# Patient Record
Sex: FEMALE | Race: BLACK OR AFRICAN AMERICAN | NOT HISPANIC OR LATINO | Employment: OTHER | URBAN - METROPOLITAN AREA
[De-identification: names, ages, dates, MRNs, and addresses within clinical notes are randomized per-mention and may not be internally consistent; named-entity substitution may affect disease eponyms.]

---

## 2017-01-09 ENCOUNTER — ALLSCRIPTS OFFICE VISIT (OUTPATIENT)
Dept: OTHER | Facility: OTHER | Age: 52
End: 2017-01-09

## 2017-01-10 ENCOUNTER — ALLSCRIPTS OFFICE VISIT (OUTPATIENT)
Dept: OTHER | Facility: OTHER | Age: 52
End: 2017-01-10

## 2017-01-10 ENCOUNTER — GENERIC CONVERSION - ENCOUNTER (OUTPATIENT)
Dept: OTHER | Facility: OTHER | Age: 52
End: 2017-01-10

## 2017-01-18 ENCOUNTER — ALLSCRIPTS OFFICE VISIT (OUTPATIENT)
Dept: RADIOLOGY | Facility: CLINIC | Age: 52
End: 2017-01-18
Payer: COMMERCIAL

## 2017-01-19 ENCOUNTER — GENERIC CONVERSION - ENCOUNTER (OUTPATIENT)
Dept: OTHER | Facility: OTHER | Age: 52
End: 2017-01-19

## 2017-02-02 ENCOUNTER — GENERIC CONVERSION - ENCOUNTER (OUTPATIENT)
Dept: OTHER | Facility: OTHER | Age: 52
End: 2017-02-02

## 2017-02-08 ENCOUNTER — GENERIC CONVERSION - ENCOUNTER (OUTPATIENT)
Dept: OTHER | Facility: OTHER | Age: 52
End: 2017-02-08

## 2017-02-23 ENCOUNTER — ALLSCRIPTS OFFICE VISIT (OUTPATIENT)
Dept: OTHER | Facility: OTHER | Age: 52
End: 2017-02-23

## 2017-02-28 ENCOUNTER — ALLSCRIPTS OFFICE VISIT (OUTPATIENT)
Dept: OTHER | Facility: OTHER | Age: 52
End: 2017-02-28

## 2017-02-28 DIAGNOSIS — M54.9 DORSALGIA: ICD-10-CM

## 2017-02-28 DIAGNOSIS — F11.20 UNCOMPLICATED OPIOID DEPENDENCE (HCC): ICD-10-CM

## 2017-02-28 DIAGNOSIS — Z79.899 OTHER LONG TERM (CURRENT) DRUG THERAPY: ICD-10-CM

## 2017-03-01 ENCOUNTER — ALLSCRIPTS OFFICE VISIT (OUTPATIENT)
Dept: RADIOLOGY | Facility: CLINIC | Age: 52
End: 2017-03-01
Payer: COMMERCIAL

## 2017-03-08 ENCOUNTER — ALLSCRIPTS OFFICE VISIT (OUTPATIENT)
Dept: OTHER | Facility: OTHER | Age: 52
End: 2017-03-08

## 2017-03-10 ENCOUNTER — GENERIC CONVERSION - ENCOUNTER (OUTPATIENT)
Dept: OTHER | Facility: OTHER | Age: 52
End: 2017-03-10

## 2017-03-13 ENCOUNTER — GENERIC CONVERSION - ENCOUNTER (OUTPATIENT)
Dept: OTHER | Facility: OTHER | Age: 52
End: 2017-03-13

## 2017-03-16 ENCOUNTER — GENERIC CONVERSION - ENCOUNTER (OUTPATIENT)
Dept: OTHER | Facility: OTHER | Age: 52
End: 2017-03-16

## 2017-03-22 ENCOUNTER — ALLSCRIPTS OFFICE VISIT (OUTPATIENT)
Dept: OTHER | Facility: OTHER | Age: 52
End: 2017-03-22

## 2017-03-29 ENCOUNTER — GENERIC CONVERSION - ENCOUNTER (OUTPATIENT)
Dept: OTHER | Facility: OTHER | Age: 52
End: 2017-03-29

## 2017-04-17 ENCOUNTER — GENERIC CONVERSION - ENCOUNTER (OUTPATIENT)
Dept: OTHER | Facility: OTHER | Age: 52
End: 2017-04-17

## 2017-05-10 ENCOUNTER — ALLSCRIPTS OFFICE VISIT (OUTPATIENT)
Dept: OTHER | Facility: OTHER | Age: 52
End: 2017-05-10

## 2017-05-10 DIAGNOSIS — E55.9 VITAMIN D DEFICIENCY: ICD-10-CM

## 2017-05-10 DIAGNOSIS — G89.4 CHRONIC PAIN SYNDROME: ICD-10-CM

## 2017-05-10 DIAGNOSIS — Z79.899 OTHER LONG TERM (CURRENT) DRUG THERAPY: ICD-10-CM

## 2017-05-10 DIAGNOSIS — F11.20 UNCOMPLICATED OPIOID DEPENDENCE (HCC): ICD-10-CM

## 2017-05-16 ENCOUNTER — ALLSCRIPTS OFFICE VISIT (OUTPATIENT)
Dept: OTHER | Facility: OTHER | Age: 52
End: 2017-05-16

## 2017-05-18 ENCOUNTER — GENERIC CONVERSION - ENCOUNTER (OUTPATIENT)
Dept: OTHER | Facility: OTHER | Age: 52
End: 2017-05-18

## 2017-05-18 ENCOUNTER — ALLSCRIPTS OFFICE VISIT (OUTPATIENT)
Dept: OTHER | Facility: OTHER | Age: 52
End: 2017-05-18

## 2017-05-19 ENCOUNTER — GENERIC CONVERSION - ENCOUNTER (OUTPATIENT)
Dept: OTHER | Facility: OTHER | Age: 52
End: 2017-05-19

## 2017-05-22 ENCOUNTER — GENERIC CONVERSION - ENCOUNTER (OUTPATIENT)
Dept: OTHER | Facility: OTHER | Age: 52
End: 2017-05-22

## 2017-05-25 ENCOUNTER — HOSPITAL ENCOUNTER (EMERGENCY)
Facility: HOSPITAL | Age: 52
Discharge: HOME | End: 2017-05-25
Attending: EMERGENCY MEDICINE | Admitting: EMERGENCY MEDICINE
Payer: COMMERCIAL

## 2017-05-25 VITALS
TEMPERATURE: 99.6 F | DIASTOLIC BLOOD PRESSURE: 84 MMHG | HEART RATE: 105 BPM | SYSTOLIC BLOOD PRESSURE: 148 MMHG | BODY MASS INDEX: 24.56 KG/M2 | OXYGEN SATURATION: 100 % | WEIGHT: 130 LBS | RESPIRATION RATE: 16 BRPM

## 2017-05-25 DIAGNOSIS — H72.92 PERFORATED LEFT TYMPANIC MEMBRANE ON EXAMINATION: Primary | ICD-10-CM

## 2017-05-25 LAB
BACTERIA UR QL AUTO: ABNORMAL /HPF
BILIRUB UR QL STRIP: NEGATIVE
CLARITY UR: CLEAR
COLOR UR: YELLOW
GLUCOSE UR STRIP-MCNC: NEGATIVE MG/DL
HGB UR QL STRIP.AUTO: ABNORMAL
KETONES UR STRIP-MCNC: NEGATIVE MG/DL
LEUKOCYTE ESTERASE UR QL STRIP: ABNORMAL
NITRITE UR QL STRIP: NEGATIVE
NON-SQ EPI CELLS URNS QL MICRO: ABNORMAL /HPF
PH UR STRIP.AUTO: 7.5 [PH] (ref 4.5–8)
PROT UR STRIP-MCNC: NEGATIVE MG/DL
RBC #/AREA URNS AUTO: ABNORMAL /HPF
SP GR UR STRIP.AUTO: 1.01 (ref 1–1.03)
UROBILINOGEN UR QL STRIP.AUTO: 0.2 E.U./DL
WBC #/AREA URNS AUTO: ABNORMAL /HPF

## 2017-05-25 PROCEDURE — 99283 EMERGENCY DEPT VISIT LOW MDM: CPT

## 2017-05-25 PROCEDURE — 81001 URINALYSIS AUTO W/SCOPE: CPT

## 2017-05-25 PROCEDURE — 96372 THER/PROPH/DIAG INJ SC/IM: CPT

## 2017-05-25 RX ORDER — KETOROLAC TROMETHAMINE 30 MG/ML
15 INJECTION, SOLUTION INTRAMUSCULAR; INTRAVENOUS ONCE
Status: COMPLETED | OUTPATIENT
Start: 2017-05-25 | End: 2017-05-25

## 2017-05-25 RX ORDER — AMOXICILLIN AND CLAVULANATE POTASSIUM 875; 125 MG/1; MG/1
1 TABLET, FILM COATED ORAL 2 TIMES DAILY
Qty: 14 TABLET | Refills: 0 | Status: SHIPPED | OUTPATIENT
Start: 2017-05-25 | End: 2017-06-04

## 2017-05-25 RX ORDER — OFLOXACIN 3 MG/ML
5 SOLUTION AURICULAR (OTIC) 2 TIMES DAILY
Qty: 5 ML | Refills: 0 | Status: SHIPPED | OUTPATIENT
Start: 2017-05-25 | End: 2017-06-01

## 2017-05-25 RX ADMIN — KETOROLAC TROMETHAMINE 15 MG: 30 INJECTION, SOLUTION INTRAMUSCULAR at 06:25

## 2017-06-07 ENCOUNTER — GENERIC CONVERSION - ENCOUNTER (OUTPATIENT)
Dept: OTHER | Facility: OTHER | Age: 52
End: 2017-06-07

## 2017-06-07 ENCOUNTER — ALLSCRIPTS OFFICE VISIT (OUTPATIENT)
Dept: OTHER | Facility: OTHER | Age: 52
End: 2017-06-07

## 2017-06-09 ENCOUNTER — GENERIC CONVERSION - ENCOUNTER (OUTPATIENT)
Dept: OTHER | Facility: OTHER | Age: 52
End: 2017-06-09

## 2017-06-12 ENCOUNTER — HOSPITAL ENCOUNTER (EMERGENCY)
Facility: HOSPITAL | Age: 52
Discharge: HOME | End: 2017-06-12
Attending: EMERGENCY MEDICINE
Payer: COMMERCIAL

## 2017-06-12 VITALS
SYSTOLIC BLOOD PRESSURE: 195 MMHG | BODY MASS INDEX: 26.26 KG/M2 | OXYGEN SATURATION: 99 % | WEIGHT: 139 LBS | DIASTOLIC BLOOD PRESSURE: 117 MMHG | RESPIRATION RATE: 20 BRPM | TEMPERATURE: 98.8 F | HEART RATE: 85 BPM

## 2017-06-12 DIAGNOSIS — M25.551 CHRONIC PAIN OF RIGHT HIP: Primary | ICD-10-CM

## 2017-06-12 DIAGNOSIS — F31.9 BIPOLAR DISORDER (HCC): ICD-10-CM

## 2017-06-12 DIAGNOSIS — G89.29 CHRONIC PAIN OF RIGHT HIP: Primary | ICD-10-CM

## 2017-06-12 PROCEDURE — 99283 EMERGENCY DEPT VISIT LOW MDM: CPT

## 2017-06-12 PROCEDURE — 96372 THER/PROPH/DIAG INJ SC/IM: CPT

## 2017-06-12 RX ORDER — KETOROLAC TROMETHAMINE 30 MG/ML
15 INJECTION, SOLUTION INTRAMUSCULAR; INTRAVENOUS ONCE
Status: COMPLETED | OUTPATIENT
Start: 2017-06-12 | End: 2017-06-12

## 2017-06-12 RX ADMIN — KETOROLAC TROMETHAMINE 15 MG: 30 INJECTION, SOLUTION INTRAMUSCULAR at 20:29

## 2017-06-14 ENCOUNTER — GENERIC CONVERSION - ENCOUNTER (OUTPATIENT)
Dept: OTHER | Facility: OTHER | Age: 52
End: 2017-06-14

## 2017-06-16 ENCOUNTER — HOSPITAL ENCOUNTER (OUTPATIENT)
Dept: RADIOLOGY | Facility: HOSPITAL | Age: 52
Discharge: HOME | End: 2017-06-16
Attending: ORTHOPAEDIC SURGERY
Payer: COMMERCIAL

## 2017-06-16 ENCOUNTER — ALLSCRIPTS OFFICE VISIT (OUTPATIENT)
Dept: OTHER | Facility: OTHER | Age: 52
End: 2017-06-16

## 2017-06-16 DIAGNOSIS — M17.10 PRIMARY OSTEOARTHRITIS OF ONE KNEE: ICD-10-CM

## 2017-06-16 PROCEDURE — 73562 X-RAY EXAM OF KNEE 3: CPT

## 2017-06-19 ENCOUNTER — ALLSCRIPTS OFFICE VISIT (OUTPATIENT)
Dept: OTHER | Facility: OTHER | Age: 52
End: 2017-06-19

## 2017-07-03 ENCOUNTER — TRANSCRIBE ORDERS (OUTPATIENT)
Dept: ADMINISTRATIVE | Facility: HOSPITAL | Age: 52
End: 2017-07-03

## 2017-07-03 ENCOUNTER — GENERIC CONVERSION - ENCOUNTER (OUTPATIENT)
Dept: OTHER | Facility: OTHER | Age: 52
End: 2017-07-03

## 2017-07-03 DIAGNOSIS — M16.0 PRIMARY OSTEOARTHRITIS OF BOTH HIPS: Primary | ICD-10-CM

## 2017-07-11 ENCOUNTER — HOSPITAL ENCOUNTER (OUTPATIENT)
Dept: MRI IMAGING | Facility: HOSPITAL | Age: 52
Discharge: HOME | End: 2017-07-11
Attending: ANESTHESIOLOGY
Payer: COMMERCIAL

## 2017-07-11 ENCOUNTER — GENERIC CONVERSION - ENCOUNTER (OUTPATIENT)
Dept: OTHER | Facility: OTHER | Age: 52
End: 2017-07-11

## 2017-07-11 DIAGNOSIS — M16.0 PRIMARY OSTEOARTHRITIS OF BOTH HIPS: ICD-10-CM

## 2017-07-11 PROCEDURE — 73721 MRI JNT OF LWR EXTRE W/O DYE: CPT

## 2017-07-12 ENCOUNTER — GENERIC CONVERSION - ENCOUNTER (OUTPATIENT)
Dept: OTHER | Facility: OTHER | Age: 52
End: 2017-07-12

## 2017-07-17 ENCOUNTER — GENERIC CONVERSION - ENCOUNTER (OUTPATIENT)
Dept: OTHER | Facility: OTHER | Age: 52
End: 2017-07-17

## 2017-07-18 ENCOUNTER — APPOINTMENT (OUTPATIENT)
Dept: LAB | Facility: HOSPITAL | Age: 52
End: 2017-07-18
Payer: COMMERCIAL

## 2017-07-18 DIAGNOSIS — E55.9 VITAMIN D DEFICIENCY: ICD-10-CM

## 2017-07-18 LAB
25(OH)D3 SERPL-MCNC: 30.5 NG/ML (ref 30–100)
ALBUMIN SERPL BCP-MCNC: 3.6 G/DL (ref 3.5–5)
ALP SERPL-CCNC: 86 U/L (ref 46–116)
ALT SERPL W P-5'-P-CCNC: 19 U/L (ref 12–78)
ANION GAP SERPL CALCULATED.3IONS-SCNC: 7 MMOL/L (ref 4–13)
AST SERPL W P-5'-P-CCNC: 13 U/L (ref 5–45)
BILIRUB SERPL-MCNC: 0.57 MG/DL (ref 0.2–1)
BUN SERPL-MCNC: 6 MG/DL (ref 5–25)
CALCIUM SERPL-MCNC: 8.5 MG/DL (ref 8.3–10.1)
CHLORIDE SERPL-SCNC: 101 MMOL/L (ref 100–108)
CO2 SERPL-SCNC: 31 MMOL/L (ref 21–32)
CREAT SERPL-MCNC: 0.88 MG/DL (ref 0.6–1.3)
GFR SERPL CREATININE-BSD FRML MDRD: >60 ML/MIN/1.73SQ M
GLUCOSE SERPL-MCNC: 94 MG/DL (ref 65–140)
POTASSIUM SERPL-SCNC: 3.2 MMOL/L (ref 3.5–5.3)
PROT SERPL-MCNC: 7 G/DL (ref 6.4–8.2)
SODIUM SERPL-SCNC: 139 MMOL/L (ref 136–145)

## 2017-07-18 PROCEDURE — 82306 VITAMIN D 25 HYDROXY: CPT

## 2017-07-18 PROCEDURE — 36415 COLL VENOUS BLD VENIPUNCTURE: CPT

## 2017-07-18 PROCEDURE — 80053 COMPREHEN METABOLIC PANEL: CPT

## 2017-08-03 ENCOUNTER — ALLSCRIPTS OFFICE VISIT (OUTPATIENT)
Dept: OTHER | Facility: OTHER | Age: 52
End: 2017-08-03

## 2017-08-09 ENCOUNTER — GENERIC CONVERSION - ENCOUNTER (OUTPATIENT)
Dept: OTHER | Facility: OTHER | Age: 52
End: 2017-08-09

## 2017-08-09 ENCOUNTER — ALLSCRIPTS OFFICE VISIT (OUTPATIENT)
Dept: OTHER | Facility: OTHER | Age: 52
End: 2017-08-09

## 2017-09-19 ENCOUNTER — GENERIC CONVERSION - ENCOUNTER (OUTPATIENT)
Dept: OTHER | Facility: OTHER | Age: 52
End: 2017-09-19

## 2017-10-03 ENCOUNTER — GENERIC CONVERSION - ENCOUNTER (OUTPATIENT)
Dept: OTHER | Facility: OTHER | Age: 52
End: 2017-10-03

## 2017-10-06 ENCOUNTER — GENERIC CONVERSION - ENCOUNTER (OUTPATIENT)
Dept: OTHER | Facility: OTHER | Age: 52
End: 2017-10-06

## 2017-10-13 ENCOUNTER — GENERIC CONVERSION - ENCOUNTER (OUTPATIENT)
Dept: OTHER | Facility: OTHER | Age: 52
End: 2017-10-13

## 2017-10-13 DIAGNOSIS — Z79.899 OTHER LONG TERM (CURRENT) DRUG THERAPY: ICD-10-CM

## 2017-10-13 DIAGNOSIS — G89.4 CHRONIC PAIN SYNDROME: ICD-10-CM

## 2017-10-13 DIAGNOSIS — F11.20 UNCOMPLICATED OPIOID DEPENDENCE (HCC): ICD-10-CM

## 2017-10-17 ENCOUNTER — GENERIC CONVERSION - ENCOUNTER (OUTPATIENT)
Dept: OTHER | Facility: OTHER | Age: 52
End: 2017-10-17

## 2017-11-07 ENCOUNTER — ALLSCRIPTS OFFICE VISIT (OUTPATIENT)
Dept: OTHER | Facility: OTHER | Age: 52
End: 2017-11-07

## 2017-12-08 ENCOUNTER — ALLSCRIPTS OFFICE VISIT (OUTPATIENT)
Dept: OTHER | Facility: OTHER | Age: 52
End: 2017-12-08

## 2017-12-08 DIAGNOSIS — M54.16 RADICULOPATHY OF LUMBAR REGION: ICD-10-CM

## 2017-12-09 NOTE — PROGRESS NOTES
Assessment    1  Lumbar radiculopathy (724 4) (M54 16)   2  Lumbar spondylosis (721 3) (M47 816)   3  Fibromyalgia (729 1) (M79 7)   4  Chronic pain syndrome (338 4) (G89 4)    Plan  Chronic bilateral low back pain with bilateral sciatica    · Gabapentin 300 MG Oral Capsule; Take one capsule at bedtime with one 600mg tablet to equal a total of 900mg at bedtime for leg pain   Rx By: Pérez Spain; Dispense: 30 Days ; #:30 X 90 Capsule Bottle; Refill: 2;Chronic bilateral low back pain with bilateral sciatica; MARGARITO = N; Verified Transmission to [facility] #[id number]; Last Updated By: System, SureScripts; 12/8/2017 2:28:34 PM  Chronic pain syndrome    · Morphine Sulfate ER 15 MG Oral Tablet Extended Release; take 1 po in am; DoNot Fill Before: [id number]   Rx By: Pérez Spain; Dispense: 30 Days ; #:30 Tablet Extended Release; Refill: 0;For: Chronic pain syndrome; MARGARITO = N; Print Rx  Chronic pain syndrome, Fibromyalgia    · TiZANidine HCl - 2 MG Oral Tablet; Take 1 po tid prn spasms   Rx By: Pérez Spain; Dispense: 30 Days ; #:90 Tablet; Refill: 2;Chronic pain syndrome, Fibromyalgia; MARGARITO = N; Verified Transmission to [facility] #[id number]; Last Updated By: System, SureScripts; 12/8/2017 2:28:40 PM  Lumbar radiculopathy    · * MRI LUMBAR SPINE WO CONTRAST; Status:Need Information - FinancialAuthorization; Requested for:[id number];    Perform:[facility]; [phone]; Ordered; For:Lumbar radiculopathy; Ordered By:Vikash Kendall;  Lumbar spondylosis    · Gabapentin 600 MG Oral Tablet; Take 1 po hs   Rx By: Pérez Spain; Dispense: 30 Days ; #:30 Tablet; Refill: 2;Lumbar spondylosis; MARGARITO = N; Verified Transmission to [facility] #[id number];  Last Updated By: System, SureScripts; 12/8/2017 2:28:35 PM  Osteoarthritis of both hips, unspecified osteoarthritis type    · Acetaminophen-Codeine #3 300-30 MG Oral Tablet; TAKE 1 TABLET TWICEDAILY AS NEEDED FOR PAIN; Do Not Fill Before: [id number]   Rx By: Pérez Spain; Dispense: 30 Days ; #:60 Tablet; Refill: 1;Osteoarthritis of both hips, unspecified osteoarthritis type; MARGARITO = N; Print Rx    49-year-old female with a history of fibromyalgia returning for follow-up of lumbosacral back pain with radiculitis in the L4-5 distributions of bilateral lower extremities  The patient does have positive straight leg raise bilaterally  Her low back pain seems to be multifactorial with contributing factors including myofascial pain, a facet mediated component, and possible neuropathic component  The patient manages her pain with morphine ER 15 mg daily, Tylenol No  3 b i d  p r n  for breakthrough pain, gabapentin 900 mg q h s , and tizanidine 2 mg q 8 hours p r n  Sherre Soulier She was taking meloxicam, however did not find this very effective  She had also previously tried numerous NSAIDs  The seem to be ineffective  Also of note, the patient does take lorazepam for anxiety and I did talk with her once again about speaking with her psychiatrist to find a non benzodiazepine anxiolytic  1  I will order an MRI of the patient's lumbar spine 2  we will continue morphine ER 15 mg daily  The patient states that this medication is effective at helping to manage her pain and complete her activities of daily living  She is not exhibiting any Aspirin behavior at this time  prescription drug monitoring program report was reviewed and appropriate for what is prescribed  The risks and side effects of chronic opioid treatment were discussed in detail with the patient  Side effects include, but are not limited to: nausea, vomiting, GI intolerance, sedation, constipation, mental clouding, opioid induced hyperalgesia, endocrine dysfunction, addiction, dependence, and tolerance  The patient was asked to take their medications only as prescribed and directed, never in excess, and never for any other reason other than for pain control   The patient was also asked to keep his medications out of the reach of others and away from children, preferably in a locked drawer  The patient verbalized understanding and wished to use these opioid medications  the patient was given prescriptions with a do not fill day before December 14, 2017 and 1 for January 13 2018    3  We will continue Tylenol No  3 b i d  p r n  for breakthrough pain 4  Will continue tizanidine 2 mg q 8 hours p r n  for myofascial pain 5  Will continue gabapentin 900 mg q h s  and we will hold off on increasing the secondary to daytime drowsiness with daytime dosing 6  I will follow up the patient in 2 months 7  Patient will continue with her home exercise program   Discussion/Summary  The patient has the current Goals: Reduced pain and improved function  The patent has the current Barriers: Fibromyalgia  Patient is able to Self-Care  Possible side effects of new medications were reviewed with the patient/guardian today  The treatment plan was reviewed with the patient/guardian  The patient/guardian understands and agrees with the treatment plan   The patient was counseled regarding diagnostic results,-- instructions for management,-- risk factor reductions,-- prognosis,-- patient and family education,-- impressions,-- risks and benefits of treatment options-- and-- importance of compliance with treatment  total time of encounter was 15 minutes  Self Referrals: No      Chief Complaint    1  Back Pain  Low back and leg pain      History of Present Illness  68-year-old female with a history of fibromyalgia returning for follow-up of lumbosacral back pain that radiates into bilateral lower extremities in the L4-5 distribution down to mid shin  She denies any numbness, paresthesias, or subjective weakness  She denies any bladder or bowel incontinence or saddle anesthesia  X-ray of her lumbar spine reveals degenerative disc disease, scoliosis, and spondylosis  She has had lumbar facet joint injections without much relief   She has also had SI joint injections and a right hip injection which did not provide any significant her sustainable relief  She is currently utilizing morphine ER 15 mg daily and Tylenol No  3 b i d  p r n  for breakthrough pain  She does take gabapentin 900 mg q h s  and tizanidine 2 mg q 8 hours p r n  with about 30% of pain relief  she denies any side effects from the medications other than some drowsiness with daytime doses of gabapentin, which is what she takes the medication at bedtime only  patient rates her pain a 9/10 on the pain is worse in the morning in the evening  The pain is constant and described as burning, throbbing, cramping, and shooting  The pain is worse with standing and walking and lifting  The pain is alleviated with relaxation and lying down  I have personally reviewed and/or updated the patient's past medical history, past surgical history, family history, social history, allergies, and vital signs today  than as stated above, the patient denies any interval changes in medications, medical condition, mental condition, symptoms, or allergies since the last office visit  Fausto Washington presents with complaints of constant episodes of bilateral lower back pain, described as burning and throbbing, radiating to the bilateral thigh  On a scale of 1 to 10, the patient rates the pain as 9  Symptoms are worsening  Review of Systems   Constitutional: no fever,-- no recent weight gain-- and-- no recent weight loss  Eyes: no double vision-- and-- no blurry vision  Cardiovascular: no chest pain,-- no palpitations-- and-- no lower extremity edema  Respiratory: no complaints of shortness of breath-- and-- no wheezing  Musculoskeletal: difficulty walking,-- muscle weakness-- and-- joint stiffness, but-- no joint swelling,-- no limb swelling,-- no pain in extremity-- and-- no decreased range of motion  Neurological: memory loss, but-- no dizziness,-- no difficulty swallowing,-- no loss of consciousness-- and-- no seizures    Gastrointestinal: constipation, but-- no nausea,-- no vomiting-- and-- no diarrhea  Genitourinary: no difficulty initiating urine stream,-- no genital pain-- and-- no frequent urination  Integumentary: no complaints of skin rash  Psychiatric: no depression  Endocrine: no excessive thirst,-- no adrenal disease,-- no hypothyroidism-- and-- no hyperthyroidism  Hematologic/Lymphatic: no tendency for easy bruising-- and-- no tendency for easy bleeding  ROS reviewed  Active Problems    1  Analgesic use (V58 69) (Z79 899)   2  Anxiety (300 00) (F41 9)   3  Arthralgia Of Shoulder Region (719 41)   4  Back pain (724 5) (M54 9)   5  Bereavement (V62 82) (Z63 4)   6  Bilateral hip pain (719 45) (M25 551,M25 552)   7  Bipolar II disorder (296 89) (F31 81)   8  Chronic bilateral low back pain with bilateral sciatica (724 2,724 3,338 29) (M54 42,M54 41,G89 29)   9  Chronic pain of both knees (471 44,221 98) (M25 561,M25 562,G89 29)   10  Chronic pain syndrome (338 4) (G89 4)   11  Cognitive disorder (294 9) (F09)   12  Esophageal reflux (530 81) (K21 9)   13  Fatigue (780 79) (R53 83)   14  Female pelvic pain (625 9) (R10 2)   15  Fibromyalgia (729 1) (M79 7)   16  Generalized anxiety disorder (300 02) (F41 1)   17  Hypertension (401 9) (I10)   18  Hypokalemia (276 8) (E87 6)   19  Insomnia (780 52) (G47 00)   20  Lumbar spondylosis (721 3) (M47 816)   21  Major depressive disorder, recurrent episode, moderate degree (296 32) (F33 1)   22  Migraine (346 90) (G43 909)   23  History of Need for prophylactic vaccination and inoculation against influenza (V04 81)  (Z23)   24  Opioid dependence (304 00) (F11 20)   25  Osteoarthritis of both hips, unspecified osteoarthritis type (715 95) (M16 0)   26  Osteoarthritis of knee (715 36) (M17 10)   27  Overactive bladder (596 51) (N32 81)   28  Pain, joint, hip (719 45) (M25 559)   29  Panic disorder without agoraphobia (300 01) (F41 0)   30  Post traumatic stress disorder (309 81) (F43 10)   31   Primary localized osteoarthritis of both knees (715 16) (M17 0)   32  Recent unexplained weight loss (783 21) (R63 4)   33  Right knee pain (719 46) (M25 561)   34  Sacroiliitis (720 2) (M46 1)   35  Tremor (781 0) (R25 1)   36  Urinary incontinence (788 30) (R32)   37  Vitamin D deficiency (268 9) (E55 9)    Past Medical History    1  History of Acute nonsuppurative otitis media, unspecified laterality (381 00) (H65 199)   2  History of Bipolar disorder (296 80) (F31 9)   3  History of Encounter for screening colonoscopy (V76 51) (Z12 11)   4  Fibromyalgia (729 1) (M79 7)   5  History of H/O colonoscopy (V45 89) (Z98 890)   6  History of nausea and vomiting (V12 79) (Z87 898)   7  History of stroke (V12 54) (Z86 73)   8  History of urinary tract infection (V13 02) (Z87 440)   9  History of Left knee pain (719 46) (M25 562)   10  History of Memory loss (780 93) (R41 3)   11  History of Mixed Anxiety Disorder (300 00)   12  History of Need for hepatitis C screening test (V73 89) (Z11 59)   13  History of Need for prophylactic vaccination and inoculation against influenza (V04 81)  (Z23)   14  History of Need for Tdap vaccination (V06 1) (Z23)   15  Personal history of arthritis (V13 4) (Z87 39)   16  History of Previous Spontaneous Vaginal Delivery   17  History of Screening for lipoid disorders (V77 91) (Z13 220)   18  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)   19  History of Visit for screening mammogram (V76 12) (Z12 31)    Surgical History  1  History of  Section   2  History of Ear Surgery   3  History of Hysterectomy   4  History of Tubal Ligation    Family History  Mother    1  Family history of Colon Cancer (V16 0)  Father    2  Family history of Alzheimer's disease (V17 2) (Z82 0)   3  Family history of cerebrovascular accident (V17 1) (Z82 3)  Son    4  Family history of seizures (V19 8) (Z84 89)  Brother    5  Family history of Colon cancer   6   Family history of bipolar disorder (V17 0) (Z81 8)  Maternal Aunt    7  Family history of Breast Cancer (V16 3)  Unknown    8  Family history of cardiac disorder (V17 49) (Z82 49)   9  Family history of diabetes mellitus (V18 0) (Z83 3)   10  Family history of hypertension (V17 49) (Z82 49)    Social History     · Being A Social Drinker   · Bereavement (V62 82) (Z63 4)   · Daily caffeine consumption, 6-8 servings a day   ·    · Education Level: Graduate school   · Lives in [facility]   · Never A Smoker   · No drug use    Current Meds   1  Acetaminophen-Codeine #3 300-30 MG Oral Tablet; TAKE 1 TABLET TWICE DAILY AS NEEDED FOR PAIN; Therapy: 74QOA7607 to (Evaluate:44Swq4789); Last Rx:13Oct2017 Ordered   2  AmLODIPine Besylate 10 MG Oral Tablet; TAKE 1 TABLET DAILY; Therapy: 93OFZ2866 to (Evaluate:13May2018)  Requested for: 58IKI8247; Last Rx:18May2017 Ordered   3  Aspirin 81 MG Oral Tablet Delayed Release; take 1 tablet by mouth daily; Therapy: 13DUK3423 to (Evaluate:73Qwq0832)  Requested for: 01TQM4255; Last Rx:24Feb2017 Ordered   4  Gabapentin 300 MG Oral Capsule; Take one capsule at bedtime with one 600 mg tablet to equal a total of 900mg at bedtime for leg pain; Therapy: 92AVV9609 to (GEOXWPZM:43JVR2259)  Requested for: 27DAK5516; Last Rx:13Oct2017 Ordered   5  Gabapentin 600 MG Oral Tablet; Take 1 po hs; Therapy: 57TCB6524 to (Evaluate:11Jan2018)  Requested for: 13Oct2017; Last Rx:13Oct2017 Ordered   6  LORazepam 2 MG Oral Tablet; TAKE 1 TABLET THREE TIMES A DAY AS NEEDED *DO NOT FILL UNTIL 8/21 PERMD FOR TRAVEL*; Therapy: 97JNO6653 to (Evaluate:87Iyi0423)  Requested for: 77VAJ5450; Last Rx:07Nov2017 Ordered   7  Meloxicam 7 5 MG Oral Tablet; Take 1 tablet twice daily as needed; Therapy: 62TEO3740 to (Last Rx:06Oct2017)  Requested for: 42QRU7725 Ordered   8  Morphine Sulfate ER 15 MG Oral Tablet Extended Release; take 1 po in am; Therapy: 62KKE7517 to (Evaluate:12Nov2017); Last Rx:13Oct2017 Ordered   9   Omeprazole 20 MG Oral Capsule Delayed Release; TAKE 1 CAPSULE DAILY EVERY MORNING BEFORE BREAKFAST; Therapy: 44SXP5834 to (Evaluate:2018)  Requested for: 44STU3349; Last Rx:2017 Ordered   10  Primidone 250 MG Oral Tablet; TAKE 1 TABLET AT BEDTIME; Therapy: 86PTT8802 to (Sherri Ocampo)  Requested for: 36Tbs4906; Last  Rx:27Xiu3068 Ordered   11  Propranolol HCl - 20 MG Oral Tablet; Take 1 tablet twice daily; Therapy: 54PTS7411 to (Evaluate:2018)  Requested for: 15NLC8439; Last  Rx:2017 Ordered   12  RisperiDONE 2 MG Oral Tablet; TAKE 1 TABLET Bedtime; Therapy: 22NAJ5608 to ([facility])  Requested for: 24IMO4162; Last  Rx:2017 Ordered   13  TiZANidine HCl - 2 MG Oral Tablet; Take 1 po tid prn spasms; Therapy: 98YTH3206 to ([id number])  Requested for: 53VLV3423; Last  Rx:2017 Ordered   14  TraZODone HCl - 150 MG Oral Tablet; TAKE 1 TABLET Once At Bedtime; Therapy: 15GJU7557 to ([facility])  Requested for: 27XXT1973; Last  Rx:2017 Ordered   15  Venlafaxine HCl - 25 MG Oral Tablet; TAKE 1 TABLET 3 TIMES DAILY WITH FOOD; Therapy: 41AJF3673 to ([facility])  Requested for: 00KZL8535; Last  Rx:2017 Ordered   16  VESIcare 10 MG Oral Tablet; Take 1 tablet daily; Therapy: 93CQG0377 to ([facility])  Requested for: 99MGP7811; Last  Rx:2017 Ordered   17  Vitamin D3 1000 UNIT Oral Capsule; TAKE 1 CAPSULE DAILY; Therapy: 19DXR2012 to (Evaluate:2018)  Requested for: 72MNS7709; Last  Rx:2017 Ordered    Allergies  1  No Known Drug Allergies    Vitals  Vital Signs    Recorded: 48QSH2954 01:56PM   Temperature 98 1 F   Heart Rate 61   Systolic 603   Diastolic 89   Height 5 ft 1 in   Weight 136 lb    BMI Calculated 25 7   BSA Calculated 1 6   Pain Scale 9       Physical Exam   Constitutional  General appearance: Well developed, well nourished, alert, in no distress, non-toxic and no overt pain behavior  Eyes  Sclera: anicteric  HEENT  Hearing grossly intact     Neck Neck: Supple, symmetric, trachea midline, no masses  Pulmonary  Respiratory effort: Even and unlabored  Abdomen  Abdomen: Soft, non-tender, non-distended  Skin  Skin and subcutaneous tissue: Normal without rashes or lesions, well hydrated  Psychiatric  Mood and affect: Mood and affect appropriate  Neurologic  the muscle tone was normal  Musculoskeletal  Gait and station: Normal    Lumbar/Sacral Spine examination demonstrates  Bilateral lumbar paraspinals mildly tender to palpation with muscle spasms noted bilaterally  Bilateral lower extremity strength 5/5 in all muscle groups  Positive seated straight leg raise bilaterally  Results/Data  Results Free Text Form Pain [facility]:   Results    I personally reviewed the films/images in the office today        Future Appointments    Date/Time Provider Specialty Site   12/22/2017 11:20 AM Sonia Means DO Orthopedic Surgery  [facility]   01/19/2018 04:15 PM Baljeet Diamond MS, APRN, PMHCNS_BS  [facility]   02/02/2018 02:15 PM Collin Calzada DO Pain Management [facility]       Signatures   Electronically signed by : Jena Michel DO; Dec  8 2017  5:11PM EST                       (Author)

## 2017-12-11 ENCOUNTER — TRANSCRIBE ORDERS (OUTPATIENT)
Dept: ADMINISTRATIVE | Facility: HOSPITAL | Age: 52
End: 2017-12-11

## 2017-12-11 DIAGNOSIS — M54.16 LUMBAR RADICULOPATHY: Primary | ICD-10-CM

## 2017-12-12 ENCOUNTER — GENERIC CONVERSION - ENCOUNTER (OUTPATIENT)
Dept: OTHER | Facility: OTHER | Age: 52
End: 2017-12-12

## 2017-12-13 ENCOUNTER — GENERIC CONVERSION - ENCOUNTER (OUTPATIENT)
Dept: OTHER | Facility: OTHER | Age: 52
End: 2017-12-13

## 2017-12-19 ENCOUNTER — HOSPITAL ENCOUNTER (OUTPATIENT)
Dept: MRI IMAGING | Facility: HOSPITAL | Age: 52
Discharge: HOME | End: 2017-12-19
Attending: ANESTHESIOLOGY
Payer: COMMERCIAL

## 2017-12-19 DIAGNOSIS — M54.16 RADICULOPATHY OF LUMBAR REGION: ICD-10-CM

## 2017-12-19 PROCEDURE — 72148 MRI LUMBAR SPINE W/O DYE: CPT

## 2017-12-20 ENCOUNTER — GENERIC CONVERSION - ENCOUNTER (OUTPATIENT)
Dept: OTHER | Facility: OTHER | Age: 52
End: 2017-12-20

## 2018-01-10 NOTE — MISCELLANEOUS
Message   Recorded as Task   Date: 04/17/2017 10:46 AM, Created By: [facility]   Task Name: Medical Complaint Callback   Assigned To: [facility]   Regarding Patient: Ranjeet Schmidt, Status: Active   CommentIain Bazzi - 17 Apr 2017 10:46 AM     TASK CREATED  Caller: Self; Medical Complaint; [phone] (Home)  took call from phone room, S/W pt  Pt states she is in [place] and is running out of Tizanidine 2 mg, take 1 tablet 3x/day  Pt stated she can't get it filled in [place] b/c her insurance won't pay for it  Pt stated she has [address] with here in [facility] and she is wondering if she can take the Bacolfen 10 mg for now in place of the Tizanidine to she gets home? Pt stated she "will be short 7 pills while she is in [place] of the Tizanidine "  Pt states she will be home in 1 week  Pt states she is "in alot of pain, 9/10 and 10/10 when trying to walk  I can barely walk, using a wheelchair to get from point A to point B "  Pain is in back, b/l hips, b/l groins- left groin is worse  Pt is wondering about the "patch that stays on for a week that was talked about  I need to be seen to get the patch and b/c of the pain in my legs, hips and back when I get back home "    **Pt stated okay to leave message on machine if she doens't answer "   Jenaro Fonseca - 17 Apr 2017 10:51 AM     TASK REPLIED TO: Previously Assigned To [facility] [place] [facility]  She can take Baclofen in place of the Tizanidine and as far as the Butrans patch, there is nothing we can do until she is back in [place] and is seen in the office as we discussed at her last OV  Thank you  Carolina Reno - 17 Apr 2017 11:33 AM     TASK EDITED   [facility] - 17 Apr 2017 12:20 PM     TASK EDITED  s/w pt  Advised pt of the same  Pt verbalized understanding  Pt stated "I'm in alot of pain  I had a death in the family that is why I went to [facility]    I want to come in before 5/17/17 to get the patch "  Looked at [facility] - DEWAYNE BUSTILLO  and MICAH's schedules and did not find one sooner  "Pt stated I don't know what to do  I'm having a hard time  I can't even go up the stairs, using a wheelchair "  Recommended pt to go to the ER if in excruciating pain or if pain is above 10/10  Pt will call us when she gets home to see if any appts opened up and Ashwini Dennis will put pt on cancellation list if an appt opens up sooner  Please advise  Jenaro Fonseca - 17 Apr 2017 12:27 PM     TASK REPLIED TO: Previously Assigned To Jenaro Fonseca  That is the best we can do  She should take the medications as prescribed and we are sorry for her loss  Thank you  Estelle Russell - 17 Apr 2017 1:29 PM     TASK EDITED  S/w pt  Advised pt of the same  Pt verbalized understanding  Pt stated "I don't want to go to the ER down here  They only cover me in [place]" -referring to insurance  I don't know what to do  I can't walk "  Recommended ice and/or heat  Pt stated she tried a hot shower and she stated she will try her best to get some ice  Jenaro Fonseca - 17 Apr 2017 2:44 PM     TASK REPLIED TO: Previously Assigned To Jenaro Fonseca  Provider aware  There is nothing we can do from here  She definately has some cognitive issues so, she should go to the ER or f/u when she gets home  There is just nothing else we can do  Kirill Starks - 17 Apr 2017 2:59 PM     TASK EDITED  s/w pt, Adcknoledged previous conversation  Pt states she is resting with ice which seems to be helping at this time  Pt states she will have to go to the ed if the pain doesn't get better / gets worse  Advised pt - there is not much this office can do at this point  Advised pt, go to the ed if necessary - call the customer service number on the back of ins card for info re: out of area ed visits before you go or as soon as you are discharged  Advised pt to fu with this office when she returns to the area  Pt verbalized understanding and appreciation  Active Problems    1  Analgesic use (V58 69) (Z79 899)   2   Anxiety (300 00) (F41 9)   3  Arthralgia Of Shoulder Region (719 41)   4  Back pain (724 5) (M54 9)   5  Bipolar II disorder (296 89) (F31 81)   6  Chronic bilateral low back pain with bilateral sciatica (724 2,724 3,338 29)   (M54 42,M54 41,G89 29)   7  Chronic pain of both knees (923 23,320 09) (M25 561,M25 562,G89 29)   8  Chronic pain syndrome (338 4) (G89 4)   9  Cognitive disorder (294 9) (F09)   10  Depression (311) (F32 9)   11  Esophageal reflux (530 81) (K21 9)   12  Fatigue (780 79) (R53 83)   13  Female pelvic pain (625 9) (R10 2)   14  Fibromyalgia (729 1) (M79 7)   15  Generalized anxiety disorder (300 02) (F41 1)   16  Hypertension (401 9) (I10)   17  Hypokalemia (276 8) (E87 6)   18  Insomnia (780 52) (G47 00)   19  Lumbar spondylosis (721 3) (M47 816)   20  Major depressive disorder, recurrent episode, moderate degree (296 32) (F33 1)   21  Migraine (346 90) (G43 909)   22  History of Need for prophylactic vaccination and inoculation against influenza (V04 81)    (Z23)   23  Opioid dependence (304 00) (F11 20)   24  Osteoarthritis of both hips, unspecified osteoarthritis type (715 95) (M16 0)   25  Osteoarthritis of knee (715 36) (M17 10)   26  Overactive bladder (596 51) (N32 81)   27  Pain, joint, hip (719 45) (M25 559)   28  Panic disorder without agoraphobia (300 01) (F41 0)   29  Post traumatic stress disorder (309 81) (F43 10)   30  Recent unexplained weight loss (783 21) (R63 4)   31  Right knee pain (719 46) (M25 561)   32  Sacroiliitis (720 2) (M46 1)   33  Tremor (781 0) (R25 1)   34  Urinary incontinence (788 30) (R32)   35  Vitamin D deficiency (268 9) (E55 9)    Current Meds   1  Acetaminophen-Codeine #3 300-30 MG Oral Tablet; TAKE 1 TABLET 3 TIMES DAILY AS   NEEDED FOR PAIN;   Therapy: 02KXJ8158 to (Evaluate:21May2017); Last Rx:22Mar2017 Ordered   2  AmLODIPine Besylate 10 MG Oral Tablet; TAKE 1 TABLET DAILY;    Therapy: 73VZU7915 to (Evaluate:01Jun2017)  Requested for: 85ABL4047; Last   CS:25UNN4147 Ordered   3  Aspirin 81 MG Oral Tablet Delayed Release; take 1 tablet by mouth daily; Therapy: 68RAT8205 to (Evaluate:19Feb2018)  Requested for: 62XUP1454; Last   Rx:24Feb2017 Ordered   4  Diclofenac Potassium 50 MG Oral Tablet; TAKE 1 TABLET TWICE DAILY AFTER MEALS; Therapy: 00ZVS0200 to (Murrel Proper)  Requested for: 72DOG2307; Last   Rx:09Jan2017 Ordered   5  Gabapentin 300 MG Oral Capsule; TAKE 1 CAPSULE Bedtime; Therapy: 82MTB7589 to (Evaluate:21May2017)  Requested for: 44RQO2599; Last   Rx:22Mar2017 Ordered   6  LORazepam 2 MG Oral Tablet; TAKE 1 TABLET 3 TIMES DAILY; Therapy: 50COA7313 to (Evaluate:11Jun2017); Last Rx:08Mar2017 Ordered   7  Omeprazole 20 MG Oral Capsule Delayed Release; TAKE 1 CAPSULE DAILY EVERY   MORNING BEFORE BREAKFAST; Therapy: 49SGM2656 to (Evaluate:84Gjm1191)  Requested for: 28Mar2017; Last   Rx:28Mar2017 Ordered   8  Primidone 250 MG Oral Tablet; TAKE 1 TABLET AT BEDTIME; Therapy: 80BID6854 to (Evaluate:21Dec2017)  Requested for: 13CEG9900; Last   Rx:24Feb2017 Ordered   9  Propranolol HCl - 20 MG Oral Tablet; Take 1 tablet twice daily; Therapy: 03IPV9320 to (Evaluate:01Jun2017)  Requested for: 98LQS3112; Last   ML:05VUW8050 Ordered   10  RisperiDONE 2 MG Oral Tablet (RisperDAL); TAKE 1 TABLET Bedtime; Therapy: 74EPE1398 to (Evaluate:11Jun2017)  Requested for: 10ICH7233; Last    Rx:08Mar2017 Ordered   11  TiZANidine HCl - 2 MG Oral Tablet; Take 1 po tid prn spasms; Therapy: 36AGO5639 to (Evaluate:21May2017)  Requested for: 72YAQ2035; Last    Rx:22Mar2017 Ordered   12  TraZODone HCl - 150 MG Oral Tablet; TAKE 1 TABLET Once At Bedtime; Therapy: 92SXL4835 to (Evaluate:16Jun2017)  Requested for: 12DKF1361; Last    Rx:08Mar2017 Ordered   13  Venlafaxine HCl - 25 MG Oral Tablet; TAKE 1 TABLET 3 TIMES DAILY WITH FOOD; Therapy: 41ISW0271 to (Evaluate:11Jun2017)  Requested for: 71RWA7005; Last    Rx:08Mar2017 Ordered   14  VESIcare 10 MG Oral Tablet;  Take 1 tablet daily; Therapy: 58VXR6602 to (Evaluate:22Jun2016); Last Rx:24Mar2016 Ordered   15  Vitamin D3 1000 UNIT Oral Capsule; TAKE 1 CAPSULE DAILY; Therapy: 59GQQ1243 to (Evaluate:26Ytp5637)  Requested for: 45CIY6520; Last    Rx:56Kkq5190 Ordered    Allergies    1   No Known Drug Allergies    Signatures   Electronically signed by : Brando Flores, ; Apr 17 2017  2:59PM EST                       (Author)

## 2018-01-10 NOTE — MISCELLANEOUS
Message   Recorded as Task   Date: 02/08/2017 10:45 AM, Created By: Devan Munson   Task Name: Med Renewal Request   Assigned To: [facility]   Regarding Patient: Maryse Collins, Status: Active   Comment:    Blank Scanlon - 08 Feb 2017 10:45 AM     TASK CREATED  Caller: Self; Renew Medication; [phone] (Home)  Pt lmom requesting a refill of gabapentin 300mg she has been out of it for a week  Spoke to pt, she was taking gabapentin 300mg one tab at  and ran out of medication 1 week ago  I advised pt that she should have called the office when she had 2-3 days left of medication so a refill could have been sent in, gabapentin is not a medication that she should just be stopping  Pt's [facility]  Pt has an [facility] on 2/28 w/Blank Bowie - 08 Feb 2017 10:45 AM     TASK EDITED   Mayr Ann Massa - 08 Feb 2017 12:52 PM     TASK REPLIED TO: Previously Assigned To Mary Ann De La Vega                      Refill sent to pharmacy   Blank Scanlon - 08 Feb 2017 1:03 PM     TASK EDITED  Pt aware  Active Problems    1  Anxiety (300 00) (F41 9)   2  Arthralgia Of Shoulder Region (719 41)   3  Back pain (724 5) (M54 9)   4  Bipolar II disorder (296 89) (F31 81)   5  Chronic pain of both knees (075 62,759 19) (M25 561,M25 562,G89 29)   6  Cognitive disorder (294 9) (F09)   7  Depression (311) (F32 9)   8  Esophageal reflux (530 81) (K21 9)   9  Fatigue (780 79) (R53 83)   10  Female pelvic pain (625 9) (R10 2)   11  Fibromyalgia (729 1) (M79 7)   12  Generalized anxiety disorder (300 02) (F41 1)   13  Hypertension (401 9) (I10)   14  Hypokalemia (276 8) (E87 6)   15  Insomnia (780 52) (G47 00)   16  Left knee pain (719 46) (M25 562)   17  Lumbar spondylosis (721 3) (M47 816)   18  Major depressive disorder, recurrent episode, moderate degree (296 32) (F33 1)   19  Migraine (346 90) (G43 909)   20   History of Need for prophylactic vaccination and inoculation against influenza (V04 81)    (Z23)   21  Osteoarthritis of both hips, unspecified osteoarthritis type (715 95) (M16 0)   22  Osteoarthritis of knee (715 36) (M17 9)   23  Overactive bladder (596 51) (N32 81)   24  Pain, joint, hip (719 45) (M25 559)   25  Panic disorder without agoraphobia (300 01) (F41 0)   26  Post traumatic stress disorder (309 81) (F43 10)   27  Recent unexplained weight loss (783 21) (R63 4)   28  Right knee pain (719 46) (M25 561)   29  Tremor (781 0) (R25 1)   30  Urinary incontinence (788 30) (R32)   31  Vitamin D deficiency (268 9) (E55 9)    Current Meds   1  Acetaminophen-Codeine #3 300-30 MG Oral Tablet; TAKE 1 TABLET 3 TIMES DAILY AS   NEEDED FOR PAIN;   Therapy: 27COQ1375 to (Evaluate:10Mar2017); Last GL:93SLJ5884 Ordered   2  AmLODIPine Besylate 10 MG Oral Tablet; TAKE 1 TABLET DAILY; Therapy: 49VKF3188 to (Evaluate:01Jun2017)  Requested for: 37GRH0464; Last   Rx:06Jun2016 Ordered   3  Aspirin 81 MG TABS; TAKE 1 TABLET DAILY; Therapy: 24LSA8060 to (Evaluate:18Feb2017)  Requested for: 46Tse3283; Last   Rx:79Bri9828 Ordered   4  Baclofen 10 MG Oral Tablet; TAKE 1 TABLET Bedtime; Therapy: 05Qdi5308 to (Evaluate:63Vxi3920)  Requested for: 74Wvc6321; Last   Rx:56Nyc8821 Ordered   5  Diclofenac Potassium 50 MG Oral Tablet; TAKE 1 TABLET TWICE DAILY AFTER MEALS; Therapy: 82RIV9464 to (Mehran Brigitte)  Requested for: 52IRY4022; Last   Rx:09Jan2017 Ordered   6  Gabapentin 300 MG Oral Capsule; TAKE 1 CAPSULE Bedtime; Therapy: 18NYJ0242 to ([place])  Requested for: 43IFL7163; Last   Rx:80Plt1888 Ordered   7  LORazepam 2 MG Oral Tablet; TAKE 1 TABLET 3 TIMES DAILY; Therapy: 37MZD7232 to (Evaluate:13Mar2017); Last Rx:06Bqi5971 Ordered   8  Omeprazole 20 MG Oral Capsule Delayed Release; TAKE 1 CAPSULE DAILY EVERY   MORNING BEFORE BREAKFAST; Therapy: 38NMQ5011 to (Evaluate:18Feb2017)  Requested for: 63Wye3573; Last   Rx:32Cjw6672 Ordered   9   Primidone 250 MG Oral Tablet; TAKE 1 TABLET AT BEDTIME; Therapy: 88NIY5078 to (Evaluate:21Feb2017)  Requested for: 24Oct2016; Last   Rx:11Ybb5964 Ordered   10  Propranolol HCl - 20 MG Oral Tablet; Take 1 tablet twice daily; Therapy: 88LDH4423 to (Evaluate:01Jun2017)  Requested for: 48ODX8787; Last    QW:35PGD8422 Ordered   11  RisperiDONE 2 MG Oral Tablet (RisperDAL); TAKE 1 TABLET Bedtime; Therapy: 67BJT8680 to (Evaluate:13Mar2017)  Requested for: 03Fgl9715; Last    Rx:13Qsx7429 Ordered   12  TraMADol HCl - 50 MG Oral Tablet; TAKE 1 TABLET 3 TIMES DAILY AS NEEDED; Therapy: 73BZQ9033 to (Evaluate:24Apr2016); Last Rx:46Swy7602 Ordered   13  TraZODone HCl - 150 MG Oral Tablet; TAKE 1 TABLET Once At Bedtime; Therapy: 38PXO2656 to (Evaluate:13Mar2017)  Requested for: 70Mov5483; Last    Rx:68Wtp8350 Ordered   14  Venlafaxine HCl - 25 MG Oral Tablet; TAKE 1 TABLET 3 TIMES DAILY WITH FOOD; Therapy: 37FDZ5720 to (Evaluate:13Mar2017)  Requested for: 97Nks0199; Last    Rx:27Don1844 Ordered   15  VESIcare 10 MG Oral Tablet; Take 1 tablet daily; Therapy: 47WJM3450 to (Evaluate:04Sep2017)  Requested for: 09BAO3428; Last    Rx:99Sjm6778 Ordered   16  VESIcare 10 MG Oral Tablet; Take 1 tablet daily; Therapy: 41XRM1990 to (Evaluate:22Jun2016); Last Rx:24Mar2016 Ordered   17  Vitamin D3 1000 UNIT Oral Capsule; TAKE 1 CAPSULE DAILY; Therapy: 66YYY8217 to (Evaluate:18Feb2017)  Requested for: 65Tni6945; Last    Rx:11Bji0793 Ordered    Allergies    1   No Known Drug Allergies    Signatures   Electronically signed by : Efren Merchant RN; Feb 8 2017  1:04PM EST                       (Author)

## 2018-01-10 NOTE — MISCELLANEOUS
Message   Recorded as Task   Date: 03/13/2017 12:43 PM, Created By: Marizol Gilbert   Task Name: Miscellaneous   Assigned To: [facility]   Regarding Patient: Demi Lora, Status: In Progress   Comment:    Cristine Mendez - 13 Mar 2017 12:43 PM     TASK CREATED  pt called in again still complaining about a lot of pain and wants to have a call back  [phone]   Estelle Russell - 13 Mar 2017 1:10 PM     TASK EDITED  S/W pt- pt has back pain 8-9/10 for the last week  All of a sudden getting muscle spams in back, consistent knots  Gets bad muscle spasms when laying and moving arms  She tries to lay on her sides  Hasn't had spasms for a year  She has been hurting all week  B/l hips feel better  She is using icy hot which helps alittle, Tylenol #3 1 tablet 3x per day, baclofen 1 tablet at hs and Dicofenac 1 tablet 2x/day  She is concerned about being in a car for 20 hrs to see her brother in [facility] dying from colon ca  Going to [facility] sometime in April  She wants to know what to take for spasms, she prefers it not to be a medication  But she will if she has to  She has an appt April 25- wants one before then b/c of pain and going to [facility]  Leoncio Eaton - 13 Mar 2017 4:26 PM     TASK REPLIED TO: Previously Assigned To Vikash Kendall                      I have increased her baclofen to 10 mg twice a day when necessary when the muscle spasms become severe  She can also try moist heat to the area and she uses a heating patches to apply for 20 minutes on and at least 20 minutes off to avoid burns to the skin  The patient may also consider over-the-counter lidocaine patches which are made in 2% and 4% she can apply topically to the area and she is to follow the instructions on the package   Blank Scanlon - 13 Mar 2017 4:37 PM     TASK IN PROGRESS   Blank Scanlon - 13 Mar 2017 4:42 PM     TASK EDITED  Pt aware verbalized understanding   sovs rescheduled to 3/22 w/Jenaro per pt request as she will be traveling to [place] soon  Active Problems    1  Analgesic use (V58 69) (Z79 899)   2  Anxiety (300 00) (F41 9)   3  Arthralgia Of Shoulder Region (719 41)   4  Back pain (724 5) (M54 9)   5  Bipolar II disorder (296 89) (F31 81)   6  Chronic pain of both knees (290 13,103 78) (M25 561,M25 562,G89 29)   7  Chronic pain syndrome (338 4) (G89 4)   8  Cognitive disorder (294 9) (F09)   9  Depression (311) (F32 9)   10  Esophageal reflux (530 81) (K21 9)   11  Fatigue (780 79) (R53 83)   12  Female pelvic pain (625 9) (R10 2)   13  Fibromyalgia (729 1) (M79 7)   14  Generalized anxiety disorder (300 02) (F41 1)   15  Hypertension (401 9) (I10)   16  Hypokalemia (276 8) (E87 6)   17  Insomnia (780 52) (G47 00)   18  Left knee pain (719 46) (M25 562)   19  Lumbar spondylosis (721 3) (M47 816)   20  Major depressive disorder, recurrent episode, moderate degree (296 32) (F33 1)   21  Migraine (346 90) (G43 909)   22  History of Need for prophylactic vaccination and inoculation against influenza (V04 81)    (Z23)   23  Opioid dependence (304 00) (F11 20)   24  Osteoarthritis of both hips, unspecified osteoarthritis type (715 95) (M16 0)   25  Osteoarthritis of knee (715 36) (M17 9)   26  Overactive bladder (596 51) (N32 81)   27  Pain, joint, hip (719 45) (M25 559)   28  Panic disorder without agoraphobia (300 01) (F41 0)   29  Post traumatic stress disorder (309 81) (F43 10)   30  Recent unexplained weight loss (783 21) (R63 4)   31  Right knee pain (719 46) (M25 561)   32  Sacroiliitis (720 2) (M46 1)   33  Tremor (781 0) (R25 1)   34  Urinary incontinence (788 30) (R32)   35  Vitamin D deficiency (268 9) (E55 9)    Current Meds   1  Acetaminophen-Codeine #3 300-30 MG Oral Tablet; TAKE 1 TABLET 3 TIMES DAILY AS   NEEDED FOR PAIN;   Therapy: 53REE4074 to (Evaluate:29Apr2017); Last Rx:78Itl2996 Ordered   2  AmLODIPine Besylate 10 MG Oral Tablet; TAKE 1 TABLET DAILY;    Therapy: 75DHM0226 to (Evaluate:01Jun2017) Requested for: 81TWV7809; Last   WK:52IWW0173 Ordered   3  Aspirin 81 MG Oral Tablet Delayed Release; take 1 tablet by mouth daily; Therapy: 04BKM1561 to (Evaluate:81Gdx2576)  Requested for: 52BPV6438; Last   Rx:24Feb2017 Ordered   4  Baclofen 10 MG Oral Tablet; Take 1 tablet twice a day when necessary muscle spasm; Therapy: 14Vnr3352 to (Evaluate:12May2017)  Requested for: 05CKE1280; Last   Rx:13Mar2017 Ordered   5  Diclofenac Potassium 50 MG Oral Tablet; TAKE 1 TABLET TWICE DAILY AFTER MEALS; Therapy: 67SKR2139 to (Suleiman Peres)  Requested for: 38TEK1498; Last   Rx:09Jan2017 Ordered   6  Gabapentin 300 MG Oral Capsule; TAKE 1 CAPSULE Bedtime; Therapy: 02ZCA8775 to (Evaluate:29Apr2017)  Requested for: 55OIL5936; Last   Rx:28Feb2017 Ordered   7  LORazepam 2 MG Oral Tablet; TAKE 1 TABLET 3 TIMES DAILY; Therapy: 07DAV8833 to (Evaluate:11Jun2017); Last Rx:08Mar2017 Ordered   8  Omeprazole 20 MG Oral Capsule Delayed Release; TAKE 1 CAPSULE DAILY EVERY   MORNING BEFORE BREAKFAST; Therapy: 61OLW8087 to (Evaluate:18Feb2017)  Requested for: 45Ujp7972; Last   Rx:82Klm5716 Ordered   9  Primidone 250 MG Oral Tablet; TAKE 1 TABLET AT BEDTIME; Therapy: 53YOZ8610 to (Evaluate:41Itq0720)  Requested for: 82QUT0639; Last   Rx:24Feb2017 Ordered   10  Propranolol HCl - 20 MG Oral Tablet; Take 1 tablet twice daily; Therapy: 93OLW9397 to (Evaluate:01Jun2017)  Requested for: 97MVW9996; Last    HI:20JDZ7678 Ordered   11  RisperiDONE 2 MG Oral Tablet (RisperDAL); TAKE 1 TABLET Bedtime; Therapy: 86SLK4333 to (Evaluate:11Jun2017)  Requested for: 71TRE4885; Last    Rx:08Mar2017 Ordered   12  TraZODone HCl - 150 MG Oral Tablet; TAKE 1 TABLET Once At Bedtime; Therapy: 93FTM2141 to (Evaluate:16Jun2017)  Requested for: 95XIA3548; Last    Rx:08Mar2017 Ordered   13  Venlafaxine HCl - 25 MG Oral Tablet; TAKE 1 TABLET 3 TIMES DAILY WITH FOOD;     Therapy: 67YTE5108 to (Evaluate:11Jun2017)  Requested for: 66DGC0568; Last Rx:08Mar2017 Ordered   14  VESIcare 10 MG Oral Tablet; Take 1 tablet daily; Therapy: 42HSJ0837 to (Evaluate:22Jun2016); Last Rx:24Mar2016 Ordered   15  Vitamin D3 1000 UNIT Oral Capsule; TAKE 1 CAPSULE DAILY; Therapy: 04EVW3392 to (Evaluate:95Lmh1346)  Requested for: 95NZK1530; Last    Rx:24Feb2017 Ordered    Allergies    1   No Known Drug Allergies    Signatures   Electronically signed by : Zen Juarez RN; Mar 13 2017  4:42PM EST                       (Author)

## 2018-01-10 NOTE — PSYCH
Psych Med Mgmt    Appearance: was calm and cooperative, adequate hygiene and grooming and good eye contact  Observed mood: anxious  Observed mood: affect was constricted  Speech: a normal rate and fluent  Thought processes: coherent/organized  Hallucinations: no hallucinations present  Thought Content: no delusions  Abnormal Thoughts: The patient has no suicidal thoughts and no homicidal thoughts  Orientation: The patient is oriented to person, place and time, oriented to person, oriented to place and oriented to time  Recent and Remote Memory: short term memory intact and long term memory intact  Attention Span And Concentration: concentration impaired  Insight: Limited insight  Judgment: Her judgment was limited  Muscle Strength And Tone  Muscle strength and tone were normal    The patient is experiencing moderate to severe pain  Goals addressed in session: Medication Management       Treatment Recommendations: Continue current medications  Risks, Benefits And Possible Side Effects Of Medications: Risks, benefits, and possible side effects of medications explained to patient and patient verbalizes understanding  Discussed with patient Black Box warning on concurrent use of benzodiazepines and opioid medications including sedation, respiratory depression, coma and death  Patient understands the risk of treatment with benzodiazepines in addition to opioids and wants to continue taking those medications  Discussed with patient the risks of sedation, respiratory depression, impairment of ability to drive and potential for abuse and addiction related to treatment with benzodiazepine medications  The patient understands risk of treatment with benzodiazepine medications, agrees to not drive if feels impaired and agrees to take medications as prescribed          The patient has been filling controlled prescriptions on time as prescribed to [place] [facility] Drug Monitoring program       She reports normal appetite, decreased energy, no weight change and normal number of sleep hours  Mood has been stable  She stated her main concern is her physical health and stated she has been dealing with a lot of physical pain  She is taking pain medications and the clinic is aware that [facility] has been taking Lorazepam 2 mg tid for a long time and we had multiple attempts to reduce that and she will become extremely anxious  Assessment    1  Bipolar II disorder (296 89) (F31 81)   2  Major depressive disorder, recurrent episode, moderate degree (296 32) (F33 1)   3  Generalized anxiety disorder (300 02) (F41 1)   4  Post traumatic stress disorder (309 81) (F43 10)   5  Panic disorder without agoraphobia (300 01) (F41 0)    Plan    1  LORazepam 2 MG Oral Tablet; TAKE 1 TABLET THREE TIMES A DAY AS   NEEDED *DO NOT FILL UNTIL 8/21 PERMD FOR TRAVEL*   2  Venlafaxine HCl - 25 MG Oral Tablet; TAKE 1 TABLET 3 TIMES DAILY WITH FOOD    3  TraZODone HCl - 150 MG Oral Tablet; TAKE 1 TABLET Once At Bedtime    4  RisperiDONE 2 MG Oral Tablet (RisperDAL); TAKE 1 TABLET Bedtime    Review of Systems    Constitutional: No fever, no chills, feels well, no tiredness, no recent weight gain or loss  Cardiovascular: no complaints of slow or fast heart rate, no chest pain, no palpitations  Respiratory: no complaints of shortness of breath, no wheezing, no dyspnea on exertion  Gastrointestinal: no complaints of abdominal pain, no constipation, no nausea, no diarrhea, no vomiting  Genitourinary: no complaints of dysuria, no incontinence, no pelvic pain, no urinary frequency  Musculoskeletal: no complaints of arthralgia, no myalgias, no limb pain, no joint stiffness  Integumentary: no complaints of skin rash, no itching, no dry skin  Neurological: no complaints of headache, no confusion, no numbness, no dizziness  Substance Abuse Hx    Substance Abuse History: Denies  Active Problems    1  Analgesic use (V58 69) (Z79 899)   2  Anxiety (300 00) (F41 9)   3  Arthralgia Of Shoulder Region (719 41)   4  Back pain (724 5) (M54 9)   5  Bereavement (V62 82) (Z63 4)   6  Bilateral hip pain (719 45) (M25 551,M25 552)   7  Bipolar II disorder (296 89) (F31 81)   8  Chronic bilateral low back pain with bilateral sciatica (724 2,724 3,338 29)   (M54 42,M54 41,G89 29)   9  Chronic pain of both knees (750 70,216 95) (M25 561,M25 562,G89 29)   10  Chronic pain syndrome (338 4) (G89 4)   11  Cognitive disorder (294 9) (F09)   12  Esophageal reflux (530 81) (K21 9)   13  Fatigue (780 79) (R53 83)   14  Female pelvic pain (625 9) (R10 2)   15  Fibromyalgia (729 1) (M79 7)   16  Generalized anxiety disorder (300 02) (F41 1)   17  Hypertension (401 9) (I10)   18  Hypokalemia (276 8) (E87 6)   19  Insomnia (780 52) (G47 00)   20  Lumbar spondylosis (721 3) (M47 816)   21  Major depressive disorder, recurrent episode, moderate degree (296 32) (F33 1)   22  Migraine (346 90) (G43 909)   23  History of Need for prophylactic vaccination and inoculation against influenza (V04 81)    (Z23)   24  Opioid dependence (304 00) (F11 20)   25  Osteoarthritis of both hips, unspecified osteoarthritis type (715 95) (M16 0)   26  Osteoarthritis of knee (715 36) (M17 10)   27  Overactive bladder (596 51) (N32 81)   28  Pain, joint, hip (719 45) (M25 559)   29  Panic disorder without agoraphobia (300 01) (F41 0)   30  Post traumatic stress disorder (309 81) (F43 10)   31  Primary localized osteoarthritis of both knees (715 16) (M17 0)   32  Recent unexplained weight loss (783 21) (R63 4)   33  Right knee pain (719 46) (M25 561)   34  Sacroiliitis (720 2) (M46 1)   35  Tremor (781 0) (R25 1)   36  Urinary incontinence (788 30) (R32)   37  Vitamin D deficiency (268 9) (E55 9)    Past Medical History    1  History of Acute nonsuppurative otitis media, unspecified laterality (381 00) (H65 199)   2   History of Bipolar disorder (296 80) (F31 9)   3  History of Encounter for screening colonoscopy (V76 51) (Z12 11)   4  Fibromyalgia (729 1) (M79 7)   5  History of H/O colonoscopy (V45 89) (Z98 890)   6  History of nausea and vomiting (V12 79) (Z87 898)   7  History of stroke (V12 54) (Z86 73)   8  History of urinary tract infection (V13 02) (Z87 440)   9  History of Left knee pain (719 46) (M25 562)   10  History of Memory loss (780 93) (R41 3)   11  History of Mixed Anxiety Disorder (300 00)   12  History of Need for hepatitis C screening test (V73 89) (Z11 59)   13  History of Need for prophylactic vaccination and inoculation against influenza (V04 81)    (Z23)   14  History of Need for Tdap vaccination (V06 1) (Z23)   15  Personal history of arthritis (V13 4) (Z87 39)   16  History of Previous Spontaneous Vaginal Delivery   17  History of Screening for lipoid disorders (V77 91) (Z13 220)   18  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)   19  History of Visit for screening mammogram (V76 12) (Z12 31)    The active problems and past medical history were reviewed and updated today  Allergies    1  No Known Drug Allergies    Current Meds   1  Acetaminophen-Codeine #3 300-30 MG Oral Tablet; TAKE 1 TABLET TWICE DAILY AS   NEEDED FOR PAIN;   Therapy: 16AYK2014 to (Evaluate:98Hyj7277); Last Rx:88Pqd0464 Ordered   2  AmLODIPine Besylate 10 MG Oral Tablet; TAKE 1 TABLET DAILY; Therapy: 59UAE5340 to (Evaluate:82Yog8415)  Requested for: 55IPC4259; Last   Rx:39Jld1613 Ordered   3  Aspirin 81 MG Oral Tablet Delayed Release; take 1 tablet by mouth daily; Therapy: 17OPP9365 to (Evaluate:53Xab5323)  Requested for: 11ORQ0383; Last   Rx:60Aux4822 Ordered   4  Gabapentin 300 MG Oral Capsule; Take one capsule at bedtime with one 600 mg tablet   to equal a total of 900mg at bedtime for leg pain; Therapy: 72PUQ9823 to (YVITYT:37AQK5410)  Requested for: 21ISE2878; Last   Rx:13Oct2017 Ordered   5   Gabapentin 600 MG Oral Tablet; Take 1 po hs; Therapy: 43RVJ6097 to (Evaluate:11Jan2018)  Requested for: 13Oct2017; Last   Rx:13Oct2017 Ordered   6  LORazepam 2 MG Oral Tablet; TAKE 1 TABLET THREE TIMES A DAY AS NEEDED *DO   NOT FILL UNTIL 8/21 PERMD FOR TRAVEL*;   Therapy: 04IAO0337 to (Evaluate:25Oct2017)  Requested for: 32LIM7696; Last   Rx:41Chr7444 Ordered   7  Meloxicam 7 5 MG Oral Tablet; Take 1 tablet twice daily as needed; Therapy: 46JIP6470 to (Last Rx:06Oct2017)  Requested for: 99CNN2686 Ordered   8  Morphine Sulfate ER 15 MG Oral Tablet Extended Release; take 1 po in am;   Therapy: 05CAX7656 to (Evaluate:12Nov2017); Last Rx:13Oct2017 Ordered   9  Omeprazole 20 MG Oral Capsule Delayed Release; TAKE 1 CAPSULE DAILY EVERY   MORNING BEFORE BREAKFAST; Therapy: 06PJE4279 to (Evaluate:13May2018)  Requested for: 58YGK0926; Last   Rx:18May2017 Ordered   10  Primidone 250 MG Oral Tablet; TAKE 1 TABLET AT BEDTIME; Therapy: 97HKA1134 to (Bettie Pearce)  Requested for: 28Sjb8652; Last    Rx:08Qay1583 Ordered   11  Propranolol HCl - 20 MG Oral Tablet; Take 1 tablet twice daily; Therapy: 05HCL3088 to (Evaluate:13May2018)  Requested for: 05WDI0235; Last    Rx:79Kza9279 Ordered   12  RisperiDONE 2 MG Oral Tablet; TAKE 1 TABLET Bedtime; Therapy: 75KZY4049 to (Marylou Devlin)  Requested for: 65Iec6294; Last    Rx:03Aug2017 Ordered   13  TiZANidine HCl - 2 MG Oral Tablet; Take 1 po tid prn spasms; Therapy: 99UFZ1351 to (XMRQZSKC:37FXA1005)  Requested for: 65QEV3786; Last    Rx:13Oct2017 Ordered   14  TraZODone HCl - 150 MG Oral Tablet; TAKE 1 TABLET Once At Bedtime; Therapy: 26JSC2324 to (Marylou Devlin)  Requested for: 81Wkb5225; Last    Rx:03Aug2017 Ordered   15  Venlafaxine HCl - 25 MG Oral Tablet; TAKE 1 TABLET 3 TIMES DAILY WITH FOOD; Therapy: 70YDY3478 to (Marylou Devlin)  Requested for: 55Bhx7443; Last    Rx:13Qiu1312 Ordered   16  VESIcare 10 MG Oral Tablet; Take 1 tablet daily;     Therapy: 33JHT6867 to (Venia January)  Requested for: 35WOM3051; Last    Rx:61Pnw2385 Ordered   17  Vitamin D3 1000 UNIT Oral Capsule; TAKE 1 CAPSULE DAILY; Therapy: 80TFF0509 to (Morenita Romero)  Requested for: 63WTJ9760; Last    Rx:18Ywn0365 Ordered    The medication list was reviewed and updated today  Family Psych History  Mother    1  Family history of Colon Cancer (V16 0)  Father    2  Family history of Alzheimer's disease (V17 2) (Z82 0)   3  Family history of cerebrovascular accident (V17 1) (Z82 3)  Son    4  Family history of seizures (V19 8) (Z84 89)  Brother    5  Family history of Colon cancer   6  Family history of bipolar disorder (V17 0) (Z81 8)  Maternal Aunt    7  Family history of Breast Cancer (V16 3)  Unknown    8  Family history of cardiac disorder (V17 49) (Z82 49)   9  Family history of diabetes mellitus (V18 0) (Z83 3)   10  Family history of hypertension (V17 49) (Z82 49)    The family history was reviewed and updated today  Social History    · Being A Social Drinker   · Bereavement (V62 82) (Z63 4)   · Daily caffeine consumption, 6-8 servings a day   ·    · Education Level: Graduate school   · Lives in [place]   · Never A Smoker   · No drug use  The social history was reviewed and updated today  The social history was reviewed and is unchanged  End of Encounter Meds    1  Aspirin 81 MG Oral Tablet Delayed Release; take 1 tablet by mouth daily; Therapy: 32UYB5528 to (Evaluate:22Sgz0521)  Requested for: 74RON0043; Last   Rx:19Tpa4414 Ordered    2  Gabapentin 300 MG Oral Capsule; Take one capsule at bedtime with one 600 mg tablet   to equal a total of 900mg at bedtime for leg pain; Therapy: 05IMA5194 to (EADVDJZQ:57KKM8673)  Requested for: 91HRL0110; Last   Rx:13Oct2017 Ordered    3  Morphine Sulfate ER 15 MG Oral Tablet Extended Release; take 1 po in am;   Therapy: 93OFC0334 to (Evaluate:12Nov2017); Last Rx:13Oct2017 Ordered    4   TiZANidine HCl - 2 MG Oral Tablet; Take 1 po tid prn spasms; Therapy: [facility]LWD2043 [facility]  Requested for: 61SHQ1816; Last   Rx:2017 Ordered    5  Omeprazole 20 MG Oral Capsule Delayed Release; TAKE 1 CAPSULE DAILY EVERY   MORNING BEFORE BREAKFAST; Therapy: 53CTY3373 to (Evaluate:2018)  Requested for: 70PBK9010; Last   Rx:2017 Ordered    6  LORazepam 2 MG Oral Tablet; TAKE 1 TABLET THREE TIMES A DAY AS NEEDED *DO   NOT FILL UNTIL  PERMD FOR TRAVEL*;   Therapy: 34CHC1297 to (Evaluate:21Irn7288)  Requested for: 39ORI4471; Last   Rx:2017 Ordered   7  Venlafaxine HCl - 25 MG Oral Tablet; TAKE 1 TABLET 3 TIMES DAILY WITH FOOD; Therapy: 12MII4731 to (487)  Requested for: 99HMX7010; Last   Rx:2017; Status: ACTIVE - Transmit to [facility] Verification Ordered    8  AmLODIPine Besylate 10 MG Oral Tablet; TAKE 1 TABLET DAILY; Therapy: 65KAQ4659 to (Evaluate:2018)  Requested for: 51QQZ9426; Last   Rx:2017 Ordered    9  TraZODone HCl - 150 MG Oral Tablet; TAKE 1 TABLET Once At Bedtime; Therapy: 06UYU3984 to (487)  Requested for: 93GYZ4787; Last   Rx:2017; Status: ACTIVE - Transmit to [facility] Verification Ordered    10  Gabapentin 600 MG Oral Tablet; Take 1 po hs; Therapy: 40ZJX0578 to (Evaluate:2018)  Requested for: 2017; Last    Rx:2017 Ordered    11  Propranolol HCl - 20 MG Oral Tablet; Take 1 tablet twice daily; Therapy: 93FAX6858 to (Evaluate:2018)  Requested for: 70EXG7145; Last    Rx:2017 Ordered    12  Acetaminophen-Codeine #3 300-30 MG Oral Tablet; TAKE 1 TABLET TWICE DAILY AS    NEEDED FOR PAIN;    Therapy: 18SLC4185 to (Evaluate:92Syq1650); Last Rx:2017 Ordered    13  Meloxicam 7 5 MG Oral Tablet; Take 1 tablet twice daily as needed; Therapy: 40YOU1514 to (Last Rx:2017)  Requested for: 55GIM3910 Ordered    14  VESIcare 10 MG Oral Tablet; Take 1 tablet daily;     Therapy: 14ZSL5076 to (Albin Condon)  Requested for: 74FTB8688; Last    Rx:15Nby2089 Ordered    15  RisperiDONE 2 MG Oral Tablet (RisperDAL); TAKE 1 TABLET Bedtime; Therapy: 87XPK6804 to (Loreto Plum)  Requested for: 95TIC4120; Last    Rx:07Nov2017; Status: ACTIVE - Transmit to [facility] Verification Ordered    16  Primidone 250 MG Oral Tablet; TAKE 1 TABLET AT BEDTIME; Therapy: 61NJV1904 to (Johnny Dave)  Requested for: 34NMD2648; Last    Rx:34Mhp9644 Ordered    17  Vitamin D3 1000 UNIT Oral Capsule; TAKE 1 CAPSULE DAILY;     Therapy: 13EHU0482 to (Evaluate:39Ouy1161)  Requested for: 68NSQ0351; Last    SQ:69WAS5137 Ordered    Future Appointments    Date/Time Provider Specialty Site   12/21/2017 11:15 AM Emery Maloney MS, APRN, PMHCNS_BS  [address] [facility]   12/08/2017 01:45 PM Huma Tom DO Pain Management [address]     Signatures   Electronically signed by : ASHLEY Diaz ; Nov 7 2017 10:36AM EST                       (Author)

## 2018-01-10 NOTE — PSYCH
Treatment Plan Tracking    #1 Treatment Plan not completed within required time limits due to: Client presented with emotional/behavioral issues that required clinical intervention            Signatures   Electronically signed by : Washington Vaughan MSAPRNPMHCNJUNI; Nikolay 10 2017  2:25PM EST                       (Author)

## 2018-01-10 NOTE — RESULT NOTES
Message   Recorded as Task   Date: 06/13/2017 09:06 AM, Created By: Rosaura Galindo   Task Name: Call Back   Assigned To: [facility],Team   Regarding Patient: Tony Moreno, Status: Active   Comment:    Rosaura Galindo - 13 Jun 2017 9:06 AM     TASK CREATED  [facility]- patient called to inform DG that she was in the ER because she cant walk and the pain is so bad   patient stated she is taking the meds that he prescribed  patient stated that the ER told her to f/u w/ DG   patient is scheduled 07/12/17  Patient stated she really didn't want to call as she feels DG was upset w/ her because she is constantly calling about the same issue  Patient stated she wasn't able to go on her trip  any questions please call patient [phone]  Desirae Renoy - 13 Jun 2017 9:54 AM     TASK EDITED  S/w the pt  and she states she went to [facility] last night  They medicated her with IV toredol and basically did nothing because she is being followed by pain management  The pain continues in her BLE's, mostly the anterior part of her Bilateral thighs, radiates to her bilateral hips and goes into the groin area  She is having difficulty ambulating and after taking a couple of steps, she find it hard to move  She is due to see Dr Janet Posada on friday to have bilateral knee injections  She continues on the prescribed pain medications  Emotional support provided  JW to advise  Mag Link - 13 Jun 2017 4:37 PM     TASK REPLIED TO: Previously Assigned To Aurora Link                      The patient was prescribed morphine ER 15 mg daily and Tylenol No  3 twice a day when necessary for breakthrough pain  The patient can also supplement with either the diclofenac 50 mg twice a day when necessary that was prescribed, or if this was not effective she can take ibuprofen up to 800 mg every 8 hours when necessary and she should not exceed this dose, nor should she take any other NSAIDs along this medication  Unfortunately, we are running out of options to control the patient's pain  Other than the above recommendations I do not have any further recommendations at this time and she needs to be reevaluated at her next office visit before we can make any more changes to her medication regimen  Estelle Russell - 14 Jun 2017 8:36 AM     TASK EDITED  S/W pt  Advised pt of the same  Pt stated she will try the Ibuprofen  Pt requesting refill for the end of the month of Gabapentin 400mg, takes 1 capsule at [facility] on file  Please advise  Collin Calzada - 14 Jun 2017 8:48 AM     TASK REPLIED TO: Previously Assigned To Vikash Kendall                      Refill of gabapentin sent to pharmacy   Carolina Reno - 14 Jun 2017 9:21 AM     TASK EDITED  S/w the pt  to notify her of the gabapentin refill and she states she can't get OOB to walk to the store to buy the ibuprofen  She was verbally frustrated because she feels like all she is doing is taking medications that are not working and now she can hardly get OOb to walk becuase she has such excruciating pain  She thinks she needs a new xray or test to find out what is going on  She states she is loosing weight because she can't even cook for herself  She stated she can't go on like this and the ER did nothing for her, not even an xray  Emotional support provided and she had to cancel her trip because she can't walk  JW to advise  Mag Calzada - 14 Jun 2017 1:22 PM     TASK REPLIED TO: Previously Assigned To Collin Calzada                      Once again the patient needs to come in for evaluation for further treatment strategy  I am out of recommendations at this time  The pain is at severe she needs to go to the emergency room  Carolina Reno - 14 Jun 2017 1:35 PM     TASK EDITED  S/w the pt  and scheduled for reevaluation          Signatures   Electronically signed by : Vero Serra, ; Jun 14 2017  1:35PM EST                       (Author)

## 2018-01-10 NOTE — MISCELLANEOUS
Message   Recorded as Task   Date: 05/19/2017 01:45 PM, Created By: Chloe Quezada   Task Name: Miscellaneous   Assigned To: [facility],Team   Regarding Patient: Francoise Acharya, Status: Active   CommentMonty Jordan - 19 May 2017 1:45 PM     TASK CREATED  Pt came in to  script she stated she can not refill the one medication until 6/16 but will be out of the area ([place] 6/9-7/6  She is questioning how she will get it refilled  She also will need a script bc she needed to cx her appt for 7/5-she will not be back yet  Please call patient at [phone]  She not sure if she should take one, one day and skip the next day? Please call and advise  [facility] - 19 May 2017 2:35 PM     TASK EDITED  S/w pt  Pt stated the morphine script she picked up today has a due not fill to 6/16/17 and she is leaving 6/9/17  Pt stated she needs to cx her appt on 7/5 and she won't be able to get a July morphine script  Offered to reschedule her appt but she said she was in the [facility] that she will C/B to reschedule the appt  Please advise  **Pt aware DG is out of the office today and pt stated it is okay to call her back on monday  Jenaro Fonseca - 20 May 2017 7:39 AM     TASK REPLIED TO: Previously Assigned To Jenaro Fonseca  She will have to see if a family member can fill her script and ship it to her in [place]  There is nothing esle I can do about it because of her insurance issues and where she can fill scripts  Blank Scanlon - 22 May 2017 8:36 AM     TASK IN PROGRESS   Blank Scanlon - 22 May 2017 8:36 AM     TASK EDITED  Mynor Riddle speaking to pt, phone call disconnected  Called pt back received voicemail  Lmom for pt to call the office  Blank Scanlon - 22 May 2017 9:45 AM     TASK EDITED  Spoke to pt, aware of recommendations    Pt would like to know since her family is unreliable and she won't be able to get the morphine if she can take the tyl#3 and diclofenac  Both have refills  Please advise  Jenaro Fonseca - 22 May 2017 10:17 AM     TASK REPLIED TO: Previously Assigned To Jenaro Fonseca  If she starts the Morphine she shouldn't just stop it  So maybe she should wait to start the morphine until she is home and just continue the T#3 and Diclofenac  Blank Scanlon - 22 May 2017 10:26 AM     TASK EDITED  Spoke to pt, she has already started the morphine are their weaning instructions so she can take the tylenol#3? Jenaro Fonseca - 22 May 2017 10:45 AM     TASK REPLIED TO: Previously Assigned To Jenaro Fonseca  Just stop it as it is a low dose and she just started it  Blank Scanlon - 22 May 2017 10:54 AM     TASK EDITED  Pt aware & verbalized understanding  Socvs scheduled for 7/12 when she returns from [place]  Jenaro Fonseca - 22 May 2017 11:04 AM     TASK REPLIED TO: Previously Assigned To Jenaro Fonseca  Provider aware  Thank you  Active Problems    1  Analgesic use (V58 69) (Z79 899)   2  Anxiety (300 00) (F41 9)   3  Arthralgia Of Shoulder Region (719 41)   4  Back pain (724 5) (M54 9)   5  Bipolar II disorder (296 89) (F31 81)   6  Chronic bilateral low back pain with bilateral sciatica (724 2,724 3,338 29)   (M54 42,M54 41,G89 29)   7  Chronic pain of both knees (026 10,043 42) (M25 561,M25 562,G89 29)   8  Chronic pain syndrome (338 4) (G89 4)   9  Cognitive disorder (294 9) (F09)   10  Esophageal reflux (530 81) (K21 9)   11  Fatigue (780 79) (R53 83)   12  Female pelvic pain (625 9) (R10 2)   13  Fibromyalgia (729 1) (M79 7)   14  Generalized anxiety disorder (300 02) (F41 1)   15  Hypertension (401 9) (I10)   16  Hypokalemia (276 8) (E87 6)   17  Insomnia (780 52) (G47 00)   18  Lumbar spondylosis (721 3) (M47 816)   19  Major depressive disorder, recurrent episode, moderate degree (296 32) (F33 1)   20  Migraine (346 90) (G43 909)   21   History of Need for prophylactic vaccination and inoculation against influenza (V04 81) (Z23)   22  Opioid dependence (304 00) (F11 20)   23  Osteoarthritis of both hips, unspecified osteoarthritis type (715 95) (M16 0)   24  Osteoarthritis of knee (715 36) (M17 10)   25  Overactive bladder (596 51) (N32 81)   26  Pain, joint, hip (719 45) (M25 559)   27  Panic disorder without agoraphobia (300 01) (F41 0)   28  Post traumatic stress disorder (309 81) (F43 10)   29  Recent unexplained weight loss (783 21) (R63 4)   30  Right knee pain (719 46) (M25 561)   31  Sacroiliitis (720 2) (M46 1)   32  Tremor (781 0) (R25 1)   33  Urinary incontinence (788 30) (R32)   34  Vitamin D deficiency (268 9) (E55 9)    Current Meds   1  Acetaminophen-Codeine #3 300-30 MG Oral Tablet; TAKE 1 TABLET TWICE DAILY AS   NEEDED FOR PAIN;   Therapy: 98SVR5917 to (Evaluate:15Pox5231); Last Rx:10May2017 Ordered   2  AmLODIPine Besylate 10 MG Oral Tablet; TAKE 1 TABLET DAILY; Therapy: 77GDG7109 to (Evaluate:67Mes8217)  Requested for: 65ZCX0125; Last   Rx:18May2017 Ordered   3  Aspirin 81 MG Oral Tablet Delayed Release; take 1 tablet by mouth daily; Therapy: 29TQA9369 to (Evaluate:84Fmy7008)  Requested for: 14WXD3848; Last   Rx:73Gbl3796 Ordered   4  Diclofenac Potassium 50 MG Oral Tablet; TAKE 1 TABLET TWICE DAILY AFTER MEALS; Therapy: 17OOC5113 to (Susanne Jones)  Requested for: 64KQI0787; Last   Rx:09Jan2017 Ordered   5  Gabapentin 400 MG Oral Capsule; TAKE 1 CAPSULE AT BEDTIME; Therapy: 37MAX5142 to (Evaluate:07Bao4285)  Requested for: 77YBU5833; Last   Rx:10May2017 Ordered   6  LORazepam 2 MG Oral Tablet; TAKE 1 TABLET 3 TIMES DAILY; Therapy: 68NAH7794 to (Evaluate:11Jun2017); Last Rx:08Mar2017 Ordered   7  Morphine Sulfate ER 15 MG Oral Tablet Extended Release; take 1 po in am;   Therapy: 25HSH0887 to (Evaluate:18Jun2017); Last Rx:19May2017 Ordered   8  Omeprazole 20 MG Oral Capsule Delayed Release; TAKE 1 CAPSULE DAILY EVERY   MORNING BEFORE BREAKFAST;    Therapy: 30ONS0196 to (Evaluate:13May2018)  Requested for: 71LZG5272; Last   Rx:18May2017 Ordered   9  Primidone 250 MG Oral Tablet; TAKE 1 TABLET AT BEDTIME; Therapy: 67EPH9482 to (Evaluate:14Zys6245)  Requested for: 44IRN6906; Last   Rx:01Lka0147 Ordered   10  Propranolol HCl - 20 MG Oral Tablet; Take 1 tablet twice daily; Therapy: 75XCS1412 to (Evaluate:13May2018)  Requested for: 41DXA1341; Last    Rx:18May2017 Ordered   11  RisperiDONE 2 MG Oral Tablet (RisperDAL); TAKE 1 TABLET Bedtime; Therapy: 26SRL0239 to (Evaluate:11Jun2017)  Requested for: 36UHF2600; Last    Rx:08Mar2017 Ordered   12  TiZANidine HCl - 2 MG Oral Tablet; Take 1 po tid prn spasms; Therapy: 46HUW5499 to (Evaluate:72Tqc3103)  Requested for: 81BOM5247; Last    Rx:10May2017 Ordered   13  TraZODone HCl - 150 MG Oral Tablet; TAKE 1 TABLET Once At Bedtime; Therapy: 18KMZ7299 to (Evaluate:16Jun2017)  Requested for: 82TJZ4968; Last    Rx:08Mar2017 Ordered   14  Venlafaxine HCl - 25 MG Oral Tablet; TAKE 1 TABLET 3 TIMES DAILY WITH FOOD; Therapy: 52FYP6537 to (Evaluate:11Jun2017)  Requested for: 57ESZ7014; Last    Rx:08Mar2017 Ordered   15  VESIcare 10 MG Oral Tablet; Take 1 tablet daily; Therapy: 44OJB6467 to (Evaluate:22Jun2016); Last Rx:24Mar2016 Ordered   16  Vitamin D3 1000 UNIT Oral Capsule; TAKE 1 CAPSULE DAILY; Therapy: 54PLT8636 to (Evaluate:13May2018)  Requested for: 30OZX7846; Last    Rx:18May2017 Ordered    Allergies    1   No Known Drug Allergies    Signatures   Electronically signed by : Phillip Devlin RN; May 22 2017 11:10AM EST                       (Author)

## 2018-01-10 NOTE — PSYCH
Psych Med Mgmt    Appearance: was calm and cooperative, adequate hygiene and grooming and good eye contact  Observed mood: anxious  Observed mood: affect was constricted  Speech: a normal rate and fluent  Thought processes: coherent/organized  Hallucinations: no hallucinations present  Thought Content: no delusions  Abnormal Thoughts: The patient has no suicidal thoughts and no homicidal thoughts  Orientation: The patient is oriented to person, place and time, oriented to person, oriented to place and oriented to time  Recent and Remote Memory: short term memory intact and long term memory intact  Attention Span And Concentration: concentration impaired  Insight: Limited insight  Judgment: Her judgment was limited  Muscle Strength And Tone  Muscle strength and tone were normal    The patient is experiencing moderate to severe pain  Goals addressed in session: Medication Management     Treatment Recommendations: Continue current treatment  Risks, Benefits And Possible Side Effects Of Medications: Risks, benefits, and possible side effects of medications explained to patient and patient verbalizes understanding  She reports normal appetite, normal energy level, no weight change and normal number of sleep hours  Mood is anxious and stated that she is worried about her brother that has cancer and his prognosis is poor  She stated he lives in [place] and she worries that if she has to travel to [place] and she will run out of Lorazepam she is not sure how she could get her Rx  I explained that in the case that she leaves to [place] at the time she is due for RF she can call the office and give her location and pharmacy info so I can call a Rx  Assessment    1  Bipolar II disorder (296 89) (F31 81)   2  Major depressive disorder, recurrent episode, moderate degree (296 32) (F33 1)   3  Insomnia (780 52) (G47 00)    Plan    1   LORazepam 2 MG Oral Tablet; TAKE 1 TABLET 3 TIMES DAILY; Do Not Fill   Before: 07JYZ2490   2  Venlafaxine HCl - 25 MG Oral Tablet; TAKE 1 TABLET 3 TIMES DAILY WITH FOOD    3  TraZODone HCl - 150 MG Oral Tablet; TAKE 1 TABLET Once At Bedtime    4  RisperiDONE 2 MG Oral Tablet (RisperDAL); TAKE 1 TABLET Bedtime    Review of Systems    Constitutional: as noted in HPI  Active Problems    1  Analgesic use (V58 69) (Z79 899)   2  Anxiety (300 00) (F41 9)   3  Arthralgia Of Shoulder Region (719 41)   4  Back pain (724 5) (M54 9)   5  Bipolar II disorder (296 89) (F31 81)   6  Chronic pain of both knees (591 85,956 14) (M25 561,M25 562,G89 29)   7  Chronic pain syndrome (338 4) (G89 4)   8  Cognitive disorder (294 9) (F09)   9  Depression (311) (F32 9)   10  Esophageal reflux (530 81) (K21 9)   11  Fatigue (780 79) (R53 83)   12  Female pelvic pain (625 9) (R10 2)   13  Fibromyalgia (729 1) (M79 7)   14  Generalized anxiety disorder (300 02) (F41 1)   15  Hypertension (401 9) (I10)   16  Hypokalemia (276 8) (E87 6)   17  Insomnia (780 52) (G47 00)   18  Left knee pain (719 46) (M25 562)   19  Lumbar spondylosis (721 3) (M47 816)   20  Major depressive disorder, recurrent episode, moderate degree (296 32) (F33 1)   21  Migraine (346 90) (G43 909)   22  History of Need for prophylactic vaccination and inoculation against influenza (V04 81)    (Z23)   23  Opioid dependence (304 00) (F11 20)   24  Osteoarthritis of both hips, unspecified osteoarthritis type (715 95) (M16 0)   25  Osteoarthritis of knee (715 36) (M17 9)   26  Overactive bladder (596 51) (N32 81)   27  Pain, joint, hip (719 45) (M25 559)   28  Panic disorder without agoraphobia (300 01) (F41 0)   29  Post traumatic stress disorder (309 81) (F43 10)   30  Recent unexplained weight loss (783 21) (R63 4)   31  Right knee pain (719 46) (M25 561)   32  Sacroiliitis (720 2) (M46 1)   33  Tremor (781 0) (R25 1)   34  Urinary incontinence (788 30) (R32)   35   Vitamin D deficiency (268 9) (E55 9)    Past Medical History    1  History of Acute nonsuppurative otitis media, unspecified laterality (381 00) (H65 199)   2  History of Bipolar disorder (296 80) (F31 9)   3  Depression (311) (F32 9)   4  History of Encounter for screening colonoscopy (V76 51) (Z12 11)   5  Fibromyalgia (729 1) (M79 7)   6  History of H/O colonoscopy (V45 89) (Z98 890)   7  History of nausea and vomiting (V12 79) (Z87 898)   8  History of stroke (V12 54) (Z86 73)   9  History of urinary tract infection (V13 02) (Z87 440)   10  History of Memory loss (780 93) (R41 3)   11  History of Mixed Anxiety Disorder (300 00)   12  History of Need for hepatitis C screening test (V73 89) (Z11 59)   13  History of Need for prophylactic vaccination and inoculation against influenza (V04 81)    (Z23)   14  History of Need for Tdap vaccination (V06 1) (Z23)   15  Personal history of arthritis (V13 4) (Z87 39)   16  History of Previous Spontaneous Vaginal Delivery   17  History of Screening for lipoid disorders (V77 91) (Z13 220)   18  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)   19  History of Visit for screening mammogram (V76 12) (Z12 31)    The active problems and past medical history were reviewed and updated today  Allergies    1  No Known Drug Allergies    Current Meds   1  Acetaminophen-Codeine #3 300-30 MG Oral Tablet; TAKE 1 TABLET 3 TIMES DAILY AS   NEEDED FOR PAIN;   Therapy: 68BMS8965 to (Evaluate:29Apr2017); Last Rx:65Iyy1314 Ordered   2  AmLODIPine Besylate 10 MG Oral Tablet; TAKE 1 TABLET DAILY; Therapy: 40OIP7864 to (Evaluate:08Noc3530)  Requested for: 93DFV8976; Last   Rx:91Fvx5275 Ordered   3  Aspirin 81 MG Oral Tablet Delayed Release; take 1 tablet by mouth daily; Therapy: 12XNS6533 to (Evaluate:17Yfr0488)  Requested for: 91QMQ5907; Last   Rx:01Off7538 Ordered   4  Baclofen 10 MG Oral Tablet; TAKE 1 TABLET Bedtime; Therapy: 93Kzk2115 to (Evaluate:21Hkj1374)  Requested for: 24Oct2016; Last   Rx:24Oct2016 Ordered   5  Diclofenac Potassium 50 MG Oral Tablet; TAKE 1 TABLET TWICE DAILY AFTER MEALS; Therapy: 00WFD3375 to (Maddie Campbell)  Requested for: 53JXV1895; Last   Rx:09Jan2017 Ordered   6  Gabapentin 300 MG Oral Capsule; TAKE 1 CAPSULE Bedtime; Therapy: 26VAC5863 to (Evaluate:29Apr2017)  Requested for: 86XIW8188; Last   Rx:20Msh4101 Ordered   7  LORazepam 2 MG Oral Tablet; TAKE 1 TABLET 3 TIMES DAILY; Therapy: 55KKW2179 to (Evaluate:13Mar2017); Last Rx:71Kea3152 Ordered   8  Omeprazole 20 MG Oral Capsule Delayed Release; TAKE 1 CAPSULE DAILY EVERY   MORNING BEFORE BREAKFAST; Therapy: 44IAY3193 to (Evaluate:18Feb2017)  Requested for: 14Zxi5976; Last   Rx:47Czy1957 Ordered   9  Primidone 250 MG Oral Tablet; TAKE 1 TABLET AT BEDTIME; Therapy: 08YXL4967 to (Evaluate:21Dec2017)  Requested for: 69JSK8686; Last   Rx:43Xpj5997 Ordered   10  Propranolol HCl - 20 MG Oral Tablet; Take 1 tablet twice daily; Therapy: 95IDD6962 to (Evaluate:01Jun2017)  Requested for: 83QNI7655; Last    RP:27YTY8346 Ordered   11  RisperiDONE 2 MG Oral Tablet; TAKE 1 TABLET Bedtime; Therapy: 37GFK5299 to (Evaluate:13Mar2017)  Requested for: 92Oyu7317; Last    Rx:29Xsl6368 Ordered   12  TraZODone HCl - 150 MG Oral Tablet; TAKE 1 TABLET Once At Bedtime; Therapy: 71WCS5585 to (Evaluate:13Mar2017)  Requested for: 01Tez2682; Last    Rx:44Lef1564 Ordered   13  Venlafaxine HCl - 25 MG Oral Tablet; TAKE 1 TABLET 3 TIMES DAILY WITH FOOD; Therapy: 42ZSR7259 to (Evaluate:13Mar2017)  Requested for: 36Iqz2388; Last    Rx:13Yco5604 Ordered   14  VESIcare 10 MG Oral Tablet; Take 1 tablet daily; Therapy: 83UXA2323 to (Evaluate:22Jun2016); Last Rx:24Mar2016 Ordered   15  Vitamin D3 1000 UNIT Oral Capsule; TAKE 1 CAPSULE DAILY; Therapy: 45QWF0137 to (Evaluate:59Eer2849)  Requested for: 22GZA3787; Last    Rx:44Tqe6599 Ordered    The medication list was reviewed and updated today  Family Psych History  Mother    1   Family history of Colon Cancer (V16 0)  Father    2  Family history of Alzheimer's disease (V17 2) (Z82 0)   3  Family history of cerebrovascular accident (V17 1) (Z82 3)  Son    4  Family history of seizures (V19 8) (Z84 89)  Brother    5  Family history of bipolar disorder (V17 0) (Z81 8)  Maternal Aunt    6  Family history of Breast Cancer (V16 3)  Unknown    7  Family history of cardiac disorder (V17 49) (Z82 49)   8  Family history of diabetes mellitus (V18 0) (Z83 3)   9  Family history of hypertension (V17 49) (Z82 49)    The family history was reviewed and updated today  Social History    · Being A Social Drinker   · Daily caffeine consumption, 6-8 servings a day   ·    · Education Level: Graduate school   · Lives in [place]   · Never A Smoker   · No drug use  The social history was reviewed and updated today  The social history was reviewed and is unchanged  End of Encounter Meds    1  Aspirin 81 MG Oral Tablet Delayed Release; take 1 tablet by mouth daily; Therapy: 16KVB7066 to (Evaluate:87Uvi2009)  Requested for: 31ETD3935; Last   Rx:59Oye6918 Ordered   2  Baclofen 10 MG Oral Tablet; TAKE 1 TABLET Bedtime; Therapy: 08Qnh9654 to (Evaluate:37Gyd1800)  Requested for: 24Oct2016; Last   Rx:24Oct2016 Ordered    3  Omeprazole 20 MG Oral Capsule Delayed Release; TAKE 1 CAPSULE DAILY EVERY   MORNING BEFORE BREAKFAST; Therapy: 61HEW4285 to (Evaluate:67Tbq4543)  Requested for: 37Ofz8327; Last   Rx:88Mrj9025 Ordered    4  LORazepam 2 MG Oral Tablet; TAKE 1 TABLET 3 TIMES DAILY; Therapy: 75HEJ3372 to (Evaluate:11Jun2017); Last Rx:08Mar2017 Ordered   5  Venlafaxine HCl - 25 MG Oral Tablet; TAKE 1 TABLET 3 TIMES DAILY WITH FOOD; Therapy: 34XFE8142 to (Evaluate:11Jun2017)  Requested for: 22PBH8822; Last   Rx:08Mar2017; Status: ACTIVE - Transmit to Pharmacy - Awaiting Verification Ordered    6  AmLODIPine Besylate 10 MG Oral Tablet; TAKE 1 TABLET DAILY;    Therapy: 84USX0473 to (Evaluate:01Jun2017)  Requested for: 20MGM5609; Last   IX:13HUC8522 Ordered    7  TraZODone HCl - 150 MG Oral Tablet; TAKE 1 TABLET Once At Bedtime; Therapy: 15JTE0835 to (Evaluate:16Jun2017)  Requested for: 30BAO7531; Last   Rx:08Mar2017; Status: ACTIVE - Transmit to [facility] Verification Ordered    8  Gabapentin 300 MG Oral Capsule; TAKE 1 CAPSULE Bedtime; Therapy: 73DJE2543 to (Evaluate:29Apr2017)  Requested for: 18RKI1007; Last   Rx:87Yel4344 Ordered    9  Propranolol HCl - 20 MG Oral Tablet; Take 1 tablet twice daily; Therapy: 93FDZ1237 to (Evaluate:01Jun2017)  Requested for: 28QNP0672; Last   TP:44MFK2107 Ordered    10  Acetaminophen-Codeine #3 300-30 MG Oral Tablet; TAKE 1 TABLET 3 TIMES DAILY AS    NEEDED FOR PAIN;    Therapy: 78XBM9220 to (Evaluate:29Apr2017); Last Rx:13Odu8112 Ordered    11  VESIcare 10 MG Oral Tablet; Take 1 tablet daily; Therapy: 89DTO0331 to (Evaluate:22Jun2016); Last Rx:24Mar2016 Ordered    12  Diclofenac Potassium 50 MG Oral Tablet; TAKE 1 TABLET TWICE DAILY AFTER MEALS; Therapy: 30LYO2237 to ([phone])  Requested for: 55DNF3767; Last    Rx:09Jan2017 Ordered    13  RisperiDONE 2 MG Oral Tablet (RisperDAL); TAKE 1 TABLET Bedtime; Therapy: 14NYL1745 to (Evaluate:11Jun2017)  Requested for: 33ULT8266; Last    Rx:08Mar2017; Status: ACTIVE - Transmit to [facility] Verification Ordered    14  Primidone 250 MG Oral Tablet; TAKE 1 TABLET AT BEDTIME; Therapy: 92YIN3876 to (Evaluate:64Qvj3071)  Requested for: 73JTM4207; Last    Rx:01Aai4358 Ordered    15  Vitamin D3 1000 UNIT Oral Capsule; TAKE 1 CAPSULE DAILY; Therapy: 47DMO6667 to (Evaluate:07Rdi6469)  Requested for: 70Suu7130; Last    Rx:49Ltb0366 Ordered    Future Appointments    Date/Time Provider Specialty Site   05/18/2017 01:50 PM ASHLEY Flores   Internal Medicine [address] PCP   04/25/2017 11:00 AM Milda Romberg, CRNP Pain Management [facility]   03/14/2017 11:15 AM Rose Britt MS, OMAIRA, PMHCNS_BS  [facility]   03/31/2017 02:15 PM Maria L Blair MS, OMAIRA, PMHCNS_BS  [facility]   04/13/2017 02:15 PM Maria L Blair, MS, OMAIRA, PMHCNS_BS  [facility]     Signatures   Electronically signed by : ASHLEY Dan ; Mar  8 2017  2:11PM EST                       (Author)

## 2018-01-10 NOTE — MISCELLANEOUS
Message   Recorded as Task   Date: 07/11/2017 08:49 PM, Created By: Geo Belcher   Task Name: Call Patient with results   Assigned To: Janet Romo   Regarding Patient: Traci Vale, Status: Active   Comment:    Geo Belcher - 11 Jul 2017 8:49 PM     Patient Phone: [phone]      please notfiy the pt that MRI of her hip did not show any internal derangement, muscle or tendon injuries, or significant degenerative changes   Chandni Alex - 12 Jul 2017 9:23 AM     TASK EDITED  made pt aware of her mri results        Active Problems    1  Analgesic use (V58 69) (Z79 899)   2  Anxiety (300 00) (F41 9)   3  Arthralgia Of Shoulder Region (719 41)   4  Back pain (724 5) (M54 9)   5  Bereavement (V62 82) (Z63 4)   6  Bilateral hip pain (719 45) (M25 551,M25 552)   7  Bipolar II disorder (296 89) (F31 81)   8  Chronic bilateral low back pain with bilateral sciatica (724 2,724 3,338 29)   (M54 42,M54 41,G89 29)   9  Chronic pain of both knees (655 18,589 46) (M25 561,M25 562,G89 29)   10  Chronic pain syndrome (338 4) (G89 4)   11  Cognitive disorder (294 9) (F09)   12  Esophageal reflux (530 81) (K21 9)   13  Fatigue (780 79) (R53 83)   14  Female pelvic pain (625 9) (R10 2)   15  Fibromyalgia (729 1) (M79 7)   16  Generalized anxiety disorder (300 02) (F41 1)   17  Hypertension (401 9) (I10)   18  Hypokalemia (276 8) (E87 6)   19  Insomnia (780 52) (G47 00)   20  Lumbar spondylosis (721 3) (M47 816)   21  Major depressive disorder, recurrent episode, moderate degree (296 32) (F33 1)   22  Migraine (346 90) (G43 909)   23  History of Need for prophylactic vaccination and inoculation against influenza (V04 81)    (Z23)   24  Opioid dependence (304 00) (F11 20)   25  Osteoarthritis of both hips, unspecified osteoarthritis type (715 95) (M16 0)   26  Osteoarthritis of knee (715 36) (M17 10)   27  Overactive bladder (596 51) (N32 81)   28  Pain, joint, hip (719 45) (M25 559)   29   Panic disorder without agoraphobia (300 01) (F41 0)   30  Post traumatic stress disorder (309 81) (F43 10)   31  Primary localized osteoarthritis of both knees (715 16) (M17 0)   32  Recent unexplained weight loss (783 21) (R63 4)   33  Right knee pain (719 46) (M25 561)   34  Sacroiliitis (720 2) (M46 1)   35  Tremor (781 0) (R25 1)   36  Urinary incontinence (788 30) (R32)   37  Vitamin D deficiency (268 9) (E55 9)    Current Meds   1  Acetaminophen-Codeine #3 300-30 MG Oral Tablet; TAKE 1 TABLET TWICE DAILY AS   NEEDED FOR PAIN;   Therapy: 12XOI6289 to (Evaluate:04Pdt9918); Last Rx:04Kxi7437 Ordered   2  AmLODIPine Besylate 10 MG Oral Tablet; TAKE 1 TABLET DAILY; Therapy: 40OPK1920 to (Evaluate:78Buo2689)  Requested for: 62GKO7917; Last   Rx:29Oge5745 Ordered   3  Aspirin 81 MG Oral Tablet Delayed Release; take 1 tablet by mouth daily; Therapy: 07QXA2358 to (Evaluate:67Sph1918)  Requested for: 24SHJ2311; Last   Rx:63Zhs0697 Ordered   4  Diclofenac Potassium 50 MG Oral Tablet; TAKE 1 TABLET TWICE DAILY AFTER MEALS; Therapy: 40KZR5097 to ([phone])  Requested for: 13PKQ4496; Last   Rx:09Jan2017 Ordered   5  Gabapentin 400 MG Oral Capsule; TAKE 1 CAPSULE AT BEDTIME; Therapy: 92OFI3933 to (Evaluate:55Lgj4567)  Requested for: 59OXF0608; Last   Rx:14Jun2017 Ordered   6  LORazepam 2 MG Oral Tablet; TAKE 1 TABLET 3 TIMES DAILY; Therapy: 22YPL5072 to (Evaluate:39Qjk1908); Last Rx:24May2017 Ordered   7  Meloxicam 7 5 MG Oral Tablet; Take 1 tablet twice daily as needed; Therapy: 09DBP2653 to (Evaluate:89Ewc3358)  Requested for: 21Jun2017; Last   CI:68HPU4033 Ordered   8  Morphine Sulfate ER 15 MG Oral Tablet Extended Release; take 1 po in am;   Therapy: 48YYI3493 to (Evaluate:81Zrh1929); Last Rx:19Jun2017 Ordered   9  Omeprazole 20 MG Oral Capsule Delayed Release; TAKE 1 CAPSULE DAILY EVERY   MORNING BEFORE BREAKFAST; Therapy: 73CRD1956 to (Evaluate:07Rmt8276)  Requested for: 87PBQ6941; Last   Rx:18May2017 Ordered   10  Primidone 250 MG Oral Tablet; TAKE 1 TABLET AT BEDTIME; Therapy: 75GXL3888 to (Evaluate:87Jsb4203)  Requested for: 82BPA5193; Last    Rx:33Gkm3702 Ordered   11  Propranolol HCl - 20 MG Oral Tablet; Take 1 tablet twice daily; Therapy: 87OIM4316 to (Evaluate:59Wus5993)  Requested for: 98CDN4750; Last    Rx:82Hlj3975 Ordered   12  RisperiDONE 2 MG Oral Tablet (RisperDAL); TAKE 1 TABLET Bedtime; Therapy: 33XBJ3243 to (Evaluate:73Dho0726)  Requested for: 61IDR5190; Last    Rx:24May2017 Ordered   13  TiZANidine HCl - 2 MG Oral Tablet; Take 1 po tid prn spasms; Therapy: 26KHR6115 to (Evaluate:60Wfw7833)  Requested for: 35DSH7533; Last    Rx:10May2017 Ordered   14  TraZODone HCl - 150 MG Oral Tablet; TAKE 1 TABLET Once At Bedtime; Therapy: 86FJI1754 to (Evaluate:18Lfw8850)  Requested for: 85REW0827; Last    Rx:99Clm2593 Ordered   15  Venlafaxine HCl - 25 MG Oral Tablet; TAKE 1 TABLET 3 TIMES DAILY WITH FOOD; Therapy: 27JEY6278 to (Evaluate:52Muu9969)  Requested for: 56DMR3221; Last    Rx:63Whm0627 Ordered   16  VESIcare 10 MG Oral Tablet; Take 1 tablet daily; Therapy: 56FNL5420 to (João Torres)  Requested for: 64OCK1313; Last    Rx:83Byq5119 Ordered   17  Vitamin D3 1000 UNIT Oral Capsule; TAKE 1 CAPSULE DAILY; Therapy: 46NPV3446 to (Evaluate:06Hur7711)  Requested for: 75PJB9907; Last    Rx:28Tpz9799 Ordered    Allergies    1   No Known Drug Allergies    Signatures   Electronically signed by : Marietta Agee, ; Jul 12 2017  9:24AM EST                       (Author)

## 2018-01-10 NOTE — MISCELLANEOUS
Message   Recorded as Task   Date: 03/16/2017 09:09 AM, Created By: Doug Saucedo   Task Name: Call Back   Assigned To: [facility]   Regarding Patient: Blaze Friday, Status: Active   Comment:    Doug Saucedo - 16 Mar 2017 9:09 AM     TASK CREATED  Please give the patient a call [phone]  Patient called today stating when getting out of bed her legs felt weak, collapsed, and fell to the floor  Wanted to inform med staff and would like to be advised on what to do  Sameera Lin - 16 Mar 2017 11:23 AM     TASK EDITED  S/w pt  regarding above  Per pt  she never had this happen to her before and she felt as if she should report it  Per pt  she got up real "fast" this AM when she realized she forgot to take her pills  Per pt  she went to "jump" out of bed and she felt very weak and "flopped" to the floor  Per pt  at this time she is feeling "fine" without any weakness noted and no injury from fall  Per pt  she is not using any new medication besides an over the counter lidocaine patch to her lower back/rib area  Advised pt  that she should slowly get oob to allow her blood pressure to match her movements  Advised to go from lying to sitting and when knows she isn't dizzy or weak go from sitting to standing and then walking  Pt  verbalized understanding of same and restated that she was just nervous because she never felt this way before  Advised pt  that I would send this information to PILI who she is scheduled to see on 3/22 at 8:30 AT, at the [place] office  Advised would c/b with any suggestions or recommendations if DG had any, otherwise would see her at her OV appt  Pt  verbalized understanding of same and was appreciative  ***please advise thank Joann Richardson - 16 Mar 2017 1:39 PM     TASK REASSIGNED: Previously Assigned To Jenaro Fonseca  Can you take a look at this as I have never seen this patient  Thank you     Ysabel Clinton - 16 Mar 2017 1:56 PM     TASK REPLIED TO: Previously Assigned To [facility]                      Agree with recommendations of changing position slowly as the patient is on multiple medications that can cause orthostatic hypotension including blood pressure medications and gabapentin  Please verify that the patient did not lose consciousness  If the patient did lose consciousness she should go to the emergency room to be evaluated  If the patient did not lose consciousness she is to follow the above recommendations and if the symptoms continue to happen she should go to the emergency Department to be evaluated  Sameera Lin - 16 Mar 2017 2:49 PM     TASK EDITED  ***FYI***    S/w pt  regarding above, pt  denied losing consciousness and is aware that if happens again or if symptoms continue even with changing positions slowly that she should go to the ER  Pt  verbalized understanding of all information  Carrillo Wise - 16 Mar 2017 3:58 PM     TASK REPLIED TO: Previously Assigned To Carrillo Wise                      Aware        Active Problems    1  Analgesic use (V58 69) (Z79 899)   2  Anxiety (300 00) (F41 9)   3  Arthralgia Of Shoulder Region (719 41)   4  Back pain (724 5) (M54 9)   5  Bipolar II disorder (296 89) (F31 81)   6  Chronic pain of both knees (416 72,957 35) (M25 561,M25 562,G89 29)   7  Chronic pain syndrome (338 4) (G89 4)   8  Cognitive disorder (294 9) (F09)   9  Depression (311) (F32 9)   10  Esophageal reflux (530 81) (K21 9)   11  Fatigue (780 79) (R53 83)   12  Female pelvic pain (625 9) (R10 2)   13  Fibromyalgia (729 1) (M79 7)   14  Generalized anxiety disorder (300 02) (F41 1)   15  Hypertension (401 9) (I10)   16  Hypokalemia (276 8) (E87 6)   17  Insomnia (780 52) (G47 00)   18  Left knee pain (719 46) (M25 562)   19  Lumbar spondylosis (721 3) (M47 816)   20  Major depressive disorder, recurrent episode, moderate degree (296 32) (F33 1)   21  Migraine (346 90) (G43 909)   22  History of Need for prophylactic vaccination and inoculation against influenza (V04 81)    (Z23)   23  Opioid dependence (304 00) (F11 20)   24  Osteoarthritis of both hips, unspecified osteoarthritis type (715 95) (M16 0)   25  Osteoarthritis of knee (715 36) (M17 9)   26  Overactive bladder (596 51) (N32 81)   27  Pain, joint, hip (719 45) (M25 559)   28  Panic disorder without agoraphobia (300 01) (F41 0)   29  Post traumatic stress disorder (309 81) (F43 10)   30  Recent unexplained weight loss (783 21) (R63 4)   31  Right knee pain (719 46) (M25 561)   32  Sacroiliitis (720 2) (M46 1)   33  Tremor (781 0) (R25 1)   34  Urinary incontinence (788 30) (R32)   35  Vitamin D deficiency (268 9) (E55 9)    Current Meds   1  Acetaminophen-Codeine #3 300-30 MG Oral Tablet; TAKE 1 TABLET 3 TIMES DAILY AS   NEEDED FOR PAIN;   Therapy: 91LYU9056 to (Evaluate:29Apr2017); Last Rx:16Uky9277 Ordered   2  AmLODIPine Besylate 10 MG Oral Tablet; TAKE 1 TABLET DAILY; Therapy: 51LYH6216 to (Evaluate:01Jun2017)  Requested for: 70HGX5127; Last   Rx:06Jun2016 Ordered   3  Aspirin 81 MG Oral Tablet Delayed Release; take 1 tablet by mouth daily; Therapy: 48XFS2547 to (Evaluate:09Mbn9632)  Requested for: 70CPQ6286; Last   Rx:17Pdm4284 Ordered   4  Baclofen 10 MG Oral Tablet; Take 1 tablet twice a day when necessary muscle spasm; Therapy: 43Ocz9271 to (Evaluate:57Seu2700)  Requested for: 04NVV5128; Last   Rx:13Mar2017 Ordered   5  Diclofenac Potassium 50 MG Oral Tablet; TAKE 1 TABLET TWICE DAILY AFTER MEALS; Therapy: 61MAE1427 to (77 873 135)  Requested for: 67JEX6256; Last   Rx:09Jan2017 Ordered   6  Gabapentin 300 MG Oral Capsule; TAKE 1 CAPSULE Bedtime; Therapy: 66TVB3638 to (Evaluate:29Apr2017)  Requested for: 83JCW6723; Last   Rx:28Feb2017 Ordered   7  LORazepam 2 MG Oral Tablet; TAKE 1 TABLET 3 TIMES DAILY; Therapy: 24YOE3266 to (Evaluate:11Jun2017); Last Rx:08Mar2017 Ordered   8   Omeprazole 20 MG Oral Capsule Delayed Release; TAKE 1 CAPSULE DAILY EVERY   MORNING BEFORE BREAKFAST; Therapy: 97XRB9061 to (Evaluate:18Feb2017)  Requested for: 06Jua1789; Last   Rx:37Uxy2837 Ordered   9  Primidone 250 MG Oral Tablet; TAKE 1 TABLET AT BEDTIME; Therapy: 40GXQ9008 to (Evaluate:70Bgb0177)  Requested for: 87EEJ5030; Last   Rx:96Fwg4834 Ordered   10  Propranolol HCl - 20 MG Oral Tablet; Take 1 tablet twice daily; Therapy: 10OQL9183 to (Evaluate:01Jun2017)  Requested for: 70DYN9094; Last    ZD:44MGO1193 Ordered   11  RisperiDONE 2 MG Oral Tablet (RisperDAL); TAKE 1 TABLET Bedtime; Therapy: 88CZR3118 to (Evaluate:11Jun2017)  Requested for: 83KIU0678; Last    Rx:08Mar2017 Ordered   12  TraZODone HCl - 150 MG Oral Tablet; TAKE 1 TABLET Once At Bedtime; Therapy: 34ENO1212 to (Evaluate:16Jun2017)  Requested for: 06UJL9039; Last    Rx:08Mar2017 Ordered   13  Venlafaxine HCl - 25 MG Oral Tablet; TAKE 1 TABLET 3 TIMES DAILY WITH FOOD; Therapy: 81BJB9814 to (Evaluate:11Jun2017)  Requested for: 62AJL6895; Last    Rx:08Mar2017 Ordered   14  VESIcare 10 MG Oral Tablet; Take 1 tablet daily; Therapy: 38WOS4170 to (Evaluate:22Jun2016); Last Rx:24Mar2016 Ordered   15  Vitamin D3 1000 UNIT Oral Capsule; TAKE 1 CAPSULE DAILY; Therapy: 31QLS1035 to (Evaluate:70Gca6542)  Requested for: 82WHQ7255; Last    Rx:18Zmw4802 Ordered    Allergies    1   No Known Drug Allergies    Signatures   Electronically signed by : Víctor Blank RN; Mar 16 2017  4:12PM EST                       (Author)

## 2018-01-10 NOTE — RESULT NOTES
Message   Recorded as Task   Date: 03/08/2017 10:11 AM, Created By: Virginia Milton   Task Name: Follow Up   Assigned To: [facility]   Regarding Patient: Walter Vitale, Status: Active   CommentRichie Contreras - 08 Mar 2017 10:11 AM     TASK CREATED  S/P B/L SIJ INJ 3/1/17 WITH DR Lisandra Sousa  POVS SCHEDULED WITH AO ON 4/25/17   Estephanie Lance - 08 Mar 2017 10:46 AM     TASK EDITED  MSG LEFT C# FOR PT TO CB   Joseline Mathis - 09 Mar 2017 10:01 AM     TASK EDITED  Pt returned call  Pls call pt at [phone]  Pt is still in a lot of pain  Lonmikey Dominick - 09 Mar 5917 8:78 PM     TASK EDITED  Pt reports that she is still having a lot of pain  Having trouble with ADL's her pain is in her back and front of legs and hips  Patient taking meds with minimal relief  Any recommendations prior to her OV? Yudy Payan - 09 Mar 2017 4:29 PM     TASK REPLIED TO: Previously Assigned To Vikash Kendall                      No recommendations at this time  We'll reevaluate at next follow-up   Lonmikey Dominick - 10 Mar 6049 1:16 AM     TASK EDITED  Patient aware          Signatures   Electronically signed by : Pedro Ibarra, ; Mar 10 2017  7:53AM EST                       (Author)

## 2018-01-10 NOTE — PSYCH
Psych Med Mgmt    Appearance: was calm and cooperative, adequate hygiene and grooming and good eye contact  Observed mood: depressed and anxious  Observed mood: affect was constricted  Speech: a normal rate and fluent  Thought processes: coherent/organized  Hallucinations: no hallucinations present  Thought Content: no delusions  Abnormal Thoughts: The patient has no suicidal thoughts and no homicidal thoughts  Orientation: The patient is oriented to person, place and time, oriented to person, oriented to place and oriented to time  Recent and Remote Memory: short term memory intact and long term memory intact  Attention Span And Concentration: concentration impaired  Insight: Limited insight  Judgment: Her judgment was limited  Muscle Strength And Tone  Muscle strength and tone were normal    The patient is experiencing moderate to severe pain  Arthritis pain  Goals addressed in session: Medication Management     Treatment Recommendations: Continue current treatment  Risks, Benefits And Possible Side Effects Of Medications: Risks, benefits, and possible side effects of medications explained to patient and patient verbalizes understanding  She reports normal appetite, normal energy level, no weight change and normal number of sleep hours  Stated she has been stressed out since she found out that her brother has Stage 4 colon CA  She stated she has been worried about her own health and she saw PCP and requested colonoscopy  PCP told her that she had one done in 2014  She stated she continues to be forgetful and she has difficulties processing information  She also c/o having hand tremors  She reported depressed mood "but is because I don't know what is going on with me and my memory"  Assessment    1  Generalized anxiety disorder (300 02) (F41 1)   2  Bipolar II disorder (296 89) (F31 81)    Plan    1  LORazepam 2 MG Oral Tablet; TAKE 1 TABLET 3 TIMES DAILY   2  Venlafaxine HCl - 25 MG Oral Tablet; TAKE 1 TABLET 3 TIMES DAILY WITH FOOD    3  TraZODone HCl - 150 MG Oral Tablet; TAKE 1 TABLET Once At Bedtime    4  RisperiDONE 2 MG Oral Tablet (RisperDAL); TAKE 1 TABLET Bedtime    Review of Systems    Constitutional: as noted in HPI  Substance Abuse Hx    Substance Abuse History: Denies  Active Problems    1  Anxiety (300 00) (F41 9)   2  Arthralgia Of Shoulder Region (719 41)   3  Back pain (724 5) (M54 9)   4  Bipolar II disorder (296 89) (F31 81)   5  Chronic pain of both knees (970 03,522 02) (M25 561,M25 562,G89 29)   6  Cognitive disorder (294 9) (F09)   7  Depression (311) (F32 9)   8  Esophageal reflux (530 81) (K21 9)   9  Fatigue (780 79) (R53 83)   10  Female pelvic pain (625 9) (R10 2)   11  Fibromyalgia (729 1) (M79 7)   12  Generalized anxiety disorder (300 02) (F41 1)   13  Hypertension (401 9) (I10)   14  Hypokalemia (276 8) (E87 6)   15  Insomnia (780 52) (G47 00)   16  Left knee pain (719 46) (M25 562)   17  Lumbar spondylosis (721 3) (M47 816)   18  Major depressive disorder, recurrent episode, moderate degree (296 32) (F33 1)   19  Migraine (346 90) (G43 909)   20  History of Need for prophylactic vaccination and inoculation against influenza (V04 81)    (Z23)   21  Osteoarthritis of both hips, unspecified osteoarthritis type (715 95) (M16 0)   22  Osteoarthritis of knee (715 36) (M17 9)   23  Overactive bladder (596 51) (N32 81)   24  Pain, joint, hip (719 45) (M25 559)   25  Panic disorder without agoraphobia (300 01) (F41 0)   26  Post traumatic stress disorder (309 81) (F43 10)   27  Recent unexplained weight loss (783 21) (R63 4)   28  Right knee pain (719 46) (M25 561)   29  Tremor (781 0) (R25 1)   30  Urinary incontinence (788 30) (R32)   31  Vitamin D deficiency (268 9) (E55 9)    Past Medical History    1  History of Acute nonsuppurative otitis media, unspecified laterality (381 00) (H65 199)   2   History of Bipolar disorder (296 80) (F31 9)   3  Depression (311) (F32 9)   4  History of Encounter for screening colonoscopy (V76 51) (Z12 11)   5  Fibromyalgia (729 1) (M79 7)   6  History of H/O colonoscopy (V45 89) (Z98 890)   7  History of nausea and vomiting (V12 79) (Z87 898)   8  History of stroke (V12 54) (Z86 73)   9  History of urinary tract infection (V13 02) (Z87 440)   10  History of Memory loss (780 93) (R41 3)   11  History of Mixed Anxiety Disorder (300 00)   12  History of Need for hepatitis C screening test (V73 89) (Z11 59)   13  History of Need for prophylactic vaccination and inoculation against influenza (V04 81)    (Z23)   14  History of Need for Tdap vaccination (V06 1) (Z23)   15  Personal history of arthritis (V13 4) (Z87 39)   16  History of Previous Spontaneous Vaginal Delivery   17  History of Screening for lipoid disorders (V77 91) (Z13 220)   18  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)   19  History of Visit for screening mammogram (V76 12) (Z12 31)    The active problems and past medical history were reviewed and updated today  Allergies    1  No Known Drug Allergies    Current Meds   1  AmLODIPine Besylate 10 MG Oral Tablet; TAKE 1 TABLET DAILY; Therapy: 00SPK1042 to (Evaluate:01Jun2017)  Requested for: 29BXQ8547; Last   Rx:06Jun2016 Ordered   2  Aspirin 81 MG TABS; TAKE 1 TABLET DAILY; Therapy: 00AGM7029 to (Evaluate:18Feb2017)  Requested for: 32Pfw0844; Last   Rx:82Yqh8550 Ordered   3  Baclofen 10 MG Oral Tablet; TAKE 1 TABLET Bedtime; Therapy: 11Tin2426 to (Evaluate:64Oih8728)  Requested for: 24Oct2016; Last   Rx:24Oct2016 Ordered   4  Diclofenac Potassium 50 MG Oral Tablet; TAKE 1 TABLET TWICE DAILY AFTER MEALS; Therapy: 46ZMR1919 to (Kayla Ortiz)  Requested for: 31Oct2016; Last   Rx:31Oct2016 Ordered   5  Gabapentin 300 MG Oral Capsule; TAKE 1 CAPSULE Bedtime; Therapy: 90MWM5470 to (Evaluate:05Jan2017)  Requested for: 87GBO3769; Last   Rx:60Xkg7849 Ordered   6   LORazepam 2 MG Oral Tablet; TAKE 1 TABLET 3 TIMES DAILY; Therapy: 73KCN2948 to (Evaluate:20Nov2016); Last Rx:21Ovs9270 Ordered   7  Omeprazole 20 MG Oral Capsule Delayed Release; TAKE 1 CAPSULE DAILY EVERY   MORNING BEFORE BREAKFAST; Therapy: 04AYH1912 to (Evaluate:06Dif5216)  Requested for: 20Kjy1165; Last   Rx:80Xlt7496 Ordered   8  Primidone 250 MG Oral Tablet; TAKE 1 TABLET AT BEDTIME; Therapy: 18AIJ7188 to (Evaluate:31Phw6695)  Requested for: 68Tys7862; Last   Rx:43Mhz4404 Ordered   9  Propranolol HCl - 20 MG Oral Tablet; Take 1 tablet twice daily; Therapy: 35PZR1911 to (Evaluate:01Jun2017)  Requested for: 06QMV1014; Last   YB:26DAN3032 Ordered   10  RisperiDONE 2 MG Oral Tablet; TAKE 1 TABLET Bedtime; Therapy: 85TBF1870 to (Evaluate:24Btg1741)  Requested for: 69Hky9833; Last    Rx:05Nao5309 Ordered   11  TraMADol HCl - 50 MG Oral Tablet; TAKE 1 TABLET 3 TIMES DAILY AS NEEDED; Therapy: 17PBJ0152 to (Evaluate:94Mjo4203); Last Rx:90Npk1856 Ordered   12  TraZODone HCl - 150 MG Oral Tablet; TAKE 1 TABLET Once At Bedtime; Therapy: 93ICK5793 to (Evaluate:75Amb7854)  Requested for: 13Gml0316; Last    Rx:12Xsc4253 Ordered   13  Venlafaxine HCl - 25 MG Oral Tablet; TAKE 1 TABLET 3 TIMES DAILY WITH FOOD; Therapy: 59CCS1080 to (Evaluate:31Mlx2777)  Requested for: 15Avx0447; Last    Rx:28Dgo5670 Ordered   14  VESIcare 10 MG Oral Tablet; Take 1 tablet daily; Therapy: 63AUH2406 to (Evaluate:01Uaj4618)  Requested for: 89WMR9916; Last    Rx:92Trh4283 Ordered   15  VESIcare 10 MG Oral Tablet; Take 1 tablet daily; Therapy: 65VXN9239 to (Evaluate:22Jun2016); Last Rx:12Fhs7738 Ordered   16  Vitamin D3 1000 UNIT Oral Capsule; TAKE 1 CAPSULE DAILY; Therapy: 88VSW8512 to (Evaluate:69Xqn5940)  Requested for: 29Gic8057; Last    Rx:31Jbx3349 Ordered    The medication list was reviewed and updated today  Family Psych History  Mother    1  Family history of Colon Cancer (V16 0)  Father    2   Family history of Alzheimer's disease (V17 2) (Z82 0)   3  Family history of cerebrovascular accident (V17 1) (Z82 3)  Son    4  Family history of seizures (V19 8) (Z84 89)  Brother    5  Family history of bipolar disorder (V17 0) (Z81 8)  Maternal Aunt    6  Family history of Breast Cancer (V16 3)  Unknown    7  Family history of cardiac disorder (V17 49) (Z82 49)   8  Family history of diabetes mellitus (V18 0) (Z83 3)   9  Family history of hypertension (V17 49) (Z82 49)    The family history was reviewed and updated today  Social History    · Being A Social Drinker   · Daily caffeine consumption, 6-8 servings a day   ·    · Education Level: Graduate school   · Lives in [place]   · Never A Smoker   · No drug use  The social history was reviewed and updated today  see HPI      End of Encounter Meds    1  Baclofen 10 MG Oral Tablet; TAKE 1 TABLET Bedtime; Therapy: 08Lqp8461 to (Evaluate:60Ohd5493)  Requested for: 47Wqo6196; Last   Rx:36Hae8931 Ordered   2  TraMADol HCl - 50 MG Oral Tablet; TAKE 1 TABLET 3 TIMES DAILY AS NEEDED; Therapy: 53GIQ3165 to (Evaluate:50Wuf4463); Last Rx:09Lfi9491 Ordered    3  Omeprazole 20 MG Oral Capsule Delayed Release; TAKE 1 CAPSULE DAILY EVERY   MORNING BEFORE BREAKFAST; Therapy: 58DIX0166 to (Evaluate:17Umx4197)  Requested for: 22Zgq8905; Last   Rx:64Ugy1603 Ordered    4  LORazepam 2 MG Oral Tablet; TAKE 1 TABLET 3 TIMES DAILY; Therapy: 83WJG2851 to (Evaluate:13Mar2017); Last Rx:07Ysd6660 Ordered   5  Venlafaxine HCl - 25 MG Oral Tablet; TAKE 1 TABLET 3 TIMES DAILY WITH FOOD; Therapy: 38ZDR7669 to (Evaluate:13Mar2017)  Requested for: 77Vvq5005; Last   Rx:21Kmv2040; Status: ACTIVE - Transmit to Pharmacy - Awaiting Verification Ordered    6  AmLODIPine Besylate 10 MG Oral Tablet; TAKE 1 TABLET DAILY; Therapy: 27IPJ8013 to (Evaluate:01Jun2017)  Requested for: 54JGS1100; Last   VJ:22VUJ4529 Ordered    7   TraZODone HCl - 150 MG Oral Tablet; TAKE 1 TABLET Once At Bedtime; Therapy: 51MZL7485 to (Evaluate:13Mar2017)  Requested for: 30Nbv1385; Last   Rx:92Lhn0459 Ordered    8  Gabapentin 300 MG Oral Capsule; TAKE 1 CAPSULE Bedtime; Therapy: 45OHL1673 to (Evaluate:05Jan2017)  Requested for: 13MRH6518; Last   Rx:80Xdp2607 Ordered    9  Propranolol HCl - 20 MG Oral Tablet; Take 1 tablet twice daily; Therapy: 99JTF0118 to (Evaluate:01Jun2017)  Requested for: 95YGJ7353; Last   RU:04EFP2752 Ordered    10  VESIcare 10 MG Oral Tablet; Take 1 tablet daily; Therapy: 89GIZ8184 to (Evaluate:04Sep2017)  Requested for: 03YLA8605; Last    Rx:85Pru4696 Ordered    11  VESIcare 10 MG Oral Tablet; Take 1 tablet daily; Therapy: 41KUN0399 to (Evaluate:22Jun2016); Last Rx:24Mar2016 Ordered    12  Diclofenac Potassium 50 MG Oral Tablet; TAKE 1 TABLET TWICE DAILY AFTER MEALS; Therapy: 51EHV8215 to (Rosa King)  Requested for: 31Oct2016; Last    Rx:88Sra1849 Ordered    13  RisperiDONE 2 MG Oral Tablet (RisperDAL); TAKE 1 TABLET Bedtime; Therapy: 44CDG3551 to (Evaluate:13Mar2017)  Requested for: 87Bfn9790; Last    Rx:72Wmj8965; Status: ACTIVE - Transmit to [facility] Ordered    14  Aspirin 81 MG TABS; TAKE 1 TABLET DAILY; Therapy: 67AYM3326 to (Evaluate:18Feb2017)  Requested for: 13Vwb1761; Last    Rx:99Cli7418 Ordered    15  Primidone 250 MG Oral Tablet; TAKE 1 TABLET AT BEDTIME; Therapy: 34CDB8866 to (Evaluate:56Ktr1432)  Requested for: 24Oct2016; Last    Rx:12Oqn6061 Ordered    16  Vitamin D3 1000 UNIT Oral Capsule; TAKE 1 CAPSULE DAILY; Therapy: 74QRQ4572 to (Evaluate:18Feb2017)  Requested for: 89Gci8837; Last    Rx:84Lfw0291 Ordered    Future Appointments    Date/Time Provider Specialty Site   05/18/2017 01:50 PM ASHLEY Singer   Internal Medicine [address] [place] [address] PCP   12[facility]19[facility]2016 12[facility]20 [facility]   12/14/2016 02:15 PM Srinivasa Blair MS, APRN, PMHCNS_BS  [facility]   12/30/2016 01:15 PM Madeline Blair, MS, APRN, PMHCNS_BS  [facility]   01/10/2017 01:15 PM Isai Dean, MS, APRN, PMHCNS_BS  [facility]   01/09/2017 11:30 AM Maurice Huggins, DO Pain Management [address]     Signatures   Electronically signed by : ASHLEY Lezama ; Dec 13 2016  2:00PM EST                       (Author)

## 2018-01-11 ENCOUNTER — GENERIC CONVERSION - ENCOUNTER (OUTPATIENT)
Dept: OTHER | Facility: OTHER | Age: 53
End: 2018-01-11

## 2018-01-11 NOTE — MISCELLANEOUS
Message   Recorded as Task   Date: 06/30/2017 04:28 PM, Created By: Ana Quick   Task Name: Call Back   Assigned To: [facility],Team   Regarding Patient: Mike William, Status: Active   Comment:    Ana Quick - 30 Jun 2017 4:28 PM     TASK CREATED  [address] 64 she has been waiting for someone to call her back about her MRI  Wants to know if she can go ahead and schedule it  Frieda Malave Stewart Stated her MRI was approved by her insurance   c/b   Julienne Paris - 30 Jun 2017 4:30 PM     TASK EDITED  [address] [place] [address]  [phone]   [facility] - 03 Jul 2017 7:59 AM     TASK EDITED  spoke with pt on the phone, stated to the pt that she could scheudle  her appt for her mri , the insurance approved the mri  Active Problems    1  Analgesic use (V58 69) (Z79 899)   2  Anxiety (300 00) (F41 9)   3  Arthralgia Of Shoulder Region (719 41)   4  Back pain (724 5) (M54 9)   5  Bereavement (V62 82) (Z63 4)   6  Bilateral hip pain (719 45) (M25 551,M25 552)   7  Bipolar II disorder (296 89) (F31 81)   8  Chronic bilateral low back pain with bilateral sciatica (724 2,724 3,338 29)   (M54 42,M54 41,G89 29)   9  Chronic pain of both knees (473 35,329 67) (M25 561,M25 562,G89 29)   10  Chronic pain syndrome (338 4) (G89 4)   11  Cognitive disorder (294 9) (F09)   12  Esophageal reflux (530 81) (K21 9)   13  Fatigue (780 79) (R53 83)   14  Female pelvic pain (625 9) (R10 2)   15  Fibromyalgia (729 1) (M79 7)   16  Generalized anxiety disorder (300 02) (F41 1)   17  Hypertension (401 9) (I10)   18  Hypokalemia (276 8) (E87 6)   19  Insomnia (780 52) (G47 00)   20  Lumbar spondylosis (721 3) (M47 816)   21  Major depressive disorder, recurrent episode, moderate degree (296 32) (F33 1)   22  Migraine (346 90) (G43 909)   23  History of Need for prophylactic vaccination and inoculation against influenza (V04 81)    (Z23)   24  Opioid dependence (304 00) (F11 20)   25   Osteoarthritis of both hips, unspecified osteoarthritis type (715 95) (M16 0)   26  Osteoarthritis of knee (715 36) (M17 10)   27  Overactive bladder (596 51) (N32 81)   28  Pain, joint, hip (719 45) (M25 559)   29  Panic disorder without agoraphobia (300 01) (F41 0)   30  Post traumatic stress disorder (309 81) (F43 10)   31  Primary localized osteoarthritis of both knees (715 16) (M17 0)   32  Recent unexplained weight loss (783 21) (R63 4)   33  Right knee pain (719 46) (M25 561)   34  Sacroiliitis (720 2) (M46 1)   35  Tremor (781 0) (R25 1)   36  Urinary incontinence (788 30) (R32)   37  Vitamin D deficiency (268 9) (E55 9)    Current Meds   1  Acetaminophen-Codeine #3 300-30 MG Oral Tablet; TAKE 1 TABLET TWICE DAILY AS   NEEDED FOR PAIN;   Therapy: 82EJM3694 to (Evaluate:78Mwm6595); Last Rx:10May2017 Ordered   2  AmLODIPine Besylate 10 MG Oral Tablet; TAKE 1 TABLET DAILY; Therapy: 96HHT8340 to (Evaluate:90Gsm6386)  Requested for: 99YYL6950; Last   Rx:18May2017 Ordered   3  Aspirin 81 MG Oral Tablet Delayed Release; take 1 tablet by mouth daily; Therapy: 32KUB0344 to (Evaluate:81Sak7454)  Requested for: 29FZD1966; Last   Rx:87Lmr3163 Ordered   4  Diclofenac Potassium 50 MG Oral Tablet; TAKE 1 TABLET TWICE DAILY AFTER MEALS; Therapy: 33IAB9868 to (03 17 74 30 53)  Requested for: 75QIO7557; Last   Rx:09Jan2017 Ordered   5  Gabapentin 400 MG Oral Capsule; TAKE 1 CAPSULE AT BEDTIME; Therapy: 46NVU4799 to (Evaluate:96Ajt8812)  Requested for: 91ZOT7823; Last   Rx:14Jun2017 Ordered   6  LORazepam 2 MG Oral Tablet; TAKE 1 TABLET 3 TIMES DAILY; Therapy: 63IBD3545 to (Evaluate:58Jsb9248); Last Rx:24May2017 Ordered   7  Meloxicam 7 5 MG Oral Tablet; Take 1 tablet twice daily as needed; Therapy: 58YBQ8781 to (Evaluate:93Gyx0029)  Requested for: 21Jun2017; Last   YX:31KLD2936 Ordered   8  Morphine Sulfate ER 15 MG Oral Tablet Extended Release; take 1 po in am;   Therapy: 08NIV9417 to (Evaluate:15Aug2017); Last Rx:19Jun2017 Ordered   9   Omeprazole 20 MG Oral Capsule Delayed Release; TAKE 1 CAPSULE DAILY EVERY   MORNING BEFORE BREAKFAST; Therapy: 92JIX3493 to (Evaluate:13May2018)  Requested for: 39TTU5293; Last   Rx:18May2017 Ordered   10  Primidone 250 MG Oral Tablet; TAKE 1 TABLET AT BEDTIME; Therapy: 17YLZ1018 to (Evaluate:74Skq4748)  Requested for: 93SBF2833; Last    Rx:96Otr5235 Ordered   11  Propranolol HCl - 20 MG Oral Tablet; Take 1 tablet twice daily; Therapy: 99HJE7516 to (Evaluate:13May2018)  Requested for: 89XKD8426; Last    Rx:18May2017 Ordered   12  RisperiDONE 2 MG Oral Tablet (RisperDAL); TAKE 1 TABLET Bedtime; Therapy: 96UIU6959 to (Evaluate:21Fyi9467)  Requested for: 27EYS1886; Last    Rx:24May2017 Ordered   13  TiZANidine HCl - 2 MG Oral Tablet; Take 1 po tid prn spasms; Therapy: 86IQC1463 to (Evaluate:67Emu4926)  Requested for: 65IHF0361; Last    Rx:10May2017 Ordered   14  TraZODone HCl - 150 MG Oral Tablet; TAKE 1 TABLET Once At Bedtime; Therapy: 62IXD5542 to (Evaluate:04Diw8190)  Requested for: 11XGN5773; Last    Rx:24May2017 Ordered   15  Venlafaxine HCl - 25 MG Oral Tablet; TAKE 1 TABLET 3 TIMES DAILY WITH FOOD; Therapy: 05XPK8986 to (Evaluate:17Rcf3491)  Requested for: 37JRP0003; Last    Rx:97Aed6770 Ordered   16  VESIcare 10 MG Oral Tablet; Take 1 tablet daily; Therapy: 93XKZ7124 to (Kenton Small)  Requested for: 90NVJ5559; Last    Rx:83Hrh3383 Ordered   17  Vitamin D3 1000 UNIT Oral Capsule; TAKE 1 CAPSULE DAILY; Therapy: 73RQT6690 to (Evaluate:13May2018)  Requested for: 77WEH6239; Last    Rx:83Aaa4581 Ordered    Allergies    1   No Known Drug Allergies    Signatures   Electronically signed by : Marietta Agee, ; Jul  3 2017  7:59AM EST                       (Author)

## 2018-01-11 NOTE — PSYCH
Progress Note  Psychotherapy Provided [facility]: Individual Psychotherapy 45 minutes provided today  Goals addressed in session:   Goal #1   D: " I feel like I'll never really be the same, since I had a stroke in 2009---[address] [place] [address] nobody seems to get the records from there,and my MRI's show I have had NO stroke , up here "   " My Memory is bad, I can't remember words, or dates, or names of people or things "   " I get discouraged, hopeless some days, that I'll never have a life again, never get ->-the guys I was talking to,and the one I used to visit in the [facility], out of my life "  Kimper Organ  "I have Pain in my right hip, a 9 today---I am going to Pain Management at [place] [address] on October 31 '   " My only positive is that I do help my daughter , who is very, very busy as an , I help her at events, like watching the equipment so nobody steals it, or helping with the clothing of the [address] [place], when my daughter does big Fashion Shows"    Pt has a depressed, anxious affect  Has many automatic -negative thoughts,and has bouts of hopelessness some days  Pt denies current SI/HI / Mariya Joseph / VH  Pt doesn't feel her life will ever be the same, since an alleged stroke she suffered in [place], in 2009  Pt has memory lapses,and word-finding delays  Pt processes her thoughts, feelings,and behaviors  listening, support,and cognitive-reframing to more realistic, and some positive, thoughts, are done in session today  Pt is given Psychoeducation,also  Pt to continue meds  as prescribed by psychiatrist and other phsyicians, and pt to do one goal each day,and give self-affirmation each day  Pt has a  named "Donald Nixon" who transported her to [employer] today, for which she is thankful  Pt was referred by this Therapist to [facility] , and to [employer], but pt states those people are not for her  Additional Social Support is still recommended---we will continue exploring this  Pt does not qualify for Bipolar Group, as she apparently is not truly Bipolar (but a past Provider had earlier told her she probably was   ) We do Calm, deep-breathing exercises, at end of session,and pt to do at [facility]  Pt to continue helping her daughter at special events,and pt states she is going to [place] December 8, for a short while, to help daughter with event---pt's children's father is going to drive pt there  Pt to be thankful for this,and try not to think of all-negatives, in future weeks  A: Major Depressive Disorder, recurrent, F33 1; Generalized Anxiety Disorder, F41 10; Cognitive Disorder F09 0  P: Continue Treatment Plan, Meds  , and supportive Psychotherapy  Pain Scale and Suicide Risk [facility]: Current Pain Assessment: moderate to severe   On a scale of 0 to 10, the patient rates current pain at 9   Current suicide risk is low   Behavioral Health Treatment Plan [facility]: Diagnosis and Treatment Plan explained to patient, patient relates understanding diagnosis and is agreeable to Treatment Plan  Assessment    1  Major depressive disorder, recurrent episode, moderate degree (296 32) (F33 1)   2   Generalized anxiety disorder (300 02) (F41 1)    Signatures   Electronically signed by : BON Lambert-BC; Oct 26 2016  8:51PM EST                       (Author)    Electronically signed by : VAZQUEZ Lambert; Oct 26 2016  8:51PM EST                       (Author)

## 2018-01-11 NOTE — RESULT NOTES
Verified Results  * MRI HIP RIGHT WO CONTRAST [id number] 01:14PM [facility]     Test Name Result Flag Reference   MRI HIP RIGHT WO CONTRAST (Report)     1  MRI RIGHT HIP   2  MRI LEFT HIP     INDICATION: M16 0: Bilateral primary osteoarthritis of hip  History taken directly from the electronic ordering system  COMPARISON: Hip radiographs dated 9/6/2016  TECHNIQUE:    1  MR sequences were obtained of the right hip and pelvis including: Localizer, axial T2 fat sat, coronal T1/STIR, cone-down axial oblique PD of the right hip, coronal PD of the right hip, sagittal T2 fat sat of the right hip  Images were acquired on a    1 5 [place] unit  Gadolinium was not used  2  Cone-down axial oblique PD of the left hip, coronal PD of the left hip, sagittal T2 fat sat of the left hip  Images were acquired on a 1 5 [place] unit  Gadolinium was not used  FINDINGS:     RIGHT HIP:     -JOINT EFFUSION: None  -BONE MARROW SIGNAL AND ALIGNMENT: Normal marrow signal demonstrated without hip fracture or AVN  -ACETABULAR LABRUM: Intact  -TROCHANTERIC BURSA: Normal      LEFT HIP:      -JOINT EFFUSION: None  -BONE MARROW SIGNAL AND ALIGNMENT: Normal marrow signal demonstrated without hip fracture or AVN  Faint subchondral edema in the anterior acetabular roof  However, there is no visible full-thickness cartilage fissuring associated with this finding  Furthermore, no osteophytes or subchondral cysts  -ACETABULAR LABRUM: No gross abnormalities although evaluation is very limited  -TROCHANTERIC BURSA: Normal      PELVIS:     -BONE MARROW SIGNAL: No fracture, osteonecrosis, or pathologic marrow infiltration  -SI JOINTS AND SYMPHYSIS PUBIS: Minimal degenerative spurring at the pubic symphysis  -VISUALIZED LUMBAR SPINE: Lumbar levoscoliosis with multilevel disc as indication and edematous endplate changes  Impact on foramina and spinal canal are not well assessed       -MUSCULATURE: Intact with intact hamstring origins bilaterally  -PELVIC SOFT TISSUES: Prior hysterectomy  Ovaries not well visualized  No adnexal masses  Trace free pelvic fluid  Urinary bladder and visualized bowel are grossly unremarkable  SUBCUTANEOUS TISSUES: Normal no inguinal adenopathy  IMPRESSION:   No acute findings, internal derangement, myotendinous injuries, or significant degenerative changes at either hip         Workstation performed: [id number]     Signed by:   Ambrosio Garcia MD   7/11/17

## 2018-01-12 ENCOUNTER — GENERIC CONVERSION - ENCOUNTER (OUTPATIENT)
Dept: OTHER | Facility: OTHER | Age: 53
End: 2018-01-12

## 2018-01-12 VITALS
WEIGHT: 139.06 LBS | TEMPERATURE: 98.6 F | HEIGHT: 61 IN | BODY MASS INDEX: 26.26 KG/M2 | SYSTOLIC BLOOD PRESSURE: 125 MMHG | HEART RATE: 85 BPM | DIASTOLIC BLOOD PRESSURE: 90 MMHG

## 2018-01-12 VITALS
DIASTOLIC BLOOD PRESSURE: 87 MMHG | WEIGHT: 144 LBS | TEMPERATURE: 98.5 F | HEIGHT: 61 IN | HEART RATE: 68 BPM | BODY MASS INDEX: 27.19 KG/M2 | SYSTOLIC BLOOD PRESSURE: 122 MMHG

## 2018-01-12 NOTE — MISCELLANEOUS
Message   Recorded as Task   Date: 07/11/2017 08:55 PM, Created By: Aure Prasad   Task Name: Call Patient with results   Assigned To: Tootie Lynn   Regarding Patient: Elyssa Clifton, Status: Active   Comment:    Aure Prasad - 11 Jul 2017 8:55 PM     Patient Phone: [phone]      please notify the pt the MRI of her right hip does not show any significant degenerative changes, tears of tendons or muscle, or internal derangement   Chandni Alex - 12 Jul 2017 8:47 AM     TASK EDITED  spoke with pt made her aware of results of the MRI of the right hip        Active Problems    1  Analgesic use (V58 69) (Z79 899)   2  Anxiety (300 00) (F41 9)   3  Arthralgia Of Shoulder Region (719 41)   4  Back pain (724 5) (M54 9)   5  Bereavement (V62 82) (Z63 4)   6  Bilateral hip pain (719 45) (M25 551,M25 552)   7  Bipolar II disorder (296 89) (F31 81)   8  Chronic bilateral low back pain with bilateral sciatica (724 2,724 3,338 29)   (M54 42,M54 41,G89 29)   9  Chronic pain of both knees (603 65,692 90) (M25 561,M25 562,G89 29)   10  Chronic pain syndrome (338 4) (G89 4)   11  Cognitive disorder (294 9) (F09)   12  Esophageal reflux (530 81) (K21 9)   13  Fatigue (780 79) (R53 83)   14  Female pelvic pain (625 9) (R10 2)   15  Fibromyalgia (729 1) (M79 7)   16  Generalized anxiety disorder (300 02) (F41 1)   17  Hypertension (401 9) (I10)   18  Hypokalemia (276 8) (E87 6)   19  Insomnia (780 52) (G47 00)   20  Lumbar spondylosis (721 3) (M47 816)   21  Major depressive disorder, recurrent episode, moderate degree (296 32) (F33 1)   22  Migraine (346 90) (G43 909)   23  History of Need for prophylactic vaccination and inoculation against influenza (V04 81)    (Z23)   24  Opioid dependence (304 00) (F11 20)   25  Osteoarthritis of both hips, unspecified osteoarthritis type (715 95) (M16 0)   26  Osteoarthritis of knee (715 36) (M17 10)   27  Overactive bladder (596 51) (N32 81)   28   Pain, joint, hip (719 45) (M25 559)   29  Panic disorder without agoraphobia (300 01) (F41 0)   30  Post traumatic stress disorder (309 81) (F43 10)   31  Primary localized osteoarthritis of both knees (715 16) (M17 0)   32  Recent unexplained weight loss (783 21) (R63 4)   33  Right knee pain (719 46) (M25 561)   34  Sacroiliitis (720 2) (M46 1)   35  Tremor (781 0) (R25 1)   36  Urinary incontinence (788 30) (R32)   37  Vitamin D deficiency (268 9) (E55 9)    Current Meds   1  Acetaminophen-Codeine #3 300-30 MG Oral Tablet; TAKE 1 TABLET TWICE DAILY AS   NEEDED FOR PAIN;   Therapy: 75HHT5076 to (Evaluate:69Hna8546); Last Rx:10May2017 Ordered   2  AmLODIPine Besylate 10 MG Oral Tablet; TAKE 1 TABLET DAILY; Therapy: 16ORH3264 to (Evaluate:13May2018)  Requested for: 39UDL2181; Last   Rx:18May2017 Ordered   3  Aspirin 81 MG Oral Tablet Delayed Release; take 1 tablet by mouth daily; Therapy: 93FMV8312 to (Evaluate:35Oza3149)  Requested for: 34ACQ4781; Last   Rx:75Fjk3917 Ordered   4  Diclofenac Potassium 50 MG Oral Tablet; TAKE 1 TABLET TWICE DAILY AFTER MEALS; Therapy: 24ODH6724 to (Susanne Jones)  Requested for: 20LRT5676; Last   Rx:09Jan2017 Ordered   5  Gabapentin 400 MG Oral Capsule; TAKE 1 CAPSULE AT BEDTIME; Therapy: 18PHV3738 to (Evaluate:86Dgz1342)  Requested for: 83XWH3492; Last   Rx:14Jun2017 Ordered   6  LORazepam 2 MG Oral Tablet; TAKE 1 TABLET 3 TIMES DAILY; Therapy: 81QDF2446 to (Evaluate:90Ppp8388); Last Rx:24May2017 Ordered   7  Meloxicam 7 5 MG Oral Tablet; Take 1 tablet twice daily as needed; Therapy: 90OEF5362 to (Evaluate:12Ivw5328)  Requested for: 21Jun2017; Last   EQ:36ZPK1936 Ordered   8  Morphine Sulfate ER 15 MG Oral Tablet Extended Release; take 1 po in am;   Therapy: 08JML3197 to (Evaluate:91Udc9100); Last Rx:19Jun2017 Ordered   9  Omeprazole 20 MG Oral Capsule Delayed Release; TAKE 1 CAPSULE DAILY EVERY   MORNING BEFORE BREAKFAST;    Therapy: 72ULC7138 to (Urban Sigala)  Requested for: 74PXS3628; Last   Rx:97Rhk8383 Ordered   10  Primidone 250 MG Oral Tablet; TAKE 1 TABLET AT BEDTIME; Therapy: 39SHS0158 to (Evaluate:83Cat1011)  Requested for: 58ZEE4088; Last    Rx:27Nsb3417 Ordered   11  Propranolol HCl - 20 MG Oral Tablet; Take 1 tablet twice daily; Therapy: 64XKD4364 to (Evaluate:53Woa1598)  Requested for: 15DXO9640; Last    Rx:12Qwx4150 Ordered   12  RisperiDONE 2 MG Oral Tablet (RisperDAL); TAKE 1 TABLET Bedtime; Therapy: 73MXH7015 to (Evaluate:13Wev0406)  Requested for: 93OFH6554; Last    Rx:24May2017 Ordered   13  TiZANidine HCl - 2 MG Oral Tablet; Take 1 po tid prn spasms; Therapy: 57YEW9386 to (Evaluate:81Xot6264)  Requested for: 35FYB4716; Last    Rx:10May2017 Ordered   14  TraZODone HCl - 150 MG Oral Tablet; TAKE 1 TABLET Once At Bedtime; Therapy: 85HRR4579 to (Evaluate:09Wff2240)  Requested for: 68IAZ5477; Last    Rx:28Mfm0302 Ordered   15  Venlafaxine HCl - 25 MG Oral Tablet; TAKE 1 TABLET 3 TIMES DAILY WITH FOOD; Therapy: 38ZKH9259 to (Evaluate:06Lzm7535)  Requested for: 14QYP6403; Last    Rx:64Ecp2783 Ordered   16  VESIcare 10 MG Oral Tablet; Take 1 tablet daily; Therapy: 99PGG8651 to (Courtney Adams)  Requested for: 06FYZ2469; Last    Rx:41Ezh2031 Ordered   17  Vitamin D3 1000 UNIT Oral Capsule; TAKE 1 CAPSULE DAILY; Therapy: 84QWM9791 to (Evaluate:26Xeo6903)  Requested for: 57UNH7197; Last    Rx:01Bqa0422 Ordered    Allergies    1   No Known Drug Allergies    Signatures   Electronically signed by : Edwin Pleitez, ; Jul 12 2017  8:47AM EST                       (Author)

## 2018-01-12 NOTE — RESULT NOTES
Message   Recorded as Task   Date: 01/25/2017 08:18 AM, Created By: Kathleen Regan   Task Name: Follow Up   Assigned To: [facility],Team   Regarding Patient: Vanessa RAMÍREZ, Status: Active   CommentMendoza Watson - 25 Jan 6270 9:04 AM     TASK CREATED  S/P B/L L4-5, L5-S1 FACET JOINT INJ ON 1/18/2017 W/ DR Alex Montes - F/U SCHEDULED ON 2/28/2017 W/ Anuradha Navarro - 25 Jan 6049 69:26 AM     TASK IN PROGRESS   Kathleen Regan - 25 Jan 6555 58:50 AM     TASK EDITED  1st Attempt     LM on VM to Cb   Kathleen Regan - 01 Feb 5931 4:70 AM     TASK EDITED  Pt reports she is doing okay  She is taking the diclofenac but not sure if it is really helping -     She is just watching what she is doing  Confirmed f/u appt     Mary Ann De La Vega - 01 Feb 2017 11:55 AM     TASK REPLIED TO: Previously Assigned To [address] [place] [address]   Electronically signed by : Sivan Thomas, ; Feb 2 2017 10:39AM EST                       (Author)

## 2018-01-12 NOTE — PSYCH
Date of Initial Treatment Plan: (Chart not available  )  Date of Current Treatment Plan: 6/ 7 /17  Treatment Plan 4  Strengths/Personal Resources for Self Care: I am a Survivor  I survived a stroke several years ago  I take care of my health appointments, (Physical, Mental Health,and for my Pain Management )  I raised 3 children,and I help with my 3 yr old Grandson, and I help a little with my daughter's business  Diagnosis:   Axis I: Bipolar II Disorder, F31 81; Generalized Anxiety Disorder, F41 10; Bereavement  Axis II: Deferred  Axis III: Arthritis; Hypertension; Fibromyalgia; Chronic Pain; Gastric reflux ; Hx of Stroke; Cognitive changes  Area of Needs: I want to feel normal; I want to comprehend well,and communicate well with people  Long Term Goals:   I will feel more normal; I will communicate my thoughts and feelings better, with others  Target Date: 10/ 7 /17  Short Term Objectives:   Goal 1:   I participate in Psychotherapy  I take Meds as prescribed by Dr Prosper Loaiza, and by my other doctors  In Therapy, I process my stressors, my losses,and my thoughts, feelings,and behaviors  I process my Grief regarding the death of my Brother this Spring--->I have Grief Therapy here  I will focus on one positive thought or goal , per day,and give a self-affirmation each day  I will take some time for myself, each day  I help with,and interact with my 3 yr old Veto Pimple 'Bobby'---he makes me laugh, too  I will communicate my thoughts and needs to my family ,and will try to call a friend monthly  Other Positive Coping I do includes: Prayer, Meditation, and I'm going on a two week trip to [place] with my daughter on June 9, to help with her baby, while she does Business/ Fashion Industry  I also have an ICM Celia Maciel) through [facility]  Target Date: 10/ 7 /17  GOAL 1: Modality: Individual 2 to 3 x per month Target Date: 10 /7/17     GOAL 1: Modality: Group 1 to2 x per month Completion Date: Bipolar group she completed in 2016  GOAL 1: Modality: Family Offered  x per month   GOAL 1: Modality: Medication Management 1 x per month Target Date: Ongoing  The person(s) responsible for carrying out the plan is Client: Samantha; Therapist: Natalee Mir; Psychiatrist: Dr Ting Estrada  The first scheduled review date is 10/ 7 /17  The expected length of service is 3 to 4 more months       Level of functioning at initial assessment: 48  The highest level of functioning in the past year was 50  The current level of functioning is 50  CLIENT COMMENTS / Please share your thoughts, feelings, need and/or experiences regarding your treatment plan: _____________________________________________________________________________________________________________________________________________________________________________________________________________________________________________________________________________________________________________________ Date/Time: ______________     Patient Signature: _________________________________ Date/Time: ______________        1  Analgesic use (V58 69) (Z79 899)   2  Anxiety (300 00) (F41 9)   3  Arthralgia Of Shoulder Region (719 41)   4  Back pain (724 5) (M54 9)   5  Bipolar II disorder (296 89) (F31 81)   6  Chronic bilateral low back pain with bilateral sciatica (724 2,724 3,338 29)   (M54 42,M54 41,G89 29)   7  Chronic pain of both knees (131 63,110 42) (M25 561,M25 562,G89 29)   8  Chronic pain syndrome (338 4) (G89 4)   9  Cognitive disorder (294 9) (F09)   10  Esophageal reflux (530 81) (K21 9)   11  Fatigue (780 79) (R53 83)   12  Female pelvic pain (625 9) (R10 2)   13  Fibromyalgia (729 1) (M79 7)   14  Generalized anxiety disorder (300 02) (F41 1)   15  Hypertension (401 9) (I10)   16  Hypokalemia (276 8) (E87 6)   17  Insomnia (780 52) (G47 00)   18   Lumbar spondylosis (721 3) (M47 816)   19  Major depressive disorder, recurrent episode, moderate degree (296 32) (F33 1)   20  Migraine (346 90) (G43 909)   21  History of Need for prophylactic vaccination and inoculation against influenza (V04 81)    (Z23)   22  Opioid dependence (304 00) (F11 20)   23  Osteoarthritis of both hips, unspecified osteoarthritis type (715 95) (M16 0)   24  Osteoarthritis of knee (715 36) (M17 10)   25  Overactive bladder (596 51) (N32 81)   26  Pain, joint, hip (719 45) (M25 559)   27  Panic disorder without agoraphobia (300 01) (F41 0)   28  Post traumatic stress disorder (309 81) (F43 10)   29  Recent unexplained weight loss (783 21) (R63 4)   30  Right knee pain (719 46) (M25 561)   31  Sacroiliitis (720 2) (M46 1)   32  Tremor (781 0) (R25 1)   33  Urinary incontinence (788 30) (R32)   34   Vitamin D deficiency (268 9) (E55 9)     Electronically signed by : [facility] [employer]; Jun 7 2017 11:34AM EST                       (Author)    Electronically signed by : [facility] [employer] [facility]; Jun 7 2017 11:35AM EST                       (Author)

## 2018-01-12 NOTE — PSYCH
Progress Note  Psychotherapy Provided [facility]: Individual Psychotherapy 50 minutes provided today  Goals addressed in session:   Goal #1 ( See New Treatment plan, completed today )  D: "I'm sorry I haven't been here for a while  Raman Talley I've been helping my daughter   she is VERY busy with fashion shows and her Fashion Week---I'm helping her with her 1 mos old baby, my Grandson! Here's his picture!"    Pt has a brighter mood and affect  No mood-swings  Her sleep and appetite are good  Pt lost some weight,as she is more active while babysitting her 1 mos old Grandson---And, pt feels good weighing 144 lbs at this time, fitting into her clothing comfortably  I give her affirmation for her healthful coping activities  Pt processes her thoughts, feelings,and behaviors  Pt still has occasional memory-lapses,and word-finding difficulties, but these don't prevent her from completing her daily ADL's,and participating in family activities  Pt says she is 'recovered as she is going to get', from a reported stroke of 6 years ago  We do pt's new Treatment Plan today  Pt is not going to participate in Bipolar Group anymore; (it is still in question whether she actually HAS Bipolar, but evidently Dr Christina Kulkarni gave her that Dx  previously, before Dr Rivas Crook  left this practice ) Pt has missed several Groups, over time   but she will continue Individual psychotherapy here,and continue her meds as prescribed by Giuseppe Seay  Pt takes the [employer] mini-bus to appointments,and feels comfortable now, re: same  A: Major Depressive Disorder, recurrent, moderate now, F33 1; Anxiety Disorder, F41 9; R/O Bipolar; R/O Cognitive Disorder, mild  P: Continue new Treatment Plan, Meds ,and Psychotherapy,and Positive Coping activities  Pain Scale and Suicide Risk [facility]: Current Pain Assessment: moderate to severe   On a scale of 0 to 10, the patient rates current pain at 1   Current suicide risk is low      [address]: Diagnosis and Treatment Plan explained to patient, patient relates understanding diagnosis and is agreeable to Treatment Plan  Assessment    1  Major depressive disorder, recurrent episode, moderate degree (296 32) (F33 1)   2   Anxiety (300 00) (F41 9)    Signatures   Electronically signed by : VAZQUEZ Huff; Sep  8 2016  9:08PM EST                       (Author)    Electronically signed by : VAZQUEZ Huff; Sep  8 2016  9:08PM EST                       (Author)

## 2018-01-12 NOTE — PSYCH
Psych Med Mgmt    Appearance: was calm and cooperative, adequate hygiene and grooming and good eye contact  Observed mood: depressed  Observed mood: affect was constricted  Speech: a normal rate and fluent  Thought processes: coherent/organized  Hallucinations: no hallucinations present  Thought Content: no delusions  Abnormal Thoughts: The patient has no suicidal thoughts and no homicidal thoughts  Orientation: The patient is oriented to person, place and time, oriented to person, oriented to place and oriented to time  Recent and Remote Memory: short term memory impaired and long term memory intact  Attention Span And Concentration: concentration impaired  Insight: Limited insight  Judgment: Her judgment was limited  Muscle Strength And Tone  Muscle strength and tone were normal    The patient is experiencing moderate to severe pain  Goals addressed in session: Medication Management       Treatment Recommendations: Continue current treatment  Risks, Benefits And Possible Side Effects Of Medications: Risks, benefits, and possible side effects of medications explained to patient and patient verbalizes understanding  She reports normal appetite, decreased energy, no weight change and normal number of sleep hours  Mood is dysphoric  She stated that she has been dealing with pain in her hip and knees and she is scheduled to see a pain management doctor to discuss treatment  She continues to c/o memory problems  Vitals  Signs   Recorded: 64Jqd4054 02:03PM   Height: 5 ft 1 in  Weight: 144 lb   BMI Calculated: 27 21  BSA Calculated: 1 64    Assessment    1  Anxiety (300 00) (F41 9)   2  Cognitive disorder (294 9) (F09)   3  Major depressive disorder, recurrent episode, moderate degree (296 32) (F33 1)    Plan    1  Venlafaxine HCl - 25 MG Oral Tablet; TAKE 1 TABLET 3 TIMES DAILY WITH FOOD    2  TraZODone HCl - 150 MG Oral Tablet; TAKE 1 TABLET Once At Bedtime    3  RisperiDONE 2 MG Oral Tablet (RisperDAL); TAKE 1 TABLET Bedtime    Review of Systems    Constitutional: as noted in HPI  Substance Abuse Hx    Substance Abuse History: denies  Active Problems    1  Anxiety (300 00) (F41 9)   2  Arthralgia Of Shoulder Region (719 41)   3  Back pain (724 5) (M54 9)   4  Bipolar II disorder (296 89) (F31 81)   5  Chronic pain of both knees (543 32,019 82) (M25 561,M25 562,G89 29)   6  Cognitive disorder (294 9) (F09)   7  Depression (311) (F32 9)   8  Esophageal reflux (530 81) (K21 9)   9  Fatigue (780 79) (R53 83)   10  Female pelvic pain (625 9) (R10 2)   11  Fibromyalgia (729 1) (M79 7)   12  Hypertension (401 9) (I10)   13  Hypokalemia (276 8) (E87 6)   14  Insomnia (780 52) (G47 00)   15  Left knee pain (719 46) (M25 562)   16  Major depressive disorder, recurrent episode, moderate degree (296 32) (F33 1)   17  Migraine (346 90) (G43 909)   18  History of Need for prophylactic vaccination and inoculation against influenza (V04 81)    (Z23)   19  Osteoarthritis of knee (715 36) (M17 9)   20  Overactive bladder (596 51) (N32 81)   21  Pain, joint, hip (719 45) (M25 559)   22  Panic disorder without agoraphobia (300 01) (F41 0)   23  Post traumatic stress disorder (309 81) (F43 10)   24  Recent unexplained weight loss (783 21) (R63 4)   25  Right knee pain (719 46) (M25 561)   26  Tremor (781 0) (R25 1)   27  Urinary incontinence (788 30) (R32)   28  Vitamin D deficiency (268 9) (E55 9)    Past Medical History    1  History of Acute nonsuppurative otitis media, unspecified laterality (381 00) (H65 199)   2  History of Bipolar disorder (296 80) (F31 9)   3  History of Encounter for screening colonoscopy (V76 51) (Z12 11)   4  History of H/O colonoscopy (V45 89) (Z98 89)   5  History of nausea and vomiting (V12 79) (Z87 898)   6  History of stroke (V12 54) (Z86 73)   7  History of urinary tract infection (V13 02) (Z87 440)   8   History of Memory loss (780 93) (R41 3) 9  History of Mixed Anxiety Disorder (300 00)   10  History of Need for hepatitis C screening test (V73 89) (Z11 59)   11  History of Need for prophylactic vaccination and inoculation against influenza (V04 81)    (Z23)   12  History of Need for Tdap vaccination (V06 1) (Z23)   13  Personal history of arthritis (V13 4) (Z87 39)   14  History of Previous Spontaneous Vaginal Delivery   15  History of Screening for lipoid disorders (V77 91) (Z13 220)   16  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)   17  History of Visit for screening mammogram (V76 12) (Z12 31)    The active problems and past medical history were reviewed and updated today  Allergies    1  No Known Drug Allergies    Current Meds   1  AmLODIPine Besylate 10 MG Oral Tablet; TAKE 1 TABLET DAILY; Therapy: 19VRP9008 to (Evaluate:01Jun2017)  Requested for: 59DHG7863; Last   Rx:06Jun2016 Ordered   2  Aspirin 81 MG TABS; TAKE 1 TABLET DAILY; Therapy: 67JVW4976 to (Evaluate:18Feb2017)  Requested for: 49Lus7910; Last   Rx:43Sgk7902 Ordered   3  Baclofen 10 MG Oral Tablet; TAKE 1 TABLET Bedtime; Therapy: 25Dzq1181 to (Evaluate:84Qgx0039)  Requested for: 52Itj8327; Last   Rx:60Bwy0328 Ordered   4  Ibuprofen 800 MG Oral Tablet; TAKE 1 TABLET 3 TIMES DAILY WITH FOOD AS NEEDED; Therapy: 58IUS9332 to (Evaluate:39Jfi9769)  Requested for: 11Vqb2614; Last   Rx:26Rae7099 Ordered   5  LORazepam 2 MG Oral Tablet; TAKE 1 TABLET 3 TIMES DAILY; Therapy: 36TPY9728 to (Evaluate:20Nov2016); Last Rx:49Ifj1087 Ordered   6  Omeprazole 20 MG Oral Capsule Delayed Release; TAKE 1 CAPSULE DAILY EVERY   MORNING BEFORE BREAKFAST; Therapy: 02NOJ3237 to (Evaluate:18Feb2017)  Requested for: 22Sfa9811; Last   Rx:44Vgg7259 Ordered   7  Primidone 250 MG Oral Tablet; TAKE 1 TABLET AT BEDTIME; Therapy: 12GQK5223 to (Evaluate:21Oct2016)  Requested for: 51CMD6712; Last   CB:21TXN8410 Ordered   8  Propranolol HCl - 20 MG Oral Tablet;  Take 1 tablet twice daily; Therapy: 09WXQ2757 to (Evaluate:01Jun2017)  Requested for: 87KPZ8206; Last   BX:77XVD9075 Ordered   9  RisperiDONE 2 MG Oral Tablet; TAKE 1 TABLET Bedtime; Therapy: 26VII2009 to (Rickey Billing)  Requested for: 96Jcx3530; Last   Rx:55Wir1797 Ordered   10  TraMADol HCl - 50 MG Oral Tablet; TAKE 1 TABLET 3 TIMES DAILY AS NEEDED; Therapy: 40BDM6178 to (Evaluate:24Apr2016); Last Rx:70Clj5210 Ordered   11  TraZODone HCl - 150 MG Oral Tablet; TAKE 1 TABLET Once At Bedtime; Therapy: 59HJR1965 to (Rickey Billing)  Requested for: 31Hsi2306; Last    Rx:71Lhj2435 Ordered   12  Venlafaxine HCl - 25 MG Oral Tablet; TAKE 1 TABLET 3 TIMES DAILY WITH FOOD; Therapy: 43NNG2788 to (Rickey Billing)  Requested for: 21Drx0351; Last    Rx:15Fne3498 Ordered   13  VESIcare 10 MG Oral Tablet; Take 1 tablet daily; Therapy: 45ZBX8253 to (Evaluate:32Pfs9020)  Requested for: 06HCP6079; Last    Rx:07Vpy4293 Ordered   14  VESIcare 10 MG Oral Tablet; Take 1 tablet daily; Therapy: 21CDT6986 to (Evaluate:22Jun2016); Last Rx:24Mar2016 Ordered   15  Vitamin D3 1000 UNIT Oral Capsule; TAKE 1 CAPSULE DAILY; Therapy: 20YDN6860 to (Evaluate:97Cbn2606)  Requested for: 01Qqx4675; Last    Rx:54Srl0476 Ordered    The medication list was reviewed and updated today  Family Psych History  Mother    1  Family history of Colon Cancer (V16 0)  Father    2  Family history of Alzheimer's disease (V17 2) (Z82 0)   3  Family history of cerebrovascular accident (V17 1) (Z82 3)  Son    4  Family history of seizures (V19 8) (Z84 89)  Brother    5  Family history of bipolar disorder (V17 0) (Z81 8)  Maternal Aunt    6  Family history of Breast Cancer (V16 3)    The family history was reviewed and updated today         Social History    · Being A Social Drinker   · Daily caffeine consumption, 6-8 servings a day   ·    · Education Level: Graduate school   · Lives in [address] [place] [address]   · Never A Smoker   · No drug use  The social history was reviewed and updated today  The social history was reviewed and is unchanged  Daughter is staying with her in the mean time to help with her baby      History Of Phys/Sex Abuse Or Perpetration    History Of Phys/Sex Abuse or Perpetration: denies  End of Encounter Meds    1  Baclofen 10 MG Oral Tablet; TAKE 1 TABLET Bedtime; Therapy: 52OLK2552 to (Evaluate:62Rvj6628)  Requested for: 90Azi5474; Last   Rx:97Nzu5247 Ordered   2  TraMADol HCl - 50 MG Oral Tablet; TAKE 1 TABLET 3 TIMES DAILY AS NEEDED; Therapy: 44HKN7362 to (Evaluate:61Hte1954); Last Rx:51Ppa7058 Ordered    3  Omeprazole 20 MG Oral Capsule Delayed Release; TAKE 1 CAPSULE DAILY EVERY   MORNING BEFORE BREAKFAST; Therapy: 41TSS6053 to (Evaluate:44Gal7094)  Requested for: 11Sbs8881; Last   Rx:62Wvu2281 Ordered    4  LORazepam 2 MG Oral Tablet; TAKE 1 TABLET 3 TIMES DAILY; Therapy: 84BXB9973 to (Evaluate:71Wkg4411); Last Rx:59Iti5423 Ordered   5  Venlafaxine HCl - 25 MG Oral Tablet; TAKE 1 TABLET 3 TIMES DAILY WITH FOOD; Therapy: 08JJW0098 to (Evaluate:02Onh8751)  Requested for: 10Zpm7929; Last   Rx:32Okq6784; Status: ACTIVE - Transmit to Pharmacy - Awaiting Verification Ordered    6  AmLODIPine Besylate 10 MG Oral Tablet; TAKE 1 TABLET DAILY; Therapy: 80YFR4152 to (Evaluate:01Jun2017)  Requested for: 91VOW5939; Last   YV:45KAV6889 Ordered   7  Ibuprofen 800 MG Oral Tablet; TAKE 1 TABLET 3 TIMES DAILY WITH FOOD AS NEEDED; Therapy: 47QYB0792 to (Evaluate:44Nkr7282)  Requested for: 50Ykt2091; Last   Rx:62Vxg1222 Ordered    8  TraZODone HCl - 150 MG Oral Tablet; TAKE 1 TABLET Once At Bedtime; Therapy: 65BIQ9519 to (Evaluate:52Jvc3793)  Requested for: 02Jnd4479; Last   Rx:02Fyr5865; Status: ACTIVE - Transmit to Pharmacy - Awaiting Verification Ordered    9  Propranolol HCl - 20 MG Oral Tablet; Take 1 tablet twice daily; Therapy: 41SLO5878 to (Evaluate:70Xsi2098)  Requested for: 77OYQ2338; Last   NT:84COR2171 Ordered    10  VESIcare 10 MG Oral Tablet; Take 1 tablet daily; Therapy: 11THB6200 to (Evaluate:60Omx1582)  Requested for: 39NLS0741; Last    Rx:33Elr3088 Ordered    11  VESIcare 10 MG Oral Tablet; Take 1 tablet daily; Therapy: 91KES9004 to (Evaluate:33Svy9488); Last Rx:94Yzo4678 Ordered    12  RisperiDONE 2 MG Oral Tablet (RisperDAL); TAKE 1 TABLET Bedtime; Therapy: 93KKX4917 to (Evaluate:96Ova4150)  Requested for: 40Lql0207; Last    Rx:02Eci8760; Status: ACTIVE - Transmit to [facility] Ordered    13  Aspirin 81 MG TABS; TAKE 1 TABLET DAILY; Therapy: 34KLR4638 to (Evaluate:64Aeu8844)  Requested for: 66Pcf2615; Last    Rx:82Kuz4619 Ordered    14  Primidone 250 MG Oral Tablet; TAKE 1 TABLET AT BEDTIME; Therapy: 12NZH6489 to (Evaluate:63Pwy5563)  Requested for: 32DQQ5147; Last    MG:45UZN1734 Ordered    15  Vitamin D3 1000 UNIT Oral Capsule; TAKE 1 CAPSULE DAILY; Therapy: 85UUH6688 to (Evaluate:87Iwr6918)  Requested for: 09Yya0659; Last    Rx:84Aya2519 Ordered    Future Appointments    Date/Time Provider Specialty Site   01/09/2017 09:15 AM Drucie Severance, M D   [address]   09/19/2016 02:40 PM Specialty Clinic, Ortho Room [address] [place] [address]   09/15/2016 02:15 PM Leopold Care, MS, APRN, [facility]BS  [facility]   09/22/2016 02:15 PM Leopold Care, MS, APRN, [facility]BS  [facility]   10/06/2016 02:15 PM [facility], MS, APRN, [facility]  [address]     Signatures   Electronically signed by : ASHLEY Alcantara ; Sep 13 2016  2:16PM EST                       (Author)

## 2018-01-12 NOTE — PSYCH
Date of Initial Treatment Plan: (Chart not Available)  Date of Current Treatment Plan: 9 /8 /16  Treatment Plan 3  Strengths/Personal Resources for Self Care: I raised 3 children  I worked Security, and [employer], and Housekeeping jobs, in the past  I stopped working since my stroke about 6 years ago  I help with my grandson, who is 1 months old  I help my daughter Ochoa Cabrera, who organizes [address], organizes models&fashions  Diagnosis:   Axis I: Major Depressive Disorder, F33 1; Anxiety Disorder, F41 9  R/O past Bipolar, as rx'd by Dr Parker Masters years ago, pt  states ; R/O Cognitive Disorder NOS  Axis II: Deferred  Axis III: Fibromyalgia; Arthritis; Chronic Pain; Gastric reflux; past hx of stroke  Area of Needs: I have anxiety, depression, and I don't talk to many people  Long Term Goals:   I will have brighter mood, less anxiety, and feel like I'm moving on with my life, more  Target Date: 1 /8 /17  Short Term Objectives:   Goal 1:   I participate in Psychotherapy  I take meds as prescribed by Dr Taj Kelly  I process my thoughts, feelings,and behaviors  I identify my stressors  I do some cognitive-reframing, that is, replacing some of my automatic negative thoughts with more realistic thoughts, and I have more positive thoughts than I used to  Positive Coping activities for me are: Helping to take care of my grandson; helping my daughter with her [employer] phone calls help her, and I also help the Models get their fashion styles on, at the shows; I pray and meditate, and ask God for strength and I give my burdens to Him  I will try to talk to people each day ,and not be isolated  I promote peace , instead of conflict  Target Date: 1 /8 /17  GOAL 1: Modality: Individual 2 x per month Target Date: 1/ 8/ 17  GOAL 1: Modality: Group Completed already   x per month     GOAL 1: Modality: Medication Management 1 x per month Target Date: Ongoing  The person(s) responsible for carrying out the plan is Client: Samantha; Therapist: Kathy Valdes; and Dr Jos Osuna, psychiatrist                      The first scheduled review date is 1 /8 / 16       The expected length of service is 3 to 4 more months       Level of functioning at initial assessment: 48  The highest level of functioning in the past year was 50  The current level of functioning is 50  CLIENT COMMENTS / Please share your thoughts, feelings, need and/or experiences regarding your treatment plan: _____________________________________________________________________________________________________________________________________________________________________________________________________________________________________________________________________________________________________________________ Date/Time: ______________     Patient Signature: _________________________________ Date/Time: ______________        1  Arthralgia Of Shoulder Region (719 41)   2  Back pain (724 5) (M54 9)   3  Bipolar II disorder (296 89) (F31 81)   4  Chronic pain of both knees (022 15,001 83) (M25 561,M25 562,G89 29)   5  Cognitive disorder (294 9) (F09)   6  Depression (311) (F32 9)   7  Esophageal reflux (530 81) (K21 9)   8  Fatigue (780 79) (R53 83)   9  Female pelvic pain (625 9) (R10 2)   10  Fibromyalgia (729 1) (M79 7)   11  Hypertension (401 9) (I10)   12  Hypokalemia (276 8) (E87 6)   13  Insomnia (780 52) (G47 00)   14  Left knee pain (719 46) (M25 562)   15  Migraine (346 90) (G43 909)   16  History of Need for prophylactic vaccination and inoculation against influenza (V04 81)    (Z23)   17  Osteoarthritis of knee (715 36) (M17 9)   18  Overactive bladder (596 51) (N32 81)   19  Pain, joint, hip (719 45) (M25 559)   20  Panic disorder without agoraphobia (300 01) (F41 0)   21  Post traumatic stress disorder (309 81) (F43 10)   22   Recent unexplained weight loss (783 21) (R63 4)   23  Right knee pain (719 46) (M25 561)   24  Tremor (781 0) (R25 1)   25  Urinary incontinence (788 30) (R32)   26   Vitamin D deficiency (268 9) (E55 9)     Electronically signed by : VAZQUEZ Louis; Sep  8 2016  3:20PM EST                       (Author)    Electronically signed by : VAZQUEZ Louis; Sep  8 2016  3:21PM EST                       (Author)

## 2018-01-12 NOTE — PSYCH
Psych Med Mgmt    Appearance: was calm and cooperative, adequate hygiene and grooming and good eye contact  Observed mood: anxious  Observed mood: affect was constricted  Speech: a normal rate and fluent  Thought processes: coherent/organized  Hallucinations: no hallucinations present  Thought Content: no delusions  Abnormal Thoughts: The patient has no suicidal thoughts and no homicidal thoughts  Orientation: The patient is oriented to person, place and time, oriented to person, oriented to place and oriented to time  Recent and Remote Memory: short term memory impaired and long term memory impaired  stated she has worsening confusion and memory problems Attention Span And Concentration: concentration impaired  Insight: Limited insight  Judgment: Her judgment was limited  Muscle Strength And Tone  Muscle strength and tone were normal    The patient is experiencing no localized pain  Goals addressed in session: Medication Management     Treatment Recommendations: Continue current medications  Risks, Benefits And Possible Side Effects Of Medications: Risks, benefits, and possible side effects of medications explained to patient and patient verbalizes understanding  She reports normal appetite, decreased energy, no weight change and normal number of sleep hours  She has long history of anxiety and I have problems getting her to shama off benzodiazepines  She has been complaining of poor memory and poor concentration and confusion a problems with expressive and receptive language  C/o dry mouth as well  She has seen a neurologist and she was told that her MRI didn't shoe evidence of stoke and she went through speech therapy  She has very little support from her family and she comes to her appointments with a         Vitals  Signs [Data Includes: Current Encounter]   Recorded: 14Sem3198 11:14AM   Height: 5 ft   Weight: 145 lb   BMI Calculated: 28 32  BSA Calculated: 1 63    Assessment    1  Bipolar II disorder (296 89) (F31 81)   2  Panic disorder without agoraphobia (300 01) (F41 0)   3  Post traumatic stress disorder (309 81) (F43 10)    Plan    1  LORazepam 2 MG Oral Tablet; TAKE 1 TABLET 3 TIMES DAILY   2  Venlafaxine HCl - 25 MG Oral Tablet; TAKE 1 TABLET 3 TIMES DAILY WITH FOOD    3  TraZODone HCl - 150 MG Oral Tablet; TAKE 1 TABLET Once At Bedtime    4  RisperiDONE 2 MG Oral Tablet (RisperDAL); TAKE 1 TABLET Bedtime    Review of Systems    Constitutional: No fever, no chills, feels well, no tiredness, no recent weight gain or loss  Cardiovascular: no complaints of slow or fast heart rate, no chest pain, no palpitations  Respiratory: no complaints of shortness of breath, no wheezing, no dyspnea on exertion  Gastrointestinal: no complaints of abdominal pain, no constipation, no nausea, no diarrhea, no vomiting  Genitourinary: no complaints of dysuria, no incontinence, no pelvic pain, no urinary frequency  Musculoskeletal: no complaints of arthralgia, no myalgias, no limb pain, no joint stiffness  Integumentary: no complaints of skin rash, no itching, no dry skin  Neurological: no complaints of headache, no confusion, no numbness, no dizziness  Active Problems    1  Arthralgia Of Shoulder Region (719 41)   2  Back pain (724 5) (M54 9)   3  Bipolar II disorder (296 89) (F31 81)   4  Cognitive disorder (294 9) (F09)   5  Depression (311) (F32 9)   6  Esophageal reflux (530 81) (K21 9)   7  Fatigue (780 79) (R53 83)   8  Female pelvic pain (625 9) (R10 2)   9  Fibromyalgia (729 1) (M79 7)   10  Hip pain (719 45) (M25 559)   11  Hypertension (401 9) (I10)   12  Hypokalemia (276 8) (E87 6)   13  Insomnia (780 52) (G47 00)   14  Left knee pain (719 46) (M25 562)   15  Migraine (346 90) (G43 909)   16  Nausea with vomiting (787 01) (R11 2)   17  History of Need for prophylactic vaccination and inoculation against influenza (V04 81)    (Z23)   18   Osteoarthritis of knee (715 36) (M17 9)   19  Overactive bladder (596 51) (N32 81)   20  Panic disorder without agoraphobia (300 01) (F41 0)   21  Post traumatic stress disorder (309 81) (F43 10)   22  Right knee pain (719 46) (M25 561)   23  Tremor (781 0) (R25 1)   24  Urinary incontinence (788 30) (R32)   25  Vitamin D deficiency (268 9) (E55 9)    Past Medical History    1  History of Acute nonsuppurative otitis media, unspecified laterality (381 00) (H65 199)   2  History of Bipolar disorder (296 80) (F31 9)   3  History of Encounter for screening colonoscopy (V76 51) (Z12 11)   4  History of H/O colonoscopy (V45 89) (Z98 89)   5  History of stroke (V12 54) (Z86 73)   6  History of urinary tract infection (V13 02) (Z87 440)   7  History of Memory loss (780 93) (R41 3)   8  History of Mixed Anxiety Disorder (300 00)   9  History of Need for hepatitis C screening test (V73 89) (Z11 59)   10  History of Need for prophylactic vaccination and inoculation against influenza (V04 81)    (Z23)   11  History of Need for Tdap vaccination (V06 1) (Z23)   12  Personal history of arthritis (V13 4) (Z87 39)   13  History of Previous Spontaneous Vaginal Delivery   14  History of Screening for lipoid disorders (V77 91) (Z13 220)   15  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)   16  History of Visit for screening mammogram (V76 12) (Z12 31)    The active problems and past medical history were reviewed and updated today  Allergies    1  No Known Drug Allergies    Current Meds   1  AmLODIPine Besylate 10 MG Oral Tablet; TAKE 1 TABLET DAILY; Therapy: 08NEI9589 to (Samantha Hernandez)  Requested for: 91BUQ0877; Last   Rx:26Oct2015 Ordered   2  Aspirin 81 MG Oral Tablet; TAKE 1 TABLET DAILY; Therapy: 16KJJ4710 to (Evaluate:77Hon0545)  Requested for: 64BJC3388; Last   Rx:05Jan2016 Ordered   3  Baclofen 10 MG Oral Tablet; TAKE 1 TABLET Bedtime; Therapy: 23Xbp7751 to (Samantha Hernandez)  Requested for: 58QMO7469;  Last Rx: 26Oct2015 Ordered   4  Klor-Con 20 MEQ Oral Packet; TAKE 1 PACKET DAILY; Therapy: 85OWH0072 to Recorded   5  LORazepam 2 MG Oral Tablet; TAKE 1 TABLET 3 TIMES DAILY; Therapy: 73KIM3763 to (Evaluate:25Apr2016); Last Rx:26Jan2016 Ordered   6  Meloxicam 15 MG Oral Tablet; TAKE 1 TABLET DAILY WITH FOOD; Therapy: 43ADR3073 to (Evaluate:12May2016)  Requested for: 49UUH9454; Last   Rx:13Jan2016 Ordered   7  Omeprazole 20 MG Oral Capsule Delayed Release; TAKE 1 CAPSULE DAILY EVERY   MORNING BEFORE BREAKFAST; Therapy: 56HXN3431 to (21 )  Requested for: 444 14 907; Last   Rx:26Oct2015 Ordered   8  Oxybutynin Chloride 5 MG Oral Tablet; TAKE 1 TABLET TWICE DAILY; Therapy: 62HYK3200 to (Shaheen Liriano)  Requested for: 15AAZ4970; Last   Rx:42Vxw3359 Ordered   9  Primidone 250 MG Oral Tablet; TAKE 1 TABLET AT BEDTIME; Therapy: 29MYZ0529 to (26 [id number])  Requested for: 56FUS7667; Last   Rx:29Jan2016 Ordered   10  Propranolol HCl - 20 MG Oral Tablet; Take 1 tablet twice daily; Therapy: 11BWL6389 to (Lev Salvage)  Requested for: 94IUN6210; Last    Rx:26Oct2015 Ordered   11  RisperiDONE 2 MG Oral Tablet; TAKE 1 TABLET Bedtime; Therapy: 13FES9731 to (Evaluate:30Mar2016)  Requested for: 07Kjg6630; Last    Rx:63Snu6256 Ordered   12  Toviaz 4 MG Oral Tablet Extended Release 24 Hour; take 1 tab daily; Therapy: 27KFL1827 to (Last Rx:36Fzi1697)  Requested for: 61Hti9263 Ordered   13  TraZODone HCl - 150 MG Oral Tablet; TAKE 1 TABLET Once At Bedtime; Therapy: 96CCC3434 to (Bessie Borer)  Requested for: 76GGX3650; Last    Rx:26Jan2016 Ordered   14  Venlafaxine HCl - 25 MG Oral Tablet; TAKE 1 TABLET 3 TIMES DAILY WITH FOOD; Therapy: 53UTG1879 to (Bessie Borer)  Requested for: 97HRZ6560; Last    Rx:26Jan2016 Ordered   15  Vitamin D3 1000 UNIT Oral Capsule; TAKE 1 CAPSULE DAILY;     Therapy: 65YIS4109 to (Evaluate:87Jho9440)  Requested for: 41ZIS7547; Last    IC:68OVY3896 Ordered    The medication list was reviewed and updated today  Family Psych History    1  Family history of Colon Cancer (V16 0)    2  Family history of Alzheimer's disease (V17 2) (Z82 0)   3  Family history of cerebrovascular accident (V17 1) (Z82 3)    4  Family history of seizures (V19 8) (Z84 89)    5  Family history of bipolar disorder (V17 0) (Z81 8)    6  Family history of Breast Cancer (V16 3)    The family history was reviewed and updated today  Social History    · Being A Social Drinker   · Daily caffeine consumption, 6-8 servings a day   ·    · Education Level: Graduate school   · Never A Smoker   · No drug use  The social history was reviewed and updated today  The social history was reviewed and is unchanged  End of Encounter Meds    1  Baclofen 10 MG Oral Tablet; TAKE 1 TABLET Bedtime; Therapy: 24Yez0015 to ([facility])  Requested for: 89EJU5688; Last   Rx:26Oct2015 Ordered    2  Omeprazole 20 MG Oral Capsule Delayed Release; TAKE 1 CAPSULE DAILY EVERY   MORNING BEFORE BREAKFAST; Therapy: 49SJG2430 to (Lazaro Greenwood)  Requested for: 444 14 907; Last   Rx:26Oct2015 Ordered    3  LORazepam 2 MG Oral Tablet; TAKE 1 TABLET 3 TIMES DAILY; Therapy: 43TQG6255 to (Evaluate:76Nsx1124); Last Rx:59Jzt4902 Ordered   4  Venlafaxine HCl - 25 MG Oral Tablet; TAKE 1 TABLET 3 TIMES DAILY WITH FOOD; Therapy: 78NUP3740 to ([facility])  Requested for: 12Ltt0741; Last   Rx:90Onc6417 Ordered    5  AmLODIPine Besylate 10 MG Oral Tablet; TAKE 1 TABLET DAILY; Therapy: 89YJC7684 to ([facility])  Requested for: 95XMI7870; Last   Rx:26Oct2015 Ordered    6  TraZODone HCl - 150 MG Oral Tablet; TAKE 1 TABLET Once At Bedtime; Therapy: 62SBH2186 to ([facility])  Requested for: 02Ywj5293; Last   Rx:89Xay4658 Ordered    7  Propranolol HCl - 20 MG Oral Tablet; Take 1 tablet twice daily; Therapy: 19KNU7082 to ([facility])  Requested for: 95LQF2226;  Last Rx: 26Oct2015 Ordered    8  Meloxicam 15 MG Oral Tablet; TAKE 1 TABLET DAILY WITH FOOD; Therapy: 45STL8592 to (Evaluate:41Rnn9258)  Requested for: 42FJK6330; Last   Rx:13Jan2016 Ordered    9  Toviaz 4 MG Oral Tablet Extended Release 24 Hour; take 1 tab daily; Therapy: 81RRF9923 to ([facility] [place])  Requested for: 57VDG6664 Ordered    10  RisperiDONE 2 MG Oral Tablet (RisperDAL); TAKE 1 TABLET Bedtime; Therapy: 10AHE9085 to (Chaparro Avila)  Requested for: 64Rfs7860; Last    Rx:98Bve7812 Ordered    11  Aspirin 81 MG Oral Tablet; TAKE 1 TABLET DAILY; Therapy: 12EHG7046 to (Evaluate:72Ask9192)  Requested for: 27NCL0400; Last    Rx:05Jan2016 Ordered    12  Klor-Con 20 MEQ Oral Packet; TAKE 1 PACKET DAILY; Therapy: 05ICG8168 to Recorded    13  Primidone 250 MG Oral Tablet; TAKE 1 TABLET AT BEDTIME; Therapy: 98GQO0871 to (Tyree Perkins)  Requested for: 99JZJ7136; Last    Rx:29Jan2016 Ordered    14  Oxybutynin Chloride 5 MG Oral Tablet; TAKE 1 TABLET TWICE DAILY; Therapy: 58FOQ6291 to (Flora Rolle)  Requested for: 01RUZ8599; Last    Rx:87Jxl6231 Ordered    15  Vitamin D3 1000 UNIT Oral Capsule; TAKE 1 CAPSULE DAILY;     Therapy: 60HVB0645 to (Evaluate:03Xxb7466)  Requested for: 41LTX5068; Last    WT:34GWD6689 Ordered    Future Appointments    Date/Time Provider Specialty Site   02/24/2016 09:10 AM [facility]   03/03/2016 02:15 PM Larissa Blair, MS, APRN, PMHCNS_BS  [facility]     Signatures   Electronically signed by : ASHLEY Barros ; Feb 22 2016 11:31AM EST                       (Author)

## 2018-01-12 NOTE — PSYCH
Progress Note  Psychotherapy Provided [facility]: Individual Psychotherapy 45 minutes provided today  Goals addressed in session:   Goal #1  D: " I don't function that well "   " My memory is not good, since my stroke, and my pain is bad enough that I cannot carry my grandson up and down the stairs---but I can watch him for a while, when my daughter is working on her Computer"   " I'd like to sing [employer], and read to him, but I don't have a [facility] I don't drive---My family, or my , gives me a ride to appointments or to the store  Sometimes I take the [address], but then I have to wait longer for it to pick me up   "   " I have Pain, in my Hips, my Anthony  in my Back    I had injections into my knees on December 19  Julian Murray I might have a back Injection coming up---I'm asking my doctor if it is too soon "   Pt has an anxious, moderately depressed affect  She is alert and oriented x3, but she says she worries about her frequent Memory Lapses since her alleged stroke years ago  Denies TRISH Lara [address]  Pt processes her thoughts, feelings,and behaviors  Pt is given Listening, Support,and Validation  Ptis assisted with some positive cognitive-reframing, re: her Survivor strengths,and re: her abilities to achieve her own ADL's  Pt does find manuel in being with, and helping to take care of, her almost-6month old grandson  Pt to ask her daughter to buy a book of Nursery Rhymes which pt can read and sing to the baby,as she would enjoy doing so,and it could possibly help both her own cognitive/ memory functions, and enhance her bonding with her active little grandson, as she says he likes music  A: Bipolar Disorder, F31 81; Gen  Anxiety Disorder, F41 10  P Continue treatment plan, Meds, and psychotherapy  Pain Scale and Suicide Risk  [facility]: Current Pain Assessment: moderate to severe   On a scale of 0 to 10, the patient rates current pain at 6   Current suicide risk is low   Behavioral Health Treatment Plan [facility]: Diagnosis and Treatment Plan explained to patient, patient relates understanding diagnosis and is agreeable to Treatment Plan  Assessment    1  Bipolar II disorder (296 89) (F31 81)   2   Generalized anxiety disorder (300 02) (F41 1)    Signatures   Electronically signed by : Washington Vaughan MSAPRNPMHCNJUNI; Nikolay 10 2017  3:55PM EST                       (Author)

## 2018-01-13 VITALS
SYSTOLIC BLOOD PRESSURE: 129 MMHG | DIASTOLIC BLOOD PRESSURE: 86 MMHG | WEIGHT: 142.06 LBS | HEART RATE: 61 BPM | TEMPERATURE: 98.6 F | HEIGHT: 61 IN | BODY MASS INDEX: 26.82 KG/M2

## 2018-01-13 VITALS
BODY MASS INDEX: 26.39 KG/M2 | SYSTOLIC BLOOD PRESSURE: 141 MMHG | DIASTOLIC BLOOD PRESSURE: 92 MMHG | WEIGHT: 139.75 LBS | TEMPERATURE: 99.1 F | HEIGHT: 61 IN | HEART RATE: 70 BPM

## 2018-01-13 VITALS
TEMPERATURE: 98.4 F | DIASTOLIC BLOOD PRESSURE: 86 MMHG | BODY MASS INDEX: 26.39 KG/M2 | HEART RATE: 76 BPM | HEIGHT: 61 IN | SYSTOLIC BLOOD PRESSURE: 128 MMHG | WEIGHT: 139.77 LBS

## 2018-01-13 VITALS
HEIGHT: 61 IN | BODY MASS INDEX: 26.62 KG/M2 | WEIGHT: 141 LBS | SYSTOLIC BLOOD PRESSURE: 107 MMHG | DIASTOLIC BLOOD PRESSURE: 73 MMHG

## 2018-01-13 NOTE — PSYCH
Progress Note  Psychotherapy Provided [facility]: Group Therapy provided today  Goals addressed in session:   Goals: #1   D: Pt attended bipolar wellness group  8 members were present  Education was provided about Diagnostic symptoms  Pt introduced herself and discussed her history of mental illness  Pt discussed spending more time with her pregnant daughter which is getting her out of the house and keeping her mind off things  Pt engaged in mindfulness group breathing activity  A: Pt is excited for arrival of grandson  Pt presented with normal affect and mood  P: Continue Treatment Plan, Medications, Individual Psychotherapy, and Group Therapy, and positive coping strategies  RTO: 04/21/16 @ 2:15pm      Pain Scale and Suicide Risk [facility]: Current Pain Assessment: no pain   On a scale of 0 to 10, the patient rates current pain at 0   Current suicide risk is low   Behavioral Health Treatment Plan [facility]: Diagnosis and Treatment Plan explained to patient, patient relates understanding diagnosis and is agreeable to Treatment Plan  Assessment    1  Bipolar II disorder (296 89) (F31 81)   2  Panic disorder without agoraphobia (300 01) (F41 0)   3  Post traumatic stress disorder (309 81) (F43 10)    Signatures   Electronically signed by : Cristela Menon MSAPRNPMHCNS-BC;  Apr 7 2016  9:03PM EST                       (Author)

## 2018-01-13 NOTE — MISCELLANEOUS
Message   Recorded as Task   Date: 08/07/2017 11:34 AM, Created By: Betsy Clifton   Task Name: Follow Up   Assigned To: Geetha Elkins   Regarding Patient: Elysia Denney, Status: Active   CommentHarl Yahaira - 07 Aug 2017 11:34 AM     TASK CREATED  This is a [facility] patient and is on my schedule for tomorrow  Please let the patient know that [facility] and make sure she is ok with that  If not she needs to be re-scheduled and can be overbooked on Wednesday in [place], if needed  Thank you  Geetha Elkins - 07 Aug 2017 1:26 PM     TASK REPLIED TO: Previously Assigned To Garima Najera  PT chose to be overbooked at 10:15 on Wednesday in [place] - 07 Aug 2017 1:42 PM     TASK REPLIED TO: Previously Assigned To Betsy Clifton  Provider aware  Thank you  Active Problems    1  Analgesic use (V58 69) (Z79 899)   2  Anxiety (300 00) (F41 9)   3  Arthralgia Of Shoulder Region (719 41)   4  Back pain (724 5) (M54 9)   5  Bereavement (V62 82) (Z63 4)   6  Bilateral hip pain (719 45) (M25 551,M25 552)   7  Bipolar II disorder (296 89) (F31 81)   8  Chronic bilateral low back pain with bilateral sciatica (724 2,724 3,338 29)   (M54 42,M54 41,G89 29)   9  Chronic pain of both knees (352 63,949 80) (M25 561,M25 562,G89 29)   10  Chronic pain syndrome (338 4) (G89 4)   11  Cognitive disorder (294 9) (F09)   12  Esophageal reflux (530 81) (K21 9)   13  Fatigue (780 79) (R53 83)   14  Female pelvic pain (625 9) (R10 2)   15  Fibromyalgia (729 1) (M79 7)   16  Generalized anxiety disorder (300 02) (F41 1)   17  Hypertension (401 9) (I10)   18  Hypokalemia (276 8) (E87 6)   19  Insomnia (780 52) (G47 00)   20  Lumbar spondylosis (721 3) (M47 816)   21  Major depressive disorder, recurrent episode, moderate degree (296 32) (F33 1)   22  Migraine (346 90) (G43 909)   23  History of Need for prophylactic vaccination and inoculation against influenza (V04 81)    (Z23)   24  Opioid dependence (304 00) (F11 20)   25  Osteoarthritis of both hips, unspecified osteoarthritis type (715 95) (M16 0)   26  Osteoarthritis of knee (715 36) (M17 10)   27  Overactive bladder (596 51) (N32 81)   28  Pain, joint, hip (719 45) (M25 559)   29  Panic disorder without agoraphobia (300 01) (F41 0)   30  Post traumatic stress disorder (309 81) (F43 10)   31  Primary localized osteoarthritis of both knees (715 16) (M17 0)   32  Recent unexplained weight loss (783 21) (R63 4)   33  Right knee pain (719 46) (M25 561)   34  Sacroiliitis (720 2) (M46 1)   35  Tremor (781 0) (R25 1)   36  Urinary incontinence (788 30) (R32)   37  Vitamin D deficiency (268 9) (E55 9)    Current Meds   1  Acetaminophen-Codeine #3 300-30 MG Oral Tablet; TAKE 1 TABLET TWICE DAILY AS   NEEDED FOR PAIN;   Therapy: 08QLF2203 to (Evaluate:08Oct2017); Last Rx:09Aug2017 Ordered   2  AmLODIPine Besylate 10 MG Oral Tablet; TAKE 1 TABLET DAILY; Therapy: 23CXL1434 to (Evaluate:13May2018)  Requested for: 56NZO3049; Last   Rx:34Dan9971 Ordered   3  Aspirin 81 MG Oral Tablet Delayed Release; take 1 tablet by mouth daily; Therapy: 45WFR8751 to (Evaluate:38Azq7845)  Requested for: 55AUV0310; Last   Rx:59Knm9136 Ordered   4  Gabapentin 600 MG Oral Tablet; Take 1 po hs; Therapy: 35RCJ4724 to (Evaluate:08Oct2017)  Requested for: 12Dql7040; Last   Rx:27Wly8574 Ordered   5  LORazepam 2 MG Oral Tablet; TAKE 1 TABLET 3 TIMES DAILY; Therapy: 52XIE3038 to (Evaluate:22Nov2017); Last Rx:33Hqx7188 Ordered   6  Meloxicam 7 5 MG Oral Tablet; Take 1 tablet twice daily as needed; Therapy: 53MTB4294 to [phone])  Requested for: 37Yxc8865; Last   Rx:09Aug2017 Ordered   7  Morphine Sulfate ER 15 MG Oral Tablet Extended Release; take 1 po in am;   Therapy: 99TSY4142 to (Evaluate:69Msx5009); Last Rx:13Esl9512 Ordered   8  Omeprazole 20 MG Oral Capsule Delayed Release; TAKE 1 CAPSULE DAILY EVERY   MORNING BEFORE BREAKFAST;    Therapy: 85GCV0370 to (Evaluate:13May2018)  Requested for: 40LFJ5269; Last   Rx:34Nwr6447 Ordered   9  Primidone 250 MG Oral Tablet; TAKE 1 TABLET AT BEDTIME; Therapy: 15GND9603 to (Evaluate:73Gvo0569)  Requested for: 84VNJ3151; Last   Rx:49Rbo6813 Ordered   10  Propranolol HCl - 20 MG Oral Tablet; Take 1 tablet twice daily; Therapy: 93AJK0851 to (Evaluate:13May2018)  Requested for: 13PBB5801; Last    Rx:44Min4672 Ordered   11  RisperiDONE 2 MG Oral Tablet (RisperDAL); TAKE 1 TABLET Bedtime; Therapy: 76NDH0197 to (Wu Semen)  Requested for: 48Yyu6746; Last    Rx:96Kfu2445 Ordered   12  TiZANidine HCl - 2 MG Oral Tablet; Take 1 po tid prn spasms; Therapy: 87XQK7685 to (Evaluate:08Oct2017)  Requested for: 54Vfc8356; Last    Rx:51Xpo0323 Ordered   13  TraZODone HCl - 150 MG Oral Tablet; TAKE 1 TABLET Once At Bedtime; Therapy: 50XSO7548 to (Wu Semen)  Requested for: 94Rpu4957; Last    Rx:39Klr2195 Ordered   14  Venlafaxine HCl - 25 MG Oral Tablet; TAKE 1 TABLET 3 TIMES DAILY WITH FOOD; Therapy: 35MTB3365 to (Wu Semen)  Requested for: 11Zws2002; Last    Rx:60Tkb3934 Ordered   15  VESIcare 10 MG Oral Tablet; Take 1 tablet daily; Therapy: 97XIK9350 to ([facility])  Requested for: 73QSZ9305; Last    Rx:55Lfp8987 Ordered   16  Vitamin D3 1000 UNIT Oral Capsule; TAKE 1 CAPSULE DAILY; Therapy: 68MGR1484 to (Evaluate:17Wco7353)  Requested for: 13WJJ0101; Last    Rx:92Gpw8111 Ordered    Allergies    1  No Known Drug Allergies    Signatures   Electronically signed by :  Radha Urrutia, ; Aug  9 2017 11:48AM EST                       (Author)

## 2018-01-14 VITALS
DIASTOLIC BLOOD PRESSURE: 84 MMHG | SYSTOLIC BLOOD PRESSURE: 129 MMHG | HEART RATE: 62 BPM | HEIGHT: 61 IN | TEMPERATURE: 98.7 F | BODY MASS INDEX: 26.82 KG/M2 | WEIGHT: 142.06 LBS

## 2018-01-14 NOTE — PSYCH
Psych Med Mgmt    Appearance: was calm and cooperative, adequate hygiene and grooming and good eye contact  Observed mood: mood appropriate  Observed mood: affect appropriate  Speech: a normal rate and fluent  Thought processes: coherent/organized  Hallucinations: no hallucinations present  Thought Content: no delusions  Abnormal Thoughts: The patient has no suicidal thoughts and no homicidal thoughts  Orientation: The patient is oriented to person, place and time, oriented to person, oriented to place and oriented to time  Recent and Remote Memory: short term memory intact and long term memory intact  Attention Span And Concentration: concentration intact  Insight: Limited insight  Judgment: Her judgment was limited  Muscle Strength And Tone  Muscle strength and tone were normal    The patient is experiencing moderate to severe pain  Goes to pain management  Goals addressed in session: Medication Management       Treatment Recommendations: Continue current medications  Risks, Benefits And Possible Side Effects Of Medications: Risks, benefits, and possible side effects of medications explained to patient and patient verbalizes understanding  Discussed with patient Black Box warning on concurrent use of benzodiazepines and opioid medications including sedation, respiratory depression, coma and death  Patient understands the risk of treatment with benzodiazepines in addition to opioids and wants to continue taking those medications  Discussed with patient the risks of sedation, respiratory depression, impairment of ability to drive and potential for abuse and addiction related to treatment with benzodiazepine medications  The patient understands risk of treatment with benzodiazepine medications, agrees to not drive if feels impaired and agrees to take medications as prescribed          The patient has been filling controlled prescriptions on time as prescribed to [place] [facility] Monitoring program       She reports normal appetite, normal energy level, no weight change and normal number of sleep hours  Mood has been stable  She stated that her concerns now are her chronic pain and pain management not being effective  Also she had perforation of ear drum and has not been able to wear her hearing aids  No concerns or medication side effects  She still has an ICM  Stated daughter has a 3 yo child and is pregnant again   She has trouble with pharmacy getting the right medications at the right time  Assessment    1  Bipolar II disorder (296 89) (F31 81)   2  Generalized anxiety disorder (300 02) (F41 1)    Plan    1  LORazepam 2 MG Oral Tablet; TAKE 1 TABLET 3 TIMES DAILY; Do Not Fill   Before: 11Pfs0099   2  Venlafaxine HCl - 25 MG Oral Tablet; TAKE 1 TABLET 3 TIMES DAILY WITH   FOOD; Do Not Fill Before: 41Xqj4756    3  TraZODone HCl - 150 MG Oral Tablet; TAKE 1 TABLET Once At Bedtime; Do Not   Fill Before: 18Exf6266    4  RisperiDONE 2 MG Oral Tablet (RisperDAL); TAKE 1 TABLET Bedtime; Do Not   Fill Before: 36Mep9154    Review of Systems    Constitutional: as noted in HPI  Substance Abuse Hx    Substance Abuse History: Denies  Active Problems    1  Analgesic use (V58 69) (Z79 899)   2  Anxiety (300 00) (F41 9)   3  Arthralgia Of Shoulder Region (719 41)   4  Back pain (724 5) (M54 9)   5  Bereavement (V62 82) (Z63 4)   6  Bilateral hip pain (719 45) (M25 551,M25 552)   7  Bipolar II disorder (296 89) (F31 81)   8  Chronic bilateral low back pain with bilateral sciatica (724 2,724 3,338 29)   (M54 42,M54 41,G89 29)   9  Chronic pain of both knees (968 43,704 58) (M25 561,M25 562,G89 29)   10  Chronic pain syndrome (338 4) (G89 4)   11  Cognitive disorder (294 9) (F09)   12  Esophageal reflux (530 81) (K21 9)   13  Fatigue (780 79) (R53 83)   14  Female pelvic pain (625 9) (R10 2)   15  Fibromyalgia (729 1) (M79 7)   16   Generalized anxiety disorder (300 02) (F41 1)   17  Hypertension (401 9) (I10)   18  Hypokalemia (276 8) (E87 6)   19  Insomnia (780 52) (G47 00)   20  Lumbar spondylosis (721 3) (M47 816)   21  Major depressive disorder, recurrent episode, moderate degree (296 32) (F33 1)   22  Migraine (346 90) (G43 909)   23  History of Need for prophylactic vaccination and inoculation against influenza (V04 81)    (Z23)   24  Opioid dependence (304 00) (F11 20)   25  Osteoarthritis of both hips, unspecified osteoarthritis type (715 95) (M16 0)   26  Osteoarthritis of knee (715 36) (M17 10)   27  Overactive bladder (596 51) (N32 81)   28  Pain, joint, hip (719 45) (M25 559)   29  Panic disorder without agoraphobia (300 01) (F41 0)   30  Post traumatic stress disorder (309 81) (F43 10)   31  Primary localized osteoarthritis of both knees (715 16) (M17 0)   32  Recent unexplained weight loss (783 21) (R63 4)   33  Right knee pain (719 46) (M25 561)   34  Sacroiliitis (720 2) (M46 1)   35  Tremor (781 0) (R25 1)   36  Urinary incontinence (788 30) (R32)   37  Vitamin D deficiency (268 9) (E55 9)    Past Medical History    1  History of Acute nonsuppurative otitis media, unspecified laterality (381 00) (H65 199)   2  History of Bipolar disorder (296 80) (F31 9)   3  History of Encounter for screening colonoscopy (V76 51) (Z12 11)   4  Fibromyalgia (729 1) (M79 7)   5  History of H/O colonoscopy (V45 89) (Z98 890)   6  History of nausea and vomiting (V12 79) (Z87 898)   7  History of stroke (V12 54) (Z86 73)   8  History of urinary tract infection (V13 02) (Z87 440)   9  History of Left knee pain (719 46) (M25 562)   10  History of Memory loss (780 93) (R41 3)   11  History of Mixed Anxiety Disorder (300 00)   12  History of Need for hepatitis C screening test (V73 89) (Z11 59)   13  History of Need for prophylactic vaccination and inoculation against influenza (V04 81)    (Z23)   14  History of Need for Tdap vaccination (V06 1) (Z23)   15   Personal history of arthritis (V13 4) (Z87 39)   16  History of Previous Spontaneous Vaginal Delivery   17  History of Screening for lipoid disorders (V77 91) (Z13 220)   18  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)   19  History of Visit for screening mammogram (V76 12) (Z12 31)    The active problems and past medical history were reviewed and updated today  Allergies    1  No Known Drug Allergies    Current Meds   1  Acetaminophen-Codeine #3 300-30 MG Oral Tablet; TAKE 1 TABLET TWICE DAILY AS   NEEDED FOR PAIN;   Therapy: 05BPW0627 to (Evaluate:02Sfc8104); Last Rx:22Wzi7571 Ordered   2  AmLODIPine Besylate 10 MG Oral Tablet; TAKE 1 TABLET DAILY; Therapy: 44HOS1921 to (Evaluate:57Ecn0873)  Requested for: 25CNI7810; Last   Rx:18May2017 Ordered   3  Aspirin 81 MG Oral Tablet Delayed Release; take 1 tablet by mouth daily; Therapy: 92HYY5099 to (Evaluate:07Abt2757)  Requested for: 11ODR6326; Last   Rx:51Wlp9233 Ordered   4  Diclofenac Potassium 50 MG Oral Tablet; TAKE 1 TABLET TWICE DAILY AFTER MEALS; Therapy: 09BSS9516 to (Mary Ann Going)  Requested for: 66ILG4660; Last   Rx:09Jan2017 Ordered   5  Gabapentin 400 MG Oral Capsule; TAKE 1 CAPSULE AT BEDTIME; Therapy: 01FXB0745 to (Evaluate:54Kkm7514)  Requested for: 30EUD7590; Last   Rx:14Jun2017 Ordered   6  LORazepam 2 MG Oral Tablet; TAKE 1 TABLET 3 TIMES DAILY; Therapy: 49KRN6073 to (Evaluate:98Lpa7426); Last Rx:24May2017 Ordered   7  Meloxicam 7 5 MG Oral Tablet; Take 1 tablet twice daily as needed; Therapy: 93MYX3115 to (Evaluate:17Pbw2320)  Requested for: 21Jun2017; Last   DE:73MLQ3172 Ordered   8  Morphine Sulfate ER 15 MG Oral Tablet Extended Release; take 1 po in am;   Therapy: 56BBZ8296 to (Evaluate:38Mcg8414); Last Rx:19Jun2017 Ordered   9  Omeprazole 20 MG Oral Capsule Delayed Release; TAKE 1 CAPSULE DAILY EVERY   MORNING BEFORE BREAKFAST;    Therapy: 18MEI8722 to (Evaluate:62Phk0179)  Requested for: 12ZDX0767; Last Rx:94Jel1350 Ordered   10  Potassium Chloride ER 10 MEQ Oral Tablet Extended Release; TAKE 1 TABLET DAILY; Therapy: 14TOR0296 to (Evaluate:22Tpe0975)  Requested for: 33KOO7071; Last    Rx:56Twm0204 Ordered   11  Primidone 250 MG Oral Tablet; TAKE 1 TABLET AT BEDTIME; Therapy: 14IKU0262 to (Evaluate:50Tja6668)  Requested for: 68NDK7764; Last    Rx:67Dfq0851 Ordered   12  Propranolol HCl - 20 MG Oral Tablet; Take 1 tablet twice daily; Therapy: 85CFD8177 to (Evaluate:15Qfz4482)  Requested for: 21HJH1792; Last    Rx:31Buw7018 Ordered   13  RisperiDONE 2 MG Oral Tablet; TAKE 1 TABLET Bedtime; Therapy: 68UZU0063 to (Evaluate:16Qtj3398)  Requested for: 95GQD4494; Last    Rx:05Oxc7866 Ordered   14  TiZANidine HCl - 2 MG Oral Tablet; Take 1 po tid prn spasms; Therapy: 03PQA9433 to (Evaluate:51Vna3484)  Requested for: 01LSF0770; Last    Rx:86Yqq4092 Ordered   15  TraZODone HCl - 150 MG Oral Tablet; TAKE 1 TABLET Once At Bedtime; Therapy: 14TFS6194 to (Evaluate:32Mwx4254)  Requested for: 69UMM3366; Last    Rx:84Dio0060 Ordered   16  Venlafaxine HCl - 25 MG Oral Tablet; TAKE 1 TABLET 3 TIMES DAILY WITH FOOD; Therapy: 10HFU3673 to (Evaluate:01Ult7506)  Requested for: 40RPC8731; Last    Rx:94Lsz6237 Ordered   17  VESIcare 10 MG Oral Tablet; Take 1 tablet daily; Therapy: 24LCN6668 to (Sally Hamilton)  Requested for: 36YPS1378; Last    Rx:99Wvo2099 Ordered   18  Vitamin D3 1000 UNIT Oral Capsule; TAKE 1 CAPSULE DAILY; Therapy: 14FUD2934 to ([id number])  Requested for: 96YCC7049; Last    Rx:00Mdy7619 Ordered    The medication list was reviewed and updated today  Family Psych History  Mother    1  Family history of Colon Cancer (V16 0)  Father    2  Family history of Alzheimer's disease (V17 2) (Z82 0)   3  Family history of cerebrovascular accident (V17 1) (Z82 3)  Son    4  Family history of seizures (V19 8) (Z84 89)  Brother    5  Family history of Colon cancer   6   Family history of bipolar disorder (V17 0) (Z81 8)  Maternal Aunt    7  Family history of Breast Cancer (V16 3)  Unknown    8  Family history of cardiac disorder (V17 49) (Z82 49)   9  Family history of diabetes mellitus (V18 0) (Z83 3)   10  Family history of hypertension (V17 49) (Z82 49)    The family history was reviewed and updated today  Social History    · Being A Social Drinker   · Bereavement (V62 82) (Z63 4)   · Daily caffeine consumption, 6-8 servings a day   ·    · Education Level: Graduate school   · Lives in [place]   · Never A Smoker   · No drug use  The social history was reviewed and updated today  The social history was reviewed and is unchanged  End of Encounter Meds    1  Aspirin 81 MG Oral Tablet Delayed Release; take 1 tablet by mouth daily; Therapy: 00YDK9642 to (Evaluate:32Ngh2021)  Requested for: 36EZB3916; Last   Rx:26Fkx6312 Ordered    2  Morphine Sulfate ER 15 MG Oral Tablet Extended Release; take 1 po in am;   Therapy: 02QHC7458 to (Evaluate:43Hkf3107); Last Rx:19Jun2017 Ordered    3  TiZANidine HCl - 2 MG Oral Tablet; Take 1 po tid prn spasms; Therapy: 43ZHQ9554 to (Evaluate:99Vem7831)  Requested for: 67PZI0545; Last   Rx:40Vwy6250 Ordered    4  Omeprazole 20 MG Oral Capsule Delayed Release; TAKE 1 CAPSULE DAILY EVERY   MORNING BEFORE BREAKFAST; Therapy: 15AEP0228 to (Evaluate:44Wtg9493)  Requested for: 79XNX8198; Last   Rx:70Tng9454 Ordered    5  LORazepam 2 MG Oral Tablet; TAKE 1 TABLET 3 TIMES DAILY; Therapy: 78TFR0075 to (Evaluate:22Nov2017); Last Rx:45Nui2350 Ordered   6  Venlafaxine HCl - 25 MG Oral Tablet; TAKE 1 TABLET 3 TIMES DAILY WITH FOOD; Therapy: 83JCG4601 to (Susan Little)  Requested for: 26Uhc5264; Last   Rx:14Msy3005 Ordered    7  AmLODIPine Besylate 10 MG Oral Tablet; TAKE 1 TABLET DAILY; Therapy: 36IZS0144 to (Evaluate:21Ama1674)  Requested for: 11ALC7318; Last   Rx:18May2017 Ordered    8   Potassium Chloride ER 10 MEQ Oral Tablet Extended Release; TAKE 1 TABLET DAILY; Therapy: 45MAH7331 to (Evaluate:14Eev8445)  Requested for: 16PQC8058; Last   Rx:93Jcs3068 Ordered    9  TraZODone HCl - 150 MG Oral Tablet; TAKE 1 TABLET Once At Bedtime; Therapy: 26GTF8730 to (Gricelda Castor)  Requested for: 82Ddp0399; Last   Rx:73Uwt5763 Ordered    10  Gabapentin 400 MG Oral Capsule; TAKE 1 CAPSULE AT BEDTIME; Therapy: 68YAJ8422 to (Evaluate:59Ywu1503)  Requested for: 08YDV7891; Last    Rx:14Jun2017 Ordered    11  Propranolol HCl - 20 MG Oral Tablet; Take 1 tablet twice daily; Therapy: 30RFM6037 to (Evaluate:48Ckk3055)  Requested for: 61TGP3016; Last    Rx:30Xdp8754 Ordered    12  Acetaminophen-Codeine #3 300-30 MG Oral Tablet; TAKE 1 TABLET TWICE DAILY AS    NEEDED FOR PAIN;    Therapy: 99WVV9326 to (Evaluate:03Fcz0113); Last Rx:82Qoq9084 Ordered    13  Meloxicam 7 5 MG Oral Tablet; Take 1 tablet twice daily as needed; Therapy: 87ARK5281 to (Evaluate:54Amj2714)  Requested for: 21Jun2017; Last    FB:63TYO1453 Ordered    14  VESIcare 10 MG Oral Tablet; Take 1 tablet daily; Therapy: 52TLK7890 to (Marija Fitting)  Requested for: 76MQE1284; Last    Rx:17Chh0698 Ordered    15  Diclofenac Potassium 50 MG Oral Tablet; TAKE 1 TABLET TWICE DAILY AFTER MEALS; Therapy: 05OHY7971 to ([phone])  Requested for: 34KJB9481; Last    Rx:09Jan2017 Ordered    16  RisperiDONE 2 MG Oral Tablet (RisperDAL); TAKE 1 TABLET Bedtime; Therapy: 93GQL3773 to (Gricelda Castor)  Requested for: 14Jmo1844; Last    Rx:36Mzd8713 Ordered    17  Primidone 250 MG Oral Tablet; TAKE 1 TABLET AT BEDTIME; Therapy: 75NPQ3760 to (Evaluate:13Vjp8714)  Requested for: 03RIB2726; Last    Rx:36Lpq4120 Ordered    18  Vitamin D3 1000 UNIT Oral Capsule; TAKE 1 CAPSULE DAILY;     Therapy: 09TJQ6876 to (Evaluate:87Tkv1514)  Requested for: 83PHJ9699; Last    YV:00HGN2221 Ordered    Future Appointments    Date/Time Provider Specialty Site   08/08/2017 09:00 AM [facility] SPINE   09/15/2017 09:40 AM Johnathon Mcarthur DO Orthopedic Surgery  [address] [facility]     Signatures   Electronically signed by : ASHLEY Almeida ; Aug  3 2017  4:03PM EST                       (Author)

## 2018-01-14 NOTE — RESULT NOTES
Message   Recorded as Task   Date: 10/03/2017 02:12 PM, Created By: Brett Rutledge   Task Name: Miscellaneous   Assigned To: [facility] [place] [facility],Team   Regarding Patient: Jatin Shah, Status: Active   CommentIgnacia Nest - 03 Oct 2017 2:12 PM     TASK CREATED  Patient's appt was bumped and she will run out of her medications before then, 10/13  Will need refills on Gabapentin and Tizanidine  Pt has about 5 or 6 of each left  Pt uses [facility] pharmacy [facility] file [facility]  Pt can be reached at [phone]  Carolina Reno - 38 Oct 2017 2:41 PM     TASK EDITED  [address] [place] [address] to advise  thanks   Zaire Chen - 03 Oct 2017 3:14 PM     TASK REPLIED TO: Previously Assigned To Zaire Chen                      Refills sent to pharmacy   Carolina Reno - 03 Oct 2017 3:30 PM     TASK EDITED  Pt  aware and appreciative          Signatures   Electronically signed by : Gerianne Goltz, ; Oct  3 2017  3:30PM EST                       (Author)

## 2018-01-14 NOTE — MISCELLANEOUS
Message   Recorded as Task   Date: 11/01/2016 09:00 AM, Created By: Jeni Nascimento   Task Name: Follow Up   Assigned To: [facility] [place] [facility],Team   Regarding Patient: Burgess Bolivar, Status: Active   Comment:    Kirill Starks - 01 Nov 2016 9:00 AM     TASK CREATED  Caller: Self; General Medical Question; [phone] (Home)  Message left w/ ans svc 10/31/2016 @ 1810 pm    "Pt is in alot of pain, rx was suppose to be sent to the pharmacy [facility] "    10/31/2016 @ 0937 - "connected on call Dr [facility] w/ Pt 10/31/2016"   Jenaro Fonseca - 01 Nov 2016 9:11 AM     TASK REPLIED TO: Previously Assigned To [facility] [place] [facility],Team  Can you please call her pharmacy ASAP to make sure they have her Diclofenac script as Dr Pedro Davidson Prescribed yesterday? THank you  Kirill Starks - 01 Nov 2016 9:27 AM     TASK EDITED  *** FYI ***  [facility] no [facility] for [facility]ac received  Called in diclofenac as written by Dr Pedro Davidson  S/w pt, advised of above  pt verbalized understanding  Will pu medication today  Jenaro Fonseca - 01 Nov 2016 10:40 AM     TASK REPLIED TO: Previously Assigned To Jenaro Fonseca  Provider aware  Thank you  Kirill Starks - 01 Nov 2016 10:49 AM     TASK REASSIGNED: Previously Assigned To [facility] [place] [facility]   *** Jacqui Talley - 01 Nov 2016 11:06 AM     TASK REPLIED TO: Previously Assigned To Demi Austin                      Physician aware thank you        Active Problems    1  Anxiety (300 00) (F41 9)   2  Arthralgia Of Shoulder Region (719 41)   3  Back pain (724 5) (M54 9)   4  Bipolar II disorder (296 89) (F31 81)   5  Chronic pain of both knees (758 77,602 58) (M25 561,M25 562,G89 29)   6  Cognitive disorder (294 9) (F09)   7  Depression (311) (F32 9)   8  Esophageal reflux (530 81) (K21 9)   9  Fatigue (780 79) (R53 83)   10  Female pelvic pain (625 9) (R10 2)   11  Fibromyalgia (729 1) (M79 7)   12  Generalized anxiety disorder (300 02) (F41 1)   13  Hypertension (401 9) (I10)   14  Hypokalemia (276 8) (E87 6)   15  Insomnia (780 52) (G47 00)   16  Left knee pain (719 46) (M25 562)   17  Lumbar spondylosis (721 3) (M47 816)   18  Major depressive disorder, recurrent episode, moderate degree (296 32) (F33 1)   19  Migraine (346 90) (G43 909)   20  History of Need for prophylactic vaccination and inoculation against influenza (V04 81)    (Z23)   21  Osteoarthritis of both hips, unspecified osteoarthritis type (715 95) (M16 0)   22  Osteoarthritis of knee (715 36) (M17 9)   23  Overactive bladder (596 51) (N32 81)   24  Pain, joint, hip (719 45) (M25 559)   25  Panic disorder without agoraphobia (300 01) (F41 0)   26  Post traumatic stress disorder (309 81) (F43 10)   27  Recent unexplained weight loss (783 21) (R63 4)   28  Right knee pain (719 46) (M25 561)   29  Tremor (781 0) (R25 1)   30  Urinary incontinence (788 30) (R32)   31  Vitamin D deficiency (268 9) (E55 9)    Current Meds   1  AmLODIPine Besylate 10 MG Oral Tablet; TAKE 1 TABLET DAILY; Therapy: 14VGR0556 to (Evaluate:01Jun2017)  Requested for: 11CZV0004; Last   Rx:06Jun2016 Ordered   2  Aspirin 81 MG TABS; TAKE 1 TABLET DAILY; Therapy: 48PEJ5758 to (Evaluate:70Wod7780)  Requested for: 11Zsn1538; Last   Rx:98Fux2087 Ordered   3  Baclofen 10 MG Oral Tablet; TAKE 1 TABLET Bedtime; Therapy: 98Sbc6528 to (Evaluate:93Sem0593)  Requested for: 24Oct2016; Last   Rx:39Mas1740 Ordered   4  Diclofenac Potassium 50 MG Oral Tablet; TAKE 1 TABLET TWICE DAILY AFTER MEALS; Therapy: 87ESQ5647 to (Lisa Fletcher)  Requested for: 31Oct2016; Last   Rx:73Aht2665 Ordered   5  Ibuprofen 800 MG Oral Tablet; TAKE 1 TABLET 3 TIMES DAILY WITH FOOD AS   NEEDED; Therapy: 91WUN7287 to (Evaluate:08Mku6608)  Requested for: 98Jds8952; Last   Rx:41Tep8024 Ordered   6  LORazepam 2 MG Oral Tablet; TAKE 1 TABLET 3 TIMES DAILY; Therapy: 89AYG5927 to (Evaluate:20Nov2016); Last Rx:84Mjj5610 Ordered   7   Omeprazole 20 MG Oral Capsule Delayed Release; TAKE 1 CAPSULE DAILY EVERY   MORNING BEFORE BREAKFAST; Therapy: 88MCA4158 to (Evaluate:87Kxu5757)  Requested for: 96Dmj0963; Last   Rx:52Ylw6224 Ordered   8  Primidone 250 MG Oral Tablet; TAKE 1 TABLET AT BEDTIME; Therapy: 74FQO9038 to (Evaluate:18Sft1429)  Requested for: 24Oct2016; Last   Rx:62Mmq3740 Ordered   9  Propranolol HCl - 20 MG Oral Tablet; Take 1 tablet twice daily; Therapy: 92KJM1420 to (Evaluate:01Jun2017)  Requested for: 13YGK2665; Last   XS:27TFU9185 Ordered   10  RisperiDONE 2 MG Oral Tablet (RisperDAL); TAKE 1 TABLET Bedtime; Therapy: 16IHO0048 to (Evaluate:99Xsb4056)  Requested for: 00Jqv9932; Last    Rx:57Qhi0084 Ordered   11  TraMADol HCl - 50 MG Oral Tablet; TAKE 1 TABLET 3 TIMES DAILY AS NEEDED; Therapy: 19KZL4478 to (Evaluate:01Zry9333); Last Rx:63Bxu4123 Ordered   12  TraZODone HCl - 150 MG Oral Tablet; TAKE 1 TABLET Once At Bedtime; Therapy: 30IHG4329 to (Evaluate:98Wgq5369)  Requested for: 82Wbg6018; Last    Rx:27Bkg6408 Ordered   13  Venlafaxine HCl - 25 MG Oral Tablet; TAKE 1 TABLET 3 TIMES DAILY WITH FOOD; Therapy: 37HMT2625 to (Evaluate:90Ppw8599)  Requested for: 90Dtf3841; Last    Rx:41Olh2751 Ordered   14  VESIcare 10 MG Oral Tablet; Take 1 tablet daily; Therapy: 01XXX4971 to (Evaluate:39Dma6907)  Requested for: 03XDZ7183; Last    Rx:83Xeg2187 Ordered   15  VESIcare 10 MG Oral Tablet; Take 1 tablet daily; Therapy: 69ZAR0569 to (Evaluate:22Jun2016); Last Rx:02Sgy9882 Ordered   16  Vitamin D3 1000 UNIT Oral Capsule; TAKE 1 CAPSULE DAILY; Therapy: 18DYJ6788 to (Evaluate:89Wkv8745)  Requested for: 96Qfo2718; Last    Rx:48Lal1666 Ordered    Allergies    1   No Known Drug Allergies    Signatures   Electronically signed by : Brooke Smith, ; Nov 1 2016 11:30AM EST                       (Author)

## 2018-01-15 VITALS
TEMPERATURE: 98.4 F | DIASTOLIC BLOOD PRESSURE: 78 MMHG | WEIGHT: 135.75 LBS | HEIGHT: 61 IN | HEART RATE: 61 BPM | SYSTOLIC BLOOD PRESSURE: 110 MMHG | BODY MASS INDEX: 25.63 KG/M2

## 2018-01-15 NOTE — PSYCH
Progress Note  Psychotherapy Provided [facility]: Individual Psychotherapy 50 minutes provided today  Goals addressed in session:   Goal #1  D: " I'm sad because I won't see my brother ever again---he  at the end of April  "  "And I went to [place] to be at the [facility] to help my sister to get my brother's girlfriend out of the house---it's really my father's [address], and now my father who is 80, is going to live there    He'll have [employer] to help him because he is getting Alzheimer's "   " One thing I look forward to, is I'm going on a trip to [place] with my daughter this Friday  Ana Paula Dee I will help my daughter by watching my [employer], who is 3years old already "   "My Sumav[employer] makes me laugh    But I wish I could communicate better with people  Ana Paula Dee I can't remember words well   "    Pt returns for psychotherapy, after trying to [place] [address] with her close brother Ever' death of Cancer  Pt was in [place] for a while, where he passed away  Pt has a sad affect and mood  Denies [address]  Pt can only sleep with the help of her sleeping medication, which she takes as prescribed  Her appetite is fair,and pt states she is on a healthy diet, eats no meat lately, but does have protein---and she states she lost several pounds, toward her healthy weight  Pt's PHQ9= 13 today, moderate depression  Pt denies any hypomanic tendencies, "in a long time , now " Pt processes her Grief, re: death of her brother Mary Arisa  Pt was relieved to be able to attend his Alicia Deleon in [place] family helped her to pay for the trip  Grief Therapy is given today  Pt also dealt with the fact that her sister had to file a lawsuit to get the  brother's girlfriend out of his house and out of using his Social Security money---they had to file a fraud case,and the Court agreed with sister and with pt/ family   Pt is relieved that the house,which is her father's house, will be repaired and will house her father, 80, again, after he is done with some medical care  Pt also processes her thoughts, feelings,and behaviors, re: a positive in her life---her 3 yr old grandson "Alma Nava", whom she helps babysit  Pt looks forward to a trip with her daughter/ and daughter's [employer] business, to [place] this Friday June 9, for 2 weeks  Pt to babysit her grandson there at the hotel  Pt thinks of her blessings, and tries not to focus on what she cannot do anymore,since a stroke she allegedly suffered years ago, (out of state ) We completed pt's new Treatment Plan today  Active Listening, support,and validation also given in session  Pt has started morphine pills, Rx'd by Pain Management) for her Chronic Pain (Pain =8 today, Left hip and leg,and some in her knees, and back ) Pt states she takes all meds as prescribed by her doctors,and her family helps her to organize her meds in a container weekly  A: Bipolar II Disorder, F31 81; Generalized Anxiety Disorder, F41 10; Bereavement, Z63 4; and Chronic Painand other medical conditions  P: Continue new Treatment Plan, Meds,and Psychotherapy  Pain Scale and Suicide Risk [facility]: Current Pain Assessment: moderate to severe   On a scale of 0 to 10, the patient rates current pain at 8   Current suicide risk is low   Behavioral Health Treatment Plan [address] [place] [address] [address]: Diagnosis and Treatment Plan explained to patient, patient relates understanding diagnosis and is agreeable to Treatment Plan  Results/Data  PHQ-9 Adult Depression Screening 07Jun2017 11:04AM Pat Casillas     Test Name Result Flag Reference   PHQ-9 Adult Depression Score 13     Over the last two weeks, how often have you been bothered by any of the following problems?   Little interest or pleasure in doing things: More than half the days - 2  Feeling down, depressed, or hopeless: Several days - 1  Trouble falling or staying asleep, or sleeping too much: Several days - 1  Feeling tired or having little energy: Several days - 1  Poor appetite or over eating: Several days - 1  Feeling bad about yourself - or that you are a failure or have let yourself or your family down: More than half the days - 2  Trouble concentrating on things, such as reading the newspaper or watching television: Nearly every day - 3  Moving or speaking so slowly that other people could have noticed  Or the opposite -  being so fidgety or restless that you have been moving around a lot more than usual: More than half the days - 2  Thoughts that you would be better off dead, or of hurting yourself in some way: Not at all - 0   PHQ-9 Adult Depression Screening Positive     PHQ-9 Difficulty Level Somewhat difficult     PHQ-9 Severity Moderate Depression         Assessment    1  Bipolar II disorder (296 89) (F31 81)   2  Generalized anxiety disorder (300 02) (F41 1)   3   Bereavement (N20 61) (Z63 4)    Signatures   Electronically signed by : Washington Vaughan MSAPRNPMHCNS-BC; Jun 7 2017 12:28PM EST                       (Author)

## 2018-01-15 NOTE — RESULT NOTES
Message   Recorded as Task   Date: 01/18/2017 01:32 PM, Created By: Luis Fernando Alexander   Task Name: Care Coordination   Assigned To: [facility]   Regarding Patient: Brinda Moore, Status: In Progress   Comment:    Blank Scanlon - 18 Jan 2017 1:32 PM     TASK CREATED  Pt at office for injection, brought a letter from her insurance [employer] stating that they supplied her with a 30day supply of diclofenac but it is nonformulary  They are requesting to prescribe a formulary option or send a prior authorization  Blank Scanlon - 18 Jan 2017 1:33 PM     TASK EDITED   Asad Boogie - 18 Jan 2017 4:32 PM     TASK REPLIED TO: Previously Assigned To Asad Boogie             We can try for a prior authorization for her  I would like her to trial the medication for a little bit longer to see if it is effective  If it is indeed effective we will do the prior authorization attempt   Desirae Renoy - 19 Jan 2017 8:15 AM     TASK EDITED  Attempted to call the pt  and left a detailed mom in regards to the previous task  Pt  to CB in a couple of weeks after she trials the diclofenac  Carolina Reno - 19 Jan 2017 8:15 AM     TASK IN PROGRESS   Carolina Reno - 19 Jan 2017 10:22 AM     TASK EDITED  S/w the pt  after receiving a vmlom from 6 today stating she made a mistake because she has enough meds for a month  She will give us a weeks notice to get the PA if she finds the diclofenac          Signatures   Electronically signed by : Kalina Friedman, ; Jan 19 2017 10:23AM EST                       (Author)

## 2018-01-15 NOTE — MISCELLANEOUS
Message   Recorded as Task   Date: 11/02/2016 02:00 PM, Created By: Jessica Braga   Task Name: Follow Up   Assigned To: [facility]   Regarding Patient: Didier Dobson, Status: Active   Comment:    Blank Scanlon - 02 Nov 2016 2:00 PM     TASK CREATED  Caller: Self; General Medical Question; [phone] (Home)  Pt lmom stating that she is having a hip injection on 11/9/16 and wanted the staff to know that she is taking aspirin  Also pt would like a call back as she is nervous and has some questions  Blank Scanlon - 16 Nov 2016 2:25 PM     TASK EDITED   Spoke to pt instructed her that she does not need to hold her asa for a right hip intra-articular injection  Pt inquired on the process on 11/9/16  Informed pt that the nurse will be checking her vs and asking some questions then the doctor will meet with her and she anabel go into the procedure room for the injection which will take approx 10-15 min and then she will return to the nurse for vs to be checked again and review discharged instructions  Pt appreciative of information states she is nervous  Pt instructed to call with any further questions/concerns  Maddy Grant - 15 Nov 2016 3:13 PM     TASK REPLIED TO: Previously Assigned To Maddy Grant                      Physician aware thank you        Active Problems    1  Anxiety (300 00) (F41 9)   2  Arthralgia Of Shoulder Region (719 41)   3  Back pain (724 5) (M54 9)   4  Bipolar II disorder (296 89) (F31 81)   5  Chronic pain of both knees (827 15,161 97) (M25 561,M25 562,G89 29)   6  Cognitive disorder (294 9) (F09)   7  Depression (311) (F32 9)   8  Esophageal reflux (530 81) (K21 9)   9  Fatigue (780 79) (R53 83)   10  Female pelvic pain (625 9) (R10 2)   11  Fibromyalgia (729 1) (M79 7)   12  Generalized anxiety disorder (300 02) (F41 1)   13  Hypertension (401 9) (I10)   14  Hypokalemia (276 8) (E87 6)   15  Insomnia (780 52) (G47 00)   16   Left knee pain (719 46) (M25 562)   17  Lumbar spondylosis (721 3) (M47 816)   18  Major depressive disorder, recurrent episode, moderate degree (296 32) (F33 1)   19  Migraine (346 90) (G43 909)   20  History of Need for prophylactic vaccination and inoculation against influenza (V04 81)    (Z23)   21  Osteoarthritis of both hips, unspecified osteoarthritis type (715 95) (M16 0)   22  Osteoarthritis of knee (715 36) (M17 9)   23  Overactive bladder (596 51) (N32 81)   24  Pain, joint, hip (719 45) (M25 559)   25  Panic disorder without agoraphobia (300 01) (F41 0)   26  Post traumatic stress disorder (309 81) (F43 10)   27  Recent unexplained weight loss (783 21) (R63 4)   28  Right knee pain (719 46) (M25 561)   29  Tremor (781 0) (R25 1)   30  Urinary incontinence (788 30) (R32)   31  Vitamin D deficiency (268 9) (E55 9)    Current Meds   1  AmLODIPine Besylate 10 MG Oral Tablet; TAKE 1 TABLET DAILY; Therapy: 74XVC2797 to (Evaluate:01Jun2017)  Requested for: 13FYA7973; Last   Rx:06Jun2016 Ordered   2  Aspirin 81 MG TABS; TAKE 1 TABLET DAILY; Therapy: 80MTW9532 to (Evaluate:22Djo2887)  Requested for: 93Vdq2747; Last   Rx:44Kxv1731 Ordered   3  Baclofen 10 MG Oral Tablet; TAKE 1 TABLET Bedtime; Therapy: 43Sqk5055 to (Evaluate:31Uzz7046)  Requested for: 24Oct2016; Last   Rx:24Oct2016 Ordered   4  Diclofenac Potassium 50 MG Oral Tablet; TAKE 1 TABLET TWICE DAILY AFTER MEALS; Therapy: 02DQY1011 to (Rafaela Champagne)  Requested for: 31Oct2016; Last   Rx:31Oct2016 Ordered   5  Ibuprofen 800 MG Oral Tablet; TAKE 1 TABLET 3 TIMES DAILY WITH FOOD AS   NEEDED; Therapy: 70GGH7861 to (Evaluate:94Cgc8122)  Requested for: 59Enx4068; Last   Rx:12Dap4348 Ordered   6  LORazepam 2 MG Oral Tablet; TAKE 1 TABLET 3 TIMES DAILY; Therapy: 11JMW7810 to (Evaluate:64Tvv9077); Last Rx:60Sdr0183 Ordered   7  Omeprazole 20 MG Oral Capsule Delayed Release; TAKE 1 CAPSULE DAILY EVERY   MORNING BEFORE BREAKFAST;    Therapy: 01AFU6628 to (Evaluate:62Qgp2103)  Requested for: 87Exg9471; Last   Rx:26Yja6277 Ordered   8  Primidone 250 MG Oral Tablet; TAKE 1 TABLET AT BEDTIME; Therapy: 46ZGQ5470 to (Evaluate:82Kaa8080)  Requested for: 97Lcb6595; Last   Rx:09Fsk7945 Ordered   9  Propranolol HCl - 20 MG Oral Tablet; Take 1 tablet twice daily; Therapy: 82PFS7403 to (Evaluate:01Jun2017)  Requested for: 84ABD5999; Last   EA:11YQO6882 Ordered   10  RisperiDONE 2 MG Oral Tablet (RisperDAL); TAKE 1 TABLET Bedtime; Therapy: 14GJA7104 to (Evaluate:08Wbd5485)  Requested for: 87Pdh1031; Last    Rx:99Hgv9685 Ordered   11  TraMADol HCl - 50 MG Oral Tablet; TAKE 1 TABLET 3 TIMES DAILY AS NEEDED; Therapy: 01OPR5507 to (Evaluate:18Npq7404); Last Rx:69Hap5252 Ordered   12  TraZODone HCl - 150 MG Oral Tablet; TAKE 1 TABLET Once At Bedtime; Therapy: 10UDD3221 to (Evaluate:36Moj1959)  Requested for: 98Rci9797; Last    Rx:11Eiv1659 Ordered   13  Venlafaxine HCl - 25 MG Oral Tablet; TAKE 1 TABLET 3 TIMES DAILY WITH FOOD; Therapy: 40TJX9439 to (Evaluate:94Zby9528)  Requested for: 05Ggz2964; Last    Rx:22Gcf3575 Ordered   14  VESIcare 10 MG Oral Tablet; Take 1 tablet daily; Therapy: 16ZXQ0888 to (Evaluate:36Ibg5160)  Requested for: 62TSY6807; Last    Rx:88Gqn6219 Ordered   15  VESIcare 10 MG Oral Tablet; Take 1 tablet daily; Therapy: 19JRK4703 to (Evaluate:22Jun2016); Last Rx:56Olv6051 Ordered   16  Vitamin D3 1000 UNIT Oral Capsule; TAKE 1 CAPSULE DAILY; Therapy: 95UNX4970 to (Evaluate:90Jcy5180)  Requested for: 83Cib4106; Last    Rx:84Ykr4534 Ordered    Allergies    1   No Known Drug Allergies    Signatures   Electronically signed by : Keya Moreno RN; Nov 2 2016  3:24PM EST                       (Author)

## 2018-01-15 NOTE — RESULT NOTES
Message   Recorded as Task   Date: 2017 09:38 AM, Created By: Hilaria Montemayor   Task Name: Miscellaneous   Assigned To: [facility]   Regarding Patient: Garry Joyce, Status: Active   Comment:    Joseline Mathis - 29 Mar 2017 9:38 AM     TASK CREATED  Pt called stating she was given muscle relaxers but is still in pain  She is asking if this is how it is going to be -- always be in pain  Pls call pt at [phone]  Carolina Reno - 29 Mar 2017 10:18 AM     TASK EDITED  S/w the pt  and reiterated the previous office visit  Reviewed the medications she continues to take  She stated she does not feel like the medications are really helping with the pain  Continues with pain in her back, radiating down her hips to her buttocks and her legs feel stiff  She wanted to know if this is something she has to continue to deal with  Reviewed that she has fibromyalgia pain and will have to do more adjustments to modify  her activities depending on her pain level  DG to advise if anything else and emotional support was ongoing throughout the discussion  Jenaro Fonseca - 29 Mar 2017 10:45 AM     TASK REPLIED TO: Previously Assigned To Jenaro Fonseca  Please advise her that we did discuss this thoroughly and yes she is going to have some kind of pain the rest of her life as we discussed at her OV  We had discussed the Butrans patch, but we decided to hold off on the patch because of possible cost and the fact that her brother is dying and she may need to go to [place] for the   We can discuss further at her next OV, but she needs to manage with the regimen she has and try rest, ice, heat, as instructed  Thank you  Carolina Reno - 29 Mar 2017 11:40 AM     TASK EDITED  S/w the pt  and she is aware          Signatures   Electronically signed by : Beth Tello, ; Mar 29 2017 11:40AM EST                       (Author)

## 2018-01-16 NOTE — MISCELLANEOUS
Message  Patient called regarding her eye exam  She was told to have her eye doctor fax all the information over to us after her eye exam so we can renew her vesicare once we know the results  Active Problems    1  Analgesic use (V58 69) (Z79 899)   2  Anxiety (300 00) (F41 9)   3  Arthralgia Of Shoulder Region (719 41)   4  Back pain (724 5) (M54 9)   5  Bipolar II disorder (296 89) (F31 81)   6  Chronic bilateral low back pain with bilateral sciatica (724 2,724 3,338 29)   (M54 42,M54 41,G89 29)   7  Chronic pain of both knees (053 39,106 35) (M25 561,M25 562,G89 29)   8  Chronic pain syndrome (338 4) (G89 4)   9  Cognitive disorder (294 9) (F09)   10  Esophageal reflux (530 81) (K21 9)   11  Fatigue (780 79) (R53 83)   12  Female pelvic pain (625 9) (R10 2)   13  Fibromyalgia (729 1) (M79 7)   14  Generalized anxiety disorder (300 02) (F41 1)   15  Hypertension (401 9) (I10)   16  Hypokalemia (276 8) (E87 6)   17  Insomnia (780 52) (G47 00)   18  Lumbar spondylosis (721 3) (M47 816)   19  Major depressive disorder, recurrent episode, moderate degree (296 32) (F33 1)   20  Migraine (346 90) (G43 909)   21  History of Need for prophylactic vaccination and inoculation against influenza (V04 81)    (Z23)   22  Opioid dependence (304 00) (F11 20)   23  Osteoarthritis of both hips, unspecified osteoarthritis type (715 95) (M16 0)   24  Osteoarthritis of knee (715 36) (M17 10)   25  Overactive bladder (596 51) (N32 81)   26  Pain, joint, hip (719 45) (M25 559)   27  Panic disorder without agoraphobia (300 01) (F41 0)   28  Post traumatic stress disorder (309 81) (F43 10)   29  Recent unexplained weight loss (783 21) (R63 4)   30  Right knee pain (719 46) (M25 561)   31  Sacroiliitis (720 2) (M46 1)   32  Tremor (781 0) (R25 1)   33  Urinary incontinence (788 30) (R32)   34  Vitamin D deficiency (268 9) (E55 9)    Current Meds   1   Acetaminophen-Codeine #3 300-30 MG Oral Tablet; TAKE 1 TABLET TWICE DAILY AS NEEDED FOR PAIN;   Therapy: 92WHL2384 to (Evaluate:08Aug2017); Last Rx:10May2017 Ordered   2  AmLODIPine Besylate 10 MG Oral Tablet; TAKE 1 TABLET DAILY; Therapy: 95WNV8653 to (Evaluate:13May2018)  Requested for: 21YIP1568; Last   Rx:18May2017 Ordered   3  Aspirin 81 MG Oral Tablet Delayed Release; take 1 tablet by mouth daily; Therapy: 22EGG0923 to (Evaluate:19Feb2018)  Requested for: 95MRB7713; Last   Rx:24Feb2017 Ordered   4  Diclofenac Potassium 50 MG Oral Tablet; TAKE 1 TABLET TWICE DAILY AFTER MEALS; Therapy: 55WGM8535 to (0431 35 06 90)  Requested for: 11EPJ3145; Last   Rx:09Jan2017 Ordered   5  Gabapentin 400 MG Oral Capsule; TAKE 1 CAPSULE AT BEDTIME; Therapy: 50BWO0169 to (Evaluate:09Jul2017)  Requested for: 78ZHI3000; Last   Rx:10May2017 Ordered   6  LORazepam 2 MG Oral Tablet; TAKE 1 TABLET 3 TIMES DAILY; Therapy: 59XEX7035 to (Evaluate:11Jun2017); Last Rx:08Mar2017 Ordered   7  Morphine Sulfate ER 15 MG Oral Tablet Extended Release; take 1 po in am;   Therapy: 85HUK3589 to (Evaluate:18Jun2017); Last Rx:19May2017 Ordered   8  Omeprazole 20 MG Oral Capsule Delayed Release; TAKE 1 CAPSULE DAILY EVERY   MORNING BEFORE BREAKFAST; Therapy: 53UPO8545 to (Evaluate:13May2018)  Requested for: 31ZMB6143; Last   Rx:18May2017 Ordered   9  Primidone 250 MG Oral Tablet; TAKE 1 TABLET AT BEDTIME; Therapy: 13DXG0644 to (Evaluate:21Dec2017)  Requested for: 48KEM2136; Last   Rx:24Feb2017 Ordered   10  Propranolol HCl - 20 MG Oral Tablet; Take 1 tablet twice daily; Therapy: 47PEF2248 to (Evaluate:13May2018)  Requested for: 98NQZ7916; Last    Rx:18May2017 Ordered   11  RisperiDONE 2 MG Oral Tablet (RisperDAL); TAKE 1 TABLET Bedtime; Therapy: 19IDN6645 to (Evaluate:11Jun2017)  Requested for: 91BWM1125; Last    Rx:08Mar2017 Ordered   12  TiZANidine HCl - 2 MG Oral Tablet; Take 1 po tid prn spasms;     Therapy: 12IHJ5500 to (Evaluate:50Bat0307)  Requested for: 55COZ3961; Last    Rx:39Kvm8059 Ordered 13  TraZODone HCl - 150 MG Oral Tablet; TAKE 1 TABLET Once At Bedtime; Therapy: 95FAL5483 to (Evaluate:16Jun2017)  Requested for: 81SCR6342; Last    Rx:08Mar2017 Ordered   14  Venlafaxine HCl - 25 MG Oral Tablet; TAKE 1 TABLET 3 TIMES DAILY WITH FOOD; Therapy: 18YVS7152 to (Evaluate:11Jun2017)  Requested for: 93FDP5340; Last    Rx:08Mar2017 Ordered   15  VESIcare 10 MG Oral Tablet; Take 1 tablet daily; Therapy: 56SOE1445 to (Evaluate:22Jun2016); Last Rx:24Mar2016 Ordered   16  Vitamin D3 1000 UNIT Oral Capsule; TAKE 1 CAPSULE DAILY; Therapy: 15HRH0409 to (Evaluate:13May2018)  Requested for: 26UXN2963; Last    Rx:14Qxu7316 Ordered    Allergies    1   No Known Drug Allergies    Signatures   Electronically signed by : Heike Rogers RN; May 22 2017 11:15AM EST                       (Author)

## 2018-01-16 NOTE — PSYCH
Progress Note  Psychotherapy Provided [facility]: Individual Psychotherapy 45 minutes provided today  Goals addressed in session:   Goal #1  D: " My mind is racing, all the time! "  Dara Soulier " I feel ups and downs, but I'm depressed most of the time because ever since I had a stroke, I think it was a stroke, a while back----my memory is NOT good anymore, I forget to do things, right in the middle of them, or I can't do a lick of what my daughter can accomplish---she has me dizzy, just THINKING of all the things she can do in one day, that I can't do in a month!"    Pt has a hyperverbal affect,and her thoughts are not always organized  Denies current SI [facility]  She has internal racing thoughts,and it is also difficult for her to fall asleep and to stay asleep at night  Pt has memory lapses  She is alert and oriented x2---she couldn't remember the day of the week or date  Pt states she had past SI years ago,and she required psych  hospitalization at that time ---pt did not attempt suicide, ever  Pt has hopeless thoughts, many days  Pt is assisted with Calm, Deep-breathing, at beginning of session  She verbalizes her thoughts and feelings  Listening, support,and validation as a person, are given in session  Pt compares herself to her 29 yr old Multi-tasking, event-planning, creative, website-developing, wig-modelling, fashion-modelling, and Marketing-"whiz" Daughter--->and pt feels like a failure herself  We do some gentle cognitive-reframing---pt contributed her own genes , to her daughter's being and her success  Pt worked as , plus [employer], for years, to support her daughter, herself,and family members  Pt thinks her daughter may even have a touch of bipolar lazarus, and she gets into depressive "funks" also----> undiagnosed, untreated,and pt will never tell daughter that, because her daughter would "blow up",and wouldn't ever get treatment, anyway   Pt states, "She believes in [employer], only "  Pt is given some Psychoeducation,and we end the session with Mindfulness/ Deep-breathing/ positive mantras---pt to do this at home, also  Pt to continue meds as ordered by Dr Marcial Johnson pt hopes the Risperdal helps to calm pt's racing thoughts  Pt to do one goal per day---she helps watch daughter's 10mos old baby, often,and the baby responds to pt , and quiets with pt's holding of him  Pt to give self-affirmation each day  A: Bipolar II Disorder, F31 81; Generalized Anxiety Disorder, F41 10; and Cognitive Disiorder by hx, F09  P: Continue Treatment Plan, Meds, and Psychotherapy,and  from [facility]  Pt may re-start coming to [facility] here in the New Year, for increased Social Support  Pain Scale and Suicide Risk [facility]: Current Pain Assessment: moderate to severe   On a scale of 0 to 10, the patient rates current pain at 2   Current suicide risk is low   Behavioral Health Treatment Plan [address] [place] [address] [address]: Diagnosis and Treatment Plan explained to patient, patient relates understanding diagnosis and is agreeable to Treatment Plan  Assessment    1  Bipolar II disorder (296 89) (F31 81)   2  Generalized anxiety disorder (300 02) (F41 1)   3   Cognitive disorder (294 9) (F09)    Signatures   Electronically signed by : BON Dockery-BC; Dec 14 2016  7:30PM EST                       (Author)    Electronically signed by : VAZQUEZ Dockery; Dec 14 2016  7:30PM EST                       (Author)

## 2018-01-16 NOTE — PSYCH
Psych Med Mgmt    Appearance: was calm and cooperative, adequate hygiene and grooming and good eye contact  Observed mood: depressed and anxious  Observed mood: affect was constricted  Speech: a normal rate and fluent  Thought processes: coherent/organized  Hallucinations: no hallucinations present  Thought Content: no delusions  Abnormal Thoughts: The patient has no suicidal thoughts and no homicidal thoughts  Orientation: The patient is oriented to person, place and time, oriented to person, oriented to place and oriented to time  Recent and Remote Memory: short term memory intact and long term memory intact  Attention Span And Concentration: concentration impaired  Insight: Limited insight  Judgment: Her judgment was limited  Muscle Strength And Tone  Muscle strength and tone were normal    The patient is experiencing no localized pain  Goals addressed in session: Medication management       Treatment Recommendations: Continue current medications  Risks, Benefits And Possible Side Effects Of Medications: Risks, benefits, and possible side effects of medications explained to patient and patient verbalizes understanding  She reports normal appetite, normal energy level, no weight change and normal number of sleep hours  Mood is anxious  She stated she had surgery and botox for overactive bladder  she still has urges to use the bathroom   She stated she has been staying in [facility] because is about to give birth  Vitals  Signs [Data Includes: Current Encounter]   Recorded: 08Apr2016 11:50AM   Height: 5 ft 2 in  Weight: 145 lb   BMI Calculated: 26 52  BSA Calculated: 1 67    Assessment    1  Insomnia (780 52) (G47 00)   2  Panic disorder without agoraphobia (300 01) (F41 0)   3  Bipolar II disorder (296 89) (F31 81)    Plan    1  LORazepam 2 MG Oral Tablet; TAKE 1 TABLET 3 TIMES DAILY   2  Venlafaxine HCl - 25 MG Oral Tablet; TAKE 1 TABLET 3 TIMES DAILY WITH FOOD    3  TraZODone HCl - 150 MG Oral Tablet; TAKE 1 TABLET Once At Bedtime    4  RisperiDONE 2 MG Oral Tablet (RisperDAL); TAKE 1 TABLET Bedtime    Review of Systems    Constitutional: No fever, no chills, feels well, no tiredness, no recent weight gain or loss and as noted in HPI  Cardiovascular: no complaints of slow or fast heart rate, no chest pain, no palpitations  Respiratory: no complaints of shortness of breath, no wheezing, no dyspnea on exertion  Gastrointestinal: no complaints of abdominal pain, no constipation, no nausea, no diarrhea, no vomiting  Genitourinary: no complaints of dysuria, no incontinence, no pelvic pain, no urinary frequency  Musculoskeletal: no complaints of arthralgia, no myalgias, no limb pain, no joint stiffness  Integumentary: no complaints of skin rash, no itching, no dry skin  Neurological: no complaints of headache, no confusion, no numbness, no dizziness  Substance Abuse Hx    Substance Abuse History: Denies  Active Problems    1  Arthralgia Of Shoulder Region (719 41)   2  Back pain (724 5) (M54 9)   3  Bipolar II disorder (296 89) (F31 81)   4  Cognitive disorder (294 9) (F09)   5  Depression (311) (F32 9)   6  Esophageal reflux (530 81) (K21 9)   7  Fatigue (780 79) (R53 83)   8  Female pelvic pain (625 9) (R10 2)   9  Fibromyalgia (729 1) (M79 7)   10  Hip pain (719 45) (M25 559)   11  Hypertension (401 9) (I10)   12  Hypokalemia (276 8) (E87 6)   13  Insomnia (780 52) (G47 00)   14  Left knee pain (719 46) (M25 562)   15  Migraine (346 90) (G43 909)   16  Nausea with vomiting (787 01) (R11 2)   17  History of Need for prophylactic vaccination and inoculation against influenza (V04 81)    (Z23)   18  Osteoarthritis of knee (715 36) (M17 9)   19  Overactive bladder (596 51) (N32 81)   20  Panic disorder without agoraphobia (300 01) (F41 0)   21  Post traumatic stress disorder (309 81) (F43 10)   22  Right knee pain (719 46) (M25 561)   23   Tremor (781 0) (R25 1)   24  Urinary incontinence (788 30) (R32)   25  Vitamin D deficiency (268 9) (E55 9)    Past Medical History    1  History of Acute nonsuppurative otitis media, unspecified laterality (381 00) (H65 199)   2  History of Bipolar disorder (296 80) (F31 9)   3  History of Encounter for screening colonoscopy (V76 51) (Z12 11)   4  History of H/O colonoscopy (V45 89) (Z98 89)   5  History of stroke (V12 54) (Z86 73)   6  History of urinary tract infection (V13 02) (Z87 440)   7  History of Memory loss (780 93) (R41 3)   8  History of Mixed Anxiety Disorder (300 00)   9  History of Need for hepatitis C screening test (V73 89) (Z11 59)   10  History of Need for prophylactic vaccination and inoculation against influenza (V04 81)    (Z23)   11  History of Need for Tdap vaccination (V06 1) (Z23)   12  Personal history of arthritis (V13 4) (Z87 39)   13  History of Previous Spontaneous Vaginal Delivery   14  History of Screening for lipoid disorders (V77 91) (Z13 220)   15  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)   16  History of Visit for screening mammogram (V76 12) (Z12 31)    The active problems and past medical history were reviewed and updated today  Allergies    1  No Known Drug Allergies    Current Meds   1  AmLODIPine Besylate 10 MG Oral Tablet; TAKE 1 TABLET DAILY; Therapy: 70DDN3262 to (Cici Maloney)  Requested for: 88UVD9514; Last   Rx:26Oct2015 Ordered   2  Aspirin 81 MG Oral Tablet; TAKE 1 TABLET DAILY; Therapy: 69DUM1006 to (Evaluate:16Lhd2716)  Requested for: 42Wke6440; Last   Rx:29Pyg1929 Ordered   3  Baclofen 10 MG Oral Tablet; TAKE 1 TABLET Bedtime; Therapy: 48Ulr7899 to (Evaluate:23Jun2016)  Requested for: 63Gif7211; Last   Rx:42Jix7639 Ordered   4  Klor-Con 20 MEQ Oral Packet; TAKE 1 PACKET DAILY; Therapy: 68FGC0092 to Recorded   5  LORazepam 2 MG Oral Tablet; TAKE 1 TABLET 3 TIMES DAILY; Therapy: 76NAX9655 to (Evaluate:47Raf3166);  Last Rx:71Jsk2706 Ordered 6  Meloxicam 15 MG Oral Tablet; TAKE 1 TABLET DAILY WITH FOOD; Therapy: 08IJD8777 to (Evaluate:07Hwe0524)  Requested for: 14MLM6801; Last   Rx:86Mxd5860 Ordered   7  Omeprazole 20 MG Oral Capsule Delayed Release; TAKE 1 CAPSULE DAILY EVERY   MORNING BEFORE BREAKFAST; Therapy: 16CNZ2284 to (Evaluate:95Wyx2344)  Requested for: 14Kks3219; Last   Rx:19Sej9317 Ordered   8  Oxybutynin Chloride 5 MG Oral Tablet; TAKE 1 TABLET TWICE DAILY; Therapy: 99MFM6694 to (Evaluate:87Mmp5225)  Requested for: 59Cun1761; Last   Rx:72Iux3758 Ordered   9  Primidone 250 MG Oral Tablet; TAKE 1 TABLET AT BEDTIME; Therapy: 79FBF8424 to (Evaluate:23Jun2016)  Requested for: 73Lxn7359; Last   Rx:95Twf3265 Ordered   10  Propranolol HCl - 20 MG Oral Tablet; Take 1 tablet twice daily; Therapy: 06ELH1832 to ([facility])  Requested for: 27SBF3353; Last    Rx:35Rvm6330 Ordered   11  RisperiDONE 2 MG Oral Tablet (RisperDAL); TAKE 1 TABLET Bedtime; Therapy: 60OCC9591 to ([facility])  Requested for: 26Fwg4059; Last    Rx:10Dqn8539 Ordered   12  Toviaz 4 MG Oral Tablet Extended Release 24 Hour; take 1 tab daily; Therapy: 13VYT0054 to (Last Jane Moons)  Requested for: 66XFZ2813 Ordered   13  TraMADol HCl - 50 MG Oral Tablet; TAKE 1 TABLET 3 TIMES DAILY AS NEEDED; Therapy: 28IOT3926 to (Evaluate:60Lqi9510); Last Rx:40Zvc3597 Ordered   14  TraZODone HCl - 150 MG Oral Tablet; TAKE 1 TABLET Once At Bedtime; Therapy: 45EBM9284 to ([facility])  Requested for: 98Wqf4691; Last    Rx:58Byl6805 Ordered   15  Venlafaxine HCl - 25 MG Oral Tablet; TAKE 1 TABLET 3 TIMES DAILY WITH FOOD; Therapy: 54BEO0744 to ([facility])  Requested for: 03Cjq7174; Last    Rx:58Yzp4188 Ordered   16  VESIcare 10 MG Oral Tablet; Take 1 tablet daily; Therapy: 11JHY7606 to (Evaluate:08Jun2016)  Requested for: 33ZOS0140; Last    Rx:10Mar2016 Ordered   17  VESIcare 10 MG Oral Tablet; Take 1 tablet daily;     Therapy: 06JRU5320 to (Evaluate:22Jun2016); Last Rx:24Mar2016 Ordered   18  Vitamin D3 1000 UNIT Oral Capsule; TAKE 1 CAPSULE DAILY; Therapy: 85WFS2086 to (Evaluate:18Feb2017)  Requested for: 14Tvt6061; Last    Rx:29Mif9328 Ordered    The medication list was reviewed and updated today  Family Psych History    1  Family history of Colon Cancer (V16 0)    2  Family history of Alzheimer's disease (V17 2) (Z82 0)   3  Family history of cerebrovascular accident (V17 1) (Z82 3)    4  Family history of seizures (V19 8) (Z84 89)    5  Family history of bipolar disorder (V17 0) (Z81 8)    6  Family history of Breast Cancer (V16 3)    The family history was reviewed and updated today  Social History    · Being A Social Drinker   · Daily caffeine consumption, 6-8 servings a day   ·    · Education Level: Graduate school   · Never A Smoker   · No drug use  The social history was reviewed and updated today  The social history was reviewed and is unchanged  End of Encounter Meds    1  Baclofen 10 MG Oral Tablet; TAKE 1 TABLET Bedtime; Therapy: 79Rwo3617 to (Evaluate:23Jun2016)  Requested for: 64Yjg5942; Last   Rx:24Feb2016 Ordered   2  TraMADol HCl - 50 MG Oral Tablet; TAKE 1 TABLET 3 TIMES DAILY AS NEEDED; Therapy: 99ABP8032 to (Evaluate:24Apr2016); Last Rx:28Axv3403 Ordered    3  Omeprazole 20 MG Oral Capsule Delayed Release; TAKE 1 CAPSULE DAILY EVERY   MORNING BEFORE BREAKFAST; Therapy: 81VZY4477 to (Evaluate:18Feb2017)  Requested for: 71Wou9686; Last   Rx:23Csy2914 Ordered    4  LORazepam 2 MG Oral Tablet; TAKE 1 TABLET 3 TIMES DAILY; Therapy: 71BXN5904 to (Evaluate:51Vic2072); Last Rx:08Apr2016 Ordered   5  Venlafaxine HCl - 25 MG Oral Tablet; TAKE 1 TABLET 3 TIMES DAILY WITH FOOD; Therapy: 85IHC1260 to (Evaluate:22Dec2524)  Requested for: 73Nav9604; Last   Rx:08Apr2016 Ordered    6  AmLODIPine Besylate 10 MG Oral Tablet; TAKE 1 TABLET DAILY;    Therapy: 58SZG8296 to (Leeann Cheung)  Requested for: 86AED1405; Last   Rx:26Oct2015 Ordered    7  TraZODone HCl - 150 MG Oral Tablet; TAKE 1 TABLET Once At Bedtime; Therapy: 97FVF7073 to (Evaluate:11Gnh9074)  Requested for: 95Acv2382; Last   Rx:08Apr2016 Ordered    8  Propranolol HCl - 20 MG Oral Tablet; Take 1 tablet twice daily; Therapy: 77FBW9376 to (Xochilt Perch)  Requested for: 76RXU6443; Last   Rx:26Oct2015 Ordered    9  Meloxicam 15 MG Oral Tablet; TAKE 1 TABLET DAILY WITH FOOD; Therapy: 05AYX6263 to (Evaluate:80Lph4253)  Requested for: 44XMS1273; Last   Rx:13Jan2016 Ordered    10  Toviaz 4 MG Oral Tablet Extended Release 24 Hour; take 1 tab daily; Therapy: 78MIS2123 to (Last Denysvie Rodriguez)  Requested for: 47FCO2391 Ordered   11  VESIcare 10 MG Oral Tablet; Take 1 tablet daily; Therapy: 56GXU3296 to (Evaluate:08Jun2016)  Requested for: 98ISX0213; Last    Rx:10Mar2016 Ordered    12  VESIcare 10 MG Oral Tablet; Take 1 tablet daily; Therapy: 99QIF4272 to (Evaluate:22Jun2016); Last Rx:24Mar2016 Ordered    13  RisperiDONE 2 MG Oral Tablet (RisperDAL); TAKE 1 TABLET Bedtime; Therapy: 90VRE3044 to (Evaluate:26Ovk1090)  Requested for: 94Kup3880; Last    Rx:74Ivo1246 Ordered    14  Aspirin 81 MG Oral Tablet; TAKE 1 TABLET DAILY; Therapy: 81BUE4999 to (Evaluate:42Fgd2339)  Requested for: 75Ngz2871; Last    Rx:70Rcv6793 Ordered    15  Klor-Con 20 MEQ Oral Packet; TAKE 1 PACKET DAILY; Therapy: 59HJE4524 to Recorded    16  Primidone 250 MG Oral Tablet; TAKE 1 TABLET AT BEDTIME; Therapy: 74PED3079 to (Evaluate:23Jun2016)  Requested for: 07Yvk6855; Last    Rx:21Hjl3679 Ordered    17  Oxybutynin Chloride 5 MG Oral Tablet; TAKE 1 TABLET TWICE DAILY; Therapy: 53PKC4631 to (Evaluate:69Hhk4627)  Requested for: 93Bmo9795; Last    Rx:43Qga1846 Ordered    18  Vitamin D3 1000 UNIT Oral Capsule; TAKE 1 CAPSULE DAILY;     Therapy: 33WJU0010 to (Evaluate:18Feb2017)  Requested for: 12Bsv3867; Last    Rx:56Eib6526 Ordered    Future Appointments    Date/Time Provider Specialty Site   04/21/2016 02:15 PM Falguni Walton, MS, APRN, PMHCNS_BS  [facility]   05/05/2016 02:15 PM Falguni Walton, MS, APRN, PMHCNS_BS  [facility]   05/19/2016 02:15 PM Falguni Walton, MS, APRN, [facility]HC[facility]_BS  [facility]   06/02/2016 02:15 PM Falguni Walton, MS, APRN, PMHCNS_BS  [facility]   06/16/2016 02:15 PM Falguni Walton, MS, APRN, PMHCNS_BS  [facility]     Signatures   Electronically signed by : ASHLEY Sanon ; Apr 8 2016 11:56AM EST                       (Author)

## 2018-01-16 NOTE — PSYCH
Progress Note  Psychotherapy Provided [facility]: Individual Psychotherapy 45 minutes provided today  Goals addressed in session:   Goal #1  D: " I feel sad, and nervous inside myself"   " I'm, right now, waiting for someone close to me to pass away---My brother has only 3 months to live, maybe shorter, his doctors have told him and us "   " He is in [place], he has Stage 4 Colon Cancer which spread to many parts of his body,and he's in a [facility], now   " " I talk to him on the phone, and he's always called me on my birthday, he's always called my kids on their birthdays  Ana Paulayg Dee Ana Paulayg Dee But I have no money to go down there now---When he does pass away, somehow my family will be putting some money together for me to fly down there, and to represent them, at his [facility] [place]"    "It will be very hard for me, but I will do it, for them, and in honor of him "   Pt has a depressed, anxious, briefly-tearful affect  Depressed mood  Denies TR[place] [address]  Pt is alert and oriented x3, but she does have memory-lapses at times  Her sleep is not good, some nights,as she has Anticipatory Grieving re: her brother Augustina Cano, who is dying of Stage 4 Colon Cancer,and he is very weak and cachetic at this time, from all accounts by pt    Pt processes her thoughts, feelings,and behaviors  Pt processes and reminisces re; her good times and her trying times with her family, with her broken relationships with men, and with her health, over the years---but this brother was always there for her,and she will not forget that  Pt processes her Anticipatory Grief, and Grief Therapy is given  Active Listening, Support,and Validation given  Pt also has severe financial stress,as she only has income of $700 a month, Social Security Disability,and she pays $400 to her daughter for rent expenses, plus pt helps with her little 7 month old grandson most days, for a few hours   Pt to problem-solve, to try to gather enough money from family members, to go to [place] for her Brother's final days and/ or his Shante Bustard  Pt is assisted in session with Deep-Breathing,and some Cognitive-reframing re: her Survivor strengths  Pt says she is thankful to have a roof over her head, food, clothing, and loving family members---and her active baby grandson is a handful, but he does make her laugh with his antics  Pt says Prayer, each day,and she feels that her Leann in God does sustain her,and she is saying special prayers for her brother lately, for peace and a pain-free death  Pt takes Meds as prescribed by Psychiatrist and by her other physician, no side effects except some sedation, intermittently  Chronic Pain =8, L  Hip, today  A: Bipolar II Disorder, F31 81; Generalized Anxiety Disorder, F41 10; Anticipatory Grieving re: her Brother; Medical issues including reported hx of stroke, and Chronic Pain; and severe Financial Stress  P: Continue Treatment Plan, Meds,and Psychotherapy, including Grief Therapy  Pt states she cannot go to Grief Groups or other Groups very often, due to Transportation or Family issues or Financial issues, but she will keep the Bipolar Group in mind  Pain Scale and Suicide Risk [facility]: Current Pain Assessment: moderate to severe   On a scale of 0 to 10, the patient rates current pain at 8   Current suicide risk is low   Behavioral Health Treatment Plan [facility]: Diagnosis and Treatment Plan explained to patient, patient relates understanding diagnosis and is agreeable to Treatment Plan  Assessment    1  Bipolar II disorder (296 89) (F31 81)   2  Generalized anxiety disorder (300 02) (F41 1)   3   Pain, joint, hip (719 45) (M20 969)    Signatures   Electronically signed by : Dai Crain MSAPRNPMHCNS-BC; Feb 23 2017  8:50PM EST                       (Author)

## 2018-01-17 NOTE — MISCELLANEOUS
Message   Recorded as Task   Date: 06/06/2017 12:02 PM, Created By: Rosaura Galindo   Task Name: Call Back   Assigned To: [facility] [facility],Team   Regarding Patient: Tony Moreno, Status: Active   CommentNamon Cords - 06 Jun 2017 12:02 PM     TASK CREATED  [facility]- Patient called requesting to s/w someone about her meds  Stated she is taking 1 pill and its not working   would like to take 2 and wants to know if that is advisibable (Tylenol w/ Codine)   please call patient [phone]   [place] - 06 Jun 2017 1:02 PM     TASK EDITED  S/w the pt  and she states she continues with pain in her bilateral hips and it feels like her legs are pulling when she walks  She says she is not taking the morphine and is wondering if she can increase the tylenol c/ codeine  She continues on the prescribed meds as ordered  DG to advise please  Thanks  ******[facility] pt *****   Jenaro Fonseca - 06 Jun 2017 5:06 PM     TASK REPLIED TO: Previously Assigned To Jenaro Fonseca, she either needs to try the Morphine as we discussed or just use the T#3 as prescribed  We have discussed this at length several times  This is what the options are and she needs to make a decision and stick with a treatment plan  If she is not happy with the options we continue to meticulosly provide and throughly discuss, she is welcome to seek out another pain management opinion else where and offer a list  This is ridiculous how much she calls  Estelle Russell - 07 Jun 2017 8:28 AM     TASK EDITED  S/W pt  Advised pt of the same  Offered pt a pain management list and she denied a list   Pt stated "I'm satisified with the management I have "  Pt stated her morphine is due to be filled June 16,2017 and she won't be home from her trip  Pt stated she may come back from her trip early b/c she is having problems with the hotel  Pt stated she is going to take the morphine in the am and the Tylenol #3 at bedtime     Jenaro Fonseca - 07 Jun 2017 9:32 AM     TASK REPLIED TO: Previously Assigned To Jenaro Fonseca  Provider aware  Thank you  Active Problems    1  Analgesic use (V58 69) (Z79 899)   2  Anxiety (300 00) (F41 9)   3  Arthralgia Of Shoulder Region (719 41)   4  Back pain (724 5) (M54 9)   5  Bipolar II disorder (296 89) (F31 81)   6  Chronic bilateral low back pain with bilateral sciatica (724 2,724 3,338 29)   (M54 42,M54 41,G89 29)   7  Chronic pain of both knees (734 27,326 76) (M25 561,M25 562,G89 29)   8  Chronic pain syndrome (338 4) (G89 4)   9  Cognitive disorder (294 9) (F09)   10  Esophageal reflux (530 81) (K21 9)   11  Fatigue (780 79) (R53 83)   12  Female pelvic pain (625 9) (R10 2)   13  Fibromyalgia (729 1) (M79 7)   14  Generalized anxiety disorder (300 02) (F41 1)   15  Hypertension (401 9) (I10)   16  Hypokalemia (276 8) (E87 6)   17  Insomnia (780 52) (G47 00)   18  Lumbar spondylosis (721 3) (M47 816)   19  Major depressive disorder, recurrent episode, moderate degree (296 32) (F33 1)   20  Migraine (346 90) (G43 909)   21  History of Need for prophylactic vaccination and inoculation against influenza (V04 81)    (Z23)   22  Opioid dependence (304 00) (F11 20)   23  Osteoarthritis of both hips, unspecified osteoarthritis type (715 95) (M16 0)   24  Osteoarthritis of knee (715 36) (M17 10)   25  Overactive bladder (596 51) (N32 81)   26  Pain, joint, hip (719 45) (M25 559)   27  Panic disorder without agoraphobia (300 01) (F41 0)   28  Post traumatic stress disorder (309 81) (F43 10)   29  Recent unexplained weight loss (783 21) (R63 4)   30  Right knee pain (719 46) (M25 561)   31  Sacroiliitis (720 2) (M46 1)   32  Tremor (781 0) (R25 1)   33  Urinary incontinence (788 30) (R32)   34  Vitamin D deficiency (268 9) (E55 9)    Current Meds   1  Acetaminophen-Codeine #3 300-30 MG Oral Tablet; TAKE 1 TABLET TWICE DAILY AS   NEEDED FOR PAIN;   Therapy: 76DPP4435 to (Evaluate:80Ytz4583); Last Rx:89Dpi1274 Ordered   2   AmLODIPine Besylate 10 MG Oral Tablet; TAKE 1 TABLET DAILY; Therapy: 59BQM0044 to (Evaluate:13May2018)  Requested for: 33SIC5464; Last   Rx:18May2017 Ordered   3  Aspirin 81 MG Oral Tablet Delayed Release; take 1 tablet by mouth daily; Therapy: 40PCG0634 to (Evaluate:19Feb2018)  Requested for: 64YKZ6021; Last   Rx:24Feb2017 Ordered   4  Diclofenac Potassium 50 MG Oral Tablet; TAKE 1 TABLET TWICE DAILY AFTER MEALS; Therapy: 90WEK6849 to (77 873 135)  Requested for: 89LMO7982; Last   Rx:09Jan2017 Ordered   5  Gabapentin 400 MG Oral Capsule; TAKE 1 CAPSULE AT BEDTIME; Therapy: 04TPY0280 to (Evaluate:07Afh9826)  Requested for: 07FEQ5650; Last   Rx:10May2017 Ordered   6  LORazepam 2 MG Oral Tablet; TAKE 1 TABLET 3 TIMES DAILY; Therapy: 82UQB5280 to (Evaluate:22Aug2017); Last Rx:24May2017 Ordered   7  Morphine Sulfate ER 15 MG Oral Tablet Extended Release; take 1 po in am;   Therapy: 64GEV8387 to (Evaluate:18Jun2017); Last Rx:19May2017 Ordered   8  Omeprazole 20 MG Oral Capsule Delayed Release; TAKE 1 CAPSULE DAILY EVERY   MORNING BEFORE BREAKFAST; Therapy: 54VLK2553 to (Evaluate:13May2018)  Requested for: 27GQC1478; Last   Rx:18May2017 Ordered   9  Primidone 250 MG Oral Tablet; TAKE 1 TABLET AT BEDTIME; Therapy: 50FUU5065 to (Evaluate:00Lal6859)  Requested for: 85YZN3119; Last   Rx:24Feb2017 Ordered   10  Propranolol HCl - 20 MG Oral Tablet; Take 1 tablet twice daily; Therapy: 95IFQ6081 to (Evaluate:13May2018)  Requested for: 07KES0231; Last    Rx:18May2017 Ordered   11  RisperiDONE 2 MG Oral Tablet (RisperDAL); TAKE 1 TABLET Bedtime; Therapy: 37YTD8698 to (Evaluate:22Aug2017)  Requested for: 15ZLE3628; Last    Rx:24May2017 Ordered   12  TiZANidine HCl - 2 MG Oral Tablet; Take 1 po tid prn spasms; Therapy: 58AJT1501 to (Evaluate:61Gmv1900)  Requested for: 68ACY0602; Last    Rx:53Qsn7817 Ordered   13  TraZODone HCl - 150 MG Oral Tablet; TAKE 1 TABLET Once At Bedtime;     Therapy: 77KZX9282 to (Evaluate:12Iio7267)  Requested for: 30BIU6029; Last    Rx:67Qae6810 Ordered   14  Venlafaxine HCl - 25 MG Oral Tablet; TAKE 1 TABLET 3 TIMES DAILY WITH FOOD; Therapy: 12KFZ8722 to (Evaluate:48Uqc2270)  Requested for: 76AUC6312; Last    Rx:97Lnn8086 Ordered   15  VESIcare 10 MG Oral Tablet; Take 1 tablet daily; Therapy: 15VKJ3481 to (Mike Coronel)  Requested for: 01CIQ3169; Last    Rx:77Yot6294 Ordered   16  Vitamin D3 1000 UNIT Oral Capsule; TAKE 1 CAPSULE DAILY; Therapy: 47SUB4153 to (Evaluate:46Rqt6451)  Requested for: 06BBP0102; Last    Rx:73Pnx9210 Ordered    Allergies    1   No Known Drug Allergies    Signatures   Electronically signed by : Bill Dorman, ; Jun 7 2017  9:36AM EST                       (Author)

## 2018-01-17 NOTE — RESULT NOTES
Message   Recorded as Task   Date: 05/11/2017 11:29 AM, Created By: Emelyn Reeves   Task Name: Miscellaneous   Assigned To: [facility] Authorization,Team   Regarding Patient: Elysia Denney, Status: Active   CommentElizebeth Heal - 11 May 2017 11:29 AM     TASK CREATED  Pt called and stated she needs a prior auth for Butrans patch  Please call [phone]  Carie Reyna - 12 May 2017 9:19 AM     TASK REASSIGNED: Previously Assigned To [facility] Parisa Hahn - 12 May 2017 9:22 AM     TASK REASSIGNED: Previously Assigned To Jenaro Fonseca  Can you please work on this? Thank you  Denia Branch - 15 May 7220 5:37 AM     TASK IN PROGRESS   Denia Branch - 16 May 3228 3:47 AM     TASK EDITED  Submitted online request for Butrans through cover my meds  Will await the response  Denia Branch - 16 May 5955 5:81 AM     TASK EDITED   Denia Branch - 17 May 4282 1:84 AM     TASK EDITED  Skinny cat -     Paperwork given to you  Jenaro Fonseca - 17 May 2017 7:49 PM     TASK REPLIED TO: Previously Assigned To Jenaro Fonseca  We are going to try [facility]  This is a seperate task to the nurses and they are taking care of it  Thank you          Signatures   Electronically signed by : Luis Rayo, ; May 18 2017 10:07AM EST                       (Author)

## 2018-01-17 NOTE — RESULT NOTES
Message   Recorded as Task   Date: 10/16/2017 02:51 PM, Created By: Jackeline Estrella   Task Name: Miscellaneous   Assigned To: [facility]   Regarding Patient: Krystle Pearson, Status: Active   Comment:    DelJoseline - 16 Oct 2017 2:51 PM     TASK CREATED  Pt called stating she was told that Lorazepam (ordered by her psychiatrist) could cause a drug interaction with Morphine and Tylenol 3  Pls call pt at [phone]  Carolina Reno - 16 Oct 2017 3:14 PM     TASK EDITED  Just making sure before I call her that you do not want her taking Benzo's if she is taking opiod meds  Correct? Rodrigue Lyman - 17 Oct 2017 8:37 AM     TASK REPLIED TO: Previously Assigned To Rodrigue Lyman  Yes I discussed with her that she would need to be off of the Lorazepam at her next office visit if we are to continue her opioid medications  Carolina Reno - 17 Oct 2017 9:59 AM     TASK EDITED  S/w the pt  and she stated she saw Dr Jos Osuna and she cannot be weaned off of the Ativan because she has panic attacks and a high level stress  Her next sovs is on 11/7  LB to advise  Thanks   Rodrigue Lyman - 17 Oct 2017 10:12 AM     TASK REPLIED TO: Previously Assigned To [employer] we will discuss at next office visit  Carolina Reno - 17 Oct 2017 10:17 AM     TASK EDITED  Pt  aware          Signatures   Electronically signed by : Shannon Velasquez, ; Oct 17 2017 10:17AM EST                       (Author)

## 2018-01-17 NOTE — RESULT NOTES
Verified Results  * MRI HIP LEFT WO CONTRAST [id number] 01:15PM Kenya Posada     Test Name Result Flag Reference   MRI HIP LEFT WO CONTRAST (Report)     1  MRI RIGHT HIP   2  MRI LEFT HIP     INDICATION: M16 0: Bilateral primary osteoarthritis of hip  History taken directly from the electronic ordering system  COMPARISON: Hip radiographs dated 9/6/2016  TECHNIQUE:    1  MR sequences were obtained of the right hip and pelvis including: Localizer, axial T2 fat sat, coronal T1/STIR, cone-down axial oblique PD of the right hip, coronal PD of the right hip, sagittal T2 fat sat of the right hip  Images were acquired on a    1 5 [employer] unit  Gadolinium was not used  2  Cone-down axial oblique PD of the left hip, coronal PD of the left hip, sagittal T2 fat sat of the left hip  Images were acquired on a 1 5 Shantell unit  Gadolinium was not used  FINDINGS:     RIGHT HIP:     -JOINT EFFUSION: None  -BONE MARROW SIGNAL AND ALIGNMENT: Normal marrow signal demonstrated without hip fracture or AVN  -ACETABULAR LABRUM: Intact  -TROCHANTERIC BURSA: Normal      LEFT HIP:      -JOINT EFFUSION: None  -BONE MARROW SIGNAL AND ALIGNMENT: Normal marrow signal demonstrated without hip fracture or AVN  Faint subchondral edema in the anterior acetabular roof  However, there is no visible full-thickness cartilage fissuring associated with this finding  Furthermore, no osteophytes or subchondral cysts  -ACETABULAR LABRUM: No gross abnormalities although evaluation is very limited  -TROCHANTERIC BURSA: Normal      PELVIS:     -BONE MARROW SIGNAL: No fracture, osteonecrosis, or pathologic marrow infiltration  -SI JOINTS AND SYMPHYSIS PUBIS: Minimal degenerative spurring at the pubic symphysis  -VISUALIZED LUMBAR SPINE: Lumbar levoscoliosis with multilevel disc as indication and edematous endplate changes  Impact on foramina and spinal canal are not well assessed       -MUSCULATURE: Intact with intact hamstring origins bilaterally  -PELVIC SOFT TISSUES: Prior hysterectomy  Ovaries not well visualized  No adnexal masses  Trace free pelvic fluid  Urinary bladder and visualized bowel are grossly unremarkable  SUBCUTANEOUS TISSUES: Normal no inguinal adenopathy  IMPRESSION:   No acute findings, internal derangement, myotendinous injuries, or significant degenerative changes at either hip         Workstation performed: [id number]     Signed by:   Chau Chandler MD   7/11/17

## 2018-01-17 NOTE — RESULT NOTES
Message   Recorded as Task   Date: 11/22/2016 04:36 PM, Created By: Bryanna Guaman   Task Name: Follow Up   Assigned To: [facility]   Regarding Patient: Geovanny Banks, Status: In Progress   Juan Mgeorge Ivone - 22 Nov 2016 4:36 PM     TASK CREATED  Caller: Self; General Medical Question; [phone] (Home); [phone] (Work)  Received VM from pt  on [facility] triage line from 336 pm  Pt  states that she sees Dr Alber Hodges, and just had a hip inj  on 11/9  Per pt  she was told to take Diclofenac 50 mg and continue w/ her Tramadol 50 mg  Per pt  she is having a problem w/ the Tramadol 50 mg because her doctor won't prescribe it anymore since it has been a while since she saw him  Pt  states that she is wondering if Dr Alber Hodges can prescribe the Tramadol 50 mg  Pt  requesting c/b at [phone]  Attempted to reach pt  on home/cell phone  LMOM for pt  to c/b  Loree Tobin - 22 Nov 2016 4:36 PM     TASK IN PROGRESS   Carolina Reno - 25 Nov 2016 8:37 AM     TASK EDITED   Yudy Payan - 25 Nov 2016 2:26 PM     TASK REPLIED TO: Previously Assigned To Yudy Payan                      Per our practice's policy I do not prescribe opioids on the first visit and her last office visit was her initial consult  If she would like tramadol refilled she needs to go to the prescribing physician for a follow-up and they can decide if they would like to continue the medication  I will decide at the next office visit whether or not she is appropriate for opioid therapy   I also need to obtain a drug screen prior to providing a prescription for opioids to determine appropriateness   Carolina Reno - 25 Nov 2016 2:57 PM     TASK EDITED  S/w the pt  and she understands and will f/u at the sovs         Signatures   Electronically signed by : Beata Tang, ; Nov 25 2016  2:57PM EST                       (Author)

## 2018-01-17 NOTE — PSYCH
Treatment Plan Tracking    #1 Treatment Plan not completed within required time limits due to: Client presented with emotional/behavioral issues that required clinical intervention  , Other: Pt is processing her Anticipatory Grieving re: Brother has 3 months to live/ severe metastasized Colon Cancer             Signatures   Electronically signed by : Rom Turner [id number]; Feb 23 2017  4:24PM EST                       (Author)

## 2018-01-17 NOTE — MISCELLANEOUS
Message   Recorded as Task   Date: 05/17/2017 10:04 AM, Created By: Nelson Pineda   Task Name: Miscellaneous   Assigned To: [facility]   Regarding Patient: Carmel Salinas, Status: Active   CommentIlia Moore - 17 May 2017 10:04 AM     TASK CREATED  pt is going out of town and states she needs a vacation overide for her gabepentin 400 mg  pt also states that she needs her  butrans pain patch that was being worked  on   please call pt back at  - 17 May 2017 10:56 AM     TASK EDITED  S/w pt  Pt stated she will be in [place] from June 9-July 6, 2017  Pt stated her insurance co  told her to get a vacation override stating the day she is leaving and coming back and what prescription she needs  Pt stated it takes 3 days to go through  Pt stated her Gabapentin is due to be filled Winifred 10, 2017  She takes Gabapentin 400mg, 1 capsule at bedtime  Pt is wondering if the prior auth  went through for the Butrans patch? Please advise  Jenaro Fonseca - 17 May 2017 7:49 PM     TASK REPLIED TO: Previously Assigned To Jenaro Fonseca  Her gabapentin can be filled any where as far as opioids the only way I can help [facility]  Her Butrans was denied  We can try MS ER 10 mg QD and [facility] it through [facility]  Let me know what she thinkgs  Estelle Russell - 07 May 2017 8:28 AM     TASK EDITED  S/w pt  Advised pt of the same  Pt stated her insurance is [employer] and they told her she can only fill her prescriptions in [place]  Pt is asking why the MS ER needs to [facility]? Pt never heard about [facility] before and has lots of questions about [facility]  Tried to answer them  Pt asking if the MS ER can be filled at her pharmacy? Could [facility] be discussed with her at her next appt?    Jenaro Fonseca - 18 May 2017 8:50 AM     TASK REPLIED TO: Previously Assigned To Jenaro Fonseca  WE don't have to use [facility], but for the narcotics if she is out of state when it needs to be filled there is nothing I can do  I can have scripts for her to  at the [place] office, but she will need to bring back the [facility] patch scripts  Thank you  Walkerjessica Warren - 39 May 2017 9:07 AM     TASK EDITED  When can she pick the scripts up at [facility]? eJnaro Fonseca - 18 May 2017 1:00 PM     TASK REPLIED TO: Previously Assigned To Jenaro Fonseca  The patient's scripts for the morphine will be available at the [place] office tomorrow morning  She will need to bring in the 2 Butrans scripts to swap them out  Thank you  Estelle Russell - 70 May 2017 2:15 PM     TASK EDITED  S/w pt  Advised pt of the same  Pt verbalized understanding  Pt asked what about her Gabapentin? Pt stated it is due to be filled on 6/10/17 and that is when she is leaving  Pt stated she can not fill scripts outside of [place] and "that I has some 300mg to get me by "  Advised pt to check with her pharmacy to see if she can fill it the day before she leaves  Pt stated she will check and call us back if they can't  Please advise  Mike Yousif - 39 May 2017 3:27 PM     TASK EDITED  Pt called and stated the script is not covered  Please call [phone]  [facility] - 73 May 2017 3:56 PM     TASK EDITED  LMOM on home/cell # for pt to C/B, C/B # and office hrs provided  Cristine Mendez - 18 May 2017 4:15 PM     TASK EDITED  pt returning call and she would like a cb early morning so that [facility] [address] to [place] if you dont have the correct script  please call pt back at  - 19 May 2017 8:21 AM     TASK EDITED  took call transferred from [facility] S/W pt  Jenaro called [facility] stating the scripts will be here around noon- pt aware  Samantha stated she called insurance company about the MS ER 10mg and they don't have it  Pt stated they only have MS ER 15mg capsules or morphine 10mg concentrate solution    Pt stated it will be hard for her to take liquid with all her pills and she uses a pill box with all her traveling  Pt stated "I need a prescription  I am in alot of pain "      **she task below at 2:15pm regarding gabapentin**    Pt stated she needs to know by 11am b/c her Nikki bajwa is coming at 11:30 to pick her up  LMOM for Jenaro Gamez - 19 May 2017 8:32 AM     TASK EDITED  S/W Paula Guzman  Explained previous tasks to  him  Will forward this to him for him to advise  Jenaro Fonseca - 19 May 2017 9:03 AM     TASK REPLIED TO: Previously Assigned To Jenaro Fonseca  Since her insurance company only covers the MS ER 15 mg, we will change it to MS ER 15 mg, 1 PO QD in AM  She can continue with 1 T#3 at bed time PRN  The scripts will be available for  by 1200  Also regarding the gabapentin she can use her leftover 300 mg HS if she can't get the 400 mg filled before she leaves for [facility]  Thank you  Estelle Russell - 29 May 2017 9:30 AM     TASK EDITED  S/W pt  Advised pt of the same  Pt wrote down the medication instructions  Pt verbalized understanding and she will be here today to  the scripts  Cristine Mendez - 19 May 2017 11:01 AM     TASK EDITED  pt called and said that the morphine sulfate er are tablets not capsules for when she picks up the scripts  questions you can call  - 19 May 2017 11:49 AM     TASK EDITED  scripts were written for tablets  Shwetha Gamez - 19 May 2017 12:45 PM     TASK EDITED  Pt just picked up scripts from [facility]  Received returned script of Butrans 5 mcg/hr transdermal patch weekly, apply 1 patch TD every 7 days for 4 patches written on 5/10/17 by Viraj Welch  Script voided with date & time and to be scanned in to the chart  Received returned script of Butrans 5 mcg/hr transdermal patch weekly, apply 1 patch TD every 7 days for 4 patches written on 5/10/17 with a do not fill until 6/7/17 by Viraj Welch  Script voided with date & time and to be scanned in to the chart     Jenaro Fonseca - 19 May 2017 2:15 PM     TASK REPLIED TO: Previously Assigned To Jaswinder [facility]  [place] aware  Thank you!! Active Problems    1  Analgesic use (V58 69) (Z79 899)   2  Anxiety (300 00) (F41 9)   3  Arthralgia Of Shoulder Region (719 41)   4  Back pain (724 5) (M54 9)   5  Bipolar II disorder (296 89) (F31 81)   6  Chronic bilateral low back pain with bilateral sciatica (724 2,724 3,338 29)   (M54 42,M54 41,G89 29)   7  Chronic pain of both knees (462 20,000 59) (M25 561,M25 562,G89 29)   8  Chronic pain syndrome (338 4) (G89 4)   9  Cognitive disorder (294 9) (F09)   10  Esophageal reflux (530 81) (K21 9)   11  Fatigue (780 79) (R53 83)   12  Female pelvic pain (625 9) (R10 2)   13  Fibromyalgia (729 1) (M79 7)   14  Generalized anxiety disorder (300 02) (F41 1)   15  Hypertension (401 9) (I10)   16  Hypokalemia (276 8) (E87 6)   17  Insomnia (780 52) (G47 00)   18  Lumbar spondylosis (721 3) (M47 816)   19  Major depressive disorder, recurrent episode, moderate degree (296 32) (F33 1)   20  Migraine (346 90) (G43 909)   21  History of Need for prophylactic vaccination and inoculation against influenza (V04 81)    (Z23)   22  Opioid dependence (304 00) (F11 20)   23  Osteoarthritis of both hips, unspecified osteoarthritis type (715 95) (M16 0)   24  Osteoarthritis of knee (715 36) (M17 10)   25  Overactive bladder (596 51) (N32 81)   26  Pain, joint, hip (719 45) (M25 559)   27  Panic disorder without agoraphobia (300 01) (F41 0)   28  Post traumatic stress disorder (309 81) (F43 10)   29  Recent unexplained weight loss (783 21) (R63 4)   30  Right knee pain (719 46) (M25 561)   31  Sacroiliitis (720 2) (M46 1)   32  Tremor (781 0) (R25 1)   33  Urinary incontinence (788 30) (R32)   34  Vitamin D deficiency (268 9) (E55 9)    Current Meds   1  Acetaminophen-Codeine #3 300-30 MG Oral Tablet; TAKE 1 TABLET TWICE DAILY AS   NEEDED FOR PAIN;   Therapy: 92IJO4054 to (Evaluate:99Wap7490); Last Rx:60Jrr9348 Ordered   2   AmLODIPine Besylate 10 MG Oral Tablet; TAKE 1 TABLET DAILY; Therapy: 66HBC0347 to (Evaluate:13May2018)  Requested for: 56OXU2256; Last   Rx:18May2017 Ordered   3  Aspirin 81 MG Oral Tablet Delayed Release; take 1 tablet by mouth daily; Therapy: 36JGJ3090 to (Evaluate:19Feb2018)  Requested for: 48HCX9376; Last   Rx:24Feb2017 Ordered   4  Diclofenac Potassium 50 MG Oral Tablet; TAKE 1 TABLET TWICE DAILY AFTER MEALS; Therapy: 24DSR9965 to (Jb Walters)  Requested for: 76WYJ8164; Last   Rx:09Jan2017 Ordered   5  Gabapentin 400 MG Oral Capsule; TAKE 1 CAPSULE AT BEDTIME; Therapy: 76PDR4168 to (Evaluate:09Jul2017)  Requested for: 20OME1877; Last   Rx:10May2017 Ordered   6  LORazepam 2 MG Oral Tablet; TAKE 1 TABLET 3 TIMES DAILY; Therapy: 68ICO7424 to (Evaluate:11Jun2017); Last Rx:08Mar2017 Ordered   7  Morphine Sulfate ER 15 MG Oral Tablet Extended Release; take 1 po in am;   Therapy: 89VRX1321 to (Evaluate:18Jun2017); Last Rx:19May2017 Ordered   8  Omeprazole 20 MG Oral Capsule Delayed Release; TAKE 1 CAPSULE DAILY EVERY   MORNING BEFORE BREAKFAST; Therapy: 33WAO1301 to (Evaluate:13May2018)  Requested for: 93CAV9090; Last   Rx:18May2017 Ordered   9  Primidone 250 MG Oral Tablet; TAKE 1 TABLET AT BEDTIME; Therapy: 79QXD4175 to (Evaluate:21Dec2017)  Requested for: 24URV5857; Last   Rx:24Feb2017 Ordered   10  Propranolol HCl - 20 MG Oral Tablet; Take 1 tablet twice daily; Therapy: 40QVM1917 to (Evaluate:13May2018)  Requested for: 31YAW9122; Last    Rx:18May2017 Ordered   11  RisperiDONE 2 MG Oral Tablet (RisperDAL); TAKE 1 TABLET Bedtime; Therapy: 83GKS6059 to (Evaluate:11Jun2017)  Requested for: 98ZOX4491; Last    Rx:08Mar2017 Ordered   12  TiZANidine HCl - 2 MG Oral Tablet; Take 1 po tid prn spasms; Therapy: 28EWB4094 to (Evaluate:80Pdb7807)  Requested for: 37JWO1504; Last    Rx:21Ncd0545 Ordered   13  TraZODone HCl - 150 MG Oral Tablet; TAKE 1 TABLET Once At Bedtime;     Therapy: 70IVP2873 to (Evaluate:31Ypn0651)  Requested for: 66JFF7341; Last    Rx:08Mar2017 Ordered   14  Venlafaxine HCl - 25 MG Oral Tablet; TAKE 1 TABLET 3 TIMES DAILY WITH FOOD; Therapy: 09NGJ3230 to (Evaluate:11Jun2017)  Requested for: 76LUK9237; Last    Rx:08Mar2017 Ordered   15  VESIcare 10 MG Oral Tablet; Take 1 tablet daily; Therapy: 22RYJ5399 to (Evaluate:22Jun2016); Last Rx:24Mar2016 Ordered   16  Vitamin D3 1000 UNIT Oral Capsule; TAKE 1 CAPSULE DAILY; Therapy: 78XID1174 to (Evaluate:13May2018)  Requested for: 28JIA5695; Last    Rx:51Atr4764 Ordered    Allergies    1   No Known Drug Allergies    Signatures   Electronically signed by : Estela Mason, ; May 19 2017  2:24PM EST                       (Author)

## 2018-01-17 NOTE — MISCELLANEOUS
Message   Recorded as Task   Date: 07/17/2017 01:41 PM, Created By: Joe Blackwell   Task Name: Miscellaneous   Assigned To: [facility]   Regarding Patient: Radha Reyes, Status: Active   Comment:    Joe Blackwell - 17 Jul 2017 1:41 PM     TASK CREATED  phone call from patient stating that she needs a refill on tizanidine 2mg, 3x per day  please call patient at [phone]  Estelle Russell - 17 Jul 2017 3:07 PM     TASK EDITED  S/W pt to [facility] pharmacy  Pharmacy on file  Please advise  Jenaro Fonseca - 17 Jul 2017 3:21 PM     TASK REPLIED TO: Previously Assigned To Jenaro Fonseca   I e-scribed the tizanidine for a 30 day supply to her pharmacy we should get her to her office visit as scheduled in [facility] - 17 Jul 2017 3:25 PM     TASK EDITED  S/w pt  Advised pt of the same  Pt appreciative  Active Problems    1  Analgesic use (V58 69) (Z79 899)   2  Anxiety (300 00) (F41 9)   3  Arthralgia Of Shoulder Region (719 41)   4  Back pain (724 5) (M54 9)   5  Bereavement (V62 82) (Z63 4)   6  Bilateral hip pain (719 45) (M25 551,M25 552)   7  Bipolar II disorder (296 89) (F31 81)   8  Chronic bilateral low back pain with bilateral sciatica (724 2,724 3,338 29)   (M54 42,M54 41,G89 29)   9  Chronic pain of both knees (675 24,843 25) (M25 561,M25 562,G89 29)   10  Chronic pain syndrome (338 4) (G89 4)   11  Cognitive disorder (294 9) (F09)   12  Esophageal reflux (530 81) (K21 9)   13  Fatigue (780 79) (R53 83)   14  Female pelvic pain (625 9) (R10 2)   15  Fibromyalgia (729 1) (M79 7)   16  Generalized anxiety disorder (300 02) (F41 1)   17  Hypertension (401 9) (I10)   18  Hypokalemia (276 8) (E87 6)   19  Insomnia (780 52) (G47 00)   20  Lumbar spondylosis (721 3) (M47 816)   21  Major depressive disorder, recurrent episode, moderate degree (296 32) (F33 1)   22  Migraine (346 90) (G43 909)   23   History of Need for prophylactic vaccination and inoculation against influenza (V04 81)    (Z23)   24  Opioid dependence (304 00) (F11 20)   25  Osteoarthritis of both hips, unspecified osteoarthritis type (715 95) (M16 0)   26  Osteoarthritis of knee (715 36) (M17 10)   27  Overactive bladder (596 51) (N32 81)   28  Pain, joint, hip (719 45) (M25 559)   29  Panic disorder without agoraphobia (300 01) (F41 0)   30  Post traumatic stress disorder (309 81) (F43 10)   31  Primary localized osteoarthritis of both knees (715 16) (M17 0)   32  Recent unexplained weight loss (783 21) (R63 4)   33  Right knee pain (719 46) (M25 561)   34  Sacroiliitis (720 2) (M46 1)   35  Tremor (781 0) (R25 1)   36  Urinary incontinence (788 30) (R32)   37  Vitamin D deficiency (268 9) (E55 9)    Current Meds   1  Acetaminophen-Codeine #3 300-30 MG Oral Tablet; TAKE 1 TABLET TWICE DAILY AS   NEEDED FOR PAIN;   Therapy: 00LHF2291 to (Evaluate:91Mck4359); Last Rx:10May2017 Ordered   2  AmLODIPine Besylate 10 MG Oral Tablet; TAKE 1 TABLET DAILY; Therapy: 98YBN0046 to (Evaluate:00Ibc8567)  Requested for: 69KUY1547; Last   Rx:41Xki0982 Ordered   3  Aspirin 81 MG Oral Tablet Delayed Release; take 1 tablet by mouth daily; Therapy: 27YEI7255 to (Evaluate:23Aca0104)  Requested for: 51OMG6806; Last   Rx:76Mpc6011 Ordered   4  Diclofenac Potassium 50 MG Oral Tablet; TAKE 1 TABLET TWICE DAILY AFTER MEALS; Therapy: 65JEA6065 to (Daily Verma)  Requested for: 94NGS2225; Last   Rx:09Jan2017 Ordered   5  Gabapentin 400 MG Oral Capsule; TAKE 1 CAPSULE AT BEDTIME; Therapy: 28PVI4321 to (Evaluate:46Wex4873)  Requested for: 54FSL6858; Last   Rx:14Jun2017 Ordered   6  LORazepam 2 MG Oral Tablet; TAKE 1 TABLET 3 TIMES DAILY; Therapy: 58OMK5585 to (Evaluate:43Xwc4002); Last Rx:24May2017 Ordered   7  Meloxicam 7 5 MG Oral Tablet; Take 1 tablet twice daily as needed; Therapy: 38BTF6659 to (Evaluate:65Yur3171)  Requested for: 21Jun2017; Last   XD:52TVV8782 Ordered   8   Morphine Sulfate ER 15 MG Oral Tablet Extended Release; take 1 po in am;   Therapy: 55WMM2226 to (Evaluate:11Rpc3965); Last Rx:19Jun2017 Ordered   9  Omeprazole 20 MG Oral Capsule Delayed Release; TAKE 1 CAPSULE DAILY EVERY   MORNING BEFORE BREAKFAST; Therapy: 41RXZ7647 to (Evaluate:87Rrr2990)  Requested for: 25KVB2097; Last   Rx:18May2017 Ordered   10  Primidone 250 MG Oral Tablet; TAKE 1 TABLET AT BEDTIME; Therapy: 12IFA3386 to (Evaluate:74Fvj4701)  Requested for: 11VBS1373; Last    Rx:08Nzz2187 Ordered   11  Propranolol HCl - 20 MG Oral Tablet; Take 1 tablet twice daily; Therapy: 50LZJ2829 to (Evaluate:13May2018)  Requested for: 81YTI6274; Last    Rx:18May2017 Ordered   12  RisperiDONE 2 MG Oral Tablet (RisperDAL); TAKE 1 TABLET Bedtime; Therapy: 26AJO3062 to (Evaluate:84Hbc8095)  Requested for: 12SBV4912; Last    Rx:24May2017 Ordered   13  TiZANidine HCl - 2 MG Oral Tablet; Take 1 po tid prn spasms; Therapy: 42NFB4656 to (Evaluate:39Ppq0099)  Requested for: 02SFR8648; Last    Rx:88Rgf2896 Ordered   14  TraZODone HCl - 150 MG Oral Tablet; TAKE 1 TABLET Once At Bedtime; Therapy: 43KMP9476 to (Evaluate:62Wqa3201)  Requested for: 62RGK5271; Last    Rx:24May2017 Ordered   15  Venlafaxine HCl - 25 MG Oral Tablet; TAKE 1 TABLET 3 TIMES DAILY WITH FOOD; Therapy: 41TWO4467 to (Evaluate:02Pmc6195)  Requested for: 72VFP5340; Last    Rx:85Aca3583 Ordered   16  VESIcare 10 MG Oral Tablet; Take 1 tablet daily; Therapy: 00MDQ6106 to (Heide Mendoza)  Requested for: 84IWV0279; Last    Rx:31May2017 Ordered   17  Vitamin D3 1000 UNIT Oral Capsule; TAKE 1 CAPSULE DAILY; Therapy: 23GKD4933 to (Evaluate:92Mba0400)  Requested for: 88ZHA6782; Last    Rx:27Zmg9239 Ordered    Allergies    1   No Known Drug Allergies    Signatures   Electronically signed by : Krista Lang, ; Jul 17 2017  3:25PM EST                       (Author)

## 2018-01-18 NOTE — RESULT NOTES
Message   Recorded as Task   Date: 01/10/2017 10:11 AM, Created By: Reyes Batman   Task Name: Call Back   Assigned To: Carolina Reno   Regarding Patient: Maldonado Shah, Status: Active   Comment:    Carolina Reno - 10 Nikolay 2017 10:11 AM     TASK CREATED  Caller: Self; General Medical Question; [phone] (Home); [phone] (Work)  Received a Marco Brown from [facility] [phone] today from the pt  stating she had Bilateral knee injections done on 12/19 and she is scheduled to have an epidural with you on 1/18  She wanted you to be aware and wanted to make sure that it was ok to have it done  JW to advise  Mag Garcia - 10 Nikolay 2017 1:15 PM     TASK REPLIED TO: Previously Assigned To 1 Yessenia Cleveland  It is okay to proceed with the injection since this is about a month apart   Carolina Reno - 10 Nikolay 2017 3:01 PM     TASK EDITED  S/w the pt  and she is aware          Signatures   Electronically signed by : Nohelia Sanchez, ; Nikolay 10 2017  3:01PM EST                       (Author)

## 2018-01-18 NOTE — PSYCH
Treatment Plan Tracking    #1 Treatment Plan not completed within required time limits due to: Client cancelled/ no-showed scheduled appointment , Other: ( Pt Cx past appointments, as she had to be out-of town for her Brother's death/ Memorial service/ and then to help her sister with her father's house (where brother was living)  Pt did her Treatment Plan today at Therapy session on 6 / 7 / 17             Signatures   Electronically signed by : Yamile Mccabe MSAPRNPMHCNSLishaBC; Jun 7 2017 11:57AM EST                       (Author)

## 2018-01-22 VITALS
BODY MASS INDEX: 26.03 KG/M2 | DIASTOLIC BLOOD PRESSURE: 76 MMHG | HEART RATE: 68 BPM | WEIGHT: 137.75 LBS | SYSTOLIC BLOOD PRESSURE: 123 MMHG

## 2018-01-22 VITALS
DIASTOLIC BLOOD PRESSURE: 60 MMHG | HEIGHT: 61 IN | WEIGHT: 136 LBS | TEMPERATURE: 97.9 F | SYSTOLIC BLOOD PRESSURE: 100 MMHG | RESPIRATION RATE: 20 BRPM | BODY MASS INDEX: 25.68 KG/M2 | HEART RATE: 88 BPM

## 2018-01-23 VITALS
TEMPERATURE: 98.1 F | DIASTOLIC BLOOD PRESSURE: 89 MMHG | WEIGHT: 136 LBS | HEART RATE: 61 BPM | BODY MASS INDEX: 25.68 KG/M2 | SYSTOLIC BLOOD PRESSURE: 145 MMHG | HEIGHT: 61 IN

## 2018-01-23 NOTE — RESULT NOTES
Verified Results  * MRI LUMBAR SPINE WO CONTRAST [id number] 12:26PM Ramsey Gasca Order Number: [id number]    - Patient Instructions: To schedule this appointment, please contact Central Scheduling at [phone]  Test Name Result Flag Reference   MRI LUMBAR SPINE [address] (Report)     MRI LUMBAR SPINE WITHOUT CONTRAST     INDICATION: M54 16: Radiculopathy, lumbar region  History taken directly from the electronic ordering system  COMPARISON: None  TECHNIQUE: Sagittal T1, sagittal T2, sagittal inversion recovery, axial T1 and axial T2, coronal T2       IMAGE QUALITY: Diagnostic     FINDINGS:   Counting reference: Lumbosacral Junction For the purposes of this report, L4-5 is considered the level of the iliac crest      ALIGNMENT: Levoscoliosis with apex at L2  MARROW SIGNAL: Marrow signal is within normal limits without signs of an infiltrative process  Scattered type I endplate changes seen at L3-L4 and L4-L5  Edema extending into the L5 pedicle reflective of stress reaction  DISTAL CORD AND CONUS: Normal size and signal within the distal cord and conus  The conus ends at the L2 level  PARASPINAL SOFT TISSUES: Mild bladder distention  Cholelithiasis  Diverticulosis  SACRUM: Normal signal within the sacrum  No evidence of insufficiency or stress fracture  LOWER THORACIC DISC SPACES: Bulging the annulus  No significant spinal canal stenosis  No right and no left neural foraminal stenosis  LUMBAR DISC SPACES:        L1-L2: No significant spinal canal stenosis  No right and no left neural foraminal stenosis  L2-L3: Circumferential disc bulge  Right foraminal protrusion  No significant spinal canal stenosis  Mild to moderate right and no significant left neural foraminal stenosis  L3-L4: Circumferential disc bulge and bilateral facet arthropathy, eccentric to the right  Small bilateral cysts synovial cyst formation  Mild spinal canal stenosis   Severe right and no significant left neural foraminal stenosis  L4-L5: Circumference disc bulge and bilateral facet arthropathy  Central disc extrusion  Dorsal synovial cyst formation  Moderate spinal canal stenosis  Mild right and severe left neural foraminal stenosis  L5-S1: Bilateral facet arthropathy left greater than right  No significant spinal canal stenosis  No right and moderate left neural foraminal stenosis  IMPRESSION:     Levoscoliosis with resultant lumbar spondylosis  Severe degrees of foraminal stenosis at L3-L4 and L4-L5 as detailed  Moderate spinal canal stenosis at L4-5         Workstation performed: [id number]     Signed by:   Glory Daily MD   12/20/17

## 2018-01-23 NOTE — MISCELLANEOUS
Message   Recorded as Task   Date: 12/11/2017 10:06 AM, Created By: System   Task Name: Schedule Appointment   Assigned To: [facility] surgery sched,Team   Regarding Patient: Jamee Mckinney, Status: Active   Comment:    System - 11 Dec 2017 10:06 AM        Dasha Sarmiento - 11 Dec 2017 10:06 AM     TASK REASSIGNED: Previously Assigned To Vikash Kendall  PT [address]; EFF 06/01/14; AUTH RQ'D MRI L-SPINE WO CONTRAST, OBTAINED THRU [facility] - APPROVED; Val Morgan #[id number]; VALID 12/13/17 - 02/11/2018  SENT TO [facility] TO SCHED APPT @ [facility]  Annalise Love THANK YOU    Shweta Herrera - 11 Dec 2017 3:38 PM     TASK IN PROGRESS   Shweta Christian - 11 Dec 2017 3:53 PM     TASK EDITED  Billy Isidro 12/19/17 12:30PM @ [facility]        Active Problems    1  Analgesic use (V58 69) (Z79 899)   2  Anxiety (300 00) (F41 9)   3  Arthralgia Of Shoulder Region (719 41)   4  Back pain (724 5) (M54 9)   5  Bereavement (V62 82) (Z63 4)   6  Bilateral hip pain (719 45) (M25 551,M25 552)   7  Bipolar II disorder (296 89) (F31 81)   8  Chronic bilateral low back pain with bilateral sciatica (724 2,724 3,338 29)   (M54 42,M54 41,G89 29)   9  Chronic pain of both knees (380 27,439 06) (M25 561,M25 562,G89 29)   10  Chronic pain syndrome (338 4) (G89 4)   11  Cognitive disorder (294 9) (F09)   12  Esophageal reflux (530 81) (K21 9)   13  Fatigue (780 79) (R53 83)   14  Female pelvic pain (625 9) (R10 2)   15  Fibromyalgia (729 1) (M79 7)   16  Generalized anxiety disorder (300 02) (F41 1)   17  Hypertension (401 9) (I10)   18  Hypokalemia (276 8) (E87 6)   19  Insomnia (780 52) (G47 00)   20  Lumbar radiculopathy (724 4) (M54 16)   21  Lumbar spondylosis (721 3) (M47 816)   22  Major depressive disorder, recurrent episode, moderate degree (296 32) (F33 1)   23  Migraine (346 90) (G43 909)   24  History of Need for prophylactic vaccination and inoculation against influenza (V04 81)    (Z23)   25   Opioid dependence (304 00) (F11 20) 26  Osteoarthritis of both hips, unspecified osteoarthritis type (715 95) (M16 0)   27  Osteoarthritis of knee (715 36) (M17 10)   28  Overactive bladder (596 51) (N32 81)   29  Pain, joint, hip (719 45) (M25 559)   30  Panic disorder without agoraphobia (300 01) (F41 0)   31  Post traumatic stress disorder (309 81) (F43 10)   32  Primary localized osteoarthritis of both knees (715 16) (M17 0)   33  Recent unexplained weight loss (783 21) (R63 4)   34  Right knee pain (719 46) (M25 561)   35  Sacroiliitis (720 2) (M46 1)   36  Tremor (781 0) (R25 1)   37  Urinary incontinence (788 30) (R32)   38  Vitamin D deficiency (268 9) (E55 9)    Current Meds   1  Acetaminophen-Codeine #3 300-30 MG Oral Tablet; TAKE 1 TABLET TWICE DAILY AS   NEEDED FOR PAIN;   Therapy: 20WII0997 to (Evaluate:40Rct6839); Last Rx:02Jri5801 Ordered   2  AmLODIPine Besylate 10 MG Oral Tablet; TAKE 1 TABLET DAILY; Therapy: 47GQC5118 to (Evaluate:73Jdt8951)  Requested for: 67PEY2370; Last   Rx:77Qgc2772 Ordered   3  Aspirin 81 MG Oral Tablet Delayed Release; take 1 tablet by mouth daily; Therapy: 28INZ0666 to (Evaluate:07Dkl6791)  Requested for: 40ARH4749; Last   Rx:63Kjm8108 Ordered   4  Gabapentin 300 MG Oral Capsule; Take one capsule at bedtime with one 600 mg tablet   to equal a total of 900mg at bedtime for leg pain; Therapy: 99JAP7984 to ([facility] [place])  Requested for: 62CBW2448; Last   Rx:11Fez8769 Ordered   5  Gabapentin 600 MG Oral Tablet; Take 1 po hs; Therapy: 20RBY9857 to (Evaluate:08Mar2018)  Requested for: 02LXS0709; Last   Rx:57Itp9924 Ordered   6  LORazepam 2 MG Oral Tablet; TAKE 1 TABLET THREE TIMES A DAY AS NEEDED *DO   NOT FILL UNTIL 8/21 PERMD FOR TRAVEL*;   Therapy: 10QKV7012 to (Evaluate:34Bpz0723)  Requested for: 89DWP4736; Last   Rx:07Nov2017 Ordered   7  Meloxicam 7 5 MG Oral Tablet; Take 1 tablet twice daily as needed; Therapy: 25SZM7059 to (Last Rx:06Oct2017)  Requested for: 79ZPH5387 Ordered   8   Morphine Sulfate ER 15 MG Oral Tablet Extended Release; take 1 po in am;   Therapy: 99HDR4420 to (Evaluate:64Hqx0059); Last Rx:78Jyi6569 Ordered   9  Omeprazole 20 MG Oral Capsule Delayed Release; TAKE 1 CAPSULE DAILY EVERY   MORNING BEFORE BREAKFAST; Therapy: 31ALT8837 to (Evaluate:30Ipf2399)  Requested for: 81NGX8033; Last   Rx:28Odx9018 Ordered   10  Primidone 250 MG Oral Tablet; TAKE 1 TABLET AT BEDTIME; Therapy: 18ADM4111 to ([facility])  Requested for: 93Shj2977; Last    Rx:28Eqm8430 Ordered   11  Propranolol HCl - 20 MG Oral Tablet; Take 1 tablet twice daily; Therapy: 27PSS0950 to (Evaluate:13May2018)  Requested for: 75LDE9689; Last    Rx:97Sot5936 Ordered   12  RisperiDONE 2 MG Oral Tablet (RisperDAL); TAKE 1 TABLET Bedtime; Therapy: 26PCX0913 to ([facility])  Requested for: 36FQH1076; Last    Rx:07Nov2017 Ordered   13  TiZANidine HCl - 2 MG Oral Tablet; Take 1 po tid prn spasms; [facility]L8354 [facility]  Requested for: 90AWT7613; Last    Rx:08Flb3179 Ordered   14  TraZODone HCl - 150 MG Oral Tablet; TAKE 1 TABLET Once At Bedtime; Therapy: 44VGP7033 to ([facility])  Requested for: 24PYK3650; Last    Rx:07Nov2017 Ordered   15  Venlafaxine HCl - 25 MG Oral Tablet; TAKE 1 TABLET 3 TIMES DAILY WITH FOOD; Therapy: 33HOG1086 to ([facility])  Requested for: 74NLL1591; Last    Rx:07Nov2017 Ordered   16  VESIcare 10 MG Oral Tablet; Take 1 tablet daily; Therapy: 53RUV6011 to ([facility])  Requested for: 98TGJ4684; Last    Rx:39Cuv2001 Ordered   17  Vitamin D3 1000 UNIT Oral Capsule; TAKE 1 CAPSULE DAILY; Therapy: 33ATT7507 to (Evaluate:13May2018)  Requested for: 70RAE7140; Last    Rx:49Sdd0546 Ordered    Allergies    1   No Known Drug Allergies    Signatures   Electronically signed by : Dwain Vega, ; Dec 12 2017  7:33AM EST                       (Author)

## 2018-01-24 VITALS
DIASTOLIC BLOOD PRESSURE: 72 MMHG | SYSTOLIC BLOOD PRESSURE: 106 MMHG | HEIGHT: 61 IN | BODY MASS INDEX: 25.68 KG/M2 | HEART RATE: 58 BPM | WEIGHT: 136 LBS

## 2018-01-26 ENCOUNTER — HOSPITAL ENCOUNTER (OUTPATIENT)
Dept: RADIOLOGY | Facility: CLINIC | Age: 53
Discharge: HOME | End: 2018-01-26
Attending: ANESTHESIOLOGY
Payer: COMMERCIAL

## 2018-01-26 VITALS
DIASTOLIC BLOOD PRESSURE: 82 MMHG | TEMPERATURE: 97.4 F | RESPIRATION RATE: 20 BRPM | OXYGEN SATURATION: 100 % | HEART RATE: 82 BPM | SYSTOLIC BLOOD PRESSURE: 121 MMHG

## 2018-01-26 DIAGNOSIS — M51.16 INTERVERTEBRAL DISC DISORDER WITH RADICULOPATHY OF LUMBAR REGION: ICD-10-CM

## 2018-01-26 DIAGNOSIS — M51.26 DISPLACEMENT OF LUMBAR INTERVERTEBRAL DISC WITHOUT MYELOPATHY: ICD-10-CM

## 2018-01-26 DIAGNOSIS — M54.16 LUMBAR RADICULOPATHY: ICD-10-CM

## 2018-01-26 PROCEDURE — 64483 NJX AA&/STRD TFRM EPI L/S 1: CPT | Performed by: ANESTHESIOLOGY

## 2018-01-26 RX ORDER — LIDOCAINE HYDROCHLORIDE 10 MG/ML
5 INJECTION, SOLUTION EPIDURAL; INFILTRATION; INTRACAUDAL; PERINEURAL ONCE
Status: COMPLETED | OUTPATIENT
Start: 2018-01-26 | End: 2018-01-26

## 2018-01-26 RX ORDER — LIDOCAINE HYDROCHLORIDE 20 MG/ML
2 INJECTION, SOLUTION EPIDURAL; INFILTRATION; INTRACAUDAL; PERINEURAL ONCE
Status: COMPLETED | OUTPATIENT
Start: 2018-01-26 | End: 2018-01-26

## 2018-01-26 RX ADMIN — LIDOCAINE HYDROCHLORIDE 2 ML: 20 INJECTION, SOLUTION EPIDURAL; INFILTRATION; INTRACAUDAL; PERINEURAL at 13:41

## 2018-01-26 RX ADMIN — LIDOCAINE HYDROCHLORIDE 4 ML: 10 INJECTION, SOLUTION EPIDURAL; INFILTRATION; INTRACAUDAL; PERINEURAL at 13:41

## 2018-01-26 RX ADMIN — DEXAMETHASONE SODIUM PHOSPHATE 15 MG: 10 INJECTION INTRAMUSCULAR; INTRAVENOUS at 13:36

## 2018-01-26 RX ADMIN — IOHEXOL 2 ML: 300 INJECTION, SOLUTION INTRAVENOUS at 13:37

## 2018-01-26 NOTE — H&P
History of Present Illness: The patient is a 45 y o  female who presents with complaints of low back and leg pain  There is no problem list on file for this patient  Past Medical History:   Diagnosis Date    Acid reflux     Anxiety     Arthritis     Bipolar 2 disorder (HCC)     Depression     Familial tremor     both hands    Fibromyalgia     Hearing aid worn     left ear    [place] (hard of hearing)     left ear    Hypertension     Left-sided weakness     Lower back pain     Memory loss of unknown cause     long and short term    Migraine     Overactive bladder     Panic attack     Post traumatic stress disorder     Seasonal allergies     Stroke [facility])     questionable stroke 2009    Urinary incontinence     Wears dentures     partial lower / full upper    Wears glasses        Past Surgical History:   Procedure Laterality Date    COLONOSCOPY      HYSTERECTOMY      MYRINGOTOMY W/ TUBES Left     AR CYSTOURETHROSCOPY N/A 2/18/2016    Procedure: CYSTOSCOPY, BOTOX INJECTION;  Surgeon: Wayne Sanchez MD;  Location: [facility];  Service: Gynecology    TONSILLECTOMY      TUBAL LIGATION           Current Outpatient Prescriptions:     amLODIPine (NORVASC) 10 mg tablet, Take 10 mg by mouth daily  , Disp: , Rfl:     aspirin 81 MG tablet, Take 81 mg by mouth daily  , Disp: , Rfl:     baclofen 10 mg tablet, Take 10 mg by mouth daily at bedtime  , Disp: , Rfl:     Cholecalciferol (VITAMIN D3) 1000 UNITS CAPS, Take 1 tablet by mouth daily  , Disp: , Rfl:     HYDROcodone-acetaminophen (NORCO) 5-325 mg per tablet, Take 1 tablet by mouth every 6 (six) hours as needed for severe pain for up to 2 doses   Acute therapy Max Daily Amount: 4 tablets, Disp: 2 tablet, Rfl: 0    ibuprofen (MOTRIN) 800 mg tablet, Take 800 mg by mouth every 6 (six) hours as needed for mild pain , Disp: , Rfl:     LORazepam (ATIVAN) 2 mg tablet, Take 2 mg by mouth 3 (three) times a day , Disp: , Rfl:     omeprazole (PriLOSEC) 20 mg delayed release capsule, Take 20 mg by mouth daily  , Disp: , Rfl:     primidone (MYSOLINE) 250 mg tablet, Take 250 mg by mouth daily at bedtime  , Disp: , Rfl:     propranolol (INDERAL) 20 mg tablet, Take 20 mg by mouth 2 (two) times a day , Disp: , Rfl:     risperiDONE (RisperDAL) 2 mg tablet, Take 2 mg by mouth daily at bedtime  , Disp: , Rfl:     traMADol (ULTRAM) 50 mg tablet, Take 50 mg by mouth every 4 (four) hours as needed for moderate pain , Disp: , Rfl:     traZODone (DESYREL) 150 mg tablet, Take 150 mg by mouth daily at bedtime  , Disp: , Rfl:     venlafaxine (EFFEXOR) 25 mg tablet, Take 25 mg by mouth 3 (three) times a day , Disp: , Rfl:     Current Facility-Administered Medications:     dexamethasone (DECADRON) injection 20 mg, 20 mg, Intramuscular, Once, Vikash Kendall, DO    iohexol (OMNIPAQUE) 300 mg/mL injection 50 mL, 50 mL, Other, Once in imaging, Salome Notice, DO    lidocaine (PF) (XYLOCAINE-MPF) 1 % injection 5 mL, 5 mL, Injection, Once, Vikash Kendall, DO    lidocaine (PF) (XYLOCAINE-MPF) 2 % injection 2 mL, 2 mL, Injection, Once, Vikash Kendall, DO    No Known Allergies    Physical Exam:   Vitals:    01/26/18 1256   BP: 121/81   Pulse: 63   Resp: 16   Temp: (!) 97 4 °F (36 3 °C)   SpO2: 97%     General: Awake, Alert, Oriented x 3, Mood and affect appropriate  Respiratory: Respirations even and unlabored  Cardiovascular: Peripheral pulses intact; no edema  Musculoskeletal Exam:  Bilateral lumbar paraspinals tender to palpation    ASA Score: 2    Assessment:   1  Displacement of lumbar intervertebral disc without myelopathy    2  Intervertebral disc disorder with radiculopathy of lumbar region        Plan: B/L L4 TFESI      Assessment     1  Lumbar radiculopathy (724 4) (M54 16)   2  Lumbar spondylosis (721 3) (M47 816)   3  Fibromyalgia (729 1) (M79 7)   4   Chronic pain syndrome (338 4) (G89 4)     Plan  Chronic bilateral low back pain with bilateral sciatica    · Renew: Gabapentin 300 MG Oral Capsule; Take one capsule at bedtime with one 600mg tablet to equal a total of 900mg at bedtime for leg pain   Rx By: Wagner Kay; Dispense: 30 Days ; #:30 X 90 Capsule Bottle; Refill: 2;: Chronic bilateral low back pain with bilateral sciatica; MARGARITO = N; Verified Transmission to [facility] #[id number]; Last Updated By: System, [employer]; 12/8/2017 2:28:34 PM  Chronic pain syndrome    · Renew: Morphine Sulfate ER 15 MG Oral Tablet Extended Release; take 1 po in am; DoNot Fill Before: [id number]   Rx By: Wagner Kay; Dispense: 30 Days ; #:30 Tablet Extended Release; Refill: 0;For: Chronic pain syndrome; MARGARITO = N; Print Rx  Chronic pain syndrome, Fibromyalgia    · Renew: TiZANidine HCl - 2 MG Oral Tablet; Take 1 po tid prn spasms   Rx By: Wagner Kay; Dispense: 30 Days ; #:90 Tablet; Refill: 2;: Chronic pain syndrome, Fibromyalgia; MARGARITO = N; Verified Transmission to [facility] #[id number]; Last Updated By: System, [employer]; 12/8/2017 2:28:40 PM  Lumbar radiculopathy    · * MRI LUMBAR SPINE WO CONTRAST; Status:Need Information - FinancialAuthorization; Requested for:[id number];    Perform:[facility]; [phone]; Ordered; For:Lumbar radiculopathy; Ordered By:Vikash Kendall;  Lumbar spondylosis    · Renew: Gabapentin 600 MG Oral Tablet; Take 1 po hs   Rx By: Wagner Kay; Dispense: 30 Days ; #:30 Tablet; Refill: 2;: Lumbar spondylosis; MARGARITO = N; Verified Transmission to [facility]ARM[facility]Y #[id number]; Last Updated By: System, [employer]; 12/8/2017 2:28:35 PM  Osteoarthritis of both hips, unspecified osteoarthritis type    · Renew: Acetaminophen-Codeine #3 300-30 MG Oral Tablet; TAKE 1 TABLET TWICEDAILY AS NEEDED FOR PAIN; Do Not Fill Before: 12Dec2017   Rx By: Wagner Kay; Dispense: 30 Days ; #:60 Tablet;  Refill: 1;: Osteoarthritis of both hips, unspecified osteoarthritis type; MARGARITO = N; Print Rx    56-year-old female with a history of fibromyalgia returning for follow-up of lumbosacral back pain with radiculitis in the L4-5 distributions of bilateral lower extremities  The patient does have positive straight leg raise bilaterally  Her low back pain seems to be multifactorial with contributing factors including myofascial pain, a facet mediated component, and possible neuropathic component  The patient manages her pain with morphine ER 15 mg daily, Tylenol No  3 b i d  p r n  for breakthrough pain, gabapentin 900 mg q h s , and tizanidine 2 mg q 8 hours p r n  Michael Daniels She was taking meloxicam, however did not find this very effective  She had also previously tried numerous NSAIDs  The seem to be ineffective  Also of note, the patient does take lorazepam for anxiety and I did talk with her once again about speaking with her psychiatrist to find a non benzodiazepine anxiolytic  1  I will order an MRI of the patient's lumbar spine 2  we will continue morphine ER 15 mg daily  The patient states that this medication is effective at helping to manage her pain and complete her activities of daily living  She is not exhibiting any Aspirin behavior at this time  prescription drug monitoring program report was reviewed and appropriate for what is prescribed  The risks and side effects of chronic opioid treatment were discussed in detail with the patient  Side effects include, but are not limited to: nausea, vomiting, GI intolerance, sedation, constipation, mental clouding, opioid induced hyperalgesia, endocrine dysfunction, addiction, dependence, and tolerance  The patient was asked to take their medications only as prescribed and directed, never in excess, and never for any other reason other than for pain control  The patient was also asked to keep his medications out of the reach of others and away from children, preferably in a locked drawer  The patient verbalized understanding and wished to use these opioid medications    the patient was given prescriptions with a do not fill day before December 14, 2017 and 1 for January 13 2018    3  We will continue Tylenol No  3 b i d  p r n  for breakthrough pain 4  Will continue tizanidine 2 mg q 8 hours p r n  for myofascial pain 5  Will continue gabapentin 900 mg q h s  and we will hold off on increasing the secondary to daytime drowsiness with daytime dosing 6  I will follow up the patient in 2 months 7  Patient will continue with her home exercise program   Discussion/Summary  The patient has the current Goals: Reduced pain and improved function  The patent has the current Barriers: Fibromyalgia  Patient is able to Self-Care  Possible side effects of new medications were reviewed with the patient/guardian today  The treatment plan was reviewed with the patient/guardian  The patient/guardian understands and agrees with the treatment plan   The patient was counseled regarding diagnostic results,-- instructions for management,-- risk factor reductions,-- prognosis,-- patient and family education,-- impressions,-- risks and benefits of treatment options-- and-- importance of compliance with treatment  total time of encounter was 15 minutes       Self Referrals: No      Chief Complaint     1  Back Pain  Low back and leg pain      History of Present Illness  12-year-old female with a history of fibromyalgia returning for follow-up of lumbosacral back pain that radiates into bilateral lower extremities in the L4-5 distribution down to mid shin  She denies any numbness, paresthesias, or subjective weakness  She denies any bladder or bowel incontinence or saddle anesthesia  X-ray of her lumbar spine reveals degenerative disc disease, scoliosis, and spondylosis  She has had lumbar facet joint injections without much relief  She has also had SI joint injections and a right hip injection which did not provide any significant her sustainable relief  She is currently utilizing morphine ER 15 mg daily and Tylenol No  3 b i d  p r n  for breakthrough pain  She does take gabapentin 900 mg q h s  and tizanidine 2 mg q 8 hours p r n  with about 30% of pain relief  she denies any side effects from the medications other than some drowsiness with daytime doses of gabapentin, which is what she takes the medication at bedtime only  patient rates her pain a 9/10 on the pain is worse in the morning in the evening  The pain is constant and described as burning, throbbing, cramping, and shooting  The pain is worse with standing and walking and lifting  The pain is alleviated with relaxation and lying down  I have personally reviewed and/or updated the patient's past medical history, past surgical history, family history, social history, allergies, and vital signs today  than as stated above, the patient denies any interval changes in medications, medical condition, mental condition, symptoms, or allergies since the last office visit       Mat Crowder presents with complaints of constant episodes of bilateral lower back pain, described as burning and throbbing, radiating to the bilateral thigh  On a scale of 1 to 10, the patient rates the pain as 9  Symptoms are worsening  Review of Systems   Constitutional: no fever,-- no recent weight gain-- and-- no recent weight loss  Eyes: no double vision-- and-- no blurry vision  Cardiovascular: no chest pain,-- no palpitations-- and-- no lower extremity edema  Respiratory: no complaints of shortness of breath-- and-- no wheezing  Musculoskeletal: difficulty walking,-- muscle weakness-- and-- joint stiffness, but-- no joint swelling,-- no limb swelling,-- no pain in extremity-- and-- no decreased range of motion  Neurological: memory loss, but-- no dizziness,-- no difficulty swallowing,-- no loss of consciousness-- and-- no seizures  Gastrointestinal: constipation, but-- no nausea,-- no vomiting-- and-- no diarrhea  Genitourinary: no difficulty initiating urine stream,-- no genital pain-- and-- no frequent urination  Integumentary: no complaints of skin rash  Psychiatric: no depression  Endocrine: no excessive thirst,-- no adrenal disease,-- no hypothyroidism-- and-- no hyperthyroidism  Hematologic/Lymphatic: no tendency for easy bruising-- and-- no tendency for easy bleeding       ROS reviewed  Active Problems     1  Analgesic use (V58 69) (Z79 899)   2  Anxiety (300 00) (F41 9)   3  Arthralgia Of Shoulder Region (719 41)   4  Back pain (724 5) (M54 9)   5  Bereavement (V62 82) (Z63 4)   6  Bilateral hip pain (719 45) (M25 551,M25 552)   7  Bipolar II disorder (296 89) (F31 81)   8  Chronic bilateral low back pain with bilateral sciatica (724 2,724 3,338 29) (M54 42,M54 41,G89 29)   9  Chronic pain of both knees (244 23,049 68) (M25 561,M25 562,G89 29)   10  Chronic pain syndrome (338 4) (G89 4)   11  Cognitive disorder (294 9) (F09)   12  Esophageal reflux (530 81) (K21 9)   13  Fatigue (780 79) (R53 83)   14  Female pelvic pain (625 9) (R10 2)   15  Fibromyalgia (729 1) (M79 7)   16  Generalized anxiety disorder (300 02) (F41 1)   17  Hypertension (401 9) (I10)   18  Hypokalemia (276 8) (E87 6)   19  Insomnia (780 52) (G47 00)   20  Lumbar spondylosis (721 3) (M47 816)   21  Major depressive disorder, recurrent episode, moderate degree (296 32) (F33 1)   22  Migraine (346 90) (G43 909)   23  History of Need for prophylactic vaccination and inoculation against influenza (V04 81)  (Z23)   24  Opioid dependence (304 00) (F11 20)   25  Osteoarthritis of both hips, unspecified osteoarthritis type (715 95) (M16 0)   26  Osteoarthritis of knee (715 36) (M17 10)   27  Overactive bladder (596 51) (N32 81)   28  Pain, joint, hip (719 45) (M25 559)   29  Panic disorder without agoraphobia (300 01) (F41 0)   30  Post traumatic stress disorder (309 81) (F43 10)   31  Primary localized osteoarthritis of both knees (715 16) (M17 0)   32  Recent unexplained weight loss (783 21) (R63 4)   33  Right knee pain (719 46) (M25 561)   34  Sacroiliitis (720 2) (M46 1)   35   Tremor (781  0) (R25 1)   36  Urinary incontinence (788 30) (R32)   37  Vitamin D deficiency (268 9) (E55 9)     Past Medical History     1  History of Acute nonsuppurative otitis media, unspecified laterality (381 00) (H65 199)   2  History of Bipolar disorder (296 80) (F31 9)   3  History of Encounter for screening colonoscopy (V76 51) (Z12 11)   4  Fibromyalgia (729 1) (M79 7)   5  History of H/O colonoscopy (V45 89) (Z98 890)   6  History of nausea and vomiting (V12 79) (Z87 898)   7  History of stroke (V12 54) (Z86 73)   8  History of urinary tract infection (V13 02) (Z87 440)   9  History of Left knee pain (719 46) (M25 562)   10  History of Memory loss (780 93) (R41 3)   11  History of Mixed Anxiety Disorder (300 00)   12  History of Need for hepatitis C screening test (V73 89) (Z11 59)   13  History of Need for prophylactic vaccination and inoculation against influenza (V04 81)  (Z23)   14  History of Need for Tdap vaccination (V06 1) (Z23)   15  Personal history of arthritis (V13 4) (Z87 39)   16  History of Previous Spontaneous Vaginal Delivery   17  History of Screening for lipoid disorders (V77 91) (Z13 220)   18  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)   19  History of Visit for screening mammogram (V76 12) (Z12 31)     Surgical History  1  History of  Section   2  History of Ear Surgery   3  History of Hysterectomy   4  History of Tubal Ligation     Family History  Mother    1  Family history of Colon Cancer (V16 0)  Father    2  Family history of Alzheimer's disease (V17 2) (Z82 0)   3  Family history of cerebrovascular accident (V17 1) (Z82 3)  Son    4  Family history of seizures (V19 8) (Z84 89)  Brother    5  Family history of Colon cancer   6  Family history of bipolar disorder (V17 0) (Z81 8)  Maternal Aunt    7  Family history of Breast Cancer (V16 3)  Unknown    8  Family history of cardiac disorder (V17 49) (Z82 49)   9   Family history of diabetes mellitus (V18 0) (Z83 3)   10  Family history of hypertension (V17 49) (Z82 49)     Social History      · Being A Social Drinker   · Bereavement (V62 82) (Z63 4)   · Daily caffeine consumption, 6-8 servings a day   ·    · Education Level: Graduate school   · Lives in [place]   · Never A Smoker   · No drug use     Current Meds   1  Acetaminophen-Codeine #3 300-30 MG Oral Tablet; TAKE 1 TABLET TWICE DAILY AS NEEDED FOR PAIN; Therapy: 66FRU7581 to (Evaluate:40Ltb8212); Last Rx:13Oct2017 Ordered   2  AmLODIPine Besylate 10 MG Oral Tablet; TAKE 1 TABLET DAILY; Therapy: 00WDK2591 to (Evaluate:13May2018)  Requested for: 78GVH7206; Last Rx:18May2017 Ordered   3  Aspirin 81 MG Oral Tablet Delayed Release; take 1 tablet by mouth daily; Therapy: 55QIJ5846 to (Evaluate:19Feb2018)  Requested for: 32LDK2545; Last Rx:24Feb2017 Ordered   4  Gabapentin 300 MG Oral Capsule; Take one capsule at bedtime with one 600 mg tablet to equal a total of 900mg at bedtime for leg pain; Therapy: 56UJO1453 to ([facility][id number])  Requested for: 53ENM0010; Last Rx:13Oct2017 Ordered   5  Gabapentin 600 MG Oral Tablet; Take 1 po hs; Therapy: 09ZFC7896 to (Evaluate:11Jan2018)  Requested for: 13Oct2017; Last Rx:13Oct2017 Ordered   6  LORazepam 2 MG Oral Tablet; TAKE 1 TABLET THREE TIMES A DAY AS NEEDED *DO NOT FILL UNTIL 8/21 PERMD FOR TRAVEL*; Therapy: 08GRO2622 to (Evaluate:47Rju0442)  Requested for: 69JIX3019; Last Rx:07Nov2017 Ordered   7  Meloxicam 7 5 MG Oral Tablet; Take 1 tablet twice daily as needed; Therapy: 40TLS4242 to (Last Rx:06Oct2017)  Requested for: 98TXC5632 Ordered   8  Morphine Sulfate ER 15 MG Oral Tablet Extended Release; take 1 po in am; Therapy: 22BQV8829 to (Evaluate:12Nov2017); Last Rx:13Oct2017 Ordered   9  Omeprazole 20 MG Oral Capsule Delayed Release; TAKE 1 CAPSULE DAILY EVERY MORNING BEFORE BREAKFAST; Therapy: 45DWK9052 to (Evaluate:13May2018)  Requested for: 83XRR1730; Last Rx:18May2017 Ordered   10   Primidone 250 MG Oral Tablet; TAKE 1 TABLET AT BEDTIME; Therapy: 46XKA4343 to (Neftali Bee)  Requested for: 90Aho1330; Last  Rx:34Hiy3499 Ordered   11  Propranolol HCl - 20 MG Oral Tablet; Take 1 tablet twice daily; Therapy: 07ADN8572 to (Evaluate:22Bpw2554)  Requested for: 78DLY6431; Last  Rx:18May2017 Ordered   12  RisperiDONE 2 MG Oral Tablet; TAKE 1 TABLET Bedtime; Therapy: 18LVS6725 to (Nguyễn De La Torre)  Requested for: 32IIO1918; Last  Rx:07Nov2017 Ordered   13  TiZANidine HCl - 2 MG Oral Tablet; Take 1 po tid prn spasms; Therapy: 89SXB1892 to (EDVESYWP:97QAA3475)  Requested for: 66LIO5687; Last  Rx:13Oct2017 Ordered   14  TraZODone HCl - 150 MG Oral Tablet; TAKE 1 TABLET Once At Bedtime; Therapy: 47ASI7797 to (Nguyễn De La Torre)  Requested for: 31TWM9676; Last  Rx:07Nov2017 Ordered   15  Venlafaxine HCl - 25 MG Oral Tablet; TAKE 1 TABLET 3 TIMES DAILY WITH FOOD; Therapy: 42BXF2428 to (Nguyễn De La Torre)  Requested for: 71OQJ7566; Last  Rx:07Nov2017 Ordered   16  VESIcare 10 MG Oral Tablet; Take 1 tablet daily; Therapy: 32BYM9138 to (Alma Wade)  Requested for: 96XKY4715; Last  Rx:21Kpy3938 Ordered   17  Vitamin D3 1000 UNIT Oral Capsule; TAKE 1 CAPSULE DAILY; Therapy: 19DYT9119 to (Evaluate:97Hoy9488)  Requested for: 57FDF6544; Last  Rx:18May2017 Ordered     Allergies  1  No Known Drug Allergies     Vitals  Vital Signs         Physical Exam   Constitutional  General appearance: Well developed, well nourished, alert, in no distress, non-toxic and no overt pain behavior  Eyes  Sclera: anicteric  HEENT  Hearing grossly intact  Neck  Neck: Supple, symmetric, trachea midline, no masses  Pulmonary  Respiratory effort: Even and unlabored  Abdomen  Abdomen: Soft, non-tender, non-distended  Skin  Skin and subcutaneous tissue: Normal without rashes or lesions, well hydrated  Psychiatric  Mood and affect: Mood and affect appropriate     Neurologic  the muscle tone was normal  Musculoskeletal  Gait and station: Normal    Lumbar/Sacral Spine examination demonstrates  Bilateral lumbar paraspinals mildly tender to palpation with muscle spasms noted bilaterally  Bilateral lower extremity strength 5/5 in all muscle groups  Positive seated straight leg raise bilaterally

## 2018-01-26 NOTE — DISCHARGE INSTRUCTIONS
Epidural Steroid Injection   WHAT YOU NEED TO KNOW:   An epidural steroid injection (GABY) is a procedure to inject steroid medicine into the epidural space  The epidural space is between your spinal cord and vertebrae  Steroids reduce inflammation and fluid buildup in your spine that may be causing pain  You may be given pain medicine along with the steroids  ACTIVITY  · Do not drive or operate machinery today  · No strenuous activity today - bending, lifting, etc   · You may resume normal activites starting tomorrow - start slowly and as tolerated  · You may shower today, but no tub baths or hot tubs  · You may have numbness for several hours from the local anesthetic  Please use caution and common sense, especially with weight-bearing activities  CARE OF THE INJECTION SITE  · If you have soreness or pain, apply ice to the area today (20 minutes on/20 minutes off)  · Starting tomorrow, you may use warm, moist heat or ice if needed  · You may have an increase or change in your discomfort for 36-48 hours after your treatment  · Apply ice and continue with any pain medication you have been prescribed  · Notify [facility] if you have any of the following: redness, drainage, swelling, headache, stiff neck or fever above 100°F     SPECIAL INSTRUCTIONS  · Our office will contact you in approximately 7 days for a progress report  MEDICATIONS  · Continue to take all routine medications  · Our office may have instructed you to hold some medications  If you have a problem specifically related to your procedure, please call our office at [phone]  Problems not related to your procedure should be directed to your primary care physician

## 2018-02-02 ENCOUNTER — CLINICAL SUPPORT (OUTPATIENT)
Dept: PAIN MEDICINE | Facility: CLINIC | Age: 53
End: 2018-02-02
Payer: COMMERCIAL

## 2018-02-02 VITALS
HEART RATE: 58 BPM | BODY MASS INDEX: 26.36 KG/M2 | SYSTOLIC BLOOD PRESSURE: 123 MMHG | TEMPERATURE: 98.2 F | WEIGHT: 139.6 LBS | HEIGHT: 61 IN | DIASTOLIC BLOOD PRESSURE: 85 MMHG

## 2018-02-02 DIAGNOSIS — M79.7 FIBROMYALGIA: ICD-10-CM

## 2018-02-02 DIAGNOSIS — M51.16 LUMBAR DISC DISEASE WITH RADICULOPATHY: ICD-10-CM

## 2018-02-02 DIAGNOSIS — M47.816 LUMBAR SPONDYLOSIS: ICD-10-CM

## 2018-02-02 DIAGNOSIS — M48.062 SPINAL STENOSIS OF LUMBAR REGION WITH NEUROGENIC CLAUDICATION: ICD-10-CM

## 2018-02-02 DIAGNOSIS — M54.41 CHRONIC BILATERAL LOW BACK PAIN WITH BILATERAL SCIATICA: ICD-10-CM

## 2018-02-02 DIAGNOSIS — G89.4 CHRONIC PAIN DISORDER: ICD-10-CM

## 2018-02-02 DIAGNOSIS — M54.42 CHRONIC BILATERAL LOW BACK PAIN WITH BILATERAL SCIATICA: ICD-10-CM

## 2018-02-02 DIAGNOSIS — M54.16 LUMBAR RADICULOPATHY: Primary | ICD-10-CM

## 2018-02-02 DIAGNOSIS — G89.29 CHRONIC BILATERAL LOW BACK PAIN WITH BILATERAL SCIATICA: ICD-10-CM

## 2018-02-02 PROCEDURE — 99214 OFFICE O/P EST MOD 30 MIN: CPT | Performed by: ANESTHESIOLOGY

## 2018-02-02 RX ORDER — MELOXICAM 7.5 MG/1
7.5 TABLET ORAL 2 TIMES DAILY PRN
Qty: 60 TABLET | Refills: 2 | Status: SHIPPED | OUTPATIENT
Start: 2018-02-02 | End: 2018-03-21 | Stop reason: SDUPTHER

## 2018-02-02 RX ORDER — MORPHINE SULFATE 15 MG/1
15 TABLET, FILM COATED, EXTENDED RELEASE ORAL DAILY
Qty: 30 TABLET | Refills: 0 | Status: SHIPPED | OUTPATIENT
Start: 2018-03-16 | End: 2018-03-21 | Stop reason: SDUPTHER

## 2018-02-02 RX ORDER — TIZANIDINE 2 MG/1
TABLET ORAL
COMMUNITY
Start: 2017-03-22 | End: 2018-02-02 | Stop reason: SDUPTHER

## 2018-02-02 RX ORDER — GABAPENTIN 600 MG/1
TABLET ORAL
COMMUNITY
Start: 2017-08-09 | End: 2018-02-02 | Stop reason: SDUPTHER

## 2018-02-02 RX ORDER — GABAPENTIN 300 MG/1
300 CAPSULE ORAL
Qty: 30 CAPSULE | Refills: 2 | Status: SHIPPED | OUTPATIENT
Start: 2018-02-02 | End: 2018-03-21 | Stop reason: SDUPTHER

## 2018-02-02 RX ORDER — TIZANIDINE 2 MG/1
2 TABLET ORAL EVERY 8 HOURS PRN
Qty: 90 TABLET | Refills: 2 | Status: SHIPPED | OUTPATIENT
Start: 2018-02-02 | End: 2018-03-21 | Stop reason: SDUPTHER

## 2018-02-02 RX ORDER — ACETAMINOPHEN AND CODEINE PHOSPHATE 300; 30 MG/1; MG/1
1 TABLET ORAL 2 TIMES DAILY PRN
Qty: 60 TABLET | Refills: 1 | Status: SHIPPED | OUTPATIENT
Start: 2018-02-14 | End: 2018-03-21 | Stop reason: SDUPTHER

## 2018-02-02 RX ORDER — MORPHINE SULFATE 15 MG/1
15 TABLET, FILM COATED, EXTENDED RELEASE ORAL DAILY
Qty: 30 TABLET | Refills: 0 | Status: SHIPPED | OUTPATIENT
Start: 2018-02-02 | End: 2018-02-02 | Stop reason: SDUPTHER

## 2018-02-02 RX ORDER — SOLIFENACIN SUCCINATE 10 MG/1
1 TABLET, FILM COATED ORAL DAILY
COMMUNITY
Start: 2016-03-24 | End: 2018-03-13 | Stop reason: SDUPTHER

## 2018-02-02 RX ORDER — GABAPENTIN 600 MG/1
600 TABLET ORAL
Qty: 30 TABLET | Refills: 2 | Status: SHIPPED | OUTPATIENT
Start: 2018-02-02 | End: 2018-03-21 | Stop reason: SDUPTHER

## 2018-02-02 RX ORDER — GABAPENTIN 300 MG/1
CAPSULE ORAL
COMMUNITY
Start: 2017-10-13 | End: 2018-02-02 | Stop reason: SDUPTHER

## 2018-02-02 RX ORDER — MELOXICAM 7.5 MG/1
1 TABLET ORAL 2 TIMES DAILY PRN
COMMUNITY
Start: 2017-06-16 | End: 2018-02-02 | Stop reason: SDUPTHER

## 2018-02-02 RX ORDER — MORPHINE SULFATE 15 MG/1
TABLET, FILM COATED, EXTENDED RELEASE ORAL
COMMUNITY
Start: 2017-05-19 | End: 2018-02-02 | Stop reason: SDUPTHER

## 2018-02-02 RX ORDER — ACETAMINOPHEN AND CODEINE PHOSPHATE 300; 30 MG/1; MG/1
1 TABLET ORAL 2 TIMES DAILY PRN
COMMUNITY
Start: 2017-01-09 | End: 2018-02-02 | Stop reason: SDUPTHER

## 2018-02-02 RX ORDER — GABAPENTIN 300 MG/1
CAPSULE ORAL
Refills: 2 | COMMUNITY
Start: 2018-01-19 | End: 2018-02-02 | Stop reason: SDUPTHER

## 2018-02-02 NOTE — PROGRESS NOTES
Assessment:  1  Lumbar radiculopathy    2  Lumbar spondylosis    3  Chronic bilateral low back pain with bilateral sciatica    4  Chronic pain disorder    5  Fibromyalgia    6  Lumbar disc disease with radiculopathy    7  Spinal stenosis of lumbar region with neurogenic claudication        Plan:   61-year-old female with a history of fibromyalgia returning for follow-up of lumbosacral back pain with radiculopathy in the L4-5 distributions of bilateral lower extremities  The patient does have multilevel degenerative disc disease and spondylosis with varying degrees of central and foraminal stenosis and a disc herniation at L4-5  The patient is status post bilateral L4 TFESI January 26, 2018 and has noted some improvement in her low back and lower extremity pain  I did discuss with the patient that she needs to give it a little more time to see if she gets a delayed effect of the steroid as she only had the injection about a week ago  The patient verbalized understanding  She currently manages her pain with gabapentin 900 milligrams q h s , meloxicam 7 5 milligrams b i d  p r n , tizanidine 2 milligrams q 8 hours p r n , morphine ER 15 milligrams daily, and Tylenol No   3 b i d  p r n  for breakthrough pain  This regimen gives her approximately 30 percent relief  1   I will refill Morphine ER 15 milligrams daily x2 months and the patient was given a prescription for do not filled before February 14, 2018 and another for do not fill before March 16, 2018   2   I will continue Tylenol No   3 b i d  p r n  for breakthrough pain x2 more months and she was given prescriptions for do not fill before February 18, 2018 and another for do not fill before March 16, 2018  I advised the patient try minimize the use his medication as much as possible  3    I refilled meloxicam 7 5 milligrams b i d  p r n   4   The patient will continue with gabapentin 900 milligrams q h s  as she was unable tolerate daytime doses secondary to drowsiness for her neuropathic complaints  5  The patient will continue with tizanidine 2 milligrams q 8 hours p r n  for her myofascial pain  6  The patient will continue with her home exercise program  7  I will follow up the patient in 2 months    There are risks associated with opioid medications, including dependence, addiction and tolerance  The patient understands and agrees to use these medications only as prescribed  Potential side effects of the medications include, but are not limited to, constipation, drowsiness, addiction, impaired judgment and risk of fatal overdose if not taken as prescribed  The patient was warned against driving while taking sedation medications  Sharing medications is a felony  At this point in time, the patient is showing no signs of addiction, abuse, diversion or suicidal ideation  A urine drug screen was collected at today's office visit as part of our medication management protocol  The point of care testing results were appropriate for what was being prescribed  The specimen will be sent for confirmatory testing  The drug screen is medically necessary because the patient is either dependent on opioid medication or is being considered for opioid medication therapy and the results could impact ongoing or future treatment  The drug screen is to evaluate for the presences or absence of prescribed, non-prescribed, and/or illicit drugs/substances  [place] Prescription Drug Monitoring Program report was reviewed and was appropriate       My impressions and treatment recommendations were discussed in detail with the patient who verbalized understanding and had no further questions  Discharge instructions were provided  I personally saw and examined the patient and I agree with the above discussed plan of care  No orders of the defined types were placed in this encounter      New Medications Ordered This Visit   Medications    acetaminophen-codeine (TYLENOL #3) 300-30 mg per tablet     Sig: Take 1 tablet by mouth 2 (two) times a day as needed    gabapentin (NEURONTIN) 600 MG tablet     Sig: Take by mouth    meloxicam (MOBIC) 7 5 mg tablet     Sig: Take 1 tablet by mouth 2 (two) times a day as needed    morphine (MS CONTIN) 15 mg 12 hr tablet     Sig: Take by mouth    solifenacin (VESICARE) 10 MG tablet     Sig: Take 1 tablet by mouth daily    tiZANidine (ZANAFLEX) 2 mg tablet     Sig: Take by mouth    gabapentin (NEURONTIN) 300 mg capsule     Sig: Take by mouth       History of Present Illness:     41-year-old female with a history of fibromyalgia returning for follow-up of lumbosacral back pain with radiculopathy in the L4-5 distributions of bilateral lower extremities  The patient does have multilevel degenerative disc disease and spondylosis with varying degrees of central and foraminal stenosis and a disc herniation at L4-5  The patient is status post bilateral L4 TFESI January 26, 2018 and has noted some improvement in her low back and lower extremity pain  She denies any bladder or bowel incontinence or saddle anesthesia  She currently manages her pain with gabapentin 900 milligrams q h s , meloxicam 7 5 milligrams b i d  p r n , tizanidine 2 milligrams q 8 hours p r n , morphine ER 15 milligrams daily, and Tylenol No   3 b i d  p r n  for breakthrough pain  This regimen gives her approximately 30 percent relief in she denies any side effects from the medications  The patient rates her pain a 9/10 on the pain is worse in the morning and at night  The pain is constant and described as burning, dull, aching, throbbing, cramping, shooting, and numbness  The pain is worse with exercise, standing, walking, lifting, and bending and twisting at the waist   The pain is alleviated with lying down      I have personally reviewed and/or updated the patient's past medical history, past surgical history, family history, social history, allergies, and vital signs today  Other than as stated above, the patient denies any interval changes in medications, medical condition, mental condition, symptoms, or allergies since the last office visit  Review of Systems:    Review of Systems   Respiratory: Negative for shortness of breath  Cardiovascular: Negative for chest pain  Gastrointestinal: Negative for constipation, diarrhea, nausea and vomiting  Musculoskeletal: Negative for arthralgias, gait problem, joint swelling and myalgias  Difficulty walking, Decreased ROM, Joint stiffness    Skin: Negative for rash  Neurological: Negative for dizziness, seizures and weakness (Muscle )  All other systems reviewed and are negative  There is no problem list on file for this patient  Past Medical History:   Diagnosis Date    Acid reflux     Anxiety     Arthritis     Bipolar 2 disorder (HCC)     Depression     Familial tremor     both hands    Fibromyalgia     Hearing aid worn     left ear    [place] (hard of hearing)     left ear    Hypertension     Left-sided weakness     Lower back pain     Memory loss of unknown cause     long and short term    Migraine     Overactive bladder     Panic attack     Post traumatic stress disorder     Seasonal allergies     Stroke [facility])     questionable stroke 2009    Urinary incontinence     Wears dentures     partial lower / full upper    Wears glasses        Past Surgical History:   Procedure Laterality Date    COLONOSCOPY      HYSTERECTOMY      MYRINGOTOMY W/ TUBES Left     MN CYSTOURETHROSCOPY N/A 2/18/2016    Procedure: CYSTOSCOPY, BOTOX INJECTION;  Surgeon: Arvin Clark MD;  Location: [facility];  Service: Gynecology    TONSILLECTOMY      TUBAL LIGATION         No family history on file  Social History     Occupational History    Not on file       Social History Main Topics    Smoking status: Never Smoker    Smokeless tobacco: Not on file    Alcohol use No      Comment: 2 x year    Drug use: No    Sexual activity: Not on file       Current Outpatient Prescriptions on File Prior to Visit   Medication Sig    amLODIPine (NORVASC) 10 mg tablet Take 10 mg by mouth daily   aspirin 81 MG tablet Take 81 mg by mouth daily   Cholecalciferol (VITAMIN D3) 1000 UNITS CAPS Take 1 tablet by mouth daily   ibuprofen (MOTRIN) 800 mg tablet Take 800 mg by mouth every 6 (six) hours as needed for mild pain   LORazepam (ATIVAN) 2 mg tablet Take 2 mg by mouth 3 (three) times a day   omeprazole (PriLOSEC) 20 mg delayed release capsule Take 20 mg by mouth daily   primidone (MYSOLINE) 250 mg tablet Take 250 mg by mouth daily at bedtime   propranolol (INDERAL) 20 mg tablet Take 20 mg by mouth 2 (two) times a day   risperiDONE (RisperDAL) 2 mg tablet Take 2 mg by mouth daily at bedtime   traZODone (DESYREL) 150 mg tablet Take 150 mg by mouth daily at bedtime   venlafaxine (EFFEXOR) 25 mg tablet Take 25 mg by mouth 3 (three) times a day   [DISCONTINUED] baclofen 10 mg tablet Take 10 mg by mouth daily at bedtime   [DISCONTINUED] HYDROcodone-acetaminophen (NORCO) 5-325 mg per tablet Take 1 tablet by mouth every 6 (six) hours as needed for severe pain for up to 2 doses  Acute therapy Max Daily Amount: 4 tablets    [DISCONTINUED] traMADol (ULTRAM) 50 mg tablet Take 50 mg by mouth every 4 (four) hours as needed for moderate pain  No current facility-administered medications on file prior to visit  No Known Allergies    Physical Exam:    /85   Pulse 58   Temp 98 2 °F (36 8 °C)   Ht 5' 1" (1 549 m)   Wt 63 3 kg (139 lb 9 6 oz)   BMI 26 38 kg/m²     Constitutional: normal, well developed, well nourished, alert, in no distress and non-toxic and no overt pain behavior    Eyes: anicteric  HEENT: grossly intact  Neck: supple, symmetric, trachea midline and no masses   Pulmonary:even and unlabored  Cardiovascular:No edema or pitting edema present  Skin:Normal without rashes or lesions and well hydrated  Psychiatric:Mood and affect appropriate  Neurologic:Cranial Nerves II-XII grossly intact  Musculoskeletal:  Antalgic gait  Negative seated straight leg raise bilaterally  Bilateral lumbar paraspinals tender to palpation from L2-L5  Bilateral lower extremity strength 5/5 in all muscle groups      Imaging  Imaging reviewed

## 2018-02-15 ENCOUNTER — OFFICE VISIT (OUTPATIENT)
Dept: PSYCHIATRY | Facility: CLINIC | Age: 53
End: 2018-02-15
Payer: COMMERCIAL

## 2018-02-15 DIAGNOSIS — F41.1 GENERALIZED ANXIETY DISORDER: ICD-10-CM

## 2018-02-15 DIAGNOSIS — F41.0 PANIC DISORDER WITHOUT AGORAPHOBIA: ICD-10-CM

## 2018-02-15 DIAGNOSIS — F33.2 MDD (MAJOR DEPRESSIVE DISORDER), RECURRENT SEVERE, WITHOUT PSYCHOSIS (HCC): Primary | ICD-10-CM

## 2018-02-15 DIAGNOSIS — F43.10 POST TRAUMATIC STRESS DISORDER: ICD-10-CM

## 2018-02-15 DIAGNOSIS — F33.1 MAJOR DEPRESSIVE DISORDER, RECURRENT EPISODE, MODERATE DEGREE (HCC): ICD-10-CM

## 2018-02-15 PROBLEM — M46.1 SACROILIITIS (HCC): Status: ACTIVE | Noted: 2017-02-28

## 2018-02-15 PROBLEM — M25.552 BILATERAL HIP PAIN: Status: ACTIVE | Noted: 2017-06-19

## 2018-02-15 PROBLEM — M17.0 PRIMARY LOCALIZED OSTEOARTHRITIS OF BOTH KNEES: Status: ACTIVE | Noted: 2017-06-16

## 2018-02-15 PROBLEM — M25.551 BILATERAL HIP PAIN: Status: ACTIVE | Noted: 2017-06-19

## 2018-02-15 PROBLEM — F11.20 OPIOID DEPENDENCE (HCC): Status: ACTIVE | Noted: 2017-02-28

## 2018-02-15 PROCEDURE — 99213 OFFICE O/P EST LOW 20 MIN: CPT | Performed by: PSYCHIATRY & NEUROLOGY

## 2018-02-15 RX ORDER — TRAZODONE HYDROCHLORIDE 150 MG/1
150 TABLET ORAL
Qty: 30 TABLET | Refills: 0 | Status: SHIPPED | OUTPATIENT
Start: 2018-02-15 | End: 2018-02-15 | Stop reason: SDUPTHER

## 2018-02-15 RX ORDER — VENLAFAXINE 25 MG/1
25 TABLET ORAL 3 TIMES DAILY
Qty: 30 TABLET | Refills: 0 | Status: SHIPPED | OUTPATIENT
Start: 2018-02-15 | End: 2018-02-15 | Stop reason: SDUPTHER

## 2018-02-15 RX ORDER — VENLAFAXINE 25 MG/1
25 TABLET ORAL 3 TIMES DAILY
Qty: 270 TABLET | Refills: 0 | Status: SHIPPED | OUTPATIENT
Start: 2018-03-15 | End: 2018-02-15 | Stop reason: SDUPTHER

## 2018-02-15 RX ORDER — TRAZODONE HYDROCHLORIDE 150 MG/1
150 TABLET ORAL
Qty: 90 TABLET | Refills: 0 | Status: SHIPPED | OUTPATIENT
Start: 2018-03-15 | End: 2018-06-17 | Stop reason: SDUPTHER

## 2018-02-15 RX ORDER — LORAZEPAM 2 MG/1
2 TABLET ORAL 3 TIMES DAILY
Qty: 270 TABLET | Refills: 0 | Status: SHIPPED | OUTPATIENT
Start: 2018-03-15 | End: 2018-03-22 | Stop reason: SDUPTHER

## 2018-02-15 RX ORDER — RISPERIDONE 2 MG/1
2 TABLET, FILM COATED ORAL
Qty: 90 TABLET | Refills: 0 | Status: SHIPPED | OUTPATIENT
Start: 2018-03-15 | End: 2019-08-23 | Stop reason: ALTCHOICE

## 2018-02-15 RX ORDER — VENLAFAXINE 25 MG/1
25 TABLET ORAL 3 TIMES DAILY
Qty: 90 TABLET | Refills: 0 | Status: SHIPPED | OUTPATIENT
Start: 2018-02-15 | End: 2018-02-16 | Stop reason: SDUPTHER

## 2018-02-15 RX ORDER — RISPERIDONE 2 MG/1
2 TABLET, FILM COATED ORAL
Qty: 30 TABLET | Refills: 0 | Status: SHIPPED | OUTPATIENT
Start: 2018-02-15 | End: 2018-02-15 | Stop reason: SDUPTHER

## 2018-02-15 RX ORDER — LORAZEPAM 2 MG/1
2 TABLET ORAL 3 TIMES DAILY
Qty: 90 TABLET | Refills: 0 | Status: SHIPPED | OUTPATIENT
Start: 2018-02-15 | End: 2018-02-15 | Stop reason: SDUPTHER

## 2018-02-15 NOTE — PSYCH
Subjective: Medication Management     Patient ID: Connie Darnell is a 45 y o  female  HPI ROS Appetite Changes and Sleep: normal appetite, normal energy level, no weight change and normal number of sleep hours  Patient reported stable mood  She also informed me she will be moving to [place] with her daughter and grandchildren  Review Of Systems:     Mood Anxiety and Depression   Behavior Normal    Thought Content Disturbing Thoughts, Feelings and Unreasonalbe or Irrational Fears   General Relationship Problems, Emotional Problems, Sleep Disturbances and Decreased Functioning   Personality Normal   Other Psych Symptoms Normal   Constitutional Negative   ENT Negative   Cardiovascular Negative   Respiratory Negative   Gastrointestinal Negative   Genitourinary Negative   Musculoskeletal Negative   Integumentary Negative   Neurological Negative   Endocrine Normal    Other Symptoms Normal              Laboratory Results: No results found for this or any previous visit  Substance Abuse History:  History   Drug Use No       Family Psychiatric History: History reviewed  No pertinent family history  The following portions of the patient's history were reviewed and updated as appropriate: allergies, current medications, past family history, past medical history, past social history, past surgical history and problem list     Social History     Social History    Marital status: Single     Spouse name: N/A    Number of children: N/A    Years of education: N/A     Occupational History    Not on file       Social History Main Topics    Smoking status: Never Smoker    Smokeless tobacco: Not on file    Alcohol use No      Comment: 2 x year    Drug use: No    Sexual activity: Not on file     Other Topics Concern    Not on file     Social History Narrative    No narrative on file     Social History     Social History Narrative    No narrative on file       Objective:       Mental status:  Appearance calm and cooperative , adequate hygiene and grooming and good eye contact    Mood dysphoric, depressed and anxious   Affect affect was constricted   Speech a normal rate   Thought Processes coherent/organized and normal thought processes   Hallucinations no hallucinations present    Thought Content no delusions   Abnormal Thoughts no suicidal thoughts  and no homicidal thoughts    Orientation  oriented to person and place and time   Remote Memory short term memory impaired and long term memory impaired   Attention Span concentration impaired   Intellect Appears to be of Average Intelligence   Insight Limited insight   Judgement judgment was limited   Muscle Strength Muscle strength and tone were normal and Normal gait    Language no difficulty naming common objects, no difficulty repeating a phrase  and no difficulty writing a sentence    Fund of Knowledge displays adequate knowledge of current events, adequate fund of knowledge regarding past history and adequate fund of knowledge regarding vocabulary    Pain none   Pain Scale 0       Assessment/Plan:       Diagnoses and all orders for this visit:    MDD (major depressive disorder), recurrent severe, without psychosis ([facility] [address] )  -     Discontinue: risperiDONE (RisperDAL) 2 mg tablet; Take 1 tablet (2 mg total) by mouth daily at bedtime  -     Discontinue: venlafaxine (EFFEXOR) 25 mg tablet; Take 1 tablet (25 mg total) by mouth 3 (three) times a day  -     Discontinue: traZODone (DESYREL) 150 mg tablet; Take 1 tablet (150 mg total) by mouth daily at bedtime  -     Discontinue: LORazepam (ATIVAN) 2 mg tablet; Take 1 tablet (2 mg total) by mouth 3 (three) times a day  -     LORazepam (ATIVAN) 2 mg tablet; Take 1 tablet (2 mg total) by mouth 3 (three) times a day  -     risperiDONE (RisperDAL) 2 mg tablet; Take 1 tablet (2 mg total) by mouth daily at bedtime  -     traZODone (DESYREL) 150 mg tablet;  Take 1 tablet (150 mg total) by mouth daily at bedtime  -     Discontinue: venlafaxine (EFFEXOR) 25 mg tablet; Take 1 tablet (25 mg total) by mouth 3 (three) times a day  -     venlafaxine (EFFEXOR) 25 mg tablet; Take 1 tablet (25 mg total) by mouth 3 (three) times a day    Post traumatic stress disorder    Panic disorder without agoraphobia    Major depressive disorder, recurrent episode, moderate degree (HCC)    Generalized anxiety disorder            Treatment Recommendations- Risks Benefits      Immediate Medical/Psychiatric/Psychotherapy Treatments and Any Precautions: Continue current treatment, renew medications for 30 days to be filled in 2 weeks then provide print Rx for 90 days supplies for all her mediations to be filled next month  Risks, Benefits And Possible Side Effects Of Medications:  {PSYCH RISK, BENEFITS AND POSSIBLE SIDE EFFECTS (Optional):35212    Controlled Medication Discussion: Discussed with patient [place] warning on concurrent use of benzodiazepines and opioid medications including sedation, respiratory depression, coma and death  Patient understands the risk of treatment with benzodiazepines in addition to opioids and wants to continue taking those medications    and The patient has been filling controlled prescriptions on time as prescribed to [facility] program

## 2018-02-16 ENCOUNTER — TELEPHONE (OUTPATIENT)
Dept: PAIN MEDICINE | Facility: CLINIC | Age: 53
End: 2018-02-16

## 2018-02-16 ENCOUNTER — DOCUMENTATION (OUTPATIENT)
Dept: PSYCHIATRY | Facility: CLINIC | Age: 53
End: 2018-02-16

## 2018-02-16 RX ORDER — VENLAFAXINE 25 MG/1
25 TABLET ORAL 3 TIMES DAILY
Qty: 90 TABLET | Refills: 0 | Status: SHIPPED | OUTPATIENT
Start: 2018-02-16 | End: 2018-06-17 | Stop reason: SDUPTHER

## 2018-02-16 NOTE — TELEPHONE ENCOUNTER
Pt called and stated she is moving out of [facility] to [place] by the end March and wanted to know if she can get refills on her meds (morphine 15mg and Tylenol 3 with codeine, gabapentin 600mg and also take gabpaentin 300mg and meloxicam) before she moves  Had an injections approximately 3 weeks ago and her back is still bothering her  Having a hard time walking  Please call [phone]

## 2018-02-16 NOTE — PROGRESS NOTES
Prescription for lorazepam faxed to [facility] on [facility]  As requested by Dr Damien Clements

## 2018-02-16 NOTE — TELEPHONE ENCOUNTER
Can schedule the appointment with either me or DG before she moves near the end of March to ensure she has refills for at least a little more than a month until she can be established with another pain specialist

## 2018-02-26 NOTE — MISCELLANEOUS
Message   Recorded as Task   Date: 01/10/2018 12:35 PM, Created By: Terrence Ann   Task Name: Miscellaneous   Assigned To: [facility],Team   Regarding Patient: Joaquin Vega, Status: Active   Comment:    Perez,Carrie - 10 Nikolay 2018 12:35 PM     TASK CREATED  retrieved message from [place]    please call pt with MRI results again as she stated she was confused and would like someone to explain the results again [phone]  see task of 12/20/2017 for results  Dolly Montoya - 11 Jan 2018 10:12 AM     TASK EDITED  Called pt  and thoroughly reviewed MRI resluts with her so she can write them down for the ortho appt  Active Problems    1  Analgesic use (V58 69) (Z79 899)   2  Anxiety (300 00) (F41 9)   3  Arthralgia Of Shoulder Region (719 41)   4  Back pain (724 5) (M54 9)   5  Bereavement (V62 82) (Z63 4)   6  Bilateral hip pain (719 45) (M25 551,M25 552)   7  Bipolar II disorder (296 89) (F31 81)   8  Chronic bilateral low back pain with bilateral sciatica (724 2,724 3,338 29)   (M54 42,M54 41,G89 29)   9  Chronic pain of both knees (195 68,064 58) (M25 561,M25 562,G89 29)   10  Chronic pain syndrome (338 4) (G89 4)   11  Cognitive disorder (294 9) (F09)   12  Esophageal reflux (530 81) (K21 9)   13  Fatigue (780 79) (R53 83)   14  Female pelvic pain (625 9) (R10 2)   15  Fibromyalgia (729 1) (M79 7)   16  Generalized anxiety disorder (300 02) (F41 1)   17  Hypertension (401 9) (I10)   18  Hypokalemia (276 8) (E87 6)   19  Insomnia (780 52) (G47 00)   20  Lumbar radiculopathy (724 4) (M54 16)   21  Lumbar spondylosis (721 3) (M47 816)   22  Major depressive disorder, recurrent episode, moderate degree (296 32) (F33 1)   23  Migraine (346 90) (G43 909)   24  History of Need for prophylactic vaccination and inoculation against influenza (V04 81)    (Z23)   25  Opioid dependence (304 00) (F11 20)   26   Osteoarthritis of both hips, unspecified osteoarthritis type (715 95) (M16 0)   27  Osteoarthritis of knee (715 36) (M17 10)   28  Overactive bladder (596 51) (N32 81)   29  Pain, joint, hip (719 45) (M25 559)   30  Panic disorder without agoraphobia (300 01) (F41 0)   31  Post traumatic stress disorder (309 81) (F43 10)   32  Primary localized osteoarthritis of both knees (715 16) (M17 0)   33  Recent unexplained weight loss (783 21) (R63 4)   34  Right knee pain (719 46) (M25 561)   35  Sacroiliitis (720 2) (M46 1)   36  Tremor (781 0) (R25 1)   37  Urinary incontinence (788 30) (R32)   38  Vitamin D deficiency (268 9) (E55 9)    Current Meds   1  Acetaminophen-Codeine #3 300-30 MG Oral Tablet; TAKE 1 TABLET TWICE DAILY AS   NEEDED FOR PAIN;   Therapy: 47FQY4690 to (Evaluate:04Ndq2999); Last Rx:[id number] Ordered   2  AmLODIPine Besylate 10 MG Oral Tablet; TAKE 1 TABLET DAILY; Therapy: 47PPO3028 to (Evaluate:82Exs2713)  Requested for: 09LQQ9588; Last   Rx:66Gzb3876 Ordered   3  Aspirin 81 MG Oral Tablet Delayed Release; take 1 tablet by mouth daily; Therapy: 92VNY5254 to (Evaluate:27Ppk8075)  Requested for: 48IAB1251; Last   Rx:05Stz9462 Ordered   4  Gabapentin 300 MG Oral Capsule; Take one capsule at bedtime with one 600 mg tablet   to equal a total of 900mg at bedtime for leg pain; Therapy: 11OJC5919 to (Telly Lee)  Requested for: 42GMY7846; Last   Rx:55Sxz0319 Ordered   5  Gabapentin 600 MG Oral Tablet; Take 1 po hs; Therapy: 63KGP4880 to (Evaluate:08Mar2018)  Requested for: 34EVH2483; Last   Rx:78Ibr0513 Ordered   6  LORazepam 2 MG Oral Tablet; TAKE 1 TABLET THREE TIMES A DAY AS NEEDED *DO   NOT FILL UNTIL 8/21 PERMD FOR TRAVEL*;   Therapy: 87BTM6463 to (Evaluate:57Csd7795)  Requested for: 53QGE6255; Last   Rx:07Nov2017 Ordered   7  Meloxicam 7 5 MG Oral Tablet; Take 1 tablet twice daily as needed; Therapy: 81PCS9257 to (Last Rx:06Oct2017)  Requested for: 10QPB4784 Ordered   8   Morphine Sulfate ER 15 MG Oral Tablet Extended Release; take 1 po in am; Therapy: 56CRC6085 to (Evaluate:80Lbc6292); Last Rx:68Uug9365 Ordered   9  Omeprazole 20 MG Oral Capsule Delayed Release; TAKE 1 CAPSULE DAILY EVERY   MORNING BEFORE BREAKFAST; Therapy: 92IAA3020 to (Evaluate:07Evd8695)  Requested for: 10YBH0936; Last   Rx:12Tbo2611 Ordered   10  Primidone 250 MG Oral Tablet; TAKE 1 TABLET AT BEDTIME; Therapy: 08RMX0474 to (Rogerio [place])  Requested for: 27Iro7258; Last    Rx:54Cnq6518 Ordered   11  Propranolol HCl - 20 MG Oral Tablet; Take 1 tablet twice daily; Therapy: 51PDU9161 to (Evaluate:99Thk7549)  Requested for: 56ISW5222; Last    Rx:48Boy5044 Ordered   12  RisperiDONE 2 MG Oral Tablet (RisperDAL); TAKE 1 TABLET Bedtime; Therapy: 82PGH5783 to (Kirill Sit)  Requested for: 13DXX3393; Last    Rx:07Nov2017 Ordered   13  TiZANidine HCl - 2 MG Oral Tablet; Take 1 po tid prn spasms; Therapy: 25OGV0604 to (Chappell Pitch)  Requested for: 10RJY4500; Last    Rx:57Zqr2112 Ordered   14  TraZODone HCl - 150 MG Oral Tablet; TAKE 1 TABLET Once At Bedtime; Therapy: 50LAO9551 to (Kirill Sit)  Requested for: 07OPN3817; Last    Rx:07Nov2017 Ordered   15  Venlafaxine HCl - 25 MG Oral Tablet; TAKE 1 TABLET 3 TIMES DAILY WITH FOOD; Therapy: 41AOU3772 to (Kirill Sit)  Requested for: 86RZK6060; Last    Rx:07Nov2017 Ordered   16  VESIcare 10 MG Oral Tablet; Take 1 tablet daily; Therapy: 46ERR2610 to (Jacqualine Crystal)  Requested for: 27XWC3829; Last    Rx:04Zir4561 Ordered   17  Vitamin D3 1000 UNIT Oral Capsule; TAKE 1 CAPSULE DAILY; Therapy: 77BRG3033 to (Evaluate:77Ygt7287)  Requested for: 91WPT4308; Last    Rx:09Pam2850 Ordered    Allergies    1   No Known Drug Allergies    Signatures   Electronically signed by : Sweetie Dominguez, ; Jan 11 2018 10:13AM EST                       (Author)

## 2018-02-27 ENCOUNTER — TELEPHONE (OUTPATIENT)
Dept: PAIN MEDICINE | Facility: MEDICAL CENTER | Age: 53
End: 2018-02-27

## 2018-02-27 NOTE — TELEPHONE ENCOUNTER
Pt called asking if she can schedule another procedure  States that the first one did not work  She does have an ov scheduled with [facility] for 3/21, but asking if she can also schedule another injection before she moves  Pt can be reached at [phone]

## 2018-02-28 NOTE — TELEPHONE ENCOUNTER
S/w the patient and clarified and she stated she got about 30% relief with the previous injection and she continues with pain in her LB and has difficulty walking  Please advise   thanks

## 2018-02-28 NOTE — TELEPHONE ENCOUNTER
If the patient had gotten that much relief with previous injection then would be okay to schedule repeat injection

## 2018-03-08 NOTE — TELEPHONE ENCOUNTER
CALLED Pt, SCHEDULED REPEAT INJ ON 3/16/18 AT [facility] OFFICE W DR Dennis Dose  Went over pre procedure instructions, NPO 1 hr prior, if sick or on abx needs to call to rs, wear loose, comf clothing- no buttons/zippers, needs   Pt verbalized understanding

## 2018-03-13 ENCOUNTER — OFFICE VISIT (OUTPATIENT)
Dept: INTERNAL MEDICINE CLINIC | Facility: CLINIC | Age: 53
End: 2018-03-13
Payer: COMMERCIAL

## 2018-03-13 VITALS
TEMPERATURE: 97.6 F | HEART RATE: 72 BPM | BODY MASS INDEX: 26.06 KG/M2 | SYSTOLIC BLOOD PRESSURE: 104 MMHG | HEIGHT: 61 IN | DIASTOLIC BLOOD PRESSURE: 76 MMHG | WEIGHT: 138.01 LBS

## 2018-03-13 DIAGNOSIS — I10 HTN (HYPERTENSION): ICD-10-CM

## 2018-03-13 DIAGNOSIS — N32.81 OVERACTIVE BLADDER: ICD-10-CM

## 2018-03-13 DIAGNOSIS — E55.9 VITAMIN D DEFICIENCY: ICD-10-CM

## 2018-03-13 DIAGNOSIS — F41.9 ANXIETY: ICD-10-CM

## 2018-03-13 DIAGNOSIS — I10 HYPERTENSION: ICD-10-CM

## 2018-03-13 DIAGNOSIS — R25.1 TREMOR: ICD-10-CM

## 2018-03-13 DIAGNOSIS — Z23 NEED FOR PROPHYLACTIC VACCINATION AND INOCULATION AGAINST INFLUENZA: Primary | ICD-10-CM

## 2018-03-13 DIAGNOSIS — Z12.39 SCREENING FOR BREAST CANCER: ICD-10-CM

## 2018-03-13 DIAGNOSIS — K21.9 GERD (GASTROESOPHAGEAL REFLUX DISEASE): ICD-10-CM

## 2018-03-13 PROCEDURE — 99213 OFFICE O/P EST LOW 20 MIN: CPT | Performed by: INTERNAL MEDICINE

## 2018-03-13 RX ORDER — BIOTIN 1 MG
1000 TABLET ORAL DAILY
Qty: 90 CAPSULE | Refills: 1 | Status: SHIPPED | OUTPATIENT
Start: 2018-03-13 | End: 2019-08-29 | Stop reason: SDUPTHER

## 2018-03-13 RX ORDER — PRIMIDONE 250 MG/1
250 TABLET ORAL
Qty: 90 TABLET | Refills: 1 | Status: SHIPPED | OUTPATIENT
Start: 2018-03-13 | End: 2019-08-29 | Stop reason: SDUPTHER

## 2018-03-13 RX ORDER — SOLIFENACIN SUCCINATE 10 MG/1
10 TABLET, FILM COATED ORAL DAILY
Qty: 90 TABLET | Refills: 0 | Status: SHIPPED | OUTPATIENT
Start: 2018-03-13 | End: 2019-08-23 | Stop reason: ALTCHOICE

## 2018-03-13 RX ORDER — OMEPRAZOLE 20 MG/1
20 CAPSULE, DELAYED RELEASE ORAL DAILY
Qty: 90 CAPSULE | Refills: 0 | Status: SHIPPED | OUTPATIENT
Start: 2018-03-13 | End: 2019-08-23 | Stop reason: ALTCHOICE

## 2018-03-13 RX ORDER — AMLODIPINE BESYLATE 10 MG/1
10 TABLET ORAL DAILY
Qty: 90 TABLET | Refills: 1 | Status: SHIPPED | OUTPATIENT
Start: 2018-03-13 | End: 2019-08-16 | Stop reason: SDUPTHER

## 2018-03-13 RX ORDER — PROPRANOLOL HYDROCHLORIDE 20 MG/1
20 TABLET ORAL 2 TIMES DAILY
Qty: 120 TABLET | Refills: 0 | Status: SHIPPED | OUTPATIENT
Start: 2018-03-13 | End: 2018-05-22 | Stop reason: SDUPTHER

## 2018-03-13 NOTE — PROGRESS NOTES
INTERNAL MEDICINE FOLLOW-UP OFFICE VISIT  [facility]  [address] [place] [address]    NAME: Haider Cates  AGE: 45 y o  SEX: female    DATE OF ENCOUNTER: 3/13/2018    Assessment and Plan   Refills for patients medications were supplied  She will address need for mammogram when she establishes care in [place]  Problem List Items Addressed This Visit        Cardiovascular and Mediastinum    Hypertension    Relevant Medications    amLODIPine (NORVASC) 10 mg tablet    aspirin 81 MG tablet    propranolol (INDERAL) 20 mg tablet       Genitourinary    Overactive bladder    Relevant Medications    solifenacin (VESICARE) 10 MG tablet       Other    Anxiety    Tremor    Relevant Medications    primidone (MYSOLINE) 250 mg tablet    propranolol (INDERAL) 20 mg tablet    Vitamin D deficiency    Relevant Medications    Cholecalciferol (VITAMIN D3) 1000 units CAPS      Other Visit Diagnoses     Need for prophylactic vaccination and inoculation against influenza    -  Primary    Screening for breast cancer        HTN (hypertension)        Relevant Medications    amLODIPine (NORVASC) 10 mg tablet    propranolol (INDERAL) 20 mg tablet    GERD (gastroesophageal reflux disease)        Relevant Medications    omeprazole (PriLOSEC) 20 mg delayed release capsule          No orders of the defined types were placed in this encounter       - Counseling Documentation: patient was counseled regarding: instructions for management, impressions and risks and benefits of treatment options  - Medication Side Effects: Adverse side effects of medications were reviewed with the patient/guardian today  Chief Complaint     Chief Complaint   Patient presents with    Follow-up     would like medications refilled before she moves       History of Present Illness     Here for follow up and to get refills on some of her medications    She is moving to [place] at the end of the month and needs a 90 day supply for her medications that we prescribe  She has no complaints today other than her chronic pain  She follows with pain management  The following portions of the patient's history were reviewed and updated as appropriate: allergies, current medications, past family history, past medical history, past social history, past surgical history and problem list     Review of Systems     Review of Systems   Constitutional: Negative for chills and fever  HENT: Negative for congestion and rhinorrhea  Respiratory: Negative for cough and shortness of breath  Cardiovascular: Negative for chest pain and palpitations  Gastrointestinal: Negative for constipation and vomiting  Genitourinary: Negative for dysuria and hematuria  Musculoskeletal: Positive for arthralgias and back pain  Skin: Negative for rash  Psychiatric/Behavioral: Negative for behavioral problems         Active Problem List     Patient Active Problem List   Diagnosis    Chronic bilateral low back pain with bilateral sciatica    Right knee pain    Chronic pain disorder    Lumbar radiculopathy    Lumbar spondylosis    Fibromyalgia    Lumbar disc disease with radiculopathy    Spinal stenosis of lumbar region with neurogenic claudication    Anxiety    Pain in joint, shoulder region    Back pain    Bilateral hip pain    Bipolar II disorder ([address] )    Cognitive disorder    Esophageal reflux    Fatigue    Female pelvic pain    Generalized anxiety disorder    Hypertension    Hypokalemia    Insomnia    Major depressive disorder, recurrent episode, moderate degree (HCC)    Migraine    Opioid dependence (HCC)    Osteoarthritis of both hips    Osteoarthritis of knee    Overactive bladder    Pain, joint, hip    Panic disorder without agoraphobia    Post traumatic stress disorder    Primary localized osteoarthritis of both knees    Recent unexplained weight loss    Sacroiliitis (HCC)    Tremor    Urinary incontinence    Vitamin D deficiency       Objective     /76   Pulse 72   Temp 97 6 °F (36 4 °C)   Ht 5' 1" (1 549 m)   Wt 62 6 kg (138 lb 0 1 oz)   BMI 26 08 kg/m²     Physical Exam   Constitutional: She is oriented to person, place, and time  She appears well-developed and well-nourished  No distress  HENT:   Head: Normocephalic and atraumatic  Eyes: EOM are normal    Neck: No tracheal deviation present  Cardiovascular: Normal rate, regular rhythm and normal heart sounds  No murmur heard  Pulmonary/Chest: Effort normal and breath sounds normal  No stridor  She has no wheezes  She has no rales  Abdominal: Soft  There is no tenderness  Musculoskeletal: She exhibits no edema  Neurological: She is alert and oriented to person, place, and time  Skin: Skin is warm and dry  No rash noted  Psychiatric: She has a normal mood and affect   Her behavior is normal        Pertinent Laboratory/Diagnostic Studies:  CBC:   Lab Results   Component Value Date/Time    WBC 4 50 10/26/2015 11:23 AM    RBC 4 80 10/26/2015 11:23 AM    HGB 14 9 10/26/2015 11:23 AM    HCT 39 7 02/16/2016 12:16 PM    HCT 41 9 10/26/2015 11:23 AM    MCV 87 10/26/2015 11:23 AM    MCH 31 0 10/26/2015 11:23 AM    MCHC 35 6 10/26/2015 11:23 AM    RDW 12 9 10/26/2015 11:23 AM    MPV 9 5 10/26/2015 11:23 AM     10/26/2015 11:23 AM    NRBC 0 10/26/2015 11:23 AM    NEUTOPHILPCT 54 10/26/2015 11:23 AM    LYMPHOPCT 36 10/26/2015 11:23 AM    MONOPCT 8 10/26/2015 11:23 AM    EOSPCT 1 10/26/2015 11:23 AM    BASOPCT 1 10/26/2015 11:23 AM    NEUTROABS 2 43 10/26/2015 11:23 AM    LYMPHSABS 1 62 10/26/2015 11:23 AM    MONOSABS 0 36 10/26/2015 11:23 AM    EOSABS 0 05 10/26/2015 11:23 AM     Chemistry Profile:   Lab Results   Component Value Date/Time     07/18/2017 01:47 PM     07/27/2015 12:06 PM    K 3 2 (L) 07/18/2017 01:47 PM    K 3 6 07/27/2015 12:06 PM     07/18/2017 01:47 PM    CL 99 (L) 07/27/2015 12:06 PM    CO2 31 07/18/2017 01:47 PM    CO2 31 07/27/2015 12:06 PM    ANIONGAP 7 07/18/2017 01:47 PM    ANIONGAP 7 07/27/2015 12:06 PM    BUN 6 07/18/2017 01:47 PM    BUN 6 07/27/2015 12:06 PM    CREATININE 0 88 07/18/2017 01:47 PM    CREATININE 0 75 07/27/2015 12:06 PM    GLUCOSE 94 07/18/2017 01:47 PM    GLUCOSE 96 07/27/2015 12:06 PM    CALCIUM 8 5 07/18/2017 01:47 PM    CALCIUM 9 1 07/27/2015 12:06 PM    AST 13 07/18/2017 01:47 PM    AST 9 02/22/2014 06:15 AM    ALT 19 07/18/2017 01:47 PM    ALT 18 02/22/2014 06:15 AM    ALKPHOS 86 07/18/2017 01:47 PM    ALKPHOS 96 02/22/2014 06:15 AM    PROT 7 0 07/18/2017 01:47 PM    PROT 6 9 02/22/2014 06:15 AM    BILITOT 0 57 07/18/2017 01:47 PM    BILITOT 0 51 02/22/2014 06:15 AM    EGFR >60 0 07/18/2017 01:47 PM       Current Medications     Current Outpatient Prescriptions:     acetaminophen-codeine (TYLENOL #3) 300-30 mg per tablet, Take 1 tablet by mouth 2 (two) times a day as needed for moderate pain, Disp: 60 tablet, Rfl: 1    amLODIPine (NORVASC) 10 mg tablet, Take 1 tablet (10 mg total) by mouth daily, Disp: 90 tablet, Rfl: 1    aspirin 81 MG tablet, Take 1 tablet (81 mg total) by mouth daily, Disp: 90 tablet, Rfl: 1    Cholecalciferol (VITAMIN D3) 1000 units CAPS, Take 1 capsule (1,000 Units total) by mouth daily, Disp: 90 capsule, Rfl: 1    gabapentin (NEURONTIN) 300 mg capsule, Take 1 capsule (300 mg total) by mouth daily at bedtime To be added to 600mg capsule for a total of 900mg qhs, Disp: 30 capsule, Rfl: 2    gabapentin (NEURONTIN) 600 MG tablet, Take 1 tablet (600 mg total) by mouth daily at bedtime, Disp: 30 tablet, Rfl: 2    [START ON 3/15/2018] LORazepam (ATIVAN) 2 mg tablet, Take 1 tablet (2 mg total) by mouth 3 (three) times a day, Disp: 270 tablet, Rfl: 0    meloxicam (MOBIC) 7 5 mg tablet, Take 1 tablet (7 5 mg total) by mouth 2 (two) times a day as needed for mild pain, Disp: 60 tablet, Rfl: 2    [START ON 3/16/2018] morphine (MS CONTIN) 15 mg 12 hr tablet, Take 1 tablet (15 mg total) by mouth daily Earliest Fill Date: 3/16/18 Max Daily Amount: 15 mg, Disp: 30 tablet, Rfl: 0    omeprazole (PriLOSEC) 20 mg delayed release capsule, Take 1 capsule (20 mg total) by mouth daily, Disp: 90 capsule, Rfl: 0    primidone (MYSOLINE) 250 mg tablet, Take 1 tablet (250 mg total) by mouth daily at bedtime, Disp: 90 tablet, Rfl: 1    propranolol (INDERAL) 20 mg tablet, Take 1 tablet (20 mg total) by mouth 2 (two) times a day, Disp: 120 tablet, Rfl: 0    [START ON 3/15/2018] risperiDONE (RisperDAL) 2 mg tablet, Take 1 tablet (2 mg total) by mouth daily at bedtime, Disp: 90 tablet, Rfl: 0    solifenacin (VESICARE) 10 MG tablet, Take 1 tablet (10 mg total) by mouth daily, Disp: 90 tablet, Rfl: 0    tiZANidine (ZANAFLEX) 2 mg tablet, Take 1 tablet (2 mg total) by mouth every 8 (eight) hours as needed for muscle spasms, Disp: 90 tablet, Rfl: 2    [START ON 3/15/2018] traZODone (DESYREL) 150 mg tablet, Take 1 tablet (150 mg total) by mouth daily at bedtime, Disp: 90 tablet, Rfl: 0    venlafaxine (EFFEXOR) 25 mg tablet, Take 1 tablet (25 mg total) by mouth 3 (three) times a day, Disp: 90 tablet, Rfl: 0    Health Maintenance     Health Maintenance   Topic Date Due    HIV SCREENING  1965    COLONOSCOPY  1965    Depression Screening PHQ-9  12/26/1975    INFLUENZA VACCINE  09/01/2017    DTaP,Tdap,and Td Vaccines (2 - Td) 07/25/2024    Hepatitis C Screening  Completed     Immunization History   Administered Date(s) Administered    Influenza TIV (IM) 10/02/2013, 11/20/2014, 10/26/2015    Tdap 07/25/2014       Bhargavi RIVAS    Internal Medicine PGY-3  3/13/2018 10:39 AM

## 2018-03-16 ENCOUNTER — HOSPITAL ENCOUNTER (OUTPATIENT)
Dept: RADIOLOGY | Facility: CLINIC | Age: 53
Discharge: HOME | End: 2018-03-16
Admitting: ANESTHESIOLOGY
Payer: COMMERCIAL

## 2018-03-16 VITALS
TEMPERATURE: 98.3 F | OXYGEN SATURATION: 100 % | DIASTOLIC BLOOD PRESSURE: 69 MMHG | RESPIRATION RATE: 18 BRPM | SYSTOLIC BLOOD PRESSURE: 105 MMHG | HEART RATE: 73 BPM

## 2018-03-16 DIAGNOSIS — M54.16 LUMBAR RADICULOPATHY: ICD-10-CM

## 2018-03-16 PROCEDURE — 64483 NJX AA&/STRD TFRM EPI L/S 1: CPT | Performed by: ANESTHESIOLOGY

## 2018-03-16 RX ORDER — PAPAVERINE HCL 150 MG
15 CAPSULE, EXTENDED RELEASE ORAL ONCE
Status: COMPLETED | OUTPATIENT
Start: 2018-03-16 | End: 2018-03-16

## 2018-03-16 RX ORDER — LIDOCAINE HYDROCHLORIDE 10 MG/ML
5 INJECTION, SOLUTION EPIDURAL; INFILTRATION; INTRACAUDAL; PERINEURAL ONCE
Status: COMPLETED | OUTPATIENT
Start: 2018-03-16 | End: 2018-03-16

## 2018-03-16 RX ADMIN — DEXAMETHASONE SODIUM PHOSPHATE 15 MG: 10 INJECTION, SOLUTION INTRAMUSCULAR; INTRAVENOUS at 10:27

## 2018-03-16 RX ADMIN — LIDOCAINE HYDROCHLORIDE 4 ML: 10 INJECTION, SOLUTION EPIDURAL; INFILTRATION; INTRACAUDAL; PERINEURAL at 10:21

## 2018-03-16 RX ADMIN — IOHEXOL 1 ML: 300 INJECTION, SOLUTION INTRAVENOUS at 10:26

## 2018-03-16 RX ADMIN — LIDOCAINE HYDROCHLORIDE 2 ML: 20 INJECTION, SOLUTION EPIDURAL; INFILTRATION; INTRACAUDAL; PERINEURAL at 10:27

## 2018-03-16 NOTE — DISCHARGE INSTRUCTIONS
Epidural Steroid Injection   WHAT YOU NEED TO KNOW:   An epidural steroid injection (GABY) is a procedure to inject steroid medicine into the epidural space  The epidural space is between your spinal cord and vertebrae  Steroids reduce inflammation and fluid buildup in your spine that may be causing pain  You may be given pain medicine along with the steroids  ACTIVITY  · Do not drive or operate machinery today  · No strenuous activity today - bending, lifting, etc   · You may resume normal activites starting tomorrow - start slowly and as tolerated  · You may shower today, but no tub baths or hot tubs  · You may have numbness for several hours from the local anesthetic  Please use caution and common sense, especially with weight-bearing activities  CARE OF THE INJECTION SITE  · If you have soreness or pain, apply ice to the area today (20 minutes on/20 minutes off)  · Starting tomorrow, you may use warm, moist heat or ice if needed  · You may have an increase or change in your discomfort for 36-48 hours after your treatment  · Apply ice and continue with any pain medication you have been prescribed  · Notify [facility] if you have any of the following: redness, drainage, swelling, headache, stiff neck or fever above 100°F     SPECIAL INSTRUCTIONS  · Our office will contact you in approximately 7 days for a progress report  MEDICATIONS  · Continue to take all routine medications  · Our office may have instructed you to hold some medications  If you have a problem specifically related to your procedure, please call our office at [phone]  Problems not related to your procedure should be directed to your primary care physician

## 2018-03-16 NOTE — H&P
History of Present Illness: The patient is a 45 y o  female who presents with complaints of low back and leg pain  Patient Active Problem List   Diagnosis    Chronic bilateral low back pain with bilateral sciatica    Right knee pain    Chronic pain disorder    Lumbar radiculopathy    Lumbar spondylosis    Fibromyalgia    Lumbar disc disease with radiculopathy    Spinal stenosis of lumbar region with neurogenic claudication    Anxiety    Pain in joint, shoulder region    Back pain    Bilateral hip pain    Bipolar II disorder ([address] )    Cognitive disorder    Esophageal reflux    Fatigue    Female pelvic pain    Generalized anxiety disorder    Hypertension    Hypokalemia    Insomnia    Major depressive disorder, recurrent episode, moderate degree (HCC)    Migraine    Opioid dependence (HCC)    Osteoarthritis of both hips    Osteoarthritis of knee    Overactive bladder    Pain, joint, hip    Panic disorder without agoraphobia    Post traumatic stress disorder    Primary localized osteoarthritis of both knees    Recent unexplained weight loss    Sacroiliitis (HCC)    Tremor    Urinary incontinence    Vitamin D deficiency       Past Medical History:   Diagnosis Date    Acid reflux     Anxiety     RESOLVED: [id number]    Arthritis     Bipolar 2 disorder (HCC)     FOLLOWS WITH PSYCHIATRIST  CONTINUE LAMOTRIGINE; RESOLVED: [id number]    Depression     Familial tremor     both hands    Fibromyalgia     LAST ASSESSED: [id number]    Hearing aid worn     left ear    [place] (hard of hearing)     left ear    Hypertension     Left-sided weakness     Lower back pain     Memory loss of unknown cause     long and short term    Migraine     Overactive bladder     Panic attack     Post traumatic stress disorder     Seasonal allergies     Stroke [facility])     questionable stroke 2009    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    CONT ASA 81 MG DAILY; RESOLVED: [id number]  Urinary incontinence     Wears dentures     partial lower / full upper    Wears glasses        Past Surgical History:   Procedure Laterality Date     SECTION      COLONOSCOPY      RESOLVED: [id number]    EAR SURGERY      HYSTERECTOMY  2004    MYRINGOTOMY W/ TUBES Left     ND CYSTOURETHROSCOPY N/A 2016    Procedure: CYSTOSCOPY, BOTOX INJECTION;  Surgeon: Andrea Curry MD;  Location: [facility];  Service: Gynecology    TONSILLECTOMY      TUBAL LIGATION           Current Outpatient Prescriptions:     acetaminophen-codeine (TYLENOL #3) 300-30 mg per tablet, Take 1 tablet by mouth 2 (two) times a day as needed for moderate pain, Disp: 60 tablet, Rfl: 1    amLODIPine (NORVASC) 10 mg tablet, Take 1 tablet (10 mg total) by mouth daily, Disp: 90 tablet, Rfl: 1    aspirin 81 MG tablet, Take 1 tablet (81 mg total) by mouth daily, Disp: 90 tablet, Rfl: 1    Cholecalciferol (VITAMIN D3) 1000 units CAPS, Take 1 capsule (1,000 Units total) by mouth daily, Disp: 90 capsule, Rfl: 1    gabapentin (NEURONTIN) 300 mg capsule, Take 1 capsule (300 mg total) by mouth daily at bedtime To be added to 600mg capsule for a total of 900mg qhs, Disp: 30 capsule, Rfl: 2    gabapentin (NEURONTIN) 600 MG tablet, Take 1 tablet (600 mg total) by mouth daily at bedtime, Disp: 30 tablet, Rfl: 2    LORazepam (ATIVAN) 2 mg tablet, Take 1 tablet (2 mg total) by mouth 3 (three) times a day, Disp: 270 tablet, Rfl: 0    meloxicam (MOBIC) 7 5 mg tablet, Take 1 tablet (7 5 mg total) by mouth 2 (two) times a day as needed for mild pain, Disp: 60 tablet, Rfl: 2    morphine (MS CONTIN) 15 mg 12 hr tablet, Take 1 tablet (15 mg total) by mouth daily Earliest Fill Date: 3/16/18 Max Daily Amount: 15 mg, Disp: 30 tablet, Rfl: 0    omeprazole (PriLOSEC) 20 mg delayed release capsule, Take 1 capsule (20 mg total) by mouth daily, Disp: 90 capsule, Rfl: 0    primidone (MYSOLINE) 250 mg tablet, Take 1 tablet (250 mg total) by mouth daily at bedtime, Disp: 90 tablet, Rfl: 1    propranolol (INDERAL) 20 mg tablet, Take 1 tablet (20 mg total) by mouth 2 (two) times a day, Disp: 120 tablet, Rfl: 0    risperiDONE (RisperDAL) 2 mg tablet, Take 1 tablet (2 mg total) by mouth daily at bedtime, Disp: 90 tablet, Rfl: 0    solifenacin (VESICARE) 10 MG tablet, Take 1 tablet (10 mg total) by mouth daily, Disp: 90 tablet, Rfl: 0    tiZANidine (ZANAFLEX) 2 mg tablet, Take 1 tablet (2 mg total) by mouth every 8 (eight) hours as needed for muscle spasms, Disp: 90 tablet, Rfl: 2    traZODone (DESYREL) 150 mg tablet, Take 1 tablet (150 mg total) by mouth daily at bedtime, Disp: 90 tablet, Rfl: 0    venlafaxine (EFFEXOR) 25 mg tablet, Take 1 tablet (25 mg total) by mouth 3 (three) times a day, Disp: 90 tablet, Rfl: 0    Current Facility-Administered Medications:     dexamethasone (PF) (DECADRON) injection 15 mg, 15 mg, Epidural, Once, Vikash Kendall, DO    iohexol (OMNIPAQUE) 300 mg/mL injection 50 mL, 50 mL, Epidural, Once, Vikash Kendall, DO    lidocaine (PF) (XYLOCAINE-MPF) 1 % injection 5 mL, 5 mL, Infiltration, Once, Vikash Kendall, DO    lidocaine (PF) (XYLOCAINE-MPF) 2 % injection 2 mL, 2 mL, Epidural, Once, Lakshmi Dubois, DO    No Known Allergies    Physical Exam:   Vitals:    03/16/18 0955   BP: 90/60   Pulse: 73   Resp: 20   Temp: 98 3 °F (36 8 °C)   SpO2: 97%     General: Awake, Alert, Oriented x 3, Mood and affect appropriate  Respiratory: Respirations even and unlabored  Cardiovascular: Peripheral pulses intact; no edema  Musculoskeletal Exam:  Bilateral lumbar paraspinals tender To palpation  ASA Score: 3    Assessment:   1  Lumbar radiculopathy        Plan: REPEAT B/L L4 TFESI       Assessment:  1  Lumbar radiculopathy    2  Lumbar spondylosis    3  Chronic bilateral low back pain with bilateral sciatica    4  Chronic pain disorder    5  Fibromyalgia    6  Lumbar disc disease with radiculopathy    7   Spinal stenosis of lumbar region with neurogenic claudication          Plan:   57-year-old female with a history of fibromyalgia returning for follow-up of lumbosacral back pain with radiculopathy in the L4-5 distributions of bilateral lower extremities  The patient does have multilevel degenerative disc disease and spondylosis with varying degrees of central and foraminal stenosis and a disc herniation at L4-5  The patient is status post bilateral L4 TFESI January 26, 2018 and has noted some improvement in her low back and lower extremity pain  I did discuss with the patient that she needs to give it a little more time to see if she gets a delayed effect of the steroid as she only had the injection about a week ago  The patient verbalized understanding  She currently manages her pain with gabapentin 900 milligrams q h s , meloxicam 7 5 milligrams b i d  p r n , tizanidine 2 milligrams q 8 hours p r n , morphine ER 15 milligrams daily, and Tylenol No   3 b i d  p r n  for breakthrough pain  This regimen gives her approximately 30 percent relief  1   I will refill Morphine ER 15 milligrams daily x2 months and the patient was given a prescription for do not filled before February 14, 2018 and another for do not fill before March 16, 2018   2   I will continue Tylenol No   3 b i d  p r n  for breakthrough pain x2 more months and she was given prescriptions for do not fill before February 18, 2018 and another for do not fill before March 16, 2018  I advised the patient try minimize the use his medication as much as possible  3  I refilled meloxicam 7 5 milligrams b i d  p r n   4   The patient will continue with gabapentin 900 milligrams q h s  as she was unable tolerate daytime doses secondary to drowsiness for her neuropathic complaints  5  The patient will continue with tizanidine 2 milligrams q 8 hours p r n  for her myofascial pain  6  The patient will continue with her home exercise program  7    I will follow up the patient in 2 months     There are risks associated with opioid medications, including dependence, addiction and tolerance  The patient understands and agrees to use these medications only as prescribed  Potential side effects of the medications include, but are not limited to, constipation, drowsiness, addiction, impaired judgment and risk of fatal overdose if not taken as prescribed  The patient was warned against driving while taking sedation medications  Sharing medications is a felony  At this point in time, the patient is showing no signs of addiction, abuse, diversion or suicidal ideation      A urine drug screen was collected at today's office visit as part of our medication management protocol  The point of care testing results were appropriate for what was being prescribed  The specimen will be sent for confirmatory testing  The drug screen is medically necessary because the patient is either dependent on opioid medication or is being considered for opioid medication therapy and the results could impact ongoing or future treatment  The drug screen is to evaluate for the presences or absence of prescribed, non-prescribed, and/or illicit drugs/substances      [place] Prescription Drug Monitoring Program report was reviewed and was appropriate         My impressions and treatment recommendations were discussed in detail with the patient who verbalized understanding and had no further questions  Discharge instructions were provided   I personally saw and examined the patient and I agree with the above discussed plan of care      No orders of the defined types were placed in this encounter           New Medications Ordered This Visit   Medications    acetaminophen-codeine (TYLENOL #3) 300-30 mg per tablet       Sig: Take 1 tablet by mouth 2 (two) times a day as needed    gabapentin (NEURONTIN) 600 MG tablet       Sig: Take by mouth    meloxicam (MOBIC) 7 5 mg tablet       Sig: Take 1 tablet by mouth 2 (two) times a day as needed    morphine (MS CONTIN) 15 mg 12 hr tablet       Sig: Take by mouth    solifenacin (VESICARE) 10 MG tablet       Sig: Take 1 tablet by mouth daily    tiZANidine (ZANAFLEX) 2 mg tablet       Sig: Take by mouth    gabapentin (NEURONTIN) 300 mg capsule       Sig: Take by mouth         History of Present Illness:     47-year-old female with a history of fibromyalgia returning for follow-up of lumbosacral back pain with radiculopathy in the L4-5 distributions of bilateral lower extremities  The patient does have multilevel degenerative disc disease and spondylosis with varying degrees of central and foraminal stenosis and a disc herniation at L4-5  The patient is status post bilateral L4 TFESI January 26, 2018 and has noted some improvement in her low back and lower extremity pain  She denies any bladder or bowel incontinence or saddle anesthesia  She currently manages her pain with gabapentin 900 milligrams q h s , meloxicam 7 5 milligrams b i d  p r n , tizanidine 2 milligrams q 8 hours p r n , morphine ER 15 milligrams daily, and Tylenol No   3 b i d  p r n  for breakthrough pain  This regimen gives her approximately 30 percent relief in she denies any side effects from the medications  The patient rates her pain a 9/10 on the pain is worse in the morning and at night  The pain is constant and described as burning, dull, aching, throbbing, cramping, shooting, and numbness  The pain is worse with exercise, standing, walking, lifting, and bending and twisting at the waist   The pain is alleviated with lying down      I have personally reviewed and/or updated the patient's past medical history, past surgical history, family history, social history, allergies, and vital signs today                                                      Other than as stated above, the patient denies any interval changes in medications, medical condition, mental condition, symptoms, or allergies since the last office visit                                                        Review of Systems:     Review of Systems   Respiratory: Negative for shortness of breath  Cardiovascular: Negative for chest pain  Gastrointestinal: Negative for constipation, diarrhea, nausea and vomiting  Musculoskeletal: Negative for arthralgias, gait problem, joint swelling and myalgias  Difficulty walking, Decreased ROM, Joint stiffness    Skin: Negative for rash  Neurological: Negative for dizziness, seizures and weakness (Muscle )     All other systems reviewed and are negative         There is no problem list on file for this patient         Medical History        Past Medical History:   Diagnosis Date    Acid reflux      Anxiety      Arthritis      Bipolar 2 disorder (HCC)      Depression      Familial tremor       both hands    Fibromyalgia      Hearing aid worn       left ear    [place] (hard of hearing)       left ear    Hypertension      Left-sided weakness      Lower back pain      Memory loss of unknown cause       long and short term    Migraine      Overactive bladder      Panic attack      Post traumatic stress disorder      Seasonal allergies      Stroke [facility])       questionable stroke 2009    Urinary incontinence      Wears dentures       partial lower / full upper    Wears glasses              Surgical History         Past Surgical History:   Procedure Laterality Date    COLONOSCOPY        HYSTERECTOMY        MYRINGOTOMY W/ TUBES Left      [place] CYSTOURETHROSCOPY N/A 2/18/2016     Procedure: CYSTOSCOPY, BOTOX INJECTION;  Surgeon: Saud Clark MD;  Location[facility] OR;  Service: Gynecology    TONSILLECTOMY        TUBAL LIGATION                No family history on file      Social History          Occupational History    Not on file              Social History Main Topics    Smoking status: Never Smoker    Smokeless tobacco: Not on file    Alcohol use No         Comment: 2 x year    Drug use: No    Sexual activity: Not on file              Current Outpatient Prescriptions on File Prior to Visit   Medication Sig    amLODIPine (NORVASC) 10 mg tablet Take 10 mg by mouth daily   aspirin 81 MG tablet Take 81 mg by mouth daily   Cholecalciferol (VITAMIN D3) 1000 UNITS CAPS Take 1 tablet by mouth daily   ibuprofen (MOTRIN) 800 mg tablet Take 800 mg by mouth every 6 (six) hours as needed for mild pain   LORazepam (ATIVAN) 2 mg tablet Take 2 mg by mouth 3 (three) times a day   omeprazole (PriLOSEC) 20 mg delayed release capsule Take 20 mg by mouth daily   primidone (MYSOLINE) 250 mg tablet Take 250 mg by mouth daily at bedtime   propranolol (INDERAL) 20 mg tablet Take 20 mg by mouth 2 (two) times a day   risperiDONE (RisperDAL) 2 mg tablet Take 2 mg by mouth daily at bedtime   traZODone (DESYREL) 150 mg tablet Take 150 mg by mouth daily at bedtime   venlafaxine (EFFEXOR) 25 mg tablet Take 25 mg by mouth 3 (three) times a day   [DISCONTINUED] baclofen 10 mg tablet Take 10 mg by mouth daily at bedtime   [DISCONTINUED] HYDROcodone-acetaminophen (NORCO) 5-325 mg per tablet Take 1 tablet by mouth every 6 (six) hours as needed for severe pain for up to 2 doses  Acute therapy Max Daily Amount: 4 tablets    [DISCONTINUED] traMADol (ULTRAM) 50 mg tablet Take 50 mg by mouth every 4 (four) hours as needed for moderate pain       No current facility-administered medications on file prior to visit           No Known Allergies     Physical Exam:     /85   Pulse 58   Temp 98 2 °F (36 8 °C)   Ht 5' 1" (1 549 m)   Wt 63 3 kg (139 lb 9 6 oz)   BMI 26 38 kg/m²      Constitutional: normal, well developed, well nourished, alert, in no distress and non-toxic and no overt pain behavior    Eyes: anicteric  HEENT: grossly intact  Neck: supple, symmetric, trachea midline and no masses   Pulmonary:even and unlabored  Cardiovascular:No edema or pitting edema present  Skin:Normal without rashes or lesions and well hydrated  Psychiatric:Mood and affect appropriate  Neurologic:Cranial Nerves II-XII grossly intact  Musculoskeletal:  Antalgic gait  Negative seated straight leg raise bilaterally  Bilateral lumbar paraspinals tender to palpation from L2-L5  Bilateral lower extremity strength 5/5 in all muscle groups

## 2018-03-18 ENCOUNTER — HOSPITAL ENCOUNTER (EMERGENCY)
Facility: HOSPITAL | Age: 53
Discharge: HOME | End: 2018-03-18
Payer: COMMERCIAL

## 2018-03-18 VITALS
OXYGEN SATURATION: 100 % | TEMPERATURE: 98.2 F | DIASTOLIC BLOOD PRESSURE: 85 MMHG | WEIGHT: 138 LBS | SYSTOLIC BLOOD PRESSURE: 172 MMHG | BODY MASS INDEX: 26.07 KG/M2 | RESPIRATION RATE: 16 BRPM | HEART RATE: 106 BPM

## 2018-03-18 DIAGNOSIS — J02.9 PHARYNGITIS: Primary | ICD-10-CM

## 2018-03-18 DIAGNOSIS — H92.02 LEFT EAR PAIN: ICD-10-CM

## 2018-03-18 LAB — S PYO AG THROAT QL: NEGATIVE

## 2018-03-18 PROCEDURE — 87070 CULTURE OTHR SPECIMN AEROBIC: CPT | Performed by: PHYSICIAN ASSISTANT

## 2018-03-18 PROCEDURE — 87430 STREP A AG IA: CPT | Performed by: PHYSICIAN ASSISTANT

## 2018-03-18 PROCEDURE — 99283 EMERGENCY DEPT VISIT LOW MDM: CPT

## 2018-03-18 RX ORDER — ACETAMINOPHEN 325 MG/1
650 TABLET ORAL ONCE
Status: COMPLETED | OUTPATIENT
Start: 2018-03-18 | End: 2018-03-18

## 2018-03-18 RX ADMIN — ACETAMINOPHEN 650 MG: 325 TABLET, FILM COATED ORAL at 20:26

## 2018-03-19 ENCOUNTER — TELEPHONE (OUTPATIENT)
Dept: OBGYN CLINIC | Facility: HOSPITAL | Age: 53
End: 2018-03-19

## 2018-03-19 NOTE — TELEPHONE ENCOUNTER
Caller: patient  Call back number: [phone]    Patient called stating she is moving and was told by Dr Pedro Davidson that she can get a 3 month supply of medication so she has it through the move   She just wanted to let you know since she is coming in on 3/21

## 2018-03-19 NOTE — ED PROVIDER NOTES
History  Chief Complaint   Patient presents with   Yen Layer     pt reports swollen glands in her neck x3 days and pain in left ear  subjective fever  52y  o female with PMH of GERD, anxiety, arthritis, bipolar, depression, fibromyalgia, HTN, migraine, overactive bladder, panic attack, PTSD and stroke presents to the ER for left ear pain and sore throat for 3 days  Patient has been drinking tea and honey without relief  She also take Aspirin daily  She describes her pain as throbbing and radiating to her left ear  She rates her pain 9/10 and states it is constant but worse with swallowing  She denies sick contacts or recent travel  Associated symptoms: subjective fever and rhinorrhea  Patient denies chills, chest pain, dyspnea, N/V/D, abdominal pain, weakness or paresthesias  History provided by:  Patient   used: No        Prior to Admission Medications   Prescriptions Last Dose Informant Patient Reported? Taking?    Cholecalciferol (VITAMIN D3) 1000 units CAPS   No No   Sig: Take 1 capsule (1,000 Units total) by mouth daily   LORazepam (ATIVAN) 2 mg tablet   No No   Sig: Take 1 tablet (2 mg total) by mouth 3 (three) times a day   acetaminophen-codeine (TYLENOL #3) 300-30 mg per tablet   No No   Sig: Take 1 tablet by mouth 2 (two) times a day as needed for moderate pain   amLODIPine (NORVASC) 10 mg tablet   No No   Sig: Take 1 tablet (10 mg total) by mouth daily   aspirin 81 MG tablet   No No   Sig: Take 1 tablet (81 mg total) by mouth daily   gabapentin (NEURONTIN) 300 mg capsule   No No   Sig: Take 1 capsule (300 mg total) by mouth daily at bedtime To be added to 600mg capsule for a total of 900mg qhs   gabapentin (NEURONTIN) 600 MG tablet   No No   Sig: Take 1 tablet (600 mg total) by mouth daily at bedtime   meloxicam (MOBIC) 7 5 mg tablet   No No   Sig: Take 1 tablet (7 5 mg total) by mouth 2 (two) times a day as needed for mild pain   morphine (MS CONTIN) 15 mg 12 hr tablet No No   Sig: Take 1 tablet (15 mg total) by mouth daily Earliest Fill Date: 3/16/18 Max Daily Amount: 15 mg   omeprazole (PriLOSEC) 20 mg delayed release capsule   No No   Sig: Take 1 capsule (20 mg total) by mouth daily   primidone (MYSOLINE) 250 mg tablet   No No   Sig: Take 1 tablet (250 mg total) by mouth daily at bedtime   propranolol (INDERAL) 20 mg tablet   No No   Sig: Take 1 tablet (20 mg total) by mouth 2 (two) times a day   risperiDONE (RisperDAL) 2 mg tablet   No No   Sig: Take 1 tablet (2 mg total) by mouth daily at bedtime   solifenacin (VESICARE) 10 MG tablet   No No   Sig: Take 1 tablet (10 mg total) by mouth daily   tiZANidine (ZANAFLEX) 2 mg tablet   No No   Sig: Take 1 tablet (2 mg total) by mouth every 8 (eight) hours as needed for muscle spasms   traZODone (DESYREL) 150 mg tablet   No No   Sig: Take 1 tablet (150 mg total) by mouth daily at bedtime   venlafaxine (EFFEXOR) 25 mg tablet   No No   Sig: Take 1 tablet (25 mg total) by mouth 3 (three) times a day      Facility-Administered Medications: None       Past Medical History:   Diagnosis Date    Acid reflux     Anxiety     RESOLVED: [id number]    Arthritis     Bipolar 2 disorder (HCC)     FOLLOWS WITH PSYCHIATRIST  CONTINUE LAMOTRIGINE; RESOLVED: [id number]    Depression     Familial tremor     both hands    Fibromyalgia     LAST ASSESSED: [id number]    Hearing aid worn     left ear    [place] (hard of hearing)     left ear    Hypertension     Left-sided weakness     Lower back pain     Memory loss of unknown cause     long and short term    Migraine     Overactive bladder     Panic attack     Post traumatic stress disorder     Seasonal allergies     Stroke [facility])     questionable stroke 2009    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    CONT ASA 81 MG DAILY; RESOLVED: [id number]    Urinary incontinence     Wears dentures     partial lower / full upper    Wears glasses        Past Surgical History: Procedure Laterality Date     SECTION      COLONOSCOPY      RESOLVED: [id number]    EAR SURGERY      HYSTERECTOMY      MYRINGOTOMY W/ TUBES Left     [place] CYSTOURETHROSCOPY N/A 2016    Procedure: CYSTOSCOPY, BOTOX INJECTION;  Surgeon: Kirstie Hernandez MD;  [facility];  Service: Gynecology    TONSILLECTOMY      TUBAL LIGATION         Family History   Problem Relation Age of Onset    Colon cancer Mother     Alzheimer's disease Father     Stroke Father     Colon cancer Brother     Bipolar disorder Brother     Breast cancer Maternal Aunt     Heart disease Other     Diabetes Other     Hypertension Other     Seizures Son      I have reviewed and agree with the history as documented  Social History   Substance Use Topics    Smoking status: Never Smoker    Smokeless tobacco: Never Used    Alcohol use No      Comment: 2 x year; being a social drinker as per all scripts         Review of Systems   Constitutional: Positive for fever (subjective)  Negative for activity change, appetite change and chills  HENT: Positive for ear pain (left), rhinorrhea and sore throat  Negative for congestion, drooling, ear discharge and facial swelling  Eyes: Negative for redness  Respiratory: Negative for shortness of breath  Cardiovascular: Negative for chest pain  Gastrointestinal: Negative for abdominal pain, diarrhea, nausea and vomiting  Musculoskeletal: Negative for neck stiffness  Skin: Negative for rash  Allergic/Immunologic: Negative for food allergies  Neurological: Negative for weakness and numbness         Physical Exam  ED Triage Vitals   Temperature Pulse Respirations Blood Pressure SpO2   18   98 2 °F (36 8 °C) (!) 106 16 (!) 238/123 100 %      Temp Source Heart Rate Source Patient Position - Orthostatic VS BP Location FiO2 (%)   18 --   Temporal Monitor Sitting Right arm       Pain Score       --                  Orthostatic Vital Signs  Vitals:    03/18/18 1945 03/18/18 1949   BP: (!) 238/123 (!) 172/85   Pulse: (!) 106    Patient Position - Orthostatic VS: Sitting Sitting       Physical Exam   Constitutional:  Non-toxic appearance  No distress  HENT:   Head: Normocephalic and atraumatic  Right Ear: Tympanic membrane, external ear and ear canal normal  No drainage, swelling or tenderness  No foreign bodies  Tympanic membrane is not erythematous  No hemotympanum  Left Ear: Tympanic membrane, external ear and ear canal normal  No drainage, swelling or tenderness  No foreign bodies  Tympanic membrane is not erythematous  No hemotympanum  Nose: Nose normal    Mouth/Throat: Uvula is midline and mucous membranes are normal  No uvula swelling  Posterior oropharyngeal erythema present  No posterior oropharyngeal edema or tonsillar abscesses  No tonsillar exudate  Eyes: Conjunctivae, EOM and lids are normal  Pupils are equal, round, and reactive to light  Neck: Normal range of motion and phonation normal  Neck supple  Normal carotid pulses present  No tracheal deviation present  Cardiovascular: Normal rate, regular rhythm, S1 normal, S2 normal and normal heart sounds  Exam reveals no gallop and no friction rub  No murmur heard  Pulmonary/Chest: Effort normal and breath sounds normal  No respiratory distress  She has no decreased breath sounds  She has no wheezes  She has no rhonchi  She has no rales  She exhibits no tenderness  Musculoskeletal:        Cervical back: Normal         Thoracic back: Normal         Lumbar back: Normal    Lymphadenopathy:     She has no cervical adenopathy  Neurological: She is alert  She has normal strength  No sensory deficit  Gait normal  GCS eye subscore is 4  GCS verbal subscore is 5  GCS motor subscore is 6  Skin: Skin is warm and dry  No rash noted     Psychiatric: She has a normal mood and affect  Nursing note and vitals reviewed  ED Medications  Medications   acetaminophen (TYLENOL) tablet 650 mg (650 mg Oral Given 3/18/18 2026)       Diagnostic Studies  Results Reviewed     Procedure Component Value Units Date/Time    Rapid Beta strep screen [[id number]]  (Normal) Collected:  03/18/18 2018    Lab Status:  Final result Specimen:  Throat from Throat Updated:  03/18/18 2040     Rapid Strep A Screen Negative    Throat culture [[id number]] Collected:  03/18/18 2018    Lab Status: In process Specimen:  Throat from Throat Updated:  03/18/18 2040                 No orders to display              Procedures  Procedures       Phone Contacts  ED Phone Contact    ED Course  ED Course                                MDM  Number of Diagnoses or Management Options  Left ear pain: new and requires workup  Pharyngitis: new and requires workup  Diagnosis management comments: DDX consists of but not limited to: viral syndrome, strep, abscess, otitis media, otitis externa    Will check strep  At discharge, I instructed the patient to:  -follow up with pcp  -take Tylenol or Motrin for pain  -rest and drink plenty of fluids  -return to the ER if symptoms worsened or new symptoms arose  Patient agreed to this plan and was stable at time of discharge  Amount and/or Complexity of Data Reviewed  Clinical lab tests: ordered and reviewed    Patient Progress  Patient progress: stable    CritCare Time    Disposition  Final diagnoses:   Pharyngitis   Left ear pain     Time reflects when diagnosis was documented in both MDM as applicable and the Disposition within this note     Time User Action Codes Description Comment    3/18/2018  8:41 PM Aleja BUCHANAN Add [J02 9] Pharyngitis     3/18/2018  8:42 PM Aleja BUCHANAN Add [H92 02] Left ear pain       ED Disposition     ED Disposition Condition Comment    Discharge  Samantha Rodriguez discharge to home/self care      Condition at discharge: Stable        Follow-up Information     Follow up With Specialties Details Why [facility] Schedule an appointment as soon as possible for a visit in 1 day  [address] [place] [address]  [id number] [phone]        Patient's Medications   Discharge Prescriptions    No medications on file     No discharge procedures on file      ED Provider  Electronically Signed by           Ramses Moran PA-C  03/18/18 2046

## 2018-03-19 NOTE — DISCHARGE INSTRUCTIONS
Pharyngitis   WHAT YOU NEED TO KNOW:   Pharyngitis, or sore throat, is inflammation of the tissues and structures in your pharynx (throat)  Pharyngitis is most often caused by bacteria  It may also be caused by a cold or flu virus  Other causes include smoking, allergies, or acid reflux  DISCHARGE INSTRUCTIONS:   Call 911 for any of the following:   · You have trouble breathing or swallowing because your throat is swollen or sore  Return to the emergency department if:   · You are drooling because it hurts too much to swallow  · Your fever is higher than 102? F (39?C) or lasts longer than 3 days  · You are confused  · You taste blood in your throat  Contact your healthcare provider if:   · Your throat pain gets worse  · You have a painful lump in your throat that does not go away after 5 days  · Your symptoms do not improve after 5 days  · You have questions or concerns about your condition or care  Medicines:  Viral pharyngitis will go away on its own without treatment  Your sore throat should start to feel better in 3 to 5 days for both viral and bacterial infections  You may need any of the following:  · Antibiotics  treat a bacterial infection  · NSAIDs , such as ibuprofen, help decrease swelling, pain, and fever  NSAIDs can cause stomach bleeding or kidney problems in certain people  If you take blood thinner medicine, always ask your healthcare provider if NSAIDs are safe for you  Always read the medicine label and follow directions  · Acetaminophen  decreases pain and fever  It is available without a doctor's order  Ask how much to take and how often to take it  Follow directions  Acetaminophen can cause liver damage if not taken correctly  · Take your medicine as directed  Contact your healthcare provider if you think your medicine is not helping or if you have side effects  Tell him or her if you are allergic to any medicine   Keep a list of the medicines, vitamins, and herbs you take  Include the amounts, and when and why you take them  Bring the list or the pill bottles to follow-up visits  Carry your medicine list with you in case of an emergency  Manage your symptoms:   · Gargle salt water  Mix ¼ teaspoon salt in an 8 ounce glass of warm water and gargle  This may help decrease swelling in your throat  · Drink liquids as directed  You may need to drink more liquids than usual  Liquids may help soothe your throat and prevent dehydration  Ask how much liquid to drink each day and which liquids are best for you  · Use a cool-steam humidifier  to help moisten the air in your room and calm your cough  · Soothe your throat  with cough drops, ice, soft foods, or popsicles  Prevent the spread of pharyngitis:  Cover your mouth and nose when you cough or sneeze  Do not share food or drinks  Wash your hands often  Use soap and water  If soap and water are unavailable, use an alcohol based hand   Follow up with your healthcare provider as directed:  Write down your questions so you remember to ask them during your visits  © 2017 [facility] Information is for End User's use only and may not be sold, redistributed or otherwise used for commercial purposes  All illustrations and images included in CareNotes® are the copyrighted property of A D A M , Inc  or Mateusz Rand  The above information is an  only  It is not intended as medical advice for individual conditions or treatments  Talk to your doctor, nurse or pharmacist before following any medical regimen to see if it is safe and effective for you  DISCHARGE INSTRUCTIONS:    FOLLOW UP WITH YOUR PRIMARY CARE PROVIDER OR THE [address] [place] [address]  MAKE AN APPOINTMENT TO BE SEEN  TAKE TYLENOL OR MOTRIN FOR PAIN  REST AND DRINK PLENTY OF FLUIDS  IF SYMPTOMS WORSEN OR NEW SYMPTOMS ARISE, RETURN TO THE ER TO BE SEEN

## 2018-03-20 ENCOUNTER — TELEPHONE (OUTPATIENT)
Dept: BEHAVIORAL/MENTAL HEALTH CLINIC | Facility: CLINIC | Age: 53
End: 2018-03-20

## 2018-03-20 NOTE — TELEPHONE ENCOUNTER
Prior authorization request submitted via [facility] [employer] for Lorazepam

## 2018-03-21 ENCOUNTER — TELEPHONE (OUTPATIENT)
Dept: PAIN MEDICINE | Facility: CLINIC | Age: 53
End: 2018-03-21

## 2018-03-21 DIAGNOSIS — G89.4 CHRONIC PAIN DISORDER: Primary | ICD-10-CM

## 2018-03-21 DIAGNOSIS — M79.7 FIBROMYALGIA: ICD-10-CM

## 2018-03-21 DIAGNOSIS — M54.42 CHRONIC BILATERAL LOW BACK PAIN WITH BILATERAL SCIATICA: ICD-10-CM

## 2018-03-21 DIAGNOSIS — M54.41 CHRONIC BILATERAL LOW BACK PAIN WITH BILATERAL SCIATICA: ICD-10-CM

## 2018-03-21 DIAGNOSIS — M54.16 LUMBAR RADICULOPATHY: ICD-10-CM

## 2018-03-21 DIAGNOSIS — G89.29 CHRONIC BILATERAL LOW BACK PAIN WITH BILATERAL SCIATICA: ICD-10-CM

## 2018-03-21 LAB — BACTERIA THROAT CULT: NORMAL

## 2018-03-21 RX ORDER — ACETAMINOPHEN AND CODEINE PHOSPHATE 300; 30 MG/1; MG/1
1 TABLET ORAL EVERY 6 HOURS PRN
Qty: 120 TABLET | Refills: 0 | Status: SHIPPED | OUTPATIENT
Start: 2018-03-21 | End: 2019-08-23 | Stop reason: ALTCHOICE

## 2018-03-21 RX ORDER — GABAPENTIN 600 MG/1
TABLET ORAL
Qty: 90 TABLET | Refills: 0 | Status: SHIPPED | OUTPATIENT
Start: 2018-03-21 | End: 2019-08-23

## 2018-03-21 RX ORDER — GABAPENTIN 300 MG/1
CAPSULE ORAL
Qty: 90 CAPSULE | Refills: 0 | Status: SHIPPED | OUTPATIENT
Start: 2018-03-21 | End: 2019-08-23 | Stop reason: DRUGHIGH

## 2018-03-21 RX ORDER — MELOXICAM 7.5 MG/1
7.5 TABLET ORAL 2 TIMES DAILY PRN
Qty: 180 TABLET | Refills: 0 | Status: SHIPPED | OUTPATIENT
Start: 2018-03-21 | End: 2019-08-23 | Stop reason: ALTCHOICE

## 2018-03-21 RX ORDER — MORPHINE SULFATE 15 MG/1
15 TABLET, FILM COATED, EXTENDED RELEASE ORAL 2 TIMES DAILY
Qty: 60 TABLET | Refills: 0 | Status: SHIPPED | OUTPATIENT
Start: 2018-03-21 | End: 2019-08-23 | Stop reason: SDUPTHER

## 2018-03-21 RX ORDER — TIZANIDINE 2 MG/1
2 TABLET ORAL EVERY 8 HOURS PRN
Qty: 270 TABLET | Refills: 0 | Status: SHIPPED | OUTPATIENT
Start: 2018-03-21 | End: 2019-08-23 | Stop reason: ALTCHOICE

## 2018-03-21 NOTE — TELEPHONE ENCOUNTER
S/w the patient and reviewed the previous task  Instructions reviewed and clarified  She is inquiring to see if she could have a refill on the meloxicam  Dg to advise   Thanks

## 2018-03-21 NOTE — TELEPHONE ENCOUNTER
----- Message from Opal Hendricks sent at 3/21/2018  7:20 AM EDT -----  Called pt she takes the Amy Kil and doesn't think they will be able to pick her up sooner  She said the prob is she is moving on tues and will need med refill  She wanted to talk to you bc she has [facility] and [facility] [place]  They suggest she get a 90 rx on all prescriptions  She said they will also need prior auth so shes afraid it wont be done in time  Please advise

## 2018-03-21 NOTE — TELEPHONE ENCOUNTER
Can you please call the patient and advise her that I have sent 90 day scripts for the Gabapentin and Tizanidine to her pharmacy  There will be scripts for her Morphine Sulfate ER 15 Qd, but I changed it to BID #60 so that way it will end up being a 60 day supply as she is still to take it Qd  I did the same thing with her T#3 I changed the script to say QID, #120 and as long as she continues to take it BID it will last her 2 months  That is the best I can do with opioid medications and in 2 months she should be able to find another pain provider or PCP that will prescribe  I will work on the pre-auth today  As soon as we know anything we will call her  Thank you  Wish her luck  She will not need a f/u Ov

## 2018-03-22 DIAGNOSIS — F33.2 MDD (MAJOR DEPRESSIVE DISORDER), RECURRENT SEVERE, WITHOUT PSYCHOSIS (HCC): ICD-10-CM

## 2018-03-22 RX ORDER — LORAZEPAM 2 MG/1
2 TABLET ORAL 3 TIMES DAILY
Qty: 90 TABLET | Refills: 2 | Status: SHIPPED | OUTPATIENT
Start: 2018-03-22 | End: 2019-08-23

## 2018-03-22 NOTE — TELEPHONE ENCOUNTER
P A  For Ativan 2mg approved by [facility] notified and at this time awaiting review of message to Dr Ronak Roe for issue of a new script

## 2018-03-22 NOTE — TELEPHONE ENCOUNTER
Attempted to call in script for Ativan 2mg   Tid qty 90 2 refill  [facility] pharmacist stated patient already picked up script this morning so he will place the script called in for the next time the script is due for refill

## 2018-03-23 ENCOUNTER — OFFICE VISIT (OUTPATIENT)
Dept: OBGYN CLINIC | Facility: HOSPITAL | Age: 53
End: 2018-03-23
Payer: COMMERCIAL

## 2018-03-23 VITALS
SYSTOLIC BLOOD PRESSURE: 118 MMHG | BODY MASS INDEX: 25.68 KG/M2 | HEIGHT: 61 IN | WEIGHT: 136 LBS | DIASTOLIC BLOOD PRESSURE: 80 MMHG | HEART RATE: 79 BPM

## 2018-03-23 DIAGNOSIS — M17.0 PRIMARY LOCALIZED OSTEOARTHRITIS OF BOTH KNEES: Primary | ICD-10-CM

## 2018-03-23 PROCEDURE — 99212 OFFICE O/P EST SF 10 MIN: CPT | Performed by: ORTHOPAEDIC SURGERY

## 2018-03-23 PROCEDURE — 20610 DRAIN/INJ JOINT/BURSA W/O US: CPT | Performed by: ORTHOPAEDIC SURGERY

## 2018-03-23 RX ORDER — LIDOCAINE HYDROCHLORIDE 10 MG/ML
1 INJECTION, SOLUTION INFILTRATION; PERINEURAL
Status: COMPLETED | OUTPATIENT
Start: 2018-03-23 | End: 2018-03-23

## 2018-03-23 RX ORDER — BETAMETHASONE SODIUM PHOSPHATE AND BETAMETHASONE ACETATE 3; 3 MG/ML; MG/ML
6 INJECTION, SUSPENSION INTRA-ARTICULAR; INTRALESIONAL; INTRAMUSCULAR; SOFT TISSUE
Status: COMPLETED | OUTPATIENT
Start: 2018-03-23 | End: 2018-03-23

## 2018-03-23 RX ADMIN — BETAMETHASONE SODIUM PHOSPHATE AND BETAMETHASONE ACETATE 6 MG: 3; 3 INJECTION, SUSPENSION INTRA-ARTICULAR; INTRALESIONAL; INTRAMUSCULAR; SOFT TISSUE at 09:16

## 2018-03-23 RX ADMIN — LIDOCAINE HYDROCHLORIDE 1 ML: 10 INJECTION, SOLUTION INFILTRATION; PERINEURAL at 09:16

## 2018-03-23 NOTE — PROGRESS NOTES
Assessment:  1  Primary localized osteoarthritis of both knees       Patient Active Problem List   Diagnosis    Chronic bilateral low back pain with bilateral sciatica    Right knee pain    Chronic pain disorder    Lumbar radiculopathy    Lumbar spondylosis    Fibromyalgia    Lumbar disc disease with radiculopathy    Spinal stenosis of lumbar region with neurogenic claudication    Anxiety    Pain in joint, shoulder region    Bilateral hip pain    Bipolar II disorder ([facility] [address] )    Cognitive disorder    Esophageal reflux    Fatigue    Female pelvic pain    Generalized anxiety disorder    Hypertension    Hypokalemia    Insomnia    Major depressive disorder, recurrent episode, moderate degree (HCC)    Migraine    Opioid dependence (HCC)    Osteoarthritis of both hips    Osteoarthritis of knee    Overactive bladder    Pain, joint, hip    Panic disorder without agoraphobia    Post traumatic stress disorder    Primary localized osteoarthritis of both knees    Recent unexplained weight loss    Sacroiliitis (HCC)    Tremor    Urinary incontinence    Vitamin D deficiency           Plan  Cortisone injection given to both knees follow-up on a p r n  Basis patient is moving to [place] on Wednesday  I did explain to her that is a little soon for having another knee injection she understands the risks including acceleration of arthritis but would like the injection anyway                  Subjective:     Patient ID:    Chief Complaint:Samantha Mckenzie Saint 45 y o  female      HPI     patient comes in today with regards to bilateral knee pain she has bilateral knee arthritis  She had received a cortisone injection into both knees at her last visit back in January  She comes in today shows her knees are acting up she would like a cortisone injection because she is going down to [place] on Wednesday where she will be living but will need time to actually find an orthopedist down there        The following portions of the patient's history were reviewed and updated as appropriate: allergies, current medications, past family history, past social history, past surgical history and problem list         Social History     Social History    Marital status: Single     Spouse name: N/A    Number of children: N/A    Years of education: graduate school      Occupational History    Not on file  Social History Main Topics    Smoking status: Never Smoker    Smokeless tobacco: Never Used    Alcohol use No      Comment: 2 x year; being a social drinker as per all scripts     Drug use: No    Sexual activity: Not on file     Other Topics Concern    Not on file     Social History Narrative    Bereavement    Daily caffeine consumption, 6-8 servings per day     as per all scripts    Lives in [place]      Past Medical History:   Diagnosis Date    Acid reflux     Anxiety     RESOLVED: [id number]    Arthritis     Bipolar 2 disorder ([address] )     FOLLOWS WITH PSYCHIATRIST  CONTINUE LAMOTRIGINE; RESOLVED: [id number]    Depression     Familial tremor     both hands    Fibromyalgia     LAST ASSESSED: [id number]    Hearing aid worn     left ear    [place] (hard of hearing)     left ear    Hypertension     Left-sided weakness     Lower back pain     Memory loss of unknown cause     long and short term    Migraine     Overactive bladder     Panic attack     Post traumatic stress disorder     Seasonal allergies     Stroke [facility])     questionable stroke 2009    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    CONT ASA 81 MG DAILY; RESOLVED: [id number]    Urinary incontinence     Wears dentures     partial lower / full upper    Wears glasses      Past Surgical History:   Procedure Laterality Date     SECTION      COLONOSCOPY      RESOLVED: [id number]    EAR SURGERY      HYSTERECTOMY  2004    MYRINGOTOMY W/ TUBES Left     [place] CYSTOURETHROSCOPY N/A 2016    Procedure: CYSTOSCOPY, BOTOX INJECTION;  Surgeon: Katie Corrigan MD;  [facility];  Service: Gynecology    TONSILLECTOMY     [address] [place]     No Known Allergies  Current Outpatient Prescriptions on File Prior to Visit   Medication Sig Dispense Refill    acetaminophen-codeine (TYLENOL #3) 300-30 mg per tablet Take 1 tablet by mouth every 6 (six) hours as needed for moderate pain 120 tablet 0    amLODIPine (NORVASC) 10 mg tablet Take 1 tablet (10 mg total) by mouth daily 90 tablet 1    aspirin 81 MG tablet Take 1 tablet (81 mg total) by mouth daily 90 tablet 1    Cholecalciferol (VITAMIN D3) 1000 units CAPS Take 1 capsule (1,000 Units total) by mouth daily 90 capsule 1    gabapentin (NEURONTIN) 300 mg capsule To be added to 600mg capsule for a total of 900mg qhs 90 capsule 0    gabapentin (NEURONTIN) 600 MG tablet Take 1 PO HS 90 tablet 0    LORazepam (ATIVAN) 2 mg tablet Take 1 tablet (2 mg total) by mouth 3 (three) times a day 90 tablet 2    meloxicam (MOBIC) 7 5 mg tablet Take 1 tablet (7 5 mg total) by mouth 2 (two) times a day as needed for mild pain 180 tablet 0    morphine (MS CONTIN) 15 mg 12 hr tablet Take 1 tablet (15 mg total) by mouth 2 (two) times a day Max Daily Amount: 30 mg 60 tablet 0    omeprazole (PriLOSEC) 20 mg delayed release capsule Take 1 capsule (20 mg total) by mouth daily 90 capsule 0    primidone (MYSOLINE) 250 mg tablet Take 1 tablet (250 mg total) by mouth daily at bedtime 90 tablet 1    propranolol (INDERAL) 20 mg tablet Take 1 tablet (20 mg total) by mouth 2 (two) times a day 120 tablet 0    risperiDONE (RisperDAL) 2 mg tablet Take 1 tablet (2 mg total) by mouth daily at bedtime 90 tablet 0    solifenacin (VESICARE) 10 MG tablet Take 1 tablet (10 mg total) by mouth daily 90 tablet 0    tiZANidine (ZANAFLEX) 2 mg tablet Take 1 tablet (2 mg total) by mouth every 8 (eight) hours as needed for muscle spasms 270 tablet 0    traZODone (DESYREL) 150 mg tablet Take 1 tablet (150 mg total) by mouth daily at bedtime 90 tablet 0    venlafaxine (EFFEXOR) 25 mg tablet Take 1 tablet (25 mg total) by mouth 3 (three) times a day 90 tablet 0     No current facility-administered medications on file prior to visit  Objective:        Ortho Exam    Large joint arthrocentesis  Date/Time: 3/23/2018 9:16 AM  Consent given by: patient  Supporting Documentation  Indications: pain   Procedure Details  Location: knee - R knee  Needle size: 22 G  Ultrasound guidance: no  Approach: anterolateral  Medications administered: 1 mL lidocaine 1 %; 6 mg betamethasone acetate-betamethasone sodium phosphate 6 (3-3) mg/mL    Patient tolerance: patient tolerated the procedure well with no immediate complications      Large joint arthrocentesis  Date/Time: 3/23/2018 9:16 AM  Consent given by: patient  Timeout: Immediately prior to procedure a time out was called to verify the correct patient, procedure, equipment, support staff and site/side marked as required   Supporting Documentation  Indications: pain   Procedure Details  Location: knee - L knee  Needle size: 22 G  Ultrasound guidance: no  Approach: anterolateral  Medications administered: 1 mL lidocaine 1 %; 6 mg betamethasone acetate-betamethasone sodium phosphate 6 (3-3) mg/mL    Patient tolerance: patient tolerated the procedure well with no immediate complications            Portions of the record may have been created with voice recognition software   Occasional wrong word or "sound a like" substitutions may have occurred due to the inherent limitations of voice recognition software   Read the chart carefully and recognize, using context, where substitutions have occurred

## 2018-03-26 ENCOUNTER — TELEPHONE (OUTPATIENT)
Dept: PAIN MEDICINE | Facility: CLINIC | Age: 53
End: 2018-03-26

## 2018-03-26 NOTE — TELEPHONE ENCOUNTER
Pt is moving out of state  Stated she only received 30 days instead of 60 or 90 days of her morphine 15mg and tylenol 3 with codeine  Please call [phone]

## 2018-03-26 NOTE — TELEPHONE ENCOUNTER
Both of the scripts were double, so she should have received 60 MSER, but was only to take 1 pill daily as she is taking it, but it will last 60 days  Also the T#3 was changed to 1 QID, #120, but she is still only to take 1 PO BID PRN so it will last 60 days  Thank you

## 2018-03-26 NOTE — TELEPHONE ENCOUNTER
MSG left on patient VM about medication authorization  While leaving [place], I noticed patient did not have f/u for refill on medication which she will need around April 14th  Please contact patient to schedule appointment

## 2018-03-26 NOTE — TELEPHONE ENCOUNTER
Can you please call the patient and advise her that both her MS ER and T#3 were APPROVED through her insurance  Thank you

## 2018-03-27 NOTE — TELEPHONE ENCOUNTER
S/w the patient again and reviewed the previous instructions  Clarified the dosage and amounts with the patient and stated she has enough medication to last 2 months  The patient verbalized understanding

## 2018-03-27 NOTE — TELEPHONE ENCOUNTER
[facility]- patient called back & LM on ansjair to return call   c/b [phone]

## 2018-03-27 NOTE — TELEPHONE ENCOUNTER
S/w the Eber Montana at [facility]111 to clarify and he stated she received #60 of the morphine and #120 of the tylenol #3

## 2018-03-27 NOTE — TELEPHONE ENCOUNTER
S/w the patient to clarify and she stated that she only has #60 of the Morphine tablets  Will contact the pharmacy to clarify

## 2018-04-10 ENCOUNTER — TELEPHONE (OUTPATIENT)
Dept: PAIN MEDICINE | Facility: MEDICAL CENTER | Age: 53
End: 2018-04-10

## 2018-04-10 NOTE — TELEPHONE ENCOUNTER
[address] [facility]- patient called stating she moved out of the area and have yet to find another doctor  Patient would like to s/w someone about her pain and meds   C/b [phone]

## 2018-04-10 NOTE — TELEPHONE ENCOUNTER
S/w the patient and she stated she continues with pain in both of her legs  The last shot did not help at all  Encouraged her to seek a new pain mangement physician in [facility]  Stated that we could not change her medications unless she was seen in the office  Encouraged her to use ice vs heat  She stated she was just wondering if there was anything else she could do to relieve the pain  JW to advise otherwise   Thanks

## 2018-05-22 DIAGNOSIS — R25.1 TREMOR: ICD-10-CM

## 2018-05-22 RX ORDER — PROPRANOLOL HYDROCHLORIDE 20 MG/1
20 TABLET ORAL 2 TIMES DAILY
Qty: 120 TABLET | Refills: 6 | Status: SHIPPED | OUTPATIENT
Start: 2018-05-22 | End: 2019-08-29 | Stop reason: SDUPTHER

## 2018-06-17 DIAGNOSIS — M79.7 FIBROMYALGIA: ICD-10-CM

## 2018-06-17 DIAGNOSIS — M54.42 CHRONIC BILATERAL LOW BACK PAIN WITH BILATERAL SCIATICA: ICD-10-CM

## 2018-06-17 DIAGNOSIS — M54.16 LUMBAR RADICULOPATHY: ICD-10-CM

## 2018-06-17 DIAGNOSIS — F33.2 MDD (MAJOR DEPRESSIVE DISORDER), RECURRENT SEVERE, WITHOUT PSYCHOSIS (HCC): ICD-10-CM

## 2018-06-17 DIAGNOSIS — G89.29 CHRONIC BILATERAL LOW BACK PAIN WITH BILATERAL SCIATICA: ICD-10-CM

## 2018-06-17 DIAGNOSIS — M54.41 CHRONIC BILATERAL LOW BACK PAIN WITH BILATERAL SCIATICA: ICD-10-CM

## 2018-06-18 RX ORDER — TRAZODONE HYDROCHLORIDE 150 MG/1
TABLET ORAL
Qty: 90 TABLET | Refills: 0 | Status: SHIPPED | OUTPATIENT
Start: 2018-06-18 | End: 2019-08-29 | Stop reason: SDDI

## 2018-06-18 RX ORDER — VENLAFAXINE 25 MG/1
TABLET ORAL
Qty: 270 TABLET | Refills: 0 | Status: SHIPPED | OUTPATIENT
Start: 2018-06-18 | End: 2019-08-23 | Stop reason: ALTCHOICE

## 2018-06-18 RX ORDER — TIZANIDINE 2 MG/1
2 TABLET ORAL EVERY 8 HOURS PRN
Qty: 270 TABLET | Refills: 0 | OUTPATIENT
Start: 2018-06-18

## 2018-06-18 RX ORDER — GABAPENTIN 600 MG/1
TABLET ORAL
Qty: 90 TABLET | Refills: 0 | OUTPATIENT
Start: 2018-06-18

## 2018-06-18 RX ORDER — GABAPENTIN 300 MG/1
CAPSULE ORAL
Qty: 90 CAPSULE | Refills: 0 | OUTPATIENT
Start: 2018-06-18

## 2018-07-13 ENCOUNTER — TELEPHONE (OUTPATIENT)
Dept: PAIN MEDICINE | Facility: CLINIC | Age: 53
End: 2018-07-13

## 2019-08-15 ENCOUNTER — TELEPHONE (OUTPATIENT)
Dept: PAIN MEDICINE | Facility: MEDICAL CENTER | Age: 54
End: 2019-08-15

## 2019-08-15 NOTE — TELEPHONE ENCOUNTER
Pt called stating she fell trying to get into a chair,  Pt  Stated she was in a lot of pain and asked if she should go to the ED  I asked pt if she felt she did something in the fall and she said yes     Pt stated she would go to the hospital

## 2019-08-16 ENCOUNTER — APPOINTMENT (EMERGENCY)
Dept: RADIOLOGY | Facility: HOSPITAL | Age: 54
End: 2019-08-16
Payer: MEDICARE

## 2019-08-16 ENCOUNTER — HOSPITAL ENCOUNTER (EMERGENCY)
Facility: HOSPITAL | Age: 54
Discharge: HOME | End: 2019-08-16
Attending: EMERGENCY MEDICINE
Payer: MEDICARE

## 2019-08-16 VITALS
HEART RATE: 80 BPM | OXYGEN SATURATION: 98 % | WEIGHT: 170 LBS | SYSTOLIC BLOOD PRESSURE: 178 MMHG | BODY MASS INDEX: 32.12 KG/M2 | RESPIRATION RATE: 18 BRPM | DIASTOLIC BLOOD PRESSURE: 85 MMHG | TEMPERATURE: 98.2 F

## 2019-08-16 DIAGNOSIS — Z76.5 DRUG-SEEKING BEHAVIOR: ICD-10-CM

## 2019-08-16 DIAGNOSIS — M17.10 OSTEOARTHRITIS OF KNEE: ICD-10-CM

## 2019-08-16 DIAGNOSIS — M25.561 BILATERAL CHRONIC KNEE PAIN: Primary | ICD-10-CM

## 2019-08-16 DIAGNOSIS — K21.9 GERD (GASTROESOPHAGEAL REFLUX DISEASE): ICD-10-CM

## 2019-08-16 DIAGNOSIS — I10 HYPERTENSION: ICD-10-CM

## 2019-08-16 DIAGNOSIS — M25.562 BILATERAL CHRONIC KNEE PAIN: Primary | ICD-10-CM

## 2019-08-16 DIAGNOSIS — Z76.0 MEDICATION REFILL: ICD-10-CM

## 2019-08-16 DIAGNOSIS — R25.1 TREMOR: ICD-10-CM

## 2019-08-16 DIAGNOSIS — G89.29 BILATERAL CHRONIC KNEE PAIN: Primary | ICD-10-CM

## 2019-08-16 PROCEDURE — 99283 EMERGENCY DEPT VISIT LOW MDM: CPT

## 2019-08-16 PROCEDURE — 73564 X-RAY EXAM KNEE 4 OR MORE: CPT

## 2019-08-16 PROCEDURE — 99283 EMERGENCY DEPT VISIT LOW MDM: CPT | Performed by: PHYSICIAN ASSISTANT

## 2019-08-16 RX ORDER — AMLODIPINE BESYLATE 10 MG/1
10 TABLET ORAL DAILY
Qty: 30 TABLET | Refills: 0 | Status: SHIPPED | OUTPATIENT
Start: 2019-08-16 | End: 2019-08-23

## 2019-08-16 NOTE — ED PROVIDER NOTES
History  Chief Complaint   Patient presents with    Knee Pain     Pt  reports falling forward and hit both of her knees  Pt  reports pain in both of her knees  Pt  reports a hx of knee pain  This is a 59-year-old female patient who has chronic knee pain bilaterally secondary osteoarthritis  Has been seen by Orthopedics  Chronically in a wheelchair due to failed back surgery some scoliosis  States she slept better wheelchair landed directly on both her knees  Has pain in her knees that is not like her usual chronic pain  She has full range of motion of her knees there is no sign of trauma she denies any redness or warmth no fever no chills no headache blurred vision double vision cough congestion sore throat nausea vomiting diarrhea abdominal pain no chest pain or shortness of breath  No urgency frequency or dysuria  She has taken her pain medication and she has home and states that is not helping  Patient will have x-ray of both knees          Prior to Admission Medications   Prescriptions Last Dose Informant Patient Reported? Taking?    Cholecalciferol (VITAMIN D3) 1000 units CAPS  Self No Yes   Sig: Take 1 capsule (1,000 Units total) by mouth daily   LORazepam (ATIVAN) 2 mg tablet  Self No Yes   Sig: Take 1 tablet (2 mg total) by mouth 3 (three) times a day   acetaminophen-codeine (TYLENOL #3) 300-30 mg per tablet  Self No Yes   Sig: Take 1 tablet by mouth every 6 (six) hours as needed for moderate pain   amLODIPine (NORVASC) 10 mg tablet  Self No Yes   Sig: Take 1 tablet (10 mg total) by mouth daily   amLODIPine (NORVASC) 10 mg tablet   No No   Sig: Take 1 tablet (10 mg total) by mouth daily   aspirin 81 MG tablet Not Taking at Unknown time Self No No   Sig: Take 1 tablet (81 mg total) by mouth daily   Patient not taking: Reported on 8/16/2019   aspirin 81 MG tablet   No No   Sig: Take 1 tablet (81 mg total) by mouth daily   gabapentin (NEURONTIN) 300 mg capsule  Self No Yes   Sig: To be added to 600mg capsule for a total of 900mg qhs   gabapentin (NEURONTIN) 600 MG tablet  Self No Yes   Sig: Take 1 PO HS   meloxicam (MOBIC) 7 5 mg tablet Not Taking at Unknown time Self No No   Sig: Take 1 tablet (7 5 mg total) by mouth 2 (two) times a day as needed for mild pain   Patient not taking: Reported on 8/16/2019   morphine (MS CONTIN) 15 mg 12 hr tablet  Self No Yes   Sig: Take 1 tablet (15 mg total) by mouth 2 (two) times a day Max Daily Amount: 30 mg   omeprazole (PriLOSEC) 20 mg delayed release capsule Not Taking at Unknown time Self No No   Sig: Take 1 capsule (20 mg total) by mouth daily   Patient not taking: Reported on 8/16/2019   primidone (MYSOLINE) 250 mg tablet  Self No Yes   Sig: Take 1 tablet (250 mg total) by mouth daily at bedtime   propranolol (INDERAL) 20 mg tablet   No Yes   Sig: Take 1 tablet (20 mg total) by mouth 2 (two) times a day   risperiDONE (RisperDAL) 2 mg tablet  Self No Yes   Sig: Take 1 tablet (2 mg total) by mouth daily at bedtime   solifenacin (VESICARE) 10 MG tablet Not Taking at Unknown time Self No No   Sig: Take 1 tablet (10 mg total) by mouth daily   Patient not taking: Reported on 8/16/2019   tiZANidine (ZANAFLEX) 2 mg tablet Not Taking at Unknown time Self No No   Sig: Take 1 tablet (2 mg total) by mouth every 8 (eight) hours as needed for muscle spasms   Patient not taking: Reported on 8/16/2019   traZODone (DESYREL) 150 mg tablet Not Taking at Unknown time  No No   Sig: TAKE 1 TABLET EVERY DAY AT BEDTIME   Patient not taking: Reported on 8/16/2019   venlafaxine (EFFEXOR) 25 mg tablet Not Taking at Unknown time  No No   Sig: TAKE 1 TABLET 3 TIMES A DAY   Patient not taking: Reported on 8/16/2019      Facility-Administered Medications: None       Past Medical History:   Diagnosis Date    Acid reflux     Anxiety     RESOLVED: [id number]    Arthritis     Bipolar 2 disorder (HCC)     FOLLOWS WITH PSYCHIATRIST   CONTINUE LAMOTRIGINE; RESOLVED: [id number]    Depression     Familial tremor     both hands    Fibromyalgia     LAST ASSESSED: [id number]    Hearing aid worn     left ear    [place] (hard of hearing)     left ear    Hypertension     Left-sided weakness     Lower back pain     Memory loss of unknown cause     long and short term    Migraine     Overactive bladder     Panic attack     Post traumatic stress disorder     Seasonal allergies     Stroke 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  CONT ASA 81 MG DAILY; RESOLVED: [id number]    Urinary incontinence     Wears dentures     partial lower / full upper    Wears glasses        Past Surgical History:   Procedure Laterality Date     SECTION      COLONOSCOPY      RESOLVED: [id number]    EAR SURGERY      HYSTERECTOMY  2004    MYRINGOTOMY W/ TUBES Left     [place] CYSTOURETHROSCOPY N/A 2016    Procedure: CYSTOSCOPY, BOTOX INJECTION;  Surgeon: Deyanira Colón MD;  Location: [facility];  Service: Gynecology    TONSILLECTOMY      TUBAL LIGATION         Family History   Problem Relation Age of Onset    Colon cancer Mother     Alzheimer's disease Father     Stroke Father     Colon cancer Brother     Bipolar disorder Brother     Breast cancer Maternal Aunt     Heart disease Other     Diabetes Other     Hypertension Other     Seizures Son      I have reviewed and agree with the history as documented  Social History     Tobacco Use    Smoking status: Never Smoker    Smokeless tobacco: Never Used   Substance Use Topics    Alcohol use: No     Comment: 2 x year; being a social drinker as per all scripts     Drug use: No        Review of Systems   All other systems reviewed and are negative  Physical Exam  Physical Exam   Constitutional: She appears well-developed and well-nourished  HENT:   Head: Normocephalic and atraumatic     Right Ear: External ear normal    Left Ear: External ear normal    Nose: Nose normal  Mouth/Throat: Oropharynx is clear and moist    Eyes: Pupils are equal, round, and reactive to light  Conjunctivae are normal    Neck: Normal range of motion  Neck supple  Cardiovascular: Normal rate and regular rhythm  Pulmonary/Chest: Effort normal and breath sounds normal    Abdominal: Soft  Bowel sounds are normal  There is no tenderness  Musculoskeletal: Normal range of motion  Legs:  Neurological: She is alert  Skin: Skin is warm  Psychiatric: She has a normal mood and affect  Her behavior is normal    Nursing note and vitals reviewed  Vital Signs  ED Triage Vitals [08/16/19 1734]   Temperature Pulse Respirations Blood Pressure SpO2   98 2 °F (36 8 °C) 80 18 (!) 178/85 98 %      Temp Source Heart Rate Source Patient Position - Orthostatic VS BP Location FiO2 (%)   Oral Monitor Sitting Right arm --      Pain Score       9           Vitals:    08/16/19 1734   BP: (!) 178/85   Pulse: 80   Patient Position - Orthostatic VS: Sitting         Visual Acuity      ED Medications  Medications - No data to display    Diagnostic Studies  Results Reviewed     None                 XR knee 4+ views Right injury   ED Interpretation by Eleazar Au PA-C (08/16 1832)   Severe osteoarthritis no fracture      Final Result by Malia Espinoza MD (08/17 2215)      Osteoarthritis which is slightly more impressive than 2 years ago      New joint effusion  Please see above            Workstation performed: [id number]         XR knee 4+ views left injury   ED Interpretation by Eleazar Au PA-C (08/16 1832)   Severe osteoarthritis no fracture      Final Result by Malia Espinoza MD (08/17 7331)      No acute osseous abnormality  Workstation performed: [id number]                    Procedures  Procedures       ED Course  ED Course as of Aug 19 0902   Fri Aug 16, 2019   1838 At time of discharge patient then asked for refills on her narcotic pain medicines    I explained her that I could not refill her chronic pain medications  She then asked for her blood pressure medication I have no issue refilling life-sustaining medication  1900 After discussing patient's medications she is very indecisive is of her medication  Only feels safe to give her her blood pressure medication and some aspirin and she will have to follow up the clinic for ongoing medications  MDM    Disposition  Final diagnoses:   Bilateral chronic knee pain   Osteoarthritis of knee   Medication refill   Drug-seeking behavior     Time reflects when diagnosis was documented in both MDM as applicable and the Disposition within this note     Time User Action Codes Description Comment    8/16/2019  6:32 PM DinapoliBobbyJamaal Add [M25 561,  M25 562,  G89 29] Bilateral chronic knee pain     8/16/2019  6:33 PM DinDemetrius ruanok Add [M17 10] Osteoarthritis of knee     8/16/2019  6:38 PM Jamaal Tipton Add [I10] Hypertension     8/16/2019  6:38 PM Jamaal Tipton Modify [I10] Hypertension     8/16/2019  6:39 PM Jamaal Tipton Modify [I10] Hypertension     8/16/2019  6:39 PM Jamaal Tipton Modify [I10] Hypertension     8/16/2019  6:39 PM DinDemetrius ruanok Add [K21 9] GERD (gastroesophageal reflux disease)     8/16/2019  6:39 PM Jamaal Tipton Modify [I10] Hypertension     8/16/2019  6:39 PM Jamaal Tipton Modify [I10] Hypertension     8/16/2019  6:39 PM Jamaal Tipton Modify [I10] Hypertension     8/16/2019  6:58 PM Eual Rebel Add [R25 1] Tremor     8/16/2019  6:58 PM Bobby Sullivaninick Modify [R25 1] Tremor     8/16/2019  7:01 PM DinDemetrius ruanok Add [Z76 0] Medication refill     8/16/2019  7:07 PM DinapolBobby canoJamaal Add [Z76 5] Drug-seeking behavior       ED Disposition     ED Disposition Condition Date/Time Comment    Discharge Stable Fri Aug 16, 2019  6:32 PM Samantha Rodriguez discharge to home/self care              Follow-up Information     Follow up With Specialties Details Why Contact Info Additional Ishmael Brewer 17, DO Orthopedic Surgery Schedule an appointment as soon as possible for a visit   [address]  [place] [facility] Pain Medicine Schedule an appointment as soon as possible for a visit   [place] [address]  [address], [address],  [address], [facility], [place], [address]    [address] Schedule an appointment as soon as possible for a visit   [address] [place] [address]  [phone]             Discharge Medication List as of 8/16/2019  7:02 PM      CONTINUE these medications which have CHANGED    Details   amLODIPine (NORVASC) 10 mg tablet Take 1 tablet (10 mg total) by mouth daily, Starting Fri 8/16/2019, Print      aspirin 81 MG tablet Take 1 tablet (81 mg total) by mouth daily, Starting Fri 8/16/2019, Normal         CONTINUE these medications which have NOT CHANGED    Details   acetaminophen-codeine (TYLENOL #3) 300-30 mg per tablet Take 1 tablet by mouth every 6 (six) hours as needed for moderate pain, Starting Wed 3/21/2018, Print      Cholecalciferol (VITAMIN D3) 1000 units CAPS Take 1 capsule (1,000 Units total) by mouth daily, Starting Tue 3/13/2018, Normal      gabapentin (NEURONTIN) 300 mg capsule To be added to 600mg capsule for a total of 900mg qhs, Normal      gabapentin (NEURONTIN) 600 MG tablet Take 1 PO HS, Normal      LORazepam (ATIVAN) 2 mg tablet Take 1 tablet (2 mg total) by mouth 3 (three) times a day, Starting Thu 3/22/2018, Print      morphine (MS CONTIN) 15 mg 12 hr tablet Take 1 tablet (15 mg total) by mouth 2 (two) times a day Max Daily Amount: 30 mg, Starting Wed 3/21/2018, Print      primidone (MYSOLINE) 250 mg tablet Take 1 tablet (250 mg total) by mouth daily at bedtime, Starting Tue 3/13/2018, Normal      propranolol (INDERAL) 20 mg tablet Take 1 tablet (20 mg total) by mouth 2 (two) times a day, Starting Tue 5/22/2018, Normal      risperiDONE (RisperDAL) 2 mg tablet Take 1 tablet (2 mg total) by mouth daily at bedtime, Starting Thu 3/15/2018, Print      meloxicam (MOBIC) 7 5 mg tablet Take 1 tablet (7 5 mg total) by mouth 2 (two) times a day as needed for mild pain, Starting Wed 3/21/2018, Normal      omeprazole (PriLOSEC) 20 mg delayed release capsule Take 1 capsule (20 mg total) by mouth daily, Starting Tue 3/13/2018, Normal      solifenacin (VESICARE) 10 MG tablet Take 1 tablet (10 mg total) by mouth daily, Starting Tue 3/13/2018, Normal      tiZANidine (ZANAFLEX) 2 mg tablet Take 1 tablet (2 mg total) by mouth every 8 (eight) hours as needed for muscle spasms, Starting Wed 3/21/2018, Normal      traZODone (DESYREL) 150 mg tablet TAKE 1 TABLET EVERY DAY AT BEDTIME, Normal      venlafaxine (EFFEXOR) 25 mg tablet TAKE 1 TABLET 3 TIMES A DAY, Normal           No discharge procedures on file      ED Provider  Electronically Signed by           Ivis Higuera PA-C  08/16/19 1200 [facility]  08/19/19 202 [address]  08/19/19 3697

## 2019-08-23 ENCOUNTER — PATIENT OUTREACH (OUTPATIENT)
Dept: INTERNAL MEDICINE CLINIC | Facility: CLINIC | Age: 54
End: 2019-08-23

## 2019-08-23 ENCOUNTER — OFFICE VISIT (OUTPATIENT)
Dept: INTERNAL MEDICINE CLINIC | Facility: CLINIC | Age: 54
End: 2019-08-23

## 2019-08-23 VITALS
WEIGHT: 166.23 LBS | HEART RATE: 84 BPM | DIASTOLIC BLOOD PRESSURE: 100 MMHG | BODY MASS INDEX: 31.41 KG/M2 | TEMPERATURE: 97.4 F | SYSTOLIC BLOOD PRESSURE: 168 MMHG

## 2019-08-23 DIAGNOSIS — N39.41 URGE INCONTINENCE OF URINE: ICD-10-CM

## 2019-08-23 DIAGNOSIS — F41.1 GENERALIZED ANXIETY DISORDER: Primary | ICD-10-CM

## 2019-08-23 DIAGNOSIS — Z71.89 COMPLEX CARE COORDINATION: Primary | ICD-10-CM

## 2019-08-23 DIAGNOSIS — F11.20 UNCOMPLICATED OPIOID DEPENDENCE (HCC): ICD-10-CM

## 2019-08-23 DIAGNOSIS — M54.16 LUMBAR RADICULOPATHY: ICD-10-CM

## 2019-08-23 DIAGNOSIS — F33.1 MAJOR DEPRESSIVE DISORDER, RECURRENT EPISODE, MODERATE DEGREE (HCC): ICD-10-CM

## 2019-08-23 DIAGNOSIS — N32.81 OVERACTIVE BLADDER: ICD-10-CM

## 2019-08-23 DIAGNOSIS — M51.16 LUMBAR DISC DISEASE WITH RADICULOPATHY: ICD-10-CM

## 2019-08-23 DIAGNOSIS — Z02.83 ENCOUNTER FOR DRUG SCREENING: Primary | ICD-10-CM

## 2019-08-23 DIAGNOSIS — Z78.9 NEEDS ASSISTANCE WITH COMMUNITY RESOURCES: Primary | ICD-10-CM

## 2019-08-23 DIAGNOSIS — F41.9 ANXIETY: ICD-10-CM

## 2019-08-23 DIAGNOSIS — F41.9 ANXIETY: Primary | ICD-10-CM

## 2019-08-23 DIAGNOSIS — M79.7 FIBROMYALGIA: ICD-10-CM

## 2019-08-23 DIAGNOSIS — I10 HYPERTENSION: ICD-10-CM

## 2019-08-23 DIAGNOSIS — R26.2 AMBULATORY DYSFUNCTION: ICD-10-CM

## 2019-08-23 DIAGNOSIS — G89.4 CHRONIC PAIN DISORDER: ICD-10-CM

## 2019-08-23 DIAGNOSIS — G24.01 TARDIVE DYSKINESIA: ICD-10-CM

## 2019-08-23 DIAGNOSIS — M17.10 OSTEOARTHRITIS OF KNEE, UNSPECIFIED LATERALITY, UNSPECIFIED OSTEOARTHRITIS TYPE: ICD-10-CM

## 2019-08-23 DIAGNOSIS — F33.2 MDD (MAJOR DEPRESSIVE DISORDER), RECURRENT SEVERE, WITHOUT PSYCHOSIS (HCC): ICD-10-CM

## 2019-08-23 PROCEDURE — 80307 DRUG TEST PRSMV CHEM ANLYZR: CPT | Performed by: INTERNAL MEDICINE

## 2019-08-23 PROCEDURE — 99205 OFFICE O/P NEW HI 60 MIN: CPT | Performed by: INTERNAL MEDICINE

## 2019-08-23 RX ORDER — LORAZEPAM 0.5 MG/1
0.5 TABLET ORAL EVERY 8 HOURS PRN
Qty: 90 TABLET | Refills: 0 | Status: SHIPPED | OUTPATIENT
Start: 2019-08-23 | End: 2019-11-21

## 2019-08-23 RX ORDER — DULOXETIN HYDROCHLORIDE 60 MG/1
60 CAPSULE, DELAYED RELEASE ORAL DAILY
Qty: 90 CAPSULE | Refills: 0 | Status: SHIPPED | OUTPATIENT
Start: 2019-08-23 | End: 2019-09-12 | Stop reason: SDUPTHER

## 2019-08-23 RX ORDER — BUSPIRONE HYDROCHLORIDE 5 MG/1
5 TABLET ORAL 3 TIMES DAILY
Qty: 90 TABLET | Refills: 0 | Status: SHIPPED | OUTPATIENT
Start: 2019-08-23 | End: 2019-09-12 | Stop reason: SDUPTHER

## 2019-08-23 RX ORDER — QUETIAPINE 300 MG/1
300 TABLET, FILM COATED, EXTENDED RELEASE ORAL
Qty: 60 TABLET | Refills: 1 | Status: SHIPPED | OUTPATIENT
Start: 2019-08-23 | End: 2021-03-17 | Stop reason: ALTCHOICE

## 2019-08-23 RX ORDER — TAMSULOSIN HYDROCHLORIDE 0.4 MG/1
0.4 CAPSULE ORAL
Qty: 60 CAPSULE | Refills: 1 | Status: SHIPPED | OUTPATIENT
Start: 2019-08-23 | End: 2020-01-15

## 2019-08-23 RX ORDER — POTASSIUM CHLORIDE 20 MEQ/1
20 TABLET, EXTENDED RELEASE ORAL 2 TIMES DAILY
Qty: 30 TABLET | Refills: 0 | Status: SHIPPED | OUTPATIENT
Start: 2019-08-23 | End: 2019-09-12 | Stop reason: SDUPTHER

## 2019-08-23 RX ORDER — OXYBUTYNIN CHLORIDE 10 MG/1
10 TABLET, EXTENDED RELEASE ORAL
Qty: 90 TABLET | Refills: 0 | Status: SHIPPED | OUTPATIENT
Start: 2019-08-23 | End: 2019-11-14 | Stop reason: SDUPTHER

## 2019-08-23 RX ORDER — MORPHINE SULFATE 15 MG/1
15 TABLET, FILM COATED, EXTENDED RELEASE ORAL 2 TIMES DAILY
Qty: 60 TABLET | Refills: 0 | Status: SHIPPED | OUTPATIENT
Start: 2019-08-23 | End: 2019-11-25 | Stop reason: SDUPTHER

## 2019-08-23 RX ORDER — GABAPENTIN 600 MG/1
600 TABLET ORAL 3 TIMES DAILY
Qty: 90 TABLET | Refills: 0 | Status: SHIPPED | OUTPATIENT
Start: 2019-08-23 | End: 2019-11-21

## 2019-08-23 RX ORDER — AMLODIPINE BESYLATE 5 MG/1
5 TABLET ORAL DAILY
Qty: 60 TABLET | Refills: 1 | Status: SHIPPED | OUTPATIENT
Start: 2019-08-23 | End: 2019-08-29 | Stop reason: SDUPTHER

## 2019-08-23 RX ORDER — CYCLOBENZAPRINE HCL 10 MG
10 TABLET ORAL DAILY
Qty: 90 TABLET | Refills: 0 | Status: SHIPPED | COMMUNITY
Start: 2019-08-23 | End: 2019-08-30 | Stop reason: SDUPTHER

## 2019-08-23 RX ORDER — HYDROMORPHONE HYDROCHLORIDE 4 MG/1
4 TABLET ORAL EVERY 6 HOURS PRN
Qty: 120 TABLET | Refills: 0 | Status: SHIPPED | OUTPATIENT
Start: 2019-08-23 | End: 2019-09-22

## 2019-08-23 NOTE — PROGRESS NOTES
ASSESSMENT/PLAN:  Problem List Items Addressed This Visit        Cardiovascular and Mediastinum    Hypertension     Blood pressure today at the office is slightly high  Pt currently has been taking her blood pressure medications  Likely high secondary due to pain from her back  · Propanolol 20 mg b i d  and amlopdipine 5 mg daily  Relevant Medications    amLODIPine (NORVASC) 5 mg tablet    potassium chloride (K-DUR,KLOR-CON) 20 mEq tablet       Nervous and Auditory    Lumbar radiculopathy    Relevant Medications    gabapentin (NEURONTIN) 600 MG tablet    morphine (MS CONTIN) 15 mg 12 hr tablet    Lumbar disc disease with radiculopathy     Patient has chronic pain secondary to this  She had an operation in [place] on November 2018 which she states she the had a rudy inserted into her back  Patient will sign medical consent to have the records sent to clinic  Pt used to follow up with pain management in [place] and used to obtain Dilaudid, and morphine for pain  She also Bilateral L4 Transforaminal Epidural Steroid Injections in the past    Will refer to orthopedic surgery   Will refer to pain management  Consult social work to help with transportation issues to get to appointment             Relevant Medications    cyclobenzaprine (FLEXERIL) 10 mg tablet    Other Relevant Orders    Ambulatory referral to Pain Management    Ambulatory referral to Orthopedic Surgery    Ambulatory referral to social work care management program       Musculoskeletal and Integument    Osteoarthritis of knee    Relevant Medications    lubiprostone (AMITIZA) 24 mcg capsule    Other Relevant Orders    Ambulatory referral to Orthopedic Surgery    Ambulatory referral to social work care management program       Genitourinary    Overactive bladder    Relevant Medications    tamsulosin (FLOMAX) 0 4 mg    oxybutynin (DITROPAN-XL) 10 MG 24 hr tablet       Other    Chronic pain disorder    Relevant Medications    morphine (MS CONTIN) 15 mg 12 hr tablet    Fibromyalgia     Continue duloxetine 60 mg for fibromyalgia  Anxiety    Generalized anxiety disorder - Primary     Patient has extensive psychiatric diagnoses including generalized anxiety disorder, major depression, PTSD  · Continue BuSpar 5 mg daily, Cymbalta 60 mg daily, lorazepam 0 5 mg t i d  P r n  · Continue Valbenazine to prevent EPS/tardive dyskinesia symptoms from anti-psychotic  Pt needs to re-establish with mental health  · BMP was checked and no abuse found  Relevant Medications    QUEtiapine (SEROquel XR) 300 mg 24 hr tablet    busPIRone (BUSPAR) 5 mg tablet    DULoxetine (CYMBALTA) 60 mg delayed release capsule    Valbenazine Tosylate (INGREZZA) 80 MG CAPS    LORazepam (ATIVAN) 0 5 mg tablet    Opioid dependence (HCC)     Patient chronic opioid secondary to chronic lumbar pain with stenosis as well as scoliosis  Patient had extensive back surgery in for a which she will obtain medical records from  She is currently on Dilaudid 4 mg Q 6 hours p r n  As well as morphine 15 bid  · Patient signed opioid contract at this time  · Patient gave urinary sample today  · PDMP was checked (verified pt was receiving these medications, no suspicious activity)  · Dilaudid 4 mg Q 6 p r n  For 30 days supply and Morphine 15 mg b i d for 30 days was sent to pharmacy  · Continue amitiza for opiod induced constipation  Relevant Medications    HYDROmorphone (DILAUDID) 4 mg tablet    Urinary incontinence     Patient experiencing symptoms of urinary incontinence, likely overflow and urge incontinence  She denies any stress incontinence  Patient has follow-up with Urology  She is currently prescribed tamsulosin and oxybutynin           Relevant Medications    tamsulosin (FLOMAX) 0 4 mg    oxybutynin (DITROPAN-XL) 10 MG 24 hr tablet      Other Visit Diagnoses     MDD (major depressive disorder), recurrent severe, without psychosis ([facility] [address] )        Relevant Medications    QUEtiapine (SEROquel XR) 300 mg 24 hr tablet    busPIRone (BUSPAR) 5 mg tablet    DULoxetine (CYMBALTA) 60 mg delayed release capsule    Valbenazine Tosylate (INGREZZA) 80 MG CAPS    LORazepam (ATIVAN) 0 5 mg tablet    Tardive dyskinesia        Relevant Medications    QUEtiapine (SEROquel XR) 300 mg 24 hr tablet    Valbenazine Tosylate (INGREZZA) 80 MG CAPS    cyclobenzaprine (FLEXERIL) 10 mg tablet    gabapentin (NEURONTIN) 600 MG tablet    Ambulatory dysfunction        Relevant Orders    Ambulatory referral to social work care management program              CHIEF COMPLAINT: Restablishing care, new patient     HISTORY OF PRESENT ILLNESS:  49-year-old female past medical history of chronic opiod dependence due to chronic bilateral low back pain secondary to sciatica, sacroiliitis, scoliosis, ambulatory dysfunction usually wheelchair bound, several psychiatric disorders, hypertension, previous history of stroke with chronic left-sided weakness and dysarthria presented for re-establishing of care after going back from [place]  Patient states she has had back surgery in her back last year in November in [place] with 3 rehabs in the last year  Patient states physical therapy does not work for her  Patient has been opiate dependent with Dilaudid and morphine  Patient states the morphine has does minimal to alleviate the pain  Patient has Dilaudid q 6 hours however she needs it every 5th hour  Patient was complaining of bilateral knee pain following a fall from falling off her wheelchair and is seeing Orthopedic surgery for this  Patient needs help with ambulation as well as ADLs  Patient also needs assistance with transportation given her ambulatory dysfunction  Patient states that she had a successful trial of spinal cord stimulator in [place], she was going to have it implanted but had to return to [place]    She would like to follow up with Neurosurgery for placement of spinal cord stimulator  Patient is seen currently is in severe pain secondary to her back surgery, she has bilateral knee pain secondary to this fall  Patient has been without any pain medication for approximately 4 days  Review of Systems   Constitutional: Negative for activity change, chills and fever  Eyes: Negative for visual disturbance  Respiratory: Negative for cough, chest tightness and shortness of breath  Cardiovascular: Negative for chest pain and leg swelling  Gastrointestinal: Negative for abdominal pain, constipation, diarrhea, nausea and vomiting  Endocrine: Negative for cold intolerance and heat intolerance  Genitourinary: Negative for difficulty urinating  Musculoskeletal: Positive for arthralgias, back pain, gait problem, joint swelling and myalgias  Skin: Negative for rash  Allergic/Immunologic: Negative  Neurological: Negative for dizziness, weakness and light-headedness  Hematological: Negative  Psychiatric/Behavioral: Negative  All other systems reviewed and are negative  OBJECTIVE:  Vitals:    08/23/19 0929   BP: 168/100   Pulse: 84   Temp: (!) 97 4 °F (36 3 °C)   Weight: 75 4 kg (166 lb 3 6 oz)     Physical Exam   Constitutional: She is oriented to person, place, and time  She appears well-developed and well-nourished  HENT:   Head: Normocephalic and atraumatic  Mouth/Throat: No oropharyngeal exudate  Eyes: Pupils are equal, round, and reactive to light  EOM are normal    Neck: Normal range of motion  Neck supple  Cardiovascular: Normal rate, regular rhythm, normal heart sounds and intact distal pulses  Pulmonary/Chest: Effort normal and breath sounds normal  No respiratory distress  Abdominal: Soft  Bowel sounds are normal  There is no tenderness  Musculoskeletal: She exhibits tenderness (bilateral knee) and deformity  She exhibits no edema  Lymphadenopathy:     She has no cervical adenopathy     Neurological: She is alert and oriented to person, place, and time  She exhibits abnormal muscle tone  Coordination abnormal    dysarthria   Skin: Skin is warm and dry  Psychiatric: She has a normal mood and affect  Vitals reviewed          Current Outpatient Medications:     amLODIPine (NORVASC) 5 mg tablet, Take 1 tablet (5 mg total) by mouth daily, Disp: 60 tablet, Rfl: 1    gabapentin (NEURONTIN) 600 MG tablet, Take 1 tablet (600 mg total) by mouth 3 (three) times a day, Disp: 90 tablet, Rfl: 0    LORazepam (ATIVAN) 0 5 mg tablet, Take 1 tablet (0 5 mg total) by mouth every 8 (eight) hours as needed for anxiety for up to 30 days, Disp: 90 tablet, Rfl: 0    morphine (MS CONTIN) 15 mg 12 hr tablet, Take 1 tablet (15 mg total) by mouth 2 (two) times a dayMax Daily Amount: 30 mg, Disp: 60 tablet, Rfl: 0    primidone (MYSOLINE) 250 mg tablet, Take 1 tablet (250 mg total) by mouth daily at bedtime, Disp: 90 tablet, Rfl: 1    propranolol (INDERAL) 20 mg tablet, Take 1 tablet (20 mg total) by mouth 2 (two) times a day, Disp: 120 tablet, Rfl: 6    aspirin 81 MG tablet, Take 1 tablet (81 mg total) by mouth daily, Disp: 30 tablet, Rfl: 0    busPIRone (BUSPAR) 5 mg tablet, Take 1 tablet (5 mg total) by mouth 3 (three) times a day, Disp: 90 tablet, Rfl: 0    Cholecalciferol (VITAMIN D3) 1000 units CAPS, Take 1 capsule (1,000 Units total) by mouth daily, Disp: 90 capsule, Rfl: 1    cyclobenzaprine (FLEXERIL) 10 mg tablet, Take 1 tablet (10 mg total) by mouth daily, Disp: 90 tablet, Rfl: 0    DULoxetine (CYMBALTA) 60 mg delayed release capsule, Take 1 capsule (60 mg total) by mouth daily, Disp: 90 capsule, Rfl: 0    HYDROmorphone (DILAUDID) 4 mg tablet, Take 1 tablet (4 mg total) by mouth every 6 (six) hours as needed for moderate pain for up to 30 daysMax Daily Amount: 16 mg, Disp: 120 tablet, Rfl: 0    lubiprostone (AMITIZA) 24 mcg capsule, Take 1 capsule (24 mcg total) by mouth 2 (two) times a day with meals, Disp: 30 capsule, Rfl: 3    oxybutynin (DITROPAN-XL) 10 MG 24 hr tablet, Take 1 tablet (10 mg total) by mouth daily at bedtime, Disp: 90 tablet, Rfl: 0    potassium chloride (K-DUR,KLOR-CON) 20 mEq tablet, Take 1 tablet (20 mEq total) by mouth 2 (two) times a day, Disp: 30 tablet, Rfl: 0    QUEtiapine (SEROquel XR) 300 mg 24 hr tablet, Take 1 tablet (300 mg total) by mouth daily at bedtime, Disp: 60 tablet, Rfl: 1    tamsulosin (FLOMAX) 0 4 mg, Take 1 capsule (0 4 mg total) by mouth daily with dinner, Disp: 60 capsule, Rfl: 1    traZODone (DESYREL) 150 mg tablet, TAKE 1 TABLET EVERY DAY AT BEDTIME (Patient not taking: Reported on 2019), Disp: 90 tablet, Rfl: 0    Valbenazine Tosylate (INGREZZA) 80 MG CAPS, Take 80 capsules by mouth every morning, Disp: 90 capsule, Rfl: 0    Past Medical History:   Diagnosis Date    Acid reflux     Anxiety     RESOLVED: [id number]    Arthritis     Bipolar 2 disorder (HCC)     FOLLOWS WITH PSYCHIATRIST  CONTINUE LAMOTRIGINE; RESOLVED: [id number]    Depression     Familial tremor     both hands    Fibromyalgia     LAST ASSESSED: [id number]    Hearing aid worn     left ear    [place] (hard of hearing)     left ear    Hypertension     Left-sided weakness     Lower back pain     Memory loss of unknown cause     long and short term    Migraine     Overactive bladder     Panic attack     Post traumatic stress disorder     Seasonal allergies     Stroke [facility])     questionable stroke     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    CONT ASA 81 MG DAILY; RESOLVED: [id number]    Urinary incontinence     Wears dentures     partial lower / full upper    Wears glasses      Past Surgical History:   Procedure Laterality Date     SECTION      COLONOSCOPY      RESOLVED: [id number]    EAR SURGERY      HYSTERECTOMY      MYRINGOTOMY W/ TUBES Left     [place] CYSTOURETHROSCOPY N/A 2016    Procedure: CYSTOSCOPY, BOTOX INJECTION;  Surgeon: Damian Wiseman MD;  Location: [facility];  Service: Gynecology    TONSILLECTOMY      TUBAL LIGATION  1986     Social History     Socioeconomic History    Marital status:      Spouse name: Not on file    Number of children: 2    Years of education: graduate school     Highest education level: Not on file   Occupational History    Occupation: Disabled   Social Needs    Financial resource strain: Somewhat hard    Food insecurity:     Worry: Not on file     Inability: Not on file   Spectraseis needs:     Medical: Not on file     Non-medical: Not on file   Tobacco Use    Smoking status: Never Smoker    Smokeless tobacco: Never Used   Substance and Sexual Activity    Alcohol use: No     Comment: 2 x year; being a social drinker as per all scripts     Drug use: No    Sexual activity: Not on file   Lifestyle    Physical activity:     Days per week: Not on file     Minutes per session: Not on file    Stress: Not on file   Relationships    Social connections:     Talks on phone: Not on file     Gets together: Not on file     Attends Nondenominational service: Not on file     Active member of club or organization: Not on file     Attends meetings of clubs or organizations: Not on file     Relationship status: Not on file    Intimate partner violence:     Fear of current or ex partner: Not on file     Emotionally abused: Not on file     Physically abused: Not on file     Forced sexual activity: Not on file   Other Topics Concern    Not on file   Social History Narrative    Bereavement    Daily caffeine consumption, 6-8 servings per day     as per all scripts    Lives in [place]      Family History   Problem Relation Age of Onset    Colon cancer Mother     Alzheimer's disease Father     Stroke Father     Colon cancer Brother     Bipolar disorder Brother     Breast cancer Maternal Aunt     Heart disease Other     Diabetes Other     Hypertension Other     Seizures Son        ASHLEY Beard    [facility] Internal Medicine PGY-3  [address] [facility] [address] [facility]  [address]  [address]  [place], [address] [facility]

## 2019-08-23 NOTE — ASSESSMENT & PLAN NOTE
Patient experiencing symptoms of urinary incontinence, likely overflow and urge incontinence  She denies any stress incontinence  Patient has follow-up with Urology  She is currently prescribed tamsulosin and oxybutynin

## 2019-08-23 NOTE — ASSESSMENT & PLAN NOTE
Blood pressure today at the office is slightly high  Pt currently has been taking her blood pressure medications  Likely high secondary due to pain from her back  · Propanolol 20 mg b i d  and amlopdipine 5 mg daily

## 2019-08-23 NOTE — ASSESSMENT & PLAN NOTE
Patient chronic opioid secondary to chronic lumbar pain with stenosis as well as scoliosis  Patient had extensive back surgery in for a which she will obtain medical records from  She is currently on Dilaudid 4 mg Q 6 hours p r n  As well as morphine 15 bid  · Patient signed opioid contract at this time  · Patient gave urinary sample today  · PDMP was checked (verified pt was receiving these medications, no suspicious activity)  · Dilaudid 4 mg Q 6 p r n  For 30 days supply and Morphine 15 mg b i d for 30 days was sent to pharmacy  · Continue amitiza for opiod induced constipation

## 2019-08-23 NOTE — PROGRESS NOTES
Faxed med list and demographics to [facility]  I then called them and spoke with Jes Barrios and made her aware patient is agreeable to using their pharmacy and getting set up with bubble packing and delivery  I did make her aware patient will need someone to go out to her to get medication that she currently has  I gave them her [facility] address of where she is currently living  I did make them aware most prescriptions were sent to [facility] on [address] in [place] phone # [phone] and that patient was picking up dilaudid, morphine, and ativan from there today and will have them in the home and only given a 30 day supply until her next follow up appointment at PCP office  Pharmacy has my contact # if needed and main office # and is aware there is a prompt for medication line

## 2019-08-23 NOTE — ASSESSMENT & PLAN NOTE
Patient has chronic pain secondary to this  She had an operation in [place] on November 2018 which she states she the had a rudy inserted into her back  Patient will sign medical consent to have the records sent to clinic  Pt used to follow up with pain management in [place] and used to obtain Dilaudid, and morphine for pain  She also Bilateral L4 Transforaminal Epidural Steroid Injections in the past    Will refer to orthopedic surgery   Will refer to pain management  Consult social work to help with transportation issues to get to appointment

## 2019-08-23 NOTE — ASSESSMENT & PLAN NOTE
Patient has extensive psychiatric diagnoses including generalized anxiety disorder, major depression, PTSD  · Continue BuSpar 5 mg daily, Cymbalta 60 mg daily, lorazepam 0 5 mg t i d  P r n  · Continue Valbenazine to prevent EPS/tardive dyskinesia symptoms from anti-psychotic  Pt needs to re-establish with mental health  · BMP was checked and no abuse found

## 2019-08-23 NOTE — PROGRESS NOTES
TRACY and Soraida Montana RN/CM has met with this 53 y/o  female pt this date who came s/p ED visit and to establish care here  Pt is s/p a CVA, is wheel chair bound  Has low back pain with bilateral sciatica, knee pain, hip pain, anxiety , bipolar, HTN, Panic Disorder, etc      Pt had been in [place] and has moved back to the [facility] and is temporarily staying with her dgt who is expecting an has 2 children  She relates she needs to leave very soon as her dgt is in HOUSING  Her other plan was to move in with her son in [place] but that room is not available and she relates it is very small  She would much prefer to get an efficiency if possible  Pt relates she also has transportation issues  Her  took off work to help her get to PCP today but she does not have rides for future appointments  SW did have pt complete a [employer] Form as well a [employer] Waiver Release but HIRAM unable to coordinate ride for this Brent Appiah  Pt is aware and will attempt to get a ride or will reschedule it  Pt would like to reestablish with Elise GANDHI/HUNTER provided pt with a new  application but she ws too weak and in too much pain for SW to help her compelte it today  Pt offered the help of our [facility] who she met and pt agreeable to this referral but needs get dgt's permision prior to any home visit  Our  will attempt to help pt with other housing resources  HIRAM has provided pt with contact info for [employer]  HIRAM also inquired if pt would like to resume OP MH services  Pt relats he ws followed by [facility] before is receptive to referral to them again    Hiram has called [address] with request for them to call pt as per her request

## 2019-08-23 NOTE — PROGRESS NOTES
Thanks for the update  I'll forward this to the new PCP who's going to be taking over her care from now on   Thank you

## 2019-08-23 NOTE — PROGRESS NOTES
Outpatient Care Management Note:    Patient referred to outpatient nurse care management by Dr Ines Cazares at primary care doctor's office  I met with patient along with our  Song Clark and later on our community health worker Sera Raymundo came to meet patient  Patient is at the office today to reestablish care  Patient moved back to [place] on 19 after being in [place] for over a year  Patient reports in [place] she was in and out of the hospital for back surgery and then was released from a rehab facility before coming back to [place]  Patient is currently living with her daughter  Trinity Soares (phone # [phone]) in [facility] address [address] [place] [address]  She reports daughter has 2 children and another on the way and is unable to stay with her and needs to be out come beginning of September  She reports she is suppose to stay with her son who has an apartment in [place] but room he is rented they are not moving out it time and some repairs need to be made before she moves in  Patient was given some resources such as [employer] from our  and made aware [employer] is open to applications for their waitlist      Patient also needs help with transportation as she had [employer] [address] in the past but  May of 2018 and needs to reapply  Patient unable to stay today to complete application and may have community health worker go out to help patient complete application   will see if patient can get approved for [employer] in the meantime  Patient reports her ex  brought her here today and will be taking her home but had to call off of work  Patient is wheelchair bound and reports can walk short distances  Patient has wheelchair and standard walker in the home  Patient reports she has had falls since being back in [place] and reports one fall she fell right on her knees and has been having knee pain   Patient also reports she was suppose to be getting a spinal cord stimulator when she was in [place] but will be having further follow up by Kiran Mahan on 8/26 and [facility] on 9/12  Patient is also agreeable at this time to reestablish care with [address] [place] [address] where she has gone to in the past      Patient has medication in packages from her rehab facility that she was at in [place]  Patient agreeable to getting setup with a pharmacy that offers bubble packing and medication delivery  Patient agreeable to using [facility] in [facility] [place] phone # [phone]  Spoke with [place] at [facility] that handles the bubble packing  She is requesting demographics and med list be faxed to them at [phone] and they will go out to get patient's current medication and start bubble packing  Per Dr Delilah Jeans medication list was updated today  They do deliver to  [place] if patient would move  Patient is aware of this and I gave her pharmacy name, address, and phone #  Patient will go today to [facility] in [place] to  her morphine, ativan, and dilaudid  Patient was requesting further clarification after her visit if PCP office will continue to prescribe these medications as pain management does not prescribe them  I spoke with Dr Dell Westfall (attending physician) who reports PCP office will continue to prescribe the Ativan and Morphine but will work on tapering her off the dilaudid which will be further discussed at upcoming appointment in about 1 month  I made patient aware and she did not have further questions or concerns  Patient currently has Medicare and has medical assistance pending in [place]  Patient reports she receives SSI and was getting $800 a month but now only $500 since she was in a facility in [place] but reports should change since she in no longer in a facility  Patient does not have any further questions, concerns, or other needs at this time   Pt has my contact # [phone] and PCP office #  [phone] if needed  Pt is agreeable for further outreach   I did give patient my card with my contact information and that I am available M-F 8 am - 4:30 pm

## 2019-08-26 ENCOUNTER — PATIENT OUTREACH (OUTPATIENT)
Dept: INTERNAL MEDICINE CLINIC | Facility: CLINIC | Age: 54
End: 2019-08-26

## 2019-08-26 NOTE — PROGRESS NOTES
SW received call from pt requesting rides to/from with to Dr Jaleesa Resendez who is at [facility] [address] [facility] [address] [address]  She had to canceled planned appointment as she could not get a ride  She has rescheduled and needs to be there Wednesday 8/28/19 at 1:45pm for a 2 PM appointment  SW was able to get thi scheduled with [employer] and SW corrected her with them  Her current  ( tough temporary )  address is [address] [place] [address] [employer] [address] [facility] cellphone [phone]   [facility]  Patients uses a wheelchair but related she will have someone help her get up and down the few steps she has at her house  They will assist her at  and when she returns  SW received confirmation from Dee Marshall at [employer] and HIRAM left message for pt re same

## 2019-08-28 ENCOUNTER — APPOINTMENT (OUTPATIENT)
Dept: RADIOLOGY | Facility: AMBULARY SURGERY CENTER | Age: 54
End: 2019-08-28
Payer: MEDICARE

## 2019-08-28 ENCOUNTER — OFFICE VISIT (OUTPATIENT)
Dept: OBGYN CLINIC | Facility: CLINIC | Age: 54
End: 2019-08-28
Payer: MEDICARE

## 2019-08-28 ENCOUNTER — PATIENT OUTREACH (OUTPATIENT)
Dept: INTERNAL MEDICINE CLINIC | Facility: CLINIC | Age: 54
End: 2019-08-28

## 2019-08-28 VITALS
SYSTOLIC BLOOD PRESSURE: 144 MMHG | HEIGHT: 61 IN | DIASTOLIC BLOOD PRESSURE: 98 MMHG | BODY MASS INDEX: 31.34 KG/M2 | HEART RATE: 76 BPM | WEIGHT: 166 LBS

## 2019-08-28 DIAGNOSIS — M51.16 LUMBAR DISC DISEASE WITH RADICULOPATHY: ICD-10-CM

## 2019-08-28 DIAGNOSIS — M17.10 OSTEOARTHRITIS OF KNEE, UNSPECIFIED LATERALITY, UNSPECIFIED OSTEOARTHRITIS TYPE: ICD-10-CM

## 2019-08-28 DIAGNOSIS — M25.561 PAIN IN BOTH KNEES, UNSPECIFIED CHRONICITY: ICD-10-CM

## 2019-08-28 DIAGNOSIS — M25.562 PAIN IN BOTH KNEES, UNSPECIFIED CHRONICITY: ICD-10-CM

## 2019-08-28 DIAGNOSIS — M25.561 PAIN IN BOTH KNEES, UNSPECIFIED CHRONICITY: Primary | ICD-10-CM

## 2019-08-28 DIAGNOSIS — M25.562 PAIN IN BOTH KNEES, UNSPECIFIED CHRONICITY: Primary | ICD-10-CM

## 2019-08-28 PROCEDURE — 20610 DRAIN/INJ JOINT/BURSA W/O US: CPT | Performed by: ORTHOPAEDIC SURGERY

## 2019-08-28 PROCEDURE — 73562 X-RAY EXAM OF KNEE 3: CPT

## 2019-08-28 PROCEDURE — 99213 OFFICE O/P EST LOW 20 MIN: CPT | Performed by: ORTHOPAEDIC SURGERY

## 2019-08-28 RX ORDER — LIDOCAINE HYDROCHLORIDE 10 MG/ML
1 INJECTION, SOLUTION INFILTRATION; PERINEURAL
Status: COMPLETED | OUTPATIENT
Start: 2019-08-28 | End: 2019-08-28

## 2019-08-28 RX ORDER — BETAMETHASONE SODIUM PHOSPHATE AND BETAMETHASONE ACETATE 3; 3 MG/ML; MG/ML
6 INJECTION, SUSPENSION INTRA-ARTICULAR; INTRALESIONAL; INTRAMUSCULAR; SOFT TISSUE
Status: COMPLETED | OUTPATIENT
Start: 2019-08-28 | End: 2019-08-28

## 2019-08-28 RX ORDER — BUPIVACAINE HYDROCHLORIDE 2.5 MG/ML
1 INJECTION, SOLUTION INFILTRATION; PERINEURAL
Status: COMPLETED | OUTPATIENT
Start: 2019-08-28 | End: 2019-08-28

## 2019-08-28 RX ADMIN — LIDOCAINE HYDROCHLORIDE 1 ML: 10 INJECTION, SOLUTION INFILTRATION; PERINEURAL at 14:55

## 2019-08-28 RX ADMIN — BETAMETHASONE SODIUM PHOSPHATE AND BETAMETHASONE ACETATE 6 MG: 3; 3 INJECTION, SUSPENSION INTRA-ARTICULAR; INTRALESIONAL; INTRAMUSCULAR; SOFT TISSUE at 14:55

## 2019-08-28 RX ADMIN — BUPIVACAINE HYDROCHLORIDE 1 ML: 2.5 INJECTION, SOLUTION INFILTRATION; PERINEURAL at 14:55

## 2019-08-28 NOTE — PROGRESS NOTES
Assessment:  1  Pain in both knees, unspecified chronicity  XR knee 3 vw left non injury    XR knee 3 vw right non injury   2  Osteoarthritis of knee, unspecified laterality, unspecified osteoarthritis type  Ambulatory referral to Orthopedic Surgery   3  Lumbar disc disease with radiculopathy  Ambulatory referral to Orthopedic Surgery     Patient Active Problem List   Diagnosis    Chronic bilateral low back pain with bilateral sciatica    Right knee pain    Chronic pain disorder    Lumbar radiculopathy    Lumbar spondylosis    Fibromyalgia    Lumbar disc disease with radiculopathy    Spinal stenosis of lumbar region with neurogenic claudication    Anxiety    Pain in joint, shoulder region    Bilateral hip pain    Bipolar II disorder ([facility] )    Cognitive disorder    Esophageal reflux    Fatigue    Female pelvic pain    Generalized anxiety disorder    Hypertension    Hypokalemia    Insomnia    Major depressive disorder, recurrent episode, moderate degree (HCC)    Migraine    Opioid dependence ([facility] [address] )    Osteoarthritis of both hips    Osteoarthritis of knee    Overactive bladder    Pain, joint, hip    Panic disorder without agoraphobia    Post traumatic stress disorder    Primary localized osteoarthritis of both knees    Recent unexplained weight loss    Sacroiliitis (HCC)    Tremor    Urinary incontinence    Vitamin D deficiency           Plan        Cortisone injection today  I will order Visco supplements   I will also order  brace more specifically for the right 1 hopefully we can get that approved for her insurance  She will follow up with us once we have the Visco supplements approved  Patient has appointment with spine and pain for her back  I hope that they can also help to address her knee pain she is not going to be a candidate for knee replacement I will do what I can to help her pain with injections lubricant and bracing    But ultimately she me need long-term pain management  Patient also shows signs of what may be symptoms of tardive dyskinesia she needs to follow-up with a psychiatrist with regards to her medications  We had this discussion she has not made an appointment yet but she will be make an appointment shortly  She has a referral already  Subjective:     Patient ID:    Chief Complaint:Samantha Brar [place] 52 y o  female      HPI    Patient had multiple spinal surgeries since last year  Currently comes in for bilateral knee pain she had a fall on the 8th of Augus t  Since that fall she has had pain in both knees but also in the back of her leg and her back  And buttock  Patient reports that her pain can be as high as 9 out 10 and both knees  Patient has not tried anything for her knee at this point  Weight-bearing increases her pain        The following portions of the patient's history were reviewed and updated as appropriate: allergies, current medications, past family history, past social history, past surgical history and problem list     All organ systems normal    Social History     Socioeconomic History    Marital status:      Spouse name: Not on file    Number of children: 2    Years of education: graduate school     Highest education level: Not on file   Occupational History    Occupation: Disabled   Social Needs    Financial resource strain: Somewhat hard    Food insecurity:     Worry: Not on file     Inability: Not on file    Transportation needs:     Medical: Not on file     Non-medical: Not on file   Tobacco Use    Smoking status: Never Smoker    Smokeless tobacco: Never Used   Substance and Sexual Activity    Alcohol use: No     Comment: 2 x year; being a social drinker as per all scripts     Drug use: No    Sexual activity: Not on file   Lifestyle    Physical activity:     Days per week: Not on file     Minutes per session: Not on file    Stress: Not on file   Relationships    Social connections:     Talks on phone: Not on file     Gets together: Not on file     Attends Yarsanism service: Not on file     Active member of club or organization: Not on file     Attends meetings of clubs or organizations: Not on file     Relationship status: Not on file    Intimate partner violence:     Fear of current or ex partner: Not on file     Emotionally abused: Not on file     Physically abused: Not on file     Forced sexual activity: Not on file   Other Topics Concern    Not on file   Social History Narrative    Bereavement    Daily caffeine consumption, 6-8 servings per day     as per all scripts    Lives in [place]      Past Medical History:   Diagnosis Date    Acid reflux     Anxiety     RESOLVED: [id number]    Arthritis     Bipolar 2 disorder ([facility] [address] )     [address]  CONTINUE LAMOTRIGINE; RESOLVED: [id number]    Depression     Familial tremor     both hands    Fibromyalgia     LAST ASSESSED:     Hearing aid worn     left ear    [place] (hard of hearing)     left ear    Hypertension     Left-sided weakness     Lower back pain     Memory loss of unknown cause     long and short term    Migraine     Overactive bladder     Panic attack     Post traumatic stress disorder     Seasonal allergies     Stroke [facility])     questionable stroke 2009    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    CONT ASA 81 MG DAILY; RESOLVED: [id number]    Urinary incontinence     Wears dentures     partial lower / full upper    Wears glasses      Past Surgical History:   Procedure Laterality Date     SECTION      COLONOSCOPY      RESOLVED: [id number]    EAR SURGERY      HYSTERECTOMY  2004    MYRINGOTOMY W/ TUBES Left     MS CYSTOURETHROSCOPY N/A 2016    Procedure: CYSTOSCOPY, BOTOX INJECTION;  Surgeon: Monica Espinoza MD;  [facility];  Service: Gynecology    TONSILLECTOMY      TUBAL LIGATION       No Known Allergies  Current Outpatient Medications on File Prior to Visit   Medication Sig Dispense Refill    amLODIPine (NORVASC) 5 mg tablet Take 1 tablet (5 mg total) by mouth daily 60 tablet 1    aspirin 81 MG tablet Take 1 tablet (81 mg total) by mouth daily 30 tablet 0    busPIRone (BUSPAR) 5 mg tablet Take 1 tablet (5 mg total) by mouth 3 (three) times a day 90 tablet 0    Cholecalciferol (VITAMIN D3) 1000 units CAPS Take 1 capsule (1,000 Units total) by mouth daily 90 capsule 1    cyclobenzaprine (FLEXERIL) 10 mg tablet Take 1 tablet (10 mg total) by mouth daily 90 tablet 0    DULoxetine (CYMBALTA) 60 mg delayed release capsule Take 1 capsule (60 mg total) by mouth daily 90 capsule 0    gabapentin (NEURONTIN) 600 MG tablet Take 1 tablet (600 mg total) by mouth 3 (three) times a day 90 tablet 0    HYDROmorphone (DILAUDID) 4 mg tablet Take 1 tablet (4 mg total) by mouth every 6 (six) hours as needed for moderate pain for up to 30 daysMax Daily Amount: 16 mg 120 tablet 0    LORazepam (ATIVAN) 0 5 mg tablet Take 1 tablet (0 5 mg total) by mouth every 8 (eight) hours as needed for anxiety for up to 30 days 90 tablet 0    lubiprostone (AMITIZA) 24 mcg capsule Take 1 capsule (24 mcg total) by mouth 2 (two) times a day with meals 30 capsule 3    morphine (MS CONTIN) 15 mg 12 hr tablet Take 1 tablet (15 mg total) by mouth 2 (two) times a dayMax Daily Amount: 30 mg 60 tablet 0    oxybutynin (DITROPAN-XL) 10 MG 24 hr tablet Take 1 tablet (10 mg total) by mouth daily at bedtime 90 tablet 0    potassium chloride (K-DUR,KLOR-CON) 20 mEq tablet Take 1 tablet (20 mEq total) by mouth 2 (two) times a day 30 tablet 0    primidone (MYSOLINE) 250 mg tablet Take 1 tablet (250 mg total) by mouth daily at bedtime 90 tablet 1    propranolol (INDERAL) 20 mg tablet Take 1 tablet (20 mg total) by mouth 2 (two) times a day 120 tablet 6    QUEtiapine (SEROquel XR) 300 mg 24 hr tablet Take 1 tablet (300 mg total) by mouth daily at bedtime 60 tablet 1    tamsulosin (FLOMAX) 0 4 mg Take 1 capsule (0 4 mg total) by mouth daily with dinner 60 capsule 1    traZODone (DESYREL) 150 mg tablet TAKE 1 TABLET EVERY DAY AT BEDTIME (Patient not taking: Reported on 8/16/2019) 90 tablet 0    Valbenazine Tosylate (INGREZZA) 80 MG CAPS Take 80 capsules by mouth every morning 90 capsule 0     No current facility-administered medications on file prior to visit  Objective:    Large joint arthrocentesis: R knee  Date/Time: 8/28/2019 2:55 PM  Consent given by: patient  Supporting Documentation  Indications: pain   Procedure Details  Location: knee - R knee  Needle size: 22 G  Ultrasound guidance: no  Approach: anterolateral  Medications administered: 1 mL lidocaine 1 %; 6 mg betamethasone acetate-betamethasone sodium phosphate 6 (3-3) mg/mL; 1 mL bupivacaine 0 25 %      Large joint arthrocentesis: L knee  Date/Time: 8/28/2019 2:55 PM  Consent given by: patient  Timeout: Immediately prior to procedure a time out was called to verify the correct patient, procedure, equipment, support staff and site/side marked as required   Supporting Documentation  Indications: pain   Procedure Details  Location: knee - L knee  Needle size: 22 G  Ultrasound guidance: no  Approach: anterolateral  Medications administered: 1 mL lidocaine 1 %; 6 mg betamethasone acetate-betamethasone sodium phosphate 6 (3-3) mg/mL; 1 mL bupivacaine 0 25 %            Ortho Exam    Examination of both knee shows the right knee is swollen but there is no effusion the left knee has no swelling but both have valgus alignment more severe on the right knee than left knee there is crepitus with range of motion her strength is functional at 3+ out of 5  No erythema and no warmth with palpation  She is tender to palpation over the  Entirety  I have personally reviewed pertinent films in PACS and my interpretation is Severe arthritis involving the right knee lateral aspect and moderate arthritis left knee      Portions of the record may have been created with voice recognition software   Occasional wrong word or "sound a like" substitutions may have occurred due to the inherent limitations of voice recognition software   Read the chart carefully and recognize, using context, where substitutions have occurred

## 2019-08-28 NOTE — PROGRESS NOTES
Outpatient Care Management Note:    Received update from patient's new [address] who patient agreed to use for bubble packing and medication delivery  They were to get patient's medication on Monday 8/26 but they could not reach patient and the other day did not work for patient so they will be going out to get patient's medication tomorrow 8/29 @ 10 am in order to get her setup with a bubble pack

## 2019-08-29 ENCOUNTER — PATIENT OUTREACH (OUTPATIENT)
Dept: INTERNAL MEDICINE CLINIC | Facility: CLINIC | Age: 54
End: 2019-08-29

## 2019-08-29 DIAGNOSIS — I10 HYPERTENSION: ICD-10-CM

## 2019-08-29 DIAGNOSIS — G24.01 TARDIVE DYSKINESIA: ICD-10-CM

## 2019-08-29 DIAGNOSIS — E55.9 VITAMIN D DEFICIENCY: ICD-10-CM

## 2019-08-29 DIAGNOSIS — R25.1 TREMOR: ICD-10-CM

## 2019-08-29 RX ORDER — PROPRANOLOL HYDROCHLORIDE 20 MG/1
20 TABLET ORAL 2 TIMES DAILY
Qty: 120 TABLET | Refills: 0 | Status: SHIPPED | OUTPATIENT
Start: 2019-08-29 | End: 2020-03-23 | Stop reason: SDUPTHER

## 2019-08-29 RX ORDER — AMLODIPINE BESYLATE 5 MG/1
5 TABLET ORAL DAILY
Qty: 60 TABLET | Refills: 1 | Status: CANCELLED | OUTPATIENT
Start: 2019-08-29

## 2019-08-29 RX ORDER — BIOTIN 1 MG
1000 TABLET ORAL DAILY
Qty: 90 CAPSULE | Refills: 0 | Status: SHIPPED | OUTPATIENT
Start: 2019-08-29 | End: 2020-03-19 | Stop reason: HOSPADM

## 2019-08-29 RX ORDER — PRIMIDONE 250 MG/1
250 TABLET ORAL
Qty: 90 TABLET | Refills: 0 | Status: SHIPPED | OUTPATIENT
Start: 2019-08-29 | End: 2020-03-19 | Stop reason: HOSPADM

## 2019-08-29 RX ORDER — PROPRANOLOL HYDROCHLORIDE 20 MG/1
20 TABLET ORAL 2 TIMES DAILY
Qty: 120 TABLET | Refills: 6 | Status: CANCELLED | OUTPATIENT
Start: 2019-08-29

## 2019-08-29 RX ORDER — AMLODIPINE BESYLATE 5 MG/1
5 TABLET ORAL DAILY
Qty: 60 TABLET | Refills: 0 | Status: SHIPPED | OUTPATIENT
Start: 2019-08-29 | End: 2019-08-29 | Stop reason: SDUPTHER

## 2019-08-29 RX ORDER — ERGOCALCIFEROL (VITAMIN D2) 1250 MCG
CAPSULE ORAL
Refills: 0 | Status: CANCELLED | OUTPATIENT
Start: 2019-08-29

## 2019-08-29 RX ORDER — PRIMIDONE 250 MG/1
250 TABLET ORAL
Qty: 90 TABLET | Refills: 1 | Status: CANCELLED | OUTPATIENT
Start: 2019-08-29

## 2019-08-29 RX ORDER — BIOTIN 1 MG
1000 TABLET ORAL DAILY
Qty: 90 CAPSULE | Refills: 1 | Status: CANCELLED | OUTPATIENT
Start: 2019-08-29

## 2019-08-29 RX ORDER — AMLODIPINE BESYLATE 5 MG/1
5 TABLET ORAL DAILY
Qty: 60 TABLET | Refills: 0 | Status: SHIPPED | OUTPATIENT
Start: 2019-08-29 | End: 2019-09-23 | Stop reason: SDUPTHER

## 2019-08-29 NOTE — PROGRESS NOTES
Outpatient Care Management Note:    Received call from [facility] they are still working on getting patient's bubble pack completed  They need some prescriptions sent to them as medication appears on med list that was faxed to them but patient does not have the medication in the home  Please see refill request sent to Dr Vivian Navarro as she saw patient and reviewed meds with her  Pharmacy called later again and requested insurance information which I provided to them

## 2019-08-29 NOTE — TELEPHONE ENCOUNTER
I will refill patient's medications today, but I'm not patient's PCP and further communication needs to be addressed with new PCP  Patient will follow up with an intern either Dr Rochelle Marie or CRISTIANO Elizabeth per Dr Zonia Durán   Thanks Lorenza Little

## 2019-08-29 NOTE — TELEPHONE ENCOUNTER
Aspirin 81 mg was not sent to pharmacy please send  I did make pharmacy aware Valbenazine patient gets through a mail away pharmacy due to cost  Patient also requested to the pharmacy if heart burn medication could be prescribed

## 2019-08-29 NOTE — TELEPHONE ENCOUNTER
Received call from [facility]  Patient is now using this pharmacy as they offer bubble packing and delivery of medication  They are working on getting patient's bubble pack setup and medication list was faxed to them and patient does not have these medications at home and pharmacy does not have script on file  Please review and send medication that patient is to be taking       Amlodipine 5 mg   Aspirin 81 mg   Valbenazine Tosylate   Vit D3   Primidone  Propranolol

## 2019-08-30 DIAGNOSIS — K21.9 GASTROESOPHAGEAL REFLUX DISEASE WITHOUT ESOPHAGITIS: Primary | ICD-10-CM

## 2019-08-30 DIAGNOSIS — M51.16 LUMBAR DISC DISEASE WITH RADICULOPATHY: ICD-10-CM

## 2019-08-30 DIAGNOSIS — I10 HYPERTENSION: ICD-10-CM

## 2019-08-30 RX ORDER — OMEPRAZOLE 20 MG/1
20 CAPSULE, DELAYED RELEASE ORAL DAILY
Qty: 60 CAPSULE | Refills: 0 | Status: SHIPPED | OUTPATIENT
Start: 2019-08-30 | End: 2020-03-25 | Stop reason: SDUPTHER

## 2019-08-30 RX ORDER — CYCLOBENZAPRINE HCL 10 MG
10 TABLET ORAL DAILY
Qty: 90 TABLET | Refills: 0 | Status: SHIPPED | COMMUNITY
Start: 2019-08-30 | End: 2019-11-21

## 2019-08-30 NOTE — TELEPHONE ENCOUNTER
Please refill to [facility] - this is the last medication then need refill on for bubble pack

## 2019-08-30 NOTE — TELEPHONE ENCOUNTER
Omeprazole 20 mg and Aspirin 81 mg sent to pharmacy  Jamey Berumen you're new pcp for this patient  She has appointment scheduled on September 24th   Thanks

## 2019-09-03 ENCOUNTER — PATIENT OUTREACH (OUTPATIENT)
Dept: INTERNAL MEDICINE CLINIC | Facility: CLINIC | Age: 54
End: 2019-09-03

## 2019-09-03 LAB
AMPHETAMINES UR QL SCN: NEGATIVE NG/ML
BARBITURATES UR QL SCN: POSITIVE
BENZODIAZ UR QL: NEGATIVE NG/ML
BZE UR QL: NEGATIVE NG/ML
CANNABINOIDS UR QL SCN: NEGATIVE NG/ML
METHADONE UR QL SCN: NEGATIVE NG/ML
OPIATES UR QL: POSITIVE
PCP UR QL: NEGATIVE NG/ML
PROPOXYPH UR QL SCN: NEGATIVE NG/ML

## 2019-09-05 ENCOUNTER — TELEPHONE (OUTPATIENT)
Dept: BEHAVIORAL/MENTAL HEALTH CLINIC | Facility: CLINIC | Age: 54
End: 2019-09-05

## 2019-09-05 ENCOUNTER — PATIENT OUTREACH (OUTPATIENT)
Dept: INTERNAL MEDICINE CLINIC | Facility: CLINIC | Age: 54
End: 2019-09-05

## 2019-09-05 NOTE — PROGRESS NOTES
Outpatient Care Management Note:    Received call back from patient  She reports she met with [facility] for help with housing  She reports she filled out an application with them and needs to go back with proof that she is homeless and other information such as mental health and proof of Medicaid she reports  Patient reports she found out that her [place] medical assistance was not closed out and is working on that and also plans to go to the social security office next week  Patient reports she has been going back and forth between her daughter and son's place but unable to stay with them permanently at this time  She also reports where her daughter lives the elevator broke down and would need to do 5 flights of steps  Patient reports she has been trying to reach [place] [address] [facility] for an appointment as she was following up with them before she left for [place] and is interested in reestablishing care with them  Patient reports she missed a call from them and has tried calling back  I encouraged patient to keep trying to follow up with their office  I did remind patient that she has an appointment with pain management on 9/12 and PCP follow up on 9/24  Patient is requesting if she can [facility] for these appointments  Will clarify with  if transportation has been setup for these appointments  [facility] to get to recent Ortho appointment  Patient reports she received her bubble pack from [facility] and that it is helping her to manage her medication and take her meds when she is suppose to  I reminded patient should a physician change or stop a medication they will need to notify her pharmacy and that she should make them aware of this  Patient reports understanding  Patient reports her back is hurting her today because she has been sleeping on the couch and slept "funny" last night   Patient denies any other symptoms or complaints at this time  Patient does not have any further questions, concerns, or other needs at this time  Pt has my contact # and PCP office # if needed  Pt is agreeable for further outreach

## 2019-09-05 NOTE — TELEPHONE ENCOUNTER
Samantha is a former patient  She really needs to see you  States that she needs proof of a schedule appointment for the shelter where she lives   Please advise

## 2019-09-05 NOTE — PROGRESS NOTES
Outpatient Care Management Note:    Called patient to follow up with her  No answer, message left this is [facility] the nurse care manager calling from her primary care doctor's office  I reminded patient that I met with her when she came into the office  I stated I was calling to follow up with her and see how she is feeling and managing with her medication and remind her of her upcoming appointments  I requested a call back at [phone]

## 2019-09-06 ENCOUNTER — PATIENT OUTREACH (OUTPATIENT)
Dept: INTERNAL MEDICINE CLINIC | Facility: CLINIC | Age: 54
End: 2019-09-06

## 2019-09-09 ENCOUNTER — PATIENT OUTREACH (OUTPATIENT)
Dept: INTERNAL MEDICINE CLINIC | Facility: CLINIC | Age: 54
End: 2019-09-09

## 2019-09-09 ENCOUNTER — TELEPHONE (OUTPATIENT)
Dept: INTERNAL MEDICINE CLINIC | Facility: CLINIC | Age: 54
End: 2019-09-09

## 2019-09-09 ENCOUNTER — TELEPHONE (OUTPATIENT)
Dept: OBGYN CLINIC | Facility: OTHER | Age: 54
End: 2019-09-09

## 2019-09-09 NOTE — TELEPHONE ENCOUNTER
TO BE COMPLETED BY CENTRAL AUTH TEAM:     Physician: DR Flores Gardner     Medication: Priyanka Moore     Number of Injections in Series  3   (Appointments scheduled 1 week apart from one another):     Schedule after this date: 10/9/19    Billing Info: Buy and Bill/Specialty Pharmacy-Patient Supply  [facility]     Appointment Message Line:  B/L EUFLEXXA # 1, 2 , 3 B&B    (please copy and paste appointment message line when scheduling appointment)    Additional Comments:  LEFT MSG TO SCHEDULE VISCO AFTER 10/9 DUE TO CORTISONE AT LAST APPT

## 2019-09-09 NOTE — TELEPHONE ENCOUNTER
Received referral from [address] [place] [address] to help patient with her housing needs  I attempted to call the patient to set up an appointment to meet with the patient to go over some of her needs and a few applications-no answer

## 2019-09-10 ENCOUNTER — PATIENT OUTREACH (OUTPATIENT)
Dept: INTERNAL MEDICINE CLINIC | Facility: CLINIC | Age: 54
End: 2019-09-10

## 2019-09-10 ENCOUNTER — TELEPHONE (OUTPATIENT)
Dept: INTERNAL MEDICINE CLINIC | Facility: CLINIC | Age: 54
End: 2019-09-10

## 2019-09-10 NOTE — LETTER
September 11, 2019         Patient: Analia Adams   YOB: 1965   Date of Visit: 9/10/2019       To whom this may concern,    I am writing on behalf of our patient Ms Destinee Tovar as per her request   This patient is a wheelchair dependent patient who relates she has moved up to [place] from [place] and has been staying with her son and daughter however both are not able to offer her any permanent housing  Her daughter has a one bedroom apartment in Housing and she is not allowed to stay there  The apartment  is over crowded as her daughter has 2 children and is expecting another child  Her apartment is also on the fifth floor and the elevator is not working  She indicates her main address is with her son who resides at [address] [facility]  He is not able to offer her a permanent bedroom  Our patient has back issues , arthritis, knee pain as well as mental health issues such as bipolar disease, anxiety disorder and PTSD  She is in the process of being re-established with [facility]  We appreciate any assistance you can provide to our patient with these urgent housing needs  Sincerely,          [facility]         Sincerely,                CC: No Recipients

## 2019-09-10 NOTE — PROGRESS NOTES
SW spoke with pt via the phone this date and has assisted pt with a letter for housing  as per her request   Our , Richa Fox will meet with pt later this date to assist with delivering same to the [address] [place] [address] Worker will continue to f/u and assist with housing  transportation and care needs as indicted

## 2019-09-10 NOTE — TELEPHONE ENCOUNTER
Received call from Samantha at 4pm yesterday 9/9/19 about papers she needed to get to [employer] in order to change her income to help in her homeless situation  I made arrangement for her to call also in 09/10 to discuss a letter she needed from [address] and other information about [employer]  Patient called twice this morning to offer information requested  The Outcome is I anabel be meeting with patient after noon time today in [facility] to receive all paperwork and income information to take to [employer] [address] and [employer] on her behalf  Patient at this time refused to do the [employer] application because she stated she felt overwhelmed  I will try to do application with patient tomorrow

## 2019-09-11 ENCOUNTER — PATIENT OUTREACH (OUTPATIENT)
Dept: INTERNAL MEDICINE CLINIC | Facility: CLINIC | Age: 54
End: 2019-09-11

## 2019-09-11 DIAGNOSIS — I10 HYPERTENSION: ICD-10-CM

## 2019-09-11 DIAGNOSIS — F41.1 GENERALIZED ANXIETY DISORDER: ICD-10-CM

## 2019-09-11 RX ORDER — DULOXETIN HYDROCHLORIDE 60 MG/1
60 CAPSULE, DELAYED RELEASE ORAL DAILY
Qty: 90 CAPSULE | Refills: 0 | Status: CANCELLED | OUTPATIENT
Start: 2019-09-11

## 2019-09-11 RX ORDER — POTASSIUM CHLORIDE 20 MEQ/1
20 TABLET, EXTENDED RELEASE ORAL 2 TIMES DAILY
Qty: 30 TABLET | Refills: 0 | Status: CANCELLED | OUTPATIENT
Start: 2019-09-11

## 2019-09-11 RX ORDER — BUSPIRONE HYDROCHLORIDE 5 MG/1
5 TABLET ORAL 3 TIMES DAILY
Qty: 90 TABLET | Refills: 0 | Status: CANCELLED | OUTPATIENT
Start: 2019-09-11

## 2019-09-11 NOTE — PROGRESS NOTES
Follow up call was made to Patient today about the [employer] application  Patient also stated that she would also like to do an application for [facility]  Patient said that she was leaving at 745 am for an appointment  She and I will touch base on 09/12/19abount the [employer] and [employer] application to see which one she will do over the telephone

## 2019-09-12 ENCOUNTER — TELEPHONE (OUTPATIENT)
Dept: PAIN MEDICINE | Facility: MEDICAL CENTER | Age: 54
End: 2019-09-12

## 2019-09-12 ENCOUNTER — PATIENT OUTREACH (OUTPATIENT)
Dept: INTERNAL MEDICINE CLINIC | Facility: CLINIC | Age: 54
End: 2019-09-12

## 2019-09-12 DIAGNOSIS — I10 HYPERTENSION: ICD-10-CM

## 2019-09-12 DIAGNOSIS — F41.1 GENERALIZED ANXIETY DISORDER: ICD-10-CM

## 2019-09-12 RX ORDER — POTASSIUM CHLORIDE 20 MEQ/1
20 TABLET, EXTENDED RELEASE ORAL 2 TIMES DAILY
Qty: 30 TABLET | Refills: 0 | Status: SHIPPED | OUTPATIENT
Start: 2019-09-12 | End: 2019-09-18 | Stop reason: SDUPTHER

## 2019-09-12 RX ORDER — DULOXETIN HYDROCHLORIDE 60 MG/1
60 CAPSULE, DELAYED RELEASE ORAL DAILY
Qty: 90 CAPSULE | Refills: 0 | Status: SHIPPED | OUTPATIENT
Start: 2019-09-12 | End: 2019-11-14 | Stop reason: SDUPTHER

## 2019-09-12 RX ORDER — BUSPIRONE HYDROCHLORIDE 5 MG/1
5 TABLET ORAL 3 TIMES DAILY
Qty: 90 TABLET | Refills: 0 | Status: SHIPPED | OUTPATIENT
Start: 2019-09-12 | End: 2019-10-07 | Stop reason: SDUPTHER

## 2019-09-12 NOTE — TELEPHONE ENCOUNTER
[facility] discount pharmacy called regarding the potassium   They are wondering if the patient is only supposed to be taking this medication for 15 days as she was given 30 tablets to take 2 times a day  Please advise

## 2019-09-12 NOTE — PROGRESS NOTES
SW received call from pt this date who relates the [facility] did not pick her up despite being outside on time  SW spoke with [facility] who relates pt was not downstairs and ready for  at the appointed time and they could not go back  Pt has been rescheduled for 10/7/19 @10:30 for Dr Amber Garcia [place]  Pt is aware she needs to be down stairs and family and or friends must be able to safely help her with same as the drivers can not  Pt relates that they will help with same  SW has asked [employer] to assist with this rescheduled appointment as pt has no other rides available at this time  Our [employer] has assisted with her [place] Application which is being delivered to [employer] this date  She I also assisting pt with the [address] application and other housing applications  SW/RN CM and  to follow closely ans assist as indicated

## 2019-09-12 NOTE — PROGRESS NOTES
Called patient at 730 am to speak to her before leaving for her doctors appointment  Patient missed her [employer]ita Sl[employer] ride through [employer] and was unable to go for her appointment  At [address] [facility] [address] called patient with me present and we made arrangements for me to meet with Ms Rodriguez who was still sitting outside the home in her wheelchair  Due to her sitting outside I was able to make a visit to the home  Patients daughter doesn't want Ms Erick Hernandez bring anyone into the home  I met with Ms Erick Hernandez outside the home and she completed a [employer] application  While I was present the patient also filled at an application  For [facility] [place] [facility] [place]  Patient continually expressed her desire to have her own housing  At Patients request information going to social security is being sent in by mail  I explained to Ms Erick Hernandez that there was no drop box available at [employer] Home  At patients request other forms were returned yesterday to [facility] for assistance with housing   All forms were copied that were given to me by the patient and returned to her on 09/12/2019

## 2019-09-12 NOTE — TELEPHONE ENCOUNTER
Tried to call patient but her mailbox is full  If she calls back please make her aware we can call her if we get a cancellation  I did mare this in her appt note and will keep an eye out for cx  Appts  Thank you

## 2019-09-12 NOTE — TELEPHONE ENCOUNTER
Pt called stating that she is in a lot of pain  Pt [facility] never showed up and next ov isn't until 10/7'    Pt would like recommendation on what to do to help her   Pt can be reached at [phone]

## 2019-09-12 NOTE — TELEPHONE ENCOUNTER
Steven Barboza from [employer] is calling in confirming that the transportation never came to put patient up   There was a mix up with transportation,    If you have any questions please fell free to call: [phone]

## 2019-09-12 NOTE — PROGRESS NOTES
Letter done  Will reschedule ride for pain Management she missed today   We are still working on Kaci Iverson

## 2019-09-12 NOTE — TELEPHONE ENCOUNTER
THESE WERE ALL SENT TO [facility] ON [address]  AND IT LOOKS LIKE IT IS [facility] THAT IS CALLING NOW  I TRIED TO CONTACT THE PT  TO SEE WHICH PHARMACY SHE PREFERS AND IF SHE IS TAKING MEDS BUT HER MAILBOX IS FULL   WILL TRY AGAIN LATER    BUSPAR  CYMBALTA  K-DUR POTASSIUM

## 2019-09-13 ENCOUNTER — DOCUMENTATION (OUTPATIENT)
Dept: INTERNAL MEDICINE CLINIC | Facility: CLINIC | Age: 54
End: 2019-09-13

## 2019-09-13 NOTE — PROGRESS NOTES
Per request from [address] all information regarding Samantha Whitlock application has been copied, scanned into the chart, and returned to [facility] in [place] on 09/12/2019  Also an additional copy of the application has been copied for Ms  Agustín Nicholas and all papers for Social Security have been mailed to the [address] [place] [address] at [employer]

## 2019-09-16 ENCOUNTER — TELEPHONE (OUTPATIENT)
Dept: OBGYN CLINIC | Facility: OTHER | Age: 54
End: 2019-09-16

## 2019-09-18 DIAGNOSIS — I10 HYPERTENSION: ICD-10-CM

## 2019-09-18 DIAGNOSIS — E87.6 HYPOKALEMIA: Primary | ICD-10-CM

## 2019-09-18 RX ORDER — POTASSIUM CHLORIDE 20 MEQ/1
20 TABLET, EXTENDED RELEASE ORAL 2 TIMES DAILY
Qty: 60 TABLET | Refills: 0 | Status: SHIPPED | OUTPATIENT
Start: 2019-09-18 | End: 2019-10-13 | Stop reason: SDUPTHER

## 2019-09-18 NOTE — TELEPHONE ENCOUNTER
Letter is being mailed out to patient because we have not been able to reach her in regards to previous task

## 2019-09-18 NOTE — TELEPHONE ENCOUNTER
3rd attempt to contact patient but no response  Please mail letter to patient informing her we have been trying to contact her in regards to medication

## 2019-09-18 NOTE — TELEPHONE ENCOUNTER
Received call from [facility] with [facility]  They are in need of a refill on patient's potassium  Script sent on 9/12/19 has directions for twice a day and only 30 tablets was sent  Pharmacy will need new script sent to them with more quantity if patient is to continue taking Potassium  They need new script in order to complete patient's bubble pack

## 2019-09-20 ENCOUNTER — PATIENT OUTREACH (OUTPATIENT)
Dept: INTERNAL MEDICINE CLINIC | Facility: CLINIC | Age: 54
End: 2019-09-20

## 2019-09-20 DIAGNOSIS — M17.10 OSTEOARTHRITIS OF KNEE, UNSPECIFIED LATERALITY, UNSPECIFIED OSTEOARTHRITIS TYPE: ICD-10-CM

## 2019-09-20 NOTE — PROGRESS NOTES
Contacted patient at 11:07 am to speak to see if she received any paperwork back from [place] and how I could further assist in her housing situation

## 2019-09-21 RX ORDER — LUBIPROSTONE 24 UG/1
CAPSULE, GELATIN COATED ORAL
Qty: 30 CAPSULE | Refills: 3 | OUTPATIENT
Start: 2019-09-21

## 2019-09-23 ENCOUNTER — TELEPHONE (OUTPATIENT)
Dept: INTERNAL MEDICINE CLINIC | Facility: CLINIC | Age: 54
End: 2019-09-23

## 2019-09-23 ENCOUNTER — PATIENT OUTREACH (OUTPATIENT)
Dept: INTERNAL MEDICINE CLINIC | Facility: CLINIC | Age: 54
End: 2019-09-23

## 2019-09-23 DIAGNOSIS — I10 ESSENTIAL HYPERTENSION: ICD-10-CM

## 2019-09-23 DIAGNOSIS — I10 HYPERTENSION: ICD-10-CM

## 2019-09-23 RX ORDER — AMLODIPINE BESYLATE 5 MG/1
5 TABLET ORAL DAILY
Qty: 60 TABLET | Refills: 0 | OUTPATIENT
Start: 2019-09-23

## 2019-09-23 RX ORDER — AMLODIPINE BESYLATE 5 MG/1
5 TABLET ORAL DAILY
Qty: 60 TABLET | Refills: 0 | Status: SHIPPED | OUTPATIENT
Start: 2019-09-23 | End: 2020-03-19 | Stop reason: HOSPADM

## 2019-09-23 NOTE — PROGRESS NOTES
Outpatient Care Management Note:      Patient called  Eric Callahan to notify PCP office she had a fall and was taken to [facility] and is now at [facility] for rehab  Patient's cell phone is not currently working  Patient also left message on my phone with the above info  Patient reports  at [facility] can be reached at [phone] if needed or we need to reach patient  I will follow patient through [address] [place] [address],[facility] and will plan to follow up with her once discharged from [facility]  Patient was to have a follow up appointment at PCP office tomorrow  I had clerical staff cancel it since she is in [facility]

## 2019-09-23 NOTE — TELEPHONE ENCOUNTER
Agapito Argueta patient to touch base and inquire about any mail she may have received from our previous encounters dealing with Social Security and Housing  For a few days now since last week, patient has not been answering her phone  Patient mailbox is also full and I am unable at this time to leave messages

## 2019-09-24 ENCOUNTER — PATIENT OUTREACH (OUTPATIENT)
Dept: INTERNAL MEDICINE CLINIC | Facility: CLINIC | Age: 54
End: 2019-09-24

## 2019-09-25 ENCOUNTER — TELEPHONE (OUTPATIENT)
Dept: INTERNAL MEDICINE CLINIC | Facility: CLINIC | Age: 54
End: 2019-09-25

## 2019-09-25 ENCOUNTER — PATIENT OUTREACH (OUTPATIENT)
Dept: INTERNAL MEDICINE CLINIC | Facility: CLINIC | Age: 54
End: 2019-09-25

## 2019-09-25 NOTE — TELEPHONE ENCOUNTER
1549 pm Phone call was made to the patient to see how she is doing and will we be resuming our activities to find her a place to live  According to [facility] it was said that the patient may currently be in a nursing facility due to a fall  It is unclear when the patient may be released from the facility  Patient phone went directly to email and was unable to receive messages at this time  Outcome: After speaking to [address] [place] [address] at 1534 pm she gave me a new number  as a possible number to reach the patient  Donald Kothari also confirmed that she spoke to the patient today and that the patient is still interested in housing

## 2019-09-25 NOTE — PROGRESS NOTES
SW received call from pt who relates she now in [facility]  Pt had suffered d a fall and is now in [place] [facility] [employer]  [phone]  Pt is still very concerned re her housing situation  She realizes she can not go back to her dgt's home and also can not stay with her son  SW suggested she should be able to stay at [facility] until she is stronger and can work out suitable housing  Pt request SW call [facility] SW[facility] [phone]) and assist as able  SW to f/u and assist as indicted

## 2019-09-25 NOTE — PROGRESS NOTES
SW attempted to reach SWer at [facility] [phone] to discuss housing options need for [place] Waiver and transportation  SW had to leave a message for her to return SW call  SW did also speak with pt who was discouraged re the need for rehab and also lack of housing  Supportive Counseling provided  SW to f/u with  [facility] and assist as indicted

## 2019-09-26 ENCOUNTER — PATIENT OUTREACH (OUTPATIENT)
Dept: INTERNAL MEDICINE CLINIC | Facility: CLINIC | Age: 54
End: 2019-09-26

## 2019-09-26 NOTE — PROGRESS NOTES
HIRAM received return call from Katie at [facility] [phone] to discuss housing and other needs  SW has explained the pt has described her self as homeless and we have been trying to assist her with a [employer]  referral as well as getting on housing list   Pt has been wheel chair depended and sustained a fall which required her recent hospitalization and need for SNF Placement @ [facility]  Sw has asked if pt may be able to remain at [facility] until she can find suitable housing  Ms Kaylin Stark relaters that pt may reach her rehabs goals soon  Sw has raised concern re pt's ability to mange in a shelter  Ms Kaylin Stark also informed that pt's [place] Application was submitted and that she is in need of a face to face evaluation  HIRAM called Ms Perez back with name and # of person who would schedules this face to face evaluation  Orvil Rear [phone])  HIRAM has also related we had hoped to refer pt to the [place] Waiver Program    She is also aware pt is trying to update her address with the [facility]BI[facility] BARRETT [facility] Office  Ms Kaylin Stark with f/u with pt and her family re discharge planning  If possible our  will attempt to remain available to assist as well

## 2019-10-01 ENCOUNTER — PATIENT OUTREACH (OUTPATIENT)
Dept: INTERNAL MEDICINE CLINIC | Facility: CLINIC | Age: 54
End: 2019-10-01

## 2019-10-01 NOTE — PROGRESS NOTES
SW received call from Poonam Wilson at [facility] [phone]  who needs update on [employer] application and [facility] [facility] status  SW has requested leonarda CHW to return call  She is attempting to help pt with her [employer] application which [employer] had started  SW and CHW to remain available to assist as indicted

## 2019-10-02 ENCOUNTER — PATIENT OUTREACH (OUTPATIENT)
Dept: INTERNAL MEDICINE CLINIC | Facility: CLINIC | Age: 54
End: 2019-10-02

## 2019-10-02 NOTE — PROGRESS NOTES
Outpatient Care Management Note:    Re: chart review       Following patient in [address] [place] [address],Ground Floor and she is currently still at [facility]  Will plan to follow up with patient once she is discharged  Please see recent  notes for more info regarding housing and communication with [facility]

## 2019-10-03 ENCOUNTER — PATIENT OUTREACH (OUTPATIENT)
Dept: INTERNAL MEDICINE CLINIC | Facility: CLINIC | Age: 54
End: 2019-10-03

## 2019-10-04 NOTE — PROGRESS NOTES
Visited with patient at [facility] to discuss papers that she stated were not delivered to [employer]  Patient has been residing in two different addresses with her children  The patients information was delivered to [facility] in [facility] because Ms Evan Cortes was staying on [address] with her daughter  Ms Evan Cortes stated that she wanted to use her sons address which is in [place]  I visited the patient at the nursing home and gave her a copy of the paper work that was submitted and had the patient and the  of the facility [facility], both sign a release for the information given  Danny Brizuela will be contacted [facility] in [facility] to get the patients information faxed to her office

## 2019-10-07 ENCOUNTER — TELEPHONE (OUTPATIENT)
Dept: PAIN MEDICINE | Facility: CLINIC | Age: 54
End: 2019-10-07

## 2019-10-07 ENCOUNTER — CLINICAL SUPPORT (OUTPATIENT)
Dept: PAIN MEDICINE | Facility: CLINIC | Age: 54
End: 2019-10-07
Payer: MEDICARE

## 2019-10-07 VITALS — DIASTOLIC BLOOD PRESSURE: 70 MMHG | SYSTOLIC BLOOD PRESSURE: 103 MMHG | TEMPERATURE: 98.9 F | HEART RATE: 57 BPM

## 2019-10-07 DIAGNOSIS — M79.7 FIBROMYALGIA: ICD-10-CM

## 2019-10-07 DIAGNOSIS — G89.4 CHRONIC PAIN DISORDER: ICD-10-CM

## 2019-10-07 DIAGNOSIS — F41.1 GENERALIZED ANXIETY DISORDER: ICD-10-CM

## 2019-10-07 DIAGNOSIS — M96.1 POST LAMINECTOMY SYNDROME: ICD-10-CM

## 2019-10-07 DIAGNOSIS — G89.29 CHRONIC BILATERAL LOW BACK PAIN WITH BILATERAL SCIATICA: ICD-10-CM

## 2019-10-07 DIAGNOSIS — F11.20 UNCOMPLICATED OPIOID DEPENDENCE (HCC): ICD-10-CM

## 2019-10-07 DIAGNOSIS — M54.42 CHRONIC BILATERAL LOW BACK PAIN WITH BILATERAL SCIATICA: ICD-10-CM

## 2019-10-07 DIAGNOSIS — M16.0 PRIMARY OSTEOARTHRITIS OF BOTH HIPS: ICD-10-CM

## 2019-10-07 DIAGNOSIS — M46.1 SACROILIITIS (HCC): ICD-10-CM

## 2019-10-07 DIAGNOSIS — M54.16 LUMBAR RADICULOPATHY: Primary | ICD-10-CM

## 2019-10-07 DIAGNOSIS — M54.41 CHRONIC BILATERAL LOW BACK PAIN WITH BILATERAL SCIATICA: ICD-10-CM

## 2019-10-07 PROCEDURE — 99214 OFFICE O/P EST MOD 30 MIN: CPT | Performed by: ANESTHESIOLOGY

## 2019-10-07 RX ORDER — HYDROXYZINE HYDROCHLORIDE 25 MG/1
25 TABLET, FILM COATED ORAL EVERY 8 HOURS PRN
COMMUNITY
End: 2020-03-19 | Stop reason: HOSPADM

## 2019-10-07 RX ORDER — BUSPIRONE HYDROCHLORIDE 5 MG/1
5 TABLET ORAL 3 TIMES DAILY
Qty: 90 TABLET | Refills: 0 | Status: SHIPPED | OUTPATIENT
Start: 2019-10-07 | End: 2021-03-17 | Stop reason: ALTCHOICE

## 2019-10-07 RX ORDER — PREGABALIN 50 MG/1
50 CAPSULE ORAL 3 TIMES DAILY
Qty: 90 CAPSULE | Refills: 1 | Status: SHIPPED | OUTPATIENT
Start: 2019-10-07 | End: 2019-12-03 | Stop reason: SDUPTHER

## 2019-10-07 RX ORDER — HYDROMORPHONE HYDROCHLORIDE 2 MG/1
2 TABLET ORAL EVERY 6 HOURS PRN
COMMUNITY
End: 2019-11-25 | Stop reason: CLARIF

## 2019-10-07 RX ORDER — MELOXICAM 15 MG/1
15 TABLET ORAL DAILY
COMMUNITY
End: 2020-03-19 | Stop reason: HOSPADM

## 2019-10-07 NOTE — TELEPHONE ENCOUNTER
Call from [facility] neurosurgery  # [phone] option 1    Caller is requesting prior pain records from [place] to be scanned in

## 2019-10-07 NOTE — PROGRESS NOTES
Assessment  1  Lumbar radiculopathy    2  Chronic pain disorder    3  Chronic bilateral low back pain with bilateral sciatica    4  Fibromyalgia    5  Uncomplicated opioid dependence ([address] )    6  Primary osteoarthritis of both hips    7  Sacroiliitis ([address] )    8  Post laminectomy syndrome        Plan  58-year-old female previously seen by our practice in March 2018 with a history of fibromyalgia, lumbar degenerative disc disease, spondylosis, stenosis and radiculopathy, and chronic pain syndrome returning for interval follow-up  The patient periodically moved to [place] where she subsequently underwent a T7-S1 fusion for scoliosis correction as well as a subsequent C4-5 ACDF  The patient moved back to [place] as she no longer had any support in [place], as her daughter whom she moved to [place] to be with had lost her job there and had return to [place]  Therefore, she has return to [place] to be closer to family  The patient states that she had underwent a spinal cord stimulator trial by her pain specialist in [place] and did have a favorable trial, however she moved back to [place] before she could have a permanent implantation  Unfortunately, I do not have these records to review regarding her trial   I do have most of her records from her surgeon and pain specialist in [place] and these were reviewed  The patient was previously managed in [place] on high-dose opioid medications including morphine ER 15 mg b i d  And Dilaudid 4 mg Q 4-6 hours p r n  Trent Mckeon She states that this was somewhat controlling her pain  Of note, prior to leaving our practice she was only on morphine ER 15 mg daily, gabapentin 900 mg q h s , tizanidine 2 mg q 8 hours p r n , and Tylenol No   3 for breakthrough pain b i d  P r n  Trent Mckeon She is currently residing at [facility] and they are currently trying to wean her opioids down  She is currently on morphine ER 15 mg b i d  And Dilaudid 4 mg q 4 hours p r n  Trent Mckeon   She is also taking gabapentin 600 mg t i d , meloxicam 7 5 mg p r n , duloxetine 60 mg daily, and cyclobenzaprine 10 mg p r n     She gets approximately 30% relief from her current pain medicine regimen  I have discussed with the patient that I do not feel opioid therapy is in the patient's best interest especially considering she is also taking lorazepam for anxiety  Furthermore, the patient does have fibromyalgia and despite her high-dose opioid therapy she is still not getting adequate relief  The patient may be experiencing some hyperalgesia from being on such high-dose opioid medications for prolonged period of time  Since the patient did have a successful spinal cord stimulator trial I refer her to our neurosurgeon who may consider permanent implantation if this is technically feasible  Unfortunately, I do not have any interventional therapy to offer the patient as she has previously tried epidural steroid injections, SI joint injections, etc without any significant sustainable relief and now she has had and extension thoracolumbar fusion  I have discussed with the patient that I do not feel comfortable taking over the patient's opioids for reasons mentioned above  I will try to optimize her non opioid medications in order to assist her PCP in weaning her opioid medications  1  Referral for Neurosurgery to Dr Blanca Garcia was given to the patient since she had a successful spinal cord stimulator trial while in [place]  2  Opioids per PCP  Will try to optimize non opioid medications in order to assist in weaning down and hopefully off of opioid medications altogether  I do not feel that this is a good long-term option at controlling the patient's pain for mentions as listed above  If the patient is going to maintain on high-dose opioid therapy with suggest discontinuing lorazepam for non benzodiazepine anxiolytic alternative  3  Patient may continue with duloxetine, cyclobenzaprine, and meloxicam as prescribed   4  I would like the patient to titrate down on her gabapentin to 300 mg t i d   The patient was given titration instructions at today's visit  Once the patient is on gabapentin 300 mg t i d  X1 week I would like her to switch to Lyrica 50 mg t i d  And discontinue Gabapentin altogether  Hopefully this will help better control her neuropathic symptoms and symptoms of fibromyalgia  5  Patient will continue with her home exercise program which mainly consists of walking  6  I will follow up the patient in 8 weeks        My impressions and treatment recommendations were discussed in detail with the patient who verbalized understanding and had no further questions  Discharge instructions were provided  I personally saw and examined the patient and I agree with the above discussed plan of care  No orders of the defined types were placed in this encounter  New Medications Ordered This Visit   Medications    HYDROmorphone (DILAUDID) 2 mg tablet     Sig: Take 2 mg by mouth every 6 (six) hours as needed    hydrOXYzine HCL (ATARAX) 25 mg tablet     Sig: Take 25 mg by mouth every 8 (eight) hours as needed    meloxicam (MOBIC) 7 5 mg tablet     Sig: Take 7 5 mg by mouth daily       History of Present Illness    Samantha Gould is a 52 y o  female previously seen by our practice in March 2018 with a history of fibromyalgia, lumbar degenerative disc disease, spondylosis, stenosis and radiculopathy, and chronic pain syndrome returning for interval follow-up  The patient periodically moved to [place] where she subsequently underwent a T7-S1 fusion for scoliosis correction as well as a subsequent C4-5 ACDF  The patient moved back to [address] [place] [address] as she no longer had any support in [place], as her daughter whom she moved to [place] to be with had lost her job there and had return to [address] [place] [address]  Therefore, she has return to [address] [place] [address] to be closer to family    She presents today for interval follow-up of chronic low back pain that radiates into bilateral lower extremities with associated numbness and subjective weakness  She denies any bladder or bowel incontinence or saddle anesthesia  The patient states that she had underwent a spinal cord stimulator trial by her pain specialist in [place] and did have a favorable trial, however she moved back to [place] before she could have a permanent implantation  Unfortunately, I do not have these records to review regarding her trial   I do have most of her records from her surgeon and pain specialist in [place] and these were reviewed  The patient was previously managed in [place] on high-dose opioid medications including morphine ER 15 mg b i d  And Dilaudid 4 mg Q 4-6 hours p r n  Patsi Reil She states that this was somewhat controlling her pain  Of note, prior to leaving our practice she was only on morphine ER 15 mg daily, gabapentin 900 mg q h s , tizanidine 2 mg q 8 hours p r n , and Tylenol No   3 for breakthrough pain b i d  P r n  Patsi Reil She is currently residing at [facility] and they are currently trying to wean her opioids down  She is currently on morphine ER 15 mg b i d  And Dilaudid 4 mg q 4 hours p r n  Patsi Reil She is also taking gabapentin 600 mg t i d , meloxicam 7 5 mg p r n , duloxetine 60 mg daily, and cyclobenzaprine 10 mg p r n     The patient rates her pain as 7/10 on the pain is not follow any particular pattern throughout the day  The pain is constant and described as burning, throbbing, cramping, pressure-like, shooting, and numbness  The pain is worse with standing, walking, and exercise  The pain is alleviated with sitting, relaxation, and lying down  I have personally reviewed and/or updated the patient's past medical history, past surgical history, family history, social history, current medications, allergies, and vital signs today       Other than as stated above, the patient denies any interval changes in medications, medical condition, mental condition, symptoms, or allergies since the last office visit  Review of Systems   Constitutional: Negative for fever and unexpected weight change  HENT: Negative for trouble swallowing  Eyes: Negative for visual disturbance  Respiratory: Negative for shortness of breath and wheezing  Cardiovascular: Negative for chest pain and palpitations  Gastrointestinal: Negative for constipation, diarrhea, nausea and vomiting  Endocrine: Negative for cold intolerance, heat intolerance and polydipsia  Genitourinary: Negative for difficulty urinating and frequency  Musculoskeletal: Positive for gait problem and joint swelling  Negative for arthralgias and myalgias  Decreased ROM, swelling, pain in extremity   Skin: Negative for rash  Neurological: Positive for weakness  Negative for dizziness, seizures, syncope and headaches  Memory loss   Hematological: Does not bruise/bleed easily  Psychiatric/Behavioral: Negative for dysphoric mood  All other systems reviewed and are negative        Patient Active Problem List   Diagnosis    Chronic bilateral low back pain with bilateral sciatica    Right knee pain    Chronic pain disorder    Lumbar radiculopathy    Lumbar spondylosis    Fibromyalgia    Lumbar disc disease with radiculopathy    Spinal stenosis of lumbar region with neurogenic claudication    Anxiety    Pain in joint, shoulder region    Bilateral hip pain    Bipolar II disorder ([address] )    Cognitive disorder    Esophageal reflux    Fatigue    Female pelvic pain    Generalized anxiety disorder    Hypertension    Hypokalemia    Insomnia    Major depressive disorder, recurrent episode, moderate degree (HCC)    Migraine    Opioid dependence (HCC)    Osteoarthritis of both hips    Osteoarthritis of knee    Overactive bladder    Pain, joint, hip    Panic disorder without agoraphobia    Post traumatic stress disorder    Primary localized osteoarthritis of both knees    Recent unexplained weight loss    Sacroiliitis (HCC)    Tremor    Urinary incontinence    Vitamin D deficiency       Past Medical History:   Diagnosis Date    Acid reflux     Anxiety     RESOLVED: [id number]    Arthritis     Bipolar 2 disorder (HCC)     FOLLOWS WITH PSYCHIATRIST  CONTINUE LAMOTRIGINE; RESOLVED: [id number]    Depression     Familial tremor     both hands    Fibromyalgia     LAST ASSESSED: [id number]    Hearing aid worn     left ear    [place] (hard of hearing)     left ear    Hypertension     Left-sided weakness     Lower back pain     Memory loss of unknown cause     long and short term    Migraine     Overactive bladder     Panic attack     Post traumatic stress disorder     Seasonal allergies     Stroke [facility])     questionable stroke 2009    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  CONT ASA 81 MG DAILY; RESOLVED: [id number]    Urinary incontinence     Wears dentures     partial lower / full upper    Wears glasses        Past Surgical History:   Procedure Laterality Date    BACK SURGERY       SECTION      COLONOSCOPY      RESOLVED: [id number]    EAR SURGERY      HYSTERECTOMY  2004    MYRINGOTOMY W/ TUBES Left     NECK SURGERY  2019    VA CYSTOURETHROSCOPY N/A 2016    Procedure: CYSTOSCOPY, BOTOX INJECTION;  Surgeon: Ida Brito MD;  Location: [facility];  Service: Gynecology    TONSILLECTOMY      TUBAL LIGATION         Family History   Problem Relation Age of Onset    Colon cancer Mother     Alzheimer's disease Father     Stroke Father     Colon cancer Brother     Bipolar disorder Brother     Breast cancer Maternal Aunt     Heart disease Other     Diabetes Other     Hypertension Other     Seizures Son        Social History     Occupational History    Occupation: Disabled   Tobacco Use    Smoking status: Never Smoker    Smokeless tobacco: Never Used   Substance and Sexual Activity    Alcohol use:  No Comment: 2 x year; being a social drinker as per all scripts     Drug use: No    Sexual activity: Not on file       Current Outpatient Medications on File Prior to Visit   Medication Sig    amLODIPine (NORVASC) 5 mg tablet Take 1 tablet (5 mg total) by mouth daily    busPIRone (BUSPAR) 5 mg tablet Take 1 tablet (5 mg total) by mouth 3 (three) times a day    cyclobenzaprine (FLEXERIL) 10 mg tablet Take 1 tablet (10 mg total) by mouth daily    DULoxetine (CYMBALTA) 60 mg delayed release capsule Take 1 capsule (60 mg total) by mouth daily    gabapentin (NEURONTIN) 600 MG tablet Take 1 tablet (600 mg total) by mouth 3 (three) times a day    morphine (MS CONTIN) 15 mg 12 hr tablet Take 1 tablet (15 mg total) by mouth 2 (two) times a dayMax Daily Amount: 30 mg    omeprazole (PriLOSEC) 20 mg delayed release capsule Take 1 capsule (20 mg total) by mouth daily    QUEtiapine (SEROquel XR) 300 mg 24 hr tablet Take 1 tablet (300 mg total) by mouth daily at bedtime    aspirin 81 MG tablet Take 1 tablet (81 mg total) by mouth daily (Patient not taking: Reported on 10/7/2019)    Cholecalciferol (VITAMIN D3) 1000 units CAPS Take 1 capsule (1,000 Units total) by mouth daily    HYDROmorphone (DILAUDID) 2 mg tablet Take 2 mg by mouth every 6 (six) hours as needed    hydrOXYzine HCL (ATARAX) 25 mg tablet Take 25 mg by mouth every 8 (eight) hours as needed    LORazepam (ATIVAN) 0 5 mg tablet Take 1 tablet (0 5 mg total) by mouth every 8 (eight) hours as needed for anxiety for up to 30 days    lubiprostone (AMITIZA) 24 mcg capsule Take 1 capsule (24 mcg total) by mouth 2 (two) times a day with meals    meloxicam (MOBIC) 7 5 mg tablet Take 7 5 mg by mouth daily    oxybutynin (DITROPAN-XL) 10 MG 24 hr tablet Take 1 tablet (10 mg total) by mouth daily at bedtime    potassium chloride (K-DUR,KLOR-CON) 20 mEq tablet Take 1 tablet (20 mEq total) by mouth 2 (two) times a day    primidone (MYSOLINE) 250 mg tablet Take 1 tablet (250 mg total) by mouth daily at bedtime    propranolol (INDERAL) 20 mg tablet Take 1 tablet (20 mg total) by mouth 2 (two) times a day    tamsulosin (FLOMAX) 0 4 mg Take 1 capsule (0 4 mg total) by mouth daily with dinner    Valbenazine Tosylate (INGREZZA) 80 MG CAPS Take 80 capsules by mouth every morning (Patient not taking: Reported on 10/7/2019)     No current facility-administered medications on file prior to visit  No Known Allergies    Physical Exam    /70   Pulse 57   Temp 98 9 °F (37 2 °C) (Oral)     Constitutional: normal, well developed, well nourished, alert, in no distress and non-toxic and no overt pain behavior  Eyes: anicteric  HEENT: grossly intact  Neck: supple, symmetric, trachea midline and no masses   Pulmonary:even and unlabored  Cardiovascular:No edema or pitting edema present  Skin:Normal without rashes or lesions and well hydrated  Psychiatric:Mood and affect appropriate  Neurologic:Cranial Nerves II-XII grossly intact  Musculoskeletal:antalgic gait  Well-healed midline vertical thoracolumbar incision  Bilateral lower thoracic and lumbar paraspinal musculature tender to palpation and ropy in texture  Bilateral SI joints mildly tender to palpation  Bilateral lower extremity strength 4/5 in all muscle groups  Sensation decreased to light touch in the L4 distribution of the right lower extremity  Positive straight leg raise bilaterally  Positive Vitor's test bilaterally  Bilateral patellar and Achilles reflexes were 2/4 and symmetrical   No clonus was noted bilaterally      Imaging  Imaging reviewed

## 2019-10-08 ENCOUNTER — TELEPHONE (OUTPATIENT)
Dept: PAIN MEDICINE | Facility: MEDICAL CENTER | Age: 54
End: 2019-10-08

## 2019-10-08 NOTE — TELEPHONE ENCOUNTER
Pt called and stated she is [facility] and they stated they will right a new script for the Lyrical as per her nurse      Pt can be reached at [phone]

## 2019-10-09 NOTE — TELEPHONE ENCOUNTER
TO BE COMPLETED BY :     Office location appointment scheduled:   [facility]    Date of First Appointment scheduled: 01/21    Additional Comments:

## 2019-10-13 DIAGNOSIS — I10 HYPERTENSION: ICD-10-CM

## 2019-10-17 ENCOUNTER — TELEPHONE (OUTPATIENT)
Dept: PAIN MEDICINE | Facility: MEDICAL CENTER | Age: 54
End: 2019-10-17

## 2019-10-17 NOTE — TELEPHONE ENCOUNTER
Adele from [facility] stated she was not able to get the summary notes from the pt's visit on 10/7    She would like to know if they can be faxed again        Pat: [phone]

## 2019-10-21 ENCOUNTER — OFFICE VISIT (OUTPATIENT)
Dept: OBGYN CLINIC | Facility: CLINIC | Age: 54
End: 2019-10-21
Payer: MEDICARE

## 2019-10-21 VITALS — WEIGHT: 166 LBS | HEIGHT: 61 IN | BODY MASS INDEX: 31.34 KG/M2

## 2019-10-21 DIAGNOSIS — M17.0 PRIMARY LOCALIZED OSTEOARTHRITIS OF BOTH KNEES: Primary | ICD-10-CM

## 2019-10-21 PROCEDURE — 20610 DRAIN/INJ JOINT/BURSA W/O US: CPT | Performed by: ORTHOPAEDIC SURGERY

## 2019-10-21 RX ORDER — HYALURONATE SODIUM 10 MG/ML
20 SYRINGE (ML) INTRAARTICULAR
Status: COMPLETED | OUTPATIENT
Start: 2019-10-21 | End: 2019-10-21

## 2019-10-21 RX ADMIN — Medication 20 MG: at 16:29

## 2019-10-21 NOTE — PROGRESS NOTES
1  Primary localized osteoarthritis of both knees       Patient is here for her 1st injection of Euflexxa into the bilateral knee  Patient reports pain  All organ systems normal  Physical exam of the knee shows no effusion no ecchymosis        Large joint arthrocentesis: L knee  Date/Time: 10/21/2019 4:29 PM  Consent given by: patient  Timeout: Immediately prior to procedure a time out was called to verify the correct patient, procedure, equipment, support staff and site/side marked as required   Supporting Documentation  Indications: pain   Procedure Details  Location: knee - L knee  Needle size: 22 G  Ultrasound guidance: no  Approach: anterolateral  Medications administered: 20 mg Sodium Hyaluronate 20 MG/2ML    Patient tolerance: patient tolerated the procedure well with no immediate complications    Large joint arthrocentesis: R knee  Date/Time: 10/21/2019 4:29 PM  Consent given by: patient  Supporting Documentation  Indications: pain   Procedure Details  Location: knee - R knee  Needle size: 22 G  Ultrasound guidance: no  Approach: anterolateral  Medications administered: 20 mg Sodium Hyaluronate 20 MG/2ML    Patient tolerance: patient tolerated the procedure well with no immediate complications          Patient tolerated procedure follow up 1 week

## 2019-10-29 ENCOUNTER — PATIENT OUTREACH (OUTPATIENT)
Dept: INTERNAL MEDICINE CLINIC | Facility: CLINIC | Age: 54
End: 2019-10-29

## 2019-10-29 RX ORDER — POTASSIUM CHLORIDE 20 MEQ/1
20 TABLET, EXTENDED RELEASE ORAL 2 TIMES DAILY
Qty: 60 TABLET | Refills: 0 | Status: SHIPPED | OUTPATIENT
Start: 2019-10-29 | End: 2019-11-21

## 2019-10-30 NOTE — PROGRESS NOTES
Outpatient Care Management Note:    Received message from patient requesting to call her  I called and spoke with patient  She is still currently in [facility] in [place]  She had questions and concerns regarding her medication, appointments, and care  I made patient aware she is under the care of the staff and physicians at [facility]  I advised her to speak with the nurse and doctor's regarding her medication questions and follow up appointments  Patient reports she is working with the  there to get housing  Patient reports no tentative discharge date at this time  Patient is aware once she is discharged from [facility] to follow up with her PCP office  I did encourage patient that once she knows when she will be discharged to call me or our  Carla Huber to let us know  Patient reports understanding and does not have any further questions or concerns for me at this time

## 2019-10-31 NOTE — TELEPHONE ENCOUNTER
Pt called stating she would like help to get her records from [facility]   Pt states that neurosurgry needs them and does not see them scanning into chart      Please call pt to help her she seems confused     Pt can be reached at [phone]

## 2019-11-01 ENCOUNTER — OFFICE VISIT (OUTPATIENT)
Dept: OBGYN CLINIC | Facility: CLINIC | Age: 54
End: 2019-11-01
Payer: MEDICARE

## 2019-11-01 DIAGNOSIS — M17.0 PRIMARY LOCALIZED OSTEOARTHRITIS OF BOTH KNEES: Primary | ICD-10-CM

## 2019-11-01 PROCEDURE — 20610 DRAIN/INJ JOINT/BURSA W/O US: CPT | Performed by: ORTHOPAEDIC SURGERY

## 2019-11-01 RX ORDER — IBUPROFEN 800 MG/1
TABLET ORAL
COMMUNITY
Start: 2016-08-18 | End: 2019-11-21

## 2019-11-01 RX ORDER — HYALURONATE SODIUM 10 MG/ML
20 SYRINGE (ML) INTRAARTICULAR
Status: COMPLETED | OUTPATIENT
Start: 2019-11-01 | End: 2019-11-01

## 2019-11-01 RX ORDER — RISPERIDONE 2 MG/1
TABLET, FILM COATED ORAL
COMMUNITY
Start: 2013-05-24 | End: 2019-11-21

## 2019-11-01 RX ORDER — TRAMADOL HYDROCHLORIDE 50 MG/1
TABLET ORAL
COMMUNITY
Start: 2016-02-24 | End: 2019-11-21

## 2019-11-01 RX ORDER — SOLIFENACIN SUCCINATE 10 MG/1
TABLET, FILM COATED ORAL
COMMUNITY
Start: 2016-03-10 | End: 2019-11-21

## 2019-11-01 RX ORDER — POTASSIUM CHLORIDE 1.5 G/1.77G
POWDER, FOR SOLUTION ORAL
COMMUNITY
Start: 2016-01-28 | End: 2019-11-21

## 2019-11-01 RX ORDER — TIZANIDINE 2 MG/1
TABLET ORAL
COMMUNITY
Start: 2017-03-22 | End: 2019-11-21

## 2019-11-01 RX ORDER — VENLAFAXINE 25 MG/1
TABLET ORAL
COMMUNITY
Start: 2015-05-06 | End: 2019-11-21

## 2019-11-01 RX ORDER — TRAZODONE HYDROCHLORIDE 150 MG/1
TABLET ORAL
COMMUNITY
Start: 2013-05-24 | End: 2019-11-21

## 2019-11-01 RX ORDER — BACLOFEN 10 MG/1
TABLET ORAL
COMMUNITY
Start: 2014-09-19 | End: 2019-11-21

## 2019-11-01 RX ORDER — DICLOFENAC POTASSIUM 50 MG/1
TABLET, FILM COATED ORAL
COMMUNITY
Start: 2016-10-31 | End: 2019-11-21

## 2019-11-01 RX ADMIN — Medication 20 MG: at 15:13

## 2019-11-01 NOTE — PROGRESS NOTES
1  Primary localized osteoarthritis of both knees       Patient is here for her 2nd injection of Euflexxa into the bilateral knee  Patient reports no improvement  All organ systems normal  Physical exam of the knee shows no effusion no ecchymosis        Large joint arthrocentesis: L knee  Date/Time: 11/1/2019 3:13 PM  Consent given by: patient  Timeout: Immediately prior to procedure a time out was called to verify the correct patient, procedure, equipment, support staff and site/side marked as required   Supporting Documentation  Indications: pain   Procedure Details  Location: knee - L knee  Needle size: 22 G  Ultrasound guidance: no  Approach: anterolateral  Medications administered: 20 mg Sodium Hyaluronate 20 MG/2ML    Patient tolerance: patient tolerated the procedure well with no immediate complications    Large joint arthrocentesis: R knee  Date/Time: 11/1/2019 3:13 PM  Consent given by: patient  Supporting Documentation  Indications: pain   Procedure Details  Location: knee - R knee  Needle size: 22 G  Ultrasound guidance: no  Approach: anterolateral  Medications administered: 20 mg Sodium Hyaluronate 20 MG/2ML    Patient tolerance: patient tolerated the procedure well with no immediate complications          Patient tolerated procedure follow up 1 week

## 2019-11-08 ENCOUNTER — OFFICE VISIT (OUTPATIENT)
Dept: OBGYN CLINIC | Facility: CLINIC | Age: 54
End: 2019-11-08
Payer: MEDICARE

## 2019-11-08 VITALS
SYSTOLIC BLOOD PRESSURE: 130 MMHG | HEART RATE: 67 BPM | DIASTOLIC BLOOD PRESSURE: 84 MMHG | BODY MASS INDEX: 31.37 KG/M2 | HEIGHT: 61 IN

## 2019-11-08 DIAGNOSIS — M17.0 PRIMARY LOCALIZED OSTEOARTHRITIS OF BOTH KNEES: Primary | ICD-10-CM

## 2019-11-08 PROCEDURE — 20610 DRAIN/INJ JOINT/BURSA W/O US: CPT | Performed by: ORTHOPAEDIC SURGERY

## 2019-11-08 RX ORDER — HYALURONATE SODIUM 10 MG/ML
20 SYRINGE (ML) INTRAARTICULAR
Status: COMPLETED | OUTPATIENT
Start: 2019-11-08 | End: 2019-11-08

## 2019-11-08 RX ADMIN — Medication 20 MG: at 14:35

## 2019-11-08 NOTE — PROGRESS NOTES
1  Primary localized osteoarthritis of both knees       Patient is here for her 3rd injection of Euflexxa into the bilateral knee  Patient reports knees feeling okay  All organ systems normal  Physical exam of the knee shows no effusion no ecchymosis  Large joint arthrocentesis: L knee  Date/Time: 11/8/2019 2:35 PM  Consent given by: patient  Timeout: Immediately prior to procedure a time out was called to verify the correct patient, procedure, equipment, support staff and site/side marked as required   Supporting Documentation  Indications: pain   Procedure Details  Location: knee - L knee  Needle size: 22 G  Ultrasound guidance: no  Approach: anterolateral  Medications administered: 20 mg Sodium Hyaluronate 20 MG/2ML    Patient tolerance: patient tolerated the procedure well with no immediate complications    Large joint arthrocentesis: R knee  Date/Time: 11/8/2019 2:35 PM  Consent given by: patient  Supporting Documentation  Indications: pain   Procedure Details  Location: knee - R knee  Needle size: 22 G  Ultrasound guidance: no  Approach: anterolateral  Medications administered: 20 mg Sodium Hyaluronate 20 MG/2ML    Patient tolerance: patient tolerated the procedure well with no immediate complications          Patient tolerated procedure follow up p r n

## 2019-11-12 ENCOUNTER — TELEPHONE (OUTPATIENT)
Dept: INTERNAL MEDICINE CLINIC | Facility: CLINIC | Age: 54
End: 2019-11-12

## 2019-11-12 NOTE — TELEPHONE ENCOUNTER
Pt was Inpatient at [facility] and will be discharged Thursday 11/14/19  Please call patient then  The NP that she seen insisted on scheduling now because she did not think the patient would follow up  Pt is scheduled for her TCM 11/21/19  Dajuan Galicia

## 2019-11-14 DIAGNOSIS — N39.41 URGE INCONTINENCE OF URINE: ICD-10-CM

## 2019-11-14 DIAGNOSIS — F41.1 GENERALIZED ANXIETY DISORDER: ICD-10-CM

## 2019-11-14 RX ORDER — DULOXETIN HYDROCHLORIDE 60 MG/1
CAPSULE, DELAYED RELEASE ORAL
Qty: 90 CAPSULE | Refills: 0 | OUTPATIENT
Start: 2019-11-14

## 2019-11-14 RX ORDER — DULOXETIN HYDROCHLORIDE 60 MG/1
60 CAPSULE, DELAYED RELEASE ORAL DAILY
Qty: 90 CAPSULE | Refills: 0 | Status: SHIPPED | OUTPATIENT
Start: 2019-11-14 | End: 2021-03-17 | Stop reason: SDUPTHER

## 2019-11-14 RX ORDER — OXYBUTYNIN CHLORIDE 10 MG/1
10 TABLET, EXTENDED RELEASE ORAL
Qty: 90 TABLET | Refills: 0 | Status: SHIPPED | OUTPATIENT
Start: 2019-11-14 | End: 2020-03-19 | Stop reason: HOSPADM

## 2019-11-14 RX ORDER — OXYBUTYNIN CHLORIDE 10 MG/1
TABLET, EXTENDED RELEASE ORAL
Qty: 90 TABLET | Refills: 0 | OUTPATIENT
Start: 2019-11-14

## 2019-11-15 ENCOUNTER — PATIENT OUTREACH (OUTPATIENT)
Dept: INTERNAL MEDICINE CLINIC | Facility: CLINIC | Age: 54
End: 2019-11-15

## 2019-11-15 ENCOUNTER — TRANSITIONAL CARE MANAGEMENT (OUTPATIENT)
Dept: INTERNAL MEDICINE CLINIC | Facility: CLINIC | Age: 54
End: 2019-11-15

## 2019-11-15 NOTE — PROGRESS NOTES
Spoke with patient  She reports she is now living in a 2 bedroom 1st floor apartment in [facility]  She shares apartment with another lady in her late 19's  She reports it is transitional housing with [facility]  She reports she is working with a [employer] number [phone] with this program  She has a meeting with her later today  She reports [facility] is also helping her to get SNAP benefits (food stamps)  Patient reports she is feeling well other than some swelling off and on in her legs and her chronic back pain  She was given morphine and dilaudid while at [facility] and per records from [facility] was given a 2 week supple  She is unsure if she will be receiving home PT/OT but has a meeting later today with [facility] to further discuss things  Patient reports were she is living they have people that manager her medication  Patient reports frustration with her mental health medication and being weaned off of her ativan and atarax  Patient kept referring to her doctor's in [place]  I made patient aware that she is now in [place] and has been in and out of the hospital and to nursing home for rehab and will need to establish care with a mental health provider for ongoing care and treatment  She reports the transitional housing is setting her up with mental health but unsure of the name but will let PCP office now  Patient reports she is using a walker in her apartment and has wheelchair longer distances  Patient made aware of PCP appointment on 11/21 @ 10 am and pain management on 12/3 @ 10:45 am  Patient currently does not have [place] services and scheduled for in person evaluation on 11/20  Patient is aware she will not be approved in time for her PCP appointment  Patient believes the transitional housing may offer transportation to appointments  She will check and if still needs ride to 11/21 appointment she will call PCP office beginning of next week (possible lyft ride)       Patient does not have any further questions, concerns, or other needs at this time  Pt has my contact # and PCP office # if needed  Pt is agreeable for further outreach  Patient reports her grandchildren came to visit her and needs to go

## 2019-11-15 NOTE — PROGRESS NOTES
Outpatient Care Management Note:    Received notification through [employer] that patient was discharged from [facility] in [facility] on 11/14/19  to an apartment  I called patient to follow up with her  No answer, message left this is Geetha Martinez the nurse calling from her primary care doctor's office to follow up with her and see how she is feeling and ensure she has everything she needs at this time  I did remind her she has an appointment with PCP office on 11/21 @ 10 am      Patient does have upcoming Pain Management appt on 12/3 @ 10:45 am  Per nursing home records patient was discharge with home PT/OT for chronic back pain and to follow up closely with pain management and neurosurgery  Patient to follow up with mental health  Patient weaned off ativan and atarax  Constipation being controlled by lactulose

## 2019-11-21 ENCOUNTER — OFFICE VISIT (OUTPATIENT)
Dept: INTERNAL MEDICINE CLINIC | Facility: CLINIC | Age: 54
End: 2019-11-21

## 2019-11-21 ENCOUNTER — TELEPHONE (OUTPATIENT)
Dept: INTERNAL MEDICINE CLINIC | Facility: CLINIC | Age: 54
End: 2019-11-21

## 2019-11-21 ENCOUNTER — TELEPHONE (OUTPATIENT)
Dept: PAIN MEDICINE | Facility: CLINIC | Age: 54
End: 2019-11-21

## 2019-11-21 VITALS — DIASTOLIC BLOOD PRESSURE: 76 MMHG | TEMPERATURE: 98.8 F | SYSTOLIC BLOOD PRESSURE: 126 MMHG | HEART RATE: 64 BPM

## 2019-11-21 DIAGNOSIS — M51.16 LUMBAR DISC DISEASE WITH RADICULOPATHY: ICD-10-CM

## 2019-11-21 DIAGNOSIS — Z12.39 SCREENING FOR BREAST CANCER: ICD-10-CM

## 2019-11-21 DIAGNOSIS — Z00.00 HEALTHCARE MAINTENANCE: ICD-10-CM

## 2019-11-21 DIAGNOSIS — F11.20 UNCOMPLICATED OPIOID DEPENDENCE (HCC): ICD-10-CM

## 2019-11-21 DIAGNOSIS — IMO0001 TRANSITION OF CARE PERFORMED WITH SHARING OF CLINICAL SUMMARY: Primary | ICD-10-CM

## 2019-11-21 DIAGNOSIS — F31.81 BIPOLAR II DISORDER (HCC): ICD-10-CM

## 2019-11-21 DIAGNOSIS — R26.2 AMBULATORY DYSFUNCTION: ICD-10-CM

## 2019-11-21 DIAGNOSIS — F41.0 PANIC DISORDER WITHOUT AGORAPHOBIA: ICD-10-CM

## 2019-11-21 DIAGNOSIS — I10 HYPERTENSION, UNSPECIFIED TYPE: ICD-10-CM

## 2019-11-21 PROCEDURE — 99496 TRANSJ CARE MGMT HIGH F2F 7D: CPT | Performed by: INTERNAL MEDICINE

## 2019-11-21 RX ORDER — HYDROMORPHONE HYDROCHLORIDE 2 MG/1
2 TABLET ORAL EVERY 6 HOURS PRN
COMMUNITY
Start: 2019-11-14 | End: 2019-11-21

## 2019-11-21 RX ORDER — MORPHINE SULFATE 15 MG/1
15 TABLET, FILM COATED, EXTENDED RELEASE ORAL 2 TIMES DAILY
COMMUNITY
Start: 2019-11-12 | End: 2019-11-21

## 2019-11-21 RX ORDER — PREGABALIN 50 MG/1
50 CAPSULE ORAL 3 TIMES DAILY
COMMUNITY
Start: 2019-11-12 | End: 2019-11-21

## 2019-11-21 NOTE — PROGRESS NOTES
INTERNAL MEDICINE FOLLOW-UP OFFICE VISIT  [facility]  [address] [place] [address]    NAME: Akihl Quevedo  AGE: 52 y o  SEX: female    DATE OF ENCOUNTER: 11/21/2019    Assessment and Plan     Diagnoses and all orders for this visit:    TCM Call (since 10/21/2019)     Date and time call was made  11/15/2019  2:40 PM    Hospital care reviewed  Records reviewed    Patient was hospitialized at  Other (comment)    [address] [place] [address]    Date of Admission  09/19/19    Date of discharge  11/14/19    Diagnosis  Chronic bilateral low back pain with bilateral sciatica    Disposition  Home    Were the patients medications reviewed and updated  No    Current Symptoms  Back pain - left side; Back pain - right side; Swelling      TCM Call (since 10/21/2019)     Clinical progress swelling  -- <img src='C:FILES (X86)    Scheduled for follow up? Yes <img src='C:FILES (X86)    Patients specialists  Other (comment)    Other specialists names  [facility] Neuro[facility]    Did you obtain your prescribed medications  Yes    Do you need help managing your prescriptions or medications  No    Is transportation to your appointment needed  No <img src='C:FILES (X86)    I have advised the patient to call PCP with any new or worsening symptoms  Cheryl Alvarez RN    Have you fallen in the last 12 months  -- <img src='C:FILES (X86)    Interperter language line needed  No    Counseling  Patient    Counseling topics  instructions for management; patient and family education; Importance of RX compliance    Comments  Patient is currently staying at an apartment in [facility] that is transitional housing  See note from Milana Cushing, RN Care manager          Transition of care performed with sharing of clinical summary  Patient was recently hospitalized in September of 2019 for a fall which she sustained, no major injuries were noted at the time, but was noted to have need for rehabilitation  Was in [facility] until 11/14/2019  Patient was post to get home PT and OT which was never ordered from [facility], will order referral for home however for PT and OT  Follow up in 3 months for annual    Lumbar disc disease with radiculopathy  Patient has chronic back pain with radiculopathy bilaterally, is following with [facility]  Is currently on MS Contin and Dilaudid for pain, however patient requested that she have extended until her appointment in 12/3, educated patient that we would not be prescribing her opioid medication if she is being managed by pain specialist as this can lead to her dismissal   Patient understood  Continue Lyrica  Follow up with [facility] on 32/63/5525    Uncomplicated opioid dependence ([facility] [address] )  Patient has a history of opioid dependence secondary to back pain, she is being followed up with [facility]  Currently on MS Contin and Dilaudid, will defer pain management to pain specialist at this time  States she is having regular bowel movements, continue lubiprostone  Follow up with pain specialist on 12/3/2019    Panic disorder without agoraphobia  Patient has history of panic disorder  Was on Ativan which was tapered in [facility]  Given the patient is currently on MS Contin and Dilaudid, interactions with lorazepam probably put patient at increased risk for developing adverse events  Patient does have psychiatry appointment next week for follow-up, can discuss further medication adjustments at that visit      Bipolar II disorder ([facility] [address] )  History of bipolar disorder, follows with psychiatrist, next appointment next week  Continue Seroquel 300 mg q h s , BuSpar 5 mg t i d , Atarax 25 mg  Follow-up with Psychiatry    Healthcare maintenance  Patient is due for colonoscopy last time 2014, recommended 5 year colonoscopy secondary to poor prep, referral given  Patient is due for mammogram, ordered  Patient is due for Pap smear, referral given  Patient received flu shot vaccine while at [address]  Follow-up in 3 months for annual physical with PCP  -     Ambulatory referral to Gastroenterology; Future  -     Ambulatory referral to Obstetrics / Gynecology; Future    Ambulatory dysfunction  Patient has history of ambulatory dysfunction secondary to her lumbar pain  Is following with [facility]  Currently ambulates with walker at home  Ordered home PT/OT home health referral  -     Ambulatory Referral to [address]; Future    Hypertension, unspecified type  Well controlled, patient's blood pressure in office 124/73  Continue on amlodipine    Screening for breast cancer  -     Mammo screening bilateral w cad; Future        Orders Placed This Encounter   Procedures    Mammo screening bilateral w cad    Ambulatory referral to Gastroenterology    Ambulatory Referral to [address] Ambulatory referral to Obstetrics / Gynecology           Chief Complaint     Chief Complaint   Patient presents with    Transition of Care Management     fall at home       History of Present Illness     47-year-old female past medical history of lumbar radiculopathy, status post back surgery earlier this year, opioid dependence, hypertension, bipolar disorder, generalized anxiety disorder presents to the office for transition of care  Patient was hospitalized in September of 2019 status post fall with no major injuries noted  She has been in [facility] for the past 2 months, recently discharged on 11/14/2019  Patient complaint today is her back pain, which is a remained the same, endorses bilateral lower back pain and stiffness, worse in the morning, but constant with associated sharp radiating pain down bilateral posterior aspects of legs  With associated numbness and tingling sensations  She ambulates with a walker at baseline currently, was tolerating physical therapy well while at [facility]    He is currently on MS Contin and Dilaudid for pain control managed by [facility]  She denies any other complaints today including chest pain, shortness of breath, nausea, vomiting, changes in bowel or bladder  Patient has follow-up appointments made with [facility] on 12/03/2019 and her psychiatrist next week  Patient states that she was supposed to get physical therapy and occupational therapy at home, however this was not completed by [facility]  She currently lives in a transitional home, she is accompanied by her nurse who manages her medications  The following portions of the patient's history were reviewed and updated as appropriate: allergies, current medications, past family history, past medical history, past social history, past surgical history and problem list     Review of Systems       ROS  CONSTITUTIONAL: Denies any fever, chills, rigors, and weight loss  HEENT: No earache or tinnitus  Denies hearing loss  CARDIOVASCULAR: No chest pain or palpitations  RESPIRATORY: Denies any cough, hemoptysis, shortness of breath or dyspnea on exertion  GASTROINTESTINAL: Denies abdominal pain, nausea, vomitng   NEUROLOGIC: No dizziness or vertigo, denies headaches  MUSCULOSKELETAL:   Positive for back pain  SKIN: Denies skin rashes or itching  All other systems reviewed and were negative        Active Problem List     Patient Active Problem List   Diagnosis    Chronic bilateral low back pain with bilateral sciatica    Right knee pain    Chronic pain disorder    Lumbar radiculopathy    Lumbar spondylosis    Fibromyalgia    Lumbar disc disease with radiculopathy    Spinal stenosis of lumbar region with neurogenic claudication    Anxiety    Pain in joint, shoulder region    Bilateral hip pain    Bipolar II disorder ([address] )    Cognitive disorder    Esophageal reflux    Fatigue    Female pelvic pain    Generalized anxiety disorder    Hypertension    Hypokalemia    Insomnia    Major depressive disorder, recurrent episode, moderate degree (HCC)    Migraine    Opioid dependence ([facility] [address] )    Osteoarthritis of both hips    Osteoarthritis of knee    Overactive bladder    Pain, joint, hip    Panic disorder without agoraphobia    Post traumatic stress disorder    Primary localized osteoarthritis of both knees    Recent unexplained weight loss    Sacroiliitis (HCC)    Tremor    Urinary incontinence    Vitamin D deficiency    Post laminectomy syndrome       Objective     /76   Pulse 64   Temp 98 8 °F (37 1 °C)     Physical Exam   Constitutional: She is oriented to person, place, and time  She appears well-developed and well-nourished  No distress  HENT:   Head: Normocephalic and atraumatic  Mouth/Throat: Oropharynx is clear and moist    Missing teeth, poor dentition   Eyes: Conjunctivae and EOM are normal    Neck: Neck supple  Cardiovascular: Normal rate, regular rhythm, normal heart sounds and intact distal pulses  Exam reveals no gallop and no friction rub  No murmur heard  Pulmonary/Chest: Effort normal and breath sounds normal  No stridor  No respiratory distress  She has no wheezes  She has no rales  Abdominal: Soft  Bowel sounds are normal  She exhibits no distension  There is no tenderness  There is no guarding  Musculoskeletal: She exhibits tenderness  Range of motion in the lumbar spine limited secondary to pain  Muscle hypertonicity noted in the lower thoracic and upper lumbar spine, bilaterally  Unable to complete straight leg raise, secondary to patient unable to lie flat secondary to pain  Neurological: She is alert and oriented to person, place, and time  Skin: Skin is warm and dry  Psychiatric: She has a normal mood and affect          Pertinent Laboratory/Diagnostic Studies:  CBC:   Lab Results   Component Value Date/Time    WBC 4 50 10/26/2015 11:23 AM    RBC 4 80 10/26/2015 11:23 AM    HGB 14 9 10/26/2015 11:23 AM    HCT 39 7 02/16/2016 12:16 PM    HCT 41 9 10/26/2015 11:23 AM    MCV 87 10/26/2015 11:23 AM    MCH 31 0 10/26/2015 11:23 AM    MCHC 35 6 10/26/2015 11:23 AM    RDW 12 9 10/26/2015 11:23 AM    MPV 9 5 10/26/2015 11:23 AM     10/26/2015 11:23 AM    NRBC 0 10/26/2015 11:23 AM    NEUTOPHILPCT 54 10/26/2015 11:23 AM    LYMPHOPCT 36 10/26/2015 11:23 AM    MONOPCT 8 10/26/2015 11:23 AM    EOSPCT 1 10/26/2015 11:23 AM    BASOPCT 1 10/26/2015 11:23 AM    NEUTROABS 2 43 10/26/2015 11:23 AM    LYMPHSABS 1 62 10/26/2015 11:23 AM    MONOSABS 0 36 10/26/2015 11:23 AM    EOSABS 0 05 10/26/2015 11:23 AM     Chemistry Profile:   Lab Results   Component Value Date/Time     07/27/2015 12:06 PM    K 3 2 (L) 07/18/2017 01:47 PM    K 3 6 07/27/2015 12:06 PM     07/18/2017 01:47 PM    CL 99 (L) 07/27/2015 12:06 PM    CO2 31 07/18/2017 01:47 PM    CO2 31 07/27/2015 12:06 PM    ANIONGAP 7 07/27/2015 12:06 PM    BUN 6 07/18/2017 01:47 PM    BUN 6 07/27/2015 12:06 PM    CREATININE 0 88 07/18/2017 01:47 PM    CREATININE 0 75 07/27/2015 12:06 PM    GLUC 94 07/18/2017 01:47 PM    GLUCOSE 96 07/27/2015 12:06 PM    CALCIUM 8 5 07/18/2017 01:47 PM    CALCIUM 9 1 07/27/2015 12:06 PM    AST 13 07/18/2017 01:47 PM    AST 9 02/22/2014 06:15 AM    ALT 19 07/18/2017 01:47 PM    ALT 18 02/22/2014 06:15 AM    ALKPHOS 86 07/18/2017 01:47 PM    ALKPHOS 96 02/22/2014 06:15 AM    PROT 6 9 02/22/2014 06:15 AM    BILITOT 0 51 02/22/2014 06:15 AM    EGFR >60 0 07/18/2017 01:47 PM     Endocrine Studies:   Lab Results   Component Value Date/Time    WIQ9SVVIXCGZ 1 084 05/04/2015 10:44 AM    TRIG 80 02/22/2014 06:15 AM    CHOL 157 02/22/2014 06:15 AM    HDL 44 02/22/2014 06:15 AM    LDLCALC 97 02/22/2014 06:15 AM    [id number] 30 5 07/18/2017 01:47 PM    QLDF89BJKHXO 31 8 10/26/2015 11:23 AM         Current Medications     Current Outpatient Medications:     amLODIPine (NORVASC) 5 mg tablet, Take 1 tablet (5 mg total) by mouth daily, Disp: 60 tablet, Rfl: 0    aspirin 81 MG tablet, Take 1 tablet (81 mg total) by mouth daily, Disp: 30 tablet, Rfl: 3    busPIRone (BUSPAR) 5 mg tablet, TAKE 1 TABLET (5 MG TOTAL) BY MOUTH 3 (THREE) TIMES A DAY, Disp: 90 tablet, Rfl: 0    Cholecalciferol (VITAMIN D3) 1000 units CAPS, Take 1 capsule (1,000 Units total) by mouth daily, Disp: 90 capsule, Rfl: 0    DULoxetine (CYMBALTA) 60 mg delayed release capsule, Take 1 capsule (60 mg total) by mouth daily, Disp: 90 capsule, Rfl: 0    HYDROmorphone (DILAUDID) 2 mg tablet, Take 2 mg by mouth every 6 (six) hours as needed, Disp: , Rfl:     hydrOXYzine HCL (ATARAX) 25 mg tablet, Take 25 mg by mouth every 8 (eight) hours as needed, Disp: , Rfl:     lubiprostone (AMITIZA) 24 mcg capsule, Take 1 capsule (24 mcg total) by mouth 2 (two) times a day with meals, Disp: 30 capsule, Rfl: 3    meloxicam (MOBIC) 7 5 mg tablet, Take 7 5 mg by mouth daily, Disp: , Rfl:     morphine (MS CONTIN) 15 mg 12 hr tablet, Take 1 tablet (15 mg total) by mouth 2 (two) times a dayMax Daily Amount: 30 mg, Disp: 60 tablet, Rfl: 0    omeprazole (PriLOSEC) 20 mg delayed release capsule, Take 1 capsule (20 mg total) by mouth daily, Disp: 60 capsule, Rfl: 0    oxybutynin (DITROPAN-XL) 10 MG 24 hr tablet, Take 1 tablet (10 mg total) by mouth daily at bedtime, Disp: 90 tablet, Rfl: 0    pregabalin (LYRICA) 50 mg capsule, Take 1 capsule (50 mg total) by mouth 3 (three) times a day, Disp: 90 capsule, Rfl: 1    primidone (MYSOLINE) 250 mg tablet, Take 1 tablet (250 mg total) by mouth daily at bedtime, Disp: 90 tablet, Rfl: 0    propranolol (INDERAL) 20 mg tablet, Take 1 tablet (20 mg total) by mouth 2 (two) times a day, Disp: 120 tablet, Rfl: 0    QUEtiapine (SEROquel XR) 300 mg 24 hr tablet, Take 1 tablet (300 mg total) by mouth daily at bedtime, Disp: 60 tablet, Rfl: 1    tamsulosin (FLOMAX) 0 4 mg, Take 1 capsule (0 4 mg total) by mouth daily with dinner, Disp: 60 capsule, Rfl: 1    Health Maintenance     Health Maintenance   Topic Date Due    Medicare Annual Wellness Visit (AWV)  1965    CRC Screening: Colonoscopy  1965    HIV Screening  12/26/1978    BMI: Followup Plan  12/26/1981    Cervical Cancer Screening  12/26/1984    MAMMOGRAM  10/10/2014    Influenza Vaccine  07/01/2019    BMI: Adult  10/21/2020    DTaP,Tdap,and Td Vaccines (2 - Td) 07/25/2024    Pneumococcal Vaccine: 65+ Years (1 of 2 - PCV13) 12/26/2028    Hepatitis C Screening  Completed    Pneumococcal Vaccine: Pediatrics (0 to 5 Years) and At-Risk Patients (6 to 59 Years)  Aged Out    HIB Vaccine  Aged Out    Hepatitis B Vaccine  Aged Out    IPV Vaccine  Aged Out    Hepatitis A Vaccine  Aged Out    Meningococcal ACWY Vaccine  Aged Out    HPV Vaccine  Aged [employer] History   Administered Date(s) Administered    Influenza TIV (IM) 10/02/2013, 11/20/2014, 10/26/2015    Tdap 07/25/2014       No is this patient a diabetic? If not contraindicated, are they prescribed metformin? [address] [facility] [address]    Internal Medicine PGY-1  11/21/2019 11:34 AM

## 2019-11-21 NOTE — TELEPHONE ENCOUNTER
S/w Deidra Helms, RN at [facility]  States the pt was recently hospitalized after a fall, then discharged to [facility]  After [facility], she was sent to [facility] and it was requested that providers there give refills for pain medications to last until office visit with [facility] on 12/3  No refills were given at time of discharge and patient is running low on pain medication  She is currently taking MS Contin (5 days left in med supply), Dilaudid (6 days left in med supply), and Lyrica (2 days left in med supply)  At the time of the call, Deidra Helms was unsure what the dosages of each medication were  Advised RN that only medication recently filled by [facility] was Lyrica and pt has not been seen in the office for an extended period of time, may need earlier appt  Please advise, thank you

## 2019-11-21 NOTE — TELEPHONE ENCOUNTER
([address] Dawkins) form to be completed at today's visit  Form handed to [facility])  Please complete, review and copy form before handing back to patient  If form is not completed at the time of visit, please update this message  Form is to be (GIVEN TO PATIENT)

## 2019-11-21 NOTE — TELEPHONE ENCOUNTER
S/w Ainsley SIFUENTES, advised of the same  Verbalized understanding and appreciation  Advised to cb with any further questions or concerns

## 2019-11-21 NOTE — PATIENT INSTRUCTIONS
Continue your current medications  Continue to follow up with your pain specialist  Marcus Harris have an appointment on 12/3/19  Follow up with psychiatry  Follow up with us in 3 months for an annual physical     Back Pain   WHAT YOU NEED TO KNOW:   Back pain is common  It can be caused by many conditions, such as arthritis or the breakdown of spinal discs  Your risk for back pain is increased by injuries, lack of activity, or repeated bending and twisting  You may feel sore or stiff on one or both sides of your back  The pain may spread to your buttocks or thighs  DISCHARGE INSTRUCTIONS:   Medicines:   · NSAIDs  help decrease swelling and pain  This medicine is available with or without a doctor's order  NSAIDs can cause stomach bleeding or kidney problems in certain people  If you take blood thinner medicine, always ask your healthcare provider if NSAIDs are safe for you  Always read the medicine label and follow directions  · Acetaminophen  decreases pain  It is available without a doctor's order  Ask how much to take and how often to take it  Follow directions  Acetaminophen can cause liver damage if not taken correctly  · Prescription pain medicine  may be given  Ask your healthcare provider how to take this medicine safely  · Take your medicine as directed  Contact your healthcare provider if you think your medicine is not helping or if you have side effects  Tell him or her if you are allergic to any medicine  Keep a list of the medicines, vitamins, and herbs you take  Include the amounts, and when and why you take them  Bring the list or the pill bottles to follow-up visits  Carry your medicine list with you in case of an emergency  Follow up with your healthcare provider in 2 weeks, or as directed:  Write down your questions so you remember to ask them during your visits  How to manage your back pain:   · Apply ice  on your back or affected area for 15 to 20 minutes every hour or as directed   Use an ice pack, or put crushed ice in a plastic bag  Cover it with a towel  Ice helps prevent tissue damage and decreases pain  · Apply heat  on your back or affected area for 20 to 30 minutes every 2 hours for as many days as directed  Heat helps decrease pain and muscle spasms  · Stay active  as much as you can without causing more pain  Bed rest could make your back pain worse  Avoid heavy lifting until your pain is gone  Return to the emergency department if:   · You have pain, numbness, or weakness in one or both legs  · Your pain becomes so severe that you cannot walk  · You cannot control your urine or bowel movements  · You have severe back pain with chest pain  · You have severe back pain, nausea, and vomiting  · You have severe back pain that spreads to your side or genital area  Contact your healthcare provider if:   · You have back pain that does not get better with rest and pain medicine  · You have a fever  · You have pain that worsens when you are on your back or when you rest     · You have pain that worsens when you cough or sneeze  · You lose weight without trying  · You have questions or concerns about your condition or care  © 2017 [facility] Information is for End User's use only and may not be sold, redistributed or otherwise used for commercial purposes  All illustrations and images included in CareNotes® are the copyrighted property of [employer]  or Mateusz Rand  The above information is an  only  It is not intended as medical advice for individual conditions or treatments  Talk to your doctor, nurse or pharmacist before following any medical regimen to see if it is safe and effective for you

## 2019-11-21 NOTE — TELEPHONE ENCOUNTER
I have already discussed with the patient that I was not taking over her opioid medications before she was even rescheduled with us when she moved back from [place]  I will not be refilling or assuming responsibility for the patient's opioid medications as I do not feel that they are appropriate for the patient to be on

## 2019-11-22 ENCOUNTER — TELEPHONE (OUTPATIENT)
Dept: INTERNAL MEDICINE CLINIC | Facility: CLINIC | Age: 54
End: 2019-11-22

## 2019-11-22 NOTE — TELEPHONE ENCOUNTER
Patient states pain management will not give her pain medication until she is see 12/3/19    Patient is requesting the following:    Pregabalin (Lyrica) 50 mg  Hydromorphone (Dilaudid) 2 mg  Morphine (Ms Contin)  15 mg    Patient states she was [facility] from 9/19/19 to 11/14/19    Please check into this and call the patient with the decision

## 2019-11-22 NOTE — TELEPHONE ENCOUNTER
I am aware that you are not permitted to prescribe narcotics, but in this situation what is your suggestion?

## 2019-11-25 DIAGNOSIS — M51.16 LUMBAR DISC DISEASE WITH RADICULOPATHY: Primary | ICD-10-CM

## 2019-11-25 DIAGNOSIS — G89.4 CHRONIC PAIN DISORDER: ICD-10-CM

## 2019-11-25 DIAGNOSIS — M54.16 LUMBAR RADICULOPATHY: ICD-10-CM

## 2019-11-25 RX ORDER — MORPHINE SULFATE 15 MG/1
15 TABLET, FILM COATED, EXTENDED RELEASE ORAL 2 TIMES DAILY
Qty: 56 TABLET | Refills: 0 | Status: SHIPPED | OUTPATIENT
Start: 2019-11-25 | End: 2019-11-26 | Stop reason: SDUPTHER

## 2019-11-25 RX ORDER — HYDROMORPHONE HYDROCHLORIDE 2 MG/1
TABLET ORAL
Qty: 77 TABLET | Refills: 0 | Status: SHIPPED | OUTPATIENT
Start: 2019-11-25 | End: 2019-11-26 | Stop reason: SDUPTHER

## 2019-11-25 NOTE — TELEPHONE ENCOUNTER
Correction hydromorphone only comes in 2mg tablets so patient will have to score the tablet and only take half for one of her dose every day for a week  Then the following week half a tablet 2x a day, then following week half a tablet 3x a day and finally last week all 4 dose will be half a tablet

## 2019-11-25 NOTE — TELEPHONE ENCOUNTER
SPOKE WITH PT  SHE IS ASKING IF THERE IS ANYTHING THAT SHE CAN BE GIVEN FOR HER PAIN   SHE SAID IT DOESN'T HAVE TO BE A NARCOTIC BUT SOMETHING STRONGER THAN TYLENOL OR ADVIL

## 2019-11-25 NOTE — TELEPHONE ENCOUNTER
LAST SCRIPTS FROM THIS OFFICE GIVEN 8/23/19 DURING HER APPT  AND IT WAS DISCUSSED THAT PT  SHOULD BE WEANED OFF OF DILAUDID  SHE THEN WENT TO [facility]  THERE IS A TELEPHONE NOTE FROM DR TEJEDA (PAIN MANAGEMENT) ON 11/21/19 STATING HE WILL NOT TAKE OVER HER NARCOTICS AS HE DOES NOT FEEL SHE NEEDS TO BE ON THEM  SHE HAS AN APPT  WITH HIM ON 12/3/19  NOT SURE WHAT WE WANT TO DO IN THIS CASE   PLEASE ADVISE

## 2019-11-25 NOTE — TELEPHONE ENCOUNTER
I saw this patient on Friday  And we discussed she can not get pain medication from different providers  This comes up as a alert in [address] and we were not the ones to prescribe this medication for her and if pain management doctor doesn't feel she needs to be on narcotic we not be taking over

## 2019-11-26 ENCOUNTER — TELEPHONE (OUTPATIENT)
Dept: INTERNAL MEDICINE CLINIC | Facility: CLINIC | Age: 54
End: 2019-11-26

## 2019-11-26 ENCOUNTER — PATIENT OUTREACH (OUTPATIENT)
Dept: INTERNAL MEDICINE CLINIC | Facility: CLINIC | Age: 54
End: 2019-11-26

## 2019-11-26 DIAGNOSIS — G89.4 CHRONIC PAIN DISORDER: ICD-10-CM

## 2019-11-26 DIAGNOSIS — M51.16 LUMBAR DISC DISEASE WITH RADICULOPATHY: ICD-10-CM

## 2019-11-26 DIAGNOSIS — M54.16 LUMBAR RADICULOPATHY: ICD-10-CM

## 2019-11-26 RX ORDER — MORPHINE SULFATE 15 MG/1
15 TABLET, FILM COATED, EXTENDED RELEASE ORAL 2 TIMES DAILY
Qty: 56 TABLET | Refills: 0 | Status: SHIPPED | OUTPATIENT
Start: 2019-11-26 | End: 2019-12-24 | Stop reason: SDUPTHER

## 2019-11-26 RX ORDER — HYDROMORPHONE HYDROCHLORIDE 2 MG/1
TABLET ORAL
Qty: 77 TABLET | Refills: 0 | Status: SHIPPED | OUTPATIENT
Start: 2019-11-26 | End: 2019-11-27 | Stop reason: SDUPTHER

## 2019-11-26 NOTE — TELEPHONE ENCOUNTER
Called patient to make her aware of medication instructions  No answer and requested a call back I left my contact number [phone] and PCP office number [phone]

## 2019-11-26 NOTE — TELEPHONE ENCOUNTER
Communication forwarded to Dr Noman Eddy (personally contacted by phone) who will submit Rx to alternate pharmacy as per request

## 2019-11-26 NOTE — TELEPHONE ENCOUNTER
Radha at [address] [facility] [phone] called to advise that a physical therapist is to go out to evaluate the patient tomorrow 11/27/19

## 2019-11-26 NOTE — PROGRESS NOTES
Outpatient Care Management Note:    I called patient to follow up with her  No answer, message left this is Addy Zendejas the nurse care manager calling from her primary care doctor's office  I requested a call back at [phone]  I also want to review with her medication instructions for her dilaudid and morphine (see telephone encounter from 11/22/19) and remind her of upcoming pain management appointment on 12/3/19

## 2019-11-27 ENCOUNTER — TELEPHONE (OUTPATIENT)
Dept: INTERNAL MEDICINE CLINIC | Facility: CLINIC | Age: 54
End: 2019-11-27

## 2019-11-27 ENCOUNTER — PATIENT OUTREACH (OUTPATIENT)
Dept: INTERNAL MEDICINE CLINIC | Facility: CLINIC | Age: 54
End: 2019-11-27

## 2019-11-27 DIAGNOSIS — M51.16 LUMBAR DISC DISEASE WITH RADICULOPATHY: ICD-10-CM

## 2019-11-27 RX ORDER — HYDROMORPHONE HYDROCHLORIDE 2 MG/1
TABLET ORAL
Qty: 1 TABLET | Refills: 0 | Status: SHIPPED | OUTPATIENT
Start: 2019-11-27 | End: 2019-12-11 | Stop reason: ALTCHOICE

## 2019-11-27 NOTE — PROGRESS NOTES
Outpatient Care Management Note:    Clerical staff received call from Porfirio Kearney,  with the [address] with [facility] called to clarify instructions for patient's Hydromorphone/Dilaudid  Porfirio Kearney reports their program is a community/residential program to help patient's gain life skills and work towards becoming independent out in the community  Patient is on their 24 hour site so the facility dispenses her medication to her and they work with [address] reports the pharmacy dispensed the Dilaudid to them already but cut the 2 mg tablets in half not completely understanding the instructions written by provider  When reviewing instructions for Dilaudid Porfirio Kearney informed me that patient came from [facility] with instructions for Dilaudid 2 mg twice a day as needed  Porfirio Kearney reports patient has been asking for it twice a day  When Dr Austin Unger (attending physician) prescribed the Dilaudid the plan was to titrate her off the dilaudid and have her continue with Morphine 15 mg twice a day for now  Per what [facility] is stating about current dose of Dilaudid PCP office would be prescribing an increased dose to patient when goal is to titrate her off  I spoke with attending physician in office today Dr Elda Gonsalez and explained info above to him  Porfirio Kearney did report on Monday 11/25/19 patient saw psychiatrist with [address] on [facility] in [facility]  Porfirio Kearney reports they do not provide patients with transportation to appointments but they do have an [employer] account to help patients and that is how patient has been getting to her appointments until see receives notice if she is approved or not for [facility]  Patient went for in person evaluated on 11/20/19  Porfirio Kearney did report physical therapist with [address] will be seeing patient this morning  Called [facility] and spoke with Ellyn Casillas   I made her aware of situation with the Dilaudid  She is requesting we send a new script to them with new instructions and can comment on script "do not dispense, this is script for clarification of dosing "     [address]  can be reached at [phone]  Tip Guerrero is aware of patient's pain management appointment with [facility] on 12/3

## 2019-11-27 NOTE — PROGRESS NOTES
I called Julia Tidwell with [facility] and updated her on changes to dilaudid and made her aware I did contact [facility] and new script with clarification of dosing was sent to them

## 2019-11-27 NOTE — TELEPHONE ENCOUNTER
Called [facility] and spoke with Leno Dang  I made her aware of situation with the Dilaudid   She is requesting we send a new script to them with new instructions and can comment on script "do not dispense, this is script for clarification of dosing "

## 2019-11-27 NOTE — TELEPHONE ENCOUNTER
Clerical staff received call from Grant Watson,  with the [address] [facility] with [facility] called to clarify instructions for patient's Hydromorphone/Dilaudid  Grant Watson reports their program is a community/residential program to help patient's gain life skills and work towards becoming independent out in the community  Patient is on their 24 hour site so the facility dispenses her medication to her and they work with [employer]  Grant Watson reports the pharmacy dispensed the Dilaudid to them already but cut the 2 mg tablets in half not completely understanding the instructions written by provider  When reviewing instructions for Dilaudid Grant Watson informed me that patient came from [facility] with instructions for Dilaudid 2 mg twice a day as needed  Grant Watson reports patient has been asking for it twice a day  When Dr Amalia Langley (attending physician) prescribed the Dilaudid the plan was to titrate her off the dilaudid and have her continue with Morphine 15 mg twice a day for now  Per what [facility] is stating about current dose of Dilaudid PCP office would be prescribing an increased dose to patient when goal is to titrate her off  I spoke with attending physician in office today Dr Zayda Nuno and explained info above to him

## 2019-11-27 NOTE — TELEPHONE ENCOUNTER
I called Nam Khan with [facility] and made her aware and reviewed new taper instructions with her

## 2019-12-02 ENCOUNTER — TELEPHONE (OUTPATIENT)
Dept: PAIN MEDICINE | Facility: MEDICAL CENTER | Age: 54
End: 2019-12-02

## 2019-12-02 ENCOUNTER — TELEPHONE (OUTPATIENT)
Dept: INTERNAL MEDICINE CLINIC | Facility: CLINIC | Age: 54
End: 2019-12-02

## 2019-12-02 NOTE — TELEPHONE ENCOUNTER
[address]VD A CALL FROM REAGAN AT [facility] TO INQUIRE ABOUT PTS  CYMBALTA DOSE  HE STATED SHE IS CURRENTLY AT A TRANSITIONAL LIVING FACILITY AND THEY REACHED OUT TO HIM STATING SHE IS ON CYMBALTA 90 MG DAILY AND HE HAS THAT SHE IS ON 60 MG DAILY  SCRIPT FROM DR SERRA THAT WAS SENT ON 11/14/19 WAS 60 MG DAILY  I CALLED THE TRANSITIONAL LIVING AND SPOKE TO BART AND SHE CONFIRMED PT  SAW DR Emory Romero (PSYCHIATRY ) ON 11/25/19 AND HE PRESCRIBED CYMBALTA 30 MG TABS TO TAKE 3 TABS DAILY

## 2019-12-03 ENCOUNTER — OFFICE VISIT (OUTPATIENT)
Dept: PAIN MEDICINE | Facility: CLINIC | Age: 54
End: 2019-12-03
Payer: MEDICARE

## 2019-12-03 ENCOUNTER — TELEPHONE (OUTPATIENT)
Dept: INTERNAL MEDICINE CLINIC | Facility: CLINIC | Age: 54
End: 2019-12-03

## 2019-12-03 ENCOUNTER — TELEPHONE (OUTPATIENT)
Dept: PAIN MEDICINE | Facility: CLINIC | Age: 54
End: 2019-12-03

## 2019-12-03 VITALS
SYSTOLIC BLOOD PRESSURE: 116 MMHG | BODY MASS INDEX: 33.42 KG/M2 | HEIGHT: 61 IN | HEART RATE: 69 BPM | WEIGHT: 177 LBS | DIASTOLIC BLOOD PRESSURE: 85 MMHG

## 2019-12-03 DIAGNOSIS — M54.16 LUMBAR RADICULOPATHY: ICD-10-CM

## 2019-12-03 DIAGNOSIS — M96.1 POST LAMINECTOMY SYNDROME: Primary | ICD-10-CM

## 2019-12-03 DIAGNOSIS — M54.41 CHRONIC BILATERAL LOW BACK PAIN WITH BILATERAL SCIATICA: ICD-10-CM

## 2019-12-03 DIAGNOSIS — G89.4 CHRONIC PAIN SYNDROME: ICD-10-CM

## 2019-12-03 DIAGNOSIS — M48.062 SPINAL STENOSIS OF LUMBAR REGION WITH NEUROGENIC CLAUDICATION: ICD-10-CM

## 2019-12-03 DIAGNOSIS — G89.29 CHRONIC BILATERAL LOW BACK PAIN WITH BILATERAL SCIATICA: ICD-10-CM

## 2019-12-03 DIAGNOSIS — M79.7 FIBROMYALGIA: ICD-10-CM

## 2019-12-03 DIAGNOSIS — Z79.891 ENCOUNTER FOR LONG-TERM USE OF OPIATE ANALGESIC: ICD-10-CM

## 2019-12-03 DIAGNOSIS — M54.42 CHRONIC BILATERAL LOW BACK PAIN WITH BILATERAL SCIATICA: ICD-10-CM

## 2019-12-03 PROCEDURE — 99214 OFFICE O/P EST MOD 30 MIN: CPT | Performed by: NURSE PRACTITIONER

## 2019-12-03 RX ORDER — TRAZODONE HYDROCHLORIDE 50 MG/1
50 TABLET ORAL
COMMUNITY
Start: 2019-11-25 | End: 2020-01-14 | Stop reason: ALTCHOICE

## 2019-12-03 RX ORDER — LORAZEPAM 0.5 MG/1
0.5 TABLET ORAL EVERY 8 HOURS PRN
Status: ON HOLD | COMMUNITY
Start: 2019-11-25 | End: 2020-09-09 | Stop reason: SDUPTHER

## 2019-12-03 RX ORDER — CETIRIZINE HYDROCHLORIDE 10 MG/1
TABLET ORAL
Refills: 1 | COMMUNITY
Start: 2019-10-21 | End: 2020-03-19 | Stop reason: HOSPADM

## 2019-12-03 RX ORDER — PREGABALIN 75 MG/1
75 CAPSULE ORAL 3 TIMES DAILY
Qty: 90 CAPSULE | Refills: 1 | Status: SHIPPED | OUTPATIENT
Start: 2019-12-03 | End: 2020-01-30

## 2019-12-03 NOTE — PROGRESS NOTES
Assessment:  1  Post laminectomy syndrome    2  Lumbar radiculopathy    3  Chronic bilateral low back pain with bilateral sciatica    4  Fibromyalgia    5  Spinal stenosis of lumbar region with neurogenic claudication    6  Chronic pain syndrome    7  Encounter for long-term use of opiate analgesic        Plan:  1  I will reach out to Neurosurgery to see if they would be willing to see her to discuss retrialing a spinal cord stimulator  I will be in touch with her once I hear back  I did explain to the patient that this would be the best option for controlling her long-term chronic pain complaints  2  Opioids will be per PCP, this office will not be taking over prescribing  We do recommend that she continue to substantially wean down if not completely off of opioid medications altogether and I will try the optimize non opioid medications as much as possible  She is currently being weaned off of dilaudid and is also on Morphine ER 15mg BID by her PCP  I also discussed that this is in her best interest considering she is currently taking lorazepam and there is a black box warning on concurrent use of opioid medications and benzodiazepines  3  I will increase Lyrica to 75 mg t i d  For her ongoing pain complaints  I advised the patient that if they experience any side effects or issues with the changes in their medication regiment, they should give our office a call to discuss  I also advised the patient not to drive or operate machinery until they see how the changes in the medication regimen affects them  The patient was agreeable and verbalized an understanding  4  The patient may continue duloxetine, cyclobenzaprine, and meloxicam as prescribed  5  The patient should continue with physical therapy and her home exercise program  6  The patient will follow-up in 8 weeks for medication prescription refill and reevaluation   The patient was advised to contact the office should their symptoms worsen in the interim  The patient was agreeable and verbalized an understanding  [place] Prescription Drug Monitoring Program report was reviewed and was appropriate     M*Modal software was used to dictate this note  It may contain errors with dictating incorrect words or incorrect spelling  Please contact the provider directly with any questions  History of Present Illness: The patient is a 52 y o  female with a history of T7-S1 fusion for scoliosis correction as well as subsequent C4-5 ACDF, fibromyalgia, lumbar degenerative disc disease, lumbar spondylosis and stenosis, chronic pain syndrome, last seen on 10/7/19 who presents for a follow up office visit  The patient did recently moved back to [place] from [place] where she had these to surgeries completed  She also states that she did undergo a spinal cord stimulator trial, which was successful, however had to move back to [place] before she can move forward with the permanent implant  We do not have records of her spinal cord stimulator trial, the patient states she has been reaching out to her pain management office in [place] and has not been successful and obtaining these records as well  She states that she has tried to schedule an office visit with Neurosurgery, however states they they were unwilling to schedule her until they received the spinal cord stimulator trial office notes  She has tried and failed epidural steroid injections, SI joint injections, and has now had an and extension thoracolumbar fusion without any significant relief  She is also currently being managed on high-dose opioid medications by her PCP including Dilaudid 2 mg 1/2 tab q12 hours which is in the process of being weaned to discontinuation, and morphine ER 15 mg b i d and is also being prescribed lorazepam      The patient currently rates her pain an 8/10 on the numeric pain rating scale    She states her pain is constant nature and bothersome the morning and at night  She characterizes the pain as dull aching, throbbing, pressure-like and numbness  Current pain medications includes:  Dilaudid 2 mg 1/2 tablet which is currently being weaned to discontinuation by her PCP, MS Contin 15 mg q 12 hours as prescribed by her PCP, duloxetine 60 mg daily, Lyrica 50 mg t i d , and meloxicam 7 5 mg daily p r n     The patient reports that this regimen is providing 30% pain relief  The patient is reporting no side effects from this pain medication regimen  I have personally reviewed and/or updated the patient's past medical history, past surgical history, family history, social history, current medications, allergies, and vital signs today  Review of Systems:    Review of Systems   Respiratory: Negative for shortness of breath  Cardiovascular: Negative for chest pain  Gastrointestinal: Positive for constipation  Negative for diarrhea, nausea and vomiting  Musculoskeletal: Positive for gait problem  Negative for arthralgias, joint swelling and myalgias  Skin: Negative for rash  Neurological: Positive for weakness  Negative for dizziness and seizures  All other systems reviewed and are negative  Past Medical History:   Diagnosis Date    Acid reflux     Anxiety     RESOLVED: [id number]    Arthritis     Bipolar 2 disorder (HCC)     FOLLOWS WITH PSYCHIATRIST  CONTINUE LAMOTRIGINE; RESOLVED: [id number]    Depression     Familial tremor     both hands    Fibromyalgia     LAST ASSESSED: [id number]    Hearing aid worn     left ear    [place] (hard of hearing)     left ear    Hypertension     Left-sided weakness     Lower back pain     Memory loss of unknown cause     long and short term    Migraine     Overactive bladder     Panic attack     Post traumatic stress disorder     Seasonal allergies     Stroke [facility])     questionable stroke 2009    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    CONT ASA 81 MG DAILY; RESOLVED: [id number]  Urinary incontinence     Wears dentures     partial lower / full upper    Wears glasses        Past Surgical History:   Procedure Laterality Date    BACK SURGERY       SECTION      COLONOSCOPY      RESOLVED: [id number]    EAR SURGERY      HYSTERECTOMY  2004    MYRINGOTOMY W/ TUBES Left     NECK SURGERY  2019    MS CYSTOURETHROSCOPY N/A 2016    Procedure: CYSTOSCOPY, BOTOX INJECTION;  Surgeon: Lakesha Starr MD;  Location: [facility];  Service: Gynecology    TONSILLECTOMY      TUBAL LIGATION         Family History   Problem Relation Age of Onset    Colon cancer Mother     Alzheimer's disease Father     Stroke Father     Colon cancer Brother     Bipolar disorder Brother     Breast cancer Maternal Aunt     Heart disease Other     Diabetes Other     Hypertension Other     Seizures Son        Social History     Occupational History    Occupation: Disabled   Tobacco Use    Smoking status: Never Smoker    Smokeless tobacco: Never Used   Substance and Sexual Activity    Alcohol use: No     Comment: 2 x year; being a social drinker as per all scripts     Drug use: No    Sexual activity: Not on file         Current Outpatient Medications:     amLODIPine (NORVASC) 5 mg tablet, Take 1 tablet (5 mg total) by mouth daily, Disp: 60 tablet, Rfl: 0    aspirin 81 MG tablet, Take 1 tablet (81 mg total) by mouth daily, Disp: 30 tablet, Rfl: 3    busPIRone (BUSPAR) 5 mg tablet, TAKE 1 TABLET (5 MG TOTAL) BY MOUTH 3 (THREE) TIMES A DAY, Disp: 90 tablet, Rfl: 0    cetirizine (ZyrTEC) 10 mg tablet, , Disp: , Rfl: 1    Cholecalciferol (VITAMIN D3) 1000 units CAPS, Take 1 capsule (1,000 Units total) by mouth daily, Disp: 90 capsule, Rfl: 0    DULoxetine (CYMBALTA) 60 mg delayed release capsule, Take 1 capsule (60 mg total) by mouth daily, Disp: 90 capsule, Rfl: 0    HYDROmorphone (DILAUDID) 2 mg tablet, 1/2 pill q12 hrs x 1 week, then 1/2 pill daily in am x 1 week, then stop  Please discard any remaining pills, Disp: 1 tablet, Rfl: 0    hydrOXYzine HCL (ATARAX) 25 mg tablet, Take 25 mg by mouth every 8 (eight) hours as needed, Disp: , Rfl:     LORazepam (ATIVAN) 0 5 mg tablet, Take 0 5 mg by mouth every 8 (eight) hours as needed, Disp: , Rfl:     lubiprostone (AMITIZA) 24 mcg capsule, Take 1 capsule (24 mcg total) by mouth 2 (two) times a day with meals, Disp: 30 capsule, Rfl: 3    meloxicam (MOBIC) 7 5 mg tablet, Take 7 5 mg by mouth daily, Disp: , Rfl:     morphine (MS CONTIN) 15 mg 12 hr tablet, Take 1 tablet (15 mg total) by mouth 2 (two) times a dayMax Daily Amount: 30 mg, Disp: 56 tablet, Rfl: 0    omeprazole (PriLOSEC) 20 mg delayed release capsule, Take 1 capsule (20 mg total) by mouth daily, Disp: 60 capsule, Rfl: 0    oxybutynin (DITROPAN-XL) 10 MG 24 hr tablet, Take 1 tablet (10 mg total) by mouth daily at bedtime, Disp: 90 tablet, Rfl: 0    pregabalin (LYRICA) 75 mg capsule, Take 1 capsule (75 mg total) by mouth 3 (three) times a day, Disp: 90 capsule, Rfl: 1    primidone (MYSOLINE) 250 mg tablet, Take 1 tablet (250 mg total) by mouth daily at bedtime, Disp: 90 tablet, Rfl: 0    propranolol (INDERAL) 20 mg tablet, Take 1 tablet (20 mg total) by mouth 2 (two) times a day, Disp: 120 tablet, Rfl: 0    QUEtiapine (SEROquel XR) 300 mg 24 hr tablet, Take 1 tablet (300 mg total) by mouth daily at bedtime, Disp: 60 tablet, Rfl: 1    tamsulosin (FLOMAX) 0 4 mg, Take 1 capsule (0 4 mg total) by mouth daily with dinner, Disp: 60 capsule, Rfl: 1    traZODone (DESYREL) 50 mg tablet, Take 50 mg by mouth, Disp: , Rfl:     Valbenazine Tosylate 80 MG CAPS, Take 80 mg by mouth daily, Disp: , Rfl:     No Known Allergies    Physical Exam:    /85   Pulse 69   Ht 5' 1" (1 549 m)   Wt 80 3 kg (177 lb)   BMI 33 44 kg/m²     Constitutional:normal, well developed, well nourished, alert, in no distress and non-toxic and no overt pain behavior  Eyes:anicteric  HEENT:grossly intact  Neck:supple, symmetric, trachea midline and no masses   Pulmonary:even and unlabored  Cardiovascular:No edema or pitting edema present  Skin:Normal without rashes or lesions and well hydrated  Psychiatric:Mood and affect appropriate  Neurologic:Cranial Nerves II-XII grossly intact  Musculoskeletal:in wheelchair      Imaging  No orders to display         No orders of the defined types were placed in this encounter

## 2019-12-03 NOTE — TELEPHONE ENCOUNTER
Form From [facility], [facility] for Duloxetine need to be signed and faxed back to [phone]  Form placed in [address] [place] [address]

## 2019-12-03 NOTE — TELEPHONE ENCOUNTER
Can we please call this patient and let her know that I did reach out to neurosurgery as I said I would in our office visit and they are agreeable to see her to discuss a possible repeat SCS trial  Please have her call them to schedule  If she lost her referral, please print it out (10/7/19 encounter from Tommy) and mail it to her  Thanks!    ----- Message from Peng Abraham MD sent at 12/3/2019 10:41 AM EST -----  Sure that sounds reasonable      ----- Message -----  From: MERCY Byers  Sent: 12/3/2019  10:34 AM EST  To: Peng Abraham MD    Good morning Dr Cresencio Love,  This patient recently moved back to [place] from [place] where she had T7-S1 fusion and C4-5 ACDF  She continues in pain  We had referred this patient to you to discuss spinal cord stimulation  She apparently had a trial in [place], however moved back to [place] before undergoing permanent implant  She states she is unable to obtain records from her stim trial, and therefore your office would not see her   Would you possibly consider consulting with her to discuss a retrial?     Thanks,  Bianka Alejandra

## 2019-12-03 NOTE — TELEPHONE ENCOUNTER
[facility],   Received an incoming call for Jarret Galicia (NICK) from [address] [place] [address]  ([phone]) regarding patient  Jarret Galicia called with an update that patient received a prescription for dilaudid  from Dr Keyanna Maloney on 11/27/19  Patient is currently being titrated down and should be through by 12/11/19 and will not be taking it anymore  Patient is out of Lyrica, but has plenty of MS Contin left  Patient also received a prescription from her psychiatrist for lorazepam   Jarret Galicia said her physical therapist stated the patient is just laying around and not doing anything and that is why she is in so much pain and stiff

## 2019-12-03 NOTE — TELEPHONE ENCOUNTER
Aware, thank you  This office will not be taking over opioid prescribing and will be optimizing non-opioid therapy for her chronic pain complaints  I did adjust her Lyrica dosing at today's office visit which was electronically sent to the pharmacy  She should continue in PT  Thank you

## 2019-12-09 ENCOUNTER — TELEPHONE (OUTPATIENT)
Dept: INTERNAL MEDICINE CLINIC | Facility: CLINIC | Age: 54
End: 2019-12-09

## 2019-12-11 ENCOUNTER — OFFICE VISIT (OUTPATIENT)
Dept: INTERNAL MEDICINE CLINIC | Facility: CLINIC | Age: 54
End: 2019-12-11

## 2019-12-11 ENCOUNTER — TELEPHONE (OUTPATIENT)
Dept: INTERNAL MEDICINE CLINIC | Facility: CLINIC | Age: 54
End: 2019-12-11

## 2019-12-11 ENCOUNTER — PATIENT OUTREACH (OUTPATIENT)
Dept: INTERNAL MEDICINE CLINIC | Facility: CLINIC | Age: 54
End: 2019-12-11

## 2019-12-11 VITALS
HEART RATE: 64 BPM | BODY MASS INDEX: 34.91 KG/M2 | TEMPERATURE: 97.9 F | WEIGHT: 184.75 LBS | SYSTOLIC BLOOD PRESSURE: 120 MMHG | DIASTOLIC BLOOD PRESSURE: 90 MMHG | OXYGEN SATURATION: 99 %

## 2019-12-11 DIAGNOSIS — N30.00 ACUTE CYSTITIS WITHOUT HEMATURIA: ICD-10-CM

## 2019-12-11 DIAGNOSIS — Z12.39 BREAST CANCER SCREENING: ICD-10-CM

## 2019-12-11 DIAGNOSIS — R35.0 URINARY FREQUENCY: ICD-10-CM

## 2019-12-11 DIAGNOSIS — Z76.89 ENCOUNTER FOR WEIGHT MANAGEMENT: ICD-10-CM

## 2019-12-11 DIAGNOSIS — Z12.11 COLON CANCER SCREENING: Primary | ICD-10-CM

## 2019-12-11 PROBLEM — Z80.0 FAMILY HISTORY OF COLORECTAL CANCER: Status: ACTIVE | Noted: 2019-12-11

## 2019-12-11 LAB
SL AMB  POCT GLUCOSE, UA: NEGATIVE
SL AMB LEUKOCYTE ESTERASE,UA: NORMAL
SL AMB POCT BILIRUBIN,UA: NEGATIVE
SL AMB POCT BLOOD,UA: NORMAL
SL AMB POCT CLARITY,UA: CLEAR
SL AMB POCT COLOR,UA: YELLOW
SL AMB POCT KETONES,UA: NEGATIVE
SL AMB POCT NITRITE,UA: NEGATIVE
SL AMB POCT PH,UA: 6.5
SL AMB POCT SPECIFIC GRAVITY,UA: 1
SL AMB POCT URINE PROTEIN: NEGATIVE
SL AMB POCT UROBILINOGEN: 0.2

## 2019-12-11 PROCEDURE — 81002 URINALYSIS NONAUTO W/O SCOPE: CPT | Performed by: INTERNAL MEDICINE

## 2019-12-11 PROCEDURE — 99213 OFFICE O/P EST LOW 20 MIN: CPT | Performed by: INTERNAL MEDICINE

## 2019-12-11 RX ORDER — ACETAMINOPHEN 500 MG
500 TABLET ORAL EVERY 6 HOURS PRN
COMMUNITY
End: 2020-03-27 | Stop reason: SDUPTHER

## 2019-12-11 RX ORDER — SENNA AND DOCUSATE SODIUM 50; 8.6 MG/1; MG/1
1 TABLET, FILM COATED ORAL DAILY
COMMUNITY
End: 2020-07-20

## 2019-12-11 RX ORDER — POLYETHYLENE GLYCOL 3350 17 G/17G
POWDER, FOR SOLUTION ORAL
COMMUNITY
End: 2020-03-19 | Stop reason: HOSPADM

## 2019-12-11 RX ORDER — SULFAMETHOXAZOLE AND TRIMETHOPRIM 800; 160 MG/1; MG/1
1 TABLET ORAL EVERY 12 HOURS SCHEDULED
Qty: 8 TABLET | Refills: 0 | Status: SHIPPED | OUTPATIENT
Start: 2019-12-11 | End: 2019-12-11

## 2019-12-11 RX ORDER — DULOXETIN HYDROCHLORIDE 30 MG/1
30 CAPSULE, DELAYED RELEASE ORAL DAILY
COMMUNITY
End: 2021-03-17 | Stop reason: ALTCHOICE

## 2019-12-11 RX ORDER — LACTULOSE 10 G/10G
10 SOLUTION ORAL 3 TIMES DAILY
COMMUNITY
End: 2020-03-19 | Stop reason: HOSPADM

## 2019-12-11 RX ORDER — SULFAMETHOXAZOLE AND TRIMETHOPRIM 800; 160 MG/1; MG/1
1 TABLET ORAL EVERY 12 HOURS SCHEDULED
Qty: 8 TABLET | Refills: 0 | Status: SHIPPED | OUTPATIENT
Start: 2019-12-11 | End: 2019-12-13

## 2019-12-11 NOTE — PROGRESS NOTES
Outpatient Care Management Note:    Patient is at PCP office today  I briefly met with patient when she was leaving  Patient reports she is doing well and recently follow up with the pain management office  Patient's medication is still being controlled by the transitional facility she is at  [facility] and used this service today to get to her appointment  Patient does not have any further questions, concerns, or other needs at this time  Pt has my contact # and PCP office # if needed  Pt is agreeable for further outreach  Please see physician note for more info

## 2019-12-11 NOTE — TELEPHONE ENCOUNTER
Done, Faxed to [facility], [facility], Scanned copy in patients chart

## 2019-12-11 NOTE — PROGRESS NOTES
[facility]  INTERNAL MEDICINE OFFICE VISIT     PATIENT INFORMATION     Samantha Evans Petit   52 y o  female   MRN: [id number]    ASSESSMENT/PLAN       1  UTI  Pt complained of increased urinary frequency, abdominal pain and bad smell to her urine  No fever  Admits to chills  Urine dipstick showed positive for leukocytes and trace blood  Admits that her symptoms are similar to UTI episode aboue a year ago  Plan:  · Will treat with Bactrim 800-160 mg Q12 hours for 4 days  · Pt was asked to monitor for any worsening of symptoms or development to of new symptoms including fever       2  Colon cancer screening  Pt has positive family history of colon cancer (mother diagnosed at age 64, brother at 62 and maternal aunt)  Colonoscopy in 2014 showed single rectal polyp  Pathology report showed hyperplastic polyp  5 year follow up was recommended at that time for repeat colonoscopy  Plan:  · Will refer to GI for colonoscopy       3  Breast cancer screening: referral for mammogram         -- Consider for PAP smear during  Next visit    Diagnoses and all orders for this visit:    Colon cancer screening  -     Ambulatory referral to Gastroenterology; Future    Breast cancer screening  -     Mammo screening bilateral w 3d & cad; Future    Urinary frequency  -     POCT urine dip    Acute cystitis without hematuria  -     Discontinue: sulfamethoxazole-trimethoprim (BACTRIM DS) 800-160 mg per tablet; Take 1 tablet by mouth every 12 (twelve) hours for 4 days  -     sulfamethoxazole-trimethoprim (BACTRIM DS) 800-160 mg per tablet; Take 1 tablet by mouth every 12 (twelve) hours for 4 days    Other orders  -     acetaminophen (TYLENOL) 500 mg tablet; Take 500 mg by mouth every 6 (six) hours as needed for mild pain  -     DULoxetine (CYMBALTA) 30 mg delayed release capsule; Take 30 mg by mouth daily  -     lactulose (CEPHULAC) 10 g packet;  Take 10 g by mouth 3 (three) times a day  -     polyethylene glycol (MIRALAX) 17 g; Take by mouth  -     senna-docusate sodium (SENOKOT-S) 8 6-50 mg per tablet; Take 1 tablet by mouth daily      Schedule a follow-up appointment in 2 months  HEALTH MAINTENANCE     Immunization History   Administered Date(s) Administered    Influenza TIV (IM) 10/02/2013, 11/20/2014, 10/26/2015    Tdap 07/25/2014     Immunizations:  · None this visit  Screening:  · none    CHIEF COMPLAINT     Chief Complaint   Patient presents with   Coretha Lava     pt feels like she has an infection in her bladder, or abdomen pts abdome is swollen very pain painful       HISTORY OF PRESENT ILLNESS     This is 52 wheelchair bound F with PMH of lumbar radiculopathy, bipolar disorder, panic attacks came to clinic with chief complaint of abdominal pain and increase urinary frequency  Pt admits that she symptoms started about 1 week ago  She has noticed increased urinary frequency and foul smell to her urine  She denied any systemic symptoms including fever  Admits to some chills  Admits that her symptoms are similar to her previous UTI about a year ago  Denies any flank pain or hematuria  Pt denied any other symptoms including chest pain, SOB, headache, dizziness, hematuria, melena, constipation  Admits to regular bowel movements  Pt has positive family history of colon cancer (mother diagnosed at age 64, brother at 62 and maternal aunt)  Colonoscopy in 2014 showed single rectal polyp  Pathology report showed hyperplastic polyp  5 year follow up was recommended at that time for repeat colonoscopy  Pt was referred to GI during last office visit but she did not go for it  Pt was also referred for PAP smear and mammogram which she did not go for  REVIEW OF SYSTEMS     Review of Systems   Constitutional: Negative for chills, fatigue and fever  HENT: Negative for congestion, rhinorrhea, sinus pressure and sinus pain  Respiratory: Negative for apnea, cough, shortness of breath and wheezing  Cardiovascular: Negative for chest pain, palpitations and leg swelling  Gastrointestinal: Negative for abdominal distention, abdominal pain, constipation, diarrhea and nausea  Endocrine: Negative for cold intolerance, polydipsia and polyphagia  Genitourinary: Positive for frequency and urgency  Musculoskeletal: Negative for arthralgias, back pain, joint swelling and myalgias  Skin: Negative for color change, rash and wound  Neurological: Negative for dizziness, seizures, weakness, light-headedness, numbness and headaches  Psychiatric/Behavioral: Negative for agitation and hallucinations  The patient is not nervous/anxious  OBJECTIVE     Vitals:    12/11/19 1004   BP: 120/90   BP Location: Left arm   Patient Position: Sitting   Cuff Size: Adult   Pulse: 64   Temp: 97 9 °F (36 6 °C)   TempSrc: Oral   SpO2: 99%   Weight: 83 8 kg (184 lb 11 9 oz)     Physical Exam   Constitutional: She is oriented to person, place, and time  She appears well-developed and well-nourished  No distress  HENT:   Head: Normocephalic and atraumatic  Nose: Nose normal    Mouth/Throat: No oropharyngeal exudate  Eyes: Conjunctivae are normal  No scleral icterus  Neck: Neck supple  No JVD present  No tracheal deviation present  No thyromegaly present  Cardiovascular: Normal rate, regular rhythm, normal heart sounds and intact distal pulses  Exam reveals no gallop and no friction rub  No murmur heard  Pulmonary/Chest: Effort normal and breath sounds normal  No stridor  No respiratory distress  She has no wheezes  She has no rales  She exhibits no tenderness  Abdominal: Soft  Bowel sounds are normal  She exhibits no distension and no mass  There is no tenderness  There is no rebound and no guarding  Musculoskeletal: Normal range of motion  She exhibits no tenderness  Neurological: She is alert and oriented to person, place, and time  No sensory deficit  Skin: Skin is warm  No rash noted   She is not diaphoretic  No erythema  Psychiatric: She has a normal mood and affect  Her behavior is normal  Judgment and thought content normal    Vitals reviewed  CURRENT MEDICATIONS     Current Outpatient Medications:     acetaminophen (TYLENOL) 500 mg tablet, Take 500 mg by mouth every 6 (six) hours as needed for mild pain, Disp: , Rfl:     amLODIPine (NORVASC) 5 mg tablet, Take 1 tablet (5 mg total) by mouth daily, Disp: 60 tablet, Rfl: 0    aspirin 81 MG tablet, Take 1 tablet (81 mg total) by mouth daily, Disp: 30 tablet, Rfl: 3    busPIRone (BUSPAR) 5 mg tablet, TAKE 1 TABLET (5 MG TOTAL) BY MOUTH 3 (THREE) TIMES A DAY, Disp: 90 tablet, Rfl: 0    cetirizine (ZyrTEC) 10 mg tablet, , Disp: , Rfl: 1    Cholecalciferol (VITAMIN D3) 1000 units CAPS, Take 1 capsule (1,000 Units total) by mouth daily, Disp: 90 capsule, Rfl: 0    DULoxetine (CYMBALTA) 30 mg delayed release capsule, Take 30 mg by mouth daily, Disp: , Rfl:     DULoxetine (CYMBALTA) 60 mg delayed release capsule, Take 1 capsule (60 mg total) by mouth daily, Disp: 90 capsule, Rfl: 0    HYDROmorphone (DILAUDID) 2 mg tablet, 1/2 pill q12 hrs x 1 week, then 1/2 pill daily in am x 1 week, then stop   Please discard any remaining pills, Disp: 1 tablet, Rfl: 0    hydrOXYzine HCL (ATARAX) 25 mg tablet, Take 25 mg by mouth every 8 (eight) hours as needed, Disp: , Rfl:     lactulose (CEPHULAC) 10 g packet, Take 10 g by mouth 3 (three) times a day, Disp: , Rfl:     LORazepam (ATIVAN) 0 5 mg tablet, Take 0 5 mg by mouth every 8 (eight) hours as needed, Disp: , Rfl:     lubiprostone (AMITIZA) 24 mcg capsule, Take 1 capsule (24 mcg total) by mouth 2 (two) times a day with meals, Disp: 30 capsule, Rfl: 3    meloxicam (MOBIC) 7 5 mg tablet, Take 15 mg by mouth daily , Disp: , Rfl:     morphine (MS CONTIN) 15 mg 12 hr tablet, Take 1 tablet (15 mg total) by mouth 2 (two) times a dayMax Daily Amount: 30 mg, Disp: 56 tablet, Rfl: 0    omeprazole (PriLOSEC) 20 mg delayed release capsule, Take 1 capsule (20 mg total) by mouth daily, Disp: 60 capsule, Rfl: 0    oxybutynin (DITROPAN-XL) 10 MG 24 hr tablet, Take 1 tablet (10 mg total) by mouth daily at bedtime, Disp: 90 tablet, Rfl: 0    polyethylene glycol (MIRALAX) 17 g, Take by mouth, Disp: , Rfl:     pregabalin (LYRICA) 75 mg capsule, Take 1 capsule (75 mg total) by mouth 3 (three) times a day, Disp: 90 capsule, Rfl: 1    primidone (MYSOLINE) 250 mg tablet, Take 1 tablet (250 mg total) by mouth daily at bedtime, Disp: 90 tablet, Rfl: 0    propranolol (INDERAL) 20 mg tablet, Take 1 tablet (20 mg total) by mouth 2 (two) times a day, Disp: 120 tablet, Rfl: 0    QUEtiapine (SEROquel XR) 300 mg 24 hr tablet, Take 1 tablet (300 mg total) by mouth daily at bedtime, Disp: 60 tablet, Rfl: 1    senna-docusate sodium (SENOKOT-S) 8 6-50 mg per tablet, Take 1 tablet by mouth daily, Disp: , Rfl:     tamsulosin (FLOMAX) 0 4 mg, Take 1 capsule (0 4 mg total) by mouth daily with dinner, Disp: 60 capsule, Rfl: 1    traZODone (DESYREL) 50 mg tablet, Take 50 mg by mouth, Disp: , Rfl:     Valbenazine Tosylate 80 MG CAPS, Take 80 mg by mouth daily, Disp: , Rfl:     sulfamethoxazole-trimethoprim (BACTRIM DS) 800-160 mg per tablet, Take 1 tablet by mouth every 12 (twelve) hours for 4 days, Disp: 8 tablet, Rfl: 0    PAST MEDICAL & SURGICAL HISTORY     Past Medical History:   Diagnosis Date    Acid reflux     Anxiety     RESOLVED: [id number]    Arthritis     Bipolar 2 disorder (HCC)     FOLLOWS WITH PSYCHIATRIST   CONTINUE LAMOTRIGINE; RESOLVED: [id number]    Depression     Familial tremor     both hands    Fibromyalgia     LAST ASSESSED: [id number]    Hearing aid worn     left ear    [place] (hard of hearing)     left ear    Hypertension     Left-sided weakness     Lower back pain     Memory loss of unknown cause     long and short term    Migraine     Overactive bladder     Panic attack     Post traumatic stress disorder     Seasonal allergies     Stroke [facility])     questionable stroke 2009    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    CONT ASA 81 MG DAILY; RESOLVED: [id number]    Urinary incontinence     Wears dentures     partial lower / full upper    Wears glasses      Past Surgical History:   Procedure Laterality Date    BACK SURGERY       SECTION      COLONOSCOPY      RESOLVED: [id number]    EAR SURGERY      HYSTERECTOMY  2004    MYRINGOTOMY W/ TUBES Left     NECK SURGERY  2019    CA CYSTOURETHROSCOPY N/A 2016    Procedure: CYSTOSCOPY, BOTOX INJECTION;  Surgeon: Caroline Scanlon MD;  Location: [facility];  Service: Gynecology    TONSILLECTOMY      TUBAL LIGATION       SOCIAL & FAMILY HISTORY     Social History     Socioeconomic History    Marital status:      Spouse name: Not on file    Number of children: 2    Years of education: graduate school     Highest education level: Not on file   Occupational History    Occupation: Disabled   Social Needs    Financial resource strain: Somewhat hard    Food insecurity:     Worry: Not on file     Inability: Not on file   [facility] medical needs:     Medical: Not on file     Non-medical: Not on file   Tobacco Use    Smoking status: Never Smoker    Smokeless tobacco: Never Used   Substance and Sexual Activity    Alcohol use: No     Comment: 2 x year; being a social drinker as per all scripts     Drug use: No    Sexual activity: Not on file   Lifestyle    Physical activity:     Days per week: Not on file     Minutes per session: Not on file    Stress: Not on file   Relationships    Social connections:     Talks on phone: Not on file     Gets together: Not on file     Attends Congregational service: Not on file     Active member of club or organization: Not on file     Attends meetings of clubs or organizations: Not on file     Relationship status: Not on file    Intimate partner violence:     Fear of current or ex partner: Not on file     Emotionally abused: Not on file     Physically abused: Not on file     Forced sexual activity: Not on file   Other Topics Concern    Not on file   Social History Narrative    Bereavement    Daily caffeine consumption, 6-8 servings per day     as per all scripts    Lives in [address] [place] [address] Road History     Substance and Sexual Activity   Alcohol Use No    Comment: 2 x year; being a social drinker as per all scripts      Social History     Substance and Sexual Activity   Drug Use No     Social History     Tobacco Use   Smoking Status Never Smoker   Smokeless Tobacco Never Used     Family History   Problem Relation Age of Onset    Colon cancer Mother     Alzheimer's disease Father     Stroke Father     Colon cancer Brother     Bipolar disorder Brother     Breast cancer Maternal Aunt     Heart disease Other     Diabetes Other     Hypertension Other     Seizures Son      ==  Adriane Cordero,   PGY-1  [facility]  [address]   [facility] , [address]  Office: [phone]  Fax: [phone]

## 2019-12-12 ENCOUNTER — TELEPHONE (OUTPATIENT)
Dept: PAIN MEDICINE | Facility: CLINIC | Age: 54
End: 2019-12-12

## 2019-12-12 NOTE — PROGRESS NOTES
SW has met with pt at PCP visit this date sn pt appears to be doing very well  She is very happy to be out of the Nursing Home   She is in an apt with Transitional [facility]  She receives 24 hour supervision and  [address] from them ( [address] [place] [address]) as well  She can be at this location for a year and there maybe additional options there after that as well  Pt will continue to pursue housing in the community as well through their help  She is on section 8 list Please visit www  [employer] for a medication discount voucher  She also has Ryan Mcneil  No further SW intervention indicated from this worker

## 2019-12-12 NOTE — TELEPHONE ENCOUNTER
Jami Kevin, I received an email from Matt from Neurosurgery about obtaining records for stimulator  I spoke with patient and she said they have been trying to obtain records and to no avail so she is not going to sign another release of info  Patient saying she just needs a new trial  Does she need to be an open trial? Thanks!

## 2019-12-13 ENCOUNTER — TELEPHONE (OUTPATIENT)
Dept: INTERNAL MEDICINE CLINIC | Facility: CLINIC | Age: 54
End: 2019-12-13

## 2019-12-13 RX ORDER — SULFAMETHOXAZOLE AND TRIMETHOPRIM 800; 160 MG/1; MG/1
1 TABLET ORAL EVERY 12 HOURS SCHEDULED
Qty: 10 TABLET | Refills: 0 | Status: SHIPPED | OUTPATIENT
Start: 2019-12-13 | End: 2019-12-18

## 2019-12-13 NOTE — TELEPHONE ENCOUNTER
I had extensive discussion with patient regarding this  We explored different options (diet and exercise) regarding this issue  She agreed that she will focus on her diet and exercise  She understood that we can not prescribe weight loss pills at this time  Please call patient and let her know that we can not prescribe weight loss medication   If she would like, we can refer her to weight management program

## 2019-12-13 NOTE — TELEPHONE ENCOUNTER
Patient called today and states her prescription for   sulfamethoxazole-trimethoprim (BACTRIM DS) 800-160 mg per tablet [[id number]]      Pharmacy:  [facility], [address] [place] [address] Phone:  [phone] Fax:  [phone]    Address:  [address] [place] [address], [address]  [address] [facility] #:  [id number]          Should of went to Eddie 'BLANCA'  ( its on file ) and it went to [address]  Latrell Jordyn will not transfer it so can this be fixed?

## 2019-12-13 NOTE — TELEPHONE ENCOUNTER
This medication was not sent electronically, unless she herself handed in a prescription  Please clarify with patient

## 2019-12-13 NOTE — TELEPHONE ENCOUNTER
Patient called back and stated no she did not get a paper script  Can we please send this to the correct pharmacy?      [facility]

## 2019-12-13 NOTE — TELEPHONE ENCOUNTER
Patient called back and said she has gained 50lbs in the past few months  She is requesting pills for weight loss  I informed her we do not do this but we can possibly get her a referral to weight management but she insisted that [facility] said that they would give her a prescription for something to help her  She would like that called in   ( She did not have a name of the medication, States the doctor has a few ideas)

## 2019-12-17 NOTE — TELEPHONE ENCOUNTER
Patient called back and was made aware that a weight loss medication would not be ordered and that (diet and exercise) where part of the options and also weight los management referral  Patient stated that she would like a referral to Weight loss management

## 2019-12-17 NOTE — TELEPHONE ENCOUNTER
Sure  I will put order for weight loss management  Please mail the referral paper to the patient  Thanks

## 2019-12-19 ENCOUNTER — TELEPHONE (OUTPATIENT)
Dept: INTERNAL MEDICINE CLINIC | Facility: CLINIC | Age: 54
End: 2019-12-19

## 2019-12-19 NOTE — TELEPHONE ENCOUNTER
Spoke to patient, she said she has had a headache for a few days  Her BP was high on mon  12/16/19 and today 12/19/19  She denies any other symptoms  She said she is on BP medication and has not missed any doses  I scheduled her an appointment for tomorrow 12/20/19 as she does not have transportation for today  She has a 24 hour aid at home that can monitor her until then

## 2019-12-19 NOTE — TELEPHONE ENCOUNTER
Patient called and states she has a non stop headache  I asked her if she can come in today and she states no  She does not have a ride  She is asking to speak with a nurse or a provider  She wants to know if her non stop headache is because of her high blood pressure 140/104  That is from today's reading  Please call patient when you can  [phone]    She does have a home nurse with her all the time but she said she wants to talk to someone from here  Dariana Holden

## 2019-12-20 ENCOUNTER — OFFICE VISIT (OUTPATIENT)
Dept: INTERNAL MEDICINE CLINIC | Facility: CLINIC | Age: 54
End: 2019-12-20

## 2019-12-20 VITALS
WEIGHT: 184.3 LBS | HEART RATE: 71 BPM | SYSTOLIC BLOOD PRESSURE: 122 MMHG | TEMPERATURE: 97.9 F | BODY MASS INDEX: 34.82 KG/M2 | DIASTOLIC BLOOD PRESSURE: 86 MMHG | OXYGEN SATURATION: 98 %

## 2019-12-20 DIAGNOSIS — G43.809 OTHER MIGRAINE WITHOUT STATUS MIGRAINOSUS, NOT INTRACTABLE: Primary | ICD-10-CM

## 2019-12-20 PROCEDURE — 99213 OFFICE O/P EST LOW 20 MIN: CPT | Performed by: INTERNAL MEDICINE

## 2019-12-20 RX ORDER — SUMATRIPTAN 50 MG/1
50 TABLET, FILM COATED ORAL ONCE AS NEEDED
Qty: 10 TABLET | Refills: 0 | Status: SHIPPED | OUTPATIENT
Start: 2019-12-20 | End: 2020-03-19 | Stop reason: HOSPADM

## 2019-12-20 NOTE — PROGRESS NOTES
ASSESSMENT/PLAN:  Problem List Items Addressed This Visit        Cardiovascular and Mediastinum    Migraine - Primary     · Patient complaining of headache for the past week which she describes as constant and aching with occasional burning  Patient does have some photophobia and states she generally stays in a dark room during times when her headache is worse  · Patient does have history of migraines and states this does feel similar to her prior migraines  · Will give patient prescription for sumatriptan 50 mg times 10 tablets to take for headache  · Did discuss with patient that she should only take this when headaches are significantly worsen and is not to be taken every day  · Will continue to monitor; patient should follow up with PCP Gustavo Hutson          Relevant Medications    SUMAtriptan (IMITREX) 50 mg tablet          CHIEF COMPLAINT: headache    HISTORY OF PRESENT ILLNESS:  68-year-old female with past medical history significant for lumbar radiculopathy status post back surgery last year, opioid dependence, hypertension, bipolar disorder, generalized anxiety disorder     Patient presents to clinic today for same-day visit secondary to headache  Patient states he has been having headache for the past week and this headache has been constant and she describes the pain as aching and sometimes sharp pain  Patient does acknowledge some photophobia as she states she will stay in a dark room as she feels like does make her headache worse  Patient states that the headache is present in bilateral temporal regions on her forehead and does not radiate anywhere else  Patient has only taken Tylenol for this headache and states that it does not help  Patient does state she has a history of migraines and does feel this is similar to her prior migraines  Patient denies any fever, chill, nausea, vomiting, or lightheadedness        Review of Systems   All other systems reviewed and are negative  OBJECTIVE:  Vitals:    12/20/19 0932   BP: 122/86   BP Location: Right arm   Patient Position: Sitting   Cuff Size: Adult   Pulse: 71   Temp: 97 9 °F (36 6 °C)   TempSrc: Oral   SpO2: 98%   Weight: 83 6 kg (184 lb 4 9 oz)     Physical Exam   Constitutional: She is oriented to person, place, and time  She appears well-developed and well-nourished  No distress  HENT:   Head: Normocephalic and atraumatic  Slight tenderness to palpation frontal and maxillary sinuses  Slight tenderness to palpation of temporal regions bilaterally   Eyes: Conjunctivae are normal  No scleral icterus  Cardiovascular: Normal rate, regular rhythm and normal heart sounds  Exam reveals no gallop and no friction rub  No murmur heard  Pulmonary/Chest: Effort normal and breath sounds normal  No respiratory distress  She has no wheezes  She has no rales  Abdominal: Soft  Bowel sounds are normal  She exhibits no distension  There is no tenderness  Musculoskeletal: Normal range of motion  She exhibits no edema  Neurological: She is alert and oriented to person, place, and time  Skin: Skin is warm  No rash noted  Nursing note and vitals reviewed          Current Outpatient Medications:     acetaminophen (TYLENOL) 500 mg tablet, Take 500 mg by mouth every 6 (six) hours as needed for mild pain, Disp: , Rfl:     amLODIPine (NORVASC) 5 mg tablet, Take 1 tablet (5 mg total) by mouth daily, Disp: 60 tablet, Rfl: 0    aspirin 81 MG tablet, Take 1 tablet (81 mg total) by mouth daily, Disp: 30 tablet, Rfl: 3    busPIRone (BUSPAR) 5 mg tablet, TAKE 1 TABLET (5 MG TOTAL) BY MOUTH 3 (THREE) TIMES A DAY, Disp: 90 tablet, Rfl: 0    cetirizine (ZyrTEC) 10 mg tablet, , Disp: , Rfl: 1    Cholecalciferol (VITAMIN D3) 1000 units CAPS, Take 1 capsule (1,000 Units total) by mouth daily, Disp: 90 capsule, Rfl: 0    DULoxetine (CYMBALTA) 30 mg delayed release capsule, Take 30 mg by mouth daily, Disp: , Rfl:     DULoxetine (CYMBALTA) 60 mg delayed release capsule, Take 1 capsule (60 mg total) by mouth daily, Disp: 90 capsule, Rfl: 0    hydrOXYzine HCL (ATARAX) 25 mg tablet, Take 25 mg by mouth every 8 (eight) hours as needed, Disp: , Rfl:     lactulose (CEPHULAC) 10 g packet, Take 10 g by mouth 3 (three) times a day, Disp: , Rfl:     LORazepam (ATIVAN) 0 5 mg tablet, Take 0 5 mg by mouth every 8 (eight) hours as needed, Disp: , Rfl:     lubiprostone (AMITIZA) 24 mcg capsule, Take 1 capsule (24 mcg total) by mouth 2 (two) times a day with meals, Disp: 30 capsule, Rfl: 3    meloxicam (MOBIC) 7 5 mg tablet, Take 15 mg by mouth daily , Disp: , Rfl:     morphine (MS CONTIN) 15 mg 12 hr tablet, Take 1 tablet (15 mg total) by mouth 2 (two) times a dayMax Daily Amount: 30 mg, Disp: 56 tablet, Rfl: 0    omeprazole (PriLOSEC) 20 mg delayed release capsule, Take 1 capsule (20 mg total) by mouth daily, Disp: 60 capsule, Rfl: 0    oxybutynin (DITROPAN-XL) 10 MG 24 hr tablet, Take 1 tablet (10 mg total) by mouth daily at bedtime, Disp: 90 tablet, Rfl: 0    polyethylene glycol (MIRALAX) 17 g, Take by mouth, Disp: , Rfl:     pregabalin (LYRICA) 75 mg capsule, Take 1 capsule (75 mg total) by mouth 3 (three) times a day, Disp: 90 capsule, Rfl: 1    primidone (MYSOLINE) 250 mg tablet, Take 1 tablet (250 mg total) by mouth daily at bedtime, Disp: 90 tablet, Rfl: 0    propranolol (INDERAL) 20 mg tablet, Take 1 tablet (20 mg total) by mouth 2 (two) times a day, Disp: 120 tablet, Rfl: 0    QUEtiapine (SEROquel XR) 300 mg 24 hr tablet, Take 1 tablet (300 mg total) by mouth daily at bedtime, Disp: 60 tablet, Rfl: 1    senna-docusate sodium (SENOKOT-S) 8 6-50 mg per tablet, Take 1 tablet by mouth daily, Disp: , Rfl:     tamsulosin (FLOMAX) 0 4 mg, Take 1 capsule (0 4 mg total) by mouth daily with dinner, Disp: 60 capsule, Rfl: 1    traZODone (DESYREL) 50 mg tablet, Take 50 mg by mouth, Disp: , Rfl:     Valbenazine Tosylate 80 MG CAPS, Take 80 mg by mouth daily, Disp: , Rfl:     SUMAtriptan (IMITREX) 50 mg tablet, Take 1 tablet (50 mg total) by mouth once as needed for migraine for up to 1 dose, Disp: 10 tablet, Rfl: 0    Past Medical History:   Diagnosis Date    Acid reflux     Anxiety     RESOLVED: [id number]    Arthritis     Bipolar 2 disorder (HCC)     FOLLOWS WITH PSYCHIATRIST  CONTINUE LAMOTRIGINE; RESOLVED: [id number]    Depression     Familial tremor     both hands    Fibromyalgia     LAST ASSESSED: [id number]    Hearing aid worn     left ear    [place] (hard of hearing)     left ear    Hypertension     Left-sided weakness     Lower back pain     Memory loss of unknown cause     long and short term    Migraine     Overactive bladder     Panic attack     Post traumatic stress disorder     Seasonal allergies     Stroke [facility])     questionable stroke 2009    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    CONT ASA 81 MG DAILY; RESOLVED: [id number]    Urinary incontinence     Wears dentures     partial lower / full upper    Wears glasses      Past Surgical History:   Procedure Laterality Date    BACK SURGERY       SECTION      COLONOSCOPY      RESOLVED: [id number]    EAR SURGERY      HYSTERECTOMY  2004    MYRINGOTOMY W/ TUBES Left     NECK SURGERY  2019    [place] CYSTOURETHROSCOPY N/A 2016    Procedure: CYSTOSCOPY, BOTOX INJECTION;  Surgeon: Lakesha Starr MD;  [facility]: [facility];  Service: Gynecology    TONSILLECTOMY      TUBAL LIGATION       Social History     Socioeconomic History    Marital status:      Spouse name: Not on file    Number of children: 2    Years of education: graduate school     Highest education level: Not on file   Occupational History    Occupation: Disabled   Social Needs    Financial resource strain: Somewhat hard    Food insecurity:     Worry: Not on file     Inability: Not on file   [email or web address] needs:     Medical: Not on file     Non-medical: Not on file   Tobacco Use    Smoking status: Never Smoker    Smokeless tobacco: Never Used   Substance and Sexual Activity    Alcohol use: No     Comment: 2 x year; being a social drinker as per all scripts     Drug use: No    Sexual activity: Not on file   Lifestyle    Physical activity:     Days per week: Not on file     Minutes per session: Not on file    Stress: Not on file   Relationships    Social connections:     Talks on phone: Not on file     Gets together: Not on file     Attends Nondenominational service: Not on file     Active member of club or organization: Not on file     Attends meetings of clubs or organizations: Not on file     Relationship status: Not on file    Intimate partner violence:     Fear of current or ex partner: Not on file     Emotionally abused: Not on file     Physically abused: Not on file     Forced sexual activity: Not on file   Other Topics Concern    Not on file   Social History Narrative    Bereavement    Daily caffeine consumption, 6-8 servings per day     as per all scripts    Lives in [place]      Family History   Problem Relation Age of Onset    Colon cancer Mother     Alzheimer's disease Father     Stroke Father     Colon cancer Brother     Bipolar disorder Brother     Breast cancer Maternal Aunt     Heart disease Other     Diabetes Other     Hypertension Other     Seizures Son        Alden Collado, [address] [facility]  [address] [facility]  [address] [facility],[address]  [place], [address] [facility]

## 2019-12-20 NOTE — ASSESSMENT & PLAN NOTE
· Patient complaining of headache for the past week which she describes as constant and aching with occasional burning  Patient does have some photophobia and states she generally stays in a dark room during times when her headache is worse  · Patient does have history of migraines and states this does feel similar to her prior migraines  · Will give patient prescription for sumatriptan 50 mg times 10 tablets to take for headache  · Did discuss with patient that she should only take this when headaches are significantly worsen and is not to be taken every day    · Will continue to monitor; patient should follow up with PCP Florencia Cespedes

## 2019-12-24 DIAGNOSIS — M54.16 LUMBAR RADICULOPATHY: ICD-10-CM

## 2019-12-24 DIAGNOSIS — G89.4 CHRONIC PAIN DISORDER: ICD-10-CM

## 2019-12-26 RX ORDER — MORPHINE SULFATE 15 MG/1
15 TABLET, FILM COATED, EXTENDED RELEASE ORAL 2 TIMES DAILY
Qty: 56 TABLET | Refills: 0 | Status: SHIPPED | OUTPATIENT
Start: 2019-12-26 | End: 2020-01-20 | Stop reason: SDUPTHER

## 2020-01-03 ENCOUNTER — TELEPHONE (OUTPATIENT)
Dept: INTERNAL MEDICINE CLINIC | Facility: CLINIC | Age: 55
End: 2020-01-03

## 2020-01-03 ENCOUNTER — PATIENT OUTREACH (OUTPATIENT)
Dept: INTERNAL MEDICINE CLINIC | Facility: CLINIC | Age: 55
End: 2020-01-03

## 2020-01-03 DIAGNOSIS — M79.7 FIBROMYALGIA: ICD-10-CM

## 2020-01-03 DIAGNOSIS — G43.809 OTHER MIGRAINE WITHOUT STATUS MIGRAINOSUS, NOT INTRACTABLE: Primary | ICD-10-CM

## 2020-01-03 NOTE — TELEPHONE ENCOUNTER
Patients Occupational Health Therapist called Gemma Duverney ) and is requesting a referral request for [address] and is [facility]  She states this would benefit this patient      [facility]   Fax : [phone]

## 2020-01-06 DIAGNOSIS — M54.41 CHRONIC BILATERAL LOW BACK PAIN WITH BILATERAL SCIATICA: Primary | ICD-10-CM

## 2020-01-06 DIAGNOSIS — M54.42 CHRONIC BILATERAL LOW BACK PAIN WITH BILATERAL SCIATICA: Primary | ICD-10-CM

## 2020-01-06 DIAGNOSIS — G89.29 CHRONIC BILATERAL LOW BACK PAIN WITH BILATERAL SCIATICA: Primary | ICD-10-CM

## 2020-01-08 ENCOUNTER — OFFICE VISIT (OUTPATIENT)
Dept: OBGYN CLINIC | Facility: CLINIC | Age: 55
End: 2020-01-08
Payer: MEDICARE

## 2020-01-08 DIAGNOSIS — M17.0 PRIMARY LOCALIZED OSTEOARTHRITIS OF BOTH KNEES: Primary | ICD-10-CM

## 2020-01-08 PROCEDURE — 20610 DRAIN/INJ JOINT/BURSA W/O US: CPT | Performed by: ORTHOPAEDIC SURGERY

## 2020-01-08 PROCEDURE — 99212 OFFICE O/P EST SF 10 MIN: CPT | Performed by: ORTHOPAEDIC SURGERY

## 2020-01-08 RX ORDER — LIDOCAINE HYDROCHLORIDE 10 MG/ML
1 INJECTION, SOLUTION INFILTRATION; PERINEURAL
Status: COMPLETED | OUTPATIENT
Start: 2020-01-08 | End: 2020-01-08

## 2020-01-08 RX ORDER — BETAMETHASONE SODIUM PHOSPHATE AND BETAMETHASONE ACETATE 3; 3 MG/ML; MG/ML
6 INJECTION, SUSPENSION INTRA-ARTICULAR; INTRALESIONAL; INTRAMUSCULAR; SOFT TISSUE
Status: COMPLETED | OUTPATIENT
Start: 2020-01-08 | End: 2020-01-08

## 2020-01-08 RX ADMIN — LIDOCAINE HYDROCHLORIDE 1 ML: 10 INJECTION, SOLUTION INFILTRATION; PERINEURAL at 10:41

## 2020-01-08 RX ADMIN — BETAMETHASONE SODIUM PHOSPHATE AND BETAMETHASONE ACETATE 6 MG: 3; 3 INJECTION, SUSPENSION INTRA-ARTICULAR; INTRALESIONAL; INTRAMUSCULAR; SOFT TISSUE at 10:41

## 2020-01-08 NOTE — PROGRESS NOTES
Assessment:  1  Primary localized osteoarthritis of both knees       Patient Active Problem List   Diagnosis    Chronic bilateral low back pain with bilateral sciatica    Right knee pain    Chronic pain disorder    Lumbar radiculopathy    Lumbar spondylosis    Fibromyalgia    Lumbar disc disease with radiculopathy    Spinal stenosis of lumbar region with neurogenic claudication    Anxiety    Pain in joint, shoulder region    Bilateral hip pain    Bipolar II disorder ([address] )    Cognitive disorder    Esophageal reflux    Fatigue    Female pelvic pain    Generalized anxiety disorder    Hypertension    Hypokalemia    Insomnia    Major depressive disorder, recurrent episode, moderate degree (HCC)    Migraine    Opioid dependence (HCC)    Osteoarthritis of both hips    Osteoarthritis of knee    Overactive bladder    Pain, joint, hip    Panic disorder without agoraphobia    Post traumatic stress disorder    Primary localized osteoarthritis of both knees    Recent unexplained weight loss    Sacroiliitis (HCC)    Tremor    Urinary incontinence    Vitamin D deficiency    Post laminectomy syndrome    Encounter for long-term use of opiate analgesic    Family history of colorectal cancer           Plan      Cortisone injections given  We had a discussion about the possibility of knee replacement down the road but I do have concern with compliance  She also has a tremor history of stroke cerebral artery thrombosis I am concerned with postoperative complications with her she would be a high risk for surgical intervention  He would need everything optimized including her teeth be pulled            Subjective:     Patient ID:    Chief Complaint:Samantha Raek Journey 62 y o  female      HPI    Patient comes in with regards to bilateral knee pain  She has pain in both knees severe arthritis we have given her lubricant injections it helped for a little while but her knee pain is back    She does see pain management for her back issues as well        The following portions of the patient's history were reviewed and updated as appropriate: allergies, current medications, past family history, past social history, past surgical history and problem list     All organ systems normal    Social History     Socioeconomic History    Marital status:      Spouse name: Not on file    Number of children: 2    Years of education: graduate school     Highest education level: Not on file   Occupational History    Occupation: Disabled   Social Needs    Financial resource strain: Somewhat hard    Food insecurity:     Worry: Not on file     Inability: Not on file    Transportation needs:     Medical: Not on file     Non-medical: Not on file   Tobacco Use    Smoking status: Never Smoker    Smokeless tobacco: Never Used   Substance and Sexual Activity    Alcohol use: No     Comment: 2 x year; being a social drinker as per all scripts     Drug use: No    Sexual activity: Not on file   Lifestyle    Physical activity:     Days per week: Not on file     Minutes per session: Not on file    Stress: Not on file   Relationships    Social connections:     Talks on phone: Not on file     Gets together: Not on file     Attends Buddhist service: Not on file     Active member of club or organization: Not on file     Attends meetings of clubs or organizations: Not on file     Relationship status: Not on file    Intimate partner violence:     Fear of current or ex partner: Not on file     Emotionally abused: Not on file     Physically abused: Not on file     Forced sexual activity: Not on file   Other Topics Concern    Not on file   Social History Narrative    Bereavement    Daily caffeine consumption, 6-8 servings per day     as per all scripts    Lives in [place]      Past Medical History:   Diagnosis Date    Acid reflux     Anxiety     RESOLVED: [id number]    Arthritis     Bipolar 2 disorder ([facility] [address] ) FOLLOWS WITH PSYCHIATRIST  CONTINUE LAMOTRIGINE; RESOLVED: [id number]    Depression     Familial tremor     both hands    Fibromyalgia     LAST ASSESSED: [id number]    Hearing aid worn     left ear    [place] (hard of hearing)     left ear    Hypertension     Left-sided weakness     Lower back pain     Memory loss of unknown cause     long and short term    Migraine     Overactive bladder     Panic attack     Post traumatic stress disorder     Seasonal allergies     Stroke 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    CONT ASA 81 MG DAILY; RESOLVED: [id number]    Urinary incontinence     Wears dentures     partial lower / full upper    Wears glasses      Past Surgical History:   Procedure Laterality Date    BACK SURGERY       SECTION      COLONOSCOPY      RESOLVED: [id number]    EAR SURGERY      HYSTERECTOMY      MYRINGOTOMY W/ TUBES Left     NECK SURGERY  2019    [place] CYSTOURETHROSCOPY N/A 2016    Procedure: CYSTOSCOPY, BOTOX INJECTION;  Surgeon: Collin Esparza MD;  [facility];  Service: Gynecology    TONSILLECTOMY     [address] [place]     No Known Allergies  Current Outpatient Medications on File Prior to Visit   Medication Sig Dispense Refill    acetaminophen (TYLENOL) 500 mg tablet Take 500 mg by mouth every 6 (six) hours as needed for mild pain      amLODIPine (NORVASC) 5 mg tablet Take 1 tablet (5 mg total) by mouth daily 60 tablet 0    aspirin 81 MG tablet Take 1 tablet (81 mg total) by mouth daily 30 tablet 3    busPIRone (BUSPAR) 5 mg tablet TAKE 1 TABLET (5 MG TOTAL) BY MOUTH 3 (THREE) TIMES A DAY 90 tablet 0    cetirizine (ZyrTEC) 10 mg tablet   1    Cholecalciferol (VITAMIN D3) 1000 units CAPS Take 1 capsule (1,000 Units total) by mouth daily 90 capsule 0    DULoxetine (CYMBALTA) 30 mg delayed release capsule Take 30 mg by mouth daily      DULoxetine (CYMBALTA) 60 mg delayed release capsule Take 1 capsule (60 mg total) by mouth daily 90 capsule 0    hydrOXYzine HCL (ATARAX) 25 mg tablet Take 25 mg by mouth every 8 (eight) hours as needed      lactulose (CEPHULAC) 10 g packet Take 10 g by mouth 3 (three) times a day      LORazepam (ATIVAN) 0 5 mg tablet Take 0 5 mg by mouth every 8 (eight) hours as needed      lubiprostone (AMITIZA) 24 mcg capsule Take 1 capsule (24 mcg total) by mouth 2 (two) times a day with meals 30 capsule 3    meloxicam (MOBIC) 7 5 mg tablet Take 15 mg by mouth daily       morphine (MS CONTIN) 15 mg 12 hr tablet Take 1 tablet (15 mg total) by mouth 2 (two) times a dayMax Daily Amount: 30 mg 56 tablet 0    omeprazole (PriLOSEC) 20 mg delayed release capsule Take 1 capsule (20 mg total) by mouth daily 60 capsule 0    oxybutynin (DITROPAN-XL) 10 MG 24 hr tablet Take 1 tablet (10 mg total) by mouth daily at bedtime 90 tablet 0    polyethylene glycol (MIRALAX) 17 g Take by mouth      pregabalin (LYRICA) 75 mg capsule Take 1 capsule (75 mg total) by mouth 3 (three) times a day 90 capsule 1    primidone (MYSOLINE) 250 mg tablet Take 1 tablet (250 mg total) by mouth daily at bedtime 90 tablet 0    propranolol (INDERAL) 20 mg tablet Take 1 tablet (20 mg total) by mouth 2 (two) times a day 120 tablet 0    QUEtiapine (SEROquel XR) 300 mg 24 hr tablet Take 1 tablet (300 mg total) by mouth daily at bedtime 60 tablet 1    senna-docusate sodium (SENOKOT-S) 8 6-50 mg per tablet Take 1 tablet by mouth daily      SUMAtriptan (IMITREX) 50 mg tablet Take 1 tablet (50 mg total) by mouth once as needed for migraine for up to 1 dose 10 tablet 0    tamsulosin (FLOMAX) 0 4 mg Take 1 capsule (0 4 mg total) by mouth daily with dinner 60 capsule 1    traZODone (DESYREL) 50 mg tablet Take 50 mg by mouth      Valbenazine Tosylate 80 MG CAPS Take 80 mg by mouth daily       No current facility-administered medications on file prior to visit                 Objective:        Ortho Exam  Tenderness to palpation over both medial and lateral joint lines  No effusion or ecchymosis  Large joint arthrocentesis: R knee  Date/Time: 1/8/2020 10:41 AM  Consent given by: patient  Supporting Documentation  Indications: pain   Procedure Details  Location: knee - R knee  Needle size: 22 G  Ultrasound guidance: no  Approach: anterolateral  Medications administered: 1 mL lidocaine 1 %; 6 mg betamethasone acetate-betamethasone sodium phosphate 6 (3-3) mg/mL      Large joint arthrocentesis: L knee  Date/Time: 1/8/2020 10:41 AM  Consent given by: patient  Timeout: Immediately prior to procedure a time out was called to verify the correct patient, procedure, equipment, support staff and site/side marked as required   Supporting Documentation  Indications: pain   Procedure Details  Location: knee - L knee  Needle size: 22 G  Ultrasound guidance: no  Approach: anterolateral  Medications administered: 1 mL lidocaine 1 %; 6 mg betamethasone acetate-betamethasone sodium phosphate 6 (3-3) mg/mL    Patient tolerance: patient tolerated the procedure well with no immediate complications                Portions of the record may have been created with voice recognition software   Occasional wrong word or "sound a like" substitutions may have occurred due to the inherent limitations of voice recognition software   Read the chart carefully and recognize, using context, where substitutions have occurred

## 2020-01-10 ENCOUNTER — TELEPHONE (OUTPATIENT)
Dept: INTERNAL MEDICINE CLINIC | Facility: CLINIC | Age: 55
End: 2020-01-10

## 2020-01-10 NOTE — TELEPHONE ENCOUNTER
Patient called stating she is currently taking   tamsulosin (FLOMAX) 0 4 mg, she wants to know if she can stop the  medication  Her physiatrist  wants to prescribe new medication  Patient wants to clarify what she needs and what she can stop taking

## 2020-01-14 ENCOUNTER — CONSULT (OUTPATIENT)
Dept: NEUROSURGERY | Facility: CLINIC | Age: 55
End: 2020-01-14
Payer: MEDICARE

## 2020-01-14 VITALS
BODY MASS INDEX: 36.55 KG/M2 | HEART RATE: 67 BPM | HEIGHT: 61 IN | WEIGHT: 193.6 LBS | DIASTOLIC BLOOD PRESSURE: 94 MMHG | RESPIRATION RATE: 16 BRPM | SYSTOLIC BLOOD PRESSURE: 147 MMHG

## 2020-01-14 DIAGNOSIS — M54.42 CHRONIC BILATERAL LOW BACK PAIN WITH BILATERAL SCIATICA: ICD-10-CM

## 2020-01-14 DIAGNOSIS — F09 COGNITIVE DISORDER: ICD-10-CM

## 2020-01-14 DIAGNOSIS — G89.29 CHRONIC BILATERAL LOW BACK PAIN WITH BILATERAL SCIATICA: ICD-10-CM

## 2020-01-14 DIAGNOSIS — F11.20 UNCOMPLICATED OPIOID DEPENDENCE (HCC): ICD-10-CM

## 2020-01-14 DIAGNOSIS — M54.41 CHRONIC BILATERAL LOW BACK PAIN WITH BILATERAL SCIATICA: ICD-10-CM

## 2020-01-14 DIAGNOSIS — M54.16 LUMBAR RADICULOPATHY: ICD-10-CM

## 2020-01-14 DIAGNOSIS — G89.4 CHRONIC PAIN DISORDER: Primary | ICD-10-CM

## 2020-01-14 PROCEDURE — 99204 OFFICE O/P NEW MOD 45 MIN: CPT | Performed by: NEUROLOGICAL SURGERY

## 2020-01-14 RX ORDER — IBUPROFEN 600 MG/1
600 TABLET ORAL EVERY 6 HOURS PRN
COMMUNITY
End: 2020-03-19 | Stop reason: HOSPADM

## 2020-01-14 NOTE — PROGRESS NOTES
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  She has a vague historian and her presentation is somewhat convoluted  She had an extensive thoracolumbar decompression and fusion in 2018 in [place] and then afterwards had an anterior cervical diskectomy in the cervical spine  These procedures seem to exacerbate her chronic pain  It is unclear if the patient had a successful spine [employer] spinal cord stimulator trial   Based on the notes from her surgeon in [place], I am not sure how she would even be a candidate for a trial if she had an extensive thoracic decompression as there would be too much epidural scar  No documentation is available from her pain specialist   The patient herself indicates that the stimulator trial was helpful but is somewhat inconsistent in the details of the trial      I explained to her, that based on the information available, I do not think she would be a good candidate for a repeat trial or placement of a permanent system  However, we will obtain an MRI of the thoracic spine to further determine the extent of surgery at that area in addition to upright plain films of the thoracolumbar spine  I will refer her to Dr Caren Garcia to provide his opinion on a repeat trial vs consideration of intrathecal pain pump  She will follow up through this office on a p r n  Basis  She seems somewhat disappointed  History, physical examination and diagnostic tests were reviewed and questions answered  Diagnosis, care plan and treatment options were discussed  The patient understand instructions and will follow up as directed      Plan:    Follow-up: prn    Problem List Items Addressed This Visit        Nervous and Auditory    Chronic bilateral low back pain with bilateral sciatica - Primary    Relevant Orders    MRI thoracic spine without contrast    XR entire spine (scoliosis) 2-3 vw    Ambulatory referral to Neurosurgery    Lumbar radiculopathy    Cognitive disorder       Other    Chronic pain disorder          Subjective/Objective     Chief Complaint    Lower back and bilateral leg pain  HPI    57-year-old woman with chronic lower back and bilateral leg pain  She has a somewhat vague historian, but moved from [place] to [place]  In 2018 she underwent a thoracolumbar decompression and fusion which seem to exacerbate her chronic lower back and leg pain  Later that year she also underwent an anterior cervical diskectomy and fusion  This part of the history is obtained by interviewing the patient and seems to be consistent with notes from her surgeon in [place] from September of 2018  At her last visit with her surgeon she was encouraged to attempt a spinal cord stimulator trial for her pain  Unfortunately, documentation from her floor the pain specialist has not been obtained despite numerous attempts by her local pain specialist     Per the patient she underwent a Medtr[employer] spinal cord stimulator trial   It sounds that the leads were placed percutaneously though the patient is somewhat unsure  During the 5 day trial she noted approximately 50% improvement in her lower back and leg pain  She was somewhat more comfortable and her sleep was improved to some extent  It is unclear if her pain medication was increased or remained the same during the trial itself  She then moved back to [place] and his reestablish care with her pain specialist here  She is referred today to discuss a repeat trial as no information can be obtained from [place]  KARINA COLLINS personally reviewed and updated  Review of Systems   Constitutional: Positive for chills (when pain increases ) and fatigue  HENT: Negative  Eyes: Negative  Respiratory: Negative  Cardiovascular: Negative  Gastrointestinal: Positive for constipation  Endocrine: Negative  Genitourinary: Positive for frequency  Negative for urgency  Musculoskeletal: Positive for back pain (up/down spine, across lower back radiates into bilateral hips, right buttock, and down bilateral legs/knees) and gait problem  Uses walker for assistance    Skin: Negative  Allergic/Immunologic: Negative  Neurological: Positive for weakness (bilateral legs ), numbness (bilateral legs numbness and tingling ) and headaches (migraines )  Negative for dizziness, seizures and syncope  Hematological: Does not bruise/bleed easily (patient on ASA 81)  Psychiatric/Behavioral: Positive for sleep disturbance (due to pain )  Negative for confusion         Family History    Family History   Problem Relation Age of Onset    Colon cancer Mother     Alzheimer's disease Father     Stroke Father     Colon cancer Brother     Bipolar disorder Brother     Breast cancer Maternal Aunt     Heart disease Other     Diabetes Other     Hypertension Other     Seizures Son        Social History    Social History     Socioeconomic History    Marital status:      Spouse name: Not on file    Number of children: 2    Years of education: graduate school     Highest education level: Not on file   Occupational History    Occupation: Disabled   Social Needs    Financial resource strain: Somewhat hard    Food insecurity:     Worry: Not on file     Inability: Not on file    Transportation needs:     Medical: Not on file     Non-medical: Not on file   Tobacco Use    Smoking status: Never Smoker    Smokeless tobacco: Never Used   Substance and Sexual Activity    Alcohol use: No     Comment: 2 x year; being a social drinker as per all scripts     Drug use: No    Sexual activity: Not on file   Lifestyle    Physical activity:     Days per week: Not on file     Minutes per session: Not on file    Stress: Not on file   Relationships    Social connections:     Talks on phone: Not on file     Gets together: Not on file     Attends Orthodoxy service: Not on file     Active member of club or organization: Not on file     Attends meetings of clubs or organizations: Not on file     Relationship status: Not on file    Intimate partner violence:     Fear of current or ex partner: Not on file     Emotionally abused: Not on file     Physically abused: Not on file     Forced sexual activity: Not on file   Other Topics Concern    Not on file   Social History Narrative    Bereavement    Daily caffeine consumption, 6-8 servings per day     as per all scripts    Lives in [place]        Past Medical History    Past Medical History:   Diagnosis Date    Acid reflux     Anxiety     RESOLVED: [id number]    Arthritis     Bipolar 2 disorder ([facility] [address] )     [address]  CONTINUE LAMOTRIGINE; RESOLVED: [id number]    Depression     Familial tremor     both hands    Fibromyalgia     LAST ASSESSED: [id number]    Hearing aid worn     left ear    [place] (hard of hearing)     left ear    Hypertension     Left-sided weakness     Lower back pain     Memory loss of unknown cause     long and short term    Migraine     Overactive bladder     Panic attack     Post traumatic stress disorder     Seasonal allergies     Stroke [facility])     questionable stroke 2009    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    CONT ASA 81 MG DAILY; RESOLVED: [id number]    Urinary incontinence     Wears dentures     partial lower / full upper    Wears glasses        Surgical History    Past Surgical History:   Procedure Laterality Date    BACK SURGERY       SECTION      COLONOSCOPY      RESOLVED: [id number]    EAR SURGERY      HYSTERECTOMY  2004    MYRINGOTOMY W/ TUBES Left     NECK SURGERY  2019    [place] CYSTOURETHROSCOPY N/A 2016    Procedure: CYSTOSCOPY, BOTOX INJECTION;  Surgeon: Anthony Jean MD;  Location: [facility];  Service: Gynecology    TONSILLECTOMY  TUBAL LIGATION  1986       Medications      Current Outpatient Medications:     acetaminophen (TYLENOL) 500 mg tablet, Take 500 mg by mouth every 6 (six) hours as needed for mild pain, Disp: , Rfl:     amLODIPine (NORVASC) 5 mg tablet, Take 1 tablet (5 mg total) by mouth daily, Disp: 60 tablet, Rfl: 0    aspirin 81 MG tablet, Take 1 tablet (81 mg total) by mouth daily, Disp: 30 tablet, Rfl: 3    busPIRone (BUSPAR) 5 mg tablet, TAKE 1 TABLET (5 MG TOTAL) BY MOUTH 3 (THREE) TIMES A DAY, Disp: 90 tablet, Rfl: 0    cetirizine (ZyrTEC) 10 mg tablet, , Disp: , Rfl: 1    Cholecalciferol (VITAMIN D3) 1000 units CAPS, Take 1 capsule (1,000 Units total) by mouth daily, Disp: 90 capsule, Rfl: 0    DULoxetine (CYMBALTA) 30 mg delayed release capsule, Take 30 mg by mouth daily, Disp: , Rfl:     DULoxetine (CYMBALTA) 60 mg delayed release capsule, Take 1 capsule (60 mg total) by mouth daily, Disp: 90 capsule, Rfl: 0    hydrOXYzine HCL (ATARAX) 25 mg tablet, Take 25 mg by mouth every 8 (eight) hours as needed, Disp: , Rfl:     ibuprofen (MOTRIN) 600 mg tablet, Take 600 mg by mouth every 6 (six) hours as needed for mild pain, Disp: , Rfl:     lactulose (CEPHULAC) 10 g packet, Take 10 g by mouth 3 (three) times a day, Disp: , Rfl:     LORazepam (ATIVAN) 0 5 mg tablet, Take 0 5 mg by mouth every 8 (eight) hours as needed, Disp: , Rfl:     lubiprostone (AMITIZA) 24 mcg capsule, Take 1 capsule (24 mcg total) by mouth 2 (two) times a day with meals, Disp: 30 capsule, Rfl: 3    meloxicam (MOBIC) 15 mg tablet, Take 15 mg by mouth daily , Disp: , Rfl:     morphine (MS CONTIN) 15 mg 12 hr tablet, Take 1 tablet (15 mg total) by mouth 2 (two) times a dayMax Daily Amount: 30 mg, Disp: 56 tablet, Rfl: 0    omeprazole (PriLOSEC) 20 mg delayed release capsule, Take 1 capsule (20 mg total) by mouth daily, Disp: 60 capsule, Rfl: 0    oxybutynin (DITROPAN-XL) 10 MG 24 hr tablet, Take 1 tablet (10 mg total) by mouth daily at bedtime, Disp: 90 tablet, Rfl: 0    polyethylene glycol (MIRALAX) 17 g, Take by mouth, Disp: , Rfl:     pregabalin (LYRICA) 75 mg capsule, Take 1 capsule (75 mg total) by mouth 3 (three) times a day, Disp: 90 capsule, Rfl: 1    primidone (MYSOLINE) 250 mg tablet, Take 1 tablet (250 mg total) by mouth daily at bedtime, Disp: 90 tablet, Rfl: 0    propranolol (INDERAL) 20 mg tablet, Take 1 tablet (20 mg total) by mouth 2 (two) times a day, Disp: 120 tablet, Rfl: 0    QUEtiapine (SEROquel XR) 300 mg 24 hr tablet, Take 1 tablet (300 mg total) by mouth daily at bedtime, Disp: 60 tablet, Rfl: 1    senna-docusate sodium (SENOKOT-S) 8 6-50 mg per tablet, Take 1 tablet by mouth daily, Disp: , Rfl:     SUMAtriptan (IMITREX) 50 mg tablet, Take 1 tablet (50 mg total) by mouth once as needed for migraine for up to 1 dose, Disp: 10 tablet, Rfl: 0    tamsulosin (FLOMAX) 0 4 mg, Take 1 capsule (0 4 mg total) by mouth daily with dinner, Disp: 60 capsule, Rfl: 1    Valbenazine Tosylate 80 MG CAPS, Take 80 mg by mouth daily, Disp: , Rfl:     Allergies    No Known Allergies    The following portions of the patient's history were reviewed and updated as appropriate: allergies, current medications, past family history, past medical history, past social history, past surgical history and problem list     Physical Exam    Vitals:  Blood pressure 147/94, pulse 67, resp  rate 16, height 5' 1" (1 549 m), weight 87 8 kg (193 lb 9 6 oz), not currently breastfeeding  ,Body mass index is 36 58 kg/m²  Physical Exam   Constitutional: She appears well-developed and well-nourished  No distress  Morbidly obese  HENT:   Head: Atraumatic  Eyes: EOM are normal    Pulmonary/Chest: Effort normal  No respiratory distress  Musculoskeletal:   Walks with a stooped forward posture using a walker  Neurological: She is alert  Diffuse weakness in lower extremities which appears to be antalgic in nature    Patient can barely overcome gravity testing in lower extremities, but is noted to be able to walk with the use of a walker  Reports normal light touch in lower extremities  Skin: Skin is warm and dry  Large midline thoracolumbar incision  Psychiatric: Her affect is blunt  Her speech is delayed  She is slowed  Cognition and memory are impaired  Vitals reviewed      Neurologic Exam     Cranial Nerves     CN III, IV, VI   Extraocular motions are normal

## 2020-01-15 ENCOUNTER — OFFICE VISIT (OUTPATIENT)
Dept: INTERNAL MEDICINE CLINIC | Facility: CLINIC | Age: 55
End: 2020-01-15

## 2020-01-15 VITALS
HEART RATE: 65 BPM | HEIGHT: 61 IN | OXYGEN SATURATION: 96 % | WEIGHT: 194 LBS | DIASTOLIC BLOOD PRESSURE: 90 MMHG | TEMPERATURE: 98.3 F | SYSTOLIC BLOOD PRESSURE: 122 MMHG | BODY MASS INDEX: 36.63 KG/M2

## 2020-01-15 DIAGNOSIS — F51.5 NIGHTMARES: Primary | ICD-10-CM

## 2020-01-15 DIAGNOSIS — Z00.00 HEALTHCARE MAINTENANCE: ICD-10-CM

## 2020-01-15 PROCEDURE — 99213 OFFICE O/P EST LOW 20 MIN: CPT | Performed by: INTERNAL MEDICINE

## 2020-01-15 RX ORDER — PRAZOSIN HYDROCHLORIDE 1 MG/1
1 CAPSULE ORAL
Qty: 90 CAPSULE | Refills: 0 | Status: SHIPPED | OUTPATIENT
Start: 2020-01-15 | End: 2020-03-19 | Stop reason: HOSPADM

## 2020-01-15 NOTE — PATIENT INSTRUCTIONS
Insomnia   AMBULATORY CARE:   Insomnia  is a condition that makes it hard to fall or stay asleep  Lack of sleep can lead to attention or memory problems during the day  You may also be whyte, depressed, clumsy, or have headaches  Contact your healthcare provider if:   · Your symptoms do not get better, or they get worse  · You begin to use drugs or alcohol to fall asleep  · You have questions or concerns about your condition or care  Medicines:   · Medicines  may help you sleep more regularly or help you feel less anxious  · Take your medicine as directed  Contact your healthcare provider if you think your medicine is not helping or if you have side effects  Tell him or her if you are allergic to any medicine  Keep a list of the medicines, vitamins, and herbs you take  Include the amounts, and when and why you take them  Bring the list or the pill bottles to follow-up visits  Carry your medicine list with you in case of an emergency  What you can do to improve your sleep:   · Create a sleep schedule  This will help you form a sleep routine  Keep a record of your sleep patterns, and any sleeping problems you have  Bring the record to follow-up visits with healthcare providers  · Do not take naps  Naps could make it hard for you to fall asleep at bedtime  · Keep your bedroom cool, quiet, and dark  Turn on white noise, such as a fan, to help you relax  Do not use your bed for any activity that will keep you awake  Do not read, exercise, eat, or watch TV in your bedroom  · Get up if you do not fall asleep within 20 minutes  Move to another room and do something relaxing until you become sleepy  · Limit caffeine, alcohol, and food to earlier in the day  Only drink caffeine in the morning  Do not drink alcohol within 6 hours of bedtime  Do not eat a heavy meal right before you go to bed  · Exercise regularly  Daily exercise may help you sleep better   Do not exercise within 4 hours of bedtime  Follow up with your healthcare provider as directed: Your healthcare provider may refer you for cognitive behavioral therapy  A behavioral therapist may help you find ways to relax, decrease stress, and improve sleep  Write down your questions so you remember to ask them during your visits  © 2017 [employer] Information is for End User's use only and may not be sold, redistributed or otherwise used for commercial purposes  All illustrations and images included in CareNotes® are the copyrighted property of [employer]  or [employer]  The above information is an  only  It is not intended as medical advice for individual conditions or treatments  Talk to your doctor, nurse or pharmacist before following any medical regimen to see if it is safe and effective for you

## 2020-01-15 NOTE — PROGRESS NOTES
INTERNAL MEDICINE FOLLOW-UP OFFICE VISIT  [facility]  [address] [facility], [address]    NAME: Jonathan Pineda  AGE: 62 y o  SEX: female    DATE OF ENCOUNTER: 1/15/2020    Assessment and Plan     Diagnoses and all orders for this visit:    Nightmares  -     prazosin (MINIPRESS) 1 mg capsule; Take 1 capsule (1 mg total) by mouth daily at bedtime  Will refer for sleep study    Healthcare maintenance  -     Lipid panel; Future  Due for colonoscopy and mammogram   Patient states will perform the near future  Has not been screened for diabetes but however recent BMP show glucoses within normal range thus okay to not obtain A1c at this time      Orders Placed This Encounter   Procedures    Lipid panel    Home Study       - Counseling Documentation: patient was counseled regarding: diagnostic results, instructions for management, risk factor reductions, prognosis, patient and family education, impressions, risks and benefits of treatment options and importance of compliance with treatment  - Counseling Time: counseling time more than 50% of visit: 30 minutes  - Barriers to treatment: no  - Medication Side Effects: Adverse side effects of medications were reviewed with the patient/guardian today  Chief Complaint     Chief Complaint   Patient presents with    Follow-up    Medication Problem     would like to discuss her medications        History of Present Illness     78-year-old female with past medical history significant of hypertension, chronic pain, osteoarthritis bilateral knees, migraines presents for follow-up visit  Patient states that she has been having reoccurring night manage recently  With associated insomnia  Recently seen S by orthopedics and received steroid injections  She is being seen by spine doctor being fitted for spinal cord stimulator    She denies any other acute complaints      The following portions of the patient's history were reviewed and updated as appropriate: allergies, current medications, past family history, past medical history, past social history, past surgical history and problem list     Review of Systems     Review of Systems   Constitutional: Negative for activity change, appetite change, chills, diaphoresis, fever and unexpected weight change  HENT: Negative for congestion, facial swelling and rhinorrhea  Eyes: Negative for photophobia and visual disturbance  Respiratory: Negative for cough, shortness of breath and wheezing  Cardiovascular: Negative for chest pain and palpitations  Gastrointestinal: Negative for abdominal pain, blood in stool, constipation, diarrhea, nausea and vomiting  Genitourinary: Negative for decreased urine volume, difficulty urinating, dysuria, flank pain, frequency, hematuria and urgency  Musculoskeletal: Negative for arthralgias, back pain, joint swelling and myalgias  Neurological: Negative for dizziness, syncope, facial asymmetry, light-headedness, numbness and headaches  Psychiatric/Behavioral: Positive for sleep disturbance  Negative for confusion and decreased concentration  The patient is not nervous/anxious          Active Problem List     Patient Active Problem List   Diagnosis    Chronic bilateral low back pain with bilateral sciatica    Right knee pain    Chronic pain disorder    Lumbar radiculopathy    Lumbar spondylosis    Fibromyalgia    Lumbar disc disease with radiculopathy    Spinal stenosis of lumbar region with neurogenic claudication    Anxiety    Pain in joint, shoulder region    Bilateral hip pain    Bipolar II disorder ([address] )    Cognitive disorder    Esophageal reflux    Fatigue    Female pelvic pain    Generalized anxiety disorder    Hypertension    Hypokalemia    Insomnia    Major depressive disorder, recurrent episode, moderate degree (HCC)    Migraine    Opioid dependence ([address] )    Osteoarthritis of both hips    Osteoarthritis of knee    Overactive bladder    Pain, joint, hip    Panic disorder without agoraphobia    Post traumatic stress disorder    Primary localized osteoarthritis of both knees    Recent unexplained weight loss    Sacroiliitis (HCC)    Tremor    Urinary incontinence    Vitamin D deficiency    Post laminectomy syndrome    Encounter for long-term use of opiate analgesic    Family history of colorectal cancer       Objective     /90 (BP Location: Left arm, Patient Position: Sitting, Cuff Size: Adult)   Pulse 65   Temp 98 3 °F (36 8 °C) (Oral)   Ht 5' 1" (1 549 m)   Wt 88 kg (194 lb 0 1 oz)   SpO2 96%   BMI 36 66 kg/m²     Physical Exam   Constitutional: She is oriented to person, place, and time  She appears well-developed and well-nourished  No distress  HENT:   Head: Normocephalic and atraumatic  Nose: Nose normal    Mouth/Throat: Oropharynx is clear and moist  No oropharyngeal exudate  Eyes: Pupils are equal, round, and reactive to light  EOM are normal  Right eye exhibits no discharge  Left eye exhibits no discharge  No scleral icterus  Neck: Normal range of motion  Neck supple  No JVD present  Cardiovascular: Normal rate, regular rhythm, normal heart sounds and intact distal pulses  Exam reveals no gallop and no friction rub  No murmur heard  Pulmonary/Chest: Effort normal and breath sounds normal  No respiratory distress  She has no wheezes  She has no rales  She exhibits no tenderness  Abdominal: Soft  Bowel sounds are normal  She exhibits no distension and no mass  There is no tenderness  There is no rebound and no guarding  Musculoskeletal: Normal range of motion  She exhibits no edema, tenderness or deformity  Lymphadenopathy:     She has no cervical adenopathy  Neurological: She is alert and oriented to person, place, and time  She has normal reflexes  Skin: Skin is warm and dry  No rash noted  She is not diaphoretic  No erythema  No pallor     Psychiatric: She has a normal mood and affect  Her behavior is normal        Pertinent Laboratory/Diagnostic Studies:  CBC:   Lab Results   Component Value Date/Time    WBC 4 50 10/26/2015 11:23 AM    RBC 4 80 10/26/2015 11:23 AM    HGB 14 9 10/26/2015 11:23 AM    HCT 39 7 02/16/2016 12:16 PM    HCT 41 9 10/26/2015 11:23 AM    MCV 87 10/26/2015 11:23 AM    MCH 31 0 10/26/2015 11:23 AM    MCHC 35 6 10/26/2015 11:23 AM    RDW 12 9 10/26/2015 11:23 AM    MPV 9 5 10/26/2015 11:23 AM     10/26/2015 11:23 AM    NRBC 0 10/26/2015 11:23 AM    NEUTOPHILPCT 54 10/26/2015 11:23 AM    LYMPHOPCT 36 10/26/2015 11:23 AM    MONOPCT 8 10/26/2015 11:23 AM    EOSPCT 1 10/26/2015 11:23 AM    BASOPCT 1 10/26/2015 11:23 AM    NEUTROABS 2 43 10/26/2015 11:23 AM    LYMPHSABS 1 62 10/26/2015 11:23 AM    MONOSABS 0 36 10/26/2015 11:23 AM    EOSABS 0 05 10/26/2015 11:23 AM     CBC: No results for input(s): WBC, RBC, HGB, HCT, MCV, MCH, MCHC, RDW, MPV, PLT, NRBC, NEUTOPHILPCT, LYMPHOPCT, MONOPCT, EOSPCT, BASOPCT, NEUTROABS, LYMPHSABS, MONOSABS, EOSABS, MONOSABS in the last 8784 hours      Current Medications     Current Outpatient Medications:     acetaminophen (TYLENOL) 500 mg tablet, Take 500 mg by mouth every 6 (six) hours as needed for mild pain, Disp: , Rfl:     amLODIPine (NORVASC) 5 mg tablet, Take 1 tablet (5 mg total) by mouth daily, Disp: 60 tablet, Rfl: 0    aspirin 81 MG tablet, Take 1 tablet (81 mg total) by mouth daily, Disp: 30 tablet, Rfl: 3    busPIRone (BUSPAR) 5 mg tablet, TAKE 1 TABLET (5 MG TOTAL) BY MOUTH 3 (THREE) TIMES A DAY, Disp: 90 tablet, Rfl: 0    cetirizine (ZyrTEC) 10 mg tablet, , Disp: , Rfl: 1    Cholecalciferol (VITAMIN D3) 1000 units CAPS, Take 1 capsule (1,000 Units total) by mouth daily, Disp: 90 capsule, Rfl: 0    DULoxetine (CYMBALTA) 30 mg delayed release capsule, Take 30 mg by mouth daily, Disp: , Rfl:     DULoxetine (CYMBALTA) 60 mg delayed release capsule, Take 1 capsule (60 mg total) by mouth daily, Disp: 90 capsule, Rfl: 0    hydrOXYzine HCL (ATARAX) 25 mg tablet, Take 25 mg by mouth every 8 (eight) hours as needed, Disp: , Rfl:     ibuprofen (MOTRIN) 600 mg tablet, Take 600 mg by mouth every 6 (six) hours as needed for mild pain, Disp: , Rfl:     lactulose (CEPHULAC) 10 g packet, Take 10 g by mouth 3 (three) times a day, Disp: , Rfl:     LORazepam (ATIVAN) 0 5 mg tablet, Take 0 5 mg by mouth every 8 (eight) hours as needed, Disp: , Rfl:     lubiprostone (AMITIZA) 24 mcg capsule, Take 1 capsule (24 mcg total) by mouth 2 (two) times a day with meals, Disp: 30 capsule, Rfl: 3    meloxicam (MOBIC) 15 mg tablet, Take 15 mg by mouth daily , Disp: , Rfl:     morphine (MS CONTIN) 15 mg 12 hr tablet, Take 1 tablet (15 mg total) by mouth 2 (two) times a dayMax Daily Amount: 30 mg, Disp: 56 tablet, Rfl: 0    omeprazole (PriLOSEC) 20 mg delayed release capsule, Take 1 capsule (20 mg total) by mouth daily, Disp: 60 capsule, Rfl: 0    oxybutynin (DITROPAN-XL) 10 MG 24 hr tablet, Take 1 tablet (10 mg total) by mouth daily at bedtime, Disp: 90 tablet, Rfl: 0    polyethylene glycol (MIRALAX) 17 g, Take by mouth, Disp: , Rfl:     pregabalin (LYRICA) 75 mg capsule, Take 1 capsule (75 mg total) by mouth 3 (three) times a day, Disp: 90 capsule, Rfl: 1    primidone (MYSOLINE) 250 mg tablet, Take 1 tablet (250 mg total) by mouth daily at bedtime, Disp: 90 tablet, Rfl: 0    propranolol (INDERAL) 20 mg tablet, Take 1 tablet (20 mg total) by mouth 2 (two) times a day, Disp: 120 tablet, Rfl: 0    QUEtiapine (SEROquel XR) 300 mg 24 hr tablet, Take 1 tablet (300 mg total) by mouth daily at bedtime, Disp: 60 tablet, Rfl: 1    senna-docusate sodium (SENOKOT-S) 8 6-50 mg per tablet, Take 1 tablet by mouth daily, Disp: , Rfl:     SUMAtriptan (IMITREX) 50 mg tablet, Take 1 tablet (50 mg total) by mouth once as needed for migraine for up to 1 dose, Disp: 10 tablet, Rfl: 0    Valbenazine Tosylate 80 MG CAPS, Take 80 mg by mouth daily, Disp: , Rfl:     prazosin (MINIPRESS) 1 mg capsule, Take 1 capsule (1 mg total) by mouth daily at bedtime, Disp: 90 capsule, Rfl: 0    Health Maintenance     Health Maintenance   Topic Date Due    CRC Screening: Colonoscopy  1965    HIV Screening  12/26/1978    BMI: Followup Plan  12/26/1981    Annual Physical  12/26/1981    Cervical Cancer Screening  12/26/1984    MAMMOGRAM  10/10/2014    Influenza Vaccine  07/01/2019    BMI: Adult  01/14/2021    DTaP,Tdap,and Td Vaccines (2 - Td) 07/25/2024    Pneumococcal Vaccine: 65+ Years (1 of 2 - PCV13) 12/26/2028    Hepatitis C Screening  Completed    Pneumococcal Vaccine: Pediatrics (0 to 5 Years) and At-Risk Patients (6 to 59 Years)  Aged Out    HIB Vaccine  Aged Out    Hepatitis B Vaccine  Aged Out    IPV Vaccine  Aged Out    Hepatitis A Vaccine  Aged Out    Meningococcal ACWY Vaccine  Aged Out    HPV Vaccine  Aged [employer] History   Administered Date(s) Administered    Influenza TIV (IM) 10/02/2013, 11/20/2014, 10/26/2015    Tdap 07/25/2014       Eileen QUINONEZ  Internal Medicine PGY-3  1/15/2020 1:33 PM

## 2020-01-16 ENCOUNTER — TELEPHONE (OUTPATIENT)
Dept: INTERNAL MEDICINE CLINIC | Facility: CLINIC | Age: 55
End: 2020-01-16

## 2020-01-16 DIAGNOSIS — B36.9 FUNGAL DERMATITIS: Primary | ICD-10-CM

## 2020-01-16 RX ORDER — CLOTRIMAZOLE 1 %
CREAM (GRAM) TOPICAL 2 TIMES DAILY
Qty: 30 G | Refills: 0 | Status: ON HOLD | OUTPATIENT
Start: 2020-01-16 | End: 2020-03-08

## 2020-01-16 NOTE — TELEPHONE ENCOUNTER
Patient calling to request medication that can treat vaginal odor and discomfort  Patient states she mentioned it to provider yesterday and nothing was sent to pharmacy  [facility] confirmed

## 2020-01-16 NOTE — TELEPHONE ENCOUNTER
Camilo Muller from [employer] calling on behalf of patient  Camilo Muller would like to know what would be the difference between patient having a Home study vs a sleep study  As per Camilo Muller, patient was referred to have a home study, however; those appointments are late at night and patient has limited ride access       Camilo Muller [facility]): [phone]

## 2020-01-16 NOTE — TELEPHONE ENCOUNTER
BART FROM [facility] CALLED IN REQUESTING WE FAX OVER OFFICE VISIT NOTE THAT SHOWS     WAS DISCONTINUED AND PRAZOSIN HCI WAS STARTED  STATED WITHOUT THE OFFICE NOTE SHE CAN NOT START NEW MEDICATION AND DISCONTINUE THE TAMSULOSIN IN HER SYSTEM  REQUESTED WE FAX OFFICE NOTE  [phone]    ANY QUESTIONS CALL BART @ [phone]

## 2020-01-17 DIAGNOSIS — F51.5 NIGHTMARES: Primary | ICD-10-CM

## 2020-01-17 NOTE — TELEPHONE ENCOUNTER
Called Isabella and she state that her question was if patient can have the sleep study in their facility

## 2020-01-20 DIAGNOSIS — M54.16 LUMBAR RADICULOPATHY: ICD-10-CM

## 2020-01-20 DIAGNOSIS — G89.4 CHRONIC PAIN DISORDER: ICD-10-CM

## 2020-01-21 NOTE — TELEPHONE ENCOUNTER
Jarret Galicia from [facility] called to advise the patient is due for her Morphine tomorrow 1/22/20 as the last rx was only sent for 28 day supply   Will put out for attending on 1/22/20

## 2020-01-22 ENCOUNTER — TELEPHONE (OUTPATIENT)
Dept: SLEEP CENTER | Facility: CLINIC | Age: 55
End: 2020-01-22

## 2020-01-22 RX ORDER — MORPHINE SULFATE 15 MG/1
15 TABLET, FILM COATED, EXTENDED RELEASE ORAL 2 TIMES DAILY
Qty: 56 TABLET | Refills: 0 | Status: SHIPPED | OUTPATIENT
Start: 2020-01-22 | End: 2020-02-20 | Stop reason: SDUPTHER

## 2020-01-22 NOTE — TELEPHONE ENCOUNTER
----- Message from Mikaela Llamas DO sent at 1/21/2020  7:32 AM EST -----  Chart reviewed  Study approved   ----- Message -----  From: Matilde Harper MA  Sent: 1/17/2020  11:45 AM EST  To: Sleep Medicine [facility] Provider    This sleep study needs approval      If approved please sign and return to clerical pool  If denied please include reasons why  Also provide alternative testing if warranted  Please sign and return to clerical pool

## 2020-01-28 ENCOUNTER — HOSPITAL ENCOUNTER (OUTPATIENT)
Dept: RADIOLOGY | Facility: HOSPITAL | Age: 55
Discharge: HOME | End: 2020-01-28
Attending: NEUROLOGICAL SURGERY
Payer: MEDICARE

## 2020-01-28 ENCOUNTER — TRANSCRIBE ORDERS (OUTPATIENT)
Dept: RADIOLOGY | Facility: HOSPITAL | Age: 55
End: 2020-01-28

## 2020-01-28 DIAGNOSIS — M54.41 CHRONIC BILATERAL LOW BACK PAIN WITH BILATERAL SCIATICA: ICD-10-CM

## 2020-01-28 DIAGNOSIS — G89.29 CHRONIC BILATERAL LOW BACK PAIN WITH BILATERAL SCIATICA: ICD-10-CM

## 2020-01-28 DIAGNOSIS — M54.42 CHRONIC BILATERAL LOW BACK PAIN WITH BILATERAL SCIATICA: ICD-10-CM

## 2020-01-28 PROCEDURE — 72146 MRI CHEST SPINE W/O DYE: CPT

## 2020-01-28 PROCEDURE — 72082 X-RAY EXAM ENTIRE SPI 2/3 VW: CPT

## 2020-01-30 ENCOUNTER — OFFICE VISIT (OUTPATIENT)
Dept: PAIN MEDICINE | Facility: CLINIC | Age: 55
End: 2020-01-30
Payer: MEDICARE

## 2020-01-30 VITALS
HEIGHT: 61 IN | SYSTOLIC BLOOD PRESSURE: 131 MMHG | HEART RATE: 66 BPM | WEIGHT: 195 LBS | DIASTOLIC BLOOD PRESSURE: 91 MMHG | BODY MASS INDEX: 36.82 KG/M2

## 2020-01-30 DIAGNOSIS — M54.16 LUMBAR RADICULOPATHY: ICD-10-CM

## 2020-01-30 DIAGNOSIS — G89.4 CHRONIC PAIN DISORDER: Primary | ICD-10-CM

## 2020-01-30 DIAGNOSIS — M47.816 LUMBAR SPONDYLOSIS: ICD-10-CM

## 2020-01-30 DIAGNOSIS — M96.1 POST LAMINECTOMY SYNDROME: ICD-10-CM

## 2020-01-30 PROCEDURE — 99214 OFFICE O/P EST MOD 30 MIN: CPT | Performed by: NURSE PRACTITIONER

## 2020-01-30 RX ORDER — TRAZODONE HYDROCHLORIDE 100 MG/1
TABLET ORAL
COMMUNITY
Start: 2020-01-27 | End: 2020-03-19 | Stop reason: HOSPADM

## 2020-01-30 RX ORDER — PREGABALIN 100 MG/1
100 CAPSULE ORAL 3 TIMES DAILY
Qty: 90 CAPSULE | Refills: 2 | Status: SHIPPED | OUTPATIENT
Start: 2020-01-30 | End: 2020-03-25 | Stop reason: SDUPTHER

## 2020-01-30 NOTE — PROGRESS NOTES
Assessment:  1  Chronic pain disorder    2  Lumbar radiculopathy    3  Lumbar spondylosis    4  Post laminectomy syndrome        Plan:  1  Will increase Lyrica to 100 mg t i d  For her ongoing and worsening pain complaints  I advised the patient that if they experience any side effects or issues with the changes in their medication regiment, they should give our office a call to discuss  I also advised the patient not to drive or operate machinery until they see how the changes in the medication regimen affects them  The patient was agreeable and verbalized an understanding  2  Patient will continue to follow with Neurosurgery to discuss her options as far as whether she is a candidate for a spinal cord stimulator trial   She did recently have an MRI of the thoracic spine completed which has not yet been read by the radiologist to see if a stimulator trial would be possible  She is scheduled to see Dr Tran Martini at the end of February  3  Opioids per patient's PCP  4  Patient may continue duloxetine, cyclobenzaprine and meloxicam as prescribed  5  The patient will follow-up in 8 weeks for medication prescription refill and reevaluation  The patient was advised to contact the office should their symptoms worsen in the interim  The patient was agreeable and verbalized an understanding  [place] Prescription Drug Monitoring Program report was reviewed and was appropriate     M*Modal software was used to dictate this note  It may contain errors with dictating incorrect words or incorrect spelling  Please contact the provider directly with any questions  History of Present Illness:     The patient is a 62 y o  female with a history of a T7-S1 fusion for scoliosis correction as well as subsequent C4-5 ACDF last seen on 12/3/19 who presents for a follow up office visit in regards to chronic pain secondary to lumbar post-laminectomy syndrome, fibromyalgia, lumbar spondylosis and stenosis, and chronic pain syndrome  The patient denies bowel or bladder incontinence or saddle anesthesia  At today's office visit, the patient states her pain continues to worsen  She is in the process of establishing with Neurosurgery to see if she would be a candidate for spinal cord stimulator trial   Considering her extensive back surgeries, this is unclear  She did recently have an MRI of the thoracic spine, however this has not yet been read  She has tried and failed epidural steroid injections, SI joint injections and has now had an extension of her thoracolumbar fusion without any relief  She has been on high-dose opioid medications in the past and is currently wean down to morphine ER 15 mg b i d  By her PCP, which she reports 40% relief  The patient currently rates her pain a 10/10 on the numeric pain rating scale  She states her pain is constant nature bothersome the morning and at night  She characterizes pain as dull aching, throbbing and numbness  I have personally reviewed and/or updated the patient's past medical history, past surgical history, family history, social history, current medications, allergies, and vital signs today  Review of Systems:    Review of Systems   Respiratory: Negative for shortness of breath  Cardiovascular: Negative for chest pain  Gastrointestinal: Negative for constipation, diarrhea, nausea and vomiting  Musculoskeletal: Positive for gait problem  Negative for arthralgias, joint swelling and myalgias  Joint stiffness   Skin: Negative for rash  Neurological: Positive for weakness  Negative for dizziness and seizures  All other systems reviewed and are negative  Past Medical History:   Diagnosis Date    Acid reflux     Anxiety     RESOLVED: [id number]    Arthritis     Bipolar 2 disorder (HCC)     FOLLOWS WITH PSYCHIATRIST   CONTINUE LAMOTRIGINE; RESOLVED: [id number]    Depression     Familial tremor     both hands    Fibromyalgia     LAST ASSESSED: [id number]    Hearing aid worn     left ear    [place] (hard of hearing)     left ear    Hypertension     Left-sided weakness     Lower back pain     Memory loss of unknown cause     long and short term    Migraine     Overactive bladder     Panic attack     Post traumatic stress disorder     Seasonal allergies     Stroke [facility])     questionable stroke 2009    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    CONT ASA 81 MG DAILY; RESOLVED: [id number]    Urinary incontinence     Wears dentures     partial lower / full upper    Wears glasses        Past Surgical History:   Procedure Laterality Date    BACK SURGERY       SECTION      COLONOSCOPY      RESOLVED: [id number]    EAR SURGERY      HYSTERECTOMY  2004    MYRINGOTOMY W/ TUBES Left     NECK SURGERY  2019    MT CYSTOURETHROSCOPY N/A 2016    Procedure: CYSTOSCOPY, BOTOX INJECTION;  Surgeon: Katelynn Ford MD;  Location: [facility];  Service: Gynecology    TONSILLECTOMY      TUBAL LIGATION         Family History   Problem Relation Age of Onset    Colon cancer Mother     Alzheimer's disease Father     Stroke Father     Colon cancer Brother     Bipolar disorder Brother     Breast cancer Maternal Aunt     Heart disease Other     Diabetes Other     Hypertension Other     Seizures Son        Social History     Occupational History    Occupation: Disabled   Tobacco Use    Smoking status: Never Smoker    Smokeless tobacco: Never Used   Substance and Sexual Activity    Alcohol use: No     Comment: 2 x year; being a social drinker as per all scripts     Drug use: No    Sexual activity: Not on file         Current Outpatient Medications:     acetaminophen (TYLENOL) 500 mg tablet, Take 500 mg by mouth every 6 (six) hours as needed for mild pain, Disp: , Rfl:     amLODIPine (NORVASC) 5 mg tablet, Take 1 tablet (5 mg total) by mouth daily, Disp: 60 tablet, Rfl: 0    aspirin 81 MG tablet, Take 1 tablet (81 mg total) by mouth daily, Disp: 30 tablet, Rfl: 3    busPIRone (BUSPAR) 5 mg tablet, TAKE 1 TABLET (5 MG TOTAL) BY MOUTH 3 (THREE) TIMES A DAY, Disp: 90 tablet, Rfl: 0    cetirizine (ZyrTEC) 10 mg tablet, , Disp: , Rfl: 1    Cholecalciferol (VITAMIN D3) 1000 units CAPS, Take 1 capsule (1,000 Units total) by mouth daily, Disp: 90 capsule, Rfl: 0    clotrimazole (LOTRIMIN) 1 % cream, Apply topically 2 (two) times a day, Disp: 30 g, Rfl: 0    DULoxetine (CYMBALTA) 30 mg delayed release capsule, Take 30 mg by mouth daily, Disp: , Rfl:     DULoxetine (CYMBALTA) 60 mg delayed release capsule, Take 1 capsule (60 mg total) by mouth daily, Disp: 90 capsule, Rfl: 0    hydrOXYzine HCL (ATARAX) 25 mg tablet, Take 25 mg by mouth every 8 (eight) hours as needed, Disp: , Rfl:     ibuprofen (MOTRIN) 600 mg tablet, Take 600 mg by mouth every 6 (six) hours as needed for mild pain, Disp: , Rfl:     lactulose (CEPHULAC) 10 g packet, Take 10 g by mouth 3 (three) times a day, Disp: , Rfl:     LORazepam (ATIVAN) 0 5 mg tablet, Take 0 5 mg by mouth every 8 (eight) hours as needed, Disp: , Rfl:     lubiprostone (AMITIZA) 24 mcg capsule, Take 1 capsule (24 mcg total) by mouth 2 (two) times a day with meals, Disp: 30 capsule, Rfl: 3    meloxicam (MOBIC) 15 mg tablet, Take 15 mg by mouth daily , Disp: , Rfl:     morphine (MS CONTIN) 15 mg 12 hr tablet, Take 1 tablet (15 mg total) by mouth 2 (two) times a dayMax Daily Amount: 30 mg, Disp: 56 tablet, Rfl: 0    omeprazole (PriLOSEC) 20 mg delayed release capsule, Take 1 capsule (20 mg total) by mouth daily, Disp: 60 capsule, Rfl: 0    oxybutynin (DITROPAN-XL) 10 MG 24 hr tablet, Take 1 tablet (10 mg total) by mouth daily at bedtime, Disp: 90 tablet, Rfl: 0    polyethylene glycol (MIRALAX) 17 g, Take by mouth, Disp: , Rfl:     prazosin (MINIPRESS) 1 mg capsule, Take 1 capsule (1 mg total) by mouth daily at bedtime, Disp: 90 capsule, Rfl: 0    primidone (MYSOLINE) 250 mg tablet, Take 1 tablet (250 mg total) by mouth daily at bedtime, Disp: 90 tablet, Rfl: 0    propranolol (INDERAL) 20 mg tablet, Take 1 tablet (20 mg total) by mouth 2 (two) times a day, Disp: 120 tablet, Rfl: 0    QUEtiapine (SEROquel XR) 300 mg 24 hr tablet, Take 1 tablet (300 mg total) by mouth daily at bedtime, Disp: 60 tablet, Rfl: 1    senna-docusate sodium (SENOKOT-S) 8 6-50 mg per tablet, Take 1 tablet by mouth daily, Disp: , Rfl:     SUMAtriptan (IMITREX) 50 mg tablet, Take 1 tablet (50 mg total) by mouth once as needed for migraine for up to 1 dose, Disp: 10 tablet, Rfl: 0    traZODone (DESYREL) 100 mg tablet, Take 50mg at night for a week, then increase to 100mg at night for a week, then increase to 150mg at night, Disp: , Rfl:     Valbenazine Tosylate 80 MG CAPS, Take 80 mg by mouth daily, Disp: , Rfl:     pregabalin (LYRICA) 100 mg capsule, Take 1 capsule (100 mg total) by mouth 3 (three) times a day, Disp: 90 capsule, Rfl: 2    No Known Allergies    Physical Exam:    /91   Pulse 66   Ht 5' 1" (1 549 m)   Wt 88 5 kg (195 lb)   BMI 36 84 kg/m²     Constitutional:normal, well developed, well nourished, alert, in no distress and non-toxic and no overt pain behavior  Eyes:anicteric  HEENT:grossly intact  Neck:supple, symmetric, trachea midline and no masses   Pulmonary:even and unlabored  Cardiovascular:No edema or pitting edema present  Skin:Normal without rashes or lesions and well hydrated  Psychiatric:Mood and affect appropriate  Neurologic:Cranial Nerves II-XII grossly intact  Musculoskeletal:antalgic gait but steady with the use of a walker      Imaging  No orders to display         No orders of the defined types were placed in this encounter

## 2020-02-06 ENCOUNTER — CONSULT (OUTPATIENT)
Dept: BARIATRICS | Facility: CLINIC | Age: 55
End: 2020-02-06
Payer: COMMERCIAL

## 2020-02-06 VITALS
BODY MASS INDEX: 42.61 KG/M2 | TEMPERATURE: 98.4 F | HEART RATE: 78 BPM | SYSTOLIC BLOOD PRESSURE: 118 MMHG | DIASTOLIC BLOOD PRESSURE: 72 MMHG | WEIGHT: 203 LBS | HEIGHT: 58 IN

## 2020-02-06 DIAGNOSIS — M51.16 LUMBAR DISC DISEASE WITH RADICULOPATHY: ICD-10-CM

## 2020-02-06 DIAGNOSIS — I10 HYPERTENSION, UNSPECIFIED TYPE: ICD-10-CM

## 2020-02-06 DIAGNOSIS — R79.89 ABNORMAL SERUM THYROXINE (T4) LEVEL: ICD-10-CM

## 2020-02-06 DIAGNOSIS — E66.01 MORBID OBESITY WITH BMI OF 40.0-44.9, ADULT (HCC): Primary | ICD-10-CM

## 2020-02-06 DIAGNOSIS — R73.9 HYPERGLYCEMIA: ICD-10-CM

## 2020-02-06 DIAGNOSIS — F33.1 MAJOR DEPRESSIVE DISORDER, RECURRENT EPISODE, MODERATE DEGREE (HCC): ICD-10-CM

## 2020-02-06 PROCEDURE — 99204 OFFICE O/P NEW MOD 45 MIN: CPT | Performed by: PHYSICIAN ASSISTANT

## 2020-02-06 RX ORDER — CLINDAMYCIN PHOSPHATE 20 MG/G
1 CREAM VAGINAL
COMMUNITY
Start: 2020-01-30 | End: 2020-02-06

## 2020-02-06 NOTE — PATIENT INSTRUCTIONS
No sugary beverages  At least 64oz of water daily   Can try crystal light, propel, powerade/gatorade zero  Attend seminar or watch online seminar  Recommend checking lab coverage before having labs drawn

## 2020-02-11 ENCOUNTER — PATIENT OUTREACH (OUTPATIENT)
Dept: INTERNAL MEDICINE CLINIC | Facility: CLINIC | Age: 55
End: 2020-02-11

## 2020-02-11 NOTE — PROGRESS NOTES
Outpatient Care Management Note:    Called patient no answer  Message left this is Flower Rm the nurse care manager calling from her primary care doctor's office to see how she is feeling and managing at this time   I did remind her she has an appointment this week with her PCP on Thursday 2/13 @ 9:30 am  I requested a call back at [phone] and did state I am available M-F 8 am-4:30 pm      Patient does have sleep study scheduled for 4/5 @ 8pm  Patient seeing [facility] and has follow up with Neurosugery on 2/24  Patient has mammogram scheduled for 3/10     Patient to be following with mental health

## 2020-02-13 ENCOUNTER — TELEPHONE (OUTPATIENT)
Dept: SLEEP CENTER | Facility: CLINIC | Age: 55
End: 2020-02-13

## 2020-02-13 ENCOUNTER — APPOINTMENT (OUTPATIENT)
Dept: LAB | Facility: CLINIC | Age: 55
End: 2020-02-13
Payer: COMMERCIAL

## 2020-02-13 ENCOUNTER — PATIENT OUTREACH (OUTPATIENT)
Dept: INTERNAL MEDICINE CLINIC | Facility: CLINIC | Age: 55
End: 2020-02-13

## 2020-02-13 ENCOUNTER — OFFICE VISIT (OUTPATIENT)
Dept: INTERNAL MEDICINE CLINIC | Facility: CLINIC | Age: 55
End: 2020-02-13

## 2020-02-13 VITALS
TEMPERATURE: 98.8 F | SYSTOLIC BLOOD PRESSURE: 122 MMHG | BODY MASS INDEX: 42.76 KG/M2 | OXYGEN SATURATION: 95 % | DIASTOLIC BLOOD PRESSURE: 84 MMHG | WEIGHT: 203.71 LBS | HEART RATE: 68 BPM | HEIGHT: 58 IN

## 2020-02-13 DIAGNOSIS — E66.01 MORBID OBESITY WITH BMI OF 40.0-44.9, ADULT (HCC): ICD-10-CM

## 2020-02-13 DIAGNOSIS — I10 HYPERTENSION: ICD-10-CM

## 2020-02-13 DIAGNOSIS — R73.9 HYPERGLYCEMIA: ICD-10-CM

## 2020-02-13 DIAGNOSIS — I10 HYPERTENSION, UNSPECIFIED TYPE: ICD-10-CM

## 2020-02-13 DIAGNOSIS — R79.89 ABNORMAL SERUM THYROXINE (T4) LEVEL: ICD-10-CM

## 2020-02-13 DIAGNOSIS — R31.29 OTHER MICROSCOPIC HEMATURIA: Primary | ICD-10-CM

## 2020-02-13 DIAGNOSIS — R79.89 ABNORMAL SERUM THYROXINE (T4) LEVEL: Primary | ICD-10-CM

## 2020-02-13 DIAGNOSIS — Z00.00 HEALTHCARE MAINTENANCE: ICD-10-CM

## 2020-02-13 DIAGNOSIS — E87.6 HYPOKALEMIA: ICD-10-CM

## 2020-02-13 PROBLEM — R41.89: Status: ACTIVE | Noted: 2020-02-13

## 2020-02-13 LAB
ANION GAP SERPL CALCULATED.3IONS-SCNC: 4 MMOL/L (ref 4–13)
BACTERIA UR QL AUTO: ABNORMAL /HPF
BILIRUB UR QL STRIP: NEGATIVE
BUN SERPL-MCNC: 8 MG/DL (ref 5–25)
CALCIUM SERPL-MCNC: 8.5 MG/DL (ref 8.3–10.1)
CHLORIDE SERPL-SCNC: 101 MMOL/L (ref 100–108)
CHOLEST SERPL-MCNC: 215 MG/DL (ref 50–200)
CLARITY UR: CLEAR
CO2 SERPL-SCNC: 33 MMOL/L (ref 21–32)
COLOR UR: ABNORMAL
CREAT SERPL-MCNC: 0.7 MG/DL (ref 0.6–1.3)
EST. AVERAGE GLUCOSE BLD GHB EST-MCNC: 91 MG/DL
GFR SERPL CREATININE-BSD FRML MDRD: 112 ML/MIN/1.73SQ M
GLUCOSE P FAST SERPL-MCNC: 91 MG/DL (ref 65–99)
GLUCOSE UR STRIP-MCNC: NEGATIVE MG/DL
HBA1C MFR BLD: 4.8 %
HDLC SERPL-MCNC: 84 MG/DL
HGB UR QL STRIP.AUTO: NEGATIVE
KETONES UR STRIP-MCNC: NEGATIVE MG/DL
LDLC SERPL CALC-MCNC: 116 MG/DL (ref 0–100)
LEUKOCYTE ESTERASE UR QL STRIP: ABNORMAL
NITRITE UR QL STRIP: NEGATIVE
NON-SQ EPI CELLS URNS QL MICRO: ABNORMAL /HPF
NONHDLC SERPL-MCNC: 131 MG/DL
PH UR STRIP.AUTO: 7 [PH]
POTASSIUM SERPL-SCNC: 3.9 MMOL/L (ref 3.5–5.3)
PROT UR STRIP-MCNC: NEGATIVE MG/DL
RBC #/AREA URNS AUTO: ABNORMAL /HPF
SODIUM SERPL-SCNC: 138 MMOL/L (ref 136–145)
SP GR UR STRIP.AUTO: 1.02 (ref 1–1.03)
TRIGL SERPL-MCNC: 74 MG/DL
TSH SERPL DL<=0.05 MIU/L-ACNC: 1.48 UIU/ML (ref 0.36–3.74)
UROBILINOGEN UR QL STRIP.AUTO: 1 E.U./DL
WBC #/AREA URNS AUTO: ABNORMAL /HPF

## 2020-02-13 PROCEDURE — 3008F BODY MASS INDEX DOCD: CPT | Performed by: INTERNAL MEDICINE

## 2020-02-13 PROCEDURE — 1036F TOBACCO NON-USER: CPT | Performed by: INTERNAL MEDICINE

## 2020-02-13 PROCEDURE — 3074F SYST BP LT 130 MM HG: CPT | Performed by: INTERNAL MEDICINE

## 2020-02-13 PROCEDURE — 80048 BASIC METABOLIC PNL TOTAL CA: CPT

## 2020-02-13 PROCEDURE — 36415 COLL VENOUS BLD VENIPUNCTURE: CPT

## 2020-02-13 PROCEDURE — 80061 LIPID PANEL: CPT

## 2020-02-13 PROCEDURE — 84443 ASSAY THYROID STIM HORMONE: CPT

## 2020-02-13 PROCEDURE — 99213 OFFICE O/P EST LOW 20 MIN: CPT | Performed by: INTERNAL MEDICINE

## 2020-02-13 PROCEDURE — 3079F DIAST BP 80-89 MM HG: CPT | Performed by: INTERNAL MEDICINE

## 2020-02-13 PROCEDURE — 83036 HEMOGLOBIN GLYCOSYLATED A1C: CPT

## 2020-02-13 PROCEDURE — 84439 ASSAY OF FREE THYROXINE: CPT

## 2020-02-13 PROCEDURE — 81001 URINALYSIS AUTO W/SCOPE: CPT | Performed by: STUDENT IN AN ORGANIZED HEALTH CARE EDUCATION/TRAINING PROGRAM

## 2020-02-13 RX ORDER — AMLODIPINE BESYLATE 10 MG/1
TABLET ORAL
COMMUNITY
End: 2020-03-19 | Stop reason: HOSPADM

## 2020-02-13 RX ORDER — POTASSIUM CHLORIDE 750 MG/1
TABLET, FILM COATED, EXTENDED RELEASE ORAL
COMMUNITY
End: 2020-03-19 | Stop reason: HOSPADM

## 2020-02-13 RX ORDER — CLONAZEPAM 1 MG/1
TABLET ORAL
COMMUNITY
Start: 2013-11-29 | End: 2020-03-19 | Stop reason: HOSPADM

## 2020-02-13 RX ORDER — TRAZODONE HYDROCHLORIDE 100 MG/1
TABLET ORAL
COMMUNITY
End: 2020-03-19 | Stop reason: HOSPADM

## 2020-02-13 RX ORDER — VENLAFAXINE HYDROCHLORIDE 150 MG/1
TABLET, EXTENDED RELEASE ORAL
COMMUNITY
End: 2020-03-19 | Stop reason: HOSPADM

## 2020-02-13 RX ORDER — PRIMIDONE 250 MG/1
TABLET ORAL
COMMUNITY
Start: 2015-04-02 | End: 2020-03-19 | Stop reason: HOSPADM

## 2020-02-13 RX ORDER — VENLAFAXINE HYDROCHLORIDE 75 MG/1
TABLET, EXTENDED RELEASE ORAL
COMMUNITY
End: 2020-03-19 | Stop reason: HOSPADM

## 2020-02-13 RX ORDER — LAMOTRIGINE 100 MG/1
TABLET ORAL
COMMUNITY
End: 2020-03-19 | Stop reason: HOSPADM

## 2020-02-13 RX ORDER — TRAMADOL HYDROCHLORIDE 50 MG/1
TABLET ORAL
COMMUNITY
End: 2020-03-19 | Stop reason: HOSPADM

## 2020-02-13 RX ORDER — RISPERIDONE 2 MG/1
TABLET, FILM COATED ORAL
COMMUNITY
End: 2020-03-19 | Stop reason: HOSPADM

## 2020-02-13 RX ORDER — OXYBUTYNIN CHLORIDE 5 MG/1
TABLET ORAL
COMMUNITY
End: 2020-03-19 | Stop reason: HOSPADM

## 2020-02-13 NOTE — PROGRESS NOTES
Outpatient Care Management Note:    Patient is at PCP office today  I met with patient after her visit today  She reports she is doing well at this time  Patient is ambulating with her rollator  Patient does mention about her chronic pain and is seeing [facility] and Neurosurgery  Patient reminded of her appointment on 2/24 with Neurosurgery  Patient reports she would like to have gastric sleeve surgery done and will be going to an information session and wanting to the process for having the surgery  I did remind patient it is very important that she continues to follow up with her therapist and mental health provider during this process and especially afterwards if she was to get the surgery and briefly explained why  Patient is using [facility] to get to her medical appointments  She reports she is enjoying where she is currently staying at the [address] [place] [address]  and feels she is getting the necessary help and assistance she needs  They continue to manage patient's medication  Patient does not have any questions about her medication at this time  Patient is scheduled for sleep study 4/5/2020 and mammogram 3/10 @ 9:40 am      Patient will use the lab in the office today to get her bloodwork done  Patient does not have any further questions, concerns, or other needs at this time  Pt has my contact # and PCP office # if needed  Pt is agreeable for further outreach  I did give patient my card with my contact information  Patient was thankful for the outreach  Please see physician note for more information

## 2020-02-13 NOTE — TELEPHONE ENCOUNTER
----- Message from Mary Stevens MD sent at 2/12/2020  6:03 PM EST -----  Approved  ----- Message -----  From: Suri Arriaza: 2/11/2020   9:40 AM EST  To: Sleep Medicine [facility], #    PLEASE REVIEW FOR APPROVAL OR DENIAL AND WHY

## 2020-02-13 NOTE — PROGRESS NOTES
ASSESSMENT/PLAN:  Diagnoses and all orders for this visit:    Other microscopic hematuria  -     Urinalysis with microscopic  -     On 1/8/2020 pt had a POCT urinalysis with hematuria and glucosuria  -      Will obtain urinalysis with microscopy for confirmation    Morbid obesity         -    Will obtain HbA1c, lipid panel, and TSH        -     Patient saw bariatric surgery on 2/6 - patient will attend a seminar recommended by bariatric surgery and tell me at the next visit if she is still interested in bariatric surgery  -     Per bariatrics, she will need psychiatric clearance  -     Advised to lose weight; patient stated that she has an appointment with a dietician     Bilateral knee pain        -     Patient is doing PT aqua therapy  She feels that this is improving her pain  -     Encouraged to continue working with PT  Complaint related to dreams        -     Patient states that she has abnormal dreams that wake her up from sleep  She sleeps about 10 min after         -     Patient was started on prazosin 1/15/2020  She states that has been decreasing her nightly awakenings due to the dreams  She denies any side effects          -     Will continue prazosin 1 mg daily due to improvement in sleep  Health Maintenance:  Advised diet and exercise  Advised to refrain from tobacco, alcohol, illicit drug use  Advised medical compliance  Patient states that she saw a GI physician on 2/5/2020 and has an appointment for a colonoscopy in April 2020  However, the appointment and colonoscopy are not listed on the chart or care everywhere  Mammogram scheduled 3/10/2020    Schedule a follow-up appointment in 3 months  CHIEF COMPLAINT: Follow up    HISTORY OF PRESENT ILLNESS:    Patient is a 62year old female with a PMH significant for GERD, HTN, chronic bilateral low back pain w/ bilateral sciatica, cognitive disorder, bipolar II disease, morbid obesity, and fibromyalgia   Patient is coming in for follow up of bad dreams that awaken her at night  Patient states that her dreams involve requiring courses for high school or having a  baby  These dreams wake her up and it takes about 10 min to fall asleep  Per patient, prazosin has improved her sleep and decreased her awakenings at night  Patient does not have any new/acute complaints  She states that as of recently, she sleeps to 7-8 am, while previously she would awaken at 1-2 am        The following portions of the patient's history were reviewed and updated as appropriate: allergies, current medications, past family history, past medical history, past social history, past surgical history and problem list     Review of Systems   Constitutional: Negative  HENT: Negative  Eyes: Negative  Respiratory: Negative  Cardiovascular: Negative  Gastrointestinal: Negative  Endocrine: Negative  Genitourinary: Negative  Musculoskeletal: Positive for arthralgias and back pain  Skin: Negative  Allergic/Immunologic: Negative  Hematological: Negative  Psychiatric/Behavioral: Positive for sleep disturbance  OBJECTIVE:  Vitals:    20 0915   BP: 122/84   BP Location: Right arm   Patient Position: Sitting   Cuff Size: Adult   Pulse: 68   Temp: 98 8 °F (37 1 °C)   TempSrc: Oral   SpO2: 95%   Weight: 92 4 kg (203 lb 11 3 oz)   Height: 4' 10" (1 473 m)     Physical Exam   Constitutional: She is oriented to person, place, and time  She appears well-developed and well-nourished  No distress  HENT:   Head: Normocephalic and atraumatic  Eyes: Pupils are equal, round, and reactive to light  Conjunctivae and EOM are normal    Neck: Normal range of motion  Neck supple  Cardiovascular: Normal rate and regular rhythm  Exam reveals no gallop and no friction rub  No murmur heard  Pulmonary/Chest: Effort normal and breath sounds normal  No stridor  No respiratory distress  She has no wheezes  She has no rales  Abdominal: Soft  Bowel sounds are normal  She exhibits no distension and no mass  There is no tenderness  There is no rebound and no guarding  Obese abdomen   Musculoskeletal: Normal range of motion  Patient requires a walker to ambulate due to knee pain   Neurological: She is alert and oriented to person, place, and time  Skin: Skin is warm and dry  She is not diaphoretic  Psychiatric: She has a normal mood and affect   Her behavior is normal          Current Outpatient Medications:     acetaminophen (TYLENOL) 500 mg tablet, Take 500 mg by mouth every 6 (six) hours as needed for mild pain, Disp: , Rfl:     amLODIPine (NORVASC) 10 mg tablet, Take by mouth, Disp: , Rfl:     amLODIPine (NORVASC) 5 mg tablet, Take 1 tablet (5 mg total) by mouth daily, Disp: 60 tablet, Rfl: 0    aspirin 81 MG tablet, Take 1 tablet (81 mg total) by mouth daily, Disp: 30 tablet, Rfl: 3    busPIRone (BUSPAR) 5 mg tablet, TAKE 1 TABLET (5 MG TOTAL) BY MOUTH 3 (THREE) TIMES A DAY, Disp: 90 tablet, Rfl: 0    cetirizine (ZyrTEC) 10 mg tablet, , Disp: , Rfl: 1    Cholecalciferol (VITAMIN D3) 1000 units CAPS, Take 1 capsule (1,000 Units total) by mouth daily, Disp: 90 capsule, Rfl: 0    clonazePAM (KlonoPIN) 1 mg tablet, Take by mouth, Disp: , Rfl:     DULoxetine (CYMBALTA) 30 mg delayed release capsule, Take 30 mg by mouth daily, Disp: , Rfl:     DULoxetine (CYMBALTA) 60 mg delayed release capsule, Take 1 capsule (60 mg total) by mouth daily, Disp: 90 capsule, Rfl: 0    hydrOXYzine HCL (ATARAX) 25 mg tablet, Take 25 mg by mouth every 8 (eight) hours as needed, Disp: , Rfl:     ibuprofen (MOTRIN) 600 mg tablet, Take 600 mg by mouth every 6 (six) hours as needed for mild pain, Disp: , Rfl:     lactulose (CEPHULAC) 10 g packet, Take 10 g by mouth 3 (three) times a day, Disp: , Rfl:     lamoTRIgine (LaMICtal) 100 mg tablet, Take by mouth, Disp: , Rfl:     LORazepam (ATIVAN) 0 5 mg tablet, Take 0 5 mg by mouth every 8 (eight) hours as needed, Disp: , Rfl:     lubiprostone (AMITIZA) 24 mcg capsule, Take 1 capsule (24 mcg total) by mouth 2 (two) times a day with meals, Disp: 30 capsule, Rfl: 3    meloxicam (MOBIC) 15 mg tablet, Take 15 mg by mouth daily , Disp: , Rfl:     morphine (MS CONTIN) 15 mg 12 hr tablet, Take 1 tablet (15 mg total) by mouth 2 (two) times a dayMax Daily Amount: 30 mg, Disp: 56 tablet, Rfl: 0    omeprazole (PriLOSEC) 20 mg delayed release capsule, Take 1 capsule (20 mg total) by mouth daily, Disp: 60 capsule, Rfl: 0    oxybutynin (DITROPAN) 5 mg tablet, Take by mouth, Disp: , Rfl:     oxybutynin (DITROPAN-XL) 10 MG 24 hr tablet, Take 1 tablet (10 mg total) by mouth daily at bedtime, Disp: 90 tablet, Rfl: 0    polyethylene glycol (MIRALAX) 17 g, Take by mouth, Disp: , Rfl:     potassium chloride (KLOR-CON 10) 10 mEq tablet, Take by mouth, Disp: , Rfl:     prazosin (MINIPRESS) 1 mg capsule, Take 1 capsule (1 mg total) by mouth daily at bedtime (Patient taking differently: Take 2 mg by mouth daily at bedtime ), Disp: 90 capsule, Rfl: 0    pregabalin (LYRICA) 100 mg capsule, Take 1 capsule (100 mg total) by mouth 3 (three) times a day, Disp: 90 capsule, Rfl: 2    primidone (MYSOLINE) 250 mg tablet, Take 1 tablet (250 mg total) by mouth daily at bedtime, Disp: 90 tablet, Rfl: 0    primidone (MYSOLINE) 250 mg tablet, Take by mouth, Disp: , Rfl:     propranolol (INDERAL) 20 mg tablet, Take 1 tablet (20 mg total) by mouth 2 (two) times a day, Disp: 120 tablet, Rfl: 0    QUEtiapine (SEROquel XR) 300 mg 24 hr tablet, Take 1 tablet (300 mg total) by mouth daily at bedtime, Disp: 60 tablet, Rfl: 1    risperiDONE (RisperDAL) 2 mg tablet, Take by mouth, Disp: , Rfl:     senna-docusate sodium (SENOKOT-S) 8 6-50 mg per tablet, Take 1 tablet by mouth daily, Disp: , Rfl:     SUMAtriptan (IMITREX) 50 mg tablet, Take 1 tablet (50 mg total) by mouth once as needed for migraine for up to 1 dose, Disp: 10 tablet, Rfl: 0   traMADol (ULTRAM) 50 mg tablet, Take by mouth, Disp: , Rfl:     traZODone (DESYREL) 100 mg tablet, Take 50mg at night for a week, then increase to 100mg at night for a week, then increase to 150mg at night, Disp: , Rfl:     traZODone (DESYREL) 100 mg tablet, Take by mouth, Disp: , Rfl:     Valbenazine Tosylate 80 MG CAPS, Take 80 mg by mouth daily, Disp: , Rfl:     venlafaxine 150 MG TB24, Take by mouth, Disp: , Rfl:     venlafaxine 75 mg 24 hr tablet, Take by mouth, Disp: , Rfl:     clotrimazole (LOTRIMIN) 1 % cream, Apply topically 2 (two) times a day (Patient not taking: Reported on 2020), Disp: 30 g, Rfl: 0    Past Medical History:   Diagnosis Date    Acid reflux     Anxiety     RESOLVED: [id number]    Arthritis     Bipolar 2 disorder (HCC)     FOLLOWS WITH PSYCHIATRIST  CONTINUE LAMOTRIGINE; RESOLVED: [id number]    Depression     Familial tremor     both hands    Fibromyalgia     LAST ASSESSED: [id number]    Hearing aid worn     left ear    [place] (hard of hearing)     left ear    Hypertension     Left-sided weakness     Lower back pain     Memory loss of unknown cause     long and short term    Migraine     Overactive bladder     Panic attack     Post traumatic stress disorder     Seasonal allergies     Stroke 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    CONT ASA 81 MG DAILY; RESOLVED: [id number]    Urinary incontinence     Wears dentures     partial lower / full upper    Wears glasses      Past Surgical History:   Procedure Laterality Date    BACK SURGERY       SECTION      COLONOSCOPY      RESOLVED: [id number]    EAR SURGERY      HYSTERECTOMY      MYRINGOTOMY W/ TUBES Left     NECK SURGERY  2019    [place] CYSTOURETHROSCOPY N/A 2016    Procedure: CYSTOSCOPY, BOTOX INJECTION;  Surgeon: Mari Nation MD;  Location: [facility];  Service: Gynecology   [facility] Social History     Socioeconomic History    Marital status:      Spouse name: Not on file    Number of children: 2    Years of education: graduate school     Highest education level: Not on file   Occupational History    Occupation: Disabled   Social Needs    Financial resource strain: Somewhat hard    Food insecurity:     Worry: Not on file     Inability: Not on file   [employer] needs:     Medical: Not on file     Non-medical: Not on file   Tobacco Use    Smoking status: Never Smoker    Smokeless tobacco: Never Used   Substance and Sexual Activity    Alcohol use: No     Comment: 2 x year; being a social drinker as per all scripts     Drug use: No    Sexual activity: Not on file   Lifestyle    Physical activity:     Days per week: Not on file     Minutes per session: Not on file    Stress: Not on file   Relationships    Social connections:     Talks on phone: Not on file     Gets together: Not on file     Attends Latter day service: Not on file     Active member of club or organization: Not on file     Attends meetings of clubs or organizations: Not on file     Relationship status: Not on file    Intimate partner violence:     Fear of current or ex partner: Not on file     Emotionally abused: Not on file     Physically abused: Not on file     Forced sexual activity: Not on file   Other Topics Concern    Not on file   Social History Narrative    Bereavement    Daily caffeine consumption, 6-8 servings per day     as per all scripts    Lives in [place]      Family History   Problem Relation Age of Onset    Colon cancer Mother     Alzheimer's disease Father     Stroke Father     Colon cancer Brother     Bipolar disorder Brother     Breast cancer Maternal Aunt     Colon cancer Maternal Aunt     Heart disease Other     Diabetes Other     Hypertension Other     Seizures Son     Depression Paternal Grandfather     No Known Problems Sister     No Known Problems Brother     Thyroid disease Neg Hx        ==  MD Marilou Butcher 73 Internal Medicine PGY-1    [facility] [address]  [address] , [id number] [place] [address] [address], [address] [facility]  Office: [phone]  Fax: [phone]

## 2020-02-14 ENCOUNTER — TELEPHONE (OUTPATIENT)
Dept: INTERNAL MEDICINE CLINIC | Facility: CLINIC | Age: 55
End: 2020-02-14

## 2020-02-14 LAB — T4 FREE SERPL-MCNC: 0.8 NG/DL (ref 0.76–1.46)

## 2020-02-14 NOTE — TELEPHONE ENCOUNTER
Patient calling because she forgot to mention to her  PCP that she's been experiencing cramping on both hands  Patient states that she cannot move her fingers when this happens

## 2020-02-17 ENCOUNTER — TELEPHONE (OUTPATIENT)
Dept: SLEEP CENTER | Facility: CLINIC | Age: 55
End: 2020-02-17

## 2020-02-18 ENCOUNTER — HOSPITAL ENCOUNTER (OUTPATIENT)
Dept: SLEEP CENTER | Facility: CLINIC | Age: 55
Discharge: HOME | End: 2020-02-18
Payer: COMMERCIAL

## 2020-02-18 ENCOUNTER — TELEPHONE (OUTPATIENT)
Dept: SLEEP CENTER | Facility: CLINIC | Age: 55
End: 2020-02-18

## 2020-02-18 DIAGNOSIS — F51.5 NIGHTMARES: ICD-10-CM

## 2020-02-18 PROCEDURE — 95810 POLYSOM 6/> YRS 4/> PARAM: CPT

## 2020-02-18 NOTE — TELEPHONE ENCOUNTER
I reviewed the patient liability acknowledgement form with the patient on the telephone  Patients who cancel their morning sleep study after 3:00 pm the day prior to the study, or cancel their evening sleep study after 12:00 pm on the day of the study, or fail to arrive for their scheduled appointment will be charged a $100 No Show Fee  Sleep studies scheduled on Sundays must be cancelled by 12:00 noon on the preceding Friday  The patient acknowledges verbally that they are financially responsible for a $100 No Show charge if they do not cancel their sleep study by the cancellation time requirement listed above  This charge will not be covered by their insurance company  This charge must be paid prior to the test being re-scheduled 
negative

## 2020-02-20 ENCOUNTER — TELEPHONE (OUTPATIENT)
Dept: INTERNAL MEDICINE CLINIC | Facility: CLINIC | Age: 55
End: 2020-02-20

## 2020-02-20 ENCOUNTER — OFFICE VISIT (OUTPATIENT)
Dept: INTERNAL MEDICINE CLINIC | Facility: CLINIC | Age: 55
End: 2020-02-20

## 2020-02-20 ENCOUNTER — TELEPHONE (OUTPATIENT)
Dept: NEUROLOGY | Facility: CLINIC | Age: 55
End: 2020-02-20

## 2020-02-20 ENCOUNTER — TELEPHONE (OUTPATIENT)
Dept: SLEEP CENTER | Facility: CLINIC | Age: 55
End: 2020-02-20

## 2020-02-20 VITALS
WEIGHT: 206.35 LBS | SYSTOLIC BLOOD PRESSURE: 138 MMHG | OXYGEN SATURATION: 98 % | HEIGHT: 58 IN | HEART RATE: 77 BPM | TEMPERATURE: 98.3 F | DIASTOLIC BLOOD PRESSURE: 90 MMHG | BODY MASS INDEX: 43.32 KG/M2

## 2020-02-20 DIAGNOSIS — M54.16 LUMBAR RADICULOPATHY: ICD-10-CM

## 2020-02-20 DIAGNOSIS — G47.33 OSA (OBSTRUCTIVE SLEEP APNEA): Primary | ICD-10-CM

## 2020-02-20 DIAGNOSIS — G89.4 CHRONIC PAIN DISORDER: ICD-10-CM

## 2020-02-20 DIAGNOSIS — R56.9 SEIZURE-LIKE ACTIVITY (HCC): ICD-10-CM

## 2020-02-20 DIAGNOSIS — M17.0 PRIMARY LOCALIZED OSTEOARTHRITIS OF BOTH KNEES: ICD-10-CM

## 2020-02-20 PROCEDURE — 3080F DIAST BP >= 90 MM HG: CPT | Performed by: HOSPITALIST

## 2020-02-20 PROCEDURE — 3075F SYST BP GE 130 - 139MM HG: CPT | Performed by: HOSPITALIST

## 2020-02-20 PROCEDURE — 99213 OFFICE O/P EST LOW 20 MIN: CPT | Performed by: HOSPITALIST

## 2020-02-20 PROCEDURE — 3008F BODY MASS INDEX DOCD: CPT | Performed by: HOSPITALIST

## 2020-02-20 PROCEDURE — 1036F TOBACCO NON-USER: CPT | Performed by: HOSPITALIST

## 2020-02-20 RX ORDER — MORPHINE SULFATE 15 MG/1
15 TABLET, FILM COATED, EXTENDED RELEASE ORAL 2 TIMES DAILY
Qty: 60 TABLET | Refills: 0 | Status: SHIPPED | OUTPATIENT
Start: 2020-02-20 | End: 2020-02-25 | Stop reason: SDUPTHER

## 2020-02-20 NOTE — PATIENT INSTRUCTIONS
Chronic Pain   AMBULATORY CARE:   Chronic pain  is pain that does not get better for 3 months or longer  Chronic pain may hurt all the time, or come and go  Call 911 or have someone call 911 for any of the following:   · You are breathing slower than normal, or you have trouble breathing  · You cannot be awakened  · You have a seizure  Seek care immediately if:   · Your heart is beating slower than normal     · Your heart feels like it is jumping or fluttering  · You cannot think clearly  Contact your healthcare provider if:   · You have side effects from prescription pain medicine, such as itching, nausea, or vomiting  · You have trouble sleeping  · Your pain gets worse, even after you take medicine  · You don't think the medicine is working  · You have questions or concerns about your condition or care  Treatment for chronic pain  may need any of the following:  · Acetaminophen  decreases pain  It is available without a doctor's order  Ask how much to take and how often to take it  Follow directions  Read the labels of all other medicines you are using to see if they also contain acetaminophen, or ask your doctor or pharmacist  Acetaminophen can cause liver damage if not taken correctly  Do not use more than 4 grams (4,000 milligrams) total of acetaminophen in one day  · NSAIDs , such as ibuprofen, help decrease swelling, pain, and fever  This medicine is available with or without a doctor's order  NSAIDs can cause stomach bleeding or kidney problems in certain people  If you take blood thinner medicine, always ask your healthcare provider if NSAIDs are safe for you  Always read the medicine label and follow directions  · Prescription pain medicine  called narcotics or opioids may be given  Ask your healthcare provider how to take this medicine safely  · Anesthetics  can be rubbed on your skin or injected into a nerve or muscle to numb an area      · Other medicines  may reduce pain, anxiety, muscle tension, or swelling  Manage your chronic pain:   · Apply heat  on the area in pain for 20 to 30 minutes every 2 hours for as many days as directed  Heat helps decrease pain and muscle spasms  · Apply ice  on the part of your body that hurts for 15 to 20 minutes every hour or as directed  Use an ice pack, or put crushed ice in a plastic bag  Cover it with a towel  Ice decreases pain and swelling, and helps prevent tissue damage  · Go to physical therapy as directed  A physical therapist teaches you exercises to help improve movement and strength, and to decrease pain  · Exercise for 30 minutes, 3 times a week  Regular physical activity can help decrease pain and improve your quality of life  Ask your healthcare provider about the best exercise plan for your type of pain  · Get enough sleep  Create a relaxing bedtime routine  Go to sleep and wake up at the same time every day  Avoid caffeine in the afternoon  · Talk with a counselor or therapist   A type of counseling called cognitive behavioral therapy (CBT) can help your chronic pain by changing the way you think about it  CBT can also improve your mood, sleep, and ability to move  What you must know if you take narcotic pain medicine:   · You may need to take a bowel movement softener  The most common side effect of prescription pain medicine is constipation  Bowel movement softeners are available over the counter  · Do not mix prescription pain medicines  This can cause an overdose of medicine, which can become life-threatening  Read labels  Make sure you know the ingredients in all of your medicines  · Do not drink alcohol  when you take prescription pain medicine  It is not safe to mix narcotics or opioids with alcohol or illegal drugs  · Prescription pain medicine may impair your ability to drive or work safely  They may also cause dizziness and increase your risk for falling       · Store prescription pain medicine in a safe location at home  Keep your medicine away from children and other people  Never share your medicine with anyone  Follow up with your healthcare provider as directed: You may be referred to a pain specialist  Write down your questions so you remember to ask them during your visits  © 2017 [employer] Information is for End User's use only and may not be sold, redistributed or otherwise used for commercial purposes  All illustrations and images included in CareNotes® are the copyrighted property of [employer]  or [employer]  The above information is an  only  It is not intended as medical advice for individual conditions or treatments  Talk to your doctor, nurse or pharmacist before following any medical regimen to see if it is safe and effective for you

## 2020-02-20 NOTE — PROGRESS NOTES
INTERNAL MEDICINE FOLLOW-UP OFFICE VISIT  [facility]  [address] [place] [address]    NAME: Hanna Manuel  AGE: 62 y o  SEX: female    DATE OF ENCOUNTER: 2/20/2020    Assessment and Plan     Diagnoses and all orders for this visit:    MIMI (obstructive sleep apnea)  She had a sleep study performed recently  EEG results were abnormal and suggestive of seizure  She notes staring spells at times and forgets moments of time  Will perform sleep deprived eeg and refer to neurology     Chronic pain disorder    Primary localized osteoarthritis of both knees        Orders Placed This Encounter   Procedures    Ambulatory referral to Neurology    EEG Sleep deprived       - Counseling Documentation: patient was counseled regarding: diagnostic results, instructions for management, risk factor reductions, prognosis, patient and family education, impressions, risks and benefits of treatment options and importance of compliance with treatment  - Counseling Time: counseling time more than 50% of visit: 30 minutes  - Barriers to treatment: no  - Medication Side Effects: Adverse side effects of medications were reviewed with the patient/guardian today  Chief Complaint     Chief Complaint   Patient presents with    Hand Pain     cramping        History of Present Illness     Back Pain   This is a chronic problem  The current episode started more than 1 year ago  The problem occurs intermittently  The problem has been waxing and waning since onset  The pain is present in the lumbar spine  The quality of the pain is described as aching  The pain does not radiate  The pain is at a severity of 6/10  The pain is moderate  The pain is worse during the night  The symptoms are aggravated by position, standing, stress and twisting  Stiffness is present in the morning   Pertinent negatives include no abdominal pain, bladder incontinence, bowel incontinence, chest pain, dysuria, fever, headaches, leg pain, numbness, paresis, paresthesias, tingling, weakness or weight loss  She has tried NSAIDs, analgesics and home exercises for the symptoms  The treatment provided mild relief  The following portions of the patient's history were reviewed and updated as appropriate: allergies, current medications, past family history, past medical history, past social history, past surgical history and problem list     Review of Systems     Review of Systems   Constitutional: Negative for activity change, appetite change, chills, diaphoresis, fever, unexpected weight change and weight loss  HENT: Negative for congestion, facial swelling and rhinorrhea  Eyes: Negative for photophobia and visual disturbance  Respiratory: Negative for cough, shortness of breath and wheezing  Cardiovascular: Negative for chest pain and palpitations  Gastrointestinal: Negative for abdominal pain, blood in stool, bowel incontinence, constipation, diarrhea, nausea and vomiting  Genitourinary: Negative for bladder incontinence, decreased urine volume, difficulty urinating, dysuria, flank pain, frequency, hematuria and urgency  Musculoskeletal: Positive for back pain  Negative for arthralgias, joint swelling and myalgias  Neurological: Negative for dizziness, tingling, syncope, facial asymmetry, weakness, light-headedness, numbness, headaches and paresthesias  Psychiatric/Behavioral: Negative for confusion and decreased concentration  The patient is not nervous/anxious          Active Problem List     Patient Active Problem List   Diagnosis    Chronic bilateral low back pain with bilateral sciatica    Right knee pain    Chronic pain disorder    Lumbar radiculopathy    Lumbar spondylosis    Fibromyalgia    Lumbar disc disease with radiculopathy    Spinal stenosis of lumbar region with neurogenic claudication    Anxiety    Pain in joint, shoulder region    Bilateral hip pain    [facility] )  Cognitive disorder    Esophageal reflux    Fatigue    Female pelvic pain    Generalized anxiety disorder    Hypertension    Hypokalemia    Insomnia    Major depressive disorder, recurrent episode, moderate degree (HCC)    Migraine    Opioid dependence (HCC)    Osteoarthritis of both hips    Osteoarthritis of knee    Overactive bladder    Pain, joint, hip    Panic disorder without agoraphobia    Post traumatic stress disorder    Primary localized osteoarthritis of both knees    Recent unexplained weight loss    Sacroiliitis (HCC)    Tremor    Urinary incontinence    Vitamin D deficiency    Post laminectomy syndrome    Encounter for long-term use of opiate analgesic    Family history of colorectal cancer    Morbid obesity with BMI of 40 0-44 9, adult ([facility] )    Complaint related to dreams    MIMI (obstructive sleep apnea)       Objective     /90 (BP Location: Right arm, Patient Position: Sitting, Cuff Size: Adult)   Pulse 77   Temp 98 3 °F (36 8 °C) (Oral)   Ht 4' 10" (1 473 m)   Wt 93 6 kg (206 lb 5 6 oz)   SpO2 98%   BMI 43 13 kg/m²     Physical Exam   Constitutional: She is oriented to person, place, and time  She appears well-developed and well-nourished  No distress  HENT:   Head: Normocephalic and atraumatic  Nose: Nose normal    Mouth/Throat: Oropharynx is clear and moist  No oropharyngeal exudate  Eyes: Pupils are equal, round, and reactive to light  EOM are normal  Right eye exhibits no discharge  Left eye exhibits no discharge  No scleral icterus  Neck: Normal range of motion  Neck supple  No JVD present  Cardiovascular: Normal rate, regular rhythm, normal heart sounds and intact distal pulses  Exam reveals no gallop and no friction rub  No murmur heard  Pulmonary/Chest: Effort normal and breath sounds normal  No respiratory distress  She has no wheezes  She has no rales  She exhibits no tenderness  Abdominal: Soft   Bowel sounds are normal  She exhibits no distension and no mass  There is no tenderness  There is no rebound and no guarding  Musculoskeletal: Normal range of motion  She exhibits no edema, tenderness or deformity  Lymphadenopathy:     She has no cervical adenopathy  Neurological: She is alert and oriented to person, place, and time  She has normal reflexes  Skin: Skin is warm and dry  No rash noted  She is not diaphoretic  No erythema  No pallor  Psychiatric: She has a normal mood and affect   Her behavior is normal        Pertinent Laboratory/Diagnostic Studies:  CBC:   Lab Results   Component Value Date/Time    WBC 4 50 10/26/2015 11:23 AM    RBC 4 80 10/26/2015 11:23 AM    HGB 14 9 10/26/2015 11:23 AM    HCT 39 7 02/16/2016 12:16 PM    HCT 41 9 10/26/2015 11:23 AM    MCV 87 10/26/2015 11:23 AM    MCH 31 0 10/26/2015 11:23 AM    MCHC 35 6 10/26/2015 11:23 AM    RDW 12 9 10/26/2015 11:23 AM    MPV 9 5 10/26/2015 11:23 AM     10/26/2015 11:23 AM    NRBC 0 10/26/2015 11:23 AM    NEUTOPHILPCT 54 10/26/2015 11:23 AM    LYMPHOPCT 36 10/26/2015 11:23 AM    MONOPCT 8 10/26/2015 11:23 AM    EOSPCT 1 10/26/2015 11:23 AM    BASOPCT 1 10/26/2015 11:23 AM    NEUTROABS 2 43 10/26/2015 11:23 AM    LYMPHSABS 1 62 10/26/2015 11:23 AM    MONOSABS 0 36 10/26/2015 11:23 AM    EOSABS 0 05 10/26/2015 11:23 AM     Chemistry Profile:   Lab Results   Component Value Date/Time     07/27/2015 12:06 PM    K 3 9 02/13/2020 10:21 AM    K 3 6 07/27/2015 12:06 PM     02/13/2020 10:21 AM    CL 99 (L) 07/27/2015 12:06 PM    CO2 33 (H) 02/13/2020 10:21 AM    CO2 31 07/27/2015 12:06 PM    ANIONGAP 7 07/27/2015 12:06 PM    BUN 8 02/13/2020 10:21 AM    BUN 6 07/27/2015 12:06 PM    CREATININE 0 70 02/13/2020 10:21 AM    CREATININE 0 75 07/27/2015 12:06 PM    GLUC 94 07/18/2017 01:47 PM    GLUF 91 02/13/2020 10:21 AM    GLUCOSE 96 07/27/2015 12:06 PM    CALCIUM 8 5 02/13/2020 10:21 AM    CALCIUM 9 1 07/27/2015 12:06 PM    AST 13 07/18/2017 01:47 PM    AST 9 02/22/2014 06:15 AM    ALT 19 07/18/2017 01:47 PM    ALT 18 02/22/2014 06:15 AM    ALKPHOS 86 07/18/2017 01:47 PM    ALKPHOS 96 02/22/2014 06:15 AM    PROT 6 9 02/22/2014 06:15 AM    BILITOT 0 51 02/22/2014 06:15 AM    EGFR 112 02/13/2020 10:21 AM     CBC: No results for input(s): WBC, RBC, HGB, HCT, MCV, MCH, MCHC, RDW, MPV, PLT, NRBC, NEUTOPHILPCT, LYMPHOPCT, MONOPCT, EOSPCT, BASOPCT, NEUTROABS, LYMPHSABS, MONOSABS, EOSABS, MONOSABS in the last 8784 hours      Current Medications     Current Outpatient Medications:     acetaminophen (TYLENOL) 500 mg tablet, Take 500 mg by mouth every 6 (six) hours as needed for mild pain, Disp: , Rfl:     amLODIPine (NORVASC) 10 mg tablet, Take by mouth, Disp: , Rfl:     amLODIPine (NORVASC) 5 mg tablet, Take 1 tablet (5 mg total) by mouth daily, Disp: 60 tablet, Rfl: 0    aspirin 81 MG tablet, Take 1 tablet (81 mg total) by mouth daily, Disp: 30 tablet, Rfl: 3    busPIRone (BUSPAR) 5 mg tablet, TAKE 1 TABLET (5 MG TOTAL) BY MOUTH 3 (THREE) TIMES A DAY, Disp: 90 tablet, Rfl: 0    cetirizine (ZyrTEC) 10 mg tablet, , Disp: , Rfl: 1    Cholecalciferol (VITAMIN D3) 1000 units CAPS, Take 1 capsule (1,000 Units total) by mouth daily, Disp: 90 capsule, Rfl: 0    clonazePAM (KlonoPIN) 1 mg tablet, Take by mouth, Disp: , Rfl:     clotrimazole (LOTRIMIN) 1 % cream, Apply topically 2 (two) times a day (Patient not taking: Reported on 2/6/2020), Disp: 30 g, Rfl: 0    DULoxetine (CYMBALTA) 30 mg delayed release capsule, Take 30 mg by mouth daily, Disp: , Rfl:     DULoxetine (CYMBALTA) 60 mg delayed release capsule, Take 1 capsule (60 mg total) by mouth daily, Disp: 90 capsule, Rfl: 0    hydrOXYzine HCL (ATARAX) 25 mg tablet, Take 25 mg by mouth every 8 (eight) hours as needed, Disp: , Rfl:     ibuprofen (MOTRIN) 600 mg tablet, Take 600 mg by mouth every 6 (six) hours as needed for mild pain, Disp: , Rfl:     lactulose (CEPHULAC) 10 g packet, Take 10 g by mouth 3 (three) times a day, Disp: , Rfl:     lamoTRIgine (LaMICtal) 100 mg tablet, Take by mouth, Disp: , Rfl:     LORazepam (ATIVAN) 0 5 mg tablet, Take 0 5 mg by mouth every 8 (eight) hours as needed, Disp: , Rfl:     lubiprostone (AMITIZA) 24 mcg capsule, Take 1 capsule (24 mcg total) by mouth 2 (two) times a day with meals, Disp: 30 capsule, Rfl: 3    meloxicam (MOBIC) 15 mg tablet, Take 15 mg by mouth daily , Disp: , Rfl:     morphine (MS CONTIN) 15 mg 12 hr tablet, Take 1 tablet (15 mg total) by mouth 2 (two) times a dayMax Daily Amount: 30 mg, Disp: 60 tablet, Rfl: 0    omeprazole (PriLOSEC) 20 mg delayed release capsule, Take 1 capsule (20 mg total) by mouth daily, Disp: 60 capsule, Rfl: 0    oxybutynin (DITROPAN) 5 mg tablet, Take by mouth, Disp: , Rfl:     oxybutynin (DITROPAN-XL) 10 MG 24 hr tablet, Take 1 tablet (10 mg total) by mouth daily at bedtime, Disp: 90 tablet, Rfl: 0    polyethylene glycol (MIRALAX) 17 g, Take by mouth, Disp: , Rfl:     potassium chloride (KLOR-CON 10) 10 mEq tablet, Take by mouth, Disp: , Rfl:     prazosin (MINIPRESS) 1 mg capsule, Take 1 capsule (1 mg total) by mouth daily at bedtime (Patient taking differently: Take 2 mg by mouth daily at bedtime ), Disp: 90 capsule, Rfl: 0    pregabalin (LYRICA) 100 mg capsule, Take 1 capsule (100 mg total) by mouth 3 (three) times a day, Disp: 90 capsule, Rfl: 2    primidone (MYSOLINE) 250 mg tablet, Take 1 tablet (250 mg total) by mouth daily at bedtime, Disp: 90 tablet, Rfl: 0    primidone (MYSOLINE) 250 mg tablet, Take by mouth, Disp: , Rfl:     propranolol (INDERAL) 20 mg tablet, Take 1 tablet (20 mg total) by mouth 2 (two) times a day, Disp: 120 tablet, Rfl: 0    QUEtiapine (SEROquel XR) 300 mg 24 hr tablet, Take 1 tablet (300 mg total) by mouth daily at bedtime, Disp: 60 tablet, Rfl: 1    risperiDONE (RisperDAL) 2 mg tablet, Take by mouth, Disp: , Rfl:     senna-docusate sodium (SENOKOT-S) 8 6-50 mg per tablet, Take 1 tablet by mouth daily, Disp: , Rfl:     SUMAtriptan (IMITREX) 50 mg tablet, Take 1 tablet (50 mg total) by mouth once as needed for migraine for up to 1 dose, Disp: 10 tablet, Rfl: 0    traMADol (ULTRAM) 50 mg tablet, Take by mouth, Disp: , Rfl:     traZODone (DESYREL) 100 mg tablet, Take 50mg at night for a week, then increase to 100mg at night for a week, then increase to 150mg at night, Disp: , Rfl:     traZODone (DESYREL) 100 mg tablet, Take by mouth, Disp: , Rfl:     Valbenazine Tosylate 80 MG CAPS, Take 80 mg by mouth daily, Disp: , Rfl:     venlafaxine 150 MG TB24, Take by mouth, Disp: , Rfl:     venlafaxine 75 mg 24 hr tablet, Take by mouth, Disp: , Rfl:     Health Maintenance     Health Maintenance   Topic Date Due    Medicare Annual Wellness Visit (AWV)  1965    PT PLAN OF CARE  1965    CRC Screening: Colonoscopy  1965    HIV Screening  12/26/1978    BMI: Followup Plan  12/26/1981    Cervical Cancer Screening  12/26/1984    MAMMOGRAM  10/10/2014    Influenza Vaccine  07/01/2019    BMI: Adult  02/13/2021    DTaP,Tdap,and Td Vaccines (2 - Td) 07/25/2024    Pneumococcal Vaccine: 65+ Years (1 of 2 - PCV13) 12/26/2028    Hepatitis C Screening  Completed    Pneumococcal Vaccine: Pediatrics (0 to 5 Years) and At-Risk Patients (6 to 59 Years)  Aged Out    HIB Vaccine  Aged Out    Hepatitis B Vaccine  Aged Out    IPV Vaccine  Aged Out    Hepatitis A Vaccine  Aged Out    Meningococcal ACWY Vaccine  Aged Out    HPV Vaccine  Aged [employer] History   Administered Date(s) Administered    Influenza TIV (IM) 10/02/2013, 11/20/2014, 10/26/2015    Tdap 07/25/2014       Eileen QUINONEZ  Internal Medicine PGY-3  2/20/2020 2:19 PM

## 2020-02-20 NOTE — TELEPHONE ENCOUNTER
Folder Color- Purple    Name of Form- [facility] Physician Communication Form    Form to be filled out by- Dr Alden Collado (He)    Form to be given to patient at the end of the appointment

## 2020-02-20 NOTE — TELEPHONE ENCOUNTER
Name of medication, dose, quantity and frequency  Requested Prescriptions     Pending Prescriptions Disp Refills    morphine (MS CONTIN) 15 mg 12 hr tablet 56 tablet 0     Sig: Take 1 tablet (15 mg total) by mouth 2 (two) times a dayMax Daily Amount: 30 mg         Number of refills left: 0     Amount of medication left: 2    Pharmacy verified and updated: [facility]     Additional information: patient is in a lot of pain, needs the refill asap

## 2020-02-20 NOTE — TELEPHONE ENCOUNTER
Discussed study results- patient will call back tomorrow to schedule consult appointment- office # provided

## 2020-02-20 NOTE — TELEPHONE ENCOUNTER
Best contact number for patient:[phone]    Emergency Contact name and number:  Annemarie Kirk [phone]  Referring provider and telephone number:  Dr Tameka Galicia [phone]  Primary Care Provider Name and if affiliated with [facility] Yes    Reason for Appointment/Dx:seizures    Neurology Location patient would like to be seen:[facility]    Order received? Yes                                                 Records Received? Yes    Have you ever seen another Neurologist?       No    Insurance Information    Insurance Name:[facility]     ID/Policy #:[id number]    Secondary Insurance:    ID/Policy#: Workman's Comp/ Accident/ School  Information      Workman's Comp/Accident/School related?        No    If yes name of Insurance company:    Date of Injury:    Type of Injury:    [address] Name and Telephone Number:    Notes:                   Appointment date:   6/24/2020

## 2020-02-24 ENCOUNTER — TELEPHONE (OUTPATIENT)
Dept: OBGYN CLINIC | Facility: HOSPITAL | Age: 55
End: 2020-02-24

## 2020-02-24 ENCOUNTER — OFFICE VISIT (OUTPATIENT)
Dept: NEUROSURGERY | Facility: CLINIC | Age: 55
End: 2020-02-24
Payer: COMMERCIAL

## 2020-02-24 ENCOUNTER — TELEPHONE (OUTPATIENT)
Dept: INTERNAL MEDICINE CLINIC | Facility: CLINIC | Age: 55
End: 2020-02-24

## 2020-02-24 VITALS
TEMPERATURE: 97.8 F | DIASTOLIC BLOOD PRESSURE: 80 MMHG | HEIGHT: 58 IN | SYSTOLIC BLOOD PRESSURE: 130 MMHG | WEIGHT: 206 LBS | RESPIRATION RATE: 16 BRPM | HEART RATE: 77 BPM | BODY MASS INDEX: 43.24 KG/M2

## 2020-02-24 DIAGNOSIS — M54.41 CHRONIC BILATERAL LOW BACK PAIN WITH BILATERAL SCIATICA: ICD-10-CM

## 2020-02-24 DIAGNOSIS — G89.29 CHRONIC BILATERAL LOW BACK PAIN WITH BILATERAL SCIATICA: ICD-10-CM

## 2020-02-24 DIAGNOSIS — M54.42 CHRONIC BILATERAL LOW BACK PAIN WITH BILATERAL SCIATICA: ICD-10-CM

## 2020-02-24 PROCEDURE — 3008F BODY MASS INDEX DOCD: CPT | Performed by: NEUROLOGICAL SURGERY

## 2020-02-24 PROCEDURE — 3075F SYST BP GE 130 - 139MM HG: CPT | Performed by: NEUROLOGICAL SURGERY

## 2020-02-24 PROCEDURE — 99215 OFFICE O/P EST HI 40 MIN: CPT | Performed by: NEUROLOGICAL SURGERY

## 2020-02-24 PROCEDURE — 1036F TOBACCO NON-USER: CPT | Performed by: NEUROLOGICAL SURGERY

## 2020-02-24 PROCEDURE — 3079F DIAST BP 80-89 MM HG: CPT | Performed by: NEUROLOGICAL SURGERY

## 2020-02-24 NOTE — TELEPHONE ENCOUNTER
Patient is aware  She is going to follow up with her insurance company to see if she can speed it up  She is aware she can pay out of pocket for the medication but states she does not have the money for it

## 2020-02-24 NOTE — PROGRESS NOTES
Assessment/Plan:    No problem-specific Assessment & Plan notes found for this encounter  History, physical examination and diagnostic tests were reviewed and questions answered  Diagnosis, care plan and treatment options were discussed  The patient understand instructions and will follow up as directed  Patient with prior thoracic and lumbar multilevel fusion  She had a reported medtronic percutaneous trial last year with some relief of pain  She has low back and bilateral leg pain  She was seen by Ben Chang and was sent with a MRI thoracic  She is unclear and unable to obtain the records  I consulted with the [employer] rep and indeed she had a percutaneous trial we have attached the images at T8  I will order a CT T&L to assess the clinical anatomy  If her document support a successful trial we will discuss candidacy for SCS implantation  IMAGING REVIEWED: MRI thoracic shows multilevel hardware artifact but no obvious stenosis  XR shows multilevel pedicle screws and rods  Diagnoses and all orders for this visit:    Chronic bilateral low back pain with bilateral sciatica  -     Ambulatory referral to Neurosurgery  -     CT spine thoracic & lumbar wo contrast; Future          Subjective:      Patient ID: Samantha Olivier is a 62 y o  female  Patient is a 62year old female with symptoms of low back and bilateral leg pain  Pain is severe and constant in quality  Pain distribution is low back upper back and bilateral leg, mostly upper leg  Pain is worse with activity  Pain is improved by noting really  The pain has been present for a few years she had surgery in reportedly 2018 for scoliosis  Pain has associated distress and reduced ambulation  She reports having a medtronic trial in [place] and wishes to have the therapy  She saw Ben Chang unfortunately she had no information available to assess for candidacy of stimulation therapy         Review of Systems   Constitutional: Positive for fatigue  Chills: when pain increases    HENT: Negative  Eyes: Negative  Respiratory: Negative  Cardiovascular: Negative  Gastrointestinal: Positive for constipation  Endocrine: Negative  Genitourinary: Positive for frequency  Negative for urgency  Musculoskeletal: Positive for back pain (up/down spine, across lower back radiates into bilateral hips, right buttock, and down bilateral legs/knees) and gait problem  Uses walker for assistance    Skin: Negative  Allergic/Immunologic: Negative  Neurological: Positive for weakness (bilateral legs ), numbness (bilateral legs numbness and tingling ) and headaches (migraines )  Negative for dizziness, seizures and syncope  Hematological: Does not bruise/bleed easily (patient on ASA 81)  Psychiatric/Behavioral: Positive for sleep disturbance (due to pain )  Negative for confusion  I have personally reviewed all aspects of the review of systems as documented    Objective:      /80 (BP Location: Right arm)   Pulse 77   Temp 97 8 °F (36 6 °C) (Tympanic)   Resp 16   Ht 4' 10" (1 473 m)   Wt 93 4 kg (206 lb)   BMI 43 05 kg/m²          Physical Exam   Constitutional: She is oriented to person, place, and time  She appears well-developed and well-nourished  No distress  HENT:   Head: Normocephalic and atraumatic  Nose: Nose normal    Eyes: Pupils are equal, round, and reactive to light  Conjunctivae and EOM are normal  Right eye exhibits no discharge  Left eye exhibits no discharge  No scleral icterus  Neck: Normal range of motion  No thyromegaly present  Cardiovascular: Normal rate  Pulmonary/Chest: Effort normal and breath sounds normal  No respiratory distress  Musculoskeletal: Normal range of motion  She exhibits no edema or deformity  Neurological: She is alert and oriented to person, place, and time  No cranial nerve deficit or sensory deficit     No obvious focal deficit but diffusely deconditioned  Uses a walker for ambulation due to pain   Skin: Skin is warm and dry  No rash noted  She is not diaphoretic  No erythema  No pallor

## 2020-02-24 NOTE — TELEPHONE ENCOUNTER
Spoke to patient  She stated has tried Voltaren in the past and it did not work for her but she stated she would be willing to give it a try again  Please send to the "[employer]" on file   Thanks

## 2020-02-24 NOTE — TELEPHONE ENCOUNTER
Name of medication, dose, quantity and frequency    morphine (MS CONTIN) 15 mg 12 hr tablet  2 times daily    Number of refills left: 0    Amount of medication left: 0    Pharmacy verified and updated- Yes    Additional information: I confirmed with Chaim at [employer]  Her insurance plan only allows for one pill a day  Plan Limits were exceeded is the response the insurance is giving them  She believes the Doctor has to actually call to confirm dosage with the insurance company  Patient is out of the medication and is anxious about getting this refilled  Please call her to let her know status of what's going on

## 2020-02-24 NOTE — TELEPHONE ENCOUNTER
Based on review of the patient's chart, would be best to avoid NSAIDs if at all possible  Other than this, given her current prescriptions being managed ready by pain management we would not have much to offer in regards of oral medication  We can try a topical NSAID,  Please let us know if she would like us to prescribe this this might not be approved medially and this might not be effective but it might be worth a try the name of the medication is Voltaren in case she has already tried it

## 2020-02-24 NOTE — LETTER
February 24, 2020     [place] [address]    Patient: Alex Hanley   YOB: 1965   Date of Visit: 2/24/2020       Dear Dr Cb Sierra:    Thank you for referring Daneil Valenzuela to me for evaluation  Below are my notes for this consultation  If you have questions, please do not hesitate to call me  I look forward to following your patient along with you  Sincerely,        Merle Morin MD        CC: DO Merle Villalobos MD  2/24/2020 10:11 AM  Sign at close encounter  Assessment/Plan:    No problem-specific Assessment & Plan notes found for this encounter  History, physical examination and diagnostic tests were reviewed and questions answered  Diagnosis, care plan and treatment options were discussed  The patient understand instructions and will follow up as directed  Patient with prior thoracic and lumbar multilevel fusion  She had a reported medtronic percutaneous trial last year with some relief of pain  She has low back and bilateral leg pain  She was seen by Jessica Mera and was sent with a MRI thoracic  She is unclear and unable to obtain the records  I consulted with the [employer] rep and indeed she had a percutaneous trial we have attached the images at T8  I will order a CT T&L to assess the clinical anatomy  If her document support a successful trial we will discuss candidacy for SCS implantation  IMAGING REVIEWED: MRI thoracic shows multilevel hardware artifact but no obvious stenosis  XR shows multilevel pedicle screws and rods  Diagnoses and all orders for this visit:    Chronic bilateral low back pain with bilateral sciatica  -     Ambulatory referral to Neurosurgery  -     CT spine thoracic & lumbar wo contrast; Future          Subjective:      Patient ID: Samantha Howell is a 62 y o  female  Patient is a 62year old female with symptoms of low back and bilateral leg pain  Pain is severe and constant in quality  Pain distribution is low back upper back and bilateral leg, mostly upper leg  Pain is worse with activity  Pain is improved by noting really  The pain has been present for a few years she had surgery in reportedly 2018 for scoliosis  Pain has associated distress and reduced ambulation  She reports having a medtronic trial in [place] and wishes to have the therapy  She saw Shannan Vargas unfortunately she had no information available to assess for candidacy of stimulation therapy  Review of Systems   Constitutional: Positive for fatigue  Chills: when pain increases    HENT: Negative  Eyes: Negative  Respiratory: Negative  Cardiovascular: Negative  Gastrointestinal: Positive for constipation  Endocrine: Negative  Genitourinary: Positive for frequency  Negative for urgency  Musculoskeletal: Positive for back pain (up/down spine, across lower back radiates into bilateral hips, right buttock, and down bilateral legs/knees) and gait problem  Uses walker for assistance    Skin: Negative  Allergic/Immunologic: Negative  Neurological: Positive for weakness (bilateral legs ), numbness (bilateral legs numbness and tingling ) and headaches (migraines )  Negative for dizziness, seizures and syncope  Hematological: Does not bruise/bleed easily (patient on ASA 81)  Psychiatric/Behavioral: Positive for sleep disturbance (due to pain )  Negative for confusion  I have personally reviewed all aspects of the review of systems as documented    Objective:      /80 (BP Location: Right arm)   Pulse 77   Temp 97 8 °F (36 6 °C) (Tympanic)   Resp 16   Ht 4' 10" (1 473 m)   Wt 93 4 kg (206 lb)   BMI 43 05 kg/m²           Physical Exam   Constitutional: She is oriented to person, place, and time  She appears well-developed and well-nourished  No distress  HENT:   Head: Normocephalic and atraumatic  Nose: Nose normal    Eyes: Pupils are equal, round, and reactive to light   Conjunctivae and EOM are normal  Right eye exhibits no discharge  Left eye exhibits no discharge  No scleral icterus  Neck: Normal range of motion  No thyromegaly present  Cardiovascular: Normal rate  Pulmonary/Chest: Effort normal and breath sounds normal  No respiratory distress  Musculoskeletal: Normal range of motion  She exhibits no edema or deformity  Neurological: She is alert and oriented to person, place, and time  No cranial nerve deficit or sensory deficit  No obvious focal deficit but diffusely deconditioned  Uses a walker for ambulation due to pain   Skin: Skin is warm and dry  No rash noted  She is not diaphoretic  No erythema  No pallor

## 2020-02-24 NOTE — TELEPHONE ENCOUNTER
Patient is calling wondering since the shots were not working and she is in extreme pain in the knees if this is going to be the case until she gets a knee replacement done

## 2020-02-25 DIAGNOSIS — M54.16 LUMBAR RADICULOPATHY: ICD-10-CM

## 2020-02-25 DIAGNOSIS — M17.10 OSTEOARTHRITIS OF KNEE, UNSPECIFIED LATERALITY, UNSPECIFIED OSTEOARTHRITIS TYPE: Primary | ICD-10-CM

## 2020-02-25 DIAGNOSIS — G89.4 CHRONIC PAIN DISORDER: ICD-10-CM

## 2020-02-25 RX ORDER — MORPHINE SULFATE 15 MG/1
15 TABLET, FILM COATED, EXTENDED RELEASE ORAL 2 TIMES DAILY
Qty: 60 TABLET | Refills: 0 | Status: SHIPPED | OUTPATIENT
Start: 2020-02-25 | End: 2020-04-08 | Stop reason: SDUPTHER

## 2020-02-25 NOTE — TELEPHONE ENCOUNTER
Name of medication, dose, quantity and frequency:    Requested Prescriptions     Pending Prescriptions Disp Refills    morphine (MS CONTIN) 15 mg 12 hr tablet 60 tablet 0     Sig: Take 1 tablet (15 mg total) by mouth 2 (two) times a dayMax Daily Amount: 30 mg       Number of refills left:    Amount of medication left:    Pharmacy verified and updated    Additional information:    SCRIPT WAS SENT IN ON 02/20/2020 BUT REQUIRED A PRIOR AUTH  THEREFORE PATIENT WAS GIVEN A TEMPORARY 7 DAY SUPPLY BY PHARMACY  SCRIPT NOW APPROVED BY INSURANCE SO PATIENT NEEDS A NEW SCRIPT FOR 21 DAYS SENT INTO PHARMACY

## 2020-02-25 NOTE — TELEPHONE ENCOUNTER
Called patient to let her know that prescription was sent and she should give them a call to see if its ready for , patient understood

## 2020-02-25 NOTE — TELEPHONE ENCOUNTER
Spoke with Dr Rodrigo Ibrahim and he advised he will just restart her and send in a new script for a 30 day supply instead of the additional 3 weeks  New rx sent   Patient made aware

## 2020-02-25 NOTE — TELEPHONE ENCOUNTER
Prior auth approved  APPROVAL IS GOOD UNTIL 12/31/2020  Placing prior auth along with approval in scanning bin  Patient will be made aware  Please review other encounter from 02/25/2020

## 2020-02-27 ENCOUNTER — TELEPHONE (OUTPATIENT)
Dept: INTERNAL MEDICINE CLINIC | Facility: CLINIC | Age: 55
End: 2020-02-27

## 2020-02-27 NOTE — TELEPHONE ENCOUNTER
Folder Color- Purple    Name of [address]- PT/OT/ST ID # [id number]    Form to be filled out by- Dr Cassandra Friedman covering provider     Form to be Faxed ([phone])    Patient made aware of 10 business day policy

## 2020-03-08 ENCOUNTER — HOSPITAL ENCOUNTER (INPATIENT)
Facility: HOSPITAL | Age: 55
LOS: 9 days | Discharge: HOME | DRG: 560 | End: 2020-03-19
Attending: EMERGENCY MEDICINE | Admitting: INTERNAL MEDICINE
Payer: COMMERCIAL

## 2020-03-08 ENCOUNTER — APPOINTMENT (EMERGENCY)
Dept: RADIOLOGY | Facility: HOSPITAL | Age: 55
DRG: 560 | End: 2020-03-08
Payer: COMMERCIAL

## 2020-03-08 DIAGNOSIS — G47.33 OSA (OBSTRUCTIVE SLEEP APNEA): Primary | ICD-10-CM

## 2020-03-08 DIAGNOSIS — F31.81 BIPOLAR II DISORDER (HCC): ICD-10-CM

## 2020-03-08 DIAGNOSIS — I10 HYPERTENSION, UNSPECIFIED TYPE: ICD-10-CM

## 2020-03-08 DIAGNOSIS — M54.16 LUMBAR RADICULOPATHY: ICD-10-CM

## 2020-03-08 DIAGNOSIS — F11.20 UNCOMPLICATED OPIOID DEPENDENCE (HCC): ICD-10-CM

## 2020-03-08 DIAGNOSIS — F33.1 MAJOR DEPRESSIVE DISORDER, RECURRENT EPISODE, MODERATE DEGREE (HCC): ICD-10-CM

## 2020-03-08 DIAGNOSIS — M54.59 INTRACTABLE LOW BACK PAIN: ICD-10-CM

## 2020-03-08 DIAGNOSIS — E55.9 VITAMIN D DEFICIENCY: ICD-10-CM

## 2020-03-08 DIAGNOSIS — F41.9 ANXIETY: ICD-10-CM

## 2020-03-08 DIAGNOSIS — M54.42 CHRONIC BILATERAL LOW BACK PAIN WITH BILATERAL SCIATICA: ICD-10-CM

## 2020-03-08 DIAGNOSIS — M25.559 ARTHRALGIA OF HIP, UNSPECIFIED LATERALITY: ICD-10-CM

## 2020-03-08 DIAGNOSIS — N39.41 URGE INCONTINENCE OF URINE: ICD-10-CM

## 2020-03-08 DIAGNOSIS — G89.29 CHRONIC BILATERAL LOW BACK PAIN WITH BILATERAL SCIATICA: ICD-10-CM

## 2020-03-08 DIAGNOSIS — G47.00 INSOMNIA: ICD-10-CM

## 2020-03-08 DIAGNOSIS — M54.41 CHRONIC BILATERAL LOW BACK PAIN WITH BILATERAL SCIATICA: ICD-10-CM

## 2020-03-08 LAB
ALBUMIN SERPL BCP-MCNC: 3.8 G/DL (ref 3.5–5)
ALP SERPL-CCNC: 131 U/L (ref 46–116)
ALT SERPL W P-5'-P-CCNC: 12 U/L (ref 12–78)
ANION GAP SERPL CALCULATED.3IONS-SCNC: 9 MMOL/L (ref 4–13)
AST SERPL W P-5'-P-CCNC: 14 U/L (ref 5–45)
BASOPHILS # BLD AUTO: 0.01 THOUSANDS/ΜL (ref 0–0.1)
BASOPHILS NFR BLD AUTO: 0 % (ref 0–1)
BILIRUB SERPL-MCNC: 1.49 MG/DL (ref 0.2–1)
BUN SERPL-MCNC: 10 MG/DL (ref 5–25)
CALCIUM SERPL-MCNC: 8.8 MG/DL (ref 8.3–10.1)
CHLORIDE SERPL-SCNC: 100 MMOL/L (ref 100–108)
CO2 SERPL-SCNC: 28 MMOL/L (ref 21–32)
CREAT SERPL-MCNC: 0.82 MG/DL (ref 0.6–1.3)
CRP SERPL QL: 48.2 MG/L
EOSINOPHIL # BLD AUTO: 0.02 THOUSAND/ΜL (ref 0–0.61)
EOSINOPHIL NFR BLD AUTO: 0 % (ref 0–6)
ERYTHROCYTE [DISTWIDTH] IN BLOOD BY AUTOMATED COUNT: 12.6 % (ref 11.6–15.1)
ERYTHROCYTE [SEDIMENTATION RATE] IN BLOOD: 16 MM/HOUR (ref 0–20)
GFR SERPL CREATININE-BSD FRML MDRD: 93 ML/MIN/1.73SQ M
GLUCOSE SERPL-MCNC: 121 MG/DL (ref 65–140)
HCT VFR BLD AUTO: 43.3 % (ref 34.8–46.1)
HGB BLD-MCNC: 14.6 G/DL (ref 11.5–15.4)
IMM GRANULOCYTES # BLD AUTO: 0.03 THOUSAND/UL (ref 0–0.2)
IMM GRANULOCYTES NFR BLD AUTO: 1 % (ref 0–2)
LYMPHOCYTES # BLD AUTO: 0.34 THOUSANDS/ΜL (ref 0.6–4.47)
LYMPHOCYTES NFR BLD AUTO: 6 % (ref 14–44)
MCH RBC QN AUTO: 29.4 PG (ref 26.8–34.3)
MCHC RBC AUTO-ENTMCNC: 33.7 G/DL (ref 31.4–37.4)
MCV RBC AUTO: 87 FL (ref 82–98)
MONOCYTES # BLD AUTO: 0.41 THOUSAND/ΜL (ref 0.17–1.22)
MONOCYTES NFR BLD AUTO: 7 % (ref 4–12)
NEUTROPHILS # BLD AUTO: 4.75 THOUSANDS/ΜL (ref 1.85–7.62)
NEUTS SEG NFR BLD AUTO: 86 % (ref 43–75)
NRBC BLD AUTO-RTO: 0 /100 WBCS
PLATELET # BLD AUTO: 243 THOUSANDS/UL (ref 149–390)
PMV BLD AUTO: 9.1 FL (ref 8.9–12.7)
POTASSIUM SERPL-SCNC: 3.1 MMOL/L (ref 3.5–5.3)
PROT SERPL-MCNC: 7.6 G/DL (ref 6.4–8.2)
RBC # BLD AUTO: 4.96 MILLION/UL (ref 3.81–5.12)
SODIUM SERPL-SCNC: 137 MMOL/L (ref 136–145)
WBC # BLD AUTO: 5.56 THOUSAND/UL (ref 4.31–10.16)

## 2020-03-08 PROCEDURE — 99285 EMERGENCY DEPT VISIT HI MDM: CPT | Performed by: EMERGENCY MEDICINE

## 2020-03-08 PROCEDURE — 36415 COLL VENOUS BLD VENIPUNCTURE: CPT | Performed by: EMERGENCY MEDICINE

## 2020-03-08 PROCEDURE — 72131 CT LUMBAR SPINE W/O DYE: CPT

## 2020-03-08 PROCEDURE — 86140 C-REACTIVE PROTEIN: CPT | Performed by: EMERGENCY MEDICINE

## 2020-03-08 PROCEDURE — 80053 COMPREHEN METABOLIC PANEL: CPT | Performed by: EMERGENCY MEDICINE

## 2020-03-08 PROCEDURE — 96365 THER/PROPH/DIAG IV INF INIT: CPT

## 2020-03-08 PROCEDURE — 96375 TX/PRO/DX INJ NEW DRUG ADDON: CPT

## 2020-03-08 PROCEDURE — 99285 EMERGENCY DEPT VISIT HI MDM: CPT

## 2020-03-08 PROCEDURE — 85652 RBC SED RATE AUTOMATED: CPT | Performed by: EMERGENCY MEDICINE

## 2020-03-08 PROCEDURE — 85025 COMPLETE CBC W/AUTO DIFF WBC: CPT | Performed by: EMERGENCY MEDICINE

## 2020-03-08 PROCEDURE — 72128 CT CHEST SPINE W/O DYE: CPT

## 2020-03-08 RX ORDER — PROPRANOLOL HYDROCHLORIDE 20 MG/1
20 TABLET ORAL 2 TIMES DAILY
Status: DISCONTINUED | OUTPATIENT
Start: 2020-03-09 | End: 2020-03-19 | Stop reason: HOSPADM

## 2020-03-08 RX ORDER — QUETIAPINE 300 MG/1
300 TABLET, FILM COATED, EXTENDED RELEASE ORAL
Status: DISCONTINUED | OUTPATIENT
Start: 2020-03-08 | End: 2020-03-08

## 2020-03-08 RX ORDER — ONDANSETRON 2 MG/ML
4 INJECTION INTRAMUSCULAR; INTRAVENOUS EVERY 6 HOURS PRN
Status: DISCONTINUED | OUTPATIENT
Start: 2020-03-08 | End: 2020-03-19 | Stop reason: HOSPADM

## 2020-03-08 RX ORDER — PANTOPRAZOLE SODIUM 40 MG/1
40 TABLET, DELAYED RELEASE ORAL
Status: DISCONTINUED | OUTPATIENT
Start: 2020-03-09 | End: 2020-03-09

## 2020-03-08 RX ORDER — ACETAMINOPHEN 325 MG/1
650 TABLET ORAL EVERY 4 HOURS PRN
Status: DISCONTINUED | OUTPATIENT
Start: 2020-03-08 | End: 2020-03-08

## 2020-03-08 RX ORDER — ONDANSETRON 2 MG/ML
INJECTION INTRAMUSCULAR; INTRAVENOUS
Status: COMPLETED
Start: 2020-03-08 | End: 2020-03-08

## 2020-03-08 RX ORDER — RISPERIDONE 1 MG/1
2 TABLET, FILM COATED ORAL DAILY
Status: DISCONTINUED | OUTPATIENT
Start: 2020-03-09 | End: 2020-03-08

## 2020-03-08 RX ORDER — AMOXICILLIN 250 MG
1 CAPSULE ORAL DAILY
Status: DISCONTINUED | OUTPATIENT
Start: 2020-03-09 | End: 2020-03-19 | Stop reason: HOSPADM

## 2020-03-08 RX ORDER — LORATADINE 10 MG/1
10 TABLET ORAL DAILY
Status: DISCONTINUED | OUTPATIENT
Start: 2020-03-09 | End: 2020-03-19 | Stop reason: HOSPADM

## 2020-03-08 RX ORDER — OXYCODONE HYDROCHLORIDE 10 MG/1
10 TABLET ORAL EVERY 4 HOURS PRN
Status: DISCONTINUED | OUTPATIENT
Start: 2020-03-08 | End: 2020-03-16

## 2020-03-08 RX ORDER — PREGABALIN 100 MG/1
100 CAPSULE ORAL 3 TIMES DAILY
Status: DISCONTINUED | OUTPATIENT
Start: 2020-03-08 | End: 2020-03-19 | Stop reason: HOSPADM

## 2020-03-08 RX ORDER — MELOXICAM 7.5 MG/1
15 TABLET ORAL DAILY
Status: DISCONTINUED | OUTPATIENT
Start: 2020-03-09 | End: 2020-03-19 | Stop reason: HOSPADM

## 2020-03-08 RX ORDER — HYDROMORPHONE HYDROCHLORIDE 2 MG/1
1 TABLET ORAL EVERY 6 HOURS PRN
Status: DISCONTINUED | OUTPATIENT
Start: 2020-03-08 | End: 2020-03-16

## 2020-03-08 RX ORDER — AMLODIPINE BESYLATE 10 MG/1
10 TABLET ORAL DAILY
Status: DISCONTINUED | OUTPATIENT
Start: 2020-03-08 | End: 2020-03-09

## 2020-03-08 RX ORDER — LACTULOSE 20 G/30ML
20 SOLUTION ORAL 2 TIMES DAILY
Status: DISCONTINUED | OUTPATIENT
Start: 2020-03-09 | End: 2020-03-12

## 2020-03-08 RX ORDER — ONDANSETRON 2 MG/ML
4 INJECTION INTRAMUSCULAR; INTRAVENOUS ONCE
Status: COMPLETED | OUTPATIENT
Start: 2020-03-08 | End: 2020-03-08

## 2020-03-08 RX ORDER — OXYCODONE HYDROCHLORIDE 5 MG/1
5 TABLET ORAL EVERY 4 HOURS PRN
Status: DISCONTINUED | OUTPATIENT
Start: 2020-03-08 | End: 2020-03-16

## 2020-03-08 RX ORDER — IBUPROFEN 600 MG/1
600 TABLET ORAL EVERY 6 HOURS PRN
Status: DISCONTINUED | OUTPATIENT
Start: 2020-03-08 | End: 2020-03-09

## 2020-03-08 RX ORDER — DULOXETIN HYDROCHLORIDE 30 MG/1
30 CAPSULE, DELAYED RELEASE ORAL DAILY
Status: DISCONTINUED | OUTPATIENT
Start: 2020-03-09 | End: 2020-03-19 | Stop reason: HOSPADM

## 2020-03-08 RX ORDER — OXYBUTYNIN CHLORIDE 5 MG/1
5 TABLET ORAL 2 TIMES DAILY
Status: DISCONTINUED | OUTPATIENT
Start: 2020-03-09 | End: 2020-03-19 | Stop reason: HOSPADM

## 2020-03-08 RX ORDER — DULOXETIN HYDROCHLORIDE 60 MG/1
60 CAPSULE, DELAYED RELEASE ORAL DAILY
Status: DISCONTINUED | OUTPATIENT
Start: 2020-03-09 | End: 2020-03-09

## 2020-03-08 RX ORDER — BUSPIRONE HYDROCHLORIDE 5 MG/1
5 TABLET ORAL 3 TIMES DAILY
Status: DISCONTINUED | OUTPATIENT
Start: 2020-03-08 | End: 2020-03-19 | Stop reason: HOSPADM

## 2020-03-08 RX ORDER — HEPARIN SODIUM 5000 [USP'U]/ML
5000 INJECTION, SOLUTION INTRAVENOUS; SUBCUTANEOUS EVERY 8 HOURS SCHEDULED
Status: DISCONTINUED | OUTPATIENT
Start: 2020-03-08 | End: 2020-03-09

## 2020-03-08 RX ORDER — HYDROXYZINE HYDROCHLORIDE 25 MG/1
25 TABLET, FILM COATED ORAL EVERY 6 HOURS PRN
Status: DISCONTINUED | OUTPATIENT
Start: 2020-03-08 | End: 2020-03-13

## 2020-03-08 RX ORDER — HYDROMORPHONE HCL/PF 1 MG/ML
0.5 SYRINGE (ML) INJECTION ONCE
Status: COMPLETED | OUTPATIENT
Start: 2020-03-08 | End: 2020-03-08

## 2020-03-08 RX ORDER — MELATONIN
1000 DAILY
Status: DISCONTINUED | OUTPATIENT
Start: 2020-03-09 | End: 2020-03-19 | Stop reason: HOSPADM

## 2020-03-08 RX ADMIN — LIDOCAINE HYDROCHLORIDE 140 MG: 10 INJECTION, SOLUTION EPIDURAL; INFILTRATION; INTRACAUDAL; PERINEURAL at 20:50

## 2020-03-08 RX ADMIN — ONDANSETRON 4 MG: 2 INJECTION INTRAMUSCULAR; INTRAVENOUS at 22:34

## 2020-03-08 RX ADMIN — HYDROMORPHONE HYDROCHLORIDE 0.5 MG: 1 INJECTION, SOLUTION INTRAMUSCULAR; INTRAVENOUS; SUBCUTANEOUS at 22:16

## 2020-03-09 PROBLEM — R65.10 SIRS (SYSTEMIC INFLAMMATORY RESPONSE SYNDROME) (HCC): Status: ACTIVE | Noted: 2020-03-09

## 2020-03-09 PROBLEM — G24.01 TARDIVE DYSKINESIA: Status: ACTIVE | Noted: 2020-03-09

## 2020-03-09 PROBLEM — R50.9 MILD FEVER: Status: ACTIVE | Noted: 2020-03-09

## 2020-03-09 LAB
ALBUMIN SERPL BCP-MCNC: 3.6 G/DL (ref 3.5–5)
ALP SERPL-CCNC: 120 U/L (ref 46–116)
ALT SERPL W P-5'-P-CCNC: 16 U/L (ref 12–78)
ANION GAP SERPL CALCULATED.3IONS-SCNC: 2 MMOL/L (ref 4–13)
ANION GAP SERPL CALCULATED.3IONS-SCNC: 7 MMOL/L (ref 4–13)
AST SERPL W P-5'-P-CCNC: 19 U/L (ref 5–45)
ATRIAL RATE: 75 BPM
ATRIAL RATE: 78 BPM
BILIRUB SERPL-MCNC: 1.4 MG/DL (ref 0.2–1)
BUN SERPL-MCNC: 7 MG/DL (ref 5–25)
BUN SERPL-MCNC: 9 MG/DL (ref 5–25)
CALCIUM SERPL-MCNC: 8.4 MG/DL (ref 8.3–10.1)
CALCIUM SERPL-MCNC: 8.6 MG/DL (ref 8.3–10.1)
CHLORIDE SERPL-SCNC: 100 MMOL/L (ref 100–108)
CHLORIDE SERPL-SCNC: 109 MMOL/L (ref 100–108)
CO2 SERPL-SCNC: 27 MMOL/L (ref 21–32)
CO2 SERPL-SCNC: 29 MMOL/L (ref 21–32)
CREAT SERPL-MCNC: 0.72 MG/DL (ref 0.6–1.3)
CREAT SERPL-MCNC: 0.89 MG/DL (ref 0.6–1.3)
ERYTHROCYTE [DISTWIDTH] IN BLOOD BY AUTOMATED COUNT: 12.8 % (ref 11.6–15.1)
GFR SERPL CREATININE-BSD FRML MDRD: 108 ML/MIN/1.73SQ M
GFR SERPL CREATININE-BSD FRML MDRD: 84 ML/MIN/1.73SQ M
GLUCOSE SERPL-MCNC: 118 MG/DL (ref 65–140)
GLUCOSE SERPL-MCNC: 121 MG/DL (ref 65–140)
HCT VFR BLD AUTO: 43.1 % (ref 34.8–46.1)
HGB BLD-MCNC: 14.2 G/DL (ref 11.5–15.4)
LACTATE SERPL-SCNC: 1.3 MMOL/L (ref 0.5–2)
MAGNESIUM SERPL-MCNC: 2.5 MG/DL (ref 1.6–2.6)
MCH RBC QN AUTO: 29.3 PG (ref 26.8–34.3)
MCHC RBC AUTO-ENTMCNC: 32.9 G/DL (ref 31.4–37.4)
MCV RBC AUTO: 89 FL (ref 82–98)
PLATELET # BLD AUTO: 219 THOUSANDS/UL (ref 149–390)
PLATELET # BLD AUTO: 238 THOUSANDS/UL (ref 149–390)
PMV BLD AUTO: 9.4 FL (ref 8.9–12.7)
PMV BLD AUTO: 9.4 FL (ref 8.9–12.7)
POTASSIUM SERPL-SCNC: 2.9 MMOL/L (ref 3.5–5.3)
POTASSIUM SERPL-SCNC: 4.1 MMOL/L (ref 3.5–5.3)
PR INTERVAL: 128 MS
PR INTERVAL: 128 MS
PROT SERPL-MCNC: 7.4 G/DL (ref 6.4–8.2)
QRS AXIS: 144 DEGREES
QRS AXIS: 144 DEGREES
QRSD INTERVAL: 94 MS
QRSD INTERVAL: 98 MS
QT INTERVAL: 384 MS
QT INTERVAL: 390 MS
QTC INTERVAL: 435 MS
QTC INTERVAL: 437 MS
RBC # BLD AUTO: 4.85 MILLION/UL (ref 3.81–5.12)
SODIUM SERPL-SCNC: 136 MMOL/L (ref 136–145)
SODIUM SERPL-SCNC: 138 MMOL/L (ref 136–145)
T WAVE AXIS: -12 DEGREES
T WAVE AXIS: -34 DEGREES
TSH SERPL DL<=0.05 MIU/L-ACNC: 1.59 UIU/ML (ref 0.36–3.74)
VENTRICULAR RATE: 75 BPM
VENTRICULAR RATE: 78 BPM
WBC # BLD AUTO: 4.99 THOUSAND/UL (ref 4.31–10.16)

## 2020-03-09 PROCEDURE — 83605 ASSAY OF LACTIC ACID: CPT | Performed by: INTERNAL MEDICINE

## 2020-03-09 PROCEDURE — 80048 BASIC METABOLIC PNL TOTAL CA: CPT | Performed by: STUDENT IN AN ORGANIZED HEALTH CARE EDUCATION/TRAINING PROGRAM

## 2020-03-09 PROCEDURE — 93005 ELECTROCARDIOGRAM TRACING: CPT

## 2020-03-09 PROCEDURE — 85049 AUTOMATED PLATELET COUNT: CPT | Performed by: INTERNAL MEDICINE

## 2020-03-09 PROCEDURE — 93010 ELECTROCARDIOGRAM REPORT: CPT | Performed by: INTERNAL MEDICINE

## 2020-03-09 PROCEDURE — 87040 BLOOD CULTURE FOR BACTERIA: CPT | Performed by: INTERNAL MEDICINE

## 2020-03-09 PROCEDURE — NC001 PR NO CHARGE: Performed by: INTERNAL MEDICINE

## 2020-03-09 PROCEDURE — 84443 ASSAY THYROID STIM HORMONE: CPT | Performed by: INTERNAL MEDICINE

## 2020-03-09 PROCEDURE — 85027 COMPLETE CBC AUTOMATED: CPT | Performed by: INTERNAL MEDICINE

## 2020-03-09 PROCEDURE — 99220 PR INITIAL OBSERVATION CARE/DAY 70 MINUTES: CPT | Performed by: INTERNAL MEDICINE

## 2020-03-09 PROCEDURE — 83735 ASSAY OF MAGNESIUM: CPT | Performed by: STUDENT IN AN ORGANIZED HEALTH CARE EDUCATION/TRAINING PROGRAM

## 2020-03-09 PROCEDURE — 80053 COMPREHEN METABOLIC PANEL: CPT | Performed by: INTERNAL MEDICINE

## 2020-03-09 RX ORDER — QUETIAPINE 300 MG/1
300 TABLET, FILM COATED, EXTENDED RELEASE ORAL
Status: DISCONTINUED | OUTPATIENT
Start: 2020-03-09 | End: 2020-03-19 | Stop reason: HOSPADM

## 2020-03-09 RX ORDER — POTASSIUM CHLORIDE 20 MEQ/1
40 TABLET, EXTENDED RELEASE ORAL ONCE
Status: COMPLETED | OUTPATIENT
Start: 2020-03-09 | End: 2020-03-09

## 2020-03-09 RX ORDER — HYDROMORPHONE HYDROCHLORIDE 2 MG/1
2 TABLET ORAL ONCE AS NEEDED
Status: DISCONTINUED | OUTPATIENT
Start: 2020-03-09 | End: 2020-03-09

## 2020-03-09 RX ORDER — PRAZOSIN HYDROCHLORIDE 2 MG/1
2 CAPSULE ORAL DAILY
Status: DISCONTINUED | OUTPATIENT
Start: 2020-03-09 | End: 2020-03-19 | Stop reason: HOSPADM

## 2020-03-09 RX ORDER — MELOXICAM 7.5 MG/1
TABLET ORAL
Status: COMPLETED
Start: 2020-03-09 | End: 2020-03-09

## 2020-03-09 RX ORDER — LORAZEPAM 0.5 MG/1
0.5 TABLET ORAL EVERY 8 HOURS PRN
Status: DISCONTINUED | OUTPATIENT
Start: 2020-03-09 | End: 2020-03-19 | Stop reason: HOSPADM

## 2020-03-09 RX ORDER — PANTOPRAZOLE SODIUM 20 MG/1
20 TABLET, DELAYED RELEASE ORAL
Status: DISCONTINUED | OUTPATIENT
Start: 2020-03-10 | End: 2020-03-19 | Stop reason: HOSPADM

## 2020-03-09 RX ORDER — METOCLOPRAMIDE HYDROCHLORIDE 5 MG/ML
10 INJECTION INTRAMUSCULAR; INTRAVENOUS EVERY 8 HOURS SCHEDULED
Status: DISCONTINUED | OUTPATIENT
Start: 2020-03-09 | End: 2020-03-09

## 2020-03-09 RX ORDER — MAGNESIUM SULFATE HEPTAHYDRATE 40 MG/ML
2 INJECTION, SOLUTION INTRAVENOUS ONCE
Status: COMPLETED | OUTPATIENT
Start: 2020-03-09 | End: 2020-03-09

## 2020-03-09 RX ORDER — LORAZEPAM 0.5 MG/1
0.5 TABLET ORAL ONCE AS NEEDED
Status: COMPLETED | OUTPATIENT
Start: 2020-03-09 | End: 2020-03-09

## 2020-03-09 RX ORDER — LORAZEPAM 0.5 MG/1
TABLET ORAL
Status: COMPLETED
Start: 2020-03-09 | End: 2020-03-09

## 2020-03-09 RX ORDER — CYCLOBENZAPRINE HCL 10 MG
5 TABLET ORAL 3 TIMES DAILY PRN
Status: DISCONTINUED | OUTPATIENT
Start: 2020-03-09 | End: 2020-03-19 | Stop reason: HOSPADM

## 2020-03-09 RX ORDER — HYDROMORPHONE HYDROCHLORIDE 2 MG/1
2 TABLET ORAL ONCE AS NEEDED
Status: COMPLETED | OUTPATIENT
Start: 2020-03-09 | End: 2020-03-09

## 2020-03-09 RX ORDER — POTASSIUM CHLORIDE 14.9 MG/ML
20 INJECTION INTRAVENOUS ONCE
Status: COMPLETED | OUTPATIENT
Start: 2020-03-09 | End: 2020-03-09

## 2020-03-09 RX ORDER — KETOROLAC TROMETHAMINE 30 MG/ML
30 INJECTION, SOLUTION INTRAMUSCULAR; INTRAVENOUS EVERY 12 HOURS SCHEDULED
Status: DISCONTINUED | OUTPATIENT
Start: 2020-03-09 | End: 2020-03-09

## 2020-03-09 RX ORDER — DULOXETIN HYDROCHLORIDE 60 MG/1
60 CAPSULE, DELAYED RELEASE ORAL
Status: DISCONTINUED | OUTPATIENT
Start: 2020-03-09 | End: 2020-03-19 | Stop reason: HOSPADM

## 2020-03-09 RX ORDER — AMLODIPINE BESYLATE 5 MG/1
5 TABLET ORAL DAILY
Status: DISCONTINUED | OUTPATIENT
Start: 2020-03-09 | End: 2020-03-19 | Stop reason: HOSPADM

## 2020-03-09 RX ORDER — SODIUM CHLORIDE 9 MG/ML
100 INJECTION, SOLUTION INTRAVENOUS CONTINUOUS
Status: DISCONTINUED | OUTPATIENT
Start: 2020-03-09 | End: 2020-03-14

## 2020-03-09 RX ORDER — HEPARIN SODIUM 5000 [USP'U]/ML
7500 INJECTION, SOLUTION INTRAVENOUS; SUBCUTANEOUS EVERY 8 HOURS SCHEDULED
Status: DISCONTINUED | OUTPATIENT
Start: 2020-03-09 | End: 2020-03-19 | Stop reason: HOSPADM

## 2020-03-09 RX ORDER — POTASSIUM CHLORIDE 14.9 MG/ML
20 INJECTION INTRAVENOUS
Status: COMPLETED | OUTPATIENT
Start: 2020-03-09 | End: 2020-03-10

## 2020-03-09 RX ORDER — VANCOMYCIN HYDROCHLORIDE 1 G/200ML
15 INJECTION, SOLUTION INTRAVENOUS EVERY 12 HOURS
Status: DISCONTINUED | OUTPATIENT
Start: 2020-03-09 | End: 2020-03-09

## 2020-03-09 RX ORDER — DIPHENHYDRAMINE HYDROCHLORIDE 50 MG/ML
25 INJECTION INTRAMUSCULAR; INTRAVENOUS EVERY 8 HOURS PRN
Status: DISPENSED | OUTPATIENT
Start: 2020-03-09 | End: 2020-03-12

## 2020-03-09 RX ADMIN — OXYCODONE HYDROCHLORIDE 10 MG: 10 TABLET ORAL at 20:32

## 2020-03-09 RX ADMIN — POTASSIUM CHLORIDE 20 MEQ: 14.9 INJECTION, SOLUTION INTRAVENOUS at 09:44

## 2020-03-09 RX ADMIN — POTASSIUM CHLORIDE 20 MEQ: 14.9 INJECTION, SOLUTION INTRAVENOUS at 13:17

## 2020-03-09 RX ADMIN — HEPARIN SODIUM 7500 UNITS: 5000 INJECTION INTRAVENOUS; SUBCUTANEOUS at 21:10

## 2020-03-09 RX ADMIN — TRAZODONE HYDROCHLORIDE 150 MG: 100 TABLET ORAL at 00:57

## 2020-03-09 RX ADMIN — BUSPIRONE HYDROCHLORIDE 5 MG: 5 TABLET ORAL at 00:57

## 2020-03-09 RX ADMIN — PREGABALIN 100 MG: 100 CAPSULE ORAL at 20:32

## 2020-03-09 RX ADMIN — CEFEPIME HYDROCHLORIDE 2000 MG: 2 INJECTION, POWDER, FOR SOLUTION INTRAVENOUS at 02:52

## 2020-03-09 RX ADMIN — DICLOFENAC 4 G: 10 GEL TOPICAL at 09:00

## 2020-03-09 RX ADMIN — PREGABALIN 100 MG: 100 CAPSULE ORAL at 00:57

## 2020-03-09 RX ADMIN — PREGABALIN 100 MG: 100 CAPSULE ORAL at 15:48

## 2020-03-09 RX ADMIN — QUETIAPINE FUMARATE 300 MG: 300 TABLET, EXTENDED RELEASE ORAL at 21:12

## 2020-03-09 RX ADMIN — LORAZEPAM 0.5 MG: 0.5 TABLET ORAL at 23:38

## 2020-03-09 RX ADMIN — SENNOSIDES AND DOCUSATE SODIUM 1 TABLET: 8.6; 5 TABLET ORAL at 10:43

## 2020-03-09 RX ADMIN — PREGABALIN 100 MG: 100 CAPSULE ORAL at 10:43

## 2020-03-09 RX ADMIN — LORATADINE 10 MG: 10 TABLET ORAL at 10:42

## 2020-03-09 RX ADMIN — DICLOFENAC 4 G: 10 GEL TOPICAL at 17:57

## 2020-03-09 RX ADMIN — CYCLOBENZAPRINE HYDROCHLORIDE 5 MG: 10 TABLET, FILM COATED ORAL at 15:48

## 2020-03-09 RX ADMIN — DULOXETINE HYDROCHLORIDE 60 MG: 60 CAPSULE, DELAYED RELEASE ORAL at 21:11

## 2020-03-09 RX ADMIN — POTASSIUM CHLORIDE 20 MEQ: 14.9 INJECTION, SOLUTION INTRAVENOUS at 05:26

## 2020-03-09 RX ADMIN — KETOROLAC TROMETHAMINE 30 MG: 30 INJECTION, SOLUTION INTRAMUSCULAR at 10:41

## 2020-03-09 RX ADMIN — POTASSIUM CHLORIDE 40 MEQ: 1500 TABLET, EXTENDED RELEASE ORAL at 13:16

## 2020-03-09 RX ADMIN — DICLOFENAC 4 G: 10 GEL TOPICAL at 11:50

## 2020-03-09 RX ADMIN — KETOROLAC TROMETHAMINE 30 MG: 30 INJECTION, SOLUTION INTRAMUSCULAR at 00:56

## 2020-03-09 RX ADMIN — OXYBUTYNIN CHLORIDE 5 MG: 5 TABLET ORAL at 17:57

## 2020-03-09 RX ADMIN — MAGNESIUM SULFATE HEPTAHYDRATE 2 G: 40 INJECTION, SOLUTION INTRAVENOUS at 00:58

## 2020-03-09 RX ADMIN — BUSPIRONE HYDROCHLORIDE 5 MG: 5 TABLET ORAL at 20:32

## 2020-03-09 RX ADMIN — LORAZEPAM 0.5 MG: 0.5 TABLET ORAL at 02:36

## 2020-03-09 RX ADMIN — METOCLOPRAMIDE 10 MG: 5 INJECTION, SOLUTION INTRAMUSCULAR; INTRAVENOUS at 05:26

## 2020-03-09 RX ADMIN — METOCLOPRAMIDE 10 MG: 5 INJECTION, SOLUTION INTRAMUSCULAR; INTRAVENOUS at 00:56

## 2020-03-09 RX ADMIN — HEPARIN SODIUM 5000 UNITS: 5000 INJECTION INTRAVENOUS; SUBCUTANEOUS at 05:26

## 2020-03-09 RX ADMIN — HEPARIN SODIUM 5000 UNITS: 5000 INJECTION INTRAVENOUS; SUBCUTANEOUS at 00:56

## 2020-03-09 RX ADMIN — BISACODYL 10 MG: 5 TABLET, COATED ORAL at 10:40

## 2020-03-09 RX ADMIN — TRAZODONE HYDROCHLORIDE 150 MG: 100 TABLET ORAL at 21:11

## 2020-03-09 RX ADMIN — PROPRANOLOL HYDROCHLORIDE 20 MG: 20 TABLET ORAL at 17:57

## 2020-03-09 RX ADMIN — SODIUM CHLORIDE 125 ML/HR: 0.9 INJECTION, SOLUTION INTRAVENOUS at 03:15

## 2020-03-09 RX ADMIN — MELOXICAM 15 MG: 7.5 TABLET ORAL at 11:50

## 2020-03-09 RX ADMIN — OXYCODONE HYDROCHLORIDE 10 MG: 10 TABLET ORAL at 15:49

## 2020-03-09 RX ADMIN — POTASSIUM CHLORIDE 40 MEQ: 1500 TABLET, EXTENDED RELEASE ORAL at 10:43

## 2020-03-09 RX ADMIN — LORAZEPAM 0.5 MG: 0.5 TABLET ORAL at 11:50

## 2020-03-09 RX ADMIN — AMLODIPINE BESYLATE 10 MG: 10 TABLET ORAL at 01:11

## 2020-03-09 RX ADMIN — SODIUM CHLORIDE 100 ML/HR: 0.9 INJECTION, SOLUTION INTRAVENOUS at 14:41

## 2020-03-09 RX ADMIN — OXYCODONE HYDROCHLORIDE 10 MG: 10 TABLET ORAL at 02:49

## 2020-03-09 RX ADMIN — DULOXETINE HYDROCHLORIDE 30 MG: 30 CAPSULE, DELAYED RELEASE ORAL at 10:41

## 2020-03-09 RX ADMIN — BUSPIRONE HYDROCHLORIDE 5 MG: 5 TABLET ORAL at 10:40

## 2020-03-09 RX ADMIN — HEPARIN SODIUM 5000 UNITS: 5000 INJECTION INTRAVENOUS; SUBCUTANEOUS at 13:17

## 2020-03-09 RX ADMIN — OXYBUTYNIN CHLORIDE 5 MG: 5 TABLET ORAL at 10:42

## 2020-03-09 RX ADMIN — HYDROMORPHONE HYDROCHLORIDE 2 MG: 2 TABLET ORAL at 23:37

## 2020-03-09 RX ADMIN — DICLOFENAC 4 G: 10 GEL TOPICAL at 21:21

## 2020-03-09 RX ADMIN — MELATONIN 1000 UNITS: at 10:41

## 2020-03-09 RX ADMIN — POTASSIUM CHLORIDE 20 MEQ: 14.9 INJECTION, SOLUTION INTRAVENOUS at 03:06

## 2020-03-09 RX ADMIN — PANTOPRAZOLE SODIUM 40 MG: 40 TABLET, DELAYED RELEASE ORAL at 05:26

## 2020-03-09 RX ADMIN — METOCLOPRAMIDE 10 MG: 5 INJECTION, SOLUTION INTRAMUSCULAR; INTRAVENOUS at 13:17

## 2020-03-09 RX ADMIN — BUSPIRONE HYDROCHLORIDE 5 MG: 5 TABLET ORAL at 15:48

## 2020-03-09 RX ADMIN — LACTULOSE 20 G: 10 SOLUTION ORAL at 10:42

## 2020-03-09 RX ADMIN — LACTULOSE 20 G: 10 SOLUTION ORAL at 17:57

## 2020-03-09 RX ADMIN — VANCOMYCIN HYDROCHLORIDE 1000 MG: 1 INJECTION, SOLUTION INTRAVENOUS at 03:51

## 2020-03-09 NOTE — PHYSICAL THERAPY NOTE
Physical Therapy Cancellation Note    Pt admit overnight with intractable low back pain, MRI L/S pending, neurosurgery consult pending  Will follow for PT evaluation            Yared Irby, PT, DPT

## 2020-03-09 NOTE — ASSESSMENT & PLAN NOTE
Admits to having her typical migraine headaches on presentation  Will continue home dose of Propanolol 20 mg b i d  Will give IV magnesium and Toradol 15 mg b i d

## 2020-03-09 NOTE — ASSESSMENT & PLAN NOTE
Patient with long history of chronic back pain  Follows up with Neurosurgery outpatient  Patient recently evaluated by neurosurgery for possible spinal stimulator  Admits to trying Lyrica, ibuprofen, Tylenol, Cymbalta and was recently prescribed morphine 15 mg b i d  (60 tablets total) by her PCP  Patient admits that none of the pain medications have helped her with her pain so far  Improved with medrol dose pack

## 2020-03-09 NOTE — PROGRESS NOTES
INTERNAL MEDICINE RESIDENCY SENIOR ADMISSION NOTE     Name: Haider Moralez   Age & Sex: 62 y o  female   MRN: [id number]  Unit/Bed#: [facility] 822-01   Encounter: [id number]  Primary Care Provider: Gustavo Hutson MD    Patient seen and examined  Reviewed H&P per Dr Daniel    Agree with the assessment and plan with any exception/addition as noted below:    Principal Problem:    Intractable low back pain  Active Problems:    Chronic pain disorder    Bipolar II disorder (HCC)    Hypertension    Major depressive disorder, recurrent episode, moderate degree (HCC)    Migraine    Opioid dependence ([facility] [address] )    Morbid obesity with BMI of 40 0-44 9, adult ([facility] [address] )      Code Status: Level 1 - Full Code  Admission Status: OBSERVATION  Disposition: Patient requires Med/Surg  Expected Length of Stay: NA

## 2020-03-09 NOTE — PLAN OF CARE
Problem: Potential for Falls  Goal: Patient will remain free of falls  Description  INTERVENTIONS:  - Assess patient frequently for physical needs  -  Identify cognitive and physical deficits and behaviors that affect risk of falls    -  [place] fall precautions as indicated by assessment   - Educate patient/family on patient safety including physical limitations  - Instruct patient to call for assistance with activity based on assessment  - Modify environment to reduce risk of injury  - Consider OT/PT consult to assist with strengthening/mobility  Outcome: Progressing     Problem: Prexisting or High Potential for Compromised Skin Integrity  Goal: Skin integrity is maintained or improved  Description  INTERVENTIONS:  - Identify patients at risk for skin breakdown  - Assess and monitor skin integrity  - Assess and monitor nutrition and hydration status  - Monitor labs   - Assess for incontinence   - Turn and reposition patient  - Assist with mobility/ambulation  - Relieve pressure over bony prominences  - Avoid friction and shearing  - Provide appropriate hygiene as needed including keeping skin clean and dry  - Evaluate need for skin moisturizer/barrier cream  - Collaborate with interdisciplinary team   - Patient/family teaching  - Consider wound care consult   Outcome: Progressing     Problem: PAIN - ADULT  Goal: Verbalizes/displays adequate comfort level or baseline comfort level  Description  Interventions:  - Encourage patient to monitor pain and request assistance  - Assess pain using appropriate pain scale  - Administer analgesics based on type and severity of pain and evaluate response  - Implement non-pharmacological measures as appropriate and evaluate response  - Consider cultural and social influences on pain and pain management  - Notify physician/advanced practitioner if interventions unsuccessful or patient reports new pain  Outcome: Progressing     Problem: INFECTION - ADULT  Goal: Absence or prevention of progression during hospitalization  Description  INTERVENTIONS:  - Assess and monitor for signs and symptoms of infection  - Monitor lab/diagnostic results  - Monitor all insertion sites, i e  indwelling lines, tubes, and drains  - Monitor endotracheal if appropriate and nasal secretions for changes in amount and color  - [place] appropriate cooling/warming therapies per order  - Administer medications as ordered  - Instruct and encourage patient and family to use good hand hygiene technique  - Identify and instruct in appropriate isolation precautions for identified infection/condition  Outcome: Progressing  Goal: Absence of fever/infection during neutropenic period  Description  INTERVENTIONS:  - Monitor WBC    Outcome: Progressing     Problem: SAFETY ADULT  Goal: Patient will remain free of falls  Description  INTERVENTIONS:  - Assess patient frequently for physical needs  -  Identify cognitive and physical deficits and behaviors that affect risk of falls    -  [place] fall precautions as indicated by assessment   - Educate patient/family on patient safety including physical limitations  - Instruct patient to call for assistance with activity based on assessment  - Modify environment to reduce risk of injury  - Consider OT/PT consult to assist with strengthening/mobility  Outcome: Progressing  Goal: Maintain or return to baseline ADL function  Description  INTERVENTIONS:  -  Assess patient's ability to carry out ADLs; assess patient's baseline for ADL function and identify physical deficits which impact ability to perform ADLs (bathing, care of mouth/teeth, toileting, grooming, dressing, etc )  - Assess/evaluate cause of self-care deficits   - Assess range of motion  - Assess patient's mobility; develop plan if impaired  - Assess patient's need for assistive devices and provide as appropriate  - Encourage maximum independence but intervene and supervise when necessary  - Involve family in performance of ADLs  - Assess for home care needs following discharge   - Consider OT consult to assist with ADL evaluation and planning for discharge  - Provide patient education as appropriate  Outcome: Progressing  Goal: Maintain or return mobility status to optimal level  Description  INTERVENTIONS:  - Assess patient's baseline mobility status (ambulation, transfers, stairs, etc )    - Identify cognitive and physical deficits and behaviors that affect mobility  - Identify mobility aids required to assist with transfers and/or ambulation (gait belt, sit-to-stand, lift, walker, cane, etc )  - [place] fall precautions as indicated by assessment  - Record patient progress and toleration of activity level on Mobility SBAR; progress patient to next Phase/Stage  - Instruct patient to call for assistance with activity based on assessment  - Consider rehabilitation consult to assist with strengthening/weightbearing, etc   Outcome: Progressing     Problem: DISCHARGE PLANNING  Goal: Discharge to home or other facility with appropriate resources  Description  INTERVENTIONS:  - Identify barriers to discharge w/patient and caregiver  - Arrange for needed discharge resources and transportation as appropriate  - Identify discharge learning needs (meds, wound care, etc )  - Arrange for interpretive services to assist at discharge as needed  - Refer to Case Management Department for coordinating discharge planning if the patient needs post-hospital services based on physician/advanced practitioner order or complex needs related to functional status, cognitive ability, or social support system  Outcome: Progressing     Problem: Knowledge Deficit  Goal: Patient/family/caregiver demonstrates understanding of disease process, treatment plan, medications, and discharge instructions  Description  Complete learning assessment and assess knowledge base    Interventions:  - Provide teaching at level of understanding  - Provide teaching via preferred learning methods  Outcome: Progressing

## 2020-03-09 NOTE — ASSESSMENT & PLAN NOTE
Will continue Cymbalta 90 mg total for history of depression    Will hold on to risperidone and quetiapine since patient has severe tardive dyskinesia on physical exam

## 2020-03-09 NOTE — ASSESSMENT & PLAN NOTE
With fever of 102 8  Repeat temperature 100 6°  Down trending blood pressure but no episodes of hypotension  Patient denied any URI symptoms or urinary symptoms  Unclear source at this time  Blood  Cultures negative at 24 hours       Plan:  · Continue to monitor fever and WBC daily

## 2020-03-09 NOTE — ED ATTENDING ATTESTATION
3/8/2020  IPrasanth MD, saw and evaluated the patient  I have discussed the patient with the resident/non-physician practitioner and agree with the resident's/non-physician practitioner's findings, Plan of Care, and MDM as documented in the resident's/non-physician practitioner's note, except where noted  All available labs and Radiology studies were reviewed  I was present for key portions of any procedure(s) performed by the resident/non-physician practitioner and I was immediately available to provide assistance  At this point I agree with the current assessment done in the Emergency Department  I have conducted an independent evaluation of this patient a history and physical is as follows:    59-year-old woman with worsening low back pain today, also with chronic lower extremity pain  No new weakness, numbness or tingling but patient said is very painful to walk  No fever or chills  Denies recent injury  Denies history of IV drug abuse  States maybe had episode of fecal incontinence today but no urinary incontinence or retention  She is awake and alert, appears in pain distress  There is tenderness over the thoracic and lumbar spine as well as paraspinal tenderness  Grossly normal motor and sensory although difficult to examine due to difficulty cooperating with exam   Normal patellar and Achilles DTRs  Patient was scheduled to have CT scan, we get this scan here and there was abnormality though was difficult to completely characterize due to motion  CRP is elevated  We will admit patient for further evaluation and likely need for MRI      ED Course         Critical Care Time  Procedures

## 2020-03-09 NOTE — ASSESSMENT & PLAN NOTE
History of chronic low back pain and bilateral lower extremity pain  Patient came in today for worsening of her pain  Labs significant for CRP for 48 2, sed rate 16, alk phos 131  CT scan Lucency surrounding the superior pedicle screw on the right at T7 with paraspinal soft tissue thickening and endplate erosion   Findings suggest loosening and possible infection   Mild irregularity of endplates is seen at Z0-3 and T7-8   The spinal canal is not well evaluated this level due to streak artifact versus any canal narrowing is not excluded  Lucency surrounds the left S1 pedicle screw which also suggests possible loosening and degenerative changes at L5-S1  Thoracic MRI limited, unable to rule out infectious etiology due to poor study    Lumbar MRI, limited however no gross pathologic processes       Plan:  · Pain control:  Oxycodone 7 5 mg Q 6 p r n  for moderate pain and severe pain, MS Contin 15 mg bid   · Neurosurgical outpatient follow-up for possible spinal stimulator   · Heating pad prn   · Lidocain patch at T12 paraspinal   · Medrol dose pack

## 2020-03-09 NOTE — H&P
INTERNAL MEDICINE RESIDENCY ADMISSION H&P     Name: Sea Pradhan   Age & Sex: 62 y o  female   MRN: [id number]  Unit/Bed#: [facility] 822-01   Encounter: [id number]  Primary Care Provider: Zoe Awad MD    Code Status: Level 1 - Full Code  Admission Status: INPATIENT   Disposition: Patient requires Med/Surg    ASSESSMENT/PLAN     Principal Problem:    Intractable low back pain  Active Problems:    Chronic pain disorder    Bipolar II disorder (HCC)    Hypertension    Major depressive disorder, recurrent episode, moderate degree (HCC)    Migraine    Mild fever    Tardive dyskinesia      * Intractable low back pain  Assessment & Plan  History of chronic low back pain and bilateral lower extremity pain  Patient came in today for worsening of her pain  Labs significant for CRP for 48 2, sed rate 16, alk phos 131  CT scan Lucency surrounding the superior pedicle screw on the right at T7 with paraspinal soft tissue thickening and endplate erosion   Findings suggest loosening and possible infection   Mild irregularity of endplates is seen at N1-1 and T7-8   The spinal canal is not well evaluated this level due to streak artifact versus any canal narrowing is not excluded  Lucency surrounds the left S1 pedicle screw which also suggests possible loosening and degenerative changes at L5-S1  Plan:  · Will order MRI spine since CT scan study was limited due to patient moving  · Will keep patient NPO in case of any surgical intervention needed tomorrow  · Pain control:  Oxycodone 5 mg Q 4 p r n  for moderate pain, oxycodone 10 mg Q 4 p r n  For severe pain and hydromorphone 1 mg q 6 p r n  For breakthrough pain  · Will hold on to Tylenol for now since patient is spiking some mild fever  Will evaluate patient again for possible septic workup  · Patient has extensive medication list   Call her pharmacy for medication reconciliation    Patient does not know her home medication list     Tardive dyskinesia  Assessment & Plan  Patient currently on multiple psychiatric medications  Medication reconciliation is limited because patient does not know which medications she is taking  Patient was found to have tardive dyskinesia on physical exam   Will hold Seroquel, Klonopin and Lamictal   Call her pharmacy before initiating any medications  Mild fever  Assessment & Plan  With fever of 102 8  Repeat temperature 100 6°  Down trending blood pressure but no episodes of hypotension  Patient denied any URI symptoms or urinary symptoms  Unclear source at this time  Plan:  · Will start patient on vancomycin and cefepime  · Follow-up on blood culture and lactic acid  · Deescalate/discontinue antibiotics based on clinical improvement and above labs  · Continue to monitor fever and WBC daily    Migraine  Assessment & Plan  Admits to having her typical migraine headaches on presentation  Will continue home dose of Propanolol 20 mg b i d  Will give IV magnesium and Toradol 15 mg b i d     Major depressive disorder, recurrent episode, moderate degree (HCC)  Assessment & Plan  Will continue Cymbalta 90 mg total for history of depression  Will hold on to risperidone and quetiapine since patient has severe tardive dyskinesia on physical exam     Hypertension  Assessment & Plan  Will continue amlodipine 10 mg daily  Bipolar II disorder [facility])  Assessment & Plan  Call pharmacy before initiating any psychiatric medication  Patient needs close outpatient psychiatric follow-up  Will hold on to Klonopin, Lamictal, Seroquel, risperidone  Chronic pain disorder  Assessment & Plan  Patient with long history of chronic back pain  Follows up with Neurosurgery outpatient  Patient recently evaluated by neurosurgery for possible spinal stimulator  Admits to trying Lyrica, ibuprofen, Tylenol, Cymbalta and was recently prescribed morphine 15 mg b i d  (60 tablets total) by her PCP    Patient admits that none of the pain medications have helped her with her pain so far  VTE Pharmacologic Prophylaxis: Heparin  VTE Mechanical Prophylaxis: sequential compression device    CHIEF COMPLAINT     Chief Complaint   Patient presents with    Back Pain     chronic back and knee pain, reports worse today  pt walked from house outside to ambulance   Knee Pain      HISTORY OF PRESENT ILLNESS     This is 47-year-old female with extensive past medical history of chronic low back pain, GERD, anxiety, bilateral knee arthritis, bipolar disorder, depression, fibromyalgia, hypertension, migraine, stroke, overactive bladder, panic disorder, scoliosis status post back surgery who initially presented with worsening of her chronic low back pain and bilateral knee pain  Patient has history of chronic low back pain and bilateral knee pain  Admits that her pain is chronic and is the present for past many years  Patient admits that her pain has been progressively getting worse for past few days which brought her to come to ER  On presentation to ER, vitals were T 98 4°, HR 74, RR 22, /98, oxygen 100% room air  Labs showed potassium 3 1, alk-phos 131, T bili 1 49, normal WBC, sed rate 16, CRP 48 2  CT spine showed some degenerative changes at L5-S1, Lucency surrounding the superior pedicle screw on the right at T7 with paraspinal soft tissue thickening and endplate erosion   Findings suggest loosening and possible infection   Mild irregularity of endplates is seen at B1-0 and T7-8   The spinal canal is not well evaluated this level due to streak artifact versus any canal narrowing is not excluded  Patient received Dilaudid 0 5 mg IV, Zofran 4 mg IV and was admitted for further workup including spinal MRI  During my initial evaluation, patient was talking on the phone as I entered her room    Patient admits that her pain is chronic and she has tried multiple medications including Lyrica, ibuprofen, Tylenol, Cymbalta and was recently prescribed morphine 15 mg b i d  (60 tablets total)  Patient admits that none of the pain medications have helped her with her pain so far  Patient was constantly moving in her bed during my interview  Patient admitted to having history of scoliosis for which she had surgery in the past   Patient denied any chest pain, shortness of breath, abdominal pain, urinary incontinence, bladder incontinence, hematochezia, melena  Denies any recent weakness or numbness in her legs but does admits to having bilateral leg pain which is chronic for her  Upon chart review, Patient was recently seen at her PCP last month on February 20 and was prescribed morphine 15 mg b i d (60 tablets total)  Patient was also seen by [facility] neurosurgery on February 24th and plan was made to be evaluated for spinal cord stimulator implantation  REVIEW OF SYSTEMS     Review of Systems   Constitutional: Negative for chills, fatigue and fever  HENT: Negative for congestion, rhinorrhea, sinus pressure and sinus pain  Respiratory: Negative for apnea, cough, shortness of breath and wheezing  Cardiovascular: Negative for chest pain, palpitations and leg swelling  Gastrointestinal: Negative for abdominal distention, abdominal pain, constipation, diarrhea and nausea  Endocrine: Negative for cold intolerance, polydipsia and polyphagia  Musculoskeletal: Positive for back pain  Negative for arthralgias, joint swelling and myalgias  Bilateral leg pain  Skin: Negative for color change, rash and wound  Neurological: Negative for dizziness, seizures, weakness, light-headedness, numbness and headaches  Psychiatric/Behavioral: Negative for agitation and hallucinations  The patient is not nervous/anxious        OBJECTIVE     Vitals:    03/08/20 2308 03/08/20 2339 03/09/20 0217 03/09/20 0222   BP: 141/75   117/73   BP Location:       Pulse: 78   98   Resp: 18   19   Temp: (!) 102 8 °F (39 3 °C) (!) 100 8 °F (38 2 °C) (!) 100 6 °F (38 1 °C) 100 4 °F (38 °C)   TempSrc:  Rectal Rectal Oral   SpO2: 98%   98%   Weight: 93 8 kg (206 lb 12 7 oz)      Height: 5' (1 524 m)         Temperature:   Temp (24hrs), Av °F (37 8 °C), Min:98 4 °F (36 9 °C), Max:102 8 °F (39 3 °C)    Temperature: 100 4 °F (38 °C)  Intake & Output:  I/O        07 -  07 -  07    IV Piggyback  50    Total Intake  50    Net  +50              Weights:   IBW: 45 5 kg    Body mass index is 40 39 kg/m²  Weight (last 2 days)     Date/Time   Weight    20 2308   93 8 (206 79)            Physical Exam   Constitutional: She is oriented to person, place, and time  She appears well-developed and well-nourished  No distress  HENT:   Head: Normocephalic and atraumatic  Nose: Nose normal    Mouth/Throat: No oropharyngeal exudate  Eyes: Conjunctivae are normal  No scleral icterus  Neck: Neck supple  No JVD present  No tracheal deviation present  No thyromegaly present  Cardiovascular: Normal rate, regular rhythm, normal heart sounds and intact distal pulses  Exam reveals no gallop and no friction rub  No murmur heard  Pulmonary/Chest: Effort normal and breath sounds normal  No stridor  No respiratory distress  She has no wheezes  She has no rales  She exhibits no tenderness  Abdominal: Soft  Bowel sounds are normal  She exhibits no distension and no mass  There is no tenderness  There is no rebound and no guarding  Musculoskeletal: Normal range of motion  She exhibits no tenderness  Neurological: She is alert and oriented to person, place, and time  No cranial nerve deficit or sensory deficit  5/5 muscle strength and normal sensation to light touch in bilateral lower extremities  Skin: Skin is warm  No rash noted  She is not diaphoretic  No erythema  Psychiatric: She has a normal mood and affect  Her behavior is normal  Judgment and thought content normal    Vitals reviewed      PAST MEDICAL HISTORY     Past Medical History:   Diagnosis Date    Acid reflux     Anxiety     RESOLVED: [id number]    Arthritis     Bipolar 2 disorder (HCC)     FOLLOWS WITH PSYCHIATRIST  CONTINUE LAMOTRIGINE; RESOLVED: [id number]    Depression     Familial tremor     both hands    Fibromyalgia     LAST ASSESSED: [id number]    Hearing aid worn     left ear    [place]hard of hearing[place]     left ear    Hypertension     Left-sided weakness     Lower back pain     Memory loss of unknown cause     long and short term    Migraine     Overactive bladder     Panic attack     Post traumatic stress disorder     Seasonal allergies     Stroke [facility])     questionable stroke 2009    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    CONT ASA 81 MG DAILY; RESOLVED: [id number]    Urinary incontinence     Wears dentures     partial lower / full upper    Wears glasses      PAST SURGICAL HISTORY     Past Surgical History:   Procedure Laterality Date    BACK SURGERY       SECTION      COLONOSCOPY      RESOLVED: [id number]    EAR SURGERY      HYSTERECTOMY  2004    MYRINGOTOMY W/ TUBES Left     NECK SURGERY  2019    SC CYSTOURETHROSCOPY N/A 2016    Procedure: CYSTOSCOPY, BOTOX INJECTION;  Surgeon: Hong Paz MD;  Location: [facility];  Service: Gynecology    TONSILLECTOMY     [address] HISTORY     Social History     Substance and Sexual Activity   Alcohol Use No    Comment: 2 x year; being a social drinker as per all scripts      Substance and Sexual Activity   Alcohol Use No    Comment: 2 x year; being a social drinker as per all scripts         Substance and Sexual Activity   Drug Use No     Social History     Tobacco Use   Smoking Status Never Smoker   Smokeless Tobacco Never Used     Family History   Problem Relation Age of Onset    Colon cancer Mother     Alzheimer's disease Father     Stroke Father     Colon cancer Brother     Bipolar disorder Brother     Breast cancer Maternal Aunt     Colon cancer Maternal Aunt     Heart disease Other     Diabetes Other     Hypertension Other     Seizures Son     Depression Paternal Grandfather     No Known Problems Sister     No Known Problems Brother     Thyroid disease Neg Hx      LABORATORY DATA     Labs: I have personally reviewed pertinent reports  Results from last 7 days   Lab Units 03/09/20  0213 03/08/20  2129   WBC Thousand/uL  --  5 56   HEMOGLOBIN g/dL  --  14 6   HEMATOCRIT %  --  43 3   PLATELETS Thousands/uL 238 243   NEUTROS PCT %  --  86*   MONOS PCT %  --  7      Results from last 7 days   Lab Units 03/08/20  2129   POTASSIUM mmol/L 3 1*   CHLORIDE mmol/L 100   CO2 mmol/L 28   BUN mg/dL 10   CREATININE mg/dL 0 82   CALCIUM mg/dL 8 8   ALK PHOS U/L 131*   ALT U/L 12   AST U/L 14                  Results from last 7 days   Lab Units 03/09/20  0229   LACTIC ACID mmol/L 1 3         Micro:  No results found for: Marycarmen Backer, WOUNDCULT, SPUTUMCULTUR  IMAGING & DIAGNOSTIC TESTS     Imaging: I have personally reviewed pertinent reports  Ct Spine Thoracic Wo Contrast    Addendum Date: 3/8/2020    ADDENDUM: There is a typo in the conclusion  The 4th sentence should read "the spinal canal is not well evaluated at this level (T7) due to streak artifact; consequently, soft tissue canal narrowing is not excluded "  Clinical correlation with any neurologic signs including lower extremity weakness may be useful  Result Date: 3/8/2020  Impression: Lucency surrounding the superior pedicle screw on the right at T7 with paraspinal soft tissue thickening and endplate erosion  Findings suggest loosening and possible infection  Mild irregularity of endplates is seen at J4-8 and T7-8  The spinal canal  is not well evaluated this level due to streak artifact versus any canal narrowing is not excluded   Some endplate irregularity is seen at L5-S1 with also sclerosis which is more likely degenerative although some inflammatory component is not excluded  Lucency surrounds the left S1 pedicle screw which also suggests possible loosening  The right screw appears to pass into the right S1 foramen  Streak artifact does limit this study making assessment of canal narrowing difficult elsewhere  Possible erosion involving the right facet of L3-L4  Targeted MRI of the thoracic spine still may be useful with contrast to assess for any new marrow edema as compared to January as well as as well as any new canal involvement but may be limited due to streak artifact  Nuclear medicine study may also be useful  If prior CTs of the spine postoperatively are available that would be very helpful and an addendum could be rendered  Results were discussed with Dr Woo Granados in the emergency room Workstation performed: [id number]     Ct Spine Lumbar Wo Contrast    Addendum Date: 3/8/2020    ADDENDUM: There is a typo in the conclusion  The 4th sentence should read "the spinal canal is not well evaluated at this level (T7) due to streak artifact; consequently, soft tissue canal narrowing is not excluded "  Clinical correlation with any neurologic signs including lower extremity weakness may be useful  Result Date: 3/8/2020  Impression: Lucency surrounding the superior pedicle screw on the right at T7 with paraspinal soft tissue thickening and endplate erosion  Findings suggest loosening and possible infection  Mild irregularity of endplates is seen at Q6-4 and T7-8  The spinal canal  is not well evaluated this level due to streak artifact versus any canal narrowing is not excluded  Some endplate irregularity is seen at L5-S1 with also sclerosis which is more likely degenerative although some inflammatory component is not excluded  Lucency surrounds the left S1 pedicle screw which also suggests possible loosening  The right screw appears to pass into the right S1 foramen  Streak artifact does limit this study making assessment of canal narrowing difficult elsewhere  Possible erosion involving the right facet of L3-L4  Targeted MRI of the thoracic spine still may be useful with contrast to assess for any new marrow edema as compared to January as well as as well as any new canal involvement but may be limited due to streak artifact  Nuclear medicine study may also be useful  If prior CTs of the spine postoperatively are available that would be very helpful and an addendum could be rendered  Results were discussed with Dr Soto Sousa in the emergency room Workstation performed: [id number]     EKG, Pathology, and Other Studies: I have personally reviewed pertinent reports  ALLERGIES   No Known Allergies  MEDICATIONS PRIOR TO ARRIVAL     Prior to Admission medications    Medication Sig Start Date End Date Taking?  Authorizing Provider   amLODIPine (NORVASC) 10 mg tablet Take by mouth   Yes Historical Provider, MD   busPIRone (BUSPAR) 5 mg tablet TAKE 1 TABLET (5 MG TOTAL) BY MOUTH 3 (THREE) TIMES A DAY 10/7/19  Yes Ryan Suarez MD   cetirizine (ZyrTEC) 10 mg tablet  10/21/19  Yes Historical Provider, MD   Cholecalciferol (VITAMIN D3) 1000 units CAPS Take 1 capsule (1,000 Units total) by mouth daily 8/29/19  Yes Kaitlin Jones MD   clonazePAM (KlonoPIN) 1 mg tablet Take by mouth 11/29/13  Yes Historical Provider, MD   diclofenac sodium (VOLTAREN) 1 % Apply 4 g topically 4 (four) times a day As needed to affected area of the knee 2/25/20  Yes Park Banks PA-C   DULoxetine (CYMBALTA) 30 mg delayed release capsule Take 30 mg by mouth daily   Yes Historical Provider, MD   DULoxetine (CYMBALTA) 60 mg delayed release capsule Take 1 capsule (60 mg total) by mouth daily 11/14/19  Yes Ryan Suarez MD   hydrOXYzine HCL (ATARAX) 25 mg tablet Take 25 mg by mouth every 8 (eight) hours as needed   Yes Historical Provider, MD   ibuprofen (MOTRIN) 600 mg tablet Take 600 mg by mouth every 6 (six) hours as needed for mild pain   Yes Historical Provider, MD   lactulose ([address]) 10 g packet Take 10 g by mouth 3 (three) times a day   Yes Historical Provider, MD   senna-docusate sodium (SENOKOT-S) 8 6-50 mg per tablet Take 1 tablet by mouth daily   Yes Historical Provider, MD   SUMAtriptan (IMITREX) 50 mg tablet Take 1 tablet (50 mg total) by mouth once as needed for migraine for up to 1 dose 12/20/19  Yes Roberta Nissen, DO   venlafaxine 150 MG TB24 Take by mouth   Yes Historical Provider, MD   venlafaxine 75 mg 24 hr tablet Take by mouth   Yes Historical Provider, MD   acetaminophen (TYLENOL) 500 mg tablet Take 500 mg by mouth every 6 (six) hours as needed for mild pain    Historical Provider, MD   amLODIPine (NORVASC) 5 mg tablet Take 1 tablet (5 mg total) by mouth daily  Patient not taking: Reported on 3/8/2020 9/23/19   Ashley Hill MD   aspirin 81 MG tablet Take 1 tablet (81 mg total) by mouth daily  Patient not taking: Reported on 3/8/2020 8/30/19   Esperanza Carranza MD   clotrimazole (LOTRIMIN) 1 % cream Apply topically 2 (two) times a day  Patient not taking: Reported on 2/6/2020 1/16/20   Kole Coon MD   lamoTRIgine (LaMICtal) 100 mg tablet Take by mouth    Historical Provider, MD   LORazepam (ATIVAN) 0 5 mg tablet Take 0 5 mg by mouth every 8 (eight) hours as needed 11/25/19   Historical Provider, MD   lubiprostone (AMITIZA) 24 mcg capsule Take 1 capsule (24 mcg total) by mouth 2 (two) times a day with meals 8/23/19   Esperanza Carranza MD   meloxicam (MOBIC) 15 mg tablet Take 15 mg by mouth daily     Historical Provider, MD   morphine (MS CONTIN) 15 mg 12 hr tablet Take 1 tablet (15 mg total) by mouth 2 (two) times a dayMax Daily Amount: 30 mg 2/25/20   Regi Riddle MD   omeprazole (PriLOSEC) 20 mg delayed release capsule Take 1 capsule (20 mg total) by mouth daily 8/30/19   Esperanza Carranza MD   oxybutynin (DITROPAN) 5 mg tablet Take by mouth    Historical Provider, MD   oxybutynin (DITROPAN-XL) 10 MG 24 hr tablet Take 1 tablet (10 mg total) by mouth daily at bedtime 11/14/19   Lito Clark MD   polyethylene glycol (MIRALAX) 17 g Take by mouth    Historical Provider, MD   potassium chloride (KLOR-CON 10) 10 mEq tablet Take by mouth    Historical Provider, MD   prazosin (MINIPRESS) 1 mg capsule Take 1 capsule (1 mg total) by mouth daily at bedtime  Patient taking differently: Take 2 mg by mouth daily at bedtime  1/15/20   Eileen Ruelas MD   pregabalin (LYRICA) 100 mg capsule Take 1 capsule (100 mg total) by mouth 3 (three) times a day 1/30/20   [place] [facility]   primidone (MYSOLINE) 250 mg tablet Take 1 tablet (250 mg total) by mouth daily at bedtime 8/29/19   Anthony Carrillo MD   primidone (MYSOLINE) 250 mg tablet Take by mouth 4/2/15   Historical Provider, MD   propranolol (INDERAL) 20 mg tablet Take 1 tablet (20 mg total) by mouth 2 (two) times a day 8/29/19   Anthony Carrillo MD   QUEtiapine (SEROquel XR) 300 mg 24 hr tablet Take 1 tablet (300 mg total) by mouth daily at bedtime 8/23/19   Anthony Carrillo MD   risperiDONE (RisperDAL) 2 mg tablet Take by mouth    Historical Provider, MD   traMADol (ULTRAM) 50 mg tablet Take by mouth    Historical Provider, MD   traZODone (DESYREL) 100 mg tablet Take 50mg at night for a week, then increase to 100mg at night for a week, then increase to 150mg at night 1/27/20   Historical Provider, MD   traZODone (DESYREL) 100 mg tablet Take by mouth    Historical Provider, MD   Valbenazine Tosylate 80 MG CAPS Take 80 mg by mouth daily 11/18/19   Historical Provider, MD     MEDICATIONS ADMINISTERED IN LAST 24 HOURS     Medication Administration - last 24 hours from 03/08/2020 0335 to 03/09/2020 0335       Date/Time Order Dose Route Action Action by     03/08/2020 2120 lidocaine (XYLOCAINE) 1 % 140 mg in dextrose 5 % 50 mL IVPB 0 mg/kg Intravenous Stopped Liss Ortiz RN     03/08/2020 2050 lidocaine (XYLOCAINE) 1 % 140 mg in dextrose 5 % 50 mL IVPB 140 mg Intravenous [address] Kishan Wolff RN     03/08/2020 2011 HYDROmorphone (DILAUDID) injection 0 5 mg 0 5 mg Intravenous Given Amy Spain, RN     03/09/2020 0059 ondansetron (ZOFRAN) injection 4 mg 4 mg Intravenous Not Given [facility], RN     03/08/2020 2234 ondansetron (ZOFRAN) injection 4 mg 4 mg Intravenous Given Amy Spain, RN     03/09/2020 0111 amLODIPine (NORVASC) tablet 10 mg 10 mg Oral Given [facility], RN     03/09/2020 0057 busPIRone (BUSPAR) tablet 5 mg 5 mg Oral Given [facility], RN     03/09/2020 4407 diclofenac sodium (VOLTAREN) 1 % topical gel 4 g 4 g Topical Not Given [facility], RN     03/09/2020 0057 pregabalin (LYRICA) capsule 100 mg 100 mg Oral Given [facility], RN     03/09/2020 0059 QUEtiapine (SEROquel XR) 24 hr tablet 300 mg 300 mg Oral Not Given [facility], RN     03/09/2020 0056 heparin (porcine) subcutaneous injection 5,000 Units 5,000 Units Subcutaneous Given [facility], RN     03/09/2020 0249 oxyCODONE (ROXICODONE) immediate release tablet 10 mg 10 mg Oral Given [facility], RN     03/09/2020 0236 LORazepam (ATIVAN) tablet 0 5 mg 0 5 mg Oral Given [facility], RN     03/09/2020 0057 traZODone (DESYREL) tablet 150 mg 150 mg Oral Given [facility], RN     03/09/2020 0058 magnesium sulfate 2 g/50 mL IVPB (premix) 2 g 2 g Intravenous [address] [facility], RN     03/09/2020 0056 metoclopramide (REGLAN) injection 10 mg 10 mg Intravenous Given [facility], RN     03/09/2020 0056 ketorolac (TORADOL) injection 30 mg 30 mg Intravenous Given [facility], RN     03/09/2020 0252 cefepime (MAXIPIME) 2,000 mg in dextrose 5 % 50 mL IVPB 2,000 mg Intravenous [address] [facility], RN     03/09/2020 6461 potassium chloride 20 mEq IVPB (premix) 20 mEq Intravenous [address] Oral Holland RN     03/09/2020 0328 sodium chloride 0 9 % infusion 100 mL/hr Intravenous Rate/Dose Change Oral Holland RN     03/09/2020 0315 sodium chloride 0 9 % infusion 125 mL/hr Intravenous New Bag Oral Holland RN CURRENT MEDICATIONS       sodium chloride 100 mL/hr Last Rate: 100 mL/hr (03/09/20 0328)         Admission Time  I spent 1 hour admitting the patient  This involved direct patient contact where I performed a full history and physical, reviewing previous records, and reviewing laboratory and other diagnostic studies  Portions of the record may have been created with voice recognition software  Occasional wrong word or "sound a like" substitutions may have occurred due to the inherent limitations of voice recognition software    Read the chart carefully and recognize, using context, where substitutions have occurred     ==  Mone Hammer, [address] [place] [address]  Internal Medicine Residency PGY-1

## 2020-03-09 NOTE — ASSESSMENT & PLAN NOTE
Call pharmacy before initiating any psychiatric medication  Patient needs close outpatient psychiatric follow-up  Will hold on to Klonopin, Lamictal, Seroquel, risperidone

## 2020-03-09 NOTE — UTILIZATION REVIEW
Initial Clinical Review    Admission: Date/Time/Statement: Admission Orders (From admission, onward)     Ordered        03/08/20 2226  Place in Observation (expected length of stay for this patient is less than two midnights)  Once                   Orders Placed This Encounter   Procedures    Place in Observation (expected length of stay for this patient is less than two midnights)     Standing Status:   Standing     Number of Occurrences:   1     Order Specific Question:   Admitting Physician     Answer:   Lissette Kang [[id number]]     Order Specific Question:   Level of Care     Answer:   Med Surg [[id number]]     ED Arrival Information     Expected Arrival Acuity Means of Arrival Escorted By Service Admission Type    - 3/8/2020 18:37 [facility] [address] [facility]    Arrival Complaint    Back Pain        Chief Complaint   Patient presents with    Back Pain     chronic back and knee pain, reports worse today  pt walked from house outside to ambulance   Knee Pain     Assessment/Plan:   Ms Mary Howell is a 61 yo female who presents to the ED via EMS from home with c/o worsening chronic back pain over several days  CT spine showed loosening possible infection of superior pedicle screw on R T7  Pt has been seen by neurosurgery and is being evaluated for spinal cord stimulator implantation  PMH: chronic low back pain, GERD, anxiety, bilateral knee arthritis, bipolar disorder, depression, fibromyalgia, hypertension, migraine, stroke, overactive bladder, panic disorder, scoliosis status post back surgery  Pt is admitted to OBSERVATION status with Intractable LBP/Chronic pain disorder  - MRI spine, NPO , pain control  Tardive dyskinesia  - hold seroquel, klonopin, Lamictal for now  Mild fever - antibiotics, blood cultures, lactic acid  Migraine - propranolol, IV mag, Toradol  Major depressive disorder/bipolar disorder - Cymbalta, hold Risperidone, quetiapine r/t dyskinesia       3/9 afternoon update - no further orders  ED Triage Vitals [03/08/20 1909]   Temperature Pulse Respirations Blood Pressure SpO2   98 4 °F (36 9 °C) 74 22 (!) 193/98 100 %      Temp Source Heart Rate Source Patient Position - Orthostatic VS BP Location FiO2 (%)   Oral Monitor Lying Right arm --      Pain Score       9        Wt Readings from Last 1 Encounters:   03/08/20 93 8 kg (206 lb 12 7 oz)     Additional Vital Signs:   03/09/20 08:11:05  99 9 °F (37 7 °C)  84  20  108/67  81  95 %     03/09/20 0222  100 4 °F (38 °C)  98  19  117/73    98 %     03/09/20 0217  100 6 °F (38 1 °C)Abnormal                03/08/20 2339  100 8 °F (38 2 °C)Abnormal                03/08/20 2308  102 8 °F (39 3 °C)Abnormal   78  18  141/75    98 %  Nasal cannula   03/08/20 2105  98 4 °F (36 9 °C)  67  20  174/84Abnormal     100 %  None (Room air)   03/08/20 2045  98 4 °F (36 9 °C)  67  22  158/86    100 %  None (Room air)     Pertinent Labs/Diagnostic Test Results:     3/8 CT L spine - Lucency surrounding the superior pedicle screw on the right at T7 with paraspinal soft tissue thickening and endplate erosion  Findings suggest loosening and possible infection  Mild irregularity of endplates is seen at J9-5 and T7-8  The spinal canal is not well evaluated at this level (T7) due to streak artifact; consequently, soft tissue canal narrowing is not excluded "  Some endplate irregularity is seen at L5-S1 with also sclerosis which is more likely degenerative although some inflammatory component is not excluded  Lucency surrounds the left S1 pedicle screw which also suggests possible loosening  The right screw appears to pass into the right S1 foramen  Streak artifact does limit this study making assessment of canal narrowing difficult elsewhere  Possible erosion involving the right facet of L3-L4    Targeted MRI of the thoracic spine still may be useful with contrast to assess for any new marrow edema as compared to January as well as as well as any new canal involvement but may be limited due to streak artifact  Nuclear medicine study may also be useful  If prior CTs of the spine postoperatively are available that would be very helpful and an addendum could be rendered  Clinical correlation with any neurologic signs including lower extremity weakness may be useful  3/8 CT T spine - Lucency surrounding the superior pedicle screw on the right at T7 with paraspinal soft tissue thickening and endplate erosion  Findings suggest loosening and possible infection  Mild irregularity of endplates is seen at Z1-7 and T7-8  The 4th sentence should read "the spinal canal is not well evaluated at this level (T7) due to streak artifact; consequently, soft tissue canal narrowing is not excluded "             Some endplate irregularity is seen at L5-S1 with also sclerosis which is more likely degenerative although some inflammatory component is not excluded  Lucency surrounds the left S1 pedicle screw which also suggests possible loosening  The right screw appears to pass into the right S1 foramen  Streak artifact does limit this study making assessment of canal narrowing difficult elsewhere  Possible erosion involving the right facet of L3-L4  Targeted MRI of the thoracic spine still may be useful with contrast to assess for any new marrow edema as compared to January as well as as well as any new canal involvement but may be limited due to streak artifact  Nuclear medicine study may also be useful  If prior CTs of the spine postoperatively are available that would be very helpful and an addendum could be rendered      3/8 MRI T spine - pending     3/8 MRI L spine - pending       Results from last 7 days   Lab Units 03/09/20  0555 03/09/20  0213 03/08/20  2129   WBC Thousand/uL 4 99  --  5 56   HEMOGLOBIN g/dL 14 2  --  14 6   HEMATOCRIT % 43 1  --  43 3   PLATELETS Thousands/uL 219 238 243   NEUTROS ABS Thousands/µL  --   --  4 75     Results from last 7 days   Lab Units 03/09/20  0555 03/08/20  2129   SODIUM mmol/L 136 137   POTASSIUM mmol/L 2 9* 3 1*   CHLORIDE mmol/L 100 100   CO2 mmol/L 29 28   ANION GAP mmol/L 7 9   BUN mg/dL 9 10   CREATININE mg/dL 0 89 0 82   EGFR ml/min/1 73sq m 84 93   CALCIUM mg/dL 8 6 8 8     Results from last 7 days   Lab Units 03/09/20  0555 03/08/20  2129   AST U/L 19 14   ALT U/L 16 12   ALK PHOS U/L 120* 131*   TOTAL PROTEIN g/dL 7 4 7 6   ALBUMIN g/dL 3 6 3 8   TOTAL BILIRUBIN mg/dL 1 40* 1 49*     Results from last 7 days   Lab Units 03/09/20  0555 03/08/20  2129   GLUCOSE RANDOM mg/dL 121 121     Results from last 7 days   Lab Units 03/09/20  0555   TSH 3RD GENERATON uIU/mL 1 590     Results from last 7 days   Lab Units 03/09/20  0229   LACTIC ACID mmol/L 1 3     Results from last 7 days   Lab Units 03/08/20  2129   CRP mg/L 48 2*   SED RATE mm/hour 16     ED Treatment:   Medication Administration from 03/08/2020 1837 to 03/08/2020 2312    Date/Time Order Dose Route Action   03/08/2020 2050 lidocaine (XYLOCAINE) 1 % 140 mg in dextrose 5 % 50 mL IVPB 140 mg Intravenous New Bag   03/08/2020 2216 HYDROmorphone (DILAUDID) injection 0 5 mg 0 5 mg Intravenous Given   03/08/2020 2234 ondansetron (ZOFRAN) injection 4 mg 4 mg Intravenous Given        Past Medical History:   Diagnosis Date    Acid reflux     Anxiety     RESOLVED: [id number]    Arthritis     Bipolar 2 disorder (HCC)     FOLLOWS WITH PSYCHIATRIST   CONTINUE LAMOTRIGINE; RESOLVED: [id number]    Depression     Familial tremor     both hands    Fibromyalgia     LAST ASSESSED: [id number]    Hearing aid worn     left ear    [place] (hard of hearing)     left ear    Hypertension     Left-sided weakness     Lower back pain     Memory loss of unknown cause     long and short term    Migraine     Overactive bladder     Panic attack     Post traumatic stress disorder     Seasonal allergies     Stroke [facility])     questionable stroke 2009    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    CONT ASA 81 MG DAILY; RESOLVED: [id number]    Urinary incontinence     Wears dentures     partial lower / full upper    Wears glasses      Present on Admission:   Intractable low back pain   Hypertension   Bipolar II disorder ([address] )   Major depressive disorder, recurrent episode, moderate degree (HCC)   Chronic pain disorder   Migraine   Mild fever   Tardive dyskinesia    Admitting Diagnosis: Back pain [M54 9]     Age/Sex: 62 y o  female     Admission Orders:  Scheduled Medications:    Medications:  amLODIPine 10 mg Oral Daily   busPIRone 5 mg Oral TID   cefepime 2,000 mg Intravenous Q12H   cholecalciferol 1,000 Units Oral Daily   diclofenac sodium 4 g Topical 4x Daily   DULoxetine 30 mg Oral Daily   DULoxetine 60 mg Oral Daily   heparin (porcine) 5,000 Units Subcutaneous Q8H [address]   ketorolac 30 mg Intravenous Q12H [address]   lactulose 20 g Oral BID   loratadine 10 mg Oral Daily   meloxicam 15 mg Oral Daily   metoclopramide 10 mg Intravenous Q8H [address]   oxybutynin 5 mg Oral BID   pantoprazole 40 mg Oral Early Morning   potassium chloride 40 mEq Oral Once   potassium chloride 40 mEq Oral Once   potassium chloride 20 mEq Intravenous Once   potassium chloride 20 mEq Intravenous Once   pregabalin 100 mg Oral TID   propranolol 20 mg Oral BID   senna-docusate sodium 1 tablet Oral Daily   traZODone 150 mg Oral HS   vancomycin 15 mg/kg (Adjusted) Intravenous Q12H     Continuous IV Infusions:    sodium chloride 100 mL/hr Intravenous Continuous     PRN Meds:    diphenhydrAMINE 25 mg Intravenous Q8H PRN    HYDROmorphone 1 mg Oral Q6H PRN    hydrOXYzine HCL 25 mg Oral Q6H PRN    ibuprofen 600 mg Oral Q6H PRN    LORazepam 0 5 mg Oral Q8H PRN x2 3/9   ondansetron 4 mg Intravenous Q6H PRN    oxyCODONE 10 mg Oral Q4H PRN x1 3/9   oxyCODONE 5 mg Oral Q4H PRN      SCDs  Lidocatin infusion   MRI L and T spines  Regular diet  PT/OT EVAL/TX   IP CONSULT TO CASE MANAGEMENT  IP CONSULT TO NEUROSURGERY    Network Utilization Review Department  [email or web address]  ATTENTION: Please call with any questions or concerns to [phone] and carefully listen to the prompts so that you are directed to the right person  All voicemails are confidential   Leny Trinidad all requests for admission clinical reviews, approved or denied determinations and any other requests to dedicated fax number below belonging to the campus where the patient is receiving treatment   List of dedicated fax numbers for the Facilities:  [address] DENIALS (Administrative/Medical Necessity) [phone]   [address] (Maternity/NICU/Pediatrics) [phone]   Michael Garcia [phone]   Kenneth Haddad [phone]   Omi Paz [phone]   Lashay Harrison [phone]   [address] [place] [address] [phone]   [facility]  [phone]   [address] [facility] [address] [facility]  2401 [facility] 1000 [address] [facility] [phone]

## 2020-03-09 NOTE — ASSESSMENT & PLAN NOTE
Patient currently on multiple psychiatric medications  Medication reconciliation is limited because patient does not know which medications she is taking  Patient was found to have tardive dyskinesia on physical exam   Will hold Seroquel, Klonopin and Lamictal   Call her pharmacy before initiating any medications

## 2020-03-09 NOTE — OCCUPATIONAL THERAPY NOTE
Occupational Therapy Cancellation NOte        Patient Name: Berta Guzman  EBVMW'B Date: 3/9/2020    OT consult received, chart reviewed  Pt admitted with low back pain, neurosurgery consulted and MRI pending results  Will continue to follow for OT evaluation    Tye Tanner MOT, OTR/L

## 2020-03-10 ENCOUNTER — APPOINTMENT (OUTPATIENT)
Dept: RADIOLOGY | Facility: HOSPITAL | Age: 55
DRG: 560 | End: 2020-03-10
Payer: COMMERCIAL

## 2020-03-10 PROBLEM — R50.9 MILD FEVER: Status: RESOLVED | Noted: 2020-03-09 | Resolved: 2020-03-10

## 2020-03-10 LAB
ANION GAP SERPL CALCULATED.3IONS-SCNC: 5 MMOL/L (ref 4–13)
BASOPHILS # BLD AUTO: 0.02 THOUSANDS/ΜL (ref 0–0.1)
BASOPHILS NFR BLD AUTO: 1 % (ref 0–1)
BUN SERPL-MCNC: 6 MG/DL (ref 5–25)
CALCIUM SERPL-MCNC: 8.4 MG/DL (ref 8.3–10.1)
CHLORIDE SERPL-SCNC: 110 MMOL/L (ref 100–108)
CO2 SERPL-SCNC: 24 MMOL/L (ref 21–32)
CREAT SERPL-MCNC: 0.56 MG/DL (ref 0.6–1.3)
EOSINOPHIL # BLD AUTO: 0.15 THOUSAND/ΜL (ref 0–0.61)
EOSINOPHIL NFR BLD AUTO: 5 % (ref 0–6)
ERYTHROCYTE [DISTWIDTH] IN BLOOD BY AUTOMATED COUNT: 13 % (ref 11.6–15.1)
GFR SERPL CREATININE-BSD FRML MDRD: 121 ML/MIN/1.73SQ M
GLUCOSE SERPL-MCNC: 97 MG/DL (ref 65–140)
HCT VFR BLD AUTO: 37.3 % (ref 34.8–46.1)
HGB BLD-MCNC: 12.2 G/DL (ref 11.5–15.4)
IMM GRANULOCYTES # BLD AUTO: 0.01 THOUSAND/UL (ref 0–0.2)
IMM GRANULOCYTES NFR BLD AUTO: 0 % (ref 0–2)
LYMPHOCYTES # BLD AUTO: 0.97 THOUSANDS/ΜL (ref 0.6–4.47)
LYMPHOCYTES NFR BLD AUTO: 35 % (ref 14–44)
MCH RBC QN AUTO: 29.7 PG (ref 26.8–34.3)
MCHC RBC AUTO-ENTMCNC: 32.7 G/DL (ref 31.4–37.4)
MCV RBC AUTO: 91 FL (ref 82–98)
MONOCYTES # BLD AUTO: 0.44 THOUSAND/ΜL (ref 0.17–1.22)
MONOCYTES NFR BLD AUTO: 16 % (ref 4–12)
NEUTROPHILS # BLD AUTO: 1.2 THOUSANDS/ΜL (ref 1.85–7.62)
NEUTS SEG NFR BLD AUTO: 43 % (ref 43–75)
NRBC BLD AUTO-RTO: 0 /100 WBCS
PLATELET # BLD AUTO: 172 THOUSANDS/UL (ref 149–390)
PMV BLD AUTO: 9.2 FL (ref 8.9–12.7)
POTASSIUM SERPL-SCNC: 3.5 MMOL/L (ref 3.5–5.3)
RBC # BLD AUTO: 4.11 MILLION/UL (ref 3.81–5.12)
SODIUM SERPL-SCNC: 139 MMOL/L (ref 136–145)
WBC # BLD AUTO: 2.79 THOUSAND/UL (ref 4.31–10.16)

## 2020-03-10 PROCEDURE — 97163 PT EVAL HIGH COMPLEX 45 MIN: CPT

## 2020-03-10 PROCEDURE — 80048 BASIC METABOLIC PNL TOTAL CA: CPT | Performed by: STUDENT IN AN ORGANIZED HEALTH CARE EDUCATION/TRAINING PROGRAM

## 2020-03-10 PROCEDURE — 97167 OT EVAL HIGH COMPLEX 60 MIN: CPT

## 2020-03-10 PROCEDURE — 85025 COMPLETE CBC W/AUTO DIFF WBC: CPT | Performed by: STUDENT IN AN ORGANIZED HEALTH CARE EDUCATION/TRAINING PROGRAM

## 2020-03-10 PROCEDURE — 72146 MRI CHEST SPINE W/O DYE: CPT

## 2020-03-10 PROCEDURE — 99232 SBSQ HOSP IP/OBS MODERATE 35: CPT | Performed by: INTERNAL MEDICINE

## 2020-03-10 RX ORDER — LIDOCAINE 50 MG/G
1 PATCH TOPICAL
Status: DISCONTINUED | OUTPATIENT
Start: 2020-03-10 | End: 2020-03-19 | Stop reason: HOSPADM

## 2020-03-10 RX ORDER — LORAZEPAM 1 MG/1
1 TABLET ORAL ONCE AS NEEDED
Status: COMPLETED | OUTPATIENT
Start: 2020-03-10 | End: 2020-03-11

## 2020-03-10 RX ORDER — HYDROMORPHONE HCL/PF 1 MG/ML
1 SYRINGE (ML) INJECTION ONCE AS NEEDED
Status: COMPLETED | OUTPATIENT
Start: 2020-03-10 | End: 2020-03-11

## 2020-03-10 RX ADMIN — QUETIAPINE FUMARATE 300 MG: 300 TABLET, EXTENDED RELEASE ORAL at 21:06

## 2020-03-10 RX ADMIN — CYCLOBENZAPRINE HYDROCHLORIDE 5 MG: 10 TABLET, FILM COATED ORAL at 13:22

## 2020-03-10 RX ADMIN — DICLOFENAC 4 G: 10 GEL TOPICAL at 17:19

## 2020-03-10 RX ADMIN — MELATONIN 1000 UNITS: at 08:33

## 2020-03-10 RX ADMIN — AMLODIPINE BESYLATE 5 MG: 5 TABLET ORAL at 08:33

## 2020-03-10 RX ADMIN — PANTOPRAZOLE SODIUM 20 MG: 20 TABLET, DELAYED RELEASE ORAL at 05:59

## 2020-03-10 RX ADMIN — HEPARIN SODIUM 7500 UNITS: 5000 INJECTION INTRAVENOUS; SUBCUTANEOUS at 05:58

## 2020-03-10 RX ADMIN — BUSPIRONE HYDROCHLORIDE 5 MG: 5 TABLET ORAL at 17:18

## 2020-03-10 RX ADMIN — DICLOFENAC 4 G: 10 GEL TOPICAL at 11:02

## 2020-03-10 RX ADMIN — OXYBUTYNIN CHLORIDE 5 MG: 5 TABLET ORAL at 08:33

## 2020-03-10 RX ADMIN — LIDOCAINE 1 PATCH: 50 PATCH TOPICAL at 21:04

## 2020-03-10 RX ADMIN — OXYCODONE HYDROCHLORIDE 10 MG: 10 TABLET ORAL at 13:22

## 2020-03-10 RX ADMIN — BUSPIRONE HYDROCHLORIDE 5 MG: 5 TABLET ORAL at 08:33

## 2020-03-10 RX ADMIN — SENNOSIDES AND DOCUSATE SODIUM 1 TABLET: 8.6; 5 TABLET ORAL at 08:33

## 2020-03-10 RX ADMIN — LORATADINE 10 MG: 10 TABLET ORAL at 08:33

## 2020-03-10 RX ADMIN — HYDROMORPHONE HYDROCHLORIDE 1 MG: 2 TABLET ORAL at 21:03

## 2020-03-10 RX ADMIN — OXYBUTYNIN CHLORIDE 5 MG: 5 TABLET ORAL at 17:19

## 2020-03-10 RX ADMIN — LORAZEPAM 0.5 MG: 0.5 TABLET ORAL at 08:33

## 2020-03-10 RX ADMIN — OXYCODONE HYDROCHLORIDE 10 MG: 10 TABLET ORAL at 03:09

## 2020-03-10 RX ADMIN — SODIUM CHLORIDE 100 ML/HR: 0.9 INJECTION, SOLUTION INTRAVENOUS at 11:02

## 2020-03-10 RX ADMIN — SODIUM CHLORIDE 100 ML/HR: 0.9 INJECTION, SOLUTION INTRAVENOUS at 01:28

## 2020-03-10 RX ADMIN — PROPRANOLOL HYDROCHLORIDE 20 MG: 20 TABLET ORAL at 17:20

## 2020-03-10 RX ADMIN — OXYCODONE HYDROCHLORIDE 10 MG: 10 TABLET ORAL at 08:33

## 2020-03-10 RX ADMIN — LACTULOSE 20 G: 10 SOLUTION ORAL at 08:32

## 2020-03-10 RX ADMIN — SODIUM CHLORIDE 100 ML/HR: 0.9 INJECTION, SOLUTION INTRAVENOUS at 21:15

## 2020-03-10 RX ADMIN — LACTULOSE 20 G: 10 SOLUTION ORAL at 17:19

## 2020-03-10 RX ADMIN — PREGABALIN 100 MG: 100 CAPSULE ORAL at 08:33

## 2020-03-10 RX ADMIN — DICLOFENAC 4 G: 10 GEL TOPICAL at 08:34

## 2020-03-10 RX ADMIN — DULOXETINE HYDROCHLORIDE 60 MG: 60 CAPSULE, DELAYED RELEASE ORAL at 21:03

## 2020-03-10 RX ADMIN — HEPARIN SODIUM 7500 UNITS: 5000 INJECTION INTRAVENOUS; SUBCUTANEOUS at 21:04

## 2020-03-10 RX ADMIN — MELOXICAM 15 MG: 7.5 TABLET ORAL at 08:33

## 2020-03-10 RX ADMIN — PROPRANOLOL HYDROCHLORIDE 20 MG: 20 TABLET ORAL at 08:34

## 2020-03-10 RX ADMIN — HEPARIN SODIUM 7500 UNITS: 5000 INJECTION INTRAVENOUS; SUBCUTANEOUS at 13:22

## 2020-03-10 RX ADMIN — BISACODYL 10 MG: 5 TABLET, COATED ORAL at 08:33

## 2020-03-10 RX ADMIN — PREGABALIN 100 MG: 100 CAPSULE ORAL at 21:03

## 2020-03-10 RX ADMIN — BUSPIRONE HYDROCHLORIDE 5 MG: 5 TABLET ORAL at 21:03

## 2020-03-10 RX ADMIN — DICLOFENAC 4 G: 10 GEL TOPICAL at 21:05

## 2020-03-10 RX ADMIN — DULOXETINE HYDROCHLORIDE 30 MG: 30 CAPSULE, DELAYED RELEASE ORAL at 08:33

## 2020-03-10 RX ADMIN — ONDANSETRON 4 MG: 2 INJECTION INTRAMUSCULAR; INTRAVENOUS at 21:05

## 2020-03-10 RX ADMIN — PREGABALIN 100 MG: 100 CAPSULE ORAL at 17:19

## 2020-03-10 RX ADMIN — PRAZOSIN HYDROCHLORIDE 2 MG: 2 CAPSULE ORAL at 08:34

## 2020-03-10 RX ADMIN — TRAZODONE HYDROCHLORIDE 150 MG: 100 TABLET ORAL at 21:03

## 2020-03-10 RX ADMIN — OXYCODONE HYDROCHLORIDE 10 MG: 10 TABLET ORAL at 17:19

## 2020-03-10 RX ADMIN — CYCLOBENZAPRINE HYDROCHLORIDE 5 MG: 10 TABLET, FILM COATED ORAL at 01:45

## 2020-03-10 NOTE — PLAN OF CARE
Problem: PHYSICAL THERAPY ADULT  Goal: Performs mobility at highest level of function for planned discharge setting  See evaluation for individualized goals  Description  Treatment/Interventions: Functional transfer training, LE strengthening/ROM, Elevations, Therapeutic exercise, Endurance training, Patient/family training, Equipment eval/education, Bed mobility, Gait training          See flowsheet documentation for full assessment, interventions and recommendations  Note:   Prognosis: Good  Problem List: Decreased strength, Decreased endurance, Impaired balance, Decreased mobility, Decreased safety awareness, Pain  Assessment: Pt is a 62 y o  female seen for PT evaluation s/p admit to [facility] on 3/8/2020  Pt was admitted with a primary dx of: intractable low back pain  PT now consulted for assessment of mobility and d/c needs  Pt with Up with assistance orders  Pts current co morbidities and personal factors effecting treatment include: Bipolar and Major DD, tardive dyskinesia, HTN  Pts current clinical presentation is Unstable/ Unpredictable (high complexity) due to Ongoing medical management for primary dx, Increased reliance on more restrictive AD compared to baseline, Decreased activity tolerance compared to baseline, Fall risk, Increased assistance needed from caregiver at current time   Prior to admission, pt was independent with mobility, resides with son in 1 level apt with 1 SKY  Upon evaluation, pt currently is requiring Lisandro for bed mobility; modA +2 for transfers and Lisandro +2 for ambulation 2 ft with A  Pt presents at PT eval functioning below baseline and currently w/ overall mobility deficits 2* to: BLE weakness, impaired balance, decreased endurance, gait deviations, pain, decreased activity tolerance compared to baseline, decreased functional mobility tolerance compared to baseline, decreased safety awareness, fall risk, decreased cognition   Pt currently at a fall risk 2* to impairments listed above  Pt will continue to benefit from skilled acute PT interventions to address stated impairments; to maximize functional mobility; for ongoing pt/ family training; and DME needs  At conclusion of PT session pt returned BTB and bed alarm engaged with phone and call bell within reach  Pt denies any further questions at this time  Given high pain levels and instability in standing, recommend IP Rehab upon hospital D/C  Pt currently refusing, discussed +2 level of assistance required for mobility today, states she has family who will assist her, encouraged pt to speak with family, MD, and HUNTER  Reports she had a bad experience in the past at a rehab facility  Barriers to Discharge: Decreased caregiver support     Recommendation: Post acute IP rehab     PT - OK to Discharge: Yes(to IP rehab)    See flowsheet documentation for full assessment

## 2020-03-10 NOTE — UTILIZATION REVIEW
Continued Stay Review    Date: 3/10/20    OBSERVATION 3/8/20 @ 2226 - CHANGED TO INPATIENT 3/10/20 @ 0664 350 84 34    03/10/20 0729  Inpatient Admission Once     Transfer Service: General Medicine       Question Answer Comment   Admitting Physician NATANAEL ELIZONDO    Level of Care Med Surg    Estimated length of stay More than 2 Midnights    Certification I certify that inpatient services are medically necessary for this patient for a duration of greater than two midnights  See H&P and MD Progress Notes for additional information about the patient's course of treatment          03/10/20 1365     Current Patient Class: IP    Current Level of Care: MS    HPI:54 y o  female initially admitted on     Assessment/Plan:     Pertinent Labs/Diagnostic Results:   Results from last 7 days   Lab Units 03/10/20  0606 03/09/20  0555 03/09/20  0213 03/08/20  2129   WBC Thousand/uL 2 79* 4 99  --  5 56   HEMOGLOBIN g/dL 12 2 14 2  --  14 6   HEMATOCRIT % 37 3 43 1  --  43 3   PLATELETS Thousands/uL 172 219 238 243   NEUTROS ABS Thousands/µL 1 20*  --   --  4 75     Results from last 7 days   Lab Units 03/10/20  0606 03/09/20  1551 03/09/20  0555 03/08/20 2129   SODIUM mmol/L 139 138 136 137   POTASSIUM mmol/L 3 5 4 1 2 9* 3 1*   CHLORIDE mmol/L 110* 109* 100 100   CO2 mmol/L 24 27 29 28   ANION GAP mmol/L 5 2* 7 9   BUN mg/dL 6 7 9 10   CREATININE mg/dL 0 56* 0 72 0 89 0 82   EGFR ml/min/1 73sq m 121 108 84 93   CALCIUM mg/dL 8 4 8 4 8 6 8 8   MAGNESIUM mg/dL  --   --  2 5  --      Results from last 7 days   Lab Units 03/09/20  0555 03/08/20  2129   AST U/L 19 14   ALT U/L 16 12   ALK PHOS U/L 120* 131*   TOTAL PROTEIN g/dL 7 4 7 6   ALBUMIN g/dL 3 6 3 8   TOTAL BILIRUBIN mg/dL 1 40* 1 49*     Results from last 7 days   Lab Units 03/10/20  0606 03/09/20  1551 03/09/20  0555 03/08/20 2129   GLUCOSE RANDOM mg/dL 97 118 121 121     Results from last 7 days   Lab Units 03/09/20  0555   TSH 3RD [facility] uIU/mL 1 590     Results from last 7 days Lab Units 03/09/20  0229   LACTIC ACID mmol/L 1 3     Results from last 7 days   Lab Units 03/08/20  2129   CRP mg/L 48 2*   SED RATE mm/hour 16     Results from last 7 days   Lab Units 03/09/20  2091   BLOOD CULTURE  Received in Microbiology Lab  Culture in Progress  Received in Microbiology Lab  Culture in Progress       Vital Signs:   03/10/20 07:43:48  97 7 °F (36 5 °C)  70  16  123/75  91  99 %     03/09/20 23:27:12  97 7 °F (36 5 °C)  66  18  121/74  90  97 %     03/09/20 15:08:12  98 3 °F (36 8 °C)  72  18  141/85  104  99 %     03/09/20 08:11:05  99 9 °F (37 7 °C)  84  20  108/67  81  95 %     03/09/20 0222  100 4 °F (38 °C)  98  19  117/73    98 %     03/09/20 0217  100 6 °F (38 1 °C)Abnormal                03/08/20 2339  100 8 °F (38 2 °C)Abnormal                03/08/20 2308  102 8 °F (39 3 °C)Abnormal   78  18  141/75    98 %  Nasal cannula   03/08/20 2105  98 4 °F (36 9 °C)  67  20  174/84Abnormal     100 %  None (Room air)   03/08/20 2045  98 4 °F (36 9 °C)  67  22  158/86    100 %  None (Room air)   03/08/20 1909  98 4 °F (36 9 °C)  74  22  193/98Abnormal     100 %  None (Room air)     Medications:   Scheduled Medications:    Medications:  amLODIPine 5 mg Oral Daily   bisacodyl 10 mg Oral Daily   busPIRone 5 mg Oral TID   cholecalciferol 1,000 Units Oral Daily   diclofenac sodium 4 g Topical 4x Daily   DULoxetine 30 mg Oral Daily   DULoxetine 60 mg Oral HS   heparin (porcine) 7,500 Units Subcutaneous Q8H [facility]   lactulose 20 g Oral BID   loratadine 10 mg Oral Daily   meloxicam 15 mg Oral Daily   oxybutynin 5 mg Oral BID   pantoprazole 20 mg Oral Early Morning   prazosin 2 mg Oral Daily   pregabalin 100 mg Oral TID   propranolol 20 mg Oral BID   QUEtiapine 300 mg Oral HS   senna-docusate sodium 1 tablet Oral Daily   traZODone 150 mg Oral HS     Continuous IV Infusions:    sodium chloride 100 mL/hr Intravenous Continuous     PRN Meds:    cyclobenzaprine 5 mg Oral TID PRN diphenhydrAMINE 25 mg Intravenous Q8H PRN   HYDROmorphone 1 mg Oral Q6H PRN   hydrOXYzine HCL 25 mg Oral Q6H PRN   LORazepam 0 5 mg Oral Q8H PRN   ondansetron 4 mg Intravenous Q6H PRN   oxyCODONE 10 mg Oral Q4H PRN   oxyCODONE 5 mg Oral Q4H PRN       Discharge Plan: TBD    Network Utilization Review Department  [email or web address]  ATTENTION: Please call with any questions or concerns to [phone] and carefully listen to the prompts so that you are directed to the right person  All voicemails are confidential   Mat Bicker all requests for admission clinical reviews, approved or denied determinations and any other requests to dedicated fax number below belonging to the campus where the patient is receiving treatment   List of dedicated fax numbers for the Facilities:  [address] DENIALS (Administrative/Medical Necessity) [phone]   [address] (Maternity/NICU/Pediatrics) [phone]   Morris Trejo [phone]   [facility] [phone]   [facility] [phone]   Derrek Russell [phone]   1205 [facility] 1525 [facility] [phone]   [facility]  [phone]   2205 [facility] [address] [facility]  2401 [facility] 1000 [address] [facility] [phone]

## 2020-03-10 NOTE — OCCUPATIONAL THERAPY NOTE
Occupational Therapy Evaluation     Patient Name: Leslie Baez  BNMPI'B Date: 3/10/2020  Problem List  Principal Problem:    Intractable low back pain  Active Problems:    Chronic pain disorder    Bipolar II disorder ([facility])    Hypertension    Major depressive disorder, recurrent episode, moderate degree (HCC)    Migraine    Tardive dyskinesia    Past Medical History  Past Medical History:   Diagnosis Date    Acid reflux     Anxiety     RESOLVED: [id number]    Arthritis     Bipolar 2 disorder (HCC)     FOLLOWS WITH PSYCHIATRIST  CONTINUE LAMOTRIGINE; RESOLVED: [id number]    Depression     Familial tremor     both hands    Fibromyalgia     LAST ASSESSED: [id number]    Hearing aid worn     left ear    [place] (hard of hearing)     left ear    Hypertension     Left-sided weakness     Lower back pain     Memory loss of unknown cause     long and short term    Migraine     Overactive bladder     Panic attack     Post traumatic stress disorder     Seasonal allergies     Stroke [facility])     questionable stroke 2009    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  CONT ASA 81 MG DAILY; RESOLVED: [id number]    Urinary incontinence     Wears dentures     partial lower / full upper    Wears glasses      Past Surgical History  Past Surgical History:   Procedure Laterality Date    BACK SURGERY       SECTION      COLONOSCOPY      RESOLVED: [id number]    EAR SURGERY      HYSTERECTOMY      MYRINGOTOMY W/ TUBES Left     NECK SURGERY  2019    AL CYSTOURETHROSCOPY N/A 2016    Procedure: CYSTOSCOPY, BOTOX INJECTION;  Surgeon: Boy Munguia MD;  [facility];  Service: Gynecology    TONSILLECTOMY     [address] [place] [address]             03/10/20 1340   Note Type   Note type Eval only   Restrictions/Precautions   Weight Bearing Precautions Per Order No   Other Precautions Cognitive; Chair Alarm; Bed Alarm; Fall Risk;Pain;Telemetry;Multiple lines   Pain Assessment   Pain Assessment King-Baker FACES   King-Baker FACES Pain Rating 6   Pain Type Chronic pain   Pain Location Back   Hospital Pain Intervention(s) Repositioned; Ambulation/increased activity; Emotional support; Environmental changes; Rest   Home Living   Type of [address] One level  (0STE)   Bathroom Accessibility   (pt denies bathroom DME)   [address]; Wheelchair-manual   Additional Comments pt reports recently living in a Transitional care (for persons with mental health issues), recent move to a first floor apartment with adult son, aunt  Pt reports moving again with daughter soon (?) pt unreliable historian, continue to clarify  Prior Function   Level of [place] Independent with ADLs and functional mobility; Needs assistance with IADLs   Lives With Family   Receives Help From Family   ADL Assistance Independent   IADLs Needs assistance   Lifestyle   Autonomy I ADL's, assistance from family with IADL's, +lanta van for transportation   Reciprocal Relationships son, aunt, family   Intrinsic Gratification acqua therapy   Psychosocial   Psychosocial (WDL) X   Patient Behaviors/Mood Anxious   ADL   Where Assessed Edge of bed   Eating Assistance 5  Supervision/Setup   Eating Deficit Setup   Grooming Assistance 5  Supervision/Setup   Grooming Deficit Setup; Wash/dry face   UB Bathing Assistance 4  Minimal Assistance   LB Bathing Assistance 3  Moderate Assistance   [address] 2  Maximal Assistance   LB Dressing Deficit Don/doff R sock; Don/doff L sock   Toileting Assistance  4  Minimal Assistance   Toileting Deficit Steadying   Bed Mobility   Supine to Sit 4  Minimal assistance   Additional items Assist x 1   Sit to Supine 4  Minimal assistance   Additional items Assist x 1   Transfers   Sit to Stand 3  Moderate assistance   Additional items Assist x 2   Stand to Sit 3  Moderate assistance   Additional items Assist x 2;Verbal cues  (verbal cues for safety)   Additional Comments pt fluctuating between assist of 1 and 2 with transfers/mobility  Functional Mobility   Functional Mobility 3  Moderate assistance   Additional Comments mod a x 2, 2 small lateral steps, unable to ambulate further 2* to back pain  Balance   Static Sitting Good   Dynamic Sitting Fair +   Static Standing Fair -   Dynamic Standing Poor +   Ambulatory Poor   Activity Tolerance   Activity Tolerance Patient limited by fatigue;Patient limited by pain   Medical Staff Made Aware PT   Nurse Made Aware RN cleared pt for therapy   RUE Assessment   RUE Assessment WFL   LUE Assessment   LUE Assessment WFL   Hand Function   Gross Motor Coordination Functional   Fine Motor Coordination Functional   Cognition   Overall Cognitive Status Impaired   Arousal/Participation Alert; Responsive   Attention Difficulty attending to directions   Orientation Level Oriented X4   Memory Decreased recall of precautions;Decreased recall of recent events  (unreliable historian)   Following Commands Follows one step commands with increased time or repetition   Comments pt mumbled speech, disorganized though pattern/difficult following pt's converations, unreliable historian, verbal cues for encouragement with mobility, self limiting behaviors (?)   Assessment   Limitation Decreased ADL status; Decreased Safe judgement during ADL;Decreased cognition;Decreased endurance;Decreased self-care trans;Decreased high-level ADLs   Prognosis Fair   Assessment Pt is a 62 y o  female who was admitted to [facility] on 3/8/2020 with Intractable low back pain, tardive dyskinesia, CT spine negative for acute abnormalities, mild fever, migraine, bipolar, HTN, chronic pain disorder, major depressive disorder   Pt's problem list also includes PMH of HTN left sided weakness, migraine, overactive bladder, painc attacks, anxiety, acid reflux    At baseline pt was completing I ADLs assistance with IADL's  Pt lives with family in a first floor apartment  Currently pt requires min a UB, max a LBfor overall ADLS and min/mod a x 2 without AD for functional mobility/transfers  Pt currently presents with impairments in the following categories -behavioral pattern, difficulty performing ADLS, difficulty performing IADLS , limited insight into deficits, compliance, flat affect and decreased initiation and engagement  activity tolerance, endurance, standing balance/tolerance, sitting balance/tolerance, insight, safety , judgement  and attention   These impairments, as well as pt's fatigue, pain, decreased caregiver support and risk for falls  limit pt's ability to safely engage in all baseline areas of occupation, includingbathing, dressing, toileting, functional mobility/transfers, community mobility, laundry , house maintenance, medication management, meal prep, cleaning and work/volunteer work  From [employer] standpoint, recommend STR upon D/C  OT will continue to follow to address the below stated goals  Goals   Patient Goals go back home   LTG Time Frame 10-14   Long Term Goal #1 see goals below   Plan   Treatment Interventions ADL retraining;Functional transfer training; Endurance training;Cognitive reorientation;Patient/family training;Equipment evaluation/education; Compensatory technique education; Activityengagement   Goal Expiration Date 03/24/20   OT Frequency 3-5x/wk   Recommendation   OT Discharge Recommendation Short Term Rehab   OT - OK to Discharge   (yes to STR, no to home)   Barthel Index   Feeding 10   Bathing 0   Grooming Score 0   Dressing Score 5   Bladder Score 10   Bowels Score 10   Toilet Use Score 5   Transfers (Bed/Chair) Score 5   Mobility (Level Surface) Score 0   Stairs Score 0   Barthel Index Score 45   Modified Willard Scale   Modified Willard Scale 4      Occupational Therapy Goals:    *Mod I with bed mobility to engage in functional tasks    *Mod I Adl's after setup with use of AE PRN  *Mod I toileting and clothing management   *Mod I functional mobility and transfers to/from all surfaces with Fair + dynamic balance and safety for participation in dynamic adls and iadl tasks   *Demonstrate good carryover with safe use of RW during functional tasks   *Assess DME needs   *Increase activity tolerance to 25-30 minutes for participation in adls and enjoyable activities    Christos Parra MOT, OTR/L

## 2020-03-10 NOTE — PHYSICAL THERAPY NOTE
Physical Therapy Evaluation    Patient's Name: Samantha Gutierrez    Admitting Diagnosis  Back pain [M54 9]    Problem List  Patient Active Problem List   Diagnosis    Chronic bilateral low back pain with bilateral sciatica    Right knee pain    Chronic pain disorder    Lumbar radiculopathy    Lumbar spondylosis    Fibromyalgia    Lumbar disc disease with radiculopathy    Spinal stenosis of lumbar region with neurogenic claudication    Anxiety    Pain in joint, shoulder region    Intractable low back pain    Bilateral hip pain    Bipolar II disorder ([facility] [facility] )    Cognitive disorder    Esophageal reflux    Fatigue    Female pelvic pain    Generalized anxiety disorder    Hypertension    Hypokalemia    Insomnia    Major depressive disorder, recurrent episode, moderate degree (HCC)    Migraine    Opioid dependence (HCC)    Osteoarthritis of both hips    Osteoarthritis of knee    Overactive bladder    Pain, joint, hip    Panic disorder without agoraphobia    Post traumatic stress disorder    Primary localized osteoarthritis of both knees    Recent unexplained weight loss    Sacroiliitis (HCC)    Tremor    Urinary incontinence    Vitamin D deficiency    Post laminectomy syndrome    Encounter for long-term use of opiate analgesic    Family history of colorectal cancer    Morbid obesity with BMI of 40 0-44 9, adult ([facility] )    Complaint related to dreams    MIMI (obstructive sleep apnea)    Tardive dyskinesia       Past Medical History  Past Medical History:   Diagnosis Date    Acid reflux     Anxiety     RESOLVED: [id number]    Arthritis     Bipolar 2 disorder (HCC)     FOLLOWS WITH PSYCHIATRIST   CONTINUE LAMOTRIGINE; RESOLVED: [id number]    Depression     Familial tremor     both hands    Fibromyalgia     LAST ASSESSED: [id number]    Hearing aid worn     left ear    [place] (hard of hearing)     left ear    Hypertension     Left-sided weakness     Lower back pain     Memory loss of unknown cause     long and short term    Migraine     Overactive bladder     Panic attack     Post traumatic stress disorder     Seasonal allergies     Stroke [facility])     questionable stroke 2009    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  CONT ASA 81 MG DAILY; RESOLVED: [id number]    Urinary incontinence     Wears dentures     partial lower / full upper    Wears glasses        Past Surgical History  Past Surgical History:   Procedure Laterality Date    BACK SURGERY       SECTION      COLONOSCOPY      RESOLVED: [id number]    EAR SURGERY      HYSTERECTOMY      MYRINGOTOMY W/ TUBES Left     NECK SURGERY  2019    [place] CYSTOURETHROSCOPY N/A 2016    Procedure: CYSTOSCOPY, BOTOX INJECTION;  Surgeon: Jhonny Saravia MD;  [facility];  Service: Gynecology    TONSILLECTOMY     [address]        03/10/20 1454   Note Type   Note type Eval only   Pain Assessment   Pain Assessment 0-10   Pain Score Worst Possible Pain   Pain Type Chronic pain   Pain Location Back   Pain Orientation Bilateral   Hospital Pain Intervention(s) Repositioned; Ambulation/increased activity   Response to Interventions tolerated   Home Living   Type of [address] [facility] One level  (1 SKY)   Home Equipment Walker; Wheelchair-manual  (rollator, W/C )   Additional Comments Pt reports living in transitional care, recently moved into apartment with family (son, aunt), plans to move into daughter's new home soon   Prior Function   Level of [place] Independent with ADLs and functional mobility   Lives With [employer] Help From Family   ADL Assistance Independent   IADLs Needs assistance   Comments Pt reports using transportation service to appointments, uses rollator, was able to perform cleaning until recently    Restrictions/Precautions   Weight Bearing Precautions Per Order No   Other Precautions Cognitive; Chair Alarm; Bed Alarm;Pain; Fall Risk   General Family/Caregiver Present No   Cognition   Overall Cognitive Status Impaired   Arousal/Participation Alert   Orientation Level Oriented X4   Memory Within functional limits   Following Commands Follows one step commands with increased time or repetition   Comments Pt with complicated PLOF information, stating "I'm moving soon so I don't want to give too much information "    RLE Assessment   RLE Assessment   (Grossly 3/5 )   LLE Assessment   LLE Assessment   (Grossly 3/5 )   Light Touch   RLE Light Touch Grossly intact   LLE Light Touch Grossly intact   Bed Mobility   Supine to Sit 4  Minimal assistance   Additional items Assist x 1   Sit to Supine 4  Minimal assistance   Additional items LE management;Assist x 1   Additional Comments Reports unable to lay flat secondary to pain  Transfers   Sit to Stand 3  Moderate assistance   Additional items Assist x 2; Increased time required;Verbal cues   Stand to Sit 4  Minimal assistance   Additional items Assist x 2; Increased time required   Additional Comments Pt required fluctuating levels of assistance, initial trial unable to stand with +1 assist secondary to pain  Second trial required modA +2, attemped ambulation but unsafe, forward flexed posture, poor LE control  Final trials improved, Lisandro +2 for transfer to bedside commode, cues for positioning prior to siting, pt refused use of RW stating it increases her pain  Once returned to bed, asked pt to scoot toward [facility], pt able to stand with Lisandro +2 and take lateral steps toward HOB  Overall very limited secondary to pain, inconsistent performance    Ambulation/Elevation   Gait pattern Redundant gait at times; Inconsistent zack; Excessively slow; Step to; Wide CALLY; Decreased foot clearance   Gait Assistance 4  Minimal assist   Additional items Assist x 2   Assistive Device None  (HHA)   Distance 2 ft   Balance   Static Sitting Good   Dynamic Sitting Fair +   Static Standing Fair -   Dynamic Standing Poor + Ambulatory Poor +   Endurance Deficit   Endurance Deficit Yes   Activity Tolerance   Activity Tolerance Patient limited by pain   Medical Staff Made Aware OT, [address] communication with RN before and after PT session, made aware pt returned to supine with bed alarm intact   Assessment   Prognosis Good   Problem List Decreased strength;Decreased endurance; Impaired balance;Decreased mobility; Decreased safety awareness;Pain   Assessment Pt is a 62 y o  female seen for PT evaluation s/p admit to [facility] on 3/8/2020  Pt was admitted with a primary dx of: intractable low back pain  PT now consulted for assessment of mobility and d/c needs  Pt with Up with assistance orders  Pts current co morbidities and personal factors effecting treatment include: Bipolar and Major DD, tardive dyskinesia, HTN  Pts current clinical presentation is Unstable/ Unpredictable (high complexity) due to Ongoing medical management for primary dx, Increased reliance on more restrictive AD compared to baseline, Decreased activity tolerance compared to baseline, Fall risk, Increased assistance needed from caregiver at current time   Prior to admission, pt was independent with mobility, resides with son in 1 level apt with 1 SKY  Upon evaluation, pt currently is requiring Lisandro for bed mobility; modA +2 for transfers and Lisandro +2 for ambulation 2 ft with HHA  Pt presents at PT eval functioning below baseline and currently w/ overall mobility deficits 2* to: BLE weakness, impaired balance, decreased endurance, gait deviations, pain, decreased activity tolerance compared to baseline, decreased functional mobility tolerance compared to baseline, decreased safety awareness, fall risk, decreased cognition  Pt currently at a fall risk 2* to impairments listed above    Pt will continue to benefit from skilled acute PT interventions to address stated impairments; to maximize functional mobility; for ongoing pt/ family training; and DME needs  At conclusion of PT session pt returned BTB and bed alarm engaged with phone and call bell within reach  Pt denies any further questions at this time  Given high pain levels and instability in standing, recommend IP Rehab upon hospital D/C  Pt currently refusing, discussed +2 level of assistance required for mobility today, states she has family who will assist her, encouraged pt to speak with family, MD, and CM  Reports she had a bad experience in the past at a rehab facility  Barriers to Discharge Decreased caregiver support   Goals   Patient Goals "to go home"   STG Expiration Date 03/20/20   Short Term Goal #1 In 10 days pt will be able to: 1  Demonstrate ability to perform all aspects of bed mobility indepenently to increase functional independence  2  Perform functional transfers independently with RW to facilitate safe return to previous living environment  3   Ambulate 100 ft with RW and supervision with stable vitals to improve safety with household distances and reduce fall risk  4  Climb 1 steps with supervision to simulate entrance to home  5  Improve LE strength grades by 1 to increase ease of functional mobility with transfers and gait  6  Pt will demonstrate improved balance by one grade order to decrease risk of falls  PT Treatment Day 0   Plan   Treatment/Interventions Functional transfer training;LE strengthening/ROM; Elevations; Therapeutic exercise; Endurance training;Patient/family training;Equipment eval/education; Bed mobility;Gait training   PT Frequency Other (Comment)  (3-5x/week)   Recommendation   Recommendation Post acute IP rehab   PT - OK to Discharge Yes  (to IP rehab)   Barthel Index   Feeding 10   Bathing 0   Grooming Score 5   Dressing Score 5   Bladder Score 10   Bowels Score 10   Toilet Use Score 5   Transfers (Bed/Chair) Score 5   Mobility (Level Surface) Score 0   Stairs Score 0   Barthel Index Score 50       Hema Warren, PT, DPT

## 2020-03-10 NOTE — UTILIZATION REVIEW
Notification of Inpatient Admission/Inpatient Authorization Request   This is a Notification of Inpatient Admission for [address]  Be advised that this patient was admitted to our facility under Inpatient Status  Contact Davidson Escobedo at [phone] for additional admission information  [facility] DEPT  DEDICATED -[phone]  Patient Name:   Lynn Palm   YOB: 1965       [address]   [address]:   [place] [address]  Tax ID: [id number]  NPI: [id number] Attending Provider/NPI: Hanna Billingsley Md [[id number]]   Attending Physician:  Specialty- Internal Medicine  [facility] ID- [id number]  [facility], [address] [place] [address]  Phone 1: [phone]  Fax: [phone]     Place of Service Code: 24     [facility]   Start Date: 3/10/20 9144     Discharge Date & Time: No discharge date for patient encounter  Type of Admission: Inpatient Status Discharge Disposition (if discharged): Home/Self Care   Patient Diagnoses: Back pain [M54 9]     Orders: Admission Orders (From admission, onward)     Ordered        03/10/20 0728  Inpatient Admission  Once         03/08/20 2226  Place in Observation (expected length of stay for this patient is less than two midnights)  Once                    Assigned Utilization Review Contact: Davidson Escobedo  Utilization   Network Utilization Review Department  Phone: [phone]; Fax [phone]  Email: [email or web address]   ATTENTION PAYERS: Please call the assigned Utilization  directly with any questions or concerns ALL voicemails in the department are confidential  Send all requests for admission clinical reviews, approved or denied determinations and any other requests to dedicated fax number belonging to the campus where the patient is receiving treatment

## 2020-03-10 NOTE — PROGRESS NOTES
INTERNAL MEDICINE RESIDENCY PROGRESS NOTE     Name: Abraham Alpers   Age & Sex: 62 y o  female   MRN: [id number]  Unit/Bed#: [address] 822-01   Encounter: [id number]  Team: SOD Team A    PATIENT INFORMATION     Name: Abraham Alpers   Age & Sex: 62 y o  female   MRN: [id number]  Hospital Stay Days: 0    ASSESSMENT/PLAN     Principal Problem:    Intractable low back pain  Active Problems:    Chronic pain disorder    Major depressive disorder, recurrent episode, moderate degree ([facility])    Bipolar II disorder ([facility])    Hypertension    Migraine    Tardive dyskinesia      * Intractable low back pain  Assessment & Plan  History of chronic low back pain and bilateral lower extremity pain  Patient came in today for worsening of her pain  Labs significant for CRP for 48 2, sed rate 16, alk phos 131  CT scan Lucency surrounding the superior pedicle screw on the right at T7 with paraspinal soft tissue thickening and endplate erosion   Findings suggest loosening and possible infection   Mild irregularity of endplates is seen at D7-2 and T7-8   The spinal canal is not well evaluated this level due to streak artifact versus any canal narrowing is not excluded  Lucency surrounds the left S1 pedicle screw which also suggests possible loosening and degenerative changes at L5-S1  Plan:  · Will order MRI spine since CT scan study was limited due to patient moving  · Will keep patient NPO in case of any surgical intervention needed tomorrow  · Pain control:  Oxycodone 5 mg Q 4 p r n  for moderate pain, oxycodone 10 mg Q 4 p r n  For severe pain and hydromorphone 1 mg q 6 p r n  For breakthrough pain  · Will hold on to Tylenol for now since patient is spiking some mild fever  Will evaluate patient again for possible septic workup  · Patient has extensive medication list   Call her pharmacy for medication reconciliation    Patient does not know her home medication list     Major depressive disorder, recurrent episode, moderate degree [facility])  Assessment & Plan  Will continue Cymbalta 90 mg total for history of depression  Will hold on to risperidone and quetiapine since patient has severe tardive dyskinesia on physical exam     Chronic pain disorder  Assessment & Plan  Patient with long history of chronic back pain  Follows up with Neurosurgery outpatient  Patient recently evaluated by neurosurgery for possible spinal stimulator  Admits to trying Lyrica, ibuprofen, Tylenol, Cymbalta and was recently prescribed morphine 15 mg b i d  (60 tablets total) by her PCP  Patient admits that none of the pain medications have helped her with her pain so far  Mild feverresolved as of 3/10/2020  Assessment & Plan  With fever of 102 8  Repeat temperature 100 6°  Down trending blood pressure but no episodes of hypotension  Patient denied any URI symptoms or urinary symptoms  Unclear source at this time  Blood  Cultures negative at 24 hours  Plan:  · Continue to monitor fever and WBC daily    Tardive dyskinesia  Assessment & Plan  Patient currently on multiple psychiatric medications  Medication reconciliation is limited because patient does not know which medications she is taking  Patient was found to have tardive dyskinesia on physical exam   Will hold Seroquel, Klonopin and Lamictal   Call her pharmacy before initiating any medications  Migraine  Assessment & Plan  Admits to having her typical migraine headaches on presentation  Will continue home dose of Propanolol 20 mg b i d  Will give IV magnesium and Toradol 15 mg b i d  Hypertension  Assessment & Plan  Will continue amlodipine 10 mg daily  Bipolar II disorder [facility])  Assessment & Plan  Call pharmacy before initiating any psychiatric medication  Patient needs close outpatient psychiatric follow-up  Will hold on to Klonopin, Lamictal, Seroquel, risperidone  Disposition: After medically cleared; will require STR      SUBJECTIVE     Patient seen and examined   No acute events overnight  Patient lying comfortably in bed  She does not appear to be in any acute distress  She complains of back and knee pain  Patient was able to move very well despite describing severe, debilitating pain especially when reaching for her medications which were on bed side table  She denies any abdominal pain, chest pain, shortness of breath  OBJECTIVE     Vitals:    20 0811 20 1508 20 2327 03/10/20 0743   BP: 108/67 141/85 121/74 123/75   Pulse: 84 72 66 70   Resp: 20 18 18 16   Temp: 99 9 °F (37 7 °C) 98 3 °F (36 8 °C) 97 7 °F (36 5 °C) 97 7 °F (36 5 °C)   TempSrc:       SpO2: 95% 99% 97% 99%   Weight:       Height:          Temperature:   Temp (24hrs), Av 9 °F (36 6 °C), Min:97 7 °F (36 5 °C), Max:98 3 °F (36 8 °C)    Temperature: 97 7 °F (36 5 °C)  Intake & Output:  I/O        07 -  0700  07 - 03/10 0700 03/10 07 -  0700    P  O   1205 180    I V  (mL/kg)  2223 8 (23 7)     IV Piggyback 50      Total Intake(mL/kg) 50 (0 5) 3428 8 (36 6) 180 (1 9)    Urine (mL/kg/hr) 850 800 (0 4)     Stool  0     Total Output 850 800     Net -800 +2628 8 +180           Unmeasured Urine Occurrence  2 x     Unmeasured Stool Occurrence  2 x         Weights:   IBW: 45 5 kg    Body mass index is 40 39 kg/m²  Weight (last 2 days)     Date/Time   Weight    20 2308   93 8 (206 79)            Physical Exam   Constitutional: She is oriented to person, place, and time  She appears well-developed and well-nourished  Obese   HENT:   Head: Normocephalic and atraumatic  Eyes: Conjunctivae are normal  No scleral icterus  Neck: Normal range of motion  Neck supple  Cardiovascular: Normal rate, regular rhythm and normal heart sounds  Pulmonary/Chest: Effort normal and breath sounds normal    Abdominal: Soft  Bowel sounds are normal  She exhibits distension  There is no tenderness  There is no guarding  Musculoskeletal: She exhibits no tenderness or deformity  Patient complains of tenderness throughout palpation of midline back and paraspinal muscles  However did not palpate any tense musculature    Neurological: She is alert and oriented to person, place, and time  No cranial nerve deficit or sensory deficit  She exhibits normal muscle tone  Nursing note and vitals reviewed  LABORATORY DATA     Labs: I have personally reviewed pertinent reports  Results from last 7 days   Lab Units 03/10/20  0606 03/09/20  0555 03/09/20  0213 03/08/20  2129   WBC Thousand/uL 2 79* 4 99  --  5 56   HEMOGLOBIN g/dL 12 2 14 2  --  14 6   HEMATOCRIT % 37 3 43 1  --  43 3   PLATELETS Thousands/uL 172 219 238 243   NEUTROS PCT % 43  --   --  86*   MONOS PCT % 16*  --   --  7      Results from last 7 days   Lab Units 03/10/20  0606 03/09/20  1551 03/09/20  0555 03/08/20  2129   POTASSIUM mmol/L 3 5 4 1 2 9* 3 1*   CHLORIDE mmol/L 110* 109* 100 100   CO2 mmol/L 24 27 29 28   BUN mg/dL 6 7 9 10   CREATININE mg/dL 0 56* 0 72 0 89 0 82   CALCIUM mg/dL 8 4 8 4 8 6 8 8   ALK PHOS U/L  --   --  120* 131*   ALT U/L  --   --  16 12   AST U/L  --   --  19 14     Results from last 7 days   Lab Units 03/09/20  0555   MAGNESIUM mg/dL 2 5              Results from last 7 days   Lab Units 03/09/20  0229   LACTIC ACID mmol/L 1 3           IMAGING & DIAGNOSTIC TESTING     Radiology Results: I have personally reviewed pertinent reports  Ct Spine Thoracic Wo Contrast    Addendum Date: 3/8/2020    ADDENDUM: There is a typo in the conclusion  The 4th sentence should read "the spinal canal is not well evaluated at this level (T7) due to streak artifact; consequently, soft tissue canal narrowing is not excluded "  Clinical correlation with any neurologic signs including lower extremity weakness may be useful  Result Date: 3/8/2020  Impression: Lucency surrounding the superior pedicle screw on the right at T7 with paraspinal soft tissue thickening and endplate erosion    Findings suggest loosening and possible infection  Mild irregularity of endplates is seen at C7-4 and T7-8  The spinal canal  is not well evaluated this level due to streak artifact versus any canal narrowing is not excluded  Some endplate irregularity is seen at L5-S1 with also sclerosis which is more likely degenerative although some inflammatory component is not excluded  Lucency surrounds the left S1 pedicle screw which also suggests possible loosening  The right screw appears to pass into the right S1 foramen  Streak artifact does limit this study making assessment of canal narrowing difficult elsewhere  Possible erosion involving the right facet of L3-L4  Targeted MRI of the thoracic spine still may be useful with contrast to assess for any new marrow edema as compared to January as well as as well as any new canal involvement but may be limited due to streak artifact  Nuclear medicine study may also be useful  If prior CTs of the spine postoperatively are available that would be very helpful and an addendum could be rendered  Results were discussed with Dr Brayden Flores in the emergency room Workstation performed: [id number]     Ct Spine Lumbar Wo Contrast    Addendum Date: 3/8/2020    ADDENDUM: There is a typo in the conclusion  The 4th sentence should read "the spinal canal is not well evaluated at this level (T7) due to streak artifact; consequently, soft tissue canal narrowing is not excluded "  Clinical correlation with any neurologic signs including lower extremity weakness may be useful  Result Date: 3/8/2020  Impression: Lucency surrounding the superior pedicle screw on the right at T7 with paraspinal soft tissue thickening and endplate erosion  Findings suggest loosening and possible infection  Mild irregularity of endplates is seen at J1-0 and T7-8  The spinal canal  is not well evaluated this level due to streak artifact versus any canal narrowing is not excluded   Some endplate irregularity is seen at L5-S1 with also sclerosis which is more likely degenerative although some inflammatory component is not excluded  Lucency surrounds the left S1 pedicle screw which also suggests possible loosening  The right screw appears to pass into the right S1 foramen  Streak artifact does limit this study making assessment of canal narrowing difficult elsewhere  Possible erosion involving the right facet of L3-L4  Targeted MRI of the thoracic spine still may be useful with contrast to assess for any new marrow edema as compared to January as well as as well as any new canal involvement but may be limited due to streak artifact  Nuclear medicine study may also be useful  If prior CTs of the spine postoperatively are available that would be very helpful and an addendum could be rendered  Results were discussed with Dr Connor Mustafa in the emergency room Workstation performed: [id number]     Mri Thoracic Spine Wo Contrast    Result Date: 3/10/2020  Impression: Markedly limited exam of the thoracic spine owing to motion and ferromagnetic posterior fixation hardware  Patient is fused from  T7 to the  Solitary, right T7 screw is loose based on CT  Although discitis osteomyelitis at T6-T7 cannot be excluded with certainty, this is the transition point where one would expect significant degenerative changes at the margin between the relatively mobile spine and  the long fixed segment extending to the sacrum  Workstation performed: [id number]     Other Diagnostic Testing: I have personally reviewed pertinent reports      ACTIVE MEDICATIONS     Current Facility-Administered Medications   Medication Dose Route Frequency    amLODIPine (NORVASC) tablet 5 mg  5 mg Oral Daily    bisacodyl (DULCOLAX) EC tablet 10 mg  10 mg Oral Daily    busPIRone (BUSPAR) tablet 5 mg  5 mg Oral TID    cholecalciferol (VITAMIN D3) tablet 1,000 Units  1,000 Units Oral Daily    cyclobenzaprine (FLEXERIL) tablet 5 mg  5 mg Oral TID PRN    diclofenac sodium (VOLTAREN) 1 % topical gel 4 g  4 g Topical 4x Daily    diphenhydrAMINE (BENADRYL) injection 25 mg  25 mg Intravenous Q8H PRN    DULoxetine (CYMBALTA) delayed release capsule 30 mg  30 mg Oral Daily    DULoxetine (CYMBALTA) delayed release capsule 60 mg  60 mg Oral HS    heparin (porcine) subcutaneous injection 7,500 Units  7,500 Units Subcutaneous Q8H [address]    HYDROmorphone (DILAUDID) injection 1 mg  1 mg Intravenous Once PRN    HYDROmorphone (DILAUDID) tablet 1 mg  1 mg Oral Q6H PRN    hydrOXYzine HCL (ATARAX) tablet 25 mg  25 mg Oral Q6H PRN    lactulose 20 g/30 mL oral solution 20 g  20 g Oral BID    lidocaine (LIDODERM) 5 % patch 1 patch  1 patch Topical HS    loratadine (CLARITIN) tablet 10 mg  10 mg Oral Daily    LORazepam (ATIVAN) tablet 0 5 mg  0 5 mg Oral Q8H PRN    LORazepam (ATIVAN) tablet 1 mg  1 mg Oral Once PRN    meloxicam (MOBIC) tablet 15 mg  15 mg Oral Daily    ondansetron (ZOFRAN) injection 4 mg  4 mg Intravenous Q6H PRN    oxybutynin (DITROPAN) tablet 5 mg  5 mg Oral BID    oxyCODONE (ROXICODONE) immediate release tablet 10 mg  10 mg Oral Q4H PRN    oxyCODONE (ROXICODONE) IR tablet 5 mg  5 mg Oral Q4H PRN    pantoprazole (PROTONIX) EC tablet 20 mg  20 mg Oral Early Morning    prazosin (MINIPRESS) capsule 2 mg  2 mg Oral Daily    pregabalin (LYRICA) capsule 100 mg  100 mg Oral TID    propranolol (INDERAL) tablet 20 mg  20 mg Oral BID    QUEtiapine (SEROquel XR) 24 hr tablet 300 mg  300 mg Oral HS    senna-docusate sodium (SENOKOT S) 8 6-50 mg per tablet 1 tablet  1 tablet Oral Daily    sodium chloride 0 9 % infusion  100 mL/hr Intravenous Continuous    traZODone (DESYREL) tablet 150 mg  150 mg Oral HS       VTE Pharmacologic Prophylaxis: Heparin  VTE Mechanical Prophylaxis: sequential compression device    Portions of the record may have been created with voice recognition software    Occasional wrong word or "sound a like" substitutions may have occurred due to the inherent limitations of voice recognition software    Read the chart carefully and recognize, using context, where substitutions have occurred   ==  Mauricio Navarrete MD  [address]  Internal Medicine Residency PGY-1

## 2020-03-10 NOTE — PLAN OF CARE
Problem: OCCUPATIONAL THERAPY ADULT  Goal: Performs self-care activities at highest level of function for planned discharge setting  See evaluation for individualized goals  Description  Treatment Interventions: ADL retraining, Functional transfer training, Endurance training, Cognitive reorientation, Patient/family training, Equipment evaluation/education, Compensatory technique education, Activityengagement          See flowsheet documentation for full assessment, interventions and recommendations  Note:   Limitation: Decreased ADL status, Decreased Safe judgement during ADL, Decreased cognition, Decreased endurance, Decreased self-care trans, Decreased high-level ADLs  Prognosis: Fair  Assessment: Pt is a 62 y o  female who was [facility] to [facility] on 3/8/2020 with Intractable low back pain, tardive dyskinesia, CT spine negative for acute abnormalities, mild fever, migraine, bipolar, HTN, chronic pain disorder, major depressive disorder   Pt's problem list also includes PMH of HTN left sided weakness, migraine, overactive bladder, painc attacks, anxiety, acid reflux   At baseline pt was completing Ald's with I  Pt lives with family in a first floor apartment  Currently pt requires min a UB, max a LBfor overall ADLS and min/mod a x 2 without AD for functional mobility/transfers  Pt currently presents with impairments in the following categories -behavioral pattern, difficulty performing ADLS, difficulty performing IADLS , limited insight into deficits, compliance, flat affect and decreased initiation and engagement     These impairments, as well as pt's fatigue, pain, decreased caregiver support and risk for falls  limit pt's ability to safely engage in all baseline areas of occupation, includingbathing, dressing, toileting, functional mobility/transfers, community mobility, laundry , house maintenance, medication management, meal prep, cleaning and work/volunteer work  From [employer], recommend STR upon D/C  OT will continue to follow to address the below stated goals        OT Discharge Recommendation: Short Term Rehab  OT - OK to Discharge: (yes to STR, no to home)

## 2020-03-10 NOTE — PLAN OF CARE
Problem: Potential for Falls  Goal: Patient will remain free of falls  Description  INTERVENTIONS:  - Assess patient frequently for physical needs  -  Identify cognitive and physical deficits and behaviors that affect risk of falls    -  [place] fall precautions as indicated by assessment   - Educate patient/family on patient safety including physical limitations  - Instruct patient to call for assistance with activity based on assessment  - Modify environment to reduce risk of injury  - Consider OT/PT consult to assist with strengthening/mobility  Outcome: Progressing     Problem: Prexisting or High Potential for Compromised Skin Integrity  Goal: Skin integrity is maintained or improved  Description  INTERVENTIONS:  - Identify patients at risk for skin breakdown  - Assess and monitor skin integrity  - Assess and monitor nutrition and hydration status  - Monitor labs   - Assess for incontinence   - Turn and reposition patient  - Assist with mobility/ambulation  - Relieve pressure over bony prominences  - Avoid friction and shearing  - Provide appropriate hygiene as needed including keeping skin clean and dry  - Evaluate need for skin moisturizer/barrier cream  - Collaborate with interdisciplinary team   - Patient/family teaching  - Consider wound care consult   Outcome: Progressing     Problem: PAIN - ADULT  Goal: Verbalizes/displays adequate comfort level or baseline comfort level  Description  Interventions:  - Encourage patient to monitor pain and request assistance  - Assess pain using appropriate pain scale  - Administer analgesics based on type and severity of pain and evaluate response  - Implement non-pharmacological measures as appropriate and evaluate response  - Consider cultural and social influences on pain and pain management  - Notify physician/advanced practitioner if interventions unsuccessful or patient reports new pain  Outcome: Progressing     Problem: INFECTION - ADULT  Goal: Absence or prevention of progression during hospitalization  Description  INTERVENTIONS:  - Assess and monitor for signs and symptoms of infection  - Monitor lab/diagnostic results  - Monitor all insertion sites, i e  indwelling lines, tubes, and drains  - Monitor endotracheal if appropriate and nasal secretions for changes in amount and color  - [place] appropriate cooling/warming therapies per order  - Administer medications as ordered  - Instruct and encourage patient and family to use good hand hygiene technique  - Identify and instruct in appropriate isolation precautions for identified infection/condition  Outcome: Progressing  Goal: Absence of fever/infection during neutropenic period  Description  INTERVENTIONS:  - Monitor WBC    Outcome: Progressing     Problem: SAFETY ADULT  Goal: Patient will remain free of falls  Description  INTERVENTIONS:  - Assess patient frequently for physical needs  -  Identify cognitive and physical deficits and behaviors that affect risk of falls    -  [place] fall precautions as indicated by assessment   - Educate patient/family on patient safety including physical limitations  - Instruct patient to call for assistance with activity based on assessment  - Modify environment to reduce risk of injury  - Consider OT/PT consult to assist with strengthening/mobility  Outcome: Progressing  Goal: Maintain or return to baseline ADL function  Description  INTERVENTIONS:  -  Assess patient's ability to carry out ADLs; assess patient's baseline for ADL function and identify physical deficits which impact ability to perform ADLs (bathing, care of mouth/teeth, toileting, grooming, dressing, etc )  - Assess/evaluate cause of self-care deficits   - Assess range of motion  - Assess patient's mobility; develop plan if impaired  - Assess patient's need for assistive devices and provide as appropriate  - Encourage maximum independence but intervene and supervise when necessary  - Involve family in performance of ADLs  - Assess for home care needs following discharge   - Consider OT consult to assist with ADL evaluation and planning for discharge  - Provide patient education as appropriate  Outcome: Progressing  Goal: Maintain or return mobility status to optimal level  Description  INTERVENTIONS:  - Assess patient's baseline mobility status (ambulation, transfers, stairs, etc )    - Identify cognitive and physical deficits and behaviors that affect mobility  - Identify mobility aids required to assist with transfers and/or ambulation (gait belt, sit-to-stand, lift, walker, cane, etc )  - [place] fall precautions as indicated by assessment  - Record patient progress and toleration of activity level on Mobility SBAR; progress patient to next Phase/Stage  - Instruct patient to call for assistance with activity based on assessment  - Consider rehabilitation consult to assist with strengthening/weightbearing, etc   Outcome: Progressing     Problem: DISCHARGE PLANNING  Goal: Discharge to home or other facility with appropriate resources  Description  INTERVENTIONS:  - Identify barriers to discharge w/patient and caregiver  - Arrange for needed discharge resources and transportation as appropriate  - Identify discharge learning needs (meds, wound care, etc )  - Arrange for interpretive services to assist at discharge as needed  - Refer to Case Management Department for coordinating discharge planning if the patient needs post-hospital services based on physician/advanced practitioner order or complex needs related to functional status, cognitive ability, or social support system  Outcome: Progressing     Problem: Knowledge Deficit  Goal: Patient/family/caregiver demonstrates understanding of disease process, treatment plan, medications, and discharge instructions  Description  Complete learning assessment and assess knowledge base    Interventions:  - Provide teaching at level of understanding  - Provide teaching via preferred learning methods  Outcome: Progressing     Problem: SKIN/TISSUE INTEGRITY - ADULT  Goal: Skin integrity remains intact  Description  INTERVENTIONS  - Identify patients at risk for skin breakdown  - Assess and monitor skin integrity  - Assess and monitor nutrition and hydration status  - Monitor labs (i e  albumin)  - Assess for incontinence   - Turn and reposition patient  - Assist with mobility/ambulation  - Relieve pressure over bony prominences  - Avoid friction and shearing  - Provide appropriate hygiene as needed including keeping skin clean and dry  - Evaluate need for skin moisturizer/barrier cream  - Collaborate with interdisciplinary team (i e  Nutrition, Rehabilitation, etc )   - Patient/family teaching  Outcome: Progressing     Problem: MUSCULOSKELETAL - ADULT  Goal: Maintain or return mobility to safest level of function  Description  INTERVENTIONS:  - Assess patient's ability to carry out ADLs; assess patient's baseline for ADL function and identify physical deficits which impact ability to perform ADLs (bathing, care of mouth/teeth, toileting, grooming, dressing, etc )  - Assess/evaluate cause of self-care deficits   - Assess range of motion  - Assess patient's mobility  - Assess patient's need for assistive devices and provide as appropriate  - Encourage maximum independence but intervene and supervise when necessary  - Involve family in performance of ADLs  - Assess for home care needs following discharge   - Consider OT consult to assist with ADL evaluation and planning for discharge  - Provide patient education as appropriate  Outcome: Progressing  Goal: Maintain proper alignment of affected body part  Description  INTERVENTIONS:  - Support, maintain and protect limb and body alignment  - Provide patient/ family with appropriate education  Outcome: Progressing

## 2020-03-11 ENCOUNTER — APPOINTMENT (INPATIENT)
Dept: RADIOLOGY | Facility: HOSPITAL | Age: 55
DRG: 560 | End: 2020-03-11
Payer: COMMERCIAL

## 2020-03-11 PROCEDURE — 72158 MRI LUMBAR SPINE W/O & W/DYE: CPT

## 2020-03-11 PROCEDURE — 99232 SBSQ HOSP IP/OBS MODERATE 35: CPT | Performed by: INTERNAL MEDICINE

## 2020-03-11 RX ADMIN — DICLOFENAC 4 G: 10 GEL TOPICAL at 21:37

## 2020-03-11 RX ADMIN — HYDROMORPHONE HYDROCHLORIDE 1 MG: 2 TABLET ORAL at 17:57

## 2020-03-11 RX ADMIN — SODIUM CHLORIDE 100 ML/HR: 0.9 INJECTION, SOLUTION INTRAVENOUS at 16:47

## 2020-03-11 RX ADMIN — LACTULOSE 20 G: 10 SOLUTION ORAL at 17:56

## 2020-03-11 RX ADMIN — DULOXETINE HYDROCHLORIDE 30 MG: 30 CAPSULE, DELAYED RELEASE ORAL at 08:47

## 2020-03-11 RX ADMIN — SODIUM CHLORIDE 100 ML/HR: 0.9 INJECTION, SOLUTION INTRAVENOUS at 08:46

## 2020-03-11 RX ADMIN — LORAZEPAM 1 MG: 1 TABLET ORAL at 22:52

## 2020-03-11 RX ADMIN — DULOXETINE HYDROCHLORIDE 60 MG: 60 CAPSULE, DELAYED RELEASE ORAL at 21:36

## 2020-03-11 RX ADMIN — BUSPIRONE HYDROCHLORIDE 5 MG: 5 TABLET ORAL at 17:56

## 2020-03-11 RX ADMIN — OXYBUTYNIN CHLORIDE 5 MG: 5 TABLET ORAL at 17:56

## 2020-03-11 RX ADMIN — PREGABALIN 100 MG: 100 CAPSULE ORAL at 17:56

## 2020-03-11 RX ADMIN — QUETIAPINE FUMARATE 300 MG: 300 TABLET, EXTENDED RELEASE ORAL at 21:37

## 2020-03-11 RX ADMIN — LORATADINE 10 MG: 10 TABLET ORAL at 08:48

## 2020-03-11 RX ADMIN — OXYBUTYNIN CHLORIDE 5 MG: 5 TABLET ORAL at 08:48

## 2020-03-11 RX ADMIN — HYDROMORPHONE HYDROCHLORIDE 1 MG: 1 INJECTION, SOLUTION INTRAMUSCULAR; INTRAVENOUS; SUBCUTANEOUS at 22:55

## 2020-03-11 RX ADMIN — OXYCODONE HYDROCHLORIDE 10 MG: 10 TABLET ORAL at 01:40

## 2020-03-11 RX ADMIN — HEPARIN SODIUM 7500 UNITS: 5000 INJECTION INTRAVENOUS; SUBCUTANEOUS at 21:36

## 2020-03-11 RX ADMIN — PRAZOSIN HYDROCHLORIDE 2 MG: 2 CAPSULE ORAL at 08:49

## 2020-03-11 RX ADMIN — HEPARIN SODIUM 7500 UNITS: 5000 INJECTION INTRAVENOUS; SUBCUTANEOUS at 14:49

## 2020-03-11 RX ADMIN — DICLOFENAC 4 G: 10 GEL TOPICAL at 11:23

## 2020-03-11 RX ADMIN — PANTOPRAZOLE SODIUM 20 MG: 20 TABLET, DELAYED RELEASE ORAL at 05:13

## 2020-03-11 RX ADMIN — AMLODIPINE BESYLATE 5 MG: 5 TABLET ORAL at 08:47

## 2020-03-11 RX ADMIN — CYCLOBENZAPRINE HYDROCHLORIDE 5 MG: 10 TABLET, FILM COATED ORAL at 08:48

## 2020-03-11 RX ADMIN — MELATONIN 1000 UNITS: at 08:48

## 2020-03-11 RX ADMIN — BISACODYL 10 MG: 5 TABLET, COATED ORAL at 08:47

## 2020-03-11 RX ADMIN — CYCLOBENZAPRINE HYDROCHLORIDE 5 MG: 10 TABLET, FILM COATED ORAL at 21:36

## 2020-03-11 RX ADMIN — PROPRANOLOL HYDROCHLORIDE 20 MG: 20 TABLET ORAL at 08:48

## 2020-03-11 RX ADMIN — DICLOFENAC 4 G: 10 GEL TOPICAL at 08:48

## 2020-03-11 RX ADMIN — BUSPIRONE HYDROCHLORIDE 5 MG: 5 TABLET ORAL at 08:47

## 2020-03-11 RX ADMIN — LORAZEPAM 0.5 MG: 0.5 TABLET ORAL at 08:47

## 2020-03-11 RX ADMIN — MELOXICAM 15 MG: 7.5 TABLET ORAL at 08:50

## 2020-03-11 RX ADMIN — PREGABALIN 100 MG: 100 CAPSULE ORAL at 08:48

## 2020-03-11 RX ADMIN — PROPRANOLOL HYDROCHLORIDE 20 MG: 20 TABLET ORAL at 17:57

## 2020-03-11 RX ADMIN — DICLOFENAC 4 G: 10 GEL TOPICAL at 17:57

## 2020-03-11 RX ADMIN — TRAZODONE HYDROCHLORIDE 150 MG: 100 TABLET ORAL at 21:36

## 2020-03-11 RX ADMIN — LIDOCAINE 1 PATCH: 50 PATCH TOPICAL at 21:35

## 2020-03-11 RX ADMIN — DIPHENHYDRAMINE HYDROCHLORIDE 25 MG: 50 INJECTION, SOLUTION INTRAMUSCULAR; INTRAVENOUS at 21:52

## 2020-03-11 RX ADMIN — PREGABALIN 100 MG: 100 CAPSULE ORAL at 21:36

## 2020-03-11 RX ADMIN — DIPHENHYDRAMINE HYDROCHLORIDE 25 MG: 50 INJECTION, SOLUTION INTRAMUSCULAR; INTRAVENOUS at 01:40

## 2020-03-11 RX ADMIN — LACTULOSE 20 G: 10 SOLUTION ORAL at 08:48

## 2020-03-11 RX ADMIN — OXYCODONE HYDROCHLORIDE 10 MG: 10 TABLET ORAL at 14:50

## 2020-03-11 RX ADMIN — SENNOSIDES AND DOCUSATE SODIUM 1 TABLET: 8.6; 5 TABLET ORAL at 08:47

## 2020-03-11 RX ADMIN — HEPARIN SODIUM 7500 UNITS: 5000 INJECTION INTRAVENOUS; SUBCUTANEOUS at 05:13

## 2020-03-11 RX ADMIN — BUSPIRONE HYDROCHLORIDE 5 MG: 5 TABLET ORAL at 21:36

## 2020-03-11 RX ADMIN — OXYCODONE HYDROCHLORIDE 10 MG: 10 TABLET ORAL at 08:47

## 2020-03-11 NOTE — PLAN OF CARE
influences on pain and pain management  - Notify physician/advanced practitioner if interventions unsuccessful or patient reports new pain  3/11/2020 0742 by Sharda Blanchard RN  Outcome: Progressing  3/11/2020 0742 by Sharda Blanchard RN  Outcome: Progressing     Problem: INFECTION - ADULT  Goal: Absence or prevention of progression during hospitalization  Description  INTERVENTIONS:  - Assess and monitor for signs and symptoms of infection  - Monitor lab/diagnostic results  - Monitor all insertion sites, i e  indwelling lines, tubes, and drains  - Monitor endotracheal if appropriate and nasal secretions for changes in amount and color  - [place] appropriate cooling/warming therapies per order  - Administer medications as ordered  - Instruct and encourage patient and family to use good hand hygiene technique  - Identify and instruct in appropriate isolation precautions for identified infection/condition  3/11/2020 0742 by Sharda Blanchard RN  Outcome: Progressing  3/11/2020 0742 by Sharda Blanchard RN  Outcome: Progressing  Goal: Absence of fever/infection during neutropenic period  Description  INTERVENTIONS:  - Monitor WBC    3/11/2020 0742 by Sharda Blanchard RN  Outcome: Progressing  3/11/2020 0742 by Sharda Blanchard RN  Outcome: Progressing     Problem: SAFETY ADULT  Goal: Patient will remain free of falls  Description  INTERVENTIONS:  - Assess patient frequently for physical needs  -  Identify cognitive and physical deficits and behaviors that affect risk of falls    -  [place] fall precautions as indicated by assessment   - Educate patient/family on patient safety including physical limitations  - Instruct patient to call for assistance with activity based on assessment  - Modify environment to reduce risk of injury  - Consider OT/PT consult to assist with strengthening/mobility  3/11/2020 0742 by Sharda Blanchard RN  Outcome: Progressing  3/11/2020 0742 by Sharda Blanchard RN  Outcome: Progressing  Goal: Maintain or return to baseline ADL function  Description  INTERVENTIONS:  -  Assess patient's ability to carry out ADLs; assess patient's baseline for ADL function and identify physical deficits which impact ability to perform ADLs (bathing, care of mouth/teeth, toileting, grooming, dressing, etc )  - Assess/evaluate cause of self-care deficits   - Assess range of motion  - Assess patient's mobility; develop plan if impaired  - Assess patient's need for assistive devices and provide as appropriate  - Encourage maximum independence but intervene and supervise when necessary  - Involve family in performance of ADLs  - Assess for home care needs following discharge   - Consider OT consult to assist with ADL evaluation and planning for discharge  - Provide patient education as appropriate  3/11/2020 0742 by Marcial Askew RN  Outcome: Progressing  3/11/2020 0742 by Marcial Askew RN  Outcome: Progressing  Goal: Maintain or return mobility status to optimal level  Description  INTERVENTIONS:  - Assess patient's baseline mobility status (ambulation, transfers, stairs, etc )    - Identify cognitive and physical deficits and behaviors that affect mobility  - Identify mobility aids required to assist with transfers and/or ambulation (gait belt, sit-to-stand, lift, walker, cane, etc )  - [place] fall precautions as indicated by assessment  - Record patient progress and toleration of activity level on Mobility SBAR; progress patient to next Phase/Stage  - Instruct patient to call for assistance with activity based on assessment  - Consider rehabilitation consult to assist with strengthening/weightbearing, etc   3/11/2020 0742 by Marcial Askew RN  Outcome: Progressing  3/11/2020 0742 by Marcial Askew RN  Outcome: Progressing     Problem: DISCHARGE PLANNING  Goal: Discharge to home or other facility with appropriate resources  Description  INTERVENTIONS:  - Identify barriers to discharge w/patient and caregiver  - Arrange for needed discharge resources and transportation as appropriate  - Identify discharge learning needs (meds, wound care, etc )  - Arrange for interpretive services to assist at discharge as needed  - Refer to Case Management Department for coordinating discharge planning if the patient needs post-hospital services based on physician/advanced practitioner order or complex needs related to functional status, cognitive ability, or social support system  3/11/2020 0742 by Majo Clark RN  Outcome: Progressing  3/11/2020 0742 by Majo Clark RN  Outcome: Progressing     Problem: Knowledge Deficit  Goal: Patient/family/caregiver demonstrates understanding of disease process, treatment plan, medications, and discharge instructions  Description  Complete learning assessment and assess knowledge base    Interventions:  - Provide teaching at level of understanding  - Provide teaching via preferred learning methods  3/11/2020 0742 by Majo Clark RN  Outcome: Progressing  3/11/2020 0742 by Majo Clark RN  Outcome: Progressing     Problem: SKIN/TISSUE INTEGRITY - ADULT  Goal: Skin integrity remains intact  Description  INTERVENTIONS  - Identify patients at risk for skin breakdown  - Assess and monitor skin integrity  - Assess and monitor nutrition and hydration status  - Monitor labs (i e  albumin)  - Assess for incontinence   - Turn and reposition patient  - Assist with mobility/ambulation  - Relieve pressure over bony prominences  - Avoid friction and shearing  - Provide appropriate hygiene as needed including keeping skin clean and dry  - Evaluate need for skin moisturizer/barrier cream  - Collaborate with interdisciplinary team (i e  Nutrition, Rehabilitation, etc )   - Patient/family teaching  3/11/2020 5281 by Majo Clark RN  Outcome: Progressing  3/11/2020 0742 by Majo Clark RN  Outcome: Progressing     Problem: MUSCULOSKELETAL - ADULT  Goal: Maintain or return mobility to safest level of function  Description  INTERVENTIONS:  - Assess patient's ability to carry out ADLs; assess patient's baseline for ADL function and identify physical deficits which impact ability to perform ADLs (bathing, care of mouth/teeth, toileting, grooming, dressing, etc )  - Assess/evaluate cause of self-care deficits   - Assess range of motion  - Assess patient's mobility  - Assess patient's need for assistive devices and provide as appropriate  - Encourage maximum independence but intervene and supervise when necessary  - Involve family in performance of ADLs  - Assess for home care needs following discharge   - Consider OT consult to assist with ADL evaluation and planning for discharge  - Provide patient education as appropriate  3/11/2020 0742 by Rissa Garza RN  Outcome: Progressing  3/11/2020 0742 by Rissa Garza RN  Outcome: Progressing  Goal: Maintain proper alignment of affected body part  Description  INTERVENTIONS:  - Support, maintain and protect limb and body alignment  - Provide patient/ family with appropriate education  3/11/2020 0742 by Rissa Garza, RN  Outcome: Progressing  3/11/2020 0742 by Rissa Garza, RN  Outcome: Progressing

## 2020-03-11 NOTE — PLAN OF CARE
Problem: Potential for Falls  Goal: Patient will remain free of falls  Description  INTERVENTIONS:  - Assess patient frequently for physical needs  -  Identify cognitive and physical deficits and behaviors that affect risk of falls    -  [place] fall precautions as indicated by assessment   - Educate patient/family on patient safety including physical limitations  - Instruct patient to call for assistance with activity based on assessment  - Modify environment to reduce risk of injury  - Consider OT/PT consult to assist with strengthening/mobility  Outcome: Progressing     Problem: Prexisting or High Potential for Compromised Skin Integrity  Goal: Skin integrity is maintained or improved  Description  INTERVENTIONS:  - Identify patients at risk for skin breakdown  - Assess and monitor skin integrity  - Assess and monitor nutrition and hydration status  - Monitor labs   - Assess for incontinence   - Turn and reposition patient  - Assist with mobility/ambulation  - Relieve pressure over bony prominences  - Avoid friction and shearing  - Provide appropriate hygiene as needed including keeping skin clean and dry  - Evaluate need for skin moisturizer/barrier cream  - Collaborate with interdisciplinary team   - Patient/family teaching  - Consider wound care consult   Outcome: Progressing     Problem: PAIN - ADULT  Goal: Verbalizes/displays adequate comfort level or baseline comfort level  Description  Interventions:  - Encourage patient to monitor pain and request assistance  - Assess pain using appropriate pain scale  - Administer analgesics based on type and severity of pain and evaluate response  - Implement non-pharmacological measures as appropriate and evaluate response  - Consider cultural and social influences on pain and pain management  - Notify physician/advanced practitioner if interventions unsuccessful or patient reports new pain  Outcome: Progressing     Problem: INFECTION - ADULT  Goal: Absence or prevention of progression during hospitalization  Description  INTERVENTIONS:  - Assess and monitor for signs and symptoms of infection  - Monitor lab/diagnostic results  - Monitor all insertion sites, i e  indwelling lines, tubes, and drains  - Monitor endotracheal if appropriate and nasal secretions for changes in amount and color  - [place] appropriate cooling/warming therapies per order  - Administer medications as ordered  - Instruct and encourage patient and family to use good hand hygiene technique  - Identify and instruct in appropriate isolation precautions for identified infection/condition  Outcome: Progressing  Goal: Absence of fever/infection during neutropenic period  Description  INTERVENTIONS:  - Monitor WBC    Outcome: Progressing     Problem: SAFETY ADULT  Goal: Patient will remain free of falls  Description  INTERVENTIONS:  - Assess patient frequently for physical needs  -  Identify cognitive and physical deficits and behaviors that affect risk of falls    -  [place] fall precautions as indicated by assessment   - Educate patient/family on patient safety including physical limitations  - Instruct patient to call for assistance with activity based on assessment  - Modify environment to reduce risk of injury  - Consider OT/PT consult to assist with strengthening/mobility  Outcome: Progressing  Goal: Maintain or return to baseline ADL function  Description  INTERVENTIONS:  -  Assess patient's ability to carry out ADLs; assess patient's baseline for ADL function and identify physical deficits which impact ability to perform ADLs (bathing, care of mouth/teeth, toileting, grooming, dressing, etc )  - Assess/evaluate cause of self-care deficits   - Assess range of motion  - Assess patient's mobility; develop plan if impaired  - Assess patient's need for assistive devices and provide as appropriate  - Encourage maximum independence but intervene and supervise when necessary  - Involve family in performance of ADLs  - Assess for home care needs following discharge   - Consider OT consult to assist with ADL evaluation and planning for discharge  - Provide patient education as appropriate  Outcome: Progressing  Goal: Maintain or return mobility status to optimal level  Description  INTERVENTIONS:  - Assess patient's baseline mobility status (ambulation, transfers, stairs, etc )    - Identify cognitive and physical deficits and behaviors that affect mobility  - Identify mobility aids required to assist with transfers and/or ambulation (gait belt, sit-to-stand, lift, walker, cane, etc )  - [place] fall precautions as indicated by assessment  - Record patient progress and toleration of activity level on Mobility SBAR; progress patient to next Phase/Stage  - Instruct patient to call for assistance with activity based on assessment  - Consider rehabilitation consult to assist with strengthening/weightbearing, etc   Outcome: Progressing     Problem: DISCHARGE PLANNING  Goal: Discharge to home or other facility with appropriate resources  Description  INTERVENTIONS:  - Identify barriers to discharge w/patient and caregiver  - Arrange for needed discharge resources and transportation as appropriate  - Identify discharge learning needs (meds, wound care, etc )  - Arrange for interpretive services to assist at discharge as needed  - Refer to Case Management Department for coordinating discharge planning if the patient needs post-hospital services based on physician/advanced practitioner order or complex needs related to functional status, cognitive ability, or social support system  Outcome: Progressing     Problem: Knowledge Deficit  Goal: Patient/family/caregiver demonstrates understanding of disease process, treatment plan, medications, and discharge instructions  Description  Complete learning assessment and assess knowledge base    Interventions:  - Provide teaching at level of understanding  - Provide teaching via preferred learning methods  Outcome: Progressing     Problem: SKIN/TISSUE INTEGRITY - ADULT  Goal: Skin integrity remains intact  Description  INTERVENTIONS  - Identify patients at risk for skin breakdown  - Assess and monitor skin integrity  - Assess and monitor nutrition and hydration status  - Monitor labs (i e  albumin)  - Assess for incontinence   - Turn and reposition patient  - Assist with mobility/ambulation  - Relieve pressure over bony prominences  - Avoid friction and shearing  - Provide appropriate hygiene as needed including keeping skin clean and dry  - Evaluate need for skin moisturizer/barrier cream  - Collaborate with interdisciplinary team (i e  Nutrition, Rehabilitation, etc )   - Patient/family teaching  Outcome: Progressing     Problem: MUSCULOSKELETAL - ADULT  Goal: Maintain or return mobility to safest level of function  Description  INTERVENTIONS:  - Assess patient's ability to carry out ADLs; assess patient's baseline for ADL function and identify physical deficits which impact ability to perform ADLs (bathing, care of mouth/teeth, toileting, grooming, dressing, etc )  - Assess/evaluate cause of self-care deficits   - Assess range of motion  - Assess patient's mobility  - Assess patient's need for assistive devices and provide as appropriate  - Encourage maximum independence but intervene and supervise when necessary  - Involve family in performance of ADLs  - Assess for home care needs following discharge   - Consider OT consult to assist with ADL evaluation and planning for discharge  - Provide patient education as appropriate  Outcome: Progressing  Goal: Maintain proper alignment of affected body part  Description  INTERVENTIONS:  - Support, maintain and protect limb and body alignment  - Provide patient/ family with appropriate education  Outcome: Progressing

## 2020-03-11 NOTE — SOCIAL WORK
IP rehab recs:     A post acute care recommendation was made by your care team for STR  Discussed Freedom of Choice with patient  List of facilities given to patient via in person  patient aware the list is custom filtered for them by insurance and that [facility] post acute providers are designated  Pt agreeable to IP rehab, stated she does not want to go to any [facility] facilities d/t poor care in past  Pt requested referrals to [address] [place] [address] FOR CHILDREN; [facility] referrals sent       DC Plan - IP rehab; [facility] to [place], [place]

## 2020-03-11 NOTE — PLAN OF CARE
Problem: Potential for Falls  Goal: Patient will remain free of falls  Description  INTERVENTIONS:  - Assess patient frequently for physical needs  -  Identify cognitive and physical deficits and behaviors that affect risk of falls    -  [place] fall precautions as indicated by assessment   - Educate patient/family on patient safety including physical limitations  - Instruct patient to call for assistance with activity based on assessment  - Modify environment to reduce risk of injury  - Consider OT/PT consult to assist with strengthening/mobility  Outcome: Progressing     Problem: Prexisting or High Potential for Compromised Skin Integrity  Goal: Skin integrity is maintained or improved  Description  INTERVENTIONS:  - Identify patients at risk for skin breakdown  - Assess and monitor skin integrity  - Assess and monitor nutrition and hydration status  - Monitor labs   - Assess for incontinence   - Turn and reposition patient  - Assist with mobility/ambulation  - Relieve pressure over bony prominences  - Avoid friction and shearing  - Provide appropriate hygiene as needed including keeping skin clean and dry  - Evaluate need for skin moisturizer/barrier cream  - Collaborate with interdisciplinary team   - Patient/family teaching  - Consider wound care consult   Outcome: Progressing     Problem: PAIN - ADULT  Goal: Verbalizes/displays adequate comfort level or baseline comfort level  Description  Interventions:  - Encourage patient to monitor pain and request assistance  - Assess pain using appropriate pain scale  - Administer analgesics based on type and severity of pain and evaluate response  - Implement non-pharmacological measures as appropriate and evaluate response  - Consider cultural and social influences on pain and pain management  - Notify physician/advanced practitioner if interventions unsuccessful or patient reports new pain  Outcome: Progressing     Problem: INFECTION - ADULT  Goal: Absence or prevention of progression during hospitalization  Description  INTERVENTIONS:  - Assess and monitor for signs and symptoms of infection  - Monitor lab/diagnostic results  - Monitor all insertion sites, i e  indwelling lines, tubes, and drains  - Monitor endotracheal if appropriate and nasal secretions for changes in amount and color  - [place] appropriate cooling/warming therapies per order  - Administer medications as ordered  - Instruct and encourage patient and family to use good hand hygiene technique  - Identify and instruct in appropriate isolation precautions for identified infection/condition  Outcome: Progressing  Goal: Absence of fever/infection during neutropenic period  Description  INTERVENTIONS:  - Monitor WBC    Outcome: Progressing     Problem: SAFETY ADULT  Goal: Patient will remain free of falls  Description  INTERVENTIONS:  - Assess patient frequently for physical needs  -  Identify cognitive and physical deficits and behaviors that affect risk of falls    -  [place] fall precautions as indicated by assessment   - Educate patient/family on patient safety including physical limitations  - Instruct patient to call for assistance with activity based on assessment  - Modify environment to reduce risk of injury  - Consider OT/PT consult to assist with strengthening/mobility  Outcome: Progressing  Goal: Maintain or return to baseline ADL function  Description  INTERVENTIONS:  -  Assess patient's ability to carry out ADLs; assess patient's baseline for ADL function and identify physical deficits which impact ability to perform ADLs (bathing, care of mouth/teeth, toileting, grooming, dressing, etc )  - Assess/evaluate cause of self-care deficits   - Assess range of motion  - Assess patient's mobility; develop plan if impaired  - Assess patient's need for assistive devices and provide as appropriate  - Encourage maximum independence but intervene and supervise when necessary  - Involve family in performance of ADLs  - Assess for home care needs following discharge   - Consider OT consult to assist with ADL evaluation and planning for discharge  - Provide patient education as appropriate  Outcome: Progressing  Goal: Maintain or return mobility status to optimal level  Description  INTERVENTIONS:  - Assess patient's baseline mobility status (ambulation, transfers, stairs, etc )    - Identify cognitive and physical deficits and behaviors that affect mobility  - Identify mobility aids required to assist with transfers and/or ambulation (gait belt, sit-to-stand, lift, walker, cane, etc )  - [place] fall precautions as indicated by assessment  - Record patient progress and toleration of activity level on Mobility SBAR; progress patient to next Phase/Stage  - Instruct patient to call for assistance with activity based on assessment  - Consider rehabilitation consult to assist with strengthening/weightbearing, etc   Outcome: Progressing     Problem: DISCHARGE PLANNING  Goal: Discharge to home or other facility with appropriate resources  Description  INTERVENTIONS:  - Identify barriers to discharge w/patient and caregiver  - Arrange for needed discharge resources and transportation as appropriate  - Identify discharge learning needs (meds, wound care, etc )  - Arrange for interpretive services to assist at discharge as needed  - Refer to Case Management Department for coordinating discharge planning if the patient needs post-hospital services based on physician/advanced practitioner order or complex needs related to functional status, cognitive ability, or social support system  Outcome: Progressing     Problem: Knowledge Deficit  Goal: Patient/family/caregiver demonstrates understanding of disease process, treatment plan, medications, and discharge instructions  Description  Complete learning assessment and assess knowledge base    Interventions:  - Provide teaching at level of understanding  - Provide teaching via preferred learning methods  Outcome: Progressing     Problem: SKIN/TISSUE INTEGRITY - ADULT  Goal: Skin integrity remains intact  Description  INTERVENTIONS  - Identify patients at risk for skin breakdown  - Assess and monitor skin integrity  - Assess and monitor nutrition and hydration status  - Monitor labs (i e  albumin)  - Assess for incontinence   - Turn and reposition patient  - Assist with mobility/ambulation  - Relieve pressure over bony prominences  - Avoid friction and shearing  - Provide appropriate hygiene as needed including keeping skin clean and dry  - Evaluate need for skin moisturizer/barrier cream  - Collaborate with interdisciplinary team (i e  Nutrition, Rehabilitation, etc )   - Patient/family teaching  Outcome: Progressing     Problem: MUSCULOSKELETAL - ADULT  Goal: Maintain or return mobility to safest level of function  Description  INTERVENTIONS:  - Assess patient's ability to carry out ADLs; assess patient's baseline for ADL function and identify physical deficits which impact ability to perform ADLs (bathing, care of mouth/teeth, toileting, grooming, dressing, etc )  - Assess/evaluate cause of self-care deficits   - Assess range of motion  - Assess patient's mobility  - Assess patient's need for assistive devices and provide as appropriate  - Encourage maximum independence but intervene and supervise when necessary  - Involve family in performance of ADLs  - Assess for home care needs following discharge   - Consider OT consult to assist with ADL evaluation and planning for discharge  - Provide patient education as appropriate  Outcome: Progressing  Goal: Maintain proper alignment of affected body part  Description  INTERVENTIONS:  - Support, maintain and protect limb and body alignment  - Provide patient/ family with appropriate education  Outcome: Progressing

## 2020-03-11 NOTE — PROGRESS NOTES
INTERNAL MEDICINE RESIDENCY PROGRESS NOTE     Name: Suleiman Alonso   Age & Sex: 62 y o  female   MRN: [id number]  Unit/Bed#: 99 [facility] 822-01   Encounter: [id number]  Team: SOD Team A    PATIENT INFORMATION     Name: Suleiman Alonso   Age & Sex: 62 y o  female   MRN: [id number]  Hospital Stay Days: 1    ASSESSMENT/PLAN     Principal Problem:    Intractable low back pain  Active Problems:    Chronic pain disorder    Major depressive disorder, recurrent episode, moderate degree ([facility])    Bipolar II disorder (Formerly [facility])    Hypertension    Migraine    Tardive dyskinesia      * Intractable low back pain  Assessment & Plan  History of chronic low back pain and bilateral lower extremity pain  Patient came in today for worsening of her pain  Labs significant for CRP for 48 2, sed rate 16, alk phos 131  CT scan Lucency surrounding the superior pedicle screw on the right at T7 with paraspinal soft tissue thickening and endplate erosion   Findings suggest loosening and possible infection   Mild irregularity of endplates is seen at B5-2 and T7-8   The spinal canal is not well evaluated this level due to streak artifact versus any canal narrowing is not excluded  Lucency surrounds the left S1 pedicle screw which also suggests possible loosening and degenerative changes at L5-S1  Thoracic MRI limited, unable to rule out infectious etiology due to poor study    Plan:  · Pain control:  Oxycodone 5 mg Q 4 p r n  for moderate pain, oxycodone 10 mg Q 4 p r n  For severe pain and hydromorphone 1 mg q 6 p r n  For breakthrough pain  · Patient has extensive medication list   Call her pharmacy for medication reconciliation  Patient does not know her home medication list   · Lumbar MRI pending; will provide Ativan and pain medication prior to patient going to imaging department     · Heating pad prn   · Lidocain patch at T12 paraspinal     Major depressive disorder, recurrent episode, moderate degree ([facility] [address] )  Assessment & Plan  Will continue Cymbalta 90 mg total for history of depression  Will hold on to risperidone and quetiapine since patient has severe tardive dyskinesia on physical exam     Chronic pain disorder  Assessment & Plan  Patient with long history of chronic back pain  Follows up with Neurosurgery outpatient  Patient recently evaluated by neurosurgery for possible spinal stimulator  Admits to trying Lyrica, ibuprofen, Tylenol, Cymbalta and was recently prescribed morphine 15 mg b i d  (60 tablets total) by her PCP  Patient admits that none of the pain medications have helped her with her pain so far  Mild feverresolved as of 3/10/2020  Assessment & Plan  With fever of 102 8  Repeat temperature 100 6°  Down trending blood pressure but no episodes of hypotension  Patient denied any URI symptoms or urinary symptoms  Unclear source at this time  Blood  Cultures negative at 24 hours  Plan:  · Continue to monitor fever and WBC daily    Tardive dyskinesia  Assessment & Plan  Patient currently on multiple psychiatric medications  Medication reconciliation is limited because patient does not know which medications she is taking  Patient was found to have tardive dyskinesia on physical exam   Will hold Seroquel, Klonopin and Lamictal   Call her pharmacy before initiating any medications  Migraine  Assessment & Plan  Admits to having her typical migraine headaches on presentation  Will continue home dose of Propanolol 20 mg b i d  Will give IV magnesium and Toradol 15 mg b i d  Hypertension  Assessment & Plan  Will continue amlodipine 10 mg daily  Bipolar II disorder [facility])  Assessment & Plan  Call pharmacy before initiating any psychiatric medication  Patient needs close outpatient psychiatric follow-up  Will hold on to Klonopin, Lamictal, Seroquel, risperidone  Disposition: Pending MRI; discharge tomorrow     SUBJECTIVE     Patient seen and examined  No acute events overnight   Patient continues to complain of back and knee pain  Patient has trouble walking due to the pain  She feels that acute rehab is not helpful  Patient has no other complaints  OBJECTIVE     Vitals:    03/10/20 0743 03/10/20 1530 03/10/20 2320 20 0737   BP: 123/75 137/95 147/88 154/84   Pulse: 70 70 72 62   Resp: 16 16 18 16   Temp: 97 7 °F (36 5 °C) 97 7 °F (36 5 °C) 98 3 °F (36 8 °C) 97 9 °F (36 6 °C)   TempSrc:       SpO2: 99% 98% 100% 100%   Weight:       Height:          Temperature:   Temp (24hrs), Av °F (36 7 °C), Min:97 7 °F (36 5 °C), Max:98 3 °F (36 8 °C)    Temperature: 97 9 °F (36 6 °C)  Intake & Output:  I/O        07 - 03/10 0700 03/10 0701 -  07 -  0700    P  O  1205 645     I V  (mL/kg) 2223 8 (23 7) 2653 3 (28 3)     IV Piggyback       Total Intake(mL/kg) 3428 8 (36 6) 3298 3 (35 2)     Urine (mL/kg/hr) 800 (0 4) 3350 (1 5)     Stool 0      Total Output 800 3350     Net +2628 8 -51 7            Unmeasured Urine Occurrence 2 x 2 x     Unmeasured Stool Occurrence 2 x          Weights:   IBW: 45 5 kg    Body mass index is 40 39 kg/m²  Weight (last 2 days)     None        Physical Exam   Constitutional: She is oriented to person, place, and time  Obese    HENT:   Mouth/Throat: Oropharynx is clear and moist    Eyes: Conjunctivae are normal  No scleral icterus  Neck: No JVD present  Cardiovascular: Normal rate, regular rhythm and normal heart sounds  Pulmonary/Chest: Effort normal and breath sounds normal  No respiratory distress  Abdominal: Soft  Bowel sounds are normal  She exhibits distension  There is no tenderness  Musculoskeletal: She exhibits tenderness (generalized back pain )  She exhibits no edema  Neurological: She is alert and oriented to person, place, and time  No cranial nerve deficit or sensory deficit  She exhibits normal muscle tone  Effort dependent    Skin: She is not diaphoretic  Nursing note and vitals reviewed  LABORATORY DATA     Labs:  I have personally reviewed pertinent reports  Results from last 7 days   Lab Units 03/10/20  0606 03/09/20  0555 03/09/20  0213 03/08/20  2129   WBC Thousand/uL 2 79* 4 99  --  5 56   HEMOGLOBIN g/dL 12 2 14 2  --  14 6   HEMATOCRIT % 37 3 43 1  --  43 3   PLATELETS Thousands/uL 172 219 238 243   NEUTROS PCT % 43  --   --  86*   MONOS PCT % 16*  --   --  7      Results from last 7 days   Lab Units 03/10/20  0606 03/09/20  1551 03/09/20  0555 03/08/20  2129   POTASSIUM mmol/L 3 5 4 1 2 9* 3 1*   CHLORIDE mmol/L 110* 109* 100 100   CO2 mmol/L 24 27 29 28   BUN mg/dL 6 7 9 10   CREATININE mg/dL 0 56* 0 72 0 89 0 82   CALCIUM mg/dL 8 4 8 4 8 6 8 8   ALK PHOS U/L  --   --  120* 131*   ALT U/L  --   --  16 12   AST U/L  --   --  19 14     Results from last 7 days   Lab Units 03/09/20  0555   MAGNESIUM mg/dL 2 5              Results from last 7 days   Lab Units 03/09/20  0229   LACTIC ACID mmol/L 1 3           IMAGING & DIAGNOSTIC TESTING     Radiology Results: I have personally reviewed pertinent reports  Ct Spine Thoracic Wo Contrast    Addendum Date: 3/8/2020    ADDENDUM: There is a typo in the conclusion  The 4th sentence should read "the spinal canal is not well evaluated at this level (T7) due to streak artifact; consequently, soft tissue canal narrowing is not excluded "  Clinical correlation with any neurologic signs including lower extremity weakness may be useful  Result Date: 3/8/2020  Impression: Lucency surrounding the superior pedicle screw on the right at T7 with paraspinal soft tissue thickening and endplate erosion  Findings suggest loosening and possible infection  Mild irregularity of endplates is seen at T0-9 and T7-8  The spinal canal  is not well evaluated this level due to streak artifact versus any canal narrowing is not excluded  Some endplate irregularity is seen at L5-S1 with also sclerosis which is more likely degenerative although some inflammatory component is not excluded   Lucency surrounds the left S1 pedicle screw which also suggests possible loosening  The right screw appears to pass into the right S1 foramen  Streak artifact does limit this study making assessment of canal narrowing difficult elsewhere  Possible erosion involving the right facet of L3-L4  Targeted MRI of the thoracic spine still may be useful with contrast to assess for any new marrow edema as compared to January as well as as well as any new canal involvement but may be limited due to streak artifact  Nuclear medicine study may also be useful  If prior CTs of the spine postoperatively are available that would be very helpful and an addendum could be rendered  Results were discussed with Dr Diane Valencia in the emergency room Workstation performed: [id number]     Ct Spine Lumbar Wo Contrast    Addendum Date: 3/8/2020    ADDENDUM: There is a typo in the conclusion  The 4th sentence should read "the spinal canal is not well evaluated at this level (T7) due to streak artifact; consequently, soft tissue canal narrowing is not excluded "  Clinical correlation with any neurologic signs including lower extremity weakness may be useful  Result Date: 3/8/2020  Impression: Lucency surrounding the superior pedicle screw on the right at T7 with paraspinal soft tissue thickening and endplate erosion  Findings suggest loosening and possible infection  Mild irregularity of endplates is seen at H6-2 and T7-8  The spinal canal  is not well evaluated this level due to streak artifact versus any canal narrowing is not excluded  Some endplate irregularity is seen at L5-S1 with also sclerosis which is more likely degenerative although some inflammatory component is not excluded  Lucency surrounds the left S1 pedicle screw which also suggests possible loosening  The right screw appears to pass into the right S1 foramen  Streak artifact does limit this study making assessment of canal narrowing difficult elsewhere   Possible erosion involving the right facet of L3-L4  Targeted MRI of the thoracic spine still may be useful with contrast to assess for any new marrow edema as compared to January as well as as well as any new canal involvement but may be limited due to streak artifact  Nuclear medicine study may also be useful  If prior CTs of the spine postoperatively are available that would be very helpful and an addendum could be rendered  Results were discussed with Dr Macario Jones in the emergency room Workstation performed: [id number]     Mri Thoracic Spine Wo Contrast    Result Date: 3/10/2020  Impression: Markedly limited exam of the thoracic spine owing to motion and ferromagnetic posterior fixation hardware  Patient is fused from  T7 to the  Solitary, right T7 screw is loose based on CT  Although discitis osteomyelitis at T6-T7 cannot be excluded with certainty, this is the transition point where one would expect significant degenerative changes at the margin between the relatively mobile spine and  the long fixed segment extending to the sacrum  Workstation performed: [id number]     Other Diagnostic Testing: I have personally reviewed pertinent reports      ACTIVE MEDICATIONS     Current Facility-Administered Medications   Medication Dose Route Frequency    amLODIPine (NORVASC) tablet 5 mg  5 mg Oral Daily    bisacodyl (DULCOLAX) EC tablet 10 mg  10 mg Oral Daily    busPIRone (BUSPAR) tablet 5 mg  5 mg Oral TID    cholecalciferol (VITAMIN D3) tablet 1,000 Units  1,000 Units Oral Daily    cyclobenzaprine (FLEXERIL) tablet 5 mg  5 mg Oral TID PRN    diclofenac sodium (VOLTAREN) 1 % topical gel 4 g  4 g Topical 4x Daily    diphenhydrAMINE (BENADRYL) injection 25 mg  25 mg Intravenous Q8H PRN    DULoxetine (CYMBALTA) delayed release capsule 30 mg  30 mg Oral Daily    DULoxetine (CYMBALTA) delayed release capsule 60 mg  60 mg Oral HS    heparin (porcine) subcutaneous injection 7,500 Units  7,500 Units Subcutaneous Q8H [address]    HYDROmorphone (DILAUDID) injection 1 mg  1 mg Intravenous Once PRN    HYDROmorphone (DILAUDID) tablet 1 mg  1 mg Oral Q6H PRN    hydrOXYzine HCL (ATARAX) tablet 25 mg  25 mg Oral Q6H PRN    lactulose 20 g/30 mL oral solution 20 g  20 g Oral BID    lidocaine (LIDODERM) 5 % patch 1 patch  1 patch Topical HS    loratadine (CLARITIN) tablet 10 mg  10 mg Oral Daily    LORazepam (ATIVAN) tablet 0 5 mg  0 5 mg Oral Q8H PRN    LORazepam (ATIVAN) tablet 1 mg  1 mg Oral Once PRN    meloxicam (MOBIC) tablet 15 mg  15 mg Oral Daily    ondansetron (ZOFRAN) injection 4 mg  4 mg Intravenous Q6H PRN    oxybutynin (DITROPAN) tablet 5 mg  5 mg Oral BID    oxyCODONE (ROXICODONE) immediate release tablet 10 mg  10 mg Oral Q4H PRN    oxyCODONE (ROXICODONE) IR tablet 5 mg  5 mg Oral Q4H PRN    pantoprazole (PROTONIX) EC tablet 20 mg  20 mg Oral Early Morning    prazosin (MINIPRESS) capsule 2 mg  2 mg Oral Daily    pregabalin (LYRICA) capsule 100 mg  100 mg Oral TID    propranolol (INDERAL) tablet 20 mg  20 mg Oral BID    QUEtiapine (SEROquel XR) 24 hr tablet 300 mg  300 mg Oral HS    senna-docusate sodium (SENOKOT S) 8 6-50 mg per tablet 1 tablet  1 tablet Oral Daily    sodium chloride 0 9 % infusion  100 mL/hr Intravenous Continuous    traZODone (DESYREL) tablet 150 mg  150 mg Oral HS       VTE Pharmacologic Prophylaxis: Heparin  VTE Mechanical Prophylaxis: sequential compression device    Portions of the record may have been created with voice recognition software  Occasional wrong word or "sound a like" substitutions may have occurred due to the inherent limitations of voice recognition software    Read the chart carefully and recognize, using context, where substitutions have occurred   ==  Yael Villa MD  [facility]  Internal Medicine Residency PGY-1

## 2020-03-12 PROCEDURE — 99232 SBSQ HOSP IP/OBS MODERATE 35: CPT | Performed by: INTERNAL MEDICINE

## 2020-03-12 PROCEDURE — A9585 GADOBUTROL INJECTION: HCPCS | Performed by: INTERNAL MEDICINE

## 2020-03-12 RX ORDER — LACTULOSE 20 G/30ML
20 SOLUTION ORAL DAILY
Status: DISCONTINUED | OUTPATIENT
Start: 2020-03-13 | End: 2020-03-19 | Stop reason: HOSPADM

## 2020-03-12 RX ADMIN — HEPARIN SODIUM 7500 UNITS: 5000 INJECTION INTRAVENOUS; SUBCUTANEOUS at 21:00

## 2020-03-12 RX ADMIN — OXYBUTYNIN CHLORIDE 5 MG: 5 TABLET ORAL at 19:13

## 2020-03-12 RX ADMIN — PRAZOSIN HYDROCHLORIDE 2 MG: 2 CAPSULE ORAL at 08:41

## 2020-03-12 RX ADMIN — DICLOFENAC 4 G: 10 GEL TOPICAL at 23:49

## 2020-03-12 RX ADMIN — DICLOFENAC 4 G: 10 GEL TOPICAL at 12:28

## 2020-03-12 RX ADMIN — PREGABALIN 100 MG: 100 CAPSULE ORAL at 08:41

## 2020-03-12 RX ADMIN — DULOXETINE HYDROCHLORIDE 60 MG: 60 CAPSULE, DELAYED RELEASE ORAL at 21:00

## 2020-03-12 RX ADMIN — SODIUM CHLORIDE 100 ML/HR: 0.9 INJECTION, SOLUTION INTRAVENOUS at 21:45

## 2020-03-12 RX ADMIN — BISACODYL 10 MG: 5 TABLET, COATED ORAL at 08:41

## 2020-03-12 RX ADMIN — MELATONIN 1000 UNITS: at 08:41

## 2020-03-12 RX ADMIN — OXYBUTYNIN CHLORIDE 5 MG: 5 TABLET ORAL at 08:41

## 2020-03-12 RX ADMIN — MELOXICAM 15 MG: 7.5 TABLET ORAL at 08:41

## 2020-03-12 RX ADMIN — QUETIAPINE FUMARATE 300 MG: 300 TABLET, EXTENDED RELEASE ORAL at 23:49

## 2020-03-12 RX ADMIN — HEPARIN SODIUM 7500 UNITS: 5000 INJECTION INTRAVENOUS; SUBCUTANEOUS at 14:56

## 2020-03-12 RX ADMIN — LORATADINE 10 MG: 10 TABLET ORAL at 08:41

## 2020-03-12 RX ADMIN — HEPARIN SODIUM 7500 UNITS: 5000 INJECTION INTRAVENOUS; SUBCUTANEOUS at 05:19

## 2020-03-12 RX ADMIN — PANTOPRAZOLE SODIUM 20 MG: 20 TABLET, DELAYED RELEASE ORAL at 05:19

## 2020-03-12 RX ADMIN — DULOXETINE HYDROCHLORIDE 30 MG: 30 CAPSULE, DELAYED RELEASE ORAL at 08:41

## 2020-03-12 RX ADMIN — BUSPIRONE HYDROCHLORIDE 5 MG: 5 TABLET ORAL at 15:01

## 2020-03-12 RX ADMIN — PREGABALIN 100 MG: 100 CAPSULE ORAL at 15:01

## 2020-03-12 RX ADMIN — AMLODIPINE BESYLATE 5 MG: 5 TABLET ORAL at 08:40

## 2020-03-12 RX ADMIN — TRAZODONE HYDROCHLORIDE 150 MG: 100 TABLET ORAL at 21:00

## 2020-03-12 RX ADMIN — BUSPIRONE HYDROCHLORIDE 5 MG: 5 TABLET ORAL at 08:40

## 2020-03-12 RX ADMIN — DICLOFENAC 4 G: 10 GEL TOPICAL at 08:41

## 2020-03-12 RX ADMIN — PREGABALIN 100 MG: 100 CAPSULE ORAL at 20:48

## 2020-03-12 RX ADMIN — OXYCODONE HYDROCHLORIDE 10 MG: 10 TABLET ORAL at 20:48

## 2020-03-12 RX ADMIN — GADOBUTROL 9 ML: 604.72 INJECTION INTRAVENOUS at 00:21

## 2020-03-12 RX ADMIN — OXYCODONE HYDROCHLORIDE 10 MG: 10 TABLET ORAL at 15:20

## 2020-03-12 RX ADMIN — OXYCODONE HYDROCHLORIDE 10 MG: 10 TABLET ORAL at 10:52

## 2020-03-12 RX ADMIN — OXYCODONE HYDROCHLORIDE 10 MG: 10 TABLET ORAL at 05:24

## 2020-03-12 RX ADMIN — HYDROXYZINE HYDROCHLORIDE 25 MG: 25 TABLET, FILM COATED ORAL at 23:56

## 2020-03-12 RX ADMIN — PROPRANOLOL HYDROCHLORIDE 20 MG: 20 TABLET ORAL at 19:13

## 2020-03-12 RX ADMIN — HYDROMORPHONE HYDROCHLORIDE 1 MG: 2 TABLET ORAL at 23:49

## 2020-03-12 RX ADMIN — PROPRANOLOL HYDROCHLORIDE 20 MG: 20 TABLET ORAL at 08:40

## 2020-03-12 RX ADMIN — LIDOCAINE 1 PATCH: 50 PATCH TOPICAL at 21:00

## 2020-03-12 RX ADMIN — BUSPIRONE HYDROCHLORIDE 5 MG: 5 TABLET ORAL at 20:48

## 2020-03-12 RX ADMIN — SODIUM CHLORIDE 100 ML/HR: 0.9 INJECTION, SOLUTION INTRAVENOUS at 05:19

## 2020-03-12 RX ADMIN — LORAZEPAM 0.5 MG: 0.5 TABLET ORAL at 20:59

## 2020-03-12 RX ADMIN — OXYCODONE HYDROCHLORIDE 10 MG: 10 TABLET ORAL at 00:30

## 2020-03-12 NOTE — SOCIAL WORK
Federico workilng on optioning paper work  Cm called Son Esmer Abel [phone] updated him on option ing process  He verbalized understanding  Esmer Abel also told Cm that the patients daughter is in labor  Esmer Olivacynthia did not know the last name or phone number for the patients ICM

## 2020-03-12 NOTE — PLAN OF CARE
Problem: Potential for Falls  Goal: Patient will remain free of falls  Description  INTERVENTIONS:  - Assess patient frequently for physical needs  -  Identify cognitive and physical deficits and behaviors that affect risk of falls    -  [place] fall precautions as indicated by assessment   - Educate patient/family on patient safety including physical limitations  - Instruct patient to call for assistance with activity based on assessment  - Modify environment to reduce risk of injury  - Consider OT/PT consult to assist with strengthening/mobility  Outcome: Progressing     Problem: Prexisting or High Potential for Compromised Skin Integrity  Goal: Skin integrity is maintained or improved  Description  INTERVENTIONS:  - Identify patients at risk for skin breakdown  - Assess and monitor skin integrity  - Assess and monitor nutrition and hydration status  - Monitor labs   - Assess for incontinence   - Turn and reposition patient  - Assist with mobility/ambulation  - Relieve pressure over bony prominences  - Avoid friction and shearing  - Provide appropriate hygiene as needed including keeping skin clean and dry  - Evaluate need for skin moisturizer/barrier cream  - Collaborate with interdisciplinary team   - Patient/family teaching  - Consider wound care consult   Outcome: Progressing     Problem: PAIN - ADULT  Goal: Verbalizes/displays adequate comfort level or baseline comfort level  Description  Interventions:  - Encourage patient to monitor pain and request assistance  - Assess pain using appropriate pain scale  - Administer analgesics based on type and severity of pain and evaluate response  - Implement non-pharmacological measures as appropriate and evaluate response  - Consider cultural and social influences on pain and pain management  - Notify physician/advanced practitioner if interventions unsuccessful or patient reports new pain  Outcome: Progressing     Problem: INFECTION - ADULT  Goal: Absence or prevention of progression during hospitalization  Description  INTERVENTIONS:  - Assess and monitor for signs and symptoms of infection  - Monitor lab/diagnostic results  - Monitor all insertion sites, i e  indwelling lines, tubes, and drains  - Monitor endotracheal if appropriate and nasal secretions for changes in amount and color  - [place] appropriate cooling/warming therapies per order  - Administer medications as ordered  - Instruct and encourage patient and family to use good hand hygiene technique  - Identify and instruct in appropriate isolation precautions for identified infection/condition  Outcome: Progressing  Goal: Absence of fever/infection during neutropenic period  Description  INTERVENTIONS:  - Monitor WBC    Outcome: Progressing     Problem: SAFETY ADULT  Goal: Patient will remain free of falls  Description  INTERVENTIONS:  - Assess patient frequently for physical needs  -  Identify cognitive and physical deficits and behaviors that affect risk of falls    -  [place] fall precautions as indicated by assessment   - Educate patient/family on patient safety including physical limitations  - Instruct patient to call for assistance with activity based on assessment  - Modify environment to reduce risk of injury  - Consider OT/PT consult to assist with strengthening/mobility  Outcome: Progressing  Goal: Maintain or return to baseline ADL function  Description  INTERVENTIONS:  -  Assess patient's ability to carry out ADLs; assess patient's baseline for ADL function and identify physical deficits which impact ability to perform ADLs (bathing, care of mouth/teeth, toileting, grooming, dressing, etc )  - Assess/evaluate cause of self-care deficits   - Assess range of motion  - Assess patient's mobility; develop plan if impaired  - Assess patient's need for assistive devices and provide as appropriate  - Encourage maximum independence but intervene and supervise when necessary  - Involve family in performance of ADLs  - Assess for home care needs following discharge   - Consider OT consult to assist with ADL evaluation and planning for discharge  - Provide patient education as appropriate  Outcome: Progressing  Goal: Maintain or return mobility status to optimal level  Description  INTERVENTIONS:  - Assess patient's baseline mobility status (ambulation, transfers, stairs, etc )    - Identify cognitive and physical deficits and behaviors that affect mobility  - Identify mobility aids required to assist with transfers and/or ambulation (gait belt, sit-to-stand, lift, walker, cane, etc )  - [place] fall precautions as indicated by assessment  - Record patient progress and toleration of activity level on Mobility SBAR; progress patient to next Phase/Stage  - Instruct patient to call for assistance with activity based on assessment  - Consider rehabilitation consult to assist with strengthening/weightbearing, etc   Outcome: Progressing     Problem: DISCHARGE PLANNING  Goal: Discharge to home or other facility with appropriate resources  Description  INTERVENTIONS:  - Identify barriers to discharge w/patient and caregiver  - Arrange for needed discharge resources and transportation as appropriate  - Identify discharge learning needs (meds, wound care, etc )  - Arrange for interpretive services to assist at discharge as needed  - Refer to Case Management Department for coordinating discharge planning if the patient needs post-hospital services based on physician/advanced practitioner order or complex needs related to functional status, cognitive ability, or social support system  Outcome: Progressing     Problem: Knowledge Deficit  Goal: Patient/family/caregiver demonstrates understanding of disease process, treatment plan, medications, and discharge instructions  Description  Complete learning assessment and assess knowledge base    Interventions:  - Provide teaching at level of understanding  - Provide teaching via preferred learning methods  Outcome: Progressing     Problem: SKIN/TISSUE INTEGRITY - ADULT  Goal: Skin integrity remains intact  Description  INTERVENTIONS  - Identify patients at risk for skin breakdown  - Assess and monitor skin integrity  - Assess and monitor nutrition and hydration status  - Monitor labs (i e  albumin)  - Assess for incontinence   - Turn and reposition patient  - Assist with mobility/ambulation  - Relieve pressure over bony prominences  - Avoid friction and shearing  - Provide appropriate hygiene as needed including keeping skin clean and dry  - Evaluate need for skin moisturizer/barrier cream  - Collaborate with interdisciplinary team (i e  Nutrition, Rehabilitation, etc )   - Patient/family teaching  Outcome: Progressing     Problem: MUSCULOSKELETAL - ADULT  Goal: Maintain or return mobility to safest level of function  Description  INTERVENTIONS:  - Assess patient's ability to carry out ADLs; assess patient's baseline for ADL function and identify physical deficits which impact ability to perform ADLs (bathing, care of mouth/teeth, toileting, grooming, dressing, etc )  - Assess/evaluate cause of self-care deficits   - Assess range of motion  - Assess patient's mobility  - Assess patient's need for assistive devices and provide as appropriate  - Encourage maximum independence but intervene and supervise when necessary  - Involve family in performance of ADLs  - Assess for home care needs following discharge   - Consider OT consult to assist with ADL evaluation and planning for discharge  - Provide patient education as appropriate  Outcome: Progressing  Goal: Maintain proper alignment of affected body part  Description  INTERVENTIONS:  - Support, maintain and protect limb and body alignment  - Provide patient/ family with appropriate education  Outcome: Progressing

## 2020-03-12 NOTE — PROGRESS NOTES
Cultivated a relationship of care and support  Pt declined  support      03/12/20 1500   Clinical Encounter Type   Visited With Patient   Routine Visit Introduction

## 2020-03-12 NOTE — UTILIZATION REVIEW
Notification of Discharge  This is a Notification of Discharge from our facility [address]  Please be advised that this patient has been discharge from our facility  Below you will find the admission and discharge date and time including the patients disposition  PRESENTATION DATE: 3/8/2020  6:38 PM  OBS ADMISSION DATE:   IP ADMISSION DATE: 3/10/20 0728   DISCHARGE DATE: No discharge date for patient encounter  DISPOSITION: Home/Self Care Home/Self Care   Admission Orders listed below:  Admission Orders (From admission, onward)     Ordered        03/10/20 0728  Inpatient Admission  Once         03/08/20 2226  Place in Observation (expected length of stay for this patient is less than two midnights)  Once                   Please contact the UR Department if additional information is required to close this patient's authorization/case  [address]rese [facility]ia Utilization Review Department  Main: [phone] x carefully listen to the prompts  All voicemails are confidential   [email or web address]  Send all requests for admission clinical reviews, approved or denied determinations and any other requests to dedicated fax number below belonging to the campus where the patient is receiving treatment   List of dedicated fax numbers:  [address] [place] [address] DENIALS (Administrative/Medical Necessity) [phone]   [address]  (Maternity/NICU/Pediatrics) [phone]   [facility] [phone]   [facility] [phone]   [facility] [phone]   [address] [facility] [address] [facility] [phone]   [facility]  [phone]   [address] [facility] [address]  [address] [facility] [address] [facility] [phone]

## 2020-03-13 PROCEDURE — 97535 SELF CARE MNGMENT TRAINING: CPT

## 2020-03-13 PROCEDURE — 99232 SBSQ HOSP IP/OBS MODERATE 35: CPT | Performed by: INTERNAL MEDICINE

## 2020-03-13 PROCEDURE — 99222 1ST HOSP IP/OBS MODERATE 55: CPT | Performed by: PSYCHIATRY & NEUROLOGY

## 2020-03-13 RX ORDER — HYDROXYZINE 50 MG/1
50 TABLET, FILM COATED ORAL
Status: DISCONTINUED | OUTPATIENT
Start: 2020-03-13 | End: 2020-03-19 | Stop reason: HOSPADM

## 2020-03-13 RX ADMIN — OXYBUTYNIN CHLORIDE 5 MG: 5 TABLET ORAL at 08:17

## 2020-03-13 RX ADMIN — BUSPIRONE HYDROCHLORIDE 5 MG: 5 TABLET ORAL at 17:51

## 2020-03-13 RX ADMIN — PRAZOSIN HYDROCHLORIDE 2 MG: 2 CAPSULE ORAL at 08:17

## 2020-03-13 RX ADMIN — BUSPIRONE HYDROCHLORIDE 5 MG: 5 TABLET ORAL at 21:10

## 2020-03-13 RX ADMIN — OXYCODONE HYDROCHLORIDE 10 MG: 10 TABLET ORAL at 04:28

## 2020-03-13 RX ADMIN — PREGABALIN 100 MG: 100 CAPSULE ORAL at 21:09

## 2020-03-13 RX ADMIN — PREGABALIN 100 MG: 100 CAPSULE ORAL at 17:51

## 2020-03-13 RX ADMIN — PROPRANOLOL HYDROCHLORIDE 20 MG: 20 TABLET ORAL at 08:17

## 2020-03-13 RX ADMIN — SENNOSIDES AND DOCUSATE SODIUM 1 TABLET: 8.6; 5 TABLET ORAL at 08:16

## 2020-03-13 RX ADMIN — PROPRANOLOL HYDROCHLORIDE 20 MG: 20 TABLET ORAL at 17:51

## 2020-03-13 RX ADMIN — DICLOFENAC 4 G: 10 GEL TOPICAL at 13:37

## 2020-03-13 RX ADMIN — DICLOFENAC 4 G: 10 GEL TOPICAL at 08:16

## 2020-03-13 RX ADMIN — LIDOCAINE 1 PATCH: 50 PATCH TOPICAL at 21:10

## 2020-03-13 RX ADMIN — MELATONIN 1000 UNITS: at 08:17

## 2020-03-13 RX ADMIN — DULOXETINE HYDROCHLORIDE 60 MG: 60 CAPSULE, DELAYED RELEASE ORAL at 21:09

## 2020-03-13 RX ADMIN — HEPARIN SODIUM 7500 UNITS: 5000 INJECTION INTRAVENOUS; SUBCUTANEOUS at 21:10

## 2020-03-13 RX ADMIN — PREGABALIN 100 MG: 100 CAPSULE ORAL at 08:17

## 2020-03-13 RX ADMIN — OXYCODONE HYDROCHLORIDE 10 MG: 10 TABLET ORAL at 13:37

## 2020-03-13 RX ADMIN — DULOXETINE HYDROCHLORIDE 30 MG: 30 CAPSULE, DELAYED RELEASE ORAL at 08:17

## 2020-03-13 RX ADMIN — LORATADINE 10 MG: 10 TABLET ORAL at 08:17

## 2020-03-13 RX ADMIN — BUSPIRONE HYDROCHLORIDE 5 MG: 5 TABLET ORAL at 08:17

## 2020-03-13 RX ADMIN — LACTULOSE 20 G: 10 SOLUTION ORAL at 08:16

## 2020-03-13 RX ADMIN — LORAZEPAM 0.5 MG: 0.5 TABLET ORAL at 17:59

## 2020-03-13 RX ADMIN — OXYBUTYNIN CHLORIDE 5 MG: 5 TABLET ORAL at 17:52

## 2020-03-13 RX ADMIN — HEPARIN SODIUM 7500 UNITS: 5000 INJECTION INTRAVENOUS; SUBCUTANEOUS at 13:37

## 2020-03-13 RX ADMIN — HYDROXYZINE HYDROCHLORIDE 50 MG: 50 TABLET, FILM COATED ORAL at 21:10

## 2020-03-13 RX ADMIN — DICLOFENAC 4 G: 10 GEL TOPICAL at 21:11

## 2020-03-13 RX ADMIN — HYDROMORPHONE HYDROCHLORIDE 1 MG: 2 TABLET ORAL at 21:10

## 2020-03-13 RX ADMIN — DICLOFENAC 4 G: 10 GEL TOPICAL at 17:52

## 2020-03-13 RX ADMIN — MELOXICAM 15 MG: 7.5 TABLET ORAL at 08:16

## 2020-03-13 RX ADMIN — TRAZODONE HYDROCHLORIDE 150 MG: 100 TABLET ORAL at 21:09

## 2020-03-13 RX ADMIN — OXYCODONE HYDROCHLORIDE 10 MG: 10 TABLET ORAL at 19:17

## 2020-03-13 RX ADMIN — SODIUM CHLORIDE 100 ML/HR: 0.9 INJECTION, SOLUTION INTRAVENOUS at 17:51

## 2020-03-13 RX ADMIN — SODIUM CHLORIDE 100 ML/HR: 0.9 INJECTION, SOLUTION INTRAVENOUS at 07:24

## 2020-03-13 RX ADMIN — BISACODYL 10 MG: 5 TABLET, COATED ORAL at 08:17

## 2020-03-13 RX ADMIN — PANTOPRAZOLE SODIUM 20 MG: 20 TABLET, DELAYED RELEASE ORAL at 05:58

## 2020-03-13 RX ADMIN — HEPARIN SODIUM 7500 UNITS: 5000 INJECTION INTRAVENOUS; SUBCUTANEOUS at 05:58

## 2020-03-13 RX ADMIN — QUETIAPINE FUMARATE 300 MG: 300 TABLET, EXTENDED RELEASE ORAL at 21:11

## 2020-03-13 RX ADMIN — AMLODIPINE BESYLATE 5 MG: 5 TABLET ORAL at 08:17

## 2020-03-13 NOTE — PROGRESS NOTES
INTERNAL MEDICINE RESIDENCY PROGRESS NOTE     Name: Leslie Baez   Age & Sex: 62 y o  female   MRN: [id number]  Unit/Bed#: [address] 822-01   Encounter: [id number]  Team: SOD Team A    PATIENT INFORMATION     Name: Leslie Baez   Age & Sex: 62 y o  female   MRN: [id number]  Hospital Stay Days: 3    ASSESSMENT/PLAN     Principal Problem:    Intractable low back pain  Active Problems:    Chronic pain disorder    Major depressive disorder, recurrent episode, moderate degree ([facility])    Bipolar II disorder ([facility])    Hypertension    Migraine    Tardive dyskinesia      * Intractable low back pain  Assessment & Plan  History of chronic low back pain and bilateral lower extremity pain  Patient came in today for worsening of her pain  Labs significant for CRP for 48 2, sed rate 16, alk phos 131  CT scan Lucency surrounding the superior pedicle screw on the right at T7 with paraspinal soft tissue thickening and endplate erosion   Findings suggest loosening and possible infection   Mild irregularity of endplates is seen at C9-1 and T7-8   The spinal canal is not well evaluated this level due to streak artifact versus any canal narrowing is not excluded  Lucency surrounds the left S1 pedicle screw which also suggests possible loosening and degenerative changes at L5-S1  Thoracic MRI limited, unable to rule out infectious etiology due to poor study    Lumbar MRI, limited however no gross pathologic processes  Plan:  · Pain control:  Oxycodone 5 mg Q 4 p r n  for moderate pain, oxycodone 10 mg Q 4 p r n  For severe pain and hydromorphone 1 mg q 6 p r n  For breakthrough pain  · Patient has extensive medication list   Call her pharmacy for medication reconciliation    Patient does not know her home medication list   · Neurosurgical outpatient follow-up for possible spinal stimulator   · Heating pad prn   · Lidocain patch at T12 paraspinal     Major depressive disorder, recurrent episode, moderate degree ([facility])  Assessment & Plan  Will continue Cymbalta 90 mg total for history of depression  Will hold on to risperidone and quetiapine since patient has severe tardive dyskinesia on physical exam     Chronic pain disorder  Assessment & Plan  Patient with long history of chronic back pain  Follows up with Neurosurgery outpatient  Patient recently evaluated by neurosurgery for possible spinal stimulator  Admits to trying Lyrica, ibuprofen, Tylenol, Cymbalta and was recently prescribed morphine 15 mg b i d  (60 tablets total) by her PCP  Patient admits that none of the pain medications have helped her with her pain so far  Mild feverresolved as of 3/10/2020  Assessment & Plan  With fever of 102 8  Repeat temperature 100 6°  Down trending blood pressure but no episodes of hypotension  Patient denied any URI symptoms or urinary symptoms  Unclear source at this time  Blood  Cultures negative at 24 hours  Plan:  · Continue to monitor fever and WBC daily    Tardive dyskinesia  Assessment & Plan  Patient currently on multiple psychiatric medications  Medication reconciliation is limited because patient does not know which medications she is taking  Patient was found to have tardive dyskinesia on physical exam   Will hold Seroquel, Klonopin and Lamictal   Call her pharmacy before initiating any medications  Migraine  Assessment & Plan  Admits to having her typical migraine headaches on presentation  Will continue home dose of Propanolol 20 mg b i d  Will give IV magnesium and Toradol 15 mg b i d  Hypertension  Assessment & Plan  Will continue amlodipine 10 mg daily  Bipolar II disorder [facility])  Assessment & Plan  Call pharmacy before initiating any psychiatric medication  Patient needs close outpatient psychiatric follow-up  Will hold on to Klonopin, Lamictal, Seroquel, risperidone  Disposition: Medically stable; pending discharge      SUBJECTIVE     Patient seen and examined  No acute events overnight   Patient is concerned that she is not getting her psychiatric medications  We went over her medications here and what she had been prescribed by outpatient physician  She has no other complaints besides pain  She was able to sit at edge of the bed and use the commode with assistance  Patient not in acute distress  OBJECTIVE     Vitals:    20 2230 20 2236 20 0822 20 0905   BP: (!) 151/101 160/96  152/88   Pulse: 65  74 72   Resp: 18   16   Temp: 98 3 °F (36 8 °C)   98 2 °F (36 8 °C)   TempSrc:    Oral   SpO2: 98%  99% 99%   Weight:       Height:          Temperature:   Temp (24hrs), Av 4 °F (36 9 °C), Min:98 2 °F (36 8 °C), Max:98 6 °F (37 °C)    Temperature: 98 2 °F (36 8 °C)  Intake & Output:  I/O        07 -  07 -  0700  07 -  0700    P  O  740 1320 540    I V  (mL/kg) 2521 7 (26 9) 1601 7 (17 1) 965 (10 3)    Total Intake(mL/kg) 3261 7 (34 8) 2921 7 (31 1) 1505 (16)    Urine (mL/kg/hr) 1050 (0 5) 3840 (1 7)     Stool 0      Total Output 1050 3840     Net +2211 7 -918 3 +1505           Unmeasured Urine Occurrence 2 x  2 x    Unmeasured Stool Occurrence 1 x  2 x        Weights:   IBW: 45 5 kg    Body mass index is 40 39 kg/m²  Weight (last 2 days)     None        Physical Exam   Constitutional: She is oriented to person, place, and time  She appears well-developed and well-nourished  HENT:   Mouth/Throat: Oropharynx is clear and moist    Eyes: Conjunctivae are normal  No scleral icterus  Neck: No JVD present  Cardiovascular: Normal rate, regular rhythm and normal heart sounds  Pulmonary/Chest: Effort normal and breath sounds normal    Abdominal: Soft  Bowel sounds are normal  She exhibits distension  Obese abdomen    Musculoskeletal: She exhibits no edema or tenderness  Neurological: She is alert and oriented to person, place, and time  No cranial nerve deficit or sensory deficit  She exhibits normal muscle tone     Psychiatric: She has a normal mood and affect  Nursing note and vitals reviewed  LABORATORY DATA     Labs: I have personally reviewed pertinent reports  Results from last 7 days   Lab Units 03/10/20  0606 03/09/20  0555 03/09/20  0213 03/08/20  2129   WBC Thousand/uL 2 79* 4 99  --  5 56   HEMOGLOBIN g/dL 12 2 14 2  --  14 6   HEMATOCRIT % 37 3 43 1  --  43 3   PLATELETS Thousands/uL 172 219 238 243   NEUTROS PCT % 43  --   --  86*   MONOS PCT % 16*  --   --  7      Results from last 7 days   Lab Units 03/10/20  0606 03/09/20  1551 03/09/20  0555 03/08/20  2129   POTASSIUM mmol/L 3 5 4 1 2 9* 3 1*   CHLORIDE mmol/L 110* 109* 100 100   CO2 mmol/L 24 27 29 28   BUN mg/dL 6 7 9 10   CREATININE mg/dL 0 56* 0 72 0 89 0 82   CALCIUM mg/dL 8 4 8 4 8 6 8 8   ALK PHOS U/L  --   --  120* 131*   ALT U/L  --   --  16 12   AST U/L  --   --  19 14     Results from last 7 days   Lab Units 03/09/20  0555   MAGNESIUM mg/dL 2 5              Results from last 7 days   Lab Units 03/09/20  0229   LACTIC ACID mmol/L 1 3           IMAGING & DIAGNOSTIC TESTING     Radiology Results: I have personally reviewed pertinent reports  Ct Spine Thoracic Wo Contrast    Addendum Date: 3/8/2020    ADDENDUM: There is a typo in the conclusion  The 4th sentence should read "the spinal canal is not well evaluated at this level (T7) due to streak artifact; consequently, soft tissue canal narrowing is not excluded "  Clinical correlation with any neurologic signs including lower extremity weakness may be useful  Result Date: 3/8/2020  Impression: Lucency surrounding the superior pedicle screw on the right at T7 with paraspinal soft tissue thickening and endplate erosion  Findings suggest loosening and possible infection  Mild irregularity of endplates is seen at A6-5 and T7-8  The spinal canal  is not well evaluated this level due to streak artifact versus any canal narrowing is not excluded   Some endplate irregularity is seen at L5-S1 with also sclerosis which is more likely degenerative although some inflammatory component is not excluded  Lucency surrounds the left S1 pedicle screw which also suggests possible loosening  The right screw appears to pass into the right S1 foramen  Streak artifact does limit this study making assessment of canal narrowing difficult elsewhere  Possible erosion involving the right facet of L3-L4  Targeted MRI of the thoracic spine still may be useful with contrast to assess for any new marrow edema as compared to January as well as as well as any new canal involvement but may be limited due to streak artifact  Nuclear medicine study may also be useful  If prior CTs of the spine postoperatively are available that would be very helpful and an addendum could be rendered  Results were discussed with Dr Smith Pappas in the emergency room Workstation performed: [id number]     Ct Spine Lumbar Wo Contrast    Addendum Date: 3/8/2020    ADDENDUM: There is a typo in the conclusion  The 4th sentence should read "the spinal canal is not well evaluated at this level (T7) due to streak artifact; consequently, soft tissue canal narrowing is not excluded "  Clinical correlation with any neurologic signs including lower extremity weakness may be useful  Result Date: 3/8/2020  Impression: Lucency surrounding the superior pedicle screw on the right at T7 with paraspinal soft tissue thickening and endplate erosion  Findings suggest loosening and possible infection  Mild irregularity of endplates is seen at A3-5 and T7-8  The spinal canal  is not well evaluated this level due to streak artifact versus any canal narrowing is not excluded  Some endplate irregularity is seen at L5-S1 with also sclerosis which is more likely degenerative although some inflammatory component is not excluded  Lucency surrounds the left S1 pedicle screw which also suggests possible loosening  The right screw appears to pass into the right S1 foramen   Streak artifact does limit this study making assessment of canal narrowing difficult elsewhere  Possible erosion involving the right facet of L3-L4  Targeted MRI of the thoracic spine still may be useful with contrast to assess for any new marrow edema as compared to January as well as as well as any new canal involvement but may be limited due to streak artifact  Nuclear medicine study may also be useful  If prior CTs of the spine postoperatively are available that would be very helpful and an addendum could be rendered  Results were discussed with Dr Milli Espinal in the emergency room Workstation performed: [id number]     Mri Thoracic Spine Wo Contrast    Result Date: 3/10/2020  Impression: Markedly limited exam of the thoracic spine owing to motion and ferromagnetic posterior fixation hardware  Patient is fused from  T7 to the  Solitary, right T7 screw is loose based on CT  Although discitis osteomyelitis at T6-T7 cannot be excluded with certainty, this is the transition point where one would expect significant degenerative changes at the margin between the relatively mobile spine and  the long fixed segment extending to the sacrum  Workstation performed: [id number]     Mri Lumbar Spine W Wo Contrast    Result Date: 3/12/2020  Impression: Markedly limited exam owing to extensive surgical hardware  Enhancement and edema which occur within the vertebral bodies and disc at the T6-T7 level are nonspecific  There is no paraspinal phlegmon  This is favored to be degenerative in nature  However, serologic exclusion suggested with ESR, CRP and WBC  There does not appear to be significant lumbar spine canal stenosis  Workstation performed: [id number]     Other Diagnostic Testing: I have personally reviewed pertinent reports      ACTIVE MEDICATIONS     Current Facility-Administered Medications   Medication Dose Route Frequency    amLODIPine (NORVASC) tablet 5 mg  5 mg Oral Daily    bisacodyl (DULCOLAX) EC tablet 10 mg  10 mg Oral Daily    busPIRone (BUSPAR) tablet 5 mg  5 mg Oral TID    cholecalciferol (VITAMIN D3) tablet 1,000 Units  1,000 Units Oral Daily    cyclobenzaprine (FLEXERIL) tablet 5 mg  5 mg Oral TID PRN    diclofenac sodium (VOLTAREN) 1 % topical gel 4 g  4 g Topical 4x Daily    DULoxetine (CYMBALTA) delayed release capsule 30 mg  30 mg Oral Daily    DULoxetine (CYMBALTA) delayed release capsule 60 mg  60 mg Oral HS    heparin (porcine) subcutaneous injection 7,500 Units  7,500 Units Subcutaneous Q8H [address]    HYDROmorphone (DILAUDID) tablet 1 mg  1 mg Oral Q6H PRN    hydrOXYzine HCL (ATARAX) tablet 50 mg  50 mg Oral HS    lactulose 20 g/30 mL oral solution 20 g  20 g Oral Daily    lidocaine (LIDODERM) 5 % patch 1 patch  1 patch Topical HS    loratadine (CLARITIN) tablet 10 mg  10 mg Oral Daily    LORazepam (ATIVAN) tablet 0 5 mg  0 5 mg Oral Q8H PRN    meloxicam (MOBIC) tablet 15 mg  15 mg Oral Daily    ondansetron (ZOFRAN) injection 4 mg  4 mg Intravenous Q6H PRN    oxybutynin (DITROPAN) tablet 5 mg  5 mg Oral BID    oxyCODONE (ROXICODONE) immediate release tablet 10 mg  10 mg Oral Q4H PRN    oxyCODONE (ROXICODONE) IR tablet 5 mg  5 mg Oral Q4H PRN    pantoprazole (PROTONIX) EC tablet 20 mg  20 mg Oral Early Morning    prazosin (MINIPRESS) capsule 2 mg  2 mg Oral Daily    pregabalin (LYRICA) capsule 100 mg  100 mg Oral TID    propranolol (INDERAL) tablet 20 mg  20 mg Oral BID    QUEtiapine (SEROquel XR) 24 hr tablet 300 mg  300 mg Oral HS    senna-docusate sodium (SENOKOT S) 8 6-50 mg per tablet 1 tablet  1 tablet Oral Daily    sodium chloride 0 9 % infusion  100 mL/hr Intravenous Continuous    traZODone (DESYREL) tablet 150 mg  150 mg Oral HS       VTE Pharmacologic Prophylaxis: Heparin  VTE Mechanical Prophylaxis: sequential compression device    Portions of the record may have been created with voice recognition software    Occasional wrong word or "sound a like" substitutions may have occurred due to the inherent limitations of voice recognition software    Read the chart carefully and recognize, using context, where substitutions have occurred   ==  Pedro Ribera MD  [address]  Internal Medicine Residency PGY-1

## 2020-03-13 NOTE — PLAN OF CARE
Problem: Potential for Falls  Goal: Patient will remain free of falls  Description  INTERVENTIONS:  - Assess patient frequently for physical needs  -  Identify cognitive and physical deficits and behaviors that affect risk of falls    -  [place] fall precautions as indicated by assessment   - Educate patient/family on patient safety including physical limitations  - Instruct patient to call for assistance with activity based on assessment  - Modify environment to reduce risk of injury  - Consider OT/PT consult to assist with strengthening/mobility  Outcome: Progressing     Problem: Prexisting or High Potential for Compromised Skin Integrity  Goal: Skin integrity is maintained or improved  Description  INTERVENTIONS:  - Identify patients at risk for skin breakdown  - Assess and monitor skin integrity  - Assess and monitor nutrition and hydration status  - Monitor labs   - Assess for incontinence   - Turn and reposition patient  - Assist with mobility/ambulation  - Relieve pressure over bony prominences  - Avoid friction and shearing  - Provide appropriate hygiene as needed including keeping skin clean and dry  - Evaluate need for skin moisturizer/barrier cream  - Collaborate with interdisciplinary team   - Patient/family teaching  - Consider wound care consult   Outcome: Progressing     Problem: PAIN - ADULT  Goal: Verbalizes/displays adequate comfort level or baseline comfort level  Description  Interventions:  - Encourage patient to monitor pain and request assistance  - Assess pain using appropriate pain scale  - Administer analgesics based on type and severity of pain and evaluate response  - Implement non-pharmacological measures as appropriate and evaluate response  - Consider cultural and social influences on pain and pain management  - Notify physician/advanced practitioner if interventions unsuccessful or patient reports new pain  Outcome: Progressing     Problem: INFECTION - ADULT  Goal: Absence or prevention of progression during hospitalization  Description  INTERVENTIONS:  - Assess and monitor for signs and symptoms of infection  - Monitor lab/diagnostic results  - Monitor all insertion sites, i e  indwelling lines, tubes, and drains  - Monitor endotracheal if appropriate and nasal secretions for changes in amount and color  - [place] appropriate cooling/warming therapies per order  - Administer medications as ordered  - Instruct and encourage patient and family to use good hand hygiene technique  - Identify and instruct in appropriate isolation precautions for identified infection/condition  Outcome: Progressing  Goal: Absence of fever/infection during neutropenic period  Description  INTERVENTIONS:  - Monitor WBC    Outcome: Progressing     Problem: SAFETY ADULT  Goal: Patient will remain free of falls  Description  INTERVENTIONS:  - Assess patient frequently for physical needs  -  Identify cognitive and physical deficits and behaviors that affect risk of falls    -  [place] fall precautions as indicated by assessment   - Educate patient/family on patient safety including physical limitations  - Instruct patient to call for assistance with activity based on assessment  - Modify environment to reduce risk of injury  - Consider OT/PT consult to assist with strengthening/mobility  Outcome: Progressing  Goal: Maintain or return to baseline ADL function  Description  INTERVENTIONS:  -  Assess patient's ability to carry out ADLs; assess patient's baseline for ADL function and identify physical deficits which impact ability to perform ADLs (bathing, care of mouth/teeth, toileting, grooming, dressing, etc )  - Assess/evaluate cause of self-care deficits   - Assess range of motion  - Assess patient's mobility; develop plan if impaired  - Assess patient's need for assistive devices and provide as appropriate  - Encourage maximum independence but intervene and supervise when necessary  - Involve family in performance of ADLs  - Assess for home care needs following discharge   - Consider OT consult to assist with ADL evaluation and planning for discharge  - Provide patient education as appropriate  Outcome: Progressing  Goal: Maintain or return mobility status to optimal level  Description  INTERVENTIONS:  - Assess patient's baseline mobility status (ambulation, transfers, stairs, etc )    - Identify cognitive and physical deficits and behaviors that affect mobility  - Identify mobility aids required to assist with transfers and/or ambulation (gait belt, sit-to-stand, lift, walker, cane, etc )  - [place] fall precautions as indicated by assessment  - Record patient progress and toleration of activity level on Mobility SBAR; progress patient to next Phase/Stage  - Instruct patient to call for assistance with activity based on assessment  - Consider rehabilitation consult to assist with strengthening/weightbearing, etc   Outcome: Progressing     Problem: DISCHARGE PLANNING  Goal: Discharge to home or other facility with appropriate resources  Description  INTERVENTIONS:  - Identify barriers to discharge w/patient and caregiver  - Arrange for needed discharge resources and transportation as appropriate  - Identify discharge learning needs (meds, wound care, etc )  - Arrange for interpretive services to assist at discharge as needed  - Refer to Case Management Department for coordinating discharge planning if the patient needs post-hospital services based on physician/advanced practitioner order or complex needs related to functional status, cognitive ability, or social support system  Outcome: Progressing     Problem: Knowledge Deficit  Goal: Patient/family/caregiver demonstrates understanding of disease process, treatment plan, medications, and discharge instructions  Description  Complete learning assessment and assess knowledge base    Interventions:  - Provide teaching at level of understanding  - Provide teaching via preferred learning methods  Outcome: Progressing     Problem: SKIN/TISSUE INTEGRITY - ADULT  Goal: Skin integrity remains intact  Description  INTERVENTIONS  - Identify patients at risk for skin breakdown  - Assess and monitor skin integrity  - Assess and monitor nutrition and hydration status  - Monitor labs (i e  albumin)  - Assess for incontinence   - Turn and reposition patient  - Assist with mobility/ambulation  - Relieve pressure over bony prominences  - Avoid friction and shearing  - Provide appropriate hygiene as needed including keeping skin clean and dry  - Evaluate need for skin moisturizer/barrier cream  - Collaborate with interdisciplinary team (i e  Nutrition, Rehabilitation, etc )   - Patient/family teaching  Outcome: Progressing     Problem: MUSCULOSKELETAL - ADULT  Goal: Maintain or return mobility to safest level of function  Description  INTERVENTIONS:  - Assess patient's ability to carry out ADLs; assess patient's baseline for ADL function and identify physical deficits which impact ability to perform ADLs (bathing, care of mouth/teeth, toileting, grooming, dressing, etc )  - Assess/evaluate cause of self-care deficits   - Assess range of motion  - Assess patient's mobility  - Assess patient's need for assistive devices and provide as appropriate  - Encourage maximum independence but intervene and supervise when necessary  - Involve family in performance of ADLs  - Assess for home care needs following discharge   - Consider OT consult to assist with ADL evaluation and planning for discharge  - Provide patient education as appropriate  Outcome: Progressing  Goal: Maintain proper alignment of affected body part  Description  INTERVENTIONS:  - Support, maintain and protect limb and body alignment  - Provide patient/ family with appropriate education  Outcome: Progressing

## 2020-03-13 NOTE — PHYSICAL THERAPY NOTE
Physical Therapy Cancellation Note  Attempted to work with patient with patient refusing session at this time  Patient reporting "I just got back into bed, maybe later " Therapist educated patient on importance of increased activity with patient continuing to refuse  Will continue to follow up with patient as appropriate       Kathyrn Duane, PTA

## 2020-03-13 NOTE — PLAN OF CARE
Problem: OCCUPATIONAL THERAPY ADULT  Goal: Performs self-care activities at highest level of function for planned discharge setting  See evaluation for individualized goals  Description  Treatment Interventions: ADL retraining, Functional transfer training, Endurance training, Cognitive reorientation, Patient/family training, Equipment evaluation/education, Compensatory technique education, Activityengagement          See flowsheet documentation for full assessment, interventions and recommendations  Outcome: Progressing  Note:   Limitation: Decreased ADL status, Decreased Safe judgement during ADL, Decreased cognition, Decreased endurance, Decreased self-care trans, Decreased high-level ADLs  Prognosis: Fair  Assessment: Patient participated in Skilled OT session this date with interventions consisting of self care tasks edge of bed, functional transfers-including bedside commode, toileting tasks-see assistance levels above   Patient agreeable to OT treatment session, upon arrival patient was found supine in bed and seated at edge of bed  In comparison to previous session, patient with improvements in self care tasks, transfers, cognition   Patient requiring frequent rest periods and ocassional safety reminders  Patient continues to be functioning below baseline level, occupational performance remains limited secondary to factors listed above and increased risk for falls and injury  From OT standpoint, recommendation at time of d/c would be Short Term Rehab  Patient to benefit from continued Occupational Therapy treatment while in the hospital to address deficits as defined above and maximize level of functional independence with ADLs and functional mobility  OT Discharge Recommendation: Short Term Rehab  OT - OK to Discharge:  Yes

## 2020-03-13 NOTE — OCCUPATIONAL THERAPY NOTE
OccupationalTherapy Progress Note     Patient Name: Tiana Dougherty  HTMNY'G Date: 3/13/2020  Problem List  Principal Problem:    Intractable low back pain  Active Problems:    Chronic pain disorder    Bipolar II disorder ([address] )    Hypertension    Major depressive disorder, recurrent episode, moderate degree (HCC)    Migraine    Tardive dyskinesia          03/13/20 1379   Restrictions/Precautions   Weight Bearing Precautions Per Order No   Other Precautions Cognitive; Chair Alarm;Multiple lines;Telemetry; Fall Risk;Pain   Lifestyle   Autonomy I ADL's, assistance from family with IADL's, +[employer] van for transportation   Reciprocal Relationships son, aunt, family   Service to Others on disability   Intrinsic Gratification acqua therapy   Pain Assessment   Pain Assessment Tool King-Baker FACES   King-Baker FACES Pain Rating 6   Pain Location/Orientation Location: Leg;Orientation: Left;Orientation: Right   Hospital Pain Intervention(s) Repositioned; Ambulation/increased activity; Elevated; Emotional support; Environmental changes; Rest   ADL   Where Assessed Edge of bed   Grooming Assistance 5  Supervision/Setup   Grooming Deficit Setup; Wash/dry hands; Wash/dry face; Teeth care   UB Bathing Assistance 5  Supervision/Setup   UB Bathing Deficit Setup   LB Bathing Assistance 3  Moderate Assistance   LB Bathing Deficit Right lower leg including foot; Left lower leg including foot; Buttocks   LB Bathing Comments seated and standing   UB Dressing Assistance 4  Minimal Assistance   UB Dressing Deficit   (+hospital gown)   LB Dressing Assistance 2  Maximal Assistance   LB Dressing Deficit Thread LLE into underwear; Thread RLE into underwear; Thread LLE into pants; Thread RLE into pants;Pull up over hips   LB Dressing Comments pt able to pull underwear up over knees and assist with hiking underwear over hips, assistance to thread over B/L LE's and complete clothing management task     Toileting Assistance  3  Moderate Assistance   Toileting Deficit Perineal hygiene;Clothing management up;Clothing management down  (pericare for thoroughness, min a to manage underwear)   Bed Mobility   Supine to Sit 4  Minimal assistance   Additional items Assist x 1;HOB elevated   Transfers   Sit to Stand 2  Maximal assistance   Additional items Assist x 1   Stand to Sit 3  Moderate assistance   Additional items Assist x 1   Toilet transfer 2  Maximal assistance   Additional items Assist x 1  (stand pitvot sit from bed>bedside commode)   Additional Comments pt progressed with [place] level with transfers first trial sit to stand max a, 2nd min a , 3rd with LB dressing S with bed elevated  pt reports first sit to stand stifffness limits independence  Functional Mobility   Functional Mobility 3  Moderate assistance   Additional Comments mod a x 1 2 small steps with RW from commode to bed  pt declines further ambulation 2* to BLE pain   Additional items Rolling walker   Toilet Transfers   Toilet Transfer Type To and from   Toilet Transfer to Standard bedside commode   Toilet Transfer Technique Stand pivot   Toilet Transfers Maximal assistance   Toilet Transfers Comments stand pivot sit without AD, improved transfer I with RW use  Cognition   Arousal/Participation Alert; Cooperative   Attention Attends with cues to redirect   Orientation Level Oriented X4   Memory   (improved recall of safe transfer technique post education, pt able to carryover throughout session  )   Following Commands Follows multistep commands with increased time or repetition   Comments pt with improved cognition this am, improved memory/attention, thought process-able to converse appropriately with improved speech  Activity Tolerance   Activity Tolerance Patient limited by fatigue;Patient limited by pain, pt reported dizziness at end of treatment session with  /80, SP02 100%, HR 82 -physician entering room notified  RN aware of pt's aniexty and request for antianxiety medication  Medical Staff Made Aware RN cleared pt for therapy   Assessment   Assessment Patient participated in Skilled OT session this date with interventions consisting of self care tasks edge of bed, functional transfers-including bedside commode, toileting tasks-see assistance levels above   Patient agreeable to OT treatment session, upon arrival patient was found supine in bed and seated at edge of bed  In comparison to previous session, patient with improvements in self care tasks, transfers, cognition   Patient requiring frequent rest periods and ocassional safety reminders  Patient continues to be functioning below baseline level, occupational performance remains limited secondary to factors listed above and increased risk for falls and injury  From OT standpoint, recommendation at time of d/c would be Short Term Rehab  Patient to benefit from continued Occupational Therapy treatment while in the hospital to address deficits as defined above and maximize level of functional independence with ADLs and functional mobility  Plan   Treatment Interventions ADL retraining;Functional transfer training; Endurance training;Patient/family training;Equipment evaluation/education; Compensatory technique education; Activityengagement   Goal Expiration Date 03/24/20   OT Treatment Day 1   OT Frequency 3-5x/wk   Recommendation   OT Discharge Recommendation Short Term Rehab   OT - OK to Discharge Yes     Everardo Bansal MOT, OTR/L
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
## 2020-03-14 LAB
BACTERIA BLD CULT: NORMAL
BACTERIA BLD CULT: NORMAL

## 2020-03-14 PROCEDURE — 99232 SBSQ HOSP IP/OBS MODERATE 35: CPT | Performed by: INTERNAL MEDICINE

## 2020-03-14 RX ORDER — LANOLIN ALCOHOL/MO/W.PET/CERES
6 CREAM (GRAM) TOPICAL
Status: DISCONTINUED | OUTPATIENT
Start: 2020-03-14 | End: 2020-03-19 | Stop reason: HOSPADM

## 2020-03-14 RX ADMIN — DICLOFENAC 4 G: 10 GEL TOPICAL at 18:43

## 2020-03-14 RX ADMIN — PANTOPRAZOLE SODIUM 20 MG: 20 TABLET, DELAYED RELEASE ORAL at 05:16

## 2020-03-14 RX ADMIN — HYDROMORPHONE HYDROCHLORIDE 1 MG: 2 TABLET ORAL at 12:39

## 2020-03-14 RX ADMIN — OXYCODONE HYDROCHLORIDE 10 MG: 10 TABLET ORAL at 05:16

## 2020-03-14 RX ADMIN — LORATADINE 10 MG: 10 TABLET ORAL at 09:57

## 2020-03-14 RX ADMIN — PREGABALIN 100 MG: 100 CAPSULE ORAL at 22:55

## 2020-03-14 RX ADMIN — PROPRANOLOL HYDROCHLORIDE 20 MG: 20 TABLET ORAL at 18:42

## 2020-03-14 RX ADMIN — BUSPIRONE HYDROCHLORIDE 5 MG: 5 TABLET ORAL at 09:52

## 2020-03-14 RX ADMIN — OXYBUTYNIN CHLORIDE 5 MG: 5 TABLET ORAL at 18:43

## 2020-03-14 RX ADMIN — DULOXETINE HYDROCHLORIDE 60 MG: 60 CAPSULE, DELAYED RELEASE ORAL at 22:55

## 2020-03-14 RX ADMIN — BUSPIRONE HYDROCHLORIDE 5 MG: 5 TABLET ORAL at 18:45

## 2020-03-14 RX ADMIN — DICLOFENAC 4 G: 10 GEL TOPICAL at 22:56

## 2020-03-14 RX ADMIN — HEPARIN SODIUM 7500 UNITS: 5000 INJECTION INTRAVENOUS; SUBCUTANEOUS at 05:16

## 2020-03-14 RX ADMIN — BISACODYL 10 MG: 5 TABLET, COATED ORAL at 09:57

## 2020-03-14 RX ADMIN — OXYCODONE HYDROCHLORIDE 10 MG: 10 TABLET ORAL at 09:57

## 2020-03-14 RX ADMIN — PROPRANOLOL HYDROCHLORIDE 20 MG: 20 TABLET ORAL at 09:58

## 2020-03-14 RX ADMIN — DULOXETINE HYDROCHLORIDE 30 MG: 30 CAPSULE, DELAYED RELEASE ORAL at 09:52

## 2020-03-14 RX ADMIN — BUSPIRONE HYDROCHLORIDE 5 MG: 5 TABLET ORAL at 22:55

## 2020-03-14 RX ADMIN — LORAZEPAM 0.5 MG: 0.5 TABLET ORAL at 22:55

## 2020-03-14 RX ADMIN — LACTULOSE 20 G: 10 SOLUTION ORAL at 09:51

## 2020-03-14 RX ADMIN — QUETIAPINE FUMARATE 300 MG: 300 TABLET, EXTENDED RELEASE ORAL at 22:54

## 2020-03-14 RX ADMIN — PREGABALIN 100 MG: 100 CAPSULE ORAL at 18:43

## 2020-03-14 RX ADMIN — SENNOSIDES AND DOCUSATE SODIUM 1 TABLET: 8.6; 5 TABLET ORAL at 09:57

## 2020-03-14 RX ADMIN — PRAZOSIN HYDROCHLORIDE 2 MG: 2 CAPSULE ORAL at 09:58

## 2020-03-14 RX ADMIN — PREGABALIN 100 MG: 100 CAPSULE ORAL at 09:52

## 2020-03-14 RX ADMIN — MELATONIN 6 MG: at 22:55

## 2020-03-14 RX ADMIN — OXYBUTYNIN CHLORIDE 5 MG: 5 TABLET ORAL at 09:52

## 2020-03-14 RX ADMIN — MELOXICAM 15 MG: 7.5 TABLET ORAL at 09:52

## 2020-03-14 RX ADMIN — DICLOFENAC 4 G: 10 GEL TOPICAL at 09:57

## 2020-03-14 RX ADMIN — HEPARIN SODIUM 7500 UNITS: 5000 INJECTION INTRAVENOUS; SUBCUTANEOUS at 22:55

## 2020-03-14 RX ADMIN — HYDROMORPHONE HYDROCHLORIDE 1 MG: 2 TABLET ORAL at 20:43

## 2020-03-14 RX ADMIN — MELATONIN 1000 UNITS: at 09:52

## 2020-03-14 RX ADMIN — LIDOCAINE 1 PATCH: 50 PATCH TOPICAL at 22:56

## 2020-03-14 RX ADMIN — TRAZODONE HYDROCHLORIDE 150 MG: 100 TABLET ORAL at 22:55

## 2020-03-14 RX ADMIN — OXYCODONE HYDROCHLORIDE 10 MG: 10 TABLET ORAL at 18:43

## 2020-03-14 RX ADMIN — HEPARIN SODIUM 7500 UNITS: 5000 INJECTION INTRAVENOUS; SUBCUTANEOUS at 14:39

## 2020-03-14 RX ADMIN — DICLOFENAC 4 G: 10 GEL TOPICAL at 14:40

## 2020-03-14 RX ADMIN — LORAZEPAM 0.5 MG: 0.5 TABLET ORAL at 05:28

## 2020-03-14 RX ADMIN — AMLODIPINE BESYLATE 5 MG: 5 TABLET ORAL at 09:52

## 2020-03-14 RX ADMIN — HYDROXYZINE HYDROCHLORIDE 50 MG: 50 TABLET, FILM COATED ORAL at 22:55

## 2020-03-14 NOTE — PROGRESS NOTES
[facility] - Internal Medicine Progress Note      PATIENT INFORMATION      Patient: Angelique Espinoza 62 y o  female   MRN: [id number]  PCP: Esmer Larsen MD  Unit/Bed#: [facility] 822-01 Encounter: [id number]  Date Of Visit: 03/14/20  Current Length of Stay: 4 day(s)     ASSESSMENTS & PLAN        Principal Problem:    Intractable low back pain  Active Problems:    Chronic pain disorder    Bipolar II disorder ([address] )    Hypertension    Major depressive disorder, recurrent episode, moderate degree (HCC)    Migraine    Tardive dyskinesia      Tardive dyskinesia  Assessment & Plan  Patient currently on multiple psychiatric medications  Medication reconciliation is limited because patient does not know which medications she is taking  Patient was found to have tardive dyskinesia on physical exam   Will hold Seroquel, Klonopin and Lamictal   Call her pharmacy before initiating any medications  Migraine  Assessment & Plan  Admits to having her typical migraine headaches on presentation  Will continue home dose of Propanolol 20 mg b i d  Will give IV magnesium and Toradol 15 mg b i d     Major depressive disorder, recurrent episode, moderate degree (HCC)  Assessment & Plan  Will continue Cymbalta 90 mg total for history of depression  Will hold on to risperidone and quetiapine since patient has severe tardive dyskinesia on physical exam     Hypertension  Assessment & Plan  Will continue amlodipine 10 mg daily  Bipolar II disorder [facility])  Assessment & Plan  Call pharmacy before initiating any psychiatric medication  Patient needs close outpatient psychiatric follow-up  Will hold on to Klonopin, Lamictal, Seroquel, risperidone  Chronic pain disorder  Assessment & Plan  Patient with long history of chronic back pain  Follows up with Neurosurgery outpatient  Patient recently evaluated by neurosurgery for possible spinal stimulator    Admits to trying Lyrica, ibuprofen, Tylenol, Cymbalta and was recently prescribed morphine 15 mg b i d  (60 tablets total) by her PCP  Patient admits that none of the pain medications have helped her with her pain so far  * Intractable low back pain  Assessment & Plan  History of chronic low back pain and bilateral lower extremity pain  Patient came in today for worsening of her pain  Labs significant for CRP for 48 2, sed rate 16, alk phos 131  CT scan Lucency surrounding the superior pedicle screw on the right at T7 with paraspinal soft tissue thickening and endplate erosion   Findings suggest loosening and possible infection   Mild irregularity of endplates is seen at X7-6 and T7-8   The spinal canal is not well evaluated this level due to streak artifact versus any canal narrowing is not excluded  Lucency surrounds the left S1 pedicle screw which also suggests possible loosening and degenerative changes at L5-S1  Thoracic MRI limited, unable to rule out infectious etiology due to poor study    Lumbar MRI, limited however no gross pathologic processes  Plan:  · Pain control:  Oxycodone 5 mg Q 4 p r n  for moderate pain, oxycodone 10 mg Q 4 p r n  For severe pain and hydromorphone 1 mg q 6 p r n  For breakthrough pain  · Patient has extensive medication list   Call her pharmacy for medication reconciliation  Patient does not know her home medication list   · Neurosurgical outpatient follow-up for possible spinal stimulator   · Heating pad prn   · Lidocain patch at T12 paraspinal     Mild feverresolved as of 3/10/2020  Assessment & Plan  With fever of 102 8  Repeat temperature 100 6°  Down trending blood pressure but no episodes of hypotension  Patient denied any URI symptoms or urinary symptoms  Unclear source at this time  Blood  Cultures negative at 24 hours       Plan:  · Continue to monitor fever and WBC daily      VTE Pharmacologic Prophylaxis: Heparin   VTE Mechanical Prophylaxis: SCD's    Disposition: Await placement      SUBJECTIVE     Continues to complain about same intensity pain in back  Did not sleep well  Patient denies chest pain, palpitations, SOB, cough, abdominal pain, nausea, vomiting, constipation, diarrhea, fevers/chills, headaches, dysuria  OBJECTIVE     Vitals:   Temp (24hrs), Av 6 °F (37 °C), Min:98 2 °F (36 8 °C), Max:99 1 °F (37 3 °C)    Temp:  [98 2 °F (36 8 °C)-99 1 °F (37 3 °C)] 98 2 °F (36 8 °C)  HR:  [61-66] 62  Resp:  [16-20] 18  BP: (133-137)/(80-90) 137/90  SpO2:  [96 %-100 %] 96 %  Body mass index is 40 39 kg/m²  Input and Output Summary (last 24 hours): Intake/Output Summary (Last 24 hours) at 3/14/2020 1109  Last data filed at 3/14/2020 0706  Gross per 24 hour   Intake 680 ml   Output 750 ml   Net -70 ml       Physical Exam:   GENERAL: NAD  HEENT:  NC/AT, PERRL, EOMI, MMM, no scleral icterus  CARDIAC:  RRR, +S1/S2, no S3/S4 heard, no m/g/r  PULMONARY:  CTA B/L, no wheezing/rales/rhonci, non-labored breathing  ABDOMEN:  Soft, NT/ND, +BS, no rebound/guarding/rigidity  Extremities:  2+ Pulses in DP/PT  No edema, cyanosis, or clubbing  NEUROLOGIC:  Alert/oriented x3  No motor or sensory deficits  SKIN:  No rashes or erythema          ADDITIONAL DATA     Labs & Recent Cultures:     Results from last 7 days   Lab Units 03/10/20  0606   WBC Thousand/uL 2 79*   HEMOGLOBIN g/dL 12 2   HEMATOCRIT % 37 3   PLATELETS Thousands/uL 172   NEUTROS PCT % 43   LYMPHS PCT % 35   MONOS PCT % 16*   EOS PCT % 5     Results from last 7 days   Lab Units 03/10/20  0606  20  0555   POTASSIUM mmol/L 3 5   < > 2 9*   CHLORIDE mmol/L 110*   < > 100   CO2 mmol/L 24   < > 29   BUN mg/dL 6   < > 9   CREATININE mg/dL 0 56*   < > 0 89   CALCIUM mg/dL 8 4   < > 8 6   ALK PHOS U/L  --   --  120*   ALT U/L  --   --  16   AST U/L  --   --  19    < > = values in this interval not displayed  Results from last 7 days   Lab Units 20  0213   BLOOD CULTURE  No Growth After 5 Days  No Growth After 5 Days           Nutrition:  Diet Regular; Regular House  Radiology Results:   MRI lumbar spine w wo contrast   Final Result by Ella Tapia MD (03/12 1344)      Markedly limited exam owing to extensive surgical hardware  Enhancement and edema which occur within the vertebral bodies and disc at the T6-T7 level are nonspecific  There is no paraspinal phlegmon  This is favored to be degenerative in nature  However, serologic exclusion suggested with ESR, CRP and WBC  There does not appear to be significant lumbar spine canal stenosis  Workstation performed: [id number]         MRI thoracic spine wo contrast   Final Result by Ella Tapia MD (03/10 0800)      Markedly limited exam of the thoracic spine owing to motion and ferromagnetic posterior fixation hardware  Patient is fused from  T7 to the  Solitary, right T7 screw is loose based on CT  Although discitis osteomyelitis at T6-T7 cannot be excluded    with certainty, this is the transition point where one would expect significant degenerative changes at the margin between the relatively mobile spine and  the long fixed segment extending to the sacrum  Workstation performed: [id number]         CT spine lumbar wo contrast   Final Result by  (03/14 1109)   Addendum 1 of 1 by Mirna Jolly MD (03/08 2135)   ADDENDUM:      There is a typo in the conclusion  The 4th sentence should read "the    spinal canal is not well evaluated at this level (T7) due to streak    artifact; consequently, soft tissue canal narrowing is not excluded "        Clinical correlation with any neurologic signs including lower extremity    weakness may be useful  Final      CT spine thoracic wo contrast   Final Result by  (03/14 1109)   Addendum 1 of 1 by Mirna Jolly MD (03/08 2135)   ADDENDUM:      There is a typo in the conclusion    The 4th sentence should read "the    spinal canal is not well evaluated at this level (T7) due to streak    artifact; consequently, soft tissue canal narrowing is not excluded "        Clinical correlation with any neurologic signs including lower extremity    weakness may be useful        Final        Scheduled Medications:    amLODIPine 5 mg Daily   bisacodyl 10 mg Daily   busPIRone 5 mg TID   cholecalciferol 1,000 Units Daily   diclofenac sodium 4 g 4x Daily   DULoxetine 30 mg Daily   DULoxetine 60 mg HS   heparin (porcine) 7,500 Units Q8H [address]   hydrOXYzine HCL 50 mg HS   lactulose 20 g Daily   lidocaine 1 patch HS   loratadine 10 mg Daily   melatonin 6 mg HS   meloxicam 15 mg Daily   oxybutynin 5 mg BID   pantoprazole 20 mg Early Morning   prazosin 2 mg Daily   pregabalin 100 mg TID   propranolol 20 mg BID   QUEtiapine 300 mg HS   senna-docusate sodium 1 tablet Daily   traZODone 150 mg HS       PRN MEDS:    cyclobenzaprine 5 mg TID PRN   HYDROmorphone 1 mg Q6H PRN   LORazepam 0 5 mg Q8H PRN   ondansetron 4 mg Q6H PRN   oxyCODONE 10 mg Q4H PRN   oxyCODONE 5 mg Q4H PRN       Last 24 Hours Medication List:     Current Facility-Administered Medications:  amLODIPine 5 mg Oral Daily Marisa Delgadillo MD   bisacodyl 10 mg Oral Daily Marisa Delgadillo MD   busPIRone 5 mg Oral TID Sapna Mckee MD   cholecalciferol 1,000 Units Oral Daily Valdo Xavier MD   cyclobenzaprine 5 mg Oral TID PRN Marisa Delgadillo MD   diclofenac sodium 4 g Topical 4x Daily Nayla Barahona MD   DULoxetine 30 mg Oral Daily Valdo Xavier MD   DULoxetine 60 mg Oral HS Marisa Delgadillo MD   heparin (porcine) 7,500 Units Subcutaneous [facility] Marisa Delgadillo MD   HYDROmorphone 1 mg Oral Q6H PRN Valdo Xavier MD   hydrOXYzine HCL 50 mg Oral HS Nayla Barahona MD   lactulose 20 g Oral Daily Nayla Barahona MD   lidocaine 1 patch Topical HS Lily Xavier MD   loratadine 10 mg Oral Daily Valdo Xavier MD   LORazepam 0 5 mg Oral Q8H PRN Mone Hammer DO   melatonin 6 mg Oral HS Lily Xavier MD   meloxicam 15 mg Oral Daily Valdo Xavier MD   ondansetron 4 mg Intravenous Q6H PRN Valdo Xavier MD   oxybutynin 5 mg Oral BID Sapna Mckee MD oxyCODONE 10 mg Oral Q4H PRN Valdo Xavier MD   oxyCODONE 5 mg Oral Q4H PRN Valdo Xavier MD   pantoprazole 20 mg Oral Early Morning Calvin Blanco MD   prazosin 2 mg Oral Daily Calvin Blanco MD   pregabalin 100 mg Oral TID Jacky Neri MD   propranolol 20 mg Oral BID Valdo Xavier MD   QUEtiapine 300 mg Oral HS Calvin Blanco MD   senna-docusate sodium 1 tablet Oral Daily Valdo Xavier MD   traZODone 150 mg Oral HS Sonia Hays DO          Time Spent for Care: 30 mins spent in total   More than 50% of total time spent on counseling and coordination of care as described above  Current Length of Stay: 4 day(s)      Code Status: Level 1 - Full Code          ** Please Note: This note is constructed using a voice recognition dictation system   **

## 2020-03-15 PROCEDURE — 99232 SBSQ HOSP IP/OBS MODERATE 35: CPT | Performed by: INTERNAL MEDICINE

## 2020-03-15 RX ADMIN — MELATONIN 1000 UNITS: at 08:57

## 2020-03-15 RX ADMIN — QUETIAPINE FUMARATE 300 MG: 300 TABLET, EXTENDED RELEASE ORAL at 22:36

## 2020-03-15 RX ADMIN — DULOXETINE HYDROCHLORIDE 30 MG: 30 CAPSULE, DELAYED RELEASE ORAL at 08:56

## 2020-03-15 RX ADMIN — OXYCODONE HYDROCHLORIDE 10 MG: 10 TABLET ORAL at 16:45

## 2020-03-15 RX ADMIN — MELATONIN 6 MG: at 22:33

## 2020-03-15 RX ADMIN — HYDROMORPHONE HYDROCHLORIDE 1 MG: 2 TABLET ORAL at 09:08

## 2020-03-15 RX ADMIN — HEPARIN SODIUM 7500 UNITS: 5000 INJECTION INTRAVENOUS; SUBCUTANEOUS at 22:33

## 2020-03-15 RX ADMIN — HEPARIN SODIUM 7500 UNITS: 5000 INJECTION INTRAVENOUS; SUBCUTANEOUS at 06:43

## 2020-03-15 RX ADMIN — TRAZODONE HYDROCHLORIDE 150 MG: 100 TABLET ORAL at 22:33

## 2020-03-15 RX ADMIN — AMLODIPINE BESYLATE 5 MG: 5 TABLET ORAL at 09:00

## 2020-03-15 RX ADMIN — SENNOSIDES AND DOCUSATE SODIUM 1 TABLET: 8.6; 5 TABLET ORAL at 08:57

## 2020-03-15 RX ADMIN — BUSPIRONE HYDROCHLORIDE 5 MG: 5 TABLET ORAL at 22:33

## 2020-03-15 RX ADMIN — ONDANSETRON 4 MG: 2 INJECTION INTRAMUSCULAR; INTRAVENOUS at 12:29

## 2020-03-15 RX ADMIN — PROPRANOLOL HYDROCHLORIDE 20 MG: 20 TABLET ORAL at 09:00

## 2020-03-15 RX ADMIN — OXYCODONE HYDROCHLORIDE 10 MG: 10 TABLET ORAL at 00:23

## 2020-03-15 RX ADMIN — OXYBUTYNIN CHLORIDE 5 MG: 5 TABLET ORAL at 08:57

## 2020-03-15 RX ADMIN — HYDROXYZINE HYDROCHLORIDE 50 MG: 50 TABLET, FILM COATED ORAL at 22:33

## 2020-03-15 RX ADMIN — LIDOCAINE 1 PATCH: 50 PATCH TOPICAL at 22:35

## 2020-03-15 RX ADMIN — BUSPIRONE HYDROCHLORIDE 5 MG: 5 TABLET ORAL at 08:57

## 2020-03-15 RX ADMIN — OXYBUTYNIN CHLORIDE 5 MG: 5 TABLET ORAL at 18:43

## 2020-03-15 RX ADMIN — DICLOFENAC 4 G: 10 GEL TOPICAL at 22:36

## 2020-03-15 RX ADMIN — PROPRANOLOL HYDROCHLORIDE 20 MG: 20 TABLET ORAL at 18:45

## 2020-03-15 RX ADMIN — PANTOPRAZOLE SODIUM 20 MG: 20 TABLET, DELAYED RELEASE ORAL at 06:43

## 2020-03-15 RX ADMIN — BISACODYL 10 MG: 5 TABLET, COATED ORAL at 08:57

## 2020-03-15 RX ADMIN — MELOXICAM 15 MG: 7.5 TABLET ORAL at 08:56

## 2020-03-15 RX ADMIN — DULOXETINE HYDROCHLORIDE 60 MG: 60 CAPSULE, DELAYED RELEASE ORAL at 22:33

## 2020-03-15 RX ADMIN — LACTULOSE 20 G: 10 SOLUTION ORAL at 08:57

## 2020-03-15 RX ADMIN — HEPARIN SODIUM 7500 UNITS: 5000 INJECTION INTRAVENOUS; SUBCUTANEOUS at 16:43

## 2020-03-15 RX ADMIN — LORATADINE 10 MG: 10 TABLET ORAL at 08:56

## 2020-03-15 RX ADMIN — PRAZOSIN HYDROCHLORIDE 2 MG: 2 CAPSULE ORAL at 23:52

## 2020-03-15 RX ADMIN — PREGABALIN 100 MG: 100 CAPSULE ORAL at 08:56

## 2020-03-15 RX ADMIN — OXYCODONE HYDROCHLORIDE 10 MG: 10 TABLET ORAL at 23:35

## 2020-03-15 RX ADMIN — DICLOFENAC 4 G: 10 GEL TOPICAL at 08:59

## 2020-03-15 RX ADMIN — BUSPIRONE HYDROCHLORIDE 5 MG: 5 TABLET ORAL at 16:43

## 2020-03-15 RX ADMIN — PREGABALIN 100 MG: 100 CAPSULE ORAL at 22:33

## 2020-03-15 RX ADMIN — OXYCODONE HYDROCHLORIDE 10 MG: 10 TABLET ORAL at 06:44

## 2020-03-15 RX ADMIN — PREGABALIN 100 MG: 100 CAPSULE ORAL at 16:43

## 2020-03-15 NOTE — PROGRESS NOTES
INTERNAL MEDICINE RESIDENCY PROGRESS NOTE     Name: Kathy Santillan   Age & Sex: 62 y o  female   MRN: [id number]  Unit/Bed#: [address] [address] 822-01   Encounter: [id number]  Team: SOD Team A    PATIENT INFORMATION     Name: Kathy Santillan   Age & Sex: 62 y o  female   MRN: [id number]  Hospital Stay Days: 5    ASSESSMENT/PLAN     Principal Problem:    Intractable low back pain  Active Problems:    Chronic pain disorder    Major depressive disorder, recurrent episode, moderate degree ([facility])    Bipolar II disorder ([facility])    Hypertension    Migraine    Tardive dyskinesia      * Intractable low back pain  Assessment & Plan  History of chronic low back pain and bilateral lower extremity pain  Patient came in today for worsening of her pain  Labs significant for CRP for 48 2, sed rate 16, alk phos 131  CT scan Lucency surrounding the superior pedicle screw on the right at T7 with paraspinal soft tissue thickening and endplate erosion   Findings suggest loosening and possible infection   Mild irregularity of endplates is seen at B3-4 and T7-8   The spinal canal is not well evaluated this level due to streak artifact versus any canal narrowing is not excluded  Lucency surrounds the left S1 pedicle screw which also suggests possible loosening and degenerative changes at L5-S1  Thoracic MRI limited, unable to rule out infectious etiology due to poor study    Lumbar MRI, limited however no gross pathologic processes  Plan:  · Pain control:  Oxycodone 5 mg Q 4 p r n  for moderate pain, oxycodone 10 mg Q 4 p r n  For severe pain and hydromorphone 1 mg q 6 p r n  For breakthrough pain  · Patient has extensive medication list   Call her pharmacy for medication reconciliation    Patient does not know her home medication list   · Neurosurgical outpatient follow-up for possible spinal stimulator   · Heating pad prn   · Lidocain patch at T12 paraspinal     Major depressive disorder, recurrent episode, moderate degree ([facility])  Assessment & Plan  Will continue Cymbalta 90 mg total for history of depression  Will hold on to risperidone and quetiapine since patient has severe tardive dyskinesia on physical exam     Chronic pain disorder  Assessment & Plan  Patient with long history of chronic back pain  Follows up with Neurosurgery outpatient  Patient recently evaluated by neurosurgery for possible spinal stimulator  Admits to trying Lyrica, ibuprofen, Tylenol, Cymbalta and was recently prescribed morphine 15 mg b i d  (60 tablets total) by her PCP  Patient admits that none of the pain medications have helped her with her pain so far  Mild feverresolved as of 3/10/2020  Assessment & Plan  With fever of 102 8  Repeat temperature 100 6°  Down trending blood pressure but no episodes of hypotension  Patient denied any URI symptoms or urinary symptoms  Unclear source at this time  Blood  Cultures negative at 24 hours  Plan:  · Continue to monitor fever and WBC daily    Tardive dyskinesia  Assessment & Plan  Patient currently on multiple psychiatric medications  Medication reconciliation is limited because patient does not know which medications she is taking  Patient was found to have tardive dyskinesia on physical exam   Will hold Seroquel, Klonopin and Lamictal   Call her pharmacy before initiating any medications  Migraine  Assessment & Plan  Admits to having her typical migraine headaches on presentation  Will continue home dose of Propanolol 20 mg b i d  Will give IV magnesium and Toradol 15 mg b i d  Hypertension  Assessment & Plan  Will continue amlodipine 10 mg daily  Bipolar II disorder [facility])  Assessment & Plan  Call pharmacy before initiating any psychiatric medication  Patient needs close outpatient psychiatric follow-up  Will hold on to Klonopin, Lamictal, Seroquel, risperidone  Disposition: Medically stable for discharge; pending placement      SUBJECTIVE     Patient seen and examined   No acute events overnight  Patient is sitting on edge of bed, eating breakfast  She states pain is well controlled and has been moving around a bit more  Patient has no new symptoms  OBJECTIVE     Vitals:    20 0706 20 1503 20 2325 03/15/20 0722   BP: 137/90 140/88 129/79 124/77   Pulse: 62 71 62 66   Resp: 18 20 16 16   Temp: 98 2 °F (36 8 °C) 98 5 °F (36 9 °C) 98 2 °F (36 8 °C) 97 6 °F (36 4 °C)   TempSrc:       SpO2: 96% 98% 95% 97%   Weight:       Height:          Temperature:   Temp (24hrs), Av 1 °F (36 7 °C), Min:97 6 °F (36 4 °C), Max:98 5 °F (36 9 °C)    Temperature: 97 6 °F (36 4 °C)  Intake & Output:  I/O        07 -  0700  07 - 03/15 0700 03/15 07 -  0700    P  O  860 660     I V  (mL/kg) 965 (10 3) 3120 (33 3)     Total Intake(mL/kg) 1825 (19 5) 3780 (40 3)     Urine (mL/kg/hr) 350 (0 2) 950 (0 4)     Stool  0     Total Output 350 950     Net +1475 +2830            Unmeasured Urine Occurrence 6 x 2 x     Unmeasured Stool Occurrence 2 x 1 x         Weights:   IBW: 45 5 kg    Body mass index is 40 39 kg/m²  Weight (last 2 days)     None        Physical Exam   Constitutional: She is oriented to person, place, and time  She appears well-developed and well-nourished  HENT:   Mouth/Throat: Oropharynx is clear and moist    Eyes: Conjunctivae are normal  No scleral icterus  Neck: No JVD present  Cardiovascular: Normal rate, regular rhythm and normal heart sounds  Pulmonary/Chest: Effort normal and breath sounds normal    Abdominal: Soft  Bowel sounds are normal  She exhibits no distension  Musculoskeletal: She exhibits no edema or tenderness  Neurological: She is alert and oriented to person, place, and time  No cranial nerve deficit or sensory deficit  She exhibits normal muscle tone  Nursing note and vitals reviewed  LABORATORY DATA     Labs: I have personally reviewed pertinent reports    Results from last 7 days   Lab Units 03/10/20  0606 20  7124 03/09/20  0213 03/08/20  2129   WBC Thousand/uL 2 79* 4 99  --  5 56   HEMOGLOBIN g/dL 12 2 14 2  --  14 6   HEMATOCRIT % 37 3 43 1  --  43 3   PLATELETS Thousands/uL 172 219 238 243   NEUTROS PCT % 43  --   --  86*   MONOS PCT % 16*  --   --  7      Results from last 7 days   Lab Units 03/10/20  0606 03/09/20  1551 03/09/20  0555 03/08/20  2129   POTASSIUM mmol/L 3 5 4 1 2 9* 3 1*   CHLORIDE mmol/L 110* 109* 100 100   CO2 mmol/L 24 27 29 28   BUN mg/dL 6 7 9 10   CREATININE mg/dL 0 56* 0 72 0 89 0 82   CALCIUM mg/dL 8 4 8 4 8 6 8 8   ALK PHOS U/L  --   --  120* 131*   ALT U/L  --   --  16 12   AST U/L  --   --  19 14     Results from last 7 days   Lab Units 03/09/20  0555   MAGNESIUM mg/dL 2 5              Results from last 7 days   Lab Units 03/09/20  0229   LACTIC ACID mmol/L 1 3           IMAGING & DIAGNOSTIC TESTING     Radiology Results: I have personally reviewed pertinent reports  Ct Spine Thoracic Wo Contrast    Addendum Date: 3/8/2020    ADDENDUM: There is a typo in the conclusion  The 4th sentence should read "the spinal canal is not well evaluated at this level (T7) due to streak artifact; consequently, soft tissue canal narrowing is not excluded "  Clinical correlation with any neurologic signs including lower extremity weakness may be useful  Result Date: 3/8/2020  Impression: Lucency surrounding the superior pedicle screw on the right at T7 with paraspinal soft tissue thickening and endplate erosion  Findings suggest loosening and possible infection  Mild irregularity of endplates is seen at P1-9 and T7-8  The spinal canal  is not well evaluated this level due to streak artifact versus any canal narrowing is not excluded  Some endplate irregularity is seen at L5-S1 with also sclerosis which is more likely degenerative although some inflammatory component is not excluded  Lucency surrounds the left S1 pedicle screw which also suggests possible loosening    The right screw appears to pass into the right S1 foramen  Streak artifact does limit this study making assessment of canal narrowing difficult elsewhere  Possible erosion involving the right facet of L3-L4  Targeted MRI of the thoracic spine still may be useful with contrast to assess for any new marrow edema as compared to January as well as as well as any new canal involvement but may be limited due to streak artifact  Nuclear medicine study may also be useful  If prior CTs of the spine postoperatively are available that would be very helpful and an addendum could be rendered  Results were discussed with Dr Sindi Frey in the emergency room Workstation performed: [id number]     Ct Spine Lumbar Wo Contrast    Addendum Date: 3/8/2020    ADDENDUM: There is a typo in the conclusion  The 4th sentence should read "the spinal canal is not well evaluated at this level (T7) due to streak artifact; consequently, soft tissue canal narrowing is not excluded "  Clinical correlation with any neurologic signs including lower extremity weakness may be useful  Result Date: 3/8/2020  Impression: Lucency surrounding the superior pedicle screw on the right at T7 with paraspinal soft tissue thickening and endplate erosion  Findings suggest loosening and possible infection  Mild irregularity of endplates is seen at R1-1 and T7-8  The spinal canal  is not well evaluated this level due to streak artifact versus any canal narrowing is not excluded  Some endplate irregularity is seen at L5-S1 with also sclerosis which is more likely degenerative although some inflammatory component is not excluded  Lucency surrounds the left S1 pedicle screw which also suggests possible loosening  The right screw appears to pass into the right S1 foramen  Streak artifact does limit this study making assessment of canal narrowing difficult elsewhere  Possible erosion involving the right facet of L3-L4   Targeted MRI of the thoracic spine still may be useful with contrast to assess for any new marrow edema as compared to January as well as as well as any new canal involvement but may be limited due to streak artifact  Nuclear medicine study may also be useful  If prior CTs of the spine postoperatively are available that would be very helpful and an addendum could be rendered  Results were discussed with Dr Brina Guerrero in the emergency room Workstation performed: [id number]     Mri Thoracic Spine Wo Contrast    Result Date: 3/10/2020  Impression: Markedly limited exam of the thoracic spine owing to motion and ferromagnetic posterior fixation hardware  Patient is fused from  T7 to the  Solitary, right T7 screw is loose based on CT  Although discitis osteomyelitis at T6-T7 cannot be excluded with certainty, this is the transition point where one would expect significant degenerative changes at the margin between the relatively mobile spine and  the long fixed segment extending to the sacrum  Workstation performed: [id number]     Mri Lumbar Spine W Wo Contrast    Result Date: 3/12/2020  Impression: Markedly limited exam owing to extensive surgical hardware  Enhancement and edema which occur within the vertebral bodies and disc at the T6-T7 level are nonspecific  There is no paraspinal phlegmon  This is favored to be degenerative in nature  However, serologic exclusion suggested with ESR, CRP and WBC  There does not appear to be significant lumbar spine canal stenosis  Workstation performed: [id number]     Other Diagnostic Testing: I have personally reviewed pertinent reports      ACTIVE MEDICATIONS     Current Facility-Administered Medications   Medication Dose Route Frequency    amLODIPine (NORVASC) tablet 5 mg  5 mg Oral Daily    bisacodyl (DULCOLAX) EC tablet 10 mg  10 mg Oral Daily    busPIRone (BUSPAR) tablet 5 mg  5 mg Oral TID    cholecalciferol (VITAMIN D3) tablet 1,000 Units  1,000 Units Oral Daily    cyclobenzaprine (FLEXERIL) tablet 5 mg  5 mg Oral TID PRN    diclofenac sodium (VOLTAREN) 1 % topical gel 4 g  4 g Topical 4x Daily    DULoxetine (CYMBALTA) delayed release capsule 30 mg  30 mg Oral Daily    DULoxetine (CYMBALTA) delayed release capsule 60 mg  60 mg Oral HS    heparin (porcine) subcutaneous injection 7,500 Units  7,500 Units Subcutaneous Q8H [facility]    HYDROmorphone (DILAUDID) tablet 1 mg  1 mg Oral Q6H PRN    hydrOXYzine HCL (ATARAX) tablet 50 mg  50 mg Oral HS    lactulose 20 g/30 mL oral solution 20 g  20 g Oral Daily    lidocaine (LIDODERM) 5 % patch 1 patch  1 patch Topical HS    loratadine (CLARITIN) tablet 10 mg  10 mg Oral Daily    LORazepam (ATIVAN) tablet 0 5 mg  0 5 mg Oral Q8H PRN    melatonin tablet 6 mg  6 mg Oral HS    meloxicam (MOBIC) tablet 15 mg  15 mg Oral Daily    ondansetron (ZOFRAN) injection 4 mg  4 mg Intravenous Q6H PRN    oxybutynin (DITROPAN) tablet 5 mg  5 mg Oral BID    oxyCODONE (ROXICODONE) immediate release tablet 10 mg  10 mg Oral Q4H PRN    oxyCODONE (ROXICODONE) IR tablet 5 mg  5 mg Oral Q4H PRN    pantoprazole (PROTONIX) EC tablet 20 mg  20 mg Oral Early Morning    prazosin (MINIPRESS) capsule 2 mg  2 mg Oral Daily    pregabalin (LYRICA) capsule 100 mg  100 mg Oral TID    propranolol (INDERAL) tablet 20 mg  20 mg Oral BID    QUEtiapine (SEROquel XR) 24 hr tablet 300 mg  300 mg Oral HS    senna-docusate sodium (SENOKOT S) 8 6-50 mg per tablet 1 tablet  1 tablet Oral Daily    traZODone (DESYREL) tablet 150 mg  150 mg Oral HS       VTE Pharmacologic Prophylaxis: Heparin  VTE Mechanical Prophylaxis: sequential compression device    Portions of the record may have been created with voice recognition software  Occasional wrong word or "sound a like" substitutions may have occurred due to the inherent limitations of voice recognition software    Read the chart carefully and recognize, using context, where substitutions have occurred   ==  Corinne Yan MD  [address] [facility]  Internal Medicine Residency PGY-1

## 2020-03-15 NOTE — PLAN OF CARE
Problem: Potential for Falls  Goal: Patient will remain free of falls  Description  INTERVENTIONS:  - Assess patient frequently for physical needs  -  Identify cognitive and physical deficits and behaviors that affect risk of falls    -  [place] fall precautions as indicated by assessment   - Educate patient/family on patient safety including physical limitations  - Instruct patient to call for assistance with activity based on assessment  - Modify environment to reduce risk of injury  - Consider OT/PT consult to assist with strengthening/mobility  Outcome: Progressing     Problem: Prexisting or High Potential for Compromised Skin Integrity  Goal: Skin integrity is maintained or improved  Description  INTERVENTIONS:  - Identify patients at risk for skin breakdown  - Assess and monitor skin integrity  - Assess and monitor nutrition and hydration status  - Monitor labs   - Assess for incontinence   - Turn and reposition patient  - Assist with mobility/ambulation  - Relieve pressure over bony prominences  - Avoid friction and shearing  - Provide appropriate hygiene as needed including keeping skin clean and dry  - Evaluate need for skin moisturizer/barrier cream  - Collaborate with interdisciplinary team   - Patient/family teaching  - Consider wound care consult   Outcome: Progressing     Problem: PAIN - ADULT  Goal: Verbalizes/displays adequate comfort level or baseline comfort level  Description  Interventions:  - Encourage patient to monitor pain and request assistance  - Assess pain using appropriate pain scale  - Administer analgesics based on type and severity of pain and evaluate response  - Implement non-pharmacological measures as appropriate and evaluate response  - Consider cultural and social influences on pain and pain management  - Notify physician/advanced practitioner if interventions unsuccessful or patient reports new pain  Outcome: Progressing     Problem: INFECTION - ADULT  Goal: Absence or prevention of progression during hospitalization  Description  INTERVENTIONS:  - Assess and monitor for signs and symptoms of infection  - Monitor lab/diagnostic results  - Monitor all insertion sites, i e  indwelling lines, tubes, and drains  - Monitor endotracheal if appropriate and nasal secretions for changes in amount and color  - [place] appropriate cooling/warming therapies per order  - Administer medications as ordered  - Instruct and encourage patient and family to use good hand hygiene technique  - Identify and instruct in appropriate isolation precautions for identified infection/condition  Outcome: Progressing  Goal: Absence of fever/infection during neutropenic period  Description  INTERVENTIONS:  - Monitor WBC    Outcome: Progressing     Problem: SAFETY ADULT  Goal: Patient will remain free of falls  Description  INTERVENTIONS:  - Assess patient frequently for physical needs  -  Identify cognitive and physical deficits and behaviors that affect risk of falls    -  [place] fall precautions as indicated by assessment   - Educate patient/family on patient safety including physical limitations  - Instruct patient to call for assistance with activity based on assessment  - Modify environment to reduce risk of injury  - Consider OT/PT consult to assist with strengthening/mobility  Outcome: Progressing  Goal: Maintain or return to baseline ADL function  Description  INTERVENTIONS:  -  Assess patient's ability to carry out ADLs; assess patient's baseline for ADL function and identify physical deficits which impact ability to perform ADLs (bathing, care of mouth/teeth, toileting, grooming, dressing, etc )  - Assess/evaluate cause of self-care deficits   - Assess range of motion  - Assess patient's mobility; develop plan if impaired  - Assess patient's need for assistive devices and provide as appropriate  - Encourage maximum independence but intervene and supervise when necessary  - Involve family in performance of ADLs  - Assess for home care needs following discharge   - Consider OT consult to assist with ADL evaluation and planning for discharge  - Provide patient education as appropriate  Outcome: Progressing  Goal: Maintain or return mobility status to optimal level  Description  INTERVENTIONS:  - Assess patient's baseline mobility status (ambulation, transfers, stairs, etc )    - Identify cognitive and physical deficits and behaviors that affect mobility  - Identify mobility aids required to assist with transfers and/or ambulation (gait belt, sit-to-stand, lift, walker, cane, etc )  - [place] fall precautions as indicated by assessment  - Record patient progress and toleration of activity level on Mobility SBAR; progress patient to next Phase/Stage  - Instruct patient to call for assistance with activity based on assessment  - Consider rehabilitation consult to assist with strengthening/weightbearing, etc   Outcome: Progressing     Problem: DISCHARGE PLANNING  Goal: Discharge to home or other facility with appropriate resources  Description  INTERVENTIONS:  - Identify barriers to discharge w/patient and caregiver  - Arrange for needed discharge resources and transportation as appropriate  - Identify discharge learning needs (meds, wound care, etc )  - Arrange for interpretive services to assist at discharge as needed  - Refer to Case Management Department for coordinating discharge planning if the patient needs post-hospital services based on physician/advanced practitioner order or complex needs related to functional status, cognitive ability, or social support system  Outcome: Progressing     Problem: Knowledge Deficit  Goal: Patient/family/caregiver demonstrates understanding of disease process, treatment plan, medications, and discharge instructions  Description  Complete learning assessment and assess knowledge base    Interventions:  - Provide teaching at level of understanding  - Provide teaching via preferred learning methods  Outcome: Progressing     Problem: SKIN/TISSUE INTEGRITY - ADULT  Goal: Skin integrity remains intact  Description  INTERVENTIONS  - Identify patients at risk for skin breakdown  - Assess and monitor skin integrity  - Assess and monitor nutrition and hydration status  - Monitor labs (i e  albumin)  - Assess for incontinence   - Turn and reposition patient  - Assist with mobility/ambulation  - Relieve pressure over bony prominences  - Avoid friction and shearing  - Provide appropriate hygiene as needed including keeping skin clean and dry  - Evaluate need for skin moisturizer/barrier cream  - Collaborate with interdisciplinary team (i e  Nutrition, Rehabilitation, etc )   - Patient/family teaching  Outcome: Progressing     Problem: MUSCULOSKELETAL - ADULT  Goal: Maintain or return mobility to safest level of function  Description  INTERVENTIONS:  - Assess patient's ability to carry out ADLs; assess patient's baseline for ADL function and identify physical deficits which impact ability to perform ADLs (bathing, care of mouth/teeth, toileting, grooming, dressing, etc )  - Assess/evaluate cause of self-care deficits   - Assess range of motion  - Assess patient's mobility  - Assess patient's need for assistive devices and provide as appropriate  - Encourage maximum independence but intervene and supervise when necessary  - Involve family in performance of ADLs  - Assess for home care needs following discharge   - Consider OT consult to assist with ADL evaluation and planning for discharge  - Provide patient education as appropriate  Outcome: Progressing  Goal: Maintain proper alignment of affected body part  Description  INTERVENTIONS:  - Support, maintain and protect limb and body alignment  - Provide patient/ family with appropriate education  Outcome: Progressing

## 2020-03-15 NOTE — PLAN OF CARE
Problem: Potential for Falls  Goal: Patient will remain free of falls  Description  INTERVENTIONS:  - Assess patient frequently for physical needs  -  Identify cognitive and physical deficits and behaviors that affect risk of falls    -  Grant fall precautions as indicated by assessment   - Educate patient/family on patient safety including physical limitations  - Instruct patient to call for assistance with activity based on assessment  - Modify environment to reduce risk of injury  - Consider OT/PT consult to assist with strengthening/mobility  Outcome: Progressing     Problem: Prexisting or High Potential for Compromised Skin Integrity  Goal: Skin integrity is maintained or improved  Description  INTERVENTIONS:  - Identify patients at risk for skin breakdown  - Assess and monitor skin integrity  - Assess and monitor nutrition and hydration status  - Monitor labs   - Assess for incontinence   - Turn and reposition patient  - Assist with mobility/ambulation  - Relieve pressure over bony prominences  - Avoid friction and shearing  - Provide appropriate hygiene as needed including keeping skin clean and dry  - Evaluate need for skin moisturizer/barrier cream  - Collaborate with interdisciplinary team   - Patient/family teaching  - Consider wound care consult   Outcome: Progressing     Problem: PAIN - ADULT  Goal: Verbalizes/displays adequate comfort level or baseline comfort level  Description  Interventions:  - Encourage patient to monitor pain and request assistance  - Assess pain using appropriate pain scale  - Administer analgesics based on type and severity of pain and evaluate response  - Implement non-pharmacological measures as appropriate and evaluate response  - Consider cultural and social influences on pain and pain management  - Notify physician/advanced practitioner if interventions unsuccessful or patient reports new pain  Outcome: Progressing     Problem: INFECTION - ADULT  Goal: Absence or prevention of progression during hospitalization  Description  INTERVENTIONS:  - Assess and monitor for signs and symptoms of infection  - Monitor lab/diagnostic results  - Monitor all insertion sites, i e  indwelling lines, tubes, and drains  - Monitor endotracheal if appropriate and nasal secretions for changes in amount and color  - [place] appropriate cooling/warming therapies per order  - Administer medications as ordered  - Instruct and encourage patient and family to use good hand hygiene technique  - Identify and instruct in appropriate isolation precautions for identified infection/condition  Outcome: Progressing  Goal: Absence of fever/infection during neutropenic period  Description  INTERVENTIONS:  - Monitor WBC    Outcome: Progressing     Problem: SAFETY ADULT  Goal: Patient will remain free of falls  Description  INTERVENTIONS:  - Assess patient frequently for physical needs  -  Identify cognitive and physical deficits and behaviors that affect risk of falls    -  [place] fall precautions as indicated by assessment   - Educate patient/family on patient safety including physical limitations  - Instruct patient to call for assistance with activity based on assessment  - Modify environment to reduce risk of injury  - Consider OT/PT consult to assist with strengthening/mobility  Outcome: Progressing  Goal: Maintain or return to baseline ADL function  Description  INTERVENTIONS:  -  Assess patient's ability to carry out ADLs; assess patient's baseline for ADL function and identify physical deficits which impact ability to perform ADLs (bathing, care of mouth/teeth, toileting, grooming, dressing, etc )  - Assess/evaluate cause of self-care deficits   - Assess range of motion  - Assess patient's mobility; develop plan if impaired  - Assess patient's need for assistive devices and provide as appropriate  - Encourage maximum independence but intervene and supervise when necessary  - Involve family in performance of ADLs  - Assess for home care needs following discharge   - Consider OT consult to assist with ADL evaluation and planning for discharge  - Provide patient education as appropriate  Outcome: Progressing  Goal: Maintain or return mobility status to optimal level  Description  INTERVENTIONS:  - Assess patient's baseline mobility status (ambulation, transfers, stairs, etc )    - Identify cognitive and physical deficits and behaviors that affect mobility  - Identify mobility aids required to assist with transfers and/or ambulation (gait belt, sit-to-stand, lift, walker, cane, etc )  - [place] fall precautions as indicated by assessment  - Record patient progress and toleration of activity level on Mobility SBAR; progress patient to next Phase/Stage  - Instruct patient to call for assistance with activity based on assessment  - Consider rehabilitation consult to assist with strengthening/weightbearing, etc   Outcome: Progressing     Problem: DISCHARGE PLANNING  Goal: Discharge to home or other facility with appropriate resources  Description  INTERVENTIONS:  - Identify barriers to discharge w/patient and caregiver  - Arrange for needed discharge resources and transportation as appropriate  - Identify discharge learning needs (meds, wound care, etc )  - Arrange for interpretive services to assist at discharge as needed  - Refer to Case Management Department for coordinating discharge planning if the patient needs post-hospital services based on physician/advanced practitioner order or complex needs related to functional status, cognitive ability, or social support system  Outcome: Progressing     Problem: Knowledge Deficit  Goal: Patient/family/caregiver demonstrates understanding of disease process, treatment plan, medications, and discharge instructions  Description  Complete learning assessment and assess knowledge base    Interventions:  - Provide teaching at level of understanding  - Provide teaching via preferred learning methods  Outcome: Progressing     Problem: SKIN/TISSUE INTEGRITY - ADULT  Goal: Skin integrity remains intact  Description  INTERVENTIONS  - Identify patients at risk for skin breakdown  - Assess and monitor skin integrity  - Assess and monitor nutrition and hydration status  - Monitor labs (i e  albumin)  - Assess for incontinence   - Turn and reposition patient  - Assist with mobility/ambulation  - Relieve pressure over bony prominences  - Avoid friction and shearing  - Provide appropriate hygiene as needed including keeping skin clean and dry  - Evaluate need for skin moisturizer/barrier cream  - Collaborate with interdisciplinary team (i e  Nutrition, Rehabilitation, etc )   - Patient/family teaching  Outcome: Progressing     Problem: MUSCULOSKELETAL - ADULT  Goal: Maintain or return mobility to safest level of function  Description  INTERVENTIONS:  - Assess patient's ability to carry out ADLs; assess patient's baseline for ADL function and identify physical deficits which impact ability to perform ADLs (bathing, care of mouth/teeth, toileting, grooming, dressing, etc )  - Assess/evaluate cause of self-care deficits   - Assess range of motion  - Assess patient's mobility  - Assess patient's need for assistive devices and provide as appropriate  - Encourage maximum independence but intervene and supervise when necessary  - Involve family in performance of ADLs  - Assess for home care needs following discharge   - Consider OT consult to assist with ADL evaluation and planning for discharge  - Provide patient education as appropriate  Outcome: Progressing  Goal: Maintain proper alignment of affected body part  Description  INTERVENTIONS:  - Support, maintain and protect limb and body alignment  - Provide patient/ family with appropriate education  Outcome: Progressing

## 2020-03-16 PROCEDURE — 97530 THERAPEUTIC ACTIVITIES: CPT

## 2020-03-16 PROCEDURE — 97116 GAIT TRAINING THERAPY: CPT

## 2020-03-16 PROCEDURE — 97535 SELF CARE MNGMENT TRAINING: CPT

## 2020-03-16 PROCEDURE — 99233 SBSQ HOSP IP/OBS HIGH 50: CPT | Performed by: HOSPITALIST

## 2020-03-16 RX ORDER — MORPHINE SULFATE 15 MG/1
15 TABLET, FILM COATED, EXTENDED RELEASE ORAL EVERY 12 HOURS SCHEDULED
Status: DISCONTINUED | OUTPATIENT
Start: 2020-03-16 | End: 2020-03-19 | Stop reason: HOSPADM

## 2020-03-16 RX ORDER — OXYCODONE HYDROCHLORIDE 5 MG/1
7.5 TABLET ORAL EVERY 6 HOURS PRN
Status: DISCONTINUED | OUTPATIENT
Start: 2020-03-16 | End: 2020-03-19 | Stop reason: HOSPADM

## 2020-03-16 RX ORDER — METHYLPREDNISOLONE 16 MG/1
16 TABLET ORAL DAILY
Status: COMPLETED | OUTPATIENT
Start: 2020-03-18 | End: 2020-03-18

## 2020-03-16 RX ORDER — METHYLPREDNISOLONE 4 MG/1
12 TABLET ORAL DAILY
Status: COMPLETED | OUTPATIENT
Start: 2020-03-19 | End: 2020-03-19

## 2020-03-16 RX ORDER — METHYLPREDNISOLONE 4 MG/1
8 TABLET ORAL DAILY
Status: DISCONTINUED | OUTPATIENT
Start: 2020-03-20 | End: 2020-03-19 | Stop reason: HOSPADM

## 2020-03-16 RX ORDER — METHYLPREDNISOLONE 4 MG/1
4 TABLET ORAL DAILY
Status: DISCONTINUED | OUTPATIENT
Start: 2020-03-21 | End: 2020-03-19 | Stop reason: HOSPADM

## 2020-03-16 RX ADMIN — OXYCODONE HYDROCHLORIDE 7.5 MG: 5 TABLET ORAL at 19:16

## 2020-03-16 RX ADMIN — BISACODYL 10 MG: 5 TABLET, COATED ORAL at 10:04

## 2020-03-16 RX ADMIN — DICLOFENAC 4 G: 10 GEL TOPICAL at 13:46

## 2020-03-16 RX ADMIN — MELATONIN 6 MG: at 21:33

## 2020-03-16 RX ADMIN — BUSPIRONE HYDROCHLORIDE 5 MG: 5 TABLET ORAL at 10:04

## 2020-03-16 RX ADMIN — MELOXICAM 15 MG: 7.5 TABLET ORAL at 10:04

## 2020-03-16 RX ADMIN — LIDOCAINE 1 PATCH: 50 PATCH TOPICAL at 21:28

## 2020-03-16 RX ADMIN — BUSPIRONE HYDROCHLORIDE 5 MG: 5 TABLET ORAL at 16:47

## 2020-03-16 RX ADMIN — HYDROMORPHONE HYDROCHLORIDE 1 MG: 2 TABLET ORAL at 10:13

## 2020-03-16 RX ADMIN — PRAZOSIN HYDROCHLORIDE 2 MG: 2 CAPSULE ORAL at 21:35

## 2020-03-16 RX ADMIN — PREGABALIN 100 MG: 100 CAPSULE ORAL at 21:33

## 2020-03-16 RX ADMIN — DULOXETINE HYDROCHLORIDE 30 MG: 30 CAPSULE, DELAYED RELEASE ORAL at 10:03

## 2020-03-16 RX ADMIN — PREGABALIN 100 MG: 100 CAPSULE ORAL at 10:04

## 2020-03-16 RX ADMIN — OXYBUTYNIN CHLORIDE 5 MG: 5 TABLET ORAL at 19:17

## 2020-03-16 RX ADMIN — PANTOPRAZOLE SODIUM 20 MG: 20 TABLET, DELAYED RELEASE ORAL at 06:39

## 2020-03-16 RX ADMIN — METHYLPREDNISOLONE 24 MG: 16 TABLET ORAL at 13:45

## 2020-03-16 RX ADMIN — HYDROMORPHONE HYDROCHLORIDE 1 MG: 2 TABLET ORAL at 02:59

## 2020-03-16 RX ADMIN — MORPHINE SULFATE 15 MG: 15 TABLET, FILM COATED, EXTENDED RELEASE ORAL at 12:27

## 2020-03-16 RX ADMIN — LORAZEPAM 0.5 MG: 0.5 TABLET ORAL at 16:47

## 2020-03-16 RX ADMIN — BUSPIRONE HYDROCHLORIDE 5 MG: 5 TABLET ORAL at 21:33

## 2020-03-16 RX ADMIN — PROPRANOLOL HYDROCHLORIDE 20 MG: 20 TABLET ORAL at 19:18

## 2020-03-16 RX ADMIN — HYDROXYZINE HYDROCHLORIDE 50 MG: 50 TABLET, FILM COATED ORAL at 21:33

## 2020-03-16 RX ADMIN — OXYCODONE HYDROCHLORIDE 10 MG: 10 TABLET ORAL at 06:39

## 2020-03-16 RX ADMIN — MORPHINE SULFATE 15 MG: 15 TABLET, FILM COATED, EXTENDED RELEASE ORAL at 21:33

## 2020-03-16 RX ADMIN — DICLOFENAC 4 G: 10 GEL TOPICAL at 09:30

## 2020-03-16 RX ADMIN — DULOXETINE HYDROCHLORIDE 60 MG: 60 CAPSULE, DELAYED RELEASE ORAL at 21:33

## 2020-03-16 RX ADMIN — QUETIAPINE FUMARATE 300 MG: 300 TABLET, EXTENDED RELEASE ORAL at 21:35

## 2020-03-16 RX ADMIN — MELATONIN 1000 UNITS: at 10:04

## 2020-03-16 RX ADMIN — LORATADINE 10 MG: 10 TABLET ORAL at 10:04

## 2020-03-16 RX ADMIN — SENNOSIDES AND DOCUSATE SODIUM 1 TABLET: 8.6; 5 TABLET ORAL at 10:04

## 2020-03-16 RX ADMIN — HEPARIN SODIUM 7500 UNITS: 5000 INJECTION INTRAVENOUS; SUBCUTANEOUS at 13:41

## 2020-03-16 RX ADMIN — HEPARIN SODIUM 7500 UNITS: 5000 INJECTION INTRAVENOUS; SUBCUTANEOUS at 21:38

## 2020-03-16 RX ADMIN — PREGABALIN 100 MG: 100 CAPSULE ORAL at 16:47

## 2020-03-16 RX ADMIN — LACTULOSE 20 G: 10 SOLUTION ORAL at 10:05

## 2020-03-16 RX ADMIN — DICLOFENAC 4 G: 10 GEL TOPICAL at 21:27

## 2020-03-16 RX ADMIN — TRAZODONE HYDROCHLORIDE 150 MG: 100 TABLET ORAL at 21:33

## 2020-03-16 RX ADMIN — OXYBUTYNIN CHLORIDE 5 MG: 5 TABLET ORAL at 10:05

## 2020-03-16 NOTE — PROGRESS NOTES
INTERNAL MEDICINE RESIDENCY PROGRESS NOTE     Name: Sheng Gonzalez   Age & Sex: 62 y o  female   MRN: [id number]  Unit/Bed#: [address] [facility] [address] 822-01   Encounter: [id number]  Team: SOD Team A    PATIENT INFORMATION     Name: Sheng Gonzalez   Age & Sex: 62 y o  female   MRN: [id number]  Hospital Stay Days: 6    ASSESSMENT/PLAN     Principal Problem:    Intractable low back pain  Active Problems:    Chronic pain disorder    Major depressive disorder, recurrent episode, moderate degree ([facility])    Bipolar II disorder ([facility])    Hypertension    Migraine    Tardive dyskinesia      * Intractable low back pain  Assessment & Plan  History of chronic low back pain and bilateral lower extremity pain  Patient came in today for worsening of her pain  Labs significant for CRP for 48 2, sed rate 16, alk phos 131  CT scan Lucency surrounding the superior pedicle screw on the right at T7 with paraspinal soft tissue thickening and endplate erosion   Findings suggest loosening and possible infection   Mild irregularity of endplates is seen at I3-2 and T7-8   The spinal canal is not well evaluated this level due to streak artifact versus any canal narrowing is not excluded  Lucency surrounds the left S1 pedicle screw which also suggests possible loosening and degenerative changes at L5-S1  Thoracic MRI limited, unable to rule out infectious etiology due to poor study    Lumbar MRI, limited however no gross pathologic processes  Plan:  · Pain control:  Oxycodone 7 5 mg Q 6 p r n  for moderate pain and severe pain, MS Contin 15 mg bid   · Neurosurgical outpatient follow-up for possible spinal stimulator   · Heating pad prn   · Lidocain patch at T12 paraspinal   · Medrol dose pack    Major depressive disorder, recurrent episode, moderate degree ([facility])  Assessment & Plan  Will continue Cymbalta 90 mg total for history of depression    Will hold on to risperidone and quetiapine since patient has severe tardive dyskinesia on physical exam     Chronic pain disorder  Assessment & Plan  Patient with long history of chronic back pain  Follows up with Neurosurgery outpatient  Patient recently evaluated by neurosurgery for possible spinal stimulator  Admits to trying Lyrica, ibuprofen, Tylenol, Cymbalta and was recently prescribed morphine 15 mg b i d  (60 tablets total) by her PCP  Patient admits that none of the pain medications have helped her with her pain so far  Mild feverresolved as of 3/10/2020  Assessment & Plan  With fever of 102 8  Repeat temperature 100 6°  Down trending blood pressure but no episodes of hypotension  Patient denied any URI symptoms or urinary symptoms  Unclear source at this time  Blood  Cultures negative at 24 hours  Plan:  · Continue to monitor fever and WBC daily    Tardive dyskinesia  Assessment & Plan  Patient currently on multiple psychiatric medications  Medication reconciliation is limited because patient does not know which medications she is taking  Patient was found to have tardive dyskinesia on physical exam   Will hold Seroquel, Klonopin and Lamictal   Call her pharmacy before initiating any medications  Migraine  Assessment & Plan  Admits to having her typical migraine headaches on presentation  Will continue home dose of Propanolol 20 mg b i d  Will give IV magnesium and Toradol 15 mg b i d  Hypertension  Assessment & Plan  Will continue amlodipine 10 mg daily  Bipolar II disorder [facility])  Assessment & Plan  Call pharmacy before initiating any psychiatric medication  Patient needs close outpatient psychiatric follow-up  Will hold on to Klonopin, Lamictal, Seroquel, risperidone  Disposition: Medically stable for discharge pending placement      SUBJECTIVE     Patient seen and examined  No acute events overnight  Patient concerned about change in pain medications  She does not want to stop taking the oxycodone or dilaudid IV   She describes radicular pain this morning and diffuse back pain  Patient claims that she has been working with PT to get into chair and out of bed  She has not walked the halls very much however  No complaints of chest pain, shortness of breath, abdominal pain, constipation  OBJECTIVE     Vitals:    03/15/20 2238 20 0741 20 1003 20 1008   BP: 132/87 116/76 97/51 105/60   BP Location:  Right arm     Pulse:  63     Resp:  18     Temp:  98 3 °F (36 8 °C)     TempSrc:  Oral     SpO2:  98%     Weight:       Height:          Temperature:   Temp (24hrs), Av 4 °F (36 9 °C), Min:98 3 °F (36 8 °C), Max:98 5 °F (36 9 °C)    Temperature: 98 3 °F (36 8 °C)  Intake & Output:  I/O        07 - 03/15 0700 03/15 07 -  0700  07 -  0700    P  O  660 580 180    I V  (mL/kg) 3120 (33 3)      Total Intake(mL/kg) 3780 (40 3) 580 (6 2) 180 (1 9)    Urine (mL/kg/hr) 950 (0 4) 2200 (1)     Stool 0      Total Output 950 2200     Net +2830 -1620 +180           Unmeasured Urine Occurrence 2 x 4 x     Unmeasured Stool Occurrence 1 x          Weights:   IBW: 45 5 kg    Body mass index is 40 39 kg/m²  Weight (last 2 days)     None        Physical Exam   Constitutional: She is oriented to person, place, and time  She appears well-developed and well-nourished  HENT:   Mouth/Throat: Oropharynx is clear and moist    Eyes: Conjunctivae are normal  No scleral icterus  Neck: No JVD present  Cardiovascular: Normal rate, regular rhythm and normal heart sounds  Pulmonary/Chest: Effort normal and breath sounds normal    Abdominal: Soft  Bowel sounds are normal  She exhibits no distension  There is no tenderness  Musculoskeletal: She exhibits no edema, tenderness or deformity  Neurological: She is alert and oriented to person, place, and time  No cranial nerve deficit or sensory deficit  She exhibits normal muscle tone  Skin: Skin is warm and dry  Nursing note and vitals reviewed  LABORATORY DATA     Labs:  I have personally reviewed pertinent reports  Results from last 7 days   Lab Units 03/10/20  0606   WBC Thousand/uL 2 79*   HEMOGLOBIN g/dL 12 2   HEMATOCRIT % 37 3   PLATELETS Thousands/uL 172   NEUTROS PCT % 43   MONOS PCT % 16*      Results from last 7 days   Lab Units 03/10/20  0606 03/09/20  1551   POTASSIUM mmol/L 3 5 4 1   CHLORIDE mmol/L 110* 109*   CO2 mmol/L 24 27   BUN mg/dL 6 7   CREATININE mg/dL 0 56* 0 72   CALCIUM mg/dL 8 4 8 4                            IMAGING & DIAGNOSTIC TESTING     Radiology Results: I have personally reviewed pertinent reports  Ct Spine Thoracic Wo Contrast    Addendum Date: 3/8/2020    ADDENDUM: There is a typo in the conclusion  The 4th sentence should read "the spinal canal is not well evaluated at this level (T7) due to streak artifact; consequently, soft tissue canal narrowing is not excluded "  Clinical correlation with any neurologic signs including lower extremity weakness may be useful  Result Date: 3/8/2020  Impression: Lucency surrounding the superior pedicle screw on the right at T7 with paraspinal soft tissue thickening and endplate erosion  Findings suggest loosening and possible infection  Mild irregularity of endplates is seen at M9-3 and T7-8  The spinal canal  is not well evaluated this level due to streak artifact versus any canal narrowing is not excluded  Some endplate irregularity is seen at L5-S1 with also sclerosis which is more likely degenerative although some inflammatory component is not excluded  Lucency surrounds the left S1 pedicle screw which also suggests possible loosening  The right screw appears to pass into the right S1 foramen  Streak artifact does limit this study making assessment of canal narrowing difficult elsewhere  Possible erosion involving the right facet of L3-L4   Targeted MRI of the thoracic spine still may be useful with contrast to assess for any new marrow edema as compared to January as well as as well as any new canal involvement but may be limited due to streak artifact  Nuclear medicine study may also be useful  If prior CTs of the spine postoperatively are available that would be very helpful and an addendum could be rendered  Results were discussed with Dr Manav Benton in the emergency room Workstation performed: [id number]     Ct Spine Lumbar Wo Contrast    Addendum Date: 3/8/2020    ADDENDUM: There is a typo in the conclusion  The 4th sentence should read "the spinal canal is not well evaluated at this level (T7) due to streak artifact; consequently, soft tissue canal narrowing is not excluded "  Clinical correlation with any neurologic signs including lower extremity weakness may be useful  Result Date: 3/8/2020  Impression: Lucency surrounding the superior pedicle screw on the right at T7 with paraspinal soft tissue thickening and endplate erosion  Findings suggest loosening and possible infection  Mild irregularity of endplates is seen at U9-5 and T7-8  The spinal canal  is not well evaluated this level due to streak artifact versus any canal narrowing is not excluded  Some endplate irregularity is seen at L5-S1 with also sclerosis which is more likely degenerative although some inflammatory component is not excluded  Lucency surrounds the left S1 pedicle screw which also suggests possible loosening  The right screw appears to pass into the right S1 foramen  Streak artifact does limit this study making assessment of canal narrowing difficult elsewhere  Possible erosion involving the right facet of L3-L4  Targeted MRI of the thoracic spine still may be useful with contrast to assess for any new marrow edema as compared to January as well as as well as any new canal involvement but may be limited due to streak artifact  Nuclear medicine study may also be useful  If prior CTs of the spine postoperatively are available that would be very helpful and an addendum could be rendered   Results were discussed with Dr Manav Benton in the emergency room Workstation performed: [id number]     Mri Thoracic Spine Wo Contrast    Result Date: 3/10/2020  Impression: Markedly limited exam of the thoracic spine owing to motion and ferromagnetic posterior fixation hardware  Patient is fused from  T7 to the  Solitary, right T7 screw is loose based on CT  Although discitis osteomyelitis at T6-T7 cannot be excluded with certainty, this is the transition point where one would expect significant degenerative changes at the margin between the relatively mobile spine and  the long fixed segment extending to the sacrum  Workstation performed: [id number]     Mri Lumbar Spine W Wo Contrast    Result Date: 3/12/2020  Impression: Markedly limited exam owing to extensive surgical hardware  Enhancement and edema which occur within the vertebral bodies and disc at the T6-T7 level are nonspecific  There is no paraspinal phlegmon  This is favored to be degenerative in nature  However, serologic exclusion suggested with ESR, CRP and WBC  There does not appear to be significant lumbar spine canal stenosis  Workstation performed: [id number]     Other Diagnostic Testing: I have personally reviewed pertinent reports      ACTIVE MEDICATIONS     Current Facility-Administered Medications   Medication Dose Route Frequency    amLODIPine (NORVASC) tablet 5 mg  5 mg Oral Daily    bisacodyl (DULCOLAX) EC tablet 10 mg  10 mg Oral Daily    busPIRone (BUSPAR) tablet 5 mg  5 mg Oral TID    cholecalciferol (VITAMIN D3) tablet 1,000 Units  1,000 Units Oral Daily    cyclobenzaprine (FLEXERIL) tablet 5 mg  5 mg Oral TID PRN    diclofenac sodium (VOLTAREN) 1 % topical gel 4 g  4 g Topical 4x Daily    DULoxetine (CYMBALTA) delayed release capsule 30 mg  30 mg Oral Daily    DULoxetine (CYMBALTA) delayed release capsule 60 mg  60 mg Oral HS    heparin (porcine) subcutaneous injection 7,500 Units  7,500 Units Subcutaneous Q8H [address]    hydrOXYzine HCL (ATARAX) tablet 50 mg  50 mg Oral HS    lactulose 20 g/30 mL oral solution 20 g  20 g Oral Daily    lidocaine (LIDODERM) 5 % patch 1 patch  1 patch Topical HS    loratadine (CLARITIN) tablet 10 mg  10 mg Oral Daily    LORazepam (ATIVAN) tablet 0 5 mg  0 5 mg Oral Q8H PRN    melatonin tablet 6 mg  6 mg Oral HS    meloxicam (MOBIC) tablet 15 mg  15 mg Oral Daily    methylprednisolone (MEDROL) tablet 24 mg  24 mg Oral Daily    Followed by   Wicho Buys ON 3/17/2020] methylprednisolone (MEDROL) tablet 20 mg  20 mg Oral Daily    Followed by   Wicho Buys ON 3/18/2020] methylPREDNISolone (MEDROL) tablet 16 mg  16 mg Oral Daily    Followed by   Wicho Buys ON 3/19/2020] methylprednisolone (MEDROL) tablet 12 mg  12 mg Oral Daily    Followed by   Wicho Buys ON 3/20/2020] methylprednisolone (MEDROL) tablet 8 mg  8 mg Oral Daily    Followed by   Wicho Buys ON 3/21/2020] methylprednisolone (MEDROL) tablet 4 mg  4 mg Oral Daily    morphine (MS CONTIN) ER tablet 15 mg  15 mg Oral Q12H [address]    ondansetron (ZOFRAN) injection 4 mg  4 mg Intravenous Q6H PRN    oxybutynin (DITROPAN) tablet 5 mg  5 mg Oral BID    oxyCODONE (ROXICODONE) IR tablet 7 5 mg  7 5 mg Oral Q6H PRN    pantoprazole (PROTONIX) EC tablet 20 mg  20 mg Oral Early Morning    prazosin (MINIPRESS) capsule 2 mg  2 mg Oral Daily    pregabalin (LYRICA) capsule 100 mg  100 mg Oral TID    propranolol (INDERAL) tablet 20 mg  20 mg Oral BID    QUEtiapine (SEROquel XR) 24 hr tablet 300 mg  300 mg Oral HS    senna-docusate sodium (SENOKOT S) 8 6-50 mg per tablet 1 tablet  1 tablet Oral Daily    traZODone (DESYREL) tablet 150 mg  150 mg Oral HS       VTE Pharmacologic Prophylaxis: Heparin  VTE Mechanical Prophylaxis: sequential compression device    Portions of the record may have been created with voice recognition software  Occasional wrong word or "sound a like" substitutions may have occurred due to the inherent limitations of voice recognition software    Read the chart carefully and recognize, using context, where substitutions have occurred   ==  Pat Castellon MD  [facility]  Internal Medicine Residency PGY-1

## 2020-03-16 NOTE — PLAN OF CARE
Problem: Potential for Falls  Goal: Patient will remain free of falls  Description  INTERVENTIONS:  - Assess patient frequently for physical needs  -  Identify cognitive and physical deficits and behaviors that affect risk of falls    -  [place] fall precautions as indicated by assessment   - Educate patient/family on patient safety including physical limitations  - Instruct patient to call for assistance with activity based on assessment  - Modify environment to reduce risk of injury  - Consider OT/PT consult to assist with strengthening/mobility  Outcome: Progressing     Problem: Prexisting or High Potential for Compromised Skin Integrity  Goal: Skin integrity is maintained or improved  Description  INTERVENTIONS:  - Identify patients at risk for skin breakdown  - Assess and monitor skin integrity  - Assess and monitor nutrition and hydration status  - Monitor labs   - Assess for incontinence   - Turn and reposition patient  - Assist with mobility/ambulation  - Relieve pressure over bony prominences  - Avoid friction and shearing  - Provide appropriate hygiene as needed including keeping skin clean and dry  - Evaluate need for skin moisturizer/barrier cream  - Collaborate with interdisciplinary team   - Patient/family teaching  - Consider wound care consult   Outcome: Progressing     Problem: PAIN - ADULT  Goal: Verbalizes/displays adequate comfort level or baseline comfort level  Description  Interventions:  - Encourage patient to monitor pain and request assistance  - Assess pain using appropriate pain scale  - Administer analgesics based on type and severity of pain and evaluate response  - Implement non-pharmacological measures as appropriate and evaluate response  - Consider cultural and social influences on pain and pain management  - Notify physician/advanced practitioner if interventions unsuccessful or patient reports new pain  Outcome: Progressing     Problem: INFECTION - ADULT  Goal: Absence or prevention of progression during hospitalization  Description  INTERVENTIONS:  - Assess and monitor for signs and symptoms of infection  - Monitor lab/diagnostic results  - Monitor all insertion sites, i e  indwelling lines, tubes, and drains  - Monitor endotracheal if appropriate and nasal secretions for changes in amount and color  - [place] appropriate cooling/warming therapies per order  - Administer medications as ordered  - Instruct and encourage patient and family to use good hand hygiene technique  - Identify and instruct in appropriate isolation precautions for identified infection/condition  Outcome: Progressing  Goal: Absence of fever/infection during neutropenic period  Description  INTERVENTIONS:  - Monitor WBC    Outcome: Progressing     Problem: SAFETY ADULT  Goal: Patient will remain free of falls  Description  INTERVENTIONS:  - Assess patient frequently for physical needs  -  Identify cognitive and physical deficits and behaviors that affect risk of falls    -  [place] fall precautions as indicated by assessment   - Educate patient/family on patient safety including physical limitations  - Instruct patient to call for assistance with activity based on assessment  - Modify environment to reduce risk of injury  - Consider OT/PT consult to assist with strengthening/mobility  Outcome: Progressing  Goal: Maintain or return to baseline ADL function  Description  INTERVENTIONS:  -  Assess patient's ability to carry out ADLs; assess patient's baseline for ADL function and identify physical deficits which impact ability to perform ADLs (bathing, care of mouth/teeth, toileting, grooming, dressing, etc )  - Assess/evaluate cause of self-care deficits   - Assess range of motion  - Assess patient's mobility; develop plan if impaired  - Assess patient's need for assistive devices and provide as appropriate  - Encourage maximum independence but intervene and supervise when necessary  - Involve family in performance of ADLs  - Assess for home care needs following discharge   - Consider OT consult to assist with ADL evaluation and planning for discharge  - Provide patient education as appropriate  Outcome: Progressing  Goal: Maintain or return mobility status to optimal level  Description  INTERVENTIONS:  - Assess patient's baseline mobility status (ambulation, transfers, stairs, etc )    - Identify cognitive and physical deficits and behaviors that affect mobility  - Identify mobility aids required to assist with transfers and/or ambulation (gait belt, sit-to-stand, lift, walker, cane, etc )  - [place] fall precautions as indicated by assessment  - Record patient progress and toleration of activity level on Mobility SBAR; progress patient to next Phase/Stage  - Instruct patient to call for assistance with activity based on assessment  - Consider rehabilitation consult to assist with strengthening/weightbearing, etc   Outcome: Progressing     Problem: DISCHARGE PLANNING  Goal: Discharge to home or other facility with appropriate resources  Description  INTERVENTIONS:  - Identify barriers to discharge w/patient and caregiver  - Arrange for needed discharge resources and transportation as appropriate  - Identify discharge learning needs (meds, wound care, etc )  - Arrange for interpretive services to assist at discharge as needed  - Refer to Case Management Department for coordinating discharge planning if the patient needs post-hospital services based on physician/advanced practitioner order or complex needs related to functional status, cognitive ability, or social support system  Outcome: Progressing     Problem: Knowledge Deficit  Goal: Patient/family/caregiver demonstrates understanding of disease process, treatment plan, medications, and discharge instructions  Description  Complete learning assessment and assess knowledge base    Interventions:  - Provide teaching at level of understanding  - Provide teaching via preferred learning methods  Outcome: Progressing     Problem: SKIN/TISSUE INTEGRITY - ADULT  Goal: Skin integrity remains intact  Description  INTERVENTIONS  - Identify patients at risk for skin breakdown  - Assess and monitor skin integrity  - Assess and monitor nutrition and hydration status  - Monitor labs (i e  albumin)  - Assess for incontinence   - Turn and reposition patient  - Assist with mobility/ambulation  - Relieve pressure over bony prominences  - Avoid friction and shearing  - Provide appropriate hygiene as needed including keeping skin clean and dry  - Evaluate need for skin moisturizer/barrier cream  - Collaborate with interdisciplinary team (i e  Nutrition, Rehabilitation, etc )   - Patient/family teaching  Outcome: Progressing     Problem: MUSCULOSKELETAL - ADULT  Goal: Maintain or return mobility to safest level of function  Description  INTERVENTIONS:  - Assess patient's ability to carry out ADLs; assess patient's baseline for ADL function and identify physical deficits which impact ability to perform ADLs (bathing, care of mouth/teeth, toileting, grooming, dressing, etc )  - Assess/evaluate cause of self-care deficits   - Assess range of motion  - Assess patient's mobility  - Assess patient's need for assistive devices and provide as appropriate  - Encourage maximum independence but intervene and supervise when necessary  - Involve family in performance of ADLs  - Assess for home care needs following discharge   - Consider OT consult to assist with ADL evaluation and planning for discharge  - Provide patient education as appropriate  Outcome: Progressing  Goal: Maintain proper alignment of affected body part  Description  INTERVENTIONS:  - Support, maintain and protect limb and body alignment  - Provide patient/ family with appropriate education  Outcome: Progressing

## 2020-03-16 NOTE — OCCUPATIONAL THERAPY NOTE
OccupationalTherapy Progress Note     Patient Name: Suleiman Alonso  NFOOG'Y Date: 3/16/2020  Problem List  Principal Problem:    Intractable low back pain  Active Problems:    Chronic pain disorder    Bipolar II disorder ([address] )    Hypertension    Major depressive disorder, recurrent episode, moderate degree (HCC)    Migraine    Tardive dyskinesia           Subjective:       03/16/20 4602   Restrictions/Precautions   Weight Bearing Precautions Per Order No   Other Precautions Cognitive;Telemetry; Fall Risk;Pain   Lifestyle   Autonomy I ADL's, assistance from family with IADL's, +lanta van for transportation   Reciprocal Relationships son, aunt, family   Service to Others on disability   Intrinsic Gratification acqua therapy   ADL   Where Assessed Edge of bed   UB Dressing Assistance 5  Supervision/Setup   UB Dressing Deficit Setup  (to don over head sweatshirt)   LB Dressing Assistance 2  Maximal Assistance   LB Dressing Comments total assist to don socks, mod a to thread pants over feet, othewise pt able to pull up over knees and hike over hips with CS for dynamic standing balance  Bed Mobility   Additional Comments pt received seated at edge of bed, left in chair with RN present   Transfers   Sit to Stand 4  Minimal assistance   Additional items Assist x 1  (Supervision 2nd trial with sit to stand transfers)   Stand to Sit 5  Supervision   Additional items Assist x 1;Verbal cues   Functional Mobility   Functional Mobility 5  Supervision   Additional Comments min a for CG/CS with RW household distance functional ambulation with 2 rest breaks, SBA of another for chair follow  (vitals remained WFL/asymptomtic throughout therapy session )   Additional items Rolling walker   Cognition   Overall Cognitive Status Impaired   Arousal/Participation Alert; Cooperative   Attention Attends with cues to redirect   Orientation Level Oriented X4   Memory Decreased recall of precautions;Decreased recall of recent events Following Commands Follows multistep commands with increased time or repetition   Comments pt conversed with mumbled speech, difficult to understand at times, occassional disorganized thought process/speech, verbal cues for safety  Activity Tolerance   Activity Tolerance Patient limited by fatigue;Patient limited by pain   Medical Staff Made Aware RN cleared pt for therapy   Assessment   Assessment Patient participated in Skilled OT session this date with interventions consisting of functional transfers, functional mobility, self care tasks EOB, activity tolerance -see assistance levels above   Patient agreeable to OT treatment session, upon arrival patient was found seated at edge of bed  In comparison to previous session, patient with improvements in functional transfers/mobility, pt progressing toward S levels with ADL's continue to recommend STR 2* to fluctuating assistance levels with mobility  Patient requiring frequent rest periods and ocassional safety reminders  Patient continues to be functioning below baseline level, occupational performance remains limited secondary to factors listed above and increased risk for falls and injury  From OT standpoint, recommendation at time of d/c would be Short Term Rehab,    Patient to benefit from continued Occupational Therapy treatment while in the hospital to address deficits as defined above and maximize level of functional independence with ADLs and functional mobility  Plan   Treatment Interventions ADL retraining;Functional transfer training; Endurance training;Patient/family training;Equipment evaluation/education; Compensatory technique education; Activityengagement   Goal Expiration Date 03/24/20   OT Treatment Day 2   OT Frequency 3-5x/wk   Recommendation   OT Discharge Recommendation Short Term Rehab     OT - OK to Discharge Yes   Rudolph Quezada MOT, OTR/L

## 2020-03-16 NOTE — UTILIZATION REVIEW
Continued Stay Review    Date: 3/15/20                        Current Patient Class: IP    Current Level of Care: MS    HPI:54 y o  female initially admitted on 3/10 with c/o worsening chronic back pain over several days  CT spine showed loosening possible infection of superior pedicle screw on R T7  Pt has been seen by neurosurgery and is being evaluated for spinal cord stimulator implantation  Has intractable LBP, chronic pain disorder, Tardive dyskinesia,  Mild fever, h/o migraines, major depressive disorder, bipolar disorder  Assessment/Plan:   Pt was seen by neurosurgery and they will f/u with her on an OP basis for spineal stimulator  She is wearing a Lidocaine patch at T12  She has PRN analgesia and heating pad PRN  Recommendation for  rehab - pt agrees to go  She [facility] and the process is ongoing        Pertinent Labs/Diagnostic Results:   Results from last 7 days   Lab Units 03/10/20  0606   WBC Thousand/uL 2 79*   HEMOGLOBIN g/dL 12 2   HEMATOCRIT % 37 3   PLATELETS Thousands/uL 172   NEUTROS ABS Thousands/µL 1 20*     Results from last 7 days   Lab Units 03/10/20  0606   SODIUM mmol/L 139   POTASSIUM mmol/L 3 5   CHLORIDE mmol/L 110*   CO2 mmol/L 24   ANION GAP mmol/L 5   BUN mg/dL 6   CREATININE mg/dL 0 56*   EGFR ml/min/1 73sq m 121   CALCIUM mg/dL 8 4     Results from last 7 days   Lab Units 03/10/20  0606   GLUCOSE RANDOM mg/dL 97     Vital Signs:   03/16/20 15:36:18  99 1 °F (37 3 °C)  65  18  135/85  102  97 %  None (Room air)   03/16/20 10:08:04        105/60  75       03/16/20 10:03:01        97/51  66       03/16/20 07:41:35  98 3 °F (36 8 °C)  63  18  116/76  89  98 %  None (Room air)   03/15/20 22:38:39        132/87  102       03/15/20 1845    65    116/68         03/15/20 1645              None (Room air)   03/15/20 16:05:38  98 3 °F (36 8 °C)  62  20  142/90  107  99 %     03/15/20 12:39:52  98 5 °F (36 9 °C)              03/15/20 0900    74    122/86         03/15/20 07:22:59  97 6 °F (36 4 °C)  66  16  124/77  93  97 %     03/14/20 23:25:29  98 2 °F (36 8 °C)  62  16  129/79  96  95 %     03/14/20 15:03:24  98 5 °F (36 9 °C)  71  20  140/88  105  98 %     03/14/20 07:06:30  98 2 °F (36 8 °C)  62  18  137/90  106  96 %       Medications:   Scheduled Medications:    Medications:  amLODIPine 5 mg Oral Daily   bisacodyl 10 mg Oral Daily   busPIRone 5 mg Oral TID   cholecalciferol 1,000 Units Oral Daily   diclofenac sodium 4 g Topical 4x Daily   DULoxetine 30 mg Oral Daily   DULoxetine 60 mg Oral HS   heparin (porcine) 7,500 Units Subcutaneous Q8H [facility]   hydrOXYzine HCL 50 mg Oral HS   lactulose 20 g Oral Daily   lidocaine 1 patch Topical HS   loratadine 10 mg Oral Daily   melatonin 6 mg Oral HS   meloxicam 15 mg Oral Daily   [START ON 3/17/2020] methylPREDNISolone 20 mg Oral Daily   Followed by      Sukhdeep Butterfield ON 3/18/2020] methylPREDNISolone 16 mg Oral Daily   Followed by      Sukhdeep Butterfield ON 3/19/2020] methylPREDNISolone 12 mg Oral Daily   Followed by      Sukhdeep Butterfield ON 3/20/2020] methylPREDNISolone 8 mg Oral Daily   Followed by      Sukhdeep Butterfield ON 3/21/2020] methylPREDNISolone 4 mg Oral Daily   morphine 15 mg Oral Q12H KESHIA   oxybutynin 5 mg Oral BID   pantoprazole 20 mg Oral Early Morning   prazosin 2 mg Oral Daily   pregabalin 100 mg Oral TID   propranolol 20 mg Oral BID   QUEtiapine 300 mg Oral HS   senna-docusate sodium 1 tablet Oral Daily   traZODone 150 mg Oral HS     Continuous IV Infusions:     PRN Meds:    cyclobenzaprine 5 mg Oral TID PRN    Hydromorphone 1 mg  oral PRN X 1 3/15   LORazepam 0 5 mg Oral Q8H PRN    ondansetron 4 mg Intravenous Q6H PRN x1 3/15   oxyCODONE 7 5 mg Oral Q6H PRN x4 3/15     Discharge Plan: TBD    Network Utilization Review Department  [email or web address]  ATTENTION: Please call with any questions or concerns to [phone] and carefully listen to the prompts so that you are directed to the right person  All voicemails are confidential   Mindy Grant all requests for admission clinical reviews, approved or denied determinations and any other requests to dedicated fax number below belonging to the campus where the patient is receiving treatment   List of dedicated fax numbers for the Facilities:  [address] [place] [address] DENIALS (Administrative/Medical Necessity) [phone]   [address] (Maternity/N[facility]/Pediatrics) [phone]   Jose Alfredo Luo [phone]   Sandip Ardon [phone]   Angelo Sierra [phone]   [address] [place] [address]  [phone]   [address] [place] [address] [phone]   [facility]  [phone]   [address] [facility]  [facility] [address] [facility] [phone]

## 2020-03-16 NOTE — PLAN OF CARE
Problem: PHYSICAL THERAPY ADULT  Goal: Performs mobility at highest level of function for planned discharge setting  See evaluation for individualized goals  Description  Treatment/Interventions: Functional transfer training, LE strengthening/ROM, Elevations, Therapeutic exercise, Endurance training, Patient/family training, Equipment eval/education, Bed mobility, Gait training          See flowsheet documentation for full assessment, interventions and recommendations  Outcome: Progressing  Note:   Prognosis: Good  Problem List: Decreased strength, Decreased endurance, Impaired balance, Decreased mobility  Assessment: Pt presents to therapy today with reduced mobility, limited endurance, high risk of falling, pain in back, gait abnormalities  These impairments limit the patient by requiring assistance for mobility and places her at an increased risk of falling  Pt would benefit from continued skilled therapy while in the hospital to improve overall mobility and work towards a safe d/c  Recommend rehab  At end of session patient was left seated with call bell within reach  Barriers to Discharge: Decreased caregiver support     Recommendation: Post acute IP rehab     PT - OK to Discharge: Yes    See flowsheet documentation for full assessment

## 2020-03-16 NOTE — PLAN OF CARE
Problem: OCCUPATIONAL THERAPY ADULT  Goal: Performs self-care activities at highest level of function for planned discharge setting  See evaluation for individualized goals  Description  Treatment Interventions: ADL retraining, Functional transfer training, Endurance training, Cognitive reorientation, Patient/family training, Equipment evaluation/education, Compensatory technique education, Activityengagement          See flowsheet documentation for full assessment, interventions and recommendations  Outcome: Progressing  Note:   Limitation: Decreased ADL status, Decreased Safe judgement during ADL, Decreased cognition, Decreased endurance, Decreased self-care trans, Decreased high-level ADLs  Prognosis: Fair  Assessment: (P) Patient participated in Skilled OT session this date with interventions consisting of functional transfers, functional mobility, self care tasks EOB, activity tolerance -see assistance levels above   Patient agreeable to OT treatment session, upon arrival patient was found seated at edge of bed  In comparison to previous session, patient with improvements in functional transfers/mobility   Patient requiring frequent rest periods and ocassional safety reminders  Patient continues to be functioning below baseline level, occupational performance remains limited secondary to factors listed above and increased risk for falls and injury  From OT standpoint, recommendation at time of d/c would be Short Term Rehab   Patient to benefit from continued Occupational Therapy treatment while in the hospital to address deficits as defined above and maximize level of functional independence with ADLs and functional mobility        OT Discharge Recommendation: (P) Short Term Rehab(pt is progressing toward home with family support, however p)  OT - OK to Discharge: (P) Yes

## 2020-03-16 NOTE — PHYSICAL THERAPY NOTE
Physical Therapy Treatment Note     Patient Name: Leslie Baez    ZIXSA'W Date: 3/16/2020     Problem List  Principal Problem:    Intractable low back pain  Active Problems:    Chronic pain disorder    Bipolar II disorder ([facility])    Hypertension    Major depressive disorder, recurrent episode, moderate degree ([facility])    Migraine    Tardive dyskinesia       Past Medical History  Past Medical History:   Diagnosis Date    Acid reflux     Anxiety     RESOLVED: [id number]    Arthritis     Bipolar 2 disorder ([facility])     FOLLOWS WITH PSYCHIATRIST  CONTINUE LAMOTRIGINE; RESOLVED: [id number]    Depression     Familial tremor     both hands    Fibromyalgia     LAST ASSESSED: [id number]    Hearing aid worn     left ear    [place] (hard of hearing)     left ear    Hypertension     Left-sided weakness     Lower back pain     Memory loss of unknown cause     long and short term    Migraine     Overactive bladder     Panic attack     Post traumatic stress disorder     Seasonal allergies     Stroke [facility])     questionable stroke 2009    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    CONT ASA 81 MG DAILY; RESOLVED: [id number]    Urinary incontinence     Wears dentures     partial lower / full upper    Wears glasses         Past Surgical History  Past Surgical History:   Procedure Laterality Date    BACK SURGERY       SECTION      COLONOSCOPY      RESOLVED: [id number]    EAR SURGERY      HYSTERECTOMY  2004    MYRINGOTOMY W/ TUBES Left     NECK SURGERY  2019    ND CYSTOURETHROSCOPY N/A 2016    Procedure: CYSTOSCOPY, BOTOX INJECTION;  Surgeon: Boy Munguia MD;  [facility];  Service: Gynecology    [address]     [address]           [address]   Pain Assessment   Pain Assessment Tool Pain Assessment not indicated - pt denies pain   Pain Score No Pain   Restrictions/Precautions   Weight Bearing Precautions Per Order No   Other Precautions Cognitive; Chair Alarm; Bed Alarm;Multiple lines; Fall Risk   General   Chart Reviewed Yes   Family/Caregiver Present No   Cognition   Overall Cognitive Status Impaired   Arousal/Participation Alert; Cooperative   Attention Within functional limits   Orientation Level Oriented X4   Memory Within functional limits   Following Commands Follows all commands and directions without difficulty   Transfers   Sit to Stand 4  Minimal assistance   Additional items Assist x 1   Stand to Sit 4  Minimal assistance   Additional items Assist x 1   Ambulation/Elevation   Gait pattern Shuffling; Forward Flexion   Gait Assistance 4  Minimal assist   Additional items Assist x 1;Assist x 2  (chair follow)   Assistive Device Rolling walker   Distance 50ftx1, 60ftx2   Balance   Static Sitting Good   Dynamic Sitting Fair +   Static Standing Fair +   Dynamic Standing Fair   Ambulatory Poor +   Endurance Deficit   Endurance Deficit Yes   Activity Tolerance   Activity Tolerance Patient limited by fatigue   Medical Staff Made Aware OT   Nurse Made Aware nurse approved therapy session   Exercises   Ankle Pumps Sitting;Bilateral  (30reps x 3 sets)   Assessment   Prognosis Good   Problem List Decreased strength;Decreased endurance; Impaired balance;Decreased mobility   Assessment Pt presents to therapy today with reduced mobility, limited endurance, high risk of falling, pain in back, gait abnormalities  These impairments limit the patient by requiring assistance for mobility and places her at an increased risk of falling  Pt would benefit from continued skilled therapy while in the hospital to improve overall mobility and work towards a safe d/c  Recommend rehab  At end of session patient was left seated with call bell within reach      Barriers to Discharge Decreased caregiver support   Goals   STG Expiration Date 03/20/20   Short Term Goal #1 continue to progress towards goals   PT Treatment Day 1   Plan Treatment/Interventions Functional transfer training;LE strengthening/ROM; Therapeutic exercise; Endurance training;Gait training;Bed mobility; Equipment eval/education   PT Frequency Other (Comment)  (3-5xwk)   Recommendation   Recommendation Post acute IP rehab   PT - OK to Discharge Yes   Additional Comments if to rehab   Carlos Alberto Batres, Pt, DPT

## 2020-03-17 PROCEDURE — 97110 THERAPEUTIC EXERCISES: CPT

## 2020-03-17 PROCEDURE — 99232 SBSQ HOSP IP/OBS MODERATE 35: CPT | Performed by: HOSPITALIST

## 2020-03-17 RX ADMIN — HEPARIN SODIUM 7500 UNITS: 5000 INJECTION INTRAVENOUS; SUBCUTANEOUS at 21:52

## 2020-03-17 RX ADMIN — PREGABALIN 100 MG: 100 CAPSULE ORAL at 10:03

## 2020-03-17 RX ADMIN — OXYBUTYNIN CHLORIDE 5 MG: 5 TABLET ORAL at 10:02

## 2020-03-17 RX ADMIN — PRAZOSIN HYDROCHLORIDE 2 MG: 2 CAPSULE ORAL at 21:52

## 2020-03-17 RX ADMIN — MELOXICAM 15 MG: 7.5 TABLET ORAL at 10:00

## 2020-03-17 RX ADMIN — METHYLPREDNISOLONE 20 MG: 16 TABLET ORAL at 10:03

## 2020-03-17 RX ADMIN — DULOXETINE HYDROCHLORIDE 30 MG: 30 CAPSULE, DELAYED RELEASE ORAL at 10:02

## 2020-03-17 RX ADMIN — PREGABALIN 100 MG: 100 CAPSULE ORAL at 17:16

## 2020-03-17 RX ADMIN — TRAZODONE HYDROCHLORIDE 150 MG: 100 TABLET ORAL at 21:50

## 2020-03-17 RX ADMIN — BISACODYL 10 MG: 5 TABLET, COATED ORAL at 10:00

## 2020-03-17 RX ADMIN — DICLOFENAC 4 G: 10 GEL TOPICAL at 13:36

## 2020-03-17 RX ADMIN — HYDROXYZINE HYDROCHLORIDE 50 MG: 50 TABLET, FILM COATED ORAL at 21:50

## 2020-03-17 RX ADMIN — OXYCODONE HYDROCHLORIDE 7.5 MG: 5 TABLET ORAL at 05:46

## 2020-03-17 RX ADMIN — BUSPIRONE HYDROCHLORIDE 5 MG: 5 TABLET ORAL at 10:01

## 2020-03-17 RX ADMIN — MORPHINE SULFATE 15 MG: 15 TABLET, FILM COATED, EXTENDED RELEASE ORAL at 21:50

## 2020-03-17 RX ADMIN — BUSPIRONE HYDROCHLORIDE 5 MG: 5 TABLET ORAL at 17:16

## 2020-03-17 RX ADMIN — MORPHINE SULFATE 15 MG: 15 TABLET, FILM COATED, EXTENDED RELEASE ORAL at 09:59

## 2020-03-17 RX ADMIN — Medication 1 SPRAY: at 19:44

## 2020-03-17 RX ADMIN — QUETIAPINE FUMARATE 300 MG: 300 TABLET, EXTENDED RELEASE ORAL at 21:50

## 2020-03-17 RX ADMIN — BUSPIRONE HYDROCHLORIDE 5 MG: 5 TABLET ORAL at 21:50

## 2020-03-17 RX ADMIN — SENNOSIDES AND DOCUSATE SODIUM 1 TABLET: 8.6; 5 TABLET ORAL at 10:00

## 2020-03-17 RX ADMIN — OXYBUTYNIN CHLORIDE 5 MG: 5 TABLET ORAL at 17:16

## 2020-03-17 RX ADMIN — LACTULOSE 20 G: 10 SOLUTION ORAL at 10:00

## 2020-03-17 RX ADMIN — OXYCODONE HYDROCHLORIDE 7.5 MG: 5 TABLET ORAL at 16:46

## 2020-03-17 RX ADMIN — DICLOFENAC 4 G: 10 GEL TOPICAL at 21:48

## 2020-03-17 RX ADMIN — HEPARIN SODIUM 7500 UNITS: 5000 INJECTION INTRAVENOUS; SUBCUTANEOUS at 05:37

## 2020-03-17 RX ADMIN — PANTOPRAZOLE SODIUM 20 MG: 20 TABLET, DELAYED RELEASE ORAL at 05:37

## 2020-03-17 RX ADMIN — PREGABALIN 100 MG: 100 CAPSULE ORAL at 21:50

## 2020-03-17 RX ADMIN — DULOXETINE HYDROCHLORIDE 60 MG: 60 CAPSULE, DELAYED RELEASE ORAL at 21:50

## 2020-03-17 RX ADMIN — LORATADINE 10 MG: 10 TABLET ORAL at 10:00

## 2020-03-17 RX ADMIN — DICLOFENAC 4 G: 10 GEL TOPICAL at 10:03

## 2020-03-17 RX ADMIN — MELATONIN 6 MG: at 21:50

## 2020-03-17 RX ADMIN — LIDOCAINE 1 PATCH: 50 PATCH TOPICAL at 21:52

## 2020-03-17 RX ADMIN — MELATONIN 1000 UNITS: at 10:00

## 2020-03-17 RX ADMIN — PROPRANOLOL HYDROCHLORIDE 20 MG: 20 TABLET ORAL at 17:16

## 2020-03-17 RX ADMIN — HEPARIN SODIUM 7500 UNITS: 5000 INJECTION INTRAVENOUS; SUBCUTANEOUS at 13:38

## 2020-03-17 NOTE — PROGRESS NOTES
INTERNAL MEDICINE RESIDENCY PROGRESS NOTE     Name: Hanna Manuel   Age & Sex: 62 y o  female   MRN: [id number]  Unit/Bed#: [place] 822-01   Encounter: [id number]  Team: SOD Team A    PATIENT INFORMATION     Name: Hanna Manuel   Age & Sex: 62 y o  female   MRN: [id number]  Hospital Stay Days: 7    ASSESSMENT/PLAN     Principal Problem:    Intractable low back pain  Active Problems:    Chronic pain disorder    Major depressive disorder, recurrent episode, moderate degree ([facility])    Bipolar II disorder ([facility])    Hypertension    Migraine    Tardive dyskinesia      * Intractable low back pain  Assessment & Plan  History of chronic low back pain and bilateral lower extremity pain  Patient came in today for worsening of her pain  Labs significant for CRP for 48 2, sed rate 16, alk phos 131  CT scan Lucency surrounding the superior pedicle screw on the right at T7 with paraspinal soft tissue thickening and endplate erosion   Findings suggest loosening and possible infection   Mild irregularity of endplates is seen at S2-0 and T7-8   The spinal canal is not well evaluated this level due to streak artifact versus any canal narrowing is not excluded  Lucency surrounds the left S1 pedicle screw which also suggests possible loosening and degenerative changes at L5-S1  Thoracic MRI limited, unable to rule out infectious etiology due to poor study    Lumbar MRI, limited however no gross pathologic processes  Plan:  · Pain control:  Oxycodone 7 5 mg Q 6 p r n  for moderate pain and severe pain, MS Contin 15 mg bid   · Neurosurgical outpatient follow-up for possible spinal stimulator   · Heating pad prn   · Lidocain patch at T12 paraspinal   · Medrol dose pack    Major depressive disorder, recurrent episode, moderate degree ([facility])  Assessment & Plan  Will continue Cymbalta 90 mg total for history of depression    Will hold on to risperidone and quetiapine since patient has severe tardive dyskinesia on physical exam     Chronic pain disorder  Assessment & Plan  Patient with long history of chronic back pain  Follows up with Neurosurgery outpatient  Patient recently evaluated by neurosurgery for possible spinal stimulator  Admits to trying Lyrica, ibuprofen, Tylenol, Cymbalta and was recently prescribed morphine 15 mg b i d  (60 tablets total) by her PCP  Patient admits that none of the pain medications have helped her with her pain so far  Mild feverresolved as of 3/10/2020  Assessment & Plan  With fever of 102 8  Repeat temperature 100 6°  Down trending blood pressure but no episodes of hypotension  Patient denied any URI symptoms or urinary symptoms  Unclear source at this time  Blood  Cultures negative at 24 hours  Plan:  · Continue to monitor fever and WBC daily    Tardive dyskinesia  Assessment & Plan  Patient currently on multiple psychiatric medications  Medication reconciliation is limited because patient does not know which medications she is taking  Patient was found to have tardive dyskinesia on physical exam   Will hold Seroquel, Klonopin and Lamictal   Call her pharmacy before initiating any medications  Migraine  Assessment & Plan  Admits to having her typical migraine headaches on presentation  Will continue home dose of Propanolol 20 mg b i d  Will give IV magnesium and Toradol 15 mg b i d  Hypertension  Assessment & Plan  Will continue amlodipine 10 mg daily  Bipolar II disorder [facility])  Assessment & Plan  Call pharmacy before initiating any psychiatric medication  Patient needs close outpatient psychiatric follow-up  Will hold on to Klonopin, Lamictal, Seroquel, risperidone  Disposition: Medically stable, pending placement      SUBJECTIVE     Patient seen and examined  No acute events overnight  Patient continues to complain of pain however sitting comfortably in bed   She did not request additional pain medications from the overnight team Patient resting comfortably in bed  Encouraged patient to work with PT/OT     OBJECTIVE     Vitals:    20 2135 20 2338 20 0811 20 1001   BP: 153/98 148/87 125/80 107/65   BP Location:       Pulse:   68    Resp:  18 18    Temp:  98 4 °F (36 9 °C) 98 3 °F (36 8 °C)    TempSrc:       SpO2:   97%    Weight:       Height:          Temperature:   Temp (24hrs), Av 6 °F (37 °C), Min:98 3 °F (36 8 °C), Max:99 1 °F (37 3 °C)    Temperature: 98 3 °F (36 8 °C)  Intake & Output:  I/O       03/15 0701 -  0700  07 -  0700  07 -  0700    P  O  580 400     I V  (mL/kg)       Total Intake(mL/kg) 580 (6 2) 400 (4 3)     Urine (mL/kg/hr) 2200 (1) 1500 (0 7)     Stool       Total Output 2200 1500     Net -1620 -1100            Unmeasured Urine Occurrence 4 x          Weights:   IBW: 45 5 kg    Body mass index is 40 39 kg/m²  Weight (last 2 days)     None        Physical Exam   Constitutional: She appears well-developed and well-nourished  HENT:   Head: Normocephalic and atraumatic  Mouth/Throat: Oropharynx is clear and moist    Eyes: Conjunctivae are normal  No scleral icterus  Neck: No JVD present  Cardiovascular: Normal rate, regular rhythm and normal heart sounds  Pulmonary/Chest: Effort normal and breath sounds normal    Abdominal: Soft  Bowel sounds are normal  She exhibits distension  There is no tenderness  Musculoskeletal: Normal range of motion  She exhibits no edema or tenderness  Neurological: She is alert  No cranial nerve deficit or sensory deficit  She exhibits normal muscle tone  Skin: Skin is warm and dry  Nursing note and vitals reviewed  LABORATORY DATA     Labs: I have personally reviewed pertinent reports  Invalid input(s):  EOSPCT       Invalid input(s): LABALBU                         IMAGING & DIAGNOSTIC TESTING     Radiology Results: I have personally reviewed pertinent reports    Ct Spine Thoracic Wo Contrast    Addendum Date: 3/8/2020    ADDENDUM: There is a typo in the conclusion  The 4th sentence should read "the spinal canal is not well evaluated at this level (T7) due to streak artifact; consequently, soft tissue canal narrowing is not excluded "  Clinical correlation with any neurologic signs including lower extremity weakness may be useful  Result Date: 3/8/2020  Impression: Lucency surrounding the superior pedicle screw on the right at T7 with paraspinal soft tissue thickening and endplate erosion  Findings suggest loosening and possible infection  Mild irregularity of endplates is seen at O5-7 and T7-8  The spinal canal  is not well evaluated this level due to streak artifact versus any canal narrowing is not excluded  Some endplate irregularity is seen at L5-S1 with also sclerosis which is more likely degenerative although some inflammatory component is not excluded  Lucency surrounds the left S1 pedicle screw which also suggests possible loosening  The right screw appears to pass into the right S1 foramen  Streak artifact does limit this study making assessment of canal narrowing difficult elsewhere  Possible erosion involving the right facet of L3-L4  Targeted MRI of the thoracic spine still may be useful with contrast to assess for any new marrow edema as compared to January as well as as well as any new canal involvement but may be limited due to streak artifact  Nuclear medicine study may also be useful  If prior CTs of the spine postoperatively are available that would be very helpful and an addendum could be rendered  Results were discussed with Dr Elier Hernández in the emergency room Workstation performed: [id number]     Ct Spine Lumbar Wo Contrast    Addendum Date: 3/8/2020    ADDENDUM: There is a typo in the conclusion    The 4th sentence should read "the spinal canal is not well evaluated at this level (T7) due to streak artifact; consequently, soft tissue canal narrowing is not excluded "  Clinical correlation with any neurologic signs including lower extremity weakness may be useful  Result Date: 3/8/2020  Impression: Lucency surrounding the superior pedicle screw on the right at T7 with paraspinal soft tissue thickening and endplate erosion  Findings suggest loosening and possible infection  Mild irregularity of endplates is seen at A9-9 and T7-8  The spinal canal  is not well evaluated this level due to streak artifact versus any canal narrowing is not excluded  Some endplate irregularity is seen at L5-S1 with also sclerosis which is more likely degenerative although some inflammatory component is not excluded  Lucency surrounds the left S1 pedicle screw which also suggests possible loosening  The right screw appears to pass into the right S1 foramen  Streak artifact does limit this study making assessment of canal narrowing difficult elsewhere  Possible erosion involving the right facet of L3-L4  Targeted MRI of the thoracic spine still may be useful with contrast to assess for any new marrow edema as compared to January as well as as well as any new canal involvement but may be limited due to streak artifact  Nuclear medicine study may also be useful  If prior CTs of the spine postoperatively are available that would be very helpful and an addendum could be rendered  Results were discussed with Dr Connor Mustafa in the emergency room Workstation performed: [id number]     Mri Thoracic Spine Wo Contrast    Result Date: 3/10/2020  Impression: Markedly limited exam of the thoracic spine owing to motion and ferromagnetic posterior fixation hardware  Patient is fused from  T7 to the  Solitary, right T7 screw is loose based on CT  Although discitis osteomyelitis at T6-T7 cannot be excluded with certainty, this is the transition point where one would expect significant degenerative changes at the margin between the relatively mobile spine and  the long fixed segment extending to the sacrum   Workstation performed: [id number]     Mri Lumbar Spine NAVA Myles Contrast    Result Date: 3/12/2020  Impression: Markedly limited exam owing to extensive surgical hardware  Enhancement and edema which occur within the vertebral bodies and disc at the T6-T7 level are nonspecific  There is no paraspinal phlegmon  This is favored to be degenerative in nature  However, serologic exclusion suggested with ESR, CRP and WBC  There does not appear to be significant lumbar spine canal stenosis  Workstation performed: [id number]     Other Diagnostic Testing: I have personally reviewed pertinent reports      ACTIVE MEDICATIONS     Current Facility-Administered Medications   Medication Dose Route Frequency    amLODIPine (NORVASC) tablet 5 mg  5 mg Oral Daily    bisacodyl (DULCOLAX) EC tablet 10 mg  10 mg Oral Daily    busPIRone (BUSPAR) tablet 5 mg  5 mg Oral TID    cholecalciferol (VITAMIN D3) tablet 1,000 Units  1,000 Units Oral Daily    cyclobenzaprine (FLEXERIL) tablet 5 mg  5 mg Oral TID PRN    diclofenac sodium (VOLTAREN) 1 % topical gel 4 g  4 g Topical 4x Daily    DULoxetine (CYMBALTA) delayed release capsule 30 mg  30 mg Oral Daily    DULoxetine (CYMBALTA) delayed release capsule 60 mg  60 mg Oral HS    heparin (porcine) subcutaneous injection 7,500 Units  7,500 Units Subcutaneous Q8H [facility]    hydrOXYzine HCL (ATARAX) tablet 50 mg  50 mg Oral HS    lactulose 20 g/30 mL oral solution 20 g  20 g Oral Daily    lidocaine (LIDODERM) 5 % patch 1 patch  1 patch Topical HS    loratadine (CLARITIN) tablet 10 mg  10 mg Oral Daily    LORazepam (ATIVAN) tablet 0 5 mg  0 5 mg Oral Q8H PRN    melatonin tablet 6 mg  6 mg Oral HS    meloxicam (MOBIC) tablet 15 mg  15 mg Oral Daily    [START ON 3/18/2020] methylPREDNISolone (MEDROL) tablet 16 mg  16 mg Oral Daily    Followed by   Kimberly Coleman ON 3/19/2020] methylprednisolone (MEDROL) tablet 12 mg  12 mg Oral Daily    Followed by   Kimberly Coleman ON 3/20/2020] methylprednisolone (MEDROL) tablet 8 mg  8 mg Oral Daily    Followed by   Kimberly Coleman ON 3/21/2020] methylprednisolone (MEDROL) tablet 4 mg  4 mg Oral Daily    morphine (MS CONTIN) ER tablet 15 mg  15 mg Oral Q12H [address]    ondansetron (ZOFRAN) injection 4 mg  4 mg Intravenous Q6H PRN    oxybutynin (DITROPAN) tablet 5 mg  5 mg Oral BID    oxyCODONE (ROXICODONE) IR tablet 7 5 mg  7 5 mg Oral Q6H PRN    pantoprazole (PROTONIX) EC tablet 20 mg  20 mg Oral Early Morning    prazosin (MINIPRESS) capsule 2 mg  2 mg Oral Daily    pregabalin (LYRICA) capsule 100 mg  100 mg Oral TID    propranolol (INDERAL) tablet 20 mg  20 mg Oral BID    QUEtiapine (SEROquel XR) 24 hr tablet 300 mg  300 mg Oral HS    senna-docusate sodium (SENOKOT S) 8 6-50 mg per tablet 1 tablet  1 tablet Oral Daily    traZODone (DESYREL) tablet 150 mg  150 mg Oral HS       VTE Pharmacologic Prophylaxis: Heparin  VTE Mechanical Prophylaxis: sequential compression device    Portions of the record may have been created with voice recognition software  Occasional wrong word or "sound a like" substitutions may have occurred due to the inherent limitations of voice recognition software    Read the chart carefully and recognize, using context, where substitutions have occurred   ==  Laroy Curling, MD  [facility]  Internal Medicine Residency PGY-1

## 2020-03-17 NOTE — PHYSICAL THERAPY NOTE
Physical Therapy Treatment note     Patient Name: Roseann BRANDT Date: 3/17/2020     Problem List  Principal Problem:    Intractable low back pain  Active Problems:    Chronic pain disorder    Bipolar II disorder ([facility])    Hypertension    Major depressive disorder, recurrent episode, moderate degree ([facility])    Migraine    Tardive dyskinesia       Past Medical History  Past Medical History:   Diagnosis Date    Acid reflux     Anxiety     RESOLVED: [id number]    Arthritis     Bipolar 2 disorder ([facility])     FOLLOWS WITH PSYCHIATRIST  CONTINUE LAMOTRIGINE; RESOLVED: [id number]    Depression     Familial tremor     both hands    Fibromyalgia     LAST ASSESSED: [id number]    Hearing aid worn     left ear    [place] (hard of hearing)     left ear    Hypertension     Left-sided weakness     Lower back pain     Memory loss of unknown cause     long and short term    Migraine     Overactive bladder     Panic attack     Post traumatic stress disorder     Seasonal allergies     Stroke 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    CONT ASA 81 MG DAILY; RESOLVED: [id number]    Urinary incontinence     Wears dentures     partial lower / full upper    Wears glasses         Past Surgical History  Past Surgical History:   Procedure Laterality Date    BACK SURGERY       SECTION      COLONOSCOPY      RESOLVED: [id number]    EAR SURGERY      HYSTERECTOMY  2004    MYRINGOTOMY W/ TUBES Left     NECK SURGERY  2019    SD CYSTOURETHROSCOPY N/A 2016    Procedure: CYSTOSCOPY, BOTOX INJECTION;  Surgeon: Lakesha Starr MD;  [facility];  Service: Gynecology    TONSILLECTOMY     [address]        20 1041   Pain Assessment   Pain Assessment Tool Pain Assessment not indicated - pt denies pain   Pain Score No Pain   Restrictions/Precautions   Weight Bearing Precautions Per Order No   Other Precautions Cognitive; Fall Risk   General   Chart Reviewed Yes   Family/Caregiver Present No   Cognition   Overall Cognitive Status Impaired   Arousal/Participation Alert   Attention Within functional limits   Orientation Level Oriented X4   Memory Within functional limits   Following Commands Follows all commands and directions without difficulty   Bed Mobility   Supine to Sit 5  Supervision   Sit to Supine 5  Supervision   Transfers   Sit to Stand 5  Supervision   Stand to Sit 5  Supervision   Ambulation/Elevation   Gait pattern Shuffling; Forward Flexion   Gait Assistance 5  Supervision   Assistive Device Rolling walker   Distance 4ftx2 (to and from bed)   Balance   Static Sitting Good   Dynamic Sitting Good   Static Standing Fair +   Dynamic Standing Fair +   Ambulatory Fair   Endurance Deficit   Endurance Deficit Yes   Activity Tolerance   Activity Tolerance Patient limited by fatigue   Nurse Made Aware nurse approved therapy session   Exercises   Hip Flexion Sitting;Bilateral  (12 reps x 3 sets)   Hip Adduction Sitting;Bilateral  (30 repsx 3 sets )   Knee AROM Long Arc Quad Sitting;Bilateral  (12 reps x 3 sets )   Ankle Pumps Sitting;Bilateral  (30 reps x 3 set )   Balance training  10x3 sit to stands   Assessment   Prognosis Good   Problem List Decreased strength; Impaired balance;Decreased endurance;Decreased mobility   Assessment Pt presents to therapy today with reduced mobility, limited endurance, high risk of falling, back pain  These impairments limit the patient by requiring assistance for mobility and places her at an increased risk of falling  Pt would benefit from continued skilled therapy while in the hospital to improve overall mobility and work towards a safe d/c  Recommend rehab  At end of session patient was left seated with call bell within reach   Per doctor staff should wear a mask due to the patient reporting coughing over night, shivers over night and aching feeling  Barriers to Discharge Decreased caregiver support   Goals   STG Expiration Date 03/20/20   Short Term Goal #1 In 10 days pt will be able to: 1  Demonstrate ability to perform all aspects of bed mobility indepenently to increase functional independence  2  Perform functional transfers independently with RW to facilitate safe return to previous living environment  3   Ambulate 100 ft with RW and supervision with stable vitals to improve safety with household distances and reduce fall risk  4  Climb 1 steps with supervision to simulate entrance to home  5  Improve LE strength grades by 1 to increase ease of functional mobility with transfers and gait  6  Pt will demonstrate improved balance by one grade order to decrease risk of falls  PT Treatment Day 2   Plan   Treatment/Interventions Functional transfer training;LE strengthening/ROM; Therapeutic exercise; Endurance training;Gait training;Bed mobility; Equipment eval/education   Progress Progressing toward goals   PT Frequency Other (Comment)  (3-5xwk)   Recommendation   Recommendation Post acute IP rehab   PT - OK to Discharge Yes   Additional Comments if to rehab   Bacilio Nuno Pt, DPT

## 2020-03-17 NOTE — SOCIAL WORK
Patient pending placement at [facility]  HUNTER notified [facility] that OPTIONS process was initiated  HUNTER Lockett, RN spoke to [facility] 3/17 regarding assessment likely occurring over the phone  HUNTER called [facility] and left  for [employer], assigned to patient's case

## 2020-03-17 NOTE — PLAN OF CARE
Problem: PHYSICAL THERAPY ADULT  Goal: Performs mobility at highest level of function for planned discharge setting  See evaluation for individualized goals  Description  Treatment/Interventions: Functional transfer training, LE strengthening/ROM, Elevations, Therapeutic exercise, Endurance training, Patient/family training, Equipment eval/education, Bed mobility, Gait training          See flowsheet documentation for full assessment, interventions and recommendations  Outcome: Progressing  Note:   Prognosis: Good  Problem List: Decreased strength, Impaired balance, Decreased endurance, Decreased mobility  Assessment: Pt presents to therapy today with reduced mobility, limited endurance, high risk of falling, back pain  These impairments limit the patient by requiring assistance for mobility and places her at an increased risk of falling  Pt would benefit from continued skilled therapy while in the hospital to improve overall mobility and work towards a safe d/c  Recommend rehab  At end of session patient was left seated with call bell within reach  Per doctor staff should wear a mask due to the patient reporting coughing over night, shivers over night and aching feeling  Barriers to Discharge: Decreased caregiver support     Recommendation: Post acute IP rehab     PT - OK to Discharge: Yes    See flowsheet documentation for full assessment

## 2020-03-18 LAB
ANION GAP SERPL CALCULATED.3IONS-SCNC: 6 MMOL/L (ref 4–13)
BUN SERPL-MCNC: 14 MG/DL (ref 5–25)
CALCIUM SERPL-MCNC: 9 MG/DL (ref 8.3–10.1)
CHLORIDE SERPL-SCNC: 110 MMOL/L (ref 100–108)
CO2 SERPL-SCNC: 29 MMOL/L (ref 21–32)
CREAT SERPL-MCNC: 0.67 MG/DL (ref 0.6–1.3)
ERYTHROCYTE [DISTWIDTH] IN BLOOD BY AUTOMATED COUNT: 13.2 % (ref 11.6–15.1)
GFR SERPL CREATININE-BSD FRML MDRD: 114 ML/MIN/1.73SQ M
GLUCOSE SERPL-MCNC: 89 MG/DL (ref 65–140)
HCT VFR BLD AUTO: 40.7 % (ref 34.8–46.1)
HGB BLD-MCNC: 13.7 G/DL (ref 11.5–15.4)
MCH RBC QN AUTO: 30.3 PG (ref 26.8–34.3)
MCHC RBC AUTO-ENTMCNC: 33.7 G/DL (ref 31.4–37.4)
MCV RBC AUTO: 90 FL (ref 82–98)
PLATELET # BLD AUTO: 323 THOUSANDS/UL (ref 149–390)
PMV BLD AUTO: 9.8 FL (ref 8.9–12.7)
POTASSIUM SERPL-SCNC: 3.7 MMOL/L (ref 3.5–5.3)
RBC # BLD AUTO: 4.52 MILLION/UL (ref 3.81–5.12)
SODIUM SERPL-SCNC: 145 MMOL/L (ref 136–145)
WBC # BLD AUTO: 6.38 THOUSAND/UL (ref 4.31–10.16)

## 2020-03-18 PROCEDURE — 85027 COMPLETE CBC AUTOMATED: CPT | Performed by: STUDENT IN AN ORGANIZED HEALTH CARE EDUCATION/TRAINING PROGRAM

## 2020-03-18 PROCEDURE — 97116 GAIT TRAINING THERAPY: CPT

## 2020-03-18 PROCEDURE — 80048 BASIC METABOLIC PNL TOTAL CA: CPT | Performed by: STUDENT IN AN ORGANIZED HEALTH CARE EDUCATION/TRAINING PROGRAM

## 2020-03-18 PROCEDURE — 99232 SBSQ HOSP IP/OBS MODERATE 35: CPT | Performed by: HOSPITALIST

## 2020-03-18 RX ADMIN — PREGABALIN 100 MG: 100 CAPSULE ORAL at 21:05

## 2020-03-18 RX ADMIN — BUSPIRONE HYDROCHLORIDE 5 MG: 5 TABLET ORAL at 21:06

## 2020-03-18 RX ADMIN — AMLODIPINE BESYLATE 5 MG: 5 TABLET ORAL at 10:05

## 2020-03-18 RX ADMIN — MELATONIN 6 MG: at 21:05

## 2020-03-18 RX ADMIN — QUETIAPINE FUMARATE 300 MG: 300 TABLET, EXTENDED RELEASE ORAL at 21:06

## 2020-03-18 RX ADMIN — PREGABALIN 100 MG: 100 CAPSULE ORAL at 17:43

## 2020-03-18 RX ADMIN — PROPRANOLOL HYDROCHLORIDE 20 MG: 20 TABLET ORAL at 10:06

## 2020-03-18 RX ADMIN — HEPARIN SODIUM 7500 UNITS: 5000 INJECTION INTRAVENOUS; SUBCUTANEOUS at 13:31

## 2020-03-18 RX ADMIN — HEPARIN SODIUM 7500 UNITS: 5000 INJECTION INTRAVENOUS; SUBCUTANEOUS at 05:05

## 2020-03-18 RX ADMIN — LORATADINE 10 MG: 10 TABLET ORAL at 10:06

## 2020-03-18 RX ADMIN — ONDANSETRON 4 MG: 2 INJECTION INTRAMUSCULAR; INTRAVENOUS at 12:19

## 2020-03-18 RX ADMIN — BUSPIRONE HYDROCHLORIDE 5 MG: 5 TABLET ORAL at 17:43

## 2020-03-18 RX ADMIN — METHYLPREDNISOLONE 16 MG: 16 TABLET ORAL at 10:06

## 2020-03-18 RX ADMIN — HEPARIN SODIUM 7500 UNITS: 5000 INJECTION INTRAVENOUS; SUBCUTANEOUS at 21:06

## 2020-03-18 RX ADMIN — Medication 1 SPRAY: at 17:42

## 2020-03-18 RX ADMIN — TRAZODONE HYDROCHLORIDE 150 MG: 100 TABLET ORAL at 21:06

## 2020-03-18 RX ADMIN — LACTULOSE 20 G: 10 SOLUTION ORAL at 10:04

## 2020-03-18 RX ADMIN — DULOXETINE HYDROCHLORIDE 60 MG: 60 CAPSULE, DELAYED RELEASE ORAL at 21:05

## 2020-03-18 RX ADMIN — LIDOCAINE 1 PATCH: 50 PATCH TOPICAL at 21:12

## 2020-03-18 RX ADMIN — LORAZEPAM 0.5 MG: 0.5 TABLET ORAL at 13:34

## 2020-03-18 RX ADMIN — OXYBUTYNIN CHLORIDE 5 MG: 5 TABLET ORAL at 10:05

## 2020-03-18 RX ADMIN — BUSPIRONE HYDROCHLORIDE 5 MG: 5 TABLET ORAL at 10:06

## 2020-03-18 RX ADMIN — MORPHINE SULFATE 15 MG: 15 TABLET, FILM COATED, EXTENDED RELEASE ORAL at 21:06

## 2020-03-18 RX ADMIN — DULOXETINE HYDROCHLORIDE 30 MG: 30 CAPSULE, DELAYED RELEASE ORAL at 10:04

## 2020-03-18 RX ADMIN — BISACODYL 10 MG: 5 TABLET, COATED ORAL at 10:05

## 2020-03-18 RX ADMIN — PANTOPRAZOLE SODIUM 20 MG: 20 TABLET, DELAYED RELEASE ORAL at 05:05

## 2020-03-18 RX ADMIN — MELOXICAM 15 MG: 7.5 TABLET ORAL at 10:05

## 2020-03-18 RX ADMIN — PRAZOSIN HYDROCHLORIDE 2 MG: 2 CAPSULE ORAL at 21:07

## 2020-03-18 RX ADMIN — SENNOSIDES AND DOCUSATE SODIUM 1 TABLET: 8.6; 5 TABLET ORAL at 10:05

## 2020-03-18 RX ADMIN — MELATONIN 1000 UNITS: at 10:06

## 2020-03-18 RX ADMIN — OXYCODONE HYDROCHLORIDE 7.5 MG: 5 TABLET ORAL at 05:04

## 2020-03-18 RX ADMIN — MORPHINE SULFATE 15 MG: 15 TABLET, FILM COATED, EXTENDED RELEASE ORAL at 10:04

## 2020-03-18 RX ADMIN — PROPRANOLOL HYDROCHLORIDE 20 MG: 20 TABLET ORAL at 17:43

## 2020-03-18 RX ADMIN — HYDROXYZINE HYDROCHLORIDE 50 MG: 50 TABLET, FILM COATED ORAL at 21:05

## 2020-03-18 RX ADMIN — PREGABALIN 100 MG: 100 CAPSULE ORAL at 10:05

## 2020-03-18 RX ADMIN — DICLOFENAC 4 G: 10 GEL TOPICAL at 21:09

## 2020-03-18 RX ADMIN — OXYBUTYNIN CHLORIDE 5 MG: 5 TABLET ORAL at 17:43

## 2020-03-18 NOTE — PLAN OF CARE
Problem: Potential for Falls  Goal: Patient will remain free of falls  Description  INTERVENTIONS:  - Assess patient frequently for physical needs  -  Identify cognitive and physical deficits and behaviors that affect risk of falls    -  [place] fall precautions as indicated by assessment   - Educate patient/family on patient safety including physical limitations  - Instruct patient to call for assistance with activity based on assessment  - Modify environment to reduce risk of injury  - Consider OT/PT consult to assist with strengthening/mobility  Outcome: Progressing     Problem: Prexisting or High Potential for Compromised Skin Integrity  Goal: Skin integrity is maintained or improved  Description  INTERVENTIONS:  - Identify patients at risk for skin breakdown  - Assess and monitor skin integrity  - Assess and monitor nutrition and hydration status  - Monitor labs   - Assess for incontinence   - Turn and reposition patient  - Assist with mobility/ambulation  - Relieve pressure over bony prominences  - Avoid friction and shearing  - Provide appropriate hygiene as needed including keeping skin clean and dry  - Evaluate need for skin moisturizer/barrier cream  - Collaborate with interdisciplinary team   - Patient/family teaching  - Consider wound care consult   Outcome: Progressing     Problem: PAIN - ADULT  Goal: Verbalizes/displays adequate comfort level or baseline comfort level  Description  Interventions:  - Encourage patient to monitor pain and request assistance  - Assess pain using appropriate pain scale  - Administer analgesics based on type and severity of pain and evaluate response  - Implement non-pharmacological measures as appropriate and evaluate response  - Consider cultural and social influences on pain and pain management  - Notify physician/advanced practitioner if interventions unsuccessful or patient reports new pain  Outcome: Progressing     Problem: INFECTION - ADULT  Goal: Absence or prevention of progression during hospitalization  Description  INTERVENTIONS:  - Assess and monitor for signs and symptoms of infection  - Monitor lab/diagnostic results  - Monitor all insertion sites, i e  indwelling lines, tubes, and drains  - Monitor endotracheal if appropriate and nasal secretions for changes in amount and color  - [place] appropriate cooling/warming therapies per order  - Administer medications as ordered  - Instruct and encourage patient and family to use good hand hygiene technique  - Identify and instruct in appropriate isolation precautions for identified infection/condition  Outcome: Progressing  Goal: Absence of fever/infection during neutropenic period  Description  INTERVENTIONS:  - Monitor WBC    Outcome: Progressing     Problem: SAFETY ADULT  Goal: Patient will remain free of falls  Description  INTERVENTIONS:  - Assess patient frequently for physical needs  -  Identify cognitive and physical deficits and behaviors that affect risk of falls  -  [place] fall precautions as indicated by assessment   - Educate patient/family on patient safety including physical limitations  - Instruct patient to call for assistance with activity based on assessment  - Modify environment to reduce risk of injury  - Consider OT/PT consult to assist with strengthening/mobility  Outcome: Progressing  Goal: Maintain or return to baseline ADL function  Description  INTERVENTIONS:  - Assess patient frequently for physical needs  -  Identify cognitive and physical deficits and behaviors that affect risk of falls    -  [place] fall precautions as indicated by assessment   - Educate patient/family on patient safety including physical limitations  - Instruct patient to call for assistance with activity based on assessment  - Modify environment to reduce risk of injury  - Consider OT/PT consult to assist with strengthening/mobility  Outcome: Progressing  Goal: Maintain or return mobility status to optimal level  Description  INTERVENTIONS:  -  Assess patient's ability to carry out ADLs; assess patient's baseline for ADL function and identify physical deficits which impact ability to perform ADLs (bathing, care of mouth/teeth, toileting, grooming, dressing, etc )  - Assess/evaluate cause of self-care deficits   - Assess range of motion  - Assess patient's mobility; develop plan if impaired  - Assess patient's need for assistive devices and provide as appropriate  - Encourage maximum independence but intervene and supervise when necessary  - Involve family in performance of ADLs  - Assess for home care needs following discharge   - Consider OT consult to assist with ADL evaluation and planning for discharge  - Provide patient education as appropriate  Outcome: Progressing     Problem: DISCHARGE PLANNING  Goal: Discharge to home or other facility with appropriate resources  Description  INTERVENTIONS:  - Identify barriers to discharge w/patient and caregiver  - Arrange for needed discharge resources and transportation as appropriate  - Identify discharge learning needs (meds, wound care, etc )  - Arrange for interpretive services to assist at discharge as needed  - Refer to Case Management Department for coordinating discharge planning if the patient needs post-hospital services based on physician/advanced practitioner order or complex needs related to functional status, cognitive ability, or social support system  Outcome: Progressing     Problem: Knowledge Deficit  Goal: Patient/family/caregiver demonstrates understanding of disease process, treatment plan, medications, and discharge instructions  Description  Complete learning assessment and assess knowledge base    Interventions:  - Provide teaching at level of understanding  - Provide teaching via preferred learning methods  Outcome: Progressing     Problem: SKIN/TISSUE INTEGRITY - ADULT  Goal: Skin integrity remains intact  Description  INTERVENTIONS  - Identify patients at risk for skin breakdown  - Assess and monitor skin integrity  - Assess and monitor nutrition and hydration status  - Monitor labs (i e  albumin)  - Assess for incontinence   - Turn and reposition patient  - Assist with mobility/ambulation  - Relieve pressure over bony prominences  - Avoid friction and shearing  - Provide appropriate hygiene as needed including keeping skin clean and dry  - Evaluate need for skin moisturizer/barrier cream  - Collaborate with interdisciplinary team (i e  Nutrition, Rehabilitation, etc )   - Patient/family teaching  Outcome: Progressing     Problem: MUSCULOSKELETAL - ADULT  Goal: Maintain or return mobility to safest level of function  Description  INTERVENTIONS:  - Assess patient's ability to carry out ADLs; assess patient's baseline for ADL function and identify physical deficits which impact ability to perform ADLs (bathing, care of mouth/teeth, toileting, grooming, dressing, etc )  - Assess/evaluate cause of self-care deficits   - Assess range of motion  - Assess patient's mobility  - Assess patient's need for assistive devices and provide as appropriate  - Encourage maximum independence but intervene and supervise when necessary  - Involve family in performance of ADLs  - Assess for home care needs following discharge   - Consider OT consult to assist with ADL evaluation and planning for discharge  - Provide patient education as appropriate  Outcome: Progressing  Goal: Maintain proper alignment of affected body part  Description  INTERVENTIONS:  - Support, maintain and protect limb and body alignment  - Provide patient/ family with appropriate education  Outcome: Progressing

## 2020-03-18 NOTE — PHYSICAL THERAPY NOTE
PHYSICAL THERAPY Treatment NOTE    Patient Name: Maih Beal  WOMARGIM'P Date: 3/18/2020        03/18/20 1635   Pain Assessment   Pain Assessment Tool 0-10   Pain Score No Pain   Restrictions/Precautions   Weight Bearing Precautions Per Order No   Other Precautions Cognitive; Fall Risk   General   Chart Reviewed Yes   Additional Pertinent History Pt  is a 61 yo F who presents with intractable low back pain   Family/Caregiver Present No   Cognition   Overall Cognitive Status Impaired   Arousal/Participation Cooperative   Attention Within functional limits   Orientation Level Oriented X4   Memory Within functional limits   Following Commands Follows multistep commands with increased time or repetition   Comments Pt  was identified with full name and birthdate, disorganized thoughts and speech noted throughout session   Subjective   Subjective Pt  agreeable to PT session   Bed Mobility   Supine to Sit 5  Supervision   Additional items Assist x 1; Increased time required;LE management   Additional Comments Pt  seated OOB in recliner following PT session   Transfers   Sit to Stand 5  Supervision   Additional items Assist x 1; Increased time required;Verbal cues  (for hand placement and sequencing)   Stand to Sit 5  Supervision   Additional items Assist x 1; Impulsive;Verbal cues  (to reach back to control descent)   Ambulation/Elevation   Gait pattern Narrow CALLY; Forward Flexion;Decreased foot clearance;Shuffling; Inconsistent zack; Redundant gait at times; Short stride; Step to;Excessively slow   Gait Assistance 5  Supervision   Additional items Assist x 1   Assistive Device Rolling walker   Distance 50'x1, 75'x1, 100'x1   Balance   Static Sitting Good   Dynamic Sitting Good   Static Standing Fair +   Dynamic Standing Fair   Ambulatory Fair -   Endurance Deficit   Endurance Deficit Yes   Endurance Deficit Description postural and gait degradation noted with fatigue   Activity Tolerance   Activity Tolerance Patient limited by fatigue   Medical Staff Made Aware Spoke to CM    Nurse Made Aware Spoke to RN   Assessment   Prognosis Good   Problem List Decreased strength;Decreased range of motion;Decreased endurance;Decreased mobility; Impaired balance;Decreased coordination;Decreased safety awareness;Pain   Assessment Pt  is a 63 yo F who presents with intractable low back pain  Pt  Agreeable to PT session, Pt  Identified with full name and birthdate  Pt  Demonstrated increased functional independence and increased activity tolerance as evidenced above  Pt  Tolerated increased gait distances and increased functional tasks with decreased need for hands on assistance  Pt  Is progressing towards goals, as expected and will benefit from continued skilled therapy to increase functional independence and aid patient in return to PLOF with decreased burden of care  D/C recommendation is Home PT/Home with family support when medically appropriate  Goals   Patient Goals to get home   STG Expiration Date 03/20/20   PT Treatment Day 3   Plan   Treatment/Interventions Functional transfer training;LE strengthening/ROM; Elevations; Therapeutic exercise; Endurance training;Gait training;Bed mobility; Patient/family training;Equipment eval/education;Cognitive reorientation;Spoke to nursing;Spoke to case management   Progress Progressing toward goals   PT Frequency   (3-5x/wk)   Recommendation   Recommendation Home PT; Home with family support   Equipment Recommended Walker   PT - OK to Discharge Yes   Additional Comments when medically appropriate     Junious Mally PT, DPT 3/18/2020

## 2020-03-18 NOTE — PLAN OF CARE
Problem: PHYSICAL THERAPY ADULT  Goal: Performs mobility at highest level of function for planned discharge setting  See evaluation for individualized goals  Description  Treatment/Interventions: Functional transfer training, LE strengthening/ROM, Elevations, Therapeutic exercise, Endurance training, Patient/family training, Equipment eval/education, Bed mobility, Gait training          See flowsheet documentation for full assessment, interventions and recommendations  Outcome: Progressing  Note:   Prognosis: Good  Problem List: Decreased strength, Decreased range of motion, Decreased endurance, Decreased mobility, Impaired balance, Decreased coordination, Decreased safety awareness, Pain  Assessment: Pt  is a 61 yo F who presents with intractable low back pain  Pt  Agreeable to PT session, Pt  Identified with full name and birthdate  Pt  Demonstrated increased functional independence and increased activity tolerance as evidenced above  Pt  Tolerated increased gait distances and increased functional tasks with decreased need for hands on assistance  Pt  Is progressing towards goals, as expected and will benefit from continued skilled therapy to increase functional independence and aid patient in return to PLOF with decreased burden of care  D/C recommendation is Home PT/Home with family support when medically appropriate  Barriers to Discharge: Decreased caregiver support     Recommendation: Home PT, Home with family support     PT - OK to Discharge: Yes    See flowsheet documentation for full assessment

## 2020-03-19 VITALS
BODY MASS INDEX: 40.6 KG/M2 | DIASTOLIC BLOOD PRESSURE: 86 MMHG | HEART RATE: 64 BPM | TEMPERATURE: 97.9 F | WEIGHT: 206.79 LBS | HEIGHT: 60 IN | OXYGEN SATURATION: 98 % | RESPIRATION RATE: 16 BRPM | SYSTOLIC BLOOD PRESSURE: 132 MMHG

## 2020-03-19 PROCEDURE — 99238 HOSP IP/OBS DSCHRG MGMT 30/<: CPT | Performed by: HOSPITALIST

## 2020-03-19 RX ORDER — METHYLPREDNISOLONE 8 MG/1
8 TABLET ORAL DAILY
Qty: 1 TABLET | Refills: 0 | Status: SHIPPED | OUTPATIENT
Start: 2020-03-20 | End: 2020-03-21

## 2020-03-19 RX ORDER — OXYCODONE HYDROCHLORIDE 15 MG/1
7.5 TABLET ORAL EVERY 6 HOURS PRN
Qty: 14 TABLET | Refills: 0 | Status: SHIPPED | OUTPATIENT
Start: 2020-03-19 | End: 2020-03-26

## 2020-03-19 RX ORDER — LANOLIN ALCOHOL/MO/W.PET/CERES
6 CREAM (GRAM) TOPICAL
Qty: 60 TABLET | Refills: 0 | Status: SHIPPED | OUTPATIENT
Start: 2020-03-19 | End: 2020-03-23 | Stop reason: SDUPTHER

## 2020-03-19 RX ORDER — LORATADINE 10 MG/1
10 TABLET ORAL DAILY
Qty: 30 TABLET | Refills: 1 | Status: SHIPPED | OUTPATIENT
Start: 2020-03-19 | End: 2020-06-03 | Stop reason: SDUPTHER

## 2020-03-19 RX ORDER — TRAZODONE HYDROCHLORIDE 150 MG/1
150 TABLET ORAL
Qty: 30 TABLET | Refills: 1 | Status: ON HOLD | OUTPATIENT
Start: 2020-03-19 | End: 2020-09-09

## 2020-03-19 RX ORDER — OXYCODONE HYDROCHLORIDE 15 MG/1
7.5 TABLET ORAL EVERY 6 HOURS PRN
Qty: 30 TABLET | Refills: 0 | Status: CANCELLED | OUTPATIENT
Start: 2020-03-19 | End: 2020-03-29

## 2020-03-19 RX ORDER — LIDOCAINE 50 MG/G
1 PATCH TOPICAL
Qty: 30 PATCH | Refills: 0 | Status: SHIPPED | OUTPATIENT
Start: 2020-03-19 | End: 2020-03-19

## 2020-03-19 RX ORDER — HYDROXYZINE 50 MG/1
50 TABLET, FILM COATED ORAL
Qty: 30 TABLET | Refills: 0 | Status: SHIPPED | OUTPATIENT
Start: 2020-03-19 | End: 2020-03-23 | Stop reason: SDUPTHER

## 2020-03-19 RX ORDER — METHYLPREDNISOLONE 4 MG/1
4 TABLET ORAL DAILY
Qty: 1 TABLET | Refills: 0 | Status: SHIPPED | OUTPATIENT
Start: 2020-03-21 | End: 2020-03-22

## 2020-03-19 RX ORDER — LIDOCAINE 50 MG/G
1 PATCH TOPICAL
Qty: 30 PATCH | Refills: 0 | Status: SHIPPED | OUTPATIENT
Start: 2020-03-19 | End: 2020-07-16

## 2020-03-19 RX ORDER — OXYCODONE HYDROCHLORIDE 15 MG/1
7.5 TABLET ORAL EVERY 6 HOURS PRN
Qty: 14 TABLET | Refills: 0 | Status: SHIPPED | OUTPATIENT
Start: 2020-03-19 | End: 2020-03-19

## 2020-03-19 RX ORDER — METHYLPREDNISOLONE 4 MG/1
12 TABLET ORAL DAILY
Qty: 3 TABLET | Refills: 0 | Status: CANCELLED | OUTPATIENT
Start: 2020-03-19 | End: 2020-03-20

## 2020-03-19 RX ORDER — AMLODIPINE BESYLATE 5 MG/1
5 TABLET ORAL DAILY
Qty: 30 TABLET | Refills: 1 | Status: SHIPPED | OUTPATIENT
Start: 2020-03-19 | End: 2020-03-25 | Stop reason: SDUPTHER

## 2020-03-19 RX ORDER — OXYBUTYNIN CHLORIDE 5 MG/1
5 TABLET ORAL 2 TIMES DAILY
Qty: 60 TABLET | Refills: 1 | Status: SHIPPED | OUTPATIENT
Start: 2020-03-19 | End: 2020-03-25 | Stop reason: SDUPTHER

## 2020-03-19 RX ORDER — PRAZOSIN HYDROCHLORIDE 2 MG/1
2 CAPSULE ORAL DAILY
Qty: 30 CAPSULE | Refills: 1 | Status: SHIPPED | OUTPATIENT
Start: 2020-03-19 | End: 2020-04-10

## 2020-03-19 RX ORDER — MELOXICAM 15 MG/1
15 TABLET ORAL DAILY
Qty: 30 TABLET | Refills: 0 | Status: SHIPPED | OUTPATIENT
Start: 2020-03-19 | End: 2020-03-23 | Stop reason: SDUPTHER

## 2020-03-19 RX ORDER — CYCLOBENZAPRINE HCL 5 MG
5 TABLET ORAL 3 TIMES DAILY PRN
Qty: 30 TABLET | Refills: 1 | Status: SHIPPED | OUTPATIENT
Start: 2020-03-19 | End: 2020-04-08 | Stop reason: SDUPTHER

## 2020-03-19 RX ORDER — LACTULOSE 20 G/30ML
20 SOLUTION ORAL DAILY
Qty: 900 ML | Refills: 0 | Status: SHIPPED | OUTPATIENT
Start: 2020-03-19 | End: 2020-03-23 | Stop reason: SDUPTHER

## 2020-03-19 RX ORDER — MELATONIN
1000 DAILY
Qty: 30 TABLET | Refills: 1 | Status: SHIPPED | OUTPATIENT
Start: 2020-03-19 | End: 2020-07-20

## 2020-03-19 RX ADMIN — PREGABALIN 100 MG: 100 CAPSULE ORAL at 08:45

## 2020-03-19 RX ADMIN — DULOXETINE HYDROCHLORIDE 30 MG: 30 CAPSULE, DELAYED RELEASE ORAL at 08:45

## 2020-03-19 RX ADMIN — LACTULOSE 20 G: 10 SOLUTION ORAL at 08:45

## 2020-03-19 RX ADMIN — MORPHINE SULFATE 15 MG: 15 TABLET, FILM COATED, EXTENDED RELEASE ORAL at 08:45

## 2020-03-19 RX ADMIN — BUSPIRONE HYDROCHLORIDE 5 MG: 5 TABLET ORAL at 08:46

## 2020-03-19 RX ADMIN — PANTOPRAZOLE SODIUM 20 MG: 20 TABLET, DELAYED RELEASE ORAL at 05:30

## 2020-03-19 RX ADMIN — AMLODIPINE BESYLATE 5 MG: 5 TABLET ORAL at 08:45

## 2020-03-19 RX ADMIN — PROPRANOLOL HYDROCHLORIDE 20 MG: 20 TABLET ORAL at 08:46

## 2020-03-19 RX ADMIN — DICLOFENAC 4 G: 10 GEL TOPICAL at 08:45

## 2020-03-19 RX ADMIN — OXYBUTYNIN CHLORIDE 5 MG: 5 TABLET ORAL at 08:46

## 2020-03-19 RX ADMIN — HEPARIN SODIUM 7500 UNITS: 5000 INJECTION INTRAVENOUS; SUBCUTANEOUS at 05:30

## 2020-03-19 RX ADMIN — SENNOSIDES AND DOCUSATE SODIUM 1 TABLET: 8.6; 5 TABLET ORAL at 08:45

## 2020-03-19 RX ADMIN — LORATADINE 10 MG: 10 TABLET ORAL at 08:45

## 2020-03-19 RX ADMIN — METHYLPREDNISOLONE 12 MG: 4 TABLET ORAL at 08:47

## 2020-03-19 RX ADMIN — MELATONIN 1000 UNITS: at 08:45

## 2020-03-19 RX ADMIN — BISACODYL 10 MG: 5 TABLET, COATED ORAL at 08:45

## 2020-03-19 RX ADMIN — MELOXICAM 15 MG: 7.5 TABLET ORAL at 08:45

## 2020-03-19 RX ADMIN — OXYCODONE HYDROCHLORIDE 7.5 MG: 5 TABLET ORAL at 05:35

## 2020-03-19 NOTE — SOCIAL WORK
Natividad Scott called HUNTER back  Pt will be going to her daughters house  Daughter had a baby late last week  They do not want a Visiting nurse now  CLARENCE will  the patient  Hunter explained that staff can take patient downstairs in a wheelchair so family does not have to come in the building

## 2020-03-19 NOTE — PROGRESS NOTES
INTERNAL MEDICINE RESIDENCY PROGRESS NOTE     Name: Huber Cornelius   Age & Sex: 62 y o  female   MRN: [id number]  Unit/Bed#: [address] [facility] [address] 822-01   Encounter: [id number]  Team: SOD Team A    PATIENT INFORMATION     Name: Huber Cornelius   Age & Sex: 62 y o  female   MRN: [id number]  Hospital Stay Days: 9    ASSESSMENT/PLAN     Principal Problem:    Intractable low back pain  Active Problems:    Chronic pain disorder    Major depressive disorder, recurrent episode, moderate degree ([facility])    Bipolar II disorder ([facility])    Hypertension    Migraine    Tardive dyskinesia      * Intractable low back pain  Assessment & Plan  History of chronic low back pain and bilateral lower extremity pain  Patient came in today for worsening of her pain  Labs significant for CRP for 48 2, sed rate 16, alk phos 131  CT scan Lucency surrounding the superior pedicle screw on the right at T7 with paraspinal soft tissue thickening and endplate erosion   Findings suggest loosening and possible infection   Mild irregularity of endplates is seen at I9-7 and T7-8   The spinal canal is not well evaluated this level due to streak artifact versus any canal narrowing is not excluded  Lucency surrounds the left S1 pedicle screw which also suggests possible loosening and degenerative changes at L5-S1  Thoracic MRI limited, unable to rule out infectious etiology due to poor study    Lumbar MRI, limited however no gross pathologic processes  Plan:  · Pain control:  Oxycodone 7 5 mg Q 6 p r n  for moderate pain and severe pain, MS Contin 15 mg bid   · Neurosurgical outpatient follow-up for possible spinal stimulator   · Heating pad prn   · Lidocain patch at T12 paraspinal   · Medrol dose pack    Major depressive disorder, recurrent episode, moderate degree ([facility])  Assessment & Plan  Will continue Cymbalta 90 mg total for history of depression    Will hold on to risperidone and quetiapine since patient has severe tardive dyskinesia on physical exam     Chronic pain disorder  Assessment & Plan  Patient with long history of chronic back pain  Follows up with Neurosurgery outpatient  Patient recently evaluated by neurosurgery for possible spinal stimulator  Admits to trying Lyrica, ibuprofen, Tylenol, Cymbalta and was recently prescribed morphine 15 mg b i d  (60 tablets total) by her PCP  Patient admits that none of the pain medications have helped her with her pain so far  Mild feverresolved as of 3/10/2020  Assessment & Plan  With fever of 102 8  Repeat temperature 100 6°  Down trending blood pressure but no episodes of hypotension  Patient denied any URI symptoms or urinary symptoms  Unclear source at this time  Blood  Cultures negative at 24 hours  Plan:  · Continue to monitor fever and WBC daily    Tardive dyskinesia  Assessment & Plan  Patient currently on multiple psychiatric medications  Medication reconciliation is limited because patient does not know which medications she is taking  Patient was found to have tardive dyskinesia on physical exam   Will hold Seroquel, Klonopin and Lamictal   Call her pharmacy before initiating any medications  Migraine  Assessment & Plan  Admits to having her typical migraine headaches on presentation  Will continue home dose of Propanolol 20 mg b i d  Will give IV magnesium and Toradol 15 mg b i d  Hypertension  Assessment & Plan  Will continue amlodipine 10 mg daily  Bipolar II disorder [facility])  Assessment & Plan  Call pharmacy before initiating any psychiatric medication  Patient needs close outpatient psychiatric follow-up  Will hold on to Klonopin, Lamictal, Seroquel, risperidone  Disposition: Pending placement, medically stable      SUBJECTIVE     Patient seen and examined  No acute events overnight  Patient endorses some continued pain but much improved from previous  Admits to some shortness of breath but only during times of anxiety or severe pain   Denies chest pain, abdominal pain  She is eating and sleeping ok  OBJECTIVE     Vitals:    20 1537 20 1743 20 2106 20 2332   BP: 130/78 147/90 147/93 134/86   Pulse: 67      Resp: 20   16   Temp: 99 3 °F (37 4 °C)   97 6 °F (36 4 °C)   TempSrc:       SpO2: 96%      Weight:       Height:          Temperature:   Temp (24hrs), Av 3 °F (36 8 °C), Min:97 6 °F (36 4 °C), Max:99 3 °F (37 4 °C)    Temperature: 97 6 °F (36 4 °C)  Intake & Output:  I/O        0701 -  0700  07 -  0700  07 -  0700    P  O  1552 896     Total Intake(mL/kg) 1552 (16 5) 896 (9 6)     Urine (mL/kg/hr) 3225 (1 4) 0 (0)     Stool  0     Total Output 3225 0     Net -1673 +896            Unmeasured Urine Occurrence  9 x         Weights:   IBW: 45 5 kg    Body mass index is 40 39 kg/m²  Weight (last 2 days)     None        Physical Exam   Constitutional: She is oriented to person, place, and time  She appears well-developed and well-nourished  HENT:   Head: Normocephalic  Mouth/Throat: Oropharynx is clear and moist    Eyes: Conjunctivae are normal  No scleral icterus  Neck: No JVD present  Cardiovascular: Normal rate, regular rhythm and normal heart sounds  Pulmonary/Chest: Effort normal and breath sounds normal    Abdominal: Soft  Bowel sounds are normal  She exhibits distension  There is no tenderness  Musculoskeletal: She exhibits no edema or tenderness  Neurological: She is alert and oriented to person, place, and time  No cranial nerve deficit or sensory deficit  She exhibits normal muscle tone  Skin: Skin is warm and dry  Nursing note and vitals reviewed  LABORATORY DATA     Labs: I have personally reviewed pertinent reports    Results from last 7 days   Lab Units 20  0457   WBC Thousand/uL 6 38   HEMOGLOBIN g/dL 13 7   HEMATOCRIT % 40 7   PLATELETS Thousands/uL 323      Results from last 7 days   Lab Units 20  0457   POTASSIUM mmol/L 3 7   CHLORIDE mmol/L 110*   CO2 mmol/L 29   BUN mg/dL 14   CREATININE mg/dL 0 67   CALCIUM mg/dL 9 0                            IMAGING & DIAGNOSTIC TESTING     Radiology Results: I have personally reviewed pertinent reports  Ct Spine Thoracic Wo Contrast    Addendum Date: 3/8/2020    ADDENDUM: There is a typo in the conclusion  The 4th sentence should read "the spinal canal is not well evaluated at this level (T7) due to streak artifact; consequently, soft tissue canal narrowing is not excluded "  Clinical correlation with any neurologic signs including lower extremity weakness may be useful  Result Date: 3/8/2020  Impression: Lucency surrounding the superior pedicle screw on the right at T7 with paraspinal soft tissue thickening and endplate erosion  Findings suggest loosening and possible infection  Mild irregularity of endplates is seen at D0-2 and T7-8  The spinal canal  is not well evaluated this level due to streak artifact versus any canal narrowing is not excluded  Some endplate irregularity is seen at L5-S1 with also sclerosis which is more likely degenerative although some inflammatory component is not excluded  Lucency surrounds the left S1 pedicle screw which also suggests possible loosening  The right screw appears to pass into the right S1 foramen  Streak artifact does limit this study making assessment of canal narrowing difficult elsewhere  Possible erosion involving the right facet of L3-L4  Targeted MRI of the thoracic spine still may be useful with contrast to assess for any new marrow edema as compared to January as well as as well as any new canal involvement but may be limited due to streak artifact  Nuclear medicine study may also be useful  If prior CTs of the spine postoperatively are available that would be very helpful and an addendum could be rendered   Results were discussed with Dr Ryan Clark in the emergency room Workstation performed: [id number]     Ct Spine Lumbar Wo Contrast    Addendum Date: 3/8/2020 ADDENDUM: There is a typo in the conclusion  The 4th sentence should read "the spinal canal is not well evaluated at this level (T7) due to streak artifact; consequently, soft tissue canal narrowing is not excluded "  Clinical correlation with any neurologic signs including lower extremity weakness may be useful  Result Date: 3/8/2020  Impression: Lucency surrounding the superior pedicle screw on the right at T7 with paraspinal soft tissue thickening and endplate erosion  Findings suggest loosening and possible infection  Mild irregularity of endplates is seen at Q8-8 and T7-8  The spinal canal  is not well evaluated this level due to streak artifact versus any canal narrowing is not excluded  Some endplate irregularity is seen at L5-S1 with also sclerosis which is more likely degenerative although some inflammatory component is not excluded  Lucency surrounds the left S1 pedicle screw which also suggests possible loosening  The right screw appears to pass into the right S1 foramen  Streak artifact does limit this study making assessment of canal narrowing difficult elsewhere  Possible erosion involving the right facet of L3-L4  Targeted MRI of the thoracic spine still may be useful with contrast to assess for any new marrow edema as compared to January as well as as well as any new canal involvement but may be limited due to streak artifact  Nuclear medicine study may also be useful  If prior CTs of the spine postoperatively are available that would be very helpful and an addendum could be rendered  Results were discussed with Dr Ezra Bansal in the emergency room Workstation performed: [id number]     Mri Thoracic Spine Wo Contrast    Result Date: 3/10/2020  Impression: Markedly limited exam of the thoracic spine owing to motion and ferromagnetic posterior fixation hardware  Patient is fused from  T7 to the  Solitary, right T7 screw is loose based on CT    Although discitis osteomyelitis at T6-T7 cannot be excluded with certainty, this is the transition point where one would expect significant degenerative changes at the margin between the relatively mobile spine and  the long fixed segment extending to the sacrum  Workstation performed: [id number]     Mri Lumbar Spine W Wo Contrast    Result Date: 3/12/2020  Impression: Markedly limited exam owing to extensive surgical hardware  Enhancement and edema which occur within the vertebral bodies and disc at the T6-T7 level are nonspecific  There is no paraspinal phlegmon  This is favored to be degenerative in nature  However, serologic exclusion suggested with ESR, CRP and WBC  There does not appear to be significant lumbar spine canal stenosis  Workstation performed: [id number]     Other Diagnostic Testing: I have personally reviewed pertinent reports      ACTIVE MEDICATIONS     Current Facility-Administered Medications   Medication Dose Route Frequency    amLODIPine (NORVASC) tablet 5 mg  5 mg Oral Daily    bisacodyl (DULCOLAX) EC tablet 10 mg  10 mg Oral Daily    busPIRone (BUSPAR) tablet 5 mg  5 mg Oral TID    cholecalciferol (VITAMIN D3) tablet 1,000 Units  1,000 Units Oral Daily    cyclobenzaprine (FLEXERIL) tablet 5 mg  5 mg Oral TID PRN    diclofenac sodium (VOLTAREN) 1 % topical gel 4 g  4 g Topical 4x Daily    DULoxetine (CYMBALTA) delayed release capsule 30 mg  30 mg Oral Daily    DULoxetine (CYMBALTA) delayed release capsule 60 mg  60 mg Oral HS    heparin (porcine) subcutaneous injection 7,500 Units  7,500 Units Subcutaneous Q8H [address]    hydrOXYzine HCL (ATARAX) tablet 50 mg  50 mg Oral HS    lactulose 20 g/30 mL oral solution 20 g  20 g Oral Daily    lidocaine (LIDODERM) 5 % patch 1 patch  1 patch Topical HS    loratadine (CLARITIN) tablet 10 mg  10 mg Oral Daily    LORazepam (ATIVAN) tablet 0 5 mg  0 5 mg Oral Q8H PRN    melatonin tablet 6 mg  6 mg Oral HS    meloxicam (MOBIC) tablet 15 mg  15 mg Oral Daily    methylprednisolone (MEDROL) tablet 12 mg  12 mg Oral Daily    Followed by   Alvaro Shaw ON 3/20/2020] methylprednisolone (MEDROL) tablet 8 mg  8 mg Oral Daily    Followed by   Alvaro Shaw ON 3/21/2020] methylprednisolone (MEDROL) tablet 4 mg  4 mg Oral Daily    morphine (MS CONTIN) ER tablet 15 mg  15 mg Oral Q12H [address]    ondansetron (ZOFRAN) injection 4 mg  4 mg Intravenous Q6H PRN    oxybutynin (DITROPAN) tablet 5 mg  5 mg Oral BID    oxyCODONE (ROXICODONE) IR tablet 7 5 mg  7 5 mg Oral Q6H PRN    pantoprazole (PROTONIX) EC tablet 20 mg  20 mg Oral Early Morning    phenol (CHLORASEPTIC) 1 4 % mucosal liquid 1 spray  1 spray Mouth/Throat Q2H PRN    prazosin (MINIPRESS) capsule 2 mg  2 mg Oral Daily    pregabalin (LYRICA) capsule 100 mg  100 mg Oral TID    propranolol (INDERAL) tablet 20 mg  20 mg Oral BID    QUEtiapine (SEROquel XR) 24 hr tablet 300 mg  300 mg Oral HS    senna-docusate sodium (SENOKOT S) 8 6-50 mg per tablet 1 tablet  1 tablet Oral Daily    traZODone (DESYREL) tablet 150 mg  150 mg Oral HS       VTE Pharmacologic Prophylaxis: Heparin  VTE Mechanical Prophylaxis: sequential compression device    Portions of the record may have been created with voice recognition software  Occasional wrong word or "sound a like" substitutions may have occurred due to the inherent limitations of voice recognition software    Read the chart carefully and recognize, using context, where substitutions have occurred   ==  Kaitlynn Le MD  [facility]  Internal Medicine Residency PGY-1

## 2020-03-19 NOTE — SOCIAL WORK
Resident with [facility] came to Cm stating patient is now being recommended home with therapy or out patient therapy  Cm met with patient gave her an out patient therapy list  And a [address] [facility] and a [facility]  Pt very angry  She is making all kinds of excuses as to why she can't go home  She stated there are 3 adults and 3 children living in the same apartment  And there's no room for physical therapy  Cm called patients ros Wiggins [phone] explained patients progress with therapy and that she is being d/c home today  Elisha Lamar verbalized her has additional family staying at his apartment right now and they will be moving out next month  IMM given and verbally explained to the patient  Cm requested SOD-A Resident/ Attending call patient's family too

## 2020-03-20 ENCOUNTER — TRANSITIONAL CARE MANAGEMENT (OUTPATIENT)
Dept: INTERNAL MEDICINE CLINIC | Facility: CLINIC | Age: 55
End: 2020-03-20

## 2020-03-20 ENCOUNTER — PATIENT OUTREACH (OUTPATIENT)
Dept: INTERNAL MEDICINE CLINIC | Facility: CLINIC | Age: 55
End: 2020-03-20

## 2020-03-20 NOTE — PROGRESS NOTES
Outpatient Care Management Note:    Patient was inpatient at [facility] from 3/8 - 3/19/2020 for intractable low back pain  Patient being considered for inpatient rehab but was discharged home and declined VNA services  I called and spoke with patient  She reports she is still having pain but with the addition of oxycodone she says it is helping with her pain  Patient reports she was only given a 7 day supply and wants to know if this will be continued  Currently PCP office is prescribing pain medication to patient  Patient was living at [facility] for [address] and reports she is no longer living there aware reports it was too difficult to get around and ambulate to where she needed to get to  Patient reports currently she is living in a hotel on [email or web address] in [place] with her daughter, daughters , 2 young children and a   She reports she has her walker that she uses to ambulate  Patient reports she has all her medication at this time  Patient reports though she will need more pain medication soon and would like to follow up with her PCP office  I did make patient aware with COVID-19 currently office is not seeing patients in the office but I will further see of a telemedicine call can be done by a physician next week  Patient agreeable to this  Note it is difficult at times to understand patient on the phone as her speech is mumbled and her thoughts are disorganized at times  Patient reports she will be using [facility] [address] [place] [address] for her medication  Patient reports she does have scripts for her mental health medication and will have family take it to the pharmacy for her to get filled  I encouraged patient to call and follow up with mental health provider with [facility]  I reviewed with patient her appointment with weight management next week  She is planning on pushing this appointment out for now   Patient is aware of pain management appointment on 3/25 @ 11:30 am  Patient has [employer] for [employer]  Patient requesting phone number for Neurosurgery office since she had her MRI done while inpatient  I provided her with this number [phone] along with my contact number [phone] and PCP office number [phone]  Patient does have her after visit summary and I did review with her medications she should have stopped and any changes  Patient does not have any further questions, concerns, or other needs at this time  Pt has my contact # and PCP office # if needed  Pt is agreeable for further outreach

## 2020-03-20 NOTE — UTILIZATION REVIEW
Notification of Discharge  This is a Notification of Discharge from our facility [address]  Please be advised that this patient has been discharge from our facility  Below you will find the admission and discharge date and time including the patients disposition  PRESENTATION DATE: 3/8/2020  6:38 PM  OBS ADMISSION DATE:   IP ADMISSION DATE: 3/10/20 0728   DISCHARGE DATE: 3/19/2020  9:48 AM  DISPOSITION: Home/Self Care Home/Self Care   Admission Orders listed below:  Admission Orders (From admission, onward)     Ordered        03/10/20 0728  Inpatient Admission  Once         03/08/20 2226  Place in Observation (expected length of stay for this patient is less than two midnights)  Once                   Please contact the UR Department if additional information is required to close this patient's authorization/case  [address] Utilization Review Department  Main: [phone] x carefully listen to the prompts  All voicemails are confidential   [email or web address]  Send all requests for admission clinical reviews, approved or denied determinations and any other requests to dedicated fax number below belonging to the campus where the patient is receiving treatment   List of dedicated fax numbers:  [address] DENIALS (Administrative/Medical Necessity) [phone]   [address]  (Maternity/NICU/Pediatrics) [phone] [address] [facility] [phone]   Mark Montero [phone]   Jeremy Saucedo [phone]   [address] [phone]   [facility]  [phone]   [address] [facility], S   [address] [facility] [address] [facility] [phone]

## 2020-03-21 NOTE — DISCHARGE SUMMARY
INTERNAL MEDICINE RESIDENCY DISCHARGE SUMMARY     Samantha Guold   62 y o  female  MRN: [id number]  Room/Bed: [facility] 8 OVR/PPHP   [address]    Encounter: [id number]    Principal Problem:    Intractable low back pain  Active Problems:    Chronic pain disorder    Major depressive disorder, recurrent episode, moderate degree ([facility])    Bipolar II disorder ([facility])    Hypertension    Migraine    Tardive dyskinesia      * Intractable low back pain  Assessment & Plan  History of chronic low back pain and bilateral lower extremity pain  Patient came in today for worsening of her pain  Labs significant for CRP for 48 2, sed rate 16, alk phos 131  CT scan Lucency surrounding the superior pedicle screw on the right at T7 with paraspinal soft tissue thickening and endplate erosion   Findings suggest loosening and possible infection   Mild irregularity of endplates is seen at D7-4 and T7-8   The spinal canal is not well evaluated this level due to streak artifact versus any canal narrowing is not excluded  Lucency surrounds the left S1 pedicle screw which also suggests possible loosening and degenerative changes at L5-S1  Thoracic MRI limited, unable to rule out infectious etiology due to poor study    Lumbar MRI, limited however no gross pathologic processes  Plan:  · Pain control:  Oxycodone 7 5 mg Q 6 p r n  for moderate pain and severe pain, MS Contin 15 mg bid   · Neurosurgical outpatient follow-up for possible spinal stimulator   · Heating pad prn   · Lidocain patch at T12 paraspinal   · Medrol dose pack    Major depressive disorder, recurrent episode, moderate degree ([facility])  Assessment & Plan  Will continue Cymbalta 90 mg total for history of depression  Will hold on to risperidone and quetiapine since patient has severe tardive dyskinesia on physical exam     Chronic pain disorder  Assessment & Plan  Patient with long history of chronic back pain    Follows up with Neurosurgery outpatient  Patient recently evaluated by neurosurgery for possible spinal stimulator  Admits to trying Lyrica, ibuprofen, Tylenol, Cymbalta and was recently prescribed morphine 15 mg b i d  (60 tablets total) by her PCP  Patient admits that none of the pain medications have helped her with her pain so far  Improved with medrol dose pack  Mild feverresolved as of 3/10/2020  Assessment & Plan  With fever of 102 8  Repeat temperature 100 6°  Down trending blood pressure but no episodes of hypotension  Patient denied any URI symptoms or urinary symptoms  Unclear source at this time  Blood  Cultures negative at 24 hours  Plan:  · Continue to monitor fever and WBC daily    Tardive dyskinesia  Assessment & Plan  Patient currently on multiple psychiatric medications  Medication reconciliation is limited because patient does not know which medications she is taking  Patient was found to have tardive dyskinesia on physical exam   Will hold Seroquel, Klonopin and Lamictal   Call her pharmacy before initiating any medications  Migraine  Assessment & Plan  Admits to having her typical migraine headaches on presentation  Will continue home dose of Propanolol 20 mg b i d  Will give IV magnesium and Toradol 15 mg b i d  Hypertension  Assessment & Plan  Will continue amlodipine 10 mg daily  Bipolar II disorder [facility])  Assessment & Plan  Call pharmacy before initiating any psychiatric medication  Patient needs close outpatient psychiatric follow-up  Will hold on to Klonopin, Lamictal, Seroquel, risperidone        [address] COURSE     78-year-old female with extensive past medical history of chronic low back pain, GERD, anxiety, bilateral knee arthritis, bipolar disorder, depression, fibromyalgia, hypertension, migraine, stroke, overactive bladder, panic disorder, scoliosis status post back surgery who initially presented with worsening of her chronic low back pain and bilateral knee pain      Patient has history of chronic low back pain and bilateral knee pain  Admits that her pain is chronic and is the present for past many years  Patient admits that her pain has been progressively getting worse for past few days which brought her to come to ER      On presentation to ER, vitals were T 98 4°, HR 74, RR 22, /98, oxygen 100% room air  Labs showed potassium 3 1, alk-phos 131, T bili 1 49, normal WBC, sed rate 16, CRP 48 2  CT spine showed some degenerative changes at L5-S1, Lucency surrounding the superior pedicle screw on the right at T7 with paraspinal soft tissue thickening and endplate erosion   Findings suggest loosening and possible infection   Mild irregularity of endplates is seen at S2-1 and T7-8   The spinal canal is not well evaluated this level due to streak artifact versus any canal narrowing is not excluded  Patient received Dilaudid 0 5 mg IV, Zofran 4 mg IV and was admitted for further workup including spinal MRI      During my initial evaluation, patient was talking on the phone as I entered her room  Patient admits that her pain is chronic and she has tried multiple medications including Lyrica, ibuprofen, Tylenol, Cymbalta and was recently prescribed morphine 15 mg b i d  (60 tablets total)  Patient admits that none of the pain medications have helped her with her pain so far  Patient was constantly moving in her bed during my interview  Patient admitted to having history of scoliosis for which she had surgery in the past   Patient denied any chest pain, shortness of breath, abdominal pain, urinary incontinence, bladder incontinence, hematochezia, melena  Denies any recent weakness or numbness in her legs but does admits to having bilateral leg pain which is chronic for her        Upon chart review, Patient was recently seen at her PCP last month on February 20 and was prescribed morphine 15 mg b i d (60 tablets total)    Patient was also seen by [facility] neurosurgery on February 24th and plan was made to be evaluated for spinal cord stimulator implantation  Hospital course was significant for obtaining MR imaging of thoracic and lumbar spine  The studies were poor in quality with no gross abnormality seen  She was started on medrol dose pack and pain medications were titrated down  Patient was medically cleared and while awaiting placement was undergoing physical therapy  Patient was initially indicated for inpatient rehab whoever at the time of discharge she was cleared for home PT  Patient was discharged with referral to neurosurgery for evaluation for spinal stimulator  DISCHARGE INFORMATION     PCP at Discharge: [address] [place] [address]    Admitting Provider: Esha Palafox MD  Admission Date: 3/8/2020    Discharge Provider: No att  providers found  Discharge Date: 3/19/2020    Discharge Disposition: Home/Self Care  Discharge Condition: stable  Discharge with Lines: no    Discharge Diet: regular diet  Activity Restrictions: none  Test Results Pending at Discharge: none    Discharge Diagnoses:  Principal Problem:    Intractable low back pain  Active Problems:    Chronic pain disorder    Major depressive disorder, recurrent episode, moderate degree ([address] )    Bipolar II disorder ([address] )    Hypertension    Migraine    Tardive dyskinesia  Resolved Problems:    Mild fever      Consulting Providers:      Diagnostic & Therapeutic Procedures Performed:  Ct Spine Thoracic Wo Contrast    Addendum Date: 3/8/2020    ADDENDUM: There is a typo in the conclusion  The 4th sentence should read "the spinal canal is not well evaluated at this level (T7) due to streak artifact; consequently, soft tissue canal narrowing is not excluded "  Clinical correlation with any neurologic signs including lower extremity weakness may be useful      Result Date: 3/8/2020  Impression: Lucency surrounding the superior pedicle screw on the right at T7 with paraspinal soft tissue thickening and endplate erosion  Findings suggest loosening and possible infection  Mild irregularity of endplates is seen at I8-5 and T7-8  The spinal canal  is not well evaluated this level due to streak artifact versus any canal narrowing is not excluded  Some endplate irregularity is seen at L5-S1 with also sclerosis which is more likely degenerative although some inflammatory component is not excluded  Lucency surrounds the left S1 pedicle screw which also suggests possible loosening  The right screw appears to pass into the right S1 foramen  Streak artifact does limit this study making assessment of canal narrowing difficult elsewhere  Possible erosion involving the right facet of L3-L4  Targeted MRI of the thoracic spine still may be useful with contrast to assess for any new marrow edema as compared to January as well as as well as any new canal involvement but may be limited due to streak artifact  Nuclear medicine study may also be useful  If prior CTs of the spine postoperatively are available that would be very helpful and an addendum could be rendered  Results were discussed with Dr Scar Foote in the emergency room Workstation performed: [id number]     Ct Spine Lumbar Wo Contrast    Addendum Date: 3/8/2020    ADDENDUM: There is a typo in the conclusion  The 4th sentence should read "the spinal canal is not well evaluated at this level (T7) due to streak artifact; consequently, soft tissue canal narrowing is not excluded "  Clinical correlation with any neurologic signs including lower extremity weakness may be useful  Result Date: 3/8/2020  Impression: Lucency surrounding the superior pedicle screw on the right at T7 with paraspinal soft tissue thickening and endplate erosion  Findings suggest loosening and possible infection  Mild irregularity of endplates is seen at J8-3 and T7-8  The spinal canal  is not well evaluated this level due to streak artifact versus any canal narrowing is not excluded   Some endplate irregularity is seen at L5-S1 with also sclerosis which is more likely degenerative although some inflammatory component is not excluded  Lucency surrounds the left S1 pedicle screw which also suggests possible loosening  The right screw appears to pass into the right S1 foramen  Streak artifact does limit this study making assessment of canal narrowing difficult elsewhere  Possible erosion involving the right facet of L3-L4  Targeted MRI of the thoracic spine still may be useful with contrast to assess for any new marrow edema as compared to January as well as as well as any new canal involvement but may be limited due to streak artifact  Nuclear medicine study may also be useful  If prior CTs of the spine postoperatively are available that would be very helpful and an addendum could be rendered  Results were discussed with Dr Connor Mustafa in the emergency room Workstation performed: [id number]     Mri Thoracic Spine Wo Contrast    Result Date: 3/10/2020  Impression: Markedly limited exam of the thoracic spine owing to motion and ferromagnetic posterior fixation hardware  Patient is fused from  T7 to the  Solitary, right T7 screw is loose based on CT  Although discitis osteomyelitis at T6-T7 cannot be excluded with certainty, this is the transition point where one would expect significant degenerative changes at the margin between the relatively mobile spine and  the long fixed segment extending to the sacrum  Workstation performed: [id number]     Mri Lumbar Spine W Wo Contrast    Result Date: 3/12/2020  Impression: Markedly limited exam owing to extensive surgical hardware  Enhancement and edema which occur within the vertebral bodies and disc at the T6-T7 level are nonspecific  There is no paraspinal phlegmon  This is favored to be degenerative in nature  However, serologic exclusion suggested with ESR, CRP and WBC  There does not appear to be significant lumbar spine canal stenosis   Workstation performed: [id number]       Code Status: Prior  Advance Directive & Living Will: <no information>  Power of :    POLST:      Medications:  Discharge Medication List as of 3/19/2020  9:28 AM      STOP taking these medications       cetirizine (ZyrTEC) 10 mg tablet Comments:   Reason for Stopping:         Cholecalciferol (VITAMIN D3) 1000 units CAPS Comments:   Reason for Stopping:         ibuprofen (MOTRIN) 600 mg tablet Comments:   Reason for Stopping:         lactulose (CEPHULAC) 10 g packet Comments:   Reason for Stopping:         oxybutynin (DITROPAN-XL) 10 MG 24 hr tablet Comments:   Reason for Stopping:         polyethylene glycol (MIRALAX) 17 g Comments:   Reason for Stopping:         clonazePAM (KlonoPIN) 1 mg tablet Comments:   Reason for Stopping:         lamoTRIgine (LaMICtal) 100 mg tablet Comments:   Reason for Stopping:         lubiprostone (AMITIZA) 24 mcg capsule Comments:   Reason for Stopping:         potassium chloride (KLOR-CON 10) 10 mEq tablet Comments:   Reason for Stopping:         primidone (MYSOLINE) 250 mg tablet Comments:   Reason for Stopping:         primidone (MYSOLINE) 250 mg tablet Comments:   Reason for Stopping:         risperiDONE (RisperDAL) 2 mg tablet Comments:   Reason for Stopping:         SUMAtriptan (IMITREX) 50 mg tablet Comments:   Reason for Stopping:         traMADol (ULTRAM) 50 mg tablet Comments:   Reason for Stopping:         Valbenazine Tosylate 80 MG CAPS Comments:   Reason for Stopping:         venlafaxine 150 MG TB24 Comments:   Reason for Stopping:         venlafaxine 75 mg 24 hr tablet Comments:   Reason for Stopping:             Discharge Medication List as of 3/19/2020  9:28 AM      START taking these medications    Details   bisacodyl (DULCOLAX) 5 mg EC tablet Take 2 tablets (10 mg total) by mouth daily, Starting u 3/19/2020, Normal      cholecalciferol (VITAMIN D3) 1,000 units tablet Take 1 tablet (1,000 Units total) by mouth daily, Starting Thu 3/19/2020, Normal      cyclobenzaprine (FLEXERIL) 5 mg tablet Take 1 tablet (5 mg total) by mouth 3 (three) times a day as needed for muscle spasms, Starting Thu 3/19/2020, Until Sat 4/18/2020, Print      lactulose 20 g/30 mL Take 30 mL (20 g total) by mouth daily, Starting Thu 3/19/2020, Until Sat 4/18/2020, Normal      loratadine (CLARITIN) 10 mg tablet Take 1 tablet (10 mg total) by mouth daily, Starting Thu 3/19/2020, Normal      melatonin 3 mg Take 2 tablets (6 mg total) by mouth daily at bedtime, Starting Thu 3/19/2020, Until Sat 4/18/2020, Normal      !! methylprednisolone (MEDROL) 4 mg tablet Take 1 tablet (4 mg total) by mouth daily for 1 dose, Starting Sat 3/21/2020, Until Sun 3/22/2020, Print      !! methylPREDNISolone (MEDROL) 8 MG tablet Take 1 tablet (8 mg total) by mouth daily for 1 dose, Starting Fri 3/20/2020, Until Sat 3/21/2020, Print      lidocaine (LIDODERM) 5 % Apply 1 patch topically daily at bedtime Remove & Discard patch within 12 hours or as directed by MD, Starting Thu 3/19/2020, Until Sat 4/18/2020, Print      oxyCODONE (ROXICODONE) 15 mg immediate release tablet Take 0 5 tablets (7 5 mg total) by mouth every 6 (six) hours as needed for moderate pain or severe pain for up to 7 daysMax Daily Amount: 30 mg, Starting Thu 3/19/2020, Until Thu 3/26/2020, Normal       !! - Potential duplicate medications found  Please discuss with provider  Discharge Medication List as of 3/19/2020  9:28 AM      CONTINUE these medications which have NOT CHANGED    Details   busPIRone (BUSPAR) 5 mg tablet TAKE 1 TABLET (5 MG TOTAL) BY MOUTH 3 (THREE) TIMES A DAY, Starting Mon 10/7/2019, Normal      !! DULoxetine (CYMBALTA) 30 mg delayed release capsule Take 30 mg by mouth daily, Historical Med      !!  DULoxetine (CYMBALTA) 60 mg delayed release capsule Take 1 capsule (60 mg total) by mouth daily, Starting Thu 11/14/2019, Normal      LORazepam (ATIVAN) 0 5 mg tablet Take 0 5 mg by mouth every 8 (eight) hours as needed, Starting Mon 11/25/2019, Historical Med      omeprazole (PriLOSEC) 20 mg delayed release capsule Take 1 capsule (20 mg total) by mouth daily, Starting Fri 8/30/2019, Normal      pregabalin (LYRICA) 100 mg capsule Take 1 capsule (100 mg total) by mouth 3 (three) times a day, Starting Thu 1/30/2020, Normal      propranolol (INDERAL) 20 mg tablet Take 1 tablet (20 mg total) by mouth 2 (two) times a day, Starting Thu 8/29/2019, Normal      senna-docusate sodium (SENOKOT-S) 8 6-50 mg per tablet Take 1 tablet by mouth daily, Historical Med      acetaminophen (TYLENOL) 500 mg tablet Take 500 mg by mouth every 6 (six) hours as needed for mild pain, Historical Med      aspirin 81 MG tablet Take 1 tablet (81 mg total) by mouth daily, Starting Fri 8/30/2019, Normal      morphine (MS CONTIN) 15 mg 12 hr tablet Take 1 tablet (15 mg total) by mouth 2 (two) times a dayMax Daily Amount: 30 mg, Starting Tue 2/25/2020, Normal      QUEtiapine (SEROquel XR) 300 mg 24 hr tablet Take 1 tablet (300 mg total) by mouth daily at bedtime, Starting Fri 8/23/2019, Normal       !! - Potential duplicate medications found  Please discuss with provider  Allergies:  No Known Allergies    FOLLOW-UP     PCP Outpatient Follow-up:  yes      Follow up: PCP  Follow up within next: 2 weeks    Consulting Providers Follow-up:  yes      Physician name: Neurosurgery  Follow up within next: 4 weeks     Active Issues Requiring Follow-up:   yes     Issue: Chronic back pain   Responsible Individual: PCP and neurosurgery  What is Needed: evaluation for spinal stimulator and follow-up  Follow-up Appointments Arranged: No       Discharge Statement:   I spent 20 minutes minutes discharging the patient  This time was spent on the day of discharge  I had direct contact with the patient on the day of discharge  Additional documentation is required if more than 30 minutes were spent on discharge      Portions of the record may have been created with voice recognition software  Occasional wrong word or "sound a like" substitutions may have occurred due to the inherent limitations of voice recognition software    Read the chart carefully and recognize, using context, where substitutions have occurred     ==  Fady Ramos MD  [address]  Internal Medicine Resident PGY-1

## 2020-03-23 DIAGNOSIS — G47.00 INSOMNIA: ICD-10-CM

## 2020-03-23 DIAGNOSIS — F41.9 ANXIETY: ICD-10-CM

## 2020-03-23 DIAGNOSIS — F11.20 UNCOMPLICATED OPIOID DEPENDENCE (HCC): ICD-10-CM

## 2020-03-23 DIAGNOSIS — R25.1 TREMOR: ICD-10-CM

## 2020-03-23 DIAGNOSIS — M25.559 ARTHRALGIA OF HIP, UNSPECIFIED LATERALITY: ICD-10-CM

## 2020-03-23 RX ORDER — HYDROXYZINE 50 MG/1
50 TABLET, FILM COATED ORAL
Qty: 90 TABLET | Refills: 2 | Status: SHIPPED | OUTPATIENT
Start: 2020-03-23 | End: 2020-03-24 | Stop reason: SDUPTHER

## 2020-03-23 RX ORDER — LACTULOSE 20 G/30ML
20 SOLUTION ORAL DAILY
Qty: 900 ML | Refills: 0 | Status: SHIPPED | OUTPATIENT
Start: 2020-03-23 | End: 2020-03-24 | Stop reason: SDUPTHER

## 2020-03-23 RX ORDER — LANOLIN ALCOHOL/MO/W.PET/CERES
6 CREAM (GRAM) TOPICAL
Qty: 60 TABLET | Refills: 0 | Status: SHIPPED | OUTPATIENT
Start: 2020-03-23 | End: 2020-03-24 | Stop reason: SDUPTHER

## 2020-03-23 RX ORDER — PROPRANOLOL HYDROCHLORIDE 20 MG/1
20 TABLET ORAL 2 TIMES DAILY
Qty: 120 TABLET | Refills: 0 | Status: SHIPPED | OUTPATIENT
Start: 2020-03-23 | End: 2020-03-25 | Stop reason: SDUPTHER

## 2020-03-23 RX ORDER — MELOXICAM 15 MG/1
15 TABLET ORAL DAILY PRN
Qty: 30 TABLET | Refills: 0 | Status: SHIPPED | OUTPATIENT
Start: 2020-03-23 | End: 2020-03-24 | Stop reason: SDUPTHER

## 2020-03-23 NOTE — TELEPHONE ENCOUNTER
Name of medication, dose, quantity and frequency:    Requested Prescriptions     Pending Prescriptions Disp Refills    hydrOXYzine HCL (ATARAX) 50 mg tablet 30 tablet 0     Sig: Take 1 tablet (50 mg total) by mouth daily at bedtime    propranolol (INDERAL) 20 mg tablet 120 tablet 0     Sig: Take 1 tablet (20 mg total) by mouth 2 (two) times a day    meloxicam (MOBIC) 15 mg tablet 30 tablet 0     Sig: Take 1 tablet (15 mg total) by mouth daily    melatonin 3 mg 60 tablet 0     Sig: Take 2 tablets (6 mg total) by mouth daily at bedtime    lactulose 20 g/30 mL 900 mL 0     Sig: Take 30 mL (20 g total) by mouth daily       Number of refills left: 0    Amount of medication left:    Pharmacy verified and updated:  yes    Additional information:    Patient called in requesting refills  She is completely out  Please send electronically

## 2020-03-24 ENCOUNTER — TELEPHONE (OUTPATIENT)
Dept: INTERNAL MEDICINE CLINIC | Facility: CLINIC | Age: 55
End: 2020-03-24

## 2020-03-24 DIAGNOSIS — G47.00 INSOMNIA: ICD-10-CM

## 2020-03-24 DIAGNOSIS — M54.16 LUMBAR RADICULOPATHY: ICD-10-CM

## 2020-03-24 DIAGNOSIS — M25.559 ARTHRALGIA OF HIP, UNSPECIFIED LATERALITY: ICD-10-CM

## 2020-03-24 DIAGNOSIS — F41.9 ANXIETY: ICD-10-CM

## 2020-03-24 DIAGNOSIS — F11.20 UNCOMPLICATED OPIOID DEPENDENCE (HCC): ICD-10-CM

## 2020-03-24 RX ORDER — MELOXICAM 15 MG/1
15 TABLET ORAL DAILY PRN
Qty: 30 TABLET | Refills: 2 | Status: SHIPPED | OUTPATIENT
Start: 2020-03-24 | End: 2020-07-16

## 2020-03-24 RX ORDER — LACTULOSE 20 G/30ML
20 SOLUTION ORAL DAILY
Qty: 900 ML | Refills: 2 | Status: SHIPPED | OUTPATIENT
Start: 2020-03-24 | End: 2020-07-20

## 2020-03-24 RX ORDER — LANOLIN ALCOHOL/MO/W.PET/CERES
6 CREAM (GRAM) TOPICAL
Qty: 60 TABLET | Refills: 3 | Status: SHIPPED | OUTPATIENT
Start: 2020-03-24 | End: 2020-07-16

## 2020-03-24 RX ORDER — HYDROXYZINE 50 MG/1
50 TABLET, FILM COATED ORAL
Qty: 90 TABLET | Refills: 2 | Status: SHIPPED | OUTPATIENT
Start: 2020-03-24 | End: 2021-03-17 | Stop reason: SDUPTHER

## 2020-03-24 NOTE — TELEPHONE ENCOUNTER
DUE 3/26/2020  PDMP checked    This was given at discharge for a 7 day supply   Patient is already on Morphine, Flexeril and Lyrica

## 2020-03-24 NOTE — TELEPHONE ENCOUNTER
[facility] pharmacy called because [facility] [address] was unable to transfer patients medication refills over to them  They said they got rid of scripts because patient has been discharged from their nursing facility  They are asking that all the medication sent in yesterday be resent to [facility] pharmacy  Can this please be done? Please advise if I need to relink medication

## 2020-03-24 NOTE — TELEPHONE ENCOUNTER
Name of medication, dose, quantity and frequency    Requested Prescriptions     Pending Prescriptions Disp Refills    oxyCODONE (ROXICODONE) 15 mg immediate release tablet 14 tablet 0     Sig: Take 0 5 tablets (7 5 mg total) by mouth every 6 (six) hours as needed for moderate pain or severe pain for up to 7 daysMax Daily Amount: 30 mg         Number of refills left: 0    Amount of medication left: Has 3 days left    Pharmacy verified and updated-yes must be [facility]    Patient is no longer using [facility] pharmacy    Additional information: Jessica Caldera (wellness nurse) from [address] [place] [address] was on the call as well  They are trying to help patient switch everything over to [facility]  Patient would like to continue talking the Oxycodone that was ordered in the hospital  She states this is what helps her stand and walk

## 2020-03-25 ENCOUNTER — TELEPHONE (OUTPATIENT)
Dept: PAIN MEDICINE | Facility: CLINIC | Age: 55
End: 2020-03-25

## 2020-03-25 ENCOUNTER — TELEMEDICINE (OUTPATIENT)
Dept: PAIN MEDICINE | Facility: CLINIC | Age: 55
End: 2020-03-25
Payer: COMMERCIAL

## 2020-03-25 DIAGNOSIS — M79.7 FIBROMYALGIA: ICD-10-CM

## 2020-03-25 DIAGNOSIS — M48.062 SPINAL STENOSIS OF LUMBAR REGION WITH NEUROGENIC CLAUDICATION: ICD-10-CM

## 2020-03-25 DIAGNOSIS — G89.4 CHRONIC PAIN DISORDER: Primary | ICD-10-CM

## 2020-03-25 DIAGNOSIS — M54.16 LUMBAR RADICULOPATHY: ICD-10-CM

## 2020-03-25 DIAGNOSIS — I10 HYPERTENSION: ICD-10-CM

## 2020-03-25 DIAGNOSIS — M54.42 CHRONIC BILATERAL LOW BACK PAIN WITH BILATERAL SCIATICA: ICD-10-CM

## 2020-03-25 DIAGNOSIS — M96.1 POST LAMINECTOMY SYNDROME: ICD-10-CM

## 2020-03-25 DIAGNOSIS — K21.9 GASTROESOPHAGEAL REFLUX DISEASE WITHOUT ESOPHAGITIS: ICD-10-CM

## 2020-03-25 DIAGNOSIS — N39.41 URGE INCONTINENCE OF URINE: ICD-10-CM

## 2020-03-25 DIAGNOSIS — M54.41 CHRONIC BILATERAL LOW BACK PAIN WITH BILATERAL SCIATICA: ICD-10-CM

## 2020-03-25 DIAGNOSIS — I10 HYPERTENSION, UNSPECIFIED TYPE: ICD-10-CM

## 2020-03-25 DIAGNOSIS — G89.29 CHRONIC BILATERAL LOW BACK PAIN WITH BILATERAL SCIATICA: ICD-10-CM

## 2020-03-25 DIAGNOSIS — R25.1 TREMOR: ICD-10-CM

## 2020-03-25 DIAGNOSIS — T78.40XA ALLERGY: Primary | ICD-10-CM

## 2020-03-25 DIAGNOSIS — M51.16 LUMBAR DISC DISEASE WITH RADICULOPATHY: ICD-10-CM

## 2020-03-25 PROCEDURE — G2012 BRIEF CHECK IN BY MD/QHP: HCPCS | Performed by: NURSE PRACTITIONER

## 2020-03-25 RX ORDER — AMLODIPINE BESYLATE 5 MG/1
5 TABLET ORAL DAILY
Qty: 90 TABLET | Refills: 3 | Status: SHIPPED | OUTPATIENT
Start: 2020-03-25 | End: 2021-06-04 | Stop reason: SDUPTHER

## 2020-03-25 RX ORDER — PROPRANOLOL HYDROCHLORIDE 20 MG/1
20 TABLET ORAL 2 TIMES DAILY
Qty: 180 TABLET | Refills: 3 | Status: SHIPPED | OUTPATIENT
Start: 2020-03-25 | End: 2021-03-16 | Stop reason: SDUPTHER

## 2020-03-25 RX ORDER — OXYBUTYNIN CHLORIDE 5 MG/1
5 TABLET ORAL 2 TIMES DAILY
Qty: 180 TABLET | Refills: 3 | Status: SHIPPED | OUTPATIENT
Start: 2020-03-25 | End: 2021-03-16 | Stop reason: SDUPTHER

## 2020-03-25 RX ORDER — PREGABALIN 100 MG/1
100 CAPSULE ORAL 3 TIMES DAILY
Qty: 90 CAPSULE | Refills: 2 | Status: SHIPPED | OUTPATIENT
Start: 2020-03-25 | End: 2020-06-11 | Stop reason: SDUPTHER

## 2020-03-25 RX ORDER — OMEPRAZOLE 20 MG/1
20 CAPSULE, DELAYED RELEASE ORAL DAILY
Qty: 90 CAPSULE | Refills: 3 | Status: SHIPPED | OUTPATIENT
Start: 2020-03-25 | End: 2021-03-16 | Stop reason: SDUPTHER

## 2020-03-25 NOTE — PATIENT INSTRUCTIONS
Plan:  1  The patient may continue Lyrica 100 mg t i d  For neuropathic complaints  This medication was refilled at today's office visit  2  I encouraged the patient to call and schedule follow-up office visit with Neurosurgery as recommended  3  The patient may continue duloxetine, cyclobenzaprine, meloxicam and oxycodone as per PCP  4  The patient will follow-up in 12 weeks for medication prescription refill and reevaluation  The patient was advised to contact the office should their symptoms worsen in the interim  The patient was agreeable and verbalized an understanding

## 2020-03-25 NOTE — TELEPHONE ENCOUNTER
Name of medication, dose, quantity and frequency:    Requested Prescriptions     Pending Prescriptions Disp Refills    amLODIPine (NORVASC) 5 mg tablet 30 tablet 1     Sig: Take 1 tablet (5 mg total) by mouth daily    propranolol (INDERAL) 20 mg tablet 120 tablet 0     Sig: Take 1 tablet (20 mg total) by mouth 2 (two) times a day    omeprazole (PriLOSEC) 20 mg delayed release capsule 60 capsule 0     Sig: Take 1 capsule (20 mg total) by mouth daily    aspirin 81 MG tablet 30 tablet 3     Sig: Take 1 tablet (81 mg total) by mouth daily    oxybutynin (DITROPAN) 5 mg tablet 60 tablet 1     Sig: Take 1 tablet (5 mg total) by mouth 2 (two) times a day       Number of refills left:    Amount of medication left:    Pharmacy verified and updated:  yes    Additional information:    Patient called into clinic along with an aide requesting that these medications be sent over to [facility]  [facility]  Patient is also in need of ZYRTEC and PRIMIDONE 250 MG TABLETS TAKING 1 TABLET AT BEDTIME    (These two medications were not in her medication list and as per patient they were last prescribed here at clinic)

## 2020-03-25 NOTE — PROGRESS NOTES
Virtual Regular Visit    Problem List Items Addressed This Visit        Nervous and Auditory    Chronic bilateral low back pain with bilateral sciatica    Lumbar radiculopathy    Relevant Medications    pregabalin (LYRICA) 100 mg capsule    Lumbar disc disease with radiculopathy       Other    Chronic pain disorder - Primary    Fibromyalgia    Spinal stenosis of lumbar region with neurogenic claudication    Post laminectomy syndrome               Reason for visit is for follow-up and medication refill    Encounter provider MERCY Cunningham    Provider located at [address] [place] [address]      Recent Visits  Date Type Provider Dept   03/24/20 Telephone Ryan Suarez MD [facility] recent visits within past 7 days and meeting all other requirements     Today's Visits  Date Type Provider Dept   03/25/20 Telephone Iraerika Schneider [facility] today's visits and meeting all other requirements     Future Appointments  Date Type Provider Dept   03/25/20 Telephone Ira Guard, [facility]   Showing future appointments within next 150 days and meeting all other requirements        After connecting through telephone, the patient was identified by name and date of birth  Lauren Carcamo was informed that this is a telemedicine visit and that the visit is being conducted through telephone  She acknowledged consent and understanding of privacy and security of the telephone platform  The patient has agreed to participate and understands they can discontinue the visit at any time      Subjective  Samantha QUINONEZ Katie Galdamez is a 62 y o  female with a history of T7-S1 fusion for scoliosis correction as well as subsequent C4-5 ACDF last seen in the office on January 30, 2020 who is currently concerned about the risks of COVID-19, and although not experiencing signs or symptoms of COVID-19, out of fear of exposure, the patient preferred to proceed with virtual visit today  The patient is being seen by our practice for chronic low back pain secondary to lumbar post-laminectomy syndrome, fibromyalgia, and chronic pain syndrome  The patient denies bowel or bladder incontinence or saddle anesthesia  The patient does report that she was recently hospitalized with intractable back pain  A work up was completed with CT and MRI of both thoracic and lumbar spine while in the hospital  There was some question of possible diskitis/osteomyelitis, however other than elevated CRP, CBC and sed rate were WNL  She was instructed to follow up with neurosurgery, however she does not currently have an appointment scheduled  She was seeing them originally regarding a possible SCS trial   She is currently taking Lyrica 100 mg t i d  With mild relief  She is also on oxycodone 7 5 mg every 6 hours as needed for severe pain which was prescribed when the patient was discharged from the hospital and duloxetine as prescribed by psych  She currently rates her pain an 8/10  Past Medical History:   Diagnosis Date    Acid reflux     Anxiety     RESOLVED: [id number]    Arthritis     Bipolar 2 disorder (HCC)     FOLLOWS WITH PSYCHIATRIST  CONTINUE LAMOTRIGINE; RESOLVED: [id number]    Depression     Familial tremor     both hands    Fibromyalgia     LAST ASSESSED: [id number]    Hearing aid worn     left ear    [place] (hard of hearing)     left ear    Hypertension     Left-sided weakness     Lower back pain     Memory loss of unknown cause     long and short term    Migraine     Overactive bladder     Panic attack     Post traumatic stress disorder     Seasonal allergies     Stroke [facility])     questionable stroke 2009    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    CONT ASA 81 MG DAILY; RESOLVED: [id number]    Urinary incontinence     Wears dentures     partial lower / full upper    Wears glasses        Past Surgical History:   Procedure Laterality Date    BACK SURGERY       SECTION      COLONOSCOPY      RESOLVED: [id number]    EAR SURGERY      HYSTERECTOMY      MYRINGOTOMY W/ TUBES Left     NECK SURGERY  2019    MD CYSTOURETHROSCOPY N/A 2016    Procedure: CYSTOSCOPY, BOTOX INJECTION;  Surgeon: Tony Triplett MD;  Location: [facility];  Service: Gynecology    TONSILLECTOMY     [address] [place]       Current Outpatient Medications   Medication Sig Dispense Refill    acetaminophen (TYLENOL) 500 mg tablet Take 500 mg by mouth every 6 (six) hours as needed for mild pain      amLODIPine (NORVASC) 5 mg tablet Take 1 tablet (5 mg total) by mouth daily 30 tablet 1    aspirin 81 MG tablet Take 1 tablet (81 mg total) by mouth daily (Patient not taking: Reported on 3/8/2020) 30 tablet 3    bisacodyl (DULCOLAX) 5 mg EC tablet Take 2 tablets (10 mg total) by mouth daily 30 tablet 0    busPIRone (BUSPAR) 5 mg tablet TAKE 1 TABLET (5 MG TOTAL) BY MOUTH 3 (THREE) TIMES A DAY 90 tablet 0    cholecalciferol (VITAMIN D3) 1,000 units tablet Take 1 tablet (1,000 Units total) by mouth daily 30 tablet 1    cyclobenzaprine (FLEXERIL) 5 mg tablet Take 1 tablet (5 mg total) by mouth 3 (three) times a day as needed for muscle spasms 30 tablet 1    diclofenac sodium (VOLTAREN) 1 % Apply 4 g topically 4 (four) times a day 30 Tube 0    DULoxetine (CYMBALTA) 30 mg delayed release capsule Take 30 mg by mouth daily      DULoxetine (CYMBALTA) 60 mg delayed release capsule Take 1 capsule (60 mg total) by mouth daily 90 capsule 0    hydrOXYzine HCL (ATARAX) 50 mg tablet Take 1 tablet (50 mg total) by mouth daily at bedtime as needed for anxiety (insomnia) 90 tablet 2    lactulose 20 g/30 mL Take 30 mL (20 g total) by mouth daily 900 mL 2    lidocaine (LIDODERM) 5 % Apply 1 patch topically daily at bedtime Remove & Discard patch within 12 hours or as directed by MD 30 patch 0    loratadine (CLARITIN) 10 mg tablet Take 1 tablet (10 mg total) by mouth daily 30 tablet 1    LORazepam (ATIVAN) 0 5 mg tablet Take 0 5 mg by mouth every 8 (eight) hours as needed      melatonin 3 mg Take 2 tablets (6 mg total) by mouth daily at bedtime as needed (insomnia) 60 tablet 3    meloxicam (MOBIC) 15 mg tablet Take 1 tablet (15 mg total) by mouth daily as needed for moderate pain 30 tablet 2    morphine (MS CONTIN) 15 mg 12 hr tablet Take 1 tablet (15 mg total) by mouth 2 (two) times a dayMax Daily Amount: 30 mg 60 tablet 0    omeprazole (PriLOSEC) 20 mg delayed release capsule Take 1 capsule (20 mg total) by mouth daily 60 capsule 0    oxybutynin (DITROPAN) 5 mg tablet Take 1 tablet (5 mg total) by mouth 2 (two) times a day 60 tablet 1    oxyCODONE (ROXICODONE) 15 mg immediate release tablet Take 0 5 tablets (7 5 mg total) by mouth every 6 (six) hours as needed for moderate pain or severe pain for up to 7 daysMax Daily Amount: 30 mg 14 tablet 0    prazosin (MINIPRESS) 2 mg capsule Take 1 capsule (2 mg total) by mouth daily 30 capsule 1    pregabalin (LYRICA) 100 mg capsule Take 1 capsule (100 mg total) by mouth 3 (three) times a day 90 capsule 2    propranolol (INDERAL) 20 mg tablet Take 1 tablet (20 mg total) by mouth 2 (two) times a day 120 tablet 0    QUEtiapine (SEROquel XR) 300 mg 24 hr tablet Take 1 tablet (300 mg total) by mouth daily at bedtime 60 tablet 1    senna-docusate sodium (SENOKOT-S) 8 6-50 mg per tablet Take 1 tablet by mouth daily      traZODone (DESYREL) 150 mg tablet Take 1 tablet (150 mg total) by mouth daily at bedtime 30 tablet 1     No current facility-administered medications for this visit  No Known Allergies    Plan:  1  The patient may continue Lyrica 100 mg t i d  For neuropathic complaints  This medication was refilled at today's office visit  2  I encouraged the patient to call and schedule follow-up office visit with Neurosurgery as recommended    3  The patient may continue duloxetine, cyclobenzaprine, meloxicam and oxycodone as per PCP  4  The patient will follow-up in 12 weeks for medication prescription refill and reevaluation  The patient was advised to contact the office should their symptoms worsen in the interim  The patient was agreeable and verbalized an understanding  I spent 11 minutes with the patient during this visit

## 2020-03-25 NOTE — TELEPHONE ENCOUNTER
Called patient to conduct telemedicine office visit, patient did not answer  Left VM for patient to return call

## 2020-03-26 DIAGNOSIS — R25.1 TREMOR: Primary | ICD-10-CM

## 2020-03-26 RX ORDER — OXYBUTYNIN CHLORIDE 5 MG/1
5 TABLET ORAL 2 TIMES DAILY
Qty: 60 TABLET | Refills: 1 | Status: CANCELLED | OUTPATIENT
Start: 2020-03-26 | End: 2020-04-25

## 2020-03-26 RX ORDER — PROPRANOLOL HYDROCHLORIDE 20 MG/1
20 TABLET ORAL 2 TIMES DAILY
Qty: 120 TABLET | Refills: 0 | Status: CANCELLED | OUTPATIENT
Start: 2020-03-26

## 2020-03-26 RX ORDER — OMEPRAZOLE 20 MG/1
20 CAPSULE, DELAYED RELEASE ORAL DAILY
Qty: 60 CAPSULE | Refills: 0 | Status: CANCELLED | OUTPATIENT
Start: 2020-03-26

## 2020-03-26 RX ORDER — PRIMIDONE 250 MG/1
250 TABLET ORAL
Qty: 90 TABLET | Refills: 3 | Status: SHIPPED | OUTPATIENT
Start: 2020-03-26 | End: 2020-06-24

## 2020-03-26 RX ORDER — AMLODIPINE BESYLATE 5 MG/1
5 TABLET ORAL DAILY
Qty: 30 TABLET | Refills: 1 | Status: CANCELLED | OUTPATIENT
Start: 2020-03-26

## 2020-03-26 RX ORDER — CETIRIZINE HYDROCHLORIDE 10 MG/1
10 TABLET ORAL DAILY
Qty: 90 TABLET | Refills: 3 | Status: SHIPPED | OUTPATIENT
Start: 2020-03-26 | End: 2020-06-03 | Stop reason: ALTCHOICE

## 2020-03-26 NOTE — TELEPHONE ENCOUNTER
I discussed with Dr Del Carolina this was medication prescribed for acute back pain as was the medrol pack she got  This was temporary prescription not something that we will be continuing   She has the morphine ER that is being prescribed still but that is all we will be prescribing

## 2020-03-27 DIAGNOSIS — M25.559 ARTHRALGIA OF HIP, UNSPECIFIED LATERALITY: Primary | ICD-10-CM

## 2020-03-29 RX ORDER — OXYCODONE HYDROCHLORIDE 15 MG/1
7.5 TABLET ORAL EVERY 6 HOURS PRN
Qty: 14 TABLET | Refills: 0 | OUTPATIENT
Start: 2020-03-29 | End: 2020-04-05

## 2020-03-30 RX ORDER — ACETAMINOPHEN 500 MG
500 TABLET ORAL EVERY 6 HOURS PRN
Qty: 90 TABLET | Refills: 3 | Status: SHIPPED | OUTPATIENT
Start: 2020-03-30 | End: 2020-07-20

## 2020-04-08 DIAGNOSIS — M54.16 LUMBAR RADICULOPATHY: ICD-10-CM

## 2020-04-08 DIAGNOSIS — G89.4 CHRONIC PAIN DISORDER: ICD-10-CM

## 2020-04-08 RX ORDER — MORPHINE SULFATE 15 MG/1
15 TABLET, FILM COATED, EXTENDED RELEASE ORAL 2 TIMES DAILY
Qty: 60 TABLET | Refills: 0 | Status: SHIPPED | OUTPATIENT
Start: 2020-04-08 | End: 2020-05-19 | Stop reason: SDUPTHER

## 2020-04-09 RX ORDER — CYCLOBENZAPRINE HCL 5 MG
5 TABLET ORAL 3 TIMES DAILY PRN
Qty: 30 TABLET | Refills: 1 | Status: SHIPPED | OUTPATIENT
Start: 2020-04-09 | End: 2020-07-20

## 2020-04-10 DIAGNOSIS — I10 HYPERTENSION, UNSPECIFIED TYPE: ICD-10-CM

## 2020-04-10 RX ORDER — PRAZOSIN HYDROCHLORIDE 2 MG/1
CAPSULE ORAL
Qty: 30 CAPSULE | Refills: 1 | Status: SHIPPED | OUTPATIENT
Start: 2020-04-10 | End: 2020-06-16

## 2020-04-16 ENCOUNTER — TELEPHONE (OUTPATIENT)
Dept: OTHER | Facility: OTHER | Age: 55
End: 2020-04-16

## 2020-04-20 ENCOUNTER — TELEPHONE (OUTPATIENT)
Dept: OBGYN CLINIC | Facility: CLINIC | Age: 55
End: 2020-04-20

## 2020-04-24 ENCOUNTER — PATIENT OUTREACH (OUTPATIENT)
Dept: INTERNAL MEDICINE CLINIC | Facility: CLINIC | Age: 55
End: 2020-04-24

## 2020-05-01 ENCOUNTER — OFFICE VISIT (OUTPATIENT)
Dept: OBGYN CLINIC | Facility: CLINIC | Age: 55
End: 2020-05-01
Payer: COMMERCIAL

## 2020-05-01 DIAGNOSIS — M22.2X1 PATELLOFEMORAL DISORDER OF BOTH KNEES: Primary | ICD-10-CM

## 2020-05-01 DIAGNOSIS — M17.0 PRIMARY OSTEOARTHRITIS OF BOTH KNEES: ICD-10-CM

## 2020-05-01 DIAGNOSIS — M22.2X2 PATELLOFEMORAL DISORDER OF BOTH KNEES: Primary | ICD-10-CM

## 2020-05-01 PROCEDURE — 99214 OFFICE O/P EST MOD 30 MIN: CPT | Performed by: PHYSICIAN ASSISTANT

## 2020-05-01 PROCEDURE — 1111F DSCHRG MED/CURRENT MED MERGE: CPT | Performed by: PHYSICIAN ASSISTANT

## 2020-05-01 PROCEDURE — 3079F DIAST BP 80-89 MM HG: CPT | Performed by: PHYSICIAN ASSISTANT

## 2020-05-01 PROCEDURE — 20610 DRAIN/INJ JOINT/BURSA W/O US: CPT | Performed by: PHYSICIAN ASSISTANT

## 2020-05-01 PROCEDURE — 3075F SYST BP GE 130 - 139MM HG: CPT | Performed by: PHYSICIAN ASSISTANT

## 2020-05-01 PROCEDURE — 1036F TOBACCO NON-USER: CPT | Performed by: PHYSICIAN ASSISTANT

## 2020-05-01 RX ORDER — LIDOCAINE HYDROCHLORIDE 10 MG/ML
1 INJECTION, SOLUTION INFILTRATION; PERINEURAL
Status: COMPLETED | OUTPATIENT
Start: 2020-05-01 | End: 2020-05-01

## 2020-05-01 RX ORDER — BUPIVACAINE HYDROCHLORIDE 5 MG/ML
2 INJECTION, SOLUTION EPIDURAL; INTRACAUDAL
Status: COMPLETED | OUTPATIENT
Start: 2020-05-01 | End: 2020-05-01

## 2020-05-01 RX ORDER — BETAMETHASONE SODIUM PHOSPHATE AND BETAMETHASONE ACETATE 3; 3 MG/ML; MG/ML
12 INJECTION, SUSPENSION INTRA-ARTICULAR; INTRALESIONAL; INTRAMUSCULAR; SOFT TISSUE
Status: COMPLETED | OUTPATIENT
Start: 2020-05-01 | End: 2020-05-01

## 2020-05-01 RX ADMIN — BUPIVACAINE HYDROCHLORIDE 2 ML: 5 INJECTION, SOLUTION EPIDURAL; INTRACAUDAL at 12:15

## 2020-05-01 RX ADMIN — BETAMETHASONE SODIUM PHOSPHATE AND BETAMETHASONE ACETATE 12 MG: 3; 3 INJECTION, SUSPENSION INTRA-ARTICULAR; INTRALESIONAL; INTRAMUSCULAR; SOFT TISSUE at 12:15

## 2020-05-01 RX ADMIN — BUPIVACAINE HYDROCHLORIDE 2 ML: 5 INJECTION, SOLUTION EPIDURAL; INTRACAUDAL at 12:16

## 2020-05-01 RX ADMIN — BETAMETHASONE SODIUM PHOSPHATE AND BETAMETHASONE ACETATE 12 MG: 3; 3 INJECTION, SUSPENSION INTRA-ARTICULAR; INTRALESIONAL; INTRAMUSCULAR; SOFT TISSUE at 12:16

## 2020-05-01 RX ADMIN — LIDOCAINE HYDROCHLORIDE 1 ML: 10 INJECTION, SOLUTION INFILTRATION; PERINEURAL at 12:16

## 2020-05-01 RX ADMIN — LIDOCAINE HYDROCHLORIDE 1 ML: 10 INJECTION, SOLUTION INFILTRATION; PERINEURAL at 12:15

## 2020-05-04 ENCOUNTER — PATIENT OUTREACH (OUTPATIENT)
Dept: INTERNAL MEDICINE CLINIC | Facility: CLINIC | Age: 55
End: 2020-05-04

## 2020-05-11 ENCOUNTER — PATIENT OUTREACH (OUTPATIENT)
Dept: INTERNAL MEDICINE CLINIC | Facility: CLINIC | Age: 55
End: 2020-05-11

## 2020-05-18 ENCOUNTER — PATIENT OUTREACH (OUTPATIENT)
Dept: INTERNAL MEDICINE CLINIC | Facility: CLINIC | Age: 55
End: 2020-05-18

## 2020-05-19 DIAGNOSIS — G89.4 CHRONIC PAIN DISORDER: ICD-10-CM

## 2020-05-19 DIAGNOSIS — M54.16 LUMBAR RADICULOPATHY: ICD-10-CM

## 2020-05-20 RX ORDER — MORPHINE SULFATE 15 MG/1
15 TABLET, FILM COATED, EXTENDED RELEASE ORAL 2 TIMES DAILY
Qty: 60 TABLET | Refills: 0 | Status: SHIPPED | OUTPATIENT
Start: 2020-05-20 | End: 2020-06-17 | Stop reason: SDUPTHER

## 2020-05-27 ENCOUNTER — TELEPHONE (OUTPATIENT)
Dept: OBGYN CLINIC | Facility: HOSPITAL | Age: 55
End: 2020-05-27

## 2020-05-27 DIAGNOSIS — M54.16 LUMBAR RADICULOPATHY: ICD-10-CM

## 2020-05-29 DIAGNOSIS — M54.16 LUMBAR RADICULOPATHY: ICD-10-CM

## 2020-06-02 ENCOUNTER — PATIENT OUTREACH (OUTPATIENT)
Dept: INTERNAL MEDICINE CLINIC | Facility: CLINIC | Age: 55
End: 2020-06-02

## 2020-06-03 ENCOUNTER — TELEMEDICINE (OUTPATIENT)
Dept: INTERNAL MEDICINE CLINIC | Facility: CLINIC | Age: 55
End: 2020-06-03

## 2020-06-03 ENCOUNTER — TELEPHONE (OUTPATIENT)
Dept: INTERNAL MEDICINE CLINIC | Facility: CLINIC | Age: 55
End: 2020-06-03

## 2020-06-03 DIAGNOSIS — M51.16 LUMBAR DISC DISEASE WITH RADICULOPATHY: ICD-10-CM

## 2020-06-03 DIAGNOSIS — L30.9 ECZEMA, UNSPECIFIED TYPE: ICD-10-CM

## 2020-06-03 DIAGNOSIS — G47.33 OSA (OBSTRUCTIVE SLEEP APNEA): ICD-10-CM

## 2020-06-03 DIAGNOSIS — M17.0 PRIMARY LOCALIZED OSTEOARTHRITIS OF BOTH KNEES: ICD-10-CM

## 2020-06-03 DIAGNOSIS — L30.9 ECZEMA, UNSPECIFIED TYPE: Primary | ICD-10-CM

## 2020-06-03 DIAGNOSIS — M54.41 CHRONIC BILATERAL LOW BACK PAIN WITH BILATERAL SCIATICA: ICD-10-CM

## 2020-06-03 DIAGNOSIS — B37.2 CANDIDAL INTERTRIGO: Primary | ICD-10-CM

## 2020-06-03 DIAGNOSIS — M54.16 LUMBAR RADICULOPATHY: Primary | ICD-10-CM

## 2020-06-03 DIAGNOSIS — M25.559 ARTHRALGIA OF HIP, UNSPECIFIED LATERALITY: ICD-10-CM

## 2020-06-03 DIAGNOSIS — M54.42 CHRONIC BILATERAL LOW BACK PAIN WITH BILATERAL SCIATICA: ICD-10-CM

## 2020-06-03 DIAGNOSIS — G89.29 CHRONIC BILATERAL LOW BACK PAIN WITH BILATERAL SCIATICA: ICD-10-CM

## 2020-06-03 PROBLEM — R21 RASH: Status: ACTIVE | Noted: 2020-06-03

## 2020-06-03 PROCEDURE — 99214 OFFICE O/P EST MOD 30 MIN: CPT | Performed by: INTERNAL MEDICINE

## 2020-06-03 RX ORDER — LORATADINE 10 MG/1
10 TABLET ORAL DAILY
Qty: 30 TABLET | Refills: 1 | Status: SHIPPED | OUTPATIENT
Start: 2020-06-03 | End: 2020-07-16

## 2020-06-03 RX ORDER — DIAPER,BRIEF,INFANT-TODD,DISP
EACH MISCELLANEOUS 4 TIMES DAILY PRN
Qty: 45 G | Refills: 1 | Status: SHIPPED | OUTPATIENT
Start: 2020-06-03 | End: 2020-07-20

## 2020-06-03 RX ORDER — NYSTATIN 100000 U/G
OINTMENT TOPICAL 2 TIMES DAILY
Qty: 30 G | Refills: 3 | Status: SHIPPED | OUTPATIENT
Start: 2020-06-03 | End: 2020-07-20

## 2020-06-03 RX ORDER — AMMONIUM LACTATE 12 G/100G
CREAM TOPICAL 2 TIMES DAILY
Qty: 140 G | Refills: 3 | Status: SHIPPED | OUTPATIENT
Start: 2020-06-03 | End: 2020-07-20

## 2020-06-11 ENCOUNTER — TELEMEDICINE (OUTPATIENT)
Dept: PAIN MEDICINE | Facility: CLINIC | Age: 55
End: 2020-06-11
Payer: COMMERCIAL

## 2020-06-11 DIAGNOSIS — M46.1 SACROILIITIS (HCC): ICD-10-CM

## 2020-06-11 DIAGNOSIS — G89.4 CHRONIC PAIN DISORDER: Primary | ICD-10-CM

## 2020-06-11 DIAGNOSIS — M96.1 POST LAMINECTOMY SYNDROME: ICD-10-CM

## 2020-06-11 DIAGNOSIS — M79.7 FIBROMYALGIA: ICD-10-CM

## 2020-06-11 DIAGNOSIS — M47.816 LUMBAR SPONDYLOSIS: ICD-10-CM

## 2020-06-11 DIAGNOSIS — M54.16 LUMBAR RADICULOPATHY: ICD-10-CM

## 2020-06-11 PROCEDURE — 99214 OFFICE O/P EST MOD 30 MIN: CPT | Performed by: ANESTHESIOLOGY

## 2020-06-11 RX ORDER — PREGABALIN 150 MG/1
150 CAPSULE ORAL 3 TIMES DAILY
Qty: 90 CAPSULE | Refills: 2 | Status: SHIPPED | OUTPATIENT
Start: 2020-06-11 | End: 2020-06-17 | Stop reason: SDUPTHER

## 2020-06-15 DIAGNOSIS — I10 HYPERTENSION, UNSPECIFIED TYPE: ICD-10-CM

## 2020-06-16 RX ORDER — PRAZOSIN HYDROCHLORIDE 2 MG/1
CAPSULE ORAL
Qty: 30 CAPSULE | Refills: 1 | Status: SHIPPED | OUTPATIENT
Start: 2020-06-16 | End: 2020-07-09

## 2020-06-17 ENCOUNTER — TELEPHONE (OUTPATIENT)
Dept: PAIN MEDICINE | Facility: CLINIC | Age: 55
End: 2020-06-17

## 2020-06-17 DIAGNOSIS — M54.16 LUMBAR RADICULOPATHY: ICD-10-CM

## 2020-06-17 DIAGNOSIS — G89.4 CHRONIC PAIN DISORDER: ICD-10-CM

## 2020-06-17 RX ORDER — PREGABALIN 150 MG/1
150 CAPSULE ORAL 3 TIMES DAILY
Qty: 90 CAPSULE | Refills: 2 | Status: SHIPPED | OUTPATIENT
Start: 2020-06-17 | End: 2021-05-17 | Stop reason: HOSPADM

## 2020-06-18 ENCOUNTER — TELEPHONE (OUTPATIENT)
Dept: OBGYN CLINIC | Facility: CLINIC | Age: 55
End: 2020-06-18

## 2020-06-19 RX ORDER — MORPHINE SULFATE 15 MG/1
15 TABLET, FILM COATED, EXTENDED RELEASE ORAL 2 TIMES DAILY
Qty: 60 TABLET | Refills: 0 | Status: SHIPPED | OUTPATIENT
Start: 2020-06-19 | End: 2020-07-27 | Stop reason: SDUPTHER

## 2020-06-23 ENCOUNTER — TELEPHONE (OUTPATIENT)
Dept: NEUROLOGY | Facility: CLINIC | Age: 55
End: 2020-06-23

## 2020-06-23 ENCOUNTER — PATIENT OUTREACH (OUTPATIENT)
Dept: INTERNAL MEDICINE CLINIC | Facility: CLINIC | Age: 55
End: 2020-06-23

## 2020-06-23 ENCOUNTER — TELEPHONE (OUTPATIENT)
Dept: INTERNAL MEDICINE CLINIC | Facility: CLINIC | Age: 55
End: 2020-06-23

## 2020-06-23 NOTE — TELEPHONE ENCOUNTER
Patient calling to obtain clarification on a refferral by Dr Niki Hastings to see a neurologist  Patient states she's confused with Dx code (Seizures) on the referral    Patient would like clarification before her appt with the specialist  (which is tomorrow)  Please advise

## 2020-06-23 NOTE — TELEPHONE ENCOUNTER
Please review patients chart and clarify whether or not she has a hx of seizures and if not can you please place a new order with correct dx attached  Thank you!

## 2020-06-25 ENCOUNTER — TELEPHONE (OUTPATIENT)
Dept: OBGYN CLINIC | Facility: HOSPITAL | Age: 55
End: 2020-06-25

## 2020-06-29 ENCOUNTER — TELEPHONE (OUTPATIENT)
Dept: INTERNAL MEDICINE CLINIC | Facility: CLINIC | Age: 55
End: 2020-06-29

## 2020-06-29 ENCOUNTER — TELEMEDICINE (OUTPATIENT)
Dept: INTERNAL MEDICINE CLINIC | Facility: CLINIC | Age: 55
End: 2020-06-29

## 2020-06-29 DIAGNOSIS — R21 RASH: Primary | ICD-10-CM

## 2020-06-29 PROCEDURE — G2012 BRIEF CHECK IN BY MD/QHP: HCPCS | Performed by: INTERNAL MEDICINE

## 2020-07-02 ENCOUNTER — TELEPHONE (OUTPATIENT)
Dept: NEUROSURGERY | Facility: CLINIC | Age: 55
End: 2020-07-02

## 2020-07-02 ENCOUNTER — TELEPHONE (OUTPATIENT)
Dept: OBGYN CLINIC | Facility: HOSPITAL | Age: 55
End: 2020-07-02

## 2020-07-02 NOTE — TELEPHONE ENCOUNTER
Called both Mobile and Temp number left a message for patient to call us back to set up her visco injection appointments       TO BE COMPLETED BY CENTRAL AUTH TEAM:     Physician: Dr Spencer Bhardwaj    Medication: Synvisc     Number of Injections in Series (Appointments scheduled 1 week apart from one another): 3    Schedule after this date: upon availability     Billing Info: [facility] [address]    Appointment Message Line: Bilateral Synvisc #1,2,3 [facility]  (please copy and paste appointment message line when scheduling appointment)    Additional Comments:

## 2020-07-06 ENCOUNTER — OFFICE VISIT (OUTPATIENT)
Dept: NEUROSURGERY | Facility: CLINIC | Age: 55
End: 2020-07-06
Payer: COMMERCIAL

## 2020-07-06 ENCOUNTER — TELEPHONE (OUTPATIENT)
Dept: NEUROSURGERY | Facility: CLINIC | Age: 55
End: 2020-07-06

## 2020-07-06 VITALS
HEART RATE: 90 BPM | SYSTOLIC BLOOD PRESSURE: 140 MMHG | WEIGHT: 206 LBS | HEIGHT: 58 IN | RESPIRATION RATE: 16 BRPM | BODY MASS INDEX: 43.24 KG/M2 | TEMPERATURE: 97.8 F | DIASTOLIC BLOOD PRESSURE: 98 MMHG

## 2020-07-06 DIAGNOSIS — Z11.59 SCREENING FOR VIRAL DISEASE: ICD-10-CM

## 2020-07-06 DIAGNOSIS — M96.1 POSTLAMINECTOMY SYNDROME: ICD-10-CM

## 2020-07-06 DIAGNOSIS — G89.4 CHRONIC PAIN SYNDROME: Primary | ICD-10-CM

## 2020-07-06 PROCEDURE — 99215 OFFICE O/P EST HI 40 MIN: CPT | Performed by: NEUROLOGICAL SURGERY

## 2020-07-06 PROCEDURE — 3008F BODY MASS INDEX DOCD: CPT | Performed by: NEUROLOGICAL SURGERY

## 2020-07-06 PROCEDURE — 3080F DIAST BP >= 90 MM HG: CPT | Performed by: NEUROLOGICAL SURGERY

## 2020-07-06 PROCEDURE — 1036F TOBACCO NON-USER: CPT | Performed by: NEUROLOGICAL SURGERY

## 2020-07-06 PROCEDURE — 3077F SYST BP >= 140 MM HG: CPT | Performed by: NEUROLOGICAL SURGERY

## 2020-07-06 RX ORDER — CHLORHEXIDINE GLUCONATE 0.12 MG/ML
15 RINSE ORAL ONCE
Status: CANCELLED | OUTPATIENT
Start: 2020-07-06 | End: 2020-07-06

## 2020-07-06 RX ORDER — VANCOMYCIN HYDROCHLORIDE 1 G/200ML
1000 INJECTION, SOLUTION INTRAVENOUS ONCE
Status: CANCELLED | OUTPATIENT
Start: 2020-07-21 | End: 2020-07-06

## 2020-07-06 NOTE — H&P (VIEW-ONLY)
Assessment/Plan:    No problem-specific Assessment & Plan notes found for this encounter  Patient is stable  Symptoms, as detailed in HPI, continue to significantly impact of patient's quality of life in daily activities  After carefully considering presentation, investigations, functional status and co-morbidities, the risk/benefit profile of surgical intervention is favorable  History, physical examination and diagnostic tests were reviewed and questions answered  Diagnosis, care plan and treatment options were discussed  The patient understand instructions and will follow up as directed  Patient with post laminectomy syndrome from a thoracic and lumbar fusion  She has low back and severe bilateral leg pain and reported 50% relief from a thoracic spinal cord stimulator trial with [employer]  I recommended a percutaneous thoracic implant with the increased risk of infection and injury due to her multilevel procedure  Expected postoperative course, including activity restrictions, expected pain and postoperative medication were reviewed  Patient provided verbal consent to surgical procedure and signed consent form: Yes    We also discussed the risks and benefits of the procedure the risks being including: infection (~5%), neurologic injury (1-2%), new pain, revisions surgery, failure to relieve pain, hardware issues  The benefits including relief of pain  The patient stated understanding of the risks and benefits and agreed to proceed  IMAGING REVIEWED: CT thoracic no obvious laminectomy defect spinous process removal multilevel instrumentation         Diagnoses and all orders for this visit:    Chronic pain syndrome  -     Case request operating room: [address], RIGHT; Standing  -     Case request operating room: [place] [address] GENERATOR, RIGHT  -     PAT Covid Screening; Future  -     UA w Reflex to Microscopic w Reflex to Culture  -     Comprehensive metabolic panel; Future  -     CBC and differential; Future  -     APTT; Future  -     Protime-INR; Future  -     HEMOGLOBIN A1C W/ EAG ESTIMATION; Future  -     EKG 12 lead; Future    Postlaminectomy syndrome  -     Case request operating room: INSERTION THORACIC DORSAL COLUMN SPINAL CORD STIMULATOR PERCUTANEOUS W IMPLANTABLE PULSE GENERATOR, RIGHT; Standing  -     Case request operating room: INSERTION THORACIC DORSAL COLUMN SPINAL CORD STIMULATOR PERCUTANEOUS W IMPLANTABLE PULSE GENERATOR, RIGHT  -     PAT Covid Screening; Future  -     UA w Reflex to Microscopic w Reflex to Culture  -     Comprehensive metabolic panel; Future  -     CBC and differential; Future  -     APTT; Future  -     Protime-INR; Future  -     HEMOGLOBIN A1C W/ EAG ESTIMATION; Future  -     EKG 12 lead; Future    Screening for viral disease  -     PAT Covid Screening; Future  -     UA w Reflex to Microscopic w Reflex to Culture  -     Comprehensive metabolic panel; Future  -     CBC and differential; Future  -     APTT; Future  -     Protime-INR; Future  -     HEMOGLOBIN A1C W/ EAG ESTIMATION; Future  -     EKG 12 lead; Future    Other orders  -     Diet NPO; Sips with meds; Standing  -     Nursing Communication [address] [place] [address] Interventions Implemented; Standing  -     Nursing Communication Use 2 CHG cloths, have staff wash the entire body (neck down) ; Standing  -     Nursing Communication Swab both nares with Povidone-Iodine solution, EXCLUDE if patient has shellfish/Iodine allergy; Standing  -     chlorhexidine (PERIDEX) 0 12 % oral rinse 15 mL  -     Void on call to OR; Standing  -     Insert peripheral IV;  Standing  -     vancomycin (VANCOCIN) IVPB (premix) 1,000 mg 200 mL        I have spent 40 minutes with Patient and family today in which greater than 50% of this time was spent in counseling/coordination of care regarding Diagnostic results, Prognosis, Risks and benefits of tx options, Intructions for management, Patient and family education, Importance of tx compliance, Risk factor reductions and Impressions  Subjective:      Patient ID: Cherylin Denver is a 62 y o  female  HPI    Patient with convoluted history but has a thoraco lumbar fusion was seen by me in [facility] and seen by Ruma Lantigua prior  She had a successful trial in [place] and reported documentation  She reports 50% relief of pain from stimulation during the trial  She wishes to proceed with surgery  The following portions of the patient's history were reviewed and updated as appropriate: She  has a past medical history of Acid reflux, Anxiety, Arthritis, Bipolar 2 disorder ([address] ), Depression, Familial tremor, Fibromyalgia, Hearing aid worn, [place] (hard of hearing), Hypertension, Left-sided weakness, Lower back pain, Memory loss of unknown cause, Migraine, Overactive bladder, Panic attack, Post traumatic stress disorder, Seasonal allergies, Stroke ([address] ), Thrombosis of cerebral arteries, Urinary incontinence, Wears dentures, and Wears glasses    She   Patient Active Problem List    Diagnosis Date Noted    Rash 06/03/2020    Primary osteoarthritis of both knees 05/01/2020    Patellofemoral disorder of both knees 05/01/2020    Tardive dyskinesia 03/09/2020    MIMI (obstructive sleep apnea)     Complaint related to dreams 02/13/2020    Morbid obesity with BMI of 40 0-44 9, adult ([facility] ) 02/06/2020    Family history of colorectal cancer 12/11/2019    Encounter for long-term use of opiate analgesic 12/03/2019    Post laminectomy syndrome 10/07/2019    Lumbar disc disease with radiculopathy 02/02/2018    Spinal stenosis of lumbar region with neurogenic claudication 02/02/2018    Lumbar radiculopathy 12/08/2017    Bilateral hip pain 06/19/2017    Primary localized osteoarthritis of both knees 06/16/2017    Chronic bilateral low back pain with bilateral sciatica 2017    Chronic pain disorder 2017    Opioid dependence ([facility] ) 2017    Sacroiliitis ([place] [address] ) 2017    Lumbar spondylosis 10/31/2016    Osteoarthritis of both hips 10/31/2016    Generalized anxiety disorder 10/26/2016    Anxiety 2016    Major depressive disorder, recurrent episode, moderate degree ([place] [address] ) 2016    Right knee pain 2016    Recent unexplained weight loss 2016    Fatigue 10/26/2015    Bipolar II disorder ([place] [address] ) 2015    Panic disorder without agoraphobia 2015    Post traumatic stress disorder 2015    Pain, joint, hip 2014    Intractable low back pain 2014    Tremor 2014    Migraine 2014    Esophageal reflux 2014    Cognitive disorder 2014    Female pelvic pain 10/30/2013    Pain in joint, shoulder region 10/28/2013    Overactive bladder 2013    Osteoarthritis of knee 2013    Insomnia 2013    Hypokalemia 2013    Urinary incontinence 2012    Vitamin D deficiency 2012    Fibromyalgia 2012    Hypertension 2012     She  has a past surgical history that includes Hysterectomy (); Tubal ligation (); Myringotomy w/ tubes (Left); Tonsillectomy; Colonoscopy; pr cystourethroscopy (N/A, 2016);  section (); EAR SURGERY; Back surgery; and Neck surgery (2019)  Her family history includes Alzheimer's disease in her father; Bipolar disorder in her brother; Breast cancer in her maternal aunt; Colon cancer in her brother, maternal aunt, and mother; Depression in her paternal grandfather; Diabetes in her other; Heart disease in her other; Hypertension in her other; No Known Problems in her brother and sister; Seizures in her son; Stroke in her father  She  reports that she has never smoked  She has never used smokeless tobacco  She reports that she drank alcohol   She reports that she does not use drugs   Current Outpatient Medications   Medication Sig Dispense Refill    amLODIPine (NORVASC) 5 mg tablet Take 1 tablet (5 mg total) by mouth daily 90 tablet 3    ammonium lactate (LAC-HYDRIN) 12 % cream Apply topically 2 (two) times a day Apply to abdomen, chest, and back 140 g 3    aspirin 81 MG tablet Take 1 tablet (81 mg total) by mouth daily 90 tablet 3    busPIRone (BUSPAR) 5 mg tablet TAKE 1 TABLET (5 MG TOTAL) BY MOUTH 3 (THREE) TIMES A DAY 90 tablet 0    cyclobenzaprine (FLEXERIL) 5 mg tablet Take 1 tablet (5 mg total) by mouth 3 (three) times a day as needed for muscle spasms 30 tablet 1    DULoxetine (CYMBALTA) 30 mg delayed release capsule Take 30 mg by mouth daily      DULoxetine (CYMBALTA) 60 mg delayed release capsule Take 1 capsule (60 mg total) by mouth daily 90 capsule 0    hydrocortisone 1 % cream Apply topically 4 (four) times a day as needed for rash On abdomen, arms, and chest apply to clean and dry area   45 g 1    hydrOXYzine HCL (ATARAX) 50 mg tablet Take 1 tablet (50 mg total) by mouth daily at bedtime as needed for anxiety (insomnia) 90 tablet 2    loratadine (CLARITIN) 10 mg tablet Take 1 tablet (10 mg total) by mouth daily 30 tablet 1    LORazepam (ATIVAN) 0 5 mg tablet Take 0 5 mg by mouth every 8 (eight) hours as needed      meloxicam (MOBIC) 15 mg tablet Take 1 tablet (15 mg total) by mouth daily as needed for moderate pain 30 tablet 2    morphine (MS CONTIN) 15 mg 12 hr tablet Take 1 tablet (15 mg total) by mouth 2 (two) times a dayMax Daily Amount: 30 mg 60 tablet 0    nystatin (MYCOSTATIN) ointment Apply topically 2 (two) times a day 30 g 3    omeprazole (PriLOSEC) 20 mg delayed release capsule Take 1 capsule (20 mg total) by mouth daily 90 capsule 3    oxybutynin (DITROPAN) 5 mg tablet Take 1 tablet (5 mg total) by mouth 2 (two) times a day 180 tablet 3    prazosin (MINIPRESS) 2 mg capsule TAKE 1 CAPSULE BY MOUTH EVERY DAY 30 capsule 1    pregabalin (LYRICA) 150 mg capsule Take 1 capsule (150 mg total) by mouth 3 (three) times a day 90 capsule 2    propranolol (INDERAL) 20 mg tablet Take 1 tablet (20 mg total) by mouth 2 (two) times a day 180 tablet 3    QUEtiapine (SEROquel XR) 300 mg 24 hr tablet Take 1 tablet (300 mg total) by mouth daily at bedtime 60 tablet 1    senna-docusate sodium (SENOKOT-S) 8 6-50 mg per tablet Take 1 tablet by mouth daily      traZODone (DESYREL) 150 mg tablet Take 1 tablet (150 mg total) by mouth daily at bedtime 30 tablet 1    acetaminophen (TYLENOL) 500 mg tablet Take 1 tablet (500 mg total) by mouth every 6 (six) hours as needed for mild pain (Patient not taking: Reported on 7/6/2020) 90 tablet 3    bisacodyl (DULCOLAX) 5 mg EC tablet Take 2 tablets (10 mg total) by mouth daily (Patient not taking: Reported on 7/6/2020) 30 tablet 0    cholecalciferol (VITAMIN D3) 1,000 units tablet Take 1 tablet (1,000 Units total) by mouth daily (Patient not taking: Reported on 7/6/2020) 30 tablet 1    diclofenac sodium (VOLTAREN) 1 % Apply 4 g topically 4 (four) times a day (Patient not taking: Reported on 7/6/2020) 30 Tube 0    lactulose 20 g/30 mL Take 30 mL (20 g total) by mouth daily (Patient not taking: Reported on 7/6/2020) 900 mL 2    lidocaine (LIDODERM) 5 % Apply 1 patch topically daily at bedtime Remove & Discard patch within 12 hours or as directed by MD (Patient not taking: Reported on 7/6/2020) 30 patch 0    melatonin 3 mg Take 2 tablets (6 mg total) by mouth daily at bedtime as needed (insomnia) (Patient not taking: Reported on 7/6/2020) 60 tablet 3    Valbenazine Tosylate (Ingrezza) 80 MG CAPS Take 80 mg by mouth daily       No current facility-administered medications for this visit        Current Outpatient Medications on File Prior to Visit   Medication Sig    amLODIPine (NORVASC) 5 mg tablet Take 1 tablet (5 mg total) by mouth daily    ammonium lactate (LAC-HYDRIN) 12 % cream Apply topically 2 (two) times a day Apply to abdomen, chest, and back    aspirin 81 MG tablet Take 1 tablet (81 mg total) by mouth daily    busPIRone (BUSPAR) 5 mg tablet TAKE 1 TABLET (5 MG TOTAL) BY MOUTH 3 (THREE) TIMES A DAY    cyclobenzaprine (FLEXERIL) 5 mg tablet Take 1 tablet (5 mg total) by mouth 3 (three) times a day as needed for muscle spasms    DULoxetine (CYMBALTA) 30 mg delayed release capsule Take 30 mg by mouth daily    DULoxetine (CYMBALTA) 60 mg delayed release capsule Take 1 capsule (60 mg total) by mouth daily    hydrocortisone 1 % cream Apply topically 4 (four) times a day as needed for rash On abdomen, arms, and chest apply to clean and dry area      hydrOXYzine HCL (ATARAX) 50 mg tablet Take 1 tablet (50 mg total) by mouth daily at bedtime as needed for anxiety (insomnia)    loratadine (CLARITIN) 10 mg tablet Take 1 tablet (10 mg total) by mouth daily    LORazepam (ATIVAN) 0 5 mg tablet Take 0 5 mg by mouth every 8 (eight) hours as needed    meloxicam (MOBIC) 15 mg tablet Take 1 tablet (15 mg total) by mouth daily as needed for moderate pain    morphine (MS CONTIN) 15 mg 12 hr tablet Take 1 tablet (15 mg total) by mouth 2 (two) times a dayMax Daily Amount: 30 mg    nystatin (MYCOSTATIN) ointment Apply topically 2 (two) times a day    omeprazole (PriLOSEC) 20 mg delayed release capsule Take 1 capsule (20 mg total) by mouth daily    oxybutynin (DITROPAN) 5 mg tablet Take 1 tablet (5 mg total) by mouth 2 (two) times a day    prazosin (MINIPRESS) 2 mg capsule TAKE 1 CAPSULE BY MOUTH EVERY DAY    pregabalin (LYRICA) 150 mg capsule Take 1 capsule (150 mg total) by mouth 3 (three) times a day    propranolol (INDERAL) 20 mg tablet Take 1 tablet (20 mg total) by mouth 2 (two) times a day    QUEtiapine (SEROquel XR) 300 mg 24 hr tablet Take 1 tablet (300 mg total) by mouth daily at bedtime    senna-docusate sodium (SENOKOT-S) 8 6-50 mg per tablet Take 1 tablet by mouth daily    traZODone (DESYREL) 150 mg tablet Take 1 tablet (150 mg total) by mouth daily at bedtime    acetaminophen (TYLENOL) 500 mg tablet Take 1 tablet (500 mg total) by mouth every 6 (six) hours as needed for mild pain (Patient not taking: Reported on 7/6/2020)    bisacodyl (DULCOLAX) 5 mg EC tablet Take 2 tablets (10 mg total) by mouth daily (Patient not taking: Reported on 7/6/2020)    cholecalciferol (VITAMIN D3) 1,000 units tablet Take 1 tablet (1,000 Units total) by mouth daily (Patient not taking: Reported on 7/6/2020)    diclofenac sodium (VOLTAREN) 1 % Apply 4 g topically 4 (four) times a day (Patient not taking: Reported on 7/6/2020)    lactulose 20 g/30 mL Take 30 mL (20 g total) by mouth daily (Patient not taking: Reported on 7/6/2020)    lidocaine (LIDODERM) 5 % Apply 1 patch topically daily at bedtime Remove & Discard patch within 12 hours or as directed by MD (Patient not taking: Reported on 7/6/2020)    melatonin 3 mg Take 2 tablets (6 mg total) by mouth daily at bedtime as needed (insomnia) (Patient not taking: Reported on 7/6/2020)    Valbenazine Tosylate (Ingrezza) 80 MG CAPS Take 80 mg by mouth daily     No current facility-administered medications on file prior to visit  She has No Known Allergies       Review of Systems   Constitutional: Positive for fatigue  Chills: when pain increases    HENT: Negative  Eyes: Negative  Respiratory: Negative  Cardiovascular: Negative  Gastrointestinal: Positive for constipation  Endocrine: Negative  Genitourinary: Positive for frequency  Negative for urgency  Musculoskeletal: Positive for back pain (up/down spine, across lower back radiates into bilateral hips, bilateral buttock, and down bilateral legs/knees down into feet), gait problem and joint swelling  Uses walker for assistance   Standing up to walk is getting harder  Leg pain is making it harder to walk  Skin: Negative  Allergic/Immunologic: Negative      Neurological: Positive for weakness (bilateral legs ), numbness (bilateral legs numbness and tingling ) and headaches (migraines )  Negative for dizziness, seizures and syncope  Hematological: Does not bruise/bleed easily (patient on ASA 81)  Psychiatric/Behavioral: Positive for sleep disturbance (due to pain )  Negative for confusion  Objective:      /98 (BP Location: Right arm)   Pulse 90   Temp 97 8 °F (36 6 °C) (Tympanic)   Resp 16   Ht 4' 10" (1 473 m)   Wt 93 4 kg (206 lb)   BMI 43 05 kg/m²          Physical Exam   Constitutional: She is oriented to person, place, and time  She appears well-developed and well-nourished  No distress  HENT:   Head: Normocephalic and atraumatic  Eyes: Pupils are equal, round, and reactive to light  Conjunctivae and EOM are normal    Neck: Normal range of motion  No tracheal deviation present  No thyromegaly present  Cardiovascular: Normal rate  Pulmonary/Chest: Effort normal and breath sounds normal    Musculoskeletal: Normal range of motion  Neurological: She is alert and oriented to person, place, and time  No cranial nerve deficit  Some mild cognitive deficits  Deconditioning BLE  Good symmetric movement  Uses walker   Skin: She is not diaphoretic  Psychiatric: She has a normal mood and affect   Her behavior is normal

## 2020-07-06 NOTE — PROGRESS NOTES
Assessment/Plan:    No problem-specific Assessment & Plan notes found for this encounter  Patient is stable  Symptoms, as detailed in HPI, continue to significantly impact of patient's quality of life in daily activities  After carefully considering presentation, investigations, functional status and co-morbidities, the risk/benefit profile of surgical intervention is favorable  History, physical examination and diagnostic tests were reviewed and questions answered  Diagnosis, care plan and treatment options were discussed  The patient understand instructions and will follow up as directed  Patient with post laminectomy syndrome from a thoracic and lumbar fusion  She has low back and severe bilateral leg pain and reported 50% relief from a thoracic spinal cord stimulator trial with [employer]  I recommended a percutaneous thoracic implant with the increased risk of infection and injury due to her multilevel procedure  Expected postoperative course, including activity restrictions, expected pain and postoperative medication were reviewed  Patient provided verbal consent to surgical procedure and signed consent form: Yes    We also discussed the risks and benefits of the procedure the risks being including: infection (~5%), neurologic injury (1-2%), new pain, revisions surgery, failure to relieve pain, hardware issues  The benefits including relief of pain  The patient stated understanding of the risks and benefits and agreed to proceed  IMAGING REVIEWED: CT thoracic no obvious laminectomy defect spinous process removal multilevel instrumentation         Diagnoses and all orders for this visit:    Chronic pain syndrome  -     Case request operating room: [address], RIGHT; Standing  -     Case request operating room: [place] [id number] GENERATOR, RIGHT  -     PAT Covid Screening; Future  -     UA w Reflex to Microscopic w Reflex to Culture  -     Comprehensive metabolic panel; Future  -     CBC and differential; Future  -     APTT; Future  -     Protime-INR; Future  -     HEMOGLOBIN A1C W/ EAG ESTIMATION; Future  -     EKG 12 lead; Future    Postlaminectomy syndrome  -     Case request operating room: INSERTION THORACIC DORSAL COLUMN SPINAL CORD STIMULATOR PERCUTANEOUS W IMPLANTABLE PULSE GENERATOR, RIGHT; Standing  -     Case request operating room: INSERTION THORACIC DORSAL COLUMN SPINAL CORD STIMULATOR PERCUTANEOUS W IMPLANTABLE PULSE GENERATOR, RIGHT  -     PAT Covid Screening; Future  -     UA w Reflex to Microscopic w Reflex to Culture  -     Comprehensive metabolic panel; Future  -     CBC and differential; Future  -     APTT; Future  -     Protime-INR; Future  -     HEMOGLOBIN A1C W/ EAG ESTIMATION; Future  -     EKG 12 lead; Future    Screening for viral disease  -     PAT Covid Screening; Future  -     UA w Reflex to Microscopic w Reflex to Culture  -     Comprehensive metabolic panel; Future  -     CBC and differential; Future  -     APTT; Future  -     Protime-INR; Future  -     HEMOGLOBIN A1C W/ EAG ESTIMATION; Future  -     EKG 12 lead; Future    Other orders  -     Diet NPO; Sips with meds; Standing  -     Nursing Communication [address] [place] [address] Interventions Implemented; Standing  -     Nursing Communication Use 2 CHG cloths, have staff wash the entire body (neck down) ; Standing  -     Nursing Communication Swab both nares with Povidone-Iodine solution, EXCLUDE if patient has shellfish/Iodine allergy; Standing  -     chlorhexidine (PERIDEX) 0 12 % oral rinse 15 mL  -     Void on call to OR; Standing  -     Insert peripheral IV;  Standing  -     vancomycin (VANCOCIN) IVPB (premix) 1,000 mg 200 mL        I have spent 40 minutes with Patient and family today in which greater than 50% of this time was spent in counseling/coordination of care regarding Diagnostic results, Prognosis, Risks and benefits of tx options, Intructions for management, Patient and family education, Importance of tx compliance, Risk factor reductions and Impressions  Subjective:      Patient ID: Alessandra Springer is a 62 y o  female  HPI    Patient with convoluted history but has a thoraco lumbar fusion was seen by me in [facility] and seen by Carles Canavan prior  She had a successful trial in [place] and reported documentation  She reports 50% relief of pain from stimulation during the trial  She wishes to proceed with surgery  The following portions of the patient's history were reviewed and updated as appropriate: She  has a past medical history of Acid reflux, Anxiety, Arthritis, Bipolar 2 disorder ([address] ), Depression, Familial tremor, Fibromyalgia, Hearing aid worn, [place] (hard of hearing), Hypertension, Left-sided weakness, Lower back pain, Memory loss of unknown cause, Migraine, Overactive bladder, Panic attack, Post traumatic stress disorder, Seasonal allergies, Stroke ([facility] ), Thrombosis of cerebral arteries, Urinary incontinence, Wears dentures, and Wears glasses    She   Patient Active Problem List    Diagnosis Date Noted    Rash 06/03/2020    Primary osteoarthritis of both knees 05/01/2020    Patellofemoral disorder of both knees 05/01/2020    Tardive dyskinesia 03/09/2020    MIMI (obstructive sleep apnea)     Complaint related to dreams 02/13/2020    Morbid obesity with BMI of 40 0-44 9, adult ([facility] ) 02/06/2020    Family history of colorectal cancer 12/11/2019    Encounter for long-term use of opiate analgesic 12/03/2019    Post laminectomy syndrome 10/07/2019    Lumbar disc disease with radiculopathy 02/02/2018    Spinal stenosis of lumbar region with neurogenic claudication 02/02/2018    Lumbar radiculopathy 12/08/2017    Bilateral hip pain 06/19/2017    Primary localized osteoarthritis of both knees 06/16/2017    Chronic bilateral low back pain with bilateral sciatica 2017    Chronic pain disorder 2017    Opioid dependence ([facility] ) 2017    Sacroiliitis ([place] [address] ) 2017    Lumbar spondylosis 10/31/2016    Osteoarthritis of both hips 10/31/2016    Generalized anxiety disorder 10/26/2016    Anxiety 2016    Major depressive disorder, recurrent episode, moderate degree ([place] [address] ) 2016    Right knee pain 2016    Recent unexplained weight loss 2016    Fatigue 10/26/2015    Bipolar II disorder ([place] [address] ) 2015    Panic disorder without agoraphobia 2015    Post traumatic stress disorder 2015    Pain, joint, hip 2014    Intractable low back pain 2014    Tremor 2014    Migraine 2014    Esophageal reflux 2014    Cognitive disorder 2014    Female pelvic pain 10/30/2013    Pain in joint, shoulder region 10/28/2013    Overactive bladder 2013    Osteoarthritis of knee 2013    Insomnia 2013    Hypokalemia 2013    Urinary incontinence 2012    Vitamin D deficiency 2012    Fibromyalgia 2012    Hypertension 2012     She  has a past surgical history that includes Hysterectomy (); Tubal ligation (); Myringotomy w/ tubes (Left); Tonsillectomy; Colonoscopy; pr cystourethroscopy (N/A, 2016);  section (); EAR SURGERY; Back surgery; and Neck surgery (2019)  Her family history includes Alzheimer's disease in her father; Bipolar disorder in her brother; Breast cancer in her maternal aunt; Colon cancer in her brother, maternal aunt, and mother; Depression in her paternal grandfather; Diabetes in her other; Heart disease in her other; Hypertension in her other; No Known Problems in her brother and sister; Seizures in her son; Stroke in her father  She  reports that she has never smoked  She has never used smokeless tobacco  She reports that she drank alcohol   She reports that she does not use drugs   Current Outpatient Medications   Medication Sig Dispense Refill    amLODIPine (NORVASC) 5 mg tablet Take 1 tablet (5 mg total) by mouth daily 90 tablet 3    ammonium lactate (LAC-HYDRIN) 12 % cream Apply topically 2 (two) times a day Apply to abdomen, chest, and back 140 g 3    aspirin 81 MG tablet Take 1 tablet (81 mg total) by mouth daily 90 tablet 3    busPIRone (BUSPAR) 5 mg tablet TAKE 1 TABLET (5 MG TOTAL) BY MOUTH 3 (THREE) TIMES A DAY 90 tablet 0    cyclobenzaprine (FLEXERIL) 5 mg tablet Take 1 tablet (5 mg total) by mouth 3 (three) times a day as needed for muscle spasms 30 tablet 1    DULoxetine (CYMBALTA) 30 mg delayed release capsule Take 30 mg by mouth daily      DULoxetine (CYMBALTA) 60 mg delayed release capsule Take 1 capsule (60 mg total) by mouth daily 90 capsule 0    hydrocortisone 1 % cream Apply topically 4 (four) times a day as needed for rash On abdomen, arms, and chest apply to clean and dry area   45 g 1    hydrOXYzine HCL (ATARAX) 50 mg tablet Take 1 tablet (50 mg total) by mouth daily at bedtime as needed for anxiety (insomnia) 90 tablet 2    loratadine (CLARITIN) 10 mg tablet Take 1 tablet (10 mg total) by mouth daily 30 tablet 1    LORazepam (ATIVAN) 0 5 mg tablet Take 0 5 mg by mouth every 8 (eight) hours as needed      meloxicam (MOBIC) 15 mg tablet Take 1 tablet (15 mg total) by mouth daily as needed for moderate pain 30 tablet 2    morphine (MS CONTIN) 15 mg 12 hr tablet Take 1 tablet (15 mg total) by mouth 2 (two) times a dayMax Daily Amount: 30 mg 60 tablet 0    nystatin (MYCOSTATIN) ointment Apply topically 2 (two) times a day 30 g 3    omeprazole (PriLOSEC) 20 mg delayed release capsule Take 1 capsule (20 mg total) by mouth daily 90 capsule 3    oxybutynin (DITROPAN) 5 mg tablet Take 1 tablet (5 mg total) by mouth 2 (two) times a day 180 tablet 3    prazosin (MINIPRESS) 2 mg capsule TAKE 1 CAPSULE BY MOUTH EVERY DAY 30 capsule 1    pregabalin (LYRICA) 150 mg capsule Take 1 capsule (150 mg total) by mouth 3 (three) times a day 90 capsule 2    propranolol (INDERAL) 20 mg tablet Take 1 tablet (20 mg total) by mouth 2 (two) times a day 180 tablet 3    QUEtiapine (SEROquel XR) 300 mg 24 hr tablet Take 1 tablet (300 mg total) by mouth daily at bedtime 60 tablet 1    senna-docusate sodium (SENOKOT-S) 8 6-50 mg per tablet Take 1 tablet by mouth daily      traZODone (DESYREL) 150 mg tablet Take 1 tablet (150 mg total) by mouth daily at bedtime 30 tablet 1    acetaminophen (TYLENOL) 500 mg tablet Take 1 tablet (500 mg total) by mouth every 6 (six) hours as needed for mild pain (Patient not taking: Reported on 7/6/2020) 90 tablet 3    bisacodyl (DULCOLAX) 5 mg EC tablet Take 2 tablets (10 mg total) by mouth daily (Patient not taking: Reported on 7/6/2020) 30 tablet 0    cholecalciferol (VITAMIN D3) 1,000 units tablet Take 1 tablet (1,000 Units total) by mouth daily (Patient not taking: Reported on 7/6/2020) 30 tablet 1    diclofenac sodium (VOLTAREN) 1 % Apply 4 g topically 4 (four) times a day (Patient not taking: Reported on 7/6/2020) 30 Tube 0    lactulose 20 g/30 mL Take 30 mL (20 g total) by mouth daily (Patient not taking: Reported on 7/6/2020) 900 mL 2    lidocaine (LIDODERM) 5 % Apply 1 patch topically daily at bedtime Remove & Discard patch within 12 hours or as directed by MD (Patient not taking: Reported on 7/6/2020) 30 patch 0    melatonin 3 mg Take 2 tablets (6 mg total) by mouth daily at bedtime as needed (insomnia) (Patient not taking: Reported on 7/6/2020) 60 tablet 3    Valbenazine Tosylate (Ingrezza) 80 MG CAPS Take 80 mg by mouth daily       No current facility-administered medications for this visit        Current Outpatient Medications on File Prior to Visit   Medication Sig    amLODIPine (NORVASC) 5 mg tablet Take 1 tablet (5 mg total) by mouth daily    ammonium lactate (LAC-HYDRIN) 12 % cream Apply topically 2 (two) times a day Apply to abdomen, chest, and back    aspirin 81 MG tablet Take 1 tablet (81 mg total) by mouth daily    busPIRone (BUSPAR) 5 mg tablet TAKE 1 TABLET (5 MG TOTAL) BY MOUTH 3 (THREE) TIMES A DAY    cyclobenzaprine (FLEXERIL) 5 mg tablet Take 1 tablet (5 mg total) by mouth 3 (three) times a day as needed for muscle spasms    DULoxetine (CYMBALTA) 30 mg delayed release capsule Take 30 mg by mouth daily    DULoxetine (CYMBALTA) 60 mg delayed release capsule Take 1 capsule (60 mg total) by mouth daily    hydrocortisone 1 % cream Apply topically 4 (four) times a day as needed for rash On abdomen, arms, and chest apply to clean and dry area      hydrOXYzine HCL (ATARAX) 50 mg tablet Take 1 tablet (50 mg total) by mouth daily at bedtime as needed for anxiety (insomnia)    loratadine (CLARITIN) 10 mg tablet Take 1 tablet (10 mg total) by mouth daily    LORazepam (ATIVAN) 0 5 mg tablet Take 0 5 mg by mouth every 8 (eight) hours as needed    meloxicam (MOBIC) 15 mg tablet Take 1 tablet (15 mg total) by mouth daily as needed for moderate pain    morphine (MS CONTIN) 15 mg 12 hr tablet Take 1 tablet (15 mg total) by mouth 2 (two) times a dayMax Daily Amount: 30 mg    nystatin (MYCOSTATIN) ointment Apply topically 2 (two) times a day    omeprazole (PriLOSEC) 20 mg delayed release capsule Take 1 capsule (20 mg total) by mouth daily    oxybutynin (DITROPAN) 5 mg tablet Take 1 tablet (5 mg total) by mouth 2 (two) times a day    prazosin (MINIPRESS) 2 mg capsule TAKE 1 CAPSULE BY MOUTH EVERY DAY    pregabalin (LYRICA) 150 mg capsule Take 1 capsule (150 mg total) by mouth 3 (three) times a day    propranolol (INDERAL) 20 mg tablet Take 1 tablet (20 mg total) by mouth 2 (two) times a day    QUEtiapine (SEROquel XR) 300 mg 24 hr tablet Take 1 tablet (300 mg total) by mouth daily at bedtime    senna-docusate sodium (SENOKOT-S) 8 6-50 mg per tablet Take 1 tablet by mouth daily    traZODone (DESYREL) 150 mg tablet Take 1 tablet (150 mg total) by mouth daily at bedtime    acetaminophen (TYLENOL) 500 mg tablet Take 1 tablet (500 mg total) by mouth every 6 (six) hours as needed for mild pain (Patient not taking: Reported on 7/6/2020)    bisacodyl (DULCOLAX) 5 mg EC tablet Take 2 tablets (10 mg total) by mouth daily (Patient not taking: Reported on 7/6/2020)    cholecalciferol (VITAMIN D3) 1,000 units tablet Take 1 tablet (1,000 Units total) by mouth daily (Patient not taking: Reported on 7/6/2020)    diclofenac sodium (VOLTAREN) 1 % Apply 4 g topically 4 (four) times a day (Patient not taking: Reported on 7/6/2020)    lactulose 20 g/30 mL Take 30 mL (20 g total) by mouth daily (Patient not taking: Reported on 7/6/2020)    lidocaine (LIDODERM) 5 % Apply 1 patch topically daily at bedtime Remove & Discard patch within 12 hours or as directed by MD (Patient not taking: Reported on 7/6/2020)    melatonin 3 mg Take 2 tablets (6 mg total) by mouth daily at bedtime as needed (insomnia) (Patient not taking: Reported on 7/6/2020)    Valbenazine Tosylate (Ingrezza) 80 MG CAPS Take 80 mg by mouth daily     No current facility-administered medications on file prior to visit  She has No Known Allergies       Review of Systems   Constitutional: Positive for fatigue  Chills: when pain increases    HENT: Negative  Eyes: Negative  Respiratory: Negative  Cardiovascular: Negative  Gastrointestinal: Positive for constipation  Endocrine: Negative  Genitourinary: Positive for frequency  Negative for urgency  Musculoskeletal: Positive for back pain (up/down spine, across lower back radiates into bilateral hips, bilateral buttock, and down bilateral legs/knees down into feet), gait problem and joint swelling  Uses walker for assistance   Standing up to walk is getting harder  Leg pain is making it harder to walk  Skin: Negative  Allergic/Immunologic: Negative      Neurological: Positive for weakness (bilateral legs ), numbness (bilateral legs numbness and tingling ) and headaches (migraines )  Negative for dizziness, seizures and syncope  Hematological: Does not bruise/bleed easily (patient on ASA 81)  Psychiatric/Behavioral: Positive for sleep disturbance (due to pain )  Negative for confusion  Objective:      /98 (BP Location: Right arm)   Pulse 90   Temp 97 8 °F (36 6 °C) (Tympanic)   Resp 16   Ht 4' 10" (1 473 m)   Wt 93 4 kg (206 lb)   BMI 43 05 kg/m²          Physical Exam   Constitutional: She is oriented to person, place, and time  She appears well-developed and well-nourished  No distress  HENT:   Head: Normocephalic and atraumatic  Eyes: Pupils are equal, round, and reactive to light  Conjunctivae and EOM are normal    Neck: Normal range of motion  No tracheal deviation present  No thyromegaly present  Cardiovascular: Normal rate  Pulmonary/Chest: Effort normal and breath sounds normal    Musculoskeletal: Normal range of motion  Neurological: She is alert and oriented to person, place, and time  No cranial nerve deficit  Some mild cognitive deficits  Deconditioning BLE  Good symmetric movement  Uses walker   Skin: She is not diaphoretic  Psychiatric: She has a normal mood and affect   Her behavior is normal

## 2020-07-06 NOTE — TELEPHONE ENCOUNTER
Operative Procedure INSERTION THORACIC DORSAL COLUMN SPINAL CORD STIMULATOR PERCUTANEOUS W IMPLANTABLE PULSE GENERATOR, RIGHT   Date Pending    [employer]     Assessment for comorbid medical conditions:   HO adverse response to general anesthesia:denies   Surgical Procedures past 6 months:denies   Infection (MRSA ESBL  CDIFF ):denies   Autoimmune Disease:denies   Cardiac:denies   Pulmonary: Smoke (no ) O2 use (no ) MIMI/CPAP (no ) COPD/Asthma (no )  Endocrine:  Denies   MISC/Oncology/hematology: denies    Skin intact----buttock with several areas of pinkish skin appearance of prior open areas that scabbed  nathaniel healed lacking pigmentation--reports had a rash has been scratching her buttocks, has appointment to see Dr Jonathan Ray- (urostomy, colostomy, ileostomy, intermittent catheterization): denies     Personal history of venous thromboembolic disease: denies   Imagining: MRI T/S 3/10/20 images in PACS, viewed   Pain management:  MS contin 15 mg po every 12 hours--DR Kendall (unsure if he prescribes verify)  lyrica TID   Hold Anticoagulant agents pre and postoperative (AC, Antiplatelet, ASA, NSAID, vitamins , dietary supplements , OTC, immunosuppressant ) ASA diclofenac   Vitamins     Provided overview of surgical process from office appointment thru 6 weeks post op:hand out surgery  instructions     Patient verbalized understanding, all questions were answered and contact information provided if additional questions arise

## 2020-07-08 ENCOUNTER — HOSPITAL ENCOUNTER (OUTPATIENT)
Dept: NON INVASIVE DIAGNOSTICS | Facility: HOSPITAL | Age: 55
Discharge: HOME | End: 2020-07-08
Attending: NEUROLOGICAL SURGERY
Payer: COMMERCIAL

## 2020-07-08 ENCOUNTER — PATIENT OUTREACH (OUTPATIENT)
Dept: INTERNAL MEDICINE CLINIC | Facility: CLINIC | Age: 55
End: 2020-07-08

## 2020-07-08 ENCOUNTER — LAB (OUTPATIENT)
Dept: LAB | Facility: HOSPITAL | Age: 55
End: 2020-07-08
Attending: NEUROLOGICAL SURGERY
Payer: COMMERCIAL

## 2020-07-08 ENCOUNTER — TELEPHONE (OUTPATIENT)
Dept: OBGYN CLINIC | Facility: HOSPITAL | Age: 55
End: 2020-07-08

## 2020-07-08 DIAGNOSIS — E87.6 HYPOKALEMIA: ICD-10-CM

## 2020-07-08 DIAGNOSIS — Z11.59 SCREENING FOR VIRAL DISEASE: ICD-10-CM

## 2020-07-08 DIAGNOSIS — E87.6 HYPOKALEMIA: Primary | ICD-10-CM

## 2020-07-08 DIAGNOSIS — M96.1 POSTLAMINECTOMY SYNDROME: ICD-10-CM

## 2020-07-08 DIAGNOSIS — G89.4 CHRONIC PAIN SYNDROME: ICD-10-CM

## 2020-07-08 LAB
ALBUMIN SERPL BCP-MCNC: 3.1 G/DL (ref 3.5–5)
ALP SERPL-CCNC: 119 U/L (ref 46–116)
ALT SERPL W P-5'-P-CCNC: 15 U/L (ref 12–78)
ANION GAP SERPL CALCULATED.3IONS-SCNC: 8 MMOL/L (ref 4–13)
APTT PPP: 31 SECONDS (ref 23–37)
AST SERPL W P-5'-P-CCNC: 16 U/L (ref 5–45)
BACTERIA UR QL AUTO: ABNORMAL /HPF
BASOPHILS # BLD AUTO: 0.04 THOUSANDS/ΜL (ref 0–0.1)
BASOPHILS NFR BLD AUTO: 1 % (ref 0–1)
BILIRUB SERPL-MCNC: 0.4 MG/DL (ref 0.2–1)
BILIRUB UR QL STRIP: NEGATIVE
BUN SERPL-MCNC: 3 MG/DL (ref 5–25)
CALCIUM SERPL-MCNC: 8.2 MG/DL (ref 8.3–10.1)
CHLORIDE SERPL-SCNC: 106 MMOL/L (ref 100–108)
CLARITY UR: CLEAR
CO2 SERPL-SCNC: 30 MMOL/L (ref 21–32)
COLOR UR: YELLOW
CREAT SERPL-MCNC: 0.86 MG/DL (ref 0.6–1.3)
EOSINOPHIL # BLD AUTO: 0.22 THOUSAND/ΜL (ref 0–0.61)
EOSINOPHIL NFR BLD AUTO: 5 % (ref 0–6)
ERYTHROCYTE [DISTWIDTH] IN BLOOD BY AUTOMATED COUNT: 13.3 % (ref 11.6–15.1)
EST. AVERAGE GLUCOSE BLD GHB EST-MCNC: 103 MG/DL
GFR SERPL CREATININE-BSD FRML MDRD: 87 ML/MIN/1.73SQ M
GLUCOSE P FAST SERPL-MCNC: 94 MG/DL (ref 65–99)
GLUCOSE UR STRIP-MCNC: NEGATIVE MG/DL
HBA1C MFR BLD: 5.2 %
HCT VFR BLD AUTO: 39.2 % (ref 34.8–46.1)
HGB BLD-MCNC: 12.5 G/DL (ref 11.5–15.4)
HGB UR QL STRIP.AUTO: NEGATIVE
IMM GRANULOCYTES # BLD AUTO: 0.03 THOUSAND/UL (ref 0–0.2)
IMM GRANULOCYTES NFR BLD AUTO: 1 % (ref 0–2)
INR PPP: 0.92 (ref 0.84–1.19)
KETONES UR STRIP-MCNC: NEGATIVE MG/DL
LEUKOCYTE ESTERASE UR QL STRIP: ABNORMAL
LYMPHOCYTES # BLD AUTO: 1.5 THOUSANDS/ΜL (ref 0.6–4.47)
LYMPHOCYTES NFR BLD AUTO: 31 % (ref 14–44)
MAGNESIUM SERPL-MCNC: 1.7 MG/DL (ref 1.6–2.6)
MCH RBC QN AUTO: 29.1 PG (ref 26.8–34.3)
MCHC RBC AUTO-ENTMCNC: 31.9 G/DL (ref 31.4–37.4)
MCV RBC AUTO: 91 FL (ref 82–98)
MONOCYTES # BLD AUTO: 0.46 THOUSAND/ΜL (ref 0.17–1.22)
MONOCYTES NFR BLD AUTO: 10 % (ref 4–12)
NEUTROPHILS # BLD AUTO: 2.53 THOUSANDS/ΜL (ref 1.85–7.62)
NEUTS SEG NFR BLD AUTO: 52 % (ref 43–75)
NITRITE UR QL STRIP: NEGATIVE
NON-SQ EPI CELLS URNS QL MICRO: ABNORMAL /HPF
NRBC BLD AUTO-RTO: 0 /100 WBCS
PH UR STRIP.AUTO: 6.5 [PH]
PLATELET # BLD AUTO: 319 THOUSANDS/UL (ref 149–390)
PMV BLD AUTO: 9.4 FL (ref 8.9–12.7)
POTASSIUM SERPL-SCNC: 3.3 MMOL/L (ref 3.5–5.3)
PROT SERPL-MCNC: 6.7 G/DL (ref 6.4–8.2)
PROT UR STRIP-MCNC: NEGATIVE MG/DL
PROTHROMBIN TIME: 12.4 SECONDS (ref 11.6–14.5)
RBC # BLD AUTO: 4.29 MILLION/UL (ref 3.81–5.12)
RBC #/AREA URNS AUTO: ABNORMAL /HPF
SODIUM SERPL-SCNC: 144 MMOL/L (ref 136–145)
SP GR UR STRIP.AUTO: <=1.005 (ref 1–1.03)
UROBILINOGEN UR QL STRIP.AUTO: 0.2 E.U./DL
WBC # BLD AUTO: 4.78 THOUSAND/UL (ref 4.31–10.16)
WBC #/AREA URNS AUTO: ABNORMAL /HPF

## 2020-07-08 PROCEDURE — 83735 ASSAY OF MAGNESIUM: CPT

## 2020-07-08 PROCEDURE — 81001 URINALYSIS AUTO W/SCOPE: CPT | Performed by: NEUROLOGICAL SURGERY

## 2020-07-08 PROCEDURE — 93005 ELECTROCARDIOGRAM TRACING: CPT

## 2020-07-08 PROCEDURE — 83036 HEMOGLOBIN GLYCOSYLATED A1C: CPT

## 2020-07-08 PROCEDURE — 80053 COMPREHEN METABOLIC PANEL: CPT

## 2020-07-08 PROCEDURE — 85025 COMPLETE CBC W/AUTO DIFF WBC: CPT

## 2020-07-08 PROCEDURE — 36415 COLL VENOUS BLD VENIPUNCTURE: CPT

## 2020-07-08 PROCEDURE — 85730 THROMBOPLASTIN TIME PARTIAL: CPT

## 2020-07-08 PROCEDURE — 85610 PROTHROMBIN TIME: CPT

## 2020-07-08 RX ORDER — POTASSIUM CHLORIDE 20 MEQ/1
20 TABLET, EXTENDED RELEASE ORAL DAILY
Qty: 3 TABLET | Refills: 0 | Status: SHIPPED | OUTPATIENT
Start: 2020-07-08 | End: 2020-07-20

## 2020-07-08 NOTE — TELEPHONE ENCOUNTER
Patient DR Giovany Manzanares  Re: Bilateral knee  CB# [phone]     Patient states both knees are swollen and she can not walk down the stairs Patient is scheduled for back surgery 07 21    Patient wants to know if injections will help her knees, or if she should wait until after back surgery

## 2020-07-08 NOTE — TELEPHONE ENCOUNTER
Please contact the patient you can let her know that she might benefit from injections if it has been  Over 3 months since her most recent corticosteroid injections however given that she is having upcoming back surgery she should talk to  Her surgeon 1st to make sure that they are okay with her getting cortisone injections in the knees this soon before surgery    If they are okay with that, then she can be scheduled for injections

## 2020-07-08 NOTE — PROGRESS NOTES
Outpatient Care Management Note:    Received message from patient requesting a call back  I returned call to patient this morning  She reports she will be having a spinal cord stimulator placed on 7/21/2020 and needs medical clearance  Patient is scheduled for Thursday 7/16 @ 8:30 am with Dr Harshal Chen  Patient reports her friend will be bringing her to the appointment  [facility] and transportation with her insurance if needed  Patient reports she is currently living with her daughter and her 4 younger children in a home in [address]  She reports the address on file is her son's house and wishes to keep it like that  [facility] [address] [facility] on [address] and [facility] that was also listed  Patient made aware the ortho office did try reaching her last week to schedule her knee injections  She will return the call to the office she reports  Patient reports she has all her medication at this time and trying to get a refill on her Lorazepam from her mental health provider  Patient reports where she lives she has steps do to but does have a 1st floor setup where her bathroom and room are on the same floor  Patient feels she has enough support and help with her daughter at home  Patient voices frustration with her speech  She reports difficult to get her words out and sometimes think of what she wants to say  Patient reports she has had speech therapy in the past but does not recall when and where  She wants to talk with PCP office further about this and what suggestions they have to help her  Patient did bloodwork today and is aware of her COVID testing to be done before her upcoming procedure  Patient does not have any further questions, concerns, or other needs at this time  Pt has my contact # and PCP office # if needed  Pt is agreeable for further outreach

## 2020-07-09 DIAGNOSIS — I10 HYPERTENSION, UNSPECIFIED TYPE: ICD-10-CM

## 2020-07-09 DIAGNOSIS — M17.0 PRIMARY LOCALIZED OSTEOARTHRITIS OF BOTH KNEES: Primary | ICD-10-CM

## 2020-07-09 LAB
ATRIAL RATE: 77 BPM
P AXIS: 59 DEGREES
PR INTERVAL: 140 MS
QRS AXIS: 56 DEGREES
QRSD INTERVAL: 84 MS
QT INTERVAL: 410 MS
QTC INTERVAL: 463 MS
T WAVE AXIS: 12 DEGREES
VENTRICULAR RATE: 77 BPM

## 2020-07-09 PROCEDURE — 93010 ELECTROCARDIOGRAM REPORT: CPT | Performed by: INTERNAL MEDICINE

## 2020-07-09 RX ORDER — PRAZOSIN HYDROCHLORIDE 2 MG/1
CAPSULE ORAL
Qty: 30 CAPSULE | Refills: 1 | Status: SHIPPED | OUTPATIENT
Start: 2020-07-09 | End: 2022-03-02 | Stop reason: SDDI

## 2020-07-09 NOTE — TELEPHONE ENCOUNTER
Patient stated she is having a lot of pain in the knees and wants to get her visco injections, which were ordered, advised she would need to speak to the surgeon seeing her on 7/21 to see if they are okay with her getting the injections so close to surgery  Patient not due for steroid as she had them 5/1  Patient asked what else she can do for pain, advised OTC meds as directed if able and ice  Patient verbalized understanding

## 2020-07-13 ENCOUNTER — PATIENT OUTREACH (OUTPATIENT)
Dept: INTERNAL MEDICINE CLINIC | Facility: CLINIC | Age: 55
End: 2020-07-13

## 2020-07-13 DIAGNOSIS — Z74.8 ASSISTANCE NEEDED WITH TRANSPORTATION: Primary | ICD-10-CM

## 2020-07-13 NOTE — PROGRESS NOTES
Outpatient Care Management Note:    Patient called office requesting to speak with me regarding transportation issues  Call was transferred to me  Patient reports called [facility] to schedule transportation for her appointments this week as she needs to get to the lab for bloodwork, have COVID testing done, and PCP appointment for clearance and reports she does not have services with [facility] and needs the doctor to fill out a form  Patient does not know how she will get to her appointments as he son has epilepsy and cannot drive, her daughter has 3 young children she needs to care for and the other person that could take her is currently sick and not feeling well  I made patient aware with her insurance she has transportation to medical appointments  I gave patient the phone number for [employer] [phone]  Together I made a 3 way call with her and set up transportation to get her to [facility] tomorrow and to PCP appointment on Thursday  Patient has phone number to call when she is ready to return home  Patient's living address is [address] [facility], [address]  I did make  Helen Nicolas aware that patient is with out [facility] services  She will further follow up with [facility] to see what is needed as her name is on release form  Will place order for   Patient does not have any further questions, concerns, or other needs at this time  Pt has my contact # and PCP office # if needed  Pt is agreeable for further outreach

## 2020-07-14 ENCOUNTER — PATIENT OUTREACH (OUTPATIENT)
Dept: INTERNAL MEDICINE CLINIC | Facility: CLINIC | Age: 55
End: 2020-07-14

## 2020-07-14 ENCOUNTER — TRANSCRIBE ORDERS (OUTPATIENT)
Dept: LAB | Facility: MEDICAL CENTER | Age: 55
End: 2020-07-14

## 2020-07-14 ENCOUNTER — LAB (OUTPATIENT)
Dept: LAB | Facility: MEDICAL CENTER | Age: 55
End: 2020-07-14
Payer: COMMERCIAL

## 2020-07-14 DIAGNOSIS — G89.4 CHRONIC PAIN SYNDROME: ICD-10-CM

## 2020-07-14 DIAGNOSIS — G89.4 CHRONIC PAIN SYNDROME: Primary | ICD-10-CM

## 2020-07-14 DIAGNOSIS — M96.1 POSTLAMINECTOMY SYNDROME, CERVICAL REGION: ICD-10-CM

## 2020-07-14 DIAGNOSIS — M96.1 POSTLAMINECTOMY SYNDROME: ICD-10-CM

## 2020-07-14 DIAGNOSIS — Z11.59 SCREENING FOR VIRAL DISEASE: ICD-10-CM

## 2020-07-14 DIAGNOSIS — E87.6 HYPOKALEMIA: ICD-10-CM

## 2020-07-14 LAB
ANION GAP SERPL CALCULATED.3IONS-SCNC: 7 MMOL/L (ref 4–13)
BUN SERPL-MCNC: 10 MG/DL (ref 5–25)
CALCIUM SERPL-MCNC: 8.9 MG/DL (ref 8.3–10.1)
CHLORIDE SERPL-SCNC: 106 MMOL/L (ref 100–108)
CO2 SERPL-SCNC: 29 MMOL/L (ref 21–32)
CREAT SERPL-MCNC: 0.7 MG/DL (ref 0.6–1.3)
GFR SERPL CREATININE-BSD FRML MDRD: 112 ML/MIN/1.73SQ M
GLUCOSE P FAST SERPL-MCNC: 98 MG/DL (ref 65–99)
POTASSIUM SERPL-SCNC: 4 MMOL/L (ref 3.5–5.3)
SODIUM SERPL-SCNC: 142 MMOL/L (ref 136–145)

## 2020-07-14 PROCEDURE — 80048 BASIC METABOLIC PNL TOTAL CA: CPT

## 2020-07-14 PROCEDURE — U0003 INFECTIOUS AGENT DETECTION BY NUCLEIC ACID (DNA OR RNA); SEVERE ACUTE RESPIRATORY SYNDROME CORONAVIRUS 2 (SARS-COV-2) (CORONAVIRUS DISEASE [COVID-19]), AMPLIFIED PROBE TECHNIQUE, MAKING USE OF HIGH THROUGHPUT TECHNOLOGIES AS DESCRIBED BY CMS-2020-01-R: HCPCS

## 2020-07-14 PROCEDURE — 36415 COLL VENOUS BLD VENIPUNCTURE: CPT

## 2020-07-14 NOTE — PROGRESS NOTES
HIRAM has spoken with Jessie Green of [facility] [phone] re pt's Arlene Almodovar Status and [facility] reports pt has lost her tempoary service as of 5/22/20 as she did not place an APPEAL or request an Extension at this time  Her OPTIONS at this time are to see if MD will write a letter and send it to them explaining if there is a negative change is her condition or if her previous [address] [place] 153 of getting PT and gong to rehab substantially improved her condition so she can use the public bus OR  has it stayed the same of worsened and she still NEEDS door to door service   (ie pt needs as wheelchair and or walker and is unsteady with her ambulation)  Call placed to pt to further discuss  HIRAM will request letter from MD to assist with same  ADDENDUM:  Pt has returned SW call and has related the she still needs door to door service because she has great pain in her back, legs and knees and hip  She reports she has swelling in her knee and ankle  She reports she gets exhausted when she is trying to ambulate  She has had OP Pt services, a SNF/rehab stay as well as another hospital admission and her condition has worsened  She still needs a Rolator Walker and at times a wheelchair to get around  She feels her feels her condition has worsened  HIRAM will request MD to assist with a letter for [facility] re: question re-instatement of her door to door services

## 2020-07-16 ENCOUNTER — PATIENT OUTREACH (OUTPATIENT)
Dept: INTERNAL MEDICINE CLINIC | Facility: CLINIC | Age: 55
End: 2020-07-16

## 2020-07-16 ENCOUNTER — CONSULT (OUTPATIENT)
Dept: INTERNAL MEDICINE CLINIC | Facility: CLINIC | Age: 55
End: 2020-07-16

## 2020-07-16 ENCOUNTER — TELEPHONE (OUTPATIENT)
Dept: OBGYN CLINIC | Facility: CLINIC | Age: 55
End: 2020-07-16

## 2020-07-16 VITALS
BODY MASS INDEX: 44.05 KG/M2 | TEMPERATURE: 97 F | SYSTOLIC BLOOD PRESSURE: 136 MMHG | OXYGEN SATURATION: 98 % | HEART RATE: 76 BPM | WEIGHT: 210.76 LBS | DIASTOLIC BLOOD PRESSURE: 100 MMHG

## 2020-07-16 DIAGNOSIS — M54.16 LUMBAR RADICULOPATHY: ICD-10-CM

## 2020-07-16 DIAGNOSIS — R21 RASH: Primary | ICD-10-CM

## 2020-07-16 PROCEDURE — 1036F TOBACCO NON-USER: CPT | Performed by: INTERNAL MEDICINE

## 2020-07-16 PROCEDURE — 3080F DIAST BP >= 90 MM HG: CPT | Performed by: INTERNAL MEDICINE

## 2020-07-16 PROCEDURE — 99213 OFFICE O/P EST LOW 20 MIN: CPT | Performed by: INTERNAL MEDICINE

## 2020-07-16 PROCEDURE — 3075F SYST BP GE 130 - 139MM HG: CPT | Performed by: INTERNAL MEDICINE

## 2020-07-16 RX ORDER — LIDOCAINE 40 MG/G
CREAM TOPICAL AS NEEDED
Qty: 30 G | Refills: 0 | Status: ON HOLD | OUTPATIENT
Start: 2020-07-16 | End: 2021-07-14 | Stop reason: CLARIF

## 2020-07-16 RX ORDER — TRIAMCINOLONE ACETONIDE 0.25 MG/G
CREAM TOPICAL 2 TIMES DAILY
Qty: 30 G | Refills: 0 | Status: SHIPPED | OUTPATIENT
Start: 2020-07-16 | End: 2021-07-07 | Stop reason: HOSPADM

## 2020-07-16 NOTE — PROGRESS NOTES
SW has met with pt this date alsong with Griselda Galan RN/CM to inform her her request to have her [employer]ovanny Juarez services extended and her Garima Haider has been e-mailed in this date  SW will check back on Monday to see if approved  Pt does feel her [employer] rides are easier to manage due to her pain  Pt was reminded she gets only 24 /year  Pt does c/o of great pain which she hopes her upcoming spine stimulator may help eliviate  Pt relates he dgt hep with bathing and meals  Pt did come alone to her PCP visit today and it appeared difficult for her to manage  Sw has asked if she would like to be evaluated for the PA Waiver Program as a way to get regular on going help at home  Pt declines same as she does not want anyone coming to the home as it is her dgt's house  Pt encouraged to f/u with SW if needed

## 2020-07-16 NOTE — TELEPHONE ENCOUNTER
Called patient to set up apt for her Leonie Covarrubias has a schedule the week Dr Jones Sat is out      B/L KNEE SYNVISC #1,2,3 DNB/PHARMACY

## 2020-07-16 NOTE — PROGRESS NOTES
[facility] Visit Note  Samantha Yanez 62 y o  female   MRN: [id number]    Assessment and Plan      Diagnoses and all orders for this visit:    1  Rash - Noted to have a rash located on her back, small psutules with no evidence of actual cellutlitis, itching reported with excoriations  Surgery was 2 years ago  Will try higher dose steroid  -     triamcinolone (KENALOG) 0 025 % cream; Apply topically 2 (two) times a day    2  Lumbar radiculopathy with pre surgery clearance for spinal cord stimulator  She is low risk for low risk surgery, 4 mets on history, only short of breath secondary to pain, no chest pain on exertion, no smoking  Counseled to cut back on opiates and she absolutely refuses  -     diclofenac sodium (VOLTAREN) 1 %; Apply 4 g topically 4 (four) times a day            [facility] - does not want to do  Any of this right now  CRC: due, ordered but not obtained  HIV: Due  Cervical cancer: Due  Mammogram: Due, has been ordered  Hep C: negative  ASCVD: 5 2%  A1C negative 2020  Schedule a follow-up appointment in  5weeks    Chief Complaint: lumbar pain  Pre surgery clearance  Subjective     History of Present Illness:  62 yaer old female PMH of MIMI, overactive bladder, lumbar fusion surgery, PTSD, opiate dependence, depression, Bipolar disorder, tardive dyskinesia  The patient comes in today for pre op clearance  Due to her pain she is severely limited where she can go around the house, but due to pain she is having trouble getting around the house  She reports that her surgery is next Tuesday  She states she can walk about 100 feet before getting short of breath, she does get some shortness of breath because of the pain, but not related to exertion  She comes in today for complaints of pain  She has a long time history of pain  She reports she can't stand or walk because of it   She states her house has 15 stairs inside, and 5 to get inside  She states she is seeing neurosurgery for her lumbar pain, which is her biggest concern  She reports that her lumbar fuision was 2 years ago that she originally did because of scoliosis  She had a trial with the pain stimulator in [place]  She does report approximately 3 bowel movements a week  She also reports a rash on lower back for a couple weeks  She states it is itchy  No new soaps or clothes  She has been scratching it excessively  She has not tried anything for attempted alleviation for it  She put nystatin on it but did not help  She states the heat does make it worse  No fevers  She does report noticing some pus coming out of it  No new soaps no new shampoos and no associated new clothes  Review of Systems   Constitutional: Negative for activity change  HENT: Negative for congestion  Eyes: Negative for discharge  Respiratory: Negative for apnea  Cardiovascular: Negative for chest pain  Gastrointestinal: Negative for abdominal distention  Endocrine: Negative for cold intolerance  Genitourinary: Negative for difficulty urinating  Musculoskeletal: Positive for arthralgias and back pain  Negative for joint swelling  Skin: Negative for rash  Allergic/Immunologic: Negative for environmental allergies  Neurological: Negative for dizziness  Hematological: Negative for adenopathy  Psychiatric/Behavioral: Negative for agitation           Current Outpatient Medications:     amLODIPine (NORVASC) 5 mg tablet, Take 1 tablet (5 mg total) by mouth daily, Disp: 90 tablet, Rfl: 3    busPIRone (BUSPAR) 5 mg tablet, TAKE 1 TABLET (5 MG TOTAL) BY MOUTH 3 (THREE) TIMES A DAY, Disp: 90 tablet, Rfl: 0    cyclobenzaprine (FLEXERIL) 5 mg tablet, Take 1 tablet (5 mg total) by mouth 3 (three) times a day as needed for muscle spasms, Disp: 30 tablet, Rfl: 1    DULoxetine (CYMBALTA) 30 mg delayed release capsule, Take 30 mg by mouth daily, Disp: , Rfl:     DULoxetine (CYMBALTA) 60 mg delayed release capsule, Take 1 capsule (60 mg total) by mouth daily, Disp: 90 capsule, Rfl: 0    hydrOXYzine HCL (ATARAX) 50 mg tablet, Take 1 tablet (50 mg total) by mouth daily at bedtime as needed for anxiety (insomnia), Disp: 90 tablet, Rfl: 2    LORazepam (ATIVAN) 0 5 mg tablet, Take 0 5 mg by mouth every 8 (eight) hours as needed, Disp: , Rfl:     morphine (MS CONTIN) 15 mg 12 hr tablet, Take 1 tablet (15 mg total) by mouth 2 (two) times a dayMax Daily Amount: 30 mg, Disp: 60 tablet, Rfl: 0    omeprazole (PriLOSEC) 20 mg delayed release capsule, Take 1 capsule (20 mg total) by mouth daily, Disp: 90 capsule, Rfl: 3    oxybutynin (DITROPAN) 5 mg tablet, Take 1 tablet (5 mg total) by mouth 2 (two) times a day, Disp: 180 tablet, Rfl: 3    prazosin (MINIPRESS) 2 mg capsule, TAKE 1 CAPSULE BY MOUTH EVERY DAY, Disp: 30 capsule, Rfl: 1    pregabalin (LYRICA) 150 mg capsule, Take 1 capsule (150 mg total) by mouth 3 (three) times a day, Disp: 90 capsule, Rfl: 2    propranolol (INDERAL) 20 mg tablet, Take 1 tablet (20 mg total) by mouth 2 (two) times a day, Disp: 180 tablet, Rfl: 3    QUEtiapine (SEROquel XR) 300 mg 24 hr tablet, Take 1 tablet (300 mg total) by mouth daily at bedtime, Disp: 60 tablet, Rfl: 1    traZODone (DESYREL) 150 mg tablet, Take 1 tablet (150 mg total) by mouth daily at bedtime, Disp: 30 tablet, Rfl: 1    Valbenazine Tosylate (Ingrezza) 80 MG CAPS, Take 80 mg by mouth daily, Disp: , Rfl:     acetaminophen (TYLENOL) 500 mg tablet, Take 1 tablet (500 mg total) by mouth every 6 (six) hours as needed for mild pain (Patient not taking: Reported on 7/6/2020), Disp: 90 tablet, Rfl: 3    ammonium lactate (LAC-HYDRIN) 12 % cream, Apply topically 2 (two) times a day Apply to abdomen, chest, and back (Patient not taking: Reported on 7/16/2020), Disp: 140 g, Rfl: 3    aspirin 81 MG tablet, Take 1 tablet (81 mg total) by mouth daily (Patient not taking: Reported on 7/16/2020), Disp: 90 tablet, Rfl: 3    bisacodyl (DULCOLAX) 5 mg EC tablet, Take 2 tablets (10 mg total) by mouth daily (Patient not taking: Reported on 7/6/2020), Disp: 30 tablet, Rfl: 0    cholecalciferol (VITAMIN D3) 1,000 units tablet, Take 1 tablet (1,000 Units total) by mouth daily (Patient not taking: Reported on 7/6/2020), Disp: 30 tablet, Rfl: 1    diclofenac sodium (VOLTAREN) 1 %, Apply 4 g topically 4 (four) times a day (Patient not taking: Reported on 7/6/2020), Disp: 30 Tube, Rfl: 0    hydrocortisone 1 % cream, Apply topically 4 (four) times a day as needed for rash On abdomen, arms, and chest apply to clean and dry area , Disp: 45 g, Rfl: 1    lactulose 20 g/30 mL, Take 30 mL (20 g total) by mouth daily (Patient not taking: Reported on 7/6/2020), Disp: 900 mL, Rfl: 2    lidocaine (LIDODERM) 5 %, Apply 1 patch topically daily at bedtime Remove & Discard patch within 12 hours or as directed by MD (Patient not taking: Reported on 7/6/2020), Disp: 30 patch, Rfl: 0    loratadine (CLARITIN) 10 mg tablet, Take 1 tablet (10 mg total) by mouth daily (Patient not taking: Reported on 7/16/2020), Disp: 30 tablet, Rfl: 1    melatonin 3 mg, Take 2 tablets (6 mg total) by mouth daily at bedtime as needed (insomnia) (Patient not taking: Reported on 7/6/2020), Disp: 60 tablet, Rfl: 3    meloxicam (MOBIC) 15 mg tablet, Take 1 tablet (15 mg total) by mouth daily as needed for moderate pain (Patient not taking: Reported on 7/16/2020), Disp: 30 tablet, Rfl: 2    nystatin (MYCOSTATIN) ointment, Apply topically 2 (two) times a day, Disp: 30 g, Rfl: 3    potassium chloride (K-DUR,KLOR-CON) 20 mEq tablet, Take 1 tablet (20 mEq total) by mouth daily for 3 days (Patient not taking: Reported on 7/16/2020), Disp: 3 tablet, Rfl: 0    senna-docusate sodium (SENOKOT-S) 8 6-50 mg per tablet, Take 1 tablet by mouth daily, Disp: , Rfl:   Past Medical History:   Diagnosis Date    Acid reflux     Anxiety     RESOLVED: [id number]    Arthritis     Bipolar 2 disorder (HCC)     FOLLOWS WITH PSYCHIATRIST  CONTINUE LAMOTRIGINE; RESOLVED: [id number]    Depression     Familial tremor     both hands    Fibromyalgia     LAST ASSESSED: [id number]    Hearing aid worn     left ear    [place] (hard of hearing)     left ear    Hypertension     Left-sided weakness     Lower back pain     Memory loss of unknown cause     long and short term    Migraine     Overactive bladder     Panic attack     Post traumatic stress disorder     Seasonal allergies     Stroke [facility])     questionable stroke 2009    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    CONT ASA 81 MG DAILY; RESOLVED: [id number]    Urinary incontinence     Wears dentures     partial lower / full upper    Wears glasses      Past Surgical History:   Procedure Laterality Date    BACK SURGERY       SECTION      COLONOSCOPY      RESOLVED: [id number]    EAR SURGERY      HYSTERECTOMY  2004    MYRINGOTOMY W/ TUBES Left     NECK SURGERY  2019    MI CYSTOURETHROSCOPY N/A 2016    Procedure: CYSTOSCOPY, BOTOX INJECTION;  Surgeon: Rich Serna MD;  Location: [facility];  Service: Gynecology    TONSILLECTOMY      TUBAL LIGATION       Social History     Socioeconomic History    Marital status:      Spouse name: Not on file    Number of children: 2    Years of education: graduate school     Highest education level: Not on file   Occupational History    Occupation: Disabled   Social Needs    Financial resource strain: Somewhat hard    Food insecurity:     Worry: Not on file     Inability: Not on file   Filtr8 needs:     Medical: Not on file     Non-medical: Not on file   Tobacco Use    Smoking status: Never Smoker    Smokeless tobacco: Never Used   Substance and Sexual Activity    Alcohol use: Not Currently     Comment: 2 x year; being a social drinker as per all scripts     Drug use: No    Sexual activity: Not on file   Lifestyle    Physical activity:     Days per week: Not on file     Minutes per session: Not on file    Stress: Not on file   Relationships    Social connections:     Talks on phone: Not on file     Gets together: Not on file     Attends [employer] service: Not on file     Active member of club or organization: Not on file     Attends meetings of clubs or organizations: Not on file     Relationship status: Not on file    Intimate partner violence:     Fear of current or ex partner: Not on file     Emotionally abused: Not on file     Physically abused: Not on file     Forced sexual activity: Not on file   Other Topics Concern    Not on file   Social History Narrative    Bereavement    Daily caffeine consumption, 6-8 servings per day     as per all scripts    Lives in [place]      Family History   Problem Relation Age of Onset    Colon cancer Mother     Alzheimer's disease Father     Stroke Father     Colon cancer Brother     Bipolar disorder Brother     Breast cancer Maternal Aunt     Colon cancer Maternal Aunt     Heart disease Other     Diabetes Other     Hypertension Other     Seizures Son     Depression Paternal Grandfather     No Known Problems Sister     No Known Problems Brother     Thyroid disease Neg Hx      No Known Allergies    Objective     Vitals:    07/16/20 0829   BP: 136/100   BP Location: Left arm   Patient Position: Sitting   Cuff Size: Adult   Pulse: 76   Temp: (!) 97 °F (36 1 °C)   TempSrc: Temporal   SpO2: 98%   Weight: 95 6 kg (210 lb 12 2 oz)       Physical exam:     GENERAL:tearful  HEENT:  NC/AT,   CARDIAC:  RRR,   PULMONARY:  CTA B/L,   ABDOMEN:  Soft, NT/ND,no rebound/guarding/rigidity  Extremities:  No edema, cyanosis, or clubbing  NEUROLOGIC: Grossly intact  SKIN:  Small pustular rash noted along previous incision site, no pus noted discharging, no hives     Psych: tearful          ==  PLEASE NOTE:  This encounter was completed utilizing the M- Modal/Fluency Direct Speech Voice Recognition Software  Grammatical errors, random word insertions, pronoun errors and incomplete sentences are occasional consequences of the system due to software limitations, ambient noise and hardware issues  These may be missed by proof reading prior to affixing electronic signature  Any questions or concerns about the content, text or information contained within the body of this dictation should be directly addressed to the physician for clarification  Please do not hesitate to call me directly if you have any any questions or concerns

## 2020-07-16 NOTE — PROGRESS NOTES
Outpatient Care Management Note:    Patient at primary care doctor's office today for pre-op clearance for spinal cord stimulator  I met with patient along with Teo Mary   Patient was made aware letter for [facility] extension letter was written by provider and faxed to Jovanna Thornton   will further follow up next week regarding status of this  Patient reports the transportation with her insurance worked out well today and the other day  She has the contact number if she needs to continue to use this service  Patient using rollator today to ambulate  Patient reports she is managing with activities or daily living and reports she is able to get herself dressed and bathed but sometimes needs help with getting her legs in the tub  She reports has shower chair  She has steps in the home and reports needs assistance to do the steps  Patient lives with her daughter and her 4 grandchildren  Daughter helps with cooking  Patient was offered PA waiver program to have ongoing help in the home and about declined and does not want anyone coming into her daughter's home  Patient feels she is managing ok with her medication  I did encourage her to get a pill box and set it up once a week to make it easier to manage her medication  Patient does complain of the pain she is in with her back and legs  Patient is hoping the spinal cord stimulator will help with her pain  I encouraged her to follow up with Neurosurgery and Pain management afterwards  Patient does not have any further questions, concerns, or other needs at this time  Pt has my contact # and PCP office # if needed  Pt is agreeable for further outreach

## 2020-07-17 ENCOUNTER — TELEPHONE (OUTPATIENT)
Dept: NEUROSURGERY | Facility: CLINIC | Age: 55
End: 2020-07-17

## 2020-07-17 NOTE — TELEPHONE ENCOUNTER
Attempted to reach patient by telephone at provider request, no answer/unable to leave message    Sent letter (in chart) to email on file, requesting patient to contact our office and come in for visit to assess skin, visit needed prior to proceeding with surgery

## 2020-07-17 NOTE — TELEPHONE ENCOUNTER
NOTIFIED BY SURGERY SCHEDULER  PCP clearance note in regards to Rash, there sis no picture  1  Rash - Noted to have a rash located on her back, small psutules with no evidence of actual cellutlitis, itching reported with excoriations  Surgery was 2 years ago  Will try higher dose steroid  -     triamcinolone (KENALOG) 0 025 % cream; Apply topically 2 (two) times a day     Suggest we contact patient for office visit today if possible , should not proceed with surgery until we can visually assess skin integrity alteration and verify infection that  may pose a risk for infection  She is scheduled for surgery 7/21/20  INSERTION THORACIC DORSAL COLUMN SPINAL CORD STIMULATOR PERCUTANEOUS W IMPLANTABLE PULSE GENERATOR, RIGHT (Right Spine Thoracic)          2  Lumbar radiculopathy with pre surgery clearance for spinal cord stimulator  She is low risk for low risk surgery, 4 mets on history, only short of breath secondary to pain, no chest pain on exertion, no smoking  Counseled to cut back on opiates and she absolutely refuses  -     diclofenac sodium (VOLTAREN) 1 %;  Apply 4 g topically 4 (four) times a day

## 2020-07-17 NOTE — TELEPHONE ENCOUNTER
Patient returned call through service scheduled appointment for 0800 Monday to assess cellulitis of buttocks , need to determine if can proceed with surgery  Planned for sx 7/21  Insertion thoracic spinal cord stimulator

## 2020-07-20 ENCOUNTER — OFFICE VISIT (OUTPATIENT)
Dept: NEUROSURGERY | Facility: CLINIC | Age: 55
End: 2020-07-20

## 2020-07-20 VITALS
HEART RATE: 68 BPM | HEIGHT: 58 IN | DIASTOLIC BLOOD PRESSURE: 65 MMHG | TEMPERATURE: 98.2 F | WEIGHT: 210 LBS | SYSTOLIC BLOOD PRESSURE: 90 MMHG | BODY MASS INDEX: 44.08 KG/M2 | RESPIRATION RATE: 16 BRPM

## 2020-07-20 DIAGNOSIS — R23.9 IMPAIRED SKIN INTEGRITY ASSOCIATED WITH SURGICAL INCISION: Primary | ICD-10-CM

## 2020-07-20 DIAGNOSIS — B96.89 SUPERFICIAL BACTERIAL SKIN INFECTION: ICD-10-CM

## 2020-07-20 DIAGNOSIS — T81.89XA IMPAIRED SKIN INTEGRITY ASSOCIATED WITH SURGICAL INCISION: Primary | ICD-10-CM

## 2020-07-20 DIAGNOSIS — R21 RASH: ICD-10-CM

## 2020-07-20 DIAGNOSIS — G89.4 CHRONIC PAIN SYNDROME: ICD-10-CM

## 2020-07-20 DIAGNOSIS — L08.9 SUPERFICIAL BACTERIAL SKIN INFECTION: ICD-10-CM

## 2020-07-20 PROBLEM — L90.5 SCAR OF BACK: Status: ACTIVE | Noted: 2020-07-20

## 2020-07-20 PROCEDURE — 3074F SYST BP LT 130 MM HG: CPT | Performed by: NURSE PRACTITIONER

## 2020-07-20 PROCEDURE — PREOP: Performed by: NURSE PRACTITIONER

## 2020-07-20 PROCEDURE — 3008F BODY MASS INDEX DOCD: CPT | Performed by: NURSE PRACTITIONER

## 2020-07-20 PROCEDURE — 3078F DIAST BP <80 MM HG: CPT | Performed by: NURSE PRACTITIONER

## 2020-07-20 RX ORDER — DOXYCYCLINE HYCLATE 100 MG/1
100 CAPSULE ORAL EVERY 12 HOURS SCHEDULED
Qty: 14 CAPSULE | Refills: 0 | Status: SHIPPED | OUTPATIENT
Start: 2020-07-20 | End: 2020-07-28

## 2020-07-20 NOTE — ASSESSMENT & PLAN NOTE
· Old healed scar thoracic lumbar incision with a moist open area, reports she picked the scab off area, has a habit of picking scabs off  · Refer to photo attachment

## 2020-07-20 NOTE — ASSESSMENT & PLAN NOTE
· As detailed in HPI  · As detailed in scar on back   · As detailed in superficial bacterial infection skin

## 2020-07-20 NOTE — ASSESSMENT & PLAN NOTE
· As detailed in HPI  · Dx by PCP with rash located in medial buttocks area and  Intergluteal cleft treating with high dose Kenalog bid started 7/16 assessed today , skin remains excoriated  And she continues complaint of pruritis, areas of where she has scratched skin  · Old healed scar thoracic lumbar incision with a moist open area, reports she picked the scab off area  · Refer to photos of both areas  · Concern for proceeding with surgery as detailed in HPI in the presence of skin integrity alterations  ·   · In discussion of skin integrity hygiene pre-op her remarks do not support and understanding of preoperative hygiene care, did not start showers on Saturday for surgery on Tuesday  Plan:  · Cancel surgery for 7/21  · Start today Doxycycline x 7 days   · RTO in one week for reassess of skin integrity alterations, if resolved will reschedule surgery  · Explained in great detail again preoperative hygiene showers and use of BRIAN wipes, will restart after next weeks appointment and surgery date rescheduled  · Explained need to use Kenolof ointment as prescribed BID  Reports the tube is very small   Advised to contact PCP for refill

## 2020-07-20 NOTE — PROGRESS NOTES
Assessment/Plan:    Chronic pain syndrome  As detailed in HPI  Surgery planned for 7/21 , cancel secondary to skin integrity alteration  Superficial bacterial infection of skin  · As detailed in HPI  · Dx by PCP with rash located in medial buttocks area and  Intergluteal cleft treating with high dose Kenalog bid started 7/16 assessed today , skin remains excoriated  And she continues complaint of pruritis, areas of where she has scratched skin  · Old healed scar thoracic lumbar incision with a moist open area, reports she picked the scab off area  · Refer to photos of both areas  · Concern for proceeding with surgery as detailed in HPI in the presence of skin integrity alterations  ·   · In discussion of skin integrity hygiene pre-op her remarks do not support and understanding of preoperative hygiene care, did not start showers on Saturday for surgery on Tuesday  Plan:  · Cancel surgery for 7/21  · Start today Doxycycline x 7 days   · RTO in one week for reassess of skin integrity alterations, if resolved will reschedule surgery  · Explained in great detail again preoperative hygiene showers and use of BRIAN wipes, will restart after next weeks appointment and surgery date rescheduled  · Explained need to use Kenolof ointment as prescribed BID  Reports the tube is very small  Advised to contact PCP for refill            Scar of back  · Old healed scar thoracic lumbar incision with a moist open area, reports she picked the scab off area, has a habit of picking scabs off  · Refer to photo attachment  Impaired skin integrity associated with surgical incision  · As detailed in HPI  · As detailed in scar on back   · As detailed in superficial bacterial infection skin        There are no diagnoses linked to this encounter      Subjective:     Patient ID: Leatha Roberts is a 62 y o  female     HPI   She has a longstanding history of chronic pain syndrome affecting her lower back and both lower extremities   She is status post lumbar surgery, with resultant post laminectomy syndrome from a thoracici lumbar  fusion  She has failed  conservative treatment   She underwent a Medtronic trial spinal cord stimulator insertion (while living in [place])  , reported achieving 50 % efficacy  She was referred to Dr Han Newberry by colleague Dr Miles Fernando for assessment of appropriateness to insert a spinal cord stimulator or IT pain pump  After assessment and evaluation is deemed and appropriate candidate to proceed with insertion of Thoracic Spinal Cord Stimulator  She is scheduled fr surgery 7/21 2020 w/ DR Han Newberry for INSERTION THORACIC DORSAL COLUMN SPINAL CORD STIMULATOR PERCUTANEOUS W IMPLANTABLE PULSE GENERATOR, RIGHT (Right Spine Thoracic)    PCP surgery clearance appointment noted as per note excerpt   Rash - Noted to have a rash located on her back, small psutules with no evidence of actual cellutlitis, itching reported with excoriations  Surgery was 2 years ago  Will try higher dose steroid  -triamcinolone (KENALOG) 0 025 % cream; Apply topically 2 (two) times a day    Scheduled appointment today 7/20 to assess status of skin before proceeding with scheduled surgery     I have spent 20 minutes with patient today in which greater than 50% of this time was spent in assessment, examination, impressions, reviewing recommendations for care  All questions were answered to patients satisfaction and understanding, contact information provided in the event additional questions arise  Patient acknowledged an understanding and agreement with plan  REVIEW OF SYSTEMS  Review of Systems   Constitutional: Positive for chills (when pain increases ) and fatigue  HENT: Negative  Eyes: Negative  Respiratory: Negative  Cardiovascular: Negative  Gastrointestinal: Positive for constipation  Endocrine: Negative  Genitourinary: Positive for frequency     Musculoskeletal: Positive for back pain (up/down spine, across lower back radiates into bilateral hips, bilateral buttock, and down bilateral legs/knees down into feet), gait problem and joint swelling  Uses walker for assistance   Standing up to walk is getting harder  Leg pain is making it harder to walk  Skin: Negative  Allergic/Immunologic: Negative  Neurological: Positive for weakness (bilateral legs ), numbness (bilateral legs numbness and tingling ) and headaches (migraines )  Hematological: Bruises/bleeds easily (patient on ASA 81)  Psychiatric/Behavioral: Positive for sleep disturbance (due to pain )  All other systems reviewed and are negative          Meds/Allergies     Current Outpatient Medications   Medication Sig Dispense Refill    busPIRone (BUSPAR) 5 mg tablet TAKE 1 TABLET (5 MG TOTAL) BY MOUTH 3 (THREE) TIMES A DAY 90 tablet 0    DULoxetine (CYMBALTA) 30 mg delayed release capsule Take 30 mg by mouth daily      DULoxetine (CYMBALTA) 60 mg delayed release capsule Take 1 capsule (60 mg total) by mouth daily 90 capsule 0    hydrOXYzine HCL (ATARAX) 50 mg tablet Take 1 tablet (50 mg total) by mouth daily at bedtime as needed for anxiety (insomnia) 90 tablet 2    lidocaine (LMX) 4 % cream Apply topically as needed for mild pain 30 g 0    LORazepam (ATIVAN) 0 5 mg tablet Take 0 5 mg by mouth every 8 (eight) hours as needed      morphine (MS CONTIN) 15 mg 12 hr tablet Take 1 tablet (15 mg total) by mouth 2 (two) times a dayMax Daily Amount: 30 mg 60 tablet 0    prazosin (MINIPRESS) 2 mg capsule TAKE 1 CAPSULE BY MOUTH EVERY DAY 30 capsule 1    pregabalin (LYRICA) 150 mg capsule Take 1 capsule (150 mg total) by mouth 3 (three) times a day 90 capsule 2    QUEtiapine (SEROquel XR) 300 mg 24 hr tablet Take 1 tablet (300 mg total) by mouth daily at bedtime 60 tablet 1    triamcinolone (KENALOG) 0 025 % cream Apply topically 2 (two) times a day 30 g 0    Valbenazine Tosylate (Ingrezza) 80 MG CAPS Take 80 mg by mouth daily      amLODIPine (NORVASC) 5 mg tablet Take 1 tablet (5 mg total) by mouth daily 90 tablet 3    diclofenac sodium (VOLTAREN) 1 % Apply 4 g topically 4 (four) times a day (Patient not taking: Reported on 2020) 30 Tube 0    doxycycline hyclate (VIBRAMYCIN) 100 mg capsule Take 1 capsule (100 mg total) by mouth every 12 (twelve) hours for 7 days 14 capsule 0    omeprazole (PriLOSEC) 20 mg delayed release capsule Take 1 capsule (20 mg total) by mouth daily 90 capsule 3    oxybutynin (DITROPAN) 5 mg tablet Take 1 tablet (5 mg total) by mouth 2 (two) times a day 180 tablet 3    propranolol (INDERAL) 20 mg tablet Take 1 tablet (20 mg total) by mouth 2 (two) times a day 180 tablet 3    traZODone (DESYREL) 150 mg tablet Take 1 tablet (150 mg total) by mouth daily at bedtime 30 tablet 1     No current facility-administered medications for this visit  No Known Allergies    PAST HISTORY    Past Medical History:   Diagnosis Date    Acid reflux     Anxiety     RESOLVED: [id number]    Arthritis     Bipolar 2 disorder (HCC)     FOLLOWS WITH PSYCHIATRIST  CONTINUE LAMOTRIGINE; RESOLVED: [id number]    Depression     Familial tremor     both hands    Fibromyalgia     LAST ASSESSED: [id number]    Hearing aid worn     left ear    [place]hard of hearing[place]     left ear    Hypertension     Left-sided weakness     Lower back pain     Memory loss of unknown cause     long and short term    Migraine     Overactive bladder     Panic attack     Post traumatic stress disorder     Seasonal allergies     Stroke [facility])     questionable stroke 2009    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    CONT ASA 81 MG DAILY; RESOLVED: [id number]    Urinary incontinence     Wears dentures     partial lower / full upper    Wears glasses        Past Surgical History:   Procedure Laterality Date    BACK SURGERY       SECTION      COLONOSCOPY      RESOLVED: [id number]    EAR SURGERY      HYSTERECTOMY 2004    MYRINGOTOMY W/ TUBES Left     NECK SURGERY  04/2019    [place] CYSTOURETHROSCOPY N/A 2/18/2016    Procedure: CYSTOSCOPY, BOTOX INJECTION;  Surgeon: Saud Clark MD;  [facility];  Service: Gynecology    TONSILLECTOMY      TUBAL LIGATION  1986       Social History     Tobacco Use    Smoking status: Never Smoker    Smokeless tobacco: Never Used   Substance Use Topics    Alcohol use: Not Currently     Comment: 2 x year; being a social drinker as per all scripts     Drug use: No       Family History   Problem Relation Age of Onset    Colon cancer Mother     Alzheimer's disease Father     Stroke Father     Colon cancer Brother     Bipolar disorder Brother     Breast cancer Maternal Aunt     Colon cancer Maternal Aunt     Heart disease Other     Diabetes Other     Hypertension Other     Seizures Son     Depression Paternal Grandfather     No Known Problems Sister     No Known Problems Brother     Thyroid disease Neg Hx        The following portions of the patient's history were reviewed and updated as appropriate: allergies, current medications, past family history, past medical history, past social history, past surgical history and problem list       EXAM    Vitals:Blood pressure 90/65, pulse 68, temperature 98 2 °F (36 8 °C), temperature source Probe, resp  rate 16, height 4' 10" (1 473 m), weight 95 3 kg (210 lb), not currently breastfeeding  ,Body mass index is 43 89 kg/m²  Physical Exam   Constitutional: She is oriented to person, place, and time  No distress  HENT:   Head: Normocephalic and atraumatic  Eyes: Right eye exhibits no discharge  Left eye exhibits no discharge  No scleral icterus  Cardiovascular: Normal rate and regular rhythm  Pulmonary/Chest: Effort normal  No respiratory distress  Musculoskeletal: She exhibits deformity  She exhibits no edema or tenderness  Ambulates with a stumped forward posture using a seated       Neurological: She is alert and oriented to person, place, and time  No cranial nerve deficit  Coordination normal    Skin: Skin is warm and dry  Rash (medial buttocks and intergluteal cleft) noted  She is not diaphoretic  Neurologic Exam     Mental Status   Oriented to person, place, and time  Imaging Studies  No results found

## 2020-07-21 ENCOUNTER — PATIENT OUTREACH (OUTPATIENT)
Dept: INTERNAL MEDICINE CLINIC | Facility: CLINIC | Age: 55
End: 2020-07-21

## 2020-07-21 NOTE — TELEPHONE ENCOUNTER
Spoke to patient, advised that we can do the injections whenever she is ready, as long as the surgeon is okay with that  She stated she would check with the surgeon and let us know if she can do them sooner

## 2020-07-21 NOTE — PROGRESS NOTES
CHW called Etta to inquire about the patients [facility] status  The patient at this time is   Patients information was not submitted in a timely manner  Patient had 60 days to appeal and did not do it in the proper time frame  Although the patient had a letter from the Doctors office she will not be accepted for services and must reapply

## 2020-07-21 NOTE — TELEPHONE ENCOUNTER
Patient is scheduled for Visco injections these are not cortisone injections  Her surgeon should be aware that these are not corticosteroid injections and if they are okay with hyaluronic acid injections which should not affect her wound healing or any other perioperative implications then we are okay with her starting these injections whenever they are ready    She will have to discuss this with the surgeon but from our standpoint it is okay to proceed at any time with Visco

## 2020-07-23 LAB — SARS-COV-2 RNA SPEC QL NAA+PROBE: NOT DETECTED

## 2020-07-24 ENCOUNTER — PATIENT OUTREACH (OUTPATIENT)
Dept: INTERNAL MEDICINE CLINIC | Facility: CLINIC | Age: 55
End: 2020-07-24

## 2020-07-24 ENCOUNTER — TELEPHONE (OUTPATIENT)
Dept: INTERNAL MEDICINE CLINIC | Facility: CLINIC | Age: 55
End: 2020-07-24

## 2020-07-24 ENCOUNTER — TELEPHONE (OUTPATIENT)
Dept: NEUROSURGERY | Facility: CLINIC | Age: 55
End: 2020-07-24

## 2020-07-24 DIAGNOSIS — M54.16 LUMBAR RADICULOPATHY: ICD-10-CM

## 2020-07-24 DIAGNOSIS — G89.4 CHRONIC PAIN DISORDER: ICD-10-CM

## 2020-07-24 RX ORDER — PREGABALIN 150 MG/1
150 CAPSULE ORAL 3 TIMES DAILY
Qty: 90 CAPSULE | Refills: 2 | OUTPATIENT
Start: 2020-07-24

## 2020-07-24 RX ORDER — MORPHINE SULFATE 15 MG/1
15 TABLET, FILM COATED, EXTENDED RELEASE ORAL 2 TIMES DAILY
Qty: 60 TABLET | Refills: 0 | Status: CANCELLED | OUTPATIENT
Start: 2020-07-24

## 2020-07-24 NOTE — TELEPHONE ENCOUNTER
Name of medication, dose, quantity and frequency:      Requested Prescriptions     Pending Prescriptions Disp Refills    morphine (MS CONTIN) 15 mg 12 hr tablet 60 tablet 0     Sig: Take 1 tablet (15 mg total) by mouth 2 (two) times a dayMax Daily Amount: 30 mg       Number of refills left: 0    Amount of medication left:    Pharmacy verified and updated: yes    Additional information:        Patient called in requesting refills

## 2020-07-24 NOTE — TELEPHONE ENCOUNTER
Name of medication, dose, quantity and frequency  Requested Prescriptions     Pending Prescriptions Disp Refills    morphine (MS CONTIN) 15 mg 12 hr tablet 60 tablet 0     Sig: Take 1 tablet (15 mg total) by mouth 2 (two) times a dayMax Daily Amount: 30 mg       Number of refills left: 0    Amount of medication left: 0    Pharmacy verified and updated    Additional information:

## 2020-07-24 NOTE — TELEPHONE ENCOUNTER
DUE 7/20/2020  PDMP checked    No attending in the office to send until [facility]  7/27/2020

## 2020-07-24 NOTE — PROGRESS NOTES
SW spoke to pt to inform her the that unfortunately the MD letter did not get her [facility] approved  Pt needs to re-apply for [facility]  SW will ask CHW to help pt with same

## 2020-07-27 ENCOUNTER — OFFICE VISIT (OUTPATIENT)
Dept: NEUROSURGERY | Facility: CLINIC | Age: 55
End: 2020-07-27
Payer: COMMERCIAL

## 2020-07-27 ENCOUNTER — TELEPHONE (OUTPATIENT)
Dept: NEUROSURGERY | Facility: CLINIC | Age: 55
End: 2020-07-27

## 2020-07-27 VITALS
RESPIRATION RATE: 16 BRPM | BODY MASS INDEX: 42.4 KG/M2 | WEIGHT: 202 LBS | HEART RATE: 62 BPM | HEIGHT: 58 IN | DIASTOLIC BLOOD PRESSURE: 87 MMHG | TEMPERATURE: 98 F | SYSTOLIC BLOOD PRESSURE: 124 MMHG

## 2020-07-27 DIAGNOSIS — Z98.890 POSTOPERATIVE STATE: ICD-10-CM

## 2020-07-27 DIAGNOSIS — L08.9 SUPERFICIAL BACTERIAL INFECTION OF SKIN: ICD-10-CM

## 2020-07-27 DIAGNOSIS — G89.4 CHRONIC PAIN SYNDROME: ICD-10-CM

## 2020-07-27 DIAGNOSIS — M54.16 LUMBAR RADICULOPATHY: ICD-10-CM

## 2020-07-27 DIAGNOSIS — G89.18 POSTOPERATIVE PAIN: ICD-10-CM

## 2020-07-27 DIAGNOSIS — G89.4 CHRONIC PAIN DISORDER: ICD-10-CM

## 2020-07-27 DIAGNOSIS — G89.18 POSTOPERATIVE PAIN: Primary | ICD-10-CM

## 2020-07-27 DIAGNOSIS — R23.9 IMPAIRED SKIN INTEGRITY ASSOCIATED WITH SURGICAL INCISION: ICD-10-CM

## 2020-07-27 DIAGNOSIS — B96.89 SUPERFICIAL BACTERIAL SKIN INFECTION: Primary | ICD-10-CM

## 2020-07-27 DIAGNOSIS — B96.89 SUPERFICIAL BACTERIAL INFECTION OF SKIN: ICD-10-CM

## 2020-07-27 DIAGNOSIS — L08.9 SUPERFICIAL BACTERIAL SKIN INFECTION: Primary | ICD-10-CM

## 2020-07-27 DIAGNOSIS — T81.89XA IMPAIRED SKIN INTEGRITY ASSOCIATED WITH SURGICAL INCISION: ICD-10-CM

## 2020-07-27 PROCEDURE — 3079F DIAST BP 80-89 MM HG: CPT | Performed by: NURSE PRACTITIONER

## 2020-07-27 PROCEDURE — 3008F BODY MASS INDEX DOCD: CPT | Performed by: NURSE PRACTITIONER

## 2020-07-27 PROCEDURE — 3074F SYST BP LT 130 MM HG: CPT | Performed by: NURSE PRACTITIONER

## 2020-07-27 PROCEDURE — 1036F TOBACCO NON-USER: CPT | Performed by: NURSE PRACTITIONER

## 2020-07-27 PROCEDURE — 99213 OFFICE O/P EST LOW 20 MIN: CPT | Performed by: NURSE PRACTITIONER

## 2020-07-27 RX ORDER — CEPHALEXIN 500 MG/1
500 CAPSULE ORAL EVERY 6 HOURS SCHEDULED
Qty: 12 CAPSULE | Refills: 0 | Status: SHIPPED | OUTPATIENT
Start: 2020-07-28 | End: 2020-07-31

## 2020-07-27 RX ORDER — HYDROCODONE BITARTRATE AND ACETAMINOPHEN 5; 325 MG/1; MG/1
TABLET ORAL
Qty: 40 TABLET | Refills: 0 | Status: SHIPPED | OUTPATIENT
Start: 2020-07-28 | End: 2020-08-04 | Stop reason: SDUPTHER

## 2020-07-27 RX ORDER — NALOXONE HYDROCHLORIDE 4 MG/.1ML
SPRAY NASAL
Qty: 1 EACH | Refills: 1 | Status: SHIPPED | OUTPATIENT
Start: 2020-07-27 | End: 2020-09-09 | Stop reason: HOSPADM

## 2020-07-27 RX ORDER — MORPHINE SULFATE 15 MG/1
15 TABLET, FILM COATED, EXTENDED RELEASE ORAL 2 TIMES DAILY
Qty: 60 TABLET | Refills: 0 | Status: ON HOLD | OUTPATIENT
Start: 2020-07-27 | End: 2020-09-09 | Stop reason: SDUPTHER

## 2020-07-27 NOTE — ASSESSMENT & PLAN NOTE
· As detailed in HPI  · Surgery rescheduled for tomorrow 7/28/28 Insertion Thoracici spinal cord stimulator  · Skin integrity alterations resolved , refer to attached photos

## 2020-07-27 NOTE — ASSESSMENT & PLAN NOTE
· As detailed in HPI  · Open area along thoracic incision scar -resolved   · Refer to photo attachment   · Continues ABX treatment, has three doses remaining  Did not take dose yet today   Should complete last dose in AM explained take with a sip of water along with other meds allowed to take on sx day

## 2020-07-27 NOTE — ASSESSMENT & PLAN NOTE
· A detailed in HPI   · Rash resolved   · Continues treating with Kenalog  · Refer to attachment photo

## 2020-07-27 NOTE — PROGRESS NOTES
Assessment/Plan:    Impaired skin integrity associated with surgical incision  · As detailed in HPI  · Open area along thoracic incision scar -resolved   · Refer to photo attachment   · Continues ABX treatment, has three doses remaining  Did not take dose yet today   Should complete last dose in AM explained take with a sip of water along with other meds allowed to take on sx day  Superficial bacterial infection of skin  · A detailed in HPI   · Rash resolved   · Continues treating with Kenalog  · Refer to attachment photo    Chronic pain syndrome  · As detailed in HPI  · Surgery rescheduled for tomorrow 7/28/28 Insertion Thoracici spinal cord stimulator  · Skin integrity alterations resolved , refer to attached photos  Subjective: Hope I can have surgery soon I am in so much pain   Patient ID: Joide Lisa is a 62 y o  female     HPI   She has a longstanding history of chronic pain syndrome affecting her lower back and both lower extremities   She is status post lumbar surgery, with resultant post laminectomy syndrome from a thoracici lumbar  fusion  She has failed  conservative treatment   She underwent a Medtronic trial spinal cord stimulator insertion (while living in [place])  , reported achieving 50 % efficacy  She was referred to Dr Aneesh Reyes by colleague Dr Urszula Shankar for assessment of appropriateness to insert a spinal cord stimulator or IT pain pump  After assessment and evaluation is deemed and appropriate candidate to proceed with insertion of Thoracic Spinal Cord Stimulator  She is scheduled fr surgery 7/21 2020 w/ DR Aneesh Reyes for INSERTION THORACIC DORSAL COLUMN SPINAL CORD STIMULATOR PERCUTANEOUS W IMPLANTABLE PULSE GENERATOR, RIGHT (Right Spine Thoracic)     PCP surgery clearance appointment noted as per note excerpt   Rash - Noted to have a rash located on her back, small psutules with no evidence of actual cellutlitis, itching reported with excoriations  Surgery was 2 years ago  Will try higher dose steroid  -triamcinolone (KENALOG) 0 025 % cream; Apply topically 2 (two) times a day    Appointment 7/20 skin assessments revealed old healed thoracici incision scar with a moist open area, reports she picked the scab off area, also assessed rash located in medial buttocks area and  Intergluteal cleft, with excoriated appearance  Currently treating rash with Kenalog prescribed by PCP      Surgery cancelled reassess in 1 week  Doxycycline prescribed x 1 week  Start Hibiclens body surgery preop on Saturday in the event surgery can be rescheduled for the week she returns for recheck  I have spent 10 minutes with patient today in which greater than 50% of this time was spent in assessment, examination, impressions, reviewing recommendations for care  All questions were answered to patients satisfaction and understanding, contact information provided in the event additional questions arise  Patient acknowledged an understanding and agreement with plan  REVIEW OF SYSTEMS  Review of Systems   Constitutional: Negative  Negative for fatigue  HENT: Negative  Eyes: Negative  Respiratory: Negative  Cardiovascular: Negative  Gastrointestinal: Positive for constipation  Endocrine: Negative  Genitourinary: Negative for frequency  Musculoskeletal: Positive for back pain (up/down spine, across lower back radiates into bilateral hips, bilateral buttock, and down bilateral legs/knees down into feet ), gait problem and joint swelling  Uses walker for assistance   Standing up to walk is getting harder  Leg pain is making it harder to walk  Allergic/Immunologic: Negative  Neurological: Positive for weakness (bilateral legs  ) and numbness (bilateral legs numbness and tingling )  Negative for headaches (h/o migraines )  Hematological: Bruises/bleeds easily (patient on ASA 81)  Psychiatric/Behavioral: Positive for sleep disturbance (due to pain )     All other systems reviewed and are negative          Meds/Allergies     Current Outpatient Medications   Medication Sig Dispense Refill    amLODIPine (NORVASC) 5 mg tablet Take 1 tablet (5 mg total) by mouth daily 90 tablet 3    busPIRone (BUSPAR) 5 mg tablet TAKE 1 TABLET (5 MG TOTAL) BY MOUTH 3 (THREE) TIMES A DAY 90 tablet 0    doxycycline hyclate (VIBRAMYCIN) 100 mg capsule Take 1 capsule (100 mg total) by mouth every 12 (twelve) hours for 7 days 14 capsule 0    DULoxetine (CYMBALTA) 30 mg delayed release capsule Take 30 mg by mouth daily      DULoxetine (CYMBALTA) 60 mg delayed release capsule Take 1 capsule (60 mg total) by mouth daily 90 capsule 0    hydrOXYzine HCL (ATARAX) 50 mg tablet Take 1 tablet (50 mg total) by mouth daily at bedtime as needed for anxiety (insomnia) 90 tablet 2    LORazepam (ATIVAN) 0 5 mg tablet Take 0 5 mg by mouth every 8 (eight) hours as needed      omeprazole (PriLOSEC) 20 mg delayed release capsule Take 1 capsule (20 mg total) by mouth daily 90 capsule 3    oxybutynin (DITROPAN) 5 mg tablet Take 1 tablet (5 mg total) by mouth 2 (two) times a day 180 tablet 3    pregabalin (LYRICA) 150 mg capsule Take 1 capsule (150 mg total) by mouth 3 (three) times a day 90 capsule 2    propranolol (INDERAL) 20 mg tablet Take 1 tablet (20 mg total) by mouth 2 (two) times a day 180 tablet 3    QUEtiapine (SEROquel XR) 300 mg 24 hr tablet Take 1 tablet (300 mg total) by mouth daily at bedtime 60 tablet 1    traZODone (DESYREL) 150 mg tablet Take 1 tablet (150 mg total) by mouth daily at bedtime 30 tablet 1    Valbenazine Tosylate (Ingrezza) 80 MG CAPS Take 80 mg by mouth daily      [START ON 7/28/2020] cephalexin (KEFLEX) 500 mg capsule Take 1 capsule (500 mg total) by mouth every 6 (six) hours for 3 days Start 7/28 after surgery 12 capsule 0    diclofenac sodium (VOLTAREN) 1 % Apply 4 g topically 4 (four) times a day (Patient not taking: Reported on 7/20/2020) 30 Tube 0    lidocaine (LMX) 4 % cream Apply topically as needed for mild pain (Patient not taking: Reported on 2020) 30 g 0    morphine (MS CONTIN) 15 mg 12 hr tablet Take 1 tablet (15 mg total) by mouth 2 (two) times a dayMax Daily Amount: 30 mg 60 tablet 0    naloxone (NARCAN) 4 mg/0 1 mL nasal spray Administer 1 spray into a nostril  If breathing does not return to normal or if breathing difficulty resumes after 2-3 minutes, give another dose in the other nostril using a new spray  1 each 1    prazosin (MINIPRESS) 2 mg capsule TAKE 1 CAPSULE BY MOUTH EVERY DAY 30 capsule 1    triamcinolone (KENALOG) 0 025 % cream Apply topically 2 (two) times a day (Patient not taking: Reported on 2020) 30 g 0     No current facility-administered medications for this visit  No Known Allergies    PAST HISTORY    Past Medical History:   Diagnosis Date    Acid reflux     Anxiety     RESOLVED: [id number]    Arthritis     Bipolar 2 disorder (HCC)     FOLLOWS WITH PSYCHIATRIST  CONTINUE LAMOTRIGINE; RESOLVED: [id number]    Depression     Familial tremor     both hands    Fibromyalgia     LAST ASSESSED:     Hearing aid worn     left ear    [place] (hard of hearing[place]     left ear    Hypertension     Left-sided weakness     Lower back pain     Memory loss of unknown cause     long and short term    Migraine     Overactive bladder     Panic attack     Post traumatic stress disorder     Seasonal allergies     Stroke [facility])     questionable stroke 2009    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    CONT ASA 81 MG DAILY; RESOLVED: [id number]    Urinary incontinence     Wears dentures     partial lower / full upper    Wears glasses        Past Surgical History:   Procedure Laterality Date    BACK SURGERY       SECTION      COLONOSCOPY      RESOLVED: [id number]    EAR SURGERY      HYSTERECTOMY  2004    MYRINGOTOMY W/ TUBES Left     NECK SURGERY  2019    [place] CYSTOURETHROSCOPY N/A 2016 Procedure: CYSTOSCOPY, BOTOX INJECTION;  Surgeon: Jimmy Black MD;  [facility];  Service: Gynecology    TONSILLECTOMY      TUBAL LIGATION  1986       Social History     Tobacco Use    Smoking status: Never Smoker    Smokeless tobacco: Never Used   Substance Use Topics    Alcohol use: Not Currently     Comment: 2 x year; being a social drinker as per all scripts     Drug use: No       Family History   Problem Relation Age of Onset    Colon cancer Mother     Alzheimer's disease Father     Stroke Father     Colon cancer Brother     Bipolar disorder Brother     Breast cancer Maternal Aunt     Colon cancer Maternal Aunt     Heart disease Other     Diabetes Other     Hypertension Other     Seizures Son     Depression Paternal Grandfather     No Known Problems Sister     No Known Problems Brother     Thyroid disease Neg Hx        The following portions of the patient's history were reviewed and updated as appropriate: allergies, current medications, past family history, past medical history, past social history, past surgical history and problem list       EXAM    Vitals:Blood pressure 124/87, pulse 62, temperature 98 °F (36 7 °C), temperature source Tympanic, resp  rate 16, height 4' 10" (1 473 m), weight 91 6 kg (202 lb), not currently breastfeeding  ,Body mass index is 42 22 kg/m²  Physical Exam   Constitutional: She is oriented to person, place, and time  No distress  Morbid obesity BMI 42 22    HENT:   Head: Atraumatic  Eyes: EOM are normal  Right eye exhibits no discharge  Left eye exhibits no discharge  No scleral icterus  Corrective lenses    Cardiovascular: Normal rate and regular rhythm  Pulmonary/Chest: Effort normal  No respiratory distress  Musculoskeletal: She exhibits no edema or tenderness  scissoring of bilateral lower extremities , ambulates using a wheeled seated walker, stooped posture      Neurological: She is alert and oriented to person, place, and time  No cranial nerve deficit  Coordination normal    Skin: Skin is warm and dry  No rash noted  She is not diaphoretic  No erythema  No pallor  Psychiatric: She has a normal mood and affect  Her behavior is normal    Nursing note and vitals reviewed  Neurologic Exam     Mental Status   Oriented to person, place, and time       Cranial Nerves     CN III, IV, VI   Extraocular motions are normal        Imaging Studies  N/A

## 2020-07-27 NOTE — TELEPHONE ENCOUNTER
Pre operative call day prior surgery scheduled in the AM w/ Dr Jordy Hall, RIGHT (Right Spine Thoracic)--    Discussion/Review    Allergies ---Reviewed   Hold medications --- Reviewed   NPO after [place], night prior surgery ---Reviewed as instructed by ASU nurse  Medication (s) instructed by healthcare provider to take the morning of surgery w/sip of water 4 OZ discussed: as instructed by ASU nurse    Post operative antibiotic electronic transmission to pharmacy: cephalexin    PDMP site reviewed accessed and reviewed scheduled drug list printed and scanned into record    Pain management script:spoke with [address] [place] [address] today  neurosurgery can order postoperative opiate for surgical pain x 5 days only , after 5 days post op patient should resume preoperative Morphine dosing 15 mg po bid , refer her to [address] [place] [address] for all pain management , they r the primary opiate prescriber ([phone])    Hydrocodone prescribed 5 days supply only --stop morphine locked secure storage   Pre- operative shower protocol reviewed; Clarify instructions as per protocol, third chlorhexidine shower tonight before surgery, then use BRIAN wipes as per packaging instructions, Use a clean towel and wash cloth starting tonight and continue nightly until seen 2 weeks post operative visit for incision check removal  Change bed linens tonight and continue at least 1-2 times weekly  reinforced     Informed will receive a telephone call tonight from a hospital representative with time to report on surgery day: --reinforced     Informed will receive a f/u call within in 24 -48 hours post-op to assess recovery reinforce instructions, and to answer any questions    --reinforced     Follow-up appointments reviewed: documented in discharge paper work 2 week 8/11/20      6 week 9/17/20         Contact numbers call daughters phone patients phone not working properly   Dgt Mae Interiano    [phone]  or [phone]--always call     Patient/ Dgt Cyrie  verbalized understanding information provided /discussed

## 2020-07-28 ENCOUNTER — APPOINTMENT (OUTPATIENT)
Dept: RADIOLOGY | Facility: HOSPITAL | Age: 55
End: 2020-07-28
Payer: COMMERCIAL

## 2020-07-28 ENCOUNTER — HOSPITAL ENCOUNTER (OUTPATIENT)
Facility: HOSPITAL | Age: 55
Setting detail: OUTPATIENT SURGERY
Discharge: HOME | End: 2020-07-28
Attending: NEUROLOGICAL SURGERY | Admitting: NEUROLOGICAL SURGERY
Payer: COMMERCIAL

## 2020-07-28 ENCOUNTER — ANESTHESIA (OUTPATIENT)
Dept: PERIOP | Facility: HOSPITAL | Age: 55
End: 2020-07-28
Payer: COMMERCIAL

## 2020-07-28 ENCOUNTER — ANESTHESIA EVENT (OUTPATIENT)
Dept: PERIOP | Facility: HOSPITAL | Age: 55
End: 2020-07-28
Payer: COMMERCIAL

## 2020-07-28 VITALS
RESPIRATION RATE: 20 BRPM | OXYGEN SATURATION: 99 % | TEMPERATURE: 97.4 F | HEART RATE: 62 BPM | SYSTOLIC BLOOD PRESSURE: 177 MMHG | DIASTOLIC BLOOD PRESSURE: 95 MMHG

## 2020-07-28 PROCEDURE — C1787 PATIENT PROGR, NEUROSTIM: HCPCS | Performed by: NEUROLOGICAL SURGERY

## 2020-07-28 PROCEDURE — 63650 IMPLANT NEUROELECTRODES: CPT | Performed by: PHYSICIAN ASSISTANT

## 2020-07-28 PROCEDURE — 95972 ALYS CPLX SP/PN NPGT W/PRGRM: CPT | Performed by: NEUROLOGICAL SURGERY

## 2020-07-28 PROCEDURE — 63685 INS/RPLC SPI NPG/RCVR POCKET: CPT | Performed by: PHYSICIAN ASSISTANT

## 2020-07-28 PROCEDURE — 63650 IMPLANT NEUROELECTRODES: CPT | Performed by: NEUROLOGICAL SURGERY

## 2020-07-28 PROCEDURE — 72070 X-RAY EXAM THORAC SPINE 2VWS: CPT

## 2020-07-28 PROCEDURE — C1778 LEAD, NEUROSTIMULATOR: HCPCS | Performed by: NEUROLOGICAL SURGERY

## 2020-07-28 PROCEDURE — C1820 GENERATOR NEURO RECHG BAT SY: HCPCS | Performed by: NEUROLOGICAL SURGERY

## 2020-07-28 PROCEDURE — 63685 INS/RPLC SPI NPG/RCVR POCKET: CPT | Performed by: NEUROLOGICAL SURGERY

## 2020-07-28 DEVICE — LEAD KIT 8 CONTACT SCS MRI: Type: IMPLANTABLE DEVICE | Site: EPIDURAL SPACE | Status: FUNCTIONAL

## 2020-07-28 DEVICE — NEUROSTIM INTELLIS SURESCAN MRI W/ ADAPTIVESTIM: Type: IMPLANTABLE DEVICE | Site: BUTTOCKS | Status: FUNCTIONAL

## 2020-07-28 RX ORDER — PROPOFOL 10 MG/ML
INJECTION, EMULSION INTRAVENOUS CONTINUOUS PRN
Status: DISCONTINUED | OUTPATIENT
Start: 2020-07-28 | End: 2020-07-28 | Stop reason: SURG

## 2020-07-28 RX ORDER — FENTANYL CITRATE 50 UG/ML
INJECTION, SOLUTION INTRAMUSCULAR; INTRAVENOUS AS NEEDED
Status: DISCONTINUED | OUTPATIENT
Start: 2020-07-28 | End: 2020-07-28 | Stop reason: SURG

## 2020-07-28 RX ORDER — MIDAZOLAM HYDROCHLORIDE 2 MG/2ML
INJECTION, SOLUTION INTRAMUSCULAR; INTRAVENOUS AS NEEDED
Status: DISCONTINUED | OUTPATIENT
Start: 2020-07-28 | End: 2020-07-28 | Stop reason: SURG

## 2020-07-28 RX ORDER — VANCOMYCIN HYDROCHLORIDE 1 G/20ML
INJECTION, POWDER, LYOPHILIZED, FOR SOLUTION INTRAVENOUS AS NEEDED
Status: DISCONTINUED | OUTPATIENT
Start: 2020-07-28 | End: 2020-07-28 | Stop reason: SURG

## 2020-07-28 RX ORDER — BUPIVACAINE HYDROCHLORIDE 2.5 MG/ML
INJECTION, SOLUTION EPIDURAL; INFILTRATION; INTRACAUDAL AS NEEDED
Status: DISCONTINUED | OUTPATIENT
Start: 2020-07-28 | End: 2020-07-28 | Stop reason: HOSPADM

## 2020-07-28 RX ORDER — HYDROMORPHONE HCL/PF 1 MG/ML
0.5 SYRINGE (ML) INJECTION
Status: DISCONTINUED | OUTPATIENT
Start: 2020-07-28 | End: 2020-07-28 | Stop reason: HOSPADM

## 2020-07-28 RX ORDER — FENTANYL CITRATE/PF 50 MCG/ML
25 SYRINGE (ML) INJECTION
Status: DISCONTINUED | OUTPATIENT
Start: 2020-07-28 | End: 2020-07-28 | Stop reason: HOSPADM

## 2020-07-28 RX ORDER — CHLORHEXIDINE GLUCONATE 0.12 MG/ML
15 RINSE ORAL ONCE
Status: COMPLETED | OUTPATIENT
Start: 2020-07-28 | End: 2020-07-28

## 2020-07-28 RX ORDER — LIDOCAINE HYDROCHLORIDE AND EPINEPHRINE 10; 10 MG/ML; UG/ML
INJECTION, SOLUTION INFILTRATION; PERINEURAL AS NEEDED
Status: DISCONTINUED | OUTPATIENT
Start: 2020-07-28 | End: 2020-07-28 | Stop reason: HOSPADM

## 2020-07-28 RX ORDER — VANCOMYCIN HYDROCHLORIDE 1 G/200ML
1000 INJECTION, SOLUTION INTRAVENOUS ONCE
Status: COMPLETED | OUTPATIENT
Start: 2020-07-28 | End: 2020-07-28

## 2020-07-28 RX ORDER — VANCOMYCIN HYDROCHLORIDE 1 G/200ML
INJECTION, SOLUTION INTRAVENOUS AS NEEDED
Status: DISCONTINUED | OUTPATIENT
Start: 2020-07-28 | End: 2020-07-28 | Stop reason: SURG

## 2020-07-28 RX ORDER — PROPOFOL 10 MG/ML
INJECTION, EMULSION INTRAVENOUS AS NEEDED
Status: DISCONTINUED | OUTPATIENT
Start: 2020-07-28 | End: 2020-07-28 | Stop reason: SURG

## 2020-07-28 RX ORDER — ONDANSETRON 2 MG/ML
4 INJECTION INTRAMUSCULAR; INTRAVENOUS ONCE
Status: DISCONTINUED | OUTPATIENT
Start: 2020-07-28 | End: 2020-07-28 | Stop reason: HOSPADM

## 2020-07-28 RX ORDER — HYDROCODONE BITARTRATE AND ACETAMINOPHEN 5; 325 MG/1; MG/1
1 TABLET ORAL EVERY 6 HOURS PRN
Status: DISCONTINUED | OUTPATIENT
Start: 2020-07-28 | End: 2020-07-28 | Stop reason: HOSPADM

## 2020-07-28 RX ORDER — SUCCINYLCHOLINE/SOD CL,ISO/PF 100 MG/5ML
SYRINGE (ML) INTRAVENOUS AS NEEDED
Status: DISCONTINUED | OUTPATIENT
Start: 2020-07-28 | End: 2020-07-28 | Stop reason: SURG

## 2020-07-28 RX ORDER — SODIUM CHLORIDE, SODIUM LACTATE, POTASSIUM CHLORIDE, CALCIUM CHLORIDE 600; 310; 30; 20 MG/100ML; MG/100ML; MG/100ML; MG/100ML
75 INJECTION, SOLUTION INTRAVENOUS CONTINUOUS
Status: DISCONTINUED | OUTPATIENT
Start: 2020-07-28 | End: 2020-07-28 | Stop reason: HOSPADM

## 2020-07-28 RX ADMIN — MIDAZOLAM 2 MG: 1 INJECTION INTRAMUSCULAR; INTRAVENOUS at 12:08

## 2020-07-28 RX ADMIN — FENTANYL CITRATE 100 MCG: 50 INJECTION, SOLUTION INTRAMUSCULAR; INTRAVENOUS at 12:20

## 2020-07-28 RX ADMIN — HYDROCODONE BITARTRATE AND ACETAMINOPHEN 1 TABLET: 5; 325 TABLET ORAL at 14:45

## 2020-07-28 RX ADMIN — VANCOMYCIN HYDROCHLORIDE 1000 MG: 1 INJECTION, SOLUTION INTRAVENOUS at 10:37

## 2020-07-28 RX ADMIN — HYDROCODONE BITARTRATE AND ACETAMINOPHEN 1 TABLET: 5; 325 TABLET ORAL at 19:44

## 2020-07-28 RX ADMIN — VANCOMYCIN HYDROCHLORIDE 1000 MG: 1 INJECTION, SOLUTION INTRAVENOUS at 13:59

## 2020-07-28 RX ADMIN — FENTANYL CITRATE 50 MCG: 50 INJECTION, SOLUTION INTRAMUSCULAR; INTRAVENOUS at 12:53

## 2020-07-28 RX ADMIN — PROPOFOL 100 MCG/KG/MIN: 10 INJECTION, EMULSION INTRAVENOUS at 12:16

## 2020-07-28 RX ADMIN — VANCOMYCIN HYDROCHLORIDE 1 G: 1 INJECTION, POWDER, LYOPHILIZED, FOR SOLUTION INTRAVENOUS at 13:02

## 2020-07-28 RX ADMIN — PROPOFOL 200 MG: 10 INJECTION, EMULSION INTRAVENOUS at 12:08

## 2020-07-28 RX ADMIN — SODIUM CHLORIDE, SODIUM LACTATE, POTASSIUM CHLORIDE, AND CALCIUM CHLORIDE 75 ML/HR: .6; .31; .03; .02 INJECTION, SOLUTION INTRAVENOUS at 10:37

## 2020-07-28 RX ADMIN — CHLORHEXIDINE GLUCONATE 0.12% ORAL RINSE 15 ML: 1.2 LIQUID ORAL at 10:37

## 2020-07-28 RX ADMIN — FENTANYL CITRATE 100 MCG: 50 INJECTION, SOLUTION INTRAMUSCULAR; INTRAVENOUS at 12:08

## 2020-07-28 RX ADMIN — FENTANYL CITRATE 50 MCG: 50 INJECTION, SOLUTION INTRAMUSCULAR; INTRAVENOUS at 13:00

## 2020-07-28 RX ADMIN — Medication 1 MG/KG/HR: at 12:18

## 2020-07-28 RX ADMIN — Medication 100 MG: at 12:08

## 2020-07-28 NOTE — ANESTHESIA PREPROCEDURE EVALUATION
Review of Systems/Medical History  Patient summary reviewed        Cardiovascular  EKG reviewed, Negative cardio ROS Hypertension controlled,    Pulmonary  Negative pulmonary ROS Sleep apnea CPAP,        GI/Hepatic  Negative GI/hepatic ROS   GERD well controlled,        Negative  ROS        Endo/Other  Negative endo/other ROS   Obesity    GYN  Negative gynecology ROS          Hematology  Negative hematology ROS      Musculoskeletal  Negative musculoskeletal ROS Scoliosis ,   Arthritis     Neurology  Negative neurology ROS   CVA , residual symptoms,    Psychology   Negative psychology ROS Anxiety, Depression , depressed,   Chronic opioid dependence Chronic pain,            Physical Exam    Airway    Mallampati score: II  TM Distance: >3 FB  Neck ROM: full     Dental       Cardiovascular  Comment: Negative ROS, Rhythm: regular, Rate: normal, Cardiovascular exam normal    Pulmonary  Pulmonary exam normal Breath sounds clear to auscultation,     Other Findings        Anesthesia Plan  ASA Score- 3     Anesthesia Type- general with ASA Monitors  Additional Monitors:   Airway Plan: ETT  Plan Factors-    Induction- intravenous  Postoperative Plan-     Informed Consent- Anesthetic plan and risks discussed with patient  I personally reviewed this patient with the CRNA  Discussed and agreed on the Anesthesia Plan with the CRNA  Rian Ricks

## 2020-07-28 NOTE — NURSING NOTE
Patient given written and verbal discharge instructions  Reminding multiple times that prescriptions were awaiting  at the [facility] in [facility]  Patient instructed to take antibiotics until complete  Patient asked multiple times for morphine refill  Instructed patient to stop morphine as written by MD on discharge instructions  Patient told numerous times that another narcotic was prescribed and awaiting pick from her chosen pharmacy  Hand written instructions also placed on printable discharge paperwork regarding narcotics uses for discharge  Patient awaiting discharge with no transportation available  Daughter called and made aware patient was ready for discharge  Daughter to call patient's  for   Multiple attempts to reach  made  Message left with  regarding transportation by RN and by patient  Nurse manager and clinical leader notified of transportation problems  Spoke with patient at bedside

## 2020-07-28 NOTE — PERIOPERATIVE NURSING NOTE
As per patient, patient arrived to hospital via [place]  Upon arrival to the hospital for surgery, patient's daughter was contacted concerning transportation home after surgery  Daughter informed charge nurse she was able to pick patient up after surgery  After surgery patient's daughter was contacted and she is unable to come get patient  Daughter informed staff that her father was picking patient up  Father's contact information was provided to staff and multiple messages were left with no return call

## 2020-07-28 NOTE — DISCHARGE INSTRUCTIONS
Discharge Instructions  Thoracic Spinal Cord Stimulator (SCS)  Activity:  1  Do not lift more than 10 pounds for 6 weeks  2  Avoid bending, lifting and twisting for 6 weeks  3  No strenuous activities  No driving for 2 weeks  4  When able to shower, continue to use clean towel and washcloth for 2 weeks post-op  5  Continue to change bed linens and pajamas more frequently  Wear clean clothes daily  Surgical incision care:  1  For Permanent SCS placement:  a  Keep incisions dry for 3 days  b  May shower with mild antimicrobial soap after 3 days  c  After 3 days, incisions may be left open to air, but must remain clean  2  Do not immerse the incisions in water for 6 weeks  3  Do not apply any creams or ointments to the incision for 6 weeks, unless otherwise instructed by [facility] \[facility] [place] [facility]\"" - [facility]  4  Contact office if increasing redness, drainage, pain or swelling around the incisions  Postoperative medication:  1  Complete course of antibiotic as directed  a  For permanent spinal cord stimulators: 3 days  2  [address] will provide pain medication as coordinated with your pain specialist  All prescriptions must come from a single provider  a  Take all medications as prescribed  Call office with any questions/concerns  b  After 5 days postop, should resume preoperative Morphine dosing  Please contact your primary opiate prescriber ([phone] - [address] [place] [address]) for all pain management  3  Please contact office for questions regarding dosage and modifications  4  No antiplatelet, anticoagulation or Nonsteroidal anti-inflammatory (NSAIDs) medication until cleared by [address], unless otherwise instructed  5  Do not operate heavy machinery or vehicles while taking sedating medications  6  Use a bowel regimen while on opioids as they induce constipation  Ie  Senokot-S, Miralax, Colace, etc  Increase fiber and water intake       ***Note that it may take some time for the stimulator to improve chronic pain symptoms  Adjustment of the stimulator program can begin 2 weeks after placement   ***

## 2020-07-28 NOTE — OP NOTE
OPERATIVE REPORT  PATIENT NAME: Ced Aguayo    :  1965  MRN: [id number]  Pt Location: [facility] OR ROOM 03    SURGERY DATE: 2020    Surgeon(s) and Role:     * Esha Fish MD - Primary     * Marge Tomas PA-C - Assisting    Preop Diagnosis:  Chronic pain syndrome [G89 4]  Postlaminectomy syndrome [M96 1]    Post-Op Diagnosis Codes:     * Chronic pain syndrome [G89 4]     * Postlaminectomy syndrome [M96 1]    Procedure(s) (LRB):  INSERTION THORACIC DORSAL COLUMN SPINAL CORD STIMULATOR PERCUTANEOUS W IMPLANTABLE PULSE GENERATOR, RIGHT (Right)    Specimen(s):  * No specimens in log *    Estimated Blood Loss:   Minimal    Drains:  * No LDAs found *    Anesthesia Type:   General    Operative Indications:  Chronic pain syndrome [G89 4]  Postlaminectomy syndrome [M96 1]      Operative Findings:  See dictated note  ePARis  QW:[id number]  x1 electrode [id number]    Complications:   None    Procedure and Technique:  The patient was taken to operative theater induced under general anesthesia and intubated she was positioned prone a Collin frame  The sweet spot during the trial was planned at T7-9  A right buttock incision was planned for the generator she was prepped and draped in sterile fashion  We used an epidmed needle to gain epidural access via loss of resistance technique  We traveled one electrode through one epidural access needles up to towards approximately T7 on the top  We confirmed placement of the the lead on fluoro  I decided against another electrode because she had extensive surgery for scoliosis and scar therefore limiting safe placement of another electrode  We tested the EMG response by altering the following parameters using a neurostimulation device   We programmed the following:    Frequency: 10-60 hertz   Pulse width of 350 us  Amplitudes: 0-10 mA   Contacts: midline contacts alternating contacts on the 8 contact electrode  Configuration: Bipolar with (+) and (-)  Cycling: None  Waveform: Tonic    We obtained the appropriate EMG response on the right and left leg      The pocket on the right was created with an knife and electrocautery  We tunneled that electrode connected to the impulse generator and this was connected  We then tested impedances which were normal     The lead had been anchored down using an anchor under serial of fluoroscopy  After all hardware was in place we irrigated each wound  And then closed in layers using 2 0 Vicryl for the subcutaneous tissues and monocryl for the skin sterile dressing was placed  Patient was repositioned supine the hospital bed extubated to room air  She was taken to the postop recovery area stable condition  All needle and sponge counts were correct at the procedure  All neuromonitoring remained stable during the procedure       I was present for the entire procedure, A qualified resident physician was not available and A physician assistant was required during the procedure for retraction tissue handling,dissection and suturing    Patient Disposition:  PACU     SIGNATURE: Monik Chapman MD  DATE: July 28, 2020  TIME: 1:20 PM

## 2020-07-28 NOTE — ANESTHESIA POSTPROCEDURE EVALUATION
Post-Op Assessment Note    CV Status:  Stable  Pain Score: 0    Pain management: adequate     Mental Status:  Alert and awake   Hydration Status:  Euvolemic   PONV Controlled:  Controlled   Airway Patency:  Patent   Post Op Vitals Reviewed: Yes      Staff: CRNA           BP (P) 141/89 (07/28/20 1349)    Temp (P) 97 5 °F (36 4 °C) (07/28/20 1349)    Pulse (P) 65 (07/28/20 1349)   Resp (P) 12 (07/28/20 1349)    SpO2 (P) 98 % (07/28/20 1349)

## 2020-07-28 NOTE — INTERVAL H&P NOTE
H&P reviewed  After examining the patient I find no changes in the patients condition since the H&P had been written      Vitals:    07/28/20 1024   BP: 158/100   Pulse: 70   Resp: 20   Temp: 97 6 °F (36 4 °C)   SpO2: 98%

## 2020-07-29 ENCOUNTER — PATIENT OUTREACH (OUTPATIENT)
Dept: INTERNAL MEDICINE CLINIC | Facility: CLINIC | Age: 55
End: 2020-07-29

## 2020-07-29 DIAGNOSIS — Z74.8 ASSISTANCE NEEDED WITH TRANSPORTATION: Primary | ICD-10-CM

## 2020-07-29 NOTE — PROGRESS NOTES
CHW attempted to reach the patient about her [employer] application but at this time there was no answer  CHW will remain available to help the patient with her transportation needs

## 2020-07-29 NOTE — PROGRESS NOTES
CHW attempted to contact the patient about doing a new [employer] application  At this time the patient did not answer  CHW left a message and a return telephone number for the patient to contact the CHW  CHW will remain available to help the patient with her transportation needs

## 2020-07-29 NOTE — PERIOPERATIVE NURSING NOTE
After unsuccessfully aquireing a ride home, patient signed out [place]  Left via [facility] to daughter's home where she stated she was staying and would be there to meet her

## 2020-07-30 ENCOUNTER — PATIENT OUTREACH (OUTPATIENT)
Dept: INTERNAL MEDICINE CLINIC | Facility: CLINIC | Age: 55
End: 2020-07-30

## 2020-07-30 ENCOUNTER — TELEMEDICINE (OUTPATIENT)
Dept: NEUROSURGERY | Facility: CLINIC | Age: 55
End: 2020-07-30

## 2020-07-30 DIAGNOSIS — Z98.890 STATUS POST SURGERY: Primary | ICD-10-CM

## 2020-07-30 PROCEDURE — 99024 POSTOP FOLLOW-UP VISIT: CPT

## 2020-07-30 NOTE — TELEPHONE ENCOUNTER
Patient is calling back to state she will be calling tomorrow to set up her appointments  She is unable to do it today

## 2020-07-30 NOTE — PROGRESS NOTES
CHW will be in the [address] [place] [address] today and will be stopping by the home of the patient to retrieve a signature for the patients [employer] application  CHW will not be conducting a full home visit due to covid restrictions  The patient supplied CHW a new address but stated that she has not made a proper changes yet when it comes to [address]  However the patient wanted this address noted for her [employer] pick ups  The address she gave the CHW was [address] [place]  CHW added this new address to the patients [employer] application for her rides

## 2020-07-30 NOTE — PROGRESS NOTES
Virtual Brief Visit    Assessment/Plan:    Problem List Items Addressed This Visit     None      Visit Diagnoses     Status post surgery    -  Primary                Reason for visit is   Chief Complaint   Patient presents with    Virtual Brief Visit        Encounter provider Lilia Law RN    Provider located at [address] [place] [address] [address] [place] [id number]    Recent Visits  No visits were found meeting these conditions  Showing recent visits within past 7 days and meeting all other requirements     Today's Visits  Date Type Provider Dept   07/30/20 Telemedicine Lilia Law RN [facility]   Showing today's visits and meeting all other requirements     Future Appointments  Date Type Provider Dept   07/30/20 Telemedicine Lilia Law RN [facility]   Showing future appointments within next 150 days and meeting all other requirements        After connecting through telephone, the patient was identified by name and date of birth  Alessandra Springer was informed that this is a telemedicine visit and that the visit is being conducted through telephone  My office door was closed  No one else was in the room  She acknowledged consent and understanding of privacy and security of the platform  The patient has agreed to participate and understands she can discontinue the visit at any time  Patient is aware this is a billable service  Subjective    Samantha QUINONEZ Gale Guajardo is a 62 y o  female s/p: [address], RIGHT by Dr Tunde Mullen  Patient reports that she is doing very well overall and denies any incisional issues or fevers  Patient denies any bleeding, dizziness, fever, redness, significant pain and swelling    Verified date/time/location of her upcoming POV and advised her to call the office with any further questions or concerns, or if any incisional issues or fevers would arise  Patient informed me that she has needed to take morphine for the pain  She was also unhappy with her experience in the hospital and felt like they were rushing her to find a ride and leave  Patient received and does understand the discharge instructions  Reviewed incision care with Patient and she has no further questions at this time  She was appreciative for the call  HPI     Past Medical History:   Diagnosis Date    Acid reflux     Anxiety     RESOLVED: [id number]    Arthritis     Bipolar 2 disorder (HCC)     FOLLOWS WITH PSYCHIATRIST  CONTINUE LAMOTRIGINE; RESOLVED: [id number]    Depression     Familial tremor     both hands    Fibromyalgia     LAST ASSESSED: [id number]    Hearing aid worn     left ear    [place] (hard of hearing)     left ear    Hypertension     Left-sided weakness     Lower back pain     Memory loss of unknown cause     long and short term    Migraine     Overactive bladder     Panic attack     Post traumatic stress disorder     Seasonal allergies     Stroke 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    CONT ASA 81 MG DAILY; RESOLVED: [id number]    Urinary incontinence     Wears dentures     partial lower / full upper    Wears glasses        Past Surgical History:   Procedure Laterality Date    BACK SURGERY       SECTION      COLONOSCOPY      RESOLVED: [id number]    EAR SURGERY      HYSTERECTOMY      MYRINGOTOMY W/ TUBES Left     NECK SURGERY  2019    NE CYSTOURETHROSCOPY N/A 2016    Procedure: CYSTOSCOPY, BOTOX INJECTION;  Surgeon: Charles Corbett MD;  Location: [facility];  Service: Gynecology    NE PERCUT IMPLNT Andrena David Right 2020    Procedure: INSERTION THORACIC DORSAL COLUMN SPINAL CORD STIMULATOR PERCUTANEOUS W IMPLANTABLE PULSE GENERATOR, RIGHT;  Surgeon: Allison Kehr, MD;  Location:  MAIN OR;  Service: Neurosurgery    TONSILLECTOMY      TUBAL LIGATION  1986       Current Outpatient Medications   Medication Sig Dispense Refill    amLODIPine (NORVASC) 5 mg tablet Take 1 tablet (5 mg total) by mouth daily 90 tablet 3    busPIRone (BUSPAR) 5 mg tablet TAKE 1 TABLET (5 MG TOTAL) BY MOUTH 3 (THREE) TIMES A DAY 90 tablet 0    cephalexin (KEFLEX) 500 mg capsule Take 1 capsule (500 mg total) by mouth every 6 (six) hours for 3 days Start 7/28 after surgery 12 capsule 0    diclofenac sodium (VOLTAREN) 1 % Apply 4 g topically 4 (four) times a day (Patient not taking: Reported on 7/20/2020) 30 Tube 0    DULoxetine (CYMBALTA) 30 mg delayed release capsule Take 30 mg by mouth daily      DULoxetine (CYMBALTA) 60 mg delayed release capsule Take 1 capsule (60 mg total) by mouth daily 90 capsule 0    HYDROcodone-acetaminophen (NORCO) 5-325 mg per tablet Take by mouth as needed for pain 1 tab every 4 hours moderate or 2 tabs every 6 hours severe start 7/28 after surgery 40 tablet 0    hydrOXYzine HCL (ATARAX) 50 mg tablet Take 1 tablet (50 mg total) by mouth daily at bedtime as needed for anxiety (insomnia) 90 tablet 2    lidocaine (LMX) 4 % cream Apply topically as needed for mild pain (Patient not taking: Reported on 7/27/2020) 30 g 0    LORazepam (ATIVAN) 0 5 mg tablet Take 0 5 mg by mouth every 8 (eight) hours as needed      morphine (MS CONTIN) 15 mg 12 hr tablet Take 1 tablet (15 mg total) by mouth 2 (two) times a dayMax Daily Amount: 30 mg 60 tablet 0    naloxone (NARCAN) 4 mg/0 1 mL nasal spray Administer 1 spray into a nostril  If breathing does not return to normal or if breathing difficulty resumes after 2-3 minutes, give another dose in the other nostril using a new spray   1 each 1    omeprazole (PriLOSEC) 20 mg delayed release capsule Take 1 capsule (20 mg total) by mouth daily 90 capsule 3    oxybutynin (DITROPAN) 5 mg tablet Take 1 tablet (5 mg total) by mouth 2 (two) times a day 180 tablet 3    prazosin (MINIPRESS) 2 mg capsule TAKE 1 CAPSULE BY MOUTH EVERY DAY 30 capsule 1    pregabalin (LYRICA) 150 mg capsule Take 1 capsule (150 mg total) by mouth 3 (three) times a day 90 capsule 2    propranolol (INDERAL) 20 mg tablet Take 1 tablet (20 mg total) by mouth 2 (two) times a day 180 tablet 3    QUEtiapine (SEROquel XR) 300 mg 24 hr tablet Take 1 tablet (300 mg total) by mouth daily at bedtime 60 tablet 1    traZODone (DESYREL) 150 mg tablet Take 1 tablet (150 mg total) by mouth daily at bedtime 30 tablet 1    triamcinolone (KENALOG) 0 025 % cream Apply topically 2 (two) times a day 30 g 0    Valbenazine Tosylate (Ingrezza) 80 MG CAPS Take 80 mg by mouth daily       No current facility-administered medications for this visit  No Known Allergies    Review of Systems    There were no vitals filed for this visit  VIRTUAL VISIT DISCLAIMER    Samantha Shaffer acknowledges that she has consented to an online visit or consultation  She understands that the online visit is based solely on information provided by her, and that, in the absence of a face-to-face physical evaluation by the physician, the diagnosis she receives is both limited and provisional in terms of accuracy and completeness  This is not intended to replace a full medical face-to-face evaluation by the physician  Samantha Shaffer understands and accepts these terms

## 2020-07-30 NOTE — PROGRESS NOTES
CHW arrived to the patients home and called her to let her know CHW had arrived  CHW met the patients daughter outside her home and had her sign the forms for the patients [employer] application  CHW also gave the patients daughter the Mental Health form for [employer]  CHW called back into the house to talk to the patient and explained to her that she must have the Mental Health forms filled out in order to submit her [employer] application  Patient agreed and will be making an appointment with her current provider  CHW will remain available to help the patient with her transportation needs

## 2020-08-03 ENCOUNTER — TELEPHONE (OUTPATIENT)
Dept: NEUROSURGERY | Facility: CLINIC | Age: 55
End: 2020-08-03

## 2020-08-03 NOTE — TELEPHONE ENCOUNTER
Patient telephoned requesting refill of hydrocodone  Reminded patient [address] [place] [address] granted neurosurgery 5 days only for postoperative opiate prescribing  Complains of chronic  and acute postoperative pain in operative sites  Spinal Cord stimulator will activate at 2 week Postoperative visit if incisions healing appropriately  She is referred to pain clinic for opiate prescribing needs  Has Morphine 15 mg suggest she take as prescribed , add acetaminophen 500 mg can take up to 6 tablets per day , take one every 4 hours or 2 every 8 hours

## 2020-08-04 ENCOUNTER — TELEPHONE (OUTPATIENT)
Dept: INTERNAL MEDICINE CLINIC | Facility: CLINIC | Age: 55
End: 2020-08-04

## 2020-08-04 ENCOUNTER — TELEPHONE (OUTPATIENT)
Dept: OTHER | Facility: OTHER | Age: 55
End: 2020-08-04

## 2020-08-04 ENCOUNTER — TELEPHONE (OUTPATIENT)
Dept: NEUROSURGERY | Facility: CLINIC | Age: 55
End: 2020-08-04

## 2020-08-04 DIAGNOSIS — E55.9 VITAMIN D DEFICIENCY: Primary | ICD-10-CM

## 2020-08-04 DIAGNOSIS — Z98.890 POSTOPERATIVE STATE: ICD-10-CM

## 2020-08-04 DIAGNOSIS — G89.18 POSTOPERATIVE PAIN: Primary | ICD-10-CM

## 2020-08-04 DIAGNOSIS — M54.16 LUMBAR RADICULOPATHY: ICD-10-CM

## 2020-08-04 RX ORDER — HYDROCODONE BITARTRATE AND ACETAMINOPHEN 5; 325 MG/1; MG/1
TABLET ORAL
Qty: 24 TABLET | Refills: 0 | Status: SHIPPED | OUTPATIENT
Start: 2020-08-04 | End: 2020-09-09 | Stop reason: HOSPADM

## 2020-08-04 RX ORDER — MELATONIN
1000 DAILY
Qty: 90 TABLET | Refills: 5 | Status: SHIPPED | OUTPATIENT
Start: 2020-08-04 | End: 2021-07-07 | Stop reason: HOSPADM

## 2020-08-04 NOTE — TELEPHONE ENCOUNTER
Patient called with her [id number] on the line  They are looking for refills of these medications  None were on her current active medication list     Amitiza  Vitamin D  Aspirin  Zyrtec  Lactulose  Mobic  Senna Plus  Please advise if patient should be on any of theses medications  She would need refills on all of these

## 2020-08-04 NOTE — TELEPHONE ENCOUNTER
I called and spoke to patient and made her aware it is ok to continue with the hydrocodone and to call neuro to get a script

## 2020-08-04 NOTE — TELEPHONE ENCOUNTER
Patient is calling to see if her Doctor is opposed to her getting more Hydrocodone from her neurosurgeon  She would like to have the 5 days extended  The surgeon said she would have to check with her PCP before anything was prescribed  Please advise and let patient know  She is still in a lot of pain from the surgery  She states the Hydrocodone was working better than the morphine

## 2020-08-04 NOTE — TELEPHONE ENCOUNTER
Pt is requesting a call back  Pt stated that the following medication-- hydrocodone 5-325 mg was sent to the pharmacy incorrectly and insurance will not cover it

## 2020-08-04 NOTE — TELEPHONE ENCOUNTER
Returned call to patient she left a VM in response to note in Epic of today for pain medication   Reports postoperative surgical pain , chronic pain in bilateral knees , legs and back   SCS not activated until 2 week postoperative visit  Has arthritis in bilateral knees , in discussion with orthopedics for TKA  She asked ortho for knee injection-reinforced she cannot have until after 6 weeks status post resent neurosx      Reports has appointment with ortho this Friday, is not sure how she will get down 14 stairs for apartment secondary to keen pain, she lives with daughter  Advised to discuss with  who attended appointment  To assist with finding more assessable housing  Suggest she complete application with [facility]  Clinic note today excerpt--  Per Joaquin Álvarez, patient can receive 3 more days of hydrocodone from her neurosurgeon  However, if more pain medications are needed, she will need to come to the clinic  If pain is very severe, she may need to go to the ED  Informed patient of note from clinic Dr as above  Script to pharmacy hydrocodone 3 days supply    Patient verbalized understanding and agreement with plan

## 2020-08-04 NOTE — TELEPHONE ENCOUNTER
I spoke to patient, she said she is not sure if she should be on these medications  She said her [address] would know   I called and left a message for DALILA to call back and confirm    DALILA - [phone]

## 2020-08-05 ENCOUNTER — PATIENT OUTREACH (OUTPATIENT)
Dept: INTERNAL MEDICINE CLINIC | Facility: CLINIC | Age: 55
End: 2020-08-05

## 2020-08-05 NOTE — TELEPHONE ENCOUNTER
Telephoned patient left message may need to pay out of pocket when insurance does not cover cost of med   She must have reached max amount for the month  Will discuss with office nurse to check with pharmacy for reason and complete prior authorization paper work if indicated

## 2020-08-05 NOTE — PROGRESS NOTES
CHW reached out to the patient to see if she spoke to her Mental Health provider  CHW also needs to know who is the provider in hopes of saving the patient time by getting her Mental Health Certification signed  At this time there was no answer  CHW will wait to hear from the patient so that her process can be completed

## 2020-08-05 NOTE — TELEPHONE ENCOUNTER
Jenna Kendrick called back and stated the most recent med list she has is from January 2020 and she knows that is not correct   She said she will call patient again and they will try to figure out what she is on and what is needed and call back if she needs something

## 2020-08-05 NOTE — PROGRESS NOTES
CHW called the patient to speak to her about her Mental Health Certification form   CHW explained to the patient that the form must be signed in order for her application to get submitted to [facility]  Patient told CHW that she is in extreme pain and then begin to cry uncontrollably for a while  CHW encouraged the patient to call her Doctor and let him know of her pain  Also patient stated that she is in too much pain to leave her house  CHW told the patient to call her Mental Health Provider to see if she can sign a release with the [facility] for her information needed for [facility], and CHW will take the necessary documents to the Provider  At this time CHW is waiting to hear back from the patient

## 2020-08-05 NOTE — TELEPHONE ENCOUNTER
Contacted pharmacy to [facility]  They provided contact number for pharmacy services  Completed prior auth over the phone       Refill approved - approval notice scanned and can be found in media tab shortly - case # [id number] -     Approval valid until 12/31/2020

## 2020-08-07 ENCOUNTER — OFFICE VISIT (OUTPATIENT)
Dept: OBGYN CLINIC | Facility: CLINIC | Age: 55
End: 2020-08-07
Payer: COMMERCIAL

## 2020-08-07 ENCOUNTER — PATIENT OUTREACH (OUTPATIENT)
Dept: INTERNAL MEDICINE CLINIC | Facility: CLINIC | Age: 55
End: 2020-08-07

## 2020-08-07 DIAGNOSIS — M17.0 PRIMARY LOCALIZED OSTEOARTHRITIS OF BOTH KNEES: Primary | ICD-10-CM

## 2020-08-07 PROCEDURE — 3077F SYST BP >= 140 MM HG: CPT | Performed by: ORTHOPAEDIC SURGERY

## 2020-08-07 PROCEDURE — 99212 OFFICE O/P EST SF 10 MIN: CPT | Performed by: ORTHOPAEDIC SURGERY

## 2020-08-07 PROCEDURE — 3080F DIAST BP >= 90 MM HG: CPT | Performed by: ORTHOPAEDIC SURGERY

## 2020-08-07 PROCEDURE — 1036F TOBACCO NON-USER: CPT | Performed by: ORTHOPAEDIC SURGERY

## 2020-08-07 NOTE — PROGRESS NOTES
CHW called the patient to ask her could we meet about signing a Release of Information for her Mental Health Provider  When CHW arrived patient was a no show  CHW made multiple calls to the patients home for almost 30 minutes before the patient responded  After patient responded CHW was able to obtain necessary signatures, and take  The form to [facility] located in [facility] at [address] [place] [address]  Once arriving CHW asked the  to make a copy of the release form and to return the original back to CHW  CHW then supplied the  with a [address] [place] [address] Certification form and asked if it could be giving to the patients Provider Dr Khris Reyes  All papers were directed to the provider and CHW will remain available to continue to help with the patients transportation needs

## 2020-08-07 NOTE — PROGRESS NOTES
Assessment:  1  Primary localized osteoarthritis of both knees       Patient Active Problem List   Diagnosis    Chronic bilateral low back pain with bilateral sciatica    Right knee pain    Chronic pain syndrome    Lumbar radiculopathy    Lumbar spondylosis    Fibromyalgia    Lumbar disc disease with radiculopathy    Spinal stenosis of lumbar region with neurogenic claudication    Anxiety    Pain in joint, shoulder region    Intractable low back pain    Bilateral hip pain    Bipolar II disorder (HCC)    Cognitive disorder    Esophageal reflux    Fatigue    Female pelvic pain    Generalized anxiety disorder    Hypertension    Hypokalemia    Insomnia    Major depressive disorder, recurrent episode, moderate degree (HCC)    Migraine    Opioid dependence (HCC)    Osteoarthritis of both hips    Osteoarthritis of knee    Overactive bladder    Pain, joint, hip    Panic disorder without agoraphobia    Post traumatic stress disorder    Primary localized osteoarthritis of both knees    Recent unexplained weight loss    Sacroiliitis (HCC)    Tremor    Urinary incontinence    Vitamin D deficiency    Post laminectomy syndrome    Encounter for long-term use of opiate analgesic    Family history of colorectal cancer    Morbid obesity with BMI of 40 0-44 9, adult ([facility] [address] )    Complaint related to dreams    MIMI (obstructive sleep apnea)    Tardive dyskinesia    Primary osteoarthritis of both knees    Patellofemoral disorder of both knees    Rash    Superficial bacterial infection of skin    Scar of back    Impaired skin integrity associated with surgical incision           Plan      Referral to Dr Cory Dutta, Dr Griselda Hernandez to have a 2nd opinion and see if they are willing to do her surgery  I feels that she is too high of a risk to have surgery not to mention her BMI is above 40  I do not feel comfortable doing her total joint replacement              Subjective:     Patient ID:    Chief Complaint:Samantha Alvarez Dress 62 y o  female      HPI    Patient comes in today with regards to bilateral knee arthritis  She has severe arthritis of both knees  She has a long history of medical comorbidities as well  We have tried cortisone injections lubricant injections bracing physical therapy  She has been more than everything at this point all has failed  She wants to know what else can be done        The following portions of the patient's history were reviewed and updated as appropriate: allergies, current medications, past family history, past social history, past surgical history and problem list     All organ systems normal    Social History     Socioeconomic History    Marital status:      Spouse name: Not on file    Number of children: 2    Years of education: graduate school     Highest education level: Not on file   Occupational History    Occupation: Disabled   Social Needs    Financial resource strain: Somewhat hard    Food insecurity     Worry: Not on file     Inability: Not on file    Transportation needs     Medical: Not on file     Non-medical: Not on file   Tobacco Use    Smoking status: Never Smoker    Smokeless tobacco: Never Used   Substance and Sexual Activity    Alcohol use: Not Currently     Comment: 2 x year; being a social drinker as per all scripts     Drug use: No    Sexual activity: Not on file   Lifestyle    Physical activity     Days per week: Not on file     Minutes per session: Not on file    Stress: Not on file   Relationships    Social connections     Talks on phone: Not on file     Gets together: Not on file     Attends Worship service: Not on file     Active member of club or organization: Not on file     Attends meetings of clubs or organizations: Not on file     Relationship status: Not on file    Intimate partner violence     Fear of current or ex partner: Not on file     Emotionally abused: Not on file     Physically abused: Not on file Forced sexual activity: Not on file   Other Topics Concern    Not on file   Social History Narrative    Bereavement    Daily caffeine consumption, 6-8 servings per day     as per all scripts    Lives in [place]      Past Medical History:   Diagnosis Date    Acid reflux     Anxiety     RESOLVED: [id number]    Arthritis     Bipolar 2 disorder (HCC)     FOLLOWS WITH PSYCHIATRIST  CONTINUE LAMOTRIGINE; RESOLVED: [id number]    Depression     Familial tremor     both hands    Fibromyalgia     LAST ASSESSED: [id number]    Hearing aid worn     left ear    [place] (hard of hearing)     left ear    Hypertension     Left-sided weakness     Lower back pain     Memory loss of unknown cause     long and short term    Migraine     Overactive bladder     Panic attack     Post traumatic stress disorder     Seasonal allergies     Stroke [facility])     questionable stroke 2009    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    CONT ASA 81 MG DAILY; RESOLVED: [id number]    Urinary incontinence     Wears dentures     partial lower / full upper    Wears glasses      Past Surgical History:   Procedure Laterality Date    BACK SURGERY       SECTION      COLONOSCOPY      RESOLVED: [id number]    EAR SURGERY      HYSTERECTOMY      MYRINGOTOMY W/ TUBES Left     NECK SURGERY  2019    KS CYSTOURETHROSCOPY N/A 2016    Procedure: CYSTOSCOPY, BOTOX INJECTION;  Surgeon: Bob More MD;  [facility];  Service: Gynecology    [place] PERCUT IMPLNT [address] Right 2020    Procedure: INSERTION THORACIC DORSAL COLUMN SPINAL CORD STIMULATOR PERCUTANEOUS W IMPLANTABLE PULSE GENERATOR, RIGHT;  Surgeon: Lynn Ochoa MD;  Location:  MAIN OR;  Service: Neurosurgery    [address] [place] [address]     No Known Allergies  Current Outpatient Medications on File Prior to Visit   Medication Sig Dispense Refill    amLODIPine (NORVASC) 5 mg tablet Take 1 tablet (5 mg total) by mouth daily 90 tablet 3    busPIRone (BUSPAR) 5 mg tablet TAKE 1 TABLET (5 MG TOTAL) BY MOUTH 3 (THREE) TIMES A DAY 90 tablet 0    cholecalciferol (VITAMIN D3) 1,000 units tablet Take 1 tablet (1,000 Units total) by mouth daily 90 tablet 5    diclofenac sodium (VOLTAREN) 1 % APPLY 4 G TOPICALLY 4 (FOUR) TIMES A  g 0    DULoxetine (CYMBALTA) 30 mg delayed release capsule Take 30 mg by mouth daily      DULoxetine (CYMBALTA) 60 mg delayed release capsule Take 1 capsule (60 mg total) by mouth daily 90 capsule 0    HYDROcodone-acetaminophen (NORCO) 5-325 mg per tablet Take by mouth as needed for pain 1 tab every 4 hours moderate or 2 tabs every 6 hours severe 24 tablet 0    hydrOXYzine HCL (ATARAX) 50 mg tablet Take 1 tablet (50 mg total) by mouth daily at bedtime as needed for anxiety (insomnia) 90 tablet 2    lidocaine (LMX) 4 % cream Apply topically as needed for mild pain (Patient not taking: Reported on 7/27/2020) 30 g 0    LORazepam (ATIVAN) 0 5 mg tablet Take 0 5 mg by mouth every 8 (eight) hours as needed      morphine (MS CONTIN) 15 mg 12 hr tablet Take 1 tablet (15 mg total) by mouth 2 (two) times a dayMax Daily Amount: 30 mg 60 tablet 0    naloxone (NARCAN) 4 mg/0 1 mL nasal spray Administer 1 spray into a nostril  If breathing does not return to normal or if breathing difficulty resumes after 2-3 minutes, give another dose in the other nostril using a new spray   1 each 1    omeprazole (PriLOSEC) 20 mg delayed release capsule Take 1 capsule (20 mg total) by mouth daily 90 capsule 3    oxybutynin (DITROPAN) 5 mg tablet Take 1 tablet (5 mg total) by mouth 2 (two) times a day 180 tablet 3    prazosin (MINIPRESS) 2 mg capsule TAKE 1 CAPSULE BY MOUTH EVERY DAY 30 capsule 1    pregabalin (LYRICA) 150 mg capsule Take 1 capsule (150 mg total) by mouth 3 (three) times a day 90 capsule 2    propranolol (INDERAL) 20 mg tablet Take 1 tablet (20 mg total) by mouth 2 (two) times a day 180 tablet 3    QUEtiapine (SEROquel XR) 300 mg 24 hr tablet Take 1 tablet (300 mg total) by mouth daily at bedtime 60 tablet 1    traZODone (DESYREL) 150 mg tablet Take 1 tablet (150 mg total) by mouth daily at bedtime 30 tablet 1    triamcinolone (KENALOG) 0 025 % cream Apply topically 2 (two) times a day 30 g 0    Valbenazine Tosylate (Ingrezza) 80 MG CAPS Take 80 mg by mouth daily       No current facility-administered medications on file prior to visit  Objective:        Ortho Exam    Severe valgus deformity right knee  No 3 minutes no ecchymosis but mild warmth with palpation  Patient does have tangential thinking cannot focus on the conversation does have a tendency to go off subject frequently  Portions of the record may have been created with voice recognition software   Occasional wrong word or "sound a like" substitutions may have occurred due to the inherent limitations of voice recognition software   Read the chart carefully and recognize, using context, where substitutions have occurred

## 2020-08-10 ENCOUNTER — TELEPHONE (OUTPATIENT)
Dept: NEUROSURGERY | Facility: CLINIC | Age: 55
End: 2020-08-10

## 2020-08-11 ENCOUNTER — OFFICE VISIT (OUTPATIENT)
Dept: NEUROSURGERY | Facility: CLINIC | Age: 55
End: 2020-08-11

## 2020-08-11 VITALS
TEMPERATURE: 97.8 F | SYSTOLIC BLOOD PRESSURE: 133 MMHG | WEIGHT: 210 LBS | DIASTOLIC BLOOD PRESSURE: 95 MMHG | HEIGHT: 61 IN | BODY MASS INDEX: 39.65 KG/M2 | RESPIRATION RATE: 16 BRPM | HEART RATE: 64 BPM

## 2020-08-11 DIAGNOSIS — Z96.89 STATUS POST INSERTION OF SPINAL CORD STIMULATOR: ICD-10-CM

## 2020-08-11 DIAGNOSIS — G89.4 CHRONIC PAIN SYNDROME: Primary | ICD-10-CM

## 2020-08-11 PROCEDURE — 99024 POSTOP FOLLOW-UP VISIT: CPT | Performed by: NURSE PRACTITIONER

## 2020-08-11 PROCEDURE — 3080F DIAST BP >= 90 MM HG: CPT | Performed by: NURSE PRACTITIONER

## 2020-08-11 PROCEDURE — 3008F BODY MASS INDEX DOCD: CPT | Performed by: NURSE PRACTITIONER

## 2020-08-11 PROCEDURE — 3075F SYST BP GE 130 - 139MM HG: CPT | Performed by: NURSE PRACTITIONER

## 2020-08-11 NOTE — PROGRESS NOTES
Assessment/Plan:    Status post insertion of spinal cord stimulator  · As detailed in HPI  · Presents 2 weeks postop for aftercare following  · Incisions thoracolumbar and right buttock Incisions with 4 0 running monocryl suture, incisions are  clean, dry and intact, without erythema, dehiscence, drainage or s/s of infection, healing well approximated wound edges  · Cut knots on lateral ends of monocryl sutures and removed excess nonadherent glue, she tolerated procedure well  ·  Surgical glue is adherent to incision line covering scabbing and surrounding skin  · incision healing as expected for SCS activation and generator system charging, product rep present  · Refer to photo attachments of incisions  The following instructions are reviewed in detail and continue thru next 4 weeks (6 week post op)    · Monocryl  sutures can take up to 90 days to dissolve  ·  At 2 weeks postop can resume restricted medications such as ASA, products containing ASA, NSAID, fish oils, and OTC products or as previously directed  ·  Resume driving 2 week postoperatively  · Continue observing incisions for redness, drainage, swelling dehiscence, increased pain, fever >/= 101, warmth to touch incision or skin surrounding, if occur call or report to office immediately for reassessment  · Continue showers using clean towel and wash cloth with OTC antibacterial body wash, pat dry after showering continue protocol for an additional 2 weeks  · Do not rub incisions allow shower water to flow over incision for brief moment only  · Do not remove glue from incisions; adherent to incision line covering scabs will eventually disappear  Allow water to briefly run over incisions, do not rub incisions    · Do not apply lotions, creams, powder, or ointments to surgical incisions  · Activity as tolerated, no bending, lifting greater than 10 lbs, turning, stretching, no pulling or pushing  Ambulate  as tolerated, primarily use seated rolling walker     · Refrain from strenuous activity, bending, and twisting back  · No Immersion in water such as swimming, hot tub, or tub bath  · Informed of follow-up appointment in  4 weeks  · If there is any significant change in her neurologic status, call and/or return to the office immediately for reassessment   · Explained in detail care when charging the SCS system generator place a barrier between Incision and , do not apply directly over incision  Always provide clean storage for generator and paz  Discuss with Rep protocol for cleaning generator paz  · Discuss with Rep protocol for cleaning generator paz  · Contact Rep immediately if there is any change in efficacy or concern over SCS system  Contact office if unable to reach Rep or if the reps   intervention does not resolve issue  · Will discuss during 6 week postoperative visit physical therapy need and provide referral if indicated  · She met with product rep for device programing and teaching  Rep reports after session with patient all involved chronic pain areas are covered with spinal Cord Stimulator system, refer to attached session report  · Explained chronic pain relief may not be immediate after reprogramming can take up to 72 hours to feel effect   Chronic pain syndrome  · As detailed in HPI  · Status post insertion spinal cord stimulator  · Denies postoperative pain  · Reports continues with chronic pain in low back and bilateral  Lower extremities, SCS activated today by [employer] Rep  · Explained it can take about 72 hours to fell effect  Subjective:     Patient ID: Mary Jo Killian is a 62 y o  female     HPI   She has a longstanding history of chronic pain syndrome affecting her lower back and both lower extremities   She is status post lumbar surgery, with resultant post laminectomy syndrome from a thoracici lumbar  fusion    She has failed  conservative treatment   She underwent a [employer] trial spinal cord stimulator insertion (while living in [facility])  , reported achieving 50 % efficacy  She was referred to Dr Jennifer Huynh by colleague Dr Miguel Licona for assessment of appropriateness to insert a spinal cord stimulator or IT pain pump  After assessment and evaluation is deemed and appropriate candidate to proceed with insertion of Thoracic Spinal Cord Stimulator  She is scheduled fr surgery 7/28 2020 w/ DR Jennifer Huynh for INSERTION THORACIC DORSAL COLUMN SPINAL CORD STIMULATOR PERCUTANEOUS W IMPLANTABLE PULSE GENERATOR, RIGHT (Right Spine Thoracic)       Impressions and treatment recommendations were discussed in detail with the patient who verbalized understanding, all questions were answered and contact information was given in the event future questions arise  REVIEW OF SYSTEMS  Review of Systems   Constitutional: Negative  Negative for fatigue  HENT: Negative  Eyes: Negative  Respiratory: Negative  Cardiovascular: Negative  Gastrointestinal: Positive for constipation  Endocrine: Negative  Genitourinary: Negative for frequency  Musculoskeletal: Positive for back pain (up/down spine, across lower back radiates into bilateral hips, bilateral buttock, and down bilateral legs/knees down into feet  Evaristo Ingles ), gait problem and joint swelling  Uses walker for assistance   Standing up to walk is getting harder  Leg pain is making it harder to walk  Skin: Positive for wound (surgical incisions)  Allergic/Immunologic: Negative  Neurological: Positive for weakness (bilateral legs, heaviness) and numbness (bilateral legs numbness and tingling )  Negative for headaches (h/o migraines )  Hematological: Bruises/bleeds easily (patient on ASA 81)  Psychiatric/Behavioral: Positive for sleep disturbance (due to pain  )  All other systems reviewed and are negative          Meds/Allergies     Current Outpatient Medications   Medication Sig Dispense Refill    amLODIPine (NORVASC) 5 mg tablet Take 1 tablet (5 mg total) by mouth daily 90 tablet 3    busPIRone (BUSPAR) 5 mg tablet TAKE 1 TABLET (5 MG TOTAL) BY MOUTH 3 (THREE) TIMES A DAY 90 tablet 0    diclofenac sodium (VOLTAREN) 1 % APPLY 4 G TOPICALLY 4 (FOUR) TIMES A  g 0    DULoxetine (CYMBALTA) 30 mg delayed release capsule Take 30 mg by mouth daily      DULoxetine (CYMBALTA) 60 mg delayed release capsule Take 1 capsule (60 mg total) by mouth daily 90 capsule 0    HYDROcodone-acetaminophen (NORCO) 5-325 mg per tablet Take by mouth as needed for pain 1 tab every 4 hours moderate or 2 tabs every 6 hours severe 24 tablet 0    hydrOXYzine HCL (ATARAX) 50 mg tablet Take 1 tablet (50 mg total) by mouth daily at bedtime as needed for anxiety (insomnia) 90 tablet 2    lidocaine (LMX) 4 % cream Apply topically as needed for mild pain 30 g 0    LORazepam (ATIVAN) 0 5 mg tablet Take 0 5 mg by mouth every 8 (eight) hours as needed      omeprazole (PriLOSEC) 20 mg delayed release capsule Take 1 capsule (20 mg total) by mouth daily 90 capsule 3    oxybutynin (DITROPAN) 5 mg tablet Take 1 tablet (5 mg total) by mouth 2 (two) times a day 180 tablet 3    prazosin (MINIPRESS) 2 mg capsule TAKE 1 CAPSULE BY MOUTH EVERY DAY 30 capsule 1    pregabalin (LYRICA) 150 mg capsule Take 1 capsule (150 mg total) by mouth 3 (three) times a day 90 capsule 2    propranolol (INDERAL) 20 mg tablet Take 1 tablet (20 mg total) by mouth 2 (two) times a day 180 tablet 3    QUEtiapine (SEROquel XR) 300 mg 24 hr tablet Take 1 tablet (300 mg total) by mouth daily at bedtime 60 tablet 1    traZODone (DESYREL) 150 mg tablet Take 1 tablet (150 mg total) by mouth daily at bedtime 30 tablet 1    Valbenazine Tosylate (Ingrezza) 80 MG CAPS Take 80 mg by mouth daily      cholecalciferol (VITAMIN D3) 1,000 units tablet Take 1 tablet (1,000 Units total) by mouth daily (Patient not taking: Reported on 8/11/2020) 90 tablet 5    morphine (MS CONTIN) 15 mg 12 hr tablet Take 1 tablet (15 mg total) by mouth 2 (two) times a dayMax Daily Amount: 30 mg (Patient not taking: Reported on 2020) 60 tablet 0    naloxone (NARCAN) 4 mg/0 1 mL nasal spray Administer 1 spray into a nostril  If breathing does not return to normal or if breathing difficulty resumes after 2-3 minutes, give another dose in the other nostril using a new spray  (Patient not taking: Reported on 2020) 1 each 1    triamcinolone (KENALOG) 0 025 % cream Apply topically 2 (two) times a day 30 g 0     No current facility-administered medications for this visit  No Known Allergies    PAST HISTORY    Past Medical History:   Diagnosis Date    Acid reflux     Anxiety     RESOLVED: [id number]    Arthritis     Bipolar 2 disorder (HCC)     FOLLOWS WITH PSYCHIATRIST  CONTINUE LAMOTRIGINE; RESOLVED: [id number]    Depression     Familial tremor     both hands    Fibromyalgia     LAST ASSESSED: [id number]    Hearing aid worn     left ear    [place] (hard of hearing)     left ear    Hypertension     Left-sided weakness     Lower back pain     Memory loss of unknown cause     long and short term    Migraine     Overactive bladder     Panic attack     Post traumatic stress disorder     Seasonal allergies     Stroke [facility])     questionable stroke 2009    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    CONT ASA 81 MG DAILY; RESOLVED: [id number]    Urinary incontinence     Wears dentures     partial lower / full upper    Wears glasses        Past Surgical History:   Procedure Laterality Date    BACK SURGERY       SECTION      COLONOSCOPY      RESOLVED: [id number]    EAR SURGERY      HYSTERECTOMY  2004    MYRINGOTOMY W/ TUBES Left     NECK SURGERY  2019    OR CYSTOURETHROSCOPY N/A 2016    Procedure: CYSTOSCOPY, BOTOX INJECTION;  Surgeon: Shonda Lee MD;  [facility];  Service: Gynecology    OR PERCUT IMPLNT [address] Right 2020 Procedure: INSERTION THORACIC DORSAL COLUMN SPINAL CORD STIMULATOR PERCUTANEOUS W IMPLANTABLE PULSE GENERATOR, RIGHT;  Surgeon: Lynn Ochoa MD;  Location:  MAIN OR;  Service: Neurosurgery    TONSILLECTOMY      TUBAL LIGATION  1986       Social History     Tobacco Use    Smoking status: Never Smoker    Smokeless tobacco: Never Used   Substance Use Topics    Alcohol use: Not Currently     Comment: 2 x year; being a social drinker as per all scripts     Drug use: No       Family History   Problem Relation Age of Onset    Colon cancer Mother     Alzheimer's disease Father     Stroke Father     Colon cancer Brother     Bipolar disorder Brother     Breast cancer Maternal Aunt     Colon cancer Maternal Aunt     Heart disease Other     Diabetes Other     Hypertension Other     Seizures Son     Depression Paternal Grandfather     No Known Problems Sister     No Known Problems Brother     Thyroid disease Neg Hx        The following portions of the patient's history were reviewed and updated as appropriate: allergies, current medications, past family history, past medical history, past social history, past surgical history and problem list       EXAM    Vitals:Blood pressure 133/95, pulse 64, temperature 97 8 °F (36 6 °C), temperature source Tympanic, resp  rate 16, height 5' 1" (1 549 m), weight 95 3 kg (210 lb), not currently breastfeeding  ,Body mass index is 39 68 kg/m²  Physical Exam  Vitals signs and nursing note reviewed  Exam conducted with a chaperone present  Constitutional:       General: She is not in acute distress  Appearance: Normal appearance  She is obese  She is not ill-appearing or diaphoretic  Eyes:      General: No scleral icterus  Right eye: No discharge  Left eye: No discharge  Comments: Corrective lenses   Cardiovascular:      Rate and Rhythm: Normal rate and regular rhythm  Pulses: Normal pulses     Pulmonary:      Effort: Pulmonary effort is normal  No respiratory distress  Musculoskeletal:         General: No deformity  Comments: Stopped forward posture , lordosis, use seated rolling walker  Skin:     General: Skin is warm and dry  Comments: Skin well hydrated on back and buttocks , no longer dry scaly with scratch marks  Neurological:      General: No focal deficit present  Mental Status: She is alert  Cranial Nerves: No cranial nerve deficit     Psychiatric:         Mood and Affect: Mood normal          Behavior: Behavior normal          Neurologic Exam     Motor Exam     Strength   Right quadriceps: 4/5  Right hamstrin/5  Left hamstrin/5  Right glutei: 4/5  Left glutei: 4/5  Left anterior tibial: 4/5  Right posterior tibial: 4/5  Left posterior tibial: 4/5  Right gastroc: 4/5  Left gastroc: 4/5    Gait, Coordination, and Reflexes     Gait  Gait: (wheeled rolling walker, stooped posture )      Imaging Studies

## 2020-08-11 NOTE — ASSESSMENT & PLAN NOTE
· As detailed in HPI  · Presents 2 weeks postop for aftercare following  · Incisions thoracolumbar and right buttock Incisions with 4 0 running monocryl suture, incisions are  clean, dry and intact, without erythema, dehiscence, drainage or s/s of infection, healing well approximated wound edges  · Cut knots on lateral ends of monocryl sutures and removed excess nonadherent glue, she tolerated procedure well  ·  Surgical glue is adherent to incision line covering scabbing and surrounding skin  · incision healing as expected for SCS activation and generator system charging, product rep present  · Refer to photo attachments of incisions  The following instructions are reviewed in detail and continue thru next 4 weeks (6 week post op)    · Monocryl  sutures can take up to 90 days to dissolve  ·  At 2 weeks postop can resume restricted medications such as ASA, products containing ASA, NSAID, fish oils, and OTC products or as previously directed  ·  Resume driving 2 week postoperatively  · Continue observing incisions for redness, drainage, swelling dehiscence, increased pain, fever >/= 101, warmth to touch incision or skin surrounding, if occur call or report to office immediately for reassessment  · Continue showers using clean towel and wash cloth with OTC antibacterial body wash, pat dry after showering continue protocol for an additional 2 weeks  · Do not rub incisions allow shower water to flow over incision for brief moment only  · Do not remove glue from incisions; adherent to incision line covering scabs will eventually disappear  Allow water to briefly run over incisions, do not rub incisions  · Do not apply lotions, creams, powder, or ointments to surgical incisions  · Activity as tolerated, no bending, lifting greater than 10 lbs, turning, stretching, no pulling or pushing  Ambulate  as tolerated, primarily use seated rolling walker     · Refrain from strenuous activity, bending, and twisting back  · No Immersion in water such as swimming, hot tub, or tub bath  · Informed of follow-up appointment in  4 weeks  · If there is any significant change in her neurologic status, call and/or return to the office immediately for reassessment   · Explained in detail care when charging the SCS system generator place a barrier between Incision and , do not apply directly over incision  Always provide clean storage for generator and paz  Discuss with Rep protocol for cleaning generator paz  · Discuss with Rep protocol for cleaning generator paz  · Contact Rep immediately if there is any change in efficacy or concern over SCS system  Contact office if unable to reach Rep or if the reps   intervention does not resolve issue  · Will discuss during 6 week postoperative visit physical therapy need and provide referral if indicated  · She met with product rep for device programing and teaching  Rep reports after session with patient all involved chronic pain areas are covered with spinal Cord Stimulator system, refer to attached session report  · Explained chronic pain relief may not be immediate after reprogramming can take up to 72 hours to feel effect

## 2020-08-11 NOTE — ASSESSMENT & PLAN NOTE
· As detailed in HPI  · Status post insertion spinal cord stimulator  · Denies postoperative pain  · Reports continues with chronic pain in low back and bilateral  Lower extremities, SCS activated today by [employer] Rep  · Explained it can take about 72 hours to fell effect

## 2020-08-13 ENCOUNTER — TELEPHONE (OUTPATIENT)
Dept: OBGYN CLINIC | Facility: MEDICAL CENTER | Age: 55
End: 2020-08-13

## 2020-08-14 ENCOUNTER — OFFICE VISIT (OUTPATIENT)
Dept: OBGYN CLINIC | Facility: MEDICAL CENTER | Age: 55
End: 2020-08-14
Payer: COMMERCIAL

## 2020-08-14 ENCOUNTER — APPOINTMENT (OUTPATIENT)
Dept: RADIOLOGY | Facility: MEDICAL CENTER | Age: 55
End: 2020-08-14
Payer: COMMERCIAL

## 2020-08-14 VITALS
HEIGHT: 61 IN | TEMPERATURE: 99 F | HEART RATE: 70 BPM | DIASTOLIC BLOOD PRESSURE: 84 MMHG | SYSTOLIC BLOOD PRESSURE: 127 MMHG | WEIGHT: 210 LBS | BODY MASS INDEX: 39.65 KG/M2

## 2020-08-14 DIAGNOSIS — M17.0 PRIMARY LOCALIZED OSTEOARTHRITIS OF BOTH KNEES: ICD-10-CM

## 2020-08-14 DIAGNOSIS — M17.11 PRIMARY OSTEOARTHRITIS OF RIGHT KNEE: Primary | ICD-10-CM

## 2020-08-14 PROCEDURE — 1036F TOBACCO NON-USER: CPT | Performed by: ORTHOPAEDIC SURGERY

## 2020-08-14 PROCEDURE — 99214 OFFICE O/P EST MOD 30 MIN: CPT | Performed by: ORTHOPAEDIC SURGERY

## 2020-08-14 PROCEDURE — 3079F DIAST BP 80-89 MM HG: CPT | Performed by: ORTHOPAEDIC SURGERY

## 2020-08-14 PROCEDURE — 3008F BODY MASS INDEX DOCD: CPT | Performed by: ORTHOPAEDIC SURGERY

## 2020-08-14 PROCEDURE — 3074F SYST BP LT 130 MM HG: CPT | Performed by: ORTHOPAEDIC SURGERY

## 2020-08-14 PROCEDURE — 73564 X-RAY EXAM KNEE 4 OR MORE: CPT

## 2020-08-14 NOTE — PROGRESS NOTES
Assessment/Plan     1  Primary osteoarthritis of right knee    2  Primary localized osteoarthritis of both knees      Orders Placed This Encounter   Procedures    XR knee 4+ vw left injury    XR knee 4+ vw right injury    Ambulatory referral to Orthopedic Surgery     · Patient has severe bilateral knee osteoarthritis , worse on the right  · Patient had spinal cord stimulator placed  Advised patient to start weaning off MS Contin 15 mg BID  She understands being on this medication prior to surgery may impact her pain control and overall outcome after surgery  · Provided patient with bilateral knee compression sleeve brace   · She would like to have Right TKA as soon as possible  Patient to follow up with Dr Javier Rivero to discuss surgery  She is aware I will be back from maternity leave mid-November if needed  Return if symptoms worsen or fail to improve, for f/u with Dr Javier Rivero  I answered all of the patient's questions during the visit and provided education of the patient's condition during the visit  The patient verbalized understanding of the information given and agrees with the plan  This note was dictated using [employer] software  It may contain errors including improperly dictated words  Please contact physician directly for any questions  History of Present Illness   Chief complaint:   Chief Complaint   Patient presents with    Left Knee - Pain    Right Knee - Pain       HPI: Samantha Pinto is a 62 y o  female that c/o bilateral knee pain  Patient was referred here today by Dr Damon Eller  She has been treating with Dr Damon Eller and has had bilateral knee CSI on 5/1/2020 with minimal relief  She has also had bilateral knee Euflexxa 3 series, last one on 11/8/2019 with no relief  She also has had physical therapy and aqua therapy in the past with no relief  She is having constant achy sharp pain over generalized bilateral knee, worse on the right  She notes instability and popping bilaterally   Pain is worse with walking, standing, going up and down steps , and increase activity  She is taking Meloxicam, Lyrica and MS Contin 15 mg BID ( per PCP for chronic low back pain)  She is also using Diclofenac gel prn for pain  She is using rollator walker when walking  She does have a history of lumbar fusion in 2018 in [place]  She recently had Spinal Cord  Stimulator devic implanted on 2020 by Dr Anu Valdivia  She is not a diabetic, not a smoker, does not see cardiologist, no history of MRSA, no family hx or self having DVT or PE  She has no history of HEP C or HIV       ROS:    See HPI for musculoskeletal review  All other systems reviewed are negative     Historical Information   Past Medical History:   Diagnosis Date    Acid reflux     Anxiety     RESOLVED: [id number]    Arthritis     Bipolar 2 disorder (HCC)     FOLLOWS WITH PSYCHIATRIST  CONTINUE LAMOTRIGINE; RESOLVED: [id number]    Depression     Familial tremor     both hands    Fibromyalgia     LAST ASSESSED: [id number]    Hearing aid worn     left ear    [place] (hard of hearing)     left ear    Hypertension     Left-sided weakness     Lower back pain     Memory loss of unknown cause     long and short term    Migraine     Overactive bladder     Panic attack     Post traumatic stress disorder     Seasonal allergies     Stroke [facility])     questionable stroke 2009    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    CONT ASA 81 MG DAILY; RESOLVED: [id number]    Urinary incontinence     Wears dentures     partial lower / full upper    Wears glasses      Past Surgical History:   Procedure Laterality Date    BACK SURGERY       SECTION      COLONOSCOPY      RESOLVED:     EAR SURGERY      HYSTERECTOMY      MYRINGOTOMY W/ TUBES Left     NECK SURGERY  2019    MN CYSTOURETHROSCOPY N/A 2016    Procedure: CYSTOSCOPY, BOTOX INJECTION;  Surgeon: Benedict Unger MD;  Location: [facility]; Service: Gynecology    CT PERCUT IMPLNT [address] Right 7/28/2020    Procedure: INSERTION THORACIC DORSAL COLUMN SPINAL CORD STIMULATOR PERCUTANEOUS W IMPLANTABLE PULSE GENERATOR, RIGHT;  Surgeon: Isadora Galo MD;  Location:  MAIN OR;  Service: Neurosurgery   [facility]     Social History   Social History     Substance and Sexual Activity   Alcohol Use Not Currently    Comment: 2 x year; being a social drinker as per all scripts      Social History     Substance and Sexual Activity   Drug Use No     Social History     Tobacco Use   Smoking Status Never Smoker   Smokeless Tobacco Never Used     Family History:   Family History   Problem Relation Age of Onset    Colon cancer Mother     Alzheimer's disease Father     Stroke Father     Colon cancer Brother     Bipolar disorder Brother     Breast cancer Maternal Aunt     Colon cancer Maternal Aunt     Heart disease Other     Diabetes Other     Hypertension Other     Seizures Son     Depression Paternal Grandfather     No Known Problems Sister     No Known Problems Brother     Thyroid disease Neg Hx        Current Outpatient Medications on File Prior to Visit   Medication Sig Dispense Refill    amLODIPine (NORVASC) 5 mg tablet Take 1 tablet (5 mg total) by mouth daily 90 tablet 3    busPIRone (BUSPAR) 5 mg tablet TAKE 1 TABLET (5 MG TOTAL) BY MOUTH 3 (THREE) TIMES A DAY 90 tablet 0    cholecalciferol (VITAMIN D3) 1,000 units tablet Take 1 tablet (1,000 Units total) by mouth daily (Patient not taking: Reported on 8/11/2020) 90 tablet 5    diclofenac sodium (VOLTAREN) 1 % APPLY 4 G TOPICALLY 4 (FOUR) TIMES A  g 0    DULoxetine (CYMBALTA) 30 mg delayed release capsule Take 30 mg by mouth daily      DULoxetine (CYMBALTA) 60 mg delayed release capsule Take 1 capsule (60 mg total) by mouth daily 90 capsule 0    HYDROcodone-acetaminophen (NORCO) 5-325 mg per tablet Take by mouth as needed for pain 1 tab every 4 hours moderate or 2 tabs every 6 hours severe 24 tablet 0    hydrOXYzine HCL (ATARAX) 50 mg tablet Take 1 tablet (50 mg total) by mouth daily at bedtime as needed for anxiety (insomnia) 90 tablet 2    lidocaine (LMX) 4 % cream Apply topically as needed for mild pain 30 g 0    LORazepam (ATIVAN) 0 5 mg tablet Take 0 5 mg by mouth every 8 (eight) hours as needed      morphine (MS CONTIN) 15 mg 12 hr tablet Take 1 tablet (15 mg total) by mouth 2 (two) times a dayMax Daily Amount: 30 mg (Patient not taking: Reported on 8/11/2020) 60 tablet 0    naloxone (NARCAN) 4 mg/0 1 mL nasal spray Administer 1 spray into a nostril  If breathing does not return to normal or if breathing difficulty resumes after 2-3 minutes, give another dose in the other nostril using a new spray  (Patient not taking: Reported on 8/11/2020) 1 each 1    omeprazole (PriLOSEC) 20 mg delayed release capsule Take 1 capsule (20 mg total) by mouth daily 90 capsule 3    oxybutynin (DITROPAN) 5 mg tablet Take 1 tablet (5 mg total) by mouth 2 (two) times a day 180 tablet 3    prazosin (MINIPRESS) 2 mg capsule TAKE 1 CAPSULE BY MOUTH EVERY DAY 30 capsule 1    pregabalin (LYRICA) 150 mg capsule Take 1 capsule (150 mg total) by mouth 3 (three) times a day 90 capsule 2    propranolol (INDERAL) 20 mg tablet Take 1 tablet (20 mg total) by mouth 2 (two) times a day 180 tablet 3    QUEtiapine (SEROquel XR) 300 mg 24 hr tablet Take 1 tablet (300 mg total) by mouth daily at bedtime 60 tablet 1    traZODone (DESYREL) 150 mg tablet Take 1 tablet (150 mg total) by mouth daily at bedtime 30 tablet 1    triamcinolone (KENALOG) 0 025 % cream Apply topically 2 (two) times a day 30 g 0    Valbenazine Tosylate (Ingrezza) 80 MG CAPS Take 80 mg by mouth daily       No current facility-administered medications on file prior to visit        No Known Allergies    Current Outpatient Medications on File Prior to Visit   Medication Sig Dispense Refill    amLODIPine (NORVASC) 5 mg tablet Take 1 tablet (5 mg total) by mouth daily 90 tablet 3    busPIRone (BUSPAR) 5 mg tablet TAKE 1 TABLET (5 MG TOTAL) BY MOUTH 3 (THREE) TIMES A DAY 90 tablet 0    cholecalciferol (VITAMIN D3) 1,000 units tablet Take 1 tablet (1,000 Units total) by mouth daily (Patient not taking: Reported on 8/11/2020) 90 tablet 5    diclofenac sodium (VOLTAREN) 1 % APPLY 4 G TOPICALLY 4 (FOUR) TIMES A  g 0    DULoxetine (CYMBALTA) 30 mg delayed release capsule Take 30 mg by mouth daily      DULoxetine (CYMBALTA) 60 mg delayed release capsule Take 1 capsule (60 mg total) by mouth daily 90 capsule 0    HYDROcodone-acetaminophen (NORCO) 5-325 mg per tablet Take by mouth as needed for pain 1 tab every 4 hours moderate or 2 tabs every 6 hours severe 24 tablet 0    hydrOXYzine HCL (ATARAX) 50 mg tablet Take 1 tablet (50 mg total) by mouth daily at bedtime as needed for anxiety (insomnia) 90 tablet 2    lidocaine (LMX) 4 % cream Apply topically as needed for mild pain 30 g 0    LORazepam (ATIVAN) 0 5 mg tablet Take 0 5 mg by mouth every 8 (eight) hours as needed      morphine (MS CONTIN) 15 mg 12 hr tablet Take 1 tablet (15 mg total) by mouth 2 (two) times a dayMax Daily Amount: 30 mg (Patient not taking: Reported on 8/11/2020) 60 tablet 0    naloxone (NARCAN) 4 mg/0 1 mL nasal spray Administer 1 spray into a nostril  If breathing does not return to normal or if breathing difficulty resumes after 2-3 minutes, give another dose in the other nostril using a new spray   (Patient not taking: Reported on 8/11/2020) 1 each 1    omeprazole (PriLOSEC) 20 mg delayed release capsule Take 1 capsule (20 mg total) by mouth daily 90 capsule 3    oxybutynin (DITROPAN) 5 mg tablet Take 1 tablet (5 mg total) by mouth 2 (two) times a day 180 tablet 3    prazosin (MINIPRESS) 2 mg capsule TAKE 1 CAPSULE BY MOUTH EVERY DAY 30 capsule 1    pregabalin (LYRICA) 150 mg capsule Take 1 capsule (150 mg total) by mouth 3 (three) times a day 90 capsule 2    propranolol (INDERAL) 20 mg tablet Take 1 tablet (20 mg total) by mouth 2 (two) times a day 180 tablet 3    QUEtiapine (SEROquel XR) 300 mg 24 hr tablet Take 1 tablet (300 mg total) by mouth daily at bedtime 60 tablet 1    traZODone (DESYREL) 150 mg tablet Take 1 tablet (150 mg total) by mouth daily at bedtime 30 tablet 1    triamcinolone (KENALOG) 0 025 % cream Apply topically 2 (two) times a day 30 g 0    Valbenazine Tosylate (Ingrezza) 80 MG CAPS Take 80 mg by mouth daily       No current facility-administered medications on file prior to visit  Objective   Vitals: Blood pressure 127/84, pulse 70, temperature 99 °F (37 2 °C), height 5' 1" (1 549 m), weight 95 3 kg (210 lb), not currently breastfeeding  ,Body mass index is 39 68 kg/m²      PE:  AAOx 3  WDWN  Hearing intact, no drainage from eyes  Regular rate  no audible wheezing  no abdominal distension  LE compartments soft, skin intact    rightknee:    Appearance:  no swelling   No ecchymosis  no obvious joint deformity   No effusion  Active Range of Motion:  AROM: / PROM 0-120   Special Tests:  Valgus Stress Test:  negative  Varus Stress Test:  negative     leftknee:    Appearance:  no swelling   No ecchymosis  no obvious joint deformity   No effusion  Active Range of Motion:  AROM: / PROM 0-120   Special Tests:  Valgus Stress Test:  negative  Varus Stress Test:  negative       Imaging Studies: I have personally reviewed pertinent films in PACS   XR bilateralknees:  Severe DJD     Scribe Attestation    I,:   Lalo Hurt am acting as a scribe while in the presence of the attending physician :        I,:   Rupesh Dockery DO personally performed the services described in this documentation    as scribed in my presence :

## 2020-08-18 ENCOUNTER — OFFICE VISIT (OUTPATIENT)
Dept: OBGYN CLINIC | Facility: MEDICAL CENTER | Age: 55
End: 2020-08-18
Payer: COMMERCIAL

## 2020-08-18 VITALS
BODY MASS INDEX: 39.65 KG/M2 | HEIGHT: 61 IN | DIASTOLIC BLOOD PRESSURE: 80 MMHG | SYSTOLIC BLOOD PRESSURE: 140 MMHG | WEIGHT: 210 LBS | TEMPERATURE: 97.5 F

## 2020-08-18 DIAGNOSIS — M17.11 PRIMARY OSTEOARTHRITIS OF RIGHT KNEE: Primary | ICD-10-CM

## 2020-08-18 DIAGNOSIS — Z01.818 PREOP TESTING: ICD-10-CM

## 2020-08-18 DIAGNOSIS — M17.0 PRIMARY LOCALIZED OSTEOARTHRITIS OF BOTH KNEES: ICD-10-CM

## 2020-08-18 PROCEDURE — 1036F TOBACCO NON-USER: CPT | Performed by: ORTHOPAEDIC SURGERY

## 2020-08-18 PROCEDURE — 3008F BODY MASS INDEX DOCD: CPT | Performed by: ORTHOPAEDIC SURGERY

## 2020-08-18 PROCEDURE — 3008F BODY MASS INDEX DOCD: CPT | Performed by: INTERNAL MEDICINE

## 2020-08-18 PROCEDURE — 99214 OFFICE O/P EST MOD 30 MIN: CPT | Performed by: ORTHOPAEDIC SURGERY

## 2020-08-18 PROCEDURE — 3079F DIAST BP 80-89 MM HG: CPT | Performed by: ORTHOPAEDIC SURGERY

## 2020-08-18 PROCEDURE — 3077F SYST BP >= 140 MM HG: CPT | Performed by: ORTHOPAEDIC SURGERY

## 2020-08-18 RX ORDER — CEFAZOLIN SODIUM 2 G/50ML
2000 SOLUTION INTRAVENOUS ONCE
Status: CANCELLED | OUTPATIENT
Start: 2020-08-18 | End: 2020-08-18

## 2020-08-18 RX ORDER — FERROUS SULFATE TAB EC 324 MG (65 MG FE EQUIVALENT) 324 (65 FE) MG
324 TABLET DELAYED RESPONSE ORAL
Qty: 60 TABLET | Refills: 1 | Status: SHIPPED | OUTPATIENT
Start: 2020-08-18 | End: 2021-03-16 | Stop reason: SDUPTHER

## 2020-08-18 RX ORDER — MULTIVIT-MIN/IRON FUM/FOLIC AC 7.5 MG-4
1 TABLET ORAL DAILY
Qty: 30 TABLET | Refills: 1 | Status: ON HOLD | OUTPATIENT
Start: 2020-08-18 | End: 2021-07-14 | Stop reason: CLARIF

## 2020-08-18 RX ORDER — FOLIC ACID 1 MG/1
1 TABLET ORAL DAILY
Qty: 30 TABLET | Refills: 1 | Status: SHIPPED | OUTPATIENT
Start: 2020-08-18 | End: 2021-03-16 | Stop reason: SDUPTHER

## 2020-08-18 RX ORDER — ASCORBIC ACID 500 MG
500 TABLET ORAL 2 TIMES DAILY
Qty: 60 TABLET | Refills: 1 | Status: SHIPPED | OUTPATIENT
Start: 2020-08-18 | End: 2021-03-17 | Stop reason: SDUPTHER

## 2020-08-18 RX ORDER — CHLORHEXIDINE GLUCONATE 0.12 MG/ML
15 RINSE ORAL ONCE
Status: CANCELLED | OUTPATIENT
Start: 2020-08-18 | End: 2020-08-18

## 2020-08-18 RX ORDER — CHLORHEXIDINE GLUCONATE 4 G/100ML
SOLUTION TOPICAL DAILY PRN
Status: CANCELLED | OUTPATIENT
Start: 2020-08-18

## 2020-08-18 NOTE — PROGRESS NOTES
Orthopaedic Surgery Note    CC: Bilateral Knee Pain      HPI:  Ms Samantha Maloney is a 62 y  o female with a history of bilateral knee pain  Patient has been receiving treatment for bilateral knee osteoarthritis  Right greater than left  Patient reports severe constant pain diffusely about bilateral knees  Patient is very distressed about her pain  She states she is not able to complete daily activities independently due to her pain  She notes instability and weakness  Patient has tried bilateral knee steroid injections (last on 5/1/20), bilateral knee visco supplementation injections, and PT with no improvement in her pain  She ambulates with a rollator walker  She has used oral meds - tylenol and NSAIds with minimal help  Previously saw Dr Rusty Petersen and Dr Héctor Cadena and referred to myself regarding possible R TKA  Of note, history of spinal cord stimulator 3 weeks ago  Prior hx stroke  She does have balance issues as well  Significant back pain        ALLERGIES:  No Known Allergies    CURRENT MEDICATIONS:  Current Outpatient Medications   Medication Sig Dispense Refill    busPIRone (BUSPAR) 5 mg tablet TAKE 1 TABLET (5 MG TOTAL) BY MOUTH 3 (THREE) TIMES A DAY 90 tablet 0    diclofenac sodium (VOLTAREN) 1 % APPLY 4 G TOPICALLY 4 (FOUR) TIMES A  g 0    DULoxetine (CYMBALTA) 30 mg delayed release capsule Take 30 mg by mouth daily      DULoxetine (CYMBALTA) 60 mg delayed release capsule Take 1 capsule (60 mg total) by mouth daily 90 capsule 0    HYDROcodone-acetaminophen (NORCO) 5-325 mg per tablet Take by mouth as needed for pain 1 tab every 4 hours moderate or 2 tabs every 6 hours severe 24 tablet 0    hydrOXYzine HCL (ATARAX) 50 mg tablet Take 1 tablet (50 mg total) by mouth daily at bedtime as needed for anxiety (insomnia) 90 tablet 2    lidocaine (LMX) 4 % cream Apply topically as needed for mild pain 30 g 0    LORazepam (ATIVAN) 0 5 mg tablet Take 0 5 mg by mouth every 8 (eight) hours as needed      prazosin (MINIPRESS) 2 mg capsule TAKE 1 CAPSULE BY MOUTH EVERY DAY 30 capsule 1    pregabalin (LYRICA) 150 mg capsule Take 1 capsule (150 mg total) by mouth 3 (three) times a day 90 capsule 2    QUEtiapine (SEROquel XR) 300 mg 24 hr tablet Take 1 tablet (300 mg total) by mouth daily at bedtime 60 tablet 1    triamcinolone (KENALOG) 0 025 % cream Apply topically 2 (two) times a day 30 g 0    Valbenazine Tosylate (Ingrezza) 80 MG CAPS Take 80 mg by mouth daily      amLODIPine (NORVASC) 5 mg tablet Take 1 tablet (5 mg total) by mouth daily 90 tablet 3    cholecalciferol (VITAMIN D3) 1,000 units tablet Take 1 tablet (1,000 Units total) by mouth daily (Patient not taking: Reported on 8/11/2020) 90 tablet 5    morphine (MS CONTIN) 15 mg 12 hr tablet Take 1 tablet (15 mg total) by mouth 2 (two) times a dayMax Daily Amount: 30 mg (Patient not taking: Reported on 8/11/2020) 60 tablet 0    naloxone (NARCAN) 4 mg/0 1 mL nasal spray Administer 1 spray into a nostril  If breathing does not return to normal or if breathing difficulty resumes after 2-3 minutes, give another dose in the other nostril using a new spray  (Patient not taking: Reported on 8/11/2020) 1 each 1    omeprazole (PriLOSEC) 20 mg delayed release capsule Take 1 capsule (20 mg total) by mouth daily 90 capsule 3    oxybutynin (DITROPAN) 5 mg tablet Take 1 tablet (5 mg total) by mouth 2 (two) times a day 180 tablet 3    propranolol (INDERAL) 20 mg tablet Take 1 tablet (20 mg total) by mouth 2 (two) times a day 180 tablet 3    traZODone (DESYREL) 150 mg tablet Take 1 tablet (150 mg total) by mouth daily at bedtime 30 tablet 1     No current facility-administered medications for this visit  PAST MEDICAL HISTORY  Past Medical History:   Diagnosis Date    Acid reflux     Anxiety     RESOLVED: [id number]    Arthritis     Bipolar 2 disorder (HCC)     FOLLOWS WITH PSYCHIATRIST   CONTINUE LAMOTRIGINE; RESOLVED: [id number]    Depression  Familial tremor     both hands    Fibromyalgia     LAST ASSESSED: [id number]    Hearing aid worn     left ear    [place] (hard of hearing)     left ear    Hypertension     Left-sided weakness     Lower back pain     Memory loss of unknown cause     long and short term    Migraine     Overactive bladder     Panic attack     Post traumatic stress disorder     Seasonal allergies     Stroke [facility])     questionable stroke 2009    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    CONT ASA 81 MG DAILY; RESOLVED: [id number]    Urinary incontinence     Wears dentures     partial lower / full upper    Wears glasses        SURGICAL HISTORY  Past Surgical History:   Procedure Laterality Date    BACK SURGERY       SECTION      COLONOSCOPY      RESOLVED: [id number]    EAR SURGERY      HYSTERECTOMY  2004    MYRINGOTOMY W/ TUBES Left     NECK SURGERY  2019    FL CYSTOURETHROSCOPY N/A 2016    Procedure: CYSTOSCOPY, BOTOX INJECTION;  Surgeon: Vijaya Boo MD;  Location: [facility];  Service: Gynecology    FL PERCUT IMPLNT [address] Right 2020    Procedure: INSERTION THORACIC DORSAL COLUMN SPINAL CORD STIMULATOR PERCUTANEOUS W IMPLANTABLE PULSE GENERATOR, RIGHT;  Surgeon: David Shearer MD;  Location:  MAIN OR;  Service: Neurosurgery    TONSILLECTOMY      TUBAL LIGATION         FAMILY HISTORY  Family History   Problem Relation Age of Onset    Colon cancer Mother     Alzheimer's disease Father     Stroke Father     Colon cancer Brother     Bipolar disorder Brother     Breast cancer Maternal Aunt     Colon cancer Maternal Aunt     Heart disease Other     Diabetes Other     Hypertension Other     Seizures Son     Depression Paternal Grandfather     No Known Problems Sister     No Known Problems Brother     Thyroid disease Neg Hx        SOCIAL HISTORY  Social History     Socioeconomic History    Marital status:      Spouse name: Not on file    Number of children: 2    Years of education: graduate school     Highest education level: Not on file   Occupational History    Occupation: Disabled   Social Needs    Financial resource strain: Somewhat hard   Kira[employer] insecurity     Worry: Not on file     Inability: Not on file   [employer] needs     Medical: Not on file     Non-medical: Not on file   Tobacco Use    Smoking status: Never Smoker    Smokeless tobacco: Never Used   Substance and Sexual Activity    Alcohol use: Not Currently     Comment: 2 x year; being a social drinker as per all scripts     Drug use: No    Sexual activity: Not on file   Lifestyle    Physical activity     Days per week: Not on file     Minutes per session: Not on file    Stress: Not on file   Relationships    Social connections     Talks on phone: Not on file     Gets together: Not on file     Attends Temple service: Not on file     Active member of club or organization: Not on file     Attends meetings of clubs or organizations: Not on file     Relationship status: Not on file    Intimate partner violence     Fear of current or ex partner: Not on file     Emotionally abused: Not on file     Physically abused: Not on file     Forced sexual activity: Not on file   Other Topics Concern    Not on file   Social History Narrative    Bereavement    Daily caffeine consumption, 6-8 servings per day     as per all scripts    Lives in [address] [place] [address]          Review of Systems   Metal Allergy: no  History of MRSA: no  History of DVT or PE: no  Active dental issues: no  Anesthesia complications: no    Patient indicated positive for hay fever, excessive weight gain, hearing loss, heartburn,   Otherwise negative except per above and HPI  Physical Exam    Vitals  Vitals:    08/18/20 1027   BP: 140/80   Temp: 97 5 °F (36 4 °C)       BMI  Body mass index is 39 68 kg/m²  GENERAL: No acute distress  Alert and oriented  Well nourished and well hydrated  Appears stated age  HEENT : Normocephalic, atraumatic  Extraocular movements intact  Mask in place  NECK: Supple, trachea midline  LUNGS: Adequate and symmetric respiratory effort  No intercostal retractions or accessory muscle use  HEART: Extremities warm and perfused  ABDOMEN: Nondistended  SKIN: Warm and dry, no rash  Right Knee   Inspection/Appearance:       Swelling: No      Patella is midline  Alignment:  Knee is in significant valgus  Palpation - Soft Tissue: normal without effusion  ROM:       Extension - 10          Flexion - 110 (pain)      extensor lag: no    Stability:  demonstrates no varus, valgus, anterior drawer or posterior drawer  Patella: stable, tracks normally  Sensation Intact to Light Touch in Sural, Saphenous, Tibial, Superficial Peroneal, and Deep Peroneal Nerve Distribution  Motor function 5 out of 5 strength in Tibialis Anterior, Gastrocnemius, Soleus, Extensor Hallucis Longus, and Flexor Hallucis Longus Muscles  Extremity Warm and Well Perfused  Brisk Capillary Refill in Toes  Imaging  A) Imaging modality available  Radiographs: yes  MRI scan: no  CT scan: no    B) Imaging findings  Subchondral cysts: yes  Subchondral sclerosis: yes  Periarticular osteophytes: yes  Joint subluxation: no  Joint space narrowing: yes  Bone-on-bone articulations: yes  Avascular necrosis: no      Assessment and Plan  Right Knee Arthritis    We discussed the role of total joint replacement  All non-surgical and surgical treatment options were discussed  I reviewed the procedure in detail  All of the risks, benefits, pro's and con's of total joint arthroplasty were discussed    All complications were discussed pre-operatively including but not limited to pain, infection, scar, stiffness, bleeding, blood loss, need for transfusion, neurovascular injury, implant failure, fracture, DVT, PE, MI, CVA, limb length inequality, dislocation, loss of limb, loss of life, need for additional surgery  All of the patient's questions were answered  2   DVT Prophylaxis: Patient is at higher than normal risk for VTE so in addition to early ambulation and mechanical squeezers we will add chemical prophylaxis   eliquis 2 5mg bid    3  Antimicrobial Prophylaxis: The patient will be given Ancef perioperatively to decrease the risk of infection    4  Risk of bleeding: We will use TXA perioperatively to minimize the risk of bleeding  5   Cardiovascular: The patient is on a beta blocker that needs to be taken in the preoperative period    6  Discharge Planning:  The planned discharge is to SNF    7  Medical Conditions:   - spinal cord stimulator (requires clearance from NSU)  - balance issues and tremor and history of stroke (requires eval from neurology preop)  - medical clearance from pcp  - chronic narcotics (patietn states they dont help and wants to wean off, agree, will place pain mgmt order to help her wean off)    Will plan for medical comgmt in hospital            Ayden Velasco MD  Adult Reconstruction Surgery  Department of [place] [address]  10:49 AM

## 2020-08-19 ENCOUNTER — DOCUMENTATION (OUTPATIENT)
Dept: NEUROSURGERY | Facility: CLINIC | Age: 55
End: 2020-08-19

## 2020-08-19 ENCOUNTER — PATIENT OUTREACH (OUTPATIENT)
Dept: INTERNAL MEDICINE CLINIC | Facility: CLINIC | Age: 55
End: 2020-08-19

## 2020-08-19 NOTE — PROGRESS NOTES
[facility]               I have emailed [employer] Rep Liane Torres to contact your office regarding spinal cord stimulator and upcomming orthopedic surgery, to discuss their role in on surgery day  Patient has a 6 week postoperative visit with neurosurgeon Dr Kyle Perrin 17 2020, Dr can add note regarding surgery clearance for orthopedic surgery at that time  Fax form to [phone] risa Velasquez    Previous Messages     ----- Message -----   From: River Tiwari   Sent: 8/18/2020   2:38 PM EDT   To: [facility]   Subject: ADVICE ON CLEARANCE, STIMULATOR                   Eva: Mela Sebastian is scheduled for a total knee replacement with Dr Luke Carroll on 10/7/20  Prior he will need a neurosurgical clearance and advice on her stimulator  She has an appointment scheduled as a post op with Dr Joshua Pineda on 9/17/20  Would you be able to address these requests at that visit, or should another be scheduled? I have a form to fax to you for clearance and will need your fax #       Look forward to your response regarding this upcoming surgery   I left you a voice mail on this, but decided it may be easier to message you  Virgilio Reyes for your help  Antonio Rose

## 2020-08-19 NOTE — PROGRESS NOTES
CHW attempted to call the patient to see if she had received her Mental Health Certification forms back from [facility]  At this time there was no answer

## 2020-08-20 ENCOUNTER — PATIENT OUTREACH (OUTPATIENT)
Dept: INTERNAL MEDICINE CLINIC | Facility: CLINIC | Age: 55
End: 2020-08-20

## 2020-08-20 NOTE — PROGRESS NOTES
Patient called CHW to let CHW know that the patients Mental Health Certification form was filled out by [facility] and that CHW could pick it up  CHW asked the patient to call to confirm that it has not been mailed out yet, and if not, CHW will pick it up today so that the patients whole application can be submitted to [employer] for review  CHW retrieved all necessary paper work from [address] [place] [address] at [address] [place] [address] and will be submitted the patients application  Patient [employer] application has been submitted to [employer]

## 2020-08-21 ENCOUNTER — PATIENT OUTREACH (OUTPATIENT)
Dept: INTERNAL MEDICINE CLINIC | Facility: CLINIC | Age: 55
End: 2020-08-21

## 2020-08-21 NOTE — PROGRESS NOTES
Outpatient Care Management Note:    Called and spoke with patient  She reports she is doing "ok" at this time  Patient reports she now has her spinal cord stimulator and reports her legs are feeling a bit better  Patient reports she will be having knee pain and difficulty ambulating and will be getting a total knee replacement in October  Patient will need PCP and Neurosurgery clearance  I did ask patient if she will be going to short term rehab after her knee placement  Patient adamant that she does not go to a nursing home for rehab  Patient reports having bad experiences and trauma with getting her hair cut off at a nursing home  I asked patient if she would be agreeable to have visiting nurses and physical therapy come to the home  She reports it is her daughter's home and does not want anyone else coming into the home  I did make her aware of the rehab floor at the hospital but no guarantee she would be accepted or that there will be a bed available  Patient would be open to the rehab [facility]) at the hospital  I encouraged patient to talk with her daughter and to have her present at next ortho appointment  Patient lives in the 2nd floor of her daughters house and does have steps to enter the home and to get to her living quarters  Patient reports her daughter has 4 children and works and cannot also be available  I reminded patient we wanted her to be safe and have the necessary care that she will need  Patient does voice that she wants to get off her pain medication  Patient last received Morphine from PCP office on 7/27/20  Patient did receive Percocet 24 tablets after her spinal cord stimulator was placed  Patient did not ask for any further pain medication today  Patient encouraged to schedule an appointment with pain management  Patient also reports she was planning on calling [facility]'s weight management office as she did not follow up with COVID pandemic   Patient has contact number and reports she wants to loose weight  Patient reports she has all her medication at this time and taking as prescribed  I did review her upcoming appointments with her including PCP on 9/9 @ 3:30 pm  Patient is currently using her rides with her insurance to get to medical appointments  She did submit new [employer] application and did get mental health portion completed and waiting to see if she is approved  Patient is following with mental health with [facility]  Patient does not have any further questions, concerns, or other needs at this time  Pt has my contact # and PCP office # if needed  Pt is agreeable for further outreach

## 2020-08-24 DIAGNOSIS — M54.16 LUMBAR RADICULOPATHY: ICD-10-CM

## 2020-08-24 RX ORDER — PREGABALIN 150 MG/1
150 CAPSULE ORAL 3 TIMES DAILY
Qty: 90 CAPSULE | Refills: 2 | OUTPATIENT
Start: 2020-08-24

## 2020-08-25 ENCOUNTER — TELEPHONE (OUTPATIENT)
Dept: NEUROSURGERY | Facility: CLINIC | Age: 55
End: 2020-08-25

## 2020-08-25 ENCOUNTER — TELEPHONE (OUTPATIENT)
Dept: INTERNAL MEDICINE CLINIC | Facility: CLINIC | Age: 55
End: 2020-08-25

## 2020-08-25 ENCOUNTER — TELEPHONE (OUTPATIENT)
Dept: OBGYN CLINIC | Facility: MEDICAL CENTER | Age: 55
End: 2020-08-25

## 2020-08-25 NOTE — TELEPHONE ENCOUNTER
I got on phone with patient to assist with the call  Patient was upset stating we aren't going to help her  I asked her if she had contacted the neurosurgeon who placed her spinal cord stimulator for the leg pain and said the stimulator will take a while to work  I asked her if she called them and if they instructed her to follow up with her PCP, but patient said "I know you aren't going to help me "  I offered her an appointment, but patient stated she did not want to talk any longer and she hung up the phone

## 2020-08-25 NOTE — TELEPHONE ENCOUNTER
Returned dayne reports has heaviness in legs --is awaiting TKR bilateral , admits this heaviness is likely secondary to  Knee problem  At night in bed has pain on right and left sides, cannot finc a comfortable lying position, having problem lying on back since SCS sx  She notified [employer] , programing change from A-B ,  Reports efficacy for relief of low back and thigh pain form 9/10-7/10 --admits SCS is helping her  Reports has fallen a few timed since SCs surgery  Today dropped her SCS controller it went behind the bed and she cannot reach it, tried to get it but almost fell again  Will need to wait until someone is in the home to help her  Continues with pain in knees and legs while doing stairs, pending knee surgery  She was satisfied with conversation      Status post surgery 7/28/20 INSERTION THORACIC DORSAL COLUMN SPINAL CORD STIMULATOR PERCUTANEOUS W IMPLANTABLE PULSE GENERATOR, RIGHT (Right Spine Thoracic)

## 2020-08-25 NOTE — TELEPHONE ENCOUNTER
Patient called me and left a voice mail, is very difficult to understand  I returned call and said she may have called regarding her low back pain  Asked for a return call to me and left #

## 2020-08-25 NOTE — TELEPHONE ENCOUNTER
Phone call transferred to me  Spoke with patient  Patient states that she is experiencing hip, buttocks, and leg pain on  both sides  Patient has been experiencing this pain for months but states that now is severe  Patient rated her pain a 9 or a 10 when she is laying down  Patient was asked if she sees a Dr for her pain and she said she sees Dr benson and Dr MEJÍA [facility] [place]  Her chart did not show any providers with those names  Trever Apple got on the phone with patient

## 2020-08-25 NOTE — TELEPHONE ENCOUNTER
Patient called in complaining of excruciating pain on the side of her hip ,cant sleep requested to speak with nurse or doctor transferred her to [address]

## 2020-08-26 ENCOUNTER — TELEPHONE (OUTPATIENT)
Dept: NEUROLOGY | Facility: CLINIC | Age: 55
End: 2020-08-26

## 2020-08-28 ENCOUNTER — TELEPHONE (OUTPATIENT)
Dept: OBGYN CLINIC | Facility: MEDICAL CENTER | Age: 55
End: 2020-08-28

## 2020-08-28 NOTE — TELEPHONE ENCOUNTER
Dr Wilfred Mcbride,     The case request for this patient is in, however the OR is requesting you put the equipment and vendor in please  They will not schedule it without that information      Thank you !!

## 2020-08-31 ENCOUNTER — TELEPHONE (OUTPATIENT)
Dept: INTERNAL MEDICINE CLINIC | Facility: CLINIC | Age: 55
End: 2020-08-31

## 2020-08-31 ENCOUNTER — TELEPHONE (OUTPATIENT)
Dept: OBGYN CLINIC | Facility: HOSPITAL | Age: 55
End: 2020-08-31

## 2020-08-31 NOTE — TELEPHONE ENCOUNTER
Folder Color-  Orange    Name of Form- Medical Evaluation Form    Form to be filled out by- Dr Lashonda Nur    Form to be picked up by patient  Patient made aware of 10 business day policy  *Patient has an upcoming appointment on 9/9/20 with Dr Lashonda Nur, I explained to the patient that she is coming in for pre-op clearance and that the doctor may or may not be able to complete this form and that she may need another appointment if the provider is unable to complete at that visit patient understands

## 2020-08-31 NOTE — TELEPHONE ENCOUNTER
Dr Eugenio Mason's concern after the surgery, they are trying to get the patient into personal care  For PostOp she will need PT and her daughter is nervous about a therapist coming into the home  Also, her apt is on the 2nd floor and she will have difficulty with the stairs  Can she do inpatient rehab?      Miguel Angel Armstrong # [phone]

## 2020-09-01 ENCOUNTER — PATIENT OUTREACH (OUTPATIENT)
Dept: INTERNAL MEDICINE CLINIC | Facility: CLINIC | Age: 55
End: 2020-09-01

## 2020-09-01 NOTE — PROGRESS NOTES
SW has returned call to pt who is requesting a referral for an ICM to [facility]  Pt relates she needs this referral due to needing assistance with Personal Care Placement  Pt does relate her dgt and she realizes she needs more help on a regular basis  She would like to do this before she has knee surgery  SW did indicate the pt should pursue rehab after this surgery  HIRAM did attempt to call [address] her [address] through [address] and her cell # [phone]  SW left a message for her to return SW call  If referral needed SW will direct pt to go through her [address] Provider with [facility] and [facility]  Pt relates she does not want NH Placement and that the Transitional Housing program she was in before was too physically demanding for her  SW will f/u with [facility] and pt as indicted  ADDENDUM:  9/2/20  Another call placed to [address]  @ [phone] but had to leave a message  HIRAM also called Cas[facility] at [facility] [address] [phone] to see if he can help with the ICM referral request   He returned call and reported SW should speak with Mr [place]  [phone] at the [address] and [facility] address  HIRAM did call and left a message there for a rturn call  SW to F/U as indicated

## 2020-09-03 ENCOUNTER — PATIENT OUTREACH (OUTPATIENT)
Dept: INTERNAL MEDICINE CLINIC | Facility: CLINIC | Age: 55
End: 2020-09-03

## 2020-09-03 NOTE — PROGRESS NOTES
SW has returned call to [address] [phone] of [facility] and [facility] she relates they are referring pt to [facility] ICM program because pt had been with them before and they hope she will get an ICM quicker that way  Their 2 Programs can work together  She related she is helping pt do her MA 51/ AAA assessment for Personal Care placement  She will let SW know the out come  She has helped  Pt to advocate to the Orthopedic Surgeon the pt may need short term SNF/rehab  She hoped pt can go from rehab to her [facility] Placement  Pt is asking for Personal Care Placement  She has indicated family not able to meet all needs due to dgt having several small children, set up of home and COVID /Privacy concerns  Pt does not let program participants  go into her home  SW did advise Hua Baugh our CHW is attempting to help pt  With a [employer] new application ( service previously denied) and the SW has called  [address] [phone] for help with needed ICM referral    Call returned from Dai Kenyon from [phone] who reports he is aware of request and is working with Payam  SW/Cm and Team to remain avavailbe to assist as indicted

## 2020-09-04 ENCOUNTER — TELEPHONE (OUTPATIENT)
Dept: INTERNAL MEDICINE CLINIC | Facility: CLINIC | Age: 55
End: 2020-09-04

## 2020-09-04 ENCOUNTER — APPOINTMENT (EMERGENCY)
Dept: CT IMAGING | Facility: HOSPITAL | Age: 55
DRG: 392 | End: 2020-09-04
Payer: COMMERCIAL

## 2020-09-04 ENCOUNTER — APPOINTMENT (EMERGENCY)
Dept: RADIOLOGY | Facility: HOSPITAL | Age: 55
DRG: 392 | End: 2020-09-04
Payer: COMMERCIAL

## 2020-09-04 ENCOUNTER — TRANSCRIBE ORDERS (OUTPATIENT)
Dept: ADMINISTRATIVE | Facility: HOSPITAL | Age: 55
End: 2020-09-04

## 2020-09-04 ENCOUNTER — HOSPITAL ENCOUNTER (INPATIENT)
Facility: HOSPITAL | Age: 55
LOS: 4 days | Discharge: NURSING, REHAB OR LONG-TERM CARE | DRG: 392 | End: 2020-09-09
Attending: EMERGENCY MEDICINE | Admitting: INTERNAL MEDICINE
Payer: COMMERCIAL

## 2020-09-04 ENCOUNTER — APPOINTMENT (OUTPATIENT)
Dept: LAB | Facility: HOSPITAL | Age: 55
DRG: 392 | End: 2020-09-04
Attending: ORTHOPAEDIC SURGERY
Payer: COMMERCIAL

## 2020-09-04 DIAGNOSIS — G47.00 INSOMNIA: ICD-10-CM

## 2020-09-04 DIAGNOSIS — F31.81 BIPOLAR II DISORDER (HCC): ICD-10-CM

## 2020-09-04 DIAGNOSIS — G89.4 CHRONIC PAIN DISORDER: ICD-10-CM

## 2020-09-04 DIAGNOSIS — K21.9 GASTROESOPHAGEAL REFLUX DISEASE WITHOUT ESOPHAGITIS: ICD-10-CM

## 2020-09-04 DIAGNOSIS — M25.569 KNEE PAIN: ICD-10-CM

## 2020-09-04 DIAGNOSIS — Z01.818 PREOP TESTING: ICD-10-CM

## 2020-09-04 DIAGNOSIS — Z01.818 OTHER SPECIFIED PRE-OPERATIVE EXAMINATION: ICD-10-CM

## 2020-09-04 DIAGNOSIS — R53.1 ASTHENIA: ICD-10-CM

## 2020-09-04 DIAGNOSIS — Z01.818 OTHER SPECIFIED PRE-OPERATIVE EXAMINATION: Primary | ICD-10-CM

## 2020-09-04 DIAGNOSIS — R26.2 AMBULATORY DYSFUNCTION: Primary | ICD-10-CM

## 2020-09-04 DIAGNOSIS — M54.16 LUMBAR RADICULOPATHY: ICD-10-CM

## 2020-09-04 DIAGNOSIS — M17.11 PRIMARY OSTEOARTHRITIS OF RIGHT KNEE: ICD-10-CM

## 2020-09-04 DIAGNOSIS — R13.10 DYSPHAGIA: ICD-10-CM

## 2020-09-04 LAB
ALBUMIN SERPL BCP-MCNC: 3.4 G/DL (ref 3.5–5)
ALP SERPL-CCNC: 121 U/L (ref 46–116)
ALT SERPL W P-5'-P-CCNC: 17 U/L (ref 12–78)
ANION GAP SERPL CALCULATED.3IONS-SCNC: 8 MMOL/L (ref 4–13)
APTT PPP: 33 SECONDS (ref 23–37)
AST SERPL W P-5'-P-CCNC: 12 U/L (ref 5–45)
ATRIAL RATE: 58 BPM
BASOPHILS # BLD AUTO: 0.03 THOUSANDS/ΜL (ref 0–0.1)
BASOPHILS NFR BLD AUTO: 1 % (ref 0–1)
BILIRUB SERPL-MCNC: 0.88 MG/DL (ref 0.2–1)
BUN SERPL-MCNC: 14 MG/DL (ref 5–25)
CALCIUM SERPL-MCNC: 8.3 MG/DL (ref 8.3–10.1)
CHLORIDE SERPL-SCNC: 104 MMOL/L (ref 100–108)
CO2 SERPL-SCNC: 31 MMOL/L (ref 21–32)
CREAT SERPL-MCNC: 0.94 MG/DL (ref 0.6–1.3)
D DIMER PPP FEU-MCNC: 0.62 UG/ML FEU
EOSINOPHIL # BLD AUTO: 0.29 THOUSAND/ΜL (ref 0–0.61)
EOSINOPHIL NFR BLD AUTO: 8 % (ref 0–6)
ERYTHROCYTE [DISTWIDTH] IN BLOOD BY AUTOMATED COUNT: 13.8 % (ref 11.6–15.1)
EST. AVERAGE GLUCOSE BLD GHB EST-MCNC: 97 MG/DL
FERRITIN SERPL-MCNC: 52 NG/ML (ref 8–388)
GFR SERPL CREATININE-BSD FRML MDRD: 78 ML/MIN/1.73SQ M
GLUCOSE P FAST SERPL-MCNC: 98 MG/DL (ref 65–99)
HBA1C MFR BLD: 5 %
HCT VFR BLD AUTO: 41.4 % (ref 34.8–46.1)
HGB BLD-MCNC: 13.4 G/DL (ref 11.5–15.4)
IMM GRANULOCYTES # BLD AUTO: 0 THOUSAND/UL (ref 0–0.2)
IMM GRANULOCYTES NFR BLD AUTO: 0 % (ref 0–2)
INR PPP: 1.09 (ref 0.84–1.19)
IRON SATN MFR SERPL: 26 %
IRON SERPL-MCNC: 77 UG/DL (ref 50–170)
LYMPHOCYTES # BLD AUTO: 1.41 THOUSANDS/ΜL (ref 0.6–4.47)
LYMPHOCYTES NFR BLD AUTO: 38 % (ref 14–44)
MCH RBC QN AUTO: 28.6 PG (ref 26.8–34.3)
MCHC RBC AUTO-ENTMCNC: 32.4 G/DL (ref 31.4–37.4)
MCV RBC AUTO: 88 FL (ref 82–98)
MONOCYTES # BLD AUTO: 0.33 THOUSAND/ΜL (ref 0.17–1.22)
MONOCYTES NFR BLD AUTO: 9 % (ref 4–12)
NEUTROPHILS # BLD AUTO: 1.62 THOUSANDS/ΜL (ref 1.85–7.62)
NEUTS SEG NFR BLD AUTO: 44 % (ref 43–75)
NRBC BLD AUTO-RTO: 0 /100 WBCS
NT-PROBNP SERPL-MCNC: 57 PG/ML
P AXIS: 51 DEGREES
PLATELET # BLD AUTO: 251 THOUSANDS/UL (ref 149–390)
PMV BLD AUTO: 9.5 FL (ref 8.9–12.7)
POTASSIUM SERPL-SCNC: 3.1 MMOL/L (ref 3.5–5.3)
PR INTERVAL: 130 MS
PROT SERPL-MCNC: 7.2 G/DL (ref 6.4–8.2)
PROTHROMBIN TIME: 13.9 SECONDS (ref 11.6–14.5)
QRS AXIS: 93 DEGREES
QRSD INTERVAL: 92 MS
QT INTERVAL: 420 MS
QTC INTERVAL: 412 MS
RBC # BLD AUTO: 4.69 MILLION/UL (ref 3.81–5.12)
SARS-COV-2 RNA RESP QL NAA+PROBE: NEGATIVE
SODIUM SERPL-SCNC: 143 MMOL/L (ref 136–145)
T WAVE AXIS: 3 DEGREES
TIBC SERPL-MCNC: 295 UG/DL (ref 250–450)
TROPONIN I SERPL-MCNC: <0.02 NG/ML
VENTRICULAR RATE: 58 BPM
WBC # BLD AUTO: 3.68 THOUSAND/UL (ref 4.31–10.16)

## 2020-09-04 PROCEDURE — 93005 ELECTROCARDIOGRAM TRACING: CPT

## 2020-09-04 PROCEDURE — 85025 COMPLETE CBC W/AUTO DIFF WBC: CPT

## 2020-09-04 PROCEDURE — 86901 BLOOD TYPING SEROLOGIC RH(D): CPT | Performed by: ORTHOPAEDIC SURGERY

## 2020-09-04 PROCEDURE — 99285 EMERGENCY DEPT VISIT HI MDM: CPT

## 2020-09-04 PROCEDURE — 71275 CT ANGIOGRAPHY CHEST: CPT

## 2020-09-04 PROCEDURE — 97530 THERAPEUTIC ACTIVITIES: CPT

## 2020-09-04 PROCEDURE — 99219 PR INITIAL OBSERVATION CARE/DAY 50 MINUTES: CPT | Performed by: FAMILY MEDICINE

## 2020-09-04 PROCEDURE — 93010 ELECTROCARDIOGRAM REPORT: CPT | Performed by: INTERNAL MEDICINE

## 2020-09-04 PROCEDURE — 87635 SARS-COV-2 COVID-19 AMP PRB: CPT | Performed by: PHYSICIAN ASSISTANT

## 2020-09-04 PROCEDURE — 83550 IRON BINDING TEST: CPT

## 2020-09-04 PROCEDURE — 84484 ASSAY OF TROPONIN QUANT: CPT | Performed by: PHYSICIAN ASSISTANT

## 2020-09-04 PROCEDURE — 83036 HEMOGLOBIN GLYCOSYLATED A1C: CPT

## 2020-09-04 PROCEDURE — 83540 ASSAY OF IRON: CPT

## 2020-09-04 PROCEDURE — G1004 CDSM NDSC: HCPCS

## 2020-09-04 PROCEDURE — 85379 FIBRIN DEGRADATION QUANT: CPT | Performed by: PHYSICIAN ASSISTANT

## 2020-09-04 PROCEDURE — 83880 ASSAY OF NATRIURETIC PEPTIDE: CPT | Performed by: PHYSICIAN ASSISTANT

## 2020-09-04 PROCEDURE — 86900 BLOOD TYPING SEROLOGIC ABO: CPT | Performed by: ORTHOPAEDIC SURGERY

## 2020-09-04 PROCEDURE — 85730 THROMBOPLASTIN TIME PARTIAL: CPT

## 2020-09-04 PROCEDURE — 99285 EMERGENCY DEPT VISIT HI MDM: CPT | Performed by: PHYSICIAN ASSISTANT

## 2020-09-04 PROCEDURE — 36415 COLL VENOUS BLD VENIPUNCTURE: CPT

## 2020-09-04 PROCEDURE — 71046 X-RAY EXAM CHEST 2 VIEWS: CPT

## 2020-09-04 PROCEDURE — 97163 PT EVAL HIGH COMPLEX 45 MIN: CPT

## 2020-09-04 PROCEDURE — 80053 COMPREHEN METABOLIC PANEL: CPT

## 2020-09-04 PROCEDURE — 85610 PROTHROMBIN TIME: CPT

## 2020-09-04 PROCEDURE — 96374 THER/PROPH/DIAG INJ IV PUSH: CPT

## 2020-09-04 PROCEDURE — 82728 ASSAY OF FERRITIN: CPT

## 2020-09-04 PROCEDURE — 86850 RBC ANTIBODY SCREEN: CPT | Performed by: ORTHOPAEDIC SURGERY

## 2020-09-04 RX ORDER — HYDROXYZINE HYDROCHLORIDE 25 MG/1
50 TABLET, FILM COATED ORAL
Status: DISCONTINUED | OUTPATIENT
Start: 2020-09-04 | End: 2020-09-09 | Stop reason: HOSPADM

## 2020-09-04 RX ORDER — ONDANSETRON 2 MG/ML
4 INJECTION INTRAMUSCULAR; INTRAVENOUS EVERY 6 HOURS PRN
Status: DISCONTINUED | OUTPATIENT
Start: 2020-09-04 | End: 2020-09-09 | Stop reason: HOSPADM

## 2020-09-04 RX ORDER — PANTOPRAZOLE SODIUM 40 MG/1
40 TABLET, DELAYED RELEASE ORAL
Status: DISCONTINUED | OUTPATIENT
Start: 2020-09-05 | End: 2020-09-06

## 2020-09-04 RX ORDER — PRAZOSIN HYDROCHLORIDE 2 MG/1
2 CAPSULE ORAL
Status: DISCONTINUED | OUTPATIENT
Start: 2020-09-04 | End: 2020-09-09 | Stop reason: HOSPADM

## 2020-09-04 RX ORDER — BUSPIRONE HYDROCHLORIDE 5 MG/1
5 TABLET ORAL 3 TIMES DAILY
Status: DISCONTINUED | OUTPATIENT
Start: 2020-09-04 | End: 2020-09-09 | Stop reason: HOSPADM

## 2020-09-04 RX ORDER — LORAZEPAM 1 MG/1
0.5 TABLET ORAL EVERY 8 HOURS PRN
Status: DISCONTINUED | OUTPATIENT
Start: 2020-09-04 | End: 2020-09-09 | Stop reason: HOSPADM

## 2020-09-04 RX ORDER — OXYBUTYNIN CHLORIDE 5 MG/1
5 TABLET ORAL 2 TIMES DAILY
Status: DISCONTINUED | OUTPATIENT
Start: 2020-09-04 | End: 2020-09-09 | Stop reason: HOSPADM

## 2020-09-04 RX ORDER — OXYCODONE HYDROCHLORIDE 5 MG/1
2.5 TABLET ORAL EVERY 4 HOURS PRN
Status: DISCONTINUED | OUTPATIENT
Start: 2020-09-04 | End: 2020-09-09 | Stop reason: HOSPADM

## 2020-09-04 RX ORDER — FERROUS SULFATE 325(65) MG
325 TABLET ORAL
Status: DISCONTINUED | OUTPATIENT
Start: 2020-09-05 | End: 2020-09-09 | Stop reason: HOSPADM

## 2020-09-04 RX ORDER — ASCORBIC ACID 500 MG
500 TABLET ORAL 2 TIMES DAILY
Status: DISCONTINUED | OUTPATIENT
Start: 2020-09-04 | End: 2020-09-09 | Stop reason: HOSPADM

## 2020-09-04 RX ORDER — KETOROLAC TROMETHAMINE 30 MG/ML
15 INJECTION, SOLUTION INTRAMUSCULAR; INTRAVENOUS ONCE
Status: COMPLETED | OUTPATIENT
Start: 2020-09-04 | End: 2020-09-04

## 2020-09-04 RX ORDER — ACETAMINOPHEN 325 MG/1
975 TABLET ORAL EVERY 8 HOURS SCHEDULED
Status: DISCONTINUED | OUTPATIENT
Start: 2020-09-04 | End: 2020-09-09 | Stop reason: HOSPADM

## 2020-09-04 RX ORDER — PREGABALIN 75 MG/1
150 CAPSULE ORAL 3 TIMES DAILY
Status: DISCONTINUED | OUTPATIENT
Start: 2020-09-04 | End: 2020-09-09 | Stop reason: HOSPADM

## 2020-09-04 RX ORDER — POTASSIUM CHLORIDE 20 MEQ/1
20 TABLET, EXTENDED RELEASE ORAL ONCE
Status: COMPLETED | OUTPATIENT
Start: 2020-09-04 | End: 2020-09-04

## 2020-09-04 RX ORDER — QUETIAPINE 300 MG/1
300 TABLET, FILM COATED, EXTENDED RELEASE ORAL
Status: DISCONTINUED | OUTPATIENT
Start: 2020-09-04 | End: 2020-09-09 | Stop reason: HOSPADM

## 2020-09-04 RX ORDER — HYDROMORPHONE HCL/PF 1 MG/ML
0.2 SYRINGE (ML) INJECTION EVERY 4 HOURS PRN
Status: DISCONTINUED | OUTPATIENT
Start: 2020-09-04 | End: 2020-09-06

## 2020-09-04 RX ORDER — OXYCODONE HYDROCHLORIDE 5 MG/1
5 TABLET ORAL EVERY 4 HOURS PRN
Status: DISCONTINUED | OUTPATIENT
Start: 2020-09-04 | End: 2020-09-06

## 2020-09-04 RX ORDER — DULOXETIN HYDROCHLORIDE 60 MG/1
60 CAPSULE, DELAYED RELEASE ORAL DAILY
Status: DISCONTINUED | OUTPATIENT
Start: 2020-09-05 | End: 2020-09-09 | Stop reason: HOSPADM

## 2020-09-04 RX ORDER — NAPROXEN 250 MG/1
250 TABLET ORAL 2 TIMES DAILY WITH MEALS
Status: DISCONTINUED | OUTPATIENT
Start: 2020-09-05 | End: 2020-09-06

## 2020-09-04 RX ORDER — DULOXETIN HYDROCHLORIDE 30 MG/1
30 CAPSULE, DELAYED RELEASE ORAL DAILY
Status: DISCONTINUED | OUTPATIENT
Start: 2020-09-05 | End: 2020-09-09 | Stop reason: HOSPADM

## 2020-09-04 RX ORDER — AMOXICILLIN 250 MG
1 CAPSULE ORAL 2 TIMES DAILY
Status: DISCONTINUED | OUTPATIENT
Start: 2020-09-04 | End: 2020-09-09 | Stop reason: HOSPADM

## 2020-09-04 RX ORDER — MELATONIN
1000 DAILY
Status: DISCONTINUED | OUTPATIENT
Start: 2020-09-05 | End: 2020-09-09 | Stop reason: HOSPADM

## 2020-09-04 RX ORDER — PROPRANOLOL HYDROCHLORIDE 20 MG/1
20 TABLET ORAL 2 TIMES DAILY
Status: DISCONTINUED | OUTPATIENT
Start: 2020-09-04 | End: 2020-09-09 | Stop reason: HOSPADM

## 2020-09-04 RX ORDER — MORPHINE SULFATE 15 MG/1
15 TABLET ORAL EVERY 12 HOURS
Status: DISCONTINUED | OUTPATIENT
Start: 2020-09-04 | End: 2020-09-09 | Stop reason: HOSPADM

## 2020-09-04 RX ORDER — LIDOCAINE 50 MG/G
1 PATCH TOPICAL DAILY
Status: DISCONTINUED | OUTPATIENT
Start: 2020-09-05 | End: 2020-09-09 | Stop reason: HOSPADM

## 2020-09-04 RX ORDER — AMLODIPINE BESYLATE 5 MG/1
5 TABLET ORAL DAILY
Status: DISCONTINUED | OUTPATIENT
Start: 2020-09-05 | End: 2020-09-09 | Stop reason: HOSPADM

## 2020-09-04 RX ORDER — POLYETHYLENE GLYCOL 3350 17 G/17G
17 POWDER, FOR SOLUTION ORAL DAILY PRN
Status: DISCONTINUED | OUTPATIENT
Start: 2020-09-04 | End: 2020-09-09 | Stop reason: HOSPADM

## 2020-09-04 RX ADMIN — KETOROLAC TROMETHAMINE 15 MG: 30 INJECTION, SOLUTION INTRAMUSCULAR at 11:15

## 2020-09-04 RX ADMIN — MORPHINE SULFATE 15 MG: 15 TABLET ORAL at 18:04

## 2020-09-04 RX ADMIN — PREGABALIN 150 MG: 75 CAPSULE ORAL at 22:15

## 2020-09-04 RX ADMIN — OXYCODONE HYDROCHLORIDE AND ACETAMINOPHEN 500 MG: 500 TABLET ORAL at 20:52

## 2020-09-04 RX ADMIN — ACETAMINOPHEN 975 MG: 325 TABLET ORAL at 22:25

## 2020-09-04 RX ADMIN — PRAZOSIN HYDROCHLORIDE 2 MG: 2 CAPSULE ORAL at 22:23

## 2020-09-04 RX ADMIN — QUETIAPINE FUMARATE 300 MG: 300 TABLET, EXTENDED RELEASE ORAL at 22:23

## 2020-09-04 RX ADMIN — SENNOSIDES AND DOCUSATE SODIUM 1 TABLET: 8.6; 5 TABLET ORAL at 20:53

## 2020-09-04 RX ADMIN — TRAZODONE HYDROCHLORIDE 150 MG: 100 TABLET ORAL at 22:15

## 2020-09-04 RX ADMIN — BUSPIRONE HYDROCHLORIDE 5 MG: 5 TABLET ORAL at 22:14

## 2020-09-04 RX ADMIN — OXYBUTYNIN CHLORIDE 5 MG: 5 TABLET ORAL at 20:53

## 2020-09-04 RX ADMIN — POTASSIUM CHLORIDE 20 MEQ: 1500 TABLET, EXTENDED RELEASE ORAL at 11:00

## 2020-09-04 RX ADMIN — IOHEXOL 100 ML: 350 INJECTION, SOLUTION INTRAVENOUS at 12:25

## 2020-09-04 RX ADMIN — PROPRANOLOL HYDROCHLORIDE 20 MG: 20 TABLET ORAL at 20:53

## 2020-09-04 RX ADMIN — OXYCODONE HYDROCHLORIDE 5 MG: 5 TABLET ORAL at 21:03

## 2020-09-04 NOTE — ED PROVIDER NOTES
History  Chief Complaint   Patient presents with    Difficulty Walking     Pt  reports she is going to have right knee surgery in october  Pt  reports she cant walk   Shortness of Breath     Pt  reports SOB with walking  Patient presents emergency department with ambulatory dysfunction  She is scheduled to have surgery for her right knee in October  She was here the hospital for preadmission testing - was having increased difficulty walking and was having shortness of breath while walking  She has had this as a problem but it has been getting worse over the past week  Patient has been working with her doctors trying to get her to and in patient care facility because she is unable to do the stairs at her house and does have help at home and did not want anyone coming into the house to help her- due to [place] and there is a new born baby at the house- grand kid  B/L knee pain and feels legs are weak - no new injury  Prior to Admission Medications   Prescriptions Last Dose Informant Patient Reported? Taking?    DULoxetine (CYMBALTA) 30 mg delayed release capsule  Self Yes No   Sig: Take 30 mg by mouth daily   DULoxetine (CYMBALTA) 60 mg delayed release capsule  Self No No   Sig: Take 1 capsule (60 mg total) by mouth daily   HYDROcodone-acetaminophen (NORCO) 5-325 mg per tablet   No No   Sig: Take by mouth as needed for pain 1 tab every 4 hours moderate or 2 tabs every 6 hours severe   LORazepam (ATIVAN) 0 5 mg tablet  Self Yes No   Sig: Take 0 5 mg by mouth every 8 (eight) hours as needed   Multiple Vitamins-Minerals (multivitamin with minerals) tablet   No No   Sig: Take 1 tablet by mouth daily   QUEtiapine (SEROquel XR) 300 mg 24 hr tablet  Self No No   Sig: Take 1 tablet (300 mg total) by mouth daily at bedtime   Valbenazine Tosylate (Ingrezza) 80 MG CAPS  Self Yes No   Sig: Take 80 mg by mouth daily   amLODIPine (NORVASC) 5 mg tablet   No No   Sig: Take 1 tablet (5 mg total) by mouth daily ascorbic acid (VITAMIN C) 500 MG tablet   No No   Sig: Take 1 tablet (500 mg total) by mouth 2 (two) times a day   busPIRone (BUSPAR) 5 mg tablet  Self No No   Sig: TAKE 1 TABLET (5 MG TOTAL) BY MOUTH 3 (THREE) TIMES A DAY   cholecalciferol (VITAMIN D3) 1,000 units tablet   No No   Sig: Take 1 tablet (1,000 Units total) by mouth daily   Patient not taking: Reported on 8/11/2020   diclofenac sodium (VOLTAREN) 1 %   No No   Sig: APPLY 4 G TOPICALLY 4 (FOUR) TIMES A DAY   ferrous sulfate 324 (65 Fe) mg   No No   Sig: Take 1 tablet (324 mg total) by mouth 2 (two) times a day before meals   folic acid (FOLVITE) 1 mg tablet   No No   Sig: Take 1 tablet (1 mg total) by mouth daily   hydrOXYzine HCL (ATARAX) 50 mg tablet  Self No No   Sig: Take 1 tablet (50 mg total) by mouth daily at bedtime as needed for anxiety (insomnia)   lidocaine (LMX) 4 % cream  Self No No   Sig: Apply topically as needed for mild pain   morphine (MS CONTIN) 15 mg 12 hr tablet   No No   Sig: Take 1 tablet (15 mg total) by mouth 2 (two) times a dayMax Daily Amount: 30 mg   Patient not taking: Reported on 8/11/2020   naloxone (NARCAN) 4 mg/0 1 mL nasal spray   No No   Sig: Administer 1 spray into a nostril  If breathing does not return to normal or if breathing difficulty resumes after 2-3 minutes, give another dose in the other nostril using a new spray     Patient not taking: Reported on 8/11/2020   omeprazole (PriLOSEC) 20 mg delayed release capsule   No No   Sig: Take 1 capsule (20 mg total) by mouth daily   oxybutynin (DITROPAN) 5 mg tablet   No No   Sig: Take 1 tablet (5 mg total) by mouth 2 (two) times a day   prazosin (MINIPRESS) 2 mg capsule  Self No No   Sig: TAKE 1 CAPSULE BY MOUTH EVERY DAY   pregabalin (LYRICA) 150 mg capsule  Self No No   Sig: Take 1 capsule (150 mg total) by mouth 3 (three) times a day   propranolol (INDERAL) 20 mg tablet   No No   Sig: Take 1 tablet (20 mg total) by mouth 2 (two) times a day   traZODone (DESYREL) 150 mg tablet   No No   Sig: Take 1 tablet (150 mg total) by mouth daily at bedtime   triamcinolone (KENALOG) 0 025 % cream  Self No No   Sig: Apply topically 2 (two) times a day      Facility-Administered Medications: None       Past Medical History:   Diagnosis Date    Acid reflux     Anxiety     RESOLVED: [id number]    Arthritis     Bipolar 2 disorder (HCC)     FOLLOWS WITH PSYCHIATRIST  CONTINUE LAMOTRIGINE; RESOLVED: [id number]    Depression     Familial tremor     both hands    Fibromyalgia     LAST ASSESSED: [id number]    Hearing aid worn     left ear    [place] (hard of hearing)     left ear    Hypertension     Left-sided weakness     Lower back pain     Memory loss of unknown cause     long and short term    Migraine     Overactive bladder     Panic attack     Post traumatic stress disorder     Seasonal allergies     Stroke [facility])     questionable stroke 2009    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    CONT ASA 81 MG DAILY; RESOLVED: [id number]    Urinary incontinence     Wears dentures     partial lower / full upper    Wears glasses        Past Surgical History:   Procedure Laterality Date    BACK SURGERY       SECTION      COLONOSCOPY      RESOLVED: [id number]    EAR SURGERY      HYSTERECTOMY  2004    MYRINGOTOMY W/ TUBES Left     NECK SURGERY  2019    [place] CYSTOURETHROSCOPY N/A 2016    Procedure: CYSTOSCOPY, BOTOX INJECTION;  Surgeon: Alma Sommers MD;  Location: [facility];  Service: Gynecology    [place] PERCUT IMPLNT Loree Dahiana Right 2020    Procedure: INSERTION THORACIC DORSAL COLUMN SPINAL CORD STIMULATOR PERCUTANEOUS W IMPLANTABLE PULSE GENERATOR, RIGHT;  Surgeon: Tunde Escalona MD;  Location:  MAIN OR;  Service: Neurosurgery    TONSILLECTOMY      TUBAL LIGATION         Family History   Problem Relation Age of Onset    Colon cancer Mother     Alzheimer's disease Father     Stroke Father     Colon cancer Brother     verbal & written discharge instructions given to pt with good understanding Bipolar disorder Brother     Breast cancer Maternal Aunt     Colon cancer Maternal Aunt     Heart disease Other     Diabetes Other     Hypertension Other     Seizures Son     Depression Paternal Grandfather     No Known Problems Sister     No Known Problems Brother     Thyroid disease Neg Hx      I have reviewed and agree with the history as documented  E-Cigarette/Vaping    E-Cigarette Use Never User      E-Cigarette/Vaping Substances    Nicotine No     THC No     CBD No     Flavoring No     Other No     Unknown No      Social History     Tobacco Use    Smoking status: Never Smoker    Smokeless tobacco: Never Used   Substance Use Topics    Alcohol use: Not Currently     Comment: 2 x year; being a social drinker as per all scripts     Drug use: No       Review of Systems   Constitutional: Negative for fever  Respiratory: Positive for shortness of breath  Negative for cough  Cardiovascular: Negative for chest pain and palpitations  Gastrointestinal: Negative  Musculoskeletal: Positive for gait problem and myalgias  Skin: Negative  Neurological: Positive for weakness  Negative for headaches  All other systems reviewed and are negative  Physical Exam  Physical Exam  Vitals signs and nursing note reviewed  Constitutional:       Appearance: She is well-developed  HENT:      Head: Normocephalic and atraumatic  Right Ear: Tympanic membrane and external ear normal       Left Ear: Tympanic membrane and external ear normal    Eyes:      Conjunctiva/sclera: Conjunctivae normal    Neck:      Musculoskeletal: Neck supple  Cardiovascular:      Rate and Rhythm: Normal rate and regular rhythm  Heart sounds: Normal heart sounds  Pulmonary:      Effort: Pulmonary effort is normal       Breath sounds: Normal breath sounds  Abdominal:      General: Bowel sounds are normal       Palpations: Abdomen is soft     Musculoskeletal:      Right knee: She exhibits decreased range of motion  She exhibits no effusion  Tenderness found  Left knee: She exhibits decreased range of motion  She exhibits no effusion  Tenderness found  Lymphadenopathy:      Cervical: No cervical adenopathy  Skin:     General: Skin is warm  Findings: No rash  Neurological:      Mental Status: She is alert  Psychiatric:         Behavior: Behavior normal          Vital Signs  ED Triage Vitals   Temperature Pulse Respirations Blood Pressure SpO2   09/04/20 1004 09/04/20 1004 09/04/20 1004 09/04/20 1004 09/04/20 1004   98 3 °F (36 8 °C) 68 18 120/76 99 %      Temp Source Heart Rate Source Patient Position - Orthostatic VS BP Location FiO2 (%)   09/04/20 1004 09/04/20 1004 09/04/20 1004 09/04/20 1004 --   Temporal Monitor Sitting Right arm       Pain Score       09/04/20 1115       8           Vitals:    09/04/20 1228 09/04/20 1332 09/04/20 1423 09/04/20 1600   BP: 147/74 150/78  120/83   Pulse: 67 62 95 80   Patient Position - Orthostatic VS: Lying Lying  Sitting         Visual Acuity      ED Medications  Medications   potassium chloride (K-DUR,KLOR-CON) CR tablet 20 mEq (20 mEq Oral Given 9/4/20 1100)   ketorolac (TORADOL) injection 15 mg (15 mg Intravenous Given 9/4/20 1115)   iohexol (OMNIPAQUE) 350 MG/ML injection (MULTI-DOSE) 100 mL (100 mL Intravenous Given 9/4/20 1225)       Diagnostic Studies  Results Reviewed     Procedure Component Value Units Date/Time    Novel Coronavirus [facility] [[id number]] Collected:  09/04/20 1559    Lab Status:   In process Specimen:  Nares from Nose Updated:  09/04/20 1608    Troponin I [[id number]]  (Normal) Collected:  09/04/20 1100    Lab Status:  Final result Specimen:  Blood from Arm, Left Updated:  09/04/20 1126     Troponin I <0 02 ng/mL     D-Dimer [[id number]]  (Abnormal) Collected:  09/04/20 1100    Lab Status:  Final result Specimen:  Blood from Arm, Left Updated:  09/04/20 1126     D-Dimer, Quant 0 62 ug/ml FEU     NT-BNP PRO [[id number]]  (Normal) Collected:  09/04/20 0928    Lab Status:  Final result Specimen:  Blood Updated:  09/04/20 1121     NT-proBNP 57 pg/mL                  CTA ED chest PE study   Final Result by Karoline Ledesma MD (09/04 1244)   No acute intrathoracic pathology identified, consistent with x-rays      Specifically, no evidence of pulmonary embolism      Suspected reflux esophagitis      Multilevel thoracolumbar spinal fusion surgery  Scoliosis                     Workstation performed: [id number]         XR chest 2 views   Final Result by Eloina Lockett MD (09/04 1207)      No acute consolidation or congestion            Workstation performed: [id number]                    Procedures  ECG 12 Lead Documentation Only    Date/Time: 9/4/2020 11:07 AM  Performed by: Antonio Peoples PA-C  Authorized by: Antonio Peoples PA-C     Indications / Diagnosis:  HUNTER  ECG reviewed by me, the ED Provider: yes    Patient location:  ED  Previous ECG:     Previous ECG:  Compared to current    Comparison ECG info:  July 8, 20    Similarity:  No change  Rate:     ECG rate:  58    ECG rate assessment: bradycardic    Rhythm:     Rhythm: sinus bradycardia    Ectopy:     Ectopy: none    QRS:     QRS axis:  Normal  Conduction:     Conduction: normal    ST segments:     ST segments:  Normal  T waves:     T waves: flattening      Flattening:  V6 and V3             ED Course  ED Course as of Sep 04 1632   Fri Sep 04, 2020   1040 CBC, CMP and PT/INR done as out pt - Potassium - 3 1 will give kdur here      1042 Call placed to case management  46 PT saw and eval pt feels she is not safe to be Discharged home - unstable and impulsive almost fell multiple x - also discussed this wi [facility] - case management - she is trying to get placement for pt        1551 [facility]  - case management working to get placemetn - looks like she has placement  US AUDIT      Most Recent Value   Initial Alcohol Screen: US AUDIT-C    1   How often do you have a drink containing alcohol?  0 Filed at: 09/04/2020 1005   2  How many drinks containing alcohol do you have on a typical day you are drinking? 0 Filed at: 09/04/2020 1005   3b  FEMALE Any Age, or MALE 65+: How often do you have 4 or more drinks on one occassion? 0 Filed at: 09/04/2020 1005   Audit-C Score  0 Filed at: 09/04/2020 1005            HEART Risk Score      Most Recent Value   Heart Score Risk Calculator   History  0 Filed at: 09/04/2020 1152   ECG  1 Filed at: 09/04/2020 1152   Age  1 Filed at: 09/04/2020 1152   Risk Factors  1 Filed at: 09/04/2020 1152   Troponin  0 Filed at: 09/04/2020 1152   HEART Score  3 Filed at: 09/04/2020 1152            CAMRON/DAST-10      Most Recent Value   How many times in the past year have you    Used an illegal drug or used a prescription medication for non-medical reasons?   Never Filed at: 09/04/2020 1005                    Wells' Criteria for PE      Most Recent Value   Wells' Criteria for PE   Clinical signs and symptoms of DVT  0 Filed at: 09/04/2020 1152   PE is primary diagnosis or equally likely  3 Filed at: 09/04/2020 1152   HR >100  0 Filed at: 09/04/2020 1152   Immobilization at least 3 days or Surgery in the previous 4 weeks  0 Filed at: 09/04/2020 1152   Previous, objectively diagnosed PE or DVT  0 Filed at: 09/04/2020 1152   Hemoptysis  0 Filed at: 09/04/2020 1152   Malignancy with treatment within 6 months or palliative  0 Filed at: 09/04/2020 1152   Wells' Criteria Total  3 Filed at: 09/04/2020 1152                  MDM  Number of Diagnoses or Management Options  Ambulatory dysfunction: established and worsening  Asthenia: new and requires workup  Knee pain: established and worsening     Amount and/or Complexity of Data Reviewed  Clinical lab tests: reviewed  Tests in the radiology section of CPT®: reviewed  Discuss the patient with other providers: yes    Patient Progress  Patient progress: stable        Disposition  Final diagnoses:   Ambulatory dysfunction Knee pain   Asthenia     Time reflects when diagnosis was documented in both MDM as applicable and the Disposition within this note     Time User Action Codes Description Comment    9/4/2020  4:29 PM Estephanie Cho [R26 2] Ambulatory dysfunction     9/4/2020  4:29 PM Estephanie Cho [M25 569] Knee pain     9/4/2020  4:30 PM Estephanie Cho [R53 1] Asthenia       ED Disposition     ED Disposition Condition Date/Time Comment    Admit Stable Fri Sep 4, 2020  4:30 PM Case was discussed with DR Anthony Gandhi and the patient's admission status was agreed to be observation to the service of Dr Vargas Coronel    None         Patient's Medications   Discharge Prescriptions    No medications on file     No discharge procedures on file      PDMP Review       Value Time User    PDMP Reviewed  Yes 8/4/2020  4:26 PM MERCY Flores          ED Provider  Electronically Signed by           Antnoio Peoples PA-C  09/04/20 6165

## 2020-09-04 NOTE — TELEPHONE ENCOUNTER
Folder Color- orange     Name of Form- [facility] clearance form     Form to be filled out by- DR spears     Form to be Faxed (fax number), [phone]    Patient made aware of 10 business day policy

## 2020-09-04 NOTE — ED NOTES
Pt reports taking morphine and norco for pain around 7am  Pt reports continues to have pain  SHAKILA Cho made aware        Angeles Cheng RN  09/04/20 3840

## 2020-09-04 NOTE — H&P
H&P- Samantha Kaplan 1965, 62 y o  female MRN: [id number]    Unit/Bed#: E5 -01 Encounter: [id number]    Primary Care Provider: Juanita Urrutia MD   Date and time admitted to hospital: 9/4/2020 10:06 AM        Tardive dyskinesia  Assessment & Plan  - Valbenazine Tosylate CAPS 80 mg    Urinary incontinence  Assessment & Plan  Continue oxybutynin 5 mg b i d  Insomnia  Assessment & Plan  - continue trazodone 150 mg at bedtime    Hypokalemia  Assessment & Plan  - potassium 3 1 on admission received 40 of K-Dur, monitor electrolytes    Hypertension  Assessment & Plan  - well control continue propranolol 20 mg b i d , prazosin 2 mg daily, amlodipine 5 mg daily    Esophageal reflux  Assessment & Plan  Prior to arrival patient on omeprazole 20 mg will prescribe pantoprazole 40 mg due to formulary    Bipolar II disorder (HCC)  Assessment & Plan  Continue trazodone 150 mg at bedtime, Seroquel 300 mg daily, Cymbalta 90 mg daily, BuSpar 5 mg t i d   - patient will need psychiatry consult given history of psychiatry hospitalization for placement at short-term rehab    [address]  Continue hydroxyzine at bedtime and Ativan 0 5 mg p r n  T i d     Right knee pain  Assessment & Plan  - acute worsening of right knee osteoarthritis will need placement in short-term rehab, plan for surgery in October for knee replacement  - continue morphine 15 mg b i d   - for pain management will add Tylenol 975 Q 8 scheduled, naproxen 250 b i d   With meals, 2 5 mg oxycodone Q for mild pain and 5 mg Q 4 severe pain with Dilaudid 0 5 IV for breakthrough pain    Chronic bilateral low back pain with bilateral sciatica  Assessment & Plan  Continue home dose of morphine tablet 15 mg b i d     * Ambulatory dysfunction  Assessment & Plan  Secondary to worsening of right knee osteoarthritis with plan for knee replacement scheduled in October but in unable to weightbear on the knee and was evaluated by PT in the emergency room and will need short-term rehab placement  - PT and OT evaluation, case management consult for placement        VTE Prophylaxis: Enoxaparin (Lovenox)  / sequential compression device   Code Status:  Level 1 full code  POLST: POLST form is not discussed and not completed at this time  Discussion with family:  None    Anticipated Length of Stay:  Patient will be admitted on an Observation basis with an anticipated length of stay of  > 2 midnights  Justification for Hospital Stay:  Ambulatory dysfunction secondary to severe pain from acute worsening of knee osteoarthritis, evaluated by physical therapy in will need placement in short-term rehab    Total Time for Visit, including Counseling / Coordination of Care: 45 minutes  Greater than 50% of this total time spent on direct patient counseling and coordination of care  Chief Complaint:   Knee pain    History of Present Illness:    Samantha Atkins is a 62 y o  female who presents with knee pain  She reports acute worsening of her knee over the last week and reports today that she was unable to weight bear secondary to pain  She reports some mild associated swelling of the knee joint  She reports today her knee almost gave out while walking on the stairs and had a near fall  Denies any rash  Denies fever and chills  She has extensive psychiatry history including bipolar 2, anxiety, tardive dyskinesia, depression and follows with outpatient psychiatry and did have an inpatient psychiatry admission in the past and will need psychiatry evaluation prior to placement  She denies recent illness  She reports that she lives at home with her daughter and her 4 children  Review of Systems:    Review of Systems   Constitutional: Negative for fatigue and fever  HENT: Negative for congestion  Respiratory: Negative for cough and shortness of breath  Cardiovascular: Negative for chest pain and palpitations     Gastrointestinal: Negative for abdominal pain, constipation, diarrhea, nausea and vomiting  Genitourinary: Negative for difficulty urinating  Musculoskeletal: Positive for joint swelling  Negative for back pain  Knee pain   Skin: Negative for rash  Neurological: Negative for dizziness and headaches  Past Medical and Surgical History:     Past Medical History:   Diagnosis Date    Acid reflux     Anxiety     RESOLVED: [id number]    Arthritis     Bipolar 2 disorder (HCC)     FOLLOWS WITH PSYCHIATRIST  CONTINUE LAMOTRIGINE; RESOLVED: [id number]    Depression     Familial tremor     both hands    Fibromyalgia     LAST ASSESSED: [id number]    Hearing aid worn     left ear    [facility] (hard of hearing)     left ear    Hypertension     Left-sided weakness     Lower back pain     Memory loss of unknown cause     long and short term    Migraine     Overactive bladder     Panic attack     Post traumatic stress disorder     Seasonal allergies     Stroke [facility])     questionable stroke 2009    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    CONT ASA 81 MG DAILY; RESOLVED:     Urinary incontinence     Wears dentures     partial lower / full upper    Wears glasses        Past Surgical History:   Procedure Laterality Date    BACK SURGERY       SECTION      COLONOSCOPY      RESOLVED: [id number]    EAR SURGERY      HYSTERECTOMY      MYRINGOTOMY W/ TUBES Left     NECK SURGERY  2019    AK CYSTOURETHROSCOPY N/A 2016    Procedure: CYSTOSCOPY, BOTOX INJECTION;  Surgeon: Amilcar Phillips MD;  [facility];  Service: Gynecology    AK PERCUT IMPLNT [address] Right 2020    Procedure: INSERTION THORACIC DORSAL COLUMN SPINAL CORD STIMULATOR PERCUTANEOUS W IMPLANTABLE PULSE GENERATOR, RIGHT;  Surgeon: Bayron Loaiza MD;  Location:  MAIN OR;  Service: Neurosurgery    TONSILLECTOMY     [address]       Meds/Allergies:    Prior to Admission medications    Medication Sig Start Date End Date Taking? Authorizing Provider   amLODIPine (NORVASC) 5 mg tablet Take 1 tablet (5 mg total) by mouth daily 3/25/20 9/4/20 Yes Jimmy Gamino MD   busPIRone (BUSPAR) 5 mg tablet TAKE 1 TABLET (5 MG TOTAL) BY MOUTH 3 (THREE) TIMES A DAY 10/7/19  Yes Jimmy Gamino MD   diclofenac sodium (VOLTAREN) 1 % APPLY 4 G TOPICALLY 4 (FOUR) TIMES A DAY 8/5/20  Yes Karin Parra DO   DULoxetine (CYMBALTA) 30 mg delayed release capsule Take 30 mg by mouth daily   Yes Historical Provider, MD   DULoxetine (CYMBALTA) 60 mg delayed release capsule Take 1 capsule (60 mg total) by mouth daily 11/14/19  Yes Jimmy Gamino MD   hydrOXYzine HCL (ATARAX) 50 mg tablet Take 1 tablet (50 mg total) by mouth daily at bedtime as needed for anxiety (insomnia) 3/24/20  Yes Alyssia Bay,    lidocaine (LMX) 4 % cream Apply topically as needed for mild pain 7/16/20  Yes Karin Parra DO   LORazepam (ATIVAN) 0 5 mg tablet Take 0 5 mg by mouth every 8 (eight) hours as needed 11/25/19  Yes Historical Provider, MD   morphine (MS CONTIN) 15 mg 12 hr tablet Take 1 tablet (15 mg total) by mouth 2 (two) times a dayMax Daily Amount: 30 mg 7/27/20  Yes Tammy Greenwood DO   Multiple Vitamins-Minerals (multivitamin with minerals) tablet Take 1 tablet by mouth daily 8/18/20  Yes Alix Chandler MD   naloxone [facility]) 4 mg/0 1 mL nasal spray Administer 1 spray into a nostril  If breathing does not return to normal or if breathing difficulty resumes after 2-3 minutes, give another dose in the other nostril using a new spray   7/27/20  Yes Tammy Greenwood DO   omeprazole (PriLOSEC) 20 mg delayed release capsule Take 1 capsule (20 mg total) by mouth daily 3/25/20 9/4/20 Yes Jimmy Gamino MD   oxybutynin (DITROPAN) 5 mg tablet Take 1 tablet (5 mg total) by mouth 2 (two) times a day 3/25/20 9/4/20 Yes Jimmy Gamino MD   prazosin (MINIPRESS) 2 mg capsule TAKE 1 CAPSULE BY MOUTH EVERY DAY 7/9/20  Yes Kiley Salvador MD   pregabalin (LYRICA) 150 mg capsule Take 1 capsule (150 mg total) by mouth 3 (three) times a day 6/17/20  Yes Bhupendra Yancey DO   propranolol (INDERAL) 20 mg tablet Take 1 tablet (20 mg total) by mouth 2 (two) times a day 3/25/20 9/4/20 Yes Cristian Minaya MD   QUEtiapine (SEROquel XR) 300 mg 24 hr tablet Take 1 tablet (300 mg total) by mouth daily at bedtime 8/23/19  Yes Jesse Pa MD   traZODone (DESYREL) 150 mg tablet Take 1 tablet (150 mg total) by mouth daily at bedtime 3/19/20 9/4/20 Yes Mary Yeh MD   Valbenazine Tosylate (Ingrezza) 80 MG CAPS Take 80 mg by mouth daily 4/20/20  Yes Regina Sofia MD   ascorbic acid (VITAMIN C) 500 MG tablet Take 1 tablet (500 mg total) by mouth 2 (two) times a day  Patient not taking: Reported on 9/4/2020 8/18/20   Lisandro Elaine MD   cholecalciferol (VITAMIN D3) 1,000 units tablet Take 1 tablet (1,000 Units total) by mouth daily  Patient not taking: Reported on 8/11/2020 8/4/20 11/2/20  Cristian Minaya MD   ferrous sulfate 324 (65 Fe) mg Take 1 tablet (324 mg total) by mouth 2 (two) times a day before meals  Patient not taking: Reported on 9/4/2020 8/18/20   Lisandro Elaine MD   folic acid (FOLVITE) 1 mg tablet Take 1 tablet (1 mg total) by mouth daily  Patient not taking: Reported on 9/4/2020 8/18/20   Lisandro Elaine MD   HYDROcodone-acetaminophen [facility]) 5-325 mg per tablet Take by mouth as needed for pain 1 tab every 4 hours moderate or 2 tabs every 6 hours severe  Patient not taking: Reported on 9/4/2020 8/4/20   [facility]   triamcinolone (KENALOG) 0 025 % cream Apply topically 2 (two) times a day  Patient not taking: Reported on 9/4/2020 7/16/20   Boyd Lim DO     I have reviewed home medications with patient personally      Allergies: No Known Allergies    Social History:     Marital Status:    Patient Pre-hospital Living Situation:  Lives at home with daughter in her young children  Patient Pre-hospital Level of Mobility:  Ambulates with walker  Patient Pre-hospital Diet Restrictions:  None  Substance Use History:   Social History     Substance and Sexual Activity   Alcohol Use Not Currently    Comment: 2 x year; being a social drinker as per all scripts      Social History     Tobacco Use   Smoking Status Never Smoker   Smokeless Tobacco Never Used     Social History     Substance and Sexual Activity   Drug Use No       Physical Exam  Constitutional:       Appearance: Normal appearance  She is obese  She is not ill-appearing or diaphoretic  HENT:      Head: Normocephalic and atraumatic  Mouth/Throat:      Mouth: Mucous membranes are moist    Eyes:      General:         Right eye: No discharge  Left eye: No discharge  Neck:      Musculoskeletal: Neck supple  Cardiovascular:      Rate and Rhythm: Normal rate and regular rhythm  Pulses: Normal pulses  Heart sounds: No murmur  Pulmonary:      Effort: Pulmonary effort is normal  No respiratory distress  Breath sounds: Normal breath sounds  Abdominal:      General: Bowel sounds are normal  There is no distension  Palpations: Abdomen is soft  Tenderness: There is no abdominal tenderness  There is no guarding  Musculoskeletal:      Right lower leg: No edema  Left lower leg: No edema  Comments: Right knee with mild swelling, no erythema over joint, pain to palpation to joint line, no effusion appreciated, has restricted range of motion in flexion  Skin:     General: Skin is warm and dry  Neurological:      General: No focal deficit present  Mental Status: She is alert and oriented to person, place, and time     Psychiatric:         Mood and Affect: Mood normal

## 2020-09-04 NOTE — PHYSICAL THERAPY NOTE
PHYSICAL THERAPY EVALUATION          Patient Name: Hung Esteban  VWBOT'Y Date: 2020   PT EVALUATION 2028-4629    62 y o     [id number]    Anxiety disorder due to multiple medical problems [F06 8]    Past Medical History:   Diagnosis Date    Acid reflux     Anxiety     RESOLVED: [id number]    Arthritis     Bipolar 2 disorder (HCC)     FOLLOWS WITH PSYCHIATRIST  CONTINUE LAMOTRIGINE; RESOLVED: [id number]    Depression     Familial tremor     both hands    Fibromyalgia     LAST ASSESSED: [id number]    Hearing aid worn     left ear    [place] (hard of hearing)     left ear    Hypertension     Left-sided weakness     Lower back pain     Memory loss of unknown cause     long and short term    Migraine     Overactive bladder     Panic attack     Post traumatic stress disorder     Seasonal allergies     Stroke [facility])     questionable stroke 2009    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    CONT ASA 81 MG DAILY; RESOLVED: [id number]    Urinary incontinence     Wears dentures     partial lower / full upper    Wears glasses      Past Surgical History:   Procedure Laterality Date    BACK SURGERY       SECTION      COLONOSCOPY      RESOLVED: [id number]    EAR SURGERY      HYSTERECTOMY      MYRINGOTOMY W/ TUBES Left     NECK SURGERY  2019    WI CYSTOURETHROSCOPY N/A 2016    Procedure: CYSTOSCOPY, BOTOX INJECTION;  Surgeon: Dione Noonan MD;  [facility];  Service: Gynecology    [place] PERCUT IMPLNT [address] Right 2020    Procedure: INSERTION THORACIC DORSAL COLUMN SPINAL CORD STIMULATOR PERCUTANEOUS W IMPLANTABLE PULSE GENERATOR, RIGHT;  Surgeon: Mary He MD;  Location:  MAIN OR;  Service: Neurosurgery   [facility]        20 1426   Note Type   Note type Eval/Treat   Pain Assessment   Pain Assessment Tool 0-10   Pain Score 8   Pain Location/Orientation Orientation: Bilateral;Location: Knee   Hospital Pain Intervention(s) Repositioned; Ambulation/increased activity; Emotional support; Rest   Home Living   Type of Home Apartment  (in house)   [address] Two level;Stairs to enter with rails   Bathroom Shower/Tub Tub/shower unit   [address] [place] [address] Dr lam   [address] Other (Comment); Wheelchair-manual  (Rollator; per pt wc is "unusable")   Additional Comments pt reports she lives in a 2nd and 3rd floor apt within a home  [address]  FOS to apt main level where she stays  reports the apt is narrow and sometimes the Rollator does not fit so she furniture walks   Prior Function   Level of [place] Independent with ADLs and functional mobility; Needs assistance with IADLs   Lives With Family;Daughter  (grandsons, CLARENCE)   Receives Help From Family   ADL Assistance Independent  ("with difficulty")   IADLs Needs assistance   Falls in the last 6 months 1 to 4  (3)   Comments pta pt reports being indep for adls and mobility w Rollator  dtr performs iadls  reports 24/7 S at home from dtr  however states her family is unable to provide her the A she needs and she feels it is unsafe to go home at this time   Restrictions/Precautions   Weight Bearing Precautions Per Order No   Other Precautions Cognitive; Chair Alarm; Bed Alarm;Multiple lines;Telemetry; Fall Risk;Pain   General   Additional Pertinent History ER consult for pt w bl knee pain  recently got spinal cord stimulator on 7/28/20  scheduled for R TKR at [address] on 10/7/20  hx of anxiety, bipolar, dperession, arthritis, tremor, FM, [place] on L, HTN, LBP, memory loss (short and long term), migraine, panic attacks, PTSD, ?stroke   activity as tolerated orders   Family/Caregiver Present No   Cognition   Overall Cognitive Status Impaired   Arousal/Participation Responsive   Orientation Level Oriented to person   Memory Decreased recall of precautions   Following Commands Follows one step commands with increased time or repetition   Comments impulsive  decreased insight into deficits   RLE Assessment   RLE Assessment X  (2+ to 3-; poor effort noted)   LLE Assessment   LLE Assessment X  (2+ to 3-; poor effort noted)   Coordination   Movements are Fluid and Coordinated 0   Bed Mobility   Supine to Sit 5  Supervision   Additional items Bedrails; Increased time required;Verbal cues; Impulsive   Transfers   Sit to Stand 5  Supervision   Additional items Increased time required;Verbal cues; Other  (AD)   Stand to Sit 5  Supervision   Additional items Increased time required;Verbal cues; Other  (AD)   Ambulation/Elevation   Gait pattern Poor UE support; Improper Weight shift; Forward Flexion; Wide CALLY; Decreased foot clearance; Inconsistent zack; Short stride; Excessively slow   Gait Assistance 4  Minimal assist  (CGA)   Additional items Assist x 1;Verbal cues  (vc for safety, use of AD)   Assistive Device Other (Comment)  (Rollator)   Distance 1' fw/bw, 1' side stepping   Balance   Static Standing Fair   Dynamic Standing Fair -   Ambulatory Fair -   Activity Tolerance   Activity Tolerance Patient limited by pain;Treatment limited secondary to medical complications (Comment)   Medical Staff Made Aware Iris Arshad PA    Nurse Made Aware RN   Assessment   Prognosis Fair   Problem List Decreased strength;Decreased endurance; Impaired balance;Decreased mobility; Decreased cognition; Impaired judgement;Decreased safety awareness; Obesity;Pain   Assessment Samantha Campuzano is a 62 y o  female admitted to [facility] on 9/4/2020 for <principal problem not specified>   Pt  has a past medical history of Acid reflux, Anxiety, Arthritis, Bipolar 2 disorder ([address] ), Depression, Familial tremor, Fibromyalgia, Hearing aid worn, [place] (hard of hearing), Hypertension, Left-sided weakness, Lower back pain, Memory loss of unknown cause, Migraine, Overactive bladder, Panic attack, Post traumatic stress disorder, Seasonal allergies, Stroke [facility]), Thrombosis of cerebral arteries, Urinary incontinence, Wears dentures, and Wears glasses    PT was consulted and pt was seen on 9/4/2020 for mobility assessment and d/c planning  Pt presents multiple lines  Pt is currently functioning at a supervision assistance x1 level for bed mobility, supervision assistance x1 level for transfers, minimum assistance x1 level for ambulation with Rollator  Pt demonstrated significantly decreased safety awareness, impulsivity, poor insight and impaired judgement impacting fall risk and functional mobility  Pt w labile moods, tangential speech, cuing to redirect attention  Noted decreased effort w MMT of BLE, inconsistent w level of functional mobility  Pt will benefit from continued skilled IP PT to address the above mentioned impairments  in order to maximize recovery and increase functional independence when completing mobility and ADLs  Currently PT recommendations for DME include RW  At this time PT recommendations for d/c are STR given fall risk, environmental barriers  Barriers to Discharge Inaccessible home environment   Barriers to Discharge Comments FOS to enter   Goals   Patient Goals to decrease pain, go to STR   STG Expiration Date 09/18/20   Short Term Goal #1 1)  Pt will perform bed mobility with Alexa demonstrating appropriate technique 100% of the time in order to improve function  2)  Perform all transfers with Alexa demonstrating safe and appropriate technique 100% of the time in order to improve ability to negotiate safely in home environment  3) Amb with least restrictive AD > 50'x1 with mod I in order to demonstrate ability to negotiate in home environment  4)  Improve overall strength and balance 1/2 grade in order to optimize ability to perform functional tasks and reduce fall risk  5) Increase activity tolerance to 45 minutes in order to improve endurance to functional tasks  6) Negotiate stairs using most appropriate technique and S in order to be able to negotiate safely in home environment  7) PT for ongoing patient and family/caregiver education, DME needs and d/c planning in order to promote highest level of function in least restrictive environment  PT Treatment Day 1   Plan   Treatment/Interventions Functional transfer training;LE strengthening/ROM; Elevations; Therapeutic exercise; Endurance training;Cognitive reorientation;Patient/family training;Equipment eval/education; Bed mobility;Gait training; Compensatory technique education;Continued evaluation;Spoke to nursing;Spoke to case management;Spoke to advanced practitioner   PT Frequency Other (Comment)  (3-5x)   Recommendation   PT Discharge Recommendation Post-Acute Rehabilitation Services   Equipment Recommended Walker   PT - OK to Discharge Yes   Additional Comments to STR   Modified [place] Scale   Modified [place] Scale 4   Barthel Index   Feeding 10   Bathing 0   Grooming Score 5   Dressing Score 5   Bladder Score 10   Bowels Score 10   Toilet Use Score 5   Transfers (Bed/Chair) Score 10   Mobility (Level Surface) Score 0   Stairs Score 0   Barthel Index Score 55   History: co - morbidities, age, coping styles, social background, past experience (prev admission), fall risk, use of assistive device, assist for iadl's, cognition, multiple lines  Exam: impairments in systems including musculoskeletal (trength, posture), neuromuscular (balance, gait, transfers, motor function), Barthel Index, cognition  Clinical: unstable/unpredictable  Complexity:high      Bina Cheng, PT     PT Progress Note  Time In: 1411 Time Out: 1423  S: "I have to go to the bathroom"  O: S for transf from EOB  Cues for safety as pt trying to pull up on Rollator without the brakes on  In standing pt w decreased safety awareness again, trying to walk without the Rollator  use of AD   Vc for safety and to reinforce use of Rollator for balance, support given BLE pain  Again noted impulsivity as pt frequently letting go of AD, trying to pull pants down without being near [facility]  Vc provided for safety  Noted fair - balance while performing dynamic standing activity (LBD)  Able to perform BSC transf at S level  CGA for amb w Rollator 1'x2 given fall risk, impulsivity  Mod Ax1 to transition back to bed for LE management  End of session pt resting in bed, PCA present, call bell in reach  CM and PA updated  A: Pt continues to be limited by impulsivity, bl knee pain, generalized weakness  As a result she needs cuing/ guidance for safety and supervision given fall risk  Pt has 24/7 support at home but states it is not enough  Still limited by decreased mobility and would not feel safe w pt attempting stairs at this time given abovementioned factors and poor balance  P: See IE for POC and d/c recommendations

## 2020-09-04 NOTE — ED NOTES
Spoke to Gisela, case management regarding snf placement  Will keep posted with CT results and admission       Sabine Jiang RN  09/04/20 7130

## 2020-09-04 NOTE — PLAN OF CARE
Problem: PHYSICAL THERAPY ADULT  Goal: Performs mobility at highest level of function for planned discharge setting  See evaluation for individualized goals  Description: Treatment/Interventions: Functional transfer training, LE strengthening/ROM, Elevations, Therapeutic exercise, Endurance training, Cognitive reorientation, Patient/family training, Equipment eval/education, Bed mobility, Gait training, Compensatory technique education, Continued evaluation, Spoke to nursing, Spoke to case management, Spoke to advanced practitioner  Equipment Recommended: Gary Kee       See flowsheet documentation for full assessment, interventions and recommendations  Note: Prognosis: Fair  Problem List: Decreased strength, Decreased endurance, Impaired balance, Decreased mobility, Decreased cognition, Impaired judgement, Decreased safety awareness, Obesity, Pain  Assessment: Samantha Apple is a 62 y o  female admitted to [facility] on 9/4/2020 for <principal problem not specified>  Pt  has a past medical history of Acid reflux, Anxiety, Arthritis, Bipolar 2 disorder ([facility] [address] ), Depression, Familial tremor, Fibromyalgia, Hearing aid worn, [place] (hard of hearing), Hypertension, Left-sided weakness, Lower back pain, Memory loss of unknown cause, Migraine, Overactive bladder, Panic attack, Post traumatic stress disorder, Seasonal allergies, Stroke ([facility] [address] ), Thrombosis of cerebral arteries, Urinary incontinence, Wears dentures, and Wears glasses    PT was consulted and pt was seen on 9/4/2020 for mobility assessment and d/c planning  Pt presents multiple lines  Pt is currently functioning at a supervision assistance x1 level for bed mobility, supervision assistance x1 level for transfers, minimum assistance x1 level for ambulation with Rollator  Pt demonstrated significantly decreased safety awareness, impulsivity, poor insight and impaired judgement impacting fall risk and functional mobility   Pt w labile moods, tangential speech, cuing to redirect attention  Noted decreased effort w MMT of BLE, inconsistent w level of functional mobility  Pt will benefit from continued skilled IP PT to address the above mentioned impairments  in order to maximize recovery and increase functional independence when completing mobility and ADLs  Currently PT recommendations for DME include RW  At this time PT recommendations for d/c are STR given fall risk, environmental barriers  Barriers to Discharge: Inaccessible home environment  Barriers to Discharge Comments: FOS to enter  PT Discharge Recommendation: [address]     PT - OK to Discharge: Yes    See flowsheet documentation for full assessment

## 2020-09-04 NOTE — SOCIAL WORK
Sent referrals to 21 SNFs in the area, none able to accept directly from the ED  Her optioning letter from the [facility] is close to it's 180 day expiration from March, SNF's unsure if they can accept on this letter or if pt needs to be re-optioned, unfortunately, [facility] is not available until Tuesday to provide information  Unable to place over the weekend or Monday as it is a holiday, will resume placement on Tuesday

## 2020-09-04 NOTE — ASSESSMENT & PLAN NOTE
Secondary to worsening of right knee osteoarthritis with plan for knee replacement scheduled in October but in unable to weightbear on the knee and was evaluated by PT in the emergency room and will need short-term rehab placement  - PT and OT evaluation, case management consult for placement

## 2020-09-04 NOTE — SOCIAL WORK
Call from the ER that pt is living at home with her daughter who recently had a baby and her daughters three children - she is having difficulty managing her needs at home in regards to personal care and ambulation due to B/L Knee Pain  She is set to have a R TKR complete with Dr Finn Adler from [facility] on 10/07  She has a dx of BiPolar disorder and Depression - per EMR - she receives General acute hospital services through [facility] services  [facility] management, Stephanie Hampton is working to get pt an ICM through [facility]  Her  with [facility] number is [phone]  CM called and spoke with her CM Margaux Antonio - she is in the beginning stages of getting the patient evaluated for a [facility]  She reports the pt was evaluated by aging in March for [facility], however, declined the services at that time as she wanted to live with her daughter  They have a PCP appointment set for her to have a physical/clearance for orthopedic surgery on Wednesday and plan to have an MA-51 completed at this time  Pt can not be placed into a SNF for care from the emergency room for rehab as she needs to be optioned by [facility] d/t having BiPolar disorder  Discussed this with Florinda Hooper PA-C in the Emergency Room  At this time, no medical criteria for admission in discussion with Estephanie  PT seeing the patient, provided them with an update

## 2020-09-04 NOTE — ED NOTES
PT contacted at this time  Aware pt requiring PT evaluation by provider  States will be down to ER to perform evaluation       Dang Barnahrt RN  09/04/20 [id number]

## 2020-09-04 NOTE — ASSESSMENT & PLAN NOTE
Continue trazodone 150 mg at bedtime, Seroquel 300 mg daily, Cymbalta 90 mg daily, BuSpar 5 mg t i d   - patient will need psychiatry consult given history of psychiatry hospitalization for placement at short-term rehab

## 2020-09-04 NOTE — ASSESSMENT & PLAN NOTE
DATE OF OPERATION: 04/14/2017    PREOPERATIVE DIAGNOSIS: Right subtrochanteric femur fracture.     POSTOPERATIVE DIAGNOSIS: Right subtrochanteric femur fracture.     PROCEDURE PERFORMED: Open reduction and internal fixation of right subtrochanteric femur fracture with [place] long Gamma nail 11 x 320 mm and 125° angled neck with a 90 mm proximal lag screw and 2 distal locking screws 5 x 40 and 45 with allografting at the fracture site.     SURGEON: Keyshawn Madden MD    ASSISTANT: Hammad Villalobos CSA     ANESTHESIA: Spinal.    ESTIMATED BLOOD LOSS: 700 mL.    DRAINS: x1.    DESCRIPTION OF PROCEDURE: The patient was taken to the operating room and after satisfactory anesthesia was obtained, the patient was placed on the fracture table and the right leg was placed in distal traction. With distal traction and slight external rotation, adequate reduction of the fracture was noted although there was not bone to bone contact. Timeout was completed identifying the patient and the procedure correctly. The right hip was then prepped and draped in the sterile field in the usual manner. The procedure was carried out with the aid of the fluoroscope. Initial incision was made from the greater trochanter proximally for about 5 to 6 cm and via blunt and sharp dissection the gluteal fascia was identified and incised exposing the tip of the greater trochanter. Attempts at placing the awl hole in the tip of the greater trochanter and then placing a guide pin down the proximal fragment were unsuccessful.  A lateral view showed there was some posterior angulation at the fracture site. After numerous attempts at passing the guidewire the incision was extended distally allowing for exposure of the fracture. Muscle was divided in line with the fibers exposing the fracture. With the fracture site now exposed and inability to pass the guidewire proximal and distal with the distal fragment exposing the wound, a drill was made to the distal  Continue home dose of morphine tablet 15 mg b i d  aspect of the proximal fragment reamed in a retrograde fashion. Once this was carried out, an 8 mm reamer was used and once the reamer was brought out through the tip of the greater trochanter, the reamer was removed and then the long guide pin was placed down the fracture site. After numerous manipulations with open reduction of the fracture itself using bone clamps, a guidewire was placed down into the distal femur. Reaming was then carried out up to a 13 mm reamer, then the proximal calcar reamer was used. Once reaming was completed, the 11 x 325 and 125 nail was passed again after numerous attempts with holding the fracture in reduced position. The nail was passed down into the distal fragment and proper seating was verified in the AP and lateral projections with the fluoroscope with alignment of the fracture. The proximal lag screw was then inserted through the proximal targeting device. Again, proper placement of the guide pin within the femoral head and neck was verified with the fluoroscope. Reaming was then carried out and this 90 mm lag screw was inserted and then seated completely. The proximal lag screw was inserted, tightened completely and then backed off a quarter turn. Using the distal targeting device now applied to the external magdaleno, 2 distal screws were inserted. Once it was verified all fixation was completed. The wound was irrigated with Betadine and normal saline. Tranexamic acid solution was injected deep in superficial tissues. Then, 10 mL of DBM was packed into the fracture site. There was bone contact laterally, but there was some comminution medially. Alignment was near anatomical on the fluoroscope and approximately 10 to 15 mL of cancellus graft was also packed in this area. The vastus lateralis and musculature was reapproximated with interrupted #2 Vicryl over a Hemovac. Tensor fascia marlen was closed with interrupted #2 Vicryl. The subcutaneous was then closed with interrupted #1  Vicryl, 2-0 Vicryl and the skin was closed with a #2 StrataFix suture. The distal incision through which the 2 distal screws were inserted was closed with 2-0 Vicryl. All wounds were then closed with skin staples and a bulky compressive dressing was applied. The patient was then transferred to recovery having tolerated the procedure well.       EFRAIN Graham  D:  04/14/2017 16:13:54   T:  04/14/2017 16:52:07   Job ID:  [id number]   Document ID:  [id number]  cc:

## 2020-09-04 NOTE — ASSESSMENT & PLAN NOTE
- acute worsening of right knee osteoarthritis will need placement in short-term rehab, plan for surgery in October for knee replacement  - continue morphine 15 mg b i d   - for pain management will add Tylenol 975 Q 8 scheduled, naproxen 250 b i d   With meals, 2 5 mg oxycodone Q for mild pain and 5 mg Q 4 severe pain with Dilaudid 0 5 IV for breakthrough pain

## 2020-09-05 ENCOUNTER — APPOINTMENT (OUTPATIENT)
Dept: RADIOLOGY | Facility: HOSPITAL | Age: 55
DRG: 392 | End: 2020-09-05
Payer: COMMERCIAL

## 2020-09-05 ENCOUNTER — LAB REQUISITION (OUTPATIENT)
Dept: LAB | Facility: HOSPITAL | Age: 55
End: 2020-09-05
Payer: COMMERCIAL

## 2020-09-05 DIAGNOSIS — M17.11 UNILATERAL PRIMARY OSTEOARTHRITIS, RIGHT KNEE: ICD-10-CM

## 2020-09-05 DIAGNOSIS — Z01.818 ENCOUNTER FOR OTHER PREPROCEDURAL EXAMINATION: ICD-10-CM

## 2020-09-05 PROBLEM — R13.10 DYSPHAGIA: Status: ACTIVE | Noted: 2020-09-05

## 2020-09-05 LAB
ABO GROUP BLD: NORMAL
ANION GAP SERPL CALCULATED.3IONS-SCNC: 6 MMOL/L (ref 4–13)
BASOPHILS # BLD AUTO: 0.03 THOUSANDS/ΜL (ref 0–0.1)
BASOPHILS NFR BLD AUTO: 1 % (ref 0–1)
BLD GP AB SCN SERPL QL: NEGATIVE
BUN SERPL-MCNC: 15 MG/DL (ref 5–25)
CALCIUM SERPL-MCNC: 8.2 MG/DL (ref 8.3–10.1)
CHLORIDE SERPL-SCNC: 104 MMOL/L (ref 100–108)
CO2 SERPL-SCNC: 30 MMOL/L (ref 21–32)
CREAT SERPL-MCNC: 0.98 MG/DL (ref 0.6–1.3)
EOSINOPHIL # BLD AUTO: 0.25 THOUSAND/ΜL (ref 0–0.61)
EOSINOPHIL NFR BLD AUTO: 4 % (ref 0–6)
ERYTHROCYTE [DISTWIDTH] IN BLOOD BY AUTOMATED COUNT: 13.4 % (ref 11.6–15.1)
GFR SERPL CREATININE-BSD FRML MDRD: 75 ML/MIN/1.73SQ M
GLUCOSE SERPL-MCNC: 91 MG/DL (ref 65–140)
HCT VFR BLD AUTO: 38.5 % (ref 34.8–46.1)
HGB BLD-MCNC: 12.6 G/DL (ref 11.5–15.4)
IMM GRANULOCYTES # BLD AUTO: 0.01 THOUSAND/UL (ref 0–0.2)
IMM GRANULOCYTES NFR BLD AUTO: 0 % (ref 0–2)
LYMPHOCYTES # BLD AUTO: 1.16 THOUSANDS/ΜL (ref 0.6–4.47)
LYMPHOCYTES NFR BLD AUTO: 21 % (ref 14–44)
MCH RBC QN AUTO: 28.5 PG (ref 26.8–34.3)
MCHC RBC AUTO-ENTMCNC: 32.7 G/DL (ref 31.4–37.4)
MCV RBC AUTO: 87 FL (ref 82–98)
MONOCYTES # BLD AUTO: 0.5 THOUSAND/ΜL (ref 0.17–1.22)
MONOCYTES NFR BLD AUTO: 9 % (ref 4–12)
NEUTROPHILS # BLD AUTO: 3.68 THOUSANDS/ΜL (ref 1.85–7.62)
NEUTS SEG NFR BLD AUTO: 65 % (ref 43–75)
NRBC BLD AUTO-RTO: 0 /100 WBCS
PLATELET # BLD AUTO: 230 THOUSANDS/UL (ref 149–390)
PMV BLD AUTO: 9.5 FL (ref 8.9–12.7)
POTASSIUM SERPL-SCNC: 3.1 MMOL/L (ref 3.5–5.3)
RBC # BLD AUTO: 4.42 MILLION/UL (ref 3.81–5.12)
RH BLD: NEGATIVE
SODIUM SERPL-SCNC: 140 MMOL/L (ref 136–145)
SPECIMEN EXPIRATION DATE: NORMAL
WBC # BLD AUTO: 5.63 THOUSAND/UL (ref 4.31–10.16)

## 2020-09-05 PROCEDURE — 73564 X-RAY EXAM KNEE 4 OR MORE: CPT

## 2020-09-05 PROCEDURE — 99222 1ST HOSP IP/OBS MODERATE 55: CPT | Performed by: INTERNAL MEDICINE

## 2020-09-05 PROCEDURE — 99232 SBSQ HOSP IP/OBS MODERATE 35: CPT | Performed by: INTERNAL MEDICINE

## 2020-09-05 PROCEDURE — 97116 GAIT TRAINING THERAPY: CPT

## 2020-09-05 PROCEDURE — 85025 COMPLETE CBC W/AUTO DIFF WBC: CPT | Performed by: FAMILY MEDICINE

## 2020-09-05 PROCEDURE — 92610 EVALUATE SWALLOWING FUNCTION: CPT

## 2020-09-05 PROCEDURE — 80048 BASIC METABOLIC PNL TOTAL CA: CPT | Performed by: FAMILY MEDICINE

## 2020-09-05 PROCEDURE — 99214 OFFICE O/P EST MOD 30 MIN: CPT | Performed by: ORTHOPAEDIC SURGERY

## 2020-09-05 RX ADMIN — PREGABALIN 150 MG: 75 CAPSULE ORAL at 20:18

## 2020-09-05 RX ADMIN — OXYCODONE HYDROCHLORIDE 5 MG: 5 TABLET ORAL at 20:47

## 2020-09-05 RX ADMIN — NAPROXEN 250 MG: 250 TABLET ORAL at 09:00

## 2020-09-05 RX ADMIN — ACETAMINOPHEN 975 MG: 325 TABLET ORAL at 05:47

## 2020-09-05 RX ADMIN — PREGABALIN 150 MG: 75 CAPSULE ORAL at 09:00

## 2020-09-05 RX ADMIN — FERROUS SULFATE TAB 325 MG (65 MG ELEMENTAL FE) 325 MG: 325 (65 FE) TAB at 08:59

## 2020-09-05 RX ADMIN — MORPHINE SULFATE 15 MG: 15 TABLET ORAL at 05:46

## 2020-09-05 RX ADMIN — PANTOPRAZOLE SODIUM 40 MG: 40 TABLET, DELAYED RELEASE ORAL at 06:03

## 2020-09-05 RX ADMIN — Medication 1000 UNITS: at 08:59

## 2020-09-05 RX ADMIN — OXYBUTYNIN CHLORIDE 5 MG: 5 TABLET ORAL at 17:08

## 2020-09-05 RX ADMIN — QUETIAPINE FUMARATE 300 MG: 300 TABLET, EXTENDED RELEASE ORAL at 22:16

## 2020-09-05 RX ADMIN — PRAZOSIN HYDROCHLORIDE 2 MG: 2 CAPSULE ORAL at 22:17

## 2020-09-05 RX ADMIN — SENNOSIDES AND DOCUSATE SODIUM 1 TABLET: 8.6; 5 TABLET ORAL at 17:08

## 2020-09-05 RX ADMIN — ACETAMINOPHEN 975 MG: 325 TABLET ORAL at 13:43

## 2020-09-05 RX ADMIN — BUSPIRONE HYDROCHLORIDE 5 MG: 5 TABLET ORAL at 16:30

## 2020-09-05 RX ADMIN — BUSPIRONE HYDROCHLORIDE 5 MG: 5 TABLET ORAL at 20:18

## 2020-09-05 RX ADMIN — DICLOFENAC SODIUM 4 G: 10 GEL TOPICAL at 22:15

## 2020-09-05 RX ADMIN — PREGABALIN 150 MG: 75 CAPSULE ORAL at 16:30

## 2020-09-05 RX ADMIN — ENOXAPARIN SODIUM 40 MG: 40 INJECTION SUBCUTANEOUS at 08:59

## 2020-09-05 RX ADMIN — OXYBUTYNIN CHLORIDE 5 MG: 5 TABLET ORAL at 09:00

## 2020-09-05 RX ADMIN — ACETAMINOPHEN 975 MG: 325 TABLET ORAL at 22:13

## 2020-09-05 RX ADMIN — DULOXETINE HYDROCHLORIDE 30 MG: 30 CAPSULE, DELAYED RELEASE ORAL at 09:02

## 2020-09-05 RX ADMIN — LIDOCAINE 1 PATCH: 50 PATCH CUTANEOUS at 08:57

## 2020-09-05 RX ADMIN — SENNOSIDES AND DOCUSATE SODIUM 1 TABLET: 8.6; 5 TABLET ORAL at 08:59

## 2020-09-05 RX ADMIN — DICLOFENAC SODIUM 4 G: 10 GEL TOPICAL at 09:07

## 2020-09-05 RX ADMIN — MORPHINE SULFATE 15 MG: 15 TABLET ORAL at 17:08

## 2020-09-05 RX ADMIN — NAPROXEN 250 MG: 250 TABLET ORAL at 16:30

## 2020-09-05 RX ADMIN — OXYCODONE HYDROCHLORIDE 5 MG: 5 TABLET ORAL at 04:10

## 2020-09-05 RX ADMIN — OXYCODONE HYDROCHLORIDE AND ACETAMINOPHEN 500 MG: 500 TABLET ORAL at 08:59

## 2020-09-05 RX ADMIN — OXYCODONE HYDROCHLORIDE AND ACETAMINOPHEN 500 MG: 500 TABLET ORAL at 17:08

## 2020-09-05 RX ADMIN — DULOXETINE HYDROCHLORIDE 60 MG: 60 CAPSULE, DELAYED RELEASE ORAL at 09:03

## 2020-09-05 RX ADMIN — TRAZODONE HYDROCHLORIDE 150 MG: 100 TABLET ORAL at 22:13

## 2020-09-05 RX ADMIN — BUSPIRONE HYDROCHLORIDE 5 MG: 5 TABLET ORAL at 09:00

## 2020-09-05 NOTE — PLAN OF CARE
Problem: Potential for Falls  Goal: Patient will remain free of falls  Description: INTERVENTIONS:  - Assess patient frequently for physical needs  -  Identify cognitive and physical deficits and behaviors that affect risk of falls  -  [place] fall precautions as indicated by assessment   - Educate patient/family on patient safety including physical limitations  - Instruct patient to call for assistance with activity based on assessment  - Modify environment to reduce risk of injury  - Consider OT/PT consult to assist with strengthening/mobility  Outcome: Progressing     Problem: Nutrition/Hydration-ADULT  Goal: Nutrient/Hydration intake appropriate for improving, restoring or maintaining nutritional needs  Description: Monitor and assess patient's nutrition/hydration status for malnutrition  Collaborate with interdisciplinary team and initiate plan and interventions as ordered  Monitor patient's weight and dietary intake as ordered or per policy  Utilize nutrition screening tool and intervene as necessary  Determine patient's food preferences and provide high-protein, high-caloric foods as appropriate       INTERVENTIONS:  - Monitor oral intake, urinary output, labs, and treatment plans  - Assess nutrition and hydration status and recommend course of action  - Evaluate amount of meals eaten  - Assist patient with eating if necessary   - Allow adequate time for meals  - Recommend/ encourage appropriate diets, oral nutritional supplements, and vitamin/mineral supplements  - Order, calculate, and assess calorie counts as needed  - Recommend, monitor, and adjust tube feedings and TPN/PPN based on assessed needs  - Assess need for intravenous fluids  - Provide specific nutrition/hydration education as appropriate  - Include patient/family/caregiver in decisions related to nutrition  Outcome: Progressing     Problem: Prexisting or High Potential for Compromised Skin Integrity  Goal: Skin integrity is maintained or improved  Description: INTERVENTIONS:  - Identify patients at risk for skin breakdown  - Assess and monitor skin integrity  - Assess and monitor nutrition and hydration status  - Monitor labs   - Assess for incontinence   - Turn and reposition patient  - Assist with mobility/ambulation  - Relieve pressure over bony prominences  - Avoid friction and shearing  - Provide appropriate hygiene as needed including keeping skin clean and dry  - Evaluate need for skin moisturizer/barrier cream  - Collaborate with interdisciplinary team   - Patient/family teaching  - Consider wound care consult   Outcome: Progressing     Problem: PAIN - ADULT  Goal: Verbalizes/displays adequate comfort level or baseline comfort level  Description: Interventions:  - Encourage patient to monitor pain and request assistance  - Assess pain using appropriate pain scale  - Administer analgesics based on type and severity of pain and evaluate response  - Implement non-pharmacological measures as appropriate and evaluate response  - Consider cultural and social influences on pain and pain management  - Notify physician/advanced practitioner if interventions unsuccessful or patient reports new pain  Outcome: Progressing     Problem: INFECTION - ADULT  Goal: Absence or prevention of progression during hospitalization  Description: INTERVENTIONS:  - Assess and monitor for signs and symptoms of infection  - Monitor lab/diagnostic results  - Monitor all insertion sites, i e  indwelling lines, tubes, and drains  - Monitor endotracheal if appropriate and nasal secretions for changes in amount and color  - [place] appropriate cooling/warming therapies per order  - Administer medications as ordered  - Instruct and encourage patient and family to use good hand hygiene technique  - Identify and instruct in appropriate isolation precautions for identified infection/condition  Outcome: Progressing  Goal: Absence of fever/infection during neutropenic period  Description: INTERVENTIONS:  - Monitor WBC    Outcome: Progressing     Problem: SAFETY ADULT  Goal: Patient will remain free of falls  Description: INTERVENTIONS:  - Assess patient frequently for physical needs  -  Identify cognitive and physical deficits and behaviors that affect risk of falls    -  [place] fall precautions as indicated by assessment   - Educate patient/family on patient safety including physical limitations  - Instruct patient to call for assistance with activity based on assessment  - Modify environment to reduce risk of injury  - Consider OT/PT consult to assist with strengthening/mobility  Outcome: Progressing  Goal: Maintain or return to baseline ADL function  Description: INTERVENTIONS:  -  Assess patient's ability to carry out ADLs; assess patient's baseline for ADL function and identify physical deficits which impact ability to perform ADLs (bathing, care of mouth/teeth, toileting, grooming, dressing, etc )  - Assess/evaluate cause of self-care deficits   - Assess range of motion  - Assess patient's mobility; develop plan if impaired  - Assess patient's need for assistive devices and provide as appropriate  - Encourage maximum independence but intervene and supervise when necessary  - Involve family in performance of ADLs  - Assess for home care needs following discharge   - Consider OT consult to assist with ADL evaluation and planning for discharge  - Provide patient education as appropriate  Outcome: Progressing  Goal: Maintain or return mobility status to optimal level  Description: INTERVENTIONS:  - Assess patient's baseline mobility status (ambulation, transfers, stairs, etc )    - Identify cognitive and physical deficits and behaviors that affect mobility  - Identify mobility aids required to assist with transfers and/or ambulation (gait belt, sit-to-stand, lift, walker, cane, etc )  - [place] fall precautions as indicated by assessment  - Record patient progress and toleration of activity level on Mobility SBAR; progress patient to next Phase/Stage  - Instruct patient to call for assistance with activity based on assessment  - Consider rehabilitation consult to assist with strengthening/weightbearing, etc   Outcome: Progressing     Problem: DISCHARGE PLANNING  Goal: Discharge to home or other facility with appropriate resources  Description: INTERVENTIONS:  - Identify barriers to discharge w/patient and caregiver  - Arrange for needed discharge resources and transportation as appropriate  - Identify discharge learning needs (meds, wound care, etc )  - Arrange for interpretive services to assist at discharge as needed  - Refer to Case Management Department for coordinating discharge planning if the patient needs post-hospital services based on physician/advanced practitioner order or complex needs related to functional status, cognitive ability, or social support system  Outcome: Progressing     Problem: Knowledge Deficit  Goal: Patient/family/caregiver demonstrates understanding of disease process, treatment plan, medications, and discharge instructions  Description: Complete learning assessment and assess knowledge base    Interventions:  - Provide teaching at level of understanding  - Provide teaching via preferred learning methods  Outcome: Progressing     Problem: NEUROSENSORY - ADULT  Goal: Achieves stable or improved neurological status  Description: INTERVENTIONS  - Monitor and report changes in neurological status  - Monitor vital signs such as temperature, blood pressure, glucose, and any other labs ordered   - Initiate measures to prevent increased intracranial pressure  - Monitor for seizure activity and implement precautions if appropriate      Outcome: Progressing  Goal: Remains free of injury related to seizures activity  Description: INTERVENTIONS  - Maintain airway, patient safety  and administer oxygen as ordered  - Monitor patient for seizure activity, document and report duration and description of seizure to physician/advanced practitioner  - If seizure occurs,  ensure patient safety during seizure  - Reorient patient post seizure  - Seizure pads on all 4 side rails  - Instruct patient/family to notify RN of any seizure activity including if an aura is experienced  - Instruct patient/family to call for assistance with activity based on nursing assessment  - Administer anti-seizure medications if ordered    Outcome: Progressing  Goal: Achieves maximal functionality and self care  Description: INTERVENTIONS  - Monitor swallowing and airway patency with patient fatigue and changes in neurological status  - Encourage and assist patient to increase activity and self care     - Encourage visually impaired, hearing impaired and aphasic patients to use assistive/communication devices  Outcome: Progressing     Problem: CARDIOVASCULAR - ADULT  Goal: Maintains optimal cardiac output and hemodynamic stability  Description: INTERVENTIONS:  - Monitor I/O, vital signs and rhythm  - Monitor for S/S and trends of decreased cardiac output  - Administer and titrate ordered vasoactive medications to optimize hemodynamic stability  - Assess quality of pulses, skin color and temperature  - Assess for signs of decreased coronary artery perfusion  - Instruct patient to report change in severity of symptoms  Outcome: Progressing  Goal: Absence of cardiac dysrhythmias or at baseline rhythm  Description: INTERVENTIONS:  - Continuous cardiac monitoring, vital signs, obtain 12 lead EKG if ordered  - Administer antiarrhythmic and heart rate control medications as ordered  - Monitor electrolytes and administer replacement therapy as ordered  Outcome: Progressing     Problem: RESPIRATORY - ADULT  Goal: Achieves optimal ventilation and oxygenation  Description: INTERVENTIONS:  - Assess for changes in respiratory status  - Assess for changes in mentation and behavior  - Position to facilitate oxygenation and minimize respiratory effort  - Oxygen administered by appropriate delivery if ordered  - Initiate smoking cessation education as indicated  - Encourage broncho-pulmonary hygiene including cough, deep breathe, Incentive Spirometry  - Assess the need for suctioning and aspirate as needed  - Assess and instruct to report SOB or any respiratory difficulty  - Respiratory Therapy support as indicated  Outcome: Progressing     Problem: GASTROINTESTINAL - ADULT  Goal: Minimal or absence of nausea and/or vomiting  Description: INTERVENTIONS:  - Administer IV fluids if ordered to ensure adequate hydration  - Maintain NPO status until nausea and vomiting are resolved  - Nasogastric tube if ordered  - Administer ordered antiemetic medications as needed  - Provide nonpharmacologic comfort measures as appropriate  - Advance diet as tolerated, if ordered  - Consider nutrition services referral to assist patient with adequate nutrition and appropriate food choices  Outcome: Progressing  Goal: Maintains or returns to baseline bowel function  Description: INTERVENTIONS:  - Assess bowel function  - Encourage oral fluids to ensure adequate hydration  - Administer IV fluids if ordered to ensure adequate hydration  - Administer ordered medications as needed  - Encourage mobilization and activity  - Consider nutritional services referral to assist patient with adequate nutrition and appropriate food choices  Outcome: Progressing  Goal: Maintains adequate nutritional intake  Description: INTERVENTIONS:  - Monitor percentage of each meal consumed  - Identify factors contributing to decreased intake, treat as appropriate  - Assist with meals as needed  - Monitor I&O, weight, and lab values if indicated  - Obtain nutrition services referral as needed  Outcome: Progressing     Problem: GENITOURINARY - ADULT  Goal: Maintains or returns to baseline urinary function  Description: INTERVENTIONS:  - Assess urinary function  - Encourage oral fluids to ensure adequate hydration if ordered  - Administer IV fluids as ordered to ensure adequate hydration  - Administer ordered medications as needed  - Offer frequent toileting  - Follow urinary retention protocol if ordered  Outcome: Progressing  Goal: Absence of urinary retention  Description: INTERVENTIONS:  - Assess patients ability to void and empty bladder  - Monitor I/O  - Bladder scan as needed  - Discuss with physician/AP medications to alleviate retention as needed  - Discuss catheterization for long term situations as appropriate  Outcome: Progressing     Problem: METABOLIC, FLUID AND ELECTROLYTES - ADULT  Goal: Electrolytes maintained within normal limits  Description: INTERVENTIONS:  - Monitor labs and assess patient for signs and symptoms of electrolyte imbalances  - Administer electrolyte replacement as ordered  - Monitor response to electrolyte replacements, including repeat lab results as appropriate  - Instruct patient on fluid and nutrition as appropriate  Outcome: Progressing  Goal: Fluid balance maintained  Description: INTERVENTIONS:  - Monitor labs   - Monitor I/O and WT  - Instruct patient on fluid and nutrition as appropriate  - Assess for signs & symptoms of volume excess or deficit  Outcome: Progressing  Goal: Glucose maintained within target range  Description: INTERVENTIONS:  - Monitor Blood Glucose as ordered  - Assess for signs and symptoms of hyperglycemia and hypoglycemia  - Administer ordered medications to maintain glucose within target range  - Assess nutritional intake and initiate nutrition service referral as needed  Outcome: Progressing     Problem: SKIN/TISSUE INTEGRITY - ADULT  Goal: Skin integrity remains intact  Description: INTERVENTIONS  - Identify patients at risk for skin breakdown  - Assess and monitor skin integrity  - Assess and monitor nutrition and hydration status  - Monitor labs (i e  albumin)  - Assess for incontinence   - Turn and reposition patient  - Assist with mobility/ambulation  - Relieve pressure over bony prominences  - Avoid friction and shearing  - Provide appropriate hygiene as needed including keeping skin clean and dry  - Evaluate need for skin moisturizer/barrier cream  - Collaborate with interdisciplinary team (i e  Nutrition, Rehabilitation, etc )   - Patient/family teaching  Outcome: Progressing  Goal: Oral mucous membranes remain intact  Description: INTERVENTIONS  - Assess oral mucosa and hygiene practices  - Implement preventative oral hygiene regimen  - Implement oral medicated treatments as ordered  - Initiate Nutrition services referral as needed  Outcome: Progressing     Problem: HEMATOLOGIC - ADULT  Goal: Maintains hematologic stability  Description: INTERVENTIONS  - Assess for signs and symptoms of bleeding or hemorrhage  - Monitor labs  - Administer supportive blood products/factors as ordered and appropriate  Outcome: Progressing     Problem: MUSCULOSKELETAL - ADULT  Goal: Maintain or return mobility to safest level of function  Description: INTERVENTIONS:  - Assess patient's ability to carry out ADLs; assess patient's baseline for ADL function and identify physical deficits which impact ability to perform ADLs (bathing, care of mouth/teeth, toileting, grooming, dressing, etc )  - Assess/evaluate cause of self-care deficits   - Assess range of motion  - Assess patient's mobility  - Assess patient's need for assistive devices and provide as appropriate  - Encourage maximum independence but intervene and supervise when necessary  - Involve family in performance of ADLs  - Assess for home care needs following discharge   - Consider OT consult to assist with ADL evaluation and planning for discharge  - Provide patient education as appropriate  Outcome: Progressing  Goal: Maintain proper alignment of affected body part  Description: INTERVENTIONS:  - Support, maintain and protect limb and body alignment  - Provide patient/ family with appropriate education  Outcome: Progressing

## 2020-09-05 NOTE — ASSESSMENT & PLAN NOTE
-patient has a history of chronic pain syndrome with continuous opioid dependence  -has a history of chronic low back pain radiating down into both lower extremities  -she follows with the neurosurgery team  -with most recently seen in the office 08/2020 for her 2 week postoperative checkup after an implantable thoracic spine stimulator  -she had previous lumbar surgery with post-laminectomy syndrome  -has 2 spinal stimulators implanted  -patient notes her chronic pain has worsened, resulting in ambulatory dysfunction  -continue her home MS ER 15 mg b i d  And Lyrica  -[place]- website was queried, and there are no red flags  Patient was also prescribed Norco 5 mg p r n   Pain, which she states she is not currently taking

## 2020-09-05 NOTE — ASSESSMENT & PLAN NOTE
-Continue home medications with trazodone 150 mg at bedtime, Seroquel 300 mg q h s , Cymbalta 90 mg daily, BuSpar 5 mg t i d   -patient's mood is stable  She is smiling, interactive, and appropriate    No evidence of a manic or depressive phase   -continue home medications

## 2020-09-05 NOTE — UTILIZATION REVIEW
Initial Clinical Review    Admission: Date/Time/Statement:   Admission Orders (From admission, onward)     Ordered        09/04/20 1630  Place in Observation (expected length of stay for this patient is less than two midnights)  Once                   Orders Placed This Encounter   Procedures    Place in Observation (expected length of stay for this patient is less than two midnights)     Standing Status:   Standing     Number of Occurrences:   1     Order Specific Question:   Admitting Physician     Answer:   Leola Hernandez     Order Specific Question:   Level of Care     Answer:   Med Surg [16]     ED Arrival Information     Expected Arrival Acuity Means of Arrival Escorted By Service Admission Type    - 9/4/2020 10:01 Urgent Walk-In Self Hospitalist Urgent    Arrival Complaint    medical problem        Chief Complaint   Patient presents with    Difficulty Walking     Pt  reports she is going to have right knee surgery in october  Pt  reports she cant walk   Shortness of Breath     Pt  reports SOB with walking  Assessment/Plan: Patient presents emergency department with ambulatory dysfunction  She is scheduled to have surgery for her right knee in October  She was here the hospital for preadmission testing - was having increased difficulty walking and was having shortness of breath while walking  She has had this as a problem but it has been getting worse over the past week  Patient has been working with her doctors trying to get her to and in patient care facility because she is unable to do the stairs at her house and does have help at home and did not want anyone coming into the house to help her- due to [place] and there is a new born baby at the house- grand kid  B/L knee pain and feels legs are weak - no new injury       ED Triage Vitals   Temperature Pulse Respirations Blood Pressure SpO2   09/04/20 1004 09/04/20 1004 09/04/20 1004 09/04/20 1004 09/04/20 1004   98 3 °F (36 8 °C) 68 18 120/76 99 %      Temp Source Heart Rate Source Patient Position - Orthostatic VS BP Location FiO2 (%)   09/04/20 1004 09/04/20 1004 09/04/20 1004 09/04/20 1004 --   Temporal Monitor Sitting Right arm       Pain Score       09/04/20 1115       8          Wt Readings from Last 1 Encounters:   09/04/20 97 5 kg (214 lb 15 2 oz)     Additional Vital Signs:   Date/Time   Temp   Pulse   Resp   BP   MAP (mmHg)   SpO2   O2 Device   Patient Position - Orthostatic VS    09/05/20 0810   97 9 °F (36 6 °C)   66   18   91/54      98 %   None (Room air)   Lying - Orthostatic VS    09/04/20 2306   98 2 °F (36 8 °C)   63   18   132/73      95 %      Lying    09/04/20 2052      64      156/94                09/04/20 1842   97 5 °F (36 4 °C)   65   18   144/81      99 %   None (Room air)   Lying    09/04/20 1800      66   18   111/75   85   98 %   None (Room air)   Lying    09/04/20 1651      77   17   104/64      98 %   None (Room air)   Lying    09/04/20 1600      80   18   120/83   101   97 %   None (Room air)   Sitting    09/04/20 1423      95   18         95 %   None (Room air)       09/04/20 1332      62   17   150/78      97 %   None (Room air)   Lying    09/04/20 1228      67   17   147/74      97 %   None (Room air)   Lying    09/04/20 1102      68   17         99 %   None (Room air)          Pertinent Labs/Diagnostic Test Results:   Results from last 7 days   Lab Units 09/04/20  1559   SARS-COV-2  Negative     Results from last 7 days   Lab Units 09/05/20  0644 09/04/20  0928   WBC Thousand/uL 5 63 3 68*   HEMOGLOBIN g/dL 12 6 13 4   HEMATOCRIT % 38 5 41 4   PLATELETS Thousands/uL 230 251   NEUTROS ABS Thousands/µL 3 68 1 62*         Results from last 7 days   Lab Units 09/05/20  0644 09/04/20  0928   SODIUM mmol/L 140 143   POTASSIUM mmol/L 3 1* 3 1*   CHLORIDE mmol/L 104 104   CO2 mmol/L 30 31   ANION GAP mmol/L 6 8   BUN mg/dL 15 14   CREATININE mg/dL 0 98 0 94   EGFR ml/min/1 73sq m 75 78 CALCIUM mg/dL 8 2* 8 3     Results from last 7 days   Lab Units 09/04/20  0928   AST U/L 12   ALT U/L 17   ALK PHOS U/L 121*   TOTAL PROTEIN g/dL 7 2   ALBUMIN g/dL 3 4*   TOTAL BILIRUBIN mg/dL 0 88         Results from last 7 days   Lab Units 09/05/20  0644   GLUCOSE RANDOM mg/dL 91         Results from last 7 days   Lab Units 09/04/20  0922   HEMOGLOBIN A1C % 5 0   EAG mg/dl 97     Results from last 7 days   Lab Units 09/04/20  1100   TROPONIN I ng/mL <0 02     Results from last 7 days   Lab Units 09/04/20  1100   D-DIMER QUANTITATIVE ug/ml FEU 0 62*     Results from last 7 days   Lab Units 09/04/20  0928   PROTIME seconds 13 9   INR  1 09   PTT seconds 33       Results from last 7 days   Lab Units 09/04/20  0928   NT-PRO BNP pg/mL 57     Results from last 7 days   Lab Units 09/04/20  0928   FERRITIN ng/mL 52     CTA chest PE  09-04-20  No acute intrathoracic pathology identified, consistent with x-rays   Specifically, no evidence of pulmonary embolism   Suspected reflux esophagitis   Multilevel thoracolumbar spinal fusion surgery   Scoliosis     CXR 09-04-20  No acute consolidation or congestion    ED Treatment:   Medication Administration from 09/04/2020 1001 to 09/04/2020 1845       Date/Time Order Dose Route Action     09/04/2020 1100 potassium chloride (K-DUR,KLOR-CON) CR tablet 20 mEq 20 mEq Oral Given     09/04/2020 1115 ketorolac (TORADOL) injection 15 mg 15 mg Intravenous Given     09/04/2020 1225 iohexol (OMNIPAQUE) 350 MG/ML injection (MULTI-DOSE) 100 mL 100 mL Intravenous Given     09/04/2020 1804 morphine (MSIR) IR tablet 15 mg 15 mg Oral Given        Past Medical History:   Diagnosis Date    Acid reflux     Anxiety     RESOLVED: [id number]    Arthritis     Bipolar 2 disorder (HCC)     FOLLOWS WITH PSYCHIATRIST   CONTINUE LAMOTRIGINE; RESOLVED: [id number]    Depression     Familial tremor     both hands    Fibromyalgia     LAST ASSESSED: [id number]    Hearing aid worn     left ear    [place] (hard of hearing)     left ear    Hypertension     Left-sided weakness     Lower back pain     Memory loss of unknown cause     long and short term    Migraine     Overactive bladder     Panic attack     Post traumatic stress disorder     Seasonal allergies     Stroke [facility])     questionable stroke 2009    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    CONT ASA 81 MG DAILY; RESOLVED: [id number]    Urinary incontinence     Wears dentures     partial lower / full upper    Wears glasses      Present on Admission:   Chronic bilateral low back pain with bilateral sciatica   Anxiety   Bipolar II disorder ([facility])   Esophageal reflux   Hypertension   Hypokalemia   Insomnia   Urinary incontinence   Tardive dyskinesia   Ambulatory dysfunction   Right knee pain      Admitting Diagnosis: Bipolar II disorder ([facility] [address] ) [F31 81]  Knee pain [M25 569]  Asthenia [R53 1]  Anxiety disorder due to multiple medical problems [F06 8]  Ambulatory dysfunction [R26 2]  Age/Sex: 62 y o  female  Admission Orders:  Scheduled Medications:  acetaminophen, 975 mg, Oral, Q8H [facility]  amLODIPine, 5 mg, Oral, Daily  ascorbic acid, 500 mg, Oral, BID  busPIRone, 5 mg, Oral, TID  cholecalciferol, 1,000 Units, Oral, Daily  diclofenac sodium, 4 g, Topical, 4x Daily  DULoxetine, 30 mg, Oral, Daily  DULoxetine, 60 mg, Oral, Daily  enoxaparin, 40 mg, Subcutaneous, Q24H KESHIA  ferrous sulfate, 325 mg, Oral, Daily With Breakfast  lidocaine, 1 patch, Topical, Daily  morphine, 15 mg, Oral, Q12H  naproxen, 250 mg, Oral, BID With Meals    And  pantoprazole, 40 mg, Oral, Daily Before Breakfast  oxybutynin, 5 mg, Oral, BID  prazosin, 2 mg, Oral, HS  pregabalin, 150 mg, Oral, TID  propranolol, 20 mg, Oral, BID  QUEtiapine, 300 mg, Oral, HS  senna-docusate sodium, 1 tablet, Oral, BID  traZODone, 150 mg, Oral, HS  Valbenazine Tosylate, 80 mg, Oral, Daily      Continuous IV Infusions:     PRN Meds:  HYDROmorphone, 0 2 mg, Intravenous, Q4H PRN  hydrOXYzine HCL, 50 mg, Oral, HS PRN  LORazepam, 0 5 mg, Oral, Q8H PRN  naloxone, 0 04 mg, Intravenous, Q1MIN PRN  ondansetron, 4 mg, Intravenous, Q6H PRN  oxyCODONE, 2 5 mg, Oral, Q4H PRN  oxyCODONE, 5 mg, Oral, Q4H PRN  polyethylene glycol, 17 g, Oral, Daily PRN        IP CONSULT TO PSYCHIATRY  IP CONSULT TO CASE MANAGEMENT  IP CONSULT TO ORTHOPEDIC SURGERY   PT/OT/Speech  SCD    Network Utilization Review Department  [email or web address]  ATTENTION: Please call with any questions or concerns to [phone] and carefully listen to the prompts so that you are directed to the right person  All voicemails are confidential   Kait Loera all requests for admission clinical reviews, approved or denied determinations and any other requests to dedicated fax number below belonging to the campus where the patient is receiving treatment   List of dedicated fax numbers for the Facilities:  [address] [place] [address] DENIALS (Administrative/Medical Necessity) [phone]   [address] [place] [address] (Maternity/NICU/Pediatrics) [phone]   Paulette Flatten [phone]   Erick Hagan [phone]   Rachel Landry [phone]   Jony Mcguire [phone]   [address] [place] [address] [phone]   [facility]  [phone]   [address] [facility]  [address] [facility] [address] [facility] [phone]

## 2020-09-05 NOTE — ASSESSMENT & PLAN NOTE
-patient has a history of essential hypertension  -continue home medications with propranolol 20 mg b i d , prazosin 2 mg daily, amlodipine 5 mg daily

## 2020-09-05 NOTE — ASSESSMENT & PLAN NOTE
-patient presented to the ER for evaluation of ambulatory dysfunction, inability to ambulate or weight bear on her right leg  -she noted this was secondary to worsening of her bilateral knee, but especially her right knee osteoarthritis, as well as worsening of her chronic low back pain  -outpatient records were reviewed and she is followed by the orthopedic surgery team with Dr Leilani Perez, and Dr Jodi Vale  - PT and OT evaluation, case management consult for short-term rehab as patient feels she is not able to ambulate safely in her home environment  -conferred with orthopedic surgery for re-evaluation:    -as patient is not able to ambulate, perform her ADLs, return to her home environment, due to her pain she will require short-term rehab and pain control  She will require a greater than 2 midnight stay    Will change to inpatient status

## 2020-09-05 NOTE — SPEECH THERAPY NOTE
Speech Language/Pathology  Speech-Language Pathology Bedside Swallow Evaluation        Patient Name: Luis Eduardo Schaffer    AISHK'D Date: 2020     Problem List  Principal Problem:    Ambulatory dysfunction  Active Problems:    Chronic bilateral low back pain with bilateral sciatica    Right knee pain    Anxiety    Bipolar II disorder (HCC)    Esophageal reflux    Hypertension    Hypokalemia    Insomnia    Urinary incontinence    Tardive dyskinesia         Past Medical History  Past Medical History:   Diagnosis Date    Acid reflux     Anxiety     RESOLVED: [id number]    Arthritis     Bipolar 2 disorder (HCC)     FOLLOWS WITH PSYCHIATRIST  CONTINUE LAMOTRIGINE; RESOLVED: [id number]    Depression     Familial tremor     both hands    Fibromyalgia     LAST ASSESSED: [id number]    Hearing aid worn     left ear    [place] (hard of hearing)     left ear    Hypertension     Left-sided weakness     Lower back pain     Memory loss of unknown cause     long and short term    Migraine     Overactive bladder     Panic attack     Post traumatic stress disorder     Seasonal allergies     Stroke [facility])     questionable stroke 2009    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    CONT ASA 81 MG DAILY; RESOLVED: [id number]    Urinary incontinence     Wears dentures     partial lower / full upper    Wears glasses        Past Surgical History  Past Surgical History:   Procedure Laterality Date    BACK SURGERY       SECTION      COLONOSCOPY      RESOLVED: [id number]    EAR SURGERY      HYSTERECTOMY  2004    MYRINGOTOMY W/ TUBES Left     NECK SURGERY  2019    [place] CYSTOURETHROSCOPY N/A 2016    Procedure: CYSTOSCOPY, BOTOX INJECTION;  Surgeon: Deloris Velasquez MD;  Location: [facility];  Service: Gynecology    [place] PERCUT IMPLNT Neela Curb Right 2020    Procedure: INSERTION THORACIC DORSAL COLUMN SPINAL CORD STIMULATOR PERCUTANEOUS W IMPLANTABLE PULSE GENERATOR, RIGHT;  Surgeon: Benjamin To MD;  Location:  MAIN OR;  Service: Neurosurgery   [facility]       Summary    Pt presents with oropharyngeal swallow that appears [facility] (although she reports that she cannot eat hard/raw fruits and vegetables due to being edentulous, but eats everything else)  There were no s/s of aspiration  Pt does c/o food sticking in the mid chest area, which is painful at times  She feels this has been going on for at least two weeks, and that alternating solids and liquids are helpful  She also reports waking up at night with "fluid" going up her nose and even dripping out her nose; suspect this is refluxed material  Pt does admit to eating right before bed at times  Pt's dysphagia appears to be esophageal in nature, but is at risk for bottom up aspiration  She would benefit from a GI consult  Recommendations:   Diet: regular diet and thin liquids, avoid raw fruit/veggies  Meds: whole with liquid   Independent with meals  Aspiration precautions and compensatory swallowing strategies: upright posture, slow rate of feeding, small bites/sips and alternating bites and sips   Reflux precautions, including sitting upright at least 30-45 minutes after meals, avoid eating 2-3 hours before bed, smaller more frequent meals (reviewed these with pt)  Other Recommendations/ considerations: GI consult recommended, pt may benefit from EGD or esophagram  ST to follow up after GI consult  Current Medical Status  Copied from admission:  Samantha Beckford is a 62 y o  female who presents with knee pain  She reports acute worsening of her knee over the last week and reports today that she was unable to weight bear secondary to pain  She reports some mild associated swelling of the knee joint  She reports today her knee almost gave out while walking on the stairs and had a near fall  Denies any rash  Denies fever and chills    She has extensive psychiatry history including bipolar 2, anxiety, tardive dyskinesia, depression and follows with outpatient psychiatry and did have an inpatient psychiatry admission in the past and will need psychiatry evaluation prior to placement  She denies recent illness  She reports that she lives at home with her daughter and her 4 children  Order received and chart reviewed  Spoke with RN, who reports pt took her pills this morning without difficulty, but pt c/o food sticking and not going down while eating breakfast      Past medical history:   Please see H&P for details    Special Studies:  CXR-FINDINGS:  Cardiomediastinal silhouette appears unremarkable  The lungs are clear  No pneumothorax or pleural effusion  Postsurgical changes from spine surgery    Spinal cord stimulator noted with paddle  IMPRESSION:  No acute consolidation or congestion        Social/Education/Vocational Hx:  Pt lives with family    Swallow Information   Current Risks for Dysphagia & Aspiration: Tardive dyskinesia, GERD  Current Symptoms/Concerns: pt c/o food sticking in mid chest area, also reports occasional severe reflux at night that awakens her, with "fluid" coming out her nose  Current Diet: regular diet and thin liquids   Baseline Diet: regular diet and thin liquids    Baseline Assessment   Behavior/Cognition: alert  Speech/Language Status: able to participate in conversation, able to follow commands and mildly reduced intelligibility, due to increased rate of speech, tardive dyskinesia (mild extraneous lingual movements)  Patient Positioning: upright in chair     Swallow Mechanism Exam   Facial: symmetrical  Labial: WFL  Lingual: mild extraneous movements  Velum: symmetrical  Mandible: adequate ROM  Dentition: edentulous  Vocal quality:clear/adequate   Volitional Cough: strong/productive   Respiratory: RA    Consistencies Assessed and Performance   Consistencies Administered: thin liquids, puree and hard solids    Oral Stage: Adequate bolus retrieval, adequate bite strength with cracker, prompt mastication, bolus formation and transfer, no pocketing or residue  Pharyngeal Stage: Hyolaryngeal elevation was observed and palpated  Swallows appear timely  There were no s/s of aspiration  Esophageal Concerns: globus sensation      Results Reviewed with: patient, RN and MD   Dysphagia Goals: 1  Pt will tolerate regular diet and thin liquids with adherence to reflux precautions, with no s/s of aspiration  2  Pt will participate in further assessment of the esophagus with GI     Discharge recommendation: likely no follow up needed for swallowing    Speech Therapy Prognosis   Prognosis: good    Prognosis Considerations: age, medical status, cognitive status and therapeutic potential

## 2020-09-05 NOTE — ASSESSMENT & PLAN NOTE
-patient has a history of tardive dyskinesia  -continue home medication with Valbenazine Tosylate CAPS 80 mg

## 2020-09-05 NOTE — ASSESSMENT & PLAN NOTE
-continue home medications with hydroxyzine at bedtime and Ativan 0 5 mg p r n  T i d   -PA- website was queried, and there are no red flag  -continue Buspar 5mgt i d , Cymbalta 60 mg daily, Seroquel 100 mg q h s , and trazodone 150 mg at bedtime:  Doses were confirmed personally with patient at the bedside

## 2020-09-05 NOTE — CONSULTS
Consultation - [facility]  Samantha QUINONEZ Kash Loose 62 y o  female MRN: [id number]  Unit/Bed#: E5 -01 Encounter: [id number]        Consults    Reason for Consult / Principal Problem:     Dysphagia      ASSESSMENT AND PLAN:      20-year-old with bilateral knee osteoarthritis, tardive dyskinesia, GERD presented with right knee pain  GI consulted for dysphagia  1  Dysphagia  For the last 1 month to solids and liquids  We will get barium swallow for now  Plan to do EGD on Tuesday to investigate further  2  GERD  Uncontrolled  On PPI  Will define management after doing EGD  3  Family history of colon cancer   Last colonoscopy was in 2014 where 1 polyp was removed and the plan was to repeat colonoscopy in 5 years  Orma Pam MD  Gastroenterology Fellow  [address]  Date: September 5, 2020        ______________________________________________________________________    HPI:    Patient says that she has been having difficulty swallowing solids and liquids for the last 1 month  The difficulty is not worsening  Sometimes she has pain on swallowing as well  She has heartburn but no nausea, vomiting  No abdominal pain, constipation, diarrhea, blood in stools  She has been having weight gain  No alcohol intake or cigarette smoking  Father had colon cancer diagnosed at age 61  Her last colonoscopy was in 2014 where 1 polyp was removed and plan was to repeat colonoscopy in 5 years  REVIEW OF SYSTEMS:    CONSTITUTIONAL: Denies any fever, chills, rigors, and weight loss  HEENT: No earache or tinnitus  Denies hearing loss or visual disturbances  CARDIOVASCULAR: No chest pain or palpitations  RESPIRATORY: Denies any cough, hemoptysis, shortness of breath or dyspnea on exertion  GASTROINTESTINAL: As noted in the History of Present Illness  GENITOURINARY: No problems with urination  Denies any hematuria or dysuria    NEUROLOGIC: No dizziness or vertigo, denies headaches  MUSCULOSKELETAL: Denies any muscle or joint pain  SKIN: Denies skin rashes or itching  ENDOCRINE: Denies excessive thirst  Denies intolerance to heat or cold  PSYCHOSOCIAL: Denies depression or anxiety  Denies any recent memory loss  Historical Information   Past Medical History:   Diagnosis Date    Acid reflux     Anxiety     RESOLVED:     Arthritis     Bipolar 2 disorder (HCC)     FOLLOWS WITH PSYCHIATRIST  CONTINUE LAMOTRIGINE; RESOLVED:     Depression     Familial tremor     both hands    Fibromyalgia     LAST ASSESSED:     Hearing aid worn     left ear    [place] (hard of hearing)     left ear    Hypertension     Left-sided weakness     Lower back pain     Memory loss of unknown cause     long and short term    Migraine     Overactive bladder     Panic attack     Post traumatic stress disorder     Seasonal allergies     Stroke [facility])     questionable stroke 2009    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    CONT ASA 81 MG DAILY; RESOLVED:     Urinary incontinence     Wears dentures     partial lower / full upper    Wears glasses      Past Surgical History:   Procedure Laterality Date    BACK SURGERY       SECTION      COLONOSCOPY      RESOLVED: [id number]    EAR SURGERY      HYSTERECTOMY      MYRINGOTOMY W/ TUBES Left     NECK SURGERY  2019    KY CYSTOURETHROSCOPY N/A 2016    Procedure: CYSTOSCOPY, BOTOX INJECTION;  Surgeon: Adnrea Curry MD;  Location: [facility];  Service: Gynecology    [place] PERCUT IMPLNT [address] Right 2020    Procedure: INSERTION THORACIC DORSAL COLUMN SPINAL CORD STIMULATOR PERCUTANEOUS W IMPLANTABLE PULSE GENERATOR, RIGHT;  Surgeon: Mila Garrett MD;  Location:  MAIN OR;  Service: Neurosurgery    [address] [place] [address]     Social History   Social History     Substance and Sexual Activity   Alcohol Use Not Currently    Comment: 2 x year; being a social drinker as per all scripts      Social History     Substance and Sexual Activity   Drug Use No     Social History     Tobacco Use   Smoking Status Never Smoker   Smokeless Tobacco Never Used     Family History   Problem Relation Age of Onset    Colon cancer Mother     Alzheimer's disease Father     Stroke Father     Colon cancer Brother     Bipolar disorder Brother     Breast cancer Maternal Aunt     Colon cancer Maternal Aunt     Heart disease Other     Diabetes Other     Hypertension Other     Seizures Son     Depression Paternal Grandfather     No Known Problems Sister     No Known Problems Brother     Thyroid disease Neg Hx        Meds/Allergies     Medications Prior to Admission   Medication    amLODIPine (NORVASC) 5 mg tablet    busPIRone (BUSPAR) 5 mg tablet    diclofenac sodium (VOLTAREN) 1 %    DULoxetine (CYMBALTA) 30 mg delayed release capsule    DULoxetine (CYMBALTA) 60 mg delayed release capsule    hydrOXYzine HCL (ATARAX) 50 mg tablet    lidocaine (LMX) 4 % cream    LORazepam (ATIVAN) 0 5 mg tablet    morphine (MS CONTIN) 15 mg 12 hr tablet    Multiple Vitamins-Minerals (multivitamin with minerals) tablet    naloxone (NARCAN) 4 mg/0 1 mL nasal spray    omeprazole (PriLOSEC) 20 mg delayed release capsule    oxybutynin (DITROPAN) 5 mg tablet    prazosin (MINIPRESS) 2 mg capsule    pregabalin (LYRICA) 150 mg capsule    propranolol (INDERAL) 20 mg tablet    QUEtiapine (SEROquel XR) 300 mg 24 hr tablet    traZODone (DESYREL) 150 mg tablet    Valbenazine Tosylate (Ingrezza) 80 MG CAPS    ascorbic acid (VITAMIN C) 500 MG tablet    cholecalciferol (VITAMIN D3) 1,000 units tablet    ferrous sulfate 324 (65 Fe) mg    folic acid (FOLVITE) 1 mg tablet    HYDROcodone-acetaminophen (NORCO) 5-325 mg per tablet    triamcinolone (KENALOG) 0 025 % cream     Current Facility-Administered Medications   Medication Dose Route Frequency  acetaminophen (TYLENOL) tablet 975 mg  975 mg Oral Q8H [address]    amLODIPine (NORVASC) tablet 5 mg  5 mg Oral Daily    ascorbic acid (VITAMIN C) tablet 500 mg  500 mg Oral BID    busPIRone (BUSPAR) tablet 5 mg  5 mg Oral TID    cholecalciferol (VITAMIN D3) tablet 1,000 Units  1,000 Units Oral Daily    diclofenac sodium (VOLTAREN) 1 % topical gel 4 g  4 g Topical 4x Daily    DULoxetine (CYMBALTA) delayed release capsule 30 mg  30 mg Oral Daily    DULoxetine (CYMBALTA) delayed release capsule 60 mg  60 mg Oral Daily    enoxaparin (LOVENOX) subcutaneous injection 40 mg  40 mg Subcutaneous Q24H KESHIA    ferrous sulfate tablet 325 mg  325 mg Oral Daily With Breakfast    HYDROmorphone (DILAUDID) injection 0 2 mg  0 2 mg Intravenous Q4H PRN    hydrOXYzine HCL (ATARAX) tablet 50 mg  50 mg Oral HS PRN    lidocaine (LIDODERM) 5 % patch 1 patch  1 patch Topical Daily    LORazepam (ATIVAN) tablet 0 5 mg  0 5 mg Oral Q8H PRN    morphine (MSIR) IR tablet 15 mg  15 mg Oral Q12H    naloxone (NARCAN) 0 04 mg/mL syringe 0 04 mg  0 04 mg Intravenous Q1MIN PRN    naproxen (NAPROSYN) tablet 250 mg  250 mg Oral BID With Meals    And    pantoprazole (PROTONIX) EC tablet 40 mg  40 mg Oral Daily Before Breakfast    ondansetron (ZOFRAN) injection 4 mg  4 mg Intravenous Q6H PRN    oxybutynin (DITROPAN) tablet 5 mg  5 mg Oral BID    oxyCODONE (ROXICODONE) IR tablet 2 5 mg  2 5 mg Oral Q4H PRN    oxyCODONE (ROXICODONE) IR tablet 5 mg  5 mg Oral Q4H PRN    polyethylene glycol (MIRALAX) packet 17 g  17 g Oral Daily PRN    prazosin (MINIPRESS) capsule 2 mg  2 mg Oral HS    pregabalin (LYRICA) capsule 150 mg  150 mg Oral TID    propranolol (INDERAL) tablet 20 mg  20 mg Oral BID    QUEtiapine (SEROquel XR) 24 hr tablet 300 mg  300 mg Oral HS    senna-docusate sodium (SENOKOT S) 8 6-50 mg per tablet 1 tablet  1 tablet Oral BID    traZODone (DESYREL) tablet 150 mg  150 mg Oral HS    Valbenazine Tosylate CAPS 80 mg  80 mg Oral Daily       No Known Allergies        Objective     Blood pressure 103/57, pulse 69, temperature 99 °F (37 2 °C), temperature source Temporal, resp  rate 18, height 5' 1" (1 549 m), weight 97 5 kg (214 lb 15 2 oz), SpO2 96 %, not currently breastfeeding  Body mass index is 40 61 kg/m²  Intake/Output Summary (Last 24 hours) at 9/5/2020 1641  Last data filed at 9/5/2020 0551  Gross per 24 hour   Intake 1170 ml   Output    Net 1170 ml         PHYSICAL EXAM:      General Appearance:   Alert, cooperative, no distress   HEENT:   Normocephalic, atraumatic, anicteric      Neck:  Supple, symmetrical, trachea midline   Lungs:   Clear to auscultation bilaterally; no rales, rhonchi or wheezing; respirations unlabored    Heart[de-identified]   Regular rate and rhythm; no murmur, rub, or gallop     Abdomen:   Soft, non-tender, non-distended; normal bowel sounds; no masses, no organomegaly    Genitalia:   Deferred    Rectal:   Deferred    Extremities:  No cyanosis, clubbing or edema    Pulses:  2+ and symmetric all extremities    Skin:  No jaundice, rashes, or lesions    Lymph nodes:  No palpable cervical lymphadenopathy        Lab Results:   Admission on 09/04/2020   Component Date Value    Troponin I 09/04/2020 <0 02     NT-proBNP 09/04/2020 57     D-Dimer, Quant 09/04/2020 0 62*    SARS-CoV-2 09/04/2020 Negative     Ventricular Rate 09/04/2020 58     Atrial Rate 09/04/2020 58     TN Interval 09/04/2020 130     QRSD Interval 09/04/2020 92     QT Interval 09/04/2020 420     QTC Interval 09/04/2020 412     P Axis 09/04/2020 51     QRS Axis 09/04/2020 93     T Wave Axis 09/04/2020 3     Sodium 09/05/2020 140     Potassium 09/05/2020 3 1*    Chloride 09/05/2020 104     CO2 09/05/2020 30     ANION GAP 09/05/2020 6     BUN 09/05/2020 15     Creatinine 09/05/2020 0 98     Glucose 09/05/2020 91     Calcium 09/05/2020 8 2*    eGFR 09/05/2020 75     WBC 09/05/2020 5 63     RBC 09/05/2020 4 42     Hemoglobin 09/05/2020 12 6     Hematocrit 09/05/2020 38 5     MCV 09/05/2020 87     MCH 09/05/2020 28 5     MCHC 09/05/2020 32 7     RDW 09/05/2020 13 4     MPV 09/05/2020 9 5     Platelets 29/40/2687 230     nRBC 09/05/2020 0     Neutrophils Relative 09/05/2020 65     Immat GRANS % 09/05/2020 0     Lymphocytes Relative 09/05/2020 21     Monocytes Relative 09/05/2020 9     Eosinophils Relative 09/05/2020 4     Basophils Relative 09/05/2020 1     Neutrophils Absolute 09/05/2020 3 68     Immature Grans Absolute 09/05/2020 0 01     Lymphocytes Absolute 09/05/2020 1 16     Monocytes Absolute 09/05/2020 0 50     Eosinophils Absolute 09/05/2020 0 25     Basophils Absolute 09/05/2020 0 03    Lab Requisition on 09/04/2020   Component Date Value    ABO Grouping 09/04/2020 O     Rh Factor 09/04/2020 Negative     Antibody Screen 09/04/2020 Negative     Specimen Expiration Date 09/04/2020 [id number]        Imaging Studies: I have personally reviewed pertinent imaging studies

## 2020-09-05 NOTE — PROGRESS NOTES
Progress Note - Samantha Felder 1965, 62 y o  female MRN: [id number]    Unit/Bed#: E5 -01 Encounter: [id number]    Primary Care Provider: Fawad Lambert MD   Date and time admitted to hospital: 9/4/2020 10:06 AM        * Ambulatory dysfunction  Assessment & Plan  -patient presented to the ER for evaluation of ambulatory dysfunction, inability to ambulate or weight bear on her right leg  -she noted this was secondary to worsening of her bilateral knee, but especially her right knee osteoarthritis, as well as worsening of her chronic low back pain  -outpatient records were reviewed and she is followed by the orthopedic surgery team with Dr Danika Lou, and Dr Mario Rankin  - PT and OT evaluation, case management consult for short-term rehab as patient feels she is not able to ambulate safely in her home environment  -conferred with orthopedic surgery for re-evaluation:    -as patient is not able to ambulate, perform her ADLs, return to her home environment, due to her pain she will require short-term rehab and pain control  She will require a greater than 2 midnight stay  Will change to inpatient status    Tardive dyskinesia  Assessment & Plan  -patient has a history of tardive dyskinesia  -continue home medication with Valbenazine Tosylate CAPS 80 mg    Insomnia  Assessment & Plan  - continue trazodone 150 mg at bedtime    Hypokalemia  Assessment & Plan  -repleted, recheck    Hypertension  Assessment & Plan  -patient has a history of essential hypertension  -continue home medications with propranolol 20 mg b i d , prazosin 2 mg daily, amlodipine 5 mg daily    Esophageal reflux  Assessment & Plan  -continue proton pump inhibitor    Bipolar II disorder (HCC)  Assessment & Plan  -Continue home medications with trazodone 150 mg at bedtime, Seroquel 300 mg q h s , Cymbalta 90 mg daily, BuSpar 5 mg t i d   -patient's mood is stable  She is smiling, interactive, and appropriate    No evidence of a manic or depressive phase   -continue home medications    Anxiety  Assessment & Plan  -continue home medications with hydroxyzine at bedtime and Ativan 0 5 mg p r n  T i d   -PA- website was queried, and there are no red flag  -continue Buspar 5mgt i d , Cymbalta 60 mg daily, Seroquel 100 mg q h s , and trazodone 150 mg at bedtime:  Doses were confirmed personally with patient at the bedside    Right knee pain  Assessment & Plan  - patient has a history of chronic bilateral knee pain secondary to osteoarthritis   -she follows with orthopedic surgery team in the office  -plan for surgery for knee replacement per Dr Hiral Hare Aug 2020 office note  -she presented with worsening right knee pain and inability to ambulate    - continue morphine 15 mg b i d   - for pain management will add Tylenol 975 Q 8 scheduled, naproxen 250 b i d  With meals, 2 5 mg oxycodone Q for mild pain and 5 mg Q 4 severe pain with Dilaudid 0 5 IV for breakthrough pain    Chronic bilateral low back pain with bilateral sciatica  Assessment & Plan  -patient has a history of chronic pain syndrome with continuous opioid dependence  -has a history of chronic low back pain radiating down into both lower extremities  -she follows with the neurosurgery team  -with most recently seen in the office 08/2020 for her 2 week postoperative checkup after an implantable thoracic spine stimulator  -she had previous lumbar surgery with post-laminectomy syndrome  -has 2 spinal stimulators implanted  -patient notes her chronic pain has worsened, resulting in ambulatory dysfunction  -continue her home MS ER 15 mg b i d  And Lyrica  -PA- website was queried, and there are no red flags  Patient was also prescribed Norco 5 mg p r n   Pain, which she states she is not currently taking    Dysphagia:  Patient complained of dysphagia  -she was evaluated by the speech therapist who was concerned about esophageal etiology  -GI team consulted      Patient currently has significant ambulatory dysfunction due to worsening of her chronic low back and bilateral leg pain  She notes she is not able to leave her room at home, ambulate, or perform her ADLs  Patient will need a safe discharge plan, to short-term rehab, she will require a greater than 2 midnight stay and will be placed on inpatient status  Family:  Called patient's son Leilani Lopez, and left a message for him to call my cell phone for update    Discussed with patient's nurse  Discussed with     VTE Pharmacologic Prophylaxis: Enoxaparin (Lovenox)  VTE Mechanical Prophylaxis: sequential compression device        Certification Statement: The patient will continue to require additional inpatient hospital stay due to need for further acute intervention for safe discharge plan, short-term rehab arrangement    Status: inpatient     ===================================================================    Subjective:  Patient notes she has a history of chronic back pain  She indicates it affects her middle back, lower back, and extends down both lower extremities  Patient relates that her pain has not improved after her spinal stimulator July 2020  She notes her pain has been getting progressively worse to the point where she is unable to ambulate in her house and unable to leave her room  She notes she has been walking with a walker  She also notes worsening pain in her right leg  Patient presented to the ER due to worsening pain, inability to ambulate, and inability to leave her room due to the pain  She notes the pain is identical to her chronic pain, however worse than usual   She notes chronic numbness and her toes bilaterally  She denies any new numbness or decreased sensation she had a she denies any new weakness  Patient denies any shortness of breath she did she denies any abdominal pain  She denies any nausea, vomiting, diarrhea  She is tolerating p o    She denies any dizziness or lightheadedness  Physical Exam:   Temp:  [97 5 °F (36 4 °C)-98 2 °F (36 8 °C)] 97 9 °F (36 6 °C)  HR:  [62-95] 66  Resp:  [17-18] 18  BP: ()/(54-94) 91/54    Gen:  Pleasant, non-tachypnic, non-dyspnic  Conversant  Heart: regular rate and rhythm, S1S2 present, no murmur, rub or gallop  Lungs: clear to ausculatation bilaterally  No wheezing, crackles, or rhonchi  No accessory muscle use or respiratory distress  Abd: soft, non-tender, non-distended  NABS, no guarding, rebound or peritoneal signs  Extremities: no clubbing, cyanosis or edema  2+pedal pulses bilaterally  Neuro: awake, alert and oriented  Fluent and goal directed speech  Bilateral upper extremity strength intact and symmetrical   Pain with movement of bilateral legs  Sensation intact to light touch bilateral lower extremities  Skin: warm and dry: no petechiae, purpura and rash  LABS:   Results from last 7 days   Lab Units 09/05/20  0644 09/04/20  0928   WBC Thousand/uL 5 63 3 68*   HEMOGLOBIN g/dL 12 6 13 4   HEMATOCRIT % 38 5 41 4   PLATELETS Thousands/uL 230 251     Results from last 7 days   Lab Units 09/05/20  0644 09/04/20  0928   POTASSIUM mmol/L 3 1* 3 1*   CHLORIDE mmol/L 104 104   CO2 mmol/L 30 31   BUN mg/dL 15 14   CREATININE mg/dL 0 98 0 94   CALCIUM mg/dL 8 2* 8 3       Hospital Data:    9/5:  X-ray right knee:  Pending    9/4:  CTA chest PE study  No acute intrathoracic pathology identified, consistent with x-rays   Specifically, no evidence of pulmonary embolism   Suspected reflux esophagitis   Multilevel thoracolumbar spinal fusion surgery  Scoliosis    9/4:  Chest x-ray:  No acute consolidation or congestion      ---------------------------------------------------------------------------------------------------------------  This note has been constructed using a voice recognition system

## 2020-09-05 NOTE — PLAN OF CARE
Problem: Potential for Falls  Goal: Patient will remain free of falls  Description: INTERVENTIONS:  - Assess patient frequently for physical needs  -  Identify cognitive and physical deficits and behaviors that affect risk of falls  -  [place] fall precautions as indicated by assessment   - Educate patient/family on patient safety including physical limitations  - Instruct patient to call for assistance with activity based on assessment  - Modify environment to reduce risk of injury  - Consider OT/PT consult to assist with strengthening/mobility  Outcome: Progressing     Problem: Nutrition/Hydration-ADULT  Goal: Nutrient/Hydration intake appropriate for improving, restoring or maintaining nutritional needs  Description: Monitor and assess patient's nutrition/hydration status for malnutrition  Collaborate with interdisciplinary team and initiate plan and interventions as ordered  Monitor patient's weight and dietary intake as ordered or per policy  Utilize nutrition screening tool and intervene as necessary  Determine patient's food preferences and provide high-protein, high-caloric foods as appropriate       INTERVENTIONS:  - Monitor oral intake, urinary output, labs, and treatment plans  - Assess nutrition and hydration status and recommend course of action  - Evaluate amount of meals eaten  - Assist patient with eating if necessary   - Allow adequate time for meals  - Recommend/ encourage appropriate diets, oral nutritional supplements, and vitamin/mineral supplements  - Order, calculate, and assess calorie counts as needed  - Recommend, monitor, and adjust tube feedings and TPN/PPN based on assessed needs  - Assess need for intravenous fluids  - Provide specific nutrition/hydration education as appropriate  - Include patient/family/caregiver in decisions related to nutrition  Outcome: Progressing     Problem: Prexisting or High Potential for Compromised Skin Integrity  Goal: Skin integrity is maintained or improved  Description: INTERVENTIONS:  - Identify patients at risk for skin breakdown  - Assess and monitor skin integrity  - Assess and monitor nutrition and hydration status  - Monitor labs   - Assess for incontinence   - Turn and reposition patient  - Assist with mobility/ambulation  - Relieve pressure over bony prominences  - Avoid friction and shearing  - Provide appropriate hygiene as needed including keeping skin clean and dry  - Evaluate need for skin moisturizer/barrier cream  - Collaborate with interdisciplinary team   - Patient/family teaching  - Consider wound care consult   Outcome: Progressing     Problem: PAIN - ADULT  Goal: Verbalizes/displays adequate comfort level or baseline comfort level  Description: Interventions:  - Encourage patient to monitor pain and request assistance  - Assess pain using appropriate pain scale  - Administer analgesics based on type and severity of pain and evaluate response  - Implement non-pharmacological measures as appropriate and evaluate response  - Consider cultural and social influences on pain and pain management  - Notify physician/advanced practitioner if interventions unsuccessful or patient reports new pain  Outcome: Progressing     Problem: INFECTION - ADULT  Goal: Absence or prevention of progression during hospitalization  Description: INTERVENTIONS:  - Assess and monitor for signs and symptoms of infection  - Monitor lab/diagnostic results  - Monitor all insertion sites, i e  indwelling lines, tubes, and drains  - Monitor endotracheal if appropriate and nasal secretions for changes in amount and color  - [place] appropriate cooling/warming therapies per order  - Administer medications as ordered  - Instruct and encourage patient and family to use good hand hygiene technique  - Identify and instruct in appropriate isolation precautions for identified infection/condition  Outcome: Progressing  Goal: Absence of fever/infection during neutropenic period  Description: INTERVENTIONS:  - Monitor WBC    Outcome: Progressing     Problem: SAFETY ADULT  Goal: Patient will remain free of falls  Description: INTERVENTIONS:  - Assess patient frequently for physical needs  -  Identify cognitive and physical deficits and behaviors that affect risk of falls    -  [place] fall precautions as indicated by assessment   - Educate patient/family on patient safety including physical limitations  - Instruct patient to call for assistance with activity based on assessment  - Modify environment to reduce risk of injury  - Consider OT/PT consult to assist with strengthening/mobility  Outcome: Progressing  Goal: Maintain or return to baseline ADL function  Description: INTERVENTIONS:  -  Assess patient's ability to carry out ADLs; assess patient's baseline for ADL function and identify physical deficits which impact ability to perform ADLs (bathing, care of mouth/teeth, toileting, grooming, dressing, etc )  - Assess/evaluate cause of self-care deficits   - Assess range of motion  - Assess patient's mobility; develop plan if impaired  - Assess patient's need for assistive devices and provide as appropriate  - Encourage maximum independence but intervene and supervise when necessary  - Involve family in performance of ADLs  - Assess for home care needs following discharge   - Consider OT consult to assist with ADL evaluation and planning for discharge  - Provide patient education as appropriate  Outcome: Progressing  Goal: Maintain or return mobility status to optimal level  Description: INTERVENTIONS:  - Assess patient's baseline mobility status (ambulation, transfers, stairs, etc )    - Identify cognitive and physical deficits and behaviors that affect mobility  - Identify mobility aids required to assist with transfers and/or ambulation (gait belt, sit-to-stand, lift, walker, cane, etc )  - [place] fall precautions as indicated by assessment  - Record patient progress and toleration of activity level on Mobility SBAR; progress patient to next Phase/Stage  - Instruct patient to call for assistance with activity based on assessment  - Consider rehabilitation consult to assist with strengthening/weightbearing, etc   Outcome: Progressing     Problem: DISCHARGE PLANNING  Goal: Discharge to home or other facility with appropriate resources  Description: INTERVENTIONS:  - Identify barriers to discharge w/patient and caregiver  - Arrange for needed discharge resources and transportation as appropriate  - Identify discharge learning needs (meds, wound care, etc )  - Arrange for interpretive services to assist at discharge as needed  - Refer to Case Management Department for coordinating discharge planning if the patient needs post-hospital services based on physician/advanced practitioner order or complex needs related to functional status, cognitive ability, or social support system  Outcome: Progressing     Problem: Knowledge Deficit  Goal: Patient/family/caregiver demonstrates understanding of disease process, treatment plan, medications, and discharge instructions  Description: Complete learning assessment and assess knowledge base    Interventions:  - Provide teaching at level of understanding  - Provide teaching via preferred learning methods  Outcome: Progressing     Problem: NEUROSENSORY - ADULT  Goal: Achieves stable or improved neurological status  Description: INTERVENTIONS  - Monitor and report changes in neurological status  - Monitor vital signs such as temperature, blood pressure, glucose, and any other labs ordered   - Initiate measures to prevent increased intracranial pressure  - Monitor for seizure activity and implement precautions if appropriate      Outcome: Progressing  Goal: Remains free of injury related to seizures activity  Description: INTERVENTIONS  - Maintain airway, patient safety  and administer oxygen as ordered  - Monitor patient for seizure activity, document and report duration and description of seizure to physician/advanced practitioner  - If seizure occurs,  ensure patient safety during seizure  - Reorient patient post seizure  - Seizure pads on all 4 side rails  - Instruct patient/family to notify RN of any seizure activity including if an aura is experienced  - Instruct patient/family to call for assistance with activity based on nursing assessment  - Administer anti-seizure medications if ordered    Outcome: Progressing  Goal: Achieves maximal functionality and self care  Description: INTERVENTIONS  - Monitor swallowing and airway patency with patient fatigue and changes in neurological status  - Encourage and assist patient to increase activity and self care     - Encourage visually impaired, hearing impaired and aphasic patients to use assistive/communication devices  Outcome: Progressing     Problem: CARDIOVASCULAR - ADULT  Goal: Maintains optimal cardiac output and hemodynamic stability  Description: INTERVENTIONS:  - Monitor I/O, vital signs and rhythm  - Monitor for S/S and trends of decreased cardiac output  - Administer and titrate ordered vasoactive medications to optimize hemodynamic stability  - Assess quality of pulses, skin color and temperature  - Assess for signs of decreased coronary artery perfusion  - Instruct patient to report change in severity of symptoms  Outcome: Progressing  Goal: Absence of cardiac dysrhythmias or at baseline rhythm  Description: INTERVENTIONS:  - Continuous cardiac monitoring, vital signs, obtain 12 lead EKG if ordered  - Administer antiarrhythmic and heart rate control medications as ordered  - Monitor electrolytes and administer replacement therapy as ordered  Outcome: Progressing     Problem: RESPIRATORY - ADULT  Goal: Achieves optimal ventilation and oxygenation  Description: INTERVENTIONS:  - Assess for changes in respiratory status  - Assess for changes in mentation and behavior  - Position to facilitate oxygenation and minimize respiratory effort  - Oxygen administered by appropriate delivery if ordered  - Initiate smoking cessation education as indicated  - Encourage broncho-pulmonary hygiene including cough, deep breathe, Incentive Spirometry  - Assess the need for suctioning and aspirate as needed  - Assess and instruct to report SOB or any respiratory difficulty  - Respiratory Therapy support as indicated  Outcome: Progressing     Problem: GASTROINTESTINAL - ADULT  Goal: Minimal or absence of nausea and/or vomiting  Description: INTERVENTIONS:  - Administer IV fluids if ordered to ensure adequate hydration  - Maintain NPO status until nausea and vomiting are resolved  - Nasogastric tube if ordered  - Administer ordered antiemetic medications as needed  - Provide nonpharmacologic comfort measures as appropriate  - Advance diet as tolerated, if ordered  - Consider nutrition services referral to assist patient with adequate nutrition and appropriate food choices  Outcome: Progressing  Goal: Maintains or returns to baseline bowel function  Description: INTERVENTIONS:  - Assess bowel function  - Encourage oral fluids to ensure adequate hydration  - Administer IV fluids if ordered to ensure adequate hydration  - Administer ordered medications as needed  - Encourage mobilization and activity  - Consider nutritional services referral to assist patient with adequate nutrition and appropriate food choices  Outcome: Progressing  Goal: Maintains adequate nutritional intake  Description: INTERVENTIONS:  - Monitor percentage of each meal consumed  - Identify factors contributing to decreased intake, treat as appropriate  - Assist with meals as needed  - Monitor I&O, weight, and lab values if indicated  - Obtain nutrition services referral as needed  Outcome: Progressing     Problem: GENITOURINARY - ADULT  Goal: Maintains or returns to baseline urinary function  Description: INTERVENTIONS:  - Assess urinary function  - Encourage oral fluids to ensure adequate hydration if ordered  - Administer IV fluids as ordered to ensure adequate hydration  - Administer ordered medications as needed  - Offer frequent toileting  - Follow urinary retention protocol if ordered  Outcome: Progressing  Goal: Absence of urinary retention  Description: INTERVENTIONS:  - Assess patients ability to void and empty bladder  - Monitor I/O  - Bladder scan as needed  - Discuss with physician/AP medications to alleviate retention as needed  - Discuss catheterization for long term situations as appropriate  Outcome: Progressing     Problem: METABOLIC, FLUID AND ELECTROLYTES - ADULT  Goal: Electrolytes maintained within normal limits  Description: INTERVENTIONS:  - Monitor labs and assess patient for signs and symptoms of electrolyte imbalances  - Administer electrolyte replacement as ordered  - Monitor response to electrolyte replacements, including repeat lab results as appropriate  - Instruct patient on fluid and nutrition as appropriate  Outcome: Progressing  Goal: Fluid balance maintained  Description: INTERVENTIONS:  - Monitor labs   - Monitor I/O and WT  - Instruct patient on fluid and nutrition as appropriate  - Assess for signs & symptoms of volume excess or deficit  Outcome: Progressing  Goal: Glucose maintained within target range  Description: INTERVENTIONS:  - Monitor Blood Glucose as ordered  - Assess for signs and symptoms of hyperglycemia and hypoglycemia  - Administer ordered medications to maintain glucose within target range  - Assess nutritional intake and initiate nutrition service referral as needed  Outcome: Progressing     Problem: SKIN/TISSUE INTEGRITY - ADULT  Goal: Skin integrity remains intact  Description: INTERVENTIONS  - Identify patients at risk for skin breakdown  - Assess and monitor skin integrity  - Assess and monitor nutrition and hydration status  - Monitor labs (i e  albumin)  - Assess for incontinence   - Turn and reposition patient  - Assist with mobility/ambulation  - Relieve pressure over bony prominences  - Avoid friction and shearing  - Provide appropriate hygiene as needed including keeping skin clean and dry  - Evaluate need for skin moisturizer/barrier cream  - Collaborate with interdisciplinary team (i e  Nutrition, Rehabilitation, etc )   - Patient/family teaching  Outcome: Progressing     Problem: SKIN/TISSUE INTEGRITY - ADULT  Goal: Skin integrity remains intact  Description: INTERVENTIONS  - Identify patients at risk for skin breakdown  - Assess and monitor skin integrity  - Assess and monitor nutrition and hydration status  - Monitor labs (i e  albumin)  - Assess for incontinence   - Turn and reposition patient  - Assist with mobility/ambulation  - Relieve pressure over bony prominences  - Avoid friction and shearing  - Provide appropriate hygiene as needed including keeping skin clean and dry  - Evaluate need for skin moisturizer/barrier cream  - Collaborate with interdisciplinary team (i e  Nutrition, Rehabilitation, etc )   - Patient/family teaching  Outcome: Progressing  Goal: Oral mucous membranes remain intact  Description: INTERVENTIONS  - Assess oral mucosa and hygiene practices  - Implement preventative oral hygiene regimen  - Implement oral medicated treatments as ordered  - Initiate Nutrition services referral as needed  Outcome: Progressing     Problem: HEMATOLOGIC - ADULT  Goal: Maintains hematologic stability  Description: INTERVENTIONS  - Assess for signs and symptoms of bleeding or hemorrhage  - Monitor labs  - Administer supportive blood products/factors as ordered and appropriate  Outcome: Progressing     Problem: MUSCULOSKELETAL - ADULT  Goal: Maintain or return mobility to safest level of function  Description: INTERVENTIONS:  - Assess patient's ability to carry out ADLs; assess patient's baseline for ADL function and identify physical deficits which impact ability to perform ADLs (bathing, care of mouth/teeth, toileting, grooming, dressing, etc )  - Assess/evaluate cause of self-care deficits   - Assess range of motion  - Assess patient's mobility  - Assess patient's need for assistive devices and provide as appropriate  - Encourage maximum independence but intervene and supervise when necessary  - Involve family in performance of ADLs  - Assess for home care needs following discharge   - Consider OT consult to assist with ADL evaluation and planning for discharge  - Provide patient education as appropriate  Outcome: Progressing  Goal: Maintain proper alignment of affected body part  Description: INTERVENTIONS:  - Support, maintain and protect limb and body alignment  - Provide patient/ family with appropriate education  Outcome: Progressing

## 2020-09-05 NOTE — CONSULTS
Orthopedics   Samantha CRISTIANO Guajardo 62 y o  female MRN: [id number]  Unit/Bed#: E5 -01      Chief Complaint:   right knee pain    HPI:   62 y  o female complaining of right knee pain  Patient has a known history of severe osteoarthritis in the right knee  She is known to our practice and has surgery for to right total knee arthroplasty scheduled in October with Dr Demetria Reese  Patient reports yesterday she was walking down stairs and fell down 2 stairs onto the side of the left knee  She began having severe pain in the right knee and inability to walk  She walks with a walker at baseline  She notes the pain is worse over the lateral and posterior knee anterior lateral as well  Pain is moderate to severe, intermittent, sharp  It is worse with attempted weight-bearing, slightly better with rest   She denies any radiation of the pain  Denies any increased or change numbness or tingling since the injury  No increased hip her ankle pain after the fall  No low back pain  Review Of Systems:   · Skin: Normal  · Neuro: See HPI  · Musculoskeletal: See HPI  · 14 point review of systems negative except as stated above     Past Medical History:   Past Medical History:   Diagnosis Date    Acid reflux     Anxiety     RESOLVED: [id number]    Arthritis     Bipolar 2 disorder (HCC)     FOLLOWS WITH PSYCHIATRIST  CONTINUE LAMOTRIGINE; RESOLVED: [id number]    Depression     Familial tremor     both hands    Fibromyalgia     LAST ASSESSED: [id number]    Hearing aid worn     left ear    [place] (hard of hearing)     left ear    Hypertension     Left-sided weakness     Lower back pain     Memory loss of unknown cause     long and short term    Migraine     Overactive bladder     Panic attack     Post traumatic stress disorder     Seasonal allergies     Stroke [facility])     questionable stroke 2009    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    CONT ASA 81 MG DAILY; RESOLVED: [id number]    Urinary incontinence     Wears dentures     partial lower / full upper    Wears glasses        Past Surgical History:   Past Surgical History:   Procedure Laterality Date    BACK SURGERY       SECTION      COLONOSCOPY      RESOLVED: [id number]    EAR SURGERY      HYSTERECTOMY  2004    MYRINGOTOMY W/ TUBES Left     NECK SURGERY  2019    TX CYSTOURETHROSCOPY N/A 2016    Procedure: CYSTOSCOPY, BOTOX INJECTION;  Surgeon: Humaira Ceballos MD;  Location: [facility];  Service: Gynecology    TX PERCUT IMPLNT [address] Right 2020    Procedure: INSERTION THORACIC DORSAL COLUMN SPINAL CORD STIMULATOR PERCUTANEOUS W IMPLANTABLE PULSE GENERATOR, RIGHT;  Surgeon: Rich Gomes MD;  Location: [facility];  Service: Neurosurgery    TONSILLECTOMY      TUBAL LIGATION         Family History:  Family history reviewed and non-contributory  Family History   Problem Relation Age of Onset    Colon cancer Mother     Alzheimer's disease Father     Stroke Father     Colon cancer Brother     Bipolar disorder Brother     Breast cancer Maternal Aunt     Colon cancer Maternal Aunt     Heart disease Other     Diabetes Other     Hypertension Other     Seizures Son     Depression Paternal Grandfather     No Known Problems Sister     No Known Problems Brother     Thyroid disease Neg Hx        Social History:  Social History     Socioeconomic History    Marital status:      Spouse name: None    Number of children: 2    Years of education: graduate school     Highest education level: None   Occupational History    Occupation: Disabled   Social Needs    Financial resource strain: Somewhat hard    Food insecurity     Worry: None     Inability: None    Transportation needs     Medical: None     Non-medical: None   Tobacco Use    Smoking status: Never Smoker    Smokeless tobacco: Never Used   Substance and Sexual Activity    Alcohol use: Not Currently     Comment: 2 x year; being a social drinker as per all scripts     Drug use: No    Sexual activity: None   Lifestyle    Physical activity     Days per week: None     Minutes per session: None    Stress: None   Relationships    Social connections     Talks on phone: None     Gets together: None     Attends Taoist service: None     Active member of club or organization: None     Attends meetings of clubs or organizations: None     Relationship status: None    Intimate partner violence     Fear of current or ex partner: None     Emotionally abused: None     Physically abused: None     Forced sexual activity: None   Other Topics Concern    None   Social History Narrative    Bereavement    Daily caffeine consumption, 6-8 servings per day     as per all scripts    Lives in [place]        Allergies:   No Known Allergies        Labs:  0   Lab Value Date/Time    HCT 38 5 09/05/2020 0644    HCT 41 4 09/04/2020 0928    HCT 39 2 07/08/2020 0841    HCT 41 9 10/26/2015 1123    HCT 39 9 02/22/2014 0615    HCT 38 2 02/21/2014 0533    HGB 12 6 09/05/2020 0644    HGB 13 4 09/04/2020 0928    HGB 12 5 07/08/2020 0841    HGB 14 9 10/26/2015 1123    HGB 13 7 02/22/2014 0615    HGB 13 0 02/21/2014 0533    INR 1 09 09/04/2020 0928    WBC 5 63 09/05/2020 0644    WBC 3 68 (L) 09/04/2020 0928    WBC 4 78 07/08/2020 0841    WBC 4 50 10/26/2015 1123    WBC 4 52 02/22/2014 0615    WBC 4 79 02/21/2014 0533    ESR 16 03/08/2020 2129    CRP 48 2 (H) 03/08/2020 2129       Meds:    Current Facility-Administered Medications:     acetaminophen (TYLENOL) tablet 975 mg, 975 mg, Oral, Q8H [facility], Carla Martinez DO, 975 mg at 09/05/20 0547    amLODIPine (NORVASC) tablet 5 mg, 5 mg, Oral, Daily, Sonny Garcia DO    ascorbic acid (VITAMIN C) tablet 500 mg, 500 mg, Oral, BID, Carla Martinez DO, 500 mg at 09/04/20 2052    busPIRone (BUSPAR) tablet 5 mg, 5 mg, Oral, TID, Carla Martinez DO, 5 mg at 09/04/20 2511    cholecalciferol (VITAMIN D3) tablet 1,000 Units, 1,000 Units, Oral, Daily, Eduar Krishnan DO    diclofenac sodium (VOLTAREN) 1 % topical gel 4 g, 4 g, Topical, 4x Daily, Carla Martinez DO    DULoxetine (CYMBALTA) delayed release capsule 30 mg, 30 mg, Oral, Daily, Eduar Krishnan,     DULoxetine (CYMBALTA) delayed release capsule 60 mg, 60 mg, Oral, Daily, Carla Martinez DO    enoxaparin (LOVENOX) subcutaneous injection 40 mg, 40 mg, Subcutaneous, Q24H [address], Carla Martinez DO    ferrous sulfate tablet 325 mg, 325 mg, Oral, Daily With Breakfast, Eduar Krishnan DO    HYDROmorphone (DILAUDID) injection 0 2 mg, 0 2 mg, Intravenous, Q4H PRN, Eduar Krishnan DO    hydrOXYzine HCL (ATARAX) tablet 50 mg, 50 mg, Oral, HS PRN, Eduar Krishnan DO    lidocaine (LIDODERM) 5 % patch 1 patch, 1 patch, Topical, Daily, Carla Martinez DO    LORazepam (ATIVAN) tablet 0 5 mg, 0 5 mg, Oral, Q8H PRN, Eduar Krishnan DO    morphine (MSIR) IR tablet 15 mg, 15 mg, Oral, Q12H, Carla Martinez DO, 15 mg at 09/05/20 0546    naloxone (NARCAN) 0 04 mg/mL syringe 0 04 mg, 0 04 mg, Intravenous, Q1MIN PRN, Eduar Krishnan DO    naproxen (NAPROSYN) tablet 250 mg, 250 mg, Oral, BID With Meals **AND** pantoprazole (PROTONIX) EC tablet 40 mg, 40 mg, Oral, Daily Before Breakfast, Carla Martinez DO, 40 mg at 09/05/20 0603    ondansetron (ZOFRAN) injection 4 mg, 4 mg, Intravenous, Q6H PRN, Eduar Krishnan DO    oxybutynin (DITROPAN) tablet 5 mg, 5 mg, Oral, BID, Carla Martinez DO, 5 mg at 09/04/20 2053    oxyCODONE (ROXICODONE) IR tablet 2 5 mg, 2 5 mg, Oral, Q4H PRN, Eduar Krishnan DO    oxyCODONE (ROXICODONE) IR tablet 5 mg, 5 mg, Oral, Q4H PRN, Eduar Krishnan DO, 5 mg at 09/05/20 0410    polyethylene glycol (MIRALAX) packet 17 g, 17 g, Oral, Daily PRN, Eduar Krishnan DO    prazosin (MINIPRESS) capsule 2 mg, 2 mg, Oral, HS, Carla Adends, DO, 2 mg at 09/04/20 2223    pregabalin (LYRICA) capsule 150 mg, 150 mg, Oral, TID, Carla Juan, DO, 150 mg at 09/04/20 2215    propranolol (INDERAL) tablet 20 mg, 20 mg, Oral, BID, Lokesh Cough, DO, 20 mg at 09/04/20 2053    QUEtiapine (SEROquel XR) 24 hr tablet 300 mg, 300 mg, Oral, HS, Carla Juan, DO, 300 mg at 09/04/20 2223    senna-docusate sodium (SENOKOT S) 8 6-50 mg per tablet 1 tablet, 1 tablet, Oral, BID, Carla Jaun, DO, 1 tablet at 09/04/20 2053    traZODone (DESYREL) tablet 150 mg, 150 mg, Oral, HS, Carla Juan, DO, 150 mg at 09/04/20 2215    Valbenazine Tosylate CAPS 80 mg, 80 mg, Oral, Daily, Lokesh Cough, DO    Blood Culture:   Lab Results   Component Value Date    BLOODCX No Growth After 5 Days  03/09/2020    BLOODCX No Growth After 5 Days  03/09/2020       Wound Culture:   No results found for: WOUNDCULT    Ins and Outs:  I/O last 24 hours: In: 1170 [P O :1170]  Out: -           Physical Exam:   BP 91/54 (BP Location: Right arm)   Pulse 66   Temp 97 9 °F (36 6 °C) (Tympanic)   Resp 18   Ht 5' 1" (1 549 m)   Wt 97 5 kg (214 lb 15 2 oz)   SpO2 98%   BMI 40 61 kg/m²   Gen: Alert and oriented to person, place, time  HEENT: EOMI, eyes clear, moist mucus membranes, hearing intact  Respiratory: Bilateral chest rise   No audible wheezing found  Cardiovascular: Regular Rate and Rhythm  Abdomen: soft nontender/nondistended  Musculoskeletal: right lower extremity  · Skin intact around the knee, no ecchymosis  · Patient does have valgus deformity in bilateral knees there is no acute bony deformity visible or palpable in the right knee  · Tender to palpation over lateral joint line worse than medial joint line, mild tenderness palpation over the patella no crepitus  · No palpable defect of the quadriceps or patellar tendons  · Trace effusion  · Can perform straight leg raise, knee extension intact, patient reports she is unable to flex the knee secondary to pain  · Stable to varus/valgus stress with mild laxity with varus stress but firm endpoint, negative lachmans, posterior drawer, equivocal Boris's test laterally  · Sensation intact L3-S1  · 5/5 strength to hip flexion/extension, knee extension, ankle dorsi/plantar flexion, EHL/FHL  · 2+ DP pulse    Radiology:   X-rays of right knee are pending    _*_*_*_*_*_*_*_*_*_*_*_*_*_*_*_*_*_*_*_*_*_*_*_*_*_*_*_*_*_*_*_*_*_*_*_*_*_*_*_*_*    Assessment:  62 y o  female with right knee pain status post reported fall down 2 steps yesterday in the setting of known severe lateral osteoarthritis    Plan:   · Nonweightbearing  right lower extremity until x-rays are obtained and reviewed  · PT/OT  · Pain control per primary team, recommend Tylenol, NSAIDs, ice, may try a muscle relaxant as well  · Based on the patient's reported injury and physical examination, do not suspect any acute fracture or ligamentous/tendon injury at this time  will wait to review x-rays and assuming no fractures identified will await PT evaluation as well  · Body mass index is 40 61 kg/m²  morbidly obese  Recommend behavior modifications, nutrition and physical activity    · Dispo: Ortho will follow      Herminia Ornelas PA-C

## 2020-09-05 NOTE — DISCHARGE INSTR - DIET
Regular diet and thin liquids, reflux precautions  Elevate head of bed at rest  Do not lie flat  Take sips of liquids in between bites  Avoid foods that are very dry or require lengthy chewing

## 2020-09-05 NOTE — SOCIAL WORK
CM worked with this pt in the ER yesterday to work on placement for SNF, unable to place directly from the ER: The patient is LOS day 1  She is not a bundle, she is not a re-admission  She is here for ambulatory dysfunction  She has a R TKR scheduled for 10/7 with Dr Finn Adler at [facility]  The patient reported falling at home yesterday  She lives with her daughter and her four grandchildren in a 2 and 3 story apartment, [address]  She is having difficulty navigating steps due to her b/l knee pain  She has a Rollator, a WC and canes in the home  She does not drive  She takes public transport for her appointments  She has not had VNA services at home as her daughter is concerned about COVID exposure w/having a  at home  [facility] of STR at [facility]  Her PCp is Dr Emilie Cazares  She has no formal POA, she has a son, Angelica Hough, and a daughter, Mandeep Callaway who live locally  No D&A use  She has MH dx of BiPolar disorder  - she follows with [facility] for mental health services, her  is Margaux Antonio [phone], HUNTER provided her with a medical update  Pt needs to be optioned

## 2020-09-05 NOTE — PHYSICAL THERAPY NOTE
Physical Therapy Progress Note     09/05/20 1151   Pain Assessment   Pain Assessment Tool 0-10   Pain Score 8   Pain Location/Orientation Orientation: Left; Location: Hip   Hospital Pain Intervention(s) Medication (See MAR); Repositioned; Ambulation/increased activity   Restrictions/Precautions   Weight Bearing Precautions Per Order No   Other Precautions Cognitive; Chair Alarm; Bed Alarm;Pain; Fall Risk   General   Chart Reviewed Yes   Response to Previous Treatment Patient with no complaints from previous session  Family/Caregiver Present No   Cognition   Overall Cognitive Status Impaired   Arousal/Participation Alert; Responsive; Cooperative   Attention Attends with cues to redirect   Orientation Level Oriented X4   Memory Unable to assess   Following Commands Follows one step commands with increased time or repetition   Bed Mobility   Supine to Sit 5  Supervision   Additional items HOB elevated; Bedrails; Increased time required   Sit to Supine 4  Minimal assistance   Additional items Assist x 1; Increased time required;LE management;Verbal cues; Bedrails;HOB elevated   Transfers   Sit to Stand 4  Minimal assistance   Additional items Assist x 1; Increased time required;Verbal cues; Bedrails   Stand to Sit 5  Supervision   Additional items Increased time required; Bedrails   Ambulation/Elevation   Gait pattern Improper Weight shift; Forward Flexion;Decreased foot clearance;Poor UE support; Excessively slow; Short stride; Shuffling   Gait Assistance 4  Minimal assist   Additional items Assist x 1;Verbal cues; Tactile cues   Assistive Device   (rollator)   Distance 5'   Activity Tolerance   Activity Tolerance Patient limited by fatigue;Patient limited by pain   Nurse Made Aware MARIA Glass ok to see   Assessment   Prognosis Fair   Problem List Decreased strength;Decreased range of motion; Impaired balance;Decreased endurance;Decreased mobility; Decreased coordination;Decreased cognition;Decreased safety awareness;Pain   Assessment Pt supine in bed upon arrival, reports high pain in b/l thighs, knees and hips  Pt requires supervision only for supine to sit once seated EOB pt amble to maintain balance unsupported for 15 min, once pts pain subsides pt ambulates with rollator 5' needing Min A x1 for sit to stand, pt ambulates with forward flexed posture leaning on rollator with forearms requires verbal and tactile cues to maintain support form UEs  Pt returned to supine in bed at end of session resting comfortably, able to boost herself toward [facility] for lunch  Pt will continue to benenfit from skilled Pt to increase strength, endurance and balance to maximize safe fucntional mobility  Barriers to Discharge Inaccessible home environment   Barriers to Discharge Comments FOS to enter   Goals   Patient Goals less pain   STG Expiration Date 09/18/20   Short Term Goal #1 1)  Pt will perform bed mobility with Alexa demonstrating appropriate technique 100% of the time in order to improve function  2)  Perform all transfers with Alexa demonstrating safe and appropriate technique 100% of the time in order to improve ability to negotiate safely in home environment  3) Amb with least restrictive AD > 50'x1 with mod I in order to demonstrate ability to negotiate in home environment  4)  Improve overall strength and balance 1/2 grade in order to optimize ability to perform functional tasks and reduce fall risk  5) Increase activity tolerance to 45 minutes in order to improve endurance to functional tasks  6)  Negotiate stairs using most appropriate technique and S in order to be able to negotiate safely in home environment  7) PT for ongoing patient and family/caregiver education, DME needs and d/c planning in order to promote highest level of function in least restrictive environment   Plan   Treatment/Interventions Functional transfer training;LE strengthening/ROM; Therapeutic exercise; Endurance training;Bed mobility;Gait training   Progress Slow progress, decreased activity tolerance   PT Frequency   (3-5x/week)   Recommendation   PT Discharge Recommendation Post-Acute Rehabilitation Services   Equipment Recommended Walker   PT - OK to Discharge Yes  (to STR when medically ready)     Burke Clark, PTA

## 2020-09-05 NOTE — ASSESSMENT & PLAN NOTE
-patient complained of dysphagia  -speech therapy team consulted who suspected esophageal etiology  -GI team consult appreciated:   For EGD tues  -cont PPI

## 2020-09-05 NOTE — ASSESSMENT & PLAN NOTE
- patient has a history of chronic bilateral knee pain secondary to osteoarthritis   -she follows with orthopedic surgery team in the office  -plan for surgery for knee replacement per Dr Franklin Stallworth Aug 2020 office note  -she presented with worsening right knee pain and inability to ambulate    - continue morphine 15 mg b i d   - for pain management will add Tylenol 975 Q 8 scheduled, naproxen 250 b i d   With meals, 2 5 mg oxycodone Q for mild pain and 5 mg Q 4 severe pain with Dilaudid 0 5 IV for breakthrough pain

## 2020-09-05 NOTE — PLAN OF CARE
Problem: PHYSICAL THERAPY ADULT  Goal: Performs mobility at highest level of function for planned discharge setting  See evaluation for individualized goals  Description: Treatment/Interventions: Functional transfer training, LE strengthening/ROM, Elevations, Therapeutic exercise, Endurance training, Cognitive reorientation, Patient/family training, Equipment eval/education, Bed mobility, Gait training, Compensatory technique education, Continued evaluation, Spoke to nursing, Spoke to case management, Spoke to advanced practitioner  Equipment Recommended: Dagmar Oreilly       See flowsheet documentation for full assessment, interventions and recommendations  Outcome: Progressing  Note: Prognosis: Fair  Problem List: Decreased strength, Decreased range of motion, Impaired balance, Decreased endurance, Decreased mobility, Decreased coordination, Decreased cognition, Decreased safety awareness, Pain  Assessment: Pt supine in bed upon arrival, reports high pain in b/l thighs, knees and hips  Pt requires supervision only for supine to sit once seated EOB pt amble to maintain balance unsupported for 15 min, once pts pain subsides pt ambulates with rollator 5' needing Min A x1 for sit to stand, pt ambulates with forward flexed posture leaning on rollator with forearms requires verbal and tactile cues to maintain support form UEs  Pt returned to supine in bed at end of session resting comfortably, able to boost herself toward [facility] for lunch  Pt will continue to benenfit from skilled Pt to increase strength, endurance and balance to maximize safe fucntional mobility  Barriers to Discharge: Inaccessible home environment  Barriers to Discharge Comments: FOS to enter  PT Discharge Recommendation: [phone]     PT - OK to Discharge: Yes(to STR when medically ready)    See flowsheet documentation for full assessment

## 2020-09-06 PROCEDURE — 97167 OT EVAL HIGH COMPLEX 60 MIN: CPT

## 2020-09-06 PROCEDURE — 99232 SBSQ HOSP IP/OBS MODERATE 35: CPT | Performed by: INTERNAL MEDICINE

## 2020-09-06 RX ORDER — POTASSIUM CHLORIDE 20 MEQ/1
40 TABLET, EXTENDED RELEASE ORAL ONCE
Status: COMPLETED | OUTPATIENT
Start: 2020-09-06 | End: 2020-09-06

## 2020-09-06 RX ORDER — LORATADINE ORAL 5 MG/5ML
10 SOLUTION ORAL DAILY
Status: DISCONTINUED | OUTPATIENT
Start: 2020-09-06 | End: 2020-09-09 | Stop reason: HOSPADM

## 2020-09-06 RX ORDER — PSEUDOEPHEDRINE HCL 60 MG/1
60 TABLET ORAL ONCE AS NEEDED
Status: DISCONTINUED | OUTPATIENT
Start: 2020-09-06 | End: 2020-09-06

## 2020-09-06 RX ORDER — PANTOPRAZOLE SODIUM 40 MG/1
40 TABLET, DELAYED RELEASE ORAL
Status: DISCONTINUED | OUTPATIENT
Start: 2020-09-07 | End: 2020-09-09 | Stop reason: HOSPADM

## 2020-09-06 RX ORDER — FLUTICASONE PROPIONATE 50 MCG
1 SPRAY, SUSPENSION (ML) NASAL DAILY
Status: DISCONTINUED | OUTPATIENT
Start: 2020-09-06 | End: 2020-09-09 | Stop reason: HOSPADM

## 2020-09-06 RX ORDER — KETOROLAC TROMETHAMINE 30 MG/ML
15 INJECTION, SOLUTION INTRAMUSCULAR; INTRAVENOUS ONCE
Status: COMPLETED | OUTPATIENT
Start: 2020-09-06 | End: 2020-09-06

## 2020-09-06 RX ADMIN — ACETAMINOPHEN 975 MG: 325 TABLET ORAL at 13:27

## 2020-09-06 RX ADMIN — PRAZOSIN HYDROCHLORIDE 2 MG: 2 CAPSULE ORAL at 22:18

## 2020-09-06 RX ADMIN — BUSPIRONE HYDROCHLORIDE 5 MG: 5 TABLET ORAL at 16:31

## 2020-09-06 RX ADMIN — OXYBUTYNIN CHLORIDE 5 MG: 5 TABLET ORAL at 09:06

## 2020-09-06 RX ADMIN — BUSPIRONE HYDROCHLORIDE 5 MG: 5 TABLET ORAL at 22:16

## 2020-09-06 RX ADMIN — TRAZODONE HYDROCHLORIDE 150 MG: 100 TABLET ORAL at 22:16

## 2020-09-06 RX ADMIN — NAPROXEN 250 MG: 250 TABLET ORAL at 09:06

## 2020-09-06 RX ADMIN — PREGABALIN 150 MG: 75 CAPSULE ORAL at 09:06

## 2020-09-06 RX ADMIN — LIDOCAINE 1 PATCH: 50 PATCH CUTANEOUS at 09:05

## 2020-09-06 RX ADMIN — OXYCODONE HYDROCHLORIDE AND ACETAMINOPHEN 500 MG: 500 TABLET ORAL at 09:06

## 2020-09-06 RX ADMIN — Medication 1000 UNITS: at 09:06

## 2020-09-06 RX ADMIN — MORPHINE SULFATE 15 MG: 15 TABLET ORAL at 05:24

## 2020-09-06 RX ADMIN — KETOROLAC TROMETHAMINE 15 MG: 30 INJECTION, SOLUTION INTRAMUSCULAR at 14:54

## 2020-09-06 RX ADMIN — DULOXETINE HYDROCHLORIDE 60 MG: 60 CAPSULE, DELAYED RELEASE ORAL at 09:05

## 2020-09-06 RX ADMIN — PANTOPRAZOLE SODIUM 40 MG: 40 TABLET, DELAYED RELEASE ORAL at 06:26

## 2020-09-06 RX ADMIN — PROPRANOLOL HYDROCHLORIDE 20 MG: 20 TABLET ORAL at 17:21

## 2020-09-06 RX ADMIN — SENNOSIDES AND DOCUSATE SODIUM 1 TABLET: 8.6; 5 TABLET ORAL at 09:06

## 2020-09-06 RX ADMIN — ENOXAPARIN SODIUM 40 MG: 40 INJECTION SUBCUTANEOUS at 09:05

## 2020-09-06 RX ADMIN — OXYCODONE HYDROCHLORIDE AND ACETAMINOPHEN 500 MG: 500 TABLET ORAL at 17:22

## 2020-09-06 RX ADMIN — ACETAMINOPHEN 975 MG: 325 TABLET ORAL at 22:16

## 2020-09-06 RX ADMIN — PREGABALIN 150 MG: 75 CAPSULE ORAL at 22:16

## 2020-09-06 RX ADMIN — DICLOFENAC SODIUM 4 G: 10 GEL TOPICAL at 09:05

## 2020-09-06 RX ADMIN — LORATADINE 10 MG: 5 SOLUTION ORAL at 16:31

## 2020-09-06 RX ADMIN — POTASSIUM CHLORIDE 40 MEQ: 1500 TABLET, EXTENDED RELEASE ORAL at 09:47

## 2020-09-06 RX ADMIN — PREGABALIN 150 MG: 75 CAPSULE ORAL at 16:31

## 2020-09-06 RX ADMIN — QUETIAPINE FUMARATE 300 MG: 300 TABLET, EXTENDED RELEASE ORAL at 22:17

## 2020-09-06 RX ADMIN — BUSPIRONE HYDROCHLORIDE 5 MG: 5 TABLET ORAL at 09:06

## 2020-09-06 RX ADMIN — OXYBUTYNIN CHLORIDE 5 MG: 5 TABLET ORAL at 17:22

## 2020-09-06 RX ADMIN — MORPHINE SULFATE 15 MG: 15 TABLET ORAL at 17:22

## 2020-09-06 RX ADMIN — DULOXETINE HYDROCHLORIDE 30 MG: 30 CAPSULE, DELAYED RELEASE ORAL at 09:06

## 2020-09-06 RX ADMIN — FLUTICASONE PROPIONATE 1 SPRAY: 50 SPRAY, METERED NASAL at 10:10

## 2020-09-06 RX ADMIN — SENNOSIDES AND DOCUSATE SODIUM 1 TABLET: 8.6; 5 TABLET ORAL at 17:22

## 2020-09-06 RX ADMIN — DICLOFENAC SODIUM 4 G: 10 GEL TOPICAL at 23:13

## 2020-09-06 RX ADMIN — FERROUS SULFATE TAB 325 MG (65 MG ELEMENTAL FE) 325 MG: 325 (65 FE) TAB at 09:05

## 2020-09-06 RX ADMIN — ACETAMINOPHEN 975 MG: 325 TABLET ORAL at 05:25

## 2020-09-06 NOTE — UTILIZATION REVIEW
Continued Stay Review  OBS 09-04-20 @ 1630 converted to inpatient 09-05-20 @ 1252 for continuation of care for ambulatory dysfunction, unsafe discharge and the need for placement     Inpatient Admission  Once      Transfer Service: General Medicine       Question  Answer  Comment    Admitting Physician  CHASIDY LOOMIS     Level of Care  Med Surg     Estimated length of stay  More than 2 Midnights     Certification  I certify that inpatient services are medically necessary for this patient for a duration of greater than two midnights  See H&P and MD Progress Notes for additional information about the patient's course of treatment  09/05/20 1252       Date: *09-06-20                         Current Patient Class: inpatient  Current Level of Care: medical    HPI:54 y o  female initially admitted on 09-04-20 as observation   09-05-20 inpatient     Assessment/Plan: patient is not able to ambulate, perform her ADLs, return to her home environment, due to her pain she will require short-term rehab and pain control  PT recommendation is STR  Placement was started 04-09-20  21 SNFs in the area, none able to accept directly from the ED  Her optioning letter from the [facility] is close to it's 180 day expiration from March, SNF's unsure if they can accept on this letter or if pt needs to be re-optioned, unfortunately, [facility] is not available until Tuesday to provide information      Pertinent Labs/Diagnostic Results:   Results from last 7 days   Lab Units 09/04/20  1559   SARS-COV-2  Negative     Results from last 7 days   Lab Units 09/05/20  0644 09/04/20  0928   WBC Thousand/uL 5 63 3 68*   HEMOGLOBIN g/dL 12 6 13 4   HEMATOCRIT % 38 5 41 4   PLATELETS Thousands/uL 230 251   NEUTROS ABS Thousands/µL 3 68 1 62*         Results from last 7 days   Lab Units 09/05/20  0644 09/04/20  0928   SODIUM mmol/L 140 143   POTASSIUM mmol/L 3 1* 3 1*   CHLORIDE mmol/L 104 104   CO2 mmol/L 30 31   ANION GAP mmol/L 6 8   BUN mg/dL 15 14   CREATININE mg/dL 0 98 0 94   EGFR ml/min/1 73sq m 75 78   CALCIUM mg/dL 8 2* 8 3     Results from last 7 days   Lab Units 09/04/20  0928   AST U/L 12   ALT U/L 17   ALK PHOS U/L 121*   TOTAL PROTEIN g/dL 7 2   ALBUMIN g/dL 3 4*   TOTAL BILIRUBIN mg/dL 0 88         Results from last 7 days   Lab Units 09/05/20  0644   GLUCOSE RANDOM mg/dL 91         Results from last 7 days   Lab Units 09/04/20  0922   HEMOGLOBIN A1C % 5 0   EAG mg/dl 97     Results from last 7 days   Lab Units 09/04/20  1100   TROPONIN I ng/mL <0 02     Results from last 7 days   Lab Units 09/04/20  1100   D-DIMER QUANTITATIVE ug/ml FEU 0 62*     Results from last 7 days   Lab Units 09/04/20  0928   PROTIME seconds 13 9   INR  1 09   PTT seconds 33     Results from last 7 days   Lab Units 09/04/20  0928   NT-PRO BNP pg/mL 57     Results from last 7 days   Lab Units 09/04/20  0928   FERRITIN ng/mL 52           Vital Signs:   Date/Time   Temp   Pulse   Resp   BP   MAP (mmHg)   SpO2   O2 Device   Patient Position - Orthostatic VS    09/06/20 0841   98 2 °F (36 8 °C)   79   18   109/64   85   95 %   None (Room air)   Lying    09/05/20 2307   98 5 °F (36 9 °C)   74   18   134/77      95 %   None (Room air)   Lying    09/05/20 2217            137/77                09/05/20 1559   99 °F (37 2 °C)   69   18   103/57   75   96 %   None (Room air)   Lying    09/05/20 0810   97 9 °F (36 6 °C)   66   18   91/54      98 %   None (Room air)   Lying - Orthostatic VS    09/04/20 2306   98 2 °F (36 8 °C)   63   18   132/73      95 %      Lying          Medications:   Scheduled Medications:  acetaminophen, 975 mg, Oral, Q8H KESHIA  amLODIPine, 5 mg, Oral, Daily  ascorbic acid, 500 mg, Oral, BID  busPIRone, 5 mg, Oral, TID  cholecalciferol, 1,000 Units, Oral, Daily  diclofenac sodium, 4 g, Topical, 4x Daily  DULoxetine, 30 mg, Oral, Daily  DULoxetine, 60 mg, Oral, Daily  enoxaparin, 40 mg, Subcutaneous, Q24H KESHIA  ferrous sulfate, 325 mg, Oral, Daily With Breakfast  fluticasone, 1 spray, Each Nare, Daily  lidocaine, 1 patch, Topical, Daily  morphine, 15 mg, Oral, Q12H  naproxen, 250 mg, Oral, BID With Meals    And  pantoprazole, 40 mg, Oral, Daily Before Breakfast  oxybutynin, 5 mg, Oral, BID  prazosin, 2 mg, Oral, HS  pregabalin, 150 mg, Oral, TID  propranolol, 20 mg, Oral, BID  QUEtiapine, 300 mg, Oral, HS  senna-docusate sodium, 1 tablet, Oral, BID  traZODone, 150 mg, Oral, HS  Valbenazine Tosylate, 80 mg, Oral, Daily      Continuous IV Infusions:     PRN Meds:  HYDROmorphone, 0 2 mg, Intravenous, Q4H PRN  hydrOXYzine HCL, 50 mg, Oral, HS PRN  LORazepam, 0 5 mg, Oral, Q8H PRN  naloxone, 0 04 mg, Intravenous, Q1MIN PRN  ondansetron, 4 mg, Intravenous, Q6H PRN  oxyCODONE, 2 5 mg, Oral, Q4H PRN  oxyCODONE, 5 mg, Oral, Q4H PRN  polyethylene glycol, 17 g, Oral, Daily PRN  pseudoephedrine, 60 mg, Oral, Once PRN        Discharge Plan: [facility]    Network Utilization Review Department  [email or web address]  ATTENTION: Please call with any questions or concerns to [phone] and carefully listen to the prompts so that you are directed to the right person  All voicemails are confidential   [place] Doing all requests for admission clinical reviews, approved or denied determinations and any other requests to dedicated fax number below belonging to the campus where the patient is receiving treatment  List of dedicated fax numbers for the Facilities:  FACILITY NAME UR FAX NUMBER   ADMISSION DENIALS (Administrative/Medical Necessity) [phone]   [address] (Maternity/NICU/Pediatrics) [phone]   Opal Handing [phone]   Manish Garcia [phone]   Jayashree Bass [phone]   Kiley Lock [address] [phone]   [facility] [phone]     [address] [phone]

## 2020-09-06 NOTE — PLAN OF CARE
Problem: OCCUPATIONAL THERAPY ADULT  Goal: Performs self-care activities at highest level of function for planned discharge setting  See evaluation for individualized goals  Description: Treatment Interventions: ADL retraining, Functional transfer training, UE strengthening/ROM, Endurance training, Cognitive reorientation, Patient/family training, Equipment evaluation/education, Compensatory technique education, Continued evaluation          See flowsheet documentation for full assessment, interventions and recommendations  Note: Limitation: Decreased ADL status, Decreased UE strength, Decreased Safe judgement during ADL, Decreased endurance, Decreased high-level ADLs  Prognosis: Fair  Assessment: Pt is a 55y/o female admitted to the hospital 2* symptoms of R knee pain, s/p fall, and an inability to ambulate; x-ray(R knee)=neg fx, severe OA  Currently pt is allowed activity as tolerated(per Dr Genie Gandhi)  Pt with PMH bipolar, depression, fibromyalgia, OA(scheduled for R TKR in 10/20), memory loss, panic attacks, and LBP--s/p spinal stimulator(8/20)  PTA pt states difficulty with her ADLs; states independence with her transfers, ambulation--with rollator, limited distances; +home alone, +falls=2, neg   During initial eval, pt demonstrated deficits with her functional balance, functional mobility, ADL status, activity tolerance(currently fair=15-20mins), b/l UE strength, and transfer safety  Pt would benefit from continued OT tx for the above deficits  3-5xwk/1-2wks        OT Discharge Recommendation: Post-Acute Rehabilitation Services

## 2020-09-06 NOTE — ASSESSMENT & PLAN NOTE
-patient presented to the ER for evaluation of ambulatory dysfunction, inability to ambulate or weight bear on her right leg  -she noted this was secondary to worsening of her bilateral knee, but especially her right knee osteoarthritis, as well as worsening of her chronic low back pain  -outpatient records were reviewed and she is followed by the orthopedic surgery team with Dr Zorita Claude, and Dr Erlinda Durán  - PT and OT evaluation, case management consult for short-term rehab as patient feels she is not able to ambulate safely in her home environment  -conferred with orthopedic surgery for re-evaluation: no indication for urgent surgical intervention    -as patient is not able to ambulate, perform her ADLs, return to her home environment, due to her pain she will require short-term rehab and pain control

## 2020-09-06 NOTE — ASSESSMENT & PLAN NOTE
-patient has a history of chronic pain syndrome with continuous opioid dependence  -has a history of chronic low back pain radiating down into both lower extremities  -she follows with the neurosurgery team  -with most recently seen in the office 08/2020 for her 2 week postoperative checkup after an implantable thoracic spine stimulator  -she had previous lumbar surgery with post-laminectomy syndrome  -has 2 spinal stimulators implanted  -patient notes her chronic pain has worsened, resulting in ambulatory dysfunction  -continue her home MS ER 15 mg b i d  And Lyrica  -PA- website was queried, and there are no red flags  Patient was also prescribed Norco 5 mg p r n   Pain, which she states she is not currently taking  -toradol prn sparingly

## 2020-09-06 NOTE — PROGRESS NOTES
Progress Note - Samantha Grande Getting 1965, 62 y o  female MRN: [id number]    Unit/Bed#: E5 -01 Encounter: [id number]    Primary Care Provider: Cristian Minaya MD   Date and time admitted to hospital: 9/4/2020 10:06 AM        * Ambulatory dysfunction  Assessment & Plan  -patient presented to the ER for evaluation of ambulatory dysfunction, inability to ambulate or weight bear on her right leg  -she noted this was secondary to worsening of her bilateral knee, but especially her right knee osteoarthritis, as well as worsening of her chronic low back pain  -outpatient records were reviewed and she is followed by the orthopedic surgery team with Dr Mehdi Clark, and Dr Erinn Goins  - PT and OT evaluation, case management consult for short-term rehab as patient feels she is not able to ambulate safely in her home environment  -conferred with orthopedic surgery for re-evaluation: no indication for urgent surgical intervention    -as patient is not able to ambulate, perform her ADLs, return to her home environment, due to her pain she will require short-term rehab and pain control  Dysphagia  Assessment & Plan  -patient complained of dysphagia  -speech therapy team consulted who suspected esophageal etiology  -GI team consult appreciated:   For EGD tues  -cont PPI    Tardive dyskinesia  Assessment & Plan  -patient has a history of tardive dyskinesia  -continue home medication with Valbenazine Tosylate CAPS 80 mg    Insomnia  Assessment & Plan  - continue trazodone 150 mg at bedtime    Hypokalemia  Assessment & Plan  -repleted, and normalized    Hypertension  Assessment & Plan  -patient has a history of essential hypertension  -continue home medications with propranolol 20 mg b i d , prazosin 2 mg daily, amlodipine 5 mg daily    Esophageal reflux  Assessment & Plan  -continue proton pump inhibitor    Bipolar II disorder (HCC)  Assessment & Plan  -Continue home medications with trazodone 150 mg at bedtime, Seroquel 300 mg q h s , Cymbalta 90 mg daily, BuSpar 5 mg t i d   -patient's mood is stable  She is smiling, interactive, and appropriate  No evidence of a manic or depressive phase   -continue home medications    Anxiety  Assessment & Plan  -continue home medications with hydroxyzine at bedtime and Ativan 0 5 mg p r n  T i d   -PA- website was queried, and there are no red flags  -continue Buspar 5mgt i d , Cymbalta 60 mg daily, Seroquel 100 mg q h s , and trazodone 150 mg at bedtime:  Doses were confirmed personally with patient at the bedside    Right knee pain  Assessment & Plan  - patient has a history of chronic bilateral knee pain secondary to osteoarthritis   -she follows with orthopedic surgery team in the office  -plan for surgery for knee replacement per Dr Angle Ruelas Aug 2020 office note  -she presented with worsening right knee pain and inability to ambulate  -xray without acute fracture  - continue morphine ER 15 mg b i d - home basal pain med  - for pain management will add Tylenol 975 Q 8 scheduled,   -one time dose toradol  -cont oxy 2 5mg prn pain    Chronic bilateral low back pain with bilateral sciatica  Assessment & Plan  -patient has a history of chronic pain syndrome with continuous opioid dependence  -has a history of chronic low back pain radiating down into both lower extremities  -she follows with the neurosurgery team  -with most recently seen in the office 08/2020 for her 2 week postoperative checkup after an implantable thoracic spine stimulator  -she had previous lumbar surgery with post-laminectomy syndrome  -has 2 spinal stimulators implanted  -patient notes her chronic pain has worsened, resulting in ambulatory dysfunction  -continue her home MS ER 15 mg b i d  And Lyrica  -PA- website was queried, and there are no red flags  Patient was also prescribed Norco 5 mg p r n   Pain, which she states she is not currently taking  -toradol prn sparingly          Family:  Called pt's son Denisse Leyva Yaquelin Atkins and left another message to call my cell phone for update  Called pt's second contact- daughter Sven Muñoz- number not in service    D/w pt's nurse        VTE Pharmacologic Prophylaxis: Enoxaparin (Lovenox)  VTE Mechanical Prophylaxis: sequential compression device        Certification Statement: The patient will continue to require additional inpatient hospital stay due to need for further acute intervention for ambulatory dysfunction, dysphagia for EGD tues    Status: inpatient     ===================================================================    Subjective:  Patient notes she still has pain in her middle and lower back going down both legs  She notes this is her chronic pain  She denies any new pain  She is requesting IV pain medication to help her pain  She denies any chest pain  She notes nasal congestion, and chest congestion  She denies any cough  She denies any nausea or vomiting  She is tolerating p o  However notes a sensation of food getting stuck when she swallows  She is able to swallow liquids without any difficulty  She denies any abdominal pain, or chest pain  Denies any diarrhea  Physical Exam:   Temp:  [98 2 °F (36 8 °C)-99 °F (37 2 °C)] 98 2 °F (36 8 °C)  HR:  [69-79] 79  Resp:  [18] 18  BP: (103-137)/(57-77) 109/64    Gen:  Pleasant, non-tachypnic, non-dyspnic  Conversant  Sitting up in a chair at the bedside  Heart: regular rate and rhythm, S1S2 present, no murmur, rub or gallop  Lungs: clear to ausculatation bilaterally  No wheezing, crackles, or rhonchi  No accessory muscle use or respiratory distress  No stridor or wheezing  Good air movement  No splinting  Abd: soft, non-tender, non-distended  NABS, no guarding, rebound or peritoneal signs  Extremities: no clubbing, cyanosis or edema  2+pedal pulses bilaterally  Full range of motion  Neuro: awake, alert  Fluent speech  Moving all 4 extremities    Able to flex both hips without any significant pain, while seated in the chair    Skin: warm and dry: no petechiae, purpura and rash  LABS:   Results from last 7 days   Lab Units 09/05/20  0644 09/04/20 0928   WBC Thousand/uL 5 63 3 68*   HEMOGLOBIN g/dL 12 6 13 4   HEMATOCRIT % 38 5 41 4   PLATELETS Thousands/uL 230 251     Results from last 7 days   Lab Units 09/05/20  0644 09/04/20  0928   POTASSIUM mmol/L 3 1* 3 1*   CHLORIDE mmol/L 104 104   CO2 mmol/L 30 31   BUN mg/dL 15 14   CREATININE mg/dL 0 98 0 94   CALCIUM mg/dL 8 2* 8 3       Hospital Data:  9/5:  X-ray right knee: Moderate right knee osteoarthritis with genu varus  No evidence of fracture     9/4:  CTA chest PE study  No acute intrathoracic pathology identified, consistent with x-rays   Specifically, no evidence of pulmonary embolism   Suspected reflux esophagitis   Multilevel thoracolumbar spinal fusion surgery   Scoliosis     9/4:  Chest x-ray:  No acute consolidation or congestion          ---------------------------------------------------------------------------------------------------------------  This note has been constructed using a voice recognition system

## 2020-09-06 NOTE — PLAN OF CARE
Problem: Potential for Falls  Goal: Patient will remain free of falls  Description: INTERVENTIONS:  - Assess patient frequently for physical needs  -  Identify cognitive and physical deficits and behaviors that affect risk of falls  -  [place] fall precautions as indicated by assessment   - Educate patient/family on patient safety including physical limitations  - Instruct patient to call for assistance with activity based on assessment  - Modify environment to reduce risk of injury  - Consider OT/PT consult to assist with strengthening/mobility  Outcome: Progressing     Problem: Nutrition/Hydration-ADULT  Goal: Nutrient/Hydration intake appropriate for improving, restoring or maintaining nutritional needs  Description: Monitor and assess patient's nutrition/hydration status for malnutrition  Collaborate with interdisciplinary team and initiate plan and interventions as ordered  Monitor patient's weight and dietary intake as ordered or per policy  Utilize nutrition screening tool and intervene as necessary  Determine patient's food preferences and provide high-protein, high-caloric foods as appropriate       INTERVENTIONS:  - Monitor oral intake, urinary output, labs, and treatment plans  - Assess nutrition and hydration status and recommend course of action  - Evaluate amount of meals eaten  - Assist patient with eating if necessary   - Allow adequate time for meals  - Recommend/ encourage appropriate diets, oral nutritional supplements, and vitamin/mineral supplements  - Order, calculate, and assess calorie counts as needed  - Recommend, monitor, and adjust tube feedings and TPN/PPN based on assessed needs  - Assess need for intravenous fluids  - Provide specific nutrition/hydration education as appropriate  - Include patient/family/caregiver in decisions related to nutrition  Outcome: Progressing     Problem: Prexisting or High Potential for Compromised Skin Integrity  Goal: Skin integrity is maintained or improved  Description: INTERVENTIONS:  - Identify patients at risk for skin breakdown  - Assess and monitor skin integrity  - Assess and monitor nutrition and hydration status  - Monitor labs   - Assess for incontinence   - Turn and reposition patient  - Assist with mobility/ambulation  - Relieve pressure over bony prominences  - Avoid friction and shearing  - Provide appropriate hygiene as needed including keeping skin clean and dry  - Evaluate need for skin moisturizer/barrier cream  - Collaborate with interdisciplinary team   - Patient/family teaching  - Consider wound care consult   Outcome: Progressing     Problem: PAIN - ADULT  Goal: Verbalizes/displays adequate comfort level or baseline comfort level  Description: Interventions:  - Encourage patient to monitor pain and request assistance  - Assess pain using appropriate pain scale  - Administer analgesics based on type and severity of pain and evaluate response  - Implement non-pharmacological measures as appropriate and evaluate response  - Consider cultural and social influences on pain and pain management  - Notify physician/advanced practitioner if interventions unsuccessful or patient reports new pain  Outcome: Progressing     Problem: INFECTION - ADULT  Goal: Absence or prevention of progression during hospitalization  Description: INTERVENTIONS:  - Assess and monitor for signs and symptoms of infection  - Monitor lab/diagnostic results  - Monitor all insertion sites, i e  indwelling lines, tubes, and drains  - Monitor endotracheal if appropriate and nasal secretions for changes in amount and color  - [place] appropriate cooling/warming therapies per order  - Administer medications as ordered  - Instruct and encourage patient and family to use good hand hygiene technique  - Identify and instruct in appropriate isolation precautions for identified infection/condition  Outcome: Progressing  Goal: Absence of fever/infection during neutropenic period  Description: INTERVENTIONS:  - Monitor WBC    Outcome: Progressing     Problem: SAFETY ADULT  Goal: Patient will remain free of falls  Description: INTERVENTIONS:  - Assess patient frequently for physical needs  -  Identify cognitive and physical deficits and behaviors that affect risk of falls    -  [place] fall precautions as indicated by assessment   - Educate patient/family on patient safety including physical limitations  - Instruct patient to call for assistance with activity based on assessment  - Modify environment to reduce risk of injury  - Consider OT/PT consult to assist with strengthening/mobility  Outcome: Progressing  Goal: Maintain or return to baseline ADL function  Description: INTERVENTIONS:  -  Assess patient's ability to carry out ADLs; assess patient's baseline for ADL function and identify physical deficits which impact ability to perform ADLs (bathing, care of mouth/teeth, toileting, grooming, dressing, etc )  - Assess/evaluate cause of self-care deficits   - Assess range of motion  - Assess patient's mobility; develop plan if impaired  - Assess patient's need for assistive devices and provide as appropriate  - Encourage maximum independence but intervene and supervise when necessary  - Involve family in performance of ADLs  - Assess for home care needs following discharge   - Consider OT consult to assist with ADL evaluation and planning for discharge  - Provide patient education as appropriate  Outcome: Progressing  Goal: Maintain or return mobility status to optimal level  Description: INTERVENTIONS:  - Assess patient's baseline mobility status (ambulation, transfers, stairs, etc )    - Identify cognitive and physical deficits and behaviors that affect mobility  - Identify mobility aids required to assist with transfers and/or ambulation (gait belt, sit-to-stand, lift, walker, cane, etc )  - [place] fall precautions as indicated by assessment  - Record patient progress and toleration of activity level on Mobility SBAR; progress patient to next Phase/Stage  - Instruct patient to call for assistance with activity based on assessment  - Consider rehabilitation consult to assist with strengthening/weightbearing, etc   Outcome: Progressing     Problem: DISCHARGE PLANNING  Goal: Discharge to home or other facility with appropriate resources  Description: INTERVENTIONS:  - Identify barriers to discharge w/patient and caregiver  - Arrange for needed discharge resources and transportation as appropriate  - Identify discharge learning needs (meds, wound care, etc )  - Arrange for interpretive services to assist at discharge as needed  - Refer to Case Management Department for coordinating discharge planning if the patient needs post-hospital services based on physician/advanced practitioner order or complex needs related to functional status, cognitive ability, or social support system  Outcome: Progressing     Problem: Knowledge Deficit  Goal: Patient/family/caregiver demonstrates understanding of disease process, treatment plan, medications, and discharge instructions  Description: Complete learning assessment and assess knowledge base    Interventions:  - Provide teaching at level of understanding  - Provide teaching via preferred learning methods  Outcome: Progressing     Problem: NEUROSENSORY - ADULT  Goal: Achieves stable or improved neurological status  Description: INTERVENTIONS  - Monitor and report changes in neurological status  - Monitor vital signs such as temperature, blood pressure, glucose, and any other labs ordered   - Initiate measures to prevent increased intracranial pressure  - Monitor for seizure activity and implement precautions if appropriate      Outcome: Progressing  Goal: Remains free of injury related to seizures activity  Description: INTERVENTIONS  - Maintain airway, patient safety  and administer oxygen as ordered  - Monitor patient for seizure activity, document and report duration and description of seizure to physician/advanced practitioner  - If seizure occurs,  ensure patient safety during seizure  - Reorient patient post seizure  - Seizure pads on all 4 side rails  - Instruct patient/family to notify RN of any seizure activity including if an aura is experienced  - Instruct patient/family to call for assistance with activity based on nursing assessment  - Administer anti-seizure medications if ordered    Outcome: Progressing  Goal: Achieves maximal functionality and self care  Description: INTERVENTIONS  - Monitor swallowing and airway patency with patient fatigue and changes in neurological status  - Encourage and assist patient to increase activity and self care     - Encourage visually impaired, hearing impaired and aphasic patients to use assistive/communication devices  Outcome: Progressing     Problem: CARDIOVASCULAR - ADULT  Goal: Maintains optimal cardiac output and hemodynamic stability  Description: INTERVENTIONS:  - Monitor I/O, vital signs and rhythm  - Monitor for S/S and trends of decreased cardiac output  - Administer and titrate ordered vasoactive medications to optimize hemodynamic stability  - Assess quality of pulses, skin color and temperature  - Assess for signs of decreased coronary artery perfusion  - Instruct patient to report change in severity of symptoms  Outcome: Progressing  Goal: Absence of cardiac dysrhythmias or at baseline rhythm  Description: INTERVENTIONS:  - Continuous cardiac monitoring, vital signs, obtain 12 lead EKG if ordered  - Administer antiarrhythmic and heart rate control medications as ordered  - Monitor electrolytes and administer replacement therapy as ordered  Outcome: Progressing     Problem: RESPIRATORY - ADULT  Goal: Achieves optimal ventilation and oxygenation  Description: INTERVENTIONS:  - Assess for changes in respiratory status  - Assess for changes in mentation and behavior  - Position to facilitate oxygenation and minimize respiratory effort  - Oxygen administered by appropriate delivery if ordered  - Initiate smoking cessation education as indicated  - Encourage broncho-pulmonary hygiene including cough, deep breathe, Incentive Spirometry  - Assess the need for suctioning and aspirate as needed  - Assess and instruct to report SOB or any respiratory difficulty  - Respiratory Therapy support as indicated  Outcome: Progressing     Problem: GASTROINTESTINAL - ADULT  Goal: Minimal or absence of nausea and/or vomiting  Description: INTERVENTIONS:  - Administer IV fluids if ordered to ensure adequate hydration  - Maintain NPO status until nausea and vomiting are resolved  - Nasogastric tube if ordered  - Administer ordered antiemetic medications as needed  - Provide nonpharmacologic comfort measures as appropriate  - Advance diet as tolerated, if ordered  - Consider nutrition services referral to assist patient with adequate nutrition and appropriate food choices  Outcome: Progressing  Goal: Maintains or returns to baseline bowel function  Description: INTERVENTIONS:  - Assess bowel function  - Encourage oral fluids to ensure adequate hydration  - Administer IV fluids if ordered to ensure adequate hydration  - Administer ordered medications as needed  - Encourage mobilization and activity  - Consider nutritional services referral to assist patient with adequate nutrition and appropriate food choices  Outcome: Progressing  Goal: Maintains adequate nutritional intake  Description: INTERVENTIONS:  - Monitor percentage of each meal consumed  - Identify factors contributing to decreased intake, treat as appropriate  - Assist with meals as needed  - Monitor I&O, weight, and lab values if indicated  - Obtain nutrition services referral as needed  Outcome: Progressing     Problem: GENITOURINARY - ADULT  Goal: Maintains or returns to baseline urinary function  Description: INTERVENTIONS:  - Assess urinary function  - Encourage oral fluids to ensure adequate hydration if ordered  - Administer IV fluids as ordered to ensure adequate hydration  - Administer ordered medications as needed  - Offer frequent toileting  - Follow urinary retention protocol if ordered  Outcome: Progressing  Goal: Absence of urinary retention  Description: INTERVENTIONS:  - Assess patients ability to void and empty bladder  - Monitor I/O  - Bladder scan as needed  - Discuss with physician/AP medications to alleviate retention as needed  - Discuss catheterization for long term situations as appropriate  Outcome: Progressing     Problem: METABOLIC, FLUID AND ELECTROLYTES - ADULT  Goal: Electrolytes maintained within normal limits  Description: INTERVENTIONS:  - Monitor labs and assess patient for signs and symptoms of electrolyte imbalances  - Administer electrolyte replacement as ordered  - Monitor response to electrolyte replacements, including repeat lab results as appropriate  - Instruct patient on fluid and nutrition as appropriate  Outcome: Progressing  Goal: Fluid balance maintained  Description: INTERVENTIONS:  - Monitor labs   - Monitor I/O and WT  - Instruct patient on fluid and nutrition as appropriate  - Assess for signs & symptoms of volume excess or deficit  Outcome: Progressing  Goal: Glucose maintained within target range  Description: INTERVENTIONS:  - Monitor Blood Glucose as ordered  - Assess for signs and symptoms of hyperglycemia and hypoglycemia  - Administer ordered medications to maintain glucose within target range  - Assess nutritional intake and initiate nutrition service referral as needed  Outcome: Progressing     Problem: SKIN/TISSUE INTEGRITY - ADULT  Goal: Skin integrity remains intact  Description: INTERVENTIONS  - Identify patients at risk for skin breakdown  - Assess and monitor skin integrity  - Assess and monitor nutrition and hydration status  - Monitor labs (i e  albumin)  - Assess for incontinence   - Turn and reposition patient  - Assist with mobility/ambulation  - Relieve pressure over bony prominences  - Avoid friction and shearing  - Provide appropriate hygiene as needed including keeping skin clean and dry  - Evaluate need for skin moisturizer/barrier cream  - Collaborate with interdisciplinary team (i e  Nutrition, Rehabilitation, etc )   - Patient/family teaching  Outcome: Progressing  Goal: Oral mucous membranes remain intact  Description: INTERVENTIONS  - Assess oral mucosa and hygiene practices  - Implement preventative oral hygiene regimen  - Implement oral medicated treatments as ordered  - Initiate Nutrition services referral as needed  Outcome: Progressing     Problem: HEMATOLOGIC - ADULT  Goal: Maintains hematologic stability  Description: INTERVENTIONS  - Assess for signs and symptoms of bleeding or hemorrhage  - Monitor labs  - Administer supportive blood products/factors as ordered and appropriate  Outcome: Progressing     Problem: MUSCULOSKELETAL - ADULT  Goal: Maintain or return mobility to safest level of function  Description: INTERVENTIONS:  - Assess patient's ability to carry out ADLs; assess patient's baseline for ADL function and identify physical deficits which impact ability to perform ADLs (bathing, care of mouth/teeth, toileting, grooming, dressing, etc )  - Assess/evaluate cause of self-care deficits   - Assess range of motion  - Assess patient's mobility  - Assess patient's need for assistive devices and provide as appropriate  - Encourage maximum independence but intervene and supervise when necessary  - Involve family in performance of ADLs  - Assess for home care needs following discharge   - Consider OT consult to assist with ADL evaluation and planning for discharge  - Provide patient education as appropriate  Outcome: Progressing  Goal: Maintain proper alignment of affected body part  Description: INTERVENTIONS:  - Support, maintain and protect limb and body alignment  - Provide patient/ family with appropriate education  Outcome: Progressing

## 2020-09-06 NOTE — ASSESSMENT & PLAN NOTE
- patient has a history of chronic bilateral knee pain secondary to osteoarthritis   -she follows with orthopedic surgery team in the office  -plan for surgery for knee replacement per Dr Marcell King Aug 2020 office note  -she presented with worsening right knee pain and inability to ambulate  -xray without acute fracture  - continue morphine ER 15 mg b i d - home basal pain med  - for pain management will add Tylenol 975 Q 8 scheduled,   -one time dose toradol  -cont oxy 2 5mg prn pain

## 2020-09-06 NOTE — OCCUPATIONAL THERAPY NOTE
Occupational Therapy Evaluation(time=4239-1379)     Patient Name: Jonh Mccann  CJLAB'X Date: 2020  Problem List  Principal Problem:    Ambulatory dysfunction  Active Problems:    Chronic bilateral low back pain with bilateral sciatica    Right knee pain    Anxiety    Bipolar II disorder ([facility])    Esophageal reflux    Hypertension    Hypokalemia    Insomnia    Urinary incontinence    Tardive dyskinesia    Dysphagia    Past Medical History  Past Medical History:   Diagnosis Date    Acid reflux     Anxiety     RESOLVED: [id number]    Arthritis     Bipolar 2 disorder ([facility])     FOLLOWS WITH PSYCHIATRIST  CONTINUE LAMOTRIGINE; RESOLVED: [id number]    Depression     Familial tremor     both hands    Fibromyalgia     LAST ASSESSED: [id number]    Hearing aid worn     left ear    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    CONT ASA 81 MG DAILY; RESOLVED: [id number]    Urinary incontinence     Wears dentures     partial lower / full upper    Wears glasses      Past Surgical History  Past Surgical History:   Procedure Laterality Date    BACK SURGERY       SECTION      COLONOSCOPY      RESOLVED: [id number]    EAR SURGERY      HYSTERECTOMY  2004    MYRINGOTOMY W/ TUBES Left     NECK SURGERY  2019    TN CYSTOURETHROSCOPY N/A 2016    Procedure: CYSTOSCOPY, BOTOX INJECTION;  Surgeon: Tico Arvizu MD;  Location: [facility];  Service: Gynecology    TN PERCUT IMPLNT Gen [place] Right 2020    Procedure: INSERTION THORACIC DORSAL COLUMN SPINAL CORD STIMULATOR PERCUTANEOUS W IMPLANTABLE PULSE GENERATOR, RIGHT;  Surgeon: Adelina Lugo MD; Location: [facility];  Service: Neurosurgery    TONSILLECTOMY      TUBAL LIGATION  1986 09/06/20 1220   Note Type   Note type Eval only   Restrictions/Precautions   Weight Bearing Precautions Per Order No   RLE Weight Bearing Per Order   (no restriction per Dr Darrian Payne)   Pain Assessment   Pain Assessment Tool 0-10   Pain Score 8   Pain Location/Orientation Orientation: Right;Location: Knee   Home Living   Type of Home Apartment   Home Layout Two level  (5ste)   Bathroom Shower/Tub Tub/shower unit   Bathroom Toilet Standard   Bathroom Equipment Shower chair   Home Equipment Walker   Prior Function   Lives With Family;Daughter  (grandchildre, CLARENCE)   ADL Assistance Independent   Falls in the last 6 months 1 to 4   Vocational   (2)   Lifestyle   Autonomy PTA pt states difficulty with her ADLs; states independence with her transfers, ambulation--with rollator, limited distances; +home alone, +falls=2, neg    Reciprocal Relationships 3 children   Service to Others worked in the [employer] [place]   Patient Behaviors/Mood [place] affect; Cooperative   Subjective   Subjective "Do you think I will be able to walk standing upright?"   ADL   Where Assessed Edge of bed   Eating Assistance 6  Modified independent   Grooming Assistance 6  Modified Independent   UB Bathing Assistance 5  Supervision/Setup   LB Bathing Assistance 3  Moderate Assistance   UB Dressing Assistance 5  Supervision/Setup   LB Dressing Assistance 3  Moderate Assistance   Bed Mobility   Supine to Sit 5  Supervision   Additional items Increased time required;Verbal cues   Transfers   Sit to Stand 3  Moderate assistance   Additional items Assist x 1; Increased time required;Verbal cues   Stand to Sit 4  Minimal assistance   Additional items Assist x 1; Increased time required;Verbal cues   Functional Mobility   Functional Mobility 4  Minimal assistance   Additional Comments x1   Additional items Rolling walker   Balance   Static Sitting Fair +   Dynamic Sitting Fair   Static Standing Fair -   Dynamic Standing Poor +   Activity Tolerance   Activity Tolerance Patient limited by fatigue;Patient limited by pain   Medical Staff Made Aware nsg    RUE Assessment   RUE Assessment WFL   RUE Strength   RUE Overall Strength Within Functional Limits - able to perform ADL tasks with strength  (4/5 throughout)   LUE Assessment   LUE Assessment WFL   LUE Strength   LUE Overall Strength Within Functional Limits - able to perform ADL tasks with strength  (4/5 throughout)   Hand Function   Gross Motor Coordination Functional   Fine Motor Coordination Functional   Sensation   Light Touch No apparent deficits   Proprioception   Proprioception No apparent deficits   Vision-Basic Assessment   Current Vision Wears glasses all the time   Vision - Complex Assessment   Acuity Able to read clock/calendar on wall without difficulty   Perception   Inattention/Neglect Appears intact   Cognition   Overall Cognitive Status [facility]   Arousal/Participation Alert   Attention Attends with cues to redirect   Orientation Level Oriented X4   Memory Within functional limits   Following Commands Follows one step commands with increased time or repetition   Assessment   Limitation Decreased ADL status; Decreased UE strength;Decreased Safe judgement during ADL;Decreased endurance;Decreased high-level ADLs   Prognosis Fair   Assessment Pt is a 53y/o female admitted to the hospital 2* symptoms of R knee pain, s/p fall, and an inability to ambulate; x-ray(R knee)=neg fx, severe OA  Currently pt is allowed activity as tolerated(per Dr Zuleika Fitch)  Pt with PMH bipolar, depression, fibromyalgia, OA(scheduled for R TKR in 10/20), memory loss, panic attacks, and LBP--s/p spinal stimulator(8/20)   PTA pt states difficulty with her ADLs; states independence with her transfers, ambulation--with rollator, limited distances; +home alone, +falls=2, neg   During initial eval, pt demonstrated deficits with her functional balance, functional mobility, ADL status, activity tolerance(currently fair=15-20mins), b/l UE strength, and transfer safety  Pt would benefit from continued OT tx for the above deficits  3-5xwk/1-2wks  Goals   Patient Goals "less pain"   STG Time Frame   (1-7 days)   Short Term Goal #1 Pt will demonstrate improved activity tolerance to good(20-30mins) and standing tolerance to 3-5mins to assist with ADLs  Short Term Goal #2 Pt will demonstrate mod I with their sit-stand transfers to assist with completion of their LE dressing  Short Term Goal  Pt will demonstrate proper walker/transfer safety 100% of the time  LTG Time Frame   (7-14days)   Long Term Goal #1 Pt will demonstrate g/g- balance with all functional activities  Long Term Goal #2 Pt will demonstrate mod I with their UE and LE bathing/dresssing  Long Term Goal Pt will tolerate continued cognitive/home-safety assessment and appropriate d/c recommendations will be provided  Plan   Treatment Interventions ADL retraining;Functional transfer training;UE strengthening/ROM; Endurance training;Cognitive reorientation;Patient/family training;Equipment evaluation/education; Compensatory technique education;Continued evaluation   Goal Expiration Date 09/20/20   OT Treatment Day 0   OT Frequency 3-5x/wk   Recommendation   OT Discharge Recommendation Post-Acute Rehabilitation Services   Barthel Index   Feeding 10   Bathing 0   Grooming Score 5   Dressing Score 5   Bladder Score 10   Bowels Score 10   Toilet Use Score 5   Transfers (Bed/Chair) Score 10   Mobility (Level Surface) Score 0   Stairs Score 0   Barthel Index Score 55   Danny Polo, OT

## 2020-09-06 NOTE — ASSESSMENT & PLAN NOTE
-continue home medications with hydroxyzine at bedtime and Ativan 0 5 mg p r n  T i d   -PA- website was queried, and there are no red flags  -continue Buspar 5mgt i d , Cymbalta 60 mg daily, Seroquel 100 mg q h s , and trazodone 150 mg at bedtime:  Doses were confirmed personally with patient at the bedside

## 2020-09-07 ENCOUNTER — ANESTHESIA EVENT (INPATIENT)
Dept: GASTROENTEROLOGY | Facility: HOSPITAL | Age: 55
DRG: 392 | End: 2020-09-07
Payer: COMMERCIAL

## 2020-09-07 PROCEDURE — 99232 SBSQ HOSP IP/OBS MODERATE 35: CPT | Performed by: INTERNAL MEDICINE

## 2020-09-07 PROCEDURE — 99232 SBSQ HOSP IP/OBS MODERATE 35: CPT | Performed by: HOSPITALIST

## 2020-09-07 RX ADMIN — MORPHINE SULFATE 15 MG: 15 TABLET ORAL at 06:03

## 2020-09-07 RX ADMIN — OXYCODONE HYDROCHLORIDE AND ACETAMINOPHEN 500 MG: 500 TABLET ORAL at 09:12

## 2020-09-07 RX ADMIN — PROPRANOLOL HYDROCHLORIDE 20 MG: 20 TABLET ORAL at 09:12

## 2020-09-07 RX ADMIN — DICLOFENAC SODIUM 4 G: 10 GEL TOPICAL at 17:48

## 2020-09-07 RX ADMIN — PRAZOSIN HYDROCHLORIDE 2 MG: 2 CAPSULE ORAL at 21:28

## 2020-09-07 RX ADMIN — BUSPIRONE HYDROCHLORIDE 5 MG: 5 TABLET ORAL at 09:12

## 2020-09-07 RX ADMIN — ACETAMINOPHEN 975 MG: 325 TABLET ORAL at 21:26

## 2020-09-07 RX ADMIN — BUSPIRONE HYDROCHLORIDE 5 MG: 5 TABLET ORAL at 15:59

## 2020-09-07 RX ADMIN — SENNOSIDES AND DOCUSATE SODIUM 1 TABLET: 8.6; 5 TABLET ORAL at 17:46

## 2020-09-07 RX ADMIN — POLYETHYLENE GLYCOL 3350 17 G: 17 POWDER, FOR SOLUTION ORAL at 09:30

## 2020-09-07 RX ADMIN — ENOXAPARIN SODIUM 40 MG: 40 INJECTION SUBCUTANEOUS at 09:12

## 2020-09-07 RX ADMIN — OXYCODONE HYDROCHLORIDE AND ACETAMINOPHEN 500 MG: 500 TABLET ORAL at 17:46

## 2020-09-07 RX ADMIN — PREGABALIN 150 MG: 75 CAPSULE ORAL at 21:28

## 2020-09-07 RX ADMIN — PREGABALIN 150 MG: 75 CAPSULE ORAL at 09:13

## 2020-09-07 RX ADMIN — LORATADINE 10 MG: 5 SOLUTION ORAL at 09:14

## 2020-09-07 RX ADMIN — MORPHINE SULFATE 15 MG: 15 TABLET ORAL at 17:46

## 2020-09-07 RX ADMIN — FERROUS SULFATE TAB 325 MG (65 MG ELEMENTAL FE) 325 MG: 325 (65 FE) TAB at 09:13

## 2020-09-07 RX ADMIN — QUETIAPINE FUMARATE 300 MG: 300 TABLET, EXTENDED RELEASE ORAL at 21:29

## 2020-09-07 RX ADMIN — OXYBUTYNIN CHLORIDE 5 MG: 5 TABLET ORAL at 17:48

## 2020-09-07 RX ADMIN — ACETAMINOPHEN 975 MG: 325 TABLET ORAL at 06:04

## 2020-09-07 RX ADMIN — DULOXETINE HYDROCHLORIDE 30 MG: 30 CAPSULE, DELAYED RELEASE ORAL at 09:12

## 2020-09-07 RX ADMIN — Medication 1000 UNITS: at 09:13

## 2020-09-07 RX ADMIN — AMLODIPINE BESYLATE 5 MG: 5 TABLET ORAL at 09:12

## 2020-09-07 RX ADMIN — ACETAMINOPHEN 975 MG: 325 TABLET ORAL at 13:37

## 2020-09-07 RX ADMIN — LIDOCAINE 1 PATCH: 50 PATCH CUTANEOUS at 09:10

## 2020-09-07 RX ADMIN — PANTOPRAZOLE SODIUM 40 MG: 40 TABLET, DELAYED RELEASE ORAL at 06:04

## 2020-09-07 RX ADMIN — DULOXETINE HYDROCHLORIDE 60 MG: 60 CAPSULE, DELAYED RELEASE ORAL at 09:13

## 2020-09-07 RX ADMIN — PREGABALIN 150 MG: 75 CAPSULE ORAL at 15:59

## 2020-09-07 RX ADMIN — OXYBUTYNIN CHLORIDE 5 MG: 5 TABLET ORAL at 09:12

## 2020-09-07 RX ADMIN — FLUTICASONE PROPIONATE 1 SPRAY: 50 SPRAY, METERED NASAL at 09:13

## 2020-09-07 RX ADMIN — PROPRANOLOL HYDROCHLORIDE 20 MG: 20 TABLET ORAL at 17:46

## 2020-09-07 RX ADMIN — TRAZODONE HYDROCHLORIDE 150 MG: 100 TABLET ORAL at 21:28

## 2020-09-07 RX ADMIN — DICLOFENAC SODIUM 4 G: 10 GEL TOPICAL at 12:15

## 2020-09-07 RX ADMIN — SENNOSIDES AND DOCUSATE SODIUM 1 TABLET: 8.6; 5 TABLET ORAL at 09:12

## 2020-09-07 RX ADMIN — BUSPIRONE HYDROCHLORIDE 5 MG: 5 TABLET ORAL at 21:28

## 2020-09-07 NOTE — PROGRESS NOTES
Progress Note - Samantha La 1965, 62 y o  female MRN: [id number]    Unit/Bed#: E5 -01 Encounter: [id number]    Primary Care Provider: Sofia Ayoub MD   Date and time admitted to hospital: 2020 10:06 AM        Right knee pain  Assessment & Plan  Seen by ortho  Thought to be due to arthritis and nothing to do acutely    Looking for rehab    Dysphagia  Assessment & Plan  EGD tomorrow    Bipolar II disorder [facility])  Assessment & Plan  Continue current home meds    * Ambulatory dysfunction  Assessment & Plan  Looking for rehab          Subjective:   Says her nose is stuffed up  No knee pain when just lying in bed  Objective:     Vitals:   Temp (24hrs), Av 7 °F (36 5 °C), Min:97 6 °F (36 4 °C), Max:97 8 °F (36 6 °C)    Temp:  [97 6 °F (36 4 °C)-97 8 °F (36 6 °C)] 97 6 °F (36 4 °C)  HR:  [66-76] 66  Resp:  [18-20] 20  BP: (113-118)/(59-73) 113/73  SpO2:  [95 %-98 %] 98 %  Body mass index is 40 61 kg/m²  Input and Output Summary (last 24 hours): Intake/Output Summary (Last 24 hours) at 2020 1147  Last data filed at 2020 1721  Gross per 24 hour   Intake 480 ml   Output    Net 480 ml       Physical Exam:     Physical Exam  Vitals signs and nursing note reviewed  HENT:      Head: Normocephalic and atraumatic  Eyes:      Pupils: Pupils are equal, round, and reactive to light  Cardiovascular:      Rate and Rhythm: Normal rate and regular rhythm  Heart sounds: No murmur  No friction rub  No gallop  Pulmonary:      Effort: Pulmonary effort is normal       Breath sounds: Normal breath sounds  No wheezing or rales  Abdominal:      General: Bowel sounds are normal       Palpations: Abdomen is soft  Tenderness: There is no abdominal tenderness                 Additional Data:     Labs:    Results from last 7 days   Lab Units 20  0644   WBC Thousand/uL 5 63   HEMOGLOBIN g/dL 12 6   HEMATOCRIT % 38 5   PLATELETS Thousands/uL 230   NEUTROS PCT % 65   LYMPHS PCT % 21   MONOS PCT % 9   EOS PCT % 4     Results from last 7 days   Lab Units 09/05/20  0644 09/04/20  0928   POTASSIUM mmol/L 3 1* 3 1*   CHLORIDE mmol/L 104 104   CO2 mmol/L 30 31   BUN mg/dL 15 14   CREATININE mg/dL 0 98 0 94   CALCIUM mg/dL 8 2* 8 3   ALK PHOS U/L  --  121*   ALT U/L  --  17   AST U/L  --  12     Results from last 7 days   Lab Units 09/04/20  0928   INR  1 09         Results from last 7 days   Lab Units 09/04/20  0922   HEMOGLOBIN A1C % 5 0           * I Have Reviewed All Lab Data     Recent Cultures (last 7 days):             Last 24 Hours Medication List:   Current Facility-Administered Medications   Medication Dose Route Frequency Provider Last Rate    acetaminophen  975 mg Oral Q8H [address] Carla Martinez, DO      amLODIPine  5 mg Oral Daily Carla Martinez, DO      ascorbic acid  500 mg Oral BID Gregor Arguelles, DO      busPIRone  5 mg Oral TID Gregor Arguelles, DO      cholecalciferol  1,000 Units Oral Daily Gregor Arguelles, DO      diclofenac sodium  4 g Topical 4x Daily Carla Martinez, DO      DULoxetine  30 mg Oral Daily Gregor Arguelles, DO      DULoxetine  60 mg Oral Daily Gregor Arguelles, DO      enoxaparin  40 mg Subcutaneous Q24H [address] Gregor Arguelles, DO      ferrous sulfate  325 mg Oral Daily With Breakfast Gregor Arguelles, DO      fluticasone  1 spray Each Nare Daily Srini Griffin MD      hydrOXYzine HCL  50 mg Oral HS PRN Gregor Arguelles, DO      lidocaine  1 patch Topical Daily Carla Martinez, DO      loratadine  10 mg Oral Daily Srini Griffin MD      LORazepam  0 5 mg Oral Q8H PRN Gregor Arguelles, DO      morphine  15 mg Oral Q12H Carla Martinez, DO      naloxone  0 04 mg Intravenous Q1MIN PRN Gregor Arguelles, DO      ondansetron  4 mg Intravenous Q6H PRN Gregor Arguelles, DO      oxybutynin  5 mg Oral BID Gregor Arguelles, DO      oxyCODONE  2 5 mg Oral Q4H PRN Gregor Arguelles, DO      pantoprazole  40 mg Oral Daily Before Breakfast Srini Griffin MD      polyethylene glycol  17 g Oral Daily PRN Gregor Arguelles DO      prazosin  2 mg Oral HS Carla Martinez, DO      pregabalin  150 mg Oral TID Loren Robertson, DO      propranolol  20 mg Oral BID Lorne Robertson, DO      QUEtiapine  300 mg Oral HS Loren Robertson, DO      senna-docusate sodium  1 tablet Oral BID Loren Robertson, DO      traZODone  150 mg Oral HS Carla Martinez, DO      Valbenazine Tosylate  80 mg Oral Daily Loren Robertson, DO           VTE Pharmacologic Prophylaxis:   Pharmacologic: Enoxaparin (Lovenox)      Current Length of Stay: 2 day(s)    Current Patient Status: Inpatient       Discharge Plan: waiting for rehab placement    Code Status: Level 1 - Full Code           Today, Patient Was Seen By: Aleah John DO    ** Please Note: Dictation voice to text software may have been used in the creation of this document   **

## 2020-09-07 NOTE — PLAN OF CARE
Problem: Potential for Falls  Goal: Patient will remain free of falls  Description: INTERVENTIONS:  - Assess patient frequently for physical needs  -  Identify cognitive and physical deficits and behaviors that affect risk of falls  -  [place] fall precautions as indicated by assessment   - Educate patient/family on patient safety including physical limitations  - Instruct patient to call for assistance with activity based on assessment  - Modify environment to reduce risk of injury  - Consider OT/PT consult to assist with strengthening/mobility  Outcome: Progressing     Problem: Nutrition/Hydration-ADULT  Goal: Nutrient/Hydration intake appropriate for improving, restoring or maintaining nutritional needs  Description: Monitor and assess patient's nutrition/hydration status for malnutrition  Collaborate with interdisciplinary team and initiate plan and interventions as ordered  Monitor patient's weight and dietary intake as ordered or per policy  Utilize nutrition screening tool and intervene as necessary  Determine patient's food preferences and provide high-protein, high-caloric foods as appropriate       INTERVENTIONS:  - Monitor oral intake, urinary output, labs, and treatment plans  - Assess nutrition and hydration status and recommend course of action  - Evaluate amount of meals eaten  - Assist patient with eating if necessary   - Allow adequate time for meals  - Recommend/ encourage appropriate diets, oral nutritional supplements, and vitamin/mineral supplements  - Order, calculate, and assess calorie counts as needed  - Recommend, monitor, and adjust tube feedings and TPN/PPN based on assessed needs  - Assess need for intravenous fluids  - Provide specific nutrition/hydration education as appropriate  - Include patient/family/caregiver in decisions related to nutrition  Outcome: Progressing     Problem: Prexisting or High Potential for Compromised Skin Integrity  Goal: Skin integrity is maintained or improved  Description: INTERVENTIONS:  - Identify patients at risk for skin breakdown  - Assess and monitor skin integrity  - Assess and monitor nutrition and hydration status  - Monitor labs   - Assess for incontinence   - Turn and reposition patient  - Assist with mobility/ambulation  - Relieve pressure over bony prominences  - Avoid friction and shearing  - Provide appropriate hygiene as needed including keeping skin clean and dry  - Evaluate need for skin moisturizer/barrier cream  - Collaborate with interdisciplinary team   - Patient/family teaching  - Consider wound care consult   Outcome: Progressing     Problem: PAIN - ADULT  Goal: Verbalizes/displays adequate comfort level or baseline comfort level  Description: Interventions:  - Encourage patient to monitor pain and request assistance  - Assess pain using appropriate pain scale  - Administer analgesics based on type and severity of pain and evaluate response  - Implement non-pharmacological measures as appropriate and evaluate response  - Consider cultural and social influences on pain and pain management  - Notify physician/advanced practitioner if interventions unsuccessful or patient reports new pain  Outcome: Progressing     Problem: INFECTION - ADULT  Goal: Absence or prevention of progression during hospitalization  Description: INTERVENTIONS:  - Assess and monitor for signs and symptoms of infection  - Monitor lab/diagnostic results  - Monitor all insertion sites, i e  indwelling lines, tubes, and drains  - Monitor endotracheal if appropriate and nasal secretions for changes in amount and color  - [place] appropriate cooling/warming therapies per order  - Administer medications as ordered  - Instruct and encourage patient and family to use good hand hygiene technique  - Identify and instruct in appropriate isolation precautions for identified infection/condition  Outcome: Progressing  Goal: Absence of fever/infection during neutropenic period  Description: INTERVENTIONS:  - Monitor WBC    Outcome: Progressing     Problem: SAFETY ADULT  Goal: Patient will remain free of falls  Description: INTERVENTIONS:  - Assess patient frequently for physical needs  -  Identify cognitive and physical deficits and behaviors that affect risk of falls    -  [place] fall precautions as indicated by assessment   - Educate patient/family on patient safety including physical limitations  - Instruct patient to call for assistance with activity based on assessment  - Modify environment to reduce risk of injury  - Consider OT/PT consult to assist with strengthening/mobility  Outcome: Progressing  Goal: Maintain or return to baseline ADL function  Description: INTERVENTIONS:  -  Assess patient's ability to carry out ADLs; assess patient's baseline for ADL function and identify physical deficits which impact ability to perform ADLs (bathing, care of mouth/teeth, toileting, grooming, dressing, etc )  - Assess/evaluate cause of self-care deficits   - Assess range of motion  - Assess patient's mobility; develop plan if impaired  - Assess patient's need for assistive devices and provide as appropriate  - Encourage maximum independence but intervene and supervise when necessary  - Involve family in performance of ADLs  - Assess for home care needs following discharge   - Consider OT consult to assist with ADL evaluation and planning for discharge  - Provide patient education as appropriate  Outcome: Progressing  Goal: Maintain or return mobility status to optimal level  Description: INTERVENTIONS:  - Assess patient's baseline mobility status (ambulation, transfers, stairs, etc )    - Identify cognitive and physical deficits and behaviors that affect mobility  - Identify mobility aids required to assist with transfers and/or ambulation (gait belt, sit-to-stand, lift, walker, cane, etc )  - [place] fall precautions as indicated by assessment  - Record patient progress and toleration of activity level on Mobility SBAR; progress patient to next Phase/Stage  - Instruct patient to call for assistance with activity based on assessment  - Consider rehabilitation consult to assist with strengthening/weightbearing, etc   Outcome: Progressing     Problem: DISCHARGE PLANNING  Goal: Discharge to home or other facility with appropriate resources  Description: INTERVENTIONS:  - Identify barriers to discharge w/patient and caregiver  - Arrange for needed discharge resources and transportation as appropriate  - Identify discharge learning needs (meds, wound care, etc )  - Arrange for interpretive services to assist at discharge as needed  - Refer to Case Management Department for coordinating discharge planning if the patient needs post-hospital services based on physician/advanced practitioner order or complex needs related to functional status, cognitive ability, or social support system  Outcome: Progressing     Problem: Knowledge Deficit  Goal: Patient/family/caregiver demonstrates understanding of disease process, treatment plan, medications, and discharge instructions  Description: Complete learning assessment and assess knowledge base    Interventions:  - Provide teaching at level of understanding  - Provide teaching via preferred learning methods  Outcome: Progressing     Problem: NEUROSENSORY - ADULT  Goal: Achieves stable or improved neurological status  Description: INTERVENTIONS  - Monitor and report changes in neurological status  - Monitor vital signs such as temperature, blood pressure, glucose, and any other labs ordered   - Initiate measures to prevent increased intracranial pressure  - Monitor for seizure activity and implement precautions if appropriate      Outcome: Progressing  Goal: Remains free of injury related to seizures activity  Description: INTERVENTIONS  - Maintain airway, patient safety  and administer oxygen as ordered  - Monitor patient for seizure activity, document and report duration and description of seizure to physician/advanced practitioner  - If seizure occurs,  ensure patient safety during seizure  - Reorient patient post seizure  - Seizure pads on all 4 side rails  - Instruct patient/family to notify RN of any seizure activity including if an aura is experienced  - Instruct patient/family to call for assistance with activity based on nursing assessment  - Administer anti-seizure medications if ordered    Outcome: Progressing  Goal: Achieves maximal functionality and self care  Description: INTERVENTIONS  - Monitor swallowing and airway patency with patient fatigue and changes in neurological status  - Encourage and assist patient to increase activity and self care     - Encourage visually impaired, hearing impaired and aphasic patients to use assistive/communication devices  Outcome: Progressing     Problem: CARDIOVASCULAR - ADULT  Goal: Maintains optimal cardiac output and hemodynamic stability  Description: INTERVENTIONS:  - Monitor I/O, vital signs and rhythm  - Monitor for S/S and trends of decreased cardiac output  - Administer and titrate ordered vasoactive medications to optimize hemodynamic stability  - Assess quality of pulses, skin color and temperature  - Assess for signs of decreased coronary artery perfusion  - Instruct patient to report change in severity of symptoms  Outcome: Progressing  Goal: Absence of cardiac dysrhythmias or at baseline rhythm  Description: INTERVENTIONS:  - Continuous cardiac monitoring, vital signs, obtain 12 lead EKG if ordered  - Administer antiarrhythmic and heart rate control medications as ordered  - Monitor electrolytes and administer replacement therapy as ordered  Outcome: Progressing     Problem: RESPIRATORY - ADULT  Goal: Achieves optimal ventilation and oxygenation  Description: INTERVENTIONS:  - Assess for changes in respiratory status  - Assess for changes in mentation and behavior  - Position to facilitate oxygenation and minimize respiratory effort  - Oxygen administered by appropriate delivery if ordered  - Initiate smoking cessation education as indicated  - Encourage broncho-pulmonary hygiene including cough, deep breathe, Incentive Spirometry  - Assess the need for suctioning and aspirate as needed  - Assess and instruct to report SOB or any respiratory difficulty  - Respiratory Therapy support as indicated  Outcome: Progressing     Problem: GASTROINTESTINAL - ADULT  Goal: Minimal or absence of nausea and/or vomiting  Description: INTERVENTIONS:  - Administer IV fluids if ordered to ensure adequate hydration  - Maintain NPO status until nausea and vomiting are resolved  - Nasogastric tube if ordered  - Administer ordered antiemetic medications as needed  - Provide nonpharmacologic comfort measures as appropriate  - Advance diet as tolerated, if ordered  - Consider nutrition services referral to assist patient with adequate nutrition and appropriate food choices  Outcome: Progressing  Goal: Maintains or returns to baseline bowel function  Description: INTERVENTIONS:  - Assess bowel function  - Encourage oral fluids to ensure adequate hydration  - Administer IV fluids if ordered to ensure adequate hydration  - Administer ordered medications as needed  - Encourage mobilization and activity  - Consider nutritional services referral to assist patient with adequate nutrition and appropriate food choices  Outcome: Progressing  Goal: Maintains adequate nutritional intake  Description: INTERVENTIONS:  - Monitor percentage of each meal consumed  - Identify factors contributing to decreased intake, treat as appropriate  - Assist with meals as needed  - Monitor I&O, weight, and lab values if indicated  - Obtain nutrition services referral as needed  Outcome: Progressing     Problem: GENITOURINARY - ADULT  Goal: Maintains or returns to baseline urinary function  Description: INTERVENTIONS:  - Assess urinary function  - Encourage oral fluids to ensure adequate hydration if ordered  - Administer IV fluids as ordered to ensure adequate hydration  - Administer ordered medications as needed  - Offer frequent toileting  - Follow urinary retention protocol if ordered  Outcome: Progressing  Goal: Absence of urinary retention  Description: INTERVENTIONS:  - Assess patients ability to void and empty bladder  - Monitor I/O  - Bladder scan as needed  - Discuss with physician/AP medications to alleviate retention as needed  - Discuss catheterization for long term situations as appropriate  Outcome: Progressing     Problem: METABOLIC, FLUID AND ELECTROLYTES - ADULT  Goal: Electrolytes maintained within normal limits  Description: INTERVENTIONS:  - Monitor labs and assess patient for signs and symptoms of electrolyte imbalances  - Administer electrolyte replacement as ordered  - Monitor response to electrolyte replacements, including repeat lab results as appropriate  - Instruct patient on fluid and nutrition as appropriate  Outcome: Progressing  Goal: Fluid balance maintained  Description: INTERVENTIONS:  - Monitor labs   - Monitor I/O and WT  - Instruct patient on fluid and nutrition as appropriate  - Assess for signs & symptoms of volume excess or deficit  Outcome: Progressing  Goal: Glucose maintained within target range  Description: INTERVENTIONS:  - Monitor Blood Glucose as ordered  - Assess for signs and symptoms of hyperglycemia and hypoglycemia  - Administer ordered medications to maintain glucose within target range  - Assess nutritional intake and initiate nutrition service referral as needed  Outcome: Progressing     Problem: SKIN/TISSUE INTEGRITY - ADULT  Goal: Skin integrity remains intact  Description: INTERVENTIONS  - Identify patients at risk for skin breakdown  - Assess and monitor skin integrity  - Assess and monitor nutrition and hydration status  - Monitor labs (i e  albumin)  - Assess for incontinence   - Turn and reposition patient  - Assist with mobility/ambulation  - Relieve pressure over bony prominences  - Avoid friction and shearing  - Provide appropriate hygiene as needed including keeping skin clean and dry  - Evaluate need for skin moisturizer/barrier cream  - Collaborate with interdisciplinary team (i e  Nutrition, Rehabilitation, etc )   - Patient/family teaching  Outcome: Progressing  Goal: Oral mucous membranes remain intact  Description: INTERVENTIONS  - Assess oral mucosa and hygiene practices  - Implement preventative oral hygiene regimen  - Implement oral medicated treatments as ordered  - Initiate Nutrition services referral as needed  Outcome: Progressing     Problem: HEMATOLOGIC - ADULT  Goal: Maintains hematologic stability  Description: INTERVENTIONS  - Assess for signs and symptoms of bleeding or hemorrhage  - Monitor labs  - Administer supportive blood products/factors as ordered and appropriate  Outcome: Progressing     Problem: MUSCULOSKELETAL - ADULT  Goal: Maintain or return mobility to safest level of function  Description: INTERVENTIONS:  - Assess patient's ability to carry out ADLs; assess patient's baseline for ADL function and identify physical deficits which impact ability to perform ADLs (bathing, care of mouth/teeth, toileting, grooming, dressing, etc )  - Assess/evaluate cause of self-care deficits   - Assess range of motion  - Assess patient's mobility  - Assess patient's need for assistive devices and provide as appropriate  - Encourage maximum independence but intervene and supervise when necessary  - Involve family in performance of ADLs  - Assess for home care needs following discharge   - Consider OT consult to assist with ADL evaluation and planning for discharge  - Provide patient education as appropriate  Outcome: Progressing  Goal: Maintain proper alignment of affected body part  Description: INTERVENTIONS:  - Support, maintain and protect limb and body alignment  - Provide patient/ family with appropriate education  Outcome: Progressing

## 2020-09-07 NOTE — NURSING NOTE
Agree with previous RNs assessment  Patient resting comfortably in bed  Provided patient with fresh cup of ice water  Call bell and belongings within reach

## 2020-09-07 NOTE — PROGRESS NOTES
Progress Note - Melody D Cherise Saint 62 y o  female MRN: [id number]    Unit/Bed#: E5 -01 Encounter: [id number]      ASSESSMENT/ PLAN:   61 yo female with pmh GERD, osteoarthritis who presented to the ED with ambulatory dysfunction being evaluated for dysphagia    1  Dysphagia: with both liquids and solids  She was planned for barium swallow, which has not yet been completed  This could be secondary to esophagitis, stricture or motility disorder  She is agreeable for EGD Tuesday 9/8  -plan for EGD tomorrow 9/8  -NPO after midnight  -continue PPI BID  -will cancel barium swallow Tuesday so EGD is not postponed due to drinking barium, this can be completed after procedure      2  Colon cancer screening: family hx in father  Overdue for screening  -recommend outpatient colonoscopy for screening purposes     Subjective:     Patient seen and examined  Continues to complain of dysphagia to liquids and solids  Barium swallow planned     Objective:     Vitals: Blood pressure 113/73, pulse 66, temperature 97 6 °F (36 4 °C), temperature source Temporal, resp  rate 20, height 5' 1" (1 549 m), weight 97 5 kg (214 lb 15 2 oz), SpO2 98 %, not currently breastfeeding  ,Body mass index is 40 61 kg/m²        Intake/Output Summary (Last 24 hours) at 9/7/2020 0934  Last data filed at 9/6/2020 1721  Gross per 24 hour   Intake 480 ml   Output    Net 480 ml       Physical Exam:     General Appearance: A&Ox3, appears stated age and cooperative  Lungs: Clear to auscultation bilaterally, no rales or rhonchi  Heart: Regular rate and rhythm, S1, S2 normal, no murmur, click, rub or gallop  Abdomen: Soft, non-tender, non-distended; bowel sounds normal; no masses or no organomegaly  Extremities: No cyanosis, clubbing, edema    Invasive Devices     Peripheral Intravenous Line            Peripheral IV 09/04/20 Left Antecubital 2 days                Lab Results:    Results from last 7 days   Lab Units 09/05/20  0644   WBC Thousand/uL 5 63 HEMOGLOBIN g/dL 12 6   HEMATOCRIT % 38 5   PLATELETS Thousands/uL 230   NEUTROS PCT % 65   LYMPHS PCT % 21   MONOS PCT % 9   EOS PCT % 4     Results from last 7 days   Lab Units 09/05/20  0644 09/04/20  0928   POTASSIUM mmol/L 3 1* 3 1*   CHLORIDE mmol/L 104 104   CO2 mmol/L 30 31   BUN mg/dL 15 14   CREATININE mg/dL 0 98 0 94   CALCIUM mg/dL 8 2* 8 3   ALK PHOS U/L  --  121*   ALT U/L  --  17   AST U/L  --  12     Results from last 7 days   Lab Units 09/04/20  0928   INR  1 09           Imaging Studies: I have personally reviewed pertinent imaging studies  Xr Chest 2 Views    Result Date: 9/4/2020  Impression: No acute consolidation or congestion     Xr Knee 4+ Vw Right Injury    Result Date: 9/5/2020  Impression: Moderate right knee osteoarthritis with genu varus  No evidence of fracture  Cta Ed Chest Pe Study    Result Date: 9/4/2020  Impression: No acute intrathoracic pathology identified, consistent with x-rays Specifically, no evidence of pulmonary embolism Suspected reflux esophagitis Multilevel thoracolumbar spinal fusion surgery

## 2020-09-08 ENCOUNTER — ANESTHESIA (INPATIENT)
Dept: GASTROENTEROLOGY | Facility: HOSPITAL | Age: 55
DRG: 392 | End: 2020-09-08
Payer: COMMERCIAL

## 2020-09-08 ENCOUNTER — PATIENT OUTREACH (OUTPATIENT)
Dept: INTERNAL MEDICINE CLINIC | Facility: CLINIC | Age: 55
End: 2020-09-08

## 2020-09-08 ENCOUNTER — APPOINTMENT (INPATIENT)
Dept: GASTROENTEROLOGY | Facility: HOSPITAL | Age: 55
DRG: 392 | End: 2020-09-08
Payer: COMMERCIAL

## 2020-09-08 ENCOUNTER — TELEPHONE (OUTPATIENT)
Dept: OBGYN CLINIC | Facility: MEDICAL CENTER | Age: 55
End: 2020-09-08

## 2020-09-08 VITALS — HEART RATE: 68 BPM

## 2020-09-08 LAB — SARS-COV-2 RNA RESP QL NAA+PROBE: NEGATIVE

## 2020-09-08 PROCEDURE — 97530 THERAPEUTIC ACTIVITIES: CPT

## 2020-09-08 PROCEDURE — 97535 SELF CARE MNGMENT TRAINING: CPT

## 2020-09-08 PROCEDURE — 97116 GAIT TRAINING THERAPY: CPT

## 2020-09-08 PROCEDURE — 97110 THERAPEUTIC EXERCISES: CPT

## 2020-09-08 PROCEDURE — 0D968ZX DRAINAGE OF STOMACH, VIA NATURAL OR ARTIFICIAL OPENING ENDOSCOPIC, DIAGNOSTIC: ICD-10-PCS | Performed by: INTERNAL MEDICINE

## 2020-09-08 PROCEDURE — 43239 EGD BIOPSY SINGLE/MULTIPLE: CPT | Performed by: INTERNAL MEDICINE

## 2020-09-08 PROCEDURE — 88305 TISSUE EXAM BY PATHOLOGIST: CPT | Performed by: PATHOLOGY

## 2020-09-08 PROCEDURE — 0D978ZX DRAINAGE OF STOMACH, PYLORUS, VIA NATURAL OR ARTIFICIAL OPENING ENDOSCOPIC, DIAGNOSTIC: ICD-10-PCS | Performed by: INTERNAL MEDICINE

## 2020-09-08 PROCEDURE — 87635 SARS-COV-2 COVID-19 AMP PRB: CPT | Performed by: HOSPITALIST

## 2020-09-08 RX ORDER — SODIUM CHLORIDE 9 MG/ML
INJECTION, SOLUTION INTRAVENOUS CONTINUOUS PRN
Status: DISCONTINUED | OUTPATIENT
Start: 2020-09-08 | End: 2020-09-08

## 2020-09-08 RX ORDER — LIDOCAINE HYDROCHLORIDE 20 MG/ML
INJECTION, SOLUTION INFILTRATION; PERINEURAL AS NEEDED
Status: DISCONTINUED | OUTPATIENT
Start: 2020-09-08 | End: 2020-09-08

## 2020-09-08 RX ORDER — PROPOFOL 10 MG/ML
INJECTION, EMULSION INTRAVENOUS AS NEEDED
Status: DISCONTINUED | OUTPATIENT
Start: 2020-09-08 | End: 2020-09-08

## 2020-09-08 RX ORDER — SODIUM CHLORIDE 9 MG/ML
125 INJECTION, SOLUTION INTRAVENOUS CONTINUOUS
Status: DISCONTINUED | OUTPATIENT
Start: 2020-09-08 | End: 2020-09-09 | Stop reason: HOSPADM

## 2020-09-08 RX ADMIN — AMLODIPINE BESYLATE 5 MG: 5 TABLET ORAL at 09:01

## 2020-09-08 RX ADMIN — TRAZODONE HYDROCHLORIDE 150 MG: 100 TABLET ORAL at 22:52

## 2020-09-08 RX ADMIN — PROPRANOLOL HYDROCHLORIDE 20 MG: 20 TABLET ORAL at 09:00

## 2020-09-08 RX ADMIN — PROPOFOL 140 MG: 10 INJECTION, EMULSION INTRAVENOUS at 13:11

## 2020-09-08 RX ADMIN — OXYBUTYNIN CHLORIDE 5 MG: 5 TABLET ORAL at 09:01

## 2020-09-08 RX ADMIN — SODIUM CHLORIDE 125 ML/HR: 0.9 INJECTION, SOLUTION INTRAVENOUS at 20:41

## 2020-09-08 RX ADMIN — OXYCODONE HYDROCHLORIDE AND ACETAMINOPHEN 500 MG: 500 TABLET ORAL at 09:00

## 2020-09-08 RX ADMIN — MORPHINE SULFATE 15 MG: 15 TABLET ORAL at 05:30

## 2020-09-08 RX ADMIN — PRAZOSIN HYDROCHLORIDE 2 MG: 2 CAPSULE ORAL at 22:53

## 2020-09-08 RX ADMIN — DULOXETINE HYDROCHLORIDE 60 MG: 60 CAPSULE, DELAYED RELEASE ORAL at 09:00

## 2020-09-08 RX ADMIN — DICLOFENAC SODIUM 4 G: 10 GEL TOPICAL at 08:53

## 2020-09-08 RX ADMIN — FERROUS SULFATE TAB 325 MG (65 MG ELEMENTAL FE) 325 MG: 325 (65 FE) TAB at 09:01

## 2020-09-08 RX ADMIN — OXYCODONE HYDROCHLORIDE 2.5 MG: 5 TABLET ORAL at 20:39

## 2020-09-08 RX ADMIN — QUETIAPINE FUMARATE 300 MG: 300 TABLET, EXTENDED RELEASE ORAL at 22:51

## 2020-09-08 RX ADMIN — LIDOCAINE 1 PATCH: 50 PATCH CUTANEOUS at 08:55

## 2020-09-08 RX ADMIN — LIDOCAINE HYDROCHLORIDE 5 ML: 20 INJECTION, SOLUTION INFILTRATION; PERINEURAL at 13:11

## 2020-09-08 RX ADMIN — ACETAMINOPHEN 975 MG: 325 TABLET ORAL at 05:29

## 2020-09-08 RX ADMIN — PROPRANOLOL HYDROCHLORIDE 20 MG: 20 TABLET ORAL at 18:21

## 2020-09-08 RX ADMIN — FLUTICASONE PROPIONATE 1 SPRAY: 50 SPRAY, METERED NASAL at 08:53

## 2020-09-08 RX ADMIN — BUSPIRONE HYDROCHLORIDE 5 MG: 5 TABLET ORAL at 09:00

## 2020-09-08 RX ADMIN — DULOXETINE HYDROCHLORIDE 30 MG: 30 CAPSULE, DELAYED RELEASE ORAL at 09:01

## 2020-09-08 RX ADMIN — SODIUM CHLORIDE 125 ML/HR: 0.9 INJECTION, SOLUTION INTRAVENOUS at 12:58

## 2020-09-08 RX ADMIN — Medication 1000 UNITS: at 09:00

## 2020-09-08 RX ADMIN — ACETAMINOPHEN 975 MG: 325 TABLET ORAL at 14:38

## 2020-09-08 RX ADMIN — DICLOFENAC SODIUM 4 G: 10 GEL TOPICAL at 18:21

## 2020-09-08 RX ADMIN — DICLOFENAC SODIUM 4 G: 10 GEL TOPICAL at 22:48

## 2020-09-08 RX ADMIN — SODIUM CHLORIDE: 0.9 INJECTION, SOLUTION INTRAVENOUS at 13:00

## 2020-09-08 RX ADMIN — PREGABALIN 150 MG: 75 CAPSULE ORAL at 09:01

## 2020-09-08 RX ADMIN — OXYCODONE HYDROCHLORIDE AND ACETAMINOPHEN 500 MG: 500 TABLET ORAL at 18:20

## 2020-09-08 RX ADMIN — BUSPIRONE HYDROCHLORIDE 5 MG: 5 TABLET ORAL at 15:04

## 2020-09-08 RX ADMIN — MORPHINE SULFATE 15 MG: 15 TABLET ORAL at 16:48

## 2020-09-08 RX ADMIN — LORATADINE 10 MG: 5 SOLUTION ORAL at 08:54

## 2020-09-08 RX ADMIN — SENNOSIDES AND DOCUSATE SODIUM 1 TABLET: 8.6; 5 TABLET ORAL at 18:20

## 2020-09-08 RX ADMIN — BUSPIRONE HYDROCHLORIDE 5 MG: 5 TABLET ORAL at 20:29

## 2020-09-08 RX ADMIN — PANTOPRAZOLE SODIUM 40 MG: 40 TABLET, DELAYED RELEASE ORAL at 05:29

## 2020-09-08 RX ADMIN — SENNOSIDES AND DOCUSATE SODIUM 1 TABLET: 8.6; 5 TABLET ORAL at 09:00

## 2020-09-08 RX ADMIN — ACETAMINOPHEN 975 MG: 325 TABLET ORAL at 22:52

## 2020-09-08 RX ADMIN — OXYBUTYNIN CHLORIDE 5 MG: 5 TABLET ORAL at 18:20

## 2020-09-08 RX ADMIN — PREGABALIN 150 MG: 75 CAPSULE ORAL at 15:03

## 2020-09-08 RX ADMIN — PROPOFOL 60 MG: 10 INJECTION, EMULSION INTRAVENOUS at 13:17

## 2020-09-08 RX ADMIN — PREGABALIN 150 MG: 75 CAPSULE ORAL at 20:29

## 2020-09-08 NOTE — PLAN OF CARE
Problem: OCCUPATIONAL THERAPY ADULT  Goal: Performs self-care activities at highest level of function for planned discharge setting  See evaluation for individualized goals  Description: Treatment Interventions: ADL retraining, Functional transfer training, UE strengthening/ROM, Endurance training, Cognitive reorientation, Patient/family training, Equipment evaluation/education, Compensatory technique education, Continued evaluation          See flowsheet documentation for full assessment, interventions and recommendations  Outcome: Progressing  Note: Limitation: Decreased ADL status, Decreased UE strength, Decreased Safe judgement during ADL, Decreased endurance, Decreased high-level ADLs  Prognosis: Fair  Assessment: Pt was seen for skilled OT with focus on completion bed mobility, LE dressing activity, functional transfers and review of current plan of care  See above levels of A required with all functional tasks  Pt may benefit from further rehab with focus on achieving optimal performance levels with all functional tasks   Continue to recommend Inpatient rehab     OT Discharge Recommendation: [address]

## 2020-09-08 NOTE — PROGRESS NOTES
Progress Note - Samantha Bailey 1965, 62 y o  female MRN: [id number]    Unit/Bed#: E5 -01 Encounter: [id number]    Primary Care Provider: Mary Wood MD   Date and time admitted to hospital: 2020 10:06 AM        Right knee pain  Assessment & Plan  Seen by ortho  Thought to be due to arthritis and nothing to do acutely    Looking for rehab    Dysphagia  Assessment & Plan  EGD done today, wait on results    Esophageal reflux  Assessment & Plan  -continue proton pump inhibitor    Had EGD, waiting on results          Subjective:   Feels better today  Still having trouble walking  No cough today  Objective:     Vitals:   Temp (24hrs), Av 4 °F (36 3 °C), Min:96 5 °F (35 8 °C), Max:97 9 °F (36 6 °C)    Temp:  [96 5 °F (35 8 °C)-97 9 °F (36 6 °C)] 97 9 °F (36 6 °C)  HR:  [63-74] 72  Resp:  [16-20] 20  BP: (114-143)/(82-99) 132/84  SpO2:  [97 %-100 %] 97 %  Body mass index is 40 61 kg/m²  Input and Output Summary (last 24 hours): Intake/Output Summary (Last 24 hours) at 2020 1630  Last data filed at 2020 1347  Gross per 24 hour   Intake 500 ml   Output 200 ml   Net 300 ml       Physical Exam:     Physical Exam  Vitals signs and nursing note reviewed  HENT:      Head: Normocephalic and atraumatic  Eyes:      Pupils: Pupils are equal, round, and reactive to light  Cardiovascular:      Rate and Rhythm: Normal rate and regular rhythm  Heart sounds: No murmur  No friction rub  No gallop  Pulmonary:      Effort: Pulmonary effort is normal       Breath sounds: Normal breath sounds  No wheezing or rales  Abdominal:      General: Bowel sounds are normal       Palpations: Abdomen is soft  Tenderness: There is no abdominal tenderness                 Additional Data:     Labs:    Results from last 7 days   Lab Units 20  0644   WBC Thousand/uL 5 63   HEMOGLOBIN g/dL 12 6   HEMATOCRIT % 38 5   PLATELETS Thousands/uL 230   NEUTROS PCT % 65   LYMPHS PCT % 21 MONOS PCT % 9   EOS PCT % 4     Results from last 7 days   Lab Units 09/05/20  0644 09/04/20  0928   POTASSIUM mmol/L 3 1* 3 1*   CHLORIDE mmol/L 104 104   CO2 mmol/L 30 31   BUN mg/dL 15 14   CREATININE mg/dL 0 98 0 94   CALCIUM mg/dL 8 2* 8 3   ALK PHOS U/L  --  121*   ALT U/L  --  17   AST U/L  --  12     Results from last 7 days   Lab Units 09/04/20  0928   INR  1 09         Results from last 7 days   Lab Units 09/04/20  0922   HEMOGLOBIN A1C % 5 0           * I Have Reviewed All Lab Data     Recent Cultures (last 7 days):             Last 24 Hours Medication List:   Current Facility-Administered Medications   Medication Dose Route Frequency Provider Last Rate    acetaminophen  975 mg Oral Q8H [address] Carlageorge Martinez, DO      amLODIPine  5 mg Oral Daily Carlageorge Martinez, DO      ascorbic acid  500 mg Oral BID Azalee Palms, DO      busPIRone  5 mg Oral TID Azalee Palms, DO      cholecalciferol  1,000 Units Oral Daily Azalee Palms, DO      diclofenac sodium  4 g Topical 4x Daily Carla Juan, DO      DULoxetine  30 mg Oral Daily Azalee Palms, DO      DULoxetine  60 mg Oral Daily Azalee Palms, DO      enoxaparin  40 mg Subcutaneous Q24H [address] Carlageorge Martinez, DO      ferrous sulfate  325 mg Oral Daily With Breakfast Azalee Palms, DO      fluticasone  1 spray Each Nare Daily Serg Morel MD      hydrOXYzine HCL  50 mg Oral HS PRN Azalee Palms, DO      lidocaine  1 patch Topical Daily Carlageorge Martinez, DO      loratadine  10 mg Oral Daily Serg Morel MD      LORazepam  0 5 mg Oral Q8H PRN Azalee Palms, DO      morphine  15 mg Oral Q12H Carla Martinez, DO      naloxone  0 04 mg Intravenous Q1MIN PRN Azalee Palms, DO      ondansetron  4 mg Intravenous Q6H PRN Azalee Palms, DO      oxybutynin  5 mg Oral BID Azalee Palms, DO      oxyCODONE  2 5 mg Oral Q4H PRN Azalee Palms, DO      pantoprazole  40 mg Oral Daily Before Breakfast Serg Morel MD      polyethylene glycol  17 g Oral Daily PRN Azalee Palms, DO      prazosin  2 mg Oral HS Maria Luisa Found, DO      pregabalin  150 mg Oral TID Maria Luisa Found, DO      propranolol  20 mg Oral BID Maria Luisa Found, DO      QUEtiapine  300 mg Oral HS Maria Luisa Found, DO      senna-docusate sodium  1 tablet Oral BID Maria Luisa Found, DO      sodium chloride  125 mL/hr Intravenous Continuous Eduardo Rashidian, DO Stopped (09/08/20 1347)    traZODone  150 mg Oral HS Maria Luisa Found, DO           VTE Pharmacologic Prophylaxis:   Pharmacologic: Enoxaparin (Lovenox)      Current Length of Stay: 3 day(s)    Current Patient Status: Inpatient       Discharge Plan: waiting on placement    Code Status: Level 1 - Full Code           Today, Patient Was Seen By: Gary Kee DO    ** Please Note: Dictation voice to text software may have been used in the creation of this document   **

## 2020-09-08 NOTE — OCCUPATIONAL THERAPY NOTE
Occupational Therapy Treatment Note:         09/08/20 1230   Restrictions/Precautions   Weight Bearing Precautions Per Order No   Other Precautions Fall Risk;Cognitive; Chair Alarm; Bed Alarm   Pain Assessment   Pain Assessment Tool 0-10   Pain Score 8   Pain Location/Orientation Orientation: Left; Location: Leg   ADL   Where Assessed Edge of bed   LB Dressing Assistance 3  Moderate Assistance   LB Dressing Deficit Setup;Steadying; Requires assistive device for steadying;Supervision/safety;Verbal cueing; Increased time to complete; Don/doff R sock; Don/doff L sock;Pull up over hips; Thread RLE into underwear; Thread LLE into underwear   LB Dressing Comments Pt with need for increased A for clothing management instance  Functional Standing Tolerance   Time 2 mins   Activity dynamic stand balance activities  Comments Pt with increased fatigue and poor safety awareness with activitiy    Bed Mobility   Supine to Sit 3  Moderate assistance   Additional items Assist x 1;Bedrails; Increased time required;Verbal cues;LE management;HOB elevated   Transfers   Sit to Stand 3  Moderate assistance   Additional items Assist x 2;Bedrails; Increased time required;Armrests; Verbal cues   Stand to Sit 3  Moderate assistance   Additional items Assist x 1; Armrests; Bedrails; Increased time required;Verbal cues   Stand pivot 3  Moderate assistance   Additional items Assist x 1; Armrests; Increased time required;Verbal cues   Additional Comments Hands on A required due to loss of balance noted  Functional Mobility   Functional Mobility 3  Moderate assistance   Additional Comments x1   Additional items Rolling walker   Cognition   Overall Cognitive Status Impaired   Arousal/Participation Responsive; Cooperative   Attention Attends with cues to redirect   Orientation Level Oriented to person;Oriented to place; Disoriented to time;Disoriented to situation   Memory Decreased short term memory;Decreased recall of recent events;Decreased recall of precautions   Following Commands Follows one step commands without difficulty   Comments cues for safety required due to impulsive movements  Additional Activities   Additional Activities Other (Comment)  (reviewed current plan of care  )   Additional Activities Comments Pt reports having F understanding  Activity Tolerance   Activity Tolerance Patient limited by fatigue;Patient limited by pain   Medical Staff Made Aware reported all findings to nursing staff  Assessment   Assessment Pt was seen for skilled OT with focus on completion bed mobility, LE dressing activity, functional transfers and review of current plan of care  See above levels of A required with all functional tasks  Pt may benefit from further rehab with focus on achieving optimal performance levels with all functional tasks  Continue to recommend Inpatient rehab   Plan   Treatment Interventions ADL retraining;Functional transfer training; Endurance training   Goal Expiration Date 09/20/20   OT Treatment Day 1   OT Frequency 3-5x/wk   Recommendation   OT Discharge Recommendation Post-Acute Rehabilitation Services   Barthel Index   Feeding 10   Bathing 0   Grooming Score 5   Dressing Score 5   Bladder Score 10   Bowels Score 10   Toilet Use Score 5   Transfers (Bed/Chair) Score 10   Mobility (Level Surface) Score 0   Stairs Score 0   Barthel Index Score 55   Modified Fleming Scale   Modified Willard Scale 4   Willem, [address]

## 2020-09-08 NOTE — ANESTHESIA PREPROCEDURE EVALUATION
Procedure:  EGD    Relevant Problems   CARDIO   (+) Hypertension      GI/HEPATIC   (+) Dysphagia   (+) Esophageal reflux      MUSCULOSKELETAL   (+) Chronic bilateral low back pain with bilateral sciatica   (+) Intractable low back pain   (+) Lumbar spondylosis   (+) Osteoarthritis of both hips   (+) Osteoarthritis of knee   (+) Primary localized osteoarthritis of both knees   (+) Sacroiliitis (HCC)      NEURO/PSYCH   (+) Anxiety   (+) Chronic pain syndrome   (+) Fibromyalgia   (+) Generalized anxiety disorder   (+) Intractable low back pain   (+) Major depressive disorder, recurrent episode, moderate degree (HCC)   (+) Panic disorder without agoraphobia   (+) Post traumatic stress disorder      PULMONARY   (+) MIMI (obstructive sleep apnea)        Physical Exam    Airway    Mallampati score: III  TM Distance: >3 FB  Neck ROM: full     Dental       Cardiovascular  Rhythm: regular, Rate: normal,     Pulmonary  Breath sounds clear to auscultation,     Other Findings        Anesthesia Plan  ASA Score- 3     Anesthesia Type- IV sedation with anesthesia with ASA Monitors  Additional Monitors:   Airway Plan:           Plan Factors-Exercise tolerance (METS): <4 METS  Chart reviewed  Patient summary reviewed  Patient is not a current smoker  Patient not instructed to abstain from smoking on day of procedure  Patient did not smoke on day of surgery  Induction- intravenous  Postoperative Plan-     Informed Consent- Anesthetic plan and risks discussed with patient

## 2020-09-08 NOTE — SOCIAL WORK
Case discuss at morning report report, informed by attending patient is medical clear  STR informed via ecin  Waiting for placement  SW/CM to follow

## 2020-09-08 NOTE — PLAN OF CARE
Problem: PHYSICAL THERAPY ADULT  Goal: Performs mobility at highest level of function for planned discharge setting  See evaluation for individualized goals  Description: Treatment/Interventions: Functional transfer training, LE strengthening/ROM, Elevations, Therapeutic exercise, Endurance training, Cognitive reorientation, Patient/family training, Equipment eval/education, Bed mobility, Gait training, Compensatory technique education, Continued evaluation, Spoke to nursing, Spoke to case management, Spoke to advanced practitioner  Equipment Recommended: Jann Pandya       See flowsheet documentation for full assessment, interventions and recommendations  Outcome: Progressing  Note: Prognosis: Fair  Problem List: Decreased strength, Decreased range of motion, Decreased endurance, Impaired balance, Decreased mobility, Decreased cognition, Impaired judgement, Decreased safety awareness, Decreased skin integrity, Pain, Orthopedic restrictions  Assessment: Pt  supine in bed upon my arrival  Pt  reporting fatigue/pain, however agreeable to therapeutic intervention  Performance of HEP supine in bed with cues provided for proper completion  Progressed with transfers requiring A of therapist with cues for hand placement/technique  Progressed with a limited amb  trial with use of rollator and A of therapist  Pt  transport arrived for pt's scheduled test  Assisted with positioning seated in transport chair at end of treatment session  Pt  remained with pt  transport at end of treatment session  PT will continue to recommend d/c to rehab when medically stable for continued improvement of noted impairments above  Barriers to Discharge: Inaccessible home environment, Decreased caregiver support  Barriers to Discharge Comments: SKY, level of support at home    PT Discharge Recommendation: Post-Acute Rehabilitation Services     PT - OK to Discharge: Yes(if d/c to rehab when medically stable )    See flowsheet documentation for full assessment

## 2020-09-08 NOTE — SPEECH THERAPY NOTE
Speech Language/Pathology  Per time-line charting, pt scheduled for egd at 1pm today  Will f/u as able

## 2020-09-08 NOTE — PHYSICAL THERAPY NOTE
Physical Therapy Progress Note     09/08/20 1226   Pain Assessment   Pain Assessment Tool 0-10   Pain Score 8   Pain Location/Orientation Location: Leg;Orientation: Left   Hospital Pain Intervention(s) Ambulation/increased activity;Repositioned   Restrictions/Precautions   Weight Bearing Precautions Per Order No   Other Precautions Cognitive; Bed Alarm; Chair Alarm; Fall Risk;Pain;Multiple lines;O2   General   Chart Reviewed Yes   Response to Previous Treatment Patient reporting fatigue but able to participate  Family/Caregiver Present No   Subjective   Subjective Willing to participate in therapy this PM    Bed Mobility   Supine to Sit 3  Moderate assistance   Additional items Assist x 1;HOB elevated; Bedrails;Leg ; Increased time required;Verbal cues;LE management   Transfers   Sit to Stand 3  Moderate assistance   Additional items Assist x 1;Bedrails; Increased time required;Verbal cues   Stand to Sit 3  Moderate assistance   Additional items Assist x 1; Armrests; Increased time required;Verbal cues   Ambulation/Elevation   Gait pattern Decreased foot clearance; Forward Flexion; Short stride; Excessively slow; Inconsistent zack; Shuffling; Improper Weight shift; Poor UE support   Gait Assistance 4  Minimal assist   Additional items Assist x 2;Verbal cues; Tactile cues   Assistive Device Other (Comment)  (rollator)   Distance 10'   Balance   Static Sitting Fair +   Dynamic Sitting Fair   Static Standing Fair -   Dynamic Standing Poor +   Ambulatory Poor   Endurance Deficit   Endurance Deficit Yes   Endurance Deficit Description fatigue/pain/weakness   Activity Tolerance   Activity Tolerance Patient limited by fatigue;Patient limited by pain   Medical Staff Made Aware [address]   Nurse Made Aware Yes   Exercises   THR Supine;10 reps;AAROM; Bilateral   Assessment   Prognosis Fair   Problem List Decreased strength;Decreased range of motion;Decreased endurance; Impaired balance;Decreased mobility; Decreased cognition; Impaired judgement;Decreased safety awareness;Decreased skin integrity;Pain;Orthopedic restrictions   Assessment Pt  supine in bed upon my arrival  Pt  reporting fatigue/pain, however agreeable to therapeutic intervention  Performance of HEP supine in bed with cues provided for proper completion  Progressed with transfers requiring A of therapist with cues for hand placement/technique  Progressed with a limited amb  trial with use of rollator and A of therapist  Pt  transport arrived for pt's scheduled test  Assisted with positioning seated in transport chair at end of treatment session  Pt  remained with pt  transport at end of treatment session  PT will continue to recommend d/c to rehab when medically stable for continued improvement of noted impairments above  Barriers to Discharge Inaccessible home environment;Decreased caregiver support   Barriers to Discharge Comments SKY, level of support at home  Goals   Patient Goals To have less pain  STG Expiration Date 09/18/20   PT Treatment Day 2   Plan   Treatment/Interventions Functional transfer training;LE strengthening/ROM; Endurance training; Therapeutic exercise;Gait training;Bed mobility;Spoke to nursing;Spoke to case management;OT   Progress Slow progress, decreased activity tolerance   PT Frequency Other (Comment)  (3-5x/wk)   Recommendation   PT Discharge Recommendation Post-Acute Rehabilitation Services   Equipment Recommended Walker  (RW)   PT - OK to Discharge Yes  (if d/c to rehab when medically stable )     Myranda Suero, PTA

## 2020-09-08 NOTE — TELEPHONE ENCOUNTER
Patient is inpatient  Will leave in [facility] clinical folder until patient is discharged

## 2020-09-08 NOTE — PROGRESS NOTES
CHW contacted Beatrice Callahan to discuss the patients application for her rides  Patients application was submitted aloing with her Mental Health Certification forms  At this time the patients information has been sent over to [facility] for a face to face interview  CHW will remain available until all the patients transportation needs have been met

## 2020-09-08 NOTE — ANESTHESIA POSTPROCEDURE EVALUATION
Post-Op Assessment Note    CV Status:  Stable  Pain Score: 0    Pain management: adequate     Mental Status:  Alert and awake   Hydration Status:  Euvolemic and stable   PONV Controlled:  Controlled   Airway Patency:  Patent      Post Op Vitals Reviewed: Yes      Staff: Anesthesiologist         No complications documented      /84   Pulse 72   Temp 97 9 °F (36 6 °C) (Temporal)   Resp 20   Ht 5' 1" (1 549 m)   Wt 97 5 kg (214 lb 15 2 oz)   SpO2 97%   BMI 40 61 kg/m²       BP      Temp      Pulse     Resp      SpO2

## 2020-09-08 NOTE — UTILIZATION REVIEW
Continued Stay Review  OBS 09-04-20 @ 1630 converted to inpatient 09-05-20 @ 1252 for continuation of care for ambulatory dysfunction, unsafe discharge and the need for placement     Inpatient Admission  Once      Transfer Service: General Medicine       Question  Answer  Comment    Admitting Physician  CHASIDY LOOMIS     Level of Care  Med Surg     Estimated length of stay  More than 2 Midnights     Certification  I certify that inpatient services are medically necessary for this patient for a duration of greater than two midnights  See H&P and MD Progress Notes for additional information about the patient's course of treatment  09/05/20 1252       Date: *09-06-20                         Current Patient Class: inpatient  Current Level of Care: medical    HPI:54 y o  female initially admitted on 09-04-20 as observation   09-05-20 inpatient     Assessment/Plan: patient is not able to ambulate, perform her ADLs, return to her home environment, due to her pain she will require short-term rehab and pain control  PT recommendation is STR  Placement was started 04-09-20  21 SNFs in the area, none able to accept directly from the ED  Her optioning letter from the [facility] is close to it's 180 day expiration from March, SNF's unsure if they can accept on this letter or if pt needs to be re-optioned, unfortunately, [facility] is not available until Tuesday to provide information      Pertinent Labs/Diagnostic Results:   Results from last 7 days   Lab Units 09/08/20  1018 09/04/20  1559   SARS-COV-2  Negative Negative     Results from last 7 days   Lab Units 09/05/20  0644 09/04/20  0928   WBC Thousand/uL 5 63 3 68*   HEMOGLOBIN g/dL 12 6 13 4   HEMATOCRIT % 38 5 41 4   PLATELETS Thousands/uL 230 251   NEUTROS ABS Thousands/µL 3 68 1 62*         Results from last 7 days   Lab Units 09/05/20  0644 09/04/20  0928   SODIUM mmol/L 140 143   POTASSIUM mmol/L 3 1* 3 1*   CHLORIDE mmol/L 104 104   CO2 mmol/L 30 31   ANION GAP mmol/L 6 8   BUN mg/dL 15 14   CREATININE mg/dL 0 98 0 94   EGFR ml/min/1 73sq m 75 78   CALCIUM mg/dL 8 2* 8 3     Results from last 7 days   Lab Units 09/04/20  0928   AST U/L 12   ALT U/L 17   ALK PHOS U/L 121*   TOTAL PROTEIN g/dL 7 2   ALBUMIN g/dL 3 4*   TOTAL BILIRUBIN mg/dL 0 88         Results from last 7 days   Lab Units 09/05/20  0644   GLUCOSE RANDOM mg/dL 91         Results from last 7 days   Lab Units 09/04/20  0922   HEMOGLOBIN A1C % 5 0   EAG mg/dl 97     Results from last 7 days   Lab Units 09/04/20  1100   TROPONIN I ng/mL <0 02     Results from last 7 days   Lab Units 09/04/20  1100   D-DIMER QUANTITATIVE ug/ml FEU 0 62*     Results from last 7 days   Lab Units 09/04/20  0928   PROTIME seconds 13 9   INR  1 09   PTT seconds 33     Results from last 7 days   Lab Units 09/04/20  0928   NT-PRO BNP pg/mL 57     Results from last 7 days   Lab Units 09/04/20  0928   FERRITIN ng/mL 52           Vital Signs:   Date/Time   Temp   Pulse   Resp   BP   MAP (mmHg)   SpO2   O2 Device   Patient Position - Orthostatic VS    09/06/20 0841   98 2 °F (36 8 °C)   79   18   109/64   85   95 %   None (Room air)   Lying    09/05/20 2307   98 5 °F (36 9 °C)   74   18   134/77      95 %   None (Room air)   Lying    09/05/20 2217            137/77                09/05/20 1559   99 °F (37 2 °C)   69   18   103/57   75   96 %   None (Room air)   Lying    09/05/20 0810   97 9 °F (36 6 °C)   66   18   91/54      98 %   None (Room air)   Lying - Orthostatic VS    09/04/20 2306   98 2 °F (36 8 °C)   63   18   132/73      95 %      Lying          Medications:   Scheduled Medications:  acetaminophen, 975 mg, Oral, Q8H KESHIA  amLODIPine, 5 mg, Oral, Daily  ascorbic acid, 500 mg, Oral, BID  busPIRone, 5 mg, Oral, TID  cholecalciferol, 1,000 Units, Oral, Daily  diclofenac sodium, 4 g, Topical, 4x Daily  DULoxetine, 30 mg, Oral, Daily  DULoxetine, 60 mg, Oral, Daily  enoxaparin, 40 mg, Subcutaneous, Q24H [facility]  ferrous sulfate, 325 mg, Oral, Daily With Breakfast  fluticasone, 1 spray, Each Nare, Daily  lidocaine, 1 patch, Topical, Daily  loratadine, 10 mg, Oral, Daily  morphine, 15 mg, Oral, Q12H  oxybutynin, 5 mg, Oral, BID  pantoprazole, 40 mg, Oral, Daily Before Breakfast  prazosin, 2 mg, Oral, HS  pregabalin, 150 mg, Oral, TID  propranolol, 20 mg, Oral, BID  QUEtiapine, 300 mg, Oral, HS  senna-docusate sodium, 1 tablet, Oral, BID  traZODone, 150 mg, Oral, HS      Continuous IV Infusions:  sodium chloride, 125 mL/hr, Intravenous, Continuous      PRN Meds:  hydrOXYzine HCL, 50 mg, Oral, HS PRN  LORazepam, 0 5 mg, Oral, Q8H PRN  naloxone, 0 04 mg, Intravenous, Q1MIN PRN  ondansetron, 4 mg, Intravenous, Q6H PRN  oxyCODONE, 2 5 mg, Oral, Q4H PRN  polyethylene glycol, 17 g, Oral, Daily PRN        Discharge Plan: [facility]    Network Utilization Review Department  [email or web address]  ATTENTION: Please call with any questions or concerns to [phone] and carefully listen to the prompts so that you are directed to the right person  All voicemails are confidential   Maria Isabel Shields all requests for admission clinical reviews, approved or denied determinations and any other requests to dedicated fax number below belonging to the campus where the patient is receiving treatment   List of dedicated fax numbers for the Facilities:  [address] DENIALS (Administrative/Medical Necessity) [phone]   [address] (Maternity/NICU/Pediatrics) [phone]   Skyler Clement [phone]   Ramesh Pompa [phone]   Martina Meraz [phone]   Letty Pino [phone]   [facility] [address] [phone]   [facility]  [phone]   2205 [facility]  [address] [facility] 1000 [address] [facility] [phone]

## 2020-09-08 NOTE — UTILIZATION REVIEW
Initial Clinical Review    Admission: Date/Time/Statement:   Admission Orders (From admission, onward)     Ordered        09/05/20 1252  Inpatient Admission  Once         09/04/20 1630  Place in Observation (expected length of stay for this patient is less than two midnights)  Once                   Orders Placed This Encounter   Procedures    Place in Observation (expected length of stay for this patient is less than two midnights)     Standing Status:   Standing     Number of Occurrences:   1     Order Specific Question:   Admitting Physician     Answer:   George Mora     Order Specific Question:   Level of Care     Answer:   Med Surg [16]    Inpatient Admission     Standing Status:   Standing     Number of Occurrences:   1     Order Specific Question:   Admitting Physician     Answer:   Dennis Taylor [[id number]]     Order Specific Question:   Level of Care     Answer:   Med Surg [16]     Order Specific Question:   Estimated length of stay     Answer:   More than 2 Midnights     Order Specific Question:   Certification     Answer:   I certify that inpatient services are medically necessary for this patient for a duration of greater than two midnights  See H&P and MD Progress Notes for additional information about the patient's course of treatment  ED Arrival Information     Expected Arrival Acuity Means of Arrival Escorted By Service Admission Type    - 9/4/2020 10:01 Urgent Walk-In Self Hospitalist Urgent    Arrival Complaint    medical problem        Chief Complaint   Patient presents with    Difficulty Walking     Pt  reports she is going to have right knee surgery in october  Pt  reports she cant walk   Shortness of Breath     Pt  reports SOB with walking  Assessment/Plan: Patient presents emergency department with ambulatory dysfunction  She is scheduled to have surgery for her right knee in October    She was here the hospital for preadmission testing - was having increased difficulty walking and was having shortness of breath while walking  She has had this as a problem but it has been getting worse over the past week  Patient has been working with her doctors trying to get her to and in patient care facility because she is unable to do the stairs at her house and does have help at home and did not want anyone coming into the house to help her- due to Mehran and there is a new born baby at the house- grand kid  B/L knee pain and feels legs are weak - no new injury       ED Triage Vitals   Temperature Pulse Respirations Blood Pressure SpO2   09/04/20 1004 09/04/20 1004 09/04/20 1004 09/04/20 1004 09/04/20 1004   98 3 °F (36 8 °C) 68 18 120/76 99 %      Temp Source Heart Rate Source Patient Position - Orthostatic VS BP Location FiO2 (%)   09/04/20 1004 09/04/20 1004 09/04/20 1004 09/04/20 1004 --   Temporal Monitor Sitting Right arm       Pain Score       09/04/20 1115       8          Wt Readings from Last 1 Encounters:   09/04/20 97 5 kg (214 lb 15 2 oz)     Additional Vital Signs:   Date/Time   Temp   Pulse   Resp   BP   MAP (mmHg)   SpO2   O2 Device   Patient Position - Orthostatic VS    09/05/20 0810   97 9 °F (36 6 °C)   66   18   91/54      98 %   None (Room air)   Lying - Orthostatic VS    09/04/20 2306   98 2 °F (36 8 °C)   63   18   132/73      95 %      Lying    09/04/20 2052      64      156/94                09/04/20 1842   97 5 °F (36 4 °C)   65   18   144/81      99 %   None (Room air)   Lying    09/04/20 1800      66   18   111/75   85   98 %   None (Room air)   Lying    09/04/20 1651      77   17   104/64      98 %   None (Room air)   Lying    09/04/20 1600      80   18   120/83   101   97 %   None (Room air)   Sitting    09/04/20 1423      95   18         95 %   None (Room air)       09/04/20 1332      62   17   150/78      97 %   None (Room air)   Lying    09/04/20 1228      67   17   147/74      97 %   None (Room air)   Lying    09/04/20 1102      68 17         99 %   None (Room air)          Pertinent Labs/Diagnostic Test Results:   Results from last 7 days   Lab Units 09/08/20  1018 09/04/20  1559   SARS-COV-2  Negative Negative     Results from last 7 days   Lab Units 09/05/20  0644 09/04/20  0928   WBC Thousand/uL 5 63 3 68*   HEMOGLOBIN g/dL 12 6 13 4   HEMATOCRIT % 38 5 41 4   PLATELETS Thousands/uL 230 251   NEUTROS ABS Thousands/µL 3 68 1 62*         Results from last 7 days   Lab Units 09/05/20  0644 09/04/20  0928   SODIUM mmol/L 140 143   POTASSIUM mmol/L 3 1* 3 1*   CHLORIDE mmol/L 104 104   CO2 mmol/L 30 31   ANION GAP mmol/L 6 8   BUN mg/dL 15 14   CREATININE mg/dL 0 98 0 94   EGFR ml/min/1 73sq m 75 78   CALCIUM mg/dL 8 2* 8 3     Results from last 7 days   Lab Units 09/04/20  0928   AST U/L 12   ALT U/L 17   ALK PHOS U/L 121*   TOTAL PROTEIN g/dL 7 2   ALBUMIN g/dL 3 4*   TOTAL BILIRUBIN mg/dL 0 88         Results from last 7 days   Lab Units 09/05/20  0644   GLUCOSE RANDOM mg/dL 91         Results from last 7 days   Lab Units 09/04/20  0922   HEMOGLOBIN A1C % 5 0   EAG mg/dl 97     Results from last 7 days   Lab Units 09/04/20  1100   TROPONIN I ng/mL <0 02     Results from last 7 days   Lab Units 09/04/20  1100   D-DIMER QUANTITATIVE ug/ml FEU 0 62*     Results from last 7 days   Lab Units 09/04/20  0928   PROTIME seconds 13 9   INR  1 09   PTT seconds 33       Results from last 7 days   Lab Units 09/04/20  0928   NT-PRO BNP pg/mL 57     Results from last 7 days   Lab Units 09/04/20  0928   FERRITIN ng/mL 52     CTA chest PE  09-04-20  No acute intrathoracic pathology identified, consistent with x-rays   Specifically, no evidence of pulmonary embolism   Suspected reflux esophagitis   Multilevel thoracolumbar spinal fusion surgery   Scoliosis     CXR 09-04-20  No acute consolidation or congestion    ED Treatment:   Medication Administration from 09/04/2020 1001 to 09/04/2020 1845       Date/Time Order Dose Route Action     09/04/2020 1100 potassium chloride (K-DUR,KLOR-CON) CR tablet 20 mEq 20 mEq Oral Given     09/04/2020 1115 ketorolac (TORADOL) injection 15 mg 15 mg Intravenous Given     09/04/2020 1225 iohexol (OMNIPAQUE) 350 MG/ML injection (MULTI-DOSE) 100 mL 100 mL Intravenous Given     09/04/2020 1804 morphine (MSIR) IR tablet 15 mg 15 mg Oral Given        Past Medical History:   Diagnosis Date    Acid reflux     Anxiety     RESOLVED: [id number]    Arthritis     Bipolar 2 disorder ([facility])     FOLLOWS WITH PSYCHIATRIST  CONTINUE LAMOTRIGINE; RESOLVED: [id number]    Depression     Familial tremor     both hands    Fibromyalgia     LAST ASSESSED: [id number]    Hearing aid worn     left ear    [place] (hard of hearing)     left ear    Hypertension     Left-sided weakness     Lower back pain     Memory loss of unknown cause     long and short term    Migraine     Overactive bladder     Panic attack     Post traumatic stress disorder     Seasonal allergies     Stroke [facility])     questionable stroke 2009    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    CONT ASA 81 MG DAILY; RESOLVED: [id number]    Urinary incontinence     Wears dentures     partial lower / full upper    Wears glasses      Present on Admission:   Chronic bilateral low back pain with bilateral sciatica   Anxiety   Bipolar II disorder ([facility])   Esophageal reflux   Hypertension   Hypokalemia   Insomnia   Urinary incontinence   Tardive dyskinesia   Ambulatory dysfunction   Right knee pain   Dysphagia      Admitting Diagnosis: Bipolar II disorder ([facility] [address] ) [F31 81]  Knee pain [M25 569]  Asthenia [R53 1]  Anxiety disorder due to multiple medical problems [F06 8]  Ambulatory dysfunction [R26 2]  Age/Sex: 62 y o  female  Admission Orders:  Scheduled Medications:  acetaminophen, 975 mg, Oral, Q8H [address]  amLODIPine, 5 mg, Oral, Daily  ascorbic acid, 500 mg, Oral, BID  busPIRone, 5 mg, Oral, TID  cholecalciferol, 1,000 Units, Oral, Daily  diclofenac sodium, 4 g, Topical, 4x Daily  DULoxetine, 30 mg, Oral, Daily  DULoxetine, 60 mg, Oral, Daily  enoxaparin, 40 mg, Subcutaneous, Q24H KESHIA  ferrous sulfate, 325 mg, Oral, Daily With Breakfast  fluticasone, 1 spray, Each Nare, Daily  lidocaine, 1 patch, Topical, Daily  loratadine, 10 mg, Oral, Daily  morphine, 15 mg, Oral, Q12H  oxybutynin, 5 mg, Oral, BID  pantoprazole, 40 mg, Oral, Daily Before Breakfast  prazosin, 2 mg, Oral, HS  pregabalin, 150 mg, Oral, TID  propranolol, 20 mg, Oral, BID  QUEtiapine, 300 mg, Oral, HS  senna-docusate sodium, 1 tablet, Oral, BID  traZODone, 150 mg, Oral, HS      Continuous IV Infusions:  sodium chloride, 125 mL/hr, Intravenous, Continuous      PRN Meds:  hydrOXYzine HCL, 50 mg, Oral, HS PRN  LORazepam, 0 5 mg, Oral, Q8H PRN  naloxone, 0 04 mg, Intravenous, Q1MIN PRN  ondansetron, 4 mg, Intravenous, Q6H PRN  oxyCODONE, 2 5 mg, Oral, Q4H PRN  polyethylene glycol, 17 g, Oral, Daily PRN        IP CONSULT TO PSYCHIATRY  IP CONSULT TO CASE MANAGEMENT  IP CONSULT TO ORTHOPEDIC SURGERY  IP CONSULT TO GASTROENTEROLOGY   PT/OT/Speech  SCD    Network Utilization Review Department  [email or web address]  ATTENTION: Please call with any questions or concerns to [phone] and carefully listen to the prompts so that you are directed to the right person  All voicemails are confidential   Terrence Rabago all requests for admission clinical reviews, approved or denied determinations and any other requests to dedicated fax number below belonging to the campus where the patient is receiving treatment   List of dedicated fax numbers for the Facilities:  FACILITY NAME UR FAX NUMBER   ADMISSION DENIALS (Administrative/Medical Necessity) [phone]   [address] (Maternity/NICU/Pediatrics) [phone]   Haresh Olivier [phone]     [address] [facility] [address] [phone]   [facility]  [phone]   2205 [facility]  [phone] [address] [phone]

## 2020-09-08 NOTE — TELEPHONE ENCOUNTER
Spoke to daughter Srini Collins and asked her to keep me informed regarding patient status  Has been admitted for gait dysfunction and medical workup  States she call with any updates impacting her surgery on 10/7/20

## 2020-09-08 NOTE — PLAN OF CARE
Problem: Potential for Falls  Goal: Patient will remain free of falls  Description: INTERVENTIONS:  - Assess patient frequently for physical needs  -  Identify cognitive and physical deficits and behaviors that affect risk of falls  -  Oral fall precautions as indicated by assessment   - Educate patient/family on patient safety including physical limitations  - Instruct patient to call for assistance with activity based on assessment  - Modify environment to reduce risk of injury  - Consider OT/PT consult to assist with strengthening/mobility  Outcome: Progressing     Problem: Nutrition/Hydration-ADULT  Goal: Nutrient/Hydration intake appropriate for improving, restoring or maintaining nutritional needs  Description: Monitor and assess patient's nutrition/hydration status for malnutrition  Collaborate with interdisciplinary team and initiate plan and interventions as ordered  Monitor patient's weight and dietary intake as ordered or per policy  Utilize nutrition screening tool and intervene as necessary  Determine patient's food preferences and provide high-protein, high-caloric foods as appropriate       INTERVENTIONS:  - Monitor oral intake, urinary output, labs, and treatment plans  - Assess nutrition and hydration status and recommend course of action  - Evaluate amount of meals eaten  - Assist patient with eating if necessary   - Allow adequate time for meals  - Recommend/ encourage appropriate diets, oral nutritional supplements, and vitamin/mineral supplements  - Order, calculate, and assess calorie counts as needed  - Recommend, monitor, and adjust tube feedings and TPN/PPN based on assessed needs  - Assess need for intravenous fluids  - Provide specific nutrition/hydration education as appropriate  - Include patient/family/caregiver in decisions related to nutrition  Outcome: Progressing     Problem: Prexisting or High Potential for Compromised Skin Integrity  Goal: Skin integrity is maintained or improved  Description: INTERVENTIONS:  - Identify patients at risk for skin breakdown  - Assess and monitor skin integrity  - Assess and monitor nutrition and hydration status  - Monitor labs   - Assess for incontinence   - Turn and reposition patient  - Assist with mobility/ambulation  - Relieve pressure over bony prominences  - Avoid friction and shearing  - Provide appropriate hygiene as needed including keeping skin clean and dry  - Evaluate need for skin moisturizer/barrier cream  - Collaborate with interdisciplinary team   - Patient/family teaching  - Consider wound care consult   Outcome: Progressing     Problem: PAIN - ADULT  Goal: Verbalizes/displays adequate comfort level or baseline comfort level  Description: Interventions:  - Encourage patient to monitor pain and request assistance  - Assess pain using appropriate pain scale  - Administer analgesics based on type and severity of pain and evaluate response  - Implement non-pharmacological measures as appropriate and evaluate response  - Consider cultural and social influences on pain and pain management  - Notify physician/advanced practitioner if interventions unsuccessful or patient reports new pain  Outcome: Progressing     Problem: INFECTION - ADULT  Goal: Absence or prevention of progression during hospitalization  Description: INTERVENTIONS:  - Assess and monitor for signs and symptoms of infection  - Monitor lab/diagnostic results  - Monitor all insertion sites, i e  indwelling lines, tubes, and drains  - Monitor endotracheal if appropriate and nasal secretions for changes in amount and color  - [place] appropriate cooling/warming therapies per order  - Administer medications as ordered  - Instruct and encourage patient and family to use good hand hygiene technique  - Identify and instruct in appropriate isolation precautions for identified infection/condition  Outcome: Progressing  Goal: Absence of fever/infection during neutropenic period  Description: INTERVENTIONS:  - Monitor WBC    Outcome: Progressing     Problem: SAFETY ADULT  Goal: Patient will remain free of falls  Description: INTERVENTIONS:  - Assess patient frequently for physical needs  -  Identify cognitive and physical deficits and behaviors that affect risk of falls    -  [place] fall precautions as indicated by assessment   - Educate patient/family on patient safety including physical limitations  - Instruct patient to call for assistance with activity based on assessment  - Modify environment to reduce risk of injury  - Consider OT/PT consult to assist with strengthening/mobility  Outcome: Progressing  Goal: Maintain or return to baseline ADL function  Description: INTERVENTIONS:  -  Assess patient's ability to carry out ADLs; assess patient's baseline for ADL function and identify physical deficits which impact ability to perform ADLs (bathing, care of mouth/teeth, toileting, grooming, dressing, etc )  - Assess/evaluate cause of self-care deficits   - Assess range of motion  - Assess patient's mobility; develop plan if impaired  - Assess patient's need for assistive devices and provide as appropriate  - Encourage maximum independence but intervene and supervise when necessary  - Involve family in performance of ADLs  - Assess for home care needs following discharge   - Consider OT consult to assist with ADL evaluation and planning for discharge  - Provide patient education as appropriate  Outcome: Progressing  Goal: Maintain or return mobility status to optimal level  Description: INTERVENTIONS:  - Assess patient's baseline mobility status (ambulation, transfers, stairs, etc )    - Identify cognitive and physical deficits and behaviors that affect mobility  - Identify mobility aids required to assist with transfers and/or ambulation (gait belt, sit-to-stand, lift, walker, cane, etc )  - [place] fall precautions as indicated by assessment  - Record patient progress and toleration of activity level on Mobility SBAR; progress patient to next Phase/Stage  - Instruct patient to call for assistance with activity based on assessment  - Consider rehabilitation consult to assist with strengthening/weightbearing, etc   Outcome: Progressing     Problem: DISCHARGE PLANNING  Goal: Discharge to home or other facility with appropriate resources  Description: INTERVENTIONS:  - Identify barriers to discharge w/patient and caregiver  - Arrange for needed discharge resources and transportation as appropriate  - Identify discharge learning needs (meds, wound care, etc )  - Arrange for interpretive services to assist at discharge as needed  - Refer to Case Management Department for coordinating discharge planning if the patient needs post-hospital services based on physician/advanced practitioner order or complex needs related to functional status, cognitive ability, or social support system  Outcome: Progressing     Problem: Knowledge Deficit  Goal: Patient/family/caregiver demonstrates understanding of disease process, treatment plan, medications, and discharge instructions  Description: Complete learning assessment and assess knowledge base    Interventions:  - Provide teaching at level of understanding  - Provide teaching via preferred learning methods  Outcome: Progressing     Problem: NEUROSENSORY - ADULT  Goal: Achieves stable or improved neurological status  Description: INTERVENTIONS  - Monitor and report changes in neurological status  - Monitor vital signs such as temperature, blood pressure, glucose, and any other labs ordered   - Initiate measures to prevent increased intracranial pressure  - Monitor for seizure activity and implement precautions if appropriate      Outcome: Progressing  Goal: Remains free of injury related to seizures activity  Description: INTERVENTIONS  - Maintain airway, patient safety  and administer oxygen as ordered  - Monitor patient for seizure activity, document and report duration and description of seizure to physician/advanced practitioner  - If seizure occurs,  ensure patient safety during seizure  - Reorient patient post seizure  - Seizure pads on all 4 side rails  - Instruct patient/family to notify RN of any seizure activity including if an aura is experienced  - Instruct patient/family to call for assistance with activity based on nursing assessment  - Administer anti-seizure medications if ordered    Outcome: Progressing  Goal: Achieves maximal functionality and self care  Description: INTERVENTIONS  - Monitor swallowing and airway patency with patient fatigue and changes in neurological status  - Encourage and assist patient to increase activity and self care     - Encourage visually impaired, hearing impaired and aphasic patients to use assistive/communication devices  Outcome: Progressing     Problem: CARDIOVASCULAR - ADULT  Goal: Maintains optimal cardiac output and hemodynamic stability  Description: INTERVENTIONS:  - Monitor I/O, vital signs and rhythm  - Monitor for S/S and trends of decreased cardiac output  - Administer and titrate ordered vasoactive medications to optimize hemodynamic stability  - Assess quality of pulses, skin color and temperature  - Assess for signs of decreased coronary artery perfusion  - Instruct patient to report change in severity of symptoms  Outcome: Progressing  Goal: Absence of cardiac dysrhythmias or at baseline rhythm  Description: INTERVENTIONS:  - Continuous cardiac monitoring, vital signs, obtain 12 lead EKG if ordered  - Administer antiarrhythmic and heart rate control medications as ordered  - Monitor electrolytes and administer replacement therapy as ordered  Outcome: Progressing     Problem: RESPIRATORY - ADULT  Goal: Achieves optimal ventilation and oxygenation  Description: INTERVENTIONS:  - Assess for changes in respiratory status  - Assess for changes in mentation and behavior  - Position to facilitate oxygenation and minimize respiratory effort  - Oxygen administered by appropriate delivery if ordered  - Initiate smoking cessation education as indicated  - Encourage broncho-pulmonary hygiene including cough, deep breathe, Incentive Spirometry  - Assess the need for suctioning and aspirate as needed  - Assess and instruct to report SOB or any respiratory difficulty  - Respiratory Therapy support as indicated  Outcome: Progressing     Problem: GASTROINTESTINAL - ADULT  Goal: Minimal or absence of nausea and/or vomiting  Description: INTERVENTIONS:  - Administer IV fluids if ordered to ensure adequate hydration  - Maintain NPO status until nausea and vomiting are resolved  - Nasogastric tube if ordered  - Administer ordered antiemetic medications as needed  - Provide nonpharmacologic comfort measures as appropriate  - Advance diet as tolerated, if ordered  - Consider nutrition services referral to assist patient with adequate nutrition and appropriate food choices  Outcome: Progressing  Goal: Maintains or returns to baseline bowel function  Description: INTERVENTIONS:  - Assess bowel function  - Encourage oral fluids to ensure adequate hydration  - Administer IV fluids if ordered to ensure adequate hydration  - Administer ordered medications as needed  - Encourage mobilization and activity  - Consider nutritional services referral to assist patient with adequate nutrition and appropriate food choices  Outcome: Progressing  Goal: Maintains adequate nutritional intake  Description: INTERVENTIONS:  - Monitor percentage of each meal consumed  - Identify factors contributing to decreased intake, treat as appropriate  - Assist with meals as needed  - Monitor I&O, weight, and lab values if indicated  - Obtain nutrition services referral as needed  Outcome: Progressing     Problem: GENITOURINARY - ADULT  Goal: Maintains or returns to baseline urinary function  Description: INTERVENTIONS:  - Assess urinary function  - Encourage oral fluids to ensure adequate hydration if ordered  - Administer IV fluids as ordered to ensure adequate hydration  - Administer ordered medications as needed  - Offer frequent toileting  - Follow urinary retention protocol if ordered  Outcome: Progressing  Goal: Absence of urinary retention  Description: INTERVENTIONS:  - Assess patients ability to void and empty bladder  - Monitor I/O  - Bladder scan as needed  - Discuss with physician/AP medications to alleviate retention as needed  - Discuss catheterization for long term situations as appropriate  Outcome: Progressing     Problem: METABOLIC, FLUID AND ELECTROLYTES - ADULT  Goal: Electrolytes maintained within normal limits  Description: INTERVENTIONS:  - Monitor labs and assess patient for signs and symptoms of electrolyte imbalances  - Administer electrolyte replacement as ordered  - Monitor response to electrolyte replacements, including repeat lab results as appropriate  - Instruct patient on fluid and nutrition as appropriate  Outcome: Progressing  Goal: Fluid balance maintained  Description: INTERVENTIONS:  - Monitor labs   - Monitor I/O and WT  - Instruct patient on fluid and nutrition as appropriate  - Assess for signs & symptoms of volume excess or deficit  Outcome: Progressing  Goal: Glucose maintained within target range  Description: INTERVENTIONS:  - Monitor Blood Glucose as ordered  - Assess for signs and symptoms of hyperglycemia and hypoglycemia  - Administer ordered medications to maintain glucose within target range  - Assess nutritional intake and initiate nutrition service referral as needed  Outcome: Progressing     Problem: SKIN/TISSUE INTEGRITY - ADULT  Goal: Skin integrity remains intact  Description: INTERVENTIONS  - Identify patients at risk for skin breakdown  - Assess and monitor skin integrity  - Assess and monitor nutrition and hydration status  - Monitor labs (i e  albumin)  - Assess for incontinence   - Turn and reposition patient  - Assist with mobility/ambulation  - Relieve pressure over bony prominences  - Avoid friction and shearing  - Provide appropriate hygiene as needed including keeping skin clean and dry  - Evaluate need for skin moisturizer/barrier cream  - Collaborate with interdisciplinary team (i e  Nutrition, Rehabilitation, etc )   - Patient/family teaching  Outcome: Progressing  Goal: Oral mucous membranes remain intact  Description: INTERVENTIONS  - Assess oral mucosa and hygiene practices  - Implement preventative oral hygiene regimen  - Implement oral medicated treatments as ordered  - Initiate Nutrition services referral as needed  Outcome: Progressing     Problem: HEMATOLOGIC - ADULT  Goal: Maintains hematologic stability  Description: INTERVENTIONS  - Assess for signs and symptoms of bleeding or hemorrhage  - Monitor labs  - Administer supportive blood products/factors as ordered and appropriate  Outcome: Progressing     Problem: MUSCULOSKELETAL - ADULT  Goal: Maintain or return mobility to safest level of function  Description: INTERVENTIONS:  - Assess patient's ability to carry out ADLs; assess patient's baseline for ADL function and identify physical deficits which impact ability to perform ADLs (bathing, care of mouth/teeth, toileting, grooming, dressing, etc )  - Assess/evaluate cause of self-care deficits   - Assess range of motion  - Assess patient's mobility  - Assess patient's need for assistive devices and provide as appropriate  - Encourage maximum independence but intervene and supervise when necessary  - Involve family in performance of ADLs  - Assess for home care needs following discharge   - Consider OT consult to assist with ADL evaluation and planning for discharge  - Provide patient education as appropriate  Outcome: Progressing  Goal: Maintain proper alignment of affected body part  Description: INTERVENTIONS:  - Support, maintain and protect limb and body alignment  - Provide patient/ family with appropriate education  Outcome: Progressing

## 2020-09-09 ENCOUNTER — PATIENT OUTREACH (OUTPATIENT)
Dept: INTERNAL MEDICINE CLINIC | Facility: CLINIC | Age: 55
End: 2020-09-09

## 2020-09-09 VITALS
HEIGHT: 61 IN | HEART RATE: 73 BPM | RESPIRATION RATE: 17 BRPM | TEMPERATURE: 98.4 F | SYSTOLIC BLOOD PRESSURE: 111 MMHG | DIASTOLIC BLOOD PRESSURE: 71 MMHG | BODY MASS INDEX: 40.58 KG/M2 | OXYGEN SATURATION: 97 % | WEIGHT: 214.95 LBS

## 2020-09-09 PROCEDURE — G0425 INPT/ED TELECONSULT30: HCPCS | Performed by: NURSE PRACTITIONER

## 2020-09-09 PROCEDURE — 99239 HOSP IP/OBS DSCHRG MGMT >30: CPT | Performed by: HOSPITALIST

## 2020-09-09 PROCEDURE — 92526 ORAL FUNCTION THERAPY: CPT

## 2020-09-09 PROCEDURE — 97116 GAIT TRAINING THERAPY: CPT

## 2020-09-09 PROCEDURE — 97110 THERAPEUTIC EXERCISES: CPT

## 2020-09-09 PROCEDURE — 99232 SBSQ HOSP IP/OBS MODERATE 35: CPT | Performed by: INTERNAL MEDICINE

## 2020-09-09 PROCEDURE — 97530 THERAPEUTIC ACTIVITIES: CPT

## 2020-09-09 RX ORDER — TRAZODONE HYDROCHLORIDE 150 MG/1
150 TABLET ORAL
Qty: 30 TABLET | Refills: 0 | Status: SHIPPED | OUTPATIENT
Start: 2020-09-09 | End: 2021-03-17 | Stop reason: ALTCHOICE

## 2020-09-09 RX ORDER — MORPHINE SULFATE 15 MG/1
15 TABLET, FILM COATED, EXTENDED RELEASE ORAL 2 TIMES DAILY
Qty: 20 TABLET | Refills: 0 | Status: SHIPPED | OUTPATIENT
Start: 2020-09-09 | End: 2021-03-09 | Stop reason: SDUPTHER

## 2020-09-09 RX ORDER — OXYCODONE HYDROCHLORIDE 5 MG/1
2.5 TABLET ORAL EVERY 4 HOURS PRN
Qty: 30 TABLET | Refills: 0 | Status: SHIPPED | OUTPATIENT
Start: 2020-09-09 | End: 2020-09-19

## 2020-09-09 RX ORDER — LORAZEPAM 0.5 MG/1
0.5 TABLET ORAL EVERY 8 HOURS PRN
Qty: 20 TABLET | Refills: 0 | Status: SHIPPED | OUTPATIENT
Start: 2020-09-09 | End: 2021-05-21

## 2020-09-09 RX ADMIN — FERROUS SULFATE TAB 325 MG (65 MG ELEMENTAL FE) 325 MG: 325 (65 FE) TAB at 08:43

## 2020-09-09 RX ADMIN — FLUTICASONE PROPIONATE 1 SPRAY: 50 SPRAY, METERED NASAL at 08:48

## 2020-09-09 RX ADMIN — DULOXETINE HYDROCHLORIDE 60 MG: 60 CAPSULE, DELAYED RELEASE ORAL at 08:43

## 2020-09-09 RX ADMIN — ENOXAPARIN SODIUM 40 MG: 40 INJECTION SUBCUTANEOUS at 08:48

## 2020-09-09 RX ADMIN — SODIUM CHLORIDE 125 ML/HR: 0.9 INJECTION, SOLUTION INTRAVENOUS at 05:55

## 2020-09-09 RX ADMIN — PREGABALIN 150 MG: 75 CAPSULE ORAL at 08:43

## 2020-09-09 RX ADMIN — PROPRANOLOL HYDROCHLORIDE 20 MG: 20 TABLET ORAL at 08:43

## 2020-09-09 RX ADMIN — OXYBUTYNIN CHLORIDE 5 MG: 5 TABLET ORAL at 08:43

## 2020-09-09 RX ADMIN — Medication 1000 UNITS: at 08:42

## 2020-09-09 RX ADMIN — DULOXETINE HYDROCHLORIDE 30 MG: 30 CAPSULE, DELAYED RELEASE ORAL at 08:44

## 2020-09-09 RX ADMIN — LORATADINE 10 MG: 5 SOLUTION ORAL at 08:48

## 2020-09-09 RX ADMIN — MORPHINE SULFATE 15 MG: 15 TABLET ORAL at 05:56

## 2020-09-09 RX ADMIN — ACETAMINOPHEN 975 MG: 325 TABLET ORAL at 13:26

## 2020-09-09 RX ADMIN — DICLOFENAC SODIUM 4 G: 10 GEL TOPICAL at 11:32

## 2020-09-09 RX ADMIN — PANTOPRAZOLE SODIUM 40 MG: 40 TABLET, DELAYED RELEASE ORAL at 06:04

## 2020-09-09 RX ADMIN — OXYCODONE HYDROCHLORIDE AND ACETAMINOPHEN 500 MG: 500 TABLET ORAL at 08:43

## 2020-09-09 RX ADMIN — SENNOSIDES AND DOCUSATE SODIUM 1 TABLET: 8.6; 5 TABLET ORAL at 08:43

## 2020-09-09 RX ADMIN — LIDOCAINE 1 PATCH: 50 PATCH CUTANEOUS at 08:48

## 2020-09-09 RX ADMIN — ACETAMINOPHEN 975 MG: 325 TABLET ORAL at 05:57

## 2020-09-09 RX ADMIN — BUSPIRONE HYDROCHLORIDE 5 MG: 5 TABLET ORAL at 08:43

## 2020-09-09 RX ADMIN — AMLODIPINE BESYLATE 5 MG: 5 TABLET ORAL at 08:43

## 2020-09-09 NOTE — PROGRESS NOTES
SW received call from pt who was trying to get the # for her [facility] at [facility] ([phone])  SW later called her back and left her # in a voice mail for her  SW confirmed pt is working on Placement from [place] which HIRAM encouraged as it is the safest course for her  OP SW will remain available to assist pt if needed when she return to OP setting

## 2020-09-09 NOTE — TELEMEDICINE
TeleConsultation - 0432 [facility] Lupis Moreno 62 y o  female MRN: [id number]  Unit/Bed#: E5 -01 Encounter: [id number]      REQUIRED DOCUMENTATION:     1  This service was provided via Telemedicine  2  Provider located at [facility]  3  TeleMed provider: MERCY Kirk  4  Identify all parties in room with patient during tele consult: Mee Hernandez, ERIBERTO student  5  After connecting through televideo, patient was identified by name and date of birth and assistant checked wristband  Patient was then informed that this was a Telemedicine visit and that the exam was being conducted confidentially over secure lines  My office door was closed  The patient was notified the following individuals were present in the room: Mee Hernandez  Patient acknowledged consent and understanding of privacy and security of the Telemedicine visit, and gave us permission to have the assistant stay in the room in order to assist with the history and to conduct the exam   I informed the patient that I have reviewed their record in Epic and presented the opportunity for them to ask any questions regarding the visit today  The patient agreed to participate  Assessment/Plan     Assessment:    Patient assessment was done today, she was alert and oriented x4  Patient states that last night she had a panic attack because she is feeling "overwhelmed" with her chronic medical issues  Patient was tearful during our conversation, and kept perseverating that she is upset related to difficulty ambulating and unable to play with her grandchildren, patient also reminisce on her health when she was younger; 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   Patient states that she had surgery spinal surgery in 2018 and since then her health has gone downhill    Patient states that she has gained approximately 140 lb over the last 8 months due to her sedentary lifestyle and current medications  Patient currently lives with her daughter and her daughter's 4 children, house has stairs and she is unable to go up or down the home  Patient states that she is looking for a suitable home that is more accommodating after short-term rehabilitation  Patient currently denies suicidal ideation, denies hallucinations, denies delusions and denies paranoia  Patient did states that she has depression and anxiety related to her the unknown and her health issues; and she had recent panic attack  We discussed keeping her current psych medications the same and Psychiatry is deferring to outpatient psychiatric services at the [facility] for medication and symptoms management  Plan:   1  Recommend patient continue on current psychiatric medications (Buspar 5 mg/daily, Cymbalta 90 mg/daily, Lyrica 150/TID, Seroquel 300 mg at bedtime, Prazosin 2 mg at bedtime and Trazodone 150 mg at bedtime)  2  Recommend patient to follow-up with outpatient psychiatrist for symptoms and medication management  3  Recommend that patient continue on Ingrezza for Tardive Dyskinesia  4  Recommend CM set up out patient Therapy sessions (CBT)  5  Patient does not meet criteria for inpatient psychiatric admission; and can be discharged to Rehab for continuation of care when medically cleared  6   Psychiatry is signing off      Risks, benefits and possible side effects of Medications:   Risks, benefits, and possible side effects of medications explained to patient and patient verbalizes understanding  Chief Complaint: "I had a panic attack last night because I am overwhelmed"    History of Present Illness     Reason for Consult / Principal Problem: Evaluation for Rehab Placement    Patient is a 62 y o  female with history of bipolar disorder, depression,  anxiety disorders and tardive dyskinesia presented to the emergency department complaining of knee pain    Patient reports worsening knee pain over the last few weeks and she was unable to bear her weight and states her knee gave out going down the stairs and she had a near fall  Primary complaints include: difficulty ambulating, anxiety attacks, chronic pain, concern about health problems and difficulty sleeping  Onset of symptoms was gradual started since her spinal surgery in 2018 and gradually worsening course since that time  Psychosocial Stressors: health and social     Inpatient consult to Psychiatry  Consult performed by: MERCY Benson  Consult ordered by: Chitra Garcia DO          Psychiatric Review Of Systems:  sleep: yes  appetite changes: yes, increased  weight changes: yes, weight gain 140 pounds over the last 8 months  energy/anergy: no  interest/pleasure/anhedonia: yes  somatic symptoms: no  anxiety/panic: yes, panic  lazarus: no  guilty/hopeless: no  self injurious behavior/risky behavior: no    Historical Information   Past Psychiatric History:  Patient currently see a psychiatrist at [place] [address]  Past Suicide attempts: yes, one prior years ago by overdose  Past Violent behavior: denies  Past Psychiatric medication trial: Multiple medication trails    Substance Abuse History:  Illicit drugs: Pt denies  Use of Alcohol: denied    Longest clean time: n/a  History of IP/OP rehabilitation program: n/a  Smoking history: n/a  Use of Caffeine: coffee 2 cups /day    Family Psychiatric History: Patient denies    Social History  Marital history: single  Living arrangement, social support: The patient lives in home with daughter and her daughter's 4 children  Occupational History: on permanent disability  Functioning Relationships: good support system  Traumatic History:   Abuse: Unknown  Other Traumatic Events: Unknown    Past Medical History:   Diagnosis Date    Acid reflux     Anxiety     RESOLVED: [id number]    Arthritis     Bipolar 2 disorder (HCC)     FOLLOWS WITH PSYCHIATRIST   CONTINUE LAMOTRIGINE; RESOLVED: [id number]    Depression     Familial tremor     both hands    Fibromyalgia     LAST ASSESSED: [id number]    Hearing aid worn     left ear    [place] (hard of hearing)     left ear    Hypertension     Left-sided weakness     Lower back pain     Memory loss of unknown cause     long and short term    Migraine     Overactive bladder     Panic attack     Post traumatic stress disorder     Seasonal allergies     Stroke [facility])     questionable stroke 2009    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    CONT ASA 81 MG DAILY; RESOLVED: [id number]    Urinary incontinence     Wears dentures     partial lower / full upper    Wears glasses        Medical Review Of Systems:  Review of Systems    Meds/Allergies   all current active meds have been reviewed and current meds:   Current Facility-Administered Medications   Medication Dose Route Frequency    acetaminophen (TYLENOL) tablet 975 mg  975 mg Oral Q8H [address]    amLODIPine (NORVASC) tablet 5 mg  5 mg Oral Daily    ascorbic acid (VITAMIN C) tablet 500 mg  500 mg Oral BID    busPIRone (BUSPAR) tablet 5 mg  5 mg Oral TID    cholecalciferol (VITAMIN D3) tablet 1,000 Units  1,000 Units Oral Daily    diclofenac sodium (VOLTAREN) 1 % topical gel 4 g  4 g Topical 4x Daily    DULoxetine (CYMBALTA) delayed release capsule 30 mg  30 mg Oral Daily    DULoxetine (CYMBALTA) delayed release capsule 60 mg  60 mg Oral Daily    enoxaparin (LOVENOX) subcutaneous injection 40 mg  40 mg Subcutaneous Q24H KESHIA    ferrous sulfate tablet 325 mg  325 mg Oral Daily With Breakfast    fluticasone (FLONASE) 50 mcg/act nasal spray 1 spray  1 spray Each Nare Daily    hydrOXYzine HCL (ATARAX) tablet 50 mg  50 mg Oral HS PRN    lidocaine (LIDODERM) 5 % patch 1 patch  1 patch Topical Daily    loratadine (CLARITIN) oral syrup 10 mg  10 mg Oral Daily    LORazepam (ATIVAN) tablet 0 5 mg  0 5 mg Oral Q8H PRN    morphine (MSIR) IR tablet 15 mg  15 mg Oral Q12H    naloxone (NARCAN) 0 04 mg/mL syringe 0 04 mg  0 04 mg Intravenous Q1MIN PRN    ondansetron (ZOFRAN) injection 4 mg  4 mg Intravenous Q6H PRN    oxybutynin (DITROPAN) tablet 5 mg  5 mg Oral BID    oxyCODONE (ROXICODONE) IR tablet 2 5 mg  2 5 mg Oral Q4H PRN    pantoprazole (PROTONIX) EC tablet 40 mg  40 mg Oral Daily Before Breakfast    polyethylene glycol (MIRALAX) packet 17 g  17 g Oral Daily PRN    prazosin (MINIPRESS) capsule 2 mg  2 mg Oral HS    pregabalin (LYRICA) capsule 150 mg  150 mg Oral TID    propranolol (INDERAL) tablet 20 mg  20 mg Oral BID    QUEtiapine (SEROquel XR) 24 hr tablet 300 mg  300 mg Oral HS    senna-docusate sodium (SENOKOT S) 8 6-50 mg per tablet 1 tablet  1 tablet Oral BID    sodium chloride 0 9 % infusion  125 mL/hr Intravenous Continuous    traZODone (DESYREL) tablet 150 mg  150 mg Oral HS     No Known Allergies    Objective   Vital signs in last 24 hours:  Temp:  [97 7 °F (36 5 °C)-98 4 °F (36 9 °C)] 97 7 °F (36 5 °C)  HR:  [66-70] 68  Resp:  [18-20] 18  BP: (119-154)/(63-96) 119/79      Intake/Output Summary (Last 24 hours) at 9/9/2020 1400  Last data filed at 9/9/2020 0601  Gross per 24 hour   Intake 1700 ml   Output    Net 1700 ml       Mental Status Evaluation:  Appearance:  casually dressed and overweight   Behavior:  normal   Speech:  Slurred   Mood:  anxious, sad and tearful   Affect:  labile   Thought Process:  goal directed and logical   Thought Content:  normal   Perceptual Disturbances: None   Risk Potential: Suicidal Ideations none, Homicidal Ideations none and Potential for Aggression No   Sensorium:  person, place, time/date and situation   Cognition:  recent and remote memory grossly intact   Consciousness:  alert and awake    Attention: attention span and concentration were age appropriate   Intellect: within normal limits   Fund of Knowledge: past history: pt was able to recall past events   Insight:  fair   Judgment: fair   Muscle Strength and Tone: Unable to assess Gait/Station: Unable to assess   Motor Activity: abnormal movement noted  dyskinetic movements of the tongue     Lab Results: All pertinent labs reviewed  I  Code Status: Level 1 - Full Code  Advance Directive and Living Will:      Power of :    POLST:      Counseling / Coordination of Care  Total floor / unit time spent today 30  minutes  Greater than 50% of total time was spent with the patient and / or family counseling and / or coordination of care   A description of the counseling / coordination of care:

## 2020-09-09 NOTE — CASE MANAGEMENT
This case management assistant (CMA) met with the patient and attempted to review her medicare rights with her  She declined to sign the IMM for this CMA  This CMA documented he refusal to sign

## 2020-09-09 NOTE — SPEECH THERAPY NOTE
Speech Language/Pathology    Speech/Language Pathology Progress Note    Patient Name: Kalli Ronquillo  UZUHL'A Date: 2020     Problem List  Principal Problem:    Ambulatory dysfunction  Active Problems:    Chronic bilateral low back pain with bilateral sciatica    Right knee pain    Anxiety    Bipolar II disorder (HCC)    Esophageal reflux    Hypertension    Hypokalemia    Insomnia    Urinary incontinence    Tardive dyskinesia    Dysphagia       Past Medical History  Past Medical History:   Diagnosis Date    Acid reflux     Anxiety     RESOLVED: [id number]    Arthritis     Bipolar 2 disorder (HCC)     FOLLOWS WITH PSYCHIATRIST  CONTINUE LAMOTRIGINE; RESOLVED: [id number]    Depression     Familial tremor     both hands    Fibromyalgia     LAST ASSESSED: [id number]    Hearing aid worn     left ear    [place] (hard of hearing)     left ear    Hypertension     Left-sided weakness     Lower back pain     Memory loss of unknown cause     long and short term    Migraine     Overactive bladder     Panic attack     Post traumatic stress disorder     Seasonal allergies     Stroke [facility])     questionable stroke 2009    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    CONT ASA 81 MG DAILY; RESOLVED: [id number]    Urinary incontinence     Wears dentures     partial lower / full upper    Wears glasses         Past Surgical History  Past Surgical History:   Procedure Laterality Date    BACK SURGERY       SECTION      COLONOSCOPY      RESOLVED: [id number]    EAR SURGERY      HYSTERECTOMY  2004    MYRINGOTOMY W/ TUBES Left     NECK SURGERY  2019    NH CYSTOURETHROSCOPY N/A 2016    Procedure: CYSTOSCOPY, BOTOX INJECTION;  Surgeon: Thuy Zambrano MD;  Location: [facility];  Service: Gynecology    NH PERCUT IMPLNT Eloisa Long Right 2020    Procedure: INSERTION THORACIC DORSAL COLUMN SPINAL CORD STIMULATOR PERCUTANEOUS W IMPLANTABLE PULSE GENERATOR, RIGHT; Surgeon: Daija Blas MD;  Location:  MAIN OR;  Service: Neurosurgery    TONSILLECTOMY      TUBAL LIGATION  1986         Subjective:  Pt alert  States he speech doesn't sound right  Noted tongue is very large  H/o tardive dyskinesia  (bipolar d/o)  Reviewed evaling slp's report  Objective:  FINDINGS: EGD 9/7  · Tortuous esophagus  There appeared to be a prominent bulge in the upper esophagus that was likely related to extrinsic pressure from the aortic knob  · Moderate erythematous mucosa in the body of the stomach and antrum; performed cold biopsy biopsied to rule out H  Pylori  · The duodenum appeared normal   ·   Pt ordered 3 sherberts, gingerale, and a steak and cheese hoagie  Sauce on the side  Pt cued to chew food very well  Educated that meat and bread will likely be the hardest items to get down  She reported sticking in her chest x 2  Needed cues to take drinks in between  Educated on softer items w/ added moisture  Mastication was adequate today  She does not eat raw fruits and veggies  She is also reporting that at night sometimes liquid comes up and out of her nose  She sleeps flat  Advised to sleep w/ her back elevated to ~ 30 degrees, eat smaller meals  She states she would like to get the sleeve  Assessment:  tolerating current diet though has some esophageal s/s  Needs to alternate w/ liquids  Edentulous  Avoids raw fruits and veggies  Reports weight gain  Plan/Recommendations:  D/c ST  Regular diet  reflux   Precautions  Avoid foods that are very hard to chew  Avoid dry/dense foods  Alternate w/ liquids  Elevate HOB

## 2020-09-09 NOTE — PHYSICAL THERAPY NOTE
Physical Therapy Progress Note     09/09/20 1132   Pain Assessment   Pain Assessment Tool 0-10   Pain Score 7   Pain Location/Orientation Location: Knee;Orientation: Bilateral   Hospital Pain Intervention(s) Ambulation/increased activity;Repositioned   Restrictions/Precautions   Weight Bearing Precautions Per Order No   Other Precautions Fall Risk;Cognitive; Chair Alarm; Bed Alarm;Pain;Multiple lines   General   Chart Reviewed Yes   Response to Previous Treatment Patient reporting fatigue but able to participate  Family/Caregiver Present No   Subjective   Subjective Willing to participate in therapy this AM    Bed Mobility   Supine to Sit 4  Minimal assistance   Additional items Assist x 1;Bedrails;HOB elevated;Leg ; Increased time required;Verbal cues;LE management   Sit to Supine 4  Minimal assistance   Additional items Assist x 1;Bedrails;Leg ; Increased time required;Verbal cues;LE management   Transfers   Sit to Stand 3  Moderate assistance   Additional items Assist x 1;Bedrails; Increased time required;Verbal cues   Stand to Sit 3  Moderate assistance   Additional items Assist x 1;Bedrails; Increased time required;Verbal cues   Ambulation/Elevation   Gait pattern Decreased foot clearance; Forward Flexion; Short stride; Step to;Excessively slow; Inconsistent zack; Improper Weight shift; Poor UE support   Gait Assistance 3  Moderate assist   Additional items Assist x 1; Tactile cues; Verbal cues   Assistive Device Other (Comment)  (rollator)   Distance 5'   Balance   Static Sitting Fair +   Dynamic Sitting Fair   Static Standing Fair -   Dynamic Standing Poor +   Ambulatory Poor   Endurance Deficit   Endurance Deficit Yes   Endurance Deficit Description fatigue/weakness/pain   Activity Tolerance   Activity Tolerance Patient limited by fatigue;Patient limited by pain   Nurse Made Aware Yes   Exercises   THR Supine;10 reps;AAROM; Bilateral   Assessment   Prognosis Fair   Problem List Decreased strength;Decreased range of motion;Decreased endurance; Impaired balance;Decreased mobility; Decreased cognition; Impaired judgement;Decreased safety awareness; Obesity; Decreased skin integrity;Pain;Orthopedic restrictions   Assessment Pt  supine in bed upon my arrival  Pt  reporting increased fatigue/pain, however agreeable to therapeutic intervention  Performance of HEP supine in bed with cues provided for proper completion  Progressed with transfers requiring A of therapist with cues for hand placement/technique  Progressed with a limited amb  trial with use of rollator and A of therapist with cues provided for LE sequencing  Pt  limited by fatigue/pain, requiring quick return to seated at EOB  Pt  repositioned supine in bed at end of treatment session with bed alarm active  Pt will continue to recommend d/c to rehab when medically stable for continued improvement of noted impairments above  Barriers to Discharge Inaccessible home environment;Decreased caregiver support   Barriers to Discharge Comments SKY, level of support at home  Goals   Patient Goals To rest    STG Expiration Date 09/18/20   PT Treatment Day 3   Plan   Treatment/Interventions LE strengthening/ROM; Functional transfer training; Therapeutic exercise; Endurance training;Bed mobility;Gait training;Spoke to nursing;Spoke to case management   Progress Slow progress, decreased activity tolerance   PT Frequency Other (Comment)  (3-5x/wk)   Recommendation   PT Discharge Recommendation Post-Acute Rehabilitation Services   Equipment Recommended Walker  (RW)   PT - OK to Discharge Yes  (if d/c to rehab when medically stable )     Jonathan Mcgraw, PTA

## 2020-09-09 NOTE — NURSING NOTE
Attempted to call report to [facility] twice at the number: [address] with no answer  Pt awaiting transport to facility at 1700

## 2020-09-09 NOTE — ASSESSMENT & PLAN NOTE
Seen by ortho  Thought to be due to arthritis and nothing to do acutely    She is getting an elective knee replacement in October    We are sending her to short term rehab

## 2020-09-09 NOTE — PLAN OF CARE
Problem: Potential for Falls  Goal: Patient will remain free of falls  Description: INTERVENTIONS:  - Assess patient frequently for physical needs  -  Identify cognitive and physical deficits and behaviors that affect risk of falls  -  [place] fall precautions as indicated by assessment   - Educate patient/family on patient safety including physical limitations  - Instruct patient to call for assistance with activity based on assessment  - Modify environment to reduce risk of injury  - Consider OT/PT consult to assist with strengthening/mobility  Outcome: Progressing     Problem: Nutrition/Hydration-ADULT  Goal: Nutrient/Hydration intake appropriate for improving, restoring or maintaining nutritional needs  Description: Monitor and assess patient's nutrition/hydration status for malnutrition  Collaborate with interdisciplinary team and initiate plan and interventions as ordered  Monitor patient's weight and dietary intake as ordered or per policy  Utilize nutrition screening tool and intervene as necessary  Determine patient's food preferences and provide high-protein, high-caloric foods as appropriate       INTERVENTIONS:  - Monitor oral intake, urinary output, labs, and treatment plans  - Assess nutrition and hydration status and recommend course of action  - Evaluate amount of meals eaten  - Assist patient with eating if necessary   - Allow adequate time for meals  - Recommend/ encourage appropriate diets, oral nutritional supplements, and vitamin/mineral supplements  - Order, calculate, and assess calorie counts as needed  - Recommend, monitor, and adjust tube feedings and TPN/PPN based on assessed needs  - Assess need for intravenous fluids  - Provide specific nutrition/hydration education as appropriate  - Include patient/family/caregiver in decisions related to nutrition  Outcome: Progressing     Problem: Prexisting or High Potential for Compromised Skin Integrity  Goal: Skin integrity is maintained or improved  Description: INTERVENTIONS:  - Identify patients at risk for skin breakdown  - Assess and monitor skin integrity  - Assess and monitor nutrition and hydration status  - Monitor labs   - Assess for incontinence   - Turn and reposition patient  - Assist with mobility/ambulation  - Relieve pressure over bony prominences  - Avoid friction and shearing  - Provide appropriate hygiene as needed including keeping skin clean and dry  - Evaluate need for skin moisturizer/barrier cream  - Collaborate with interdisciplinary team   - Patient/family teaching  - Consider wound care consult   Outcome: Progressing     Problem: PAIN - ADULT  Goal: Verbalizes/displays adequate comfort level or baseline comfort level  Description: Interventions:  - Encourage patient to monitor pain and request assistance  - Assess pain using appropriate pain scale  - Administer analgesics based on type and severity of pain and evaluate response  - Implement non-pharmacological measures as appropriate and evaluate response  - Consider cultural and social influences on pain and pain management  - Notify physician/advanced practitioner if interventions unsuccessful or patient reports new pain  Outcome: Progressing     Problem: INFECTION - ADULT  Goal: Absence or prevention of progression during hospitalization  Description: INTERVENTIONS:  - Assess and monitor for signs and symptoms of infection  - Monitor lab/diagnostic results  - Monitor all insertion sites, i e  indwelling lines, tubes, and drains  - Monitor endotracheal if appropriate and nasal secretions for changes in amount and color  - [place] appropriate cooling/warming therapies per order  - Administer medications as ordered  - Instruct and encourage patient and family to use good hand hygiene technique  - Identify and instruct in appropriate isolation precautions for identified infection/condition  Outcome: Progressing  Goal: Absence of fever/infection during neutropenic period  Description: INTERVENTIONS:  - Monitor WBC    Outcome: Progressing     Problem: SAFETY ADULT  Goal: Patient will remain free of falls  Description: INTERVENTIONS:  - Assess patient frequently for physical needs  -  Identify cognitive and physical deficits and behaviors that affect risk of falls    -  [place] fall precautions as indicated by assessment   - Educate patient/family on patient safety including physical limitations  - Instruct patient to call for assistance with activity based on assessment  - Modify environment to reduce risk of injury  - Consider OT/PT consult to assist with strengthening/mobility  Outcome: Progressing  Goal: Maintain or return to baseline ADL function  Description: INTERVENTIONS:  -  Assess patient's ability to carry out ADLs; assess patient's baseline for ADL function and identify physical deficits which impact ability to perform ADLs (bathing, care of mouth/teeth, toileting, grooming, dressing, etc )  - Assess/evaluate cause of self-care deficits   - Assess range of motion  - Assess patient's mobility; develop plan if impaired  - Assess patient's need for assistive devices and provide as appropriate  - Encourage maximum independence but intervene and supervise when necessary  - Involve family in performance of ADLs  - Assess for home care needs following discharge   - Consider OT consult to assist with ADL evaluation and planning for discharge  - Provide patient education as appropriate  Outcome: Progressing  Goal: Maintain or return mobility status to optimal level  Description: INTERVENTIONS:  - Assess patient's baseline mobility status (ambulation, transfers, stairs, etc )    - Identify cognitive and physical deficits and behaviors that affect mobility  - Identify mobility aids required to assist with transfers and/or ambulation (gait belt, sit-to-stand, lift, walker, cane, etc )  - [place] fall precautions as indicated by assessment  - Record patient progress and toleration of activity level on Mobility SBAR; progress patient to next Phase/Stage  - Instruct patient to call for assistance with activity based on assessment  - Consider rehabilitation consult to assist with strengthening/weightbearing, etc   Outcome: Progressing     Problem: DISCHARGE PLANNING  Goal: Discharge to home or other facility with appropriate resources  Description: INTERVENTIONS:  - Identify barriers to discharge w/patient and caregiver  - Arrange for needed discharge resources and transportation as appropriate  - Identify discharge learning needs (meds, wound care, etc )  - Arrange for interpretive services to assist at discharge as needed  - Refer to Case Management Department for coordinating discharge planning if the patient needs post-hospital services based on physician/advanced practitioner order or complex needs related to functional status, cognitive ability, or social support system  Outcome: Progressing     Problem: Knowledge Deficit  Goal: Patient/family/caregiver demonstrates understanding of disease process, treatment plan, medications, and discharge instructions  Description: Complete learning assessment and assess knowledge base    Interventions:  - Provide teaching at level of understanding  - Provide teaching via preferred learning methods  Outcome: Progressing     Problem: NEUROSENSORY - ADULT  Goal: Achieves stable or improved neurological status  Description: INTERVENTIONS  - Monitor and report changes in neurological status  - Monitor vital signs such as temperature, blood pressure, glucose, and any other labs ordered   - Initiate measures to prevent increased intracranial pressure  - Monitor for seizure activity and implement precautions if appropriate      Outcome: Progressing  Goal: Remains free of injury related to seizures activity  Description: INTERVENTIONS  - Maintain airway, patient safety  and administer oxygen as ordered  - Monitor patient for seizure activity, document and report duration and description of seizure to physician/advanced practitioner  - If seizure occurs,  ensure patient safety during seizure  - Reorient patient post seizure  - Seizure pads on all 4 side rails  - Instruct patient/family to notify RN of any seizure activity including if an aura is experienced  - Instruct patient/family to call for assistance with activity based on nursing assessment  - Administer anti-seizure medications if ordered    Outcome: Progressing  Goal: Achieves maximal functionality and self care  Description: INTERVENTIONS  - Monitor swallowing and airway patency with patient fatigue and changes in neurological status  - Encourage and assist patient to increase activity and self care     - Encourage visually impaired, hearing impaired and aphasic patients to use assistive/communication devices  Outcome: Progressing     Problem: CARDIOVASCULAR - ADULT  Goal: Maintains optimal cardiac output and hemodynamic stability  Description: INTERVENTIONS:  - Monitor I/O, vital signs and rhythm  - Monitor for S/S and trends of decreased cardiac output  - Administer and titrate ordered vasoactive medications to optimize hemodynamic stability  - Assess quality of pulses, skin color and temperature  - Assess for signs of decreased coronary artery perfusion  - Instruct patient to report change in severity of symptoms  Outcome: Progressing  Goal: Absence of cardiac dysrhythmias or at baseline rhythm  Description: INTERVENTIONS:  - Continuous cardiac monitoring, vital signs, obtain 12 lead EKG if ordered  - Administer antiarrhythmic and heart rate control medications as ordered  - Monitor electrolytes and administer replacement therapy as ordered  Outcome: Progressing     Problem: RESPIRATORY - ADULT  Goal: Achieves optimal ventilation and oxygenation  Description: INTERVENTIONS:  - Assess for changes in respiratory status  - Assess for changes in mentation and behavior  - Position to facilitate oxygenation and minimize respiratory effort  - Oxygen administered by appropriate delivery if ordered  - Initiate smoking cessation education as indicated  - Encourage broncho-pulmonary hygiene including cough, deep breathe, Incentive Spirometry  - Assess the need for suctioning and aspirate as needed  - Assess and instruct to report SOB or any respiratory difficulty  - Respiratory Therapy support as indicated  Outcome: Progressing     Problem: GASTROINTESTINAL - ADULT  Goal: Minimal or absence of nausea and/or vomiting  Description: INTERVENTIONS:  - Administer IV fluids if ordered to ensure adequate hydration  - Maintain NPO status until nausea and vomiting are resolved  - Nasogastric tube if ordered  - Administer ordered antiemetic medications as needed  - Provide nonpharmacologic comfort measures as appropriate  - Advance diet as tolerated, if ordered  - Consider nutrition services referral to assist patient with adequate nutrition and appropriate food choices  Outcome: Progressing  Goal: Maintains or returns to baseline bowel function  Description: INTERVENTIONS:  - Assess bowel function  - Encourage oral fluids to ensure adequate hydration  - Administer IV fluids if ordered to ensure adequate hydration  - Administer ordered medications as needed  - Encourage mobilization and activity  - Consider nutritional services referral to assist patient with adequate nutrition and appropriate food choices  Outcome: Progressing  Goal: Maintains adequate nutritional intake  Description: INTERVENTIONS:  - Monitor percentage of each meal consumed  - Identify factors contributing to decreased intake, treat as appropriate  - Assist with meals as needed  - Monitor I&O, weight, and lab values if indicated  - Obtain nutrition services referral as needed  Outcome: Progressing     Problem: GENITOURINARY - ADULT  Goal: Maintains or returns to baseline urinary function  Description: INTERVENTIONS:  - Assess urinary function  - Encourage oral fluids to ensure adequate hydration if ordered  - Administer IV fluids as ordered to ensure adequate hydration  - Administer ordered medications as needed  - Offer frequent toileting  - Follow urinary retention protocol if ordered  Outcome: Progressing  Goal: Absence of urinary retention  Description: INTERVENTIONS:  - Assess patients ability to void and empty bladder  - Monitor I/O  - Bladder scan as needed  - Discuss with physician/AP medications to alleviate retention as needed  - Discuss catheterization for long term situations as appropriate  Outcome: Progressing     Problem: METABOLIC, FLUID AND ELECTROLYTES - ADULT  Goal: Electrolytes maintained within normal limits  Description: INTERVENTIONS:  - Monitor labs and assess patient for signs and symptoms of electrolyte imbalances  - Administer electrolyte replacement as ordered  - Monitor response to electrolyte replacements, including repeat lab results as appropriate  - Instruct patient on fluid and nutrition as appropriate  Outcome: Progressing  Goal: Fluid balance maintained  Description: INTERVENTIONS:  - Monitor labs   - Monitor I/O and WT  - Instruct patient on fluid and nutrition as appropriate  - Assess for signs & symptoms of volume excess or deficit  Outcome: Progressing  Goal: Glucose maintained within target range  Description: INTERVENTIONS:  - Monitor Blood Glucose as ordered  - Assess for signs and symptoms of hyperglycemia and hypoglycemia  - Administer ordered medications to maintain glucose within target range  - Assess nutritional intake and initiate nutrition service referral as needed  Outcome: Progressing     Problem: SKIN/TISSUE INTEGRITY - ADULT  Goal: Skin integrity remains intact  Description: INTERVENTIONS  - Identify patients at risk for skin breakdown  - Assess and monitor skin integrity  - Assess and monitor nutrition and hydration status  - Monitor labs (i e  albumin)  - Assess for incontinence   - Turn and reposition patient  - Assist with mobility/ambulation  - Relieve pressure over bony prominences  - Avoid friction and shearing  - Provide appropriate hygiene as needed including keeping skin clean and dry  - Evaluate need for skin moisturizer/barrier cream  - Collaborate with interdisciplinary team (i e  Nutrition, Rehabilitation, etc )   - Patient/family teaching  Outcome: Progressing  Goal: Oral mucous membranes remain intact  Description: INTERVENTIONS  - Assess oral mucosa and hygiene practices  - Implement preventative oral hygiene regimen  - Implement oral medicated treatments as ordered  - Initiate Nutrition services referral as needed  Outcome: Progressing     Problem: HEMATOLOGIC - ADULT  Goal: Maintains hematologic stability  Description: INTERVENTIONS  - Assess for signs and symptoms of bleeding or hemorrhage  - Monitor labs  - Administer supportive blood products/factors as ordered and appropriate  Outcome: Progressing     Problem: MUSCULOSKELETAL - ADULT  Goal: Maintain or return mobility to safest level of function  Description: INTERVENTIONS:  - Assess patient's ability to carry out ADLs; assess patient's baseline for ADL function and identify physical deficits which impact ability to perform ADLs (bathing, care of mouth/teeth, toileting, grooming, dressing, etc )  - Assess/evaluate cause of self-care deficits   - Assess range of motion  - Assess patient's mobility  - Assess patient's need for assistive devices and provide as appropriate  - Encourage maximum independence but intervene and supervise when necessary  - Involve family in performance of ADLs  - Assess for home care needs following discharge   - Consider OT consult to assist with ADL evaluation and planning for discharge  - Provide patient education as appropriate  Outcome: Progressing  Goal: Maintain proper alignment of affected body part  Description: INTERVENTIONS:  - Support, maintain and protect limb and body alignment  - Provide patient/ family with appropriate education  Outcome: Progressing

## 2020-09-09 NOTE — DISCHARGE SUMMARY
Discharge- Samantha Mina Asya 1965, 62 y o  female MRN: [id number]    Unit/Bed#: E5 -01 Encounter: [id number]    Primary Care Provider: Peg Quan MD   Date and time admitted to hospital: 9/4/2020 10:06 AM        Right knee pain  Assessment & Plan  Seen by ortho  Thought to be due to arthritis and nothing to do acutely    She is getting an elective knee replacement in October    We are sending her to short term rehab    Dysphagia  Assessment & Plan  Had EGD done  Recommend PPI and outpatient pH and manometry testing  I put in a referral for GI follow up    Tardive dyskinesia  Assessment & Plan  -patient has a history of tardive dyskinesia  -continue home medication with Valbenazine Tosylate CAPS 80 mg if she can bring It in from home        Discharging Physician / Practitioner: Leny Wlaters DO  PCP: Peg Quan MD  Admission Date:   Admission Orders (From admission, onward)     Ordered        09/05/20 1252  Inpatient Admission  Once         09/04/20 1630  Place in Observation (expected length of stay for this patient is less than two midnights)  Once                   Discharge Date: 09/09/20    Resolved Problems  Date Reviewed: 9/6/2020    None            Consultations During Hospital Stay:  · GI  · Ortho      Procedures Performed:     · EGD      Reason for Admission: right knee pain      Hospital Course:     Samantha Romo is a 62 y o  female patient who originally presented to the hospital on 9/4/2020 due to right knee pain  She already has right knee arthritis and is scheduled for a right knee replacement in October  She fell on her right knee and now cannot walk  She has no broken bones  She was seen by ortho and they did not recommend a steroid injection because she has a knee replacement next week  Patient is having difficulty walking and will be going to a skilled nursing facility      Patient also complains of chronic dysphagia     She had an EGD which showed some abnormalities of pressure  GI recommends outpatient pH and manometry testing  She is able to eat and drink  Please see above list of diagnoses and related plan for additional information  Condition at Discharge: fair       Discharge Day Visit / Exam:     Subjective:  Feels well  Eating and drinking  Knee pain is controlled  Vitals: Blood Pressure: 119/79 (09/09/20 0804)  Pulse: 68 (09/09/20 0804)  Temperature: 97 7 °F (36 5 °C) (09/09/20 0804)  Temp Source: Temporal (09/09/20 0804)  Respirations: 18 (09/09/20 0804)  Height: 5' 1" (154 9 cm) (09/04/20 2126)  Weight - Scale: 97 5 kg (214 lb 15 2 oz) (09/04/20 2126)  SpO2: 95 % (09/09/20 0804)    Exam:     Physical Exam  Vitals signs and nursing note reviewed  HENT:      Head: Normocephalic and atraumatic  Eyes:      Pupils: Pupils are equal, round, and reactive to light  Cardiovascular:      Rate and Rhythm: Normal rate and regular rhythm  Heart sounds: No murmur  No friction rub  No gallop  Pulmonary:      Effort: Pulmonary effort is normal       Breath sounds: Normal breath sounds  No wheezing or rales  Abdominal:      General: Bowel sounds are normal       Palpations: Abdomen is soft  Tenderness: There is no abdominal tenderness  Karrie Nissen Discharge instructions/Information to patient and family:   See after visit summary for information provided to patient and family  Provisions for Follow-Up Care:  See after visit summary for information related to follow-up care and any pertinent home health orders  Disposition:     Other: short term rehab       Discharge Statement:  I spent 38 minutes discharging the patient  This time was spent on the day of discharge  I had direct contact with the patient on the day of discharge   Greater than 50% of the total time was spent examining patient, answering all patient questions, arranging and discussing plan of care with patient as well as directly providing post-discharge instructions  Additional time then spent on discharge activities  Discharge Medications:  See after visit summary for reconciled discharge medications provided to patient and family        ** Please Note: This note has been constructed using a voice recognition system **

## 2020-09-09 NOTE — PROGRESS NOTES
Patient is in need of short-term rehab and it is anticipated she will require less than 30 calendar days of nursing facility services and her symptoms and behaviors are stable

## 2020-09-09 NOTE — ASSESSMENT & PLAN NOTE
-patient has a history of tardive dyskinesia  -continue home medication with Valbenazine Tosylate CAPS 80 mg if she can bring It in from home

## 2020-09-09 NOTE — PLAN OF CARE
Problem: PHYSICAL THERAPY ADULT  Goal: Performs mobility at highest level of function for planned discharge setting  See evaluation for individualized goals  Description: Treatment/Interventions: Functional transfer training, LE strengthening/ROM, Elevations, Therapeutic exercise, Endurance training, Cognitive reorientation, Patient/family training, Equipment eval/education, Bed mobility, Gait training, Compensatory technique education, Continued evaluation, Spoke to nursing, Spoke to case management, Spoke to advanced practitioner  Equipment Recommended: Adrianna Innocent       See flowsheet documentation for full assessment, interventions and recommendations  Outcome: Progressing  Note: Prognosis: Fair  Problem List: Decreased strength, Decreased range of motion, Decreased endurance, Impaired balance, Decreased mobility, Decreased cognition, Impaired judgement, Decreased safety awareness, Obesity, Decreased skin integrity, Pain, Orthopedic restrictions  Assessment: Pt  supine in bed upon my arrival  Pt  reporting increased fatigue/pain, however agreeable to therapeutic intervention  Performance of HEP supine in bed with cues provided for proper completion  Progressed with transfers requiring A of therapist with cues for hand placement/technique  Progressed with a limited amb  trial with use of rollator and A of therapist with cues provided for LE sequencing  Pt  limited by fatigue/pain, requiring quick return to seated at EOB  Pt  repositioned supine in bed at end of treatment session with bed alarm active  Pt will continue to recommend d/c to rehab when medically stable for continued improvement of noted impairments above  Barriers to Discharge: Inaccessible home environment, Decreased caregiver support  Barriers to Discharge Comments: SKY, level of support at home    PT Discharge Recommendation: Post-Acute Rehabilitation Services     PT - OK to Discharge: Yes(if d/c to rehab when medically stable )    See flowsheet documentation for full assessment

## 2020-09-09 NOTE — PROGRESS NOTES
Progress Note - Samantha Howe 62 y o  female MRN: [id number]    Unit/Bed#: E5 -01 Encounter: [id number]         Assessment/ Plan:  Dysphagia    Pt was admitted with worsening dysphagia  She underwent EGD yesterday showing a tortuous esophagus with a prominent bulge that was likely extrinsic compression from the aortic knob, moderate erythematous mucosa in the stomach  She states she was able to tolerate eggs today    -Continue with diet as tolerated  -chew food well  -sit upright while eating  -outpt pH & manometry      Subjective:   Pt seen & examined  Tolerating a soft diet  Objective:     Vitals: Blood pressure 119/79, pulse 68, temperature 97 7 °F (36 5 °C), temperature source Temporal, resp  rate 18, height 5' 1" (1 549 m), weight 97 5 kg (214 lb 15 2 oz), SpO2 95 %, not currently breastfeeding  ,Body mass index is 40 61 kg/m²          Physical Exam: General appearance: alert and oriented, in no acute distress  Lungs: clear to auscultation bilaterally  Heart: regular rate and rhythm  Abdomen: soft, non-tender; bowel sounds normal; no masses,  no organomegaly  Skin: Skin color, texture, turgor normal  No rashes or lesions     Invasive Devices     Peripheral Intravenous Line            Peripheral IV 09/08/20 Right Hand less than 1 day

## 2020-09-09 NOTE — ASSESSMENT & PLAN NOTE
Had EGD done    Recommend PPI and outpatient pH and manometry testing  I put in a referral for GI follow up

## 2020-09-10 ENCOUNTER — PATIENT OUTREACH (OUTPATIENT)
Dept: INTERNAL MEDICINE CLINIC | Facility: CLINIC | Age: 55
End: 2020-09-10

## 2020-09-10 ENCOUNTER — TRANSITIONAL CARE MANAGEMENT (OUTPATIENT)
Dept: INTERNAL MEDICINE CLINIC | Facility: CLINIC | Age: 55
End: 2020-09-10

## 2020-09-10 PROCEDURE — TCMNV: Performed by: INTERNAL MEDICINE

## 2020-09-10 NOTE — PROGRESS NOTES
Outpatient Care Management Note:    Patient was inpatient at [facility] from 9/4-9/9/2020 for ambulatory dysfunction  Patient was [place] [facility]  Patient was referred to GI for outpatient pH and manometry testing  Patient had EGD done on 9/7/2020  Patient is scheduled for knee replacement on 10/7/2020  Will follow patient in [address] [place] [address],[facility] Floor and follow up with her when she is discharged  Huddled today with  Windy Valentin and community health worker, Anita López and CHW will reach out to the SNF to make  there aware patient is in need of [place] in person evaluation for [employer]

## 2020-09-15 ENCOUNTER — TELEPHONE (OUTPATIENT)
Dept: OBGYN CLINIC | Facility: MEDICAL CENTER | Age: 55
End: 2020-09-15

## 2020-09-15 ENCOUNTER — PATIENT OUTREACH (OUTPATIENT)
Dept: INTERNAL MEDICINE CLINIC | Facility: CLINIC | Age: 55
End: 2020-09-15

## 2020-09-15 NOTE — PROGRESS NOTES
CHW called the patients ICM to speak to her about the patients [place] [facility] evaluation  At this time there was no answer  CHW left a message asking for a return telephone call  CHW will complete all things concerning the patients rides when the call is returned and [facility] can see if its possible for her to go to [place]

## 2020-09-15 NOTE — TELEPHONE ENCOUNTER
Left message on Daughter's vm Tunde  Cancelled surgery and all appts due to her recent hospitalization  She was unable to keep appointments and is not optimized for surgery for Rt TKA at this time  Asked for her to call to reschedule when she is

## 2020-09-16 ENCOUNTER — PATIENT OUTREACH (OUTPATIENT)
Dept: INTERNAL MEDICINE CLINIC | Facility: CLINIC | Age: 55
End: 2020-09-16

## 2020-09-16 NOTE — PROGRESS NOTES
CHW called [facility] to speak to the  who was not available at this time  CHW left a message on her answering service asking her to return a call to the CHW to see how we could come together and coordinate a time that would be good to set the patients face to face evaluation up with Arpit Fernando  CHW will wait for the return call to see which is the best way to do this  CHW will remain available until all things concerning the patients rides have been satisfied

## 2020-09-17 ENCOUNTER — HOSPITAL ENCOUNTER (EMERGENCY)
Facility: HOSPITAL | Age: 55
Discharge: HOME | End: 2020-09-17
Attending: EMERGENCY MEDICINE | Admitting: EMERGENCY MEDICINE
Payer: COMMERCIAL

## 2020-09-17 ENCOUNTER — OFFICE VISIT (OUTPATIENT)
Dept: NEUROSURGERY | Facility: CLINIC | Age: 55
End: 2020-09-17

## 2020-09-17 ENCOUNTER — APPOINTMENT (EMERGENCY)
Dept: RADIOLOGY | Facility: HOSPITAL | Age: 55
End: 2020-09-17
Payer: COMMERCIAL

## 2020-09-17 VITALS
OXYGEN SATURATION: 98 % | TEMPERATURE: 98.9 F | SYSTOLIC BLOOD PRESSURE: 123 MMHG | DIASTOLIC BLOOD PRESSURE: 80 MMHG | HEART RATE: 76 BPM | RESPIRATION RATE: 18 BRPM

## 2020-09-17 VITALS
HEART RATE: 82 BPM | BODY MASS INDEX: 40.61 KG/M2 | HEIGHT: 61 IN | TEMPERATURE: 98.7 F | DIASTOLIC BLOOD PRESSURE: 74 MMHG | SYSTOLIC BLOOD PRESSURE: 118 MMHG | RESPIRATION RATE: 16 BRPM

## 2020-09-17 DIAGNOSIS — G89.4 CHRONIC PAIN SYNDROME: Primary | ICD-10-CM

## 2020-09-17 DIAGNOSIS — R11.10 VOMITING: Primary | ICD-10-CM

## 2020-09-17 DIAGNOSIS — R10.9 ABDOMINAL PAIN: ICD-10-CM

## 2020-09-17 DIAGNOSIS — K59.03 CONSTIPATION DUE TO PAIN MEDICATION: ICD-10-CM

## 2020-09-17 LAB
ALBUMIN SERPL BCP-MCNC: 3.6 G/DL (ref 3.5–5)
ALP SERPL-CCNC: 145 U/L (ref 46–116)
ALT SERPL W P-5'-P-CCNC: 36 U/L (ref 12–78)
ANION GAP SERPL CALCULATED.3IONS-SCNC: 6 MMOL/L (ref 4–13)
AST SERPL W P-5'-P-CCNC: 18 U/L (ref 5–45)
BASOPHILS # BLD AUTO: 0.02 THOUSANDS/ΜL (ref 0–0.1)
BASOPHILS NFR BLD AUTO: 1 % (ref 0–1)
BILIRUB SERPL-MCNC: 0.68 MG/DL (ref 0.2–1)
BUN SERPL-MCNC: 8 MG/DL (ref 5–25)
CALCIUM SERPL-MCNC: 9.3 MG/DL (ref 8.3–10.1)
CHLORIDE SERPL-SCNC: 106 MMOL/L (ref 100–108)
CO2 SERPL-SCNC: 31 MMOL/L (ref 21–32)
CREAT SERPL-MCNC: 0.73 MG/DL (ref 0.6–1.3)
EOSINOPHIL # BLD AUTO: 0.13 THOUSAND/ΜL (ref 0–0.61)
EOSINOPHIL NFR BLD AUTO: 4 % (ref 0–6)
ERYTHROCYTE [DISTWIDTH] IN BLOOD BY AUTOMATED COUNT: 14 % (ref 11.6–15.1)
GFR SERPL CREATININE-BSD FRML MDRD: 106 ML/MIN/1.73SQ M
GLUCOSE SERPL-MCNC: 103 MG/DL (ref 65–140)
HCT VFR BLD AUTO: 44.7 % (ref 34.8–46.1)
HGB BLD-MCNC: 14.6 G/DL (ref 11.5–15.4)
IMM GRANULOCYTES # BLD AUTO: 0.01 THOUSAND/UL (ref 0–0.2)
IMM GRANULOCYTES NFR BLD AUTO: 0 % (ref 0–2)
LIPASE SERPL-CCNC: 34 U/L (ref 73–393)
LYMPHOCYTES # BLD AUTO: 0.82 THOUSANDS/ΜL (ref 0.6–4.47)
LYMPHOCYTES NFR BLD AUTO: 22 % (ref 14–44)
MCH RBC QN AUTO: 28.6 PG (ref 26.8–34.3)
MCHC RBC AUTO-ENTMCNC: 32.7 G/DL (ref 31.4–37.4)
MCV RBC AUTO: 88 FL (ref 82–98)
MONOCYTES # BLD AUTO: 0.26 THOUSAND/ΜL (ref 0.17–1.22)
MONOCYTES NFR BLD AUTO: 7 % (ref 4–12)
NEUTROPHILS # BLD AUTO: 2.48 THOUSANDS/ΜL (ref 1.85–7.62)
NEUTS SEG NFR BLD AUTO: 66 % (ref 43–75)
NRBC BLD AUTO-RTO: 0 /100 WBCS
PLATELET # BLD AUTO: 329 THOUSANDS/UL (ref 149–390)
PMV BLD AUTO: 9.5 FL (ref 8.9–12.7)
POTASSIUM SERPL-SCNC: 4 MMOL/L (ref 3.5–5.3)
PROT SERPL-MCNC: 7.9 G/DL (ref 6.4–8.2)
RBC # BLD AUTO: 5.11 MILLION/UL (ref 3.81–5.12)
SODIUM SERPL-SCNC: 143 MMOL/L (ref 136–145)
WBC # BLD AUTO: 3.72 THOUSAND/UL (ref 4.31–10.16)

## 2020-09-17 PROCEDURE — 99284 EMERGENCY DEPT VISIT MOD MDM: CPT

## 2020-09-17 PROCEDURE — 74177 CT ABD & PELVIS W/CONTRAST: CPT

## 2020-09-17 PROCEDURE — 36415 COLL VENOUS BLD VENIPUNCTURE: CPT | Performed by: EMERGENCY MEDICINE

## 2020-09-17 PROCEDURE — 99024 POSTOP FOLLOW-UP VISIT: CPT | Performed by: NEUROLOGICAL SURGERY

## 2020-09-17 PROCEDURE — 96375 TX/PRO/DX INJ NEW DRUG ADDON: CPT

## 2020-09-17 PROCEDURE — 80053 COMPREHEN METABOLIC PANEL: CPT | Performed by: EMERGENCY MEDICINE

## 2020-09-17 PROCEDURE — 96372 THER/PROPH/DIAG INJ SC/IM: CPT

## 2020-09-17 PROCEDURE — 96374 THER/PROPH/DIAG INJ IV PUSH: CPT

## 2020-09-17 PROCEDURE — G1004 CDSM NDSC: HCPCS

## 2020-09-17 PROCEDURE — 85025 COMPLETE CBC W/AUTO DIFF WBC: CPT | Performed by: EMERGENCY MEDICINE

## 2020-09-17 PROCEDURE — 99284 EMERGENCY DEPT VISIT MOD MDM: CPT | Performed by: EMERGENCY MEDICINE

## 2020-09-17 PROCEDURE — 83690 ASSAY OF LIPASE: CPT | Performed by: EMERGENCY MEDICINE

## 2020-09-17 RX ORDER — MAGNESIUM HYDROXIDE/ALUMINUM HYDROXICE/SIMETHICONE 120; 1200; 1200 MG/30ML; MG/30ML; MG/30ML
30 SUSPENSION ORAL ONCE
Status: COMPLETED | OUTPATIENT
Start: 2020-09-17 | End: 2020-09-17

## 2020-09-17 RX ORDER — ONDANSETRON 4 MG/1
4 TABLET, ORALLY DISINTEGRATING ORAL EVERY 6 HOURS PRN
Qty: 20 TABLET | Refills: 0 | Status: ON HOLD | OUTPATIENT
Start: 2020-09-17 | End: 2021-07-14 | Stop reason: CLARIF

## 2020-09-17 RX ORDER — VALBENAZINE 80 MG/1
80 CAPSULE ORAL DAILY
COMMUNITY
Start: 2020-08-05 | End: 2021-03-16 | Stop reason: SDUPTHER

## 2020-09-17 RX ORDER — SODIUM PHOSPHATE, DIBASIC AND SODIUM PHOSPHATE, MONOBASIC 7; 19 G/133ML; G/133ML
1 ENEMA RECTAL ONCE
Status: COMPLETED | OUTPATIENT
Start: 2020-09-17 | End: 2020-09-17

## 2020-09-17 RX ORDER — ONDANSETRON 2 MG/ML
4 INJECTION INTRAMUSCULAR; INTRAVENOUS ONCE
Status: COMPLETED | OUTPATIENT
Start: 2020-09-17 | End: 2020-09-17

## 2020-09-17 RX ORDER — KETOROLAC TROMETHAMINE 30 MG/ML
15 INJECTION, SOLUTION INTRAMUSCULAR; INTRAVENOUS ONCE
Status: COMPLETED | OUTPATIENT
Start: 2020-09-17 | End: 2020-09-17

## 2020-09-17 RX ORDER — LIDOCAINE HYDROCHLORIDE 20 MG/ML
10 SOLUTION OROPHARYNGEAL ONCE
Status: COMPLETED | OUTPATIENT
Start: 2020-09-17 | End: 2020-09-17

## 2020-09-17 RX ADMIN — KETOROLAC TROMETHAMINE 15 MG: 30 INJECTION, SOLUTION INTRAMUSCULAR at 05:22

## 2020-09-17 RX ADMIN — ALUMINUM HYDROXIDE, MAGNESIUM HYDROXIDE, AND SIMETHICONE 30 ML: 200; 200; 20 SUSPENSION ORAL at 05:22

## 2020-09-17 RX ADMIN — SODIUM PHOSPHATE 1 ENEMA: 7; 19 ENEMA RECTAL at 07:17

## 2020-09-17 RX ADMIN — ONDANSETRON 4 MG: 2 INJECTION INTRAMUSCULAR; INTRAVENOUS at 05:22

## 2020-09-17 RX ADMIN — METHYLNALTREXONE BROMIDE 12 MG: 12 INJECTION, SOLUTION SUBCUTANEOUS at 07:23

## 2020-09-17 RX ADMIN — IOHEXOL 100 ML: 350 INJECTION, SOLUTION INTRAVENOUS at 06:20

## 2020-09-17 RX ADMIN — LIDOCAINE HYDROCHLORIDE 10 ML: 20 SOLUTION ORAL; TOPICAL at 05:22

## 2020-09-17 NOTE — DISCHARGE INSTRUCTIONS
Follow-up with your primary care doctor soon as possible  Come back to the emergency department if you have increasing abdominal pain, uncontrolled nausea and vomiting, blood in your vomit

## 2020-09-17 NOTE — PROGRESS NOTES
Assessment/Plan:    No problem-specific Assessment & Plan notes found for this encounter  6 weeks postop no operative issues  Stimulator is providing some relief  She wishes to have orthopedic surgery  She is cleared from my point of view  F/u PRN     Diagnoses and all orders for this visit:    Chronic pain syndrome    Other orders  -     Valbenazine Tosylate (Ingrezza) 80 MG CAPS; Take 80 mg by mouth daily          Subjective:      Patient ID: Ronak Doshi is a 62 y o  female  HPI  6 weeks postop from an SCS implant  No issues  Working with rep for the stimulator  The following portions of the patient's history were reviewed and updated as appropriate: allergies, current medications, past family history, past medical history, past social history, past surgical history and problem list     Review of Systems   Constitutional: Negative  HENT: Negative  Eyes: Negative  Respiratory: Negative  Cardiovascular: Negative  Gastrointestinal: Negative  Endocrine: Negative  Genitourinary: Negative  Musculoskeletal: Positive for back pain (RADIATES TO B/L LEGS  IT HAS IMPROVED SINCE SCS)  Skin: Negative  Allergic/Immunologic: Negative  Neurological: Negative  Hematological: Negative  Psychiatric/Behavioral: Negative  All other systems reviewed and are negative  I have personally reviewed all aspects of the review of systems as documented    Objective:      /74   Pulse 82   Temp 98 7 °F (37 1 °C) (Temporal)   Resp 16   Ht 5' 1" (1 549 m)   BMI 40 61 kg/m²          Physical Exam  Constitutional:       Appearance: Normal appearance  She is normal weight  Eyes:      Extraocular Movements: Extraocular movements intact  Conjunctiva/sclera: Conjunctivae normal       Pupils: Pupils are equal, round, and reactive to light  Neurological:      General: No focal deficit present  Mental Status: She is alert and oriented to person, place, and time   Mental status is at baseline  Psychiatric:         Mood and Affect: Mood normal          Thought Content:  Thought content normal        incisions well healed

## 2020-09-17 NOTE — ED PROVIDER NOTES
History  Chief Complaint   Patient presents with    Vomiting     nursing home reports she has been having trouble passing stool x2 weeks and is now vomiting     49-year-old female with fibromyalgia, hypertension, panic attacks, bipolar, ambulatory dysfunction presenting emergency department with abdominal pain  Patient states that she has had decreased bowel movements for the last week and feels distended  Patient had 1 episode of emesis today  Nonbloody and nonbilious  Patient had one non-bloody small bowel movement this morning  Patient notes that she has chronically been on opiates and is currently on both oxycodone and 15 mg of morphine twice daily secondary to chronic knee and back pain  Patient was recently hospitalized for ambulatory dysfunction  Patient discharged to nursing home  Patient scheduled for total knee arthroplasty next month  Patient is passing gas  No history of bowel obstruction  No history of surgeries  No recent weight loss  No night sweats  Abdominal Pain   Pain location:  LUQ, RUQ and periumbilical  Pain quality: cramping    Pain radiates to:  Does not radiate  Pain severity:  Moderate  Onset quality:  Gradual  Timing:  Intermittent  Progression:  Worsening  Chronicity:  New  Context: not sick contacts    Associated symptoms: constipation, nausea and vomiting    Risk factors: has not had multiple surgeries and no NSAID use    Nausea   The primary symptoms include abdominal pain, nausea and vomiting  The illness is also significant for constipation  Constipation   Severity:  Moderate  Timing:  Intermittent  Progression:  Waxing and waning  Associated symptoms: abdominal pain, nausea and vomiting        Prior to Admission Medications   Prescriptions Last Dose Informant Patient Reported? Taking?    DULoxetine (CYMBALTA) 30 mg delayed release capsule  Self Yes Yes   Sig: Take 30 mg by mouth daily   DULoxetine (CYMBALTA) 60 mg delayed release capsule  Self No No Sig: Take 1 capsule (60 mg total) by mouth daily   LORazepam (ATIVAN) 0 5 mg tablet   No Yes   Sig: Take 1 tablet (0 5 mg total) by mouth every 8 (eight) hours as needed for anxiety for up to 10 days Hold if sedated   Multiple Vitamins-Minerals (multivitamin with minerals) tablet   No Yes   Sig: Take 1 tablet by mouth daily   QUEtiapine (SEROquel XR) 300 mg 24 hr tablet  Self No Yes   Sig: Take 1 tablet (300 mg total) by mouth daily at bedtime   amLODIPine (NORVASC) 5 mg tablet   No No   Sig: Take 1 tablet (5 mg total) by mouth daily   ascorbic acid (VITAMIN C) 500 MG tablet Not Taking at Unknown time  No No   Sig: Take 1 tablet (500 mg total) by mouth 2 (two) times a day   Patient not taking: Reported on 9/4/2020   busPIRone (BUSPAR) 5 mg tablet  Self No Yes   Sig: TAKE 1 TABLET (5 MG TOTAL) BY MOUTH 3 (THREE) TIMES A DAY   cholecalciferol (VITAMIN D3) 1,000 units tablet Not Taking at Unknown time  No No   Sig: Take 1 tablet (1,000 Units total) by mouth daily   Patient not taking: Reported on 8/11/2020   diclofenac sodium (VOLTAREN) 1 %   No Yes   Sig: APPLY 4 G TOPICALLY 4 (FOUR) TIMES A DAY   ferrous sulfate 324 (65 Fe) mg Not Taking at Unknown time  No No   Sig: Take 1 tablet (324 mg total) by mouth 2 (two) times a day before meals   Patient not taking: Reported on 2/5/4782   folic acid (FOLVITE) 1 mg tablet Not Taking at Unknown time  No No   Sig: Take 1 tablet (1 mg total) by mouth daily   Patient not taking: Reported on 9/4/2020   hydrOXYzine HCL (ATARAX) 50 mg tablet  Self No Yes   Sig: Take 1 tablet (50 mg total) by mouth daily at bedtime as needed for anxiety (insomnia)   lidocaine (LMX) 4 % cream  Self No Yes   Sig: Apply topically as needed for mild pain   morphine (MS CONTIN) 15 mg 12 hr tablet   No Yes   Sig: Take 1 tablet (15 mg total) by mouth 2 (two) times a day Hold if sedatedMax Daily Amount: 30 mg   omeprazole (PriLOSEC) 20 mg delayed release capsule   No No   Sig: Take 1 capsule (20 mg total) by mouth daily   oxyCODONE (ROXICODONE) 5 mg immediate release tablet   No Yes   Sig: Take 0 5 tablets (2 5 mg total) by mouth every 4 (four) hours as needed for moderate pain for up to 10 days Hold if sedatedMax Daily Amount: 15 mg   oxybutynin (DITROPAN) 5 mg tablet   No No   Sig: Take 1 tablet (5 mg total) by mouth 2 (two) times a day   prazosin (MINIPRESS) 2 mg capsule  Self No Yes   Sig: TAKE 1 CAPSULE BY MOUTH EVERY DAY   pregabalin (LYRICA) 150 mg capsule  Self No Yes   Sig: Take 1 capsule (150 mg total) by mouth 3 (three) times a day   propranolol (INDERAL) 20 mg tablet   No No   Sig: Take 1 tablet (20 mg total) by mouth 2 (two) times a day   traZODone (DESYREL) 150 mg tablet   No Yes   Sig: Take 1 tablet (150 mg total) by mouth daily at bedtime Hold if sedated   triamcinolone (KENALOG) 0 025 % cream Not Taking at Unknown time Self No No   Sig: Apply topically 2 (two) times a day   Patient not taking: Reported on 2020      Facility-Administered Medications: None       Past Medical History:   Diagnosis Date    Acid reflux     Anxiety     RESOLVED: [id number]    Arthritis     Bipolar 2 disorder (HCC)     FOLLOWS WITH PSYCHIATRIST  CONTINUE LAMOTRIGINE; RESOLVED: [id number]    Depression     Familial tremor     both hands    Fibromyalgia     LAST ASSESSED:     Hearing aid worn     left ear    [place] (hard of hearing)     left ear    Hypertension     Left-sided weakness     Lower back pain     Memory loss of unknown cause     long and short term    Migraine     Overactive bladder     Panic attack     Post traumatic stress disorder     Seasonal allergies     Stroke [facility])     questionable stroke 2009    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    CONT ASA 81 MG DAILY; RESOLVED: [id number]    Urinary incontinence     Wears dentures     partial lower / full upper    Wears glasses        Past Surgical History:   Procedure Laterality Date    BACK SURGERY       SECTION  1986    COLONOSCOPY      RESOLVED: [id number]    EAR SURGERY      EGD      HYSTERECTOMY  2004    MYRINGOTOMY W/ TUBES Left     NECK SURGERY  04/2019    FL CYSTOURETHROSCOPY N/A 2/18/2016    Procedure: CYSTOSCOPY, BOTOX INJECTION;  Surgeon: Paula Payne MD;  Location: [facility];  Service: Gynecology    FL PERCUT IMPLNT Pete Suero Right 7/28/2020    Procedure: INSERTION THORACIC DORSAL COLUMN SPINAL CORD STIMULATOR PERCUTANEOUS W IMPLANTABLE PULSE GENERATOR, RIGHT;  Surgeon: Liss Hunter MD;  Location: [facility];  Service: Neurosurgery    TONSILLECTOMY      TUBAL LIGATION  1986       Family History   Problem Relation Age of Onset    Colon cancer Mother     Alzheimer's disease Father     Stroke Father     Colon cancer Brother     Bipolar disorder Brother     Breast cancer Maternal Aunt     Colon cancer Maternal Aunt     Heart disease Other     Diabetes Other     Hypertension Other     Seizures Son     Depression Paternal Grandfather     No Known Problems Sister     No Known Problems Brother     Thyroid disease Neg Hx      I have reviewed and agree with the history as documented  E-Cigarette/Vaping    E-Cigarette Use Never User      E-Cigarette/Vaping Substances    Nicotine No     THC No     CBD No     Flavoring No     Other No     Unknown No      Social History     Tobacco Use    Smoking status: Never Smoker    Smokeless tobacco: Never Used   Substance Use Topics    Alcohol use: Not Currently     Comment: 2 x year; being a social drinker as per all scripts     Drug use: No        Review of Systems   Gastrointestinal: Positive for abdominal pain, constipation, nausea and vomiting  All other systems reviewed and are negative        Physical Exam  ED Triage Vitals [09/17/20 0503]   Temperature Pulse Respirations Blood Pressure SpO2   98 9 °F (37 2 °C) 84 20 (!) 153/105 98 %      Temp src Heart Rate Source Patient Position - Orthostatic VS BP Location FiO2 (%)   -- -- -- -- --      Pain Score       Worst Possible Pain             Orthostatic Vital Signs  Vitals:    09/17/20 0503 09/17/20 0600 09/17/20 0700   BP: (!) 153/105 120/74 123/80   Pulse: 84 76 76       Physical Exam  Vitals signs and nursing note reviewed  Constitutional:       General: She is not in acute distress  Appearance: She is well-developed  She is not diaphoretic  HENT:      Head: Normocephalic and atraumatic  Right Ear: External ear normal       Left Ear: External ear normal    Eyes:      Conjunctiva/sclera: Conjunctivae normal    Neck:      Vascular: No JVD  Trachea: No tracheal deviation  Cardiovascular:      Rate and Rhythm: Normal rate and regular rhythm  Heart sounds: Normal heart sounds  No murmur  Pulmonary:      Effort: No respiratory distress  Breath sounds: Normal breath sounds  No stridor  No wheezing or rales  Abdominal:      General: Bowel sounds are normal  There is distension  Palpations: Abdomen is soft  There is no mass  Tenderness: There is abdominal tenderness (upper abdomen)  There is no guarding or rebound  Genitourinary:     Comments: Deferred  Musculoskeletal:         General: No tenderness or deformity  Skin:     General: Skin is warm and dry  Capillary Refill: Capillary refill takes less than 2 seconds  Coloration: Skin is not pale  Findings: No erythema or rash  Neurological:      Mental Status: She is alert  Motor: No abnormal muscle tone  Coordination: Coordination normal    Psychiatric:         Behavior: Behavior normal          Thought Content:  Thought content normal          Judgment: Judgment normal          ED Medications  Medications   ondansetron (ZOFRAN) injection 4 mg (4 mg Intravenous Given 9/17/20 0522)   aluminum-magnesium hydroxide-simethicone (MYLANTA) 200-200-20 mg/5 mL oral suspension 30 mL (30 mL Oral Given 9/17/20 0522)   Lidocaine Viscous HCl (XYLOCAINE) 2 % mucosal solution 10 mL (10 mL Swish & Swallow Given 9/17/20 0522)   ketorolac (TORADOL) injection 15 mg (15 mg Intravenous Given 9/17/20 0522)   iohexol (OMNIPAQUE) 350 MG/ML injection (MULTI-DOSE) 100 mL (100 mL Intravenous Given 9/17/20 0620)   methylnaltrexone (RELISTOR) subcutaneous injection 12 mg (12 mg Subcutaneous Given 9/17/20 0723)   sodium phosphate-biphosphate (FLEET) enema 1 enema (1 enema Rectal Given 9/17/20 0717)       Diagnostic Studies  Results Reviewed     Procedure Component Value Units Date/Time    CMP [[id number]]  (Abnormal) Collected:  09/17/20 0514    Lab Status:  Final result Specimen:  Blood from Arm, Left Updated:  09/17/20 0545     Sodium 143 mmol/L      Potassium 4 0 mmol/L      Chloride 106 mmol/L      CO2 31 mmol/L      ANION GAP 6 mmol/L      BUN 8 mg/dL      Creatinine 0 73 mg/dL      Glucose 103 mg/dL      Calcium 9 3 mg/dL      AST 18 U/L      ALT 36 U/L      Alkaline Phosphatase 145 U/L      Total Protein 7 9 g/dL      Albumin 3 6 g/dL      Total Bilirubin 0 68 mg/dL      eGFR 106 ml/min/1 73sq m     Narrative:       [place] guidelines for Chronic Kidney Disease (CKD):     Stage 1 with normal or high GFR (GFR > 90 mL/min/1 73 square meters)    Stage 2 Mild CKD (GFR = 60-89 mL/min/1 73 square meters)    Stage 3A Moderate CKD (GFR = 45-59 mL/min/1 73 square meters)    Stage 3B Moderate CKD (GFR = 30-44 mL/min/1 73 square meters)    Stage 4 Severe CKD (GFR = 15-29 mL/min/1 73 square meters)    Stage 5 End Stage CKD (GFR <15 mL/min/1 73 square meters)  Note: GFR calculation is accurate only with a steady state creatinine    Lipase [[id number]]  (Abnormal) Collected:  09/17/20 0514    Lab Status:  Final result Specimen:  Blood from Arm, Left Updated:  09/17/20 0545     Lipase 34 u/L     CBC and differential [[id number]]  (Abnormal) Collected:  09/17/20 0514    Lab Status:  Final result Specimen:  Blood from Arm, Left Updated:  09/17/20 0536     WBC 3 72 Thousand/uL      RBC 5 11 Million/uL      Hemoglobin 14 6 g/dL      Hematocrit 44 7 %      MCV 88 fL      MCH 28 6 pg      MCHC 32 7 g/dL      RDW 14 0 %      MPV 9 5 fL      Platelets 991 Thousands/uL      nRBC 0 /100 WBCs      Neutrophils Relative 66 %      Immat GRANS % 0 %      Lymphocytes Relative 22 %      Monocytes Relative 7 %      Eosinophils Relative 4 %      Basophils Relative 1 %      Neutrophils Absolute 2 48 Thousands/µL      Immature Grans Absolute 0 01 Thousand/uL      Lymphocytes Absolute 0 82 Thousands/µL      Monocytes Absolute 0 26 Thousand/µL      Eosinophils Absolute 0 13 Thousand/µL      Basophils Absolute 0 02 Thousands/µL                  CT abdomen pelvis with contrast   Final Result by Sheryl Boas, MD (09/17 0459)      Nonobstructive bowel pattern may represent mild gastroenteritis in the appropriate clinical context  Otherwise, no evidence of acute abdominopelvic process  Small chronic bilateral pleural effusions  Workstation performed: [id number]               Procedures  Procedures      ED Course  ED Course as of Sep 17 0740   Thu Sep 17, 2020   7045 Patient sleeping in NAD  [facility]  Number of Diagnoses or Management Options  Abdominal pain: new and requires workup  Constipation due to pain medication: new and requires workup  Vomiting: new and requires workup  Diagnosis management comments: 49-year-old female with previous medical history, chronic opiate use presenting emergency department constipation, abdominal pain, vomiting  Will evaluate for diverticulitis, colitis, pancreatitis, liver abnormalities, electrolyte disturbances    A workup with CT abdomen pelvis with contrast, CBC, CMP, lipase    Workup without acute findings  Patient has small chronic bilateral pleural effusions  Patient resting comfortably after Zofran and NSAIDs  Patient has scheduled follow-up with her doctor today    Will discharge with Zofran after enema/ methylnaltrexone/ bowel movement  Amount and/or Complexity of Data Reviewed  Clinical lab tests: ordered and reviewed  Tests in the radiology section of CPT®: ordered and reviewed  Decide to obtain previous medical records or to obtain history from someone other than the patient: yes  Review and summarize past medical records: yes  Independent visualization of images, tracings, or specimens: yes    Risk of Complications, Morbidity, and/or Mortality  Presenting problems: moderate  Diagnostic procedures: moderate  Management options: moderate    Patient Progress  Patient progress: stable        Disposition  Final diagnoses:   Vomiting   Abdominal pain   Constipation due to pain medication     Time reflects when diagnosis was documented in both MDM as applicable and the Disposition within this note     Time User Action Codes Description Comment    9/17/2020  7:01 AM Adolfo Triston Add [R11 10] Vomiting     9/17/2020  7:01 AM Wynonia Massa, Amy Junk Add [R10 9] Abdominal pain     9/17/2020  7:01 AM Adolfo Triston Add [K59 00] Constipation     9/17/2020  7:01 AM Wynonia Massa, Amy Junk Remove [K59 00] Constipation     9/17/2020  7:02 AM Wynonia Massa, Amy Junk Add [K59 03] Constipation due to pain medication       ED Disposition     ED Disposition Condition Date/Time Comment    Discharge Stable u Sep 17, 2020  7:01 AM Samantha Rodriguez discharge to home/self care              Follow-up Information     Follow up With Specialties Details Why Contact Info Additional [address] Emergency Department Emergency Medicine  If symptoms worsen [address]  [phone]  ED, [address] [place] [address], [address] [phone]          Patient's Medications   Discharge Prescriptions    ONDANSETRON (ZOFRAN-ODT) 4 MG DISINTEGRATING TABLET    Take 1 tablet (4 mg total) by mouth every 6 (six) hours as needed for nausea or vomiting       Start Date: 9/17/2020 End Date: -- Order Dose: 4 mg       Quantity: 20 tablet    Refills: 0     No discharge procedures on file  PDMP Review       Value Time User    PDMP Reviewed  Yes 9/5/2020 12:53 PM Kwaku Charles MD           ED Provider  Attending physically available and evaluated Samantha Garza I managed the patient along with the ED Attending      Electronically Signed by         Winton Mortimer, DO  09/17/20 0783

## 2020-09-17 NOTE — ED ATTENDING ATTESTATION
9/17/2020  ILucille MD, saw and evaluated the patient  I have discussed the patient with the resident/non-physician practitioner and agree with the resident's/non-physician practitioner's findings, Plan of Care, and MDM as documented in the resident's/non-physician practitioner's note, except where noted  All available labs and Radiology studies were reviewed  I was present for key portions of any procedure(s) performed by the resident/non-physician practitioner and I was immediately available to provide assistance  At this point I agree with the current assessment done in the Emergency Department  I have conducted an independent evaluation of this patient a history and physical is as follows:    ED Course     Patient presents for evaluation secondary to 1 week of constipation and abdominal pain  Patient reports 1 episode of vomiting  +flatus  No hx of abdominal surgery  Patient is on significant amounts of narcotics  No additional complaints  Exam: AAOx3, NAD, RRR, CTA, mild diffuse abd TTP and distention  A/P: Abdominal pain, constipation likely narcotic induced  Will check labs and CT A/P  If only constipation present, will treat with enema and relastor       Critical Care Time  Procedures

## 2020-09-18 ENCOUNTER — TELEPHONE (OUTPATIENT)
Dept: OBGYN CLINIC | Facility: MEDICAL CENTER | Age: 55
End: 2020-09-18

## 2020-09-18 ENCOUNTER — TELEPHONE (OUTPATIENT)
Dept: NEUROLOGY | Facility: CLINIC | Age: 55
End: 2020-09-18

## 2020-09-18 ENCOUNTER — PATIENT OUTREACH (OUTPATIENT)
Dept: INTERNAL MEDICINE CLINIC | Facility: CLINIC | Age: 55
End: 2020-09-18

## 2020-09-18 NOTE — TELEPHONE ENCOUNTER
Patient called me since receiving message that we need to postpone her total joint surgery  I called the neuro dept and they are working on seeing her earlier than Feb to clear for balance issues preop  They will call patient directly to schedule with her  Note to Dr Niru Mora to be certain this is required, patient feels she is able to get her medical clearance completed

## 2020-09-18 NOTE — TELEPHONE ENCOUNTER
Patient sees Dr Mitzi Cruz    Patient called back stating we called her but actually it was her PCP office calling

## 2020-09-18 NOTE — PROGRESS NOTES
CHW received a call from [facility] stating that they will be picking up Samantha at [facility] in [place] at 930 am  Thursday October 1, 2020  CHW called [address] [address] [address] and left a message letting her know about the face to face evaluation  CHW also [employer] and left a message letting her know the time and the date for her   CHW will remain available until all things concerning the patients ride have been completed

## 2020-09-29 ENCOUNTER — OFFICE VISIT (OUTPATIENT)
Dept: BARIATRICS | Facility: CLINIC | Age: 55
End: 2020-09-29

## 2020-09-29 VITALS
BODY MASS INDEX: 42.88 KG/M2 | HEART RATE: 78 BPM | HEIGHT: 59 IN | RESPIRATION RATE: 16 BRPM | WEIGHT: 212.7 LBS | TEMPERATURE: 97.4 F | DIASTOLIC BLOOD PRESSURE: 64 MMHG | SYSTOLIC BLOOD PRESSURE: 100 MMHG

## 2020-09-29 DIAGNOSIS — G47.33 OSA (OBSTRUCTIVE SLEEP APNEA): ICD-10-CM

## 2020-09-29 DIAGNOSIS — Z01.818 PREOP EXAMINATION: ICD-10-CM

## 2020-09-29 DIAGNOSIS — E66.01 OBESITY, CLASS III, BMI 40-49.9 (MORBID OBESITY) (HCC): Primary | ICD-10-CM

## 2020-09-29 PROCEDURE — RECHECK

## 2020-09-29 NOTE — PROGRESS NOTES
Bariatric Nutrition Assessment Note    Type of surgery    Preop  Surgery Date: TBD  Surgeon: Undecided    Nutrition Assessment   Samantha Mccarthyry  62 y o   female     Wt with BMI of 25: 122 lbs  Pre-Op Excess Wt: 90 lbs  Blood pressure 100/64, pulse 78, temperature (!) 97 4 °F (36 3 °C), temperature source Temporal, resp  rate 16, height 4' 10 5" (1 486 m), weight 96 5 kg (212 lb 11 2 oz), not currently breastfeeding  Weight History   Onset of Obesity: Adult  Family history of obesity: Yes  Wt Loss Attempts: OTC meds/supplements, self-directed-smaller portions, skipping meals  Maximum Wt Lost: just started AYDEN    Review of History and Medications   Past Medical History:   Diagnosis Date    Acid reflux     Anxiety     RESOLVED: [id number]    Arthritis     Bipolar 2 disorder (HCC)     FOLLOWS WITH PSYCHIATRIST  CONTINUE LAMOTRIGINE; RESOLVED: [id number]    Depression     Familial tremor     both hands    Fibromyalgia     LAST ASSESSED: [id number]    Hearing aid worn     left ear    Jamul (hard of hearing)     left ear    Hypertension     Left-sided weakness     Lower back pain     Memory loss of unknown cause     long and short term    Migraine     Obesity     Overactive bladder     Panic attack     Post traumatic stress disorder     Seasonal allergies     Stroke [facility])     questionable stroke     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    CONT ASA 81 MG DAILY; RESOLVED: [id number]    Urinary incontinence     Wears dentures     partial lower / full upper    Wears glasses      Past Surgical History:   Procedure Laterality Date    BACK SURGERY       SECTION      COLONOSCOPY      RESOLVED: [id number]    EAR SURGERY      EGD      HYSTERECTOMY      MYRINGOTOMY W/ TUBES Left     NECK SURGERY  2019    [place] CYSTOURETHROSCOPY N/A 2016    Procedure: CYSTOSCOPY, BOTOX INJECTION;  Surgeon: Vijaya Boo MD;  Location: [facility];  Service: Gynecology    WI PERCUT IMPLNT NEUROELECT,EPIDURAL Right 7/28/2020    Procedure: INSERTION THORACIC DORSAL COLUMN SPINAL CORD STIMULATOR PERCUTANEOUS W IMPLANTABLE PULSE GENERATOR, RIGHT;  Surgeon: Elsy Ha MD;  Location:  MAIN OR;  Service: Neurosurgery    TONSILLECTOMY      TUBAL LIGATION  1986     Social History     Socioeconomic History    Marital status:      Spouse name: None    Number of children: 2    Years of education: graduate school     Highest education level: None   Occupational History    Occupation: Disabled   Social Needs    Financial resource strain: Somewhat hard    Food insecurity     Worry: None     Inability: None    Transportation needs     Medical: None     Non-medical: None   Tobacco Use    Smoking status: Never Smoker    Smokeless tobacco: Never Used   Substance and Sexual Activity    Alcohol use: Not Currently     Comment: 2 x year; being a social drinker as per all scripts     Drug use: No    Sexual activity: None   Lifestyle    Physical activity     Days per week: None     Minutes per session: None    Stress: None   Relationships    Social connections     Talks on phone: None     Gets together: None     Attends [employer] service: None     Active member of club or organization: None     Attends meetings of clubs or organizations: None     Relationship status: None    Intimate partner violence     Fear of current or ex partner: None     Emotionally abused: None     Physically abused: None     Forced sexual activity: None   Other Topics Concern    None   Social History Narrative    Bereavement    Daily caffeine consumption, 6-8 servings per day     as per all scripts    Lives in [place]        Current Outpatient Medications:     amLODIPine (NORVASC) 5 mg tablet, Take 1 tablet (5 mg total) by mouth daily, Disp: 90 tablet, Rfl: 3    ascorbic acid (VITAMIN C) 500 MG tablet, Take 1 tablet (500 mg total) by mouth 2 (two) times a day, Disp: 60 tablet, Rfl: 1    busPIRone (BUSPAR) 5 mg tablet, TAKE 1 TABLET (5 MG TOTAL) BY MOUTH 3 (THREE) TIMES A DAY, Disp: 90 tablet, Rfl: 0    cholecalciferol (VITAMIN D3) 1,000 units tablet, Take 1 tablet (1,000 Units total) by mouth daily, Disp: 90 tablet, Rfl: 5    diclofenac sodium (VOLTAREN) 1 %, APPLY 4 G TOPICALLY 4 (FOUR) TIMES A DAY, Disp: 100 g, Rfl: 0    DULoxetine (CYMBALTA) 30 mg delayed release capsule, Take 30 mg by mouth daily, Disp: , Rfl:     DULoxetine (CYMBALTA) 60 mg delayed release capsule, Take 1 capsule (60 mg total) by mouth daily, Disp: 90 capsule, Rfl: 0    ferrous sulfate 324 (65 Fe) mg, Take 1 tablet (324 mg total) by mouth 2 (two) times a day before meals, Disp: 60 tablet, Rfl: 1    folic acid (FOLVITE) 1 mg tablet, Take 1 tablet (1 mg total) by mouth daily, Disp: 30 tablet, Rfl: 1    morphine (MS CONTIN) 15 mg 12 hr tablet, Take 1 tablet (15 mg total) by mouth 2 (two) times a day Hold if sedatedMax Daily Amount: 30 mg, Disp: 20 tablet, Rfl: 0    Multiple Vitamins-Minerals (multivitamin with minerals) tablet, Take 1 tablet by mouth daily, Disp: 30 tablet, Rfl: 1    ondansetron (ZOFRAN-ODT) 4 mg disintegrating tablet, Take 1 tablet (4 mg total) by mouth every 6 (six) hours as needed for nausea or vomiting, Disp: 20 tablet, Rfl: 0    pregabalin (LYRICA) 150 mg capsule, Take 1 capsule (150 mg total) by mouth 3 (three) times a day, Disp: 90 capsule, Rfl: 2    propranolol (INDERAL) 20 mg tablet, Take 1 tablet (20 mg total) by mouth 2 (two) times a day, Disp: 180 tablet, Rfl: 3    QUEtiapine (SEROquel XR) 300 mg 24 hr tablet, Take 1 tablet (300 mg total) by mouth daily at bedtime, Disp: 60 tablet, Rfl: 1    traZODone (DESYREL) 150 mg tablet, Take 1 tablet (150 mg total) by mouth daily at bedtime Hold if sedated, Disp: 30 tablet, Rfl: 0    Valbenazine Tosylate (Ingrezza) 80 MG CAPS, Take 80 mg by mouth daily, Disp: , Rfl:     hydrOXYzine HCL (ATARAX) 50 mg tablet, Take 1 tablet (50 mg total) by mouth daily at bedtime as needed for anxiety (insomnia) (Patient not taking: Reported on 9/29/2020), Disp: 90 tablet, Rfl: 2    lidocaine (LMX) 4 % cream, Apply topically as needed for mild pain (Patient not taking: Reported on 9/29/2020), Disp: 30 g, Rfl: 0    LORazepam (ATIVAN) 0 5 mg tablet, Take 1 tablet (0 5 mg total) by mouth every 8 (eight) hours as needed for anxiety for up to 10 days Hold if sedated, Disp: 20 tablet, Rfl: 0    omeprazole (PriLOSEC) 20 mg delayed release capsule, Take 1 capsule (20 mg total) by mouth daily, Disp: 90 capsule, Rfl: 3    oxybutynin (DITROPAN) 5 mg tablet, Take 1 tablet (5 mg total) by mouth 2 (two) times a day, Disp: 180 tablet, Rfl: 3    prazosin (MINIPRESS) 2 mg capsule, TAKE 1 CAPSULE BY MOUTH EVERY DAY (Patient not taking: Reported on 9/29/2020), Disp: 30 capsule, Rfl: 1    triamcinolone (KENALOG) 0 025 % cream, Apply topically 2 (two) times a day (Patient not taking: Reported on 9/29/2020), Disp: 30 g, Rfl: 0  Food Intake and Lifestyle Assessment   Food Intake Assessment completed via usual diet recall (Lives in NH past few weeks)  Breakfast: oatmeal or pancake and sausage, oj and small milk  Snack: 0   Lunch: meat, french fries, green beans  Snack: canned fruit or chips  Dinner: chicken, mashed , vegetables  Snack: crackers   Beverage intake: water and regular soda  Protein supplement: 0  Estimated protein intake per day: >60 gm  Estimated fluid intake per day: 50 oz  Meals eaten away from home: none  Typical meal pattern: 3 meals per day and 1-2 snacks per day  Eating Behaviors: Large portion sizes and Frequent snacking/ grazing (prior to living in NH)  Food allergies or intolerances: No Known Allergies  Cultural or Spiritism considerations: N/A    Physical Assessment  Physical Activity  Types of exercise: None  Current physical limitations: Pt is able to walk with a walker (not that good per pt)    Psychosocial Assessment   Support systems: sibling(s)  Socioeconomic factors: disabled-wheelchair and walker    Nutrition Diagnosis  Diagnosis: Overweight / Obesity (NC-3 3)  Related to: Physical inactivity and Excessive energy intake  As Evidenced by: BMI >25     Nutrition Prescription: Recommend the following diet  Low fat, Low sugar and High protein    Interventions and Teaching   Discussed pre-op and post-op nutrition guidelines  Patient educated and handouts provided  Surgical changes to stomach / GI  Capacity of post-surgery stomach  Diet progression  Adequate hydration  Sugar and fat restriction to decrease "dumping syndrome"  Fat restriction to decrease steatorrhea  Expected weight loss  Weight loss plateaus/ possibility of weight regain  Exercise  Suggestions for pre-op diet  Nutrition considerations after surgery  Protein supplements  Meal planning and preparation  Appropriate carbohydrate, protein, and fat intake, and food/fluid choices to maximize safe weight loss, nutrient intake, and tolerance   Dietary and lifestyle changes  Possible problems with poor eating habits  Intuitive eating  Techniques for self monitoring and keeping daily food journal  Potential for food intolerance after surgery, and ways to deal with them including: lactose intolerance, nausea, reflux, vomiting, diarrhea, food intolerance, appetite changes, gas  Vitamin / Mineral supplementation of Multivitamin with minerals and Vitamin D    Education provided to: patient    Barriers to learning: Physical disability  Readiness to change: preparation    Prior research on procedure: discussed with provider    Comprehension: verbalizes understanding     Expected Compliance: good  Recommendations  Pt is an appropriate candidate for surgery   Yes  Evaluation / Monitoring  Dietitian to Monitor: Eating pattern as discussed Body weight Lab values Physical activity Bowel pattern    Goals  Eliminate sugar sweetened beverages, Exercise 30 minutes 5 times per week, Complete lession plans 1-6, Eat 3 meals per day and Eliminate mindless snacking    Time Spent:   1 Hour
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
## 2020-09-30 ENCOUNTER — PATIENT OUTREACH (OUTPATIENT)
Dept: INTERNAL MEDICINE CLINIC | Facility: CLINIC | Age: 55
End: 2020-09-30

## 2020-09-30 NOTE — PROGRESS NOTES
Patient called CHW this morning at 730 and CHW was not available  CHW called the patient back at 8 and the patient stated that she will be going on her face to face with Bobo on October 1st but did not know what time  CHW called Good will with the patient on the telephone but no one answered  CHW left a message for Bobo to call CHW back so the time can be discussed  When CHW becomes aware of the  time CHW will call the patient to let  Her know when they will pick her up from [facility]

## 2020-10-01 ENCOUNTER — PATIENT OUTREACH (OUTPATIENT)
Dept: INTERNAL MEDICINE CLINIC | Facility: CLINIC | Age: 55
End: 2020-10-01

## 2020-10-06 ENCOUNTER — OFFICE VISIT (OUTPATIENT)
Dept: OBGYN CLINIC | Facility: MEDICAL CENTER | Age: 55
End: 2020-10-06
Payer: COMMERCIAL

## 2020-10-06 ENCOUNTER — PATIENT OUTREACH (OUTPATIENT)
Dept: INTERNAL MEDICINE CLINIC | Facility: CLINIC | Age: 55
End: 2020-10-06

## 2020-10-06 VITALS
HEIGHT: 59 IN | TEMPERATURE: 98 F | WEIGHT: 212 LBS | DIASTOLIC BLOOD PRESSURE: 70 MMHG | SYSTOLIC BLOOD PRESSURE: 119 MMHG | BODY MASS INDEX: 42.74 KG/M2

## 2020-10-06 DIAGNOSIS — M17.11 ARTHRITIS OF RIGHT KNEE: Primary | ICD-10-CM

## 2020-10-06 PROCEDURE — 3078F DIAST BP <80 MM HG: CPT | Performed by: ORTHOPAEDIC SURGERY

## 2020-10-06 PROCEDURE — 99213 OFFICE O/P EST LOW 20 MIN: CPT | Performed by: ORTHOPAEDIC SURGERY

## 2020-10-06 PROCEDURE — 3074F SYST BP LT 130 MM HG: CPT | Performed by: ORTHOPAEDIC SURGERY

## 2020-10-07 ENCOUNTER — PATIENT OUTREACH (OUTPATIENT)
Dept: INTERNAL MEDICINE CLINIC | Facility: CLINIC | Age: 55
End: 2020-10-07

## 2020-10-09 ENCOUNTER — TELEPHONE (OUTPATIENT)
Dept: OBGYN CLINIC | Facility: HOSPITAL | Age: 55
End: 2020-10-09

## 2020-10-12 NOTE — TELEPHONE ENCOUNTER
Can we please schedule appt for medical evaluation    Form was reviewed and determined patient needs an appt for MA-51  form to be filled out

## 2020-10-14 ENCOUNTER — PATIENT OUTREACH (OUTPATIENT)
Dept: INTERNAL MEDICINE CLINIC | Facility: CLINIC | Age: 55
End: 2020-10-14

## 2020-10-15 ENCOUNTER — OFFICE VISIT (OUTPATIENT)
Dept: BARIATRICS | Facility: CLINIC | Age: 55
End: 2020-10-15
Payer: COMMERCIAL

## 2020-10-15 DIAGNOSIS — F31.81 BIPOLAR II DISORDER (HCC): ICD-10-CM

## 2020-10-15 DIAGNOSIS — G89.4 CHRONIC PAIN SYNDROME: ICD-10-CM

## 2020-10-15 DIAGNOSIS — R26.2 AMBULATORY DYSFUNCTION: ICD-10-CM

## 2020-10-15 DIAGNOSIS — Z96.89 STATUS POST INSERTION OF SPINAL CORD STIMULATOR: ICD-10-CM

## 2020-10-15 DIAGNOSIS — G24.01 TARDIVE DYSKINESIA: ICD-10-CM

## 2020-10-15 DIAGNOSIS — E66.01 OBESITY, CLASS III, BMI 40-49.9 (MORBID OBESITY) (HCC): Primary | ICD-10-CM

## 2020-10-15 DIAGNOSIS — I10 ESSENTIAL HYPERTENSION: ICD-10-CM

## 2020-10-15 DIAGNOSIS — R13.10 DYSPHAGIA, UNSPECIFIED TYPE: ICD-10-CM

## 2020-10-15 PROCEDURE — 1036F TOBACCO NON-USER: CPT | Performed by: PHYSICIAN ASSISTANT

## 2020-10-15 PROCEDURE — 99213 OFFICE O/P EST LOW 20 MIN: CPT | Performed by: PHYSICIAN ASSISTANT

## 2020-10-16 ENCOUNTER — TELEPHONE (OUTPATIENT)
Dept: INTERNAL MEDICINE CLINIC | Facility: CLINIC | Age: 55
End: 2020-10-16

## 2020-10-19 ENCOUNTER — OFFICE VISIT (OUTPATIENT)
Dept: GASTROENTEROLOGY | Facility: CLINIC | Age: 55
End: 2020-10-19
Payer: COMMERCIAL

## 2020-10-19 VITALS
HEIGHT: 61 IN | TEMPERATURE: 97.1 F | WEIGHT: 206 LBS | DIASTOLIC BLOOD PRESSURE: 76 MMHG | HEART RATE: 75 BPM | BODY MASS INDEX: 38.89 KG/M2 | SYSTOLIC BLOOD PRESSURE: 110 MMHG

## 2020-10-19 DIAGNOSIS — G47.01 INSOMNIA DUE TO MEDICAL CONDITION: ICD-10-CM

## 2020-10-19 DIAGNOSIS — R13.19 ESOPHAGEAL DYSPHAGIA: Primary | ICD-10-CM

## 2020-10-19 DIAGNOSIS — Z80.0 FAMILY HISTORY OF COLON CANCER: ICD-10-CM

## 2020-10-19 DIAGNOSIS — K21.9 GASTROESOPHAGEAL REFLUX DISEASE WITHOUT ESOPHAGITIS: ICD-10-CM

## 2020-10-19 DIAGNOSIS — G47.33 OSA (OBSTRUCTIVE SLEEP APNEA): Primary | ICD-10-CM

## 2020-10-19 PROCEDURE — 99214 OFFICE O/P EST MOD 30 MIN: CPT | Performed by: PHYSICIAN ASSISTANT

## 2020-10-19 NOTE — TELEPHONE ENCOUNTER
I called patient she doesn't know when the surgery will be rescheduled to  It most likely will be a couple months  She will call to let us know when she does need a new appointment to have form filled out

## 2020-10-20 ENCOUNTER — TELEPHONE (OUTPATIENT)
Dept: BARIATRICS | Facility: CLINIC | Age: 55
End: 2020-10-20

## 2020-10-20 ENCOUNTER — PATIENT OUTREACH (OUTPATIENT)
Dept: INTERNAL MEDICINE CLINIC | Facility: CLINIC | Age: 55
End: 2020-10-20

## 2020-10-22 ENCOUNTER — PATIENT OUTREACH (OUTPATIENT)
Dept: INTERNAL MEDICINE CLINIC | Facility: CLINIC | Age: 55
End: 2020-10-22

## 2020-10-27 ENCOUNTER — OFFICE VISIT (OUTPATIENT)
Dept: SLEEP CENTER | Facility: CLINIC | Age: 55
End: 2020-10-27
Payer: COMMERCIAL

## 2020-10-27 VITALS
HEIGHT: 61 IN | BODY MASS INDEX: 41.97 KG/M2 | DIASTOLIC BLOOD PRESSURE: 55 MMHG | SYSTOLIC BLOOD PRESSURE: 110 MMHG | WEIGHT: 222.3 LBS | HEART RATE: 78 BPM | TEMPERATURE: 97.4 F

## 2020-10-27 DIAGNOSIS — G47.33 OSA (OBSTRUCTIVE SLEEP APNEA): ICD-10-CM

## 2020-10-27 DIAGNOSIS — E66.01 OBESITY, CLASS III, BMI 40-49.9 (MORBID OBESITY) (HCC): ICD-10-CM

## 2020-10-27 DIAGNOSIS — Z01.818 PREOP EXAMINATION: ICD-10-CM

## 2020-10-27 PROCEDURE — 99203 OFFICE O/P NEW LOW 30 MIN: CPT | Performed by: INTERNAL MEDICINE

## 2020-10-29 ENCOUNTER — TELEPHONE (OUTPATIENT)
Dept: BARIATRICS | Facility: CLINIC | Age: 55
End: 2020-10-29

## 2020-11-02 ENCOUNTER — TELEPHONE (OUTPATIENT)
Dept: BARIATRICS | Facility: CLINIC | Age: 55
End: 2020-11-02

## 2020-11-03 ENCOUNTER — TELEPHONE (OUTPATIENT)
Dept: BARIATRICS | Facility: CLINIC | Age: 55
End: 2020-11-03

## 2020-11-09 ENCOUNTER — TELEPHONE (OUTPATIENT)
Dept: NEUROLOGY | Facility: CLINIC | Age: 55
End: 2020-11-09

## 2020-11-10 ENCOUNTER — TELEPHONE (OUTPATIENT)
Dept: BARIATRICS | Facility: CLINIC | Age: 55
End: 2020-11-10

## 2020-11-12 ENCOUNTER — TELEPHONE (OUTPATIENT)
Dept: BARIATRICS | Facility: CLINIC | Age: 55
End: 2020-11-12

## 2020-11-17 ENCOUNTER — HOSPITAL ENCOUNTER (OUTPATIENT)
Dept: GASTROENTEROLOGY | Facility: HOSPITAL | Age: 55
Discharge: HOME | End: 2020-11-17
Payer: COMMERCIAL

## 2020-11-17 VITALS
OXYGEN SATURATION: 97 % | DIASTOLIC BLOOD PRESSURE: 68 MMHG | TEMPERATURE: 98.3 F | HEART RATE: 64 BPM | SYSTOLIC BLOOD PRESSURE: 110 MMHG | RESPIRATION RATE: 16 BRPM

## 2020-11-17 DIAGNOSIS — K21.9 GASTROESOPHAGEAL REFLUX DISEASE WITHOUT ESOPHAGITIS: ICD-10-CM

## 2020-11-17 DIAGNOSIS — R13.19 ESOPHAGEAL DYSPHAGIA: ICD-10-CM

## 2020-11-17 PROCEDURE — 91020 GASTRIC MOTILITY STUDIES: CPT

## 2020-11-17 PROCEDURE — 91038 ESOPH IMPED FUNCT TEST > 1HR: CPT

## 2020-11-20 ENCOUNTER — TELEMEDICINE (OUTPATIENT)
Dept: NEUROLOGY | Facility: CLINIC | Age: 55
End: 2020-11-20
Payer: COMMERCIAL

## 2020-11-20 ENCOUNTER — TELEPHONE (OUTPATIENT)
Dept: NEUROLOGY | Facility: CLINIC | Age: 55
End: 2020-11-20

## 2020-11-20 DIAGNOSIS — Z96.89 STATUS POST INSERTION OF SPINAL CORD STIMULATOR: ICD-10-CM

## 2020-11-20 DIAGNOSIS — M51.16 LUMBAR DISC DISEASE WITH RADICULOPATHY: ICD-10-CM

## 2020-11-20 DIAGNOSIS — M17.10 OSTEOARTHRITIS OF KNEE, UNSPECIFIED LATERALITY, UNSPECIFIED OSTEOARTHRITIS TYPE: ICD-10-CM

## 2020-11-20 DIAGNOSIS — R56.9 SEIZURE-LIKE ACTIVITY (HCC): ICD-10-CM

## 2020-11-20 DIAGNOSIS — R26.2 AMBULATORY DYSFUNCTION: Primary | ICD-10-CM

## 2020-11-20 DIAGNOSIS — M96.1 POST LAMINECTOMY SYNDROME: ICD-10-CM

## 2020-11-20 DIAGNOSIS — M47.816 LUMBAR SPONDYLOSIS: ICD-10-CM

## 2020-11-20 PROCEDURE — G2012 BRIEF CHECK IN BY MD/QHP: HCPCS | Performed by: PSYCHIATRY & NEUROLOGY

## 2020-11-24 ENCOUNTER — TELEPHONE (OUTPATIENT)
Dept: BARIATRICS | Facility: CLINIC | Age: 55
End: 2020-11-24

## 2020-11-24 ENCOUNTER — OFFICE VISIT (OUTPATIENT)
Dept: OBGYN CLINIC | Facility: MEDICAL CENTER | Age: 55
End: 2020-11-24
Payer: COMMERCIAL

## 2020-11-24 VITALS
SYSTOLIC BLOOD PRESSURE: 104 MMHG | HEIGHT: 61 IN | DIASTOLIC BLOOD PRESSURE: 72 MMHG | BODY MASS INDEX: 42 KG/M2 | HEART RATE: 85 BPM

## 2020-11-24 DIAGNOSIS — M17.0 BILATERAL PRIMARY OSTEOARTHRITIS OF KNEE: Primary | ICD-10-CM

## 2020-11-24 PROCEDURE — 99213 OFFICE O/P EST LOW 20 MIN: CPT | Performed by: ORTHOPAEDIC SURGERY

## 2020-11-25 PROCEDURE — 91010 ESOPHAGUS MOTILITY STUDY: CPT | Performed by: INTERNAL MEDICINE

## 2020-11-25 PROCEDURE — 91038 ESOPH IMPED FUNCT TEST > 1HR: CPT | Performed by: INTERNAL MEDICINE

## 2020-11-27 ENCOUNTER — OFFICE VISIT (OUTPATIENT)
Dept: NEUROLOGY | Facility: CLINIC | Age: 55
End: 2020-11-27
Payer: COMMERCIAL

## 2020-11-27 VITALS
SYSTOLIC BLOOD PRESSURE: 122 MMHG | HEIGHT: 61 IN | HEART RATE: 75 BPM | DIASTOLIC BLOOD PRESSURE: 83 MMHG | TEMPERATURE: 97.9 F | BODY MASS INDEX: 39.27 KG/M2 | WEIGHT: 208 LBS

## 2020-11-27 DIAGNOSIS — M54.16 RADICULOPATHY, LUMBAR REGION: Primary | ICD-10-CM

## 2020-11-27 DIAGNOSIS — M79.2 NEUROPATHIC PAIN: ICD-10-CM

## 2020-11-27 DIAGNOSIS — M62.81 PROXIMAL MUSCLE WEAKNESS: ICD-10-CM

## 2020-11-27 DIAGNOSIS — R79.82 ELEVATED C-REACTIVE PROTEIN (CRP): ICD-10-CM

## 2020-11-27 DIAGNOSIS — E53.8 B12 DEFICIENCY: ICD-10-CM

## 2020-11-27 DIAGNOSIS — M25.50 ARTHRALGIA, UNSPECIFIED JOINT: ICD-10-CM

## 2020-11-27 PROCEDURE — 3008F BODY MASS INDEX DOCD: CPT | Performed by: PSYCHIATRY & NEUROLOGY

## 2020-11-27 PROCEDURE — 3079F DIAST BP 80-89 MM HG: CPT | Performed by: PSYCHIATRY & NEUROLOGY

## 2020-11-27 PROCEDURE — 99215 OFFICE O/P EST HI 40 MIN: CPT | Performed by: PSYCHIATRY & NEUROLOGY

## 2020-11-27 PROCEDURE — 1036F TOBACCO NON-USER: CPT | Performed by: PSYCHIATRY & NEUROLOGY

## 2020-11-27 PROCEDURE — 3074F SYST BP LT 130 MM HG: CPT | Performed by: PSYCHIATRY & NEUROLOGY

## 2020-11-27 RX ORDER — OXYCODONE HYDROCHLORIDE 5 MG/1
2.5 TABLET ORAL EVERY 4 HOURS PRN
COMMUNITY
Start: 2020-10-28 | End: 2021-03-09 | Stop reason: SDUPTHER

## 2020-11-27 RX ORDER — IPRATROPIUM BROMIDE AND ALBUTEROL SULFATE 2.5; .5 MG/3ML; MG/3ML
SOLUTION RESPIRATORY (INHALATION)
COMMUNITY
Start: 2020-11-18 | End: 2021-03-29 | Stop reason: SDUPTHER

## 2020-11-27 RX ORDER — PREGABALIN 225 MG/1
225 CAPSULE ORAL EVERY 12 HOURS
COMMUNITY
Start: 2020-11-20 | End: 2021-03-09 | Stop reason: SDUPTHER

## 2020-11-27 RX ORDER — CETIRIZINE HYDROCHLORIDE 10 MG/1
10 TABLET ORAL AS NEEDED
COMMUNITY
Start: 2020-11-19 | End: 2021-07-07 | Stop reason: HOSPADM

## 2020-12-03 ENCOUNTER — OFFICE VISIT (OUTPATIENT)
Dept: BARIATRICS | Facility: CLINIC | Age: 55
End: 2020-12-03

## 2020-12-03 DIAGNOSIS — E66.01 MORBID OBESITY WITH BMI OF 40.0-44.9, ADULT (HCC): Primary | ICD-10-CM

## 2020-12-03 PROCEDURE — RECHECK

## 2020-12-29 ENCOUNTER — TELEPHONE (OUTPATIENT)
Dept: NEUROSURGERY | Facility: CLINIC | Age: 55
End: 2020-12-29

## 2021-01-18 ENCOUNTER — TELEPHONE (OUTPATIENT)
Dept: NEUROSURGERY | Facility: CLINIC | Age: 56
End: 2021-01-18

## 2021-01-18 ENCOUNTER — OFFICE VISIT (OUTPATIENT)
Dept: NEUROSURGERY | Facility: CLINIC | Age: 56
End: 2021-01-18
Payer: COMMERCIAL

## 2021-01-18 VITALS
DIASTOLIC BLOOD PRESSURE: 80 MMHG | TEMPERATURE: 97.7 F | HEIGHT: 61 IN | BODY MASS INDEX: 39.3 KG/M2 | SYSTOLIC BLOOD PRESSURE: 130 MMHG

## 2021-01-18 DIAGNOSIS — G89.4 CHRONIC PAIN SYNDROME: Primary | ICD-10-CM

## 2021-01-18 PROCEDURE — 3075F SYST BP GE 130 - 139MM HG: CPT | Performed by: NEUROLOGICAL SURGERY

## 2021-01-18 PROCEDURE — 99215 OFFICE O/P EST HI 40 MIN: CPT | Performed by: NEUROLOGICAL SURGERY

## 2021-01-18 PROCEDURE — 3079F DIAST BP 80-89 MM HG: CPT | Performed by: NEUROLOGICAL SURGERY

## 2021-01-18 RX ORDER — CHLORHEXIDINE GLUCONATE 0.12 MG/ML
15 RINSE ORAL ONCE
Status: CANCELLED | OUTPATIENT
Start: 2021-01-18 | End: 2021-01-18

## 2021-01-18 RX ORDER — ACETAMINOPHEN 325 MG/1
650 TABLET ORAL EVERY 6 HOURS PRN
COMMUNITY
End: 2021-03-09 | Stop reason: SDUPTHER

## 2021-01-18 RX ORDER — CEFAZOLIN SODIUM 2 G/50ML
2000 SOLUTION INTRAVENOUS ONCE
Status: CANCELLED | OUTPATIENT
Start: 2021-01-18 | End: 2021-01-18

## 2021-01-18 NOTE — PROGRESS NOTES
Assessment/Plan:    No problem-specific Assessment & Plan notes found for this encounter  Patient with generator that has a slight tilt she is able to recharge in a leaned position  There is some recharging issues still  After several attempts to recharge apparently the generator is tipped in a position that is difficult to recharge  We will replace the generator with a non rechargeable primary cell generator, [employer]  Expected postoperative course, including activity restrictions, expected pain and postoperative medication were reviewed  Patient provided verbal consent to surgical procedure and signed consent form: Yes    We also discussed the risks and benefits of the procedure the risks being including: infection (~2%), hardware issues  The benefits including reestablishment of consistent therapy  The patient stated understanding of the risks and benefits and agreed to proceed  IMAGING REVIEWED: CT a/p shows tilted generator     Diagnoses and all orders for this visit:    Chronic pain syndrome  -     Case request operating room: REPLACEMENT IMPLANTABLE [employer]; Standing  -     Case request operating room: REPLACEMENT IMPLANTABLE PULSE GENERATOR DORSAL SPINAL COLUMN STIMULATOR    Other orders  -     acetaminophen (TYLENOL) 325 mg tablet; Take 650 mg by mouth every 6 (six) hours as needed for mild pain  -     Diet NPO; Sips with meds; Standing  -     Nursing Communication Warmimg Interventions Implemented; Standing  -     Nursing Communication CHG bath, have staff wash entire body (neck down) per pre-op bathing protocol  Routine, evening prior to, and day of surgery ; Standing  -     Nursing Communication Swab both nares with Povidone-Iodine solution, EXCLUDE if patient has shellfish/Iodine allergy   Routine, day of surgery, on call to OR; Standing  -     chlorhexidine (PERIDEX) 0 12 % oral rinse 15 mL  -     Void on call to OR; Standing  -     Insert peripheral IV; Standing  -     ceFAZolin (ANCEF) IVPB (premix in dextrose) 2,000 mg 50 mL        I have spent 40 minutes with Patient  today in which greater than 50% of this time was spent in counseling/coordination of care regarding Diagnostic results, Prognosis, Risks and benefits of tx options, Intructions for management, Patient and family education, Importance of tx compliance, Risk factor reductions and Impressions  Subjective:      Patient ID: Yoon Crabtree is a 60 y o  female  HPI    Patient is several months from SCS implantation  She reports issues with recharging and would like to switch to a non rechargeable generator  After several attempts to charge unfortunately she reports that she is not able to do the charging by herself  The following portions of the patient's history were reviewed and updated as appropriate: allergies, current medications, past family history, past medical history, past social history, past surgical history and problem list     Review of Systems   Constitutional: Negative  HENT: Negative  Eyes: Negative  Respiratory: Negative  Cardiovascular: Negative  Gastrointestinal: Negative  Endocrine: Negative  Genitourinary: Negative  Musculoskeletal: Positive for back pain (Low back pain into the buttocks and Right  leg) and gait problem (Uses a walker  )  Skin: Negative  Allergic/Immunologic: Negative  Neurological: Positive for numbness (Stomach and top of right thigh)  Hematological: Negative  Psychiatric/Behavioral: Negative  I have personally reviewed all aspects of the review of systems as documented    Objective:      /80 (BP Location: Left arm, Patient Position: Sitting, Cuff Size: Standard)   Temp 97 7 °F (36 5 °C) (Temporal)   Ht 5' 1" (1 549 m)   BMI 39 30 kg/m²          Physical Exam  Constitutional:       Appearance: Normal appearance  She is obese  HENT:      Head: Normocephalic and atraumatic     Eyes: Extraocular Movements: Extraocular movements intact  Conjunctiva/sclera: Conjunctivae normal       Pupils: Pupils are equal, round, and reactive to light  Cardiovascular:      Rate and Rhythm: Normal rate  Pulmonary:      Effort: Pulmonary effort is normal  No respiratory distress  Breath sounds: No wheezing  Neurological:      General: No focal deficit present  Mental Status: She is alert and oriented to person, place, and time  Mental status is at baseline  Psychiatric:         Mood and Affect: Mood normal          Thought Content:  Thought content normal

## 2021-01-18 NOTE — H&P (VIEW-ONLY)
Assessment/Plan:    No problem-specific Assessment & Plan notes found for this encounter  Patient with generator that has a slight tilt she is able to recharge in a leaned position  There is some recharging issues still  After several attempts to recharge apparently the generator is tipped in a position that is difficult to recharge  We will replace the generator with a non rechargeable primary cell generator, [employer]  Expected postoperative course, including activity restrictions, expected pain and postoperative medication were reviewed  Patient provided verbal consent to surgical procedure and signed consent form: Yes    We also discussed the risks and benefits of the procedure the risks being including: infection (~2%), hardware issues  The benefits including reestablishment of consistent therapy  The patient stated understanding of the risks and benefits and agreed to proceed  IMAGING REVIEWED: CT a/p shows tilted generator     Diagnoses and all orders for this visit:    Chronic pain syndrome  -     Case request operating room: REPLACEMENT IMPLANTABLE [employer]; Standing  -     Case request operating room: REPLACEMENT IMPLANTABLE PULSE GENERATOR DORSAL SPINAL COLUMN STIMULATOR    Other orders  -     acetaminophen (TYLENOL) 325 mg tablet; Take 650 mg by mouth every 6 (six) hours as needed for mild pain  -     Diet NPO; Sips with meds; Standing  -     Nursing Communication Warmimg Interventions Implemented; Standing  -     Nursing Communication CHG bath, have staff wash entire body (neck down) per pre-op bathing protocol  Routine, evening prior to, and day of surgery ; Standing  -     Nursing Communication Swab both nares with Povidone-Iodine solution, EXCLUDE if patient has shellfish/Iodine allergy   Routine, day of surgery, on call to OR; Standing  -     chlorhexidine (PERIDEX) 0 12 % oral rinse 15 mL  -     Void on call to OR; Standing  -     Insert peripheral IV; Standing  -     ceFAZolin (ANCEF) IVPB (premix in dextrose) 2,000 mg 50 mL        I have spent 40 minutes with Patient  today in which greater than 50% of this time was spent in counseling/coordination of care regarding Diagnostic results, Prognosis, Risks and benefits of tx options, Intructions for management, Patient and family education, Importance of tx compliance, Risk factor reductions and Impressions  Subjective:      Patient ID: Hayde Mckeon is a 60 y o  female  HPI    Patient is several months from SCS implantation  She reports issues with recharging and would like to switch to a non rechargeable generator  After several attempts to charge unfortunately she reports that she is not able to do the charging by herself  The following portions of the patient's history were reviewed and updated as appropriate: allergies, current medications, past family history, past medical history, past social history, past surgical history and problem list     Review of Systems   Constitutional: Negative  HENT: Negative  Eyes: Negative  Respiratory: Negative  Cardiovascular: Negative  Gastrointestinal: Negative  Endocrine: Negative  Genitourinary: Negative  Musculoskeletal: Positive for back pain (Low back pain into the buttocks and Right  leg) and gait problem (Uses a walker  )  Skin: Negative  Allergic/Immunologic: Negative  Neurological: Positive for numbness (Stomach and top of right thigh)  Hematological: Negative  Psychiatric/Behavioral: Negative  I have personally reviewed all aspects of the review of systems as documented    Objective:      /80 (BP Location: Left arm, Patient Position: Sitting, Cuff Size: Standard)   Temp 97 7 °F (36 5 °C) (Temporal)   Ht 5' 1" (1 549 m)   BMI 39 30 kg/m²          Physical Exam  Constitutional:       Appearance: Normal appearance  She is obese  HENT:      Head: Normocephalic and atraumatic     Eyes: Extraocular Movements: Extraocular movements intact  Conjunctiva/sclera: Conjunctivae normal       Pupils: Pupils are equal, round, and reactive to light  Cardiovascular:      Rate and Rhythm: Normal rate  Pulmonary:      Effort: Pulmonary effort is normal  No respiratory distress  Breath sounds: No wheezing  Neurological:      General: No focal deficit present  Mental Status: She is alert and oriented to person, place, and time  Mental status is at baseline  Psychiatric:         Mood and Affect: Mood normal          Thought Content:  Thought content normal

## 2021-01-26 NOTE — PROGRESS NOTES
Outpatient Care Management Note:    Called and spoke with patient  She reports she is doing well and denies any symptoms or complaints at this time  Patient reports physical therapy and occupational therapy have been coming to her house and want her to do aqua therapy at [facility]  Patient voices interest in doing the aqua therapy and I made her aware there is a message out for her PCP to write order for it  Patient is using a rollator to get around and does report difficulty with getting up at times and pain in her knees  I did remind patient of her ortho appointment on 1/8 at 10 am and reports she will be taking [employer] to all her appointments as she was approved for services and has free trips to medical appointments  Patient is requesting steroid injections in her knees  I made her aware she can discuss this further with the physician at her ortho appointment on 1/8  I did remind patient of neurosurgery appot on 1/10 @ 10:15 am and gave her address and phone number for office, pain management on 1/28 @ 10:15 am, and PCP appointment on 2/13 @ 9:30 am  Patient reports she see her mental health provider on 1/26  Patient reports she is doing Recovery education through the transitional living facility she is residing at  Transitional facility is managing patient's medication  Patient reports have a stable place to live, reliable transportation to medical appointments were goals of her and still wants to work on further managing her pain  Patient being evaluated for a spinal cord stimulator  Patient does not have any further questions, concerns, or other needs at this time  Pt has my contact # and PCP office # if needed  Pt is agreeable for further outreach  siblings/other relative/parents

## 2021-02-08 LAB — HBA1C MFR BLD HPLC: 4.8 %

## 2021-02-09 ENCOUNTER — ANESTHESIA EVENT (OUTPATIENT)
Dept: PERIOP | Facility: HOSPITAL | Age: 56
End: 2021-02-09
Payer: COMMERCIAL

## 2021-02-09 ENCOUNTER — TELEPHONE (OUTPATIENT)
Dept: NEUROSURGERY | Facility: CLINIC | Age: 56
End: 2021-02-09

## 2021-02-09 ENCOUNTER — DOCUMENTATION (OUTPATIENT)
Dept: NEUROSURGERY | Facility: CLINIC | Age: 56
End: 2021-02-09

## 2021-02-09 DIAGNOSIS — Z79.2 PROPHYLACTIC ANTIBIOTIC: Primary | ICD-10-CM

## 2021-02-09 DIAGNOSIS — Z98.890 POSTOPERATIVE STATE: ICD-10-CM

## 2021-02-09 RX ORDER — CEPHALEXIN 500 MG/1
500 CAPSULE ORAL EVERY 6 HOURS SCHEDULED
Qty: 12 CAPSULE | Refills: 0 | Status: SHIPPED | OUTPATIENT
Start: 2021-02-09 | End: 2021-02-12

## 2021-02-09 NOTE — ANESTHESIA PREPROCEDURE EVALUATION
Procedure:  REPLACEMENT IMPLANTABLE PULSE GENERATOR DORSAL SPINAL COLUMN STIMULATOR, RIGHT (Right Buttocks)    Relevant Problems   CARDIO   (+) Hypertension      GI/HEPATIC   (+) Dysphagia   (+) Esophageal reflux      MUSCULOSKELETAL   (+) Arthritis of right knee   (+) Chronic bilateral low back pain with bilateral sciatica   (+) Fibromyalgia   (+) Intractable low back pain   (+) Lumbar spondylosis   (+) Osteoarthritis of both hips   (+) Osteoarthritis of knee   (+) Primary localized osteoarthritis of both knees   (+) Primary osteoarthritis of both knees   (+) Sacroiliitis (HCC)      NEURO/PSYCH   (+) Anxiety   (+) Chronic pain syndrome   (+) Fibromyalgia   (+) Generalized anxiety disorder   (+) Intractable low back pain   (+) Major depressive disorder, recurrent episode, moderate degree (HCC)   (+) Panic disorder without agoraphobia   (+) Post traumatic stress disorder      PULMONARY   (+) MIMI (obstructive sleep apnea)      EKG 9/4/2020: SB 58    Lab Results   Component Value Date    WBC 3 72 (L) 09/17/2020    HGB 14 6 09/17/2020    HCT 44 7 09/17/2020    MCV 88 09/17/2020     09/17/2020     Lab Results   Component Value Date    GLUCOSE 96 07/27/2015    CALCIUM 9 3 09/17/2020     07/27/2015    K 4 0 09/17/2020    CO2 31 09/17/2020     09/17/2020    BUN 8 09/17/2020    CREATININE 0 73 09/17/2020     Lab Results   Component Value Date    INR 1 09 09/04/2020    INR 0 92 07/08/2020    PROTIME 13 9 09/04/2020    PROTIME 12 4 07/08/2020     Lab Results   Component Value Date    PTT 33 09/04/2020     Type and Screen:  O          Physical Exam    Airway    Mallampati score: III    Neck ROM: limited     Dental   Comment: Edentulous,     Cardiovascular  Rate: normal,     Pulmonary  Breath sounds clear to auscultation,     Other Findings        Anesthesia Plan  ASA Score- 3     Anesthesia Type- IV sedation with anesthesia with ASA Monitors           Additional Monitors:   Airway Plan:           Plan Factors-Exercise tolerance (METS): <4 METS  Exercise comment: Limited by pain  Chart reviewed  EKG reviewed  Existing labs reviewed  Patient summary reviewed  Patient is not a current smoker  Patient did not smoke on day of surgery  Obstructive sleep apnea risk education given perioperatively  Induction- intravenous  Postoperative Plan-     Informed Consent- Anesthetic plan and risks discussed with patient  I personally reviewed this patient with the CRNA  Discussed and agreed on the Anesthesia Plan with the CRNA  Rodolfo Tang

## 2021-02-09 NOTE — PRE-PROCEDURE INSTRUCTIONS
Pre-Surgery Instructions:  Have you had / have a sore throat? No   have you had / have a cough less than 1 week? no  Have you had / have a fever greater than 100 0 - 100  4? no  Are you experiencing any shortness of breath? No  Pt is from [facility] will need covid on admit       Medication Instructions    amLODIPine (NORVASC) 5 mg tablet Instructed patient per Anesthesia Guidelines   busPIRone (BUSPAR) 5 mg tablet Instructed patient per Anesthesia Guidelines   cetirizine (ZyrTEC) 10 mg tablet Instructed patient per Anesthesia Guidelines   ferrous sulfate 324 (65 Fe) mg Instructed patient per Anesthesia Guidelines  hold on 6/44    folic acid (FOLVITE) 1 mg tablet Instructed patient per Anesthesia Guidelines  hold on 2/10    ipratropium-albuterol (DUO-NEB) 0 5-2 5 mg/3 mL nebulizer solution Instructed patient per Anesthesia Guidelines   Multiple Vitamins-Minerals (multivitamin with minerals) tablet Instructed patient per Anesthesia Guidelines  hold on 2/10    omeprazole (PriLOSEC) 20 mg delayed release capsule Instructed patient per Anesthesia Guidelines   oxybutynin (DITROPAN) 5 mg tablet Instructed patient per Anesthesia Guidelines  hold on 2/10    oxyCODONE (ROXICODONE) 5 mg immediate release tablet Instructed patient per Anesthesia Guidelines   prazosin (MINIPRESS) 2 mg capsule Instructed patient per Anesthesia Guidelines   pregabalin (LYRICA) 150 mg capsule Instructed patient per Anesthesia Guidelines   pregabalin (LYRICA) 225 MG capsule Instructed patient per Anesthesia Guidelines   propranolol (INDERAL) 20 mg tablet Instructed patient per Anesthesia Guidelines   QUEtiapine (SEROquel XR) 300 mg 24 hr tablet Instructed patient per Anesthesia Guidelines   traZODone (DESYREL) 150 mg tablet Instructed patient per Anesthesia Guidelines   Valbenazine Tosylate (Ingrezza) 80 MG CAPS Instructed patient per Anesthesia Guidelines

## 2021-02-09 NOTE — TELEPHONE ENCOUNTER
RESIDENT AT [facility]   PRESCRIBED ANTIBIOTIC AT [facility]  PATIENT HAS OXYCODONE 2 5 MG PO EVERY 4 HOURS --ACCORDING TO MEDICATION LIST  NURSE STATED SHE WAS VERY BUSY HAD 2O PATIENT TO ADMINISTER MEDICATIONS TO AND DID NOT HAVE TIME TO TALK WITH ME ---  OTHER PREOPERATIVE CRITERIA NOT COVERED  NURSE REJI, SAID SHE WAS FORM A AGENCY AND REALLY RACHNA NOT KNOW THE PATIENT      I BARELY GOT HER TO VERIFY PHARMACY

## 2021-02-10 ENCOUNTER — ANESTHESIA (OUTPATIENT)
Dept: PERIOP | Facility: HOSPITAL | Age: 56
End: 2021-02-10
Payer: COMMERCIAL

## 2021-02-10 ENCOUNTER — HOSPITAL ENCOUNTER (OUTPATIENT)
Facility: HOSPITAL | Age: 56
Setting detail: OUTPATIENT SURGERY
Discharge: HOME | End: 2021-02-10
Attending: NEUROLOGICAL SURGERY | Admitting: NEUROLOGICAL SURGERY
Payer: COMMERCIAL

## 2021-02-10 VITALS
WEIGHT: 208 LBS | OXYGEN SATURATION: 100 % | BODY MASS INDEX: 39.27 KG/M2 | HEIGHT: 61 IN | HEART RATE: 62 BPM | TEMPERATURE: 97.3 F | DIASTOLIC BLOOD PRESSURE: 66 MMHG | RESPIRATION RATE: 18 BRPM | SYSTOLIC BLOOD PRESSURE: 110 MMHG

## 2021-02-10 VITALS — HEART RATE: 69 BPM

## 2021-02-10 PROCEDURE — C1787 PATIENT PROGR, NEUROSTIM: HCPCS | Performed by: NEUROLOGICAL SURGERY

## 2021-02-10 PROCEDURE — C1767 GENERATOR, NEURO NON-RECHARG: HCPCS | Performed by: NEUROLOGICAL SURGERY

## 2021-02-10 PROCEDURE — 63685 INS/RPLC SPI NPG/RCVR POCKET: CPT | Performed by: NEUROLOGICAL SURGERY

## 2021-02-10 DEVICE — IMPLANTABLE DEVICE: Type: IMPLANTABLE DEVICE | Site: BUTTOCKS | Status: FUNCTIONAL

## 2021-02-10 RX ORDER — CHLORHEXIDINE GLUCONATE 0.12 MG/ML
15 RINSE ORAL ONCE
Status: DISCONTINUED | OUTPATIENT
Start: 2021-02-10 | End: 2021-02-10

## 2021-02-10 RX ORDER — SODIUM CHLORIDE, SODIUM LACTATE, POTASSIUM CHLORIDE, CALCIUM CHLORIDE 600; 310; 30; 20 MG/100ML; MG/100ML; MG/100ML; MG/100ML
75 INJECTION, SOLUTION INTRAVENOUS CONTINUOUS
Status: DISCONTINUED | OUTPATIENT
Start: 2021-02-10 | End: 2021-02-10 | Stop reason: HOSPADM

## 2021-02-10 RX ORDER — PROPOFOL 10 MG/ML
INJECTION, EMULSION INTRAVENOUS CONTINUOUS PRN
Status: DISCONTINUED | OUTPATIENT
Start: 2021-02-10 | End: 2021-02-10

## 2021-02-10 RX ORDER — SODIUM CHLORIDE, SODIUM LACTATE, POTASSIUM CHLORIDE, CALCIUM CHLORIDE 600; 310; 30; 20 MG/100ML; MG/100ML; MG/100ML; MG/100ML
INJECTION, SOLUTION INTRAVENOUS CONTINUOUS PRN
Status: DISCONTINUED | OUTPATIENT
Start: 2021-02-10 | End: 2021-02-10

## 2021-02-10 RX ORDER — HYDROCODONE BITARTRATE AND ACETAMINOPHEN 5; 325 MG/1; MG/1
1 TABLET ORAL EVERY 4 HOURS PRN
Status: DISCONTINUED | OUTPATIENT
Start: 2021-02-10 | End: 2021-02-10 | Stop reason: HOSPADM

## 2021-02-10 RX ORDER — FENTANYL CITRATE 50 UG/ML
INJECTION, SOLUTION INTRAMUSCULAR; INTRAVENOUS AS NEEDED
Status: DISCONTINUED | OUTPATIENT
Start: 2021-02-10 | End: 2021-02-10

## 2021-02-10 RX ORDER — ONDANSETRON 2 MG/ML
4 INJECTION INTRAMUSCULAR; INTRAVENOUS ONCE AS NEEDED
Status: DISCONTINUED | OUTPATIENT
Start: 2021-02-10 | End: 2021-02-10 | Stop reason: HOSPADM

## 2021-02-10 RX ORDER — CEFAZOLIN SODIUM 2 G/50ML
2000 SOLUTION INTRAVENOUS ONCE
Status: COMPLETED | OUTPATIENT
Start: 2021-02-10 | End: 2021-02-10

## 2021-02-10 RX ORDER — ONDANSETRON 2 MG/ML
4 INJECTION INTRAMUSCULAR; INTRAVENOUS EVERY 4 HOURS PRN
Status: DISCONTINUED | OUTPATIENT
Start: 2021-02-10 | End: 2021-02-10 | Stop reason: HOSPADM

## 2021-02-10 RX ORDER — LIDOCAINE HYDROCHLORIDE AND EPINEPHRINE 10; 10 MG/ML; UG/ML
INJECTION, SOLUTION INFILTRATION; PERINEURAL AS NEEDED
Status: DISCONTINUED | OUTPATIENT
Start: 2021-02-10 | End: 2021-02-10 | Stop reason: HOSPADM

## 2021-02-10 RX ORDER — PROPOFOL 10 MG/ML
INJECTION, EMULSION INTRAVENOUS AS NEEDED
Status: DISCONTINUED | OUTPATIENT
Start: 2021-02-10 | End: 2021-02-10

## 2021-02-10 RX ORDER — HYDROMORPHONE HCL/PF 1 MG/ML
0.5 SYRINGE (ML) INJECTION
Status: DISCONTINUED | OUTPATIENT
Start: 2021-02-10 | End: 2021-02-10 | Stop reason: HOSPADM

## 2021-02-10 RX ORDER — FENTANYL CITRATE/PF 50 MCG/ML
50 SYRINGE (ML) INJECTION
Status: DISCONTINUED | OUTPATIENT
Start: 2021-02-10 | End: 2021-02-10 | Stop reason: HOSPADM

## 2021-02-10 RX ORDER — MIDAZOLAM HYDROCHLORIDE 2 MG/2ML
INJECTION, SOLUTION INTRAMUSCULAR; INTRAVENOUS AS NEEDED
Status: DISCONTINUED | OUTPATIENT
Start: 2021-02-10 | End: 2021-02-10

## 2021-02-10 RX ADMIN — PROPOFOL 30 MG: 10 INJECTION, EMULSION INTRAVENOUS at 08:21

## 2021-02-10 RX ADMIN — FENTANYL CITRATE 50 MCG: 50 INJECTION INTRAMUSCULAR; INTRAVENOUS at 08:20

## 2021-02-10 RX ADMIN — SODIUM CHLORIDE, SODIUM LACTATE, POTASSIUM CHLORIDE, AND CALCIUM CHLORIDE: .6; .31; .03; .02 INJECTION, SOLUTION INTRAVENOUS at 08:13

## 2021-02-10 RX ADMIN — MIDAZOLAM 2 MG: 1 INJECTION INTRAMUSCULAR; INTRAVENOUS at 08:14

## 2021-02-10 RX ADMIN — FENTANYL CITRATE 50 MCG: 50 INJECTION INTRAMUSCULAR; INTRAVENOUS at 08:46

## 2021-02-10 RX ADMIN — PROPOFOL 100 MCG/KG/MIN: 10 INJECTION, EMULSION INTRAVENOUS at 08:21

## 2021-02-10 RX ADMIN — CEFAZOLIN SODIUM 2000 MG: 2 SOLUTION INTRAVENOUS at 08:14

## 2021-02-10 NOTE — ANESTHESIA POSTPROCEDURE EVALUATION
Post-Op Assessment Note    CV Status:  Stable    Pain management: adequate     Mental Status:  Awake and lethargic   Hydration Status:  Stable   PONV Controlled:  None   Airway Patency:  Patent      Post Op Vitals Reviewed: Yes      Staff: CRNA         No complications documented      BP      Temp 97 8 °F (36 6 °C) (02/10/21 0850)    Pulse 72 (02/10/21 0850)   Resp 14 (02/10/21 0850)    SpO2

## 2021-02-10 NOTE — OP NOTE
OPERATIVE REPORT  PATIENT NAME: Leslie Baez    :  1965  MRN: [id number]  Pt Location: BE OR ROOM 06    SURGERY DATE: 2/10/2021    Surgeon(s) and Role:     * Radha Acevedo MD - Primary     * Ramsey Sen PA-C - Assisting    Preop Diagnosis:  Chronic pain syndrome [G89 4]    Post-Op Diagnosis Codes:     * Chronic pain syndrome [G89 4]    Procedure(s) (LRB):  REPLACEMENT IMPLANTABLE PULSE GENERATOR DORSAL SPINAL COLUMN STIMULATOR, RIGHT (Right)    Specimen(s):  * No specimens in log *    Estimated Blood Loss:   Minimal    Drains:  * No LDAs found *    Anesthesia Type:   IV Sedation with Anesthesia    Operative Indications:  Chronic pain syndrome [G89 4]      Operative Findings:  See dictated note  Deluna  Proclaim 5  [id number]    Complications:   None    Procedure and Technique:  The patient was taken to the operative theater and successfully induced under sedation  The patient was positioned lateral right side up  The patients prior incision was marked on the right buttock  Then the patient was prepped and draped in sterile fashion, a timeout was performed  We began by making an incision along the old scar with a #10 Blade  We then used monopolar cautery to dissect down to the generator       We then removed the old generator and this was disconnected from the electrode using the proprietary screwdriver  Then the new generator was brought into the field  The new generator was reconnected with the screwdriver and then the impedances were tested and they were found to be within normal limits  Then we placed the new generator into the pocket and irrigated the wound  We then proceeded to close in layers with 2-0 Vicryl for the deeper tissues and staples for the skin       The patient was then was allowed to awaken from sedation and taken to the recovery area in stable condition  All needle and sponge counts were correct at the end of the procedure      I was present for the entire procedure, A qualified resident physician was not available and A physician assistant was required during the procedure for retraction tissue handling,dissection and suturing    Patient Disposition:  PACU     SIGNATURE: Karen Martinez MD  DATE: February 10, 2021  TIME: 8:47 AM

## 2021-02-10 NOTE — DISCHARGE INSTRUCTIONS
Spinal Cord Stimulator Discharge Instructions    Activity:    1  Do not lift more than 20 pounds for 2 weeks  2  Avoid bending, lifting and twisting for 2 weeks  Surgical incision care:    1  Keep dressings in place for 3 days  2  Keep incisions dry for 3 days  3  May allow clean water to flow over incisions after 3 days  4  Do not immerse the incisions in water for 4 weeks  5  After 3 days, incisions may be left open to air, but should remain clean  6  Do not apply any creams or ointments to the incision, unless otherwise instructed by [facility]  7  Contact office if increasing redness, drainage, pain or swelling around the incisions  Postoperative medication:    1  [facility] will not provide pain medication for the first 2 weeks after surgery as coordinated with your pain specialist    2  If [facility] is providing pain medication, please contact office for questions regarding dosage and modifications  3  If [facility] is not providing pain medication postoperatively, then contact pain specialist for additional instructions and prescriptions  4  No antiplatelet or anticoagulation medication for 2 weeks after surgery, unless otherwise instructed  Please contact [facility] if you have any questions about the effects of any of your medications on blood clotting  5  Do not operate heavy machinery or vehicles while taking sedating medications  6  Finish antibiotics to completion      *Note that it may take some time for the stimulator to improve chronic pain symptoms  Adjustment of the stimulator program can begin 2 weeks after placement  Please contact the stimulator representative if you have any questions regarding programing

## 2021-02-10 NOTE — INTERVAL H&P NOTE
H&P reviewed  After examining the patient I find no changes in the patients condition since the H&P had been written      Vitals:    02/10/21 0716   BP: 123/87   Pulse: 74   Resp: 16   Temp: (!) 96 7 °F (35 9 °C)   SpO2: 96%

## 2021-02-12 NOTE — PROGRESS NOTES
Assessment/Plan: Morbid obesity with BMI of 40 0-44 9, adult ([facility] )  -Discussed options of HealthyCORE-Intensive Lifestyle Intervention Program, Very Low Calorie Diet-VLCD, Conservative Program, Louis-En-Y Gastric Bypass and Vertical Sleeve Gastrectomy and the role of weight loss medications   -Initial weight loss goal of 5-10% weight loss for improved health  -Screening labs: check A1c  LP as ordered  CMP reviewed  Normal TSH, but slightly low T4 when checked in 11/2019  Can recheck, but she will need management with her PCP if abnormal    -Patient is interested in pursuing bariatric surgery   -Avoid phentermine due to questionable hx of stroke and hx of bipolar  Would also likely avoid Wellbutrin for this reason due to risk of lowering seizure threshold if she has residual effects of CVA (memory changes listed in chart, also left sided weakness but may be due to lumbar disc disease)  -Avoid Belviq as she is on duloxetine and sumatriptan  Avoid Contrave as she is on narcotics  -Reviewed that she will likely need clearance by psychiatry prior to surgery    Hypertension  -taking propranolol and amlodipine    Lumbar disc disease with radiculopathy  -sx may improve with weight loss  -on morphine    Major depressive disorder, recurrent episode, moderate degree ([facility] )  -on multiple medications  -continue management with psychiatry    Follow up: surgical seminar    Colonoscopy-states she plans on doing this in April    Goals:  No sugary beverages  At least 64oz of water daily  Can try crystal light, propel, powerade/gatorade zero  Attend seminar or watch online seminar  Recommend checking lab coverage before having labs drawn    Diagnoses and all orders for this visit:    Morbid obesity with BMI of 40 0-44 9, adult ([facility] )  -     Ambulatory referral to Weight Management  -     Hemoglobin A1C; Future  -     TSH, 3rd generation with Free T4 reflex;  Future    Hypertension, unspecified type  -     Hemoglobin A1C; Future  - Patient given Trazodone for insomnia per patient request will continue to follow current POC interventions per policies/protocols. TSH, 3rd generation with Free T4 reflex; Future    Abnormal serum thyroxine (T4) level  -     Hemoglobin A1C; Future  -     TSH, 3rd generation with Free T4 reflex; Future    Hyperglycemia  -     Hemoglobin A1C; Future  -     TSH, 3rd generation with Free T4 reflex; Future    Lumbar disc disease with radiculopathy    Major depressive disorder, recurrent episode, moderate degree (HCC)      Subjective:   Chief Complaint   Patient presents with    Consult     mwm consult     Patient ID: Samantha Fiore  is a 62 y o  female with excess weight/obesity here to pursue weight management  Past Medical History:   Diagnosis Date    Acid reflux     Anxiety     RESOLVED: [id number]    Arthritis     Bipolar 2 disorder (HCC)     FOLLOWS WITH PSYCHIATRIST  CONTINUE LAMOTRIGINE; RESOLVED: [id number]    Depression     Familial tremor     both hands    Fibromyalgia     LAST ASSESSED: [id number]    Hearing aid worn     left ear    [place] (hard of hearing)     left ear    Hypertension     Left-sided weakness     Lower back pain     Memory loss of unknown cause     long and short term    Migraine     Overactive bladder     Panic attack     Post traumatic stress disorder     Seasonal allergies     Stroke [facility])     questionable stroke 2009    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  CONT ASA 81 MG DAILY; RESOLVED: [id number]    Urinary incontinence     Wears dentures     partial lower / full upper    Wears glasses      HPI:  Obesity/Excess Weight:  Severity: Severe  Onset:  States she weighed 130 until 2018 when she had spinal surgery    Modifiers: Diet and Exercise  Contributing factors:  Insufficient Physical Activity  Associated symptoms: increased joint pain, SOB    Goals: 130  Hydration: >4 bottles of water, drinking juice/soda/ice team  Recommended non caloric versions  Alcohol: denies  Tobacco: denies  STOPBANG: sleep study ordered- appt 2/18    The following portions of the patient's history were reviewed and updated as appropriate: allergies, current medications, past family history, past medical history, past social history, past surgical history and problem list     Review of Systems   Constitutional: Negative for chills and fever  HENT: Negative for sore throat  Respiratory: Positive for shortness of breath (attributed to weight, but recommend eval if sx persist or worsen)  Negative for cough  Cardiovascular: Negative for chest pain and palpitations (but heart races with panic attacks)  Gastrointestinal: Positive for constipation  Negative for diarrhea  Genitourinary: Negative for dysuria  Musculoskeletal: Positive for arthralgias and back pain  Skin: Negative for rash  Neurological: Negative for headaches  Psychiatric/Behavioral:        Managed by psychiatry- axelies HI/SI     Objective:    /72 (BP Location: Left arm, Patient Position: Sitting, Cuff Size: Large)   Pulse 78   Temp 98 4 °F (36 9 °C)   Ht 4' 10 47" (1 485 m)   Wt 92 1 kg (203 lb) Comment: with shoes  BMI 41 75 kg/m²     Physical Exam   Nursing note and vitals reviewed  Constitutional   General appearance: Abnormal   well developed and morbidly obese  Eyes No conjunctival pallor  Ears, Nose, Mouth, and Throat Oral mucosa moist    Pulmonary   Respiratory effort: No increased work of breathing or signs of respiratory distress  Auscultation of lungs: Clear to auscultation, equal breath sounds bilaterally, no wheezes, no rales, no rhonci  Cardiovascular   Auscultation of heart: Normal rate and rhythm, normal S1 and S2, without murmurs  Examination of extremities for edema and/or varicosities: 1-2+ edema  Abdomen   Abdomen: Abnormal   The abdomen was obese  Bowel sounds were normal  The abdomen was soft and nontender     Musculoskeletal   Gait and station: ambulates with walker  Psychiatric   Orientation to person, place and time: Normal     Affect: appropriate    ALISON AlegreS also present for the visit

## 2021-02-16 ENCOUNTER — TELEPHONE (OUTPATIENT)
Dept: NEUROSURGERY | Facility: CLINIC | Age: 56
End: 2021-02-16

## 2021-02-16 NOTE — TELEPHONE ENCOUNTER
Spoke with Keegan Ruby at [facility] to see how the patient is doing after surgery  She reports she is doing well overall and denies any incisional issues or fevers  She does continue to have pain and still requires narcotic pain medication but associates this more with her chronic pain  Exlained that programming will take place at her 2 week post op and this may help  Patient is able to ambulate in therapy using rolling walker and complete ADLs  Reviewed incision care and ensured she is showering normally  Directed to monitor the site and call the office if any redness, swelling, drainage, dehiscence of incision, or fever >100 F occurs  Nursing aware to call the office if any concerns or questions may arise  Reminded her of patients upcoming appointments with the date/time/location  Patient was appreciative for the call

## 2021-02-18 ENCOUNTER — TELEMEDICINE (OUTPATIENT)
Dept: GASTROENTEROLOGY | Facility: CLINIC | Age: 56
End: 2021-02-18

## 2021-02-18 VITALS — HEIGHT: 61 IN | WEIGHT: 208 LBS | BODY MASS INDEX: 39.27 KG/M2

## 2021-02-18 DIAGNOSIS — R13.10 DYSPHAGIA, UNSPECIFIED TYPE: ICD-10-CM

## 2021-02-18 DIAGNOSIS — K21.9 GASTROESOPHAGEAL REFLUX DISEASE WITHOUT ESOPHAGITIS: Primary | ICD-10-CM

## 2021-02-18 PROCEDURE — 3008F BODY MASS INDEX DOCD: CPT | Performed by: ORTHOPAEDIC SURGERY

## 2021-02-18 PROCEDURE — NC001 PR NO CHARGE: Performed by: INTERNAL MEDICINE

## 2021-02-23 ENCOUNTER — OFFICE VISIT (OUTPATIENT)
Dept: OBGYN CLINIC | Facility: MEDICAL CENTER | Age: 56
End: 2021-02-23
Payer: COMMERCIAL

## 2021-02-23 VITALS
SYSTOLIC BLOOD PRESSURE: 101 MMHG | HEART RATE: 70 BPM | WEIGHT: 214 LBS | DIASTOLIC BLOOD PRESSURE: 69 MMHG | BODY MASS INDEX: 40.43 KG/M2

## 2021-02-23 DIAGNOSIS — M17.0 BILATERAL PRIMARY OSTEOARTHRITIS OF KNEE: Primary | ICD-10-CM

## 2021-02-23 PROCEDURE — 20610 DRAIN/INJ JOINT/BURSA W/O US: CPT | Performed by: ORTHOPAEDIC SURGERY

## 2021-02-23 PROCEDURE — 99213 OFFICE O/P EST LOW 20 MIN: CPT | Performed by: ORTHOPAEDIC SURGERY

## 2021-02-23 PROCEDURE — 1036F TOBACCO NON-USER: CPT | Performed by: ORTHOPAEDIC SURGERY

## 2021-02-23 RX ORDER — METHYLPREDNISOLONE ACETATE 40 MG/ML
1 INJECTION, SUSPENSION INTRA-ARTICULAR; INTRALESIONAL; INTRAMUSCULAR; SOFT TISSUE
Status: COMPLETED | OUTPATIENT
Start: 2021-02-23 | End: 2021-02-23

## 2021-02-23 RX ORDER — BUPIVACAINE HYDROCHLORIDE 2.5 MG/ML
4 INJECTION, SOLUTION INFILTRATION; PERINEURAL
Status: COMPLETED | OUTPATIENT
Start: 2021-02-23 | End: 2021-02-23

## 2021-02-23 RX ADMIN — BUPIVACAINE HYDROCHLORIDE 4 ML: 2.5 INJECTION, SOLUTION INFILTRATION; PERINEURAL at 10:18

## 2021-02-23 RX ADMIN — METHYLPREDNISOLONE ACETATE 1 ML: 40 INJECTION, SUSPENSION INTRA-ARTICULAR; INTRALESIONAL; INTRAMUSCULAR; SOFT TISSUE at 10:18

## 2021-02-23 NOTE — PROGRESS NOTES
Orthopaedic Surgery Note    CC: Bilateral Knee Pain      Current history:  Ms Tahira Johnson is a 63 yo female presenting for follow up for bilateral knee pain, right worse than left  She is presently living in a facility and reports she is planning to transition to home 3/1/21  She states taht she did have neurosurgery recently and this went fine  She has chino working on diet and has lost some weight but recently gained some weight back  She is planning to have bariatric surgery and then would like to consider TKA  Previous history:  Ms Tahira Johnson is a 63 yo female presenting for follow up for bilateral knee pain, right worse than left  Patient had previously cancelled her right total knee arthroplasty  She was going to get bariatric surgery prior to her total joint  However patient reports that the bariatric surgeon will not operate on her until she is in a different living situation when she moves in March  She would like to proceed with right TKA  She reports persistent right knee pain that is worse in the morning  She is taking morphine bid for her pain  She uses a walker for assistance and sometimes uses a wheelchair  She is currently living at [facility] where she completed a course of PT  Since her last visit patient has been evaluated by bariatric surgery and had a televisit with neurology  Ms Leelee Urbano presents the office for a follow-up evaluation of her bilateral knees with right worse than left  She was previously scheduled to undergo a right total knee arthroplasty but due to her current medical issues this surgery had to be canceled  Patient continues to have significant pain about the bilateral knees  She states the right is worse than the left today  She denies any new injury but states she has been very unsteady on her feet  Following her most recent hospital stay she was discharged to [facility] as she has not been able to ambulate for prolonged periods of time    She states she is also unable to walk up the stairs at her daughter's house where she had been living prior to her most recent hospital stay  She was able to obtain clearance in regards to her spinal stimulator  She is not my with Neurology in regards to her gait dysfunction, tremor, and history of stroke  She will also need to me with her PCP prior to surgical intervention  Patient believes she will be able to do this all within her [facility]  She continues to note significant pain which limits her abilities to ambulate interferes with her activities of daily living  She has been performing physical therapy within the nursing facility but notes this is recently stopped and she is unsure why  She would like to proceed with her previously scheduled for total right knee arthroplasty  Ms Samantha Horton is a 60 y  o female with a history of bilateral knee pain  Patient has been receiving treatment for bilateral knee osteoarthritis  Right greater than left  Patient reports severe constant pain diffusely about bilateral knees  Patient is very distressed about her pain  She states she is not able to complete daily activities independently due to her pain  She notes instability and weakness  Patient has tried bilateral knee steroid injections (last on 5/1/20), bilateral knee visco supplementation injections, and PT with no improvement in her pain  She ambulates with a rollator walker  She has used oral meds - tylenol and NSAIds with minimal help  Previously saw Dr Awilda Osborn and Dr Delvin Hodgson and referred to myself regarding possible R TKA  Of note, history of spinal cord stimulator 3 weeks ago  Prior hx stroke  She does have balance issues as well  Significant back pain        ALLERGIES:  No Known Allergies    CURRENT MEDICATIONS:  Current Outpatient Medications   Medication Sig Dispense Refill    acetaminophen (TYLENOL) 325 mg tablet Take 650 mg by mouth every 6 (six) hours as needed for mild pain      ascorbic acid (VITAMIN C) 500 MG tablet Take 1 tablet (500 mg total) by mouth 2 (two) times a day 60 tablet 1    bisacodyl (DULCOLAX) 5 mg EC tablet Take 5 mg by mouth daily as needed for constipation      busPIRone (BUSPAR) 5 mg tablet TAKE 1 TABLET (5 MG TOTAL) BY MOUTH 3 (THREE) TIMES A DAY 90 tablet 0    cetirizine (ZyrTEC) 10 mg tablet Take 10 mg by mouth as needed       diclofenac sodium (VOLTAREN) 1 % APPLY 4 G TOPICALLY 4 (FOUR) TIMES A  g 0    DULoxetine (CYMBALTA) 30 mg delayed release capsule Take 30 mg by mouth daily      DULoxetine (CYMBALTA) 60 mg delayed release capsule Take 1 capsule (60 mg total) by mouth daily 90 capsule 0    ferrous sulfate 324 (65 Fe) mg Take 1 tablet (324 mg total) by mouth 2 (two) times a day before meals 60 tablet 1    folic acid (FOLVITE) 1 mg tablet Take 1 tablet (1 mg total) by mouth daily 30 tablet 1    hydrOXYzine HCL (ATARAX) 50 mg tablet Take 1 tablet (50 mg total) by mouth daily at bedtime as needed for anxiety (insomnia) (Patient taking differently: Take 25 mg by mouth daily ) 90 tablet 2    HYDROXYZINE PAMOATE PO Take 75 mg by mouth daily at bedtime Taking 75 mg at bed time for anxiety      ipratropium-albuterol (DUO-NEB) 0 5-2 5 mg/3 mL nebulizer solution       lidocaine (LMX) 4 % cream Apply topically as needed for mild pain 30 g 0    morphine (MS CONTIN) 15 mg 12 hr tablet Take 1 tablet (15 mg total) by mouth 2 (two) times a day Hold if sedatedMax Daily Amount: 30 mg 20 tablet 0    Multiple Vitamins-Minerals (multivitamin with minerals) tablet Take 1 tablet by mouth daily 30 tablet 1    ondansetron (ZOFRAN-ODT) 4 mg disintegrating tablet Take 1 tablet (4 mg total) by mouth every 6 (six) hours as needed for nausea or vomiting 20 tablet 0    oxyCODONE (ROXICODONE) 5 mg immediate release tablet Take 2 5 mg by mouth every 4 (four) hours as needed       Polyethylene Glycol 3350 (MIRALAX PO) Take by mouth      prazosin (MINIPRESS) 2 mg capsule TAKE 1 CAPSULE BY MOUTH EVERY DAY 30 capsule 1    pregabalin (LYRICA) 150 mg capsule Take 1 capsule (150 mg total) by mouth 3 (three) times a day 90 capsule 2    pregabalin (LYRICA) 225 MG capsule Take 225 mg by mouth every 12 (twelve) hours       QUEtiapine (SEROquel XR) 300 mg 24 hr tablet Take 1 tablet (300 mg total) by mouth daily at bedtime 60 tablet 1    Sennosides (SENEXON PO) Take by mouth      SIMETHICONE PO Take by mouth as needed       triamcinolone (KENALOG) 0 025 % cream Apply topically 2 (two) times a day 30 g 0    Valbenazine Tosylate (Ingrezza) 80 MG CAPS Take 80 mg by mouth daily      amLODIPine (NORVASC) 5 mg tablet Take 1 tablet (5 mg total) by mouth daily 90 tablet 3    cholecalciferol (VITAMIN D3) 1,000 units tablet Take 1 tablet (1,000 Units total) by mouth daily 90 tablet 5    LORazepam (ATIVAN) 0 5 mg tablet Take 1 tablet (0 5 mg total) by mouth every 8 (eight) hours as needed for anxiety for up to 10 days Hold if sedated (Patient not taking: Reported on 11/27/2020) 20 tablet 0    omeprazole (PriLOSEC) 20 mg delayed release capsule Take 1 capsule (20 mg total) by mouth daily 90 capsule 3    oxybutynin (DITROPAN) 5 mg tablet Take 1 tablet (5 mg total) by mouth 2 (two) times a day 180 tablet 3    propranolol (INDERAL) 20 mg tablet Take 1 tablet (20 mg total) by mouth 2 (two) times a day 180 tablet 3    traZODone (DESYREL) 150 mg tablet Take 1 tablet (150 mg total) by mouth daily at bedtime Hold if sedated (Patient taking differently: Take 200 mg by mouth daily at bedtime Hold if sedated) 30 tablet 0     No current facility-administered medications for this visit  PAST MEDICAL HISTORY  Past Medical History:   Diagnosis Date    Acid reflux     Anxiety     RESOLVED: [id number]    Arthritis     Bipolar 2 disorder (HCC)     FOLLOWS WITH PSYCHIATRIST   CONTINUE LAMOTRIGINE; RESOLVED: [id number]    Depression     Familial tremor     both hands    Fibromyalgia     LAST ASSESSED: [id number]    Hearing aid worn     left ear    [place] (hard of hearing)     left ear    Hypertension     Left-sided weakness     Lower back pain     Memory loss of unknown cause     long and short term    Migraine     Obesity     Overactive bladder     Panic attack     Post traumatic stress disorder     Seasonal allergies     Stroke [facility])     questionable stroke 2009    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    CONT ASA 81 MG DAILY; RESOLVED: [id number]    Urinary incontinence     Wears dentures     partial lower / full upper    Wears glasses        SURGICAL HISTORY  Past Surgical History:   Procedure Laterality Date    BACK SURGERY       SECTION      COLONOSCOPY      RESOLVED: [id number]    EAR SURGERY      EGD      HYSTERECTOMY  2004    MYRINGOTOMY W/ TUBES Left     NECK SURGERY  2019    MI CYSTOURETHROSCOPY N/A 2016    Procedure: CYSTOSCOPY, BOTOX INJECTION;  Surgeon: Tiffany John MD;  Location: [facility];  Service: Gynecology    MI IMPLANT SPINAL NEUROSTIM/ Right 2/10/2021    Procedure: REPLACEMENT IMPLANTABLE PULSE GENERATOR DORSAL SPINAL COLUMN STIMULATOR, RIGHT;  Surgeon: Rachell Britt MD;  [facility]: [facility];  Service: Neurosurgery    MI PERCUT IMPLNT [address] Right 2020    Procedure: INSERTION THORACIC DORSAL COLUMN SPINAL CORD STIMULATOR PERCUTANEOUS W IMPLANTABLE PULSE GENERATOR, RIGHT;  Surgeon: Rachell Britt MD;  Location: [facility];  Service: Neurosurgery    TONSILLECTOMY      TUBAL LIGATION      UPPER GASTROINTESTINAL ENDOSCOPY  2020       FAMILY HISTORY  Family History   Problem Relation Age of Onset    Colon cancer Mother     Alzheimer's disease Father     Stroke Father     Colon cancer Brother     Bipolar disorder Brother     Breast cancer Maternal Aunt     Colon cancer Maternal Aunt     Heart disease Other     Diabetes Other     Hypertension Other     Seizures Son     Depression Paternal Grandfather     No Known Problems Sister     No Known Problems Brother     Thyroid disease Neg Hx        SOCIAL HISTORY  Social History     Socioeconomic History    Marital status:      Spouse name: Not on file    Number of children: 2    Years of education: graduate school     Highest education level: Not on file   Occupational History    Occupation: Disabled   Social Needs    Financial resource strain: Somewhat hard    Food insecurity     Worry: Not on file     Inability: Not on file    Transportation needs     Medical: Not on file     Non-medical: Not on file   Tobacco Use    Smoking status: Never Smoker    Smokeless tobacco: Never Used   Substance and Sexual Activity    Alcohol use: Not Currently     Comment: 2 x year; being a social drinker as per all scripts     Drug use: No    Sexual activity: Not Currently   Lifestyle    Physical activity     Days per week: Not on file     Minutes per session: Not on file    Stress: Not on file   Relationships    Social connections     Talks on phone: Not on file     Gets together: Not on file     Attends Congregation service: Not on file     Active member of club or organization: Not on file     Attends meetings of clubs or organizations: Not on file     Relationship status: Not on file    Intimate partner violence     Fear of current or ex partner: Not on file     Emotionally abused: Not on file     Physically abused: Not on file     Forced sexual activity: Not on file   Other Topics Concern    Not on file   Social History Narrative    Bereavement    Daily caffeine consumption, 6-8 servings per day     as per all scripts    Lives in [place]          Review of Systems   Metal Allergy: no  History of MRSA: no  History of DVT or PE: no  Active dental issues: no  Anesthesia complications: no    Patient indicated positive for hay fever, excessive weight gain, hearing loss, heartburn,     Otherwise negative except per above and HPI     Physical Exam    Vitals  Vitals:    02/23/21 0953   BP: 101/69   Pulse: 70       BMI  Body mass index is 40 43 kg/m²  GENERAL: No acute distress  Alert and oriented  Well nourished and well hydrated  Appears stated age  HEENT : Normocephalic, atraumatic  Extraocular movements intact  Mask in place  NECK: Supple, trachea midline  LUNGS: Adequate and symmetric respiratory effort  No intercostal retractions or accessory muscle use  HEART: Extremities warm and perfused  ABDOMEN: Nondistended  SKIN: Warm and dry, no rash  Imaging  No new imaging  Assessment and Plan  Right Knee Arthritis      - Dasha would be candidate for TKA  At this time, would recommend she settle into her new living situation  She expresses that she would like to proceed with bariatric surgery prior to TKA and I think that this would be reasonable  We will plan for CSI bilateral knees today and she may followup in 3 months  Large joint arthrocentesis: bilateral knee  Universal Protocol:  Consent given by: patient  Time out: Immediately prior to procedure a "time out" was called to verify the correct patient, procedure, equipment, support staff and site/side marked as required  Site marked: the operative site was marked  Supporting Documentation  Indications: pain   Procedure Details  Location: knee - bilateral knee  Needle size: 20 G  Approach: anterior    Medications (Right): 4 mL bupivacaine 0 25 %; 1 mL methylPREDNISolone acetate 40 mg/mLMedications (Left): 4 mL bupivacaine 0 25 %; 1 mL methylPREDNISolone acetate 40 mg/mL   Patient tolerance: patient tolerated the procedure well with no immediate complications  Dressing:  Sterile dressing applied              Ayden Caceres MD  Adult Reconstruction Surgery  Department [address]  9:59 AM

## 2021-02-24 ENCOUNTER — OFFICE VISIT (OUTPATIENT)
Dept: NEUROSURGERY | Facility: CLINIC | Age: 56
End: 2021-02-24

## 2021-02-24 VITALS
SYSTOLIC BLOOD PRESSURE: 158 MMHG | RESPIRATION RATE: 16 BRPM | BODY MASS INDEX: 40.43 KG/M2 | HEIGHT: 61 IN | TEMPERATURE: 98.3 F | HEART RATE: 108 BPM | DIASTOLIC BLOOD PRESSURE: 93 MMHG

## 2021-02-24 DIAGNOSIS — G89.4 CHRONIC PAIN SYNDROME: Primary | ICD-10-CM

## 2021-02-24 DIAGNOSIS — Z96.89 STATUS POST INSERTION OF SPINAL CORD STIMULATOR: ICD-10-CM

## 2021-02-24 PROCEDURE — 99024 POSTOP FOLLOW-UP VISIT: CPT | Performed by: NURSE PRACTITIONER

## 2021-02-24 RX ORDER — FLUORIDE TOOTHPASTE
5 TOOTHPASTE DENTAL DAILY
Status: ON HOLD | COMMUNITY
End: 2021-07-14 | Stop reason: CLARIF

## 2021-02-24 NOTE — ASSESSMENT & PLAN NOTE
· As addressed in hPI  · Presents 2 weeks after surgery for after care status post replacement of right buttock generator  · Right buttock generator site skin closure with staples, removed without incident  · Incision is c/d/i , well approximated incision edges, no dehiscence, no signs of infection  · Refer to photo of incision in media  · SCS on low setting not efficacious  for low back pain relief, after rep worked with SCS and adjusted settings , patient reported feeling stimulation in low back  PLAN;  · Communicate with Rep any concerns regarding spinal cord stimulator issues if cannot reach rep contact office   To assist  · Instructions for continued care documentedin AVS and provided copy

## 2021-02-24 NOTE — PROGRESS NOTES
Assessment/Plan:    Chronic pain syndrome  · As addressed in HPI  · Has an ABBOTT thoracici spinal cord stimulator  · She reports SCS did deliver relief of low back and lower extremity pain approximately 60-65 %, has not helped much since surgery but setting is low  · Met with product rep for device programing and teaching, refer to attached session report  · Reported after adjustment by Rep is feeling stimulation and ease of lower back pain  Plan  · Continue use of SCS  · Continue with pain management  Is prescribed scheduled opiate dosingoxycontin and oxycodone   · Continue Cymbalta  Status post insertion of spinal cord stimulator  · As addressed in hPI  · Presents 2 weeks after surgery for after care status post replacement of right buttock generator  · Right buttock generator site skin closure with staples, removed without incident  · Incision is c/d/i , well approximated incision edges, no dehiscence, no signs of infection  · Refer to photo of incision in media  · SCS on low setting not efficacious  for low back pain relief, after rep worked with SCS and adjusted settings , patient reported feeling stimulation in low back  PLAN;  · Communicate with Rep any concerns regarding spinal cord stimulator issues if cannot reach rep contact office  To assist  · Instructions for continued care documentedin AVS and provided copy        Subjective: 2 week postop vist status post generator replacement    Patient ID: Samantha Saini is a 60 y o  female     HPI   Longstanding history of chronic pain affecting lower back and bilateral lower extremities, has symptoms of post laminectomy syndrome      Failed conservative treatments of injections and therapy    She has undergone a thoracolumbar fusion  Underwent a trial SCS Trial in [place] reporting greater than 50% efficacy for relief of  Chronic pain form chronic pain during trial   She consulted with Dr Kavin Garner 7/6/2020 , after assessment and review was determined and appropriate candidate to proceed with permanent implantation of spinal cord stimulator  &/28/2020 underwent surgery for permanent implantation Thoracici spinal cord stimulator percutaneous with implantable generator right buttock   Office appointment with Dr Lidya Peterson  1/18/2021 , right buttock SCS generator assessed for slight tilt, must recharge in a leaned position, having recharging difficulties  Dr Lidya Peterson agreed to surgery fFor generator replacement to ABBOTT non-rechargeable primary cell generator     Surgery was scheduled for 2/10/2020 REPLACEMENT IMPLANTABLE PULSE GENERATOR DORSAL SPINAL COLUMN STIMULATOR, RIGHT (Right Buttocks)  Impressions and treatment recommendations were discussed in detail with the patient who verbalized understanding, all questions were answered and contact information was given in the event future questions arise  REVIEW OF SYSTEMS  Review of Systems   Constitutional: Negative  HENT: Negative  Eyes: Negative  Respiratory: Negative  Cardiovascular: Negative  Gastrointestinal: Positive for constipation  Endocrine: Negative  Genitourinary: Negative  Musculoskeletal: Positive for arthralgias (knees), back pain (Low back pain into the buttocks and Right  leg) and gait problem (Uses a walker, presents in wheelchair)  Skin: Positive for wound (surgical incisions)  Allergic/Immunologic: Negative  Neurological: Positive for dizziness, weakness (legs), numbness (legs) and headaches  Hematological: Negative  Psychiatric/Behavioral: Positive for sleep disturbance           Meds/Allergies     Current Outpatient Medications   Medication Sig Dispense Refill    acetaminophen (TYLENOL) 325 mg tablet Take 650 mg by mouth every 6 (six) hours as needed for mild pain      amLODIPine (NORVASC) 5 mg tablet Take 1 tablet (5 mg total) by mouth daily 90 tablet 3    ascorbic acid (VITAMIN C) 500 MG tablet Take 1 tablet (500 mg total) by mouth 2 (two) times a day 60 tablet 1    bisacodyl (DULCOLAX) 5 mg EC tablet Take 5 mg by mouth daily as needed for constipation      busPIRone (BUSPAR) 5 mg tablet TAKE 1 TABLET (5 MG TOTAL) BY MOUTH 3 (THREE) TIMES A DAY (Patient taking differently: Take 15 mg by mouth 3 (three) times a day ) 90 tablet 0    cetirizine (ZyrTEC) 10 mg tablet Take 10 mg by mouth as needed       cholecalciferol (VITAMIN D3) 1,000 units tablet Take 1 tablet (1,000 Units total) by mouth daily 90 tablet 5    diclofenac sodium (VOLTAREN) 1 % APPLY 4 G TOPICALLY 4 (FOUR) TIMES A DAY (Patient taking differently: Apply 4 g topically 2 (two) times a day ) 100 g 0    DULoxetine (CYMBALTA) 30 mg delayed release capsule Take 30 mg by mouth daily      DULoxetine (CYMBALTA) 60 mg delayed release capsule Take 1 capsule (60 mg total) by mouth daily 90 capsule 0    ferrous sulfate 324 (65 Fe) mg Take 1 tablet (324 mg total) by mouth 2 (two) times a day before meals 60 tablet 1    folic acid (FOLVITE) 1 mg tablet Take 1 tablet (1 mg total) by mouth daily 30 tablet 1    hydrOXYzine HCL (ATARAX) 50 mg tablet Take 1 tablet (50 mg total) by mouth daily at bedtime as needed for anxiety (insomnia) (Patient taking differently: Take 50 mg by mouth daily ) 90 tablet 2    ipratropium-albuterol (DUO-NEB) 0 5-2 5 mg/3 mL nebulizer solution       morphine (MS CONTIN) 15 mg 12 hr tablet Take 1 tablet (15 mg total) by mouth 2 (two) times a day Hold if sedatedMax Daily Amount: 30 mg 20 tablet 0    Mouthwashes (Biotene Dry Mouth) LIQD Apply 5 mL to the mouth or throat daily      Multiple Vitamins-Minerals (multivitamin with minerals) tablet Take 1 tablet by mouth daily 30 tablet 1    omeprazole (PriLOSEC) 20 mg delayed release capsule Take 1 capsule (20 mg total) by mouth daily (Patient taking differently: Take 20 mg by mouth 2 (two) times a day ) 90 capsule 3    oxybutynin (DITROPAN) 5 mg tablet Take 1 tablet (5 mg total) by mouth 2 (two) times a day 180 tablet 3  oxyCODONE (ROXICODONE) 5 mg immediate release tablet Take 2 5 mg by mouth every 4 (four) hours as needed       Polyethylene Glycol 3350 (MIRALAX PO) Take by mouth      prazosin (MINIPRESS) 2 mg capsule TAKE 1 CAPSULE BY MOUTH EVERY DAY 30 capsule 1    pregabalin (LYRICA) 225 MG capsule Take 225 mg by mouth every 12 (twelve) hours       propranolol (INDERAL) 20 mg tablet Take 1 tablet (20 mg total) by mouth 2 (two) times a day 180 tablet 3    QUEtiapine (SEROquel XR) 300 mg 24 hr tablet Take 1 tablet (300 mg total) by mouth daily at bedtime 60 tablet 1    Sennosides (SENEXON PO) Take by mouth      SIMETHICONE PO Take by mouth as needed       sodium chloride (OCEAN) 0 65 % nasal spray 2 sprays into each nostril as needed      traZODone (DESYREL) 150 mg tablet Take 1 tablet (150 mg total) by mouth daily at bedtime Hold if sedated (Patient taking differently: Take 200 mg by mouth daily at bedtime Hold if sedated) 30 tablet 0    triamcinolone (KENALOG) 0 025 % cream Apply topically 2 (two) times a day 30 g 0    Valbenazine Tosylate (Ingrezza) 80 MG CAPS Take 80 mg by mouth daily      HYDROXYZINE PAMOATE PO Take 75 mg by mouth daily at bedtime Taking 75 mg at bed time for anxiety      lidocaine (LMX) 4 % cream Apply topically as needed for mild pain (Patient not taking: Reported on 2/24/2021) 30 g 0    LORazepam (ATIVAN) 0 5 mg tablet Take 1 tablet (0 5 mg total) by mouth every 8 (eight) hours as needed for anxiety for up to 10 days Hold if sedated (Patient not taking: Reported on 11/27/2020) 20 tablet 0    ondansetron (ZOFRAN-ODT) 4 mg disintegrating tablet Take 1 tablet (4 mg total) by mouth every 6 (six) hours as needed for nausea or vomiting (Patient not taking: Reported on 2/24/2021) 20 tablet 0    pregabalin (LYRICA) 150 mg capsule Take 1 capsule (150 mg total) by mouth 3 (three) times a day 90 capsule 2     No current facility-administered medications for this visit          No Known Allergies    PAST HISTORY    Past Medical History:   Diagnosis Date    Acid reflux     Anxiety     RESOLVED: [id number]    Arthritis     Bipolar 2 disorder (HCC)     FOLLOWS WITH PSYCHIATRIST  CONTINUE LAMOTRIGINE; RESOLVED: [id number]    Depression     Familial tremor     both hands    Fibromyalgia     LAST ASSESSED: [id number]    Hearing aid worn     left ear    [place] (hard of hearing)     left ear    Hypertension     Left-sided weakness     Lower back pain     Memory loss of unknown cause     long and short term    Migraine     Obesity     Overactive bladder     Panic attack     Post traumatic stress disorder     Seasonal allergies     Stroke [facility])     questionable stroke 2009    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    CONT ASA 81 MG DAILY; RESOLVED: [id number]    Urinary incontinence     Wears dentures     partial lower / full upper    Wears glasses        Past Surgical History:   Procedure Laterality Date    BACK SURGERY       SECTION      COLONOSCOPY      RESOLVED: [id number]    EAR SURGERY      EGD      HYSTERECTOMY      MYRINGOTOMY W/ TUBES Left     NECK SURGERY  2019    HI CYSTOURETHROSCOPY N/A 2016    Procedure: CYSTOSCOPY, BOTOX INJECTION;  Surgeon: Ronak Morales MD;  Location: [facility];  Service: Gynecology    HI IMPLANT SPINAL NEUROSTIM/ Right 2/10/2021    Procedure: REPLACEMENT IMPLANTABLE PULSE GENERATOR DORSAL SPINAL COLUMN STIMULATOR, RIGHT;  Surgeon: Randal Lu MD;  [facility];  Service: Neurosurgery    HI PERCUT IMPLNT [address] Right 2020    Procedure: INSERTION THORACIC DORSAL COLUMN SPINAL CORD STIMULATOR PERCUTANEOUS W IMPLANTABLE PULSE GENERATOR, RIGHT;  Surgeon: Randal Lu MD;  Location:  MAIN OR;  Service: Neurosurgery    [address] [place] [address]    UPPER GASTROINTESTINAL ENDOSCOPY  2020       Social History     Tobacco Use    Smoking status: Never Smoker    Smokeless tobacco: Never Used   Substance Use Topics    Alcohol use: Not Currently     Comment: 2 x year; being a social drinker as per all scripts     Drug use: No       Family History   Problem Relation Age of Onset    Colon cancer Mother     Alzheimer's disease Father     Stroke Father     Colon cancer Brother     Bipolar disorder Brother     Breast cancer Maternal Aunt     Colon cancer Maternal Aunt     Heart disease Other     Diabetes Other     Hypertension Other     Seizures Son     Depression Paternal Grandfather     No Known Problems Sister     No Known Problems Brother     Thyroid disease Neg Hx        The following portions of the patient's history were reviewed and updated as appropriate: allergies, current medications, past family history, past medical history, past social history, past surgical history and problem list       EXAM    Vitals:Blood pressure 158/93, pulse (!) 108, temperature 98 3 °F (36 8 °C), temperature source Tympanic, resp  rate 16, height 5' 1" (1 549 m), not currently breastfeeding  ,Body mass index is 40 43 kg/m²  Physical Exam  Vitals signs and nursing note reviewed  Exam conducted with a chaperone present ([facility] staff )  Constitutional:       General: She is not in acute distress  Appearance: Normal appearance  She is obese  She is not ill-appearing, toxic-appearing or diaphoretic  Comments: Well groomed   Eyes:      General: No scleral icterus  Right eye: No discharge  Left eye: No discharge  Musculoskeletal:      Comments: Limitation lumbar ROM leaning forward posture   Skin:     General: Skin is warm and dry  Neurological:      Mental Status: She is alert and oriented to person, place, and time  Psychiatric:         Mood and Affect: Mood normal          Behavior: Behavior normal          Neurologic Exam     Mental Status   Oriented to person, place, and time     Level of consciousness: alert    Motor Exam     Strength Right quadriceps: 5/5  Left quadriceps: 5/5  Right hamstrin/5  Left hamstrin/5  Right anterior tibial: 5/5  Left anterior tibial: 5/5  Right gastroc: 5/5  Left gastroc: 55    Gait, Coordination, and Reflexes     Gait  Gait: (wheelchair mobility in rehab at [facility])      Imaging Studies  No results found

## 2021-02-24 NOTE — ASSESSMENT & PLAN NOTE
· As addressed in HPI  · Has an ABBOTT thoracici spinal cord stimulator  · She reports SCS did deliver relief of low back and lower extremity pain approximately 60-65 %, has not helped much since surgery but setting is low  · Met with product rep for device programing and teaching, refer to attached session report  · Reported after adjustment by Rep is feeling stimulation and ease of lower back pain  Plan  · Continue use of SCS  · Continue with pain management  Is prescribed scheduled opiate dosingoxycontin and oxycodone   · Continue Cymbalta

## 2021-02-24 NOTE — PATIENT INSTRUCTIONS
Instructions for continued care at 2 weeks postop thru 6 weeks postop  · At 2 weeks postop can resume restricted medications such as ASA, products containing ASA, NSAID, fish oils, and OTC products or as previously directed  · Resume driving 2 week postoperatively  · Continue to observe incisions for redness, drainage, swelling dehiscence, increased pain, fever >/= 101, warmth to touch incision or skin surrounding, if occur call or report to office immediately for reassessment  · Continue showers using clean towel and wash cloth with OTC antibacterial body wash, pat dry after showering continue protocol for an additional 2 weeks  Do not rib incision, allow water to briefly run over incision while rinsing soap away , pat dry with clean towel  · Do not apply lotions, creams, powder, or ointments to surgical incisions  · Activity as tolerated, no bending, lifting  greater than 10 lbs, turning, stretching, no pulling, pushing  ambulation as tolerated  · Refrain from strenuous activity, bending, and  twisting back  · No Immersion in water such as swimming, hot tub, or tub bath  · If  there is any significant change condition  call and/or return to the office immediately for reassessment  · Met with product rep for device programing and teaching, refer to attached session report  · Explained chronic pain relief may not be immediate after reprogramming can take  Up to 72 hours to feel effect   · Contact GUDINO  Rep immediately if there is any change in efficacy or concern over SCS system     · Contact office if unable to reach Rep or if the reps   intervention does  not resolve issue  ·

## 2021-03-01 ENCOUNTER — ANTICOAG VISIT (OUTPATIENT)
Dept: INTERNAL MEDICINE CLINIC | Facility: CLINIC | Age: 56
End: 2021-03-01

## 2021-03-01 NOTE — PROGRESS NOTES
HIRAM received call from Nicholas Campbell @ [facility] re pt who is to be discharged to her dgt's home soon @  [address] [facility]   She reports pt will have [address] orders and will be re-referred to [facility] and to an [facility]   Mikhail Arreola did not know with which programs at this time  SW did share which providers pt had previously  SW also did share the pt previously was not able to have services at her dgt's home  SW had been attempting to help her get a room at the [facility] prior to her SNF admission  Mikhail Arreola reports that timur pt is using a Rolator walker but has been independent in her ADLs  [place]   She is able to bathe and dress herself and is independent in transfers  Pt will also have a wheelchair  Pt is schedule on 3/9/21  SW will attempt to see pt then for help with community resources at that time

## 2021-03-09 ENCOUNTER — TELEPHONE (OUTPATIENT)
Dept: NEUROSURGERY | Facility: CLINIC | Age: 56
End: 2021-03-09

## 2021-03-09 ENCOUNTER — PATIENT OUTREACH (OUTPATIENT)
Dept: INTERNAL MEDICINE CLINIC | Facility: CLINIC | Age: 56
End: 2021-03-09

## 2021-03-09 ENCOUNTER — OFFICE VISIT (OUTPATIENT)
Dept: INTERNAL MEDICINE CLINIC | Facility: CLINIC | Age: 56
End: 2021-03-09

## 2021-03-09 VITALS
WEIGHT: 212 LBS | HEART RATE: 98 BPM | DIASTOLIC BLOOD PRESSURE: 70 MMHG | BODY MASS INDEX: 40.06 KG/M2 | TEMPERATURE: 98.4 F | SYSTOLIC BLOOD PRESSURE: 110 MMHG

## 2021-03-09 DIAGNOSIS — G89.4 CHRONIC PAIN DISORDER: ICD-10-CM

## 2021-03-09 DIAGNOSIS — M54.16 LUMBAR RADICULOPATHY: ICD-10-CM

## 2021-03-09 DIAGNOSIS — R26.2 AMBULATORY DYSFUNCTION: ICD-10-CM

## 2021-03-09 DIAGNOSIS — E66.01 MORBID OBESITY (HCC): Primary | ICD-10-CM

## 2021-03-09 DIAGNOSIS — Z71.89 COMPLEX CARE COORDINATION: Primary | ICD-10-CM

## 2021-03-09 PROCEDURE — 99496 TRANSJ CARE MGMT HIGH F2F 7D: CPT | Performed by: INTERNAL MEDICINE

## 2021-03-09 RX ORDER — ACETAMINOPHEN 325 MG/1
650 TABLET ORAL EVERY 8 HOURS PRN
Qty: 60 TABLET | Refills: 2 | Status: SHIPPED | OUTPATIENT
Start: 2021-03-09 | End: 2021-03-09

## 2021-03-09 RX ORDER — MORPHINE SULFATE 15 MG/1
15 TABLET, FILM COATED, EXTENDED RELEASE ORAL 2 TIMES DAILY
Qty: 30 TABLET | Refills: 0 | Status: SHIPPED | OUTPATIENT
Start: 2021-03-09 | End: 2021-03-29 | Stop reason: SDUPTHER

## 2021-03-09 RX ORDER — OXYCODONE HYDROCHLORIDE 5 MG/1
5 TABLET ORAL DAILY
Qty: 15 TABLET | Refills: 0 | Status: SHIPPED | OUTPATIENT
Start: 2021-03-09 | End: 2021-03-28 | Stop reason: SDUPTHER

## 2021-03-09 RX ORDER — ACETAMINOPHEN 325 MG/1
650 TABLET ORAL EVERY 8 HOURS PRN
Qty: 60 TABLET | Refills: 2 | Status: SHIPPED | OUTPATIENT
Start: 2021-03-09 | End: 2021-08-05 | Stop reason: HOSPADM

## 2021-03-09 RX ORDER — PREGABALIN 225 MG/1
225 CAPSULE ORAL EVERY 12 HOURS
Qty: 60 CAPSULE | Refills: 0 | Status: SHIPPED | OUTPATIENT
Start: 2021-03-09 | End: 2021-04-29

## 2021-03-09 NOTE — PROGRESS NOTES
Outpatient Care Management Note:    Patient is at PCP office today for transition of care visit after being discharged from [facility] where she resided for several months  , Aleksandar Reyes and I met with patient  She got here by transportation through her insurance  She report she is living at her son's house at [address] in [facility] [place] [facility], [address]  She reports she has a 1st floor setup with access to kitchen and bathroom and no steps to do  Patient reports she is independent with bathing and getting dressed  She reports she is able to cook but her son and daughter to a lot of cooking for her and has microwaved meals  Spoke with patient at [employer] and will think about it at this time  Did talk about [place] waiver program and patient is declining at this time  Patient reports her son works during the day and is with her at night and her daughter will be coming a couple times a week during the day to help her and check on her  Patient denies any falls since being discharged from [facility]  Patient reports she has a shower chair that she needs to get from her daughters house to her son's house  Patient reports she has a walker and wheelchair that she left at [facility] because it would not fit in the vehicle home that her daughter is working on getting for her  Patient using wheelchair from downstairs lob today  Patient does report her son does not drive  Patient reports she does not have any VNA services setup at this time  She would be interested in nursing and physical therapy but needs to get things organized where she is living first      Patient reports she is going to be following up with [facility] office to pursue weightloss surgery and then plans to return to the orthopedic office for evaluation of knee replacement  Patient reports she feels she can manage her medication on her own  She would like to continue to use [facility] pharmacy   I did bring up about bubble packing of medication but she is declining this at this time  Patient reports she is planning on getting a pill box to use  Patient made aware can get this at [employer] store or pharmacy  Patient did not bring any of her medication today  She does report she discussed her pain medication with the doctor today  Scripts for lyrica, oxycodone, morphine and tylenol sent to pharmacy today  Patient agreeable to further outreach from me later this week and reviewing what medication she has currently at home  Patient has follow up appointment scheduled for 4/13 with Dr Jesi Mendes  Patient had to leave due to transportation being here to pick her up  Patient does not have any further questions, concerns, or other needs at this time  Pt has my contact # and PCP office # if needed  Pt is agreeable for further outreach  Please see physician note and  note for more information

## 2021-03-09 NOTE — TELEPHONE ENCOUNTER
Returned call to patient questions if she can start tub baths  Reinforced teaching pre-op and at 2 week visit no tub baths fir 6 weeks after surgery   Do not want to soak incision , will soften scar this can result in maceration and incision opening  She recalled  2 wk post op instruction  Verbalized and understanding and agreement to comply

## 2021-03-09 NOTE — PROGRESS NOTES
[facility]  INTERNAL MEDICINE TCM VISIT     PATIENT INFORMATION     Samantha Goodman   60 y o  female   MRN: [id number]    ASSESSMENT/PLAN     Diagnoses and all orders for this visit:    Transition of Care  Patient unable to recall any of the medications she is currently taking  Advised her of importance in calling office to update med list when she gets home  Will need to establish regular follow up for general check ups    Morbid obesity ([address] )  Discussed patient's weight  Advised weight loss would likely improve overall pain  Patient requesting referral to bariatric surgery  BMI Counseling: Body mass index is 40 06 kg/m²  The BMI is above normal  Nutrition recommendations include reducing portion sizes, decreasing overall calorie intake, 3-5 servings of fruits/vegetables daily and consuming healthier snacks  Exercise recommendations include exercising 3-5 times per week  Patient referred to bariatric surgery due to patient being morbidly obese  -     Ambulatory referral to Bariatric Surgery; Future    Ambulatory dysfunction  Patient with chronic pain in legs and back leading to ambulatory dysfunction  Was admitted to [facility] rehab from September 2020 to March 4, 2020  Was utilizing wheelchair and walker while there, but unable to transport them to her home  Feels she would benefit from these assistive devices in her home  Staying with her son in first floor apartment without stairs  · Alisha Fraser RN and HIRAM Otto following patient  · Patient would like referral to VNA for nursing and home PT, but would like to wait to arrange until apartment rearranged    · Patient with chronic pain leading to ambulatory dysfunction - pain regimen as below  · Was scheduled for R knee replacement - postponed due to admission to [facility]  · Orthopedic surgery recommended following through with bariatric surgery prior to proceeding with joint replacements  -     Ambulatory Referral to Home Health; Future  -     acetaminophen (TYLENOL) 325 mg tablet; Take 2 tablets (650 mg total) by mouth every 8 (eight) hours as needed for mild pain    Chronic pain syndrome  Lumbar radiculopathy  Patient receiving Oxycodone and Morphine while at [facility]  Previously prescribed Morphine 15mg Q12 hours by our office  S/P Spinal Cord Stimulator which patient reports has improved back pain  · Will fill Morphine 15mg Q12 hours - 15 day supply at a time  · Will give patient Oxycodone 5mg daily - 15 pills total - with goal of tapering off this medication  · Will also fill patient's Lyrica prescription  · Advised patient of importance of calling in other medications to be sure no meds that interact with these  · Discussed that she will only be able to receive 2 week supplies of medications at a time  · T/C referral to pain management in the future - patient previously followed with pain management prior to her rehab stays  -     Ambulatory Referral to [address] [place] [address] Alfredo Figueroa; Future    Recommend 1 month follow up with PCP for full med rec, address chronic medical issues, and discuss health maintenance    HISTORY OF PRESENT ILLNESS     Reason for recent hospitalization: Right knee pain    TCM Call (since 2/6/2021)     None      TCM Call (since 2/6/2021)     None      No TCM call as patient was in rehab from date of discharge in 9/2020 until last week  Ms Cristina Horton is a 56yo F with PMH significant for obstructive sleep apnea, chronic pain/fibromyalgia on morphine/oxycodone, lumbar radiculopathy s/p spinal cord stimulator, osteoarthritis, GERD, hypertension, migraine, obesity, PTSD, Bipolar, and anxiety who presents for TCM visit after discharge from [address]  She reports that she was admitted there after hospitalization for R knee pain  She was scheduled to undergo R knee replacement in October of 2020, but this surgery was postponed due to admission to skilled nursing facility       Patient unsure of any medications that she is currently taking, but requests refills on current pain medications including Morphine, Oxycodone, Tylenol, and Lyrica  She reports that she was receiving these medications from our office prior to admission to SNF, and has now run out of rx that they gave her at time of discharge  She continues to complain of chronic pain, and reports that she would like to proceed with weightloss surgery to help with back and knee pain  She would then like to return to orthopedic surgery for evaluation for knee replacement  She also reports that she would like VNA and home PT once her home arrangements are sorted out  Patient needs home wheelchair and walker  States she was using these at [facility], but unfortunately her daughter could not fit them in her car and was unable to transport them home with her  She is currently staying with her son in a first floor apartment  Reports that she has her own bedroom  She is able to get around by leaning on things, and denies falls, but reports she was able to function much better with walker/wheelchair and she would like to have them at home  REVIEW OF SYSTEMS     Review of Systems   Constitutional: Positive for activity change and fatigue  Negative for appetite change, chills and fever  HENT: Negative for rhinorrhea, sneezing, sore throat and trouble swallowing  Eyes: Negative for pain and visual disturbance  Respiratory: Negative for cough, shortness of breath and wheezing  Cardiovascular: Negative for chest pain, palpitations and leg swelling  Gastrointestinal: Negative for abdominal pain, constipation, diarrhea, nausea and vomiting  Endocrine: Negative for polydipsia and polyuria  Genitourinary: Negative for difficulty urinating, dysuria and hematuria  Musculoskeletal: Positive for arthralgias, back pain, gait problem and myalgias  Skin: Negative for color change and rash  Neurological: Positive for weakness   Negative for dizziness, syncope and light-headedness  Psychiatric/Behavioral: Negative for confusion  The patient is not nervous/anxious  OBJECTIVE     Vitals:    03/09/21 1255 03/09/21 1325   BP: 110/70    BP Location: Right arm    Patient Position: Sitting    Cuff Size: Large    Pulse: (!) 118 98   Temp: 98 4 °F (36 9 °C)    TempSrc: Temporal    Weight: 96 2 kg (212 lb)      Physical Exam  Vitals signs reviewed  Constitutional:       General: She is not in acute distress  Appearance: Normal appearance  She is obese  She is not ill-appearing, toxic-appearing or diaphoretic  Comments: Evaluated in office wheelchair, patient utilized walker and wheelchair at nursing home but unable to transport them to her current residence   HENT:      Head: Normocephalic and atraumatic  Right Ear: External ear normal       Left Ear: External ear normal       Nose: Nose normal       Mouth/Throat:      Mouth: Mucous membranes are moist       Pharynx: Oropharynx is clear  Eyes:      General:         Right eye: No discharge  Left eye: No discharge  Conjunctiva/sclera: Conjunctivae normal    Neck:      Musculoskeletal: Normal range of motion  No muscular tenderness  Cardiovascular:      Rate and Rhythm: Normal rate and regular rhythm  Heart sounds: Normal heart sounds  Pulmonary:      Effort: Pulmonary effort is normal  No respiratory distress  Breath sounds: Normal breath sounds  Abdominal:      General: Bowel sounds are normal  There is no distension  Palpations: Abdomen is soft  Tenderness: There is no abdominal tenderness  Musculoskeletal:         General: Tenderness present  Right lower leg: Edema present  Left lower leg: Edema present  Comments: Decreased range of motion in spine and lower extremities, chronic back and lower extremity pain   Skin:     General: Skin is warm and dry  Neurological:      General: No focal deficit present        Mental Status: She is alert and oriented to person, place, and time  Motor: Weakness present  Psychiatric:         Mood and Affect: Mood normal          Behavior: Behavior normal          Thought Content:  Thought content normal        CURRENT MEDICATIONS     Current Outpatient Medications:     acetaminophen (TYLENOL) 325 mg tablet, Take 2 tablets (650 mg total) by mouth every 8 (eight) hours as needed for mild pain, Disp: 60 tablet, Rfl: 2    amLODIPine (NORVASC) 5 mg tablet, Take 1 tablet (5 mg total) by mouth daily, Disp: 90 tablet, Rfl: 3    ascorbic acid (VITAMIN C) 500 MG tablet, Take 1 tablet (500 mg total) by mouth 2 (two) times a day, Disp: 60 tablet, Rfl: 1    bisacodyl (DULCOLAX) 5 mg EC tablet, Take 5 mg by mouth daily as needed for constipation, Disp: , Rfl:     busPIRone (BUSPAR) 5 mg tablet, TAKE 1 TABLET (5 MG TOTAL) BY MOUTH 3 (THREE) TIMES A DAY (Patient taking differently: Take 15 mg by mouth 3 (three) times a day ), Disp: 90 tablet, Rfl: 0    cetirizine (ZyrTEC) 10 mg tablet, Take 10 mg by mouth as needed , Disp: , Rfl:     cholecalciferol (VITAMIN D3) 1,000 units tablet, Take 1 tablet (1,000 Units total) by mouth daily, Disp: 90 tablet, Rfl: 5    diclofenac sodium (VOLTAREN) 1 %, APPLY 4 G TOPICALLY 4 (FOUR) TIMES A DAY (Patient taking differently: Apply 4 g topically 2 (two) times a day ), Disp: 100 g, Rfl: 0    DULoxetine (CYMBALTA) 30 mg delayed release capsule, Take 30 mg by mouth daily, Disp: , Rfl:     DULoxetine (CYMBALTA) 60 mg delayed release capsule, Take 1 capsule (60 mg total) by mouth daily, Disp: 90 capsule, Rfl: 0    ferrous sulfate 324 (65 Fe) mg, Take 1 tablet (324 mg total) by mouth 2 (two) times a day before meals, Disp: 60 tablet, Rfl: 1    folic acid (FOLVITE) 1 mg tablet, Take 1 tablet (1 mg total) by mouth daily, Disp: 30 tablet, Rfl: 1    hydrOXYzine HCL (ATARAX) 50 mg tablet, Take 1 tablet (50 mg total) by mouth daily at bedtime as needed for anxiety (insomnia) (Patient taking differently: Take 50 mg by mouth daily ), Disp: 90 tablet, Rfl: 2    HYDROXYZINE PAMOATE PO, Take 75 mg by mouth daily at bedtime Taking 75 mg at bed time for anxiety, Disp: , Rfl:     ipratropium-albuterol (DUO-NEB) 0 5-2 5 mg/3 mL nebulizer solution, , Disp: , Rfl:     lidocaine (LMX) 4 % cream, Apply topically as needed for mild pain (Patient not taking: Reported on 2/24/2021), Disp: 30 g, Rfl: 0    LORazepam (ATIVAN) 0 5 mg tablet, Take 1 tablet (0 5 mg total) by mouth every 8 (eight) hours as needed for anxiety for up to 10 days Hold if sedated (Patient not taking: Reported on 11/27/2020), Disp: 20 tablet, Rfl: 0    morphine (MS CONTIN) 15 mg 12 hr tablet, Take 1 tablet (15 mg total) by mouth 2 (two) times a day Hold if sedatedMax Daily Amount: 30 mg, Disp: 30 tablet, Rfl: 0    Mouthwashes (Biotene Dry Mouth) LIQD, Apply 5 mL to the mouth or throat daily, Disp: , Rfl:     Multiple Vitamins-Minerals (multivitamin with minerals) tablet, Take 1 tablet by mouth daily, Disp: 30 tablet, Rfl: 1    omeprazole (PriLOSEC) 20 mg delayed release capsule, Take 1 capsule (20 mg total) by mouth daily (Patient taking differently: Take 20 mg by mouth 2 (two) times a day ), Disp: 90 capsule, Rfl: 3    ondansetron (ZOFRAN-ODT) 4 mg disintegrating tablet, Take 1 tablet (4 mg total) by mouth every 6 (six) hours as needed for nausea or vomiting (Patient not taking: Reported on 2/24/2021), Disp: 20 tablet, Rfl: 0    oxybutynin (DITROPAN) 5 mg tablet, Take 1 tablet (5 mg total) by mouth 2 (two) times a day, Disp: 180 tablet, Rfl: 3    oxyCODONE (ROXICODONE) 5 mg immediate release tablet, Take 1 tablet (5 mg total) by mouth dailyMax Daily Amount: 5 mg, Disp: 15 tablet, Rfl: 0    Polyethylene Glycol 3350 (MIRALAX PO), Take by mouth, Disp: , Rfl:     prazosin (MINIPRESS) 2 mg capsule, TAKE 1 CAPSULE BY MOUTH EVERY DAY, Disp: 30 capsule, Rfl: 1    pregabalin (LYRICA) 150 mg capsule, Take 1 capsule (150 mg total) by mouth 3 (three) times a day, Disp: 90 capsule, Rfl: 2    pregabalin (LYRICA) 225 MG capsule, Take 1 capsule (225 mg total) by mouth every 12 (twelve) hours, Disp: 60 capsule, Rfl: 0    propranolol (INDERAL) 20 mg tablet, Take 1 tablet (20 mg total) by mouth 2 (two) times a day, Disp: 180 tablet, Rfl: 3    QUEtiapine (SEROquel XR) 300 mg 24 hr tablet, Take 1 tablet (300 mg total) by mouth daily at bedtime, Disp: 60 tablet, Rfl: 1    Sennosides (SENEXON PO), Take by mouth, Disp: , Rfl:     SIMETHICONE PO, Take by mouth as needed , Disp: , Rfl:     sodium chloride (OCEAN) 0 65 % nasal spray, 2 sprays into each nostril as needed, Disp: , Rfl:     traZODone (DESYREL) 150 mg tablet, Take 1 tablet (150 mg total) by mouth daily at bedtime Hold if sedated (Patient taking differently: Take 200 mg by mouth daily at bedtime Hold if sedated), Disp: 30 tablet, Rfl: 0    triamcinolone (KENALOG) 0 025 % cream, Apply topically 2 (two) times a day, Disp: 30 g, Rfl: 0    Valbenazine Tosylate (Ingrezza) 80 MG CAPS, Take 80 mg by mouth daily, Disp: , Rfl:     HISTORICAL INFORMATION     Past Medical History:   Diagnosis Date    Acid reflux     Anxiety     RESOLVED: [id number]    Arthritis     Bipolar 2 disorder (HCC)     FOLLOWS WITH PSYCHIATRIST  CONTINUE LAMOTRIGINE; RESOLVED: [id number]    Depression     Familial tremor     both hands    Fibromyalgia     LAST ASSESSED: [id number]    Hearing aid worn     left ear    [place] (hard of hearing)     left ear    Hypertension     Left-sided weakness     Lower back pain     Memory loss of unknown cause     long and short term    Migraine     Obesity     Overactive bladder     Panic attack     Post traumatic stress disorder     Seasonal allergies     Stroke [facility])     questionable stroke 2009    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    CONT ASA 81 MG DAILY; RESOLVED: [id number]    Urinary incontinence     Wears dentures     partial lower / full upper    Wears glasses      Past Surgical History:   Procedure Laterality Date    BACK SURGERY       SECTION      COLONOSCOPY      RESOLVED: [id number]    EAR SURGERY      EGD      HYSTERECTOMY      MYRINGOTOMY W/ TUBES Left     NECK SURGERY  2019    MD CYSTOURETHROSCOPY N/A 2016    Procedure: CYSTOSCOPY, BOTOX INJECTION;  Surgeon: Samina Cooney MD;  Location: [facility] Main [facility];  Service: Gynecology    MD IMPLANT SPINAL NEUROSTIM/ Right 2/10/2021    Procedure: REPLACEMENT IMPLANTABLE PULSE GENERATOR DORSAL SPINAL COLUMN STIMULATOR, RIGHT;  Surgeon: Bucky Farooq MD;  Location:  MAIN OR;  Service: Neurosurgery    MD PERCUT IMPLNT Madalyn Perking Right 2020    Procedure: INSERTION THORACIC DORSAL COLUMN SPINAL CORD STIMULATOR PERCUTANEOUS W IMPLANTABLE PULSE GENERATOR, RIGHT;  Surgeon: Bucky Farooq MD;  Location:  MAIN OR;  Service: Neurosurgery    [facility]    UPPER GASTROINTESTINAL ENDOSCOPY  2020     Social History     Socioeconomic History    Marital status:      Spouse name: Not on file    Number of children: 2    Years of education: graduate school     Highest education level: Not on file   Occupational History    Occupation: Disabled   Social Needs    Financial resource strain: Somewhat hard    Food insecurity     Worry: Never true     Inability: Never true    Transportation needs     Medical: No     Non-medical: No   Tobacco Use    Smoking status: Never Smoker    Smokeless tobacco: Never Used   Substance and Sexual Activity    Alcohol use: Not Currently     Comment: 2 x year; being a social drinker as per all scripts     Drug use: No    Sexual activity: Not Currently   Lifestyle    Physical activity     Days per week: 0 days     Minutes per session: 0 min    Stress: Not on file   Relationships    Social connections     Talks on phone: More than three times a week     Gets together: More than three times a week     Attends Gnosticist service: More than 4 times per year     Active member of club or organization: No     Attends meetings of clubs or organizations: Never     Relationship status:     Intimate partner violence     Fear of current or ex partner: No     Emotionally abused: No     Physically abused: No     Forced sexual activity: No   Other Topics Concern    Not on file   Social History Narrative    Bereavement    Daily caffeine consumption, 6-8 servings per day     as per all scripts    Lives in [place]      Family History   Problem Relation Age of Onset    Colon cancer Mother     Alzheimer's disease Father     Stroke Father     Colon cancer Brother     Bipolar disorder Brother     Breast cancer Maternal Aunt     Colon cancer Maternal Aunt     Heart disease Other     Diabetes Other     Hypertension Other     Seizures Son     Depression Paternal Grandfather     No Known Problems Sister     No Known Problems Brother     Thyroid disease Neg Hx      ==  Patrick Su DO  Internal Medicine Resident, PGY-1  [facility]  [address]   [facility] , [address] [address] [facility], [address] [facility]  Office: [phone]  Fax: [phone]

## 2021-03-10 ENCOUNTER — PATIENT OUTREACH (OUTPATIENT)
Dept: INTERNAL MEDICINE CLINIC | Facility: CLINIC | Age: 56
End: 2021-03-10

## 2021-03-10 NOTE — PROGRESS NOTES
Pt has been seen by by HIRAM and RN/CM after PCP visit this date  Pt had come via transportation set up from her  insurance company  Pt has been recently discharged from [facility] and is living with her son  She is living  at [address]  She reports it is a first floor set up with access to the kitchen and bathroom and no steps  Her son works but is able to provide some assistance and  she dgt comes a couple times a week and help with cooking and she uses the microwave as well  Meals on Wheels offered but pt declined  Pt reports she is able to bathe and dress herself  Pt has a shower chair that she will have picked up from her dgt's home and her walker and wheelchair that her dgt is getting it for her  Pt reports her son does not drive  Pt is asking for help with housing  She wants to live independently again  SW has asked if she feels she can as she does need help at present from family  Pt relates she would like to pursue same  SW and our previous CHW had attempted to assist pt pt with housing but SW not snehal if she is on any list   She had been looking into [employer] in [place], [address] [place] [address] [address]  Pt is also registered with [employer] but apparently is not able to use this service until a $ 79 78 bill is paid up  Pt does have transportation through her [employer]  HIRAM to f/u with pt to inquire if she is set up with [address] and with an ICM again and will ask our CHW to f/u with pt re housing options as well

## 2021-03-11 ENCOUNTER — TELEPHONE (OUTPATIENT)
Dept: INTERNAL MEDICINE CLINIC | Facility: CLINIC | Age: 56
End: 2021-03-11

## 2021-03-11 ENCOUNTER — PATIENT OUTREACH (OUTPATIENT)
Dept: INTERNAL MEDICINE CLINIC | Facility: CLINIC | Age: 56
End: 2021-03-11

## 2021-03-11 ENCOUNTER — TELEPHONE (OUTPATIENT)
Dept: NEUROSURGERY | Facility: CLINIC | Age: 56
End: 2021-03-11

## 2021-03-11 DIAGNOSIS — Z78.9 NEEDS ASSISTANCE WITH COMMUNITY RESOURCES: Primary | ICD-10-CM

## 2021-03-11 NOTE — PROGRESS NOTES
HIRAM has spoken with pt along with Chrissy Malik RN/CM this date  HIRAM has clarified that pt is asking for housing and relates she would like to get on list in the [facility] area  Pt is also asking about bedroom furniture  Pt is receptive to referral to [place] our CHW re same  Pt also relates she is planning on resuming her [address] with [facility]  She plans to re-schedule and will let HIRAM know when  HIRAM did assist pt with a call to [facility] [phone] to see when they can resume their services  Pt relates that her son is working 2 jobs and is out of the house frequently  He does not drive due to having epilepsy  Her dgt will be coming on Sunday and will be there for about 4 weeks She will be coming several times a week  She does drive but does not have a working car at present  Pt relates her X  Lou Mancilla has been helpful and he is going to help her get her walker and wheelchair from [facility]  Pt shared she has showered herself and dressed herself and is managing same  Chrissy Malik RN has started her medication review and will f/u with pt tomorrow re same  HIRAM/RN/CHW to f/u with pt and assist as indicated

## 2021-03-11 NOTE — TELEPHONE ENCOUNTER
REC'D CALL FROM [facility] THE COORDINATOR FOR [facility]  THEY CALLED THE PATIENT TO START THE PROCESS FOR HOME HEALTH CARE  PATIENT REFUSED HOME CARE AT THIS TIME  SAID SHE ISNT READY AND THAT SHE'LL BE READY NEXT WEEK AND TOLD LESLIE TO CALL BACK THEN  PER LESLIE, THEY CAN NOT KEEP THE REFERRAL OPEN  PATIENT'S DOCTOR WILL NEED TO PUT IN A NEW REFERRAL WHEN THE PATIENT IS "READY"  PATIENT'S CURRENT REFERRAL HAS BEEN  CLOSED

## 2021-03-11 NOTE — TELEPHONE ENCOUNTER
Patient LM on nurse line requesting a call back  She stated that she has been having a hard time standing up straight  She says that this is a relatively new symptoms for her and she wants to know if the SCS is supposed to help with this  I called patient back to obtain more info  Patient is about 1 month s/p SCS IPG replacement  I asked patient if she has had any falls since the surgery and she said no  She said that she cannot stand upright without getting exteremly fatigued and feels "weaker" so she has been bending forward  She denies any pain at this time and says that overall her pain symptoms have improved  She reports having some bilateral arm/hand weakness as well but when asked if she has been dropping things, she replied with "yes, but that has been going on for a very long time now"  Patient said  that she talked with the rep from the [employer], but they recommended she calls our office  I advised aicha that I would relay her message to [facility] and either she or myself will be in touch with her with any recommendations

## 2021-03-11 NOTE — PROGRESS NOTES
Outpatient Care Management Note:    , Annabelle Mario and I placed call to patient to follow up  Patient reports she is planning on resuming mental health treatment with [facility] mental [facility] on [address] in [facility]  She reports she can call and schedule and encouraged to let PCP office know when she gets an appointment  Patient use to have ICM worker through [facility] assisted patient to call [place] to get reconnected with services  Patient living with son who works 2 jobs and does not drive due to having seizures  She reports her daughter is to start this week coming during the day to be with her a few times a week  Daughter drives but her car is not working at this time  Patient reports he ex  Valjean Apley lives in [place] has been helping her as needed and is going to help her get walker and wheelchair from [facility]  Reviewed with patient she has [address] but has outstanding bill with them she needs t pay before they will schedule any further trips  Patient reports she prefers to use her transportation through her insurance  Patient interested in getting on wait list for housing in the [facility] and CHW will be assisting with this  I tried reviewing medication with patient  Patient confirms with did get her  Morphine, oxycodone and lyrica from pharmacy  I reviewed instructions how it is prescribed to take  Patient reports she will need to get Tylenol as insurance would not cover it  Patient aware of catalog with insurance to get over the counter products and can order online too  Patient went to look for her medication and was away from phone for several minutes and came back asking if I could call her tomorrow to further review her medication around 11 am  Patient reports she has a lot of medication and will have it available to review when I call her tomorrow  Patient does not have any further questions, concerns, or other needs at this time   Pt has my contact # and PCP office # if needed  Pt is agreeable for further outreach

## 2021-03-12 ENCOUNTER — PATIENT OUTREACH (OUTPATIENT)
Dept: INTERNAL MEDICINE CLINIC | Facility: CLINIC | Age: 56
End: 2021-03-12

## 2021-03-12 DIAGNOSIS — R06.00 DYSPNEA: Primary | ICD-10-CM

## 2021-03-12 RX ORDER — SOFT LENS DISINFECTANT
SOLUTION, NON-ORAL MISCELLANEOUS AS NEEDED
Qty: 1 EACH | Refills: 0 | Status: SHIPPED | OUTPATIENT
Start: 2021-03-12 | End: 2021-03-29 | Stop reason: SDUPTHER

## 2021-03-12 NOTE — PROGRESS NOTES
Outpatient Care Management Note:    Called and spoke with patient  Reviewed medication with her that she has at home and reports is taking  Patient did report she has nebulizer solution but no nebulizer machine at home  Will request physician order nebulizer machine and patient agreeable for script to be faxed to Yanet Camejo to see if it will be covered through insurance  Patient also reports she has not had a bowel movement since being discharged from [facility]  She reports she is passing gas  Patient reports taking Senna  I encouraged her to take her Miralax daily  I instructed her to call office if she does not have a bowel movement over the weekend  I did review with patient her follow up appointment with PCP office on 4/13 and to bring all medication bottles with her to appointment  She reports she has the following:    Ascorbic Acid 500 mg twice a day     Biotene daily    Buspirone HCL 15 mg tablet 1 tablet twice a day Fam [employer] listed taking three times a day but patient reports only taking twice a day)    Daily-Annette (multivitamin) once a day    Duloxetine 60 mg daily     Ferrous Sulfate 324 mg 1 tablet twice a day    Folic Acid 1 mg daily     Hydroxyzine HCl 50 mg daily as needed    Hydroxyzine HCl 25 mg tablets takes 2 tablets at bedtime     Ingrezza 80 mg 1 capsule daily     Ipratropium/Albuterol solution 0 5-2 5 mg every 6 hours as needed    Lyrica (patient reports has 150 mg tablets she takes twice a day   She is aware script for 225 mg tablets to take twice a day were sent to pharmacy for her to be taking)    Morphine 15 mg twice a day    Miralax powder 17g one time a day (taking as needed)     Omeprazole 20 mg capsule twice a day     Oxybutynin Chloride 5 mg 1 tablet twice a day    Oxycodone 5 mg daily    Propanolol HC 20 mg 1 tablet twice a day     Senna S 50mg-8 6mg takes 2 tablets twice a day    Seroquel 300 mg at bedime     Tazaodone 100 mg tablet takes 200 mg at bedtime Vitamin D3 1,000 units daily     Topical pain cream she reports using

## 2021-03-16 ENCOUNTER — PATIENT OUTREACH (OUTPATIENT)
Dept: INTERNAL MEDICINE CLINIC | Facility: CLINIC | Age: 56
End: 2021-03-16

## 2021-03-16 DIAGNOSIS — K21.9 GASTROESOPHAGEAL REFLUX DISEASE WITHOUT ESOPHAGITIS: ICD-10-CM

## 2021-03-16 DIAGNOSIS — Z01.818 PREOP TESTING: ICD-10-CM

## 2021-03-16 DIAGNOSIS — R25.1 TREMOR: ICD-10-CM

## 2021-03-16 DIAGNOSIS — M17.11 PRIMARY OSTEOARTHRITIS OF RIGHT KNEE: ICD-10-CM

## 2021-03-16 DIAGNOSIS — G24.01 TARDIVE DYSKINESIA: Primary | ICD-10-CM

## 2021-03-16 DIAGNOSIS — N39.41 URGE INCONTINENCE OF URINE: ICD-10-CM

## 2021-03-16 RX ORDER — ASCORBIC ACID 500 MG
500 TABLET ORAL 2 TIMES DAILY
Qty: 60 TABLET | Refills: 1 | Status: CANCELLED | OUTPATIENT
Start: 2021-03-16

## 2021-03-16 NOTE — PROGRESS NOTES
Patient called requesting refills on her medication stating the many of them she only has 3 days to 1 week left  Ascorbic Acid 500 mg twice a day      Biotene daily     Buspirone HCL 15 mg tablet 1 tablet twice a day Fam Jennifertabitha listed taking three times a day but patient reports only taking twice a day)     Daily-Annette (multivitamin) once a day     Duloxetine 60 mg daily      Ferrous Sulfate 324 mg 1 tablet twice a day     Folic Acid 1 mg daily      Hydroxyzine HCl 50 mg daily as needed     Hydroxyzine HCl 25 mg tablets takes 2 tablets at bedtime      Ingrezza 80 mg 1 capsule daily      Ipratropium/Albuterol solution 0 5-2 5 mg every 6 hours as needed     Omeprazole 20 mg capsule twice a day     Oxybutynin Chloride 5 mg 1 tablet twice a day     Propanolol HC 20 mg 1 tablet twice a day      Senna S 50mg-8 6mg takes 2 tablets twice a day      Vitamin D3 1,000 units daily     Please note not all medication is either on current med list  I was not able to link all medication  Please see this note for medication patient is requesting  You can also reference my patient outreach encounter from 3/12/21 patient told me all the medication she has at home and is taking  Patient has follow up appointmetn 4/13/21       [facility] [address] [facility]

## 2021-03-16 NOTE — PROGRESS NOTES
Outpatient Care Management Note:     Luisito Handler received message from patient regarding medication  I further followed up with patient  She reports she called PCP office and spoke with someone regarding medication refills she needs  No refill request in chart  Patient did give me the names of the medication she needs  She reports she has 3 days to 1 week left of medication on most of them  Please see refill requesting encounter from 3/16/21 for further information  [facility] [address] in [place]  Patient did not have further questions or other needs at this time

## 2021-03-17 ENCOUNTER — TELEPHONE (OUTPATIENT)
Dept: NEUROSURGERY | Facility: CLINIC | Age: 56
End: 2021-03-17

## 2021-03-17 DIAGNOSIS — Z01.818 PREOP TESTING: ICD-10-CM

## 2021-03-17 DIAGNOSIS — F41.9 ANXIETY: ICD-10-CM

## 2021-03-17 DIAGNOSIS — M17.11 PRIMARY OSTEOARTHRITIS OF RIGHT KNEE: ICD-10-CM

## 2021-03-17 DIAGNOSIS — F41.1 GENERALIZED ANXIETY DISORDER: Primary | ICD-10-CM

## 2021-03-17 RX ORDER — ASCORBIC ACID 500 MG
500 TABLET ORAL 2 TIMES DAILY
Qty: 60 TABLET | Refills: 0 | Status: SHIPPED | OUTPATIENT
Start: 2021-03-17 | End: 2021-04-14

## 2021-03-17 RX ORDER — DULOXETIN HYDROCHLORIDE 60 MG/1
60 CAPSULE, DELAYED RELEASE ORAL DAILY
Qty: 30 CAPSULE | Refills: 0 | Status: SHIPPED | OUTPATIENT
Start: 2021-03-17 | End: 2021-04-14

## 2021-03-17 RX ORDER — FOLIC ACID 1 MG/1
1 TABLET ORAL DAILY
Qty: 30 TABLET | Refills: 1 | Status: SHIPPED | OUTPATIENT
Start: 2021-03-17 | End: 2021-04-14

## 2021-03-17 RX ORDER — HYDROXYZINE 50 MG/1
50 TABLET, FILM COATED ORAL
Qty: 90 TABLET | Refills: 0 | Status: SHIPPED | OUTPATIENT
Start: 2021-03-17 | End: 2021-06-03

## 2021-03-17 RX ORDER — BUSPIRONE HYDROCHLORIDE 15 MG/1
15 TABLET ORAL 3 TIMES DAILY
Qty: 90 TABLET | Refills: 0 | Status: SHIPPED | OUTPATIENT
Start: 2021-03-17 | End: 2021-07-07 | Stop reason: HOSPADM

## 2021-03-17 RX ORDER — OMEPRAZOLE 20 MG/1
20 CAPSULE, DELAYED RELEASE ORAL DAILY
Qty: 90 CAPSULE | Refills: 3 | Status: SHIPPED | OUTPATIENT
Start: 2021-03-17 | End: 2021-07-07 | Stop reason: HOSPADM

## 2021-03-17 RX ORDER — OXYBUTYNIN CHLORIDE 5 MG/1
5 TABLET ORAL 2 TIMES DAILY
Qty: 180 TABLET | Refills: 3 | Status: SHIPPED | OUTPATIENT
Start: 2021-03-17 | End: 2021-07-07 | Stop reason: HOSPADM

## 2021-03-17 RX ORDER — FERROUS SULFATE TAB EC 324 MG (65 MG FE EQUIVALENT) 324 (65 FE) MG
324 TABLET DELAYED RESPONSE ORAL
Qty: 60 TABLET | Refills: 1 | Status: SHIPPED | OUTPATIENT
Start: 2021-03-17 | End: 2021-04-13

## 2021-03-17 RX ORDER — PROPRANOLOL HYDROCHLORIDE 20 MG/1
20 TABLET ORAL 2 TIMES DAILY
Qty: 180 TABLET | Refills: 3 | Status: SHIPPED | OUTPATIENT
Start: 2021-03-17 | End: 2021-05-21

## 2021-03-17 RX ORDER — VALBENAZINE 80 MG/1
80 CAPSULE ORAL DAILY
Qty: 30 CAPSULE | Refills: 1 | Status: ON HOLD | OUTPATIENT
Start: 2021-03-17 | End: 2021-09-21 | Stop reason: SDUPTHER

## 2021-03-17 NOTE — TELEPHONE ENCOUNTER
Returned call to patient as a f/u to nurse-line call 3/11/2020   Reports knees giving out , has a hard time  Standing up straight during walking  In contact with [address] --turned stimulator off as he instructed for 3 days,  pain worsened in low back  Turned stimulator on today at a  Lower setting as reccommended by GUDINO Rep , # 3   Reinforced can take 72 hours to notice effect  Reminded patient she has bilateral osteoarthritis , is status pot steroid injectione in February in both knees  Orthopedics referral to [facility], she admits once bariatric surgeryr completed will proceed with orthopedic surgery  She verbalized and understanding and agreement w/ plan

## 2021-03-17 NOTE — TELEPHONE ENCOUNTER
The patient is a 89y Female complaining of shortness of breath. When patient saw Dr Valentino Hutchison she did not bring any of her medication to the appointment  I called her afterwards and I reviewed with her what medication she has and is taking (I listed all meds she has and is taking)  Please see my patient outreach note  I did request for medication list to be updated by provider at that time  I will request  to help patient get connected for a mental health appointment

## 2021-03-18 ENCOUNTER — PATIENT OUTREACH (OUTPATIENT)
Dept: INTERNAL MEDICINE CLINIC | Facility: CLINIC | Age: 56
End: 2021-03-18

## 2021-03-18 NOTE — PROGRESS NOTES
CHW received referral from [address] Mike to assist patient with housing Applications in the [facility]  CHW spoke to patient and she agreed to work with me  She is agreeable to [address] [place] Applications  Pt is concerned about the appearance of the location  This is important to patient  Patient states she is currently on SSI and receives $800 a month  Pt states she uses a Wheelchair and walker at times  CHW will meet with patient on Monday at 1pm to complete some housing applications       CHW will continue working with patient and communicate w/ team

## 2021-03-18 NOTE — PROGRESS NOTES
Sw has spoken wit pt over the phone to inquire about her OP [address] appointments ICM referral    Pt relates the she is scheduled with [address] location but did not know when  She did give Sw permission to call [facility] to find out when  [facility] [address] Location confirms pt has a a appointment for   Wednesday 3/31/21 @ 4:15 PM   It is over the phone  Pt has indicted that she is doing part of a phone intake later this afternoon with Susana Mancilla for getting hrr ICM services again  SW has called Eliseo Gutierrez Supervisor of the Wellness Recovery Team  [phone] but had to leave a message  SW to f/u and assist with same  Pt does relates that her walker and wheelchair have been picked up from [facility] [address] and she now has them

## 2021-03-22 NOTE — TELEPHONE ENCOUNTER
Patient did call to confirm that her medication were sent to the pharmacy  Patient made aware they were  She will follow up with the pharmacy

## 2021-03-23 ENCOUNTER — PATIENT OUTREACH (OUTPATIENT)
Dept: INTERNAL MEDICINE CLINIC | Facility: CLINIC | Age: 56
End: 2021-03-23

## 2021-03-23 NOTE — PROGRESS NOTES
CHW met with patient at the entrance of her home to get signatures for Housing Applications  Pt uses a rolator Walker to get around  She states she is only renting a room where she resides  [employer] Preliminary has been submitted  Ref # [id number]     [address] [facility] with patient to obtain copy of the Award Letter  CHW will complete other housing applications and submit them        CHW will continue to work w/ pt and communicate w/ team

## 2021-03-28 DIAGNOSIS — M54.16 LUMBAR RADICULOPATHY: ICD-10-CM

## 2021-03-28 DIAGNOSIS — G89.4 CHRONIC PAIN DISORDER: ICD-10-CM

## 2021-03-28 RX ORDER — MORPHINE SULFATE 15 MG/1
15 TABLET, FILM COATED, EXTENDED RELEASE ORAL 2 TIMES DAILY
Qty: 30 TABLET | Refills: 0 | Status: CANCELLED | OUTPATIENT
Start: 2021-03-28

## 2021-03-29 ENCOUNTER — TELEPHONE (OUTPATIENT)
Dept: INTERNAL MEDICINE CLINIC | Facility: CLINIC | Age: 56
End: 2021-03-29

## 2021-03-29 DIAGNOSIS — M54.16 LUMBAR RADICULOPATHY: ICD-10-CM

## 2021-03-29 DIAGNOSIS — G89.4 CHRONIC PAIN DISORDER: ICD-10-CM

## 2021-03-29 DIAGNOSIS — R06.00 DYSPNEA: ICD-10-CM

## 2021-03-29 RX ORDER — MORPHINE SULFATE 15 MG/1
15 TABLET, FILM COATED, EXTENDED RELEASE ORAL 2 TIMES DAILY
Qty: 30 TABLET | Refills: 0 | Status: SHIPPED | OUTPATIENT
Start: 2021-03-29 | End: 2021-04-20 | Stop reason: SDUPTHER

## 2021-03-29 RX ORDER — NALOXONE HYDROCHLORIDE 4 MG/.1ML
SPRAY NASAL
Qty: 1 EACH | Refills: 1 | Status: SHIPPED | OUTPATIENT
Start: 2021-03-29 | End: 2022-02-28 | Stop reason: CLARIF

## 2021-03-29 RX ORDER — OXYCODONE HYDROCHLORIDE 5 MG/1
5 TABLET ORAL DAILY
Qty: 15 TABLET | Refills: 0 | Status: SHIPPED | OUTPATIENT
Start: 2021-03-29 | End: 2021-04-20 | Stop reason: SDUPTHER

## 2021-03-29 RX ORDER — SOFT LENS DISINFECTANT
SOLUTION, NON-ORAL MISCELLANEOUS AS NEEDED
Qty: 1 EACH | Refills: 0 | Status: SHIPPED | OUTPATIENT
Start: 2021-03-29 | End: 2022-02-28 | Stop reason: CLARIF

## 2021-03-29 RX ORDER — IPRATROPIUM BROMIDE AND ALBUTEROL SULFATE 2.5; .5 MG/3ML; MG/3ML
3 SOLUTION RESPIRATORY (INHALATION) EVERY 6 HOURS PRN
Qty: 3 ML | Refills: 2 | Status: ON HOLD | OUTPATIENT
Start: 2021-03-29 | End: 2021-07-14 | Stop reason: CLARIF

## 2021-03-29 NOTE — TELEPHONE ENCOUNTER
Name of medication, dose, quantity and frequency: Morphine 15 mg    Number of refills left: 0    Amount of medication left:    Pharmacy verified and updated    Additional information:

## 2021-03-29 NOTE — TELEPHONE ENCOUNTER
MSO4 sent, inform pt that Narcan was also sent per protocol, please inform of proper use of Narcan  Dr Haley Maloney

## 2021-03-29 NOTE — TELEPHONE ENCOUNTER
[facility] called, patient does not have a qualifying respiratory diagnosis to receive a nebulizer  Please review and advise

## 2021-03-29 NOTE — TELEPHONE ENCOUNTER
Due 3/25/2021  PDMP checked    Please be advised per 3/9/2021 office visit you advised you will taper Oxycodone at this time

## 2021-03-29 NOTE — TELEPHONE ENCOUNTER
Patient called because she needs the script for a nebulizer and supplies called into [address]  pharmacy Fax # [phone]  [facility]  [facility] do not fill that there  Please let her know when this is faxed in

## 2021-03-30 ENCOUNTER — TELEPHONE (OUTPATIENT)
Dept: NEUROSURGERY | Facility: CLINIC | Age: 56
End: 2021-03-30

## 2021-03-30 DIAGNOSIS — Z96.89 STATUS POST INSERTION OF SPINAL CORD STIMULATOR: ICD-10-CM

## 2021-03-30 DIAGNOSIS — G89.4 CHRONIC PAIN SYNDROME: Primary | ICD-10-CM

## 2021-03-30 NOTE — TELEPHONE ENCOUNTER
Patient called nurseline stating her stimulator is not working  Patient is complaining of "bad leg pain" and weakness  States she is having trouble walking d/t these symptoms

## 2021-03-31 NOTE — TELEPHONE ENCOUNTER
Telephoned patient reports she cannot stand or walk secondary to  pain in back, legs and thighs  Was in contact with Rep told to turn SCS off for a few days , take a holiday  Reports pain is worse  Since having spinal cord stimulator inserted  She complaine the generator fell higher than last week  Admits since turning off SCS has a little more pain   She is awaiting TKA after weight loss BMI currently > 40   Suggest she turn SCS back on since with increased pain while off reports forgot how to turn on , Informed I will call rep and request he assist her in reactivation of SCS    I spoke with Rep yesterday he reports patient equates her back and leg pain to being over stimulated, tried to rationalize with patient, is not receptive  Patient with repeated complaint of loss of SCS efficacy over past few weeks , will obtain imagining to assess electrode lead placement  PLAN  Xray thoracolumbar assess lead placement  for migration and entire SCS system        02/10/21 0816  REPLACEMENT IMPLANTABLE PULSE GENERATOR DORSAL SPINAL COLUMN STIMULATOR, RIGHT (Right Buttocks)  See dictated note [employer] +SN:[id number]    7/28/2021   INSERTION THORACIC DORSAL COLUMN SPINAL CORD STIMULATOR PERCUTANEOUS W IMPLANTABLE PULSE GENERATOR, RIGHT (Right Spine Thoracic)----See dictated note  algrano---[employer]--SN:[id number]  x1 electrode [id number]   We traveled one electrode through one epidural access needles up to towards approximately T7 on the top   We confirmed placement of the the lead on fluoro  I decided against another electrode because she had extensive surgery for scoliosis and scar therefore limiting safe placement of another electrode

## 2021-04-02 ENCOUNTER — PATIENT OUTREACH (OUTPATIENT)
Dept: INTERNAL MEDICINE CLINIC | Facility: CLINIC | Age: 56
End: 2021-04-02

## 2021-04-02 NOTE — PROGRESS NOTES
[address] for 1 Bed apt - Position on Wait ist is # [employer] completed and will be faxed and scanned on chart Monday  [employer] completed will be faxed and scanned on chart on Monday  [employer] application completed  Pt still need to provide residence since 1975 and the people that have resided with her since [address]: Appointment is scheduled for April 26 at 2:00pm  Pt will meet me at [address]  Daughter & pt are aware and  she needs to coordinate a ride to get to the location       CHW will continue Working with pt and communicate w/ team

## 2021-04-05 NOTE — TELEPHONE ENCOUNTER
Patient made aware she does not qualify for nebulizer machine  She will need to come in for appt to discuss   Please see insurance guidelines scanned into chart

## 2021-04-06 ENCOUNTER — HOSPITAL ENCOUNTER (OUTPATIENT)
Dept: RADIOLOGY | Facility: HOSPITAL | Age: 56
Discharge: HOME | End: 2021-04-06
Payer: COMMERCIAL

## 2021-04-06 ENCOUNTER — TRANSCRIBE ORDERS (OUTPATIENT)
Dept: RADIOLOGY | Facility: HOSPITAL | Age: 56
End: 2021-04-06

## 2021-04-06 DIAGNOSIS — Z96.89 STATUS POST INSERTION OF SPINAL CORD STIMULATOR: ICD-10-CM

## 2021-04-06 DIAGNOSIS — G89.4 CHRONIC PAIN SYNDROME: ICD-10-CM

## 2021-04-06 PROCEDURE — 72080 X-RAY EXAM THORACOLMB 2/> VW: CPT

## 2021-04-07 ENCOUNTER — TELEPHONE (OUTPATIENT)
Dept: INTERNAL MEDICINE CLINIC | Facility: CLINIC | Age: 56
End: 2021-04-07

## 2021-04-07 DIAGNOSIS — G89.29 CHRONIC BILATERAL LOW BACK PAIN WITH BILATERAL SCIATICA: Primary | ICD-10-CM

## 2021-04-07 DIAGNOSIS — M54.41 CHRONIC BILATERAL LOW BACK PAIN WITH BILATERAL SCIATICA: Primary | ICD-10-CM

## 2021-04-07 DIAGNOSIS — M54.42 CHRONIC BILATERAL LOW BACK PAIN WITH BILATERAL SCIATICA: Primary | ICD-10-CM

## 2021-04-07 NOTE — TELEPHONE ENCOUNTER
4/7/21 PT CALLED AND STATES SHE WENT FOR XRAYS 4/6/21 AND IS AWAITING A CALL BACK FROM OUR OFFICE  FYI RESULTS ARE NOT FINAL YET  TY  PLEASE CONTACT PT ONCE REPORT IS FINAL TO DISCUSS

## 2021-04-07 NOTE — TELEPHONE ENCOUNTER
Script for Bariatric wheel chair ,office notes already fax to [facility] medical supply  Confirmation receive  Patient aware

## 2021-04-07 NOTE — TELEPHONE ENCOUNTER
Patient called, states the wheelchair she has is too small  She is falling out of it and dragging her feet  She is very uncomfortable and the smaller chair causes her pain  Please issue a new script for a bariatric wheelchair  Please print it, have the attending sign and fax to [address] [facility] per the patient's instructions

## 2021-04-08 ENCOUNTER — PATIENT OUTREACH (OUTPATIENT)
Dept: INTERNAL MEDICINE CLINIC | Facility: CLINIC | Age: 56
End: 2021-04-08

## 2021-04-08 NOTE — PROGRESS NOTES
DAVIDW faxed the [place] apt housing application located at [address] [facility] located at [facility] [place] [address] fax # [phone] application have been secured e-mail to [place] [address] on [address] [facility] list for 1 bed apt position on Wait list # [phone]

## 2021-04-09 ENCOUNTER — PATIENT OUTREACH (OUTPATIENT)
Dept: INTERNAL MEDICINE CLINIC | Facility: CLINIC | Age: 56
End: 2021-04-09

## 2021-04-09 NOTE — PROGRESS NOTES
Outpatient Care Management Note:     workSheila on phone with patient to follow up regarding mental health and ICM services  Please see her note for additional information  Patient had PT/OT with her today  I did remind her of her follow up appointment with PCP office on Tuesday 4/13 @ 2 pm  I explained to her the need to follow up and instructed her to bring all her medication bottles with her to the visit  Patient reports she has transportation through her insurance that she is able to setup  She reports her daughter's car is not working at this time and her son works 2 jobs  Patient may ask her ex- to bring her  Patient reports she can use her rollator for short distances but prefers to use wheelchair when she is going out for appointments  Patient reports she is following up with Neurosurgery office regarding her spinal cord stimulator and is awaiting xray results  Patient does not have any further questions, concerns, or other needs at this time  Pt has my contact # and PCP office # if needed  Pt is agreeable for further outreach

## 2021-04-09 NOTE — PROGRESS NOTES
SW has spoken with pt who relates she is working with her Occupational Therapist at this time and she will have her Physical Therapist see her this afternoon as well  Pt relates she is not feeling the best    Pt agreed to call Sw after her therapy later today  SW did inquire if she has gotten to her [facility] appointment 3/31/21 and she said NO she had forgotten  HIRAM asked if she had contact with her  ICM through [facility] but she was not sure  SW did offer to contact them on her behalf and she agreed  SW has again called Ankita Barrera Supervisor of the Wellness Recovery Team [phone] as well as Ayde Mitchell [phone] but had to leave a message  SW to f/u with pt re her [address] and ICM referral      SW received return call from [facility] [address] [facility] who relates that they did not get the referral from [facility]  SW also received a return call from pt and Alex Castrejon RN/CM also joined us  Please see her note  HIRAM assisted pt with a 3 way call to [address] at ![phone]  Pt was able to re-schedule her missed [address] appointment for a VIRTUAL appointment for Thursday 4/15/21 at 1:30 PM   Pt was also reminded about her PCP visit for Tuesday at 4/13/21 at 2:00 pm with a 1:45 pm arrival   Pt encouraged to set up her ride today for same  Pt later called back to HIRAM to confirm here [address] appointment but did confirm her [address] appointment  ADDENDUM:  Call received Ankita Barrera from [facility] has returned call who relates they do need a new referral   Jacque Rodríguez did relate the CM from [facility] will be  Making a new referral to them  HIRAM also received call from Mr Nessa Iyer CM at [facility] [address] that he will f/u with pt re a ICM referral to [facility] as well  SW/RN/CM to f/u with pt as indicated  ADDENDUM:   Call received Ankita Barrera from [facility] has returned call who relates they do need a new referral     Jacque Rodríguez did relate the CM from [facility] will be making a new referral to them    She relates she will f/u with pt and us when they get to see pt again

## 2021-04-12 NOTE — TELEPHONE ENCOUNTER
Reports the generator is pressing out against her skin  Is painful  SCS helping pain in back and legs   Schedule appointment for -April 19 @ 1400 to assess     2/10/2021   REPLACEMENT IMPLANTABLE PULSE GENERATOR DORSAL SPINAL COLUMN STIMULATOR, RIGHT (Right Buttocks)----    Excerpt from note 1/18/2021Dr Naa's note  Patient with generator that has a slight tilt she is able to recharge in a leaned position  There is some recharging issues still  After several attempts to recharge apparently the generator is tipped in a position that is difficult to recharge  We will replace the generator with a non rechargeable primary cell generator, [employer]  This prompted surgery above

## 2021-04-13 ENCOUNTER — HOSPITAL ENCOUNTER (EMERGENCY)
Facility: HOSPITAL | Age: 56
Discharge: HOME | End: 2021-04-13
Attending: EMERGENCY MEDICINE
Payer: COMMERCIAL

## 2021-04-13 ENCOUNTER — APPOINTMENT (EMERGENCY)
Dept: RADIOLOGY | Facility: HOSPITAL | Age: 56
End: 2021-04-13
Payer: COMMERCIAL

## 2021-04-13 VITALS
DIASTOLIC BLOOD PRESSURE: 93 MMHG | TEMPERATURE: 97.8 F | HEART RATE: 69 BPM | OXYGEN SATURATION: 93 % | RESPIRATION RATE: 21 BRPM | SYSTOLIC BLOOD PRESSURE: 147 MMHG

## 2021-04-13 DIAGNOSIS — Z01.818 PREOP TESTING: ICD-10-CM

## 2021-04-13 DIAGNOSIS — F41.1 GENERALIZED ANXIETY DISORDER: ICD-10-CM

## 2021-04-13 DIAGNOSIS — M17.11 PRIMARY OSTEOARTHRITIS OF RIGHT KNEE: ICD-10-CM

## 2021-04-13 DIAGNOSIS — E87.6 HYPOKALEMIA: ICD-10-CM

## 2021-04-13 DIAGNOSIS — F41.9 ANXIETY: Primary | ICD-10-CM

## 2021-04-13 DIAGNOSIS — M54.9 BACK PAIN: ICD-10-CM

## 2021-04-13 DIAGNOSIS — F45.8 HYPERVENTILATION SYNDROME: ICD-10-CM

## 2021-04-13 LAB
ALBUMIN SERPL BCP-MCNC: 3.7 G/DL (ref 3.5–5)
ALP SERPL-CCNC: 93 U/L (ref 46–116)
ALT SERPL W P-5'-P-CCNC: 19 U/L (ref 12–78)
ANION GAP SERPL CALCULATED.3IONS-SCNC: 7 MMOL/L (ref 4–13)
AST SERPL W P-5'-P-CCNC: 13 U/L (ref 5–45)
ATRIAL RATE: 76 BPM
BASOPHILS # BLD AUTO: 0.05 THOUSANDS/ΜL (ref 0–0.1)
BASOPHILS NFR BLD AUTO: 1 % (ref 0–1)
BILIRUB SERPL-MCNC: 1.33 MG/DL (ref 0.2–1)
BUN SERPL-MCNC: 5 MG/DL (ref 5–25)
CALCIUM SERPL-MCNC: 9 MG/DL (ref 8.3–10.1)
CHLORIDE SERPL-SCNC: 105 MMOL/L (ref 100–108)
CO2 SERPL-SCNC: 29 MMOL/L (ref 21–32)
CREAT SERPL-MCNC: 0.71 MG/DL (ref 0.6–1.3)
EOSINOPHIL # BLD AUTO: 0.07 THOUSAND/ΜL (ref 0–0.61)
EOSINOPHIL NFR BLD AUTO: 2 % (ref 0–6)
ERYTHROCYTE [DISTWIDTH] IN BLOOD BY AUTOMATED COUNT: 13.5 % (ref 11.6–15.1)
GFR SERPL CREATININE-BSD FRML MDRD: 109 ML/MIN/1.73SQ M
GLUCOSE SERPL-MCNC: 95 MG/DL (ref 65–140)
HCT VFR BLD AUTO: 43.9 % (ref 34.8–46.1)
HGB BLD-MCNC: 15 G/DL (ref 11.5–15.4)
IMM GRANULOCYTES # BLD AUTO: 0.01 THOUSAND/UL (ref 0–0.2)
IMM GRANULOCYTES NFR BLD AUTO: 0 % (ref 0–2)
LYMPHOCYTES # BLD AUTO: 1.36 THOUSANDS/ΜL (ref 0.6–4.47)
LYMPHOCYTES NFR BLD AUTO: 29 % (ref 14–44)
MCH RBC QN AUTO: 28.6 PG (ref 26.8–34.3)
MCHC RBC AUTO-ENTMCNC: 34.2 G/DL (ref 31.4–37.4)
MCV RBC AUTO: 84 FL (ref 82–98)
MONOCYTES # BLD AUTO: 0.51 THOUSAND/ΜL (ref 0.17–1.22)
MONOCYTES NFR BLD AUTO: 11 % (ref 4–12)
NEUTROPHILS # BLD AUTO: 2.73 THOUSANDS/ΜL (ref 1.85–7.62)
NEUTS SEG NFR BLD AUTO: 57 % (ref 43–75)
NRBC BLD AUTO-RTO: 0 /100 WBCS
P AXIS: 70 DEGREES
PLATELET # BLD AUTO: 297 THOUSANDS/UL (ref 149–390)
PMV BLD AUTO: 9.7 FL (ref 8.9–12.7)
POTASSIUM SERPL-SCNC: 2.9 MMOL/L (ref 3.5–5.3)
PR INTERVAL: 140 MS
PROT SERPL-MCNC: 6.9 G/DL (ref 6.4–8.2)
QRS AXIS: 109 DEGREES
QRSD INTERVAL: 84 MS
QT INTERVAL: 416 MS
QTC INTERVAL: 468 MS
RBC # BLD AUTO: 5.25 MILLION/UL (ref 3.81–5.12)
SODIUM SERPL-SCNC: 141 MMOL/L (ref 136–145)
T WAVE AXIS: -23 DEGREES
TROPONIN I SERPL-MCNC: <0.02 NG/ML
VENTRICULAR RATE: 76 BPM
WBC # BLD AUTO: 4.73 THOUSAND/UL (ref 4.31–10.16)

## 2021-04-13 PROCEDURE — 85025 COMPLETE CBC W/AUTO DIFF WBC: CPT | Performed by: EMERGENCY MEDICINE

## 2021-04-13 PROCEDURE — 99284 EMERGENCY DEPT VISIT MOD MDM: CPT | Performed by: EMERGENCY MEDICINE

## 2021-04-13 PROCEDURE — 71045 X-RAY EXAM CHEST 1 VIEW: CPT

## 2021-04-13 PROCEDURE — 96376 TX/PRO/DX INJ SAME DRUG ADON: CPT

## 2021-04-13 PROCEDURE — 99284 EMERGENCY DEPT VISIT MOD MDM: CPT

## 2021-04-13 PROCEDURE — 93005 ELECTROCARDIOGRAM TRACING: CPT

## 2021-04-13 PROCEDURE — 96374 THER/PROPH/DIAG INJ IV PUSH: CPT

## 2021-04-13 PROCEDURE — 93010 ELECTROCARDIOGRAM REPORT: CPT | Performed by: INTERNAL MEDICINE

## 2021-04-13 PROCEDURE — 84484 ASSAY OF TROPONIN QUANT: CPT | Performed by: EMERGENCY MEDICINE

## 2021-04-13 PROCEDURE — 36415 COLL VENOUS BLD VENIPUNCTURE: CPT | Performed by: EMERGENCY MEDICINE

## 2021-04-13 PROCEDURE — 80053 COMPREHEN METABOLIC PANEL: CPT | Performed by: EMERGENCY MEDICINE

## 2021-04-13 RX ORDER — POTASSIUM CHLORIDE 20 MEQ/1
40 TABLET, EXTENDED RELEASE ORAL ONCE
Status: COMPLETED | OUTPATIENT
Start: 2021-04-13 | End: 2021-04-13

## 2021-04-13 RX ORDER — ACETAMINOPHEN 325 MG/1
650 TABLET ORAL ONCE
Status: COMPLETED | OUTPATIENT
Start: 2021-04-13 | End: 2021-04-13

## 2021-04-13 RX ORDER — POTASSIUM CHLORIDE 750 MG/1
20 TABLET, EXTENDED RELEASE ORAL 2 TIMES DAILY
Qty: 8 TABLET | Refills: 0 | Status: SHIPPED | OUTPATIENT
Start: 2021-04-13 | End: 2021-05-17 | Stop reason: HOSPADM

## 2021-04-13 RX ORDER — FERROUS SULFATE TAB EC 324 MG (65 MG FE EQUIVALENT) 324 (65 FE) MG
324 TABLET DELAYED RESPONSE ORAL
Qty: 60 TABLET | Refills: 1 | Status: SHIPPED | OUTPATIENT
Start: 2021-04-13 | End: 2022-02-28 | Stop reason: SDUPTHER

## 2021-04-13 RX ORDER — LORAZEPAM 2 MG/ML
1 INJECTION INTRAMUSCULAR ONCE
Status: COMPLETED | OUTPATIENT
Start: 2021-04-13 | End: 2021-04-13

## 2021-04-13 RX ORDER — LORAZEPAM 2 MG/ML
0.5 INJECTION INTRAMUSCULAR ONCE
Status: COMPLETED | OUTPATIENT
Start: 2021-04-13 | End: 2021-04-13

## 2021-04-13 RX ADMIN — LORAZEPAM 1 MG: 2 INJECTION INTRAMUSCULAR; INTRAVENOUS at 13:22

## 2021-04-13 RX ADMIN — ACETAMINOPHEN 650 MG: 325 TABLET, FILM COATED ORAL at 15:02

## 2021-04-13 RX ADMIN — POTASSIUM CHLORIDE 40 MEQ: 1500 TABLET, EXTENDED RELEASE ORAL at 15:47

## 2021-04-13 RX ADMIN — LORAZEPAM 0.5 MG: 2 INJECTION INTRAMUSCULAR; INTRAVENOUS at 14:59

## 2021-04-13 NOTE — ED ATTENDING ATTESTATION
4/13/2021  IJose MD, saw and evaluated the patient  I have discussed the patient with the resident/non-physician practitioner and agree with the resident's/non-physician practitioner's findings, Plan of Care, and MDM as documented in the resident's/non-physician practitioner's note, except where noted  All available labs and Radiology studies were reviewed  I was present for key portions of any procedure(s) performed by the resident/non-physician practitioner and I was immediately available to provide assistance  At this point I agree with the current assessment done in the Emergency Department  I have conducted an independent evaluation of this patient a history and physical is as follows:    Patient presents emergency department chief complaint of I am having a panic attack  The patient reports she has  been her normal state of health recently but woke at 4:00 a m  Feeling very anxious  Since that time the patient became very worried and felt like she was breathing faster than usual and developed tingling in both arms and to both sides of her face and around her mouth  The patient reports cramping of both hands  The patient reports she has had history of panic attacks in the past that have been associated with tingling in her arms and shortness of breath  The patient denies any pain  The patient also admits to gradually increasing low back pain for several weeks  The patient reports this has not changed appreciably today  The patient denies any weakness or paresthesia lower extremity  The patient denies any change in her bowel or bladder function  The patient denies back trauma  The patient reports the back pain that she is having is a worsening of the chronic pain that she has and not a new complaint  The patient is not aware of any external factors causing her increased anxiety  Physical exam demonstrates a female who appears anxious but nontoxic    HEENT exam is normal   Lungs are clear with equal breath sounds  The heart had a regular rate rhythm  The abdomen is soft and nontender  The extremities are symmetric and nontender  There is no focal neurologic deficit  There is mild tenderness in her bilateral lumbar paraspinal muscles without midline tenderness or deformity  The thoracic spine is nontender  The patient has normal strength and sensation all extremities  There is no evidence of cranial nerve deficits  Skin had no rash      ED Course         Critical Care Time  Procedures

## 2021-04-13 NOTE — ED PROVIDER NOTES
History  Chief Complaint   Patient presents with    Numbness     NUMBNESS IN HANDS BILSTERALLY SINCE 5AM  WORSENING NECK AND BACK PAIN  59 y/o female with hx of anxiety, HTN, obesity, chronic back pain, back surgeries, and s/p spinal cord stimulator placement presents to the ED c/o anxiety, SOB, and bilateral hand numbness that started after she woke from sleep this morning  She states that she has experienced similar symptoms in the past with anxiety attacks  She also reports gradually worsening chronic back pain over the last several weeks despite spinal cord stimulator  She denies fever, chills, cough, chest pain, abdominal pain, N/V/D, urinary symptoms, headache, and focal weakness  Prior to Admission Medications   Prescriptions Last Dose Informant Patient Reported? Taking?    LORazepam (ATIVAN) 0 5 mg tablet   No No   Sig: Take 1 tablet (0 5 mg total) by mouth every 8 (eight) hours as needed for anxiety for up to 10 days Hold if sedated   Patient not taking: Reported on 11/27/2020   Mouthwashes (Biotene Dry Mouth) LIQD   Yes No   Sig: Apply 5 mL to the mouth or throat daily   Multiple Vitamins-Minerals (multivitamin with minerals) tablet  Outside Facility (Specify) No No   Sig: Take 1 tablet by mouth daily   Polyethylene Glycol 3350 (MIRALAX PO)  Outside Facility (Specify) Yes No   Sig: Take by mouth   Respiratory Therapy Supplies (Nebulizer) YUDY   No No   Sig: Use as needed (Shortness of breath)   SIMETHICONE PO  Outside Facility (Specify) Yes No   Sig: Take by mouth as needed    Sennosides (SENEXON PO)  Outside Facility (Specify) Yes No   Sig: Take by mouth   Valbenazine Tosylate (Ingrezza) 80 MG CAPS   No No   Sig: Take 80 mg by mouth daily   acetaminophen (TYLENOL) 325 mg tablet   No No   Sig: Take 2 tablets (650 mg total) by mouth every 8 (eight) hours as needed for mild pain   amLODIPine (NORVASC) 5 mg tablet  Outside Facility (Specify) No No   Sig: Take 1 tablet (5 mg total) by mouth daily bisacodyl (DULCOLAX) 5 mg EC tablet  Outside Facility (Specify) Yes No   Sig: Take 5 mg by mouth daily as needed for constipation   busPIRone (BUSPAR) 15 mg tablet   No No   Sig: Take 1 tablet (15 mg total) by mouth 3 (three) times a day   cetirizine (ZyrTEC) 10 mg tablet  Outside Facility (Specify) Yes No   Sig: Take 10 mg by mouth as needed    cholecalciferol (VITAMIN D3) 1,000 units tablet  Outside Facility (Specify) No No   Sig: Take 1 tablet (1,000 Units total) by mouth daily   diclofenac sodium (VOLTAREN) 1 %  Outside Facility (Specify) No No   Sig: APPLY 4 G TOPICALLY 4 (FOUR) TIMES A DAY   Patient taking differently: Apply 4 g topically 2 (two) times a day    ferrous sulfate 324 (65 Fe) mg   No No   Sig: TAKE 1 TABLET (324 MG TOTAL) BY MOUTH 2 (TWO) TIMES A DAY BEFORE MEALS   hydrOXYzine HCL (ATARAX) 50 mg tablet   No No   Sig: Take 1 tablet (50 mg total) by mouth daily at bedtime as needed for anxiety (insomnia)   ipratropium-albuterol (DUO-NEB) 0 5-2 5 mg/3 mL nebulizer solution   No No   Sig: Take 1 vial (3 mL total) by nebulization every 6 (six) hours as needed for wheezing or shortness of breath   lidocaine (LMX) 4 % cream  Outside Facility (Specify) No No   Sig: Apply topically as needed for mild pain   Patient not taking: Reported on 2/24/2021   morphine (MS CONTIN) 15 mg 12 hr tablet   No No   Sig: Take 1 tablet (15 mg total) by mouth 2 (two) times a day Hold if sedatedMax Daily Amount: 30 mg   naloxone (NARCAN) 4 mg/0 1 mL nasal spray   No No   Sig: Administer 1 spray into a nostril  If no response after 2-3 minutes, give another dose in the other nostril using a new spray     omeprazole (PriLOSEC) 20 mg delayed release capsule   No No   Sig: Take 1 capsule (20 mg total) by mouth daily   ondansetron (ZOFRAN-ODT) 4 mg disintegrating tablet  Outside Facility (Specify) No No   Sig: Take 1 tablet (4 mg total) by mouth every 6 (six) hours as needed for nausea or vomiting   Patient not taking: Reported on 2021   oxyCODONE (ROXICODONE) 5 mg immediate release tablet   No No   Sig: Take 1 tablet (5 mg total) by mouth dailyMax Daily Amount: 5 mg   oxybutynin (DITROPAN) 5 mg tablet   No No   Sig: Take 1 tablet (5 mg total) by mouth 2 (two) times a day   prazosin (MINIPRESS) 2 mg capsule  Outside Facility (Specify) No No   Sig: TAKE 1 CAPSULE BY MOUTH EVERY DAY   pregabalin (LYRICA) 150 mg capsule  Outside Facility (Specify) No No   Sig: Take 1 capsule (150 mg total) by mouth 3 (three) times a day   pregabalin (LYRICA) 225 MG capsule   No No   Sig: Take 1 capsule (225 mg total) by mouth every 12 (twelve) hours   propranolol (INDERAL) 20 mg tablet   No No   Sig: Take 1 tablet (20 mg total) by mouth 2 (two) times a day   sodium chloride (OCEAN) 0 65 % nasal spray   Yes No   Si sprays into each nostril as needed   triamcinolone (KENALOG) 0 025 % cream  Outside Facility (Specify) No No   Sig: Apply topically 2 (two) times a day      Facility-Administered Medications: None       Past Medical History:   Diagnosis Date    Acid reflux     Anxiety     RESOLVED: [id number]    Arthritis     Bipolar 2 disorder (HCC)     FOLLOWS WITH PSYCHIATRIST  CONTINUE LAMOTRIGINE; RESOLVED: [id number]    Depression     Familial tremor     both hands    Fibromyalgia     LAST ASSESSED: [id number]    Hearing aid worn     left ear    [place] (hard of hearing)     left ear    Hypertension     Left-sided weakness     Lower back pain     Memory loss of unknown cause     long and short term    Migraine     Obesity     Overactive bladder     Panic attack     Post traumatic stress disorder     Seasonal allergies     Stroke [facility])     questionable stroke 2009    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    CONT ASA 81 MG DAILY; RESOLVED: [id number]    Urinary incontinence     Wears dentures     partial lower / full upper    Wears glasses        Past Surgical History:   Procedure Laterality Date    BACK SURGERY   SECTION      COLONOSCOPY      RESOLVED: [id number]    EAR SURGERY      EGD      HYSTERECTOMY      MYRINGOTOMY W/ TUBES Left     NECK SURGERY  2019    IN CYSTOURETHROSCOPY N/A 2016    Procedure: CYSTOSCOPY, BOTOX INJECTION;  Surgeon: Mignon Robles MD;  Location: [facility];  Service: Gynecology    IN IMPLANT SPINAL NEUROSTIM/ Right 2/10/2021    Procedure: REPLACEMENT IMPLANTABLE PULSE GENERATOR DORSAL SPINAL COLUMN STIMULATOR, RIGHT;  Surgeon: Sabrina Cerna MD;  [facility]: [facility];  Service: Neurosurgery    IN PERCUT IMPLNT [address] Right 2020    Procedure: INSERTION THORACIC DORSAL COLUMN SPINAL CORD STIMULATOR PERCUTANEOUS W IMPLANTABLE PULSE GENERATOR, RIGHT;  Surgeon: Sabrina Cerna MD;  Location:  MAIN OR;  Service: Neurosurgery    [address] [place] [address]    UPPER GASTROINTESTINAL ENDOSCOPY  2020       Family History   Problem Relation Age of Onset    Colon cancer Mother     Alzheimer's disease Father     Stroke Father     Colon cancer Brother     Bipolar disorder Brother     Breast cancer Maternal Aunt     Colon cancer Maternal Aunt     Heart disease Other     Diabetes Other     Hypertension Other     Seizures Son     Depression Paternal Grandfather     No Known Problems Sister     No Known Problems Brother     Thyroid disease Neg Hx      I have reviewed and agree with the history as documented  E-Cigarette/Vaping    E-Cigarette Use Never User      E-Cigarette/Vaping Substances    Nicotine No     THC No     CBD No     Flavoring No     Other No     Unknown No      Social History     Tobacco Use    Smoking status: Never Smoker    Smokeless tobacco: Never Used   Substance Use Topics    Alcohol use: Not Currently     Comment: 2 x year; being a social drinker as per all scripts     Drug use: No        Review of Systems   Constitutional: Negative for chills and fever     Eyes: Negative for photophobia and visual disturbance  Respiratory: Positive for shortness of breath  Negative for cough  Cardiovascular: Negative for chest pain, palpitations and leg swelling  Gastrointestinal: Negative for abdominal pain, diarrhea, nausea and vomiting  Genitourinary: Negative for dysuria and hematuria  Musculoskeletal: Positive for back pain  Negative for neck pain  Neurological: Positive for numbness  Negative for dizziness, seizures, syncope, weakness, light-headedness and headaches  All other systems reviewed and are negative  Physical Exam  ED Triage Vitals [04/13/21 1123]   Temperature Pulse Respirations Blood Pressure SpO2   97 8 °F (36 6 °C) 85 18 (!) 188/121 100 %      Temp Source Heart Rate Source Patient Position - Orthostatic VS BP Location FiO2 (%)   Oral Monitor Lying Left arm --      Pain Score       9             Orthostatic Vital Signs  Vitals:    04/13/21 1123 04/13/21 1335 04/13/21 1400   BP: (!) 188/121 142/76 147/93   Pulse: 85 67 69   Patient Position - Orthostatic VS: Lying  Lying       Physical Exam  Vitals signs and nursing note reviewed  Constitutional:       General: She is not in acute distress  Appearance: Normal appearance  She is obese  HENT:      Head: Normocephalic and atraumatic  Right Ear: External ear normal       Left Ear: External ear normal       Nose: Nose normal       Mouth/Throat:      Mouth: Mucous membranes are moist       Pharynx: Oropharynx is clear  Eyes:      Extraocular Movements: Extraocular movements intact  Conjunctiva/sclera: Conjunctivae normal       Pupils: Pupils are equal, round, and reactive to light  Neck:      Musculoskeletal: Normal range of motion and neck supple  No muscular tenderness  Cardiovascular:      Rate and Rhythm: Normal rate and regular rhythm  Pulses: Normal pulses  Heart sounds: Normal heart sounds  No murmur  Pulmonary:      Effort: Pulmonary effort is normal  No respiratory distress  Breath sounds: Normal breath sounds  No wheezing or rales  Chest:      Chest wall: No tenderness  Abdominal:      General: Abdomen is flat  Bowel sounds are normal       Palpations: Abdomen is soft  Tenderness: There is no abdominal tenderness  There is no right CVA tenderness, left CVA tenderness or guarding  Musculoskeletal: Normal range of motion  General: No swelling  Comments: Diffuse bilateral lower lumbar paraspinal tenderness to palpation  No midline bony tenderness or step off  Right lower paraspinal region with palpable spinal cord stimulator with no exposed leads or overlying skin changes  Skin:     General: Skin is warm and dry  Capillary Refill: Capillary refill takes less than 2 seconds  Neurological:      General: No focal deficit present  Mental Status: She is alert and oriented to person, place, and time  Cranial Nerves: No cranial nerve deficit  Sensory: No sensory deficit  Motor: No weakness  Comments: 4+/5 bilateral UE  strength  5/5 bilateral LE strength on dorsiflexion and plantar flexion  Sensation to light touch grossly intact and symmetric           ED Medications  Medications   LORazepam (ATIVAN) injection 1 mg (1 mg Intravenous Given 4/13/21 1322)   LORazepam (ATIVAN) injection 0 5 mg (0 5 mg Intravenous Given 4/13/21 1459)   acetaminophen (TYLENOL) tablet 650 mg (650 mg Oral Given 4/13/21 1502)   potassium chloride (K-DUR,KLOR-CON) CR tablet 40 mEq (40 mEq Oral Given 4/13/21 1547)       Diagnostic Studies  Results Reviewed     Procedure Component Value Units Date/Time    Comprehensive metabolic panel [[id number]]  (Abnormal) Collected: 04/13/21 1443    Lab Status: Final result Specimen: Blood from Hand, Left Updated: 04/13/21 1534     Sodium 141 mmol/L      Potassium 2 9 mmol/L      Chloride 105 mmol/L      CO2 29 mmol/L      ANION GAP 7 mmol/L      BUN 5 mg/dL      Creatinine 0 71 mg/dL      Glucose 95 mg/dL      Calcium 9 0 mg/dL      AST 13 U/L      ALT 19 U/L      Alkaline Phosphatase 93 U/L      Total Protein 6 9 g/dL      Albumin 3 7 g/dL      Total Bilirubin 1 33 mg/dL      eGFR 109 ml/min/1 73sq m     Narrative:      National Kidney Disease Foundation guidelines for Chronic Kidney Disease (CKD):     Stage 1 with normal or high GFR (GFR > 90 mL/min/1 73 square meters)    Stage 2 Mild CKD (GFR = 60-89 mL/min/1 73 square meters)    Stage 3A Moderate CKD (GFR = 45-59 mL/min/1 73 square meters)    Stage 3B Moderate CKD (GFR = 30-44 mL/min/1 73 square meters)    Stage 4 Severe CKD (GFR = 15-29 mL/min/1 73 square meters)    Stage 5 End Stage CKD (GFR <15 mL/min/1 73 square meters)  Note: GFR calculation is accurate only with a steady state creatinine    Troponin I [[id number]]  (Normal) Collected: 04/13/21 1323    Lab Status: Final result Specimen: Blood from Hand, Right Updated: 04/13/21 1358     Troponin I <0 02 ng/mL     CBC and differential [[id number]]  (Abnormal) Collected: 04/13/21 1323    Lab Status: Final result Specimen: Blood from Hand, Right Updated: 04/13/21 1336     WBC 4 73 Thousand/uL      RBC 5 25 Million/uL      Hemoglobin 15 0 g/dL      Hematocrit 43 9 %      MCV 84 fL      MCH 28 6 pg      MCHC 34 2 g/dL      RDW 13 5 %      MPV 9 7 fL      Platelets 462 Thousands/uL      nRBC 0 /100 WBCs      Neutrophils Relative 57 %      Immat GRANS % 0 %      Lymphocytes Relative 29 %      Monocytes Relative 11 %      Eosinophils Relative 2 %      Basophils Relative 1 %      Neutrophils Absolute 2 73 Thousands/µL      Immature Grans Absolute 0 01 Thousand/uL      Lymphocytes Absolute 1 36 Thousands/µL      Monocytes Absolute 0 51 Thousand/µL      Eosinophils Absolute 0 07 Thousand/µL      Basophils Absolute 0 05 Thousands/µL                  XR chest 1 view portable   ED Interpretation by Oumar Gasca MD (04/13 2617)   Spinal hardware  No acute cardiopulmonary disease        Final Result by Annalise Garces MD (04/13 1529)      No acute cardiopulmonary disease  Workstation performed: [id number]               Procedures  Procedures      ED Course  ED Course as of Apr 16 1736   Tue Apr 13, 2021   1344 Procedure Note: EKG  Date/Time: 04/13/21 1:44 PM   Interpreted by: Daya Naylor MD  Indications / Diagnosis: Anxiety, SOB  ECG reviewed by me, the ED Physician: yes   The EKG demonstrates:  Rhythm: normal sinus rhythm 76 bpm  Intervals: normal intervals  Axis: rightward axis  QRS/Blocks: normal QRS  ST Changes: No acute ST Changes, no STD/SKY  No significant change compared to prior EKG 9/4/2020       1543 Plan to replete orally, with additional home doses for the next 2 days  Potassium(!): 2 9                                       MDM  Number of Diagnoses or Management Options  Anxiety:   Back pain:   Hyperventilation syndrome:   Hypokalemia:   Diagnosis management comments: 61 y/o female with hx of anxiety, HTN, obesity, chronic back pain, back surgeries, and s/p spinal cord stimulator placement presents to the ED c/o anxiety, SOB, and bilateral hand numbness that started after she woke from sleep this morning  She states that she has experienced similar symptoms in the past with anxiety attacks  She also reports gradually worsening chronic back pain over the last several weeks despite spinal cord stimulator  She denies fever, chills, cough, chest pain, abdominal pain, N/V/D, urinary symptoms, headache, and focal weakness  On exam she is afebrile, VSS, with diffuse bilateral lower lumbar paraspinal tenderness to palpation  No midline bony tenderness or step off  Right lower paraspinal region with palpable spinal cord stimulator with no exposed leads or overlying skin changes  4+/5 bilateral UE  strength  5/5 bilateral LE strength on dorsiflexion and plantar flexion  Sensation to light touch grossly intact and symmetric   Patient's description of bilateral hand tingling and cramping consistent with hyperventilation  Patient given Ativan with improvement of symptoms  CBC, troponin, CXR, and EKG normal  CMP with hypokalemia 2 9  Patient given oral potassium and prescription for 2 day course of oral potassium repletion, with instructions to follow up with PCP  Discussed findings, treatment, red flags/return precautions, and outpatient PCP and neurosurgery follow up and the patient understands and agrees  Disposition  Final diagnoses:   Anxiety   Hyperventilation syndrome   Back pain   Hypokalemia     Time reflects when diagnosis was documented in both MDM as applicable and the Disposition within this note     Time User Action Codes Description Comment    4/13/2021  3:49 PM Luster Halter Add [F41 9] Anxiety     4/13/2021  3:49 PM Luster Halter Add [F45 8] Hyperventilation syndrome     4/13/2021  3:49 PM Luster Halter Add [M54 9] Back pain     4/13/2021  3:49 PM Luster Halter Add [E87 6] Hypokalemia       ED Disposition     ED Disposition Condition Date/Time Comment    Discharge Stable Tue Apr 13, 2021  3:48 PM Samantha Rodriguez discharge to home/self care              Follow-up Information     Follow up With Specialties Details Why Contact Info Additional [address] [facility] Call  As needed, for follow up [place] [address] [address] [facility] [address]  [facility] [address], [address], [address] [phone]          Discharge Medication List as of 4/13/2021  3:50 PM      START taking these medications    Details   ferrous sulfate 324 (65 Fe) mg TAKE 1 TABLET (324 MG TOTAL) BY MOUTH 2 (TWO) TIMES A DAY BEFORE MEALS, Starting Tue 4/13/2021, Normal      potassium chloride (K-DUR,KLOR-CON) 10 mEq tablet Take 2 tablets (20 mEq total) by mouth 2 (two) times a day for 2 days, Starting Tue 4/13/2021, Until Thu 4/15/2021, Normal         CONTINUE these medications which have NOT CHANGED    Details   acetaminophen (TYLENOL) 325 mg tablet Take 2 tablets (650 mg total) by mouth every 8 (eight) hours as needed for mild pain, Starting Tue 3/9/2021, Normal      amLODIPine (NORVASC) 5 mg tablet Take 1 tablet (5 mg total) by mouth daily, Starting Wed 3/25/2020, Until Wed 2/24/2021, Normal      bisacodyl (DULCOLAX) 5 mg EC tablet Take 5 mg by mouth daily as needed for constipation, Historical Med      busPIRone (BUSPAR) 15 mg tablet Take 1 tablet (15 mg total) by mouth 3 (three) times a day, Starting Wed 3/17/2021, Until Fri 4/16/2021, Normal      cetirizine (ZyrTEC) 10 mg tablet Take 10 mg by mouth as needed , Starting Thu 11/19/2020, Historical Med      cholecalciferol (VITAMIN D3) 1,000 units tablet Take 1 tablet (1,000 Units total) by mouth daily, Starting Tue 8/4/2020, Until Wed 2/24/2021, Normal      diclofenac sodium (VOLTAREN) 1 % APPLY 4 G TOPICALLY 4 (FOUR) TIMES A DAY, Starting Wed 8/5/2020, Normal      hydrOXYzine HCL (ATARAX) 50 mg tablet Take 1 tablet (50 mg total) by mouth daily at bedtime as needed for anxiety (insomnia), Starting Wed 3/17/2021, Normal      ipratropium-albuterol (DUO-NEB) 0 5-2 5 mg/3 mL nebulizer solution Take 1 vial (3 mL total) by nebulization every 6 (six) hours as needed for wheezing or shortness of breath, Starting Mon 3/29/2021, Print      lidocaine (LMX) 4 % cream Apply topically as needed for mild pain, Starting Thu 7/16/2020, Normal      LORazepam (ATIVAN) 0 5 mg tablet Take 1 tablet (0 5 mg total) by mouth every 8 (eight) hours as needed for anxiety for up to 10 days Hold if sedated, Starting Wed 9/9/2020, Until Sat 9/19/2020, Print      morphine (MS CONTIN) 15 mg 12 hr tablet Take 1 tablet (15 mg total) by mouth 2 (two) times a day Hold if sedatedMax Daily Amount: 30 mg, Starting Mon 3/29/2021, Normal      Mouthwashes (Biotene Dry Mouth) LIQD Apply 5 mL to the mouth or throat daily, Historical Med      Multiple Vitamins-Minerals (multivitamin with minerals) tablet Take 1 tablet by mouth daily, Starting Tue 8/18/2020, Normal      naloxone (NARCAN) 4 mg/0 1 mL nasal spray Administer 1 spray into a nostril   If no response after 2-3 minutes, give another dose in the other nostril using a new spray , Normal      omeprazole (PriLOSEC) 20 mg delayed release capsule Take 1 capsule (20 mg total) by mouth daily, Starting Wed 3/17/2021, Until Tue 6/15/2021, Normal      ondansetron (ZOFRAN-ODT) 4 mg disintegrating tablet Take 1 tablet (4 mg total) by mouth every 6 (six) hours as needed for nausea or vomiting, Starting Thu 9/17/2020, Print      oxybutynin (DITROPAN) 5 mg tablet Take 1 tablet (5 mg total) by mouth 2 (two) times a day, Starting Wed 3/17/2021, Until Tue 6/15/2021, Normal      oxyCODONE (ROXICODONE) 5 mg immediate release tablet Take 1 tablet (5 mg total) by mouth dailyMax Daily Amount: 5 mg, Starting Mon 3/29/2021, Normal      Polyethylene Glycol 3350 (MIRALAX PO) Take by mouth, Historical Med      prazosin (MINIPRESS) 2 mg capsule TAKE 1 CAPSULE BY MOUTH EVERY DAY, Normal      !! pregabalin (LYRICA) 150 mg capsule Take 1 capsule (150 mg total) by mouth 3 (three) times a day, Starting Wed 6/17/2020, Normal      !! pregabalin (LYRICA) 225 MG capsule Take 1 capsule (225 mg total) by mouth every 12 (twelve) hours, Starting Tue 3/9/2021, Normal      propranolol (INDERAL) 20 mg tablet Take 1 tablet (20 mg total) by mouth 2 (two) times a day, Starting Wed 3/17/2021, Until Tue 6/15/2021, Normal      Respiratory Therapy Supplies (Nebulizer) YUDY Use as needed (Shortness of breath), Starting Mon 3/29/2021, Print      Sennosides (SENEXON PO) Take by mouth, Historical Med      SIMETHICONE PO Take by mouth as needed , Historical Med      sodium chloride (OCEAN) 0 65 % nasal spray 2 sprays into each nostril as needed, Historical Med      triamcinolone (KENALOG) 0 025 % cream Apply topically 2 (two) times a day, Starting Thu 7/16/2020, Normal      Valbenazine Tosylate (Ingrezza) 80 MG CAPS Take 80 mg by mouth daily, Starting Wed 3/17/2021, Normal      ascorbic acid (VITAMIN C) 500 MG tablet Take 1 tablet (500 mg total) by mouth 2 (two) times a day, Starting Wed 3/17/2021, Normal      DULoxetine (CYMBALTA) 60 mg delayed release capsule Take 1 capsule (60 mg total) by mouth daily, Starting Wed 1/04/9985, Normal      folic acid (FOLVITE) 1 mg tablet Take 1 tablet (1 mg total) by mouth daily, Starting Wed 3/17/2021, Normal       !! - Potential duplicate medications found  Please discuss with provider  No discharge procedures on file  PDMP Review       Value Time User    PDMP Reviewed  Yes 9/5/2020 12:53 PM Milagros Tiwari MD           ED Provider  Attending physically available and evaluated Samantha Ellis  I managed the patient along with the ED Attending      Electronically Signed by         Stacy Carias MD  04/16/21 2103

## 2021-04-14 RX ORDER — DULOXETIN HYDROCHLORIDE 60 MG/1
CAPSULE, DELAYED RELEASE ORAL
Qty: 30 CAPSULE | Refills: 0 | Status: SHIPPED | OUTPATIENT
Start: 2021-04-14 | End: 2021-08-05 | Stop reason: HOSPADM

## 2021-04-14 RX ORDER — LORATADINE 10 MG
TABLET ORAL
Qty: 60 TABLET | Refills: 0 | Status: SHIPPED | OUTPATIENT
Start: 2021-04-14 | End: 2022-02-28 | Stop reason: CLARIF

## 2021-04-14 RX ORDER — FOLIC ACID 1 MG/1
TABLET ORAL
Qty: 30 TABLET | Refills: 1 | Status: SHIPPED | OUTPATIENT
Start: 2021-04-14 | End: 2022-02-28 | Stop reason: SDUPTHER

## 2021-04-15 ENCOUNTER — TELEPHONE (OUTPATIENT)
Dept: INTERNAL MEDICINE CLINIC | Facility: CLINIC | Age: 56
End: 2021-04-15

## 2021-04-15 DIAGNOSIS — R26.2 AMBULATORY DYSFUNCTION: Primary | ICD-10-CM

## 2021-04-15 NOTE — TELEPHONE ENCOUNTER
Spoke with patient  Patient complaint of having heavy sweating, feeling hot  Patient denies having chest pain, shortness of breath, headaches, numbness or tingling sensation, no swelling  Per patient she already went yesterday 4/14 to the ED because she was feeling anxious  Per patient her daughter check her blood pressure at 3:30 pm 160/11 and 3:50 pm 165/111  Patient state she forgot to take her blood pressure medication this morning  I offer an appt to come in today for an evaluation but she said need to find who can bring her to clinic  I told her the only appt we have available is at 4:30 pm and she state know can't make it on time due to transportation  I advise her she need to take her blood pressure medication and remain in calm at least for an hour to let the medication work and recheck her blood pressure  If the blood pressure is increasing or remain the same she can go to urgent care for an evaluation because we close at 4:30 pm and she should not wait until tomorrow  Patient state she have an BP machine but doesn't where is it  Per patient she have [facility] one block away were she lives and would ask her daughter If can take her there  Patient verbalized understanding

## 2021-04-15 NOTE — TELEPHONE ENCOUNTER
Patient called with in home OT, she is having elevated bp  164/111,  she is having some dizziness and chest pain and excessive sweating  OT took her bp 3 times,   ? Left Arm- 164/111,  165/11  Right Arm- 165/112    Call transferred to Donal Obregon MA for further triage

## 2021-04-15 NOTE — TELEPHONE ENCOUNTER
Patient called with Occupational Therapist, patient needs a script for a Shower Transfer Bench and Raised Toilet Seat    Please print the scripts, have the attending cosign and fax the scripts along with demographics and last office note to:    [facility]  Fax- [phone]    Dx- Osteoarthritis of knees      Patient is scheduled to see Dr Parviz Mckeon on 4/20/21

## 2021-04-16 ENCOUNTER — TELEPHONE (OUTPATIENT)
Dept: INTERNAL MEDICINE CLINIC | Facility: CLINIC | Age: 56
End: 2021-04-16

## 2021-04-16 ENCOUNTER — HOSPITAL ENCOUNTER (EMERGENCY)
Facility: HOSPITAL | Age: 56
Discharge: HOME | End: 2021-04-16
Attending: EMERGENCY MEDICINE
Payer: COMMERCIAL

## 2021-04-16 ENCOUNTER — PATIENT OUTREACH (OUTPATIENT)
Dept: INTERNAL MEDICINE CLINIC | Facility: CLINIC | Age: 56
End: 2021-04-16

## 2021-04-16 VITALS
HEART RATE: 84 BPM | DIASTOLIC BLOOD PRESSURE: 80 MMHG | BODY MASS INDEX: 40.06 KG/M2 | WEIGHT: 212 LBS | OXYGEN SATURATION: 99 % | TEMPERATURE: 97.5 F | SYSTOLIC BLOOD PRESSURE: 115 MMHG | RESPIRATION RATE: 20 BRPM

## 2021-04-16 DIAGNOSIS — E87.6 HYPOKALEMIA: Primary | ICD-10-CM

## 2021-04-16 DIAGNOSIS — G47.00 INSOMNIA: ICD-10-CM

## 2021-04-16 LAB
ANION GAP SERPL CALCULATED.3IONS-SCNC: 8 MMOL/L (ref 4–13)
BUN SERPL-MCNC: 8 MG/DL (ref 5–25)
CALCIUM SERPL-MCNC: 9 MG/DL (ref 8.3–10.1)
CHLORIDE SERPL-SCNC: 104 MMOL/L (ref 100–108)
CO2 SERPL-SCNC: 26 MMOL/L (ref 21–32)
CREAT SERPL-MCNC: 1.08 MG/DL (ref 0.6–1.3)
GFR SERPL CREATININE-BSD FRML MDRD: 66 ML/MIN/1.73SQ M
GLUCOSE SERPL-MCNC: 102 MG/DL (ref 65–140)
POTASSIUM SERPL-SCNC: 3.2 MMOL/L (ref 3.5–5.3)
SODIUM SERPL-SCNC: 138 MMOL/L (ref 136–145)

## 2021-04-16 PROCEDURE — 99283 EMERGENCY DEPT VISIT LOW MDM: CPT

## 2021-04-16 PROCEDURE — 80048 BASIC METABOLIC PNL TOTAL CA: CPT | Performed by: EMERGENCY MEDICINE

## 2021-04-16 PROCEDURE — 36415 COLL VENOUS BLD VENIPUNCTURE: CPT | Performed by: EMERGENCY MEDICINE

## 2021-04-16 PROCEDURE — 99284 EMERGENCY DEPT VISIT MOD MDM: CPT | Performed by: EMERGENCY MEDICINE

## 2021-04-16 RX ORDER — NORTRIPTYLINE HYDROCHLORIDE 10 MG/1
10 CAPSULE ORAL
Qty: 7 CAPSULE | Refills: 0 | Status: SHIPPED | OUTPATIENT
Start: 2021-04-16 | End: 2021-07-07 | Stop reason: HOSPADM

## 2021-04-16 RX ORDER — POTASSIUM CHLORIDE 20 MEQ/1
40 TABLET, EXTENDED RELEASE ORAL ONCE
Status: DISCONTINUED | OUTPATIENT
Start: 2021-04-16 | End: 2021-04-16

## 2021-04-16 RX ORDER — ASENAPINE 5 MG/1
5 TABLET SUBLINGUAL 2 TIMES DAILY
Status: DISCONTINUED | OUTPATIENT
Start: 2021-04-16 | End: 2021-04-16

## 2021-04-16 RX ORDER — ASENAPINE 5 MG/1
5 TABLET SUBLINGUAL ONCE
Status: COMPLETED | OUTPATIENT
Start: 2021-04-16 | End: 2021-04-16

## 2021-04-16 RX ADMIN — ASENAPINE MALEATE 5 MG: 5 TABLET SUBLINGUAL at 19:51

## 2021-04-16 RX ADMIN — ASENAPINE MALEATE 5 MG: 5 TABLET SUBLINGUAL at 18:50

## 2021-04-16 NOTE — ED ATTENDING ATTESTATION
4/16/2021  Santa Brandon DO, saw and evaluated the patient  I have discussed the patient with the resident/non-physician practitioner and agree with the resident's/non-physician practitioner's findings, Plan of Care, and MDM as documented in the resident's/non-physician practitioner's note, except where noted  All available labs and Radiology studies were reviewed  I was present for key portions of any procedure(s) performed by the resident/non-physician practitioner and I was immediately available to provide assistance  At this point I agree with the current assessment done in the Emergency Department  I have conducted an independent evaluation of this patient a history and physical is as follows:    59 yo female referred to ED for evaluation of nausea, whole body pain, and difficulty sleeping due to #2  Was sent in by visiting nurse due to symptoms and reportedly elevated BP  BP here is 113/86  Pt has no s/s stroke, CHF, ACS  Of note pt was here a few days ago for anxiety  Noted to have mild hypokalemia  It is unclear if pt has been taking supplementation as directed  Will check BMP, tx symptoms  For asymptomatic HTN see below:        The patient arrives without symptoms of hypertension, specifically denying chest pain, shortness of breath, persistent headaches  The patient has a PCP and appropriate follow-up  As patient has asymptomatic hypertension, we will discharge this patient home for close follow-up with their PCP  This is supported by 1 [facility] 5452: Heena Munoz MD, Heather ,   Clinical policy: critical issues in the evaluation and management of adult patients in the emergency department with asymptomatic elevated blood pressure  Annals of emergency medicine  2013; 62(1):59-68   [pubmed]    As well as ACC/AHA Guidelines for HTN in adults 2017:    Virgie DEL VALLE, Neli Grant, Anyi WS, et al  2017 ACC/AHA/AAPA/ABC/ACPM/AGS/APhA/MELISSA/ASPC/NMA/PCNA Guideline for the Prevention, Detection, Evaluation, and Management of High Blood Pressure in Adults: A Report of the [employer] of Cardiology/American Heart Association Task Force on Clinical Practice Guidelines  Circulation  2018; 138(17):h532-z166  [pubmed]    Figure from section 11 2 ACC/AHA guidelines below:        Of note, rapidly lowering blood pressure in asymptomatic patients may be associated with TIA  I added the following to the asymptomatic hypertension dot phrase  Of note, rapidly lowering blood pressure in asymptomatic patients may be associated with TIA  Cerebrovascular risks with rapid blood pressure lowering in the absence of hypertensive emergency  Trang Marie, et [address] [facility] 2019;37(6):2507-2129  This was a prospective observation study with severe asymptomatic hypertension in whom the treating doctor was planning to administer rapid active IV anti-hypertensive  n=39 were connected to transcranial Doppler measuring at the MCA before/after IV anti-hypertensive  It demonstrated an average of 18% decrease in MAP with 10% decrease in cerebral-blood-flow velocity  Among patients with larger decrease in MAP, the decrease in cerebral blood flow was still approximately 10%  2 patients developed neurologic symptoms        ED Course         Critical Care Time  Procedures

## 2021-04-16 NOTE — PROGRESS NOTES
CHW spoke to patient on this date to find out if we are all set up with the apt for [address] [place] [address]  Per patient she is very excited to have the Interview to get on the [address] [facility]   She is aware she needs to bring me a copy of her Social Security card and she is also aware pt needs to meet me at the [address] [place] [address]       Pt also stated she received a letter from the [facility] [place] and she is currently on the Wait list      CHW will continue to work w/ pt and communicate w/ team

## 2021-04-16 NOTE — TELEPHONE ENCOUNTER
Patient's in home physical therapist Trinidad Edmondson called stating patient BP is reading 139/105   Triaged call to [facility]

## 2021-04-16 NOTE — TELEPHONE ENCOUNTER
Patient's in home physical therapist Rebekah Campoverde called stating patient BP is reading 139/105   Triaged call to Vinicio Rangel

## 2021-04-16 NOTE — ED PROVIDER NOTES
History  Chief Complaint   Patient presents with    High Blood Pressure     pt seen yesterday by visiting nurse and told she has htn, c/o headache and lightheadedness today  75-year-old female history of chronic pain syndrome status post spinal cord stimulator placement, bipolar, migraines, panic attacks, PTSD presents for concern of high blood pressure  Blood pressure normal on arrival   Blood pressure per home nurse was normal today  Blood pressure yesterday was 109 systolic  Patient primarily complains of not being able to sleep last night  Patient states that she is worried about her blood pressure and a possible stroke  Patient also states that she cannot sleep secondary to her chronic pain syndrome  Patient has been increasing her spinal stimulator secondary to instruction from her pain doctor  Patient is taking oxycodone and morphine  Patient denies new neurological deficits  Seen in the emergency department 3 days ago for panic attack, hyperventilation, tingling in hands  Found to be hypokalemic 2 9  Patient's home dosage of potassium was increased on discharge  Prior to Admission Medications   Prescriptions Last Dose Informant Patient Reported? Taking?    [facility] Vitamin C 500 MG tablet 4/16/2021 at Unknown time  No Yes   Sig: TAKE 1 TABLET BY MOUTH TWICE A DAY   DULoxetine (CYMBALTA) 60 mg delayed release capsule 4/16/2021 at Unknown time  No Yes   Sig: TAKE 1 CAPSULE BY MOUTH EVERY DAY   LORazepam (ATIVAN) 0 5 mg tablet   No No   Sig: Take 1 tablet (0 5 mg total) by mouth every 8 (eight) hours as needed for anxiety for up to 10 days Hold if sedated   Patient not taking: Reported on 11/27/2020   Mouthwashes (Biotene Dry Mouth) LIQD 4/16/2021 at Unknown time  Yes Yes   Sig: Apply 5 mL to the mouth or throat daily   Multiple Vitamins-Minerals (multivitamin with minerals) tablet 4/16/2021 at Unknown time Outside Facility (Specify) No Yes   Sig: Take 1 tablet by mouth daily Polyethylene Glycol 3350 (MIRALAX PO) 4/16/2021 at Unknown time Outside Facility ([address] [place] [address]) Yes Yes   Sig: Take by mouth   Respiratory Therapy Supplies (Nebulizer) YUDY 4/16/2021 at Unknown time  No Yes   Sig: Use as needed (Shortness of breath)   SIMETHICONE PO 4/16/2021 at Unknown time Outside Facility (Specify) Yes Yes   Sig: Take by mouth as needed    Sennosides (SENEXON PO) 4/16/2021 at Unknown time Outside Facility ([address] [place] [address]) Yes Yes   Sig: Take by mouth   Valbenazine Tosylate (Ingrezza) 80 MG CAPS 4/16/2021 at Unknown time  No Yes   Sig: Take 80 mg by mouth daily   acetaminophen (TYLENOL) 325 mg tablet 4/16/2021 at Unknown time  No Yes   Sig: Take 2 tablets (650 mg total) by mouth every 8 (eight) hours as needed for mild pain   amLODIPine (NORVASC) 5 mg tablet  Outside Facility (Specify) No No   Sig: Take 1 tablet (5 mg total) by mouth daily   bisacodyl (DULCOLAX) 5 mg EC tablet 4/16/2021 at Unknown time Outside Facility ([address] [place] [address]) Yes Yes   Sig: Take 5 mg by mouth daily as needed for constipation   busPIRone (BUSPAR) 15 mg tablet 4/16/2021 at Unknown time  No Yes   Sig: Take 1 tablet (15 mg total) by mouth 3 (three) times a day   cetirizine (ZyrTEC) 10 mg tablet 4/16/2021 at Unknown time Outside Facility ([address] [place] [address]) Yes Yes   Sig: Take 10 mg by mouth as needed    cholecalciferol (VITAMIN D3) 1,000 units tablet  Outside Facility (Specify) No No   Sig: Take 1 tablet (1,000 Units total) by mouth daily   diclofenac sodium (VOLTAREN) 1 % 4/16/2021 at Unknown time Outside Facility (Specify) No Yes   Sig: APPLY 4 G TOPICALLY 4 (FOUR) TIMES A DAY   Patient taking differently: Apply 4 g topically 2 (two) times a day    ferrous sulfate 324 (65 Fe) mg 4/16/2021 at Unknown time  No Yes   Sig: TAKE 1 TABLET (324 MG TOTAL) BY MOUTH 2 (TWO) TIMES A DAY BEFORE MEALS   folic acid (FOLVITE) 1 mg tablet 4/16/2021 at Unknown time  No Yes   Sig: TAKE 1 TABLET BY MOUTH EVERY DAY   hydrOXYzine HCL (ATARAX) 50 mg tablet   No No   Sig: Take 1 tablet (50 mg total) by mouth daily at bedtime as needed for anxiety (insomnia)   ipratropium-albuterol (DUO-NEB) 0 5-2 5 mg/3 mL nebulizer solution 4/16/2021 at Unknown time  No Yes   Sig: Take 1 vial (3 mL total) by nebulization every 6 (six) hours as needed for wheezing or shortness of breath   lidocaine (LMX) 4 % cream Not Taking at Unknown time Outside Facility (Specify) No No   Sig: Apply topically as needed for mild pain   Patient not taking: Reported on 2/24/2021   morphine (MS CONTIN) 15 mg 12 hr tablet Not Taking at Unknown time  No No   Sig: Take 1 tablet (15 mg total) by mouth 2 (two) times a day Hold if sedatedMax Daily Amount: 30 mg   Patient not taking: Reported on 4/16/2021   naloxone (NARCAN) 4 mg/0 1 mL nasal spray 4/16/2021 at Unknown time  No Yes   Sig: Administer 1 spray into a nostril  If no response after 2-3 minutes, give another dose in the other nostril using a new spray     omeprazole (PriLOSEC) 20 mg delayed release capsule 4/16/2021 at Unknown time  No Yes   Sig: Take 1 capsule (20 mg total) by mouth daily   ondansetron (ZOFRAN-ODT) 4 mg disintegrating tablet Not Taking at Unknown time Outside Facility (Specify) No No   Sig: Take 1 tablet (4 mg total) by mouth every 6 (six) hours as needed for nausea or vomiting   Patient not taking: Reported on 2/24/2021   oxyCODONE (ROXICODONE) 5 mg immediate release tablet 4/16/2021 at Unknown time  No Yes   Sig: Take 1 tablet (5 mg total) by mouth dailyMax Daily Amount: 5 mg   oxybutynin (DITROPAN) 5 mg tablet 4/16/2021 at Unknown time  No Yes   Sig: Take 1 tablet (5 mg total) by mouth 2 (two) times a day   potassium chloride (K-DUR,KLOR-CON) 10 mEq tablet   No No   Sig: Take 2 tablets (20 mEq total) by mouth 2 (two) times a day for 2 days   prazosin (MINIPRESS) 2 mg capsule 4/16/2021 at Unknown time Outside Facility (Specify) No Yes   Sig: TAKE 1 CAPSULE BY MOUTH EVERY DAY   pregabalin (LYRICA) 150 mg capsule 4/16/2021 at Unknown time Outside Facility (Specify) No Yes   Sig: Take 1 capsule (150 mg total) by mouth 3 (three) times a day   pregabalin (LYRICA) 225 MG capsule 2021 at Unknown time  No Yes   Sig: Take 1 capsule (225 mg total) by mouth every 12 (twelve) hours   propranolol (INDERAL) 20 mg tablet 2021 at Unknown time  No Yes   Sig: Take 1 tablet (20 mg total) by mouth 2 (two) times a day   sodium chloride (OCEAN) 0 65 % nasal spray 2021 at Unknown time  Yes Yes   Si sprays into each nostril as needed   triamcinolone (KENALOG) 0 025 % cream 2021 at Unknown time Outside Facility (Specify) No Yes   Sig: Apply topically 2 (two) times a day      Facility-Administered Medications: None       Past Medical History:   Diagnosis Date    Acid reflux     Anxiety     RESOLVED: [id number]    Arthritis     Bipolar 2 disorder (HCC)     FOLLOWS WITH PSYCHIATRIST  CONTINUE LAMOTRIGINE; RESOLVED: [id number]    Depression     Familial tremor     both hands    Fibromyalgia     LAST ASSESSED: [id number]    Hearing aid worn     left ear    [place] (hard of hearing)     left ear    Hypertension     Left-sided weakness     Lower back pain     Memory loss of unknown cause     long and short term    Migraine     Obesity     Overactive bladder     Panic attack     Post traumatic stress disorder     Seasonal allergies     Stroke [facility])     questionable stroke 2009    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    CONT ASA 81 MG DAILY; RESOLVED: [id number]    Urinary incontinence     Wears dentures     partial lower / full upper    Wears glasses        Past Surgical History:   Procedure Laterality Date    BACK SURGERY       SECTION      COLONOSCOPY      RESOLVED: [id number]    EAR SURGERY      EGD      HYSTERECTOMY      MYRINGOTOMY W/ TUBES Left     NECK SURGERY  2019    VT CYSTOURETHROSCOPY N/A 2016    Procedure: CYSTOSCOPY, BOTOX INJECTION;  Surgeon: Vijaya Boo MD;  Location: [facility];  Service: Gynecology    LA IMPLANT SPINAL NEUROSTIM/ Right 2/10/2021    Procedure: REPLACEMENT IMPLANTABLE PULSE GENERATOR DORSAL SPINAL COLUMN STIMULATOR, RIGHT;  Surgeon: Sabrina Cerna MD;  Location:  MAIN OR;  Service: Neurosurgery    LA PERCUT IMPLNT Noel Perez Right 7/28/2020    Procedure: INSERTION THORACIC DORSAL COLUMN SPINAL CORD STIMULATOR PERCUTANEOUS W IMPLANTABLE PULSE GENERATOR, RIGHT;  Surgeon: Sabrina Cerna MD;  Location:  MAIN OR;  Service: Neurosurgery   [facility]    UPPER GASTROINTESTINAL ENDOSCOPY  09/2020       Family History   Problem Relation Age of Onset    Colon cancer Mother     Alzheimer's disease Father     Stroke Father     Colon cancer Brother     Bipolar disorder Brother     Breast cancer Maternal Aunt     Colon cancer Maternal Aunt     Heart disease Other     Diabetes Other     Hypertension Other     Seizures Son     Depression Paternal Grandfather     No Known Problems Sister     No Known Problems Brother     Thyroid disease Neg Hx      I have reviewed and agree with the history as documented  E-Cigarette/Vaping    E-Cigarette Use Never User      E-Cigarette/Vaping Substances    Nicotine No     THC No     CBD No     Flavoring No     Other No     Unknown No      Social History     Tobacco Use    Smoking status: Never Smoker    Smokeless tobacco: Never Used   Substance Use Topics    Alcohol use: Not Currently     Comment: 2 x year; being a social drinker as per all scripts     Drug use: No        Review of Systems   Constitutional: Positive for chills and fatigue  Musculoskeletal: Positive for arthralgias and myalgias  All other systems reviewed and are negative        Physical Exam  ED Triage Vitals [04/16/21 1608]   Temperature Pulse Respirations Blood Pressure SpO2   97 5 °F (36 4 °C) 80 20 113/86 99 %      Temp Source Heart Rate Source Patient Position - Orthostatic VS BP Location FiO2 (%)   Tympanic Monitor Sitting Left arm --      Pain Score       No Pain             Orthostatic Vital Signs  Vitals:    04/16/21 1608 04/16/21 1910   BP: 113/86 115/80   Pulse: 80 84   Patient Position - Orthostatic VS: Sitting Sitting       Physical Exam  Vitals signs and nursing note reviewed  Constitutional:       General: She is not in acute distress  Appearance: Normal appearance  She is not ill-appearing  HENT:      Head: Normocephalic and atraumatic  Right Ear: External ear normal       Left Ear: External ear normal       Nose: Nose normal       Mouth/Throat:      Mouth: Mucous membranes are moist    Eyes:      General:         Right eye: No discharge  Left eye: No discharge  Conjunctiva/sclera: Conjunctivae normal    Neck:      Musculoskeletal: Normal range of motion  Cardiovascular:      Rate and Rhythm: Normal rate and regular rhythm  Pulses: Normal pulses  Heart sounds: No murmur  Pulmonary:      Effort: Pulmonary effort is normal       Breath sounds: Normal breath sounds  Abdominal:      General: Abdomen is flat  There is no distension  Tenderness: There is no abdominal tenderness  Musculoskeletal: Normal range of motion  Skin:     General: Skin is warm  Capillary Refill: Capillary refill takes less than 2 seconds  Findings: No rash  Neurological:      General: No focal deficit present  Mental Status: She is alert  Mental status is at baseline     Psychiatric:         Mood and Affect: Mood normal          Behavior: Behavior normal          ED Medications  Medications   asenapine (SAPHRIS) SL tablet 5 mg (5 mg Sublingual Given 4/16/21 1951)       Diagnostic Studies  Results Reviewed     Procedure Component Value Units Date/Time    Basic metabolic panel [[id number]]  (Abnormal) Collected: 04/16/21 1850    Lab Status: Final result Specimen: Blood from Arm, Right Updated: 04/16/21 1922     Sodium 138 mmol/L      Potassium 3 2 mmol/L Chloride 104 mmol/L      CO2 26 mmol/L      ANION GAP 8 mmol/L      BUN 8 mg/dL      Creatinine 1 08 mg/dL      Glucose 102 mg/dL      Calcium 9 0 mg/dL      eGFR 66 ml/min/1 73sq m     Narrative:      [place] guidelines for Chronic Kidney Disease (CKD):     Stage 1 with normal or high GFR (GFR > 90 mL/min/1 73 square meters)    Stage 2 Mild CKD (GFR = 60-89 mL/min/1 73 square meters)    Stage 3A Moderate CKD (GFR = 45-59 mL/min/1 73 square meters)    Stage 3B Moderate CKD (GFR = 30-44 mL/min/1 73 square meters)    Stage 4 Severe CKD (GFR = 15-29 mL/min/1 73 square meters)    Stage 5 End Stage CKD (GFR <15 mL/min/1 73 square meters)  Note: GFR calculation is accurate only with a steady state creatinine                 No orders to display         Procedures  Procedures      ED Course  ED Course as of Apr 16 2118 Fri Apr 16, 2021   1942 Potassium(!): 3 2                                       MDM  Number of Diagnoses or Management Options  Hypokalemia: new and requires workup  Insomnia: new and requires workup  Diagnosis management comments: Well-appearing 47-year-old female presenting for sleep disturbances  Patient also worried about her blood pressure  Normal blood pressure on examination  Follow-up with PCP regarding blood pressure  Hypokalemic 3 days ago  Will recheck  Patient mildly hypokalemic  Patient told to follow-up with PCP regarding this and to supplement diet with potassium rich food  Patient concerned complains of chronic pain  Will give 1 dosage of Saphris  Regarding insomnia, will treat with low dosage TCA for 7 days  Return precautions given         Amount and/or Complexity of Data Reviewed  Clinical lab tests: ordered and reviewed  Review and summarize past medical records: yes    Risk of Complications, Morbidity, and/or Mortality  Presenting problems: low  Diagnostic procedures: low  Management options: low    Patient Progress  Patient progress: stable      Disposition  Final diagnoses:   Hypokalemia   Insomnia     Time reflects when diagnosis was documented in both MDM as applicable and the Disposition within this note     Time User Action Codes Description Comment    4/16/2021  7:43 PM Euna Yi Add [E87 6] Hypokalemia     4/16/2021  8:03 PM Euna Yi Add [G47 00] Insomnia       ED Disposition     ED Disposition Condition Date/Time Comment    Discharge Stable Fri Apr 16, 2021  7:42 PM Samantha Rodriguez discharge to home/self care              Follow-up Information     Follow up With Specialties Details Why Contact Info Additional [address] Emergency Department Emergency Medicine  If symptoms worsen [address]  958 [facility] Emergency Department, [address], [facility] 108          Discharge Medication List as of 4/16/2021  8:04 PM      START taking these medications    Details   nortriptyline (PAMELOR) 10 mg capsule Take 1 capsule (10 mg total) by mouth daily at bedtime, Starting Fri 4/16/2021, Normal         CONTINUE these medications which have NOT CHANGED    Details   acetaminophen (TYLENOL) 325 mg tablet Take 2 tablets (650 mg total) by mouth every 8 (eight) hours as needed for mild pain, Starting Tue 3/9/2021, Normal      bisacodyl (DULCOLAX) 5 mg EC tablet Take 5 mg by mouth daily as needed for constipation, Historical Med      busPIRone (BUSPAR) 15 mg tablet Take 1 tablet (15 mg total) by mouth 3 (three) times a day, Starting Wed 3/17/2021, Until Fri 4/16/2021, Normal      cetirizine (ZyrTEC) 10 mg tablet Take 10 mg by mouth as needed , Starting Thu 11/19/2020, Historical Med      [facility] Vitamin C 500 MG tablet TAKE 1 TABLET BY MOUTH TWICE A DAY, Normal      diclofenac sodium (VOLTAREN) 1 % APPLY 4 G TOPICALLY 4 (FOUR) TIMES A DAY, Starting Wed 8/5/2020, Normal      DULoxetine (CYMBALTA) 60 mg delayed release capsule TAKE 1 CAPSULE BY MOUTH EVERY DAY, Normal      ferrous sulfate 324 (65 Fe) mg TAKE 1 TABLET (324 MG TOTAL) BY MOUTH 2 (TWO) TIMES A DAY BEFORE MEALS, Starting Tue 6/36/4146, Normal      folic acid (FOLVITE) 1 mg tablet TAKE 1 TABLET BY MOUTH EVERY DAY, Normal      ipratropium-albuterol (DUO-NEB) 0 5-2 5 mg/3 mL nebulizer solution Take 1 vial (3 mL total) by nebulization every 6 (six) hours as needed for wheezing or shortness of breath, Starting Mon 3/29/2021, Print      Mouthwashes (Biotene Dry Mouth) LIQD Apply 5 mL to the mouth or throat daily, Historical Med      Multiple Vitamins-Minerals (multivitamin with minerals) tablet Take 1 tablet by mouth daily, Starting Tue 8/18/2020, Normal      naloxone (NARCAN) 4 mg/0 1 mL nasal spray Administer 1 spray into a nostril   If no response after 2-3 minutes, give another dose in the other nostril using a new spray , Normal      omeprazole (PriLOSEC) 20 mg delayed release capsule Take 1 capsule (20 mg total) by mouth daily, Starting Wed 3/17/2021, Until Tue 6/15/2021, Normal      oxybutynin (DITROPAN) 5 mg tablet Take 1 tablet (5 mg total) by mouth 2 (two) times a day, Starting Wed 3/17/2021, Until Tue 6/15/2021, Normal      oxyCODONE (ROXICODONE) 5 mg immediate release tablet Take 1 tablet (5 mg total) by mouth dailyMax Daily Amount: 5 mg, Starting Mon 3/29/2021, Normal      Polyethylene Glycol 3350 (MIRALAX PO) Take by mouth, Historical Med      prazosin (MINIPRESS) 2 mg capsule TAKE 1 CAPSULE BY MOUTH EVERY DAY, Normal      !! pregabalin (LYRICA) 150 mg capsule Take 1 capsule (150 mg total) by mouth 3 (three) times a day, Starting Wed 6/17/2020, Normal      !! pregabalin (LYRICA) 225 MG capsule Take 1 capsule (225 mg total) by mouth every 12 (twelve) hours, Starting Tue 3/9/2021, Normal      propranolol (INDERAL) 20 mg tablet Take 1 tablet (20 mg total) by mouth 2 (two) times a day, Starting Wed 3/17/2021, Until Tue 6/15/2021, Normal      Respiratory Therapy Supplies (Nebulizer) YUDY Use as needed (Shortness of breath), Starting Mon 3/29/2021, Print      Sennosides (SENEXON PO) Take by mouth, Historical Med      SIMETHICONE PO Take by mouth as needed , Historical Med      sodium chloride (OCEAN) 0 65 % nasal spray 2 sprays into each nostril as needed, Historical Med      triamcinolone (KENALOG) 0 025 % cream Apply topically 2 (two) times a day, Starting Thu 7/16/2020, Normal      Valbenazine Tosylate (Ingrezza) 80 MG CAPS Take 80 mg by mouth daily, Starting Wed 3/17/2021, Normal      amLODIPine (NORVASC) 5 mg tablet Take 1 tablet (5 mg total) by mouth daily, Starting Wed 3/25/2020, Until Wed 2/24/2021, Normal      cholecalciferol (VITAMIN D3) 1,000 units tablet Take 1 tablet (1,000 Units total) by mouth daily, Starting Tue 8/4/2020, Until Wed 2/24/2021, Normal      hydrOXYzine HCL (ATARAX) 50 mg tablet Take 1 tablet (50 mg total) by mouth daily at bedtime as needed for anxiety (insomnia), Starting Wed 3/17/2021, Normal      lidocaine (LMX) 4 % cream Apply topically as needed for mild pain, Starting Thu 7/16/2020, Normal      LORazepam (ATIVAN) 0 5 mg tablet Take 1 tablet (0 5 mg total) by mouth every 8 (eight) hours as needed for anxiety for up to 10 days Hold if sedated, Starting Wed 9/9/2020, Until Sat 9/19/2020, Print      morphine (MS CONTIN) 15 mg 12 hr tablet Take 1 tablet (15 mg total) by mouth 2 (two) times a day Hold if sedatedMax Daily Amount: 30 mg, Starting Mon 3/29/2021, Normal      ondansetron (ZOFRAN-ODT) 4 mg disintegrating tablet Take 1 tablet (4 mg total) by mouth every 6 (six) hours as needed for nausea or vomiting, Starting Thu 9/17/2020, Print      potassium chloride (K-DUR,KLOR-CON) 10 mEq tablet Take 2 tablets (20 mEq total) by mouth 2 (two) times a day for 2 days, Starting Tue 4/13/2021, Until Thu 4/15/2021, Normal       !! - Potential duplicate medications found  Please discuss with provider  No discharge procedures on file      PDMP Review       Value Time User    PDMP Reviewed  Yes 9/5/2020 12:53 PM Cee Mijares MD           ED Provider  Attending physically available and evaluated Samantha Leon I managed the patient along with the ED Attending      Electronically Signed by         Sara Goddard DO  04/16/21 1902

## 2021-04-17 NOTE — DISCHARGE INSTRUCTIONS
Added potassium rich foods such as bananas, orange juice, potatoes to your diet  Follow-up with primary care provider soon as possible  Take the nortriptyline nightly for the next 7 days to decrease your insomnia and pain

## 2021-04-19 ENCOUNTER — TELEPHONE (OUTPATIENT)
Dept: NEUROSURGERY | Facility: CLINIC | Age: 56
End: 2021-04-19

## 2021-04-20 ENCOUNTER — PATIENT OUTREACH (OUTPATIENT)
Dept: INTERNAL MEDICINE CLINIC | Facility: CLINIC | Age: 56
End: 2021-04-20

## 2021-04-20 ENCOUNTER — OFFICE VISIT (OUTPATIENT)
Dept: INTERNAL MEDICINE CLINIC | Facility: CLINIC | Age: 56
End: 2021-04-20

## 2021-04-20 VITALS
DIASTOLIC BLOOD PRESSURE: 84 MMHG | TEMPERATURE: 97.9 F | HEART RATE: 71 BPM | WEIGHT: 209 LBS | SYSTOLIC BLOOD PRESSURE: 124 MMHG | BODY MASS INDEX: 39.49 KG/M2

## 2021-04-20 DIAGNOSIS — G89.4 CHRONIC PAIN DISORDER: ICD-10-CM

## 2021-04-20 DIAGNOSIS — G89.29 CHRONIC BILATERAL LOW BACK PAIN WITH BILATERAL SCIATICA: ICD-10-CM

## 2021-04-20 DIAGNOSIS — M54.16 LUMBAR RADICULOPATHY: ICD-10-CM

## 2021-04-20 DIAGNOSIS — F41.9 ANXIETY: ICD-10-CM

## 2021-04-20 DIAGNOSIS — M54.42 CHRONIC BILATERAL LOW BACK PAIN WITH BILATERAL SCIATICA: ICD-10-CM

## 2021-04-20 DIAGNOSIS — M54.41 CHRONIC BILATERAL LOW BACK PAIN WITH BILATERAL SCIATICA: ICD-10-CM

## 2021-04-20 DIAGNOSIS — I10 ESSENTIAL HYPERTENSION: Primary | ICD-10-CM

## 2021-04-20 DIAGNOSIS — F41.1 GENERALIZED ANXIETY DISORDER: ICD-10-CM

## 2021-04-20 DIAGNOSIS — N39.41 URGE INCONTINENCE OF URINE: ICD-10-CM

## 2021-04-20 DIAGNOSIS — E87.6 HYPOKALEMIA: ICD-10-CM

## 2021-04-20 DIAGNOSIS — Z12.31 ENCOUNTER FOR SCREENING MAMMOGRAM FOR MALIGNANT NEOPLASM OF BREAST: ICD-10-CM

## 2021-04-20 DIAGNOSIS — R26.2 AMBULATORY DYSFUNCTION: ICD-10-CM

## 2021-04-20 PROCEDURE — 99213 OFFICE O/P EST LOW 20 MIN: CPT | Performed by: INTERNAL MEDICINE

## 2021-04-20 PROCEDURE — 3079F DIAST BP 80-89 MM HG: CPT | Performed by: INTERNAL MEDICINE

## 2021-04-20 PROCEDURE — 3074F SYST BP LT 130 MM HG: CPT | Performed by: INTERNAL MEDICINE

## 2021-04-20 PROCEDURE — 1036F TOBACCO NON-USER: CPT | Performed by: INTERNAL MEDICINE

## 2021-04-20 RX ORDER — QUETIAPINE FUMARATE 300 MG/1
300 TABLET, FILM COATED ORAL
COMMUNITY
End: 2021-07-07 | Stop reason: HOSPADM

## 2021-04-20 RX ORDER — POTASSIUM CHLORIDE 750 MG/1
10 CAPSULE, EXTENDED RELEASE ORAL 2 TIMES DAILY
Status: ON HOLD | COMMUNITY
End: 2021-07-07 | Stop reason: SDUPTHER

## 2021-04-20 RX ORDER — TRAZODONE HYDROCHLORIDE 100 MG/1
100 TABLET ORAL
COMMUNITY
End: 2021-07-07 | Stop reason: HOSPADM

## 2021-04-20 RX ORDER — MORPHINE SULFATE 15 MG/1
15 TABLET, FILM COATED, EXTENDED RELEASE ORAL 2 TIMES DAILY
Qty: 30 TABLET | Refills: 0 | Status: SHIPPED | OUTPATIENT
Start: 2021-04-20 | End: 2021-06-04 | Stop reason: SDUPTHER

## 2021-04-20 RX ORDER — OXYCODONE HYDROCHLORIDE 5 MG/1
5 TABLET ORAL DAILY
Qty: 15 TABLET | Refills: 0 | Status: SHIPPED | OUTPATIENT
Start: 2021-04-20 | End: 2021-05-21 | Stop reason: SDUPTHER

## 2021-04-20 NOTE — ASSESSMENT & PLAN NOTE
·  Upon recheck of patient's blood pressure noted to be 124/84  · No need to change blood pressure medications at this time

## 2021-04-20 NOTE — ASSESSMENT & PLAN NOTE
·  Will check magnesium, urine potassium, and BMP   · Patient does need further evaluation for ongoing hypokalemia    This should be followed up at next visit

## 2021-04-20 NOTE — TELEPHONE ENCOUNTER
Name of medication, dose, quantity and frequency    Requested Prescriptions     Pending Prescriptions Disp Refills    morphine (MS CONTIN) 15 mg 12 hr tablet 30 tablet 0     Sig: Take 1 tablet (15 mg total) by mouth 2 (two) times a day Hold if sedatedMax Daily Amount: 30 mg     Amount of medication left:    Pharmacy verified and updated-yes    Additional information:

## 2021-04-20 NOTE — TELEPHONE ENCOUNTER
Refill would have been due 4/13/21  PDMP checked      Patient was in the office for appt today and advised she needs to follow up with pain management as advised at her last appt  She has not done that  She then called in after she left the office for refill  When I spoke to her she stated she isnt seeing pain management anymore  I advised her she needs to establish care with pain management  She became very irate stating she cant believe we are stopping her medication although I did not say that and I tried to explain to her that I was trying to get information so I can discuss with Dr Eusebia Romero who recommended tapering during last office visit on 3/9/2021  Per PDMP review she should have been out of Oxycodone and Morphine a week ago on 4/12/21  She stated she takes it every day as prescribed and doesn't miss any doses  I asked her what she has been taking for the last week and she stated the Oxycodone and Morphine  When I advised again that she should have been out a week ago she said I was wrong

## 2021-04-20 NOTE — PROGRESS NOTES
ASSESSMENT/PLAN:  Problem List Items Addressed This Visit        Cardiovascular and Mediastinum    Hypertension - Primary     ·  Upon recheck of patient's blood pressure noted to be 124/84  · No need to change blood pressure medications at this time            Nervous and Auditory    Chronic bilateral low back pain with bilateral sciatica     ·  Patient needs to follow-up with acute pain in regards to her pain medications            Other    Anxiety     ·  Patient on multiple different anti-anxiety  Medications  · Advised patient she needs to follow up with Psychiatry in regards to these medications  · Do feel there is polypharmacy with patient medications at this time         Hypokalemia     ·  Will check magnesium, urine potassium, and BMP   · Patient does need further evaluation for ongoing hypokalemia  This should be followed up at next visit          Relevant Orders    Magnesium    Potassium W/Creatinine, Random Urine    Basic metabolic panel    Urinary incontinence    Relevant Medications    Diapers & Supplies (Huggies Pull-Ups) MISC    Ambulatory dysfunction    Relevant Orders    Shower chair    Elevated toilet seat      Other Visit Diagnoses     Encounter for screening mammogram for malignant neoplasm of breast        Relevant Orders    Mammo screening bilateral w 3d & cad          Health Maintenance:  · Mammogram ordered  ·  did discuss with patient she needs to call Gastroenterology office to have colonoscopy carried out      CHIEF COMPLAINT: ER follow up     HISTORY OF PRESENT ILLNESS:  56yo F with PMH significant for obstructive sleep apnea, chronic pain/fibromyalgia on morphine/oxycodone, lumbar radiculopathy s/p spinal cord stimulator, osteoarthritis, GERD, hypertension, migraine, obesity, PTSD, Bipolar, and anxiety  Patient coming in today with multiple complaints including anxiety, blood pressure, urinary incontinence, and pain    Did discuss with patient that given she has multiple complaints today will only be able to addressed a couple and she can follow-up closely with PCP in a couple weeks  In regards to patient's urinary incontinence she states that she had previously had adult  pull-ups and was asking for a refill in regards to these  Patient denied any symptoms such as headache, lightheadedness, dizziness, or blurry vision and states she does have visiting nursing who does check her blood pressures at home  Also patient denied any symptoms during elevated blood pressure when she had went to the emergency department although blood pressure was normal then  In regards to patient's pain I advised her that she needs to follow this up with pain management as she is on multiple medications to help with her pain currently  Review of Systems   All other systems reviewed and are negative  OBJECTIVE:  Vitals:    04/20/21 0853 04/20/21 0935   BP: 141/96 124/84   BP Location: Left arm    Patient Position: Sitting    Cuff Size: Large    Pulse: 71    Temp: 97 9 °F (36 6 °C)    TempSrc: Temporal    Weight: 94 8 kg (209 lb)      Physical Exam  Vitals signs and nursing note reviewed  Constitutional:       General: She is not in acute distress  Appearance: She is well-developed  HENT:      Head: Normocephalic and atraumatic  Eyes:      General: No scleral icterus  Conjunctiva/sclera: Conjunctivae normal    Cardiovascular:      Rate and Rhythm: Normal rate and regular rhythm  Heart sounds: Normal heart sounds  No murmur  No friction rub  No gallop  Pulmonary:      Effort: Pulmonary effort is normal  No respiratory distress  Breath sounds: Normal breath sounds  No wheezing or rales  Abdominal:      General: Bowel sounds are normal  There is no distension  Palpations: Abdomen is soft  Tenderness: There is no abdominal tenderness  Musculoskeletal: Normal range of motion  Skin:     General: Skin is warm  Findings: No rash     Neurological:      Mental Status: She is alert and oriented to person, place, and time  Current Outpatient Medications:     acetaminophen (TYLENOL) 325 mg tablet, Take 2 tablets (650 mg total) by mouth every 8 (eight) hours as needed for mild pain, Disp: 60 tablet, Rfl: 2    amLODIPine (NORVASC) 5 mg tablet, Take 1 tablet (5 mg total) by mouth daily, Disp: 90 tablet, Rfl: 3    busPIRone (BUSPAR) 15 mg tablet, Take 1 tablet (15 mg total) by mouth 3 (three) times a day, Disp: 90 tablet, Rfl: 0    [employer] Vitamin C 500 MG tablet, TAKE 1 TABLET BY MOUTH TWICE A DAY, Disp: 60 tablet, Rfl: 0    diclofenac sodium (VOLTAREN) 1 %, APPLY 4 G TOPICALLY 4 (FOUR) TIMES A DAY (Patient taking differently: Apply 4 g topically 2 (two) times a day ), Disp: 100 g, Rfl: 0    DULoxetine (CYMBALTA) 60 mg delayed release capsule, TAKE 1 CAPSULE BY MOUTH EVERY DAY, Disp: 30 capsule, Rfl: 0    ferrous sulfate 324 (65 Fe) mg, TAKE 1 TABLET (324 MG TOTAL) BY MOUTH 2 (TWO) TIMES A DAY BEFORE MEALS, Disp: 60 tablet, Rfl: 1    folic acid (FOLVITE) 1 mg tablet, TAKE 1 TABLET BY MOUTH EVERY DAY, Disp: 30 tablet, Rfl: 1    hydrOXYzine HCL (ATARAX) 50 mg tablet, Take 1 tablet (50 mg total) by mouth daily at bedtime as needed for anxiety (insomnia), Disp: 90 tablet, Rfl: 0    Multiple Vitamins-Minerals (multivitamin with minerals) tablet, Take 1 tablet by mouth daily, Disp: 30 tablet, Rfl: 1    naloxone (NARCAN) 4 mg/0 1 mL nasal spray, Administer 1 spray into a nostril   If no response after 2-3 minutes, give another dose in the other nostril using a new spray , Disp: 1 each, Rfl: 1    nortriptyline (PAMELOR) 10 mg capsule, Take 1 capsule (10 mg total) by mouth daily at bedtime, Disp: 7 capsule, Rfl: 0    omeprazole (PriLOSEC) 20 mg delayed release capsule, Take 1 capsule (20 mg total) by mouth daily, Disp: 90 capsule, Rfl: 3    oxybutynin (DITROPAN) 5 mg tablet, Take 1 tablet (5 mg total) by mouth 2 (two) times a day, Disp: 180 tablet, Rfl: 3    oxyCODONE (ROXICODONE) 5 mg immediate release tablet, Take 1 tablet (5 mg total) by mouth dailyMax Daily Amount: 5 mg, Disp: 15 tablet, Rfl: 0    potassium chloride (MICRO-K) 10 MEQ CR capsule, Take 10 mEq by mouth 2 (two) times a day Two tabs, two times daily, Disp: , Rfl:     prazosin (MINIPRESS) 2 mg capsule, TAKE 1 CAPSULE BY MOUTH EVERY DAY, Disp: 30 capsule, Rfl: 1    pregabalin (LYRICA) 225 MG capsule, Take 1 capsule (225 mg total) by mouth every 12 (twelve) hours, Disp: 60 capsule, Rfl: 0    propranolol (INDERAL) 20 mg tablet, Take 1 tablet (20 mg total) by mouth 2 (two) times a day, Disp: 180 tablet, Rfl: 3    QUEtiapine (SEROquel) 300 mg tablet, Take 300 mg by mouth daily at bedtime, Disp: , Rfl:     SIMETHICONE PO, Take by mouth as needed , Disp: , Rfl:     sodium chloride (OCEAN) 0 65 % nasal spray, 2 sprays into each nostril as needed, Disp: , Rfl:     traZODone (DESYREL) 100 mg tablet, Take 100 mg by mouth daily at bedtime 2 tablets daily at bedtime, Disp: , Rfl:     Valbenazine Tosylate (Ingrezza) 80 MG CAPS, Take 80 mg by mouth daily, Disp: 30 capsule, Rfl: 1    bisacodyl (DULCOLAX) 5 mg EC tablet, Take 5 mg by mouth daily as needed for constipation, Disp: , Rfl:     cetirizine (ZyrTEC) 10 mg tablet, Take 10 mg by mouth as needed , Disp: , Rfl:     cholecalciferol (VITAMIN D3) 1,000 units tablet, Take 1 tablet (1,000 Units total) by mouth daily, Disp: 90 tablet, Rfl: 5    Diapers & Supplies (Huggies Pull-Ups) MISC, Use 3 (three) times a day, Disp: 100 each, Rfl: 3    ipratropium-albuterol (DUO-NEB) 0 5-2 5 mg/3 mL nebulizer solution, Take 1 vial (3 mL total) by nebulization every 6 (six) hours as needed for wheezing or shortness of breath (Patient not taking: Reported on 4/20/2021), Disp: 3 mL, Rfl: 2    lidocaine (LMX) 4 % cream, Apply topically as needed for mild pain (Patient not taking: Reported on 2/24/2021), Disp: 30 g, Rfl: 0    LORazepam (ATIVAN) 0 5 mg tablet, Take 1 tablet (0 5 mg total) by mouth every 8 (eight) hours as needed for anxiety for up to 10 days Hold if sedated (Patient not taking: Reported on 11/27/2020), Disp: 20 tablet, Rfl: 0    morphine (MS CONTIN) 15 mg 12 hr tablet, Take 1 tablet (15 mg total) by mouth 2 (two) times a day Hold if sedatedMax Daily Amount: 30 mg (Patient not taking: Reported on 4/16/2021), Disp: 30 tablet, Rfl: 0    Mouthwashes (Biotene Dry Mouth) LIQD, Apply 5 mL to the mouth or throat daily, Disp: , Rfl:     ondansetron (ZOFRAN-ODT) 4 mg disintegrating tablet, Take 1 tablet (4 mg total) by mouth every 6 (six) hours as needed for nausea or vomiting (Patient not taking: Reported on 2/24/2021), Disp: 20 tablet, Rfl: 0    Polyethylene Glycol 3350 (MIRALAX PO), Take by mouth, Disp: , Rfl:     potassium chloride (K-DUR,KLOR-CON) 10 mEq tablet, Take 2 tablets (20 mEq total) by mouth 2 (two) times a day for 2 days, Disp: 8 tablet, Rfl: 0    pregabalin (LYRICA) 150 mg capsule, Take 1 capsule (150 mg total) by mouth 3 (three) times a day (Patient not taking: Reported on 4/20/2021), Disp: 90 capsule, Rfl: 2    Respiratory Therapy Supplies (Nebulizer) YUDY, Use as needed (Shortness of breath) (Patient not taking: Reported on 4/20/2021), Disp: 1 each, Rfl: 0    Sennosides (SENEXON PO), Take by mouth, Disp: , Rfl:     triamcinolone (KENALOG) 0 025 % cream, Apply topically 2 (two) times a day (Patient not taking: Reported on 4/20/2021), Disp: 30 g, Rfl: 0    Past Medical History:   Diagnosis Date    Acid reflux     Anxiety     RESOLVED: [id number]    Arthritis     Bipolar 2 disorder (HCC)     FOLLOWS WITH PSYCHIATRIST   CONTINUE LAMOTRIGINE; RESOLVED: [id number]    Depression     Familial tremor     both hands    Fibromyalgia     LAST ASSESSED: [id number]    Hearing aid worn     left ear    [place] (hard of hearing)     left ear    Hypertension     Left-sided weakness     Lower back pain     Memory loss of unknown cause     long and short term    Migraine  Obesity     Overactive bladder     Panic attack     Post traumatic stress disorder     Seasonal allergies     Stroke [facility])     questionable stroke 2009    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    CONT ASA 81 MG DAILY; RESOLVED: [id number]    Urinary incontinence     Wears dentures     partial lower / full upper    Wears glasses      Past Surgical History:   Procedure Laterality Date    BACK SURGERY       SECTION  1986    COLONOSCOPY      RESOLVED: [id number]    EAR SURGERY      EGD      HYSTERECTOMY  2004    MYRINGOTOMY W/ TUBES Left     NECK SURGERY  2019    TN CYSTOURETHROSCOPY N/A 2016    Procedure: CYSTOSCOPY, BOTOX INJECTION;  Surgeon: Orie Sicard, MD;  Location: [facility];  Service: Gynecology    TN IMPLANT SPINAL NEUROSTIM/ Right 2/10/2021    Procedure: REPLACEMENT IMPLANTABLE PULSE GENERATOR DORSAL SPINAL COLUMN STIMULATOR, RIGHT;  Surgeon: Sissy Shaw MD;  [facility];  Service: Neurosurgery    TN PERCUT IMPLNT [address] Right 2020    Procedure: INSERTION THORACIC DORSAL COLUMN SPINAL CORD STIMULATOR PERCUTANEOUS W IMPLANTABLE PULSE GENERATOR, RIGHT;  Surgeon: Sissy Shaw MD;  [facility];  Service: Neurosurgery    TONSILLECTOMY      TUBAL LIGATION      UPPER GASTROINTESTINAL ENDOSCOPY  2020     Social History     Socioeconomic History    Marital status:      Spouse name: Not on file    Number of children: 2    Years of education: graduate school     Highest education level: Not on file   Occupational History    Occupation: Disabled   Social Needs    Financial resource strain: Somewhat hard    Food insecurity     Worry: Never true     Inability: Never true    Transportation needs     Medical: No     Non-medical: No   Tobacco Use    Smoking status: Never Smoker    Smokeless tobacco: Never Used   Substance and Sexual Activity    Alcohol use: Not Currently     Comment: 2 x year; being a social drinker as per all scripts     Drug use: No    Sexual activity: Not Currently   Lifestyle    Physical activity     Days per week: 0 days     Minutes per session: 0 min    Stress: Not on file   Relationships    Social connections     Talks on phone: More than three times a week     Gets together: More than three times a week     Attends Orthodox service: More than 4 times per year     Active member of club or organization: No     Attends meetings of clubs or organizations: Never     Relationship status:     Intimate partner violence     Fear of current or ex partner: No     Emotionally abused: No     Physically abused: No     Forced sexual activity: No   Other Topics Concern    Not on file   Social History Narrative    Bereavement    Daily caffeine consumption, 6-8 servings per day     as per all scripts    Lives in [place]      Family History   Problem Relation Age of Onset    Colon cancer Mother     Alzheimer's disease Father     Stroke Father     Colon cancer Brother     Bipolar disorder Brother     Breast cancer Maternal Aunt     Colon cancer Maternal Aunt     Heart disease Other     Diabetes Other     Hypertension Other     Seizures Son     Depression Paternal Grandfather     No Known Problems Sister     No Known Problems Brother     Thyroid disease Neg Hx        DO Re Salgado Internal Medicine PGY-3  [facility]  [address] [facility]  [address] [facility],[address]  [facility], [address] [place] [address]

## 2021-04-20 NOTE — PROGRESS NOTES
SW has met with pt at PCP appointment this date   Pt is s/p recent ED visits  Pt does relates has been having anxiety attacks and is planning on f/u with her OP [address] Provider [address] [place] [address] @  and [facility]  HIRAM assisted with a call to their office to confirm as pt was not sure when it was but relates she had it written down at home  SW did help pt call [phone] and they confirm pt is scheduled for a Virtual Visit via the phone on Monday 4/26/21 at [facility] has written this down for pt as a reminder  They also confirmed that they are planning to refer pt for an ICM  Pt relates that [facility] is active and she is getting around with  her roller walker but it is difficult for her son's home due to it's set up and narrow hallways  Pt relates she has asked MH for prescriptions for a showerchair and elevated toilet seat  She relates her [facility] provider is helping her obtain same  SW has inquired how is she managing her self care  She relates it is a little difficult but she is managing her own bathing and dressing as well as managing her medications  She did have them with her this date  She relates she takes them herself and did not want to change her pharmacy as the one the use is very close and family picks them up for her  She denies any falls at home  She reports she his using the micro wave and toaster to heat up food that her son prepares  Pt does relate she is interested in a referral to [employer] and SW has faxed referral to [facility] this date as confirmation received  Pt does rlte sheis working on re-establishing  her SNAP benefits since she dis home from the Nursing Home  Sw has reviewed with pt the PA Waiver program to see if she would like to be evaluated for same  Pt declines same as she feels she is managing   SW reminded her the [facility] will negro be temporary with her son working so many hours she will be home alone a lot  Pt again declined      CHW has been helping pt to submit various housing applicatons and Isaac Griffith RN/HUNTER has aslo been following  SW/RN/CHW will remain available to assist as indicted

## 2021-04-20 NOTE — TELEPHONE ENCOUNTER
Patient made aware script sent to the pharmacy and that she must come into the office in the next two weeks to sign a narcotics contract  (see other refill task) Patient verbalized understanding

## 2021-04-20 NOTE — TELEPHONE ENCOUNTER
Patient made aware she needs to come into the office in the next two weeks to sign a narcotics contract   Pt verbalized understanding

## 2021-04-20 NOTE — TELEPHONE ENCOUNTER
Refill would have been due 4/13/21  PDMP checked      Patient was in the office for appt today and advised she needs to follow up with pain management as advised at her last appt  She has not done that  She then called in after she left the office for refill  When I spoke to her she stated she isnt seeing pain management anymore  I advised her she needs to establish care with pain management  She became very irate stating she cant believe we are stopping her medication although I did not say that and I tried to explain to her that I was trying to get information so I can discuss with Dr Deborah Pascual who recommended tapering during last office visit on 3/9/2021  Per PDMP review she should have been out of Oxycodone and Morphine a week ago on 4/12/21  She stated she takes it every day as prescribed and doesn't miss any doses  I asked her what she has been taking for the last week and she stated the Oxycodone and Morphine  When I advised again that she should have been out a week ago she said I was wrong

## 2021-04-20 NOTE — TELEPHONE ENCOUNTER
Inform pt will refill, but she needs to sign a CSMA and adhere to the rules set in this, including pill counts, drug testing, etc  Failure to do so will result in discontinuation of opioids  Dr Lele Mcadams

## 2021-04-20 NOTE — TELEPHONE ENCOUNTER
Name of medication, dose, quantity and frequency    Requested Prescriptions     Pending Prescriptions Disp Refills    oxyCODONE (ROXICODONE) 5 mg immediate release tablet 15 tablet 0     Sig: Take 1 tablet (5 mg total) by mouth dailyMax Daily Amount: 5 mg     Number of refills left:    Amount of medication left:    Pharmacy verified and updated-Yes    Additional information:

## 2021-04-21 NOTE — TELEPHONE ENCOUNTER
I see that the elevated toilet seat was ordered but not the shower transfer bench  Patient does not need a shower chair  Please issue correct script, have the attending cosign and fax as requested

## 2021-04-21 NOTE — TELEPHONE ENCOUNTER
Patient called requesting the shower transfer bench order be placed  I see the shower chair was placed & that is incorrect  I faxed the elevated seat in the meantime    Please review

## 2021-04-22 ENCOUNTER — PATIENT OUTREACH (OUTPATIENT)
Dept: INTERNAL MEDICINE CLINIC | Facility: CLINIC | Age: 56
End: 2021-04-22

## 2021-04-22 NOTE — PROGRESS NOTES
HIRAM has received message from [facility] [address] that he needs to confirm pt address and get her permission to make referral to [facility]  HIRAM did reach out to pt to attempt to make a 3 way call but had to leave a message for pt    SW did return Mr Ceci Urbina call and updated her address and informed him HIRAM asked her to return his call  SW to remain avavialbe to assist as indicted

## 2021-04-25 ENCOUNTER — HOSPITAL ENCOUNTER (EMERGENCY)
Facility: HOSPITAL | Age: 56
Discharge: HOME | End: 2021-04-25
Admitting: EMERGENCY MEDICINE
Payer: COMMERCIAL

## 2021-04-25 VITALS
RESPIRATION RATE: 18 BRPM | DIASTOLIC BLOOD PRESSURE: 93 MMHG | HEART RATE: 80 BPM | WEIGHT: 209 LBS | OXYGEN SATURATION: 99 % | BODY MASS INDEX: 39.49 KG/M2 | TEMPERATURE: 97.7 F | SYSTOLIC BLOOD PRESSURE: 145 MMHG

## 2021-04-25 DIAGNOSIS — G89.29 CHRONIC BACK PAIN: ICD-10-CM

## 2021-04-25 DIAGNOSIS — F41.9 ANXIETY: Primary | ICD-10-CM

## 2021-04-25 DIAGNOSIS — M54.9 CHRONIC BACK PAIN: ICD-10-CM

## 2021-04-25 PROCEDURE — 96372 THER/PROPH/DIAG INJ SC/IM: CPT

## 2021-04-25 PROCEDURE — 99283 EMERGENCY DEPT VISIT LOW MDM: CPT

## 2021-04-25 PROCEDURE — 99284 EMERGENCY DEPT VISIT MOD MDM: CPT | Performed by: PHYSICIAN ASSISTANT

## 2021-04-25 RX ORDER — LORAZEPAM 2 MG/ML
1 INJECTION INTRAMUSCULAR ONCE
Status: COMPLETED | OUTPATIENT
Start: 2021-04-25 | End: 2021-04-25

## 2021-04-25 RX ORDER — KETOROLAC TROMETHAMINE 30 MG/ML
15 INJECTION, SOLUTION INTRAMUSCULAR; INTRAVENOUS ONCE
Status: COMPLETED | OUTPATIENT
Start: 2021-04-25 | End: 2021-04-25

## 2021-04-25 RX ADMIN — KETOROLAC TROMETHAMINE 15 MG: 30 INJECTION, SOLUTION INTRAMUSCULAR at 15:24

## 2021-04-25 RX ADMIN — LORAZEPAM 1 MG: 2 INJECTION INTRAMUSCULAR; INTRAVENOUS at 14:35

## 2021-04-25 NOTE — DISCHARGE INSTRUCTIONS
Follow up with mental health specialist tomorrow as planned  Do not mix ativan and oxycodone at home

## 2021-04-26 ENCOUNTER — PATIENT OUTREACH (OUTPATIENT)
Dept: INTERNAL MEDICINE CLINIC | Facility: CLINIC | Age: 56
End: 2021-04-26

## 2021-04-26 NOTE — PROGRESS NOTES
CHW called patient to confirm she will be meeting at the [employer] today at 2:pm for an interview to get on the [facility]      Per daughter Faby Orn and Samantha they will be meeting me there  CHW completed a Social Security Application for pt  CHW will obtain Signature for form today and obtain original ID to mail out to [employer]  The Application has not been mailed out today because daughter wants to have another opportunity to keep looking for her moms Social Security Card  Pt is also aware she needs to update the PA ID  The ID  on 2021  CHW have told patient to update the PA ID because it has already   CHW will continue to work with pt and communicate w/ team      Addendum:   CHW met with pt on this date at the [address] [place] [address]  Both patient and daughter were very please with the location and all of the amenities the building has to offer  Disability Paperwork was faxed to the [facility] for doctor to complete and return back to [address] [place] [address]  The only item missing is the [employer]  Daughter is still looking for it otherwise we need to obtain a new SS card

## 2021-04-27 ENCOUNTER — HOSPITAL ENCOUNTER (EMERGENCY)
Facility: HOSPITAL | Age: 56
Discharge: HOME | End: 2021-04-27
Attending: EMERGENCY MEDICINE | Admitting: EMERGENCY MEDICINE
Payer: COMMERCIAL

## 2021-04-27 VITALS
DIASTOLIC BLOOD PRESSURE: 92 MMHG | RESPIRATION RATE: 18 BRPM | SYSTOLIC BLOOD PRESSURE: 160 MMHG | TEMPERATURE: 98.5 F | OXYGEN SATURATION: 99 % | HEART RATE: 102 BPM

## 2021-04-27 DIAGNOSIS — F41.9 ANXIETY: Primary | ICD-10-CM

## 2021-04-27 PROCEDURE — 99284 EMERGENCY DEPT VISIT MOD MDM: CPT | Performed by: EMERGENCY MEDICINE

## 2021-04-27 PROCEDURE — 93005 ELECTROCARDIOGRAM TRACING: CPT

## 2021-04-27 PROCEDURE — 99283 EMERGENCY DEPT VISIT LOW MDM: CPT

## 2021-04-27 RX ORDER — HYDROXYZINE HYDROCHLORIDE 25 MG/1
25 TABLET, FILM COATED ORAL ONCE
Status: COMPLETED | OUTPATIENT
Start: 2021-04-27 | End: 2021-04-27

## 2021-04-27 RX ADMIN — HYDROXYZINE HYDROCHLORIDE 25 MG: 25 TABLET, FILM COATED ORAL at 22:48

## 2021-04-28 ENCOUNTER — TELEPHONE (OUTPATIENT)
Dept: INTERNAL MEDICINE CLINIC | Facility: CLINIC | Age: 56
End: 2021-04-28

## 2021-04-28 DIAGNOSIS — G89.4 CHRONIC PAIN DISORDER: ICD-10-CM

## 2021-04-28 DIAGNOSIS — M54.16 LUMBAR RADICULOPATHY: ICD-10-CM

## 2021-04-28 LAB
ATRIAL RATE: 93 BPM
P AXIS: 65 DEGREES
PR INTERVAL: 134 MS
QRS AXIS: 107 DEGREES
QRSD INTERVAL: 82 MS
QT INTERVAL: 372 MS
QTC INTERVAL: 462 MS
T WAVE AXIS: 14 DEGREES
VENTRICULAR RATE: 93 BPM

## 2021-04-28 PROCEDURE — 93010 ELECTROCARDIOGRAM REPORT: CPT | Performed by: INTERNAL MEDICINE

## 2021-04-28 NOTE — TELEPHONE ENCOUNTER
Jaylan Jennings from [address] called to let her doctor aware that patient keeps going to ER and she is not showing up for her physical therapy appointments  They are not sure what else they can do at this point

## 2021-04-28 NOTE — ED PROVIDER NOTES
History  Chief Complaint   Patient presents with    Panic Attack     reports having anxiety and suffering from a panic attack with unknown cause  reports her dr increased her medication dose but she has not had the opportunity to pick it up  denies si/hi/ah/vh     HPI  Patient is a 68-year-old female history of bipolar disorder, anxiety, depression, chronic back pain with spinal stimulator presenting for evaluation of anxiety  Patient states that for the last 5 hours she has been having a sensation of chest tightness and hyperventilation consistent with prior anxiety attacks, states that she was sitting at home watching TV when it started, believes that she has been more anxious due to her chronic back pain and ongoing adjustments to her spinal stimulator, denies true chest pain, palpitations, diaphoresis, nausea, vomiting, syncope or near syncope, feels that symptoms are gradually improving in the emergency department  Patient had recent outpatient appointment with psychiatrist at which time her Seroquel dosage was adjusted, patient additionally taking duloxetine and buspirone with modest symptomatic relief  Patient denies SI/HI/AH/VH  Prior to Admission Medications   Prescriptions Last Dose Informant Patient Reported? Taking?    [facility] Vitamin C 500 MG tablet   No No   Sig: TAKE 1 TABLET BY MOUTH TWICE A DAY   DULoxetine (CYMBALTA) 60 mg delayed release capsule   No No   Sig: TAKE 1 CAPSULE BY MOUTH EVERY DAY   Diapers & Supplies (Huggies Pull-Ups) MISC   No No   Sig: Use 3 (three) times a day   LORazepam (ATIVAN) 0 5 mg tablet   No No   Sig: Take 1 tablet (0 5 mg total) by mouth every 8 (eight) hours as needed for anxiety for up to 10 days Hold if sedated   Patient not taking: Reported on 11/27/2020   Mouthwashes (Biotene Dry Mouth) LIQD   Yes No   Sig: Apply 5 mL to the mouth or throat daily   Multiple Vitamins-Minerals (multivitamin with minerals) tablet  Outside Facility (Specify) No No   Sig: Take 1 tablet by mouth daily   Polyethylene Glycol 3350 (MIRALAX PO)  Outside Facility (Specify) Yes No   Sig: Take by mouth   QUEtiapine (SEROquel) 300 mg tablet  Self Yes No   Sig: Take 300 mg by mouth daily at bedtime   Respiratory Therapy Supplies (Nebulizer) YUDY   No No   Sig: Use as needed (Shortness of breath)   Patient not taking: Reported on 4/20/2021   SIMETHICONE PO  Outside Facility (Specify) Yes No   Sig: Take by mouth as needed    Sennosides (SENEXON PO)  Outside Facility (Specify) Yes No   Sig: Take by mouth   Valbenazine Tosylate (Ingrezza) 80 MG CAPS   No No   Sig: Take 80 mg by mouth daily   acetaminophen (TYLENOL) 325 mg tablet   No No   Sig: Take 2 tablets (650 mg total) by mouth every 8 (eight) hours as needed for mild pain   amLODIPine (NORVASC) 5 mg tablet  Outside Facility (Specify) No No   Sig: Take 1 tablet (5 mg total) by mouth daily   bisacodyl (DULCOLAX) 5 mg EC tablet  Outside Facility (Specify) Yes No   Sig: Take 5 mg by mouth daily as needed for constipation   busPIRone (BUSPAR) 15 mg tablet   No No   Sig: Take 1 tablet (15 mg total) by mouth 3 (three) times a day   cetirizine (ZyrTEC) 10 mg tablet  Outside Facility (Specify) Yes No   Sig: Take 10 mg by mouth as needed    cholecalciferol (VITAMIN D3) 1,000 units tablet  Outside Facility (Specify) No No   Sig: Take 1 tablet (1,000 Units total) by mouth daily   diclofenac sodium (VOLTAREN) 1 %  Outside Facility (Specify) No No   Sig: APPLY 4 G TOPICALLY 4 (FOUR) TIMES A DAY   Patient taking differently: Apply 4 g topically 2 (two) times a day    ferrous sulfate 324 (65 Fe) mg   No No   Sig: TAKE 1 TABLET (324 MG TOTAL) BY MOUTH 2 (TWO) TIMES A DAY BEFORE MEALS   folic acid (FOLVITE) 1 mg tablet   No No   Sig: TAKE 1 TABLET BY MOUTH EVERY DAY   hydrOXYzine HCL (ATARAX) 50 mg tablet   No No   Sig: Take 1 tablet (50 mg total) by mouth daily at bedtime as needed for anxiety (insomnia)   ipratropium-albuterol (DUO-NEB) 0 5-2 5 mg/3 mL nebulizer solution No No   Sig: Take 1 vial (3 mL total) by nebulization every 6 (six) hours as needed for wheezing or shortness of breath   Patient not taking: Reported on 4/20/2021   lidocaine (LMX) 4 % cream  Outside Facility (Specify) No No   Sig: Apply topically as needed for mild pain   Patient not taking: Reported on 2/24/2021   morphine (MS CONTIN) 15 mg 12 hr tablet   No No   Sig: Take 1 tablet (15 mg total) by mouth 2 (two) times a day Hold if sedatedMax Daily Amount: 30 mg   naloxone (NARCAN) 4 mg/0 1 mL nasal spray   No No   Sig: Administer 1 spray into a nostril  If no response after 2-3 minutes, give another dose in the other nostril using a new spray     nortriptyline (PAMELOR) 10 mg capsule   No No   Sig: Take 1 capsule (10 mg total) by mouth daily at bedtime   omeprazole (PriLOSEC) 20 mg delayed release capsule   No No   Sig: Take 1 capsule (20 mg total) by mouth daily   ondansetron (ZOFRAN-ODT) 4 mg disintegrating tablet  Outside Facility (Specify) No No   Sig: Take 1 tablet (4 mg total) by mouth every 6 (six) hours as needed for nausea or vomiting   Patient not taking: Reported on 2/24/2021   oxyCODONE (ROXICODONE) 5 mg immediate release tablet   No No   Sig: Take 1 tablet (5 mg total) by mouth dailyMax Daily Amount: 5 mg   oxybutynin (DITROPAN) 5 mg tablet   No No   Sig: Take 1 tablet (5 mg total) by mouth 2 (two) times a day   potassium chloride (K-DUR,KLOR-CON) 10 mEq tablet   No No   Sig: Take 2 tablets (20 mEq total) by mouth 2 (two) times a day for 2 days   potassium chloride (MICRO-K) 10 MEQ CR capsule  Self Yes No   Sig: Take 10 mEq by mouth 2 (two) times a day Two tabs, two times daily   prazosin (MINIPRESS) 2 mg capsule  Outside Facility (Specify) No No   Sig: TAKE 1 CAPSULE BY MOUTH EVERY DAY   pregabalin (LYRICA) 150 mg capsule  Outside Facility (Specify) No No   Sig: Take 1 capsule (150 mg total) by mouth 3 (three) times a day   Patient not taking: Reported on 4/20/2021   pregabalin (LYRICA) 225 MG capsule   No No   Sig: Take 1 capsule (225 mg total) by mouth every 12 (twelve) hours   propranolol (INDERAL) 20 mg tablet   No No   Sig: Take 1 tablet (20 mg total) by mouth 2 (two) times a day   sodium chloride (OCEAN) 0 65 % nasal spray   Yes No   Si sprays into each nostril as needed   traZODone (DESYREL) 100 mg tablet  Self Yes No   Sig: Take 100 mg by mouth daily at bedtime 2 tablets daily at bedtime   triamcinolone (KENALOG) 0 025 % cream  Outside Facility (Specify) No No   Sig: Apply topically 2 (two) times a day   Patient not taking: Reported on 2021      Facility-Administered Medications: None       Past Medical History:   Diagnosis Date    Acid reflux     Anxiety     RESOLVED: [id number]    Arthritis     Bipolar 2 disorder (HCC)     FOLLOWS WITH PSYCHIATRIST  CONTINUE LAMOTRIGINE; RESOLVED: [id number]    Depression     Familial tremor     both hands    Fibromyalgia     LAST ASSESSED: [id number]    Hearing aid worn     left ear    [place] (hard of hearing)     left ear    Hypertension     Left-sided weakness     Lower back pain     Memory loss of unknown cause     long and short term    Migraine     Obesity     Overactive bladder     Panic attack     Post traumatic stress disorder     Seasonal allergies     Stroke [facility])     questionable stroke 2009    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    CONT ASA 81 MG DAILY; RESOLVED: [id number]    Urinary incontinence     Wears dentures     partial lower / full upper    Wears glasses        Past Surgical History:   Procedure Laterality Date    BACK SURGERY       SECTION      COLONOSCOPY      RESOLVED: [id number]    EAR SURGERY      EGD      HYSTERECTOMY      MYRINGOTOMY W/ TUBES Left     NECK SURGERY  2019    HI CYSTOURETHROSCOPY N/A 2016    Procedure: CYSTOSCOPY, BOTOX INJECTION;  Surgeon: Mulu Patel MD;  Location: [facility];  Service: Gynecology    HI IMPLANT SPINAL NEUROSTIM/ Right 2/10/2021    Procedure: REPLACEMENT IMPLANTABLE PULSE GENERATOR DORSAL SPINAL COLUMN STIMULATOR, RIGHT;  Surgeon: Latrelle Mcardle, MD;  Location:  MAIN OR;  Service: Neurosurgery    [place] PERCUT IMPLNT [address] Right 7/28/2020    Procedure: INSERTION THORACIC DORSAL COLUMN SPINAL CORD STIMULATOR PERCUTANEOUS W IMPLANTABLE PULSE GENERATOR, RIGHT;  Surgeon: Latrelle Mcardle, MD;  Location:  MAIN OR;  Service: Neurosurgery   [facility]    UPPER GASTROINTESTINAL ENDOSCOPY  09/2020       Family History   Problem Relation Age of Onset    Colon cancer Mother     Alzheimer's disease Father     Stroke Father     Colon cancer Brother     Bipolar disorder Brother     Breast cancer Maternal Aunt     Colon cancer Maternal Aunt     Heart disease Other     Diabetes Other     Hypertension Other     Seizures Son     Depression Paternal Grandfather     No Known Problems Sister     No Known Problems Brother     Thyroid disease Neg Hx      I have reviewed and agree with the history as documented  E-Cigarette/Vaping    E-Cigarette Use Never User      E-Cigarette/Vaping Substances    Nicotine No     THC No     CBD No     Flavoring No     Other No     Unknown No      Social History     Tobacco Use    Smoking status: Never Smoker    Smokeless tobacco: Never Used   Substance Use Topics    Alcohol use: Not Currently     Comment: 2 x year; being a social drinker as per all scripts     Drug use: No        Review of Systems   Constitutional: Negative for chills, fatigue and fever  HENT: Negative for hearing loss  Eyes: Negative for visual disturbance  Respiratory: Positive for chest tightness and shortness of breath  Negative for cough  Cardiovascular: Negative for chest pain, palpitations and leg swelling  Gastrointestinal: Negative for abdominal distention, abdominal pain, constipation, diarrhea, nausea and vomiting     Endocrine: Negative for polydipsia and polyuria  Genitourinary: Negative for dysuria and hematuria  Musculoskeletal: Negative for arthralgias and myalgias  Skin: Negative for color change and rash  Neurological: Negative for dizziness and headaches  Psychiatric/Behavioral: Negative for confusion  Physical Exam  ED Triage Vitals [04/27/21 2155]   Temperature Pulse Respirations Blood Pressure SpO2   98 5 °F (36 9 °C) 102 18 160/92 99 %      Temp src Heart Rate Source Patient Position - Orthostatic VS BP Location FiO2 (%)   -- -- -- -- --      Pain Score       No Pain             Orthostatic Vital Signs  Vitals:    04/27/21 2155   BP: 160/92   Pulse: 102       Physical Exam  Vitals signs reviewed  Constitutional:       General: She is not in acute distress  Appearance: She is well-developed  She is not diaphoretic  Comments: Initially mildly anxious appearing which improved over the course of her stay in the emergency department   HENT:      Head: Normocephalic and atraumatic  Right Ear: External ear normal       Left Ear: External ear normal       Nose: Nose normal       Mouth/Throat:      Pharynx: No oropharyngeal exudate  Eyes:      Pupils: Pupils are equal, round, and reactive to light  Neck:      Musculoskeletal: Normal range of motion  Cardiovascular:      Rate and Rhythm: Normal rate and regular rhythm  Heart sounds: Normal heart sounds  No murmur  No friction rub  No gallop  Comments: Sinus tachycardia to a rate of 102  Pulmonary:      Effort: Pulmonary effort is normal  No respiratory distress  Breath sounds: Normal breath sounds  No wheezing  Comments: No increased work of breathing, lungs clear to auscultation bilaterally without wheezing, satting 99% on room air  Chest:      Chest wall: No tenderness  Abdominal:      General: Bowel sounds are normal  There is no distension  Palpations: Abdomen is soft  There is no mass  Tenderness:  There is no abdominal tenderness  There is no guarding  Musculoskeletal: Normal range of motion  General: No deformity  Lymphadenopathy:      Cervical: No cervical adenopathy  Skin:     General: Skin is warm and dry  Capillary Refill: Capillary refill takes less than 2 seconds  Neurological:      Mental Status: She is alert and oriented to person, place, and time  ED Medications  Medications   hydrOXYzine HCL (ATARAX) tablet 25 mg (25 mg Oral Given 4/27/21 0093)       Diagnostic Studies  Results Reviewed     None                 No orders to display         Procedures  Procedures      ED Course                                       MDM  Number of Diagnoses or Management Options  Anxiety:   Diagnosis management comments: 57-year-old female presenting for anxiety, chest tightness, consistent with prior presentations of anxiety attack, multiple recent ED visits over the course of the past few weeks, mildly tachycardic, otherwise benign examination, treated with Atarax, improvement on reassessment, EKG unremarkable, discharged with return precautions    Patient Progress  Patient progress: improved      Disposition  Final diagnoses:   Anxiety     Time reflects when diagnosis was documented in both MDM as applicable and the Disposition within this note     Time User Action Codes Description Comment    4/27/2021 11:05 PM Huma Andersen [[id number]] Anxiety       ED Disposition     ED Disposition Condition Date/Time Comment    Discharge Stable Tue Apr 27, 2021 11:05 PM Samantha Rodriguez discharge to home/self care              Follow-up Information    None         Discharge Medication List as of 4/27/2021 11:07 PM      CONTINUE these medications which have NOT CHANGED    Details   acetaminophen (TYLENOL) 325 mg tablet Take 2 tablets (650 mg total) by mouth every 8 (eight) hours as needed for mild pain, Starting Tue 3/9/2021, Normal      amLODIPine (NORVASC) 5 mg tablet Take 1 tablet (5 mg total) by mouth daily, Starting Wed 3/25/2020, Until Tue 4/20/2021, Normal      bisacodyl (DULCOLAX) 5 mg EC tablet Take 5 mg by mouth daily as needed for constipation, Historical Med      busPIRone (BUSPAR) 15 mg tablet Take 1 tablet (15 mg total) by mouth 3 (three) times a day, Starting Wed 3/17/2021, Until Tue 4/20/2021, Normal      cetirizine (ZyrTEC) 10 mg tablet Take 10 mg by mouth as needed , Starting Thu 11/19/2020, Historical Med      cholecalciferol (VITAMIN D3) 1,000 units tablet Take 1 tablet (1,000 Units total) by mouth daily, Starting Tue 8/4/2020, Until Wed 2/24/2021, Normal      [facility] Vitamin C 500 MG tablet TAKE 1 TABLET BY MOUTH TWICE A DAY, Normal      Diapers & Supplies (Huggies Pull-Ups) MISC Use 3 (three) times a day, Starting Tue 4/20/2021, Normal      diclofenac sodium (VOLTAREN) 1 % APPLY 4 G TOPICALLY 4 (FOUR) TIMES A DAY, Starting Wed 8/5/2020, Normal      DULoxetine (CYMBALTA) 60 mg delayed release capsule TAKE 1 CAPSULE BY MOUTH EVERY DAY, Normal      ferrous sulfate 324 (65 Fe) mg TAKE 1 TABLET (324 MG TOTAL) BY MOUTH 2 (TWO) TIMES A DAY BEFORE MEALS, Starting Tue 6/06/9981, Normal      folic acid (FOLVITE) 1 mg tablet TAKE 1 TABLET BY MOUTH EVERY DAY, Normal      hydrOXYzine HCL (ATARAX) 50 mg tablet Take 1 tablet (50 mg total) by mouth daily at bedtime as needed for anxiety (insomnia), Starting Wed 3/17/2021, Normal      ipratropium-albuterol (DUO-NEB) 0 5-2 5 mg/3 mL nebulizer solution Take 1 vial (3 mL total) by nebulization every 6 (six) hours as needed for wheezing or shortness of breath, Starting Mon 3/29/2021, Print      lidocaine (LMX) 4 % cream Apply topically as needed for mild pain, Starting Thu 7/16/2020, Normal      LORazepam (ATIVAN) 0 5 mg tablet Take 1 tablet (0 5 mg total) by mouth every 8 (eight) hours as needed for anxiety for up to 10 days Hold if sedated, Starting Wed 9/9/2020, Until Sat 9/19/2020, Print      morphine (MS CONTIN) 15 mg 12 hr tablet Take 1 tablet (15 mg total) by mouth 2 (two) times a day Hold if sedatedMax Daily Amount: 30 mg, Starting Tue 4/20/2021, Normal      Mouthwashes (Biotene Dry Mouth) LIQD Apply 5 mL to the mouth or throat daily, Historical Med      Multiple Vitamins-Minerals (multivitamin with minerals) tablet Take 1 tablet by mouth daily, Starting Tue 8/18/2020, Normal      naloxone (NARCAN) 4 mg/0 1 mL nasal spray Administer 1 spray into a nostril   If no response after 2-3 minutes, give another dose in the other nostril using a new spray , Normal      nortriptyline (PAMELOR) 10 mg capsule Take 1 capsule (10 mg total) by mouth daily at bedtime, Starting Fri 4/16/2021, Normal      omeprazole (PriLOSEC) 20 mg delayed release capsule Take 1 capsule (20 mg total) by mouth daily, Starting Wed 3/17/2021, Until Tue 6/15/2021, Normal      ondansetron (ZOFRAN-ODT) 4 mg disintegrating tablet Take 1 tablet (4 mg total) by mouth every 6 (six) hours as needed for nausea or vomiting, Starting Thu 9/17/2020, Print      oxybutynin (DITROPAN) 5 mg tablet Take 1 tablet (5 mg total) by mouth 2 (two) times a day, Starting Wed 3/17/2021, Until Tue 6/15/2021, Normal      oxyCODONE (ROXICODONE) 5 mg immediate release tablet Take 1 tablet (5 mg total) by mouth dailyMax Daily Amount: 5 mg, Starting Tue 4/20/2021, Normal      Polyethylene Glycol 3350 (MIRALAX PO) Take by mouth, Historical Med      potassium chloride (K-DUR,KLOR-CON) 10 mEq tablet Take 2 tablets (20 mEq total) by mouth 2 (two) times a day for 2 days, Starting Tue 4/13/2021, Until Thu 4/15/2021, Normal      potassium chloride (MICRO-K) 10 MEQ CR capsule Take 10 mEq by mouth 2 (two) times a day Two tabs, two times daily, Historical Med      prazosin (MINIPRESS) 2 mg capsule TAKE 1 CAPSULE BY MOUTH EVERY DAY, Normal      !! pregabalin (LYRICA) 150 mg capsule Take 1 capsule (150 mg total) by mouth 3 (three) times a day, Starting Wed 6/17/2020, Normal      !! pregabalin (LYRICA) 225 MG capsule Take 1 capsule (225 mg total) by mouth every 12 (twelve) hours, Starting Tue 3/9/2021, Normal      propranolol (INDERAL) 20 mg tablet Take 1 tablet (20 mg total) by mouth 2 (two) times a day, Starting Wed 3/17/2021, Until Tue 6/15/2021, Normal      QUEtiapine (SEROquel) 300 mg tablet Take 300 mg by mouth daily at bedtime, Historical Med      Respiratory Therapy Supplies (Nebulizer) YUDY Use as needed (Shortness of breath), Starting Mon 3/29/2021, Print      Sennosides (SENEXON PO) Take by mouth, Historical Med      SIMETHICONE PO Take by mouth as needed , Historical Med      sodium chloride (OCEAN) 0 65 % nasal spray 2 sprays into each nostril as needed, Historical Med      traZODone (DESYREL) 100 mg tablet Take 100 mg by mouth daily at bedtime 2 tablets daily at bedtime, Historical Med      triamcinolone (KENALOG) 0 025 % cream Apply topically 2 (two) times a day, Starting Thu 7/16/2020, Normal      Valbenazine Tosylate (Ingrezza) 80 MG CAPS Take 80 mg by mouth daily, Starting Wed 3/17/2021, Normal       !! - Potential duplicate medications found  Please discuss with provider  No discharge procedures on file  PDMP Review       Value Time User    PDMP Reviewed  Yes 9/5/2020 12:53 PM Stacia Arias MD           ED Provider  Attending physically available and evaluated Samantha QUINONEZ Ajbebeto Favorite  I managed the patient along with the ED Attending      Electronically Signed by         Todd Martin MD  04/28/21 8447

## 2021-04-28 NOTE — ED ATTENDING ATTESTATION
4/27/2021  IKaren DO, saw and evaluated the patient  I have discussed the patient with the resident/non-physician practitioner and agree with the resident's/non-physician practitioner's findings, Plan of Care, and MDM as documented in the resident's/non-physician practitioner's note, except where noted  All available labs and Radiology studies were reviewed  I was present for key portions of any procedure(s) performed by the resident/non-physician practitioner and I was immediately available to provide assistance  At this point I agree with the current assessment done in the Emergency Department  I have conducted an independent evaluation of this patient a history and physical is as follows:     57-year-old female presents for panic attack  Wants Ativan  Seems fine here  Has recently been seen by her doctor and had increase in her cervical dosing but did not start taking that yet    No SI or HI plan to discharge    ED Course         Critical Care Time  Procedures

## 2021-04-29 RX ORDER — PREGABALIN 225 MG/1
225 CAPSULE ORAL EVERY 12 HOURS
Qty: 60 CAPSULE | Refills: 0 | Status: SHIPPED | OUTPATIENT
Start: 2021-04-29 | End: 2021-07-07 | Stop reason: HOSPADM

## 2021-04-29 NOTE — TELEPHONE ENCOUNTER
Thanks, it looks like her ED visits are due to anxiety  She can set up a follow up visit with Dr Meenakshi Novak to address

## 2021-05-01 ENCOUNTER — HOSPITAL ENCOUNTER (EMERGENCY)
Facility: HOSPITAL | Age: 56
Discharge: HOME | End: 2021-05-01
Attending: EMERGENCY MEDICINE | Admitting: EMERGENCY MEDICINE
Payer: COMMERCIAL

## 2021-05-01 ENCOUNTER — APPOINTMENT (EMERGENCY)
Dept: RADIOLOGY | Facility: HOSPITAL | Age: 56
End: 2021-05-01
Payer: COMMERCIAL

## 2021-05-01 VITALS
RESPIRATION RATE: 18 BRPM | HEART RATE: 103 BPM | DIASTOLIC BLOOD PRESSURE: 78 MMHG | SYSTOLIC BLOOD PRESSURE: 127 MMHG | TEMPERATURE: 98.7 F | OXYGEN SATURATION: 99 %

## 2021-05-01 DIAGNOSIS — S92.309A METATARSAL FRACTURE: Primary | ICD-10-CM

## 2021-05-01 PROCEDURE — 73630 X-RAY EXAM OF FOOT: CPT

## 2021-05-01 PROCEDURE — 99283 EMERGENCY DEPT VISIT LOW MDM: CPT

## 2021-05-01 PROCEDURE — 99284 EMERGENCY DEPT VISIT MOD MDM: CPT | Performed by: EMERGENCY MEDICINE

## 2021-05-01 RX ORDER — IBUPROFEN 600 MG/1
600 TABLET ORAL ONCE
Status: COMPLETED | OUTPATIENT
Start: 2021-05-01 | End: 2021-05-01

## 2021-05-01 RX ADMIN — IBUPROFEN 600 MG: 600 TABLET, FILM COATED ORAL at 13:54

## 2021-05-02 ENCOUNTER — APPOINTMENT (EMERGENCY)
Dept: RADIOLOGY | Facility: HOSPITAL | Age: 56
DRG: 563 | End: 2021-05-02
Payer: COMMERCIAL

## 2021-05-02 ENCOUNTER — HOSPITAL ENCOUNTER (INPATIENT)
Facility: HOSPITAL | Age: 56
LOS: 15 days | Discharge: NURSING, REHAB OR LONG-TERM CARE | DRG: 563 | End: 2021-05-17
Attending: EMERGENCY MEDICINE | Admitting: INTERNAL MEDICINE
Payer: COMMERCIAL

## 2021-05-02 DIAGNOSIS — G89.29 BILATERAL CHRONIC KNEE PAIN: ICD-10-CM

## 2021-05-02 DIAGNOSIS — F43.10 POST TRAUMATIC STRESS DISORDER: ICD-10-CM

## 2021-05-02 DIAGNOSIS — G89.29 CHRONIC BACK PAIN: ICD-10-CM

## 2021-05-02 DIAGNOSIS — R29.6 FREQUENT FALLS: ICD-10-CM

## 2021-05-02 DIAGNOSIS — R26.2 AMBULATORY DYSFUNCTION: ICD-10-CM

## 2021-05-02 DIAGNOSIS — M25.561 BILATERAL CHRONIC KNEE PAIN: ICD-10-CM

## 2021-05-02 DIAGNOSIS — S92.309A METATARSAL FRACTURE: Primary | ICD-10-CM

## 2021-05-02 DIAGNOSIS — M54.9 CHRONIC BACK PAIN: ICD-10-CM

## 2021-05-02 DIAGNOSIS — F41.1 GENERALIZED ANXIETY DISORDER: ICD-10-CM

## 2021-05-02 DIAGNOSIS — F31.81 BIPOLAR II DISORDER (HCC): ICD-10-CM

## 2021-05-02 DIAGNOSIS — M25.562 BILATERAL CHRONIC KNEE PAIN: ICD-10-CM

## 2021-05-02 LAB
ANION GAP SERPL CALCULATED.3IONS-SCNC: 13 MMOL/L (ref 4–13)
BASOPHILS # BLD AUTO: 0.04 THOUSANDS/ΜL (ref 0–0.1)
BASOPHILS NFR BLD AUTO: 1 % (ref 0–1)
BUN SERPL-MCNC: 6 MG/DL (ref 5–25)
CALCIUM SERPL-MCNC: 9.3 MG/DL (ref 8.3–10.1)
CHLORIDE SERPL-SCNC: 106 MMOL/L (ref 100–108)
CO2 SERPL-SCNC: 23 MMOL/L (ref 21–32)
CREAT SERPL-MCNC: 1.07 MG/DL (ref 0.6–1.3)
EOSINOPHIL # BLD AUTO: 0 THOUSAND/ΜL (ref 0–0.61)
EOSINOPHIL NFR BLD AUTO: 0 % (ref 0–6)
ERYTHROCYTE [DISTWIDTH] IN BLOOD BY AUTOMATED COUNT: 13.3 % (ref 11.6–15.1)
GFR SERPL CREATININE-BSD FRML MDRD: 67 ML/MIN/1.73SQ M
GLUCOSE SERPL-MCNC: 110 MG/DL (ref 65–140)
HCT VFR BLD AUTO: 42.4 % (ref 34.8–46.1)
HGB BLD-MCNC: 14.6 G/DL (ref 11.5–15.4)
IMM GRANULOCYTES # BLD AUTO: 0.04 THOUSAND/UL (ref 0–0.2)
IMM GRANULOCYTES NFR BLD AUTO: 1 % (ref 0–2)
LYMPHOCYTES # BLD AUTO: 1.04 THOUSANDS/ΜL (ref 0.6–4.47)
LYMPHOCYTES NFR BLD AUTO: 12 % (ref 14–44)
MCH RBC QN AUTO: 28.8 PG (ref 26.8–34.3)
MCHC RBC AUTO-ENTMCNC: 34.4 G/DL (ref 31.4–37.4)
MCV RBC AUTO: 84 FL (ref 82–98)
MONOCYTES # BLD AUTO: 0.7 THOUSAND/ΜL (ref 0.17–1.22)
MONOCYTES NFR BLD AUTO: 8 % (ref 4–12)
NEUTROPHILS # BLD AUTO: 6.69 THOUSANDS/ΜL (ref 1.85–7.62)
NEUTS SEG NFR BLD AUTO: 78 % (ref 43–75)
NRBC BLD AUTO-RTO: 0 /100 WBCS
PLATELET # BLD AUTO: 318 THOUSANDS/UL (ref 149–390)
PMV BLD AUTO: 9.6 FL (ref 8.9–12.7)
POTASSIUM SERPL-SCNC: 2.9 MMOL/L (ref 3.5–5.3)
RBC # BLD AUTO: 5.07 MILLION/UL (ref 3.81–5.12)
SODIUM SERPL-SCNC: 142 MMOL/L (ref 136–145)
WBC # BLD AUTO: 8.51 THOUSAND/UL (ref 4.31–10.16)

## 2021-05-02 PROCEDURE — 99285 EMERGENCY DEPT VISIT HI MDM: CPT | Performed by: EMERGENCY MEDICINE

## 2021-05-02 PROCEDURE — 80048 BASIC METABOLIC PNL TOTAL CA: CPT | Performed by: INTERNAL MEDICINE

## 2021-05-02 PROCEDURE — 29515 APPLICATION SHORT LEG SPLINT: CPT | Performed by: EMERGENCY MEDICINE

## 2021-05-02 PROCEDURE — 96372 THER/PROPH/DIAG INJ SC/IM: CPT

## 2021-05-02 PROCEDURE — 73630 X-RAY EXAM OF FOOT: CPT

## 2021-05-02 PROCEDURE — 99285 EMERGENCY DEPT VISIT HI MDM: CPT

## 2021-05-02 PROCEDURE — 2W3SX1Z IMMOBILIZATION OF RIGHT FOOT USING SPLINT: ICD-10-PCS | Performed by: EMERGENCY MEDICINE

## 2021-05-02 PROCEDURE — 85025 COMPLETE CBC W/AUTO DIFF WBC: CPT | Performed by: INTERNAL MEDICINE

## 2021-05-02 RX ORDER — QUETIAPINE FUMARATE 100 MG/1
300 TABLET, FILM COATED ORAL
Status: DISCONTINUED | OUTPATIENT
Start: 2021-05-02 | End: 2021-05-16

## 2021-05-02 RX ORDER — AMOXICILLIN 250 MG
1 CAPSULE ORAL
Status: DISCONTINUED | OUTPATIENT
Start: 2021-05-02 | End: 2021-05-07

## 2021-05-02 RX ORDER — PANTOPRAZOLE SODIUM 40 MG/1
40 TABLET, DELAYED RELEASE ORAL
Status: DISCONTINUED | OUTPATIENT
Start: 2021-05-03 | End: 2021-05-17 | Stop reason: HOSPADM

## 2021-05-02 RX ORDER — DULOXETIN HYDROCHLORIDE 60 MG/1
60 CAPSULE, DELAYED RELEASE ORAL DAILY
Status: DISCONTINUED | OUTPATIENT
Start: 2021-05-03 | End: 2021-05-17 | Stop reason: HOSPADM

## 2021-05-02 RX ORDER — PREGABALIN 75 MG/1
225 CAPSULE ORAL EVERY 12 HOURS SCHEDULED
Status: DISCONTINUED | OUTPATIENT
Start: 2021-05-02 | End: 2021-05-05 | Stop reason: SDUPTHER

## 2021-05-02 RX ORDER — PROPRANOLOL HYDROCHLORIDE 20 MG/1
20 TABLET ORAL 2 TIMES DAILY
Status: DISCONTINUED | OUTPATIENT
Start: 2021-05-02 | End: 2021-05-17 | Stop reason: HOSPADM

## 2021-05-02 RX ORDER — POLYETHYLENE GLYCOL 3350 17 G/17G
17 POWDER, FOR SOLUTION ORAL DAILY
Status: DISCONTINUED | OUTPATIENT
Start: 2021-05-03 | End: 2021-05-17 | Stop reason: HOSPADM

## 2021-05-02 RX ORDER — OXYCODONE HYDROCHLORIDE 10 MG/1
10 TABLET ORAL ONCE
Status: COMPLETED | OUTPATIENT
Start: 2021-05-02 | End: 2021-05-02

## 2021-05-02 RX ORDER — OXYBUTYNIN CHLORIDE 5 MG/1
5 TABLET ORAL 2 TIMES DAILY
Status: DISCONTINUED | OUTPATIENT
Start: 2021-05-02 | End: 2021-05-17 | Stop reason: HOSPADM

## 2021-05-02 RX ORDER — NORTRIPTYLINE HYDROCHLORIDE 10 MG/1
10 CAPSULE ORAL
Status: DISCONTINUED | OUTPATIENT
Start: 2021-05-02 | End: 2021-05-17 | Stop reason: HOSPADM

## 2021-05-02 RX ORDER — HYDROXYZINE HYDROCHLORIDE 25 MG/1
25 TABLET, FILM COATED ORAL EVERY 6 HOURS PRN
Status: DISCONTINUED | OUTPATIENT
Start: 2021-05-02 | End: 2021-05-16

## 2021-05-02 RX ORDER — KETOROLAC TROMETHAMINE 30 MG/ML
30 INJECTION, SOLUTION INTRAMUSCULAR; INTRAVENOUS ONCE
Status: COMPLETED | OUTPATIENT
Start: 2021-05-02 | End: 2021-05-02

## 2021-05-02 RX ORDER — MORPHINE SULFATE 15 MG/1
15 TABLET, FILM COATED, EXTENDED RELEASE ORAL 2 TIMES DAILY
Status: DISCONTINUED | OUTPATIENT
Start: 2021-05-02 | End: 2021-05-03

## 2021-05-02 RX ORDER — HEPARIN SODIUM 5000 [USP'U]/ML
7500 INJECTION, SOLUTION INTRAVENOUS; SUBCUTANEOUS EVERY 8 HOURS SCHEDULED
Status: DISCONTINUED | OUTPATIENT
Start: 2021-05-02 | End: 2021-05-17 | Stop reason: HOSPADM

## 2021-05-02 RX ORDER — OLANZAPINE 5 MG/1
5 TABLET, ORALLY DISINTEGRATING ORAL ONCE
Status: COMPLETED | OUTPATIENT
Start: 2021-05-02 | End: 2021-05-02

## 2021-05-02 RX ORDER — AMLODIPINE BESYLATE 5 MG/1
5 TABLET ORAL DAILY
Status: DISCONTINUED | OUTPATIENT
Start: 2021-05-03 | End: 2021-05-17 | Stop reason: HOSPADM

## 2021-05-02 RX ORDER — MELATONIN
1000 DAILY
Status: DISCONTINUED | OUTPATIENT
Start: 2021-05-03 | End: 2021-05-17 | Stop reason: HOSPADM

## 2021-05-02 RX ORDER — KETOROLAC TROMETHAMINE 30 MG/ML
15 INJECTION, SOLUTION INTRAMUSCULAR; INTRAVENOUS EVERY 6 HOURS PRN
Status: DISCONTINUED | OUTPATIENT
Start: 2021-05-02 | End: 2021-05-03

## 2021-05-02 RX ORDER — PRAZOSIN HYDROCHLORIDE 2 MG/1
2 CAPSULE ORAL DAILY
Status: DISCONTINUED | OUTPATIENT
Start: 2021-05-03 | End: 2021-05-17 | Stop reason: HOSPADM

## 2021-05-02 RX ORDER — ACETAMINOPHEN 325 MG/1
975 TABLET ORAL EVERY 6 HOURS SCHEDULED
Status: DISCONTINUED | OUTPATIENT
Start: 2021-05-02 | End: 2021-05-06

## 2021-05-02 RX ADMIN — HYDROXYZINE HYDROCHLORIDE 25 MG: 25 TABLET, FILM COATED ORAL at 19:35

## 2021-05-02 RX ADMIN — MORPHINE SULFATE 15 MG: 15 TABLET, FILM COATED, EXTENDED RELEASE ORAL at 19:03

## 2021-05-02 RX ADMIN — BUSPIRONE HYDROCHLORIDE 15 MG: 10 TABLET ORAL at 22:00

## 2021-05-02 RX ADMIN — PROPRANOLOL HYDROCHLORIDE 20 MG: 20 TABLET ORAL at 22:00

## 2021-05-02 RX ADMIN — OXYCODONE HYDROCHLORIDE 10 MG: 10 TABLET ORAL at 15:19

## 2021-05-02 RX ADMIN — KETOROLAC TROMETHAMINE 15 MG: 30 INJECTION, SOLUTION INTRAMUSCULAR at 19:53

## 2021-05-02 RX ADMIN — OLANZAPINE 5 MG: 5 TABLET, ORALLY DISINTEGRATING ORAL at 15:45

## 2021-05-02 RX ADMIN — KETOROLAC TROMETHAMINE 30 MG: 30 INJECTION, SOLUTION INTRAMUSCULAR at 15:20

## 2021-05-02 RX ADMIN — OXYBUTYNIN CHLORIDE 5 MG: 5 TABLET ORAL at 19:03

## 2021-05-02 RX ADMIN — QUETIAPINE FUMARATE 300 MG: 100 TABLET ORAL at 22:11

## 2021-05-02 RX ADMIN — ACETAMINOPHEN 975 MG: 325 TABLET ORAL at 19:03

## 2021-05-02 RX ADMIN — HEPARIN SODIUM 7500 UNITS: 5000 INJECTION INTRAVENOUS; SUBCUTANEOUS at 19:04

## 2021-05-02 RX ADMIN — NORTRIPTYLINE HYDROCHLORIDE 10 MG: 10 CAPSULE ORAL at 22:13

## 2021-05-02 RX ADMIN — HEPARIN SODIUM 7500 UNITS: 5000 INJECTION INTRAVENOUS; SUBCUTANEOUS at 22:14

## 2021-05-02 RX ADMIN — DOCUSATE SODIUM AND SENNOSIDES 1 TABLET: 8.6; 5 TABLET ORAL at 22:11

## 2021-05-02 RX ADMIN — PREGABALIN 225 MG: 75 CAPSULE ORAL at 22:00

## 2021-05-02 NOTE — H&P
INTERNAL MEDICINE RESIDENCY ADMISSION H&P     Name: Mata Salazar   Age & Sex: 60 y o  female   MRN: [id number]  Unit/Bed#: CW2 218-02   Encounter: [id number]  Primary Care Provider: Mattie Woodward MD    Code Status: Level 1 - Full Code  Admission Status: INPATIENT   Disposition: Patient requires Med/Surg    Admit to team: SOD Team B     ASSESSMENT/PLAN     Principal Problem:    Ambulatory dysfunction  Active Problems:    Metatarsal fracture    Fibromyalgia    Bipolar II disorder ([facility])    Generalized anxiety disorder    Hypertension    Insomnia    Migraine    Opioid dependence ([facility] [address] )    Post traumatic stress disorder    Chronic back pain      Metatarsal fracture  Assessment & Plan  -patient's spirits mechanical fall 2 days ago, [place] showed 2nd, 3rd, 4th non displaced fracture of metatarsals, patient has been bearing weight on the right foot in ambulating around her house, this morning she reports the pain was too severe and she came into the ED, patient is requesting  Placement into a nursing facility     Xray obtained in ER today reviewed: Unchanged nondisplaced fractures at the bases of the 2nd and 4th metatarsals  Small intra-articular fracture at the base of the 3rd metatarsal   No significant widening between the 1st and 2nd metatarsals to suggest Lisfranc injury  Generalized anxiety disorder  Assessment & Plan  -continue home meds    Bipolar II disorder ([facility] [address] )  Assessment & Plan  -continue home meds    * Ambulatory dysfunction  Assessment & Plan  -post metatarsal fracture  -podiatry consult  -pt/ot consult for rehab  -case management consult      VTE Pharmacologic Prophylaxis: Heparin  VTE Mechanical Prophylaxis: sequential compression device    CHIEF COMPLAINT     Chief Complaint   Patient presents with    Personal Problem     pt called EMS to bring her here to [facility] so we could get her into [facility] because she has a broken foot and her son is not home to care for her   she has pain medications at home  pt was able to walk to ambulance with walker without problem   Difficulty Walking      HISTORY OF PRESENT ILLNESS     53-year-old history with past medical history of multiple psych disorders including bipolar 2 disorder, posttraumatic stress disorder, panic attacks, fibromyalgia, hypertension, chronic back pain, urinary incontinence/overactive bladder, migraines, presents with a move dysfunction status post metatarsal fracture  Patient experienced a mechanical fall about 2 days ago when ambulating at home with a walker, she denied any prodromal symptoms, did not hit her head or lose consciousness  She had x-ray imaging  At [facility] which showed 2nd, 3rd, 4th metatarsal fractures, nondisplaced  She was placed in a surgical boot with planned follow-up with Podiatry on May 3rd  However the patient could not tolerate the pain and called EMS to bring her to the hospital   Of note the patient was able to ambulate on her own to the EMS stretcher  Patient is requesting to be admitted to a nursing home  She currently lives at home with her son, who works 2 chest tightness and is unable to care for her    During my exam in the ED the patient denied have any focal deficits, alert and oriented x3  Patient was complaining of pain and seemed to be very anxious  She did not know what medication she had taken today  Patient admitted to ambulating with her right foot even though she was informed to not bear weight  Explained that to the pain with likely flaring due to her ambulating and bearing weight on that right foot  REVIEW OF SYSTEMS     Review of Systems   Constitutional: Negative for chills, fatigue and fever  Eyes: Negative for visual disturbance  Respiratory: Negative for cough, chest tightness, shortness of breath and wheezing  Cardiovascular: Negative for chest pain, palpitations and leg swelling  Endocrine: Negative for polydipsia, polyphagia and polyuria  Genitourinary: Negative for dysuria, flank pain and hematuria  Musculoskeletal: Positive for arthralgias, back pain and gait problem  Neurological: Negative for dizziness, tremors, syncope, weakness, light-headedness, numbness and headaches  Psychiatric/Behavioral: Positive for sleep disturbance  The patient is nervous/anxious  OBJECTIVE     Vitals:    21 1341 21 1823 21 1851   BP:  148/94 150/98   BP Location:  Right arm    Pulse: 85 (!) 131 (!) 136   Resp:  22 (!) 30   Temp: (!) 97 3 °F (36 3 °C) 98 5 °F (36 9 °C) 100 1 °F (37 8 °C)   TempSrc: Axillary Oral    SpO2: 94% 99% 98%      Temperature:   Temp (24hrs), Av 6 °F (37 °C), Min:97 3 °F (36 3 °C), Max:100 1 °F (37 8 °C)    Temperature: 100 1 °F (37 8 °C)  Intake & Output:  I/O     None        Weights: There is no height or weight on file to calculate BMI  Weight (last 2 days)     None        Physical Exam  Vitals signs and nursing note reviewed  Constitutional:       General: She is in acute distress  Comments: In pain   HENT:      Head: Normocephalic and atraumatic  Mouth/Throat:      Pharynx: No oropharyngeal exudate or posterior oropharyngeal erythema  Eyes:      General: No scleral icterus  Cardiovascular:      Rate and Rhythm: Regular rhythm  Tachycardia present  Heart sounds: No murmur  No friction rub  Pulmonary:      Effort: No respiratory distress  Breath sounds: No stridor  No rhonchi or rales  Chest:      Chest wall: No tenderness  Abdominal:      General: Abdomen is flat  There is no distension  Palpations: Abdomen is soft  There is no mass  Tenderness: There is no abdominal tenderness  There is no right CVA tenderness, left CVA tenderness, guarding or rebound  Hernia: No hernia is present  Musculoskeletal:         General: Tenderness and signs of injury present  Comments: Right leg wrapped/immobilized     Skin:     Coloration: Skin is not jaundiced or pale       Findings: No bruising, erythema, lesion or rash  PAST MEDICAL HISTORY     Past Medical History:   Diagnosis Date    Acid reflux     Anxiety     RESOLVED: [id number]    Arthritis     Bipolar 2 disorder (HCC)     FOLLOWS WITH PSYCHIATRIST  CONTINUE LAMOTRIGINE; RESOLVED: [id number]    Depression     Familial tremor     both hands    Fibromyalgia     LAST ASSESSED: [id number]    Hearing aid worn     left ear    [place] (hard of hearing)     left ear    Hypertension     Left-sided weakness     Lower back pain     Memory loss of unknown cause     long and short term    Migraine     Obesity     Overactive bladder     Panic attack     Post traumatic stress disorder     Seasonal allergies     Stroke [facility])     questionable stroke 2009    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    CONT ASA 81 MG DAILY; RESOLVED: [id number]    Urinary incontinence     Wears dentures     partial lower / full upper    Wears glasses      PAST SURGICAL HISTORY     Past Surgical History:   Procedure Laterality Date    BACK SURGERY       SECTION      COLONOSCOPY      RESOLVED: [id number]    EAR SURGERY      EGD      HYSTERECTOMY  2004    MYRINGOTOMY W/ TUBES Left     NECK SURGERY  2019    GA CYSTOURETHROSCOPY N/A 2016    Procedure: CYSTOSCOPY, BOTOX INJECTION;  Surgeon: Benedict Unger MD;  Location: [facility];  Service: Gynecology    GA IMPLANT SPINAL NEUROSTIM/ Right 2/10/2021    Procedure: REPLACEMENT IMPLANTABLE PULSE GENERATOR DORSAL SPINAL COLUMN STIMULATOR, RIGHT;  Surgeon: Geraldine Ames MD;  Location: [facility];  Service: Neurosurgery    GA PERCUT IMPLNT [place] Right 2020    Procedure: INSERTION THORACIC DORSAL COLUMN SPINAL CORD STIMULATOR PERCUTANEOUS W IMPLANTABLE PULSE GENERATOR, RIGHT;  Surgeon: Geraldine Ames MD;  Location:  MAIN OR;  Service: Neurosurgery   [address] [place] [address] GASTROINTESTINAL ENDOSCOPY  09/2020     SOCIAL & FAMILY HISTORY     Social History     Substance and Sexual Activity   Alcohol Use Not Currently    Comment: 2 x year; being a social drinker as per all scripts      Substance and Sexual Activity   Alcohol Use Not Currently    Comment: 2 x year; being a social drinker as per all scripts         Substance and Sexual Activity   Drug Use No     Social History     Tobacco Use   Smoking Status Never Smoker   Smokeless Tobacco Never Used     Family History   Problem Relation Age of Onset    Colon cancer Mother     Alzheimer's disease Father     Stroke Father     Colon cancer Brother     Bipolar disorder Brother     Breast cancer Maternal Aunt     Colon cancer Maternal Aunt     Heart disease Other     Diabetes Other     Hypertension Other     Seizures Son     Depression Paternal Grandfather     No Known Problems Sister     No Known Problems Brother     Thyroid disease Neg Hx      LABORATORY DATA     Labs: I have personally reviewed pertinent reports  Invalid input(s):  EOSPCT       Invalid input(s): LABALBU                       Micro:  Lab Results   Component Value Date    BLOODCX No Growth After 5 Days  03/09/2020    BLOODCX No Growth After 5 Days  03/09/2020     IMAGING & DIAGNOSTIC TESTS     Imaging: I have personally reviewed pertinent reports  Xr Foot 3+ Views Right    Result Date: 5/2/2021  Impression: Unchanged fractures of the 2nd and 4th metatarsals  Small intra-articular fracture base of 3rd metatarsal  Workstation performed: [id number]     EKG, Pathology, and Other Studies: I have personally reviewed pertinent reports  ALLERGIES   No Known Allergies  MEDICATIONS PRIOR TO ARRIVAL     Prior to Admission medications    Medication Sig Start Date End Date Taking?  Authorizing Provider   acetaminophen (TYLENOL) 325 mg tablet Take 2 tablets (650 mg total) by mouth every 8 (eight) hours as needed for mild pain 3/9/21   Cody Franco, DO amLODIPine (NORVASC) 5 mg tablet Take 1 tablet (5 mg total) by mouth daily 3/25/20 4/20/21  Nguyễn Dockery MD   bisacodyl (DULCOLAX) 5 mg EC tablet Take 5 mg by mouth daily as needed for constipation    Historical Provider, MD   busPIRone (BUSPAR) 15 mg tablet Take 1 tablet (15 mg total) by mouth 3 (three) times a day 3/17/21 4/20/21  Nguyễn Dockery MD   cetirizine (ZyrTEC) 10 mg tablet Take 10 mg by mouth as needed  11/19/20   Historical Provider, MD   cholecalciferol (VITAMIN D3) 1,000 units tablet Take 1 tablet (1,000 Units total) by mouth daily 8/4/20 2/24/21  Nguyễn Dockery MD   [facility] Vitamin C 500 MG tablet TAKE 1 TABLET BY MOUTH TWICE A DAY 4/14/21   Nguyễn Dockery MD   Diapers & Supplies (Huggies Pull-Ups) MISC Use 3 (three) times a day 4/20/21   Eloisa Angelucci, DO   diclofenac sodium (VOLTAREN) 1 % APPLY 4 G TOPICALLY 4 (FOUR) TIMES A DAY  Patient taking differently: Apply 4 g topically 2 (two) times a day  8/5/20   Nathalie Graf DO   DULoxetine (CYMBALTA) 60 mg delayed release capsule TAKE 1 CAPSULE BY MOUTH EVERY DAY 4/14/21   Nguyễn Dockery MD   ferrous sulfate 324 (65 Fe) mg TAKE 1 TABLET (324 MG TOTAL) BY MOUTH 2 (TWO) TIMES A DAY BEFORE MEALS 4/13/21   Nguyễn Dockery MD   folic acid (FOLVITE) 1 mg tablet TAKE 1 TABLET BY MOUTH EVERY DAY 4/14/21   Nguyễn Dockery MD   hydrOXYzine HCL (ATARAX) 50 mg tablet Take 1 tablet (50 mg total) by mouth daily at bedtime as needed for anxiety (insomnia) 3/17/21   Nguyễn Dockery MD   ipratropium-albuterol (DUO-NEB) 0 5-2 5 mg/3 mL nebulizer solution Take 1 vial (3 mL total) by nebulization every 6 (six) hours as needed for wheezing or shortness of breath  Patient not taking: Reported on 4/20/2021 3/29/21   Neyda Holman DO   lidocaine (LMX) 4 % cream Apply topically as needed for mild pain  Patient not taking: Reported on 2/24/2021 7/16/20   Nathalie Graf DO   LORazepam (ATIVAN) 0 5 mg tablet Take 1 tablet (0 5 mg total) by mouth every 8 (eight) hours as needed for anxiety for up to 10 days Hold if sedated  Patient not taking: Reported on 11/27/2020 9/9/20 9/19/20  Aicha Payne DO   morphine (MS CONTIN) 15 mg 12 hr tablet Take 1 tablet (15 mg total) by mouth 2 (two) times a day Hold if sedatedMax Daily Amount: 30 mg 4/20/21   Maria Luz Calhoun DO   Mouthwashes (Biotene Dry Mouth) LIQD Apply 5 mL to the mouth or throat daily    Historical Provider, MD   Multiple Vitamins-Minerals (multivitamin with minerals) tablet Take 1 tablet by mouth daily 8/18/20   Khushi Jamil MD   naloxone [facility]) 4 mg/0 1 mL nasal spray Administer 1 spray into a nostril  If no response after 2-3 minutes, give another dose in the other nostril using a new spray   3/29/21   Maria Luz Calhoun DO   nortriptyline (PAMELOR) 10 mg capsule Take 1 capsule (10 mg total) by mouth daily at bedtime 4/16/21   Lyly Aldana DO   omeprazole (PriLOSEC) 20 mg delayed release capsule Take 1 capsule (20 mg total) by mouth daily 3/17/21 6/15/21  Matt Cesar MD   ondansetron (ZOFRAN-ODT) 4 mg disintegrating tablet Take 1 tablet (4 mg total) by mouth every 6 (six) hours as needed for nausea or vomiting  Patient not taking: Reported on 2/24/2021 9/17/20   Lyly Aldana DO   oxybutynin (DITROPAN) 5 mg tablet Take 1 tablet (5 mg total) by mouth 2 (two) times a day 3/17/21 6/15/21  Matt Cesar MD   oxyCODONE (ROXICODONE) 5 mg immediate release tablet Take 1 tablet (5 mg total) by mouth dailyMax Daily Amount: 5 mg 4/20/21   Maria Luz Calhoun DO   Polyethylene Glycol 3350 (MIRALAX PO) Take by mouth    Historical Provider, MD   potassium chloride (K-DUR,KLOR-CON) 10 mEq tablet Take 2 tablets (20 mEq total) by mouth 2 (two) times a day for 2 days 4/13/21 4/15/21  Jana Byers MD   potassium chloride (MICRO-K) 10 MEQ CR capsule Take 10 mEq by mouth 2 (two) times a day Two tabs, two times daily    Historical Provider, MD   prazosin (MINIPRESS) 2 mg capsule TAKE 1 CAPSULE BY MOUTH EVERY DAY 7/9/20 Meeta Beck MD   pregabalin (LYRICA) 150 mg capsule Take 1 capsule (150 mg total) by mouth 3 (three) times a day  Patient not taking: Reported on 4/20/2021 6/17/20   Td Ureña DO   pregabalin (LYRICA) 225 MG capsule TAKE 1 CAPSULE (225 MG TOTAL) BY MOUTH EVERY 12 (TWELVE) HOURS 4/29/21   Vani Song DO   propranolol (INDERAL) 20 mg tablet Take 1 tablet (20 mg total) by mouth 2 (two) times a day 3/17/21 6/15/21  Nguyễn Dockery MD   QUEtiapine (SEROquel) 300 mg tablet Take 300 mg by mouth daily at bedtime    Historical Provider, MD   Respiratory Therapy Supplies (Nebulizer) YUDY Use as needed (Shortness of breath)  Patient not taking: Reported on 4/20/2021 3/29/21   Neyda Holman DO   Sennosides (SENEXON PO) Take by mouth    Historical Provider, MD   SIMETHICONE PO Take by mouth as needed     Historical Provider, MD   sodium chloride (OCEAN) 0 65 % nasal spray 2 sprays into each nostril as needed    Historical Provider, MD   traZODone (DESYREL) 100 mg tablet Take 100 mg by mouth daily at bedtime 2 tablets daily at bedtime    Historical Provider, MD   triamcinolone (KENALOG) 0 025 % cream Apply topically 2 (two) times a day  Patient not taking: Reported on 4/20/2021 7/16/20   Nathalie Graf DO   Valbenazine Tosylate (Ingrezza) 80 MG CAPS Take 80 mg by mouth daily 3/17/21   Nguyễn Dockery MD     MEDICATIONS ADMINISTERED IN LAST 24 HOURS     Medication Administration - last 24 hours from 05/01/2021 1913 to 05/02/2021 1913       Date/Time Order Dose Route Action Action by     05/02/2021 1519 oxyCODONE (ROXICODONE) immediate release tablet 10 mg 10 mg Oral Given Jonathan Krik RN     05/02/2021 1520 ketorolac (TORADOL) injection 30 mg 30 mg Intramuscular Given Jonathan Kirk RN     05/02/2021 1545 OLANZapine (ZyPREXA ZYDIS) dispersible tablet 5 mg 5 mg Oral Given Jonathan Kirk RN     05/02/2021 1904 heparin (porcine) subcutaneous injection 7,500 Units 7,500 Units Subcutaneous Given Zarina dorsey [address] [facility]     05/02/2021 1903 acetaminophen (TYLENOL) tablet 975 mg 975 mg Oral Given Zarina de [address] [facility]     05/02/2021 1903 morphine (MS CONTIN) ER tablet 15 mg 15 mg Oral Given Zarina de [address] [facility]     05/02/2021 1903 oxybutynin (DITROPAN) tablet 5 mg 5 mg Oral Given Zarina de [address] [facility]        CURRENT MEDICATIONS     Current Facility-Administered Medications   Medication Dose Route Frequency Provider Last Rate    acetaminophen  975 mg Oral Q6H [address] Louis Banai, DO      [START ON 5/3/2021] amLODIPine  5 mg Oral Daily Louis Banai, DO      busPIRone  15 mg Oral TID Louis Banai, DO      [START ON 5/3/2021] cholecalciferol  1,000 Units Oral Daily Louis Banai, DO      [START ON 5/3/2021] DULoxetine  60 mg Oral Daily Louis Banai, DO      heparin (porcine)  7,500 Units Subcutaneous Q8H [address] Louis Banai, DO      ketorolac  15 mg Intravenous Q6H PRN Louis Banai, DO      morphine  15 mg Oral BID Louis Banai, DO      nortriptyline  10 mg Oral HS Louis Banai, DO      oxybutynin  5 mg Oral BID Louis Banai, DO      [START ON 5/3/2021] pantoprazole  40 mg Oral Early Morning Louis Banai, DO      [START ON 5/3/2021] polyethylene glycol  17 g Oral Daily Louis Banai, DO      [START ON 5/3/2021] prazosin  2 mg Oral Daily Louis Banai, DO      pregabalin  225 mg Oral Q12H [address] Louis Banai, DO      propranolol  20 mg Oral BID Louis Banai, DO      QUEtiapine  300 mg Oral HS Louis Banai, DO      senna-docusate sodium  1 tablet Oral HS Louis Banai, DO      [START ON 5/3/2021] Valbenazine Tosylate  80 mg Oral Daily Louis Banai, DO          ketorolac, 15 mg, Q6H PRN        Admission Time  I spent 20 minutes admitting the patient  This involved direct patient contact where I performed a full history and physical, reviewing previous records, and reviewing laboratory and other diagnostic studies  Portions of the record may have been created with voice recognition software    Occasional wrong word or "sound a like" substitutions may have occurred due to the inherent limitations of voice recognition software    Read the chart carefully and recognize, using context, where substitutions have occurred     ==  Vero Espinal, [facility]  Internal Medicine Residency PGY-3

## 2021-05-02 NOTE — ED NOTES
Pt asking to speak with son   Pt made aware son is coming back in person as we speak     Geneva Alejandro  05/02/21 [id number]

## 2021-05-02 NOTE — ED NOTES
Pt given bag and a new mask per patient request  Pt talking on cellphone     Tevin Heading  05/02/21 1168

## 2021-05-02 NOTE — ED NOTES
Pt stating her hand is cramping and she is convinced she is having a stroke, provider made aware and at pt bedside     Elaine Villavicencio  05/02/21 [id number]

## 2021-05-02 NOTE — ASSESSMENT & PLAN NOTE
Mechanical fall 2 days ago, imaging at [facility] ER showed 2nd, 3rd, 4th non displaced fracture of metatarsals  Patient has been bearing weight on the right foot in ambulating around her house, this morning she reports the pain was too severe and she came into the ED, patient is requesting  Placement into a nursing facility     Xray obtained in ER reviewed: Unchanged nondisplaced fractures at the bases of the 2nd and 4th metatarsals  Small intra-articular fracture at the base of the 3rd metatarsal   No significant widening between the 1st and 2nd metatarsals to suggest Lisfranc injury      - podiatry consulted - no surgical intervention indicated  - NWB to [facility]  - pain management with home Lyrica, morphine, Toradol 10mg PO for moderate pain (5 days, now  with no doses given), oxycodone 5mg daily PRN for severe pain  - rehab placement pending

## 2021-05-02 NOTE — ASSESSMENT & PLAN NOTE
Psych consult requested by county for placement; consult placed 5/11  - psychiatry team has cleared patient for discharge to rehab

## 2021-05-02 NOTE — ED ATTENDING ATTESTATION
Final Diagnosis:  1  Metatarsal fracture    2  Ambulatory dysfunction    3  Chronic back pain    4  Bilateral chronic knee pain    5  Frequent falls           IDee Dee Sa, MD, saw and evaluated the patient  All available labs and X-rays were ordered by me or the resident and have been reviewed by myself  I discussed the patient with the resident / non-physician and agree with the resident's / non-physician practitioner's findings and plan as documented in the resident's / non-physician practicitioner's note, except where noted  At this point, I agree with the current assessment done in the ED  I was present during key portions of all procedures performed unless otherwise stated  Chief Complaint   Patient presents with    Personal Problem     pt called EMS to bring her here to [facility] so we could get her into [facility] because she has a broken foot and her son is not home to care for her  she has pain medications at home  pt was able to walk to ambulance with walker without problem   Difficulty Walking     This is a 60 y o  female presenting for evaluation of foot fracture pain  The patient had a fall a couple days ago (unclear mechanism)  She injured her foot  She went to [facility] last night where she had an XR demonstrating what looks like the base of 2nd / 3rd / 4th MTPs are broken (image #2): She was placed in a surgical boot and discharged home with podiatry followup planned on Monday  The patient has been walking on it and had severe foot pain  No further falls/trauma  Patient called EMS due to the pain  She ambulated with walker to EMS stretcher  She used oxycodone for pain at home; had access to morphine as well but didn't use it  She is here for admission for her foot pain b/c she doesn't want to be at home  No NSAIDs used      PMH:   has a past medical history of Acid reflux, Anxiety, Arthritis, Bipolar 2 disorder ([address] ), Depression, Familial tremor, Fibromyalgia, Hearing aid worn, [place] (hard of hearing), Hypertension, Left-sided weakness, Lower back pain, Memory loss of unknown cause, Migraine, Obesity, Overactive bladder, Panic attack, Post traumatic stress disorder, Seasonal allergies, Stroke (HCC), Thrombosis of cerebral arteries, Urinary incontinence, Wears dentures, and Wears glasses  PSH:   has a past surgical history that includes Hysterectomy (); Tubal ligation (); Myringotomy w/ tubes (Left); Tonsillectomy; Colonoscopy; pr cystourethroscopy (N/A, 2016);  section (); EAR SURGERY; Back surgery; Neck surgery (2019); pr percut implnt neuroelect,epidural (Right, 2020); EGD; pr implant spinal neurostim/ (Right, 2/10/2021); and Upper gastrointestinal endoscopy (2020)  Social:  Social History     Substance and Sexual Activity   Alcohol Use Not Currently    Comment: 2 x year; being a social drinker as per all scripts      Social History     Tobacco Use   Smoking Status Never Smoker   Smokeless Tobacco Never Used     Social History     Substance and Sexual Activity   Drug Use No     PE:  Vitals:    21 1221 21 1533 21 1744 21 0745   BP: 103/72 106/71 117/80 108/70   BP Location:   Right arm    Pulse:  68  67   Resp:  18  16   Temp:  98 3 °F (36 8 °C)  97 6 °F (36 4 °C)   TempSrc:       SpO2:  96%  95%   General: VS reviewed  Appears in NAD  awake, alert  Well-nourished, well-developed  Appears stated age  Speaking normally in full sentences  Head: Normocephalic, atraumatic  Eyes: EOM-I  No diplopia  No hyphema  No subconjunctival hemorrhages  Symmetrical lids  ENT: Atraumatic external nose and ears  MMM  No malocclusion  No stridor  Normal phonation  No drooling  Normal swallowing  Neck: No JVD    CV: No pallor noted  Lungs:   No tachypnea  No respiratory distress  MSK:   FROM spontaneously  EHL FHL intact but elicits severe pain  Sensation itnact throughout  This is not a compartment syndrome  2+ DPs  Normal skin temeprature  PF DF KF KE 5/5  Good color change distally  Skin: Dry, intact  Neuro: Awake, alert, GCS15, CN II-XII grossly intact  Motor grossly intact  Psychiatric/Behavioral: Appropriate mood and affect   Exam: deferred  A:  - Foot pain  P:  - Will immobilize with a double splint  She is likely having this degree of pain b/c she is not being NWB on the extremity     - If continued pain, will discuss CT but already had good quality XRs but given degree of pain, will get weight bearing films for [facility]      - 13 point ROS was performed and all are normal unless stated in the history above  - Nursing note reviewed  Vitals reviewed  - Orders placed by myself and/or advanced practitioner / resident     - Previous chart was reviewed  - No language barrier    - History obtained from patient  - There are no limitations to the history obtained  - Critical care time: Not applicable for this patient       Code Status: Level 1 - Full Code  Advance Directive and Living Will:      Power of :    POLST:      Medications   heparin (porcine) subcutaneous injection 7,500 Units (7,500 Units Subcutaneous Given 5/4/21 0657)   acetaminophen (TYLENOL) tablet 975 mg (975 mg Oral Given 5/4/21 0655)   amLODIPine (NORVASC) tablet 5 mg (5 mg Oral Given 5/4/21 0949)   busPIRone (BUSPAR) tablet 15 mg (15 mg Oral Given 5/4/21 0948)   cholecalciferol (VITAMIN D3) tablet 1,000 Units (1,000 Units Oral Given 5/4/21 0949)   nortriptyline (PAMELOR) capsule 10 mg (10 mg Oral Given 5/3/21 2239)   pantoprazole (PROTONIX) EC tablet 40 mg (40 mg Oral Given 5/4/21 0656)   oxybutynin (DITROPAN) tablet 5 mg (5 mg Oral Given 5/4/21 0949)   pregabalin (LYRICA) capsule 225 mg (225 mg Oral Given 5/4/21 0948)   propranolol (INDERAL) tablet 20 mg (20 mg Oral Not Given 5/4/21 0949)   QUEtiapine (SEROquel) tablet 300 mg (300 mg Oral Given 5/3/21 2239)   Valbenazine Tosylate CAPS 80 mg (80 mg Oral Not Given 5/4/21 0952) prazosin (MINIPRESS) capsule 2 mg (2 mg Oral Not Given 5/4/21 0949)   DULoxetine (CYMBALTA) delayed release capsule 60 mg (60 mg Oral Given 5/4/21 0948)   senna-docusate sodium (SENOKOT S) 8 6-50 mg per tablet 1 tablet (1 tablet Oral Given 5/3/21 2239)   polyethylene glycol (MIRALAX) packet 17 g (17 g Oral Given 5/4/21 0948)   hydrOXYzine HCL (ATARAX) tablet 25 mg (25 mg Oral Given 5/2/21 1935)   calcium carbonate (TUMS) chewable tablet 500 mg (500 mg Oral Given 5/4/21 1136)   sodium chloride 0 9 % infusion (0 mL/hr Intravenous Stopped 5/3/21 1746)   morphine (MS CONTIN) ER tablet 15 mg (15 mg Oral Given 5/4/21 1130)   oxyCODONE (ROXICODONE) IR tablet 5 mg (has no administration in time range)   ketorolac (TORADOL) injection 15 mg (has no administration in time range)   oxyCODONE (ROXICODONE) immediate release tablet 10 mg (10 mg Oral Given 5/2/21 1519)   ketorolac (TORADOL) injection 30 mg (30 mg Intramuscular Given 5/2/21 1520)   OLANZapine (ZyPREXA ZYDIS) dispersible tablet 5 mg (5 mg Oral Given 5/2/21 1545)   potassium chloride (K-DUR,KLOR-CON) CR tablet 40 mEq (40 mEq Oral Given 5/3/21 0919)   potassium chloride (K-DUR,KLOR-CON) CR tablet 40 mEq (40 mEq Oral Given 5/3/21 1214)   potassium chloride 20 mEq IVPB (premix) (20 mEq Intravenous New Bag 5/3/21 1547)   lactated ringers bolus 500 mL (500 mL Intravenous New Bag 5/3/21 1216)   potassium chloride (K-DUR,KLOR-CON) CR tablet 40 mEq (40 mEq Oral Given 5/4/21 0948)     XR foot 3+ vw left   Final Result      Severe hallux valgus deformity  Lisfranc alignment appears grossly maintained  Workstation performed: [id number]         CT lower extremity wo contrast right   Final Result   1  Intact Lisfranc ligament  2   Mildly displaced fractures noted at the 2nd through 4th metatarsal bases without intra-articular component and without additional fracture identified              Workstation performed: [id number]         XR foot 3+ views RIGHT   ED Interpretation   Abnormal   Fractures of base of 2/3/4 MTPs  No Lis Franc  Not really widened  Final Result      Unchanged fractures of the 2nd and 4th metatarsals    Small intra-articular fracture base of 3rd metatarsal             Workstation performed: [id number]           Orders Placed This Encounter   Procedures    Splint application    Crutches    XR foot 3+ views RIGHT    CT lower extremity wo contrast right    XR foot 3+ vw left    Basic metabolic panel    CBC and differential    Basic metabolic panel    Urinalysis with microscopic    Basic metabolic panel    CBC    Magnesium    Basic metabolic panel    CBC    Diet Regular; Regular House    Nursing Communication Please document complete set of vital signs    Vital Signs per unit routine    Up with assistance    Apply SCD or Foot pumps    Urinary retention protocol    Level 1-Full Code: all life saving measures are indicated    Inpatient consult to Case Management    Inpatient consult to Podiatry    OT eval and treat    PT eval and treat    Place in Observation    Inpatient Admission     Labs Reviewed - No data to display  Time reflects when diagnosis was documented in both MDM as applicable and the Disposition within this note     Time User Action Codes Description Comment    5/2/2021  4:09 PM Lory Mirza Add [C52 405M] Metatarsal fracture     5/2/2021  4:09 PM Lory Mirza Modify [S92 309A] Metatarsal fracture Right 2nd and 4th    5/2/2021  4:09 PM Lory Mirza Add [R26 2] Ambulatory dysfunction     5/2/2021  4:09 PM Lory Mirza Add [M54 9,  G89 29] Chronic back pain     5/2/2021  4:09 PM Lory Mirza Add [M25 561,  M25 562,  G89 29] Bilateral chronic knee pain     5/2/2021  4:09 PM Lory Mirza Add [R29 6] Frequent falls     5/2/2021  5:31 PM Lory Mirza Modify [S92 309A] Metatarsal fracture Right 2nd, 3rd, and 4th      ED Disposition     ED Disposition Condition Date/Time Comment    Admit Stable Lucille Briggs May 2, 2021  4:09 PM Case was discussed with SOD resident Dr Pedro Krishnan and the patient's admission status was agreed to be Admission Status: observation status to the service of Dr Catalina Angeles           Follow-up Information     Follow up With Specialties Details Why Contact Info    Garland Bence, DPM Podiatry, Wound Care Schedule an appointment as soon as possible for a visit in 1 week(s)  [address] [facility]  [phone]          Current Discharge Medication List      CONTINUE these medications which have NOT CHANGED    Details   acetaminophen (TYLENOL) 325 mg tablet Take 2 tablets (650 mg total) by mouth every 8 (eight) hours as needed for mild pain  Qty: 60 tablet, Refills: 2    Associated Diagnoses: Ambulatory dysfunction; Lumbar radiculopathy      amLODIPine (NORVASC) 5 mg tablet Take 1 tablet (5 mg total) by mouth daily  Qty: 90 tablet, Refills: 3    Associated Diagnoses: Hypertension, unspecified type      bisacodyl (DULCOLAX) 5 mg EC tablet Take 5 mg by mouth daily as needed for constipation      busPIRone (BUSPAR) 15 mg tablet Take 1 tablet (15 mg total) by mouth 3 (three) times a day  Qty: 90 tablet, Refills: 0    Associated Diagnoses: Generalized anxiety disorder      cetirizine (ZyrTEC) 10 mg tablet Take 10 mg by mouth as needed       cholecalciferol (VITAMIN D3) 1,000 units tablet Take 1 tablet (1,000 Units total) by mouth daily  Qty: 90 tablet, Refills: 5    Associated Diagnoses: Vitamin D deficiency      [facility] Vitamin C 500 MG tablet TAKE 1 TABLET BY MOUTH TWICE A DAY  Qty: 60 tablet, Refills: 0    Associated Diagnoses: Primary osteoarthritis of right knee; Preop testing      Diapers & Supplies (Huggies Pull-Ups) MISC Use 3 (three) times a day  Qty: 100 each, Refills: 3    Associated Diagnoses: Urge incontinence of urine      diclofenac sodium (VOLTAREN) 1 % APPLY 4 G TOPICALLY 4 (FOUR) TIMES A DAY  Qty: 100 g, Refills: 0    Associated Diagnoses: Lumbar radiculopathy DULoxetine (CYMBALTA) 60 mg delayed release capsule TAKE 1 CAPSULE BY MOUTH EVERY DAY  Qty: 30 capsule, Refills: 0    Associated Diagnoses: Generalized anxiety disorder      ferrous sulfate 324 (65 Fe) mg TAKE 1 TABLET (324 MG TOTAL) BY MOUTH 2 (TWO) TIMES A DAY BEFORE MEALS  Qty: 60 tablet, Refills: 1    Associated Diagnoses: Primary osteoarthritis of right knee; Preop testing      folic acid (FOLVITE) 1 mg tablet TAKE 1 TABLET BY MOUTH EVERY DAY  Qty: 30 tablet, Refills: 1    Associated Diagnoses: Primary osteoarthritis of right knee; Preop testing      hydrOXYzine HCL (ATARAX) 50 mg tablet Take 1 tablet (50 mg total) by mouth daily at bedtime as needed for anxiety (insomnia)  Qty: 90 tablet, Refills: 0    Associated Diagnoses: Anxiety      ipratropium-albuterol (DUO-NEB) 0 5-2 5 mg/3 mL nebulizer solution Take 1 vial (3 mL total) by nebulization every 6 (six) hours as needed for wheezing or shortness of breath  Qty: 3 mL, Refills: 2    Associated Diagnoses: Dyspnea      lidocaine (LMX) 4 % cream Apply topically as needed for mild pain  Qty: 30 g, Refills: 0    Associated Diagnoses: Lumbar radiculopathy      LORazepam (ATIVAN) 0 5 mg tablet Take 1 tablet (0 5 mg total) by mouth every 8 (eight) hours as needed for anxiety for up to 10 days Hold if sedated  Qty: 20 tablet, Refills: 0    Associated Diagnoses: Ambulatory dysfunction      morphine (MS CONTIN) 15 mg 12 hr tablet Take 1 tablet (15 mg total) by mouth 2 (two) times a day Hold if sedatedMax Daily Amount: 30 mg  Qty: 30 tablet, Refills: 0    Comments: PDMP checked and verified    Associated Diagnoses: Lumbar radiculopathy; Chronic pain disorder      Mouthwashes (Biotene Dry Mouth) LIQD Apply 5 mL to the mouth or throat daily      Multiple Vitamins-Minerals (multivitamin with minerals) tablet Take 1 tablet by mouth daily  Qty: 30 tablet, Refills: 1    Associated Diagnoses: Primary osteoarthritis of right knee; Preop testing      naloxone (NARCAN) 4 mg/0 1 mL nasal spray Administer 1 spray into a nostril  If no response after 2-3 minutes, give another dose in the other nostril using a new spray    Qty: 1 each, Refills: 1    Associated Diagnoses: Lumbar radiculopathy; Chronic pain disorder      nortriptyline (PAMELOR) 10 mg capsule Take 1 capsule (10 mg total) by mouth daily at bedtime  Qty: 7 capsule, Refills: 0    Associated Diagnoses: Insomnia      omeprazole (PriLOSEC) 20 mg delayed release capsule Take 1 capsule (20 mg total) by mouth daily  Qty: 90 capsule, Refills: 3    Associated Diagnoses: Gastroesophageal reflux disease without esophagitis      ondansetron (ZOFRAN-ODT) 4 mg disintegrating tablet Take 1 tablet (4 mg total) by mouth every 6 (six) hours as needed for nausea or vomiting  Qty: 20 tablet, Refills: 0    Associated Diagnoses: Vomiting      oxybutynin (DITROPAN) 5 mg tablet Take 1 tablet (5 mg total) by mouth 2 (two) times a day  Qty: 180 tablet, Refills: 3    Associated Diagnoses: Urge incontinence of urine      oxyCODONE (ROXICODONE) 5 mg immediate release tablet Take 1 tablet (5 mg total) by mouth dailyMax Daily Amount: 5 mg  Qty: 15 tablet, Refills: 0    Associated Diagnoses: Lumbar radiculopathy; Chronic pain disorder      Polyethylene Glycol 3350 (MIRALAX PO) Take by mouth      potassium chloride (K-DUR,KLOR-CON) 10 mEq tablet Take 2 tablets (20 mEq total) by mouth 2 (two) times a day for 2 days  Qty: 8 tablet, Refills: 0    Associated Diagnoses: Hypokalemia      potassium chloride (MICRO-K) 10 MEQ CR capsule Take 10 mEq by mouth 2 (two) times a day Two tabs, two times daily      prazosin (MINIPRESS) 2 mg capsule TAKE 1 CAPSULE BY MOUTH EVERY DAY  Qty: 30 capsule, Refills: 1    Associated Diagnoses: Hypertension, unspecified type      !! pregabalin (LYRICA) 150 mg capsule Take 1 capsule (150 mg total) by mouth 3 (three) times a day  Qty: 90 capsule, Refills: 2    Associated Diagnoses: Lumbar radiculopathy      !! pregabalin (LYRICA) 225 MG capsule TAKE 1 CAPSULE (225 MG TOTAL) BY MOUTH EVERY 12 (TWELVE) HOURS  Qty: 60 capsule, Refills: 0    Comments: Not to exceed 5 additional fills before 09/05/2021 DX Code Needed  PT REQUEST REFILL  Associated Diagnoses: Lumbar radiculopathy; Chronic pain disorder      propranolol (INDERAL) 20 mg tablet Take 1 tablet (20 mg total) by mouth 2 (two) times a day  Qty: 180 tablet, Refills: 3    Associated Diagnoses: Tremor      QUEtiapine (SEROquel) 300 mg tablet Take 300 mg by mouth daily at bedtime      Respiratory Therapy Supplies (Nebulizer) YUDY Use as needed (Shortness of breath)  Qty: 1 each, Refills: 0    Associated Diagnoses: Dyspnea      Sennosides (SENEXON PO) Take by mouth      SIMETHICONE PO Take by mouth as needed       sodium chloride (OCEAN) 0 65 % nasal spray 2 sprays into each nostril as needed      traZODone (DESYREL) 100 mg tablet Take 100 mg by mouth daily at bedtime 2 tablets daily at bedtime      triamcinolone (KENALOG) 0 025 % cream Apply topically 2 (two) times a day  Qty: 30 g, Refills: 0    Associated Diagnoses: Rash      Valbenazine Tosylate (Ingrezza) 80 MG CAPS Take 80 mg by mouth daily  Qty: 30 capsule, Refills: 1    Associated Diagnoses: Tardive dyskinesia       !! - Potential duplicate medications found  Please discuss with provider  No discharge procedures on file  Prior to Admission Medications   Prescriptions Last Dose Informant Patient Reported? Taking?    CVS Vitamin C 500 MG tablet Unknown at Unknown time  No No   Sig: TAKE 1 TABLET BY MOUTH TWICE A DAY   DULoxetine (CYMBALTA) 60 mg delayed release capsule Unknown at Unknown time  No No   Sig: TAKE 1 CAPSULE BY MOUTH EVERY DAY   Diapers & Supplies (Huggies Pull-Ups) MISC   No No   Sig: Use 3 (three) times a day   LORazepam (ATIVAN) 0 5 mg tablet   No No   Sig: Take 1 tablet (0 5 mg total) by mouth every 8 (eight) hours as needed for anxiety for up to 10 days Hold if sedated   Patient not taking: Reported on 11/27/2020   Mouthwashes (Biotene Dry Mouth) LIQD Unknown at Unknown time  Yes No   Sig: Apply 5 mL to the mouth or throat daily   Multiple Vitamins-Minerals (multivitamin with minerals) tablet Unknown at Unknown time Outside Facility (Specify) No No   Sig: Take 1 tablet by mouth daily   Polyethylene Glycol 3350 (MIRALAX PO) Unknown at Unknown time Outside Facility (Specify) Yes No   Sig: Take by mouth   QUEtiapine (SEROquel) 300 mg tablet Unknown at Unknown time Self Yes No   Sig: Take 300 mg by mouth daily at bedtime   Respiratory Therapy Supplies (Nebulizer) YUDY Unknown at Unknown time  No No   Sig: Use as needed (Shortness of breath)   Patient not taking: Reported on 4/20/2021   SIMETHICONE PO Unknown at Unknown time Outside Facility (Specify) Yes No   Sig: Take by mouth as needed    Sennosides (SENEXON PO) Unknown at Unknown time Outside Facility (Specify) Yes No   Sig: Take by mouth   Valbenazine Tosylate (Ingrezza) 80 MG CAPS Unknown at Unknown time  No No   Sig: Take 80 mg by mouth daily   acetaminophen (TYLENOL) 325 mg tablet Unknown at Unknown time  No No   Sig: Take 2 tablets (650 mg total) by mouth every 8 (eight) hours as needed for mild pain   amLODIPine (NORVASC) 5 mg tablet  Outside Facility (Specify) No No   Sig: Take 1 tablet (5 mg total) by mouth daily   bisacodyl (DULCOLAX) 5 mg EC tablet Unknown at Unknown time Outside Facility (Specify) Yes No   Sig: Take 5 mg by mouth daily as needed for constipation   busPIRone (BUSPAR) 15 mg tablet   No No   Sig: Take 1 tablet (15 mg total) by mouth 3 (three) times a day   cetirizine (ZyrTEC) 10 mg tablet Unknown at Unknown time Outside Facility (Specify) Yes No   Sig: Take 10 mg by mouth as needed    cholecalciferol (VITAMIN D3) 1,000 units tablet  Outside Facility (Specify) No No   Sig: Take 1 tablet (1,000 Units total) by mouth daily   diclofenac sodium (VOLTAREN) 1 % Unknown at Unknown time Outside Facility (Specify) No No   Sig: APPLY 4 G TOPICALLY 4 (FOUR) TIMES A DAY Patient taking differently: Apply 4 g topically 2 (two) times a day    ferrous sulfate 324 (65 Fe) mg Unknown at Unknown time  No No   Sig: TAKE 1 TABLET (324 MG TOTAL) BY MOUTH 2 (TWO) TIMES A DAY BEFORE MEALS   folic acid (FOLVITE) 1 mg tablet Unknown at Unknown time  No No   Sig: TAKE 1 TABLET BY MOUTH EVERY DAY   hydrOXYzine HCL (ATARAX) 50 mg tablet Unknown at Unknown time  No No   Sig: Take 1 tablet (50 mg total) by mouth daily at bedtime as needed for anxiety (insomnia)   ipratropium-albuterol (DUO-NEB) 0 5-2 5 mg/3 mL nebulizer solution Unknown at Unknown time  No No   Sig: Take 1 vial (3 mL total) by nebulization every 6 (six) hours as needed for wheezing or shortness of breath   Patient not taking: Reported on 4/20/2021   lidocaine (LMX) 4 % cream Unknown at Unknown time Outside Facility (Specify) No No   Sig: Apply topically as needed for mild pain   Patient not taking: Reported on 2/24/2021   morphine (MS CONTIN) 15 mg 12 hr tablet Unknown at Unknown time  No No   Sig: Take 1 tablet (15 mg total) by mouth 2 (two) times a day Hold if sedatedMax Daily Amount: 30 mg   naloxone (NARCAN) 4 mg/0 1 mL nasal spray Unknown at Unknown time  No No   Sig: Administer 1 spray into a nostril  If no response after 2-3 minutes, give another dose in the other nostril using a new spray     nortriptyline (PAMELOR) 10 mg capsule Unknown at Unknown time  No No   Sig: Take 1 capsule (10 mg total) by mouth daily at bedtime   omeprazole (PriLOSEC) 20 mg delayed release capsule Unknown at Unknown time  No No   Sig: Take 1 capsule (20 mg total) by mouth daily   ondansetron (ZOFRAN-ODT) 4 mg disintegrating tablet Unknown at Unknown time Outside Facility (Specify) No No   Sig: Take 1 tablet (4 mg total) by mouth every 6 (six) hours as needed for nausea or vomiting   Patient not taking: Reported on 2/24/2021   oxyCODONE (ROXICODONE) 5 mg immediate release tablet Unknown at Unknown time  No No   Sig: Take 1 tablet (5 mg total) by mouth dailyMax Daily Amount: 5 mg   oxybutynin (DITROPAN) 5 mg tablet Unknown at Unknown time  No No   Sig: Take 1 tablet (5 mg total) by mouth 2 (two) times a day   potassium chloride (K-DUR,KLOR-CON) 10 mEq tablet   No No   Sig: Take 2 tablets (20 mEq total) by mouth 2 (two) times a day for 2 days   potassium chloride (MICRO-K) 10 MEQ CR capsule Unknown at Unknown time Self Yes No   Sig: Take 10 mEq by mouth 2 (two) times a day Two tabs, two times daily   prazosin (MINIPRESS) 2 mg capsule Unknown at Unknown time Outside Facility (Specify) No No   Sig: TAKE 1 CAPSULE BY MOUTH EVERY DAY   pregabalin (LYRICA) 150 mg capsule Unknown at Unknown time Outside Facility (Specify) No No   Sig: Take 1 capsule (150 mg total) by mouth 3 (three) times a day   Patient not taking: Reported on 2021   pregabalin (LYRICA) 225 MG capsule Unknown at Unknown time  No No   Sig: TAKE 1 CAPSULE (225 MG TOTAL) BY MOUTH EVERY 12 (TWELVE) HOURS   propranolol (INDERAL) 20 mg tablet Unknown at Unknown time  No No   Sig: Take 1 tablet (20 mg total) by mouth 2 (two) times a day   sodium chloride (OCEAN) 0 65 % nasal spray Unknown at Unknown time  Yes No   Si sprays into each nostril as needed   traZODone (DESYREL) 100 mg tablet Unknown at Unknown time Self Yes No   Sig: Take 100 mg by mouth daily at bedtime 2 tablets daily at bedtime   triamcinolone (KENALOG) 0 025 % cream Unknown at Unknown time Outside Facility (Specify) No No   Sig: Apply topically 2 (two) times a day   Patient not taking: Reported on 2021      Facility-Administered Medications: None       Portions of the record may have been created with voice recognition software  Occasional wrong word or "sound a like" substitutions may have occurred due to the inherent limitations of voice recognition software  Read the chart carefully and recognize, using context, where substitutions have occurred      Electronically signed by:  Bob Arevalo

## 2021-05-02 NOTE — ED PROVIDER NOTES
History  Chief Complaint   Patient presents with    Personal Problem     pt called EMS to bring her here to [place] so we could get her into [facility] because she has a broken foot and her son is not home to care for her  she has pain medications at home  pt was able to walk to ambulance with walker without problem   Difficulty Walking     HPI   61 yo female with PMH chronic back pain with spinal cord stimulator, chronic bilateral knee pain, bipolar disorder, anxiety, presents to the ED via EMS with concern for right foot pain  Pt reports pain started after she fell 2 days ago  Denies head strike or LOC  Denies any other injuries from the fall  Back pain and bilateral knee pain unchanged from baseline  She has been able to ambulate since the incident  She was evaluated in the ED at [facility] yesterday and found to have nondisplaced fractures of the right 2nd, 4th, and possibly 3rd metatarsal  Pt was placed in hard soled orthopedic shoe and discharged  Unclear if pt was advised to remain nonweight bearing, but she has been ambulating at home on the foot with a walker  Pt took 5mg oxycodone at 0600 this morning with some relief but pain returned  Also reports some nausea and feeling very anxious this morning  Pt reports pain is now more severe and does not think that she can remain at home, she is requesting to be admitted to a skilled nursing facility  Pt lives with her son  Prior to Admission Medications   Prescriptions Last Dose Informant Patient Reported? Taking?    [facility] Vitamin C 500 MG tablet   No No   Sig: TAKE 1 TABLET BY MOUTH TWICE A DAY   DULoxetine (CYMBALTA) 60 mg delayed release capsule   No No   Sig: TAKE 1 CAPSULE BY MOUTH EVERY DAY   Diapers & Supplies (Huggies Pull-Ups) MISC   No No   Sig: Use 3 (three) times a day   LORazepam (ATIVAN) 0 5 mg tablet   No No   Sig: Take 1 tablet (0 5 mg total) by mouth every 8 (eight) hours as needed for anxiety for up to 10 days Hold if sedated Patient not taking: Reported on 11/27/2020   Mouthwashes (Biotene Dry Mouth) LIQD   Yes No   Sig: Apply 5 mL to the mouth or throat daily   Multiple Vitamins-Minerals (multivitamin with minerals) tablet  Outside Facility (Specify) No No   Sig: Take 1 tablet by mouth daily   Polyethylene Glycol 3350 (MIRALAX PO)  Outside Facility (Specify) Yes No   Sig: Take by mouth   QUEtiapine (SEROquel) 300 mg tablet  Self Yes No   Sig: Take 300 mg by mouth daily at bedtime   Respiratory Therapy Supplies (Nebulizer) YUDY   No No   Sig: Use as needed (Shortness of breath)   Patient not taking: Reported on 4/20/2021   SIMETHICONE PO  Outside Facility (Specify) Yes No   Sig: Take by mouth as needed    Sennosides (SENEXON PO)  Outside Facility (Specify) Yes No   Sig: Take by mouth   Valbenazine Tosylate (Ingrezza) 80 MG CAPS   No No   Sig: Take 80 mg by mouth daily   acetaminophen (TYLENOL) 325 mg tablet   No No   Sig: Take 2 tablets (650 mg total) by mouth every 8 (eight) hours as needed for mild pain   amLODIPine (NORVASC) 5 mg tablet  Outside Facility (Specify) No No   Sig: Take 1 tablet (5 mg total) by mouth daily   bisacodyl (DULCOLAX) 5 mg EC tablet  Outside Facility (Specify) Yes No   Sig: Take 5 mg by mouth daily as needed for constipation   busPIRone (BUSPAR) 15 mg tablet   No No   Sig: Take 1 tablet (15 mg total) by mouth 3 (three) times a day   cetirizine (ZyrTEC) 10 mg tablet  Outside Facility (Specify) Yes No   Sig: Take 10 mg by mouth as needed    cholecalciferol (VITAMIN D3) 1,000 units tablet  Outside Facility (Specify) No No   Sig: Take 1 tablet (1,000 Units total) by mouth daily   diclofenac sodium (VOLTAREN) 1 %  Outside Facility (Specify) No No   Sig: APPLY 4 G TOPICALLY 4 (FOUR) TIMES A DAY   Patient taking differently: Apply 4 g topically 2 (two) times a day    ferrous sulfate 324 (65 Fe) mg   No No   Sig: TAKE 1 TABLET (324 MG TOTAL) BY MOUTH 2 (TWO) TIMES A DAY BEFORE MEALS   folic acid (FOLVITE) 1 mg tablet No No   Sig: TAKE 1 TABLET BY MOUTH EVERY DAY   hydrOXYzine HCL (ATARAX) 50 mg tablet   No No   Sig: Take 1 tablet (50 mg total) by mouth daily at bedtime as needed for anxiety (insomnia)   ipratropium-albuterol (DUO-NEB) 0 5-2 5 mg/3 mL nebulizer solution   No No   Sig: Take 1 vial (3 mL total) by nebulization every 6 (six) hours as needed for wheezing or shortness of breath   Patient not taking: Reported on 4/20/2021   lidocaine (LMX) 4 % cream  Outside Facility (Specify) No No   Sig: Apply topically as needed for mild pain   Patient not taking: Reported on 2/24/2021   morphine (MS CONTIN) 15 mg 12 hr tablet   No No   Sig: Take 1 tablet (15 mg total) by mouth 2 (two) times a day Hold if sedatedMax Daily Amount: 30 mg   naloxone (NARCAN) 4 mg/0 1 mL nasal spray   No No   Sig: Administer 1 spray into a nostril  If no response after 2-3 minutes, give another dose in the other nostril using a new spray     nortriptyline (PAMELOR) 10 mg capsule   No No   Sig: Take 1 capsule (10 mg total) by mouth daily at bedtime   omeprazole (PriLOSEC) 20 mg delayed release capsule   No No   Sig: Take 1 capsule (20 mg total) by mouth daily   ondansetron (ZOFRAN-ODT) 4 mg disintegrating tablet  Outside Facility (Specify) No No   Sig: Take 1 tablet (4 mg total) by mouth every 6 (six) hours as needed for nausea or vomiting   Patient not taking: Reported on 2/24/2021   oxyCODONE (ROXICODONE) 5 mg immediate release tablet   No No   Sig: Take 1 tablet (5 mg total) by mouth dailyMax Daily Amount: 5 mg   oxybutynin (DITROPAN) 5 mg tablet   No No   Sig: Take 1 tablet (5 mg total) by mouth 2 (two) times a day   potassium chloride (K-DUR,KLOR-CON) 10 mEq tablet   No No   Sig: Take 2 tablets (20 mEq total) by mouth 2 (two) times a day for 2 days   potassium chloride (MICRO-K) 10 MEQ CR capsule  Self Yes No   Sig: Take 10 mEq by mouth 2 (two) times a day Two tabs, two times daily   prazosin (MINIPRESS) 2 mg capsule  Outside Facility (Specify) No No   Sig: TAKE 1 CAPSULE BY MOUTH EVERY DAY   pregabalin (LYRICA) 150 mg capsule  Outside Facility (Specify) No No   Sig: Take 1 capsule (150 mg total) by mouth 3 (three) times a day   Patient not taking: Reported on 2021   pregabalin (LYRICA) 225 MG capsule   No No   Sig: TAKE 1 CAPSULE (225 MG TOTAL) BY MOUTH EVERY 12 (TWELVE) HOURS   propranolol (INDERAL) 20 mg tablet   No No   Sig: Take 1 tablet (20 mg total) by mouth 2 (two) times a day   sodium chloride (OCEAN) 0 65 % nasal spray   Yes No   Si sprays into each nostril as needed   traZODone (DESYREL) 100 mg tablet  Self Yes No   Sig: Take 100 mg by mouth daily at bedtime 2 tablets daily at bedtime   triamcinolone (KENALOG) 0 025 % cream  Outside Facility (Specify) No No   Sig: Apply topically 2 (two) times a day   Patient not taking: Reported on 2021      Facility-Administered Medications: None       Past Medical History:   Diagnosis Date    Acid reflux     Anxiety     RESOLVED: [id number]    Arthritis     Bipolar 2 disorder (HCC)     FOLLOWS WITH PSYCHIATRIST  CONTINUE LAMOTRIGINE; RESOLVED: [id number]    Depression     Familial tremor     both hands    Fibromyalgia     LAST ASSESSED: [id number]    Hearing aid worn     left ear    [place] (hard of hearing)     left ear    Hypertension     Left-sided weakness     Lower back pain     Memory loss of unknown cause     long and short term    Migraine     Obesity     Overactive bladder     Panic attack     Post traumatic stress disorder     Seasonal allergies     Stroke [facility])     questionable stroke 2009    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    CONT ASA 81 MG DAILY; RESOLVED: [id number]    Urinary incontinence     Wears dentures     partial lower / full upper    Wears glasses        Past Surgical History:   Procedure Laterality Date    BACK SURGERY       SECTION      COLONOSCOPY      RESOLVED: [id number]    EAR SURGERY      EGD      HYSTERECTOMY 2004    MYRINGOTOMY W/ TUBES Left     NECK SURGERY  04/2019    OH CYSTOURETHROSCOPY N/A 2/18/2016    Procedure: CYSTOSCOPY, BOTOX INJECTION;  Surgeon: Charles Corbett MD;  Location: [facility];  Service: Gynecology    OH IMPLANT SPINAL NEUROSTIM/ Right 2/10/2021    Procedure: REPLACEMENT IMPLANTABLE PULSE GENERATOR DORSAL SPINAL COLUMN STIMULATOR, RIGHT;  Surgeon: Allison Kehr, MD;  Location: [facility];  Service: Neurosurgery    OH PERCUT IMPLNT [address] Right 7/28/2020    Procedure: INSERTION THORACIC DORSAL COLUMN SPINAL CORD STIMULATOR PERCUTANEOUS W IMPLANTABLE PULSE GENERATOR, RIGHT;  Surgeon: Allison Kehr, MD;  Location: [facility];  Service: Neurosurgery    TONSILLECTOMY      TUBAL LIGATION  1986    UPPER GASTROINTESTINAL ENDOSCOPY  09/2020       Family History   Problem Relation Age of Onset    Colon cancer Mother     Alzheimer's disease Father     Stroke Father     Colon cancer Brother     Bipolar disorder Brother     Breast cancer Maternal Aunt     Colon cancer Maternal Aunt     Heart disease Other     Diabetes Other     Hypertension Other     Seizures Son     Depression Paternal Grandfather     No Known Problems Sister     No Known Problems Brother     Thyroid disease Neg Hx      I have reviewed and agree with the history as documented  E-Cigarette/Vaping    E-Cigarette Use Never User      E-Cigarette/Vaping Substances    Nicotine No     THC No     CBD No     Flavoring No     Other No     Unknown No      Social History     Tobacco Use    Smoking status: Never Smoker    Smokeless tobacco: Never Used   Substance Use Topics    Alcohol use: Not Currently     Comment: 2 x year; being a social drinker as per all scripts     Drug use: No        Review of Systems   Constitutional: Negative for chills and fever  HENT: Negative for congestion, rhinorrhea and sore throat  Respiratory: Negative for chest tightness and shortness of breath  Cardiovascular: Negative for chest pain  Gastrointestinal: Positive for nausea  Negative for abdominal pain, diarrhea and vomiting  Genitourinary: Negative for dysuria and hematuria  Musculoskeletal: Positive for arthralgias, back pain, gait problem and myalgias  Skin: Negative for rash  Neurological: Negative for dizziness, syncope, weakness, light-headedness, numbness and headaches  Psychiatric/Behavioral: The patient is nervous/anxious  All other systems reviewed and are negative  Physical Exam  ED Triage Vitals [05/02/21 1341]   Temperature Pulse Resp BP SpO2   (!) 97 3 °F (36 3 °C) 85 -- -- 94 %      Temp Source Heart Rate Source Patient Position - Orthostatic VS BP Location FiO2 (%)   Axillary -- -- -- --      Pain Score       --             Orthostatic Vital Signs  Vitals:    05/02/21 1341   Pulse: 85       Physical Exam  Vitals signs and nursing note reviewed  Constitutional:       General: She is in acute distress (wailing in discomfort)  Appearance: She is well-developed  She is not diaphoretic  HENT:      Head: Normocephalic and atraumatic  Right Ear: External ear normal       Left Ear: External ear normal       Nose: Nose normal    Eyes:      Conjunctiva/sclera: Conjunctivae normal       Pupils: Pupils are equal, round, and reactive to light  Neck:      Musculoskeletal: Normal range of motion and neck supple  Cardiovascular:      Rate and Rhythm: Normal rate and regular rhythm  Heart sounds: Normal heart sounds  No murmur  No friction rub  No gallop  Pulmonary:      Effort: Pulmonary effort is normal  No respiratory distress  Breath sounds: Normal breath sounds  No wheezing, rhonchi or rales  Abdominal:      General: Bowel sounds are normal  There is no distension  Palpations: Abdomen is soft  There is no mass  Tenderness: There is no abdominal tenderness  There is no guarding or rebound  Musculoskeletal: Normal range of motion  Right knee: She exhibits normal range of motion  No tenderness found  Left knee: She exhibits normal range of motion  No tenderness found  Right ankle: No tenderness  Thoracic back: She exhibits tenderness  Lumbar back: She exhibits tenderness  Right foot: Tenderness present  Feet:    Lymphadenopathy:      Cervical: No cervical adenopathy  Skin:     General: Skin is warm and dry  Neurological:      Mental Status: She is alert and oriented to person, place, and time  Cranial Nerves: No cranial nerve deficit  Sensory: No sensory deficit  Psychiatric:         Mood and Affect: Mood is anxious  ED Medications  Medications   oxyCODONE (ROXICODONE) immediate release tablet 10 mg (10 mg Oral Given 5/2/21 1519)   ketorolac (TORADOL) injection 30 mg (30 mg Intramuscular Given 5/2/21 1520)   OLANZapine (ZyPREXA ZYDIS) dispersible tablet 5 mg (5 mg Oral Given 5/2/21 1545)       Diagnostic Studies  Results Reviewed     None                 XR foot 3+ views RIGHT   ED Interpretation by Marilou Minaya MD (05/02 1526)   Abnormal   Fractures of base of 2/3/4 MTPs  No Lis Franc  Not really widened  Final Result by Miranda Pacheco MD (05/02 1514)      Unchanged fractures of the 2nd and 4th metatarsals    Small intra-articular fracture base of 3rd metatarsal             Workstation performed: [id number]               Procedures  Splint application    Date/Time: 5/2/2021 5:29 PM  Performed by: Barrie Kelley MD  Authorized by: Barrie Kelley MD   Universal Protocol:  Risks and benefits: risks, benefits and alternatives were discussed  Consent given by: patient  Patient understanding: patient states understanding of the procedure being performed  Patient identity confirmed: verbally with patient      Pre-procedure details:     Sensation:  Normal  Procedure details:     Laterality:  Right    Location:  Foot    Foot:  R foot    Splint type:  Short leg (posterior and garrett)    Supplies:  Cotton padding, elastic bandage and Ortho-Glass  Post-procedure details:     Pain:  Unchanged    Sensation:  Normal    Patient tolerance of procedure: Tolerated well, no immediate complications          ED Course  ED Course as of May 02 1721   Sun May 02, 2021   1523 Unchanged nondisplaced fractures at the bases of the 2nd and 4th metatarsals  Small intra-articular fracture at the base of the 3rd metatarsal   No significant widening between the 1st and 2nd metatarsals to suggest Lisfranc injury  XR foot 3+ views RIGHT    Will splint  Pt c/o feeling very anxious  Will give a dose of zyprexa  54 Son arrived to the ED  Reports that pt was recently in rehab at [facility] in March after a procedure on her back  Pt has been at his home for the last month but he is very concerned about her falling when he is not home (works 2 jobs and is gone all day), and pt did indeed fall 2 days ago and sustain new fracture  With her ongoing severe back pain and knee pain, and new foot injury pt is at high risk for falling at home and it is reasonable for pt to be evaluated by PT for possible placement back in skilled nursing facility  Discussed with PT, but they are not available to evaluate until tomorrow  Will admit to medicine for observation  Admitted to [facility]                                           MDM  59 yo female with PMH chronic back pain with spinal cord stimulator, chronic bilateral knee pain, bipolar disorder, anxiety, presenting with right foot pain and concern for inability to get around safely/not enough support at home  Pt has known 2nd and 4th metatarsal fracture seen on XR yesterday and has upcoming appointment scheduled with podiatry, however she was not splinted and has been bearing weight on the affected foot at home  Will repeat XR, apply splint, and provide crutches   Son is on his way to the ED, will discuss with him to determine if pt able to be discharged  Disposition  Final diagnoses:   Metatarsal fracture - Right 2nd, 3rd, and 4th   Ambulatory dysfunction   Chronic back pain   Bilateral chronic knee pain   Frequent falls     Time reflects when diagnosis was documented in both MDM as applicable and the Disposition within this note     Time User Action Codes Description Comment    5/2/2021  4:09 PM [place] Add [C82 858K] Metatarsal fracture     5/2/2021  4:09 PM [place] Modify [I15 584U] Metatarsal fracture Right 2nd and 4th    5/2/2021  4:09 PM [place] Add [R26 2] Ambulatory dysfunction     5/2/2021  4:09 PM [place] Add [M54 9,  G89 29] Chronic back pain     5/2/2021  4:09 PM [place] Add [W02 297,  M25 562,  G89 29] Bilateral chronic knee pain     5/2/2021  4:09 PM [place] Add [R29 6] Frequent falls     5/2/2021  5:31 PM [place] Modify [S89 698L] Metatarsal fracture Right 2nd, 2rd, and 4th      ED Disposition     ED Disposition Condition Date/Time Comment    Admit Stable Sun May 2, 2021  4:09 PM Case was discussed with [facility] resident Dr Aidee Pepe and the patient's admission status was agreed to be Admission Status: observation status to the service of Dr Shruthi Mendez   Follow-up Information    None         Patient's Medications   Discharge Prescriptions    No medications on file     No discharge procedures on file  PDMP Review       Value Time User    PDMP Reviewed  Yes 9/5/2020 12:53 PM Stacia Arias MD           ED Provider  Attending physically available and evaluated Samantha Zaragoza  I managed the patient along with the ED Attending      Electronically Signed by         Madalyn Shields MD  05/02/21 4506

## 2021-05-02 NOTE — ASSESSMENT & PLAN NOTE
Present at baseline though worsened in the setting of metatarsal fracture     - rehab placement pending

## 2021-05-03 ENCOUNTER — APPOINTMENT (INPATIENT)
Dept: RADIOLOGY | Facility: HOSPITAL | Age: 56
DRG: 563 | End: 2021-05-03
Payer: COMMERCIAL

## 2021-05-03 PROBLEM — N17.9 AKI (ACUTE KIDNEY INJURY) (HCC): Status: ACTIVE | Noted: 2021-05-03

## 2021-05-03 PROBLEM — R00.0 TACHYCARDIA: Status: ACTIVE | Noted: 2021-05-03

## 2021-05-03 LAB
ANION GAP SERPL CALCULATED.3IONS-SCNC: 7 MMOL/L (ref 4–13)
BUN SERPL-MCNC: 12 MG/DL (ref 5–25)
CALCIUM SERPL-MCNC: 8.7 MG/DL (ref 8.3–10.1)
CHLORIDE SERPL-SCNC: 106 MMOL/L (ref 100–108)
CO2 SERPL-SCNC: 31 MMOL/L (ref 21–32)
CREAT SERPL-MCNC: 1.51 MG/DL (ref 0.6–1.3)
GFR SERPL CREATININE-BSD FRML MDRD: 44 ML/MIN/1.73SQ M
GLUCOSE SERPL-MCNC: 96 MG/DL (ref 65–140)
POTASSIUM SERPL-SCNC: 2.9 MMOL/L (ref 3.5–5.3)
SODIUM SERPL-SCNC: 144 MMOL/L (ref 136–145)

## 2021-05-03 PROCEDURE — 97167 OT EVAL HIGH COMPLEX 60 MIN: CPT

## 2021-05-03 PROCEDURE — 97163 PT EVAL HIGH COMPLEX 45 MIN: CPT

## 2021-05-03 PROCEDURE — 99222 1ST HOSP IP/OBS MODERATE 55: CPT | Performed by: PODIATRIST

## 2021-05-03 PROCEDURE — 80048 BASIC METABOLIC PNL TOTAL CA: CPT | Performed by: INTERNAL MEDICINE

## 2021-05-03 PROCEDURE — 73700 CT LOWER EXTREMITY W/O DYE: CPT

## 2021-05-03 PROCEDURE — 73630 X-RAY EXAM OF FOOT: CPT

## 2021-05-03 RX ORDER — POTASSIUM CHLORIDE 14.9 MG/ML
20 INJECTION INTRAVENOUS ONCE
Status: COMPLETED | OUTPATIENT
Start: 2021-05-03 | End: 2021-05-03

## 2021-05-03 RX ORDER — SODIUM CHLORIDE 9 MG/ML
20 INJECTION, SOLUTION INTRAVENOUS CONTINUOUS PRN
Status: DISCONTINUED | OUTPATIENT
Start: 2021-05-03 | End: 2021-05-17 | Stop reason: HOSPADM

## 2021-05-03 RX ORDER — OXYCODONE HYDROCHLORIDE 10 MG/1
10 TABLET ORAL EVERY 4 HOURS PRN
Status: DISCONTINUED | OUTPATIENT
Start: 2021-05-03 | End: 2021-05-04

## 2021-05-03 RX ORDER — POTASSIUM CHLORIDE 20 MEQ/1
40 TABLET, EXTENDED RELEASE ORAL ONCE
Status: COMPLETED | OUTPATIENT
Start: 2021-05-03 | End: 2021-05-03

## 2021-05-03 RX ORDER — CALCIUM CARBONATE 200(500)MG
500 TABLET,CHEWABLE ORAL DAILY PRN
Status: DISCONTINUED | OUTPATIENT
Start: 2021-05-03 | End: 2021-05-17 | Stop reason: HOSPADM

## 2021-05-03 RX ORDER — OXYCODONE HYDROCHLORIDE 5 MG/1
5 TABLET ORAL EVERY 4 HOURS PRN
Status: DISCONTINUED | OUTPATIENT
Start: 2021-05-03 | End: 2021-05-04

## 2021-05-03 RX ADMIN — HEPARIN SODIUM 7500 UNITS: 5000 INJECTION INTRAVENOUS; SUBCUTANEOUS at 06:17

## 2021-05-03 RX ADMIN — BUSPIRONE HYDROCHLORIDE 15 MG: 10 TABLET ORAL at 22:39

## 2021-05-03 RX ADMIN — POTASSIUM CHLORIDE 40 MEQ: 1500 TABLET, EXTENDED RELEASE ORAL at 09:19

## 2021-05-03 RX ADMIN — ACETAMINOPHEN 975 MG: 325 TABLET ORAL at 06:17

## 2021-05-03 RX ADMIN — ACETAMINOPHEN 975 MG: 325 TABLET ORAL at 23:53

## 2021-05-03 RX ADMIN — PRAZOSIN HYDROCHLORIDE 2 MG: 2 CAPSULE ORAL at 09:19

## 2021-05-03 RX ADMIN — PREGABALIN 225 MG: 75 CAPSULE ORAL at 09:18

## 2021-05-03 RX ADMIN — HEPARIN SODIUM 7500 UNITS: 5000 INJECTION INTRAVENOUS; SUBCUTANEOUS at 22:39

## 2021-05-03 RX ADMIN — DULOXETINE HYDROCHLORIDE 60 MG: 60 CAPSULE, DELAYED RELEASE ORAL at 09:19

## 2021-05-03 RX ADMIN — PROPRANOLOL HYDROCHLORIDE 20 MG: 20 TABLET ORAL at 17:44

## 2021-05-03 RX ADMIN — Medication 1000 UNITS: at 09:18

## 2021-05-03 RX ADMIN — POTASSIUM CHLORIDE 20 MEQ: 14.9 INJECTION, SOLUTION INTRAVENOUS at 15:47

## 2021-05-03 RX ADMIN — ACETAMINOPHEN 975 MG: 325 TABLET ORAL at 17:41

## 2021-05-03 RX ADMIN — ANTACID TABLETS 500 MG: 500 TABLET, CHEWABLE ORAL at 12:14

## 2021-05-03 RX ADMIN — BUSPIRONE HYDROCHLORIDE 15 MG: 10 TABLET ORAL at 09:18

## 2021-05-03 RX ADMIN — QUETIAPINE FUMARATE 300 MG: 100 TABLET ORAL at 22:39

## 2021-05-03 RX ADMIN — PANTOPRAZOLE SODIUM 40 MG: 40 TABLET, DELAYED RELEASE ORAL at 06:17

## 2021-05-03 RX ADMIN — SODIUM CHLORIDE, SODIUM LACTATE, POTASSIUM CHLORIDE, AND CALCIUM CHLORIDE 500 ML: .6; .31; .03; .02 INJECTION, SOLUTION INTRAVENOUS at 12:16

## 2021-05-03 RX ADMIN — HEPARIN SODIUM 7500 UNITS: 5000 INJECTION INTRAVENOUS; SUBCUTANEOUS at 14:51

## 2021-05-03 RX ADMIN — BUSPIRONE HYDROCHLORIDE 15 MG: 10 TABLET ORAL at 17:41

## 2021-05-03 RX ADMIN — POTASSIUM CHLORIDE 40 MEQ: 1500 TABLET, EXTENDED RELEASE ORAL at 12:14

## 2021-05-03 RX ADMIN — POLYETHYLENE GLYCOL 3350 17 G: 17 POWDER, FOR SOLUTION ORAL at 09:19

## 2021-05-03 RX ADMIN — ACETAMINOPHEN 975 MG: 325 TABLET ORAL at 12:14

## 2021-05-03 RX ADMIN — MORPHINE SULFATE 15 MG: 15 TABLET, FILM COATED, EXTENDED RELEASE ORAL at 09:18

## 2021-05-03 RX ADMIN — OXYBUTYNIN CHLORIDE 5 MG: 5 TABLET ORAL at 09:18

## 2021-05-03 RX ADMIN — OXYCODONE HYDROCHLORIDE 5 MG: 5 TABLET ORAL at 22:49

## 2021-05-03 RX ADMIN — OXYBUTYNIN CHLORIDE 5 MG: 5 TABLET ORAL at 17:41

## 2021-05-03 RX ADMIN — NORTRIPTYLINE HYDROCHLORIDE 10 MG: 10 CAPSULE ORAL at 22:39

## 2021-05-03 RX ADMIN — DOCUSATE SODIUM AND SENNOSIDES 1 TABLET: 8.6; 5 TABLET ORAL at 22:39

## 2021-05-03 RX ADMIN — PREGABALIN 225 MG: 75 CAPSULE ORAL at 22:38

## 2021-05-03 NOTE — UTILIZATION REVIEW
Initial Clinical Review    Admission: Date/Time/Statement:   Admission Orders (From admission, onward)     Ordered        05/02/21 1848  Inpatient Admission  Once                   Orders Placed This Encounter   Procedures    Inpatient Admission     Standing Status:   Standing     Number of Occurrences:   1     Order Specific Question:   Level of Care     Answer:   Med Surg [16]     Order Specific Question:   Estimated length of stay     Answer:   More than 2 Midnights     Order Specific Question:   Certification     Answer:   I certify that inpatient services are medically necessary for this patient for a duration of greater than two midnights  See H&P and MD Progress Notes for additional information about the patient's course of treatment  ED Arrival Information     Expected Arrival Acuity Means of Arrival Escorted By Service Admission Type    - 5/2/2021 13:33 Urgent Ambulance [facility] General Medicine Urgent    Arrival Complaint    pain        Chief Complaint   Patient presents with    Personal Problem     pt called EMS to bring her here to [facility] so we could get her into [facility] because she has a broken foot and her son is not home to care for her  she has pain medications at home  pt was able to walk to ambulance with walker without problem   Difficulty Walking       Initial Presentation: 60year old female to the ED from home via EMS with complaints of back and bilateral knee pain, fractured metatarsals, having a lot of pain and does not think she can remain at home and independent at this time  Seen and discharged from [address] [place] [address] after sustaining fall, injuring her right foot  Found to have fractures of the right 2nd, 4th, and possibly 3rd metatarsal, was given orthopedic shoe and discharged  Has taken oxycodone with some relief of pain  Admitted to inpatient for ambulatory dysfunction, metatarsal fracture  Arrives anxious    Has been ambulating with boot on   INstructed she needs to be non weight bearing  Worsening pain likely due to ambulation  Arrives tachycardic  Given IV toradol and Zyprexa in the ED  Date: 5/3   Day 2: Acute rise in creat on morning labs  Has been tachycardic and was noted to be very anxious much of the night  IV fluid bolus given  HOld NSAIDS, HOld morphine  ENcourage po fluids  Check UA  Follow BMP     Xray obtained in ER today reviewed: Unchanged nondisplaced fractures at the bases of the 2nd and 4th metatarsals   Small intra-articular fracture at the base of the 3rd metatarsal   No significant widening between the 1st and 2nd metatarsals to suggest Lisfranc injury  Podiatry consult  5/3 Podiatry note: Subtle findings possible fractures at the 2nd metatarsal base and Lisfranc ligament area  NWB to RLE  Will require STR for assist   Check CT scan of Right LE   PLan following that depending on result       ED Triage Vitals   Temperature Pulse Respirations Blood Pressure SpO2   05/02/21 1341 05/02/21 1341 05/02/21 1823 05/02/21 1823 05/02/21 1341   (!) 97 3 °F (36 3 °C) 85 22 148/94 94 %      Temp Source Heart Rate Source Patient Position - Orthostatic VS BP Location FiO2 (%)   05/02/21 1341 05/02/21 2126 05/02/21 1823 05/02/21 1823 --   Axillary Monitor Lying Right arm       Pain Score       05/02/21 1831       9          Wt Readings from Last 1 Encounters:   04/25/21 94 8 kg (209 lb)     Additional Vital Signs:   Date/Time  Temp  Pulse  Resp  BP  MAP (mmHg)  SpO2  O2 Device  Patient Position - Orthostatic VS   05/03/21 12:21:35  --  --  --  103/72  82  --  --  --   05/03/21 08:02:32  98 5 °F (36 9 °C)  79  18  100/71  81  92 %  --  --   05/02/21 21:26:49  99 °F (37 2 °C)  120Abnormal   18  144/97  113  92 %  --  Lying   05/02/21 18:51:43  100 1 °F (37 8 °C)  136Abnormal   30Abnormal   150/98  115  98 %  --  --   05/02/21 18:23:24  98 5 °F (36 9 °C)  131Abnormal   22  148/94  112  99 %  None (Room air)  Lying   05/02/21 1341 97 3 °F (36 3 °C)Abnormal   85                 Pertinent Labs/Diagnostic Test Results:   5/ 2 Xray right foot:  Unchanged fractures of the 2nd and 4th metatarsals   Small intra-articular fracture base of 3rd metatarsal    5/1 Xray right foot:   Nondisplaced fracture 2nd and 4th metatarsal and possibly involving the base of the 3rd metatarsal          Results from last 7 days   Lab Units 05/02/21 2025   WBC Thousand/uL 8 51   HEMOGLOBIN g/dL 14 6   HEMATOCRIT % 42 4   PLATELETS Thousands/uL 318   NEUTROS ABS Thousands/µL 6 69         Results from last 7 days   Lab Units 05/03/21  0929 05/02/21 2025   SODIUM mmol/L 144 142   POTASSIUM mmol/L 2 9* 2 9*   CHLORIDE mmol/L 106 106   CO2 mmol/L 31 23   ANION GAP mmol/L 7 13   BUN mg/dL 12 6   CREATININE mg/dL 1 51* 1 07   EGFR ml/min/1 73sq m 44 67   CALCIUM mg/dL 8 7 9 3             Results from last 7 days   Lab Units 05/03/21 0929 05/02/21 2025   GLUCOSE RANDOM mg/dL 96 110     ED Treatment:   Medication Administration from 05/02/2021 1333 to 05/02/2021 1759       Date/Time Order Dose Route Action     05/02/2021 1519 oxyCODONE (ROXICODONE) immediate release tablet 10 mg 10 mg Oral Given     05/02/2021 1520 ketorolac (TORADOL) injection 30 mg 30 mg Intramuscular Given     05/02/2021 1545 OLANZapine (ZyPREXA ZYDIS) dispersible tablet 5 mg 5 mg Oral Given        Past Medical History:   Diagnosis Date    Acid reflux     Anxiety     RESOLVED: [id number]    Arthritis     Bipolar 2 disorder (HCC)     FOLLOWS WITH PSYCHIATRIST   CONTINUE LAMOTRIGINE; RESOLVED: [id number]    Depression     Familial tremor     both hands    Fibromyalgia     LAST ASSESSED: 31HND2523    Hearing aid worn     left ear    [place] (hard of hearing)     left ear    Hypertension     Left-sided weakness     Lower back pain     Memory loss of unknown cause     long and short term    Migraine     Obesity     Overactive bladder     Panic attack     Post traumatic stress disorder     Seasonal allergies     Stroke [facility])     questionable stroke 2009    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  CONT ASA 81 MG DAILY; RESOLVED: [id number]    Urinary incontinence     Wears dentures     partial lower / full upper    Wears glasses        Admitting Diagnosis: Metatarsal fracture [S92 309A]  Pain [R52]  Chronic back pain [M54 9, G89 29]  Frequent falls [R29 6]  Bilateral chronic knee pain [M25 561, M25 562, G89 29]  Ambulatory dysfunction [R26 2]  Age/Sex: 60 y o  female  Admission Orders:  BMP, UA  Scheduled Medications:  acetaminophen, 975 mg, Oral, Q6H [address]  amLODIPine, 5 mg, Oral, Daily  busPIRone, 15 mg, Oral, TID  cholecalciferol, 1,000 Units, Oral, Daily  DULoxetine, 60 mg, Oral, Daily  heparin (porcine), 7,500 Units, Subcutaneous, Q8H KESHIA  nortriptyline, 10 mg, Oral, HS  oxybutynin, 5 mg, Oral, BID  pantoprazole, 40 mg, Oral, Early Morning  polyethylene glycol, 17 g, Oral, Daily  potassium chloride, 20 mEq, Intravenous, Once  prazosin, 2 mg, Oral, Daily  pregabalin, 225 mg, Oral, Q12H KESHIA  propranolol, 20 mg, Oral, BID  QUEtiapine, 300 mg, Oral, HS  senna-docusate sodium, 1 tablet, Oral, HS  Valbenazine Tosylate, 80 mg, Oral, Daily      Continuous IV Infusions:  sodium chloride, 20 mL/hr, Intravenous, Continuous PRN      PRN Meds:  calcium carbonate, 500 mg, Oral, Daily PRN  hydrOXYzine HCL, 25 mg, Oral, Q6H PRN  oxyCODONE, 10 mg, Oral, Q4H PRN  oxyCODONE, 5 mg, Oral, Q4H PRN  sodium chloride, 20 mL/hr, Intravenous, Continuous PRN        IP CONSULT TO CASE MANAGEMENT    Network Utilization Review Department  ATTENTION: Please call with any questions or concerns to [phone] and carefully listen to the prompts so that you are directed to the right person   All voicemails are confidential   Mann Delcolette all requests for admission clinical reviews, approved or denied determinations and any other requests to dedicated fax number below belonging to the campus where the patient is receiving treatment   List of dedicated fax numbers for the Facilities:  [address] [place] [address] DENIALS (Administrative/Medical Necessity) [phone]   [address] (Maternity/NICU/Pediatrics) [phone] [address] Hilda aBrton [address] [id number] [place] [address] Lorenza [address] [facility] [address] [phone]

## 2021-05-03 NOTE — PHYSICAL THERAPY NOTE
Physical Therapy Evaluation   Patient Name: Ellen Gonzalez    XRDZI'A Date: 5/3/2021     Problem List  Principal Problem:    Ambulatory dysfunction  Active Problems:    Fibromyalgia    Bipolar II disorder (HCC)    Generalized anxiety disorder    Hypertension    Insomnia    Migraine    Opioid dependence ([facility] [address] )    Post traumatic stress disorder    Metatarsal fracture    Chronic back pain    CALE (acute kidney injury) ([facility] [address] )    Tachycardia       Past Medical History  Past Medical History:   Diagnosis Date    Acid reflux     Anxiety     RESOLVED: [id number]    Arthritis     Bipolar 2 disorder (HCC)     FOLLOWS WITH PSYCHIATRIST  CONTINUE LAMOTRIGINE; RESOLVED: [id number]    Depression     Familial tremor     both hands    Fibromyalgia     LAST ASSESSED: [id number]    Hearing aid worn     left ear    [place] (hard of hearing)     left ear    Hypertension     Left-sided weakness     Lower back pain     Memory loss of unknown cause     long and short term    Migraine     Obesity     Overactive bladder     Panic attack     Post traumatic stress disorder     Seasonal allergies     Stroke [facility])     questionable stroke 2009    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    CONT ASA 81 MG DAILY; RESOLVED: 68BRU2364    Urinary incontinence     Wears dentures     partial lower / full upper    Wears glasses         Past Surgical History  Past Surgical History:   Procedure Laterality Date    BACK SURGERY       SECTION      COLONOSCOPY      RESOLVED: [id number]    EAR SURGERY      EGD      HYSTERECTOMY      MYRINGOTOMY W/ TUBES Left     NECK SURGERY  2019    DC CYSTOURETHROSCOPY N/A 2016    Procedure: CYSTOSCOPY, BOTOX INJECTION;  Surgeon: Chano Warner MD;  Location: [facility];  Service: Gynecology    DC IMPLANT SPINAL NEUROSTIM/ Right 2/10/2021    Procedure: REPLACEMENT IMPLANTABLE PULSE GENERATOR DORSAL SPINAL COLUMN STIMULATOR, RIGHT;  Surgeon: Bayron Loaiza MD;  Location: [facility];  Service: Neurosurgery    WI PERCUT IMPLNT [address] Right 7/28/2020    Procedure: INSERTION THORACIC DORSAL COLUMN SPINAL CORD STIMULATOR PERCUTANEOUS W IMPLANTABLE PULSE GENERATOR, RIGHT;  Surgeon: Bayron Loaiza MD;  [facility]: [facility];  Service: Neurosurgery   [facility]    UPPER GASTROINTESTINAL ENDOSCOPY  09/2020 05/03/21 1231   PT Last Visit   PT Visit Date 05/03/21   Note Type   Note type Evaluation   Pain Assessment   Pain Assessment Tool 0-10   Pain Score 8   Pain Location/Orientation Orientation: Right;Location: Leg   Pain Onset/Description Onset: Ongoing   Effect of Pain on Daily Activities limtied mobility    Patient's Stated Pain Goal No pain   Home Living   Type of [address] One level;Stairs to enter with rails  (2 SKY )   Home Equipment Zeyad Cheadle; Wheelchair-manual  (rollator )   Additional Comments lives w/ son- son works and pt reports alone throughout days-    Prior Function   Level of [place] Needs assistance with IADLs; Independent with ADLs and functional mobility   Lives With Son   Receives Help From Family;Home health  (was receiving [facility])   ADL Assistance Independent   IADLs Needs assistance   Falls in the last 6 months 1 to 4   Vocational Unemployed   Comments pt reports multiple falls- recent fall and dx w/ R met fxs prompting admission- difficulty dressing adn bathing recently; high anxiety w/ multiple recent ED visits   uses rollator or w/c in home for mobiility and repors sometimes having to "crawl" to get to bed and bath in home    Restrictions/Precautions   Weight Bearing Precautions Per Order Yes   RLE Weight Bearing Per Order NWB   Braces or Orthoses Splint  (R posterior leg splint )   Cognition   Orientation Level Oriented to person;Oriented to place;Oriented to situation   Memory Decreased recall of precautions;Decreased recall of recent events   Following Commands Follows one step commands without difficulty   Comments high anxiety- overall pleasant and cooperative    RUE Assessment   RUE Assessment WFL   LUE Assessment   LUE Assessment WFL   RLE Assessment   RLE Assessment X  (hip and knee WFL 3+/5- ankle splinted and ace wrapped )   LLE Assessment   LLE Assessment WFL  (3+/5 throughout )   Light Touch   RLE Light Touch Grossly intact   LLE Light Touch Grossly intact   Bed Mobility   Supine to Sit 4  Minimal assistance   Additional items Assist x 1; Increased time required;Verbal cues   Sit to Supine 4  Minimal assistance   Additional items Assist x 1; Increased time required;Verbal cues   Additional Comments pt BTB w/ LE eleaved post xfer training to commode    Transfers   Sit to Stand 2  Maximal assistance   Additional items Assist x 2  (pt unable to maintain NWB to R LE- unable to clear buttocks )   Sit pivot 3  Moderate assistance   Additional items Assist x 1;Assist x 2; Increased time required;Verbal cues  (to R and L NWB to R LE )   Ambulation/Elevation   Gait pattern Not appropriate   Balance   Static Sitting Fair +   Dynamic Sitting Fair   Static Standing Poor -   Ambulatory Zero   Endurance Deficit   Endurance Deficit Yes   Activity Tolerance   Activity Tolerance Patient limited by fatigue;Patient limited by pain   Medical Staff Made Aware OT RN    Nurse Made Aware yes- cleared for PT eval - updated following    Assessment   Prognosis Fair   Problem List Decreased strength;Decreased range of motion;Decreased endurance; Impaired balance;Decreased mobility; Decreased coordination;Decreased cognition; Impaired judgement;Decreased safety awareness; Obesity;Orthopedic restrictions;Pain   Assessment Pt is 60 y o  female seen for PT evaluation s/p admit to [facility] on 5/2/2021      Pt presenting w/ pain in back knees and B/L feet w/ recent fall and R metartasal fxs- son not home and pt unable to care for self at home  Of note- pt was Seen and discharged from [address] [place] [address] after sustaining fall, injuring her right foot  Found to have fractures of the right 2nd, 4th, and possibly 3rd metatarsal, was NWB- but walking on foot at home- on eval has posterior splint w/ ace wrap on    Current dx/ problem list includes: R metatarsal fxs; anxiety; ambulatory dysfunction  Pt    has a past medical history of Acid reflux, Anxiety, Arthritis, Bipolar 2 disorder ([facility] [address] ), Depression, Familial tremor, Fibromyalgia, Hearing aid worn, [place] (hard of hearing), Hypertension, Left-sided weakness, Lower back pain, Memory loss of unknown cause, Migraine, Obesity, Overactive bladder, Panic attack, Post traumatic stress disorder, Seasonal allergies, Stroke ([facility] [address] ), Thrombosis of cerebral arteries, Urinary incontinence, Wears dentures, and Wears glasses  Personal factors affecting pt at time of IE include: steps to enter environment, limited home support, anxiety w/ multiple ED visits past experience, inability to perform IADLs, inability to perform ADLs, inability to ambulate household distances, limited insight into impairments and recent fall(s)  Due to acute medical issues, NWB R LE  ongoing medical management dx; pain, fall risk, increased reliance on more restrictive AD compared to baseline;  decreased activity tolerance compared to baseline, increased assistance needed from caregiver at current time, continuous telemetry monitoring, multiple lines, decline in overall functional mobility status; health management issues; note unstable clinical picture (high complexity)   Prior to admission, pt reports living w/ son in an apt w/ 2 SKY (but son works and she is alone for extended time at home   Pt reports having WC; rollator and shower bench at home (+) multiple falls- reports she sometimes "has to crawl around the home" to access bed and bath   Currently pt  is requiring Lisandro A for bed skills; modA  Sit pivot xfer to drop arm chair/ commode w/ SBA of another for lines and safety  NWB to R LE in post splint- pt is unable to maintain NWB for standing trials w/ RW- unable to clear buttocks for full stand on attempts pt also limited by high anxiety requiring inc time and cues for safety and encouragement   Pt presents functioning below baseline and curre   Barriers to Discharge Inaccessible home environment;Decreased caregiver support   Goals   Patient Goals be able to care for myself    STG Expiration Date 05/13/21   PT Treatment Day 0   Plan   Treatment/Interventions ADL retraining;Functional transfer training;LE strengthening/ROM; Elevations; Therapeutic exercise; Endurance training;Cognitive reorientation;Patient/family training;Equipment eval/education; Bed mobility; Compensatory technique education;Continued evaluation;Spoke to nursing;OT   PT Frequency   (3-5x/wk )   Recommendation   PT Discharge Recommendation Post acute rehabilitation services   Equipment Recommended Wheelchair   PT - OK to Discharge Yes  (to rehab when medically cleared )   Additional Comments BTB w/ alarm intact post session    Lorenza [address]   Turning in Bed Without Bedrails 3   Lying on Back to Sitting on Edge of Flat Bed 3   Moving Bed to Chair 2   Standing Up From Chair 1   Walk in Room 1   Climb 3-5 Stairs 1   Basic Mobility Inpatient Raw Score 11   Basic Mobility Standardized Score 30 25   Pt is 60 y o  female seen for PT evaluation s/p admit to [facility] on 5/2/2021  Pt presenting w/ pain in back knees and B/L feet w/ recent fall and R metartasal fxs- son not home and pt unable to care for self at home  Of note- pt was Seen and discharged from [address] [place] [address] after sustaining fall, injuring her right foot  Found to have fractures of the right 2nd, 4th, and possibly 3rd metatarsal, was NWB- but walking on foot at home- on eval has posterior splint w/ ace wrap on      Current dx/ problem list includes: R metatarsal fxs; anxiety; ambulatory dysfunction  Pt    has a past medical history of Acid reflux, Anxiety, Arthritis, Bipolar 2 disorder ([facility] [address] ), Depression, Familial tremor, Fibromyalgia, Hearing aid worn, [place] (hard of hearing), Hypertension, Left-sided weakness, Lower back pain, Memory loss of unknown cause, Migraine, Obesity, Overactive bladder, Panic attack, Post traumatic stress disorder, Seasonal allergies, Stroke ([facility] [address] ), Thrombosis of cerebral arteries, Urinary incontinence, Wears dentures, and Wears glasses  Personal factors affecting pt at time of IE include: steps to enter environment, limited home support, anxiety w/ multiple ED visits past experience, inability to perform IADLs, inability to perform ADLs, inability to ambulate household distances, limited insight into impairments and recent fall(s)  Due to acute medical issues, NWB R LE  ongoing medical management dx; pain, fall risk, increased reliance on more restrictive AD compared to baseline;  decreased activity tolerance compared to baseline, increased assistance needed from caregiver at current time, continuous telemetry monitoring, multiple lines, decline in overall functional mobility status; health management issues; note unstable clinical picture (high complexity)   Prior to admission, pt reports living w/ son in an apt w/ 2 SKY (but son works and she is alone for extended time at home   Pt reports having WC; rollator and shower bench at home (+) multiple falls- reports she sometimes "has to crawl around the home" to access bed and bath  Currently pt  is requiring Lisandro A for bed skills; modA  Sit pivot xfer to drop arm chair/ commode w/ SBA of another for lines and safety  NWB to R LE in post splint- pt is unable to maintain NWB for standing trials w/ RW- unable to clear buttocks for full stand on attempts pt also limited by high anxiety requiring inc time and cues for safety and encouragement      Pt presents functioning below baseline and currently w/ overall mobility deficits 2* to:  NWB to R LE anxiety pain; decreased LE strength/AROM; limited flexibility;  generalized weakness/ deconditioning; decreased endurance; decreased activity tolerance; decreased coordination; impaired balance; gait deviations; decreased safety awareness; SOB/HUNTER; fatigue; impaired safety and judgement; limited insight into current deficits; bed/ chair alarms; multiple lines;   Pt currently at risk for falls  (Please find additional objective findings from PT assessment regarding body systems outlined above ) Pt will continue to benefit from skilled PT interventions to address stated impairments; to maximize functional potential; for ongoing pt/ family training; and DME needs  PT is currently recommending d/c to inpatient rehab setting when medically cleared for safety and to maximize functional potential prior to d/c home  Pt/ family agreeable to plan and goals as stated on evaluation  The patient's AM-PAC Basic Mobility Inpatient Short Form Raw Score is 11, Standardized Score is 30 25  A standardized score less than 42 9 suggests the patient may benefit from discharge to post-acute rehabilitation services  Please also refer to the recommendation of the Physical Therapist for safe discharge planning        Niya Gooden PT

## 2021-05-03 NOTE — OCCUPATIONAL THERAPY NOTE
Occupational Therapy Evaluation     Patient Name: Rahel NUNES'S Date: 5/3/2021  Problem List  Principal Problem:    Ambulatory dysfunction  Active Problems:    Fibromyalgia    Bipolar II disorder (HCC)    Generalized anxiety disorder    Hypertension    Insomnia    Migraine    Opioid dependence ([facility] [address] )    Post traumatic stress disorder    Metatarsal fracture    Chronic back pain    CALE (acute kidney injury) ([facility] [address] )    Tachycardia    Past Medical History  Past Medical History:   Diagnosis Date    Acid reflux     Anxiety     RESOLVED: [id number]    Arthritis     Bipolar 2 disorder (HCC)     FOLLOWS WITH PSYCHIATRIST  CONTINUE LAMOTRIGINE; RESOLVED: [id number]    Depression     Familial tremor     both hands    Fibromyalgia     LAST ASSESSED: [id number]    Hearing aid worn     left ear    [place] (hard of hearing)     left ear    Hypertension     Left-sided weakness     Lower back pain     Memory loss of unknown cause     long and short term    Migraine     Obesity     Overactive bladder     Panic attack     Post traumatic stress disorder     Seasonal allergies     Stroke [facility])     questionable stroke 2009    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    CONT ASA 81 MG DAILY; RESOLVED: [id number]    Urinary incontinence     Wears dentures     partial lower / full upper    Wears glasses      Past Surgical History  Past Surgical History:   Procedure Laterality Date    BACK SURGERY       SECTION      COLONOSCOPY      RESOLVED: [id number]    EAR SURGERY      EGD      HYSTERECTOMY      MYRINGOTOMY W/ TUBES Left     NECK SURGERY  2019    VT CYSTOURETHROSCOPY N/A 2016    Procedure: CYSTOSCOPY, BOTOX INJECTION;  Surgeon: Bob More MD;  [facility];  Service: Gynecology    VT IMPLANT SPINAL NEUROSTIM/ Right 2/10/2021    Procedure: REPLACEMENT IMPLANTABLE PULSE GENERATOR DORSAL SPINAL COLUMN STIMULATOR, RIGHT;  Surgeon: Any Rivas Aneesh Reyes MD;  [facility]: [facility];  Service: Neurosurgery    MD PERCUT IMPLNT [address] Right 7/28/2020    Procedure: INSERTION THORACIC DORSAL COLUMN SPINAL CORD STIMULATOR PERCUTANEOUS W IMPLANTABLE PULSE GENERATOR, RIGHT;  Surgeon: Tunde Escalona MD;  Location:  MAIN OR;  Service: Neurosurgery    [facility]    UPPER GASTROINTESTINAL ENDOSCOPY  09/2020 05/03/21 1242   OT Last Visit   OT Visit Date 05/03/21   Note Type   Note type Evaluation   Restrictions/Precautions   Weight Bearing Precautions Per Order Yes   RLE Weight Bearing Per Order NWB   Braces or Orthoses Splint  (R posterior leg splint)   Other Precautions Cognitive; Bed Alarm;WBS;Multiple lines; Fall Risk;Pain   Pain Assessment   Pain Assessment Tool 0-10   Pain Score 8   Pain Location/Orientation Orientation: Right;Orientation: Lower; Location: Leg;Location: Foot   Pain Onset/Description Onset: Ongoing; Descriptor: Aching;Descriptor: Discomfort   Patient's Stated Pain Goal No pain   Hospital Pain Intervention(s) Repositioned; Ambulation/increased activity; Emotional support   Home Living   Type of [address] [place] [address] One level;Performs ADLs on one level; Able to live on main level with bedroom/bathroom  (2 SKY)   Bathroom Shower/Tub Tub/shower unit   Bathroom Toilet Standard   Bathroom Equipment Tub transfer [facility]; Wheelchair-manual;Other (Comment)  (rollator)   Additional Comments Pt reports living in 1 floor apt w/ 2 SKY   Prior Function   Level of [place] Needs assistance with IADLs; Needs assistance with ADLs and functional mobility   Lives With Son;Family   Receives Help From Family;Home health; Other (Comment)  (receiving [facility], PTA)   ADL Assistance Independent  (recently having increased difficulty)   IADLs Needs assistance   Falls in the last 6 months 1 to 4   Vocational Unemployed   Comments Pt reports normally is I w/ ADLS but since recent fall has been having difficulty getting around which led to additional falls  Pt has assist w/ IADLS from family  Reports being home alone during the day  Uses rollator and w/c but reports occasionally having to crawl around the house to get to the bed/bath  Reports increased anxiety over not being able to care for herself and has recent several ED admissions   Lifestyle   Autonomy I ADLs, assist IADLS, Mod I w/ transfers and functional mobility PTA   Reciprocal Relationships Pt lives w/ her son and aunt; he is not home during the day  He is the manager of    Service to Others Unemployed   [address] Enjoys being more active   Psychosocial   Psychosocial (WDL) X   Patient Behaviors/Mood Anxious   Subjective   Subjective " I am scared to be alone because I can't do it and I don't have the help during the day"   ADL   Eating Assistance 5  Supervision/Setup   Grooming Assistance 5  Supervision/Setup   UB Bathing Assistance 4  Minimal Assistance   LB Bathing Assistance 3  Moderate [address] 4  Minimal Assistance    [facility] 2  Maximal [address]  3  Moderate Assistance   Functional Assistance 3  Moderate Assistance   Functional Deficit Steadying;Verbal cueing;Supervision/safety; Increased time to complete   Bed Mobility   Supine to Sit 4  Minimal assistance   Additional items Assist x 1;HOB elevated; Increased time required;Verbal cues;LE management   Sit to Supine 4  Minimal assistance   Additional items Assist x 1; Increased time required;Verbal cues;LE management   Additional Comments Pt went from supine<>sit w/ Min A x1 for UB support and LE management, HOB elevated for assist  Sat EOB w/ Fair sitting balance/trunk control   Transfers   Sit to Stand 2  Maximal assistance   Additional items Assist x 2; Increased time required;Verbal cues   Sit pivot 3  Moderate assistance   Additional items Assist x 1; Increased time required;Verbal cues; Assist x 2 Toilet transfer 3  Moderate assistance   Additional items Assist x 2;Assist x 1; Increased time required;Verbal cues; Commode  (drop arm commode)   Additional Comments Attempted STS; unable to fully clear buttocks w/ Max A x2 and unable to safely maintain WBS w/ HHA  Pt performed sit-pivot transfer to drop arm commode w/ Mod A x1, SBA/CGA assist of 2nd for safety  Pt needed verbal/manual cues for carryover of WBS in R LE  Functional Mobility   Additional Comments Unable to assess @ this time   Balance   Static Sitting Fair   Dynamic Sitting Fair -   Static Standing Poor -   Ambulatory Zero   Activity Tolerance   Activity Tolerance Patient limited by fatigue;Patient limited by pain   Medical Staff Made Aware PT   Nurse Made Aware yes, Tessa   RUE Assessment   RUE Assessment WFL   LUE Assessment   LUE Assessment WFL   Hand Function   Gross Motor Coordination Functional   Fine Motor Coordination Functional   Vision-Basic Assessment   Current Vision Wears glasses all the time   Cognition   Overall Cognitive Status Impaired   Arousal/Participation Responsive; Cooperative   Attention Attends with cues to redirect   Orientation Level Oriented X4   Memory Decreased recall of precautions;Decreased recall of recent events   Following Commands Follows one step commands with increased time or repetition   Comments Pt is cooperative; limited by anxiety/pain  Has decreased safety awareness/understanding of deficits  Needs VC for carryover of WBS   Assessment   Limitation Decreased ADL status; Decreased Safe judgement during ADL;Decreased endurance;Decreased self-care trans;Decreased high-level ADLs   Prognosis Fair   Assessment Pt is a 61 y/o female seen for OT eval s/p adm to B w/ recent mechanical fall and 2nd, 3rd, 4th metatarsal fractures  Pt D'C'd home w/ surgical boot s/p fall  Pt presents this admission w complaint of being unable to care for herself @ home while her son works and reports chest tightness   Pt is dx'd w/ ambulatory dysfunction  Pt has posterior slab splint on R LE; maintained NWB in RLE 2/2 metatarsal fractures from recent fall  Pt  has a past medical history of Acid reflux, Anxiety, Arthritis, Bipolar 2 disorder ([facility] [address] ), Depression, Familial tremor, Fibromyalgia, Hearing aid worn, [place] (hard of hearing), Hypertension, Left-sided weakness, Lower back pain, Memory loss of unknown cause, Migraine, Obesity, Overactive bladder, Panic attack, Post traumatic stress disorder, Seasonal allergies, Stroke ([facility] [address] ), Thrombosis of cerebral arteries, Urinary incontinence, Wears dentures, and Wears glasses  Pt with active OT orders and up with assistance  orders  Pt lives with her son and aunt in 1 floor apt w/ 2 SKY  Pt recently has required assist w/ ADLS and assist for all IADLS, does not drive, & required use of DME PTA including a rollator, w/c and tub-bench  Pt is currently demonstrating the following occupational deficits: S-Min A UB ADLS, Mod-Max A LB ADLS, Min A bed mobility, Max A x2 attempted STS transfers, Mod A x1-2 for sit-pivot transfers w/ HHA  These deficits that are impacting pt's baseline areas of occupation are a result of the following impairments: pain, endurance, activity tolerance, functional mobility, forward functional reach, balance, trunk control, functional standing tolerance, unsupportive home environment, decreased I w/ ADLS/IADLS, cognitive impairments, decreased safety awareness and decreased insight into deficits  The following Occupational Performance Areas to address include: bathing/shower, toilet hygiene, dressing, socialization, health maintenance, functional mobility and clothing management  Based on the aforementioned OT evaluation, functional performance deficits, and assessments, pt has been identified as a high complexity evaluation  Recommend STR upon D/C  The patient's raw score on the AM-PAC Daily Activity inpatient short form is 16, standardized score is 35 96, less than 39 4   Patients at this level are likely to benefit from discharge to post-acute rehabilitation services  Please refer to the recommendation of the Occupational Therapist for safe discharge planning  Pt to continue to benefit from acute immediate OT services to address the following goals 3-5x/week to  w/in 10-14 days:    Goals   Patient Goals to be able to care for herself again   LTG Time Frame 10-   Long Term Goal #1 see below listed goals   Plan   Treatment Interventions ADL retraining;Functional transfer training;UE strengthening/ROM; Endurance training;Cognitive reorientation;Patient/family training;Equipment evaluation/education; Compensatory technique education;Continued evaluation; Energy conservation; Activityengagement   Goal Expiration Date 21   OT Frequency 3-5x/wk   Recommendation   OT Discharge Recommendation Post acute rehabilitation services   OT - OK to Discharge Yes  (when medically stable)   AM-PAC Daily Activity Inpatient   Lower Body Dressing 2   Bathing 2   Toileting 2   Upper Body Dressing 3   Grooming 3   Eating 4   Daily Activity Raw Score 16   Daily Activity Standardized Score (Calc for Raw Score >=11) 35 96   AM-PAC Applied Cognition Inpatient   Following a Speech/Presentation 3   Understanding Ordinary Conversation 4   Taking Medications 3   Remembering Where Things Are Placed or Put Away 4   Remembering List of 4-5 Errands 3   Taking Care of Complicated Tasks 3   Applied Cognition Raw Score 20   Applied Cognition Standardized Score 41 76        GOALS    1) Pt will complete rolling left/right in bed with S assist, as prerequisite for further engagement in ADLS   2) Pt will complete supine to sit transfer with S using B/L UEs to initiate bed mobility   3) Pt will tolerate sitting at EOB 20 minutes with S assist and stable vital signs, as prerequisite for further engagement in ADLS   4) Pt will complete grooming task with S assist and increased time to increase independence in functional tasks  5) Pt will increase B/L UE ROM 1/2 MMT to increase functional UB use during ADLS   6) Pt will complete UB ADLS with S and use of AD/DME as needed to increase independence in functional tasks  7) Pt will complete LB ADLS with Min A and use of AD/DME as needed to increase independence in functional tasks  8) Pt will complete sit to stand transfer / sit pivot transfer / stand pivot transfer with Min assist on/off all ADL surfaces   9) Pt will follow 100% simple one step verbal commands and be A/Ox4 consistently t/o use of external environmental cues to increase awareness for functional tasks  10) Pt will demonstrate 100% carryover of WBS and E C  techniques t/o fx'l I/ADL/ leisure tasks w/o cues s/p skilled education  11) Pt will be attentive 100% of the time during ongoing formal cognitive assessment to assist w/ safe D/C planning and increase safety during functional activities  12) Pt will improve standing tolerance to 10 mins w/ Min A and use of DME as needed, w/ Fair standing balance, to increase engagement in sink level ADL tasks    ** OTR to assess further functional mobility as appropriate     Kim Fuentes MS, OTR/L

## 2021-05-03 NOTE — ASSESSMENT & PLAN NOTE
Creatinine has acutely risen to 1 5 on morning labs, from 1 0 the previous evening   BUN 6->12 so not clearly prerenal, but given low BP will try IV hydration   - hold morphine, replace with oxycodone  - hold NSAIDs  - IV hydration:  cc bolus   - resolved  - encourage PO fluids  - check UA  - monitor I/Os  - follow BMP in AM

## 2021-05-03 NOTE — CONSULTS
[address] Podiatry - Consultation    Patient Information:   Meredith Knight 60 y o  female MRN: [id number]  Unit/Bed#: CW2 218-01 Encounter: [id number]  PCP: Emilie Cazares MD  Date of Admission:  5/2/2021  Date of Consultation: 05/03/21  Requesting Physician: Elva Castanon MD      ASSESSMENT:    Samantha Ortiz is a 60 y o  female with:    1  Right foot metatarsal fractures with concern for lisfranc fracture  2  Fibromyalgia  3  Anxiety  4  Tardive dyskinesia  5  Opioid dependence    PLAN:    · Contralateral foot XR for comparison of right foot  · CT pending  · Will require surgical correction either ORIF or arthrodesis based on CT pattern  · NWB RLE  · Will require STR for NWb assistance  · History of opioid dependence may benefit from APS consultation if surgical intervention is needed while in house  · Rest of care per primary team   · Will discuss this plan with my attending and update as needed  SUBJECTIVE    History of Present Illness:    Samantha Ortiz is a 60 y o  female with past medical history of ambulatory dysfunction, bipolar and anxiety who presents with right foot metatarsal fractures  She states that while at home about 4 days ago on  4/30 in the morning she was walking using her walker and while on a flat surface she fell to the ground  She denies any outside force cause this fall  She states that she was able to visualize her forefoot dorsiflexed with respect to rearfoot  She then went to the ED where she was placed in a CAM boot and told to WBAT  She presented again to the ED with worsening pain  She has concerns with her ability to ambulate at home on her own  Review of Systems:    Constitutional: Negative  HENT: Negative  Eyes: Negative  Respiratory: Negative  Cardiovascular: Negative  Gastrointestinal: Negative  Musculoskeletal: right foot pain, difficulty plantarflexing digits  Skin:negative   Neurological: negative   Psych: Negative       Past Medical and Surgical History:     Past Medical History:   Diagnosis Date    Acid reflux     Anxiety     RESOLVED: [id number]    Arthritis     Bipolar 2 disorder (HCC)     FOLLOWS WITH PSYCHIATRIST  CONTINUE LAMOTRIGINE; RESOLVED: [id number]    Depression     Familial tremor     both hands    Fibromyalgia     LAST ASSESSED: [id number]    Hearing aid worn     left ear    [place] (hard of hearing)     left ear    Hypertension     Left-sided weakness     Lower back pain     Memory loss of unknown cause     long and short term    Migraine     Obesity     Overactive bladder     Panic attack     Post traumatic stress disorder     Seasonal allergies     Stroke [facility])     questionable stroke 2009    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    CONT ASA 81 MG DAILY; RESOLVED: [id number]    Urinary incontinence     Wears dentures     partial lower / full upper    Wears glasses        Past Surgical History:   Procedure Laterality Date    BACK SURGERY       SECTION      COLONOSCOPY      RESOLVED: [id number]    EAR SURGERY      EGD      HYSTERECTOMY      MYRINGOTOMY W/ TUBES Left     NECK SURGERY  2019    AR CYSTOURETHROSCOPY N/A 2016    Procedure: CYSTOSCOPY, BOTOX INJECTION;  Surgeon: Tony Meneses MD;  [facility];  Service: Gynecology    AR IMPLANT SPINAL NEUROSTIM/ Right 2/10/2021    Procedure: REPLACEMENT IMPLANTABLE PULSE GENERATOR DORSAL SPINAL COLUMN STIMULATOR, RIGHT;  Surgeon: Landon Ugarte MD;  Location:  MAIN OR;  Service: Neurosurgery    AR PERCUT IMPLNT [address] Right 2020    Procedure: INSERTION THORACIC DORSAL COLUMN SPINAL CORD STIMULATOR PERCUTANEOUS W IMPLANTABLE PULSE GENERATOR, RIGHT;  Surgeon: Landon Ugarte MD;  Location:  MAIN OR;  Service: Neurosurgery    [address] [place] [address]    UPPER GASTROINTESTINAL ENDOSCOPY  2020       Meds/Allergies:    Medications Prior to Admission   Medication  acetaminophen (TYLENOL) 325 mg tablet    amLODIPine (NORVASC) 5 mg tablet    bisacodyl (DULCOLAX) 5 mg EC tablet    busPIRone (BUSPAR) 15 mg tablet    cetirizine (ZyrTEC) 10 mg tablet    cholecalciferol (VITAMIN D3) 1,000 units tablet    [employer] Vitamin C 500 MG tablet    Diapers & Supplies (Huggies Pull-Ups) MISC    diclofenac sodium (VOLTAREN) 1 %    DULoxetine (CYMBALTA) 60 mg delayed release capsule    ferrous sulfate 324 (65 Fe) mg    folic acid (FOLVITE) 1 mg tablet    hydrOXYzine HCL (ATARAX) 50 mg tablet    ipratropium-albuterol (DUO-NEB) 0 5-2 5 mg/3 mL nebulizer solution    lidocaine (LMX) 4 % cream    LORazepam (ATIVAN) 0 5 mg tablet    morphine (MS CONTIN) 15 mg 12 hr tablet    Mouthwashes (Biotene Dry Mouth) LIQD    Multiple Vitamins-Minerals (multivitamin with minerals) tablet    naloxone (NARCAN) 4 mg/0 1 mL nasal spray    nortriptyline (PAMELOR) 10 mg capsule    omeprazole (PriLOSEC) 20 mg delayed release capsule    ondansetron (ZOFRAN-ODT) 4 mg disintegrating tablet    oxybutynin (DITROPAN) 5 mg tablet    oxyCODONE (ROXICODONE) 5 mg immediate release tablet    Polyethylene Glycol 3350 (MIRALAX PO)    potassium chloride (K-DUR,KLOR-CON) 10 mEq tablet    potassium chloride (MICRO-K) 10 MEQ CR capsule    prazosin (MINIPRESS) 2 mg capsule    pregabalin (LYRICA) 150 mg capsule    pregabalin (LYRICA) 225 MG capsule    propranolol (INDERAL) 20 mg tablet    QUEtiapine (SEROquel) 300 mg tablet    Respiratory Therapy Supplies (Nebulizer) YUDY    Sennosides (SENEXON PO)    SIMETHICONE PO    sodium chloride (OCEAN) 0 65 % nasal spray    traZODone (DESYREL) 100 mg tablet    triamcinolone (KENALOG) 0 025 % cream    Valbenazine Tosylate (Ingrezza) 80 MG CAPS       No Known Allergies    Social History:     Marital Status:     Substance Use History:   Social History     Substance and Sexual Activity   Alcohol Use Not Currently    Comment: 2 x year; being a social drinker as per all scripts      Social History     Tobacco Use   Smoking Status Never Smoker   Smokeless Tobacco Never Used     Social History     Substance and Sexual Activity   Drug Use No       Family History:    Family History   Problem Relation Age of Onset    Colon cancer Mother     Alzheimer's disease Father     Stroke Father     Colon cancer Brother     Bipolar disorder Brother     Breast cancer Maternal Aunt     Colon cancer Maternal Aunt     Heart disease Other     Diabetes Other     Hypertension Other     Seizures Son     Depression Paternal Grandfather     No Known Problems Sister     No Known Problems Brother     Thyroid disease Neg Hx          OBJECTIVE:    Vitals:   Blood Pressure: 117/80 (05/03/21 1744)  Pulse: 68 (05/03/21 1533)  Temperature: 98 3 °F (36 8 °C) (05/03/21 1533)  Temp Source: Oral (05/02/21 2126)  Respirations: 18 (05/03/21 1533)  SpO2: 96 % (05/03/21 1533)    Physical Exam    General Appearance: Alert, cooperative, no distress  HEENT: Head normocephalic, atraumatic, without obvious abnormality  Heart: Normal rate and rhythm  Lungs: Non-labored breathing  No respiratory distress  Abdomen: Without distension  Psychiatric: AAOx3  Lower Extremity:  Vascular:   Pedal pulses are present  CRT < 3 seconds at the digits  +0/4 edema noted at bilateral lower extremities  Pedal hair is present  Skin temperature is WNL bilaterally  Musculoskeletal:  MMT is 1/5 in all muscle compartments RLE vs 4/5 LLE  ROM normal of left foot and limited secondary to pain left foot  Pain on palpation of left 1st TMTJ  5th metatarsal pinpoint pain noted   Pes planus noted of the right that is not remarkable to the left  Weakness of plantarflexion of the right digits     Dermatological:      No open lesions noted  Neurological:  Gross sensation is intact  Protective sensation is intact  Patient Denies numbness and/or paresthesias          Additional Data:     Lab Results: I have personally reviewed pertinent labs including:    Results from last 7 days   Lab Units 05/02/21  2025   WBC Thousand/uL 8 51   HEMOGLOBIN g/dL 14 6   HEMATOCRIT % 42 4   PLATELETS Thousands/uL 318   NEUTROS PCT % 78*   LYMPHS PCT % 12*   MONOS PCT % 8   EOS PCT % 0     Results from last 7 days   Lab Units 05/03/21  0929   POTASSIUM mmol/L 2 9*   CHLORIDE mmol/L 106   CO2 mmol/L 31   BUN mg/dL 12   CREATININE mg/dL 1 51*   CALCIUM mg/dL 8 7           Cultures: I have personally reviewed pertinent cultures including:              Imaging: I have personally reviewed pertinent films in PACS  Pathology, and Other Studies: I have personally reviewed pertinent reports  ** Please Note: Portions of the record may have been created with voice recognition software  Occasional wrong word or "sound a like" substitutions may have occurred due to the inherent limitations of voice recognition software  Read the chart carefully and recognize, using context, where substitutions have occurred   **

## 2021-05-03 NOTE — PLAN OF CARE
Problem: PHYSICAL THERAPY ADULT  Goal: Performs mobility at highest level of function for planned discharge setting  See evaluation for individualized goals  Description: Treatment/Interventions: ADL retraining, Functional transfer training, LE strengthening/ROM, Elevations, Therapeutic exercise, Endurance training, Cognitive reorientation, Patient/family training, Equipment eval/education, Bed mobility, Compensatory technique education, Continued evaluation, Spoke to nursing, OT  Equipment Recommended: Wheelchair       See flowsheet documentation for full assessment, interventions and recommendations  Note: Prognosis: Fair  Problem List: Decreased strength, Decreased range of motion, Decreased endurance, Impaired balance, Decreased mobility, Decreased coordination, Decreased cognition, Impaired judgement, Decreased safety awareness, Obesity, Orthopedic restrictions, Pain  Assessment: Pt is 60 y o  female seen for PT evaluation s/p admit to [facility] on 5/2/2021  Pt presenting w/ pain in back knees and B/L feet w/ recent fall and R metartasal fxs- son not home and pt unable to care for self at home  Of note- pt was Seen and discharged from [address] [place] [address] after sustaining fall, injuring her right foot  Found to have fractures of the right 2nd, 4th, and possibly 3rd metatarsal, was NWB- but walking on foot at home- on eval has posterior splint w/ ace wrap on    Current dx/ problem list includes: R metatarsal fxs; anxiety; ambulatory dysfunction   Pt    has a past medical history of Acid reflux, Anxiety, Arthritis, Bipolar 2 disorder ([address] ), Depression, Familial tremor, Fibromyalgia, Hearing aid worn, [place] (hard of hearing), Hypertension, Left-sided weakness, Lower back pain, Memory loss of unknown cause, Migraine, Obesity, Overactive bladder, Panic attack, Post traumatic stress disorder, Seasonal allergies, Stroke ([address] ), Thrombosis of cerebral arteries, Urinary incontinence, Wears dentures, and Wears glasses  Personal factors affecting pt at time of IE include: steps to enter environment, limited home support, anxiety w/ multiple ED visits past experience, inability to perform IADLs, inability to perform ADLs, inability to ambulate household distances, limited insight into impairments and recent fall(s)  Due to acute medical issues, NWB R LE  ongoing medical management dx; pain, fall risk, increased reliance on more restrictive AD compared to baseline;  decreased activity tolerance compared to baseline, increased assistance needed from caregiver at current time, continuous telemetry monitoring, multiple lines, decline in overall functional mobility status; health management issues; note unstable clinical picture (high complexity)   Prior to admission, pt reports living w/ son in an apt w/ 2 SKY (but son works and she is alone for extended time at home   Pt reports having WC; rollator and shower bench at home (+) multiple falls- reports she sometimes "has to crawl around the home" to access bed and bath  Currently pt  is requiring Lisandro A for bed skills; modA  Sit pivot xfer to drop arm chair/ commode w/ SBA of another for lines and safety  NWB to R LE in post splint- pt is unable to maintain NWB for standing trials w/ RW- unable to clear buttocks for full stand on attempts pt also limited by high anxiety requiring inc time and cues for safety and encouragement   Pt presents functioning below baseline and curre  Barriers to Discharge: Inaccessible home environment, Decreased caregiver support        PT Discharge Recommendation: (S) Post acute rehabilitation services     PT - OK to Discharge: Yes(to rehab when medically cleared )    See flowsheet documentation for full assessment

## 2021-05-03 NOTE — PROGRESS NOTES
INTERNAL MEDICINE RESIDENCY PROGRESS NOTE     Name: Rosy Vera   Age & Sex: 60 y o  female   MRN: [id number]  Unit/Bed#: CW2 218-01   Encounter: [id number]  Team: SOD Team B     PATIENT INFORMATION     Name: Rosy Vera   Age & Sex: 60 y o  female   MRN: [id number]  Hospital Stay Days: 1    ASSESSMENT/PLAN     Principal Problem:    Ambulatory dysfunction  Active Problems:    Fibromyalgia    Bipolar II disorder (HCC)    Generalized anxiety disorder    Hypertension    Insomnia    Migraine    Opioid dependence ([facility] )    Post traumatic stress disorder    Metatarsal fracture    Chronic back pain    CALE (acute kidney injury) ([facility] )    Tachycardia      Tachycardia  Assessment & Plan  Tachycardic on vitals checks overnight  Not on tele  No history of arrhythmia  Patient denies symptoms  She was noted by nursing to be very anxious for much of the night, possibly related to her son visiting  In the morning, HR normal while sleeping   - likely related to anxiety  - monitor with routine vitals  - can add tele if recurrent    CALE (acute kidney injury) ([facility] )  Assessment & Plan  Creatinine has acutely risen to 1 5 on morning labs, from 1 0 the previous evening  BUN 6->12 so not clearly prerenal, but given low BP will try IV hydration   - hold morphine, replace with oxycodone  - hold NSAIDs  - IV hydration:  cc bolus  - encourage PO fluids  - check UA  - monitor I/Os  - follow BMP in AM    Metatarsal fracture  Assessment & Plan  Mechanical fall 2 days ago, [facility] showed 2nd, 3rd, 4th non displaced fracture of metatarsals  Patient has been bearing weight on the right foot in ambulating around her house, this morning she reports the pain was too severe and she came into the ED, patient is requesting  Placement into a nursing facility     Xray obtained in ER today reviewed: Unchanged nondisplaced fractures at the bases of the 2nd and 4th metatarsals    Small intra-articular fracture at the base of the 3rd metatarsal   No significant widening between the 1st and 2nd metatarsals to suggest Lisfranc injury  -podiatry consult    Generalized anxiety disorder  Assessment & Plan  -continue home meds    Bipolar II disorder ([address] )  Assessment & Plan  -continue home meds    * Ambulatory dysfunction  Assessment & Plan  -post metatarsal fracture  -podiatry consult  -pt/ot consult for rehab  -case management consult        Disposition: Continue inpatient, await podiatry consult, PT/OT, probable placement     SUBJECTIVE     Patient seen and examined  No acute events overnight  Per RN, she was very anxious with her son visiting her  Sleeping well this morning and denies any complaints other than ongoing foot pain  Denies palpitations, chest pain, SOB, nausea or vomiting  OBJECTIVE     Vitals:    21 1823 21 1851 21 2126 21 0802   BP: 148/94 150/98 144/97 100/71   BP Location: Right arm  Right arm    Pulse: (!) 131 (!) 136 (!) 120 79   Resp: 22 (!) 30 18 18   Temp: 98 5 °F (36 9 °C) 100 1 °F (37 8 °C) 99 °F (37 2 °C) 98 5 °F (36 9 °C)   TempSrc: Oral  Oral    SpO2: 99% 98% 92% 92%      Temperature:   Temp (24hrs), Av 7 °F (37 1 °C), Min:97 3 °F (36 3 °C), Max:100 1 °F (37 8 °C)    Temperature: 98 5 °F (36 9 °C)  Intake & Output:  I/O     None        Weights: There is no height or weight on file to calculate BMI  Weight (last 2 days)     None        Physical Exam  Constitutional:       Appearance: She is well-developed  HENT:      Head: Normocephalic and atraumatic  Eyes:      General: No scleral icterus  Conjunctiva/sclera: Conjunctivae normal    Cardiovascular:      Rate and Rhythm: Normal rate and regular rhythm  Heart sounds: Normal heart sounds  No murmur  Pulmonary:      Effort: Pulmonary effort is normal       Breath sounds: Normal breath sounds  No wheezing or rales  Abdominal:      General: Bowel sounds are normal  There is no distension        Palpations: Abdomen is soft  Tenderness: There is no abdominal tenderness  There is no guarding  Musculoskeletal:         General: No tenderness or deformity  Skin:     General: Skin is warm and dry  Findings: No erythema  Neurological:      General: No focal deficit present  Mental Status: She is alert and oriented to person, place, and time  Psychiatric:         Mood and Affect: Mood normal          Behavior: Behavior normal        LABORATORY DATA     Labs: I have personally reviewed pertinent reports  Results from last 7 days   Lab Units 05/02/21 2025   WBC Thousand/uL 8 51   HEMOGLOBIN g/dL 14 6   HEMATOCRIT % 42 4   PLATELETS Thousands/uL 318   NEUTROS PCT % 78*   MONOS PCT % 8      Results from last 7 days   Lab Units 05/03/21  0929 05/02/21 2025   POTASSIUM mmol/L 2 9* 2 9*   CHLORIDE mmol/L 106 106   CO2 mmol/L 31 23   BUN mg/dL 12 6   CREATININE mg/dL 1 51* 1 07   CALCIUM mg/dL 8 7 9 3                            IMAGING & DIAGNOSTIC TESTING     Radiology Results: I have personally reviewed pertinent reports  Xr Foot 3+ Views Right    Result Date: 5/2/2021  Impression: Unchanged fractures of the 2nd and 4th metatarsals  Small intra-articular fracture base of 3rd metatarsal  Workstation performed: [id number]     Other Diagnostic Testing: I have personally reviewed pertinent reports      ACTIVE MEDICATIONS     Current Facility-Administered Medications   Medication Dose Route Frequency    acetaminophen (TYLENOL) tablet 975 mg  975 mg Oral Q6H [facility]    amLODIPine (NORVASC) tablet 5 mg  5 mg Oral Daily    busPIRone (BUSPAR) tablet 15 mg  15 mg Oral TID    calcium carbonate (TUMS) chewable tablet 500 mg  500 mg Oral Daily PRN    cholecalciferol (VITAMIN D3) tablet 1,000 Units  1,000 Units Oral Daily    DULoxetine (CYMBALTA) delayed release capsule 60 mg  60 mg Oral Daily    heparin (porcine) subcutaneous injection 7,500 Units  7,500 Units Subcutaneous Q8H [facility]    hydrOXYzine HCL (ATARAX) tablet 25 mg 25 mg Oral Q6H PRN    lactated ringers bolus 500 mL  500 mL Intravenous Once    nortriptyline (PAMELOR) capsule 10 mg  10 mg Oral HS    oxybutynin (DITROPAN) tablet 5 mg  5 mg Oral BID    oxyCODONE (ROXICODONE) immediate release tablet 10 mg  10 mg Oral Q4H PRN    oxyCODONE (ROXICODONE) IR tablet 5 mg  5 mg Oral Q4H PRN    pantoprazole (PROTONIX) EC tablet 40 mg  40 mg Oral Early Morning    polyethylene glycol (MIRALAX) packet 17 g  17 g Oral Daily    potassium chloride (K-DUR,KLOR-CON) CR tablet 40 mEq  40 mEq Oral Once    potassium chloride 20 mEq IVPB (premix)  20 mEq Intravenous Once    prazosin (MINIPRESS) capsule 2 mg  2 mg Oral Daily    pregabalin (LYRICA) capsule 225 mg  225 mg Oral Q12H [facility]    propranolol (INDERAL) tablet 20 mg  20 mg Oral BID    QUEtiapine (SEROquel) tablet 300 mg  300 mg Oral HS    senna-docusate sodium (SENOKOT S) 8 6-50 mg per tablet 1 tablet  1 tablet Oral HS    Valbenazine Tosylate CAPS 80 mg  80 mg Oral Daily       VTE Pharmacologic Prophylaxis: Heparin  VTE Mechanical Prophylaxis: sequential compression device    Portions of the record may have been created with voice recognition software  Occasional wrong word or "sound a like" substitutions may have occurred due to the inherent limitations of voice recognition software    Read the chart carefully and recognize, using context, where substitutions have occurred   ==  Barbie Mancera DO  [address]  Internal Medicine Residency PGY-3

## 2021-05-03 NOTE — PLAN OF CARE
Problem: OCCUPATIONAL THERAPY ADULT  Goal: Performs self-care activities at highest level of function for planned discharge setting  See evaluation for individualized goals  Description: Treatment Interventions: ADL retraining, Functional transfer training, UE strengthening/ROM, Endurance training, Cognitive reorientation, Patient/family training, Equipment evaluation/education, Compensatory technique education, Continued evaluation, Energy conservation, Activityengagement          See flowsheet documentation for full assessment, interventions and recommendations  Note: Limitation: Decreased ADL status, Decreased Safe judgement during ADL, Decreased endurance, Decreased self-care trans, Decreased high-level ADLs  Prognosis: Fair  Assessment: Pt is a 61 y/o female seen for OT eval s/p adm to [facility] w/ recent mechanical fall and 2nd, 3rd, 4th metatarsal fractures  Pt D'C'd home w/ surgical boot s/p fall  Pt presents this admission w complaint of being unable to care for herself @ home while her son works and reports chest tightness  Pt is dx'd w/ ambulatory dysfunction  Pt has posterior slab splint on R LE; maintained NWB in RLE 2/2 metatarsal fractures from recent fall  Pt  has a past medical history of Acid reflux, Anxiety, Arthritis, Bipolar 2 disorder ([address] ), Depression, Familial tremor, Fibromyalgia, Hearing aid worn, [place] (hard of hearing), Hypertension, Left-sided weakness, Lower back pain, Memory loss of unknown cause, Migraine, Obesity, Overactive bladder, Panic attack, Post traumatic stress disorder, Seasonal allergies, Stroke ([address] ), Thrombosis of cerebral arteries, Urinary incontinence, Wears dentures, and Wears glasses  Pt with active OT orders and up with assistance  orders  Pt lives with her son and aunt in 1 floor apt w/ 2 SKY  Pt recently has required assist w/ ADLS and assist for all IADLS, does not drive, & required use of DME PTA including a rollator, w/c and tub-bench   Pt is currently demonstrating the following occupational deficits: S-Min A UB ADLS, Mod-Max A LB ADLS, Min A bed mobility, Max A x2 attempted STS transfers, Mod A x1-2 for sit-pivot transfers w/ HHA  These deficits that are impacting pt's baseline areas of occupation are a result of the following impairments: pain, endurance, activity tolerance, functional mobility, forward functional reach, balance, trunk control, functional standing tolerance, unsupportive home environment, decreased I w/ ADLS/IADLS, cognitive impairments, decreased safety awareness and decreased insight into deficits  The following Occupational Performance Areas to address include: bathing/shower, toilet hygiene, dressing, socialization, health maintenance, functional mobility and clothing management  Based on the aforementioned OT evaluation, functional performance deficits, and assessments, pt has been identified as a high complexity evaluation  Recommend STR upon D/C  The patient's raw score on the AM-PAC Daily Activity inpatient short form is 16, standardized score is 35 96, less than 39 4  Patients at this level are likely to benefit from discharge to post-acute rehabilitation services  Please refer to the recommendation of the Occupational Therapist for safe discharge planning  Pt to continue to benefit from acute immediate OT services to addr     OT Discharge Recommendation: Post acute rehabilitation services  OT - OK to Discharge: Yes(when medically stable)     Tiffanie Thompson MS, OTR/L

## 2021-05-03 NOTE — ASSESSMENT & PLAN NOTE
Tachycardic on vitals checks overnight  Not on tele  No history of arrhythmia  Patient denies symptoms  She was noted by nursing to be very anxious for much of the night, possibly related to her son visiting   In the morning, HR normal while sleeping   - likely related to anxiety  - monitor with routine vitals  - can add tele if recurrent

## 2021-05-03 NOTE — CASE MANAGEMENT
LOS: Day 1  Bundle: pt is not a bundle  Readmission risk: pt is not a 30 day readmit    Cm reviewed role with pt's son Evonne Encarnacion over the phone at [phone]  Evonne Encarnacion reported that the pt lives with him in a 1st floor apartment building with 2 SKY  Pt required moderate assistance in the home as her son works long hours  Pt has a walker and shower chair in the home  Evonne Encarnacion reported hx of VNA in the past  Evonne Encarnacion confirmed hx of STR with Mick Wang confirmed hx of OPPT, but could not recall the name of the agency  PCP is Dr Zahraa Munoz; pharmacy [facility] choice [facility] [address] confirmed hx of [address] with dx of anxiety, depression and panic disorder  Evonne Encarnacion reported hx has a hx of OP counseling and has an IP psych stay in 2013, nothing new since  No reported hx of D&A  CM reviewed d/c planning process including the following: identifying help at home, patient preference for d/c planning needs, Discharge Lounge, [facility] Meds to Bed program, availability of treatment team to discuss questions or concerns patient and/or family may have regarding understanding medications and recognizing signs and symptoms once discharged  CM also encouraged patient to follow up with all recommended appointments after discharge  Patient advised of importance for patient and family to participate in managing patients medical well being  Patient/caregiver received discharge checklist  Content reviewed  Patient/caregiver encouraged to participate in discharge plan of care prior to discharge home

## 2021-05-03 NOTE — CASE MANAGEMENT
CM s/w pt's son [place] and informed that the pt is recommended for STR upon discharging from the hospital  [place] reported that pt has a hx of STR with [facility], but would like to look at other facilities  CM emailed a list of choices to [email or web address]    Cm will continue to follow

## 2021-05-04 ENCOUNTER — PATIENT OUTREACH (OUTPATIENT)
Dept: INTERNAL MEDICINE CLINIC | Facility: CLINIC | Age: 56
End: 2021-05-04

## 2021-05-04 PROBLEM — R00.0 TACHYCARDIA: Status: RESOLVED | Noted: 2021-05-03 | Resolved: 2021-05-04

## 2021-05-04 PROBLEM — N17.9 AKI (ACUTE KIDNEY INJURY) (HCC): Status: RESOLVED | Noted: 2021-05-03 | Resolved: 2021-05-04

## 2021-05-04 LAB
ANION GAP SERPL CALCULATED.3IONS-SCNC: 4 MMOL/L (ref 4–13)
BACTERIA UR QL AUTO: ABNORMAL /HPF
BILIRUB UR QL STRIP: ABNORMAL
BUN SERPL-MCNC: 15 MG/DL (ref 5–25)
CALCIUM SERPL-MCNC: 8.4 MG/DL (ref 8.3–10.1)
CHLORIDE SERPL-SCNC: 109 MMOL/L (ref 100–108)
CLARITY UR: ABNORMAL
CO2 SERPL-SCNC: 28 MMOL/L (ref 21–32)
COLOR UR: ABNORMAL
CREAT SERPL-MCNC: 0.87 MG/DL (ref 0.6–1.3)
ERYTHROCYTE [DISTWIDTH] IN BLOOD BY AUTOMATED COUNT: 14.2 % (ref 11.6–15.1)
GFR SERPL CREATININE-BSD FRML MDRD: 86 ML/MIN/1.73SQ M
GLUCOSE SERPL-MCNC: 89 MG/DL (ref 65–140)
GLUCOSE UR STRIP-MCNC: NEGATIVE MG/DL
HCT VFR BLD AUTO: 40.5 % (ref 34.8–46.1)
HGB BLD-MCNC: 13.1 G/DL (ref 11.5–15.4)
HGB UR QL STRIP.AUTO: NEGATIVE
HYALINE CASTS #/AREA URNS LPF: ABNORMAL /LPF
KETONES UR STRIP-MCNC: ABNORMAL MG/DL
LEUKOCYTE ESTERASE UR QL STRIP: ABNORMAL
MAGNESIUM SERPL-MCNC: 2.4 MG/DL (ref 1.6–2.6)
MCH RBC QN AUTO: 28.9 PG (ref 26.8–34.3)
MCHC RBC AUTO-ENTMCNC: 32.3 G/DL (ref 31.4–37.4)
MCV RBC AUTO: 89 FL (ref 82–98)
NITRITE UR QL STRIP: NEGATIVE
NON-SQ EPI CELLS URNS QL MICRO: ABNORMAL /HPF
PH UR STRIP.AUTO: 6.5 [PH]
PLATELET # BLD AUTO: 266 THOUSANDS/UL (ref 149–390)
PMV BLD AUTO: 9.5 FL (ref 8.9–12.7)
POTASSIUM SERPL-SCNC: 3.7 MMOL/L (ref 3.5–5.3)
PROT UR STRIP-MCNC: NEGATIVE MG/DL
RBC # BLD AUTO: 4.53 MILLION/UL (ref 3.81–5.12)
RBC #/AREA URNS AUTO: ABNORMAL /HPF
SODIUM SERPL-SCNC: 141 MMOL/L (ref 136–145)
SP GR UR STRIP.AUTO: 1.03 (ref 1–1.03)
UROBILINOGEN UR QL STRIP.AUTO: 1 E.U./DL
WBC # BLD AUTO: 3.93 THOUSAND/UL (ref 4.31–10.16)
WBC #/AREA URNS AUTO: ABNORMAL /HPF

## 2021-05-04 PROCEDURE — 85027 COMPLETE CBC AUTOMATED: CPT | Performed by: INTERNAL MEDICINE

## 2021-05-04 PROCEDURE — 83735 ASSAY OF MAGNESIUM: CPT | Performed by: INTERNAL MEDICINE

## 2021-05-04 PROCEDURE — 80048 BASIC METABOLIC PNL TOTAL CA: CPT | Performed by: INTERNAL MEDICINE

## 2021-05-04 PROCEDURE — 81001 URINALYSIS AUTO W/SCOPE: CPT | Performed by: INTERNAL MEDICINE

## 2021-05-04 RX ORDER — POTASSIUM CHLORIDE 20 MEQ/1
40 TABLET, EXTENDED RELEASE ORAL ONCE
Status: COMPLETED | OUTPATIENT
Start: 2021-05-04 | End: 2021-05-04

## 2021-05-04 RX ORDER — OXYCODONE HYDROCHLORIDE 5 MG/1
5 TABLET ORAL EVERY 4 HOURS PRN
Status: DISCONTINUED | OUTPATIENT
Start: 2021-05-04 | End: 2021-05-06

## 2021-05-04 RX ORDER — KETOROLAC TROMETHAMINE 30 MG/ML
15 INJECTION, SOLUTION INTRAMUSCULAR; INTRAVENOUS EVERY 6 HOURS PRN
Status: DISCONTINUED | OUTPATIENT
Start: 2021-05-04 | End: 2021-05-06

## 2021-05-04 RX ORDER — MORPHINE SULFATE 15 MG/1
15 TABLET, FILM COATED, EXTENDED RELEASE ORAL EVERY 12 HOURS SCHEDULED
Status: DISCONTINUED | OUTPATIENT
Start: 2021-05-04 | End: 2021-05-17 | Stop reason: HOSPADM

## 2021-05-04 RX ADMIN — AMLODIPINE BESYLATE 5 MG: 5 TABLET ORAL at 09:49

## 2021-05-04 RX ADMIN — ANTACID TABLETS 500 MG: 500 TABLET, CHEWABLE ORAL at 11:36

## 2021-05-04 RX ADMIN — POLYETHYLENE GLYCOL 3350 17 G: 17 POWDER, FOR SOLUTION ORAL at 09:48

## 2021-05-04 RX ADMIN — OXYBUTYNIN CHLORIDE 5 MG: 5 TABLET ORAL at 18:43

## 2021-05-04 RX ADMIN — NORTRIPTYLINE HYDROCHLORIDE 10 MG: 10 CAPSULE ORAL at 22:19

## 2021-05-04 RX ADMIN — DOCUSATE SODIUM AND SENNOSIDES 1 TABLET: 8.6; 5 TABLET ORAL at 22:19

## 2021-05-04 RX ADMIN — Medication 1000 UNITS: at 09:49

## 2021-05-04 RX ADMIN — PANTOPRAZOLE SODIUM 40 MG: 40 TABLET, DELAYED RELEASE ORAL at 06:56

## 2021-05-04 RX ADMIN — HEPARIN SODIUM 7500 UNITS: 5000 INJECTION INTRAVENOUS; SUBCUTANEOUS at 15:10

## 2021-05-04 RX ADMIN — BUSPIRONE HYDROCHLORIDE 15 MG: 10 TABLET ORAL at 09:48

## 2021-05-04 RX ADMIN — ACETAMINOPHEN 975 MG: 325 TABLET ORAL at 06:55

## 2021-05-04 RX ADMIN — BUSPIRONE HYDROCHLORIDE 15 MG: 10 TABLET ORAL at 18:43

## 2021-05-04 RX ADMIN — MORPHINE SULFATE 15 MG: 15 TABLET, FILM COATED, EXTENDED RELEASE ORAL at 11:30

## 2021-05-04 RX ADMIN — ACETAMINOPHEN 975 MG: 325 TABLET ORAL at 23:23

## 2021-05-04 RX ADMIN — ACETAMINOPHEN 975 MG: 325 TABLET ORAL at 18:43

## 2021-05-04 RX ADMIN — MORPHINE SULFATE 15 MG: 15 TABLET, FILM COATED, EXTENDED RELEASE ORAL at 23:23

## 2021-05-04 RX ADMIN — BUSPIRONE HYDROCHLORIDE 15 MG: 10 TABLET ORAL at 22:19

## 2021-05-04 RX ADMIN — HEPARIN SODIUM 7500 UNITS: 5000 INJECTION INTRAVENOUS; SUBCUTANEOUS at 22:19

## 2021-05-04 RX ADMIN — QUETIAPINE FUMARATE 300 MG: 100 TABLET ORAL at 22:19

## 2021-05-04 RX ADMIN — PREGABALIN 225 MG: 75 CAPSULE ORAL at 22:19

## 2021-05-04 RX ADMIN — OXYCODONE HYDROCHLORIDE 5 MG: 5 TABLET ORAL at 20:34

## 2021-05-04 RX ADMIN — OXYCODONE HYDROCHLORIDE 10 MG: 10 TABLET ORAL at 07:05

## 2021-05-04 RX ADMIN — DULOXETINE HYDROCHLORIDE 60 MG: 60 CAPSULE, DELAYED RELEASE ORAL at 09:48

## 2021-05-04 RX ADMIN — POTASSIUM CHLORIDE 40 MEQ: 1500 TABLET, EXTENDED RELEASE ORAL at 09:48

## 2021-05-04 RX ADMIN — PREGABALIN 225 MG: 75 CAPSULE ORAL at 09:48

## 2021-05-04 RX ADMIN — OXYBUTYNIN CHLORIDE 5 MG: 5 TABLET ORAL at 09:49

## 2021-05-04 RX ADMIN — HEPARIN SODIUM 7500 UNITS: 5000 INJECTION INTRAVENOUS; SUBCUTANEOUS at 06:57

## 2021-05-04 NOTE — PROGRESS NOTES
INTERNAL MEDICINE RESIDENCY PROGRESS NOTE     Name: Mallika Hopkins   Age & Sex: 60 y o  female   MRN: [id number]  Unit/Bed#: CW2 218-01   Encounter: [id number]  Team: SOD Team B     PATIENT INFORMATION     Name: Mallika Hopkins   Age & Sex: 60 y o  female   MRN: [id number]  Hospital Stay Days: 2    ASSESSMENT/PLAN     Principal Problem:    Ambulatory dysfunction  Active Problems:    Fibromyalgia    Bipolar II disorder ([facility] [address] )    Generalized anxiety disorder    Hypertension    Insomnia    Migraine    Opioid dependence ([facility] )    Left hip pain    Post traumatic stress disorder    Metatarsal fracture    Chronic back pain      Metatarsal fracture  Assessment & Plan  Mechanical fall 2 days ago, imaging at [facility] showed 2nd, 3rd, 4th non displaced fracture of metatarsals  Patient has been bearing weight on the right foot in ambulating around her house, this morning she reports the pain was too severe and she came into the ED, patient is requesting  Placement into a nursing facility     Xray obtained in ER today reviewed: Unchanged nondisplaced fractures at the bases of the 2nd and 4th metatarsals  Small intra-articular fracture at the base of the 3rd metatarsal   No significant widening between the 1st and 2nd metatarsals to suggest Lisfranc injury  -podiatry consulted  - CT scan done, surgery possibly indicated per podiatry    Left hip pain  Assessment & Plan  History of bilateral hip OA  Patient reports worsening of chronic left hip pain since her fall  Notes pain is worse with laying on left side  Associated numbness and tingling on anterolateral thigh  No groin pain  ROM intact, no pain with internal or external rotation, though difficulty weight bearing per RN    - suspect trochanteric bursitis and possibly concurrent meralgia paresthetica based on exam and description  - doubt acute joint pathology at this time  - added back Toradol now that CALE resolved  - continue home pregabalin    Opioid dependence [facility])  Assessment & Plan  PDMP reviewed  Receives pregabalin, MS Contin 15mg BID, and oxycodone 5mg daily PRN  - continue home regimen; oxy 5mg is ordered for severe pain PRN  - Toradol for moderate pain x 5 days  - closely monitor renal function given recent CALE with unclear trigger    Generalized anxiety disorder  Assessment & Plan  -continue home meds    Bipolar II disorder ([address] )  Assessment & Plan  -continue home meds    * Ambulatory dysfunction  Assessment & Plan  -post metatarsal fracture  -podiatry consult  -pt/ot consult for rehab  -case management consult    Tachycardia-resolved as of 5/4/2021  Assessment & Plan  Tachycardic on vitals checks overnight  Not on tele  No history of arrhythmia  Patient denies symptoms  She was noted by nursing to be very anxious for much of the night, possibly related to her son visiting  In the morning, HR normal while sleeping   - likely related to anxiety  - monitor with routine vitals  - can add tele if recurrent    CALE (acute kidney injury) (HCC)-resolved as of 5/4/2021  Assessment & Plan  Creatinine has acutely risen to 1 5 on morning labs, from 1 0 the previous evening  BUN 6->12 so not clearly prerenal, but given low BP will try IV hydration   - hold morphine, replace with oxycodone  - hold NSAIDs  - IV hydration:  cc bolus   - resolved  - encourage PO fluids  - check UA  - monitor I/Os  - follow BMP in AM      Disposition: Continue inpatient, await recommendations from podiatry regarding surgery vs nonoperative management     SUBJECTIVE     Patient seen and examined  No acute events overnight  She complains of left hip pain, seems acute on chronic, worsened when laying on the left side  Also with numbness and tingling radiating down front of the leg  No incontinence  Denies fever or chills       OBJECTIVE     Vitals:    05/03/21 1221 05/03/21 1533 05/03/21 1744 05/04/21 0745   BP: 103/72 106/71 117/80 108/70   BP Location:   Right arm    Pulse:  68  67   Resp: 18  16   Temp:  98 3 °F (36 8 °C)  97 6 °F (36 4 °C)   TempSrc:       SpO2:  96%  95%      Temperature:   Temp (24hrs), Av °F (36 7 °C), Min:97 6 °F (36 4 °C), Max:98 3 °F (36 8 °C)    Temperature: 97 6 °F (36 4 °C)  Intake & Output:  I/O       701 -  07 -  07 07 -  0700    P  O    360    I V   0     Total Intake  0 360    Urine  500 300    Total Output  500 300    Net  -500 +60               Weights: There is no height or weight on file to calculate BMI  Weight (last 2 days)     None        Physical Exam  Constitutional:       Appearance: She is well-developed  HENT:      Head: Normocephalic and atraumatic  Eyes:      General: No scleral icterus  Conjunctiva/sclera: Conjunctivae normal    Cardiovascular:      Rate and Rhythm: Normal rate and regular rhythm  Heart sounds: Normal heart sounds  No murmur  Pulmonary:      Effort: Pulmonary effort is normal       Breath sounds: Normal breath sounds  No wheezing or rales  Abdominal:      General: Bowel sounds are normal  There is no distension  Palpations: Abdomen is soft  Tenderness: There is no abdominal tenderness  There is no guarding  Musculoskeletal:         General: No tenderness or deformity  Right lower leg: No edema  Left lower leg: No edema  Comments: Mild tenderness at L trochanteric bursa  No pain with AROM of left hip  No pain with passive internal or external rotation  Skin:     General: Skin is warm and dry  Findings: No erythema  Neurological:      General: No focal deficit present  Mental Status: She is alert and oriented to person, place, and time  Psychiatric:         Mood and Affect: Mood normal          Behavior: Behavior normal        LABORATORY DATA     Labs: I have personally reviewed pertinent reports    Results from last 7 days   Lab Units 21   WBC Thousand/uL 3 93* 8 51   HEMOGLOBIN g/dL 13 1 14 6 HEMATOCRIT % 40 5 42 4   PLATELETS Thousands/uL 266 318   NEUTROS PCT %  --  78*   MONOS PCT %  --  8      Results from last 7 days   Lab Units 05/04/21  0720 05/03/21  0929 05/02/21 2025   POTASSIUM mmol/L 3 7 2 9* 2 9*   CHLORIDE mmol/L 109* 106 106   CO2 mmol/L 28 31 23   BUN mg/dL 15 12 6   CREATININE mg/dL 0 87 1 51* 1 07   CALCIUM mg/dL 8 4 8 7 9 3     Results from last 7 days   Lab Units 05/04/21  0720   MAGNESIUM mg/dL 2 4                        IMAGING & DIAGNOSTIC TESTING     Radiology Results: I have personally reviewed pertinent reports  Xr Foot 3+ Views Right    Result Date: 5/2/2021  Impression: Unchanged fractures of the 2nd and 4th metatarsals  Small intra-articular fracture base of 3rd metatarsal  Workstation performed: [id number]     Other Diagnostic Testing: I have personally reviewed pertinent reports      ACTIVE MEDICATIONS     Current Facility-Administered Medications   Medication Dose Route Frequency    acetaminophen (TYLENOL) tablet 975 mg  975 mg Oral Q6H [address]    amLODIPine (NORVASC) tablet 5 mg  5 mg Oral Daily    busPIRone (BUSPAR) tablet 15 mg  15 mg Oral TID    calcium carbonate (TUMS) chewable tablet 500 mg  500 mg Oral Daily PRN    cholecalciferol (VITAMIN D3) tablet 1,000 Units  1,000 Units Oral Daily    DULoxetine (CYMBALTA) delayed release capsule 60 mg  60 mg Oral Daily    heparin (porcine) subcutaneous injection 7,500 Units  7,500 Units Subcutaneous Q8H [address]    hydrOXYzine HCL (ATARAX) tablet 25 mg  25 mg Oral Q6H PRN    ketorolac (TORADOL) injection 15 mg  15 mg Intravenous Q6H PRN    morphine (MS CONTIN) ER tablet 15 mg  15 mg Oral Q12H [address]    nortriptyline (PAMELOR) capsule 10 mg  10 mg Oral HS    oxybutynin (DITROPAN) tablet 5 mg  5 mg Oral BID    oxyCODONE (ROXICODONE) IR tablet 5 mg  5 mg Oral Q4H PRN    pantoprazole (PROTONIX) EC tablet 40 mg  40 mg Oral Early Morning    polyethylene glycol (MIRALAX) packet 17 g  17 g Oral Daily    prazosin (MINIPRESS) capsule 2 mg  2 mg Oral Daily    pregabalin (LYRICA) capsule 225 mg  225 mg Oral Q12H [address]    propranolol (INDERAL) tablet 20 mg  20 mg Oral BID    QUEtiapine (SEROquel) tablet 300 mg  300 mg Oral HS    senna-docusate sodium (SENOKOT S) 8 6-50 mg per tablet 1 tablet  1 tablet Oral HS    sodium chloride 0 9 % infusion  20 mL/hr Intravenous Continuous PRN    Valbenazine Tosylate CAPS 80 mg  80 mg Oral Daily       VTE Pharmacologic Prophylaxis: Heparin  VTE Mechanical Prophylaxis: sequential compression device    Portions of the record may have been created with voice recognition software  Occasional wrong word or "sound a like" substitutions may have occurred due to the inherent limitations of voice recognition software    Read the chart carefully and recognize, using context, where substitutions have occurred   ==  Luisa Gloria,   [address]  Internal Medicine Residency PGY-3

## 2021-05-04 NOTE — UTILIZATION REVIEW
Inpatient Admission Authorization Request   NOTIFICATION OF INPATIENT ADMISSION/INPATIENT AUTHORIZATION REQUEST   SERVICING FACILITY:   [facility]  Address: [address] [place] [address]  Tax ID: [id number]  NPI: [id number]  Place of Service: Inpatient [address] [facility]  [address]  Place of Service Code: 24     ATTENDING PROVIDER:  Attending Name and NPI#: Ripley Hammans, Md [[id number]]  Address: [address] [place] [address]  Phone: [phone]     UTILIZATION REVIEW CONTACT:  Jann Dumont Utilization   Network Utilization Review Department  Phone: [phone]  Fax: [phone]  Email: [email or web address]     PHYSICIAN ADVISORY SERVICES:  FOR [id number] REVIEW - MEDICAL NECESSITY DENIAL  Phone: [phone]  Fax: [phone]  Email: [email or web address]     TYPE OF REQUEST:  Inpatient Status     ADMISSION INFORMATION:  ADMISSION DATE/TIME: 5/2/21 7528  PATIENT DIAGNOSIS CODE/DESCRIPTION:  Metatarsal fracture [S92 309A]  Pain [R52]  Chronic back pain [M54 9, G89 29]  Frequent falls [R29 6]  Bilateral chronic knee pain [M25 561, M25 562, G89 29]  Ambulatory dysfunction [R26 2]  DISCHARGE DATE/TIME: No discharge date for patient encounter  DISCHARGE DISPOSITION (IF DISCHARGED): Home/Self Care     IMPORTANT INFORMATION:  Please contact the Jann Dumont directly with any questions or concerns regarding this request  Department voicemails are confidential     Send requests for admission clinical reviews, concurrent reviews, approvals, and administrative denials due to lack of clinical to fax [phone]

## 2021-05-04 NOTE — ASSESSMENT & PLAN NOTE
History of bilateral hip OA  Patient reports worsening of chronic left hip pain since her fall  Notes pain is worse with laying on left side  Associated numbness and tingling on anterolateral thigh  No groin pain  ROM intact, no pain with internal or external rotation, though difficulty weight bearing per RN    - suspect trochanteric bursitis and possibly concurrent meralgia paresthetica based on exam and description  - doubt acute joint pathology at this time  - Toradol order in place x 5 days, patient had not received any, can reorder if pain recurs vs consider other NSAID  - continue home pregabalin

## 2021-05-04 NOTE — ASSESSMENT & PLAN NOTE
PDMP reviewed  Receives pregabalin, MS Contin 15mg BID, and oxycodone 5mg daily PRN    - continue home regimen

## 2021-05-05 ENCOUNTER — PATIENT OUTREACH (OUTPATIENT)
Dept: INTERNAL MEDICINE CLINIC | Facility: CLINIC | Age: 56
End: 2021-05-05

## 2021-05-05 LAB
ANION GAP SERPL CALCULATED.3IONS-SCNC: 6 MMOL/L (ref 4–13)
BUN SERPL-MCNC: 15 MG/DL (ref 5–25)
CALCIUM SERPL-MCNC: 8.4 MG/DL (ref 8.3–10.1)
CHLORIDE SERPL-SCNC: 110 MMOL/L (ref 100–108)
CO2 SERPL-SCNC: 27 MMOL/L (ref 21–32)
CREAT SERPL-MCNC: 0.75 MG/DL (ref 0.6–1.3)
ERYTHROCYTE [DISTWIDTH] IN BLOOD BY AUTOMATED COUNT: 13.9 % (ref 11.6–15.1)
GFR SERPL CREATININE-BSD FRML MDRD: 102 ML/MIN/1.73SQ M
GLUCOSE SERPL-MCNC: 77 MG/DL (ref 65–140)
HCT VFR BLD AUTO: 38.7 % (ref 34.8–46.1)
HGB BLD-MCNC: 12.4 G/DL (ref 11.5–15.4)
MCH RBC QN AUTO: 28.6 PG (ref 26.8–34.3)
MCHC RBC AUTO-ENTMCNC: 32 G/DL (ref 31.4–37.4)
MCV RBC AUTO: 89 FL (ref 82–98)
PLATELET # BLD AUTO: 251 THOUSANDS/UL (ref 149–390)
PMV BLD AUTO: 9.4 FL (ref 8.9–12.7)
POTASSIUM SERPL-SCNC: 3.9 MMOL/L (ref 3.5–5.3)
RBC # BLD AUTO: 4.33 MILLION/UL (ref 3.81–5.12)
SODIUM SERPL-SCNC: 143 MMOL/L (ref 136–145)
WBC # BLD AUTO: 4.45 THOUSAND/UL (ref 4.31–10.16)

## 2021-05-05 PROCEDURE — 80048 BASIC METABOLIC PNL TOTAL CA: CPT | Performed by: INTERNAL MEDICINE

## 2021-05-05 PROCEDURE — 97535 SELF CARE MNGMENT TRAINING: CPT

## 2021-05-05 PROCEDURE — 85027 COMPLETE CBC AUTOMATED: CPT | Performed by: INTERNAL MEDICINE

## 2021-05-05 PROCEDURE — 97110 THERAPEUTIC EXERCISES: CPT

## 2021-05-05 PROCEDURE — 99232 SBSQ HOSP IP/OBS MODERATE 35: CPT | Performed by: PODIATRIST

## 2021-05-05 RX ORDER — PREGABALIN 25 MG/1
25 CAPSULE ORAL 2 TIMES DAILY
Status: DISCONTINUED | OUTPATIENT
Start: 2021-05-05 | End: 2021-05-17 | Stop reason: HOSPADM

## 2021-05-05 RX ORDER — PREGABALIN 100 MG/1
200 CAPSULE ORAL 2 TIMES DAILY
Status: DISCONTINUED | OUTPATIENT
Start: 2021-05-05 | End: 2021-05-17 | Stop reason: HOSPADM

## 2021-05-05 RX ADMIN — BUSPIRONE HYDROCHLORIDE 15 MG: 10 TABLET ORAL at 16:19

## 2021-05-05 RX ADMIN — OXYCODONE HYDROCHLORIDE 5 MG: 5 TABLET ORAL at 20:38

## 2021-05-05 RX ADMIN — BUSPIRONE HYDROCHLORIDE 15 MG: 10 TABLET ORAL at 21:51

## 2021-05-05 RX ADMIN — OXYBUTYNIN CHLORIDE 5 MG: 5 TABLET ORAL at 08:51

## 2021-05-05 RX ADMIN — MORPHINE SULFATE 15 MG: 15 TABLET, FILM COATED, EXTENDED RELEASE ORAL at 10:28

## 2021-05-05 RX ADMIN — PREGABALIN 200 MG: 100 CAPSULE ORAL at 23:12

## 2021-05-05 RX ADMIN — ACETAMINOPHEN 975 MG: 325 TABLET ORAL at 11:51

## 2021-05-05 RX ADMIN — BUSPIRONE HYDROCHLORIDE 15 MG: 10 TABLET ORAL at 08:52

## 2021-05-05 RX ADMIN — PROPRANOLOL HYDROCHLORIDE 20 MG: 20 TABLET ORAL at 17:57

## 2021-05-05 RX ADMIN — DICLOFENAC SODIUM 2 G: 10 GEL TOPICAL at 17:58

## 2021-05-05 RX ADMIN — OXYCODONE HYDROCHLORIDE 5 MG: 5 TABLET ORAL at 06:35

## 2021-05-05 RX ADMIN — DICLOFENAC SODIUM 2 G: 10 GEL TOPICAL at 14:00

## 2021-05-05 RX ADMIN — OXYBUTYNIN CHLORIDE 5 MG: 5 TABLET ORAL at 17:58

## 2021-05-05 RX ADMIN — OXYCODONE HYDROCHLORIDE 5 MG: 5 TABLET ORAL at 11:52

## 2021-05-05 RX ADMIN — POLYETHYLENE GLYCOL 3350 17 G: 17 POWDER, FOR SOLUTION ORAL at 08:51

## 2021-05-05 RX ADMIN — PROPRANOLOL HYDROCHLORIDE 20 MG: 20 TABLET ORAL at 08:53

## 2021-05-05 RX ADMIN — DULOXETINE HYDROCHLORIDE 60 MG: 60 CAPSULE, DELAYED RELEASE ORAL at 08:51

## 2021-05-05 RX ADMIN — HEPARIN SODIUM 7500 UNITS: 5000 INJECTION INTRAVENOUS; SUBCUTANEOUS at 21:47

## 2021-05-05 RX ADMIN — QUETIAPINE FUMARATE 300 MG: 100 TABLET ORAL at 21:51

## 2021-05-05 RX ADMIN — HEPARIN SODIUM 7500 UNITS: 5000 INJECTION INTRAVENOUS; SUBCUTANEOUS at 14:01

## 2021-05-05 RX ADMIN — PANTOPRAZOLE SODIUM 40 MG: 40 TABLET, DELAYED RELEASE ORAL at 06:35

## 2021-05-05 RX ADMIN — PREGABALIN 225 MG: 75 CAPSULE ORAL at 08:51

## 2021-05-05 RX ADMIN — DOCUSATE SODIUM AND SENNOSIDES 1 TABLET: 8.6; 5 TABLET ORAL at 21:51

## 2021-05-05 RX ADMIN — MORPHINE SULFATE 15 MG: 15 TABLET, FILM COATED, EXTENDED RELEASE ORAL at 21:52

## 2021-05-05 RX ADMIN — ACETAMINOPHEN 975 MG: 325 TABLET ORAL at 06:35

## 2021-05-05 RX ADMIN — Medication 1000 UNITS: at 08:51

## 2021-05-05 RX ADMIN — DICLOFENAC SODIUM 2 G: 10 GEL TOPICAL at 21:50

## 2021-05-05 RX ADMIN — PREGABALIN 25 MG: 25 CAPSULE ORAL at 23:13

## 2021-05-05 RX ADMIN — AMLODIPINE BESYLATE 5 MG: 5 TABLET ORAL at 08:59

## 2021-05-05 RX ADMIN — NORTRIPTYLINE HYDROCHLORIDE 10 MG: 10 CAPSULE ORAL at 21:50

## 2021-05-05 RX ADMIN — HEPARIN SODIUM 7500 UNITS: 5000 INJECTION INTRAVENOUS; SUBCUTANEOUS at 06:32

## 2021-05-05 RX ADMIN — PRAZOSIN HYDROCHLORIDE 2 MG: 2 CAPSULE ORAL at 08:53

## 2021-05-05 NOTE — PLAN OF CARE
Problem: PHYSICAL THERAPY ADULT  Goal: Performs mobility at highest level of function for planned discharge setting  See evaluation for individualized goals  Description: Treatment/Interventions: ADL retraining, Functional transfer training, LE strengthening/ROM, Elevations, Therapeutic exercise, Endurance training, Cognitive reorientation, Patient/family training, Equipment eval/education, Bed mobility, Compensatory technique education, Continued evaluation, Spoke to nursing, OT  Equipment Recommended: Wheelchair       See flowsheet documentation for full assessment, interventions and recommendations  Outcome: Progressing  Note: Prognosis: Fair  Problem List: Decreased strength, Decreased endurance, Impaired balance, Decreased mobility, Decreased cognition, Decreased safety awareness, Obesity, Orthopedic restrictions, Pain  Assessment: Pt continues to present with decreased mobility, strength, and endurance  Pt was able to perform all aspects of bed mobility this session with decreased assistance  Pt was also able to sit EOB with fair balance while performing UE and LE TE  Time spent educating pt on HEP to perform while in hospital to help prevent decline in strength, pt verbalized understanding  Transfers deferred this session 2* pt's difficulty maintaining NWBing status on RLE  Continuing to recommend rehab upon d/c    Barriers to Discharge: Inaccessible home environment, Decreased caregiver support        PT Discharge Recommendation: Post acute rehabilitation services     PT - OK to Discharge: Yes(to rehab when medically cleared)    See flowsheet documentation for full assessment

## 2021-05-05 NOTE — PHYSICAL THERAPY NOTE
Physical Therapy Treatment Note    Patient's Name: Keshav Beckham  : 1965 0912   PT Last Visit   PT Visit Date 21   Note Type   Note Type Treatment   Pain Assessment   Pain Assessment Tool Pain Assessment not indicated - pt denies pain   Hospital Pain Intervention(s) Repositioned; Ambulation/increased activity   Restrictions/Precautions   Weight Bearing Precautions Per Order Yes   RLE Weight Bearing Per Order NWB   Braces or Orthoses Splint  (RLE posterior leg splint)   Other Precautions Cognitive;Multiple lines;WBS;Fall Risk;Pain   General   Chart Reviewed Yes   Family/Caregiver Present No   Cognition   Overall Cognitive Status Impaired   Arousal/Participation Alert; Cooperative   Attention Attends with cues to redirect   Orientation Level Oriented X4   Memory Decreased recall of precautions   Following Commands Follows one step commands with increased time or repetition   Comments pt pleasant and motivated to work with therapy   Bed Mobility   Rolling L 5  Supervision   Additional items Bedrails   Supine to Sit 5  Supervision   Additional items Assist x 1;HOB elevated; Increased time required   Sit to Supine 5  Supervision   Additional items Assist x 1; Increased time required   Additional Comments Pt able to sit EOB for ~20 minutes while performing LE TE with supervision assist   Ambulation/Elevation   Gait pattern Not appropriate   Balance   Static Sitting Fair   Dynamic Sitting Fair -   Activity Tolerance   Activity Tolerance Patient limited by fatigue   Nurse Made Aware ok to see per RN   Exercises   Hip Abduction Sitting;20 reps;Bilateral  (2x10 with 3s iso hold against therapist resistance)   Hip Adduction Sitting;20 reps;Bilateral  (2x10 with pillow)   Knee AROM Long Arc Quad Sitting;20 reps;AROM; Bilateral  (2x10)   Ankle Pumps Sitting;20 reps;AROM; Left   UE Exercise Sitting  (lateral weight shiftings onto forearms x5 each side)   Assessment   Prognosis Fair   Problem List Decreased strength;Decreased endurance; Impaired balance;Decreased mobility; Decreased cognition;Decreased safety awareness; Obesity;Orthopedic restrictions;Pain   Assessment Pt continues to present with decreased mobility, strength, and endurance  Pt was able to perform all aspects of bed mobility this session with decreased assistance  Pt was also able to sit EOB with fair balance while performing UE and LE TE  Time spent educating pt on HEP to perform while in hospital to help prevent decline in strength, pt verbalized understanding  Transfers deferred this session 2* pt's difficulty maintaining NWBing status on RLE  Continuing to recommend rehab upon d/c     Barriers to Discharge Inaccessible home environment;Decreased caregiver support   Goals   Patient Goals to get stronger   STG Expiration Date 05/13/21   PT Treatment Day 1   Plan   Treatment/Interventions Functional transfer training;LE strengthening/ROM; Therapeutic exercise; Endurance training;Bed mobility;Gait training;Spoke to nursing;Spoke to case management;OT   Progress Progressing toward goals   PT Frequency Other (Comment)  (3-5x/wk)   Recommendation   PT Discharge Recommendation Post acute rehabilitation services   Equipment Recommended Wheelchair   PT - OK to Discharge Yes  (to rehab when medically cleared)   AM-PAC Basic Mobility Inpatient   Turning in Bed Without Bedrails 3   Lying on Back to Sitting on Edge of Flat Bed 3   Moving Bed to Chair 1   Standing Up From Chair 1   Walk in Room 1   Climb 3-5 Stairs 1   Basic Mobility Inpatient Raw Score 10   Turning Head Towards Sound 4   Follow Simple Instructions 3   Low Function Basic Mobility Raw Score 17   Low Function Basic Mobility Standardized Score 27 46       Xiomara Mcknight, PT, DPT

## 2021-05-05 NOTE — CASE MANAGEMENT
CM informed that the patient no longer needs surgery and pt is medically stable for discharge to rehab  Pt's son Zamzam Gongora indicated choices for STR in [employer]  Multiple referrals were sent  Currently, [facility], [address] [place] [address] are able to accept this patient for STR  CM TC to pt's son to inform of the same  No response received, voice message left

## 2021-05-05 NOTE — PLAN OF CARE
Problem: PAIN - ADULT  Goal: Verbalizes/displays adequate comfort level or baseline comfort level  Description: Interventions:  - Encourage patient to monitor pain and request assistance  - Assess pain using appropriate pain scale  - Administer analgesics based on type and severity of pain and evaluate response  - Implement non-pharmacological measures as appropriate and evaluate response  - Consider cultural and social influences on pain and pain management  - Notify physician/advanced practitioner if interventions unsuccessful or patient reports new pain  Outcome: Progressing     Problem: INFECTION - ADULT  Goal: Absence or prevention of progression during hospitalization  Description: INTERVENTIONS:  - Assess and monitor for signs and symptoms of infection  - Monitor lab/diagnostic results  - Monitor all insertion sites, i e  indwelling lines, tubes, and drains  - Monitor endotracheal if appropriate and nasal secretions for changes in amount and color  - [place] appropriate cooling/warming therapies per order  - Administer medications as ordered  - Instruct and encourage patient and family to use good hand hygiene technique  - Identify and instruct in appropriate isolation precautions for identified infection/condition  Outcome: Progressing  Goal: Absence of fever/infection during neutropenic period  Description: INTERVENTIONS:  - Monitor WBC    Outcome: Progressing     Problem: SAFETY ADULT  Goal: Patient will remain free of falls  Description: INTERVENTIONS:  - Assess patient frequently for physical needs  -  Identify cognitive and physical deficits and behaviors that affect risk of falls    -  [place] fall precautions as indicated by assessment   - Educate patient/family on patient safety including physical limitations  - Instruct patient to call for assistance with activity based on assessment  - Modify environment to reduce risk of injury  - Consider OT/PT consult to assist with strengthening/mobility  Outcome: Progressing  Goal: Maintain or return to baseline ADL function  Description: INTERVENTIONS:  -  Assess patient's ability to carry out ADLs; assess patient's baseline for ADL function and identify physical deficits which impact ability to perform ADLs (bathing, care of mouth/teeth, toileting, grooming, dressing, etc )  - Assess/evaluate cause of self-care deficits   - Assess range of motion  - Assess patient's mobility; develop plan if impaired  - Assess patient's need for assistive devices and provide as appropriate  - Encourage maximum independence but intervene and supervise when necessary  - Involve family in performance of ADLs  - Assess for home care needs following discharge   - Consider OT consult to assist with ADL evaluation and planning for discharge  - Provide patient education as appropriate  Outcome: Progressing  Goal: Maintain or return mobility status to optimal level  Description: INTERVENTIONS:  - Assess patient's baseline mobility status (ambulation, transfers, stairs, etc )    - Identify cognitive and physical deficits and behaviors that affect mobility  - Identify mobility aids required to assist with transfers and/or ambulation (gait belt, sit-to-stand, lift, walker, cane, etc )  - [place] fall precautions as indicated by assessment  - Record patient progress and toleration of activity level on Mobility SBAR; progress patient to next Phase/Stage  - Instruct patient to call for assistance with activity based on assessment  - Consider rehabilitation consult to assist with strengthening/weightbearing, etc   Outcome: Progressing     Problem: DISCHARGE PLANNING  Goal: Discharge to home or other facility with appropriate resources  Description: INTERVENTIONS:  - Identify barriers to discharge w/patient and caregiver  - Arrange for needed discharge resources and transportation as appropriate  - Identify discharge learning needs (meds, wound care, etc )  - Arrange for interpretive services to assist at discharge as needed  - Refer to Case Management Department for coordinating discharge planning if the patient needs post-hospital services based on physician/advanced practitioner order or complex needs related to functional status, cognitive ability, or social support system  Outcome: Progressing     Problem: Knowledge Deficit  Goal: Patient/family/caregiver demonstrates understanding of disease process, treatment plan, medications, and discharge instructions  Description: Complete learning assessment and assess knowledge base  Interventions:  - Provide teaching at level of understanding  - Provide teaching via preferred learning methods  Outcome: Progressing     Problem: Potential for Falls  Goal: Patient will remain free of falls  Description: INTERVENTIONS:  - Assess patient frequently for physical needs  -  Identify cognitive and physical deficits and behaviors that affect risk of falls    -  [place] fall precautions as indicated by assessment   - Educate patient/family on patient safety including physical limitations  - Instruct patient to call for assistance with activity based on assessment  - Modify environment to reduce risk of injury  - Consider OT/PT consult to assist with strengthening/mobility  Outcome: Progressing     Problem: Prexisting or High Potential for Compromised Skin Integrity  Goal: Skin integrity is maintained or improved  Description: INTERVENTIONS:  - Identify patients at risk for skin breakdown  - Assess and monitor skin integrity  - Assess and monitor nutrition and hydration status  - Monitor labs   - Assess for incontinence   - Turn and reposition patient  - Assist with mobility/ambulation  - Relieve pressure over bony prominences  - Avoid friction and shearing  - Provide appropriate hygiene as needed including keeping skin clean and dry  - Evaluate need for skin moisturizer/barrier cream  - Collaborate with interdisciplinary team   - Patient/family teaching  - Consider wound care consult   Outcome: Progressing
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
## 2021-05-05 NOTE — OCCUPATIONAL THERAPY NOTE
Occupational Therapy Treatment Note      Samantha Lion    2021    Principal Problem:    Ambulatory dysfunction  Active Problems:    Fibromyalgia    Bipolar II disorder (HCC)    Generalized anxiety disorder    Hypertension    Insomnia    Migraine    Opioid dependence (HCC)    Left hip pain    Post traumatic stress disorder    Metatarsal fracture    Chronic back pain      Past Medical History:   Diagnosis Date    Acid reflux     Anxiety     RESOLVED: [id number]    Arthritis     Bipolar 2 disorder (HCC)     FOLLOWS WITH PSYCHIATRIST  CONTINUE LAMOTRIGINE; RESOLVED: [id number]    Depression     Familial tremor     both hands    Fibromyalgia     LAST ASSESSED: [id number]    Hearing aid worn     left ear    [place] (hard of hearing)     left ear    Hypertension     Left-sided weakness     Lower back pain     Memory loss of unknown cause     long and short term    Migraine     Obesity     Overactive bladder     Panic attack     Post traumatic stress disorder     Seasonal allergies     Stroke 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    CONT ASA 81 MG DAILY; RESOLVED: [id number]    Urinary incontinence     Wears dentures     partial lower / full upper    Wears glasses        Past Surgical History:   Procedure Laterality Date    BACK SURGERY       SECTION      COLONOSCOPY      RESOLVED: [id number]    EAR SURGERY      EGD      HYSTERECTOMY  2004    MYRINGOTOMY W/ TUBES Left     NECK SURGERY  2019    [place] CYSTOURETHROSCOPY N/A 2016    Procedure: CYSTOSCOPY, BOTOX INJECTION;  Surgeon: Pa Quesada MD;  Location: [facility];  Service: Gynecology    [place] IMPLANT SPINAL NEUROSTIM/ Right 2/10/2021    Procedure: REPLACEMENT IMPLANTABLE PULSE GENERATOR DORSAL SPINAL COLUMN STIMULATOR, RIGHT;  Surgeon: Matt Carrillo MD;  [facility];  Service: Neurosurgery    AZ PERCUT IMPLNT [address] Right 7/28/2020    Procedure: INSERTION THORACIC DORSAL COLUMN SPINAL CORD STIMULATOR PERCUTANEOUS W IMPLANTABLE PULSE GENERATOR, RIGHT;  Surgeon: Rich Gomes MD;  Location:  MAIN OR;  Service: Neurosurgery    TONSILLECTOMY     [address] [place]    UPPER GASTROINTESTINAL ENDOSCOPY  09/2020 05/05/21 1017   OT Last Visit   OT Visit Date 05/05/21   Note Type   Note Type Treatment   Restrictions/Precautions   Weight Bearing Precautions Per Order Yes   RLE Weight Bearing Per Order NWB   Braces or Orthoses Splint; Other (Comment)  (RLE posterior splint)   Other Precautions Cognitive;WBS;Multiple lines; Fall Risk;Pain   Lifestyle   Autonomy I ADLs, assist IADLS, Mod I w/ transfers and functional mobility PTA   Reciprocal Relationships Pt lives w/ her son and aunt; he is not home during the day  He is the manager of    Service to Others Unemployed   Intrinsic Gratification Enjoys being more active   Pain Assessment   Pain Assessment Tool 0-10   Pain Score 8   Pain Location/Orientation Orientation: Left; Location: Leg;Orientation: Lower; Location: Hip   Pain Onset/Description Onset: Ongoing; Descriptor: Aching;Descriptor: Discomfort   Patient's Stated Pain Goal No pain   Hospital Pain Intervention(s) Repositioned; Ambulation/increased activity; Emotional support   ADL   Grooming Assistance 5  Supervision/Setup   Grooming Deficit Setup;Verbal cueing;Supervision/safety; Increased time to complete; Teeth care   Grooming Comments Pt completed grooming while seated EOB w/ setup, able to complete oral care   UB Bathing Assistance 4  Minimal Assistance   UB Bathing Deficit Setup;Verbal cueing;Supervision/safety; Increased time to complete; Chest;Right arm;Left arm   UB Bathing Comments pt completed UB bathing while seated EOB w/ setup  Able to bathe R/L arms and chest  Assist to bathe backside   LB Bathing Assistance 4  Minimal Assistance   LB Bathing Deficit Setup;Steadying;Verbal cueing;Supervision/safety; Increased time to complete;Perineal area;Right upper leg;Left lower leg including foot; Left upper leg;Right lower leg including foot   LB Bathing Comments pt completed LB bathing while seated EOB w/ setup  pt able to bathe upper/lower legs while seated EOB w/ S for dynamic reach  Attempted bathing perineal area in standing however pt unable to safely achieve upright position while maintaining WBS of RLE  Completed perineal hygiene @ EOB w/ VC for proper technique  UB Dressing Assistance 5  Supervision/Setup   UB Dressing Deficit Setup;Supervision/safety; Increased time to complete;Verbal cueing; Thread RUE; Thread LUE;Pull around back   UB Dressing Comments pt don/[facility] [place] gown while seated EOB; able to thread B/L UE's into/out of gown   Bed Mobility   Rolling R 5  Supervision   Additional items Verbal cues   Rolling L 5  Supervision   Additional items Verbal cues   Supine to Sit 5  Supervision   Additional items Increased time required;Verbal cues; Assist x 1   Sit to Supine 5  Supervision   Additional items Increased time required;Verbal cues; Assist x 1   Additional Comments Pt went from supine<>sit w/ S, HOB elevated for assist  Pt sat EOB w/ Fair sitting balance/trunk control for approx 10 mins while seated EOB to complete UB/LB ADLS  pt performed lateral side scoots @ EOB w/ S, use of B/L UE's to initiate mvmt  Pt rolled L/R in bed once in supine w/ supervision   Transfers   Sit to Stand 3  Moderate assistance   Additional items Assist x 1; Increased time required;Verbal cues   Stand to Sit 3  Moderate assistance   Additional items Assist x 1; Increased time required;Verbal cues   Additional Comments Attempted 2 STS from EOB, 1st attempt w/ HHA; pt unable to maintain WBS and unable to achieve full stand  2nd attempt w/ use of RW; pt cleared buttocks off bed but again unable to safely maintain WBS in standing  Cognition   Overall Cognitive Status Impaired   Arousal/Participation Responsive; Cooperative   Attention Attends with cues to redirect   Orientation Level Oriented X4   Memory Decreased recall of precautions;Decreased recall of recent events   Following Commands Follows one step commands without difficulty   Comments Pt is pleasant and cooperative; needs VC for reminders of WBS  Has decreased understanding of deficits/safety awareness  Activity Tolerance   Activity Tolerance Patient limited by pain; Patient limited by fatigue   Medical Staff Made Aware RN   Assessment   Assessment Patient participated in Skilled OT session 5/5/2021 with interventions consisting of ADL re training with the use of correct body mechnaics, Energy Conservation techniques, deep breathing technique, safety awareness and fall prevention techniques, maintaining weight bearing restrictions, therapeutic exercise to: increase functional use of BUEs, increase BUE muscle strength ,  therapeutic activities to: increase activity tolerance, increase standing tolerance time with unilateral UE support to complete sink level ADLs, increase dynamic sit/ stand balance during functional activity , increase postural control, increase trunk control and increase OOB/ sitting tolerance   Patient agreeable to OT treatment session, upon arrival patient was found supine in bed  In comparison to previous session, patient with improvements in bed mobility, EOB sitting tolerance/balance, UB/LB ADLS, and activity tolerance  Patient requiring verbal cues for safety, verbal cues for correct technique, verbal cues for pacing thru activity steps and frequent rest periods  Patient continues to be functioning below baseline level, occupational performance remains limited secondary to factors listed above and increased risk for falls and injury  From OT standpoint, recommendation at time of d/c would be Short Term Rehab when medically stable  The patient's raw score on the AM-PAC Daily Activity inpatient short form is 18, standardized score is 38 66, less than 39 4   Patients at this level are likely to benefit from discharge to post-acute rehabilitation services  Please refer to the recommendation of the Occupational Therapist for safe discharge planning  Patient to benefit from continued Occupational Therapy treatment while in the hospital to address deficits as defined above and maximize level of functional independence with ADLs and functional mobility  Plan   Treatment Interventions ADL retraining;Functional transfer training;UE strengthening/ROM; Endurance training;Cognitive reorientation;Patient/family training;Equipment evaluation/education; Compensatory technique education;Continued evaluation; Energy conservation; Activityengagement   Goal Expiration Date 05/17/21   OT Treatment Day 1   OT Frequency 3-5x/wk   Recommendation   OT Discharge Recommendation Post acute rehabilitation services   OT - OK to Discharge Yes  (when medically stable)   AM-PAC Daily Activity Inpatient   Lower Body Dressing 2   Bathing 3   Toileting 2   Upper Body Dressing 3   Grooming 4   Eating 4   Daily Activity Raw Score 18   Daily Activity Standardized Score (Calc for Raw Score >=11) 38 66   AM-PAC Applied Cognition Inpatient   Following a Speech/Presentation 3   Understanding Ordinary Conversation 4   Taking Medications 4   Remembering Where Things Are Placed or Put Away 3   Remembering List of 4-5 Errands 3   Taking Care of Complicated Tasks 2   Applied Cognition Raw Score 19   Applied Cognition Standardized Score 39 77       Chaparro Paredes MS, OTR/L

## 2021-05-05 NOTE — PLAN OF CARE
Problem: OCCUPATIONAL THERAPY ADULT  Goal: Performs self-care activities at highest level of function for planned discharge setting  See evaluation for individualized goals  Description: Treatment Interventions: ADL retraining, Functional transfer training, UE strengthening/ROM, Endurance training, Cognitive reorientation, Patient/family training, Equipment evaluation/education, Compensatory technique education, Continued evaluation, Energy conservation, Activityengagement          See flowsheet documentation for full assessment, interventions and recommendations  5/5/2021 1458 by Cherry Alexis OT  Outcome: Progressing  Note: Limitation: Decreased ADL status, Decreased Safe judgement during ADL, Decreased endurance, Decreased self-care trans, Decreased high-level ADLs  Prognosis: Fair  Assessment: Patient participated in Skilled OT session 5/5/2021 with interventions consisting of ADL re training with the use of correct body mechnaics, Energy Conservation techniques, deep breathing technique, safety awareness and fall prevention techniques, maintaining weight bearing restrictions, therapeutic exercise to: increase functional use of BUEs, increase BUE muscle strength ,  therapeutic activities to: increase activity tolerance, increase standing tolerance time with unilateral UE support to complete sink level ADLs, increase dynamic sit/ stand balance during functional activity , increase postural control, increase trunk control and increase OOB/ sitting tolerance   Patient agreeable to OT treatment session, upon arrival patient was found supine in bed  In comparison to previous session, patient with improvements in bed mobility, EOB sitting tolerance/balance, UB/LB ADLS, and activity tolerance  Patient requiring verbal cues for safety, verbal cues for correct technique, verbal cues for pacing thru activity steps and frequent rest periods   Patient continues to be functioning below baseline level, occupational performance remains limited secondary to factors listed above and increased risk for falls and injury  From OT standpoint, recommendation at time of d/c would be Short Term Rehab when medically stable  The patient's raw score on the AM-PAC Daily Activity inpatient short form is 18, standardized score is 38 66, less than 39 4  Patients at this level are likely to benefit from discharge to post-acute rehabilitation services  Please refer to the recommendation of the Occupational Therapist for safe discharge planning  Patient to benefit from continued Occupational Therapy treatment while in the hospital to address deficits as defined above and maximize level of functional independence with ADLs and functional mobility  OT Discharge Recommendation: Post acute rehabilitation services  OT - OK to Discharge: Yes(when medically stable)  Maddy Mcmahon MS, OTR/L    5/5/2021 1458 by Maddy Mcmahon OT  Outcome: Progressing  Note: Limitation: Decreased ADL status, Decreased Safe judgement during ADL, Decreased endurance, Decreased self-care trans, Decreased high-level ADLs  Prognosis: Fair  Assessment: Patient participated in Skilled OT session 5/5/2021 with interventions consisting of ADL re training with the use of correct body mechnaics, Energy Conservation techniques, deep breathing technique, safety awareness and fall prevention techniques, maintaining weight bearing restrictions, therapeutic exercise to: increase functional use of BUEs, increase BUE muscle strength ,  therapeutic activities to: increase activity tolerance, increase standing tolerance time with unilateral UE support to complete sink level ADLs, increase dynamic sit/ stand balance during functional activity , increase postural control, increase trunk control and increase OOB/ sitting tolerance   Patient agreeable to OT treatment session, upon arrival patient was found supine in bed    In comparison to previous session, patient with improvements in bed mobility, EOB sitting tolerance/balance, UB/LB ADLS, and activity tolerance  Patient requiring verbal cues for safety, verbal cues for correct technique, verbal cues for pacing thru activity steps and frequent rest periods  Patient continues to be functioning below baseline level, occupational performance remains limited secondary to factors listed above and increased risk for falls and injury  From OT standpoint, recommendation at time of d/c would be Short Term Rehab when medically stable  The patient's raw score on the AM-PAC Daily Activity inpatient short form is 18, standardized score is 38 66, less than 39 4  Patients at this level are likely to benefit from discharge to post-acute rehabilitation services  Please refer to the recommendation of the Occupational Therapist for safe discharge planning  Patient to benefit from continued Occupational Therapy treatment while in the hospital to address deficits as defined above and maximize level of functional independence with ADLs and functional mobility       OT Discharge Recommendation: Post acute rehabilitation services  OT - OK to Discharge: Yes(when medically stable)

## 2021-05-05 NOTE — PROGRESS NOTES
Outpatient Care Management Note:    Inpatient  Sebastian Clark reached out to outpatient  Demi Chiu regarding patient and discharge planning  I joined conversation  Patient is medically cleared for discharge and to be discharged to short term rehab  Per my chart review no surgical intervention needed for fractures to right foot  Patient to remain non weight bearing with posterior splint  We did share that patient has been offered PA waiver program several times and has declined  Elfrieda Mellisa did report that patient's mental health provider is to be helping patient obtain ICM

## 2021-05-05 NOTE — CASE MANAGEMENT
CM placed another TC [facility] ([phone])  No response received, another voice message was left  Updated 441    CM received a return call from pt's son Rain Mohamud who reported that he will speak with his sister and mother later on today regarding rehab and will get back to CM with a decision  HUNTER will continue to follow

## 2021-05-05 NOTE — PROGRESS NOTES
INTERNAL MEDICINE RESIDENCY PROGRESS NOTE     Name: Jonh Mccann   Age & Sex: 60 y o  female   MRN: [id number]  Unit/Bed#: CW2 218-01   Encounter: [id number]  Team: SOD Team B     PATIENT INFORMATION     Name: Jonh Mccann   Age & Sex: 60 y o  female   MRN: [id number]  Hospital Stay Days: 3    ASSESSMENT/PLAN     Principal Problem:    Ambulatory dysfunction  Active Problems:    Fibromyalgia    Bipolar II disorder (HCC)    Generalized anxiety disorder    Hypertension    Insomnia    Migraine    Opioid dependence ([address] )    Left hip pain    Post traumatic stress disorder    Metatarsal fracture    Chronic back pain      Metatarsal fracture  Assessment & Plan  Mechanical fall 2 days ago, imaging at [facility] showed 2nd, 3rd, 4th non displaced fracture of metatarsals  Patient has been bearing weight on the right foot in ambulating around her house, this morning she reports the pain was too severe and she came into the ED, patient is requesting  Placement into a nursing facility     Xray obtained in ER reviewed: Unchanged nondisplaced fractures at the bases of the 2nd and 4th metatarsals  Small intra-articular fracture at the base of the 3rd metatarsal   No significant widening between the 1st and 2nd metatarsals to suggest Lisfranc injury  - podiatry consulted - no surgical intervention indicated  - NWB to RLE  - rehab placement pending    Left hip pain  Assessment & Plan  History of bilateral hip OA  Patient reports worsening of chronic left hip pain since her fall  Notes pain is worse with laying on left side  Associated numbness and tingling on anterolateral thigh  No groin pain  ROM intact, no pain with internal or external rotation, though difficulty weight bearing per RN    - suspect trochanteric bursitis and possibly concurrent meralgia paresthetica based on exam and description  - doubt acute joint pathology at this time  - added back Toradol now that CALE resolved  - continue home pregabalin    Opioid dependence ([facility] )  Assessment & Plan  PDMP reviewed  Receives pregabalin, MS Contin 15mg BID, and oxycodone 5mg daily PRN  - continue home regimen; oxy 5mg is ordered for severe pain PRN  - Toradol for moderate pain x 5 days  - monitor renal function given recent CALE with unclear trigger    Generalized anxiety disorder  Assessment & Plan  -continue home meds    Bipolar II disorder ([facility] )  Assessment & Plan  -continue home meds    * Ambulatory dysfunction  Assessment & Plan  Present at baseline though worsened in the setting of metatarsal fracture  - rehab placement pending    Tachycardia-resolved as of 2021  Assessment & Plan  Tachycardic on vitals checks overnight  Not on tele  No history of arrhythmia  Patient denies symptoms  She was noted by nursing to be very anxious for much of the night, possibly related to her son visiting  In the morning, HR normal while sleeping   - likely related to anxiety  - monitor with routine vitals  - can add tele if recurrent    CALE (acute kidney injury) (HCC)-resolved as of 2021  Assessment & Plan  Creatinine has acutely risen to 1 5 on morning labs, from 1 0 the previous evening  BUN 6->12 so not clearly prerenal, but given low BP will try IV hydration   - hold morphine, replace with oxycodone  - hold NSAIDs  - IV hydration:  cc bolus   - resolved  - encourage PO fluids  - check UA  - monitor I/Os  - follow BMP in AM        Disposition: Pending placement     SUBJECTIVE     Patient seen and examined  No acute events overnight  Denies any new complaints today      OBJECTIVE     Vitals:    21 1642 21 2213 21 0053 21 0716   BP: 109/86  108/61 125/85   Pulse:   66 67   Resp: 18  18 18   Temp: 99 4 °F (37 4 °C)  98 4 °F (36 9 °C)    TempSrc: Oral      SpO2:  98% 93% 94%      Temperature:   Temp (24hrs), Av 9 °F (37 2 °C), Min:98 4 °F (36 9 °C), Max:99 4 °F (37 4 °C)    Temperature: 98 4 °F (36 9 °C)  Intake & Output:  I/O 05/03 0701 - 05/04 0700 05/04 0701 - 05/05 0700 05/05 0701 - 05/06 0700    P  O   960 360    I V  0      Total Intake 0 960 360    Urine 500 1200     Total Output 500 1200     Net -500 -240 +360               Weights: There is no height or weight on file to calculate BMI  Weight (last 2 days)     None        Physical Exam  LABORATORY DATA     Labs: I have personally reviewed pertinent reports  Results from last 7 days   Lab Units 05/05/21  0639 05/04/21  0720 05/02/21 2025   WBC Thousand/uL 4 45 3 93* 8 51   HEMOGLOBIN g/dL 12 4 13 1 14 6   HEMATOCRIT % 38 7 40 5 42 4   PLATELETS Thousands/uL 251 266 318   NEUTROS PCT %  --   --  78*   MONOS PCT %  --   --  8      Results from last 7 days   Lab Units 05/05/21  0639 05/04/21  0720 05/03/21  0929   POTASSIUM mmol/L 3 9 3 7 2 9*   CHLORIDE mmol/L 110* 109* 106   CO2 mmol/L 27 28 31   BUN mg/dL 15 15 12   CREATININE mg/dL 0 75 0 87 1 51*   CALCIUM mg/dL 8 4 8 4 8 7     Results from last 7 days   Lab Units 05/04/21  0720   MAGNESIUM mg/dL 2 4                        IMAGING & DIAGNOSTIC TESTING     Radiology Results: I have personally reviewed pertinent reports  Xr Foot 3+ Vw Left    Result Date: 5/4/2021  Impression: Severe hallux valgus deformity  Lisfranc alignment appears grossly maintained  Workstation performed: [id number]     Xr Foot 3+ Views Right    Result Date: 5/2/2021  Impression: Unchanged fractures of the 2nd and 4th metatarsals  Small intra-articular fracture base of 3rd metatarsal  Workstation performed: [id number]     Ct Lower Extremity Wo Contrast Right    Result Date: 5/4/2021  Impression: 1  Intact Lisfranc ligament  2   Mildly displaced fractures noted at the 2nd through 4th metatarsal bases without intra-articular component and without additional fracture identified  Workstation performed: [id number]     Other Diagnostic Testing: I have personally reviewed pertinent reports      ACTIVE MEDICATIONS     Current Facility-Administered Medications   Medication Dose Route Frequency    acetaminophen (TYLENOL) tablet 975 mg  975 mg Oral Q6H [address]    amLODIPine (NORVASC) tablet 5 mg  5 mg Oral Daily    busPIRone (BUSPAR) tablet 15 mg  15 mg Oral TID    calcium carbonate (TUMS) chewable tablet 500 mg  500 mg Oral Daily PRN    cholecalciferol (VITAMIN D3) tablet 1,000 Units  1,000 Units Oral Daily    DULoxetine (CYMBALTA) delayed release capsule 60 mg  60 mg Oral Daily    heparin (porcine) subcutaneous injection 7,500 Units  7,500 Units Subcutaneous Q8H [address]    hydrOXYzine HCL (ATARAX) tablet 25 mg  25 mg Oral Q6H PRN    ketorolac (TORADOL) injection 15 mg  15 mg Intravenous Q6H PRN    morphine (MS CONTIN) ER tablet 15 mg  15 mg Oral Q12H [address]    nortriptyline (PAMELOR) capsule 10 mg  10 mg Oral HS    oxybutynin (DITROPAN) tablet 5 mg  5 mg Oral BID    oxyCODONE (ROXICODONE) IR tablet 5 mg  5 mg Oral Q4H PRN    pantoprazole (PROTONIX) EC tablet 40 mg  40 mg Oral Early Morning    polyethylene glycol (MIRALAX) packet 17 g  17 g Oral Daily    prazosin (MINIPRESS) capsule 2 mg  2 mg Oral Daily    pregabalin (LYRICA) capsule 225 mg  225 mg Oral Q12H [address]    propranolol (INDERAL) tablet 20 mg  20 mg Oral BID    QUEtiapine (SEROquel) tablet 300 mg  300 mg Oral HS    senna-docusate sodium (SENOKOT S) 8 6-50 mg per tablet 1 tablet  1 tablet Oral HS    sodium chloride 0 9 % infusion  20 mL/hr Intravenous Continuous PRN    Valbenazine Tosylate CAPS 80 mg  80 mg Oral Daily       VTE Pharmacologic Prophylaxis: Heparin  VTE Mechanical Prophylaxis: sequential compression device    Portions of the record may have been created with voice recognition software  Occasional wrong word or "sound a like" substitutions may have occurred due to the inherent limitations of voice recognition software    Read the chart carefully and recognize, using context, where substitutions have occurred   ==  Jane Gary, [address] [place] [address] Medicine Residency PGY-3

## 2021-05-05 NOTE — ED PROVIDER NOTES
History  Chief Complaint   Patient presents with    Foot Pain     pt presents to ed for right foot injury after falling  -head injury, -loc  -bt  no other complaints       This is a 75-year-old female presents the emergency department complaining of right foot pain  The patient states that she tripped and got her foot caught in the ground  She complains of pain to the right foot  She states she was able to walk, but it hurt severely with walking  The patient states the pain is sharp and severe across her entire foot  Nothing makes the pain better  She did not take any medication prior to arrival   She denies fevers or chills  She denies hitting her head  She denies head pain or neck pain  She denies loss of consciousness  Prior to Admission Medications   Prescriptions Last Dose Informant Patient Reported? Taking?    [facility] Vitamin C 500 MG tablet   No No   Sig: TAKE 1 TABLET BY MOUTH TWICE A DAY   DULoxetine (CYMBALTA) 60 mg delayed release capsule   No No   Sig: TAKE 1 CAPSULE BY MOUTH EVERY DAY   Diapers & Supplies (Huggies Pull-Ups) MISC   No No   Sig: Use 3 (three) times a day   LORazepam (ATIVAN) 0 5 mg tablet   No No   Sig: Take 1 tablet (0 5 mg total) by mouth every 8 (eight) hours as needed for anxiety for up to 10 days Hold if sedated   Patient not taking: Reported on 11/27/2020   Mouthwashes (Biotene Dry Mouth) LIQD   Yes No   Sig: Apply 5 mL to the mouth or throat daily   Multiple Vitamins-Minerals (multivitamin with minerals) tablet  Outside Facility (Specify) No No   Sig: Take 1 tablet by mouth daily   Polyethylene Glycol 3350 (MIRALAX PO)  Outside Facility (Specify) Yes No   Sig: Take by mouth   QUEtiapine (SEROquel) 300 mg tablet  Self Yes No   Sig: Take 300 mg by mouth daily at bedtime   Respiratory Therapy Supplies (Nebulizer) YUDY   No No   Sig: Use as needed (Shortness of breath)   Patient not taking: Reported on 4/20/2021   SIMETHICONE PO  Outside Facility (Specify) Yes No   Sig: Take by mouth as needed    Sennosides (SENEXON PO)  Outside Facility (Specify) Yes No   Sig: Take by mouth   Valbenazine Tosylate (Ingrezza) 80 MG CAPS   No No   Sig: Take 80 mg by mouth daily   acetaminophen (TYLENOL) 325 mg tablet   No No   Sig: Take 2 tablets (650 mg total) by mouth every 8 (eight) hours as needed for mild pain   amLODIPine (NORVASC) 5 mg tablet  Outside Facility (Specify) No No   Sig: Take 1 tablet (5 mg total) by mouth daily   bisacodyl (DULCOLAX) 5 mg EC tablet  Outside Facility (Specify) Yes No   Sig: Take 5 mg by mouth daily as needed for constipation   busPIRone (BUSPAR) 15 mg tablet   No No   Sig: Take 1 tablet (15 mg total) by mouth 3 (three) times a day   cetirizine (ZyrTEC) 10 mg tablet  Outside Facility (Specify) Yes No   Sig: Take 10 mg by mouth as needed    cholecalciferol (VITAMIN D3) 1,000 units tablet  Outside Facility (Specify) No No   Sig: Take 1 tablet (1,000 Units total) by mouth daily   diclofenac sodium (VOLTAREN) 1 %  Outside Facility (Specify) No No   Sig: APPLY 4 G TOPICALLY 4 (FOUR) TIMES A DAY   Patient taking differently: Apply 4 g topically 2 (two) times a day    ferrous sulfate 324 (65 Fe) mg   No No   Sig: TAKE 1 TABLET (324 MG TOTAL) BY MOUTH 2 (TWO) TIMES A DAY BEFORE MEALS   folic acid (FOLVITE) 1 mg tablet   No No   Sig: TAKE 1 TABLET BY MOUTH EVERY DAY   hydrOXYzine HCL (ATARAX) 50 mg tablet   No No   Sig: Take 1 tablet (50 mg total) by mouth daily at bedtime as needed for anxiety (insomnia)   ipratropium-albuterol (DUO-NEB) 0 5-2 5 mg/3 mL nebulizer solution   No No   Sig: Take 1 vial (3 mL total) by nebulization every 6 (six) hours as needed for wheezing or shortness of breath   Patient not taking: Reported on 4/20/2021   lidocaine (LMX) 4 % cream  Outside Facility (Specify) No No   Sig: Apply topically as needed for mild pain   Patient not taking: Reported on 2/24/2021   morphine (MS CONTIN) 15 mg 12 hr tablet   No No   Sig: Take 1 tablet (15 mg total) by mouth 2 (two) times a day Hold if sedatedMax Daily Amount: 30 mg   naloxone (NARCAN) 4 mg/0 1 mL nasal spray   No No   Sig: Administer 1 spray into a nostril  If no response after 2-3 minutes, give another dose in the other nostril using a new spray     nortriptyline (PAMELOR) 10 mg capsule   No No   Sig: Take 1 capsule (10 mg total) by mouth daily at bedtime   omeprazole (PriLOSEC) 20 mg delayed release capsule   No No   Sig: Take 1 capsule (20 mg total) by mouth daily   ondansetron (ZOFRAN-ODT) 4 mg disintegrating tablet  Outside Facility (Specify) No No   Sig: Take 1 tablet (4 mg total) by mouth every 6 (six) hours as needed for nausea or vomiting   Patient not taking: Reported on 2021   oxyCODONE (ROXICODONE) 5 mg immediate release tablet   No No   Sig: Take 1 tablet (5 mg total) by mouth dailyMax Daily Amount: 5 mg   oxybutynin (DITROPAN) 5 mg tablet   No No   Sig: Take 1 tablet (5 mg total) by mouth 2 (two) times a day   potassium chloride (K-DUR,KLOR-CON) 10 mEq tablet   No No   Sig: Take 2 tablets (20 mEq total) by mouth 2 (two) times a day for 2 days   potassium chloride (MICRO-K) 10 MEQ CR capsule  Self Yes No   Sig: Take 10 mEq by mouth 2 (two) times a day Two tabs, two times daily   prazosin (MINIPRESS) 2 mg capsule  Outside Facility (Specify) No No   Sig: TAKE 1 CAPSULE BY MOUTH EVERY DAY   pregabalin (LYRICA) 150 mg capsule  Outside Facility (Specify) No No   Sig: Take 1 capsule (150 mg total) by mouth 3 (three) times a day   Patient not taking: Reported on 2021   pregabalin (LYRICA) 225 MG capsule   No No   Sig: TAKE 1 CAPSULE (225 MG TOTAL) BY MOUTH EVERY 12 (TWELVE) HOURS   propranolol (INDERAL) 20 mg tablet   No No   Sig: Take 1 tablet (20 mg total) by mouth 2 (two) times a day   sodium chloride (OCEAN) 0 65 % nasal spray   Yes No   Si sprays into each nostril as needed   traZODone (DESYREL) 100 mg tablet  Self Yes No   Sig: Take 100 mg by mouth daily at bedtime 2 tablets daily at bedtime   triamcinolone (KENALOG) 0 025 % cream  Outside Facility (Specify) No No   Sig: Apply topically 2 (two) times a day   Patient not taking: Reported on 2021      Facility-Administered Medications: None       Past Medical History:   Diagnosis Date    Acid reflux     Anxiety     RESOLVED: [id number]    Arthritis     Bipolar 2 disorder (HCC)     FOLLOWS WITH PSYCHIATRIST  CONTINUE LAMOTRIGINE; RESOLVED: [id number]    Depression     Familial tremor     both hands    Fibromyalgia     LAST ASSESSED: [id number]    Hearing aid worn     left ear    [place] (hard of hearing)     left ear    Hypertension     Left-sided weakness     Lower back pain     Memory loss of unknown cause     long and short term    Migraine     Obesity     Overactive bladder     Panic attack     Post traumatic stress disorder     Seasonal allergies     Stroke [facility])     questionable stroke     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    CONT ASA 81 MG DAILY; RESOLVED: [id number]    Urinary incontinence     Wears dentures     partial lower / full upper    Wears glasses        Past Surgical History:   Procedure Laterality Date    BACK SURGERY       SECTION      COLONOSCOPY      RESOLVED: [id number]    EAR SURGERY      EGD      HYSTERECTOMY      MYRINGOTOMY W/ TUBES Left     NECK SURGERY  2019    WI CYSTOURETHROSCOPY N/A 2016    Procedure: CYSTOSCOPY, BOTOX INJECTION;  Surgeon: Tony Meneses MD;  Location: [facility];  Service: Gynecology    WI IMPLANT SPINAL NEUROSTIM/ Right 2/10/2021    Procedure: REPLACEMENT IMPLANTABLE PULSE GENERATOR DORSAL SPINAL COLUMN STIMULATOR, RIGHT;  Surgeon: Jsoe Weaver MD;  Location: [facility];  Service: Neurosurgery    WI PERCUT IMPLNT [place] Right 2020    Procedure: INSERTION THORACIC DORSAL COLUMN SPINAL CORD STIMULATOR PERCUTANEOUS W IMPLANTABLE PULSE GENERATOR, RIGHT;  Surgeon: Jose Weaver MD; Location:  MAIN OR;  Service: Neurosurgery    TONSILLECTOMY      TUBAL LIGATION  1986    UPPER GASTROINTESTINAL ENDOSCOPY  09/2020       Family History   Problem Relation Age of Onset    Colon cancer Mother     Alzheimer's disease Father     Stroke Father     Colon cancer Brother     Bipolar disorder Brother     Breast cancer Maternal Aunt     Colon cancer Maternal Aunt     Heart disease Other     Diabetes Other     Hypertension Other     Seizures Son     Depression Paternal Grandfather     No Known Problems Sister     No Known Problems Brother     Thyroid disease Neg Hx      I have reviewed and agree with the history as documented  E-Cigarette/Vaping    E-Cigarette Use Never User      E-Cigarette/Vaping Substances    Nicotine No     THC No     CBD No     Flavoring No     Other No     Unknown No      Social History     Tobacco Use    Smoking status: Never Smoker    Smokeless tobacco: Never Used   Substance Use Topics    Alcohol use: Not Currently     Comment: 2 x year; being a social drinker as per all scripts     Drug use: No       Review of Systems   All other systems reviewed and are negative        Physical Exam  Physical Exam  Constitutional: Vital signs reviewed, patient well-appearing, nontoxic  Eyes: Extraocular movements intact   HEENT: Trachea midline, no JVD, moist mucous membranes , atraumatic, normocephalic, no C-spine tenderness   Respiratory: Equal chest expansion   Cardiovascular: Well perfused   Abdomen: No distension   Extremities: No edema, tenderness across the right forefoot, pulse motor and sensation are intact distal to the tenderness   Neuro: awake, alert, oriented, no focal weakness   Skin: warm, dry, intact, no rashes noted     Vital Signs  ED Triage Vitals [05/01/21 1336]   Temperature Pulse Respirations Blood Pressure SpO2   98 7 °F (37 1 °C) 103 18 127/78 99 %      Temp Source Heart Rate Source Patient Position - Orthostatic VS BP Location FiO2 (%)   Oral Monitor -- Left arm --      Pain Score       9           Vitals:    05/01/21 1336   BP: 127/78   Pulse: 103         Visual Acuity      ED Medications  Medications   ibuprofen (MOTRIN) tablet 600 mg (600 mg Oral Given 5/1/21 1354)       Diagnostic Studies  Results Reviewed     None                 XR foot 3+ views RIGHT   ED Interpretation by Yue Gleason DO (05/01 1425)   + proximal metatarsal fractures       Final Result by Iveth Mora, DO (05/01 1935)      Nondisplaced fracture 2nd and 4th metatarsal and possibly involving the base of the 3rd metatarsal             Workstation performed: [id number]                    Procedures  Procedures         ED Course                                           MDM  Number of Diagnoses or Management Options  Metatarsal fracture:   Diagnosis management comments: This is a 68-year-old female presented to the emergency department complaining of right foot pain after a fall  I considered fracture, sprain, strain  These and other diagnoses were considered  the patient had an x-ray of her right foot that was interpreted by me that showed a fracture of her 2nd and 4th metatarsals  I question a fracture in the 3rd  The patient was given a walking shoe and has a walker that she walks on at baseline  The patient will be discharged with follow-up to her orthopedics         Amount and/or Complexity of Data Reviewed  Tests in the radiology section of CPT®: reviewed  Independent visualization of images, tracings, or specimens: yes        Disposition  Final diagnoses:   Metatarsal fracture     Time reflects when diagnosis was documented in both MDM as applicable and the Disposition within this note     Time User Action Codes Description Comment    5/1/2021  2:26 PM Arcenio Leos Add [W01 288B] Metatarsal fracture       ED Disposition     ED Disposition Condition Date/Time Comment    Discharge Stable Sat May 1, 2021  2:27 PM Samantha Rodriguez discharge to home/self care             Follow-up Information     Follow up With Specialties Details Why Contact Info Additional Information    [address] [facility]   [address] [address], [facility] [address] [facility] [id number] [phone] [address] [facility] [facility], [address] [facility], [facility] [address] [place], [place], [address] [place] [address]          Discharge Medication List as of 5/1/2021  2:27 PM      CONTINUE these medications which have NOT CHANGED    Details   acetaminophen (TYLENOL) 325 mg tablet Take 2 tablets (650 mg total) by mouth every 8 (eight) hours as needed for mild pain, Starting Tue 3/9/2021, Normal      amLODIPine (NORVASC) 5 mg tablet Take 1 tablet (5 mg total) by mouth daily, Starting Wed 3/25/2020, Until Tue 4/20/2021, Normal      bisacodyl (DULCOLAX) 5 mg EC tablet Take 5 mg by mouth daily as needed for constipation, Historical Med      busPIRone (BUSPAR) 15 mg tablet Take 1 tablet (15 mg total) by mouth 3 (three) times a day, Starting Wed 3/17/2021, Until Tue 4/20/2021, Normal      cetirizine (ZyrTEC) 10 mg tablet Take 10 mg by mouth as needed , Starting Thu 11/19/2020, Historical Med      cholecalciferol (VITAMIN D3) 1,000 units tablet Take 1 tablet (1,000 Units total) by mouth daily, Starting Tue 8/4/2020, Until Wed 2/24/2021, Normal      [facility]S Vitamin C 500 MG tablet TAKE 1 TABLET BY MOUTH TWICE A DAY, Normal      Diapers & Supplies (Huggies Pull-Ups) MISC Use 3 (three) times a day, Starting Tue 4/20/2021, Normal      diclofenac sodium (VOLTAREN) 1 % APPLY 4 G TOPICALLY 4 (FOUR) TIMES A DAY, Starting Wed 8/5/2020, Normal      DULoxetine (CYMBALTA) 60 mg delayed release capsule TAKE 1 CAPSULE BY MOUTH EVERY DAY, Normal      ferrous sulfate 324 (65 Fe) mg TAKE 1 TABLET (324 MG TOTAL) BY MOUTH 2 (TWO) TIMES A DAY BEFORE MEALS, Starting Tue 7/07/2835, Normal      folic acid (FOLVITE) 1 mg tablet TAKE 1 TABLET BY MOUTH EVERY DAY, Normal      hydrOXYzine HCL (ATARAX) 50 mg tablet Take 1 tablet (50 mg total) by mouth daily at bedtime as needed for anxiety (insomnia), Starting Wed 3/17/2021, Normal      ipratropium-albuterol (DUO-NEB) 0 5-2 5 mg/3 mL nebulizer solution Take 1 vial (3 mL total) by nebulization every 6 (six) hours as needed for wheezing or shortness of breath, Starting Mon 3/29/2021, Print      lidocaine (LMX) 4 % cream Apply topically as needed for mild pain, Starting Thu 7/16/2020, Normal      LORazepam (ATIVAN) 0 5 mg tablet Take 1 tablet (0 5 mg total) by mouth every 8 (eight) hours as needed for anxiety for up to 10 days Hold if sedated, Starting Wed 9/9/2020, Until Sat 9/19/2020, Print      morphine (MS CONTIN) 15 mg 12 hr tablet Take 1 tablet (15 mg total) by mouth 2 (two) times a day Hold if sedatedMax Daily Amount: 30 mg, Starting Tue 4/20/2021, Normal      Mouthwashes (Biotene Dry Mouth) LIQD Apply 5 mL to the mouth or throat daily, Historical Med      Multiple Vitamins-Minerals (multivitamin with minerals) tablet Take 1 tablet by mouth daily, Starting Tue 8/18/2020, Normal      naloxone (NARCAN) 4 mg/0 1 mL nasal spray Administer 1 spray into a nostril   If no response after 2-3 minutes, give another dose in the other nostril using a new spray , Normal      nortriptyline (PAMELOR) 10 mg capsule Take 1 capsule (10 mg total) by mouth daily at bedtime, Starting Fri 4/16/2021, Normal      omeprazole (PriLOSEC) 20 mg delayed release capsule Take 1 capsule (20 mg total) by mouth daily, Starting Wed 3/17/2021, Until Tue 6/15/2021, Normal      ondansetron (ZOFRAN-ODT) 4 mg disintegrating tablet Take 1 tablet (4 mg total) by mouth every 6 (six) hours as needed for nausea or vomiting, Starting Thu 9/17/2020, Print      oxybutynin (DITROPAN) 5 mg tablet Take 1 tablet (5 mg total) by mouth 2 (two) times a day, Starting Wed 3/17/2021, Until Tue 6/15/2021, Normal      oxyCODONE (ROXICODONE) 5 mg immediate release tablet Take 1 tablet (5 mg total) by mouth dailyMax Daily Amount: 5 mg, Starting Tue 4/20/2021, Normal      Polyethylene Glycol 3350 (MIRALAX PO) Take by mouth, Historical Med      potassium chloride (K-DUR,KLOR-CON) 10 mEq tablet Take 2 tablets (20 mEq total) by mouth 2 (two) times a day for 2 days, Starting Tue 4/13/2021, Until Thu 4/15/2021, Normal      potassium chloride (MICRO-K) 10 MEQ CR capsule Take 10 mEq by mouth 2 (two) times a day Two tabs, two times daily, Historical Med      prazosin (MINIPRESS) 2 mg capsule TAKE 1 CAPSULE BY MOUTH EVERY DAY, Normal      !! pregabalin (LYRICA) 150 mg capsule Take 1 capsule (150 mg total) by mouth 3 (three) times a day, Starting Wed 6/17/2020, Normal      !! pregabalin (LYRICA) 225 MG capsule TAKE 1 CAPSULE (225 MG TOTAL) BY MOUTH EVERY 12 (TWELVE) HOURS, Starting Thu 4/29/2021, Normal      propranolol (INDERAL) 20 mg tablet Take 1 tablet (20 mg total) by mouth 2 (two) times a day, Starting Wed 3/17/2021, Until Tue 6/15/2021, Normal      QUEtiapine (SEROquel) 300 mg tablet Take 300 mg by mouth daily at bedtime, Historical Med      Respiratory Therapy Supplies (Nebulizer) YUDY Use as needed (Shortness of breath), Starting Mon 3/29/2021, Print      Sennosides (SENEXON PO) Take by mouth, Historical Med      SIMETHICONE PO Take by mouth as needed , Historical Med      sodium chloride (OCEAN) 0 65 % nasal spray 2 sprays into each nostril as needed, Historical Med      traZODone (DESYREL) 100 mg tablet Take 100 mg by mouth daily at bedtime 2 tablets daily at bedtime, Historical Med      triamcinolone (KENALOG) 0 025 % cream Apply topically 2 (two) times a day, Starting Thu 7/16/2020, Normal      Valbenazine Tosylate (Ingrezza) 80 MG CAPS Take 80 mg by mouth daily, Starting Wed 3/17/2021, Normal       !! - Potential duplicate medications found  Please discuss with provider  No discharge procedures on file      PDMP Review       Value Time User    PDMP Reviewed  Yes 9/5/2020 12:53 PM Marylen Kill, MD          ED Provider  Electronically Signed by           Blayne Branch DO  05/05/21 4167

## 2021-05-05 NOTE — CASE MANAGEMENT
CM TC to pt's OP CM, Lauryn Medina who reported that she is following the patient  Zia Laurala also reported that the pt is currently open with  Behavioral Health  Pt's CM [facility] is Dai Kenyon ([phone]) who is reported to be working with the pt to obtain an ICM  Cm TC to Dai Kenyon, no answer received, voice message left  CM inquired about any definitive IP psych stays over the course of the last 2 years  Cm was informed of an IP psych stay in 2015 at [facility]  No other IP stays were reported  CM TC to Aleksandra with  [employer] to inquire about any information the county may have from pt's previous determination  Voice message left  Cm will continue to follow

## 2021-05-05 NOTE — PROGRESS NOTES
Podiatry - Progress Note  Patient: April Alas 60 y o  female   MRN: [id number]  PCP: Matt Cesar MD  Unit/Bed#: CW2 218-01 Encounter: [id number]  Date Of Visit: 21    ASSESSMENT:    Melody D Sylvester Cheadle is a 60 y o  female with:    1  Right foot fractures of metatarsals 2-4  2  Fibromyalgia  3  Anxiety  4  Tardive dyskinesia  5  Opioid dependence    PLAN:    · RLE CT reviewed by myself: No apparent injury to patient's Lisfranc ligament  Mildly displaced, extra-articular fractures of MT bases 2-4  · No need for podiatric surgical intervention at this time  · Patient is to remain NWB to RLE with posterior splint  · Podiatry signing off at this time, thank you for the consult  Recommend close outpatient follow-up with Dr Wallace December  · Elevation on green foam wedges or pillows when non-ambulatory  · Rest of care per primary team      Weightbearing status: NWB to RLE    SUBJECTIVE:     The patient was seen, evaluated, and assessed at bedside today  The patient was awake, alert, and in no acute distress  No acute events overnight  The patient reports mild pain to her RLE when it is in the dependant position  Patient denies N/V/F/chills/SOB/CP  OBJECTIVE:     Vitals:   /85   Pulse 67   Temp 98 4 °F (36 9 °C)   Resp 18   SpO2 94%     Temp (24hrs), Av 9 °F (37 2 °C), Min:98 4 °F (36 9 °C), Max:99 4 °F (37 4 °C)      Physical Exam:     General: Alert, cooperative and no distress  Lungs: Non labored breathing  Abdomen: Soft, non-tender  Lower extremity exam:  Cardiovascular status at baseline  Neurological status at baseline  Musculoskeletal status at baseline  No calf tenderness noted  Dressings to right foot clean, dry, and intact  Posterior splint in proper position       Additional Data:     Labs:    Results from last 7 days   Lab Units 21  0639  21   WBC Thousand/uL 4 45   < > 8 51   HEMOGLOBIN g/dL 12 4   < > 14 6   HEMATOCRIT % 38 7   < > 42 4   PLATELETS Thousands/uL 251   < > 318   NEUTROS PCT %  --   --  78*   LYMPHS PCT %  --   --  12*   MONOS PCT %  --   --  8   EOS PCT %  --   --  0    < > = values in this interval not displayed  Results from last 7 days   Lab Units 05/05/21  0639   POTASSIUM mmol/L 3 9   CHLORIDE mmol/L 110*   CO2 mmol/L 27   BUN mg/dL 15   CREATININE mg/dL 0 75   CALCIUM mg/dL 8 4           * I Have Reviewed All Lab Data Listed Above  Recent Cultures (last 7 days):               Imaging: I have personally reviewed pertinent films in PACS  EKG, Pathology, and Other Studies: I have personally reviewed pertinent reports  ** Please Note: Portions of the record may have been created with voice recognition software  Occasional wrong word or "sound a like" substitutions may have occurred due to the inherent limitations of voice recognition software  Read the chart carefully and recognize, using context, where substitutions have occurred   **

## 2021-05-06 ENCOUNTER — PATIENT OUTREACH (OUTPATIENT)
Dept: INTERNAL MEDICINE CLINIC | Facility: CLINIC | Age: 56
End: 2021-05-06

## 2021-05-06 LAB
ANION GAP SERPL CALCULATED.3IONS-SCNC: 5 MMOL/L (ref 4–13)
BUN SERPL-MCNC: 15 MG/DL (ref 5–25)
CALCIUM SERPL-MCNC: 8.7 MG/DL (ref 8.3–10.1)
CHLORIDE SERPL-SCNC: 110 MMOL/L (ref 100–108)
CO2 SERPL-SCNC: 27 MMOL/L (ref 21–32)
CREAT SERPL-MCNC: 0.69 MG/DL (ref 0.6–1.3)
GFR SERPL CREATININE-BSD FRML MDRD: 112 ML/MIN/1.73SQ M
GLUCOSE SERPL-MCNC: 79 MG/DL (ref 65–140)
POTASSIUM SERPL-SCNC: 4.2 MMOL/L (ref 3.5–5.3)
SARS-COV-2 RNA RESP QL NAA+PROBE: NEGATIVE
SODIUM SERPL-SCNC: 142 MMOL/L (ref 136–145)

## 2021-05-06 PROCEDURE — U0003 INFECTIOUS AGENT DETECTION BY NUCLEIC ACID (DNA OR RNA); SEVERE ACUTE RESPIRATORY SYNDROME CORONAVIRUS 2 (SARS-COV-2) (CORONAVIRUS DISEASE [COVID-19]), AMPLIFIED PROBE TECHNIQUE, MAKING USE OF HIGH THROUGHPUT TECHNOLOGIES AS DESCRIBED BY CMS-2020-01-R: HCPCS | Performed by: INTERNAL MEDICINE

## 2021-05-06 PROCEDURE — U0005 INFEC AGEN DETEC AMPLI PROBE: HCPCS | Performed by: INTERNAL MEDICINE

## 2021-05-06 PROCEDURE — 80048 BASIC METABOLIC PNL TOTAL CA: CPT | Performed by: INTERNAL MEDICINE

## 2021-05-06 RX ORDER — ACETAMINOPHEN 325 MG/1
975 TABLET ORAL EVERY 8 HOURS SCHEDULED
Status: DISCONTINUED | OUTPATIENT
Start: 2021-05-06 | End: 2021-05-17 | Stop reason: HOSPADM

## 2021-05-06 RX ORDER — KETOROLAC TROMETHAMINE 10 MG/1
10 TABLET, FILM COATED ORAL EVERY 6 HOURS PRN
Status: DISCONTINUED | OUTPATIENT
Start: 2021-05-06 | End: 2021-05-07

## 2021-05-06 RX ORDER — OXYCODONE HYDROCHLORIDE 5 MG/1
5 TABLET ORAL 2 TIMES DAILY PRN
Status: DISCONTINUED | OUTPATIENT
Start: 2021-05-06 | End: 2021-05-07

## 2021-05-06 RX ADMIN — PREGABALIN 25 MG: 25 CAPSULE ORAL at 22:42

## 2021-05-06 RX ADMIN — OXYBUTYNIN CHLORIDE 5 MG: 5 TABLET ORAL at 09:37

## 2021-05-06 RX ADMIN — PREGABALIN 200 MG: 100 CAPSULE ORAL at 22:39

## 2021-05-06 RX ADMIN — DICLOFENAC SODIUM 2 G: 10 GEL TOPICAL at 11:56

## 2021-05-06 RX ADMIN — MORPHINE SULFATE 15 MG: 15 TABLET, FILM COATED, EXTENDED RELEASE ORAL at 22:39

## 2021-05-06 RX ADMIN — ACETAMINOPHEN 975 MG: 325 TABLET ORAL at 00:20

## 2021-05-06 RX ADMIN — PROPRANOLOL HYDROCHLORIDE 20 MG: 20 TABLET ORAL at 09:39

## 2021-05-06 RX ADMIN — QUETIAPINE FUMARATE 300 MG: 100 TABLET ORAL at 22:40

## 2021-05-06 RX ADMIN — DICLOFENAC SODIUM 2 G: 10 GEL TOPICAL at 09:24

## 2021-05-06 RX ADMIN — BUSPIRONE HYDROCHLORIDE 15 MG: 10 TABLET ORAL at 17:01

## 2021-05-06 RX ADMIN — NORTRIPTYLINE HYDROCHLORIDE 10 MG: 10 CAPSULE ORAL at 22:39

## 2021-05-06 RX ADMIN — BUSPIRONE HYDROCHLORIDE 15 MG: 10 TABLET ORAL at 22:40

## 2021-05-06 RX ADMIN — MORPHINE SULFATE 15 MG: 15 TABLET, FILM COATED, EXTENDED RELEASE ORAL at 09:37

## 2021-05-06 RX ADMIN — PREGABALIN 200 MG: 100 CAPSULE ORAL at 09:38

## 2021-05-06 RX ADMIN — AMLODIPINE BESYLATE 5 MG: 5 TABLET ORAL at 09:38

## 2021-05-06 RX ADMIN — ACETAMINOPHEN 975 MG: 325 TABLET ORAL at 14:08

## 2021-05-06 RX ADMIN — DOCUSATE SODIUM AND SENNOSIDES 1 TABLET: 8.6; 5 TABLET ORAL at 22:40

## 2021-05-06 RX ADMIN — BUSPIRONE HYDROCHLORIDE 15 MG: 10 TABLET ORAL at 09:37

## 2021-05-06 RX ADMIN — HEPARIN SODIUM 7500 UNITS: 5000 INJECTION INTRAVENOUS; SUBCUTANEOUS at 22:43

## 2021-05-06 RX ADMIN — POLYETHYLENE GLYCOL 3350 17 G: 17 POWDER, FOR SOLUTION ORAL at 09:39

## 2021-05-06 RX ADMIN — ACETAMINOPHEN 975 MG: 325 TABLET ORAL at 06:17

## 2021-05-06 RX ADMIN — PANTOPRAZOLE SODIUM 40 MG: 40 TABLET, DELAYED RELEASE ORAL at 06:17

## 2021-05-06 RX ADMIN — PREGABALIN 25 MG: 25 CAPSULE ORAL at 09:38

## 2021-05-06 RX ADMIN — PROPRANOLOL HYDROCHLORIDE 20 MG: 20 TABLET ORAL at 17:02

## 2021-05-06 RX ADMIN — PRAZOSIN HYDROCHLORIDE 2 MG: 2 CAPSULE ORAL at 09:39

## 2021-05-06 RX ADMIN — HEPARIN SODIUM 7500 UNITS: 5000 INJECTION INTRAVENOUS; SUBCUTANEOUS at 06:17

## 2021-05-06 RX ADMIN — ACETAMINOPHEN 975 MG: 325 TABLET ORAL at 22:40

## 2021-05-06 RX ADMIN — DICLOFENAC SODIUM 2 G: 10 GEL TOPICAL at 17:02

## 2021-05-06 RX ADMIN — Medication 1000 UNITS: at 09:37

## 2021-05-06 RX ADMIN — DULOXETINE HYDROCHLORIDE 60 MG: 60 CAPSULE, DELAYED RELEASE ORAL at 09:38

## 2021-05-06 RX ADMIN — HEPARIN SODIUM 7500 UNITS: 5000 INJECTION INTRAVENOUS; SUBCUTANEOUS at 14:08

## 2021-05-06 RX ADMIN — OXYCODONE HYDROCHLORIDE 5 MG: 5 TABLET ORAL at 08:15

## 2021-05-06 RX ADMIN — OXYBUTYNIN CHLORIDE 5 MG: 5 TABLET ORAL at 17:01

## 2021-05-06 NOTE — PLAN OF CARE
Problem: PAIN - ADULT  Goal: Verbalizes/displays adequate comfort level or baseline comfort level  Description: Interventions:  - Encourage patient to monitor pain and request assistance  - Assess pain using appropriate pain scale  - Administer analgesics based on type and severity of pain and evaluate response  - Implement non-pharmacological measures as appropriate and evaluate response  - Consider cultural and social influences on pain and pain management  - Notify physician/advanced practitioner if interventions unsuccessful or patient reports new pain  Outcome: Progressing     Problem: INFECTION - ADULT  Goal: Absence or prevention of progression during hospitalization  Description: INTERVENTIONS:  - Assess and monitor for signs and symptoms of infection  - Monitor lab/diagnostic results  - Monitor all insertion sites, i e  indwelling lines, tubes, and drains  - Monitor endotracheal if appropriate and nasal secretions for changes in amount and color  - [place] appropriate cooling/warming therapies per order  - Administer medications as ordered  - Instruct and encourage patient and family to use good hand hygiene technique  - Identify and instruct in appropriate isolation precautions for identified infection/condition  Outcome: Progressing  Goal: Absence of fever/infection during neutropenic period  Description: INTERVENTIONS:  - Monitor WBC    Outcome: Progressing     Problem: SAFETY ADULT  Goal: Patient will remain free of falls  Description: INTERVENTIONS:  - Assess patient frequently for physical needs  -  Identify cognitive and physical deficits and behaviors that affect risk of falls    -  [place] fall precautions as indicated by assessment   - Educate patient/family on patient safety including physical limitations  - Instruct patient to call for assistance with activity based on assessment  - Modify environment to reduce risk of injury  - Consider OT/PT consult to assist with strengthening/mobility  Outcome: Progressing  Goal: Maintain or return to baseline ADL function  Description: INTERVENTIONS:  -  Assess patient's ability to carry out ADLs; assess patient's baseline for ADL function and identify physical deficits which impact ability to perform ADLs (bathing, care of mouth/teeth, toileting, grooming, dressing, etc )  - Assess/evaluate cause of self-care deficits   - Assess range of motion  - Assess patient's mobility; develop plan if impaired  - Assess patient's need for assistive devices and provide as appropriate  - Encourage maximum independence but intervene and supervise when necessary  - Involve family in performance of ADLs  - Assess for home care needs following discharge   - Consider OT consult to assist with ADL evaluation and planning for discharge  - Provide patient education as appropriate  Outcome: Progressing  Goal: Maintain or return mobility status to optimal level  Description: INTERVENTIONS:  - Assess patient's baseline mobility status (ambulation, transfers, stairs, etc )    - Identify cognitive and physical deficits and behaviors that affect mobility  - Identify mobility aids required to assist with transfers and/or ambulation (gait belt, sit-to-stand, lift, walker, cane, etc )  - [place] fall precautions as indicated by assessment  - Record patient progress and toleration of activity level on Mobility SBAR; progress patient to next Phase/Stage  - Instruct patient to call for assistance with activity based on assessment  - Consider rehabilitation consult to assist with strengthening/weightbearing, etc   Outcome: Progressing     Problem: DISCHARGE PLANNING  Goal: Discharge to home or other facility with appropriate resources  Description: INTERVENTIONS:  - Identify barriers to discharge w/patient and caregiver  - Arrange for needed discharge resources and transportation as appropriate  - Identify discharge learning needs (meds, wound care, etc )  - Arrange for interpretive services to assist at discharge as needed  - Refer to Case Management Department for coordinating discharge planning if the patient needs post-hospital services based on physician/advanced practitioner order or complex needs related to functional status, cognitive ability, or social support system  Outcome: Progressing     Problem: Knowledge Deficit  Goal: Patient/family/caregiver demonstrates understanding of disease process, treatment plan, medications, and discharge instructions  Description: Complete learning assessment and assess knowledge base  Interventions:  - Provide teaching at level of understanding  - Provide teaching via preferred learning methods  Outcome: Progressing     Problem: Potential for Falls  Goal: Patient will remain free of falls  Description: INTERVENTIONS:  - Assess patient frequently for physical needs  -  Identify cognitive and physical deficits and behaviors that affect risk of falls    -  [place] fall precautions as indicated by assessment   - Educate patient/family on patient safety including physical limitations  - Instruct patient to call for assistance with activity based on assessment  - Modify environment to reduce risk of injury  - Consider OT/PT consult to assist with strengthening/mobility  Outcome: Progressing     Problem: Prexisting or High Potential for Compromised Skin Integrity  Goal: Skin integrity is maintained or improved  Description: INTERVENTIONS:  - Identify patients at risk for skin breakdown  - Assess and monitor skin integrity  - Assess and monitor nutrition and hydration status  - Monitor labs   - Assess for incontinence   - Turn and reposition patient  - Assist with mobility/ambulation  - Relieve pressure over bony prominences  - Avoid friction and shearing  - Provide appropriate hygiene as needed including keeping skin clean and dry  - Evaluate need for skin moisturizer/barrier cream  - Collaborate with interdisciplinary team   - Patient/family teaching  - Consider wound care consult   Outcome: Progressing

## 2021-05-06 NOTE — PROGRESS NOTES
INTERNAL MEDICINE RESIDENCY PROGRESS NOTE     Name: Shanice Peña   Age & Sex: 60 y o  female   MRN: [id number]  Unit/Bed#: CW2 218-01   Encounter: [id number]  Team: SOD Team B     PATIENT INFORMATION     Name: Shanice Peña   Age & Sex: 60 y o  female   MRN: [id number]  Hospital Stay Days: 4    ASSESSMENT/PLAN     Principal Problem:    Ambulatory dysfunction  Active Problems:    Fibromyalgia    Bipolar II disorder ([facility])    Generalized anxiety disorder    Hypertension    Insomnia    Migraine    Opioid dependence ([facility] [address] )    Left hip pain    Post traumatic stress disorder    Metatarsal fracture    Chronic back pain      Metatarsal fracture  Assessment & Plan  Mechanical fall 2 days ago, imaging at [facility] ER showed 2nd, 3rd, 4th non displaced fracture of metatarsals  Patient has been bearing weight on the right foot in ambulating around her house, this morning she reports the pain was too severe and she came into the ED, patient is requesting  Placement into a nursing facility     Xray obtained in ER reviewed: Unchanged nondisplaced fractures at the bases of the 2nd and 4th metatarsals  Small intra-articular fracture at the base of the 3rd metatarsal   No significant widening between the 1st and 2nd metatarsals to suggest Lisfranc injury  - podiatry consulted - no surgical intervention indicated  - NWB to RLE  - pain management with home Lyrica, morphine, Toradol 10mg PO for moderate pain (5 days), oxycodone 5mg BID PRN for severe pain  - rehab placement pending    Left hip pain  Assessment & Plan  History of bilateral hip OA  Patient reports worsening of chronic left hip pain since her fall  Notes pain is worse with laying on left side  Associated numbness and tingling on anterolateral thigh  No groin pain  ROM intact, no pain with internal or external rotation, though difficulty weight bearing per RN    - suspect trochanteric bursitis and possibly concurrent meralgia paresthetica based on exam and description  - doubt acute joint pathology at this time  - added back Toradol now that CALE resolved  - continue home pregabalin    Opioid dependence ([facility] [address] )  Assessment & Plan  PDMP reviewed  Receives pregabalin, MS Contin 15mg BID, and oxycodone 5mg daily PRN  - continue home regimen; oxy 5mg is ordered for severe pain BID PRN  - Toradol for moderate pain x 5 days  - monitor renal function given recent CALE with unclear trigger    Generalized anxiety disorder  Assessment & Plan  -continue home meds    Bipolar II disorder ([facility] [address] )  Assessment & Plan  -continue home meds    * Ambulatory dysfunction  Assessment & Plan  Present at baseline though worsened in the setting of metatarsal fracture  - rehab placement pending    Tachycardia-resolved as of 5/4/2021  Assessment & Plan  Tachycardic on vitals checks overnight  Not on tele  No history of arrhythmia  Patient denies symptoms  She was noted by nursing to be very anxious for much of the night, possibly related to her son visiting  In the morning, HR normal while sleeping   - likely related to anxiety  - monitor with routine vitals  - can add tele if recurrent    CALE (acute kidney injury) (HCC)-resolved as of 5/4/2021  Assessment & Plan  Creatinine has acutely risen to 1 5 on morning labs, from 1 0 the previous evening  BUN 6->12 so not clearly prerenal, but given low BP will try IV hydration   - hold morphine, replace with oxycodone  - hold NSAIDs  - IV hydration:  cc bolus   - resolved  - encourage PO fluids  - check UA  - monitor I/Os  - follow BMP in AM        Disposition: Pending placement    SUBJECTIVE     Patient seen and examined  No acute events overnight  Denies any acute complaints  Pain is about the same  Denies fever, SOB, chest pain       OBJECTIVE     Vitals:    05/05/21 1759 05/06/21 0003 05/06/21 0020 05/06/21 0808   BP: 130/90 136/84 136/84 130/81   BP Location:    Left arm   Pulse: 67 (!) 54 (!) 54 60   Resp:   18 18   Temp:  98 7 °F (37 1 °C) 98 7 °F (37 1 °C) 98 2 °F (36 8 °C)   TempSrc:   Oral Oral   SpO2: 96% 96%  98%      Temperature:   Temp (24hrs), Av 4 °F (36 9 °C), Min:98 °F (36 7 °C), Max:98 7 °F (37 1 °C)    Temperature: 98 2 °F (36 8 °C)  Intake & Output:  I/O       701 -  0700  07 -  0700 / 07 -  0700    P  O  960 360 120    I  V   0     Total Intake 960 360 120    Urine 1200 1870 450    Total Output 1200 1870 450    Net -240 -6248 -208               Weights: There is no height or weight on file to calculate BMI  Weight (last 2 days)     None        Physical Exam  Constitutional:       Appearance: She is well-developed  HENT:      Head: Normocephalic and atraumatic  Eyes:      General: No scleral icterus  Conjunctiva/sclera: Conjunctivae normal    Cardiovascular:      Rate and Rhythm: Normal rate and regular rhythm  Heart sounds: Normal heart sounds  No murmur  Pulmonary:      Effort: Pulmonary effort is normal       Breath sounds: Normal breath sounds  No wheezing or rales  Abdominal:      General: Bowel sounds are normal  There is no distension  Palpations: Abdomen is soft  Tenderness: There is no abdominal tenderness  There is no guarding  Musculoskeletal:         General: No tenderness or deformity  Skin:     General: Skin is warm and dry  Findings: No erythema  Neurological:      General: No focal deficit present  Mental Status: She is alert and oriented to person, place, and time  Psychiatric:         Mood and Affect: Mood normal          Behavior: Behavior normal        LABORATORY DATA     Labs: I have personally reviewed pertinent reports    Results from last 7 days   Lab Units 21  0639 21  0720 21   WBC Thousand/uL 4 45 3 93* 8 51   HEMOGLOBIN g/dL 12 4 13 1 14 6   HEMATOCRIT % 38 7 40 5 42 4   PLATELETS Thousands/uL 251 266 318   NEUTROS PCT %  --   --  78*   MONOS PCT %  --   --  8      Results from last 7 days   Lab Units 05/06/21  0627 05/05/21  0639 05/04/21  0720   POTASSIUM mmol/L 4 2 3 9 3 7   CHLORIDE mmol/L 110* 110* 109*   CO2 mmol/L 27 27 28   BUN mg/dL 15 15 15   CREATININE mg/dL 0 69 0 75 0 87   CALCIUM mg/dL 8 7 8 4 8 4     Results from last 7 days   Lab Units 05/04/21  0720   MAGNESIUM mg/dL 2 4                        IMAGING & DIAGNOSTIC TESTING     Radiology Results: I have personally reviewed pertinent reports  Xr Foot 3+ Vw Left    Result Date: 5/4/2021  Impression: Severe hallux valgus deformity  Lisfranc alignment appears grossly maintained  Workstation performed: [id number]     Xr Foot 3+ Views Right    Result Date: 5/2/2021  Impression: Unchanged fractures of the 2nd and 4th metatarsals  Small intra-articular fracture base of 3rd metatarsal  Workstation performed: [id number]     Ct Lower Extremity Wo Contrast Right    Result Date: 5/4/2021  Impression: 1  Intact Lisfranc ligament  2   Mildly displaced fractures noted at the 2nd through 4th metatarsal bases without intra-articular component and without additional fracture identified  Workstation performed: [id number]     Other Diagnostic Testing: I have personally reviewed pertinent reports      ACTIVE MEDICATIONS     Current Facility-Administered Medications   Medication Dose Route Frequency    acetaminophen (TYLENOL) tablet 975 mg  975 mg Oral Q8H [facility]    amLODIPine (NORVASC) tablet 5 mg  5 mg Oral Daily    busPIRone (BUSPAR) tablet 15 mg  15 mg Oral TID    calcium carbonate (TUMS) chewable tablet 500 mg  500 mg Oral Daily PRN    cholecalciferol (VITAMIN D3) tablet 1,000 Units  1,000 Units Oral Daily    Diclofenac Sodium (VOLTAREN) 1 % topical gel 2 g  2 g Topical 4x Daily    DULoxetine (CYMBALTA) delayed release capsule 60 mg  60 mg Oral Daily    heparin (porcine) subcutaneous injection 7,500 Units  7,500 Units Subcutaneous Q8H [facility]    hydrOXYzine HCL (ATARAX) tablet 25 mg  25 mg Oral Q6H PRN    ketorolac (TORADOL) tablet 10 mg  10 mg Oral Q6H PRN  morphine (MS CONTIN) ER tablet 15 mg  15 mg Oral Q12H [facility]    nortriptyline (PAMELOR) capsule 10 mg  10 mg Oral HS    oxybutynin (DITROPAN) tablet 5 mg  5 mg Oral BID    oxyCODONE (ROXICODONE) IR tablet 5 mg  5 mg Oral BID PRN    pantoprazole (PROTONIX) EC tablet 40 mg  40 mg Oral Early Morning    polyethylene glycol (MIRALAX) packet 17 g  17 g Oral Daily    prazosin (MINIPRESS) capsule 2 mg  2 mg Oral Daily    pregabalin (LYRICA) capsule 200 mg  200 mg Oral BID    And    pregabalin (LYRICA) capsule 25 mg  25 mg Oral BID    propranolol (INDERAL) tablet 20 mg  20 mg Oral BID    QUEtiapine (SEROquel) tablet 300 mg  300 mg Oral HS    senna-docusate sodium (SENOKOT S) 8 6-50 mg per tablet 1 tablet  1 tablet Oral HS    sodium chloride 0 9 % infusion  20 mL/hr Intravenous Continuous PRN    Valbenazine Tosylate CAPS 80 mg  80 mg Oral Daily       VTE Pharmacologic Prophylaxis: Heparin  VTE Mechanical Prophylaxis: sequential compression device    Portions of the record may have been created with voice recognition software  Occasional wrong word or "sound a like" substitutions may have occurred due to the inherent limitations of voice recognition software    Read the chart carefully and recognize, using context, where substitutions have occurred   ==  Otoniel Solomon, DO  [address]  Internal Medicine Residency PGY-3

## 2021-05-06 NOTE — CASE MANAGEMENT
Patient accepted to [facility] 2029  Facility will obtain insurance auth  Cm requested a new covid test prior to d/c

## 2021-05-06 NOTE — PROGRESS NOTES
Call received from Ifeoma Hurley at [facility] who informs SW pt has been re-admitted to them for STR  Also present was Can Godinez RN/CM  SW has requested the she f/u with pt re possible referral for the [place] program which should help to keep her safer  SW to remain available to assist as indicted

## 2021-05-07 ENCOUNTER — TELEPHONE (OUTPATIENT)
Dept: INTERNAL MEDICINE CLINIC | Facility: CLINIC | Age: 56
End: 2021-05-07

## 2021-05-07 PROBLEM — R11.0 NAUSEA: Status: ACTIVE | Noted: 2021-05-07

## 2021-05-07 LAB
ANION GAP SERPL CALCULATED.3IONS-SCNC: 1 MMOL/L (ref 4–13)
BUN SERPL-MCNC: 13 MG/DL (ref 5–25)
CALCIUM SERPL-MCNC: 8.8 MG/DL (ref 8.3–10.1)
CHLORIDE SERPL-SCNC: 108 MMOL/L (ref 100–108)
CO2 SERPL-SCNC: 32 MMOL/L (ref 21–32)
CREAT SERPL-MCNC: 0.75 MG/DL (ref 0.6–1.3)
GFR SERPL CREATININE-BSD FRML MDRD: 102 ML/MIN/1.73SQ M
GLUCOSE SERPL-MCNC: 82 MG/DL (ref 65–140)
POTASSIUM SERPL-SCNC: 3.8 MMOL/L (ref 3.5–5.3)
SODIUM SERPL-SCNC: 141 MMOL/L (ref 136–145)

## 2021-05-07 PROCEDURE — 80048 BASIC METABOLIC PNL TOTAL CA: CPT | Performed by: INTERNAL MEDICINE

## 2021-05-07 RX ORDER — OXYCODONE HYDROCHLORIDE 5 MG/1
5 TABLET ORAL DAILY PRN
Status: DISCONTINUED | OUTPATIENT
Start: 2021-05-07 | End: 2021-05-17 | Stop reason: HOSPADM

## 2021-05-07 RX ORDER — IBUPROFEN 400 MG/1
400 TABLET ORAL EVERY 6 HOURS PRN
Status: DISCONTINUED | OUTPATIENT
Start: 2021-05-07 | End: 2021-05-17 | Stop reason: HOSPADM

## 2021-05-07 RX ORDER — AMOXICILLIN 250 MG
1 CAPSULE ORAL 2 TIMES DAILY
Status: DISCONTINUED | OUTPATIENT
Start: 2021-05-07 | End: 2021-05-17 | Stop reason: HOSPADM

## 2021-05-07 RX ORDER — ONDANSETRON 2 MG/ML
4 INJECTION INTRAMUSCULAR; INTRAVENOUS EVERY 6 HOURS PRN
Status: DISCONTINUED | OUTPATIENT
Start: 2021-05-07 | End: 2021-05-16

## 2021-05-07 RX ADMIN — ACETAMINOPHEN 975 MG: 325 TABLET ORAL at 06:02

## 2021-05-07 RX ADMIN — PREGABALIN 200 MG: 100 CAPSULE ORAL at 21:59

## 2021-05-07 RX ADMIN — ONDANSETRON 4 MG: 2 INJECTION INTRAMUSCULAR; INTRAVENOUS at 11:22

## 2021-05-07 RX ADMIN — BUSPIRONE HYDROCHLORIDE 15 MG: 10 TABLET ORAL at 18:05

## 2021-05-07 RX ADMIN — ACETAMINOPHEN 975 MG: 325 TABLET ORAL at 14:42

## 2021-05-07 RX ADMIN — PREGABALIN 25 MG: 25 CAPSULE ORAL at 22:00

## 2021-05-07 RX ADMIN — BUSPIRONE HYDROCHLORIDE 15 MG: 10 TABLET ORAL at 22:00

## 2021-05-07 RX ADMIN — MORPHINE SULFATE 15 MG: 15 TABLET, FILM COATED, EXTENDED RELEASE ORAL at 21:58

## 2021-05-07 RX ADMIN — NORTRIPTYLINE HYDROCHLORIDE 10 MG: 10 CAPSULE ORAL at 21:58

## 2021-05-07 RX ADMIN — PANTOPRAZOLE SODIUM 40 MG: 40 TABLET, DELAYED RELEASE ORAL at 06:02

## 2021-05-07 RX ADMIN — PREGABALIN 25 MG: 25 CAPSULE ORAL at 09:12

## 2021-05-07 RX ADMIN — DULOXETINE HYDROCHLORIDE 60 MG: 60 CAPSULE, DELAYED RELEASE ORAL at 09:15

## 2021-05-07 RX ADMIN — ANTACID TABLETS 500 MG: 500 TABLET, CHEWABLE ORAL at 11:15

## 2021-05-07 RX ADMIN — HEPARIN SODIUM 7500 UNITS: 5000 INJECTION INTRAVENOUS; SUBCUTANEOUS at 22:00

## 2021-05-07 RX ADMIN — HEPARIN SODIUM 7500 UNITS: 5000 INJECTION INTRAVENOUS; SUBCUTANEOUS at 06:02

## 2021-05-07 RX ADMIN — DICLOFENAC SODIUM 2 G: 10 GEL TOPICAL at 09:22

## 2021-05-07 RX ADMIN — MORPHINE SULFATE 15 MG: 15 TABLET, FILM COATED, EXTENDED RELEASE ORAL at 09:14

## 2021-05-07 RX ADMIN — PROPRANOLOL HYDROCHLORIDE 20 MG: 20 TABLET ORAL at 18:06

## 2021-05-07 RX ADMIN — DICLOFENAC SODIUM 2 G: 10 GEL TOPICAL at 18:06

## 2021-05-07 RX ADMIN — PREGABALIN 200 MG: 100 CAPSULE ORAL at 09:13

## 2021-05-07 RX ADMIN — QUETIAPINE FUMARATE 300 MG: 100 TABLET ORAL at 21:58

## 2021-05-07 RX ADMIN — BUSPIRONE HYDROCHLORIDE 15 MG: 10 TABLET ORAL at 09:11

## 2021-05-07 RX ADMIN — Medication 1000 UNITS: at 09:12

## 2021-05-07 RX ADMIN — DICLOFENAC SODIUM 2 G: 10 GEL TOPICAL at 12:52

## 2021-05-07 RX ADMIN — HEPARIN SODIUM 7500 UNITS: 5000 INJECTION INTRAVENOUS; SUBCUTANEOUS at 14:41

## 2021-05-07 RX ADMIN — ACETAMINOPHEN 975 MG: 325 TABLET ORAL at 21:58

## 2021-05-07 RX ADMIN — OXYBUTYNIN CHLORIDE 5 MG: 5 TABLET ORAL at 09:15

## 2021-05-07 RX ADMIN — POLYETHYLENE GLYCOL 3350 17 G: 17 POWDER, FOR SOLUTION ORAL at 09:17

## 2021-05-07 RX ADMIN — OXYBUTYNIN CHLORIDE 5 MG: 5 TABLET ORAL at 18:06

## 2021-05-07 RX ADMIN — DOCUSATE SODIUM AND SENNOSIDES 1 TABLET: 8.6; 5 TABLET ORAL at 18:06

## 2021-05-07 NOTE — CASE MANAGEMENT
Pt will need to be optioned due to [address] and previous homelessness  Cm spoke with Delfina from [facility] who reported that pt's previous options process occurred more than 180 days ago; although pt was discharged from [facility] in March of this year, an extension should've been requested to extend length of validity for options paperwork  Felipe made aware and have held off on obtaining auth for now  CM informed resident Sluder of the same  A new PASRR and MA-51 were filled out for the pt and left in pt's paper chart for the attending physician to sign  Patient made aware  Cm will continue to follow

## 2021-05-07 NOTE — TELEPHONE ENCOUNTER
Folder Color- [facility]    Name of Form- CMN    Form to be filled out by- Dr Curlene Spatz to be Faxed [phone]    Patient made aware of 10 business day policy

## 2021-05-07 NOTE — UTILIZATION REVIEW
Continued Stay Review    Date: 05/07/2021                         Current Patient Class: Inpatient  Current Level of Care: Med/Surg    HPI:55 y o  female initially admitted on 05/02/2021   Ambulatory dysfunction  Assessment/Plan:  Metatarsal fracture - Per Podiatry no surgical intervention  NWB to RLE  Analgesia scheduled and PRN  Reports nausea, check CMP in AM, continue zofran PRN, Continue to monitor        Vital Signs: /68   Pulse 73   Temp 97 5 °F (36 4 °C)   Resp 18   SpO2 97%       Pertinent Labs/Diagnostic Results:   Results from last 7 days   Lab Units 05/06/21  1704   SARS-COV-2  Negative     Results from last 7 days   Lab Units 05/05/21  0639 05/04/21  0720 05/02/21 2025   WBC Thousand/uL 4 45 3 93* 8 51   HEMOGLOBIN g/dL 12 4 13 1 14 6   HEMATOCRIT % 38 7 40 5 42 4   PLATELETS Thousands/uL 251 266 318   NEUTROS ABS Thousands/µL  --   --  6 69     Results from last 7 days   Lab Units 05/07/21  0621 05/06/21  0627 05/05/21  0639 05/04/21  0720 05/03/21  0929   SODIUM mmol/L 141 142 143 141 144   POTASSIUM mmol/L 3 8 4 2 3 9 3 7 2 9*   CHLORIDE mmol/L 108 110* 110* 109* 106   CO2 mmol/L 32 27 27 28 31   ANION GAP mmol/L 1* 5 6 4 7   BUN mg/dL 13 15 15 15 12   CREATININE mg/dL 0 75 0 69 0 75 0 87 1 51*   EGFR ml/min/1 73sq m 102 112 102 86 44   CALCIUM mg/dL 8 8 8 7 8 4 8 4 8 7   MAGNESIUM mg/dL  --   --   --  2 4  --      Results from last 7 days   Lab Units 05/07/21  0621 05/06/21  0627 05/05/21  0639 05/04/21  0720 05/03/21  0929 05/02/21 2025   GLUCOSE RANDOM mg/dL 82 79 77 89 96 110     Results from last 7 days   Lab Units 05/04/21  0725   CLARITY UA  Cloudy   COLOR UA  Dk Yellow   SPEC GRAV UA  1 026   PH UA  6 5   GLUCOSE UA mg/dl Negative   KETONES UA mg/dl Trace*   BLOOD UA  Negative   PROTEIN UA mg/dl Negative   NITRITE UA  Negative   BILIRUBIN UA  Interference- unable to analyze*   UROBILINOGEN UA E U /dl 1 0   LEUKOCYTES UA  Small*   WBC UA /hpf 10-20*   RBC UA /hpf None Seen BACTERIA UA /hpf Occasional   EPITHELIAL CELLS WET PREP /hpf Moderate*       Medications:   Scheduled Medications:  acetaminophen, 975 mg, Oral, Q8H [facility]  amLODIPine, 5 mg, Oral, Daily  busPIRone, 15 mg, Oral, TID  cholecalciferol, 1,000 Units, Oral, Daily  Diclofenac Sodium, 2 g, Topical, 4x Daily  DULoxetine, 60 mg, Oral, Daily  heparin (porcine), 7,500 Units, Subcutaneous, Q8H [facility]  morphine, 15 mg, Oral, Q12H [facility]  nortriptyline, 10 mg, Oral, HS  oxybutynin, 5 mg, Oral, BID  pantoprazole, 40 mg, Oral, Early Morning  polyethylene glycol, 17 g, Oral, Daily  prazosin, 2 mg, Oral, Daily  pregabalin, 200 mg, Oral, BID    And  pregabalin, 25 mg, Oral, BID  propranolol, 20 mg, Oral, BID  QUEtiapine, 300 mg, Oral, HS  senna-docusate sodium, 1 tablet, Oral, BID  Valbenazine Tosylate, 80 mg, Oral, Daily      Continuous IV Infusions:  sodium chloride, 20 mL/hr, Intravenous, Continuous PRN      PRN Meds:  calcium carbonate, 500 mg, Oral, Daily PRN  hydrOXYzine HCL, 25 mg, Oral, Q6H PRN  ibuprofen, 400 mg, Oral, Q6H PRN  ondansetron, 4 mg, Intravenous, Q6H PRN   X 1 dose 5/7  oxyCODONE, 5 mg, Oral, Daily PRN  sodium chloride, 20 mL/hr, Intravenous, Continuous PRN        Discharge Plan: D    Network Utilization Review Department  ATTENTION: Please call with any questions or concerns to [phone] and carefully listen to the prompts so that you are directed to the right person  All voicemails are confidential   [facility] all requests for admission clinical reviews, approved or denied determinations and any other requests to dedicated fax number below belonging to the campus where the patient is receiving treatment   List of dedicated fax numbers for the Facilities:  [address] DENIALS (Administrative/Medical Necessity) [phone]   [address] [place] [address] (Maternity/NICU/Pediatrics) 261 [facility] Floor [phone]   [address] [phone] Danielle Mendes [address] [id number] [place] [address] [address]za [address] [place] [address] [phone]

## 2021-05-07 NOTE — PROGRESS NOTES
INTERNAL MEDICINE RESIDENCY PROGRESS NOTE     Name: Luis Eduardo Schaffer   Age & Sex: 60 y o  female   MRN: [id number]  Unit/Bed#: CW2 218-01   Encounter: [id number]  Team: SOD Team B     PATIENT INFORMATION     Name: Luis Eduardo Schaffer   Age & Sex: 60 y o  female   MRN: [id number]  Hospital Stay Days: 5    ASSESSMENT/PLAN     Principal Problem:    Ambulatory dysfunction  Active Problems:    Fibromyalgia    Bipolar II disorder (HCC)    Generalized anxiety disorder    Hypertension    Insomnia    Migraine    Opioid dependence ([address] )    Left hip pain    Post traumatic stress disorder    Metatarsal fracture    Chronic back pain    Nausea      Nausea  Assessment & Plan  Patient reports nausea, mild but persistent today  No change with Zofran x 1 or tums  No BM in 2 days  She has history of abdominal approach to back surgery in 2018, otherwise no abdominal surgeries  - check AM CMP  - continue Zofran PRN  - continue to monitor    Metatarsal fracture  Assessment & Plan  Mechanical fall 2 days ago, imaging at [facility] showed 2nd, 3rd, 4th non displaced fracture of metatarsals  Patient has been bearing weight on the right foot in ambulating around her house, this morning she reports the pain was too severe and she came into the ED, patient is requesting  Placement into a nursing facility     Xray obtained in ER reviewed: Unchanged nondisplaced fractures at the bases of the 2nd and 4th metatarsals  Small intra-articular fracture at the base of the 3rd metatarsal   No significant widening between the 1st and 2nd metatarsals to suggest Lisfranc injury  - podiatry consulted - no surgical intervention indicated  - NWB to RLE  - pain management with home Lyrica, morphine, Toradol 10mg PO for moderate pain (5 days), oxycodone 5mg BID PRN for severe pain  - rehab placement pending    Left hip pain  Assessment & Plan  History of bilateral hip OA  Patient reports worsening of chronic left hip pain since her fall   Notes pain is worse with laying on left side  Associated numbness and tingling on anterolateral thigh  No groin pain  ROM intact, no pain with internal or external rotation, though difficulty weight bearing per RN  - suspect trochanteric bursitis and possibly concurrent meralgia paresthetica based on exam and description  - doubt acute joint pathology at this time  - added back Toradol now that CALE resolved  - continue home pregabalin    Opioid dependence ([facility] [address] )  Assessment & Plan  PDMP reviewed  Receives pregabalin, MS Contin 15mg BID, and oxycodone 5mg daily PRN  - continue home regimen; oxy 5mg is ordered for severe pain BID PRN  - Toradol for moderate pain x 5 days  - monitor renal function given recent CALE with unclear trigger    Generalized anxiety disorder  Assessment & Plan  -continue home meds    Bipolar II disorder ([facility] [address] )  Assessment & Plan  -continue home meds    * Ambulatory dysfunction  Assessment & Plan  Present at baseline though worsened in the setting of metatarsal fracture  - rehab placement pending    Tachycardia-resolved as of 5/4/2021  Assessment & Plan  Tachycardic on vitals checks overnight  Not on tele  No history of arrhythmia  Patient denies symptoms  She was noted by nursing to be very anxious for much of the night, possibly related to her son visiting  In the morning, HR normal while sleeping   - likely related to anxiety  - monitor with routine vitals  - can add tele if recurrent    CALE (acute kidney injury) (HCC)-resolved as of 5/4/2021  Assessment & Plan  Creatinine has acutely risen to 1 5 on morning labs, from 1 0 the previous evening  BUN 6->12 so not clearly prerenal, but given low BP will try IV hydration   - hold morphine, replace with oxycodone  - hold NSAIDs  - IV hydration:  cc bolus   - resolved  - encourage PO fluids  - check UA  - monitor I/Os  - follow BMP in AM        Disposition: Continue inpatient, pending placement     SUBJECTIVE     Patient seen and examined   No acute events overnight  She slept well  Pain in right foot and left hip are present, at baseline  Later in the day, she notes that she became nauseous prior to eating breakfast, ate breakfast, has receiving Zofran and Tums x 1  No vomiting  No appetite for lunch  Denies fever or chills  Denies pain     OBJECTIVE     Vitals:    21 2221 21 2346 21 0702 21 1220   BP:  125/72 109/68    BP Location:  Left arm     Pulse:  66 (!) 52 73   Resp:  19 18    Temp:  98 9 °F (37 2 °C) 97 5 °F (36 4 °C)    TempSrc:  Oral     SpO2: 96% 96% 97% 97%      Temperature:   Temp (24hrs), Av 1 °F (36 7 °C), Min:97 5 °F (36 4 °C), Max:98 9 °F (37 2 °C)    Temperature: 97 5 °F (36 4 °C)  Intake & Output:  I/O       / 07 -  0700 / 07 -  0700 / 07 - / 0700    P  O  360 1260 480    I V  0      Total Intake 360 1260 480    Urine 1870 1525 450    Total Output 1870 1525 450    Net -1510 -265 +30               Weights: There is no height or weight on file to calculate BMI  Weight (last 2 days)     None        Physical Exam  Constitutional:       Appearance: She is well-developed  HENT:      Head: Normocephalic and atraumatic  Eyes:      General: No scleral icterus  Conjunctiva/sclera: Conjunctivae normal    Cardiovascular:      Rate and Rhythm: Normal rate and regular rhythm  Heart sounds: Normal heart sounds  No murmur  Pulmonary:      Effort: Pulmonary effort is normal       Breath sounds: Normal breath sounds  No wheezing or rales  Abdominal:      General: Bowel sounds are normal  There is no distension  Palpations: Abdomen is soft  Tenderness: There is abdominal tenderness (very mild diffuse tenderness)  There is no guarding  Musculoskeletal:         General: No tenderness or deformity  Skin:     General: Skin is warm and dry  Findings: No erythema  Neurological:      General: No focal deficit present        Mental Status: She is alert and oriented to person, place, and time  Psychiatric:         Mood and Affect: Mood normal          Behavior: Behavior normal        LABORATORY DATA     Labs: I have personally reviewed pertinent reports  Results from last 7 days   Lab Units 05/05/21  0639 05/04/21  0720 05/02/21 2025   WBC Thousand/uL 4 45 3 93* 8 51   HEMOGLOBIN g/dL 12 4 13 1 14 6   HEMATOCRIT % 38 7 40 5 42 4   PLATELETS Thousands/uL 251 266 318   NEUTROS PCT %  --   --  78*   MONOS PCT %  --   --  8      Results from last 7 days   Lab Units 05/07/21  0621 05/06/21  0627 05/05/21  0639   POTASSIUM mmol/L 3 8 4 2 3 9   CHLORIDE mmol/L 108 110* 110*   CO2 mmol/L 32 27 27   BUN mg/dL 13 15 15   CREATININE mg/dL 0 75 0 69 0 75   CALCIUM mg/dL 8 8 8 7 8 4     Results from last 7 days   Lab Units 05/04/21  0720   MAGNESIUM mg/dL 2 4                        IMAGING & DIAGNOSTIC TESTING     Radiology Results: I have personally reviewed pertinent reports  Xr Foot 3+ Vw Left    Result Date: 5/4/2021  Impression: Severe hallux valgus deformity  Lisfranc alignment appears grossly maintained  Workstation performed: [id number]     Xr Foot 3+ Views Right    Result Date: 5/2/2021  Impression: Unchanged fractures of the 2nd and 4th metatarsals  Small intra-articular fracture base of 3rd metatarsal  Workstation performed: [id number]     Ct Lower Extremity Wo Contrast Right    Result Date: 5/4/2021  Impression: 1  Intact Lisfranc ligament  2   Mildly displaced fractures noted at the 2nd through 4th metatarsal bases without intra-articular component and without additional fracture identified  Workstation performed: [id number]     Other Diagnostic Testing: I have personally reviewed pertinent reports      ACTIVE MEDICATIONS     Current Facility-Administered Medications   Medication Dose Route Frequency    acetaminophen (TYLENOL) tablet 975 mg  975 mg Oral Q8H [facility]    amLODIPine (NORVASC) tablet 5 mg  5 mg Oral Daily    busPIRone (BUSPAR) tablet 15 mg  15 mg Oral TID    calcium carbonate (TUMS) chewable tablet 500 mg  500 mg Oral Daily PRN    cholecalciferol (VITAMIN D3) tablet 1,000 Units  1,000 Units Oral Daily    Diclofenac Sodium (VOLTAREN) 1 % topical gel 2 g  2 g Topical 4x Daily    DULoxetine (CYMBALTA) delayed release capsule 60 mg  60 mg Oral Daily    heparin (porcine) subcutaneous injection 7,500 Units  7,500 Units Subcutaneous Q8H [facility]    hydrOXYzine HCL (ATARAX) tablet 25 mg  25 mg Oral Q6H PRN    ibuprofen (MOTRIN) tablet 400 mg  400 mg Oral Q6H PRN    morphine (MS CONTIN) ER tablet 15 mg  15 mg Oral Q12H [facility]    nortriptyline (PAMELOR) capsule 10 mg  10 mg Oral HS    ondansetron (ZOFRAN) injection 4 mg  4 mg Intravenous Q6H PRN    oxybutynin (DITROPAN) tablet 5 mg  5 mg Oral BID    oxyCODONE (ROXICODONE) IR tablet 5 mg  5 mg Oral Daily PRN    pantoprazole (PROTONIX) EC tablet 40 mg  40 mg Oral Early Morning    polyethylene glycol (MIRALAX) packet 17 g  17 g Oral Daily    prazosin (MINIPRESS) capsule 2 mg  2 mg Oral Daily    pregabalin (LYRICA) capsule 200 mg  200 mg Oral BID    And    pregabalin (LYRICA) capsule 25 mg  25 mg Oral BID    propranolol (INDERAL) tablet 20 mg  20 mg Oral BID    QUEtiapine (SEROquel) tablet 300 mg  300 mg Oral HS    senna-docusate sodium (SENOKOT S) 8 6-50 mg per tablet 1 tablet  1 tablet Oral BID    sodium chloride 0 9 % infusion  20 mL/hr Intravenous Continuous PRN    Valbenazine Tosylate CAPS 80 mg  80 mg Oral Daily       VTE Pharmacologic Prophylaxis: Heparin  VTE Mechanical Prophylaxis: sequential compression device    Portions of the record may have been created with voice recognition software  Occasional wrong word or "sound a like" substitutions may have occurred due to the inherent limitations of voice recognition software    Read the chart carefully and recognize, using context, where substitutions have occurred   ==  Barbie Mancera, [facility]  Internal Medicine Residency PGY-3

## 2021-05-07 NOTE — PROGRESS NOTES
Patient has an IV that is ready to be changed, however, she has been accepted to [facility] for rehab and possibly leaving today  I asked Dr Benigno Brown from [facility] if it was ok to keep this IV in rather than re-stick her  Ok to leave this IV in until d/c per Dr Maik Pena

## 2021-05-07 NOTE — ASSESSMENT & PLAN NOTE
Patient reports nausea, comes and goes  Initially no change with Zofran x 1 or tums  No BM in 2 days  She has history of abdominal approach to back surgery in 2018, otherwise no abdominal surgeries    - continue Zofran PRN, TUMS PRN  - continue to monitor

## 2021-05-07 NOTE — PLAN OF CARE
Problem: PAIN - ADULT  Goal: Verbalizes/displays adequate comfort level or baseline comfort level  Description: Interventions:  - Encourage patient to monitor pain and request assistance  - Assess pain using appropriate pain scale  - Administer analgesics based on type and severity of pain and evaluate response  - Implement non-pharmacological measures as appropriate and evaluate response  - Consider cultural and social influences on pain and pain management  - Notify physician/advanced practitioner if interventions unsuccessful or patient reports new pain  Outcome: Progressing     Problem: INFECTION - ADULT  Goal: Absence or prevention of progression during hospitalization  Description: INTERVENTIONS:  - Assess and monitor for signs and symptoms of infection  - Monitor lab/diagnostic results  - Monitor all insertion sites, i e  indwelling lines, tubes, and drains  - Monitor endotracheal if appropriate and nasal secretions for changes in amount and color  - [place] appropriate cooling/warming therapies per order  - Administer medications as ordered  - Instruct and encourage patient and family to use good hand hygiene technique  - Identify and instruct in appropriate isolation precautions for identified infection/condition  Outcome: Progressing  Goal: Absence of fever/infection during neutropenic period  Description: INTERVENTIONS:  - Monitor WBC    Outcome: Progressing     Problem: SAFETY ADULT  Goal: Patient will remain free of falls  Description: INTERVENTIONS:  - Assess patient frequently for physical needs  -  Identify cognitive and physical deficits and behaviors that affect risk of falls    -  [place] fall precautions as indicated by assessment   - Educate patient/family on patient safety including physical limitations  - Instruct patient to call for assistance with activity based on assessment  - Modify environment to reduce risk of injury  - Consider OT/PT consult to assist with strengthening/mobility  Outcome: Progressing  Goal: Maintain or return to baseline ADL function  Description: INTERVENTIONS:  -  Assess patient's ability to carry out ADLs; assess patient's baseline for ADL function and identify physical deficits which impact ability to perform ADLs (bathing, care of mouth/teeth, toileting, grooming, dressing, etc )  - Assess/evaluate cause of self-care deficits   - Assess range of motion  - Assess patient's mobility; develop plan if impaired  - Assess patient's need for assistive devices and provide as appropriate  - Encourage maximum independence but intervene and supervise when necessary  - Involve family in performance of ADLs  - Assess for home care needs following discharge   - Consider OT consult to assist with ADL evaluation and planning for discharge  - Provide patient education as appropriate  Outcome: Progressing  Goal: Maintain or return mobility status to optimal level  Description: INTERVENTIONS:  - Assess patient's baseline mobility status (ambulation, transfers, stairs, etc )    - Identify cognitive and physical deficits and behaviors that affect mobility  - Identify mobility aids required to assist with transfers and/or ambulation (gait belt, sit-to-stand, lift, walker, cane, etc )  - [place] fall precautions as indicated by assessment  - Record patient progress and toleration of activity level on Mobility SBAR; progress patient to next Phase/Stage  - Instruct patient to call for assistance with activity based on assessment  - Consider rehabilitation consult to assist with strengthening/weightbearing, etc   Outcome: Progressing     Problem: DISCHARGE PLANNING  Goal: Discharge to home or other facility with appropriate resources  Description: INTERVENTIONS:  - Identify barriers to discharge w/patient and caregiver  - Arrange for needed discharge resources and transportation as appropriate  - Identify discharge learning needs (meds, wound care, etc )  - Arrange for interpretive services to assist at discharge as needed  - Refer to Case Management Department for coordinating discharge planning if the patient needs post-hospital services based on physician/advanced practitioner order or complex needs related to functional status, cognitive ability, or social support system  Outcome: Progressing     Problem: Knowledge Deficit  Goal: Patient/family/caregiver demonstrates understanding of disease process, treatment plan, medications, and discharge instructions  Description: Complete learning assessment and assess knowledge base  Interventions:  - Provide teaching at level of understanding  - Provide teaching via preferred learning methods  Outcome: Progressing     Problem: Potential for Falls  Goal: Patient will remain free of falls  Description: INTERVENTIONS:  - Assess patient frequently for physical needs  -  Identify cognitive and physical deficits and behaviors that affect risk of falls    -  [place] fall precautions as indicated by assessment   - Educate patient/family on patient safety including physical limitations  - Instruct patient to call for assistance with activity based on assessment  - Modify environment to reduce risk of injury  - Consider OT/PT consult to assist with strengthening/mobility  Outcome: Progressing     Problem: Prexisting or High Potential for Compromised Skin Integrity  Goal: Skin integrity is maintained or improved  Description: INTERVENTIONS:  - Identify patients at risk for skin breakdown  - Assess and monitor skin integrity  - Assess and monitor nutrition and hydration status  - Monitor labs   - Assess for incontinence   - Turn and reposition patient  - Assist with mobility/ambulation  - Relieve pressure over bony prominences  - Avoid friction and shearing  - Provide appropriate hygiene as needed including keeping skin clean and dry  - Evaluate need for skin moisturizer/barrier cream  - Collaborate with interdisciplinary team   - Patient/family teaching  - Consider wound care consult   Outcome: Progressing

## 2021-05-08 DIAGNOSIS — F41.1 GENERALIZED ANXIETY DISORDER: ICD-10-CM

## 2021-05-08 DIAGNOSIS — Z01.818 PREOP TESTING: ICD-10-CM

## 2021-05-08 DIAGNOSIS — M17.11 PRIMARY OSTEOARTHRITIS OF RIGHT KNEE: ICD-10-CM

## 2021-05-08 LAB
ALBUMIN SERPL BCP-MCNC: 2.8 G/DL (ref 3.5–5)
ALP SERPL-CCNC: 86 U/L (ref 46–116)
ALT SERPL W P-5'-P-CCNC: 30 U/L (ref 12–78)
ANION GAP SERPL CALCULATED.3IONS-SCNC: 4 MMOL/L (ref 4–13)
AST SERPL W P-5'-P-CCNC: 18 U/L (ref 5–45)
BILIRUB SERPL-MCNC: 0.42 MG/DL (ref 0.2–1)
BUN SERPL-MCNC: 13 MG/DL (ref 5–25)
CALCIUM ALBUM COR SERPL-MCNC: 9.2 MG/DL (ref 8.3–10.1)
CALCIUM SERPL-MCNC: 8.2 MG/DL (ref 8.3–10.1)
CHLORIDE SERPL-SCNC: 110 MMOL/L (ref 100–108)
CO2 SERPL-SCNC: 28 MMOL/L (ref 21–32)
CREAT SERPL-MCNC: 0.74 MG/DL (ref 0.6–1.3)
ERYTHROCYTE [DISTWIDTH] IN BLOOD BY AUTOMATED COUNT: 14 % (ref 11.6–15.1)
GFR SERPL CREATININE-BSD FRML MDRD: 104 ML/MIN/1.73SQ M
GLUCOSE SERPL-MCNC: 84 MG/DL (ref 65–140)
HCT VFR BLD AUTO: 39.3 % (ref 34.8–46.1)
HGB BLD-MCNC: 12.6 G/DL (ref 11.5–15.4)
MCH RBC QN AUTO: 28.8 PG (ref 26.8–34.3)
MCHC RBC AUTO-ENTMCNC: 32.1 G/DL (ref 31.4–37.4)
MCV RBC AUTO: 90 FL (ref 82–98)
PLATELET # BLD AUTO: 274 THOUSANDS/UL (ref 149–390)
PMV BLD AUTO: 9.6 FL (ref 8.9–12.7)
POTASSIUM SERPL-SCNC: 3.4 MMOL/L (ref 3.5–5.3)
PROT SERPL-MCNC: 6 G/DL (ref 6.4–8.2)
RBC # BLD AUTO: 4.38 MILLION/UL (ref 3.81–5.12)
SODIUM SERPL-SCNC: 142 MMOL/L (ref 136–145)
WBC # BLD AUTO: 4.27 THOUSAND/UL (ref 4.31–10.16)

## 2021-05-08 PROCEDURE — 80053 COMPREHEN METABOLIC PANEL: CPT | Performed by: INTERNAL MEDICINE

## 2021-05-08 PROCEDURE — 85027 COMPLETE CBC AUTOMATED: CPT | Performed by: INTERNAL MEDICINE

## 2021-05-08 RX ORDER — POTASSIUM CHLORIDE 20 MEQ/1
40 TABLET, EXTENDED RELEASE ORAL ONCE
Status: COMPLETED | OUTPATIENT
Start: 2021-05-08 | End: 2021-05-08

## 2021-05-08 RX ADMIN — PANTOPRAZOLE SODIUM 40 MG: 40 TABLET, DELAYED RELEASE ORAL at 06:12

## 2021-05-08 RX ADMIN — DOCUSATE SODIUM AND SENNOSIDES 1 TABLET: 8.6; 5 TABLET ORAL at 17:50

## 2021-05-08 RX ADMIN — BUSPIRONE HYDROCHLORIDE 15 MG: 10 TABLET ORAL at 10:25

## 2021-05-08 RX ADMIN — HEPARIN SODIUM 7500 UNITS: 5000 INJECTION INTRAVENOUS; SUBCUTANEOUS at 21:09

## 2021-05-08 RX ADMIN — ACETAMINOPHEN 975 MG: 325 TABLET ORAL at 14:36

## 2021-05-08 RX ADMIN — ACETAMINOPHEN 975 MG: 325 TABLET ORAL at 06:12

## 2021-05-08 RX ADMIN — MORPHINE SULFATE 15 MG: 15 TABLET, FILM COATED, EXTENDED RELEASE ORAL at 10:16

## 2021-05-08 RX ADMIN — PREGABALIN 200 MG: 100 CAPSULE ORAL at 21:10

## 2021-05-08 RX ADMIN — HEPARIN SODIUM 7500 UNITS: 5000 INJECTION INTRAVENOUS; SUBCUTANEOUS at 14:36

## 2021-05-08 RX ADMIN — DULOXETINE HYDROCHLORIDE 60 MG: 60 CAPSULE, DELAYED RELEASE ORAL at 10:18

## 2021-05-08 RX ADMIN — BUSPIRONE HYDROCHLORIDE 15 MG: 10 TABLET ORAL at 17:00

## 2021-05-08 RX ADMIN — OXYBUTYNIN CHLORIDE 5 MG: 5 TABLET ORAL at 17:51

## 2021-05-08 RX ADMIN — POTASSIUM CHLORIDE 40 MEQ: 1500 TABLET, EXTENDED RELEASE ORAL at 10:17

## 2021-05-08 RX ADMIN — DOCUSATE SODIUM AND SENNOSIDES 1 TABLET: 8.6; 5 TABLET ORAL at 10:17

## 2021-05-08 RX ADMIN — Medication 1000 UNITS: at 10:18

## 2021-05-08 RX ADMIN — BUSPIRONE HYDROCHLORIDE 15 MG: 10 TABLET ORAL at 21:21

## 2021-05-08 RX ADMIN — MORPHINE SULFATE 15 MG: 15 TABLET, FILM COATED, EXTENDED RELEASE ORAL at 21:10

## 2021-05-08 RX ADMIN — ACETAMINOPHEN 975 MG: 325 TABLET ORAL at 21:10

## 2021-05-08 RX ADMIN — OXYCODONE HYDROCHLORIDE 5 MG: 5 TABLET ORAL at 10:27

## 2021-05-08 RX ADMIN — NORTRIPTYLINE HYDROCHLORIDE 10 MG: 10 CAPSULE ORAL at 21:11

## 2021-05-08 RX ADMIN — HEPARIN SODIUM 7500 UNITS: 5000 INJECTION INTRAVENOUS; SUBCUTANEOUS at 06:11

## 2021-05-08 RX ADMIN — PREGABALIN 25 MG: 25 CAPSULE ORAL at 10:18

## 2021-05-08 RX ADMIN — PROPRANOLOL HYDROCHLORIDE 20 MG: 20 TABLET ORAL at 17:55

## 2021-05-08 RX ADMIN — PREGABALIN 200 MG: 100 CAPSULE ORAL at 10:16

## 2021-05-08 RX ADMIN — PREGABALIN 25 MG: 25 CAPSULE ORAL at 21:12

## 2021-05-08 RX ADMIN — OXYBUTYNIN CHLORIDE 5 MG: 5 TABLET ORAL at 10:17

## 2021-05-08 RX ADMIN — QUETIAPINE FUMARATE 300 MG: 100 TABLET ORAL at 21:21

## 2021-05-08 NOTE — PLAN OF CARE
Problem: PAIN - ADULT  Goal: Verbalizes/displays adequate comfort level or baseline comfort level  Description: Interventions:  - Encourage patient to monitor pain and request assistance  - Assess pain using appropriate pain scale  - Administer analgesics based on type and severity of pain and evaluate response  - Implement non-pharmacological measures as appropriate and evaluate response  - Consider cultural and social influences on pain and pain management  - Notify physician/advanced practitioner if interventions unsuccessful or patient reports new pain  Outcome: Progressing     Problem: INFECTION - ADULT  Goal: Absence or prevention of progression during hospitalization  Description: INTERVENTIONS:  - Assess and monitor for signs and symptoms of infection  - Monitor lab/diagnostic results  - Monitor all insertion sites, i e  indwelling lines, tubes, and drains  - Monitor endotracheal if appropriate and nasal secretions for changes in amount and color  - [place] appropriate cooling/warming therapies per order  - Administer medications as ordered  - Instruct and encourage patient and family to use good hand hygiene technique  - Identify and instruct in appropriate isolation precautions for identified infection/condition  Outcome: Progressing  Goal: Absence of fever/infection during neutropenic period  Description: INTERVENTIONS:  - Monitor WBC    Outcome: Progressing     Problem: SAFETY ADULT  Goal: Patient will remain free of falls  Description: INTERVENTIONS:  - Assess patient frequently for physical needs  -  Identify cognitive and physical deficits and behaviors that affect risk of falls    -  [place] fall precautions as indicated by assessment   - Educate patient/family on patient safety including physical limitations  - Instruct patient to call for assistance with activity based on assessment  - Modify environment to reduce risk of injury  - Consider OT/PT consult to assist with strengthening/mobility  Outcome: Progressing  Goal: Maintain or return to baseline ADL function  Description: INTERVENTIONS:  -  Assess patient's ability to carry out ADLs; assess patient's baseline for ADL function and identify physical deficits which impact ability to perform ADLs (bathing, care of mouth/teeth, toileting, grooming, dressing, etc )  - Assess/evaluate cause of self-care deficits   - Assess range of motion  - Assess patient's mobility; develop plan if impaired  - Assess patient's need for assistive devices and provide as appropriate  - Encourage maximum independence but intervene and supervise when necessary  - Involve family in performance of ADLs  - Assess for home care needs following discharge   - Consider OT consult to assist with ADL evaluation and planning for discharge  - Provide patient education as appropriate  Outcome: Progressing  Goal: Maintain or return mobility status to optimal level  Description: INTERVENTIONS:  - Assess patient's baseline mobility status (ambulation, transfers, stairs, etc )    - Identify cognitive and physical deficits and behaviors that affect mobility  - Identify mobility aids required to assist with transfers and/or ambulation (gait belt, sit-to-stand, lift, walker, cane, etc )  - [place] fall precautions as indicated by assessment  - Record patient progress and toleration of activity level on Mobility SBAR; progress patient to next Phase/Stage  - Instruct patient to call for assistance with activity based on assessment  - Consider rehabilitation consult to assist with strengthening/weightbearing, etc   Outcome: Progressing     Problem: DISCHARGE PLANNING  Goal: Discharge to home or other facility with appropriate resources  Description: INTERVENTIONS:  - Identify barriers to discharge w/patient and caregiver  - Arrange for needed discharge resources and transportation as appropriate  - Identify discharge learning needs (meds, wound care, etc )  - Arrange for interpretive services to assist at discharge as needed  - Refer to Case Management Department for coordinating discharge planning if the patient needs post-hospital services based on physician/advanced practitioner order or complex needs related to functional status, cognitive ability, or social support system  Outcome: Progressing     Problem: Knowledge Deficit  Goal: Patient/family/caregiver demonstrates understanding of disease process, treatment plan, medications, and discharge instructions  Description: Complete learning assessment and assess knowledge base  Interventions:  - Provide teaching at level of understanding  - Provide teaching via preferred learning methods  Outcome: Progressing     Problem: Potential for Falls  Goal: Patient will remain free of falls  Description: INTERVENTIONS:  - Assess patient frequently for physical needs  -  Identify cognitive and physical deficits and behaviors that affect risk of falls    -  [place] fall precautions as indicated by assessment   - Educate patient/family on patient safety including physical limitations  - Instruct patient to call for assistance with activity based on assessment  - Modify environment to reduce risk of injury  - Consider OT/PT consult to assist with strengthening/mobility  Outcome: Progressing     Problem: Prexisting or High Potential for Compromised Skin Integrity  Goal: Skin integrity is maintained or improved  Description: INTERVENTIONS:  - Identify patients at risk for skin breakdown  - Assess and monitor skin integrity  - Assess and monitor nutrition and hydration status  - Monitor labs   - Assess for incontinence   - Turn and reposition patient  - Assist with mobility/ambulation  - Relieve pressure over bony prominences  - Avoid friction and shearing  - Provide appropriate hygiene as needed including keeping skin clean and dry  - Evaluate need for skin moisturizer/barrier cream  - Collaborate with interdisciplinary team   - Patient/family teaching  - Consider wound care consult   Outcome: Progressing

## 2021-05-08 NOTE — PROGRESS NOTES
INTERNAL MEDICINE RESIDENCY PROGRESS NOTE     Name: Shandra Long   Age & Sex: 60 y o  female   MRN: [id number]  Unit/Bed#: CW2 218-01   Encounter: [id number]  Team: SOD Team B     PATIENT INFORMATION     Name: Shandra Long   Age & Sex: 60 y o  female   MRN: [id number]  Hospital Stay Days: 6    ASSESSMENT/PLAN     Principal Problem:    Ambulatory dysfunction  Active Problems:    Fibromyalgia    Bipolar II disorder (HCC)    Generalized anxiety disorder    Hypertension    Insomnia    Migraine    Opioid dependence ([facility] [address] )    Left hip pain    Post traumatic stress disorder    Metatarsal fracture    Chronic back pain    Nausea      * Ambulatory dysfunction  Assessment & Plan  Present at baseline though worsened in the setting of metatarsal fracture  - rehab placement pending    Nausea  Assessment & Plan  Patient reports nausea, mild but persistent today  No change with Zofran x 1 or tums  No BM in 2 days  She has history of abdominal approach to back surgery in 2018, otherwise no abdominal surgeries  - check AM CMP  - continue Zofran PRN  - continue to monitor    Metatarsal fracture  Assessment & Plan  Mechanical fall 2 days ago, imaging at [facility] ER showed 2nd, 3rd, 4th non displaced fracture of metatarsals  Patient has been bearing weight on the right foot in ambulating around her house, this morning she reports the pain was too severe and she came into the ED, patient is requesting  Placement into a nursing facility     Xray obtained in ER reviewed: Unchanged nondisplaced fractures at the bases of the 2nd and 4th metatarsals  Small intra-articular fracture at the base of the 3rd metatarsal   No significant widening between the 1st and 2nd metatarsals to suggest Lisfranc injury      - podiatry consulted - no surgical intervention indicated  - NWB to RLE  - pain management with home Lyrica, morphine, Toradol 10mg PO for moderate pain (5 days), oxycodone 5mg BID PRN for severe pain  - rehab placement pending    Left hip pain  Assessment & Plan  History of bilateral hip OA  Patient reports worsening of chronic left hip pain since her fall  Notes pain is worse with laying on left side  Associated numbness and tingling on anterolateral thigh  No groin pain  ROM intact, no pain with internal or external rotation, though difficulty weight bearing per RN  - suspect trochanteric bursitis and possibly concurrent meralgia paresthetica based on exam and description  - doubt acute joint pathology at this time  - added back Toradol now that CALE resolved  - continue home pregabalin    Opioid dependence ([facility] [address] )  Assessment & Plan  PDMP reviewed  Receives pregabalin, MS Contin 15mg BID, and oxycodone 5mg daily PRN  - continue home regimen; oxy 5mg is ordered for severe pain BID PRN  - Toradol for moderate pain x 5 days  - monitor renal function given recent CALE with unclear trigger    Generalized anxiety disorder  Assessment & Plan  -continue home meds    Bipolar II disorder [facility])  Assessment & Plan  -continue home meds    Tachycardia-resolved as of 5/4/2021  Assessment & Plan  Tachycardic on vitals checks overnight  Not on tele  No history of arrhythmia  Patient denies symptoms  She was noted by nursing to be very anxious for much of the night, possibly related to her son visiting  In the morning, HR normal while sleeping   - likely related to anxiety  - monitor with routine vitals  - can add tele if recurrent    CALE (acute kidney injury) (HCC)-resolved as of 5/4/2021  Assessment & Plan  Creatinine has acutely risen to 1 5 on morning labs, from 1 0 the previous evening  BUN 6->12 so not clearly prerenal, but given low BP will try IV hydration   - hold morphine, replace with oxycodone  - hold NSAIDs  - IV hydration:  cc bolus   - resolved  - encourage PO fluids  - check UA  - monitor I/Os  - follow BMP in AM        Disposition:  Pending placement     SUBJECTIVE     Patient seen and examined  No acute events overnight  A&O x3  OBJECTIVE     Vitals:    21 1443 21 1634 21 0021 21 0744   BP:  130/82 106/70 (!) 92/46   BP Location:    Left arm   Pulse: 63 61 64 66   Resp:   17 18   Temp: 98 4 °F (36 9 °C) 98 3 °F (36 8 °C) 98 1 °F (36 7 °C) 98 2 °F (36 8 °C)   TempSrc:    Oral   SpO2: 98% 96% 92% 92%      Temperature:   Temp (24hrs), Av 3 °F (36 8 °C), Min:98 1 °F (36 7 °C), Max:98 4 °F (36 9 °C)    Temperature: 98 2 °F (36 8 °C)  Intake & Output:  I/O       / 07 -  0700  07 -  0700 / 07 -  0700    P  O  1260 480 120    I V  Total Intake 1260 480 120    Urine 1525 1550     Total Output 1525 1550     Net -265 -1070 +120               Weights: There is no height or weight on file to calculate BMI  Weight (last 2 days)     None        Physical Exam  Vitals signs reviewed  Constitutional:       General: She is not in acute distress  Appearance: She is well-developed  She is not diaphoretic  HENT:      Head: Normocephalic and atraumatic  Nose: Nose normal       Mouth/Throat:      Pharynx: No oropharyngeal exudate  Eyes:      General: No scleral icterus  Conjunctiva/sclera: Conjunctivae normal    Neck:      Musculoskeletal: Neck supple  Thyroid: No thyromegaly  Vascular: No JVD  Trachea: No tracheal deviation  Cardiovascular:      Rate and Rhythm: Normal rate and regular rhythm  Heart sounds: Normal heart sounds  No murmur  No friction rub  No gallop  Pulmonary:      Effort: Pulmonary effort is normal  No respiratory distress  Breath sounds: Normal breath sounds  No stridor  No wheezing or rales  Chest:      Chest wall: No tenderness  Abdominal:      General: Bowel sounds are normal  There is no distension  Palpations: Abdomen is soft  There is no mass  Tenderness: There is no abdominal tenderness  There is no guarding or rebound  Musculoskeletal: Normal range of motion           General: No tenderness  Skin:     General: Skin is warm  Findings: No erythema or rash  Neurological:      Mental Status: She is alert and oriented to person, place, and time  Sensory: No sensory deficit  Psychiatric:         Behavior: Behavior normal          Thought Content: Thought content normal          Judgment: Judgment normal        LABORATORY DATA     Labs: I have personally reviewed pertinent reports  Results from last 7 days   Lab Units 05/08/21  0535 05/05/21  0639 05/04/21  0720 05/02/21 2025   WBC Thousand/uL 4 27* 4 45 3 93* 8 51   HEMOGLOBIN g/dL 12 6 12 4 13 1 14 6   HEMATOCRIT % 39 3 38 7 40 5 42 4   PLATELETS Thousands/uL 274 251 266 318   NEUTROS PCT %  --   --   --  78*   MONOS PCT %  --   --   --  8      Results from last 7 days   Lab Units 05/08/21 0535 05/07/21  0621 05/06/21  0627   POTASSIUM mmol/L 3 4* 3 8 4 2   CHLORIDE mmol/L 110* 108 110*   CO2 mmol/L 28 32 27   BUN mg/dL 13 13 15   CREATININE mg/dL 0 74 0 75 0 69   CALCIUM mg/dL 8 2* 8 8 8 7   ALK PHOS U/L 86  --   --    ALT U/L 30  --   --    AST U/L 18  --   --      Results from last 7 days   Lab Units 05/04/21  0720   MAGNESIUM mg/dL 2 4                        IMAGING & DIAGNOSTIC TESTING     Radiology Results: I have personally reviewed pertinent reports  Xr Foot 3+ Vw Left    Result Date: 5/4/2021  Impression: Severe hallux valgus deformity  Lisfranc alignment appears grossly maintained  Workstation performed: [id number]     Xr Foot 3+ Views Right    Result Date: 5/2/2021  Impression: Unchanged fractures of the 2nd and 4th metatarsals  Small intra-articular fracture base of 3rd metatarsal  Workstation performed: [id number]     Ct Lower Extremity Wo Contrast Right    Result Date: 5/4/2021  Impression: 1  Intact Lisfranc ligament  2   Mildly displaced fractures noted at the 2nd through 4th metatarsal bases without intra-articular component and without additional fracture identified   Workstation performed: [id number] Other Diagnostic Testing: I have personally reviewed pertinent reports      ACTIVE MEDICATIONS     Current Facility-Administered Medications   Medication Dose Route Frequency    acetaminophen (TYLENOL) tablet 975 mg  975 mg Oral Q8H [facility]    amLODIPine (NORVASC) tablet 5 mg  5 mg Oral Daily    busPIRone (BUSPAR) tablet 15 mg  15 mg Oral TID    calcium carbonate (TUMS) chewable tablet 500 mg  500 mg Oral Daily PRN    cholecalciferol (VITAMIN D3) tablet 1,000 Units  1,000 Units Oral Daily    Diclofenac Sodium (VOLTAREN) 1 % topical gel 2 g  2 g Topical 4x Daily    DULoxetine (CYMBALTA) delayed release capsule 60 mg  60 mg Oral Daily    heparin (porcine) subcutaneous injection 7,500 Units  7,500 Units Subcutaneous Q8H [facility]    hydrOXYzine HCL (ATARAX) tablet 25 mg  25 mg Oral Q6H PRN    ibuprofen (MOTRIN) tablet 400 mg  400 mg Oral Q6H PRN    morphine (MS CONTIN) ER tablet 15 mg  15 mg Oral Q12H [facility]    nortriptyline (PAMELOR) capsule 10 mg  10 mg Oral HS    ondansetron (ZOFRAN) injection 4 mg  4 mg Intravenous Q6H PRN    oxybutynin (DITROPAN) tablet 5 mg  5 mg Oral BID    oxyCODONE (ROXICODONE) IR tablet 5 mg  5 mg Oral Daily PRN    pantoprazole (PROTONIX) EC tablet 40 mg  40 mg Oral Early Morning    polyethylene glycol (MIRALAX) packet 17 g  17 g Oral Daily    prazosin (MINIPRESS) capsule 2 mg  2 mg Oral Daily    pregabalin (LYRICA) capsule 200 mg  200 mg Oral BID    And    pregabalin (LYRICA) capsule 25 mg  25 mg Oral BID    propranolol (INDERAL) tablet 20 mg  20 mg Oral BID    QUEtiapine (SEROquel) tablet 300 mg  300 mg Oral HS    senna-docusate sodium (SENOKOT S) 8 6-50 mg per tablet 1 tablet  1 tablet Oral BID    sodium chloride 0 9 % infusion  20 mL/hr Intravenous Continuous PRN    Valbenazine Tosylate CAPS 80 mg  80 mg Oral Daily       VTE Pharmacologic Prophylaxis: Heparin  VTE Mechanical Prophylaxis: sequential compression device    Portions of the record may have been created with voice recognition software  Occasional wrong word or "sound a like" substitutions may have occurred due to the inherent limitations of voice recognition software    Read the chart carefully and recognize, using context, where substitutions have occurred   ==  Jake Lujan, [address] [facility]  Internal Medicine Residency PGY-2

## 2021-05-09 LAB
ANION GAP SERPL CALCULATED.3IONS-SCNC: 4 MMOL/L (ref 4–13)
BUN SERPL-MCNC: 9 MG/DL (ref 5–25)
CALCIUM SERPL-MCNC: 8.2 MG/DL (ref 8.3–10.1)
CHLORIDE SERPL-SCNC: 109 MMOL/L (ref 100–108)
CO2 SERPL-SCNC: 29 MMOL/L (ref 21–32)
CREAT SERPL-MCNC: 0.7 MG/DL (ref 0.6–1.3)
GFR SERPL CREATININE-BSD FRML MDRD: 111 ML/MIN/1.73SQ M
GLUCOSE SERPL-MCNC: 82 MG/DL (ref 65–140)
POTASSIUM SERPL-SCNC: 3.9 MMOL/L (ref 3.5–5.3)
SODIUM SERPL-SCNC: 142 MMOL/L (ref 136–145)

## 2021-05-09 PROCEDURE — 80048 BASIC METABOLIC PNL TOTAL CA: CPT | Performed by: STUDENT IN AN ORGANIZED HEALTH CARE EDUCATION/TRAINING PROGRAM

## 2021-05-09 RX ADMIN — DOCUSATE SODIUM AND SENNOSIDES 1 TABLET: 8.6; 5 TABLET ORAL at 17:48

## 2021-05-09 RX ADMIN — DICLOFENAC SODIUM 2 G: 10 GEL TOPICAL at 21:34

## 2021-05-09 RX ADMIN — ACETAMINOPHEN 975 MG: 325 TABLET ORAL at 21:28

## 2021-05-09 RX ADMIN — DULOXETINE HYDROCHLORIDE 60 MG: 60 CAPSULE, DELAYED RELEASE ORAL at 09:02

## 2021-05-09 RX ADMIN — POLYETHYLENE GLYCOL 3350 17 G: 17 POWDER, FOR SOLUTION ORAL at 09:02

## 2021-05-09 RX ADMIN — AMLODIPINE BESYLATE 5 MG: 5 TABLET ORAL at 09:02

## 2021-05-09 RX ADMIN — HYDROXYZINE HYDROCHLORIDE 25 MG: 25 TABLET, FILM COATED ORAL at 22:20

## 2021-05-09 RX ADMIN — OXYBUTYNIN CHLORIDE 5 MG: 5 TABLET ORAL at 09:03

## 2021-05-09 RX ADMIN — ACETAMINOPHEN 975 MG: 325 TABLET ORAL at 05:37

## 2021-05-09 RX ADMIN — PREGABALIN 25 MG: 25 CAPSULE ORAL at 09:03

## 2021-05-09 RX ADMIN — DOCUSATE SODIUM AND SENNOSIDES 1 TABLET: 8.6; 5 TABLET ORAL at 09:02

## 2021-05-09 RX ADMIN — OXYBUTYNIN CHLORIDE 5 MG: 5 TABLET ORAL at 17:48

## 2021-05-09 RX ADMIN — DICLOFENAC SODIUM 2 G: 10 GEL TOPICAL at 14:03

## 2021-05-09 RX ADMIN — NORTRIPTYLINE HYDROCHLORIDE 10 MG: 10 CAPSULE ORAL at 21:32

## 2021-05-09 RX ADMIN — MORPHINE SULFATE 15 MG: 15 TABLET, FILM COATED, EXTENDED RELEASE ORAL at 09:02

## 2021-05-09 RX ADMIN — BUSPIRONE HYDROCHLORIDE 15 MG: 10 TABLET ORAL at 09:03

## 2021-05-09 RX ADMIN — BUSPIRONE HYDROCHLORIDE 15 MG: 10 TABLET ORAL at 17:00

## 2021-05-09 RX ADMIN — QUETIAPINE FUMARATE 300 MG: 100 TABLET ORAL at 21:28

## 2021-05-09 RX ADMIN — PROPRANOLOL HYDROCHLORIDE 20 MG: 20 TABLET ORAL at 09:04

## 2021-05-09 RX ADMIN — PROPRANOLOL HYDROCHLORIDE 20 MG: 20 TABLET ORAL at 17:49

## 2021-05-09 RX ADMIN — ACETAMINOPHEN 975 MG: 325 TABLET ORAL at 14:01

## 2021-05-09 RX ADMIN — HEPARIN SODIUM 7500 UNITS: 5000 INJECTION INTRAVENOUS; SUBCUTANEOUS at 21:28

## 2021-05-09 RX ADMIN — PREGABALIN 25 MG: 25 CAPSULE ORAL at 21:27

## 2021-05-09 RX ADMIN — PANTOPRAZOLE SODIUM 40 MG: 40 TABLET, DELAYED RELEASE ORAL at 05:37

## 2021-05-09 RX ADMIN — PREGABALIN 200 MG: 100 CAPSULE ORAL at 21:27

## 2021-05-09 RX ADMIN — HEPARIN SODIUM 7500 UNITS: 5000 INJECTION INTRAVENOUS; SUBCUTANEOUS at 14:00

## 2021-05-09 RX ADMIN — DICLOFENAC SODIUM 2 G: 10 GEL TOPICAL at 09:04

## 2021-05-09 RX ADMIN — PREGABALIN 200 MG: 100 CAPSULE ORAL at 09:03

## 2021-05-09 RX ADMIN — Medication 1000 UNITS: at 09:04

## 2021-05-09 RX ADMIN — PRAZOSIN HYDROCHLORIDE 2 MG: 2 CAPSULE ORAL at 09:04

## 2021-05-09 RX ADMIN — BUSPIRONE HYDROCHLORIDE 15 MG: 10 TABLET ORAL at 21:26

## 2021-05-09 RX ADMIN — MORPHINE SULFATE 15 MG: 15 TABLET, FILM COATED, EXTENDED RELEASE ORAL at 21:28

## 2021-05-09 RX ADMIN — HEPARIN SODIUM 7500 UNITS: 5000 INJECTION INTRAVENOUS; SUBCUTANEOUS at 05:37

## 2021-05-09 NOTE — PROGRESS NOTES
INTERNAL MEDICINE RESIDENCY PROGRESS NOTE     Name: Cherylin Denver   Age & Sex: 60 y o  female   MRN: [id number]  Unit/Bed#: CW2 218-01   Encounter: [id number]  Team: SOD Team B     PATIENT INFORMATION     Name: Cherylin Denver   Age & Sex: 60 y o  female   MRN: [id number]  Hospital Stay Days: 7    ASSESSMENT/PLAN     Principal Problem:    Ambulatory dysfunction  Active Problems:    Fibromyalgia    Bipolar II disorder (HCC)    Generalized anxiety disorder    Hypertension    Insomnia    Migraine    Opioid dependence ([address] )    Left hip pain    Post traumatic stress disorder    Metatarsal fracture    Chronic back pain    Nausea      Nausea  Assessment & Plan  Patient reports nausea, comes and goes  Initially no change with Zofran x 1 or tums  No BM in 2 days  She has history of abdominal approach to back surgery in 2018, otherwise no abdominal surgeries  - continue Zofran PRN, TUMS PRN  - continue to monitor    Metatarsal fracture  Assessment & Plan  Mechanical fall 2 days ago, imaging at [facility] ER showed 2nd, 3rd, 4th non displaced fracture of metatarsals  Patient has been bearing weight on the right foot in ambulating around her house, this morning she reports the pain was too severe and she came into the ED, patient is requesting  Placement into a nursing facility     Xray obtained in ER reviewed: Unchanged nondisplaced fractures at the bases of the 2nd and 4th metatarsals  Small intra-articular fracture at the base of the 3rd metatarsal   No significant widening between the 1st and 2nd metatarsals to suggest Lisfranc injury  - podiatry consulted - no surgical intervention indicated  - NWB to RLE  - pain management with home Lyrica, morphine, Toradol 10mg PO for moderate pain (5 days), oxycodone 5mg BID PRN for severe pain  - rehab placement pending    Left hip pain  Assessment & Plan  History of bilateral hip OA  Patient reports worsening of chronic left hip pain since her fall   Notes pain is worse with laying on left side  Associated numbness and tingling on anterolateral thigh  No groin pain  ROM intact, no pain with internal or external rotation, though difficulty weight bearing per RN  - suspect trochanteric bursitis and possibly concurrent meralgia paresthetica based on exam and description  - doubt acute joint pathology at this time  - added back Toradol now that CALE resolved  - continue home pregabalin    Opioid dependence ([facility] [address] )  Assessment & Plan  PDMP reviewed  Receives pregabalin, MS Contin 15mg BID, and oxycodone 5mg daily PRN  - continue home regimen; oxy 5mg is ordered for severe pain BID PRN  - Toradol for moderate pain x 5 days  - monitor renal function given recent CALE with unclear trigger    Generalized anxiety disorder  Assessment & Plan  -continue home meds    Bipolar II disorder ([facility] [address] )  Assessment & Plan  -continue home meds    * Ambulatory dysfunction  Assessment & Plan  Present at baseline though worsened in the setting of metatarsal fracture  - rehab placement pending    Tachycardia-resolved as of 5/4/2021  Assessment & Plan  Tachycardic on vitals checks overnight  Not on tele  No history of arrhythmia  Patient denies symptoms  She was noted by nursing to be very anxious for much of the night, possibly related to her son visiting  In the morning, HR normal while sleeping   - likely related to anxiety  - monitor with routine vitals  - can add tele if recurrent    CALE (acute kidney injury) (HCC)-resolved as of 5/4/2021  Assessment & Plan  Creatinine has acutely risen to 1 5 on morning labs, from 1 0 the previous evening  BUN 6->12 so not clearly prerenal, but given low BP will try IV hydration   - hold morphine, replace with oxycodone  - hold NSAIDs  - IV hydration:  cc bolus   - resolved  - encourage PO fluids  - check UA  - monitor I/Os  - follow BMP in AM      Disposition: Pending placement  MA 51 to be signed Monday  SUBJECTIVE     Patient seen and examined   No acute events overnight  She reports that nausea comes and goes, currently not present  Denies any other complaints  OBJECTIVE     Vitals:    21 1755 21 1755 21 2334 21 0730   BP: 133/79 133/79 119/64 123/67   BP Location:    Left arm   Pulse: 65 65 64 66   Resp:   20 18   Temp:   98 4 °F (36 9 °C) 98 1 °F (36 7 °C)   TempSrc:    Oral   SpO2:  97% 95% 96%      Temperature:   Temp (24hrs), Av 3 °F (36 8 °C), Min:98 1 °F (36 7 °C), Max:98 4 °F (36 9 °C)    Temperature: 98 1 °F (36 7 °C)  Intake & Output:  I/O        07 -  07 -  07 07 - 05/10 0700    P  O  480 120     Total Intake 480 120     Urine 1550 1550     Total Output 1550 1550     Net -1070 -1430                Weights: There is no height or weight on file to calculate BMI  Weight (last 2 days)     None        Physical Exam  Constitutional:       Appearance: She is well-developed  HENT:      Head: Normocephalic and atraumatic  Eyes:      General: No scleral icterus  Conjunctiva/sclera: Conjunctivae normal    Cardiovascular:      Rate and Rhythm: Normal rate and regular rhythm  Heart sounds: Normal heart sounds  No murmur  Pulmonary:      Effort: Pulmonary effort is normal       Breath sounds: Normal breath sounds  No wheezing or rales  Abdominal:      General: Bowel sounds are normal  There is no distension  Palpations: Abdomen is soft  Tenderness: There is no abdominal tenderness  There is no guarding  Musculoskeletal:         General: No tenderness or deformity  Skin:     General: Skin is warm and dry  Findings: No erythema  Neurological:      Mental Status: She is alert and oriented to person, place, and time  Psychiatric:         Behavior: Behavior normal        LABORATORY DATA     Labs: I have personally reviewed pertinent reports    Results from last 7 days   Lab Units 21  0535 21  0639 21  0720 21   WBC Thousand/uL 4 27* 4  45 3 93* 8 51   HEMOGLOBIN g/dL 12 6 12 4 13 1 14 6   HEMATOCRIT % 39 3 38 7 40 5 42 4   PLATELETS Thousands/uL 274 251 266 318   NEUTROS PCT %  --   --   --  78*   MONOS PCT %  --   --   --  8      Results from last 7 days   Lab Units 05/09/21  0513 05/08/21  0535 05/07/21  0621   POTASSIUM mmol/L 3 9 3 4* 3 8   CHLORIDE mmol/L 109* 110* 108   CO2 mmol/L 29 28 32   BUN mg/dL 9 13 13   CREATININE mg/dL 0 70 0 74 0 75   CALCIUM mg/dL 8 2* 8 2* 8 8   ALK PHOS U/L  --  86  --    ALT U/L  --  30  --    AST U/L  --  18  --      Results from last 7 days   Lab Units 05/04/21  0720   MAGNESIUM mg/dL 2 4                        IMAGING & DIAGNOSTIC TESTING     Radiology Results: I have personally reviewed pertinent reports  Xr Foot 3+ Vw Left    Result Date: 5/4/2021  Impression: Severe hallux valgus deformity  Lisfranc alignment appears grossly maintained  Workstation performed: [id number]     Xr Foot 3+ Views Right    Result Date: 5/2/2021  Impression: Unchanged fractures of the 2nd and 4th metatarsals  Small intra-articular fracture base of 3rd metatarsal  Workstation performed: [id number]     Ct Lower Extremity Wo Contrast Right    Result Date: 5/4/2021  Impression: 1  Intact Lisfranc ligament  2   Mildly displaced fractures noted at the 2nd through 4th metatarsal bases without intra-articular component and without additional fracture identified  Workstation performed: [id number]     Other Diagnostic Testing: I have personally reviewed pertinent reports      ACTIVE MEDICATIONS     Current Facility-Administered Medications   Medication Dose Route Frequency    acetaminophen (TYLENOL) tablet 975 mg  975 mg Oral Q8H [address]    amLODIPine (NORVASC) tablet 5 mg  5 mg Oral Daily    busPIRone (BUSPAR) tablet 15 mg  15 mg Oral TID    calcium carbonate (TUMS) chewable tablet 500 mg  500 mg Oral Daily PRN    cholecalciferol (VITAMIN D3) tablet 1,000 Units  1,000 Units Oral Daily    Diclofenac Sodium (VOLTAREN) 1 % topical gel 2 g  2 g Topical 4x Daily    DULoxetine (CYMBALTA) delayed release capsule 60 mg  60 mg Oral Daily    heparin (porcine) subcutaneous injection 7,500 Units  7,500 Units Subcutaneous Q8H [address]    hydrOXYzine HCL (ATARAX) tablet 25 mg  25 mg Oral Q6H PRN    ibuprofen (MOTRIN) tablet 400 mg  400 mg Oral Q6H PRN    morphine (MS CONTIN) ER tablet 15 mg  15 mg Oral Q12H [address]    nortriptyline (PAMELOR) capsule 10 mg  10 mg Oral HS    ondansetron (ZOFRAN) injection 4 mg  4 mg Intravenous Q6H PRN    oxybutynin (DITROPAN) tablet 5 mg  5 mg Oral BID    oxyCODONE (ROXICODONE) IR tablet 5 mg  5 mg Oral Daily PRN    pantoprazole (PROTONIX) EC tablet 40 mg  40 mg Oral Early Morning    polyethylene glycol (MIRALAX) packet 17 g  17 g Oral Daily    prazosin (MINIPRESS) capsule 2 mg  2 mg Oral Daily    pregabalin (LYRICA) capsule 200 mg  200 mg Oral BID    And    pregabalin (LYRICA) capsule 25 mg  25 mg Oral BID    propranolol (INDERAL) tablet 20 mg  20 mg Oral BID    QUEtiapine (SEROquel) tablet 300 mg  300 mg Oral HS    senna-docusate sodium (SENOKOT S) 8 6-50 mg per tablet 1 tablet  1 tablet Oral BID    sodium chloride 0 9 % infusion  20 mL/hr Intravenous Continuous PRN    Valbenazine Tosylate CAPS 80 mg  80 mg Oral Daily       VTE Pharmacologic Prophylaxis: Heparin  VTE Mechanical Prophylaxis: sequential compression device    Portions of the record may have been created with voice recognition software  Occasional wrong word or "sound a like" substitutions may have occurred due to the inherent limitations of voice recognition software    Read the chart carefully and recognize, using context, where substitutions have occurred   ==  Maico Arellano DO  [address] [facility]  Internal Medicine Residency PGY-3

## 2021-05-10 RX ADMIN — ACETAMINOPHEN 975 MG: 325 TABLET ORAL at 13:44

## 2021-05-10 RX ADMIN — ACETAMINOPHEN 975 MG: 325 TABLET ORAL at 06:15

## 2021-05-10 RX ADMIN — BUSPIRONE HYDROCHLORIDE 15 MG: 10 TABLET ORAL at 21:12

## 2021-05-10 RX ADMIN — MORPHINE SULFATE 15 MG: 15 TABLET, FILM COATED, EXTENDED RELEASE ORAL at 21:14

## 2021-05-10 RX ADMIN — DICLOFENAC SODIUM 2 G: 10 GEL TOPICAL at 18:25

## 2021-05-10 RX ADMIN — QUETIAPINE FUMARATE 300 MG: 100 TABLET ORAL at 21:13

## 2021-05-10 RX ADMIN — DICLOFENAC SODIUM 2 G: 10 GEL TOPICAL at 12:06

## 2021-05-10 RX ADMIN — PROPRANOLOL HYDROCHLORIDE 20 MG: 20 TABLET ORAL at 11:29

## 2021-05-10 RX ADMIN — PANTOPRAZOLE SODIUM 40 MG: 40 TABLET, DELAYED RELEASE ORAL at 06:15

## 2021-05-10 RX ADMIN — OXYBUTYNIN CHLORIDE 5 MG: 5 TABLET ORAL at 18:23

## 2021-05-10 RX ADMIN — PREGABALIN 200 MG: 100 CAPSULE ORAL at 11:24

## 2021-05-10 RX ADMIN — BUSPIRONE HYDROCHLORIDE 15 MG: 10 TABLET ORAL at 16:44

## 2021-05-10 RX ADMIN — ACETAMINOPHEN 975 MG: 325 TABLET ORAL at 21:13

## 2021-05-10 RX ADMIN — AMLODIPINE BESYLATE 5 MG: 5 TABLET ORAL at 11:28

## 2021-05-10 RX ADMIN — HEPARIN SODIUM 7500 UNITS: 5000 INJECTION INTRAVENOUS; SUBCUTANEOUS at 13:46

## 2021-05-10 RX ADMIN — PREGABALIN 25 MG: 25 CAPSULE ORAL at 11:25

## 2021-05-10 RX ADMIN — HEPARIN SODIUM 7500 UNITS: 5000 INJECTION INTRAVENOUS; SUBCUTANEOUS at 21:12

## 2021-05-10 RX ADMIN — HEPARIN SODIUM 7500 UNITS: 5000 INJECTION INTRAVENOUS; SUBCUTANEOUS at 06:18

## 2021-05-10 RX ADMIN — Medication 1000 UNITS: at 11:28

## 2021-05-10 RX ADMIN — PREGABALIN 200 MG: 100 CAPSULE ORAL at 21:13

## 2021-05-10 RX ADMIN — BUSPIRONE HYDROCHLORIDE 15 MG: 10 TABLET ORAL at 11:27

## 2021-05-10 RX ADMIN — NORTRIPTYLINE HYDROCHLORIDE 10 MG: 10 CAPSULE ORAL at 21:16

## 2021-05-10 RX ADMIN — DICLOFENAC SODIUM 2 G: 10 GEL TOPICAL at 21:16

## 2021-05-10 RX ADMIN — OXYBUTYNIN CHLORIDE 5 MG: 5 TABLET ORAL at 11:26

## 2021-05-10 RX ADMIN — IBUPROFEN 400 MG: 400 TABLET ORAL at 11:36

## 2021-05-10 RX ADMIN — DULOXETINE HYDROCHLORIDE 60 MG: 60 CAPSULE, DELAYED RELEASE ORAL at 11:25

## 2021-05-10 RX ADMIN — PROPRANOLOL HYDROCHLORIDE 20 MG: 20 TABLET ORAL at 18:24

## 2021-05-10 RX ADMIN — MORPHINE SULFATE 15 MG: 15 TABLET, FILM COATED, EXTENDED RELEASE ORAL at 11:25

## 2021-05-10 RX ADMIN — PREGABALIN 25 MG: 25 CAPSULE ORAL at 21:14

## 2021-05-10 RX ADMIN — PRAZOSIN HYDROCHLORIDE 2 MG: 2 CAPSULE ORAL at 11:29

## 2021-05-10 NOTE — UTILIZATION REVIEW
Continued Stay Review    Date: 05/07/2021                         Current Patient Class: Inpatient  Current Level of Care: Med/Surg    HPI:55 y o  female initially admitted on 05/02/2021   Ambulatory dysfunction  Assessment/Plan:  Metatarsal fracture - Per Podiatry no surgical intervention  NWB to RLE  Analgesia scheduled and PRN  Reports nausea, check CMP in AM, continue zofran PRN, Continue to monitor        Vital Signs: BP (!) 154/108   Pulse 63   Temp 97 9 °F (36 6 °C) (Oral)   Resp 18   SpO2 97%       Pertinent Labs/Diagnostic Results:   Results from last 7 days   Lab Units 05/06/21  1704   SARS-COV-2  Negative     Results from last 7 days   Lab Units 05/08/21 0535 05/05/21  0639 05/04/21  0720   WBC Thousand/uL 4 27* 4 45 3 93*   HEMOGLOBIN g/dL 12 6 12 4 13 1   HEMATOCRIT % 39 3 38 7 40 5   PLATELETS Thousands/uL 274 251 266     Results from last 7 days   Lab Units 05/09/21 0513 05/08/21 0535 05/07/21 0621 05/06/21  0627 05/05/21  0639 05/04/21  0720   SODIUM mmol/L 142 142 141 142 143 141   POTASSIUM mmol/L 3 9 3 4* 3 8 4 2 3 9 3 7   CHLORIDE mmol/L 109* 110* 108 110* 110* 109*   CO2 mmol/L 29 28 32 27 27 28   ANION GAP mmol/L 4 4 1* 5 6 4   BUN mg/dL 9 13 13 15 15 15   CREATININE mg/dL 0 70 0 74 0 75 0 69 0 75 0 87   EGFR ml/min/1 73sq m 111 104 102 112 102 86   CALCIUM mg/dL 8 2* 8 2* 8 8 8 7 8 4 8 4   MAGNESIUM mg/dL  --   --   --   --   --  2 4     Results from last 7 days   Lab Units 05/09/21 0513 05/08/21  0535 05/07/21 0621 05/06/21  0627 05/05/21  0639 05/04/21  0720   GLUCOSE RANDOM mg/dL 82 84 82 79 77 89     Results from last 7 days   Lab Units 05/04/21  0725   CLARITY UA  Cloudy   COLOR UA  Dk Yellow   SPEC GRAV UA  1 026   PH UA  6 5   GLUCOSE UA mg/dl Negative   KETONES UA mg/dl Trace*   BLOOD UA  Negative   PROTEIN UA mg/dl Negative   NITRITE UA  Negative   BILIRUBIN UA  Interference- unable to analyze*   UROBILINOGEN UA E U /dl 1 0   LEUKOCYTES UA  Small*   WBC UA /hpf 10-20* RBC UA /hpf None Seen   BACTERIA UA /hpf Occasional   EPITHELIAL CELLS WET PREP /hpf Moderate*       Medications:   Scheduled Medications:  acetaminophen, 975 mg, Oral, Q8H [facility]  amLODIPine, 5 mg, Oral, Daily  busPIRone, 15 mg, Oral, TID  cholecalciferol, 1,000 Units, Oral, Daily  Diclofenac Sodium, 2 g, Topical, 4x Daily  DULoxetine, 60 mg, Oral, Daily  heparin (porcine), 7,500 Units, Subcutaneous, Q8H [facility]  morphine, 15 mg, Oral, Q12H [facility]  nortriptyline, 10 mg, Oral, HS  oxybutynin, 5 mg, Oral, BID  pantoprazole, 40 mg, Oral, Early Morning  polyethylene glycol, 17 g, Oral, Daily  prazosin, 2 mg, Oral, Daily  pregabalin, 200 mg, Oral, BID    And  pregabalin, 25 mg, Oral, BID  propranolol, 20 mg, Oral, BID  QUEtiapine, 300 mg, Oral, HS  senna-docusate sodium, 1 tablet, Oral, BID  Valbenazine Tosylate, 80 mg, Oral, Daily      Continuous IV Infusions:  sodium chloride, 20 mL/hr, Intravenous, Continuous PRN      PRN Meds:  calcium carbonate, 500 mg, Oral, Daily PRN  hydrOXYzine HCL, 25 mg, Oral, Q6H PRN  ibuprofen, 400 mg, Oral, Q6H PRN  ondansetron, 4 mg, Intravenous, Q6H PRN   X 1 dose 5/7  oxyCODONE, 5 mg, Oral, Daily PRN  sodium chloride, 20 mL/hr, Intravenous, Continuous PRN        Discharge Plan: D    Network Utilization Review Department  ATTENTION: Please call with any questions or concerns to [phone] and carefully listen to the prompts so that you are directed to the right person  All voicemails are confidential   Mony Chappell all requests for admission clinical reviews, approved or denied determinations and any other requests to dedicated fax number below belonging to the campus where the patient is receiving treatment   List of dedicated fax numbers for the Facilities:  [address] DENIALS (Administrative/Medical Necessity) [phone]   [address] (Maternity/NICU/Pediatrics) [phone]   5000 [facility] Kendall Smith [phone]   8049 [facility] [phone]   [facility] 5356 [address] [place] [address] [address] [phone] 4601 [facility] [phone]

## 2021-05-10 NOTE — PROGRESS NOTES
INTERNAL MEDICINE RESIDENCY PROGRESS NOTE     Name: Mata Salazar   Age & Sex: 60 y o  female   MRN: [id number]  Unit/Bed#: CW2 218-01   Encounter: [id number]  Team: SOD Team B     PATIENT INFORMATION     Name: Mata Salazar   Age & Sex: 60 y o  female   MRN: [id number]  Hospital Stay Days: 8    ASSESSMENT/PLAN     Principal Problem:    Ambulatory dysfunction  Active Problems:    Fibromyalgia    Bipolar II disorder (HCC)    Generalized anxiety disorder    Hypertension    Insomnia    Migraine    Opioid dependence ([facility] [address] )    Left hip pain    Post traumatic stress disorder    Metatarsal fracture    Chronic back pain    Nausea      Nausea  Assessment & Plan  Patient reports nausea, comes and goes  Initially no change with Zofran x 1 or tums  No BM in 2 days  She has history of abdominal approach to back surgery in 2018, otherwise no abdominal surgeries  - continue Zofran PRN, TUMS PRN  - continue to monitor    Metatarsal fracture  Assessment & Plan  Mechanical fall 2 days ago, imaging at [facility] ER showed 2nd, 3rd, 4th non displaced fracture of metatarsals  Patient has been bearing weight on the right foot in ambulating around her house, this morning she reports the pain was too severe and she came into the ED, patient is requesting  Placement into a nursing facility     Xray obtained in ER reviewed: Unchanged nondisplaced fractures at the bases of the 2nd and 4th metatarsals  Small intra-articular fracture at the base of the 3rd metatarsal   No significant widening between the 1st and 2nd metatarsals to suggest Lisfranc injury  - podiatry consulted - no surgical intervention indicated  - NWB to RLE  - pain management with home Lyrica, morphine, Toradol 10mg PO for moderate pain (5 days, now  with no doses given), oxycodone 5mg daily PRN for severe pain  - rehab placement pending    Left hip pain  Assessment & Plan  History of bilateral hip OA   Patient reports worsening of chronic left hip pain since her fall  Notes pain is worse with laying on left side  Associated numbness and tingling on anterolateral thigh  No groin pain  ROM intact, no pain with internal or external rotation, though difficulty weight bearing per RN  - suspect trochanteric bursitis and possibly concurrent meralgia paresthetica based on exam and description  - doubt acute joint pathology at this time  - Toradol order in place x 5 days, patient had not received any, can reorder if pain recurs vs consider other NSAID  - continue home pregabalin    Opioid dependence ([facility] [address] )  Assessment & Plan  PDMP reviewed  Receives pregabalin, MS Contin 15mg BID, and oxycodone 5mg daily PRN  - continue home regimen      Generalized anxiety disorder  Assessment & Plan  -continue home meds    Bipolar II disorder ([facility] [address] )  Assessment & Plan  -continue home meds    * Ambulatory dysfunction  Assessment & Plan  Present at baseline though worsened in the setting of metatarsal fracture  - rehab placement pending    Tachycardia-resolved as of 5/4/2021  Assessment & Plan  Tachycardic on vitals checks overnight  Not on tele  No history of arrhythmia  Patient denies symptoms  She was noted by nursing to be very anxious for much of the night, possibly related to her son visiting  In the morning, HR normal while sleeping   - likely related to anxiety  - monitor with routine vitals  - can add tele if recurrent    CALE (acute kidney injury) (HCC)-resolved as of 5/4/2021  Assessment & Plan  Creatinine has acutely risen to 1 5 on morning labs, from 1 0 the previous evening  BUN 6->12 so not clearly prerenal, but given low BP will try IV hydration   - hold morphine, replace with oxycodone  - hold NSAIDs  - IV hydration:  cc bolus   - resolved  - encourage PO fluids  - check UA  - monitor I/Os  - follow BMP in AM        Disposition: Pending placement     SUBJECTIVE     Patient seen and examined  No acute events overnight  She denies any complaints  Pain is at baseline   Eating ok, no significant nausea  OBJECTIVE     Vitals:    21 1610 21 2349 05/10/21 0829 05/10/21 1127   BP: 119/74 112/68 108/66 (!) 154/108   BP Location:   Left arm    Pulse: (!) 50 61 64 63   Resp:  18 18    Temp: 98 2 °F (36 8 °C)  97 9 °F (36 6 °C)    TempSrc:   Oral    SpO2: 99% 96% 96% 97%      Temperature:   Temp (24hrs), Av 1 °F (36 7 °C), Min:97 9 °F (36 6 °C), Max:98 2 °F (36 8 °C)    Temperature: 97 9 °F (36 6 °C)  Intake & Output:  I/O        07 -  07 07 - 05/10 0700 05/10 07 -  0700    P  O  120 240     Total Intake 120 240     Urine 1550 2275     Stool  0     Total Output 1550 2275     Net -1430 -2035            Unmeasured Stool Occurrence  1 x         Weights: There is no height or weight on file to calculate BMI  Weight (last 2 days)     None        Physical Exam  Constitutional:       Appearance: She is well-developed  HENT:      Head: Normocephalic and atraumatic  Eyes:      General: No scleral icterus  Conjunctiva/sclera: Conjunctivae normal    Cardiovascular:      Rate and Rhythm: Normal rate and regular rhythm  Heart sounds: Normal heart sounds  No murmur  Pulmonary:      Effort: Pulmonary effort is normal       Breath sounds: Normal breath sounds  No wheezing or rales  Abdominal:      General: Bowel sounds are normal  There is no distension  Palpations: Abdomen is soft  Tenderness: There is no abdominal tenderness  There is no guarding  Musculoskeletal:         General: No tenderness or deformity  Comments: Cast in place on RLE   Skin:     General: Skin is warm and dry  Findings: No erythema  Neurological:      General: No focal deficit present  Mental Status: She is alert and oriented to person, place, and time  Psychiatric:         Mood and Affect: Mood normal          Behavior: Behavior normal        LABORATORY DATA     Labs: I have personally reviewed pertinent reports    Results from last 7 days   Lab Units 05/08/21  0535 05/05/21  0639 05/04/21  0720   WBC Thousand/uL 4 27* 4 45 3 93*   HEMOGLOBIN g/dL 12 6 12 4 13 1   HEMATOCRIT % 39 3 38 7 40 5   PLATELETS Thousands/uL 274 251 266      Results from last 7 days   Lab Units 05/09/21  0513 05/08/21  0535 05/07/21  0621   POTASSIUM mmol/L 3 9 3 4* 3 8   CHLORIDE mmol/L 109* 110* 108   CO2 mmol/L 29 28 32   BUN mg/dL 9 13 13   CREATININE mg/dL 0 70 0 74 0 75   CALCIUM mg/dL 8 2* 8 2* 8 8   ALK PHOS U/L  --  86  --    ALT U/L  --  30  --    AST U/L  --  18  --      Results from last 7 days   Lab Units 05/04/21  0720   MAGNESIUM mg/dL 2 4                        IMAGING & DIAGNOSTIC TESTING     Radiology Results: I have personally reviewed pertinent reports  Xr Foot 3+ Vw Left    Result Date: 5/4/2021  Impression: Severe hallux valgus deformity  Lisfranc alignment appears grossly maintained  Workstation performed: [id number]     Xr Foot 3+ Views Right    Result Date: 5/2/2021  Impression: Unchanged fractures of the 2nd and 4th metatarsals  Small intra-articular fracture base of 3rd metatarsal  Workstation performed: [id number]     Ct Lower Extremity Wo Contrast Right    Result Date: 5/4/2021  Impression: 1  Intact Lisfranc ligament  2   Mildly displaced fractures noted at the 2nd through 4th metatarsal bases without intra-articular component and without additional fracture identified  Workstation performed: [id number]     Other Diagnostic Testing: I have personally reviewed pertinent reports      ACTIVE MEDICATIONS     Current Facility-Administered Medications   Medication Dose Route Frequency    acetaminophen (TYLENOL) tablet 975 mg  975 mg Oral Q8H [address]    amLODIPine (NORVASC) tablet 5 mg  5 mg Oral Daily    busPIRone (BUSPAR) tablet 15 mg  15 mg Oral TID    calcium carbonate (TUMS) chewable tablet 500 mg  500 mg Oral Daily PRN    cholecalciferol (VITAMIN D3) tablet 1,000 Units  1,000 Units Oral Daily    Diclofenac Sodium (VOLTAREN) 1 % topical gel 2 g  2 g Topical 4x Daily    DULoxetine (CYMBALTA) delayed release capsule 60 mg  60 mg Oral Daily    heparin (porcine) subcutaneous injection 7,500 Units  7,500 Units Subcutaneous Q8H [address]    hydrOXYzine HCL (ATARAX) tablet 25 mg  25 mg Oral Q6H PRN    ibuprofen (MOTRIN) tablet 400 mg  400 mg Oral Q6H PRN    morphine (MS CONTIN) ER tablet 15 mg  15 mg Oral Q12H [address]    nortriptyline (PAMELOR) capsule 10 mg  10 mg Oral HS    ondansetron (ZOFRAN) injection 4 mg  4 mg Intravenous Q6H PRN    oxybutynin (DITROPAN) tablet 5 mg  5 mg Oral BID    oxyCODONE (ROXICODONE) IR tablet 5 mg  5 mg Oral Daily PRN    pantoprazole (PROTONIX) EC tablet 40 mg  40 mg Oral Early Morning    polyethylene glycol (MIRALAX) packet 17 g  17 g Oral Daily    prazosin (MINIPRESS) capsule 2 mg  2 mg Oral Daily    pregabalin (LYRICA) capsule 200 mg  200 mg Oral BID    And    pregabalin (LYRICA) capsule 25 mg  25 mg Oral BID    propranolol (INDERAL) tablet 20 mg  20 mg Oral BID    QUEtiapine (SEROquel) tablet 300 mg  300 mg Oral HS    senna-docusate sodium (SENOKOT S) 8 6-50 mg per tablet 1 tablet  1 tablet Oral BID    sodium chloride 0 9 % infusion  20 mL/hr Intravenous Continuous PRN    Valbenazine Tosylate CAPS 80 mg  80 mg Oral Daily       VTE Pharmacologic Prophylaxis: Heparin  VTE Mechanical Prophylaxis: sequential compression device    Portions of the record may have been created with voice recognition software  Occasional wrong word or "sound a like" substitutions may have occurred due to the inherent limitations of voice recognition software    Read the chart carefully and recognize, using context, where substitutions have occurred   ==  Rehana Mcclendon DO  [facility]  Internal Medicine Residency PGY-3

## 2021-05-10 NOTE — CASE MANAGEMENT
Cm retrieved signed MA-51 from pt's paper chart  MA-51 and clinical information was faxed to the [facility] for a level II evaluation at [phone]  Cm will continue to follow for more information

## 2021-05-11 PROCEDURE — 97110 THERAPEUTIC EXERCISES: CPT

## 2021-05-11 PROCEDURE — 99222 1ST HOSP IP/OBS MODERATE 55: CPT | Performed by: PSYCHIATRY & NEUROLOGY

## 2021-05-11 PROCEDURE — 97535 SELF CARE MNGMENT TRAINING: CPT

## 2021-05-11 RX ADMIN — ACETAMINOPHEN 975 MG: 325 TABLET ORAL at 21:47

## 2021-05-11 RX ADMIN — PREGABALIN 25 MG: 25 CAPSULE ORAL at 09:43

## 2021-05-11 RX ADMIN — BUSPIRONE HYDROCHLORIDE 15 MG: 10 TABLET ORAL at 09:43

## 2021-05-11 RX ADMIN — BUSPIRONE HYDROCHLORIDE 15 MG: 10 TABLET ORAL at 21:55

## 2021-05-11 RX ADMIN — BUSPIRONE HYDROCHLORIDE 15 MG: 10 TABLET ORAL at 17:31

## 2021-05-11 RX ADMIN — Medication 1000 UNITS: at 09:43

## 2021-05-11 RX ADMIN — NORTRIPTYLINE HYDROCHLORIDE 10 MG: 10 CAPSULE ORAL at 21:48

## 2021-05-11 RX ADMIN — AMLODIPINE BESYLATE 5 MG: 5 TABLET ORAL at 09:43

## 2021-05-11 RX ADMIN — IBUPROFEN 400 MG: 400 TABLET ORAL at 06:42

## 2021-05-11 RX ADMIN — QUETIAPINE FUMARATE 300 MG: 100 TABLET ORAL at 21:47

## 2021-05-11 RX ADMIN — PROPRANOLOL HYDROCHLORIDE 20 MG: 20 TABLET ORAL at 09:42

## 2021-05-11 RX ADMIN — HEPARIN SODIUM 7500 UNITS: 5000 INJECTION INTRAVENOUS; SUBCUTANEOUS at 21:49

## 2021-05-11 RX ADMIN — PROPRANOLOL HYDROCHLORIDE 20 MG: 20 TABLET ORAL at 17:32

## 2021-05-11 RX ADMIN — MORPHINE SULFATE 15 MG: 15 TABLET, FILM COATED, EXTENDED RELEASE ORAL at 09:42

## 2021-05-11 RX ADMIN — ACETAMINOPHEN 975 MG: 325 TABLET ORAL at 14:36

## 2021-05-11 RX ADMIN — DICLOFENAC SODIUM 2 G: 10 GEL TOPICAL at 21:55

## 2021-05-11 RX ADMIN — PRAZOSIN HYDROCHLORIDE 2 MG: 2 CAPSULE ORAL at 09:42

## 2021-05-11 RX ADMIN — OXYBUTYNIN CHLORIDE 5 MG: 5 TABLET ORAL at 09:43

## 2021-05-11 RX ADMIN — DICLOFENAC SODIUM 2 G: 10 GEL TOPICAL at 09:44

## 2021-05-11 RX ADMIN — PREGABALIN 200 MG: 100 CAPSULE ORAL at 09:42

## 2021-05-11 RX ADMIN — DOCUSATE SODIUM AND SENNOSIDES 1 TABLET: 8.6; 5 TABLET ORAL at 17:31

## 2021-05-11 RX ADMIN — HEPARIN SODIUM 7500 UNITS: 5000 INJECTION INTRAVENOUS; SUBCUTANEOUS at 14:37

## 2021-05-11 RX ADMIN — MORPHINE SULFATE 15 MG: 15 TABLET, FILM COATED, EXTENDED RELEASE ORAL at 21:47

## 2021-05-11 RX ADMIN — ACETAMINOPHEN 975 MG: 325 TABLET ORAL at 06:42

## 2021-05-11 RX ADMIN — OXYBUTYNIN CHLORIDE 5 MG: 5 TABLET ORAL at 17:31

## 2021-05-11 RX ADMIN — PANTOPRAZOLE SODIUM 40 MG: 40 TABLET, DELAYED RELEASE ORAL at 06:43

## 2021-05-11 RX ADMIN — PREGABALIN 200 MG: 100 CAPSULE ORAL at 21:46

## 2021-05-11 RX ADMIN — DOCUSATE SODIUM AND SENNOSIDES 1 TABLET: 8.6; 5 TABLET ORAL at 09:42

## 2021-05-11 RX ADMIN — PREGABALIN 25 MG: 25 CAPSULE ORAL at 21:47

## 2021-05-11 RX ADMIN — POLYETHYLENE GLYCOL 3350 17 G: 17 POWDER, FOR SOLUTION ORAL at 09:42

## 2021-05-11 RX ADMIN — DULOXETINE HYDROCHLORIDE 60 MG: 60 CAPSULE, DELAYED RELEASE ORAL at 09:42
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
## 2021-05-11 NOTE — PROGRESS NOTES
INTERNAL MEDICINE RESIDENCY PROGRESS NOTE     Name: Mallika Hopkins   Age & Sex: 60 y o  female   MRN: [id number]  Unit/Bed#: CW2 218-01   Encounter: [id number]  Team: SOD Team B     PATIENT INFORMATION     Name: Mallika Hopkins   Age & Sex: 60 y o  female   MRN: [id number]  Hospital Stay Days: 9    ASSESSMENT/PLAN     Principal Problem:    Ambulatory dysfunction  Active Problems:    Fibromyalgia    Bipolar II disorder (HCC)    Generalized anxiety disorder    Hypertension    Insomnia    Migraine    Opioid dependence ([address] )    Left hip pain    Post traumatic stress disorder    Metatarsal fracture    Chronic back pain    Nausea      Nausea  Assessment & Plan  Patient reports nausea, comes and goes  Initially no change with Zofran x 1 or tums  No BM in 2 days  She has history of abdominal approach to back surgery in 2018, otherwise no abdominal surgeries  - continue Zofran PRN, TUMS PRN  - continue to monitor    Metatarsal fracture  Assessment & Plan  Mechanical fall 2 days ago, imaging at [facility] showed 2nd, 3rd, 4th non displaced fracture of metatarsals  Patient has been bearing weight on the right foot in ambulating around her house, this morning she reports the pain was too severe and she came into the ED, patient is requesting  Placement into a nursing facility     Xray obtained in ER reviewed: Unchanged nondisplaced fractures at the bases of the 2nd and 4th metatarsals  Small intra-articular fracture at the base of the 3rd metatarsal   No significant widening between the 1st and 2nd metatarsals to suggest Lisfranc injury  - podiatry consulted - no surgical intervention indicated  - NWB to RLE  - pain management with home Lyrica, morphine, Toradol 10mg PO for moderate pain (5 days, now  with no doses given), oxycodone 5mg daily PRN for severe pain  - rehab placement pending    Left hip pain  Assessment & Plan  History of bilateral hip OA   Patient reports worsening of chronic left hip pain since her fall  Notes pain is worse with laying on left side  Associated numbness and tingling on anterolateral thigh  No groin pain  ROM intact, no pain with internal or external rotation, though difficulty weight bearing per RN  - suspect trochanteric bursitis and possibly concurrent meralgia paresthetica based on exam and description  - doubt acute joint pathology at this time  - Toradol order in place x 5 days, patient had not received any, can reorder if pain recurs vs consider other NSAID  - continue home pregabalin    Opioid dependence ([facility] [address] )  Assessment & Plan  PDMP reviewed  Receives pregabalin, MS Contin 15mg BID, and oxycodone 5mg daily PRN  - continue home regimen      Generalized anxiety disorder  Assessment & Plan  -continue home meds    Bipolar II disorder [facility])  Assessment & Plan  Psych consult requested by [facility] for placement; consult placed 5/11  - continue home meds pending consult    * Ambulatory dysfunction  Assessment & Plan  Present at baseline though worsened in the setting of metatarsal fracture  - rehab placement pending    Tachycardia-resolved as of 5/4/2021  Assessment & Plan  Tachycardic on vitals checks overnight  Not on tele  No history of arrhythmia  Patient denies symptoms  She was noted by nursing to be very anxious for much of the night, possibly related to her son visiting  In the morning, HR normal while sleeping   - likely related to anxiety  - monitor with routine vitals  - can add tele if recurrent    CALE (acute kidney injury) (HCC)-resolved as of 5/4/2021  Assessment & Plan  Creatinine has acutely risen to 1 5 on morning labs, from 1 0 the previous evening   BUN 6->12 so not clearly prerenal, but given low BP will try IV hydration   - hold morphine, replace with oxycodone  - hold NSAIDs  - IV hydration:  cc bolus   - resolved  - encourage PO fluids  - check UA  - monitor I/Os  - follow BMP in AM        Disposition: Pending placement     SUBJECTIVE     Patient seen and examined  No acute events overnight  She has not been ambulating at all due to NWB status  Denies any complaints  No nausea, chest pain, or shortness of breath  OBJECTIVE     Vitals:    05/10/21 1612 05/10/21 1625 05/10/21 2341 21 0720   BP: 116/66  122/76 124/81   BP Location:   Left arm    Pulse: (!) 51 59 57 62   Resp: 18  19 18   Temp: 97 8 °F (36 6 °C)  98 1 °F (36 7 °C) 98 2 °F (36 8 °C)   TempSrc:   Oral    SpO2: 97%  95% 98%      Temperature:   Temp (24hrs), Av °F (36 7 °C), Min:97 8 °F (36 6 °C), Max:98 2 °F (36 8 °C)    Temperature: 98 2 °F (36 8 °C)  Intake & Output:  I/O        07 - 05/10 0700 05/10 0701 -  07 -  0700    P  O  240 1530 240    Total Intake 240 1530 240    Urine 2275 1300 378    Stool 0 0     Total Output 2275 1300 378    Net -203 +230 -138           Unmeasured Stool Occurrence 1 x 3 x         Weights: There is no height or weight on file to calculate BMI  Weight (last 2 days)     None        Physical Exam  Constitutional:       Appearance: She is well-developed  HENT:      Head: Normocephalic and atraumatic  Eyes:      General: No scleral icterus  Conjunctiva/sclera: Conjunctivae normal    Cardiovascular:      Rate and Rhythm: Normal rate and regular rhythm  Heart sounds: Normal heart sounds  No murmur  Pulmonary:      Effort: Pulmonary effort is normal       Breath sounds: Normal breath sounds  No wheezing or rales  Abdominal:      General: Bowel sounds are normal  There is no distension  Palpations: Abdomen is soft  Tenderness: There is no abdominal tenderness  There is no guarding  Musculoskeletal:         General: No tenderness or deformity  Skin:     General: Skin is warm and dry  Findings: No erythema  Neurological:      General: No focal deficit present  Mental Status: She is alert and oriented to person, place, and time     Psychiatric:         Mood and Affect: Mood normal  Behavior: Behavior normal        LABORATORY DATA     Labs: I have personally reviewed pertinent reports  Results from last 7 days   Lab Units 05/08/21  0535 05/05/21  0639   WBC Thousand/uL 4 27* 4 45   HEMOGLOBIN g/dL 12 6 12 4   HEMATOCRIT % 39 3 38 7   PLATELETS Thousands/uL 274 251      Results from last 7 days   Lab Units 05/09/21  0513 05/08/21  0535 05/07/21  0621   POTASSIUM mmol/L 3 9 3 4* 3 8   CHLORIDE mmol/L 109* 110* 108   CO2 mmol/L 29 28 32   BUN mg/dL 9 13 13   CREATININE mg/dL 0 70 0 74 0 75   CALCIUM mg/dL 8 2* 8 2* 8 8   ALK PHOS U/L  --  86  --    ALT U/L  --  30  --    AST U/L  --  18  --                             IMAGING & DIAGNOSTIC TESTING     Radiology Results: I have personally reviewed pertinent reports  Xr Foot 3+ Vw Left    Result Date: 5/4/2021  Impression: Severe hallux valgus deformity  Lisfranc alignment appears grossly maintained  Workstation performed: [id number]     Xr Foot 3+ Views Right    Result Date: 5/2/2021  Impression: Unchanged fractures of the 2nd and 4th metatarsals  Small intra-articular fracture base of 3rd metatarsal  Workstation performed: [id number]     Ct Lower Extremity Wo Contrast Right    Result Date: 5/4/2021  Impression: 1  Intact Lisfranc ligament  2   Mildly displaced fractures noted at the 2nd through 4th metatarsal bases without intra-articular component and without additional fracture identified  Workstation performed: [id number]     Other Diagnostic Testing: I have personally reviewed pertinent reports      ACTIVE MEDICATIONS     Current Facility-Administered Medications   Medication Dose Route Frequency    acetaminophen (TYLENOL) tablet 975 mg  975 mg Oral Q8H [facility]    amLODIPine (NORVASC) tablet 5 mg  5 mg Oral Daily    busPIRone (BUSPAR) tablet 15 mg  15 mg Oral TID    calcium carbonate (TUMS) chewable tablet 500 mg  500 mg Oral Daily PRN    cholecalciferol (VITAMIN D3) tablet 1,000 Units  1,000 Units Oral Daily    Diclofenac Sodium (VOLTAREN) 1 % topical gel 2 g  2 g Topical 4x Daily    DULoxetine (CYMBALTA) delayed release capsule 60 mg  60 mg Oral Daily    heparin (porcine) subcutaneous injection 7,500 Units  7,500 Units Subcutaneous Q8H [address]    hydrOXYzine HCL (ATARAX) tablet 25 mg  25 mg Oral Q6H PRN    ibuprofen (MOTRIN) tablet 400 mg  400 mg Oral Q6H PRN    morphine (MS CONTIN) ER tablet 15 mg  15 mg Oral Q12H [address]    nortriptyline (PAMELOR) capsule 10 mg  10 mg Oral HS    ondansetron (ZOFRAN) injection 4 mg  4 mg Intravenous Q6H PRN    oxybutynin (DITROPAN) tablet 5 mg  5 mg Oral BID    oxyCODONE (ROXICODONE) IR tablet 5 mg  5 mg Oral Daily PRN    pantoprazole (PROTONIX) EC tablet 40 mg  40 mg Oral Early Morning    polyethylene glycol (MIRALAX) packet 17 g  17 g Oral Daily    prazosin (MINIPRESS) capsule 2 mg  2 mg Oral Daily    pregabalin (LYRICA) capsule 200 mg  200 mg Oral BID    And    pregabalin (LYRICA) capsule 25 mg  25 mg Oral BID    propranolol (INDERAL) tablet 20 mg  20 mg Oral BID    QUEtiapine (SEROquel) tablet 300 mg  300 mg Oral HS    senna-docusate sodium (SENOKOT S) 8 6-50 mg per tablet 1 tablet  1 tablet Oral BID    sodium chloride 0 9 % infusion  20 mL/hr Intravenous Continuous PRN    Valbenazine Tosylate CAPS 80 mg  80 mg Oral Daily       VTE Pharmacologic Prophylaxis: Heparin  VTE Mechanical Prophylaxis: sequential compression device    Portions of the record may have been created with voice recognition software  Occasional wrong word or "sound a like" substitutions may have occurred due to the inherent limitations of voice recognition software    Read the chart carefully and recognize, using context, where substitutions have occurred   ==  Marina Hoskins DO  [address] [facility]  Internal Medicine Residency PGY-3

## 2021-05-11 NOTE — PLAN OF CARE
Problem: PHYSICAL THERAPY ADULT  Goal: Performs mobility at highest level of function for planned discharge setting  See evaluation for individualized goals  Description: Treatment/Interventions: ADL retraining, Functional transfer training, LE strengthening/ROM, Elevations, Therapeutic exercise, Endurance training, Cognitive reorientation, Patient/family training, Equipment eval/education, Bed mobility, Compensatory technique education, Continued evaluation, Spoke to nursing, OT  Equipment Recommended: Wheelchair       See flowsheet documentation for full assessment, interventions and recommendations  Outcome: Progressing  Note: Prognosis: Fair  Problem List: Decreased strength, Decreased endurance, Decreased mobility, Impaired balance, Decreased coordination, Decreased safety awareness, Pain, Orthopedic restrictions, Decreased cognition, Decreased range of motion  Assessment: Pt continues to required increased time to complete bed mobility with decreased strength  Tolerated sitting EOB with dynamic sitting balance  Required increased A to complete sit<>stand with decreased LLE strength  PT demonstrated good recall of RLE NWB precautions  Pt will benefit from continued inpt skilled PT to maximize functional mobility & safety  Barriers to Discharge: Inaccessible home environment        PT Discharge Recommendation: Post acute rehabilitation services     PT - OK to Discharge: Yes    See flowsheet documentation for full assessment

## 2021-05-11 NOTE — PLAN OF CARE
Problem: PAIN - ADULT  Goal: Verbalizes/displays adequate comfort level or baseline comfort level  Description: Interventions:  - Encourage patient to monitor pain and request assistance  - Assess pain using appropriate pain scale  - Administer analgesics based on type and severity of pain and evaluate response  - Implement non-pharmacological measures as appropriate and evaluate response  - Consider cultural and social influences on pain and pain management  - Notify physician/advanced practitioner if interventions unsuccessful or patient reports new pain  Outcome: Progressing     Problem: INFECTION - ADULT  Goal: Absence or prevention of progression during hospitalization  Description: INTERVENTIONS:  - Assess and monitor for signs and symptoms of infection  - Monitor lab/diagnostic results  - Monitor all insertion sites, i e  indwelling lines, tubes, and drains  - Monitor endotracheal if appropriate and nasal secretions for changes in amount and color  - [place] appropriate cooling/warming therapies per order  - Administer medications as ordered  - Instruct and encourage patient and family to use good hand hygiene technique  - Identify and instruct in appropriate isolation precautions for identified infection/condition  Outcome: Progressing  Goal: Absence of fever/infection during neutropenic period  Description: INTERVENTIONS:  - Monitor WBC    Outcome: Progressing     Problem: SAFETY ADULT  Goal: Patient will remain free of falls  Description: INTERVENTIONS:  - Assess patient frequently for physical needs  -  Identify cognitive and physical deficits and behaviors that affect risk of falls    -  [place] fall precautions as indicated by assessment   - Educate patient/family on patient safety including physical limitations  - Instruct patient to call for assistance with activity based on assessment  - Modify environment to reduce risk of injury  - Consider OT/PT consult to assist with strengthening/mobility  Outcome: Progressing  Goal: Maintain or return to baseline ADL function  Description: INTERVENTIONS:  -  Assess patient's ability to carry out ADLs; assess patient's baseline for ADL function and identify physical deficits which impact ability to perform ADLs (bathing, care of mouth/teeth, toileting, grooming, dressing, etc )  - Assess/evaluate cause of self-care deficits   - Assess range of motion  - Assess patient's mobility; develop plan if impaired  - Assess patient's need for assistive devices and provide as appropriate  - Encourage maximum independence but intervene and supervise when necessary  - Involve family in performance of ADLs  - Assess for home care needs following discharge   - Consider OT consult to assist with ADL evaluation and planning for discharge  - Provide patient education as appropriate  Outcome: Progressing  Goal: Maintain or return mobility status to optimal level  Description: INTERVENTIONS:  - Assess patient's baseline mobility status (ambulation, transfers, stairs, etc )    - Identify cognitive and physical deficits and behaviors that affect mobility  - Identify mobility aids required to assist with transfers and/or ambulation (gait belt, sit-to-stand, lift, walker, cane, etc )  - [place] fall precautions as indicated by assessment  - Record patient progress and toleration of activity level on Mobility SBAR; progress patient to next Phase/Stage  - Instruct patient to call for assistance with activity based on assessment  - Consider rehabilitation consult to assist with strengthening/weightbearing, etc   Outcome: Progressing     Problem: DISCHARGE PLANNING  Goal: Discharge to home or other facility with appropriate resources  Description: INTERVENTIONS:  - Identify barriers to discharge w/patient and caregiver  - Arrange for needed discharge resources and transportation as appropriate  - Identify discharge learning needs (meds, wound care, etc )  - Arrange for interpretive services to assist at discharge as needed  - Refer to Case Management Department for coordinating discharge planning if the patient needs post-hospital services based on physician/advanced practitioner order or complex needs related to functional status, cognitive ability, or social support system  Outcome: Progressing     Problem: Knowledge Deficit  Goal: Patient/family/caregiver demonstrates understanding of disease process, treatment plan, medications, and discharge instructions  Description: Complete learning assessment and assess knowledge base  Interventions:  - Provide teaching at level of understanding  - Provide teaching via preferred learning methods  Outcome: Progressing     Problem: Potential for Falls  Goal: Patient will remain free of falls  Description: INTERVENTIONS:  - Assess patient frequently for physical needs  -  Identify cognitive and physical deficits and behaviors that affect risk of falls    -  [place] fall precautions as indicated by assessment   - Educate patient/family on patient safety including physical limitations  - Instruct patient to call for assistance with activity based on assessment  - Modify environment to reduce risk of injury  - Consider OT/PT consult to assist with strengthening/mobility  Outcome: Progressing     Problem: Prexisting or High Potential for Compromised Skin Integrity  Goal: Skin integrity is maintained or improved  Description: INTERVENTIONS:  - Identify patients at risk for skin breakdown  - Assess and monitor skin integrity  - Assess and monitor nutrition and hydration status  - Monitor labs   - Assess for incontinence   - Turn and reposition patient  - Assist with mobility/ambulation  - Relieve pressure over bony prominences  - Avoid friction and shearing  - Provide appropriate hygiene as needed including keeping skin clean and dry  - Evaluate need for skin moisturizer/barrier cream  - Collaborate with interdisciplinary team   - Patient/family teaching  - Consider wound care consult   Outcome: Progressing

## 2021-05-11 NOTE — CASE MANAGEMENT
Pt was seen by Dr Norm Childs for a psych eval, as requested by the [facility] for level II determination  Psych eval was faxed to the [facility] at [phone]

## 2021-05-11 NOTE — PLAN OF CARE
Problem: OCCUPATIONAL THERAPY ADULT  Goal: Performs self-care activities at highest level of function for planned discharge setting  See evaluation for individualized goals  Description: Treatment Interventions: ADL retraining          See flowsheet documentation for full assessment, interventions and recommendations  Outcome: Progressing  Note: Limitation: Decreased ADL status, Decreased Safe judgement during ADL, Decreased endurance, Decreased self-care trans, Decreased high-level ADLs  Prognosis: Fair  Assessment: Pt participated in occupational therapy with focus on activity tolerance, bed mob, unsupported sitting balance and tolerance for pt engagement in functional self-care task/UB and LB self-care and functional transfers/standing tolerance and balance for pt ADL  Pt cleared by RN/Loan for pt participation in occupational therapy  Pt received HOB raised/supine pt sitting upright and agreeable to therapy following pt Identifiers confirmed  Pt required assist for  UB/LB self-care/bathing dressing and grooming while seated at edge of pt bed 2* pt limited by Pain, endurance,  functional mobility  Pt able to complete standing tolerance at edge of pt bed with assist x 1 to maintain NWB to pt R LE    Pt will require post acute rehab services to continue to address noted pt deficits  With decreased coordination, balance and strength which currently impair pt ADL and functional mob     OT Discharge Recommendation: Post acute rehabilitation services  OT - OK to Discharge: Yes(when medically stable)

## 2021-05-11 NOTE — OCCUPATIONAL THERAPY NOTE
Occupational Therapy Treatment Note       05/11/21 1404   OT Last Visit   OT Visit Date 05/11/21   Note Type   Note Type Treatment   Restrictions/Precautions   Weight Bearing Precautions Per Order Yes   RLE Weight Bearing Per Order NWB   Braces or Orthoses Splint; Other (Comment)  (RLE splint)   Lifestyle   Autonomy I ADLs, assist IADLS, Mod I w/ transfers and functional mobility PTA   Reciprocal Relationships Pt lives w/ her son and aunt; he is not home during the day  He is the manager of    Service to Others Unemployed   [address] Enjoys being more active   Pain Assessment   Pain Assessment Tool 0-10   ADL   Where Assessed Edge of bed   Grooming Assistance 5  Supervision/Setup   Grooming Deficit Setup;Supervision/safety   UB Bathing Assistance 5  Supervision/Setup   UB Bathing Deficit Setup; Increased time to complete   LB Bathing Assistance 2  Maximal Assistance   LB Bathing Deficit Buttocks   UB Dressing Assistance 5  Supervision/Setup   UB Dressing Deficit Setup; Increased time to complete   Bed Mobility   Supine to Sit 5  Supervision   Additional items Increased time required   Sit to Supine 5  Supervision   Additional items Increased time required   Transfers   Sit to Stand 3  Moderate assistance   Additional items Assist x 1   Stand to Sit 3  Moderate assistance   Additional items Assist x 1   Cognition   Overall Cognitive Status Impaired   Arousal/Participation Responsive; Cooperative   Attention Attends with cues to redirect   Orientation Level Oriented X4   Memory Decreased recall of precautions;Decreased recall of recent events   Following Commands Follows one step commands without difficulty   Activity Tolerance   Activity Tolerance Patient limited by pain; Patient limited by fatigue   Assessment   Assessment Pt participated in occupational therapy with focus on activity tolerance, bed mob, unsupported sitting balance and tolerance for pt engagement in functional self-care task/UB and LB self-care and functional transfers/standing tolerance and balance for pt ADL  Pt cleared by RN/Loan for pt participation in occupational therapy  Pt received HOB raised/supine pt sitting upright and agreeable to therapy following pt Identifiers confirmed  Pt required assist for  UB/LB self-care/bathing dressing and grooming while seated at edge of pt bed 2* pt limited by Pain, endurance,  functional mobility  Pt able to complete standing tolerance at edge of pt bed with assist x 1 to maintain NWB to pt R LE    Pt will require post acute rehab services to continue to address noted pt deficits  With decreased coordination, balance and strength which currently impair pt ADL and functional mob   Plan   Treatment Interventions ADL retraining   Goal Expiration Date 05/17/21   OT Treatment Day 2   OT Frequency 3-5x/wk   Recommendation   OT Discharge Recommendation Post acute rehabilitation services   AM-PAC Daily Activity Inpatient   Lower Body Dressing 2   Bathing 3   Toileting 2   Upper Body Dressing 3   Grooming 4   Eating 4   Daily Activity Raw Score 18   Daily Activity Standardized Score (Calc for Raw Score >=11) 38 66   AM-PAC Applied Cognition Inpatient   Following a Speech/Presentation 3   Understanding Ordinary Conversation 4   Taking Medications 4   Remembering Where Things Are Placed or Put Away 3   Remembering List of 4-5 Errands 3   Taking Care of Complicated Tasks 2   Applied Cognition Raw Score 19   Applied Cognition Standardized Score 39 77   Barthel Index   Feeding 10   Bathing 0   Grooming Score 5   Dressing Score 5   Bladder Score 0   Bowels Score 0   Toilet Use Score 5   Transfers (Bed/Chair) Score 10   Mobility (Level Surface) Score 0   Stairs Score 0   Barthel Index Score 35     Marline TERESA/HAVEN

## 2021-05-11 NOTE — PHYSICAL THERAPY NOTE
PHYSICAL THERAPY NOTE          Patient Name: Luis Eduardo Schaffer  USCVB'W Date: 5/11/2021 05/11/21 1425   PT Last Visit   PT Visit Date 05/11/21   Note Type   Note Type Treatment   Pain Assessment   Pain Assessment Tool 0-10   Pain Score 6   Restrictions/Precautions   Weight Bearing Precautions Per Order Yes   RLE Weight Bearing Per Order NWB   Braces or Orthoses Splint  (RLE splint)   General   Chart Reviewed Yes   Family/Caregiver Present No   Cognition   Orientation Level Oriented X4   Subjective   Subjective Pt willing and agreeable to PT session   Bed Mobility   Supine to Sit 5  Supervision   Additional items Increased time required   Sit to Supine 5  Supervision   Additional items Increased time required   Transfers   Sit to Stand 3  Moderate assistance   Additional items Assist x 1   Stand to Sit 3  Moderate assistance   Additional items Assist x 1   Sit pivot 3  Moderate assistance   Additional items Assist x 1; Increased time required   Ambulation/Elevation   Gait pattern Not appropriate   Balance   Static Sitting Fair   Dynamic Sitting Fair -   Static Standing Poor -   Endurance Deficit   Endurance Deficit Yes   Endurance Deficit Description limited by fatigue, pain, NWB precautions   Activity Tolerance   Activity Tolerance Patient limited by fatigue;Patient limited by pain   Medical Staff Made Aware OT   Nurse Made Aware yes, nsg gave clearance to work with pt   Exercises   THR AROM;20 reps; Sitting;Bilateral   Assessment   Prognosis Fair   Problem List Decreased strength;Decreased endurance;Decreased mobility; Impaired balance;Decreased coordination;Decreased safety awareness;Pain;Orthopedic restrictions;Decreased cognition;Decreased range of motion   Assessment Pt continues to required increased time to complete bed mobility with decreased strength  Tolerated sitting EOB with dynamic sitting balance   Required increased A to complete sit<>stand with decreased LLE strength  PT demonstrated good recall of RLE NWB precautions  Pt will benefit from continued inpt skilled PT to maximize functional mobility & safety  Barriers to Discharge Inaccessible home environment   Goals   Patient Goals To continue to progress to use rollator again   STG Expiration Date 05/21/21   Short Term Goal #1 1  Complete bed mobility I to decrease caregiver burden  2  Complete transfers Mod I to decrease caregiver burden  3  Imrpove LE strength to 4/5 to improve transfer tolerance  Plan   Treatment/Interventions OT; Spoke to case management;Spoke to nursing;Gait training;Bed mobility; Patient/family training; Endurance training;LE strengthening/ROM; Functional transfer training   Progress Progressing toward goals   PT Frequency Other (Comment)  (3-5x/wk)   Recommendation   PT Discharge Recommendation Post acute rehabilitation services   Equipment Recommended Wheelchair   PT - OK to Discharge Yes   AM-PAC Basic Mobility Inpatient   Turning in Bed Without Bedrails 3   Lying on Back to Sitting on Edge of Flat Bed 3   Moving Bed to Chair 1   Standing Up From Chair 1   Walk in Room 1   Climb 3-5 Stairs 1   Basic Mobility Inpatient Raw Score 10   Turning Head Towards Sound 4   Follow Simple Instructions 3   Low Function Basic Mobility Raw Score 17   Low Function Basic Mobility Standardized Score 27 46     Everardo Segura, PT

## 2021-05-12 ENCOUNTER — PATIENT OUTREACH (OUTPATIENT)
Dept: INTERNAL MEDICINE CLINIC | Facility: CLINIC | Age: 56
End: 2021-05-12

## 2021-05-12 ENCOUNTER — TELEPHONE (OUTPATIENT)
Dept: INTERNAL MEDICINE CLINIC | Facility: CLINIC | Age: 56
End: 2021-05-12

## 2021-05-12 PROCEDURE — 97530 THERAPEUTIC ACTIVITIES: CPT

## 2021-05-12 RX ADMIN — IBUPROFEN 400 MG: 400 TABLET ORAL at 15:07

## 2021-05-12 RX ADMIN — BUSPIRONE HYDROCHLORIDE 15 MG: 10 TABLET ORAL at 22:22

## 2021-05-12 RX ADMIN — MORPHINE SULFATE 15 MG: 15 TABLET, FILM COATED, EXTENDED RELEASE ORAL at 08:29

## 2021-05-12 RX ADMIN — DOCUSATE SODIUM AND SENNOSIDES 1 TABLET: 8.6; 5 TABLET ORAL at 17:08

## 2021-05-12 RX ADMIN — OXYBUTYNIN CHLORIDE 5 MG: 5 TABLET ORAL at 08:29

## 2021-05-12 RX ADMIN — QUETIAPINE FUMARATE 300 MG: 100 TABLET ORAL at 22:22

## 2021-05-12 RX ADMIN — Medication 1000 UNITS: at 08:29

## 2021-05-12 RX ADMIN — DICLOFENAC SODIUM 2 G: 10 GEL TOPICAL at 08:27

## 2021-05-12 RX ADMIN — NORTRIPTYLINE HYDROCHLORIDE 10 MG: 10 CAPSULE ORAL at 22:21

## 2021-05-12 RX ADMIN — PROPRANOLOL HYDROCHLORIDE 20 MG: 20 TABLET ORAL at 08:28

## 2021-05-12 RX ADMIN — POLYETHYLENE GLYCOL 3350 17 G: 17 POWDER, FOR SOLUTION ORAL at 08:30

## 2021-05-12 RX ADMIN — DICLOFENAC SODIUM 2 G: 10 GEL TOPICAL at 22:23

## 2021-05-12 RX ADMIN — MORPHINE SULFATE 15 MG: 15 TABLET, FILM COATED, EXTENDED RELEASE ORAL at 22:23

## 2021-05-12 RX ADMIN — PANTOPRAZOLE SODIUM 40 MG: 40 TABLET, DELAYED RELEASE ORAL at 06:28

## 2021-05-12 RX ADMIN — DOCUSATE SODIUM AND SENNOSIDES 1 TABLET: 8.6; 5 TABLET ORAL at 08:29

## 2021-05-12 RX ADMIN — PREGABALIN 200 MG: 100 CAPSULE ORAL at 08:29

## 2021-05-12 RX ADMIN — HEPARIN SODIUM 7500 UNITS: 5000 INJECTION INTRAVENOUS; SUBCUTANEOUS at 13:31

## 2021-05-12 RX ADMIN — DULOXETINE HYDROCHLORIDE 60 MG: 60 CAPSULE, DELAYED RELEASE ORAL at 08:29

## 2021-05-12 RX ADMIN — HEPARIN SODIUM 7500 UNITS: 5000 INJECTION INTRAVENOUS; SUBCUTANEOUS at 06:28

## 2021-05-12 RX ADMIN — BUSPIRONE HYDROCHLORIDE 15 MG: 10 TABLET ORAL at 17:08

## 2021-05-12 RX ADMIN — PREGABALIN 200 MG: 100 CAPSULE ORAL at 22:22

## 2021-05-12 RX ADMIN — ACETAMINOPHEN 975 MG: 325 TABLET ORAL at 06:28

## 2021-05-12 RX ADMIN — BUSPIRONE HYDROCHLORIDE 15 MG: 10 TABLET ORAL at 08:29

## 2021-05-12 RX ADMIN — OXYBUTYNIN CHLORIDE 5 MG: 5 TABLET ORAL at 17:08

## 2021-05-12 RX ADMIN — HEPARIN SODIUM 7500 UNITS: 5000 INJECTION INTRAVENOUS; SUBCUTANEOUS at 22:23

## 2021-05-12 RX ADMIN — PREGABALIN 25 MG: 25 CAPSULE ORAL at 08:30

## 2021-05-12 RX ADMIN — AMLODIPINE BESYLATE 5 MG: 5 TABLET ORAL at 08:29

## 2021-05-12 RX ADMIN — ACETAMINOPHEN 975 MG: 325 TABLET ORAL at 22:23

## 2021-05-12 RX ADMIN — PROPRANOLOL HYDROCHLORIDE 20 MG: 20 TABLET ORAL at 17:08

## 2021-05-12 RX ADMIN — PRAZOSIN HYDROCHLORIDE 2 MG: 2 CAPSULE ORAL at 08:28

## 2021-05-12 RX ADMIN — ACETAMINOPHEN 975 MG: 325 TABLET ORAL at 13:31

## 2021-05-12 RX ADMIN — PREGABALIN 25 MG: 25 CAPSULE ORAL at 22:22

## 2021-05-12 NOTE — PLAN OF CARE
Problem: PHYSICAL THERAPY ADULT  Goal: Performs mobility at highest level of function for planned discharge setting  See evaluation for individualized goals  Description: Treatment/Interventions: ADL retraining, Functional transfer training, LE strengthening/ROM, Elevations, Therapeutic exercise, Endurance training, Cognitive reorientation, Patient/family training, Equipment eval/education, Bed mobility, Compensatory technique education, Continued evaluation, Spoke to nursing, OT  Equipment Recommended: Wheelchair       See flowsheet documentation for full assessment, interventions and recommendations  Note: Prognosis: Fair  Problem List: Decreased strength, Decreased endurance, Impaired balance, Decreased mobility, Pain, Orthopedic restrictions  Assessment: Pt seen for session for setup, bed mob, time spent EOB, standing trials, return to supine and time for getting on/off bedpan  Pt cooperative w/ session  Impulsive but cooperative  Note improved bed mob, sitting tolerance, but continues to be impulsive w/ standing  continue to recommend rehab at d/c  Barriers to Discharge: Inaccessible home environment        PT Discharge Recommendation: Post acute rehabilitation services     PT - OK to Discharge: (S) Yes(when stable to rehab)    See flowsheet documentation for full assessment

## 2021-05-12 NOTE — PROGRESS NOTES
CHW spoke to Chappell Form from the clinic and she stated the paperwork for Disability Verification form has not been completed by doctor  She was going to send a message to the doctor again today and will keep me posted once the paper is completed and faxed to [address] [facility]  CHW spoke to patient on this date and she said she is feeling a little better and trying to heal at the Rehab home  Pt states she was unable to find her Social Security Card  A Social Security Application SS-5 completed and mailed out       CHW will continue to work with pt and communicate w/ team

## 2021-05-12 NOTE — CASE MANAGEMENT
CM continues to await a determination from the [facility] for options  CM will continue to follow for dcp

## 2021-05-12 NOTE — PROGRESS NOTES
INTERNAL MEDICINE RESIDENCY PROGRESS NOTE     Name: Rosy Vera   Age & Sex: 60 y o  female   MRN: [id number]  Unit/Bed#: CW2 218-01   Encounter: [id number]  Team: SOD Team B     PATIENT INFORMATION     Name: Rosy Vera   Age & Sex: 60 y o  female   MRN: [id number]  Hospital Stay Days: 10    ASSESSMENT/PLAN     Principal Problem:    Ambulatory dysfunction  Active Problems:    Fibromyalgia    Bipolar II disorder (HCC)    Generalized anxiety disorder    Hypertension    Insomnia    Migraine    Opioid dependence ([facility] [address] )    Left hip pain    Post traumatic stress disorder    Metatarsal fracture    Chronic back pain    Nausea      Nausea  Assessment & Plan  Patient reports nausea, comes and goes  Initially no change with Zofran x 1 or tums  No BM in 2 days  She has history of abdominal approach to back surgery in 2018, otherwise no abdominal surgeries  - continue Zofran PRN, TUMS PRN  - continue to monitor    Metatarsal fracture  Assessment & Plan  Mechanical fall 2 days ago, imaging at [facility] ER showed 2nd, 3rd, 4th non displaced fracture of metatarsals  Patient has been bearing weight on the right foot in ambulating around her house, this morning she reports the pain was too severe and she came into the ED, patient is requesting  Placement into a nursing facility     Xray obtained in ER reviewed: Unchanged nondisplaced fractures at the bases of the 2nd and 4th metatarsals  Small intra-articular fracture at the base of the 3rd metatarsal   No significant widening between the 1st and 2nd metatarsals to suggest Lisfranc injury  - podiatry consulted - no surgical intervention indicated  - NWB to RLE  - pain management with home Lyrica, morphine, Toradol 10mg PO for moderate pain (5 days, now  with no doses given), oxycodone 5mg daily PRN for severe pain  - rehab placement pending    Left hip pain  Assessment & Plan  History of bilateral hip OA   Patient reports worsening of chronic left hip pain since her fall  Notes pain is worse with laying on left side  Associated numbness and tingling on anterolateral thigh  No groin pain  ROM intact, no pain with internal or external rotation, though difficulty weight bearing per RN  - suspect trochanteric bursitis and possibly concurrent meralgia paresthetica based on exam and description  - doubt acute joint pathology at this time  - Toradol order in place x 5 days, patient had not received any, can reorder if pain recurs vs consider other NSAID  - continue home pregabalin    Opioid dependence ([facility] [address] )  Assessment & Plan  PDMP reviewed  Receives pregabalin, MS Contin 15mg BID, and oxycodone 5mg daily PRN  - continue home regimen      Generalized anxiety disorder  Assessment & Plan  -continue home meds    Bipolar II disorder [facility])  Assessment & Plan  Psych consult requested by [facility] for placement; consult placed 5/11  - psychiatry team has cleared patient for discharge to rehab    * Ambulatory dysfunction  Assessment & Plan  Present at baseline though worsened in the setting of metatarsal fracture  - rehab placement pending    Tachycardia-resolved as of 5/4/2021  Assessment & Plan  Tachycardic on vitals checks overnight  Not on tele  No history of arrhythmia  Patient denies symptoms  She was noted by nursing to be very anxious for much of the night, possibly related to her son visiting  In the morning, HR normal while sleeping   - likely related to anxiety  - monitor with routine vitals  - can add tele if recurrent    CALE (acute kidney injury) (HCC)-resolved as of 5/4/2021  Assessment & Plan  Creatinine has acutely risen to 1 5 on morning labs, from 1 0 the previous evening   BUN 6->12 so not clearly prerenal, but given low BP will try IV hydration   - hold morphine, replace with oxycodone  - hold NSAIDs  - IV hydration:  cc bolus   - resolved  - encourage PO fluids  - check UA  - monitor I/Os  - follow BMP in AM        Disposition: Pending placement SUBJECTIVE     Patient seen and examined  No acute events overnight  She reports that she slept well  States pain is "mild " Denies CP or SOB  No complaints  OBJECTIVE     Vitals:    21 2328 21 0708 21 0827 21 0828   BP: 140/85 99/61 113/70 113/70   Pulse:  (!) 46 59 62   Resp: 18 18     Temp:  97 9 °F (36 6 °C)     TempSrc:       SpO2:  100%  96%      Temperature:   Temp (24hrs), Av 2 °F (36 8 °C), Min:97 9 °F (36 6 °C), Max:98 4 °F (36 9 °C)    Temperature: 97 9 °F (36 6 °C)  Intake & Output:  I/O       05/10 07 -  0700  07 -  07 07 -  0700    P  O  1530 720 120    Total Intake 1530 720 120    Urine 1300 2178 300    Stool 0      Total Output 1300 2178 300    Net +230 -1458 -180           Unmeasured Stool Occurrence 3 x          Weights: There is no height or weight on file to calculate BMI  Weight (last 2 days)     None        Physical Exam  Constitutional:       Appearance: She is well-developed  HENT:      Head: Normocephalic and atraumatic  Eyes:      General: No scleral icterus  Conjunctiva/sclera: Conjunctivae normal    Cardiovascular:      Rate and Rhythm: Normal rate and regular rhythm  Heart sounds: Normal heart sounds  No murmur  Pulmonary:      Effort: Pulmonary effort is normal       Breath sounds: Normal breath sounds  No wheezing or rales  Abdominal:      General: Bowel sounds are normal  There is no distension  Palpations: Abdomen is soft  Tenderness: There is no abdominal tenderness  There is no guarding  Musculoskeletal:         General: No tenderness or deformity  Comments: RLE in cast   Skin:     General: Skin is warm and dry  Findings: No erythema  Neurological:      General: No focal deficit present  Mental Status: She is alert and oriented to person, place, and time     Psychiatric:         Mood and Affect: Mood normal          Behavior: Behavior normal        LABORATORY DATA     Labs: I have personally reviewed pertinent reports  Results from last 7 days   Lab Units 05/08/21  0535   WBC Thousand/uL 4 27*   HEMOGLOBIN g/dL 12 6   HEMATOCRIT % 39 3   PLATELETS Thousands/uL 274      Results from last 7 days   Lab Units 05/09/21  0513 05/08/21  0535 05/07/21  0621   POTASSIUM mmol/L 3 9 3 4* 3 8   CHLORIDE mmol/L 109* 110* 108   CO2 mmol/L 29 28 32   BUN mg/dL 9 13 13   CREATININE mg/dL 0 70 0 74 0 75   CALCIUM mg/dL 8 2* 8 2* 8 8   ALK PHOS U/L  --  86  --    ALT U/L  --  30  --    AST U/L  --  18  --                             IMAGING & DIAGNOSTIC TESTING     Radiology Results: I have personally reviewed pertinent reports  Xr Foot 3+ Vw Left    Result Date: 5/4/2021  Impression: Severe hallux valgus deformity  Lisfranc alignment appears grossly maintained  Workstation performed: [id number]     Xr Foot 3+ Views Right    Result Date: 5/2/2021  Impression: Unchanged fractures of the 2nd and 4th metatarsals  Small intra-articular fracture base of 3rd metatarsal  Workstation performed: [id number]     Ct Lower Extremity Wo Contrast Right    Result Date: 5/4/2021  Impression: 1  Intact Lisfranc ligament  2   Mildly displaced fractures noted at the 2nd through 4th metatarsal bases without intra-articular component and without additional fracture identified  Workstation performed: [id number]     Other Diagnostic Testing: I have personally reviewed pertinent reports      ACTIVE MEDICATIONS     Current Facility-Administered Medications   Medication Dose Route Frequency    acetaminophen (TYLENOL) tablet 975 mg  975 mg Oral Q8H [facility]    amLODIPine (NORVASC) tablet 5 mg  5 mg Oral Daily    busPIRone (BUSPAR) tablet 15 mg  15 mg Oral TID    calcium carbonate (TUMS) chewable tablet 500 mg  500 mg Oral Daily PRN    cholecalciferol (VITAMIN D3) tablet 1,000 Units  1,000 Units Oral Daily    Diclofenac Sodium (VOLTAREN) 1 % topical gel 2 g  2 g Topical 4x Daily    DULoxetine (CYMBALTA) delayed release capsule 60 mg  60 mg Oral Daily    heparin (porcine) subcutaneous injection 7,500 Units  7,500 Units Subcutaneous Q8H [facility]    hydrOXYzine HCL (ATARAX) tablet 25 mg  25 mg Oral Q6H PRN    ibuprofen (MOTRIN) tablet 400 mg  400 mg Oral Q6H PRN    morphine (MS CONTIN) ER tablet 15 mg  15 mg Oral Q12H [facility]    nortriptyline (PAMELOR) capsule 10 mg  10 mg Oral HS    ondansetron (ZOFRAN) injection 4 mg  4 mg Intravenous Q6H PRN    oxybutynin (DITROPAN) tablet 5 mg  5 mg Oral BID    oxyCODONE (ROXICODONE) IR tablet 5 mg  5 mg Oral Daily PRN    pantoprazole (PROTONIX) EC tablet 40 mg  40 mg Oral Early Morning    polyethylene glycol (MIRALAX) packet 17 g  17 g Oral Daily    prazosin (MINIPRESS) capsule 2 mg  2 mg Oral Daily    pregabalin (LYRICA) capsule 200 mg  200 mg Oral BID    And    pregabalin (LYRICA) capsule 25 mg  25 mg Oral BID    propranolol (INDERAL) tablet 20 mg  20 mg Oral BID    QUEtiapine (SEROquel) tablet 300 mg  300 mg Oral HS    senna-docusate sodium (SENOKOT S) 8 6-50 mg per tablet 1 tablet  1 tablet Oral BID    sodium chloride 0 9 % infusion  20 mL/hr Intravenous Continuous PRN    Valbenazine Tosylate CAPS 80 mg  80 mg Oral Daily       VTE Pharmacologic Prophylaxis: Heparin  VTE Mechanical Prophylaxis: sequential compression device    Portions of the record may have been created with voice recognition software  Occasional wrong word or "sound a like" substitutions may have occurred due to the inherent limitations of voice recognition software    Read the chart carefully and recognize, using context, where substitutions have occurred   ==  Luis Goldberg DO  [address]  Internal Medicine Residency PGY-3

## 2021-05-12 NOTE — PHYSICAL THERAPY NOTE
Physical Therapy Treatment Note       05/12/21 1045   PT Last Visit   PT Visit Date 05/12/21   Note Type   Note Type Treatment   Pain Assessment   Pain Assessment Tool 0-10   Pain Score 6   Pain Location/Orientation Location: Back  (R foot)   Patient's Stated Pain Goal No pain   Hospital Pain Intervention(s) Repositioned   Restrictions/Precautions   Weight Bearing Precautions Per Order Yes   RLE Weight Bearing Per Order NWB   Braces or Orthoses Splint   Other Precautions Cognitive; Chair Alarm; Bed Alarm;Multiple lines; Fall Risk   General   Chart Reviewed Yes   Family/Caregiver Present No   Cognition   Overall Cognitive Status Impaired   Arousal/Participation Responsive   Attention Attends with cues to redirect   Orientation Level Oriented X4   Memory Unable to assess   Following Commands Follows one step commands without difficulty   Subjective   Subjective states she feels OK  Limited by pain, need to urinate   Bed Mobility   Rolling R 5  Supervision   Additional items Assist x 1  (after session to get onto bedpan again)   Supine to Sit 5  Supervision   Additional items Assist x 1; Increased time required   Sit to Supine 5  Supervision   Additional items Assist x 1; Increased time required   Transfers   Sit to Stand 3  Moderate assistance   Additional items Assist x 1   Stand to Sit 3  Moderate assistance   Additional items Assist x 1   Additional Comments standing trials x 4- each x 25-30 sec w/ several minuted seated rests in between trials    3rd stand and 4th stand were to get on and off bedpan in sitting   Balance   Static Sitting Fair   Dynamic Sitting Fair -   Static Standing Poor +   Dynamic Standing Poor   Endurance Deficit   Endurance Deficit Yes   Endurance Deficit Description fatigue, weakness, pain   Activity Tolerance   Activity Tolerance Patient limited by fatigue;Patient limited by pain;Treatment limited secondary to medical complications (Comment)   Nurse Made Aware yes   Assessment   Prognosis Fair   Problem List Decreased strength;Decreased endurance; Impaired balance;Decreased mobility;Pain;Orthopedic restrictions   Assessment Pt seen for session for setup, bed mob, time spent EOB, standing trials, return to supine and time for getting on/off bedpan  Pt cooperative w/ session  Impulsive but cooperative  Note improved bed mob, sitting tolerance, but continues to be impulsive w/ standing  continue to recommend rehab at d/c   Goals   Patient Goals to rest   STG Expiration Date 05/21/21   PT Treatment Day 2   Plan   Treatment/Interventions Functional transfer training;LE strengthening/ROM; Therapeutic exercise; Endurance training;Equipment eval/education; Bed mobility;Gait training;Patient/family training   Progress Progressing toward goals   PT Frequency Other (Comment)  (3-5x/wk)   Recommendation   PT Discharge Recommendation Post acute rehabilitation services   Equipment Recommended Wheelchair;Walker   PT - OK to Discharge Yes  (when stable to rehab)   [place] 8 in Bed Without Bedrails 3   Lying on Back to Sitting on Edge of Flat Bed 3   Moving Bed to Chair 1   Standing Up From Chair 1   Walk in Room 1   Climb 3-5 Stairs 1   Basic Mobility Inpatient Raw Score 10   Turning Head Towards Sound 4   Follow Simple Instructions 3   Low Function Basic Mobility Raw Score 17   Low Function Basic Mobility Standardized Score 27 46   Romayne Crooked PT, DPT CSRS

## 2021-05-12 NOTE — UTILIZATION REVIEW
Continued Stay Review    Date:   5-12-21                           Current Patient Class: inpatient Current Level of Care: med surg    HPI:55 y o  female initially admitted on 5-2-21 for  Ambulatory dysfunction  Metatarsal fractures - non displaced on RLE  Left hip pain snehal to trochanteric bursitis  Opoid dependence  Assessment/Plan:   Continue PT/OT for treatment of functional deficits with recommendation for inpatient rehab  Behavioral health consult completed  5-1 for evaluation of anxiety and clearance for SNF placement  Referrals completed  County approval pending        Behavioral health consult   Plan:   Continue medical management  Continue current psychotropic medication  Patient is psychiatrically cleared to go to SNF  Discussed with primary team  No other intervention at this time    Vital Signs:    Vitals:    05/11/21 2328 05/12/21 0708 05/12/21 0827 05/12/21 0828   BP: 140/85 99/61 113/70 113/70   Pulse:  (!) 46 59 62   Resp: 18 18     Temp:  97 9 °F (36 6 °C)     TempSrc:       SpO2:  100%  96%     Pertinent Labs/Diagnostic Results:   Results from last 7 days   Lab Units 05/06/21  1704   SARS-COV-2  Negative     Results from last 7 days   Lab Units 05/08/21  0535   WBC Thousand/uL 4 27*   HEMOGLOBIN g/dL 12 6   HEMATOCRIT % 39 3   PLATELETS Thousands/uL 274         Results from last 7 days   Lab Units 05/09/21  0513 05/08/21  0535 05/07/21  0621 05/06/21  0627   SODIUM mmol/L 142 142 141 142   POTASSIUM mmol/L 3 9 3 4* 3 8 4 2   CHLORIDE mmol/L 109* 110* 108 110*   CO2 mmol/L 29 28 32 27   ANION GAP mmol/L 4 4 1* 5   BUN mg/dL 9 13 13 15   CREATININE mg/dL 0 70 0 74 0 75 0 69   EGFR ml/min/1 73sq m 111 104 102 112   CALCIUM mg/dL 8 2* 8 2* 8 8 8 7     Results from last 7 days   Lab Units 05/08/21  0535   AST U/L 18   ALT U/L 30   ALK PHOS U/L 86   TOTAL PROTEIN g/dL 6 0*   ALBUMIN g/dL 2 8*   TOTAL BILIRUBIN mg/dL 0 42         Results from last 7 days   Lab Units 05/09/21  0513 05/08/21  0535 05/07/21  0621 05/06/21  0627   GLUCOSE RANDOM mg/dL 82 84 82 79       Medications:     acetaminophen, 975 mg, Oral, Q8H KESHIA  amLODIPine, 5 mg, Oral, Daily  busPIRone, 15 mg, Oral, TID  cholecalciferol, 1,000 Units, Oral, Daily  Diclofenac Sodium, 2 g, Topical, 4x Daily  DULoxetine, 60 mg, Oral, Daily  heparin (porcine), 7,500 Units, Subcutaneous, Q8H KESHIA  morphine, 15 mg, Oral, Q12H KESHIA  nortriptyline, 10 mg, Oral, HS  oxybutynin, 5 mg, Oral, BID  pantoprazole, 40 mg, Oral, Early Morning  polyethylene glycol, 17 g, Oral, Daily  prazosin, 2 mg, Oral, Daily  pregabalin, 200 mg, Oral, BID    And  pregabalin, 25 mg, Oral, BID  propranolol, 20 mg, Oral, BID  QUEtiapine, 300 mg, Oral, HS  senna-docusate sodium, 1 tablet, Oral, BID  Valbenazine Tosylate, 80 mg, Oral, Daily      Continuous IV Infusions:  sodium chloride, 20 mL/hr, Intravenous, Continuous PRN      PRN Meds:  calcium carbonate, 500 mg, Oral, Daily PRN  hydrOXYzine HCL, 25 mg, Oral, Q6H PRN  ibuprofen, 400 mg, Oral, Q6H PRN  ondansetron, 4 mg, Intravenous, Q6H PRN  oxyCODONE, 5 mg, Oral, Daily PRN  sodium chloride, 20 mL/hr, Intravenous, Continuous PRN        Discharge Plan: to be determined     Network Utilization Review Department  ATTENTION: Please call with any questions or concerns to [phone] and carefully listen to the prompts so that you are directed to the right person  All voicemails are confidential   Nadine Ling all requests for admission clinical reviews, approved or denied determinations and any other requests to dedicated fax number below belonging to the campus where the patient is receiving treatment   List of dedicated fax numbers for the Facilities:  [address] DENIALS (Administrative/Medical Necessity) [phone]   [address] (Maternity/NICU/Pediatrics) [address] [place] [address],[address] [place] [address] [phone]   Shira Bales [address] [place] [address] [address] [place] [address] [address] [place] [address] [address]za [address] [place] [address] [phone]

## 2021-05-12 NOTE — TELEPHONE ENCOUNTER
[employer]- [id number] [facility]    Name of Form- [id number]    Form to be filled out by-Dr Baron Melara    Form to be Faxed [phone]    Patient made aware of 10 business day policy  Please let Yamile Moreno know when form has been completed and faxed

## 2021-05-13 ENCOUNTER — TELEPHONE (OUTPATIENT)
Dept: BARIATRICS | Facility: CLINIC | Age: 56
End: 2021-05-13

## 2021-05-13 PROCEDURE — 99232 SBSQ HOSP IP/OBS MODERATE 35: CPT | Performed by: INTERNAL MEDICINE

## 2021-05-13 RX ADMIN — DOCUSATE SODIUM AND SENNOSIDES 1 TABLET: 8.6; 5 TABLET ORAL at 16:35

## 2021-05-13 RX ADMIN — PREGABALIN 200 MG: 100 CAPSULE ORAL at 09:49

## 2021-05-13 RX ADMIN — NORTRIPTYLINE HYDROCHLORIDE 10 MG: 10 CAPSULE ORAL at 22:11

## 2021-05-13 RX ADMIN — PROPRANOLOL HYDROCHLORIDE 20 MG: 20 TABLET ORAL at 09:51

## 2021-05-13 RX ADMIN — DOCUSATE SODIUM AND SENNOSIDES 1 TABLET: 8.6; 5 TABLET ORAL at 09:49

## 2021-05-13 RX ADMIN — MORPHINE SULFATE 15 MG: 15 TABLET, FILM COATED, EXTENDED RELEASE ORAL at 09:50

## 2021-05-13 RX ADMIN — HEPARIN SODIUM 7500 UNITS: 5000 INJECTION INTRAVENOUS; SUBCUTANEOUS at 22:08

## 2021-05-13 RX ADMIN — DICLOFENAC SODIUM 2 G: 10 GEL TOPICAL at 09:54

## 2021-05-13 RX ADMIN — BUSPIRONE HYDROCHLORIDE 15 MG: 10 TABLET ORAL at 09:48

## 2021-05-13 RX ADMIN — HEPARIN SODIUM 7500 UNITS: 5000 INJECTION INTRAVENOUS; SUBCUTANEOUS at 16:36

## 2021-05-13 RX ADMIN — ACETAMINOPHEN 975 MG: 325 TABLET ORAL at 06:22

## 2021-05-13 RX ADMIN — ACETAMINOPHEN 975 MG: 325 TABLET ORAL at 16:34

## 2021-05-13 RX ADMIN — Medication 1000 UNITS: at 09:49

## 2021-05-13 RX ADMIN — POLYETHYLENE GLYCOL 3350 17 G: 17 POWDER, FOR SOLUTION ORAL at 09:51

## 2021-05-13 RX ADMIN — PROPRANOLOL HYDROCHLORIDE 20 MG: 20 TABLET ORAL at 16:37

## 2021-05-13 RX ADMIN — QUETIAPINE FUMARATE 300 MG: 100 TABLET ORAL at 22:09

## 2021-05-13 RX ADMIN — DULOXETINE HYDROCHLORIDE 60 MG: 60 CAPSULE, DELAYED RELEASE ORAL at 09:48

## 2021-05-13 RX ADMIN — HEPARIN SODIUM 7500 UNITS: 5000 INJECTION INTRAVENOUS; SUBCUTANEOUS at 06:22

## 2021-05-13 RX ADMIN — BUSPIRONE HYDROCHLORIDE 15 MG: 10 TABLET ORAL at 16:36

## 2021-05-13 RX ADMIN — PREGABALIN 200 MG: 100 CAPSULE ORAL at 22:08

## 2021-05-13 RX ADMIN — PRAZOSIN HYDROCHLORIDE 2 MG: 2 CAPSULE ORAL at 09:50

## 2021-05-13 RX ADMIN — PREGABALIN 25 MG: 25 CAPSULE ORAL at 22:10

## 2021-05-13 RX ADMIN — OXYBUTYNIN CHLORIDE 5 MG: 5 TABLET ORAL at 09:49

## 2021-05-13 RX ADMIN — OXYBUTYNIN CHLORIDE 5 MG: 5 TABLET ORAL at 16:35

## 2021-05-13 RX ADMIN — AMLODIPINE BESYLATE 5 MG: 5 TABLET ORAL at 09:48

## 2021-05-13 RX ADMIN — PREGABALIN 25 MG: 25 CAPSULE ORAL at 09:49

## 2021-05-13 RX ADMIN — ACETAMINOPHEN 975 MG: 325 TABLET ORAL at 22:09

## 2021-05-13 RX ADMIN — PANTOPRAZOLE SODIUM 40 MG: 40 TABLET, DELAYED RELEASE ORAL at 06:22

## 2021-05-13 RX ADMIN — BUSPIRONE HYDROCHLORIDE 15 MG: 10 TABLET ORAL at 22:10

## 2021-05-13 RX ADMIN — MORPHINE SULFATE 15 MG: 15 TABLET, FILM COATED, EXTENDED RELEASE ORAL at 22:09

## 2021-05-13 NOTE — PROGRESS NOTES
INTERNAL MEDICINE RESIDENCY PROGRESS NOTE     Name: Connie Darnell   Age & Sex: 60 y o  female   MRN: [id number]  Unit/Bed#: CW2 218-01   Encounter: [id number]  Team: SOD Team B     PATIENT INFORMATION     Name: Connie Darnell   Age & Sex: 60 y o  female   MRN: [id number]  Hospital Stay Days: 11    ASSESSMENT/PLAN     Principal Problem:    Ambulatory dysfunction  Active Problems:    Fibromyalgia    Bipolar II disorder (HCC)    Generalized anxiety disorder    Hypertension    Insomnia    Migraine    Opioid dependence ([address] )    Left hip pain    Post traumatic stress disorder    Metatarsal fracture    Chronic back pain    Nausea      Nausea  Assessment & Plan  Patient reports nausea, comes and goes  Initially no change with Zofran x 1 or tums  No BM in 2 days  She has history of abdominal approach to back surgery in 2018, otherwise no abdominal surgeries  - continue Zofran PRN, TUMS PRN  - continue to monitor    Metatarsal fracture  Assessment & Plan  Mechanical fall 2 days ago, imaging at [facility] showed 2nd, 3rd, 4th non displaced fracture of metatarsals  Patient has been bearing weight on the right foot in ambulating around her house, this morning she reports the pain was too severe and she came into the ED, patient is requesting  Placement into a nursing facility     Xray obtained in ER reviewed: Unchanged nondisplaced fractures at the bases of the 2nd and 4th metatarsals  Small intra-articular fracture at the base of the 3rd metatarsal   No significant widening between the 1st and 2nd metatarsals to suggest Lisfranc injury  - podiatry consulted - no surgical intervention indicated   - will reassess cast today   - NWB to RLE  - pain management with home Lyrica, morphine, Toradol 10mg PO for moderate pain (5 days, now  with no doses given), oxycodone 5mg daily PRN for severe pain  - rehab placement pending    Left hip pain  Assessment & Plan  History of bilateral hip OA   Patient reports worsening of chronic left hip pain since her fall  Notes pain is worse with laying on left side  Associated numbness and tingling on anterolateral thigh  No groin pain  ROM intact, no pain with internal or external rotation, though difficulty weight bearing per RN  - suspect trochanteric bursitis and possibly concurrent meralgia paresthetica based on exam and description  - doubt acute joint pathology at this time  - Toradol order in place x 5 days, patient had not received any, can reorder if pain recurs vs consider other NSAID  - continue home pregabalin    Opioid dependence ([facility] [address] )  Assessment & Plan  PDMP reviewed  Receives pregabalin, MS Contin 15mg BID, and oxycodone 5mg daily PRN  - continue home regimen      Generalized anxiety disorder  Assessment & Plan  -continue home meds    Bipolar II disorder [facility])  Assessment & Plan  Psych consult requested by [facility] for placement; consult placed 5/11  - psychiatry team has cleared patient for discharge to rehab    * Ambulatory dysfunction  Assessment & Plan  Present at baseline though worsened in the setting of metatarsal fracture  - rehab placement pending    Tachycardia-resolved as of 5/4/2021  Assessment & Plan  Tachycardic on vitals checks overnight  Not on tele  No history of arrhythmia  Patient denies symptoms  She was noted by nursing to be very anxious for much of the night, possibly related to her son visiting  In the morning, HR normal while sleeping   - likely related to anxiety  - monitor with routine vitals  - can add tele if recurrent    CALE (acute kidney injury) (HCC)-resolved as of 5/4/2021  Assessment & Plan  Creatinine has acutely risen to 1 5 on morning labs, from 1 0 the previous evening   BUN 6->12 so not clearly prerenal, but given low BP will try IV hydration   - hold morphine, replace with oxycodone  - hold NSAIDs  - IV hydration:  cc bolus   - resolved  - encourage PO fluids  - check UA  - monitor I/Os  - follow BMP in AM      Disposition: Placement pending     SUBJECTIVE     Patient seen and examined  No acute events overnight  She reports pain is well controlled  She is concerned that her cast is starting to come apart  She is asking about bariatric surgery follow up, as they had previously told her she would need to be living at home before proceeding with surgery  OBJECTIVE     Vitals:    21 0828 21 1531 21 0023 21 0719   BP: 113/70 126/79 114/61 124/84   Pulse: 62 64 59 67   Resp:    18   Temp:  98 2 °F (36 8 °C) 98 7 °F (37 1 °C) 97 9 °F (36 6 °C)   TempSrc:       SpO2: 96% 99% 95% 96%      Temperature:   Temp (24hrs), Av 3 °F (36 8 °C), Min:97 9 °F (36 6 °C), Max:98 7 °F (37 1 °C)    Temperature: 97 9 °F (36 6 °C)  Intake & Output:  I/O       701 -  0700 701 -  0700  07 -  0700    P  O  720 120     Total Intake 720 120     Urine 2178 2575     Stool       Total Output 2171 2795     Net -[phone] -[phone]                Weights: There is no height or weight on file to calculate BMI  Weight (last 2 days)     None        Physical Exam  Constitutional:       Appearance: She is well-developed  HENT:      Head: Normocephalic and atraumatic  Eyes:      General: No scleral icterus  Conjunctiva/sclera: Conjunctivae normal    Cardiovascular:      Rate and Rhythm: Normal rate and regular rhythm  Heart sounds: Normal heart sounds  No murmur  Pulmonary:      Effort: Pulmonary effort is normal       Breath sounds: Normal breath sounds  No wheezing or rales  Abdominal:      General: Bowel sounds are normal  There is no distension  Palpations: Abdomen is soft  Tenderness: There is no abdominal tenderness  There is no guarding  Musculoskeletal:         General: No tenderness or deformity  Skin:     General: Skin is warm and dry  Findings: No erythema  Neurological:      General: No focal deficit present        Mental Status: She is alert and oriented to person, place, and time  Psychiatric:         Mood and Affect: Mood normal          Behavior: Behavior normal        LABORATORY DATA     Labs: I have personally reviewed pertinent reports  Results from last 7 days   Lab Units 05/08/21  0535   WBC Thousand/uL 4 27*   HEMOGLOBIN g/dL 12 6   HEMATOCRIT % 39 3   PLATELETS Thousands/uL 274      Results from last 7 days   Lab Units 05/09/21  0513 05/08/21  0535 05/07/21  0621   POTASSIUM mmol/L 3 9 3 4* 3 8   CHLORIDE mmol/L 109* 110* 108   CO2 mmol/L 29 28 32   BUN mg/dL 9 13 13   CREATININE mg/dL 0 70 0 74 0 75   CALCIUM mg/dL 8 2* 8 2* 8 8   ALK PHOS U/L  --  86  --    ALT U/L  --  30  --    AST U/L  --  18  --                             IMAGING & DIAGNOSTIC TESTING     Radiology Results: I have personally reviewed pertinent reports  Xr Foot 3+ Vw Left    Result Date: 5/4/2021  Impression: Severe hallux valgus deformity  Lisfranc alignment appears grossly maintained  Workstation performed: [id number]     Xr Foot 3+ Views Right    Result Date: 5/2/2021  Impression: Unchanged fractures of the 2nd and 4th metatarsals  Small intra-articular fracture base of 3rd metatarsal  Workstation performed: [id number]     Ct Lower Extremity Wo Contrast Right    Result Date: 5/4/2021  Impression: 1  Intact Lisfranc ligament  2   Mildly displaced fractures noted at the 2nd through 4th metatarsal bases without intra-articular component and without additional fracture identified  Workstation performed: [id number]     Other Diagnostic Testing: I have personally reviewed pertinent reports      ACTIVE MEDICATIONS     Current Facility-Administered Medications   Medication Dose Route Frequency    acetaminophen (TYLENOL) tablet 975 mg  975 mg Oral Q8H [facility]    amLODIPine (NORVASC) tablet 5 mg  5 mg Oral Daily    busPIRone (BUSPAR) tablet 15 mg  15 mg Oral TID    calcium carbonate (TUMS) chewable tablet 500 mg  500 mg Oral Daily PRN    cholecalciferol (VITAMIN D3) tablet 1,000 Units  1,000 Units Oral Daily    Diclofenac Sodium (VOLTAREN) 1 % topical gel 2 g  2 g Topical 4x Daily    DULoxetine (CYMBALTA) delayed release capsule 60 mg  60 mg Oral Daily    heparin (porcine) subcutaneous injection 7,500 Units  7,500 Units Subcutaneous Q8H [address]    hydrOXYzine HCL (ATARAX) tablet 25 mg  25 mg Oral Q6H PRN    ibuprofen (MOTRIN) tablet 400 mg  400 mg Oral Q6H PRN    morphine (MS CONTIN) ER tablet 15 mg  15 mg Oral Q12H [address]    nortriptyline (PAMELOR) capsule 10 mg  10 mg Oral HS    ondansetron (ZOFRAN) injection 4 mg  4 mg Intravenous Q6H PRN    oxybutynin (DITROPAN) tablet 5 mg  5 mg Oral BID    oxyCODONE (ROXICODONE) IR tablet 5 mg  5 mg Oral Daily PRN    pantoprazole (PROTONIX) EC tablet 40 mg  40 mg Oral Early Morning    polyethylene glycol (MIRALAX) packet 17 g  17 g Oral Daily    prazosin (MINIPRESS) capsule 2 mg  2 mg Oral Daily    pregabalin (LYRICA) capsule 200 mg  200 mg Oral BID    And    pregabalin (LYRICA) capsule 25 mg  25 mg Oral BID    propranolol (INDERAL) tablet 20 mg  20 mg Oral BID    QUEtiapine (SEROquel) tablet 300 mg  300 mg Oral HS    senna-docusate sodium (SENOKOT S) 8 6-50 mg per tablet 1 tablet  1 tablet Oral BID    sodium chloride 0 9 % infusion  20 mL/hr Intravenous Continuous PRN    Valbenazine Tosylate CAPS 80 mg  80 mg Oral Daily       VTE Pharmacologic Prophylaxis: Heparin  VTE Mechanical Prophylaxis: sequential compression device    Portions of the record may have been created with voice recognition software  Occasional wrong word or "sound a like" substitutions may have occurred due to the inherent limitations of voice recognition software    Read the chart carefully and recognize, using context, where substitutions have occurred   ==  Donnie Estevez DO  [facility]  Internal Medicine Residency PGY-3

## 2021-05-13 NOTE — TELEPHONE ENCOUNTER
Chart reviewed  Phone call to patient  She is presently in [address] with a broken foot with d/c plan for her to go to SNF for rehab  Explained her appointment for evaluation on Monday will need to be cancelled  Once she completes her rehab, and returns home, she may call the office to reschedule  Patient expressed disappointment because it will take so long to get an appointment  Once patient has a d/c date from SNF, she may call the office to reschedule since we are scheduling out a few weeks  Patient verbalized understanding

## 2021-05-13 NOTE — CASE MANAGEMENT
HUNTER received a TC from Matthew with [facility] reporting that pt's information was received and information will be sent to [facility] in [place] for further review with their Program Office of Mental Health and Substance Abuse  Matthew also reported that pt is nursing facility eligible based on the [facility]'s determination, however clearance will be needed from the [place] [id number] program office for a complete review/final determination  HUNTER will continue to follow

## 2021-05-14 PROCEDURE — 99232 SBSQ HOSP IP/OBS MODERATE 35: CPT | Performed by: INTERNAL MEDICINE

## 2021-05-14 RX ADMIN — DICLOFENAC SODIUM 2 G: 10 GEL TOPICAL at 18:02

## 2021-05-14 RX ADMIN — PRAZOSIN HYDROCHLORIDE 2 MG: 2 CAPSULE ORAL at 08:30

## 2021-05-14 RX ADMIN — HEPARIN SODIUM 7500 UNITS: 5000 INJECTION INTRAVENOUS; SUBCUTANEOUS at 05:48

## 2021-05-14 RX ADMIN — PROPRANOLOL HYDROCHLORIDE 20 MG: 20 TABLET ORAL at 08:30

## 2021-05-14 RX ADMIN — BUSPIRONE HYDROCHLORIDE 15 MG: 10 TABLET ORAL at 22:46

## 2021-05-14 RX ADMIN — DICLOFENAC SODIUM 2 G: 10 GEL TOPICAL at 11:33

## 2021-05-14 RX ADMIN — PREGABALIN 25 MG: 25 CAPSULE ORAL at 22:43

## 2021-05-14 RX ADMIN — PREGABALIN 25 MG: 25 CAPSULE ORAL at 08:29

## 2021-05-14 RX ADMIN — OXYBUTYNIN CHLORIDE 5 MG: 5 TABLET ORAL at 18:01

## 2021-05-14 RX ADMIN — NORTRIPTYLINE HYDROCHLORIDE 10 MG: 10 CAPSULE ORAL at 22:46

## 2021-05-14 RX ADMIN — DOCUSATE SODIUM AND SENNOSIDES 1 TABLET: 8.6; 5 TABLET ORAL at 18:01

## 2021-05-14 RX ADMIN — PREGABALIN 200 MG: 100 CAPSULE ORAL at 08:27

## 2021-05-14 RX ADMIN — POLYETHYLENE GLYCOL 3350 17 G: 17 POWDER, FOR SOLUTION ORAL at 08:27

## 2021-05-14 RX ADMIN — HEPARIN SODIUM 7500 UNITS: 5000 INJECTION INTRAVENOUS; SUBCUTANEOUS at 22:40

## 2021-05-14 RX ADMIN — AMLODIPINE BESYLATE 5 MG: 5 TABLET ORAL at 08:29

## 2021-05-14 RX ADMIN — ACETAMINOPHEN 975 MG: 325 TABLET ORAL at 22:46

## 2021-05-14 RX ADMIN — HYDROXYZINE HYDROCHLORIDE 25 MG: 25 TABLET, FILM COATED ORAL at 23:59

## 2021-05-14 RX ADMIN — OXYBUTYNIN CHLORIDE 5 MG: 5 TABLET ORAL at 08:29

## 2021-05-14 RX ADMIN — BUSPIRONE HYDROCHLORIDE 15 MG: 10 TABLET ORAL at 08:27

## 2021-05-14 RX ADMIN — DOCUSATE SODIUM AND SENNOSIDES 1 TABLET: 8.6; 5 TABLET ORAL at 08:28

## 2021-05-14 RX ADMIN — Medication 1000 UNITS: at 08:29

## 2021-05-14 RX ADMIN — PREGABALIN 200 MG: 100 CAPSULE ORAL at 22:46

## 2021-05-14 RX ADMIN — MORPHINE SULFATE 15 MG: 15 TABLET, FILM COATED, EXTENDED RELEASE ORAL at 08:28

## 2021-05-14 RX ADMIN — BUSPIRONE HYDROCHLORIDE 15 MG: 10 TABLET ORAL at 18:01

## 2021-05-14 RX ADMIN — QUETIAPINE FUMARATE 300 MG: 100 TABLET ORAL at 22:40

## 2021-05-14 RX ADMIN — MORPHINE SULFATE 15 MG: 15 TABLET, FILM COATED, EXTENDED RELEASE ORAL at 22:46

## 2021-05-14 RX ADMIN — HEPARIN SODIUM 7500 UNITS: 5000 INJECTION INTRAVENOUS; SUBCUTANEOUS at 13:12

## 2021-05-14 RX ADMIN — DULOXETINE HYDROCHLORIDE 60 MG: 60 CAPSULE, DELAYED RELEASE ORAL at 08:27

## 2021-05-14 RX ADMIN — ACETAMINOPHEN 975 MG: 325 TABLET ORAL at 05:48

## 2021-05-14 RX ADMIN — ACETAMINOPHEN 975 MG: 325 TABLET ORAL at 13:12

## 2021-05-14 RX ADMIN — PANTOPRAZOLE SODIUM 40 MG: 40 TABLET, DELAYED RELEASE ORAL at 05:48

## 2021-05-14 RX ADMIN — DICLOFENAC SODIUM 2 G: 10 GEL TOPICAL at 08:29

## 2021-05-14 NOTE — CASE MANAGEMENT
CM received a fax from Julisa Paris with [address] indicating that the pt is approved for a skilled nursing facility  CM was asked to re-fax pt's MA-51 with medications listed on the form  CM faxed revised MA-51 with medication list for security  CM will continue to follow

## 2021-05-14 NOTE — PROGRESS NOTES
INTERNAL MEDICINE RESIDENCY PROGRESS NOTE     Name: Tanja James   Age & Sex: 60 y o  female   MRN: [id number]  Unit/Bed#: CW2 218-01   Encounter: [id number]  Team: SOD Team B     PATIENT INFORMATION     Name: Tanja James   Age & Sex: 60 y o  female   MRN: [id number]  Hospital Stay Days: 12    ASSESSMENT/PLAN     Principal Problem:    Ambulatory dysfunction  Active Problems:    Fibromyalgia    Bipolar II disorder (HCC)    Generalized anxiety disorder    Hypertension    Insomnia    Migraine    Opioid dependence ([facility] [address] )    Left hip pain    Post traumatic stress disorder    Metatarsal fracture    Chronic back pain    Nausea      Nausea  Assessment & Plan  Patient reports nausea, comes and goes  Initially no change with Zofran x 1 or tums  No BM in 2 days  She has history of abdominal approach to back surgery in 2018, otherwise no abdominal surgeries  - continue Zofran PRN, TUMS PRN  - continue to monitor    Metatarsal fracture  Assessment & Plan  Mechanical fall 2 days ago, imaging at [facility] showed 2nd, 3rd, 4th non displaced fracture of metatarsals  Patient has been bearing weight on the right foot in ambulating around her house, this morning she reports the pain was too severe and she came into the ED, patient is requesting  Placement into a nursing facility     Xray obtained in ER reviewed: Unchanged nondisplaced fractures at the bases of the 2nd and 4th metatarsals  Small intra-articular fracture at the base of the 3rd metatarsal   No significant widening between the 1st and 2nd metatarsals to suggest Lisfranc injury  - podiatry consulted - no surgical intervention indicated  - NWB to RLE  - pain management with home Lyrica, morphine, Toradol 10mg PO for moderate pain (5 days, now  with no doses given), oxycodone 5mg daily PRN for severe pain  - rehab placement pending    Left hip pain  Assessment & Plan  History of bilateral hip OA   Patient reports worsening of chronic left hip pain since her fall  Notes pain is worse with laying on left side  Associated numbness and tingling on anterolateral thigh  No groin pain  ROM intact, no pain with internal or external rotation, though difficulty weight bearing per RN  - suspect trochanteric bursitis and possibly concurrent meralgia paresthetica based on exam and description  - doubt acute joint pathology at this time  - Toradol order in place x 5 days, patient had not received any, can reorder if pain recurs vs consider other NSAID  - continue home pregabalin    Opioid dependence ([facility] [address] )  Assessment & Plan  PDMP reviewed  Receives pregabalin, MS Contin 15mg BID, and oxycodone 5mg daily PRN  - continue home regimen      Generalized anxiety disorder  Assessment & Plan  -continue home meds    Bipolar II disorder [facility])  Assessment & Plan  [facility]; consult placed 5/11  - psychiatry team has cleared patient for discharge to rehab    * Ambulatory dysfunction  Assessment & Plan  Present at baseline though worsened in the setting of metatarsal fracture  - rehab placement pending    Tachycardia-resolved as of 5/4/2021  Assessment & Plan  Tachycardic on vitals checks overnight  Not on tele  No history of arrhythmia  Patient denies symptoms  She was noted by nursing to be very anxious for much of the night, possibly related to her son visiting  In the morning, HR normal while sleeping   - likely related to anxiety  - monitor with routine vitals  - can add tele if recurrent    CALE (acute kidney injury) (HCC)-resolved as of 5/4/2021  Assessment & Plan  Creatinine has acutely risen to 1 5 on morning labs, from 1 0 the previous evening   BUN 6->12 so not clearly prerenal, but given low BP will try IV hydration   - hold morphine, replace with oxycodone  - hold NSAIDs  - IV hydration:  cc bolus   - resolved  - encourage PO fluids  - check UA  - monitor I/Os  - follow BMP in AM        Disposition: Pending placement SUBJECTIVE     Patient seen and examined  No acute events overnight  She reports pain remains mild  She has been sleeping relatively well  Denies any complaints  OBJECTIVE     Vitals:    21 1537 21 2340 21 0821 21 0821   BP: 135/81 109/63 108/64 108/64   BP Location:  Left arm  Left arm   Pulse: 60 60 66 61   Resp:  18 18 18   Temp: 99 8 °F (37 7 °C) 98 5 °F (36 9 °C) 98 °F (36 7 °C) 98 °F (36 7 °C)   TempSrc:  Oral  Oral   SpO2: 93% 98% 98% 99%      Temperature:   Temp (24hrs), Av 6 °F (37 °C), Min:98 °F (36 7 °C), Max:99 8 °F (37 7 °C)    Temperature: 98 °F (36 7 °C)  Intake & Output:  I/O        07 -  0700  07 -  0700  07 - 05/15 0700    P  O  120 2040 240    Total Intake 120 2040 240    Urine 2575 2750     Stool  0     Total Output 2575 2750     Net -2455 -710 +240           Unmeasured Stool Occurrence  2 x         Weights: There is no height or weight on file to calculate BMI  Weight (last 2 days)     None        Physical Exam  Constitutional:       Appearance: She is well-developed  HENT:      Head: Normocephalic and atraumatic  Eyes:      General: No scleral icterus  Conjunctiva/sclera: Conjunctivae normal    Cardiovascular:      Rate and Rhythm: Normal rate and regular rhythm  Heart sounds: Normal heart sounds  No murmur  Pulmonary:      Effort: Pulmonary effort is normal       Breath sounds: Normal breath sounds  No wheezing or rales  Abdominal:      General: Bowel sounds are normal  There is no distension  Palpations: Abdomen is soft  Tenderness: There is no abdominal tenderness  There is no guarding  Musculoskeletal:         General: No tenderness or deformity  Skin:     General: Skin is warm and dry  Findings: No erythema  Neurological:      General: No focal deficit present  Mental Status: She is alert and oriented to person, place, and time     Psychiatric:         Mood and Affect: Mood normal          Behavior: Behavior normal        LABORATORY DATA     Labs: I have personally reviewed pertinent reports  Results from last 7 days   Lab Units 05/08/21  0535   WBC Thousand/uL 4 27*   HEMOGLOBIN g/dL 12 6   HEMATOCRIT % 39 3   PLATELETS Thousands/uL 274      Results from last 7 days   Lab Units 05/09/21  0513 05/08/21  0535   POTASSIUM mmol/L 3 9 3 4*   CHLORIDE mmol/L 109* 110*   CO2 mmol/L 29 28   BUN mg/dL 9 13   CREATININE mg/dL 0 70 0 74   CALCIUM mg/dL 8 2* 8 2*   ALK PHOS U/L  --  86   ALT U/L  --  30   AST U/L  --  18                            IMAGING & DIAGNOSTIC TESTING     Radiology Results: I have personally reviewed pertinent reports  Xr Foot 3+ Vw Left    Result Date: 5/4/2021  Impression: Severe hallux valgus deformity  Lisfranc alignment appears grossly maintained  Workstation performed: [id number]     Xr Foot 3+ Views Right    Result Date: 5/2/2021  Impression: Unchanged fractures of the 2nd and 4th metatarsals  Small intra-articular fracture base of 3rd metatarsal  Workstation performed: [id number]     Ct Lower Extremity Wo Contrast Right    Result Date: 5/4/2021  Impression: 1  Intact Lisfranc ligament  2   Mildly displaced fractures noted at the 2nd through 4th metatarsal bases without intra-articular component and without additional fracture identified  Workstation performed: [id number]     Other Diagnostic Testing: I have personally reviewed pertinent reports      ACTIVE MEDICATIONS     Current Facility-Administered Medications   Medication Dose Route Frequency    acetaminophen (TYLENOL) tablet 975 mg  975 mg Oral Q8H [facility]    amLODIPine (NORVASC) tablet 5 mg  5 mg Oral Daily    busPIRone (BUSPAR) tablet 15 mg  15 mg Oral TID    calcium carbonate (TUMS) chewable tablet 500 mg  500 mg Oral Daily PRN    cholecalciferol (VITAMIN D3) tablet 1,000 Units  1,000 Units Oral Daily    Diclofenac Sodium (VOLTAREN) 1 % topical gel 2 g  2 g Topical 4x Daily    DULoxetine (CYMBALTA) delayed release capsule 60 mg  60 mg Oral Daily    heparin (porcine) subcutaneous injection 7,500 Units  7,500 Units Subcutaneous Q8H [address]    hydrOXYzine HCL (ATARAX) tablet 25 mg  25 mg Oral Q6H PRN    ibuprofen (MOTRIN) tablet 400 mg  400 mg Oral Q6H PRN    morphine (MS CONTIN) ER tablet 15 mg  15 mg Oral Q12H [address]    nortriptyline (PAMELOR) capsule 10 mg  10 mg Oral HS    ondansetron (ZOFRAN) injection 4 mg  4 mg Intravenous Q6H PRN    oxybutynin (DITROPAN) tablet 5 mg  5 mg Oral BID    oxyCODONE (ROXICODONE) IR tablet 5 mg  5 mg Oral Daily PRN    pantoprazole (PROTONIX) EC tablet 40 mg  40 mg Oral Early Morning    polyethylene glycol (MIRALAX) packet 17 g  17 g Oral Daily    prazosin (MINIPRESS) capsule 2 mg  2 mg Oral Daily    pregabalin (LYRICA) capsule 200 mg  200 mg Oral BID    And    pregabalin (LYRICA) capsule 25 mg  25 mg Oral BID    propranolol (INDERAL) tablet 20 mg  20 mg Oral BID    QUEtiapine (SEROquel) tablet 300 mg  300 mg Oral HS    senna-docusate sodium (SENOKOT S) 8 6-50 mg per tablet 1 tablet  1 tablet Oral BID    sodium chloride 0 9 % infusion  20 mL/hr Intravenous Continuous PRN    Valbenazine Tosylate CAPS 80 mg  80 mg Oral Daily       VTE Pharmacologic Prophylaxis: Heparin  VTE Mechanical Prophylaxis: sequential compression device    Portions of the record may have been created with voice recognition software  Occasional wrong word or "sound a like" substitutions may have occurred due to the inherent limitations of voice recognition software    Read the chart carefully and recognize, using context, where substitutions have occurred   ==  Marina Hoskins, DO  [facility]  Internal Medicine Residency PGY-3

## 2021-05-14 NOTE — CASE MANAGEMENT
CM received a determination letter from both [facility] and [facility] in [place] stating pt is eligible to admit to a nursing facility  Pt accepted to [facility]  Facility informed in [facility] that pt is now able to admit  Will await new response on bed availability  CM dept will continue to follow

## 2021-05-15 PROCEDURE — 99232 SBSQ HOSP IP/OBS MODERATE 35: CPT | Performed by: INTERNAL MEDICINE

## 2021-05-15 RX ADMIN — PROPRANOLOL HYDROCHLORIDE 20 MG: 20 TABLET ORAL at 09:26

## 2021-05-15 RX ADMIN — PRAZOSIN HYDROCHLORIDE 2 MG: 2 CAPSULE ORAL at 09:26

## 2021-05-15 RX ADMIN — DOCUSATE SODIUM AND SENNOSIDES 1 TABLET: 8.6; 5 TABLET ORAL at 09:24

## 2021-05-15 RX ADMIN — MORPHINE SULFATE 15 MG: 15 TABLET, FILM COATED, EXTENDED RELEASE ORAL at 09:24

## 2021-05-15 RX ADMIN — Medication 1000 UNITS: at 09:24

## 2021-05-15 RX ADMIN — DICLOFENAC SODIUM 2 G: 10 GEL TOPICAL at 17:05

## 2021-05-15 RX ADMIN — HEPARIN SODIUM 7500 UNITS: 5000 INJECTION INTRAVENOUS; SUBCUTANEOUS at 06:11

## 2021-05-15 RX ADMIN — PANTOPRAZOLE SODIUM 40 MG: 40 TABLET, DELAYED RELEASE ORAL at 06:11

## 2021-05-15 RX ADMIN — ACETAMINOPHEN 975 MG: 325 TABLET ORAL at 15:32

## 2021-05-15 RX ADMIN — OXYBUTYNIN CHLORIDE 5 MG: 5 TABLET ORAL at 17:04

## 2021-05-15 RX ADMIN — PREGABALIN 200 MG: 100 CAPSULE ORAL at 21:50

## 2021-05-15 RX ADMIN — NORTRIPTYLINE HYDROCHLORIDE 10 MG: 10 CAPSULE ORAL at 21:50

## 2021-05-15 RX ADMIN — DOCUSATE SODIUM AND SENNOSIDES 1 TABLET: 8.6; 5 TABLET ORAL at 17:04

## 2021-05-15 RX ADMIN — BUSPIRONE HYDROCHLORIDE 15 MG: 10 TABLET ORAL at 21:51

## 2021-05-15 RX ADMIN — ACETAMINOPHEN 975 MG: 325 TABLET ORAL at 21:51

## 2021-05-15 RX ADMIN — PREGABALIN 25 MG: 25 CAPSULE ORAL at 21:51

## 2021-05-15 RX ADMIN — OXYBUTYNIN CHLORIDE 5 MG: 5 TABLET ORAL at 09:25

## 2021-05-15 RX ADMIN — PROPRANOLOL HYDROCHLORIDE 20 MG: 20 TABLET ORAL at 17:04

## 2021-05-15 RX ADMIN — AMLODIPINE BESYLATE 5 MG: 5 TABLET ORAL at 09:25

## 2021-05-15 RX ADMIN — DULOXETINE HYDROCHLORIDE 60 MG: 60 CAPSULE, DELAYED RELEASE ORAL at 09:25

## 2021-05-15 RX ADMIN — POLYETHYLENE GLYCOL 3350 17 G: 17 POWDER, FOR SOLUTION ORAL at 09:27

## 2021-05-15 RX ADMIN — ONDANSETRON 4 MG: 2 INJECTION INTRAMUSCULAR; INTRAVENOUS at 23:30

## 2021-05-15 RX ADMIN — ACETAMINOPHEN 975 MG: 325 TABLET ORAL at 06:11

## 2021-05-15 RX ADMIN — BUSPIRONE HYDROCHLORIDE 15 MG: 10 TABLET ORAL at 15:32

## 2021-05-15 RX ADMIN — BUSPIRONE HYDROCHLORIDE 15 MG: 10 TABLET ORAL at 09:24

## 2021-05-15 RX ADMIN — DICLOFENAC SODIUM 2 G: 10 GEL TOPICAL at 09:25

## 2021-05-15 RX ADMIN — HYDROXYZINE HYDROCHLORIDE 25 MG: 25 TABLET, FILM COATED ORAL at 22:53

## 2021-05-15 RX ADMIN — HEPARIN SODIUM 7500 UNITS: 5000 INJECTION INTRAVENOUS; SUBCUTANEOUS at 21:48

## 2021-05-15 RX ADMIN — QUETIAPINE FUMARATE 300 MG: 100 TABLET ORAL at 21:51

## 2021-05-15 RX ADMIN — PREGABALIN 200 MG: 100 CAPSULE ORAL at 09:24

## 2021-05-15 RX ADMIN — PREGABALIN 25 MG: 25 CAPSULE ORAL at 09:25

## 2021-05-15 RX ADMIN — MORPHINE SULFATE 15 MG: 15 TABLET, FILM COATED, EXTENDED RELEASE ORAL at 21:51

## 2021-05-15 RX ADMIN — HEPARIN SODIUM 7500 UNITS: 5000 INJECTION INTRAVENOUS; SUBCUTANEOUS at 15:32

## 2021-05-15 NOTE — PROGRESS NOTES
INTERNAL MEDICINE RESIDENCY PROGRESS NOTE     Name: Nicole Matos   Age & Sex: 60 y o  female   MRN: [id number]  Unit/Bed#: CW2 218-01   Encounter: [id number]  Team: SOD Team B     PATIENT INFORMATION     Name: Nicole Matos   Age & Sex: 60 y o  female   MRN: [id number]  Hospital Stay Days: 13    ASSESSMENT/PLAN     Principal Problem:    Ambulatory dysfunction  Active Problems:    Fibromyalgia    Bipolar II disorder (HCC)    Generalized anxiety disorder    Hypertension    Insomnia    Migraine    Opioid dependence ([address] )    Left hip pain    Post traumatic stress disorder    Metatarsal fracture    Chronic back pain    Nausea      Nausea  Assessment & Plan  Patient reports nausea, comes and goes  Initially no change with Zofran x 1 or tums  No BM in 2 days  She has history of abdominal approach to back surgery in 2018, otherwise no abdominal surgeries  - continue Zofran PRN, TUMS PRN  - continue to monitor    Metatarsal fracture  Assessment & Plan  Mechanical fall 2 days ago, imaging at [facility] showed 2nd, 3rd, 4th non displaced fracture of metatarsals  Patient has been bearing weight on the right foot in ambulating around her house, this morning she reports the pain was too severe and she came into the ED, patient is requesting  Placement into a nursing facility     Xray obtained in ER reviewed: Unchanged nondisplaced fractures at the bases of the 2nd and 4th metatarsals  Small intra-articular fracture at the base of the 3rd metatarsal   No significant widening between the 1st and 2nd metatarsals to suggest Lisfranc injury  - podiatry consulted - no surgical intervention indicated  - NWB to RLE  - pain management with home Lyrica, morphine, Toradol 10mg PO for moderate pain (5 days, now  with no doses given), oxycodone 5mg daily PRN for severe pain  - rehab placement pending    Left hip pain  Assessment & Plan  History of bilateral hip OA   Patient reports worsening of chronic left hip pain since her fall  Notes pain is worse with laying on left side  Associated numbness and tingling on anterolateral thigh  No groin pain  ROM intact, no pain with internal or external rotation, though difficulty weight bearing per RN  - suspect trochanteric bursitis and possibly concurrent meralgia paresthetica based on exam and description  - doubt acute joint pathology at this time  - Toradol order in place x 5 days, patient had not received any, can reorder if pain recurs vs consider other NSAID  - continue home pregabalin    Opioid dependence ([facility] [address] )  Assessment & Plan  PDMP reviewed  Receives pregabalin, MS Contin 15mg BID, and oxycodone 5mg daily PRN  - continue home regimen      Generalized anxiety disorder  Assessment & Plan  -continue home meds    Bipolar II disorder [facility])  Assessment & Plan  Psych consult requested by [facility] for placement; consult placed 5/11  - psychiatry team has cleared patient for discharge to rehab    * Ambulatory dysfunction  Assessment & Plan  Present at baseline though worsened in the setting of metatarsal fracture  - rehab placement pending    Tachycardia-resolved as of 5/4/2021  Assessment & Plan  Tachycardic on vitals checks overnight  Not on tele  No history of arrhythmia  Patient denies symptoms  She was noted by nursing to be very anxious for much of the night, possibly related to her son visiting  In the morning, HR normal while sleeping   - likely related to anxiety  - monitor with routine vitals  - can add tele if recurrent    CALE (acute kidney injury) (HCC)-resolved as of 5/4/2021  Assessment & Plan  Creatinine has acutely risen to 1 5 on morning labs, from 1 0 the previous evening   BUN 6->12 so not clearly prerenal, but given low BP will try IV hydration   - hold morphine, replace with oxycodone  - hold NSAIDs  - IV hydration:  cc bolus   - resolved  - encourage PO fluids  - check UA  - monitor I/Os  - follow BMP in AM        Disposition: Pending placement SUBJECTIVE     Patient seen and examined  No acute events overnight  Slept well  Denies pain  OBJECTIVE     Vitals:    21 1545 21 2027 21 2337 05/15/21 0928   BP: 106/68  112/55 128/77   BP Location:   Left arm    Pulse: 56  65 70   Resp: 19  18    Temp: 98 6 °F (37 °C)  97 8 °F (36 6 °C)    TempSrc:   Oral    SpO2: 91% 95% 95% 97%      Temperature:   Temp (24hrs), Av 2 °F (36 8 °C), Min:97 8 °F (36 6 °C), Max:98 6 °F (37 °C)    Temperature: 97 8 °F (36 6 °C)  Intake & Output:  I/O        07 -  0700  07 - 05/15 0700 05/15 07 -  0700    P  O  2040 830     Total Intake 2040 830     Urine 2750 1225     Stool 0      Total Output 2750 1225     Net -710 -395            Unmeasured Urine Occurrence  2 x     Unmeasured Stool Occurrence 2 x          Weights: There is no height or weight on file to calculate BMI  Weight (last 2 days)     None        Physical Exam  Constitutional:       Appearance: She is well-developed  HENT:      Head: Normocephalic and atraumatic  Eyes:      General: No scleral icterus  Conjunctiva/sclera: Conjunctivae normal    Cardiovascular:      Rate and Rhythm: Normal rate and regular rhythm  Heart sounds: Normal heart sounds  No murmur  Pulmonary:      Effort: Pulmonary effort is normal       Breath sounds: Normal breath sounds  No wheezing or rales  Abdominal:      General: Bowel sounds are normal  There is no distension  Palpations: Abdomen is soft  Tenderness: There is no abdominal tenderness  There is no guarding  Musculoskeletal:         General: No tenderness or deformity  Skin:     General: Skin is warm and dry  Findings: No erythema  Neurological:      General: No focal deficit present  Mental Status: She is alert and oriented to person, place, and time  Psychiatric:         Mood and Affect: Mood normal          Behavior: Behavior normal        LABORATORY DATA     Labs:  I have personally reviewed pertinent reports  Invalid input(s):  EOSPCT   Results from last 7 days   Lab Units 05/09/21  0513   POTASSIUM mmol/L 3 9   CHLORIDE mmol/L 109*   CO2 mmol/L 29   BUN mg/dL 9   CREATININE mg/dL 0 70   CALCIUM mg/dL 8 2*                            IMAGING & DIAGNOSTIC TESTING     Radiology Results: I have personally reviewed pertinent reports  Xr Foot 3+ Vw Left    Result Date: 5/4/2021  Impression: Severe hallux valgus deformity  Lisfranc alignment appears grossly maintained  Workstation performed: [id number]     Xr Foot 3+ Views Right    Result Date: 5/2/2021  Impression: Unchanged fractures of the 2nd and 4th metatarsals  Small intra-articular fracture base of 3rd metatarsal  Workstation performed: [id number]     Ct Lower Extremity Wo Contrast Right    Result Date: 5/4/2021  Impression: 1  Intact Lisfranc ligament  2   Mildly displaced fractures noted at the 2nd through 4th metatarsal bases without intra-articular component and without additional fracture identified  Workstation performed: [id number]     Other Diagnostic Testing: I have personally reviewed pertinent reports      ACTIVE MEDICATIONS     Current Facility-Administered Medications   Medication Dose Route Frequency    acetaminophen (TYLENOL) tablet 975 mg  975 mg Oral Q8H [facility]    amLODIPine (NORVASC) tablet 5 mg  5 mg Oral Daily    busPIRone (BUSPAR) tablet 15 mg  15 mg Oral TID    calcium carbonate (TUMS) chewable tablet 500 mg  500 mg Oral Daily PRN    cholecalciferol (VITAMIN D3) tablet 1,000 Units  1,000 Units Oral Daily    Diclofenac Sodium (VOLTAREN) 1 % topical gel 2 g  2 g Topical 4x Daily    DULoxetine (CYMBALTA) delayed release capsule 60 mg  60 mg Oral Daily    heparin (porcine) subcutaneous injection 7,500 Units  7,500 Units Subcutaneous Q8H [facility]    hydrOXYzine HCL (ATARAX) tablet 25 mg  25 mg Oral Q6H PRN    ibuprofen (MOTRIN) tablet 400 mg  400 mg Oral Q6H PRN    morphine (MS CONTIN) ER tablet 15 mg  15 mg Oral Q12H [address]    nortriptyline (PAMELOR) capsule 10 mg  10 mg Oral HS    ondansetron (ZOFRAN) injection 4 mg  4 mg Intravenous Q6H PRN    oxybutynin (DITROPAN) tablet 5 mg  5 mg Oral BID    oxyCODONE (ROXICODONE) IR tablet 5 mg  5 mg Oral Daily PRN    pantoprazole (PROTONIX) EC tablet 40 mg  40 mg Oral Early Morning    polyethylene glycol (MIRALAX) packet 17 g  17 g Oral Daily    prazosin (MINIPRESS) capsule 2 mg  2 mg Oral Daily    pregabalin (LYRICA) capsule 200 mg  200 mg Oral BID    And    pregabalin (LYRICA) capsule 25 mg  25 mg Oral BID    propranolol (INDERAL) tablet 20 mg  20 mg Oral BID    QUEtiapine (SEROquel) tablet 300 mg  300 mg Oral HS    senna-docusate sodium (SENOKOT S) 8 6-50 mg per tablet 1 tablet  1 tablet Oral BID    sodium chloride 0 9 % infusion  20 mL/hr Intravenous Continuous PRN    Valbenazine Tosylate CAPS 80 mg  80 mg Oral Daily       VTE Pharmacologic Prophylaxis: Heparin  VTE Mechanical Prophylaxis: sequential compression device    Portions of the record may have been created with voice recognition software  Occasional wrong word or "sound a like" substitutions may have occurred due to the inherent limitations of voice recognition software    Read the chart carefully and recognize, using context, where substitutions have occurred   ==  Roel Strange DO  [facility]  Internal Medicine Residency PGY-3

## 2021-05-16 LAB — GLUCOSE SERPL-MCNC: 98 MG/DL (ref 65–140)

## 2021-05-16 PROCEDURE — 82948 REAGENT STRIP/BLOOD GLUCOSE: CPT

## 2021-05-16 PROCEDURE — 99232 SBSQ HOSP IP/OBS MODERATE 35: CPT | Performed by: INTERNAL MEDICINE

## 2021-05-16 RX ORDER — QUETIAPINE FUMARATE 100 MG/1
100 TABLET, FILM COATED ORAL ONCE
Status: COMPLETED | OUTPATIENT
Start: 2021-05-16 | End: 2021-05-16

## 2021-05-16 RX ORDER — ONDANSETRON 4 MG/1
4 TABLET, ORALLY DISINTEGRATING ORAL EVERY 6 HOURS PRN
Status: DISCONTINUED | OUTPATIENT
Start: 2021-05-16 | End: 2021-05-17 | Stop reason: HOSPADM

## 2021-05-16 RX ORDER — HYDROXYZINE HYDROCHLORIDE 25 MG/1
25 TABLET, FILM COATED ORAL ONCE
Status: COMPLETED | OUTPATIENT
Start: 2021-05-16 | End: 2021-05-16

## 2021-05-16 RX ORDER — HYDROXYZINE HYDROCHLORIDE 25 MG/1
50 TABLET, FILM COATED ORAL EVERY 6 HOURS PRN
Status: DISCONTINUED | OUTPATIENT
Start: 2021-05-16 | End: 2021-05-17 | Stop reason: HOSPADM

## 2021-05-16 RX ORDER — QUETIAPINE FUMARATE 100 MG/1
400 TABLET, FILM COATED ORAL
Status: DISCONTINUED | OUTPATIENT
Start: 2021-05-16 | End: 2021-05-16

## 2021-05-16 RX ORDER — QUETIAPINE FUMARATE 100 MG/1
300 TABLET, FILM COATED ORAL
Status: DISCONTINUED | OUTPATIENT
Start: 2021-05-16 | End: 2021-05-17 | Stop reason: HOSPADM

## 2021-05-16 RX ADMIN — QUETIAPINE FUMARATE 300 MG: 100 TABLET ORAL at 22:06

## 2021-05-16 RX ADMIN — MORPHINE SULFATE 15 MG: 15 TABLET, FILM COATED, EXTENDED RELEASE ORAL at 22:06

## 2021-05-16 RX ADMIN — ONDANSETRON 4 MG: 4 TABLET, ORALLY DISINTEGRATING ORAL at 00:27

## 2021-05-16 RX ADMIN — POLYETHYLENE GLYCOL 3350 17 G: 17 POWDER, FOR SOLUTION ORAL at 08:31

## 2021-05-16 RX ADMIN — MORPHINE SULFATE 15 MG: 15 TABLET, FILM COATED, EXTENDED RELEASE ORAL at 08:31

## 2021-05-16 RX ADMIN — PREGABALIN 25 MG: 25 CAPSULE ORAL at 08:31

## 2021-05-16 RX ADMIN — OXYBUTYNIN CHLORIDE 5 MG: 5 TABLET ORAL at 08:30

## 2021-05-16 RX ADMIN — PRAZOSIN HYDROCHLORIDE 2 MG: 2 CAPSULE ORAL at 08:32

## 2021-05-16 RX ADMIN — HEPARIN SODIUM 7500 UNITS: 5000 INJECTION INTRAVENOUS; SUBCUTANEOUS at 22:08

## 2021-05-16 RX ADMIN — HEPARIN SODIUM 7500 UNITS: 5000 INJECTION INTRAVENOUS; SUBCUTANEOUS at 06:08

## 2021-05-16 RX ADMIN — PREGABALIN 200 MG: 100 CAPSULE ORAL at 22:07

## 2021-05-16 RX ADMIN — PANTOPRAZOLE SODIUM 40 MG: 40 TABLET, DELAYED RELEASE ORAL at 06:08

## 2021-05-16 RX ADMIN — PROPRANOLOL HYDROCHLORIDE 20 MG: 20 TABLET ORAL at 17:37

## 2021-05-16 RX ADMIN — Medication 1000 UNITS: at 08:30

## 2021-05-16 RX ADMIN — BUSPIRONE HYDROCHLORIDE 15 MG: 10 TABLET ORAL at 17:36

## 2021-05-16 RX ADMIN — ACETAMINOPHEN 975 MG: 325 TABLET ORAL at 06:08

## 2021-05-16 RX ADMIN — HYDROXYZINE HYDROCHLORIDE 25 MG: 25 TABLET ORAL at 02:42

## 2021-05-16 RX ADMIN — ACETAMINOPHEN 975 MG: 325 TABLET ORAL at 13:19

## 2021-05-16 RX ADMIN — OXYBUTYNIN CHLORIDE 5 MG: 5 TABLET ORAL at 17:36

## 2021-05-16 RX ADMIN — PROPRANOLOL HYDROCHLORIDE 20 MG: 20 TABLET ORAL at 08:32

## 2021-05-16 RX ADMIN — DULOXETINE HYDROCHLORIDE 60 MG: 60 CAPSULE, DELAYED RELEASE ORAL at 08:31

## 2021-05-16 RX ADMIN — QUETIAPINE FUMARATE 100 MG: 100 TABLET ORAL at 02:42

## 2021-05-16 RX ADMIN — DOCUSATE SODIUM AND SENNOSIDES 1 TABLET: 8.6; 5 TABLET ORAL at 08:30

## 2021-05-16 RX ADMIN — HYDROXYZINE HYDROCHLORIDE 50 MG: 25 TABLET, FILM COATED ORAL at 13:00

## 2021-05-16 RX ADMIN — NORTRIPTYLINE HYDROCHLORIDE 10 MG: 10 CAPSULE ORAL at 22:59

## 2021-05-16 RX ADMIN — BUSPIRONE HYDROCHLORIDE 15 MG: 10 TABLET ORAL at 08:29

## 2021-05-16 RX ADMIN — PREGABALIN 25 MG: 25 CAPSULE ORAL at 22:08

## 2021-05-16 RX ADMIN — ACETAMINOPHEN 975 MG: 325 TABLET ORAL at 22:06

## 2021-05-16 RX ADMIN — HYDROXYZINE HYDROCHLORIDE 50 MG: 25 TABLET, FILM COATED ORAL at 22:08

## 2021-05-16 RX ADMIN — HEPARIN SODIUM 7500 UNITS: 5000 INJECTION INTRAVENOUS; SUBCUTANEOUS at 13:19

## 2021-05-16 RX ADMIN — DICLOFENAC SODIUM 2 G: 10 GEL TOPICAL at 08:33

## 2021-05-16 RX ADMIN — PREGABALIN 200 MG: 100 CAPSULE ORAL at 08:30

## 2021-05-16 RX ADMIN — DICLOFENAC SODIUM 2 G: 10 GEL TOPICAL at 17:36

## 2021-05-16 RX ADMIN — BUSPIRONE HYDROCHLORIDE 15 MG: 10 TABLET ORAL at 22:06

## 2021-05-16 RX ADMIN — AMLODIPINE BESYLATE 5 MG: 5 TABLET ORAL at 08:32

## 2021-05-16 RX ADMIN — DOCUSATE SODIUM AND SENNOSIDES 1 TABLET: 8.6; 5 TABLET ORAL at 17:36

## 2021-05-16 NOTE — PROGRESS NOTES
INTERNAL MEDICINE RESIDENCY PROGRESS NOTE     Name: Patsy Chavez   Age & Sex: 60 y o  female   MRN: [id number]  Unit/Bed#: CW2 218-01   Encounter: [id number]  Team: SOD Team B     PATIENT INFORMATION     Name: Patsy Chavez   Age & Sex: 60 y o  female   MRN: [id number]  Hospital Stay Days: 14    ASSESSMENT/PLAN     Principal Problem:    Ambulatory dysfunction  Active Problems:    Metatarsal fracture    Fibromyalgia    Bipolar II disorder ([facility])    Generalized anxiety disorder    Hypertension    Insomnia    Migraine    Opioid dependence ([facility] [address] )    Left hip pain    Post traumatic stress disorder    Chronic back pain    Nausea      Metatarsal fracture  Assessment & Plan  Mechanical fall 2 days ago, imaging at [facility] ER showed 2nd, 3rd, 4th non displaced fracture of metatarsals  Patient has been bearing weight on the right foot in ambulating around her house, this morning she reports the pain was too severe and she came into the ED, patient is requesting  Placement into a nursing facility     Xray obtained in ER reviewed: Unchanged nondisplaced fractures at the bases of the 2nd and 4th metatarsals  Small intra-articular fracture at the base of the 3rd metatarsal   No significant widening between the 1st and 2nd metatarsals to suggest Lisfranc injury  - podiatry consulted - no surgical intervention indicated  - NWB to RLE  - pain management with home Lyrica, morphine, Toradol 10mg PO for moderate pain (5 days, now  with no doses given), oxycodone 5mg daily PRN for severe pain  - rehab placement pending    * Ambulatory dysfunction  Assessment & Plan  Present at baseline though worsened in the setting of metatarsal fracture  - rehab placement pending    Nausea  Assessment & Plan  Patient reports nausea, comes and goes  Initially no change with Zofran x 1 or tums  No BM in 2 days  She has history of abdominal approach to back surgery in 2018, otherwise no abdominal surgeries    - continue Zofran PRN, TUMS PRN  - continue to monitor    Left hip pain  Assessment & Plan  History of bilateral hip OA  Patient reports worsening of chronic left hip pain since her fall  Notes pain is worse with laying on left side  Associated numbness and tingling on anterolateral thigh  No groin pain  ROM intact, no pain with internal or external rotation, though difficulty weight bearing per RN  - suspect trochanteric bursitis and possibly concurrent meralgia paresthetica based on exam and description  - doubt acute joint pathology at this time  - Toradol order in place x 5 days, patient had not received any, can reorder if pain recurs vs consider other NSAID  - continue home pregabalin    Opioid dependence ([facility] [address] )  Assessment & Plan  PDMP reviewed  Receives pregabalin, MS Contin 15mg BID, and oxycodone 5mg daily PRN  - continue home regimen      Generalized anxiety disorder  Assessment & Plan  -continue home meds    Bipolar II disorder [facility])  Assessment & Plan  Psych consult [place] by county for placement; consult placed 5/11  - psychiatry team has cleared patient for discharge to rehab    Tachycardia-resolved as of 5/4/2021  [address] on vitals checks overnight  Not on tele  No history of arrhythmia  Patient denies symptoms  She was noted by nursing to be very anxious for much of the night, possibly related to her son visiting  In the morning, HR normal while sleeping   - likely related to anxiety  - monitor with routine vitals  - can add tele if recurrent    CALE (acute kidney injury) (HCC)-resolved as of 5/4/2021  Assessment & Plan  Creatinine has acutely risen to 1 5 on morning labs, from 1 0 the previous evening   BUN 6->12 so not clearly prerenal, but given low BP will try IV hydration   - hold morphine, replace with oxycodone  - hold NSAIDs  - IV hydration:  cc bolus   - resolved  - encourage PO fluids  - check UA  - monitor I/Os  - follow BMP in AM        Disposition:  Placement to rehab SUBJECTIVE     Patient seen and examined  No acute events overnight  Alert and oriented x3  Admits to having some right lower extremity pain  OBJECTIVE     Vitals:    21 0833 21 0834 21 0835 21 0836   BP:       Pulse: 81 76 82 79   Resp:       Temp: 98 9 °F (37 2 °C)      TempSrc:       SpO2: 95% 96% 97% 97%      Temperature:   Temp (24hrs), Av 4 °F (36 9 °C), Min:97 9 °F (36 6 °C), Max:98 9 °F (37 2 °C)    Temperature: 98 9 °F (37 2 °C)  Intake & Output:  I/O        07 - 05/15 0700 05/15 07 -  0700  07 -  0700    P  O  830 942     Total Intake 830 942     Urine 1225 1600     Stool  0     Total Output 1225 1600     Net -395 -658            Unmeasured Urine Occurrence 2 x 1 x     Unmeasured Stool Occurrence  1 x         Weights: There is no height or weight on file to calculate BMI  Weight (last 2 days)     None        Physical Exam  Vitals signs reviewed  Constitutional:       General: She is not in acute distress  Appearance: She is well-developed  She is not diaphoretic  HENT:      Head: Normocephalic and atraumatic  Nose: Nose normal       Mouth/Throat:      Pharynx: No oropharyngeal exudate  Eyes:      General: No scleral icterus  Conjunctiva/sclera: Conjunctivae normal    Neck:      Musculoskeletal: Neck supple  Thyroid: No thyromegaly  Vascular: No JVD  Trachea: No tracheal deviation  Cardiovascular:      Rate and Rhythm: Normal rate and regular rhythm  Heart sounds: Normal heart sounds  No murmur  No friction rub  No gallop  Pulmonary:      Effort: Pulmonary effort is normal  No respiratory distress  Breath sounds: Normal breath sounds  No stridor  No wheezing or rales  Chest:      Chest wall: No tenderness  Abdominal:      General: Bowel sounds are normal  There is no distension  Palpations: Abdomen is soft  There is no mass  Tenderness: There is no abdominal tenderness   There is no guarding or rebound  Musculoskeletal: Normal range of motion  General: No tenderness  Comments: Mild lower extremity pain at the site of fracture  Skin:     General: Skin is warm  Findings: No erythema or rash  Neurological:      Mental Status: She is alert and oriented to person, place, and time  Sensory: No sensory deficit  Psychiatric:         Behavior: Behavior normal          Thought Content: Thought content normal          Judgment: Judgment normal        LABORATORY DATA     Labs: I have personally reviewed pertinent reports  Invalid input(s):  EOSPCT       Invalid input(s): LABALBU                         IMAGING & DIAGNOSTIC TESTING     Radiology Results: I have personally reviewed pertinent reports  Xr Foot 3+ Vw Left    Result Date: 5/4/2021  Impression: Severe hallux valgus deformity  Lisfranc alignment appears grossly maintained  Workstation performed: [id number]     Xr Foot 3+ Views Right    Result Date: 5/2/2021  Impression: Unchanged fractures of the 2nd and 4th metatarsals  Small intra-articular fracture base of 3rd metatarsal  Workstation performed: [id number]     Ct Lower Extremity Wo Contrast Right    Result Date: 5/4/2021  Impression: 1  Intact Lisfranc ligament  2   Mildly displaced fractures noted at the 2nd through 4th metatarsal bases without intra-articular component and without additional fracture identified  Workstation performed: [id number]     Other Diagnostic Testing: I have personally reviewed pertinent reports      ACTIVE MEDICATIONS     Current Facility-Administered Medications   Medication Dose Route Frequency    acetaminophen (TYLENOL) tablet 975 mg  975 mg Oral Q8H [address]    amLODIPine (NORVASC) tablet 5 mg  5 mg Oral Daily    busPIRone (BUSPAR) tablet 15 mg  15 mg Oral TID    calcium carbonate (TUMS) chewable tablet 500 mg  500 mg Oral Daily PRN    cholecalciferol (VITAMIN D3) tablet 1,000 Units  1,000 Units Oral Daily    Diclofenac Sodium (VOLTAREN) 1 % topical gel 2 g  2 g Topical 4x Daily    DULoxetine (CYMBALTA) delayed release capsule 60 mg  60 mg Oral Daily    heparin (porcine) subcutaneous injection 7,500 Units  7,500 Units Subcutaneous Q8H [facility]    hydrOXYzine HCL (ATARAX) tablet 50 mg  50 mg Oral Q6H PRN    ibuprofen (MOTRIN) tablet 400 mg  400 mg Oral Q6H PRN    morphine (MS CONTIN) ER tablet 15 mg  15 mg Oral Q12H [facility]    nortriptyline (PAMELOR) capsule 10 mg  10 mg Oral HS    ondansetron (ZOFRAN-ODT) dispersible tablet 4 mg  4 mg Oral Q6H PRN    oxybutynin (DITROPAN) tablet 5 mg  5 mg Oral BID    oxyCODONE (ROXICODONE) IR tablet 5 mg  5 mg Oral Daily PRN    pantoprazole (PROTONIX) EC tablet 40 mg  40 mg Oral Early Morning    polyethylene glycol (MIRALAX) packet 17 g  17 g Oral Daily    prazosin (MINIPRESS) capsule 2 mg  2 mg Oral Daily    pregabalin (LYRICA) capsule 200 mg  200 mg Oral BID    And    pregabalin (LYRICA) capsule 25 mg  25 mg Oral BID    propranolol (INDERAL) tablet 20 mg  20 mg Oral BID    QUEtiapine (SEROquel) tablet 300 mg  300 mg Oral HS    senna-docusate sodium (SENOKOT S) 8 6-50 mg per tablet 1 tablet  1 tablet Oral BID    sodium chloride 0 9 % infusion  20 mL/hr Intravenous Continuous PRN    Valbenazine Tosylate CAPS 80 mg  80 mg Oral Daily       VTE Pharmacologic Prophylaxis: Heparin  VTE Mechanical Prophylaxis: sequential compression device    Portions of the record may have been created with voice recognition software  Occasional wrong word or "sound a like" substitutions may have occurred due to the inherent limitations of voice recognition software    Read the chart carefully and recognize, using context, where substitutions have occurred   ==  Deepak Duenas, [facility]  Internal Medicine Residency PGY-2

## 2021-05-17 VITALS
DIASTOLIC BLOOD PRESSURE: 76 MMHG | HEART RATE: 72 BPM | TEMPERATURE: 97.8 F | SYSTOLIC BLOOD PRESSURE: 124 MMHG | OXYGEN SATURATION: 97 % | RESPIRATION RATE: 18 BRPM

## 2021-05-17 LAB — SARS-COV-2 RNA RESP QL NAA+PROBE: NEGATIVE

## 2021-05-17 PROCEDURE — 99238 HOSP IP/OBS DSCHRG MGMT 30/<: CPT | Performed by: INTERNAL MEDICINE

## 2021-05-17 PROCEDURE — 97535 SELF CARE MNGMENT TRAINING: CPT

## 2021-05-17 PROCEDURE — U0005 INFEC AGEN DETEC AMPLI PROBE: HCPCS | Performed by: STUDENT IN AN ORGANIZED HEALTH CARE EDUCATION/TRAINING PROGRAM

## 2021-05-17 PROCEDURE — 97530 THERAPEUTIC ACTIVITIES: CPT

## 2021-05-17 PROCEDURE — U0003 INFECTIOUS AGENT DETECTION BY NUCLEIC ACID (DNA OR RNA); SEVERE ACUTE RESPIRATORY SYNDROME CORONAVIRUS 2 (SARS-COV-2) (CORONAVIRUS DISEASE [COVID-19]), AMPLIFIED PROBE TECHNIQUE, MAKING USE OF HIGH THROUGHPUT TECHNOLOGIES AS DESCRIBED BY CMS-2020-01-R: HCPCS | Performed by: STUDENT IN AN ORGANIZED HEALTH CARE EDUCATION/TRAINING PROGRAM

## 2021-05-17 RX ADMIN — HEPARIN SODIUM 7500 UNITS: 5000 INJECTION INTRAVENOUS; SUBCUTANEOUS at 06:09

## 2021-05-17 RX ADMIN — MORPHINE SULFATE 15 MG: 15 TABLET, FILM COATED, EXTENDED RELEASE ORAL at 09:14

## 2021-05-17 RX ADMIN — BUSPIRONE HYDROCHLORIDE 15 MG: 10 TABLET ORAL at 09:14

## 2021-05-17 RX ADMIN — DULOXETINE HYDROCHLORIDE 60 MG: 60 CAPSULE, DELAYED RELEASE ORAL at 09:14

## 2021-05-17 RX ADMIN — Medication 1000 UNITS: at 09:14

## 2021-05-17 RX ADMIN — PANTOPRAZOLE SODIUM 40 MG: 40 TABLET, DELAYED RELEASE ORAL at 06:09

## 2021-05-17 RX ADMIN — ACETAMINOPHEN 975 MG: 325 TABLET ORAL at 06:09

## 2021-05-17 RX ADMIN — OXYBUTYNIN CHLORIDE 5 MG: 5 TABLET ORAL at 09:14

## 2021-05-17 RX ADMIN — PREGABALIN 200 MG: 100 CAPSULE ORAL at 09:14

## 2021-05-17 RX ADMIN — PRAZOSIN HYDROCHLORIDE 2 MG: 2 CAPSULE ORAL at 09:15

## 2021-05-17 RX ADMIN — PREGABALIN 25 MG: 25 CAPSULE ORAL at 09:16

## 2021-05-17 RX ADMIN — BUSPIRONE HYDROCHLORIDE 15 MG: 10 TABLET ORAL at 15:55

## 2021-05-17 RX ADMIN — PROPRANOLOL HYDROCHLORIDE 20 MG: 20 TABLET ORAL at 09:15

## 2021-05-17 RX ADMIN — HYDROXYZINE HYDROCHLORIDE 50 MG: 25 TABLET, FILM COATED ORAL at 10:33

## 2021-05-17 RX ADMIN — DICLOFENAC SODIUM 2 G: 10 GEL TOPICAL at 09:18

## 2021-05-17 RX ADMIN — ACETAMINOPHEN 975 MG: 325 TABLET ORAL at 14:56

## 2021-05-17 RX ADMIN — OXYCODONE HYDROCHLORIDE 5 MG: 5 TABLET ORAL at 06:09

## 2021-05-17 RX ADMIN — DOCUSATE SODIUM AND SENNOSIDES 1 TABLET: 8.6; 5 TABLET ORAL at 09:14

## 2021-05-17 RX ADMIN — AMLODIPINE BESYLATE 5 MG: 5 TABLET ORAL at 09:14

## 2021-05-17 NOTE — CASE MANAGEMENT
Pt is able to admit to -[place] tonight once covid results are in  CM confirmed a 4pm BLS to -B tonight with dispatcher Estephanie from [facility]  CMN Form and Facility Contacts were entered

## 2021-05-17 NOTE — PLAN OF CARE
Problem: OCCUPATIONAL THERAPY ADULT  Goal: Performs self-care activities at highest level of function for planned discharge setting  See evaluation for individualized goals  Description: Treatment Interventions: ADL retraining, Functional transfer training, Endurance training, UE strengthening/ROM, Cognitive reorientation, Patient/family training, Equipment evaluation/education, Compensatory technique education, Continued evaluation, Energy conservation, Activityengagement          See flowsheet documentation for full assessment, interventions and recommendations  Note: Limitation: Decreased ADL status, Decreased Safe judgement during ADL, Decreased endurance, Decreased self-care trans, Decreased high-level ADLs  Prognosis: Fair  Assessment: Patient participated in Skilled OT session 5/17/2021 with interventions consisting of ADL re training with the use of correct body mechnaics, Energy Conservation techniques, deep breathing technique, safety awareness and fall prevention techniques, maintaining weight bearing restrictions, therapeutic exercise to: increase functional use of BUEs, increase BUE muscle strength ,  therapeutic activities to: increase activity tolerance, increase postural control, increase trunk control and increase OOB/ sitting tolerance   Patient agreeable to OT treatment session, upon arrival patient was found supine in bed  In comparison to previous session, patient with improvements in EOB sitting tolerance, UB/LB ADLS, transfers, standing tolerance, and forward functional reach  Patient requiring verbal cues for safety, verbal cues for correct technique, verbal cues for pacing thru activity steps and frequent rest periods  Patient continues to be functioning below baseline level, occupational performance remains limited secondary to factors listed above and increased risk for falls and injury     From OT standpoint, recommendation at time of d/c would be Short Term Rehab when medically stable  Patient to benefit from continued Occupational Therapy treatment while in the hospital to address deficits as defined above and maximize level of functional independence with ADLs and functional mobility       OT Discharge Recommendation: Post acute rehabilitation services  OT - OK to Discharge: Yes(when medically stable)     Clarence Joy MS, OTR/L

## 2021-05-17 NOTE — PLAN OF CARE
Problem: PHYSICAL THERAPY ADULT  Goal: Performs mobility at highest level of function for planned discharge setting  See evaluation for individualized goals  Description: Treatment/Interventions: ADL retraining, Functional transfer training, LE strengthening/ROM, Elevations, Therapeutic exercise, Endurance training, Cognitive reorientation, Patient/family training, Equipment eval/education, Bed mobility, Compensatory technique education, Continued evaluation, Spoke to nursing, OT  Equipment Recommended: Wheelchair       See flowsheet documentation for full assessment, interventions and recommendations  Outcome: Progressing  Note: Prognosis: Fair  Problem List: Decreased strength, Decreased endurance, Impaired balance, Decreased mobility, Decreased cognition, Obesity, Orthopedic restrictions  Assessment: Pt cont to demonstrate mod functional mobility deficits requiring (A)x 1-2 for transfers trials at bedside while maintaining NWB on (R) LE; pt exhibits overall weakness and deconditioning w/ associated balance/standing tolerance deficits --> unable to progress to amb trials at this time; overall, cont to recommend rehab upon D/C when medically cleared; will follow  Barriers to Discharge: Decreased caregiver support, Inaccessible home environment        PT Discharge Recommendation: Post acute rehabilitation services     PT - OK to Discharge: (S) Yes(when stable to rehab)    See flowsheet documentation for full assessment

## 2021-05-17 NOTE — DISCHARGE SUMMARY
INTERNAL MEDICINE RESIDENCY DISCHARGE SUMMARY     Samantha Workman   60 y o  female  MRN: [id number]  Room/Bed: [facility] 218/[facility] [address]   Encounter: [id number]    Principal Problem:    Ambulatory dysfunction  Active Problems:    Fibromyalgia    Bipolar II disorder ([facility])    Generalized anxiety disorder    Hypertension    Insomnia    Migraine    Opioid dependence ([address] )    Left hip pain    Post traumatic stress disorder    Metatarsal fracture    Chronic back pain    Nausea        [address]     Patient is a 60years old female with past medical history of bipolar 2 disorder, PTSD, panic attacks, fibromyalgia, chronic pain, hypertension, urinary incontinence/ overactive bladder and migraines presented on 05/02 with ambulatory dysfunction in the setting of metatarsal fracture  Patient experienced a mechanical fall 2 days prior to presentation when ambulating at home with a walker  She presented to the ED and was found to have 2nd, 3rd and 4th metatarsal fractures nondisplaced  She was placed in a surgical boot with planned follow-up with Podiatry as outpatient however she could not tolerate the pain and called EMS and brought back to the hospital   She requested to be admitted to a nursing home  She was evaluated by Podiatry as inpatient who recommended CT scan to rule out Lisfranc fracture dislocation  Fortunately CT showed intact Lisfranc ligament and mildly displaced fracture at the 2nd through 4th metatarsal bases without intra-articular component and without additional fractures identified  Podiatry recommended conservative management, NWB to RLE with posterior splint  Hospitalization was complicated by an episode of CALE with creatinine rising to 1 5 from 1 which quickly resolved with IV fluids  Patient was also noted to have intermittent episodes of tachycardia likely secondary to anxiety    Patient was accepted to [facility] for subacute rehab  She was evaluated by Psychiatry and was cleared to go to SNF  She was deemed medically stable for discharge on 05/17/2021 when bed was available  Transport arranged by   On the day of discharge, patient was seen and examined at bedside  Offered no acute complaints  Visit Vitals  /77   Pulse 55   Temp 97 8 °F (36 6 °C)   Resp 18   SpO2 96%   OB Status Hysterectomy   Smoking Status Never Smoker       Physical Exam  Vitals signs and nursing note reviewed  Constitutional:       General: She is not in acute distress  Appearance: She is well-developed  HENT:      Head: Normocephalic and atraumatic  Eyes:      Conjunctiva/sclera: Conjunctivae normal    Neck:      Musculoskeletal: Neck supple  Cardiovascular:      Rate and Rhythm: Normal rate and regular rhythm  Heart sounds: No murmur  Pulmonary:      Effort: Pulmonary effort is normal  No respiratory distress  Breath sounds: Normal breath sounds  Abdominal:      Palpations: Abdomen is soft  Tenderness: There is no abdominal tenderness  Skin:     General: Skin is warm and dry  Neurological:      Mental Status: She is alert  DISCHARGE INFORMATION     PCP at Discharge: Laraine Siemens     Admitting Provider: Mirna Aguila MD  Admission Date: 5/2/2021    Discharge Provider: Shanda Self MD  Discharge Date: 05/17/2021    Discharge Disposition: Home/Self Care  Discharge Condition: good  Discharge with Lines: no    Discharge Diet: regular diet  Activity Restrictions:   Per rehab recommendations  Test Results Pending at Discharge:  None      Discharge Diagnoses:  Principal Problem:    Ambulatory dysfunction  Active Problems:    Fibromyalgia    Bipolar II disorder (HCC)    Generalized anxiety disorder    Hypertension    Insomnia    Migraine    Opioid dependence (HCC)    Left hip pain    Post traumatic stress disorder    Metatarsal fracture    Chronic back pain    Nausea  Resolved Problems:    CALE (acute kidney injury) ([facility] )    Tachycardia      Consulting Providers:    Podiatry    Diagnostic & Therapeutic Procedures Performed:  Xr Foot 3+ Vw Left    Result Date: 5/4/2021  Impression: Severe hallux valgus deformity  Lisfranc alignment appears grossly maintained  Workstation performed: [id number]     Xr Foot 3+ Views Right    Result Date: 5/2/2021  Impression: Unchanged fractures of the 2nd and 4th metatarsals  Small intra-articular fracture base of 3rd metatarsal  Workstation performed: [id number]     Ct Lower Extremity Wo Contrast Right    Result Date: 5/4/2021  Impression: 1  Intact Lisfranc ligament  2   Mildly displaced fractures noted at the 2nd through 4th metatarsal bases without intra-articular component and without additional fracture identified   Workstation performed: [id number]       Code Status: Level 1 - Full Code  Advance Directive & Living Will: <no information>  Power of :    POLST:      Medications:  Current Discharge Medication List        Current Discharge Medication List        Current Discharge Medication List      CONTINUE these medications which have NOT CHANGED    Details   acetaminophen (TYLENOL) 325 mg tablet Take 2 tablets (650 mg total) by mouth every 8 (eight) hours as needed for mild pain  Qty: 60 tablet, Refills: 2    Associated Diagnoses: Ambulatory dysfunction; Lumbar radiculopathy      amLODIPine (NORVASC) 5 mg tablet Take 1 tablet (5 mg total) by mouth daily  Qty: 90 tablet, Refills: 3    Associated Diagnoses: Hypertension, unspecified type      bisacodyl (DULCOLAX) 5 mg EC tablet Take 5 mg by mouth daily as needed for constipation      busPIRone (BUSPAR) 15 mg tablet Take 1 tablet (15 mg total) by mouth 3 (three) times a day  Qty: 90 tablet, Refills: 0    Associated Diagnoses: Generalized anxiety disorder      cetirizine (ZyrTEC) 10 mg tablet Take 10 mg by mouth as needed       cholecalciferol (VITAMIN D3) 1,000 units tablet Take 1 tablet (1,000 Units total) by mouth daily  Qty: 90 tablet, Refills: 5    Associated Diagnoses: Vitamin D deficiency      [employer] Vitamin C 500 MG tablet TAKE 1 TABLET BY MOUTH TWICE A DAY  Qty: 60 tablet, Refills: 0    Associated Diagnoses: Primary osteoarthritis of right knee; Preop testing      Diapers & Supplies (Huggies Pull-Ups) MISC Use 3 (three) times a day  Qty: 100 each, Refills: 3    Associated Diagnoses: Urge incontinence of urine      diclofenac sodium (VOLTAREN) 1 % APPLY 4 G TOPICALLY 4 (FOUR) TIMES A DAY  Qty: 100 g, Refills: 0    Associated Diagnoses: Lumbar radiculopathy      DULoxetine (CYMBALTA) 60 mg delayed release capsule TAKE 1 CAPSULE BY MOUTH EVERY DAY  Qty: 30 capsule, Refills: 0    Associated Diagnoses: Generalized anxiety disorder      ferrous sulfate 324 (65 Fe) mg TAKE 1 TABLET (324 MG TOTAL) BY MOUTH 2 (TWO) TIMES A DAY BEFORE MEALS  Qty: 60 tablet, Refills: 1    Associated Diagnoses: Primary osteoarthritis of right knee; Preop testing      folic acid (FOLVITE) 1 mg tablet TAKE 1 TABLET BY MOUTH EVERY DAY  Qty: 30 tablet, Refills: 1    Associated Diagnoses: Primary osteoarthritis of right knee; Preop testing      hydrOXYzine HCL (ATARAX) 50 mg tablet Take 1 tablet (50 mg total) by mouth daily at bedtime as needed for anxiety (insomnia)  Qty: 90 tablet, Refills: 0    Associated Diagnoses: Anxiety      ipratropium-albuterol (DUO-NEB) 0 5-2 5 mg/3 mL nebulizer solution Take 1 vial (3 mL total) by nebulization every 6 (six) hours as needed for wheezing or shortness of breath  Qty: 3 mL, Refills: 2    Associated Diagnoses: Dyspnea      lidocaine (LMX) 4 % cream Apply topically as needed for mild pain  Qty: 30 g, Refills: 0    Associated Diagnoses: Lumbar radiculopathy      LORazepam (ATIVAN) 0 5 mg tablet Take 1 tablet (0 5 mg total) by mouth every 8 (eight) hours as needed for anxiety for up to 10 days Hold if sedated  Qty: 20 tablet, Refills: 0    Associated Diagnoses: Ambulatory dysfunction morphine (MS CONTIN) 15 mg 12 hr tablet Take 1 tablet (15 mg total) by mouth 2 (two) times a day Hold if sedatedMax Daily Amount: 30 mg  Qty: 30 tablet, Refills: 0    Comments: PDMP checked and verified  Associated Diagnoses: Lumbar radiculopathy; Chronic pain disorder      Mouthwashes (Biotene Dry Mouth) LIQD Apply 5 mL to the mouth or throat daily      Multiple Vitamins-Minerals (multivitamin with minerals) tablet Take 1 tablet by mouth daily  Qty: 30 tablet, Refills: 1    Associated Diagnoses: Primary osteoarthritis of right knee; Preop testing      naloxone (NARCAN) 4 mg/0 1 mL nasal spray Administer 1 spray into a nostril  If no response after 2-3 minutes, give another dose in the other nostril using a new spray    Qty: 1 each, Refills: 1    Associated Diagnoses: Lumbar radiculopathy; Chronic pain disorder      nortriptyline (PAMELOR) 10 mg capsule Take 1 capsule (10 mg total) by mouth daily at bedtime  Qty: 7 capsule, Refills: 0    Associated Diagnoses: Insomnia      omeprazole (PriLOSEC) 20 mg delayed release capsule Take 1 capsule (20 mg total) by mouth daily  Qty: 90 capsule, Refills: 3    Associated Diagnoses: Gastroesophageal reflux disease without esophagitis      ondansetron (ZOFRAN-ODT) 4 mg disintegrating tablet Take 1 tablet (4 mg total) by mouth every 6 (six) hours as needed for nausea or vomiting  Qty: 20 tablet, Refills: 0    Associated Diagnoses: Vomiting      oxybutynin (DITROPAN) 5 mg tablet Take 1 tablet (5 mg total) by mouth 2 (two) times a day  Qty: 180 tablet, Refills: 3    Associated Diagnoses: Urge incontinence of urine      oxyCODONE (ROXICODONE) 5 mg immediate release tablet Take 1 tablet (5 mg total) by mouth dailyMax Daily Amount: 5 mg  Qty: 15 tablet, Refills: 0    Associated Diagnoses: Lumbar radiculopathy; Chronic pain disorder      Polyethylene Glycol 3350 (MIRALAX PO) Take by mouth      potassium chloride (K-DUR,KLOR-CON) 10 mEq tablet Take 2 tablets (20 mEq total) by mouth 2 (two) times a day for 2 days  Qty: 8 tablet, Refills: 0    Associated Diagnoses: Hypokalemia      potassium chloride (MICRO-K) 10 MEQ CR capsule Take 10 mEq by mouth 2 (two) times a day Two tabs, two times daily      prazosin (MINIPRESS) 2 mg capsule TAKE 1 CAPSULE BY MOUTH EVERY DAY  Qty: 30 capsule, Refills: 1    Associated Diagnoses: Hypertension, unspecified type      !! pregabalin (LYRICA) 150 mg capsule Take 1 capsule (150 mg total) by mouth 3 (three) times a day  Qty: 90 capsule, Refills: 2    Associated Diagnoses: Lumbar radiculopathy      !! pregabalin (LYRICA) 225 MG capsule TAKE 1 CAPSULE (225 MG TOTAL) BY MOUTH EVERY 12 (TWELVE) HOURS  Qty: 60 capsule, Refills: 0    Comments: Not to exceed 5 additional fills before 09/05/2021 DX Code Needed  PT REQUEST REFILL  Associated Diagnoses: Lumbar radiculopathy; Chronic pain disorder      propranolol (INDERAL) 20 mg tablet Take 1 tablet (20 mg total) by mouth 2 (two) times a day  Qty: 180 tablet, Refills: 3    Associated Diagnoses: Tremor      QUEtiapine (SEROquel) 300 mg tablet Take 300 mg by mouth daily at bedtime      Respiratory Therapy Supplies (Nebulizer) YUDY Use as needed (Shortness of breath)  Qty: 1 each, Refills: 0    Associated Diagnoses: Dyspnea      Sennosides (SENEXON PO) Take by mouth      SIMETHICONE PO Take by mouth as needed       sodium chloride (OCEAN) 0 65 % nasal spray 2 sprays into each nostril as needed      traZODone (DESYREL) 100 mg tablet Take 100 mg by mouth daily at bedtime 2 tablets daily at bedtime      triamcinolone (KENALOG) 0 025 % cream Apply topically 2 (two) times a day  Qty: 30 g, Refills: 0    Associated Diagnoses: Rash      Valbenazine Tosylate (Ingrezza) 80 MG CAPS Take 80 mg by mouth daily  Qty: 30 capsule, Refills: 1    Associated Diagnoses: Tardive dyskinesia       !! - Potential duplicate medications found  Please discuss with provider            Allergies:  No Known Allergies    FOLLOW-UP     PCP Outpatient Follow-up:  Follow-up with PCP in 1-2 weeks after discharge from rehab  Consulting Providers Follow-up:  Follow-up with Podiatry     Active Issues Requiring Follow-up:   Metatarsal fracture    Discharge Statement:   I spent 30 minutes minutes discharging the patient  This time was spent on the day of discharge  I had direct contact with the patient on the day of discharge  Additional documentation is required if more than 30 minutes were spent on discharge  Portions of the record may have been created with voice recognition software  Occasional wrong word or "sound a like" substitutions may have occurred due to the inherent limitations of voice recognition software    Read the chart carefully and recognize, using context, where substitutions have occurred     ==  Anna Weiner MD  [address]  Internal Medicine Resident PGY-2

## 2021-05-17 NOTE — TRANSPORTATION MEDICAL NECESSITY
Section I - General Information    Name of Patient: Chloe Gotti                 : 1965    Medicare #: [id number]  Transport Date: 21 (PCS is valid for round trips on this date and for all repetitive trips in the 60-day range as noted below )  Origin: [address]: [facility],   Is the pt's stay covered under Medicare Part A (PPS/DRG)   [x]     Closest appropriate facility? If no, why is transport to more distant facility required? Yes  If hospice pt, is this transport related to pt's terminal illness? NA       Section II - Medical Necessity Questionnaire  Ambulance transportation is medically necessary only if other means of transport are contraindicated or would be potentially harmful to the patient  To meet this requirement, the patient must either be "bed confined" or suffer from a condition such that transport by means other than ambulance is contraindicated by the patient's condition  The following questions must be answered by the medical professional signing below for this form to be valid:    1)  Describe the MEDICAL CONDITION (physical and/or mental) of this patient AT [address] [place] [address] that requires the patient to be transported in an ambulance and why transport by other means is contraindicated by the patient's condition: Ambulatory dysfunction, Assist x1, Non-weight bearing on RLE, limited by pain and fatigue, poor standing/sitting balance, high fall risk    2) Is the patient "bed confined" as defined below? Yes  To be "be confined" the patient must satisfy all three of the following conditions: (1) unable to get up from bed without Assistance; AND (2) unable to ambulate; AND (3) unable to sit in a chair or wheelchair  3) Can this patient safely be transported by car or wheelchair van (i e , seated during transport without a medical attendant or monitoring)? No    4) In addition to completing questions 1-3 above, please check any of the following conditions that apply*:   *Note: supporting documentation for any boxes checked must be maintained in the patient's medical records  If hosp-hosp transfer, describe services needed at 2nd facility not available at 1st facility? Moderate/severe pain on movement   Medical attendant required   Unable to tolerate seated position for time needed to transport   Unable to sit in a chair or wheelchair due to decubitus ulcers or other wounds   Other(specify) High fall risk, hip & back pain      Section III - Signature of Physician or Healthcare Professional  I certify that the above information is true and correct based on my evaluation of this patient, and represent that the patient requires transport by ambulance and that other forms of transport are contraindicated  I understand that this information will be used by the Centers for Medicare and Medicaid Services (CMS) to support the determination of medical necessity for ambulance services, and I represent that I have personal knowledge of the patient's condition at time of transport  [x]  If this box is checked, I also certify that the patient is physically or mentally incapable of signing the ambulance service's claim and that the institution with which I am affiliated has furnished care, services, or assistance to the patient  My signature below is made on behalf of the patient pursuant to 42 CFR §424 36(b)(4)   In accordance with 42 CFR §424 37, the specific reason(s) that the patient is physically or mentally incapable of signing the claim form is as follows:      Signature of Physician* or Healthcare Professional_____________________________________  Signature Date 05/17/21 (For scheduled repetitive transports, this form is not valid for transports performed more than 60 days after this date)    Printed Name & Credentials of Physician or Healthcare Professional (DO ANDRES, RN, etc )_IZABEL George_  *Form must be signed by patient's attending physician for scheduled, repetitive transports   For non-repetitive, unscheduled ambulance transports, if unable to obtain the signature of the attending physician, any of the following may sign (choose appropriate option below)  [] Physician Assistant []  Clinical Nurse Specialist []  Registered Nurse  []  Nurse Practitioner  [x] Discharge Planner

## 2021-05-17 NOTE — CASE MANAGEMENT
CM met with pt at bedside and informed that she would be discharging tonight at 4pm BLS  Pt is aware, IMM doc verbally reviewed with pt at bedside, pt reported that she does not wish to appeal at this time and is in agreement with dcp

## 2021-05-17 NOTE — CASE MANAGEMENT
Pt is able to admit to [facility] today  Will need a new covid test and updated PT/OT notes  Cm informed therapy and Dr Carlos Tracey of the same  Likely to discharge today via BLS, CM will continue to follow

## 2021-05-17 NOTE — OCCUPATIONAL THERAPY NOTE
Occupational Therapy Treatment Note      Samantha Kaplan    2021    Principal Problem:    Ambulatory dysfunction  Active Problems:    Fibromyalgia    Bipolar II disorder (HCC)    Generalized anxiety disorder    Hypertension    Insomnia    Migraine    Opioid dependence (HCC)    Left hip pain    Post traumatic stress disorder    Metatarsal fracture    Chronic back pain    Nausea      Past Medical History:   Diagnosis Date    Acid reflux     Anxiety     RESOLVED: [id number]    Arthritis     Bipolar 2 disorder (HCC)     FOLLOWS WITH PSYCHIATRIST  CONTINUE LAMOTRIGINE; RESOLVED: [id number]    Depression     Familial tremor     both hands    Fibromyalgia     LAST ASSESSED: [id number]    Hearing aid worn     left ear    [place] (hard of hearing)     left ear    Hypertension     Left-sided weakness     Lower back pain     Memory loss of unknown cause     long and short term    Migraine     Obesity     Obesity, Class II, BMI 35-39 9     Overactive bladder     Panic attack     Post traumatic stress disorder     Seasonal allergies     Stroke [facility])     questionable stroke 2009    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    CONT ASA 81 MG DAILY; RESOLVED: [id number]    Urinary incontinence     Wears dentures     partial lower / full upper    Wears glasses        Past Surgical History:   Procedure Laterality Date    BACK SURGERY       SECTION      COLONOSCOPY      RESOLVED: [id number]    EAR SURGERY      EGD      HYSTERECTOMY  2004    MYRINGOTOMY W/ TUBES Left     NECK SURGERY  2019    ND CYSTOURETHROSCOPY N/A 2016    Procedure: CYSTOSCOPY, BOTOX INJECTION;  Surgeon: Deloris Velasquez MD;  Location: [facility];  Service: Gynecology    ND IMPLANT SPINAL NEUROSTIM/ Right 2/10/2021    Procedure: REPLACEMENT IMPLANTABLE PULSE GENERATOR DORSAL SPINAL COLUMN STIMULATOR, RIGHT;  Surgeon: Mar Cheng MD;  Location: [facility];  Service: Neurosurgery    CT PERCUT IMPLNT NEUROELECT,EPIDURAL Right 7/28/2020    Procedure: INSERTION THORACIC DORSAL COLUMN SPINAL CORD STIMULATOR PERCUTANEOUS W IMPLANTABLE PULSE GENERATOR, RIGHT;  Surgeon: Amber Gimenez MD;  Location:  MAIN OR;  Service: Neurosurgery    [facility]    UPPER GASTROINTESTINAL ENDOSCOPY  09/2020 05/17/21 1142   OT Last Visit   OT Visit Date 05/17/21   Note Type   Note Type Treatment   Restrictions/Precautions   Weight Bearing Precautions Per Order Yes   RLE Weight Bearing Per Order NWB   Braces or Orthoses Splint; Other (Comment)  (RLE)   Other Precautions Cognitive; Bed Alarm;WBS;Fall Risk;Pain   Lifestyle   Autonomy I ADLs, assist IADLS, Mod I w/ transfers and functional mobility PTA   Reciprocal Relationships Pt lives w/ her son and aunt; he is not home during the day  He is the manager of    Service to Others Unemployed   Intrinsic Gratification Enjoys being more active   Pain Assessment   Pain Assessment Tool 0-10   Pain Score 6   Pain Location/Orientation Orientation: Bilateral;Location: Leg;Other (Comment)  (posterior)   Pain Onset/Description Onset: Ongoing; Descriptor: Aching;Descriptor: Discomfort   Patient's Stated Pain Goal No pain   Hospital Pain Intervention(s) Repositioned; Ambulation/increased activity; Emotional support   Pain Rating: FLACC (Rest) - Face 0   ADL   Where Assessed Edge of bed   Eating Assistance 7  Independent   Grooming Assistance 5  Supervision/Setup   UB Bathing Assistance 5  Supervision/Setup   UB Bathing Comments Pt simulated UB bathing, able to reach B/L arms, chest and stomach   LB Bathing Assistance 3  Moderate Assistance   LB Bathing Deficit Right upper leg;Left upper leg;Right lower leg including foot; Left lower leg including foot; Perineal area; Buttocks   LB Bathing Comments Pt able to bathe R/L upper halves of legs and shins while seated EOB  Pt able to reach L foot, unable to reach R foot   Assist to bathe perineal/buttocks in standing w/ Mod A   UB Dressing Assistance 5  Supervision/Setup   LB Dressing Assistance 3  Moderate Assistance   LB Dressing Deficit Thread RLE into underwear; Thread LLE into underwear;Don/doff L sock   LB Dressing Comments Pt able to doff L sock in seated position, assist to don L sock  Unable to don/doff clothing over R LE  Assist to thread R LE through underwear hole, pt able to thread L leg  Needed Mod A in standing to pull up over waist    Bed Mobility   Supine to Sit 5  Supervision   Additional items Increased time required;Verbal cues   Sit to Supine 5  Supervision   Additional items Increased time required;Verbal cues   Additional Comments Pt went from supine<>sit w/ S< HOB elevated for assist  Sat EOB w/ Fair sitting balance/trunk control   Transfers   Sit to Stand 3  Moderate assistance   Additional items Assist x 1; Increased time required;Verbal cues   Stand to Sit 3  Moderate assistance   Additional items Assist x 1; Increased time required;Verbal cues   Additional Comments Performed STS transfer from EOB w/ Mod A x1, heavy force production into standing  RW for support, VC for hand placement and carryover of WBS in R LE  Cognition   Overall Cognitive Status Impaired   Arousal/Participation Responsive; Cooperative   Attention Attends with cues to redirect   Orientation Level Oriented X4   Memory Decreased recall of precautions   Following Commands Follows one step commands without difficulty   Comments Pt is pleasant and cooperative; has some decreased safety awareness and understanding of deficits  Needs VC/TC for carryover of WBS     Activity Tolerance   Activity Tolerance Patient limited by fatigue;Patient limited by pain   Medical Staff Made Aware RN, CM   Assessment   Assessment Patient participated in Skilled OT session 5/17/2021 with interventions consisting of ADL re training with the use of correct body mechnaics, Energy Conservation techniques, deep breathing technique, safety awareness and fall prevention techniques, maintaining weight bearing restrictions, therapeutic exercise to: increase functional use of BUEs, increase BUE muscle strength ,  therapeutic activities to: increase activity tolerance, increase postural control, increase trunk control and increase OOB/ sitting tolerance   Patient agreeable to OT treatment session, upon arrival patient was found supine in bed  In comparison to previous session, patient with improvements in EOB sitting tolerance, UB/LB ADLS, transfers, standing tolerance, and forward functional reach  Patient requiring verbal cues for safety, verbal cues for correct technique, verbal cues for pacing thru activity steps and frequent rest periods  Patient continues to be functioning below baseline level, occupational performance remains limited secondary to factors listed above and increased risk for falls and injury  From OT standpoint, recommendation at time of d/c would be Short Term Rehab when medically stable  Patient to benefit from continued Occupational Therapy treatment while in the hospital to address deficits as defined above and maximize level of functional independence with ADLs and functional mobility  Plan   Treatment Interventions ADL retraining;Functional transfer training; Endurance training;UE strengthening/ROM; Cognitive reorientation;Patient/family training;Equipment evaluation/education; Compensatory technique education;Continued evaluation; Energy conservation; Activityengagement   Goal Expiration Date 05/31/21  (extension of goals additional 14 days)   OT Treatment Day 3   OT Frequency 3-5x/wk   Recommendation   OT Discharge Recommendation Post acute rehabilitation services   OT - OK to Discharge Yes  (when medically stable)   Additional Comments 2 The patient's raw score on the AM-PAC Daily Activity inpatient short form is 18, standardized score is 38 66, less than 39 4   Patients at this level are likely to benefit from discharge to post-acute rehabilitation services  Please refer to the recommendation of the Occupational Therapist for safe discharge planning     AM-PAC Daily Activity Inpatient   Lower Body Dressing 2   Bathing 2   Toileting 2   Upper Body Dressing 4   Grooming 4   Eating 4   Daily Activity Raw Score 18   Daily Activity Standardized Score (Calc for Raw Score >=11) 38 66   AM-PAC Applied Cognition Inpatient   Following a Speech/Presentation 3   Understanding Ordinary Conversation 4   Taking Medications 4   Remembering Where Things Are Placed or Put Away 4   Remembering List of 4-5 Errands 4   Taking Care of Complicated Tasks 3   Applied Cognition Raw Score 22   Applied Cognition Standardized Score 47 83       Tiffanie Thompson MS, OTR/L

## 2021-05-17 NOTE — PHYSICAL THERAPY NOTE
PHYSICAL THERAPY NOTE          Patient Name: Sheryl Condon  QSQWC'E Date: 5/17/2021 05/17/21 1127   PT Last Visit   PT Visit Date 05/17/21   Note Type   Note Type Treatment   Pain Assessment   Pain Assessment Tool FLACC   Pain Location/Orientation Orientation: Left; Location: Leg  (post thigh)   Pain Onset/Description Onset: Ongoing;Frequency: Intermittent; Descriptor: Aching;Descriptor: Discomfort  ("pulling")   Effect of Pain on Daily Activities guarding w/ mvt   Patient's Stated Pain Goal No pain   Hospital Pain Intervention(s) Repositioned; Ambulation/increased activity; Emotional support;Elevated   Pain Rating: FLACC (Rest) - Face 0   Pain Rating: FLACC (Rest) - Legs 0   Pain Rating: FLACC (Rest) - Activity 0   Pain Rating: FLACC (Rest) - Cry 0   Pain Rating: FLACC (Rest) - Consolability 0   Score: FLACC (Rest) 0   Pain Rating: FLACC (Activity) - Face 1   Pain Rating: FLACC (Activity) - Legs 0   Pain Rating: FLACC (Activity) - Activity 0   Pain Rating: FLACC (Activity) - Cry 1   Pain Rating: FLACC (Activity) - Consolability 1   Score: FLACC (Activity) 3   Restrictions/Precautions   RLE Weight Bearing Per Order NWB   Braces or Orthoses Splint  ((R) LE)   Other Precautions Cognitive;Multiple lines;WBS;Fall Risk  (bed alarm activated at the end of session)   General   Chart Reviewed Yes   Additional Pertinent History cleared for Tx session (spoke to nsg)   Response to Previous Treatment Patient with no complaints from previous session  Cognition   Overall Cognitive Status Impaired   Arousal/Participation Alert; Cooperative   Attention Attends with cues to redirect   Orientation Level Oriented to person;Oriented to place;Oriented to situation   Memory Decreased recall of recent events   Following Commands Follows one step commands without difficulty   Subjective   Subjective Alert; in bed; somewhat flat affect; pleasant and cooperative; noted to be occasionally shaky (reports hx of tardive dyskinesia); agreeable to try mobilization   Bed Mobility   Supine to Sit 5  Supervision   Additional items Assist x 1; Increased time required;Verbal cues   Sit to Supine 5  Supervision   Additional items Assist x 1; Increased time required;Verbal cues  (repositioned higher in bed w/ (A)x2)   Additional Comments Pillow placed for (R) LE support and blanket roll for (L) LE support; call bell w/in reach; bed alarm on;    Transfers   Sit to Stand 3  Moderate assistance  (2 trials)   Additional items Assist x 1;Assist x 2  ((A)x1 ->2)   Stand to Sit 3  Moderate assistance  (2 trials)   Additional items Assist x 1;Assist x 2;Verbal cues  ((A)x1 ->2)   Stand pivot Unable to assess   Ambulation/Elevation   Gait pattern Not appropriate; Not tested   Assistive Device Rolling walker   Balance   Static Sitting Fair +   Dynamic Sitting Fair  ((L) lateral seated scoots towards the [facility])   Static Standing Poor +   Dynamic Standing Poor   Activity Tolerance   Activity Tolerance Patient limited by fatigue   Nurse Made Aware spoke to Popeye viera RN   Exercises   Hip Abduction Supine;10 reps;AAROM; Bilateral  (2 sets)   Knee AROM Long Arc Quad Sitting;10 reps;AAROM; Bilateral;AROM  (AAROM (R) LE, AROM (L) LE; 2 sets)   Ankle Pumps Supine;10 reps;AAROM;AROM; Left  (2 sets)   Balance training  Standing balance/tolerance training 2 x 10 sec w/ rw   Assessment   Prognosis Fair   Problem List Decreased strength;Decreased endurance; Impaired balance;Decreased mobility; Decreased cognition;Obesity;Orthopedic restrictions   Assessment Pt cont to demonstrate mod functional mobility deficits requiring (A)x 1-2 for transfers trials at bedside while maintaining NWB on (R) LE; pt exhibits overall weakness and deconditioning w/ associated balance/standing tolerance deficits --> unable to progress to amb trials at this time; overall, cont to recommend rehab upon D/C when medically cleared; will follow  Barriers to Discharge Decreased caregiver support; Inaccessible home environment   Goals   Patient Goals to feel better   STG Expiration Date 05/21/21   PT Treatment Day 3   Plan   Treatment/Interventions Functional transfer training;LE strengthening/ROM; Therapeutic exercise; Endurance training;Cognitive reorientation; Bed mobility;Spoke to nursing;OT   Progress Progressing toward goals   PT Frequency Other (Comment)  (3-5x/wk)   Recommendation   PT Discharge Recommendation Post acute rehabilitation services   Equipment Recommended Wheelchair;Walker   Walker Package Recommended Wheeled walker   AM-PAC Basic Mobility Inpatient   Turning in Bed Without Bedrails 3   Lying on Back to Sitting on Edge of Flat Bed 3   Moving Bed to Chair 2   Standing Up From Chair 2   Walk in Room 1   Climb 3-5 Stairs 1   Basic Mobility Inpatient Raw Score 12   Basic Mobility Standardized Score 32 23     Ashley Pickard, PT

## 2021-05-18 ENCOUNTER — HOSPITAL ENCOUNTER (EMERGENCY)
Facility: HOSPITAL | Age: 56
Discharge: HOME | End: 2021-05-18
Attending: EMERGENCY MEDICINE
Payer: COMMERCIAL

## 2021-05-18 ENCOUNTER — APPOINTMENT (EMERGENCY)
Dept: RADIOLOGY | Facility: HOSPITAL | Age: 56
End: 2021-05-18
Payer: COMMERCIAL

## 2021-05-18 ENCOUNTER — TRANSITIONAL CARE MANAGEMENT (OUTPATIENT)
Dept: INTERNAL MEDICINE CLINIC | Facility: CLINIC | Age: 56
End: 2021-05-18

## 2021-05-18 VITALS
TEMPERATURE: 97.9 F | RESPIRATION RATE: 20 BRPM | OXYGEN SATURATION: 100 % | SYSTOLIC BLOOD PRESSURE: 159 MMHG | HEART RATE: 84 BPM | DIASTOLIC BLOOD PRESSURE: 99 MMHG

## 2021-05-18 DIAGNOSIS — R25.1 TREMOR: ICD-10-CM

## 2021-05-18 DIAGNOSIS — I10 HYPERTENSION, UNSPECIFIED TYPE: ICD-10-CM

## 2021-05-18 DIAGNOSIS — M54.16 LUMBAR RADICULOPATHY: ICD-10-CM

## 2021-05-18 DIAGNOSIS — F41.9 ANXIETY: ICD-10-CM

## 2021-05-18 DIAGNOSIS — N39.41 URGE INCONTINENCE OF URINE: ICD-10-CM

## 2021-05-18 DIAGNOSIS — S92.309A METATARSAL FRACTURE: Primary | ICD-10-CM

## 2021-05-18 DIAGNOSIS — G89.4 CHRONIC PAIN DISORDER: ICD-10-CM

## 2021-05-18 DIAGNOSIS — G24.01 TARDIVE DYSKINESIA: ICD-10-CM

## 2021-05-18 PROCEDURE — TCMNV: Performed by: INTERNAL MEDICINE

## 2021-05-18 PROCEDURE — 96372 THER/PROPH/DIAG INJ SC/IM: CPT

## 2021-05-18 PROCEDURE — 73630 X-RAY EXAM OF FOOT: CPT

## 2021-05-18 PROCEDURE — 99284 EMERGENCY DEPT VISIT MOD MDM: CPT | Performed by: EMERGENCY MEDICINE

## 2021-05-18 PROCEDURE — 99284 EMERGENCY DEPT VISIT MOD MDM: CPT

## 2021-05-18 RX ORDER — OXYCODONE HYDROCHLORIDE 5 MG/1
5 TABLET ORAL DAILY
Qty: 15 TABLET | Refills: 0 | Status: CANCELLED | OUTPATIENT
Start: 2021-05-18

## 2021-05-18 RX ORDER — KETOROLAC TROMETHAMINE 30 MG/ML
15 INJECTION, SOLUTION INTRAMUSCULAR; INTRAVENOUS ONCE
Status: COMPLETED | OUTPATIENT
Start: 2021-05-18 | End: 2021-05-18

## 2021-05-18 RX ORDER — CETIRIZINE HYDROCHLORIDE 10 MG/1
10 TABLET ORAL AS NEEDED
Status: CANCELLED | OUTPATIENT
Start: 2021-05-18

## 2021-05-18 RX ORDER — PREGABALIN 225 MG/1
225 CAPSULE ORAL EVERY 12 HOURS
Qty: 60 CAPSULE | Refills: 0 | Status: CANCELLED | OUTPATIENT
Start: 2021-05-18

## 2021-05-18 RX ORDER — PROPRANOLOL HYDROCHLORIDE 20 MG/1
20 TABLET ORAL 2 TIMES DAILY
Qty: 180 TABLET | Refills: 3 | Status: CANCELLED | OUTPATIENT
Start: 2021-05-18 | End: 2021-08-16

## 2021-05-18 RX ORDER — MORPHINE SULFATE 15 MG/1
15 TABLET, FILM COATED, EXTENDED RELEASE ORAL 2 TIMES DAILY
Qty: 30 TABLET | Refills: 0 | Status: CANCELLED | OUTPATIENT
Start: 2021-05-18

## 2021-05-18 RX ORDER — ACETAMINOPHEN 325 MG/1
975 TABLET ORAL ONCE
Status: COMPLETED | OUTPATIENT
Start: 2021-05-18 | End: 2021-05-18

## 2021-05-18 RX ORDER — HYDROXYZINE 50 MG/1
50 TABLET, FILM COATED ORAL
Qty: 90 TABLET | Refills: 0 | Status: CANCELLED | OUTPATIENT
Start: 2021-05-18

## 2021-05-18 RX ORDER — OXYBUTYNIN CHLORIDE 5 MG/1
5 TABLET ORAL 2 TIMES DAILY
Qty: 180 TABLET | Refills: 3 | Status: CANCELLED | OUTPATIENT
Start: 2021-05-18 | End: 2021-08-16

## 2021-05-18 RX ORDER — VALBENAZINE 80 MG/1
80 CAPSULE ORAL DAILY
Qty: 30 CAPSULE | Refills: 1 | Status: CANCELLED | OUTPATIENT
Start: 2021-05-18

## 2021-05-18 RX ORDER — HYDROXYZINE HYDROCHLORIDE 25 MG/1
50 TABLET, FILM COATED ORAL ONCE
Status: COMPLETED | OUTPATIENT
Start: 2021-05-18 | End: 2021-05-18

## 2021-05-18 RX ADMIN — HYDROXYZINE HYDROCHLORIDE 50 MG: 25 TABLET ORAL at 05:53

## 2021-05-18 RX ADMIN — ACETAMINOPHEN 975 MG: 325 TABLET, FILM COATED ORAL at 06:29

## 2021-05-18 RX ADMIN — KETOROLAC TROMETHAMINE 15 MG: 30 INJECTION, SOLUTION INTRAMUSCULAR; INTRAVENOUS at 09:41

## 2021-05-18 NOTE — ED NOTES
Pt continues to ring call bell requesting an update  Pt told each time she rang that the results of her x ray were unchanged per Dr Itzel Mckenzie made aware three times of pts request for update        Nessa Charles RN  05/18/21 1288

## 2021-05-18 NOTE — ED NOTES
Pt rang call bell  Requested to go to bathroom  Put on bedpan at this time       Christina Sampson RN  05/18/21 [id number]

## 2021-05-18 NOTE — ED NOTES
Patient unable to walk with crutches  We tried multiple education attempts with 2 different people         Alexandra Gunn  05/18/21 6716

## 2021-05-18 NOTE — TELEPHONE ENCOUNTER
Needs refills asap, left [facility] and they would not give her all of her meds

## 2021-05-18 NOTE — ED PROVIDER NOTES
History  Chief Complaint   Patient presents with    Pain     pt brought in by EMS from [facility] after being discharged from hospital yesterday for foot fx  Pt states [facility] is not helping her and she has pain in her leg  HPI  70-year-old female who was recently hospitalized for intact Lisfranc ligament and CALE and was discharged to [facility] presents to the ED after calling 911 from [facility]  Per EMS, patient called [phone] and demanded that EMS come pick her up so she could go to another nursing home  Pt agrees with EMS reports and states its what she had to do to get out of that place  Patient states, she is not happy at [facility] because they forget about her  She would like to go to another nursing home where they check on her more  She also complains of increased right foot pain since discharge  Patient's only physical complaint at this time is her right foot  She states she has had pain in it for the last few weeks but it has seemed to worsen and now it is shooting pain  Patient denies headache, visual changes, dizziness, fevers, chills, chest pain, palpitations, abdominal pain, diarrhea  Prior to Admission Medications   Prescriptions Last Dose Informant Patient Reported? Taking?    [facility] Vitamin C 500 MG tablet   No No   Sig: TAKE 1 TABLET BY MOUTH TWICE A DAY   DULoxetine (CYMBALTA) 60 mg delayed release capsule   No No   Sig: TAKE 1 CAPSULE BY MOUTH EVERY DAY   Diapers & Supplies (Huggies Pull-Ups) MISC   No No   Sig: Use 3 (three) times a day   LORazepam (ATIVAN) 0 5 mg tablet   No No   Sig: Take 1 tablet (0 5 mg total) by mouth every 8 (eight) hours as needed for anxiety for up to 10 days Hold if sedated   Patient not taking: Reported on 11/27/2020   Mouthwashes (Biotene Dry Mouth) LIQD   Yes No   Sig: Apply 5 mL to the mouth or throat daily   Multiple Vitamins-Minerals (multivitamin with minerals) tablet  Outside Facility (Specify) No No   Sig: Take 1 tablet by mouth daily Polyethylene Glycol 3350 (MIRALAX PO)  Outside Facility (Specify) Yes No   Sig: Take by mouth   QUEtiapine (SEROquel) 300 mg tablet  Self Yes No   Sig: Take 300 mg by mouth daily at bedtime   Respiratory Therapy Supplies (Nebulizer) YUDY   No No   Sig: Use as needed (Shortness of breath)   Patient not taking: Reported on 4/20/2021   SIMETHICONE PO  Outside Facility (Specify) Yes No   Sig: Take by mouth as needed    Sennosides (SENEXON PO)  Outside Facility (Specify) Yes No   Sig: Take by mouth   Valbenazine Tosylate (Ingrezza) 80 MG CAPS   No No   Sig: Take 80 mg by mouth daily   acetaminophen (TYLENOL) 325 mg tablet   No No   Sig: Take 2 tablets (650 mg total) by mouth every 8 (eight) hours as needed for mild pain   amLODIPine (NORVASC) 5 mg tablet  Outside Facility (Specify) No No   Sig: Take 1 tablet (5 mg total) by mouth daily   bisacodyl (DULCOLAX) 5 mg EC tablet  Outside Facility (Specify) Yes No   Sig: Take 5 mg by mouth daily as needed for constipation   busPIRone (BUSPAR) 15 mg tablet   No No   Sig: Take 1 tablet (15 mg total) by mouth 3 (three) times a day   cetirizine (ZyrTEC) 10 mg tablet  Outside Facility (Specify) Yes No   Sig: Take 10 mg by mouth as needed    cholecalciferol (VITAMIN D3) 1,000 units tablet  Outside Facility (Specify) No No   Sig: Take 1 tablet (1,000 Units total) by mouth daily   diclofenac sodium (VOLTAREN) 1 %  Outside Facility (Specify) No No   Sig: APPLY 4 G TOPICALLY 4 (FOUR) TIMES A DAY   Patient taking differently: Apply 4 g topically 2 (two) times a day    ferrous sulfate 324 (65 Fe) mg   No No   Sig: TAKE 1 TABLET (324 MG TOTAL) BY MOUTH 2 (TWO) TIMES A DAY BEFORE MEALS   folic acid (FOLVITE) 1 mg tablet   No No   Sig: TAKE 1 TABLET BY MOUTH EVERY DAY   hydrOXYzine HCL (ATARAX) 50 mg tablet   No No   Sig: Take 1 tablet (50 mg total) by mouth daily at bedtime as needed for anxiety (insomnia)   ipratropium-albuterol (DUO-NEB) 0 5-2 5 mg/3 mL nebulizer solution   No No   Sig: Take 1 vial (3 mL total) by nebulization every 6 (six) hours as needed for wheezing or shortness of breath   Patient not taking: Reported on 2021   lidocaine (LMX) 4 % cream  Outside Facility (Specify) No No   Sig: Apply topically as needed for mild pain   Patient not taking: Reported on 2021   morphine (MS CONTIN) 15 mg 12 hr tablet   No No   Sig: Take 1 tablet (15 mg total) by mouth 2 (two) times a day Hold if sedatedMax Daily Amount: 30 mg   naloxone (NARCAN) 4 mg/0 1 mL nasal spray   No No   Sig: Administer 1 spray into a nostril  If no response after 2-3 minutes, give another dose in the other nostril using a new spray     nortriptyline (PAMELOR) 10 mg capsule   No No   Sig: Take 1 capsule (10 mg total) by mouth daily at bedtime   omeprazole (PriLOSEC) 20 mg delayed release capsule   No No   Sig: Take 1 capsule (20 mg total) by mouth daily   ondansetron (ZOFRAN-ODT) 4 mg disintegrating tablet  Outside Facility (Specify) No No   Sig: Take 1 tablet (4 mg total) by mouth every 6 (six) hours as needed for nausea or vomiting   Patient not taking: Reported on 2021   oxyCODONE (ROXICODONE) 5 mg immediate release tablet   No No   Sig: Take 1 tablet (5 mg total) by mouth dailyMax Daily Amount: 5 mg   oxybutynin (DITROPAN) 5 mg tablet   No No   Sig: Take 1 tablet (5 mg total) by mouth 2 (two) times a day   potassium chloride (MICRO-K) 10 MEQ CR capsule  Self Yes No   Sig: Take 10 mEq by mouth 2 (two) times a day Two tabs, two times daily   prazosin (MINIPRESS) 2 mg capsule  Outside Facility (Specify) No No   Sig: TAKE 1 CAPSULE BY MOUTH EVERY DAY   pregabalin (LYRICA) 225 MG capsule   No No   Sig: TAKE 1 CAPSULE (225 MG TOTAL) BY MOUTH EVERY 12 (TWELVE) HOURS   propranolol (INDERAL) 20 mg tablet   No No   Sig: Take 1 tablet (20 mg total) by mouth 2 (two) times a day   sodium chloride (OCEAN) 0 65 % nasal spray   Yes No   Si sprays into each nostril as needed   traZODone (DESYREL) 100 mg tablet  Self Yes No   Sig: Take 100 mg by mouth daily at bedtime 2 tablets daily at bedtime   triamcinolone (KENALOG) 0 025 % cream  Outside Facility (Specify) No No   Sig: Apply topically 2 (two) times a day   Patient not taking: Reported on 2021      Facility-Administered Medications: None       Past Medical History:   Diagnosis Date    Acid reflux     Anxiety     RESOLVED: [id number]    Arthritis     Bipolar 2 disorder (HCC)     FOLLOWS WITH PSYCHIATRIST  CONTINUE LAMOTRIGINE; RESOLVED: [id number]    Depression     Familial tremor     both hands    Fibromyalgia     LAST ASSESSED: [id number]    Hearing aid worn     left ear    [place] (hard of hearing)     left ear    Hypertension     Left-sided weakness     Lower back pain     Memory loss of unknown cause     long and short term    Migraine     Obesity     Obesity, Class II, BMI 35-39 9     Overactive bladder     Panic attack     Post traumatic stress disorder     Seasonal allergies     Stroke [facility])     questionable stroke 2009    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    CONT ASA 81 MG DAILY; RESOLVED: [id number]    Urinary incontinence     Wears dentures     partial lower / full upper    Wears glasses        Past Surgical History:   Procedure Laterality Date    BACK SURGERY       SECTION      COLONOSCOPY      RESOLVED: [id number]    EAR SURGERY      EGD      HYSTERECTOMY  2004    MYRINGOTOMY W/ TUBES Left     NECK SURGERY  2019    CA CYSTOURETHROSCOPY N/A 2016    Procedure: CYSTOSCOPY, BOTOX INJECTION;  Surgeon: Wayne Sanchez MD;  Location: [facility];  Service: Gynecology    CA IMPLANT SPINAL NEUROSTIM/ Right 2/10/2021    Procedure: REPLACEMENT IMPLANTABLE PULSE GENERATOR DORSAL SPINAL COLUMN STIMULATOR, RIGHT;  Surgeon: Erna French MD;  Location: [facility];  Service: Neurosurgery    CA PERCUT IMPLNT NEUROELECT,EPIDURAL Right 2020    Procedure: INSERTION THORACIC DORSAL COLUMN SPINAL CORD STIMULATOR PERCUTANEOUS W IMPLANTABLE PULSE GENERATOR, RIGHT;  Surgeon: Bri Leon MD;  Location:  MAIN OR;  Service: Neurosurgery   [facility]    UPPER GASTROINTESTINAL ENDOSCOPY  09/2020       Family History   Problem Relation Age of Onset    Colon cancer Mother     Alzheimer's disease Father     Stroke Father     Colon cancer Brother     Bipolar disorder Brother     Breast cancer Maternal Aunt     Colon cancer Maternal Aunt     Heart disease Other     Diabetes Other     Hypertension Other     Seizures Son     Depression Paternal Grandfather     No Known Problems Sister     No Known Problems Brother     Thyroid disease Neg Hx      I have reviewed and agree with the history as documented  E-Cigarette/Vaping    E-Cigarette Use Never User      E-Cigarette/Vaping Substances    Nicotine No     THC No     CBD No     Flavoring No     Other No     Unknown No      Social History     Tobacco Use    Smoking status: Never Smoker    Smokeless tobacco: Never Used   Substance Use Topics    Alcohol use: Not Currently     Comment: 2 x year; being a social drinker as per all scripts     Drug use: No        Review of Systems   All other systems reviewed and are negative        Physical Exam  ED Triage Vitals   Temperature Pulse Respirations Blood Pressure SpO2   05/18/21 0507 05/18/21 0507 05/18/21 0507 05/18/21 0656 05/18/21 0507   97 9 °F (36 6 °C) 88 18 159/99 93 %      Temp Source Heart Rate Source Patient Position - Orthostatic VS BP Location FiO2 (%)   05/18/21 0507 05/18/21 0507 05/18/21 0507 05/18/21 0507 --   Oral Monitor Lying Left arm       Pain Score       05/18/21 0629       Worst Possible Pain             Orthostatic Vital Signs  Vitals:    05/18/21 0507 05/18/21 0656   BP:  159/99   Pulse: 88 84   Patient Position - Orthostatic VS: Lying        Physical Exam   Constitutional:  Well developed, agitated  HEENT:  Conjunctiva normal  Oropharynx moist  Respiratory:  No respiratory distress  Cardiovascular:  Normal rate  GI:  Soft, nondistended, nontender  :  No costovertebral angle tenderness   Musculoskeletal:  Right foot in hard cast  Integument:  Well hydrated, no rash   Lymphatic:  No lymphadenopathy noted   Neurologic:  Alert & oriented x 3, normal motor function, no focal deficits noted   Psychiatric:  Agitated, anxious, yelling in room    ED Medications  Medications   hydrOXYzine HCL (ATARAX) tablet 50 mg (50 mg Oral Given 5/18/21 7156)   acetaminophen (TYLENOL) tablet 975 mg (975 mg Oral Given 5/18/21 5158)   ketorolac (TORADOL) injection 15 mg (15 mg Intramuscular Given 5/18/21 8893)       Diagnostic Studies  Results Reviewed     None                 XR foot 3+ views RIGHT   Final Result by Luis Quesada MD (05/18 5063)      Fractures at the bases of the 2nd through 4th metatarsals  No obvious change in alignment since prior study            Workstation performed: [id number]               Procedures  Procedures      ED Course  ED Course as of May 20 1633   Tue May 18, 2021   0536 [ ]   F/U foot XR and case management consult                                          MDM  Number of Diagnoses or Management Options  Diagnosis management comments: 39-year-old female who was recently hospitalized for intact Lisfranc ligament and CALE and was discharged to [facility] [place] presents to the ED after calling 911 from [facility] [place]  Per EMS, patient called [phone] and demanded that EMS come pick her up so she could go to another nursing home  Will obtain x-ray of right foot any ER  XR showed no change from previous XR  Will give atarax for anxiety per patient request   Case management consult regarding SNF placement    Patient signed out next physician with pending case management consult       Amount and/or Complexity of Data Reviewed  Tests in the radiology section of CPT®: ordered and reviewed  Review and summarize past medical records: yes  Discuss the patient with other providers: yes    Risk of Complications, Morbidity, and/or Mortality  Presenting problems: low  Diagnostic procedures: low  Management options: moderate    Patient Progress  Patient progress: improved      Disposition  Final diagnoses:   None     ED Disposition     ED Disposition Condition Date/Time Comment    Left from Room after Provider Exam  Tue May 18, 2021  9:53 AM       Follow-up Information    None         Discharge Medication List as of 5/18/2021  9:53 AM      CONTINUE these medications which have NOT CHANGED    Details   acetaminophen (TYLENOL) 325 mg tablet Take 2 tablets (650 mg total) by mouth every 8 (eight) hours as needed for mild pain, Starting Tue 3/9/2021, Normal      amLODIPine (NORVASC) 5 mg tablet Take 1 tablet (5 mg total) by mouth daily, Starting Wed 3/25/2020, Until Tue 4/20/2021, Normal      bisacodyl (DULCOLAX) 5 mg EC tablet Take 5 mg by mouth daily as needed for constipation, Historical Med      busPIRone (BUSPAR) 15 mg tablet Take 1 tablet (15 mg total) by mouth 3 (three) times a day, Starting Wed 3/17/2021, Until Tue 4/20/2021, Normal      cetirizine (ZyrTEC) 10 mg tablet Take 10 mg by mouth as needed , Starting Thu 11/19/2020, Historical Med      cholecalciferol (VITAMIN D3) 1,000 units tablet Take 1 tablet (1,000 Units total) by mouth daily, Starting Tue 8/4/2020, Until Wed 2/24/2021, Normal      CVS Vitamin C 500 MG tablet TAKE 1 TABLET BY MOUTH TWICE A DAY, Normal      Diapers & Supplies (Huggies Pull-Ups) MISC Use 3 (three) times a day, Starting Tue 4/20/2021, Normal      diclofenac sodium (VOLTAREN) 1 % APPLY 4 G TOPICALLY 4 (FOUR) TIMES A DAY, Starting Wed 8/5/2020, Normal      DULoxetine (CYMBALTA) 60 mg delayed release capsule TAKE 1 CAPSULE BY MOUTH EVERY DAY, Normal      ferrous sulfate 324 (65 Fe) mg TAKE 1 TABLET (324 MG TOTAL) BY MOUTH 2 (TWO) TIMES A DAY BEFORE MEALS, Starting Tue 3/47/8944, Normal      folic acid (FOLVITE) 1 mg tablet TAKE 1 TABLET BY MOUTH EVERY DAY, Normal      hydrOXYzine HCL (ATARAX) 50 mg tablet Take 1 tablet (50 mg total) by mouth daily at bedtime as needed for anxiety (insomnia), Starting Wed 3/17/2021, Normal      ipratropium-albuterol (DUO-NEB) 0 5-2 5 mg/3 mL nebulizer solution Take 1 vial (3 mL total) by nebulization every 6 (six) hours as needed for wheezing or shortness of breath, Starting Mon 3/29/2021, Print      lidocaine (LMX) 4 % cream Apply topically as needed for mild pain, Starting Thu 7/16/2020, Normal      LORazepam (ATIVAN) 0 5 mg tablet Take 1 tablet (0 5 mg total) by mouth every 8 (eight) hours as needed for anxiety for up to 10 days Hold if sedated, Starting Wed 9/9/2020, Until Sat 9/19/2020, Print      morphine (MS CONTIN) 15 mg 12 hr tablet Take 1 tablet (15 mg total) by mouth 2 (two) times a day Hold if sedatedMax Daily Amount: 30 mg, Starting Tue 4/20/2021, Normal      Mouthwashes (Biotene Dry Mouth) LIQD Apply 5 mL to the mouth or throat daily, Historical Med      Multiple Vitamins-Minerals (multivitamin with minerals) tablet Take 1 tablet by mouth daily, Starting Tue 8/18/2020, Normal      naloxone (NARCAN) 4 mg/0 1 mL nasal spray Administer 1 spray into a nostril   If no response after 2-3 minutes, give another dose in the other nostril using a new spray , Normal      nortriptyline (PAMELOR) 10 mg capsule Take 1 capsule (10 mg total) by mouth daily at bedtime, Starting Fri 4/16/2021, Normal      omeprazole (PriLOSEC) 20 mg delayed release capsule Take 1 capsule (20 mg total) by mouth daily, Starting Wed 3/17/2021, Until Tue 6/15/2021, Normal      ondansetron (ZOFRAN-ODT) 4 mg disintegrating tablet Take 1 tablet (4 mg total) by mouth every 6 (six) hours as needed for nausea or vomiting, Starting Thu 9/17/2020, Print      oxybutynin (DITROPAN) 5 mg tablet Take 1 tablet (5 mg total) by mouth 2 (two) times a day, Starting Wed 3/17/2021, Until Tue 6/15/2021, Normal      oxyCODONE (ROXICODONE) 5 mg immediate release tablet Take 1 tablet (5 mg total) by mouth dailyMax Daily Amount: 5 mg, Starting Tue 4/20/2021, Normal      Polyethylene Glycol 3350 (MIRALAX PO) Take by mouth, Historical Med      potassium chloride (MICRO-K) 10 MEQ CR capsule Take 10 mEq by mouth 2 (two) times a day Two tabs, two times daily, Historical Med      prazosin (MINIPRESS) 2 mg capsule TAKE 1 CAPSULE BY MOUTH EVERY DAY, Normal      pregabalin (LYRICA) 225 MG capsule TAKE 1 CAPSULE (225 MG TOTAL) BY MOUTH EVERY 12 (TWELVE) HOURS, Starting Thu 4/29/2021, Normal      propranolol (INDERAL) 20 mg tablet Take 1 tablet (20 mg total) by mouth 2 (two) times a day, Starting Wed 3/17/2021, Until Tue 6/15/2021, Normal      QUEtiapine (SEROquel) 300 mg tablet Take 300 mg by mouth daily at bedtime, Historical Med      Respiratory Therapy Supplies (Nebulizer) YUDY Use as needed (Shortness of breath), Starting Mon 3/29/2021, Print      Sennosides (SENEXON PO) Take by mouth, Historical Med      SIMETHICONE PO Take by mouth as needed , Historical Med      sodium chloride (OCEAN) 0 65 % nasal spray 2 sprays into each nostril as needed, Historical Med      traZODone (DESYREL) 100 mg tablet Take 100 mg by mouth daily at bedtime 2 tablets daily at bedtime, Historical Med      triamcinolone (KENALOG) 0 025 % cream Apply topically 2 (two) times a day, Starting Thu 7/16/2020, Normal      Valbenazine Tosylate (Ingrezza) 80 MG CAPS Take 80 mg by mouth daily, Starting Wed 3/17/2021, Normal           No discharge procedures on file  PDMP Review       Value Time User    PDMP Reviewed  Yes 9/5/2020 12:53 PM Vinod Barger MD           ED Provider  Attending physically available and evaluated Samantha QUINONEZ Magaly Saint I managed the patient along with the ED Attending      Electronically Signed by         Xiang Erazo MD  05/20/21 8353

## 2021-05-18 NOTE — UTILIZATION REVIEW
Continued Stay Review    Date: 05/07/2021                         Current Patient Class: Inpatient  Current Level of Care: Med/Surg    HPI:55 y o  female initially admitted on 05/02/2021   Ambulatory dysfunction  Assessment/Plan:  Metatarsal fracture - Per Podiatry no surgical intervention  NWB to RLE  Analgesia scheduled and PRN  Reports nausea, check CMP in AM, continue zofran PRN, Continue to monitor  Vital Signs: /76   Pulse 72   Temp 97 8 °F (36 6 °C)   Resp 18   SpO2 97%       Pertinent Labs/Diagnostic Results:   Results from last 7 days   Lab Units 05/17/21  1245   SARS-COV-2  Negative                       Medications:   Scheduled Medications:  No current facility-administered medications for this encounter  Continuous IV Infusions:  No current facility-administered medications for this encounter  PRN Meds:  calcium carbonate, 500 mg, Oral, Daily PRN  hydrOXYzine HCL, 25 mg, Oral, Q6H PRN  ibuprofen, 400 mg, Oral, Q6H PRN  ondansetron, 4 mg, Intravenous, Q6H PRN   X 1 dose 5/7  oxyCODONE, 5 mg, Oral, Daily PRN  sodium chloride, 20 mL/hr, Intravenous, Continuous PRN        Discharge Plan: D    Network Utilization Review Department  ATTENTION: Please call with any questions or concerns to [phone] and carefully listen to the prompts so that you are directed to the right person  All voicemails are confidential   Amaya Shankar all requests for admission clinical reviews, approved or denied determinations and any other requests to dedicated fax number below belonging to the campus where the patient is receiving treatment   List of dedicated fax numbers for the Facilities:  [address] DENIALS (Administrative/Medical Necessity) [phone]   [address] (Maternity/NICU/Pediatrics) [address] [facility],[address] 40 [phone] 8049 [facility] [place][note 2] [phone] Kiran Ward [facility] [address] [address] [place] [address] Lorenza Baker [address] [facility] [phone]         Continued Stay Review    Date:   5-12-21                           Current Patient Class: inpatient Current Level of Care: med surg    HPI:55 y o  female initially admitted on 5-2-21 for  Ambulatory dysfunction  Metatarsal fractures - non displaced on RLE  Left hip pain snehal to trochanteric bursitis  Opoid dependence  Assessment/Plan:   Continue PT/OT for treatment of functional deficits with recommendation for inpatient rehab  Behavioral health consult completed  5-1 for evaluation of anxiety and clearance for SNF placement  Referrals completed  County approval pending  Behavioral health consult   Plan:   Continue medical management  Continue current psychotropic medication  Patient is psychiatrically cleared to go to SNF  Discussed with primary team  No other intervention at this time    Vital Signs:    Vitals:    05/16/21 0836 05/16/21 1626 05/17/21 0704 05/17/21 1500   BP:  120/79 124/77 124/76   Pulse: 79 66 55 72   Resp:  16 18 18   Temp:  97 8 °F (36 6 °C)     TempSrc:       SpO2: 97% 96% 96% 97%     Pertinent Labs/Diagnostic Results:   Results from last 7 days   Lab Units 05/17/21  1245   SARS-COV-2  Negative                     Results from last 7 days   Lab Units 05/16/21  0034   POC GLUCOSE mg/dl 98           Medications:     No current facility-administered medications for this encounter  Continuous IV Infusions:  No current facility-administered medications for this encounter       PRN Meds:  No current facility-administered medications for this encounter  Discharge Plan: to be determined     Network Utilization Review Department  ATTENTION: Please call with any questions or concerns to [phone] and carefully listen to the prompts so that you are directed to the right person  All voicemails are confidential   Marcelae Pair all requests for admission clinical reviews, approved or denied determinations and any other requests to dedicated fax number below belonging to the campus where the patient is receiving treatment  List of dedicated fax numbers for the Facilities:  [address] DENIALS (Administrative/Medical Necessity) [phone]   [address] (Maternity/NICU/Pediatrics) [phone]   [address] [place] [address] [facility] [address] [address] [facility] [address] [facility] [place] [address] [phone]     Notification of Discharge   This is a Notification of Discharge from our facility [address]  Please be advised that this patient has been discharge from our facility  Below you will find the admission and discharge date and time including the patients disposition  UTILIZATION REVIEW CONTACT:  Pernell Kayser  Utilization   Network Utilization Review Department  Phone: [phone] x carefully listen to the prompts  All voicemails are confidential   Email: [email or web address]     PHYSICIAN ADVISORY SERVICES:  FOR DHTY-MJ-KDIV REVIEW - MEDICAL NECESSITY DENIAL  Phone: [phone]  Fax: [phone]  Email: [email or web address]     PRESENTATION DATE: 5/2/2021  1:35 PM  Richie Cuevas ADMISSION DATE:   INPATIENT ADMISSION DATE: 5/2/21 1848   DISCHARGE DATE: 5/17/2021  4:58 PM  DISPOSITION: Non [facility] SNF/TCU/SNU Non [facility] SNF/TCU/SNU      IMPORTANT INFORMATION:  Send all requests for admission clinical reviews, approved or denied determinations and any other requests to dedicated fax number below belonging to the campus where the patient is receiving treatment   List of dedicated fax numbers:  [address] DENIALS (Administrative/Medical Necessity) [phone]   [address]  (Maternity/NICU/Pediatrics) [phone]   Olivia Starks [phone]   [facility] [phone]   Swathi Randolph [phone]   Karenann Deal [address] [phone]   [facility]  [phone]   [address] [place] [address]  2401 [facility] 1000 [address] [facility] [phone]

## 2021-05-18 NOTE — ED NOTES
Patient repeatedly yelling "nurse nurse nurse" from room at this time     Beata Gil RN  05/18/21 0654

## 2021-05-18 NOTE — ED NOTES
Patient continues to ring to see the doctor  Dr Griselda Brennan is aware        Mikayla Benoit, RN  05/18/21 8895

## 2021-05-18 NOTE — ED NOTES
Patient rang call bell  RN respond to call bell  Patient inquired if xray resulted at this time  RN explained to patient could take up to two hours for imaging to be resulted  Patient responded "ok lemme get some pain pills or something " ER doctor made aware        Radha Wood RN  05/18/21 6797

## 2021-05-18 NOTE — ED NOTES
Patient requesting for crutches to be given to her and to be wheeled out to the front because she is getting an [employer]  Patient assisted with putting pants on  Wheelchair provided along with crutches to take home  Dr Angelina Gutierrez aware of patient leaving        Luan Garduno RN  05/18/21 3155

## 2021-05-18 NOTE — UTILIZATION REVIEW
Notification of Discharge   This is a Notification of Discharge from our facility [address]  Please be advised that this patient has been discharge from our facility  Below you will find the admission and discharge date and time including the patients disposition  UTILIZATION REVIEW CONTACT:  Avis Lux  Utilization   Network Utilization Review Department  Phone: [phone] x carefully listen to the prompts  All voicemails are confidential   Email: [email or web address]     PHYSICIAN ADVISORY SERVICES:  FOR [id number] REVIEW - MEDICAL NECESSITY DENIAL  Phone: [phone]  Fax: [phone]  Email: [email or web address]     PRESENTATION DATE: 5/2/2021  1:35 PM  Kate Becerra ADMISSION DATE:   INPATIENT ADMISSION DATE: 5/2/21 1848   DISCHARGE DATE: 5/17/2021  4:58 PM  DISPOSITION: Non [facility] SN[facility] Non [facility]      IMPORTANT INFORMATION:  Send all requests for admission clinical reviews, approved or denied determinations and any other requests to dedicated fax number below belonging to the campus where the patient is receiving treatment   List of dedicated fax numbers:  [address] [place] [address] DENIALS (Administrative/Medical Necessity) [phone]   [address]  (Maternity/NICU/Pediatrics) [phone]   Adelita Johnson [phone]   Perry Patterson [phone]   [facility] [phone]   Paradise Garcia [address] [phone]   [facility]  [phone]   [address] [facility]  [address] [address] [facility] [phone]

## 2021-05-18 NOTE — ED NOTES
Spoke with son Dhaval Burgos, he is aware patient would like to return home to him  Son is willing to get Polo Calvo to get mother to his house  Son aware that we need to attempt to have patient to walk with crutches prior to discharge       Escobar Hager RN  05/18/21 9069
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
## 2021-05-18 NOTE — ED NOTES
Patient asked RN if doctor can by pass reading xrays because she cant take it       Letty Levy, MARIA  05/18/21 7323

## 2021-05-18 NOTE — TELEPHONE ENCOUNTER
PDMP checked, last refill given on 5/17/2021 by Dr Destinee Pitts  I called and spoke to supervisor BOD[place] at [facility] ([phone])  and she stated that all of her personal meds are there and she spoke to patients daughter last night and she stated that she would pick them up but has not come yet  She said there are 15 pills of Morphine and the Oxycodone bottle is empty  (that was filled 4/20/21 for a 15 day supply by our office)  There are also some other medications there  She said they are allowed to come and  all of her meds that are there as these are her personal meds that came with her from the hospital  She advised to have them come to the facility and ask for the supervisor BODØ and she will bring them out to them  I called patients son Patti Hampton and made him aware and advised that all medications will be further discussed this week at her appt   No further questions at this time    Discussed with Dr Adelita Lorenzana as well

## 2021-05-18 NOTE — TELEPHONE ENCOUNTER
Dr Ryan Zhou, you can disregard and decline all of these refills  I spoke with [facility] and she is able to go  all of her meds that are there   Her son was made aware

## 2021-05-18 NOTE — TELEPHONE ENCOUNTER
Form taken by NAVA Morris  She stated she will scan into media and take care of the rest of process  Principal Discharge DX:	Seizures

## 2021-05-18 NOTE — ED ATTENDING ATTESTATION
5/18/2021  IMicky MD, saw and evaluated the patient  I have discussed the patient with the resident/non-physician practitioner and agree with the resident's/non-physician practitioner's findings, Plan of Care, and MDM as documented in the resident's/non-physician practitioner's note, except where noted  All available labs and Radiology studies were reviewed  I was present for key portions of any procedure(s) performed by the resident/non-physician practitioner and I was immediately available to provide assistance  At this point I agree with the current assessment done in the Emergency Department  I have conducted an independent evaluation of this patient a history and physical is as follows:    ED Course     Emergency Department Note- Samantha Guajardo 60 y o  female MRN: [id number]    Unit/Bed#: ED 26 Encounter: [id number]    Alessandra Springer is a 60 y o  female who presents with   Chief Complaint   Patient presents with    Pain     pt brought in by EMS from [facility] after being discharged from hospital yesterday for foot fx  Pt states [facility] care is not helping her and she has pain in her leg  History of Present Illness   HPI:  Samantha Guajardo is a 60 y o  female who presents for evaluation of:  Patient is unhappy with [facility] where she is staying for rehabilitation after right foot metatarsal fractures  Patient feels that she is being mistreated at  rehabilitation facilitation ans she is getting very anxious  Review of Systems   Constitutional: Negative for chills and fever  HENT: Negative for congestion and rhinorrhea  Neurological: Negative for light-headedness and headaches  Psychiatric/Behavioral: Positive for dysphoric mood  The patient is nervous/anxious  All other systems reviewed and are negative        Historical Information   Past Medical History:   Diagnosis Date    Acid reflux     Anxiety     RESOLVED: [id number]    Arthritis     Bipolar 2 disorder ([address] )     FOLLOWS WITH PSYCHIATRIST  CONTINUE LAMOTRIGINE; RESOLVED: [id number]    Depression     Familial tremor     both hands    Fibromyalgia     LAST ASSESSED: [id number]    Hearing aid worn     left ear    [place] (hard of hearing)     left ear    Hypertension     Left-sided weakness     Lower back pain     Memory loss of unknown cause     long and short term    Migraine     Obesity     Obesity, Class II, BMI 35-39 9     Overactive bladder     Panic attack     Post traumatic stress disorder     Seasonal allergies     Stroke [facility])     questionable stroke 2009    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    CONT ASA 81 MG DAILY; RESOLVED: [id number]    Urinary incontinence     Wears dentures     partial lower / full upper    Wears glasses      Past Surgical History:   Procedure Laterality Date    BACK SURGERY       SECTION      COLONOSCOPY      RESOLVED: [id number]    EAR SURGERY      EGD      HYSTERECTOMY      MYRINGOTOMY W/ TUBES Left     NECK SURGERY  2019    WV CYSTOURETHROSCOPY N/A 2016    Procedure: CYSTOSCOPY, BOTOX INJECTION;  Surgeon: Bob More MD;  Location: [facility];  Service: Gynecology    WV IMPLANT SPINAL NEUROSTIM/ Right 2/10/2021    Procedure: REPLACEMENT IMPLANTABLE PULSE GENERATOR DORSAL SPINAL COLUMN STIMULATOR, RIGHT;  Surgeon: Lynn Ochoa MD;  Location:  MAIN OR;  Service: Neurosurgery    WV PERCUT IMPLNT Cathlene Grams Right 2020    Procedure: INSERTION THORACIC DORSAL COLUMN SPINAL CORD STIMULATOR PERCUTANEOUS W IMPLANTABLE PULSE GENERATOR, RIGHT;  Surgeon: Lynn Ochoa MD;  Location:  MAIN OR;  Service: Neurosurgery    [facility]    UPPER GASTROINTESTINAL ENDOSCOPY  2020     Social History   Social History     Substance and Sexual Activity   Alcohol Use Not Currently    Comment: 2 x year; being a social drinker as per all scripts      Social History Substance and Sexual Activity   Drug Use No     Social History     Tobacco Use   Smoking Status Never Smoker   Smokeless Tobacco Never Used     Family History: non-contributory    Meds/Allergies   all medications and allergies reviewed  No Known Allergies    Objective   First Vitals:   Pulse: 88 (21 0507)  Temperature: 97 9 °F (36 6 °C) (21 0507)  Temp Source: Oral (21 0507)  Respirations: 18 (21 0507)  SpO2: 93 % (21 0507)    Current Vitals:   Pulse: 88 (21 0507)  Temperature: 97 9 °F (36 6 °C) (21 0507)  Temp Source: Oral (21 0507)  Respirations: 18 (21 050)  SpO2: 93 % (21 0507)    No intake or output data in the 24 hours ending 21 0541    Invasive Devices     None                 Physical Exam  Vitals signs and nursing note reviewed  Constitutional:       Appearance: Normal appearance  HENT:      Head: Normocephalic and atraumatic  Right Ear: External ear normal       Left Ear: External ear normal       Nose: Nose normal       Mouth/Throat:      Mouth: Mucous membranes are moist       Pharynx: Oropharynx is clear  Neck:      Musculoskeletal: Normal range of motion and neck supple  Pulmonary:      Effort: Pulmonary effort is normal  No respiratory distress  Musculoskeletal:         General: Signs of injury present  Comments: R foot and lower extremity splint   Skin:     General: Skin is warm and dry  Capillary Refill: Capillary refill takes less than 2 seconds  Neurological:      General: No focal deficit present  Mental Status: She is alert and oriented to person, place, and time  Psychiatric:         Attention and Perception: Attention normal          Mood and Affect: Mood is anxious  Affect is angry  Behavior: Behavior is agitated  Medical Decision Makin  S/P R foot metatarsal fractures  2  Agitation about care at rehabilitation facility       Recent Results (from the past 36 hour(s)) NOVEL CORONAVIRUS (COVID-19), PCR [facility]    Collection Time: 05/17/21 12:45 PM    Specimen: Nose; Nares   Result Value Ref Range    SARS-CoV-2 Negative Negative     XR foot 3+ views RIGHT    (Results Pending)         Portions of the record may have been created with voice recognition software  Occasional wrong word or "sound a like" substitutions may have occurred due to the inherent limitations of voice recognition software  Read the chart carefully and recognize, using context, where substitutions have occurred          Critical Care Time  Procedures

## 2021-05-18 NOTE — ED NOTES
Patient rang call angel luis SIFUENTES responded at this time   Patient states "saw doctor walking past my door, can he put pain meds in, im just so miserable"      Elayne Nunez RN  05/18/21 1231
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
## 2021-05-19 ENCOUNTER — PATIENT OUTREACH (OUTPATIENT)
Dept: INTERNAL MEDICINE CLINIC | Facility: CLINIC | Age: 56
End: 2021-05-19
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
## 2021-05-21 ENCOUNTER — PATIENT OUTREACH (OUTPATIENT)
Dept: INTERNAL MEDICINE CLINIC | Facility: CLINIC | Age: 56
End: 2021-05-21

## 2021-05-21 ENCOUNTER — TELEPHONE (OUTPATIENT)
Dept: INTERNAL MEDICINE CLINIC | Facility: CLINIC | Age: 56
End: 2021-05-21

## 2021-05-21 ENCOUNTER — TELEMEDICINE (OUTPATIENT)
Dept: INTERNAL MEDICINE CLINIC | Facility: CLINIC | Age: 56
End: 2021-05-21

## 2021-05-21 DIAGNOSIS — S92.301G CLOSED DISPLACED FRACTURE OF METATARSAL BONE OF RIGHT FOOT WITH DELAYED HEALING, UNSPECIFIED METATARSAL, SUBSEQUENT ENCOUNTER: Primary | ICD-10-CM

## 2021-05-21 DIAGNOSIS — M54.16 LUMBAR RADICULOPATHY: ICD-10-CM

## 2021-05-21 DIAGNOSIS — G89.4 CHRONIC PAIN DISORDER: ICD-10-CM

## 2021-05-21 PROCEDURE — 99496 TRANSJ CARE MGMT HIGH F2F 7D: CPT | Performed by: INTERNAL MEDICINE

## 2021-05-21 RX ORDER — OXYCODONE HYDROCHLORIDE 5 MG/1
5 TABLET ORAL DAILY
Qty: 15 TABLET | Refills: 0 | Status: SHIPPED | OUTPATIENT
Start: 2021-05-21 | End: 2021-06-04 | Stop reason: SDUPTHER

## 2021-05-21 NOTE — TELEPHONE ENCOUNTER
Folder Color- [facility]    Name of Form- Physcian Certification    Form to be filled out by- Dr Kim Howell to be Faxed [phone]    Patient made aware of 10 business day policy

## 2021-05-21 NOTE — PROGRESS NOTES
Case discussed with Dr Jermaine Fenton this date  Pt has left [facility] and had an ED visit and returned to son Mick's home  SW was not sure if pt's still wanted NH placement  SW spoke with pt's son Zamzam Gongora as pt was scheduled for a Virtual visit this date  Please see PCP note  He relates that pt is doing well so far  He has moved her to the master bedroom which has a bathroom and he is relates pt is using Attend/ briefs for occasional incontinence  Pt /son are denying need for NH placement now as they want to try services at hime   He relates he has taken off time from work to assist pt  SW has explained to son that SW has previously suggest the [facility]   SW explained the [place] waiver Program and application process  They are aware it can take up to 3 months  Since family members are allowed to be paid care givers if approved he felt pt would agree to referral     Pt has agreed to [place] Waiver referral an PCP has completed the Medical Certification  The clerical Team to fax it into the [address] [place] [address]  SW will remain available to assist as indicted

## 2021-05-21 NOTE — PROGRESS NOTES
Assessment/Plan:      Problem List Items Addressed This Visit        Nervous and Auditory    Lumbar radiculopathy    Relevant Medications    oxyCODONE (ROXICODONE) 5 mg immediate release tablet       Musculoskeletal and Integument    Metatarsal fracture - Primary    Relevant Orders    Ambulatory referral to Podiatry      Other Visit Diagnoses     Chronic pain disorder        Relevant Medications    oxyCODONE (ROXICODONE) 5 mg immediate release tablet         Reason for visit is TCM    Encounter provider Kisrtin Yoo DO     Metatarsal fracture  Follow up with podiatry  Remain non weight bearing  Will have patient evaluated for waiver services, form filled and given to [employer] today    Chronic pain/lumbar radiculopathy  Pt's son reports that patient dropped some tablets of oxycodone recently and is out earlier than previous, is requesting refill early  Refill sent for oxycodone today 5mg PRN daily  Pt and son report they have enough of chronic MS Contin but will call for refills if they are running low  Med rec performed today, pt reports she was taken off of propranolol and is no longer taking it, it was removed from her med list  Ativan was removed from medlist because pt reported not taking  Provider located at [address] [place] [address] Road  [address] [place] [id number] [phone]      Recent Visits  No visits were found meeting these conditions  Showing recent visits within past 7 days and meeting all other requirements     Today's Visits  Date Type Provider Dept   05/21/21 Telemedicine Alyssia [place]keefeburgDO [address] today's visits and meeting all other requirements     Future Appointments  No visits were found meeting these conditions     Showing future appointments within next 150 days and meeting all other requirements        After connecting through [employer], the patient was identified by name and date of birth  Ced Aguayo was informed that this is a telemedicine visit and that the visit is being conducted through [facility] and patient was informed that this is a secure, HIPAA-compliant platform  She agrees to proceed     My office door was closed  No one else was in the room  She acknowledged consent and understanding of privacy and security of the video platform  The patient has agreed to participate and understands they can discontinue the visit at any time  Patient is aware this is a billable service  Subjective:     Patient ID: Ced Aguayo is a 60 y o  female  Pt was hospitalized at [facility] for ambulatory dysfunction secondary to metatarsal fracture and foot pain  She was discharged to [facility] but had an issue with staff at [facility] and requested to be taken to ED  Pt was then discharged to home of her son Lilliana Mason feels she is doing ok at home with the help of her son  She is not interested in going to a nursing home, but still requires significant care with cooking, cleaning, dressing, bathing, toileting  HPI    Review of Systems   Constitutional: Negative for chills, fatigue and fever  HENT: Negative for rhinorrhea and sore throat  Respiratory: Negative for choking, chest tightness, shortness of breath, wheezing and stridor  Cardiovascular: Negative for chest pain, palpitations and leg swelling  Gastrointestinal: Negative for abdominal pain, blood in stool, constipation, diarrhea, nausea and vomiting  Genitourinary: Negative for hematuria  Neurological: Negative for dizziness and light-headedness  Objective:    Vitals:       Physical Exam  Constitutional:       Appearance: She is well-developed  HENT:      Head: Normocephalic and atraumatic  Comments: tardive dyskinesia  Eyes:      General:         Right eye: No discharge  Left eye: No discharge  Neck:      Musculoskeletal: Normal range of motion     Pulmonary:      Effort: Pulmonary effort is normal  No respiratory distress  Abdominal:      General: There is no distension  Palpations: Abdomen is soft  Tenderness: There is no abdominal tenderness  Musculoskeletal: Normal range of motion  Skin:     General: Skin is dry  Neurological:      Mental Status: She is alert and oriented to person, place, and time  Psychiatric:         Behavior: Behavior normal            Transitional Care Management Review:  Samantha Maloney is a 60 y o  female here for TCM follow up  TCM Call (since 4/20/2021)     Hospital care reviewed  Records reviewed    Patient was hospitialized at  [facility]        Date of Admission  05/02/21    Date of discharge  05/17/21    Diagnosis  AMBULATORY DYSFUNCTION - R26 2    Disposition  Rehabilitation center ([facility])  [address] [place] [address] Ave  Call (since 4/20/2021)     I have advised the patient to call PCP with any new or worsening symptoms  Griffin Little LPN        I spent 30 minutes with the patient during this visit      Alyssia Bay DO

## 2021-05-21 NOTE — TELEPHONE ENCOUNTER
2016 Pt from OR, asleep, with oral airway and O2 support of 6 L/min via mask, respirations regular and spontaneous, vital signs within normal limits. Pt Left arm with cast and dressing, it's clean-dry and intact, elevated using 1 pillow. Left fingers pink in color, warm to touch and noted good capillary refill. Gurney set to lowest position and locked.    2040 Updated Daphney (Mother) thru telephone call regarding plan of care and discharge.    2055 Pt able to stand up, good gait, and also able to void (850ml).    2215 Discharge instructions given to pt, understood teachings, signed accordingly.    2220 Discharge instructions also given to Daphney (Mother).    2225 IV dc'd per discharge protocol.    2227 Discharge criteria met.    2238 Pt for discharge, to [facility] with RN, to home with a responsible adult. All belongings (1 bag) with the pt.   Called patient and was informed  Per patient she would call us back regarding the message because she is no break at this moment and she don't have the time to discuss this situation

## 2021-05-21 NOTE — TELEPHONE ENCOUNTER
Rosalynd Schwab gave me the form already completed  I faxed form & received confirmation   Form at my desk to be scanned

## 2021-05-22 ENCOUNTER — HOSPITAL ENCOUNTER (EMERGENCY)
Facility: HOSPITAL | Age: 56
End: 2021-05-23
Attending: EMERGENCY MEDICINE
Payer: COMMERCIAL

## 2021-05-22 DIAGNOSIS — F41.0 PANIC ATTACKS: Primary | ICD-10-CM

## 2021-05-22 LAB
ALBUMIN SERPL BCP-MCNC: 4.3 G/DL (ref 3.5–5)
ALP SERPL-CCNC: 117 U/L (ref 46–116)
ALT SERPL W P-5'-P-CCNC: 33 U/L (ref 12–78)
ANION GAP SERPL CALCULATED.3IONS-SCNC: 16 MMOL/L (ref 4–13)
AST SERPL W P-5'-P-CCNC: 17 U/L (ref 5–45)
ATRIAL RATE: 98 BPM
BASOPHILS # BLD AUTO: 0.04 THOUSANDS/ΜL (ref 0–0.1)
BASOPHILS NFR BLD AUTO: 1 % (ref 0–1)
BILIRUB SERPL-MCNC: 1.1 MG/DL (ref 0.2–1)
BILIRUB UR QL STRIP: NEGATIVE
BUN SERPL-MCNC: 6 MG/DL (ref 5–25)
CALCIUM SERPL-MCNC: 10 MG/DL (ref 8.3–10.1)
CHLORIDE SERPL-SCNC: 106 MMOL/L (ref 100–108)
CLARITY UR: NORMAL
CO2 SERPL-SCNC: 20 MMOL/L (ref 21–32)
COLOR UR: YELLOW
CREAT SERPL-MCNC: 0.96 MG/DL (ref 0.6–1.3)
EOSINOPHIL # BLD AUTO: 0 THOUSAND/ΜL (ref 0–0.61)
EOSINOPHIL NFR BLD AUTO: 0 % (ref 0–6)
ERYTHROCYTE [DISTWIDTH] IN BLOOD BY AUTOMATED COUNT: 14.3 % (ref 11.6–15.1)
ETHANOL SERPL-MCNC: <3 MG/DL (ref 0–3)
GFR SERPL CREATININE-BSD FRML MDRD: 76 ML/MIN/1.73SQ M
GLUCOSE SERPL-MCNC: 135 MG/DL (ref 65–140)
GLUCOSE UR STRIP-MCNC: NEGATIVE MG/DL
HCT VFR BLD AUTO: 44.2 % (ref 34.8–46.1)
HGB BLD-MCNC: 15.3 G/DL (ref 11.5–15.4)
HGB UR QL STRIP.AUTO: NEGATIVE
IMM GRANULOCYTES # BLD AUTO: 0.08 THOUSAND/UL (ref 0–0.2)
IMM GRANULOCYTES NFR BLD AUTO: 1 % (ref 0–2)
KETONES UR STRIP-MCNC: NEGATIVE MG/DL
LEUKOCYTE ESTERASE UR QL STRIP: NEGATIVE
LYMPHOCYTES # BLD AUTO: 0.67 THOUSANDS/ΜL (ref 0.6–4.47)
LYMPHOCYTES NFR BLD AUTO: 8 % (ref 14–44)
MCH RBC QN AUTO: 29.3 PG (ref 26.8–34.3)
MCHC RBC AUTO-ENTMCNC: 34.6 G/DL (ref 31.4–37.4)
MCV RBC AUTO: 85 FL (ref 82–98)
MONOCYTES # BLD AUTO: 0.35 THOUSAND/ΜL (ref 0.17–1.22)
MONOCYTES NFR BLD AUTO: 4 % (ref 4–12)
NEUTROPHILS # BLD AUTO: 7.38 THOUSANDS/ΜL (ref 1.85–7.62)
NEUTS SEG NFR BLD AUTO: 86 % (ref 43–75)
NITRITE UR QL STRIP: NEGATIVE
NRBC BLD AUTO-RTO: 0 /100 WBCS
P AXIS: 77 DEGREES
PH UR STRIP.AUTO: 8 [PH]
PLATELET # BLD AUTO: 389 THOUSANDS/UL (ref 149–390)
PMV BLD AUTO: 9.6 FL (ref 8.9–12.7)
POTASSIUM SERPL-SCNC: 3.4 MMOL/L (ref 3.5–5.3)
PR INTERVAL: 108 MS
PROT SERPL-MCNC: 8.3 G/DL (ref 6.4–8.2)
PROT UR STRIP-MCNC: NEGATIVE MG/DL
QRS AXIS: 86 DEGREES
QRSD INTERVAL: 90 MS
QT INTERVAL: 388 MS
QTC INTERVAL: 495 MS
RBC # BLD AUTO: 5.22 MILLION/UL (ref 3.81–5.12)
SARS-COV-2 RNA RESP QL NAA+PROBE: NEGATIVE
SODIUM SERPL-SCNC: 142 MMOL/L (ref 136–145)
SP GR UR STRIP.AUTO: 1.01 (ref 1–1.03)
T WAVE AXIS: 46 DEGREES
TSH SERPL DL<=0.05 MIU/L-ACNC: 0.98 UIU/ML (ref 0.36–3.74)
UROBILINOGEN UR QL STRIP.AUTO: 1 E.U./DL
VENTRICULAR RATE: 98 BPM
WBC # BLD AUTO: 8.52 THOUSAND/UL (ref 4.31–10.16)

## 2021-05-22 PROCEDURE — 99285 EMERGENCY DEPT VISIT HI MDM: CPT

## 2021-05-22 PROCEDURE — 99285 EMERGENCY DEPT VISIT HI MDM: CPT | Performed by: EMERGENCY MEDICINE

## 2021-05-22 PROCEDURE — 93010 ELECTROCARDIOGRAM REPORT: CPT | Performed by: INTERNAL MEDICINE

## 2021-05-22 PROCEDURE — 96375 TX/PRO/DX INJ NEW DRUG ADDON: CPT

## 2021-05-22 PROCEDURE — 85025 COMPLETE CBC W/AUTO DIFF WBC: CPT | Performed by: EMERGENCY MEDICINE

## 2021-05-22 PROCEDURE — 82077 ASSAY SPEC XCP UR&BREATH IA: CPT | Performed by: EMERGENCY MEDICINE

## 2021-05-22 PROCEDURE — 81003 URINALYSIS AUTO W/O SCOPE: CPT | Performed by: EMERGENCY MEDICINE

## 2021-05-22 PROCEDURE — 80307 DRUG TEST PRSMV CHEM ANLYZR: CPT | Performed by: EMERGENCY MEDICINE

## 2021-05-22 PROCEDURE — U0005 INFEC AGEN DETEC AMPLI PROBE: HCPCS | Performed by: EMERGENCY MEDICINE

## 2021-05-22 PROCEDURE — U0003 INFECTIOUS AGENT DETECTION BY NUCLEIC ACID (DNA OR RNA); SEVERE ACUTE RESPIRATORY SYNDROME CORONAVIRUS 2 (SARS-COV-2) (CORONAVIRUS DISEASE [COVID-19]), AMPLIFIED PROBE TECHNIQUE, MAKING USE OF HIGH THROUGHPUT TECHNOLOGIES AS DESCRIBED BY CMS-2020-01-R: HCPCS | Performed by: EMERGENCY MEDICINE

## 2021-05-22 PROCEDURE — 96361 HYDRATE IV INFUSION ADD-ON: CPT

## 2021-05-22 PROCEDURE — 96374 THER/PROPH/DIAG INJ IV PUSH: CPT

## 2021-05-22 PROCEDURE — 84443 ASSAY THYROID STIM HORMONE: CPT | Performed by: EMERGENCY MEDICINE

## 2021-05-22 PROCEDURE — 80053 COMPREHEN METABOLIC PANEL: CPT | Performed by: EMERGENCY MEDICINE

## 2021-05-22 PROCEDURE — 96376 TX/PRO/DX INJ SAME DRUG ADON: CPT

## 2021-05-22 PROCEDURE — 36415 COLL VENOUS BLD VENIPUNCTURE: CPT | Performed by: EMERGENCY MEDICINE

## 2021-05-22 PROCEDURE — 93005 ELECTROCARDIOGRAM TRACING: CPT

## 2021-05-22 RX ORDER — QUETIAPINE FUMARATE 100 MG/1
300 TABLET, FILM COATED ORAL
Status: DISCONTINUED | OUTPATIENT
Start: 2021-05-22 | End: 2021-05-23 | Stop reason: HOSPADM

## 2021-05-22 RX ORDER — ACETAMINOPHEN 325 MG/1
975 TABLET ORAL ONCE
Status: COMPLETED | OUTPATIENT
Start: 2021-05-22 | End: 2021-05-22

## 2021-05-22 RX ORDER — LORAZEPAM 2 MG/ML
1 INJECTION INTRAMUSCULAR ONCE
Status: DISCONTINUED | OUTPATIENT
Start: 2021-05-22 | End: 2021-05-22

## 2021-05-22 RX ORDER — HYDROXYZINE HYDROCHLORIDE 25 MG/1
50 TABLET, FILM COATED ORAL
Status: DISCONTINUED | OUTPATIENT
Start: 2021-05-23 | End: 2021-05-23 | Stop reason: HOSPADM

## 2021-05-22 RX ORDER — PREGABALIN 75 MG/1
225 CAPSULE ORAL 2 TIMES DAILY
Status: DISCONTINUED | OUTPATIENT
Start: 2021-05-22 | End: 2021-05-23 | Stop reason: HOSPADM

## 2021-05-22 RX ORDER — LORAZEPAM 2 MG/ML
2 INJECTION INTRAMUSCULAR ONCE
Status: COMPLETED | OUTPATIENT
Start: 2021-05-22 | End: 2021-05-22

## 2021-05-22 RX ORDER — NORTRIPTYLINE HYDROCHLORIDE 10 MG/1
10 CAPSULE ORAL
Status: DISCONTINUED | OUTPATIENT
Start: 2021-05-22 | End: 2021-05-23 | Stop reason: HOSPADM

## 2021-05-22 RX ORDER — MORPHINE SULFATE 4 MG/ML
4 INJECTION, SOLUTION INTRAMUSCULAR; INTRAVENOUS ONCE
Status: COMPLETED | OUTPATIENT
Start: 2021-05-22 | End: 2021-05-22

## 2021-05-22 RX ORDER — LORAZEPAM 2 MG/ML
0.5 INJECTION INTRAMUSCULAR ONCE
Status: COMPLETED | OUTPATIENT
Start: 2021-05-22 | End: 2021-05-22

## 2021-05-22 RX ORDER — LORAZEPAM 2 MG/ML
1 INJECTION INTRAMUSCULAR ONCE
Status: COMPLETED | OUTPATIENT
Start: 2021-05-22 | End: 2021-05-22

## 2021-05-22 RX ADMIN — LORAZEPAM 0.5 MG: 2 INJECTION INTRAMUSCULAR; INTRAVENOUS at 16:41

## 2021-05-22 RX ADMIN — SODIUM CHLORIDE 1000 ML: 0.9 INJECTION, SOLUTION INTRAVENOUS at 15:14

## 2021-05-22 RX ADMIN — LORAZEPAM 1 MG: 2 INJECTION INTRAMUSCULAR; INTRAVENOUS at 21:15

## 2021-05-22 RX ADMIN — ACETAMINOPHEN 975 MG: 325 TABLET, FILM COATED ORAL at 19:09

## 2021-05-22 RX ADMIN — NORTRIPTYLINE HYDROCHLORIDE 10 MG: 10 CAPSULE ORAL at 23:14

## 2021-05-22 RX ADMIN — MORPHINE SULFATE 4 MG: 4 INJECTION INTRAVENOUS at 19:10

## 2021-05-22 RX ADMIN — QUETIAPINE FUMARATE 300 MG: 100 TABLET ORAL at 23:14

## 2021-05-22 RX ADMIN — LORAZEPAM 2 MG: 2 INJECTION INTRAMUSCULAR; INTRAVENOUS at 15:14

## 2021-05-22 RX ADMIN — PREGABALIN 225 MG: 75 CAPSULE ORAL at 23:14

## 2021-05-22 NOTE — ED NOTES
Bed Search    [facility] - Per Adrian Talamantes, No [address] Per Angeles Hopkins, No [facility]  [facility] - No Answer    [facility] Per marissa, Reviewing for GUNNAR Menard

## 2021-05-22 NOTE — ED PROVIDER NOTES
History  Chief Complaint   Patient presents with    Anxiety     Pt reports calling 911 because " I think Itookmuch of my anxiety medication  Pt reports taking 2 atarax 1 cymbalta,2 buspar"      47-year-old female presents with anxiety  Patient reports that she is having a panic attack  She is shaking and says she is having pain all over  She says she is having trouble breathing  Patient says that this feels like her normal panic attacks  Patient reportedly called 911 because she took too much of her anxiety medication  Patient is now telling myself that she is taking her anxiety medication as directed  Patient states that she took 2 BuSpar and 2 Atarax around 10:00 a m  because of this panic attack  Patient's story about how much medication she has taken has changed since she got here  It is unclear what medication she took  Patient is moving her tongue around a lot  She says that her mouth feels dry  She has no chest pain  No other complaints  Patient does not want to sign herself in for psychiatric care  She has no thoughts of hurting herself or anyone else  Prior to Admission Medications   Prescriptions Last Dose Informant Patient Reported? Taking?    [facility] Vitamin C 500 MG tablet   No Yes   Sig: TAKE 1 TABLET BY MOUTH TWICE A DAY   DULoxetine (CYMBALTA) 60 mg delayed release capsule   No Yes   Sig: TAKE 1 CAPSULE BY MOUTH EVERY DAY   Diapers & Supplies (Huggies Pull-Ups) MISC   No Yes   Sig: Use 3 (three) times a day   Mouthwashes (Biotene Dry Mouth) LIQD   Yes Yes   Sig: Apply 5 mL to the mouth or throat daily   Multiple Vitamins-Minerals (multivitamin with minerals) tablet  Outside Facility (Specify) No Yes   Sig: Take 1 tablet by mouth daily   Polyethylene Glycol 3350 (MIRALAX PO)  Outside Facility (Specify) Yes Yes   Sig: Take by mouth   QUEtiapine (SEROquel) 300 mg tablet  Self Yes Yes   Sig: Take 300 mg by mouth daily at bedtime   Respiratory Therapy Supplies (Nebulizer) YUDY Not Taking at Unknown time  No No   Sig: Use as needed (Shortness of breath)   Patient not taking: Reported on 4/20/2021   SIMETHICONE PO  Outside Facility ([address] [place] [address]) Yes Yes   Sig: Take by mouth as needed    Sennosides (SENEXON PO)  Outside Facility ([address] [place] [address]) Yes Yes   Sig: Take by mouth   Valbenazine Tosylate (Ingrezza) 80 MG CAPS   No Yes   Sig: Take 80 mg by mouth daily   acetaminophen (TYLENOL) 325 mg tablet   No Yes   Sig: Take 2 tablets (650 mg total) by mouth every 8 (eight) hours as needed for mild pain   amLODIPine (NORVASC) 5 mg tablet  Outside Facility (Specify) No Yes   Sig: Take 1 tablet (5 mg total) by mouth daily   bisacodyl (DULCOLAX) 5 mg EC tablet  Outside Facility (Specify) Yes Yes   Sig: Take 5 mg by mouth daily as needed for constipation   busPIRone (BUSPAR) 15 mg tablet   No Yes   Sig: Take 1 tablet (15 mg total) by mouth 3 (three) times a day   cetirizine (ZyrTEC) 10 mg tablet  Outside Facility (Specify) Yes Yes   Sig: Take 10 mg by mouth as needed    cholecalciferol (VITAMIN D3) 1,000 units tablet  Outside Facility (Specify) No Yes   Sig: Take 1 tablet (1,000 Units total) by mouth daily   diclofenac sodium (VOLTAREN) 1 %  Outside Facility (Specify) No Yes   Sig: APPLY 4 G TOPICALLY 4 (FOUR) TIMES A DAY   Patient taking differently: Apply 4 g topically 2 (two) times a day    ferrous sulfate 324 (65 Fe) mg   No Yes   Sig: TAKE 1 TABLET (324 MG TOTAL) BY MOUTH 2 (TWO) TIMES A DAY BEFORE MEALS   folic acid (FOLVITE) 1 mg tablet   No Yes   Sig: TAKE 1 TABLET BY MOUTH EVERY DAY   hydrOXYzine HCL (ATARAX) 50 mg tablet   No Yes   Sig: Take 1 tablet (50 mg total) by mouth daily at bedtime as needed for anxiety (insomnia)   ipratropium-albuterol (DUO-NEB) 0 5-2 5 mg/3 mL nebulizer solution Not Taking at Unknown time  No No   Sig: Take 1 vial (3 mL total) by nebulization every 6 (six) hours as needed for wheezing or shortness of breath   Patient not taking: Reported on 4/20/2021   lidocaine (LMX) 4 % cream Not Taking at Unknown time Outside Facility [facility]) No No   Sig: Apply topically as needed for mild pain   Patient not taking: Reported on 2021   morphine (MS CONTIN) 15 mg 12 hr tablet   No Yes   Sig: Take 1 tablet (15 mg total) by mouth 2 (two) times a day Hold if sedatedMax Daily Amount: 30 mg   naloxone (NARCAN) 4 mg/0 1 mL nasal spray Not Taking at Unknown time  No No   Sig: Administer 1 spray into a nostril  If no response after 2-3 minutes, give another dose in the other nostril using a new spray     Patient not taking: Reported on 2021   nortriptyline (PAMELOR) 10 mg capsule   No Yes   Sig: Take 1 capsule (10 mg total) by mouth daily at bedtime   omeprazole (PriLOSEC) 20 mg delayed release capsule   No Yes   Sig: Take 1 capsule (20 mg total) by mouth daily   ondansetron (ZOFRAN-ODT) 4 mg disintegrating tablet Not Taking at Unknown time Outside Facility (Specify) No No   Sig: Take 1 tablet (4 mg total) by mouth every 6 (six) hours as needed for nausea or vomiting   Patient not taking: Reported on 2021   oxyCODONE (ROXICODONE) 5 mg immediate release tablet   No Yes   Sig: Take 1 tablet (5 mg total) by mouth dailyMax Daily Amount: 5 mg   oxybutynin (DITROPAN) 5 mg tablet   No Yes   Sig: Take 1 tablet (5 mg total) by mouth 2 (two) times a day   potassium chloride (MICRO-K) 10 MEQ CR capsule  Self Yes Yes   Sig: Take 10 mEq by mouth 2 (two) times a day Two tabs, two times daily   prazosin (MINIPRESS) 2 mg capsule  Outside Facility (Specify) No Yes   Sig: TAKE 1 CAPSULE BY MOUTH EVERY DAY   pregabalin (LYRICA) 225 MG capsule   No Yes   Sig: TAKE 1 CAPSULE (225 MG TOTAL) BY MOUTH EVERY 12 (TWELVE) HOURS   sodium chloride (OCEAN) 0 65 % nasal spray   Yes Yes   Si sprays into each nostril as needed   traZODone (DESYREL) 100 mg tablet  Self Yes Yes   Sig: Take 100 mg by mouth daily at bedtime 2 tablets daily at bedtime   triamcinolone (KENALOG) 0 025 % cream Not Taking at Unknown time Outside Facility (Specify) No No   Sig: Apply topically 2 (two) times a day   Patient not taking: Reported on 2021      Facility-Administered Medications: None       Past Medical History:   Diagnosis Date    Acid reflux     Anxiety     RESOLVED: [id number]    Arthritis     Bipolar 2 disorder (HCC)     FOLLOWS WITH PSYCHIATRIST  CONTINUE LAMOTRIGINE; RESOLVED: [id number]    Depression     Familial tremor     both hands    Fibromyalgia     LAST ASSESSED: [id number]    Hearing aid worn     left ear    [place] (hard of hearing)     left ear    Hypertension     Left-sided weakness     Lower back pain     Memory loss of unknown cause     long and short term    Migraine     Obesity     Obesity, Class II, BMI 35-39 9     Overactive bladder     Panic attack     Post traumatic stress disorder     Seasonal allergies     Stroke [facility])     questionable stroke 2009    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    CONT ASA 81 MG DAILY; RESOLVED: [id number]    Urinary incontinence     Wears dentures     partial lower / full upper    Wears glasses        Past Surgical History:   Procedure Laterality Date    BACK SURGERY       SECTION      COLONOSCOPY      RESOLVED: [id number]    EAR SURGERY      EGD      HYSTERECTOMY      MYRINGOTOMY W/ TUBES Left     NECK SURGERY  2019    NJ CYSTOURETHROSCOPY N/A 2016    Procedure: CYSTOSCOPY, BOTOX INJECTION;  Surgeon: Rich Serna MD;  Location: [facility];  Service: Gynecology    NJ IMPLANT SPINAL NEUROSTIM/ Right 2/10/2021    Procedure: REPLACEMENT IMPLANTABLE PULSE GENERATOR DORSAL SPINAL COLUMN STIMULATOR, RIGHT;  Surgeon: Mini Velasquez MD;  [facility];  Service: Neurosurgery    NJ PERCUT IMPLNT [address] Right 2020    Procedure: INSERTION THORACIC DORSAL COLUMN SPINAL CORD STIMULATOR PERCUTANEOUS W IMPLANTABLE PULSE GENERATOR, RIGHT;  Surgeon: Mini Velasquez MD;  [facility]; Service: Neurosurgery    TONSILLECTOMY      TUBAL LIGATION  1986    UPPER GASTROINTESTINAL ENDOSCOPY  09/2020       Family History   Problem Relation Age of Onset    Colon cancer Mother     Alzheimer's disease Father     Stroke Father     Colon cancer Brother     Bipolar disorder Brother     Breast cancer Maternal Aunt     Colon cancer Maternal Aunt     Heart disease Other     Diabetes Other     Hypertension Other     Seizures Son     Depression Paternal Grandfather     No Known Problems Sister     No Known Problems Brother     Thyroid disease Neg Hx      I have reviewed and agree with the history as documented  E-Cigarette/Vaping    E-Cigarette Use Never User      E-Cigarette/Vaping Substances    Nicotine No     THC No     CBD No     Flavoring No     Other No     Unknown No      Social History     Tobacco Use    Smoking status: Never Smoker    Smokeless tobacco: Never Used   Substance Use Topics    Alcohol use: Not Currently     Comment: 2 x year; being a social drinker as per all scripts     Drug use: No        Review of Systems   Constitutional: Negative for chills, fatigue and fever  HENT: Negative for congestion, rhinorrhea and sore throat  Eyes: Negative for pain and redness  Respiratory: Positive for shortness of breath  Negative for cough, chest tightness and wheezing  Cardiovascular: Negative for chest pain and palpitations  Gastrointestinal: Negative for abdominal pain, diarrhea, nausea and vomiting  Endocrine: Negative  Genitourinary: Negative for difficulty urinating and hematuria  Musculoskeletal: Negative for back pain and myalgias  Skin: Negative for pallor and rash  Allergic/Immunologic: Negative  Neurological: Negative for dizziness, weakness, light-headedness and headaches  Hematological: Negative          Physical Exam  ED Triage Vitals [05/22/21 1433]   Temperature Pulse Respirations Blood Pressure SpO2   97 9 °F (36 6 °C) (!) 138 22 (!) 178/114 99 %      Temp Source Heart Rate Source Patient Position - Orthostatic VS BP Location FiO2 (%)   Oral Monitor Lying Right arm --      Pain Score       Worst Possible Pain             Orthostatic Vital Signs  Vitals:    05/22/21 1433 05/22/21 1709 05/23/21 0334 05/23/21 1246   BP: (!) 178/114 (!) 158/101 136/89 149/91   Pulse: (!) 138 97 87 97   Patient Position - Orthostatic VS: Lying Lying Lying        Physical Exam  Vitals signs and nursing note reviewed  Constitutional:       Appearance: Normal appearance  Comments: Patient is laying in the bed shaking and moaning  She can stop the shaking when commanded  She is moving her tongue around her lips  It is also intentional movement  HENT:      Head: Normocephalic and atraumatic  Eyes:      Conjunctiva/sclera: Conjunctivae normal    Neck:      Musculoskeletal: Normal range of motion and neck supple  Cardiovascular:      Rate and Rhythm: Normal rate and regular rhythm  Heart sounds: No murmur  Pulmonary:      Effort: Pulmonary effort is normal  No respiratory distress  Breath sounds: Normal breath sounds  Musculoskeletal: Normal range of motion  Skin:     General: Skin is warm and dry  Neurological:      General: No focal deficit present  Mental Status: She is alert and oriented to person, place, and time           ED Medications  Medications   hydrOXYzine HCL (ATARAX) tablet 50 mg (has no administration in time range)   nortriptyline (PAMELOR) capsule 10 mg (10 mg Oral Given 5/22/21 2314)   pregabalin (LYRICA) capsule 225 mg (225 mg Oral Given 5/23/21 1033)   QUEtiapine (SEROquel) tablet 300 mg (300 mg Oral Given 5/22/21 2314)   oxybutynin (DITROPAN) tablet 5 mg (0 mg Oral Hold 5/23/21 0121)   oxybutynin (DITROPAN) tablet 5 mg (has no administration in time range)   LORazepam (ATIVAN) injection 2 mg (2 mg Intravenous Given 5/22/21 1514)   sodium chloride 0 9 % bolus 1,000 mL (0 mL Intravenous Stopped 5/22/21 1800) LORazepam (ATIVAN) injection 0 5 mg (0 5 mg Intravenous Given 5/22/21 1641)   morphine (PF) 4 mg/mL injection 4 mg (4 mg Intravenous Given 5/22/21 1910)   acetaminophen (TYLENOL) tablet 975 mg (975 mg Oral Given 5/22/21 1909)   LORazepam (ATIVAN) injection 1 mg (1 mg Intravenous Given 5/22/21 2115)   LORazepam (ATIVAN) tablet 0 5 mg (0 5 mg Oral Given 5/23/21 1244)       Diagnostic Studies  Results Reviewed     Procedure Component Value Units Date/Time    Novel Coronavirus (Covid-19),PCR [facility] - 2 Hour Stat [[id number]]  (Normal) Collected: 05/22/21 1516    Lab Status: Final result Specimen: Nares from Nasopharyngeal Swab Updated: 05/22/21 1639     SARS-CoV-2 Negative    Narrative:           UA w Reflex to Microscopic w Reflex to Culture [[id number]] Collected: 05/22/21 1611    Lab Status: Final result Specimen: Urine, Clean Catch Updated: 05/22/21 1636     Color, UA Yellow     Clarity, UA Cloudy     Specific Valentine, UA 1 011     pH, UA 8 0     Leukocytes, UA Negative     Nitrite, UA Negative     Protein, UA Negative mg/dl      Glucose, UA Negative mg/dl      Ketones, UA Negative mg/dl      Urobilinogen, UA 1 0 E U /dl      Bilirubin, UA Negative     Blood, UA Negative    Rapid drug screen, urine [[id number]] Collected: 05/22/21 1611    Lab Status: No result Specimen: Urine, Clean Catch     Comprehensive metabolic panel [[id number]]  (Abnormal) Collected: 05/22/21 1516    Lab Status: Final result Specimen: Blood from Arm, Left Updated: 05/22/21 1603     Sodium 142 mmol/L      Potassium 3 4 mmol/L      Chloride 106 mmol/L      CO2 20 mmol/L      ANION GAP 16 mmol/L      BUN 6 mg/dL      Creatinine 0 96 mg/dL      Glucose 135 mg/dL      Calcium 10 0 mg/dL      AST 17 U/L      ALT 33 U/L      Alkaline Phosphatase 117 U/L      Total Protein 8 3 g/dL      Albumin 4 3 g/dL      Total Bilirubin 1 10 mg/dL      eGFR 76 ml/min/1 73sq m     Narrative:      [place] guidelines for Chronic Kidney Disease (CKD):     Stage 1 with normal or high GFR (GFR > 90 mL/min/1 73 square meters)    Stage 2 Mild CKD (GFR = 60-89 mL/min/1 73 square meters)    Stage 3A Moderate CKD (GFR = 45-59 mL/min/1 73 square meters)    Stage 3B Moderate CKD (GFR = 30-44 mL/min/1 73 square meters)    Stage 4 Severe CKD (GFR = 15-29 mL/min/1 73 square meters)    Stage 5 End Stage CKD (GFR <15 mL/min/1 73 square meters)  Note: GFR calculation is accurate only with a steady state creatinine    TSH [[id number]]  (Normal) Collected: 05/22/21 1516    Lab Status: Final result Specimen: Blood from Arm, Left Updated: 05/22/21 1603     TSH 3RD GENERATON 0 981 uIU/mL     Narrative:      Patients undergoing fluorescein dye angiography may retain small amounts of fluorescein in the body for 48-72 hours post procedure  Samples containing fluorescein can produce falsely depressed TSH values  If the patient had this procedure,a specimen should be resubmitted post fluorescein clearance        Ethanol [[id number]]  (Normal) Collected: 05/22/21 1516    Lab Status: Final result Specimen: Blood from Arm, Left Updated: 05/22/21 1552     Ethanol Lvl <3 mg/dL     CBC and differential [[id number]]  (Abnormal) Collected: 05/22/21 1516    Lab Status: Final result Specimen: Blood from Arm, Left Updated: 05/22/21 1526     WBC 8 52 Thousand/uL      RBC 5 22 Million/uL      Hemoglobin 15 3 g/dL      Hematocrit 44 2 %      MCV 85 fL      MCH 29 3 pg      MCHC 34 6 g/dL      RDW 14 3 %      MPV 9 6 fL      Platelets 370 Thousands/uL      nRBC 0 /100 WBCs      Neutrophils Relative 86 %      Immat GRANS % 1 %      Lymphocytes Relative 8 %      Monocytes Relative 4 %      Eosinophils Relative 0 %      Basophils Relative 1 %      Neutrophils Absolute 7 38 Thousands/µL      Immature Grans Absolute 0 08 Thousand/uL      Lymphocytes Absolute 0 67 Thousands/µL      Monocytes Absolute 0 35 Thousand/µL      Eosinophils Absolute 0 00 Thousand/µL      Basophils Absolute 0 04 Thousands/µL     POCT alcohol breath test [[id number]]     Lab Status: No result                  No orders to display         Procedures  Procedures      ED Course  ED Course as of May 23 1257   Sat May 22, 2021   1500 Patient would now like to speak with crisis  Crisis labs added  Klever Cervantes 251 says patient needs new covid test even though she had a negative one 5 days ago  [id number] Patient's son is now at bedside  He reports that she took 1 cymbalta, 1 morphine, 1 buspar, 2 atarax, 1 oxybutyn, and 1 Ingrezza  These are patient's normal medicines  Son is concerned because she took them a couple of hours earlier than she normally does  I assured him that this is ok      80 201 signed                                          MDM  Number of Diagnoses or Management Options  Panic attacks: new and requires workup  Diagnosis management comments: 61-year-old female presents with panic attack  Will give Ativan for her symptoms  Will re-evaluate  Amount and/or Complexity of Data Reviewed  Clinical lab tests: ordered and reviewed  Review and summarize past medical records: yes  Discuss the patient with other providers: yes    Risk of Complications, Morbidity, and/or Mortality  Presenting problems: moderate  Diagnostic procedures: moderate  Management options: moderate    Patient Progress  Patient progress: improved    Patient felt better after receiving ativan  She signed a 201  She was signed out awaiting placement  Disposition  Final diagnoses:   Panic attacks     Time reflects when diagnosis was documented in both MDM as applicable and the Disposition within this note     Time User Action Codes Description Comment    5/22/2021  7:59 PM Claudina Lombard Add [F41 0] Panic attacks       ED Disposition     ED Disposition Condition Date/Time Comment    Transfer to [address] [place] [address] May 23, 2021 11:38 AM Samantha Snyder Guerline should be transferred out to [facility] and has been medically cleared          MD Documentation      Most Recent Value   Patient Condition  The patient has been stabilized such that within reasonable medical probability, no material deterioration of the patient condition or the condition of the unborn child(mere) is likely to result from the transfer   Reason for Transfer  Level of Care needed not available at this facility [facility]]   Benefits of Transfer  Specialized equipment and/or services available at the receiving facility (Include comment)________________________ [facility]]   Risks of Transfer  Potential for delay in receiving treatment, Potential deterioration of medical condition, Increased discomfort during transfer, Possible worsening of condition or death during transfer   Accepting Physician  Dr Dennis Elizabeth Name, 1 vogogo             (Name & Tel number)  PACS   Transported by (Company and Unit #)  [employer]   Provider Certification  The patient is stable for psychiatric transfer because they are medically stable, and is protected from harming him/herself or others during transport, General risk, such as traffic hazards, adverse weather conditions, rough terrain or turbulence, possible failure of equipment (including vehicle or aircraft), or consequences of actions of persons outside the control of the transport personnel, The possibility of a transport vehicle being unavailable, Unanticipated needs of medical equipment and personnel during transport      RN Documentation      Most [facility] Name, 1 vogogo             (Name & Tel number)  PACS   Transported by (Company and Unit #)  [employer]      Follow-up Information    None         Patient's Medications   Discharge Prescriptions    No medications on file     No discharge procedures on file      PDMP Review       Value Time User    PDMP Reviewed  Yes 9/5/2020 12:53 Roe Encarnacion MD           ED Provider  Attending physically available and evaluated Samantha Guajardo I managed the patient along with the ED Attending      Electronically Signed by         Rakesh Urbina DO  05/23/21 1257

## 2021-05-22 NOTE — ED ATTENDING ATTESTATION
Julia Hendrickson MD, saw and evaluated the patient  I have discussed the patient with the resident and agree with the resident's findings, Plan of Care, and MDM as documented in the resident's note, except where noted  All available labs and Radiology studies were reviewed  I was present for key portions of any procedure(s) performed by the resident and I was immediately available to provide assistance  At this point I agree with the current assessment done in the Emergency Department  I have conducted an independent evaluation of this patient a history and physical is as follows:    60 female with a complicated past medical history including fibromyalgia, HTN, bipolar disorder, anxiety, multiple chronic pain syndromes, and MIMI presents to the ED complaining of severe anxiety  The patient reports "pain all over", shortness of breath, and "the shakes"  All of these symptoms are consistent with her past panic attacks  The patient took her prescribed medications but her symptoms did not improve so she called 9-1-1  No SI, HI, or hallucinations  No other specific complaints  Gen: anxious appearing, AA&Ox3  HEENT: PERRL, EOMI  Neck: supple  CV: (+) tachycardia  Lungs: CT AB/L  Abdomen: soft, NT/ND  Ext: no swelling or deformity  Neuro: 5/5 strength all extremities, (+) voluntary tremors      ED Course  The patient is very anxious appearing, tachycardic and hyperactive  Symptoms are reportedly consistent with past panic attacks  No SI, HI, or hallucinations  The patient is requesting to speak with the crisis worker  IV anxiolytics administered  Plan for Crisis evaluation after medical clearance completed  Disposition per crisis worker recommendations  19:00 Care signed out to Dr Jeovanny Alas with crisis consult pending          Critical Care Time  Procedures

## 2021-05-23 VITALS
RESPIRATION RATE: 18 BRPM | DIASTOLIC BLOOD PRESSURE: 91 MMHG | SYSTOLIC BLOOD PRESSURE: 149 MMHG | WEIGHT: 210 LBS | TEMPERATURE: 97.9 F | BODY MASS INDEX: 39.68 KG/M2 | OXYGEN SATURATION: 99 % | HEART RATE: 97 BPM

## 2021-05-23 PROCEDURE — NC001 PR NO CHARGE: Performed by: EMERGENCY MEDICINE

## 2021-05-23 RX ORDER — OXYBUTYNIN CHLORIDE 5 MG/1
5 TABLET ORAL ONCE
Status: DISCONTINUED | OUTPATIENT
Start: 2021-05-23 | End: 2021-05-23 | Stop reason: HOSPADM

## 2021-05-23 RX ORDER — OXYBUTYNIN CHLORIDE 5 MG/1
5 TABLET ORAL 2 TIMES DAILY
Status: DISCONTINUED | OUTPATIENT
Start: 2021-05-24 | End: 2021-05-23 | Stop reason: HOSPADM

## 2021-05-23 RX ORDER — LORAZEPAM 0.5 MG/1
0.5 TABLET ORAL ONCE
Status: COMPLETED | OUTPATIENT
Start: 2021-05-23 | End: 2021-05-23

## 2021-05-23 RX ADMIN — PREGABALIN 225 MG: 75 CAPSULE ORAL at 10:33

## 2021-05-23 RX ADMIN — LORAZEPAM 0.5 MG: 0.5 TABLET ORAL at 12:44

## 2021-05-23 NOTE — ED NOTES
Chief Complaint   Patient presents with    Anxiety     Pt reports calling 911 because " I think Itookmuch of my anxiety medication  Pt reports taking 2 atarax 1 cymbalta,2 buspar"      Pt is a 61 Y/o Female who presented to ED for Severe anxiety and Panic Attacks  Pt Denies SI/HI/AH/VH  Pt claims to have OP Services with [facility]  Pt is asking to go IP at this moment under a 201 for medication management and not being able to function properly with her anxiety  Pt claims that she is eating or sleeping too well and is thinking that if her medications get adjusted that it would help her overall health  201 Signed     Crisis Intake Completed  Safety Risk Assessment Completed

## 2021-05-23 NOTE — ED NOTES
[place]-clinical faxed for review  [facility]-spoke with [facility], no beds  F[facility]-spoke with [facility] - no answer left message   [facility] - reviewing patient in am        201 faxed to intake

## 2021-05-23 NOTE — EMTALA/ACUTE CARE TRANSFER
[address] [address]  Dept: [phone]      ACRJXD TRANSFER CONSENT    NAME Samantha Harrison 1965                              MRN [id number]    I have been informed of my rights regarding examination, treatment, and transfer   by Dr Aneesh Narvaez MD    Benefits: Specialized equipment and/or services available at the receiving facility (Include comment)________________________(Behavioral health)    Risks: Potential for delay in receiving treatment, Potential deterioration of medical condition, Increased discomfort during transfer, Possible worsening of condition or death during transfer      Consent for Transfer:  I acknowledge that my medical condition has been evaluated and explained to me by the emergency department physician or other qualified medical person and/or my attending physician, who has recommended that I be transferred to the service of  Accepting Physician: Dr Peggy Navarro at [address] [place] [address] [facility] : [facility]           The above potential benefits of such transfer, the potential risks associated with such transfer, and the probable risks of not being transferred have been explained to me, and I fully understand them  The doctor has explained that, in my case, the benefits of transfer outweigh the risks  I agree to be transferred  I authorize the performance of emergency medical procedures and treatments upon me in both transit and upon arrival at the receiving facility  Additionally, I authorize the release of any and all medical records to the receiving facility and request they be transported with me, if possible  I understand that the safest mode of transportation during a medical emergency is an ambulance and that the Hospital advocates the use of this mode of transport   Risks of traveling to the receiving facility by car, including absence of medical control, life sustaining equipment, such as oxygen, and medical personnel has been explained to me and I fully understand them  (KAYCE CORRECT BOX BELOW)  [  ]  I consent to the stated transfer and to be transported by ambulance/helicopter  [  ]  I consent to the stated transfer, but refuse transportation by ambulance and accept full responsibility for my transportation by car  I understand the risks of non-ambulance transfers and I exonerate the Hospital and its staff from any deterioration in my condition that results from this refusal     X___________________________________________    DATE  05/23/21  TIME________  Signature of patient or legally responsible individual signing on patient behalf           RELATIONSHIP TO PATIENT_________________________          Provider Certification    NAME Samantha Langley 1965                              MRN [id number]    A medical screening exam was performed on the above named patient  Based on the examination:    Condition Necessitating Transfer The encounter diagnosis was Panic attacks      Patient Condition: The patient has been stabilized such that within reasonable medical probability, no material deterioration of the patient condition or the condition of the unborn child(mere) is likely to result from the transfer    Reason for Transfer: Level of Care needed not available at this facility(Behavioral health)    Transfer Requirements: [address]            · Space available and qualified personnel available for treatment as acknowledged by PACS  · Agreed to accept transfer and to provide appropriate medical treatment as acknowledged by       Dr Shanice Buenrostro  · Appropriate medical records of the examination and treatment of the patient are provided at the time of transfer   [address] _______  · Transfer will be performed by qualified personnel from [facility]  and appropriate transfer equipment as required, including the use of necessary and appropriate life support measures  Provider Certification: I have examined the patient and explained the following risks and benefits of being transferred/refusing transfer to the patient/family:  The patient is stable for psychiatric transfer because they are medically stable, and is protected from harming him/herself or others during transport, General risk, such as traffic hazards, adverse weather conditions, rough terrain or turbulence, possible failure of equipment (including vehicle or aircraft), or consequences of actions of persons outside the control of the transport personnel, The possibility of a transport vehicle being unavailable, Unanticipated needs of medical equipment and personnel during transport      Based on these reasonable risks and benefits to the patient and/or the unborn child(mere), and based upon the information available at the time of the patients examination, I certify that the medical benefits reasonably to be expected from the provision of appropriate medical treatments at another medical facility outweigh the increasing risks, if any, to the individuals medical condition, and in the case of labor to the unborn child, from effecting the transfer      X____________________________________________ DATE 05/23/21        TIME_______      ORIGINAL - SEND TO MEDICAL RECORDS   COPY - SEND WITH PATIENT DURING TRANSFER

## 2021-05-23 NOTE — ED CARE HANDOFF
Emergency Department Sign Out Note        Sign out and transfer of care from Dr Leslie Arreguin at 5/21 1900  See Separate Emergency Department note  The patient, Jodie Lisa, was evaluated by the previous provider for anxiety  Workup Completed:  Labs and medical clearance for psychiatric evaluation    ED Course / Workup Pending (followup):  ED Crisis evaluation    Patient evaluated by ED crisis worker, patient is interested in signing in to the hospital for medication management due to severe anxiety  Two hundred one voluntary commitment signed by patient  Following initial anxiolytics administered by ED team, patient's anxiety is improved  She is resting comfortably in room 7 of the emergency department  Bed search is underway  Procedures  MDM    Disposition  Final diagnoses:   Panic attacks     Time reflects when diagnosis was documented in both MDM as applicable and the Disposition within this note     Time User Action Codes Description Comment    5/22/2021  7:59 PM Mike Mendes Add [F41 0] Panic attacks       ED Disposition     ED Disposition Condition Date/Time Comment    Transfer to [facility] May 22, 2021  7:59 PM Samantha CRISTIANO Chairez  should be transferred out to       and has been medically cleared  Follow-up Information    None       Patient's Medications   Discharge Prescriptions    No medications on file     No discharge procedures on file         ED Provider  Electronically Signed by     Vicky Johnson MD  05/23/21 0276

## 2021-05-23 NOTE — ED NOTES
Crisis Worker (CW) called [facility] and spoke to [place] and she stated that they never received fax and do not have any beds at this time

## 2021-05-23 NOTE — ED NOTES
CW called [facility] and spoke to Jodie Babinski who stated that they do not have any beds at this time

## 2021-05-23 NOTE — ED NOTES
Patient is accepted at [facility]   Patient is accepted by Dr Julio C Bennett per Lorenza Parrish 1721 is arranged with [facility]    Transportation is scheduled for 1230   Patient may go to the floor at anytime          Nurse report is to be called to [phone] prior to patient transfer

## 2021-05-23 NOTE — ED NOTES
CW called  [facility] to do COB was and spoke to Mindi who stated that a non par will be needed as it seems they are not in network  Mindi stated that Medicare is primary anyway  CW called and left a message to [address] at [facility] the above COB information as well as CW was faxing chart with note of medical clearance by Dr Brenda Puri faxed chart to [facility] that includes medical clearance note from ED Dr Brenda Puri updated Pt on acceptance to [facility] and transport time  Pt then signed EMTALA

## 2021-05-23 NOTE — ED NOTES
CW called [facility] and spoke to Papa Fermin who stated that they have a bed and will review  CW faxed 12 and chart to [facility]  - [facility] for review

## 2021-05-24 ENCOUNTER — PATIENT OUTREACH (OUTPATIENT)
Dept: INTERNAL MEDICINE CLINIC | Facility: CLINIC | Age: 56
End: 2021-05-24

## 2021-05-24 LAB
AMPHETAMINES SERPL QL SCN: NEGATIVE
BARBITURATES UR QL: NEGATIVE
BENZODIAZ UR QL: NEGATIVE
COCAINE UR QL: NEGATIVE
METHADONE UR QL: NEGATIVE
OPIATES UR QL SCN: POSITIVE
OXYCODONE+OXYMORPHONE UR QL SCN: NEGATIVE
PCP UR QL: NEGATIVE
THC UR QL: NEGATIVE

## 2021-05-24 NOTE — PROGRESS NOTES
Outpatient Care Management Note:    Received ADT alert patient at [facility] emergency room on 5/22/21 for panic attacks  Patient was discharged to [facility] for mental health admission  Per chart review patient recently discharged from [facility] and had TCM appointment with PCP office on 5/21/21  Patient living with her son and was agreeable to a referral for PA waiver program   assisted with this

## 2021-05-24 NOTE — ED NOTES
Patient called about her medications that were left in the pharmacy  Patient is aware that medications are here  Patient states that she will attempt to have one of her children come pick them up and if they are unable to get them, she will come after she gets discharged        Luan Garduno RN  05/24/21 3311

## 2021-05-26 ENCOUNTER — PATIENT OUTREACH (OUTPATIENT)
Dept: INTERNAL MEDICINE CLINIC | Facility: CLINIC | Age: 56
End: 2021-05-26

## 2021-06-01 ENCOUNTER — TELEPHONE (OUTPATIENT)
Dept: INTERNAL MEDICINE CLINIC | Facility: CLINIC | Age: 56
End: 2021-06-01

## 2021-06-01 ENCOUNTER — PATIENT OUTREACH (OUTPATIENT)
Dept: INTERNAL MEDICINE CLINIC | Facility: CLINIC | Age: 56
End: 2021-06-01

## 2021-06-01 NOTE — TELEPHONE ENCOUNTER
eBthany called from [facility] to let us know she is being discharged tomorrow 6/2/21  Appointment was scheduled for 6/10/21 with Dr Zenaida Paul  She would like a call back  She is trying to figure out what psychiatrist patient is seeing

## 2021-06-01 NOTE — PROGRESS NOTES
SW has attempted to return to [facility] from [employer] [phone]  She has called inquiring where pt was going for OP MH  SW did leave a message for her re pt's attempts to get re-established [facility] @ [phone]   Phone # provided as well as request for them to assist with ICM referral if possible  HIRAM awaits her return call

## 2021-06-02 DIAGNOSIS — F41.9 ANXIETY: ICD-10-CM

## 2021-06-03 ENCOUNTER — PATIENT OUTREACH (OUTPATIENT)
Dept: INTERNAL MEDICINE CLINIC | Facility: CLINIC | Age: 56
End: 2021-06-03

## 2021-06-03 RX ORDER — HYDROXYZINE 50 MG/1
50 TABLET, FILM COATED ORAL
Qty: 90 TABLET | Refills: 0 | Status: SHIPPED | OUTPATIENT
Start: 2021-06-03 | End: 2021-07-07 | Stop reason: HOSPADM

## 2021-06-04 ENCOUNTER — PATIENT OUTREACH (OUTPATIENT)
Dept: INTERNAL MEDICINE CLINIC | Facility: CLINIC | Age: 56
End: 2021-06-04

## 2021-06-04 DIAGNOSIS — M54.16 LUMBAR RADICULOPATHY: ICD-10-CM

## 2021-06-04 DIAGNOSIS — G89.4 CHRONIC PAIN DISORDER: ICD-10-CM

## 2021-06-04 DIAGNOSIS — I10 HYPERTENSION, UNSPECIFIED TYPE: ICD-10-CM

## 2021-06-04 NOTE — TELEPHONE ENCOUNTER
Name of medication, dose, quantity and frequency  Requested Prescriptions     Pending Prescriptions Disp Refills    amLODIPine (NORVASC) 5 mg tablet 90 tablet 3     Sig: Take 1 tablet (5 mg total) by mouth daily     Number of refills left:0    Amount of medication left:3    Pharmacy verified and updated: Yes    Additional information:

## 2021-06-04 NOTE — TELEPHONE ENCOUNTER
Name of medication, dose, quantity and frequency  Requested Prescriptions     Pending Prescriptions Disp Refills    oxyCODONE (ROXICODONE) 5 mg immediate release tablet 15 tablet 0     Sig: Take 1 tablet (5 mg total) by mouth dailyMax Daily Amount: 5 mg     Number of refills left:0    Amount of medication left:4    Pharmacy verified and updated: Yes    Additional information:

## 2021-06-04 NOTE — TELEPHONE ENCOUNTER
Name of medication, dose, quantity and frequency  Requested Prescriptions     Pending Prescriptions Disp Refills    morphine (MS CONTIN) 15 mg 12 hr tablet 30 tablet 0     Sig: Take 1 tablet (15 mg total) by mouth 2 (two) times a day Hold if sedatedMax Daily Amount: 30 mg     Number of refills left:0    Amount of medication left:0    Pharmacy verified and updated:  Yes     Additional information:

## 2021-06-04 NOTE — PROGRESS NOTES
Outpatient Care Management Note:    Called patient on home and mobile number's  No answer, message left this is the nurse  Alicia calling with PCP office to follow up with her to see how she is feeling, managing with medication and review upcoming appointments  I did remind patient of PCP appointment on Thursday 6/10 @ 9:30 am and to bring all medication with her to appointment  Patient was in ED on 5/22/21 and transferred to [facility] for mental health admission  PCP office received call from [facility] that patient will be discharged Thursday 6/3/21  SW and PCP office Starr Watters did reach out to see if they are reconnecting patient with [address]

## 2021-06-05 RX ORDER — AMLODIPINE BESYLATE 5 MG/1
5 TABLET ORAL DAILY
Qty: 90 TABLET | Refills: 3 | Status: SHIPPED | OUTPATIENT
Start: 2021-06-05 | End: 2022-03-17 | Stop reason: HOSPADM

## 2021-06-07 RX ORDER — OXYCODONE HYDROCHLORIDE 5 MG/1
5 TABLET ORAL DAILY
Qty: 15 TABLET | Refills: 0 | Status: SHIPPED | OUTPATIENT
Start: 2021-06-07 | End: 2021-06-25 | Stop reason: SDUPTHER

## 2021-06-07 RX ORDER — MORPHINE SULFATE 15 MG/1
15 TABLET, FILM COATED, EXTENDED RELEASE ORAL 2 TIMES DAILY
Qty: 30 TABLET | Refills: 0 | Status: SHIPPED | OUTPATIENT
Start: 2021-06-07 | End: 2021-06-25 | Stop reason: SDUPTHER

## 2021-06-10 ENCOUNTER — TELEMEDICINE (OUTPATIENT)
Dept: INTERNAL MEDICINE CLINIC | Facility: CLINIC | Age: 56
End: 2021-06-10

## 2021-06-10 DIAGNOSIS — F43.10 POST TRAUMATIC STRESS DISORDER: ICD-10-CM

## 2021-06-10 DIAGNOSIS — F31.81 BIPOLAR II DISORDER (HCC): Primary | ICD-10-CM

## 2021-06-10 DIAGNOSIS — F41.1 GENERALIZED ANXIETY DISORDER: ICD-10-CM

## 2021-06-10 DIAGNOSIS — Z76.89 ENCOUNTER FOR SUPPORT AND COORDINATION OF TRANSITION OF CARE: ICD-10-CM

## 2021-06-10 DIAGNOSIS — F41.0 PANIC DISORDER WITHOUT AGORAPHOBIA: ICD-10-CM

## 2021-06-10 DIAGNOSIS — R26.2 AMBULATORY DYSFUNCTION: ICD-10-CM

## 2021-06-10 DIAGNOSIS — F33.1 MAJOR DEPRESSIVE DISORDER, RECURRENT EPISODE, MODERATE DEGREE (HCC): ICD-10-CM

## 2021-06-10 PROCEDURE — G2025 DIS SITE TELE SVCS RHC/FQHC: HCPCS | Performed by: INTERNAL MEDICINE

## 2021-06-10 RX ORDER — LORAZEPAM 0.5 MG/1
TABLET ORAL EVERY 6 HOURS
Status: ON HOLD | COMMUNITY
Start: 2021-06-03 | End: 2021-07-07 | Stop reason: SDUPTHER

## 2021-06-10 NOTE — PROGRESS NOTES
Virtual Brief Visit    Assessment/Plan:    Problem List Items Addressed This Visit        Other    Bipolar II disorder ([facility] [address] ) - Primary    Relevant Medications    LORazepam (ATIVAN) 0 5 mg tablet    Generalized anxiety disorder    Relevant Medications    LORazepam (ATIVAN) 0 5 mg tablet    Major depressive disorder, recurrent episode, moderate degree (HCC)    Relevant Medications    LORazepam (ATIVAN) 0 5 mg tablet    Panic disorder without agoraphobia    Relevant Medications    LORazepam (ATIVAN) 0 5 mg tablet    Post traumatic stress disorder    Relevant Medications    LORazepam (ATIVAN) 0 5 mg tablet    Ambulatory dysfunction      Other Visit Diagnoses     Encounter for support and coordination of transition of care            1  Bipolar 2 disorder  2  Generalized anxiety disorder  3  Major depressive disorder  4  Panic disorder  5  PTSD  - patient is status post recent elective mental health hospitalization for uncontrolled panic attack symptoms  - since discharge, patient reports feeling much improved, however her medical regimen is extensive and unfortunately patient does not have a firm grasp on all of her current therapies  - additionally, patient was started on scheduled Ativan 0 5 mg q 6 hours upon discharge, which she was made aware will need to be continued only by Psychiatry and will not be continued by our office  - as patient is unclear of when her next outpatient psychiatry appointment is, will have in house social work care management team recheck the patient to sister in this regard  - will have patient schedule follow-up visit in 1 month with all of her medications for thorough medication reconciliation    6   Ambulatory dysfunction  - patient is status post multiple nondisplaced metatarsal fractures  - per chart review, patient did not obtain post acute rehab following recent hospitalization  - as patient has not scheduled recommended appointment with Podiatry, will have staff call her to help arrange this  - will also have staff recheck the patient to see if there are any services she may qualify for to help her with transportation    7  Encounter for support and transition of care  - attempted medication reconciliation  - TCM call made    It was my intent to perform this visit via video technology but the patient was not able to do a video connection so the visit was completed via audio telephone only  Reason for visit is   Chief Complaint   Patient presents with    Transition of Care Management    Virtual Regular Visit    Virtual Brief Visit        Encounter provider Jerrilyn Spatz, DO    Provider located at [address] [place] [address] [address]  [address] [facility] [address]   [facility] [address] [phone]    Recent Visits  No visits were found meeting these conditions  Showing recent visits within past 7 days and meeting all other requirements     Today's Visits  Date Type Provider Dept   06/10/21 Telemedicine [facility] today's visits and meeting all other requirements     Future Appointments  No visits were found meeting these conditions  Showing future appointments within next 150 days and meeting all other requirements        After connecting through telephone, the patient was identified by name and date of birth  Albino Adrian was informed that this is a telemedicine visit and that the visit is being conducted through telephone  My office door was closed  No one else was in the room  She acknowledged consent and understanding of privacy and security of the platform  The patient has agreed to participate and understands she can discontinue the visit at any time  Patient is aware this is a billable service       Tamiko Dowling is a 60 y o  female with  Past medical history significant for bipolar disorder, PTSD, panic attacks, fibromyalgia, chronic back pain, hypertension, urinary incontinence / overactive bladder, and migraines who presents today via telephone visit for hospital follow-up  Last presentation to clinic 05/21/2021 with Dr Morris Griffiths via telemedicine  Patient recently presented to the ED on 05/22/2021 with uncontrolled panic attacks  Patient requested crisis evaluation and mental health admission  She signed a 201 and was transferred from [facility] to [facility]   She was discharged to home on 06/03/2021  She tells me that this morning she feels quite well and much better compared to when she presented to the ED  She does admit to feeling a little confused this morning  She tells me that she went to take a nap yesterday evening but slept all the way through the night and woke up this morning thinking that it was yesterday night  As such, she took all of her scheduled nighttime medications this morning, including her trazodone and Seroquel  Additionally, she says that on hospital discharge she was prescribed Ativan 0 5 mg q 6 hours scheduled , to which she endorses compliance  She tells me she was only given a 2 week supply of this medication  When asked about outpatient follow-up with her mental health provider at [facility] and [facility], she initially was unsure but then later told me that she believes she has a follow-up appointment this upcoming Monday 6/14  Patient states she is taking her trazodone and Seroquel and indicates her buspirone was discontinued  She is unclear if she is still taking her Ernkerri Yee would like to speak with her mental health provider about possibly restarting this medication at a higher dose  Overall, patient's awareness and understanding of her medications is very poor  Patient tells me that she continues to have a hard time getting around and ambulating given her recent metatarsal fractures  She says she is still utilizing the boot provided at her most recent hospitalization from [address]    She says she has been utilizing her wheelchair at home but it is too large to enter her bathroom so she does also use her walker  She tells me that on discharge from [facility], she was and assist to and thinks her family is now having more trouble helping her get around  She seems unaware that she was supposed to schedule a follow-up appointment with Podiatry, but does tell me she has a follow-up with Orthopedics surgery scheduled for Monday  She also inquires about obtaining the phone number to schedule a follow-up visit with weight management  TCM Call (since 5/10/2021)     Hospital care reviewed  Records reviewed    Patient was hospitialized at  [facility]        Date of Admission  05/02/21    Date of discharge  05/17/21    Diagnosis  AMBULATORY DYSFUNCTION - R26 2    Disposition  Rehabilitation center ([facility])  [address] [place] [address] Ave  Call (since 5/10/2021)     I have advised the patient to call PCP with any new or worsening symptoms    Cathy Gómez LPN             Past Medical History:   Diagnosis Date    Acid reflux     Anxiety     RESOLVED: [id number]    Arthritis     Bipolar 2 disorder ([facility] [address] )     FOLLOWS WITH PSYCHIATRIST  CONTINUE LAMOTRIGINE; RESOLVED: [id number]    Depression     Familial tremor     both hands    Fibromyalgia     LAST ASSESSED: [id number]    Hearing aid worn     left ear    [place] (hard of hearing)     left ear    Hypertension     Left-sided weakness     Lower back pain     Memory loss of unknown cause     long and short term    Migraine     Obesity     Obesity, Class II, BMI 35-39 9     Overactive bladder     Panic attack     Post traumatic stress disorder     Seasonal allergies     Stroke [facility])     questionable stroke 2009    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    CONT ASA 81 MG DAILY; RESOLVED: [id number]    Urinary incontinence     Wears dentures     partial lower / full upper    Wears glasses        Past Surgical History:   Procedure Laterality Date    BACK SURGERY       SECTION      COLONOSCOPY      RESOLVED: [id number]    EAR SURGERY      EGD      HYSTERECTOMY      MYRINGOTOMY W/ TUBES Left     NECK SURGERY  2019    IA CYSTOURETHROSCOPY N/A 2016    Procedure: CYSTOSCOPY, BOTOX INJECTION;  Surgeon: Humaira Ceballos MD;  [facility];  Service: Gynecology    IA IMPLANT SPINAL NEUROSTIM/ Right 2/10/2021    Procedure: REPLACEMENT IMPLANTABLE PULSE GENERATOR DORSAL SPINAL COLUMN STIMULATOR, RIGHT;  Surgeon: Rich Gomes MD;  [facility]: [facility];  Service: Neurosurgery    IA PERCUT IMPLNT [address] Right 2020    Procedure: INSERTION THORACIC DORSAL COLUMN SPINAL CORD STIMULATOR PERCUTANEOUS W IMPLANTABLE PULSE GENERATOR, RIGHT;  Surgeon: Rich Gomes MD;  [facility];  Service: Neurosurgery    TONSILLECTOMY     [address]    UPPER GASTROINTESTINAL ENDOSCOPY  2020       Current Outpatient Medications   Medication Sig Dispense Refill    acetaminophen (TYLENOL) 325 mg tablet Take 2 tablets (650 mg total) by mouth every 8 (eight) hours as needed for mild pain 60 tablet 2    amLODIPine (NORVASC) 5 mg tablet Take 1 tablet (5 mg total) by mouth daily 90 tablet 3    bisacodyl (DULCOLAX) 5 mg EC tablet Take 5 mg by mouth daily as needed for constipation      cholecalciferol (VITAMIN D3) 1,000 units tablet Take 1 tablet (1,000 Units total) by mouth daily 90 tablet 5    CVS Vitamin C 500 MG tablet TAKE 1 TABLET BY MOUTH TWICE A DAY 60 tablet 0    Diapers & Supplies (Huggies Pull-Ups) MISC Use 3 (three) times a day 100 each 3    diclofenac sodium (VOLTAREN) 1 % APPLY 4 G TOPICALLY 4 (FOUR) TIMES A DAY (Patient taking differently: Apply 4 g topically 2 (two) times a day ) 100 g 0    DULoxetine (CYMBALTA) 60 mg delayed release capsule TAKE 1 CAPSULE BY MOUTH EVERY DAY 30 capsule 0    folic acid (FOLVITE) 1 mg tablet TAKE 1 TABLET BY MOUTH EVERY DAY 30 tablet 1  hydrOXYzine HCL (ATARAX) 50 mg tablet TAKE 1 TABLET (50 MG TOTAL) BY MOUTH DAILY AT BEDTIME AS NEEDED FOR ANXIETY (INSOMNIA) 90 tablet 0    LORazepam (ATIVAN) 0 5 mg tablet Take by mouth every 6 (six) hours      morphine (MS CONTIN) 15 mg 12 hr tablet Take 1 tablet (15 mg total) by mouth 2 (two) times a day Hold if sedatedMax Daily Amount: 30 mg 30 tablet 0    Mouthwashes (Biotene Dry Mouth) LIQD Apply 5 mL to the mouth or throat daily      Multiple Vitamins-Minerals (multivitamin with minerals) tablet Take 1 tablet by mouth daily 30 tablet 1    omeprazole (PriLOSEC) 20 mg delayed release capsule Take 1 capsule (20 mg total) by mouth daily 90 capsule 3    ondansetron (ZOFRAN-ODT) 4 mg disintegrating tablet Take 1 tablet (4 mg total) by mouth every 6 (six) hours as needed for nausea or vomiting 20 tablet 0    oxyCODONE (ROXICODONE) 5 mg immediate release tablet Take 1 tablet (5 mg total) by mouth dailyMax Daily Amount: 5 mg 15 tablet 0    Polyethylene Glycol 3350 (MIRALAX PO) Take by mouth      potassium chloride (MICRO-K) 10 MEQ CR capsule Take 10 mEq by mouth 2 (two) times a day Two tabs, two times daily      prazosin (MINIPRESS) 2 mg capsule TAKE 1 CAPSULE BY MOUTH EVERY DAY 30 capsule 1    pregabalin (LYRICA) 225 MG capsule TAKE 1 CAPSULE (225 MG TOTAL) BY MOUTH EVERY 12 (TWELVE) HOURS 60 capsule 0    traZODone (DESYREL) 100 mg tablet Take 100 mg by mouth daily at bedtime 2 tablets daily at bedtime      triamcinolone (KENALOG) 0 025 % cream Apply topically 2 (two) times a day 30 g 0    busPIRone (BUSPAR) 15 mg tablet Take 1 tablet (15 mg total) by mouth 3 (three) times a day (Patient not taking: Reported on 6/10/2021) 90 tablet 0    cetirizine (ZyrTEC) 10 mg tablet Take 10 mg by mouth as needed       ferrous sulfate 324 (65 Fe) mg TAKE 1 TABLET (324 MG TOTAL) BY MOUTH 2 (TWO) TIMES A DAY BEFORE MEALS (Patient not taking: Reported on 6/10/2021) 60 tablet 1    ipratropium-albuterol (DUO-NEB) 0 5-2 5 mg/3 mL nebulizer solution Take 1 vial (3 mL total) by nebulization every 6 (six) hours as needed for wheezing or shortness of breath (Patient not taking: Reported on 6/10/2021) 3 mL 2    lidocaine (LMX) 4 % cream Apply topically as needed for mild pain (Patient not taking: Reported on 5/21/2021) 30 g 0    naloxone (NARCAN) 4 mg/0 1 mL nasal spray Administer 1 spray into a nostril  If no response after 2-3 minutes, give another dose in the other nostril using a new spray  (Patient not taking: Reported on 6/10/2021) 1 each 1    nortriptyline (PAMELOR) 10 mg capsule Take 1 capsule (10 mg total) by mouth daily at bedtime 7 capsule 0    oxybutynin (DITROPAN) 5 mg tablet Take 1 tablet (5 mg total) by mouth 2 (two) times a day 180 tablet 3    QUEtiapine (SEROquel) 300 mg tablet Take 300 mg by mouth daily at bedtime      Respiratory Therapy Supplies (Nebulizer) YUDY Use as needed (Shortness of breath) (Patient not taking: Reported on 4/20/2021) 1 each 0    Sennosides (SENEXON PO) Take by mouth      SIMETHICONE PO Take by mouth as needed       sodium chloride (OCEAN) 0 65 % nasal spray 2 sprays into each nostril as needed      Valbenazine Tosylate (Ingrezza) 80 MG CAPS Take 80 mg by mouth daily 30 capsule 1     No current facility-administered medications for this visit  No Known Allergies    Review of Systems   Constitutional: Positive for fatigue  Negative for chills and fever  HENT: Negative for congestion, postnasal drip, rhinorrhea, sinus pressure, sinus pain and sore throat  Respiratory: Negative for cough, shortness of breath and wheezing  Cardiovascular: Negative for chest pain and palpitations  Gastrointestinal: Negative for abdominal pain, constipation, diarrhea, nausea and vomiting  Genitourinary: Negative for difficulty urinating, dysuria, frequency, hematuria and urgency  Musculoskeletal: Negative for arthralgias, back pain, gait problem and myalgias     Skin: Negative for pallor, rash and wound  Neurological: Negative for dizziness, weakness, light-headedness, numbness and headaches  Psychiatric/Behavioral: Positive for confusion  Negative for agitation, behavioral problems, dysphoric mood, hallucinations and suicidal ideas  The patient is not nervous/anxious and is not hyperactive  Vitals:         I spent 60 minutes directly with the patient during this visit    VIRTUAL VISIT DISCLAIMER    Samantha Ellis acknowledges that she has consented to an online visit or consultation  She understands that the online visit is based solely on information provided by her, and that, in the absence of a face-to-face physical evaluation by the physician, the diagnosis she receives is both limited and provisional in terms of accuracy and completeness  This is not intended to replace a full medical face-to-face evaluation by the physician  Samantha Ellis understands and accepts these terms

## 2021-06-10 NOTE — PROGRESS NOTES
Virtual Regular Visit      Assessment/Plan:    Problem List Items Addressed This Visit     None               Reason for visit is   Chief Complaint   Patient presents with    Transition of Care Management    Virtual Regular Visit        Encounter provider Parish Woodruff DO    Provider located at [address] [facility] [address]  [address] [facility] [address]   [facility] [address] [phone]      Recent Visits  No visits were found meeting these conditions  Showing recent visits within past 7 days and meeting all other requirements     Today's Visits  Date Type Provider Dept   06/10/21 Telemedicine [facility] today's visits and meeting all other requirements     Future Appointments  No visits were found meeting these conditions  Showing future appointments within next 150 days and meeting all other requirements        The patient was identified by name and date of birth  Rosy Vera was informed that this is a telemedicine visit and that the visit is being conducted through {AMB CORONAVIRUS VISIT ZSNUJV:59470}  {Telemedicine confidentiality :43044} {Telemedicine participants:16047}  She acknowledged consent and understanding of privacy and security of the video platform  The patient has agreed to participate and understands they can discontinue the visit at any time  Patient is aware this is a billable service  Subjective  Samantha Jordan Favorite is a 60 y o  female ***   Hard time getting around  Wheelchair too large for bathroom, using walker as well    Feeling better since DC  Using Ativan 0 5 mg q4hrs scheduled, got two weeks supply    Slept all the way through the night, that's why she was confused    Has OP psych at [address]  Waiting to schedule appt, need's transport  Will discuss with [place] [facility]  Is now an assist of 2? Has regular scheduled appt?  6/14    Just took all her nightly medication this AM because she thought it was nighttime  Will hold off on Ativan until this evening    Appt with ortho on Monday   Will need number for podiatry and weight management    NOVIDEO       Past Medical History:   Diagnosis Date    Acid reflux     Anxiety     RESOLVED: [id number]    Arthritis     Bipolar 2 disorder (HCC)     FOLLOWS WITH PSYCHIATRIST  CONTINUE LAMOTRIGINE; RESOLVED:     Depression     Familial tremor     both hands    Fibromyalgia     LAST ASSESSED: [id number]    Hearing aid worn     left ear    [place] (hard of hearing)     left ear    Hypertension     Left-sided weakness     Lower back pain     Memory loss of unknown cause     long and short term    Migraine     Obesity     Obesity, Class II, BMI 35-39 9     Overactive bladder     Panic attack     Post traumatic stress disorder     Seasonal allergies     Stroke [facility])     questionable stroke 2009    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    CONT ASA 81 MG DAILY; RESOLVED: [id number]    Urinary incontinence     Wears dentures     partial lower / full upper    Wears glasses        Past Surgical History:   Procedure Laterality Date    BACK SURGERY       SECTION      COLONOSCOPY      RESOLVED: [id number]    EAR SURGERY      EGD      HYSTERECTOMY      MYRINGOTOMY W/ TUBES Left     NECK SURGERY  2019    ID CYSTOURETHROSCOPY N/A 2016    Procedure: CYSTOSCOPY, BOTOX INJECTION;  Surgeon: Kirstie Hernandez MD;  [facility];  Service: Gynecology    ID IMPLANT SPINAL NEUROSTIM/ Right 2/10/2021    Procedure: REPLACEMENT IMPLANTABLE PULSE GENERATOR DORSAL SPINAL COLUMN STIMULATOR, RIGHT;  Surgeon: Chioma Wall MD;  [facility]: [facility];  Service: Neurosurgery    ID PERCUT IMPLNT [address] Right 2020    Procedure: INSERTION THORACIC DORSAL COLUMN SPINAL CORD STIMULATOR PERCUTANEOUS W IMPLANTABLE PULSE GENERATOR, RIGHT;  Surgeon: Chioma Wall MD;  [facility];  Service: Neurosurgery    TONSILLECTOMY      TUBAL LIGATION  1986    UPPER GASTROINTESTINAL ENDOSCOPY  09/2020       Current Outpatient Medications   Medication Sig Dispense Refill    acetaminophen (TYLENOL) 325 mg tablet Take 2 tablets (650 mg total) by mouth every 8 (eight) hours as needed for mild pain 60 tablet 2    amLODIPine (NORVASC) 5 mg tablet Take 1 tablet (5 mg total) by mouth daily 90 tablet 3    bisacodyl (DULCOLAX) 5 mg EC tablet Take 5 mg by mouth daily as needed for constipation      cholecalciferol (VITAMIN D3) 1,000 units tablet Take 1 tablet (1,000 Units total) by mouth daily 90 tablet 5    CVS Vitamin C 500 MG tablet TAKE 1 TABLET BY MOUTH TWICE A DAY 60 tablet 0    Diapers & Supplies (Huggies Pull-Ups) MISC Use 3 (three) times a day 100 each 3    diclofenac sodium (VOLTAREN) 1 % APPLY 4 G TOPICALLY 4 (FOUR) TIMES A DAY (Patient taking differently: Apply 4 g topically 2 (two) times a day ) 100 g 0    DULoxetine (CYMBALTA) 60 mg delayed release capsule TAKE 1 CAPSULE BY MOUTH EVERY DAY 30 capsule 0    folic acid (FOLVITE) 1 mg tablet TAKE 1 TABLET BY MOUTH EVERY DAY 30 tablet 1    hydrOXYzine HCL (ATARAX) 50 mg tablet TAKE 1 TABLET (50 MG TOTAL) BY MOUTH DAILY AT BEDTIME AS NEEDED FOR ANXIETY (INSOMNIA) 90 tablet 0    morphine (MS CONTIN) 15 mg 12 hr tablet Take 1 tablet (15 mg total) by mouth 2 (two) times a day Hold if sedatedMax Daily Amount: 30 mg 30 tablet 0    Mouthwashes (Biotene Dry Mouth) LIQD Apply 5 mL to the mouth or throat daily      Multiple Vitamins-Minerals (multivitamin with minerals) tablet Take 1 tablet by mouth daily 30 tablet 1    omeprazole (PriLOSEC) 20 mg delayed release capsule Take 1 capsule (20 mg total) by mouth daily 90 capsule 3    ondansetron (ZOFRAN-ODT) 4 mg disintegrating tablet Take 1 tablet (4 mg total) by mouth every 6 (six) hours as needed for nausea or vomiting 20 tablet 0    oxyCODONE (ROXICODONE) 5 mg immediate release tablet Take 1 tablet (5 mg total) by mouth dailyMax Daily Amount: 5 mg 15 tablet 0    Polyethylene Glycol 3350 (MIRALAX PO) Take by mouth      potassium chloride (MICRO-K) 10 MEQ CR capsule Take 10 mEq by mouth 2 (two) times a day Two tabs, two times daily      prazosin (MINIPRESS) 2 mg capsule TAKE 1 CAPSULE BY MOUTH EVERY DAY 30 capsule 1    pregabalin (LYRICA) 225 MG capsule TAKE 1 CAPSULE (225 MG TOTAL) BY MOUTH EVERY 12 (TWELVE) HOURS 60 capsule 0    traZODone (DESYREL) 100 mg tablet Take 100 mg by mouth daily at bedtime 2 tablets daily at bedtime      triamcinolone (KENALOG) 0 025 % cream Apply topically 2 (two) times a day 30 g 0    busPIRone (BUSPAR) 15 mg tablet Take 1 tablet (15 mg total) by mouth 3 (three) times a day (Patient not taking: Reported on 6/10/2021) 90 tablet 0    cetirizine (ZyrTEC) 10 mg tablet Take 10 mg by mouth as needed       ferrous sulfate 324 (65 Fe) mg TAKE 1 TABLET (324 MG TOTAL) BY MOUTH 2 (TWO) TIMES A DAY BEFORE MEALS (Patient not taking: Reported on 6/10/2021) 60 tablet 1    ipratropium-albuterol (DUO-NEB) 0 5-2 5 mg/3 mL nebulizer solution Take 1 vial (3 mL total) by nebulization every 6 (six) hours as needed for wheezing or shortness of breath (Patient not taking: Reported on 6/10/2021) 3 mL 2    lidocaine (LMX) 4 % cream Apply topically as needed for mild pain (Patient not taking: Reported on 5/21/2021) 30 g 0    naloxone (NARCAN) 4 mg/0 1 mL nasal spray Administer 1 spray into a nostril  If no response after 2-3 minutes, give another dose in the other nostril using a new spray   (Patient not taking: Reported on 6/10/2021) 1 each 1    nortriptyline (PAMELOR) 10 mg capsule Take 1 capsule (10 mg total) by mouth daily at bedtime 7 capsule 0    oxybutynin (DITROPAN) 5 mg tablet Take 1 tablet (5 mg total) by mouth 2 (two) times a day 180 tablet 3    QUEtiapine (SEROquel) 300 mg tablet Take 300 mg by mouth daily at bedtime      Respiratory Therapy Supplies (Nebulizer) YUDY Use as needed (Shortness of breath) (Patient not taking: Reported on 4/20/2021) 1 each 0    Sennosides (SENEXON PO) Take by mouth      SIMETHICONE PO Take by mouth as needed       sodium chloride (OCEAN) 0 65 % nasal spray 2 sprays into each nostril as needed      Valbenazine Tosylate (Ingrezza) 80 MG CAPS Take 80 mg by mouth daily 30 capsule 1     No current facility-administered medications for this visit  No Known Allergies    Review of Systems    Video Exam    Vitals:       Physical Exam     {covid time spent:59506}      VIRTUAL VISIT DISCLAIMER    Samantha Apple acknowledges that she has consented to an online visit or consultation  She understands that the online visit is based solely on information provided by her, and that, in the absence of a face-to-face physical evaluation by the physician, the diagnosis she receives is both limited and provisional in terms of accuracy and completeness  This is not intended to replace a full medical face-to-face evaluation by the physician  Samantha Apple understands and accepts these terms

## 2021-06-14 ENCOUNTER — HOSPITAL ENCOUNTER (OUTPATIENT)
Dept: RADIOLOGY | Facility: HOSPITAL | Age: 56
Discharge: HOME | End: 2021-06-14
Payer: COMMERCIAL

## 2021-06-14 ENCOUNTER — PATIENT OUTREACH (OUTPATIENT)
Dept: INTERNAL MEDICINE CLINIC | Facility: CLINIC | Age: 56
End: 2021-06-14

## 2021-06-14 ENCOUNTER — OFFICE VISIT (OUTPATIENT)
Dept: OBGYN CLINIC | Facility: HOSPITAL | Age: 56
End: 2021-06-14
Payer: COMMERCIAL

## 2021-06-14 VITALS
DIASTOLIC BLOOD PRESSURE: 75 MMHG | BODY MASS INDEX: 39.68 KG/M2 | HEIGHT: 61 IN | HEART RATE: 94 BPM | SYSTOLIC BLOOD PRESSURE: 107 MMHG

## 2021-06-14 DIAGNOSIS — S92.301G CLOSED DISPLACED FRACTURE OF METATARSAL BONE OF RIGHT FOOT WITH DELAYED HEALING, UNSPECIFIED METATARSAL, SUBSEQUENT ENCOUNTER: ICD-10-CM

## 2021-06-14 DIAGNOSIS — M17.0 PRIMARY OSTEOARTHRITIS OF BOTH KNEES: ICD-10-CM

## 2021-06-14 DIAGNOSIS — S92.301A MULTIPLE CLOSED FRACTURES OF METATARSAL BONE OF RIGHT FOOT, INITIAL ENCOUNTER: Primary | ICD-10-CM

## 2021-06-14 DIAGNOSIS — S92.301A MULTIPLE CLOSED FRACTURES OF METATARSAL BONE OF RIGHT FOOT, INITIAL ENCOUNTER: ICD-10-CM

## 2021-06-14 PROCEDURE — 20610 DRAIN/INJ JOINT/BURSA W/O US: CPT | Performed by: PHYSICIAN ASSISTANT

## 2021-06-14 PROCEDURE — 1036F TOBACCO NON-USER: CPT | Performed by: PHYSICIAN ASSISTANT

## 2021-06-14 PROCEDURE — 73630 X-RAY EXAM OF FOOT: CPT

## 2021-06-14 PROCEDURE — 3074F SYST BP LT 130 MM HG: CPT | Performed by: PHYSICIAN ASSISTANT

## 2021-06-14 PROCEDURE — 99214 OFFICE O/P EST MOD 30 MIN: CPT | Performed by: PHYSICIAN ASSISTANT

## 2021-06-14 PROCEDURE — 3078F DIAST BP <80 MM HG: CPT | Performed by: PHYSICIAN ASSISTANT

## 2021-06-14 RX ORDER — METHYLPREDNISOLONE ACETATE 40 MG/ML
1 INJECTION, SUSPENSION INTRA-ARTICULAR; INTRALESIONAL; INTRAMUSCULAR; SOFT TISSUE
Status: COMPLETED | OUTPATIENT
Start: 2021-06-14 | End: 2021-06-14

## 2021-06-14 RX ORDER — LIDOCAINE HYDROCHLORIDE 10 MG/ML
4 INJECTION, SOLUTION INFILTRATION; PERINEURAL
Status: COMPLETED | OUTPATIENT
Start: 2021-06-14 | End: 2021-06-14

## 2021-06-14 RX ADMIN — METHYLPREDNISOLONE ACETATE 1 ML: 40 INJECTION, SUSPENSION INTRA-ARTICULAR; INTRALESIONAL; INTRAMUSCULAR; SOFT TISSUE at 09:28

## 2021-06-14 RX ADMIN — LIDOCAINE HYDROCHLORIDE 4 ML: 10 INJECTION, SOLUTION INFILTRATION; PERINEURAL at 09:28

## 2021-06-14 NOTE — PROGRESS NOTES
Patient Name:  Mallika Hopkins  MRN:  [id number]    Assessment & Plan     Right 2nd through 4th metatarsal base fracture 5/1/21  Bilateral knee DJD  1  Weightbearing as tolerated right lower extremity and postoperative shoe  Patient may return to normal shoe wear once tolerating full weight without pain in the postoperative shoe  2  Offered referral to physical therapy  Patient declined  3  Corticosteroid injection performed today into the bilateral knees at patient's request     4  Follow-up as needed with Dr Parveen Wang, who she was seen for her knees in the past     Chief Complaint     Right foot pain, bilateral knee pain    History of the Present Illness     Samantha Monroe is a 60 y o  female who reports to the office today for evaluation of her right foot  She sustained an injury on 5/1/21 resulting in second through fourth metatarsal base fractures of the right foot  She has been in a cast   Currently she denies any significant pain  She denies any stiffness or weakness  No numbness or tingling  She also has a known history of bilateral knee DJD  She sees Dr Parveen Wang for this  She did undergo a bilateral knee corticosteroid injections performed by Dr Parveen Wang on 2/23/21 which provided significant relief up until recently  She notes generalized bilateral knee pain and swelling with stiffness  No fevers or chills    Physical Exam     /75   Pulse 94   Ht 5' 1" (1 549 m)   BMI 39 68 kg/m²     Right foot: Cast was removed  Skin intact  No erythema ecchymosis or swelling  No tenderness to palpation about the base of the second third and fourth metatarsals  Nearly full ankle range of motion without pain  Toes are warm and mobile  Bilateral knees:  Skin intact  Trace effusion  No joint line tenderness  Range of motion 0-120 without pain  Stable to varus and valgus stress  Stable Lachman test   Negative Boris's test     Eyes:  Anicteric sclerae  ENT:  Trachea midline    Lungs:  Normal respiratory effort  Cardiovascular:  Capillary refill is less than 2 seconds  Lymphatic:  No palpable lymphadenopathy  Skin:  Intact without erythema  Neurologic:  Sensation grossly intact to light touch  Psychiatric:  Mood and affect are appropriate  Data Review     I have personally reviewed pertinent films in PACS, and my interpretation follows:    X-rays right foot 21: Healed fractures at the bases of the second third and fourth metatarsals  Past Medical History:   Diagnosis Date    Acid reflux     Anxiety     RESOLVED: [id number]    Arthritis     Bipolar 2 disorder (HCC)     FOLLOWS WITH PSYCHIATRIST  CONTINUE LAMOTRIGINE; RESOLVED: [id number]    Depression     Familial tremor     both hands    Fibromyalgia     LAST ASSESSED: [id number]    Hearing aid worn     left ear    [place] (hard of hearing)     left ear    Hypertension     Left-sided weakness     Lower back pain     Memory loss of unknown cause     long and short term    Migraine     Obesity     Obesity, Class II, BMI 35-39 9     Overactive bladder     Panic attack     Post traumatic stress disorder     Seasonal allergies     Stroke [facility])     questionable stroke 2009    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    CONT ASA 81 MG DAILY; RESOLVED: [id number]    Urinary incontinence     Wears dentures     partial lower / full upper    Wears glasses        Past Surgical History:   Procedure Laterality Date    BACK SURGERY       SECTION      COLONOSCOPY      RESOLVED: [id number]    EAR SURGERY      EGD      HYSTERECTOMY      MYRINGOTOMY W/ TUBES Left     NECK SURGERY  2019    NM CYSTOURETHROSCOPY N/A 2016    Procedure: CYSTOSCOPY, BOTOX INJECTION;  Surgeon: Jaclyn London MD;  Location: [facility];  Service: Gynecology    NM IMPLANT SPINAL NEUROSTIM/ Right 2/10/2021    Procedure: REPLACEMENT IMPLANTABLE PULSE GENERATOR DORSAL SPINAL COLUMN STIMULATOR, RIGHT; Surgeon: Britney John MD;  [facility];  Service: Neurosurgery    ME PERCUT IMPLNT [address] Right 7/28/2020    Procedure: INSERTION THORACIC DORSAL COLUMN SPINAL CORD STIMULATOR PERCUTANEOUS W IMPLANTABLE PULSE GENERATOR, RIGHT;  Surgeon: Britney John MD;  [facility];  Service: Neurosurgery    TONSILLECTOMY     [address] [place]    UPPER GASTROINTESTINAL ENDOSCOPY  09/2020       No Known Allergies    Current Outpatient Medications on File Prior to Visit   Medication Sig Dispense Refill    acetaminophen (TYLENOL) 325 mg tablet Take 2 tablets (650 mg total) by mouth every 8 (eight) hours as needed for mild pain 60 tablet 2    amLODIPine (NORVASC) 5 mg tablet Take 1 tablet (5 mg total) by mouth daily 90 tablet 3    bisacodyl (DULCOLAX) 5 mg EC tablet Take 5 mg by mouth daily as needed for constipation      busPIRone (BUSPAR) 15 mg tablet Take 1 tablet (15 mg total) by mouth 3 (three) times a day (Patient not taking: Reported on 6/10/2021) 90 tablet 0    cetirizine (ZyrTEC) 10 mg tablet Take 10 mg by mouth as needed       cholecalciferol (VITAMIN D3) 1,000 units tablet Take 1 tablet (1,000 Units total) by mouth daily 90 tablet 5    [facility] Vitamin C 500 MG tablet TAKE 1 TABLET BY MOUTH TWICE A DAY 60 tablet 0    Diapers & Supplies (Huggies Pull-Ups) MISC Use 3 (three) times a day 100 each 3    diclofenac sodium (VOLTAREN) 1 % APPLY 4 G TOPICALLY 4 (FOUR) TIMES A DAY (Patient taking differently: Apply 4 g topically 2 (two) times a day ) 100 g 0    DULoxetine (CYMBALTA) 60 mg delayed release capsule TAKE 1 CAPSULE BY MOUTH EVERY DAY 30 capsule 0    ferrous sulfate 324 (65 Fe) mg TAKE 1 TABLET (324 MG TOTAL) BY MOUTH 2 (TWO) TIMES A DAY BEFORE MEALS (Patient not taking: Reported on 6/10/2021) 60 tablet 1    folic acid (FOLVITE) 1 mg tablet TAKE 1 TABLET BY MOUTH EVERY DAY 30 tablet 1    hydrOXYzine HCL (ATARAX) 50 mg tablet TAKE 1 TABLET (50 MG TOTAL) BY MOUTH DAILY AT BEDTIME AS NEEDED FOR ANXIETY (INSOMNIA) 90 tablet 0    ipratropium-albuterol (DUO-NEB) 0 5-2 5 mg/3 mL nebulizer solution Take 1 vial (3 mL total) by nebulization every 6 (six) hours as needed for wheezing or shortness of breath (Patient not taking: Reported on 6/10/2021) 3 mL 2    lidocaine (LMX) 4 % cream Apply topically as needed for mild pain (Patient not taking: Reported on 5/21/2021) 30 g 0    LORazepam (ATIVAN) 0 5 mg tablet Take by mouth every 6 (six) hours      morphine (MS CONTIN) 15 mg 12 hr tablet Take 1 tablet (15 mg total) by mouth 2 (two) times a day Hold if sedatedMax Daily Amount: 30 mg 30 tablet 0    Mouthwashes (Biotene Dry Mouth) LIQD Apply 5 mL to the mouth or throat daily      Multiple Vitamins-Minerals (multivitamin with minerals) tablet Take 1 tablet by mouth daily 30 tablet 1    naloxone (NARCAN) 4 mg/0 1 mL nasal spray Administer 1 spray into a nostril  If no response after 2-3 minutes, give another dose in the other nostril using a new spray   (Patient not taking: Reported on 6/10/2021) 1 each 1    nortriptyline (PAMELOR) 10 mg capsule Take 1 capsule (10 mg total) by mouth daily at bedtime 7 capsule 0    omeprazole (PriLOSEC) 20 mg delayed release capsule Take 1 capsule (20 mg total) by mouth daily 90 capsule 3    ondansetron (ZOFRAN-ODT) 4 mg disintegrating tablet Take 1 tablet (4 mg total) by mouth every 6 (six) hours as needed for nausea or vomiting 20 tablet 0    oxybutynin (DITROPAN) 5 mg tablet Take 1 tablet (5 mg total) by mouth 2 (two) times a day 180 tablet 3    oxyCODONE (ROXICODONE) 5 mg immediate release tablet Take 1 tablet (5 mg total) by mouth dailyMax Daily Amount: 5 mg 15 tablet 0    Polyethylene Glycol 3350 (MIRALAX PO) Take by mouth      potassium chloride (MICRO-K) 10 MEQ CR capsule Take 10 mEq by mouth 2 (two) times a day Two tabs, two times daily      prazosin (MINIPRESS) 2 mg capsule TAKE 1 CAPSULE BY MOUTH EVERY DAY 30 capsule 1    pregabalin (LYRICA) 225 MG capsule TAKE 1 CAPSULE (225 MG TOTAL) BY MOUTH EVERY 12 (TWELVE) HOURS 60 capsule 0    QUEtiapine (SEROquel) 300 mg tablet Take 300 mg by mouth daily at bedtime      Respiratory Therapy Supplies (Nebulizer) YUDY Use as needed (Shortness of breath) (Patient not taking: Reported on 4/20/2021) 1 each 0    Sennosides (SENEXON PO) Take by mouth      SIMETHICONE PO Take by mouth as needed       sodium chloride (OCEAN) 0 65 % nasal spray 2 sprays into each nostril as needed      traZODone (DESYREL) 100 mg tablet Take 100 mg by mouth daily at bedtime 2 tablets daily at bedtime      triamcinolone (KENALOG) 0 025 % cream Apply topically 2 (two) times a day 30 g 0    Valbenazine Tosylate (Ingrezza) 80 MG CAPS Take 80 mg by mouth daily 30 capsule 1     No current facility-administered medications on file prior to visit  Social History     Tobacco Use    Smoking status: Never Smoker    Smokeless tobacco: Never Used   Vaping Use    Vaping Use: Never used   Substance Use Topics    Alcohol use: Not Currently     Comment: 2 x year; being a social drinker as per all scripts     Drug use: No       Family History   Problem Relation Age of Onset    Colon cancer Mother     Alzheimer's disease Father     Stroke Father     Colon cancer Brother     Bipolar disorder Brother     Breast cancer Maternal Aunt     Colon cancer Maternal Aunt     Heart disease Other     Diabetes Other     Hypertension Other     Seizures Son     Depression Paternal Grandfather     No Known Problems Sister     No Known Problems Brother     Thyroid disease Neg Hx        Review of Systems     As stated in the HPI  All other systems were reviewed and are negative        Large joint arthrocentesis: bilateral knee  Procedure Details  Location: knee - bilateral knee  Needle size: 22 G  Ultrasound guidance: no  Approach: anterolateral    Medications (Right): 4 mL lidocaine 1 %; 1 mL methylPREDNISolone acetate 40 mg/mLMedications (Left): 4 mL lidocaine 1 %; 1 mL methylPREDNISolone acetate 40 mg/mL   Patient tolerance: patient tolerated the procedure well with no immediate complications  Dressing:  Sterile dressing applied

## 2021-06-14 NOTE — PROGRESS NOTES
[facility] had followed up with this patient as per Rodrigo Robertson [phone] in regards to [address] with [address] [place] [address] [facility]  SW had called the patient via phone  SWCM spoke with the patient's son who stated she was at an appt  SW provided number to patient's son so she can call back once she is back from her appt  SW will also be asking the patient if she had received her SS card so she can finish applying for housing with [address]  SW had called [address]  SW left a voicemail  SW will continue to be available  Addendum:    [facility] had received a call back from the patient  SWCM spoke with the patient via phone  SWCM asked the patient if she was able to reconnect with [place] [address] services  Patient stated she was able to connect with [address] and [facility] for services  Patient stated she sees Dr Marquise Live once a month  Patient stated she cannot remember her next follow up but it is on the calendar  SWCM asked the patient if she has obtained her SS card for housing  Patient stated she has yet to received the SS card  SW had reminded the patient about her appt tomorrow  Patient stated she may have to reschedule as she does not have transportation set up  Patient stated she forgot to call Maninder Al [phone] about it  SW reminded the patient she needs to call 3 days in advance  Patient verbalized understanding  [facility] to have front staff call to reschedule appt  SW notes patient has Dellar Girdwood as well  Patient stated she needs to pay money owed to Slade Normaner then she can continue to use services  Patient stated she does not have money to pay money owed  Patient stated she is unable to speak as she is in pain and needs to rest at this time  SW had received a call from Mookie Hernandez,  for [address] [facility] left a voicemail which stated the patient has reconnected with the clinic for services   [facility] continued to say in the voicemail patient was last seen on 6/7 after ED visit and follow up appt is for 7/9  SWCM routed this note to team  [facility] will continue to be available

## 2021-06-16 ENCOUNTER — TELEPHONE (OUTPATIENT)
Dept: INTERNAL MEDICINE CLINIC | Facility: CLINIC | Age: 56
End: 2021-06-16

## 2021-06-16 NOTE — TELEPHONE ENCOUNTER
Folder Color- ORANGE    Name of Form- [employer]    Form to be filled out by- Dr Howard Knutson    Form to be Faxed [phone]

## 2021-06-21 ENCOUNTER — PATIENT OUTREACH (OUTPATIENT)
Dept: INTERNAL MEDICINE CLINIC | Facility: CLINIC | Age: 56
End: 2021-06-21

## 2021-06-21 NOTE — PROGRESS NOTES
Received return phone call from patient  She reports she has been doing well  She reports she is reconnected with [facility]  [address] mental health clinic  She reports she will need to call them to find out when her next appointment  She reports they are prescribing her Ativan that she takes 4 times a day  Patient reports she feels this has been helping with her anxiety  Encouraged routine follow up with mental health  Patient saw ortho office on 6/14/21 for follow up of right foot fracture  Patient reports she is bearing weight on her right foot and has been wearing a regular sneaker  She reports at times will put post-op shoe on if she is having pain with her foot  Patient reports her knees are feeling better since her steroid injection she received in both knees at ortho office  Patient was offered physical therapy from ortho office and declined PT  Patient reports she is managing at this time and has the help of her son who she lives with  Patient reports she does feel she needs help with bathing  Patient reports he son took a leave of absence from his job and is due to return on 7/11/21  Patient is using her walker and wheelchair  I did discuss with patient about PA waiver program  Physician certification form was completed and patient was agreeable to PA waiver program in May  Patient is still agreeable but unsure if she or her son received any phone calls regarding waiver or regarding doing an assessment  Patient reports she would like her son to be a paid caregiver but would be open other help coming in based on how many hours she is approved for  , Mera Blood aware and will further follow up with SHAKILA cordova and son  Son was not with patient at this time and she reports he ran to the store  Patient is aware of weight management appointment 6/23 @ 8 am  Patient reports their office is going to be setting her up with [employer] rides   I did review with patient she has transportation with her [employer]  She reports they have been giving her a difficult time with scheduling because she uses a walker or wheelchair  I did remind patient she has Shara Schreiber  She reports she owes them $79 before she is able to schedule a ride  I did encourage patient to budget and work on paying that as future medical trips will be covered with her medical assistance  Patient reports she is paying [employer] $50 a month and setup with a payment plan for equipment she received last year so she could receive other equipment and supplies from them  Patient reports she will speak with her daughter and see if she has her vehicle back to take her and if not will be rescheduling appointment  Son does not have a vehicle  I did review with patient upcoming appointment with PCP office on 7/15 @ 10:30 am and to bring all medication with her to that visit  I tried review medication with patient and she did not have it by her at the time and reports she has everything at this time  Patient does not have any further questions, concerns, or other needs at this time  Pt has my contact # and PCP office # if needed  Pt is agreeable for further outreach

## 2021-06-21 NOTE — PROGRESS NOTES
SW spoke with Liss Fernandez RN/CM who has spoken with pt this date  She has confirmed to vita that she is f/u with [address] at [address] [facility]  Pt has not f/u with the PA waiver Program which as been previously started  Pt has shared she would like her son to be her paid care giver  SW will f/u with pt/son/ PA Waiver program re same

## 2021-06-21 NOTE — PROGRESS NOTES
Outpatient Care Management Note:    Called patient, no answer  Message left this is the nurse France King calling with her primary care doctor's office to follow up with her to see how she is feeling at this time, review medication, and upcoming appointments with her and to see if she has any questions or concerns at this time   I did request a call back at [phone] and that I am available M-F 8-4:30 pm

## 2021-06-21 NOTE — PROGRESS NOTES
CHW left message for patient to call me back to see if she has received her Social Security Card  Application was mailed out on 05/198/2021 for a new [address]

## 2021-06-22 ENCOUNTER — PATIENT OUTREACH (OUTPATIENT)
Dept: INTERNAL MEDICINE CLINIC | Facility: CLINIC | Age: 56
End: 2021-06-22

## 2021-06-22 NOTE — PROGRESS NOTES
SW attempted to reach pt son Miki Lauren this date to f/u with pt/famiy re the PA Waiver Program but SW had to leave a message  SW also attempted to reach Mr Kenya De León at [address] to verify pt has resume tx as to see if pt will receive as ICM but had to leave a message  SW did receive a return call from Mr Stella Cortes who did confirm pt has re-established with their [place] [address] clinic  She saw her Provider Dr Prachi Villar on 6/7/21 and is scheduled to see Dr Jason Forde of their Residency Program on 7/19/21 for F/U  He further relates pt has been referred to [facility] for an ICM and their Wellness Team  Mr Stella Cortes will f/u on these referrals  SW/ Cm to f/u with pt / family re PA Waiver as assist as indicted

## 2021-06-23 ENCOUNTER — OFFICE VISIT (OUTPATIENT)
Dept: BARIATRICS | Facility: CLINIC | Age: 56
End: 2021-06-23

## 2021-06-23 VITALS
BODY MASS INDEX: 39.03 KG/M2 | TEMPERATURE: 96.6 F | DIASTOLIC BLOOD PRESSURE: 78 MMHG | SYSTOLIC BLOOD PRESSURE: 138 MMHG | HEIGHT: 61 IN | HEART RATE: 111 BPM | RESPIRATION RATE: 17 BRPM | WEIGHT: 206.7 LBS

## 2021-06-23 DIAGNOSIS — E66.9 OBESITY, CLASS II, BMI 35-39.9: Primary | ICD-10-CM

## 2021-06-23 PROCEDURE — RECHECK

## 2021-06-23 PROCEDURE — 3008F BODY MASS INDEX DOCD: CPT | Performed by: PHYSICIAN ASSISTANT

## 2021-06-23 NOTE — PROGRESS NOTES
Bariatric Nutrition Assessment Note    Type of surgery    Preop  Surgery Date: TBD  Surgeon: Dr Gwen Nelson  60 y o   female     Wt with BMI of 25: 132 lbs  Pre-Op Excess Wt: 74 lbs  Blood pressure 138/78, pulse (!) 111, temperature (!) 96 6 °F (35 9 °C), temperature source Tympanic, resp  rate 17, height 5' 1" (1 549 m), weight 93 8 kg (206 lb 11 2 oz), not currently breastfeeding  Body mass index is 39 06 kg/m²  Weight History   Onset of Obesity: Adult  Family history of obesity: No  Wt Loss Attempts: Pema Sites, vegan diet  Maximum Wt Lost: 70 lbs (questionable accuracy)    Review of History and Medications   Past Medical History:   Diagnosis Date    Acid reflux     Anxiety     RESOLVED: [id number]    Arthritis     Bipolar 2 disorder (HCC)     FOLLOWS WITH PSYCHIATRIST  CONTINUE LAMOTRIGINE; RESOLVED: [id number]    Depression     Familial tremor     both hands    Fibromyalgia     LAST ASSESSED: [id number]    Hearing aid worn     left ear    [place] (hard of hearing)     left ear    Hypertension     Left-sided weakness     Lower back pain     Memory loss of unknown cause     long and short term    Migraine     Obesity     Obesity, Class II, BMI 35-39 9     Overactive bladder     Panic attack     Post traumatic stress disorder     Seasonal allergies     Stroke [facility])     questionable stroke 2009    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    CONT ASA 81 MG DAILY; RESOLVED: [id number]    Urinary incontinence     Wears dentures     partial lower / full upper    Wears glasses      Past Surgical History:   Procedure Laterality Date    BACK SURGERY       SECTION      COLONOSCOPY      RESOLVED: 25PSQ2011    EAR SURGERY      EGD      HYSTERECTOMY      MYRINGOTOMY W/ TUBES Left     NECK SURGERY  2019    W[place] CYSTOURETHROSCOPY N/A 2016    Procedure: CYSTOSCOPY, BOTOX INJECTION;  Surgeon: Bob More MD; Location: [facility];  Service: Gynecology    [place] IMPLANT SPINAL NEUROSTIM/ Right 2/10/2021    Procedure: REPLACEMENT IMPLANTABLE PULSE GENERATOR DORSAL SPINAL COLUMN STIMULATOR, RIGHT;  Surgeon: Sabrina Cerna MD;  [facility];  Service: Neurosurgery    NY PERCUT IMPLNT [address] Right 7/28/2020    Procedure: INSERTION THORACIC DORSAL COLUMN SPINAL CORD STIMULATOR PERCUTANEOUS W IMPLANTABLE PULSE GENERATOR, RIGHT;  Surgeon: Sabrina Cerna MD;  [facility];  Service: Neurosurgery    TONSILLECTOMY      TUBAL LIGATION  1986    UPPER GASTROINTESTINAL ENDOSCOPY  09/2020     Social History     Socioeconomic History    Marital status:      Spouse name: None    Number of children: 2    Years of education: graduate school     Highest education level: None   Occupational History    Occupation: Disabled   Tobacco Use    Smoking status: Never Smoker    Smokeless tobacco: Never Used   Vaping Use    Vaping Use: Never used   Substance and Sexual Activity    Alcohol use: Not Currently     Comment: 2 x year; being a social drinker as per all scripts     Drug use: No    Sexual activity: Not Currently   Other Topics Concern    None   Social History Narrative    Bereavement    Daily caffeine consumption, 6-8 servings per day     as per all scripts    Lives in [address] [place] [address] Determinants of Health     Financial Resource Strain: Medium Risk    Difficulty of Paying Living Expenses: Somewhat hard   Food Insecurity: No Food Insecurity    Worried About [facility] in the Last Year: Never true    [facility] in the Last Year: Never true   Transportation Needs: No Transportation Needs    Lack of Transportation (Medical): No    Lack of Transportation (Non-Medical): No   Physical Activity: Inactive    Days of Exercise per Week: 0 days    Minutes of Exercise per Session: 0 min   Stress:     Feeling of Stress :    Social Connections:  Moderately Isolated  Frequency of Communication with Friends and Family: More than three times a week    Frequency of Social Gatherings with Friends and Family: More than three times a week    Attends Roman [employer] Services: More than 4 times per year    Active Member of [employer] Group or Organizations: No    Attends Club or Organization Meetings: Never    Marital Status:    Intimate Partner Violence: Not At Risk    Fear of Current or Ex-Partner: No    Emotionally Abused: No    Physically Abused: No    Sexually Abused: No       Current Outpatient Medications:     acetaminophen (TYLENOL) 325 mg tablet, Take 2 tablets (650 mg total) by mouth every 8 (eight) hours as needed for mild pain, Disp: 60 tablet, Rfl: 2    amLODIPine (NORVASC) 5 mg tablet, Take 1 tablet (5 mg total) by mouth daily, Disp: 90 tablet, Rfl: 3    bisacodyl (DULCOLAX) 5 mg EC tablet, Take 5 mg by mouth daily as needed for constipation, Disp: , Rfl:     cetirizine (ZyrTEC) 10 mg tablet, Take 10 mg by mouth as needed , Disp: , Rfl:     cholecalciferol (VITAMIN D3) 1,000 units tablet, Take 1 tablet (1,000 Units total) by mouth daily, Disp: 90 tablet, Rfl: 5    [employer] Vitamin C 500 MG tablet, TAKE 1 TABLET BY MOUTH TWICE A DAY, Disp: 60 tablet, Rfl: 0    Diapers & Supplies (Huggies Pull-Ups) MISC, Use 3 (three) times a day, Disp: 100 each, Rfl: 3    diclofenac sodium (VOLTAREN) 1 %, APPLY 4 G TOPICALLY 4 (FOUR) TIMES A DAY (Patient taking differently: Apply 4 g topically 2 (two) times a day ), Disp: 100 g, Rfl: 0    DULoxetine (CYMBALTA) 60 mg delayed release capsule, TAKE 1 CAPSULE BY MOUTH EVERY DAY, Disp: 30 capsule, Rfl: 0    folic acid (FOLVITE) 1 mg tablet, TAKE 1 TABLET BY MOUTH EVERY DAY, Disp: 30 tablet, Rfl: 1    hydrOXYzine HCL (ATARAX) 50 mg tablet, TAKE 1 TABLET (50 MG TOTAL) BY MOUTH DAILY AT BEDTIME AS NEEDED FOR ANXIETY (INSOMNIA), Disp: 90 tablet, Rfl: 0    LORazepam (ATIVAN) 0 5 mg tablet, Take by mouth every 6 (six) hours, Disp: , Rfl:   morphine (MS CONTIN) 15 mg 12 hr tablet, Take 1 tablet (15 mg total) by mouth 2 (two) times a day Hold if sedatedMax Daily Amount: 30 mg, Disp: 30 tablet, Rfl: 0    Mouthwashes (Biotene Dry Mouth) LIQD, Apply 5 mL to the mouth or throat daily, Disp: , Rfl:     Multiple Vitamins-Minerals (multivitamin with minerals) tablet, Take 1 tablet by mouth daily, Disp: 30 tablet, Rfl: 1    nortriptyline (PAMELOR) 10 mg capsule, Take 1 capsule (10 mg total) by mouth daily at bedtime, Disp: 7 capsule, Rfl: 0    ondansetron (ZOFRAN-ODT) 4 mg disintegrating tablet, Take 1 tablet (4 mg total) by mouth every 6 (six) hours as needed for nausea or vomiting, Disp: 20 tablet, Rfl: 0    oxyCODONE (ROXICODONE) 5 mg immediate release tablet, Take 1 tablet (5 mg total) by mouth dailyMax Daily Amount: 5 mg, Disp: 15 tablet, Rfl: 0    Polyethylene Glycol 3350 (MIRALAX PO), Take by mouth, Disp: , Rfl:     potassium chloride (MICRO-K) 10 MEQ CR capsule, Take 10 mEq by mouth 2 (two) times a day Two tabs, two times daily, Disp: , Rfl:     prazosin (MINIPRESS) 2 mg capsule, TAKE 1 CAPSULE BY MOUTH EVERY DAY, Disp: 30 capsule, Rfl: 1    pregabalin (LYRICA) 225 MG capsule, TAKE 1 CAPSULE (225 MG TOTAL) BY MOUTH EVERY 12 (TWELVE) HOURS, Disp: 60 capsule, Rfl: 0    QUEtiapine (SEROquel) 300 mg tablet, Take 300 mg by mouth daily at bedtime, Disp: , Rfl:     Sennosides (SENEXON PO), Take by mouth, Disp: , Rfl:     SIMETHICONE PO, Take by mouth as needed , Disp: , Rfl:     sodium chloride (OCEAN) 0 65 % nasal spray, 2 sprays into each nostril as needed, Disp: , Rfl:     traZODone (DESYREL) 100 mg tablet, Take 100 mg by mouth daily at bedtime 2 tablets daily at bedtime, Disp: , Rfl:     triamcinolone (KENALOG) 0 025 % cream, Apply topically 2 (two) times a day, Disp: 30 g, Rfl: 0    Valbenazine Tosylate (Ingrezza) 80 MG CAPS, Take 80 mg by mouth daily, Disp: 30 capsule, Rfl: 1    busPIRone (BUSPAR) 15 mg tablet, Take 1 tablet (15 mg total) by mouth 3 (three) times a day (Patient not taking: Reported on 6/23/2021), Disp: 90 tablet, Rfl: 0    ferrous sulfate 324 (65 Fe) mg, TAKE 1 TABLET (324 MG TOTAL) BY MOUTH 2 (TWO) TIMES A DAY BEFORE MEALS (Patient not taking: Reported on 6/10/2021), Disp: 60 tablet, Rfl: 1    ipratropium-albuterol (DUO-NEB) 0 5-2 5 mg/3 mL nebulizer solution, Take 1 vial (3 mL total) by nebulization every 6 (six) hours as needed for wheezing or shortness of breath (Patient not taking: Reported on 6/10/2021), Disp: 3 mL, Rfl: 2    lidocaine (LMX) 4 % cream, Apply topically as needed for mild pain (Patient not taking: Reported on 5/21/2021), Disp: 30 g, Rfl: 0    naloxone (NARCAN) 4 mg/0 1 mL nasal spray, Administer 1 spray into a nostril  If no response after 2-3 minutes, give another dose in the other nostril using a new spray   (Patient not taking: Reported on 6/10/2021), Disp: 1 each, Rfl: 1    omeprazole (PriLOSEC) 20 mg delayed release capsule, Take 1 capsule (20 mg total) by mouth daily, Disp: 90 capsule, Rfl: 3    oxybutynin (DITROPAN) 5 mg tablet, Take 1 tablet (5 mg total) by mouth 2 (two) times a day, Disp: 180 tablet, Rfl: 3    Respiratory Therapy Supplies (Nebulizer) YUDY, Use as needed (Shortness of breath) (Patient not taking: Reported on 4/20/2021), Disp: 1 each, Rfl: 0  Food Intake and Lifestyle Assessment   Food Intake Assessment completed via usual diet recall  Breakfast: oatmeal, or cheese and sausage bagel, or croissant with cream cheese or cold cereal, banana  Snack: 0   Lunch: frozen chicken pot pie  Snack: 0  Dinner: son cooks-eggs or sausage, pork chop and broccoli, corn  Snack: potato chips   Beverage intake: water and sweetened beverages  Protein supplement: 0  Estimated protein intake per day: 50 gm  Estimated fluid intake per day: 60 oz  Meals eaten away from home: just cut out eating out  Typical meal pattern: 3 meals per day and 1 snacks per day  Eating Behaviors: Consumption of high calorie/ high fat foods and Large portion sizes  Food allergies or intolerances: No Known Allergies  Cultural or Islam considerations: N/A    Physical Assessment  Physical Activity  Types of exercise: continues to do PT exercises for 10 minutes daily  Current physical limitations: recent broken ankle    Psychosocial Assessment   Support systems: children  Socioeconomic factors: disabled, lives with son    Nutrition Diagnosis  Diagnosis: Overweight / Obesity (NC-3 3)  Related to: Physical inactivity and Excessive energy intake  As Evidenced by: BMI >25     Nutrition Prescription: Recommend the following diet  Low fat, Low sugar and High protein    Interventions and Teaching   Discussed pre-op and post-op nutrition guidelines  Patient educated and handouts provided    Surgical changes to stomach / GI  Capacity of post-surgery stomach  Diet progression  Adequate hydration  Sugar and fat restriction to decrease "dumping syndrome"  Fat restriction to decrease steatorrhea  Expected weight loss  Weight loss plateaus/ possibility of weight regain  Exercise  Suggestions for pre-op diet  Nutrition considerations after surgery  Protein supplements  Meal planning and preparation  Appropriate carbohydrate, protein, and fat intake, and food/fluid choices to maximize safe weight loss, nutrient intake, and tolerance   Dietary and lifestyle changes  Possible problems with poor eating habits  Intuitive eating  Techniques for self monitoring and keeping daily food journal  Potential for food intolerance after surgery, and ways to deal with them including: lactose intolerance, nausea, reflux, vomiting, diarrhea, food intolerance, appetite changes, gas  Vitamin / Mineral supplementation of Multivitamin with minerals and Vitamin D    Education provided to: patient    Barriers to learning: No barriers identified  Readiness to change: preparation    Prior research on procedure: discussed with provider    Comprehension: verbalizes understanding     Expected Compliance: good  Recommendations  Pt is an appropriate candidate for surgery   Yes  Evaluation / Monitoring  Dietitian to Monitor: Eating pattern as discussed Body weight Lab values Physical activity    Goals  Exercise 30 minutes 5 times per week, Complete lession plans 1-6 and Eat 3 meals per day    Time Spent:   1 Hour

## 2021-06-23 NOTE — PROGRESS NOTES
Bariatric Behavioral Health Evaluation    Presenting Problem:  Samantha Gould  is a 60 y o    female    :  1965   Patient presented with overall concerns of obesity  Stated that weight has impacted quality of life and concerned with lack of mobility, chronic pain, and overall health  Has attempted various weight loss plans in the past    Patient is Interested in exploring bariatric surgery as an option for  weight loss goals to improve health, increase activity and prevent family disease  Is the patient seeking Bariatric Surgery Eval? Yes    Realizes Post- Op Requirements? Yes   Pre-morbid level of function and history of present illness: Patient has health issues  Psychiatric/Psychological Treatment Diagnosis: Positive for Axis I Diagnosis of Anxiety and depression  Psychiatrist prescribes medications at this time  Symptoms are stable at this time  Encouraged to discuss medication with practitioner post surgery  Psychiatrist Yes  Dr Violette Whalen with [address]  Psychiatrist Phone [phone]    Have you had Inpatient Treatment? Yes in  for 72 hours for depression  Followed with Partial Hospitalization  Recent Hospitalization at [facility] after referral from Crisis intervention in [place]  1 week stay for medication adjustment, voluntary admission  Outpatient Counselor No     Tobacco History: No    Drug and/or Alcohol treatment history: Denied          Additional comments/stressors related to family/relationships/peer support:    son supportive to patient's weight loss journey        Physical/Psychological Assessment:     Appearance: appropriate  Sociability: average  Affect: appropriate  Mood: calm  Thought Process: slow  Speech: normal  Content: no impairment  Orientation: person  Yes , place  Yes , time  Yes , normal attention span  Yes , impaired  memory  Yes   and normal judgement  Yes   Insight: emotional  fair    Risk Assessment: No thoughts of self harm, suicide or homicide  Risk of Harm to Self or Others:   Low  Observation:  this interview only    Access to weapons : None      Recommendations: Recommended for surgery  no     Note :  Patient presented for behavioral health evaluation for the bariatric program    Positive for  Mental Health diagnoses  No outpatient  Therapy  Involved with  Psychiatry: Dr Pedro Pablo Rivers at [facility]  Recent Hospitalization at [facility] after referral from Crisis intervention in [facility]  1 week stay for medication adjustment, voluntary admission  Mediations adjusted and follow up visit with Dr Pedro Pablo Rivers 3 weeks ago he adjusted medications  Tartive Dyskinesia evident  Difficulty swallowing  Negative for  Drug and/or Alcohol abuse or treatment  Patient does not meet criteria for surgery at this time  Appointment set for starting process in 6 months to allow a one year to pass without in patient treatment  Alisha Montgomery, MSW, LCSW  _____________________________      BARIATRIC SURGERY EDUCATION CHECKLIST     I have received education related to my bariatric surgery process and understand:     Patients may be required to complete a psychiatric evaluation and receive clearance for surgery from their psychiatrist      Patients who undergo weight loss surgery are at higher risk of increased mental health concerns and suicide attempts  Patients may be required to complete a full substance abuse evaluation and then complete all treatment recommendations prior to surgery  If diagnosis of abuse/dependence results, patient may be required to remain sober for one (1) year before having bariatric surgery  Patients on psychiatric medications should check with their provider to discuss psychiatric medications and the changes in absorption  Patient should discuss all time release medications with provider and take all medications as prescribed       The recommendation is that there is no use of  any tobacco products, Hookah or  vapes for the bariatric post-operation patient  Bariatric surgery patients should not consume alcohol as a post-operative patient as it may increase risk of numerous health conditions including but not limited to alcohol abuse and ulcers  There is a possibility of weight regain if patient does not follow all program guidelines and recommendations  Bariatric surgery patients should exercise thirty (30) to sixty (60) minutes per day to maintain post-surgical weight loss  Research indicates that bariatric patients are more successful when they see a therapist for up to two (2) years post-op  Patients will follow all medical and dietary recommendations provided  Patient will keep all scheduled appointments and follow up with their physician for a minimum of five (5) years  Patient will take all vitamins as recommended  Post-operative vitamins are life-long  Patient reviewed Bariatric Surgery Education Checklist and agrees they have received education on these issues

## 2021-06-24 ENCOUNTER — PATIENT OUTREACH (OUTPATIENT)
Dept: INTERNAL MEDICINE CLINIC | Facility: CLINIC | Age: 56
End: 2021-06-24

## 2021-06-24 NOTE — PROGRESS NOTES
CHW received a call from patient to let me know that she has received her Social Security Card  CHW will be obtaining the Social Security Card today to give it to [facility] for application to be completed  Pt is on Wait list at [facility]  CHW along with [address] called [facility] to see if the assessment had been done  Per the Representative the assessment with [facility] was done on 06/08/2021  And now [facility] will be calling patient  [address] spoke to patient about how the Waiver works and the benefits to [employer]  CHW and pt called [facility] and the representative stated that [facility] had already reach out to patient   is Shobha Vigil at [phone]  CHW and pt left a message for Shobha Vigil  Pt told the representative at [facility] that patient does not reside in [facility] that she resides in [address] [place] [address]  She insisted that it was [facility] calling patient  CHW will continue to follow up with pt and communicate w/ team      Addendum:   CHW met with patient at her home to get a copy of the social security card  Her son answered the door for me  CHW told son how he can be the care taker for his mom though the Waiver program and he stated he may not want the Waiver  CHW told patient Serge Foreman will be speaking with son and explain more information on the Waiver and the benefits

## 2021-06-24 NOTE — PROGRESS NOTES
SW received call from [facility] and a 3 way call was made with pt  SW has confirmed with pt that she is still interested in the [place]   Program    Another 3 way call was made to the [address] [place] [address] and they report that their assessment was completed 6/8/21and they have the [address]   They had made a request to [facility] to contact pt but SW corrected tehn the pt is now in [place]    They have resent the request to [place] [phone] to contact pt to do their Level of care assessment  They suggust calling [facility] if the pt does not hear anything from them soon  [facility] has offered to help make call to Krishna Fuentes to help set up the appointment  She will notify son of same in case he can be with pt during their assessment      SW/RN/CHW to remain available to assist as incited,

## 2021-06-25 ENCOUNTER — TELEPHONE (OUTPATIENT)
Dept: INTERNAL MEDICINE CLINIC | Facility: CLINIC | Age: 56
End: 2021-06-25

## 2021-06-25 DIAGNOSIS — M54.16 LUMBAR RADICULOPATHY: ICD-10-CM

## 2021-06-25 DIAGNOSIS — G89.4 CHRONIC PAIN DISORDER: ICD-10-CM

## 2021-06-25 RX ORDER — OXYCODONE HYDROCHLORIDE 5 MG/1
5 TABLET ORAL DAILY
Qty: 15 TABLET | Refills: 0 | Status: ON HOLD | OUTPATIENT
Start: 2021-06-25 | End: 2021-07-14 | Stop reason: CLARIF

## 2021-06-25 RX ORDER — MORPHINE SULFATE 15 MG/1
15 TABLET, FILM COATED, EXTENDED RELEASE ORAL 2 TIMES DAILY
Qty: 30 TABLET | Refills: 0 | Status: ON HOLD | OUTPATIENT
Start: 2021-06-25 | End: 2021-07-14 | Stop reason: CLARIF

## 2021-06-25 NOTE — TELEPHONE ENCOUNTER
Please see request below  Patient has been prescribed this in the past, we are just unable to link it to this message for an easy re-order  Please refill if appropriate

## 2021-06-25 NOTE — TELEPHONE ENCOUNTER
Name of medication, dose, quantity and frequency  Requested Prescriptions     Pending Prescriptions Disp Refills    oxyCODONE (ROXICODONE) 5 mg immediate release tablet 15 tablet 0     Sig: Take 1 tablet (5 mg total) by mouth dailyMax Daily Amount: 5 mg     Number of refills left:0    Amount of medication left:0    Pharmacy verified and updated:  Yes    Additional information:

## 2021-06-25 NOTE — TELEPHONE ENCOUNTER
diclofenac sodium (VOLTAREN) 1 %  Patient called to request this medication but the computer is advising that it's no longer available  Which alternative medication will be prescribed?     Please call patient

## 2021-06-25 NOTE — TELEPHONE ENCOUNTER
Name of medication, dose, quantity and frequency    Requested Prescriptions     Pending Prescriptions Disp Refills    morphine (MS CONTIN) 15 mg 12 hr tablet 30 tablet 0     Sig: Take 1 tablet (15 mg total) by mouth 2 (two) times a day Hold if sedatedMax Daily Amount: 30 mg     Number of refills left:  0    Amount of medication left:  2    Pharmacy verified and updated    Additional information:

## 2021-06-25 NOTE — TELEPHONE ENCOUNTER
Patient made aware script sent to the pharmacy  Also advised she must come to her scheduled appt on 7/15/21 and she must bring in her Oxycodone and her Morphine bottles with the pills in it   She will need a UDS, contract and pill count

## 2021-06-27 ENCOUNTER — APPOINTMENT (EMERGENCY)
Dept: RADIOLOGY | Facility: HOSPITAL | Age: 56
DRG: 092 | End: 2021-06-27
Payer: COMMERCIAL

## 2021-06-27 ENCOUNTER — HOSPITAL ENCOUNTER (INPATIENT)
Facility: HOSPITAL | Age: 56
LOS: 10 days | Discharge: HOME HEALTH | DRG: 092 | End: 2021-07-07
Attending: EMERGENCY MEDICINE | Admitting: INTERNAL MEDICINE
Payer: COMMERCIAL

## 2021-06-27 DIAGNOSIS — M54.16 LUMBAR RADICULOPATHY: ICD-10-CM

## 2021-06-27 DIAGNOSIS — M79.7 FIBROMYALGIA: ICD-10-CM

## 2021-06-27 DIAGNOSIS — R11.0 NAUSEA: ICD-10-CM

## 2021-06-27 DIAGNOSIS — M54.41 CHRONIC BILATERAL LOW BACK PAIN WITH BILATERAL SCIATICA: ICD-10-CM

## 2021-06-27 DIAGNOSIS — G24.01 TARDIVE DYSKINESIA: ICD-10-CM

## 2021-06-27 DIAGNOSIS — F41.1 GENERALIZED ANXIETY DISORDER: ICD-10-CM

## 2021-06-27 DIAGNOSIS — G89.29 CHRONIC BILATERAL LOW BACK PAIN WITH BILATERAL SCIATICA: ICD-10-CM

## 2021-06-27 DIAGNOSIS — F31.81 BIPOLAR II DISORDER (HCC): ICD-10-CM

## 2021-06-27 DIAGNOSIS — F41.9 ANXIETY: ICD-10-CM

## 2021-06-27 DIAGNOSIS — M54.42 CHRONIC BILATERAL LOW BACK PAIN WITH BILATERAL SCIATICA: ICD-10-CM

## 2021-06-27 DIAGNOSIS — R41.82 ALTERED MENTAL STATUS: Primary | ICD-10-CM

## 2021-06-27 DIAGNOSIS — G89.4 CHRONIC PAIN SYNDROME: ICD-10-CM

## 2021-06-27 DIAGNOSIS — G93.40 ENCEPHALOPATHY: ICD-10-CM

## 2021-06-27 DIAGNOSIS — M47.816 LUMBAR SPONDYLOSIS: ICD-10-CM

## 2021-06-27 DIAGNOSIS — E87.6 HYPOKALEMIA: ICD-10-CM

## 2021-06-27 DIAGNOSIS — G89.4 CHRONIC PAIN DISORDER: ICD-10-CM

## 2021-06-27 PROBLEM — R47.01 APHASIA: Status: RESOLVED | Noted: 2021-06-27 | Resolved: 2021-06-27

## 2021-06-27 PROBLEM — R47.01 APHASIA: Status: ACTIVE | Noted: 2021-06-27

## 2021-06-27 LAB
ALBUMIN SERPL BCP-MCNC: 4.1 G/DL (ref 3.5–5)
ALP SERPL-CCNC: 120 U/L (ref 46–116)
ALT SERPL W P-5'-P-CCNC: 36 U/L (ref 12–78)
AMMONIA PLAS-SCNC: <10 UMOL/L (ref 11–35)
AMPHETAMINES SERPL QL SCN: NEGATIVE
ANION GAP SERPL CALCULATED.3IONS-SCNC: 10 MMOL/L (ref 4–13)
APAP SERPL-MCNC: <2 UG/ML (ref 10–20)
APTT PPP: 30 SECONDS (ref 23–37)
AST SERPL W P-5'-P-CCNC: 25 U/L (ref 5–45)
ATRIAL RATE: 135 BPM
ATRIAL RATE: 64 BPM
BACTERIA UR QL AUTO: ABNORMAL /HPF
BARBITURATES UR QL: NEGATIVE
BASE EX.OXY STD BLDV CALC-SCNC: 71.8 % (ref 60–80)
BASE EXCESS BLDV CALC-SCNC: 1.6 MMOL/L
BENZODIAZ UR QL: NEGATIVE
BILIRUB DIRECT SERPL-MCNC: 0.43 MG/DL (ref 0–0.2)
BILIRUB SERPL-MCNC: 1.68 MG/DL (ref 0.2–1)
BILIRUB UR QL STRIP: NEGATIVE
BUN SERPL-MCNC: 6 MG/DL (ref 5–25)
CALCIUM SERPL-MCNC: 9.5 MG/DL (ref 8.3–10.1)
CHLORIDE SERPL-SCNC: 105 MMOL/L (ref 100–108)
CLARITY UR: CLEAR
CO2 SERPL-SCNC: 19 MMOL/L (ref 21–32)
COCAINE UR QL: NEGATIVE
COLOR UR: YELLOW
CREAT SERPL-MCNC: 0.98 MG/DL (ref 0.6–1.3)
ERYTHROCYTE [DISTWIDTH] IN BLOOD BY AUTOMATED COUNT: 13.2 % (ref 11.6–15.1)
ETHANOL SERPL-MCNC: <3 MG/DL (ref 0–3)
GFR SERPL CREATININE-BSD FRML MDRD: 74 ML/MIN/1.73SQ M
GLUCOSE SERPL-MCNC: 129 MG/DL (ref 65–140)
GLUCOSE UR STRIP-MCNC: NEGATIVE MG/DL
HCO3 BLDV-SCNC: 20.1 MMOL/L (ref 24–30)
HCT VFR BLD AUTO: 48.4 % (ref 34.8–46.1)
HGB BLD-MCNC: 16.7 G/DL (ref 11.5–15.4)
HGB UR QL STRIP.AUTO: ABNORMAL
HYALINE CASTS #/AREA URNS LPF: ABNORMAL /LPF
INR PPP: 0.94 (ref 0.84–1.19)
KETONES UR STRIP-MCNC: NEGATIVE MG/DL
LEUKOCYTE ESTERASE UR QL STRIP: ABNORMAL
MCH RBC QN AUTO: 29.7 PG (ref 26.8–34.3)
MCHC RBC AUTO-ENTMCNC: 34.5 G/DL (ref 31.4–37.4)
MCV RBC AUTO: 86 FL (ref 82–98)
METHADONE UR QL: NEGATIVE
NITRITE UR QL STRIP: NEGATIVE
NON-SQ EPI CELLS URNS QL MICRO: ABNORMAL /HPF
O2 CT BLDV-SCNC: 16.8 ML/DL
OPIATES UR QL SCN: POSITIVE
OXYCODONE+OXYMORPHONE UR QL SCN: NEGATIVE
P AXIS: 63 DEGREES
P AXIS: 70 DEGREES
PCO2 BLDV: 20.2 MM HG (ref 42–50)
PCP UR QL: NEGATIVE
PH BLDV: 7.62 [PH] (ref 7.3–7.4)
PH UR STRIP.AUTO: 8.5 [PH] (ref 4.5–8)
PLATELET # BLD AUTO: 298 THOUSANDS/UL (ref 149–390)
PMV BLD AUTO: 9.6 FL (ref 8.9–12.7)
PO2 BLDV: 29.5 MM HG (ref 35–45)
POTASSIUM SERPL-SCNC: 4.1 MMOL/L (ref 3.5–5.3)
PR INTERVAL: 134 MS
PR INTERVAL: 140 MS
PROT SERPL-MCNC: 8.1 G/DL (ref 6.4–8.2)
PROT UR STRIP-MCNC: NEGATIVE MG/DL
PROTHROMBIN TIME: 12.6 SECONDS (ref 11.6–14.5)
QRS AXIS: 111 DEGREES
QRS AXIS: 70 DEGREES
QRSD INTERVAL: 84 MS
QRSD INTERVAL: 92 MS
QT INTERVAL: 296 MS
QT INTERVAL: 408 MS
QTC INTERVAL: 420 MS
QTC INTERVAL: 444 MS
RBC # BLD AUTO: 5.63 MILLION/UL (ref 3.81–5.12)
RBC #/AREA URNS AUTO: ABNORMAL /HPF
SALICYLATES SERPL-MCNC: <3 MG/DL (ref 3–20)
SARS-COV-2 RNA RESP QL NAA+PROBE: NEGATIVE
SODIUM SERPL-SCNC: 134 MMOL/L (ref 136–145)
SP GR UR STRIP.AUTO: 1.01 (ref 1–1.03)
T WAVE AXIS: -28 DEGREES
T WAVE AXIS: 64 DEGREES
THC UR QL: NEGATIVE
TROPONIN I SERPL-MCNC: <0.02 NG/ML
UROBILINOGEN UR QL STRIP.AUTO: 0.2 E.U./DL
VENTRICULAR RATE: 135 BPM
VENTRICULAR RATE: 64 BPM
WBC # BLD AUTO: 6.36 THOUSAND/UL (ref 4.31–10.16)
WBC #/AREA URNS AUTO: ABNORMAL /HPF

## 2021-06-27 PROCEDURE — 99285 EMERGENCY DEPT VISIT HI MDM: CPT

## 2021-06-27 PROCEDURE — 99291 CRITICAL CARE FIRST HOUR: CPT | Performed by: PSYCHIATRY & NEUROLOGY

## 2021-06-27 PROCEDURE — 80179 DRUG ASSAY SALICYLATE: CPT | Performed by: EMERGENCY MEDICINE

## 2021-06-27 PROCEDURE — 36415 COLL VENOUS BLD VENIPUNCTURE: CPT

## 2021-06-27 PROCEDURE — 96376 TX/PRO/DX INJ SAME DRUG ADON: CPT

## 2021-06-27 PROCEDURE — 70498 CT ANGIOGRAPHY NECK: CPT

## 2021-06-27 PROCEDURE — 81003 URINALYSIS AUTO W/O SCOPE: CPT

## 2021-06-27 PROCEDURE — 82077 ASSAY SPEC XCP UR&BREATH IA: CPT | Performed by: EMERGENCY MEDICINE

## 2021-06-27 PROCEDURE — 85730 THROMBOPLASTIN TIME PARTIAL: CPT | Performed by: EMERGENCY MEDICINE

## 2021-06-27 PROCEDURE — 93010 ELECTROCARDIOGRAM REPORT: CPT | Performed by: INTERNAL MEDICINE

## 2021-06-27 PROCEDURE — 99291 CRITICAL CARE FIRST HOUR: CPT | Performed by: EMERGENCY MEDICINE

## 2021-06-27 PROCEDURE — 82805 BLOOD GASES W/O2 SATURATION: CPT | Performed by: EMERGENCY MEDICINE

## 2021-06-27 PROCEDURE — 80076 HEPATIC FUNCTION PANEL: CPT | Performed by: EMERGENCY MEDICINE

## 2021-06-27 PROCEDURE — 82140 ASSAY OF AMMONIA: CPT | Performed by: INTERNAL MEDICINE

## 2021-06-27 PROCEDURE — 70496 CT ANGIOGRAPHY HEAD: CPT

## 2021-06-27 PROCEDURE — 81001 URINALYSIS AUTO W/SCOPE: CPT

## 2021-06-27 PROCEDURE — 96374 THER/PROPH/DIAG INJ IV PUSH: CPT

## 2021-06-27 PROCEDURE — 80143 DRUG ASSAY ACETAMINOPHEN: CPT | Performed by: EMERGENCY MEDICINE

## 2021-06-27 PROCEDURE — NC001 PR NO CHARGE: Performed by: INTERNAL MEDICINE

## 2021-06-27 PROCEDURE — 80307 DRUG TEST PRSMV CHEM ANLYZR: CPT | Performed by: EMERGENCY MEDICINE

## 2021-06-27 PROCEDURE — U0003 INFECTIOUS AGENT DETECTION BY NUCLEIC ACID (DNA OR RNA); SEVERE ACUTE RESPIRATORY SYNDROME CORONAVIRUS 2 (SARS-COV-2) (CORONAVIRUS DISEASE [COVID-19]), AMPLIFIED PROBE TECHNIQUE, MAKING USE OF HIGH THROUGHPUT TECHNOLOGIES AS DESCRIBED BY CMS-2020-01-R: HCPCS | Performed by: EMERGENCY MEDICINE

## 2021-06-27 PROCEDURE — U0005 INFEC AGEN DETEC AMPLI PROBE: HCPCS | Performed by: EMERGENCY MEDICINE

## 2021-06-27 PROCEDURE — 96375 TX/PRO/DX INJ NEW DRUG ADDON: CPT

## 2021-06-27 PROCEDURE — 85610 PROTHROMBIN TIME: CPT | Performed by: EMERGENCY MEDICINE

## 2021-06-27 PROCEDURE — 85027 COMPLETE CBC AUTOMATED: CPT | Performed by: EMERGENCY MEDICINE

## 2021-06-27 PROCEDURE — 93005 ELECTROCARDIOGRAM TRACING: CPT

## 2021-06-27 PROCEDURE — 80048 BASIC METABOLIC PNL TOTAL CA: CPT | Performed by: EMERGENCY MEDICINE

## 2021-06-27 PROCEDURE — 84484 ASSAY OF TROPONIN QUANT: CPT | Performed by: EMERGENCY MEDICINE

## 2021-06-27 PROCEDURE — 96361 HYDRATE IV INFUSION ADD-ON: CPT

## 2021-06-27 RX ORDER — LORAZEPAM 2 MG/ML
1 INJECTION INTRAMUSCULAR
Status: DISCONTINUED | OUTPATIENT
Start: 2021-06-27 | End: 2021-06-29

## 2021-06-27 RX ORDER — LORAZEPAM 2 MG/ML
1 INJECTION INTRAMUSCULAR ONCE
Status: COMPLETED | OUTPATIENT
Start: 2021-06-27 | End: 2021-06-27

## 2021-06-27 RX ORDER — LORAZEPAM 2 MG/ML
1 INJECTION INTRAMUSCULAR ONCE AS NEEDED
Status: DISCONTINUED | OUTPATIENT
Start: 2021-06-27 | End: 2021-06-27

## 2021-06-27 RX ORDER — SODIUM CHLORIDE 9 MG/ML
125 INJECTION, SOLUTION INTRAVENOUS CONTINUOUS
Status: DISCONTINUED | OUTPATIENT
Start: 2021-06-27 | End: 2021-06-29

## 2021-06-27 RX ORDER — ONDANSETRON 2 MG/ML
INJECTION INTRAMUSCULAR; INTRAVENOUS
Status: COMPLETED
Start: 2021-06-27 | End: 2021-06-27

## 2021-06-27 RX ORDER — MELATONIN
1000 DAILY
Status: DISCONTINUED | OUTPATIENT
Start: 2021-06-28 | End: 2021-07-07 | Stop reason: HOSPADM

## 2021-06-27 RX ORDER — ACETAMINOPHEN 325 MG/1
650 TABLET ORAL EVERY 6 HOURS PRN
Status: DISCONTINUED | OUTPATIENT
Start: 2021-06-27 | End: 2021-06-30

## 2021-06-27 RX ORDER — LORAZEPAM 2 MG/ML
1 INJECTION INTRAMUSCULAR ONCE AS NEEDED
Status: COMPLETED | OUTPATIENT
Start: 2021-06-27 | End: 2021-06-27

## 2021-06-27 RX ORDER — FOLIC ACID 1 MG/1
1000 TABLET ORAL DAILY
Status: DISCONTINUED | OUTPATIENT
Start: 2021-06-28 | End: 2021-07-07 | Stop reason: HOSPADM

## 2021-06-27 RX ORDER — PRAZOSIN HYDROCHLORIDE 2 MG/1
2 CAPSULE ORAL DAILY
Status: DISCONTINUED | OUTPATIENT
Start: 2021-06-27 | End: 2021-07-07 | Stop reason: HOSPADM

## 2021-06-27 RX ORDER — AMLODIPINE BESYLATE 5 MG/1
5 TABLET ORAL DAILY
Status: DISCONTINUED | OUTPATIENT
Start: 2021-06-27 | End: 2021-07-02

## 2021-06-27 RX ORDER — ASCORBIC ACID 500 MG
500 TABLET ORAL 2 TIMES DAILY
Status: DISCONTINUED | OUTPATIENT
Start: 2021-06-27 | End: 2021-07-07 | Stop reason: HOSPADM

## 2021-06-27 RX ORDER — PANTOPRAZOLE SODIUM 40 MG/1
40 TABLET, DELAYED RELEASE ORAL
Status: DISCONTINUED | OUTPATIENT
Start: 2021-06-28 | End: 2021-06-30

## 2021-06-27 RX ORDER — PHYSOSTIGMINE SALICYLATE 1 MG/ML
1 INJECTION INTRAVENOUS ONCE
Status: COMPLETED | OUTPATIENT
Start: 2021-06-27 | End: 2021-06-27

## 2021-06-27 RX ORDER — ONDANSETRON 2 MG/ML
4 INJECTION INTRAMUSCULAR; INTRAVENOUS ONCE
Status: COMPLETED | OUTPATIENT
Start: 2021-06-27 | End: 2021-06-27

## 2021-06-27 RX ADMIN — SODIUM CHLORIDE 125 ML/HR: 0.9 INJECTION, SOLUTION INTRAVENOUS at 19:20

## 2021-06-27 RX ADMIN — ENOXAPARIN SODIUM 40 MG: 40 INJECTION SUBCUTANEOUS at 19:19

## 2021-06-27 RX ADMIN — ONDANSETRON 4 MG: 2 INJECTION INTRAMUSCULAR; INTRAVENOUS at 13:20

## 2021-06-27 RX ADMIN — SODIUM CHLORIDE 125 ML/HR: 0.9 INJECTION, SOLUTION INTRAVENOUS at 20:37

## 2021-06-27 RX ADMIN — IOHEXOL 85 ML: 350 INJECTION, SOLUTION INTRAVENOUS at 11:15

## 2021-06-27 RX ADMIN — LORAZEPAM 1 MG: 2 INJECTION INTRAMUSCULAR; INTRAVENOUS at 14:00

## 2021-06-27 RX ADMIN — LORAZEPAM 1 MG: 2 INJECTION INTRAMUSCULAR; INTRAVENOUS at 20:14

## 2021-06-27 RX ADMIN — SODIUM CHLORIDE 1000 ML: 0.9 INJECTION, SOLUTION INTRAVENOUS at 12:15

## 2021-06-27 RX ADMIN — LORAZEPAM 1 MG: 2 INJECTION INTRAMUSCULAR; INTRAVENOUS at 19:19

## 2021-06-27 RX ADMIN — PHYSOSTIGMINE SALICYLATE 1 MG: 1 INJECTION INTRAVENOUS at 13:10

## 2021-06-27 RX ADMIN — LORAZEPAM 1 MG: 2 INJECTION INTRAMUSCULAR; INTRAVENOUS at 13:20

## 2021-06-27 NOTE — H&P
INTERNAL MEDICINE RESIDENCY ADMISSION H&P     Name: Dipti Whiting   Age & Sex: 60 y o  female   MRN: [id number]  Unit/Bed#: ED 01   Encounter: [id number]  Primary Care Provider: Idalia Wu MD    Code Status: Level 1 - Full Code  Admission Status: INPATIENT   Disposition: Patient requires Med/Surg    Admit to team: SOD Team A    ASSESSMENT/PLAN     Principal Problem:    Encephalopathy  Active Problems:    Chronic pain syndrome    Bipolar II disorder ([facility] )    Tardive dyskinesia    Anxiety    Esophageal reflux    Hypertension    Opioid dependence ([facility] )      * Encephalopathy  Assessment & Plan  Per her son, the patient woke up on the morning of 6/27 with altered mental status  She was incoherent and could not be redirected  Neurology consult in the emergency department for stroke rule out  CT of the head and CT angiogram of the head and neck negative for any intracranial abnormality  Doubt CVA at this time  Possible anticholinergic toxicity  Patient did not respond to physostigmine as anticipated  Doubt infectious causes with no leukocytosis, negative UA, and no fever  Doubt hepatic encephalopathy with no history of hepatitis or alcoholism  Concern for polypharmacy      Plan:  · Medication reconciliation done with the patient's son with medications in hand  · Will discontinue a majority of medications including: hydroxyzine 50 mg QHS, nortriptyline 10 mg QHS, trazodone 200 mg QHS, oxybutynin 5 mg BID, pregabalin 225 mg QD, duloxetine 60 mg QD, morphine 15 mg BID, oxycodone 5 QD, Ingrezza 80 mg daily  · According to son, the patient is no longer taking:  Cetirizine 10 mg QD, buspirone 15 mg TID, quetiapine 300 mg QHS (has replaced this with lorazepam 0 5 mg Q6H)  · MRI of the brain ordered and pending compatibility with pain pump  · Toxicology and Neurology following  · Lorazepam IV 1 mg Q1H for agitation  · Neurochecks every 4 hours  · Regulate sleep-wake cycle  · Monitor for improvement off medications    Chronic pain syndrome  Assessment & Plan  Patient follows with Neurosurgery for chronic lower back pain  She had a thoracolumbar fusion years ago in [place]  She just recently had a nerve stimulator placed in February 2020  Home pain regimen includes oxycodone 5 mg daily, morphine 15 mg twice daily, pregabalin 225 mg daily, duloxetine 60 mg daily  Plan:  · In the setting of encephalopathy possibly secondary to polypharmacy, will hold all these medications at this time  · If the patient's pain persists, may consider introducing other forms of analgesia or slowly introducing some of her home medications  · Tylenol 650 mg every 6 hours as needed for pain    Bipolar II disorder [facility])  Assessment & Plan  Patient previously followed with Psychiatry, last visit in 2018  The patient recently had a stay in a behavior health unit in May 2021 after she signed 201 after coming to the emergency department with panic attacks  According to her son, after that encounter her quetiapine was discontinued and she was given lorazepam 0 5 mg every 6 hours  Her buspirone was also discontinued  Plan:  · Continue to monitor off these medications in the setting of her acute encephalopathy  · Consider Psychiatry consult in the morning      Tardive dyskinesia  Assessment & Plan  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  Patient taking Ingrezza 80 mg QD  Plan:  · Hold medication in the setting of acute encephalopathy    Opioid dependence ([facility] )  Assessment & Plan  Patient with a long history of chronic pain and opioid abuse  Current regimen includes oxycodone 5 mg daily and morphine 15 mg twice daily  UDS positive for opioids  Plan:  · Hold these medications in the setting of acute encephalopathy    Hypertension  Assessment & Plan  Patient presented hypertensive, resolved without intervention  Blood pressure is currently stable      Plan:  · Continue amlodipine 5 mg daily and prazosin 2 mg daily  · Monitor blood pressure daily    Esophageal reflux  Assessment & Plan  Currently stable  Home medication includes omeprazole 20 mg daily  Plan:  · Substitute pantoprazole 40 mg daily, omeprazole not on formulary    [address]  Patient with an extensive history of anxiety  Recently signed 201 in May 2021 for anxiety attacks  Patient's current home regimen includes nortriptyline 10 mg daily, hydroxyzine 50 mg daily at bedtime, duloxetine 60 mg daily, and lorazepam 0 5 mg every 6 hours  Patient was previously taking buspirone and quetiapine, but since her admission for 201 she has not been taking them according to worse on  Plan:  · Continue to hold all these medications in the setting of acute encephalopathy possibly due to polypharmacy      VTE Pharmacologic Prophylaxis: Enoxaparin (Lovenox)  VTE Mechanical Prophylaxis: sequential compression device    CHIEF COMPLAINT     Chief Complaint   Patient presents with    Dystonic Reaction     [facility] with c/o tongue twitching and complaint that she cannot throw up  aphasic, confused, difficulty following commands on arrival  L side weakness       HISTORY OF PRESENT ILLNESS   Patient is a 17-year-old female with past medical history of bipolar disorder, PTSD, panic attacks, fibromyalgia, chronic back pain, hypertension, urinary incontinence/overactive bladder, previous 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, polypharmacy, and migraines who presents to [facility] emergency department with altered mental status  Majority of the HPI was obtained through chart review and the patient's son, Catarina Camacho   The patient was extremely disoriented, incoherent, and uncooperative at the time of interview  Per her son, the patient was in her normal state health last night  At baseline, the patient requires some minor help with activities of daily living    She uses a walker to get around the house and at times requires assistance  But, overall the patient is able to take care self, including dispense and take all of her medications  This morning, her son stated that she was in her normal state of drowsiness when she wakes up  He states that normally after she takes her nortriptyline at night the following morning she is drowsy  However, as the morning continued, the patient became incoherent and repetitive  The son attempted to reorient the patient, but was unsuccessful  The son was concerned with how far off the patient was from her baseline, so she was brought in for evaluation of possible CVA  Upon arrival to the ED, patient was hypertensive, tachycardic, and tachypneic  Lab significant for sodium 134, alkaline phosphatase 120, total bilirubin 1 68, hemoglobin 16 7, negative COVID test, and negative UA  Stroke alert was called, but CT and CT angiogram unrevealing  Patient was seen by Neurology in the emergency department, but after initial imaging, there is low concern for stroke  An MRI was ordered  Toxicology saw the patient for concerns of anti cholinergic toxicity with her intermittent tachycardia, dry mucous membranes, encephalopathy, and urinary retention  2 mg of physostigmine was administered without much improvement in mentation  She had a paradoxical reaction with tachycardia instead of bradycardia; however, she did have increased secretions and diaphoresis  Based on her response, acute anticholinergic delirium as an etiology for agitation mental status was less likely  She was then given 2 separate doses of 1 mg lorazepam that calmed her agitation  The patient was admitted for acute encephalopathy possibly secondary to polypharmacy  And in depth medication reconciliation was done with her son over the phone with the patient's medications in hand  Will plan to hold all psychoactive and pain medications at this time  Initiate as needed hourly 1 mg injections of lorazepam per toxicology  Patient is a confirmed level 1 full code with her son  REVIEW OF SYSTEMS     Review of Systems   Unable to perform ROS: Mental status change     OBJECTIVE     Vitals:    21 1200 21 1230 21 1300 21 1400   BP: (!) 119/102 (!) 119/102 117/99 164/91   Pulse: (!) 106 (!) 106 (!) 124 100   Resp: (!) 34 (!) 36 (!) 30 (!) 30   Temp:       TempSrc:       SpO2: 100% 100% 99% 99%      Temperature:   Temp (24hrs), Av °F (36 7 °C), Min:98 °F (36 7 °C), Max:98 °F (36 7 °C)    Temperature: 98 °F (36 7 °C)  Intake & Output:  I/O        07 -  0700  07 -  0700  07 -  0700    IV Piggyback   1000    Total Intake   1000    Urine   1700    Total Output   1700    Net   -700               Weights: There is no height or weight on file to calculate BMI  Weight (last 2 days)     None        Physical Exam  Vitals and nursing note reviewed  Constitutional:       General: She is in acute distress  HENT:      Head: Normocephalic and atraumatic  Right Ear: External ear normal       Left Ear: External ear normal       Nose: Nose normal       Mouth/Throat:      Mouth: Mucous membranes are dry  Eyes:      Extraocular Movements: Extraocular movements intact  Pupils: Pupils are equal, round, and reactive to light  Comments: No mydriasis    Cardiovascular:      Rate and Rhythm: Regular rhythm  Tachycardia present  Heart sounds: No murmur heard  Pulmonary:      Breath sounds: Normal breath sounds  Comments: Tachypnea  Abdominal:      General: Bowel sounds are normal  There is no distension  Palpations: Abdomen is soft  Tenderness: There is no abdominal tenderness  Musculoskeletal:      Cervical back: Normal range of motion  Right lower leg: No edema  Left lower leg: No edema  Skin:     General: Skin is warm and dry  Capillary Refill: Capillary refill takes less than 2 seconds     Neurological:      Mental Status: She is disoriented  Comments: Patient is incoherent and not cooperative with exam  Having trouble forming sentences  No overt focal deficit; however, the patient does not follow commands  Random lip smacking and tongue movements   Psychiatric:         Mood and Affect: Mood is anxious  Speech: Speech is delayed  PAST MEDICAL HISTORY     Past Medical History:   Diagnosis Date    Acid reflux     Anxiety     RESOLVED:     Arthritis     Bipolar 2 disorder (HCC)     FOLLOWS WITH PSYCHIATRIST  CONTINUE LAMOTRIGINE; RESOLVED: [id number]    Depression     Familial tremor     both hands    Fibromyalgia     LAST ASSESSED: [id number]    Hearing aid worn     left ear    [place]     left ear    Hypertension     Left-sided weakness     Lower back pain     Memory loss of unknown cause     long and short term    Migraine     Obesity     Obesity, Class II, BMI 35-39 9     Overactive bladder     Panic attack     Post traumatic stress disorder     Seasonal allergies     Stroke [facility])     questionable stroke 2009    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    CONT ASA 81 MG DAILY; RESOLVED: [id number]    Urinary incontinence     Wears dentures     partial lower / full upper    Wears glasses      PAST SURGICAL HISTORY     Past Surgical History:   Procedure Laterality Date    BACK SURGERY       SECTION      COLONOSCOPY      RESOLVED: [id number]    EAR SURGERY      EGD      HYSTERECTOMY  2004    MYRINGOTOMY W/ TUBES Left     NECK SURGERY  2019    NH CYSTOURETHROSCOPY N/A 2016    Procedure: CYSTOSCOPY, BOTOX INJECTION;  Surgeon: Brayan Hewitt MD;  Location: [facility];  Service: Gynecology    NH IMPLANT SPINAL NEUROSTIM/ Right 2/10/2021    Procedure: REPLACEMENT IMPLANTABLE PULSE GENERATOR DORSAL SPINAL COLUMN STIMULATOR, RIGHT;  Surgeon: Shannon Barber MD;  Location:  MAIN OR;  Service: Neurosurgery    NH PERCUT IMPLNT NEUROELECT,EPIDURAL Right 7/28/2020    Procedure: INSERTION THORACIC DORSAL COLUMN SPINAL CORD STIMULATOR PERCUTANEOUS W IMPLANTABLE PULSE GENERATOR, RIGHT;  Surgeon: Neli Sesay MD;  Location:  MAIN OR;  Service: Neurosurgery   [facility]    UPPER GASTROINTESTINAL ENDOSCOPY  09/2020     SOCIAL & FAMILY HISTORY     Social History     Substance and Sexual Activity   Alcohol Use Not Currently    Comment: 2 x year; being a social drinker as per all scripts      Substance and Sexual Activity   Alcohol Use Not Currently    Comment: 2 x year; being a social drinker as per all scripts         Substance and Sexual Activity   Drug Use No     Social History     Tobacco Use   Smoking Status Never Smoker   Smokeless Tobacco Never Used     Family History   Problem Relation Age of Onset    Colon cancer Mother     Alzheimer's disease Father     Stroke Father     Colon cancer Brother     Bipolar disorder Brother     Breast cancer Maternal Aunt     Colon cancer Maternal Aunt     Heart disease Other     Diabetes Other     Hypertension Other     Seizures Son     Depression Paternal Grandfather     No Known Problems Sister     No Known Problems Brother     Thyroid disease Neg Hx      LABORATORY DATA     Labs: I have personally reviewed pertinent reports      Results from last 7 days   Lab Units 06/27/21  1102   WBC Thousand/uL 6 36   HEMOGLOBIN g/dL 16 7*   HEMATOCRIT % 48 4*   PLATELETS Thousands/uL 298      Results from last 7 days   Lab Units 06/27/21  1217 06/27/21  1102   POTASSIUM mmol/L  --  4 1   CHLORIDE mmol/L  --  105   CO2 mmol/L  --  19*   BUN mg/dL  --  6   CREATININE mg/dL  --  0 98   CALCIUM mg/dL  --  9 5   ALK PHOS U/L 120*  --    ALT U/L 36  --    AST U/L 25  --               Results from last 7 days   Lab Units 06/27/21  1102   INR  0 94   PTT seconds 30         Results from last 7 days   Lab Units 06/27/21  1102   TROPONIN I ng/mL <0 02     Micro:  Lab Results Component Value Date    BLOODCX No Growth After 5 Days  03/09/2020    BLOODCX No Growth After 5 Days  03/09/2020     IMAGING & DIAGNOSTIC TESTS     Imaging: I have personally reviewed pertinent reports  CT stroke alert brain    Result Date: 6/27/2021  Impression: No acute intracranial abnormality  Findings were directly discussed with Dr Rhona Angeles on 6/27/2021 11:27 AM  Workstation performed: [id number]     CTA stroke alert (head/neck)    Result Date: 6/27/2021  Impression: No significant carotid or vertebral artery stenosis  No focal intracranial stenosis or aneurysm  Findings were directly discussed with Dr Rhona Angeles on 6/27/2021 11:27 AM  Workstation performed: [id number]     EKG, Pathology, and Other Studies: I have personally reviewed pertinent reports  ALLERGIES   No Known Allergies  MEDICATIONS PRIOR TO ARRIVAL     Prior to Admission medications    Medication Sig Start Date End Date Taking?  Authorizing Provider   acetaminophen (TYLENOL) 325 mg tablet Take 2 tablets (650 mg total) by mouth every 8 (eight) hours as needed for mild pain 3/9/21  Yes Alyssia Cox,    amLODIPine (NORVASC) 5 mg tablet Take 1 tablet (5 mg total) by mouth daily 6/5/21 9/3/21 Yes Kaitlynn Wood MD   [facility] Vitamin C 500 MG tablet TAKE 1 TABLET BY MOUTH TWICE A DAY 4/14/21  Yes Kaitlynn Wood MD   diclofenac sodium (VOLTAREN) 1 % APPLY 4 G TOPICALLY 4 (FOUR) TIMES A DAY 8/5/20  Yes Tia Barron,    DULoxetine (CYMBALTA) 60 mg delayed release capsule TAKE 1 CAPSULE BY MOUTH EVERY DAY 4/14/21  Yes Kaitlynn Wood MD   ferrous sulfate 324 (65 Fe) mg TAKE 1 TABLET (324 MG TOTAL) BY MOUTH 2 (TWO) TIMES A DAY BEFORE MEALS 4/13/21  Yes Kaitlynn Wood MD   folic acid (FOLVITE) 1 mg tablet TAKE 1 TABLET BY MOUTH EVERY DAY 4/14/21  Yes Kaitlynn Wood MD   hydrOXYzine HCL (ATARAX) 50 mg tablet TAKE 1 TABLET (50 MG TOTAL) BY MOUTH DAILY AT BEDTIME AS NEEDED FOR ANXIETY (INSOMNIA) 6/3/21  Yes Kaitlynn Wood MD   lidocaine (LMX) 4 % cream Apply topically as needed for mild pain 7/16/20  Yes Liza Rey,    LORazepam (ATIVAN) 0 5 mg tablet Take by mouth every 6 (six) hours 6/3/21  Yes Historical Provider, MD   morphine (MS CONTIN) 15 mg 12 hr tablet Take 1 tablet (15 mg total) by mouth 2 (two) times a day Hold if sedatedMax Daily Amount: 30 mg 6/25/21  Yes Grace Méndez MD   Mouthwashes (Biotene Dry Mouth) LIQD Apply 5 mL to the mouth or throat daily   Yes Historical Provider, MD   nortriptyline (PAMELOR) 10 mg capsule Take 1 capsule (10 mg total) by mouth daily at bedtime 4/16/21  Yes Cory Aldana,    orlistat (AYDEN) 60 MG capsule Take 60 mg by mouth 3 (three) times a day with meals   Yes Historical Provider, MD   oxyCODONE (ROXICODONE) 5 mg immediate release tablet Take 1 tablet (5 mg total) by mouth dailyMax Daily Amount: 5 mg 6/25/21  Yes Grace Méndez MD   Polyethylene Glycol 3350 (MIRALAX PO) Take by mouth   Yes Historical Provider, MD   prazosin (MINIPRESS) 2 mg capsule TAKE 1 CAPSULE BY MOUTH EVERY DAY 7/9/20  Yes Miguelina Pierce MD   pregabalin (LYRICA) 225 MG capsule TAKE 1 CAPSULE (225 MG TOTAL) BY MOUTH EVERY 12 (TWELVE) HOURS 4/29/21  Yes Nessa Velazquez,    traZODone (DESYREL) 100 mg tablet Take 100 mg by mouth daily at bedtime 2 tablets daily at bedtime   Yes Historical Provider, MD   Valbenazine Tosylate (Ingrezza) 80 MG CAPS Take 80 mg by mouth daily 3/17/21  Yes Marisol Stallings MD   bisacodyl (DULCOLAX) 5 mg EC tablet Take 5 mg by mouth daily as needed for constipation  Patient not taking: Reported on 6/27/2021    Historical Provider, MD   busPIRone (BUSPAR) 15 mg tablet Take 1 tablet (15 mg total) by mouth 3 (three) times a day  Patient not taking: Reported on 6/23/2021 3/17/21 6/23/21  Marisol Stallings MD   cetirizine (ZyrTEC) 10 mg tablet Take 10 mg by mouth as needed   Patient not taking: Reported on 6/27/2021 11/19/20   Historical Provider, MD   cholecalciferol (VITAMIN D3) 1,000 units tablet Take 1 tablet (1,000 Units total) by mouth daily 8/4/20 6/23/21  Evelyn Johnson MD   Diapers & Supplies (Huggies Pull-Ups) MISC Use 3 (three) times a day  Patient not taking: Reported on 6/27/2021 4/20/21   Christ Lou, DO   ipratropium-albuterol (DUO-NEB) 0 5-2 5 mg/3 mL nebulizer solution Take 1 vial (3 mL total) by nebulization every 6 (six) hours as needed for wheezing or shortness of breath  Patient not taking: Reported on 6/10/2021 3/29/21   Dwain Ho, DO   Multiple Vitamins-Minerals (multivitamin with minerals) tablet Take 1 tablet by mouth daily  Patient not taking: Reported on 6/27/2021 8/18/20   Ignacia Singleton MD   naloxone [facility]) 4 mg/0 1 mL nasal spray Administer 1 spray into a nostril  If no response after 2-3 minutes, give another dose in the other nostril using a new spray    Patient not taking: Reported on 6/10/2021 3/29/21   Cherie Cantor, DO   omeprazole (PriLOSEC) 20 mg delayed release capsule Take 1 capsule (20 mg total) by mouth daily 3/17/21 6/15/21  Evelyn Johnson MD   ondansetron (ZOFRAN-ODT) 4 mg disintegrating tablet Take 1 tablet (4 mg total) by mouth every 6 (six) hours as needed for nausea or vomiting  Patient not taking: Reported on 6/27/2021 9/17/20   Sweta Aldana, DO   oxybutynin (DITROPAN) 5 mg tablet Take 1 tablet (5 mg total) by mouth 2 (two) times a day 3/17/21 6/15/21  Evelyn Johnson MD   potassium chloride (MICRO-K) 10 MEQ CR capsule Take 10 mEq by mouth 2 (two) times a day Two tabs, two times daily  Patient not taking: Reported on 6/27/2021    Historical Provider, MD   QUEtiapine (SEROquel) 300 mg tablet Take 300 mg by mouth daily at bedtime  Patient not taking: Reported on 6/27/2021    Historical Provider, MD   Respiratory Therapy Supplies (Nebulizer) YUDY Use as needed (Shortness of breath)  Patient not taking: Reported on 4/20/2021 3/29/21   Dwain Ho DO   Sennosides (SENEXON PO) Take by mouth  Patient not taking: Reported on 6/27/2021    Historical Provider, MD   SIMETHICONE PO Take by mouth as needed   Patient not taking: Reported on 6/27/2021    Historical Provider, MD   sodium chloride (OCEAN) 0 65 % nasal spray 2 sprays into each nostril as needed  Patient not taking: Reported on 6/27/2021    Historical Provider, MD   triamcinolone (KENALOG) 0 025 % cream Apply topically 2 (two) times a day  Patient not taking: Reported on 6/27/2021 7/16/20   Derick Apley, DO     MEDICATIONS ADMINISTERED IN LAST 24 HOURS     Medication Administration - last 24 hours from 06/26/2021 1638 to 06/27/2021 1638       Date/Time Order Dose Route Action Action by     06/27/2021 1115 iohexol (OMNIPAQUE) 350 MG/ML injection (MULTI-DOSE) 85 mL 85 mL Intravenous Given Norman Garre     06/27/2021 1351 sodium chloride 0 9 % bolus 1,000 mL 0 mL Intravenous Stopped Ana Neville RN     06/27/2021 1215 sodium chloride 0 9 % bolus 1,000 mL 1,000 mL Intravenous New Bag Ana Neville RN     06/27/2021 1310 physostigmine salicylate (ANTILIRIUM) injection 1 mg 1 mg Intravenous Given Ana Neville RN     06/27/2021 1320 LORazepam (ATIVAN) injection 1 mg 1 mg Intravenous Given Ana Neville RN     06/27/2021 1320 ondansetron (ZOFRAN) injection 4 mg 4 mg Intravenous Given Ana Neville RN     06/27/2021 1400 LORazepam (ATIVAN) injection 1 mg 1 mg Intravenous Given Ana Neville RN        CURRENT MEDICATIONS     Current Facility-Administered Medications   Medication Dose Route Frequency Provider Last Rate    acetaminophen  650 mg Oral Q6H PRN Jeralene Ready, DO      amLODIPine  5 mg Oral Daily Jeralene Ready, DO      ascorbic acid  500 mg Oral BID Jeralene Ready, DO      cholecalciferol  1,000 Units Oral Daily Chris Smart, DO      enoxaparin  40 mg Subcutaneous Daily Jeralene Ready, DO      folic acid  3,958 mcg Oral Daily Jeralene Ready, DO      LORazepam  1 mg Intravenous Q1H PRN Arielle Mcdermott MD      [START ON 6/28/2021] pantoprazole  40 mg Oral Early Morning Jeralene Ready, DO      prazosin  2 mg Oral Daily Molly Lara,       sodium chloride  125 mL/hr Intravenous Continuous Chris Charles,        sodium chloride, 125 mL/hr      acetaminophen, 650 mg, Q6H PRN  LORazepam, 1 mg, Q1H PRN        Admission Time  I spent 1 hour admitting the patient  This involved direct patient contact where I performed a full history and physical, reviewing previous records, and reviewing laboratory and other diagnostic studies  Portions of the record may have been created with voice recognition software  Occasional wrong word or "sound a like" substitutions may have occurred due to the inherent limitations of voice recognition software    Read the chart carefully and recognize, using context, where substitutions have occurred     ==  Molly Lara, [address] [facility]  Internal Medicine Residency PGY-1 IV intact

## 2021-06-27 NOTE — ED ATTENDING ATTESTATION
Final Diagnosis:  1  Altered mental status    2  Encephalopathy      ED Course as of Jun 28 1609   Mimi Jun 27, 2021   1113 WBC: 6 36   1113 Hemoglobin(!): 16 7   1113 Platelet Count: 449   1229 pH, Tez(!): 7 615   1234 Discussed with toxicology who want to try Physostigmine  Reasonable  Will do so  Tahira Segura MD, saw and evaluated the patient  All available labs and X-rays were ordered by me or the resident and have been reviewed by myself  I discussed the patient with the resident / non-physician and agree with the resident's / non-physician practitioner's findings and plan as documented in the resident's / non-physician practicitioner's note, except where noted  At this point, I agree with the current assessment done in the ED  I was present during key portions of all procedures performed unless otherwise stated  Chief Complaint   Patient presents with    Dystonic Reaction     [facility] with c/o tongue twitching and complaint that she cannot throw up  aphasic, confused, difficulty following commands on arrival  L side weakness      This is a 60 y o  female presenting for evaluation of seizure? Stroke? The LKN was last night  This morning, the son found her confused, lip smacking, complaining of weakness with dysarthria/slurred speech so called EMS who brought her in  Questionable LEFT sided weakness  Stroke alert called on arrival    Details are very limited at this point  Unclear if she took any extra doses of her psychiatric/other medications  Unclear time of onset  No reported seizure hx    A:  - Seizure? Stroke? Metabolic process? P:  - Stroke alert but much more likely to be metabolic encephalopathy  - too much seroquel? Anticholinergic?  Encephalopathy of other etiology?  - discuss neurology  - admit to medicine    PMH:   has a past medical history of Acid reflux, Anxiety, Arthritis, Bipolar 2 disorder ([facility]), Depression, Familial tremor, Fibromyalgia, Hearing aid worn, [place] (hard of hearing), Hypertension, Left-sided weakness, Lower back pain, Memory loss of unknown cause, Migraine, Obesity, Obesity, Class II, BMI 35-39 9, Overactive bladder, Panic attack, Post traumatic stress disorder, Seasonal allergies, Stroke (HCC), Thrombosis of cerebral arteries, Urinary incontinence, Wears dentures, and Wears glasses  PSH:   has a past surgical history that includes Hysterectomy (); Tubal ligation (); Myringotomy w/ tubes (Left); Tonsillectomy; Colonoscopy; pr cystourethroscopy (N/A, 2016);  section (); EAR SURGERY; Back surgery; Neck surgery (2019); pr percut implnt neuroelect,epidural (Right, 2020); EGD; pr implant spinal neurostim/ (Right, 2/10/2021); and Upper gastrointestinal endoscopy (2020)  Social:  Social History     Substance and Sexual Activity   Alcohol Use Not Currently    Comment: 2 x year; being a social drinker as per all scripts      Social History     Tobacco Use   Smoking Status Never Smoker   Smokeless Tobacco Never Used     Social History     Substance and Sexual Activity   Drug Use No     PE:  Vitals:    21 0600 21 0602 21 0737 21 1406   BP:  160/82 153/83 146/83   BP Location:   Left arm Right arm   Pulse:  63 74 70   Resp:   18 18   Temp:   97 8 °F (36 6 °C) 98 °F (36 7 °C)   TempSrc:   Oral Oral   SpO2:  96% 100% 98%   Weight: 91 7 kg (202 lb 1 6 oz)      General: VSS, NAD, awake, alert  Well-nourished, well-developed  Appears stated age  Head: Normocephalic, atraumatic, nontender  Eyes: PERRL, EOM-I  No diplopia  No hyphema  No subconjunctival hemorrhages  Symmetrical lids  ENTAtraumatic external nose and ears  Dry MM  No stridor  Phonation as below  No drooling  Base of mouth is soft  No mastoid tenderness  Neck: Symmetric, trachea midline  No JVD  CV: Peripheral pulses +2 throughout  No chest wall tenderness     Lungs:   Unlabored No retractions  No crepitus  No tachypnea  No paradoxical motion  Abd: +BS, soft, NT/ND    MSK:   FROM, but poor effort   No lower extremity edema  Back:   No CVAT  Skin: Dry, intact  Neuro: awake  Slurred speech  Continuously lip smackings, sticking her tongue out  +dry mouth / xerostomia  CN II-XII grossly intact  Motor grossly intact  Left sided weakness for EMS  Sensory grossly intact  Psychiatric/Behavioral: can't evaluate   Exam: deferred    - 13 point ROS was performed and all are normal unless stated in the history above  - Nursing note reviewed  Vitals reviewed  - Orders placed by myself and/or advanced practitioner / resident     - Previous chart was reviewed  - No language barrier    - History obtained from patient, EMS  - There are limitations to the history obtained  Reasons ROS could not be obtained: AMS  - Critical care time: 38 minutes  - Critical care time was exclusive of seperately bilable procedures and treating other patients as well as teaching time     - Critical care was necessary to treat or prevent imminent or life-threatening deterioration of the following condition: neurologic evaluation  - Critical care time was spent personally by me on the following activities as well as the above as per the ED course and rest of chart: blood draw for specimens, obtaining history from patient / surrogate, developement of a treatment plan, discussions with consultants, evaluation of patient's response to the treatment, examination of the patient, ordering/performing treatements and interventions, re-evaluation of the patient's condition, review of old charts, ordering/reviewing laboratory studies, ordering/reviewing of radiographic studies  - Patient does not need initiation of IV thrombolytics: LKN unclear    Code Status: Level 1 - Full Code  Advance Directive and Living Will:      Power of :    POLST:      Medications   amLODIPine (NORVASC) tablet 5 mg (5 mg Oral Not Given 6/28/21 0804)   cholecalciferol (VITAMIN D3) tablet 1,000 Units (1,000 Units Oral Not Given 6/28/21 0804)   ascorbic acid (VITAMIN C) tablet 500 mg (500 mg Oral Not Given 0/87/64 8904)   folic acid (FOLVITE) tablet 1,000 mcg (1,000 mcg Oral Not Given 6/28/21 0804)   pantoprazole (PROTONIX) EC tablet 40 mg (0 mg Oral Hold 6/28/21 0600)   prazosin (MINIPRESS) capsule 2 mg (2 mg Oral Not Given 6/28/21 0804)   enoxaparin (LOVENOX) subcutaneous injection 40 mg (40 mg Subcutaneous Given 6/27/21 1919)   sodium chloride 0 9 % infusion (125 mL/hr Intravenous New Bag 6/28/21 0609)   LORazepam (ATIVAN) injection 1 mg (1 mg Intravenous Given 6/28/21 1052)   acetaminophen (TYLENOL) tablet 650 mg (has no administration in time range)   ondansetron (ZOFRAN) injection 4 mg (has no administration in time range)   potassium chloride 20 mEq IVPB (premix) (20 mEq Intravenous New Bag 6/28/21 1405)   iohexol (OMNIPAQUE) 350 MG/ML injection (MULTI-DOSE) 85 mL (85 mL Intravenous Given 6/27/21 1115)   sodium chloride 0 9 % bolus 1,000 mL (0 mL Intravenous Stopped 6/27/21 1351)   physostigmine salicylate (ANTILIRIUM) injection 1 mg (1 mg Intravenous Given 6/27/21 1310)   LORazepam (ATIVAN) injection 1 mg (1 mg Intravenous Given 6/27/21 1320)   ondansetron (ZOFRAN) injection 4 mg (4 mg Intravenous Given 6/27/21 1320)   LORazepam (ATIVAN) injection 1 mg (1 mg Intravenous Given 6/27/21 1400)   Labetalol HCl (NORMODYNE) injection 10 mg (10 mg Intravenous Given 6/28/21 0431)   potassium chloride 20 mEq IVPB (premix) (20 mEq Intravenous New Bag 6/28/21 0806)     CTA stroke alert (head/neck)   Final Result      No significant carotid or vertebral artery stenosis  No focal intracranial stenosis or aneurysm  Findings were directly discussed with Dr Martha Dougherty on 6/27/2021 11:27 AM                      Workstation performed: [id number]         CT stroke alert brain   Final Result      No acute intracranial abnormality           Findings were directly discussed with Dr Evonne Le on 6/27/2021 11:27 AM          Workstation performed: [id number]           Orders Placed This Encounter   Procedures    Novel Coronavirus (Covid-19),PCR [facility] - 2 hour stat    Ova and parasite examination    Stool Enteric Bacterial Panel by PCR    Fecal leukocytes    Clostridium difficile toxin by PCR with EIA    CT stroke alert brain    CTA stroke alert (head/neck)    Basic metabolic panel    CBC and Platelet    Protime-INR    APTT    Troponin I    Ethanol    Salicylate level    Acetaminophen level-If concentration is detectable, please discuss with medical  on call  Blood gas, venous    Urine Microscopic    Hepatic function panel    Rapid drug screen, urine    Comprehensive metabolic panel    CBC and differential    Platelet count    Ammonia    Bilirubin, direct    Diet NPO; Sips with meds    Continuous cardiac monitoring    Continuous pulse oximetry    Weigh patient and record    Insert peripheral IV    Nursing dysphagia assessment    Nasal cannula oxygen    Fingerstick Glucose (POCT)    Urine dip analyzer    Nursing Communication Continuous cardiac monitoring + slow administration of physo over 2 minutes    Nursing communication Continue IV as ordered      Notify admitting physician    Notify admitting physician on arrival    Vital Signs per unit routine    Ambulate patient    Daily weights    I/O    Insert peripheral IV    Maintain IV access    Apply SCD or Foot pumps    Neuro checks    Insert urinary catheter    Bladder scan    Urinary retention protocol    Level 1-Full Code: all life saving measures are indicated    Inpatient consult to Toxicology    Inpatient consult to Case Management    Inpatient consult to Psychiatry    Contact and airborne isolation status    OT eval and treat    PT eval and treat    SPEECH bedside swallowing evaluation and treatment    EKG RESULTS    ECG 12 lead    ECG 12 lead    ECG 12 lead    EEG awake or drowsy routine    Inpatient Admission    Restraints non-violent     Labs Reviewed   BASIC METABOLIC PANEL - Abnormal       Result Value Ref Range Status    Sodium 134 (*) 136 - 145 mmol/L Final    Potassium 4 1  3 5 - 5 3 mmol/L Final    Comment: Moderately Hemolyzed; Results May be Affected    Chloride 105  100 - 108 mmol/L Final    CO2 19 (*) 21 - 32 mmol/L Final    ANION GAP 10  4 - 13 mmol/L Final    BUN 6  5 - 25 mg/dL Final    Creatinine 0 98  0 60 - 1 30 mg/dL Final    Comment: Standardized to IDMS reference method    Glucose 129  65 - 140 mg/dL Final    Comment: If the patient is fasting, the ADA then defines impaired fasting glucose as > 100 mg/dL and diabetes as > or equal to 123 mg/dL  Specimen collection should occur prior to Sulfasalazine administration due to the potential for falsely depressed results  Specimen collection should occur prior to Sulfapyridine administration due to the potential for falsely elevated results      Calcium 9 5  8 3 - 10 1 mg/dL Final    eGFR 74  ml/min/1 73sq m Final    Narrative:     [place] guidelines for Chronic Kidney Disease (CKD):     Stage 1 with normal or high GFR (GFR > 90 mL/min/1 73 square meters)    Stage 2 Mild CKD (GFR = 60-89 mL/min/1 73 square meters)    Stage 3A Moderate CKD (GFR = 45-59 mL/min/1 73 square meters)    Stage 3B Moderate CKD (GFR = 30-44 mL/min/1 73 square meters)    Stage 4 Severe CKD (GFR = 15-29 mL/min/1 73 square meters)    Stage 5 End Stage CKD (GFR <15 mL/min/1 73 square meters)  Note: GFR calculation is accurate only with a steady state creatinine   CBC AND PLATELET - Abnormal    WBC 6 36  4 31 - 10 16 Thousand/uL Final    RBC 5 63 (*) 3 81 - 5 12 Million/uL Final    Hemoglobin 16 7 (*) 11 5 - 15 4 g/dL Final    Hematocrit 48 4 (*) 34 8 - 46 1 % Final    MCV 86  82 - 98 fL Final    MCH 29 7  26 8 - 34 3 pg Final    MCHC 34 5  31 4 - 37 4 g/dL Final    RDW 13 2  11 6 - 15 1 % Final    Platelets 541  595 - 390 Thousands/uL Final    MPV 9 6 8 9 - 91 1 fL Final   SALICYLATE LEVEL - Abnormal    Salicylate Lvl <3 (*) 3 - 20 mg/dL Final   ACETAMINOPHEN LEVEL - Abnormal    Acetaminophen Level <2 (*) 10 - 20 ug/mL Final   BLOOD GAS, VENOUS - Abnormal    pH, Etz 7 615 (*) 7 300 - 7 400 Final    pCO2, Tez 20 2 (*) 42 0 - 50 0 mm Hg Final    pO2, Tez 29 5 (*) 35 0 - 45 0 mm Hg Final    HCO3, Tez 20 1 (*) 24 - 30 mmol/L Final    Base Excess, Tez 1 6  mmol/L Final    O2 Content, Tez 16 8  ml/dL Final    O2 HGB, VENOUS 71 8  60 0 - 80 0 % Final   URINE MICROSCOPIC - Abnormal    RBC, UA None Seen  None Seen, 2-4 /hpf Final    WBC, UA 4-10 (*) None Seen, 2-4 /hpf Final    Epithelial Cells Occasional  None Seen, Occasional /hpf Final    Bacteria, UA None Seen  None Seen, Occasional /hpf Final    Hyaline Casts, UA 3-5 (*) None Seen /lpf Final   HEPATIC FUNCTION PANEL - Abnormal    Total Bilirubin 1 68 (*) 0 20 - 1 00 mg/dL Final    Comment: Use of this assay is not recommended for patients undergoing treatment with eltrombopag due to the potential for falsely elevated results  Bilirubin, Direct 0 43 (*) 0 00 - 0 20 mg/dL Final    Alkaline Phosphatase 120 (*) 46 - 116 U/L Final    AST 25  5 - 45 U/L Final    Comment: Specimen collection should occur prior to Sulfasalazine and/or Sulfapyridine administration due to the potential for falsely depressed results  ALT 36  12 - 78 U/L Final    Comment: Specimen collection should occur prior to Sulfasalazine and/or Sulfapyridine administration due to the potential for falsely depressed results       Total Protein 8 1  6 4 - 8 2 g/dL Final    Albumin 4 1  3 5 - 5 0 g/dL Final   RAPID DRUG SCREEN, URINE - Abnormal    Amph/Meth UR Negative  Negative Final    Barbiturate Ur Negative  Negative Final    Benzodiazepine Urine Negative  Negative Final    Cocaine Urine Negative  Negative Final    Methadone Urine Negative  Negative Final    Opiate Urine Positive (*) Negative Final    PCP Ur Negative  Negative Final    THC Urine Negative  Negative Final    Oxycodone Urine Negative  Negative Final    Narrative:     Presumptive report  If requested, specimen will be sent to reference lab for confirmation  FOR MEDICAL PURPOSES ONLY  IF CONFIRMATION NEEDED PLEASE CONTACT THE LAB WITHIN 5 DAYS  Drug Screen Cutoff Levels:  AMPHETAMINE/METHAMPHETAMINES  1000 ng/mL  BARBITURATES     200 ng/mL  BENZODIAZEPINES     200 ng/mL  COCAINE      300 ng/mL  METHADONE      300 ng/mL  OPIATES      300 ng/mL  PHENCYCLIDINE     25 ng/mL  THC       50 ng/mL  OXYCODONE      100 ng/mL   AMMONIA - Abnormal    Ammonia <10 (*) 11 - 35 umol/L Final    Comment: Specimen collection should occur prior to Sulfasalazine administration due to the potential for falsely elevated results  Specimen collection should occur prior to Sulfapyridine administration due to the potential for falsely depressed results     URINE MACROSCOPIC, POC - Abnormal    Color, UA Yellow   Final    Clarity, UA Clear   Final    pH, UA 8 5 (*) 4 5 - 8 0 Final    Leukocytes, UA Small (*) Negative Final    Nitrite, UA Negative  Negative Final    Protein, UA Negative  Negative mg/dl Final    Glucose, UA Negative  Negative mg/dl Final    Ketones, UA Negative  Negative mg/dl Final    Urobilinogen, UA 0 2  0 2, 1 0 E U /dl E U /dl Final    Bilirubin, UA Negative  Negative Final    Blood, UA Trace (*) Negative Final    Specific [place], UA 1 015  1 003 - 1 030 Final    Narrative:     CLINITEK RESULT   NOVEL CORONAVIRUS (COVID-19), PCR [facility] - Normal    SARS-CoV-2 Negative  Negative Final    Comment:      Narrative:         PROTIME-INR - Normal    Protime 12 6  11 6 - 14 5 seconds Final    INR 0 94  0 84 - 1 19 Final   APTT - Normal    PTT 30  23 - 37 seconds Final    Comment: Therapeutic Heparin Range =  60-90 seconds   TROPONIN I - Normal    Troponin I <0 02  <=0 04 ng/mL Final    Comment: Siemens Chemistry analyzer 99% cutoff is > 0 04 ng/mL in network labs     o cTnI 99% cutoff is useful only when applied to patients in the clinical setting of myocardial ischemia   o cTnI 99% cutoff should be interpreted in the context of clinical history, ECG findings and possibly cardiac imaging to establish correct diagnosis  o cTnI 99% cutoff may be suggestive but clearly not indicative of a coronary event without the clinical setting of myocardial ischemia  MEDICAL ALCOHOL - Normal    Ethanol Lvl <3  0 - 3 mg/dL Final   PLATELET COUNT   COMA PANEL    Narrative: The following orders were created for panel order Coma Panel  Procedure                               Abnormality         Status                     ---------                               -----------         ------                     Ethanol[[id number]]                      Normal              Final result               Salicylate SENFS[[id number]]             Abnormal            Final result               Acetaminophen level-If c  Zack Glass Zack Glass [[id number]]  Abnormal            Final result                 Please view results for these tests on the individual orders  Time reflects when diagnosis was documented in both MDM as applicable and the Disposition within this note       Time User Action Codes Description Comment    6/27/2021 12:03 PM Lonnie Marrero Add [[id number]] Altered mental status     6/28/2021 10:13 AM Saniya Conroy Add [[id number]] Encephalopathy           ED Disposition       ED Disposition Condition Date/Time Comment    Admit Stable Sun Jun 27, 2021  2:13 PM Case was discussed with SOD and the patient's admission status was agreed to be Admission Status: inpatient status to the service of Dr Evelio Chaudhry               Follow-up Information    None       Current Discharge Medication List        START taking these medications    Details   Diclofenac Sodium (VOLTAREN) 1 % Apply 2 g topically 4 (four) times a day  Qty: 150 g, Refills: 0    Associated Diagnoses: Bilateral chronic knee pain           CONTINUE these medications which have NOT CHANGED    Details acetaminophen (TYLENOL) 325 mg tablet Take 2 tablets (650 mg total) by mouth every 8 (eight) hours as needed for mild pain  Qty: 60 tablet, Refills: 2    Associated Diagnoses: Ambulatory dysfunction; Lumbar radiculopathy      amLODIPine (NORVASC) 5 mg tablet Take 1 tablet (5 mg total) by mouth daily  Qty: 90 tablet, Refills: 3    Associated Diagnoses: Hypertension, unspecified type      [employer] Vitamin C 500 MG tablet TAKE 1 TABLET BY MOUTH TWICE A DAY  Qty: 60 tablet, Refills: 0    Associated Diagnoses: Primary osteoarthritis of right knee; Preop testing      DULoxetine (CYMBALTA) 60 mg delayed release capsule TAKE 1 CAPSULE BY MOUTH EVERY DAY  Qty: 30 capsule, Refills: 0    Associated Diagnoses: Generalized anxiety disorder      ferrous sulfate 324 (65 Fe) mg TAKE 1 TABLET (324 MG TOTAL) BY MOUTH 2 (TWO) TIMES A DAY BEFORE MEALS  Qty: 60 tablet, Refills: 1    Associated Diagnoses: Primary osteoarthritis of right knee; Preop testing      folic acid (FOLVITE) 1 mg tablet TAKE 1 TABLET BY MOUTH EVERY DAY  Qty: 30 tablet, Refills: 1    Associated Diagnoses: Primary osteoarthritis of right knee; Preop testing      hydrOXYzine HCL (ATARAX) 50 mg tablet TAKE 1 TABLET (50 MG TOTAL) BY MOUTH DAILY AT BEDTIME AS NEEDED FOR ANXIETY (INSOMNIA)  Qty: 90 tablet, Refills: 0    Associated Diagnoses: Anxiety      lidocaine (LMX) 4 % cream Apply topically as needed for mild pain  Qty: 30 g, Refills: 0    Associated Diagnoses: Lumbar radiculopathy      LORazepam (ATIVAN) 0 5 mg tablet Take by mouth every 6 (six) hours      morphine (MS CONTIN) 15 mg 12 hr tablet Take 1 tablet (15 mg total) by mouth 2 (two) times a day Hold if sedatedMax Daily Amount: 30 mg  Qty: 30 tablet, Refills: 0    Comments: PDMP checked and verified    Associated Diagnoses: Lumbar radiculopathy; Chronic pain disorder      Mouthwashes (Biotene Dry Mouth) LIQD Apply 5 mL to the mouth or throat daily      nortriptyline (PAMELOR) 10 mg capsule Take 1 capsule (10 mg total) by mouth daily at bedtime  Qty: 7 capsule, Refills: 0    Associated Diagnoses: Insomnia      orlistat (AYDEN) 60 MG capsule Take 60 mg by mouth 3 (three) times a day with meals      oxyCODONE (ROXICODONE) 5 mg immediate release tablet Take 1 tablet (5 mg total) by mouth dailyMax Daily Amount: 5 mg  Qty: 15 tablet, Refills: 0    Associated Diagnoses: Lumbar radiculopathy; Chronic pain disorder      Polyethylene Glycol 3350 (MIRALAX PO) Take by mouth      prazosin (MINIPRESS) 2 mg capsule TAKE 1 CAPSULE BY MOUTH EVERY DAY  Qty: 30 capsule, Refills: 1    Associated Diagnoses: Hypertension, unspecified type      pregabalin (LYRICA) 225 MG capsule TAKE 1 CAPSULE (225 MG TOTAL) BY MOUTH EVERY 12 (TWELVE) HOURS  Qty: 60 capsule, Refills: 0    Comments: Not to exceed 5 additional fills before 09/05/2021 DX Code Needed  PT REQUEST REFILL    Associated Diagnoses: Lumbar radiculopathy; Chronic pain disorder      traZODone (DESYREL) 100 mg tablet Take 100 mg by mouth daily at bedtime 2 tablets daily at bedtime      Valbenazine Tosylate (Ingrezza) 80 MG CAPS Take 80 mg by mouth daily  Qty: 30 capsule, Refills: 1    Associated Diagnoses: Tardive dyskinesia      bisacodyl (DULCOLAX) 5 mg EC tablet Take 5 mg by mouth daily as needed for constipation      busPIRone (BUSPAR) 15 mg tablet Take 1 tablet (15 mg total) by mouth 3 (three) times a day  Qty: 90 tablet, Refills: 0    Associated Diagnoses: Generalized anxiety disorder      cetirizine (ZyrTEC) 10 mg tablet Take 10 mg by mouth as needed       cholecalciferol (VITAMIN D3) 1,000 units tablet Take 1 tablet (1,000 Units total) by mouth daily  Qty: 90 tablet, Refills: 5    Associated Diagnoses: Vitamin D deficiency      Diapers & Supplies (Huggies Pull-Ups) MISC Use 3 (three) times a day  Qty: 100 each, Refills: 3    Associated Diagnoses: Urge incontinence of urine      ipratropium-albuterol (DUO-NEB) 0 5-2 5 mg/3 mL nebulizer solution Take 1 vial (3 mL total) by nebulization every 6 (six) hours as needed for wheezing or shortness of breath  Qty: 3 mL, Refills: 2    Associated Diagnoses: Dyspnea      Multiple Vitamins-Minerals (multivitamin with minerals) tablet Take 1 tablet by mouth daily  Qty: 30 tablet, Refills: 1    Associated Diagnoses: Primary osteoarthritis of right knee; Preop testing      naloxone (NARCAN) 4 mg/0 1 mL nasal spray Administer 1 spray into a nostril  If no response after 2-3 minutes, give another dose in the other nostril using a new spray  Qty: 1 each, Refills: 1    Associated Diagnoses: Lumbar radiculopathy; Chronic pain disorder      omeprazole (PriLOSEC) 20 mg delayed release capsule Take 1 capsule (20 mg total) by mouth daily  Qty: 90 capsule, Refills: 3    Associated Diagnoses: Gastroesophageal reflux disease without esophagitis      ondansetron (ZOFRAN-ODT) 4 mg disintegrating tablet Take 1 tablet (4 mg total) by mouth every 6 (six) hours as needed for nausea or vomiting  Qty: 20 tablet, Refills: 0    Associated Diagnoses: Vomiting      oxybutynin (DITROPAN) 5 mg tablet Take 1 tablet (5 mg total) by mouth 2 (two) times a day  Qty: 180 tablet, Refills: 3    Associated Diagnoses: Urge incontinence of urine      potassium chloride (MICRO-K) 10 MEQ CR capsule Take 10 mEq by mouth 2 (two) times a day Two tabs, two times daily      QUEtiapine (SEROquel) 300 mg tablet Take 300 mg by mouth daily at bedtime      Respiratory Therapy Supplies (Nebulizer) YUDY Use as needed (Shortness of breath)  Qty: 1 each, Refills: 0    Associated Diagnoses: Dyspnea      Sennosides (SENEXON PO) Take by mouth      SIMETHICONE PO Take by mouth as needed       sodium chloride (OCEAN) 0 65 % nasal spray 2 sprays into each nostril as needed      triamcinolone (KENALOG) 0 025 % cream Apply topically 2 (two) times a day  Qty: 30 g, Refills: 0    Associated Diagnoses: Rash           No discharge procedures on file    Prior to Admission Medications   Prescriptions Last Dose Informant Patient Reported? Taking?    [employer]S Vitamin C 500 MG tablet 6/26/2021 at Unknown time  No Yes   Sig: TAKE 1 TABLET BY MOUTH TWICE A DAY   DULoxetine (CYMBALTA) 60 mg delayed release capsule 6/26/2021 at Unknown time  No Yes   Sig: TAKE 1 CAPSULE BY MOUTH EVERY DAY   Diapers & Supplies (Huggies Pull-Ups) MISC Not Taking at Unknown time  No No   Sig: Use 3 (three) times a day   Patient not taking: Reported on 6/27/2021   Diclofenac Sodium (VOLTAREN) 1 %   No No   Sig: Apply 2 g topically 4 (four) times a day   LORazepam (ATIVAN) 0 5 mg tablet 6/26/2021 at Unknown time  Yes Yes   Sig: Take by mouth every 6 (six) hours   Mouthwashes (Biotene Dry Mouth) LIQD 6/26/2021 at Unknown time  Yes Yes   Sig: Apply 5 mL to the mouth or throat daily   Multiple Vitamins-Minerals (multivitamin with minerals) tablet Not Taking at Unknown time Outside Facility (Specify) No No   Sig: Take 1 tablet by mouth daily   Patient not taking: Reported on 6/27/2021   Polyethylene Glycol 3350 (MIRALAX PO) 6/26/2021 at Unknown time Outside Facility (Specify) Yes Yes   Sig: Take by mouth   QUEtiapine (SEROquel) 300 mg tablet Not Taking at Unknown time Self Yes No   Sig: Take 300 mg by mouth daily at bedtime   Patient not taking: Reported on 6/27/2021   Respiratory Therapy Supplies (Nebulizer) YUDY Not Taking at Unknown time  No No   Sig: Use as needed (Shortness of breath)   Patient not taking: Reported on 4/20/2021   SIMETHICONE PO Not Taking at Unknown time Outside Facility (Specify) Yes No   Sig: Take by mouth as needed    Patient not taking: Reported on 6/27/2021   Sennosides (SENEXON PO) Not Taking at Unknown time Outside Facility (Specify) Yes No   Sig: Take by mouth   Patient not taking: Reported on 6/27/2021   Valbenazine Tosylate (Ingrezza) 80 MG CAPS 6/26/2021 at Unknown time  No Yes   Sig: Take 80 mg by mouth daily   acetaminophen (TYLENOL) 325 mg tablet 6/26/2021 at Unknown time  No Yes   Sig: Take 2 tablets (650 mg total) by mouth every 8 (eight) hours as needed for mild pain   amLODIPine (NORVASC) 5 mg tablet 6/26/2021 at Unknown time  No Yes   Sig: Take 1 tablet (5 mg total) by mouth daily   bisacodyl (DULCOLAX) 5 mg EC tablet Not Taking at Unknown time Outside Facility (Specify) Yes No   Sig: Take 5 mg by mouth daily as needed for constipation   Patient not taking: Reported on 6/27/2021   busPIRone (BUSPAR) 15 mg tablet   No No   Sig: Take 1 tablet (15 mg total) by mouth 3 (three) times a day   Patient not taking: Reported on 6/23/2021   cetirizine (ZyrTEC) 10 mg tablet Not Taking at Unknown time Outside Facility ([address] [place] [address]) Yes No   Sig: Take 10 mg by mouth as needed    Patient not taking: Reported on 6/27/2021   cholecalciferol (VITAMIN D3) 1,000 units tablet  Outside Facility (Specify) No No   Sig: Take 1 tablet (1,000 Units total) by mouth daily   ferrous sulfate 324 (65 Fe) mg 6/26/2021 at Unknown time  No Yes   Sig: TAKE 1 TABLET (324 MG TOTAL) BY MOUTH 2 (TWO) TIMES A DAY BEFORE MEALS   folic acid (FOLVITE) 1 mg tablet 6/26/2021 at Unknown time  No Yes   Sig: TAKE 1 TABLET BY MOUTH EVERY DAY   hydrOXYzine HCL (ATARAX) 50 mg tablet 6/26/2021 at Unknown time  No Yes   Sig: TAKE 1 TABLET (50 MG TOTAL) BY MOUTH DAILY AT BEDTIME AS NEEDED FOR ANXIETY (INSOMNIA)   ipratropium-albuterol (DUO-NEB) 0 5-2 5 mg/3 mL nebulizer solution Not Taking at Unknown time  No No   Sig: Take 1 vial (3 mL total) by nebulization every 6 (six) hours as needed for wheezing or shortness of breath   Patient not taking: Reported on 6/10/2021   lidocaine (LMX) 4 % cream 6/26/2021 at Unknown time Outside Facility (Specify) No Yes   Sig: Apply topically as needed for mild pain   morphine (MS CONTIN) 15 mg 12 hr tablet 6/26/2021 at Unknown time  No Yes   Sig: Take 1 tablet (15 mg total) by mouth 2 (two) times a day Hold if sedatedMax Daily Amount: 30 mg   naloxone (NARCAN) 4 mg/0 1 mL nasal spray Unknown at Unknown time  No No   Sig: Administer 1 spray into a nostril   If no response after 2-3 minutes, give another dose in the other nostril using a new spray     Patient not taking: Reported on 6/10/2021   nortriptyline (PAMELOR) 10 mg capsule 2021 at Unknown time  No Yes   Sig: Take 1 capsule (10 mg total) by mouth daily at bedtime   omeprazole (PriLOSEC) 20 mg delayed release capsule   No No   Sig: Take 1 capsule (20 mg total) by mouth daily   ondansetron (ZOFRAN-ODT) 4 mg disintegrating tablet Not Taking at Unknown time Outside Facility (Specify) No No   Sig: Take 1 tablet (4 mg total) by mouth every 6 (six) hours as needed for nausea or vomiting   Patient not taking: Reported on 2021   orlistat (AYDEN) 60 MG capsule 2021 at Unknown time  Yes Yes   Sig: Take 60 mg by mouth 3 (three) times a day with meals   oxyCODONE (ROXICODONE) 5 mg immediate release tablet 2021 at Unknown time  No Yes   Sig: Take 1 tablet (5 mg total) by mouth dailyMax Daily Amount: 5 mg   oxybutynin (DITROPAN) 5 mg tablet   No No   Sig: Take 1 tablet (5 mg total) by mouth 2 (two) times a day   potassium chloride (MICRO-K) 10 MEQ CR capsule Not Taking at Unknown time Self Yes No   Sig: Take 10 mEq by mouth 2 (two) times a day Two tabs, two times daily   Patient not taking: Reported on 2021   prazosin (MINIPRESS) 2 mg capsule 2021 at Unknown time Outside Facility (Specify) No Yes   Sig: TAKE 1 CAPSULE BY MOUTH EVERY DAY   pregabalin (LYRICA) 225 MG capsule 2021 at Unknown time  No Yes   Sig: TAKE 1 CAPSULE (225 MG TOTAL) BY MOUTH EVERY 12 (TWELVE) HOURS   sodium chloride (OCEAN) 0 65 % nasal spray Not Taking at Unknown time  Yes No   Si sprays into each nostril as needed   Patient not taking: Reported on 2021   traZODone (DESYREL) 100 mg tablet 2021 at Unknown time Self Yes Yes   Sig: Take 100 mg by mouth daily at bedtime 2 tablets daily at bedtime   triamcinolone (KENALOG) 0 025 % cream Not Taking at Unknown time Outside Facility (Specify) No No   Sig: Apply topically 2 (two) times a day   Patient not taking: Reported on 6/27/2021      Facility-Administered Medications: None       Portions of the record may have been created with voice recognition software  Occasional wrong word or "sound a like" substitutions may have occurred due to the inherent limitations of voice recognition software  Read the chart carefully and recognize, using context, where substitutions have occurred      Electronically signed by:  Adan Polanco

## 2021-06-27 NOTE — CONSULTS
Consultation - Medical Toxicology  Samantha Ritter 60 y o  female MRN: [id number]  Unit/Bed#: ED 01 Encounter: [id number]       Reason for Consult / Principal Problem:  Encephalopathy  Inpatient consult to Toxicology  Consult performed by: Prasad Siddiqui DO  Consult ordered by: Lauren Byers MD        06/27/21      ASSESSMENT:  53-year-old female with acute encephalopathy concerning for anticholinergic delirium versus underlying neurological process  1  Encephalopathy  2  Polypharmacy  3  Respiratory alkalosis  4  Chronic tardive dyskinesia    RECOMMENDATIONS:  Please admit patient and continue supportive care and neurological evaluation  Although patient does have intermittent tachycardia, dry mucous membranes, encephalopathy, urinary retension, there is a component of chronicity to each of the symptoms and although she is on multiple medications with anticholinergic side effects and is at risk for overdose of unknown intention, CVA should still be excluded  One of her symptoms less concerning for anticholinergic toxicity and more concerning for primary neurological symptom seems to be echolalia  She does have a prior CVA and it is difficult for me to ascertain chronic symptoms from her presentation  Nonetheless, to eliminate the possibility of anticholinergic toxicity, we administered physostigmine 2 mg and her mentation did not improve  In fact, she had a paradoxical reaction tachycardia instead of bradycardia  She did have increased secretions and diaphoresis initially that resolved  Tachycardia also resolved  However, delirium had no effect  I would suggest that based on her response to physostigmine acute anticholinergic delirium as an etiology for her agitation and mental status is less likely  Her lip smacking is consistent with her known tardive dyskinesia  Regarding her agitation, please continue to administer lorazepam p r n  for agitation    Reasonable dosing would be 1-2 mg q 1 hour for agitation and tachycardia while evaluating for and excluded underlying medical condition  I would suspect that her respiratory alkalosis may also improve with control of her agitation and anxiety  Although anticholinergic toxicity she does not appear to be the primary etiology of her presentation, she does have many symptoms that appear anticholinergic in nature that are likely chronic and potentially related to polypharmacy  Patient should have a full medication reconciliation with elimination or modification of the multiple anticholinergic medications that she is administered, including quetiapine, cetirizine, hydroxyzine, nortriptyline, trazodone and oxybutynin  During her acute period of illness, please hold all the aforementioned medications as well as development is seen which is an MAO inhibitor  Re-evaluate as per medication reconciliation for re-initiation of any of her medications after she improves  For further questions, please call [facility]  Service or Patient Access Center to reach the medical  on call  Hx and PE limited by:  Encephalopathy    HPI: Samantha Escudero is a 60y o  year old female who presents with the acute encephalopathy and difficulty with speech  She is very repetitive and repeating phrases such as "I don't play" and "I can't throw " Son believes she is stating she cannot vomit but may need to  She underwent evaluation for CVA and had an unremarkable CTA of head and neck  She was seen by neurology who recommended only MRI  Patient had continuous agitation and anxiety as well, with intermittent carpopedal spasms  Review of Systems   Unable to perform ROS: Mental status change       Historical Information   Past Medical History:   Diagnosis Date    Acid reflux     Anxiety     RESOLVED: [id number]    Arthritis     Bipolar 2 disorder (HCC)     FOLLOWS WITH PSYCHIATRIST   CONTINUE LAMOTRIGINE; RESOLVED: [id number]    Depression  Familial tremor     both hands    Fibromyalgia     LAST ASSESSED: [id number]    Hearing aid worn     left ear    [place] (hard of hearing)     left ear    Hypertension     Left-sided weakness     Lower back pain     Memory loss of unknown cause     long and short term    Migraine     Obesity     Obesity, Class II, BMI 35-39 9     Overactive bladder     Panic attack     Post traumatic stress disorder     Seasonal allergies     Stroke [facility])     questionable stroke 2009    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    CONT ASA 81 MG DAILY; RESOLVED: [id number]    Urinary incontinence     Wears dentures     partial lower / full upper    Wears glasses      Past Surgical History:   Procedure Laterality Date    BACK SURGERY       SECTION      COLONOSCOPY      RESOLVED: [id number]    EAR SURGERY      EGD      HYSTERECTOMY      MYRINGOTOMY W/ TUBES Left     NECK SURGERY  2019    NM CYSTOURETHROSCOPY N/A 2016    Procedure: CYSTOSCOPY, BOTOX INJECTION;  Surgeon: Margot Fall MD;  Location: [facility];  Service: Gynecology    NM IMPLANT SPINAL NEUROSTIM/ Right 2/10/2021    Procedure: REPLACEMENT IMPLANTABLE PULSE GENERATOR DORSAL SPINAL COLUMN STIMULATOR, RIGHT;  Surgeon: Taina Dove MD;  Location:  MAIN OR;  Service: Neurosurgery    NM PERCUT IMPLNT Barrett Gimenez Right 2020    Procedure: INSERTION THORACIC DORSAL COLUMN SPINAL CORD STIMULATOR PERCUTANEOUS W IMPLANTABLE PULSE GENERATOR, RIGHT;  Surgeon: Taina Dove MD;  Location:  MAIN OR;  Service: Neurosurgery    [address] [place] [address]    UPPER GASTROINTESTINAL ENDOSCOPY  2020     Social History   Social History     Substance and Sexual Activity   Alcohol Use Not Currently    Comment: 2 x year; being a social drinker as per all scripts      Social History     Substance and Sexual Activity   Drug Use No     Social History     Tobacco Use   Smoking Status Never Smoker   Smokeless Tobacco Never Used     Family History   Problem Relation Age of Onset    Colon cancer Mother     Alzheimer's disease Father     Stroke Father     Colon cancer Brother     Bipolar disorder Brother     Breast cancer Maternal Aunt     Colon cancer Maternal Aunt     Heart disease Other     Diabetes Other     Hypertension Other     Seizures Son     Depression Paternal Grandfather     No Known Problems Sister     No Known Problems Brother     Thyroid disease Neg Hx         Prior to Admission medications    Medication Sig Start Date End Date Taking?  Authorizing Provider   acetaminophen (TYLENOL) 325 mg tablet Take 2 tablets (650 mg total) by mouth every 8 (eight) hours as needed for mild pain 3/9/21   Meena Hampton DO   amLODIPine (NORVASC) 5 mg tablet Take 1 tablet (5 mg total) by mouth daily 6/5/21 9/3/21  Ghada Shaw MD   bisacodyl (DULCOLAX) 5 mg EC tablet Take 5 mg by mouth daily as needed for constipation    Historical Provider, MD   busPIRone (BUSPAR) 15 mg tablet Take 1 tablet (15 mg total) by mouth 3 (three) times a day  Patient not taking: Reported on 6/23/2021 3/17/21 6/23/21  Ghada Shaw MD   cetirizine (ZyrTEC) 10 mg tablet Take 10 mg by mouth as needed  11/19/20   Historical Provider, MD   cholecalciferol (VITAMIN D3) 1,000 units tablet Take 1 tablet (1,000 Units total) by mouth daily 8/4/20 6/23/21  Ghada Shaw MD   [facility] Vitamin C 500 MG tablet TAKE 1 TABLET BY MOUTH TWICE A DAY 4/14/21   Ghada hSaw MD   Diapers & Supplies (Huggies Pull-Ups) MISC Use 3 (three) times a day 4/20/21   Sadie Neville DO   diclofenac sodium (VOLTAREN) 1 % APPLY 4 G TOPICALLY 4 (FOUR) TIMES A DAY  Patient taking differently: Apply 4 g topically 2 (two) times a day  8/5/20   Rosey Turner DO   DULoxetine (CYMBALTA) 60 mg delayed release capsule TAKE 1 CAPSULE BY MOUTH EVERY DAY 4/14/21   Ghada Shaw MD   ferrous sulfate 324 (65 Fe) mg TAKE 1 TABLET (324 MG TOTAL) BY MOUTH 2 (TWO) TIMES A DAY BEFORE MEALS  Patient not taking: Reported on 6/10/2021 4/13/21   Betina Hines MD   folic acid (FOLVITE) 1 mg tablet TAKE 1 TABLET BY MOUTH EVERY DAY 4/14/21   Betina Hines MD   hydrOXYzine HCL (ATARAX) 50 mg tablet TAKE 1 TABLET (50 MG TOTAL) BY MOUTH DAILY AT BEDTIME AS NEEDED FOR ANXIETY (INSOMNIA) 6/3/21   Betina Hines MD   ipratropium-albuterol (DUO-NEB) 0 5-2 5 mg/3 mL nebulizer solution Take 1 vial (3 mL total) by nebulization every 6 (six) hours as needed for wheezing or shortness of breath  Patient not taking: Reported on 6/10/2021 3/29/21   Jonathan Davis DO   lidocaine (LMX) 4 % cream Apply topically as needed for mild pain  Patient not taking: Reported on 5/21/2021 7/16/20   Maddy Fitzpatrick DO   LORazepam (ATIVAN) 0 5 mg tablet Take by mouth every 6 (six) hours 6/3/21   Historical Provider, MD   morphine (MS CONTIN) 15 mg 12 hr tablet Take 1 tablet (15 mg total) by mouth 2 (two) times a day Hold if sedatedMax Daily Amount: 30 mg 6/25/21   Alex Saba MD   Mouthwashes (Biotene Dry Mouth) LIQD Apply 5 mL to the mouth or throat daily    Historical Provider, MD   Multiple Vitamins-Minerals (multivitamin with minerals) tablet Take 1 tablet by mouth daily 8/18/20   Neli Ko MD   naloxone [facility]) 4 mg/0 1 mL nasal spray Administer 1 spray into a nostril  If no response after 2-3 minutes, give another dose in the other nostril using a new spray    Patient not taking: Reported on 6/10/2021 3/29/21   Shayy Vang DO   nortriptyline (PAMELOR) 10 mg capsule Take 1 capsule (10 mg total) by mouth daily at bedtime 4/16/21   Parish Aldana DO   omeprazole (PriLOSEC) 20 mg delayed release capsule Take 1 capsule (20 mg total) by mouth daily 3/17/21 6/15/21  Betina Hines MD   ondansetron (ZOFRAN-ODT) 4 mg disintegrating tablet Take 1 tablet (4 mg total) by mouth every 6 (six) hours as needed for nausea or vomiting 9/17/20   Deward Seen, DO   oxybutynin (DITROPAN) 5 mg tablet Take 1 tablet (5 mg total) by mouth 2 (two) times a day 3/17/21 6/15/21  Graciela Hartmann MD   oxyCODONE (ROXICODONE) 5 mg immediate release tablet Take 1 tablet (5 mg total) by mouth dailyMax Daily Amount: 5 mg 6/25/21   Sheyla Steele MD   Polyethylene Glycol 3350 (MIRALAX PO) Take by mouth    Historical Provider, MD   potassium chloride (MICRO-K) 10 MEQ CR capsule Take 10 mEq by mouth 2 (two) times a day Two tabs, two times daily    Historical Provider, MD   prazosin (MINIPRESS) 2 mg capsule TAKE 1 CAPSULE BY MOUTH EVERY DAY 7/9/20   Mary Hansen MD   pregabalin (LYRICA) 225 MG capsule TAKE 1 CAPSULE (225 MG TOTAL) BY MOUTH EVERY 12 (TWELVE) HOURS 4/29/21   [place], DO   QUEtiapine (SEROquel) 300 mg tablet Take 300 mg by mouth daily at bedtime    Historical Provider, MD   Respiratory Therapy Supplies (Nebulizer) YUDY Use as needed (Shortness of breath)  Patient not taking: Reported on 4/20/2021 3/29/21   Jaleesa Amin DO   Sennosides (SENEXON PO) Take by mouth    Historical Provider, MD   SIMETHICONE PO Take by mouth as needed     Historical Provider, MD   sodium chloride (OCEAN) 0 65 % nasal spray 2 sprays into each nostril as needed    Historical Provider, MD   traZODone (DESYREL) 100 mg tablet Take 100 mg by mouth daily at bedtime 2 tablets daily at bedtime    Historical Provider, MD   triamcinolone (KENALOG) 0 025 % cream Apply topically 2 (two) times a day 7/16/20   Mee Gunn DO   Valbenazine Tosylate (Ingrezza) 80 MG CAPS Take 80 mg by mouth daily 3/17/21   Graciela Hartmann MD       Current Facility-Administered Medications   Medication Dose Route Frequency    LORazepam (ATIVAN) injection 1 mg  1 mg Intravenous Once    sodium chloride 0 9 % bolus 1,000 mL  1,000 mL Intravenous Once       No Known Allergies    Objective       Intake/Output Summary (Last 24 hours) at 6/27/2021 1313  Last data filed at 6/27/2021 1200  Gross per 24 hour   Intake --   Output 800 ml   Net -800 ml Results from last 7 days   Lab Units 06/27/21  1102   SODIUM mmol/L 134*   POTASSIUM mmol/L 4 1   CHLORIDE mmol/L 105   CO2 mmol/L 19*   BUN mg/dL 6   CREATININE mg/dL 0 98   CALCIUM mg/dL 9 5      Results from last 7 days   Lab Units 06/27/21  1102   INR  0 94   PTT seconds 30         0   Lab Value Date/Time    TROPONINI <0 02 06/27/2021 1102    TROPONINI <0 02 04/13/2021 1323    TROPONINI <0 02 09/04/2020 1100     Results from last 7 days   Lab Units 06/27/21  1217   PH JAM  7 615*   PCO2 JAM mm Hg 20 2*   PO2 JAM mm Hg 29 5*   HCO3 JAM mmol/L 20 1*   O2 CONTENT JAM ml/dL 16 8   O2 HGB, VENOUS % 71 8     Results from last 7 days   Lab Units 06/27/21  1217   ACETAMINOPHEN LVL ug/mL <2*   ETHANOL LVL mg/dL <3   SALICYLATE LVL mg/dL <3*         Imaging Studies: I have personally reviewed pertinent reports  Minutes of critical care time 46  Critical care time was exclusive of seperately billable procedures and teaching time  Critical care was necessary to treat or prevent imminent or life-threatening deterioration of the following condition (s): CNS failure/comprimise, dehydration, toxidrome    Critical care time was spent personally by me on the following activities: obtaining history from patient/surrogate, development of a treatment plan, discussions with primary provider(s), evaluation of patient's response to the treatment, examination of the patient, recommending treatments and interventions, re-evaluation of the patient's condition, review of old charts, recommending/reviewing laboratory studies, recommending/reviewing of radiographic studies

## 2021-06-27 NOTE — ASSESSMENT & PLAN NOTE
Patient previously followed with Psychiatry, last visit in 2018  The patient recently had a stay in a behavior health unit in May 2021 after she signed 201 after coming to the emergency department with panic attacks  According to her son, after that encounter her quetiapine was discontinued and she was given lorazepam 0 5 mg every 6 hours  Her buspirone was also discontinued      Plan:  · Continue to monitor off these medications in the setting of her acute encephalopathy  · Quetiapine increased to 100 mg daily  · Patient has outpatient follow up with psychiatry on 7/19/2021

## 2021-06-27 NOTE — ASSESSMENT & PLAN NOTE
Patient is a 60year old female with history of polypharmacy who presents as a stroke alert given new onset aphasia, dysarthria and LLE weakness  NIHSS of 9 mostly due to aphasia and drift of the LLE  LKW night prior to presentation  Patient awake, not responding to questions, not following commands, repetitive speech saying "play", moving all extremities nonpurposefully, no facial droop of overt localizable weakness noted  Initial BP on arrival was Blood Pressure: (!) 156/103  As a result of negative CTH, CTA, low suspicion for stroke patient was determined to not be a candidate for tPA  BP over last 24 hours: Blood Pressure: (!) 158/107  current BP: Blood Pressure: (!) 158/107     Imaging:   CTH: No acute [id number] abnormality    CTA: No significant carotid or vertebral artery stenosis  No focal intracranial stenosis or aneurysm     MRI: pending     Impression: current presentation likely secondary to polypharmacy given patient presentation negative CT and CTA    Plan:   Does NOT need to be admitted under the stroke pathway    Recommend only getting MRI Brain    Recommend consult to toxicology to address polypharmacy and drug interaction   Recommend IV fluids   Medical management as per primary team appreciated   PT/OT/Speech/PMR consults appreciated when able   Will continue to follow

## 2021-06-27 NOTE — ASSESSMENT & PLAN NOTE
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  Patient taking Ingrezza 80 mg QD      Plan:  · Restart home meds slowly in setting of acute encephalopathy  · Continue ingrezza 80 mg daily

## 2021-06-27 NOTE — ASSESSMENT & PLAN NOTE
Patient follows with Neurosurgery for chronic lower back pain  She had a thoracolumbar fusion years ago in [place]  She just recently had a nerve stimulator placed in February 2020  Home pain regimen includes oxycodone 5 mg daily, morphine 15 mg twice daily, pregabalin 225 mg daily, duloxetine 60 mg daily  Plan:  · In the setting of encephalopathy possibly secondary to polypharmacy, will hold all these medications at this time  · If the patient's pain persists, may consider introducing other forms of analgesia or slowly introducing some of her home medications  · Patient's mental status slowly improving although she remains aphasic, will restart some pain regimen in the setting of severe chronic pain/opioid withdral  · Home Cymbalta 60 mg daily restarted for pain  · lyrica 100 mg BID started  · 6/30 oxycodone 5mg oral solution Q6H scheduled  · Patient started on home MsContin 15 mg Q12H for pain control    · OxyContin changed to 5 mg daily PRN  · Voltaren gel started   · Ophthalmic lubricating gel started for eye pain/dryness   · Tylenol 650 mg every 6 hours as needed for pain

## 2021-06-27 NOTE — ED PROVIDER NOTES
History  Chief Complaint   Patient presents with    Dystonic Reaction     [facility] with c/o tongue twitching and complaint that she cannot throw up  aphasic, confused, difficulty following commands on arrival  L side weakness      57-year-old female history of stroke, hypertension, and multiple psychiatric illnesses including anxiety, depression, bipolar on multiple antidepressants, antipsychotics, and anticholinergic medications presenting altered  Patient initially stroke alert secondary to concern for possible left lower extremity weakness and dysarthria  Patient also with lip and tongue some smacking that son later confirmed is chronic since her stroke  Last known well yesterday evening  Son found her this morning acutely altered  Review systems limited secondary to altered mental status  Prior to Admission Medications   Prescriptions Last Dose Informant Patient Reported? Taking?    [facility] Vitamin C 500 MG tablet 6/26/2021 at Unknown time  No Yes   Sig: TAKE 1 TABLET BY MOUTH TWICE A DAY   DULoxetine (CYMBALTA) 60 mg delayed release capsule Past Week at Unknown time  No Yes   Sig: TAKE 1 CAPSULE BY MOUTH EVERY DAY   Diapers & Supplies (Huggies Pull-Ups) MISC Not Taking at Unknown time  No No   Sig: Use 3 (three) times a day   Patient not taking: Reported on 6/27/2021   Diclofenac Sodium (VOLTAREN) 1 %   No No   Sig: Apply 2 g topically 4 (four) times a day   LORazepam (ATIVAN) 0 5 mg tablet 6/26/2021 at Unknown time  Yes Yes   Sig: Take by mouth every 6 (six) hours   Mouthwashes (Biotene Dry Mouth) LIQD 6/26/2021 at Unknown time  Yes Yes   Sig: Apply 5 mL to the mouth or throat daily   Multiple Vitamins-Minerals (multivitamin with minerals) tablet Not Taking at Unknown time Outside Facility (Specify) No No   Sig: Take 1 tablet by mouth daily   Patient not taking: Reported on 6/27/2021   Polyethylene Glycol 3350 (MIRALAX PO) 6/26/2021 at Unknown time Outside Facility (Specify) Yes Yes   Sig: Take by mouth   QUEtiapine (SEROquel) 300 mg tablet Not Taking at Unknown time Self Yes No   Sig: Take 300 mg by mouth daily at bedtime   Patient not taking: Reported on 6/27/2021   Respiratory Therapy Supplies (Nebulizer) [place] Nimisha Not Taking at Unknown time  No No   Sig: Use as needed (Shortness of breath)   Patient not taking: Reported on 4/20/2021   SIMETHICONE PO Not Taking at Unknown time Outside Facility ([address] [place] [address]) Yes No   Sig: Take by mouth as needed    Patient not taking: Reported on 6/27/2021   Sennosides (SENEXON PO) Not Taking at Unknown time Outside Facility ([address] [place] [address]) Yes No   Sig: Take by mouth   Patient not taking: Reported on 6/27/2021   Valbenazine Tosylate (Ingrezza) 80 MG CAPS 6/26/2021 at Unknown time  No Yes   Sig: Take 80 mg by mouth daily   acetaminophen (TYLENOL) 325 mg tablet 6/26/2021 at Unknown time  No Yes   Sig: Take 2 tablets (650 mg total) by mouth every 8 (eight) hours as needed for mild pain   amLODIPine (NORVASC) 5 mg tablet 6/26/2021 at Unknown time  No Yes   Sig: Take 1 tablet (5 mg total) by mouth daily   bisacodyl (DULCOLAX) 5 mg EC tablet Not Taking at Unknown time Outside Facility (Specify) Yes No   Sig: Take 5 mg by mouth daily as needed for constipation   Patient not taking: Reported on 6/27/2021   busPIRone (BUSPAR) 15 mg tablet   No No   Sig: Take 1 tablet (15 mg total) by mouth 3 (three) times a day   Patient not taking: Reported on 6/23/2021   cetirizine (ZyrTEC) 10 mg tablet Not Taking at Unknown time Outside Facility (Specify) Yes No   Sig: Take 10 mg by mouth as needed    Patient not taking: Reported on 6/27/2021   cholecalciferol (VITAMIN D3) 1,000 units tablet  Outside Facility (Specify) No No   Sig: Take 1 tablet (1,000 Units total) by mouth daily   ferrous sulfate 324 (65 Fe) mg 6/26/2021 at Unknown time  No Yes   Sig: TAKE 1 TABLET (324 MG TOTAL) BY MOUTH 2 (TWO) TIMES A DAY BEFORE MEALS   folic acid (FOLVITE) 1 mg tablet 6/26/2021 at Unknown time  No Yes Sig: TAKE 1 TABLET BY MOUTH EVERY DAY   hydrOXYzine HCL (ATARAX) 50 mg tablet 6/26/2021 at Unknown time  No Yes   Sig: TAKE 1 TABLET (50 MG TOTAL) BY MOUTH DAILY AT BEDTIME AS NEEDED FOR ANXIETY (INSOMNIA)   ipratropium-albuterol (DUO-NEB) 0 5-2 5 mg/3 mL nebulizer solution Not Taking at Unknown time  No No   Sig: Take 1 vial (3 mL total) by nebulization every 6 (six) hours as needed for wheezing or shortness of breath   Patient not taking: Reported on 6/10/2021   lidocaine (LMX) 4 % cream 6/26/2021 at Unknown time Outside Facility (Specify) No Yes   Sig: Apply topically as needed for mild pain   morphine (MS CONTIN) 15 mg 12 hr tablet 6/26/2021 at Unknown time  No Yes   Sig: Take 1 tablet (15 mg total) by mouth 2 (two) times a day Hold if sedatedMax Daily Amount: 30 mg   naloxone (NARCAN) 4 mg/0 1 mL nasal spray Unknown at Unknown time  No No   Sig: Administer 1 spray into a nostril  If no response after 2-3 minutes, give another dose in the other nostril using a new spray     Patient not taking: Reported on 6/10/2021   nortriptyline (PAMELOR) 10 mg capsule 6/26/2021 at Unknown time  No Yes   Sig: Take 1 capsule (10 mg total) by mouth daily at bedtime   omeprazole (PriLOSEC) 20 mg delayed release capsule   No No   Sig: Take 1 capsule (20 mg total) by mouth daily   ondansetron (ZOFRAN-ODT) 4 mg disintegrating tablet Not Taking at Unknown time Outside Facility (Specify) No No   Sig: Take 1 tablet (4 mg total) by mouth every 6 (six) hours as needed for nausea or vomiting   Patient not taking: Reported on 6/27/2021   orlistat (AYDEN) 60 MG capsule 6/26/2021 at Unknown time  Yes Yes   Sig: Take 60 mg by mouth 3 (three) times a day with meals   oxyCODONE (ROXICODONE) 5 mg immediate release tablet 6/26/2021 at Unknown time  No Yes   Sig: Take 1 tablet (5 mg total) by mouth dailyMax Daily Amount: 5 mg   oxybutynin (DITROPAN) 5 mg tablet   No No   Sig: Take 1 tablet (5 mg total) by mouth 2 (two) times a day   potassium chloride (MICRO-K) 10 MEQ CR capsule Not Taking at Unknown time Self Yes No   Sig: Take 10 mEq by mouth 2 (two) times a day Two tabs, two times daily   Patient not taking: Reported on 2021   prazosin (MINIPRESS) 2 mg capsule 2021 at Unknown time Outside Facility (Specify) No Yes   Sig: TAKE 1 CAPSULE BY MOUTH EVERY DAY   pregabalin (LYRICA) 225 MG capsule 2021 at Unknown time  No Yes   Sig: TAKE 1 CAPSULE (225 MG TOTAL) BY MOUTH EVERY 12 (TWELVE) HOURS   sodium chloride (OCEAN) 0 65 % nasal spray Not Taking at Unknown time  Yes No   Si sprays into each nostril as needed   Patient not taking: Reported on 2021   traZODone (DESYREL) 100 mg tablet 2021 at Unknown time Self Yes Yes   Sig: Take 100 mg by mouth daily at bedtime 2 tablets daily at bedtime   triamcinolone (KENALOG) 0 025 % cream Not Taking at Unknown time Outside Facility (Specify) No No   Sig: Apply topically 2 (two) times a day   Patient not taking: Reported on 2021      Facility-Administered Medications: None       Past Medical History:   Diagnosis Date    Acid reflux     Anxiety     RESOLVED: [id number]    Arthritis     Bipolar 2 disorder (HCC)     FOLLOWS WITH PSYCHIATRIST  CONTINUE LAMOTRIGINE; RESOLVED: [id number]    Depression     Familial tremor     both hands    Fibromyalgia     LAST ASSESSED: [id number]    Hearing aid worn     left ear    [place] (hard of hearing)     left ear    Hypertension     Left-sided weakness     Lower back pain     Memory loss of unknown cause     long and short term    Migraine     Obesity     Obesity, Class II, BMI 35-39 9     Overactive bladder     Panic attack     Post traumatic stress disorder     Seasonal allergies     Stroke [facility])     questionable stroke 2009    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    CONT ASA 81 MG DAILY; RESOLVED: [id number]    Urinary incontinence     Wears dentures     partial lower / full upper    Wears glasses Past Surgical History:   Procedure Laterality Date    BACK SURGERY       SECTION      COLONOSCOPY      RESOLVED: [id number]    EAR SURGERY      EGD      HYSTERECTOMY      MYRINGOTOMY W/ TUBES Left     NECK SURGERY  2019    OK CYSTOURETHROSCOPY N/A 2016    Procedure: CYSTOSCOPY, BOTOX INJECTION;  Surgeon: Jeferson Gomez MD;  Location: [facility];  Service: Gynecology    OK IMPLANT SPINAL NEUROSTIM/ Right 2/10/2021    Procedure: REPLACEMENT IMPLANTABLE PULSE GENERATOR DORSAL SPINAL COLUMN STIMULATOR, RIGHT;  Surgeon: Lazaro Kaminski MD;  [facility]: [facility];  Service: Neurosurgery    OK PERCUT IMPLNT [address] Right 2020    Procedure: INSERTION THORACIC DORSAL COLUMN SPINAL CORD STIMULATOR PERCUTANEOUS W IMPLANTABLE PULSE GENERATOR, RIGHT;  Surgeon: Lazaro Kaminski MD;  Location:  MAIN OR;  Service: Neurosurgery    [address] [place] [address]    UPPER GASTROINTESTINAL ENDOSCOPY  2020       Family History   Problem Relation Age of Onset    Colon cancer Mother     Alzheimer's disease Father     Stroke Father     Colon cancer Brother     Bipolar disorder Brother     Breast cancer Maternal Aunt     Colon cancer Maternal Aunt     Heart disease Other     Diabetes Other     Hypertension Other     Seizures Son     Depression Paternal Grandfather     No Known Problems Sister     No Known Problems Brother     Thyroid disease Neg Hx      I have reviewed and agree with the history as documented      E-Cigarette/Vaping    E-Cigarette Use Never User      E-Cigarette/Vaping Substances    Nicotine No     THC No     CBD No     Flavoring No     Other No     Unknown No      Social History     Tobacco Use    Smoking status: Never Smoker    Smokeless tobacco: Never Used   Vaping Use    Vaping Use: Never used   Substance Use Topics    Alcohol use: Not Currently     Comment: 2 x year; being a social drinker as per all scripts     Drug use: No        Review of Systems   Unable to perform ROS: Mental status change   Psychiatric/Behavioral: Positive for agitation and confusion  Physical Exam  ED Triage Vitals   Temperature Pulse Respirations Blood Pressure SpO2   06/27/21 1110 06/27/21 1100 06/27/21 1100 06/27/21 1100 06/27/21 1100   98 °F (36 7 °C) 104 (!) 30 (!) 156/103 99 %      Temp Source Heart Rate Source Patient Position - Orthostatic VS BP Location FiO2 (%)   06/27/21 1110 06/27/21 1100 06/28/21 0737 06/28/21 0737 --   Oral Monitor Lying Left arm       Pain Score       --                    Orthostatic Vital Signs  Vitals:    06/29/21 2214 06/30/21 0252 06/30/21 0300 06/30/21 0715   BP: 154/92 (!) 178/104 151/86 148/100   Pulse: 84 60  80   Patient Position - Orthostatic VS:           Physical Exam  Vitals and nursing note reviewed  Constitutional:       General: She is not in acute distress  Appearance: She is well-developed  She is ill-appearing  She is not toxic-appearing or diaphoretic  Comments: Alert but not oriented  Occasionally following commands  Tardive dyskinesia  Mainly incomprehensible sounds but protecting airway  Overall dry appearing, tachy, and urinary retention   HENT:      Head: Normocephalic and atraumatic  Nose: Nose normal  No congestion or rhinorrhea  Mouth/Throat:      Mouth: Mucous membranes are dry  Pharynx: Oropharynx is clear  No oropharyngeal exudate or posterior oropharyngeal erythema  Comments: Dry lips and tongue  Eyes:      General: No scleral icterus  Right eye: No discharge  Left eye: No discharge  Extraocular Movements: Extraocular movements intact  Conjunctiva/sclera: Conjunctivae normal       Pupils: Pupils are equal, round, and reactive to light  Neck:      Vascular: No JVD  Trachea: No tracheal deviation  Cardiovascular:      Rate and Rhythm: Regular rhythm  Tachycardia present  Heart sounds: Normal heart sounds   No murmur heard  No friction rub  No gallop  Comments: Tachy to low 100s and regular rhythm  Pulmonary:      Effort: Pulmonary effort is normal  No respiratory distress  Breath sounds: Normal breath sounds  No stridor  No wheezing or rales  Comments: CTAB  Chest:      Chest wall: No tenderness  Abdominal:      General: Bowel sounds are normal  There is distension  Palpations: Abdomen is soft  Tenderness: There is no abdominal tenderness  There is no right CVA tenderness, left CVA tenderness, guarding or rebound  Comments: Soft and NT  Normal bowel sounds  Suprapubic distention    Musculoskeletal:         General: No swelling, tenderness, deformity or signs of injury  Normal range of motion  Cervical back: Normal range of motion and neck supple  No rigidity  No muscular tenderness  Right lower leg: No edema  Left lower leg: No edema  Lymphadenopathy:      Cervical: No cervical adenopathy  Skin:     General: Skin is warm and dry  Coloration: Skin is not pale  Findings: No erythema or rash  Comments: Dry skin and dry mucous membranes   Neurological:      General: No focal deficit present  Mental Status: She is alert  Motor: No abnormal muscle tone  Comments: Alert but not oriented  Occasionally following commands such as holding up/moving extremities  Initial concern for LLE weakness but patient eventually able to hold LLE against gravity  Strength and sensation grossly intact  Not compliant with cranial nerve exam   Psychiatric:         Behavior: Behavior normal          Thought Content:  Thought content normal          ED Medications  Medications   amLODIPine (NORVASC) tablet 5 mg (5 mg Oral Given 6/30/21 0820)   cholecalciferol (VITAMIN D3) tablet 1,000 Units (1,000 Units Oral Given 6/30/21 0820)   ascorbic acid (VITAMIN C) tablet 500 mg (500 mg Oral Given 2/01/78 1105)   folic acid (FOLVITE) tablet 1,000 mcg (1,000 mcg Oral Given 6/30/21 0820)   prazosin (MINIPRESS) capsule 2 mg (2 mg Oral Given 6/30/21 0828)   enoxaparin (LOVENOX) subcutaneous injection 40 mg (40 mg Subcutaneous Given 6/29/21 1931)   ondansetron (ZOFRAN) injection 4 mg (has no administration in time range)   DULoxetine (CYMBALTA) delayed release capsule 60 mg (60 mg Oral Given 6/30/21 0820)   LORazepam (ATIVAN) injection 1 mg (1 mg Intravenous Given 6/30/21 0820)   loperamide (IMODIUM) oral liquid 2 mg (has no administration in time range)   Labetalol HCl (NORMODYNE) injection 10 mg (has no administration in time range)   lactated ringers infusion (125 mL/hr Intravenous New Bag 6/30/21 0659)   aspirin chewable tablet 81 mg (81 mg Oral Given 6/30/21 0820)   atorvastatin (LIPITOR) tablet 40 mg (40 mg Oral Given 6/29/21 1700)   potassium chloride oral solution 40 mEq (has no administration in time range)   omeprazole (PRILOSEC) suspension 2 mg/mL (has no administration in time range)   Valbenazine Tosylate CAPS 80 mg (has no administration in time range)   Diclofenac Sodium (VOLTAREN) 1 % topical gel 2 g (has no administration in time range)   acetaminophen (TYLENOL) tablet 650 mg (has no administration in time range)   oxyCODONE (ROXICODONE) oral solution 5 mg (has no administration in time range)   oxyCODONE (ROXICODONE) oral solution 5 mg (has no administration in time range)   morphine injection 2 mg (has no administration in time range)   iohexol (OMNIPAQUE) 350 MG/ML injection (MULTI-DOSE) 85 mL (85 mL Intravenous Given 6/27/21 1115)   sodium chloride 0 9 % bolus 1,000 mL (0 mL Intravenous Stopped 6/27/21 1351)   physostigmine salicylate (ANTILIRIUM) injection 1 mg (1 mg Intravenous Given 6/27/21 1310)   LORazepam (ATIVAN) injection 1 mg (1 mg Intravenous Given 6/27/21 1320)   ondansetron (ZOFRAN) injection 4 mg (4 mg Intravenous Given 6/27/21 1320)   LORazepam (ATIVAN) injection 1 mg (1 mg Intravenous Given 6/27/21 1400)   Labetalol HCl (NORMODYNE) injection 10 mg (10 mg Intravenous Given 6/28/21 0431)   potassium chloride 20 mEq IVPB (premix) (0 mEq Intravenous Stopped 6/29/21 0637)   potassium chloride 20 mEq IVPB (premix) (0 mEq Intravenous Stopped 6/29/21 0636)   potassium chloride (K-DUR,KLOR-CON) CR tablet 40 mEq (40 mEq Oral Given 6/29/21 1036)   potassium chloride (K-DUR,KLOR-CON) CR tablet 40 mEq (40 mEq Oral Given 6/29/21 1420)   potassium chloride 20 mEq IVPB (premix) (0 mEq Intravenous Stopped 6/30/21 0928)   potassium chloride 20 mEq IVPB (premix) (0 mEq Intravenous Stopped 6/29/21 1830)       Diagnostic Studies  Results Reviewed     Procedure Component Value Units Date/Time    Comprehensive metabolic panel [[id number]]  (Abnormal) Collected: 06/28/21 0609    Lab Status: Final result Specimen: Blood from Arm, Right Updated: 06/28/21 8374     Sodium 142 mmol/L      Potassium 2 9 mmol/L      Chloride 111 mmol/L      CO2 21 mmol/L      ANION GAP 10 mmol/L      BUN 8 mg/dL      Creatinine 0 84 mg/dL      Glucose 127 mg/dL      Calcium 9 1 mg/dL      AST 30 U/L      ALT 35 U/L      Alkaline Phosphatase 113 U/L      Total Protein 7 7 g/dL      Albumin 3 8 g/dL      Total Bilirubin 2 44 mg/dL      eGFR 89 ml/min/1 73sq m     Narrative:      [place] guidelines for Chronic Kidney Disease (CKD):     Stage 1 with normal or high GFR (GFR > 90 mL/min/1 73 square meters)    Stage 2 Mild CKD (GFR = 60-89 mL/min/1 73 square meters)    Stage 3A Moderate CKD (GFR = 45-59 mL/min/1 73 square meters)    Stage 3B Moderate CKD (GFR = 30-44 mL/min/1 73 square meters)    Stage 4 Severe CKD (GFR = 15-29 mL/min/1 73 square meters)    Stage 5 End Stage CKD (GFR <15 mL/min/1 73 square meters)  Note: GFR calculation is accurate only with a steady state creatinine    CBC and differential [[id number]]  (Abnormal) Collected: 06/28/21 0609    Lab Status: Final result Specimen: Blood from Arm, Right Updated: 06/28/21 0637     WBC 11 10 Thousand/uL      RBC 5 34 Million/uL      Hemoglobin 15 5 g/dL      Hematocrit 46 0 %      MCV 86 fL      MCH 29 0 pg      MCHC 33 7 g/dL      RDW 13 8 %      MPV 10 4 fL      Platelets 401 Thousands/uL      nRBC 0 /100 WBCs      Neutrophils Relative 83 %      Immat GRANS % 1 %      Lymphocytes Relative 9 %      Monocytes Relative 6 %      Eosinophils Relative 0 %      Basophils Relative 1 %      Neutrophils Absolute 9 28 Thousands/µL      Immature Grans Absolute 0 07 Thousand/uL      Lymphocytes Absolute 0 99 Thousands/µL      Monocytes Absolute 0 70 Thousand/µL      Eosinophils Absolute 0 01 Thousand/µL      Basophils Absolute 0 05 Thousands/µL     Ammonia [[id number]]  (Abnormal) Collected: 06/27/21 1914    Lab Status: Final result Specimen: Blood from Arm, Left Updated: 06/27/21 1937     Ammonia <10 umol/L     Rapid drug screen, urine [[id number]]  (Abnormal) Collected: 06/27/21 1218    Lab Status: Final result Specimen: Urine Updated: 06/27/21 1551     Amph/Meth UR Negative     Barbiturate Ur Negative     Benzodiazepine Urine Negative     Cocaine Urine Negative     Methadone Urine Negative     Opiate Urine Positive     PCP Ur Negative     THC Urine Negative     Oxycodone Urine Negative    Narrative:      Presumptive report  If requested, specimen will be sent to reference lab for confirmation  FOR MEDICAL PURPOSES ONLY  IF CONFIRMATION NEEDED PLEASE CONTACT THE LAB WITHIN 5 DAYS      Drug Screen Cutoff Levels:  AMPHETAMINE/METHAMPHETAMINES  1000 ng/mL  BARBITURATES     200 ng/mL  BENZODIAZEPINES     200 ng/mL  COCAINE      300 ng/mL  METHADONE      300 ng/mL  OPIATES      300 ng/mL  PHENCYCLIDINE     25 ng/mL  THC       50 ng/mL  OXYCODONE      100 ng/mL    Hepatic function panel [[id number]]  (Abnormal) Collected: 06/27/21 1217    Lab Status: Final result Specimen: Blood from Arm, Left Updated: 06/27/21 1543     Total Bilirubin 1 68 mg/dL      Bilirubin, Direct 0 43 mg/dL      Alkaline Phosphatase 120 U/L      AST 25 U/L      ALT 36 U/L Total Protein 8 1 g/dL      Albumin 4 1 g/dL     Platelet count [[id number]]     Lab Status: No result Specimen: Blood     Urine Microscopic [[id number]]  (Abnormal) Collected: 06/27/21 1218    Lab Status: Final result Specimen: Urine, Clean Catch Updated: 06/27/21 1300     RBC, UA None Seen /hpf      WBC, UA 4-10 /hpf      Epithelial Cells Occasional /hpf      Bacteria, UA None Seen /hpf      Hyaline Casts, UA 3-5 /lpf     Ethanol [[id number]]  (Normal) Collected: 06/27/21 1217    Lab Status: Final result Specimen: Blood from Arm, Left Updated: 06/27/21 1251     Ethanol Lvl <3 mg/dL     Acetaminophen level-If concentration is detectable, please discuss with medical  on call   [[id number]]  (Abnormal) Collected: 06/27/21 1217    Lab Status: Final result Specimen: Blood from Arm, Left Updated: 06/27/21 1251     Acetaminophen Level <2 ug/mL     Salicylate level [[id number]]  (Abnormal) Collected: 06/27/21 1217    Lab Status: Final result Specimen: Blood from Arm, Left Updated: 23/68/25 0595     Salicylate Lvl <3 mg/dL     Blood gas, venous [[id number]]  (Abnormal) Collected: 06/27/21 1217    Lab Status: Final result Specimen: Blood from Arm, Left Updated: 06/27/21 1227     pH, Tez 7 615     pCO2, Tez 20 2 mm Hg      pO2, Tez 29 5 mm Hg      HCO3, Tez 20 1 mmol/L      Base Excess, Tez 1 6 mmol/L      O2 Content, Tez 16 8 ml/dL      O2 HGB, VENOUS 71 8 %     Urine Macroscopic, POC [[id number]]  (Abnormal) Collected: 06/27/21 1218    Lab Status: Final result Specimen: Urine Updated: 06/27/21 1220     Color, UA Yellow     Clarity, UA Clear     pH, UA 8 5     Leukocytes, UA Small     Nitrite, UA Negative     Protein, UA Negative mg/dl      Glucose, UA Negative mg/dl      Ketones, UA Negative mg/dl      Urobilinogen, UA 0 2 E U /dl      Bilirubin, UA Negative     Blood, UA Trace     Specific [place], UA 1 015    Narrative:      CLINITEK RESULT    Novel Coronavirus (Covid-19),PCR [facility] - 2 hour stat [[id number]] (Normal) Collected: 06/27/21 1102    Lab Status: Final result Specimen: Nares from Nose Updated: 06/27/21 1207     SARS-CoV-2 Negative    Narrative:           Kaci Delgadillo [[id number]]  (Normal) Collected: 06/27/21 1102    Lab Status: Final result Specimen: Blood from Arm, Left Updated: 06/27/21 1141     Protime 12 6 seconds      INR 0 94    APTT [[id number]]  (Normal) Collected: 06/27/21 1102    Lab Status: Final result Specimen: Blood from Arm, Left Updated: 06/27/21 1141     PTT 30 seconds     Troponin I [[id number]]  (Normal) Collected: 06/27/21 1102    Lab Status: Final result Specimen: Blood from Arm, Left Updated: 06/27/21 1133     Troponin I <0 02 ng/mL     Basic metabolic panel [[id number]]  (Abnormal) Collected: 06/27/21 1102    Lab Status: Final result Specimen: Blood from Arm, Left Updated: 06/27/21 1128     Sodium 134 mmol/L      Potassium 4 1 mmol/L      Chloride 105 mmol/L      CO2 19 mmol/L      ANION GAP 10 mmol/L      BUN 6 mg/dL      Creatinine 0 98 mg/dL      Glucose 129 mg/dL      Calcium 9 5 mg/dL      eGFR 74 ml/min/1 73sq m     Narrative:      [place] guidelines for Chronic Kidney Disease (CKD):     Stage 1 with normal or high GFR (GFR > 90 mL/min/1 73 square meters)    Stage 2 Mild CKD (GFR = 60-89 mL/min/1 73 square meters)    Stage 3A Moderate CKD (GFR = 45-59 mL/min/1 73 square meters)    Stage 3B Moderate CKD (GFR = 30-44 mL/min/1 73 square meters)    Stage 4 Severe CKD (GFR = 15-29 mL/min/1 73 square meters)    Stage 5 End Stage CKD (GFR <15 mL/min/1 73 square meters)  Note: GFR calculation is accurate only with a steady state creatinine    CBC and Platelet [[id number]]  (Abnormal) Collected: 06/27/21 1102    Lab Status: Final result Specimen: Blood from Arm, Left Updated: 06/27/21 1113     WBC 6 36 Thousand/uL      RBC 5 63 Million/uL      Hemoglobin 16 7 g/dL      Hematocrit 48 4 %      MCV 86 fL      MCH 29 7 pg      MCHC 34 5 g/dL      RDW 13 2 % Platelets 719 Thousands/uL      MPV 9 6 fL                  CT head wo contrast   Final Result by Charlie Del Cid MD (06/29 1406)      Grossly stable exam   No acute CT abnormality  Workstation performed: [id number]         CTA stroke alert (head/neck)   Final Result by Rocio Caban DO (06/27 1144)      No significant carotid or vertebral artery stenosis  No focal intracranial stenosis or aneurysm  Findings were directly discussed with Dr Casey Alvarez on 6/27/2021 11:27 AM                      Workstation performed: [id number]         CT stroke alert brain   Final Result by Rocio Caban DO (06/27 1145)      No acute intracranial abnormality  Findings were directly discussed with Dr Casey Alvarez on 6/27/2021 11:27 AM          Workstation performed: [id number]         XR chest portable    (Results Pending)   US right upper quadrant    (Results Pending)         Procedures  ECG 12 Lead Documentation Only    Date/Time: 6/27/2021 11:16 AM  Performed by: Hilaria Kruse MD  Authorized by: Hilaria Kruse MD     Indications / Diagnosis:  AMS  ECG reviewed by me, the ED Provider: yes    Patient location:  ED  Previous ECG:     Previous ECG:  Compared to current    Comparison to cardiac monitor: Yes    Interpretation:     Interpretation: non-specific    Rate:     ECG rate:  64    ECG rate assessment: normal    Rhythm:     Rhythm: sinus rhythm    Ectopy:     Ectopy: none    QRS:     QRS axis:  Normal    QRS intervals:  Normal  Conduction:     Conduction: normal    ST segments:     ST segments:  Non-specific  T waves:     T waves: non-specific            ED Course                         Initial Sepsis Screening     Row Name 06/27/21 1300                Is the patient's history suggestive of a new or worsening infection?   No  -CE        Suspected source of infection  --        Are two or more of the following signs & symptoms of infection both present and new to the patient?  --        Indicate SIRS criteria  --        If the answer is yes to both questions, suspicion of sepsis is present  --        If severe sepsis is present AND tissue hypoperfusion perists in the hour after fluid resuscitation or lactate > 4, the patient meets criteria for SEPTIC SHOCK  --        Are any of the following organ dysfunction criteria present within 6 hours of suspected infection and SIRS criteria that are NOT considered to be chronic conditions? --        Organ dysfunction  --        Date of presentation of severe sepsis  --        Time of presentation of severe sepsis  --        Tissue hypoperfusion persists in the hour after crystalloid fluid administration, evidenced, by either:  --        Was hypotension present within one hour of the conclusion of crystalloid fluid administration?  --        Date of presentation of septic shock  --        Time of presentation of septic shock  --          User Key  (r) = Recorded By, (t) = Taken By, (c) = Cosigned By    [address] Name Provider Prashant Perez MD Resident          SBIRT 20yo+      Most Recent Value   SBIRT (25 yo +)   In order to provide better care to our patients, we are screening all of our patients for alcohol and drug use  Would it be okay to ask you these screening questions? Yes [family] Filed at: 06/27/2021 1344   Initial Alcohol Screen: US AUDIT-C    1  How often do you have a drink containing alcohol?  0 Filed at: 06/27/2021 1344   2  How many drinks containing alcohol do you have on a typical day you are drinking? 0 Filed at: 06/27/2021 1344   3a  Male UNDER 65: How often do you have five or more drinks on one occasion? 0 Filed at: 06/27/2021 1344   3b  FEMALE Any Age, or MALE 65+: How often do you have 4 or more drinks on one occassion? 0 Filed at: 06/27/2021 1344   Audit-C Score  0 Filed at: 06/27/2021 1344   CAMRON: How many times in the past year have you    Used an illegal drug or used a prescription medication for non-medical reasons?   Never Filed at: 06/27/2021 1344                [facility]  Number of Diagnoses or Management Options  Altered mental status  Diagnosis management comments: 68-year-old female history of stroke, hypertension, and multiple psychiatric illnesses including anxiety, depression, bipolar on multiple antidepressants, antipsychotics, and anticholinergic medications presenting altered  Last known well last night  Given dysarthria and possible LLE weakness in setting of AMS, stroke alert  Also concern for polysubstance/polypharmacy or psych given medication list with substantial chance of significant side effects and known psych history  Stroke workup and tox workup  Straight to CT for stroke imaging  Initial stroke alert and CT stroke protocol imaging negative for acute pathology  Son confirmed at bedside that lip and tongue smacking normal for patient  Return at this point for psych verses medication side effect given length of medications with significant side effects including anticholinergic syndrome  Labs notable for pH of 7 6 likely secondary to tachypnea from agitation  Coma panel negative  Patient found to have urinary retention with relief of 800 mL initially and then 400 within the next hour  Patient also dry on exam and given confusion and urinary retention and mild tachycardia, concerning for possible cholinergic syndrome  Toxicology consult  Given 1 mg physostigmine  Patient had increased mouth and eye secretions and started to have some sweating but patient got acutely anxious and heart rate went up into the 140s  Heart rate and agitation improved with IV benzodiazepines  Patient still altered and alert but resting much more comfortably  Polypharmacy vs psych  Discussed with MRI but patient with spinal stimulator and unknown compatibility so not eligible for urgent MRI at this time  Admit for stroke pathway with Neurology and toxicology following           Amount and/or Complexity of Data Reviewed  Clinical lab tests: reviewed and ordered  Tests in the radiology section of CPT®: ordered and reviewed  Tests in the medicine section of CPT®: reviewed and ordered  Obtain history from someone other than the patient: yes  Review and summarize past medical records: yes  Discuss the patient with other providers: yes  Independent visualization of images, tracings, or specimens: yes    Risk of Complications, Morbidity, and/or Mortality  Presenting problems: high  Diagnostic procedures: high  Management options: high    Patient Progress  Patient progress: stable      Disposition  Final diagnoses: Altered mental status   Encephalopathy   Tardive dyskinesia     Time reflects when diagnosis was documented in both MDM as applicable and the Disposition within this note     Time User Action Codes Description Comment    6/27/2021 12:03 PM Chiquis Fox Add [R41 82] Altered mental status     6/28/2021 10:13 AM Verlena Pro Add [G93 40] Encephalopathy     6/30/2021 10:41 AM Chiquiseri Shenson Modify [G93 40] Encephalopathy     6/30/2021 10:41 AM Chiquis Fox Add [G24 01] Tardive dyskinesia       ED Disposition     ED Disposition Condition Date/Time Comment    Admit Stable Sun Jun 27, 2021  2:13 PM Case was discussed with SOD and the patient's admission status was agreed to be Admission Status: inpatient status to the service of Dr Romeo Watkins           Follow-up Information    None         Current Discharge Medication List      START taking these medications    Details   Diclofenac Sodium (VOLTAREN) 1 % Apply 2 g topically 4 (four) times a day  Qty: 150 g, Refills: 0    Associated Diagnoses: Bilateral chronic knee pain         CONTINUE these medications which have NOT CHANGED    Details   acetaminophen (TYLENOL) 325 mg tablet Take 2 tablets (650 mg total) by mouth every 8 (eight) hours as needed for mild pain  Qty: 60 tablet, Refills: 2    Associated Diagnoses: Ambulatory dysfunction; Lumbar radiculopathy      amLODIPine (NORVASC) 5 mg tablet Take 1 tablet (5 mg total) by mouth daily  Qty: 90 tablet, Refills: 3    Associated Diagnoses: Hypertension, unspecified type      [employer] Vitamin C 500 MG tablet TAKE 1 TABLET BY MOUTH TWICE A DAY  Qty: 60 tablet, Refills: 0    Associated Diagnoses: Primary osteoarthritis of right knee; Preop testing      DULoxetine (CYMBALTA) 60 mg delayed release capsule TAKE 1 CAPSULE BY MOUTH EVERY DAY  Qty: 30 capsule, Refills: 0    Associated Diagnoses: Generalized anxiety disorder      ferrous sulfate 324 (65 Fe) mg TAKE 1 TABLET (324 MG TOTAL) BY MOUTH 2 (TWO) TIMES A DAY BEFORE MEALS  Qty: 60 tablet, Refills: 1    Associated Diagnoses: Primary osteoarthritis of right knee; Preop testing      folic acid (FOLVITE) 1 mg tablet TAKE 1 TABLET BY MOUTH EVERY DAY  Qty: 30 tablet, Refills: 1    Associated Diagnoses: Primary osteoarthritis of right knee; Preop testing      hydrOXYzine HCL (ATARAX) 50 mg tablet TAKE 1 TABLET (50 MG TOTAL) BY MOUTH DAILY AT BEDTIME AS NEEDED FOR ANXIETY (INSOMNIA)  Qty: 90 tablet, Refills: 0    Associated Diagnoses: Anxiety      lidocaine (LMX) 4 % cream Apply topically as needed for mild pain  Qty: 30 g, Refills: 0    Associated Diagnoses: Lumbar radiculopathy      LORazepam (ATIVAN) 0 5 mg tablet Take by mouth every 6 (six) hours      morphine (MS CONTIN) 15 mg 12 hr tablet Take 1 tablet (15 mg total) by mouth 2 (two) times a day Hold if sedatedMax Daily Amount: 30 mg  Qty: 30 tablet, Refills: 0    Comments: PDMP checked and verified    Associated Diagnoses: Lumbar radiculopathy; Chronic pain disorder      Mouthwashes (Biotene Dry Mouth) LIQD Apply 5 mL to the mouth or throat daily      nortriptyline (PAMELOR) 10 mg capsule Take 1 capsule (10 mg total) by mouth daily at bedtime  Qty: 7 capsule, Refills: 0    Associated Diagnoses: Insomnia      orlistat (AYDEN) 60 MG capsule Take 60 mg by mouth 3 (three) times a day with meals      oxyCODONE (ROXICODONE) 5 mg immediate release tablet Take 1 tablet (5 mg total) by mouth dailyMax Daily Amount: 5 mg  Qty: 15 tablet, Refills: 0    Associated Diagnoses: Lumbar radiculopathy; Chronic pain disorder      Polyethylene Glycol 3350 (MIRALAX PO) Take by mouth      prazosin (MINIPRESS) 2 mg capsule TAKE 1 CAPSULE BY MOUTH EVERY DAY  Qty: 30 capsule, Refills: 1    Associated Diagnoses: Hypertension, unspecified type      pregabalin (LYRICA) 225 MG capsule TAKE 1 CAPSULE (225 MG TOTAL) BY MOUTH EVERY 12 (TWELVE) HOURS  Qty: 60 capsule, Refills: 0    Comments: Not to exceed 5 additional fills before 09/05/2021 DX Code Needed  PT REQUEST REFILL    Associated Diagnoses: Lumbar radiculopathy; Chronic pain disorder      traZODone (DESYREL) 100 mg tablet Take 100 mg by mouth daily at bedtime 2 tablets daily at bedtime      Valbenazine Tosylate (Ingrezza) 80 MG CAPS Take 80 mg by mouth daily  Qty: 30 capsule, Refills: 1    Associated Diagnoses: Tardive dyskinesia      bisacodyl (DULCOLAX) 5 mg EC tablet Take 5 mg by mouth daily as needed for constipation      busPIRone (BUSPAR) 15 mg tablet Take 1 tablet (15 mg total) by mouth 3 (three) times a day  Qty: 90 tablet, Refills: 0    Associated Diagnoses: Generalized anxiety disorder      cetirizine (ZyrTEC) 10 mg tablet Take 10 mg by mouth as needed       cholecalciferol (VITAMIN D3) 1,000 units tablet Take 1 tablet (1,000 Units total) by mouth daily  Qty: 90 tablet, Refills: 5    Associated Diagnoses: Vitamin D deficiency      Diapers & Supplies (Huggies Pull-Ups) MISC Use 3 (three) times a day  Qty: 100 each, Refills: 3    Associated Diagnoses: Urge incontinence of urine      ipratropium-albuterol (DUO-NEB) 0 5-2 5 mg/3 mL nebulizer solution Take 1 vial (3 mL total) by nebulization every 6 (six) hours as needed for wheezing or shortness of breath  Qty: 3 mL, Refills: 2    Associated Diagnoses: Dyspnea      Multiple Vitamins-Minerals (multivitamin with minerals) tablet Take 1 tablet by mouth daily  Qty: 30 tablet, Refills: 1    Associated Diagnoses: Primary osteoarthritis of right knee; Preop testing      naloxone (NARCAN) 4 mg/0 1 mL nasal spray Administer 1 spray into a nostril  If no response after 2-3 minutes, give another dose in the other nostril using a new spray  Qty: 1 each, Refills: 1    Associated Diagnoses: Lumbar radiculopathy; Chronic pain disorder      omeprazole (PriLOSEC) 20 mg delayed release capsule Take 1 capsule (20 mg total) by mouth daily  Qty: 90 capsule, Refills: 3    Associated Diagnoses: Gastroesophageal reflux disease without esophagitis      ondansetron (ZOFRAN-ODT) 4 mg disintegrating tablet Take 1 tablet (4 mg total) by mouth every 6 (six) hours as needed for nausea or vomiting  Qty: 20 tablet, Refills: 0    Associated Diagnoses: Vomiting      oxybutynin (DITROPAN) 5 mg tablet Take 1 tablet (5 mg total) by mouth 2 (two) times a day  Qty: 180 tablet, Refills: 3    Associated Diagnoses: Urge incontinence of urine      potassium chloride (MICRO-K) 10 MEQ CR capsule Take 10 mEq by mouth 2 (two) times a day Two tabs, two times daily      QUEtiapine (SEROquel) 300 mg tablet Take 300 mg by mouth daily at bedtime      Respiratory Therapy Supplies (Nebulizer) YUDY Use as needed (Shortness of breath)  Qty: 1 each, Refills: 0    Associated Diagnoses: Dyspnea      Sennosides (SENEXON PO) Take by mouth      SIMETHICONE PO Take by mouth as needed       sodium chloride (OCEAN) 0 65 % nasal spray 2 sprays into each nostril as needed      triamcinolone (KENALOG) 0 025 % cream Apply topically 2 (two) times a day  Qty: 30 g, Refills: 0    Associated Diagnoses: Rash           No discharge procedures on file  PDMP Review       Value Time User    PDMP Reviewed  Yes 6/10/2021 10:15 AM Mike Crowder DO           ED Provider  Attending physically available and evaluated Samantha Rodriguez I managed the patient along with the ED Attending      Electronically Signed by         Ashley Weathers MD  06/30/21 9822

## 2021-06-27 NOTE — ASSESSMENT & PLAN NOTE
Patient presented altered, initial concern for polypharmacy, all home medications were stopped on admission, some home pain/anxiety meds have been added back slowly in the setting of improving mental status  Per her son, the patient woke up on the morning of 6/27 with altered mental status  She was incoherent and could not be redirected  Neurology consult in the emergency department for stroke rule out  CT of the head and CT angiogram of the head and neck negative for any intracranial abnormality  Doubt CVA at this time  Possible anticholinergic toxicity  Patient did not respond to physostigmine as anticipated  Doubt infectious causes with no leukocytosis, negative UA, and no fever  Possible eitiology:  Etiology at this point likely polypharmacy versus toxic metabolic vs  Acute stroke    Patient is continuing to improve    Will slowly add back home meds to control pain and anxiety    Plan:  · Medication reconciliation done with the patient's son with medications in hand  · Will discontinue a majority of medications including: hydroxyzine 50 mg QHS, nortriptyline 10 mg QHS, trazodone 200 mg QHS, oxybutynin 5 mg BID, pregabalin 225 mg QD, duloxetine 60 mg QD, morphine 15 mg BID, oxycodone 5 QD, Ingrezza 80 mg daily  · According to son, the patient is no longer taking:  Cetirizine 10 mg QD, buspirone 15 mg TID, quetiapine 300 mg QHS (has replaced this with lorazepam 0 5 mg Q6H)  · MRI of brain incompatible with spinal stimulator, will follow up with CT head was negative  · Echo for stroke pathway was normal with EF 61%  · B12 and RPR, HIV, and lyme negative  · Blood cultures negative at 24 hours, hepatic function panel positive for T bill 1 7, follow up T bill 1 5  · RUQ US showed possible 6mm hemangioma, no other abnormality noted  · EEG on 6/28: was consistent with moderate generalized non-specific encephalopathy, no electrographic seizures  · Toxicology, neurology and psychiatry consulted, appreciate recommendations  · Urinary retention protocol in place  · Home Cymbalta 60 mg daily restarted  · Lyrica increased to 100 mg BID  · Quetiapine increased to 100 mg daily   · Patient started on home regiment of MsContin 15 mg Q12H for pain control  · Home Buspar 15 mg TID restarted for anxiety   · Pt restarted on PO Ingrezza 80 mg qd, 7/3  Monitor closely for s/e     · Neurochecks every 4 hours  · No longer confused or agitated

## 2021-06-27 NOTE — CONSULTS
Consultation - Stroke   Samantha Schroeder Juliette 60 y o  female MRN: [id number]  Unit/Bed#: ED 01 Encounter: [id number]      Assessment/Plan     Aphasia  Assessment & Plan  Patient is a 60year old female with history of polypharmacy who presents as a stroke alert given new onset aphasia, dysarthria and LLE weakness  NIHSS of 9 mostly due to aphasia and drift of the LLE  LKW night prior to presentation  Patient awake, not responding to questions, not following commands, repetitive speech saying "play", moving all extremities nonpurposefully, no facial droop of overt localizable weakness noted  Initial BP on arrival was Blood Pressure: (!) 156/103  As a result of negative CTH, CTA, low suspicion for stroke patient was determined to not be a candidate for tPA  BP over last 24 hours: Blood Pressure: (!) 158/107  current BP: Blood Pressure: (!) 158/107     Imaging:   CTH: No acute [id number] abnormality    CTA: No significant carotid or vertebral artery stenosis  No focal intracranial stenosis or aneurysm   MRI: pending     Impression: current presentation likely secondary to polypharmacy given patient presentation negative CT and CTA    Plan:   Does NOT need to be admitted under the stroke pathway    Recommend only getting MRI Brain    Recommend consult to toxicology to address polypharmacy and drug interaction   Recommend IV fluids   Medical management as per primary team appreciated   PT/OT/Speech/PMR consults appreciated when able   Will continue to follow     TPA Decision: Patient not a TPA candidate  CTH, CTA negative, nonfocal exam       Recommendations for outpatient neurological follow up have yet to be determined      History of Present Illness     Reason for Consult / Principal Problem: Stroke alert   Hx and PE limited by: Aphasia  Patient last known well: Previous night   Stroke alert called: 1100  Neurology time of arrival: 1103  HPI: Fredy Sabillon is a 60 y o   female with history of 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, hx of polypharmacy, obesity, urinary incontinence, migraines, multiple psychiatric conditions, chronic back pain, hypertension who presents as a stroke alert with dysarthria, aphasia and LLE weakness  History gathered by son at bedside  Son states that patient last night was in her normal state of health  He denies her having any complaints last night  He saw her this morning around 7-8 am when he found during her normal state of drowsiness that she typically presents with in the mornings due to her sleeping medications  At 11 am son found her confused with difficulty answering questions, following commands and repetitiveness saying "I got to throw" as she grabbed her abdomen  When son yelled at her " Mom! Snap out of it!" She said "Stop yelling at me!" in a child like voice, then proceeded with the same behavior  Patient denies her throwing up  He denies an similar episode ever happening before  On arrival to the ED /108  NIH of 9 mostly limited secondary to aphasia and LLE drift  CTH and CTA negative for acute ischemic findings  Son states patient has history of anxiety previously [facility] for [facility]  Attending called and reviewed recent medications with patients pharmacy  List includes the following with last filled dates:  Oxycodone 5 mg daily- 6/25  Morphine 15 mg b i d - 625  Ativan 0 5 mg p r n  Q 6 H- 6/14  Quetiapine  200 mg q h s - 6/2  Duloxetine 30 mg b i d -6/2  Hydroxyzine  Pregabalin   Trazodone 200 qhs   Prazosin       Consults    Review of Systems   Unable to perform ROS: Mental status change       Historical Information   Past Medical History:   Diagnosis Date    Acid reflux     Anxiety     RESOLVED: [id number]    Arthritis     Bipolar 2 disorder (HCC)     FOLLOWS WITH PSYCHIATRIST   CONTINUE LAMOTRIGINE; RESOLVED: [id number]    Depression     Familial tremor     both hands    Fibromyalgia     LAST ASSESSED: [id number]    Hearing aid worn     left ear    [place] (hard of hearing)     left ear    Hypertension     Left-sided weakness     Lower back pain     Memory loss of unknown cause     long and short term    Migraine     Obesity     Obesity, Class II, BMI 35-39 9     Overactive bladder     Panic attack     Post traumatic stress disorder     Seasonal allergies     Stroke [facility])     questionable stroke 2009    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    CONT ASA 81 MG DAILY; RESOLVED: [id number]    Urinary incontinence     Wears dentures     partial lower / full upper    Wears glasses      Past Surgical History:   Procedure Laterality Date    BACK SURGERY       SECTION      COLONOSCOPY      RESOLVED: [id number]    EAR SURGERY      EGD      HYSTERECTOMY      MYRINGOTOMY W/ TUBES Left     NECK SURGERY  2019    TX CYSTOURETHROSCOPY N/A 2016    Procedure: CYSTOSCOPY, BOTOX INJECTION;  Surgeon: Juana Bird MD;  Location: [facility];  Service: Gynecology    TX IMPLANT SPINAL NEUROSTIM/ Right 2/10/2021    Procedure: REPLACEMENT IMPLANTABLE PULSE GENERATOR DORSAL SPINAL COLUMN STIMULATOR, RIGHT;  Surgeon: Ifeoma Gómez MD;  Location:  MAIN OR;  Service: Neurosurgery    TX PERCUT IMPLNT Scott Prim Right 2020    Procedure: INSERTION THORACIC DORSAL COLUMN SPINAL CORD STIMULATOR PERCUTANEOUS W IMPLANTABLE PULSE GENERATOR, RIGHT;  Surgeon: Ifeoma Gómez MD;  Location:  MAIN OR;  Service: Neurosurgery    [address] [place] [address]    UPPER GASTROINTESTINAL ENDOSCOPY  2020     Social History   Social History     Substance and Sexual Activity   Alcohol Use Not Currently    Comment: 2 x year; being a social drinker as per all scripts      Social History     Substance and Sexual Activity   Drug Use No     E-Cigarette/Vaping    E-Cigarette Use Never User      E-Cigarette/Vaping Substances    Nicotine No     THC No     CBD No     Flavoring No     Other No     Unknown No      Social History     Tobacco Use   Smoking Status Never Smoker   Smokeless Tobacco Never Used     Family History: non-contributory    Review of previous medical records was completed  Meds/Allergies   all current active meds have been reviewed    No Known Allergies    Objective   Vitals:Blood pressure (!) 158/107, pulse 76, temperature 98 °F (36 7 °C), temperature source Oral, resp  rate (!) 30, SpO2 100 %, not currently breastfeeding  ,There is no height or weight on file to calculate BMI  No intake or output data in the 24 hours ending 21 1220    Invasive Devices: Invasive Devices     Peripheral Intravenous Line            Peripheral IV 21 Left Antecubital <1 day                Physical Exam  Neurologic Exam     Patient awake, not responding to questions, not following commands, repetitive speech saying "play", moving all extremities nonpurposefully, no facial droop of overt localizable weakness noted     NIHSS:  1a Level of Consciousness: 1 = Not alert, but arousable with minimal stimulation   1b  LOC Questions: 2 = Answers neither correctly   1c  LOC Commands: 2 = Obeys neither correctly   2  Best Gaze: 0 = Normal   3  Visual: 0 = No visual field loss   4  Facial Palsy: 0=Normal symmetric movement   5a  Motor Right Arm: 0=No drift, limb holds 90 (or 45) degrees for full 10 seconds   5b  Motor Left Arm: 0=No drift, limb holds 90 (or 45) degrees for full 10 seconds   6a  Motor Right Le=No drift, limb holds 90 (or 45) degrees for full 10 seconds   6b  Motor Left Le=Some effort against gravity, limb cannot get to or maintain (if cured) 90 (or 45) degrees, drifts down to bed, but has some effort against gravity   7  Limb Ataxia:  0=Absent   8  Sensory: 0=Normal; no sensory loss   9  Best Language:  2=Severe aphasia; fragmentary expression, inference needed, cannot identify materials   10   Dysarthria: 2=Severe; patient speech is so slurred as to be unintelligible in the absence of or our of proportion to any dysphagia, or is mute/anarthric   11  Extinction and Inattention (formerly Neglect): 0=No abnormality   Total Score: 9     Time NIHSS was completed: 1130    Modified Hume Score:  4 (Moderately severe disability; unable to walk and attend to bodily needs without assistance)    Lab Results: I have personally reviewed pertinent reports  Imaging Studies: I have personally reviewed pertinent reports  EKG, Pathology, and Other Studies: I have personally reviewed pertinent reports  VTE Prophylaxis: Sequential compression device Gem Coleman)     Code Status: Prior  Advance Directive and Living Will:      Power of :    POLST:      Counseling / Coordination of Care  Total Critical Care time spent 60 minutes excluding procedures, teaching and family updates

## 2021-06-27 NOTE — QUICK NOTE
Reassessed the patient at bedside  As per the nursing staff patient has remained very calm since the last couple of hours  She is not reported to have any outbursts or agitations  A while ago she was assisted to the bedside commode and back to the bed  She has been following commands for the most part  Has not required any more ativan prn  On talking to the patient, she does not answer any questions  She does track motion with eye  She is confused, disoriented but not agitated at present  Resting comfortably on bed  There is no mention or reports of any SI/HI  Will d/c one to one obs which was initially placed due to uncontrolled agitation and safety  Monitor closely  Will continue iv ativan prn

## 2021-06-27 NOTE — ASSESSMENT & PLAN NOTE
Patient with a long history of chronic pain and opioid abuse  Current regimen includes oxycodone 5 mg daily and morphine 15 mg twice daily  UDS positive for opioids      Plan:  · Medications held in setting of acute encephalopathy, patient's mental status is improving and she is in significant pain, will add back some pain coverage  · Patient restarted on MsContin 15 mg Q12H  · Oxycodone 5 mg daily PRN  · Plan to change to MS contin tomorrow if patient does not develop worsening AMS

## 2021-06-27 NOTE — ASSESSMENT & PLAN NOTE
Patient presented hypertensive, resolved without intervention  Blood pressure is currently stable  Blood pressure spikes likely in the setting of anxiety/pain    Plan:  · Continue with amlodipine 5 mg q d    · Continue prazosin 2 mg daily  · Labetalol 10 mg IM PRN for SBP >160  · Monitor blood pressure daily

## 2021-06-27 NOTE — PROGRESS NOTES
[facility] Note   Unit/Bed # @DBLINK (LISA,25721)@ Encounter: [id number]  SOD Team A          Samantha QUINONEZ Erick Hernandez 60 y o  female [id number]       Patient seen and examined  Reviewed H&P per Dr Stacey Last  Agree with the assessment and plan except:     Assessment/Plan: Active Problems:    Aphasia     Patient is a 77-year-old female with past medical history including but not limited to multiple psychiatric conditions, remote CVA in 2010, tardive dyskinesia, chronic back pain, hypertension was brought into the ER by the son she was found to be actively confused in a m  Earlier today  As per the son she was at her baseline last night prior to going to sleep  When the son woke up in the morning found that the patient was actively confused, not following commands not reorientable and subsequently took her to ER  Most of the history is available from chart review  Patient herself does not provide any relevant information  On coming to the ER she was actively confused, agitated and difficult to reorient  She was also noted to have aphasia and LLE drift and subsequently stroke alert was called  Initially in ED she had NIH 9, initial imaging including CT/CTA negative for acute stroke  Later the son informed that aphasia and tongue smacking are likely chronic issue 2/2 to tardive dyskinesia  There are no reports of fever, SOB, cough, rigidity, jerking movement, incontinence, neck rigidity  On presentation to ER, she was seen by toxicology team and there was suspicion of anticholi intox  She was given physostigmine x1 with some improvement in aphasia but none in confusion  She was initially tachycardic and tachypneic which improved after ativan in ER  Labs suggestive of respi alkalosis likely 2/2 hyperventilation  Other labs within acceptable limits  The medication list was reconciled by Dr Stacey Last with the son who is the primary care giver at present  P/E:  Vitals WNL on my encounter    General: Alert, not able to assess orientation, does not respond to verbal commands  Eyes: Does track motion, dilated BL, does react to light  CVS: RRR, no BL pitting edema  RS: no tachypnea, comfortable on room air  CNS: confused, no gross weakness in 4 extremities, no rigidity or clonus  Psych: unable to assess    A/P:  1  Acute encephalopathy likely 2/2 polypharmacy  Med rec reviewed  Does not seem that there are any new medications started recently however she is on significant psych medications  Will follow toxicology recs and hold all psych medications and pain medications  Continue ativan prn and 1-1 obs for now  Patient has been much improved from agitation standpoint since receiving ativan and will consider dcing 1-1 if she continues to remain calm  Continue prn ativan  Consider psych consult in AM for recommendations on her medication management vs  Mental health facility placement  Keep NPO till safe to swallow  2  Aphasia  Unsure how much component is chronic vs  Acute  Initially stroke evaluation called, negative imaging for stroke  Neurology on board  Not a candidate for TPA  3  Suspected anti-cholindergic intoxication  Avoid anti-cholinergic meds  Follow toxicology recs  4  Urinary retention  Acute retention as she was found to have nearly 800 cc upon bladder scan in ER  S/p Stanton placement in ER  Will continue for now until mental status improved  5  Hypertension  Continue home medications  Currently within acceptable limits       Disposition:  INPATIENT     Expected LOS: Tien Antoine MD

## 2021-06-27 NOTE — ASSESSMENT & PLAN NOTE
Patient with an extensive history of anxiety  Recently signed 201 in May 2021 for anxiety attacks  Patient's current home regimen includes nortriptyline 10 mg daily, hydroxyzine 50 mg daily at bedtime, duloxetine 60 mg daily, and lorazepam 0 5 mg every 6 hours  Patient was previously taking buspirone and quetiapine, but since her admission for 201 she has not been taking them according to worse on       Plan:  · Restart pain medications slowly in setting of AMS  · Cymbalta 60 mg daily restarted 6/30  · Quetiapine increased to 100 mg daily  · Home buspar 15 mg TID restarted  · patient has lorazepam 0 5 Q8H scheduled

## 2021-06-27 NOTE — STROKE DOCUMENTATION
Pt keeps stating "I cant play," restless and agitated  Assisted to bathroom, unable to urinate  bladder scan >400  Order for straight cath by dr Rhona Angeles

## 2021-06-28 ENCOUNTER — APPOINTMENT (INPATIENT)
Dept: NEUROLOGY | Facility: CLINIC | Age: 56
DRG: 092 | End: 2021-06-28
Payer: COMMERCIAL

## 2021-06-28 DIAGNOSIS — G89.29 BILATERAL CHRONIC KNEE PAIN: Primary | ICD-10-CM

## 2021-06-28 DIAGNOSIS — M25.561 BILATERAL CHRONIC KNEE PAIN: Primary | ICD-10-CM

## 2021-06-28 DIAGNOSIS — M25.562 BILATERAL CHRONIC KNEE PAIN: Primary | ICD-10-CM

## 2021-06-28 LAB
ALBUMIN SERPL BCP-MCNC: 3.8 G/DL (ref 3.5–5)
ALP SERPL-CCNC: 113 U/L (ref 46–116)
ALT SERPL W P-5'-P-CCNC: 35 U/L (ref 12–78)
ANION GAP SERPL CALCULATED.3IONS-SCNC: 10 MMOL/L (ref 4–13)
AST SERPL W P-5'-P-CCNC: 30 U/L (ref 5–45)
BASOPHILS # BLD AUTO: 0.05 THOUSANDS/ΜL (ref 0–0.1)
BASOPHILS NFR BLD AUTO: 1 % (ref 0–1)
BILIRUB DIRECT SERPL-MCNC: 0.52 MG/DL (ref 0–0.2)
BILIRUB SERPL-MCNC: 2.44 MG/DL (ref 0.2–1)
BUN SERPL-MCNC: 8 MG/DL (ref 5–25)
C DIFF TOX B TCDB STL QL NAA+PROBE: NEGATIVE
CALCIUM SERPL-MCNC: 9.1 MG/DL (ref 8.3–10.1)
CAMPYLOBACTER DNA SPEC NAA+PROBE: NORMAL
CHLORIDE SERPL-SCNC: 111 MMOL/L (ref 100–108)
CO2 SERPL-SCNC: 21 MMOL/L (ref 21–32)
CREAT SERPL-MCNC: 0.84 MG/DL (ref 0.6–1.3)
EOSINOPHIL # BLD AUTO: 0.01 THOUSAND/ΜL (ref 0–0.61)
EOSINOPHIL NFR BLD AUTO: 0 % (ref 0–6)
ERYTHROCYTE [DISTWIDTH] IN BLOOD BY AUTOMATED COUNT: 13.8 % (ref 11.6–15.1)
GFR SERPL CREATININE-BSD FRML MDRD: 89 ML/MIN/1.73SQ M
GLUCOSE SERPL-MCNC: 127 MG/DL (ref 65–140)
HCT VFR BLD AUTO: 46 % (ref 34.8–46.1)
HGB BLD-MCNC: 15.5 G/DL (ref 11.5–15.4)
IMM GRANULOCYTES # BLD AUTO: 0.07 THOUSAND/UL (ref 0–0.2)
IMM GRANULOCYTES NFR BLD AUTO: 1 % (ref 0–2)
LYMPHOCYTES # BLD AUTO: 0.99 THOUSANDS/ΜL (ref 0.6–4.47)
LYMPHOCYTES NFR BLD AUTO: 9 % (ref 14–44)
MCH RBC QN AUTO: 29 PG (ref 26.8–34.3)
MCHC RBC AUTO-ENTMCNC: 33.7 G/DL (ref 31.4–37.4)
MCV RBC AUTO: 86 FL (ref 82–98)
MONOCYTES # BLD AUTO: 0.7 THOUSAND/ΜL (ref 0.17–1.22)
MONOCYTES NFR BLD AUTO: 6 % (ref 4–12)
NEUTROPHILS # BLD AUTO: 9.28 THOUSANDS/ΜL (ref 1.85–7.62)
NEUTS SEG NFR BLD AUTO: 83 % (ref 43–75)
NRBC BLD AUTO-RTO: 0 /100 WBCS
PLATELET # BLD AUTO: 290 THOUSANDS/UL (ref 149–390)
PMV BLD AUTO: 10.4 FL (ref 8.9–12.7)
POTASSIUM SERPL-SCNC: 2.9 MMOL/L (ref 3.5–5.3)
PROT SERPL-MCNC: 7.7 G/DL (ref 6.4–8.2)
RBC # BLD AUTO: 5.34 MILLION/UL (ref 3.81–5.12)
SALMONELLA DNA SPEC QL NAA+PROBE: NORMAL
SHIGA TOXIN STX GENE SPEC NAA+PROBE: NORMAL
SHIGELLA DNA SPEC QL NAA+PROBE: NORMAL
SODIUM SERPL-SCNC: 142 MMOL/L (ref 136–145)
WBC # BLD AUTO: 11.1 THOUSAND/UL (ref 4.31–10.16)

## 2021-06-28 PROCEDURE — 92610 EVALUATE SWALLOWING FUNCTION: CPT

## 2021-06-28 PROCEDURE — 80053 COMPREHEN METABOLIC PANEL: CPT | Performed by: STUDENT IN AN ORGANIZED HEALTH CARE EDUCATION/TRAINING PROGRAM

## 2021-06-28 PROCEDURE — 99223 1ST HOSP IP/OBS HIGH 75: CPT | Performed by: INTERNAL MEDICINE

## 2021-06-28 PROCEDURE — 87505 NFCT AGENT DETECTION GI: CPT | Performed by: STUDENT IN AN ORGANIZED HEALTH CARE EDUCATION/TRAINING PROGRAM

## 2021-06-28 PROCEDURE — 95816 EEG AWAKE AND DROWSY: CPT | Performed by: STUDENT IN AN ORGANIZED HEALTH CARE EDUCATION/TRAINING PROGRAM

## 2021-06-28 PROCEDURE — 99231 SBSQ HOSP IP/OBS SF/LOW 25: CPT | Performed by: PSYCHIATRY & NEUROLOGY

## 2021-06-28 PROCEDURE — 87493 C DIFF AMPLIFIED PROBE: CPT | Performed by: STUDENT IN AN ORGANIZED HEALTH CARE EDUCATION/TRAINING PROGRAM

## 2021-06-28 PROCEDURE — NC001 PR NO CHARGE: Performed by: INTERNAL MEDICINE

## 2021-06-28 PROCEDURE — 97163 PT EVAL HIGH COMPLEX 45 MIN: CPT

## 2021-06-28 PROCEDURE — 87177 OVA AND PARASITES SMEARS: CPT | Performed by: STUDENT IN AN ORGANIZED HEALTH CARE EDUCATION/TRAINING PROGRAM

## 2021-06-28 PROCEDURE — 97167 OT EVAL HIGH COMPLEX 60 MIN: CPT

## 2021-06-28 PROCEDURE — 87209 SMEAR COMPLEX STAIN: CPT | Performed by: STUDENT IN AN ORGANIZED HEALTH CARE EDUCATION/TRAINING PROGRAM

## 2021-06-28 PROCEDURE — 85025 COMPLETE CBC W/AUTO DIFF WBC: CPT | Performed by: STUDENT IN AN ORGANIZED HEALTH CARE EDUCATION/TRAINING PROGRAM

## 2021-06-28 PROCEDURE — 82248 BILIRUBIN DIRECT: CPT | Performed by: STUDENT IN AN ORGANIZED HEALTH CARE EDUCATION/TRAINING PROGRAM

## 2021-06-28 PROCEDURE — 89055 LEUKOCYTE ASSESSMENT FECAL: CPT | Performed by: STUDENT IN AN ORGANIZED HEALTH CARE EDUCATION/TRAINING PROGRAM

## 2021-06-28 PROCEDURE — NC001 PR NO CHARGE: Performed by: PSYCHIATRY & NEUROLOGY

## 2021-06-28 PROCEDURE — 95816 EEG AWAKE AND DROWSY: CPT

## 2021-06-28 RX ORDER — POTASSIUM CHLORIDE 14.9 MG/ML
20 INJECTION INTRAVENOUS
Status: COMPLETED | OUTPATIENT
Start: 2021-06-28 | End: 2021-06-29

## 2021-06-28 RX ORDER — LABETALOL 20 MG/4 ML (5 MG/ML) INTRAVENOUS SYRINGE
10 ONCE
Status: COMPLETED | OUTPATIENT
Start: 2021-06-28 | End: 2021-06-28

## 2021-06-28 RX ORDER — ONDANSETRON 2 MG/ML
4 INJECTION INTRAMUSCULAR; INTRAVENOUS EVERY 6 HOURS PRN
Status: DISCONTINUED | OUTPATIENT
Start: 2021-06-28 | End: 2021-07-05

## 2021-06-28 RX ORDER — POTASSIUM CHLORIDE 14.9 MG/ML
20 INJECTION INTRAVENOUS ONCE
Status: COMPLETED | OUTPATIENT
Start: 2021-06-28 | End: 2021-06-29

## 2021-06-28 RX ADMIN — ENOXAPARIN SODIUM 40 MG: 40 INJECTION SUBCUTANEOUS at 19:29

## 2021-06-28 RX ADMIN — POTASSIUM CHLORIDE 20 MEQ: 14.9 INJECTION, SOLUTION INTRAVENOUS at 16:13

## 2021-06-28 RX ADMIN — SODIUM CHLORIDE 125 ML/HR: 0.9 INJECTION, SOLUTION INTRAVENOUS at 06:09

## 2021-06-28 RX ADMIN — LORAZEPAM 1 MG: 2 INJECTION INTRAMUSCULAR; INTRAVENOUS at 04:23

## 2021-06-28 RX ADMIN — LORAZEPAM 1 MG: 2 INJECTION INTRAMUSCULAR; INTRAVENOUS at 19:13

## 2021-06-28 RX ADMIN — OXYCODONE HYDROCHLORIDE AND ACETAMINOPHEN 500 MG: 500 TABLET ORAL at 16:13

## 2021-06-28 RX ADMIN — LORAZEPAM 1 MG: 2 INJECTION INTRAMUSCULAR; INTRAVENOUS at 23:06

## 2021-06-28 RX ADMIN — SODIUM CHLORIDE 125 ML/HR: 0.9 INJECTION, SOLUTION INTRAVENOUS at 23:09

## 2021-06-28 RX ADMIN — POTASSIUM CHLORIDE 20 MEQ: 14.9 INJECTION, SOLUTION INTRAVENOUS at 14:05

## 2021-06-28 RX ADMIN — LABETALOL 20 MG/4 ML (5 MG/ML) INTRAVENOUS SYRINGE 10 MG: at 04:31

## 2021-06-28 RX ADMIN — LORAZEPAM 1 MG: 2 INJECTION INTRAMUSCULAR; INTRAVENOUS at 10:52

## 2021-06-28 RX ADMIN — POTASSIUM CHLORIDE 20 MEQ: 14.9 INJECTION, SOLUTION INTRAVENOUS at 08:06

## 2021-06-28 RX ADMIN — LORAZEPAM 1 MG: 2 INJECTION INTRAMUSCULAR; INTRAVENOUS at 20:19

## 2021-06-28 NOTE — ASSESSMENT & PLAN NOTE
Oral-facial tardive dyskinesia secondary to psychiatric medication use  On Ingrezza at home    · As Joana Lao is not on hospital formulary, family has brought up patient's home med which has been profiled  · Continue Ingrezza 80 mg QD

## 2021-06-28 NOTE — PROGRESS NOTES
NEUROLOGY RESIDENCY PROGRESS NOTE     Name: Ananth Dang   Age & Sex: 60 y o  female   MRN: [id number]  Unit/Bed#: -01   Encounter: [id number]    ASSESSMENT & PLAN     * Encephalopathy  Assessment & Plan  · Presenting with echolalia/pralalia, confusion, and possible initial RLE weakness  Last known normal around 10 PM the night before admission  Tardive dyskinesia chronic  Accompanying tachycardia, mucosal dryness, urinary retention, raising concerns for anticholinergic toxicity  · Patient on multiple psychiatric and pain outpatient medications  Currently holding medications, including hydroxyzine 50 mg QHS, nortriptyline 10 mg QHS, trazodone 200 mg QHS, oxybutynin 5 mg BID, pregabalin 225 mg QD, duloxetine 60 mg QD, morphine 15 mg BID, oxycodone 5 QD, Ingrezza 80 mg daily  · Toxicology consulted, and physostigmine administered, with partial relief in aphasia but not confusion  There was also increased secretions and diaphoresis afterwards  · CT/CTA negative, and lab workup revealed mild alkalosis likely secondary to hyperventilation  Tbil 1 68 and DBil 0 43, ,  Na 134  UA showed some leukocytes  On 6/28 lab workup, there is leukocytosis, hypokalemia of 2 9, and Tbil is now 2 44  · Plan:  · MRI of brain w/o contrast   · Routine EEG to evaluate for possible seizures  · Continue current medical plan of care per primary team  · Neurology will continue to evaluate the patient    Tardive dyskinesia  Assessment & Plan  · Chronic, involving tongue and mouth  Patient has been on multiple medications, including hydroxyzine and Ingrezza  · Currently holding psychiatric and pain medications  Hypertension  Assessment & Plan  · /103 on admission  Has been hypertensive yesterday with up to 178/122 at 11 AM  On Norvasc 5 mg and prazosin 2 mg daily and has had PRN labetalol overnight  · Current BP controlled    · Plan:  · Continue BP control per primary team    SUBJECTIVE   Patient was seen and examined at bedside today  Patient was noted to be intermittently tachycardic overnight, and received Labetalol 10 mg IV once  Additionally, patient had reportedly had an episode of vomiting  Currently the patient is still repeating "I want to play" and "I don't want to play" and has been attempting to climb OOB  Unfortunately,on evaluation, follows directions only partially and inconsistently  Review of Systems   Unable to perform ROS: Mental status change       OBJECTIVE     Patient ID: Samantha Madsen is a 60 y o  female  Vitals:    21 0537 21 0600 21 0602 21 0737   BP:   160/82 153/83   BP Location:    Left arm   Pulse: 61  63 74   Resp:    18   Temp:    97 8 °F (36 6 °C)   TempSrc:    Oral   SpO2: 98%  96% 100%   Weight:  91 7 kg (202 lb 1 6 oz)        Temperature:   Temp (24hrs), Av 8 °F (36 6 °C), Min:97 2 °F (36 2 °C), Max:98 6 °F (37 °C)    Temperature: 97 8 °F (36 6 °C)      Physical Exam  Vitals and nursing note reviewed  Constitutional:       General: She is not in acute distress  Appearance: Normal appearance  She is well-developed  HENT:      Head: Normocephalic and atraumatic  Right Ear: External ear normal       Left Ear: External ear normal       Nose: Nose normal       Mouth/Throat:      Mouth: Mucous membranes are moist    Eyes:      Extraocular Movements: Extraocular movements intact and EOM normal       Conjunctiva/sclera: Conjunctivae normal       Pupils: Pupils are equal, round, and reactive to light  Cardiovascular:      Rate and Rhythm: Normal rate and regular rhythm  Heart sounds: Normal heart sounds  Pulmonary:      Effort: Pulmonary effort is normal       Breath sounds: Normal breath sounds  Abdominal:      General: Abdomen is flat  Palpations: Abdomen is soft  Musculoskeletal:         General: Normal range of motion  Cervical back: Normal range of motion  Skin:     General: Skin is warm and dry     Neurological: General: No focal deficit present  Mental Status: She is alert  Neurologic Exam     Mental Status   Neuro exam limited by current mental status  Unable to assess orientation  Speech: echolalia/pralalia     Cranial Nerves     CN III, IV, VI   Pupils are equal, round, and reactive to light  Extraocular motions are normal      CN VII   Facial expression full, symmetric  CN XI   CN XI normal      CN XII   CN XII normal      Motor Exam   Muscle bulk: normal    Sensory Exam   Unable to assess sensation at this time  LABORATORY DATA     Labs: I have personally reviewed pertinent films in PACS  Results from last 7 days   Lab Units 06/28/21  0609 06/27/21  1102   WBC Thousand/uL 11 10* 6 36   HEMOGLOBIN g/dL 15 5* 16 7*   HEMATOCRIT % 46 0 48 4*   PLATELETS Thousands/uL 290 298   NEUTROS PCT % 83*  --    MONOS PCT % 6  --       Results from last 7 days   Lab Units 06/28/21  0609 06/27/21  1217 06/27/21  1102   SODIUM mmol/L 142  --  134*   POTASSIUM mmol/L 2 9*  --  4 1   CHLORIDE mmol/L 111*  --  105   CO2 mmol/L 21  --  19*   BUN mg/dL 8  --  6   CREATININE mg/dL 0 84  --  0 98   CALCIUM mg/dL 9 1  --  9 5   ALK PHOS U/L 113 120*  --    ALT U/L 35 36  --    AST U/L 30 25  --               Results from last 7 days   Lab Units 06/27/21  1102   INR  0 94   PTT seconds 30         Results from last 7 days   Lab Units 06/27/21  1102   TROPONIN I ng/mL <0 02       IMAGING & DIAGNOSTIC TESTING     Radiology Results: I have personally reviewed pertinent films in PACS    CTA stroke alert (head/neck)   Final Result by Iveth Mora DO (06/27 1148)      No significant carotid or vertebral artery stenosis  No focal intracranial stenosis or aneurysm              Findings were directly discussed with Dr Sally Meléndez on 6/27/2021 11:27 AM                      Workstation performed: [id number]         CT stroke alert brain   Final Result by Iveth Mora DO (06/27 1147)      No acute intracranial abnormality  Findings were directly discussed with Dr Mahesh Bullock on 6/27/2021 11:27 AM          Workstation performed: [id number]             Other Diagnostic Testing: I have personally reviewed pertinent films in PACS    ACTIVE MEDICATIONS     Current Facility-Administered Medications   Medication Dose Route Frequency    acetaminophen (TYLENOL) tablet 650 mg  650 mg Oral Q6H PRN    amLODIPine (NORVASC) tablet 5 mg  5 mg Oral Daily    ascorbic acid (VITAMIN C) tablet 500 mg  500 mg Oral BID    cholecalciferol (VITAMIN D3) tablet 1,000 Units  1,000 Units Oral Daily    enoxaparin (LOVENOX) subcutaneous injection 40 mg  40 mg Subcutaneous A25I    folic acid (FOLVITE) tablet 1,000 mcg  1,000 mcg Oral Daily    LORazepam (ATIVAN) injection 1 mg  1 mg Intravenous Q1H PRN    ondansetron (ZOFRAN) injection 4 mg  4 mg Intravenous Q6H PRN    pantoprazole (PROTONIX) EC tablet 40 mg  40 mg Oral Early Morning    prazosin (MINIPRESS) capsule 2 mg  2 mg Oral Daily    sodium chloride 0 9 % infusion  125 mL/hr Intravenous Continuous       Prior to Admission medications    Medication Sig Start Date End Date Taking?  Authorizing Provider   acetaminophen (TYLENOL) 325 mg tablet Take 2 tablets (650 mg total) by mouth every 8 (eight) hours as needed for mild pain 3/9/21  Yes Matt Rogers,    amLODIPine (NORVASC) 5 mg tablet Take 1 tablet (5 mg total) by mouth daily 6/5/21 9/3/21 Yes Benny Mendez MD   [facility] Vitamin C 500 MG tablet TAKE 1 TABLET BY MOUTH TWICE A DAY 4/14/21  Yes Benny Mendez MD   diclofenac sodium (VOLTAREN) 1 % APPLY 4 G TOPICALLY 4 (FOUR) TIMES A DAY 8/5/20  Yes Momo Landers,    DULoxetine (CYMBALTA) 60 mg delayed release capsule TAKE 1 CAPSULE BY MOUTH EVERY DAY 4/14/21  Yes Benny Mendez MD   ferrous sulfate 324 (65 Fe) mg TAKE 1 TABLET (324 MG TOTAL) BY MOUTH 2 (TWO) TIMES A DAY BEFORE MEALS 4/13/21  Yes Benny Mendez MD   folic acid (FOLVITE) 1 mg tablet TAKE 1 TABLET BY MOUTH EVERY DAY 4/14/21  Yes Noman Chacon MD   hydrOXYzine HCL (ATARAX) 50 mg tablet TAKE 1 TABLET (50 MG TOTAL) BY MOUTH DAILY AT BEDTIME AS NEEDED FOR ANXIETY (INSOMNIA) 6/3/21  Yes Noman Chacon MD   lidocaine (LMX) 4 % cream Apply topically as needed for mild pain 7/16/20  Yes Juan Diego Jolly,    LORazepam (ATIVAN) 0 5 mg tablet Take by mouth every 6 (six) hours 6/3/21  Yes Historical Provider, MD   morphine (MS CONTIN) 15 mg 12 hr tablet Take 1 tablet (15 mg total) by mouth 2 (two) times a day Hold if sedatedMax Daily Amount: 30 mg 6/25/21  Yes Curtis Mandujano MD   Mouthwashes (Biotene Dry Mouth) LIQD Apply 5 mL to the mouth or throat daily   Yes Historical Provider, MD   nortriptyline (PAMELOR) 10 mg capsule Take 1 capsule (10 mg total) by mouth daily at bedtime 4/16/21  Yes Cory Aldana,    orlistat (AYDEN) 60 MG capsule Take 60 mg by mouth 3 (three) times a day with meals   Yes Historical Provider, MD   oxyCODONE (ROXICODONE) 5 mg immediate release tablet Take 1 tablet (5 mg total) by mouth dailyMax Daily Amount: 5 mg 6/25/21  Yes Curtis aMndujano MD   Polyethylene Glycol 3350 (MIRALAX PO) Take by mouth   Yes Historical Provider, MD   prazosin (MINIPRESS) 2 mg capsule TAKE 1 CAPSULE BY MOUTH EVERY DAY 7/9/20  Yes Abiodun Hummel MD   pregabalin (LYRICA) 225 MG capsule TAKE 1 CAPSULE (225 MG TOTAL) BY MOUTH EVERY 12 (TWELVE) HOURS 4/29/21  Yes Danica Mcarthur,    traZODone (DESYREL) 100 mg tablet Take 100 mg by mouth daily at bedtime 2 tablets daily at bedtime   Yes Historical Provider, MD   Valbenazine Tosylate (Ingrezza) 80 MG CAPS Take 80 mg by mouth daily 3/17/21  Yes Noman Chacon MD   bisacodyl (DULCOLAX) 5 mg EC tablet Take 5 mg by mouth daily as needed for constipation  Patient not taking: Reported on 6/27/2021    Historical Provider, MD   busPIRone (BUSPAR) 15 mg tablet Take 1 tablet (15 mg total) by mouth 3 (three) times a day  Patient not taking: Reported on 6/23/2021 3/17/21 6/23/21  Noman Chacon MD cetirizine (ZyrTEC) 10 mg tablet Take 10 mg by mouth as needed   Patient not taking: Reported on 6/27/2021 11/19/20   Historical Provider, MD   cholecalciferol (VITAMIN D3) 1,000 units tablet Take 1 tablet (1,000 Units total) by mouth daily 8/4/20 6/23/21  Giancarlo Reaves MD   Diapers & Supplies (Huggies Pull-Ups) MISC Use 3 (three) times a day  Patient not taking: Reported on 6/27/2021 4/20/21   Lucian Agosto, DO   ipratropium-albuterol (DUO-NEB) 0 5-2 5 mg/3 mL nebulizer solution Take 1 vial (3 mL total) by nebulization every 6 (six) hours as needed for wheezing or shortness of breath  Patient not taking: Reported on 6/10/2021 3/29/21   Noel Brandon,    Multiple Vitamins-Minerals (multivitamin with minerals) tablet Take 1 tablet by mouth daily  Patient not taking: Reported on 6/27/2021 8/18/20   Page Juarez MD   naloxone [facility]) 4 mg/0 1 mL nasal spray Administer 1 spray into a nostril  If no response after 2-3 minutes, give another dose in the other nostril using a new spray    Patient not taking: Reported on 6/10/2021 3/29/21   Charles Davis, DO   omeprazole (PriLOSEC) 20 mg delayed release capsule Take 1 capsule (20 mg total) by mouth daily 3/17/21 6/15/21  Giancarlo Reaves MD   ondansetron (ZOFRAN-ODT) 4 mg disintegrating tablet Take 1 tablet (4 mg total) by mouth every 6 (six) hours as needed for nausea or vomiting  Patient not taking: Reported on 6/27/2021 9/17/20   Frankie Aldana,    oxybutynin (DITROPAN) 5 mg tablet Take 1 tablet (5 mg total) by mouth 2 (two) times a day 3/17/21 6/15/21  Giancarlo Reaves MD   potassium chloride (MICRO-K) 10 MEQ CR capsule Take 10 mEq by mouth 2 (two) times a day Two tabs, two times daily  Patient not taking: Reported on 6/27/2021    Historical Provider, MD   QUEtiapine (SEROquel) 300 mg tablet Take 300 mg by mouth daily at bedtime  Patient not taking: Reported on 6/27/2021    Historical Provider, MD   Respiratory Therapy Supplies (Nebulizer) YUDY Use as needed (Shortness of breath)  Patient not taking: Reported on 4/20/2021 3/29/21   Sultana Her DO   Sennosides (SENEXON PO) Take by mouth  Patient not taking: Reported on 6/27/2021    Historical Provider, MD   SIMETHICONE PO Take by mouth as needed   Patient not taking: Reported on 6/27/2021    Historical Provider, MD   sodium chloride (OCEAN) 0 65 % nasal spray 2 sprays into each nostril as needed  Patient not taking: Reported on 6/27/2021    Historical Provider, MD   triamcinolone (KENALOG) 0 025 % cream Apply topically 2 (two) times a day  Patient not taking: Reported on 6/27/2021 7/16/20   Bill Pascual DO         VTE Pharmacologic Prophylaxis: Enoxaparin (Lovenox)  VTE Mechanical Prophylaxis: sequential compression device    ==  Diyor MD [facility] Psych[facility] Resident, PGY-1

## 2021-06-28 NOTE — PLAN OF CARE
Problem: PHYSICAL THERAPY ADULT  Goal: Performs mobility at highest level of function for planned discharge setting  See evaluation for individualized goals  Description: Treatment/Interventions: Functional transfer training, LE strengthening/ROM, Therapeutic exercise, Endurance training, Bed mobility, Gait training  Equipment Recommended:  (TBD)       See flowsheet documentation for full assessment, interventions and recommendations  Note: Prognosis: Guarded  Problem List: Decreased strength, Decreased endurance, Impaired balance, Decreased mobility, Decreased cognition, Decreased safety awareness, Impaired judgement, Pain  Assessment: Pt is a 61 yo female admitted to [place] [address] on 6/27/2021 s/p AMS  Dx: encephalopathy, tardive dyskinesia, hypertension  Two patient identifiers were used to confirm  Per past PT notes, patient lives in a 1 story apartment with 2 SKY with her son  Pt required A with IADL's prior  Pt was I for ADL's and mobility  Pt owns a rollator  Pt's son works during the day  Pt's impairments include reduced mobility, high risk of falling, poor activity tolerance, restrained, reduced ability to follow commands, impulsive, poor sitting tolerance and balance, confusion, disoriented  These impairments limit the ability of the patient to perform mobility without increased assistance, return to PLOF and participate in everyday life activities  Pt would benefit from continued skilled therapy while in the hospital to improve overall mobility and work towards a safe d/c  Recommend discharge to rehab  At the end of the session the patient was left in seated position with call bell and phone within reach  The patient's AM-PAC Basic Mobility Inpatient Short Form Low Function Raw Score 13 , Standardized Score is 20  14  A standardized score less 42 9 suggests the patient may benefit from discharge to post-acute rehab services   Please also refer to the recommendation of the Physical Therapist for safe discharge planning  Pt placed back in bed with bed alarm on and posey placed  RN notified  Barriers to Discharge: Inaccessible home environment, Decreased caregiver support        PT Discharge Recommendation: Post acute rehabilitation services     PT - OK to Discharge: Yes    See flowsheet documentation for full assessment

## 2021-06-28 NOTE — ASSESSMENT & PLAN NOTE
· /103 on admission  On Norvasc 5 mg and prazosin 2 mg daily  · BP has remained persistently elevated  This morning noted to be 160/96      · Plan:  · Continue BP control per primary team

## 2021-06-28 NOTE — PLAN OF CARE
Problem: OCCUPATIONAL THERAPY ADULT  Goal: Performs self-care activities at highest level of function for planned discharge setting  See evaluation for individualized goals  Description: Treatment Interventions: ADL retraining, Functional transfer training, UE strengthening/ROM, Endurance training, Cognitive reorientation, Patient/family training, Equipment evaluation/education, Compensatory technique education, Activityengagement          See flowsheet documentation for full assessment, interventions and recommendations  Note: Limitation: Decreased ADL status, Decreased UE strength, Decreased Safe judgement during ADL, Decreased cognition, Decreased endurance, Decreased fine motor control, Decreased self-care trans, Decreased high-level ADLs  Prognosis: Fair  Assessment: Pt is a 60 y o  female who was admitted to [facility] on 6/27/2021 with Encephalopathy -pt found by son with +confusion, lip smacking, dysarthria and ?able L sided weakness  Pt's problem list also includes PMH of HTN, underlying neurological disorder, previous surgery and chronic pain, opioid dependence, anxiety, bipolar disorder, esophageal reflux,TD, prior CVA  At baseline pt was completing adls Violet, ambulating with rollator, son manages [de-identified] of iadls  Pt lives with son in apt  Currently pt requires max assist for overall ADLS and mod to max a x 2  for functional mobility/transfers  Pt currently presents with impairments in the following categories -behavioral pattern, difficulty performing ADLS, difficulty performing IADLS , limited insight into deficits, flat affect, decreased initiation and engagement  and health management  activity tolerance, endurance, standing balance/tolerance, sitting balance/tolerance, UE strength, arousal, memory, insight, safety , judgement , attention , sequencing , task initiation  and task termination    These impairments, as well as pt's fatigue, decreased caregiver support and risk for falls limit pt's ability to safely engage in all baseline areas of occupation, includingeating, grooming, bathing, dressing, toileting, functional mobility/transfers, community mobility, social participation  and leisure activities  From OT standpoint, recommend inpt rehab upon D/C  OT will continue to follow to address the below stated goals        OT Discharge Recommendation: Post acute rehabilitation services

## 2021-06-28 NOTE — PROGRESS NOTES
I tried to evaluate the patient, unable to assess at this moment, I will try tomorrow       Codie Ayala MD

## 2021-06-28 NOTE — QUICK NOTE
potassium this morning 2 9, patient is NPO, had a vomiting this morning, nonbilious nonbloody  Plan  IV potassium chloride 20 meq over 2 hours  Zofran IV q 6 p r n    Will follow and re-evaluate

## 2021-06-28 NOTE — PROGRESS NOTES
INTERNAL MEDICINE RESIDENCY PROGRESS NOTE     Name: Shoshana Bojorquez   Age & Sex: 60 y o  female   MRN: [id number]  Unit/Bed#: -01   Encounter: [id number]  Team: SOD Team A    PATIENT INFORMATION     Name: Shoshana Bojorquez   Age & Sex: 60 y o  female   MRN: [id number]  Hospital Stay Days: 1    ASSESSMENT/PLAN     Principal Problem:    Encephalopathy  Active Problems:    Chronic pain syndrome    Anxiety    Bipolar II disorder ([facility] )    Esophageal reflux    Hypertension    Opioid dependence ([facility] )    Tardive dyskinesia      * Encephalopathy  Assessment & Plan  Per her son, the patient woke up on the morning of 6/27 with altered mental status  She was incoherent and could not be redirected  Neurology consult in the emergency department for stroke rule out  CT of the head and CT angiogram of the head and neck negative for any intracranial abnormality  Doubt CVA at this time  Possible anticholinergic toxicity  Patient did not respond to physostigmine as anticipated  Doubt infectious causes with no leukocytosis, negative UA, and no fever  Doubt hepatic encephalopathy with no history of hepatitis or alcoholism  Concern for polypharmacy      Plan:  · Medication reconciliation done with the patient's son with medications in hand  · Will discontinue a majority of medications including: hydroxyzine 50 mg QHS, nortriptyline 10 mg QHS, trazodone 200 mg QHS, oxybutynin 5 mg BID, pregabalin 225 mg QD, duloxetine 60 mg QD, morphine 15 mg BID, oxycodone 5 QD, Ingrezza 80 mg daily  · According to son, the patient is no longer taking:  Cetirizine 10 mg QD, buspirone 15 mg TID, quetiapine 300 mg QHS (has replaced this with lorazepam 0 5 mg Q6H)  · MRI of the brain ordered and pending compatibility with pain pump  · Toxicology and Neurology following  · Psychiatry consulted  · Urinary retention protocol in place  · Lorazepam IV 1 mg Q1H for agitation  · Neurochecks every 4 hours  · Regulate sleep-wake cycle  · Monitor for improvement off medications  · Patient has been evaluated by speech for dysphagia, recommend level 1 puree diet    Tardive dyskinesia  Assessment & Plan  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  Patient taking Ingrezza 80 mg QD  Plan:  · Hold medication in the setting of acute encephalopathy    Opioid dependence ([address] )  Assessment & Plan  Patient with a long history of chronic pain and opioid abuse  Current regimen includes oxycodone 5 mg daily and morphine 15 mg twice daily  UDS positive for opioids  Plan:  · Hold these medications in the setting of acute encephalopathy    Hypokalemia  Assessment & Plan  Patient was noted to have a K of 2 9 on 6/28 which is down from 4 1 on 6 27  She has had a few episodes of loose bowel movements since she has been here and has been maintained NPO due to encephalitis   etiology of loose bowel movements likely related to opoid withdrawal     Plan  · Follow up stool studies: ova and parasite, fecal leukocyte, stool enteric panel  · C  Dif negative  · Patient was given ondansetron 4 mg Q6H PRN for nausea  · Patient is s/p 60 mEq total K repletion  · Continue to monitor BMP and repleat K as needed      Hypertension  Assessment & Plan  Patient presented hypertensive, resolved without intervention  Blood pressure is currently stable  Plan:  · Continue amlodipine 5 mg daily and prazosin 2 mg daily  · Monitor blood pressure daily    Esophageal reflux  Assessment & Plan  Currently stable  Home medication includes omeprazole 20 mg daily  Plan:  · Substitute pantoprazole 40 mg daily, omeprazole not on formulary    Bipolar II disorder [facility])  Assessment & Plan  Patient previously followed with Psychiatry, last visit in 2018  The patient recently had a stay in a behavior health unit in May 2021 after she signed 201 after coming to the emergency department with panic attacks    According to her son, after that encounter her quetiapine was discontinued and she was given lorazepam 0 5 mg every 6 hours  Her buspirone was also discontinued  Plan:  · Continue to monitor off these medications in the setting of her acute encephalopathy  · Psychiatry consulted     Anxiety  Assessment & Plan  Patient with an extensive history of anxiety  Recently signed 201 in May 2021 for anxiety attacks  Patient's current home regimen includes nortriptyline 10 mg daily, hydroxyzine 50 mg daily at bedtime, duloxetine 60 mg daily, and lorazepam 0 5 mg every 6 hours  Patient was previously taking buspirone and quetiapine, but since her admission for 201 she has not been taking them according to worse on  Plan:  · Continue to hold all these medications in the setting of acute encephalopathy possibly due to polypharmacy  · patient has lorazepam 1 mg Q1H PRN for agitation    Chronic pain syndrome  Assessment & Plan  Patient follows with Neurosurgery for chronic lower back pain  She had a thoracolumbar fusion years ago in [place]  She just recently had a nerve stimulator placed in February 2020  Home pain regimen includes oxycodone 5 mg daily, morphine 15 mg twice daily, pregabalin 225 mg daily, duloxetine 60 mg daily  Plan:  · In the setting of encephalopathy possibly secondary to polypharmacy, will hold all these medications at this time  · If the patient's pain persists, may consider introducing other forms of analgesia or slowly introducing some of her home medications  · Tylenol 650 mg every 6 hours as needed for pain      Disposition: Patient continues to be encephalopathic, continue current level of care    SUBJECTIVE     Patient seen and examined  Patient was tachycardic up to the 160s and hypertensive to SBP 160s overnight and had an episode of nonbloody non bilious vomiting  She was given labetalol 10mg and ativan 1mg once and the tachycardia resolved   The etiology of this episode was though likely to be due to benzo/opioid withdrawal   This morning the patient continued to have an altered Mental status, and was repeating the phrase "I can't play" throughout the interview  She responded yes when asked if she knew where she was, but was unable to state the location, stating only "I can't play"  ROS not possible due to mental status  OBJECTIVE     Vitals:    21 0537 21 0600 21 0602 21 0737   BP:   160/82 153/83   BP Location:    Left arm   Pulse: 61  63 74   Resp:    18   Temp:    97 8 °F (36 6 °C)   TempSrc:    Oral   SpO2: 98%  96% 100%   Weight:  91 7 kg (202 lb 1 6 oz)        Temperature:   Temp (24hrs), Av 7 °F (36 5 °C), Min:97 2 °F (36 2 °C), Max:98 6 °F (37 °C)    Temperature: 97 8 °F (36 6 °C)  Intake & Output:  I/O        07 -  07 07 -  07 07 -  0700    I V  (mL/kg)  1364 6 (14 9)     IV Piggyback  1000     Total Intake(mL/kg)  2364 6 (25 8)     Urine (mL/kg/hr)  1850     Emesis/NG output  0     Stool  0     Total Output  1850     Net  +514 6            Unmeasured Stool Occurrence  1 x     Unmeasured Emesis Occurrence  1 x         Weights:        Body mass index is 38 19 kg/m²  Weight (last 2 days)     Date/Time   Weight    21 0600   91 7 (202 1)            Physical Exam  Constitutional:       Appearance: She is not toxic-appearing  HENT:      Head: Normocephalic and atraumatic  Mouth/Throat:      Mouth: Mucous membranes are moist       Comments: Continuous tardive dyskinesia of lips and tongue   Eyes:      Conjunctiva/sclera: Conjunctivae normal    Cardiovascular:      Rate and Rhythm: Normal rate and regular rhythm  Pulses: Normal pulses  Heart sounds: Normal heart sounds  Pulmonary:      Effort: Pulmonary effort is normal       Breath sounds: Normal breath sounds  Abdominal:      Palpations: Abdomen is soft  Tenderness: There is abdominal tenderness  There is no guarding or rebound        Comments: Patient was clutching her lower abdomen intermittently  She responded "yes" when asked if she had pain to palpation over the suprapubic region  Musculoskeletal:      Right lower leg: No edema  Left lower leg: No edema  Skin:     General: Skin is warm and dry  Neurological:      Mental Status: She is alert  Comments: Unable to assess orientation as patient would only repeat the phrase "I Can't play"    She was able to respond 'yes' when asked if she knew where she was and what year it was, but she was unable to give an answer  Psychiatric:      Comments: Unable to assess mood or thought content       LABORATORY DATA     Labs: I have personally reviewed pertinent reports  Results from last 7 days   Lab Units 06/28/21  0609 06/27/21  1102   WBC Thousand/uL 11 10* 6 36   HEMOGLOBIN g/dL 15 5* 16 7*   HEMATOCRIT % 46 0 48 4*   PLATELETS Thousands/uL 290 298   NEUTROS PCT % 83*  --    MONOS PCT % 6  --       Results from last 7 days   Lab Units 06/28/21  0609 06/27/21  1217 06/27/21  1102   POTASSIUM mmol/L 2 9*  --  4 1   CHLORIDE mmol/L 111*  --  105   CO2 mmol/L 21  --  19*   BUN mg/dL 8  --  6   CREATININE mg/dL 0 84  --  0 98   CALCIUM mg/dL 9 1  --  9 5   ALK PHOS U/L 113 120*  --    ALT U/L 35 36  --    AST U/L 30 25  --               Results from last 7 days   Lab Units 06/27/21  1102   INR  0 94   PTT seconds 30         Results from last 7 days   Lab Units 06/27/21  1102   TROPONIN I ng/mL <0 02       IMAGING & DIAGNOSTIC TESTING     Radiology Results: I have personally reviewed pertinent reports  CT stroke alert brain    Result Date: 6/27/2021  Impression: No acute intracranial abnormality  Findings were directly discussed with Dr Jeff Reilly on 6/27/2021 11:27 AM  Workstation performed: [id number]     CTA stroke alert (head/neck)    Result Date: 6/27/2021  Impression: No significant carotid or vertebral artery stenosis  No focal intracranial stenosis or aneurysm   Findings were directly discussed with Dr Jeff Reilly on 6/27/2021 11:27 AM  Workstation performed: [id number]     Other Diagnostic Testing: I have personally reviewed pertinent reports  ACTIVE MEDICATIONS     Current Facility-Administered Medications   Medication Dose Route Frequency    acetaminophen (TYLENOL) tablet 650 mg  650 mg Oral Q6H PRN    amLODIPine (NORVASC) tablet 5 mg  5 mg Oral Daily    ascorbic acid (VITAMIN C) tablet 500 mg  500 mg Oral BID    cholecalciferol (VITAMIN D3) tablet 1,000 Units  1,000 Units Oral Daily    enoxaparin (LOVENOX) subcutaneous injection 40 mg  40 mg Subcutaneous D22B    folic acid (FOLVITE) tablet 1,000 mcg  1,000 mcg Oral Daily    LORazepam (ATIVAN) injection 1 mg  1 mg Intravenous Q1H PRN    ondansetron (ZOFRAN) injection 4 mg  4 mg Intravenous Q6H PRN    pantoprazole (PROTONIX) EC tablet 40 mg  40 mg Oral Early Morning    prazosin (MINIPRESS) capsule 2 mg  2 mg Oral Daily    sodium chloride 0 9 % infusion  125 mL/hr Intravenous Continuous       VTE Pharmacologic Prophylaxis: Enoxaparin (Lovenox)  VTE Mechanical Prophylaxis: sequential compression device    Portions of the record may have been created with voice recognition software  Occasional wrong word or "sound a like" substitutions may have occurred due to the inherent limitations of voice recognition software    Read the chart carefully and recognize, using context, where substitutions have occurred   ==  [address]  [place]4

## 2021-06-28 NOTE — CASE MANAGEMENT
LOS 1 Day  Not a Bundle  Not a Readmission  Unplanned Readmission Risk is 48 RED-will need TCM    Pt is confused  CM spoke with son-Mick ([phone]) regarding role of CM and DC planning  Pt lives with son in [address]st floor [address]  Pt is mod assist in the home and connected with OP CM whom is assisting with Waiver services  Pt has a WC, RW, and shower chair  [facility] of VNA and STR at [facility]  Hx of OPPT  PCP is Dr Emelyn Hdz  Pt uses [facility] on [address]  Pt diagnosed with anxiety, depression, and panic disorder  Hx of OP counseling and IP Psych in 2013  OP CM attempting to [facility] [address] and chew and receive an ICM  Denies hx of D/A  Primary Contact: Venkat Rodrigues (son) [phone]  No POA/LW  Transport TBD pending dispo, likely BLS    CM discussed STR as recommended by PT  Son agrees that pt will either need IP rehab for [place] [phone] or PT when stable  Psych consult is pending, pt usually goes to -B, but will await Psych recommendations  CM reviewed d/c planning process including the following: identifying help at home, patient preference for d/c planning needs, Discharge Lounge, [facility] Meds to Bed program, availability of treatment team to discuss questions or concerns patient and/or family may have regarding understanding medications and recognizing signs and symptoms once discharged  CM also encouraged patient to follow up with all recommended appointments after discharge  Patient advised of importance for patient and family to participate in managing patients medical well being

## 2021-06-28 NOTE — PROGRESS NOTES
Patient had two large, mucous, liquid stools  Stool labs ordered including c-diff and patient put on contact isolation  Also informed Peter Izquierdo that patient had  Brown emesis times one  Medication to be ordered  Patient had a one time run of tachycardia with the heart rate up to 160 on [place]  Bp also elevated  OT IV labetalol ordered and given

## 2021-06-28 NOTE — OCCUPATIONAL THERAPY NOTE
Occupational Therapy Evaluation     Patient Name: Racquel Batres  SXRLQ'E Date: 2021  Problem List  Principal Problem:    Encephalopathy  Active Problems:    Chronic pain syndrome    Anxiety    Bipolar II disorder (HCC)    Esophageal reflux    Hypertension    Hypokalemia    Opioid dependence (HCC)    Tardive dyskinesia    Altered mental status    Past Medical History  Past Medical History:   Diagnosis Date    Acid reflux     Anxiety     RESOLVED: [id number]    Arthritis     Bipolar 2 disorder (HCC)     FOLLOWS WITH PSYCHIATRIST  CONTINUE LAMOTRIGINE; RESOLVED: [id number]    Depression     Familial tremor     both hands    Fibromyalgia     LAST ASSESSED: [id number]    Hearing aid worn     left ear    [place] (hard of hearing)     left ear    Hypertension     Left-sided weakness     Lower back pain     Memory loss of unknown cause     long and short term    Migraine     Obesity     Obesity, Class II, BMI 35-39 9     Overactive bladder     Panic attack     Post traumatic stress disorder     Seasonal allergies     Stroke [facility])     questionable stroke 2009    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    CONT ASA 81 MG DAILY; RESOLVED: [id number]    Urinary incontinence     Wears dentures     partial lower / full upper    Wears glasses      Past Surgical History  Past Surgical History:   Procedure Laterality Date    BACK SURGERY       SECTION      COLONOSCOPY      RESOLVED: [id number]    EAR SURGERY      EGD      HYSTERECTOMY  2004    MYRINGOTOMY W/ TUBES Left     NECK SURGERY  2019    VT CYSTOURETHROSCOPY N/A 2016    Procedure: CYSTOSCOPY, BOTOX INJECTION;  Surgeon: Andriy Castro MD;  Location: [facility];  Service: Gynecology    VT IMPLANT SPINAL NEUROSTIM/ Right 2/10/2021    Procedure: REPLACEMENT IMPLANTABLE PULSE GENERATOR DORSAL SPINAL COLUMN STIMULATOR, RIGHT;  Surgeon: Blaise Colunga MD;  Location: [facility];  Service: Neurosurgery    SC PERCUT IMPLNT NEUROELECT,EPIDURAL Right 7/28/2020    Procedure: INSERTION THORACIC DORSAL COLUMN SPINAL CORD STIMULATOR PERCUTANEOUS W IMPLANTABLE PULSE GENERATOR, RIGHT;  Surgeon: Christian Griffiths MD;  Location:  MAIN OR;  Service: Neurosurgery    [facility]    UPPER GASTROINTESTINAL ENDOSCOPY  09/2020 06/28/21 1125   OT Last Visit   OT Visit Date 06/28/21   Note Type   Note type Evaluation   Restrictions/Precautions   Weight Bearing Precautions Per Order Yes   RUE Weight Bearing Per Order WBAT   LUE Weight Bearing Per Order WBAT   RLE Weight Bearing Per Order WBAT  (per ortho notes from outpt visit on 6/14/21)   LLE Weight Bearing Per Order WBAT   Other Precautions Impulsive;Cognitive; Bed Alarm; Restraints; Fall Risk;Multiple lines  (posey waist restraint)   Pain Assessment   Pain Assessment Tool FLACC   Pain Rating: FLACC (Rest) - Face 0   Pain Rating: FLACC (Rest) - Legs 1   Pain Rating: FLACC (Rest) - Activity 1   Pain Rating: FLACC (Rest) - Cry 0   Pain Rating: FLACC (Rest) - Consolability 1   Score: FLACC (Rest) 3   Pain Rating: FLACC (Activity) - Face 1   Pain Rating: FLACC (Activity) - Legs 1   Pain Rating: FLACC (Activity) - Activity 1   Pain Rating: FLACC (Activity) - Cry 1   Pain Rating: FLACC (Activity) - Consolability 1   Score: FLACC (Activity) 5   Home Living   Type of Home Apartment   Home Layout One level   Prior Function   Level of [place] Independent with ADLs and functional mobility; Needs assistance with IADLs   Lives With Son  (works )   Receives Help From Family   ADL Assistance Independent   IADLs Needs assistance   Vocational On disability   Lifestyle   Autonomy I adls and mobility with rollator - son manages [de-identified] of iadls   Reciprocal Relationships supportive family    Service to Others disabled   Intrinsic Gratification unable to id    Subjective   Subjective mostly incomprehensible speech   ADL   Eating Assistance Unable to assess   Grooming Assistance 2  Maximal Assistance   UB Bathing Assistance 2  Maximal Assistance   LB Bathing Assistance 2  Maximal Assistance   UB Dressing Assistance 2  Maximal Assistance   LB Dressing Assistance 2  Maximal Assistance   Toileting Assistance  2  Maximal Assistance   Bed Mobility   Supine to Sit 3  Moderate assistance   Sit to Supine 3  Moderate assistance   Additional items Assist x 2   Transfers   Sit to Stand 3  Moderate assistance   Additional items Assist x 2   Stand to Sit 3  Moderate assistance   Additional items Assist x 2   Balance   Static Sitting Fair   Dynamic Sitting Fair -   Static Standing Poor   Dynamic Standing Poor   Activity Tolerance   Activity Tolerance Patient limited by fatigue;Treatment limited secondary to medical complications (Comment)   Medical Staff Made Aware PT - co-eval 2* complex medical status, limited participation, need for extensive assist and limited ability to engage in activity    RUE Assessment   RUE Assessment X  (AROM grossly wfl - strength 3/5 -unable to follow MMT comman)   LUE Assessment   LUE Assessment X   Cognition   Overall Cognitive Status Impaired   Arousal/Participation Arousable;Lethargic;Sedated;Persistent stimuli required;Poorly responsive   Attention Difficulty attending to directions   Orientation Level Oriented to person   Memory Decreased long term memory;Decreased recall of biographical information;Decreased short term memory;Decreased recall of recent events;Decreased recall of precautions   Following Commands Follows one step commands inconsistently   Comments focuses and attends to name - followed ~25% on simple commands with visual and tactile cues    Assessment   Limitation Decreased ADL status; Decreased UE strength;Decreased Safe judgement during ADL;Decreased cognition;Decreased endurance;Decreased fine motor control;Decreased self-care trans;Decreased high-level ADLs   Prognosis Fair   Assessment Pt is a 60 y o  female who was admitted to [facility] on 6/27/2021 with Encephalopathy -pt found by son with +confusion, lip smacking, dysarthria and ?able L sided weakness  Pt's problem list also includes PMH of HTN, underlying neurological disorder, previous surgery and chronic pain, opioid dependence, anxiety, bipolar disorder, esophageal reflux,TD, prior CVA  At baseline pt was completing adls Violet, ambulating with rollator, son manages [de-identified] of iadls  Pt lives with son in apt  Currently pt requires max assist for overall ADLS and mod to max a x 2  for functional mobility/transfers  Pt currently presents with impairments in the following categories -behavioral pattern, difficulty performing ADLS, difficulty performing IADLS , limited insight into deficits, flat affect, decreased initiation and engagement  and health management  activity tolerance, endurance, standing balance/tolerance, sitting balance/tolerance, UE strength, arousal, memory, insight, safety , judgement , attention , sequencing , task initiation  and task termination   These impairments, as well as pt's fatigue, decreased caregiver support and risk for falls  limit pt's ability to safely engage in all baseline areas of occupation, includingeating, grooming, bathing, dressing, toileting, functional mobility/transfers, community mobility, social participation  and leisure activities  From OT standpoint, recommend inpt rehab upon D/C  OT will continue to follow to address the below stated goals  Goals   Patient Goals none stated 2* cognitive limitations    LTG Time Frame 10-14   Long Term Goal #1 refer to established goals below    Plan   Treatment Interventions ADL retraining;Functional transfer training;UE strengthening/ROM; Endurance training;Cognitive reorientation;Patient/family training;Equipment evaluation/education; Compensatory technique education; Activityengagement   Goal Expiration Date 07/12/21   OT Frequency 2-3x/wk   Recommendation   OT Discharge Recommendation Post acute rehabilitation services   -[facility] Daily Activity Inpatient   Lower Body Dressing 2   Bathing 2   Toileting 2   Upper Body Dressing 2   Grooming 2   Eating 2   Daily Activity Raw Score 12   Daily Activity Standardized Score (Calc for Raw Score >=11) 30 6   AM-[facility] Applied Cognition Inpatient   Following a Speech/Presentation 1   Understanding Ordinary Conversation 2   Taking Medications 1   Remembering Where Things Are Placed or Put Away 1   Remembering List of 4-5 Errands 1   Taking Care of Complicated Tasks 1   Applied Cognition Raw Score 7   Applied Cognition Standardized Score 15 17       OCCUPATIONAL THERAPY GOALS:    *Increase arousal to 25-30 min/session  *Follow simple 1 step commands with 80% consistency  *Pt to be oriented to self, general place and year with min cues  *Min a feeding/grooming after setup with cues to initiate, sequence and complete tasks PRN  *Mod a adls after setup with cues to initiate, sequence and complete tasks PRN  *Mod a toileting and clothing management  *Mod a bed mobility with fair sitting balance/tolerance on EOB to engage in light grooming/self care tasks and enjoyable activities  *Mod a transfers to/from all surfaces with fair balance/safety  *Demonstrate fair dynamic balance for carryover with safe engagement in adl and iadl tasks   *Demonstrate fair carryover of safe use of RW during functional tasks   *Increase activity tolerance to 20-25 min for participation in adls and enjoyable activities  *Assess DME needs  *Increase BUE strength to 4/5 for functional use with adls/mobility   *Pt to participate in ongoing functional cognitive and  assessment with fair attention/concentration to assist with safe d/c recommendations    The patient's raw score on the AM-PAC Daily Activity inpatient short form is 12, standardized score is 30 6, less than 39 4  Patients at this level are likely to benefit from discharge to post-acute rehabilitation services   Please refer to the recommendation of the Occupational Therapist for safe discharge planning        Mis Cool

## 2021-06-28 NOTE — UTILIZATION REVIEW
Initial Clinical Review    Admission: Date/Time/Statement:   Admission Orders (From admission, onward)     Ordered        06/27/21 1414  Inpatient Admission  Once                   Orders Placed This Encounter   Procedures    Inpatient Admission     Standing Status:   Standing     Number of Occurrences:   1     Order Specific Question:   Level of Care     Answer:   Med Surg [16]     Order Specific Question:   Estimated length of stay     Answer:   More than 2 Midnights     Order Specific Question:   Certification     Answer:   I certify that inpatient services are medically necessary for this patient for a duration of greater than two midnights  See H&P and MD Progress Notes for additional information about the patient's course of treatment  ED Arrival Information     Expected Arrival Acuity    - 6/27/2021 10:56 Immediate         Means of arrival Escorted by Service Admission type    Ambulance [address] Emergency         Arrival complaint            Chief Complaint   Patient presents with    Dystonic Reaction     [facility] with c/o tongue twitching and complaint that she cannot throw up  aphasic, confused, difficulty following commands on arrival  L side weakness        Initial Presentation:    61 yo female ,  To ER  Via EMS,   Admitted IP status,  MS level of care for  Workup of  acute encephalopathy (suspected polypharmacy)  Son  Reports pt woke up incoherent this am and could not be redirected  Will plan to hold all psychoactive and pain medications at this time,  PRN IV ativan ;  CT of the head and CT angiogram of the head and neck negative for any intracranial abnormality  Wbc normal and afebrile  nerve stimulator placed in February 2020  for chronic lo back pain  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  PER Med toxicology: lo probability of anticholinergic delirium;  R/o CVA   H/o tardive dyskinesia and anxiety     EXAM:   incoherent,  having trouble forming sentences  No overt focal deficit; however, the patient does not follow commands  Random lip smacking and tongue movements  Will HOLD majority of home meds  :  Order MRI of brain,  Trend neuro cks,  Consult toxicology and neurology      Date:  6/28        Day 2:    Bilirubin continues to rise;  amm <10; One time run of  tachycardia  120's - 160's thru noc  Given iv ativan and labetalol;  K+ 2 9 this am - IV repletion  2 large mucous stools - sent to [facility] r/o  Brown emesis x1      Brown emesis x1  One time run of tachycardia w/hr 160's - again given IV labetalol     6/28  NEUROLOGY :  Obtain EEG , cont holding psych/pain meds;  IV Valium prn    fup mri     ED Triage Vitals   Temperature Pulse Respirations Blood Pressure SpO2   06/27/21 1110 06/27/21 1100 06/27/21 1100 06/27/21 1100 06/27/21 1100   98 °F (36 7 °C) 104 (!) 30 (!) 156/103 99 %      Temp Source Heart Rate Source Patient Position - Orthostatic VS BP Location FiO2 (%)   06/27/21 1110 06/27/21 1100 06/28/21 0737 06/28/21 0737 --   Oral Monitor Lying Left arm       Pain Score       --                 Wt Readings from Last 1 Encounters:   06/28/21 91 7 kg (202 lb 1 6 oz)     Additional Vital Signs:     06/28/21 07:37:44 97 8 °F (36 6 °C) 74 18 153/83 100 %   06/28/21 0730 -- -- -- -- --   06/28/21 06:02:36 -- 63 -- 160/82 96 %   06/28/21 05:37:47 -- 61 -- -- 98 %   06/28/21 04:17:20 97 2 °F (36 2 °C)Abnormal  132 -- 151/108Abnormal  97 %   06/27/21 22:39:27 98 6 °F (37 °C) 76 18 162/110Abnormal  99 %   06/27/21 20:53:52 97 3 °F (36 3 °C)Abnormal  60 -- 125/81 100 %   06/27/21 1915 -- 76 22 119/78 99 %   06/27/21 1645 -- 68 20 145/96 96 %   06/27/21 1400 -- 100 30Abnormal  164/91 99 %   06/27/21 1300 -- 124 30Abnormal  117/99 99 %   06/27/21 1230 -- 106 36Abnormal  119/102Abnormal  100 %   06/27/21 1200 -- 106 34Abnormal  119/102Abnormal  100 %   06/27/21 1145 -- 75 32Abnormal  160/77 100 %   06/27/21 1130 -- 76 30Abnormal 158/107Abnormal  100 %   06/27/21 1115 -- 65 20 178/122Abnormal  100 % ra       Pertinent Labs/Diagnostic Test Results:   6/27  EKG - nsr;  ST shortened   6/27  CT brain :  No acute intracranial abnormality       6/27  CTA head/neck:    No significant carotid or vertebral artery stenosis   No focal intracranial stenosis or aneurysm         Results from last 7 days   Lab Units 06/27/21  1102   SARS-COV-2  Negative     Results from last 7 days   Lab Units 06/28/21  0609 06/27/21  1102   WBC Thousand/uL 11 10* 6 36   HEMOGLOBIN g/dL 15 5* 16 7*   HEMATOCRIT % 46 0 48 4*   PLATELETS Thousands/uL 290 298   NEUTROS ABS Thousands/µL 9 28*  --          Results from last 7 days   Lab Units 06/28/21  0609 06/27/21  1102   SODIUM mmol/L 142 134*   POTASSIUM mmol/L 2 9* 4 1   CHLORIDE mmol/L 111* 105   CO2 mmol/L 21 19*   ANION GAP mmol/L 10 10   BUN mg/dL 8 6   CREATININE mg/dL 0 84 0 98   EGFR ml/min/1 73sq m 89 74   CALCIUM mg/dL 9 1 9 5     Results from last 7 days   Lab Units 06/28/21  0609 06/27/21  1914 06/27/21  1217   AST U/L 30  --  25   ALT U/L 35  --  36   ALK PHOS U/L 113  --  120*   TOTAL PROTEIN g/dL 7 7  --  8 1   ALBUMIN g/dL 3 8  --  4 1   TOTAL BILIRUBIN mg/dL 2 44*  --  1 68*   BILIRUBIN DIRECT mg/dL 0 52*  --  0 43*   AMMONIA umol/L  --  <10*  --          Results from last 7 days   Lab Units 06/28/21  0609 06/27/21  1102   GLUCOSE RANDOM mg/dL 127 129     Results from last 7 days   Lab Units 06/27/21  1217   PH JAM  7 615*   PCO2 JAM mm Hg 20 2*   PO2 JAM mm Hg 29 5*   HCO3 JAM mmol/L 20 1*   BASE EXC JAM mmol/L 1 6   O2 CONTENT JAM ml/dL 16 8   O2 HGB, VENOUS % 71 8             Results from last 7 days   Lab Units 06/27/21  1102   TROPONIN I ng/mL <0 02         Results from last 7 days   Lab Units 06/27/21  1102   PROTIME seconds 12 6   INR  0 94   PTT seconds 30     Results from last 7 days   Lab Units 06/27/21  1218   CLARITY UA  Clear   COLOR UA  Yellow   SPEC GRAV UA  1 015   PH UA  8 5*   GLUCOSE UA mg/dl Negative   KETONES UA mg/dl Negative   BLOOD UA  Trace*   PROTEIN UA mg/dl Negative   NITRITE UA  Negative   BILIRUBIN UA  Negative   UROBILINOGEN UA E U /dl 0 2   LEUKOCYTES UA  Small*   WBC UA /hpf 4-10*   RBC UA /hpf None Seen   BACTERIA UA /hpf None Seen   EPITHELIAL CELLS WET PREP /hpf Occasional             Results from last 7 days   Lab Units 06/27/21  1218   AMPH/METH  Negative   BARBITURATE UR  Negative   BENZODIAZEPINE UR  Negative   COCAINE UR  Negative   METHADONE URINE  Negative   OPIATE UR  Positive*   PCP UR  Negative   THC UR  Negative     Results from last 7 days   Lab Units 06/27/21  1217   ETHANOL LVL mg/dL <3   ACETAMINOPHEN LVL ug/mL <2*   SALICYLATE LVL mg/dL <3*     ED Treatment:   Medication Administration from 06/27/2021 1056 to 06/27/2021 1941       Date/Time Order Dose Route Action     06/27/2021 1215 sodium chloride 0 9 % bolus 1,000 mL 1,000 mL Intravenous New Bag     06/27/2021 1310 physostigmine salicylate (ANTILIRIUM) injection 1 mg 1 mg Intravenous Given     06/27/2021 1320 LORazepam (ATIVAN) injection 1 mg 1 mg Intravenous Given     06/27/2021 1320 ondansetron (ZOFRAN) injection 4 mg 4 mg Intravenous Given     06/27/2021 1400 LORazepam (ATIVAN) injection 1 mg 1 mg Intravenous Given     06/27/2021 1919 enoxaparin (LOVENOX) subcutaneous injection 40 mg 40 mg Subcutaneous Given     06/27/2021 1920 sodium chloride 0 9 % infusion 125 mL/hr Intravenous New Bag     06/27/2021 1919 LORazepam (ATIVAN) injection 1 mg 1 mg Intravenous Given        Past Medical History:   Diagnosis Date    Acid reflux     Anxiety     RESOLVED: [id number]    Arthritis     Bipolar 2 disorder (HCC)     FOLLOWS WITH PSYCHIATRIST   CONTINUE LAMOTRIGINE; RESOLVED: [id number]    Depression     Familial tremor     both hands    Fibromyalgia     LAST ASSESSED: [id number]    Hearing aid worn     left ear    [place] (hard of hearing)     left ear    Hypertension     Left-sided weakness     Lower back pain     Memory loss of unknown cause     long and short term    Migraine     Obesity     Obesity, Class II, BMI 35-39 9     Overactive bladder     Panic attack     Post traumatic stress disorder     Seasonal allergies     Stroke [facility])     questionable stroke 2009    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  CONT ASA 81 MG DAILY; RESOLVED: [id number]    Urinary incontinence     Wears dentures     partial lower / full upper    Wears glasses      Present on Admission:   Bipolar II disorder (HCC)   Anxiety   Chronic pain syndrome   Esophageal reflux   Hypertension   Opioid dependence ([address] )   Tardive dyskinesia      Admitting Diagnosis: Altered mental status [R41 82]  Dystonic movements [G24 9]  Age/Sex: 60 y o  female  Admission Orders:  TELEMETRY;     PT/OT/Speech;  EEG;  I/O q shift;  Neuro cks q 4 hr;  Daily wgt;  NPO ;  IP CONSULT TO TOXICOLOGY  IP CONSULT TO CASE MANAGEMENT  IP CONSULT TO PSYCHIATRY    Scheduled Medications:  amLODIPine, 5 mg, Oral, Daily  ascorbic acid, 500 mg, Oral, BID  cholecalciferol, 1,000 Units, Oral, Daily  enoxaparin, 40 mg, Subcutaneous, E07I  folic acid, 1,257 mcg, Oral, Daily  pantoprazole, 40 mg, Oral, Early Morning  prazosin, 2 mg, Oral, Daily      6/27 2014  IV ativan    6/28  0423  IV ativan   1052  IV Ativan      Continuous IV Infusions:  sodium chloride, 125 mL/hr, Intravenous, Continuous      PRN Meds:  acetaminophen, 650 mg, Oral, Q6H PRN  LORazepam, 1 mg, Intravenous, Q1H PRN  ondansetron, 4 mg, Intravenous, Q6H PRN      Network Utilization Review Department  ATTENTION: Please call with any questions or concerns to [phone] and carefully listen to the prompts so that you are directed to the right person  All voicemails are confidential   [facility] all requests for admission clinical reviews, approved or denied determinations and any other requests to dedicated fax number below belonging to the campus where the patient is receiving treatment   List of dedicated fax numbers for the Facilities:  [address] [place] [address] DENIALS (Administrative/Medical Necessity) [phone]   [address]  (Maternity/NICU/Pediatrics) [phone] [address] Hilda Barton [phone] [place] [address] Lorenza [address] Baker [address] [facility] [address] [facility] [phone]

## 2021-06-28 NOTE — ASSESSMENT & PLAN NOTE
Patient noted to have persistent hypokalemia since admission  She has had a few episodes of loose bowel movements since she has been here, etiology of loose bowel movements likely related to opoid withdrawal   Patient continues to be hypokalemia in the setting of reduced PO intake with diarrhea, likely secondary to GI loss    Patient to be discharged on 20 mEq K, follow up outpatient [facility]    K+ 3 5 today, continue to monitor and replete p r n

## 2021-06-28 NOTE — ASSESSMENT & PLAN NOTE
History:  Presenting with echolalia/pralalia, confusion, and possible initial RLE weakness  Last known normal around 10 PM the night before admission  Tardive dyskinesia chronic  Accompanying tachycardia, mucosal dryness, urinary retention, raising concerns for anticholinergic toxicity  Etiology toxic metabolic vs polypharmacy  Less likely stroke  Patient on multiple psychiatric and pain outpatient medications  Currently holding medications, including hydroxyzine 50 mg QHS, nortriptyline 10 mg QHS, trazodone 200 mg QHS, oxybutynin 5 mg BID  Oxycodone has been restarted  Toxicology consulted on admission, and physostigmine x1 administered, with partial relief in aphasia but not confusion  There was also increased secretions and diaphoresis afterwards  Workup:  · CT/CTA negative, and lab workup initially revealed mild alkalosis likely secondary to hyperventilation  Tbil 1 68 and DBil 0 43, ,  Na 134  UA showed some leukocytes  Leukocytosis, bilirubin levels have been improving  · Lyme panel negative, TSH normal  · Routine EEG on 6/28 - "study is consistent with a moderate generalized non-specific encephalopathy " No evidence of electrographic seizures  · MRI incompatible spinal cord stimulator  Verified with Inpatient Radiology Department  · Repeat CT Head on 6/29: no acute intracranial abnormalities  · On 7/1: patient developed an elevated lactic acid level (4 3) with abnormal UA    Impression:  Patient is significantly improved today compared to day prior  She is awake and oriented to person, time, and place  Her lactic acid levels lowered to normal after IV fluids administration  Low suspicion for meningitis/encephalitis  Overall clinical picture is more consistent with a metabolic encephalopathy and volume depletion  Also, given abnormal urinalysis results, there may be concern for an underlying infectious etiology      Plan:  · IV fluids and antibiotics as per primary team  · No need for VEEG  · Pain control  · Can continue/restart the following medications:  · Cymbalta 60 mg daily, Lyrica 75 mg daily, Valbenazine 80 mg daily    · Oxycodone has also been restarted  · Restarted home Seroquel

## 2021-06-28 NOTE — SPEECH THERAPY NOTE
Speech Language/Pathology  Speech/Language Pathology  Assessment    Patient Name: Analia Adams  NKCDR'T Date: 2021     Problem List  Principal Problem:    Encephalopathy  Active Problems:    Chronic pain syndrome    Anxiety    Bipolar II disorder ([facility] [address] )    Esophageal reflux    Hypertension    Opioid dependence ([facility] )    Tardive dyskinesia    Past Medical History  Past Medical History:   Diagnosis Date    Acid reflux     Anxiety     RESOLVED: [id number]    Arthritis     Bipolar 2 disorder (HCC)     FOLLOWS WITH PSYCHIATRIST  CONTINUE LAMOTRIGINE; RESOLVED: [id number]    Depression     Familial tremor     both hands    Fibromyalgia     LAST ASSESSED: [id number]    Hearing aid worn     left ear    [place] (hard of hearing)     left ear    Hypertension     Left-sided weakness     Lower back pain     Memory loss of unknown cause     long and short term    Migraine     Obesity     Obesity, Class II, BMI 35-39 9     Overactive bladder     Panic attack     Post traumatic stress disorder     Seasonal allergies     Stroke [facility])     questionable stroke 2009    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    CONT ASA 81 MG DAILY; RESOLVED: [id number]    Urinary incontinence     Wears dentures     partial lower / full upper    Wears glasses      Past Surgical History  Past Surgical History:   Procedure Laterality Date    BACK SURGERY       SECTION      COLONOSCOPY      RESOLVED: [id number]    EAR SURGERY      EGD      HYSTERECTOMY      MYRINGOTOMY W/ TUBES Left     NECK SURGERY  2019    NC CYSTOURETHROSCOPY N/A 2016    Procedure: CYSTOSCOPY, BOTOX INJECTION;  Surgeon: Inga Burroughs MD;  Location: [facility];  Service: Gynecology    NC IMPLANT SPINAL NEUROSTIM/ Right 2/10/2021    Procedure: REPLACEMENT IMPLANTABLE PULSE GENERATOR DORSAL SPINAL COLUMN STIMULATOR, RIGHT;  Surgeon: Luz Lua MD;  [facility];  Service: Neurosurgery    JANIA BHAKTA IMPLNT NEUROELECT,EPIDURAL Right 7/28/2020    Procedure: INSERTION THORACIC DORSAL COLUMN SPINAL CORD STIMULATOR PERCUTANEOUS W IMPLANTABLE PULSE GENERATOR, RIGHT;  Surgeon: Martinez Painting MD;  Location: [facility];  Service: Neurosurgery   [address] [place] [address] GASTROINTESTINAL ENDOSCOPY  09/2020        Bedside Swallow Evaluation:    Summary:  Pt presents w/ mild oropharyngeal dysphagia characterized by reduced ability to masticate solids, reduced a-p transfer with solids, and mildly reduced laryngeal rise upon palpation  Pt unable to masticate solids (swallowed small piece of cookie whole), however, pt is edentulous  Tongue pushes to the R side and frequent dyskinesia noted while eating and at rest  Pt tolerated thin liquids with no overt s/s of aspiration  Pt presented with repetitive speech, and was mostly unintelligible  Pt appears confused and unable to respond to basic questions/commands  Recommendations:  Diet: level 1 puree  Liquid: thins  Meds: crushed with puree  Supervision: assist  Positioning:Upright  Strategies: Pt to take PO/Meds only when fully alert and upright, slow po, check oral cavity  Oral care: frequently  Aspiration precautions  Reflux precautions    Therapy Prognosis: fair   Prognosis considerations: altered mental state  Frequency: Will f/u as able     Goal(s):  Pt will tolerate least restrictive diet w/out s/s aspiration or oral/pharyngeal difficulties  Patient's goal: none stated    Consider consult w/:  GI  Neurology  Nutrition    H&P: Patient is a 64-year-old female with past medical history of bipolar disorder, PTSD, panic attacks, fibromyalgia, chronic back pain, hypertension, urinary incontinence/overactive bladder, previous 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, polypharmacy, and migraines who presents to [address] [place] [address] emergency department with altered mental status    Majority of the HPI was obtained through chart review and the patient's son, Azucena Lewis   The patient was extremely disoriented, incoherent, and uncooperative at the time of interview      Per her son, the patient was in her normal state health last night  At baseline, the patient requires some minor help with activities of daily living  She uses a walker to get around the house and at times requires assistance  But, overall the patient is able to take care self, including dispense and take all of her medications  This morning, her son stated that she was in her normal state of drowsiness when she wakes up  He states that normally after she takes her nortriptyline at night the following morning she is drowsy  However, as the morning continued, the patient became incoherent and repetitive  The son attempted to reorient the patient, but was unsuccessful  The son was concerned with how far off the patient was from her baseline, so she was brought in for evaluation of possible CVA      Upon arrival to the ED, patient was hypertensive, tachycardic, and tachypneic  Lab significant for sodium 134, alkaline phosphatase 120, total bilirubin 1 68, hemoglobin 16 7, negative COVID test, and negative UA  Stroke alert was called, but CT and CT angiogram unrevealing  Patient was seen by Neurology in the emergency department, but after initial imaging, there is low concern for stroke  An MRI was ordered  Toxicology saw the patient for concerns of anti cholinergic toxicity with her intermittent tachycardia, dry mucous membranes, encephalopathy, and urinary retention  2 mg of physostigmine was administered without much improvement in mentation  She had a paradoxical reaction with tachycardia instead of bradycardia; however, she did have increased secretions and diaphoresis  Based on her response, acute anticholinergic delirium as an etiology for agitation mental status was less likely  She was then given 2 separate doses of 1 mg lorazepam that calmed her agitation    The patient was admitted for acute encephalopathy possibly secondary to polypharmacy  And in depth medication reconciliation was done with her son over the phone with the patient's medications in hand  Will plan to hold all psychoactive and pain medications at this time  Initiate as needed hourly 1 mg injections of lorazepam per toxicology  Patient is a confirmed level 1 full code with her son  CTA stroke alert (head/neck) 6/27/2021: No significant carotid or vertebral artery stenosis  No focal intracranial stenosis or aneurysm  CT stroke alert brain 6/27/2021: No acute intracranial abnormality  Reason for consult:  R/o aspiration  Determine safest and least restrictive diet  Failed nursing dysphagia assessment  Change in mental status    Precautions:  Fall   Aspiration    Current diet:  NPO    Premorbid diet[de-identified]  Regular with thins    Previous VBS:  -    O2 requirement:  RA    Voice/Speech:  Repetitive speech, "I want to throw" and "Play", mostly unintelligible    Social:  Lives at home    Follows commands:  Unable to follow basic commands, e g  "Smile"                         Cognitive Status:  Confused and unable to appropriately respond to questions    Oral mech exam:  Dentition: edentulous, reports that she wears dentures but they were not present in the room  Labial strength and ROM: constant tardive dyskinesia & lip smacking & pulling  Lingual strength and ROM: ?-constant moving - tongue deviates mostly  to the R side   Mandibular strength and ROM: adequate  Velum: -  Secretion management: adequate  Volitional cough: -  Volitional swallow: -  Oral care     Items administered:  Puree, soft solid (cookie), nectar thick liquid, thin liquids  Liquids were taken by straw/cup       Oral stage:  Lip closure: fair  Mastication: unable to chew solids, swallowed small piece of cookie whole  Bolus formation: adequate with puree  Bolus control: adequate with puree despite the overt movement  Transfer: timely with puree, reduced with solids  Oral residue: held piece of cookie on mid tongue, cued to spit out but pt swallowed whole    Pharyngeal stage:  Swallow promptness: adequate  Laryngeal rise: ?mildy reduced  Wet voice: no  Throat clear: no  Cough: no  Secondary swallows: no  Audible swallows: no  No overt s/s aspiration    Esophageal stage:  H/o GERD    Aspiration precautions posted    Results d/w:  Pt, nursing

## 2021-06-28 NOTE — PHYSICAL THERAPY NOTE
Physical Therapy Evaluation note     Patient Name: Aashish Gonzalez    IKHHG'I Date: 2021     Problem List  Principal Problem:    Encephalopathy  Active Problems:    Chronic pain syndrome    Anxiety    Bipolar II disorder ([facility])    Esophageal reflux    Hypertension    Opioid dependence ([address] )    Tardive dyskinesia       Past Medical History  Past Medical History:   Diagnosis Date    Acid reflux     Anxiety     RESOLVED: [id number]    Arthritis     Bipolar 2 disorder (HCC)     FOLLOWS WITH PSYCHIATRIST  CONTINUE LAMOTRIGINE; RESOLVED: [id number]    Depression     Familial tremor     both hands    Fibromyalgia     LAST ASSESSED: [id number]    Hearing aid worn     left ear    [place] (hard of hearing)     left ear    Hypertension     Left-sided weakness     Lower back pain     Memory loss of unknown cause     long and short term    Migraine     Obesity     Obesity, Class II, BMI 35-39 9     Overactive bladder     Panic attack     Post traumatic stress disorder     Seasonal allergies     Stroke [facility])     questionable stroke 2009    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    CONT ASA 81 MG DAILY; RESOLVED: [id number]    Urinary incontinence     Wears dentures     partial lower / full upper    Wears glasses         Past Surgical History  Past Surgical History:   Procedure Laterality Date    BACK SURGERY       SECTION      COLONOSCOPY      RESOLVED: [id number]    EAR SURGERY      EGD      HYSTERECTOMY  2004    MYRINGOTOMY W/ TUBES Left     NECK SURGERY  2019    OR CYSTOURETHROSCOPY N/A 2016    Procedure: CYSTOSCOPY, BOTOX INJECTION;  Surgeon: Kari Frye MD;  Location: [facility];  Service: Gynecology    OR IMPLANT SPINAL NEUROSTIM/ Right 2/10/2021    Procedure: REPLACEMENT IMPLANTABLE PULSE GENERATOR DORSAL SPINAL COLUMN STIMULATOR, RIGHT;  Surgeon: Alis Sarah MD;  Location[facility] OR;  Service: Neurosurgery    [place] PERCUT IMPLNT NEUROELECT,EPIDURAL Right 7/28/2020    Procedure: INSERTION THORACIC DORSAL COLUMN SPINAL CORD STIMULATOR PERCUTANEOUS W IMPLANTABLE PULSE GENERATOR, RIGHT;  Surgeon: Marcos Cates MD;  Location:  MAIN OR;  Service: Neurosurgery    TONSILLECTOMY     [address] [place]    UPPER GASTROINTESTINAL ENDOSCOPY  09/2020 06/28/21 1123   PT Last Visit   PT Visit Date 06/28/21   Note Type   Note type Evaluation   Pain Assessment   Pain Assessment Tool FLACC   Pain Rating: FLACC (Rest) - Face 0   Pain Rating: FLACC (Rest) - Legs 0   Pain Rating: FLACC (Rest) - Activity 0   Pain Rating: FLACC (Rest) - Cry 0   Pain Rating: FLACC (Rest) - Consolability 0   Score: FLACC (Rest) 0   Pain Rating: FLACC (Activity) - Face 1   Pain Rating: FLACC (Activity) - Legs 1   Pain Rating: FLACC (Activity) - Activity 0   Pain Rating: FLACC (Activity) - Cry 0   Pain Rating: FLACC (Activity) - Consolability 0   Score: FLACC (Activity) 2   Home Living   Type of Home Apartment   Home Layout One level   Additional Comments Pt lives in an apartment with 2 SKY  Pt lives with her son who works  Pt requires A with IADl's and is I for ADL's and mobility prior  Per past notes, patient unable to state at this time    Prior Function   Level of [place] Independent with ADLs and functional mobility   Lives With Son   Receives Help From Family   ADL Assistance Independent   IADLs Needs assistance   Restrictions/Precautions   Weight Bearing Precautions Per Order Yes   RLE Weight Bearing Per Order WBAT  (per ortho notes from outpatient visist on 6/14/2021)   Braces or Orthoses   (surgical shoe per ortho note-possibly depending on pain)   Other Precautions Cognitive; Chair Alarm; Bed Alarm;Multiple lines; Fall Risk;Pain;Restraints  (posey, difficulty following commands)   General   Family/Caregiver Present No   Cognition   Overall Cognitive Status Impaired   Arousal/Participation Alert   Orientation Level Oriented to person  (pt able to respond to her first name but unable to state her last name or birthday)   RLE Assessment   RLE Assessment   (grossly 3/5 per mobility)   LLE Assessment   LLE Assessment   (grossly 3/5 per mobility )   Coordination   Sensation   (unable to formally test )   Bed Mobility   Supine to Sit 3  Moderate assistance   Additional items Assist x 1   Sit to Supine 3  Moderate assistance   Additional items Assist x 2   Additional Comments sitting EOB at a min A level for impulstivity    Transfers   Sit to Stand 3  Moderate assistance   Additional items Assist x 2   Stand to Sit 3  Moderate assistance   Additional items Assist x 2   Balance   Static Sitting Fair -   Dynamic Sitting Poor +   Static Standing Poor +   Dynamic Standing Poor   Endurance Deficit   Endurance Deficit Yes   Activity Tolerance   Activity Tolerance Patient limited by fatigue;Patient limited by pain   Medical Staff Made Aware OT   Nurse Made Aware nurse approved therapy session   Assessment   Prognosis Guarded   Problem List Decreased strength;Decreased endurance; Impaired balance;Decreased mobility; Decreased cognition;Decreased safety awareness; Impaired judgement;Pain   Assessment Pt is a 59 yo female admitted to [place] [address] on 6/27/2021 s/p AMS  Dx: encephalopathy, tardive dyskinesia, hypertension  Two patient identifiers were used to confirm  Per past PT notes, patient lives in a 1 story apartment with 2 SKY with her son  Pt required A with IADL's prior  Pt was I for ADL's and mobility  Pt owns a rollator  Pt's son works during the day  Pt's impairments include reduced mobility, high risk of falling, poor activity tolerance, restrained, reduced ability to follow commands, impulsive, poor sitting tolerance and balance, confusion, disoriented  These impairments limit the ability of the patient to perform mobility without increased assistance, return to OF and participate in everyday life activities   Pt would benefit from continued skilled therapy while in the hospital to improve overall mobility and work towards a safe d/c  Recommend discharge to rehab  At the end of the session the patient was left in seated position with call bell and phone within reach  The patient's AM-PAC Basic Mobility Inpatient Short Form Low Function Raw Score 13 , Standardized Score is 20  14  A standardized score less 42 9 suggests the patient may benefit from discharge to post-acute rehab services  Please also refer to the recommendation of the Physical Therapist for safe discharge planning  Pt placed back in bed with bed alarm on and posey placed  RN notified  Barriers to Discharge Inaccessible home environment;Decreased caregiver support   Goals   STG Expiration Date 07/12/21   Short Term Goal #1 STG 1: Pt will perform transfers at a min A level to return to baseline of function  STG 2: Pt will perform bed mobility at a min A to safety return to PLOF  STG 3: pt will sit EOB at a I level for 35-50 minutes while performing dynamic and static balance  PT Treatment Day 0   Plan   Treatment/Interventions Functional transfer training;LE strengthening/ROM; Therapeutic exercise; Endurance training;Bed mobility;Gait training   PT Frequency Other (Comment)  (2-3xwk)   Recommendation   PT Discharge Recommendation Post acute rehabilitation services   Equipment Recommended   (TBD)   PT - OK to Discharge Yes   Additional Comments if to rehab   [address]   Turning in Bed Without Bedrails 2   Lying on Back to Sitting on Edge of Flat Bed 2   Moving Bed to Chair 1   Standing Up From Chair 1   Walk in Room 1   Climb 3-5 Stairs 1   Basic Mobility Inpatient Raw Score 8   Turning Head Towards Sound 3   Follow Simple Instructions 2   Low Function Basic Mobility Raw Score 13   Low Function Basic Mobility Standardized Score 20 14   Virginia Lips, PT, DPT

## 2021-06-28 NOTE — PLAN OF CARE
Problem: Potential for Falls  Goal: Patient will remain free of falls  Description: INTERVENTIONS:  - Educate patient/family on patient safety including physical limitations  - Instruct patient to call for assistance with activity   - Consult OT/PT to assist with strengthening/mobility   - Keep Call bell within reach  - Keep bed low and locked with side rails adjusted as appropriate  - Keep care items and personal belongings within reach  - Initiate and maintain comfort rounds  - Make Fall Risk Sign visible to staff  Problem: NEUROSENSORY - ADULT  Goal: Achieves stable or improved neurological status  Description: INTERVENTIONS  - Monitor and report changes in neurological status  - Monitor vital signs such as temperature, blood pressure, glucose, and any other labs ordered   - Initiate measures to prevent increased intracranial pressure  - Monitor for seizure activity and implement precautions if appropriate      Outcome: Progressing     Problem: SAFETY ADULT  Goal: Patient will remain free of falls  Description: INTERVENTIONS:  - Educate patient/family on patient safety including physical limitations  - Instruct patient to call for assistance with activity   - Consult OT/PT to assist with strengthening/mobility   - Keep Call bell within reach  - Keep bed low and locked with side rails adjusted as appropriate  - Keep care items and personal belongings within reach  - Initiate and maintain comfort rounds  - Make Fall Risk Sign visible to staff  Problem: Knowledge Deficit  Goal: Patient/family/caregiver demonstrates understanding of disease process, treatment plan, medications, and discharge instructions  Description: Complete learning assessment and assess knowledge base    Interventions:  - Provide teaching at level of understanding  - Provide teaching via preferred learning methods  Outcome: Progressing     Problem: Prexisting or High Potential for Compromised Skin Integrity  Goal: Skin integrity is maintained or improved  Description: INTERVENTIONS:  - Identify patients at risk for skin breakdown  - Assess and monitor skin integrity  - Assess and monitor nutrition and hydration status  - Monitor labs   - Assess for incontinence   - Turn and reposition patient  - Assist with mobility/ambulation  - Relieve pressure over bony prominences  - Avoid friction and shearing  - Provide appropriate hygiene as needed including keeping skin clean and dry  - Evaluate need for skin moisturizer/barrier cream  - Collaborate with interdisciplinary team   - Patient/family teaching  - Consider wound care consult   Outcome: Progressing     - Apply yellow socks and bracelet for high fall risk patients  - Consider moving patient to room near nurses station  Outcome: Progressing     - Apply yellow socks and bracelet for high fall risk patients  - Consider moving patient to room near nurses station  Outcome: Progressing     Problem: MOBILITY - ADULT  Goal: Maintain or return to baseline ADL function  Description: INTERVENTIONS:  -  Assess patient's ability to carry out ADLs; assess patient's baseline for ADL function and identify physical deficits which impact ability to perform ADLs (bathing, care of mouth/teeth, toileting, grooming, dressing, etc )  - Assess/evaluate cause of self-care deficits   - Assess range of motion  - Assess patient's mobility; develop plan if impaired  - Assess patient's need for assistive devices and provide as appropriate  - Encourage maximum independence but intervene and supervise when necessary  - Involve family in performance of ADLs  - Assess for home care needs following discharge   - Consider OT consult to assist with ADL evaluation and planning for discharge  - Provide patient education as appropriate  Outcome: Progressing

## 2021-06-28 NOTE — QUICK NOTE
Was called by the patient nurse 4:20 a m  That the patient is tachycardic in the 120s, and that she was in up to 160s earlier per nurse  Per Chart review blood pressure has been elevated as well 150s-160s over 100s  Patient was seen and examined, review of system is limited due to patient altered mental status/aphasia  Physical Exam:  General:  Mildly agitated trying to get out of the bed, posey belt in place   HEENT: lip-smacking, moist oral mucosa, PERRL   Cardiovascular:  No JVD appreciated, RRR, no murmurs, on IV fluids 120 cc/hour  Respiratory:  CTAB, breathing rate estimated 18 bpm, well saturated on room air  Abdominal:  Soft non-distended  :  Stanton catheter in place, pinkish fluid collected otherwise clear (likely due to traumatic as patient moving in the bed)   Extremities:  No lower extremity edema    Visit Vitals  BP (!) 151/108   Pulse (!) 132   Temp (!) 97 2 °F (36 2 °C)   Resp 18   SpO2 97%   OB Status Hysterectomy   Smoking Status Never Smoker       Patient has been tachycardic and prior was tachypnic, respiratory alkalosis evident recent venous blood gas 6/27, however normal breathing rate during exam, no leukocytosis, and afebrile  Urinalysis no evidence of UTI  Most likely hypertension and tachycardia secondary to opioid withdrawal given that patient's medications have been held secondary to the encephalopathy on admission  Plan  P r n  Ativan ; 1 mg given at 4:23 a m    Labetalol 10 mg once ; patient is NPO   Considering EKG if heart rate uncontrolled/greater than 130, RN updated and follow  Will discuss with my attending and morning team for further management as needed

## 2021-06-28 NOTE — UTILIZATION REVIEW
Inpatient Admission Authorization Request   NOTIFICATION OF INPATIENT ADMISSION/INPATIENT AUTHORIZATION REQUEST   SERVICING FACILITY:   [facility]  Address: [address] [place] [address], [address] [place] [id number]  Tax ID: [id number]  NPI: [id number]  Place of Service: Inpatient [address] [facility] Code: 24     ATTENDING PROVIDER:  Attending Name and NPI#: Shaneka Rocío, [address] [[id number]]  Address: [address] [place] [address], [address] [place] [address]  Phone: [phone]     UTILIZATION REVIEW CONTACT:  [facility] Utilization   Network Utilization Review Department  Phone: [phone]  Fax: [phone]  Email: [email or web address]     PHYSICIAN ADVISORY SERVICES:  FOR DINB-XD-MHOE REVIEW - MEDICAL NECESSITY DENIAL  Phone: [phone]  Fax: [phone]  Email: [email or web address]     TYPE OF REQUEST:  Inpatient Status     ADMISSION INFORMATION:  ADMISSION DATE/TIME: 6/27/21  2:14 PM  PATIENT DIAGNOSIS CODE/DESCRIPTION:  Altered mental status [R41 82]  Dystonic movements [G24 9]  DISCHARGE DATE/TIME: No discharge date for patient encounter  DISCHARGE DISPOSITION (IF DISCHARGED): Discharge transferred to a designated disaster alternate care     IMPORTANT INFORMATION:  Please contact the [facility] directly with any questions or concerns regarding this request  Department voicemails are confidential     Send requests for admission clinical reviews, concurrent reviews, approvals, and administrative denials due to lack of clinical to fax [phone]

## 2021-06-29 ENCOUNTER — APPOINTMENT (INPATIENT)
Dept: RADIOLOGY | Facility: HOSPITAL | Age: 56
DRG: 092 | End: 2021-06-29
Payer: COMMERCIAL

## 2021-06-29 LAB
ALBUMIN SERPL BCP-MCNC: 3.8 G/DL (ref 3.5–5)
ALP SERPL-CCNC: 110 U/L (ref 46–116)
ALT SERPL W P-5'-P-CCNC: 37 U/L (ref 12–78)
ANION GAP SERPL CALCULATED.3IONS-SCNC: 12 MMOL/L (ref 4–13)
AST SERPL W P-5'-P-CCNC: 31 U/L (ref 5–45)
BILIRUB DIRECT SERPL-MCNC: 0.52 MG/DL (ref 0–0.2)
BILIRUB SERPL-MCNC: 2.07 MG/DL (ref 0.2–1)
BUN SERPL-MCNC: 5 MG/DL (ref 5–25)
CALCIUM SERPL-MCNC: 8.6 MG/DL (ref 8.3–10.1)
CHLORIDE SERPL-SCNC: 109 MMOL/L (ref 100–108)
CO2 SERPL-SCNC: 16 MMOL/L (ref 21–32)
CREAT SERPL-MCNC: 0.61 MG/DL (ref 0.6–1.3)
ERYTHROCYTE [DISTWIDTH] IN BLOOD BY AUTOMATED COUNT: 13.8 % (ref 11.6–15.1)
GFR SERPL CREATININE-BSD FRML MDRD: 116 ML/MIN/1.73SQ M
GLUCOSE SERPL-MCNC: 113 MG/DL (ref 65–140)
HCT VFR BLD AUTO: 43.7 % (ref 34.8–46.1)
HGB BLD-MCNC: 15.4 G/DL (ref 11.5–15.4)
LACTATE SERPL-SCNC: 0.7 MMOL/L (ref 0.5–2)
MCH RBC QN AUTO: 29.7 PG (ref 26.8–34.3)
MCHC RBC AUTO-ENTMCNC: 35.2 G/DL (ref 31.4–37.4)
MCV RBC AUTO: 84 FL (ref 82–98)
PLATELET # BLD AUTO: 339 THOUSANDS/UL (ref 149–390)
PMV BLD AUTO: 9.1 FL (ref 8.9–12.7)
POTASSIUM SERPL-SCNC: 2.7 MMOL/L (ref 3.5–5.3)
POTASSIUM SERPL-SCNC: 3.6 MMOL/L (ref 3.5–5.3)
PROT SERPL-MCNC: 7.8 G/DL (ref 6.4–8.2)
RBC # BLD AUTO: 5.18 MILLION/UL (ref 3.81–5.12)
SODIUM SERPL-SCNC: 137 MMOL/L (ref 136–145)
VIT B12 SERPL-MCNC: 1443 PG/ML (ref 100–900)
WBC # BLD AUTO: 12.81 THOUSAND/UL (ref 4.31–10.16)

## 2021-06-29 PROCEDURE — 87389 HIV-1 AG W/HIV-1&-2 AB AG IA: CPT | Performed by: STUDENT IN AN ORGANIZED HEALTH CARE EDUCATION/TRAINING PROGRAM

## 2021-06-29 PROCEDURE — 85027 COMPLETE CBC AUTOMATED: CPT | Performed by: STUDENT IN AN ORGANIZED HEALTH CARE EDUCATION/TRAINING PROGRAM

## 2021-06-29 PROCEDURE — 99231 SBSQ HOSP IP/OBS SF/LOW 25: CPT | Performed by: PSYCHIATRY & NEUROLOGY

## 2021-06-29 PROCEDURE — 84425 ASSAY OF VITAMIN B-1: CPT | Performed by: PSYCHIATRY & NEUROLOGY

## 2021-06-29 PROCEDURE — 80076 HEPATIC FUNCTION PANEL: CPT | Performed by: STUDENT IN AN ORGANIZED HEALTH CARE EDUCATION/TRAINING PROGRAM

## 2021-06-29 PROCEDURE — 92526 ORAL FUNCTION THERAPY: CPT

## 2021-06-29 PROCEDURE — 83605 ASSAY OF LACTIC ACID: CPT | Performed by: STUDENT IN AN ORGANIZED HEALTH CARE EDUCATION/TRAINING PROGRAM

## 2021-06-29 PROCEDURE — 99233 SBSQ HOSP IP/OBS HIGH 50: CPT | Performed by: INTERNAL MEDICINE

## 2021-06-29 PROCEDURE — 87040 BLOOD CULTURE FOR BACTERIA: CPT | Performed by: STUDENT IN AN ORGANIZED HEALTH CARE EDUCATION/TRAINING PROGRAM

## 2021-06-29 PROCEDURE — 70450 CT HEAD/BRAIN W/O DYE: CPT

## 2021-06-29 PROCEDURE — 84132 ASSAY OF SERUM POTASSIUM: CPT | Performed by: STUDENT IN AN ORGANIZED HEALTH CARE EDUCATION/TRAINING PROGRAM

## 2021-06-29 PROCEDURE — 82607 VITAMIN B-12: CPT | Performed by: STUDENT IN AN ORGANIZED HEALTH CARE EDUCATION/TRAINING PROGRAM

## 2021-06-29 PROCEDURE — NC001 PR NO CHARGE: Performed by: PSYCHIATRY & NEUROLOGY

## 2021-06-29 PROCEDURE — 71045 X-RAY EXAM CHEST 1 VIEW: CPT

## 2021-06-29 PROCEDURE — 80048 BASIC METABOLIC PNL TOTAL CA: CPT | Performed by: STUDENT IN AN ORGANIZED HEALTH CARE EDUCATION/TRAINING PROGRAM

## 2021-06-29 PROCEDURE — G1004 CDSM NDSC: HCPCS

## 2021-06-29 PROCEDURE — 86592 SYPHILIS TEST NON-TREP QUAL: CPT | Performed by: STUDENT IN AN ORGANIZED HEALTH CARE EDUCATION/TRAINING PROGRAM

## 2021-06-29 RX ORDER — SODIUM CHLORIDE, SODIUM GLUCONATE, SODIUM ACETATE, POTASSIUM CHLORIDE, MAGNESIUM CHLORIDE, SODIUM PHOSPHATE, DIBASIC, AND POTASSIUM PHOSPHATE .53; .5; .37; .037; .03; .012; .00082 G/100ML; G/100ML; G/100ML; G/100ML; G/100ML; G/100ML; G/100ML
125 INJECTION, SOLUTION INTRAVENOUS CONTINUOUS
Status: DISCONTINUED | OUTPATIENT
Start: 2021-06-29 | End: 2021-06-29

## 2021-06-29 RX ORDER — POTASSIUM CHLORIDE 20 MEQ/1
40 TABLET, EXTENDED RELEASE ORAL ONCE
Status: COMPLETED | OUTPATIENT
Start: 2021-06-29 | End: 2021-06-29

## 2021-06-29 RX ORDER — LORATADINE 10 MG
TABLET ORAL
Qty: 60 TABLET | Refills: 0 | OUTPATIENT
Start: 2021-06-29

## 2021-06-29 RX ORDER — ASPIRIN 81 MG/1
81 TABLET, CHEWABLE ORAL DAILY
Status: DISCONTINUED | OUTPATIENT
Start: 2021-06-29 | End: 2021-07-07 | Stop reason: HOSPADM

## 2021-06-29 RX ORDER — BUSPIRONE HYDROCHLORIDE 15 MG/1
TABLET ORAL
Qty: 90 TABLET | Refills: 0 | OUTPATIENT
Start: 2021-06-29

## 2021-06-29 RX ORDER — DULOXETIN HYDROCHLORIDE 60 MG/1
60 CAPSULE, DELAYED RELEASE ORAL DAILY
Status: DISCONTINUED | OUTPATIENT
Start: 2021-06-29 | End: 2021-07-07 | Stop reason: HOSPADM

## 2021-06-29 RX ORDER — LOPERAMIDE HCL 1 MG/7.5ML
2 SUSPENSION ORAL 3 TIMES DAILY PRN
Status: DISCONTINUED | OUTPATIENT
Start: 2021-06-29 | End: 2021-07-01

## 2021-06-29 RX ORDER — FOLIC ACID 1 MG/1
TABLET ORAL
Qty: 30 TABLET | Refills: 1 | OUTPATIENT
Start: 2021-06-29

## 2021-06-29 RX ORDER — SODIUM CHLORIDE, SODIUM LACTATE, POTASSIUM CHLORIDE, CALCIUM CHLORIDE 600; 310; 30; 20 MG/100ML; MG/100ML; MG/100ML; MG/100ML
100 INJECTION, SOLUTION INTRAVENOUS CONTINUOUS
Status: DISPENSED | OUTPATIENT
Start: 2021-06-29 | End: 2021-07-03

## 2021-06-29 RX ORDER — POTASSIUM CHLORIDE 14.9 MG/ML
20 INJECTION INTRAVENOUS ONCE
Status: COMPLETED | OUTPATIENT
Start: 2021-06-29 | End: 2021-06-29

## 2021-06-29 RX ORDER — POTASSIUM CHLORIDE 14.9 MG/ML
20 INJECTION INTRAVENOUS ONCE
Status: COMPLETED | OUTPATIENT
Start: 2021-06-29 | End: 2021-06-30

## 2021-06-29 RX ORDER — LABETALOL 20 MG/4 ML (5 MG/ML) INTRAVENOUS SYRINGE
10 EVERY 6 HOURS PRN
Status: DISCONTINUED | OUTPATIENT
Start: 2021-06-29 | End: 2021-07-07 | Stop reason: HOSPADM

## 2021-06-29 RX ORDER — LOPERAMIDE HCL 1 MG/7.5ML
4 SUSPENSION ORAL ONCE
Status: CANCELLED | OUTPATIENT
Start: 2021-06-29 | End: 2021-06-29

## 2021-06-29 RX ORDER — ATORVASTATIN CALCIUM 40 MG/1
40 TABLET, FILM COATED ORAL
Status: DISCONTINUED | OUTPATIENT
Start: 2021-06-29 | End: 2021-07-07 | Stop reason: HOSPADM

## 2021-06-29 RX ORDER — DULOXETIN HYDROCHLORIDE 60 MG/1
CAPSULE, DELAYED RELEASE ORAL
Qty: 30 CAPSULE | Refills: 0 | OUTPATIENT
Start: 2021-06-29

## 2021-06-29 RX ORDER — LORAZEPAM 2 MG/ML
1 INJECTION INTRAMUSCULAR
Status: DISCONTINUED | OUTPATIENT
Start: 2021-06-29 | End: 2021-07-03

## 2021-06-29 RX ORDER — LORAZEPAM 2 MG/ML
1 INJECTION INTRAMUSCULAR
Status: DISCONTINUED | OUTPATIENT
Start: 2021-06-29 | End: 2021-06-29

## 2021-06-29 RX ADMIN — MORPHINE SULFATE 2 MG: 2 INJECTION, SOLUTION INTRAMUSCULAR; INTRAVENOUS at 19:30

## 2021-06-29 RX ADMIN — ATORVASTATIN CALCIUM 40 MG: 40 TABLET, FILM COATED ORAL at 17:00

## 2021-06-29 RX ADMIN — OXYCODONE HYDROCHLORIDE AND ACETAMINOPHEN 500 MG: 500 TABLET ORAL at 17:00

## 2021-06-29 RX ADMIN — OXYCODONE HYDROCHLORIDE AND ACETAMINOPHEN 500 MG: 500 TABLET ORAL at 08:13

## 2021-06-29 RX ADMIN — POTASSIUM CHLORIDE 20 MEQ: 14.9 INJECTION, SOLUTION INTRAVENOUS at 10:35

## 2021-06-29 RX ADMIN — Medication 1000 UNITS: at 08:13

## 2021-06-29 RX ADMIN — ASPIRIN 81 MG CHEWABLE TABLET 81 MG: 81 TABLET CHEWABLE at 17:00

## 2021-06-29 RX ADMIN — FOLIC ACID 1000 MCG: 1 TABLET ORAL at 08:13

## 2021-06-29 RX ADMIN — LORAZEPAM 1 MG: 2 INJECTION INTRAMUSCULAR; INTRAVENOUS at 22:11

## 2021-06-29 RX ADMIN — AMLODIPINE BESYLATE 5 MG: 5 TABLET ORAL at 08:13

## 2021-06-29 RX ADMIN — MORPHINE SULFATE 2 MG: 2 INJECTION, SOLUTION INTRAMUSCULAR; INTRAVENOUS at 15:20

## 2021-06-29 RX ADMIN — POTASSIUM CHLORIDE 20 MEQ: 14.9 INJECTION, SOLUTION INTRAVENOUS at 14:26

## 2021-06-29 RX ADMIN — ENOXAPARIN SODIUM 40 MG: 40 INJECTION SUBCUTANEOUS at 19:31

## 2021-06-29 RX ADMIN — SODIUM CHLORIDE 125 ML/HR: 0.9 INJECTION, SOLUTION INTRAVENOUS at 06:00

## 2021-06-29 RX ADMIN — POTASSIUM CHLORIDE 40 MEQ: 1500 TABLET, EXTENDED RELEASE ORAL at 10:36

## 2021-06-29 RX ADMIN — POTASSIUM CHLORIDE 40 MEQ: 1500 TABLET, EXTENDED RELEASE ORAL at 14:20

## 2021-06-29 RX ADMIN — SODIUM CHLORIDE, SODIUM LACTATE, POTASSIUM CHLORIDE, AND CALCIUM CHLORIDE 125 ML/HR: .6; .31; .03; .02 INJECTION, SOLUTION INTRAVENOUS at 14:23

## 2021-06-29 RX ADMIN — SODIUM CHLORIDE, SODIUM LACTATE, POTASSIUM CHLORIDE, AND CALCIUM CHLORIDE 125 ML/HR: .6; .31; .03; .02 INJECTION, SOLUTION INTRAVENOUS at 22:12

## 2021-06-29 NOTE — PLAN OF CARE
Problem: Potential for Falls  Goal: Patient will remain free of falls  Description: INTERVENTIONS:  - Educate patient/family on patient safety including physical limitations  - Instruct patient to call for assistance with activity   - Consult OT/PT to assist with strengthening/mobility   - Keep Call bell within reach  - Keep bed low and locked with side rails adjusted as appropriate  - Keep care items and personal belongings within reach  - Initiate and maintain comfort rounds  - Make Fall Risk Sign visible to staff  - Offer Toileting every 2 Hours, in advance of need  - Initiate/Maintain 2 alarm  - Obtain necessary fall risk management equipment Apply yellow socks and bracelet for high fall risk patients  - Consider moving patient to room near nurses station  Outcome: Not Progressing     Problem: MOBILITY - ADULT  Goal: Maintain or return to baseline ADL function  Description: INTERVENTIONS:  -  Assess patient's ability to carry out ADLs; assess patient's baseline for ADL function and identify physical deficits which impact ability to perform ADLs (bathing, care of mouth/teeth, toileting, grooming, dressing, etc )  - Assess/evaluate cause of self-care deficits   - Assess range of motion  - Assess patient's mobility; develop plan if impaired  - Assess patient's need for assistive devices and provide as appropriate  - Encourage maximum independence but intervene and supervise when necessary  - Involve family in performance of ADLs  - Assess for home care needs following discharge   - Consider OT consult to assist with ADL evaluation and planning for discharge  - Provide patient education as appropriate  Outcome: Not Progressing  Goal: Maintains/Returns to pre admission functional level  Description: INTERVENTIONS:  - Perform BMAT or MOVE assessment daily    - Set and communicate daily mobility goal to care team and patient/family/caregiver     - Collaborate with rehabilitation services on mobility goals if consulted  - Perform Range of Motion 3 times a day  - Reposition patient every 2 hours    - Dangle patient 3 times a day  - Stand patient 3 times a day  - Ambulate patient 3 times a day  - Out of bed to chair 3 times a day   - Out of bed for meals 3 times a day  - Out of bed for toileting  - Record patient progress and toleration of activity level   Outcome: Not Progressing     Problem: NEUROSENSORY - ADULT  Goal: Achieves stable or improved neurological status  Description: INTERVENTIONS  - Monitor and report changes in neurological status  - Monitor vital signs such as temperature, blood pressure, glucose, and any other labs ordered   - Initiate measures to prevent increased intracranial pressure  - Monitor for seizure activity and implement precautions if appropriate      Outcome: Not Progressing     Problem: SAFETY ADULT  Goal: Patient will remain free of falls  Description: INTERVENTIONS:  - Educate patient/family on patient safety including physical limitations  - Instruct patient to call for assistance with activity   - Consult OT/PT to assist with strengthening/mobility   - Keep Call bell within reach  - Keep bed low and locked with side rails adjusted as appropriate  - Keep care items and personal belongings within reach  - Initiate and maintain comfort rounds  - Make Fall Risk Sign visible to staff  - Offer Toileting every 2 Hours, in advance of need  - Initiate/Maintain alarm  - Obtain necessary fall risk management equipment:   - Apply yellow socks and bracelet for high fall risk patients  - Consider moving patient to room near nurses station  Outcome: Not Progressing     Problem: DISCHARGE PLANNING  Goal: Discharge to home or other facility with appropriate resources  Description: INTERVENTIONS:  - Identify barriers to discharge w/patient and caregiver  - Arrange for needed discharge resources and transportation as appropriate  - Identify discharge learning needs (meds, wound care, etc )  - Arrange for interpretive services to assist at discharge as needed  - Refer to Case Management Department for coordinating discharge planning if the patient needs post-hospital services based on physician/advanced practitioner order or complex needs related to functional status, cognitive ability, or social support system  Outcome: Not Progressing     Problem: Knowledge Deficit  Goal: Patient/family/caregiver demonstrates understanding of disease process, treatment plan, medications, and discharge instructions  Description: Complete learning assessment and assess knowledge base  Interventions:  - Provide teaching at level of understanding  - Provide teaching via preferred learning methods  Outcome: Not Progressing     Problem: Prexisting or High Potential for Compromised Skin Integrity  Goal: Skin integrity is maintained or improved  Description: INTERVENTIONS:  - Identify patients at risk for skin breakdown  - Assess and monitor skin integrity  - Assess and monitor nutrition and hydration status  - Monitor labs   - Assess for incontinence   - Turn and reposition patient  - Assist with mobility/ambulation  - Relieve pressure over bony prominences  - Avoid friction and shearing  - Provide appropriate hygiene as needed including keeping skin clean and dry  - Evaluate need for skin moisturizer/barrier cream  - Collaborate with interdisciplinary team   - Patient/family teaching  - Consider wound care consult   Outcome: Not Progressing     Problem: Nutrition/Hydration-ADULT  Goal: Nutrient/Hydration intake appropriate for improving, restoring or maintaining nutritional needs  Description: Monitor and assess patient's nutrition/hydration status for malnutrition  Collaborate with interdisciplinary team and initiate plan and interventions as ordered  Monitor patient's weight and dietary intake as ordered or per policy  Utilize nutrition screening tool and intervene as necessary   Determine patient's food preferences and provide high-protein, high-caloric foods as appropriate       INTERVENTIONS:  - Monitor oral intake, urinary output, labs, and treatment plans  - Assess nutrition and hydration status and recommend course of action  - Evaluate amount of meals eaten  - Assist patient with eating if necessary   - Allow adequate time for meals  - Recommend/ encourage appropriate diets, oral nutritional supplements, and vitamin/mineral supplements  - Order, calculate, and assess calorie counts as needed  - Recommend, monitor, and adjust tube feedings and TPN/PPN based on assessed needs  - Assess need for intravenous fluids  - Provide specific nutrition/hydration education as appropriate  - Include patient/family/caregiver in decisions related to nutrition  Outcome: Not Progressing

## 2021-06-29 NOTE — PROGRESS NOTES
NEUROLOGY RESIDENCY PROGRESS NOTE     Name: Elyssa Frost   Age & Sex: 60 y o  female   MRN: [id number]  Unit/Bed#: -01   Encounter: [id number]    ASSESSMENT & PLAN     * Encephalopathy  Assessment & Plan  · Presenting with echolalia/pralalia, confusion, and possible initial RLE weakness  Last known normal around 10 PM the night before admission  Tardive dyskinesia chronic  Accompanying tachycardia, mucosal dryness, urinary retention, raising concerns for anticholinergic toxicity  Etiology toxic metabolic vs polypharmacy vs stroke vs infectious  · Patient on multiple psychiatric and pain outpatient medications  Currently holding medications, including hydroxyzine 50 mg QHS, nortriptyline 10 mg QHS, trazodone 200 mg QHS, oxybutynin 5 mg BID, pregabalin 225 mg QD, duloxetine 60 mg QD, morphine 15 mg BID, oxycodone 5 QD, Ingrezza 80 mg daily  · Toxicology consulted, and physostigmine administered, with partial relief in aphasia but not confusion  There was also increased secretions and diaphoresis afterwards  · CT/CTA negative, and lab workup revealed mild alkalosis likely secondary to hyperventilation  Tbil 1 68 and DBil 0 43, ,  Na 134  UA showed some leukocytes  On 6/28 lab workup, there was leukocytosis, hypokalemia of 2 9, and Tbil was 2 44  · Routine EEG on 6/28 - "study is consistent with a moderate generalized non-specific encephalopathy " No evidence of electrographic seizures  · MRI incompatible spinal cord stimulator  Verified with Inpatient Radiology Department  · Plan:  · Stroke Pathway  · CT head w/o contrast in lieu of MRI  · Recommend restarting Cymbalta at this time  · Recommend Infectious workup d/t uptrending WBC and increasing Bilirubin levels  · Neurology service will continue to evaluate the patient    Tardive dyskinesia  Assessment & Plan  · Chronic, involving tongue and mouth  Patient has been on multiple medications, including hydroxyzine and Ingrezza     · Currently holding psychiatric and pain medications  Hypertension  Assessment & Plan  · /103 on admission  On Norvasc 5 mg and prazosin 2 mg daily  · Current BP is 177/112  · Plan:  · Continue BP control per primary team    SUBJECTIVE     Patient was seen and examined  No acute events overnight  Perseverates on certain phrases and words, such as "I want play" and "Need help"  When asked about how she feels, she responds with the abovementioned phrases  She follows some directions, such as squeezing fingers with left hand and attempting to lift legs  Review of Systems   Unable to perform ROS: Mental status change     OBJECTIVE     Patient ID: Samantha Esteban is a 60 y o  female  Vitals:    21 2245 21 2313 21 0600 21 0723   BP: (!) 170/114 98/77  (!) 177/112   BP Location:    Left arm   Pulse: 72   76   Resp: 18   20   Temp: 97 6 °F (36 4 °C) 99 3 °F (37 4 °C)  98 5 °F (36 9 °C)   TempSrc: Oral   Oral   SpO2: 99%   95%   Weight:   93 1 kg (205 lb 3 2 oz)       Temperature:   Temp (24hrs), Av 4 °F (36 9 °C), Min:97 6 °F (36 4 °C), Max:99 3 °F (37 4 °C)    Temperature: 98 5 °F (36 9 °C)    Physical Exam  Vitals and nursing note reviewed  Constitutional:       General: She is not in acute distress  Appearance: Normal appearance  She is well-developed  HENT:      Head: Normocephalic and atraumatic  Right Ear: External ear normal       Left Ear: External ear normal       Nose: Nose normal       Mouth/Throat:      Mouth: Mucous membranes are moist    Eyes:      Extraocular Movements: Extraocular movements intact and EOM normal       Conjunctiva/sclera: Conjunctivae normal       Pupils: Pupils are equal, round, and reactive to light  Cardiovascular:      Rate and Rhythm: Normal rate and regular rhythm  Heart sounds: Normal heart sounds  Pulmonary:      Effort: Pulmonary effort is normal       Breath sounds: Normal breath sounds     Abdominal:      General: Abdomen is flat       Palpations: Abdomen is soft  Musculoskeletal:         General: Normal range of motion  Cervical back: Normal range of motion  Skin:     General: Skin is warm and dry  Neurological:      General: No focal deficit present  Mental Status: She is alert  Deep Tendon Reflexes:      Reflex Scores:       Tricep reflexes are 1+ on the right side and 1+ on the left side  Patellar reflexes are 1+ on the right side and 1+ on the left side  Psychiatric:         Cognition and Memory: Cognition is impaired  Neurologic Exam     Mental Status   Disoriented to person  Disoriented to place  Disoriented to time  Level of consciousness: alert    Cranial Nerves     CN III, IV, VI   Pupils are equal, round, and reactive to light  Extraocular motions are normal    Tardive dyskinetic movements of tongue and mouth  Motor Exam   Muscle bulk: normalUnable to properly assess as patient is not following instructions  However, patient is seen moving all limbs with minimal effort  Sensory Exam   Unable to assess at this time  Gait, Coordination, and Reflexes     Reflexes   Right triceps: 1+  Left triceps: 1+  Right patellar: 1+  Left patellar: 1+Unable to fully perform neurological testing at this time due to [address]:  I have personally reviewed pertinent films in PACS  Results from last 7 days   Lab Units 06/29/21  0930 06/28/21  0609 06/27/21  1102   WBC Thousand/uL 12 81* 11 10* 6 36   HEMOGLOBIN g/dL 15 4 15 5* 16 7*   HEMATOCRIT % 43 7 46 0 48 4*   PLATELETS Thousands/uL 339 290 298   NEUTROS PCT %  --  83*  --    MONOS PCT %  --  6  --       Results from last 7 days   Lab Units 06/29/21  0930 06/28/21  0609 06/27/21  1217 06/27/21  1102   SODIUM mmol/L 137 142  --  134*   POTASSIUM mmol/L 2 7* 2 9*  --  4 1   CHLORIDE mmol/L 109* 111*  --  105   CO2 mmol/L 16* 21  --  19*   BUN mg/dL 5 8  --  6   CREATININE mg/dL 0 61 0 84  --  0 98   CALCIUM mg/dL 8 6 9  1  --  9 5   ALK PHOS U/L  --  113 120*  --    ALT U/L  --  35 36  --    AST U/L  --  30 25  --               Results from last 7 days   Lab Units 06/27/21  1102   INR  0 94   PTT seconds 30         Results from last 7 days   Lab Units 06/27/21  1102   TROPONIN I ng/mL <0 02       IMAGING & DIAGNOSTIC TESTING     Radiology Results: I have personally reviewed pertinent films in PACS    CTA stroke alert (head/neck)   Final Result by Virginia Piña DO (06/27 1144)      No significant carotid or vertebral artery stenosis  No focal intracranial stenosis or aneurysm  Findings were directly discussed with Dr Rom Brock on 6/27/2021 11:27 AM                      Workstation performed: [id number]         CT stroke alert brain   Final Result by Virginia Piña DO (06/27 1145)      No acute intracranial abnormality           Findings were directly discussed with Dr Rom Brock on 6/27/2021 11:27 AM          Workstation performed: [id number]         CT head wo contrast    (Results Pending)     Other Diagnostic Testing: I have personally reviewed pertinent films in PACS    ACTIVE MEDICATIONS     Current Facility-Administered Medications   Medication Dose Route Frequency    acetaminophen (TYLENOL) tablet 650 mg  650 mg Oral Q6H PRN    amLODIPine (NORVASC) tablet 5 mg  5 mg Oral Daily    ascorbic acid (VITAMIN C) tablet 500 mg  500 mg Oral BID    cholecalciferol (VITAMIN D3) tablet 1,000 Units  1,000 Units Oral Daily    DULoxetine (CYMBALTA) delayed release capsule 60 mg  60 mg Oral Daily    enoxaparin (LOVENOX) subcutaneous injection 40 mg  40 mg Subcutaneous U82I    folic acid (FOLVITE) tablet 1,000 mcg  1,000 mcg Oral Daily    LORazepam (ATIVAN) injection 1 mg  1 mg Intravenous Q3H PRN    multi-electrolyte (PLASMALYTE-A/ISOLYTE-S PH 7 4) IV solution  125 mL/hr Intravenous Continuous    ondansetron (ZOFRAN) injection 4 mg  4 mg Intravenous Q6H PRN    pantoprazole (PROTONIX) EC tablet 40 mg  40 mg Oral Early Morning    potassium chloride (K-DUR,KLOR-CON) CR tablet 40 mEq  40 mEq Oral Once    potassium chloride 20 mEq IVPB (premix)  20 mEq Intravenous Once    prazosin (MINIPRESS) capsule 2 mg  2 mg Oral Daily       Prior to Admission medications    Medication Sig Start Date End Date Taking?  Authorizing Provider   acetaminophen (TYLENOL) 325 mg tablet Take 2 tablets (650 mg total) by mouth every 8 (eight) hours as needed for mild pain 3/9/21  Yes Michelle Smith,    amLODIPine (NORVASC) 5 mg tablet Take 1 tablet (5 mg total) by mouth daily 6/5/21 9/3/21 Yes Jw Leonardo MD   [facility] Vitamin C 500 MG tablet TAKE 1 TABLET BY MOUTH TWICE A DAY 4/14/21  Yes Jw Leonardo MD   DULoxetine (CYMBALTA) 60 mg delayed release capsule TAKE 1 CAPSULE BY MOUTH EVERY DAY 4/14/21  Yes Jw Leonardo MD   ferrous sulfate 324 (65 Fe) mg TAKE 1 TABLET (324 MG TOTAL) BY MOUTH 2 (TWO) TIMES A DAY BEFORE MEALS 4/13/21  Yes Jw Leonardo MD   folic acid (FOLVITE) 1 mg tablet TAKE 1 TABLET BY MOUTH EVERY DAY 4/14/21  Yes Jw Leonardo MD   hydrOXYzine HCL (ATARAX) 50 mg tablet TAKE 1 TABLET (50 MG TOTAL) BY MOUTH DAILY AT BEDTIME AS NEEDED FOR ANXIETY (INSOMNIA) 6/3/21  Yes Jw Leonardo MD   lidocaine (LMX) 4 % cream Apply topically as needed for mild pain 7/16/20  Yes Enoch Casillas DO   LORazepam (ATIVAN) 0 5 mg tablet Take by mouth every 6 (six) hours 6/3/21  Yes Historical Provider, MD   morphine (MS CONTIN) 15 mg 12 hr tablet Take 1 tablet (15 mg total) by mouth 2 (two) times a day Hold if sedatedMax Daily Amount: 30 mg 6/25/21  Yes Sander Amaya MD   Mouthwashes (Biotene Dry Mouth) LIQD Apply 5 mL to the mouth or throat daily   Yes Historical Provider, MD   nortriptyline (PAMELOR) 10 mg capsule Take 1 capsule (10 mg total) by mouth daily at bedtime 4/16/21  Yes Cory Aldana DO   orlistat (AYDEN) 60 MG capsule Take 60 mg by mouth 3 (three) times a day with meals   Yes Historical Provider, MD   oxyCODONE ([facility]) 5 mg immediate release tablet Take 1 tablet (5 mg total) by mouth dailyMax Daily Amount: 5 mg 6/25/21  Yes Sonja Schwartz MD   Polyethylene Glycol 3350 (MIRALAX PO) Take by mouth   Yes Historical Provider, MD   prazosin (MINIPRESS) 2 mg capsule TAKE 1 CAPSULE BY MOUTH EVERY DAY 7/9/20  Yes Tan Pitts MD   pregabalin (LYRICA) 225 MG capsule TAKE 1 CAPSULE (225 MG TOTAL) BY MOUTH EVERY 12 (TWELVE) HOURS 4/29/21  Yes Ghada Gomes DO   traZODone (DESYREL) 100 mg tablet Take 100 mg by mouth daily at bedtime 2 tablets daily at bedtime   Yes Historical Provider, MD   Valbenazine Tosylate (Ingrezza) 80 MG CAPS Take 80 mg by mouth daily 3/17/21  Yes Kaitlynn Wood MD   bisacodyl (DULCOLAX) 5 mg EC tablet Take 5 mg by mouth daily as needed for constipation  Patient not taking: Reported on 6/27/2021    Historical Provider, MD   busPIRone (BUSPAR) 15 mg tablet Take 1 tablet (15 mg total) by mouth 3 (three) times a day  Patient not taking: Reported on 6/23/2021 3/17/21 6/23/21  Kaitlynn Wood MD   cetirizine (ZyrTEC) 10 mg tablet Take 10 mg by mouth as needed   Patient not taking: Reported on 6/27/2021 11/19/20   Historical Provider, MD   cholecalciferol (VITAMIN D3) 1,000 units tablet Take 1 tablet (1,000 Units total) by mouth daily 8/4/20 6/23/21  Kaitlynn Wood MD   Diapers & Supplies (Huggies Pull-Ups) MISC Use 3 (three) times a day  Patient not taking: Reported on 6/27/2021 4/20/21   Paola Omer DO   Diclofenac Sodium (VOLTAREN) 1 % Apply 2 g topically 4 (four) times a day 6/28/21   Giselle Post MD   ipratropium-albuterol (DUO-NEB) 0 5-2 5 mg/3 mL nebulizer solution Take 1 vial (3 mL total) by nebulization every 6 (six) hours as needed for wheezing or shortness of breath  Patient not taking: Reported on 6/10/2021 3/29/21   Darlina Cushing, DO   Multiple Vitamins-Minerals (multivitamin with minerals) tablet Take 1 tablet by mouth daily  Patient not taking: Reported on 6/27/2021 8/18/20   Zhou Duran MD naloxone (NARCAN) 4 mg/0 1 mL nasal spray Administer 1 spray into a nostril  If no response after 2-3 minutes, give another dose in the other nostril using a new spray    Patient not taking: Reported on 6/10/2021 3/29/21   Seth Duran DO   omeprazole (PriLOSEC) 20 mg delayed release capsule Take 1 capsule (20 mg total) by mouth daily 3/17/21 6/15/21  Robert Bond MD   ondansetron (ZOFRAN-ODT) 4 mg disintegrating tablet Take 1 tablet (4 mg total) by mouth every 6 (six) hours as needed for nausea or vomiting  Patient not taking: Reported on 6/27/2021 9/17/20   Ciarra Aldana DO   oxybutynin (DITROPAN) 5 mg tablet Take 1 tablet (5 mg total) by mouth 2 (two) times a day 3/17/21 6/15/21  Robert Bond MD   potassium chloride (MICRO-K) 10 MEQ CR capsule Take 10 mEq by mouth 2 (two) times a day Two tabs, two times daily  Patient not taking: Reported on 6/27/2021    Historical Provider, MD   QUEtiapine (SEROquel) 300 mg tablet Take 300 mg by mouth daily at bedtime  Patient not taking: Reported on 6/27/2021    Historical Provider, MD   Respiratory Therapy Supplies (Nebulizer) YUDY Use as needed (Shortness of breath)  Patient not taking: Reported on 4/20/2021 3/29/21   Sultana Her DO   Sennosides (SENEXON PO) Take by mouth  Patient not taking: Reported on 6/27/2021    Historical Provider, MD   SIMETHICONE PO Take by mouth as needed   Patient not taking: Reported on 6/27/2021    Historical Provider, MD   sodium chloride (OCEAN) 0 65 % nasal spray 2 sprays into each nostril as needed  Patient not taking: Reported on 6/27/2021    Historical Provider, MD   triamcinolone (KENALOG) 0 025 % cream Apply topically 2 (two) times a day  Patient not taking: Reported on 6/27/2021 7/16/20   Bill Pascual DO     VTE Pharmacologic Prophylaxis: Enoxaparin (Lovenox)  VTE Mechanical Prophylaxis: sequential compression device  ==  MD Shahida Mercado's Psychiatry Resident, PGY-1

## 2021-06-29 NOTE — PROGRESS NOTES
INTERNAL MEDICINE RESIDENCY PROGRESS NOTE     Name: Pipo Hairston   Age & Sex: 60 y o  female   MRN: [id number]  Unit/Bed#: -01   Encounter: [id number]  Team: SOD Team A    PATIENT INFORMATION     Name: Pipo Hairston   Age & Sex: 60 y o  female   MRN: [id number]  Hospital Stay Days: 2    ASSESSMENT/PLAN     Principal Problem:    Encephalopathy  Active Problems:    Chronic pain syndrome    Anxiety    Bipolar II disorder (HCC)    Esophageal reflux    Hypertension    Hypokalemia    Opioid dependence ([facility] [address] )    Tardive dyskinesia    Altered mental status      * Encephalopathy  Assessment & Plan  d/t polypharmacy a/e/b altered mental status, multiple anticholinergic and pain medications treated with IV Ativan, holding medications  Per her son, the patient woke up on the morning of 6/27 with altered mental status  She was incoherent and could not be redirected  Neurology consult in the emergency department for stroke rule out  CT of the head and CT angiogram of the head and neck negative for any intracranial abnormality  Doubt CVA at this time  Possible anticholinergic toxicity  Patient did not respond to physostigmine as anticipated  Doubt infectious causes with no leukocytosis, negative UA, and no fever  Doubt hepatic encephalopathy with no history of hepatitis or alcoholism  Concern for polypharmacy      Plan:  · Medication reconciliation done with the patient's son with medications in hand  · Will discontinue a majority of medications including: hydroxyzine 50 mg QHS, nortriptyline 10 mg QHS, trazodone 200 mg QHS, oxybutynin 5 mg BID, pregabalin 225 mg QD, duloxetine 60 mg QD, morphine 15 mg BID, oxycodone 5 QD, Ingrezza 80 mg daily  · According to son, the patient is no longer taking:  Cetirizine 10 mg QD, buspirone 15 mg TID, quetiapine 300 mg QHS (has replaced this with lorazepam 0 5 mg Q6H)  · MRI of brain incompatible with spinal stimulator, will follow up with CT head  · Continuous telemetry ordered for stroke pathway  · EEG on 6/28: was consistent with moderate generalized non-specific encephalopathy, no electrographic seizures  · Toxicology, neurology and psychiatry consulted, appreciate recommendations  · Urinary retention protocol in place  · F/U blood cultures  · F/U liver function panel  · Lorazepam IV 1 mg Q3H PRN for agitation  · Home Cymbalta 60 mg daily restarted  · Neurochecks every 4 hours  · Regulate sleep-wake cycle  · Monitor for improvement off medications  · Patient has been evaluated by speech for dysphagia, Level I dysphagia diet recommended and ordered      Tardive dyskinesia  Assessment & Plan  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  Patient taking Ingrezza 80 mg QD  Plan:  · Hold medication in the setting of acute encephalopathy    Opioid dependence ([facility] [address] )  Assessment & Plan  Patient with a long history of chronic pain and opioid abuse  Current regimen includes oxycodone 5 mg daily and morphine 15 mg twice daily  UDS positive for opioids  Plan:  · Hold these medications in the setting of acute encephalopathy    Hypokalemia  Assessment & Plan  Patient was noted to have a K of 2 9 on 6/28 and K of 2 7 on 6/29  She has had a few episodes of loose bowel movements since she has been here, etiology of loose bowel movements likely related to opoid withdrawal     Plan  · Follow up stool studies: ova and parasite, fecal leukocyte  · C  Dif negative, stool enteric panel negative  · Patient was given ondansetron 4 mg Q6H PRN for nausea  · K related with 2x 40 K PO and 20 K IV   · Follow up 1400 BMP  · Continue to monitor BMP and repleat K as needed  · Check Magnisium      Hypertension  Assessment & Plan  Patient presented hypertensive, resolved without intervention  Blood pressure is currently stable      Plan:  · Continue amlodipine 5 mg daily and prazosin 2 mg daily  · Monitor blood pressure daily    Esophageal reflux  Assessment & Plan  Currently stable  Home medication includes omeprazole 20 mg daily  Plan:  · Substitute pantoprazole 40 mg daily, omeprazole not on formulary    Bipolar II disorder [facility])  Assessment & Plan  Patient previously followed with Psychiatry, last visit in 2018  The patient recently had a stay in a behavior health unit in May 2021 after she signed 201 after coming to the emergency department with panic attacks  According to her son, after that encounter her quetiapine was discontinued and she was given lorazepam 0 5 mg every 6 hours  Her buspirone was also discontinued  Plan:  · Continue to monitor off these medications in the setting of her acute encephalopathy  · Psychiatry consulted     Anxiety  Assessment & Plan  Patient with an extensive history of anxiety  Recently signed 201 in May 2021 for anxiety attacks  Patient's current home regimen includes nortriptyline 10 mg daily, hydroxyzine 50 mg daily at bedtime, duloxetine 60 mg daily, and lorazepam 0 5 mg every 6 hours  Patient was previously taking buspirone and quetiapine, but since her admission for 201 she has not been taking them according to worse on  Plan:  · Continue to hold all these medications in the setting of acute encephalopathy possibly due to polypharmacy  · patient has lorazepam 1 mg Q3H PRN for agitation    Chronic pain syndrome  Assessment & Plan  Patient follows with Neurosurgery for chronic lower back pain  She had a thoracolumbar fusion years ago in [place]  She just recently had a nerve stimulator placed in February 2020  Home pain regimen includes oxycodone 5 mg daily, morphine 15 mg twice daily, pregabalin 225 mg daily, duloxetine 60 mg daily      Plan:  · In the setting of encephalopathy possibly secondary to polypharmacy, will hold all these medications at this time  · If the patient's pain persists, may consider introducing other forms of analgesia or slowly introducing some of her home medications  · Home Cymbalta 60 mg daily restarted for pain  · Tylenol 650 mg every 6 hours as needed for pain      Disposition:  Patient continues to be encephalopathic, continue current level of care    SUBJECTIVE     Patient seen and examined  Patient continued having hypertensive episodes with agitation overnight with SBP 170s and was given ativan 1mg  Patient still had altered mental status this morning, although she was calmer and did not try to get out of bed  She would not speak but occasionally grunted in response to questions  She was moving less than yesterday upon examination, but nurses reported frequent attempts to climb out of bed throughout the night and limbs were restrained  She was still unable to follow commands, but seemed to be tracking movement with her eyes more  ROS not possible due to mental status  OBJECTIVE     Vitals:    21 2313 21 0600 21 0723 21 1327   BP: 98/77  (!) 177/112 (!) 160/108   BP Location:   Left arm    Pulse:   76 80   Resp:   20 18   Temp: 99 3 °F (37 4 °C)  98 5 °F (36 9 °C) 98 6 °F (37 °C)   TempSrc:   Oral    SpO2:   95% 99%   Weight:  93 1 kg (205 lb 3 2 oz)        Temperature:   Temp (24hrs), Av 4 °F (36 9 °C), Min:97 6 °F (36 4 °C), Max:99 3 °F (37 4 °C)    Temperature: 98 5 °F (36 9 °C)  Intake & Output:  I/O       701 -  0700 701 -  07 -  0700    P  O   0     I V  (mL/kg) 1364 6 (14 9) 1233 3 (13 2)     IV Piggyback 1000      Total Intake(mL/kg) 2364 6 (25 8) 1233 3 (13 2)     Urine (mL/kg/hr) 1850 1550 (0 7) 1200 (2 3)    Emesis/NG output 0      Stool 0      Total Output 1850 1550 1200    Net +514 6 -316 7 -1200           Unmeasured Stool Occurrence 1 x      Unmeasured Emesis Occurrence 1 x          Weights:        Body mass index is 38 77 kg/m²    Weight (last 2 days)     Date/Time   Weight    21 06   93 1 (205 2)    21 0600   91 7 (202 1)            Physical Exam  Constitutional:       General: She is not in acute distress  Appearance: She is obese  HENT:      Head: Normocephalic and atraumatic  Mouth/Throat:      Comments: Some motion consistent with tardive dyskinesia   Eyes:      General:         Right eye: Discharge present  Left eye: Discharge present  Comments: Left conjunctiva erythema  Bilateral yellow crusting discharge   Cardiovascular:      Rate and Rhythm: Normal rate and regular rhythm  Pulses: Normal pulses  Heart sounds: Normal heart sounds  Pulmonary:      Effort: Pulmonary effort is normal       Breath sounds: Normal breath sounds  Abdominal:      Palpations: Abdomen is soft  Tenderness: There is no abdominal tenderness  There is no guarding or rebound  Musculoskeletal:      Right lower leg: No edema  Left lower leg: No edema  Lymphadenopathy:      Cervical: No cervical adenopathy  Skin:     General: Skin is warm and dry  Neurological:      Mental Status: She is alert  Comments: Patient was aphasic and only made unintelligible grunting noises when asked questions  She was alert and would track me with her eyes, but she did not respond to commands  Patient was calmer today and was not trying to get up and out of bed  She was moving all 4 limbs equally    Psychiatric:      Comments: Unable to assess psychiatric state due to [address] [place] [address] Avenue: I have personally reviewed pertinent reports    Results from last 7 days   Lab Units 06/29/21  0930 06/28/21  0609 06/27/21  1102   WBC Thousand/uL 12 81* 11 10* 6 36   HEMOGLOBIN g/dL 15 4 15 5* 16 7*   HEMATOCRIT % 43 7 46 0 48 4*   PLATELETS Thousands/uL 339 290 298   NEUTROS PCT %  --  83*  --    MONOS PCT %  --  6  --       Results from last 7 days   Lab Units 06/29/21  0930 06/28/21  0609 06/27/21  1217 06/27/21  1102   POTASSIUM mmol/L 2 7* 2 9*  --  4 1   CHLORIDE mmol/L 109* 111*  --  105   CO2 mmol/L 16* 21  --  19*   BUN mg/dL 5 8  --  6   CREATININE mg/dL 0 61 0 84  -- 0 98   CALCIUM mg/dL 8 6 9 1  --  9 5   ALK PHOS U/L 110 113 120*  --    ALT U/L 37 35 36  --    AST U/L 31 30 25  --               Results from last 7 days   Lab Units 06/27/21  1102   INR  0 94   PTT seconds 30         Results from last 7 days   Lab Units 06/27/21  1102   TROPONIN I ng/mL <0 02       IMAGING & DIAGNOSTIC TESTING     Radiology Results: I have personally reviewed pertinent reports  CT stroke alert brain    Result Date: 6/27/2021  Impression: No acute intracranial abnormality  Findings were directly discussed with Dr Michael Wood on 6/27/2021 11:27 AM  Workstation performed: [id number]     CTA stroke alert (head/neck)    Result Date: 6/27/2021  Impression: No significant carotid or vertebral artery stenosis  No focal intracranial stenosis or aneurysm  Findings were directly discussed with Dr Michael Wood on 6/27/2021 11:27 AM  Workstation performed: [id number]     Other Diagnostic Testing: I have personally reviewed pertinent reports      ACTIVE MEDICATIONS     Current Facility-Administered Medications   Medication Dose Route Frequency    acetaminophen (TYLENOL) tablet 650 mg  650 mg Oral Q6H PRN    amLODIPine (NORVASC) tablet 5 mg  5 mg Oral Daily    ascorbic acid (VITAMIN C) tablet 500 mg  500 mg Oral BID    cholecalciferol (VITAMIN D3) tablet 1,000 Units  1,000 Units Oral Daily    DULoxetine (CYMBALTA) delayed release capsule 60 mg  60 mg Oral Daily    enoxaparin (LOVENOX) subcutaneous injection 40 mg  40 mg Subcutaneous M44A    folic acid (FOLVITE) tablet 1,000 mcg  1,000 mcg Oral Daily    LORazepam (ATIVAN) injection 1 mg  1 mg Intravenous Q3H PRN    multi-electrolyte (PLASMALYTE-A/ISOLYTE-S PH 7 4) IV solution  125 mL/hr Intravenous Continuous    ondansetron (ZOFRAN) injection 4 mg  4 mg Intravenous Q6H PRN    pantoprazole (PROTONIX) EC tablet 40 mg  40 mg Oral Early Morning    potassium chloride (K-DUR,KLOR-CON) CR tablet 40 mEq  40 mEq Oral Once    prazosin (MINIPRESS) capsule 2 mg  2 mg Oral Daily       VTE Pharmacologic Prophylaxis: Enoxaparin (Lovenox)  VTE Mechanical Prophylaxis: sequential compression device    Portions of the record may have been created with voice recognition software  Occasional wrong word or "sound a like" substitutions may have occurred due to the inherent limitations of voice recognition software    Read the chart carefully and recognize, using context, where substitutions have occurred   ==  Babette Harada MS3  Diamond Parikh, MS4   [address] [place] [address]

## 2021-06-29 NOTE — SPEECH THERAPY NOTE
Speech Language/Pathology    Speech/Language Pathology Progress Note    Patient Name: Indira CORDEROMI'S Date: 2021     Problem List  Principal Problem:    Encephalopathy  Active Problems:    Chronic pain syndrome    Anxiety    Bipolar II disorder (HCC)    Esophageal reflux    Hypertension    Hypokalemia    Opioid dependence ([facility])    Tardive dyskinesia    Altered mental status       Past Medical History  Past Medical History:   Diagnosis Date    Acid reflux     Anxiety     RESOLVED: [id number]    Arthritis     Bipolar 2 disorder (HCC)     FOLLOWS WITH PSYCHIATRIST  CONTINUE LAMOTRIGINE; RESOLVED: [id number]    Depression     Familial tremor     both hands    Fibromyalgia     LAST ASSESSED: [id number]    Hearing aid worn     left ear    [place] (hard of hearing)     left ear    Hypertension     Left-sided weakness     Lower back pain     Memory loss of unknown cause     long and short term    Migraine     Obesity     Obesity, Class II, BMI 35-39 9     Overactive bladder     Panic attack     Post traumatic stress disorder     Seasonal allergies     Stroke [facility])     questionable stroke 2009    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    CONT ASA 81 MG DAILY; RESOLVED: [id number]    Urinary incontinence     Wears dentures     partial lower / full upper    Wears glasses         Past Surgical History  Past Surgical History:   Procedure Laterality Date    BACK SURGERY       SECTION      COLONOSCOPY      RESOLVED: [id number]    EAR SURGERY      EGD      HYSTERECTOMY  2004    MYRINGOTOMY W/ TUBES Left     NECK SURGERY  2019    PA CYSTOURETHROSCOPY N/A 2016    Procedure: CYSTOSCOPY, BOTOX INJECTION;  Surgeon: Stacia Woodward MD;  Location: [facility];  Service: Gynecology    PA IMPLANT SPINAL NEUROSTIM/ Right 2/10/2021    Procedure: REPLACEMENT IMPLANTABLE PULSE GENERATOR DORSAL SPINAL COLUMN STIMULATOR, RIGHT;  Surgeon: Vibha Solano MD; Location: [facility];  Service: Neurosurgery    ND PERCUT IMPLNT NEUROELECT,EPIDURAL Right 7/28/2020    Procedure: INSERTION THORACIC DORSAL COLUMN SPINAL CORD STIMULATOR PERCUTANEOUS W IMPLANTABLE PULSE GENERATOR, RIGHT;  Surgeon: Christian Griffiths MD;  Location:  MAIN OR;  Service: Neurosurgery    [facility]    UPPER GASTROINTESTINAL ENDOSCOPY  09/2020         Subjective:  Pt was awake sitting upright in bed  Slightly more alert and receptive to questions today  Repeatedly said "Help" throughout session  Current Diet: NPO (Recommended level 1 puree/thin liquids)  Objective:  Pt seen for ongoing dx dysphagia tx  Pt trialed w/  pudding and presented with mildly delayed retrieval from spoon, and mildly reduced yet effective a-p transfer  Pt initially declined toast and other solids, but eventually agreed to a bite of toast Pt is edentulous and did not have dentures present in room  Presented with fair bite strength, reduced mastication with prolonged holding of bolus in mouth, and minimal oral residue  Contiued to roll material around prior to initiating transfer  Took small cup sips of thin liquids (orange juice) to help clear residue and did not present with any overt s/s of aspiration  Pt requested and was given tea, but then declined taking any sips with speech therapist      Assessment:  Pt continues to present with oral stage difficulties with solids characterized by reduced retrieval/mastication and noted oral residue  Continues to tolerate thin liquids with no overt s/s of aspiration      Plan/Recommendations:  Continue to rec'd level 1 puree/thins  Frequent and thorough oral care  Check oral cavity, slow po  Will f/u as able

## 2021-06-29 NOTE — PLAN OF CARE
Problem: Potential for Falls  Goal: Patient will remain free of falls  Description: INTERVENTIONS:  - Educate patient/family on patient safety including physical limitations  - Instruct patient to call for assistance with activity   - Consult OT/PT to assist with strengthening/mobility   - Keep Call bell within reach  - Keep bed low and locked with side rails adjusted as appropriate  - Keep care items and personal belongings within reach  - Initiate and maintain comfort rounds  - Make Fall Risk Sign visible to staff  Problem: MOBILITY - ADULT  Goal: Maintain or return to baseline ADL function  Description: INTERVENTIONS:  -  Assess patient's ability to carry out ADLs; assess patient's baseline for ADL function and identify physical deficits which impact ability to perform ADLs (bathing, care of mouth/teeth, toileting, grooming, dressing, etc )  - Assess/evaluate cause of self-care deficits   - Assess range of motion  - Assess patient's mobility; develop plan if impaired  - Assess patient's need for assistive devices and provide as appropriate  - Encourage maximum independence but intervene and supervise when necessary  - Involve family in performance of ADLs  - Assess for home care needs following discharge   - Consider OT consult to assist with ADL evaluation and planning for discharge  - Provide patient education as appropriate  Outcome: Progressing  Problem: NEUROSENSORY - ADULT  Goal: Achieves stable or improved neurological status  Description: INTERVENTIONS  - Monitor and report changes in neurological status  - Monitor vital signs such as temperature, blood pressure, glucose, and any other labs ordered   - Initiate measures to prevent increased intracranial pressure  - Monitor for seizure activity and implement precautions if appropriate      Outcome: Progressing     Problem: SAFETY ADULT  Goal: Patient will remain free of falls  Description: INTERVENTIONS:  - Educate patient/family on patient safety including physical limitations  - Instruct patient to call for assistance with activity   - Consult OT/PT to assist with strengthening/mobility   - Keep Call bell within reach  - Keep bed low and locked with side rails adjusted as appropriate  - Keep care items and personal belongings within reach  - Initiate and maintain comfort rounds  - Make Fall Risk Sign visible to staff  Problem: Knowledge Deficit  Goal: Patient/family/caregiver demonstrates understanding of disease process, treatment plan, medications, and discharge instructions  Description: Complete learning assessment and assess knowledge base    Interventions:  - Provide teaching at level of understanding  - Provide teaching via preferred learning methods  Outcome: Progressing     Problem: Prexisting or High Potential for Compromised Skin Integrity  Goal: Skin integrity is maintained or improved  Description: INTERVENTIONS:  - Identify patients at risk for skin breakdown  - Assess and monitor skin integrity  - Assess and monitor nutrition and hydration status  - Monitor labs   - Assess for incontinence   - Turn and reposition patient  - Assist with mobility/ambulation  - Relieve pressure over bony prominences  - Avoid friction and shearing  - Provide appropriate hygiene as needed including keeping skin clean and dry  - Evaluate need for skin moisturizer/barrier cream  - Collaborate with interdisciplinary team   - Patient/family teaching  - Consider wound care consult   Outcome: Progressing     - Consider moving patient to room near nurses station  Outcome: Progressing        - Apply yellow socks and bracelet for high fall risk patients  - Consider moving patient to room near nurses station  Outcome: Progressing

## 2021-06-30 ENCOUNTER — APPOINTMENT (INPATIENT)
Dept: RADIOLOGY | Facility: HOSPITAL | Age: 56
DRG: 092 | End: 2021-06-30
Payer: COMMERCIAL

## 2021-06-30 LAB
ALBUMIN SERPL BCP-MCNC: 3.3 G/DL (ref 3.5–5)
ALP SERPL-CCNC: 98 U/L (ref 46–116)
ALT SERPL W P-5'-P-CCNC: 33 U/L (ref 12–78)
ANION GAP SERPL CALCULATED.3IONS-SCNC: 8 MMOL/L (ref 4–13)
AST SERPL W P-5'-P-CCNC: 25 U/L (ref 5–45)
BASOPHILS # BLD AUTO: 0.06 THOUSANDS/ΜL (ref 0–0.1)
BASOPHILS NFR BLD AUTO: 1 % (ref 0–1)
BILIRUB SERPL-MCNC: 1.64 MG/DL (ref 0.2–1)
BUN SERPL-MCNC: 5 MG/DL (ref 5–25)
CALCIUM ALBUM COR SERPL-MCNC: 9.4 MG/DL (ref 8.3–10.1)
CALCIUM SERPL-MCNC: 8.8 MG/DL (ref 8.3–10.1)
CHLORIDE SERPL-SCNC: 109 MMOL/L (ref 100–108)
CO2 SERPL-SCNC: 20 MMOL/L (ref 21–32)
CREAT SERPL-MCNC: 0.48 MG/DL (ref 0.6–1.3)
EOSINOPHIL # BLD AUTO: 0.11 THOUSAND/ΜL (ref 0–0.61)
EOSINOPHIL NFR BLD AUTO: 1 % (ref 0–6)
ERYTHROCYTE [DISTWIDTH] IN BLOOD BY AUTOMATED COUNT: 13.9 % (ref 11.6–15.1)
GFR SERPL CREATININE-BSD FRML MDRD: 126 ML/MIN/1.73SQ M
GLUCOSE SERPL-MCNC: 104 MG/DL (ref 65–140)
HCT VFR BLD AUTO: 40.2 % (ref 34.8–46.1)
HGB BLD-MCNC: 13.7 G/DL (ref 11.5–15.4)
HIV 1+2 AB+HIV1 P24 AG SERPL QL IA: NORMAL
IMM GRANULOCYTES # BLD AUTO: 0.04 THOUSAND/UL (ref 0–0.2)
IMM GRANULOCYTES NFR BLD AUTO: 0 % (ref 0–2)
LYMPHOCYTES # BLD AUTO: 1.82 THOUSANDS/ΜL (ref 0.6–4.47)
LYMPHOCYTES NFR BLD AUTO: 20 % (ref 14–44)
MCH RBC QN AUTO: 29.7 PG (ref 26.8–34.3)
MCHC RBC AUTO-ENTMCNC: 34.1 G/DL (ref 31.4–37.4)
MCV RBC AUTO: 87 FL (ref 82–98)
MONOCYTES # BLD AUTO: 0.74 THOUSAND/ΜL (ref 0.17–1.22)
MONOCYTES NFR BLD AUTO: 8 % (ref 4–12)
NEUTROPHILS # BLD AUTO: 6.31 THOUSANDS/ΜL (ref 1.85–7.62)
NEUTS SEG NFR BLD AUTO: 70 % (ref 43–75)
NRBC BLD AUTO-RTO: 0 /100 WBCS
PLATELET # BLD AUTO: 302 THOUSANDS/UL (ref 149–390)
PMV BLD AUTO: 10 FL (ref 8.9–12.7)
POTASSIUM SERPL-SCNC: 3.1 MMOL/L (ref 3.5–5.3)
PROT SERPL-MCNC: 6.9 G/DL (ref 6.4–8.2)
RBC # BLD AUTO: 4.62 MILLION/UL (ref 3.81–5.12)
RPR SER QL: NORMAL
SODIUM SERPL-SCNC: 137 MMOL/L (ref 136–145)
TSH SERPL DL<=0.05 MIU/L-ACNC: 2.06 UIU/ML (ref 0.36–3.74)
WBC # BLD AUTO: 9.08 THOUSAND/UL (ref 4.31–10.16)

## 2021-06-30 PROCEDURE — 99233 SBSQ HOSP IP/OBS HIGH 50: CPT | Performed by: INTERNAL MEDICINE

## 2021-06-30 PROCEDURE — 85025 COMPLETE CBC W/AUTO DIFF WBC: CPT | Performed by: STUDENT IN AN ORGANIZED HEALTH CARE EDUCATION/TRAINING PROGRAM

## 2021-06-30 PROCEDURE — 80053 COMPREHEN METABOLIC PANEL: CPT | Performed by: STUDENT IN AN ORGANIZED HEALTH CARE EDUCATION/TRAINING PROGRAM

## 2021-06-30 PROCEDURE — 86618 LYME DISEASE ANTIBODY: CPT | Performed by: PSYCHIATRY & NEUROLOGY

## 2021-06-30 PROCEDURE — 84443 ASSAY THYROID STIM HORMONE: CPT | Performed by: PSYCHIATRY & NEUROLOGY

## 2021-06-30 PROCEDURE — 99231 SBSQ HOSP IP/OBS SF/LOW 25: CPT | Performed by: PSYCHIATRY & NEUROLOGY

## 2021-06-30 PROCEDURE — 76705 ECHO EXAM OF ABDOMEN: CPT

## 2021-06-30 PROCEDURE — NC001 PR NO CHARGE: Performed by: PSYCHIATRY & NEUROLOGY

## 2021-06-30 PROCEDURE — 97530 THERAPEUTIC ACTIVITIES: CPT

## 2021-06-30 PROCEDURE — 97535 SELF CARE MNGMENT TRAINING: CPT

## 2021-06-30 RX ORDER — OXYCODONE HCL 5 MG/5 ML
5 SOLUTION, ORAL ORAL EVERY 6 HOURS
Status: DISCONTINUED | OUTPATIENT
Start: 2021-06-30 | End: 2021-07-03

## 2021-06-30 RX ORDER — POTASSIUM CHLORIDE 20 MEQ/1
40 TABLET, EXTENDED RELEASE ORAL 2 TIMES DAILY
Status: DISCONTINUED | OUTPATIENT
Start: 2021-06-30 | End: 2021-07-01

## 2021-06-30 RX ORDER — OXYCODONE HCL 5 MG/5 ML
5 SOLUTION, ORAL ORAL EVERY 6 HOURS PRN
Status: DISCONTINUED | OUTPATIENT
Start: 2021-06-30 | End: 2021-07-03

## 2021-06-30 RX ORDER — POTASSIUM CHLORIDE 20MEQ/15ML
40 LIQUID (ML) ORAL 2 TIMES DAILY
Status: DISCONTINUED | OUTPATIENT
Start: 2021-06-30 | End: 2021-06-30

## 2021-06-30 RX ORDER — ACETAMINOPHEN 325 MG/1
650 TABLET ORAL EVERY 6 HOURS PRN
Status: DISCONTINUED | OUTPATIENT
Start: 2021-06-30 | End: 2021-07-07 | Stop reason: HOSPADM

## 2021-06-30 RX ADMIN — AMLODIPINE BESYLATE 5 MG: 5 TABLET ORAL at 08:20

## 2021-06-30 RX ADMIN — DICLOFENAC SODIUM 2 G: 10 GEL TOPICAL at 17:27

## 2021-06-30 RX ADMIN — OXYCODONE HYDROCHLORIDE 5 MG: 5 SOLUTION ORAL at 17:27

## 2021-06-30 RX ADMIN — OXYCODONE HYDROCHLORIDE AND ACETAMINOPHEN 500 MG: 500 TABLET ORAL at 17:27

## 2021-06-30 RX ADMIN — ATORVASTATIN CALCIUM 40 MG: 40 TABLET, FILM COATED ORAL at 17:27

## 2021-06-30 RX ADMIN — SODIUM CHLORIDE, SODIUM LACTATE, POTASSIUM CHLORIDE, AND CALCIUM CHLORIDE 125 ML/HR: .6; .31; .03; .02 INJECTION, SOLUTION INTRAVENOUS at 06:59

## 2021-06-30 RX ADMIN — DULOXETINE HYDROCHLORIDE 60 MG: 60 CAPSULE, DELAYED RELEASE ORAL at 08:20

## 2021-06-30 RX ADMIN — SODIUM CHLORIDE, SODIUM LACTATE, POTASSIUM CHLORIDE, AND CALCIUM CHLORIDE 125 ML/HR: .6; .31; .03; .02 INJECTION, SOLUTION INTRAVENOUS at 22:54

## 2021-06-30 RX ADMIN — DICLOFENAC SODIUM 2 G: 10 GEL TOPICAL at 12:58

## 2021-06-30 RX ADMIN — VALBENAZINE 80 MG: 80 CAPSULE ORAL at 12:58

## 2021-06-30 RX ADMIN — MORPHINE SULFATE 2 MG: 2 INJECTION, SOLUTION INTRAMUSCULAR; INTRAVENOUS at 06:58

## 2021-06-30 RX ADMIN — FOLIC ACID 1000 MCG: 1 TABLET ORAL at 08:20

## 2021-06-30 RX ADMIN — MORPHINE SULFATE 2 MG: 2 INJECTION, SOLUTION INTRAMUSCULAR; INTRAVENOUS at 01:57

## 2021-06-30 RX ADMIN — ASPIRIN 81 MG CHEWABLE TABLET 81 MG: 81 TABLET CHEWABLE at 08:20

## 2021-06-30 RX ADMIN — Medication 1000 UNITS: at 08:20

## 2021-06-30 RX ADMIN — LORAZEPAM 1 MG: 2 INJECTION INTRAMUSCULAR; INTRAVENOUS at 08:20

## 2021-06-30 RX ADMIN — SODIUM CHLORIDE, SODIUM LACTATE, POTASSIUM CHLORIDE, AND CALCIUM CHLORIDE 125 ML/HR: .6; .31; .03; .02 INJECTION, SOLUTION INTRAVENOUS at 15:10

## 2021-06-30 RX ADMIN — POTASSIUM CHLORIDE 40 MEQ: 20 SOLUTION ORAL at 12:57

## 2021-06-30 RX ADMIN — DICLOFENAC SODIUM 2 G: 10 GEL TOPICAL at 21:08

## 2021-06-30 RX ADMIN — OXYCODONE HYDROCHLORIDE 5 MG: 5 SOLUTION ORAL at 12:57

## 2021-06-30 RX ADMIN — ENOXAPARIN SODIUM 40 MG: 40 INJECTION SUBCUTANEOUS at 20:12

## 2021-06-30 RX ADMIN — OXYCODONE HYDROCHLORIDE AND ACETAMINOPHEN 500 MG: 500 TABLET ORAL at 08:20

## 2021-06-30 RX ADMIN — LORAZEPAM 1 MG: 2 INJECTION INTRAMUSCULAR; INTRAVENOUS at 21:07

## 2021-06-30 RX ADMIN — POTASSIUM CHLORIDE 40 MEQ: 1500 TABLET, EXTENDED RELEASE ORAL at 17:26

## 2021-06-30 RX ADMIN — PRAZOSIN HYDROCHLORIDE 2 MG: 2 CAPSULE ORAL at 08:28

## 2021-06-30 NOTE — PLAN OF CARE
Problem: PHYSICAL THERAPY ADULT  Goal: Performs mobility at highest level of function for planned discharge setting  See evaluation for individualized goals  Description: Treatment/Interventions: Functional transfer training, LE strengthening/ROM, Therapeutic exercise, Endurance training, Bed mobility, Gait training  Equipment Recommended:  (TBD)       See flowsheet documentation for full assessment, interventions and recommendations  Outcome: Progressing  Note: Prognosis: Guarded  Problem List: Decreased strength, Decreased range of motion, Decreased endurance, Decreased mobility, Impaired balance, Decreased coordination, Decreased cognition, Impaired judgement, Decreased safety awareness  Assessment: pt presents to PT limited by cogntiion; motivational and behavioral barriers  stating "help" for > 90% of session but unable to verbalize what she needs help with  Reassurances provided w/ minimal effect  pt requiring modAx2 for safety for completion of all mobility  standing attempts multiple times  EOB and engagement at EOB attempts w/ minimal response  ambualtion seems mostly self limited w/ pt attetmpting to sit and lay self BTB throughout- was able to left LE's onto bed w/ Lisandro x1  posey and wrist restraints fastened post session  RN aware   bed alarm intact  anticipate pt will require placement in higher level of care as long term plan  Barriers to Discharge: Inaccessible home environment, Decreased caregiver support        PT Discharge Recommendation: Post acute rehabilitation services (see assessment )     PT - OK to Discharge: Yes    See flowsheet documentation for full assessment

## 2021-06-30 NOTE — CONSULTS
And tried to evaluate this patient multiple time, she continued to be confused and perseverating in 1 sentence  She also states that she wants to go  She know her name but unable to answer any other questions  Please come about when patient medically stable if necessary       Codie Ayala MD

## 2021-06-30 NOTE — OCCUPATIONAL THERAPY NOTE
[address] Progress Note     Patient Name: Ca DENIS Date: 6/30/2021  Problem List  Principal Problem:    Encephalopathy  Active Problems:    Chronic pain syndrome    Anxiety    Bipolar II disorder (HCC)    Esophageal reflux    Hypertension    Hypokalemia    Opioid dependence ([facility] [address] )    Tardive dyskinesia    Altered mental status        06/30/21 1540   OT Last Visit   OT Visit Date 06/30/21   Note Type   Note Type Treatment   Restrictions/Precautions   Weight Bearing Precautions Per Order Yes   RUE Weight Bearing Per Order WBAT   LUE Weight Bearing Per Order WBAT   RLE Weight Bearing Per Order WBAT   LLE Weight Bearing Per Order WBAT   Other Precautions Impulsive;Cognitive; Restraints; Bed Alarm; Fall Risk  (B wrist restraints, posey waist restraint)   Lifestyle   Autonomy I basic adls and mobility with rollator - son assists prn and manages iadls   Reciprocal Relationships supportive family    Service to Others disabled   Intrinsic Gratification unable to id    Pain Assessment   Pain Assessment Tool FLACC   Pain Rating: FLACC (Rest) - Face 1   Pain Rating: FLACC (Rest) - Legs 1   Pain Rating: FLACC (Rest) - Activity 1   Pain Rating: FLACC (Rest) - Cry 1   Pain Rating: FLACC (Rest) - Consolability 1   Score: FLACC (Rest) 5   Pain Rating: FLACC (Activity) - Face 1   Pain Rating: FLACC (Activity) - Legs 1   Pain Rating: FLACC (Activity) - Activity 1   Pain Rating: FLACC (Activity) - Cry 1   Pain Rating: FLACC (Activity) - Consolability 1   Score: FLACC (Activity) 5   ADL   Eating Assistance 2  Maximal Assistance   Grooming Assistance 2  Maximal Assistance   UB Bathing Assistance 2  Maximal Assistance   LB Dressing Assistance 1  Total Assistance   Bed Mobility   Supine to Sit 3  Moderate assistance   Sit to Supine 4  Minimal assistance   Transfers   Sit to Stand 3  Moderate assistance   Stand to Sit 3  Moderate assistance   Additional Comments pt able to take a few step forward, backward and sideways with use of rollator assist x 2 and moderate cues - sits quickly/unextectedly therefore did not move far from bed - unsafe to sit oob at this time    Cognition   Overall Cognitive Status Impaired   Arousal/Participation Arousable;Lethargic;Sedated;Persistent stimuli required;Poorly responsive   Attention Difficulty attending to directions   Orientation Level Oriented to person   Memory Decreased long term memory;Decreased recall of biographical information;Decreased short term memory;Decreased recall of recent events;Decreased recall of precautions   Following Commands Follows one step commands inconsistently  (followed ~25%of commands )   Activity Tolerance   Activity Tolerance Patient limited by fatigue;Treatment limited secondary to medical complications (Comment)   Assessment   Assessment pt seen for OT session focusing on self care tasks, cognitive stimulation and overall tolerance to activity - remains poorly responsive - mostly repeating "help" throughout session but will respond to name and inconsistently follow commands and answer simple questions inconsistently - able to hold washcloth and bring to face with physical cues to initiate however demonstrates poor carryover of tasks - requires physical cues to initiate bed mobility and sit to stand transfers - does not require a lot of physical assist to complete however demonstrates poor overall safety as she will sit unexpectedly and attempt to lie back down in the bed - continue to recommend inpt rehab when medically cleared- OT to continue to follow to address goals as stated on eval    Plan   Treatment Interventions ADL retraining;Functional transfer training;UE strengthening/ROM; Cognitive reorientation;Patient/family training;Equipment evaluation/education; Compensatory technique education; Activityengagement   Goal Expiration Date 07/12/21   OT Treatment Day 1   OT Frequency 2-3x/wk   Recommendation   OT Discharge Recommendation Post acute rehabilitation services   AM-[facility] Daily Activity Inpatient   Lower Body Dressing 2   Bathing 2   Toileting 2   Upper Body Dressing 2   Grooming 2   Eating 2   Daily Activity Raw Score 12   Daily Activity Standardized Score (Calc for Raw Score >=11) 30 6   AM-[facility] Applied Cognition Inpatient   Following a Speech/Presentation 1   Understanding Ordinary Conversation 2   Taking Medications 1   Remembering Where Things Are Placed or Put Away 1   Remembering List of 4-5 Errands 1   Taking Care of Complicated Tasks 1   Applied Cognition Raw Score 7   Applied Cognition Standardized Score 15 17     The patient's raw score on the AM-PAC Daily Activity inpatient short form is 12, standardized score is 30 6, less than 39 4  Patients at this level are likely to benefit from discharge to post-acute rehabilitation services  Please refer to the recommendation of the Occupational Therapist for safe discharge planning        Mis Spence

## 2021-06-30 NOTE — PHYSICAL THERAPY NOTE
PHYSICAL THERAPY TREATMENT  NAME:  An Pedraza  DATE: 21    AGE:   60 y o  Mrn:   [id number]  ADMIT DX:  Altered mental status [R41 82]  Dystonic movements [G24 9]    Past Medical History:   Diagnosis Date    Acid reflux     Anxiety     RESOLVED: [id number]    Arthritis     Bipolar 2 disorder (HCC)     FOLLOWS WITH PSYCHIATRIST  CONTINUE LAMOTRIGINE; RESOLVED: [id number]    Depression     Familial tremor     both hands    Fibromyalgia     LAST ASSESSED: [id number]    Hearing aid worn     left ear    [place] (hard of hearing)     left ear    Hypertension     Left-sided weakness     Lower back pain     Memory loss of unknown cause     long and short term    Migraine     Obesity     Obesity, Class II, BMI 35-39 9     Overactive bladder     Panic attack     Post traumatic stress disorder     Seasonal allergies     Stroke [facility])     questionable stroke 2009    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    CONT ASA 81 MG DAILY; RESOLVED: [id number]    Urinary incontinence     Wears dentures     partial lower / full upper    Wears glasses      Past Surgical History:   Procedure Laterality Date    BACK SURGERY       SECTION      COLONOSCOPY      RESOLVED: [id number]    EAR SURGERY      EGD      HYSTERECTOMY      MYRINGOTOMY W/ TUBES Left     NECK SURGERY  2019    SC CYSTOURETHROSCOPY N/A 2016    Procedure: CYSTOSCOPY, BOTOX INJECTION;  Surgeon: Marcello Min MD;  Location: [facility];  Service: Gynecology    SC IMPLANT SPINAL NEUROSTIM/ Right 2/10/2021    Procedure: REPLACEMENT IMPLANTABLE PULSE GENERATOR DORSAL SPINAL COLUMN STIMULATOR, RIGHT;  Surgeon: Noman Sanchez MD;  [facility]: [facility];  Service: Neurosurgery    SC PERCUT IMPLNT [address] Right 2020    Procedure: INSERTION THORACIC DORSAL COLUMN SPINAL CORD STIMULATOR PERCUTANEOUS W IMPLANTABLE PULSE GENERATOR, RIGHT;  Surgeon: Noman Sanchez MD; Location:  MAIN OR;  Service: Neurosurgery    TONSILLECTOMY      TUBAL LIGATION  1986    UPPER GASTROINTESTINAL ENDOSCOPY  09/2020       Length Of Stay: 3     06/30/21 1600   PT Last Visit   PT Visit Date 06/30/21   Note Type   Note Type Treatment   Pain Assessment   Pain Assessment Tool FLACC   Pain Rating: FLACC (Rest) - Face 1   Pain Rating: FLACC (Rest) - Legs 1   Pain Rating: FLACC (Rest) - Activity 1   Pain Rating: FLACC (Rest) - Cry 1   Pain Rating: FLACC (Rest) - Consolability 1   Score: FLACC (Rest) 5   Pain Rating: FLACC (Activity) - Face 1   Pain Rating: FLACC (Activity) - Legs 1   Pain Rating: FLACC (Activity) - Activity 1   Pain Rating: FLACC (Activity) - Cry 1   Pain Rating: FLACC (Activity) - Consolability 1   Score: FLACC (Activity) 5   Restrictions/Precautions   Weight Bearing Precautions Per Order Yes   RUE Weight Bearing Per Order WBAT   LUE Weight Bearing Per Order WBAT   RLE Weight Bearing Per Order WBAT   LLE Weight Bearing Per Order WBAT   Other Precautions Impulsive;Cognitive; Chair Alarm; Bed Alarm; Restraints;Multiple lines; Fall Risk;Telemetry  [place];  B/L wrist restraints)   General   Chart Reviewed No   Family/Caregiver Present No   Cognition   Overall Cognitive Status Impaired   Arousal/Participation Arousable;Lethargic;Sedated;Persistent stimuli required;Poorly responsive   Attention Difficulty attending to directions   Orientation Level Oriented to person   Memory Decreased long term memory;Decreased recall of biographical information;Decreased short term memory;Decreased recall of recent events;Decreased recall of precautions   Following Commands Follows one step commands inconsistently   Comments repeats "help" throughout most of session- but does not verbalize what she needs help with- just repeating "help" for > 90% of session- was able to be directed w/ VC TC and AX@ for safety 2* impulsivity/ cogntion    Subjective   Subjective "help"    Bed Mobility   Supine to Sit 3  Moderate assistance   Sit to Supine 4  Minimal assistance   Additional items Impulsive   Additional Comments Lisandro EOB- poor initiation of any activity multiple STS from EOB- attempted use of rollator    Transfers   Sit to Stand 3  Moderate assistance   Additional items Assist x 1  (A of another for safety 2* cognition/ impulsivity )   Stand to Sit 3  Moderate assistance   Additional items Assist x 2  (impulsively sitting prior to being appropriately close to be)   Ambulation/Elevation   Gait Assistance 3  Moderate assist   Additional items Assist x 2;Verbal cues; Tactile cues  (HIGH FALL RISK )   Assistive Device   (personal rollator attempted for familiarity )   Distance <2'  forward and backward w/ rollator- poor effort w/ pt impulsively attempting to sit - recommend chair follow  for progression  then 3 sidesteps to [facility] w/ modA x2/ HHA w/ max cues for safety/ motivation    Balance   Static Sitting Fair   Dynamic Sitting Fair -   Static Standing Poor   Dynamic Standing Poor   Ambulatory Poor -   Endurance Deficit   Endurance Deficit Yes   Activity Tolerance   Activity Tolerance Patient limited by fatigue; Other (Comment)  (cognition/ motivation/ behavioral barriers )   Assessment   Prognosis Guarded   Problem List Decreased strength;Decreased range of motion;Decreased endurance;Decreased mobility; Impaired balance;Decreased coordination;Decreased cognition; Impaired judgement;Decreased safety awareness   Assessment pt presents to PT limited by cogntiion; motivational and behavioral barriers  stating "help" for > 90% of session but unable to verbalize what she needs help with  Reassurances provided w/ minimal effect  pt requiring modAx2 for safety for completion of all mobility  standing attempts multiple times  EOB and engagement at EOB attempts w/ minimal response   ambualtion seems mostly self limited w/ pt attetmpting to sit and lay self BTB throughout- was able to left LE's onto bed w/ Lisandro x1  posey and wrist restraints fastened post session  RN aware   bed alarm intact  anticipate pt will require placement in higher level of care as long term plan  Barriers to Discharge Inaccessible home environment;Decreased caregiver support   Goals   Patient Goals none stated/ unable    STG Expiration Date 07/12/21   PT Treatment Day 1   Plan   Treatment/Interventions ADL retraining;Functional transfer training;LE strengthening/ROM; Elevations; Therapeutic exercise; Endurance training;Cognitive reorientation;Patient/family training;Equipment eval/education; Bed mobility;Gait training; Compensatory technique education;Spoke to nursing   PT Frequency 2-3x/wk   Recommendation   PT Discharge Recommendation Post acute rehabilitation services  (see assessment )   Equipment Recommended   (has personal rollator )   AM-PAC Basic Mobility Inpatient   Turning in Bed Without Bedrails 2   Lying on Back to Sitting on Edge of Flat Bed 2   Moving Bed to Chair 2   Walk in Room 1   Climb 3-5 Stairs 1   Turning Head Towards Sound 3   Follow Simple Instructions 2            Xi Humphreys, PT

## 2021-06-30 NOTE — PROGRESS NOTES
INTERNAL MEDICINE RESIDENCY PROGRESS NOTE     Name: Ismael Fried   Age & Sex: 60 y o  female   MRN: [id number]  Unit/Bed#: [facility] 832-01   Encounter: [id number]  Team: SOD Team A    PATIENT INFORMATION     Name: Ismael Fried   Age & Sex: 60 y o  female   MRN: [id number]  Hospital Stay Days: 3    ASSESSMENT/PLAN     Principal Problem:    Encephalopathy  Active Problems:    Chronic pain syndrome    Anxiety    Bipolar II disorder (HCC)    Esophageal reflux    Hypertension    Hypokalemia    Opioid dependence ([address] )    Tardive dyskinesia    Altered mental status      * Encephalopathy  Assessment & Plan  d/t polypharmacy a/e/b altered mental status, multiple anticholinergic and pain medications treated with IV Ativan, holding medications  Per her son, the patient woke up on the morning of 6/27 with altered mental status  She was incoherent and could not be redirected  Neurology consult in the emergency department for stroke rule out  CT of the head and CT angiogram of the head and neck negative for any intracranial abnormality  Doubt CVA at this time  Possible anticholinergic toxicity  Patient did not respond to physostigmine as anticipated  Doubt infectious causes with no leukocytosis, negative UA, and no fever  Doubt hepatic encephalopathy with no history of hepatitis or alcoholism  Concern for polypharmacy      Plan:  · Medication reconciliation done with the patient's son with medications in hand  · Will discontinue a majority of medications including: hydroxyzine 50 mg QHS, nortriptyline 10 mg QHS, trazodone 200 mg QHS, oxybutynin 5 mg BID, pregabalin 225 mg QD, duloxetine 60 mg QD, morphine 15 mg BID, oxycodone 5 QD, Ingrezza 80 mg daily  · According to son, the patient is no longer taking:  Cetirizine 10 mg QD, buspirone 15 mg TID, quetiapine 300 mg QHS (has replaced this with lorazepam 0 5 mg Q6H)  · MRI of brain incompatible with spinal stimulator, will follow up with CT head was negative  · Continuous telemetry ordered for stroke pathway  · Follow up echo for stroke pathway  · Follow up US RUQ  · Follow up HIV, lyme, and B1 level  · B12 and RPR negative  · EEG on 6/28: was consistent with moderate generalized non-specific encephalopathy, no electrographic seizures  · Toxicology, neurology and psychiatry consulted, appreciate recommendations  · Urinary retention protocol in place  · F/U blood cultures  · F/U liver function panel  · Lorazepam IV 1 mg Q3H PRN for agitation  · Home Cymbalta 60 mg daily restarted  · Neurochecks every 4 hours  · Regulate sleep-wake cycle  · Monitor for improvement off medications  · Patient has been evaluated by speech for dysphagia, Level I dysphagia diet recommended and ordered        Tardive dyskinesia  Assessment & Plan  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  Patient taking Ingrezza 80 mg QD  Plan:  · Hold medication in the setting of acute encephalopathy    Opioid dependence ([facility] [address] )  Assessment & Plan  Patient with a long history of chronic pain and opioid abuse  Current regimen includes oxycodone 5 mg daily and morphine 15 mg twice daily  UDS positive for opioids  Plan:  · Medications held in setting of acute encephalopathy, patient's mental status is improving and she is in significant pain, will add back some pain coverage  · Patient restarted on Oxycodone 5 mg suspension Q6H for pain control/opioid withdrawl    Hypokalemia  Assessment & Plan  Patient was noted to have a K of 2 9 on 6/28 and K of 2 7 on 6/29  She has had a few episodes of loose bowel movements since she has been here, etiology of loose bowel movements likely related to opoid withdrawal   Patient continues to be hypokalemia in the setting of reduced PO intake with diarrhea, likely secondary to GI loss  Plan  · Follow up stool studies: ova and parasite, fecal leukocyte  · C   Dif negative, stool enteric panel negative  · Patient was given ondansetron 4 mg Q6H PRN for nausea  · Continue to monitor BMP and repleat K as needed  · Magnesium was normal       Hypertension  Assessment & Plan  Patient presented hypertensive, resolved without intervention  Blood pressure is currently stable  Plan:  · Continue amlodipine 5 mg daily and prazosin 2 mg daily  · Monitor blood pressure daily    Esophageal reflux  Assessment & Plan  Currently stable  Home medication includes omeprazole 20 mg daily  Plan:  · Substitute pantoprazole 40 mg daily, omeprazole not on formulary    Bipolar II disorder [facility])  Assessment & Plan  Patient previously followed with Psychiatry, last visit in 2018  The patient recently had a stay in a behavior health unit in May 2021 after she signed 201 after coming to the emergency department with panic attacks  According to her son, after that encounter her quetiapine was discontinued and she was given lorazepam 0 5 mg every 6 hours  Her buspirone was also discontinued  Plan:  · Continue to monitor off these medications in the setting of her acute encephalopathy  · Psychiatry consulted     Anxiety  Assessment & Plan  Patient with an extensive history of anxiety  Recently signed 201 in May 2021 for anxiety attacks  Patient's current home regimen includes nortriptyline 10 mg daily, hydroxyzine 50 mg daily at bedtime, duloxetine 60 mg daily, and lorazepam 0 5 mg every 6 hours  Patient was previously taking buspirone and quetiapine, but since her admission for 201 she has not been taking them according to worse on  Plan:  · Continue to hold all these medications in the setting of acute encephalopathy possibly due to polypharmacy  · patient has lorazepam 1 mg Q3H PRN for agitation    Chronic pain syndrome  Assessment & Plan  Patient follows with Neurosurgery for chronic lower back pain  She had a thoracolumbar fusion years ago in [place]  She just recently had a nerve stimulator placed in February 2020    Home pain regimen includes oxycodone 5 mg daily, morphine 15 mg twice daily, pregabalin 225 mg daily, duloxetine 60 mg daily  Plan:  · In the setting of encephalopathy possibly secondary to polypharmacy, will hold all these medications at this time  · If the patient's pain persists, may consider introducing other forms of analgesia or slowly introducing some of her home medications  · Patient's mental status slowly improving although she remains aphasic, will restart some pain regimen in the setting of severe chronic pain/opioid withdral  · Home Cymbalta 60 mg daily restarted for pain  ·  oxycodone 5mg oral solution Q6H scheduled  · Voltaren gel started   · Tylenol 650 mg every 6 hours as needed for pain      Disposition: Patient continues to be encephalopathic, continue current level of care    SUBJECTIVE     Patient seen and examined  Patient continues to have hypertensive episodes with agitation overnight reaching SBP of 170s  Labetalol ordered PRN, follow up to see why it was not given  On examination this morning she still had altered mental status  She was more alert than yesterday, able to give her name when asked but repeated 'yes' to all other questions and was unable to answer for place and time  She was repeating "help me" and "help my baby" and began crying at one point  Additionally, she seemed to be reaching for her left leg when she said "help me,"  Patient appears to be in pain  ROS capellan positive for pain but otherwise not possible due to mental status      OBJECTIVE     Vitals:    21 0300 21 0559 21 0715 21 1119   BP: 151/86  148/100 152/94   BP Location: Left arm      Pulse:   80 70   Resp:   22 20   Temp:   98 7 °F (37 1 °C) 98 3 °F (36 8 °C)   TempSrc:       SpO2:   99% 96%   Weight:  99 2 kg (218 lb 11 1 oz)     Height:          Temperature:   Temp (24hrs), Av 9 °F (37 2 °C), Min:98 6 °F (37 °C), Max:99 3 °F (37 4 °C)    Temperature: 98 7 °F (37 1 °C)  Intake & Output:  I/O        07 -  0700 06/29 0701 - 06/30 0700 06/30 0701 - 07/01 0700    P  O  0      I V  (mL/kg) 1233 3 (13 2) 977 1 (9 8)     IV Piggyback       Total Intake(mL/kg) 1233 3 (13 2) 977 1 (9 8)     Urine (mL/kg/hr) 1550 (0 7) 2600 (1 1)     Emesis/NG output       Stool       Total Output 1550 2600     [place] [address] 7 -2192 9                Weights:   IBW (Ideal Body Weight): 47 8 kg    Body mass index is 41 32 kg/m²  Weight (last 2 days)     Date/Time   Weight    06/30/21 0559   99 2 (218 7)    06/29/21 0600   93 1 (205 2)    06/28/21 0600   91 7 (202 1)            Physical Exam  Constitutional:       General: She is in acute distress  Appearance: She is obese  HENT:      Head: Normocephalic and atraumatic  Mouth/Throat:      Mouth: Mucous membranes are moist    Eyes:      General:         Right eye: Discharge present  Left eye: Discharge present  Extraocular Movements: Extraocular movements intact  Comments: Less discharge than 6/29  Left eye erythema appears improved  Cardiovascular:      Rate and Rhythm: Normal rate and regular rhythm  Pulses: Normal pulses  Heart sounds: Normal heart sounds  Pulmonary:      Effort: Pulmonary effort is normal       Breath sounds: Normal breath sounds  Abdominal:      General: Bowel sounds are normal       Palpations: Abdomen is soft  Musculoskeletal:      Cervical back: Neck supple  Right lower leg: No edema  Left lower leg: No edema  Skin:     General: Skin is warm and dry  Neurological:      Mental Status: She is alert  Comments: Patient seems attentive but persistently aphasic  Patient oriented x1, she was able to tell me her name but not time or place  Patient was tracking my movement and able to follow some commands  Patient was moving all four extremities equally  Full strength testing not possible due to AMS/poor effort   Psychiatric:      Comments: Unable to access psychiatric due to [facility]  [address]:  I have personally reviewed pertinent reports  Results from last 7 days   Lab Units 06/30/21  0557 06/29/21  0930 06/28/21  0609   WBC Thousand/uL 9 08 12 81* 11 10*   HEMOGLOBIN g/dL 13 7 15 4 15 5*   HEMATOCRIT % 40 2 43 7 46 0   PLATELETS Thousands/uL 302 339 290   NEUTROS PCT % 70  --  83*   MONOS PCT % 8  --  6      Results from last 7 days   Lab Units 06/30/21  0557 06/29/21  1746 06/29/21  0930 06/28/21  0609   POTASSIUM mmol/L 3 1* 3 6 2 7* 2 9*   CHLORIDE mmol/L 109*  --  109* 111*   CO2 mmol/L 20*  --  16* 21   BUN mg/dL 5  --  5 8   CREATININE mg/dL 0 48*  --  0 61 0 84   CALCIUM mg/dL 8 8  --  8 6 9 1   ALK PHOS U/L 98  --  110 113   ALT U/L 33  --  37 35   AST U/L 25  --  31 30              Results from last 7 days   Lab Units 06/27/21  1102   INR  0 94   PTT seconds 30     Results from last 7 days   Lab Units 06/29/21  1746   LACTIC ACID mmol/L 0 7     Results from last 7 days   Lab Units 06/27/21  1102   TROPONIN I ng/mL <0 02       IMAGING & DIAGNOSTIC TESTING     Radiology Results: I have personally reviewed pertinent reports  CT head wo contrast    Result Date: 6/29/2021  Impression: Grossly stable exam   No acute CT abnormality  Workstation performed: [id number]     CT stroke alert brain    Result Date: 6/27/2021  Impression: No acute intracranial abnormality  Findings were directly discussed with Dr Zainab Humphreys on 6/27/2021 11:27 AM  Workstation performed: [id number]     CTA stroke alert (head/neck)    Result Date: 6/27/2021  Impression: No significant carotid or vertebral artery stenosis  No focal intracranial stenosis or aneurysm  Findings were directly discussed with Dr Zainab Humphreys on 6/27/2021 11:27 AM  Workstation performed: [id number]     Other Diagnostic Testing: I have personally reviewed pertinent reports      ACTIVE MEDICATIONS     Current Facility-Administered Medications   Medication Dose Route Frequency    acetaminophen (TYLENOL) tablet 650 mg  650 mg Oral Q6H PRN    amLODIPine (NORVASC) tablet 5 mg  5 mg Oral Daily    ascorbic acid (VITAMIN C) tablet 500 mg  500 mg Oral BID    aspirin chewable tablet 81 mg  81 mg Oral Daily    atorvastatin (LIPITOR) tablet 40 mg  40 mg Oral Daily With Dinner    cholecalciferol (VITAMIN D3) tablet 1,000 Units  1,000 Units Oral Daily    DULoxetine (CYMBALTA) delayed release capsule 60 mg  60 mg Oral Daily    enoxaparin (LOVENOX) subcutaneous injection 40 mg  40 mg Subcutaneous U35M    folic acid (FOLVITE) tablet 1,000 mcg  1,000 mcg Oral Daily    Labetalol HCl (NORMODYNE) injection 10 mg  10 mg Intravenous Q6H PRN    lactated ringers infusion  125 mL/hr Intravenous Continuous    loperamide (IMODIUM) oral liquid 2 mg  2 mg Oral TID PRN    LORazepam (ATIVAN) injection 1 mg  1 mg Intravenous Q3H PRN    morphine injection 2 mg  2 mg Intravenous Q4H PRN    ondansetron (ZOFRAN) injection 4 mg  4 mg Intravenous Q6H PRN    pantoprazole (PROTONIX) EC tablet 40 mg  40 mg Oral Early Morning    prazosin (MINIPRESS) capsule 2 mg  2 mg Oral Daily    Valbenazine Tosylate CAPS 80 mg  80 mg Oral Daily       VTE Pharmacologic Prophylaxis: Enoxaparin (Lovenox)  VTE Mechanical Prophylaxis: sequential compression device    Portions of the record may have been created with voice recognition software  Occasional wrong word or "sound a like" substitutions may have occurred due to the inherent limitations of voice recognition software    Read the chart carefully and recognize, using context, where substitutions have occurred   ==  Adwoa Curry [facility]  [id number]

## 2021-06-30 NOTE — PLAN OF CARE
Problem: OCCUPATIONAL THERAPY ADULT  Goal: Performs self-care activities at highest level of function for planned discharge setting  See evaluation for individualized goals  Description: Treatment Interventions: ADL retraining, Functional transfer training, UE strengthening/ROM, Endurance training, Cognitive reorientation, Patient/family training, Equipment evaluation/education, Compensatory technique education, Activityengagement          See flowsheet documentation for full assessment, interventions and recommendations     Outcome: Progressing  Note: Limitation: Decreased ADL status, Decreased UE strength, Decreased Safe judgement during ADL, Decreased cognition, Decreased endurance, Decreased fine motor control, Decreased self-care trans, Decreased high-level ADLs  Prognosis: Fair  Assessment: pt seen for OT session focusing on self care tasks, cognitive stimulation and overall tolerance to activity - remains poorly responsive - mostly repeating "help" throughout session but will respond to name and inconsistently follow commands and answer simple questions inconsistently - able to hold washcloth and bring to face with physical cues to initiate however demonstrates poor carryover of tasks - requires physical cues to initiate bed mobility and sit to stand transfers - does not require a lot of physical assist to complete however demonstrates poor overall safety as she will sit unexpectedly and attempt to lie back down in the bed - continue to recommend inpt rehab when medically cleared- OT to continue to follow to address goals as stated on eval      OT Discharge Recommendation: Post acute rehabilitation services

## 2021-06-30 NOTE — PROGRESS NOTES
NEUROLOGY RESIDENCY PROGRESS NOTE     Name: Analia Adams   Age & Sex: 60 y o  female   MRN: [id number]  Unit/Bed#: [facility] 832-01   Encounter: [id number]    ASSESSMENT & PLAN     * Encephalopathy  Assessment & Plan  · Presenting with echolalia/pralalia, confusion, and possible initial RLE weakness  Last known normal around 10 PM the night before admission  Tardive dyskinesia chronic  Accompanying tachycardia, mucosal dryness, urinary retention, raising concerns for anticholinergic toxicity  Etiology toxic metabolic vs polypharmacy vs stroke   · Patient on multiple psychiatric and pain outpatient medications  Currently holding medications, including hydroxyzine 50 mg QHS, nortriptyline 10 mg QHS, trazodone 200 mg QHS, oxybutynin 5 mg BID, oxycodone 5 QD  · Toxicology consulted on admission, and physostigmine x1 administered, with partial relief in aphasia but not confusion  There was also increased secretions and diaphoresis afterwards  · CT/CTA negative, and lab workup initially revealed mild alkalosis likely secondary to hyperventilation  Tbil 1 68 and DBil 0 43, ,  Na 134  UA showed some leukocytes  Leukocytosis, bilirubin levels have been improving  · Routine EEG on 6/28 - "study is consistent with a moderate generalized non-specific encephalopathy " No evidence of electrographic seizures  · MRI incompatible spinal cord stimulator  Verified with Inpatient Radiology Department  · Repeat CT Head on 6/29: no acute intracranial abnormalities  · Plan:  · Stroke Pathway  · Pain control  · F/u labs: Lyme, TSH with T4 reflex  · Can continue/restart the following medications:  · Cymbalta 60 mg daily, Lyrica 75 mg daily, Valbenazine 80 mg daily  · F/u RUQ U/S interpretation  · Neurology service will continue to evaluate the patient    Tardive dyskinesia  Assessment & Plan  · Chronic, involving tongue and mouth  Patient has been on multiple medications, including hydroxyzine and Ingrezza  · Plan:  · Continue valbenazine 80 mg daily    Hypertension  Assessment & Plan  · /103 on admission  On Norvasc 5 mg and prazosin 2 mg daily  · Current BP is 148/100  · Plan:  · Continue BP control per primary team      SUBJECTIVE     Patient was seen and examined today at bedside  No acute events overnight  Patient is still aphasic with limited responses  When asked if anything is bothering her, she says "Help " When asked if she is in pain, she again said "Help" and then said "Yes "    Review of Systems   Unable to perform ROS: Mental status change   Musculoskeletal: Positive for back pain  OBJECTIVE     Patient ID: Fredy Sabillon is a 60 y o  female  Vitals:    21 0252 21 0300 21 0559 21 0715   BP: (!) 178/104 151/86  148/100   BP Location: Right arm Left arm     Pulse: 60   80   Resp: 18   22   Temp: 98 8 °F (37 1 °C)   98 7 °F (37 1 °C)   TempSrc:       SpO2: 100%   99%   Weight:   99 2 kg (218 lb 11 1 oz)    Height:          Temperature:   Temp (24hrs), Av 9 °F (37 2 °C), Min:98 6 °F (37 °C), Max:99 3 °F (37 4 °C)    Temperature: 98 7 °F (37 1 °C)      Physical Exam  Vitals (In restraints) and nursing note reviewed  Constitutional:       General: She is not in acute distress  Appearance: Normal appearance  She is well-developed  HENT:      Head: Normocephalic and atraumatic  Right Ear: External ear normal       Left Ear: External ear normal       Nose: Nose normal       Mouth/Throat:      Mouth: Mucous membranes are moist    Eyes:      Extraocular Movements: Extraocular movements intact and EOM normal       Conjunctiva/sclera: Conjunctivae normal       Pupils: Pupils are equal, round, and reactive to light  Cardiovascular:      Rate and Rhythm: Normal rate and regular rhythm  Heart sounds: Normal heart sounds  Pulmonary:      Effort: Pulmonary effort is normal       Breath sounds: Normal breath sounds     Abdominal:      General: Abdomen is flat       Palpations: Abdomen is soft  Musculoskeletal:         General: Normal range of motion  Cervical back: Normal range of motion  Skin:     General: Skin is warm and dry  Neurological:      General: No focal deficit present  Mental Status: She is alert  Deep Tendon Reflexes:      Reflex Scores:       Tricep reflexes are 1+ on the right side and 1+ on the left side  Patellar reflexes are 1+ on the right side and 1+ on the left side  Psychiatric:         Cognition and Memory: Cognition is impaired  Neurologic Exam     Mental Status   Disoriented to person  Disoriented to place  Disoriented to time  Level of consciousness: alert    Cranial Nerves     CN III, IV, VI   Pupils are equal, round, and reactive to light  Extraocular motions are normal    Tardive dyskinetic movements of tongue and mouth present  Motor Exam   Muscle bulk: normalUnable to properly assess secondary to pain and mental status  Patient is seen moving all limbs with minimal effort   with both hands, weaker on left hand  Sensory Exam   Unable to assess at this time  Gait, Coordination, and Reflexes     Reflexes   Right triceps: 1+  Left triceps: 1+  Right patellar: 1+  Left patellar: 1+Unable to properly assess        LABORATORY DATA     Labs:  I have personally reviewed pertinent films in PACS  Results from last 7 days   Lab Units 06/30/21  0557 06/29/21  0930 06/28/21  0609   WBC Thousand/uL 9 08 12 81* 11 10*   HEMOGLOBIN g/dL 13 7 15 4 15 5*   HEMATOCRIT % 40 2 43 7 46 0   PLATELETS Thousands/uL 302 339 290   NEUTROS PCT % 70  --  83*   MONOS PCT % 8  --  6      Results from last 7 days   Lab Units 06/30/21  0557 06/29/21  1746 06/29/21  0930 06/28/21  0609   SODIUM mmol/L 137  --  137 142   POTASSIUM mmol/L 3 1* 3 6 2 7* 2 9*   CHLORIDE mmol/L 109*  --  109* 111*   CO2 mmol/L 20*  --  16* 21   BUN mg/dL 5  --  5 8   CREATININE mg/dL 0 48*  --  0 61 0 84   CALCIUM mg/dL 8 8  -- 8 6 9  1   ALK PHOS U/L 98  --  110 113   ALT U/L 33  --  37 35   AST U/L 25  --  31 30              Results from last 7 days   Lab Units 06/27/21  1102   INR  0 94   PTT seconds 30     Results from last 7 days   Lab Units 06/29/21  1746   LACTIC ACID mmol/L 0 7     Results from last 7 days   Lab Units 06/27/21  1102   TROPONIN I ng/mL <0 02       IMAGING & DIAGNOSTIC TESTING     Radiology Results: I have personally reviewed pertinent films in PACS    CT head wo contrast   Final Result by Hayley Martin MD (06/29 1406)      Grossly stable exam   No acute CT abnormality  Workstation performed: [id number]         CTA stroke alert (head/neck)   Final Result by Tiffany Gracia DO (06/27 1144)      No significant carotid or vertebral artery stenosis  No focal intracranial stenosis or aneurysm  Findings were directly discussed with Dr Sim Saucedo on 6/27/2021 11:27 AM                      Workstation performed: [id number]         CT stroke alert brain   Final Result by Tiffany Gracia DO (06/27 1145)      No acute intracranial abnormality           Findings were directly discussed with Dr Sim Saucedo on 6/27/2021 11:27 AM          Workstation performed: [id number]         XR chest portable    (Results Pending)   US right upper quadrant    (Results Pending)       Other Diagnostic Testing: I have personally reviewed pertinent films in PACS    ACTIVE MEDICATIONS     Current Facility-Administered Medications   Medication Dose Route Frequency    acetaminophen (TYLENOL) tablet 650 mg  650 mg Oral Q6H PRN    amLODIPine (NORVASC) tablet 5 mg  5 mg Oral Daily    ascorbic acid (VITAMIN C) tablet 500 mg  500 mg Oral BID    aspirin chewable tablet 81 mg  81 mg Oral Daily    atorvastatin (LIPITOR) tablet 40 mg  40 mg Oral Daily With Dinner    cholecalciferol (VITAMIN D3) tablet 1,000 Units  1,000 Units Oral Daily    Diclofenac Sodium (VOLTAREN) 1 % topical gel 2 g  2 g Topical 4x Daily    DULoxetine (CYMBALTA) delayed release capsule 60 mg  60 mg Oral Daily    enoxaparin (LOVENOX) subcutaneous injection 40 mg  40 mg Subcutaneous X02T    folic acid (FOLVITE) tablet 1,000 mcg  1,000 mcg Oral Daily    Labetalol HCl (NORMODYNE) injection 10 mg  10 mg Intravenous Q6H PRN    lactated ringers infusion  125 mL/hr Intravenous Continuous    loperamide (IMODIUM) oral liquid 2 mg  2 mg Oral TID PRN    LORazepam (ATIVAN) injection 1 mg  1 mg Intravenous Q3H PRN    morphine injection 2 mg  2 mg Intravenous Q4H PRN    omeprazole (PRILOSEC) suspension 2 mg/mL  20 mg Oral Daily    ondansetron (ZOFRAN) injection 4 mg  4 mg Intravenous Q6H PRN    potassium chloride oral solution 40 mEq  40 mEq Oral BID    prazosin (MINIPRESS) capsule 2 mg  2 mg Oral Daily    Valbenazine Tosylate CAPS 80 mg  80 mg Oral Daily       Prior to Admission medications    Medication Sig Start Date End Date Taking?  Authorizing Provider   acetaminophen (TYLENOL) 325 mg tablet Take 2 tablets (650 mg total) by mouth every 8 (eight) hours as needed for mild pain 3/9/21  Yes Lesly Saha,    amLODIPine (NORVASC) 5 mg tablet Take 1 tablet (5 mg total) by mouth daily 6/5/21 9/3/21 Yes Jayce Torres MD   [facility] Vitamin C 500 MG tablet TAKE 1 TABLET BY MOUTH TWICE A DAY 4/14/21  Yes Jayce Torres MD   DULoxetine (CYMBALTA) 60 mg delayed release capsule TAKE 1 CAPSULE BY MOUTH EVERY DAY 4/14/21  Yes Jayce Torres MD   ferrous sulfate 324 (65 Fe) mg TAKE 1 TABLET (324 MG TOTAL) BY MOUTH 2 (TWO) TIMES A DAY BEFORE MEALS 4/13/21  Yes Jayce Torres MD   folic acid (FOLVITE) 1 mg tablet TAKE 1 TABLET BY MOUTH EVERY DAY 4/14/21  Yes Jayce Torres MD   hydrOXYzine HCL (ATARAX) 50 mg tablet TAKE 1 TABLET (50 MG TOTAL) BY MOUTH DAILY AT BEDTIME AS NEEDED FOR ANXIETY (INSOMNIA) 6/3/21  Yes Jayce Torres MD   lidocaine (LMX) 4 % cream Apply topically as needed for mild pain 7/16/20  Yes Daniela Salcedo,    LORazepam (ATIVAN) 0 5 mg tablet Take by mouth every 6 (six) hours 6/3/21  Yes Historical Provider, MD   morphine (MS CONTIN) 15 mg 12 hr tablet Take 1 tablet (15 mg total) by mouth 2 (two) times a day Hold if sedatedMax Daily Amount: 30 mg 6/25/21  Yes Debby Wong MD   Mouthwashes (Biotene Dry Mouth) LIQD Apply 5 mL to the mouth or throat daily   Yes Historical Provider, MD   nortriptyline (PAMELOR) 10 mg capsule Take 1 capsule (10 mg total) by mouth daily at bedtime 4/16/21  Yes Cory Aldana DO   orlistat (AYDEN) 60 MG capsule Take 60 mg by mouth 3 (three) times a day with meals   Yes Historical Provider, MD   oxyCODONE (ROXICODONE) 5 mg immediate release tablet Take 1 tablet (5 mg total) by mouth dailyMax Daily Amount: 5 mg 6/25/21  Yes Debby Wong MD   Polyethylene Glycol 3350 (MIRALAX PO) Take by mouth   Yes Historical Provider, MD   prazosin (MINIPRESS) 2 mg capsule TAKE 1 CAPSULE BY MOUTH EVERY DAY 7/9/20  Yes Ernst Sims MD   pregabalin (LYRICA) 225 MG capsule TAKE 1 CAPSULE (225 MG TOTAL) BY MOUTH EVERY 12 (TWELVE) HOURS 4/29/21  Yes Cherie Cantor DO   traZODone (DESYREL) 100 mg tablet Take 100 mg by mouth daily at bedtime 2 tablets daily at bedtime   Yes Historical Provider, MD   Valbenazine Tosylate (Ingrezza) 80 MG CAPS Take 80 mg by mouth daily 3/17/21  Yes Evelyn Johnson MD   bisacodyl (DULCOLAX) 5 mg EC tablet Take 5 mg by mouth daily as needed for constipation  Patient not taking: Reported on 6/27/2021    Historical Provider, MD   busPIRone (BUSPAR) 15 mg tablet Take 1 tablet (15 mg total) by mouth 3 (three) times a day  Patient not taking: Reported on 6/23/2021 3/17/21 6/23/21  Evelyn Johnson MD   cetirizine (ZyrTEC) 10 mg tablet Take 10 mg by mouth as needed   Patient not taking: Reported on 6/27/2021 11/19/20   Historical Provider, MD   cholecalciferol (VITAMIN D3) 1,000 units tablet Take 1 tablet (1,000 Units total) by mouth daily 8/4/20 6/23/21  Evelyn Johnson MD   Diapers & Supplies (Huggies Pull-Ups) MISC Use 3 (three) times a day  Patient not taking: Reported on 6/27/2021 4/20/21   Martin Monroy DO   Diclofenac Sodium (VOLTAREN) 1 % Apply 2 g topically 4 (four) times a day 6/28/21   Ulysses Pepper, MD   ipratropium-albuterol (DUO-NEB) 0 5-2 5 mg/3 mL nebulizer solution Take 1 vial (3 mL total) by nebulization every 6 (six) hours as needed for wheezing or shortness of breath  Patient not taking: Reported on 6/10/2021 3/29/21   Alesia Berrios DO   Multiple Vitamins-Minerals (multivitamin with minerals) tablet Take 1 tablet by mouth daily  Patient not taking: Reported on 6/27/2021 8/18/20   Phil Haywood MD   naloxone [facility]) 4 mg/0 1 mL nasal spray Administer 1 spray into a nostril  If no response after 2-3 minutes, give another dose in the other nostril using a new spray    Patient not taking: Reported on 6/10/2021 3/29/21   Brittni Cai DO   omeprazole (PriLOSEC) 20 mg delayed release capsule Take 1 capsule (20 mg total) by mouth daily 3/17/21 6/15/21  Ashley Garcia MD   ondansetron (ZOFRAN-ODT) 4 mg disintegrating tablet Take 1 tablet (4 mg total) by mouth every 6 (six) hours as needed for nausea or vomiting  Patient not taking: Reported on 6/27/2021 9/17/20   Ernestina Aldana DO   oxybutynin (DITROPAN) 5 mg tablet Take 1 tablet (5 mg total) by mouth 2 (two) times a day 3/17/21 6/15/21  Ashley Garcia MD   potassium chloride (MICRO-K) 10 MEQ CR capsule Take 10 mEq by mouth 2 (two) times a day Two tabs, two times daily  Patient not taking: Reported on 6/27/2021    Historical Provider, MD   QUEtiapine (SEROquel) 300 mg tablet Take 300 mg by mouth daily at bedtime  Patient not taking: Reported on 6/27/2021    Historical Provider, MD   Respiratory Therapy Supplies (Nebulizer) UYDY Use as needed (Shortness of breath)  Patient not taking: Reported on 4/20/2021 3/29/21   Alesia Berrios DO   Sennosides (SENEXON PO) Take by mouth  Patient not taking: Reported on 6/27/2021    Historical Provider, MD MEHTAICONE PO Take by mouth as needed   Patient not taking: Reported on 6/27/2021    Historical Provider, MD   sodium chloride (OCEAN) 0 65 % nasal spray 2 sprays into each nostril as needed  Patient not taking: Reported on 6/27/2021    Historical Provider, MD   triamcinolone (KENALOG) 0 025 % cream Apply topically 2 (two) times a day  Patient not taking: Reported on 6/27/2021 7/16/20   Juan Diego Found, DO     VTE Pharmacologic Prophylaxis: Enoxaparin (Lovenox)  VTE Mechanical Prophylaxis: sequential compression device    ==  Michelr MD Jerardo aDvis [place] Psych[facility]ry Resident, PGY-1

## 2021-07-01 ENCOUNTER — APPOINTMENT (INPATIENT)
Dept: NON INVASIVE DIAGNOSTICS | Facility: HOSPITAL | Age: 56
DRG: 092 | End: 2021-07-01
Payer: COMMERCIAL

## 2021-07-01 LAB
ALBUMIN SERPL BCP-MCNC: 3.4 G/DL (ref 3.5–5)
ALP SERPL-CCNC: 99 U/L (ref 46–116)
ALT SERPL W P-5'-P-CCNC: 31 U/L (ref 12–78)
ANION GAP SERPL CALCULATED.3IONS-SCNC: 13 MMOL/L (ref 4–13)
ANION GAP SERPL CALCULATED.3IONS-SCNC: 14 MMOL/L (ref 4–13)
AST SERPL W P-5'-P-CCNC: 18 U/L (ref 5–45)
B BURGDOR IGG+IGM SER-ACNC: 58
BACTERIA UR QL AUTO: ABNORMAL /HPF
BASE EX.OXY STD BLDV CALC-SCNC: 64.7 % (ref 60–80)
BASE EXCESS BLDV CALC-SCNC: -3.6 MMOL/L
BASOPHILS # BLD AUTO: 0.07 THOUSANDS/ΜL (ref 0–0.1)
BASOPHILS NFR BLD AUTO: 1 % (ref 0–1)
BILIRUB DIRECT SERPL-MCNC: 0.47 MG/DL (ref 0–0.2)
BILIRUB SERPL-MCNC: 1.7 MG/DL (ref 0.2–1)
BILIRUB UR QL STRIP: NEGATIVE
BUN SERPL-MCNC: 5 MG/DL (ref 5–25)
BUN SERPL-MCNC: 6 MG/DL (ref 5–25)
CALCIUM SERPL-MCNC: 8.7 MG/DL (ref 8.3–10.1)
CALCIUM SERPL-MCNC: 9.1 MG/DL (ref 8.3–10.1)
CHLORIDE SERPL-SCNC: 103 MMOL/L (ref 100–108)
CHLORIDE SERPL-SCNC: 104 MMOL/L (ref 100–108)
CK MB SERPL-MCNC: 1.4 % (ref 0–2.5)
CK MB SERPL-MCNC: 4.8 NG/ML (ref 0–5)
CK SERPL-CCNC: 338 U/L (ref 26–192)
CLARITY UR: ABNORMAL
CO2 SERPL-SCNC: 17 MMOL/L (ref 21–32)
CO2 SERPL-SCNC: 20 MMOL/L (ref 21–32)
COLOR UR: YELLOW
CREAT SERPL-MCNC: 0.49 MG/DL (ref 0.6–1.3)
CREAT SERPL-MCNC: 0.67 MG/DL (ref 0.6–1.3)
EOSINOPHIL # BLD AUTO: 0.04 THOUSAND/ΜL (ref 0–0.61)
EOSINOPHIL NFR BLD AUTO: 1 % (ref 0–6)
ERYTHROCYTE [DISTWIDTH] IN BLOOD BY AUTOMATED COUNT: 13.4 % (ref 11.6–15.1)
GFR SERPL CREATININE-BSD FRML MDRD: 113 ML/MIN/1.73SQ M
GFR SERPL CREATININE-BSD FRML MDRD: 125 ML/MIN/1.73SQ M
GLUCOSE SERPL-MCNC: 108 MG/DL (ref 65–140)
GLUCOSE SERPL-MCNC: 98 MG/DL (ref 65–140)
GLUCOSE UR STRIP-MCNC: NEGATIVE MG/DL
HCO3 BLDV-SCNC: 20.1 MMOL/L (ref 24–30)
HCT VFR BLD AUTO: 41.4 % (ref 34.8–46.1)
HGB BLD-MCNC: 14.1 G/DL (ref 11.5–15.4)
HGB UR QL STRIP.AUTO: ABNORMAL
HYALINE CASTS #/AREA URNS LPF: ABNORMAL /LPF
IMM GRANULOCYTES # BLD AUTO: 0.04 THOUSAND/UL (ref 0–0.2)
IMM GRANULOCYTES NFR BLD AUTO: 1 % (ref 0–2)
KETONES UR STRIP-MCNC: ABNORMAL MG/DL
LACTATE SERPL-SCNC: 1.6 MMOL/L (ref 0.5–2)
LACTATE SERPL-SCNC: 4.3 MMOL/L (ref 0.5–2)
LEUKOCYTE ESTERASE UR QL STRIP: ABNORMAL
LYMPHOCYTES # BLD AUTO: 1.09 THOUSANDS/ΜL (ref 0.6–4.47)
LYMPHOCYTES NFR BLD AUTO: 13 % (ref 14–44)
MAGNESIUM SERPL-MCNC: 1.5 MG/DL (ref 1.6–2.6)
MCH RBC QN AUTO: 28.9 PG (ref 26.8–34.3)
MCHC RBC AUTO-ENTMCNC: 34.1 G/DL (ref 31.4–37.4)
MCV RBC AUTO: 85 FL (ref 82–98)
MONOCYTES # BLD AUTO: 0.82 THOUSAND/ΜL (ref 0.17–1.22)
MONOCYTES NFR BLD AUTO: 10 % (ref 4–12)
NEUTROPHILS # BLD AUTO: 6.48 THOUSANDS/ΜL (ref 1.85–7.62)
NEUTS SEG NFR BLD AUTO: 74 % (ref 43–75)
NITRITE UR QL STRIP: NEGATIVE
NON-SQ EPI CELLS URNS QL MICRO: ABNORMAL /HPF
NRBC BLD AUTO-RTO: 0 /100 WBCS
O+P STL CONC: NORMAL
O2 CT BLDV-SCNC: 13 ML/DL
PCO2 BLDV: 32.5 MM HG (ref 42–50)
PH BLDV: 7.41 [PH] (ref 7.3–7.4)
PH UR STRIP.AUTO: 6 [PH]
PHOSPHATE SERPL-MCNC: 2 MG/DL (ref 2.7–4.5)
PLATELET # BLD AUTO: 335 THOUSANDS/UL (ref 149–390)
PMV BLD AUTO: 9.7 FL (ref 8.9–12.7)
PO2 BLDV: 33.6 MM HG (ref 35–45)
POTASSIUM SERPL-SCNC: 2.9 MMOL/L (ref 3.5–5.3)
POTASSIUM SERPL-SCNC: 3.3 MMOL/L (ref 3.5–5.3)
PROCALCITONIN SERPL-MCNC: <0.05 NG/ML
PROT SERPL-MCNC: 7.2 G/DL (ref 6.4–8.2)
PROT UR STRIP-MCNC: NEGATIVE MG/DL
RBC # BLD AUTO: 4.88 MILLION/UL (ref 3.81–5.12)
RBC #/AREA URNS AUTO: ABNORMAL /HPF
SODIUM SERPL-SCNC: 134 MMOL/L (ref 136–145)
SODIUM SERPL-SCNC: 137 MMOL/L (ref 136–145)
SP GR UR STRIP.AUTO: 1.01 (ref 1–1.03)
UROBILINOGEN UR QL STRIP.AUTO: 1 E.U./DL
WBC # BLD AUTO: 8.54 THOUSAND/UL (ref 4.31–10.16)
WBC #/AREA URNS AUTO: ABNORMAL /HPF
WBC SPEC QL GRAM STN: NORMAL

## 2021-07-01 PROCEDURE — 84100 ASSAY OF PHOSPHORUS: CPT | Performed by: STUDENT IN AN ORGANIZED HEALTH CARE EDUCATION/TRAINING PROGRAM

## 2021-07-01 PROCEDURE — 84145 PROCALCITONIN (PCT): CPT | Performed by: STUDENT IN AN ORGANIZED HEALTH CARE EDUCATION/TRAINING PROGRAM

## 2021-07-01 PROCEDURE — C8929 TTE W OR WO FOL WCON,DOPPLER: HCPCS

## 2021-07-01 PROCEDURE — 80048 BASIC METABOLIC PNL TOTAL CA: CPT | Performed by: STUDENT IN AN ORGANIZED HEALTH CARE EDUCATION/TRAINING PROGRAM

## 2021-07-01 PROCEDURE — 99233 SBSQ HOSP IP/OBS HIGH 50: CPT | Performed by: INTERNAL MEDICINE

## 2021-07-01 PROCEDURE — 82553 CREATINE MB FRACTION: CPT | Performed by: PSYCHIATRY & NEUROLOGY

## 2021-07-01 PROCEDURE — 82805 BLOOD GASES W/O2 SATURATION: CPT | Performed by: STUDENT IN AN ORGANIZED HEALTH CARE EDUCATION/TRAINING PROGRAM

## 2021-07-01 PROCEDURE — 83735 ASSAY OF MAGNESIUM: CPT | Performed by: STUDENT IN AN ORGANIZED HEALTH CARE EDUCATION/TRAINING PROGRAM

## 2021-07-01 PROCEDURE — 93005 ELECTROCARDIOGRAM TRACING: CPT

## 2021-07-01 PROCEDURE — 93306 TTE W/DOPPLER COMPLETE: CPT | Performed by: INTERNAL MEDICINE

## 2021-07-01 PROCEDURE — 87040 BLOOD CULTURE FOR BACTERIA: CPT | Performed by: STUDENT IN AN ORGANIZED HEALTH CARE EDUCATION/TRAINING PROGRAM

## 2021-07-01 PROCEDURE — 85025 COMPLETE CBC W/AUTO DIFF WBC: CPT | Performed by: STUDENT IN AN ORGANIZED HEALTH CARE EDUCATION/TRAINING PROGRAM

## 2021-07-01 PROCEDURE — 99233 SBSQ HOSP IP/OBS HIGH 50: CPT | Performed by: PSYCHIATRY & NEUROLOGY

## 2021-07-01 PROCEDURE — 80076 HEPATIC FUNCTION PANEL: CPT | Performed by: STUDENT IN AN ORGANIZED HEALTH CARE EDUCATION/TRAINING PROGRAM

## 2021-07-01 PROCEDURE — 83605 ASSAY OF LACTIC ACID: CPT | Performed by: STUDENT IN AN ORGANIZED HEALTH CARE EDUCATION/TRAINING PROGRAM

## 2021-07-01 PROCEDURE — 81001 URINALYSIS AUTO W/SCOPE: CPT | Performed by: STUDENT IN AN ORGANIZED HEALTH CARE EDUCATION/TRAINING PROGRAM

## 2021-07-01 PROCEDURE — 82550 ASSAY OF CK (CPK): CPT | Performed by: PSYCHIATRY & NEUROLOGY

## 2021-07-01 RX ORDER — POTASSIUM CHLORIDE 14.9 MG/ML
20 INJECTION INTRAVENOUS
Status: COMPLETED | OUTPATIENT
Start: 2021-07-01 | End: 2021-07-02

## 2021-07-01 RX ORDER — POTASSIUM CHLORIDE 14.9 MG/ML
20 INJECTION INTRAVENOUS 3 TIMES DAILY
Status: DISCONTINUED | OUTPATIENT
Start: 2021-07-01 | End: 2021-07-01

## 2021-07-01 RX ORDER — MAGNESIUM SULFATE HEPTAHYDRATE 40 MG/ML
2 INJECTION, SOLUTION INTRAVENOUS ONCE
Status: COMPLETED | OUTPATIENT
Start: 2021-07-01 | End: 2021-07-01

## 2021-07-01 RX ORDER — POTASSIUM CHLORIDE 14.9 MG/ML
20 INJECTION INTRAVENOUS ONCE
Status: COMPLETED | OUTPATIENT
Start: 2021-07-01 | End: 2021-07-02

## 2021-07-01 RX ORDER — PREGABALIN 100 MG/1
100 CAPSULE ORAL DAILY
Status: DISCONTINUED | OUTPATIENT
Start: 2021-07-01 | End: 2021-07-05

## 2021-07-01 RX ORDER — QUETIAPINE FUMARATE 25 MG/1
25 TABLET, FILM COATED ORAL
Status: DISCONTINUED | OUTPATIENT
Start: 2021-07-01 | End: 2021-07-05

## 2021-07-01 RX ORDER — SODIUM CHLORIDE, SODIUM GLUCONATE, SODIUM ACETATE, POTASSIUM CHLORIDE, MAGNESIUM CHLORIDE, SODIUM PHOSPHATE, DIBASIC, AND POTASSIUM PHOSPHATE .53; .5; .37; .037; .03; .012; .00082 G/100ML; G/100ML; G/100ML; G/100ML; G/100ML; G/100ML; G/100ML
500 INJECTION, SOLUTION INTRAVENOUS ONCE
Status: COMPLETED | OUTPATIENT
Start: 2021-07-01 | End: 2021-07-01

## 2021-07-01 RX ORDER — POTASSIUM CHLORIDE 20 MEQ/1
40 TABLET, EXTENDED RELEASE ORAL ONCE
Status: DISCONTINUED | OUTPATIENT
Start: 2021-07-01 | End: 2021-07-01

## 2021-07-01 RX ADMIN — POTASSIUM CHLORIDE 20 MEQ: 14.9 INJECTION, SOLUTION INTRAVENOUS at 11:20

## 2021-07-01 RX ADMIN — PREGABALIN 100 MG: 100 CAPSULE ORAL at 09:57

## 2021-07-01 RX ADMIN — SODIUM CHLORIDE, SODIUM LACTATE, POTASSIUM CHLORIDE, AND CALCIUM CHLORIDE 125 ML/HR: .6; .31; .03; .02 INJECTION, SOLUTION INTRAVENOUS at 07:11

## 2021-07-01 RX ADMIN — OXYCODONE HYDROCHLORIDE 5 MG: 5 SOLUTION ORAL at 05:44

## 2021-07-01 RX ADMIN — DULOXETINE HYDROCHLORIDE 60 MG: 60 CAPSULE, DELAYED RELEASE ORAL at 09:28

## 2021-07-01 RX ADMIN — OXYCODONE HYDROCHLORIDE 5 MG: 5 SOLUTION ORAL at 11:25

## 2021-07-01 RX ADMIN — POTASSIUM CHLORIDE 40 MEQ: 1500 TABLET, EXTENDED RELEASE ORAL at 09:28

## 2021-07-01 RX ADMIN — AMLODIPINE BESYLATE 5 MG: 5 TABLET ORAL at 09:28

## 2021-07-01 RX ADMIN — OXYCODONE HYDROCHLORIDE 5 MG: 5 SOLUTION ORAL at 17:20

## 2021-07-01 RX ADMIN — DICLOFENAC SODIUM 2 G: 10 GEL TOPICAL at 11:29

## 2021-07-01 RX ADMIN — SODIUM CHLORIDE, SODIUM LACTATE, POTASSIUM CHLORIDE, AND CALCIUM CHLORIDE 125 ML/HR: .6; .31; .03; .02 INJECTION, SOLUTION INTRAVENOUS at 15:13

## 2021-07-01 RX ADMIN — LORAZEPAM 1 MG: 2 INJECTION INTRAMUSCULAR; INTRAVENOUS at 18:10

## 2021-07-01 RX ADMIN — ONDANSETRON 4 MG: 2 INJECTION INTRAMUSCULAR; INTRAVENOUS at 10:00

## 2021-07-01 RX ADMIN — Medication 1000 UNITS: at 09:28

## 2021-07-01 RX ADMIN — DICLOFENAC SODIUM 2 G: 10 GEL TOPICAL at 21:45

## 2021-07-01 RX ADMIN — PRAZOSIN HYDROCHLORIDE 2 MG: 2 CAPSULE ORAL at 09:29

## 2021-07-01 RX ADMIN — ENOXAPARIN SODIUM 40 MG: 40 INJECTION SUBCUTANEOUS at 20:01

## 2021-07-01 RX ADMIN — DICLOFENAC SODIUM 2 G: 10 GEL TOPICAL at 09:23

## 2021-07-01 RX ADMIN — POTASSIUM PHOSPHATE, MONOBASIC AND POTASSIUM PHOSPHATE, DIBASIC 15 MMOL: 224; 236 INJECTION, SOLUTION, CONCENTRATE INTRAVENOUS at 12:58

## 2021-07-01 RX ADMIN — POTASSIUM CHLORIDE 20 MEQ: 14.9 INJECTION, SOLUTION INTRAVENOUS at 13:00

## 2021-07-01 RX ADMIN — POTASSIUM CHLORIDE 20 MEQ: 14.9 INJECTION, SOLUTION INTRAVENOUS at 15:12

## 2021-07-01 RX ADMIN — ATORVASTATIN CALCIUM 40 MG: 40 TABLET, FILM COATED ORAL at 17:03

## 2021-07-01 RX ADMIN — DICLOFENAC SODIUM 2 G: 10 GEL TOPICAL at 17:03

## 2021-07-01 RX ADMIN — QUETIAPINE FUMARATE 25 MG: 25 TABLET ORAL at 21:44

## 2021-07-01 RX ADMIN — POTASSIUM CHLORIDE 20 MEQ: 14.9 INJECTION, SOLUTION INTRAVENOUS at 21:44

## 2021-07-01 RX ADMIN — PERFLUTREN 1.2 ML/MIN: 6.52 INJECTION, SUSPENSION INTRAVENOUS at 12:02

## 2021-07-01 RX ADMIN — ASPIRIN 81 MG CHEWABLE TABLET 81 MG: 81 TABLET CHEWABLE at 09:28

## 2021-07-01 RX ADMIN — OXYCODONE HYDROCHLORIDE AND ACETAMINOPHEN 500 MG: 500 TABLET ORAL at 09:28

## 2021-07-01 RX ADMIN — FOLIC ACID 1000 MCG: 1 TABLET ORAL at 09:28

## 2021-07-01 RX ADMIN — MAGNESIUM SULFATE HEPTAHYDRATE 2 G: 40 INJECTION, SOLUTION INTRAVENOUS at 10:03

## 2021-07-01 RX ADMIN — CEFTRIAXONE SODIUM 1000 MG: 10 INJECTION, POWDER, FOR SOLUTION INTRAVENOUS at 21:44

## 2021-07-01 RX ADMIN — Medication 20 MG: at 09:28

## 2021-07-01 RX ADMIN — OXYCODONE HYDROCHLORIDE 5 MG: 5 SOLUTION ORAL at 00:14

## 2021-07-01 RX ADMIN — LORAZEPAM 1 MG: 2 INJECTION INTRAMUSCULAR; INTRAVENOUS at 11:02

## 2021-07-01 RX ADMIN — SODIUM CHLORIDE, SODIUM GLUCONATE, SODIUM ACETATE, POTASSIUM CHLORIDE, MAGNESIUM CHLORIDE, SODIUM PHOSPHATE, DIBASIC, AND POTASSIUM PHOSPHATE 500 ML: .53; .5; .37; .037; .03; .012; .00082 INJECTION, SOLUTION INTRAVENOUS at 12:51

## 2021-07-01 NOTE — ASSESSMENT & PLAN NOTE
Chronic use of opioids for her pain  Most medications have been stopped  Oxycodone restarted    Continue to monitor for signs of withdrawal

## 2021-07-01 NOTE — PROGRESS NOTES
Pastoral Care Progress Note    2021  Patient: Elyssa Frost : 1965  Admission Date & Time: 2021 1056  MRN: [id number] CSN: [id number]     checked-in with Nurse regarding support from [facility]  Team for Pt  Pastoral Care will continue to follow as able to provide support       21 1200   Clinical Encounter Type   Visited With Patient not available;Health care provider  (Pt asleep)

## 2021-07-01 NOTE — PROGRESS NOTES
INTERNAL MEDICINE RESIDENCY PROGRESS NOTE     Name: Ossie Peabody   Age & Sex: 60 y o  female   MRN: [id number]  Unit/Bed#: [facility] 832-01   Encounter: [id number]  Team: SOD Team A    PATIENT INFORMATION     Name: Ossie Peabody   Age & Sex: 60 y o  female   MRN: [id number]  Hospital Stay Days: 4    ASSESSMENT/PLAN     Principal Problem:    Encephalopathy  Active Problems:    Chronic pain syndrome    Anxiety    Bipolar II disorder ([facility] )    Esophageal reflux    Hypertension    Hypokalemia    Opioid dependence ([facility] )    Tardive dyskinesia    Altered mental status      * Encephalopathy  Assessment & Plan  Patient presented altered, initial concern for polypharmacy, all home medications were stopped on admission, some pain medications restarted on 6/30 due to diminishing concern for polypharmacy as sole cause of encephalopathy  Per her son, the patient woke up on the morning of 6/27 with altered mental status  She was incoherent and could not be redirected  Neurology consult in the emergency department for stroke rule out  CT of the head and CT angiogram of the head and neck negative for any intracranial abnormality  Doubt CVA at this time  Possible anticholinergic toxicity  Patient did not respond to physostigmine as anticipated  Doubt infectious causes with no leukocytosis, negative UA, and no fever  Possible eitiology: toxic metabolic vs  Acute stroke not seen on imaging vs  Infectious vs polypharmacy vs inflammatory      Plan:  · Medication reconciliation done with the patient's son with medications in hand  · Will discontinue a majority of medications including: hydroxyzine 50 mg QHS, nortriptyline 10 mg QHS, trazodone 200 mg QHS, oxybutynin 5 mg BID, pregabalin 225 mg QD, duloxetine 60 mg QD, morphine 15 mg BID, oxycodone 5 QD, Ingrezza 80 mg daily  · According to son, the patient is no longer taking:  Cetirizine 10 mg QD, buspirone 15 mg TID, quetiapine 300 mg QHS (has replaced this with lorazepam 0 5 mg Q6H)  · MRI of brain incompatible with spinal stimulator, will follow up with CT head was negative  · Follow up echo for stroke pathway  · B12 and RPR, HIV, and lyme negative  · Blood cultures negative at 24 hours, hepatic function panel positive for T bill 1 6  · RUQ US showed possible 6mm hemangioma, no other abnormality noted  · Follow up lactic acid  · EEG on 6/28: was consistent with moderate generalized non-specific encephalopathy, no electrographic seizures  · Toxicology, neurology and psychiatry consulted, appreciate recommendations  · Urinary retention protocol in place  · Lorazepam IV 1 mg Q3H PRN for agitation  · Home Cymbalta 60 mg daily restarted  · Lyrica 100 mg daily started for pain control  · Neurochecks every 4 hours  · Regulate sleep-wake cycle  · Monitor for improvement off medications  · Patient has been evaluated by speech for dysphagia, Level I dysphagia diet recommended and ordered        Hypokalemia  Assessment & Plan  Patient noted to have persistent hypokalemia since admission  She has had a few episodes of loose bowel movements since she has been here, etiology of loose bowel movements likely related to opoid withdrawal   Patient continues to be hypokalemia in the setting of reduced PO intake with diarrhea, likely secondary to GI loss  Plan  · Follow up stool studies: ova and parasite, fecal leukocyte  · C  Dif negative, stool enteric panel negative  · Patient was given ondansetron 4 mg Q6H PRN for nausea  · Recheck K was 2 9 and Mg and phos were 1 5 and 2 0 respectively, repleted  · Continue to monitor BMP, Mg and phos, replete as needed        Chronic pain syndrome  Assessment & Plan  Patient follows with Neurosurgery for chronic lower back pain  She had a thoracolumbar fusion years ago in [place]  She just recently had a nerve stimulator placed in February 2020    Home pain regimen includes oxycodone 5 mg daily, morphine 15 mg twice daily, pregabalin 225 mg daily, duloxetine 60 mg daily  Plan:  · In the setting of encephalopathy possibly secondary to polypharmacy, will hold all these medications at this time  · If the patient's pain persists, may consider introducing other forms of analgesia or slowly introducing some of her home medications  · Patient's mental status slowly improving although she remains aphasic, will restart some pain regimen in the setting of severe chronic pain/opioid withdral  · Home Cymbalta 60 mg daily restarted for pain  · lyrica 100 mg daily started for pain control  · 6/30 oxycodone 5mg oral solution Q6H scheduled  · Voltaren gel started   · Tylenol 650 mg every 6 hours as needed for pain    Tardive dyskinesia  Assessment & Plan  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  Patient taking Ingrezza 80 mg QD  Plan:  · Hold medication in the setting of acute encephalopathy    Opioid dependence ([facility] )  Assessment & Plan  Patient with a long history of chronic pain and opioid abuse  Current regimen includes oxycodone 5 mg daily and morphine 15 mg twice daily  UDS positive for opioids  Plan:  · Medications held in setting of acute encephalopathy, patient's mental status is improving and she is in significant pain, will add back some pain coverage  · Patient restarted on Oxycodone 5 mg suspension Q6H for pain control/opioid withdrawl    Hypertension  Assessment & Plan  Patient presented hypertensive, resolved without intervention  Blood pressure is currently stable  Plan:  · Continue amlodipine 5 mg daily and prazosin 2 mg daily  · Labetalol 10 mg IM PRN for SBP >160  · Monitor blood pressure daily    Esophageal reflux  Assessment & Plan  Currently stable  Home medication includes omeprazole 20 mg daily  Plan:  · Substitute pantoprazole 40 mg daily, omeprazole not on formulary    Bipolar II disorder [facility])  Assessment & Plan  Patient previously followed with Psychiatry, last visit in 2018   The patient recently had a stay in a behavior health unit in May 2021 after she signed 201 after coming to the emergency department with panic attacks  According to her son, after that encounter her quetiapine was discontinued and she was given lorazepam 0 5 mg every 6 hours  Her buspirone was also discontinued  Plan:  · Continue to monitor off these medications in the setting of her acute encephalopathy  · Psychiatry consulted, currently unable to assess due to [address]  Patient with an extensive history of anxiety  Recently signed 201 in May 2021 for anxiety attacks  Patient's current home regimen includes nortriptyline 10 mg daily, hydroxyzine 50 mg daily at bedtime, duloxetine 60 mg daily, and lorazepam 0 5 mg every 6 hours  Patient was previously taking buspirone and quetiapine, but since her admission for 201 she has not been taking them according to worse on  Plan:  · Restart pain medications slowly in setting of AMS  · patient has lorazepam 1 mg Q3H PRN for agitation      Disposition: Patient is being evaluated for AMS, continue current level of care     SUBJECTIVE     Patient seen and examined  No acute events overnight  She was repeatedly saying "help me" and "help" when I went in to interview  She would occasionally say "yes" to questions about pain, but could not state anything else including her name, where she is, and location of pain  She kept her eyes closed and would not follow commands  ROS not possible due to AMS      OBJECTIVE     Vitals:    21 1906 21 1908 21 2116 21 0600   BP: 165/100 (!) 160/102 160/98    BP Location:       Pulse:       Resp: 18  16    Temp: 98 9 °F (37 2 °C) 98 9 °F (37 2 °C) 98 °F (36 7 °C)    TempSrc:       SpO2:       Weight:    101 kg (223 lb 12 3 oz)   Height:          Temperature:   Temp (24hrs), Av 4 °F (36 9 °C), Min:98 °F (36 7 °C), Max:98 9 °F (37 2 °C)    Temperature: 98 °F (36 7 °C)  Intake & Output:  I/O       701 - 06/30 0700 06/30 0701 - 07/01 0700 07/01 0701 - 07/02 0700    P  O   0     I V  (mL/kg) 977 1 (9 8) 1000 (9 9) 1033 3 (10 2)    Total Intake(mL/kg) 977 1 (9 8) 1000 (9 9) 1033 3 (10 2)    Urine (mL/kg/hr) 2600 (1 1) 1350 (0 6) 1800 (8 5)    Stool  0     Total Output 2600 1350 1800    Net -1622 9 -830 -766 7           Unmeasured Stool Occurrence  1 x         Weights:   IBW (Ideal Body Weight): 47 8 kg    Body mass index is 42 28 kg/m²  Weight (last 2 days)     Date/Time   Weight    07/01/21 0600   101 (223 77)    06/30/21 0559   99 2 (218 7)    06/29/21 0600   93 1 (205 2)            Physical Exam  Constitutional:       General: She is in acute distress  Appearance: She is obese  HENT:      Head: Normocephalic and atraumatic  Mouth/Throat:      Mouth: Mucous membranes are moist       Comments: patient does not have teeth  Eyes:      Comments: Patient refused to open eyes   Cardiovascular:      Rate and Rhythm: Normal rate and regular rhythm  Pulses: Normal pulses  Heart sounds: Normal heart sounds  Pulmonary:      Effort: Pulmonary effort is normal       Breath sounds: Normal breath sounds  Abdominal:      Palpations: Abdomen is soft  Tenderness: There is no guarding or rebound  Musculoskeletal:      Right lower leg: No edema  Left lower leg: No edema  Skin:     General: Skin is warm and dry  Neurological:      Comments: Patient only repeating "help, please help, and help me", She could not tell me her name or location or the year  Patient did not follow commands on examination  She was moving all 4 limbs equally    Psychiatric:      Comments: Unable to assess due to aphasia/AMS       LABORATORY DATA     Labs: I have personally reviewed pertinent reports    Results from last 7 days   Lab Units 07/01/21  0559 06/30/21  0557 06/29/21  0930 06/28/21  0609   WBC Thousand/uL 8 54 9 08 12 81* 11 10*   HEMOGLOBIN g/dL 14 1 13 7 15 4 15 5*   HEMATOCRIT % 41 4 40 2 43 7 46 0 PLATELETS Thousands/uL 335 302 339 290   NEUTROS PCT % 74 70  --  83*   MONOS PCT % 10 8  --  6      Results from last 7 days   Lab Units 07/01/21  0559 06/30/21  0557 06/29/21  1746 06/29/21  0930 06/28/21  0609   POTASSIUM mmol/L 2 9* 3 1* 3 6 2 7* 2 9*   CHLORIDE mmol/L 103 109*  --  109* 111*   CO2 mmol/L 17* 20*  --  16* 21   BUN mg/dL 5 5  --  5 8   CREATININE mg/dL 0 49* 0 48*  --  0 61 0 84   CALCIUM mg/dL 9 1 8 8  --  8 6 9 1   ALK PHOS U/L  --  98  --  110 113   ALT U/L  --  33  --  37 35   AST U/L  --  25  --  31 30     Results from last 7 days   Lab Units 07/01/21  0559   MAGNESIUM mg/dL 1 5*     Results from last 7 days   Lab Units 07/01/21  0559   PHOSPHORUS mg/dL 2 0*      Results from last 7 days   Lab Units 06/27/21  1102   INR  0 94   PTT seconds 30     Results from last 7 days   Lab Units 06/29/21  1746   LACTIC ACID mmol/L 0 7     Results from last 7 days   Lab Units 06/27/21  1102   TROPONIN I ng/mL <0 02       IMAGING & DIAGNOSTIC TESTING     Radiology Results: I have personally reviewed pertinent reports  XR chest portable    Result Date: 6/30/2021  Impression: No acute cardiopulmonary disease  Workstation performed: [id number]     CT head wo contrast    Result Date: 6/29/2021  Impression: Grossly stable exam   No acute CT abnormality  Workstation performed: [id number]     CT stroke alert brain    Result Date: 6/27/2021  Impression: No acute intracranial abnormality  Findings were directly discussed with Dr Chantel Donovan on 6/27/2021 11:27 AM  Workstation performed: [id number]     US right upper quadrant    Result Date: 6/30/2021  Impression: Pancreas obscured by bowel gas  Cholelithiasis without ultrasonographic evidence for cholecystitis  6 mm echogenic nodule left hepatic lobe, possibly hemangioma although not clearly seen previously  Follow-up ultrasound in 6 months recommended to assess size stability    If there is higher level of clinical concern based on patient's history, shorter interval nonemergent outpatient MRI abdomen with IV contrast (Gadavist) could be obtained for further characterization  The study was marked in [facility] for immediate notification and follow-up  Workstation performed: [id number]     CTA stroke alert (head/neck)    Result Date: 6/27/2021  Impression: No significant carotid or vertebral artery stenosis  No focal intracranial stenosis or aneurysm  Findings were directly discussed with Dr Chantel Donovan on 6/27/2021 11:27 AM  Workstation performed: [id number]     Other Diagnostic Testing: I have personally reviewed pertinent reports      ACTIVE MEDICATIONS     Current Facility-Administered Medications   Medication Dose Route Frequency    acetaminophen (TYLENOL) tablet 650 mg  650 mg Oral Q6H PRN    amLODIPine (NORVASC) tablet 5 mg  5 mg Oral Daily    ascorbic acid (VITAMIN C) tablet 500 mg  500 mg Oral BID    aspirin chewable tablet 81 mg  81 mg Oral Daily    atorvastatin (LIPITOR) tablet 40 mg  40 mg Oral Daily With Dinner    cholecalciferol (VITAMIN D3) tablet 1,000 Units  1,000 Units Oral Daily    Diclofenac Sodium (VOLTAREN) 1 % topical gel 2 g  2 g Topical 4x Daily    DULoxetine (CYMBALTA) delayed release capsule 60 mg  60 mg Oral Daily    enoxaparin (LOVENOX) subcutaneous injection 40 mg  40 mg Subcutaneous B05H    folic acid (FOLVITE) tablet 1,000 mcg  1,000 mcg Oral Daily    Labetalol HCl (NORMODYNE) injection 10 mg  10 mg Intravenous Q6H PRN    lactated ringers infusion  125 mL/hr Intravenous Continuous    loperamide (IMODIUM) oral liquid 2 mg  2 mg Oral TID PRN    LORazepam (ATIVAN) injection 1 mg  1 mg Intravenous Q3H PRN    morphine injection 2 mg  2 mg Intravenous Q6H PRN    omeprazole (PRILOSEC) suspension 2 mg/mL  20 mg Oral Daily    ondansetron (ZOFRAN) injection 4 mg  4 mg Intravenous Q6H PRN    oxyCODONE (ROXICODONE) oral solution 5 mg  5 mg Oral Q6H    oxyCODONE (ROXICODONE) oral solution 5 mg  5 mg Oral Q6H PRN    potassium chloride (K-DUR,KLOR-CON) CR tablet 40 mEq  40 mEq Oral BID    prazosin (MINIPRESS) capsule 2 mg  2 mg Oral Daily       VTE Pharmacologic Prophylaxis: Enoxaparin (Lovenox)  VTE Mechanical Prophylaxis: sequential compression device    Portions of the record may have been created with voice recognition software  Occasional wrong word or "sound a like" substitutions may have occurred due to the inherent limitations of voice recognition software    Read the chart carefully and recognize, using context, where substitutions have occurred   ==  [address]  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

## 2021-07-01 NOTE — PROGRESS NOTES
NEUROLOGY RESIDENCY PROGRESS NOTE     Name: Carlitos Montes   Age & Sex: 60 y o  female   MRN: [id number]  Unit/Bed#: [facility] 832-01   Encounter: [id number]    Recommendations for outpatient neurological follow up have yet to be determined  ASSESSMENT & PLAN     * Encephalopathy  Assessment & Plan  History:  Presenting with echolalia/pralalia, confusion, and possible initial RLE weakness  Last known normal around 10 PM the night before admission  Tardive dyskinesia chronic  Accompanying tachycardia, mucosal dryness, urinary retention, raising concerns for anticholinergic toxicity  Etiology toxic metabolic vs polypharmacy vs stroke  Patient on multiple psychiatric and pain outpatient medications  Currently holding medications, including hydroxyzine 50 mg QHS, nortriptyline 10 mg QHS, trazodone 200 mg QHS, oxybutynin 5 mg BID  Oxycodone has been restarted  Toxicology consulted on admission, and physostigmine x1 administered, with partial relief in aphasia but not confusion  There was also increased secretions and diaphoresis afterwards  Workup:  · CT/CTA negative, and lab workup initially revealed mild alkalosis likely secondary to hyperventilation  Tbil 1 68 and DBil 0 43, ,  Na 134  UA showed some leukocytes  Leukocytosis, bilirubin levels have been improving  · Lyme panel negative, TSH normal  · Routine EEG on 6/28 - "study is consistent with a moderate generalized non-specific encephalopathy " No evidence of electrographic seizures  · MRI incompatible spinal cord stimulator  Verified with Inpatient Radiology Department  · Repeat CT Head on 6/29: no acute intracranial abnormalities    Impression:  Patient significantly more encephalopathic today compared to day prior  Continues with echolalia, significant confusion, unable to follow directions  She continues to be awake and alert, no episodes of drowsiness  Low suspicion for meningitis/encephalitis   Her overall clinical picture is more consistent with a metabolic encephalopathy and given the rise in lactic acid today, concern for an underlying infectious etiology  Plan:  · Will defer additional infectious workup to the primary team  · Will obtain CK due to concern for seizure  · Pain control  · Can continue/restart the following medications:  · Cymbalta 60 mg daily, Lyrica 75 mg daily, Valbenazine 80 mg daily  · Oxycodone has also been restarted  · Neurology service will continue to evaluate the patient    Tardive dyskinesia  Assessment & Plan  Oral-facial tardive dyskinesia secondary to psychiatric medication use  On Ingrezza at home  · As Frida Francis is not on hospital formulary, family has brought up patient's home med which has been profiled  · Continue Ingrezza 80 mg QD    Opioid dependence ([facility] [address] )  Assessment & Plan  Chronic use of opioids for her pain  Most medications have been stopped  Oxycodone restarted  Continue to monitor for signs of withdrawal    Hypokalemia  Assessment & Plan  Potassium 2 9 today  Repletion by primary team    Hypertension  Assessment & Plan  · /103 on admission  On Norvasc 5 mg and prazosin 2 mg daily  · BP has remained persistently elevated  This morning noted to be 200/114    · Plan:  · Continue BP control per primary team    Chronic pain syndrome  Assessment & Plan  Patient with chronic pain in her legs  Polypharmacy  Spinal stimulator present  Plan:  · Patient restarted on Cymbalta and Oxycodone  · Also receiving Lyrica and Voltaren  · Management per SOD        SUBJECTIVE     Patient was seen and examined  No acute events overnight  Patient continues to have echolalia and confusion  She is awake and alert  Unable to participate in exam  There is a notable change in mental status from yesterday  Review of Systems   Unable to perform ROS: Mental status change       OBJECTIVE     Patient ID: Samantha Ritter is a 60 y o  female      Vitals:    06/30/21 1906 06/30/21 1908 06/30/21 2116 07/01/21 0600   BP: 165/100 (!) 160/102 160/98    BP Location:       Pulse:       Resp: 18  16    Temp: 98 9 °F (37 2 °C) 98 9 °F (37 2 °C) 98 °F (36 7 °C)    TempSrc:       SpO2:       Weight:    101 kg (223 lb 12 3 oz)   Height:          Temperature:   Temp (24hrs), Av 4 °F (36 9 °C), Min:98 °F (36 7 °C), Max:98 9 °F (37 2 °C)    Temperature: 98 °F (36 7 °C)      Physical Exam  Vitals and nursing note reviewed  Constitutional:       General: She is awake  She is not in acute distress  Appearance: She is morbidly obese  She is not diaphoretic  HENT:      Head: Normocephalic and atraumatic  Right Ear: External ear normal       Left Ear: External ear normal       Nose: Nose normal       Mouth/Throat:      Mouth: Mucous membranes are dry  Pharynx: Oropharynx is clear  Eyes:      General: No scleral icterus  Extraocular Movements: EOM normal       Conjunctiva/sclera: Conjunctivae normal       Pupils: Pupils are equal, round, and reactive to light  Cardiovascular:      Rate and Rhythm: Normal rate  Pulses: Normal pulses  Heart sounds: Normal heart sounds  Pulmonary:      Effort: Pulmonary effort is normal       Breath sounds: Normal breath sounds  Abdominal:      General: There is no distension  Palpations: Abdomen is soft  Musculoskeletal:      Cervical back: Neck supple  No rigidity  Right lower leg: No edema  Left lower leg: No edema  Skin:     General: Skin is warm and dry  Capillary Refill: Capillary refill takes less than 2 seconds  Neurological:      Mental Status: She is alert  Deep Tendon Reflexes:      Reflex Scores:       Bicep reflexes are 2+ on the right side and 2+ on the left side  Patellar reflexes are 2+ on the right side and 2+ on the left side  Psychiatric:         Cognition and Memory: Cognition is impaired  Neurologic Exam     Mental Status   Oriented to person  Disoriented to place  Disoriented to time     Level of consciousness: alert  Speech clear  Echolalia and perseverating on the word "one " Patient unable to follow commands including squeeze hand, move feet, stick out tongue, etc     Cranial Nerves     CN II   Visual fields full to confrontation  CN III, IV, VI   Pupils are equal, round, and reactive to light  Extraocular motions are normal      CN V   Facial sensation intact  CN VIII   Hearing: intact    Motor Exam   Muscle bulk: normal  Overall muscle tone: normalMoving all extremities  No focal motor deficits     Sensory Exam   Light touch normal      Gait, Coordination, and Reflexes     Reflexes   Right biceps: 2+  Left biceps: 2+  Right patellar: 2+  Left patellar: 2+  Right plantar: normal  Left plantar: normalPatient ambulated to the restroom  Wide based stance  Slow steps  Does not appear to be antalgic        LABORATORY DATA     Labs: I have personally reviewed pertinent reports      Results from last 7 days   Lab Units 07/01/21  0559 06/30/21  0557 06/29/21  0930 06/28/21  0609   WBC Thousand/uL 8 54 9 08 12 81* 11 10*   HEMOGLOBIN g/dL 14 1 13 7 15 4 15 5*   HEMATOCRIT % 41 4 40 2 43 7 46 0   PLATELETS Thousands/uL 335 302 339 290   NEUTROS PCT % 74 70  --  83*   MONOS PCT % 10 8  --  6      Results from last 7 days   Lab Units 07/01/21  0559 06/30/21  0557 06/29/21  1746 06/29/21  0930 06/28/21  0609   SODIUM mmol/L 134* 137  --  137 142   POTASSIUM mmol/L 2 9* 3 1* 3 6 2 7* 2 9*   CHLORIDE mmol/L 103 109*  --  109* 111*   CO2 mmol/L 17* 20*  --  16* 21   BUN mg/dL 5 5  --  5 8   CREATININE mg/dL 0 49* 0 48*  --  0 61 0 84   CALCIUM mg/dL 9 1 8 8  --  8 6 9 1   ALK PHOS U/L  --  98  --  110 113   ALT U/L  --  33  --  37 35   AST U/L  --  25  --  31 30     Results from last 7 days   Lab Units 07/01/21  0559   MAGNESIUM mg/dL 1 5*     Results from last 7 days   Lab Units 07/01/21  0559   PHOSPHORUS mg/dL 2 0*      Results from last 7 days   Lab Units 06/27/21  1102   INR  0 94   PTT seconds 30     Results from last 7 days   Lab Units 07/01/21  1435 07/01/21  1114 06/29/21  1746   LACTIC ACID mmol/L 1 6 4 3* 0 7     Results from last 7 days   Lab Units 06/27/21  1102   TROPONIN I ng/mL <0 02       IMAGING & DIAGNOSTIC TESTING     Radiology Results: I have personally reviewed pertinent reports  US right upper quadrant   Final Result by Chris Finn DO (06/30 1507)      Pancreas obscured by bowel gas  Cholelithiasis without ultrasonographic evidence for cholecystitis  6 mm echogenic nodule left hepatic lobe, possibly hemangioma although not clearly seen previously  Follow-up ultrasound in 6 months recommended to assess size stability  If there is higher level of clinical concern based on patient's history, shorter    interval nonemergent outpatient MRI abdomen with IV contrast (Gadavist) could be obtained for further characterization  The study was marked in [facility] for immediate notification and follow-up  Workstation performed: [id number]         XR chest portable   Final Result by Venessa Escalona MD (06/30 1141)      No acute cardiopulmonary disease  Workstation performed: [id number]         CT head wo contrast   Final Result by Pooja Perez MD (06/29 1406)      Grossly stable exam   No acute CT abnormality  Workstation performed: [id number]         CTA stroke alert (head/neck)   Final Result by Rehana Carroll DO (06/27 1144)      No significant carotid or vertebral artery stenosis  No focal intracranial stenosis or aneurysm  Findings were directly discussed with Dr Maciel Sampson on 6/27/2021 11:27 AM                      Workstation performed: [id number]         CT stroke alert brain   Final Result by Rehana Carroll DO (06/27 1145)      No acute intracranial abnormality           Findings were directly discussed with Dr Maciel Sampson on 6/27/2021 11:27 AM          Workstation performed: [id number]             Other Diagnostic Testing: Echo completed but results are MD Horacio   DULoxetine (CYMBALTA) 60 mg delayed release capsule TAKE 1 CAPSULE BY MOUTH EVERY DAY 4/14/21  Yes Kaitlynn Wood MD   ferrous sulfate 324 (65 Fe) mg TAKE 1 TABLET (324 MG TOTAL) BY MOUTH 2 (TWO) TIMES A DAY BEFORE MEALS 4/13/21  Yes Kaitlynn Wood MD   folic acid (FOLVITE) 1 mg tablet TAKE 1 TABLET BY MOUTH EVERY DAY 4/14/21  Yes Kaitlynn Wood MD   hydrOXYzine HCL (ATARAX) 50 mg tablet TAKE 1 TABLET (50 MG TOTAL) BY MOUTH DAILY AT BEDTIME AS NEEDED FOR ANXIETY (INSOMNIA) 6/3/21  Yes Kaitlynn Wood MD   lidocaine (LMX) 4 % cream Apply topically as needed for mild pain 7/16/20  Yes Tia Barron,    LORazepam (ATIVAN) 0 5 mg tablet Take by mouth every 6 (six) hours 6/3/21  Yes Historical Provider, MD   morphine (MS CONTIN) 15 mg 12 hr tablet Take 1 tablet (15 mg total) by mouth 2 (two) times a day Hold if sedatedMax Daily Amount: 30 mg 6/25/21  Yes Sonja Schwartz MD   Mouthwashes (Biotene Dry Mouth) LIQD Apply 5 mL to the mouth or throat daily   Yes Historical Provider, MD   nortriptyline (PAMELOR) 10 mg capsule Take 1 capsule (10 mg total) by mouth daily at bedtime 4/16/21  Yes Cory Aldana,    orlistat (AYDEN) 60 MG capsule Take 60 mg by mouth 3 (three) times a day with meals   Yes Historical Provider, MD   oxyCODONE (ROXICODONE) 5 mg immediate release tablet Take 1 tablet (5 mg total) by mouth dailyMax Daily Amount: 5 mg 6/25/21  Yes Sonja Schwartz MD   Polyethylene Glycol 3350 (MIRALAX PO) Take by mouth   Yes Historical Provider, MD   prazosin (MINIPRESS) 2 mg capsule TAKE 1 CAPSULE BY MOUTH EVERY DAY 7/9/20  Yes Tan Pitts MD   pregabalin (LYRICA) 225 MG capsule TAKE 1 CAPSULE (225 MG TOTAL) BY MOUTH EVERY 12 (TWELVE) HOURS 4/29/21  Yes Ghada Gomes,    traZODone (DESYREL) 100 mg tablet Take 100 mg by mouth daily at bedtime 2 tablets daily at bedtime   Yes Historical Provider, MD   Valbenazine Tosylate (Ingrezza) 80 MG CAPS Take 80 mg by mouth daily 3/17/21  Yes Kaitlynn Wood MD bisacodyl (DULCOLAX) 5 mg EC tablet Take 5 mg by mouth daily as needed for constipation  Patient not taking: Reported on 6/27/2021    Historical Provider, MD   busPIRone (BUSPAR) 15 mg tablet Take 1 tablet (15 mg total) by mouth 3 (three) times a day  Patient not taking: Reported on 6/23/2021 3/17/21 6/23/21  John Hernández MD   cetirizine (ZyrTEC) 10 mg tablet Take 10 mg by mouth as needed   Patient not taking: Reported on 6/27/2021 11/19/20   Historical Provider, MD   cholecalciferol (VITAMIN D3) 1,000 units tablet Take 1 tablet (1,000 Units total) by mouth daily 8/4/20 6/23/21  John Hernández MD   Diapers & Supplies (Huggies Pull-Ups) MISC Use 3 (three) times a day  Patient not taking: Reported on 6/27/2021 4/20/21   Shelli Waller, DO   Diclofenac Sodium (VOLTAREN) 1 % Apply 2 g topically 4 (four) times a day 6/28/21   Evelyn Amin MD   ipratropium-albuterol (DUO-NEB) 0 5-2 5 mg/3 mL nebulizer solution Take 1 vial (3 mL total) by nebulization every 6 (six) hours as needed for wheezing or shortness of breath  Patient not taking: Reported on 6/10/2021 3/29/21   Concepcion Tran,    Multiple Vitamins-Minerals (multivitamin with minerals) tablet Take 1 tablet by mouth daily  Patient not taking: Reported on 6/27/2021 8/18/20   Christy Hong MD   naloxone [facility]) 4 mg/0 1 mL nasal spray Administer 1 spray into a nostril  If no response after 2-3 minutes, give another dose in the other nostril using a new spray    Patient not taking: Reported on 6/10/2021 3/29/21   Chica Singh,    omeprazole (PriLOSEC) 20 mg delayed release capsule Take 1 capsule (20 mg total) by mouth daily 3/17/21 6/15/21  John Hernández MD   ondansetron (ZOFRAN-ODT) 4 mg disintegrating tablet Take 1 tablet (4 mg total) by mouth every 6 (six) hours as needed for nausea or vomiting  Patient not taking: Reported on 6/27/2021 9/17/20   Ya Aldana DO   oxybutynin (DITROPAN) 5 mg tablet Take 1 tablet (5 mg total) by mouth 2 (two) times a day 3/17/21 6/15/21  Estelle Nixon MD   potassium chloride (MICRO-K) 10 MEQ CR capsule Take 10 mEq by mouth 2 (two) times a day Two tabs, two times daily  Patient not taking: Reported on 6/27/2021    Historical Provider, MD   QUEtiapine (SEROquel) 300 mg tablet Take 300 mg by mouth daily at bedtime  Patient not taking: Reported on 6/27/2021    Historical Provider, MD   Respiratory Therapy Supplies (Nebulizer) YUDY Use as needed (Shortness of breath)  Patient not taking: Reported on 4/20/2021 3/29/21   Annie Weber DO   Sennosides (SENEXON PO) Take by mouth  Patient not taking: Reported on 6/27/2021    Historical Provider, MD   SIMETHICONE PO Take by mouth as needed   Patient not taking: Reported on 6/27/2021    Historical Provider, MD   sodium chloride (OCEAN) 0 65 % nasal spray 2 sprays into each nostril as needed  Patient not taking: Reported on 6/27/2021    Historical Provider, MD   triamcinolone (KENALOG) 0 025 % cream Apply topically 2 (two) times a day  Patient not taking: Reported on 6/27/2021 7/16/20   Derick Apley, DO         VTE Pharmacologic Prophylaxis: Enoxaparin (Lovenox)  VTE Mechanical Prophylaxis: sequential compression device    ==  Ashlyn Labrum, DO   [facility] Neurology Residency, PGY-2

## 2021-07-02 LAB
ALBUMIN SERPL BCP-MCNC: 3 G/DL (ref 3.5–5)
ALP SERPL-CCNC: 98 U/L (ref 46–116)
ALT SERPL W P-5'-P-CCNC: 30 U/L (ref 12–78)
ANION GAP SERPL CALCULATED.3IONS-SCNC: 9 MMOL/L (ref 4–13)
AST SERPL W P-5'-P-CCNC: 28 U/L (ref 5–45)
ATRIAL RATE: 85 BPM
ATRIAL RATE: 88 BPM
ATRIAL RATE: 88 BPM
BILIRUB SERPL-MCNC: 1.5 MG/DL (ref 0.2–1)
BUN SERPL-MCNC: 5 MG/DL (ref 5–25)
CALCIUM ALBUM COR SERPL-MCNC: 9.3 MG/DL (ref 8.3–10.1)
CALCIUM SERPL-MCNC: 8.5 MG/DL (ref 8.3–10.1)
CHLORIDE SERPL-SCNC: 110 MMOL/L (ref 100–108)
CO2 SERPL-SCNC: 18 MMOL/L (ref 21–32)
CREAT SERPL-MCNC: 0.54 MG/DL (ref 0.6–1.3)
ERYTHROCYTE [DISTWIDTH] IN BLOOD BY AUTOMATED COUNT: 13.8 % (ref 11.6–15.1)
GFR SERPL CREATININE-BSD FRML MDRD: 121 ML/MIN/1.73SQ M
GLUCOSE SERPL-MCNC: 93 MG/DL (ref 65–140)
HCT VFR BLD AUTO: 37.8 % (ref 34.8–46.1)
HGB BLD-MCNC: 12.7 G/DL (ref 11.5–15.4)
MAGNESIUM SERPL-MCNC: 2.2 MG/DL (ref 1.6–2.6)
MCH RBC QN AUTO: 29.1 PG (ref 26.8–34.3)
MCHC RBC AUTO-ENTMCNC: 33.6 G/DL (ref 31.4–37.4)
MCV RBC AUTO: 87 FL (ref 82–98)
P AXIS: 44 DEGREES
P AXIS: 56 DEGREES
P AXIS: 62 DEGREES
PHOSPHATE SERPL-MCNC: 4 MG/DL (ref 2.7–4.5)
PLATELET # BLD AUTO: 268 THOUSANDS/UL (ref 149–390)
PMV BLD AUTO: 9.7 FL (ref 8.9–12.7)
POTASSIUM SERPL-SCNC: 3.6 MMOL/L (ref 3.5–5.3)
PR INTERVAL: 104 MS
PR INTERVAL: 108 MS
PR INTERVAL: 114 MS
PROCALCITONIN SERPL-MCNC: <0.05 NG/ML
PROT SERPL-MCNC: 7 G/DL (ref 6.4–8.2)
QRS AXIS: 90 DEGREES
QRS AXIS: 93 DEGREES
QRS AXIS: 93 DEGREES
QRSD INTERVAL: 80 MS
QRSD INTERVAL: 90 MS
QRSD INTERVAL: 96 MS
QT INTERVAL: 340 MS
QT INTERVAL: 370 MS
QT INTERVAL: 386 MS
QTC INTERVAL: 404 MS
QTC INTERVAL: 447 MS
QTC INTERVAL: 467 MS
RBC # BLD AUTO: 4.36 MILLION/UL (ref 3.81–5.12)
SODIUM SERPL-SCNC: 137 MMOL/L (ref 136–145)
T WAVE AXIS: -14 DEGREES
T WAVE AXIS: 10 DEGREES
T WAVE AXIS: 17 DEGREES
VENTRICULAR RATE: 85 BPM
VENTRICULAR RATE: 88 BPM
VENTRICULAR RATE: 88 BPM
VIT B1 BLD-SCNC: 146.2 NMOL/L (ref 66.5–200)
WBC # BLD AUTO: 7.56 THOUSAND/UL (ref 4.31–10.16)

## 2021-07-02 PROCEDURE — 83735 ASSAY OF MAGNESIUM: CPT | Performed by: STUDENT IN AN ORGANIZED HEALTH CARE EDUCATION/TRAINING PROGRAM

## 2021-07-02 PROCEDURE — 84100 ASSAY OF PHOSPHORUS: CPT | Performed by: STUDENT IN AN ORGANIZED HEALTH CARE EDUCATION/TRAINING PROGRAM

## 2021-07-02 PROCEDURE — 99233 SBSQ HOSP IP/OBS HIGH 50: CPT | Performed by: INTERNAL MEDICINE

## 2021-07-02 PROCEDURE — 85027 COMPLETE CBC AUTOMATED: CPT | Performed by: STUDENT IN AN ORGANIZED HEALTH CARE EDUCATION/TRAINING PROGRAM

## 2021-07-02 PROCEDURE — 93010 ELECTROCARDIOGRAM REPORT: CPT | Performed by: INTERNAL MEDICINE

## 2021-07-02 PROCEDURE — 99232 SBSQ HOSP IP/OBS MODERATE 35: CPT | Performed by: PSYCHIATRY & NEUROLOGY

## 2021-07-02 PROCEDURE — 80053 COMPREHEN METABOLIC PANEL: CPT | Performed by: STUDENT IN AN ORGANIZED HEALTH CARE EDUCATION/TRAINING PROGRAM

## 2021-07-02 PROCEDURE — 84145 PROCALCITONIN (PCT): CPT | Performed by: STUDENT IN AN ORGANIZED HEALTH CARE EDUCATION/TRAINING PROGRAM

## 2021-07-02 PROCEDURE — 92526 ORAL FUNCTION THERAPY: CPT

## 2021-07-02 RX ORDER — AMLODIPINE BESYLATE 10 MG/1
10 TABLET ORAL DAILY
Status: DISCONTINUED | OUTPATIENT
Start: 2021-07-03 | End: 2021-07-03

## 2021-07-02 RX ORDER — AMLODIPINE BESYLATE 5 MG/1
5 TABLET ORAL ONCE
Status: COMPLETED | OUTPATIENT
Start: 2021-07-02 | End: 2021-07-02

## 2021-07-02 RX ADMIN — DULOXETINE HYDROCHLORIDE 60 MG: 60 CAPSULE, DELAYED RELEASE ORAL at 09:02

## 2021-07-02 RX ADMIN — OXYCODONE HYDROCHLORIDE 5 MG: 5 SOLUTION ORAL at 12:37

## 2021-07-02 RX ADMIN — OXYCODONE HYDROCHLORIDE 5 MG: 5 SOLUTION ORAL at 00:13

## 2021-07-02 RX ADMIN — GLYCERIN, HYPROMELLOSE, POLYETHYLENE GLYCOL 1 DROP: .2; .2; 1 LIQUID OPHTHALMIC at 17:51

## 2021-07-02 RX ADMIN — SODIUM CHLORIDE, SODIUM LACTATE, POTASSIUM CHLORIDE, AND CALCIUM CHLORIDE 100 ML/HR: .6; .31; .03; .02 INJECTION, SOLUTION INTRAVENOUS at 18:15

## 2021-07-02 RX ADMIN — ACETAMINOPHEN 650 MG: 325 TABLET, FILM COATED ORAL at 12:30

## 2021-07-02 RX ADMIN — ASPIRIN 81 MG CHEWABLE TABLET 81 MG: 81 TABLET CHEWABLE at 09:02

## 2021-07-02 RX ADMIN — DICLOFENAC SODIUM 2 G: 10 GEL TOPICAL at 09:20

## 2021-07-02 RX ADMIN — PREGABALIN 100 MG: 100 CAPSULE ORAL at 09:02

## 2021-07-02 RX ADMIN — SODIUM CHLORIDE, SODIUM LACTATE, POTASSIUM CHLORIDE, AND CALCIUM CHLORIDE 125 ML/HR: .6; .31; .03; .02 INJECTION, SOLUTION INTRAVENOUS at 00:07

## 2021-07-02 RX ADMIN — OXYCODONE HYDROCHLORIDE 5 MG: 5 SOLUTION ORAL at 17:52

## 2021-07-02 RX ADMIN — Medication 1000 UNITS: at 09:02

## 2021-07-02 RX ADMIN — LORAZEPAM 1 MG: 2 INJECTION INTRAMUSCULAR; INTRAVENOUS at 01:54

## 2021-07-02 RX ADMIN — OXYCODONE HYDROCHLORIDE AND ACETAMINOPHEN 500 MG: 500 TABLET ORAL at 17:51

## 2021-07-02 RX ADMIN — SODIUM CHLORIDE, SODIUM LACTATE, POTASSIUM CHLORIDE, AND CALCIUM CHLORIDE 125 ML/HR: .6; .31; .03; .02 INJECTION, SOLUTION INTRAVENOUS at 09:02

## 2021-07-02 RX ADMIN — ENOXAPARIN SODIUM 40 MG: 40 INJECTION SUBCUTANEOUS at 21:39

## 2021-07-02 RX ADMIN — QUETIAPINE FUMARATE 25 MG: 25 TABLET ORAL at 21:39

## 2021-07-02 RX ADMIN — AMLODIPINE BESYLATE 5 MG: 5 TABLET ORAL at 09:02

## 2021-07-02 RX ADMIN — OXYCODONE HYDROCHLORIDE 5 MG: 5 SOLUTION ORAL at 06:05

## 2021-07-02 RX ADMIN — FOLIC ACID 1000 MCG: 1 TABLET ORAL at 09:02

## 2021-07-02 RX ADMIN — ATORVASTATIN CALCIUM 40 MG: 40 TABLET, FILM COATED ORAL at 17:52

## 2021-07-02 RX ADMIN — Medication 20 MG: at 09:21

## 2021-07-02 RX ADMIN — OXYCODONE HYDROCHLORIDE AND ACETAMINOPHEN 500 MG: 500 TABLET ORAL at 09:02

## 2021-07-02 RX ADMIN — CEFTRIAXONE SODIUM 1000 MG: 10 INJECTION, POWDER, FOR SOLUTION INTRAVENOUS at 21:39

## 2021-07-02 RX ADMIN — AMLODIPINE BESYLATE 5 MG: 5 TABLET ORAL at 12:30

## 2021-07-02 RX ADMIN — DICLOFENAC SODIUM 2 G: 10 GEL TOPICAL at 12:31

## 2021-07-02 RX ADMIN — PRAZOSIN HYDROCHLORIDE 2 MG: 2 CAPSULE ORAL at 13:42

## 2021-07-02 NOTE — PROGRESS NOTES
NEUROLOGY RESIDENCY PROGRESS NOTE     Name: Jamir Zelaya   Age & Sex: 60 y o  female   MRN: [id number]  Unit/Bed#: [facility] 832-01   Encounter: [id number]    Patient will not need to follow up with outpatient neurology and will not need any additional neurologic testing  ASSESSMENT & PLAN     * Encephalopathy  Assessment & Plan  History:  Presenting with echolalia/pralalia, confusion, and possible initial RLE weakness  Last known normal around 10 PM the night before admission  Tardive dyskinesia chronic  Accompanying tachycardia, mucosal dryness, urinary retention, raising concerns for anticholinergic toxicity  Etiology toxic metabolic vs polypharmacy  Less likely stroke  Patient on multiple psychiatric and pain outpatient medications  Currently holding medications, including hydroxyzine 50 mg QHS, nortriptyline 10 mg QHS, trazodone 200 mg QHS, oxybutynin 5 mg BID  Oxycodone has been restarted  Toxicology consulted on admission, and physostigmine x1 administered, with partial relief in aphasia but not confusion  There was also increased secretions and diaphoresis afterwards  Workup:  · CT/CTA negative, and lab workup initially revealed mild alkalosis likely secondary to hyperventilation  Tbil 1 68 and DBil 0 43, ,  Na 134  UA showed some leukocytes  Leukocytosis, bilirubin levels have been improving  · Lyme panel negative, TSH normal  · Routine EEG on 6/28 - "study is consistent with a moderate generalized non-specific encephalopathy " No evidence of electrographic seizures  · MRI incompatible spinal cord stimulator  Verified with Inpatient Radiology Department  · Repeat CT Head on 6/29: no acute intracranial abnormalities  · On 7/1: patient developed an elevated lactic acid level (4 3) with abnormal UA    Impression:  Patient is significantly improved today compared to day prior  She is awake and oriented to person, time, and place   Her lactic acid levels lowered to normal after IV fluids administration  Low suspicion for meningitis/encephalitis  Overall clinical picture is more consistent with a metabolic encephalopathy and volume depletion  Also, given abnormal urinalysis results, there may be concern for an underlying infectious etiology  Plan:  · IV fluids and antibiotics as per primary team  · No need for VEEG  · Pain control  · Can continue/restart the following medications:  · Cymbalta 60 mg daily, Lyrica 75 mg daily, Valbenazine 80 mg daily  · Oxycodone has also been restarted  · Restarted home Seroquel    Tardive dyskinesia  Assessment & Plan  Oral-facial tardive dyskinesia secondary to psychiatric medication use  On Ingrezza at home  · As Omer Celestin is not on hospital formulary, family has brought up patient's home med which has been profiled  · Continue Ingrezza 80 mg QD    Opioid dependence ([facility] [address] )  Assessment & Plan  Chronic use of opioids for her pain  Most medications have been stopped  Oxycodone restarted  Continue to monitor for signs of withdrawal    Hypokalemia  Assessment & Plan  Potassium 3 6 today  Repletion by primary team    Hypertension  Assessment & Plan  · /103 on admission  On Norvasc 5 mg and prazosin 2 mg daily  · BP has remained persistently elevated  This morning noted to be 160/96  · Plan:  · Continue BP control per primary team    Chronic pain syndrome  Assessment & Plan  Patient with chronic pain in her legs  Polypharmacy  Spinal stimulator present  Plan:  · Patient restarted on Cymbalta and Oxycodone  · Also receiving Lyrica and Voltaren  · Management per [place]    Neurology will be signing off of this patient  Please feel free to contact if any further questions or concerns arise  SUBJECTIVE     Patient was seen and examined  No acute events overnight  She is significantly improved today compared to day prior  She is awake and oriented to person, time, and place  She is speaking in full sentences and is able to identify objects   She still reports a lot of leg pain  Still has oral-facial tardive dyskinesia  Review of Systems   Constitutional: Negative for chills and fever  HENT: Negative for congestion and nosebleeds  Eyes: Negative for visual disturbance  Respiratory: Negative for shortness of breath  Cardiovascular: Negative for chest pain  Gastrointestinal: Negative for abdominal pain  Endocrine: Negative for polydipsia and polyuria  Genitourinary: Negative for difficulty urinating and dysuria  Musculoskeletal: Positive for myalgias  Skin: Negative for rash  Neurological: Negative for seizures and weakness  + chronic neuropathic pain in BLE   Psychiatric/Behavioral: Negative for agitation and confusion  OBJECTIVE     Patient ID: Surjit Srinivasan is a 60 y o  female  Vitals:    21 2211 21 0600 21 0725 21 1538   BP: (!) 160/102  160/96 100/57   Pulse:   82 60   Resp:   20 16   Temp: 97 5 °F (36 4 °C)  (!) 96 9 °F (36 1 °C) 98 7 °F (37 1 °C)   TempSrc:    Axillary   SpO2:   100% 100%   Weight:  99 4 kg (219 lb 2 2 oz)     Height:          Temperature:   Temp (24hrs), Av 7 °F (36 5 °C), Min:96 9 °F (36 1 °C), Max:98 7 °F (37 1 °C)    Temperature: 98 7 °F (37 1 °C)      Physical Exam  Vitals and nursing note reviewed  Constitutional:       General: She is awake  She is not in acute distress  Appearance: She is well-developed  She is morbidly obese  Interventions: She is restrained  Comments: [place] in place   HENT:      Head: Normocephalic and atraumatic  Right Ear: External ear normal       Left Ear: External ear normal       Nose: Nose normal       Mouth/Throat:      Mouth: Mucous membranes are dry  Pharynx: Oropharynx is clear  Comments: Orofacial tardive dyskinesia present  Eyes:      General: No scleral icterus       Extraocular Movements: Extraocular movements intact and EOM normal       Conjunctiva/sclera: Conjunctivae normal       Pupils: Pupils are equal, round, and reactive to light  Cardiovascular:      Rate and Rhythm: Normal rate and regular rhythm  Pulses: Normal pulses  Heart sounds: No murmur heard  Pulmonary:      Effort: Pulmonary effort is normal  No respiratory distress  Abdominal:      General: Abdomen is protuberant  There is no distension  Musculoskeletal:      Cervical back: Neck supple  No rigidity  Right lower leg: No edema  Left lower leg: No edema  Skin:     General: Skin is warm and dry  Neurological:      Mental Status: She is alert and oriented to person, place, and time  Mental status is at baseline  Deep Tendon Reflexes: Strength normal       Reflex Scores:       Tricep reflexes are 2+ on the right side and 2+ on the left side  Bicep reflexes are 2+ on the right side and 2+ on the left side  Brachioradialis reflexes are 2+ on the right side and 2+ on the left side  Patellar reflexes are 2+ on the right side and 2+ on the left side  Achilles reflexes are 2+ on the right side and 2+ on the left side  Psychiatric:         Mood and Affect: Mood is anxious  Speech: Speech normal          Behavior: Behavior is cooperative  Comments: Compared to day prior, less perseverating  Able to follow commands and carry out a conversation  Neurologic Exam     Mental Status   Oriented to person, place, and time  Speech: speech is normal   Level of consciousness: alert  Able to name object  Able to repeat  Normal comprehension  Cranial Nerves     CN II   Visual fields full to confrontation  CN III, IV, VI   Pupils are equal, round, and reactive to light  Extraocular motions are normal      CN V   Facial sensation intact  CN VII   Facial expression full, symmetric       CN VIII   Hearing: intact    CN IX, X   Palate: symmetric    CN XI   Right sternocleidomastoid strength: normal  Left sternocleidomastoid strength: normal    CN XII   Tongue deviation: none    Motor Exam   Muscle bulk: normal  Overall muscle tone: normal    Strength   Strength 5/5 throughout  Sensory Exam   Light touch normal      Gait, Coordination, and Reflexes     Gait  Gait: (defered)    Reflexes   Right brachioradialis: 2+  Left brachioradialis: 2+  Right biceps: 2+  Left biceps: 2+  Right triceps: 2+  Left triceps: 2+  Right patellar: 2+  Left patellar: 2+  Right achilles: 2+  Left achilles: 2+  Right plantar: normal  Left plantar: normal       LABORATORY DATA     Labs: I have personally reviewed pertinent reports  Results from last 7 days   Lab Units 07/02/21  0753 07/01/21  0559 06/30/21  0557 06/28/21  0609   WBC Thousand/uL 7 56 8 54 9 08 11 10*   HEMOGLOBIN g/dL 12 7 14 1 13 7 15 5*   HEMATOCRIT % 37 8 41 4 40 2 46 0   PLATELETS Thousands/uL 268 335 302 290   NEUTROS PCT %  --  74 70 83*   MONOS PCT %  --  10 8 6      Results from last 7 days   Lab Units 07/02/21  0459 07/01/21  1434 07/01/21  0559 06/30/21  0557   SODIUM mmol/L 137 137 134* 137   POTASSIUM mmol/L 3 6 3 3* 2 9* 3 1*   CHLORIDE mmol/L 110* 104 103 109*   CO2 mmol/L 18* 20* 17* 20*   BUN mg/dL 5 6 5 5   CREATININE mg/dL 0 54* 0 67 0 49* 0 48*   CALCIUM mg/dL 8 5 8 7 9 1 8 8   ALK PHOS U/L 98 99  --  98   ALT U/L 30 31  --  33   AST U/L 28 18  --  25     Results from last 7 days   Lab Units 07/02/21  0459 07/01/21  0559   MAGNESIUM mg/dL 2 2 1 5*     Results from last 7 days   Lab Units 07/02/21  0459 07/01/21  0559   PHOSPHORUS mg/dL 4 0 2 0*      Results from last 7 days   Lab Units 06/27/21  1102   INR  0 94   PTT seconds 30     Results from last 7 days   Lab Units 07/01/21  1435   LACTIC ACID mmol/L 1 6     Results from last 7 days   Lab Units 06/27/21  1102   TROPONIN I ng/mL <0 02       IMAGING & DIAGNOSTIC TESTING     Radiology Results: I have personally reviewed pertinent reports  US right upper quadrant   Final Result by Mini Choudhury DO (06/30 9742)      Pancreas obscured by bowel gas        Cholelithiasis without ultrasonographic evidence for cholecystitis  6 mm echogenic nodule left hepatic lobe, possibly hemangioma although not clearly seen previously  Follow-up ultrasound in 6 months recommended to assess size stability  If there is higher level of clinical concern based on patient's history, shorter    interval nonemergent outpatient MRI abdomen with IV contrast (Gadavist) could be obtained for further characterization  The study was marked in [facility] for immediate notification and follow-up  Workstation performed: [id number]         XR chest portable   Final Result by Michael Adames MD (06/30 1141)      No acute cardiopulmonary disease  Workstation performed: [id number]         CT head wo contrast   Final Result by Jaqui Ballesteros MD (06/29 1406)      Grossly stable exam   No acute CT abnormality  Workstation performed: [id number]         CTA stroke alert (head/neck)   Final Result by Maia Gamino DO (06/27 1144)      No significant carotid or vertebral artery stenosis  No focal intracranial stenosis or aneurysm  Findings were directly discussed with Dr Zac Pink on 6/27/2021 11:27 AM                      Workstation performed: [id number]         CT stroke alert brain   Final Result by Maia Gamino DO (06/27 1145)      No acute intracranial abnormality           Findings were directly discussed with Dr Zac Pink on 6/27/2021 11:27 AM          Workstation performed: [id number]             Other Diagnostic Testing: No new additional testing to review    ACTIVE MEDICATIONS     Current Facility-Administered Medications   Medication Dose Route Frequency    acetaminophen (TYLENOL) tablet 650 mg  650 mg Oral Q6H PRN    [START ON 7/3/2021] amLODIPine (NORVASC) tablet 10 mg  10 mg Oral Daily    ascorbic acid (VITAMIN C) tablet 500 mg  500 mg Oral BID    aspirin chewable tablet 81 mg  81 mg Oral Daily    atorvastatin (LIPITOR) tablet 40 mg  40 mg Oral Daily With Dinner    cefTRIAXone (ROCEPHIN) 1,000 mg in dextrose 5 % 50 mL IVPB  1,000 mg Intravenous Q24H    cholecalciferol (VITAMIN D3) tablet 1,000 Units  1,000 Units Oral Daily    Diclofenac Sodium (VOLTAREN) 1 % topical gel 2 g  2 g Topical 4x Daily    DULoxetine (CYMBALTA) delayed release capsule 60 mg  60 mg Oral Daily    enoxaparin (LOVENOX) subcutaneous injection 40 mg  40 mg Subcutaneous Y51F    folic acid (FOLVITE) tablet 1,000 mcg  1,000 mcg Oral Daily    glycerin-hypromellose- (ARTIFICIAL TEARS) ophthalmic solution 1 drop  1 drop Both Eyes BID    Labetalol HCl (NORMODYNE) injection 10 mg  10 mg Intravenous Q6H PRN    lactated ringers infusion  100 mL/hr Intravenous Continuous    LORazepam (ATIVAN) injection 1 mg  1 mg Intravenous Q3H PRN    omeprazole (PRILOSEC) suspension 2 mg/mL  20 mg Oral Daily    ondansetron (ZOFRAN) injection 4 mg  4 mg Intravenous Q6H PRN    oxyCODONE (ROXICODONE) oral solution 5 mg  5 mg Oral Q6H    oxyCODONE (ROXICODONE) oral solution 5 mg  5 mg Oral Q6H PRN    prazosin (MINIPRESS) capsule 2 mg  2 mg Oral Daily    pregabalin (LYRICA) capsule 100 mg  100 mg Oral Daily    QUEtiapine (SEROquel) tablet 25 mg  25 mg Oral HS       Prior to Admission medications    Medication Sig Start Date End Date Taking?  Authorizing Provider   acetaminophen (TYLENOL) 325 mg tablet Take 2 tablets (650 mg total) by mouth every 8 (eight) hours as needed for mild pain 3/9/21  Yes Maribel Lua DO   amLODIPine (NORVASC) 5 mg tablet Take 1 tablet (5 mg total) by mouth daily 6/5/21 9/3/21 Yes Noman Chacon MD   [facility] Vitamin C 500 MG tablet TAKE 1 TABLET BY MOUTH TWICE A DAY 4/14/21  Yes Noman Chacon MD   DULoxetine (CYMBALTA) 60 mg delayed release capsule TAKE 1 CAPSULE BY MOUTH EVERY DAY 4/14/21  Yes Noman Chacon MD   ferrous sulfate 324 (65 Fe) mg TAKE 1 TABLET (324 MG TOTAL) BY MOUTH 2 (TWO) TIMES A DAY BEFORE MEALS 4/13/21  Yes Noman Chacon MD   folic acid (FOLVITE) 1 mg tablet TAKE 1 TABLET BY MOUTH EVERY DAY 4/14/21  Yes Diamond John MD   hydrOXYzine HCL (ATARAX) 50 mg tablet TAKE 1 TABLET (50 MG TOTAL) BY MOUTH DAILY AT BEDTIME AS NEEDED FOR ANXIETY (INSOMNIA) 6/3/21  Yes Diamond John MD   lidocaine (LMX) 4 % cream Apply topically as needed for mild pain 7/16/20  Yes Kong Wyatt DO   LORazepam (ATIVAN) 0 5 mg tablet Take by mouth every 6 (six) hours 6/3/21  Yes Historical Provider, MD   morphine (MS CONTIN) 15 mg 12 hr tablet Take 1 tablet (15 mg total) by mouth 2 (two) times a day Hold if sedatedMax Daily Amount: 30 mg 6/25/21  Yes Niurka Urrutia MD   Mouthwashes (Biotene Dry Mouth) LIQD Apply 5 mL to the mouth or throat daily   Yes Historical Provider, MD   nortriptyline (PAMELOR) 10 mg capsule Take 1 capsule (10 mg total) by mouth daily at bedtime 4/16/21  Yes Cory Aldana DO   orlistat (AYDEN) 60 MG capsule Take 60 mg by mouth 3 (three) times a day with meals   Yes Historical Provider, MD   oxyCODONE (ROXICODONE) 5 mg immediate release tablet Take 1 tablet (5 mg total) by mouth dailyMax Daily Amount: 5 mg 6/25/21  Yes Niurka Urrutia MD   Polyethylene Glycol 3350 (MIRALAX PO) Take by mouth   Yes Historical Provider, MD   prazosin (MINIPRESS) 2 mg capsule TAKE 1 CAPSULE BY MOUTH EVERY DAY 7/9/20  Yes Vaibhav Huerta MD   pregabalin (LYRICA) 225 MG capsule TAKE 1 CAPSULE (225 MG TOTAL) BY MOUTH EVERY 12 (TWELVE) HOURS 4/29/21  Yes Erika Ureña DO   traZODone (DESYREL) 100 mg tablet Take 100 mg by mouth daily at bedtime 2 tablets daily at bedtime   Yes Historical Provider, MD   Valbenazine Tosylate (Ingrezza) 80 MG CAPS Take 80 mg by mouth daily 3/17/21  Yes Diamond John MD   bisacodyl (DULCOLAX) 5 mg EC tablet Take 5 mg by mouth daily as needed for constipation  Patient not taking: Reported on 6/27/2021    Historical Provider, MD   busPIRone (BUSPAR) 15 mg tablet Take 1 tablet (15 mg total) by mouth 3 (three) times a day  Patient not taking: Reported on 6/23/2021 3/17/21 6/23/21  Lizeth Beckham MD   cetirizine (ZyrTEC) 10 mg tablet Take 10 mg by mouth as needed   Patient not taking: Reported on 6/27/2021 11/19/20   Historical Provider, MD   cholecalciferol (VITAMIN D3) 1,000 units tablet Take 1 tablet (1,000 Units total) by mouth daily 8/4/20 6/23/21  Lizeth Beckham MD   Diapers & Supplies (Huggies Pull-Ups) MISC Use 3 (three) times a day  Patient not taking: Reported on 6/27/2021 4/20/21   David Gustafson DO   Diclofenac Sodium (VOLTAREN) 1 % Apply 2 g topically 4 (four) times a day 6/28/21   Kellie Martin MD   ipratropium-albuterol (DUO-NEB) 0 5-2 5 mg/3 mL nebulizer solution Take 1 vial (3 mL total) by nebulization every 6 (six) hours as needed for wheezing or shortness of breath  Patient not taking: Reported on 6/10/2021 3/29/21   Abby Doyle DO   Multiple Vitamins-Minerals (multivitamin with minerals) tablet Take 1 tablet by mouth daily  Patient not taking: Reported on 6/27/2021 8/18/20   Alis Wright MD   naloxone [facility]) 4 mg/0 1 mL nasal spray Administer 1 spray into a nostril  If no response after 2-3 minutes, give another dose in the other nostril using a new spray    Patient not taking: Reported on 6/10/2021 3/29/21   Sumi Recio DO   omeprazole (PriLOSEC) 20 mg delayed release capsule Take 1 capsule (20 mg total) by mouth daily 3/17/21 6/15/21  Lizeth Beckham MD   ondansetron (ZOFRAN-ODT) 4 mg disintegrating tablet Take 1 tablet (4 mg total) by mouth every 6 (six) hours as needed for nausea or vomiting  Patient not taking: Reported on 6/27/2021 9/17/20   Justina Aldana,    oxybutynin (DITROPAN) 5 mg tablet Take 1 tablet (5 mg total) by mouth 2 (two) times a day 3/17/21 6/15/21  Lizeth Beckham MD   potassium chloride (MICRO-K) 10 MEQ CR capsule Take 10 mEq by mouth 2 (two) times a day Two tabs, two times daily  Patient not taking: Reported on 6/27/2021    Historical Provider, MD   QUEtiapine (SEROquel) 300 mg tablet Take 300 mg by mouth daily at bedtime  Patient not taking: Reported on 6/27/2021    Historical Provider, MD   Respiratory Therapy Supplies (Nebulizer) YUDY Use as needed (Shortness of breath)  Patient not taking: Reported on 4/20/2021 3/29/21   Esther Jacques DO   Sennosides (SENEXON PO) Take by mouth  Patient not taking: Reported on 6/27/2021    Historical Provider, MD   SIMETHICONE PO Take by mouth as needed   Patient not taking: Reported on 6/27/2021    Historical Provider, MD   sodium chloride (OCEAN) 0 65 % nasal spray 2 sprays into each nostril as needed  Patient not taking: Reported on 6/27/2021    Historical Provider, MD   triamcinolone (KENALOG) 0 025 % cream Apply topically 2 (two) times a day  Patient not taking: Reported on 6/27/2021 7/16/20   Yahaira Knight DO         VTE Pharmacologic Prophylaxis: Enoxaparin (Lovenox)  VTE Mechanical Prophylaxis: sequential compression device    ==  Sharmin Mc MS3  [place]

## 2021-07-02 NOTE — PLAN OF CARE
Problem: Potential for Falls  Goal: Patient will remain free of falls  Description: INTERVENTIONS:  - Educate patient/family on patient safety including physical limitations  - Instruct patient to call for assistance with activity   - Consult OT/PT to assist with strengthening/mobility   - Keep Call bell within reach  - Keep bed low and locked with side rails adjusted as appropriate  - Keep care items and personal belongings within reach  - Initiate and maintain comfort rounds  - Make Fall Risk Sign visible to staff  - Offer Toileting every 2 Hours, in advance of need  - Initiate/Maintain bed alarm  - Obtain necessary fall risk management equipment: bed alarm  - Apply yellow socks and bracelet for high fall risk patients  - Consider moving patient to room near nurses station  Outcome: Progressing     Problem: MOBILITY - ADULT  Goal: Maintain or return to baseline ADL function  Description: INTERVENTIONS:  -  Assess patient's ability to carry out ADLs; assess patient's baseline for ADL function and identify physical deficits which impact ability to perform ADLs (bathing, care of mouth/teeth, toileting, grooming, dressing, etc )  - Assess/evaluate cause of self-care deficits   - Assess range of motion  - Assess patient's mobility; develop plan if impaired  - Assess patient's need for assistive devices and provide as appropriate  - Encourage maximum independence but intervene and supervise when necessary  - Involve family in performance of ADLs  - Assess for home care needs following discharge   - Consider OT consult to assist with ADL evaluation and planning for discharge  - Provide patient education as appropriate  Outcome: Progressing  Goal: Maintains/Returns to pre admission functional level  Description: INTERVENTIONS:  - Perform BMAT or MOVE assessment daily    - Set and communicate daily mobility goal to care team and patient/family/caregiver     - Collaborate with rehabilitation services on mobility goals if consulted  - Out of bed for toileting  - Record patient progress and toleration of activity level   Outcome: Progressing     Problem: NEUROSENSORY - ADULT  Goal: Achieves stable or improved neurological status  Description: INTERVENTIONS  - Monitor and report changes in neurological status  - Monitor vital signs such as temperature, blood pressure, glucose, and any other labs ordered   - Initiate measures to prevent increased intracranial pressure  - Monitor for seizure activity and implement precautions if appropriate      Outcome: Progressing     Problem: SAFETY ADULT  Goal: Patient will remain free of falls  Description: INTERVENTIONS:  - Educate patient/family on patient safety including physical limitations  - Instruct patient to call for assistance with activity   - Consult OT/PT to assist with strengthening/mobility   - Keep Call bell within reach  - Keep bed low and locked with side rails adjusted as appropriate  - Keep care items and personal belongings within reach  - Initiate and maintain comfort rounds  - Make Fall Risk Sign visible to staff  - Apply yellow socks and bracelet for high fall risk patients  - Consider moving patient to room near nurses station  Outcome: Progressing     Problem: DISCHARGE PLANNING  Goal: Discharge to home or other facility with appropriate resources  Description: INTERVENTIONS:  - Identify barriers to discharge w/patient and caregiver  - Arrange for needed discharge resources and transportation as appropriate  - Identify discharge learning needs (meds, wound care, etc )  - Arrange for interpretive services to assist at discharge as needed  - Refer to Case Management Department for coordinating discharge planning if the patient needs post-hospital services based on physician/advanced practitioner order or complex needs related to functional status, cognitive ability, or social support system  Outcome: Progressing     Problem: Knowledge Deficit  Goal: Patient/family/caregiver demonstrates understanding of disease process, treatment plan, medications, and discharge instructions  Description: Complete learning assessment and assess knowledge base  Interventions:  - Provide teaching at level of understanding  - Provide teaching via preferred learning methods  Outcome: Progressing     Problem: Prexisting or High Potential for Compromised Skin Integrity  Goal: Skin integrity is maintained or improved  Description: INTERVENTIONS:  - Identify patients at risk for skin breakdown  - Assess and monitor skin integrity  - Assess and monitor nutrition and hydration status  - Monitor labs   - Assess for incontinence   - Turn and reposition patient  - Assist with mobility/ambulation  - Relieve pressure over bony prominences  - Avoid friction and shearing  - Provide appropriate hygiene as needed including keeping skin clean and dry  - Evaluate need for skin moisturizer/barrier cream  - Collaborate with interdisciplinary team   - Patient/family teaching  - Consider wound care consult   Outcome: Progressing     Problem: Nutrition/Hydration-ADULT  Goal: Nutrient/Hydration intake appropriate for improving, restoring or maintaining nutritional needs  Description: Monitor and assess patient's nutrition/hydration status for malnutrition  Collaborate with interdisciplinary team and initiate plan and interventions as ordered  Monitor patient's weight and dietary intake as ordered or per policy  Utilize nutrition screening tool and intervene as necessary  Determine patient's food preferences and provide high-protein, high-caloric foods as appropriate       INTERVENTIONS:  - Monitor oral intake, urinary output, labs, and treatment plans  - Assess nutrition and hydration status and recommend course of action  - Evaluate amount of meals eaten  - Assist patient with eating if necessary   - Allow adequate time for meals  - Recommend/ encourage appropriate diets, oral nutritional supplements, and vitamin/mineral supplements  - Order, calculate, and assess calorie counts as needed  - Recommend, monitor, and adjust tube feedings and TPN/PPN based on assessed needs  - Assess need for intravenous fluids  - Provide specific nutrition/hydration education as appropriate  - Include patient/family/caregiver in decisions related to nutrition  Outcome: Progressing

## 2021-07-02 NOTE — NUTRITION
07/02/21 1521   Recommendations/Interventions   Summary Patient's appetite remains poor, most meal completions 0% noted  Encephalopathy improving today  Nursing skin care plan reviewed; skin remains intact     Interventions Supplement adjust  (Ensure enlive increased to BID and magic cup once daily ordered )   Nutrition Recommendations Other (Specify)  (Suggest advance diet to Level 3 Dysphagia, thin liquid per speech recommendation )

## 2021-07-02 NOTE — CASE MANAGEMENT
Encephalopathy, Pt orientation status is improving  Family will transfer home, Probabley here through the weekend

## 2021-07-02 NOTE — QUICK NOTE
Nurse page stating the patient was very restless continuing to call for help  Patient was seen and examined  She was in bed and perseverative and slightly agitated but otherwise stable  Checked an EKG for baseline QT and started home Seroquel

## 2021-07-02 NOTE — SPEECH THERAPY NOTE
Speech Language/Pathology    Speech/Language Pathology Progress Note    Patient Name: An Pedraza  XRGTW'I Date: 2021     Problem List  Principal Problem:    Encephalopathy  Active Problems:    Chronic pain syndrome    Anxiety    Bipolar II disorder (HCC)    Esophageal reflux    Hypertension    Opioid dependence ([address] )    Tardive dyskinesia    Altered mental status       Past Medical History  Past Medical History:   Diagnosis Date    Acid reflux     Anxiety     RESOLVED: [id number]    Arthritis     Bipolar 2 disorder (HCC)     FOLLOWS WITH PSYCHIATRIST  CONTINUE LAMOTRIGINE; RESOLVED: [id number]    Depression     Familial tremor     both hands    Fibromyalgia     LAST ASSESSED: [id number]    Hearing aid worn     left ear    [place] (hard of hearing)     left ear    Hypertension     Left-sided weakness     Lower back pain     Memory loss of unknown cause     long and short term    Migraine     Obesity     Obesity, Class II, BMI 35-39 9     Overactive bladder     Panic attack     Post traumatic stress disorder     Seasonal allergies     Stroke [facility])     questionable stroke 2009    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    CONT ASA 81 MG DAILY; RESOLVED: [id number]    Urinary incontinence     Wears dentures     partial lower / full upper    Wears glasses         Past Surgical History  Past Surgical History:   Procedure Laterality Date    BACK SURGERY       SECTION      COLONOSCOPY      RESOLVED: [id number]    EAR SURGERY      EGD      HYSTERECTOMY      MYRINGOTOMY W/ TUBES Left     NECK SURGERY  2019    LA CYSTOURETHROSCOPY N/A 2016    Procedure: CYSTOSCOPY, BOTOX INJECTION;  Surgeon: Marcello Min MD;  Location: [facility];  Service: Gynecology    LA IMPLANT SPINAL NEUROSTIM/ Right 2/10/2021    Procedure: REPLACEMENT IMPLANTABLE PULSE GENERATOR DORSAL SPINAL COLUMN STIMULATOR, RIGHT;  Surgeon: Noman Sanchez MD;  [facility]: [facility];  Service: Neurosurgery    SD PERCUT IMPLNT NEUROELECT,EPIDURAL Right 7/28/2020    Procedure: INSERTION THORACIC DORSAL COLUMN SPINAL CORD STIMULATOR PERCUTANEOUS W IMPLANTABLE PULSE GENERATOR, RIGHT;  Surgeon: Noman Sanchez MD;  Location:  MAIN OR;  Service: Neurosurgery    [facility] GASTROINTEST[facility]AL ENDOSCOPY  09/2020         Subjective:  Pt awake and alert, sitting upright in chair  Appeared slightly less agitated today and more responsive to questions  "I don't want it" (referring to food)    Current Diet: level 1 puree/thins  Objective:  Pt seen for ongoing dx dysphagia tx  First, pt ate entire lemon water ice with timely a-p transfer and no overt s/s of aspiration  Pt then trialed with upgraded solid consistencies  Pt initially declined trialing any food options but eventually agreed to trial cookies and pretzels  Ate two pretzels with mildly prolonged yet effective mastication and a-p transfer, and no oral residue  Pt then put entire malgorzata doone cookie in mouth at once, and continued to present with mildly prolonged yet effective mastication and a-p transfer, and no oral residue  Pt is edentulous and reported that she does not have any dentures  Needed continuous support to trial solids throughout session  In between bites, pt took straw sips of thin liquids (iced tea) and did not present with any overt s/s of aspiration  At the end of session, pt refused to trial any more food and complained that her throat hurt  Pt also complained of leg pain during session  Pt continues to present with dyskinesia of tongue but with slight improvement while eating and at rest     Assessment:  Pt continues to tolerate thin liquids with no overt s/s of aspiration  Pt more alert and responsive today - able to tolerate solids with prolonged yet efficient mastication and a-p transfer  Oral cavity was assessed to be clear of oral residue       Plan/Recommendations:  Change diet to level 3/thins  Frequent and thorough oral care  Alternate solid and liquids  Will f/u as able

## 2021-07-02 NOTE — UTILIZATION REVIEW
Continued Stay Review    Date:  7/2/21    Current Patient Class: IP  Current Level of Care: MS    HPI:55 y o  female initially admitted on 6/27 with Encephalopathy probably d/t polypharmacy  CVA was ruled out, has chronic pain syndrome, bipolar, tardive dyskinesia post CVA, opioid dependence, HTN, anxiety  Assessment/Plan:   Mental status is clearing somewhat today  She can formulate some complete sentences and accurately voice complaints  She is less agitated today  Slept well with Seroquel  NO indication for an LP at this time  ST recommending level 1 dysphagia diet, and could tolerate solids continue with pain mgmt for chronic pain  Replete electrolytes  Will resume Ingrezza on 7/3  Using tylenol and Lorazepam PRN today         Vital Signs:   07/02/21 07:25:41  96 9 °F (36 1 °C)Abnormal   82  20  160/96  117  100 %  None (Room air)  --   07/01/21 22:11:38  97 5 °F (36 4 °C)  --  --  160/102Abnormal   121  --  --  --   07/01/21 1925  --  --  --  --  --  --  None (Room air)  --   07/01/21 15:28:07  99 6 °F (37 6 °C)  84  20  132/86  101  98 %  --  --   07/01/21 13:26:46  98 °F (36 7 °C)  81  20  125/85  98  95 %  --  --   07/01/21 10:29:49  --  80  --  150/80  --  100 %  --  --   07/01/21 09:46:01  99 3 °F (37 4 °C)  --  16  201/114Abnormal   143  --  --  --   06/30/21 2320  --  --  --  --  --  --  None (Room air)  --   06/30/21 21:16:27  98 °F (36 7 °C)  --  16  160/98  119  --  --  --   06/30/21 19:08:27  98 9 °F (37 2 °C)  --  --  160/102Abnormal   121  --  --  --   06/30/21 19:06:38  98 9 °F (37 2 °C)  --  18  165/100  122  --  --  --   06/30/21 14:13:19  98 1 °F (36 7 °C)  70  20  153/91  112  97 %  None (Room air)  Lying   06/30/21 11:19:36  98 3 °F (36 8 °C)  70  20  152/94  113  96 %  None (Room air)  Lying   06/30/21 07:15:24  98 7 °F (37 1 °C)  80  22  148/100  116  99 %  --  --   06/30/21 0300  --  --  --  151/86  --  --  --  --   06/30/21 02:52:37  98 8 °F (37 1 °C)  60  18  178/104Abnormal Pertinent Labs/Diagnostic Results:     6/30 US RUQ -  Pancreas obscured by bowel gas  Cholelithiasis without ultrasonographic evidence for cholecystitis  6 mm echogenic nodule left hepatic lobe, possibly hemangioma although not clearly seen previously  Follow-up ultrasound in 6 months recommended to assess size stability  If there is higher level of clinical concern based on patient's history, shorter interval nonemergent outpatient MRI abdomen with IV contrast (Gadavist) could be obtained for further characterization  6/29 CXR, CT head  - no acute disease  7/1 Echo - LEFT VENTRICLE: Size was normal  Systolic function was normal  Ejection fraction was estimated to be 61 %  There were no regional wall motion abnormalities  Wall thickness was at the upper limits of normal  There was no evidence of concentric hypertrophy  DOPPLER: Left ventricular diastolic function parameters were normal   RIGHT VENTRICLE: The size was normal  Systolic function was normal  Wall thickness was normal   LEFT ATRIUM: Size was normal   RIGHT ATRIUM: Size was normal   MITRAL VALVE: Valve structure was normal  There was normal leaflet separation  DOPPLER: The transmitral velocity was within the normal range  There was no evidence for stenosis  There was no regurgitation  AORTIC VALVE: The valve was trileaflet  Leaflets exhibited normal thickness and normal cuspal separation  DOPPLER: Transaortic velocity was within the normal range  There was no evidence for stenosis  There was no regurgitation  TRICUSPID VALVE: The valve structure was normal  There was normal leaflet separation  DOPPLER: The transtricuspid velocity was within the normal range  There was no evidence for stenosis  There was no regurgitation  PULMONIC VALVE: Leaflets exhibited normal thickness, no calcification, and normal cuspal separation  DOPPLER: The transpulmonic velocity was within the normal range  There was no regurgitation    PERICARDIUM: There was no pericardial effusion  The pericardium was normal in appearance    AORTA: The root exhibited normal size      Results from last 7 days   Lab Units 06/27/21  1102   SARS-COV-2  Negative     Results from last 7 days   Lab Units 07/02/21  0753 07/01/21  0559 06/30/21  0557 06/29/21  0930 06/28/21  0609   WBC Thousand/uL 7 56 8 54 9 08 12 81* 11 10*   HEMOGLOBIN g/dL 12 7 14 1 13 7 15 4 15 5*   HEMATOCRIT % 37 8 41 4 40 2 43 7 46 0   PLATELETS Thousands/uL 268 335 302 339 290   NEUTROS ABS Thousands/µL  --  6 48 6 31  --  9 28*         Results from last 7 days   Lab Units 07/02/21  0459 07/01/21  1434 07/01/21  0559 06/30/21  0557 06/29/21  1746 06/29/21  0930   SODIUM mmol/L 137 137 134* 137  --  137   POTASSIUM mmol/L 3 6 3 3* 2 9* 3 1* 3 6 2 7*   CHLORIDE mmol/L 110* 104 103 109*  --  109*   CO2 mmol/L 18* 20* 17* 20*  --  16*   ANION GAP mmol/L 9 13 14* 8  --  12   BUN mg/dL 5 6 5 5  --  5   CREATININE mg/dL 0 54* 0 67 0 49* 0 48*  --  0 61   EGFR ml/min/1 73sq m 121 113 125 126  --  116   CALCIUM mg/dL 8 5 8 7 9 1 8 8  --  8 6   MAGNESIUM mg/dL 2 2  --  1 5*  --   --   --    PHOSPHORUS mg/dL 4 0  --  2 0*  --   --   --      Results from last 7 days   Lab Units 07/02/21  0459 07/01/21  1434 06/30/21  0557 06/29/21  0930 06/28/21  0609 06/27/21  1914 06/27/21  1217   AST U/L 28 18 25 31 30  --  25   ALT U/L 30 31 33 37 35  --  36   ALK PHOS U/L 98 99 98 110 113  --  120*   TOTAL PROTEIN g/dL 7 0 7 2 6 9 7 8 7 7  --  8 1   ALBUMIN g/dL 3 0* 3 4* 3 3* 3 8 3 8  --  4 1   TOTAL BILIRUBIN mg/dL 1 50* 1 70* 1 64* 2 07* 2 44*  --  1 68*   BILIRUBIN DIRECT mg/dL  --  0 47*  --  0 52* 0 52*  --  0 43*   AMMONIA umol/L  --   --   --   --   --  <10*  --          Results from last 7 days   Lab Units 07/02/21  0459 07/01/21  1434 07/01/21  0559 06/30/21  0557 06/29/21  0930 06/28/21  0609 06/27/21  1102   GLUCOSE RANDOM mg/dL 93 108 98 104 113 127 129     Results from last 7 days   Lab Units 07/01/21  1435 06/27/21  1217 PH JAM  7 409* 7 615*   PCO2 JAM mm Hg 32 5* 20 2*   PO2 JAM mm Hg 33 6* 29 5*   HCO3 JAM mmol/L 20 1* 20 1*   BASE EXC JAM mmol/L -3 6 1 6   O2 CONTENT JAM ml/dL 13 0 16 8   O2 HGB, VENOUS % 64 7 71 8         Results from last 7 days   Lab Units 07/01/21  0559   CK TOTAL U/L 338*   CK MB INDEX % 1 4   CK MB ng/mL 4 8     Results from last 7 days   Lab Units 06/27/21  1102   TROPONIN I ng/mL <0 02         Results from last 7 days   Lab Units 06/27/21  1102   PROTIME seconds 12 6   INR  0 94   PTT seconds 30     Results from last 7 days   Lab Units 06/30/21  0557   TSH 3RD GENERATON uIU/mL 2 060     Results from last 7 days   Lab Units 07/02/21  0753 07/01/21  1434   PROCALCITONIN ng/ml <0 05 <0 05     Results from last 7 days   Lab Units 07/01/21  1435 07/01/21  1114 06/29/21  1746   LACTIC ACID mmol/L 1 6 4 3* 0 7     Results from last 7 days   Lab Units 07/01/21  1739 06/27/21  1218   CLARITY UA  Cloudy Clear   COLOR UA  Yellow Yellow   SPEC GRAV UA  1 011 1 015   PH UA  6 0 8 5*   GLUCOSE UA mg/dl Negative Negative   KETONES UA mg/dl >=80 (3+)* Negative   BLOOD UA  Moderate* Trace*   PROTEIN UA mg/dl Negative Negative   NITRITE UA  Negative Negative   BILIRUBIN UA  Negative Negative   UROBILINOGEN UA E U /dl 1 0 0 2   LEUKOCYTES UA  Moderate* Small*   WBC UA /hpf 2-4 4-10*   RBC UA /hpf 4-10* None Seen   BACTERIA UA /hpf Occasional None Seen   EPITHELIAL CELLS WET PREP /hpf None Seen Occasional     Results from last 7 days   Lab Units 06/27/21  1218   AMPH/METH  Negative   BARBITURATE UR  Negative   BENZODIAZEPINE UR  Negative   COCAINE UR  Negative   METHADONE URINE  Negative   OPIATE UR  Positive*   PCP UR  Negative   THC UR  Negative     Results from last 7 days   Lab Units 06/27/21  1217   ETHANOL LVL mg/dL <3   ACETAMINOPHEN LVL ug/mL <2*   SALICYLATE LVL mg/dL <3*     Results from last 7 days   Lab Units 06/28/21  0730   C DIFF TOXIN B BY PCR  Negative     Results from last 7 days   Lab Units 06/28/21  0730   SALMONELLA SP PCR  None Detected   SHIGELLA SP/ENTEROINVASIVE E  COLI (EIEC)  None Detected   CAMPYLOBACTER SP (JEJUNI AND COLI)  None Detected   SHIGA TOXIN 1/SHIGA TOXIN 2  None Detected         Results from last 7 days   Lab Units 07/01/21  1433 06/29/21  1130 06/29/21  1129   BLOOD CULTURE  Received in Microbiology Lab  Culture in Progress  Received in Microbiology Lab  Culture in Progress  No Growth at 48 hrs  No Growth at 48 hrs  Medications:   Scheduled Medications:  [START ON 7/3/2021] amLODIPine, 10 mg, Oral, Daily  ascorbic acid, 500 mg, Oral, BID  aspirin, 81 mg, Oral, Daily  atorvastatin, 40 mg, Oral, Daily With Dinner  cefTRIAXone, 1,000 mg, Intravenous, Q24H  cholecalciferol, 1,000 Units, Oral, Daily  Diclofenac Sodium, 2 g, Topical, 4x Daily  DULoxetine, 60 mg, Oral, Daily  enoxaparin, 40 mg, Subcutaneous, H38F  folic acid, 2,062 mcg, Oral, Daily  glycerin-hypromellose-, 1 drop, Both Eyes, BID  omeprazole (PRILOSEC) suspension 2 mg/mL, 20 mg, Oral, Daily  oxyCODONE, 5 mg, Oral, Q6H  prazosin, 2 mg, Oral, Daily  pregabalin, 100 mg, Oral, Daily  QUEtiapine, 25 mg, Oral, HS      Continuous IV Infusions:  lactated ringers, 100 mL/hr, Intravenous, Continuous      PRN Meds:  acetaminophen, 650 mg, Oral, Q6H PRN - x 1 7/2  Labetalol HCl, 10 mg, Intravenous, Q6H PRN  LORazepam, 1 mg, Intravenous, Q3H PRN - x 2 7/1, x 1 7/2  ondansetron, 4 mg, Intravenous, Q6H PRN - x 1 7/1  oxyCODONE, 5 mg, Oral, Q6H PRN    Discharge Plan: probably home with family    Network Utilization Review Department  ATTENTION: Please call with any questions or concerns to [phone] and carefully listen to the prompts so that you are directed to the right person  All voicemails are confidential   [employer] [employer] all requests for admission clinical reviews, approved or denied determinations and any other requests to dedicated fax number below belonging to the campus where the patient is receiving treatment   List of dedicated fax numbers for the Facilities:  [address] [place] [address] DENIALS (Administrative/Medical Necessity) [phone] [facility]  ([facility]/N[facility]/Pediatrics) [phone] [address] Hilda [address] [id number] [place] [address] [address] [facility] [address] [facility] [phone]

## 2021-07-02 NOTE — PROGRESS NOTES
INTERNAL MEDICINE RESIDENCY PROGRESS NOTE     Name: Dipti Whiting   Age & Sex: 60 y o  female   MRN: [id number]  Unit/Bed#: [facility] 832-01   Encounter: [id number]  Team: SOD Team A    PATIENT INFORMATION     Name: Dipti Whiting   Age & Sex: 60 y o  female   MRN: [id number]  Hospital Stay Days: 5    ASSESSMENT/PLAN     Principal Problem:    Encephalopathy  Active Problems:    Chronic pain syndrome    Anxiety    Bipolar II disorder ([facility] )    Esophageal reflux    Hypertension    Opioid dependence ([facility] )    Tardive dyskinesia    Altered mental status      * Encephalopathy  Assessment & Plan  Patient presented altered, initial concern for polypharmacy, all home medications were stopped on admission, some pain medications restarted on 6/30 due to diminishing concern for polypharmacy as sole cause of encephalopathy  Per her son, the patient woke up on the morning of 6/27 with altered mental status  She was incoherent and could not be redirected  Neurology consult in the emergency department for stroke rule out  CT of the head and CT angiogram of the head and neck negative for any intracranial abnormality  Doubt CVA at this time  Possible anticholinergic toxicity  Patient did not respond to physostigmine as anticipated  Doubt infectious causes with no leukocytosis, negative UA, and no fever  Possible eitiology: toxic metabolic vs  Acute stroke not seen on imaging vs  Infectious vs polypharmacy vs inflammatory      Plan:  · Medication reconciliation done with the patient's son with medications in hand  · Will discontinue a majority of medications including: hydroxyzine 50 mg QHS, nortriptyline 10 mg QHS, trazodone 200 mg QHS, oxybutynin 5 mg BID, pregabalin 225 mg QD, duloxetine 60 mg QD, morphine 15 mg BID, oxycodone 5 QD, Ingrezza 80 mg daily  · According to son, the patient is no longer taking:  Cetirizine 10 mg QD, buspirone 15 mg TID, quetiapine 300 mg QHS (has replaced this with lorazepam 0 5 mg Q6H)  · MRI of brain incompatible with spinal stimulator, will follow up with CT head was negative  · Echo for stroke pathway was normal with EF 61%  · B12 and RPR, HIV, and lyme negative  · Blood cultures negative at 24 hours, hepatic function panel positive for T bill 1 7, follow up T bill 1 5  · RUQ US showed possible 6mm hemangioma, no other abnormality noted  · Lactic acid was 4 3 this morning, repeat blood cultures, procalcitionin, and UA/microscopy + culture were ordered  · Fluids were ordered and repeat lactic was 1 6 at 1400 and procal resulted at <0 05   · UA from 7/1 was positive for ketones, moderate blood and leukocytes and occasional bacteria, she was started on ceftriaxone 1,000mg Q24H for 3 days    · EEG on 6/28: was consistent with moderate generalized non-specific encephalopathy, no electrographic seizures  · Toxicology, neurology and psychiatry consulted, appreciate recommendations  · Urinary retention protocol in place  · Lorazepam IV 1 mg Q3H PRN for agitation  · Home Cymbalta 60 mg daily restarted  · Lyrica 100 mg daily started for pain control  · Patient restarted on Quetiapine 25 mg daily  · Neurochecks every 4 hours  · Gradually wean restraints as tolerated  · Regulate sleep-wake cycle  · Monitor for improvement off medications  · Patient has been evaluated by speech for dysphagia, Level I dysphagia diet recommended and ordered        Chronic pain syndrome  Assessment & Plan  Patient follows with Neurosurgery for chronic lower back pain  She had a thoracolumbar fusion years ago in [place]  She just recently had a nerve stimulator placed in February 2020  Home pain regimen includes oxycodone 5 mg daily, morphine 15 mg twice daily, pregabalin 225 mg daily, duloxetine 60 mg daily      Plan:  · In the setting of encephalopathy possibly secondary to polypharmacy, will hold all these medications at this time  · If the patient's pain persists, may consider introducing other forms of analgesia or slowly introducing some of her home medications  · Patient's mental status slowly improving although she remains aphasic, will restart some pain regimen in the setting of severe chronic pain/opioid withdral  · Home Cymbalta 60 mg daily restarted for pain  · lyrica 100 mg daily started for pain control  · 6/30 oxycodone 5mg oral solution Q6H scheduled  · Voltaren gel started   · Ophthalmic lubricating gel started for eye pain/dryness   · Tylenol 650 mg every 6 hours as needed for pain    Hypokalemia-resolved as of 7/2/2021  Assessment & Plan  Patient noted to have persistent hypokalemia since admission  She has had a few episodes of loose bowel movements since she has been here, etiology of loose bowel movements likely related to opoid withdrawal   Patient continues to be hypokalemia in the setting of reduced PO intake with diarrhea, likely secondary to GI loss  Plan  · Follow up stool studies:  ·  ova and parasite, fecal leukocyte, C  Dif negative, stool enteric panel all negative  · Patient was given ondansetron 4 mg Q6H PRN for nausea  · Recheck K was 2 9 and Mg and phos were 1 5 and 2 0 respectively, repleted  · Continue to monitor BMP, Mg and phos, replete as needed        Anxiety  Assessment & Plan  Patient with an extensive history of anxiety  Recently signed 201 in May 2021 for anxiety attacks  Patient's current home regimen includes nortriptyline 10 mg daily, hydroxyzine 50 mg daily at bedtime, duloxetine 60 mg daily, and lorazepam 0 5 mg every 6 hours  Patient was previously taking buspirone and quetiapine, but since her admission for 201 she has not been taking them according to worse on  Plan:  · Restart pain medications slowly in setting of AMS  · Cymbalta 60 mg daily restarted 6/30  · Quetiapine 25 mg daily restarted 7/1  · patient has lorazepam 1 mg Q3H PRN for agitation    Hypertension  Assessment & Plan  Patient presented hypertensive, resolved without intervention    Blood pressure is currently stable  Plan:  · Amlodipine increased to 10 mg daily  · Continue prazosin 2 mg daily  · Labetalol 10 mg IM PRN for SBP >160  · Monitor blood pressure daily    Tardive dyskinesia  Assessment & Plan  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  Patient taking Ingrezza 80 mg QD  Plan:  · Restart home meds slowly in setting of acute encephalopathy  · Plan to restart ingrezza 80 mg daily tomorrow if mental status is continuing to improve    Opioid dependence [facility])  Assessment & Plan  Patient with a long history of chronic pain and opioid abuse  Current regimen includes oxycodone 5 mg daily and morphine 15 mg twice daily  UDS positive for opioids  Plan:  · Medications held in setting of acute encephalopathy, patient's mental status is improving and she is in significant pain, will add back some pain coverage  · Patient restarted on Oxycodone 5 mg suspension Q6H for pain control/opioid withdrawl    Bipolar II disorder [facility])  Assessment & Plan  Patient previously followed with Psychiatry, last visit in 2018  The patient recently had a stay in a behavior health unit in May 2021 after she signed 201 after coming to the emergency department with panic attacks  According to her son, after that encounter her quetiapine was discontinued and she was given lorazepam 0 5 mg every 6 hours  Her buspirone was also discontinued  Plan:  · Continue to monitor off these medications in the setting of her acute encephalopathy  · Quetiapine 25 mg daily restarted  · Psychiatry consulted, currently unable to assess due to AMS    Esophageal reflux  Assessment & Plan  Currently stable  Home medication includes omeprazole 20 mg daily  Plan:  · Substitute pantoprazole 40 mg daily, omeprazole not on formulary      Disposition: Patient has gradually improving AMS, continue current level of care     SUBJECTIVE     Patient seen and examined   Events overnight significant for some agitation at 2100, she was restarted on Quetiapine 25 mg  Patient was more conversational with me today  She was able to communicate with me that she wanted her restraints off  When asked if she would pull out her IV, she said "no"  She was still somewhat repetitive, stating "my hand" frequently, but she was able to tell me her name on questioning  ROS was still limited in that she said "yes" to all complaints, but she was able to specifically say that her eyes were bothering her  OBJECTIVE     Vitals:    21 1528 21 2211 21 0600 21 0725   BP: 132/86 (!) 160/102  160/96   Pulse: 84   82   Resp: 20   20   Temp: 99 6 °F (37 6 °C) 97 5 °F (36 4 °C)  (!) 96 9 °F (36 1 °C)   TempSrc:       SpO2: 98%   100%   Weight:   99 4 kg (219 lb 2 2 oz)    Height:          Temperature:   Temp (24hrs), Av 3 °F (36 8 °C), Min:96 9 °F (36 1 °C), Max:99 6 °F (37 6 °C)    Temperature: (!) 96 9 °F (36 1 °C)  Intake & Output:  I/O       701 -  07 -  07 -  07    P  O  0 0     I V  (mL/kg) 1000 (9 9) 2033 3 (20 5)     Total Intake(mL/kg) 1000 (9 9) 2033 3 (20 5)     Urine (mL/kg/hr) 1350 (0 6) 2450 (1)     Stool 0 0     Total Output 1350 2450     Net -350 -416 7            Unmeasured Urine Occurrence  2 x     Unmeasured Stool Occurrence 1 x 4 x         Weights:   IBW (Ideal Body Weight): 47 8 kg    Body mass index is 41 41 kg/m²  Weight (last 2 days)     Date/Time   Weight    21 0600   99 4 (219 14)    21 0600   101 (223 77)    21 0559   99 2 (218 7)            Physical Exam  Constitutional:       General: She is not in acute distress  HENT:      Head: Normocephalic and atraumatic  Mouth/Throat:      Mouth: Mucous membranes are moist       Comments: Edentulous   Eyes:      General:         Right eye: No discharge  Left eye: No discharge  Pupils: Pupils are equal, round, and reactive to light        Comments: Some conjunctival erythema, improved from previous  No discharge noted   Cardiovascular:      Rate and Rhythm: Normal rate and regular rhythm  Pulses: Normal pulses  Heart sounds: Normal heart sounds  Pulmonary:      Effort: Pulmonary effort is normal       Breath sounds: Normal breath sounds  Abdominal:      Palpations: Abdomen is soft  Tenderness: There is no abdominal tenderness  There is no guarding or rebound  Musculoskeletal:      Cervical back: Neck supple  No rigidity  Right lower leg: No edema  Left lower leg: No edema  Skin:     General: Skin is warm and dry  Neurological:      Mental Status: She is alert  Comments: Patient was AOx1 for me, only able to state her name,  According to nursing staff she has been able to state her name, that she is in the hospital and that the year is 2021  She was repetative, stating "my hand" and still had some expressive aphasia, but was able to communicate in some short sentences such as "I want to go home"    Moving all 4 limbs equally   Psychiatric:      Comments: Unable to assess due to [address]: I have personally reviewed pertinent reports    Results from last 7 days   Lab Units 07/02/21  0753 07/01/21  0559 06/30/21  0557 06/28/21  0609   WBC Thousand/uL 7 56 8 54 9 08 11 10*   HEMOGLOBIN g/dL 12 7 14 1 13 7 15 5*   HEMATOCRIT % 37 8 41 4 40 2 46 0   PLATELETS Thousands/uL 268 335 302 290   NEUTROS PCT %  --  74 70 83*   MONOS PCT %  --  10 8 6      Results from last 7 days   Lab Units 07/02/21  0459 07/01/21  1434 07/01/21  0559 06/30/21  0557   POTASSIUM mmol/L 3 6 3 3* 2 9* 3 1*   CHLORIDE mmol/L 110* 104 103 109*   CO2 mmol/L 18* 20* 17* 20*   BUN mg/dL 5 6 5 5   CREATININE mg/dL 0 54* 0 67 0 49* 0 48*   CALCIUM mg/dL 8 5 8 7 9 1 8 8   ALK PHOS U/L 98 99  --  98   ALT U/L 30 31  --  33   AST U/L 28 18  --  25     Results from last 7 days   Lab Units 07/02/21  0459 07/01/21  0559   MAGNESIUM mg/dL 2 2 1 5*     Results from last 7 days   Lab Units 07/02/21  0459 07/01/21  0559   PHOSPHORUS mg/dL 4 0 2 0*      Results from last 7 days   Lab Units 06/27/21  1102   INR  0 94   PTT seconds 30     Results from last 7 days   Lab Units 07/01/21  1435   LACTIC ACID mmol/L 1 6     Results from last 7 days   Lab Units 06/27/21  1102   TROPONIN I ng/mL <0 02       IMAGING & DIAGNOSTIC TESTING     Radiology Results: I have personally reviewed pertinent reports  XR chest portable    Result Date: 6/30/2021  Impression: No acute cardiopulmonary disease  Workstation performed: [id number]     CT head wo contrast    Result Date: 6/29/2021  Impression: Grossly stable exam   No acute CT abnormality  Workstation performed: [id number]     CT stroke alert brain    Result Date: 6/27/2021  Impression: No acute intracranial abnormality  Findings were directly discussed with Dr Ely Geller on 6/27/2021 11:27 AM  Workstation performed: [id number]     US right upper quadrant    Result Date: 6/30/2021  Impression: Pancreas obscured by bowel gas  Cholelithiasis without ultrasonographic evidence for cholecystitis  6 mm echogenic nodule left hepatic lobe, possibly hemangioma although not clearly seen previously  Follow-up ultrasound in 6 months recommended to assess size stability  If there is higher level of clinical concern based on patient's history, shorter interval nonemergent outpatient MRI abdomen with IV contrast (Gadavist) could be obtained for further characterization  The study was marked in [facility] for immediate notification and follow-up  Workstation performed: [id number]     CTA stroke alert (head/neck)    Result Date: 6/27/2021  Impression: No significant carotid or vertebral artery stenosis  No focal intracranial stenosis or aneurysm  Findings were directly discussed with Dr Ely Geller on 6/27/2021 11:27 AM  Workstation performed: [id number]     Other Diagnostic Testing: I have personally reviewed pertinent reports      ACTIVE MEDICATIONS     Current Facility-Administered Medications   Medication Dose Route Frequency    acetaminophen (TYLENOL) tablet 650 mg  650 mg Oral Q6H PRN    amLODIPine (NORVASC) tablet 5 mg  5 mg Oral Daily    ascorbic acid (VITAMIN C) tablet 500 mg  500 mg Oral BID    aspirin chewable tablet 81 mg  81 mg Oral Daily    atorvastatin (LIPITOR) tablet 40 mg  40 mg Oral Daily With Dinner    cefTRIAXone (ROCEPHIN) 1,000 mg in dextrose 5 % 50 mL IVPB  1,000 mg Intravenous Q24H    cholecalciferol (VITAMIN D3) tablet 1,000 Units  1,000 Units Oral Daily    Diclofenac Sodium (VOLTAREN) 1 % topical gel 2 g  2 g Topical 4x Daily    DULoxetine (CYMBALTA) delayed release capsule 60 mg  60 mg Oral Daily    enoxaparin (LOVENOX) subcutaneous injection 40 mg  40 mg Subcutaneous C37V    folic acid (FOLVITE) tablet 1,000 mcg  1,000 mcg Oral Daily    Labetalol HCl (NORMODYNE) injection 10 mg  10 mg Intravenous Q6H PRN    lactated ringers infusion  125 mL/hr Intravenous Continuous    LORazepam (ATIVAN) injection 1 mg  1 mg Intravenous Q3H PRN    omeprazole (PRILOSEC) suspension 2 mg/mL  20 mg Oral Daily    ondansetron (ZOFRAN) injection 4 mg  4 mg Intravenous Q6H PRN    oxyCODONE (ROXICODONE) oral solution 5 mg  5 mg Oral Q6H    oxyCODONE (ROXICODONE) oral solution 5 mg  5 mg Oral Q6H PRN    prazosin (MINIPRESS) capsule 2 mg  2 mg Oral Daily    pregabalin (LYRICA) capsule 100 mg  100 mg Oral Daily    QUEtiapine (SEROquel) tablet 25 mg  25 mg Oral HS       VTE Pharmacologic Prophylaxis: Enoxaparin (Lovenox)  VTE Mechanical Prophylaxis: sequential compression device    Portions of the record may have been created with voice recognition software  Occasional wrong word or "sound a like" substitutions may have occurred due to the inherent limitations of voice recognition software    Read the chart carefully and recognize, using context, where substitutions have occurred   ==  [address]  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

## 2021-07-03 LAB
ALBUMIN SERPL BCP-MCNC: 2.6 G/DL (ref 3.5–5)
ALP SERPL-CCNC: 84 U/L (ref 46–116)
ALT SERPL W P-5'-P-CCNC: 25 U/L (ref 12–78)
ANION GAP SERPL CALCULATED.3IONS-SCNC: 7 MMOL/L (ref 4–13)
AST SERPL W P-5'-P-CCNC: 25 U/L (ref 5–45)
BILIRUB SERPL-MCNC: 0.75 MG/DL (ref 0.2–1)
BUN SERPL-MCNC: 5 MG/DL (ref 5–25)
CALCIUM ALBUM COR SERPL-MCNC: 9.5 MG/DL (ref 8.3–10.1)
CALCIUM SERPL-MCNC: 8.4 MG/DL (ref 8.3–10.1)
CHLORIDE SERPL-SCNC: 110 MMOL/L (ref 100–108)
CO2 SERPL-SCNC: 21 MMOL/L (ref 21–32)
CREAT SERPL-MCNC: 0.59 MG/DL (ref 0.6–1.3)
ERYTHROCYTE [DISTWIDTH] IN BLOOD BY AUTOMATED COUNT: 13.6 % (ref 11.6–15.1)
GFR SERPL CREATININE-BSD FRML MDRD: 118 ML/MIN/1.73SQ M
GLUCOSE SERPL-MCNC: 94 MG/DL (ref 65–140)
HCT VFR BLD AUTO: 35.2 % (ref 34.8–46.1)
HGB BLD-MCNC: 11.8 G/DL (ref 11.5–15.4)
MCH RBC QN AUTO: 29.7 PG (ref 26.8–34.3)
MCHC RBC AUTO-ENTMCNC: 33.5 G/DL (ref 31.4–37.4)
MCV RBC AUTO: 89 FL (ref 82–98)
PLATELET # BLD AUTO: 253 THOUSANDS/UL (ref 149–390)
PMV BLD AUTO: 9.7 FL (ref 8.9–12.7)
POTASSIUM SERPL-SCNC: 3.6 MMOL/L (ref 3.5–5.3)
PROT SERPL-MCNC: 6 G/DL (ref 6.4–8.2)
RBC # BLD AUTO: 3.97 MILLION/UL (ref 3.81–5.12)
SODIUM SERPL-SCNC: 138 MMOL/L (ref 136–145)
WBC # BLD AUTO: 5.22 THOUSAND/UL (ref 4.31–10.16)

## 2021-07-03 PROCEDURE — 80053 COMPREHEN METABOLIC PANEL: CPT | Performed by: STUDENT IN AN ORGANIZED HEALTH CARE EDUCATION/TRAINING PROGRAM

## 2021-07-03 PROCEDURE — 99233 SBSQ HOSP IP/OBS HIGH 50: CPT | Performed by: INTERNAL MEDICINE

## 2021-07-03 PROCEDURE — 85027 COMPLETE CBC AUTOMATED: CPT | Performed by: STUDENT IN AN ORGANIZED HEALTH CARE EDUCATION/TRAINING PROGRAM

## 2021-07-03 RX ORDER — AMLODIPINE BESYLATE 5 MG/1
5 TABLET ORAL DAILY
Status: DISCONTINUED | OUTPATIENT
Start: 2021-07-03 | End: 2021-07-07 | Stop reason: HOSPADM

## 2021-07-03 RX ORDER — OXYCODONE HYDROCHLORIDE 5 MG/1
5 TABLET ORAL EVERY 6 HOURS
Status: DISCONTINUED | OUTPATIENT
Start: 2021-07-03 | End: 2021-07-06

## 2021-07-03 RX ORDER — PANTOPRAZOLE SODIUM 20 MG/1
20 TABLET, DELAYED RELEASE ORAL
Status: DISCONTINUED | OUTPATIENT
Start: 2021-07-04 | End: 2021-07-07 | Stop reason: HOSPADM

## 2021-07-03 RX ORDER — LORAZEPAM 0.5 MG/1
0.5 TABLET ORAL EVERY 8 HOURS PRN
Status: DISCONTINUED | OUTPATIENT
Start: 2021-07-03 | End: 2021-07-07

## 2021-07-03 RX ADMIN — OXYCODONE HYDROCHLORIDE 5 MG: 5 SOLUTION ORAL at 05:54

## 2021-07-03 RX ADMIN — ENOXAPARIN SODIUM 40 MG: 40 INJECTION SUBCUTANEOUS at 21:36

## 2021-07-03 RX ADMIN — DICLOFENAC SODIUM 2 G: 10 GEL TOPICAL at 11:05

## 2021-07-03 RX ADMIN — OXYCODONE HYDROCHLORIDE AND ACETAMINOPHEN 500 MG: 500 TABLET ORAL at 17:11

## 2021-07-03 RX ADMIN — PREGABALIN 100 MG: 100 CAPSULE ORAL at 08:58

## 2021-07-03 RX ADMIN — PRAZOSIN HYDROCHLORIDE 2 MG: 2 CAPSULE ORAL at 08:58

## 2021-07-03 RX ADMIN — FOLIC ACID 1000 MCG: 1 TABLET ORAL at 08:58

## 2021-07-03 RX ADMIN — CEFTRIAXONE SODIUM 1000 MG: 10 INJECTION, POWDER, FOR SOLUTION INTRAVENOUS at 21:36

## 2021-07-03 RX ADMIN — DULOXETINE HYDROCHLORIDE 60 MG: 60 CAPSULE, DELAYED RELEASE ORAL at 08:58

## 2021-07-03 RX ADMIN — ONDANSETRON 4 MG: 2 INJECTION INTRAMUSCULAR; INTRAVENOUS at 21:37

## 2021-07-03 RX ADMIN — OXYCODONE HYDROCHLORIDE 5 MG: 5 TABLET ORAL at 22:34

## 2021-07-03 RX ADMIN — ASPIRIN 81 MG CHEWABLE TABLET 81 MG: 81 TABLET CHEWABLE at 08:58

## 2021-07-03 RX ADMIN — OXYCODONE HYDROCHLORIDE 5 MG: 5 TABLET ORAL at 17:11

## 2021-07-03 RX ADMIN — DICLOFENAC SODIUM 2 G: 10 GEL TOPICAL at 08:58

## 2021-07-03 RX ADMIN — OXYCODONE HYDROCHLORIDE 5 MG: 5 TABLET ORAL at 11:04

## 2021-07-03 RX ADMIN — OXYCODONE HYDROCHLORIDE AND ACETAMINOPHEN 500 MG: 500 TABLET ORAL at 08:58

## 2021-07-03 RX ADMIN — GLYCERIN, HYPROMELLOSE, POLYETHYLENE GLYCOL 1 DROP: .2; .2; 1 LIQUID OPHTHALMIC at 08:58

## 2021-07-03 RX ADMIN — SODIUM CHLORIDE, SODIUM LACTATE, POTASSIUM CHLORIDE, AND CALCIUM CHLORIDE 100 ML/HR: .6; .31; .03; .02 INJECTION, SOLUTION INTRAVENOUS at 05:53

## 2021-07-03 RX ADMIN — QUETIAPINE FUMARATE 25 MG: 25 TABLET ORAL at 21:37

## 2021-07-03 RX ADMIN — AMLODIPINE BESYLATE 5 MG: 5 TABLET ORAL at 08:58

## 2021-07-03 RX ADMIN — Medication 20 MG: at 08:59

## 2021-07-03 RX ADMIN — ATORVASTATIN CALCIUM 40 MG: 40 TABLET, FILM COATED ORAL at 17:11

## 2021-07-03 RX ADMIN — LORAZEPAM 0.5 MG: 0.5 TABLET ORAL at 11:04

## 2021-07-03 RX ADMIN — DICLOFENAC SODIUM 2 G: 10 GEL TOPICAL at 17:13

## 2021-07-03 RX ADMIN — Medication 1000 UNITS: at 08:58

## 2021-07-03 NOTE — PLAN OF CARE
Problem: Potential for Falls  Goal: Patient will remain free of falls  Description: INTERVENTIONS:  - Educate patient/family on patient safety including physical limitations  - Instruct patient to call for assistance with activity   - Consult OT/PT to assist with strengthening/mobility   - Keep Call bell within reach  - Keep bed low and locked with side rails adjusted as appropriate  - Keep care items and personal belongings within reach  - Initiate and maintain comfort rounds  - Make Fall Risk Sign visible to staff  - Offer Toileting every 2 Hours, in advance of need  - Initiate/Maintain bed alarm  - Apply yellow socks and bracelet for high fall risk patients  - Consider moving patient to room near nurses station  Outcome: Progressing     Problem: MOBILITY - ADULT  Goal: Maintain or return to baseline ADL function  Description: INTERVENTIONS:  -  Assess patient's ability to carry out ADLs; assess patient's baseline for ADL function and identify physical deficits which impact ability to perform ADLs (bathing, care of mouth/teeth, toileting, grooming, dressing, etc )  - Assess/evaluate cause of self-care deficits   - Assess range of motion  - Assess patient's mobility; develop plan if impaired  - Assess patient's need for assistive devices and provide as appropriate  - Encourage maximum independence but intervene and supervise when necessary  - Involve family in performance of ADLs  - Assess for home care needs following discharge   - Consider OT consult to assist with ADL evaluation and planning for discharge  - Provide patient education as appropriate  Outcome: Progressing  Goal: Maintains/Returns to pre admission functional level  Description: INTERVENTIONS:  - Perform BMAT or MOVE assessment daily    - Set and communicate daily mobility goal to care team and patient/family/caregiver     - Collaborate with rehabilitation services on mobility goals if consulted  - Out of bed for toileting  - Record patient progress and toleration of activity level   Outcome: Progressing     Problem: NEUROSENSORY - ADULT  Goal: Achieves stable or improved neurological status  Description: INTERVENTIONS  - Monitor and report changes in neurological status  - Monitor vital signs such as temperature, blood pressure, glucose, and any other labs ordered   - Initiate measures to prevent increased intracranial pressure  - Monitor for seizure activity and implement precautions if appropriate      Outcome: Progressing     Problem: SAFETY ADULT  Goal: Patient will remain free of falls  Description: INTERVENTIONS:  - Educate patient/family on patient safety including physical limitations  - Instruct patient to call for assistance with activity   - Consult OT/PT to assist with strengthening/mobility   - Keep Call bell within reach  - Keep bed low and locked with side rails adjusted as appropriate  - Keep care items and personal belongings within reach  - Initiate and maintain comfort rounds  - Make Fall Risk Sign visible to staff  - Offer Toileting every 2 Hours, in advance of need  - Initiate/Maintain bed alarm  - Apply yellow socks and bracelet for high fall risk patients  - Consider moving patient to room near nurses station  Outcome: Progressing     Problem: DISCHARGE PLANNING  Goal: Discharge to home or other facility with appropriate resources  Description: INTERVENTIONS:  - Identify barriers to discharge w/patient and caregiver  - Arrange for needed discharge resources and transportation as appropriate  - Identify discharge learning needs (meds, wound care, etc )  - Arrange for interpretive services to assist at discharge as needed  - Refer to Case Management Department for coordinating discharge planning if the patient needs post-hospital services based on physician/advanced practitioner order or complex needs related to functional status, cognitive ability, or social support system  Outcome: Progressing     Problem: Knowledge Deficit  Goal: Patient/family/caregiver demonstrates understanding of disease process, treatment plan, medications, and discharge instructions  Description: Complete learning assessment and assess knowledge base  Interventions:  - Provide teaching at level of understanding  - Provide teaching via preferred learning methods  Outcome: Progressing     Problem: Prexisting or High Potential for Compromised Skin Integrity  Goal: Skin integrity is maintained or improved  Description: INTERVENTIONS:  - Identify patients at risk for skin breakdown  - Assess and monitor skin integrity  - Assess and monitor nutrition and hydration status  - Monitor labs   - Assess for incontinence   - Turn and reposition patient  - Assist with mobility/ambulation  - Relieve pressure over bony prominences  - Avoid friction and shearing  - Provide appropriate hygiene as needed including keeping skin clean and dry  - Evaluate need for skin moisturizer/barrier cream  - Collaborate with interdisciplinary team   - Patient/family teaching  - Consider wound care consult   Outcome: Progressing     Problem: Nutrition/Hydration-ADULT  Goal: Nutrient/Hydration intake appropriate for improving, restoring or maintaining nutritional needs  Description: Monitor and assess patient's nutrition/hydration status for malnutrition  Collaborate with interdisciplinary team and initiate plan and interventions as ordered  Monitor patient's weight and dietary intake as ordered or per policy  Utilize nutrition screening tool and intervene as necessary  Determine patient's food preferences and provide high-protein, high-caloric foods as appropriate       INTERVENTIONS:  - Monitor oral intake, urinary output, labs, and treatment plans  - Assess nutrition and hydration status and recommend course of action  - Evaluate amount of meals eaten  - Assist patient with eating if necessary   - Allow adequate time for meals  - Recommend/ encourage appropriate diets, oral nutritional supplements, and vitamin/mineral supplements  - Order, calculate, and assess calorie counts as needed  - Recommend, monitor, and adjust tube feedings and TPN/PPN based on assessed needs  - Assess need for intravenous fluids  - Provide specific nutrition/hydration education as appropriate  - Include patient/family/caregiver in decisions related to nutrition  Outcome: Progressing

## 2021-07-04 LAB
ANION GAP SERPL CALCULATED.3IONS-SCNC: 7 MMOL/L (ref 4–13)
BACTERIA BLD CULT: NORMAL
BACTERIA BLD CULT: NORMAL
BUN SERPL-MCNC: 6 MG/DL (ref 5–25)
CALCIUM SERPL-MCNC: 8.2 MG/DL (ref 8.3–10.1)
CHLORIDE SERPL-SCNC: 108 MMOL/L (ref 100–108)
CO2 SERPL-SCNC: 24 MMOL/L (ref 21–32)
CREAT SERPL-MCNC: 0.57 MG/DL (ref 0.6–1.3)
GFR SERPL CREATININE-BSD FRML MDRD: 119 ML/MIN/1.73SQ M
GLUCOSE SERPL-MCNC: 100 MG/DL (ref 65–140)
POTASSIUM SERPL-SCNC: 3.2 MMOL/L (ref 3.5–5.3)
SODIUM SERPL-SCNC: 139 MMOL/L (ref 136–145)

## 2021-07-04 PROCEDURE — 80048 BASIC METABOLIC PNL TOTAL CA: CPT | Performed by: STUDENT IN AN ORGANIZED HEALTH CARE EDUCATION/TRAINING PROGRAM

## 2021-07-04 PROCEDURE — 99231 SBSQ HOSP IP/OBS SF/LOW 25: CPT | Performed by: INTERNAL MEDICINE

## 2021-07-04 RX ADMIN — OXYCODONE HYDROCHLORIDE AND ACETAMINOPHEN 500 MG: 500 TABLET ORAL at 18:14

## 2021-07-04 RX ADMIN — PREGABALIN 100 MG: 100 CAPSULE ORAL at 10:29

## 2021-07-04 RX ADMIN — PRAZOSIN HYDROCHLORIDE 2 MG: 2 CAPSULE ORAL at 10:29

## 2021-07-04 RX ADMIN — TRIMETHOBENZAMIDE HYDROCHLORIDE 200 MG: 100 INJECTION INTRAMUSCULAR at 10:26

## 2021-07-04 RX ADMIN — ASPIRIN 81 MG CHEWABLE TABLET 81 MG: 81 TABLET CHEWABLE at 10:29

## 2021-07-04 RX ADMIN — VALBENAZINE 80 MG: 80 CAPSULE ORAL at 10:30

## 2021-07-04 RX ADMIN — OXYCODONE HYDROCHLORIDE 5 MG: 5 TABLET ORAL at 10:32

## 2021-07-04 RX ADMIN — ONDANSETRON 4 MG: 2 INJECTION INTRAMUSCULAR; INTRAVENOUS at 07:55

## 2021-07-04 RX ADMIN — DULOXETINE HYDROCHLORIDE 60 MG: 60 CAPSULE, DELAYED RELEASE ORAL at 10:28

## 2021-07-04 RX ADMIN — AMLODIPINE BESYLATE 5 MG: 5 TABLET ORAL at 10:29

## 2021-07-04 RX ADMIN — DICLOFENAC SODIUM 2 G: 10 GEL TOPICAL at 18:16

## 2021-07-04 RX ADMIN — DICLOFENAC SODIUM 2 G: 10 GEL TOPICAL at 10:30

## 2021-07-04 RX ADMIN — ATORVASTATIN CALCIUM 40 MG: 40 TABLET, FILM COATED ORAL at 18:14

## 2021-07-04 RX ADMIN — QUETIAPINE FUMARATE 25 MG: 25 TABLET ORAL at 21:15

## 2021-07-04 RX ADMIN — OXYCODONE HYDROCHLORIDE AND ACETAMINOPHEN 500 MG: 500 TABLET ORAL at 10:29

## 2021-07-04 RX ADMIN — DICLOFENAC SODIUM 2 G: 10 GEL TOPICAL at 21:15

## 2021-07-04 RX ADMIN — ENOXAPARIN SODIUM 40 MG: 40 INJECTION SUBCUTANEOUS at 21:15

## 2021-07-04 RX ADMIN — Medication 1000 UNITS: at 10:29

## 2021-07-04 RX ADMIN — PANTOPRAZOLE SODIUM 20 MG: 20 TABLET, DELAYED RELEASE ORAL at 05:01

## 2021-07-04 RX ADMIN — GLYCERIN, HYPROMELLOSE, POLYETHYLENE GLYCOL 1 DROP: .2; .2; 1 LIQUID OPHTHALMIC at 10:30

## 2021-07-04 RX ADMIN — LORAZEPAM 0.5 MG: 0.5 TABLET ORAL at 06:18

## 2021-07-04 RX ADMIN — GLYCERIN, HYPROMELLOSE, POLYETHYLENE GLYCOL 1 DROP: .2; .2; 1 LIQUID OPHTHALMIC at 21:15

## 2021-07-04 RX ADMIN — FOLIC ACID 1000 MCG: 1 TABLET ORAL at 10:28

## 2021-07-04 RX ADMIN — OXYCODONE HYDROCHLORIDE 5 MG: 5 TABLET ORAL at 18:14

## 2021-07-04 RX ADMIN — OXYCODONE HYDROCHLORIDE 5 MG: 5 TABLET ORAL at 05:01

## 2021-07-04 NOTE — PROGRESS NOTES
INTERNAL MEDICINE RESIDENCY PROGRESS NOTE     Name: Pipo Hairston   Age & Sex: 60 y o  female   MRN: [id number]  Unit/Bed#: [facility] 832-01   Encounter: [id number]  Team: SOD Team A    PATIENT INFORMATION     Name: Pipo Hairston   Age & Sex: 60 y o  female   MRN: [id number]  Hospital Stay Days: 7    ASSESSMENT/PLAN     Principal Problem:    Encephalopathy  Active Problems:    Chronic pain syndrome    Anxiety    Bipolar II disorder ([facility] [address] )    Esophageal reflux    Hypertension    Hypokalemia    Opioid dependence ([facility] )    Tardive dyskinesia      Tardive dyskinesia  Assessment & Plan  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  Patient taking Ingrezza 80 mg QD  Plan:  · Restart home meds slowly in setting of acute encephalopathy  · Continue ingrezza 80 mg daily     Opioid dependence ([facility] )  Assessment & Plan  Patient with a long history of chronic pain and opioid abuse  Current regimen includes oxycodone 5 mg daily and morphine 15 mg twice daily  UDS positive for opioids  Plan:  · Medications held in setting of acute encephalopathy, patient's mental status is improving and she is in significant pain, will add back some pain coverage  · Continue with p o  Oxycodone 5 mg q 6 hours scheduled    Hypokalemia  Assessment & Plan  Patient noted to have persistent hypokalemia since admission  She has had a few episodes of loose bowel movements since she has been here, etiology of loose bowel movements likely related to opoid withdrawal   Patient continues to be hypokalemia in the setting of reduced PO intake with diarrhea, likely secondary to GI loss    K+ 3 2 today, continue to monitor and replete p r n  Hypertension  Assessment & Plan  Patient presented hypertensive, resolved without intervention  Blood pressure is currently stable  Plan:  · Continue with amlodipine 5 mg q d    · Continue prazosin 2 mg daily  · Labetalol 10 mg IM PRN for SBP >160  · Monitor blood pressure daily    Esophageal reflux  Assessment & Plan  · Currently stable  Continue Protonix 20 mg q d  While inpatient    Bipolar II disorder [facility])  Assessment & Plan  Patient previously followed with Psychiatry, last visit in 2018  The patient recently had a stay in a behavior health unit in May 2021 after she signed 201 after coming to the emergency department with panic attacks  According to her son, after that encounter her quetiapine was discontinued and she was given lorazepam 0 5 mg every 6 hours  Her buspirone was also discontinued  Plan:  · Continue to monitor off these medications in the setting of her acute encephalopathy  · Quetiapine 25 mg daily restarted  · Psychiatry consulted, currently unable to assess due to [address]  Patient with an extensive history of anxiety  Recently signed 201 in May 2021 for anxiety attacks  Patient's current home regimen includes nortriptyline 10 mg daily, hydroxyzine 50 mg daily at bedtime, duloxetine 60 mg daily, and lorazepam 0 5 mg every 6 hours  Patient was previously taking buspirone and quetiapine, but since her admission for 201 she has not been taking them according to worse on  Plan:  · Restart pain medications slowly in setting of AMS  · Cymbalta 60 mg daily restarted 6/30  · Quetiapine 25 mg daily restarted 7/1  · patient has lorazepam 1 mg Q3H PRN for agitation    Chronic pain syndrome  Assessment & Plan  Patient follows with Neurosurgery for chronic lower back pain  She had a thoracolumbar fusion years ago in [place]  She just recently had a nerve stimulator placed in February 2020  Home pain regimen includes oxycodone 5 mg daily, morphine 15 mg twice daily, pregabalin 225 mg daily, duloxetine 60 mg daily      Plan:  · In the setting of encephalopathy possibly secondary to polypharmacy, will hold all these medications at this time  · If the patient's pain persists, may consider introducing other forms of analgesia or slowly introducing some of her home medications  · Patient's mental status slowly improving although she remains aphasic, will restart some pain regimen in the setting of severe chronic pain/opioid withdral  · Home Cymbalta 60 mg daily restarted for pain  · lyrica 100 mg daily started for pain control  · 6/30 oxycodone 5mg oral solution Q6H scheduled  · Voltaren gel started   · Ophthalmic lubricating gel started for eye pain/dryness   · Tylenol 650 mg every 6 hours as needed for pain    * Encephalopathy  Assessment & Plan  Patient presented altered, initial concern for polypharmacy, all home medications were stopped on admission, some pain medications restarted on 6/30 due to diminishing concern for polypharmacy as sole cause of encephalopathy  Per her son, the patient woke up on the morning of 6/27 with altered mental status  She was incoherent and could not be redirected  Neurology consult in the emergency department for stroke rule out  CT of the head and CT angiogram of the head and neck negative for any intracranial abnormality  Doubt CVA at this time  Possible anticholinergic toxicity  Patient did not respond to physostigmine as anticipated  Doubt infectious causes with no leukocytosis, negative UA, and no fever       Possible eitiology:  Etiology at this point likely polypharmacy versus toxic metabolic vs  Acute stroke    Plan:  · Medication reconciliation done with the patient's son with medications in hand  · Will discontinue a majority of medications including: hydroxyzine 50 mg QHS, nortriptyline 10 mg QHS, trazodone 200 mg QHS, oxybutynin 5 mg BID, pregabalin 225 mg QD, duloxetine 60 mg QD, morphine 15 mg BID, oxycodone 5 QD, Ingrezza 80 mg daily  · According to son, the patient is no longer taking:  Cetirizine 10 mg QD, buspirone 15 mg TID, quetiapine 300 mg QHS (has replaced this with lorazepam 0 5 mg Q6H)  · MRI of brain incompatible with spinal stimulator, will follow up with CT head was negative  · Echo for stroke pathway was normal with EF 61%  · B12 and RPR, HIV, and lyme negative  · Blood cultures negative at 24 hours, hepatic function panel positive for T bill 1 7, follow up T bill 1 5  · RUQ US showed possible 6mm hemangioma, no other abnormality noted  · EEG on : was consistent with moderate generalized non-specific encephalopathy, no electrographic seizures  · Toxicology, neurology and psychiatry consulted, appreciate recommendations  · Urinary retention protocol in place  · Home Cymbalta 60 mg daily restarted  · Lyrica 100 mg daily started for pain control  · Patient restarted on Quetiapine 25 mg daily  · Pt restarted on PO Ingrezza 80 mg qd, 7/3  Monitor closely for s/e  · Neurochecks every 4 hours  · No longer confused or agitated            Disposition: Continue inpatient management  Continue to slowly add back medications and monitor  SUBJECTIVE     Patient seen and examined  No acute events overnight  Patient was lying in bed feeling well  She notes continued pain in her lower back the patient is chronic due to her history of spinal surgeries  She is alert and oriented x3  Lip-smacking present  Denies any fevers, chills, hallucinations  OBJECTIVE     Vitals:    21 1506 21 2207 21 0600 21 0704   BP: 116/71 115/76  149/99   BP Location:    Right arm   Pulse:    64   Resp: 16 18  18   Temp: 99 4 °F (37 4 °C) 99 9 °F (37 7 °C)  98 6 °F (37 °C)   TempSrc:    Oral   SpO2:    96%   Weight:   98 6 kg (217 lb 6 oz)    Height:          Temperature:   Temp (24hrs), Av 3 °F (37 4 °C), Min:98 6 °F (37 °C), Max:99 9 °F (37 7 °C)    Temperature: 98 6 °F (37 °C)  Intake & Output:  I/O       701 -  07 07 -  07 07 -  0700    P  O  840 440 360    I V  (mL/kg) 3207 9 (32 3) 1130 (11 5)     Total Intake(mL/kg) 4047 9 (40 8) 1570 (15 9) 360 (3 7)    Urine (mL/kg/hr) 1300 (0 5) 200 (0 1) 975 (2)    Stool 0      Total Output 1300 200 975    Net +2747 9 +1370 -[phone]           Unmeasured Urine Occurrence 1 x 5 x 1 x    Unmeasured Stool Occurrence 1 x 3 x         Weights:   IBW (Ideal Body Weight): 47 8 kg    Body mass index is 41 07 kg/m²  Weight (last 2 days)     Date/Time   Weight    07/04/21 0600   98 6 (217 37)    07/03/21 0449   99 3 (218 92)    07/02/21 0600   99 4 (219 14)            Physical Exam  LABORATORY DATA     Labs: I have personally reviewed pertinent reports  Results from last 7 days   Lab Units 07/03/21  0448 07/02/21  0753 07/01/21  0559 06/30/21  0557 06/28/21  0609   WBC Thousand/uL 5 22 7 56 8 54 9 08 11 10*   HEMOGLOBIN g/dL 11 8 12 7 14 1 13 7 15 5*   HEMATOCRIT % 35 2 37 8 41 4 40 2 46 0   PLATELETS Thousands/uL 253 268 335 302 290   NEUTROS PCT %  --   --  74 70 83*   MONOS PCT %  --   --  10 8 6      Results from last 7 days   Lab Units 07/04/21  0526 07/03/21  0448 07/02/21  0459 07/01/21  1434   POTASSIUM mmol/L 3 2* 3 6 3 6 3 3*   CHLORIDE mmol/L 108 110* 110* 104   CO2 mmol/L 24 21 18* 20*   BUN mg/dL 6 5 5 6   CREATININE mg/dL 0 57* 0 59* 0 54* 0 67   CALCIUM mg/dL 8 2* 8 4 8 5 8 7   ALK PHOS U/L  --  84 98 99   ALT U/L  --  25 30 31   AST U/L  --  25 28 18     Results from last 7 days   Lab Units 07/02/21  0459 07/01/21  0559   MAGNESIUM mg/dL 2 2 1 5*     Results from last 7 days   Lab Units 07/02/21  0459 07/01/21  0559   PHOSPHORUS mg/dL 4 0 2 0*          Results from last 7 days   Lab Units 07/01/21  1435   LACTIC ACID mmol/L 1 6           IMAGING & DIAGNOSTIC TESTING     Radiology Results: I have personally reviewed pertinent reports  XR chest portable    Result Date: 6/30/2021  Impression: No acute cardiopulmonary disease  Workstation performed: [id number]     CT head wo contrast    Result Date: 6/29/2021  Impression: Grossly stable exam   No acute CT abnormality  Workstation performed: [id number]     CT stroke alert brain    Result Date: 6/27/2021  Impression: No acute intracranial abnormality  Findings were directly discussed with Dr Paulino Vital on 6/27/2021 11:27 AM  Workstation performed: [id number]     US right upper quadrant    Result Date: 6/30/2021  Impression: Pancreas obscured by bowel gas  Cholelithiasis without ultrasonographic evidence for cholecystitis  6 mm echogenic nodule left hepatic lobe, possibly hemangioma although not clearly seen previously  Follow-up ultrasound in 6 months recommended to assess size stability  If there is higher level of clinical concern based on patient's history, shorter interval nonemergent outpatient MRI abdomen with IV contrast (Gadavist) could be obtained for further characterization  The study was marked in [facility] for immediate notification and follow-up  Workstation performed: [id number]     CTA stroke alert (head/neck)    Result Date: 6/27/2021  Impression: No significant carotid or vertebral artery stenosis  No focal intracranial stenosis or aneurysm  Findings were directly discussed with Dr Paulino Vital on 6/27/2021 11:27 AM  Workstation performed: [id number]     Other Diagnostic Testing: I have personally reviewed pertinent reports      ACTIVE MEDICATIONS     Current Facility-Administered Medications   Medication Dose Route Frequency    acetaminophen (TYLENOL) tablet 650 mg  650 mg Oral Q6H PRN    amLODIPine (NORVASC) tablet 5 mg  5 mg Oral Daily    ascorbic acid (VITAMIN C) tablet 500 mg  500 mg Oral BID    aspirin chewable tablet 81 mg  81 mg Oral Daily    atorvastatin (LIPITOR) tablet 40 mg  40 mg Oral Daily With Dinner    cholecalciferol (VITAMIN D3) tablet 1,000 Units  1,000 Units Oral Daily    Diclofenac Sodium (VOLTAREN) 1 % topical gel 2 g  2 g Topical 4x Daily    DULoxetine (CYMBALTA) delayed release capsule 60 mg  60 mg Oral Daily    enoxaparin (LOVENOX) subcutaneous injection 40 mg  40 mg Subcutaneous T40Q    folic acid (FOLVITE) tablet 1,000 mcg  1,000 mcg Oral Daily    glycerin-hypromellose- (ARTIFICIAL TEARS) ophthalmic solution 1 drop 1 drop Both Eyes BID    Labetalol HCl (NORMODYNE) injection 10 mg  10 mg Intravenous Q6H PRN    LORazepam (ATIVAN) tablet 0 5 mg  0 5 mg Oral Q8H PRN    ondansetron (ZOFRAN) injection 4 mg  4 mg Intravenous Q6H PRN    oxyCODONE (ROXICODONE) IR tablet 5 mg  5 mg Oral Q6H    pantoprazole (PROTONIX) EC tablet 20 mg  20 mg Oral Early Morning    prazosin (MINIPRESS) capsule 2 mg  2 mg Oral Daily    pregabalin (LYRICA) capsule 100 mg  100 mg Oral Daily    QUEtiapine (SEROquel) tablet 25 mg  25 mg Oral HS    Valbenazine Tosylate CAPS 80 mg  80 mg Oral Daily       VTE Pharmacologic Prophylaxis: Enoxaparin (Lovenox)  VTE Mechanical Prophylaxis: sequential compression device    Portions of the record may have been created with voice recognition software  Occasional wrong word or "sound a like" substitutions may have occurred due to the inherent limitations of voice recognition software    Read the chart carefully and recognize, using context, where substitutions have occurred   ==  Gustavo Delgadillo, [address]  Internal Medicine Residency PGY-1

## 2021-07-04 NOTE — PLAN OF CARE
Problem: Potential for Falls  Goal: Patient will remain free of falls  Description: INTERVENTIONS:  - Educate patient/family on patient safety including physical limitations  - Instruct patient to call for assistance with activity   - Consult OT/PT to assist with strengthening/mobility   - Keep Call bell within reach  - Keep bed low and locked with side rails adjusted as appropriate  - Keep care items and personal belongings within reach  - Initiate and maintain comfort rounds  - Make Fall Risk Sign visible to staff  - Offer Toileting every 2 Hours, in advance of need  - Initiate/Maintain alarm  - Obtain necessary fall risk management equipment:   - Apply yellow socks and bracelet for high fall risk patients  - Consider moving patient to room near nurses station  Outcome: Progressing     Problem: MOBILITY - ADULT  Goal: Maintain or return to baseline ADL function  Description: INTERVENTIONS:  -  Assess patient's ability to carry out ADLs; assess patient's baseline for ADL function and identify physical deficits which impact ability to perform ADLs (bathing, care of mouth/teeth, toileting, grooming, dressing, etc )  - Assess/evaluate cause of self-care deficits   - Assess range of motion  - Assess patient's mobility; develop plan if impaired  - Assess patient's need for assistive devices and provide as appropriate  - Encourage maximum independence but intervene and supervise when necessary  - Involve family in performance of ADLs  - Assess for home care needs following discharge   - Consider OT consult to assist with ADL evaluation and planning for discharge  - Provide patient education as appropriate  Outcome: Progressing  Goal: Maintains/Returns to pre admission functional level  Description: INTERVENTIONS:  - Perform BMAT or MOVE assessment daily    - Set and communicate daily mobility goal to care team and patient/family/caregiver     - Collaborate with rehabilitation services on mobility goals if consulted  - Perform Range of Motion 3 times a day  - Reposition patient every 2 hours    - Dangle patient 3 times a day  - Stand patient 2 times a day  - Ambulate patient  times a day  - Out of bed to chair times a day   - Out of bed for meals 3 times a day  - Out of bed for toileting  - Record patient progress and toleration of activity level   Outcome: Progressing     Problem: NEUROSENSORY - ADULT  Goal: Achieves stable or improved neurological status  Description: INTERVENTIONS  - Monitor and report changes in neurological status  - Monitor vital signs such as temperature, blood pressure, glucose, and any other labs ordered   - Initiate measures to prevent increased intracranial pressure  - Monitor for seizure activity and implement precautions if appropriate      Outcome: Progressing     Problem: SAFETY ADULT  Goal: Patient will remain free of falls  Description: INTERVENTIONS:  - Educate patient/family on patient safety including physical limitations  - Instruct patient to call for assistance with activity   - Consult OT/PT to assist with strengthening/mobility   - Keep Call bell within reach  - Keep bed low and locked with side rails adjusted as appropriate  - Keep care items and personal belongings within reach  - Initiate and maintain comfort rounds  - Make Fall Risk Sign visible to staff  - Offer Toileting every 2 Hours, in advance of need  - Initiate/Maintain alarm  - Obtain necessary fall risk management equipment:   - Apply yellow socks and bracelet for high fall risk patients  - Consider moving patient to room near nurses station  Outcome: Progressing     Problem: DISCHARGE PLANNING  Goal: Discharge to home or other facility with appropriate resources  Description: INTERVENTIONS:  - Identify barriers to discharge w/patient and caregiver  - Arrange for needed discharge resources and transportation as appropriate  - Identify discharge learning needs (meds, wound care, etc )  - Arrange for interpretive services to assist at discharge as needed  - Refer to Case Management Department for coordinating discharge planning if the patient needs post-hospital services based on physician/advanced practitioner order or complex needs related to functional status, cognitive ability, or social support system  Outcome: Progressing     Problem: Knowledge Deficit  Goal: Patient/family/caregiver demonstrates understanding of disease process, treatment plan, medications, and discharge instructions  Description: Complete learning assessment and assess knowledge base  Interventions:  - Provide teaching at level of understanding  - Provide teaching via preferred learning methods  Outcome: Progressing     Problem: Prexisting or High Potential for Compromised Skin Integrity  Goal: Skin integrity is maintained or improved  Description: INTERVENTIONS:  - Identify patients at risk for skin breakdown  - Assess and monitor skin integrity  - Assess and monitor nutrition and hydration status  - Monitor labs   - Assess for incontinence   - Turn and reposition patient  - Assist with mobility/ambulation  - Relieve pressure over bony prominences  - Avoid friction and shearing  - Provide appropriate hygiene as needed including keeping skin clean and dry  - Evaluate need for skin moisturizer/barrier cream  - Collaborate with interdisciplinary team   - Patient/family teaching  - Consider wound care consult   Outcome: Progressing     Problem: Nutrition/Hydration-ADULT  Goal: Nutrient/Hydration intake appropriate for improving, restoring or maintaining nutritional needs  Description: Monitor and assess patient's nutrition/hydration status for malnutrition  Collaborate with interdisciplinary team and initiate plan and interventions as ordered  Monitor patient's weight and dietary intake as ordered or per policy  Utilize nutrition screening tool and intervene as necessary   Determine patient's food preferences and provide high-protein, high-caloric foods as appropriate       INTERVENTIONS:  - Monitor oral intake, urinary output, labs, and treatment plans  - Assess nutrition and hydration status and recommend course of action  - Evaluate amount of meals eaten  - Assist patient with eating if necessary   - Allow adequate time for meals  - Recommend/ encourage appropriate diets, oral nutritional supplements, and vitamin/mineral supplements  - Order, calculate, and assess calorie counts as needed  - Recommend, monitor, and adjust tube feedings and TPN/PPN based on assessed needs  - Assess need for intravenous fluids  - Provide specific nutrition/hydration education as appropriate  - Include patient/family/caregiver in decisions related to nutrition  Outcome: Progressing

## 2021-07-05 LAB
ANION GAP SERPL CALCULATED.3IONS-SCNC: 11 MMOL/L (ref 4–13)
BUN SERPL-MCNC: 6 MG/DL (ref 5–25)
CALCIUM SERPL-MCNC: 8.7 MG/DL (ref 8.3–10.1)
CHLORIDE SERPL-SCNC: 105 MMOL/L (ref 100–108)
CO2 SERPL-SCNC: 23 MMOL/L (ref 21–32)
CREAT SERPL-MCNC: 0.61 MG/DL (ref 0.6–1.3)
GFR SERPL CREATININE-BSD FRML MDRD: 116 ML/MIN/1.73SQ M
GLUCOSE SERPL-MCNC: 116 MG/DL (ref 65–140)
MAGNESIUM SERPL-MCNC: 2 MG/DL (ref 1.6–2.6)
PHOSPHATE SERPL-MCNC: 3.6 MG/DL (ref 2.7–4.5)
POTASSIUM SERPL-SCNC: 2.8 MMOL/L (ref 3.5–5.3)
SODIUM SERPL-SCNC: 139 MMOL/L (ref 136–145)

## 2021-07-05 PROCEDURE — 84100 ASSAY OF PHOSPHORUS: CPT | Performed by: STUDENT IN AN ORGANIZED HEALTH CARE EDUCATION/TRAINING PROGRAM

## 2021-07-05 PROCEDURE — 80048 BASIC METABOLIC PNL TOTAL CA: CPT | Performed by: STUDENT IN AN ORGANIZED HEALTH CARE EDUCATION/TRAINING PROGRAM

## 2021-07-05 PROCEDURE — 83735 ASSAY OF MAGNESIUM: CPT | Performed by: STUDENT IN AN ORGANIZED HEALTH CARE EDUCATION/TRAINING PROGRAM

## 2021-07-05 PROCEDURE — 93005 ELECTROCARDIOGRAM TRACING: CPT

## 2021-07-05 RX ORDER — POTASSIUM CHLORIDE 20 MEQ/1
40 TABLET, EXTENDED RELEASE ORAL ONCE
Status: COMPLETED | OUTPATIENT
Start: 2021-07-05 | End: 2021-07-05

## 2021-07-05 RX ORDER — QUETIAPINE FUMARATE 100 MG/1
100 TABLET, FILM COATED ORAL
Status: DISCONTINUED | OUTPATIENT
Start: 2021-07-05 | End: 2021-07-07 | Stop reason: HOSPADM

## 2021-07-05 RX ORDER — PREGABALIN 100 MG/1
100 CAPSULE ORAL 2 TIMES DAILY
Status: DISCONTINUED | OUTPATIENT
Start: 2021-07-05 | End: 2021-07-07 | Stop reason: HOSPADM

## 2021-07-05 RX ORDER — LORAZEPAM 2 MG/ML
0.5 INJECTION INTRAMUSCULAR ONCE
Status: DISCONTINUED | OUTPATIENT
Start: 2021-07-05 | End: 2021-07-06

## 2021-07-05 RX ORDER — POTASSIUM CHLORIDE AND SODIUM CHLORIDE 900; 300 MG/100ML; MG/100ML
100 INJECTION, SOLUTION INTRAVENOUS CONTINUOUS
Status: DISPENSED | OUTPATIENT
Start: 2021-07-05 | End: 2021-07-06

## 2021-07-05 RX ORDER — LORAZEPAM 2 MG/ML
0.5 INJECTION INTRAMUSCULAR ONCE AS NEEDED
Status: COMPLETED | OUTPATIENT
Start: 2021-07-05 | End: 2021-07-05

## 2021-07-05 RX ORDER — LORAZEPAM 2 MG/ML
1 INJECTION INTRAMUSCULAR ONCE AS NEEDED
Status: DISCONTINUED | OUTPATIENT
Start: 2021-07-05 | End: 2021-07-05

## 2021-07-05 RX ORDER — POTASSIUM CHLORIDE 14.9 MG/ML
20 INJECTION INTRAVENOUS
Status: DISCONTINUED | OUTPATIENT
Start: 2021-07-05 | End: 2021-07-05

## 2021-07-05 RX ADMIN — DULOXETINE HYDROCHLORIDE 60 MG: 60 CAPSULE, DELAYED RELEASE ORAL at 08:15

## 2021-07-05 RX ADMIN — PRAZOSIN HYDROCHLORIDE 2 MG: 2 CAPSULE ORAL at 08:16

## 2021-07-05 RX ADMIN — OXYCODONE HYDROCHLORIDE AND ACETAMINOPHEN 500 MG: 500 TABLET ORAL at 08:15

## 2021-07-05 RX ADMIN — LORAZEPAM 0.5 MG: 2 INJECTION INTRAMUSCULAR; INTRAVENOUS at 09:30

## 2021-07-05 RX ADMIN — LORAZEPAM 0.5 MG: 2 INJECTION INTRAMUSCULAR; INTRAVENOUS at 09:04

## 2021-07-05 RX ADMIN — PREGABALIN 100 MG: 100 CAPSULE ORAL at 08:15

## 2021-07-05 RX ADMIN — PANTOPRAZOLE SODIUM 20 MG: 20 TABLET, DELAYED RELEASE ORAL at 05:55

## 2021-07-05 RX ADMIN — ONDANSETRON 4 MG: 2 INJECTION INTRAMUSCULAR; INTRAVENOUS at 06:50

## 2021-07-05 RX ADMIN — ATORVASTATIN CALCIUM 40 MG: 40 TABLET, FILM COATED ORAL at 15:36

## 2021-07-05 RX ADMIN — DICLOFENAC SODIUM 2 G: 10 GEL TOPICAL at 21:55

## 2021-07-05 RX ADMIN — OXYCODONE HYDROCHLORIDE 5 MG: 5 TABLET ORAL at 12:07

## 2021-07-05 RX ADMIN — FOLIC ACID 1000 MCG: 1 TABLET ORAL at 08:15

## 2021-07-05 RX ADMIN — VALBENAZINE 80 MG: 80 CAPSULE ORAL at 08:16

## 2021-07-05 RX ADMIN — OXYCODONE HYDROCHLORIDE AND ACETAMINOPHEN 500 MG: 500 TABLET ORAL at 17:31

## 2021-07-05 RX ADMIN — OXYCODONE HYDROCHLORIDE 5 MG: 5 TABLET ORAL at 21:52

## 2021-07-05 RX ADMIN — LORAZEPAM 0.5 MG: 0.5 TABLET ORAL at 08:15

## 2021-07-05 RX ADMIN — GLYCERIN, HYPROMELLOSE, POLYETHYLENE GLYCOL 1 DROP: .2; .2; 1 LIQUID OPHTHALMIC at 21:55

## 2021-07-05 RX ADMIN — DICLOFENAC SODIUM 2 G: 10 GEL TOPICAL at 08:16

## 2021-07-05 RX ADMIN — POTASSIUM CHLORIDE 40 MEQ: 1500 TABLET, EXTENDED RELEASE ORAL at 15:36

## 2021-07-05 RX ADMIN — DICLOFENAC SODIUM 2 G: 10 GEL TOPICAL at 17:33

## 2021-07-05 RX ADMIN — TRIMETHOBENZAMIDE HYDROCHLORIDE 200 MG: 100 INJECTION INTRAMUSCULAR at 15:49

## 2021-07-05 RX ADMIN — Medication 1000 UNITS: at 08:15

## 2021-07-05 RX ADMIN — ENOXAPARIN SODIUM 40 MG: 40 INJECTION SUBCUTANEOUS at 21:52

## 2021-07-05 RX ADMIN — ASPIRIN 81 MG CHEWABLE TABLET 81 MG: 81 TABLET CHEWABLE at 08:15

## 2021-07-05 RX ADMIN — DICLOFENAC SODIUM 2 G: 10 GEL TOPICAL at 12:08

## 2021-07-05 RX ADMIN — ONDANSETRON 4 MG: 2 INJECTION INTRAMUSCULAR; INTRAVENOUS at 13:03

## 2021-07-05 RX ADMIN — GLYCERIN, HYPROMELLOSE, POLYETHYLENE GLYCOL 1 DROP: .2; .2; 1 LIQUID OPHTHALMIC at 08:16

## 2021-07-05 RX ADMIN — POTASSIUM CHLORIDE AND SODIUM CHLORIDE 100 ML/HR: 900; 300 INJECTION, SOLUTION INTRAVENOUS at 16:51

## 2021-07-05 RX ADMIN — QUETIAPINE FUMARATE 100 MG: 100 TABLET ORAL at 21:52

## 2021-07-05 RX ADMIN — PREGABALIN 100 MG: 100 CAPSULE ORAL at 17:31

## 2021-07-05 RX ADMIN — OXYCODONE HYDROCHLORIDE 5 MG: 5 TABLET ORAL at 16:17

## 2021-07-05 RX ADMIN — MAGNESIUM OXIDE TAB 400 MG (241.3 MG ELEMENTAL MG) 800 MG: 400 (241.3 MG) TAB at 15:36

## 2021-07-05 RX ADMIN — POTASSIUM CHLORIDE 20 MEQ: 14.9 INJECTION, SOLUTION INTRAVENOUS at 15:39

## 2021-07-05 RX ADMIN — LORAZEPAM 0.5 MG: 0.5 TABLET ORAL at 16:17

## 2021-07-05 RX ADMIN — AMLODIPINE BESYLATE 5 MG: 5 TABLET ORAL at 08:15

## 2021-07-05 RX ADMIN — OXYCODONE HYDROCHLORIDE 5 MG: 5 TABLET ORAL at 03:05

## 2021-07-05 NOTE — PROGRESS NOTES
INTERNAL MEDICINE RESIDENCY PROGRESS NOTE     Name: Radha Ness   Age & Sex: 60 y o  female   MRN: [id number]  [facility]   Encounter: [id number]  Team: SOD Team A    PATIENT INFORMATION     Name: Radha Ness   Age & Sex: 60 y o  female   MRN: [id number]  Hospital Stay Days: 8    ASSESSMENT/PLAN     Principal Problem:    Encephalopathy  Active Problems:    Chronic pain syndrome    Hypokalemia    Anxiety    Hypertension    Tardive dyskinesia    Bipolar II disorder ([facility] )    Opioid dependence ([facility] )    Esophageal reflux      * Encephalopathy  Assessment & Plan  Patient presented altered, initial concern for polypharmacy, all home medications were stopped on admission, some home pain/anxiety meds have been added back slowly in the setting of improving mental status  Per her son, the patient woke up on the morning of 6/27 with altered mental status  She was incoherent and could not be redirected  Neurology consult in the emergency department for stroke rule out  CT of the head and CT angiogram of the head and neck negative for any intracranial abnormality  Doubt CVA at this time  Possible anticholinergic toxicity  Patient did not respond to physostigmine as anticipated  Doubt infectious causes with no leukocytosis, negative UA, and no fever       Possible eitiology:  Etiology at this point likely polypharmacy versus toxic metabolic vs  Acute stroke    Plan:  · Medication reconciliation done with the patient's son with medications in hand  · Will discontinue a majority of medications including: hydroxyzine 50 mg QHS, nortriptyline 10 mg QHS, trazodone 200 mg QHS, oxybutynin 5 mg BID, pregabalin 225 mg QD, duloxetine 60 mg QD, morphine 15 mg BID, oxycodone 5 QD, Ingrezza 80 mg daily  · According to son, the patient is no longer taking:  Cetirizine 10 mg QD, buspirone 15 mg TID, quetiapine 300 mg QHS (has replaced this with lorazepam 0 5 mg Q6H)  · MRI of brain incompatible with spinal stimulator, will follow up with CT head was negative  · Echo for stroke pathway was normal with EF 61%  · B12 and RPR, HIV, and lyme negative  · Blood cultures negative at 24 hours, hepatic function panel positive for T bill 1 7, follow up T bill 1 5  · RUQ US showed possible 6mm hemangioma, no other abnormality noted  · EEG on 6/28: was consistent with moderate generalized non-specific encephalopathy, no electrographic seizures  · Toxicology, neurology and psychiatry consulted, appreciate recommendations  · Urinary retention protocol in place  · Home Cymbalta 60 mg daily restarted  · Will increase lyrica to 100 mg BID  · Increased Quetiapine to 100 mg daily   · If patient continues to tolerate medications will add MS contin tomorrow for better pain control   · Pt restarted on PO Ingrezza 80 mg qd, 7/3  Monitor closely for s/e  · Neurochecks every 4 hours  · No longer confused or agitated          Hypokalemia  Assessment & Plan  Patient noted to have persistent hypokalemia since admission  She has had a few episodes of loose bowel movements since she has been here, etiology of loose bowel movements likely related to opoid withdrawal   Patient continues to be hypokalemia in the setting of reduced PO intake with diarrhea, likely secondary to GI loss    K+ 3 2 today, continue to monitor and replete p r n  Chronic pain syndrome  Assessment & Plan  Patient follows with Neurosurgery for chronic lower back pain  She had a thoracolumbar fusion years ago in [place]  She just recently had a nerve stimulator placed in February 2020  Home pain regimen includes oxycodone 5 mg daily, morphine 15 mg twice daily, pregabalin 225 mg daily, duloxetine 60 mg daily      Plan:  · In the setting of encephalopathy possibly secondary to polypharmacy, will hold all these medications at this time  · If the patient's pain persists, may consider introducing other forms of analgesia or slowly introducing some of her home medications  · Patient's mental status slowly improving although she remains aphasic, will restart some pain regimen in the setting of severe chronic pain/opioid withdral  · Home Cymbalta 60 mg daily restarted for pain  · lyrica 100 mg BID started  · 6/30 oxycodone 5mg oral solution Q6H scheduled  · Will change to Ms contin tomorrow if patient continues to tolerate medications without AMS  · Voltaren gel started   · Ophthalmic lubricating gel started for eye pain/dryness   · Tylenol 650 mg every 6 hours as needed for pain    Anxiety  Assessment & Plan  Patient with an extensive history of anxiety  Recently signed 201 in May 2021 for anxiety attacks  Patient's current home regimen includes nortriptyline 10 mg daily, hydroxyzine 50 mg daily at bedtime, duloxetine 60 mg daily, and lorazepam 0 5 mg every 6 hours  Patient was previously taking buspirone and quetiapine, but since her admission for 201 she has not been taking them according to worse on  Plan:  · Restart pain medications slowly in setting of AMS  · Cymbalta 60 mg daily restarted 6/30  · Quetiapine increased to 100 mg daily  · patient has lorazepam 1 mg Q3H PRN for agitation    Hypertension  Assessment & Plan  Patient presented hypertensive, resolved without intervention  Blood pressure is currently stable  Blood pressure spikes likely in the setting of anxiety/pain    Plan:  · Continue with amlodipine 5 mg q d  · Continue prazosin 2 mg daily  · Labetalol 10 mg IM PRN for SBP >160  · Monitor blood pressure daily    Tardive dyskinesia  Assessment & Plan  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  Patient taking Ingrezza 80 mg QD  Plan:  · Restart home meds slowly in setting of acute encephalopathy  · Continue ingrezza 80 mg daily     Opioid dependence ([facility] )  Assessment & Plan  Patient with a long history of chronic pain and opioid abuse  Current regimen includes oxycodone 5 mg daily and morphine 15 mg twice daily    UDS positive for opioids  Plan:  · Medications held in setting of acute encephalopathy, patient's mental status is improving and she is in significant pain, will add back some pain coverage  · Continue with p o  Oxycodone 5 mg q 6 hours scheduled  · Plan to change to MS contin tomorrow if patient does not develop worsening AMS    Bipolar II disorder [facility])  Assessment & Plan  Patient previously followed with Psychiatry, last visit in 2018  The patient recently had a stay in a behavior health unit in May 2021 after she signed 201 after coming to the emergency department with panic attacks  According to her son, after that encounter her quetiapine was discontinued and she was given lorazepam 0 5 mg every 6 hours  Her buspirone was also discontinued  Plan:  · Continue to monitor off these medications in the setting of her acute encephalopathy  · Quetiapine increased to 100 mg daily  · Psychiatry has signed off at this time    Esophageal reflux  Assessment & Plan  · Currently stable  Continue Protonix 20 mg q d  While inpatient      Disposition: Patient is being evaluated for AMS likely in setting of polypharmacy  Continue current level of care     SUBJECTIVE     Patient seen and examined  No acute events overnight  When I initially went in to evaluate her she was lying in bed and complaining of some nausea and lower back pain  At that time she denied any other complaints, specifically any vomiting, abdominal pain, diarrhea or shortness of breath  We were called back about an hour later because the patient was screaming "help" and that she was in pain  At this time she was endorsing severe pain throughout her body as well as nausea and hand numbness  On exam she was writhing in the bed and shaking, repeatedly saying "help me" and hyperventilating  She was given ativan 1 mg IV and the episode resolved      OBJECTIVE     Vitals:    07/04/21 1439 07/04/21 1737 07/04/21 2116 07/05/21 0751   BP: 120/69  130/81 (!) 151/105   BP Location: Right arm      Pulse: (!) 124 84 73    Resp: 20  20 20   Temp: 99 °F (37 2 °C)  99 °F (37 2 °C) 99 3 °F (37 4 °C)   TempSrc: Oral      SpO2: 99%      Weight:       Height:          Temperature:   Temp (24hrs), Av 1 °F (37 3 °C), Min:99 °F (37 2 °C), Max:99 3 °F (37 4 °C)    Temperature: 99 3 °F (37 4 °C)  Intake & Output:  I/O        07 -  07 07 -  07 07 -  0700    P  O  440 1320 380    I V  (mL/kg) 1130 (11 5)      Total Intake(mL/kg) 1570 (15 9) 1320 (13 4) 380 (3 9)    Urine (mL/kg/hr) 200 (0 1) 2225 (0 9)     Stool       Total Output 200 2225     Net +1370 -905 +380           Unmeasured Urine Occurrence 5 x 1 x     Unmeasured Stool Occurrence 3 x          Weights:   IBW (Ideal Body Weight): 47 8 kg    Body mass index is 41 07 kg/m²  Weight (last 2 days)     Date/Time   Weight    21 0600   98 6 (217 37)    21 0449   99 3 (218 92)            Physical Exam  Constitutional:       General: She is in acute distress  Appearance: She is obese  HENT:      Head: Normocephalic and atraumatic  Mouth/Throat:      Mouth: Mucous membranes are moist    Eyes:      Conjunctiva/sclera: Conjunctivae normal    Cardiovascular:      Rate and Rhythm: Normal rate and regular rhythm  Pulses: Normal pulses  Heart sounds: Normal heart sounds  Pulmonary:      Effort: Pulmonary effort is normal       Breath sounds: Normal breath sounds  Abdominal:      General: There is distension  Palpations: Abdomen is soft  Tenderness: There is no abdominal tenderness  Musculoskeletal:      Right lower leg: No edema  Left lower leg: No edema  Skin:     General: Skin is warm and dry  Neurological:      Mental Status: She is alert and oriented to person, place, and time  Psychiatric:      Comments: Patient had an episode of acute distress, likely in the setting of an anxiety attack       LABORATORY DATA     Labs:  I have personally reviewed pertinent reports  Results from last 7 days   Lab Units 07/03/21  0448 07/02/21  0753 07/01/21  0559 06/30/21  0557   WBC Thousand/uL 5 22 7 56 8 54 9 08   HEMOGLOBIN g/dL 11 8 12 7 14 1 13 7   HEMATOCRIT % 35 2 37 8 41 4 40 2   PLATELETS Thousands/uL 253 268 335 302   NEUTROS PCT %  --   --  74 70   MONOS PCT %  --   --  10 8      Results from last 7 days   Lab Units 07/04/21  0526 07/03/21  0448 07/02/21  0459 07/01/21  1434   POTASSIUM mmol/L 3 2* 3 6 3 6 3 3*   CHLORIDE mmol/L 108 110* 110* 104   CO2 mmol/L 24 21 18* 20*   BUN mg/dL 6 5 5 6   CREATININE mg/dL 0 57* 0 59* 0 54* 0 67   CALCIUM mg/dL 8 2* 8 4 8 5 8 7   ALK PHOS U/L  --  84 98 99   ALT U/L  --  25 30 31   AST U/L  --  25 28 18     Results from last 7 days   Lab Units 07/02/21  0459 07/01/21  0559   MAGNESIUM mg/dL 2 2 1 5*     Results from last 7 days   Lab Units 07/02/21  0459 07/01/21  0559   PHOSPHORUS mg/dL 4 0 2 0*          Results from last 7 days   Lab Units 07/01/21  1435   LACTIC ACID mmol/L 1 6           IMAGING & DIAGNOSTIC TESTING     Radiology Results: I have personally reviewed pertinent reports  XR chest portable    Result Date: 6/30/2021  Impression: No acute cardiopulmonary disease  Workstation performed: [id number]     CT head wo contrast    Result Date: 6/29/2021  Impression: Grossly stable exam   No acute CT abnormality  Workstation performed: [id number]     CT stroke alert brain    Result Date: 6/27/2021  Impression: No acute intracranial abnormality  Findings were directly discussed with Dr Joe Hester on 6/27/2021 11:27 AM  Workstation performed: [id number]     US right upper quadrant    Result Date: 6/30/2021  Impression: Pancreas obscured by bowel gas  Cholelithiasis without ultrasonographic evidence for cholecystitis  6 mm echogenic nodule left hepatic lobe, possibly hemangioma although not clearly seen previously  Follow-up ultrasound in 6 months recommended to assess size stability    If there is higher level of clinical concern based on patient's history, shorter interval nonemergent outpatient MRI abdomen with IV contrast (Gadavist) could be obtained for further characterization  The study was marked in [facility] for immediate notification and follow-up  Workstation performed: [id number]     CTA stroke alert (head/neck)    Result Date: 6/27/2021  Impression: No significant carotid or vertebral artery stenosis  No focal intracranial stenosis or aneurysm  Findings were directly discussed with Dr Evonne Le on 6/27/2021 11:27 AM  Workstation performed: [id number]     Other Diagnostic Testing: I have personally reviewed pertinent reports      ACTIVE MEDICATIONS     Current Facility-Administered Medications   Medication Dose Route Frequency    acetaminophen (TYLENOL) tablet 650 mg  650 mg Oral Q6H PRN    amLODIPine (NORVASC) tablet 5 mg  5 mg Oral Daily    ascorbic acid (VITAMIN C) tablet 500 mg  500 mg Oral BID    aspirin chewable tablet 81 mg  81 mg Oral Daily    atorvastatin (LIPITOR) tablet 40 mg  40 mg Oral Daily With Dinner    cholecalciferol (VITAMIN D3) tablet 1,000 Units  1,000 Units Oral Daily    Diclofenac Sodium (VOLTAREN) 1 % topical gel 2 g  2 g Topical 4x Daily    DULoxetine (CYMBALTA) delayed release capsule 60 mg  60 mg Oral Daily    enoxaparin (LOVENOX) subcutaneous injection 40 mg  40 mg Subcutaneous N44P    folic acid (FOLVITE) tablet 1,000 mcg  1,000 mcg Oral Daily    glycerin-hypromellose- (ARTIFICIAL TEARS) ophthalmic solution 1 drop  1 drop Both Eyes BID    Labetalol HCl (NORMODYNE) injection 10 mg  10 mg Intravenous Q6H PRN    LORazepam (ATIVAN) injection 0 5 mg  0 5 mg Intravenous Once    LORazepam (ATIVAN) tablet 0 5 mg  0 5 mg Oral Q8H PRN    ondansetron (ZOFRAN) injection 4 mg  4 mg Intravenous Q6H PRN    oxyCODONE (ROXICODONE) IR tablet 5 mg  5 mg Oral Q6H    pantoprazole (PROTONIX) EC tablet 20 mg  20 mg Oral Early Morning    prazosin (MINIPRESS) capsule 2 mg  2 mg Oral Daily    pregabalin (LYRICA) capsule 100 mg  100 mg Oral BID    QUEtiapine (SEROquel) tablet 100 mg  100 mg Oral HS    Valbenazine Tosylate CAPS 80 mg  80 mg Oral Daily       VTE Pharmacologic Prophylaxis: Enoxaparin (Lovenox)  VTE Mechanical Prophylaxis: sequential compression device    Portions of the record may have been created with voice recognition software  Occasional wrong word or "sound a like" substitutions may have occurred due to the inherent limitations of voice recognition software    Read the chart carefully and recognize, using context, where substitutions have occurred   ==  [address]  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

## 2021-07-05 NOTE — PLAN OF CARE
Problem: Potential for Falls  Goal: Patient will remain free of falls  Description: INTERVENTIONS:  - Educate patient/family on patient safety including physical limitations  - Instruct patient to call for assistance with activity   - Consult OT/PT to assist with strengthening/mobility   - Keep Call bell within reach  - Keep bed low and locked with side rails adjusted as appropriate  - Keep care items and personal belongings within reach  - Initiate and maintain comfort rounds  - Make Fall Risk Sign visible to staff  - Offer Toileting every Hours, in advance of need  - Initiate/Maintain alarm  - Obtain necessary fall risk management equipment:   - Apply yellow socks and bracelet for high fall risk patients  - Consider moving patient to room near nurses station  Outcome: Progressing     Problem: MOBILITY - ADULT  Goal: Maintain or return to baseline ADL function  Description: INTERVENTIONS:  -  Assess patient's ability to carry out ADLs; assess patient's baseline for ADL function and identify physical deficits which impact ability to perform ADLs (bathing, care of mouth/teeth, toileting, grooming, dressing, etc )  - Assess/evaluate cause of self-care deficits   - Assess range of motion  - Assess patient's mobility; develop plan if impaired  - Assess patient's need for assistive devices and provide as appropriate  - Encourage maximum independence but intervene and supervise when necessary  - Involve family in performance of ADLs  - Assess for home care needs following discharge   - Consider OT consult to assist with ADL evaluation and planning for discharge  - Provide patient education as appropriate  Outcome: Progressing  Goal: Maintains/Returns to pre admission functional level  Description: INTERVENTIONS:  - Perform BMAT or MOVE assessment daily    - Set and communicate daily mobility goal to care team and patient/family/caregiver     - Collaborate with rehabilitation services on mobility goals if consulted  - Perform Range of Motion 2 times a day  - Reposition patient every 2 hours    - Dangle patient 2 times a day  - Stand patient 2 times a day  - Ambulate patient 2 times a day  - Out of bed to chair 2 times a day   - Out of bed for meals 2 times a day  - Out of bed for toileting  - Record patient progress and toleration of activity level   Outcome: Progressing     Problem: NEUROSENSORY - ADULT  Goal: Achieves stable or improved neurological status  Description: INTERVENTIONS  - Monitor and report changes in neurological status  - Monitor vital signs such as temperature, blood pressure, glucose, and any other labs ordered   - Initiate measures to prevent increased intracranial pressure  - Monitor for seizure activity and implement precautions if appropriate      Outcome: Progressing     Problem: SAFETY ADULT  Goal: Patient will remain free of falls  Description: INTERVENTIONS:  - Educate patient/family on patient safety including physical limitations  - Instruct patient to call for assistance with activity   - Consult OT/PT to assist with strengthening/mobility   - Keep Call bell within reach  - Keep bed low and locked with side rails adjusted as appropriate  - Keep care items and personal belongings within reach  - Initiate and maintain comfort rounds  - Make Fall Risk Sign visible to staff  - Offer Toileting every 2 Hours, in advance of need  - Initiate/Maintain 2 alarm  - Obtain necessary fall risk management equipment:   - Apply yellow socks and bracelet for high fall risk patients  - Consider moving patient to room near nurses station  Outcome: Progressing     Problem: DISCHARGE PLANNING  Goal: Discharge to home or other facility with appropriate resources  Description: INTERVENTIONS:  - Identify barriers to discharge w/patient and caregiver  - Arrange for needed discharge resources and transportation as appropriate  - Identify discharge learning needs (meds, wound care, etc )  - Arrange for interpretive services to assist at discharge as needed  - Refer to Case Management Department for coordinating discharge planning if the patient needs post-hospital services based on physician/advanced practitioner order or complex needs related to functional status, cognitive ability, or social support system  Outcome: Progressing     Problem: Knowledge Deficit  Goal: Patient/family/caregiver demonstrates understanding of disease process, treatment plan, medications, and discharge instructions  Description: Complete learning assessment and assess knowledge base  Interventions:  - Provide teaching at level of understanding  - Provide teaching via preferred learning methods  Outcome: Progressing     Problem: Prexisting or High Potential for Compromised Skin Integrity  Goal: Skin integrity is maintained or improved  Description: INTERVENTIONS:  - Identify patients at risk for skin breakdown  - Assess and monitor skin integrity  - Assess and monitor nutrition and hydration status  - Monitor labs   - Assess for incontinence   - Turn and reposition patient  - Assist with mobility/ambulation  - Relieve pressure over bony prominences  - Avoid friction and shearing  - Provide appropriate hygiene as needed including keeping skin clean and dry  - Evaluate need for skin moisturizer/barrier cream  - Collaborate with interdisciplinary team   - Patient/family teaching  - Consider wound care consult   Outcome: Progressing     Problem: Nutrition/Hydration-ADULT  Goal: Nutrient/Hydration intake appropriate for improving, restoring or maintaining nutritional needs  Description: Monitor and assess patient's nutrition/hydration status for malnutrition  Collaborate with interdisciplinary team and initiate plan and interventions as ordered  Monitor patient's weight and dietary intake as ordered or per policy  Utilize nutrition screening tool and intervene as necessary   Determine patient's food preferences and provide high-protein, high-caloric foods as appropriate       INTERVENTIONS:  - Monitor oral intake, urinary output, labs, and treatment plans  - Assess nutrition and hydration status and recommend course of action  - Evaluate amount of meals eaten  - Assist patient with eating if necessary   - Allow adequate time for meals  - Recommend/ encourage appropriate diets, oral nutritional supplements, and vitamin/mineral supplements  - Order, calculate, and assess calorie counts as needed  - Recommend, monitor, and adjust tube feedings and TPN/PPN based on assessed needs  - Assess need for intravenous fluids  - Provide specific nutrition/hydration education as appropriate  - Include patient/family/caregiver in decisions related to nutrition  Outcome: Progressing

## 2021-07-06 LAB
ANION GAP SERPL CALCULATED.3IONS-SCNC: 5 MMOL/L (ref 4–13)
ATRIAL RATE: 80 BPM
BACTERIA BLD CULT: NORMAL
BACTERIA BLD CULT: NORMAL
BUN SERPL-MCNC: 6 MG/DL (ref 5–25)
CALCIUM SERPL-MCNC: 8.7 MG/DL (ref 8.3–10.1)
CHLORIDE SERPL-SCNC: 110 MMOL/L (ref 100–108)
CO2 SERPL-SCNC: 24 MMOL/L (ref 21–32)
CREAT SERPL-MCNC: 0.69 MG/DL (ref 0.6–1.3)
GFR SERPL CREATININE-BSD FRML MDRD: 112 ML/MIN/1.73SQ M
GLUCOSE SERPL-MCNC: 91 MG/DL (ref 65–140)
MAGNESIUM SERPL-MCNC: 2.3 MG/DL (ref 1.6–2.6)
P AXIS: 65 DEGREES
PLATELET # BLD AUTO: 299 THOUSANDS/UL (ref 149–390)
PMV BLD AUTO: 9.8 FL (ref 8.9–12.7)
POTASSIUM SERPL-SCNC: 3.5 MMOL/L (ref 3.5–5.3)
PR INTERVAL: 116 MS
QRS AXIS: 73 DEGREES
QRSD INTERVAL: 98 MS
QT INTERVAL: 380 MS
QTC INTERVAL: 438 MS
SODIUM SERPL-SCNC: 139 MMOL/L (ref 136–145)
T WAVE AXIS: -10 DEGREES
VENTRICULAR RATE: 80 BPM

## 2021-07-06 PROCEDURE — 80048 BASIC METABOLIC PNL TOTAL CA: CPT | Performed by: STUDENT IN AN ORGANIZED HEALTH CARE EDUCATION/TRAINING PROGRAM

## 2021-07-06 PROCEDURE — 83735 ASSAY OF MAGNESIUM: CPT | Performed by: STUDENT IN AN ORGANIZED HEALTH CARE EDUCATION/TRAINING PROGRAM

## 2021-07-06 PROCEDURE — 85049 AUTOMATED PLATELET COUNT: CPT | Performed by: STUDENT IN AN ORGANIZED HEALTH CARE EDUCATION/TRAINING PROGRAM

## 2021-07-06 PROCEDURE — 99233 SBSQ HOSP IP/OBS HIGH 50: CPT | Performed by: INTERNAL MEDICINE

## 2021-07-06 PROCEDURE — 97116 GAIT TRAINING THERAPY: CPT

## 2021-07-06 PROCEDURE — 97110 THERAPEUTIC EXERCISES: CPT

## 2021-07-06 PROCEDURE — 93010 ELECTROCARDIOGRAM REPORT: CPT | Performed by: INTERNAL MEDICINE

## 2021-07-06 PROCEDURE — NC001 PR NO CHARGE: Performed by: INTERNAL MEDICINE

## 2021-07-06 RX ORDER — OXYCODONE HYDROCHLORIDE 5 MG/1
5 TABLET ORAL 2 TIMES DAILY PRN
Status: DISCONTINUED | OUTPATIENT
Start: 2021-07-06 | End: 2021-07-07

## 2021-07-06 RX ORDER — POLYETHYLENE GLYCOL 3350 17 G/17G
17 POWDER, FOR SOLUTION ORAL DAILY PRN
Status: DISCONTINUED | OUTPATIENT
Start: 2021-07-06 | End: 2021-07-07 | Stop reason: HOSPADM

## 2021-07-06 RX ORDER — AMOXICILLIN 250 MG
1 CAPSULE ORAL
Status: DISCONTINUED | OUTPATIENT
Start: 2021-07-06 | End: 2021-07-07 | Stop reason: HOSPADM

## 2021-07-06 RX ORDER — METOCLOPRAMIDE HYDROCHLORIDE 5 MG/ML
10 INJECTION INTRAMUSCULAR; INTRAVENOUS EVERY 6 HOURS PRN
Status: DISCONTINUED | OUTPATIENT
Start: 2021-07-06 | End: 2021-07-06

## 2021-07-06 RX ORDER — METOCLOPRAMIDE HYDROCHLORIDE 5 MG/ML
10 INJECTION INTRAMUSCULAR; INTRAVENOUS ONCE
Status: COMPLETED | OUTPATIENT
Start: 2021-07-06 | End: 2021-07-06

## 2021-07-06 RX ORDER — LORAZEPAM 0.5 MG/1
0.5 TABLET ORAL ONCE AS NEEDED
Status: COMPLETED | OUTPATIENT
Start: 2021-07-06 | End: 2021-07-06

## 2021-07-06 RX ORDER — POTASSIUM CHLORIDE 20 MEQ/1
40 TABLET, EXTENDED RELEASE ORAL ONCE
Status: COMPLETED | OUTPATIENT
Start: 2021-07-06 | End: 2021-07-06

## 2021-07-06 RX ORDER — MORPHINE SULFATE 15 MG/1
15 TABLET, FILM COATED, EXTENDED RELEASE ORAL EVERY 12 HOURS SCHEDULED
Status: DISCONTINUED | OUTPATIENT
Start: 2021-07-06 | End: 2021-07-07 | Stop reason: HOSPADM

## 2021-07-06 RX ADMIN — ATORVASTATIN CALCIUM 40 MG: 40 TABLET, FILM COATED ORAL at 17:58

## 2021-07-06 RX ADMIN — OXYCODONE HYDROCHLORIDE AND ACETAMINOPHEN 500 MG: 500 TABLET ORAL at 17:58

## 2021-07-06 RX ADMIN — TRIMETHOBENZAMIDE HYDROCHLORIDE 200 MG: 100 INJECTION INTRAMUSCULAR at 21:02

## 2021-07-06 RX ADMIN — LORAZEPAM 0.5 MG: 0.5 TABLET ORAL at 08:23

## 2021-07-06 RX ADMIN — ASPIRIN 81 MG CHEWABLE TABLET 81 MG: 81 TABLET CHEWABLE at 08:23

## 2021-07-06 RX ADMIN — BUSPIRONE HYDROCHLORIDE 15 MG: 10 TABLET ORAL at 17:58

## 2021-07-06 RX ADMIN — PANTOPRAZOLE SODIUM 20 MG: 20 TABLET, DELAYED RELEASE ORAL at 05:45

## 2021-07-06 RX ADMIN — AMLODIPINE BESYLATE 5 MG: 5 TABLET ORAL at 08:23

## 2021-07-06 RX ADMIN — QUETIAPINE FUMARATE 100 MG: 100 TABLET ORAL at 21:43

## 2021-07-06 RX ADMIN — BUSPIRONE HYDROCHLORIDE 15 MG: 10 TABLET ORAL at 12:27

## 2021-07-06 RX ADMIN — LORAZEPAM 0.5 MG: 0.5 TABLET ORAL at 17:57

## 2021-07-06 RX ADMIN — DULOXETINE HYDROCHLORIDE 60 MG: 60 CAPSULE, DELAYED RELEASE ORAL at 08:23

## 2021-07-06 RX ADMIN — MORPHINE SULFATE 15 MG: 15 TABLET, EXTENDED RELEASE ORAL at 11:43

## 2021-07-06 RX ADMIN — FOLIC ACID 1000 MCG: 1 TABLET ORAL at 08:23

## 2021-07-06 RX ADMIN — MORPHINE SULFATE 15 MG: 15 TABLET, EXTENDED RELEASE ORAL at 21:01

## 2021-07-06 RX ADMIN — GLYCERIN, HYPROMELLOSE, POLYETHYLENE GLYCOL 1 DROP: .2; .2; 1 LIQUID OPHTHALMIC at 08:25

## 2021-07-06 RX ADMIN — GLYCERIN, HYPROMELLOSE, POLYETHYLENE GLYCOL 1 DROP: .2; .2; 1 LIQUID OPHTHALMIC at 21:00

## 2021-07-06 RX ADMIN — LORAZEPAM 0.5 MG: 0.5 TABLET ORAL at 09:22

## 2021-07-06 RX ADMIN — PREGABALIN 100 MG: 100 CAPSULE ORAL at 17:59

## 2021-07-06 RX ADMIN — DICLOFENAC SODIUM 2 G: 10 GEL TOPICAL at 11:43

## 2021-07-06 RX ADMIN — DOCUSATE SODIUM AND SENNOSIDES 1 TABLET: 8.6; 5 TABLET ORAL at 21:43

## 2021-07-06 RX ADMIN — ACETAMINOPHEN 650 MG: 325 TABLET, FILM COATED ORAL at 08:23

## 2021-07-06 RX ADMIN — Medication 1000 UNITS: at 08:23

## 2021-07-06 RX ADMIN — TRIMETHOBENZAMIDE HYDROCHLORIDE 200 MG: 100 INJECTION INTRAMUSCULAR at 08:31

## 2021-07-06 RX ADMIN — OXYCODONE HYDROCHLORIDE AND ACETAMINOPHEN 500 MG: 500 TABLET ORAL at 08:23

## 2021-07-06 RX ADMIN — DICLOFENAC SODIUM 2 G: 10 GEL TOPICAL at 18:00

## 2021-07-06 RX ADMIN — PRAZOSIN HYDROCHLORIDE 2 MG: 2 CAPSULE ORAL at 08:24

## 2021-07-06 RX ADMIN — ENOXAPARIN SODIUM 40 MG: 40 INJECTION SUBCUTANEOUS at 21:01

## 2021-07-06 RX ADMIN — PREGABALIN 100 MG: 100 CAPSULE ORAL at 08:23

## 2021-07-06 RX ADMIN — POTASSIUM CHLORIDE 40 MEQ: 1500 TABLET, EXTENDED RELEASE ORAL at 08:23

## 2021-07-06 RX ADMIN — OXYCODONE HYDROCHLORIDE 5 MG: 5 TABLET ORAL at 05:45

## 2021-07-06 RX ADMIN — BUSPIRONE HYDROCHLORIDE 15 MG: 10 TABLET ORAL at 21:01

## 2021-07-06 RX ADMIN — VALBENAZINE 80 MG: 80 CAPSULE ORAL at 08:24

## 2021-07-06 RX ADMIN — DICLOFENAC SODIUM 2 G: 10 GEL TOPICAL at 08:25

## 2021-07-06 RX ADMIN — METOCLOPRAMIDE 10 MG: 5 INJECTION, SOLUTION INTRAMUSCULAR; INTRAVENOUS at 12:28

## 2021-07-06 NOTE — PLAN OF CARE
Problem: PHYSICAL THERAPY ADULT  Goal: Performs mobility at highest level of function for planned discharge setting  See evaluation for individualized goals  Description: Treatment/Interventions: Functional transfer training, LE strengthening/ROM, Therapeutic exercise, Endurance training, Bed mobility, Gait training  Equipment Recommended:  (TBD)       See flowsheet documentation for full assessment, interventions and recommendations  7/6/2021 1353 by Manuel Crowder PT  Outcome: Progressing  Note: Prognosis: Fair  Problem List: Decreased strength, Decreased endurance, Impaired balance, Decreased mobility, Decreased cognition, Decreased safety awareness, Pain  Assessment: Pt continues to be limited by pain and self reported anxiety, improved overall cognition  Pt states she has chronic back pain and new R ankle pain, no tenderness to palpation or AROM limitations, RN aware  Pt with limited standing tolerance, required close chair follow for seated rest breaks, refused sitting upright in bedside chair at end of session stating fatigue  Pt agreeable to LE exercise, required cues to attend to task  Pt will benefit from continued skilled PT intervention during course of hospital stay to improve strength, endurance and balance to improve safety with functional mobility  At current functional level, pt unable to ambulate household distance safety, recommend IP rehab upon hospital D/C  Barriers to Discharge: Inaccessible home environment, Decreased caregiver support        PT Discharge Recommendation: Post acute rehabilitation services     PT - OK to Discharge: Yes (to IP rehab)    See flowsheet documentation for full assessment

## 2021-07-06 NOTE — PHYSICAL THERAPY NOTE
Physical Therapy Treatment Note    Patient's Name: Adrián Sandoval  : 1965 1[birth year]   PT Last Visit   PT Visit Date 21   Note Type   Note Type Treatment   Pain Assessment   Pain Assessment Tool 0-10   Pain Score 7   Pain Location/Orientation Orientation: Right;Location: Foot  (pt states she "twisted" her ankle yesterday)   Hospital Pain Intervention(s) Repositioned; Ambulation/increased activity; Elevated   Restrictions/Precautions   Other Precautions Cognitive; Bed Alarm; Fall Risk;Pain; Impulsive   General   Chart Reviewed Yes   Family/Caregiver Present No   Cognition   Overall Cognitive Status Impaired   Arousal/Participation Alert; Cooperative   Attention Attends with cues to redirect   Orientation Level Oriented X4   Following Commands Follows one step commands with increased time or repetition   Comments Pt oriented and easily re-directed to task this session, pt states she still feels "not clear" mentally but significantly improved   Bed Mobility   Supine to Sit 4  Minimal assistance   Additional items Assist x 1;HOB elevated; Increased time required;Verbal cues   Transfers   Sit to Stand 4  Minimal assistance   Additional items Assist x 1; Increased time required;Verbal cues   Stand to Sit 4  Minimal assistance   Additional items Assist x 1; Increased time required;Verbal cues   Additional Comments with pt's rollator   Ambulation/Elevation   Gait pattern Decreased foot clearance; Forward Flexion; Excessively slow; Short stride   Gait Assistance 4  Minimal assist   Additional items Assist x 1  (+ chair follow)   Assistive Device Other (Comment)  (rollator)   Distance 10 ft x2, seated rest break between trials, VC's for proximity to rollator, safety awareness, increased step length  Pt easily fatigued      Balance   Static Sitting Fair   Dynamic Sitting Fair -   Static Standing Poor +   Dynamic Standing Poor +   Ambulatory Poor + Activity Tolerance   Activity Tolerance Patient limited by pain  (and self reported anxiety)   Nurse Made Aware RN updated  Bed alarm engaged   Exercises   Heelslides Supine;10 reps;AAROM; Bilateral   Hip Abduction Supine;10 reps;AAROM; Bilateral   Knee AROM Long Arc Quad Sitting;10 reps;AROM; Bilateral   Ankle Pumps Sitting;10 reps;AROM; Bilateral   Assessment   Prognosis Fair   Problem List Decreased strength;Decreased endurance; Impaired balance;Decreased mobility; Decreased cognition;Decreased safety awareness;Pain   Assessment Pt continues to be limited by pain and self reported anxiety, improved overall cognition  Pt states she has chronic back pain and new R ankle pain, no tenderness to palpation or AROM limitations, RN aware  Pt with limited standing tolerance, required close chair follow for seated rest breaks, refused sitting upright in bedside chair at end of session stating fatigue  Pt agreeable to LE exercise, required cues to attend to task  Pt will benefit from continued skilled PT intervention during course of hospital stay to improve strength, endurance and balance to improve safety with functional mobility  At current functional level, pt unable to ambulate household distance safety, recommend IP rehab upon hospital D/C  Goals   Patient Goals to go home   STG Expiration Date 07/12/21   PT Treatment Day 2   Plan   Treatment/Interventions Functional transfer training;LE strengthening/ROM; Therapeutic exercise; Endurance training;Patient/family training;Equipment eval/education; Bed mobility;Gait training;Cognitive reorientation   Progress Progressing toward goals   PT Frequency 2-3x/wk   Recommendation   PT Discharge Recommendation Post acute rehabilitation services   PT - OK to Discharge Yes  (to IP rehab)   [place] 8 in Bed Without Bedrails 3   Lying on Back to Sitting on Edge of Flat Bed 3   Moving Bed to Chair 3   Standing Up From Chair 3   Walk in Room 3   Climb 3-5 Stairs 2   Basic Mobility Inpatient Raw Score 17   Basic Mobility Standardized Score 39 67       Rock Hung, PT, DPT

## 2021-07-06 NOTE — PROGRESS NOTES
INTERNAL MEDICINE RESIDENCY PROGRESS NOTE     Name: Nubia Serrano   Age & Sex: 60 y o  female   MRN: [id number]  Unit/Bed#: [facility] 832-01   Encounter: [id number]  Team: SOD Team A    PATIENT INFORMATION     Name: Nubia Serrano   Age & Sex: 60 y o  female   MRN: [id number]  Hospital Stay Days: 9    ASSESSMENT/PLAN     Principal Problem:    Encephalopathy  Active Problems:    Chronic pain syndrome    Anxiety    Bipolar II disorder ([facility] )    Esophageal reflux    Hypertension    Hypokalemia    Opioid dependence ([facility] )    Tardive dyskinesia      * Encephalopathy  Assessment & Plan  Patient presented altered, initial concern for polypharmacy, all home medications were stopped on admission, some home pain/anxiety meds have been added back slowly in the setting of improving mental status  Per her son, the patient woke up on the morning of 6/27 with altered mental status  She was incoherent and could not be redirected  Neurology consult in the emergency department for stroke rule out  CT of the head and CT angiogram of the head and neck negative for any intracranial abnormality  Doubt CVA at this time  Possible anticholinergic toxicity  Patient did not respond to physostigmine as anticipated  Doubt infectious causes with no leukocytosis, negative UA, and no fever  Possible eitiology:  Etiology at this point likely polypharmacy versus toxic metabolic vs  Acute stroke    Patient is continuing to improve    Will slowly add back home meds to control pain and anxiety    Plan:  · Medication reconciliation done with the patient's son with medications in hand  · Will discontinue a majority of medications including: hydroxyzine 50 mg QHS, nortriptyline 10 mg QHS, trazodone 200 mg QHS, oxybutynin 5 mg BID, pregabalin 225 mg QD, duloxetine 60 mg QD, morphine 15 mg BID, oxycodone 5 QD, Ingrezza 80 mg daily  · According to son, the patient is no longer taking:  Cetirizine 10 mg QD, buspirone 15 mg TID, quetiapine 300 mg QHS (has replaced this with lorazepam 0 5 mg Q6H)  · MRI of brain incompatible with spinal stimulator, will follow up with CT head was negative  · Echo for stroke pathway was normal with EF 61%  · B12 and RPR, HIV, and lyme negative  · Blood cultures negative at 24 hours, hepatic function panel positive for T bill 1 7, follow up T bill 1 5  · RUQ US showed possible 6mm hemangioma, no other abnormality noted  · EEG on 6/28: was consistent with moderate generalized non-specific encephalopathy, no electrographic seizures  · Toxicology, neurology and psychiatry consulted, appreciate recommendations  · Urinary retention protocol in place  · Home Cymbalta 60 mg daily restarted  · Lyrica increased to 100 mg BID  · Quetiapine increased to 100 mg daily   · Patient started on home regiment of MsContin 15 mg Q12H for pain control  · Home Buspar 15 mg TID restarted for anxiety   · Pt restarted on PO Ingrezza 80 mg qd, 7/3  Monitor closely for s/e  · Neurochecks every 4 hours  · No longer confused or agitated          Tardive dyskinesia  Assessment & Plan  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  Patient taking Ingrezza 80 mg QD  Plan:  · Restart home meds slowly in setting of acute encephalopathy  · Continue ingrezza 80 mg daily     Opioid dependence ([facility] )  Assessment & Plan  Patient with a long history of chronic pain and opioid abuse  Current regimen includes oxycodone 5 mg daily and morphine 15 mg twice daily  UDS positive for opioids  Plan:  · Medications held in setting of acute encephalopathy, patient's mental status is improving and she is in significant pain, will add back some pain coverage  · Patient restarted on MsContin 15 mg Q12H  · Oxycodone 5 mg BID PRN  · Plan to change to MS contin tomorrow if patient does not develop worsening AMS    Hypokalemia  Assessment & Plan  Patient noted to have persistent hypokalemia since admission    She has had a few episodes of loose bowel movements since she has been here, etiology of loose bowel movements likely related to opoid withdrawal   Patient continues to be hypokalemia in the setting of reduced PO intake with diarrhea, likely secondary to GI loss    K+ 3 5 today, continue to monitor and replete p r n  Hypertension  Assessment & Plan  Patient presented hypertensive, resolved without intervention  Blood pressure is currently stable  Blood pressure spikes likely in the setting of anxiety/pain    Plan:  · Continue with amlodipine 5 mg q d  · Continue prazosin 2 mg daily  · Labetalol 10 mg IM PRN for SBP >160  · Monitor blood pressure daily    Esophageal reflux  Assessment & Plan  · Currently stable  Continue Protonix 20 mg q d  While inpatient    Bipolar II disorder [facility])  Assessment & Plan  Patient previously followed with Psychiatry, last visit in 2018  The patient recently had a stay in a behavior health unit in May 2021 after she signed 201 after coming to the emergency department with panic attacks  According to her son, after that encounter her quetiapine was discontinued and she was given lorazepam 0 5 mg every 6 hours  Her buspirone was also discontinued  Plan:  · Continue to monitor off these medications in the setting of her acute encephalopathy  · Quetiapine increased to 100 mg daily  · Psychiatry has signed off at this time    [address]  Patient with an extensive history of anxiety  Recently signed 201 in May 2021 for anxiety attacks  Patient's current home regimen includes nortriptyline 10 mg daily, hydroxyzine 50 mg daily at bedtime, duloxetine 60 mg daily, and lorazepam 0 5 mg every 6 hours  Patient was previously taking buspirone and quetiapine, but since her admission for 201 she has not been taking them according to worse on       Plan:  · Restart pain medications slowly in setting of AMS  · Cymbalta 60 mg daily restarted 6/30  · Quetiapine increased to 100 mg daily  · Home buspar 15 mg TID restarted  · patient has lorazepam 1 mg Q3H PRN for agitation    Chronic pain syndrome  Assessment & Plan  Patient follows with Neurosurgery for chronic lower back pain  She had a thoracolumbar fusion years ago in [place]  She just recently had a nerve stimulator placed in February 2020  Home pain regimen includes oxycodone 5 mg daily, morphine 15 mg twice daily, pregabalin 225 mg daily, duloxetine 60 mg daily  Plan:  · In the setting of encephalopathy possibly secondary to polypharmacy, will hold all these medications at this time  · If the patient's pain persists, may consider introducing other forms of analgesia or slowly introducing some of her home medications  · Patient's mental status slowly improving although she remains aphasic, will restart some pain regimen in the setting of severe chronic pain/opioid withdral  · Home Cymbalta 60 mg daily restarted for pain  · lyrica 100 mg BID started  · 6/30 oxycodone 5mg oral solution Q6H scheduled  · Patient started on home MsContin 15 mg Q12H for pain control  · OxyContin changed to 5 mg BID PRN  · Voltaren gel started   · Ophthalmic lubricating gel started for eye pain/dryness   · Tylenol 650 mg every 6 hours as needed for pain      Disposition: Continue monitoring for AMS and current level of care    SUBJECTIVE     Patient seen and examined  No acute events overnight  Patient appeared much improved since her episode yesterday 7/5  She was AOx3 and able to hold a conversation  She reported mild anxiety but says its tolerable with her PRN ativan  She also reported improvement of pain since yesterday, now only noting some chronic pain in her lower back and lower abdomen which she rated to be 5/10  She complained of constipation but does not want to take Murelax right now  She denied nausea and vomiting since starting trimethobenzamide 200mg yesterday  She also denied any SOB, fever, or chills       OBJECTIVE     Vitals:    07/05/21 1537 07/05/21 2153 07/06/21 0553 21 0724   BP: 139/86 109/59  133/92   Pulse:  70  79   Resp: 15 16  20   Temp: 99 7 °F (37 6 °C) 98 4 °F (36 9 °C)  98 7 °F (37 1 °C)   TempSrc:       SpO2:    99%   Weight:   97 6 kg (215 lb 3 2 oz)    Height:          Temperature:   Temp (24hrs), Av 9 °F (37 2 °C), Min:98 4 °F (36 9 °C), Max:99 7 °F (37 6 °C)    Temperature: 98 7 °F (37 1 °C)  Intake & Output:  I/O        07 -  07 -  07 -  07    P  O  1320 620     I V  (mL/kg)  1000 (10 2)     Total Intake(mL/kg) 1320 (13 4) 1620 (16 6)     Urine (mL/kg/hr) 2225 (0 9) 1650 (0 7)     Total Output 2225 1650     Net -905 -30            Unmeasured Urine Occurrence 1 x  2 x    Unmeasured Stool Occurrence   1 x        Weights:   IBW (Ideal Body Weight): 47 8 kg    Body mass index is 40 66 kg/m²  Weight (last 2 days)     Date/Time   Weight    21 0553   97 6 (215 2)    21 0600   98 6 (217 37)            Physical Exam  Constitutional:       General: She is not in acute distress  Appearance: Normal appearance  She is obese  She is not ill-appearing, toxic-appearing or diaphoretic  HENT:      Head: Normocephalic and atraumatic  Cardiovascular:      Rate and Rhythm: Normal rate and regular rhythm  Pulses: Normal pulses  Heart sounds: Normal heart sounds  No murmur heard  No friction rub  No gallop  Pulmonary:      Effort: Pulmonary effort is normal  No respiratory distress  Breath sounds: Normal breath sounds  No stridor  No wheezing, rhonchi or rales  Chest:      Chest wall: No tenderness  Abdominal:      General: Bowel sounds are normal  There is no distension  Palpations: Abdomen is soft  There is no mass  Tenderness: There is abdominal tenderness (lower abdomen)  There is no right CVA tenderness, left CVA tenderness, guarding or rebound  Hernia: No hernia is present     Musculoskeletal:         General: No swelling, tenderness, deformity or signs of injury  Normal range of motion  Cervical back: Normal range of motion and neck supple  Right lower leg: No edema  Left lower leg: No edema  Skin:     General: Skin is warm and dry  Coloration: Skin is not jaundiced or pale  Findings: No bruising, erythema, lesion or rash  Neurological:      Mental Status: She is alert and oriented to person, place, and time  Comments: Patient seemed oriented with more insight into her medical condition  Judgement was fair  Psychiatric:         Mood and Affect: Mood normal          Behavior: Behavior normal        LABORATORY DATA     Labs: I have personally reviewed pertinent reports  Results from last 7 days   Lab Units 07/06/21  0558 07/03/21  0448 07/02/21  0753 07/01/21  0559 06/30/21  0557   WBC Thousand/uL  --  5 22 7 56 8 54 9 08   HEMOGLOBIN g/dL  --  11 8 12 7 14 1 13 7   HEMATOCRIT %  --  35 2 37 8 41 4 40 2   PLATELETS Thousands/uL 299 253 268 335 302   NEUTROS PCT %  --   --   --  74 70   MONOS PCT %  --   --   --  10 8      Results from last 7 days   Lab Units 07/06/21  0558 07/05/21 1217 07/04/21  0526 07/03/21  0448 07/02/21  0459 07/01/21  1434   POTASSIUM mmol/L 3 5 2 8* 3 2* 3 6 3 6 3 3*   CHLORIDE mmol/L 110* 105 108 110* 110* 104   CO2 mmol/L 24 23 24 21 18* 20*   BUN mg/dL 6 6 6 5 5 6   CREATININE mg/dL 0 69 0 61 0 57* 0 59* 0 54* 0 67   CALCIUM mg/dL 8 7 8 7 8 2* 8 4 8 5 8 7   ALK PHOS U/L  --   --   --  84 98 99   ALT U/L  --   --   --  25 30 31   AST U/L  --   --   --  25 28 18     Results from last 7 days   Lab Units 07/06/21 0558 07/05/21 1217 07/02/21  0459   MAGNESIUM mg/dL 2 3 2 0 2 2     Results from last 7 days   Lab Units 07/05/21 1217 07/02/21  0459 07/01/21  0559   PHOSPHORUS mg/dL 3 6 4 0 2 0*          Results from last 7 days   Lab Units 07/01/21  1435   LACTIC ACID mmol/L 1 6           IMAGING & DIAGNOSTIC TESTING     Radiology Results: I have personally reviewed pertinent reports    XR chest portable    Result Date: 6/30/2021  Impression: No acute cardiopulmonary disease  Workstation performed: [id number]     CT head wo contrast    Result Date: 6/29/2021  Impression: Grossly stable exam   No acute CT abnormality  Workstation performed: [id number]     CT stroke alert brain    Result Date: 6/27/2021  Impression: No acute intracranial abnormality  Findings were directly discussed with Dr Angelo Barrera on 6/27/2021 11:27 AM  Workstation performed: [id number]     US right upper quadrant    Result Date: 6/30/2021  Impression: Pancreas obscured by bowel gas  Cholelithiasis without ultrasonographic evidence for cholecystitis  6 mm echogenic nodule left hepatic lobe, possibly hemangioma although not clearly seen previously  Follow-up ultrasound in 6 months recommended to assess size stability  If there is higher level of clinical concern based on patient's history, shorter interval nonemergent outpatient MRI abdomen with IV contrast (Gadavist) could be obtained for further characterization  The study was marked in [facility] for immediate notification and follow-up  Workstation performed: [id number]     CTA stroke alert (head/neck)    Result Date: 6/27/2021  Impression: No significant carotid or vertebral artery stenosis  No focal intracranial stenosis or aneurysm  Findings were directly discussed with Dr Angelo Barrera on 6/27/2021 11:27 AM  Workstation performed: [id number]     Other Diagnostic Testing: I have personally reviewed pertinent reports      ACTIVE MEDICATIONS     Current Facility-Administered Medications   Medication Dose Route Frequency    acetaminophen (TYLENOL) tablet 650 mg  650 mg Oral Q6H PRN    amLODIPine (NORVASC) tablet 5 mg  5 mg Oral Daily    ascorbic acid (VITAMIN C) tablet 500 mg  500 mg Oral BID    aspirin chewable tablet 81 mg  81 mg Oral Daily    atorvastatin (LIPITOR) tablet 40 mg  40 mg Oral Daily With Dinner    busPIRone (BUSPAR) tablet 15 mg  15 mg Oral TID    cholecalciferol (VITAMIN D3) tablet 1,000 Units  1,000 Units Oral Daily    Diclofenac Sodium (VOLTAREN) 1 % topical gel 2 g  2 g Topical 4x Daily    DULoxetine (CYMBALTA) delayed release capsule 60 mg  60 mg Oral Daily    enoxaparin (LOVENOX) subcutaneous injection 40 mg  40 mg Subcutaneous M86S    folic acid (FOLVITE) tablet 1,000 mcg  1,000 mcg Oral Daily    glycerin-hypromellose- (ARTIFICIAL TEARS) ophthalmic solution 1 drop  1 drop Both Eyes BID    Labetalol HCl (NORMODYNE) injection 10 mg  10 mg Intravenous Q6H PRN    LORazepam (ATIVAN) tablet 0 5 mg  0 5 mg Oral Q8H PRN    morphine (MS CONTIN) ER tablet 15 mg  15 mg Oral Q12H KESHIA    oxyCODONE (ROXICODONE) IR tablet 5 mg  5 mg Oral BID PRN    pantoprazole (PROTONIX) EC tablet 20 mg  20 mg Oral Early Morning    polyethylene glycol (MIRALAX) packet 17 g  17 g Oral Daily PRN    prazosin (MINIPRESS) capsule 2 mg  2 mg Oral Daily    pregabalin (LYRICA) capsule 100 mg  100 mg Oral BID    QUEtiapine (SEROquel) tablet 100 mg  100 mg Oral HS    senna-docusate sodium (SENOKOT S) 8 6-50 mg per tablet 1 tablet  1 tablet Oral HS    trimethobenzamide (TIGAN) IM injection 200 mg  200 mg Intramuscular Q6H PRN    Valbenazine Tosylate CAPS 80 mg  80 mg Oral Daily       VTE Pharmacologic Prophylaxis: Enoxaparin (Lovenox)  VTE Mechanical Prophylaxis: sequential compression device    Portions of the record may have been created with voice recognition software  Occasional wrong word or "sound a like" substitutions may have occurred due to the inherent limitations of voice recognition software    Read the chart carefully and recognize, using context, where substitutions have occurred   ==  Jeaneth [address]     [place], [place]

## 2021-07-07 VITALS
RESPIRATION RATE: 22 BRPM | TEMPERATURE: 98.6 F | HEIGHT: 61 IN | SYSTOLIC BLOOD PRESSURE: 123 MMHG | BODY MASS INDEX: 40.71 KG/M2 | DIASTOLIC BLOOD PRESSURE: 87 MMHG | WEIGHT: 215.61 LBS | HEART RATE: 81 BPM | OXYGEN SATURATION: 100 %

## 2021-07-07 LAB
ANION GAP SERPL CALCULATED.3IONS-SCNC: 6 MMOL/L (ref 4–13)
BUN SERPL-MCNC: 6 MG/DL (ref 5–25)
CALCIUM SERPL-MCNC: 8.6 MG/DL (ref 8.3–10.1)
CHLORIDE SERPL-SCNC: 107 MMOL/L (ref 100–108)
CO2 SERPL-SCNC: 27 MMOL/L (ref 21–32)
CREAT SERPL-MCNC: 0.72 MG/DL (ref 0.6–1.3)
GFR SERPL CREATININE-BSD FRML MDRD: 108 ML/MIN/1.73SQ M
GLUCOSE SERPL-MCNC: 127 MG/DL (ref 65–140)
POTASSIUM SERPL-SCNC: 3.5 MMOL/L (ref 3.5–5.3)
SODIUM SERPL-SCNC: 140 MMOL/L (ref 136–145)

## 2021-07-07 PROCEDURE — 80048 BASIC METABOLIC PNL TOTAL CA: CPT | Performed by: STUDENT IN AN ORGANIZED HEALTH CARE EDUCATION/TRAINING PROGRAM

## 2021-07-07 PROCEDURE — 99239 HOSP IP/OBS DSCHRG MGMT >30: CPT | Performed by: INTERNAL MEDICINE

## 2021-07-07 RX ORDER — ONDANSETRON 4 MG/1
4 TABLET, ORALLY DISINTEGRATING ORAL EVERY 6 HOURS PRN
Status: DISCONTINUED | OUTPATIENT
Start: 2021-07-07 | End: 2021-07-07 | Stop reason: HOSPADM

## 2021-07-07 RX ORDER — AMOXICILLIN 250 MG
1 CAPSULE ORAL
Qty: 30 TABLET | Refills: 0 | Status: ON HOLD | OUTPATIENT
Start: 2021-07-07 | End: 2021-07-14 | Stop reason: CLARIF

## 2021-07-07 RX ORDER — ASPIRIN 81 MG/1
81 TABLET, CHEWABLE ORAL DAILY
Qty: 30 TABLET | Refills: 0 | Status: ON HOLD | OUTPATIENT
Start: 2021-07-08 | End: 2021-08-02

## 2021-07-07 RX ORDER — LORAZEPAM 0.5 MG/1
0.5 TABLET ORAL EVERY 8 HOURS SCHEDULED
Status: DISCONTINUED | OUTPATIENT
Start: 2021-07-07 | End: 2021-07-07 | Stop reason: HOSPADM

## 2021-07-07 RX ORDER — PREGABALIN 100 MG/1
100 CAPSULE ORAL 2 TIMES DAILY
Qty: 60 CAPSULE | Refills: 1 | Status: SHIPPED | OUTPATIENT
Start: 2021-07-07 | End: 2021-11-10 | Stop reason: SDUPTHER

## 2021-07-07 RX ORDER — PANTOPRAZOLE SODIUM 20 MG/1
20 TABLET, DELAYED RELEASE ORAL
Qty: 30 TABLET | Refills: 0 | Status: ON HOLD | OUTPATIENT
Start: 2021-07-08 | End: 2021-08-02

## 2021-07-07 RX ORDER — POTASSIUM CHLORIDE 750 MG/1
20 CAPSULE, EXTENDED RELEASE ORAL DAILY
Qty: 60 CAPSULE | Refills: 0 | Status: SHIPPED | OUTPATIENT
Start: 2021-07-07 | End: 2021-07-08

## 2021-07-07 RX ORDER — LORAZEPAM 0.5 MG/1
0.5 TABLET ORAL EVERY 6 HOURS
Refills: 0 | Status: ON HOLD
Start: 2021-07-07 | End: 2021-07-14 | Stop reason: CLARIF

## 2021-07-07 RX ORDER — PREGABALIN 100 MG/1
100 CAPSULE ORAL 2 TIMES DAILY
Qty: 20 CAPSULE | Refills: 0 | Status: CANCELLED | OUTPATIENT
Start: 2021-07-07 | End: 2021-07-17

## 2021-07-07 RX ORDER — ATORVASTATIN CALCIUM 40 MG/1
40 TABLET, FILM COATED ORAL
Qty: 30 TABLET | Refills: 0 | Status: ON HOLD | OUTPATIENT
Start: 2021-07-07 | End: 2021-08-02

## 2021-07-07 RX ORDER — LORAZEPAM 0.5 MG/1
0.5 TABLET ORAL EVERY 8 HOURS SCHEDULED
Status: DISCONTINUED | OUTPATIENT
Start: 2021-07-07 | End: 2021-07-07

## 2021-07-07 RX ORDER — QUETIAPINE FUMARATE 100 MG/1
100 TABLET, FILM COATED ORAL
Qty: 30 TABLET | Refills: 0 | Status: ON HOLD | OUTPATIENT
Start: 2021-07-07 | End: 2021-08-02

## 2021-07-07 RX ORDER — BUSPIRONE HYDROCHLORIDE 15 MG/1
15 TABLET ORAL 3 TIMES DAILY
Qty: 90 TABLET | Refills: 0 | Status: ON HOLD | OUTPATIENT
Start: 2021-07-07 | End: 2021-08-02

## 2021-07-07 RX ORDER — OXYCODONE HYDROCHLORIDE 5 MG/1
5 TABLET ORAL DAILY PRN
Status: DISCONTINUED | OUTPATIENT
Start: 2021-07-07 | End: 2021-07-07 | Stop reason: HOSPADM

## 2021-07-07 RX ADMIN — DULOXETINE HYDROCHLORIDE 60 MG: 60 CAPSULE, DELAYED RELEASE ORAL at 08:59

## 2021-07-07 RX ADMIN — PREGABALIN 100 MG: 100 CAPSULE ORAL at 09:00

## 2021-07-07 RX ADMIN — GLYCERIN, HYPROMELLOSE, POLYETHYLENE GLYCOL 1 DROP: .2; .2; 1 LIQUID OPHTHALMIC at 09:01

## 2021-07-07 RX ADMIN — LORAZEPAM 0.5 MG: 0.5 TABLET ORAL at 05:25

## 2021-07-07 RX ADMIN — DICLOFENAC SODIUM 2 G: 10 GEL TOPICAL at 11:40

## 2021-07-07 RX ADMIN — LORAZEPAM 0.5 MG: 0.5 TABLET ORAL at 12:17

## 2021-07-07 RX ADMIN — MORPHINE SULFATE 15 MG: 15 TABLET, EXTENDED RELEASE ORAL at 08:59

## 2021-07-07 RX ADMIN — ATORVASTATIN CALCIUM 40 MG: 40 TABLET, FILM COATED ORAL at 15:59

## 2021-07-07 RX ADMIN — BUSPIRONE HYDROCHLORIDE 15 MG: 10 TABLET ORAL at 15:58

## 2021-07-07 RX ADMIN — OXYCODONE HYDROCHLORIDE AND ACETAMINOPHEN 500 MG: 500 TABLET ORAL at 08:59

## 2021-07-07 RX ADMIN — PANTOPRAZOLE SODIUM 20 MG: 20 TABLET, DELAYED RELEASE ORAL at 05:25

## 2021-07-07 RX ADMIN — ONDANSETRON 4 MG: 4 TABLET, ORALLY DISINTEGRATING ORAL at 09:31

## 2021-07-07 RX ADMIN — PRAZOSIN HYDROCHLORIDE 2 MG: 2 CAPSULE ORAL at 08:58

## 2021-07-07 RX ADMIN — ASPIRIN 81 MG CHEWABLE TABLET 81 MG: 81 TABLET CHEWABLE at 08:59

## 2021-07-07 RX ADMIN — VALBENAZINE 80 MG: 80 CAPSULE ORAL at 08:58

## 2021-07-07 RX ADMIN — DICLOFENAC SODIUM 2 G: 10 GEL TOPICAL at 09:01

## 2021-07-07 RX ADMIN — OXYCODONE HYDROCHLORIDE 5 MG: 5 TABLET ORAL at 11:39

## 2021-07-07 RX ADMIN — FOLIC ACID 1000 MCG: 1 TABLET ORAL at 09:00

## 2021-07-07 RX ADMIN — Medication 1000 UNITS: at 09:00

## 2021-07-07 RX ADMIN — BUSPIRONE HYDROCHLORIDE 15 MG: 10 TABLET ORAL at 08:59

## 2021-07-07 RX ADMIN — AMLODIPINE BESYLATE 5 MG: 5 TABLET ORAL at 08:59

## 2021-07-07 NOTE — CASE MANAGEMENT
Patient to go home with [facility] SN/PT/OT/HHA  Patient and son Rohan Mandels aware

## 2021-07-07 NOTE — DISCHARGE INSTRUCTIONS
Please follow up with  [facility] for PCP within next 7-14 days  Please continue to take all meds as prescribed  Please complete BMP in 1 week  Anxiety   WHAT YOU SHOULD KNOW:   Anxiety is a condition that causes you to feel excessive worry, uneasiness, or fear  Family or work stress, smoking, caffeine, and alcohol can increase your risk for anxiety  Certain medicines or health conditions can also increase your risk  Anxiety may begin gradually, and can become a long-term condition if it is not managed or treated  AFTER YOU LEAVE:   Medicines:   · Medicines  can help you feel more calm and relaxed, and decrease your symptoms  · Take your medicine as directed  Contact your healthcare provider if you think your medicine is not helping or if you have side effects  Tell him if you are allergic to any medicine  Keep a list of the medicines, vitamins, and herbs you take  Include the amounts, and when and why you take them  Bring the list or the pill bottles to follow-up visits  Carry your medicine list with you in case of an emergency  Follow up with your healthcare provider within 2 weeks or as directed:  Write down your questions so you remember to ask them during your visits  Manage anxiety:   · Go to counseling as directed  Cognitive behavioral therapy can help you understand and change how you react to events that trigger your symptoms  · Find ways to manage your symptoms  Activities such as exercise, meditation, or listening to music can help you relax  · Practice deep breathing  Breathing can change how your body reacts to stress  Focus on taking slow, deep breaths several times a day, or during an anxiety attack  Breathe in through your nose, and out through your mouth  · Avoid caffeine  Caffeine can make your symptoms worse  Avoid foods or drinks that are meant to increase your energy level  · Limit or avoid alcohol    Ask your healthcare provider if alcohol is safe for you  You may not be able to drink alcohol if you take certain anxiety or depression medicines  Limit alcohol to 1 drink per day if you are a woman  Limit alcohol to 2 drinks per day if you are a man  A drink of alcohol is 12 ounces of beer, 5 ounces of wine, or 1½ ounces of liquor  Contact your healthcare provider if:   · Your symptoms get worse or do not get better with treatment  · You think your medicine may be causing side effects  · Your anxiety keeps you from doing your regular daily activities  · You have new symptoms since your last visit  · You have questions or concerns about your condition or care  Seek care immediately or call 911 if:   · You have chest pain, tightness, or heaviness that may spread to your shoulders, arms, jaw, neck, or back  · You feel like hurting yourself or someone else  · You feel dizzy, lightheaded, or faint  © 2014 [address] [employer] is for End User's use only and may not be sold, redistributed or otherwise used for commercial purposes  All illustrations and images included in CareNotes® are the copyrighted property of [facility] , [employer]  or Mateusz Rand  The above information is an  only  It is not intended as medical advice for individual conditions or treatments  Talk to your doctor, nurse or pharmacist before following any medical regimen to see if it is safe and effective for you

## 2021-07-07 NOTE — CASE MANAGEMENT
Met with the patient in her room regarding going to STR, pt refuses to go to STR but is agreeable to [facility] and mentioned she had [facility] in the past about would like discussed with her son PEDRITO    Call placed to ros Rodrigues and he agreed to referral be made to [facility] for SN/PT/OT/HHA  Son pedrito did not have a preference to an agency and said to go with what his mother wants  [facility]erral made to [facility] for SN/PT/OT/HHA, awaiting response for [facility] 7/8 or 7/9       Ros Rodrigues will  today between 2-3 pm

## 2021-07-07 NOTE — DISCHARGE SUMMARY
INTERNAL MEDICINE RESIDENCY DISCHARGE SUMMARY     Samantha Suggs   60 y o  female  MRN: [id number]  Room/Bed: [place] [id number]/[facility] [address] [place] [address]   Encounter: [id number]    Principal Problem:    Encephalopathy  Active Problems:    Chronic pain syndrome    Anxiety    Bipolar II disorder (HCC)    Esophageal reflux    Hypertension    Hypokalemia    Opioid dependence ([address] )    Tardive dyskinesia      * Encephalopathy  Assessment & Plan  Patient presented altered, initial concern for polypharmacy, all home medications were stopped on admission, some home pain/anxiety meds have been added back slowly in the setting of improving mental status  Per her son, the patient woke up on the morning of 6/27 with altered mental status  She was incoherent and could not be redirected  Neurology consult in the emergency department for stroke rule out  CT of the head and CT angiogram of the head and neck negative for any intracranial abnormality  Doubt CVA at this time  Possible anticholinergic toxicity  Patient did not respond to physostigmine as anticipated  Doubt infectious causes with no leukocytosis, negative UA, and no fever  Possible eitiology:  Etiology at this point likely polypharmacy versus toxic metabolic vs  Acute stroke    Patient is continuing to improve    Will slowly add back home meds to control pain and anxiety    Plan:  · Medication reconciliation done with the patient's son with medications in hand  · Will discontinue a majority of medications including: hydroxyzine 50 mg QHS, nortriptyline 10 mg QHS, trazodone 200 mg QHS, oxybutynin 5 mg BID, pregabalin 225 mg QD, duloxetine 60 mg QD, morphine 15 mg BID, oxycodone 5 QD, Ingrezza 80 mg daily  · According to son, the patient is no longer taking:  Cetirizine 10 mg QD, buspirone 15 mg TID, quetiapine 300 mg QHS (has replaced this with lorazepam 0 5 mg Q6H)  · MRI of brain incompatible with spinal stimulator, will follow up with CT head was negative  · Echo for stroke pathway was normal with EF 61%  · B12 and RPR, HIV, and lyme negative  · Blood cultures negative at 24 hours, hepatic function panel positive for T bill 1 7, follow up T bill 1 5  · RUQ US showed possible 6mm hemangioma, no other abnormality noted  · EEG on 6/28: was consistent with moderate generalized non-specific encephalopathy, no electrographic seizures  · Toxicology, neurology and psychiatry consulted, appreciate recommendations  · Urinary retention protocol in place  · Home Cymbalta 60 mg daily restarted  · Lyrica increased to 100 mg BID  · Quetiapine increased to 100 mg daily   · Patient started on home regiment of MsContin 15 mg Q12H for pain control  · Home Buspar 15 mg TID restarted for anxiety   · Pt restarted on PO Ingrezza 80 mg qd, 7/3  Monitor closely for s/e  · Neurochecks every 4 hours  · No longer confused or agitated          Hypokalemia  Assessment & Plan  Patient noted to have persistent hypokalemia since admission  She has had a few episodes of loose bowel movements since she has been here, etiology of loose bowel movements likely related to opoid withdrawal   Patient continues to be hypokalemia in the setting of reduced PO intake with diarrhea, likely secondary to GI loss    Patient to be discharged on 20 mEq K, follow up outpatient [facility]    K+ 3 5 today, continue to monitor and replete p r n  Chronic pain syndrome  Assessment & Plan  Patient follows with Neurosurgery for chronic lower back pain  She had a thoracolumbar fusion years ago in [place]  She just recently had a nerve stimulator placed in February 2020  Home pain regimen includes oxycodone 5 mg daily, morphine 15 mg twice daily, pregabalin 225 mg daily, duloxetine 60 mg daily      Plan:  · In the setting of encephalopathy possibly secondary to polypharmacy, will hold all these medications at this time  · If the patient's pain persists, may consider introducing other forms of analgesia or slowly introducing some of her home medications  · Patient's mental status slowly improving although she remains aphasic, will restart some pain regimen in the setting of severe chronic pain/opioid withdral  · Home Cymbalta 60 mg daily restarted for pain  · lyrica 100 mg BID started  · 6/30 oxycodone 5mg oral solution Q6H scheduled  · Patient started on home MsContin 15 mg Q12H for pain control  · OxyContin changed to 5 mg daily PRN  · Voltaren gel started   · Ophthalmic lubricating gel started for eye pain/dryness   · Tylenol 650 mg every 6 hours as needed for pain    Anxiety  Assessment & Plan  Patient with an extensive history of anxiety  Recently signed 201 in May 2021 for anxiety attacks  Patient's current home regimen includes nortriptyline 10 mg daily, hydroxyzine 50 mg daily at bedtime, duloxetine 60 mg daily, and lorazepam 0 5 mg every 6 hours  Patient was previously taking buspirone and quetiapine, but since her admission for 201 she has not been taking them according to worse on  Plan:  · Restart pain medications slowly in setting of AMS  · Cymbalta 60 mg daily restarted 6/30  · Quetiapine increased to 100 mg daily  · Home buspar 15 mg TID restarted  · patient has lorazepam 0 5 Q8H scheduled     Hypertension  Assessment & Plan  Patient presented hypertensive, resolved without intervention  Blood pressure is currently stable  Blood pressure spikes likely in the setting of anxiety/pain    Plan:  · Continue with amlodipine 5 mg q d  · Continue prazosin 2 mg daily  · Labetalol 10 mg IM PRN for SBP >160  · Monitor blood pressure daily    Tardive dyskinesia  Assessment & Plan  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  Patient taking Ingrezza 80 mg QD      Plan:  · Restart home meds slowly in setting of acute encephalopathy  · Continue ingrezza 80 mg daily     Opioid dependence ([facility] [address] )  Assessment & Plan  Patient with a long history of chronic pain and opioid abuse  Current regimen includes oxycodone 5 mg daily and morphine 15 mg twice daily  UDS positive for opioids  Plan:  · Medications held in setting of acute encephalopathy, patient's mental status is improving and she is in significant pain, will add back some pain coverage  · Patient restarted on MsContin 15 mg Q12H  · Oxycodone 5 mg daily PRN  · Plan to change to MS contin tomorrow if patient does not develop worsening AMS    Bipolar II disorder [facility])  Assessment & Plan  Patient previously followed with Psychiatry, last visit in 2018  The patient recently had a stay in a behavior health unit in May 2021 after she signed 201 after coming to the emergency department with panic attacks  According to her son, after that encounter her quetiapine was discontinued and she was given lorazepam 0 5 mg every 6 hours  Her buspirone was also discontinued  Plan:  · Continue to monitor off these medications in the setting of her acute encephalopathy  · Quetiapine increased to 100 mg daily  · Patient has outpatient follow up with psychiatry on 7/19/2021    Esophageal reflux  Assessment & Plan  · Currently stable  Continue Protonix 20 mg q d  While inpatient      [address] COURSE     Patient is a 56yo female with a past history of bipolar disorder, PTSD, panic attacks, fibromyalgia, chronic back pain, hypertension, urinary incontinence/overactive bladder, previous 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, migraines and polypharmacy who presented to the ED on 6/27 with altered mental status  Her son reported she was in at her baseline mental status the night before then became incoherent and repetitive  In the ED she was hypertensive, tachycardic, and tachypneic  Stroke alert was called, but CTH and CTA were unremarkable  Neurology and toxicology were consulted   There was concern for anticholinergic toxicity and she received 1 dose of physostigmine in the ED wihich did not resolve her symptoms  Her encephalopathy was attributed to polypharmacy and all home psychoactive and pain medications were held including: including: hydroxyzine 50 mg QHS, nortriptyline 10 mg QHS, trazodone 200 mg QHS, oxybutynin 5 mg BID, pregabalin 225 mg QD, duloxetine 60 mg QD, morphine 15 mg BID, oxycodone 5 QD, Ingrezza 80 mg daily, lorazepam 0 5 mg Q6H  Hourly Lorazepam 1mg  was initiated for agitation  Ms Juan Gordon continued to be encephalopathic for several days after admission  Stool studies including ova and parasite, fecal leukocyte, stool enteric panel, and C diff were all negative  Additional testing including HIV, lyme, B1 levels, B12 levels and RPR were all unremarkable  Spot EEG were consistent with moderate generalized non-specific encephalopathy without electrographic seizures  A brain MRI was not done due to her spinal stimulator being incomparable and repeat head CT on 6/29 was normal  She was started on a pain control regiment of oxycodone 5 mg Q6H  Her home cymbalta, lyrica and valbenazine were slowly added back to help control her pain and anxiety  On 7/2 her mental status showed some improvement  She was AOx2, able to say her name and that she was in the hospital  She continued improving on 7/3 and was AOx3  We continued to add back some of her home meds slowly while monitoring her mental status to control her pain and anxiety  On 7/7 she was medically cleared to be discharged  Patient refused rehab, but was amendable to home PT  She has a follow up appointment with her [facility] scheduled for 7/19  She has a scheduled appointment with her primary care on 7/15   Her medications at discharge include: Morphine 15 mg Q12H, Oxycodone 5 mg daily PRN, amlodipine 5 mg daily, atrovastatin 40 mg daily, prazosin 2 mg daily, buspirone 15 mg TID, Duloxetine 60 mg dialy, lorazepam 0 5 mg Q8H, quetiapine 100 mg dily, Valbenazine 80 mg daily, omeprazole 20 mg daily and pregabalin 100 mg BID  The importance of her medication compliance and follow up with psychiatry were stressed to avoid another similar episode of altered mental status  Of note, during her hospital stay she had multiple episodes of hypokalemia with the lowest reading of 2 8  She had poor PO intake during most of her hospital stay and episodes of diarrhea and vomiting that were resolved on discharge  Her K was repleated throughout her hospital stay and  she was discharged on 20 mEq PO K  Please follow up outpatient [facility]  Subjective:   Patient seen and examined today  No events overnight  She was resting in bed during the interview  She endorsed some nausea and her baseline back pain  She was denying any chest pain or shortness of breath  She said that she was nervous about going home  She is refusing inpatient rehab at this time and would like home PT  Vitals:    07/06/21 1454 07/06/21 2210 07/07/21 0600 07/07/21 0754   BP: 140/75 134/82  142/97   Pulse:       Resp: 20 18  18   Temp: 98 8 °F (37 1 °C) 98 7 °F (37 1 °C)  97 8 °F (36 6 °C)   TempSrc:       SpO2:       Weight:   97 8 kg (215 lb 9 8 oz)    Height:           Physical Exam  Constitutional:       General: She is not in acute distress  Appearance: Normal appearance  She is obese  She is not ill-appearing, toxic-appearing or diaphoretic  HENT:      Head: Normocephalic and atraumatic  Mouth/Throat:      Mouth: Mucous membranes are moist       Pharynx: Oropharynx is clear  Eyes:      General:         Right eye: No discharge  Left eye: No discharge  Cardiovascular:      Rate and Rhythm: Normal rate and regular rhythm  Pulses: Normal pulses  Heart sounds: Normal heart sounds  Pulmonary:      Effort: Pulmonary effort is normal  No respiratory distress  Breath sounds: Normal breath sounds  No stridor  No wheezing, rhonchi or rales  Chest:      Chest wall: No tenderness     Abdominal:      General: Bowel sounds are normal  There is no distension  Palpations: Abdomen is soft  There is no mass  Tenderness: There is no abdominal tenderness  There is no right CVA tenderness, left CVA tenderness, guarding or rebound  Hernia: No hernia is present  Musculoskeletal:         General: No swelling, tenderness, deformity or signs of injury  Normal range of motion  Cervical back: Normal range of motion and neck supple  Right lower leg: No edema  Left lower leg: No edema  Skin:     General: Skin is warm and dry  Coloration: Skin is not jaundiced or pale  Findings: No bruising, erythema, lesion or rash  Neurological:      General: No focal deficit present  Mental Status: She is alert and oriented to person, place, and time  Psychiatric:         Mood and Affect: Mood normal          Behavior: Behavior normal          Thought Content: Thought content normal          Judgment: Judgment normal       Comments: Patient appeared anxious          DISCHARGE INFORMATION     PCP at Discharge: Giancarlo Reaves MD    Admitting Provider: Jacy Fried MD  Admission Date: 6/27/2021    Discharge Provider: Bonita Fraser MD  Discharge Date: 7/7/2021    Discharge Disposition: Discharge transferred to a designated disaster alternate care  Discharge Condition: fair  Discharge with Lines: no    Discharge Diet: regular diet  Activity Restrictions: none  Test Results Pending at Discharge: none    Discharge Diagnoses:  Principal Problem:    Encephalopathy  Active Problems:    Chronic pain syndrome    Anxiety    Bipolar II disorder (HCC)    Esophageal reflux    Hypertension    Hypokalemia    Opioid dependence ([address] )    Tardive dyskinesia  Resolved Problems:    Altered mental status      Consulting Providers:  Psychiatry    Diagnostic & Therapeutic Procedures Performed:  XR chest portable    Result Date: 6/30/2021  Impression: No acute cardiopulmonary disease   Workstation performed: [id number]     CT head wo contrast    Result Date: 6/29/2021  Impression: Grossly stable exam   No acute CT abnormality  Workstation performed: [id number]     CT stroke alert brain    Result Date: 6/27/2021  Impression: No acute intracranial abnormality  Findings were directly discussed with Dr Maciel Sampson on 6/27/2021 11:27 AM  Workstation performed: [id number]     US right upper quadrant    Result Date: 6/30/2021  Impression: Pancreas obscured by bowel gas  Cholelithiasis without ultrasonographic evidence for cholecystitis  6 mm echogenic nodule left hepatic lobe, possibly hemangioma although not clearly seen previously  Follow-up ultrasound in 6 months recommended to assess size stability  If there is higher level of clinical concern based on patient's history, shorter interval nonemergent outpatient MRI abdomen with IV contrast (Gadavist) could be obtained for further characterization  The study was marked in [facility] for immediate notification and follow-up  Workstation performed: [id number]     CTA stroke alert (head/neck)    Result Date: 6/27/2021  Impression: No significant carotid or vertebral artery stenosis  No focal intracranial stenosis or aneurysm   Findings were directly discussed with Dr Maciel Sampson on 6/27/2021 11:27 AM  Workstation performed: [id number]       Code Status: Level 1 - Full Code  Advance Directive & Living Will: <no information>  Power of :    POLST:      Medications:  Current Discharge Medication List        Current Discharge Medication List      START taking these medications    Details   Diclofenac Sodium (VOLTAREN) 1 % Apply 2 g topically 4 (four) times a day  Qty: 150 g, Refills: 0    Associated Diagnoses: Bilateral chronic knee pain           Current Discharge Medication List      CONTINUE these medications which have NOT CHANGED    Details   acetaminophen (TYLENOL) 325 mg tablet Take 2 tablets (650 mg total) by mouth every 8 (eight) hours as needed for mild pain  Qty: 60 tablet, Refills: 2    Associated Diagnoses: Ambulatory dysfunction; Lumbar radiculopathy      amLODIPine (NORVASC) 5 mg tablet Take 1 tablet (5 mg total) by mouth daily  Qty: 90 tablet, Refills: 3    Associated Diagnoses: Hypertension, unspecified type      [employer] Vitamin C 500 MG tablet TAKE 1 TABLET BY MOUTH TWICE A DAY  Qty: 60 tablet, Refills: 0    Associated Diagnoses: Primary osteoarthritis of right knee; Preop testing      DULoxetine (CYMBALTA) 60 mg delayed release capsule TAKE 1 CAPSULE BY MOUTH EVERY DAY  Qty: 30 capsule, Refills: 0    Associated Diagnoses: Generalized anxiety disorder      ferrous sulfate 324 (65 Fe) mg TAKE 1 TABLET (324 MG TOTAL) BY MOUTH 2 (TWO) TIMES A DAY BEFORE MEALS  Qty: 60 tablet, Refills: 1    Associated Diagnoses: Primary osteoarthritis of right knee; Preop testing      folic acid (FOLVITE) 1 mg tablet TAKE 1 TABLET BY MOUTH EVERY DAY  Qty: 30 tablet, Refills: 1    Associated Diagnoses: Primary osteoarthritis of right knee; Preop testing      hydrOXYzine HCL (ATARAX) 50 mg tablet TAKE 1 TABLET (50 MG TOTAL) BY MOUTH DAILY AT BEDTIME AS NEEDED FOR ANXIETY (INSOMNIA)  Qty: 90 tablet, Refills: 0    Associated Diagnoses: Anxiety      lidocaine (LMX) 4 % cream Apply topically as needed for mild pain  Qty: 30 g, Refills: 0    Associated Diagnoses: Lumbar radiculopathy      LORazepam (ATIVAN) 0 5 mg tablet Take by mouth every 6 (six) hours      morphine (MS CONTIN) 15 mg 12 hr tablet Take 1 tablet (15 mg total) by mouth 2 (two) times a day Hold if sedatedMax Daily Amount: 30 mg  Qty: 30 tablet, Refills: 0    Comments: PDMP checked and verified    Associated Diagnoses: Lumbar radiculopathy; Chronic pain disorder      Mouthwashes (Biotene Dry Mouth) LIQD Apply 5 mL to the mouth or throat daily      nortriptyline (PAMELOR) 10 mg capsule Take 1 capsule (10 mg total) by mouth daily at bedtime  Qty: 7 capsule, Refills: 0    Associated Diagnoses: Insomnia      orlistat (AYDEN) 60 MG capsule Take 60 mg by mouth 3 (three) times a day with meals      oxyCODONE (ROXICODONE) 5 mg immediate release tablet Take 1 tablet (5 mg total) by mouth dailyMax Daily Amount: 5 mg  Qty: 15 tablet, Refills: 0    Associated Diagnoses: Lumbar radiculopathy; Chronic pain disorder      Polyethylene Glycol 3350 (MIRALAX PO) Take by mouth      prazosin (MINIPRESS) 2 mg capsule TAKE 1 CAPSULE BY MOUTH EVERY DAY  Qty: 30 capsule, Refills: 1    Associated Diagnoses: Hypertension, unspecified type      pregabalin (LYRICA) 225 MG capsule TAKE 1 CAPSULE (225 MG TOTAL) BY MOUTH EVERY 12 (TWELVE) HOURS  Qty: 60 capsule, Refills: 0    Comments: Not to exceed 5 additional fills before 09/05/2021 DX Code Needed  PT REQUEST REFILL    Associated Diagnoses: Lumbar radiculopathy; Chronic pain disorder      traZODone (DESYREL) 100 mg tablet Take 100 mg by mouth daily at bedtime 2 tablets daily at bedtime      Valbenazine Tosylate (Ingrezza) 80 MG CAPS Take 80 mg by mouth daily  Qty: 30 capsule, Refills: 1    Associated Diagnoses: Tardive dyskinesia      bisacodyl (DULCOLAX) 5 mg EC tablet Take 5 mg by mouth daily as needed for constipation      busPIRone (BUSPAR) 15 mg tablet Take 1 tablet (15 mg total) by mouth 3 (three) times a day  Qty: 90 tablet, Refills: 0    Associated Diagnoses: Generalized anxiety disorder      cetirizine (ZyrTEC) 10 mg tablet Take 10 mg by mouth as needed       cholecalciferol (VITAMIN D3) 1,000 units tablet Take 1 tablet (1,000 Units total) by mouth daily  Qty: 90 tablet, Refills: 5    Associated Diagnoses: Vitamin D deficiency      Diapers & Supplies (Huggies Pull-Ups) MISC Use 3 (three) times a day  Qty: 100 each, Refills: 3    Associated Diagnoses: Urge incontinence of urine      ipratropium-albuterol (DUO-NEB) 0 5-2 5 mg/3 mL nebulizer solution Take 1 vial (3 mL total) by nebulization every 6 (six) hours as needed for wheezing or shortness of breath  Qty: 3 mL, Refills: 2    Associated Diagnoses: Dyspnea      Multiple Vitamins-Minerals (multivitamin with minerals) tablet Take 1 tablet by mouth daily  Qty: 30 tablet, Refills: 1    Associated Diagnoses: Primary osteoarthritis of right knee; Preop testing      naloxone (NARCAN) 4 mg/0 1 mL nasal spray Administer 1 spray into a nostril  If no response after 2-3 minutes, give another dose in the other nostril using a new spray    Qty: 1 each, Refills: 1    Associated Diagnoses: Lumbar radiculopathy; Chronic pain disorder      omeprazole (PriLOSEC) 20 mg delayed release capsule Take 1 capsule (20 mg total) by mouth daily  Qty: 90 capsule, Refills: 3    Associated Diagnoses: Gastroesophageal reflux disease without esophagitis      ondansetron (ZOFRAN-ODT) 4 mg disintegrating tablet Take 1 tablet (4 mg total) by mouth every 6 (six) hours as needed for nausea or vomiting  Qty: 20 tablet, Refills: 0    Associated Diagnoses: Vomiting      oxybutynin (DITROPAN) 5 mg tablet Take 1 tablet (5 mg total) by mouth 2 (two) times a day  Qty: 180 tablet, Refills: 3    Associated Diagnoses: Urge incontinence of urine      potassium chloride (MICRO-K) 10 MEQ CR capsule Take 10 mEq by mouth 2 (two) times a day Two tabs, two times daily      QUEtiapine (SEROquel) 300 mg tablet Take 300 mg by mouth daily at bedtime      Respiratory Therapy Supplies (Nebulizer) YUDY Use as needed (Shortness of breath)  Qty: 1 each, Refills: 0    Associated Diagnoses: Dyspnea      Sennosides (SENEXON PO) Take by mouth      SIMETHICONE PO Take by mouth as needed       sodium chloride (OCEAN) 0 65 % nasal spray 2 sprays into each nostril as needed      triamcinolone (KENALOG) 0 025 % cream Apply topically 2 (two) times a day  Qty: 30 g, Refills: 0    Associated Diagnoses: Rash             Allergies:  No Known Allergies    FOLLOW-UP     PCP Outpatient Follow-up:  yes      Date and time:  7/15/2021, at 10:30 am  Location: [address] [place] [id number]  Follow up within next: Appointment scheduled for 7/15/2021    Consulting Providers Follow-up:  yes      Physician name: [facility] psychiatry  Specialty: psychiatry  Office phone number: [phone]  Follow up within next: patient has appointment scheduled for 7/19/2021     Active Issues Requiring Follow-up:   yes     Issue: Polypharmacy, psychiatric disorder  Responsible Individual: psychiatry  What is Needed: close outpatient follow up  Follow-up Appointments Arranged: Yes       Discharge Statement:   I spent 1 hour minutes discharging the patient  This time was spent on the day of discharge  I had direct contact with the patient on the day of discharge  Additional documentation is required if more than 30 minutes were spent on discharge  Portions of the record may have been created with voice recognition software  Occasional wrong word or "sound a like" substitutions may have occurred due to the inherent limitations of voice recognition software    Read the chart carefully and recognize, using context, where substitutions have occurred     ==  [address], [place]

## 2021-07-07 NOTE — INCIDENTAL FINDINGS
The following findings require follow up:  Radiographic finding   Findin mm echogenic nodule left hepatic lobe, possibly hemangioma although not clearly seen previously  Follow-up ultrasound in 6 months recommended to assess size stability      Follow up required: yes    Follow up should be done within 6  month(s)    Please notify the following clinician to assist with the follow up: PCP      Erik Esqueda, DO  Internal Medicine-[id number]

## 2021-07-08 ENCOUNTER — TRANSITIONAL CARE MANAGEMENT (OUTPATIENT)
Dept: INTERNAL MEDICINE CLINIC | Facility: CLINIC | Age: 56
End: 2021-07-08

## 2021-07-08 ENCOUNTER — PATIENT OUTREACH (OUTPATIENT)
Dept: INTERNAL MEDICINE CLINIC | Facility: CLINIC | Age: 56
End: 2021-07-08

## 2021-07-08 NOTE — PROGRESS NOTES
Outpatient Care Management Note:    Patient was inpatient at [facility] from 6/27-7/7/21 for encephalopathy  Patient declined short term rehab and was discharged home with [address] for nursing & PT/OT  Patient scheduled to follow up with [facility] health on 7/19/21  Patient scheduled for hospital follow up with PCP office on 7/12 @ 9 am with Dr Akil Duran  Patient was ordered BMP to check potassium and discharged home on potassium supplement  I called patient on mobile number [phone] and no answer and requested a call back to follow up with her to see how she is feeling, review medication, and review follow up appointments  I left my contact number [phone]  I called home number listed [phone] and her son Berna Hanley picked up and reports this number is for his cell phone  He is currently not with patient and is at [facility] pharmacy picking up medication  I explained reason for call and he reports they will call me back later today

## 2021-07-08 NOTE — PROGRESS NOTES
Received return call from patient's son Gail Velez along with patient  They are aware patient is scheduled for Monday 7/12 @ 9 am for hospital follow up appointment  Did review appointment for 7/15 with PCP office that was previously scheduled will be canceled as it is not needed  Son will be with patient at appointment and will be taking Amilcar Deems ride  They are aware patient has transportation through her insurance and has phone number to call  Patient is registered with [employer] but owes them money and will not be able to schedule trips until she pays bill  Son made aware and they will follow up on this  Patient usually uses wheelchair to go out for appointments and so reports recently go a new wheelchair but exchanging it for a smaller one with [employer] [employer] so it is easier to maneuver in the apartment  Patient has walker, shower chair, and crutches in the home  Patient lives with son Gail Velez in a 2 bedroom apartment on the first floor  He reports there are 2 steps to get into the building  I did review with son upcoming mental health appointment on 7/19 and importance of following up routinely  I did review with son all of patient's medications on discharge med list and how they are to be taken  She has all medications on discharge med list except Multivitamin and Potassium supplement  Clarification was needed on instructions for Potassium  I tigertext Dr Zeke Rutledge who sent script at discharge to get clarification and should take 20 mEq daily  [facility] [address] [facility] and spoke with pharmacist and made them aware  They do have 20 mEq tablets in stock and will give them to patient to take 1 pill daily and will give quantity of 30 tablets  I did call son back and made him aware of this information  He reports understanding and reviewed need for BMP ([place]) on 7/14 and that [address] nurse could get bloodwork  Son made aware they are to be out Friday 7/9   Son provided with the phone number if needed  Son confirms has Morphine 15 mg every 12 hours and Oxycodone 5 mg daily  I did review patient to take has prescribed and must bring these bottles with her to appointment so PCP office can continue to prescribe  Son confirms has Narcan in the home and he is aware of the location of it in the home  Patient has Voltaren gel and Lidocaine cream in the home  Patient has Lyrica 100 mg twice a day to take for pain  Son confirms patient has buspar 15 mg and is aware to be taken 3 times a day  Has Duloxetine 60 mg daily, Lorazepam 0 5 mg and per patient only taking 3 times a day instead of 4 (every 6 hours)  I did encourage her to about this further at upcoming mental health appointment  Patient has seroquel 100 mg at bedtime in the home  Son reports patient also has 300 mg and 400 mg tablets  I made him aware they should not be taken and should be discarded safely  Patient reports she does not have a nebulizer machine in the home  They are requesting an inhaler to use as needed instead of nebulizer treatment  Can be discussed further at hospital follow up appointment  I did review the need to take all medication as prescribed  Son reports he will be helping to manage patient's medication and will be getting a pill box to set up for the week  He reports too overwhelming to go through the medication on a daily basis  I did suggest bubble packing  Patient reports she had it before and reports difficulty opening the packaging  Patient also does not like the idea of [facility] a [facility] for the bubble packing and not wanting to change pharmacies at this time  Son will try pill box and if not going well will further consider bubble packing  Patient is in the process of applying for PA waiver services in the home for additional help  Son reports he is currently on FMLA until 7/13 but trying to get in extended to 7/26 and will then be going back to work       Patient and son do  not have any further questions, concerns, or other needs at this time  They have my contact # and PCP office # if needed  Pt and son Marielle Ashvin are  agreeable for further outreach

## 2021-07-08 NOTE — UTILIZATION REVIEW
Notification of Discharge   This is a Notification of Discharge from our facility [address]  Please be advised that this patient has been discharge from our facility  Below you will find the admission and discharge date and time including the patients disposition  UTILIZATION REVIEW CONTACT:  Lucas Baron  Utilization   Network Utilization Review Department  Phone: [phone] x carefully listen to the prompts  All voicemails are confidential   Email: [email or web address]     PHYSICIAN ADVISORY SERVICES:  FOR [id number] REVIEW - MEDICAL NECESSITY DENIAL  Phone: [phone]  Fax: [phone]  Email: [email or web address]     PRESENTATION DATE: 6/27/2021 10:56 AM  OBERVATION ADMISSION DATE:   INPATIENT ADMISSION DATE: 6/27/21  2:14 PM   DISCHARGE DATE: 7/7/2021  6:09 PM  DISPOSITION: Home with [place] with [address] [place] [address] Road INFORMATION:  Send all requests for admission clinical reviews, approved or denied determinations and any other requests to dedicated fax number below belonging to the campus where the patient is receiving treatment   List of dedicated fax numbers:  [address] [place] [address] DENIALS (Administrative/Medical Necessity) [phone]   [address] (Maternity/NICU/Pediatrics) [phone]   Devendra Cota [phone]   Cristian More [phone]   Radha Mercedes [phone]   Paige Saucedo [facility] [address] [facility] [phone]   [facility]  [phone]   [address] [place] [address], [address]  [address] [facility] [address] [facility] [phone]

## 2021-07-11 ENCOUNTER — HOSPITAL ENCOUNTER (EMERGENCY)
Facility: HOSPITAL | Age: 56
Discharge: HOME | End: 2021-07-11
Attending: EMERGENCY MEDICINE | Admitting: EMERGENCY MEDICINE
Payer: COMMERCIAL

## 2021-07-11 ENCOUNTER — APPOINTMENT (EMERGENCY)
Dept: RADIOLOGY | Facility: HOSPITAL | Age: 56
End: 2021-07-11
Payer: COMMERCIAL

## 2021-07-11 VITALS
RESPIRATION RATE: 20 BRPM | TEMPERATURE: 98.8 F | BODY MASS INDEX: 40.59 KG/M2 | SYSTOLIC BLOOD PRESSURE: 138 MMHG | WEIGHT: 215 LBS | HEIGHT: 61 IN | HEART RATE: 110 BPM | DIASTOLIC BLOOD PRESSURE: 89 MMHG | OXYGEN SATURATION: 99 %

## 2021-07-11 DIAGNOSIS — F41.0 PANIC ATTACK: ICD-10-CM

## 2021-07-11 DIAGNOSIS — F41.9 ANXIETY: Primary | ICD-10-CM

## 2021-07-11 LAB
ALBUMIN SERPL BCP-MCNC: 3.7 G/DL (ref 3.5–5)
ALP SERPL-CCNC: 104 U/L (ref 46–116)
ALT SERPL W P-5'-P-CCNC: 18 U/L (ref 12–78)
ANION GAP SERPL CALCULATED.3IONS-SCNC: 12 MMOL/L (ref 4–13)
AST SERPL W P-5'-P-CCNC: 12 U/L (ref 5–45)
BASOPHILS # BLD AUTO: 0.03 THOUSANDS/ΜL (ref 0–0.1)
BASOPHILS NFR BLD AUTO: 1 % (ref 0–1)
BILIRUB SERPL-MCNC: 0.67 MG/DL (ref 0.2–1)
BUN SERPL-MCNC: 7 MG/DL (ref 5–25)
CALCIUM SERPL-MCNC: 9.1 MG/DL (ref 8.3–10.1)
CHLORIDE SERPL-SCNC: 110 MMOL/L (ref 100–108)
CO2 SERPL-SCNC: 18 MMOL/L (ref 21–32)
CREAT SERPL-MCNC: 0.85 MG/DL (ref 0.6–1.3)
EOSINOPHIL # BLD AUTO: 0.05 THOUSAND/ΜL (ref 0–0.61)
EOSINOPHIL NFR BLD AUTO: 1 % (ref 0–6)
ERYTHROCYTE [DISTWIDTH] IN BLOOD BY AUTOMATED COUNT: 13.3 % (ref 11.6–15.1)
GFR SERPL CREATININE-BSD FRML MDRD: 88 ML/MIN/1.73SQ M
GLUCOSE SERPL-MCNC: 146 MG/DL (ref 65–140)
HCT VFR BLD AUTO: 41.4 % (ref 34.8–46.1)
HGB BLD-MCNC: 14 G/DL (ref 11.5–15.4)
IMM GRANULOCYTES # BLD AUTO: 0.06 THOUSAND/UL (ref 0–0.2)
IMM GRANULOCYTES NFR BLD AUTO: 1 % (ref 0–2)
LIPASE SERPL-CCNC: 80 U/L (ref 73–393)
LYMPHOCYTES # BLD AUTO: 0.83 THOUSANDS/ΜL (ref 0.6–4.47)
LYMPHOCYTES NFR BLD AUTO: 13 % (ref 14–44)
MCH RBC QN AUTO: 29.3 PG (ref 26.8–34.3)
MCHC RBC AUTO-ENTMCNC: 33.8 G/DL (ref 31.4–37.4)
MCV RBC AUTO: 87 FL (ref 82–98)
MONOCYTES # BLD AUTO: 0.33 THOUSAND/ΜL (ref 0.17–1.22)
MONOCYTES NFR BLD AUTO: 5 % (ref 4–12)
NEUTROPHILS # BLD AUTO: 5.24 THOUSANDS/ΜL (ref 1.85–7.62)
NEUTS SEG NFR BLD AUTO: 79 % (ref 43–75)
NRBC BLD AUTO-RTO: 0 /100 WBCS
PLATELET # BLD AUTO: 402 THOUSANDS/UL (ref 149–390)
PMV BLD AUTO: 9.4 FL (ref 8.9–12.7)
POTASSIUM SERPL-SCNC: 3.2 MMOL/L (ref 3.5–5.3)
PROT SERPL-MCNC: 7.8 G/DL (ref 6.4–8.2)
RBC # BLD AUTO: 4.78 MILLION/UL (ref 3.81–5.12)
SODIUM SERPL-SCNC: 140 MMOL/L (ref 136–145)
WBC # BLD AUTO: 6.54 THOUSAND/UL (ref 4.31–10.16)

## 2021-07-11 PROCEDURE — 36415 COLL VENOUS BLD VENIPUNCTURE: CPT

## 2021-07-11 PROCEDURE — 85025 COMPLETE CBC W/AUTO DIFF WBC: CPT

## 2021-07-11 PROCEDURE — 96376 TX/PRO/DX INJ SAME DRUG ADON: CPT

## 2021-07-11 PROCEDURE — 80053 COMPREHEN METABOLIC PANEL: CPT

## 2021-07-11 PROCEDURE — 83690 ASSAY OF LIPASE: CPT

## 2021-07-11 PROCEDURE — 99284 EMERGENCY DEPT VISIT MOD MDM: CPT

## 2021-07-11 PROCEDURE — 96374 THER/PROPH/DIAG INJ IV PUSH: CPT

## 2021-07-11 PROCEDURE — 99285 EMERGENCY DEPT VISIT HI MDM: CPT | Performed by: EMERGENCY MEDICINE

## 2021-07-11 PROCEDURE — 71045 X-RAY EXAM CHEST 1 VIEW: CPT

## 2021-07-11 PROCEDURE — 93005 ELECTROCARDIOGRAM TRACING: CPT

## 2021-07-11 RX ORDER — LORAZEPAM 2 MG/ML
0.5 INJECTION INTRAMUSCULAR ONCE
Status: COMPLETED | OUTPATIENT
Start: 2021-07-11 | End: 2021-07-11

## 2021-07-11 RX ORDER — POTASSIUM CHLORIDE 20 MEQ/1
40 TABLET, EXTENDED RELEASE ORAL ONCE
Status: COMPLETED | OUTPATIENT
Start: 2021-07-11 | End: 2021-07-11

## 2021-07-11 RX ORDER — LORAZEPAM 2 MG/ML
1 INJECTION INTRAMUSCULAR ONCE
Status: COMPLETED | OUTPATIENT
Start: 2021-07-11 | End: 2021-07-11

## 2021-07-11 RX ADMIN — POTASSIUM CHLORIDE 40 MEQ: 1500 TABLET, EXTENDED RELEASE ORAL at 18:31

## 2021-07-11 RX ADMIN — LORAZEPAM 0.5 MG: 2 INJECTION INTRAMUSCULAR; INTRAVENOUS at 18:31

## 2021-07-11 RX ADMIN — LORAZEPAM 1 MG: 2 INJECTION INTRAMUSCULAR; INTRAVENOUS at 17:08

## 2021-07-11 NOTE — ED PROVIDER NOTES
History  Chief Complaint   Patient presents with    Panic Attack     Pt arrives via EMS, reports a panic attack for 3 hours - no relief from PO ativan at 1330  Samantha is a 49-year-old lady who presents with complaints of a panic attack and shortness of breath  Reports the she hurts all over and is scared  She describes that this started 3 hours ago and that it felt similar to prior panic attacks with feelings of impending doom, shortness of breath, and palpitation    She then called EMS who transferred here via ambulance  She tried to take 0 5mg of Ativan at earlier which did not help  Endorses associated symptoms of nausea vomiting, cough, shortness of breath, as well as numbness and tingling sensations  There is no inciting event or injury as a cause for symptoms  She states she feels really anxious about her recent hospital stay where she suffered from encephalopathy          Prior to Admission Medications   Prescriptions Last Dose Informant Patient Reported? Taking?    [facility] Vitamin C 500 MG tablet   No No   Sig: TAKE 1 TABLET BY MOUTH TWICE A DAY   DULoxetine (CYMBALTA) 60 mg delayed release capsule   No No   Sig: TAKE 1 CAPSULE BY MOUTH EVERY DAY   Diapers & Supplies (Huggies Pull-Ups) MISC   No No   Sig: Use 3 (three) times a day   Patient not taking: Reported on 6/27/2021   Diclofenac Sodium (VOLTAREN) 1 %   No No   Sig: Apply 2 g topically 4 (four) times a day   LORazepam (ATIVAN) 0 5 mg tablet  Self No No   Sig: Take 1 tablet (0 5 mg total) by mouth every 6 (six) hours   Patient taking differently: Take 0 5 mg by mouth 3 (three) times a day    Mouthwashes (Biotene Dry Mouth) LIQD   Yes No   Sig: Apply 5 mL to the mouth or throat daily   Patient not taking: Reported on 7/8/2021   Multiple Vitamins-Minerals (multivitamin with minerals) tablet  Outside Facility (Specify) No No   Sig: Take 1 tablet by mouth daily   Patient not taking: Reported on 6/27/2021   Polyethylene Glycol 3350 (MIRALAX PO)  Outside Facility (Specify) Yes No   Sig: Take by mouth   Patient not taking: Reported on 7/8/2021   QUEtiapine (SEROquel) 100 mg tablet   No No   Sig: Take 1 tablet (100 mg total) by mouth daily at bedtime   Respiratory Therapy Supplies (Nebulizer) YUDY   No No   Sig: Use as needed (Shortness of breath)   Patient not taking: Reported on 4/20/2021   Valbenazine Tosylate (Ingrezza) 80 MG CAPS   No No   Sig: Take 80 mg by mouth daily   acetaminophen (TYLENOL) 325 mg tablet   No No   Sig: Take 2 tablets (650 mg total) by mouth every 8 (eight) hours as needed for mild pain   amLODIPine (NORVASC) 5 mg tablet   No No   Sig: Take 1 tablet (5 mg total) by mouth daily   aspirin 81 mg chewable tablet   No No   Sig: Chew 1 tablet (81 mg total) daily   atorvastatin (LIPITOR) 40 mg tablet   No No   Sig: Take 1 tablet (40 mg total) by mouth daily with dinner   busPIRone (BUSPAR) 15 mg tablet   No No   Sig: Take 1 tablet (15 mg total) by mouth 3 (three) times a day   ferrous sulfate 324 (65 Fe) mg   No No   Sig: TAKE 1 TABLET (324 MG TOTAL) BY MOUTH 2 (TWO) TIMES A DAY BEFORE MEALS   folic acid (FOLVITE) 1 mg tablet   No No   Sig: TAKE 1 TABLET BY MOUTH EVERY DAY   glycerin-hypromellose- (ARTIFICIAL TEARS) 0 2-0 2-1 % SOLN   No No   Sig: Administer 1 drop to both eyes 2 (two) times a day   ipratropium-albuterol (DUO-NEB) 0 5-2 5 mg/3 mL nebulizer solution   No No   Sig: Take 1 vial (3 mL total) by nebulization every 6 (six) hours as needed for wheezing or shortness of breath   Patient not taking: Reported on 6/10/2021   lidocaine (LMX) 4 % cream  Outside Facility (Specify) No No   Sig: Apply topically as needed for mild pain   morphine (MS CONTIN) 15 mg 12 hr tablet   No No   Sig: Take 1 tablet (15 mg total) by mouth 2 (two) times a day Hold if sedatedMax Daily Amount: 30 mg   naloxone (NARCAN) 4 mg/0 1 mL nasal spray   No No   Sig: Administer 1 spray into a nostril   If no response after 2-3 minutes, give another dose in the other nostril using a new spray  ondansetron (ZOFRAN-ODT) 4 mg disintegrating tablet  Outside Facility (Specify) No No   Sig: Take 1 tablet (4 mg total) by mouth every 6 (six) hours as needed for nausea or vomiting   Patient not taking: Reported on 6/27/2021   oxyCODONE (ROXICODONE) 5 mg immediate release tablet   No No   Sig: Take 1 tablet (5 mg total) by mouth dailyMax Daily Amount: 5 mg   pantoprazole (PROTONIX) 20 mg tablet   No No   Sig: Take 1 tablet (20 mg total) by mouth daily in the early morning   potassium chloride (MICRO-K) 10 MEQ CR capsule   No No   Sig: Take 2 capsules (20 mEq total) by mouth daily Take 2 tablets (20 meq total) daily  prazosin (MINIPRESS) 2 mg capsule  Outside Facility (Specify) No No   Sig: TAKE 1 CAPSULE BY MOUTH EVERY DAY   pregabalin (LYRICA) 100 mg capsule   No No   Sig: Take 1 capsule (100 mg total) by mouth 2 (two) times a day   senna-docusate sodium (SENOKOT S) 8 6-50 mg per tablet   No No   Sig: Take 1 tablet by mouth daily at bedtime      Facility-Administered Medications: None       Past Medical History:   Diagnosis Date    Acid reflux     Anxiety     RESOLVED: [id number]    Arthritis     Bipolar 2 disorder (HCC)     FOLLOWS WITH PSYCHIATRIST  CONTINUE LAMOTRIGINE; RESOLVED: [id number]    Depression     Familial tremor     both hands    Fibromyalgia     LAST ASSESSED: [id number]    Hearing aid worn     left ear    [place] (hard of hearing)     left ear    Hypertension     Left-sided weakness     Lower back pain     Memory loss of unknown cause     long and short term    Migraine     Obesity     Obesity, Class II, BMI 35-39 9     Overactive bladder     Panic attack     Post traumatic stress disorder     Seasonal allergies     Stroke [facility])     questionable stroke 2009    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    CONT ASA 81 MG DAILY; RESOLVED: [id number]    Urinary incontinence     Wears dentures     partial lower / full upper    Wears glasses        Past Surgical History:   Procedure Laterality Date    BACK SURGERY       SECTION      COLONOSCOPY      RESOLVED: [id number]    EAR SURGERY      EGD      HYSTERECTOMY      MYRINGOTOMY W/ TUBES Left     NECK SURGERY  2019    MT CYSTOURETHROSCOPY N/A 2016    Procedure: CYSTOSCOPY, BOTOX INJECTION;  Surgeon: Gina Tanner MD;  Location: [facility] Main OR;  Service: Gynecology    MT IMPLANT SPINAL NEUROSTIM/ Right 2/10/2021    Procedure: REPLACEMENT IMPLANTABLE PULSE GENERATOR DORSAL SPINAL COLUMN STIMULATOR, RIGHT;  Surgeon: Arnaldo Angel MD;  Location: [facility];  Service: Neurosurgery    MT PERCUT IMPLNT Vasyl Hoist Right 2020    Procedure: INSERTION THORACIC DORSAL COLUMN SPINAL CORD STIMULATOR PERCUTANEOUS W IMPLANTABLE PULSE GENERATOR, RIGHT;  Surgeon: Arnaldo Angel MD;  Location:  MAIN OR;  Service: Neurosurgery    [address] [place] [address]    UPPER GASTROINTESTINAL ENDOSCOPY  2020       Family History   Problem Relation Age of Onset    Colon cancer Mother     Alzheimer's disease Father     Stroke Father     Colon cancer Brother     Bipolar disorder Brother     Breast cancer Maternal Aunt     Colon cancer Maternal Aunt     Heart disease Other     Diabetes Other     Hypertension Other     Seizures Son     Depression Paternal Grandfather     No Known Problems Sister     No Known Problems Brother     Thyroid disease Neg Hx      I have reviewed and agree with the history as documented      E-Cigarette/Vaping    E-Cigarette Use Never User      E-Cigarette/Vaping Substances    Nicotine No     THC No     CBD No     Flavoring No     Other No     Unknown No      Social History     Tobacco Use    Smoking status: Never Smoker    Smokeless tobacco: Never Used   Vaping Use    Vaping Use: Never used   Substance Use Topics    Alcohol use: Not Currently     Comment: 2 x year; being a social drinker as per all scripts     Drug use: No        Review of Systems   Constitutional: Negative for chills and fever  HENT: Negative for sore throat  Eyes: Negative for visual disturbance  Respiratory: Positive for cough and shortness of breath  Cardiovascular: Positive for palpitations  Negative for chest pain  Gastrointestinal: Positive for nausea and vomiting  Negative for abdominal pain  Genitourinary: Negative for dysuria  Musculoskeletal: Positive for back pain  Skin: Negative for color change  Neurological: Positive for headaches  Negative for syncope  Psychiatric/Behavioral: The patient is nervous/anxious  All other systems reviewed and are negative  Physical Exam  ED Triage Vitals [07/11/21 1642]   Temperature Pulse Respirations Blood Pressure SpO2   98 8 °F (37 1 °C) (!) 113 (!) 30 138/89 100 %      Temp Source Heart Rate Source Patient Position - Orthostatic VS BP Location FiO2 (%)   Oral Monitor Lying Right arm --      Pain Score       8             Orthostatic Vital Signs  Vitals:    07/11/21 1642 07/11/21 1926   BP: 138/89    Pulse: (!) 113 (!) 110   Patient Position - Orthostatic VS: Lying Sitting       Physical Exam  Vitals and nursing note reviewed  Constitutional:       General: She is in acute distress  Appearance: She is well-developed  HENT:      Head: Normocephalic and atraumatic  Eyes:      Extraocular Movements: Extraocular movements intact  Conjunctiva/sclera: Conjunctivae normal    Cardiovascular:      Rate and Rhythm: Regular rhythm  Tachycardia present  Heart sounds: No murmur heard  Pulmonary:      Effort: Pulmonary effort is normal  Tachypnea present  No respiratory distress  Breath sounds: Normal breath sounds  Abdominal:      Palpations: Abdomen is soft  Tenderness: There is no abdominal tenderness  Musculoskeletal:      Cervical back: Neck supple  Skin:     General: Skin is warm and dry     Neurological:      General: No focal deficit present  Mental Status: She is alert  Mental status is at baseline  Psychiatric:         Mood and Affect: Mood is anxious           ED Medications  Medications   LORazepam (ATIVAN) injection 1 mg (1 mg Intravenous Given 7/11/21 1708)   potassium chloride (K-DUR,KLOR-CON) CR tablet 40 mEq (40 mEq Oral Given 7/11/21 1831)   LORazepam (ATIVAN) injection 0 5 mg (0 5 mg Intravenous Given 7/11/21 1831)       Diagnostic Studies  Results Reviewed     Procedure Component Value Units Date/Time    Comprehensive metabolic panel [[id number]]  (Abnormal) Collected: 07/11/21 1710    Lab Status: Final result Specimen: Blood from Arm, Left Updated: 07/11/21 1737     Sodium 140 mmol/L      Potassium 3 2 mmol/L      Chloride 110 mmol/L      CO2 18 mmol/L      ANION GAP 12 mmol/L      BUN 7 mg/dL      Creatinine 0 85 mg/dL      Glucose 146 mg/dL      Calcium 9 1 mg/dL      AST 12 U/L      ALT 18 U/L      Alkaline Phosphatase 104 U/L      Total Protein 7 8 g/dL      Albumin 3 7 g/dL      Total Bilirubin 0 67 mg/dL      eGFR 88 ml/min/1 73sq m     Narrative:      [place] guidelines for Chronic Kidney Disease (CKD):     Stage 1 with normal or high GFR (GFR > 90 mL/min/1 73 square meters)    Stage 2 Mild CKD (GFR = 60-89 mL/min/1 73 square meters)    Stage 3A Moderate CKD (GFR = 45-59 mL/min/1 73 square meters)    Stage 3B Moderate CKD (GFR = 30-44 mL/min/1 73 square meters)    Stage 4 Severe CKD (GFR = 15-29 mL/min/1 73 square meters)    Stage 5 End Stage CKD (GFR <15 mL/min/1 73 square meters)  Note: GFR calculation is accurate only with a steady state creatinine    Lipase [[id number]]  (Normal) Collected: 07/11/21 1710    Lab Status: Final result Specimen: Blood from Arm, Left Updated: 07/11/21 1737     Lipase 80 u/L     CBC and differential [[id number]]  (Abnormal) Collected: 07/11/21 1710    Lab Status: Final result Specimen: Blood from Arm, Left Updated: 07/11/21 1724     WBC 6 54 Thousand/uL      RBC 4 78 Million/uL      Hemoglobin 14 0 g/dL      Hematocrit 41 4 %      MCV 87 fL      MCH 29 3 pg      MCHC 33 8 g/dL      RDW 13 3 %      MPV 9 4 fL      Platelets 385 Thousands/uL      nRBC 0 /100 WBCs      Neutrophils Relative 79 %      Immat GRANS % 1 %      Lymphocytes Relative 13 %      Monocytes Relative 5 %      Eosinophils Relative 1 %      Basophils Relative 1 %      Neutrophils Absolute 5 24 Thousands/µL      Immature Grans Absolute 0 06 Thousand/uL      Lymphocytes Absolute 0 83 Thousands/µL      Monocytes Absolute 0 33 Thousand/µL      Eosinophils Absolute 0 05 Thousand/µL      Basophils Absolute 0 03 Thousands/µL                  XR chest 1 view portable   ED Interpretation by Benny Ware MD (07/11 1747)   Reviewed  No consolidations or pneumo or effusions noted  No acute abnormalities to my read  Procedures  Procedures      ED Course                                       MDM  Number of Diagnoses or Management Options    Samantha is a 18-year-old who presents with complaints of shortness of breath and anxiety  Vital signs remarkable for tachycardia and tachypnea  Physical exam remarkable for acute distress  The differential is broad includes panic attack versus pulmonary embolism versus infectious etiology such as pneumonia (recent hospital stay, shortness of breath) verses abdominal pathology including pancreatitis versus other intra-abdominal or pulmonary pathology  Broad workup required  CBC, CMP, lipase, ordered     Ativan for anxiety control  EKG chest x-ray ordered to evaluate for cardiac and pulmonary pathology  Re-evaluated after administration of Ativan  Pts vitals have improved, is no longer short or breath but states she still feels anxious  CMP remarkable for hypokalemia as well as low CO2 (likely secondary to respiratory alkalosis)  CBC unremarkable  Lipase WNL  CXR unremarkable  K+ repleted with 40meq       Pt still complaining of significant anxiety  Administered 0 5mg more of Ativan  Re-evaluated patient again  Vital signs have improved and are normal   She states she is scared  Discussed extensively with patient her concerns, how she is concerned about her illness from her prior hospitalization, as well as that her anxiety is uncontrolled  Discussed with the patient that her workup was completely unremarkable, and how she should follow up with her psychiatrist and  to receive appropriate care  Patient expressed relief and that she would like to go home with appropriate follow-up  Discussed the care and treatment plan with the patient  Reviewed test results and/or imaging, as well as pertinent details of the treatment plan  The patient expressed understanding, was agreeable to the plan of care, and had no further questions or concerns  Discharge home  Disposition  Final diagnoses:   Anxiety   Panic attack     Time reflects when diagnosis was documented in both MDM as applicable and the Disposition within this note     Time User Action Codes Description Comment    7/11/2021  7:22 PM Kelvin Lerma [F41 9] Anxiety     7/11/2021  7:22 PM Travis Andersen [F41 0] Panic attack       ED Disposition     ED Disposition Condition Date/Time Comment    Discharge Stable Sun Jul 11, 2021  7:22 PM Samantha Andres discharge to home/self care  Follow-up Information     Follow up With Specialties Details Why Contact Info Additional [address] Emergency Department Emergency Medicine  If symptoms worsen [address]  [address] [place] [address] Emergency Department, [address], [facility], [address] [place] [address], [address] [phone]          Patient's Medications   Discharge Prescriptions    No medications on file     No discharge procedures on file      PDMP Review       Value Time User    PDMP Reviewed  Yes 6/10/2021 10:15 AM Griselda Mora DO           ED Provider  Attending physically available and evaluated Samantha CRISTIANO Junaid Mendes  I managed the patient along with the ED Attending      Electronically Signed by         Cici Doe MD  07/11/21 1932

## 2021-07-11 NOTE — DISCHARGE INSTRUCTIONS
Call and make an appointment with your psychiatrist and  in the morning  See if you can arrange to go to the nursing home like we talked about

## 2021-07-12 ENCOUNTER — PATIENT OUTREACH (OUTPATIENT)
Dept: INTERNAL MEDICINE CLINIC | Facility: CLINIC | Age: 56
End: 2021-07-12

## 2021-07-12 ENCOUNTER — OFFICE VISIT (OUTPATIENT)
Dept: INTERNAL MEDICINE CLINIC | Facility: CLINIC | Age: 56
End: 2021-07-12

## 2021-07-12 ENCOUNTER — TELEPHONE (OUTPATIENT)
Dept: INTERNAL MEDICINE CLINIC | Facility: CLINIC | Age: 56
End: 2021-07-12

## 2021-07-12 ENCOUNTER — APPOINTMENT (OUTPATIENT)
Dept: LAB | Facility: CLINIC | Age: 56
End: 2021-07-12
Payer: COMMERCIAL

## 2021-07-12 VITALS
OXYGEN SATURATION: 98 % | SYSTOLIC BLOOD PRESSURE: 110 MMHG | WEIGHT: 198 LBS | BODY MASS INDEX: 37.41 KG/M2 | DIASTOLIC BLOOD PRESSURE: 76 MMHG | TEMPERATURE: 97.5 F | HEART RATE: 106 BPM

## 2021-07-12 DIAGNOSIS — M17.10 OSTEOARTHRITIS OF KNEE, UNSPECIFIED LATERALITY, UNSPECIFIED OSTEOARTHRITIS TYPE: ICD-10-CM

## 2021-07-12 DIAGNOSIS — R11.0 NAUSEA: ICD-10-CM

## 2021-07-12 DIAGNOSIS — F41.9 ANXIETY: ICD-10-CM

## 2021-07-12 DIAGNOSIS — I10 ESSENTIAL HYPERTENSION: ICD-10-CM

## 2021-07-12 DIAGNOSIS — M25.561 ACUTE PAIN OF RIGHT KNEE: ICD-10-CM

## 2021-07-12 DIAGNOSIS — F31.81 BIPOLAR II DISORDER (HCC): ICD-10-CM

## 2021-07-12 DIAGNOSIS — G89.4 CHRONIC PAIN SYNDROME: ICD-10-CM

## 2021-07-12 DIAGNOSIS — Z79.891 CHRONIC PRESCRIPTION OPIATE USE: ICD-10-CM

## 2021-07-12 DIAGNOSIS — F09 COGNITIVE DISORDER: ICD-10-CM

## 2021-07-12 DIAGNOSIS — G93.40 ENCEPHALOPATHY: Primary | ICD-10-CM

## 2021-07-12 DIAGNOSIS — E87.6 HYPOKALEMIA: ICD-10-CM

## 2021-07-12 DIAGNOSIS — K21.9 GASTROESOPHAGEAL REFLUX DISEASE WITHOUT ESOPHAGITIS: ICD-10-CM

## 2021-07-12 DIAGNOSIS — M54.16 LUMBAR RADICULOPATHY: ICD-10-CM

## 2021-07-12 DIAGNOSIS — G24.01 TARDIVE DYSKINESIA: ICD-10-CM

## 2021-07-12 DIAGNOSIS — M16.0 PRIMARY OSTEOARTHRITIS OF BOTH HIPS: ICD-10-CM

## 2021-07-12 DIAGNOSIS — G89.4 CHRONIC PAIN DISORDER: ICD-10-CM

## 2021-07-12 LAB
ANION GAP SERPL CALCULATED.3IONS-SCNC: 9 MMOL/L (ref 4–13)
ATRIAL RATE: 85 BPM
BUN SERPL-MCNC: 5 MG/DL (ref 5–25)
CALCIUM SERPL-MCNC: 9 MG/DL (ref 8.3–10.1)
CHLORIDE SERPL-SCNC: 108 MMOL/L (ref 100–108)
CO2 SERPL-SCNC: 21 MMOL/L (ref 21–32)
CREAT SERPL-MCNC: 0.74 MG/DL (ref 0.6–1.3)
GFR SERPL CREATININE-BSD FRML MDRD: 104 ML/MIN/1.73SQ M
GLUCOSE P FAST SERPL-MCNC: 99 MG/DL (ref 65–99)
MAGNESIUM SERPL-MCNC: 1.9 MG/DL (ref 1.6–2.6)
P AXIS: 92 DEGREES
POTASSIUM SERPL-SCNC: 3.3 MMOL/L (ref 3.5–5.3)
PR INTERVAL: 152 MS
QRS AXIS: 84 DEGREES
QRSD INTERVAL: 88 MS
QT INTERVAL: 360 MS
QTC INTERVAL: 428 MS
SODIUM SERPL-SCNC: 138 MMOL/L (ref 136–145)
T WAVE AXIS: 36 DEGREES
VENTRICULAR RATE: 85 BPM

## 2021-07-12 PROCEDURE — 99496 TRANSJ CARE MGMT HIGH F2F 7D: CPT | Performed by: INTERNAL MEDICINE

## 2021-07-12 PROCEDURE — 83735 ASSAY OF MAGNESIUM: CPT

## 2021-07-12 PROCEDURE — 1111F DSCHRG MED/CURRENT MED MERGE: CPT | Performed by: INTERNAL MEDICINE

## 2021-07-12 PROCEDURE — 80307 DRUG TEST PRSMV CHEM ANLYZR: CPT | Performed by: STUDENT IN AN ORGANIZED HEALTH CARE EDUCATION/TRAINING PROGRAM

## 2021-07-12 PROCEDURE — 93010 ELECTROCARDIOGRAM REPORT: CPT | Performed by: INTERNAL MEDICINE

## 2021-07-12 PROCEDURE — 80365 DRUG SCREENING OXYCODONE: CPT | Performed by: STUDENT IN AN ORGANIZED HEALTH CARE EDUCATION/TRAINING PROGRAM

## 2021-07-12 PROCEDURE — 80048 BASIC METABOLIC PNL TOTAL CA: CPT

## 2021-07-12 PROCEDURE — 36415 COLL VENOUS BLD VENIPUNCTURE: CPT

## 2021-07-12 NOTE — PROGRESS NOTES
Outpatient Care Management Note:    Patient is PCP office today for transition of care appointment  It was noted patient was in the emergency room yesterday 7/11/21 for a panic attack  Patient is with her son Lexx Ford  Patient is using rollator today to ambulate  , Arielle Mccarthy and I met with patient and her son Lexx Angelesan is managing patient's medication at this time and will be setting up weekly pill box to make it easier to manage medication daily  Patient did bring pill bottles to appointment  Please see physician note and telephone encounter from 7/12/21 related to pain medication and that morphine and oxycodone will not be filled until 7/19/21  Patient will have bloodwork and urine tests completed today at lab at PCP office  Discussed with patient about PA waiver process and having assistance in the home  While meeting with patient she was becoming anxious and reports it felt like she was going to have another panic attack  Patient emotional and tearful at times during the visit  Patient was not always following topic of conversation  Patient reports today that she feels she may need to go to a nursing home for a short period of time to better manage  Patient was adamant she would not want to go to [facility]  Patient also shared that she is scared to be alone and not having help  Son reports patient is scheduled for a virtual visit with her mental health provider today with [address] for 12:30 pm  Patient agreeable to following up with mental health provider and letting them know about increased panic attacks and recent hospital stay and changes to medication  Also they will request for ICM services and will ask about recommendations they have regarding extra assistance she feels she needs  Encouraged close follow up with mental health       We did call [address] [facility] and Arielle Mccarthy had to leave message requesting they reach out to patient's son Lexx Ford at [phone] to schedule evaluation for PA waiver and possible nursing home  Son to keep us updated regarding how mental health appointment went and further resources available with mental health  Patient undecided if she wants to continue with PA waiver process or pursue possible nursing home placement and will discuss further with family and keep us updated  Patient and son do not have any further questions or concerns at this time  They have my contact #,  #, and PCP office # if needed  They are agreeable for further outreach  Son did report he was suppose to return to work tomorrow but is requesting for it to be extended until 7/26  He reports currently out on FMLA  Patient would consider PA waiver services if son is paid caregiver

## 2021-07-12 NOTE — PROGRESS NOTES
HIRAM has met with pt, son Leelee Navarro and Bishop Vasquez RN/CM this dates f/u to with pt re her [address] and PA Waiver application  Pt arrived with her son Leelee Navarro using a rollator walker  Pt became anxious several times during our meeting and stated she felt as if as if she would have a panic attack but her son was able to re-direct her   Her son has taken a FMLA leave to care for him Mother and indicates this needs  to be extended by tomorrow  Son relates that the pt  just prior to our meeting shared with him the she feels she may need to return to a nursing home  She does relate however she does NOT want to return to [facility]  Her son foreman point out that when pt was at the NH pt was very unhappy  SW/RN/CM asked if she would consider the alternative placement/ personal care placement she had been in at [facility] would be an option? Sw asked pt/son to inquire about same  In addition SW encouraged her to f/u with [place] [id number] [facility] re her request for an ICM  Son reports pt has an appointment with her psychiatrist today  HIRAM also suggested f/u with their  Mr Sameer Mario [phone] re both of these questions  With pt's permission HIARM also has called him to request help but had to leave a message  SW did review with pt the we are midway through the [facility] Application  She needs the evaluaton by AAA of her LOC as per the IEB  SW suggested reaching out to [facility] for the Waiver evaluation or the OPTION Evaluation if NH placement is again required as they would need to do a LOC assessment either way  Pt aggress to same  SW did call [facility] to ask for an appointment but had to leave a message  Prior calls made to the AAA as they felt pt was still in [facility]     Both pt and son relate pt is in [facility]  If pt would be approved for the PA Waiver son is considering becoming her paid care giver despite his health conditions   He would then have to give up his current job     SW requested they f/u closely with MH and to let SW know what they recommend and if they have any additionally suggestions for placement and ICM services  SW later received a message from Kash Weathers requesting help with his FMLA paperwork to get an EXTENSION to last until 7/26/21 to be faxed to [facility]  [facility] @ [address] [facility] # [phone]  SW spoke with clerical staff re same and NO Paperwork received  Carie/Clerical Staff spoke to son and suggested he e-mail it here as the FAX was not received  She will crate TASK for MD to complete  SW also received all from  [address] [place] [address] who relates PA Waiver is being done by [address] had called [facility] on 6/24/21 to request her assessment and [address] also called on 6/24/21  Julienne/CHW reports the they were insisting [facility] co was going to be doing the assessment  Sw again corrected her to tell her that the pt is living in [facility] as per pt and son  Mae July relates she  will ask the Assessment Supervisor Susan Mukherjee to return SW call  SW/RN/ CW to f/u with pt/son to assists as indicted

## 2021-07-12 NOTE — TELEPHONE ENCOUNTER
Patient came into the office on 7/12/21 for a TCM appt  She needs narcotics contract, UDS and pill count  I gave her the contract and asked her to read it then sign and I would give her a copy for her records  she refused to read it and was advised by her son that she needs to  She again said she didn't need to read it  I told her that he is saying that because if she doesn't read it and signs it then she cannot say after she leaves that she didn't understand something in the future  She continued to sign without reading  I then asked for her pill bottles for her Oxycodone and her Morphine  She said they are empty and she doesn't have any  Her son was made aware on 7/8/21 during a phone conversation that she needed to bring those with her to this appt and he or she did not say anything about not having any  I looked at the bottles and the Oxycodone bottle was dated 5/25/21 and the Morphine bottle was dated 6/7/21  Per the [address] patient had both of these filled on 6/25/21 for 15 day supply and then went into the hospital on 6/27/21 through 7/7/21 so she should have 8 day supply left  She said she doesn't know where they are and that she must have lost them  Patient does have all of her other pill bottles with her that are full  per her son he said there has been a lot going on with her and he is trying to help her with all of her meds  I made Dr Mindy Young and Dr Iris Emanuel aware that patient should still have an 8 day supply of both meds

## 2021-07-12 NOTE — PROGRESS NOTES
CHW left message for patient on this date  Pt had apt at the [facility] today and CHW unable to meet with patient  CHW will provide a list of Housing Applications completed and a copy of Housing Application names and telephone numbers to patient  A copy of the Social Security Card copy will be provided to [employer] at [employer]  Pt on Wait list for [address] on Wait list      [employer] on Wait list    [facility] Dorian on Wait list     At this time CHW will be closing the case as all the goals have been completed  CHW will communicate this information to team and pt

## 2021-07-12 NOTE — ED ATTENDING ATTESTATION
7/11/2021  Juani MARTINEZ DO, saw and evaluated the patient  I have discussed the patient with the resident/non-physician practitioner and agree with the resident's/non-physician practitioner's findings, Plan of Care, and MDM as documented in the resident's/non-physician practitioner's note, except where noted  All available labs and Radiology studies were reviewed  I was present for key portions of any procedure(s) performed by the resident/non-physician practitioner and I was immediately available to provide assistance  At this point I agree with the current assessment done in the Emergency Department  I have conducted an independent evaluation of this patient a history and physical is as follows:    43-year-old female with history of psychiatric disorders, complains of a panic attack  Onset was 3 hours ago  Patient reports that she is scared of having worsening anxiety  Denies SI or HI  Denies any specific complaints  She took Ativan without relief of her symptoms  Ambulance brought her in  No other modifying factors or associated symptoms  Denies chest pain fever chills  Plan is to administer IV Ativan, basic lab workup including metabolic derangement rule out  Patient is not psychotic and does not appear to be suffering from encephalopathy at this point  She has had recent admission for encephalopathy likely secondary to polypharmacy    Plan is to dispo patient feels better to follow-up with outpatient psychiatry and primary care doctor    ED Course         Critical Care Time  Procedures

## 2021-07-12 NOTE — PROGRESS NOTES
[facility]  INTERNAL MEDICINE TCM VISIT     PATIENT INFORMATION     Samantha Leonard Fess   60 y o  female   MRN: [id number]    ASSESSMENT/PLAN     Diagnoses and all orders for this visit:    Encephalopathy    Tardive dyskinesia    Anxiety    Bipolar II disorder (HCC)    Chronic pain syndrome    Acute pain of right knee    Osteoarthritis of knee, unspecified laterality, unspecified osteoarthritis type    Essential hypertension    Gastroesophageal reflux disease without esophagitis    Lumbar radiculopathy    Cognitive disorder    Primary osteoarthritis of both hips    Chronic prescription opiate use  -     Oxycodone/Oxymorphone urine  -     Toxicology screen, urine    Chronic pain disorder      Plan:  Refills for morphine and oxycodone will have to be placed on 07/19/2021 based on review of PDMP  Refills not provided at this time  Patient understands and agrees    I advised patient to follow-up with psychiatry and psychology  I also provided patient with coping mechanisms including praying, visiting a local Jewish, spending time with friends and family, diet lifestyle modifications, sleep hygiene, meditation and exercise  Follow-up for chronic problems in 4 months for chronic medical conditions    HISTORY OF PRESENT ILLNESS     Reason for recent hospitalization: encephalopathy     TCM Call (since 6/11/2021)     Date and time call was made  7/8/2021  2:33 PM    Hospital care reviewed  Records reviewed    Patient was hospitialized at  [facility]        Date of Admission  06/27/21    Date of discharge  07/07/21    Diagnosis  ENCEPHALOPATHY - G93 40    Disposition  Home (Comment)  LIVES WITH SON ANTHONY    Were the patients medications reviewed and updated  Yes      TCM Call (since 6/11/2021)     Post hospital issues  Reduced activity; Poor medication adherence; Poor ADL (Activities of Daily Living) performance (Comment)  PT  HAS A WHEELCHAIR, WALKER, SHOWER CHAIR AND CRUTCHES Should patient be enrolled in anticoag monitoring? No    Scheduled for follow up? Yes (Comment)  7/7/21 WITH DR WALKER    Did you obtain your prescribed medications  Yes (Comment)  USES [facility] [address]     Do you need help managing your prescriptions or medications  No (Comment)  SON WILL BE HELPING WITH MEDS MOVING FORWARD    Is transportation to your appointment needed  No (Comment)  HAS TRANSPORTATION THROUGH [employer], USES [employer] AND HAS [employer] BUT CURRENTLY OWES THEM MONEY    I have advised the patient to call PCP with any new or worsening symptoms  Nahomy Doll LPN    Living Arrangements  Children    Are you recieving home care services  Yes    Types of home care services  Nurse visit (Comment)  [address]    Have you fallen in the last 12 months  No    Interperter language line needed  No    Counseling  Family (Comment)  SPOKE TO SON ANTHONY    Counseling topics  instructions for management; Importance of RX compliance          Pt is a 56yo F w multiple medical problems including bipolar disorder, PTSD, panic attacks, fibromyalgia, chronic back pain, hypertension, urinary incontinence/overactive bladder, previous 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, migraines and polypharmacy who presents to the clinic for TCM visit after 10 day hospitalization 6/27 through 7/7 for encephalopathy, likely contributed to polypharmacy  Pt did have an ED visit yesterday for panic attack and also presenting to clinic today with panic attack  Pt would like to have oxycodone 5mg and morphine 15mg refilled however based on review of PDMP, patient should have enough supply to carry through 07/19/2021  Patient has empty bottles of oxycodone and morphine which she brought to clinic with the remainder of her medication  Ativan 0 5 mg tablets are provided by patient's psychiatrist which she is planning to see at 12:30 p m  today via telemedicine    Given her recent hospitalization for suspected polypharmacy causing encephalopathy and altered mental status, strict monitoring of opiate prescriptions are emphasized with patient and her son, Marcie Albright during encounter today  Medication reconciliation performed today as patient had her medications with her  Of note, patient was having a "panic attack" in clinic today, very emotional in tearing during encounter  HR of 106, /76  Patient Mentioned multiple traumatic type events in the past including the death of her , history of alcohol abuse which may be contributing to patient's panic disorder and anxiety  Patient did take Ativan this morning however she states is not helping  Patient did have an ED visit yesterday for a panic attack for which she took 3 tablets of 0 5 mg of Ativan without significant relief  I advised patient to follow-up with psychiatry and psychology  Denies SI/HI/hallucinations (auditory and visual)  Admits to racing thoughts  I also provided patient with coping mechanisms including praying, visiting a local Buddhism, spending time with friends and family, diet lifestyle modifications, sleep hygiene, meditation and exercise  Patient did feel much calmer by the end of the visit  REVIEW OF SYSTEMS     Review of Systems   Constitutional: Negative  HENT: Negative  Respiratory: Positive for shortness of breath  Cardiovascular: Positive for palpitations  Genitourinary: Negative  Musculoskeletal: Positive for arthralgias and back pain  Skin: Negative  Neurological: Negative  Psychiatric/Behavioral: Negative for hallucinations, self-injury and suicidal ideas  The patient is nervous/anxious and is hyperactive  OBJECTIVE     Vitals:    07/12/21 0909   BP: 110/76   BP Location: Left arm   Patient Position: Sitting   Cuff Size: Large   Pulse: (!) 106   Temp: 97 5 °F (36 4 °C)   TempSrc: Temporal   SpO2: 98%   Weight: 89 8 kg (198 lb)     Physical Exam  Vitals and nursing note reviewed     Constitutional: General: She is not in acute distress  Appearance: Normal appearance  She is obese  She is not ill-appearing or toxic-appearing  HENT:      Head: Normocephalic and atraumatic  Nose: Nose normal  No congestion  Mouth/Throat:      Mouth: Mucous membranes are moist       Pharynx: Oropharynx is clear  Eyes:      Extraocular Movements: Extraocular movements intact  Conjunctiva/sclera: Conjunctivae normal       Pupils: Pupils are equal, round, and reactive to light  Cardiovascular:      Rate and Rhythm: Regular rhythm  Tachycardia present  Heart sounds: Normal heart sounds  No murmur heard  Pulmonary:      Effort: Pulmonary effort is normal  No respiratory distress  Breath sounds: Normal breath sounds  No wheezing  Abdominal:      General: Abdomen is flat  Bowel sounds are normal  There is no distension  Palpations: Abdomen is soft  Tenderness: There is no abdominal tenderness  Musculoskeletal:         General: Normal range of motion  Cervical back: Normal range of motion and neck supple  Skin:     General: Skin is warm and dry  Capillary Refill: Capillary refill takes less than 2 seconds  Neurological:      Mental Status: She is alert and oriented to person, place, and time  Mental status is at baseline     Psychiatric:      Comments: Tearful and very anxious       CURRENT MEDICATIONS     Current Outpatient Medications:     acetaminophen (TYLENOL) 325 mg tablet, Take 2 tablets (650 mg total) by mouth every 8 (eight) hours as needed for mild pain, Disp: 60 tablet, Rfl: 2    amLODIPine (NORVASC) 5 mg tablet, Take 1 tablet (5 mg total) by mouth daily, Disp: 90 tablet, Rfl: 3    aspirin 81 mg chewable tablet, Chew 1 tablet (81 mg total) daily, Disp: 30 tablet, Rfl: 0    atorvastatin (LIPITOR) 40 mg tablet, Take 1 tablet (40 mg total) by mouth daily with dinner, Disp: 30 tablet, Rfl: 0    busPIRone (BUSPAR) 15 mg tablet, Take 1 tablet (15 mg total) by mouth 3 (three) times a day, Disp: 90 tablet, Rfl: 0    [facility] Vitamin C 500 MG tablet, TAKE 1 TABLET BY MOUTH TWICE A DAY, Disp: 60 tablet, Rfl: 0    Diapers & Supplies (Huggies Pull-Ups) MISC, Use 3 (three) times a day, Disp: 100 each, Rfl: 3    Diclofenac Sodium (VOLTAREN) 1 %, Apply 2 g topically 4 (four) times a day, Disp: 150 g, Rfl: 0    DULoxetine (CYMBALTA) 60 mg delayed release capsule, TAKE 1 CAPSULE BY MOUTH EVERY DAY, Disp: 30 capsule, Rfl: 0    ferrous sulfate 324 (65 Fe) mg, TAKE 1 TABLET (324 MG TOTAL) BY MOUTH 2 (TWO) TIMES A DAY BEFORE MEALS, Disp: 60 tablet, Rfl: 1    folic acid (FOLVITE) 1 mg tablet, TAKE 1 TABLET BY MOUTH EVERY DAY, Disp: 30 tablet, Rfl: 1    LORazepam (ATIVAN) 0 5 mg tablet, Take 1 tablet (0 5 mg total) by mouth every 6 (six) hours (Patient taking differently: Take 0 5 mg by mouth 3 (three) times a day ), Disp: , Rfl: 0    morphine (MS CONTIN) 15 mg 12 hr tablet, Take 1 tablet (15 mg total) by mouth 2 (two) times a day Hold if sedatedMax Daily Amount: 30 mg, Disp: 30 tablet, Rfl: 0    Mouthwashes (Biotene Dry Mouth) LIQD, Apply 5 mL to the mouth or throat daily , Disp: , Rfl:     naloxone (NARCAN) 4 mg/0 1 mL nasal spray, Administer 1 spray into a nostril  If no response after 2-3 minutes, give another dose in the other nostril using a new spray , Disp: 1 each, Rfl: 1    oxyCODONE (ROXICODONE) 5 mg immediate release tablet, Take 1 tablet (5 mg total) by mouth dailyMax Daily Amount: 5 mg, Disp: 15 tablet, Rfl: 0    pantoprazole (PROTONIX) 20 mg tablet, Take 1 tablet (20 mg total) by mouth daily in the early morning, Disp: 30 tablet, Rfl: 0    potassium chloride (MICRO-K) 10 MEQ CR capsule, Take 2 capsules (20 mEq total) by mouth daily Take 2 tablets (20 meq total) daily  , Disp: 60 capsule, Rfl: 0    prazosin (MINIPRESS) 2 mg capsule, TAKE 1 CAPSULE BY MOUTH EVERY DAY, Disp: 30 capsule, Rfl: 1    pregabalin (LYRICA) 100 mg capsule, Take 1 capsule (100 mg total) by mouth 2 (two) times a day, Disp: 60 capsule, Rfl: 1    QUEtiapine (SEROquel) 100 mg tablet, Take 1 tablet (100 mg total) by mouth daily at bedtime, Disp: 30 tablet, Rfl: 0    Valbenazine Tosylate (Ingrezza) 80 MG CAPS, Take 80 mg by mouth daily, Disp: 30 capsule, Rfl: 1    glycerin-hypromellose- (ARTIFICIAL TEARS) 0 2-0 2-1 % SOLN, Administer 1 drop to both eyes 2 (two) times a day (Patient not taking: Reported on 7/12/2021), Disp: 15 mL, Rfl: 0    ipratropium-albuterol (DUO-NEB) 0 5-2 5 mg/3 mL nebulizer solution, Take 1 vial (3 mL total) by nebulization every 6 (six) hours as needed for wheezing or shortness of breath (Patient not taking: Reported on 6/10/2021), Disp: 3 mL, Rfl: 2    lidocaine (LMX) 4 % cream, Apply topically as needed for mild pain (Patient not taking: Reported on 7/12/2021), Disp: 30 g, Rfl: 0    Multiple Vitamins-Minerals (multivitamin with minerals) tablet, Take 1 tablet by mouth daily (Patient not taking: Reported on 6/27/2021), Disp: 30 tablet, Rfl: 1    ondansetron (ZOFRAN-ODT) 4 mg disintegrating tablet, Take 1 tablet (4 mg total) by mouth every 6 (six) hours as needed for nausea or vomiting (Patient not taking: Reported on 6/27/2021), Disp: 20 tablet, Rfl: 0    Polyethylene Glycol 3350 (MIRALAX PO), Take by mouth (Patient not taking: Reported on 7/8/2021), Disp: , Rfl:     Respiratory Therapy Supplies (Nebulizer) YUDY, Use as needed (Shortness of breath) (Patient not taking: Reported on 4/20/2021), Disp: 1 each, Rfl: 0    senna-docusate sodium (SENOKOT S) 8 6-50 mg per tablet, Take 1 tablet by mouth daily at bedtime (Patient not taking: Reported on 7/12/2021), Disp: 30 tablet, Rfl: 0  No current facility-administered medications for this visit  HISTORICAL INFORMATION     Past Medical History:   Diagnosis Date    Acid reflux     Anxiety     RESOLVED: [id number]    Arthritis     Bipolar 2 disorder (HCC)     FOLLOWS WITH PSYCHIATRIST   CONTINUE LAMOTRIGINE; RESOLVED: [id number]    Depression     Familial tremor     both hands    Fibromyalgia     LAST ASSESSED: [id number]    Hearing aid worn     left ear    [place] (hard of hearing)     left ear    Hypertension     Left-sided weakness     Lower back pain     Memory loss of unknown cause     long and short term    Migraine     Obesity     Obesity, Class II, BMI 35-39 9     Overactive bladder     Panic attack     Post traumatic stress disorder     Seasonal allergies     Stroke [facility])     questionable stroke 2009    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    CONT ASA 81 MG DAILY; RESOLVED: [id number]    Urinary incontinence     Wears dentures     partial lower / full upper    Wears glasses      Past Surgical History:   Procedure Laterality Date    BACK SURGERY       SECTION      COLONOSCOPY      RESOLVED: [id number]    EAR SURGERY      EGD      HYSTERECTOMY      MYRINGOTOMY W/ TUBES Left     NECK SURGERY  2019    FL CYSTOURETHROSCOPY N/A 2016    Procedure: CYSTOSCOPY, BOTOX INJECTION;  Surgeon: Deyanira Colón MD;  Location: [facility];  Service: Gynecology    FL IMPLANT SPINAL NEUROSTIM/ Right 2/10/2021    Procedure: REPLACEMENT IMPLANTABLE PULSE GENERATOR DORSAL SPINAL COLUMN STIMULATOR, RIGHT;  Surgeon: Kumar Thomas MD;  Location:  MAIN OR;  Service: Neurosurgery    FL PERCUT IMPLNT Dolly Harpin Right 2020    Procedure: INSERTION THORACIC DORSAL COLUMN SPINAL CORD STIMULATOR PERCUTANEOUS W IMPLANTABLE PULSE GENERATOR, RIGHT;  Surgeon: Kumar Thomas MD;  Location:  MAIN OR;  Service: Neurosurgery    [address] [place] [address]    UPPER GASTROINTESTINAL ENDOSCOPY  2020     Social History     Socioeconomic History    Marital status:      Spouse name: Not on file    Number of children: 2    Years of education: graduate school     Highest education level: Not on file   Occupational History    Occupation: Disabled   Tobacco Use    Smoking status: Never Smoker    Smokeless tobacco: Never Used   Vaping Use    Vaping Use: Never used   Substance and Sexual Activity    Alcohol use: Not Currently     Comment: 2 x year; being a social drinker as per all scripts     Drug use: No    Sexual activity: Not Currently   Other Topics Concern    Not on file   Social History Narrative    Bereavement    Daily caffeine consumption, 6-8 servings per day     as per all scripts    Lives in [address] [place] [address] Determinants of Health     Financial Resource Strain: Medium Risk    Difficulty of Paying Living Expenses: Somewhat hard   Food Insecurity: No Food Insecurity    Worried About Running Out of Food in the Last Year: Never true    Daniel of Food in the Last Year: Never true   Transportation Needs: No Transportation Needs    Lack of Transportation (Medical): No    Lack of Transportation (Non-Medical): No   Physical Activity: Inactive    Days of Exercise per Week: 0 days    Minutes of Exercise per Session: 0 min   Stress:     Feeling of Stress :    Social Connections:  Moderately Isolated    Frequency of Communication with Friends and Family: More than three times a week    Frequency of Social Gatherings with Friends and Family: More than three times a week    Attends Cheondoism Services: More than 4 times per year    Active Member of [employer] Group or Organizations: No    Attends Club or Organization Meetings: Never    Marital Status:    Intimate Partner Violence: Not At Risk    Fear of Current or Ex-Partner: No    Emotionally Abused: No    Physically Abused: No    Sexually Abused: No     Family History   Problem Relation Age of Onset    Colon cancer Mother     Alzheimer's disease Father     Stroke Father     Colon cancer Brother     Bipolar disorder Brother     Breast cancer Maternal Aunt     Colon cancer Maternal Aunt     Heart disease Other     Diabetes Other     Hypertension Other     Seizures Son     Depression Paternal Grandfather     No Known Problems Sister     No Known Problems Brother     Thyroid disease Neg Hx      ==  Randall Jacobo MD  Chief Resident, PGY-3  [address] [facility]   [facility] , [address] [address] [facility] [address], [address] [facility]  Office: [phone]  Fax: [phone]

## 2021-07-13 ENCOUNTER — HOSPITAL ENCOUNTER (OUTPATIENT)
Facility: HOSPITAL | Age: 56
Setting detail: OBSERVATION
End: 2021-07-15
Attending: EMERGENCY MEDICINE | Admitting: INTERNAL MEDICINE
Payer: COMMERCIAL

## 2021-07-13 ENCOUNTER — TELEPHONE (OUTPATIENT)
Dept: INTERNAL MEDICINE CLINIC | Facility: CLINIC | Age: 56
End: 2021-07-13

## 2021-07-13 ENCOUNTER — PATIENT OUTREACH (OUTPATIENT)
Dept: INTERNAL MEDICINE CLINIC | Facility: CLINIC | Age: 56
End: 2021-07-13

## 2021-07-13 DIAGNOSIS — G89.29 CHRONIC BACK PAIN: ICD-10-CM

## 2021-07-13 DIAGNOSIS — Z86.73 HISTORY OF CVA (CEREBROVASCULAR ACCIDENT): ICD-10-CM

## 2021-07-13 DIAGNOSIS — M54.9 CHRONIC BACK PAIN: ICD-10-CM

## 2021-07-13 DIAGNOSIS — Z79.899 POLYPHARMACY: ICD-10-CM

## 2021-07-13 DIAGNOSIS — F31.81 BIPOLAR II DISORDER (HCC): ICD-10-CM

## 2021-07-13 DIAGNOSIS — F41.1 GENERALIZED ANXIETY DISORDER: ICD-10-CM

## 2021-07-13 DIAGNOSIS — F41.9 ANXIETY: ICD-10-CM

## 2021-07-13 DIAGNOSIS — F41.0 PANIC DISORDER WITHOUT AGORAPHOBIA: Primary | ICD-10-CM

## 2021-07-13 DIAGNOSIS — F33.1 MAJOR DEPRESSIVE DISORDER, RECURRENT EPISODE, MODERATE DEGREE (HCC): ICD-10-CM

## 2021-07-13 LAB
AMPHETAMINES UR QL SCN: NEGATIVE NG/ML
ANION GAP SERPL CALCULATED.3IONS-SCNC: 9 MMOL/L (ref 4–13)
BARBITURATES UR QL SCN: NEGATIVE NG/ML
BENZODIAZ UR QL: NEGATIVE NG/ML
BUN SERPL-MCNC: 7 MG/DL (ref 5–25)
BZE UR QL: NEGATIVE NG/ML
CALCIUM SERPL-MCNC: 8.9 MG/DL (ref 8.3–10.1)
CANNABINOIDS UR QL SCN: NEGATIVE NG/ML
CHLORIDE SERPL-SCNC: 110 MMOL/L (ref 100–108)
CO2 SERPL-SCNC: 22 MMOL/L (ref 21–32)
CREAT SERPL-MCNC: 0.63 MG/DL (ref 0.6–1.3)
ETHANOL EXG-MCNC: 0 MG/DL
GFR SERPL CREATININE-BSD FRML MDRD: 115 ML/MIN/1.73SQ M
GLUCOSE SERPL-MCNC: 104 MG/DL (ref 65–140)
MAGNESIUM SERPL-MCNC: 1.8 MG/DL (ref 1.6–2.6)
METHADONE UR QL SCN: NEGATIVE NG/ML
OPIATES UR QL: NEGATIVE NG/ML
PCP UR QL: NEGATIVE NG/ML
PLATELET # BLD AUTO: 348 THOUSANDS/UL (ref 149–390)
PMV BLD AUTO: 9.2 FL (ref 8.9–12.7)
POTASSIUM SERPL-SCNC: 3.4 MMOL/L (ref 3.5–5.3)
PROPOXYPH UR QL SCN: NEGATIVE NG/ML
SARS-COV-2 RNA RESP QL NAA+PROBE: NEGATIVE
SODIUM SERPL-SCNC: 141 MMOL/L (ref 136–145)

## 2021-07-13 PROCEDURE — 99218 PR INITIAL OBSERVATION CARE/DAY 30 MINUTES: CPT | Performed by: INTERNAL MEDICINE

## 2021-07-13 PROCEDURE — 99285 EMERGENCY DEPT VISIT HI MDM: CPT | Performed by: EMERGENCY MEDICINE

## 2021-07-13 PROCEDURE — 96374 THER/PROPH/DIAG INJ IV PUSH: CPT

## 2021-07-13 PROCEDURE — 96376 TX/PRO/DX INJ SAME DRUG ADON: CPT

## 2021-07-13 PROCEDURE — 96375 TX/PRO/DX INJ NEW DRUG ADDON: CPT

## 2021-07-13 PROCEDURE — U0003 INFECTIOUS AGENT DETECTION BY NUCLEIC ACID (DNA OR RNA); SEVERE ACUTE RESPIRATORY SYNDROME CORONAVIRUS 2 (SARS-COV-2) (CORONAVIRUS DISEASE [COVID-19]), AMPLIFIED PROBE TECHNIQUE, MAKING USE OF HIGH THROUGHPUT TECHNOLOGIES AS DESCRIBED BY CMS-2020-01-R: HCPCS

## 2021-07-13 PROCEDURE — 93005 ELECTROCARDIOGRAM TRACING: CPT

## 2021-07-13 PROCEDURE — 36415 COLL VENOUS BLD VENIPUNCTURE: CPT | Performed by: STUDENT IN AN ORGANIZED HEALTH CARE EDUCATION/TRAINING PROGRAM

## 2021-07-13 PROCEDURE — 82075 ASSAY OF BREATH ETHANOL: CPT

## 2021-07-13 PROCEDURE — 99284 EMERGENCY DEPT VISIT MOD MDM: CPT

## 2021-07-13 PROCEDURE — NC001 PR NO CHARGE: Performed by: INTERNAL MEDICINE

## 2021-07-13 PROCEDURE — 83735 ASSAY OF MAGNESIUM: CPT | Performed by: STUDENT IN AN ORGANIZED HEALTH CARE EDUCATION/TRAINING PROGRAM

## 2021-07-13 PROCEDURE — U0005 INFEC AGEN DETEC AMPLI PROBE: HCPCS

## 2021-07-13 PROCEDURE — 80048 BASIC METABOLIC PNL TOTAL CA: CPT | Performed by: STUDENT IN AN ORGANIZED HEALTH CARE EDUCATION/TRAINING PROGRAM

## 2021-07-13 PROCEDURE — 85049 AUTOMATED PLATELET COUNT: CPT | Performed by: STUDENT IN AN ORGANIZED HEALTH CARE EDUCATION/TRAINING PROGRAM

## 2021-07-13 RX ORDER — LORAZEPAM 0.5 MG/1
0.5 TABLET ORAL EVERY 6 HOURS PRN
Status: DISCONTINUED | OUTPATIENT
Start: 2021-07-13 | End: 2021-07-15 | Stop reason: HOSPADM

## 2021-07-13 RX ORDER — ACETAMINOPHEN 325 MG/1
975 TABLET ORAL ONCE
Status: COMPLETED | OUTPATIENT
Start: 2021-07-13 | End: 2021-07-13

## 2021-07-13 RX ORDER — PANTOPRAZOLE SODIUM 20 MG/1
20 TABLET, DELAYED RELEASE ORAL
Status: DISCONTINUED | OUTPATIENT
Start: 2021-07-14 | End: 2021-07-15 | Stop reason: HOSPADM

## 2021-07-13 RX ORDER — ACETAMINOPHEN 325 MG/1
975 TABLET ORAL EVERY 8 HOURS SCHEDULED
Status: DISCONTINUED | OUTPATIENT
Start: 2021-07-13 | End: 2021-07-15 | Stop reason: HOSPADM

## 2021-07-13 RX ORDER — MORPHINE SULFATE 10 MG/ML
6 INJECTION, SOLUTION INTRAMUSCULAR; INTRAVENOUS ONCE
Status: COMPLETED | OUTPATIENT
Start: 2021-07-13 | End: 2021-07-13

## 2021-07-13 RX ORDER — ONDANSETRON 4 MG/1
4 TABLET, ORALLY DISINTEGRATING ORAL EVERY 6 HOURS PRN
Status: DISCONTINUED | OUTPATIENT
Start: 2021-07-13 | End: 2021-07-15 | Stop reason: HOSPADM

## 2021-07-13 RX ORDER — POTASSIUM CHLORIDE 20 MEQ/1
40 TABLET, EXTENDED RELEASE ORAL ONCE
Status: COMPLETED | OUTPATIENT
Start: 2021-07-13 | End: 2021-07-13

## 2021-07-13 RX ORDER — AMOXICILLIN 250 MG
1 CAPSULE ORAL DAILY PRN
Status: DISCONTINUED | OUTPATIENT
Start: 2021-07-13 | End: 2021-07-15 | Stop reason: HOSPADM

## 2021-07-13 RX ORDER — QUETIAPINE FUMARATE 25 MG/1
100 TABLET, FILM COATED ORAL
Status: DISCONTINUED | OUTPATIENT
Start: 2021-07-13 | End: 2021-07-15 | Stop reason: HOSPADM

## 2021-07-13 RX ORDER — POTASSIUM CHLORIDE 20MEQ/15ML
20 LIQUID (ML) ORAL DAILY
Status: DISCONTINUED | OUTPATIENT
Start: 2021-07-14 | End: 2021-07-15 | Stop reason: HOSPADM

## 2021-07-13 RX ORDER — DULOXETIN HYDROCHLORIDE 60 MG/1
60 CAPSULE, DELAYED RELEASE ORAL DAILY
Status: DISCONTINUED | OUTPATIENT
Start: 2021-07-14 | End: 2021-07-15 | Stop reason: HOSPADM

## 2021-07-13 RX ORDER — ATORVASTATIN CALCIUM 40 MG/1
40 TABLET, FILM COATED ORAL
Status: DISCONTINUED | OUTPATIENT
Start: 2021-07-13 | End: 2021-07-15 | Stop reason: HOSPADM

## 2021-07-13 RX ORDER — FOLIC ACID 1 MG/1
1000 TABLET ORAL DAILY
Status: DISCONTINUED | OUTPATIENT
Start: 2021-07-14 | End: 2021-07-15 | Stop reason: HOSPADM

## 2021-07-13 RX ORDER — LORAZEPAM 0.5 MG/1
1 TABLET ORAL ONCE
Status: COMPLETED | OUTPATIENT
Start: 2021-07-13 | End: 2021-07-13

## 2021-07-13 RX ORDER — BUSPIRONE HYDROCHLORIDE 10 MG/1
20 TABLET ORAL 3 TIMES DAILY
Status: DISCONTINUED | OUTPATIENT
Start: 2021-07-13 | End: 2021-07-15 | Stop reason: HOSPADM

## 2021-07-13 RX ORDER — AMOXICILLIN 250 MG
1 CAPSULE ORAL
Status: DISCONTINUED | OUTPATIENT
Start: 2021-07-13 | End: 2021-07-13

## 2021-07-13 RX ORDER — AMLODIPINE BESYLATE 5 MG/1
5 TABLET ORAL DAILY
Status: DISCONTINUED | OUTPATIENT
Start: 2021-07-14 | End: 2021-07-15 | Stop reason: HOSPADM

## 2021-07-13 RX ORDER — LORAZEPAM 2 MG/ML
1 INJECTION INTRAMUSCULAR ONCE
Status: CANCELLED | OUTPATIENT
Start: 2021-07-13 | End: 2021-07-13

## 2021-07-13 RX ORDER — PRAZOSIN HYDROCHLORIDE 2 MG/1
2 CAPSULE ORAL DAILY
Status: DISCONTINUED | OUTPATIENT
Start: 2021-07-14 | End: 2021-07-15 | Stop reason: HOSPADM

## 2021-07-13 RX ORDER — LORAZEPAM 2 MG/ML
1 INJECTION INTRAMUSCULAR ONCE
Status: COMPLETED | OUTPATIENT
Start: 2021-07-13 | End: 2021-07-13

## 2021-07-13 RX ORDER — ASPIRIN 81 MG/1
81 TABLET, CHEWABLE ORAL DAILY
Status: DISCONTINUED | OUTPATIENT
Start: 2021-07-14 | End: 2021-07-15 | Stop reason: HOSPADM

## 2021-07-13 RX ORDER — PREGABALIN 50 MG/1
100 CAPSULE ORAL 2 TIMES DAILY
Status: DISCONTINUED | OUTPATIENT
Start: 2021-07-13 | End: 2021-07-15 | Stop reason: HOSPADM

## 2021-07-13 RX ADMIN — ATORVASTATIN CALCIUM 40 MG: 40 TABLET, FILM COATED ORAL at 18:45

## 2021-07-13 RX ADMIN — QUETIAPINE FUMARATE 100 MG: 25 TABLET ORAL at 22:40

## 2021-07-13 RX ADMIN — MORPHINE SULFATE 6 MG: 10 INJECTION INTRAVENOUS at 14:16

## 2021-07-13 RX ADMIN — ACETAMINOPHEN 975 MG: 325 TABLET, FILM COATED ORAL at 22:40

## 2021-07-13 RX ADMIN — BUSPIRONE HYDROCHLORIDE 20 MG: 10 TABLET ORAL at 21:04

## 2021-07-13 RX ADMIN — ACETAMINOPHEN 975 MG: 325 TABLET, FILM COATED ORAL at 15:26

## 2021-07-13 RX ADMIN — POTASSIUM CHLORIDE 40 MEQ: 1500 TABLET, EXTENDED RELEASE ORAL at 18:45

## 2021-07-13 RX ADMIN — LORAZEPAM 1 MG: 2 INJECTION INTRAMUSCULAR; INTRAVENOUS at 14:17

## 2021-07-13 RX ADMIN — MORPHINE SULFATE 6 MG: 10 INJECTION INTRAVENOUS at 15:27

## 2021-07-13 RX ADMIN — MAGNESIUM OXIDE TAB 400 MG (241.3 MG ELEMENTAL MG) 800 MG: 400 (241.3 MG) TAB at 21:04

## 2021-07-13 RX ADMIN — LORAZEPAM 1 MG: 0.5 TABLET ORAL at 13:47

## 2021-07-13 RX ADMIN — PREGABALIN 100 MG: 50 CAPSULE ORAL at 18:45

## 2021-07-13 NOTE — ED ATTENDING ATTESTATION
7/13/2021  IJori MD, saw and evaluated the patient  I have discussed the patient with the resident/non-physician practitioner and agree with the resident's/non-physician practitioner's findings, Plan of Care, and MDM as documented in the resident's/non-physician practitioner's note, except where noted  All available labs and Radiology studies were reviewed  I was present for key portions of any procedure(s) performed by the resident/non-physician practitioner and I was immediately available to provide assistance  At this point I agree with the current assessment done in the Emergency Department    I have conducted an independent evaluation of this patient a history and physical is as follows:  Here with c/o anxiety    On ativan and buspar patient is also on chronic narcotic therapy she states she is ran out of her morphine and her oxycodone and is not due for refills until the 19th she apparently has been taking more than she should be  She is not sure when she ran out  Patient has complaints of chronic back pain  On exam she is hyperventilating she has numbness in her face and numbness in her hands  Lungs clear heart regular mildly tachycardic abdomen soft nontender  Patient is somewhat distraught is awake alert able to answer questions  Impression I suspect she is having withdrawal symptoms from lack of narcotics  Patient is not suicidal or homicidal  ED Course     The patient's records were reviewed she had a blood workup in the emergency department low 2 days ago unremarkable   drug SCREEN   done yesterday at the  [facility] yesterday negative   Patient states they would not refill her narcotics   I reviewed the [place] narcotic database she last received a prescription for 30 tablets of morphine sulfate extended release  A total of 15 day supply on June 25th ,nothing since,   she also received a prescription for 15 tablets of 5 mg of oxycodone on the same date    the patient had been receiving a 15  day supply of narcotics every 2 weeks    Critical Care Time  Procedures    The patient is much improved after IV narcotics she is calmer heart rate has normalized she is no longer screaming out in pain  [facility] medical service has been consulted they are reviewing records

## 2021-07-13 NOTE — ASSESSMENT & PLAN NOTE
A:  Chronic pain uncontrolled     P:  Voltaren gel 1% topical 2g, 4 times a day  Duloxetine 60 mg  Pregabalin 100 mg b i d    Tylenol 971 mg q 8  Added Licodaine patch

## 2021-07-13 NOTE — ASSESSMENT & PLAN NOTE
A:   Presented with symptoms of panic attack:  Chest pain, nausea, sweating, numbness in the hands and feet, shortness of breath  Known history    P:  Ativan 0 5 mg q 6h p r n    Buspar 20 mg t i d   Monitor for symptoms    201 signed, patient will be transferred to inpatient psych facility

## 2021-07-13 NOTE — H&P
INTERNAL MEDICINE RESIDENCY ADMISSION H&P     Name: Osmani Olivo   Age & Sex: 60 y o  female   MRN: [id number]  Unit/Bed#: ED 07   Encounter: [id number]  Primary Care Provider: Beronica Montes MD    Code Status: Level 1 - Full Code  Admission Status: INPATIENT   Disposition: Patient requires Med/Surg    Admit to team: SOD Team A    ASSESSMENT/PLAN     Principal Problem:    Panic disorder without agoraphobia  Active Problems:    Chronic bilateral low back pain with bilateral sciatica    Chronic pain syndrome    Bipolar II disorder ([facility])    Hypokalemia    Opioid dependence ([facility] [address] )    Post traumatic stress disorder    Tardive dyskinesia    Nausea    History of CVA (cerebrovascular accident)      History of CVA (cerebrovascular accident)  Assessment & Plan  A:  Reported CVA in 2010    P:  Aspirin 81 mg daily  Lovastatin 40 mg daily      Nausea  Assessment & Plan  Nausea likely secondary to panic disorder or opiate withdrawal    Zofran 4 mg q 6h p r n  Tardive dyskinesia  Assessment & Plan  Valbenazine 80 mg as per home regimen    Post traumatic stress disorder  Assessment & Plan  Prazosin 2 mg PTSD    Opioid dependence ([facility])  Assessment & Plan  A:   COWS Score 4  History of opioid dependence for chronic pain  Per PDMP, last refill 06/25/2021  She was admitted to the hospital 06/27/2021 and was discharged on 07/07/2021  Due to stating they still have pain medication at home, she was not discharged on any new scripts after her last visit  The patient currently reports running out of her home medications, expected refill date 07/19/2021 per Dr Jose Willis  P:  Monitor for signs of withdrawal  Treat for withdrawal accordingly as necessary  Not on home opioids  Senokot 8 6-50 mg p r n  for chronic constipation secondary to opioid use        Hypokalemia  Assessment & Plan  A: Potassium 3 4 on admission    P:  Repleted with K-Dur 40 mEq  Potassium chloride 20 mEq capsule daily starting tomorrow  Recheck BMP in a m     Bipolar II disorder (HCC)  Assessment & Plan  History of bipolar 2 disorder controlled on home medications    Quetiapine 100 mg daily at bedtime    Chronic pain syndrome  Assessment & Plan  A:  Chronic pain    P:  Voltaren gel 1% topical 2g, 4 times a day  Duloxetine 60 mg  Pregabalin 100 mg b i d  Tylenol 975 mg q 8    Chronic bilateral low back pain with bilateral sciatica  Assessment & Plan  A:  History of lumbar stenosis  Status post spinal cord stimulator    P:  See chronic pain syndrome    * Panic disorder without agoraphobia  Assessment & Plan  A:   Presented with symptoms of panic attack:  Chest pain, nausea, sweating, numbness in the hands and feet, shortness of breath  Known history    P:  Ativan 0 5 mg q 6h p r n  Buspar 20 mg t i d   Monitor for symptoms        VTE Pharmacologic Prophylaxis: Enoxaparin (Lovenox)  VTE Mechanical Prophylaxis: sequential compression device    CHIEF COMPLAINT     Chief Complaint   Patient presents with    Anxiety     pt states " im having another panic attack    my dr said if it happens again i need to go to a facility, i want to go to [facility]"      Hugo Ni is a 60 y o  female with a past medical history significant for bipolar 2 disorder, panic attacks, depression and chronic back pain  She presented to the ED via EMS due to panic attacks  She was recently discharged from [facility] on 7/07 and was previously admitted for encephalopathy likely due to polypharmacy  She reports she has been having panic attacks everyday- ongoing for a long time, but worsening for the past month  During the panic attacks, she endorses that she has trouble breathing, has chest pain, numbness in hands and feet, as well as hyperventilation and palpitations  With the attacks, she also reports feeling shaky, sweaty and was unable to talk   Prior to arrival and on admission, she endorses palpitations, nausea and vomiting but currently does not  She reports trying to turn on air conditioning and taking deep breaths, but without improvement of symptoms  The panic attacks come out of nowhere, and nothing makes them better or worse  She reports that she saw her psychiatrist recently, sees him on a monthly basis, and she's managed on Ativan   5mg every 6hrs as needed, as well as Buspar, which was just increased from 15mg TID to 20mg TID by her psychiatrist  [facility] pharmacy and confirmed the patient's home medications  She reports that she was only able to take two doses of 20mg of buspar yesterday, and only took one dose of 20mg today  In addition, she has chronic back pain s/p surgery in 2019 to place spinal cord stimulator  She doesn't remember the last time that she took morphine  In the ED, she was given Acetaminophen 975mg, Lorazepam 1mg x2, as well as Morphine 6mg injection x2  After that, she reports feeling a little better  Currently, she is not short of breath, no longer having palpitations, no longer nauseous  She still endorses a sore chest as well as pain and stiffness in her back but states the tylenol helped a little  Patient was admited for further management of panic attacks and intractable back pain  Post 12mg morphine and 2mg IV ativan, COWS score 5  Home Medications confirmed per pharmacy:  Buspar 20 mg t i d   Lorazepam 0 5 mg q 6 hours  Potassium chloride 20 mEq daily  Pantoprazole 20 mg daily  Atorvastatin 40 mg daily  Seroquel 100 mg daily  Pregabalin 100 mg b i d   Voltaren gel 2 g, 4 times a day  Morphine sulfate 15 mg b i d  Amlodipine 5 mg daily  Hydroxyzine was canceled but patient picked up 50 mg q d  before the cancellation    REVIEW OF SYSTEMS     Review of Systems   Constitutional: Positive for fatigue  Negative for chills and fever  HENT: Negative for rhinorrhea  Respiratory: Negative for chest tightness and shortness of breath  Cardiovascular: Positive for chest pain   Negative for palpitations  Chest pain from trouble breathing   Gastrointestinal: Positive for abdominal pain and constipation  Negative for diarrhea and nausea  Soreness in the lower abdomen   Musculoskeletal: Positive for back pain  Neurological: Negative for dizziness, tremors, weakness, light-headedness and headaches  Psychiatric/Behavioral: Negative for agitation, confusion and hallucinations  The patient is nervous/anxious  All other systems reviewed and are negative  OBJECTIVE     Vitals:    21 1335 21 1338 21 1521   BP:  165/86 140/81   BP Location:   Right arm   Pulse: (!) 140  103   Resp: 20  18   Temp: 98 3 °F (36 8 °C)     TempSrc: Oral     SpO2: 100%  100%   Weight: 89 8 kg (198 lb)        Temperature:   Temp (24hrs), Av 3 °F (36 8 °C), Min:98 3 °F (36 8 °C), Max:98 3 °F (36 8 °C)    Temperature: 98 3 °F (36 8 °C)  Intake & Output:  I/O     None        Weights:        Body mass index is 37 41 kg/m²  Weight (last 2 days)     Date/Time   Weight    21 1335   89 8 (198)            Physical Exam  Vitals reviewed  Constitutional:       General: She is not in acute distress  Appearance: Normal appearance  HENT:      Head: Normocephalic and atraumatic  Cardiovascular:      Rate and Rhythm: Normal rate and regular rhythm  Pulses: Normal pulses  Heart sounds: Normal heart sounds  No murmur heard  Pulmonary:      Effort: Pulmonary effort is normal  No respiratory distress  Breath sounds: Normal breath sounds  Abdominal:      General: Abdomen is flat  Bowel sounds are normal  There is no distension  Palpations: Abdomen is soft  Tenderness: There is no abdominal tenderness  There is no guarding  Musculoskeletal:         General: No swelling  Right lower leg: No edema  Left lower leg: No edema  Neurological:      Mental Status: She is alert  Mental status is at baseline        Comments: Tardive dyskinesia   Psychiatric: Mood and Affect: Mood normal          Behavior: Behavior normal        PAST MEDICAL HISTORY     Past Medical History:   Diagnosis Date    Acid reflux     Anxiety     RESOLVED: [id number]    Arthritis     Bipolar 2 disorder (HCC)     FOLLOWS WITH PSYCHIATRIST  CONTINUE LAMOTRIGINE; RESOLVED: [id number]    Depression     Familial tremor     both hands    Fibromyalgia     LAST ASSESSED: [id number]    Hearing aid worn     left ear    [place] (hard of hearing)     left ear    Hypertension     Left-sided weakness     Lower back pain     Memory loss of unknown cause     long and short term    Migraine     Obesity     Obesity, Class II, BMI 35-39 9     Overactive bladder     Panic attack     Post traumatic stress disorder     Seasonal allergies     Stroke [facility])     questionable stroke 2009    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    CONT ASA 81 MG DAILY; RESOLVED: [id number]    Urinary incontinence     Wears dentures     partial lower / full upper    Wears glasses      PAST SURGICAL HISTORY     Past Surgical History:   Procedure Laterality Date    BACK SURGERY       SECTION      COLONOSCOPY      RESOLVED: [id number]    EAR SURGERY      EGD      HYSTERECTOMY      MYRINGOTOMY W/ TUBES Left     NECK SURGERY  2019    MS CYSTOURETHROSCOPY N/A 2016    Procedure: CYSTOSCOPY, BOTOX INJECTION;  Surgeon: Damian Wiseman MD;  Location: [facility];  Service: Gynecology    MS IMPLANT SPINAL NEUROSTIM/ Right 2/10/2021    Procedure: REPLACEMENT IMPLANTABLE PULSE GENERATOR DORSAL SPINAL COLUMN STIMULATOR, RIGHT;  Surgeon: Yanely Birch MD;  Location: [facility];  Service: Neurosurgery    MS PERCUT IMPLNT Shaylee Neville Right 2020    Procedure: INSERTION THORACIC DORSAL COLUMN SPINAL CORD STIMULATOR PERCUTANEOUS W IMPLANTABLE PULSE GENERATOR, RIGHT;  Surgeon: Yanely Birch MD;  Location: [facility];  Service: Neurosurgery    TONSILLECTOMY      TUBAL LIGATION  1986    UPPER GASTROINTESTINAL ENDOSCOPY  09/2020     SOCIAL & FAMILY HISTORY     Social History     Substance and Sexual Activity   Alcohol Use Not Currently    Comment: 2 x year; being a social drinker as per all scripts      Social History     Substance and Sexual Activity   Drug Use No     Social History     Tobacco Use   Smoking Status Never Smoker   Smokeless Tobacco Never Used     Family History   Problem Relation Age of Onset    Colon cancer Mother     Alzheimer's disease Father     Stroke Father     Colon cancer Brother     Bipolar disorder Brother     Breast cancer Maternal Aunt     Colon cancer Maternal Aunt     Heart disease Other     Diabetes Other     Hypertension Other     Seizures Son     Depression Paternal Grandfather     No Known Problems Sister     No Known Problems Brother     Thyroid disease Neg Hx      LABORATORY DATA     Labs: I have personally reviewed pertinent reports  Results from last 7 days   Lab Units 07/13/21  1753 07/11/21  1710   WBC Thousand/uL  --  6 54   HEMOGLOBIN g/dL  --  14 0   HEMATOCRIT %  --  41 4   PLATELETS Thousands/uL 348 402*   NEUTROS PCT %  --  79*   MONOS PCT %  --  5      Results from last 7 days   Lab Units 07/13/21  1752 07/12/21  1123 07/11/21  1710   POTASSIUM mmol/L 3 4* 3 3* 3 2*   CHLORIDE mmol/L 110* 108 110*   CO2 mmol/L 22 21 18*   BUN mg/dL 7 5 7   CREATININE mg/dL 0 63 0 74 0 85   CALCIUM mg/dL 8 9 9 0 9 1   ALK PHOS U/L  --   --  104   ALT U/L  --   --  18   AST U/L  --   --  12     Results from last 7 days   Lab Units 07/13/21  1752 07/12/21  1123   MAGNESIUM mg/dL 1 8 1 9                      Micro:  Lab Results   Component Value Date    BLOODCX No Growth After 5 Days  07/01/2021    BLOODCX No Growth After 5 Days  07/01/2021    BLOODCX No Growth After 5 Days  06/29/2021     IMAGING & DIAGNOSTIC TESTS     Imaging: I have personally reviewed pertinent reports  No results found    EKG, Pathology, and Other Studies: I have personally reviewed pertinent reports  ALLERGIES   No Known Allergies  MEDICATIONS PRIOR TO ARRIVAL     Prior to Admission medications    Medication Sig Start Date End Date Taking?  Authorizing Provider   acetaminophen (TYLENOL) 325 mg tablet Take 2 tablets (650 mg total) by mouth every 8 (eight) hours as needed for mild pain 3/9/21   Glenn Lugo DO   amLODIPine (NORVASC) 5 mg tablet Take 1 tablet (5 mg total) by mouth daily 6/5/21 9/3/21  Mark Vila MD   aspirin 81 mg chewable tablet Chew 1 tablet (81 mg total) daily 7/8/21   Carlie Melendez DO   atorvastatin (LIPITOR) 40 mg tablet Take 1 tablet (40 mg total) by mouth daily with dinner 7/7/21   Cathy Apley, DO   busPIRone (BUSPAR) 15 mg tablet Take 1 tablet (15 mg total) by mouth 3 (three) times a day 7/7/21   Cathy Apley, DO   [facility] Vitamin C 500 MG tablet TAKE 1 TABLET BY MOUTH TWICE A DAY 4/14/21   Mark Vila MD   Diapers & Supplies (Huggies Pull-Ups) MISC Use 3 (three) times a day 4/20/21   Ana María Tapia DO   Diclofenac Sodium (VOLTAREN) 1 % Apply 2 g topically 4 (four) times a day 6/28/21   Oxana Parra MD   DULoxetine (CYMBALTA) 60 mg delayed release capsule TAKE 1 CAPSULE BY MOUTH EVERY DAY 4/14/21   Mark Vila MD   ferrous sulfate 324 (65 Fe) mg TAKE 1 TABLET (324 MG TOTAL) BY MOUTH 2 (TWO) TIMES A DAY BEFORE MEALS 4/13/21   Mark Vila MD   folic acid (FOLVITE) 1 mg tablet TAKE 1 TABLET BY MOUTH EVERY DAY 4/14/21   Mark Vila MD   glycerin-hypromellose- (ARTIFICIAL TEARS) 0 2-0 2-1 % SOLN Administer 1 drop to both eyes 2 (two) times a day  Patient not taking: Reported on 7/12/2021 7/7/21   Cathy Apley, DO   ipratropium-albuterol (DUO-NEB) 0 5-2 5 mg/3 mL nebulizer solution Take 1 vial (3 mL total) by nebulization every 6 (six) hours as needed for wheezing or shortness of breath  Patient not taking: Reported on 6/10/2021 3/29/21   Aleksander Menard,    lidocaine (LMX) 4 % cream Apply topically as needed for mild pain  Patient not taking: Reported on 7/12/2021 7/16/20   Sherwin Collins DO   LORazepam (ATIVAN) 0 5 mg tablet Take 1 tablet (0 5 mg total) by mouth every 6 (six) hours  Patient taking differently: Take 0 5 mg by mouth 3 (three) times a day  7/7/21   Reymundo Mott DO   morphine (MS CONTIN) 15 mg 12 hr tablet Take 1 tablet (15 mg total) by mouth 2 (two) times a day Hold if sedatedMax Daily Amount: 30 mg 6/25/21   Deena Hannah MD   Mouthwashes (Biotene Dry Mouth) LIQD Apply 5 mL to the mouth or throat daily     Historical Provider, MD   Multiple Vitamins-Minerals (multivitamin with minerals) tablet Take 1 tablet by mouth daily  Patient not taking: Reported on 6/27/2021 8/18/20   Tato Barahona MD   naloxone [facility]) 4 mg/0 1 mL nasal spray Administer 1 spray into a nostril  If no response after 2-3 minutes, give another dose in the other nostril using a new spray  3/29/21   Henna Mejia DO   ondansetron (ZOFRAN-ODT) 4 mg disintegrating tablet Take 1 tablet (4 mg total) by mouth every 6 (six) hours as needed for nausea or vomiting  Patient not taking: Reported on 6/27/2021 9/17/20   Chet Aldana DO   oxyCODONE (ROXICODONE) 5 mg immediate release tablet Take 1 tablet (5 mg total) by mouth dailyMax Daily Amount: 5 mg 6/25/21   Deena Hannah MD   pantoprazole (PROTONIX) 20 mg tablet Take 1 tablet (20 mg total) by mouth daily in the early morning 7/8/21   Carlie Melendez DO   Polyethylene Glycol 3350 (MIRALAX PO) Take by mouth  Patient not taking: Reported on 7/8/2021    Historical Provider, MD   potassium chloride (MICRO-K) 10 MEQ CR capsule Take 2 capsules (20 mEq total) by mouth daily Take 2 tablets (20 meq total) daily   7/8/21   Carlie Melendez DO   prazosin (MINIPRESS) 2 mg capsule TAKE 1 CAPSULE BY MOUTH EVERY DAY 7/9/20   Madyson Valerio MD   pregabalin (LYRICA) 100 mg capsule Take 1 capsule (100 mg total) by mouth 2 (two) times a day 7/7/21 8/6/21  Addi Sharpe MD   QUEtiapine (SEROquel) 100 mg tablet Take 1 tablet (100 mg total) by mouth daily at bedtime 7/7/21   Buffy Briones DO   Respiratory Therapy Supplies (Nebulizer) YUDY Use as needed (Shortness of breath)  Patient not taking: Reported on 4/20/2021 3/29/21   Kandi Charles DO   senna-docusate sodium (SENOKOT S) 8 6-50 mg per tablet Take 1 tablet by mouth daily at bedtime  Patient not taking: Reported on 7/12/2021 7/7/21   Buffy Briones DO   Valbenazine Tosylate (Ingrezza) 80 MG CAPS Take 80 mg by mouth daily 3/17/21   Avelino Fisher MD     MEDICATIONS ADMINISTERED IN LAST 24 HOURS     Medication Administration - last 24 hours from 07/12/2021 1911 to 07/13/2021 1911       Date/Time Order Dose Route Action Action by     07/13/2021 1347 LORazepam (ATIVAN) tablet 1 mg 1 mg Oral Given Bunny Johnson RN     07/13/2021 1417 LORazepam (ATIVAN) injection 1 mg 1 mg Intravenous Given Johnny Oneil RN     07/13/2021 1416 morphine (PF) 10 mg/mL injection 6 mg 6 mg Intravenous Given Johnny Oneil RN     07/13/2021 1527 morphine (PF) 10 mg/mL injection 6 mg 6 mg Intravenous Given Janae Oliveira RN     07/13/2021 1526 acetaminophen (TYLENOL) tablet 975 mg 975 mg Oral Given Janae Oliveira RN     07/13/2021 1845 atorvastatin (LIPITOR) tablet 40 mg 40 mg Oral Given Janae Oliveira RN     07/13/2021 1845 pregabalin (LYRICA) capsule 100 mg 100 mg Oral Given Janae Oliveira RN     07/13/2021 1735 acetaminophen (TYLENOL) tablet 975 mg 975 mg Oral Not Given Janae Oliveira RN     07/13/2021 1845 potassium chloride (K-DUR,KLOR-CON) CR tablet 40 mEq 40 mEq Oral Given Janae Oliveira RN        CURRENT MEDICATIONS     Current Facility-Administered Medications   Medication Dose Route Frequency Provider Last Rate    acetaminophen  975 mg Oral [facility] Carlie Melendez DO      [START ON 7/14/2021] amLODIPine  5 mg Oral Daily Carlie Melendez DO      [START ON 7/14/2021] aspirin  81 mg Oral Daily Carlie Melendez DO      atorvastatin  40 mg Oral Daily With [employer] CompanyDO  busPIRone  20 mg Oral TID Pam Herbert, DO      Diclofenac Sodium  2 g Topical 4x Daily Carlie Saraiya, DO      [START ON 7/14/2021] DULoxetine  60 mg Oral Daily Carlie Saraiya, DO      [START ON 7/14/2021] enoxaparin  40 mg Subcutaneous Daily Carlie Saraiya, DO      [START ON 1/83/3645] folic acid  8,697 mcg Oral Daily Carlie Saraiya, DO      LORazepam  0 5 mg Oral Q6H PRN Carlie Saraiya, DO      ondansetron  4 mg Oral Q6H PRN Carlie Saraiya, DO      [START ON 7/14/2021] pantoprazole  20 mg Oral Early Morning Carlie Saraiya, DO      [START ON 7/14/2021] potassium chloride  20 mEq Oral Daily Carlie Saraiya, DO      [START ON 7/14/2021] prazosin  2 mg Oral Daily Carlie Saraiya, DO      pregabalin  100 mg Oral BID Carlie Saraiya, DO      QUEtiapine  100 mg Oral HS Carlie Saraiya, DO      senna-docusate sodium  1 tablet Oral Daily PRN Sophia Vazquez, DO      [START ON 7/14/2021] Valbenazine Tosylate  80 mg Oral Daily Carlie Saraiya, DO          LORazepam, 0 5 mg, Q6H PRN  ondansetron, 4 mg, Q6H PRN  senna-docusate sodium, 1 tablet, Daily PRN        Admission Time  I spent 45 minutes admitting the patient  This involved direct patient contact where I performed a full history and physical, reviewing previous records, and reviewing laboratory and other diagnostic studies  Portions of the record may have been created with voice recognition software  Occasional wrong word or "sound a like" substitutions may have occurred due to the inherent limitations of voice recognition software    Read the chart carefully and recognize, using context, where substitutions have occurred     ==  Sophia Vazquez, [address] [place] [address]  Internal Medicine Residency PGY-1

## 2021-07-13 NOTE — ASSESSMENT & PLAN NOTE
A: Potassium 3 4 on admission, 4 1 today       P:   Potassium chloride 20 mEq capsule daily starting tomorrow  Continue to monitor with daily BMP checks  Replete as needed

## 2021-07-13 NOTE — PROGRESS NOTES
SW received return call from [address] to relate he will be following upon pt' s ICM referral and pt has declined the [facility] team referral    Sw has shared pt is considering return to [address] as pt reports she is afraid to be alone  We have been working on the [employer] as well  As per conversation with pt SW has asked if their Transitional Care Program was an option  He relates he would look into same and f/u with pt/son  HIRAM notes pt has gone to the ED this afternoon  SW will remain avaialble to assist as able

## 2021-07-13 NOTE — ED PROVIDER NOTES
History  Chief Complaint   Patient presents with    Anxiety     pt states " im having another panic attack    my dr said if it happens again i need to go to a facility, i want to go to [address] [place] [address]"     Samantha is a 25-year-old woman who presents with complaints of a panic attack, shortness of breath, and "pain everywhere " She has had panic attacks like this before and states she took 0 5mg Ativan and 20mg Buspar this morning to help without relief  Patient lives with her son who was concerned about her this afternoon and contacted EMS to bring her to the hospital  She states she has chronic back pain that is bothering her  She receives morphine and oxycodone from her PCP  She says she is due for a new script on 7/19 but ran out  She states she has a psychiatric history of anxiety and depression, and medical issues of hypertension and chronic pain  Patient seen in ED 2 days ago and in the office yesterday for similar complaints  UDS yesterday was negative  Prior to Admission Medications   Prescriptions Last Dose Informant Patient Reported? Taking?    [facility] Vitamin C 500 MG tablet   No No   Sig: TAKE 1 TABLET BY MOUTH TWICE A DAY   DULoxetine (CYMBALTA) 60 mg delayed release capsule   No No   Sig: TAKE 1 CAPSULE BY MOUTH EVERY DAY   Diapers & Supplies (Huggies Pull-Ups) MISC   No No   Sig: Use 3 (three) times a day   Diclofenac Sodium (VOLTAREN) 1 %   No No   Sig: Apply 2 g topically 4 (four) times a day   LORazepam (ATIVAN) 0 5 mg tablet  Self No No   Sig: Take 1 tablet (0 5 mg total) by mouth every 6 (six) hours   Patient taking differently: Take 0 5 mg by mouth 3 (three) times a day    Mouthwashes (Biotene Dry Mouth) LIQD   Yes No   Sig: Apply 5 mL to the mouth or throat daily    Multiple Vitamins-Minerals (multivitamin with minerals) tablet  Outside Facility (Specify) No No   Sig: Take 1 tablet by mouth daily   Patient not taking: Reported on 6/27/2021   Polyethylene Glycol 3350 (MIRALAX PO)  Outside Facility (Specify) Yes No   Sig: Take by mouth   Patient not taking: Reported on 7/8/2021   QUEtiapine (SEROquel) 100 mg tablet   No No   Sig: Take 1 tablet (100 mg total) by mouth daily at bedtime   Respiratory Therapy Supplies (Nebulizer) YUDY   No No   Sig: Use as needed (Shortness of breath)   Patient not taking: Reported on 4/20/2021   Valbenazine Tosylate (Ingrezza) 80 MG CAPS   No No   Sig: Take 80 mg by mouth daily   acetaminophen (TYLENOL) 325 mg tablet   No No   Sig: Take 2 tablets (650 mg total) by mouth every 8 (eight) hours as needed for mild pain   amLODIPine (NORVASC) 5 mg tablet   No No   Sig: Take 1 tablet (5 mg total) by mouth daily   aspirin 81 mg chewable tablet   No No   Sig: Chew 1 tablet (81 mg total) daily   atorvastatin (LIPITOR) 40 mg tablet   No No   Sig: Take 1 tablet (40 mg total) by mouth daily with dinner   busPIRone (BUSPAR) 15 mg tablet   No No   Sig: Take 1 tablet (15 mg total) by mouth 3 (three) times a day   ferrous sulfate 324 (65 Fe) mg   No No   Sig: TAKE 1 TABLET (324 MG TOTAL) BY MOUTH 2 (TWO) TIMES A DAY BEFORE MEALS   folic acid (FOLVITE) 1 mg tablet   No No   Sig: TAKE 1 TABLET BY MOUTH EVERY DAY   glycerin-hypromellose- (ARTIFICIAL TEARS) 0 2-0 2-1 % SOLN   No No   Sig: Administer 1 drop to both eyes 2 (two) times a day   Patient not taking: Reported on 7/12/2021   ipratropium-albuterol (DUO-NEB) 0 5-2 5 mg/3 mL nebulizer solution   No No   Sig: Take 1 vial (3 mL total) by nebulization every 6 (six) hours as needed for wheezing or shortness of breath   Patient not taking: Reported on 6/10/2021   lidocaine (LMX) 4 % cream  Outside Facility (Specify) No No   Sig: Apply topically as needed for mild pain   Patient not taking: Reported on 7/12/2021   morphine (MS CONTIN) 15 mg 12 hr tablet   No No   Sig: Take 1 tablet (15 mg total) by mouth 2 (two) times a day Hold if sedatedMax Daily Amount: 30 mg   naloxone (NARCAN) 4 mg/0 1 mL nasal spray   No No   Sig: Administer 1 spray into a nostril  If no response after 2-3 minutes, give another dose in the other nostril using a new spray  ondansetron (ZOFRAN-ODT) 4 mg disintegrating tablet  Outside Facility (Specify) No No   Sig: Take 1 tablet (4 mg total) by mouth every 6 (six) hours as needed for nausea or vomiting   Patient not taking: Reported on 6/27/2021   oxyCODONE (ROXICODONE) 5 mg immediate release tablet   No No   Sig: Take 1 tablet (5 mg total) by mouth dailyMax Daily Amount: 5 mg   pantoprazole (PROTONIX) 20 mg tablet   No No   Sig: Take 1 tablet (20 mg total) by mouth daily in the early morning   potassium chloride (MICRO-K) 10 MEQ CR capsule   No No   Sig: Take 2 capsules (20 mEq total) by mouth daily Take 2 tablets (20 meq total) daily  prazosin (MINIPRESS) 2 mg capsule  Outside Facility (Specify) No No   Sig: TAKE 1 CAPSULE BY MOUTH EVERY DAY   pregabalin (LYRICA) 100 mg capsule   No No   Sig: Take 1 capsule (100 mg total) by mouth 2 (two) times a day   senna-docusate sodium (SENOKOT S) 8 6-50 mg per tablet   No No   Sig: Take 1 tablet by mouth daily at bedtime   Patient not taking: Reported on 7/12/2021      Facility-Administered Medications: None       Past Medical History:   Diagnosis Date    Acid reflux     Anxiety     RESOLVED: [id number]    Arthritis     Bipolar 2 disorder (HCC)     FOLLOWS WITH PSYCHIATRIST  CONTINUE LAMOTRIGINE; RESOLVED: [id number]    Depression     Familial tremor     both hands    Fibromyalgia     LAST ASSESSED: [id number]    Hearing aid worn     left ear    [place] (hard of hearing)     left ear    Hypertension     Left-sided weakness     Lower back pain     Memory loss of unknown cause     long and short term    Migraine     Obesity     Obesity, Class II, BMI 35-39 9     Overactive bladder     Panic attack     Post traumatic stress disorder     Seasonal allergies     Stroke 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  CONT ASA 81 MG DAILY; RESOLVED: [id number]    Urinary incontinence     Wears dentures     partial lower / full upper    Wears glasses        Past Surgical History:   Procedure Laterality Date    BACK SURGERY       SECTION      COLONOSCOPY      RESOLVED: [id number]    EAR SURGERY      EGD      HYSTERECTOMY  2004    MYRINGOTOMY W/ TUBES Left     NECK SURGERY  2019    AL CYSTOURETHROSCOPY N/A 2016    Procedure: CYSTOSCOPY, BOTOX INJECTION;  Surgeon: Glendy Deras MD;  [facility];  Service: Gynecology    AL IMPLANT SPINAL NEUROSTIM/ Right 2/10/2021    Procedure: REPLACEMENT IMPLANTABLE PULSE GENERATOR DORSAL SPINAL COLUMN STIMULATOR, RIGHT;  Surgeon: Bronson Lira MD;  Location:  MAIN OR;  Service: Neurosurgery    AL PERCUT IMPLNT [address] Right 2020    Procedure: INSERTION THORACIC DORSAL COLUMN SPINAL CORD STIMULATOR PERCUTANEOUS W IMPLANTABLE PULSE GENERATOR, RIGHT;  Surgeon: Bronson Lira MD;  Location:  MAIN OR;  Service: Neurosurgery    [facility]    UPPER GASTROINTESTINAL ENDOSCOPY  2020       Family History   Problem Relation Age of Onset    Colon cancer Mother     Alzheimer's disease Father     Stroke Father     Colon cancer Brother     Bipolar disorder Brother     Breast cancer Maternal Aunt     Colon cancer Maternal Aunt     Heart disease Other     Diabetes Other     Hypertension Other     Seizures Son     Depression Paternal Grandfather     No Known Problems Sister     No Known Problems Brother     Thyroid disease Neg Hx      I have reviewed and agree with the history as documented      E-Cigarette/Vaping    E-Cigarette Use Never User      E-Cigarette/Vaping Substances    Nicotine No     THC No     CBD No     Flavoring No     Other No     Unknown No      Social History     Tobacco Use    Smoking status: Never Smoker    Smokeless tobacco: Never Used   Vaping Use    Vaping Use: Never used   Substance Use Topics    Alcohol use: Not Currently     Comment: 2 x year; being a social drinker as per all scripts     Drug use: No        Review of Systems   Unable to perform ROS: Psychiatric disorder       Physical Exam  ED Triage Vitals   Temperature Pulse Respirations Blood Pressure SpO2   07/13/21 1335 07/13/21 1335 07/13/21 1335 07/13/21 1338 07/13/21 1335   98 3 °F (36 8 °C) (!) 140 20 165/86 100 %      Temp Source Heart Rate Source Patient Position - Orthostatic VS BP Location FiO2 (%)   07/13/21 1335 07/13/21 1335 07/13/21 1521 07/13/21 1521 --   Oral Monitor Lying Right arm       Pain Score       07/13/21 1416       Worst Possible Pain             Orthostatic Vital Signs  Vitals:    07/13/21 1335 07/13/21 1338 07/13/21 1521   BP:  165/86 140/81   Pulse: (!) 140  103   Patient Position - Orthostatic VS:   Lying       Physical Exam  Vitals and nursing note reviewed  Constitutional:       General: She is in acute distress  Appearance: She is not ill-appearing  HENT:      Head: Normocephalic and atraumatic  Right Ear: External ear normal       Left Ear: External ear normal       Nose: Nose normal       Mouth/Throat:      Dentition: Abnormal dentition  Eyes:      Extraocular Movements: Extraocular movements intact  Cardiovascular:      Rate and Rhythm: Tachycardia present  Pulses: Normal pulses  Abdominal:      General: There is no distension  Palpations: Abdomen is soft  Skin:     General: Skin is warm and dry  Neurological:      Mental Status: She is alert  Psychiatric:         Mood and Affect: Mood is anxious  Behavior: Behavior is agitated           ED Medications  Medications   LORazepam (ATIVAN) tablet 1 mg (1 mg Oral Given 7/13/21 1347)   LORazepam (ATIVAN) injection 1 mg (1 mg Intravenous Given 7/13/21 1417)   morphine (PF) 10 mg/mL injection 6 mg (6 mg Intravenous Given 7/13/21 1416)   morphine (PF) 10 mg/mL injection 6 mg (6 mg Intravenous Given 7/13/21 1527)   acetaminophen (TYLENOL) tablet 975 mg (975 mg Oral Given 7/13/21 1526)       Diagnostic Studies  Results Reviewed     Procedure Component Value Units Date/Time    POCT alcohol breath test [[id number]]  (Normal) Resulted: 07/13/21 1655    Lab Status: Final result Updated: 07/13/21 1655     EXTBreath Alcohol 0 000    [facility]ov[facility][id number] Collected: 07/13/21 1645    Lab Status: In process Specimen: Nares from Nasopharyngeal Swab Updated: 07/13/21 1648                 No orders to display         Procedures  Procedures      ED Course  ED Course as of Jul 13 1705   Tue Jul 13, 2021   1638  Re-evaluated after administration pain medication  She appears calm, states her pain and anxiety feel better  Discussed case with crisis for possible inpatient management  Covid swab, EKG, blood alcohol ordered for patient to be eligible for inpatient  CBC, BMP, TSH ordered within past 48 hours, not repeated  MDM  Number of Diagnoses or Management Options  Anxiety  Chronic back pain  Major depressive disorder, recurrent episode, moderate degree (HCC)  Panic disorder without agoraphobia  Polypharmacy  Diagnosis management comments: Patient is a 78-year-old female with past medical history anxiety, depression, chronic pain presenting with symptoms of a panic attack  Patient was seen in the ED 2 days ago with similar complaint and in the office yesterday  Concern for opioid withdrawal as UDS yesterday was negative, but is supposed to be taking morphine and oxycodone  SOD consulted to help sort her pain regimen  Per PDMP records, patient given morphine on 6/25 for 15 days, which supports story that patient has run out of medication  Given 1 milligram Ativan p o  and 1mg IV to help anxiety  6 milligrams IV morphine given for pain x 2      Crisis consulted for possible inpatient management of anxiety and panic attacks, as she has been seen 3 days in a row for these symptoms without prolonged relief  Given recent admission for polypharmacy and continual anxiety attacks, decision was made to admit patient to SOD to obvs         Amount and/or Complexity of Data Reviewed  Clinical lab tests: ordered  Decide to obtain previous medical records or to obtain history from someone other than the patient: yes  Review and summarize past medical records: yes  Discuss the patient with other providers: yes    Risk of Complications, Morbidity, and/or Mortality  Presenting problems: low  Diagnostic procedures: minimal  Management options: minimal        Disposition  Final diagnoses:   Chronic back pain   Panic disorder without agoraphobia   Major depressive disorder, recurrent episode, moderate degree ([facility] [address] )   Anxiety   Polypharmacy     Time reflects when diagnosis was documented in both MDM as applicable and the Disposition within this note     Time User Action Codes Description Comment    7/13/2021  3:20 PM Belinda Ch[place]itsik Add [M54 9,  G89 29] Chronic back pain     7/13/2021  4:13 PM Belinda [place]ik Add [F41 0] Panic disorder without agoraphobia     7/13/2021  4:13 PM Belinda Ch[place]ik Add [F33 1] Major depressive disorder, recurrent episode, moderate degree ([facility] [address] )     7/13/2021  5:01 PM Belinda Ch[place]ik Modify [M54 9,  G89 29] Chronic back pain     7/13/2021  5:01 PM Belinda [place] Modify [F41 0] Panic disorder without agoraphobia     7/13/2021  5:03 PM Belinda Ch[place]yitsik Add [F41 9] Anxiety     7/13/2021  5:03 PM Belinda Chalkyitsik Add [G96 048] Polypharmacy       ED Disposition     ED Disposition Condition Date/Time Comment    Admit Stable Tue Jul 13, 2021  5:01 PM Case was discussed with SOD and the patient's admission status was agreed to be Admission Status: observation status to the service of Dr Krystle Sabillon   Follow-up Information    None         Patient's Medications   Discharge Prescriptions    No medications on file     No discharge procedures on file      PDMP Review       Value Time User    PDMP Reviewed  Yes 7/13/2021  3:49 PM Lakeshia Mckeon, DO           ED Provider  Attending physically available and evaluated Samantha Rodriguez I managed the patient along with the ED Attending      Electronically Signed by         Adrianna Silva MD  07/13/21 2739

## 2021-07-13 NOTE — ASSESSMENT & PLAN NOTE
A:   COWS Score 4  History of opioid dependence for chronic pain  Per PDMP, last refill 06/25/2021  She was admitted to the hospital 06/27/2021 and was discharged on 07/07/2021  Due to stating they still have pain medication at home, she was not discharged on any new scripts after her last visit  The patient currently reports running out of her home medications, expected refill date 07/19/2021 per Dr Deena Pitts  P:  Monitor for signs of withdrawal  Treat for withdrawal accordingly as necessary  Not on home opioids  Senokot 8 6-50 mg p r n  for chronic constipation secondary to opioid use

## 2021-07-13 NOTE — TELEPHONE ENCOUNTER
Folder Color- ORANGE    Name of Form- [employer]  (RECERTIFICATION OF LEAVE for son Micheal Mole)    Form to be filled out by-  Dr Shira Greer to be Faxed to [phone]    Patient made aware of 10 business day policy  [address] [place] [address] has given Edmar Bunn a darci period to get this form completed and submitted  Must be fax by 7/26/21

## 2021-07-13 NOTE — H&P
INTERNAL MEDICINE HISTORY AND PHYSICAL  ED 07 {SOD [id number]}    NAME: Samantha Rodriguez  AGE: 60 y o  SEX: female  : 1965   MRN: [id number]  ENCOUNTER: [id number]    DATE: 2021  TIME: 5:42 PM    Primary Care Physician: Avelino Fisher MD  Admitting Provider: Klarissa Jones DO    Chief complaint: panic attack    History of Present Illness     Samantha Clark is a 60 y o  female with a past medical history significant for bipolar 2 disorder, panic attacks, depression and chronic back pain  She presented to the ED via EMS due to panic attacks  She reports she has been having panic attacks everyday- ongoing for a long time, but worsening for the past month  During the panic attacks, she endorses that she can't breathe, has chest pain, numbness in hands and feet, as well as hyperventilation and palpitations  With the attacks, she also reports feeling shaky, sweaty and is unable to talk  Prior to arrival and on admission, she endorses palpitations, nausea and vomiting  She reports trying to turn on air conditioning and taking deep breaths, but without improvement of symptoms  The panic attacks come out of nowhere, and nothing makes them better or worse  She reports that she saw her psychiatrist recently, sees him on a monthly basis, and she's managed on ativan   5mg every 6hrs as needed, as well as buspar, which was just increased from 10mg TID to 20mg TID by her psychiatrist  [facility] and confirmed the patient's home medications  She reports that she was only able to take two doses of 20mg of buspar yesterday, and only took one dose of 20mg today  In addition, she has chronic back pain s/p surgery in 2019 to place spinal cord stimulator  She doesn't remember the last time that she took morphine  In the ED, she was given acetaminophen 975mg, lorazepam 1mg x2, as well as morphine 6mg injection x2  After that, she reports feeling a little better   Currently, she is not short of breath, no longer having palpitations, no longer nauseous  She still endorses a sore chest as well as pain and stiffness in her back  But the tylenol helped a little  Patient was admited for further management of panic attacks and intractable back pain  After the patient received 12mg morphine and 2mg IV ativan, COWS score 5  Review of Systems   Review of Systems   Constitutional: Positive for chills and fatigue  Negative for appetite change (decreased)  Respiratory: Negative for shortness of breath  Cardiovascular: Positive for chest pain (soreness in the middle of her chest, from trying to breathe)  Negative for palpitations  Gastrointestinal: Positive for abdominal pain (sore, cramping, lower abdomen)  Negative for diarrhea, nausea and vomiting  Musculoskeletal:        Back stiffness  At home, ambulates with cane or walker   Skin: Negative for rash  Back is itchy   Allergic/Immunologic: Positive for environmental allergies (grass)  Neurological: Negative for headaches  Syncope:        ROS and exam was cut short as the patient was tired and wanted to sleep  Past Medical History     Past Medical History:   Diagnosis Date    Acid reflux     Anxiety     RESOLVED: [id number]    Arthritis     Bipolar 2 disorder (HCC)     FOLLOWS WITH PSYCHIATRIST  CONTINUE LAMOTRIGINE; RESOLVED: [id number]    Depression     Familial tremor     both hands    Fibromyalgia     LAST ASSESSED: [id number]    Hearing aid worn     left ear    [place] (hard of hearing)     left ear    Hypertension     Left-sided weakness     Lower back pain     Memory loss of unknown cause     long and short term    Migraine     Obesity     Obesity, Class II, BMI 35-39 9     Overactive bladder     Panic attack     Post traumatic stress disorder     Seasonal allergies     Stroke 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    CONT ASA 81 MG DAILY; RESOLVED: [id number]    Urinary incontinence     Wears dentures     partial lower / full upper    Wears glasses        Past Surgical History     Past Surgical History:   Procedure Laterality Date    BACK SURGERY       SECTION      COLONOSCOPY      RESOLVED: [id number]    EAR SURGERY      EGD      HYSTERECTOMY      MYRINGOTOMY W/ TUBES Left     NECK SURGERY  2019    AL CYSTOURETHROSCOPY N/A 2016    Procedure: CYSTOSCOPY, BOTOX INJECTION;  Surgeon: Roma Louis MD;  Location[facility];  Service: Gynecology    AL IMPLANT SPINAL NEUROSTIM/ Right 2/10/2021    Procedure: REPLACEMENT IMPLANTABLE PULSE GENERATOR DORSAL SPINAL COLUMN STIMULATOR, RIGHT;  Surgeon: Greer Hubbard MD;  Location:  MAIN OR;  Service: Neurosurgery    AL PERCUT IMPLNT [address] Right 2020    Procedure: INSERTION THORACIC DORSAL COLUMN SPINAL CORD STIMULATOR PERCUTANEOUS W IMPLANTABLE PULSE GENERATOR, RIGHT;  Surgeon: Greer Hubbard MD;  Location:  MAIN OR;  Service: Neurosurgery    [address] [place] [address]    UPPER GASTROINTESTINAL ENDOSCOPY  2020       Social History     Social History     Substance and Sexual Activity   Alcohol Use Not Currently    Comment: 2 x year; being a social drinker as per all scripts    - used to drink daily to cope, pt is unsure of how much, but quit approx 7 years ago  Social History     Substance and Sexual Activity   Drug Use No     Social History     Tobacco Use   Smoking Status Never Smoker   Smokeless Tobacco Never Used       Family History     Family History   Problem Relation Age of Onset    Colon cancer Mother     Alzheimer's disease Father     Stroke Father     Colon cancer Brother     Bipolar disorder Brother     Breast cancer Maternal Aunt     Colon cancer Maternal Aunt     Heart disease Other     Diabetes Other     Hypertension Other     Seizures Son     Depression Paternal Grandfather     No Known Problems Sister     No Known Problems Brother     Thyroid disease Neg Hx        Medications Prior to Admission     Prior to Admission medications    Medication Sig Start Date End Date Taking?  Authorizing Provider   acetaminophen (TYLENOL) 325 mg tablet Take 2 tablets (650 mg total) by mouth every 8 (eight) hours as needed for mild pain 3/9/21   Catrachito Paredes,    amLODIPine (NORVASC) 5 mg tablet Take 1 tablet (5 mg total) by mouth daily 6/5/21 9/3/21  Octavio Bernard MD   aspirin 81 mg chewable tablet Chew 1 tablet (81 mg total) daily 7/8/21   Carlie Melendez DO   atorvastatin (LIPITOR) 40 mg tablet Take 1 tablet (40 mg total) by mouth daily with dinner 7/7/21   Balwinder Carr DO   busPIRone (BUSPAR) 15 mg tablet Take 1 tablet (15 mg total) by mouth 3 (three) times a day 7/7/21   Balwinder Carr DO   [facility] Vitamin C 500 MG tablet TAKE 1 TABLET BY MOUTH TWICE A DAY 4/14/21   Octavio Bernard MD   Diapers & Supplies (Huggies Pull-Ups) MISC Use 3 (three) times a day 4/20/21   Sanket Keys,    Diclofenac Sodium (VOLTAREN) 1 % Apply 2 g topically 4 (four) times a day 6/28/21   Lakesha Lee MD   DULoxetine (CYMBALTA) 60 mg delayed release capsule TAKE 1 CAPSULE BY MOUTH EVERY DAY 4/14/21   Octavio Bernard MD   ferrous sulfate 324 (65 Fe) mg TAKE 1 TABLET (324 MG TOTAL) BY MOUTH 2 (TWO) TIMES A DAY BEFORE MEALS 4/13/21   Octavio Bernard MD   folic acid (FOLVITE) 1 mg tablet TAKE 1 TABLET BY MOUTH EVERY DAY 4/14/21   Octavio Bernard MD   glycerin-hypromellose- (ARTIFICIAL TEARS) 0 2-0 2-1 % SOLN Administer 1 drop to both eyes 2 (two) times a day  Patient not taking: Reported on 7/12/2021 7/7/21   Balwinder Carr DO   ipratropium-albuterol (DUO-NEB) 0 5-2 5 mg/3 mL nebulizer solution Take 1 vial (3 mL total) by nebulization every 6 (six) hours as needed for wheezing or shortness of breath  Patient not taking: Reported on 6/10/2021 3/29/21   Carlos Joseph,    lidocaine (LMX) 4 % cream Apply topically as needed for mild pain  Patient not taking: Reported on 7/12/2021 7/16/20   Maddy Fitzpatrick DO   LORazepam (ATIVAN) 0 5 mg tablet Take 1 tablet (0 5 mg total) by mouth every 6 (six) hours  Patient taking differently: Take 0 5 mg by mouth 3 (three) times a day  7/7/21   Vikash Torres DO   morphine (MS CONTIN) 15 mg 12 hr tablet Take 1 tablet (15 mg total) by mouth 2 (two) times a day Hold if sedatedMax Daily Amount: 30 mg 6/25/21   Alex Saba MD   Mouthwashes (Biotene Dry Mouth) LIQD Apply 5 mL to the mouth or throat daily     Historical Provider, MD   Multiple Vitamins-Minerals (multivitamin with minerals) tablet Take 1 tablet by mouth daily  Patient not taking: Reported on 6/27/2021 8/18/20   Neli Ko MD   naloxone [facility]) 4 mg/0 1 mL nasal spray Administer 1 spray into a nostril  If no response after 2-3 minutes, give another dose in the other nostril using a new spray  3/29/21   Shayy Vang DO   ondansetron (ZOFRAN-ODT) 4 mg disintegrating tablet Take 1 tablet (4 mg total) by mouth every 6 (six) hours as needed for nausea or vomiting  Patient not taking: Reported on 6/27/2021 9/17/20   Parish Aldana DO   oxyCODONE (ROXICODONE) 5 mg immediate release tablet Take 1 tablet (5 mg total) by mouth dailyMax Daily Amount: 5 mg 6/25/21   Alex Saba MD   pantoprazole (PROTONIX) 20 mg tablet Take 1 tablet (20 mg total) by mouth daily in the early morning 7/8/21   Carlie Melendez DO   Polyethylene Glycol 3350 (MIRALAX PO) Take by mouth  Patient not taking: Reported on 7/8/2021    Historical Provider, MD   potassium chloride (MICRO-K) 10 MEQ CR capsule Take 2 capsules (20 mEq total) by mouth daily Take 2 tablets (20 meq total) daily   7/8/21   Carlie Melendez DO   prazosin (MINIPRESS) 2 mg capsule TAKE 1 CAPSULE BY MOUTH EVERY DAY 7/9/20   Jenny Alvarez MD   pregabalin (LYRICA) 100 mg capsule Take 1 capsule (100 mg total) by mouth 2 (two) times a day 7/7/21 8/6/21  Meena Jackson MD   QUEtiapine (SEROquel) 100 mg tablet Take 1 tablet (100 mg total) by mouth daily at bedtime 7/7/21   Synetta Spatz, DO   Respiratory Therapy Supplies (Nebulizer) YUDY Use as needed (Shortness of breath)  Patient not taking: Reported on 4/20/2021 3/29/21   Ernst Barton DO   senna-docusate sodium (SENOKOT S) 8 6-50 mg per tablet Take 1 tablet by mouth daily at bedtime  Patient not taking: Reported on 7/12/2021 7/7/21   Synetta Spatz, DO   Valbenazine Tosylate (Ingrezza) 80 MG CAPS Take 80 mg by mouth daily 3/17/21   Jw Leonardo MD       Allergies   No Known Allergies    Objective     Vitals:    07/13/21 1335 07/13/21 1338 07/13/21 1521   BP:  165/86 140/81   BP Location:   Right arm   Pulse: (!) 140  103   Resp: 20  18   Temp: 98 3 °F (36 8 °C)     TempSrc: Oral     SpO2: 100%  100%   Weight: 89 8 kg (198 lb)       Body mass index is 37 41 kg/m²  No intake or output data in the 24 hours ending 07/13/21 1742  Invasive Devices     Peripheral Intravenous Line            Peripheral IV 07/13/21 Left Antecubital <1 day                Physical Exam  Physical Exam  HENT:      Head: Normocephalic and atraumatic  Eyes:      Pupils: Pupils are equal, round, and reactive to light  Cardiovascular:      Rate and Rhythm: Regular rhythm  Tachycardia present  Heart sounds: Normal heart sounds, S1 normal and S2 normal  No murmur heard  Pulmonary:      Breath sounds: Normal breath sounds  No wheezing, rhonchi or rales  Abdominal:      Palpations: Abdomen is soft  Tenderness: There is abdominal tenderness in the right lower quadrant and left lower quadrant  Musculoskeletal:      Right lower leg: No edema  Left lower leg: No edema  Comments: R pinky contracted   Neurological:      Mental Status: She is alert and oriented to person, place, and time  Motor: Tremor (b/l hands) present  Psychiatric:      Comments: + tardive dyskinesia, sticking tongue out        Lab Results: I have personally reviewed pertinent reports      {IMRH&PLABS:93772}    Imaging: I have personally reviewed pertinent films in PACS  Echo complete with contrast if indicated    Result Date: 2021  Narrative: Kristin [address] [facility], [address] [facility] [phone] Transthoracic Echocardiogram 2D, M-mode, Doppler, and Color Doppler Study date:  2021 Patient: Home Acosta MR number: [id number] Account number: [de-identified] : 1965 Age: 60 years Gender: Female Status: Inpatient Location: Bedside Height: 61 in Weight: 218 lb BP: 107/ 75 mmHg Indications: TIA  Diagnoses: G45 9 - Transient cerebral ischemic attack, unspecified Sonographer:  ELSA Basurto Primary Physician:  Mark Vila MD Referring Physician:  Lucía Andrew MD Group:  [facility] Interpreting Physician:  Joeann Sacks, MD SUMMARY PROCEDURE INFORMATION: This was a technically difficult study  Intravenous contrast ( 1 2 ml of Definity in NSS) was administered to opacify the left ventricle  LEFT VENTRICLE: Systolic function was normal  Ejection fraction was estimated to be 61 %  There were no regional wall motion abnormalities  Wall thickness was at the upper limits of normal  There was no evidence of concentric hypertrophy  HISTORY: PRIOR HISTORY: HTN;Obesity;Bipolar  PROCEDURE: The procedure was performed at the bedside  This was a routine study  The transthoracic approach was used  The study included complete 2D imaging, M-mode, complete spectral Doppler, and color Doppler  The heart rate was 58 bpm, at the start of the study  Images were obtained from the parasternal, apical, subcostal, and suprasternal notch acoustic windows  Intravenous contrast ( 1 2 ml of Definity in NSS) was administered to opacify the left ventricle  This was a technically difficult study  LEFT VENTRICLE: Size was normal  Systolic function was normal  Ejection fraction was estimated to be 61 %  There were no regional wall motion abnormalities   Wall thickness was at the upper limits of normal  There was no evidence of concentric hypertrophy  DOPPLER: Left ventricular diastolic function parameters were normal  RIGHT VENTRICLE: The size was normal  Systolic function was normal  Wall thickness was normal  LEFT ATRIUM: Size was normal  RIGHT ATRIUM: Size was normal  MITRAL VALVE: Valve structure was normal  There was normal leaflet separation  DOPPLER: The transmitral velocity was within the normal range  There was no evidence for stenosis  There was no regurgitation  AORTIC VALVE: The valve was trileaflet  Leaflets exhibited normal thickness and normal cuspal separation  DOPPLER: Transaortic velocity was within the normal range  There was no evidence for stenosis  There was no regurgitation  TRICUSPID VALVE: The valve structure was normal  There was normal leaflet separation  DOPPLER: The transtricuspid velocity was within the normal range  There was no evidence for stenosis  There was no regurgitation  PULMONIC VALVE: Leaflets exhibited normal thickness, no calcification, and normal cuspal separation  DOPPLER: The transpulmonic velocity was within the normal range  There was no regurgitation  PERICARDIUM: There was no pericardial effusion  The pericardium was normal in appearance  AORTA: The root exhibited normal size   SYSTEM MEASUREMENT TABLES 2D %FS: 29 43 % Ao Diam: 2 65 cm EDV(Teich): 78 26 ml EF(Teich): 56 77 % ESV(Teich): 33 83 ml IVSd: 0 78 cm LA Area: 11 73 cm2 LA Diam: 3 19 cm LVEDV MOD A4C: 61 02 ml LVEF MOD A4C: 61 73 % LVESV MOD A4C: 23 35 ml LVIDd: 4 19 cm LVIDs: 2 96 cm LVLd A4C: 7 03 cm LVLs A4C: 5 6 cm LVPWd: 1 02 cm RA Area: 9 99 cm2 RVIDd: 3 62 cm SV MOD A4C: 37 67 ml SV(Teich): 44 43 ml MM TAPSE: 2 15 cm PW E' Sept: 0 06 m/s E/E' Sept: 12 84 MV A William: 0 82 m/s MV Dec Mora: 3 85 m/s2 MV DecT: 217 4 ms MV E William: 0 83 m/s MV E/A Ratio: 1 03 MV PHT: 63 05 ms MVA By PHT: 3 49 cm2 [address] Accredited E[facility] Prepared and electronically signed by Raulito Alejo MD Signed 2021 16:47:03     EEG awake or drowsy routine    Result Date: 2021  Narrative: Routine EEG Patient Name:  Indira Clark  MRN: [id number] :  1965 File #: Meghana Pappas  Age: 60 y o  Ordering Provider: Bigg Gorman MD  Study Start-End: 21 [id number]             Study type: Routine EEG ICD 10 diagnosis: Transient neurological symptoms R29 818 ------------------------------------------------- Patient History: Samantha Rosales is a 60 y o  female with a PMH of history of polypharmacy who presents as a stroke alert given new onset aphasia, dysarthria and LLE weakness and repetitive speech  EEG ordered to evaluate for seizure tendency  Relevant medications include: No AEDs ------------------------------------------------- 32 channel digital recording with electrodes placed according to the International 10-20 system with continuous video, ECG, EOG and the possible addition of T1/T2 electrodes  This study was monitored by a monitoring technologist   The physician interpreting the study had access to the data throughout the recording  The recording was technically satisfactory but still limited by excessive eye blinking and myogenic artifact  ------------------------------------------------- Description: Background: The background organization was fair  During waking, there were poorly formed anterior-posterior voltage and frequency gradients and a poorly formed 6 Hz symmetric posterior dominant rhythm  The predominant awake background activity was generalized 3-6 Hz mixed polymorphic delta/theta activity  Sleep: There were no well-formed sleep structures were appreciated during this study Reactivity: The background was reactive to external stimuli  Photic stimulation did not lead to photic driving  Hyperventilation was deferred   Interictal abnormalities: None Rhythmic/Periodic patterns: None Seizures: None Events: No push buttons were activated however throughout the recording the patient was emotionally labile, repeating the multiple phrases and repetitively sticking out her tongue  There was no associated EEG correlate with these behaviors  Other findings: Samples of the single channel ECG demonstrated a regular rhythm  ------------------------------------------------- EEG impression: This is an abnormal routine EEG recording due to: 1  Generalized polymorphic delta/theta activity 2  Slow PDR Clinical Interpretation: This abnormal study is consistent with a moderate generalized non-specific encephalopathy  No electrographic seizures, interictal epileptiform discharges (seizure tendency) or focal slowing were seen  Abnormal behavior of phrase repetition, sticking out the tongue, emotional lability and inconsistently following commands was captured during recording and had no EEG correlate  Based on EEG and videography this behavior was non-epileptic in nature  Ferdinand Severs, MD [facility]     XR chest 1 view portable    Result Date: 7/12/2021  Narrative: CHEST INDICATION:   SOB  COMPARISON:  Chest 6/29/2021 EXAM PERFORMED/VIEWS:  XR CHEST PORTABLE FINDINGS: Cardiomediastinal silhouette appears unremarkable  The lungs are clear  No pneumothorax or pleural effusion  Osseous structures appear within normal limits for patient age  Partially imaged postsurgical changes of the cervical spine, thoracolumbar spine  A spinal stimulator device is present with lead tip overlying the mid thoracic spine  Impression: No acute cardiopulmonary disease  Workstation performed: [id number]     XR chest portable    Result Date: 6/30/2021  Narrative: CHEST INDICATION:   AMS  COMPARISON:  4/13/2021 EXAM PERFORMED/VIEWS:  XR CHEST PORTABLE FINDINGS: Cardiomediastinal silhouette appears unremarkable  The lungs are clear  No pneumothorax or pleural effusion  Osseous structures appear within normal limits for patient age    Spinal rods are partially visualized  Impression: No acute cardiopulmonary disease  Workstation performed: [id number]     XR foot 3+ vw right    Result Date: 6/23/2021  Narrative: RIGHT FOOT INDICATION:   S92 301A: Fracture of unspecified metatarsal bone(s), right foot, initial encounter for closed fracture  COMPARISON:  5/18/2021  VIEWS:  XR FOOT 3+ VW RIGHT FINDINGS: External wrapping obscures soft tissue and bony details  Fractures through the bases of the 2nd through 4th metatarsals are redemonstrated without significant interval change in alignment  Plantar calcaneal spurring is again noted  Impression: Fractures of the bases of the 2nd through 4th metatarsals without significant interval change in alignment  Workstation performed: [id number]     CT head wo contrast    Result Date: 6/29/2021  Narrative: CT BRAIN - WITHOUT CONTRAST INDICATION:   Encephalopathy with aphasia  COMPARISON:  CTA head and neck 6/27/2021 TECHNIQUE:  CT examination of the brain was performed  In addition to axial images, sagittal and coronal 2D reformatted images were created and submitted for interpretation  Radiation dose length product (DLP) for this visit:  913 12 mGy-cm   This examination, like all CT scans performed in the [facility], was performed utilizing techniques to minimize radiation dose exposure, including the use of iterative  reconstruction and automated exposure control  IMAGE QUALITY:  Diagnostic  FINDINGS: PARENCHYMA:  No intracranial masslike lesion, mass effect or midline shift  No CT signs of acute infarction  No acute parenchymal hemorrhage  VENTRICLES AND EXTRA-AXIAL SPACES:  Normal for the patient's age  VISUALIZED ORBITS AND PARANASAL SINUSES:  Unremarkable  CALVARIUM AND EXTRACRANIAL SOFT TISSUES:  Normal      Impression: Grossly stable exam   No acute CT abnormality   Workstation performed: [id number]     CT stroke alert brain    Result Date: 6/27/2021  Narrative: Rito S  [address]  PROTOCOL INDICATION:   stroke  COMPARISON:  February 18, 2014 TECHNIQUE:  CT examination of the brain was performed  In addition to axial images, coronal reformatted images were created and submitted for interpretation  Radiation dose length product (DLP) for this visit:  903 8 mGy-cm   This examination, like all CT scans performed in the [facility], was performed utilizing techniques to minimize radiation dose exposure, including the use of iterative reconstruction and automated exposure control  IMAGE QUALITY:  Diagnostic  FINDINGS:  PARENCHYMA:  No intracranial mass, mass effect or midline shift  No CT signs of acute infarction  No acute parenchymal hemorrhage  Normal intracranial vasculature  VENTRICLES AND EXTRA-AXIAL SPACES:  Normal for patient's age  VISUALIZED ORBITS AND PARANASAL SINUSES:  Unremarkable  CALVARIUM AND EXTRACRANIAL SOFT TISSUES:   Normal      Impression: No acute intracranial abnormality  Findings were directly discussed with Dr Zoe Tee on 6/27/2021 11:27 AM  Workstation performed: [id number]     US right upper quadrant    Result Date: 6/30/2021  Narrative: RIGHT UPPER QUADRANT ULTRASOUND INDICATION:  Elevated bilirubin  COMPARISON:  Right upper quadrant ultrasound 3/27/2012, CT abdomen and pelvis 9/17/2020 TECHNIQUE:   Real-time ultrasound of the right upper quadrant was performed with a curvilinear transducer with both volumetric sweeps and still imaging techniques  FINDINGS: PANCREAS:  Largely obscured by bowel gas  AORTA AND IVC:  Visualized portions are normal for patient age  LIVER: Size:  Within normal range  The liver measures 16 5 cm in the midclavicular line  Contour:  Surface contour is smooth  Parenchyma:  Echogenicity and echotexture are within normal limits  6 mm echogenic nodule in the left hepatic lobe, nonspecific but statistically likely to represent hemangioma  This was not clearly visualized on previous ultrasound   Limited imaging of the main portal vein shows it to be patent and hepatopetal   BILIARY: The gallbladder is normal in caliber  No wall thickening or pericholecystic fluid  2 4 cm shadowing calculus without sludge  Another 1 3 cm shadowing calculus noted in the neck  No obvious sonographic Grover sign  No intrahepatic biliary dilatation  CBD measures 5 mm  No choledocholithiasis  KIDNEY: Right kidney measures 10 4 x 5 5 cm  Within normal limits  ASCITES:   None  Impression: Pancreas obscured by bowel gas  Cholelithiasis without ultrasonographic evidence for cholecystitis  6 mm echogenic nodule left hepatic lobe, possibly hemangioma although not clearly seen previously  Follow-up ultrasound in 6 months recommended to assess size stability  If there is higher level of clinical concern based on patient's history, shorter interval nonemergent outpatient MRI abdomen with IV contrast (Gad[facility]ist) could be obtained for further characterization  The study was marked in [facility] for immediate notification and follow-up  Workstation performed: [id number]     CTA stroke alert (head/neck)    Result Date: 6/27/2021  Narrative: CTA NECK AND BRAIN WITH CONTRAST INDICATION: Left lower extremity weakness  Aphasia  COMPARISON:   None  TECHNIQUE:   Post contrast imaging was performed after administration of iodinated contrast through the neck and brain  Post contrast axial 0 625 mm images timed to opacify the arterial system  3D rendering was performed on an independent workstation  MIP reconstructions performed  Coronal reconstructions were performed of the noncontrast portion of the brain  Radiation dose length product (DLP) for this visit:  494 09 mGy-cm   This examination, like all CT scans performed in the [facility], was performed utilizing techniques to minimize radiation dose exposure, including the use of iterative  reconstruction and automated exposure control     IV Contrast:  85 mL of iohexol (OMNIPAQUE)  IMAGE QUALITY:   Diagnostic FINDINGS: CERVICAL VASCULATURE AORTIC ARCH AND GREAT VESSELS:  Normal aortic arch and great vessel origins  Normal visualized subclavian vessels  RIGHT VERTEBRAL ARTERY CERVICAL SEGMENT:  Normal origin  The vessel is normal in caliber throughout the neck  LEFT VERTEBRAL ARTERY CERVICAL SEGMENT:  Normal origin  The vessel is normal in caliber throughout the neck  RIGHT EXTRACRANIAL CAROTID SEGMENT:  Normal caliber common carotid artery  Normal bifurcation and cervical internal carotid artery  No stenosis or dissection  LEFT EXTRACRANIAL CAROTID SEGMENT:  Normal caliber common carotid artery  Normal bifurcation and cervical internal carotid artery  No stenosis or dissection  NASCET criteria was used to determine the degree of internal carotid artery diameter stenosis  INTRACRANIAL VASCULATURE INTERNAL CAROTID ARTERIES:  Normal enhancement of the intracranial portions of the internal carotid arteries  Normal ophthalmic artery origins  Normal ICA terminus  ANTERIOR CIRCULATION:  Symmetric A1 segments and anterior cerebral arteries with normal enhancement  Normal anterior communicating artery  MIDDLE CEREBRAL ARTERY CIRCULATION:  M1 segment and middle cerebral artery branches demonstrate normal enhancement bilaterally  DISTAL VERTEBRAL ARTERIES:  Normal distal vertebral arteries  Posterior inferior cerebellar artery origins are normal  Normal vertebral basilar junction  BASILAR ARTERY:  Basilar artery is normal in caliber  Normal superior cerebellar arteries  POSTERIOR CEREBRAL ARTERIES: Both posterior cerebral arteries arises from the basilar tip  Both arteries demonstrate normal enhancement  Normal posterior communicating arteries  DURAL VENOUS SINUSES:  Normal  NON VASCULAR ANATOMY BONY STRUCTURES:  Patient is status post C5-4-5 anterior fusion  Good morning I okay with going on SOFT TISSUES OF THE NECK:  Unremarkable  THORACIC INLET:  Unremarkable       Impression: No significant carotid or vertebral artery stenosis  No focal intracranial stenosis or aneurysm  Findings were directly discussed with Dr Jeff Reilly on 6/27/2021 11:27 AM  Workstation performed: [id number]       Microbiology: cultures obtained in emergency department include ***    Urinalysis:       Invalid input(s): URIBILINOGEN     Urine Micro:        EKG, Pathology, and Other Studies: I have personally reviewed pertinent reports  Medications given in Emergency Department     Medication Administration - last 24 hours from 07/12/2021 1742 to 07/13/2021 1742       Date/Time Order Dose Route Action Action by     07/13/2021 1347 LORazepam (ATIVAN) tablet 1 mg 1 mg Oral Given Ellen Fontanez RN     07/13/2021 1417 LORazepam (ATIVAN) injection 1 mg 1 mg Intravenous Given Aldair Pillai RN     07/13/2021 1416 morphine (PF) 10 mg/mL injection 6 mg 6 mg Intravenous Given Aldair Pillai RN     07/13/2021 1527 morphine (PF) 10 mg/mL injection 6 mg 6 mg Intravenous Given Austyn Jennings RN     07/13/2021 1526 acetaminophen (TYLENOL) tablet 975 mg 975 mg Oral Given Austyn Jennings RN     07/13/2021 1735 acetaminophen (TYLENOL) tablet 975 mg 975 mg Oral Not Given Austyn Jennings RN          Assessment and Plan     Medical Problems     Problem List     Chronic bilateral low back pain with bilateral sciatica    Right knee pain    Chronic pain syndrome    Lumbar radiculopathy    Lumbar spondylosis    Fibromyalgia    Lumbar disc disease with radiculopathy    Spinal stenosis of lumbar region with neurogenic claudication    Anxiety    Pain in joint, shoulder region    Intractable low back pain    Overview Signed 2/15/2018  1:43 PM by Madalyn Mercedes MD     Description: The patient still has limited range of motion with her low back  Continue Baclofen  Refuses physical therapy           Bilateral hip pain    Bipolar II disorder (HCC)    Cognitive disorder    Esophageal reflux    Overview Signed 2/15/2018  1:43 PM by Madalyn Mercedes MD Description: stable on ppi         Fatigue    Female pelvic pain    Generalized anxiety disorder    Hypertension    Overview Signed 2/15/2018  1:43 PM by Howard Lopez MD     Description: Continue Amlodipine  Hypokalemia    Overview Signed 2/15/2018  1:43 PM by Howard Lopez MD     Description: Continue Potassium chloride  Check a BMP  Insomnia    Major depressive disorder, recurrent episode, moderate degree ([facility] [address] )    Overview Signed 2/15/2018  1:43 PM by Howard Lopez MD     Transitioned From: Depression         Migraine    Overview Signed 2/15/2018  1:43 PM by Howard Lopez MD     Description: pt reports ha are better controlled since she is on migraine prophyalxis, propranolol 20mg bid  will continue  Opioid dependence ([facility] [address] )    Osteoarthritis of both hips    Osteoarthritis of knee    Overview Signed 2/15/2018  1:43 PM by Howard Lopez MD     Description: Continue Tylenol and Naproxen  Encourage exercise and weight loss  Patient refused physical therapy  I will refer the patient back to Orthopedics  Overactive bladder    Left hip pain    Overview Signed 2/15/2018  1:43 PM by Howard Lopez MD     Description: Right Hip X-Ray was normal  However patient was still having pain  Panic disorder without agoraphobia    Post traumatic stress disorder    Primary localized osteoarthritis of both knees    Recent unexplained weight loss    Sacroiliitis (HCC)    Tremor    Overview Signed 2/15/2018  1:43 PM by Howard Lopez MD     Description: Continue Primidone  Urinary incontinence    Overview Signed 2/15/2018  1:43 PM by Howard Lopez MD     Description: I will continue the patient on Oxybutynin  I will get prior authorization for this medication           Vitamin D deficiency    Post laminectomy syndrome    Encounter for long-term use of opiate analgesic    Family history of colorectal cancer Overview Addendum 2019 11:38 AM by Praneeth Bojorquez DO     Mother, brother (), maternal aunt, all < or = age 61, last colonoscopy , small rectal hyperplastic polyp; q 5 year f/u  Consideration given to genetics evaluation  F/u colonoscopy          Morbid obesity with BMI of 40 0-44 9, adult ([facility] [address] )    Complaint related to dreams    MIMI (obstructive sleep apnea)    Tardive dyskinesia    Primary osteoarthritis of both knees    Patellofemoral disorder of both knees    Rash    Superficial bacterial infection of skin    Scar of back    Impaired skin integrity associated with surgical incision    Status post insertion of spinal cord stimulator    Ambulatory dysfunction    Dysphagia    Arthritis of right knee    Metatarsal fracture    Chronic back pain    Nausea    Encephalopathy                Code Status: Level 1 - Full Code  VTE Pharmacologic Prophylaxis: {Pharmacologic VTE Prophylaxis:679737470}   VTE Mechanical Prophylaxis: {Mechanical VTE Prophylaxis:31181}  Admission Status: {Admission Status:06093}    Admission Time  I spent 1 hour admitting the patient  This involved direct patient contact where I performed a full history and physical, reviewing previous records, and reviewing laboratory and other diagnostic studies      [facility]  Internal Medicine  {PGY LEVEL:93539::"PGY-1"}

## 2021-07-13 NOTE — PROGRESS NOTES
INTERNAL MEDICINE RESIDENCY SENIOR ADMISSION NOTE     Name: Analia Adams   Age & Sex: 60 y o  female   MRN: [id number]  Unit/Bed#: ED 07   Encounter: [id number]  Primary Care Provider: Robert Bond MD    Admit to team: SOD Team A    Patient seen and examined  Reviewed H&P per Dr Merlene Mcneil    Agree with the assessment and plan with any exception/addition as noted below:    Principal Problem:    Panic disorder without agoraphobia  Active Problems:    Chronic bilateral low back pain with bilateral sciatica    Chronic pain syndrome    Bipolar II disorder (HCC)    Hypokalemia    Opioid dependence (HCC)    Post traumatic stress disorder    Tardive dyskinesia    Nausea    History of CVA (cerebrovascular accident)    68-year-old female with past medical history bipolar disorder, PTSD, CVA, chronic pain syndrome secondary to bilateral low back pain status post spinal cord stimulator, tardive dyskinesia, opioid dependence with recent admission for encephalopathy thought to be secondary to polypharmacy who presents to the ED today with worsening anxiety and generalized pain  Of note, pt recently had admission for encephalopathy thought to be 2/2 to polypharmacy after extensive neurology workup noted to be negative  Patient seen in our outpatient clinic on 07/12  PDMP reviewed  Patient received a script for home dose morphine 15 mg b i d  And oxycodone 5 mg q d  P r n  on 06/25/2021  Patient was admitted from 06/27/2021 to 07/07/2021  Upon discharge confirmed with son at that time that patient had remaining oxycodone and morphine pills as expected, as patient should have 8 more days left  Unfortunately patient did not bring her oxycodone and morphine pill bottles to clinic visit on 07/12  States she ran out of medicine, but is unable to confirm where medication went    In setting of recent admission of encephalopathy likely secondary to polypharmacy, decision was made to not refill medication until next due date which would be 07/19/2021  Upon arrival to the ED patient was noted to be extremely anxious, hyperventilating, yelling and screaming  Patient received 12 mg IV morphine and total and 2 mg IV Ativan in total   Upon my evaluation, patient calm and appropriately responding  Alert and oriented x3  Patient continues to repeat that she is very anxious, unfortunately is a poor historian and tangential thought process  Current cows score of 5  UDS noted to be negative for both opioid and benzodiazepines, which patient is prescribed outpatient  Confirmed on PDMP  Patient was admitted for panic attack and intractable pain  Current cows score 5  In setting of recent admission for polypharmacy and unclear opioid medication  usage will avoid any further opioid at this time  · Monitor for withdrawal symptoms treat symptomatically  · Suspect symptoms largely secondary to severe panic attack and uncontrolled anxiety  Would recommend treating underlying anxiety further p r n   Medications   · Continue with home regimen of Ativan 0 5 mg q 6 hour p r n   · Monitor vitals closely    Further management as highlighted in H&P      Code Status: Level 1 - Full Code  Admission Status: OBSERVATION  Disposition: Patient requires Med/Surg  Expected Length of Stay: < 2days       Amilcar Woodard,   Internal Medicine- PGY3

## 2021-07-14 ENCOUNTER — PATIENT OUTREACH (OUTPATIENT)
Dept: INTERNAL MEDICINE CLINIC | Facility: CLINIC | Age: 56
End: 2021-07-14

## 2021-07-14 LAB
ANION GAP SERPL CALCULATED.3IONS-SCNC: 6 MMOL/L (ref 4–13)
ATRIAL RATE: 100 BPM
BUN SERPL-MCNC: 8 MG/DL (ref 5–25)
CALCIUM SERPL-MCNC: 9 MG/DL (ref 8.3–10.1)
CHLORIDE SERPL-SCNC: 111 MMOL/L (ref 100–108)
CO2 SERPL-SCNC: 20 MMOL/L (ref 21–32)
CREAT SERPL-MCNC: 0.8 MG/DL (ref 0.6–1.3)
ERYTHROCYTE [DISTWIDTH] IN BLOOD BY AUTOMATED COUNT: 14.1 % (ref 11.6–15.1)
GFR SERPL CREATININE-BSD FRML MDRD: 95 ML/MIN/1.73SQ M
GLUCOSE SERPL-MCNC: 109 MG/DL (ref 65–140)
HCT VFR BLD AUTO: 42.4 % (ref 34.8–46.1)
HGB BLD-MCNC: 14.1 G/DL (ref 11.5–15.4)
MCH RBC QN AUTO: 29.6 PG (ref 26.8–34.3)
MCHC RBC AUTO-ENTMCNC: 33.3 G/DL (ref 31.4–37.4)
MCV RBC AUTO: 89 FL (ref 82–98)
P AXIS: 80 DEGREES
PLATELET # BLD AUTO: 371 THOUSANDS/UL (ref 149–390)
PMV BLD AUTO: 9.2 FL (ref 8.9–12.7)
POTASSIUM SERPL-SCNC: 4.1 MMOL/L (ref 3.5–5.3)
PR INTERVAL: 132 MS
QRS AXIS: 108 DEGREES
QRSD INTERVAL: 72 MS
QT INTERVAL: 336 MS
QTC INTERVAL: 433 MS
RBC # BLD AUTO: 4.77 MILLION/UL (ref 3.81–5.12)
SODIUM SERPL-SCNC: 137 MMOL/L (ref 136–145)
T WAVE AXIS: -6 DEGREES
VENTRICULAR RATE: 100 BPM
WBC # BLD AUTO: 3.88 THOUSAND/UL (ref 4.31–10.16)

## 2021-07-14 PROCEDURE — 97163 PT EVAL HIGH COMPLEX 45 MIN: CPT

## 2021-07-14 PROCEDURE — 93010 ELECTROCARDIOGRAM REPORT: CPT | Performed by: INTERNAL MEDICINE

## 2021-07-14 PROCEDURE — 80048 BASIC METABOLIC PNL TOTAL CA: CPT | Performed by: STUDENT IN AN ORGANIZED HEALTH CARE EDUCATION/TRAINING PROGRAM

## 2021-07-14 PROCEDURE — 85027 COMPLETE CBC AUTOMATED: CPT | Performed by: STUDENT IN AN ORGANIZED HEALTH CARE EDUCATION/TRAINING PROGRAM

## 2021-07-14 PROCEDURE — NC001 PR NO CHARGE: Performed by: INTERNAL MEDICINE

## 2021-07-14 RX ORDER — LORAZEPAM 2 MG/ML
0.5 INJECTION INTRAMUSCULAR ONCE AS NEEDED
Status: COMPLETED | OUTPATIENT
Start: 2021-07-14 | End: 2021-07-14

## 2021-07-14 RX ADMIN — DICLOFENAC SODIUM 2 G: 10 GEL TOPICAL at 12:58

## 2021-07-14 RX ADMIN — BUSPIRONE HYDROCHLORIDE 20 MG: 10 TABLET ORAL at 21:05

## 2021-07-14 RX ADMIN — AMLODIPINE BESYLATE 5 MG: 5 TABLET ORAL at 09:17

## 2021-07-14 RX ADMIN — ACETAMINOPHEN 975 MG: 325 TABLET, FILM COATED ORAL at 13:05

## 2021-07-14 RX ADMIN — ACETAMINOPHEN 975 MG: 325 TABLET, FILM COATED ORAL at 21:05

## 2021-07-14 RX ADMIN — DULOXETINE HYDROCHLORIDE 60 MG: 60 CAPSULE, DELAYED RELEASE ORAL at 09:22

## 2021-07-14 RX ADMIN — QUETIAPINE FUMARATE 100 MG: 25 TABLET ORAL at 21:05

## 2021-07-14 RX ADMIN — ACETAMINOPHEN 975 MG: 325 TABLET, FILM COATED ORAL at 05:53

## 2021-07-14 RX ADMIN — ATORVASTATIN CALCIUM 40 MG: 40 TABLET, FILM COATED ORAL at 18:13

## 2021-07-14 RX ADMIN — ENOXAPARIN SODIUM 40 MG: 40 INJECTION SUBCUTANEOUS at 09:17

## 2021-07-14 RX ADMIN — DICLOFENAC SODIUM 2 G: 10 GEL TOPICAL at 09:21

## 2021-07-14 RX ADMIN — PRAZOSIN HYDROCHLORIDE 2 MG: 2 CAPSULE ORAL at 09:21

## 2021-07-14 RX ADMIN — BUSPIRONE HYDROCHLORIDE 20 MG: 10 TABLET ORAL at 09:20

## 2021-07-14 RX ADMIN — LORAZEPAM 0.5 MG: 0.5 TABLET ORAL at 09:16

## 2021-07-14 RX ADMIN — DICLOFENAC SODIUM 2 G: 10 GEL TOPICAL at 21:08

## 2021-07-14 RX ADMIN — ASPIRIN 81 MG: 81 TABLET, CHEWABLE ORAL at 09:20

## 2021-07-14 RX ADMIN — FOLIC ACID 1000 MCG: 1 TABLET ORAL at 09:20

## 2021-07-14 RX ADMIN — PREGABALIN 100 MG: 50 CAPSULE ORAL at 09:20

## 2021-07-14 RX ADMIN — BUSPIRONE HYDROCHLORIDE 20 MG: 10 TABLET ORAL at 18:13

## 2021-07-14 RX ADMIN — PANTOPRAZOLE SODIUM 20 MG: 20 TABLET, DELAYED RELEASE ORAL at 05:53

## 2021-07-14 RX ADMIN — LORAZEPAM 0.5 MG: 2 INJECTION INTRAMUSCULAR; INTRAVENOUS at 14:28

## 2021-07-14 RX ADMIN — LORAZEPAM 0.5 MG: 0.5 TABLET ORAL at 18:16

## 2021-07-14 RX ADMIN — PREGABALIN 100 MG: 50 CAPSULE ORAL at 18:13

## 2021-07-14 RX ADMIN — DICLOFENAC SODIUM 2 G: 10 GEL TOPICAL at 18:14

## 2021-07-14 RX ADMIN — POTASSIUM CHLORIDE 20 MEQ: 20 SOLUTION ORAL at 09:17

## 2021-07-14 RX ADMIN — VALBENAZINE 80 MG: 80 CAPSULE ORAL at 14:37

## 2021-07-14 NOTE — PHYSICIAN ADVISOR
Current patient class: Observation  The patient is currently on Hospital Day: 2 at [facility]        The patient was admitted to the hospital  on N/A at N/A for the following diagnosis:  Panic disorder without agoraphobia [F41 0]  Anxiety [F41 9]  Major depressive disorder, recurrent episode, moderate degree (HCC) [F33 1]  Polypharmacy [Z79 899]  Chronic back pain [M54 9, G89 29]     After review of the relevant documentation, labs, vital signs and test results, the patient is most appropriate for OBSERVATION STATUS  The patient was admitted to the hospital without an expected length of stay of at least 2 midnights  Given that the patient is subject to the 2 midnight benchmark as a CMS patient, they are appropriate for observation status at this time  Should the patient remain hospitalized for a second midnight the status should be reevaluated for medical necessity  Rationale is as follows: The patient is a 60 yrs   Female who presented to the ED at 7/13/2021  1:33 PM with a chief complaint of Anxiety (pt states " im having another panic attack    my dr said if it happens again i need to go to a facility, [facility] to go [facility]")Upon presentation patient was tachycardic  She has had similar presentations like this in the past   She did get Ativan to help with anxiety and her panic issues and at the current time is being worked up for placement  Upon review there does not seem to be any significant new interventions being delivered  She remains on many psychiatric medications and also has a history of chronic pain  She has been evaluated physical therapy and had 7/10 pain according to their assessment  Currently receiving Tylenol  She has been on narcotics at home but ran out of her medication prematurely  Not clear if patient is even taking medications as urine drug screen was negative    In light of recent hospital stay and also now looking at placement, would keep patient in the observation status category  If other interventions are necessary throughout her hospital stay this can be reassessed for changing status to inpatient  The patients vitals on arrival were   ED Triage Vitals   Temperature Pulse Respirations Blood Pressure SpO2   21 1335 21 1335 21 1335 21 1338 21 1335   98 3 °F (36 8 °C) (!) 140 20 165/86 100 %      Temp Source Heart Rate Source Patient Position - Orthostatic VS BP Location FiO2 (%)   21 1335 21 1335 21 1521 21 1521 --   Oral Monitor Lying Right arm       Pain Score       21 1416       Worst Possible Pain           Past Medical History:   Diagnosis Date    Acid reflux     Anxiety     RESOLVED: [id number]    Arthritis     Bipolar 2 disorder (HCC)     FOLLOWS WITH PSYCHIATRIST  CONTINUE LAMOTRIGINE; RESOLVED: [id number]    Depression     Familial tremor     both hands    Fibromyalgia     LAST ASSESSED: [id number]    Hearing aid worn     left ear    Jamul (hard of hearing)     left ear    Hypertension     Left-sided weakness     Lower back pain     Memory loss of unknown cause     long and short term    Migraine     Obesity     Obesity, Class II, BMI 35-39 9     Overactive bladder     Panic attack     Post traumatic stress disorder     Seasonal allergies     Stroke [facility])     questionable stroke 2009    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    CONT ASA 81 MG DAILY; RESOLVED: [id number]    Urinary incontinence     Wears dentures     partial lower / full upper    Wears glasses      Past Surgical History:   Procedure Laterality Date    BACK SURGERY       SECTION      COLONOSCOPY      RESOLVED: [id number]    EAR SURGERY      EGD      HYSTERECTOMY  2004    MYRINGOTOMY W/ TUBES Left     NECK SURGERY  2019    SD CYSTOURETHROSCOPY N/A 2016    Procedure: CYSTOSCOPY, BOTOX INJECTION;  Surgeon: Cara Fry MD; Location: [facility];  Service: Gynecology    WA IMPLANT SPINAL NEUROSTIM/ Right 2/10/2021    Procedure: REPLACEMENT IMPLANTABLE PULSE GENERATOR DORSAL SPINAL COLUMN STIMULATOR, RIGHT;  Surgeon: Aleta Lopez MD;  Location: [facility];  Service: Neurosurgery    WA PERCUT IMPLNT NEUROELECT,EPIDURAL Right 7/28/2020    Procedure: INSERTION THORACIC DORSAL COLUMN SPINAL CORD STIMULATOR PERCUTANEOUS W IMPLANTABLE PULSE GENERATOR, RIGHT;  Surgeon: Aleta Lopez MD;  Location: [facility];  Service: Neurosurgery    TONSILLECTOMY     [address]    UPPER GASTROINTESTINAL ENDOSCOPY  09/2020           Consults have been placed to:   IP CONSULT TO CASE MANAGEMENT  IP CONSULT TO PSYCHIATRY    Vitals:    07/13/21 2300 07/14/21 0700 07/14/21 0917 07/14/21 1500   BP: 116/69 116/63 128/90 109/55   BP Location: Left arm Left arm  Left arm   Pulse: 74 77  72   Resp: 15 16  16   Temp: (!) 97 4 °F (36 3 °C) 98 1 °F (36 7 °C)  98 5 °F (36 9 °C)   TempSrc: Oral Oral  Oral   SpO2: 99% 98%  92%   Weight:       Height:           Most recent labs:    Recent Labs     07/14/21  1102   WBC 3 88*   HGB 14 1   HCT 42 4      K 4 1   CALCIUM 9 0   BUN 8   CREATININE 0 80       Scheduled Meds:  Current Facility-Administered Medications   Medication Dose Route Frequency Provider Last Rate    acetaminophen  975 mg Oral Q8H [address] Carlieja Saraiya, DO      amLODIPine  5 mg Oral Daily Carlieja Saraiya, DO      aspirin  81 mg Oral Daily Carlieoja Saraiya, DO      atorvastatin  40 mg Oral Daily With [employer], DO      busPIRone  20 mg Oral TID Carlieja Saraiya, DO      Diclofenac Sodium  2 g Topical 4x Daily Carlieja Saraiya, DO      DULoxetine  60 mg Oral Daily Carlieja Saraiya, DO      enoxaparin  40 mg Subcutaneous Daily Carlieja Saraiya, DO      folic acid  7,459 mcg Oral Daily Carlieja Saraiya, DO      LORazepam  0 5 mg Oral Q6H PRN Carlieja Saraiya, DO      ondansetron  4 mg Oral Q6H PRN Carlieja Saraiya, DO      pantoprazole  20 mg Oral Early Morning Carlie Saraiya, DO      potassium chloride  20 mEq Oral Daily Carlieja Saraiya, DO      prazosin  2 mg Oral Daily Carlieja Saraiya, DO      pregabalin  100 mg Oral BID Carlieja Saraiya, DO      QUEtiapine  100 mg Oral HS Carlieoja Saraiya, DO      senna-docusate sodium  1 tablet Oral Daily PRN Albania Hsu, DO      Valbenazine Tosylate  80 mg Oral Daily Carlieja Saraiya, DO       Continuous Infusions:   PRN Meds:  LORazepam    ondansetron    senna-docusate sodium    Surgical procedures (if appropriate):

## 2021-07-14 NOTE — CASE MANAGEMENT
OBS   NO bundle   Will be a readmit if admitted  Pt returned to the ED after suffering a debilitating panic attack  Pt lives with her son in an apartment  [address]  Uses walker has w/c ( Per [employer] w/c was delivered in June)  Uses [facility] on [address]    Has out patient CM working on assisting her with obtaining waiver  Hx Panic attacks, depression, bipolar 2 follows out patient mental health at [address] [facility]  Pt wants to go to SNF but is refusing [facility]  She states he son can't take care of her  She does admit that she will want to go home not stay permanently  Family transports  OBS notice given aNd signed Copy placed in MR bin to scan into Epic  CM reviewed d/c planning process including the following: identifying help at home, patient preference for d/c planning needs, Discharge Lounge, [facility] Meds to Bed program, availability of treatment team to discuss questions or concerns patient and/or family may have regarding understanding medications and recognizing signs and symptoms once discharged  CM also encouraged patient to follow up with all recommended appointments after discharge  Patient advised of importance for patient and family to participate in managing patients medical well being

## 2021-07-14 NOTE — PROGRESS NOTES
INTERNAL MEDICINE RESIDENCY PROGRESS NOTE     Name: Abigail Hsu   Age & Sex: 60 y o  female   MRN: [id number]  Unit/Bed#: [facility] 307-01   Encounter: [id number]  Team: SOD Team A    PATIENT INFORMATION     Name: Abigail Hsu   Age & Sex: 60 y o  female   MRN: [id number]  Hospital Stay Days: 0    ASSESSMENT/PLAN     Principal Problem:    Panic disorder without agoraphobia  Active Problems:    Chronic bilateral low back pain with bilateral sciatica    Chronic pain syndrome    Bipolar II disorder ([facility])    Hypokalemia    Opioid dependence ([address] )    Post traumatic stress disorder    Tardive dyskinesia    Nausea    History of CVA (cerebrovascular accident)      History of CVA (cerebrovascular accident)  Assessment & Plan  A:  Reported CVA in 2010    P:  Aspirin 81 mg daily  Lovastatin 40 mg daily      Nausea  Assessment & Plan  Nausea likely secondary to panic disorder or opiate withdrawal    Zofran 4 mg q 6h p r n  Tardive dyskinesia  Assessment & Plan  Valbenazine 80 mg as per home regimen    Post traumatic stress disorder  Assessment & Plan  Prazosin 2 mg PTSD    Opioid dependence ([facility])  Assessment & Plan  A:   COWS Score 4  History of opioid dependence for chronic pain  Per PDMP, last refill 06/25/2021  She was admitted to the hospital 06/27/2021 and was discharged on 07/07/2021  Due to stating they still have pain medication at home, she was not discharged on any new scripts after her last visit  The patient currently reports running out of her home medications, expected refill date 07/19/2021 per Dr Reji Russell  P:  Monitor for signs of withdrawal  Treat for withdrawal accordingly as necessary  Not on home opioids  Senokot 8 6-50 mg p r n  for chronic constipation secondary to opioid use        Hypokalemia  Assessment & Plan  A: Potassium 3 4 on admission, pending BMP from today    P:  Given K-Dur 40 mEq  Potassium chloride 20 mEq capsule daily starting tomorrow  Replete as needed    Bipolar II disorder [facility])  Assessment & Plan  History of bipolar 2 disorder controlled on home medications    Quetiapine 100 mg daily at bedtime    Chronic pain syndrome  Assessment & Plan  A:  Chronic pain    P:  Voltaren gel 1% topical 2g, 4 times a day  Duloxetine 60 mg  Pregabalin 100 mg b i d  Tylenol 975 mg q 8    Chronic bilateral low back pain with bilateral sciatica  Assessment & Plan  A:  History of lumbar stenosis  Status post spinal cord stimulator    P:  See chronic pain syndrome    * Panic disorder without agoraphobia  Assessment & Plan  A:   Presented with symptoms of panic attack:  Chest pain, nausea, sweating, numbness in the hands and feet, shortness of breath  Known history  Had episode July 14    P:  Ativan 0 5 mg q 6h p r n  Buspar 20 mg t i d   Monitor for symptoms      Disposition:  Pending PT/OT eval for outpatient rehab  Patient is agreeable to facility during this admission  To discuss with case management  SUBJECTIVE     Patient seen and examined  No acute events overnight  Patient noted continued back pain that is not relieved by Tylenol  She mentions being agreeable to going to facility after discharge and specifically mentioned she does not want to go to [facility]  Early in the visit, the patient became anxious and noted having onset of panic attack  She became notably in distress and complained of chest pain, trouble breathing, nausea, sweating, numbness in her legs  She received 0 5 mg of Ativan p o  Review of Systems   Constitutional: Positive for diaphoresis  Respiratory: Positive for chest tightness and shortness of breath  Cardiovascular: Positive for chest pain and palpitations  Gastrointestinal: Positive for abdominal pain and nausea  Negative for vomiting  Musculoskeletal: Positive for back pain  Neurological: Positive for numbness and headaches  Negative for dizziness  Psychiatric/Behavioral: The patient is nervous/anxious            OBJECTIVE     Vitals: 21 2100 21 2300 21 0700 21 0917   BP: 113/60 116/69 116/63 128/90   BP Location: Left arm Left arm Left arm    Pulse: 68 74 77    Resp: 16 15 16    Temp: 98 6 °F (37 °C) (!) 97 4 °F (36 3 °C) 98 1 °F (36 7 °C)    TempSrc: Oral Oral Oral    SpO2: 99% 99% 98%    Weight: 91 6 kg (202 lb)      Height: 5' 1" (1 549 m)         Temperature:   Temp (24hrs), Av 1 °F (36 7 °C), Min:97 4 °F (36 3 °C), Max:98 6 °F (37 °C)    Temperature: 98 1 °F (36 7 °C)  Intake & Output:  I/O        07 -  07 07 -  07 07 - 07/15 0700           Unmeasured Urine Occurrence  1 x         Weights:   IBW (Ideal Body Weight): 47 8 kg    Body mass index is 38 17 kg/m²  Weight (last 2 days)     Date/Time   Weight    21 2100   91 6 (202)    21 1335   89 8 (198)            Physical Exam  Vitals reviewed  Constitutional:       General: She is in acute distress  HENT:      Head: Normocephalic and atraumatic  Cardiovascular:      Rate and Rhythm: Regular rhythm  Tachycardia present  Unable to obtain rest of Physical Exam due to panic attack at the time of visit  LABORATORY DATA     Labs: I have personally reviewed pertinent reports    Results from last 7 days   Lab Units 21  1102 21  1753 07/11/21  1710   WBC Thousand/uL 3 88*  --  6 54   HEMOGLOBIN g/dL 14 1  --  14 0   HEMATOCRIT % 42 4  --  41 4   PLATELETS Thousands/uL 371 348 402*   NEUTROS PCT %  --   --  79*   MONOS PCT %  --   --  5      Results from last 7 days   Lab Units 21  1752 21  1123 21  1710   POTASSIUM mmol/L 3 4* 3 3* 3 2*   CHLORIDE mmol/L 110* 108 110*   CO2 mmol/L 22 21 18*   BUN mg/dL 7 5 7   CREATININE mg/dL 0 63 0 74 0 85   CALCIUM mg/dL 8 9 9 0 9 1   ALK PHOS U/L  --   --  104   ALT U/L  --   --  18   AST U/L  --   --  12     Results from last 7 days   Lab Units 21  1752 21  1123   MAGNESIUM mg/dL 1 8 1 9                        IMAGING & DIAGNOSTIC TESTING     Radiology Results: I have personally reviewed pertinent reports  No results found  Other Diagnostic Testing: I have personally reviewed pertinent reports  ACTIVE MEDICATIONS     Current Facility-Administered Medications   Medication Dose Route Frequency    acetaminophen (TYLENOL) tablet 975 mg  975 mg Oral Q8H [facility]    amLODIPine (NORVASC) tablet 5 mg  5 mg Oral Daily    aspirin chewable tablet 81 mg  81 mg Oral Daily    atorvastatin (LIPITOR) tablet 40 mg  40 mg Oral Daily With Dinner    busPIRone (BUSPAR) tablet 20 mg  20 mg Oral TID    Diclofenac Sodium (VOLTAREN) 1 % topical gel 2 g  2 g Topical 4x Daily    DULoxetine (CYMBALTA) delayed release capsule 60 mg  60 mg Oral Daily    enoxaparin (LOVENOX) subcutaneous injection 40 mg  40 mg Subcutaneous Daily    folic acid (FOLVITE) tablet 1,000 mcg  1,000 mcg Oral Daily    LORazepam (ATIVAN) tablet 0 5 mg  0 5 mg Oral Q6H PRN    ondansetron (ZOFRAN-ODT) dispersible tablet 4 mg  4 mg Oral Q6H PRN    pantoprazole (PROTONIX) EC tablet 20 mg  20 mg Oral Early Morning    potassium chloride oral solution 20 mEq  20 mEq Oral Daily    prazosin (MINIPRESS) capsule 2 mg  2 mg Oral Daily    pregabalin (LYRICA) capsule 100 mg  100 mg Oral BID    QUEtiapine (SEROquel) tablet 100 mg  100 mg Oral HS    senna-docusate sodium (SENOKOT S) 8 6-50 mg per tablet 1 tablet  1 tablet Oral Daily PRN    Valbenazine Tosylate CAPS 80 mg  80 mg Oral Daily       VTE Pharmacologic Prophylaxis: Enoxaparin (Lovenox)  VTE Mechanical Prophylaxis: sequential compression device    Portions of the record may have been created with voice recognition software  Occasional wrong word or "sound a like" substitutions may have occurred due to the inherent limitations of voice recognition software    Read the chart carefully and recognize, using context, where substitutions have occurred   ==  Jessica Hall, [facility]  Internal Medicine Residency PGY-1

## 2021-07-14 NOTE — PLAN OF CARE
Problem: PHYSICAL THERAPY ADULT  Goal: Performs mobility at highest level of function for planned discharge setting  See evaluation for individualized goals  Description: Treatment/Interventions: Functional transfer training, LE strengthening/ROM, Therapeutic exercise, Endurance training, Elevations, Bed mobility, Gait training, Equipment eval/education          See flowsheet documentation for full assessment, interventions and recommendations  Note: Prognosis: Good  Problem List: Decreased strength, Decreased endurance, Decreased mobility, Pain  Assessment: Pt is a 61 yo female admitted to [facility] on 7/13/2021 s/p anxiety  Dx: hx of CVA, nausea, tardive dyskinesia, PTSD, opoid dependence, hypokalemia chronic pain syndrome, chronic B low back pain with B sciatica, panic disorder without agoraphobia  Two patient identifiers were used to confirm  Pt lives in a one story apartment with 2 SKY with her son  Pt's son is home at all times currently but will be returning to work shortly  Pt uses rollator at baseline  Pt requires A with IADL's  Pt was I for mobility and ADL's  Pt came from home and did not go to rehab after previous admission  Per CM patient's family has  Concerns about her D/C home  will need to follow up with CM about placement  Pt's impairments include reduced mobility, gait abnormalitites, risk of falling, pain  These impairments limit the ability of the patient to perform mobility without increased assistance, return to PLOF and participate in everyday life activities  Pt would benefit from continued skilled therapy while in the hospital to improve overall mobility and work towards a safe d/c  Recommend discharge to home with home PT and continued support from family  At the end of the session the patient was left in seated position with call bell and phone within reach  The patient's AM-PAC Basic Mobility Inpatient Short Form Raw Score is 20, Standardized Score is 43 99   A standardized score less than 42 9 suggests the patient may benefit from discharge to home  Please also refer to the recommendation of the Physical Therapist for safe discharge planning  PT Discharge Recommendation: Home with home health rehabilitation     PT - OK to Discharge: No    See flowsheet documentation for full assessment

## 2021-07-14 NOTE — PLAN OF CARE
Problem: MOBILITY - ADULT  Goal: Maintain or return to baseline ADL function  Description: INTERVENTIONS:  -  Assess patient's ability to carry out ADLs; assess patient's baseline for ADL function and identify physical deficits which impact ability to perform ADLs (bathing, care of mouth/teeth, toileting, grooming, dressing, etc )  - Assess/evaluate cause of self-care deficits   - Assess range of motion  - Assess patient's mobility; develop plan if impaired  - Assess patient's need for assistive devices and provide as appropriate  - Encourage maximum independence but intervene and supervise when necessary  - Involve family in performance of ADLs  - Assess for home care needs following discharge   - Consider OT consult to assist with ADL evaluation and planning for discharge  - Provide patient education as appropriate  Outcome: Progressing  Goal: Maintains/Returns to pre admission functional level  Description: INTERVENTIONS:  - Perform BMAT or MOVE assessment daily    - Set and communicate daily mobility goal to care team and patient/family/caregiver     - Collaborate with rehabilitation services on mobility goals if consulted  - Out of bed for toileting  - Record patient progress and toleration of activity level   Outcome: Progressing     Problem: COPING  Goal: Will report anxiety at manageable levels  Description: INTERVENTIONS:  - Administer medication as ordered  - Teach and encourage coping skills  - Provide emotional support  - Assess patient/family for anxiety and ability to cope  Outcome: Progressing     Problem: Depression - IP adult  Goal: Effects of depression will be minimized  Description: INTERVENTIONS:  - Assess impact of patient's symptoms on level of functioning, self-care needs and offer support as indicated  - Assess patient/family knowledge of depression, impact on illness and need for teaching  - Provide emotional support, presence and reassurance  - Assess for possible suicidal thoughts, ideation or self-harm   If patient expresses suicidal thoughts or statements do not leave alone, notify physician/AP immediately, initiate Suicide Precautions, and determine need for continual observation   - Initiate consults and referrals as appropriate (a mental health professional, Spiritual Care)  - Administer medication as ordered  Outcome: Progressing     Problem: PAIN - ADULT  Goal: Verbalizes/displays adequate comfort level or baseline comfort level  Description: Interventions:  - Encourage patient to monitor pain and request assistance  - Assess pain using appropriate pain scale  - Administer analgesics based on type and severity of pain and evaluate response  - Implement non-pharmacological measures as appropriate and evaluate response  - Consider cultural and social influences on pain and pain management  - Notify physician/advanced practitioner if interventions unsuccessful or patient reports new pain  Outcome: Progressing     Problem: SAFETY ADULT  Goal: Maintain or return to baseline ADL function  Description: INTERVENTIONS:  -  Assess patient's ability to carry out ADLs; assess patient's baseline for ADL function and identify physical deficits which impact ability to perform ADLs (bathing, care of mouth/teeth, toileting, grooming, dressing, etc )  - Assess/evaluate cause of self-care deficits   - Assess range of motion  - Assess patient's mobility; develop plan if impaired  - Assess patient's need for assistive devices and provide as appropriate  - Encourage maximum independence but intervene and supervise when necessary  - Involve family in performance of ADLs  - Assess for home care needs following discharge   - Consider OT consult to assist with ADL evaluation and planning for discharge  - Provide patient education as appropriate  Outcome: Progressing  Goal: Maintains/Returns to pre admission functional level  Description: INTERVENTIONS:  - Perform BMAT or MOVE assessment daily    - Set and communicate daily mobility goal to care team and patient/family/caregiver  - Collaborate with rehabilitation services on mobility goals if consulted  - Record patient progress and toleration of activity level   Outcome: Progressing  Goal: Patient will remain free of falls  Description: INTERVENTIONS:  - Educate patient/family on patient safety including physical limitations  - Instruct patient to call for assistance with activity   - Consult OT/PT to assist with strengthening/mobility   - Keep Call bell within reach  - Keep bed low and locked with side rails adjusted as appropriate  - Keep care items and personal belongings within reach  - Initiate and maintain comfort rounds  - Make Fall Risk Sign visible to staff  - Apply yellow socks and bracelet for high fall risk patients  - Consider moving patient to room near nurses station  Outcome: Progressing     Problem: DISCHARGE PLANNING  Goal: Discharge to home or other facility with appropriate resources  Description: INTERVENTIONS:  - Identify barriers to discharge w/patient and caregiver  - Arrange for needed discharge resources and transportation as appropriate  - Identify discharge learning needs (meds, wound care, etc )  - Arrange for interpretive services to assist at discharge as needed  - Refer to Case Management Department for coordinating discharge planning if the patient needs post-hospital services based on physician/advanced practitioner order or complex needs related to functional status, cognitive ability, or social support system  Outcome: Progressing     Problem: Knowledge Deficit  Goal: Patient/family/caregiver demonstrates understanding of disease process, treatment plan, medications, and discharge instructions  Description: Complete learning assessment and assess knowledge base    Interventions:  - Provide teaching at level of understanding  - Provide teaching via preferred learning methods  Outcome: Progressing

## 2021-07-14 NOTE — PHYSICAL THERAPY NOTE
Physical Therapy Evaluation note     Patient Name: Ubaldo Metzger    ZJSJQ'R Date: 2021     Problem List  Principal Problem:    Panic disorder without agoraphobia  Active Problems:    Chronic bilateral low back pain with bilateral sciatica    Chronic pain syndrome    Bipolar II disorder ([facility])    Hypokalemia    Opioid dependence ([address] )    Post traumatic stress disorder    Tardive dyskinesia    Nausea    History of CVA (cerebrovascular accident)       Past Medical History  Past Medical History:   Diagnosis Date    Acid reflux     Anxiety     RESOLVED: [id number]    Arthritis     Bipolar 2 disorder ([facility])     FOLLOWS WITH PSYCHIATRIST  CONTINUE LAMOTRIGINE; RESOLVED: [id number]    Depression     Familial tremor     both hands    Fibromyalgia     LAST ASSESSED: [id number]    Hearing aid worn     left ear    [place] (hard of hearing)     left ear    Hypertension     Left-sided weakness     Lower back pain     Memory loss of unknown cause     long and short term    Migraine     Obesity     Obesity, Class II, BMI 35-39 9     Overactive bladder     Panic attack     Post traumatic stress disorder     Seasonal allergies     Stroke [facility])     questionable stroke 2009    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    CONT ASA 81 MG DAILY; RESOLVED: [id number]    Urinary incontinence     Wears dentures     partial lower / full upper    Wears glasses         Past Surgical History  Past Surgical History:   Procedure Laterality Date    BACK SURGERY       SECTION      COLONOSCOPY      RESOLVED: [id number]    EAR SURGERY      EGD      HYSTERECTOMY      MYRINGOTOMY W/ TUBES Left     NECK SURGERY  2019    NH CYSTOURETHROSCOPY N/A 2016    Procedure: CYSTOSCOPY, BOTOX INJECTION;  Surgeon: Damian Wiseman MD;  Location: [facility];  Service: Gynecology    NH IMPLANT SPINAL NEUROSTIM/ Right 2/10/2021 Procedure: REPLACEMENT IMPLANTABLE PULSE GENERATOR DORSAL SPINAL COLUMN STIMULATOR, RIGHT;  Surgeon: Denisha Read MD;  Location: [facility];  Service: Neurosurgery    NM PERCUT IMPLNT [address] Right 7/28/2020    Procedure: INSERTION THORACIC DORSAL COLUMN SPINAL CORD STIMULATOR PERCUTANEOUS W IMPLANTABLE PULSE GENERATOR, RIGHT;  Surgeon: Denisha Read MD;  [facility];  Service: Neurosurgery   [facility]    UPPER GASTROINTESTINAL ENDOSCOPY  09/2020 07/14/21 1126   PT Last Visit   PT Visit Date 07/14/21   Note Type   Note type Evaluation   Pain Assessment   Pain Assessment Tool 0-10   Pain Score 7   Pain Location/Orientation Location: Back   Home Living   Type of [address] One level   Additional Comments Pt lives with her son in a one story apartment with 2 SKY  Pt was I for ADL's and requried A with IADL's  Pt uses a rollator at baseline  Per patient son is home on leave but will be returning to work on 27th on July  Per CM will need to confirm assistance at home from family upon d/c due to son returning to work      Prior Function   Level of [place] Independent with ADLs and functional mobility   Lives With Son   Receives Help From Family   ADL Assistance Independent   IADLs Needs assistance   Falls in the last 6 months 0   Restrictions/Precautions   Weight Bearing Precautions Per Order No   Other Precautions Fall Risk;Pain   General   Family/Caregiver Present No   Cognition   Overall Cognitive Status WFL   Arousal/Participation Alert   Orientation Level Oriented X4   Memory Within functional limits   Following Commands Follows all commands and directions without difficulty   RLE Assessment   RLE Assessment X   Strength RLE   R Hip Flexion 3-/5   R Knee Extension 3-/5   LLE Assessment   LLE Assessment X   Strength LLE   L Hip Flexion 3-/5   L Knee Extension 3-/5   L Ankle Dorsiflexion 3/5   Light Touch   RLE Light Touch Impaired RLE Light Touch Comments tingling at baseline    LLE Light Touch Impaired   LLE Light Touch Comments tinglign at baseline    Bed Mobility   Supine to Sit 5  Supervision   Additional Comments sitting EOB at a S level   Transfers   Sit to Stand 5  Supervision   Stand to Sit 5  Supervision   Ambulation/Elevation   Gait pattern Excessively slow; Step to;Shuffling; Forward Flexion; Antalgic  (step to transition to step through when ambualting )   Gait Assistance 6  Modified independent   Assistive Device Rolling walker   Distance 50ftx1   Balance   Static Sitting Good   Dynamic Sitting Fair +   Static Standing Fair   Dynamic Standing Fair   Ambulatory Fair -   Endurance Deficit   Endurance Deficit Yes   Activity Tolerance   Activity Tolerance Patient limited by fatigue;Patient limited by pain   Nurse Made Aware nurse approved therapy session   Assessment   Prognosis Good   Problem List Decreased strength;Decreased endurance;Decreased mobility;Pain   Assessment Pt is a 61 yo female admitted to [facility] on 7/13/2021 s/p anxiety  Dx: hx of CVA, nausea, tardive dyskinesia, PTSD, opoid dependence, hypokalemia chronic pain syndrome, chronic B low back pain with B sciatica, panic disorder without agoraphobia  Two patient identifiers were used to confirm  Pt lives in a one story apartment with 2 SKY with her son  Pt's son is home at all times currently but will be returning to work shortly  Pt uses rollator at baseline  Pt requires A with IADL's  Pt was I for mobility and ADL's  Pt came from home and did not go to rehab after previous admission  Per CM patient's family has  Concerns about her D/C home  will need to follow up with CM about placement  Pt's impairments include reduced mobility, gait abnormalitites, risk of falling, pain  These impairments limit the ability of the patient to perform mobility without increased assistance, return to PLOF and participate in everyday life activities   Pt would benefit from continued skilled therapy while in the hospital to improve overall mobility and work towards a safe d/c  Recommend discharge to home with home PT and continued support from family  At the end of the session the patient was left in seated position with call bell and phone within reach  The patient's AM-PAC Basic Mobility Inpatient Short Form Raw Score is 20, Standardized Score is 43 99  A standardized score less than 42 9 suggests the patient may benefit from discharge to home  Please also refer to the recommendation of the Physical Therapist for safe discharge planning  Goals   STG Expiration Date 07/28/21   Short Term Goal #1 STG 1: Pt will perform transfers at a MI level to return to baseline of function  STG 2: Pt will ambulate 300ft with RW at a MI level to reduce the level of assistance needed upon d/c home  STG 3: Pt will negotiate 2 steps with HR at a MI level to ensure safety with activity when able to ambulate 50ft at a S level  STG 4: Pt will perform bed mobility at a I to safety return to PLOF  PT Treatment Day 0   Plan   Treatment/Interventions Functional transfer training;LE strengthening/ROM; Therapeutic exercise; Endurance training;Elevations; Bed mobility;Gait training;Equipment eval/education   PT Frequency Other (Comment)  (3-5xwk)   Recommendation   PT Discharge Recommendation Home with home health rehabilitation   PT - OK to Discharge No   Additional Comments would like to try steps    AM-PAC Basic Mobility Inpatient   Turning in Bed Without Bedrails 4   Lying on Back to Sitting on Edge of Flat Bed 4   Moving Bed to Chair 3   Standing Up From Chair 3   Walk in Room 3   Climb 3-5 Stairs 3   Basic Mobility Inpatient Raw Score 20   Basic Mobility Standardized Score 43 99   [place], PT, DPT

## 2021-07-14 NOTE — UTILIZATION REVIEW
Initial Clinical Review    Admission: Date/Time/Statement:   Admission Orders (From admission, onward)     Ordered        07/13/21 1704  Place in Observation  Once                   Orders Placed This Encounter   Procedures    Place in Observation     Standing Status:   Standing     Number of Occurrences:   1     Order Specific Question:   Level of Care     Answer:   Med Surg [16]     ED Arrival Information     Expected Arrival Acuity    - 7/13/2021 13:31 Urgent         Means of arrival Escorted by Service Admission type    Ambulance [facility] EMS General Medicine Urgent         Arrival complaint    Anxiety        Chief Complaint   Patient presents with    Anxiety     pt states " im having another panic attack    my dr said if it happens again i need to go to a facility, [facility]"       Initial Presentation:  61 yo female presented to ED from home via EMS as observation for panic attack without agoraphobia  Patient c/o  a panic attack, shortness of breath, and "pain everywhere " She has had panic attacks like this before and states she took 0 5mg Ativan and 20mg Buspar this morning to help without relief  Patient lives with her son who was concerned about her this afternoon and contacted EMS to bring her to the hospital  She states she has chronic back pain that is bothering her  She receives morphine and oxycodone from her PCP  She says she is due for a new script on 7/19 but ran out  She states she has a psychiatric history of anxiety and depression, and medical issues of hypertension and chronic pain  Plan  PMHx Chronic bilateral low back pain with bilateral sciatica, Chronic pain syndrome, Bipolar II disorder (HCC)    Hypokalemia  Opioid dependence ([address] )  Post traumatic stress disorder Tardive dyskinesia, Nausea, History of CVA    Plan supportive care      ED Triage Vitals   Temperature Pulse Respirations Blood Pressure SpO2   07/13/21 1335 07/13/21 1335 07/13/21 1335 07/13/21 1338 07/13/21 1335   98 3 °F (36 8 °C) (!) 140 20 165/86 100 %      Temp Source Heart Rate Source Patient Position - Orthostatic VS BP Location FiO2 (%)   07/13/21 1335 07/13/21 1335 07/13/21 1521 07/13/21 1521 --   Oral Monitor Lying Right arm       Pain Score       07/13/21 1416       Worst Possible Pain          Wt Readings from Last 1 Encounters:   07/13/21 91 6 kg (202 lb)     Additional Vital Signs:   Date/Time  Temp  Pulse  Resp  BP  MAP (mmHg)  SpO2  O2 Device  Patient Position - Orthostatic VS   07/14/21 0917  --  --  --  128/90  --  --  --  --   07/14/21 0700  98 1 °F (36 7 °C)  77  16  116/63  83  98 %  None (Room air)  Lying   07/13/21 2300  97 4 °F (36 3 °C)Abnormal   74  15  116/69  87  99 %  None (Room air)  Lying   07/13/21 2100  98 6 °F (37 °C)  68  16  113/60  81  99 %  None (Room air)  Lying   07/13/21 1521  --  103  18  140/81  --  100 %  None (Room air)  Lying       Pertinent Labs/Diagnostic Test Results:   Results from last 7 days   Lab Units 07/13/21  1645   SARS-COV-2  Negative     Results from last 7 days   Lab Units 07/13/21  1753 07/11/21  1710   WBC Thousand/uL  --  6 54   HEMOGLOBIN g/dL  --  14 0   HEMATOCRIT %  --  41 4   PLATELETS Thousands/uL 348 402*   NEUTROS ABS Thousands/µL  --  5 24         Results from last 7 days   Lab Units 07/13/21  1752 07/12/21  1123 07/11/21  1710   SODIUM mmol/L 141 138 140   POTASSIUM mmol/L 3 4* 3 3* 3 2*   CHLORIDE mmol/L 110* 108 110*   CO2 mmol/L 22 21 18*   ANION GAP mmol/L 9 9 12   BUN mg/dL 7 5 7   CREATININE mg/dL 0 63 0 74 0 85   EGFR ml/min/1 73sq m 115 104 88   CALCIUM mg/dL 8 9 9 0 9 1   MAGNESIUM mg/dL 1 8 1 9  --      Results from last 7 days   Lab Units 07/11/21  1710   AST U/L 12   ALT U/L 18   ALK PHOS U/L 104   TOTAL PROTEIN g/dL 7 8   ALBUMIN g/dL 3 7   TOTAL BILIRUBIN mg/dL 0 67         Results from last 7 days   Lab Units 07/13/21  1752 07/11/21  1710   GLUCOSE RANDOM mg/dL 104 146*       Results from last 7 days   Lab Units 07/11/21  1710   LIPASE u/L 80       ED Treatment:   Medication Administration from 07/13/2021 1331 to 07/13/2021 2132       Date/Time Order Dose Route Action     07/13/2021 1347 LORazepam (ATIVAN) tablet 1 mg 1 mg Oral Given     07/13/2021 1417 LORazepam (ATIVAN) injection 1 mg 1 mg Intravenous Given     07/13/2021 1416 morphine (PF) 10 mg/mL injection 6 mg 6 mg Intravenous Given     07/13/2021 1527 morphine (PF) 10 mg/mL injection 6 mg 6 mg Intravenous Given     07/13/2021 1526 acetaminophen (TYLENOL) tablet 975 mg 975 mg Oral Given     07/13/2021 1845 atorvastatin (LIPITOR) tablet 40 mg 40 mg Oral Given     07/13/2021 2009 Diclofenac Sodium (VOLTAREN) 1 % topical gel 2 g 2 g Topical Not Given     07/13/2021 1845 pregabalin (LYRICA) capsule 100 mg 100 mg Oral Given     07/13/2021 1735 acetaminophen (TYLENOL) tablet 975 mg 975 mg Oral Not Given     07/13/2021 2104 busPIRone (BUSPAR) tablet 20 mg 20 mg Oral Given     07/13/2021 1845 potassium chloride (K-DUR,KLOR-CON) CR tablet 40 mEq 40 mEq Oral Given     07/13/2021 2104 magnesium oxide (MAG-OX) tablet 800 mg 800 mg Oral Given        Past Medical History:   Diagnosis Date    Acid reflux     Anxiety     RESOLVED: [id number]    Arthritis     Bipolar 2 disorder (HCC)     FOLLOWS WITH PSYCHIATRIST  CONTINUE LAMOTRIGINE; RESOLVED: [id number]    Depression     Familial tremor     both hands    Fibromyalgia     LAST ASSESSED: [id number]    Hearing aid worn     left ear    [place] (hard of hearing)     left ear    Hypertension     Left-sided weakness     Lower back pain     Memory loss of unknown cause     long and short term    Migraine     Obesity     Obesity, Class II, BMI 35-39 9     Overactive bladder     Panic attack     Post traumatic stress disorder     Seasonal allergies     Stroke [facility])     questionable stroke 2009    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    CONT ASA 81 MG DAILY; RESOLVED: [id number]    Urinary incontinence  Wears dentures     partial lower / full upper    Wears glasses      Present on Admission:   Chronic bilateral low back pain with bilateral sciatica   Chronic pain syndrome   Hypokalemia   Opioid dependence (HCC)   Panic disorder without agoraphobia   Bipolar II disorder (HCC)   Nausea   Tardive dyskinesia   Post traumatic stress disorder      Admitting Diagnosis: Panic disorder without agoraphobia [F41 0]  Anxiety [F41 9]  Major depressive disorder, recurrent episode, moderate degree (HCC) [F33 1]  Polypharmacy [Z79 899]  Chronic back pain [M54 9, G89 29]  Age/Sex: 60 y o  female  Admission Orders:  Scheduled Medications:  acetaminophen, 975 mg, Oral, Q8H [address]  amLODIPine, 5 mg, Oral, Daily  aspirin, 81 mg, Oral, Daily  atorvastatin, 40 mg, Oral, Daily With Dinner  busPIRone, 20 mg, Oral, TID  Diclofenac Sodium, 2 g, Topical, 4x Daily  DULoxetine, 60 mg, Oral, Daily  enoxaparin, 40 mg, Subcutaneous, Daily  folic acid, 9,217 mcg, Oral, Daily  pantoprazole, 20 mg, Oral, Early Morning  potassium chloride, 20 mEq, Oral, Daily  prazosin, 2 mg, Oral, Daily  pregabalin, 100 mg, Oral, BID  QUEtiapine, 100 mg, Oral, HS  Valbenazine Tosylate, 80 mg, Oral, Daily      Continuous IV Infusions:     PRN Meds:  LORazepam, 0 5 mg, Oral, Q6H PRN  ondansetron, 4 mg, Oral, Q6H PRN  senna-docusate sodium, 1 tablet, Oral, Daily PRN        IP CONSULT TO CASE MANAGEMENT   PT/OT  [facility] [place] [place]    Network Utilization Review Department  ATTENTION: Please call with any questions or concerns to [phone] and carefully listen to the prompts so that you are directed to the right person  All voicemails are confidential   [place] Drop all requests for admission clinical reviews, approved or denied determinations and any other requests to dedicated fax number below belonging to the campus where the patient is receiving treatment   List of dedicated fax numbers for the Facilities:  FACILITY NAME UR FAX NUMBER   ADMISSION DENIALS (Administrative/[facility]) [phone]   [address] (Maternity/NICU/Pediatrics) [facility],7Th Floor [facility] 40 [facility]  [phone]   [address] [address] [place] [address] [address]za [address] [phone]   [address]  [phone]

## 2021-07-14 NOTE — PROGRESS NOTES
HIRAM had received a call back from [address] who clarified that pts AAA lOC was done by [facility] on 5/13/21 and pt was found Nursing home clinically eligible  These results were sent to the [facility]  HIRAM has  called [employer]/ [employer] [place] Waiver Program again this date and spoke to Sanjeev Clark and she informed SW pt was APPROVED as of 6/28/21 and a letter was sent out to pt 6/29/21  [facility] Cobb is [facility] and would be her Service Coordination Agency  The [facility] confirmed pt is both medically and financially approved  The next step would for the  to meet with pt/family and set up a plan which the insurance would need to approve  This can still take a while to coordinate  HIRAM has called pt's son Calvin Pillai and has informed him of same  SW provided the phone # for Vitor Cobb for when ever pt is well enough to return home as she is presently an IN PT and maybe needing short term NH Placement  Son also relates he has spoken to her OP [address] Case Bernie Sweeney  through [facility] office  Son remain very concerned re pt's [address] issues  Sw has encouraged pt/son to f/u with pt's current inpt medical team re their recommendations  When appropriate son appears dedicated to helping to care for his Mother  OP CM will remain available to assist as indicated

## 2021-07-15 ENCOUNTER — HOSPITAL ENCOUNTER (INPATIENT)
Facility: HOSPITAL | Age: 56
LOS: 21 days | Discharge: HOME | DRG: 885 | End: 2021-08-05
Attending: PSYCHIATRY & NEUROLOGY | Admitting: PSYCHIATRY & NEUROLOGY
Payer: COMMERCIAL

## 2021-07-15 VITALS
SYSTOLIC BLOOD PRESSURE: 144 MMHG | HEIGHT: 61 IN | OXYGEN SATURATION: 100 % | RESPIRATION RATE: 20 BRPM | TEMPERATURE: 97.4 F | WEIGHT: 202 LBS | BODY MASS INDEX: 38.14 KG/M2 | DIASTOLIC BLOOD PRESSURE: 76 MMHG | HEART RATE: 103 BPM

## 2021-07-15 DIAGNOSIS — Z86.73 HISTORY OF CVA (CEREBROVASCULAR ACCIDENT): ICD-10-CM

## 2021-07-15 DIAGNOSIS — F31.81 BIPOLAR II DISORDER (HCC): ICD-10-CM

## 2021-07-15 DIAGNOSIS — Z00.8 MEDICAL CLEARANCE FOR PSYCHIATRIC ADMISSION: Primary | ICD-10-CM

## 2021-07-15 DIAGNOSIS — R41.82 ALTERED MENTAL STATUS: ICD-10-CM

## 2021-07-15 LAB
ANION GAP SERPL CALCULATED.3IONS-SCNC: 6 MMOL/L (ref 4–13)
BASOPHILS # BLD AUTO: 0.03 THOUSANDS/ΜL (ref 0–0.1)
BASOPHILS NFR BLD AUTO: 1 % (ref 0–1)
BUN SERPL-MCNC: 6 MG/DL (ref 5–25)
CALCIUM SERPL-MCNC: 8.6 MG/DL (ref 8.3–10.1)
CHLORIDE SERPL-SCNC: 113 MMOL/L (ref 100–108)
CO2 SERPL-SCNC: 20 MMOL/L (ref 21–32)
CREAT SERPL-MCNC: 0.6 MG/DL (ref 0.6–1.3)
EOSINOPHIL # BLD AUTO: 0.06 THOUSAND/ΜL (ref 0–0.61)
EOSINOPHIL NFR BLD AUTO: 2 % (ref 0–6)
ERYTHROCYTE [DISTWIDTH] IN BLOOD BY AUTOMATED COUNT: 13.9 % (ref 11.6–15.1)
GFR SERPL CREATININE-BSD FRML MDRD: 117 ML/MIN/1.73SQ M
GLUCOSE SERPL-MCNC: 101 MG/DL (ref 65–140)
HCT VFR BLD AUTO: 42.9 % (ref 34.8–46.1)
HGB BLD-MCNC: 14.3 G/DL (ref 11.5–15.4)
IMM GRANULOCYTES # BLD AUTO: 0.02 THOUSAND/UL (ref 0–0.2)
IMM GRANULOCYTES NFR BLD AUTO: 1 % (ref 0–2)
LYMPHOCYTES # BLD AUTO: 0.82 THOUSANDS/ΜL (ref 0.6–4.47)
LYMPHOCYTES NFR BLD AUTO: 27 % (ref 14–44)
MCH RBC QN AUTO: 29.4 PG (ref 26.8–34.3)
MCHC RBC AUTO-ENTMCNC: 33.3 G/DL (ref 31.4–37.4)
MCV RBC AUTO: 88 FL (ref 82–98)
MONOCYTES # BLD AUTO: 0.16 THOUSAND/ΜL (ref 0.17–1.22)
MONOCYTES NFR BLD AUTO: 5 % (ref 4–12)
NEUTROPHILS # BLD AUTO: 1.99 THOUSANDS/ΜL (ref 1.85–7.62)
NEUTS SEG NFR BLD AUTO: 64 % (ref 43–75)
NRBC BLD AUTO-RTO: 0 /100 WBCS
PLATELET # BLD AUTO: 348 THOUSANDS/UL (ref 149–390)
PMV BLD AUTO: 9.3 FL (ref 8.9–12.7)
POTASSIUM SERPL-SCNC: 3.9 MMOL/L (ref 3.5–5.3)
RBC # BLD AUTO: 4.87 MILLION/UL (ref 3.81–5.12)
SODIUM SERPL-SCNC: 139 MMOL/L (ref 136–145)
WBC # BLD AUTO: 3.08 THOUSAND/UL (ref 4.31–10.16)

## 2021-07-15 PROCEDURE — 99224 PR SBSQ OBSERVATION CARE/DAY 15 MINUTES: CPT | Performed by: INTERNAL MEDICINE

## 2021-07-15 PROCEDURE — 99204 OFFICE O/P NEW MOD 45 MIN: CPT | Performed by: PSYCHIATRY & NEUROLOGY

## 2021-07-15 PROCEDURE — NC001 PR NO CHARGE: Performed by: INTERNAL MEDICINE

## 2021-07-15 PROCEDURE — 80048 BASIC METABOLIC PNL TOTAL CA: CPT | Performed by: STUDENT IN AN ORGANIZED HEALTH CARE EDUCATION/TRAINING PROGRAM

## 2021-07-15 PROCEDURE — 85025 COMPLETE CBC W/AUTO DIFF WBC: CPT | Performed by: STUDENT IN AN ORGANIZED HEALTH CARE EDUCATION/TRAINING PROGRAM

## 2021-07-15 RX ORDER — DULOXETIN HYDROCHLORIDE 60 MG/1
60 CAPSULE, DELAYED RELEASE ORAL DAILY
Status: CANCELLED | OUTPATIENT
Start: 2021-07-16

## 2021-07-15 RX ORDER — POTASSIUM CHLORIDE 20MEQ/15ML
20 LIQUID (ML) ORAL DAILY
Status: DISCONTINUED | OUTPATIENT
Start: 2021-07-16 | End: 2021-07-18

## 2021-07-15 RX ORDER — AMLODIPINE BESYLATE 5 MG/1
5 TABLET ORAL DAILY
Status: DISCONTINUED | OUTPATIENT
Start: 2021-07-16 | End: 2021-08-05 | Stop reason: HOSPADM

## 2021-07-15 RX ORDER — PREGABALIN 50 MG/1
100 CAPSULE ORAL 2 TIMES DAILY
Status: CANCELLED | OUTPATIENT
Start: 2021-07-15

## 2021-07-15 RX ORDER — BUSPIRONE HYDROCHLORIDE 10 MG/1
20 TABLET ORAL 3 TIMES DAILY
Status: CANCELLED | OUTPATIENT
Start: 2021-07-15

## 2021-07-15 RX ORDER — BENZTROPINE MESYLATE 1 MG/ML
1 INJECTION INTRAMUSCULAR; INTRAVENOUS
Status: CANCELLED | OUTPATIENT
Start: 2021-07-15

## 2021-07-15 RX ORDER — ONDANSETRON 4 MG/1
4 TABLET, ORALLY DISINTEGRATING ORAL EVERY 6 HOURS PRN
Status: CANCELLED | OUTPATIENT
Start: 2021-07-15

## 2021-07-15 RX ORDER — LORAZEPAM 1 MG/1
1 TABLET ORAL
Status: CANCELLED | OUTPATIENT
Start: 2021-07-15

## 2021-07-15 RX ORDER — FOLIC ACID 1 MG/1
1000 TABLET ORAL DAILY
Status: CANCELLED | OUTPATIENT
Start: 2021-07-16

## 2021-07-15 RX ORDER — LIDOCAINE 50 MG/G
1 PATCH TOPICAL DAILY
Status: DISCONTINUED | OUTPATIENT
Start: 2021-07-16 | End: 2021-07-18

## 2021-07-15 RX ORDER — PREGABALIN 50 MG/1
100 CAPSULE ORAL 2 TIMES DAILY
Status: DISCONTINUED | OUTPATIENT
Start: 2021-07-16 | End: 2021-08-05 | Stop reason: HOSPADM

## 2021-07-15 RX ORDER — LORAZEPAM 2 MG/ML
1 INJECTION INTRAMUSCULAR
Status: CANCELLED | OUTPATIENT
Start: 2021-07-15

## 2021-07-15 RX ORDER — BENZTROPINE MESYLATE 0.5 MG/1
0.5 TABLET ORAL
Status: CANCELLED | OUTPATIENT
Start: 2021-07-15

## 2021-07-15 RX ORDER — ONDANSETRON 4 MG/1
4 TABLET, ORALLY DISINTEGRATING ORAL EVERY 6 HOURS PRN
Status: DISCONTINUED | OUTPATIENT
Start: 2021-07-15 | End: 2021-08-05 | Stop reason: HOSPADM

## 2021-07-15 RX ORDER — MAGNESIUM HYDROXIDE/ALUMINUM HYDROXICE/SIMETHICONE 120; 1200; 1200 MG/30ML; MG/30ML; MG/30ML
30 SUSPENSION ORAL EVERY 4 HOURS PRN
Status: DISCONTINUED | OUTPATIENT
Start: 2021-07-15 | End: 2021-08-05 | Stop reason: HOSPADM

## 2021-07-15 RX ORDER — MIRTAZAPINE 15 MG/1
7.5 TABLET, FILM COATED ORAL
Status: CANCELLED | OUTPATIENT
Start: 2021-07-15

## 2021-07-15 RX ORDER — LIDOCAINE 50 MG/G
1 PATCH TOPICAL DAILY
Status: CANCELLED | OUTPATIENT
Start: 2021-07-16

## 2021-07-15 RX ORDER — PRAZOSIN HYDROCHLORIDE 2 MG/1
2 CAPSULE ORAL DAILY
Status: CANCELLED | OUTPATIENT
Start: 2021-07-16

## 2021-07-15 RX ORDER — HYDROXYZINE HYDROCHLORIDE 25 MG/1
25 TABLET, FILM COATED ORAL
Status: CANCELLED | OUTPATIENT
Start: 2021-07-15

## 2021-07-15 RX ORDER — LORAZEPAM 0.5 MG/1
0.5 TABLET ORAL
Status: DISCONTINUED | OUTPATIENT
Start: 2021-07-15 | End: 2021-07-19

## 2021-07-15 RX ORDER — LORAZEPAM 0.5 MG/1
0.5 TABLET ORAL
Status: CANCELLED | OUTPATIENT
Start: 2021-07-15

## 2021-07-15 RX ORDER — ATORVASTATIN CALCIUM 40 MG/1
40 TABLET, FILM COATED ORAL
Status: CANCELLED | OUTPATIENT
Start: 2021-07-15

## 2021-07-15 RX ORDER — OLANZAPINE 10 MG/1
5 INJECTION, POWDER, LYOPHILIZED, FOR SOLUTION INTRAMUSCULAR
Status: DISCONTINUED | OUTPATIENT
Start: 2021-07-15 | End: 2021-08-05 | Stop reason: HOSPADM

## 2021-07-15 RX ORDER — MORPHINE SULFATE 15 MG/1
15 TABLET, FILM COATED, EXTENDED RELEASE ORAL EVERY 12 HOURS SCHEDULED
Status: DISCONTINUED | OUTPATIENT
Start: 2021-07-15 | End: 2021-07-15 | Stop reason: HOSPADM

## 2021-07-15 RX ORDER — ASPIRIN 81 MG/1
81 TABLET, CHEWABLE ORAL DAILY
Status: DISCONTINUED | OUTPATIENT
Start: 2021-07-16 | End: 2021-08-05 | Stop reason: HOSPADM

## 2021-07-15 RX ORDER — POTASSIUM CHLORIDE 20MEQ/15ML
20 LIQUID (ML) ORAL DAILY
Status: CANCELLED | OUTPATIENT
Start: 2021-07-16

## 2021-07-15 RX ORDER — LIDOCAINE 50 MG/G
1 PATCH TOPICAL DAILY
Status: DISCONTINUED | OUTPATIENT
Start: 2021-07-15 | End: 2021-07-15 | Stop reason: HOSPADM

## 2021-07-15 RX ORDER — BUSPIRONE HYDROCHLORIDE 10 MG/1
20 TABLET ORAL 3 TIMES DAILY
Status: DISCONTINUED | OUTPATIENT
Start: 2021-07-15 | End: 2021-07-19

## 2021-07-15 RX ORDER — ACETAMINOPHEN 325 MG/1
975 TABLET ORAL EVERY 8 HOURS SCHEDULED
Status: CANCELLED | OUTPATIENT
Start: 2021-07-15

## 2021-07-15 RX ORDER — ACETAMINOPHEN 325 MG/1
975 TABLET ORAL EVERY 8 HOURS SCHEDULED
Status: DISCONTINUED | OUTPATIENT
Start: 2021-07-15 | End: 2021-08-05 | Stop reason: HOSPADM

## 2021-07-15 RX ORDER — LORAZEPAM 1 MG/1
1 TABLET ORAL
Status: DISCONTINUED | OUTPATIENT
Start: 2021-07-15 | End: 2021-07-19

## 2021-07-15 RX ORDER — LORAZEPAM 2 MG/ML
1 INJECTION INTRAMUSCULAR
Status: DISCONTINUED | OUTPATIENT
Start: 2021-07-15 | End: 2021-08-02

## 2021-07-15 RX ORDER — OLANZAPINE 5 MG/1
5 TABLET ORAL
Status: DISCONTINUED | OUTPATIENT
Start: 2021-07-15 | End: 2021-08-05 | Stop reason: HOSPADM

## 2021-07-15 RX ORDER — QUETIAPINE FUMARATE 25 MG/1
100 TABLET, FILM COATED ORAL
Status: CANCELLED | OUTPATIENT
Start: 2021-07-15

## 2021-07-15 RX ORDER — ATORVASTATIN CALCIUM 40 MG/1
40 TABLET, FILM COATED ORAL
Status: DISCONTINUED | OUTPATIENT
Start: 2021-07-16 | End: 2021-08-05 | Stop reason: HOSPADM

## 2021-07-15 RX ORDER — BENZTROPINE MESYLATE 1 MG/ML
1 INJECTION INTRAMUSCULAR; INTRAVENOUS
Status: DISCONTINUED | OUTPATIENT
Start: 2021-07-15 | End: 2021-08-05 | Stop reason: HOSPADM

## 2021-07-15 RX ORDER — AMOXICILLIN 250 MG
1 CAPSULE ORAL DAILY PRN
Status: DISCONTINUED | OUTPATIENT
Start: 2021-07-15 | End: 2021-08-05 | Stop reason: HOSPADM

## 2021-07-15 RX ORDER — OLANZAPINE 5 MG/1
5 TABLET ORAL
Status: CANCELLED | OUTPATIENT
Start: 2021-07-15

## 2021-07-15 RX ORDER — MIRTAZAPINE 7.5 MG/1
7.5 TABLET, FILM COATED ORAL
Status: DISCONTINUED | OUTPATIENT
Start: 2021-07-15 | End: 2021-08-05 | Stop reason: HOSPADM

## 2021-07-15 RX ORDER — AMOXICILLIN 250 MG
1 CAPSULE ORAL DAILY PRN
Status: CANCELLED | OUTPATIENT
Start: 2021-07-15

## 2021-07-15 RX ORDER — MORPHINE SULFATE 15 MG/1
15 TABLET, FILM COATED, EXTENDED RELEASE ORAL EVERY 12 HOURS SCHEDULED
Status: CANCELLED | OUTPATIENT
Start: 2021-07-15

## 2021-07-15 RX ORDER — HYDROXYZINE HYDROCHLORIDE 25 MG/1
25 TABLET, FILM COATED ORAL
Status: DISCONTINUED | OUTPATIENT
Start: 2021-07-15 | End: 2021-07-19 | Stop reason: SDUPTHER

## 2021-07-15 RX ORDER — FOLIC ACID 1 MG/1
1000 TABLET ORAL DAILY
Status: DISCONTINUED | OUTPATIENT
Start: 2021-07-16 | End: 2021-08-05 | Stop reason: HOSPADM

## 2021-07-15 RX ORDER — PANTOPRAZOLE SODIUM 20 MG/1
20 TABLET, DELAYED RELEASE ORAL
Status: CANCELLED | OUTPATIENT
Start: 2021-07-16

## 2021-07-15 RX ORDER — DULOXETIN HYDROCHLORIDE 60 MG/1
60 CAPSULE, DELAYED RELEASE ORAL DAILY
Status: DISCONTINUED | OUTPATIENT
Start: 2021-07-16 | End: 2021-07-16

## 2021-07-15 RX ORDER — BENZTROPINE MESYLATE 0.5 MG/1
0.5 TABLET ORAL
Status: DISCONTINUED | OUTPATIENT
Start: 2021-07-15 | End: 2021-08-05 | Stop reason: HOSPADM

## 2021-07-15 RX ORDER — OLANZAPINE 10 MG/1
5 INJECTION, POWDER, LYOPHILIZED, FOR SOLUTION INTRAMUSCULAR
Status: CANCELLED | OUTPATIENT
Start: 2021-07-15

## 2021-07-15 RX ORDER — PANTOPRAZOLE SODIUM 20 MG/1
20 TABLET, DELAYED RELEASE ORAL
Status: DISCONTINUED | OUTPATIENT
Start: 2021-07-16 | End: 2021-08-05 | Stop reason: HOSPADM

## 2021-07-15 RX ORDER — QUETIAPINE FUMARATE 100 MG/1
100 TABLET, FILM COATED ORAL
Status: DISCONTINUED | OUTPATIENT
Start: 2021-07-15 | End: 2021-07-28

## 2021-07-15 RX ORDER — ASPIRIN 81 MG/1
81 TABLET, CHEWABLE ORAL DAILY
Status: CANCELLED | OUTPATIENT
Start: 2021-07-16

## 2021-07-15 RX ORDER — MAGNESIUM HYDROXIDE/ALUMINUM HYDROXICE/SIMETHICONE 120; 1200; 1200 MG/30ML; MG/30ML; MG/30ML
30 SUSPENSION ORAL EVERY 4 HOURS PRN
Status: CANCELLED | OUTPATIENT
Start: 2021-07-15

## 2021-07-15 RX ORDER — PRAZOSIN HYDROCHLORIDE 1 MG/1
2 CAPSULE ORAL DAILY
Status: DISCONTINUED | OUTPATIENT
Start: 2021-07-16 | End: 2021-08-05 | Stop reason: HOSPADM

## 2021-07-15 RX ORDER — LORAZEPAM 0.5 MG/1
0.5 TABLET ORAL ONCE
Status: COMPLETED | OUTPATIENT
Start: 2021-07-15 | End: 2021-07-15

## 2021-07-15 RX ORDER — MORPHINE SULFATE 15 MG/1
15 TABLET, FILM COATED, EXTENDED RELEASE ORAL EVERY 12 HOURS SCHEDULED
Status: DISCONTINUED | OUTPATIENT
Start: 2021-07-15 | End: 2021-08-05 | Stop reason: HOSPADM

## 2021-07-15 RX ORDER — AMLODIPINE BESYLATE 5 MG/1
5 TABLET ORAL DAILY
Status: CANCELLED | OUTPATIENT
Start: 2021-07-16

## 2021-07-15 RX ADMIN — DICLOFENAC SODIUM 2 G: 10 GEL TOPICAL at 17:35

## 2021-07-15 RX ADMIN — LORAZEPAM 0.5 MG: 0.5 TABLET ORAL at 16:47

## 2021-07-15 RX ADMIN — ACETAMINOPHEN 975 MG: 325 TABLET ORAL at 21:27

## 2021-07-15 RX ADMIN — PREGABALIN 100 MG: 50 CAPSULE ORAL at 17:34

## 2021-07-15 RX ADMIN — LIDOCAINE 5% 1 PATCH: 700 PATCH TOPICAL at 12:35

## 2021-07-15 RX ADMIN — AMLODIPINE BESYLATE 5 MG: 5 TABLET ORAL at 08:02

## 2021-07-15 RX ADMIN — ACETAMINOPHEN 975 MG: 325 TABLET, FILM COATED ORAL at 13:14

## 2021-07-15 RX ADMIN — MORPHINE SULFATE 15 MG: 15 TABLET, FILM COATED, EXTENDED RELEASE ORAL at 13:14

## 2021-07-15 RX ADMIN — ATORVASTATIN CALCIUM 40 MG: 40 TABLET, FILM COATED ORAL at 16:07

## 2021-07-15 RX ADMIN — POTASSIUM CHLORIDE 20 MEQ: 20 SOLUTION ORAL at 08:02

## 2021-07-15 RX ADMIN — PREGABALIN 100 MG: 50 CAPSULE ORAL at 08:02

## 2021-07-15 RX ADMIN — MORPHINE SULFATE 15 MG: 15 TABLET, EXTENDED RELEASE ORAL at 21:28

## 2021-07-15 RX ADMIN — DICLOFENAC SODIUM 2 G: 10 GEL TOPICAL at 08:03

## 2021-07-15 RX ADMIN — QUETIAPINE FUMARATE 100 MG: 100 TABLET ORAL at 21:28

## 2021-07-15 RX ADMIN — ASPIRIN 81 MG: 81 TABLET, CHEWABLE ORAL at 08:02

## 2021-07-15 RX ADMIN — PRAZOSIN HYDROCHLORIDE 2 MG: 2 CAPSULE ORAL at 08:03

## 2021-07-15 RX ADMIN — LORAZEPAM 0.5 MG: 0.5 TABLET ORAL at 08:01

## 2021-07-15 RX ADMIN — LORAZEPAM 0.5 MG: 0.5 TABLET ORAL at 14:52

## 2021-07-15 RX ADMIN — PANTOPRAZOLE SODIUM 20 MG: 20 TABLET, DELAYED RELEASE ORAL at 05:45

## 2021-07-15 RX ADMIN — FOLIC ACID 1000 MCG: 1 TABLET ORAL at 08:02

## 2021-07-15 RX ADMIN — ENOXAPARIN SODIUM 40 MG: 40 INJECTION SUBCUTANEOUS at 08:01

## 2021-07-15 RX ADMIN — ACETAMINOPHEN 975 MG: 325 TABLET, FILM COATED ORAL at 05:45

## 2021-07-15 RX ADMIN — ONDANSETRON 4 MG: 4 TABLET, ORALLY DISINTEGRATING ORAL at 08:07

## 2021-07-15 RX ADMIN — BUSPIRONE HYDROCHLORIDE 20 MG: 10 TABLET ORAL at 16:07

## 2021-07-15 RX ADMIN — BUSPIRONE HYDROCHLORIDE 20 MG: 10 TABLET ORAL at 08:02

## 2021-07-15 RX ADMIN — DULOXETINE HYDROCHLORIDE 60 MG: 60 CAPSULE, DELAYED RELEASE ORAL at 08:03

## 2021-07-15 RX ADMIN — VALBENAZINE 80 MG: 80 CAPSULE ORAL at 08:04

## 2021-07-15 RX ADMIN — BUSPIRONE HYDROCHLORIDE 20 MG: 10 TABLET ORAL at 21:28

## 2021-07-15 NOTE — OCCUPATIONAL THERAPY NOTE
Occupational Therapy         Patient Name: Aashish Gonzalez  DGVME'B Date: 7/15/2021       07/15/21 1500   OT Last Visit   OT Visit Date 07/15/21   Note Type   Note type Screen   Cancel Reasons Other     Pt admitted with re-current panic attacks/anxiety - currently functioning at SBA/Mod I level and awaiting 201 to psych - no acute OT needs indicated at this time   D/c from caseload    [facility]

## 2021-07-15 NOTE — CASE MANAGEMENT
CM arranged transport via S to [facility] for IP Psych  CM arranged transport through [facility] with a pickup time for 6:00 pm      CM will let RN and MD know to put DC orders in   confirmed with Mady that CM does not need an authorization number to send Pt  Pt can be sent while pre-cert is being reviewed  CM will continue to follow and review IMM rights form with Pt's son

## 2021-07-15 NOTE — CASE MANAGEMENT
CM spoke with Luz Cody, intake at Crisis with [facility] and stated that there will be bed available for the Pt tonight in 6B  CM will fax all paperwork for review for Crisis including, 201 and all clinical information  Pt is medically stable and ready to DC to IP Psych  CM is following Pt closely

## 2021-07-15 NOTE — TRANSPORTATION MEDICAL NECESSITY
Section I - General Information    Name of Patient: Mian Johnson                 : 1965    Medicare #: [id number]  Transport Date: 07/15/21 (PCS is valid for round trips on this date and for all repetitive trips in the 60-day range as noted below )  Origin: [facility]                                                         Destination: [facility] [place] [facility]   Is the pt's stay covered under Medicare Part A (PPS/DRG)   []     Closest appropriate facility? If no, why is transport to more distant facility required? Yes  If hospice pt, is this transport related to pt's terminal illness? No       Section II - Medical Necessity Questionnaire  Ambulance transportation is medically necessary only if other means of transport are contraindicated or would be potentially harmful to the patient  To meet this requirement, the patient must either be "bed confined" or suffer from a condition such that transport by means other than ambulance is contraindicated by the patient's condition  The following questions must be answered by the medical professional signing below for this form to be valid:    1)  Describe the MEDICAL CONDITION (physical and/or mental) of this patient AT [address] [place] [address] that requires the patient to be transported in an ambulance and why transport by other means is contraindicated by the patient's condition: confused, anxious, Pt is on a 201, dx is Panic disorder without agoraphobia  2) Is the patient "bed confined" as defined below? Yes  To be "be confined" the patient must satisfy all three of the following conditions: (1) unable to get up from bed without Assistance; AND (2) unable to ambulate; AND (3) unable to sit in a chair or wheelchair  3) Can this patient safely be transported by car or wheelchair van (i e , seated during transport without a medical attendant or monitoring)?    No    4) In addition to completing questions 1-3 above, please check any of the following conditions that apply*:   *Note: supporting documentation for any boxes checked must be maintained in the patient's medical records  If hosp-hosp transfer, describe services needed at 2nd facility not available at 1st facility? Patient is confused  Moderate/severe pain on movement   Danger to self/others  Need or possible need for restraints    Unable to sit for long periods of time      Section III - Signature of Physician or Healthcare Professional  I certify that the above information is true and correct based on my evaluation of this patient, and represent that the patient requires transport by ambulance and that other forms of transport are contraindicated  I understand that this information will be used by the Centers for Medicare and Medicaid Services (CMS) to support the determination of medical necessity for ambulance services, and I represent that I have personal knowledge of the patient's condition at time of transport  [x]  If this box is checked, I also certify that the patient is physically or mentally incapable of signing the ambulance service's claim and that the institution with which I am affiliated has furnished care, services, or assistance to the patient  My signature below is made on behalf of the patient pursuant to 42 CFR §424 36(b)(4)  In accordance with 42 CFR §424 37, the specific reason(s) that the patient is physically or mentally incapable of signing the claim form is as follows: confused and Pt is on a 201        Signature of Physician* or Healthcare Professional__X____________________________________________________________  Signature Date 07/15/21 (For scheduled repetitive transports, this form is not valid for transports performed more than 60 days after this date)    Printed Name & Credentials of Physician or Healthcare Professional (MD, DO, RN, etc )____ANDREW Gary ____________________________  *Form must be signed by patient's attending physician for scheduled, repetitive transports   For non-repetitive, unscheduled ambulance transports, if unable to obtain the signature of the attending physician, any of the following may sign (choose appropriate option below)  [] Physician Assistant []  Clinical Nurse Specialist []  Registered Nurse  []  Nurse Practitioner  [x] Discharge Planner
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
## 2021-07-15 NOTE — PROGRESS NOTES
INTERNAL MEDICINE RESIDENCY PROGRESS NOTE     Name: Dipti Whiting   Age & Sex: 60 y o  female   MRN: [id number]  Unit/Bed#: [facility] 307-01   Encounter: [id number]  Team: SOD Team A    PATIENT INFORMATION     Name: Dipti Whiting   Age & Sex: 60 y o  female   MRN: [id number]  Hospital Stay Days: 0    ASSESSMENT/PLAN     Principal Problem:    Panic disorder without agoraphobia  Active Problems:    Chronic bilateral low back pain with bilateral sciatica    Chronic pain syndrome    Bipolar II disorder ([facility])    Hypokalemia    Opioid dependence ([address] )    Post traumatic stress disorder    Tardive dyskinesia    Nausea    History of CVA (cerebrovascular accident)      History of CVA (cerebrovascular accident)  Assessment & Plan  A:  Reported CVA in 2010    P:  Aspirin 81 mg daily  Lovastatin 40 mg daily      Nausea  Assessment & Plan  Nausea likely secondary to panic disorder or opiate withdrawal    Zofran 4 mg q 6h p r n  Tardive dyskinesia  Assessment & Plan  Valbenazine 80 mg as per home regimen    Post traumatic stress disorder  Assessment & Plan  Prazosin 2 mg PTSD    Opioid dependence ([facility])  Assessment & Plan  A:   COWS Score 4  History of opioid dependence for chronic pain  Per PDMP, last refill 06/25/2021  She was admitted to the hospital 06/27/2021 and was discharged on 07/07/2021  Due to stating they still have pain medication at home, she was not discharged on any new scripts after her last visit  The patient currently reports running out of her home medications, expected refill date 07/19/2021 per Dr Aiden Vera  P:  Monitor for signs of withdrawal  Treat for withdrawal accordingly as necessary  Not on home opioids  Senokot 8 6-50 mg p r n  for chronic constipation secondary to opioid use  Hypokalemia  Assessment & Plan  A: Potassium 3 4 on admission, 4 1 today       P:   Potassium chloride 20 mEq capsule daily starting tomorrow  Continue to monitor with daily BMP checks  Replete as needed    Bipolar II disorder ([address] )  Assessment & Plan  History of bipolar 2 disorder controlled on home medications    Quetiapine 100 mg daily at bedtime    Chronic pain syndrome  Assessment & Plan  A:  Chronic pain uncontrolled     P:  Voltaren gel 1% topical 2g, 4 times a day  Duloxetine 60 mg  Pregabalin 100 mg b i d  Tylenol 424 mg q 8  Added Licodaine patch     Chronic bilateral low back pain with bilateral sciatica  Assessment & Plan  A:  History of lumbar stenosis  Status post spinal cord stimulator    P:  See chronic pain syndrome    * Panic disorder without agoraphobia  Assessment & Plan  A:   Presented with symptoms of panic attack:  Chest pain, nausea, sweating, numbness in the hands and feet, shortness of breath  Known history    P:  Ativan 0 5 mg q 6h p r n  Buspar 20 mg t i d   Monitor for symptoms    201 signed, patient will be transferred to inpatient psych facility        Disposition:  Continue in patient management and control of anxiety  Psych consulted, 201 signed  Pending transfer to inpatient psych facility  SUBJECTIVE     No acute events overnight  Patient was having panic attack when seen today  She noted heightened anxiety and chest pain  She also noted back pain  Patient received her Ativan around 1 hour prior  After roughly 15 minutes she was able to regain control after being calmed by staff in the room  She mentions she is tired of having these attacks and is worried she'll never go back to living a normal life  ROS and Physical Exam was unable to be obtained at this time       OBJECTIVE     Vitals:    21 0917 21 1500 21 2254 07/15/21 0700   BP: 128/90 109/55 115/64 133/80   BP Location:  Left arm Left arm Left arm   Pulse:  72 75 79   Resp:  16 16 15   Temp:  98 5 °F (36 9 °C) 98 4 °F (36 9 °C) 98 4 °F (36 9 °C)   TempSrc:  Oral Oral Oral   SpO2:  92% 97% 100%   Weight:       Height:          Temperature:   Temp (24hrs), Av 4 °F (36 9 °C), Min:98 4 °F (36 9 °C), Max:98 5 °F (36 9 °C)    Temperature: 98 4 °F (36 9 °C)  Intake & Output:  I/O       07/13 0701 - 07/14 0700 07/14 0701 - 07/15 0700 07/15 0701 - 07/16 0700    P  O   2231 715    Total Intake(mL/kg)  2231 (24 4) 715 (7 8)    Net  +2231 +715           Unmeasured Urine Occurrence 1 x 1 x         Weights:   IBW (Ideal Body Weight): 47 8 kg    Body mass index is 38 17 kg/m²  Weight (last 2 days)     Date/Time   Weight    07/13/21 2100   91 6 (202)    07/13/21 1335   89 8 (198)            Physical Exam  Vitals reviewed  LABORATORY DATA     Labs: I have personally reviewed pertinent reports  Results from last 7 days   Lab Units 07/15/21  0943 07/14/21  1102 07/13/21  1753 07/11/21  1710   WBC Thousand/uL 3 08* 3 88*  --  6 54   HEMOGLOBIN g/dL 14 3 14 1  --  14 0   HEMATOCRIT % 42 9 42 4  --  41 4   PLATELETS Thousands/uL 348 371 348 402*   NEUTROS PCT % 64  --   --  79*   MONOS PCT % 5  --   --  5      Results from last 7 days   Lab Units 07/15/21  0636 07/14/21  1102 07/13/21  1752 07/11/21  1710   POTASSIUM mmol/L 3 9 4 1 3 4* 3 2*   CHLORIDE mmol/L 113* 111* 110* 110*   CO2 mmol/L 20* 20* 22 18*   BUN mg/dL 6 8 7 7   CREATININE mg/dL 0 60 0 80 0 63 0 85   CALCIUM mg/dL 8 6 9 0 8 9 9 1   ALK PHOS U/L  --   --   --  104   ALT U/L  --   --   --  18   AST U/L  --   --   --  12     Results from last 7 days   Lab Units 07/13/21 1752 07/12/21  1123   MAGNESIUM mg/dL 1 8 1 9                        IMAGING & DIAGNOSTIC TESTING     Radiology Results: I have personally reviewed pertinent reports  No results found  Other Diagnostic Testing: I have personally reviewed pertinent reports      ACTIVE MEDICATIONS     Current Facility-Administered Medications   Medication Dose Route Frequency    acetaminophen (TYLENOL) tablet 975 mg  975 mg Oral Q8H [address]    amLODIPine (NORVASC) tablet 5 mg  5 mg Oral Daily    aspirin chewable tablet 81 mg  81 mg Oral Daily    atorvastatin (LIPITOR) tablet 40 mg  40 mg Oral Daily With Dinner    busPIRone (BUSPAR) tablet 20 mg  20 mg Oral TID    Diclofenac Sodium (VOLTAREN) 1 % topical gel 2 g  2 g Topical 4x Daily    DULoxetine (CYMBALTA) delayed release capsule 60 mg  60 mg Oral Daily    enoxaparin (LOVENOX) subcutaneous injection 40 mg  40 mg Subcutaneous Daily    folic acid (FOLVITE) tablet 1,000 mcg  1,000 mcg Oral Daily    lidocaine (LIDODERM) 5 % patch 1 patch  1 patch Topical Daily    LORazepam (ATIVAN) tablet 0 5 mg  0 5 mg Oral Q6H PRN    ondansetron (ZOFRAN-ODT) dispersible tablet 4 mg  4 mg Oral Q6H PRN    pantoprazole (PROTONIX) EC tablet 20 mg  20 mg Oral Early Morning    potassium chloride oral solution 20 mEq  20 mEq Oral Daily    prazosin (MINIPRESS) capsule 2 mg  2 mg Oral Daily    pregabalin (LYRICA) capsule 100 mg  100 mg Oral BID    QUEtiapine (SEROquel) tablet 100 mg  100 mg Oral HS    senna-docusate sodium (SENOKOT S) 8 6-50 mg per tablet 1 tablet  1 tablet Oral Daily PRN    Valbenazine Tosylate CAPS 80 mg  80 mg Oral Daily       VTE Pharmacologic Prophylaxis: Enoxaparin (Lovenox)  VTE Mechanical Prophylaxis: sequential compression device    Portions of the record may have been created with voice recognition software  Occasional wrong word or "sound a like" substitutions may have occurred due to the inherent limitations of voice recognition software    Read the chart carefully and recognize, using context, where substitutions have occurred   ==  Gil Lucas, [address] [facility]  Internal Medicine Residency PGY-1

## 2021-07-15 NOTE — CASE MANAGEMENT
IMM reviewed with patient's caregiver  patient's caregiver agree with discharge determination  CM will file in Medical Records on Pt's behalf
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
## 2021-07-15 NOTE — PLAN OF CARE
Problem: Alteration in Thoughts and Perception  Goal: Treatment Goal: Gain control of psychotic behaviors/thinking, reduce/eliminate presenting symptoms and demonstrate improved reality functioning upon discharge  Outcome: Not Progressing  Goal: Verbalize thoughts and feelings  Description: Interventions:  - Promote a nonjudgmental and trusting relationship with the patient through active listening and therapeutic communication  - Assess patient's level of functioning, behavior and potential for risk  - Engage patient in 1 on 1 interactions  - Encourage patient to express fears, feelings, frustrations, and discuss symptoms    - [place] patient to reality, help patient recognize reality-based thinking   - Administer medications as ordered and assess for potential side effects  - Provide the patient education related to the signs and symptoms of the illness and desired effects of prescribed medications  Outcome: Not Progressing  Goal: Refrain from acting on delusional thinking/internal stimuli  Description: Interventions:  - Monitor patient closely, per order   - Utilize least restrictive measures   - Set reasonable limits, give positive feedback for acceptable   - Administer medications as ordered and monitor of potential side effects  Outcome: Not Progressing  Goal: Agree to be compliant with medication regime, as prescribed and report medication side effects  Description: Interventions:  - Offer appropriate PRN medication and supervise ingestion; conduct AIMS, as needed   Outcome: Not Progressing  Goal: Attend and participate in unit activities, including therapeutic, recreational, and educational groups  Description: Interventions:  -Encourage Visitation and family involvement in care  Outcome: Not Progressing  Goal: Recognize dysfunctional thoughts, communicate reality-based thoughts at the time of discharge  Description: Interventions:  - Provide medication and psycho-education to assist patient in compliance and developing insight into his/her illness   Outcome: Not Progressing  Goal: Complete daily ADLs, including personal hygiene independently, as able  Description: Interventions:  - Observe, teach, and assist patient with ADLS  - Monitor and promote a balance of rest/activity, with adequate nutrition and elimination   Outcome: Not Progressing     Problem: Ineffective Coping  Goal: Identifies ineffective coping skills  Outcome: Not Progressing  Goal: Identifies healthy coping skills  Outcome: Not Progressing  Goal: Demonstrates healthy coping skills  Outcome: Not Progressing  Goal: Patient/Family participate in treatment and DC plans  Description: Interventions:  - Provide therapeutic environment  Outcome: Not Progressing  Goal: Patient/Family verbalizes awareness of resources  Outcome: Not Progressing     Problem: Anxiety  Goal: Anxiety is at manageable level  Description: Interventions:  - Assess and monitor patient's anxiety level  - Monitor for signs and symptoms (heart palpitations, chest pain, shortness of breath, headaches, nausea, feeling jumpy, restlessness, irritable, apprehensive)  - Collaborate with interdisciplinary team and initiate plan and interventions as ordered  - [place] patient to unit/surroundings  - Explain treatment plan  - Encourage participation in care  - Encourage verbalization of concerns/fears  - Identify coping mechanisms  - Assist in developing anxiety-reducing skills  - Administer/offer alternative therapies  - Limit or eliminate stimulants  Outcome: Not Progressing     Problem: BEHAVIOR  Goal: Pt/Family maintain appropriate behavior and adhere to behavioral management agreement, if implemented  Description: INTERVENTIONS:  - Assess the family dynamic   - Encourage verbalization of thoughts and concerns in a socially appropriate manner  - Assess patient/family's coping skills and non-compliant behavior (including use of illegal substances)    - Utilize positive, consistent limit setting strategies supporting safety of patient, staff and others  - Initiate consult with Case Management, Spiritual Care or other ancillary services as appropriate  - If a patient's/visitor's behavior jeopardizes the safety of the patient, staff, or others, refer to organization procedure     - Notify Security of behavior or suspected illegal substances which indicate the need for search of the patient and/or belongings  - Encourage participation in the decision making process about a behavioral management agreement; implement if patient meets criteria  Outcome: Not Progressing     Problem: ANXIETY  Goal: Will report anxiety at manageable levels  Description: INTERVENTIONS:  - Administer medication as ordered  - Teach and encourage coping skills  - Provide emotional support  - Assess patient/family for anxiety and ability to cope  Outcome: Not Progressing  Goal: By discharge: Patient will verbalize 2 strategies to deal with anxiety  Description: Interventions:  - Identify any obvious source/trigger to anxiety  - Staff will assist patient in applying identified coping technique/skills  - Encourage attendance of scheduled groups and activities  Outcome: Not Progressing     Problem: PAIN - ADULT  Goal: Verbalizes/displays adequate comfort level or baseline comfort level  Description: Interventions:  - Encourage patient to monitor pain and request assistance  - Assess pain using appropriate pain scale  - Administer analgesics based on type and severity of pain and evaluate response  - Implement non-pharmacological measures as appropriate and evaluate response  - Consider cultural and social influences on pain and pain management  - Notify physician/advanced practitioner if interventions unsuccessful or patient reports new pain  Outcome: Not Progressing

## 2021-07-15 NOTE — UTILIZATION REVIEW
Continued Stay Review    Date: 07-15-21                          Current Patient Class: observation   Current Level of Care: medical    HPI:55 y o  female initially admitted on *07-13-21    Assessment/Plan: psych eval patient signed 12  CM notified for bed search patient is medically stable once bed is found      Vital Signs:   Date/Time  Temp  Pulse  Resp  BP  MAP (mmHg)  SpO2  O2 Device  Patient Position - Orthostatic VS   07/15/21 0700  98 4 °F (36 9 °C)  79  15  133/80  95  100 %  None (Room air)  Sitting   07/14/21 2254  98 4 °F (36 9 °C)  75  16  115/64  --  97 %  None (Room air)  Lying   07/14/21 1500  98 5 °F (36 9 °C)  72  16  109/55  75  92 %  None (Room air)  Lying   07/14/21 0917  --  --  --  128/90  --  --  --  --   07/14/21 0700  98 1 °F (36 7 °C)  77  16  116/63  83  98 %  None (Room air)  Lying         Pertinent Labs/Diagnostic Results:   Results from last 7 days   Lab Units 07/13/21  1645   SARS-COV-2  Negative     Results from last 7 days   Lab Units 07/15/21  0943 07/14/21  1102 07/13/21  1753 07/11/21  1710   WBC Thousand/uL 3 08* 3 88*  --  6 54   HEMOGLOBIN g/dL 14 3 14 1  --  14 0   HEMATOCRIT % 42 9 42 4  --  41 4   PLATELETS Thousands/uL 348 371 348 402*   NEUTROS ABS Thousands/µL 1 99  --   --  5 24         Results from last 7 days   Lab Units 07/15/21  0636 07/14/21  1102 07/13/21  1752 07/12/21  1123 07/11/21  1710   SODIUM mmol/L 139 137 141 138 140   POTASSIUM mmol/L 3 9 4 1 3 4* 3 3* 3 2*   CHLORIDE mmol/L 113* 111* 110* 108 110*   CO2 mmol/L 20* 20* 22 21 18*   ANION GAP mmol/L 6 6 9 9 12   BUN mg/dL 6 8 7 5 7   CREATININE mg/dL 0 60 0 80 0 63 0 74 0 85   EGFR ml/min/1 73sq m 117 95 115 104 88   CALCIUM mg/dL 8 6 9 0 8 9 9 0 9 1   MAGNESIUM mg/dL  --   --  1 8 1 9  --      Results from last 7 days   Lab Units 07/11/21  1710   AST U/L 12   ALT U/L 18   ALK PHOS U/L 104   TOTAL PROTEIN g/dL 7 8   ALBUMIN g/dL 3 7   TOTAL BILIRUBIN mg/dL 0 67         Results from last 7 days   Lab Units 07/15/21  0636 07/14/21  1102 07/13/21  1752 07/11/21  1710   GLUCOSE RANDOM mg/dL 101 109 104 146*     Results from last 7 days   Lab Units 07/11/21  1710   LIPASE u/L 80         Medications:   Scheduled Medications:  acetaminophen, 975 mg, Oral, Q8H KESHIA  amLODIPine, 5 mg, Oral, Daily  aspirin, 81 mg, Oral, Daily  atorvastatin, 40 mg, Oral, Daily With Dinner  busPIRone, 20 mg, Oral, TID  Diclofenac Sodium, 2 g, Topical, 4x Daily  DULoxetine, 60 mg, Oral, Daily  enoxaparin, 40 mg, Subcutaneous, Daily  folic acid, 0,042 mcg, Oral, Daily  lidocaine, 1 patch, Topical, Daily  morphine, 15 mg, Oral, Q12H KESHIA  pantoprazole, 20 mg, Oral, Early Morning  potassium chloride, 20 mEq, Oral, Daily  prazosin, 2 mg, Oral, Daily  pregabalin, 100 mg, Oral, BID  QUEtiapine, 100 mg, Oral, HS  Valbenazine Tosylate, 80 mg, Oral, Daily      Continuous IV Infusions:     PRN Meds:  LORazepam, 0 5 mg, Oral, Q6H PRN  ondansetron, 4 mg, Oral, Q6H PRN  senna-docusate sodium, 1 tablet, Oral, Daily PRN        Discharge Plan: D    Network Utilization Review Department  ATTENTION: Please call with any questions or concerns to [phone] and carefully listen to the prompts so that you are directed to the right person  All voicemails are confidential   Eugenia Galeano all requests for admission clinical reviews, approved or denied determinations and any other requests to dedicated fax number below belonging to the campus where the patient is receiving treatment   List of dedicated fax numbers for the Facilities:  [address] [place] [address] DENIALS (Administrative/Medical Necessity) [phone]   [address] [place] [address] (Maternity/NICU/Pediatrics) [address] [facility] [address] [facility] Sylvia Barton 1616 [id number] [facility] [place] [address] Lorenza Baker 1481 [phone] [phone]

## 2021-07-15 NOTE — CASE MANAGEMENT
CM submitted pre-cert for Pt's insurance for IP Psych placement at [facility]  CM confirmed there is a bed available  CM faxed completed form to [facility] at ([phone]) including clinical information as well  CM will wait for a confirmation from [facility]  CM also called SHAKILA Ramirez to complete a coordination of benefits  All complete  CM let Salgado Good in Crisis  CM will arrange a transport pickup for later tonight  CM will continue to follow the Pt until DC tonight

## 2021-07-15 NOTE — DISCHARGE SUMMARY
INTERNAL MEDICINE RESIDENCY DISCHARGE SUMMARY     Samantha Chapman   60 y o  female  MRN: [id number]  Room/Bed: [facility] 307/[facility] 307-01     [address] [place] [address]   Encounter: [id number]    Principal Problem:    Panic disorder without agoraphobia  Active Problems:    Chronic bilateral low back pain with bilateral sciatica    Chronic pain syndrome    Bipolar II disorder ([facility])    Hypokalemia    Opioid dependence ([facility] )    Post traumatic stress disorder    Tardive dyskinesia    Nausea    History of CVA (cerebrovascular accident)      History of CVA (cerebrovascular accident)  Assessment & Plan  A:  Reported CVA in 2010    P:  Aspirin 81 mg daily  Lovastatin 40 mg daily      Nausea  Assessment & Plan  Nausea likely secondary to panic disorder or opiate withdrawal    Zofran 4 mg q 6h p r n  Tardive dyskinesia  Assessment & Plan  Valbenazine 80 mg as per home regimen    Post traumatic stress disorder  Assessment & Plan  Prazosin 2 mg PTSD    Opioid dependence ([facility])  Assessment & Plan  A:   COWS Score 4  History of opioid dependence for chronic pain  Per PDMP, last refill 06/25/2021  She was admitted to the hospital 06/27/2021 and was discharged on 07/07/2021  Due to stating they still have pain medication at home, she was not discharged on any new scripts after her last visit  The patient currently reports running out of her home medications, expected refill date 07/19/2021 per Dr Yfn Moore  P:  Monitor for signs of withdrawal  Treat for withdrawal accordingly as necessary  Not on home opioids  Senokot 8 6-50 mg p r n  for chronic constipation secondary to opioid use  Hypokalemia  Assessment & Plan  A: Potassium 3 4 on admission, 4 1 today       P:   Potassium chloride 20 mEq capsule daily starting tomorrow  Continue to monitor with daily BMP checks  Replete as needed    Bipolar II disorder ([facility] )  Assessment & Plan  History of bipolar 2 disorder controlled on home medications    Quetiapine 100 mg daily at bedtime    Chronic pain syndrome  Assessment & Plan  A:  Chronic pain uncontrolled     P:  Voltaren gel 1% topical 2g, 4 times a day  Duloxetine 60 mg  Pregabalin 100 mg b i d  Tylenol 381 mg q 8  Added Licodaine patch     Chronic bilateral low back pain with bilateral sciatica  Assessment & Plan  A:  History of lumbar stenosis  Status post spinal cord stimulator    P:  See chronic pain syndrome    * Panic disorder without agoraphobia  Assessment & Plan  A:   Presented with symptoms of panic attack:  Chest pain, nausea, sweating, numbness in the hands and feet, shortness of breath  Known history    P:  Ativan 0 5 mg q 6h p r n  Buspar 20 mg t i d   Monitor for symptoms    201 signed, patient will be transferred to inpatient psych facility        [address] is a 60 y o  female with a past medical history significant for bipolar 2 disorder, panic attacks, depression and chronic back pain  She presented to the ED via EMS due to panic attacks  She was recently discharged from [facility] on 7/07 and was previously admitted for encephalopathy likely due to polypharmacy  She reports she has been having panic attacks everyday and worsening for the past month  During the panic attacks, she endorses that she has trouble breathing, has chest pain, numbness in hands and feet, as well as hyperventilation and palpitations  With the attacks, she also reports feeling shaky, sweaty and was unable to talk  She reports that she saw her psychiatrist recently, sees him on a monthly basis, and she's managed on Ativan   5mg every 6hrs as needed, as well as Buspar, which was just increased from 15mg TID to 20mg TID by her psychiatrist  [facility] and confirmed the patient's home medications as below      In addition, she has chronic back pain s/p surgery in 2019 to place spinal cord stimulator   She doesn't remember the last time that she took morphine      In the ED, she was given Acetaminophen 975mg, Lorazepam 1mg x2, as well as Morphine 6mg injection x2  After that, she reports feeling better  Patient was admited for further management of panic attacks and intractable back pain  Patient continued to have panic attacks throughout admission, some requiring Ativan p r n  and other episodes being consolable by talking with her  Patient mentioned that she does not feel safe going home and if discharged home she would return back to the ED with her next panic attack  She says these panic attacks impede on her quality of life  Psych was consulted and 201 was signed with the patient and will now be transferred to [facility] for inpatient psych management  Morphine was restarted for patient for pain management although she is not officially due until 7/19 per outpatient  Home meds confirmed by pharmacy:  Buspar 20 mg t i d   Lorazepam 0 5 mg q 6 hours  Potassium chloride 20 mEq daily  Pantoprazole 20 mg daily  Atorvastatin 40 mg daily  Seroquel 100 mg daily  Pregabalin 100 mg b i d   Voltaren gel 2 g, 4 times a day  Morphine sulfate 15 mg b i d    Amlodipine 5 mg daily  Hydroxyzine was canceled but patient picked up 50 mg q d  before the cancellation    Per PDMP:  Morphine 15mg BID  Oxycodone 7 5mg     DISCHARGE INFORMATION     PCP at Discharge: Dr Jayce Torres    Admitting Provider: Silvina Otero DO  Admission Date: 7/13/2021    Discharge Provider: Silvina Otero DO  Discharge Date: 7/15/2021    Discharge Disposition: Home/Self Care  Discharge Condition: fair  Discharge with Lines: no    Discharge Diet: regular diet  Activity Restrictions: none  Test Results Pending at Discharge:  None    Discharge Diagnoses:  Principal Problem:    Panic disorder without agoraphobia  Active Problems:    Chronic bilateral low back pain with bilateral sciatica    Chronic pain syndrome    Bipolar II disorder (HCC)    Hypokalemia    Opioid dependence [facility])    Post traumatic stress disorder    Tardive dyskinesia    Nausea    History of CVA (cerebrovascular accident)  Resolved Problems:    * No resolved hospital problems  *      Consulting Providers:      Diagnostic & Therapeutic Procedures Performed:  No results found  Code Status: Level 1 - Full Code  Advance Directive & Living Will: <no information>  Power of :    POLST:      Medications:  Current Discharge Medication List      STOP taking these medications       LORazepam (ATIVAN) 0 5 mg tablet Comments:   Reason for Stopping:         ondansetron (ZOFRAN-ODT) 4 mg disintegrating tablet Comments:   Reason for Stopping:         senna-docusate sodium (SENOKOT S) 8 6-50 mg per tablet Comments:   Reason for Stopping:             Current Discharge Medication List        Current Discharge Medication List      CONTINUE these medications which have NOT CHANGED    Details   acetaminophen (TYLENOL) 325 mg tablet Take 2 tablets (650 mg total) by mouth every 8 (eight) hours as needed for mild pain  Qty: 60 tablet, Refills: 2    Associated Diagnoses: Ambulatory dysfunction; Lumbar radiculopathy      amLODIPine (NORVASC) 5 mg tablet Take 1 tablet (5 mg total) by mouth daily  Qty: 90 tablet, Refills: 3    Associated Diagnoses: Hypertension, unspecified type      aspirin 81 mg chewable tablet Chew 1 tablet (81 mg total) daily  Qty: 30 tablet, Refills: 0    Associated Diagnoses: Altered mental status      atorvastatin (LIPITOR) 40 mg tablet Take 1 tablet (40 mg total) by mouth daily with dinner  Qty: 30 tablet, Refills: 0    Associated Diagnoses:  Altered mental status      busPIRone (BUSPAR) 15 mg tablet Take 1 tablet (15 mg total) by mouth 3 (three) times a day  Qty: 90 tablet, Refills: 0    Associated Diagnoses: Bipolar II disorder (HCC)      [facility] Vitamin C 500 MG tablet TAKE 1 TABLET BY MOUTH TWICE A DAY  Qty: 60 tablet, Refills: 0    Associated Diagnoses: Primary osteoarthritis of right knee; Preop testing Diapers & Supplies (Huggies Pull-Ups) MISC Use 3 (three) times a day  Qty: 100 each, Refills: 3    Associated Diagnoses: Urge incontinence of urine      Diclofenac Sodium (VOLTAREN) 1 % Apply 2 g topically 4 (four) times a day  Qty: 150 g, Refills: 0    Associated Diagnoses: Bilateral chronic knee pain      DULoxetine (CYMBALTA) 60 mg delayed release capsule TAKE 1 CAPSULE BY MOUTH EVERY DAY  Qty: 30 capsule, Refills: 0    Associated Diagnoses: Generalized anxiety disorder      ferrous sulfate 324 (65 Fe) mg TAKE 1 TABLET (324 MG TOTAL) BY MOUTH 2 (TWO) TIMES A DAY BEFORE MEALS  Qty: 60 tablet, Refills: 1    Associated Diagnoses: Primary osteoarthritis of right knee; Preop testing      folic acid (FOLVITE) 1 mg tablet TAKE 1 TABLET BY MOUTH EVERY DAY  Qty: 30 tablet, Refills: 1    Associated Diagnoses: Primary osteoarthritis of right knee; Preop testing      naloxone (NARCAN) 4 mg/0 1 mL nasal spray Administer 1 spray into a nostril  If no response after 2-3 minutes, give another dose in the other nostril using a new spray  Qty: 1 each, Refills: 1    Associated Diagnoses: Lumbar radiculopathy; Chronic pain disorder      pantoprazole (PROTONIX) 20 mg tablet Take 1 tablet (20 mg total) by mouth daily in the early morning  Qty: 30 tablet, Refills: 0    Associated Diagnoses: Nausea      potassium chloride (MICRO-K) 10 MEQ CR capsule Take 2 capsules (20 mEq total) by mouth daily Take 2 tablets (20 meq total) daily  Qty: 60 capsule, Refills: 0    Associated Diagnoses: Hypokalemia      prazosin (MINIPRESS) 2 mg capsule TAKE 1 CAPSULE BY MOUTH EVERY DAY  Qty: 30 capsule, Refills: 1    Associated Diagnoses: Hypertension, unspecified type      pregabalin (LYRICA) 100 mg capsule Take 1 capsule (100 mg total) by mouth 2 (two) times a day  Qty: 60 capsule, Refills: 1    Associated Diagnoses: Lumbar radiculopathy; Chronic pain disorder; Chronic pain syndrome; Fibromyalgia;  Chronic bilateral low back pain with bilateral sciatica      QUEtiapine (SEROquel) 100 mg tablet Take 1 tablet (100 mg total) by mouth daily at bedtime  Qty: 30 tablet, Refills: 0    Associated Diagnoses: Bipolar II disorder (HCC)      Respiratory Therapy Supplies (Nebulizer) YUDY Use as needed (Shortness of breath)  Qty: 1 each, Refills: 0    Associated Diagnoses: Dyspnea      Valbenazine Tosylate (Ingrezza) 80 MG CAPS Take 80 mg by mouth daily  Qty: 30 capsule, Refills: 1    Associated Diagnoses: Tardive dyskinesia             Allergies:  No Known Allergies    FOLLOW-UP     PCP Outpatient Follow-up:  yes      Follow up:  TCM visit as necessary  Date and time: To be scheduled  Location: PCP  Follow up within next:  1-2 weeks after discharge home    Consulting Providers Follow-up:  Outpatient psych     Active Issues Requiring Follow-up:   none    Discharge Statement:   I spent 45 minutes minutes discharging the patient  This time was spent on the day of discharge  I had direct contact with the patient on the day of discharge  Additional documentation is required if more than 30 minutes were spent on discharge  Portions of the record may have been created with voice recognition software  Occasional wrong word or "sound a like" substitutions may have occurred due to the inherent limitations of voice recognition software    Read the chart carefully and recognize, using context, where substitutions have occurred     ==  Concepcion Gilford, [facility]  Internal Medicine Resident PGY-1

## 2021-07-16 ENCOUNTER — PATIENT OUTREACH (OUTPATIENT)
Dept: INTERNAL MEDICINE CLINIC | Facility: CLINIC | Age: 56
End: 2021-07-16

## 2021-07-16 ENCOUNTER — APPOINTMENT (INPATIENT)
Dept: RADIOLOGY | Facility: HOSPITAL | Age: 56
DRG: 885 | End: 2021-07-16
Payer: COMMERCIAL

## 2021-07-16 ENCOUNTER — TRANSITIONAL CARE MANAGEMENT (OUTPATIENT)
Dept: INTERNAL MEDICINE CLINIC | Facility: CLINIC | Age: 56
End: 2021-07-16

## 2021-07-16 PROBLEM — S92.301A MULTIPLE CLOSED FRACTURES OF METATARSAL BONE OF RIGHT FOOT: Status: ACTIVE | Noted: 2021-05-02

## 2021-07-16 PROBLEM — Z00.8 MEDICAL CLEARANCE FOR PSYCHIATRIC ADMISSION: Status: ACTIVE | Noted: 2021-07-16

## 2021-07-16 PROCEDURE — 97163 PT EVAL HIGH COMPLEX 45 MIN: CPT

## 2021-07-16 PROCEDURE — 99253 IP/OBS CNSLTJ NEW/EST LOW 45: CPT | Performed by: PHYSICIAN ASSISTANT

## 2021-07-16 PROCEDURE — 99252 IP/OBS CONSLTJ NEW/EST SF 35: CPT | Performed by: PHYSICIAN ASSISTANT

## 2021-07-16 PROCEDURE — 73630 X-RAY EXAM OF FOOT: CPT

## 2021-07-16 PROCEDURE — TCMNV: Performed by: INTERNAL MEDICINE

## 2021-07-16 RX ORDER — PAROXETINE HYDROCHLORIDE 20 MG/1
10 TABLET, FILM COATED ORAL DAILY
Status: DISCONTINUED | OUTPATIENT
Start: 2021-07-16 | End: 2021-07-16

## 2021-07-16 RX ORDER — PAROXETINE HYDROCHLORIDE 20 MG/1
20 TABLET, FILM COATED ORAL DAILY
Status: DISCONTINUED | OUTPATIENT
Start: 2021-07-16 | End: 2021-07-19

## 2021-07-16 RX ORDER — QUETIAPINE FUMARATE 25 MG/1
25 TABLET, FILM COATED ORAL 3 TIMES DAILY
Status: DISCONTINUED | OUTPATIENT
Start: 2021-07-16 | End: 2021-07-20

## 2021-07-16 RX ADMIN — FOLIC ACID 1000 MCG: 1 TABLET ORAL at 08:29

## 2021-07-16 RX ADMIN — ATORVASTATIN CALCIUM 40 MG: 40 TABLET, FILM COATED ORAL at 17:29

## 2021-07-16 RX ADMIN — POTASSIUM CHLORIDE 20 MEQ: 20 SOLUTION ORAL at 08:36

## 2021-07-16 RX ADMIN — PHENYTOIN 2 MG: 125 SUSPENSION ORAL at 08:28

## 2021-07-16 RX ADMIN — LIDOCAINE 1 PATCH: 50 PATCH TOPICAL at 08:30

## 2021-07-16 RX ADMIN — QUETIAPINE FUMARATE 25 MG: 25 TABLET ORAL at 11:05

## 2021-07-16 RX ADMIN — ENOXAPARIN SODIUM 40 MG: 40 INJECTION SUBCUTANEOUS at 08:30

## 2021-07-16 RX ADMIN — BUSPIRONE HYDROCHLORIDE 20 MG: 10 TABLET ORAL at 21:07

## 2021-07-16 RX ADMIN — PAROXETINE HYDROCHLORIDE 20 MG: 20 TABLET, FILM COATED ORAL at 11:05

## 2021-07-16 RX ADMIN — DULOXETINE 60 MG: 60 CAPSULE, DELAYED RELEASE ORAL at 08:28

## 2021-07-16 RX ADMIN — ASPIRIN 81 MG CHEWABLE TABLET 81 MG: 81 TABLET CHEWABLE at 08:29

## 2021-07-16 RX ADMIN — PREGABALIN 100 MG: 50 CAPSULE ORAL at 17:29

## 2021-07-16 RX ADMIN — DICLOFENAC SODIUM 2 G: 10 GEL TOPICAL at 09:06

## 2021-07-16 RX ADMIN — ACETAMINOPHEN 975 MG: 325 TABLET ORAL at 21:09

## 2021-07-16 RX ADMIN — QUETIAPINE FUMARATE 100 MG: 100 TABLET ORAL at 21:09

## 2021-07-16 RX ADMIN — QUETIAPINE FUMARATE 25 MG: 25 TABLET ORAL at 17:29

## 2021-07-16 RX ADMIN — AMLODIPINE BESYLATE 5 MG: 5 TABLET ORAL at 08:29

## 2021-07-16 RX ADMIN — MORPHINE SULFATE 15 MG: 15 TABLET, EXTENDED RELEASE ORAL at 08:29

## 2021-07-16 RX ADMIN — LORAZEPAM 0.5 MG: 0.5 TABLET ORAL at 09:04

## 2021-07-16 RX ADMIN — PREGABALIN 100 MG: 50 CAPSULE ORAL at 08:28

## 2021-07-16 RX ADMIN — BUSPIRONE HYDROCHLORIDE 20 MG: 10 TABLET ORAL at 08:30

## 2021-07-16 RX ADMIN — PANTOPRAZOLE SODIUM 20 MG: 20 TABLET, DELAYED RELEASE ORAL at 06:07

## 2021-07-16 RX ADMIN — MORPHINE SULFATE 15 MG: 15 TABLET, EXTENDED RELEASE ORAL at 21:07

## 2021-07-16 RX ADMIN — ACETAMINOPHEN 975 MG: 325 TABLET ORAL at 06:04

## 2021-07-16 RX ADMIN — DICLOFENAC SODIUM 2 G: 10 GEL TOPICAL at 21:12

## 2021-07-16 RX ADMIN — BUSPIRONE HYDROCHLORIDE 20 MG: 10 TABLET ORAL at 17:28

## 2021-07-16 RX ADMIN — QUETIAPINE FUMARATE 25 MG: 25 TABLET ORAL at 21:09

## 2021-07-16 NOTE — QUICK NOTE
Attempted to see patient for medical clearance overnight tonight, however unable to complete at this time as patient sleeping  Thorough chart review performed, medications reviewed  Most recent vitals reviewed and stable

## 2021-07-16 NOTE — NURSING NOTE
States she slept well  Medication compliant  Rates back pain as 7/10 for scheduled tylenol   Rates depression 5/10 and anxiety 4/4  Denies SI/HI

## 2021-07-16 NOTE — CASE MANAGEMENT
Case Management Progress Note    Patient name Broderick Carrasco  Location [facility] 656/[facility] 448-52 MRN [id number]  : 1965 Date 2021       LOS (days): 1  Geometric Mean LOS (GMLOS) (days):   Days to GMLOS:        BUNDLE:      OBJECTIVE:  Pt is a 60y o  year old , black or  [2], female with Buddhist preference of None admitted on 7/15/2021  6:16 PM  Pt [facility] at [address] [place] [address] with complaints of Bipolar II disorder ([facility] )  Current admission status: Inpatient Psych  Preferred Pharmacy:   [facility]/pharmacy #[id number] Drummond - [address] [facility]  [address] [address]  Phone: [phone] Fax: [phone]    Primary Care Provider: Benny Mendez MD    PROGRESS NOTE:       called patients son Venkat Rodrigues and left message on his phone stating if he can not get his mother's Rachel Metzger to [address] [place] [address] to please call the nurses station phone number and let the nurses know   did leave the nurses station phone number in message  ( [phone])    Mick's phone number : [phone]
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
## 2021-07-16 NOTE — PROGRESS NOTES
07/16/21 1220   Team Meeting   Meeting Type Tx Team Meeting   Initial Conference Date 07/16/21   Next Conference Date 08/16/21   Team Members Present   Team Members Present Physician;Nurse;   Patient/Family Present   Patient Present Yes   Patient's Family Present No   Dr Gabriella Jean LPN, T   Savrandallol

## 2021-07-16 NOTE — CONSULTS
[id number], 60 y o  female MRN: [id number]  Unit/Bed#: [facility] [place] 317-16 Encounter: [id number]  Primary Care Provider: María White MD   Date and time admitted to hospital: 7/15/2021  6:16 PM    Inpatient consult for Medical Clearance for [facility] patient  Consult performed by: Mary Ellen Wallace PA-C  Consult ordered by: MERCY Wright        Medical clearance for psychiatric admission  Assessment & Plan  Patient seen and examined today, medically clear for admission to [facility]    Panic disorder without agoraphobia  Assessment & Plan  · Patient originally presented to [facility] ED on 07/13/2021 for evaluation of panic attack, subsequently admitted  · Currently 201 status  · Management per Psychiatry    Opioid dependence ([facility] [address] )  Assessment & Plan  · Patient with chronic opioid use-prescribed for management of chronic pain  · Oxycodone 5 mg and morphine 15 mg b i d  · Per PCP visit 07/12/2021-new script for meds to be written on 07/19/2021 (10-day supplies in future)  · Patient previously admitted from 06/27-7/7 for encephalopathy that was deemed to be secondary to polypharmacy  · Per PCP note- reason for strict opioid monitoring given recent admission for encephalopathy due to polypharmacy  · Morphine restarted at [facility] prior to transfer to [facility]  · Recommend f/u with PCP for ongoing monitoring/maintenance    Mixed hyperlipidemia  Assessment & Plan  · Most recent lipid panel 02/13/2020:  Cholesterol 215, triglycerides 74, HDL 84,     · Continue atorvastatin 40 mg daily  · Recommend follow-up with PCP outpatient for ongoing monitoring/maintenance    History of CVA (cerebrovascular accident)  Assessment & Plan  · Per chart review- h/o CVA 2010  · Continue ASA 81 mg and statin daily     Multiple closed fractures of metatarsal bone of right foot  Assessment & Plan  · Pt with h/o recent metatarsal fractures of right foot  · Patient reports that she hit her right foot on her walker today, notes worsening pain of right foot   · Sharp 7/10 pain with weight-bearing/ambulation; denies pain at rest  · Patient also notes that she was recently admitted for AMS/encephalopathy (6/27-7/7/21) and states she is unsure if she re-injured her right foot during that time   · Per ortho note 6/14/2021 Marquis Stewart)- patient has fracture of right 2nd through 4th metatarsal bases on originally on 05/01/2021; fractures noted to be healing at this visit  · Plan at this visit was as follows:  · Weightbearing as tolerated right lower extremity and postoperative shoe  Return to normal she was tolerating full weight without pain and postop shoe      · X-ray right foot 05/01/2021:  Nondisplaced fractures 2nd and 4th metatarsal, possibly involving base of 3rd metatarsal  · X-ray right foot 06/14/2021:  Fractures of the bases of 2nd through 4th metatarsals without significant interval change in alignment  · Order XR right foot to assess for acute osseous abnormalities/changes     Primary osteoarthritis of both knees  Assessment & Plan  · Patient follows with [facility]  · Last visit 06/14/2021, received corticosteroid injection  · Continue Voltaren gel, pain management  · Recommend follow-up with Ortho outpatient    Tardive dyskinesia  Assessment & Plan  · Continue outpatient regimen:  Valbenazine 80 mg daily     Post traumatic stress disorder  Assessment & Plan  · Management per Psychiatry    History of hypokalemia  Assessment & Plan  · During recent admission to [facility], patient was started on daily supplementation of potassium (20 mEq)  · Potassium 3 4 on admission  · Review of recent potassium levels indicates patient has intermittently experience low potassium 2 8-3 3 within the past year  · BMP 07/15/2021:  Potassium 3 9 (stable)  · Recheck BMP in AM to ensure potassium has not decreased further; replete with additional potassium if indicated  · Consider additional monitoring of BMP if potassium decreases  · Continue daily supplementation  · Encourage follow-up with PCP outpatient for ongoing monitoring/maintenance    Essential hypertension  Assessment & Plan  · Most recent /78, preceding range 122//76  · Continue amlodipine 5 mg daily  · Continue to monitor vitals, BP    Chronic pain syndrome  Assessment & Plan  · Patient with history chronic pain  · Also with history of opioid dependence secondary to chronic pain  · Continue with treatment as follows (per treatment from recent admission to [facility])  · Apply Voltaren gel, Lidoderm patch  · Continue with Cymbalta 60 mg daily, Lyrica 100 mg b i d , Tylenol 975 mg q 8h, morphine 15 mg b i d  Chronic bilateral low back pain with bilateral sciatica  Assessment & Plan  · Patient with history lumbar stenosis, s/p spinal cord stimulator (7/28/2020)  · Manage pain as detailed below    * Bipolar II disorder [facility])  Assessment & Plan  · Management per Psychiatry    ECT Clearance:   History of recent seizure or stroke:  No; stroke 2010; recent encephalopathy June 2021 deemed to be 2/2 polypharm (stroke and seizure ruled out)   History of pheochromocytoma:  no   History of active bleeding (Intracranial hemorrhage, aneurysm or AVM):  no   History of metallic implants in the head or neck: no   History of increased intracranial pressure with mass effect:  no   Does the patient have a current arrhythmia? EKG 07/13/2021: "Normal sinus rhythm, rightward axis  T-wave abnormality, consider inferior ischemia  Abnormal EKG  Compared to EKG 07/11/2021-inverted T-waves replaced nonspecific T-wave abnormality in inferior leads  It is nonspecific T-wave abnormality now evident in anterior leads " (Confirmed by Liberty Pope)     Based on above criteria, Patient is medically cleared for ECT should it be recommended  Counseling / Coordination of Care Time: 60 minutes    Greater than 50% of total time spent on patient counseling and coordination of care  Collaboration of Care: Were Recommendations Directly Discussed with Primary Treatment Team? - Yes     History of Present Illness:    Samantha Pritchard is a 60 y o  female PMH of chronic back pain with spinal cord stimulator and chronic opioid dependence, primary OA of b/l knees, HTN, HLD, tardive dyskinesia, history of stroke (2010) who is originally admitted to the psychiatry service due to panic attacks  We are consulted for medical clearance for admission to [address] [place] [address] and treatment of underlying psychiatric illness  Patient was transferred from [facility] where she was admitted prior to transfer to [facility]  Patient reports she is currently admitted to the unit to get with constant panic attacks  Patient notes that the current medication regimen has helped greatly with her symptoms today  Patient notes that she typically experiences the following symptoms when she has a panic attack:  Shortness of breath, hyperventilation, lightheadedness/dizziness, worsening of back pain  Patient also reports that dealing with chronic back pain has worsened her depression and anxiety  She notes that her panic attacks greatly affect her life, noting that she is afraid to go anywhere for fear that she will experience a panic attack and no one will note do a help her  During encounter, patient mentions that she hit her right foot on her walker earlier today  She states that she previously fractured her right foot about a month ago  Patient notes worsening of right foot pain today after striking her foot against a walker, stating that she experiences 7/10 sharp pain with weight-bearing/ambulation, denies pain at rest   Patient states that when she had encephalopathy a few weeks ago, she may have injured her foot at the time without remembering  Patient recalls recent admission 6/27/2021-7/7/2021 for altered mental status    Patient states that she believes she was taking her pain medications appropriately prior to this admission, however then states that she may have taken more without realizing it  Patient notes that her oxybutynin for overactive bladder was discontinued during this admission comment denies recent acute worsening of her symptoms  Patient reports that she has a spinal cord stimulator in place for her chronic back pain and also takes chronic opioids  Spinal cord stimulator was initially placed July 2020 by a [facility]  Patient notes that she does not follow with Pain Management, stating that the previous pain management provider that she had seen stated there was nothing he could do to help her in that she would have to live with chronic pain for the rest of her life  Currently, patient states that her primary care provider prescribes for opioids  Chart review reveals that patient recently ran out of her opioid prescriptions sooner than anticipated (just prior to admission for encephalopathy); per PCP note, prescription renewal to 07/19/2021  Morphine was restarted during recent admission [facility] just prior to transport  Review of Systems:    Review of Systems   Constitutional: Positive for appetite change (notes improvement)  Negative for chills and fever  HENT: Negative for congestion, ear pain, rhinorrhea, sneezing and sore throat  Eyes: Negative for pain and visual disturbance  Respiratory: Negative for cough and shortness of breath (SOB with panic attacks)  Cardiovascular: Negative for chest pain and palpitations  Gastrointestinal: Negative for abdominal pain, constipation, diarrhea, nausea and vomiting  Genitourinary: Negative for difficulty urinating, dysuria and hematuria  Musculoskeletal: Positive for arthralgias (right mid-foot pain) and back pain (chronic)  Skin: Negative for color change and rash     Neurological: Positive for weakness (generalized, chronic) and numbness (b/l sciatica, chronic)  Negative for dizziness (positive when experiencing panic attacks ), seizures, syncope, light-headedness and headaches  Psychiatric/Behavioral: The patient is nervous/anxious  All other systems reviewed and are negative  Past Medical and Surgical History:     Past Medical History:   Diagnosis Date    Acid reflux     Anxiety     RESOLVED: [id number]    Arthritis     Bipolar 2 disorder (HCC)     FOLLOWS WITH PSYCHIATRIST  CONTINUE LAMOTRIGINE; RESOLVED: [id number]    Depression     Familial tremor     both hands    Fibromyalgia     LAST ASSESSED: [id number]    Hearing aid worn     left ear    [place] (hard of hearing)     left ear    Hypertension     Left-sided weakness     Lower back pain     Memory loss of unknown cause     long and short term    Migraine     Obesity     Obesity, Class II, BMI 35-39 9     Overactive bladder     Panic attack     Post traumatic stress disorder     Seasonal allergies     Stroke [facility])     questionable stroke 2009    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    CONT ASA 81 MG DAILY; RESOLVED: [id number]    Urinary incontinence     Wears dentures     partial lower / full upper    Wears glasses        Past Surgical History:   Procedure Laterality Date    BACK SURGERY       SECTION      COLONOSCOPY      RESOLVED: [id number]    EAR SURGERY      EGD      HYSTERECTOMY      MYRINGOTOMY W/ TUBES Left     NECK SURGERY  2019    NM CYSTOURETHROSCOPY N/A 2016    Procedure: CYSTOSCOPY, BOTOX INJECTION;  Surgeon: Dann Potter MD;  Location: [facility];  Service: Gynecology    NM IMPLANT SPINAL NEUROSTIM/ Right 2/10/2021    Procedure: REPLACEMENT IMPLANTABLE PULSE GENERATOR DORSAL SPINAL COLUMN STIMULATOR, RIGHT;  Surgeon: Andres Poon MD;  Location: [facility];  Service: Neurosurgery    NM PERCUT IMPLNT NEUROELECT,EPIDURAL Right 2020    Procedure: INSERTION THORACIC DORSAL COLUMN SPINAL CORD STIMULATOR PERCUTANEOUS W IMPLANTABLE PULSE GENERATOR, RIGHT;  Surgeon: Taina Dove MD;  Location:  MAIN OR;  Service: Neurosurgery    [facility] GASTROINTEST[facility]AL [facility]OSCOPY  09/2020       Meds/Allergies:    PTA meds:   Prior to Admission Medications   Prescriptions Last Dose Informant Patient Reported? Taking?    [employer] Vitamin C 500 MG tablet Unknown at Unknown time  No No   Sig: TAKE 1 TABLET BY MOUTH TWICE A DAY   DULoxetine (CYMBALTA) 60 mg delayed release capsule Unknown at Unknown time  No No   Sig: TAKE 1 CAPSULE BY MOUTH EVERY DAY   Diapers & Supplies (Huggies Pull-Ups) MISC Unknown at Unknown time  No No   Sig: Use 3 (three) times a day   Diclofenac Sodium (VOLTAREN) 1 % Unknown at Unknown time  No No   Sig: Apply 2 g topically 4 (four) times a day   QUEtiapine (SEROquel) 100 mg tablet Unknown at Unknown time  No No   Sig: Take 1 tablet (100 mg total) by mouth daily at bedtime   Respiratory Therapy Supplies (Nebulizer) YUDY Unknown at Unknown time  No No   Sig: Use as needed (Shortness of breath)   Patient not taking: Reported on 4/20/2021   Valbenazine Tosylate (Ingrezza) 80 MG CAPS Unknown at Unknown time  No No   Sig: Take 80 mg by mouth daily   acetaminophen (TYLENOL) 325 mg tablet Unknown at Unknown time  No No   Sig: Take 2 tablets (650 mg total) by mouth every 8 (eight) hours as needed for mild pain   amLODIPine (NORVASC) 5 mg tablet Unknown at Unknown time  No No   Sig: Take 1 tablet (5 mg total) by mouth daily   aspirin 81 mg chewable tablet Unknown at Unknown time  No No   Sig: Chew 1 tablet (81 mg total) daily   atorvastatin (LIPITOR) 40 mg tablet Unknown at Unknown time  No No   Sig: Take 1 tablet (40 mg total) by mouth daily with dinner   busPIRone (BUSPAR) 15 mg tablet Unknown at Unknown time  No No   Sig: Take 1 tablet (15 mg total) by mouth 3 (three) times a day   ferrous sulfate 324 (65 Fe) mg Unknown at Unknown time  No No   Sig: TAKE 1 TABLET (324 MG TOTAL) BY MOUTH 2 (TWO) TIMES A DAY BEFORE MEALS   folic acid (FOLVITE) 1 mg tablet Unknown at Unknown time  No No   Sig: TAKE 1 TABLET BY MOUTH EVERY DAY   naloxone (NARCAN) 4 mg/0 1 mL nasal spray Unknown at Unknown time  No No   Sig: Administer 1 spray into a nostril  If no response after 2-3 minutes, give another dose in the other nostril using a new spray  pantoprazole (PROTONIX) 20 mg tablet Unknown at Unknown time  No No   Sig: Take 1 tablet (20 mg total) by mouth daily in the early morning   potassium chloride (MICRO-K) 10 MEQ CR capsule Unknown at Unknown time  No No   Sig: Take 2 capsules (20 mEq total) by mouth daily Take 2 tablets (20 meq total) daily     prazosin (MINIPRESS) 2 mg capsule Unknown at Unknown time Outside Facility (Specify) No No   Sig: TAKE 1 CAPSULE BY MOUTH EVERY DAY   pregabalin (LYRICA) 100 mg capsule Unknown at Unknown time  No No   Sig: Take 1 capsule (100 mg total) by mouth 2 (two) times a day      Facility-Administered Medications: None       Allergies: No Known Allergies    Social History:     Marital Status:     Substance Use History:   Social History     Substance and Sexual Activity   Alcohol Use Not Currently    Comment: 2 x year; being a social drinker as per all scripts      Social History     Tobacco Use   Smoking Status Never Smoker   Smokeless Tobacco Never Used     Social History     Substance and Sexual Activity   Drug Use No       Family History:    Family History   Problem Relation Age of Onset    Colon cancer Mother     Alzheimer's disease Father     Stroke Father     Colon cancer Brother     Bipolar disorder Brother     Breast cancer Maternal Aunt     Colon cancer Maternal Aunt     Heart disease Other     Diabetes Other     Hypertension Other     Seizures Son     Depression Paternal Grandfather     No Known Problems Sister     No Known Problems Brother     Thyroid disease Neg Hx        Physical Exam:     Vitals:   Blood Pressure: 103/76 (07/16/21 2108)  Pulse: 95 (07/16/21 2108)  Temperature: 97 6 °F (36 4 °C) (07/16/21 2108)  Temp Source: Temporal (07/16/21 2108)  Respirations: 18 (07/16/21 2108)  SpO2: 100 % (07/16/21 2108)    Physical Exam  Vitals reviewed  Constitutional:       General: She is not in acute distress  Appearance: Normal appearance  She is normal weight  She is not ill-appearing or diaphoretic  Comments: End of encounter, patient seen ambulating on the unit with walker  HENT:      Head: Normocephalic and atraumatic  Nose: Nose normal  No rhinorrhea  Mouth/Throat:      Mouth: Mucous membranes are moist       Pharynx: Oropharynx is clear  Comments: Frequent lip smacking/tongue movement noted during encounter  Eyes:      General: No scleral icterus  Extraocular Movements: Extraocular movements intact  Conjunctiva/sclera: Conjunctivae normal    Cardiovascular:      Rate and Rhythm: Normal rate and regular rhythm  Pulses: Normal pulses  Dorsalis pedis pulses are 2+ on the right side and 2+ on the left side  Posterior tibial pulses are 2+ on the right side and 2+ on the left side  Heart sounds: Normal heart sounds  No murmur heard  No friction rub  No gallop  Pulmonary:      Effort: Pulmonary effort is normal  No respiratory distress  Breath sounds: No wheezing, rhonchi or rales  Abdominal:      General: Abdomen is flat  Bowel sounds are normal  There is no distension  Palpations: Abdomen is soft  Tenderness: There is no abdominal tenderness  There is no guarding  Musculoskeletal:         General: Tenderness present  No swelling  Cervical back: Normal range of motion  Right lower leg: No edema  Left lower leg: No edema  Right ankle: No tenderness  Decreased range of motion (limited dorsiflexion, patient states chronic 2/2 back pain/sciatica)  Left ankle: No tenderness   Decreased range of motion (limited dorsiflexion, patient states chronic 2/2 back pain/sciatica)  Right foot: Normal range of motion and normal capillary refill  Bony tenderness (TTP metatarsals 2-4 and midfoot) present  Left foot: Normal range of motion and normal capillary refill  No bony tenderness  Feet:    Feet:      Right foot:      Skin integrity: Dry skin present  Toenail Condition: Right toenails are long  Left foot:      Skin integrity: Dry skin present  Toenail Condition: Left toenails are long  Skin:     General: Skin is warm and dry  Capillary Refill: Capillary refill takes less than 2 seconds  Neurological:      General: No focal deficit present  Mental Status: She is alert and oriented to person, place, and time  Mental status is at baseline  Sensory: No sensory deficit  Psychiatric:         Attention and Perception: Attention normal          Mood and Affect: Mood is anxious and depressed  Affect is tearful  Speech: Speech normal          Behavior: Behavior is cooperative  Additional Data:     Lab Results: I have personally reviewed pertinent reports  Results from last 7 days   Lab Units 07/15/21  0943   WBC Thousand/uL 3 08*   HEMOGLOBIN g/dL 14 3   HEMATOCRIT % 42 9   PLATELETS Thousands/uL 348   NEUTROS PCT % 64   LYMPHS PCT % 27   MONOS PCT % 5   EOS PCT % 2     Results from last 7 days   Lab Units 07/15/21  0636 07/12/21  1123 07/11/21  1710   SODIUM mmol/L 139  --  140   POTASSIUM mmol/L 3 9  --  3 2*   CHLORIDE mmol/L 113*  --  110*   CO2 mmol/L 20*  --  18*   BUN mg/dL 6  --  7   CREATININE mg/dL 0 60  --  0 85   ANION GAP mmol/L 6  --  12   CALCIUM mg/dL 8 6  --  9 1   ALBUMIN g/dL  --   --  3 7   TOTAL BILIRUBIN mg/dL  --   --  0 67   ALK PHOS U/L  --   --  104   ALT U/L  --   --  18   AST U/L  --   --  12   GLUCOSE RANDOM mg/dL 101   < > 146*    < > = values in this interval not displayed               Lab Results   Component Value Date/Time    HGBA1C 4 8 02/08/2021 05:56 AM    HGBA1C 4 8 02/08/2021 12:00 AM    HGBA1C 5 0 09/04/2020 09:22 AM    HGBA1C 5 2 07/08/2020 08:41 AM    HGBA1C 4 8 02/13/2020 10:21 AM           EKG, Pathology, and Other Studies Reviewed on Admission:    EKG 07/13/2021: "Normal sinus rhythm, rightward axis  T-wave abnormality, consider inferior ischemia  Abnormal EKG  Compared to EKG 07/11/2021-inverted T-waves replaced nonspecific T-wave abnormality in inferior leads  It is nonspecific T-wave abnormality now evident in anterior leads " (Confirmed by Dalton Sharma)   o QRS 72  o QT//433 ms  · CT head 06/29/2021  · X-ray right foot 06/14/2021  · X-ray thoracolumbar spine 04/06/2021    ** Please Note: This note has been constructed using a voice recognition system   **

## 2021-07-16 NOTE — ASSESSMENT & PLAN NOTE
· Patient with history chronic pain  · Also with history of opioid dependence secondary to chronic pain  · Continue with treatment as follows (per treatment from recent admission to [facility])  · Apply Voltaren gel, Lidoderm patch  · Continue with Cymbalta 60 mg daily, Lyrica 100 mg b i d , Tylenol 975 mg q 8h, morphine 15 mg b i d

## 2021-07-16 NOTE — ASSESSMENT & PLAN NOTE
· Patient originally presented to [facility] ED on 07/13/2021 for evaluation of panic attack, subsequently admitted  · Currently 201 status  · Management per Psychiatry

## 2021-07-16 NOTE — H&P
Initial Psychiatric Evaluation    Medical Record Number: [id number]  Encounter: [id number]      History:     Carolyne Myers is an 60 y o , female, admitted to the psychiatric unit under a 201 status to Dr Mirtha Arroyo' service with the chief complaint of  increased anxiety and panic attacks which are overwhelming to the patient  She has said that she is better off dead although is not entertaining any suicidal thoughts per se  She has been in the emergency room for 5 times prior to this admission for severe panic attacks the  She is treated for depression and anxiety at the [facility]  in [facility] she said she has a psychiatrist there but no therapist     Patient lives  with her son son who has epilepsy and is unable to drive but works     Patient said she has been restricted to her apartment for the last 2 years because of her disabilities of which also includes severe lower back pain  She had the back surgery in 2018 and was told that she will be better in 1-2 years but has not really recovered from this procedure  She uses a walker and she has spent over when she walks and has persistent lower back pain  She takes morphine for the chronic pain  Also has been on Cymbalta for anxiety and depression  And Seroquel 100 mg q h s         Past Medical History:   Diagnosis Date    Acid reflux     Anxiety     RESOLVED: [id number]    Arthritis     Bipolar 2 disorder (HCC)     FOLLOWS WITH PSYCHIATRIST   CONTINUE LAMOTRIGINE; RESOLVED: [id number]    Depression     Familial tremor     both hands    Fibromyalgia     LAST ASSESSED: [id number]    Hearing aid worn     left ear    [place] (hard of hearing)     left ear    Hypertension     Left-sided weakness     Lower back pain     Memory loss of unknown cause     long and short term    Migraine     Obesity     Obesity, Class II, BMI 35-39 9     Overactive bladder     Panic attack     Post traumatic stress disorder     Seasonal allergies     Stroke [facility])     questionable stroke 2009    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    CONT ASA 81 MG DAILY; RESOLVED: [id number]    Urinary incontinence     Wears dentures     partial lower / full upper    Wears glasses        Past surgical history:  Past Surgical History:   Procedure Laterality Date    BACK SURGERY       SECTION  1986    COLONOSCOPY      RESOLVED: [id number]    EAR SURGERY      EGD      HYSTERECTOMY      MYRINGOTOMY W/ TUBES Left     NECK SURGERY  2019    NM CYSTOURETHROSCOPY N/A 2016    Procedure: CYSTOSCOPY, BOTOX INJECTION;  Surgeon: João Cloud MD;  Location: [facility];  Service: Gynecology    NM IMPLANT SPINAL NEUROSTIM/ Right 2/10/2021    Procedure: REPLACEMENT IMPLANTABLE PULSE GENERATOR DORSAL SPINAL COLUMN STIMULATOR, RIGHT;  Surgeon: Marcos Cates MD;  Location: [facility];  Service: Neurosurgery    NM PERCUT IMPLNT Erinn Joyce Right 2020    Procedure: INSERTION THORACIC DORSAL COLUMN SPINAL CORD STIMULATOR PERCUTANEOUS W IMPLANTABLE PULSE GENERATOR, RIGHT;  Surgeon: Marcos Cates MD;  Location: [facility];  Service: Neurosurgery    TONSILLECTOMY      TUBAL LIGATION      UPPER GASTROINTESTINAL ENDOSCOPY  2020       Family history:  Family History   Problem Relation Age of Onset    Colon cancer Mother     Alzheimer's disease Father     Stroke Father     Colon cancer Brother     Bipolar disorder Brother     Breast cancer Maternal Aunt     Colon cancer Maternal Aunt     Heart disease Other     Diabetes Other     Hypertension Other     Seizures Son     Depression Paternal Grandfather     No Known Problems Sister     No Known Problems Brother     Thyroid disease Neg Hx        Current medications:    Current Facility-Administered Medications:     acetaminophen (TYLENOL) tablet 975 mg, 975 mg, Oral, Q8H [facility], [facility] Wright, 975 mg at 21 0604   aluminum-magnesium hydroxide-simethicone (MYLANTA) oral suspension 30 mL, 30 mL, Oral, Q4H PRN, Brown Siemens, CRNP    amLODIPine (NORVASC) tablet 5 mg, 5 mg, Oral, Daily, Brown Siemens, CRNP, 5 mg at 07/16/21 9905    aspirin chewable tablet 81 mg, 81 mg, Oral, Daily, Brown Siemens, CRNP, 81 mg at 07/16/21 9629    atorvastatin (LIPITOR) tablet 40 mg, 40 mg, Oral, Daily With Dinner, Brown Siemens, CRNP    benztropine (COGENTIN) injection 1 mg, 1 mg, Intramuscular, Q4H PRN Max 6/day, MER[facility] Nieto    benztropine (COGENTIN) tablet 0 5 mg, 0 5 mg, Oral, Q4H PRN Max 6/day, [facility] Nieto    busPIRone (BUSPAR) tablet 20 mg, 20 mg, Oral, TID, Brown Siemens, CRNP, 20 mg at 07/16/21 0830    Diclofenac Sodium (VOLTAREN) 1 % topical gel 2 g, 2 g, Topical, 4x Daily, Brown Siemens, CRNP, 2 g at 07/16/21 0906    enoxaparin (LOVENOX) subcutaneous injection 40 mg, 40 mg, Subcutaneous, Daily, Brown Siemens, CRNP, 40 mg at 44/90/55 8557    folic acid (FOLVITE) tablet 1,000 mcg, 1,000 mcg, Oral, Daily, Brown Siemens, CRNP, 1,000 mcg at 07/16/21 5254    hydrOXYzine HCL (ATARAX) tablet 25 mg, 25 mg, Oral, Q6H PRN Max 4/day, MERCY Nieto    lidocaine (LIDODERM) 5 % patch 1 patch, 1 patch, Topical, Daily, Brown Siemens, CRNP, 1 patch at 07/16/21 0830    LORazepam (ATIVAN) injection 1 mg, 1 mg, Intramuscular, Q6H PRN Max 3/day, Brown Siemens, CRNP    LORazepam (ATIVAN) tablet 0 5 mg, 0 5 mg, Oral, Q6H PRN Max 4/day, MERCY Nieto, 0 5 mg at 07/16/21 0904    LORazepam (ATIVAN) tablet 1 mg, 1 mg, Oral, Q6H PRN Max 3/day, Brown Siemens, CRNP    mirtazapine (REMERON) tablet 7 5 mg, 7 5 mg, Oral, HS PRN, Brown Siemens, CRNP    morphine (MS CONTIN) ER tablet 15 mg, 15 mg, Oral, Q12H [address], Brown Siemens, CRNP, 15 mg at 07/16/21 6019    nicotine polacrilex (NICORETTE) gum 2 mg, 2 mg, Oral, Q2H PRN, Brown Siemens, CRNP    OLANZapine (ZyPREXA) IM injection 5 mg, 5 mg, Intramuscular, Q3H PRN Max 3/day, Aleksandar MERCY Higuera    OLANZapine (ZyPREXA) tablet 5 mg, 5 mg, Oral, Q3H PRN Max 3/day, Evlyn Many, CRNP    ondansetron (ZOFRAN-ODT) dispersible tablet 4 mg, 4 mg, Oral, Q6H PRN, Evlyn Many, CRNP    pantoprazole (PROTONIX) EC tablet 20 mg, 20 mg, Oral, Early Morning, Evlyn Many, CRNP, 20 mg at 07/16/21 0607    PARoxetine (PAXIL) tablet 10 mg, 10 mg, Oral, Daily, Elisa Esposito MD    potassium chloride oral solution 20 mEq, 20 mEq, Oral, Daily, Evlyn Many, CRNP, 20 mEq at 07/16/21 0836    prazosin (MINIPRESS) capsule 2 mg, 2 mg, Oral, Daily, Evlyn Many, CRNP, 2 mg at 07/16/21 2596    pregabalin (LYRICA) capsule 100 mg, 100 mg, Oral, BID, Evlyn Many, CRNP, 100 mg at 07/16/21 8017    QUEtiapine (SEROquel) tablet 100 mg, 100 mg, Oral, HS, Evlyn Many, CRNP, 100 mg at 07/15/21 2128    QUEtiapine (SEROquel) tablet 25 mg, 25 mg, Oral, TID, Elisa Esposito MD    senna-docusate sodium (SENOKOT S) 8 6-50 mg per tablet 1 tablet, 1 tablet, Oral, Daily PRN, Evlyn Many, CRNP    Valbenazine Tosylate CAPS 80 mg, 80 mg, Oral, Daily, Evlyn Many, CRNP    Psychiatric Review Of Systems:  sleep: no  appetite changes: no  weight changes: no  energy/anergy: no  interest/pleasure/anhedonia: no  somatic symptoms: no  anxiety/panic: yes  lazarus: no  guilty/hopeless: yes  self injurious behavior/risky behavior: no    Past Psychiatric History:   Therapy, Out Patient [address]  Currently in treatment with [address]    Past Suicide attempts:  No  Past Violent behavior:  No  Past Psychiatric medication trial:  Multiple  Past Psychiatric Hospitalizations:  Now    Allergies:  No Known Allergies    Social History:  Social History     Socioeconomic History    Marital status:      Spouse name: Not on file    Number of children: 2    Years of education: graduate school     Highest education level: Not on file   Occupational History    Occupation: Disabled   Tobacco Use  Smoking status: Never Smoker    Smokeless tobacco: Never Used   Vaping Use    Vaping Use: Never used   Substance and Sexual Activity    Alcohol use: Not Currently     Comment: 2 x year; being a social drinker as per all scripts     Drug use: No    Sexual activity: Not Currently   Other Topics Concern    Not on file   Social History Narrative    Bereavement    Daily caffeine consumption, 6-8 servings per day     as per all scripts    Lives in [address] [place] [address] Strain: Medium Risk    Difficulty of Paying Living Expenses: Somewhat hard   Food Insecurity: No Food Insecurity    Worried About Running Out of Food in the Last Year: Never true    Daniel of Food in the Last Year: Never true   Transportation Needs: No Transportation Needs    Lack of Transportation (Medical): No    Lack of Transportation (Non-Medical): No   Physical Activity: Inactive    Days of Exercise per Week: 0 days    Minutes of Exercise per Session: 0 min   Stress:     Feeling of Stress :    Social Connections:  Moderately Isolated    Frequency of Communication with Friends and Family: More than three times a week    Frequency of Social Gatherings with Friends and Family: More than three times a week    Attends Shinto Services: More than 4 times per year    Active Member of Clubs or Organizations: No    Attends Club or Organization Meetings: Never    Marital Status:    Intimate Partner Violence: Not At Risk    Fear of Current or Ex-Partner: No    Emotionally Abused: No    Physically Abused: No    Sexually Abused: No       Trauma/ Abuse/ Neglect none reported    Physical Examination:     Vital Signs:  Vitals:    07/15/21 1700 07/15/21 2111 07/16/21 0641   BP: 124/66 122/78 118/61   BP Location:  Right arm Right arm   Pulse: 101 101 73   Resp: 18 18 16   Temp: 98 5 °F (36 9 °C) (!) 96 4 °F (35 8 °C) (!) 96 7 °F (35 9 °C)   TempSrc: Temporal Temporal Temporal SpO2: 99% 96% 95%         Appearance:  age appropriate and casually dressed   Behavior:  normal   Speech:  normal volume   Mood:  depressed and anxious   Affect:  constricted   Thought Process:  normal   Thought Content:  normal   Perceptual Disturbances: None   Risk Potential: none   Sensorium:  person, place, situation and time   Cognition:  intact   Consciousness:  alert and awake    Attention: attention span and concentration were age appropriate   Intellect: average   Recent Memory Short term memory grossly intact   Remote Memory Long term memory grossly intact   Insight:  good   Judgment: good      Motor Activity: no abnormal movements     Patient Strengths/Assets: ability for insight, capable of independent living  Patient Barriers/Limitations: difficulty adapting, poor physical health    Diagnostic Studies:     Recent Labs:  Results Reviewed     None          I/O Past 24 hours:  I/O last 3 completed shifts: In: 360 [P O :360]  Out: -   I/O this shift: In: 480 [P O :480]  Out: -         Impression / Plan:     [facility]    Recommended Treatment:      Medications  1) will at at increase the Seroquel during the day for anxiety and DC the Cymbalta and substitute Paxil    Non-pharmacological treatments  1) Continue with group therapy, milieu therapy and occupational therapy  2) Medical will be consulted to help manage comorbid conditions    Safety  1) Safety/communication plan established targeting dynamic risk factors above  Counseling / Coordination of Care    Total floor / unit time spent today 50 minutes  Greater than 50% of total time was spent with the patient and / or family counseling and / or coordination of care  A description of the counseling / coordination of care         Patient's Rights, confidentiality and exceptions to confidentiality, use of automated medical record, [facility] staff access to medical record, and consent to treatment reviewed        Mary Presley MD

## 2021-07-16 NOTE — ASSESSMENT & PLAN NOTE
· Patient with chronic opioid use-prescribed for management of chronic pain  · Oxycodone 5 mg and morphine 15 mg b i d    · Per PCP visit 07/12/2021-new script for meds to be written on 07/19/2021 (10-day supplies in future)  · Patient previously admitted from 06/27-7/7 for encephalopathy that was deemed to be secondary to polypharmacy  · Per PCP note- reason for strict opioid monitoring given recent admission for encephalopathy due to polypharmacy  · Morphine restarted at [facility] prior to transfer to [facility]  · Recommend f/u with PCP for ongoing monitoring/maintenance
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
## 2021-07-16 NOTE — PLAN OF CARE
Problem: Alteration in Thoughts and Perception  Goal: Treatment Goal: Gain control of psychotic behaviors/thinking, reduce/eliminate presenting symptoms and demonstrate improved reality functioning upon discharge  Outcome: Progressing  Goal: Verbalize thoughts and feelings  Description: Interventions:  - Promote a nonjudgmental and trusting relationship with the patient through active listening and therapeutic communication  - Assess patient's level of functioning, behavior and potential for risk  - Engage patient in 1 on 1 interactions  - Encourage patient to express fears, feelings, frustrations, and discuss symptoms    - [place] patient to reality, help patient recognize reality-based thinking   - Administer medications as ordered and assess for potential side effects  - Provide the patient education related to the signs and symptoms of the illness and desired effects of prescribed medications  Outcome: Progressing  Goal: Refrain from acting on delusional thinking/internal stimuli  Description: Interventions:  - Monitor patient closely, per order   - Utilize least restrictive measures   - Set reasonable limits, give positive feedback for acceptable   - Administer medications as ordered and monitor of potential side effects  Outcome: Progressing  Goal: Agree to be compliant with medication regime, as prescribed and report medication side effects  Description: Interventions:  - Offer appropriate PRN medication and supervise ingestion; conduct AIMS, as needed   Outcome: Progressing  Goal: Attend and participate in unit activities, including therapeutic, recreational, and educational groups  Description: Interventions:  -Encourage Visitation and family involvement in care  Outcome: Progressing  Goal: Recognize dysfunctional thoughts, communicate reality-based thoughts at the time of discharge  Description: Interventions:  - Provide medication and psycho-education to assist patient in compliance and developing insight into his/her illness   Outcome: Progressing  Goal: Complete daily ADLs, including personal hygiene independently, as able  Description: Interventions:  - Observe, teach, and assist patient with ADLS  - Monitor and promote a balance of rest/activity, with adequate nutrition and elimination   Outcome: Progressing     Problem: Ineffective Coping  Goal: Identifies ineffective coping skills  Outcome: Progressing  Goal: Identifies healthy coping skills  Outcome: Progressing  Goal: Demonstrates healthy coping skills  Outcome: Progressing  Goal: Patient/Family participate in treatment and DC plans  Description: Interventions:  - Provide therapeutic environment  Outcome: Progressing  Goal: Patient/Family verbalizes awareness of resources  Outcome: Progressing     Problem: Anxiety  Goal: Anxiety is at manageable level  Description: Interventions:  - Assess and monitor patient's anxiety level  - Monitor for signs and symptoms (heart palpitations, chest pain, shortness of breath, headaches, nausea, feeling jumpy, restlessness, irritable, apprehensive)  - Collaborate with interdisciplinary team and initiate plan and interventions as ordered  - Cope patient to unit/surroundings  - Explain treatment plan  - Encourage participation in care  - Encourage verbalization of concerns/fears  - Identify coping mechanisms  - Assist in developing anxiety-reducing skills  - Administer/offer alternative therapies  - Limit or eliminate stimulants  Outcome: Progressing     Problem: BEHAVIOR  Goal: Pt/Family maintain appropriate behavior and adhere to behavioral management agreement, if implemented  Description: INTERVENTIONS:  - Assess the family dynamic   - Encourage verbalization of thoughts and concerns in a socially appropriate manner  - Assess patient/family's coping skills and non-compliant behavior (including use of illegal substances)    - Utilize positive, consistent limit setting strategies supporting safety of patient, staff and others  - Initiate consult with Case Management, Spiritual Care or other ancillary services as appropriate  - If a patient's/visitor's behavior jeopardizes the safety of the patient, staff, or others, refer to organization procedure     - Notify Security of behavior or suspected illegal substances which indicate the need for search of the patient and/or belongings  - Encourage participation in the decision making process about a behavioral management agreement; implement if patient meets criteria  Outcome: Progressing     Problem: ANXIETY  Goal: Will report anxiety at manageable levels  Description: INTERVENTIONS:  - Administer medication as ordered  - Teach and encourage coping skills  - Provide emotional support  - Assess patient/family for anxiety and ability to cope  Outcome: Progressing  Goal: By discharge: Patient will verbalize 2 strategies to deal with anxiety  Description: Interventions:  - Identify any obvious source/trigger to anxiety  - Staff will assist patient in applying identified coping technique/skills  - Encourage attendance of scheduled groups and activities  Outcome: Progressing     Problem: PAIN - ADULT  Goal: Verbalizes/displays adequate comfort level or baseline comfort level  Description: Interventions:  - Encourage patient to monitor pain and request assistance  - Assess pain using appropriate pain scale  - Administer analgesics based on type and severity of pain and evaluate response  - Implement non-pharmacological measures as appropriate and evaluate response  - Consider cultural and social influences on pain and pain management  - Notify physician/advanced practitioner if interventions unsuccessful or patient reports new pain  Outcome: Progressing     Problem: DISCHARGE PLANNING  Goal: Discharge to home or other facility with appropriate resources  Description: INTERVENTIONS:  - Identify barriers to discharge w/patient and caregiver  - Arrange for needed discharge resources and transportation as appropriate  - Identify discharge learning needs (meds, wound care, etc )  - Arrange for interpretive services to assist at discharge as needed  - Refer to Case Management Department for coordinating discharge planning if the patient needs post-hospital services based on physician/advanced practitioner order or complex needs related to functional status, cognitive ability, or social support system  Outcome: Progressing     Problem: Ineffective Coping  Goal: Participates in unit activities  Description: Interventions:  - Provide therapeutic environment   - Provide required programming   - Redirect inappropriate behaviors   Outcome: Progressing

## 2021-07-16 NOTE — TREATMENT PLAN
TREATMENT PLAN REVIEW - Behavioral Health Samantha Suggs 60 y o  1965 female MRN: [id number]    [facility] Encounter: [id number]          Admit Date/Time:  7/15/2021  6:16 PM    Treatment Team: Attending Provider: Hira Beaulieu MD; Nurse Manager: Natalia Omer RN; Patient Care Assistant: Aquiles Yeh; Licensed Practical Nurse:  Sharonda Flanagan LPN; Occupational Therapy Assistant: BRII Cardenas    Diagnosis: Principal Problem:    Bipolar II disorder ([facility] )  Active Problems:    Chronic bilateral low back pain with bilateral sciatica    Chronic pain syndrome    Essential hypertension    Opioid dependence ([facility] )    Panic disorder without agoraphobia    Post traumatic stress disorder    Tardive dyskinesia    History of CVA (cerebrovascular accident)    Medical clearance for psychiatric admission      Patient Strengths/Assets: ability for insight, capable of independent living, cooperative, communication skills    Patient Barriers/Limitations: difficulty adapting, poor physical health    Short Term Goals: decrease in depressive symptoms, decrease in paranoid thoughts, improvement in insight    Long Term Goals: improvement in depression, improvement in anxiety, stabilization of mood    Progress Towards Goals: starting psychiatric medications as prescribed    Recommended Treatment: medication management, patient medication education, group therapy, milieu therapy, continued Behavioral Health psychiatric evaluation/assessment process    Treatment Frequency: daily medication monitoring, group and milieu therapy daily, monitoring through interdisciplinary rounds, monitoring through weekly patient care conferences    Expected Discharge Date:  7/30    Discharge Plan: referral for outpatient medication management with a psychiatrist, referral for outpatient psychotherapy    Treatment Plan Created/Updated By: Hira Beaulieu MD

## 2021-07-16 NOTE — PROGRESS NOTES
Met with pt 1:1 for admission self assessment   Pt noted the biggest stressor leading to admission was: panic attacks  I am scared and fearful" (Pt reports occurs everyday twice a day and lasts for hours )  Pt notes she has been dealing with this for months  This affected her ALL of the time   Pt likes most about her life: "it's not much I like  My kids and grandkids  "  Pt likes least about her life "I can't walk,  My back  The panic attacks  I can't write  I have tremors  The panic attacks make it worse and crying "    Pt attended high school and college  Pt on SSD  Pt enjoys movies  Pt would like groups on coping with symptoms of my illness and knowledge about my illness   Pt knows she will be ready for d/c when "I am coping with my anxiety and get it to go away  "   Reviewed group schedule and encouraged attending groups when able  07/16/21 0930   Activity/Group Checklist   Group Admission/Discharge   Attendance Attended   Attendance Duration (min) 16-30   Interactions Other (Comment)  (anxious)   Affect/Mood Other (Comment)  (restless)   Goals Achieved Identified feelings; Able to self-disclose; Able to recieve feedback

## 2021-07-16 NOTE — PROGRESS NOTES
Outpatient Care Management Note:    Received ADT alert  Patient was inpatient at [facility] from 7/13-7/15/21 for panic disorder  Patient was transferred to [address] [place] [address]  Will plan to follow up with patient once she is discharged

## 2021-07-16 NOTE — PLAN OF CARE
Pt did not attend any groups when prompted or offered       Problem: Alteration in Thoughts and Perception  Goal: Verbalize thoughts and feelings  Description: Interventions:  - Promote a nonjudgmental and trusting relationship with the patient through active listening and therapeutic communication  - Assess patient's level of functioning, behavior and potential for risk  - Engage patient in 1 on 1 interactions  - Encourage patient to express fears, feelings, frustrations, and discuss symptoms    - [place] patient to reality, help patient recognize reality-based thinking   - Administer medications as ordered and assess for potential side effects  - Provide the patient education related to the signs and symptoms of the illness and desired effects of prescribed medications  Outcome: Not Progressing  Goal: Attend and participate in unit activities, including therapeutic, recreational, and educational groups  Description: Interventions:  -Encourage Visitation and family involvement in care  Outcome: Not Progressing

## 2021-07-16 NOTE — ASSESSMENT & PLAN NOTE
Patient seen and examined today, medically clear for admission to [facility]

## 2021-07-16 NOTE — ASSESSMENT & PLAN NOTE
· Patient with history lumbar stenosis, s/p spinal cord stimulator (7/28/2020)  · Manage pain as detailed below

## 2021-07-16 NOTE — NURSING NOTE
Pt is pleasant and cooperative with assessment questions  She reports anxiety 4-4 and depression 7-10  Denies SI/HI/AH/VH  Pt was given PRN Ativan for anxiety with positive effects  Med and meal compliant  Will continue to monitor

## 2021-07-16 NOTE — NURSING NOTE
Pt is pleasant on approach   Pt visible for meals with some interaction with peers  Pt is calm and cooperative with care  Pt is medication compliant and good intake at dinner time  Pt still reporting of anxiety and depression but did not rate it and denies all other s/s  Will continue to monitor

## 2021-07-16 NOTE — ASSESSMENT & PLAN NOTE
· Most recent /78, preceding range 122//76  · Continue amlodipine 5 mg daily  · Continue to monitor vitals, BP

## 2021-07-17 PROBLEM — E78.2 MIXED HYPERLIPIDEMIA: Status: ACTIVE | Noted: 2021-07-17

## 2021-07-17 LAB
ANION GAP SERPL CALCULATED.3IONS-SCNC: 9 MMOL/L (ref 5–14)
BUN SERPL-MCNC: 11 MG/DL (ref 5–25)
CALCIUM SERPL-MCNC: 8.9 MG/DL (ref 8.4–10.2)
CHLORIDE SERPL-SCNC: 106 MMOL/L (ref 97–108)
CO2 SERPL-SCNC: 25 MMOL/L (ref 22–30)
CREAT SERPL-MCNC: 0.63 MG/DL (ref 0.6–1.2)
GFR SERPL CREATININE-BSD FRML MDRD: 115 ML/MIN/1.73SQ M
GLUCOSE P FAST SERPL-MCNC: 96 MG/DL (ref 70–99)
GLUCOSE SERPL-MCNC: 96 MG/DL (ref 70–99)
POTASSIUM SERPL-SCNC: 4 MMOL/L (ref 3.6–5)
SODIUM SERPL-SCNC: 140 MMOL/L (ref 137–147)

## 2021-07-17 PROCEDURE — 80048 BASIC METABOLIC PNL TOTAL CA: CPT | Performed by: PHYSICIAN ASSISTANT

## 2021-07-17 PROCEDURE — 99232 SBSQ HOSP IP/OBS MODERATE 35: CPT | Performed by: PSYCHIATRY & NEUROLOGY

## 2021-07-17 RX ADMIN — PREGABALIN 100 MG: 50 CAPSULE ORAL at 08:22

## 2021-07-17 RX ADMIN — PAROXETINE HYDROCHLORIDE 20 MG: 20 TABLET, FILM COATED ORAL at 08:21

## 2021-07-17 RX ADMIN — ACETAMINOPHEN 975 MG: 325 TABLET ORAL at 21:42

## 2021-07-17 RX ADMIN — BUSPIRONE HYDROCHLORIDE 20 MG: 10 TABLET ORAL at 08:22

## 2021-07-17 RX ADMIN — PANTOPRAZOLE SODIUM 20 MG: 20 TABLET, DELAYED RELEASE ORAL at 06:08

## 2021-07-17 RX ADMIN — QUETIAPINE FUMARATE 25 MG: 25 TABLET ORAL at 21:49

## 2021-07-17 RX ADMIN — LIDOCAINE 1 PATCH: 50 PATCH TOPICAL at 08:25

## 2021-07-17 RX ADMIN — FOLIC ACID 1000 MCG: 1 TABLET ORAL at 08:21

## 2021-07-17 RX ADMIN — QUETIAPINE FUMARATE 100 MG: 100 TABLET ORAL at 21:41

## 2021-07-17 RX ADMIN — QUETIAPINE FUMARATE 25 MG: 25 TABLET ORAL at 17:09

## 2021-07-17 RX ADMIN — MORPHINE SULFATE 15 MG: 15 TABLET, EXTENDED RELEASE ORAL at 08:22

## 2021-07-17 RX ADMIN — ATORVASTATIN CALCIUM 40 MG: 40 TABLET, FILM COATED ORAL at 17:09

## 2021-07-17 RX ADMIN — ASPIRIN 81 MG CHEWABLE TABLET 81 MG: 81 TABLET CHEWABLE at 08:22

## 2021-07-17 RX ADMIN — LORAZEPAM 0.5 MG: 0.5 TABLET ORAL at 11:49

## 2021-07-17 RX ADMIN — BUSPIRONE HYDROCHLORIDE 20 MG: 10 TABLET ORAL at 21:41

## 2021-07-17 RX ADMIN — MORPHINE SULFATE 15 MG: 15 TABLET, EXTENDED RELEASE ORAL at 21:40

## 2021-07-17 RX ADMIN — POTASSIUM CHLORIDE 20 MEQ: 20 SOLUTION ORAL at 08:24

## 2021-07-17 RX ADMIN — ACETAMINOPHEN 975 MG: 325 TABLET ORAL at 14:18

## 2021-07-17 RX ADMIN — BUSPIRONE HYDROCHLORIDE 20 MG: 10 TABLET ORAL at 17:09

## 2021-07-17 RX ADMIN — QUETIAPINE FUMARATE 25 MG: 25 TABLET ORAL at 08:22

## 2021-07-17 RX ADMIN — ACETAMINOPHEN 975 MG: 325 TABLET ORAL at 06:08

## 2021-07-17 RX ADMIN — PREGABALIN 100 MG: 50 CAPSULE ORAL at 17:09

## 2021-07-17 RX ADMIN — ENOXAPARIN SODIUM 40 MG: 40 INJECTION SUBCUTANEOUS at 08:21

## 2021-07-17 NOTE — NURSING NOTE
Pt cooperative with care and medication adherent- declined Voltaren gel only  Prazosin and Amlodipine held due to not meeting BP parameters  Reports 7/10 depression and states that "it is not that bad " Denies SI/HI, AH/VH  Pt states that her son will be bringing in her [place] from home today  Reports good sleep, fair appetite, and adequate energy  Pt c/o of increased anxiety rating it 4/4 with nausea and tremors noted  States that anxiety temporarily decreased from morning medications, but has escalated and she is finding it debilitating and is unable to leave the room  Reports that 0 5mg Ativan PO is her typical dose that she takes at home  Allen anxiety score 21  Ativan 0 5mg PO PRN given  1hr post intervention- pt resting comfortably in bed, napping   PRN effective

## 2021-07-17 NOTE — PLAN OF CARE
Problem: Alteration in Thoughts and Perception  Goal: Treatment Goal: Gain control of psychotic behaviors/thinking, reduce/eliminate presenting symptoms and demonstrate improved reality functioning upon discharge  Outcome: Progressing  Goal: Verbalize thoughts and feelings  Description: Interventions:  - Promote a nonjudgmental and trusting relationship with the patient through active listening and therapeutic communication  - Assess patient's level of functioning, behavior and potential for risk  - Engage patient in 1 on 1 interactions  - Encourage patient to express fears, feelings, frustrations, and discuss symptoms    - [place] patient to reality, help patient recognize reality-based thinking   - Administer medications as ordered and assess for potential side effects  - Provide the patient education related to the signs and symptoms of the illness and desired effects of prescribed medications  Outcome: Progressing  Goal: Refrain from acting on delusional thinking/internal stimuli  Description: Interventions:  - Monitor patient closely, per order   - Utilize least restrictive measures   - Set reasonable limits, give positive feedback for acceptable   - Administer medications as ordered and monitor of potential side effects  Outcome: Progressing  Goal: Agree to be compliant with medication regime, as prescribed and report medication side effects  Description: Interventions:  - Offer appropriate PRN medication and supervise ingestion; conduct AIMS, as needed   Outcome: Progressing  Goal: Recognize dysfunctional thoughts, communicate reality-based thoughts at the time of discharge  Description: Interventions:  - Provide medication and psycho-education to assist patient in compliance and developing insight into his/her illness   Outcome: Progressing  Goal: Complete daily ADLs, including personal hygiene independently, as able  Description: Interventions:  - Observe, teach, and assist patient with ADLS  - Monitor and promote a balance of rest/activity, with adequate nutrition and elimination   Outcome: Progressing     Problem: Ineffective Coping  Goal: Identifies ineffective coping skills  Outcome: Progressing  Goal: Identifies healthy coping skills  Outcome: Progressing  Goal: Demonstrates healthy coping skills  Outcome: Progressing  Goal: Patient/Family participate in treatment and DC plans  Description: Interventions:  - Provide therapeutic environment  Outcome: Progressing  Goal: Patient/Family verbalizes awareness of resources  Outcome: Progressing     Problem: Anxiety  Goal: Anxiety is at manageable level  Description: Interventions:  - Assess and monitor patient's anxiety level  - Monitor for signs and symptoms (heart palpitations, chest pain, shortness of breath, headaches, nausea, feeling jumpy, restlessness, irritable, apprehensive)  - Collaborate with interdisciplinary team and initiate plan and interventions as ordered  - [place] patient to unit/surroundings  - Explain treatment plan  - Encourage participation in care  - Encourage verbalization of concerns/fears  - Identify coping mechanisms  - Assist in developing anxiety-reducing skills  - Administer/offer alternative therapies  - Limit or eliminate stimulants  Outcome: Progressing     Problem: BEHAVIOR  Goal: Pt/Family maintain appropriate behavior and adhere to behavioral management agreement, if implemented  Description: INTERVENTIONS:  - Assess the family dynamic   - Encourage verbalization of thoughts and concerns in a socially appropriate manner  - Assess patient/family's coping skills and non-compliant behavior (including use of illegal substances)    - Utilize positive, consistent limit setting strategies supporting safety of patient, staff and others  - Initiate consult with Case Management, Spiritual Care or other ancillary services as appropriate  - If a patient's/visitor's behavior jeopardizes the safety of the patient, staff, or others, refer to organization procedure     - Notify Security of behavior or suspected illegal substances which indicate the need for search of the patient and/or belongings  - Encourage participation in the decision making process about a behavioral management agreement; implement if patient meets criteria  Outcome: Progressing     Problem: ANXIETY  Goal: Will report anxiety at manageable levels  Description: INTERVENTIONS:  - Administer medication as ordered  - Teach and encourage coping skills  - Provide emotional support  - Assess patient/family for anxiety and ability to cope  Outcome: Progressing  Goal: By discharge: Patient will verbalize 2 strategies to deal with anxiety  Description: Interventions:  - Identify any obvious source/trigger to anxiety  - Staff will assist patient in applying identified coping technique/skills  - Encourage attendance of scheduled groups and activities  Outcome: Progressing     Problem: PAIN - ADULT  Goal: Verbalizes/displays adequate comfort level or baseline comfort level  Description: Interventions:  - Encourage patient to monitor pain and request assistance  - Assess pain using appropriate pain scale  - Administer analgesics based on type and severity of pain and evaluate response  - Implement non-pharmacological measures as appropriate and evaluate response  - Consider cultural and social influences on pain and pain management  - Notify physician/advanced practitioner if interventions unsuccessful or patient reports new pain  Outcome: Progressing     Problem: Ineffective Coping  Goal: Participates in unit activities  Description: Interventions:  - Provide therapeutic environment   - Provide required programming   - Redirect inappropriate behaviors   Outcome: Progressing     Problem: Alteration in Thoughts and Perception  Goal: Attend and participate in unit activities, including therapeutic, recreational, and educational groups  Description: Interventions:  -Encourage Visitation and family involvement in care  Outcome: Not Progressing

## 2021-07-17 NOTE — PROGRESS NOTES
60 y o  was seen today, on unit  According to the patient and nursing staff, the following is reported: nursing reporting patient is doing fairly well, no behaviors on unit  Often needing PRN for pain meds or anxiety  Patient reporting improving mood and anxiety  Sleep and appetite are fair  Has been complaint with medications and no agitation witnessed  Medications reviewed, denies SI/HI, denies A/V drake         Mental Status Evaluation:  Appearance:  Adequate hygiene and grooming   Behavior:  cooperative and friendly   Mood:  anxious   Affect: constricted   Speech: Normal rate and Normal volume   Thought Process:  Goal directed and coherent   Thought Content:  Does not verbalize delusional material   Perceptual Disturbances: Denies hallucinations and does not appear to be responding to internal stimuli   Risk Potential: No suicidal or homicidal ideation   Orientation:   O x 3       Current Facility-Administered Medications   Medication Dose Route Frequency Provider Last Rate    acetaminophen  975 mg Oral Q8H [address] Orysia Rochelle, CRNP      aluminum-magnesium hydroxide-simethicone  30 mL Oral Q4H PRN Orysia Rochelle, CRNP      amLODIPine  5 mg Oral Daily Orysia Rochelle, CRNP      aspirin  81 mg Oral Daily Orysia Rochelle, CRNP      atorvastatin  40 mg Oral Daily [employer], CRNP      benztropine  1 mg Intramuscular Q4H PRN Max 6/day Orysia Rochelle, CRNP      benztropine  0 5 mg Oral Q4H PRN Max 6/day Orysia Rochelle, CRNP      busPIRone  20 mg Oral TID Orysia Rochelle, CRNP      Diclofenac Sodium  2 g Topical 4x Daily Orysia Rochelle, CRNP      enoxaparin  40 mg Subcutaneous Daily Orysia Rochelle, CRNP      folic acid  5,760 mcg Oral Daily Orysia Rochelle, CRNP      hydrOXYzine HCL  25 mg Oral Q6H PRN Max 4/day Orysia Rochelle, CRNP      lidocaine  1 patch Topical Daily Orysia Rochelle, CRNP      LORazepam  1 mg Intramuscular Q6H PRN Max 3/day Orysia Rochelle, CRNP      LORazepam  0 5 mg Oral Q6H PRN Max 4/day Gearold Span, CRNP      LORazepam  1 mg Oral Q6H PRN Max 3/day Gearold Span, CRNP      mirtazapine  7 5 mg Oral HS PRN Gearold Span, CRNP      morphine  15 mg Oral Q12H [address] Gearold Span, CRNP      nicotine polacrilex  2 mg Oral Q2H PRN Gearold Span, CRNP      OLANZapine  5 mg Intramuscular Q3H PRN Max 3/day Gearold Span, CRNP      OLANZapine  5 mg Oral Q3H PRN Max 3/day Gearold Span, CRNP      ondansetron  4 mg Oral Q6H PRN Gearold Span, CRNP      pantoprazole  20 mg Oral Early Morning Gearold Span, CRNP      PARoxetine  20 mg Oral Daily Jacque Jackson MD      potassium chloride  20 mEq Oral Daily Gearold Span, CRNP      prazosin  2 mg Oral Daily Gearold Span, CRNP      pregabalin  100 mg Oral BID Gearold Span, CRNP      QUEtiapine  100 mg Oral HS Gearold Span, CRNP      QUEtiapine  25 mg Oral TID Jacque Jackson MD      senna-docusate sodium  1 tablet Oral Daily PRN Gearold Span, CRNP      Valbenazine Tosylate  80 mg Oral Daily Gearold Span, CRNP        Patient Active Problem List    Diagnosis Date Noted    Mixed hyperlipidemia 07/17/2021    Medical clearance for psychiatric admission 07/16/2021    History of CVA (cerebrovascular accident) 07/13/2021    Encephalopathy 06/27/2021    Nausea 05/07/2021    Multiple closed fractures of metatarsal bone of right foot 05/02/2021    Chronic back pain 05/02/2021    Arthritis of right knee 10/06/2020    Dysphagia 09/05/2020    Ambulatory dysfunction 09/04/2020    Status post insertion of spinal cord stimulator 08/11/2020    Superficial bacterial infection of skin 07/20/2020    Scar of back 07/20/2020    Impaired skin integrity associated with surgical incision 07/20/2020    Rash 06/03/2020    Primary osteoarthritis of both knees 05/01/2020    Patellofemoral disorder of both knees 05/01/2020    Tardive dyskinesia 03/09/2020    MIMI (obstructive sleep apnea)     Complaint related to dreams 02/13/2020    Morbid [facility] with BMI of 40 0-44 9, [facility] ) 02/06/2020    Family history of colorectal cancer 12/11/2019    Encounter for long-term use of opiate analgesic 12/03/2019    Post laminectomy syndrome 10/07/2019    Lumbar disc disease with radiculopathy 02/02/2018    Spinal stenosis of lumbar region with neurogenic claudication 02/02/2018    Lumbar radiculopathy 12/08/2017    Bilateral hip pain 06/19/2017    Primary localized osteoarthritis of both knees 06/16/2017    Chronic bilateral low back pain with bilateral sciatica 03/22/2017    Chronic pain syndrome 02/28/2017    Opioid dependence ([facility] ) 02/28/2017    Sacroiliitis [facility] ) 02/28/2017    Lumbar spondylosis 10/31/2016    Osteoarthritis of both hips 10/31/2016    Generalized anxiety disorder 10/26/2016    Anxiety 09/08/2016    Major depressive disorder, recurrent episode, moderate degree ([facility] ) 09/08/2016    Right knee pain 06/06/2016    Recent unexplained weight loss 06/06/2016    Fatigue 10/26/2015    Bipolar II disorder ([facility] ) 06/18/2015    Panic disorder without agoraphobia 06/18/2015    Post traumatic stress disorder 06/18/2015    Left hip pain 07/25/2014    Intractable low back pain 06/16/2014    Tremor 06/12/2014    Migraine 05/27/2014    Esophageal reflux 05/01/2014    Cognitive disorder 04/18/2014    Female pelvic pain 10/30/2013    Pain in joint, shoulder region 10/28/2013    Overactive bladder 09/26/2013    Osteoarthritis of knee 02/20/2013    Insomnia 01/31/2013    History of hypokalemia 01/03/2013    Urinary incontinence 09/24/2012    Vitamin D deficiency 09/18/2012    Fibromyalgia 09/14/2012    Essential hypertension 09/14/2012        Plan: Medications reviewed, benefits/risks discussed with patient, condition reviewed with treatment team  Routine monitoring will occur on unit, evaluation of effectiveness and side effects of medications  Will continue w current meds

## 2021-07-17 NOTE — ASSESSMENT & PLAN NOTE
· Most recent lipid panel 02/13/2020:  Cholesterol 215, triglycerides 74, HDL 84,     · Continue atorvastatin 40 mg daily  · Recommend follow-up with PCP outpatient for ongoing monitoring/maintenance

## 2021-07-17 NOTE — ASSESSMENT & PLAN NOTE
· Patient follows with Marga Piedra  · Last visit 06/14/2021, received corticosteroid injection  · Continue Voltaren gel, pain management  · Recommend follow-up with Ortho outpatient

## 2021-07-17 NOTE — ASSESSMENT & PLAN NOTE
· During recent admission to [facility], patient was started on daily supplementation of potassium (20 mEq)  · Potassium 3 4 on admission  · Review of recent potassium levels indicates patient has intermittently experience low potassium 2 8-3 3 within the past year  · BMP 07/15/2021:  Potassium 3 9 (stable)  · Recheck BMP in AM to ensure potassium has not decreased further; replete with additional potassium if indicated  · Consider additional monitoring of BMP if potassium decreases  · Continue daily supplementation  · Encourage follow-up with PCP outpatient for ongoing monitoring/maintenance

## 2021-07-17 NOTE — NURSING NOTE
Pt is calm and cooperative  Pt reports 7/10 depression and 2/4 anxiety  Denies SI and HI  Pt is meal compliant with dinner and meds  Pt was visible in the milieu  Pt shows no signs of distress  Will continue to monitor pt frequently

## 2021-07-18 PROBLEM — D72.819 LEUKOPENIA: Status: ACTIVE | Noted: 2021-07-18

## 2021-07-18 LAB
BASOPHILS # BLD AUTO: 0 THOUSANDS/ΜL (ref 0–0.1)
BASOPHILS NFR BLD AUTO: 1 % (ref 0–1)
EOSINOPHIL # BLD AUTO: 0.2 THOUSAND/ΜL (ref 0–0.4)
EOSINOPHIL NFR BLD AUTO: 4 % (ref 0–6)
ERYTHROCYTE [DISTWIDTH] IN BLOOD BY AUTOMATED COUNT: 14.8 %
HCT VFR BLD AUTO: 37.9 % (ref 36–46)
HGB BLD-MCNC: 12.6 G/DL (ref 12–16)
LYMPHOCYTES # BLD AUTO: 1.8 THOUSANDS/ΜL (ref 0.5–4)
LYMPHOCYTES NFR BLD AUTO: 44 % (ref 25–45)
MCH RBC QN AUTO: 29.6 PG (ref 26–34)
MCHC RBC AUTO-ENTMCNC: 33.1 G/DL (ref 31–36)
MCV RBC AUTO: 90 FL (ref 80–100)
MONOCYTES # BLD AUTO: 0.3 THOUSAND/ΜL (ref 0.2–0.9)
MONOCYTES NFR BLD AUTO: 8 % (ref 1–10)
NEUTROPHILS # BLD AUTO: 1.7 THOUSANDS/ΜL (ref 1.8–7.8)
NEUTS SEG NFR BLD AUTO: 42 % (ref 45–65)
PLATELET # BLD AUTO: 300 THOUSANDS/UL (ref 150–450)
PMV BLD AUTO: 7.4 FL (ref 8.9–12.7)
RBC # BLD AUTO: 4.24 MILLION/UL (ref 4–5.2)
WBC # BLD AUTO: 4 THOUSAND/UL (ref 4.5–11)

## 2021-07-18 PROCEDURE — 99232 SBSQ HOSP IP/OBS MODERATE 35: CPT | Performed by: PSYCHIATRY & NEUROLOGY

## 2021-07-18 PROCEDURE — 85025 COMPLETE CBC W/AUTO DIFF WBC: CPT | Performed by: PHYSICIAN ASSISTANT

## 2021-07-18 RX ORDER — POLYETHYLENE GLYCOL 3350 17 G/17G
17 POWDER, FOR SOLUTION ORAL DAILY PRN
Status: DISCONTINUED | OUTPATIENT
Start: 2021-07-18 | End: 2021-08-05 | Stop reason: HOSPADM

## 2021-07-18 RX ORDER — POTASSIUM CHLORIDE 20 MEQ/1
20 TABLET, EXTENDED RELEASE ORAL DAILY
Status: DISCONTINUED | OUTPATIENT
Start: 2021-07-19 | End: 2021-08-05 | Stop reason: HOSPADM

## 2021-07-18 RX ADMIN — HYDROXYZINE HYDROCHLORIDE 25 MG: 25 TABLET, FILM COATED ORAL at 17:50

## 2021-07-18 RX ADMIN — LORAZEPAM 1 MG: 1 TABLET ORAL at 15:06

## 2021-07-18 RX ADMIN — ACETAMINOPHEN 975 MG: 325 TABLET ORAL at 05:59

## 2021-07-18 RX ADMIN — BUSPIRONE HYDROCHLORIDE 20 MG: 10 TABLET ORAL at 21:03

## 2021-07-18 RX ADMIN — ATORVASTATIN CALCIUM 40 MG: 40 TABLET, FILM COATED ORAL at 15:39

## 2021-07-18 RX ADMIN — ENOXAPARIN SODIUM 40 MG: 40 INJECTION SUBCUTANEOUS at 08:37

## 2021-07-18 RX ADMIN — QUETIAPINE FUMARATE 25 MG: 25 TABLET ORAL at 15:10

## 2021-07-18 RX ADMIN — ACETAMINOPHEN 975 MG: 325 TABLET ORAL at 13:29

## 2021-07-18 RX ADMIN — FOLIC ACID 1000 MCG: 1 TABLET ORAL at 08:37

## 2021-07-18 RX ADMIN — ACETAMINOPHEN 975 MG: 325 TABLET ORAL at 21:03

## 2021-07-18 RX ADMIN — DOCUSATE SODIUM 50 MG AND SENNOSIDES 8.6 MG 1 TABLET: 8.6; 5 TABLET, FILM COATED ORAL at 14:29

## 2021-07-18 RX ADMIN — QUETIAPINE FUMARATE 25 MG: 25 TABLET ORAL at 21:03

## 2021-07-18 RX ADMIN — AMLODIPINE BESYLATE 5 MG: 5 TABLET ORAL at 08:36

## 2021-07-18 RX ADMIN — PHENYTOIN 2 MG: 125 SUSPENSION ORAL at 08:36

## 2021-07-18 RX ADMIN — POTASSIUM CHLORIDE 20 MEQ: 20 SOLUTION ORAL at 08:37

## 2021-07-18 RX ADMIN — QUETIAPINE FUMARATE 100 MG: 100 TABLET ORAL at 21:04

## 2021-07-18 RX ADMIN — DICLOFENAC SODIUM 2 G: 10 GEL TOPICAL at 12:51

## 2021-07-18 RX ADMIN — PANTOPRAZOLE SODIUM 20 MG: 20 TABLET, DELAYED RELEASE ORAL at 05:58

## 2021-07-18 RX ADMIN — QUETIAPINE FUMARATE 25 MG: 25 TABLET ORAL at 08:35

## 2021-07-18 RX ADMIN — VALBENAZINE 80 MG: 80 CAPSULE ORAL at 08:38

## 2021-07-18 RX ADMIN — DICLOFENAC SODIUM 2 G: 10 GEL TOPICAL at 08:53

## 2021-07-18 RX ADMIN — DICLOFENAC SODIUM 2 G: 10 GEL TOPICAL at 17:08

## 2021-07-18 RX ADMIN — PAROXETINE HYDROCHLORIDE 20 MG: 20 TABLET, FILM COATED ORAL at 08:35

## 2021-07-18 RX ADMIN — MORPHINE SULFATE 15 MG: 15 TABLET, EXTENDED RELEASE ORAL at 21:03

## 2021-07-18 RX ADMIN — PREGABALIN 100 MG: 50 CAPSULE ORAL at 17:08

## 2021-07-18 RX ADMIN — ASPIRIN 81 MG CHEWABLE TABLET 81 MG: 81 TABLET CHEWABLE at 08:37

## 2021-07-18 RX ADMIN — MORPHINE SULFATE 15 MG: 15 TABLET, EXTENDED RELEASE ORAL at 08:36

## 2021-07-18 RX ADMIN — LORAZEPAM 1 MG: 1 TABLET ORAL at 08:44

## 2021-07-18 RX ADMIN — DICLOFENAC SODIUM 2 G: 10 GEL TOPICAL at 21:08

## 2021-07-18 RX ADMIN — PREGABALIN 100 MG: 50 CAPSULE ORAL at 08:37

## 2021-07-18 RX ADMIN — BUSPIRONE HYDROCHLORIDE 20 MG: 10 TABLET ORAL at 08:36

## 2021-07-18 RX ADMIN — BUSPIRONE HYDROCHLORIDE 20 MG: 10 TABLET ORAL at 15:39

## 2021-07-18 NOTE — ASSESSMENT & PLAN NOTE
· During recent admission to [facility], patient was started on daily supplementation of potassium (20 mEq)  · Potassium 3 4 on admission  · Review of recent potassium levels indicates patient has intermittently experience low potassium 2 8-3 3 within the past year  · BMP 07/15/2021:  Potassium 3 9 (stable)  · Continue daily supplementation  · Encourage follow-up with PCP outpatient for ongoing monitoring/maintenance    **7/17: repeat BMP revealed K+ 4 0, stable  · continue plan as above

## 2021-07-18 NOTE — NURSING NOTE
Patient complained of constipation despite having a bowel movement on 7/17/21 or yesterday  PRN Senna S given at [phone] with results still pending at this time  Patient reports severe anxiety to the point of almost having a "panic attack" and requested PRN Ativan for same  PRN Ativan given at [phone] with decrease in anxiety reported by patient and patient stating "that pill works good"  No further complaints of anxiety  Will continue to monitor patient for changes in mood or behavior

## 2021-07-18 NOTE — PLAN OF CARE
Problem: Ineffective Coping  Goal: Identifies ineffective coping skills  Outcome: Progressing  Goal: Identifies healthy coping skills  Outcome: Progressing  Goal: Demonstrates healthy coping skills  Outcome: Progressing  Goal: Patient/Family participate in treatment and DC plans  Description: Interventions:  - Provide therapeutic environment  Outcome: Progressing  Goal: Patient/Family verbalizes awareness of resources  Outcome: Progressing

## 2021-07-18 NOTE — QUICK NOTE
Patient was not physically seen today, however a thorough chart review was performed including review of vitals and new lab results  Leukopenia  Assessment & Plan  · CBC 7/15/2021 revealed WBC 3 08, ANC 1 99; hgb 14 3, platelets 824  · CBC 7/14/2021:  WBCs 3 88  · CBC 7/11/2021:  WBC 6 54, ANC 5 24  · Chart review reveals patient's WBCs tend to occasional trend low: avg 3 5-5 5; occasionally higher around 6-8  · VSS, patient afebrile  · Etiology unclear- potentially nutritional vs infection (viral) vs medication side effect  · Repeat CBCd in AM to monitor WBCs, ANC    Multiple closed fractures of metatarsal bone of right foot  Assessment & Plan  · Pt with h/o recent metatarsal fractures of right foot  · Patient reports that she hit her right foot on her walker today, notes worsening pain of right foot   · Sharp 7/10 pain with weight-bearing/ambulation; denies pain at rest  · Patient also notes that she was recently admitted for AMS/encephalopathy (6/27-7/7/21) and states she is unsure if she re-injured her right foot during that time   · Per ortho note 6/14/2021 Sanket Gibbs)- patient has fracture of right 2nd through 4th metatarsal bases on originally on 05/01/2021; fractures noted to be healing at this visit  · Plan at this visit was as follows:  · Weightbearing as tolerated right lower extremity and postoperative shoe  Return to normal she was tolerating full weight without pain and postop shoe  · X-ray right foot 05/01/2021:  Nondisplaced fractures 2nd and 4th metatarsal, possibly involving base of 3rd metatarsal  · X-ray right foot 06/14/2021:  Fractures of the bases of 2nd through 4th metatarsals without significant interval change in alignment  **7/17:  X-ray right foot 07/16/2021:  "No acute osseous abnormality  Unchanged fractures of significant 4th metatarsals  · Nondisplaced fractures of 2nd and 4th metatarsal bases with some sclerosis about the fracture site suggesting healing     · Moderate hallux valgus"  · No acute fracture   · Recommend weight-bearing as tolerated and continued ambulation with walker    History of hypokalemia  Assessment & Plan  · During recent admission to [facility], patient was started on daily supplementation of potassium (20 mEq)  · Potassium 3 4 on admission  · Review of recent potassium levels indicates patient has intermittently experience low potassium 2 8-3 3 within the past year  · BMP 07/15/2021:  Potassium 3 9 (stable)  · Continue daily supplementation  · Encourage follow-up with PCP outpatient for ongoing monitoring/maintenance  **7/17: repeat BMP revealed K+ 4 0, stable  · continue plan as above

## 2021-07-18 NOTE — ASSESSMENT & PLAN NOTE
· CBC 7/15/2021 revealed WBC 3 08, ANC 1 99; hgb 14 3, platelets 796  · CBC 7/14/2021:  WBCs 3 88  · CBC 7/11/2021:  WBC 6 54, ANC 5 24  · Chart review reveals patient's WBCs tend to occasionally trend low: avg 3 5-5 5; occasionally higher around 6-8  · ANC also tends to occasionally trend low (avg 2-4)  · VSS, patient afebrile  · Etiology unclear- potentially nutritional vs infection (viral) vs medication side effect    7/18**  · CBCd 7/18/2021:  WBCs 4 0 (increased from 3 08), ANC 1 7 (decreased from 1 99)  · Hemoglobin 12 6, platelets 317  · Vital signs remain stable, patient afebrile  · Will continue to monitor, repeat CBCd 7/20/2021

## 2021-07-18 NOTE — ASSESSMENT & PLAN NOTE
· Pt with h/o recent metatarsal fractures of right foot  · Patient reports that she hit her right foot on her walker today, notes worsening pain of right foot   · Sharp 7/10 pain with weight-bearing/ambulation; denies pain at rest  · Patient also notes that she was recently admitted for AMS/encephalopathy (6/27-7/7/21) and states she is unsure if she re-injured her right foot during that time   · Per ortho note 6/14/2021 Anali Elizalde)- patient has fracture of right 2nd through 4th metatarsal bases on originally on 05/01/2021; fractures noted to be healing at this visit  · Plan at this visit was as follows:  · Weightbearing as tolerated right lower extremity and postoperative shoe  Return to normal she was tolerating full weight without pain and postop shoe  · X-ray right foot 05/01/2021:  Nondisplaced fractures 2nd and 4th metatarsal, possibly involving base of 3rd metatarsal  · X-ray right foot 06/14/2021:  Fractures of the bases of 2nd through 4th metatarsals without significant interval change in alignment  **7/17:  X-ray right foot 07/16/2021:  "No acute osseous abnormality  Unchanged fractures of significant 4th metatarsals  · Nondisplaced fractures of 2nd and 4th metatarsal bases with some sclerosis about the fracture site suggesting healing     · Moderate hallux valgus"  · Recommend weight-bearing as tolerated and continued ambulation with walker

## 2021-07-18 NOTE — TREATMENT TEAM
07/17/21 2313   Provider Notification   Reason for Communication Evaluate   Provider Name [facility]   Provider Role Hospitalist   Method of Communication Other (Comment)  (tiger text)   Response Waiting for response   Notification Time 4357   This writer asked for evalutation of a blood work lab value for low WBC  Provider notified  See orders

## 2021-07-18 NOTE — NURSING NOTE
Patient is medication and meal compliant  No complaints of pain or discomfort  Patient is somewhat isolative to self and to her room so far this shift  Patient reports her depression at 7/10 and anxiety at 4/4  Patient requested PRN Ativan first thing this morning  Patient states she takes this with her Buspar at home and this is what her outside psychiatrist told her to do to prevent panic attacks  Patient states this is her main issue and why she is here  Patient given PRN Ativan at 4995 with good results noted and no further complaints of anxiety or panic attacks  Patient denies suicidal ideation and no attempts at self harm noted  Will continue to monitor patient for changes in mood or behavior

## 2021-07-18 NOTE — NURSING NOTE
Patient complained of mild anxiety and requested medication for same  PRN Atarax given after education on overuse of PRN medications  PRN atarax given at 1750 with effectiveness noted and no further complaints of anxiety  Will continue to monitor patient for changes in mood or behavior

## 2021-07-19 RX ORDER — PAROXETINE HYDROCHLORIDE 20 MG/1
30 TABLET, FILM COATED ORAL DAILY
Status: DISCONTINUED | OUTPATIENT
Start: 2021-07-19 | End: 2021-07-21

## 2021-07-19 RX ORDER — BUSPIRONE HYDROCHLORIDE 15 MG/1
30 TABLET ORAL 3 TIMES DAILY
Status: DISCONTINUED | OUTPATIENT
Start: 2021-07-19 | End: 2021-07-26

## 2021-07-19 RX ORDER — HYDROXYZINE HYDROCHLORIDE 50 MG/ML
50 INJECTION, SOLUTION INTRAMUSCULAR 3 TIMES DAILY
Status: DISCONTINUED | OUTPATIENT
Start: 2021-07-19 | End: 2021-07-19 | Stop reason: SDUPTHER

## 2021-07-19 RX ORDER — BUSPIRONE HYDROCHLORIDE 10 MG/1
10 TABLET ORAL 3 TIMES DAILY
Status: DISCONTINUED | OUTPATIENT
Start: 2021-07-19 | End: 2021-07-19

## 2021-07-19 RX ORDER — HYDROXYZINE 50 MG/1
50 TABLET, FILM COATED ORAL 3 TIMES DAILY
Status: DISCONTINUED | OUTPATIENT
Start: 2021-07-19 | End: 2021-08-05 | Stop reason: HOSPADM

## 2021-07-19 RX ADMIN — PAROXETINE HYDROCHLORIDE 30 MG: 20 TABLET, FILM COATED ORAL at 08:35

## 2021-07-19 RX ADMIN — HYDROXYZINE HYDROCHLORIDE 25 MG: 25 TABLET, FILM COATED ORAL at 16:46

## 2021-07-19 RX ADMIN — HYDROXYZINE HYDROCHLORIDE 25 MG: 25 TABLET, FILM COATED ORAL at 09:26

## 2021-07-19 RX ADMIN — AMLODIPINE BESYLATE 5 MG: 5 TABLET ORAL at 08:31

## 2021-07-19 RX ADMIN — VALBENAZINE 80 MG: 80 CAPSULE ORAL at 08:31

## 2021-07-19 RX ADMIN — ACETAMINOPHEN 975 MG: 325 TABLET ORAL at 13:48

## 2021-07-19 RX ADMIN — HYDROXYZINE HYDROCHLORIDE 50 MG: 50 TABLET, FILM COATED ORAL at 20:01

## 2021-07-19 RX ADMIN — ASPIRIN 81 MG CHEWABLE TABLET 81 MG: 81 TABLET CHEWABLE at 08:32

## 2021-07-19 RX ADMIN — QUETIAPINE FUMARATE 25 MG: 25 TABLET ORAL at 08:33

## 2021-07-19 RX ADMIN — ENOXAPARIN SODIUM 40 MG: 40 INJECTION SUBCUTANEOUS at 08:47

## 2021-07-19 RX ADMIN — DICLOFENAC SODIUM 2 G: 10 GEL TOPICAL at 08:29

## 2021-07-19 RX ADMIN — ATORVASTATIN CALCIUM 40 MG: 40 TABLET, FILM COATED ORAL at 15:44

## 2021-07-19 RX ADMIN — OLANZAPINE 5 MG: 5 TABLET, FILM COATED ORAL at 18:02

## 2021-07-19 RX ADMIN — QUETIAPINE FUMARATE 25 MG: 25 TABLET ORAL at 20:00

## 2021-07-19 RX ADMIN — FOLIC ACID 1000 MCG: 1 TABLET ORAL at 08:30

## 2021-07-19 RX ADMIN — PANTOPRAZOLE SODIUM 20 MG: 20 TABLET, DELAYED RELEASE ORAL at 05:44

## 2021-07-19 RX ADMIN — PREGABALIN 100 MG: 50 CAPSULE ORAL at 17:02

## 2021-07-19 RX ADMIN — ACETAMINOPHEN 975 MG: 325 TABLET ORAL at 05:44

## 2021-07-19 RX ADMIN — ACETAMINOPHEN 975 MG: 325 TABLET ORAL at 21:14

## 2021-07-19 RX ADMIN — MORPHINE SULFATE 15 MG: 15 TABLET, EXTENDED RELEASE ORAL at 08:29

## 2021-07-19 RX ADMIN — DICLOFENAC SODIUM 2 G: 10 GEL TOPICAL at 17:01

## 2021-07-19 RX ADMIN — PHENYTOIN 2 MG: 125 SUSPENSION ORAL at 08:33

## 2021-07-19 RX ADMIN — QUETIAPINE FUMARATE 100 MG: 100 TABLET ORAL at 21:14

## 2021-07-19 RX ADMIN — QUETIAPINE FUMARATE 25 MG: 25 TABLET ORAL at 15:43

## 2021-07-19 RX ADMIN — BUSPIRONE HYDROCHLORIDE 30 MG: 15 TABLET ORAL at 20:00

## 2021-07-19 RX ADMIN — BUSPIRONE HYDROCHLORIDE 20 MG: 10 TABLET ORAL at 08:33

## 2021-07-19 RX ADMIN — MORPHINE SULFATE 15 MG: 15 TABLET, EXTENDED RELEASE ORAL at 20:00

## 2021-07-19 RX ADMIN — POTASSIUM CHLORIDE 20 MEQ: 20 TABLET, EXTENDED RELEASE ORAL at 08:33

## 2021-07-19 RX ADMIN — PREGABALIN 100 MG: 50 CAPSULE ORAL at 08:32

## 2021-07-19 RX ADMIN — BUSPIRONE HYDROCHLORIDE 20 MG: 10 TABLET ORAL at 15:43

## 2021-07-19 RX ADMIN — DICLOFENAC SODIUM 2 G: 10 GEL TOPICAL at 12:29

## 2021-07-19 NOTE — NURSING NOTE
Patient continued to ask if the doctor was notified and what he could do about her condition  Patient escalated despite having already notified the doctor and advising her that she would have to wait for a response  Patient continued saying "help me", "help me" numerous times and insisting that she "can't handle this"  Patient then starting moaning and crying and was unable to be verbally redirected  PRN Zyprexa given at 1802 for severe agitation and inability to be verbally redirected  No response from off hours provider at this time  Patient to be reassessed one hour post administration around 1902 for effectiveness of Zyprea and decrease in agitation

## 2021-07-19 NOTE — PHYSICAL THERAPY NOTE
Physical Therapy Evaluation    Patient's Name: Samantha Mena    Admitting Diagnosis  Major depressive disorder, single episode, unspecified [F32 9]  Panic disorder [F41 0]    Problem List  Patient Active Problem List   Diagnosis    Chronic bilateral low back pain with bilateral sciatica    Right knee pain    Chronic pain syndrome    Lumbar radiculopathy    Lumbar spondylosis    Fibromyalgia    Lumbar disc disease with radiculopathy    Spinal stenosis of lumbar region with neurogenic claudication    Anxiety    Pain in joint, shoulder region    Intractable low back pain    Bilateral hip pain    Bipolar II disorder ([address] )    Cognitive disorder    Esophageal reflux    Fatigue    Female pelvic pain    Generalized anxiety disorder    Essential hypertension    History of hypokalemia    Insomnia    Major depressive disorder, recurrent episode, moderate degree (HCC)    Migraine    Opioid dependence (HCC)    Osteoarthritis of both hips    Osteoarthritis of knee    Overactive bladder    Left hip pain    Panic disorder without agoraphobia    Post traumatic stress disorder    Primary localized osteoarthritis of both knees    Recent unexplained weight loss    Sacroiliitis ([address] )    Tremor    Urinary incontinence    Vitamin D deficiency    Post laminectomy syndrome    Encounter for long-term use of opiate analgesic    Family history of colorectal cancer    Morbid obesity with BMI of 40 0-44 9, adult ([address] )    Complaint related to dreams    MIMI (obstructive sleep apnea)    Tardive dyskinesia    Primary osteoarthritis of both knees    Patellofemoral disorder of both knees    Rash    Superficial bacterial infection of skin    Scar of back    Impaired skin integrity associated with surgical incision    Status post insertion of spinal cord stimulator    Ambulatory dysfunction    Dysphagia    Arthritis of right knee    Multiple closed fractures of metatarsal bone of right foot    Chronic back pain    Nausea    Encephalopathy    History of CVA (cerebrovascular accident)    Medical clearance for psychiatric admission    Mixed hyperlipidemia    Leukopenia       Past Medical History  Past Medical History:   Diagnosis Date    Acid reflux     Anxiety     RESOLVED: [id number]    Arthritis     Bipolar 2 disorder (HCC)     FOLLOWS WITH PSYCHIATRIST  CONTINUE LAMOTRIGINE; RESOLVED: [id number]    Depression     Familial tremor     both hands    Fibromyalgia     LAST ASSESSED: [id number]    Hearing aid worn     left ear    [place] (hard of hearing)     left ear    Hypertension     Left-sided weakness     Lower back pain     Memory loss of unknown cause     long and short term    Migraine     Obesity     Obesity, Class II, BMI 35-39 9     Overactive bladder     Panic attack     Post traumatic stress disorder     Seasonal allergies     Stroke [facility])     questionable stroke 2009    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    CONT ASA 81 MG DAILY; RESOLVED: [id number]    Urinary incontinence     Wears dentures     partial lower / full upper    Wears glasses        Past Surgical History  Past Surgical History:   Procedure Laterality Date    BACK SURGERY       SECTION      COLONOSCOPY      RESOLVED: [id number]    EAR SURGERY      EGD      HYSTERECTOMY      MYRINGOTOMY W/ TUBES Left     NECK SURGERY  2019    TN CYSTOURETHROSCOPY N/A 2016    Procedure: CYSTOSCOPY, BOTOX INJECTION;  Surgeon: Inga Burroughs MD;  Location: [facility];  Service: Gynecology    TN IMPLANT SPINAL NEUROSTIM/ Right 2/10/2021    Procedure: REPLACEMENT IMPLANTABLE PULSE GENERATOR DORSAL SPINAL COLUMN STIMULATOR, RIGHT;  Surgeon: Luz Lua MD;  Location:  MAIN OR;  Service: Neurosurgery    TN PERCUT IMPLNT NEUROELECT,EPIDURAL Right 2020    Procedure: INSERTION THORACIC DORSAL COLUMN SPINAL CORD STIMULATOR PERCUTANEOUS W IMPLANTABLE PULSE GENERATOR, RIGHT;  Surgeon: Adeola Sumner MD;  Location:  MAIN OR;  Service: Neurosurgery    [facility]    UPPER GASTROINTESTINAL ENDOSCOPY  09/2020       Recent Imaging  XR foot 3+ vw right   Final Result by Giuliano Escalante DO (07/17 1241)      No acute osseous abnormality  Unchanged fractures of the 2nd through 4th metatarsals as above  Moderate hallux valgus  Workstation performed: [id number]             Recent Vital Signs  Vitals:    07/18/21 1317 07/18/21 1418 07/18/21 2059 07/19/21 0624   BP: 121/57 112/69 114/82 112/61   BP Location: Right arm Right arm Left arm Left arm   Pulse: 77 74 68 65   Resp: 18 18 18 16   Temp: 98 °F (36 7 °C) 97 7 °F (36 5 °C) (!) 97 °F (36 1 °C) (!) 97 2 °F (36 2 °C)   TempSrc: Temporal Temporal Temporal Temporal   SpO2:  100% 97% 99%   Weight: 91 6 kg (202 lb)      Height: 5' 1" (1 549 m)           07/16/21 1300   PT Last Visit   PT Visit Date 07/16/21   Note Type   Note type Evaluation   Pain Assessment   Pain Assessment Tool 0-10   Pain Score 2   Pain Location/Orientation Location: Back   Home Living   Type of [address] One level   Additional Comments Pt lives with her son in a one story apartment with 2 SKY  Pt was I for ADL's and requried A with IADL's  Pt uses a rollator at baseline  Per patient son is home on leave but will be returning to work on 27th on July     Prior Function   Level of [place] Independent with ADLs and functional mobility   Lives With Son   Receives Help From Family   ADL Assistance Independent   IADLs Needs assistance   Falls in the last 6 months 0   Vocational On disability   Restrictions/Precautions   Weight Bearing Precautions Per Order No   Other Precautions Pain   General   Family/Caregiver Present No   Cognition   Overall Cognitive Status WFL   Arousal/Participation Alert   Orientation Level Oriented X4   Memory Within functional limits   Following Commands Follows all commands and directions without difficulty   Strength RLE   RLE Overall Strength 3+/5   Strength LLE   LLE Overall Strength 3+/5   Coordination   Movements are Fluid and Coordinated 0   Coordination and Movement Description stiff due to back pain   Light Touch   RLE Light Touch Impaired   RLE Light Touch Comments tingling   LLE Light Touch Impaired   LLE Light Touch Comments tingling   Bed Mobility   Supine to Sit 6  Modified independent   Additional items Increased time required   Sit to Supine 6  Modified independent   Additional items Increased time required   Transfers   Sit to Stand 6  Modified independent   Additional items Armrests; Increased time required   Stand to Sit 6  Modified independent   Additional items Armrests; Increased time required   Additional Comments with RW   Ambulation/Elevation   Gait pattern Excessively slow; Short stride;Decreased foot clearance; Wide CALLY; Forward Flexion   Gait Assistance 6  Modified independent   Additional items Verbal cues   Assistive Device Rolling walker   Distance 50ftx2   Balance   Static Sitting Fair +   Dynamic Sitting Fair   Static Standing Fair   Dynamic Standing Fair -   Ambulatory Fair -   Endurance Deficit   Endurance Deficit Yes   Endurance Deficit Description LBP   Activity Tolerance   Activity Tolerance Patient limited by pain   Medical Staff Made Aware spoke to CM   Nurse Made Aware spoke to RN   Assessment   Prognosis Good   Problem List Decreased strength;Decreased range of motion;Decreased endurance; Impaired balance;Decreased mobility; Decreased coordination;Pain; Impaired sensation;Obesity   Barriers to Discharge Inaccessible home environment   Goals   Patient Goals to go home when better   Plan   PT Frequency One time visit   Recommendation   PT Discharge Recommendation Home with home health rehabilitation   [place] walker   [address] in Bed Without Bedrails 4   Lying on Back to Sitting on Edge of Flat Bed 4   Moving Bed to Chair 4   Standing Up From Chair 4   Walk in Room 4   Climb 3-5 Stairs 3   Basic Mobility Inpatient Raw Score 23   Basic Mobility Standardized Score 50 88         ASSESSMENT                                                                                                                     Samantha Méndez is a 60 y o  female admitted to [facility] on 7/15/2021 for Bipolar II disorder ([facility] [address] )  Pt  has a past medical history of Acid reflux, Anxiety, Arthritis, Bipolar 2 disorder ([facility] [address] ), Depression, Familial tremor, Fibromyalgia, Hearing aid worn, [place] (hard of hearing), Hypertension, Left-sided weakness, Lower back pain, Memory loss of unknown cause, Migraine, Obesity, Obesity, Class II, BMI 35-39 9, Overactive bladder, Panic attack, Post traumatic stress disorder, Seasonal allergies, Stroke ([facility] [address] ), Thrombosis of cerebral arteries, Urinary incontinence, Wears dentures, and Wears glasses    PT was consulted and pt was seen on 7/19/2021 for mobility assessment and d/c planning  Pt presents supine in bed alert and agreeable to therapy  She reports mild pain in the lower back which she says has improved recently but overall chronic  She reports tingling sensation in BLE which is baseline grossly intact to light touch  Weakness present in BLE although ok for functional activities  Endurance is limited by fatigue and pain  Balance is fair with RW  Pt is currently functioning at a modified independent assistance level for bed mobility, modified independent assistance level for transfers, modified independent assistance level for ambulation with Rolling Walker  The patient's AM-PAC Basic Mobility Inpatient Short Form Raw Score is 23, Standardized Score is 50  88  A standardized score greater than 42 9 suggests the patient may benefit from discharge to home  Please also refer to the recommendation of the Physical Therapist for safe discharge planning      Recommendations DME:  rolling walker    Discharge Disposition:  Home with [facility] PT, DPT

## 2021-07-19 NOTE — NURSING NOTE
Patient is again requesting PRN medication for what she says is a "panic attack" and she says she "can't function"  When asked to clarify what she means she just says to call the doctor  Chaim Text sent to Dr Harshil Johnston who is the off hours and off site provider to review her current medications and PRN medications  Awaiting a response at this time

## 2021-07-19 NOTE — NURSING NOTE
Pt observed in the milieu ambulating with a walker  Appears to be fixated on Ativan  Offered no concerns thus far

## 2021-07-19 NOTE — NURSING NOTE
Patient was medication and meal compliant  Appetite is good with patient eating 100% of breakfast   No complaints of pain or discomfort  Patient requested PRN Ativan for anxiety  Patient notified that this medication was discontinued and she was offered a PRN Atarax instead  Patient was not happy about it but did accept PRN Atarax at 0926 which was effective and no further complaint of anxiety  Patient reports depression at 5/10 and anxiety at 4/4  Patient is isolative to self and does not really interact with peers although she is visible on the unit sitting by herself in the dining room  Patient denies suicidal ideation and no attempts at self harm noted  Will continue to monitor patient for changes in mood or behavior

## 2021-07-19 NOTE — NURSING NOTE
Patient continue to yell at the nursing station with PO Zyprexa not effective  Patient escorted to her room and assisted into bed  Patient continues to pant and moan and patient encouraged to lay down in her room

## 2021-07-19 NOTE — SOCIAL WORK
Met with pt for therapuetic intervention and monitoring  Pt was lying in bed and did not attend group  Pt stated she was having panic attacks and anxiety  Pt was aware of medication adjustments and hopeful  Pt needed to use the bathroom  Pt expressed concerns about lighting in bathroom  Encouraged pt to attend groups and make needs known

## 2021-07-19 NOTE — PLAN OF CARE
Problem: Alteration in Thoughts and Perception  Goal: Treatment Goal: Gain control of psychotic behaviors/thinking, reduce/eliminate presenting symptoms and demonstrate improved reality functioning upon discharge  Outcome: Progressing  Goal: Verbalize thoughts and feelings  Description: Interventions:  - Promote a nonjudgmental and trusting relationship with the patient through active listening and therapeutic communication  - Assess patient's level of functioning, behavior and potential for risk  - Engage patient in 1 on 1 interactions  - Encourage patient to express fears, feelings, frustrations, and discuss symptoms    - [place] patient to reality, help patient recognize reality-based thinking   - Administer medications as ordered and assess for potential side effects  - Provide the patient education related to the signs and symptoms of the illness and desired effects of prescribed medications  Outcome: Progressing  Goal: Refrain from acting on delusional thinking/internal stimuli  Description: Interventions:  - Monitor patient closely, per order   - Utilize least restrictive measures   - Set reasonable limits, give positive feedback for acceptable   - Administer medications as ordered and monitor of potential side effects  Outcome: Progressing  Goal: Agree to be compliant with medication regime, as prescribed and report medication side effects  Description: Interventions:  - Offer appropriate PRN medication and supervise ingestion; conduct AIMS, as needed   Outcome: Progressing  Goal: Attend and participate in unit activities, including therapeutic, recreational, and educational groups  Description: Interventions:  -Encourage Visitation and family involvement in care  Outcome: Not Progressing  Goal: Recognize dysfunctional thoughts, communicate reality-based thoughts at the time of discharge  Description: Interventions:  - Provide medication and psycho-education to assist patient in compliance and developing insight into his/her illness   Outcome: Progressing  Goal: Complete daily ADLs, including personal hygiene independently, as able  Description: Interventions:  - Observe, teach, and assist patient with ADLS  - Monitor and promote a balance of rest/activity, with adequate nutrition and elimination   Outcome: Progressing

## 2021-07-19 NOTE — QUICK NOTE
Patient was not physically seen today, however a thorough chart review was performed including review of vitals and new lab results       Leukopenia  Assessment & Plan  · CBC 7/15/2021 revealed WBC 3 08, ANC 1 99; hgb 14 3, platelets 588  · CBC 7/14/2021:  WBCs 3 88  · CBC 7/11/2021:  WBC 6 54, ANC 5 24  · Chart review reveals patient's WBCs tend to occasionally trend low: avg 3 5-5 5; occasionally higher around 6-8  · ANC also tends to occasionally trend low (avg 2-4)  · VSS, patient afebrile  · Etiology unclear- potentially nutritional vs infection (viral) vs medication side effect    **7/18**  · CBCd 7/18/2021:  WBCs 4 0 (increased from 3 08), ANC 1 7 (decreased from 1 99)  · Hemoglobin 12 6, platelets 147  · Vital signs remain stable, patient afebrile  · Will continue to monitor, repeat CBCd 7/20/2021

## 2021-07-19 NOTE — NURSING NOTE
Patient is sitting calmly at the nursing station awaiting a "doctor" to come in to see her despite explaining that we are in contact with the provider and we are waiting for orders  Dr Deepti Lawrence reviewed orders with changes noted to medication profile

## 2021-07-19 NOTE — PLAN OF CARE
Pt did not attend any groups when prompted or offered       Problem: Alteration in Thoughts and Perception  Goal: Attend and participate in unit activities, including therapeutic, recreational, and educational groups  Description: Interventions:  -Encourage Visitation and family involvement in care  Outcome: Not Progressing

## 2021-07-19 NOTE — PROGRESS NOTES
Progress Note - Behavioral Health   Samantha D Aleatha Spurling 60 y o  female MRN: [id number]  Unit/Bed#: [facility] 164-26 Encounter: [id number]    Assessment/Plan   Principal Problem:    Bipolar II disorder ([address] )  Active Problems:    Chronic bilateral low back pain with bilateral sciatica    Chronic pain syndrome    Essential hypertension    History of hypokalemia    Opioid dependence (HCC)    Panic disorder without agoraphobia    Post traumatic stress disorder    Tardive dyskinesia    Primary osteoarthritis of both knees    Multiple closed fractures of metatarsal bone of right foot    History of CVA (cerebrovascular accident)    Medical clearance for psychiatric admission    Mixed hyperlipidemia    Leukopenia  Patient continues to report significant anxiety and panic symptoms which is said the may be little better but not the gone by any means  Use p r n  Atarax and Ativan throughout the day and seems to be always interested in more to help her anxiety and panic  She has tolerated thus far the initial doses of Paxil      Behavior over the last 24 hours:  unchanged  Sleep: normal  Appetite: normal  Medication side effects: No  ROS: no complaints    Mental Status Evaluation:  Appearance:  age appropriate   Behavior:  guarded   Speech:  normal pitch and normal volume   Mood:  anxious and depressed   Affect:  blunted and constricted   Thought Process:  tangential   Thought Content:  normal   Perceptual Disturbances: None   Risk Potential: Suicidal Ideations none  Homicidal Ideations none  Potential for Aggression No   Sensorium:  person, place, and situation   Memory:  recent and remote memory grossly intact   Consciousness:  alert and awake    Attention: attention span appeared shorter than expected for age   Insight:  fair   Judgment: fair   Gait/Station: normal gait/station   Motor Activity: no abnormal movements     Progress Toward Goals:  Little improvement    Recommended Treatment: Continue with group therapy, milieu therapy and occupational therapy  Risks, benefits and possible side effects of Medications:   Risks, benefits, and possible side effects of medications explained to patient and patient verbalizes understanding  Medications: planned medication changes: Paxil increased to 30 mg  Labs: I have personally reviewed all pertinent laboratory/tests results  Most Recent Labs:   Lab Results   Component Value Date    WBC 4 00 (L) 07/18/2021    RBC 4 24 07/18/2021    HGB 12 6 07/18/2021    HCT 37 9 07/18/2021     07/18/2021    RDW 14 8 07/18/2021    NEUTROABS 1 70 (L) 07/18/2021    SODIUM 140 07/17/2021    K 4 0 07/17/2021     07/17/2021    CO2 25 07/17/2021    BUN 11 07/17/2021    CREATININE 0 63 07/17/2021    GLUC 96 07/17/2021    GLUF 96 07/17/2021    CALCIUM 8 9 07/17/2021    AST 12 07/11/2021    ALT 18 07/11/2021    ALKPHOS 104 07/11/2021    TP 7 8 07/11/2021    ALB 3 7 07/11/2021    TBILI 0 67 07/11/2021    CHOLESTEROL 215 (H) 02/13/2020    HDL 84 02/13/2020    TRIG 74 02/13/2020    LDLCALC 116 (H) 02/13/2020    NONHDLC 131 02/13/2020    AMMONIA <10 (L) 06/27/2021    [id number] 2 060 06/30/2021    FREET4 0 80 02/13/2020    PREGSERUM Negative 02/22/2014    RPR Non-Reactive 06/29/2021    HGBA1C 4 8 02/08/2021    EAG 91 02/08/2021       Counseling / Coordination of Care  Total floor / unit time spent today 25 minutes  Greater than 50% of total time was spent with the patient and / or family counseling and / or coordination of care   A description of the counseling / coordination of care:

## 2021-07-19 NOTE — NURSING NOTE
Patient complained of anxiety and was fearful of having a "panic attack"  Patient requested medication to assist with same  PRN Atarax given at 21  with decrease in slight decrease in anxiety noted at this time  Will continue to monitor patient for changes in mood or behavior

## 2021-07-19 NOTE — PROGRESS NOTES
07/19/21 0835   Team Meeting   Meeting Type Daily Rounds   Team Members Present   Team Members Present Physician;Nurse;   Physician Team Member Gross   Nursing Team Member [facility] Management Team Member [place]   Patient/Family Present   Patient Present No   Patient's Family Present No     Patient reports depression and anxiety  She isolates  She is compliant with medications  Medication changes  SW will contact patient's OP providers

## 2021-07-20 LAB
BACTERIA UR QL AUTO: NORMAL /HPF
BASOPHILS # BLD AUTO: 0.1 THOUSANDS/ΜL (ref 0–0.1)
BASOPHILS NFR BLD AUTO: 1 % (ref 0–1)
BILIRUB UR QL STRIP: NEGATIVE
CLARITY UR: CLEAR
COLOR UR: ABNORMAL
EOSINOPHIL # BLD AUTO: 0.1 THOUSAND/ΜL (ref 0–0.4)
EOSINOPHIL NFR BLD AUTO: 1 % (ref 0–6)
ERYTHROCYTE [DISTWIDTH] IN BLOOD BY AUTOMATED COUNT: 14.8 %
GLUCOSE UR STRIP-MCNC: NEGATIVE MG/DL
HCT VFR BLD AUTO: 38.3 % (ref 36–46)
HGB BLD-MCNC: 12.7 G/DL (ref 12–16)
HGB UR QL STRIP.AUTO: NEGATIVE
KETONES UR STRIP-MCNC: ABNORMAL MG/DL
LEUKOCYTE ESTERASE UR QL STRIP: 25
LYMPHOCYTES # BLD AUTO: 1.7 THOUSANDS/ΜL (ref 0.5–4)
LYMPHOCYTES NFR BLD AUTO: 28 % (ref 25–45)
MCH RBC QN AUTO: 30 PG (ref 26–34)
MCHC RBC AUTO-ENTMCNC: 33.3 G/DL (ref 31–36)
MCV RBC AUTO: 90 FL (ref 80–100)
MONOCYTES # BLD AUTO: 0.4 THOUSAND/ΜL (ref 0.2–0.9)
MONOCYTES NFR BLD AUTO: 7 % (ref 1–10)
NEUTROPHILS # BLD AUTO: 3.8 THOUSANDS/ΜL (ref 1.8–7.8)
NEUTS SEG NFR BLD AUTO: 62 % (ref 45–65)
NITRITE UR QL STRIP: NEGATIVE
NON-SQ EPI CELLS URNS QL MICRO: NORMAL /HPF
PH UR STRIP.AUTO: 7 [PH]
PLATELET # BLD AUTO: 301 THOUSANDS/UL (ref 150–450)
PMV BLD AUTO: 7.3 FL (ref 8.9–12.7)
PROT UR STRIP-MCNC: NEGATIVE MG/DL
RBC # BLD AUTO: 4.25 MILLION/UL (ref 4–5.2)
RBC #/AREA URNS AUTO: NORMAL /HPF
SP GR UR STRIP.AUTO: 1 (ref 1–1.04)
UROBILINOGEN UA: NEGATIVE MG/DL
WBC # BLD AUTO: 6.1 THOUSAND/UL (ref 4.5–11)
WBC #/AREA URNS AUTO: NORMAL /HPF

## 2021-07-20 PROCEDURE — 85025 COMPLETE CBC W/AUTO DIFF WBC: CPT | Performed by: PHYSICIAN ASSISTANT

## 2021-07-20 PROCEDURE — 81003 URINALYSIS AUTO W/O SCOPE: CPT | Performed by: PHYSICIAN ASSISTANT

## 2021-07-20 PROCEDURE — 81001 URINALYSIS AUTO W/SCOPE: CPT | Performed by: PHYSICIAN ASSISTANT

## 2021-07-20 RX ORDER — QUETIAPINE FUMARATE 50 MG/1
50 TABLET, FILM COATED ORAL 3 TIMES DAILY
Status: DISCONTINUED | OUTPATIENT
Start: 2021-07-20 | End: 2021-07-23

## 2021-07-20 RX ADMIN — QUETIAPINE FUMARATE 100 MG: 100 TABLET ORAL at 21:23

## 2021-07-20 RX ADMIN — MORPHINE SULFATE 15 MG: 15 TABLET, EXTENDED RELEASE ORAL at 21:23

## 2021-07-20 RX ADMIN — QUETIAPINE FUMARATE 50 MG: 50 TABLET ORAL at 21:23

## 2021-07-20 RX ADMIN — PHENYTOIN 2 MG: 125 SUSPENSION ORAL at 08:11

## 2021-07-20 RX ADMIN — HYDROXYZINE HYDROCHLORIDE 50 MG: 50 TABLET, FILM COATED ORAL at 08:09

## 2021-07-20 RX ADMIN — PANTOPRAZOLE SODIUM 20 MG: 20 TABLET, DELAYED RELEASE ORAL at 05:39

## 2021-07-20 RX ADMIN — BUSPIRONE HYDROCHLORIDE 30 MG: 15 TABLET ORAL at 08:08

## 2021-07-20 RX ADMIN — HYDROXYZINE HYDROCHLORIDE 50 MG: 50 TABLET, FILM COATED ORAL at 21:23

## 2021-07-20 RX ADMIN — PAROXETINE HYDROCHLORIDE 30 MG: 20 TABLET, FILM COATED ORAL at 08:10

## 2021-07-20 RX ADMIN — FOLIC ACID 1000 MCG: 1 TABLET ORAL at 08:09

## 2021-07-20 RX ADMIN — VALBENAZINE 80 MG: 80 CAPSULE ORAL at 08:12

## 2021-07-20 RX ADMIN — HYDROXYZINE HYDROCHLORIDE 50 MG: 50 TABLET, FILM COATED ORAL at 15:53

## 2021-07-20 RX ADMIN — BUSPIRONE HYDROCHLORIDE 30 MG: 15 TABLET ORAL at 15:53

## 2021-07-20 RX ADMIN — ACETAMINOPHEN 975 MG: 325 TABLET ORAL at 05:39

## 2021-07-20 RX ADMIN — AMLODIPINE BESYLATE 5 MG: 5 TABLET ORAL at 08:08

## 2021-07-20 RX ADMIN — DICLOFENAC SODIUM 2 G: 10 GEL TOPICAL at 08:14

## 2021-07-20 RX ADMIN — POTASSIUM CHLORIDE 20 MEQ: 20 TABLET, EXTENDED RELEASE ORAL at 08:11

## 2021-07-20 RX ADMIN — ATORVASTATIN CALCIUM 40 MG: 40 TABLET, FILM COATED ORAL at 15:53

## 2021-07-20 RX ADMIN — ACETAMINOPHEN 975 MG: 325 TABLET ORAL at 13:48

## 2021-07-20 RX ADMIN — ENOXAPARIN SODIUM 40 MG: 40 INJECTION SUBCUTANEOUS at 09:52

## 2021-07-20 RX ADMIN — DICLOFENAC SODIUM 2 G: 10 GEL TOPICAL at 12:35

## 2021-07-20 RX ADMIN — ACETAMINOPHEN 975 MG: 325 TABLET ORAL at 21:22

## 2021-07-20 RX ADMIN — PREGABALIN 100 MG: 50 CAPSULE ORAL at 17:16

## 2021-07-20 RX ADMIN — PREGABALIN 100 MG: 50 CAPSULE ORAL at 08:10

## 2021-07-20 RX ADMIN — QUETIAPINE FUMARATE 50 MG: 50 TABLET ORAL at 15:53

## 2021-07-20 RX ADMIN — ASPIRIN 81 MG CHEWABLE TABLET 81 MG: 81 TABLET CHEWABLE at 08:08

## 2021-07-20 RX ADMIN — DICLOFENAC SODIUM 2 G: 10 GEL TOPICAL at 21:26

## 2021-07-20 RX ADMIN — BUSPIRONE HYDROCHLORIDE 30 MG: 15 TABLET ORAL at 21:22

## 2021-07-20 RX ADMIN — QUETIAPINE FUMARATE 50 MG: 50 TABLET ORAL at 08:11

## 2021-07-20 RX ADMIN — DICLOFENAC SODIUM 2 G: 10 GEL TOPICAL at 17:16

## 2021-07-20 RX ADMIN — MORPHINE SULFATE 15 MG: 15 TABLET, EXTENDED RELEASE ORAL at 08:09

## 2021-07-20 NOTE — NURSING NOTE
Patient is present in the halls at start of shift  Pt asking for medications frequently since 7am, informed of medication administration times  Pt cooperative with med pass  Pt reported depression rated 7/10 and anxiety rated 4/4  Pt is currently denying all other s/s at this time  Around 10am, pt in bed yelling for RN  Upon assessment, pt yelling, "I am numb! I am in so much pain! I need help! I cannot move!"  Pt in bed, actively moving self in bed  Informed of scheduled medications and educated  Pt screaming out, moaning in bed  Consistently yelling for a nurse  Pt stating she needs to go to the bathroom, refusing to get up and walk with staff's assistance  Pt stating she is unable to walk, able to walk with walker in juan with out any assistance  Pt had refused to come out for lunch after using restroom, stating "I cannot go, I cannot walk " and then walked to bed with no issues noted  Pt then came out for lunch  Pt dragging chair down hallway to sit at nurse's station desk, moving chair with out difficulty  Pt stated to not move the chair and to use walker  Informed that the chair cannot be moved next to the desk because of patient confidentiality  Pt becoming annoyed and walking away from nurse's station  Pt consistently asking for her scheduled medications before the due times  Medications given to patient when scheduled  Pt minimally visible throughout late afternoon, seclusive to room after dinnertime  No signs of distress noted at this time  Will continue to monitor frequently

## 2021-07-20 NOTE — PROGRESS NOTES
Progress Note - Behavioral Health   Samantha QUINOENZ Ashley Pritchard 60 y o  female MRN: [id number]  Unit/Bed#: [facility] 999-65 Encounter: [id number]    Assessment/Plan   Principal Problem:    Bipolar II disorder ([address] )  Active Problems:    Chronic bilateral low back pain with bilateral sciatica    Chronic pain syndrome    Essential hypertension    History of hypokalemia    Opioid dependence (HCC)    Panic disorder without agoraphobia    Post traumatic stress disorder    Tardive dyskinesia    Primary osteoarthritis of both knees    Multiple closed fractures of metatarsal bone of right foot    History of CVA (cerebrovascular accident)    Medical clearance for psychiatric admission    Mixed hyperlipidemia    Leukopenia    Patient is quite anxious today and complain about severe panic attacks and requesting medications frequently for relief of same  Upset that the Ativan was stopped and she continues report any that I need that is quite dysphoric and the easily agitated    Behavior over the last 24 hours:  unchanged  Sleep: normal  Appetite: normal  Medication side effects: No  ROS: no complaints    Mental Status Evaluation:  Appearance:  age appropriate   Behavior:  guarded, med seeking and complaining about high anxiety   Speech:  normal pitch and normal volume   Mood:  anxious and depressed   Affect:  blunted and constricted   Thought Process:  tangential   Thought Content:  Very somatic   Perceptual Disturbances: None   Risk Potential: Suicidal Ideations none  Homicidal Ideations none  Potential for Aggression No   Sensorium:  person, place, and situation   Memory:  recent and remote memory grossly intact   Consciousness:  alert and awake    Attention: attention span appeared shorter than expected for age   Insight:  fair   Judgment: fair   Gait/Station: normal gait/station   Motor Activity: no abnormal movements     Progress Toward Goals:  Not much change    Recommended Treatment: Continue with group therapy, milieu therapy and occupational therapy  Risks, benefits and possible side effects of Medications:   Risks, benefits, and possible side effects of medications explained to patient and patient verbalizes understanding  Medications: planned medication changes: Increase Seroquel to 50 mg t i d  And 100 mg HS  Labs: I have personally reviewed all pertinent laboratory/tests results  Most Recent Labs:   Lab Results   Component Value Date    WBC 6 10 07/20/2021    RBC 4 25 07/20/2021    HGB 12 7 07/20/2021    HCT 38 3 07/20/2021     07/20/2021    RDW 14 8 07/20/2021    NEUTROABS 3 80 07/20/2021    SODIUM 140 07/17/2021    K 4 0 07/17/2021     07/17/2021    CO2 25 07/17/2021    BUN 11 07/17/2021    CREATININE 0 63 07/17/2021    GLUC 96 07/17/2021    GLUF 96 07/17/2021    CALCIUM 8 9 07/17/2021    AST 12 07/11/2021    ALT 18 07/11/2021    ALKPHOS 104 07/11/2021    TP 7 8 07/11/2021    ALB 3 7 07/11/2021    TBILI 0 67 07/11/2021    CHOLESTEROL 215 (H) 02/13/2020    HDL 84 02/13/2020    TRIG 74 02/13/2020    LDLCALC 116 (H) 02/13/2020    NONHDLC 131 02/13/2020    AMMONIA <10 (L) 06/27/2021    [id number] 2 060 06/30/2021    FREET4 0 80 02/13/2020    PREGSERUM Negative 02/22/2014    RPR Non-Reactive 06/29/2021    HGBA1C 4 8 02/08/2021    EAG 91 02/08/2021       Counseling / Coordination of Care  Total floor / unit time spent today 25 minutes  Greater than 50% of total time was spent with the patient and / or family counseling and / or coordination of care   A description of the counseling / coordination of care:

## 2021-07-20 NOTE — PROGRESS NOTES
Pt attended last 10 minutes of afternoon group  Pt bright affect and fair eye contact  Pt expressed interest in the times of groups  Earlier in the day attempted to speak with pt 1:1 for therapeutic intervention and provide mindfulness and deep breathing techniques  Pt anxious, lying in bed and breathing rapidly stating she needed assistance to the bathroom  Alerted nursing of pt behaviors         07/20/21 1330   Activity/Group Checklist   Group Other (Comment)  (positive reflection)   Attendance Other (Comment)  (late last 10 minutes)

## 2021-07-20 NOTE — PLAN OF CARE
Problem: Alteration in Thoughts and Perception  Goal: Treatment Goal: Gain control of psychotic behaviors/thinking, reduce/eliminate presenting symptoms and demonstrate improved reality functioning upon discharge  Outcome: Progressing  Goal: Verbalize thoughts and feelings  Description: Interventions:  - Promote a nonjudgmental and trusting relationship with the patient through active listening and therapeutic communication  - Assess patient's level of functioning, behavior and potential for risk  - Engage patient in 1 on 1 interactions  - Encourage patient to express fears, feelings, frustrations, and discuss symptoms    - [place] patient to reality, help patient recognize reality-based thinking   - Administer medications as ordered and assess for potential side effects  - Provide the patient education related to the signs and symptoms of the illness and desired effects of prescribed medications  Outcome: Progressing  Goal: Refrain from acting on delusional thinking/internal stimuli  Description: Interventions:  - Monitor patient closely, per order   - Utilize least restrictive measures   - Set reasonable limits, give positive feedback for acceptable   - Administer medications as ordered and monitor of potential side effects  Outcome: Progressing  Goal: Agree to be compliant with medication regime, as prescribed and report medication side effects  Description: Interventions:  - Offer appropriate PRN medication and supervise ingestion; conduct AIMS, as needed   Outcome: Progressing  Goal: Recognize dysfunctional thoughts, communicate reality-based thoughts at the time of discharge  Description: Interventions:  - Provide medication and psycho-education to assist patient in compliance and developing insight into his/her illness   Outcome: Progressing  Goal: Complete daily ADLs, including personal hygiene independently, as able  Description: Interventions:  - Observe, teach, and assist patient with ADLS  - Monitor and promote a balance of rest/activity, with adequate nutrition and elimination   Outcome: Progressing     Problem: Ineffective Coping  Goal: Identifies ineffective coping skills  Outcome: Progressing  Goal: Identifies healthy coping skills  Outcome: Progressing  Goal: Demonstrates healthy coping skills  Outcome: Progressing  Goal: Patient/Family participate in treatment and DC plans  Description: Interventions:  - Provide therapeutic environment  Outcome: Progressing  Goal: Patient/Family verbalizes awareness of resources  Outcome: Progressing     Problem: Anxiety  Goal: Anxiety is at manageable level  Description: Interventions:  - Assess and monitor patient's anxiety level  - Monitor for signs and symptoms (heart palpitations, chest pain, shortness of breath, headaches, nausea, feeling jumpy, restlessness, irritable, apprehensive)  - Collaborate with interdisciplinary team and initiate plan and interventions as ordered  - [place] patient to unit/surroundings  - Explain treatment plan  - Encourage participation in care  - Encourage verbalization of concerns/fears  - Identify coping mechanisms  - Assist in developing anxiety-reducing skills  - Administer/offer alternative therapies  - Limit or eliminate stimulants  Outcome: Progressing     Problem: BEHAVIOR  Goal: Pt/Family maintain appropriate behavior and adhere to behavioral management agreement, if implemented  Description: INTERVENTIONS:  - Assess the family dynamic   - Encourage verbalization of thoughts and concerns in a socially appropriate manner  - Assess patient/family's coping skills and non-compliant behavior (including use of illegal substances)    - Utilize positive, consistent limit setting strategies supporting safety of patient, staff and others  - Initiate consult with Case Management, Spiritual Care or other ancillary services as appropriate  - If a patient's/visitor's behavior jeopardizes the safety of the patient, staff, or others, refer to organization procedure     - Notify Security of behavior or suspected illegal substances which indicate the need for search of the patient and/or belongings  - Encourage participation in the decision making process about a behavioral management agreement; implement if patient meets criteria  Outcome: Progressing     Problem: ANXIETY  Goal: Will report anxiety at manageable levels  Description: INTERVENTIONS:  - Administer medication as ordered  - Teach and encourage coping skills  - Provide emotional support  - Assess patient/family for anxiety and ability to cope  Outcome: Progressing  Goal: By discharge: Patient will verbalize 2 strategies to deal with anxiety  Description: Interventions:  - Identify any obvious source/trigger to anxiety  - Staff will assist patient in applying identified coping technique/skills  - Encourage attendance of scheduled groups and activities  Outcome: Progressing     Problem: PAIN - ADULT  Goal: Verbalizes/displays adequate comfort level or baseline comfort level  Description: Interventions:  - Encourage patient to monitor pain and request assistance  - Assess pain using appropriate pain scale  - Administer analgesics based on type and severity of pain and evaluate response  - Implement non-pharmacological measures as appropriate and evaluate response  - Consider cultural and social influences on pain and pain management  - Notify physician/advanced practitioner if interventions unsuccessful or patient reports new pain  Outcome: Progressing     Problem: Alteration in Thoughts and Perception  Goal: Attend and participate in unit activities, including therapeutic, recreational, and educational groups  Description: Interventions:  -Encourage Visitation and family involvement in care  Outcome: Not Progressing     Problem: Ineffective Coping  Goal: Participates in unit activities  Description: Interventions:  - Provide therapeutic environment   - Provide required programming   - Redirect inappropriate behaviors   Outcome: Not Progressing

## 2021-07-20 NOTE — PROGRESS NOTES
07/20/21 0837   Team Meeting   Meeting Type Daily Rounds   Initial Conference Date 07/20/21   Team Members Present   Team Members Present Physician;Nurse;;; Occupational Therapist   Physician Team Member Gross   Nursing Team Member [facility] Management Team Member [address] [id number]   Social Work Team Member Hunter Villavicencio   OT Team Member Viola   Patient/Family Present   Patient Present No   Patient's Family Present No     Ativan is discontinued  Patient is upset  She is fixated on medications  She continues to have anxiety and depression  She has limited insight  Yelling at staff  Medication changes to continue

## 2021-07-20 NOTE — TREATMENT TEAM
07/20/21 1000   Service   Service SLIM   Provider Name [facility]    Multi-disciplinary Rounds   Elimination/Bowel Regimen Reviewed   Pt stated she is experiencing urinary frequency  Pt states she has a hx of overactive bladder, states she cannot take ditropan because of memory issues  Provider made aware  UA ordered

## 2021-07-20 NOTE — NURSING NOTE
Pt continues with moaning and heperventilating  Pt is panting and endorsing an ongoing panic attack for the past 3 hours  Pt appears to be somatic and continues to seek for medication and asking for Ativan  Pt was educated in her med regimen and was told that all 2100 scheduled meds will be given as early as 2000  Pt appeared calm, stopped panting and hyperventilating and voiced out that she is trying take control of herself  Bx improved immediately upon admin of med at 2000

## 2021-07-21 RX ORDER — PAROXETINE HYDROCHLORIDE 20 MG/1
40 TABLET, FILM COATED ORAL DAILY
Status: DISCONTINUED | OUTPATIENT
Start: 2021-07-21 | End: 2021-08-02

## 2021-07-21 RX ADMIN — DICLOFENAC SODIUM 2 G: 10 GEL TOPICAL at 08:40

## 2021-07-21 RX ADMIN — AMLODIPINE BESYLATE 5 MG: 5 TABLET ORAL at 08:34

## 2021-07-21 RX ADMIN — PAROXETINE HYDROCHLORIDE 40 MG: 20 TABLET, FILM COATED ORAL at 08:34

## 2021-07-21 RX ADMIN — HYDROXYZINE HYDROCHLORIDE 50 MG: 50 TABLET, FILM COATED ORAL at 21:00

## 2021-07-21 RX ADMIN — ACETAMINOPHEN 975 MG: 325 TABLET ORAL at 05:47

## 2021-07-21 RX ADMIN — PREGABALIN 100 MG: 50 CAPSULE ORAL at 08:35

## 2021-07-21 RX ADMIN — QUETIAPINE FUMARATE 50 MG: 50 TABLET ORAL at 16:27

## 2021-07-21 RX ADMIN — FOLIC ACID 1000 MCG: 1 TABLET ORAL at 08:34

## 2021-07-21 RX ADMIN — PHENYTOIN 2 MG: 125 SUSPENSION ORAL at 08:35

## 2021-07-21 RX ADMIN — MORPHINE SULFATE 15 MG: 15 TABLET, EXTENDED RELEASE ORAL at 08:35

## 2021-07-21 RX ADMIN — ATORVASTATIN CALCIUM 40 MG: 40 TABLET, FILM COATED ORAL at 16:27

## 2021-07-21 RX ADMIN — BUSPIRONE HYDROCHLORIDE 30 MG: 15 TABLET ORAL at 08:34

## 2021-07-21 RX ADMIN — PREGABALIN 100 MG: 50 CAPSULE ORAL at 17:00

## 2021-07-21 RX ADMIN — ASPIRIN 81 MG CHEWABLE TABLET 81 MG: 81 TABLET CHEWABLE at 08:34

## 2021-07-21 RX ADMIN — PANTOPRAZOLE SODIUM 20 MG: 20 TABLET, DELAYED RELEASE ORAL at 05:48

## 2021-07-21 RX ADMIN — QUETIAPINE FUMARATE 50 MG: 50 TABLET ORAL at 08:35

## 2021-07-21 RX ADMIN — ACETAMINOPHEN 975 MG: 325 TABLET ORAL at 20:58

## 2021-07-21 RX ADMIN — DICLOFENAC SODIUM 2 G: 10 GEL TOPICAL at 13:24

## 2021-07-21 RX ADMIN — POTASSIUM CHLORIDE 20 MEQ: 20 TABLET, EXTENDED RELEASE ORAL at 08:35

## 2021-07-21 RX ADMIN — HYDROXYZINE HYDROCHLORIDE 50 MG: 50 TABLET, FILM COATED ORAL at 16:27

## 2021-07-21 RX ADMIN — MORPHINE SULFATE 15 MG: 15 TABLET, EXTENDED RELEASE ORAL at 21:00

## 2021-07-21 RX ADMIN — POLYETHYLENE GLYCOL 3350 17 G: 17 POWDER, FOR SOLUTION ORAL at 17:02

## 2021-07-21 RX ADMIN — BUSPIRONE HYDROCHLORIDE 30 MG: 15 TABLET ORAL at 16:27

## 2021-07-21 RX ADMIN — DICLOFENAC SODIUM 2 G: 10 GEL TOPICAL at 21:01

## 2021-07-21 RX ADMIN — ACETAMINOPHEN 975 MG: 325 TABLET ORAL at 13:25

## 2021-07-21 RX ADMIN — VALBENAZINE 80 MG: 80 CAPSULE ORAL at 08:38

## 2021-07-21 RX ADMIN — HYDROXYZINE HYDROCHLORIDE 50 MG: 50 TABLET, FILM COATED ORAL at 08:34

## 2021-07-21 RX ADMIN — DICLOFENAC SODIUM 2 G: 10 GEL TOPICAL at 17:00

## 2021-07-21 RX ADMIN — QUETIAPINE FUMARATE 100 MG: 100 TABLET ORAL at 21:00

## 2021-07-21 RX ADMIN — BUSPIRONE HYDROCHLORIDE 30 MG: 15 TABLET ORAL at 20:57

## 2021-07-21 RX ADMIN — QUETIAPINE FUMARATE 50 MG: 50 TABLET ORAL at 21:01

## 2021-07-21 NOTE — PROGRESS NOTES
07/21/21 0837   Team Meeting   Meeting Type Daily Rounds   Team Members Present   Team Members Present Physician;Nurse;   Physician Team Member Gross   Nursing Team Member [facility] Management Team Member [place]   Patient/Family Present   Patient Present No   Patient's Family Present No     Patient was calm and cooperative in the morning  Later in the morning she yelled at staff, she was somatic  She reports depression and anxiety  Medication changes

## 2021-07-21 NOTE — PROGRESS NOTES
Progress Note - Behavioral Health   Samantha QUINONEZ Urvashi Escalera 60 y o  female MRN: [id number]  Unit/Bed#: [facility] 093-43 Encounter: [id number]    Assessment/Plan   Principal Problem:    Bipolar II disorder ([address] )  Active Problems:    Chronic bilateral low back pain with bilateral sciatica    Chronic pain syndrome    Essential hypertension    History of hypokalemia    Opioid dependence (HCC)    Panic disorder without agoraphobia    Post traumatic stress disorder    Tardive dyskinesia    Primary osteoarthritis of both knees    Multiple closed fractures of metatarsal bone of right foot    History of CVA (cerebrovascular accident)    Medical clearance for psychiatric admission    Mixed hyperlipidemia    Leukopenia  Patient reports having a very bad day yesterday requiring multiple PRNs and having persistent severe panic and anxiety attacks  At times she seems attention seeking and med seeking and does complain about chronic pain in her back which aggravates her anxiety and other emotional symptoms per    This morning she had planned a back pain and anxiety but she does not seem agitated the during our discussion    Behavior over the last 24 hours:  unchanged  Sleep: normal  Appetite: normal  Medication side effects: No  ROS: no complaints    Mental Status Evaluation:  Appearance:  age appropriate   Behavior:  guarded   Speech:  normal pitch and normal volume   Mood:  anxious and depressed and irritable   Affect:  blunted and constricted at times at other time she is labile   Thought Process:  tangential   Thought Content:  normal   Perceptual Disturbances: None   Risk Potential: Suicidal Ideations none  Homicidal Ideations none  Potential for Aggression No   Sensorium:  person, place, and situation   Memory:  recent and remote memory grossly intact   Consciousness:  alert and awake    Attention: attention span appeared shorter than expected for age   Insight:  fair   Judgment: fair   Gait/Station: Uses a walker   Motor Activity: no abnormal movements     Progress Toward Goals:  Little change    Recommended Treatment: Continue with group therapy, milieu therapy and occupational therapy  Risks, benefits and possible side effects of Medications:   Risks, benefits, and possible side effects of medications explained to patient and patient verbalizes understanding  Medications: planned medication changes: Increase Paxil to 40 mg  Labs: I have personally reviewed all pertinent laboratory/tests results  Most Recent Labs:   Lab Results   Component Value Date    WBC 6 10 07/20/2021    RBC 4 25 07/20/2021    HGB 12 7 07/20/2021    HCT 38 3 07/20/2021     07/20/2021    RDW 14 8 07/20/2021    NEUTROABS 3 80 07/20/2021    SODIUM 140 07/17/2021    K 4 0 07/17/2021     07/17/2021    CO2 25 07/17/2021    BUN 11 07/17/2021    CREATININE 0 63 07/17/2021    GLUC 96 07/17/2021    GLUF 96 07/17/2021    CALCIUM 8 9 07/17/2021    AST 12 07/11/2021    ALT 18 07/11/2021    ALKPHOS 104 07/11/2021    TP 7 8 07/11/2021    ALB 3 7 07/11/2021    TBILI 0 67 07/11/2021    CHOLESTEROL 215 (H) 02/13/2020    HDL 84 02/13/2020    TRIG 74 02/13/2020    LDLCALC 116 (H) 02/13/2020    NONHDLC 131 02/13/2020    AMMONIA <10 (L) 06/27/2021    [id number] 2 060 06/30/2021    FREET4 0 80 02/13/2020    PREGSERUM Negative 02/22/2014    RPR Non-Reactive 06/29/2021    HGBA1C 4 8 02/08/2021    EAG 91 02/08/2021       Counseling / Coordination of Care  Total floor / unit time spent today 25 minutes  Greater than 50% of total time was spent with the patient and / or family counseling and / or coordination of care   A description of the counseling / coordination of care:

## 2021-07-21 NOTE — PLAN OF CARE
Problem: Alteration in Thoughts and Perception  Goal: Treatment Goal: Gain control of psychotic behaviors/thinking, reduce/eliminate presenting symptoms and demonstrate improved reality functioning upon discharge  Outcome: Progressing  Goal: Verbalize thoughts and feelings  Description: Interventions:  - Promote a nonjudgmental and trusting relationship with the patient through active listening and therapeutic communication  - Assess patient's level of functioning, behavior and potential for risk  - Engage patient in 1 on 1 interactions  - Encourage patient to express fears, feelings, frustrations, and discuss symptoms    - [place] patient to reality, help patient recognize reality-based thinking   - Administer medications as ordered and assess for potential side effects  - Provide the patient education related to the signs and symptoms of the illness and desired effects of prescribed medications  Outcome: Progressing  Goal: Refrain from acting on delusional thinking/internal stimuli  Description: Interventions:  - Monitor patient closely, per order   - Utilize least restrictive measures   - Set reasonable limits, give positive feedback for acceptable   - Administer medications as ordered and monitor of potential side effects  Outcome: Progressing  Goal: Agree to be compliant with medication regime, as prescribed and report medication side effects  Description: Interventions:  - Offer appropriate PRN medication and supervise ingestion; conduct AIMS, as needed   Outcome: Progressing  Goal: Attend and participate in unit activities, including therapeutic, recreational, and educational groups  Description: Interventions:  -Encourage Visitation and family involvement in care  Outcome: Progressing  Goal: Recognize dysfunctional thoughts, communicate reality-based thoughts at the time of discharge  Description: Interventions:  - Provide medication and psycho-education to assist patient in compliance and developing insight into his/her illness   Outcome: Progressing  Goal: Complete daily ADLs, including personal hygiene independently, as able  Description: Interventions:  - Observe, teach, and assist patient with ADLS  - Monitor and promote a balance of rest/activity, with adequate nutrition and elimination   Outcome: Progressing     Problem: Anxiety  Goal: Anxiety is at manageable level  Description: Interventions:  - Assess and monitor patient's anxiety level  - Monitor for signs and symptoms (heart palpitations, chest pain, shortness of breath, headaches, nausea, feeling jumpy, restlessness, irritable, apprehensive)  - Collaborate with interdisciplinary team and initiate plan and interventions as ordered    - [place] patient to unit/surroundings  - Explain treatment plan  - Encourage participation in care  - Encourage verbalization of concerns/fears  - Identify coping mechanisms  - Assist in developing anxiety-reducing skills  - Administer/offer alternative therapies  - Limit or eliminate stimulants  Outcome: Progressing

## 2021-07-21 NOTE — NURSING NOTE
Patient is out for meals, visible in the hallways at times  Pt frequently walking from room to dayrooms, and alternating which dayroom she is in with no physical distress noted  Pt asking for wheelchair, however pt ambulates well with walker  Pt frequently sitting in chair at nurse's station, informed of patient confidentiality and encouraged to sit in dayroom  When giving morning medications, pt refused lovenox and compliant with all other meds  Pt stated she "felt as though she was having a panic attack," however calmed down after taking pills  Pt reported anxiety rated 4/4, currently denies all other s/s at this time  Will continue to monitor frequently  Pt coming up to nurse's station and demanding to use phone  Pt informed pt phones are at the end of the hallway  Pt becoming irritable, making faces at what staff is saying, and yelling at staff that she is unable to walk  Pt stating she had major surgery and is unable to walk  Pt encouraged to increase walking throughout the day, educated on importance of mobility and independence  Pt encouraged by multiple staff and peers

## 2021-07-21 NOTE — PROGRESS NOTES
Pt attended 1 afternoon group  Pt did note that she can not remember when was the last time she laughed out loud  Pt was not able to identify humor as a coping mechanism  Pt did start to slowly engage with other peers and slightly get brighter in affect  Challenged the pt to speak with other peers and brainstorm for jokes and reminisce about comedies  07/21/21 1330   Activity/Group Checklist   Group Other (Comment)  (positive reflection: humor)   Attendance Attended   Attendance Duration (min) 31-45   Interactions Interacted appropriately   Affect/Mood Constricted   Goals Achieved Identified feelings; Discussed coping strategies; Able to listen to others; Able to engage in interactions

## 2021-07-22 LAB
OXYCODONE+OXYMORPHONE UR QL SCN: NEGATIVE
OXYCODONE+OXYMORPHONE UR QL SCN: NORMAL NG/ML

## 2021-07-22 PROCEDURE — 97167 OT EVAL HIGH COMPLEX 60 MIN: CPT

## 2021-07-22 RX ORDER — PROPRANOLOL HYDROCHLORIDE 10 MG/1
10 TABLET ORAL
Status: DISCONTINUED | OUTPATIENT
Start: 2021-07-22 | End: 2021-08-02

## 2021-07-22 RX ADMIN — HYDROXYZINE HYDROCHLORIDE 50 MG: 50 TABLET, FILM COATED ORAL at 23:12

## 2021-07-22 RX ADMIN — ACETAMINOPHEN 975 MG: 325 TABLET ORAL at 06:16

## 2021-07-22 RX ADMIN — MORPHINE SULFATE 15 MG: 15 TABLET, EXTENDED RELEASE ORAL at 20:53

## 2021-07-22 RX ADMIN — ACETAMINOPHEN 975 MG: 325 TABLET ORAL at 14:40

## 2021-07-22 RX ADMIN — HYDROXYZINE HYDROCHLORIDE 50 MG: 50 TABLET, FILM COATED ORAL at 09:03

## 2021-07-22 RX ADMIN — PREGABALIN 100 MG: 50 CAPSULE ORAL at 09:02

## 2021-07-22 RX ADMIN — BUSPIRONE HYDROCHLORIDE 30 MG: 15 TABLET ORAL at 20:54

## 2021-07-22 RX ADMIN — MORPHINE SULFATE 15 MG: 15 TABLET, EXTENDED RELEASE ORAL at 09:02

## 2021-07-22 RX ADMIN — QUETIAPINE FUMARATE 100 MG: 100 TABLET ORAL at 21:00

## 2021-07-22 RX ADMIN — DICLOFENAC SODIUM 2 G: 10 GEL TOPICAL at 09:03

## 2021-07-22 RX ADMIN — POTASSIUM CHLORIDE 20 MEQ: 20 TABLET, EXTENDED RELEASE ORAL at 09:02

## 2021-07-22 RX ADMIN — PHENYTOIN 2 MG: 125 SUSPENSION ORAL at 09:03

## 2021-07-22 RX ADMIN — DOCUSATE SODIUM 50 MG AND SENNOSIDES 8.6 MG 1 TABLET: 8.6; 5 TABLET, FILM COATED ORAL at 09:33

## 2021-07-22 RX ADMIN — PAROXETINE HYDROCHLORIDE 40 MG: 20 TABLET, FILM COATED ORAL at 09:02

## 2021-07-22 RX ADMIN — ATORVASTATIN CALCIUM 40 MG: 40 TABLET, FILM COATED ORAL at 17:24

## 2021-07-22 RX ADMIN — DICLOFENAC SODIUM 2 G: 10 GEL TOPICAL at 17:25

## 2021-07-22 RX ADMIN — VALBENAZINE 80 MG: 80 CAPSULE ORAL at 09:03

## 2021-07-22 RX ADMIN — QUETIAPINE FUMARATE 50 MG: 50 TABLET ORAL at 17:25

## 2021-07-22 RX ADMIN — PROPRANOLOL HYDROCHLORIDE 10 MG: 10 TABLET ORAL at 09:03

## 2021-07-22 RX ADMIN — BUSPIRONE HYDROCHLORIDE 30 MG: 15 TABLET ORAL at 09:02

## 2021-07-22 RX ADMIN — BUSPIRONE HYDROCHLORIDE 30 MG: 15 TABLET ORAL at 17:24

## 2021-07-22 RX ADMIN — QUETIAPINE FUMARATE 50 MG: 50 TABLET ORAL at 20:55

## 2021-07-22 RX ADMIN — OLANZAPINE 5 MG: 5 TABLET, FILM COATED ORAL at 20:04

## 2021-07-22 RX ADMIN — AMLODIPINE BESYLATE 5 MG: 5 TABLET ORAL at 09:02

## 2021-07-22 RX ADMIN — PREGABALIN 100 MG: 50 CAPSULE ORAL at 17:25

## 2021-07-22 RX ADMIN — PANTOPRAZOLE SODIUM 20 MG: 20 TABLET, DELAYED RELEASE ORAL at 06:16

## 2021-07-22 RX ADMIN — HYDROXYZINE HYDROCHLORIDE 50 MG: 50 TABLET, FILM COATED ORAL at 17:25

## 2021-07-22 RX ADMIN — FOLIC ACID 1000 MCG: 1 TABLET ORAL at 09:02

## 2021-07-22 RX ADMIN — ASPIRIN 81 MG CHEWABLE TABLET 81 MG: 81 TABLET CHEWABLE at 09:02

## 2021-07-22 RX ADMIN — QUETIAPINE FUMARATE 50 MG: 50 TABLET ORAL at 09:03

## 2021-07-22 RX ADMIN — ACETAMINOPHEN 975 MG: 325 TABLET ORAL at 21:00
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
## 2021-07-22 NOTE — NURSING NOTE
Pressure channel 2 zeroed. States she slept well  Denies SI or depression  Rates anxiety 3/4 this AM  Scheduled tylenol given for 6/10 right leg pain

## 2021-07-22 NOTE — PLAN OF CARE
Problem: Alteration in Thoughts and Perception  Goal: Treatment Goal: Gain control of psychotic behaviors/thinking, reduce/eliminate presenting symptoms and demonstrate improved reality functioning upon discharge  Outcome: Progressing  Goal: Verbalize thoughts and feelings  Description: Interventions:  - Promote a nonjudgmental and trusting relationship with the patient through active listening and therapeutic communication  - Assess patient's level of functioning, behavior and potential for risk  - Engage patient in 1 on 1 interactions  - Encourage patient to express fears, feelings, frustrations, and discuss symptoms    - [place] patient to reality, help patient recognize reality-based thinking   - Administer medications as ordered and assess for potential side effects  - Provide the patient education related to the signs and symptoms of the illness and desired effects of prescribed medications  Outcome: Progressing  Goal: Refrain from acting on delusional thinking/internal stimuli  Description: Interventions:  - Monitor patient closely, per order   - Utilize least restrictive measures   - Set reasonable limits, give positive feedback for acceptable   - Administer medications as ordered and monitor of potential side effects  Outcome: Progressing  Goal: Agree to be compliant with medication regime, as prescribed and report medication side effects  Description: Interventions:  - Offer appropriate PRN medication and supervise ingestion; conduct AIMS, as needed   Outcome: Progressing     Problem: Ineffective Coping  Goal: Patient/Family participate in treatment and DC plans  Description: Interventions:  - Provide therapeutic environment  Outcome: Progressing  Goal: Patient/Family verbalizes awareness of resources  Outcome: Progressing     Problem: Anxiety  Goal: Anxiety is at manageable level  Description: Interventions:  - Assess and monitor patient's anxiety level     - Monitor for signs and symptoms (heart palpitations, chest pain, shortness of breath, headaches, nausea, feeling jumpy, restlessness, irritable, apprehensive)  - Collaborate with interdisciplinary team and initiate plan and interventions as ordered  - [place] patient to unit/surroundings  - Explain treatment plan  - Encourage participation in care  - Encourage verbalization of concerns/fears  - Identify coping mechanisms  - Assist in developing anxiety-reducing skills  - Administer/offer alternative therapies  - Limit or eliminate stimulants  Outcome: Progressing     Problem: BEHAVIOR  Goal: Pt/Family maintain appropriate behavior and adhere to behavioral management agreement, if implemented  Description: INTERVENTIONS:  - Assess the family dynamic   - Encourage verbalization of thoughts and concerns in a socially appropriate manner  - Assess patient/family's coping skills and non-compliant behavior (including use of illegal substances)  - Utilize positive, consistent limit setting strategies supporting safety of patient, staff and others  - Initiate consult with Case Management, Spiritual Care or other ancillary services as appropriate  - If a patient's/visitor's behavior jeopardizes the safety of the patient, staff, or others, refer to organization procedure     - Notify Security of behavior or suspected illegal substances which indicate the need for search of the patient and/or belongings  - Encourage participation in the decision making process about a behavioral management agreement; implement if patient meets criteria  Outcome: Progressing     Problem: ANXIETY  Goal: Will report anxiety at manageable levels  Description: INTERVENTIONS:  - Administer medication as ordered  - Teach and encourage coping skills  - Provide emotional support  - Assess patient/family for anxiety and ability to cope  Outcome: Progressing  Goal: By discharge: Patient will verbalize 2 strategies to deal with anxiety  Description: Interventions:  - Identify any obvious source/trigger to anxiety  - Staff will assist patient in applying identified coping technique/skills  - Encourage attendance of scheduled groups and activities  Outcome: Progressing     Problem: PAIN - ADULT  Goal: Verbalizes/displays adequate comfort level or baseline comfort level  Description: Interventions:  - Encourage patient to monitor pain and request assistance  - Assess pain using appropriate pain scale  - Administer analgesics based on type and severity of pain and evaluate response  - Implement non-pharmacological measures as appropriate and evaluate response  - Consider cultural and social influences on pain and pain management  - Notify physician/advanced practitioner if interventions unsuccessful or patient reports new pain  Outcome: Progressing     Problem: Alteration in Thoughts and Perception  Goal: Recognize dysfunctional thoughts, communicate reality-based thoughts at the time of discharge  Description: Interventions:  - Provide medication and psycho-education to assist patient in compliance and developing insight into his/her illness   Outcome: Not Progressing     Problem: Ineffective Coping  Goal: Identifies ineffective coping skills  Outcome: Not Progressing  Goal: Identifies healthy coping skills  Outcome: Not Progressing  Goal: Demonstrates healthy coping skills  Outcome: Not Progressing

## 2021-07-22 NOTE — PROGRESS NOTES
07/22/21 0834   Team Meeting   Meeting Type Daily Rounds   Team Members Present   Team Members Present Physician;Nurse;   Physician Team Member Gross   Nursing Team Member [facility] Management Team Member Augustine   Patient/Family Present   Patient Present No   Patient's Family Present No     Patient attended 1 group  She is compliant with medications  She is requesting a wheelchair to use while admitted  The patient has a wheel chair

## 2021-07-22 NOTE — NURSING NOTE
Pt visible on unit, cooperative and pleasant  Denies SI, states depression is "not too bad " Reports having "panic attack", pt appears calm with no distress  Pt encouraged to see if scheduled medications are effective, no continued symptoms reported  Pt calm and social with peers in evening  Pt reports 7/10 right leg pain prior to scheduled pain medications  Medication effective, pt appears to sleep

## 2021-07-22 NOTE — PLAN OF CARE
Pt quietly present for only 15 mins  of one of three groups this afternoon    Problem: Ineffective Coping  Goal: Participates in unit activities  Description: Interventions:  - Provide therapeutic environment   - Provide required programming   - Redirect inappropriate behaviors   Outcome: Not Progressing

## 2021-07-22 NOTE — OCCUPATIONAL THERAPY NOTE
Occupational Therapy Evaluation     Patient Name: Pipo Hairston  IWSPH'Z Date: 2021  Problem List  Principal Problem:    Bipolar II disorder ([address] )  Active Problems:    Chronic bilateral low back pain with bilateral sciatica    Chronic pain syndrome    Essential hypertension    History of hypokalemia    Opioid dependence (HCC)    Panic disorder without agoraphobia    Post traumatic stress disorder    Tardive dyskinesia    Primary osteoarthritis of both knees    Multiple closed fractures of metatarsal bone of right foot    History of CVA (cerebrovascular accident)    Medical clearance for psychiatric admission    Mixed hyperlipidemia    Leukopenia    Past Medical History  Past Medical History:   Diagnosis Date    Acid reflux     Anxiety     RESOLVED:     Arthritis     Bipolar 2 disorder (HCC)     FOLLOWS WITH PSYCHIATRIST  CONTINUE LAMOTRIGINE; RESOLVED: [id number]    Depression     Familial tremor     both hands    Fibromyalgia     LAST ASSESSED: [id number]    Hearing aid worn     left ear    [place] (hard of hearing)     left ear    Hypertension     Left-sided weakness     Lower back pain     Memory loss of unknown cause     long and short term    Migraine     Obesity     Obesity, Class II, BMI 35-39 9     Overactive bladder     Panic attack     Post traumatic stress disorder     Seasonal allergies     Stroke [facility])     questionable stroke 2009    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    CONT ASA 81 MG DAILY; RESOLVED: [id number]    Urinary incontinence     Wears dentures     partial lower / full upper    Wears glasses      Past Surgical History  Past Surgical History:   Procedure Laterality Date    BACK SURGERY       SECTION      COLONOSCOPY      RESOLVED: [id number]    EAR SURGERY      EGD      HYSTERECTOMY  2004    MYRINGOTOMY W/ TUBES Left     NECK SURGERY  2019    NV CYSTOURETHROSCOPY N/A 2016    Procedure: CYSTOSCOPY, BOTOX INJECTION;  Surgeon: Brayan Hewitt MD;  Location: [facility];  Service: Gynecology    FL IMPLANT SPINAL NEUROSTIM/ Right 2/10/2021    Procedure: REPLACEMENT IMPLANTABLE PULSE GENERATOR DORSAL SPINAL COLUMN STIMULATOR, RIGHT;  Surgeon: Shannon Barber MD;  [facility]: [facility];  Service: Neurosurgery    FL PERCUT IMPLNT [address] Right 7/28/2020    Procedure: INSERTION THORACIC DORSAL COLUMN SPINAL CORD STIMULATOR PERCUTANEOUS W IMPLANTABLE PULSE GENERATOR, RIGHT;  Surgeon: Shannon Barber MD;  Location: [facility];  Service: Neurosurgery    [facility]    UPPER GASTROINTESTINAL ENDOSCOPY  09/2020 07/22/21 1340   OT Last Visit   OT Visit Date 07/22/21   Note Type   Note type Evaluation   Restrictions/Precautions   Weight Bearing Precautions Per Order No   Other Precautions Pain   Pain Assessment   Pain Assessment Tool 0-10   Pain Score 7   Pain Location/Orientation Orientation: Lower; Location: Back   Pain Onset/Description Onset: Ongoing   Effect of Pain on Daily Activities impacts ability to participate in prolonged activity in standing    Patient's Stated Pain Goal No pain   Home Living   Type of [address] One level   Additional Comments 1st floor apartment with 2 SKY; ambulates with RW and w/c at baseline  Son assists as needed    Prior Function   Level of [place] Independent with ADLs and functional mobility; Needs assistance with IADLs   Lives With Son;Family   Receives Help From Family   ADL Assistance Independent   IADLs Needs assistance   Falls in the last 6 months 0   Psychosocial   Psychosocial (WDL) X   Patient Behaviors/Mood Anxious; Flat affect; Cooperative   Subjective   Subjective "I can't walk"    ADL   Eating Assistance 7  Independent   Grooming Assistance 7  Independent   UB Bathing Assistance 7  Independent   LB Bathing Assistance 5  Supervision/Setup   [facility] 5 Supervision/Setup   Toileting Assistance  5  Supervision/Setup   Bed Mobility   Supine to Sit 6  Modified independent   Additional items Increased time required   Sit to Supine 6  Modified independent   Additional items Increased time required   Transfers   Sit to Stand 6  Modified independent   Additional items Armrests; Increased time required   Stand to Sit 6  Modified independent   Additional items Armrests; Increased time required   Additional Comments with RW    Functional Mobility   Functional Mobility 6  Modified independent   Additional Comments short household distances limited by LE weakness and back pain    Additional items Rolling walker   Balance   Static Sitting Fair +   Dynamic Sitting Fair   Static Standing Fair   Dynamic Standing Fair -   Ambulatory Fair -   Activity Tolerance   Activity Tolerance Patient limited by pain   Medical Staff Made Aware Spoke to CM   Nurse Made Aware Spoke to RN    RUE Assessment   RUE Assessment WFL   LUE Assessment   LUE Assessment WFL   Hand Function   Gross Motor Coordination Functional   Fine Motor Coordination Functional   Perception   Inattention/Neglect Appears intact   Cognition   Arousal/Participation Cooperative   Attention Within functional limits   Orientation Level Oriented X4   Memory Within functional limits   Following Commands Follows all commands and directions without difficulty   Comments increased time to process at times; flat    Assessment   Limitation Decreased endurance  (Pain )   Assessment Pt is a 60 y o  female seen for OT evaluation s/p admit to [facility] on 7/15/2021 w/ Bipolar II disorder ([facility] [address] )  OT completed an extensive review of pt's medical and social history  See PMH in pt chart  Personal factors affecting pt at time of IE include:steps to enter environment, limited home support, difficulty performing IADLS  and environment  As per pt report, pta living with son in 1st floor apt with 2 SKY   Independent with ADL and mobility with intermittent use of RW/w/c  Son currently on leave from work, able to A (per pt)  Upon evaluation, pt MI with transfers and mobility using RW  Pain impacting overall performance at this time  Ambulatory distance limited by pain/weakness however remains functional for household distance, per baseline  Pt requesting use of w/c on unit, encouraged to ambulate vs self propel to prevent functional decline  Pt currently MI with UB self care, supervision for LB ADLs and toileting d/t body habitus, limited ROM, and pain  Pt appears to be at functional baseline  The patient's raw score on the AM-PAC Daily Activity inpatient short form is 21, standardized score is 44 27, greater than 39 4  Patients at this level are likely to benefit from discharge to home  Please refer to the recommendation of the Occupational Therapist for safe discharge planning  Based on OT evaluation, assessment(s), performance deficits listed, and current level of function, pt identified as a HIGH  complexity evaluation  From OT standpoint, recommendation at time of d/c would be home with [facility] and family support      Goals   Patient Goals to get my medications in order    Plan   OT Frequency Eval only   Recommendation   OT Discharge Recommendation Home with home health rehabilitation   [facility] Daily Activity Inpatient   Lower Body Dressing 3   Bathing 3   Toileting 3   Upper Body Dressing 4   Grooming 4   Eating 4   Daily Activity Raw Score 21   Daily Activity Standardized Score (Calc for Raw Score >=11) 44 27         Greg Whitman MS, OTR/L

## 2021-07-22 NOTE — NURSING NOTE
Pt asking CM for a wheelchair  Pt approached by nursing and informed if she needs wheelchair to ask nursing staff for it  Pt verbalized understanding  Pt did not ask for a wheelchair during the day and has been ambulating through halls and between dayrooms during day  Pt educated on importance of walking and encouraged to continue to use RW when capable, given wheelchair  Pt refusing to use RW and states she wants the wheelchair "only so I can wheel from room to room  "

## 2021-07-22 NOTE — PROGRESS NOTES
Progress Note - Behavioral Health   Samantha CRISTIANO Peterson 60 y o  female MRN: [id number]  Unit/Bed#: [facility] 257-26 Encounter: [id number]    Assessment/Plan   Principal Problem:    Bipolar II disorder ([address] )  Active Problems:    Chronic bilateral low back pain with bilateral sciatica    Chronic pain syndrome    Essential hypertension    History of hypokalemia    Opioid dependence (HCC)    Panic disorder without agoraphobia    Post traumatic stress disorder    Tardive dyskinesia    Primary osteoarthritis of both knees    Multiple closed fractures of metatarsal bone of right foot    History of CVA (cerebrovascular accident)    Medical clearance for psychiatric admission    Mixed hyperlipidemia    Leukopenia  Patient reports feeling anxious and panicky but not quite as severe as the for  Also concerned about increasing tremors which make it to for her to eat because her hands shake too much  She is requesting a medication to help delicious  She is out of her room has difficulty walking and has requested a wheelchair because the she has a problem walking distances on the unit  She says she has a wheelchair at home      Behavior over the last 24 hours:  unchanged  Sleep: normal  Appetite: normal  Medication side effects: No  ROS: no complaints    Mental Status Evaluation:  Appearance:  age appropriate   Behavior:  guarded, attention seeking and medication seeking   Speech:  normal pitch and normal volume   Mood:  anxious and depressed and irritable   Affect:  blunted and constricted   Thought Process:  tangential   Thought Content:  Somatic   Perceptual Disturbances: None   Risk Potential: Suicidal Ideations none  Homicidal Ideations none  Potential for Aggression No   Sensorium:  person, place, and situation   Memory:  recent and remote memory grossly intact   Consciousness:  alert and awake    Attention: attention span appeared shorter than expected for age   Insight:  fair   Judgment: fair   Gait/Station: normal gait/station   Motor Activity: no abnormal movements     Progress Toward Goals:  Little change    Recommended Treatment: Continue with group therapy, milieu therapy and occupational therapy  Risks, benefits and possible side effects of Medications:   Risks, benefits, and possible side effects of medications explained to patient and patient verbalizes understanding  Medications: planned medication changes: Propanolol 10 mg b i d     Labs: I have personally reviewed all pertinent laboratory/tests results  Most Recent Labs:   Lab Results   Component Value Date    WBC 6 10 07/20/2021    RBC 4 25 07/20/2021    HGB 12 7 07/20/2021    HCT 38 3 07/20/2021     07/20/2021    RDW 14 8 07/20/2021    NEUTROABS 3 80 07/20/2021    SODIUM 140 07/17/2021    K 4 0 07/17/2021     07/17/2021    CO2 25 07/17/2021    BUN 11 07/17/2021    CREATININE 0 63 07/17/2021    GLUC 96 07/17/2021    GLUF 96 07/17/2021    CALCIUM 8 9 07/17/2021    AST 12 07/11/2021    ALT 18 07/11/2021    ALKPHOS 104 07/11/2021    TP 7 8 07/11/2021    ALB 3 7 07/11/2021    TBILI 0 67 07/11/2021    CHOLESTEROL 215 (H) 02/13/2020    HDL 84 02/13/2020    TRIG 74 02/13/2020    LDLCALC 116 (H) 02/13/2020    NONHDLC 131 02/13/2020    AMMONIA <10 (L) 06/27/2021    QTV5MXWXKHTP 2 060 06/30/2021    FREET4 0 80 02/13/2020    PREGSERUM Negative 02/22/2014    RPR Non-Reactive 06/29/2021    HGBA1C 4 8 02/08/2021    EAG 91 02/08/2021       Counseling / Coordination of Care  Total floor / unit time spent today 25 minutes  Greater than 50% of total time was spent with the patient and / or family counseling and / or coordination of care   A description of the counseling / coordination of care:

## 2021-07-22 NOTE — NURSING NOTE
Pt resting comfortably in bed after breakfast; pleasant on approach; cooperative with no signs of distress  Pt reports 5/10 depression, 4/4 anxiety d/t current admission  Denies all other s/s  Pt encouraged to ambulate around the unit to relieve constipation  Pt stated, "I can't walk that far  I had broke my foot two months ago and it hurts to walk that far " Pt was provided with education on appropriate ambulation technique and verbally agreed to attempt ambulating further on the unit later in the day  Pt then requested to lay back in bed  Pt is currently resting comfortably in bed, with no other need verbalized  Will continue to encourage pt to participate in unit activities

## 2021-07-23 ENCOUNTER — TELEPHONE (OUTPATIENT)
Dept: INTERNAL MEDICINE CLINIC | Facility: CLINIC | Age: 56
End: 2021-07-23

## 2021-07-23 LAB
BASOPHILS # BLD AUTO: 0 THOUSANDS/ΜL (ref 0–0.1)
BASOPHILS NFR BLD AUTO: 1 % (ref 0–1)
EOSINOPHIL # BLD AUTO: 0.1 THOUSAND/ΜL (ref 0–0.4)
EOSINOPHIL NFR BLD AUTO: 2 % (ref 0–6)
ERYTHROCYTE [DISTWIDTH] IN BLOOD BY AUTOMATED COUNT: 14.8 %
HCT VFR BLD AUTO: 36.3 % (ref 36–46)
HGB BLD-MCNC: 12.2 G/DL (ref 12–16)
LYMPHOCYTES # BLD AUTO: 1.7 THOUSANDS/ΜL (ref 0.5–4)
LYMPHOCYTES NFR BLD AUTO: 30 % (ref 25–45)
MCH RBC QN AUTO: 30.3 PG (ref 26–34)
MCHC RBC AUTO-ENTMCNC: 33.7 G/DL (ref 31–36)
MCV RBC AUTO: 90 FL (ref 80–100)
MONOCYTES # BLD AUTO: 0.4 THOUSAND/ΜL (ref 0.2–0.9)
MONOCYTES NFR BLD AUTO: 6 % (ref 1–10)
NEUTROPHILS # BLD AUTO: 3.4 THOUSANDS/ΜL (ref 1.8–7.8)
NEUTS SEG NFR BLD AUTO: 60 % (ref 45–65)
PLATELET # BLD AUTO: 263 THOUSANDS/UL (ref 150–450)
PMV BLD AUTO: 7.4 FL (ref 8.9–12.7)
RBC # BLD AUTO: 4.03 MILLION/UL (ref 4–5.2)
WBC # BLD AUTO: 5.6 THOUSAND/UL (ref 4.5–11)

## 2021-07-23 PROCEDURE — 85025 COMPLETE CBC W/AUTO DIFF WBC: CPT | Performed by: PHYSICIAN ASSISTANT

## 2021-07-23 RX ADMIN — PAROXETINE HYDROCHLORIDE 40 MG: 20 TABLET, FILM COATED ORAL at 08:14

## 2021-07-23 RX ADMIN — HYDROXYZINE HYDROCHLORIDE 50 MG: 50 TABLET, FILM COATED ORAL at 16:17

## 2021-07-23 RX ADMIN — HYDROXYZINE HYDROCHLORIDE 50 MG: 50 TABLET, FILM COATED ORAL at 08:14

## 2021-07-23 RX ADMIN — PROPRANOLOL HYDROCHLORIDE 10 MG: 10 TABLET ORAL at 13:18

## 2021-07-23 RX ADMIN — BUSPIRONE HYDROCHLORIDE 30 MG: 15 TABLET ORAL at 21:03

## 2021-07-23 RX ADMIN — QUETIAPINE 75 MG: 50 TABLET ORAL at 16:17

## 2021-07-23 RX ADMIN — POTASSIUM CHLORIDE 20 MEQ: 20 TABLET, EXTENDED RELEASE ORAL at 08:14

## 2021-07-23 RX ADMIN — PHENYTOIN 2 MG: 125 SUSPENSION ORAL at 08:14

## 2021-07-23 RX ADMIN — ATORVASTATIN CALCIUM 40 MG: 40 TABLET, FILM COATED ORAL at 16:17

## 2021-07-23 RX ADMIN — AMLODIPINE BESYLATE 5 MG: 5 TABLET ORAL at 08:13

## 2021-07-23 RX ADMIN — MORPHINE SULFATE 15 MG: 15 TABLET, EXTENDED RELEASE ORAL at 08:14

## 2021-07-23 RX ADMIN — PANTOPRAZOLE SODIUM 20 MG: 20 TABLET, DELAYED RELEASE ORAL at 06:50

## 2021-07-23 RX ADMIN — PROPRANOLOL HYDROCHLORIDE 10 MG: 10 TABLET ORAL at 07:19

## 2021-07-23 RX ADMIN — DICLOFENAC SODIUM 2 G: 10 GEL TOPICAL at 21:04

## 2021-07-23 RX ADMIN — ASPIRIN 81 MG CHEWABLE TABLET 81 MG: 81 TABLET CHEWABLE at 08:13

## 2021-07-23 RX ADMIN — PREGABALIN 100 MG: 50 CAPSULE ORAL at 08:14

## 2021-07-23 RX ADMIN — HYDROXYZINE HYDROCHLORIDE 50 MG: 50 TABLET, FILM COATED ORAL at 21:03

## 2021-07-23 RX ADMIN — DICLOFENAC SODIUM 2 G: 10 GEL TOPICAL at 17:20

## 2021-07-23 RX ADMIN — FOLIC ACID 1000 MCG: 1 TABLET ORAL at 08:14

## 2021-07-23 RX ADMIN — BUSPIRONE HYDROCHLORIDE 30 MG: 15 TABLET ORAL at 08:13

## 2021-07-23 RX ADMIN — QUETIAPINE FUMARATE 100 MG: 100 TABLET ORAL at 21:04

## 2021-07-23 RX ADMIN — QUETIAPINE 75 MG: 50 TABLET ORAL at 08:14

## 2021-07-23 RX ADMIN — ACETAMINOPHEN 975 MG: 325 TABLET ORAL at 21:03

## 2021-07-23 RX ADMIN — ACETAMINOPHEN 975 MG: 325 TABLET ORAL at 06:50

## 2021-07-23 RX ADMIN — QUETIAPINE 75 MG: 50 TABLET ORAL at 21:03

## 2021-07-23 RX ADMIN — PREGABALIN 100 MG: 50 CAPSULE ORAL at 17:19

## 2021-07-23 RX ADMIN — ACETAMINOPHEN 975 MG: 325 TABLET ORAL at 13:18

## 2021-07-23 RX ADMIN — VALBENAZINE 80 MG: 80 CAPSULE ORAL at 08:14

## 2021-07-23 RX ADMIN — DICLOFENAC SODIUM 2 G: 10 GEL TOPICAL at 08:18

## 2021-07-23 RX ADMIN — DICLOFENAC SODIUM 2 G: 10 GEL TOPICAL at 13:20

## 2021-07-23 RX ADMIN — BUSPIRONE HYDROCHLORIDE 30 MG: 15 TABLET ORAL at 16:17

## 2021-07-23 RX ADMIN — MORPHINE SULFATE 15 MG: 15 TABLET, EXTENDED RELEASE ORAL at 21:03

## 2021-07-23 NOTE — NURSING NOTE
Patient is present in the dayroom at start of shift  Pt asking for scheduled medications prior to administration times  Pt educated on times when meds will be given  Pt following RN while medications were being administered to another peer  Pt reported anxiety rated 2/4  Pt using a wheelchair on unit, refusing to use RW during morning and early afternoon  Pt stated she "will use it eventually "  Pt educated on importance of ambulation  Pt making faces and eye rolling when attempting to educate patient  Pt refused education on ambulation  Pt refusing to come out for lunch  No signs of distress noted at this time  Will continue to monitor frequently

## 2021-07-23 NOTE — NURSING NOTE
Patient states she is extremely nervous and anxious  Explained to patient she has something scheduled for anxiety as part of her evening medication  Told patient in the meantime to try and do deep breathing exercises  Patient insisted that she have something now  Patient was given Zyprexa 5 mg at 2004  Patient was visible on the unit but isolative to self, was pleasant upon approach, cooperative with care, rates depression 6/10, anxiety 4/4, denies all other s/s  Pt was medication compliant  Will continue to monitor patient

## 2021-07-23 NOTE — PROGRESS NOTES
Pt attempted and left early in afternoon group  Pt stated she could not read any books or materials due to not having her glasses  Pt then wanted a journal because "it is free"  Encouraged pt only to take a journal for the purposes of journaling and creative process  Pt was not able to identify any music or select for listening to during group  Pt restless and using rw as moved around room  07/23/21 1330   Activity/Group Checklist   Group Other (Comment)  (positive reflection: music)   Attendance Attended; Other (Comment)  (left early)

## 2021-07-23 NOTE — TELEPHONE ENCOUNTER
Mick care taker of patient is requesting a letter for medical necessity for her spine stimulator  He got a [employer] shut off termination notice for 7/27/21  He is requesting a written letter by her PCP stating that Samantha needs electric running for medical reasons  Please review I will upload termination notice into   Once we have letter completed please call Leelee Navarro at [phone]  Thank you

## 2021-07-23 NOTE — PLAN OF CARE
Problem: Alteration in Thoughts and Perception  Goal: Treatment Goal: Gain control of psychotic behaviors/thinking, reduce/eliminate presenting symptoms and demonstrate improved reality functioning upon discharge  Outcome: Progressing  Goal: Verbalize thoughts and feelings  Description: Interventions:  - Promote a nonjudgmental and trusting relationship with the patient through active listening and therapeutic communication  - Assess patient's level of functioning, behavior and potential for risk  - Engage patient in 1 on 1 interactions  - Encourage patient to express fears, feelings, frustrations, and discuss symptoms    - [place] patient to reality, help patient recognize reality-based thinking   - Administer medications as ordered and assess for potential side effects  - Provide the patient education related to the signs and symptoms of the illness and desired effects of prescribed medications  Outcome: Progressing  Goal: Refrain from acting on delusional thinking/internal stimuli  Description: Interventions:  - Monitor patient closely, per order   - Utilize least restrictive measures   - Set reasonable limits, give positive feedback for acceptable   - Administer medications as ordered and monitor of potential side effects  Outcome: Progressing  Goal: Agree to be compliant with medication regime, as prescribed and report medication side effects  Description: Interventions:  - Offer appropriate PRN medication and supervise ingestion; conduct AIMS, as needed   Outcome: Progressing  Goal: Attend and participate in unit activities, including therapeutic, recreational, and educational groups  Description: Interventions:  -Encourage Visitation and family involvement in care  Outcome: Progressing  Goal: Recognize dysfunctional thoughts, communicate reality-based thoughts at the time of discharge  Description: Interventions:  - Provide medication and psycho-education to assist patient in compliance and developing insight into his/her illness   Outcome: Progressing  Goal: Complete daily ADLs, including personal hygiene independently, as able  Description: Interventions:  - Observe, teach, and assist patient with ADLS  - Monitor and promote a balance of rest/activity, with adequate nutrition and elimination   Outcome: Progressing     Problem: Ineffective Coping  Goal: Identifies ineffective coping skills  Outcome: Progressing  Goal: Identifies healthy coping skills  Outcome: Progressing  Goal: Demonstrates healthy coping skills  Outcome: Progressing  Goal: Patient/Family participate in treatment and DC plans  Description: Interventions:  - Provide therapeutic environment  Outcome: Progressing  Goal: Patient/Family verbalizes awareness of resources  Outcome: Progressing     Problem: Anxiety  Goal: Anxiety is at manageable level  Description: Interventions:  - Assess and monitor patient's anxiety level  - Monitor for signs and symptoms (heart palpitations, chest pain, shortness of breath, headaches, nausea, feeling jumpy, restlessness, irritable, apprehensive)  - Collaborate with interdisciplinary team and initiate plan and interventions as ordered  - [place] patient to unit/surroundings  - Explain treatment plan  - Encourage participation in care  - Encourage verbalization of concerns/fears  - Identify coping mechanisms  - Assist in developing anxiety-reducing skills  - Administer/offer alternative therapies  - Limit or eliminate stimulants  Outcome: Progressing     Problem: BEHAVIOR  Goal: Pt/Family maintain appropriate behavior and adhere to behavioral management agreement, if implemented  Description: INTERVENTIONS:  - Assess the family dynamic   - Encourage verbalization of thoughts and concerns in a socially appropriate manner  - Assess patient/family's coping skills and non-compliant behavior (including use of illegal substances)    - Utilize positive, consistent limit setting strategies supporting safety of patient, staff and others  - Initiate consult with Case Management, Spiritual Care or other ancillary services as appropriate  - If a patient's/visitor's behavior jeopardizes the safety of the patient, staff, or others, refer to organization procedure     - Notify Security of behavior or suspected illegal substances which indicate the need for search of the patient and/or belongings  - Encourage participation in the decision making process about a behavioral management agreement; implement if patient meets criteria  Outcome: Progressing     Problem: ANXIETY  Goal: Will report anxiety at manageable levels  Description: INTERVENTIONS:  - Administer medication as ordered  - Teach and encourage coping skills  - Provide emotional support  - Assess patient/family for anxiety and ability to cope  Outcome: Progressing  Goal: By discharge: Patient will verbalize 2 strategies to deal with anxiety  Description: Interventions:  - Identify any obvious source/trigger to anxiety  - Staff will assist patient in applying identified coping technique/skills  - Encourage attendance of scheduled groups and activities  Outcome: Progressing     Problem: PAIN - ADULT  Goal: Verbalizes/displays adequate comfort level or baseline comfort level  Description: Interventions:  - Encourage patient to monitor pain and request assistance  - Assess pain using appropriate pain scale  - Administer analgesics based on type and severity of pain and evaluate response  - Implement non-pharmacological measures as appropriate and evaluate response  - Consider cultural and social influences on pain and pain management  - Notify physician/advanced practitioner if interventions unsuccessful or patient reports new pain  Outcome: Progressing     Problem: Ineffective Coping  Goal: Participates in unit activities  Description: Interventions:  - Provide therapeutic environment   - Provide required programming   - Redirect inappropriate behaviors   Outcome: Progressing

## 2021-07-23 NOTE — PROGRESS NOTES
07/23/21 5536   Team Meeting   Meeting Type Daily Rounds   Initial Conference Date 07/23/21   Next Conference Date 07/24/21   Team Members Present   Team Members Present Physician;Nurse;Occupational Therapist;;   Patient/Family Present   Patient Present No   Patient's Family Present No      07/23/21 0927   Team Meeting   Meeting Type Daily Rounds   Initial Conference Date 07/23/21   Next Conference Date 07/24/21   Team Members Present   Team Members Present Physician;Nurse;Occupational Therapist;;   Patient/Family Present   Patient Present No   Patient's Family Present No   NAHID Rodriguez T [place] - on days yesterday declined offer of wc and was encouraged to ask when needed, at change of shift told CM that nursing refused to give her a WC , ambulated w/ RW most of day, expressed panic to Dr Jed Beltrán, visible, cooperative, encouraged to tell RN when having pain

## 2021-07-23 NOTE — PROGRESS NOTES
Progress Note - Behavioral Health   Samantha QUINONEZ Lisa Burt 60 y o  female MRN: [id number]  Unit/Bed#: [facility] Encounter: [id number]    Assessment/Plan   Principal Problem:    Bipolar II disorder ([address] )  Active Problems:    Chronic bilateral low back pain with bilateral sciatica    Chronic pain syndrome    Essential hypertension    History of hypokalemia    Opioid dependence (HCC)    Panic disorder without agoraphobia    Post traumatic stress disorder    Tardive dyskinesia    Primary osteoarthritis of both knees    Multiple closed fractures of metatarsal bone of right foot    History of CVA (cerebrovascular accident)    Medical clearance for psychiatric admission    Mixed hyperlipidemia    Leukopenia  Patient continues report the a great deal of anxiety and panic and says she was very panicky for the most of the evening  He gets scared that she is going to die  Needs a lot of assistance and encouragement from staff the is attention seeking and med seeking at times  Truly she is unable to function take care of herself outside the hospital in her condition at this time      Behavior over the last 24 hours:  unchanged  Sleep: normal  Appetite: normal  Medication side effects: No  ROS: no complaints    Mental Status Evaluation:  Appearance:  age appropriate   Behavior:  guarded   Speech:  normal pitch and normal volume   Mood:  anxious and depressed   Affect:  blunted and constricted   Thought Process:  tangential   Thought Content:  normal   Perceptual Disturbances: None   Risk Potential: Suicidal Ideations none  Homicidal Ideations none  Potential for Aggression No   Sensorium:  person, place, and situation   Memory:  recent and remote memory grossly intact   Consciousness:  alert and awake    Attention: attention span appeared shorter than expected for age   Insight:  fair   Judgment: fair   Gait/Station:  abnormal gait/station   Motor Activity: Tongue and mouth dyskinesias     Progress Toward Goals:  No change    Recommended Treatment: Continue with group therapy, milieu therapy and occupational therapy  Risks, benefits and possible side effects of Medications:   Risks, benefits, and possible side effects of medications explained to patient and patient verbalizes understanding  Medications: planned medication changes: Will increase Seroquel to 75 mg t i d  And keep 100 mg HS  Labs: I have personally reviewed all pertinent laboratory/tests results  Most Recent Labs:   Lab Results   Component Value Date    WBC 5 60 07/23/2021    RBC 4 03 07/23/2021    HGB 12 2 07/23/2021    HCT 36 3 07/23/2021     07/23/2021    RDW 14 8 07/23/2021    NEUTROABS 3 40 07/23/2021    SODIUM 140 07/17/2021    K 4 0 07/17/2021     07/17/2021    CO2 25 07/17/2021    BUN 11 07/17/2021    CREATININE 0 63 07/17/2021    GLUC 96 07/17/2021    GLUF 96 07/17/2021    CALCIUM 8 9 07/17/2021    AST 12 07/11/2021    ALT 18 07/11/2021    ALKPHOS 104 07/11/2021    TP 7 8 07/11/2021    ALB 3 7 07/11/2021    TBILI 0 67 07/11/2021    CHOLESTEROL 215 (H) 02/13/2020    HDL 84 02/13/2020    TRIG 74 02/13/2020    LDLCALC 116 (H) 02/13/2020    NONHDLC 131 02/13/2020    AMMONIA <10 (L) 06/27/2021    [id number] 2 060 06/30/2021    FREET4 0 80 02/13/2020    PREGSERUM Negative 02/22/2014    RPR Non-Reactive 06/29/2021    HGBA1C 4 8 02/08/2021    EAG 91 02/08/2021       Counseling / Coordination of Care  Total floor / unit time spent today 25 minutes  Greater than 50% of total time was spent with the patient and / or family counseling and / or coordination of care   A description of the counseling / coordination of care:

## 2021-07-23 NOTE — NURSING NOTE
Patient visible on the unit at times, socializes with staff and peers  Patient brightens when approached, reporting 8/10 depression and 4/4 anxiety  Denies SI/HI/AH/VH  When patient asked about how she has been using wheelchair, patient states "I wish everyone would just let me use it, stop asking me  Just because I walked before doesn't mean I want to now  My son said I can use this so everyone else needs to mind their business"  Patient reassured  Compliant with medications and meals, appetite good  VSS  Will continue to monitor frequently

## 2021-07-24 PROCEDURE — 99233 SBSQ HOSP IP/OBS HIGH 50: CPT | Performed by: PHYSICIAN ASSISTANT

## 2021-07-24 RX ADMIN — PREGABALIN 100 MG: 50 CAPSULE ORAL at 09:16

## 2021-07-24 RX ADMIN — ACETAMINOPHEN 975 MG: 325 TABLET ORAL at 06:11

## 2021-07-24 RX ADMIN — HYDROXYZINE HYDROCHLORIDE 50 MG: 50 TABLET, FILM COATED ORAL at 09:16

## 2021-07-24 RX ADMIN — DICLOFENAC SODIUM 2 G: 10 GEL TOPICAL at 21:15

## 2021-07-24 RX ADMIN — MORPHINE SULFATE 15 MG: 15 TABLET, EXTENDED RELEASE ORAL at 21:15

## 2021-07-24 RX ADMIN — QUETIAPINE FUMARATE 100 MG: 100 TABLET ORAL at 21:15

## 2021-07-24 RX ADMIN — AMLODIPINE BESYLATE 5 MG: 5 TABLET ORAL at 09:16

## 2021-07-24 RX ADMIN — BUSPIRONE HYDROCHLORIDE 30 MG: 15 TABLET ORAL at 16:37

## 2021-07-24 RX ADMIN — VALBENAZINE 80 MG: 80 CAPSULE ORAL at 09:20

## 2021-07-24 RX ADMIN — HYDROXYZINE HYDROCHLORIDE 50 MG: 50 TABLET, FILM COATED ORAL at 21:15

## 2021-07-24 RX ADMIN — QUETIAPINE 75 MG: 50 TABLET ORAL at 09:15

## 2021-07-24 RX ADMIN — HYDROXYZINE HYDROCHLORIDE 50 MG: 50 TABLET, FILM COATED ORAL at 16:37

## 2021-07-24 RX ADMIN — FOLIC ACID 1000 MCG: 1 TABLET ORAL at 09:16

## 2021-07-24 RX ADMIN — QUETIAPINE 75 MG: 50 TABLET ORAL at 21:14

## 2021-07-24 RX ADMIN — POTASSIUM CHLORIDE 20 MEQ: 20 TABLET, EXTENDED RELEASE ORAL at 09:16

## 2021-07-24 RX ADMIN — BUSPIRONE HYDROCHLORIDE 30 MG: 15 TABLET ORAL at 21:14

## 2021-07-24 RX ADMIN — PROPRANOLOL HYDROCHLORIDE 10 MG: 10 TABLET ORAL at 07:03

## 2021-07-24 RX ADMIN — PREGABALIN 100 MG: 50 CAPSULE ORAL at 17:08

## 2021-07-24 RX ADMIN — QUETIAPINE 75 MG: 50 TABLET ORAL at 16:37

## 2021-07-24 RX ADMIN — ASPIRIN 81 MG CHEWABLE TABLET 81 MG: 81 TABLET CHEWABLE at 09:15

## 2021-07-24 RX ADMIN — PHENYTOIN 2 MG: 125 SUSPENSION ORAL at 09:16

## 2021-07-24 RX ADMIN — BUSPIRONE HYDROCHLORIDE 30 MG: 15 TABLET ORAL at 09:16

## 2021-07-24 RX ADMIN — PAROXETINE HYDROCHLORIDE 40 MG: 20 TABLET, FILM COATED ORAL at 09:16

## 2021-07-24 RX ADMIN — ACETAMINOPHEN 975 MG: 325 TABLET ORAL at 21:15

## 2021-07-24 RX ADMIN — OLANZAPINE 5 MG: 5 TABLET, FILM COATED ORAL at 18:12

## 2021-07-24 RX ADMIN — PANTOPRAZOLE SODIUM 20 MG: 20 TABLET, DELAYED RELEASE ORAL at 06:12

## 2021-07-24 RX ADMIN — MORPHINE SULFATE 15 MG: 15 TABLET, EXTENDED RELEASE ORAL at 09:19

## 2021-07-24 RX ADMIN — ATORVASTATIN CALCIUM 40 MG: 40 TABLET, FILM COATED ORAL at 16:38

## 2021-07-24 NOTE — PROGRESS NOTES
Progress Note - Behavioral Health     Samantha Hernandez 60 y o  female MRN: [id number]   Unit/Bed#: [facility]56-01 Encounter: [id number]    Behavior over the last 24 hours: liset Singh is a 58-year-old female with a history of bipolar 2 disorder who presents for psychiatric follow-up  Patient interviewed at bedside and is calm and cooperative upon approach  Staff reports that she continues to be isolative to her room, depressed and very anxious  She now only moves around the unit in a wheelchair  Upon approach, she has visible lip-smacking and abnormal tongue movements consistent with her diagnosis of tardive dyskinesia  She complains of chronic back pain and states he MS Contin has been no help    She skipped breakfast this morning because she was too anxious to eat  Endorses normal sleep and plans on attending groups later today  No other acute concerns      Sleep: normal  Appetite: decreased  Medication side effects: No   ROS: reports back pain, all other systems are negative    Mental Status Evaluation:    Appearance:  age appropriate, casually dressed, adequate grooming   Behavior:  cooperative, calm, restless and fidgety   Speech:  soft, dysarthric   Mood:  anxious   Affect:  blunted   Thought Process:  organized, linear, concrete   Associations: concrete associations   Thought Content:  no overt delusions, intrusive thoughts, ruminating thoughts   Perceptual Disturbances: no auditory hallucinations, no visual hallucinations   Risk Potential: Suicidal ideation - None at present  Homicidal ideation - None at present  Potential for aggression - No   Sensorium:  oriented to person, place and time/date   Memory:  recent and remote memory grossly intact   Consciousness:  alert and awake   Attention/Concentration: attention span and concentration are age appropriate   Insight:  fair   Judgment: fair   Gait/Station: in bed, also uses wheelchair   Motor Activity: abnormal movement noted: dyskinetic oral movements present     Vital signs in last 24 hours:    Temp:  [97 1 °F (36 2 °C)-97 8 °F (36 6 °C)] 97 7 °F (36 5 °C)  HR:  [61-69] 61  Resp:  [15-16] 16  BP: (115-125)/(60-80) 125/60    Laboratory results: I have personally reviewed all pertinent laboratory/tests results    Most Recent Labs:   Lab Results   Component Value Date    WBC 5 60 07/23/2021    RBC 4 03 07/23/2021    HGB 12 2 07/23/2021    HCT 36 3 07/23/2021     07/23/2021    RDW 14 8 07/23/2021    NEUTROABS 3 40 07/23/2021    SODIUM 140 07/17/2021    K 4 0 07/17/2021     07/17/2021    CO2 25 07/17/2021    BUN 11 07/17/2021    CREATININE 0 63 07/17/2021    GLUC 96 07/17/2021    CALCIUM 8 9 07/17/2021    AST 12 07/11/2021    ALT 18 07/11/2021    ALKPHOS 104 07/11/2021    TP 7 8 07/11/2021    ALB 3 7 07/11/2021    TBILI 0 67 07/11/2021    CHOLESTEROL 215 (H) 02/13/2020    HDL 84 02/13/2020    TRIG 74 02/13/2020    LDLCALC 116 (H) 02/13/2020    [place] 131 02/13/2020    AMMONIA <10 (L) 06/27/2021    [id number] 2 060 06/30/2021    FREET4 0 80 02/13/2020    PREGSERUM Negative 02/22/2014    RPR Non-Reactive 06/29/2021    HGBA1C 4 8 02/08/2021    EAG 91 02/08/2021       Progress Toward Goals: progressing    Assessment/Plan   Principal Problem:    Bipolar II disorder ([address] )  Active Problems:    Chronic bilateral low back pain with bilateral sciatica    Chronic pain syndrome    Essential hypertension    History of hypokalemia    Opioid dependence (HCC)    Panic disorder without agoraphobia    Post traumatic stress disorder    Tardive dyskinesia    Primary osteoarthritis of both knees    Multiple closed fractures of metatarsal bone of right foot    History of CVA (cerebrovascular accident)    Medical clearance for psychiatric admission    Mixed hyperlipidemia    Leukopenia      Recommended Treatment:     Planned medication and treatment changes:     All current active medications have been reviewed  Encourage group therapy, milieu therapy and occupational therapy  Behavioral Health checks every 7 minutes    Patient continues to complain of severe anxious symptoms despite appropriate dosing of multiple anxiolytic medications  Seroquel was just increased to 100mg qHS and 75mg TID and she continues on BuSpar 30mg TID, Paxil 40mg daily and Atarax 50mg TID  Offered reassurance that medication adjustments can sometimes take several days to take effect  If still severely anxious next week, could consider addition of Remeron which may not only improve anxiety but may increase appetite as well  Continue Ingrezza for TD      Current Facility-Administered Medications   Medication Dose Route Frequency Provider Last Rate    acetaminophen  975 mg Oral Q8H [address] [facility]      aluminum-magnesium hydroxide-simethicone  30 mL Oral Q4H PRN [facility]      amLODIPine  5 mg Oral Daily [facility]      aspirin  81 mg Oral Daily Triny Callahan, [facility]      atorvastatin  40 mg Oral Daily With [facility] Colvin      benztropine  1 mg Intramuscular Q4H PRN Max 6/day [facility]      benztropine  0 5 mg Oral Q4H PRN Max 6/day [facility]      busPIRone  30 mg Oral TID Ric Amos MD      Diclofenac Sodium  2 g Topical 4x Daily [facility]      enoxaparin  40 mg Subcutaneous Daily [facility]      folic acid  3,821 mcg Oral Daily [facility]ff[facility]      hydrOXYzine HCL  50 mg Oral TID Ric Amos MD      LORazepam  1 mg Intramuscular Q6H PRN Max 3/day [facility]      mirtazapine  7 5 mg Oral HS PRN Triny Callahan, [facility]      morphine  15 mg Oral Q12H [address] [facility]      nicotine polacrilex  2 mg Oral Q2H PRN Triny Callahan, [facility]      OLANZapine  5 mg Intramuscular Q3H PRN Max 3/day Triny Callahan, [facility]      OLANZapine  5 mg Oral Q3H PRN Max 3/day Triny Callahan, [facility]      ondansetron  4 mg Oral Q6H PRN [facility] Coffman      pantoprazole  20 mg Oral Early Morning Aide Reyes Davida, [facility]      PARoxetine  40 mg Oral Daily Elisa Esposito MD      polyethylene glycol  17 g Oral Daily PRN Akhil Vargas MD      potassium chloride  20 mEq Oral Daily Evlyn Many, CRNP      prazosin  2 mg Oral Daily Evlyn Many, CRNP      pregabalin  100 mg Oral BID Evlyn Many, CRNP      propranolol  10 mg Oral BID before breakfast/lunch Elisa Esposito MD      QUEtiapine  100 mg Oral HS Evlyn Many, CRNP      QUEtiapine  75 mg Oral TID Elisa Esposito MD      senna-docusate sodium  1 tablet Oral Daily PRN Evlyn Many, CRNP      Valbenazine Tosylate  80 mg Oral Daily Evlyn Many, CRNP       Risks / Benefits of Treatment:    Risks, benefits, and possible side effects of medications explained to patient and patient verbalizes understanding and agreement for treatment  Counseling / Coordination of Care:    Patient's progress discussed with staff in treatment team meeting  Medications, treatment progress and treatment plan reviewed with patient      Elbert Andrews PA-C 07/24/21

## 2021-07-24 NOTE — PLAN OF CARE
Problem: Alteration in Thoughts and Perception  Goal: Treatment Goal: Gain control of psychotic behaviors/thinking, reduce/eliminate presenting symptoms and demonstrate improved reality functioning upon discharge  Outcome: Progressing  Goal: Verbalize thoughts and feelings  Description: Interventions:  - Promote a nonjudgmental and trusting relationship with the patient through active listening and therapeutic communication  - Assess patient's level of functioning, behavior and potential for risk  - Engage patient in 1 on 1 interactions  - Encourage patient to express fears, feelings, frustrations, and discuss symptoms    - [place] patient to reality, help patient recognize reality-based thinking   - Administer medications as ordered and assess for potential side effects  - Provide the patient education related to the signs and symptoms of the illness and desired effects of prescribed medications  Outcome: Progressing  Goal: Refrain from acting on delusional thinking/internal stimuli  Description: Interventions:  - Monitor patient closely, per order   - Utilize least restrictive measures   - Set reasonable limits, give positive feedback for acceptable   - Administer medications as ordered and monitor of potential side effects  Outcome: Progressing  Goal: Agree to be compliant with medication regime, as prescribed and report medication side effects  Description: Interventions:  - Offer appropriate PRN medication and supervise ingestion; conduct AIMS, as needed   Outcome: Progressing  Goal: Recognize dysfunctional thoughts, communicate reality-based thoughts at the time of discharge  Description: Interventions:  - Provide medication and psycho-education to assist patient in compliance and developing insight into his/her illness   Outcome: Progressing  Goal: Complete daily ADLs, including personal hygiene independently, as able  Description: Interventions:  - Observe, teach, and assist patient with ADLS  - Monitor and promote a balance of rest/activity, with adequate nutrition and elimination   Outcome: Progressing     Problem: Ineffective Coping  Goal: Identifies ineffective coping skills  Outcome: Progressing  Goal: Identifies healthy coping skills  Outcome: Progressing  Goal: Demonstrates healthy coping skills  Outcome: Progressing  Goal: Patient/Family participate in treatment and DC plans  Description: Interventions:  - Provide therapeutic environment  Outcome: Progressing  Goal: Patient/Family verbalizes awareness of resources  Outcome: Progressing     Problem: Anxiety  Goal: Anxiety is at manageable level  Description: Interventions:  - Assess and monitor patient's anxiety level  - Monitor for signs and symptoms (heart palpitations, chest pain, shortness of breath, headaches, nausea, feeling jumpy, restlessness, irritable, apprehensive)  - Collaborate with interdisciplinary team and initiate plan and interventions as ordered  - [place] patient to unit/surroundings  - Explain treatment plan  - Encourage participation in care  - Encourage verbalization of concerns/fears  - Identify coping mechanisms  - Assist in developing anxiety-reducing skills  - Administer/offer alternative therapies  - Limit or eliminate stimulants  Outcome: Progressing     Problem: BEHAVIOR  Goal: Pt/Family maintain appropriate behavior and adhere to behavioral management agreement, if implemented  Description: INTERVENTIONS:  - Assess the family dynamic   - Encourage verbalization of thoughts and concerns in a socially appropriate manner  - Assess patient/family's coping skills and non-compliant behavior (including use of illegal substances)    - Utilize positive, consistent limit setting strategies supporting safety of patient, staff and others  - Initiate consult with Case Management, Spiritual Care or other ancillary services as appropriate  - If a patient's/visitor's behavior jeopardizes the safety of the patient, staff, or others, refer to organization procedure     - Notify Security of behavior or suspected illegal substances which indicate the need for search of the patient and/or belongings  - Encourage participation in the decision making process about a behavioral management agreement; implement if patient meets criteria  Outcome: Progressing     Problem: ANXIETY  Goal: Will report anxiety at manageable levels  Description: INTERVENTIONS:  - Administer medication as ordered  - Teach and encourage coping skills  - Provide emotional support  - Assess patient/family for anxiety and ability to cope  Outcome: Progressing  Goal: By discharge: Patient will verbalize 2 strategies to deal with anxiety  Description: Interventions:  - Identify any obvious source/trigger to anxiety  - Staff will assist patient in applying identified coping technique/skills  - Encourage attendance of scheduled groups and activities  Outcome: Progressing     Problem: PAIN - ADULT  Goal: Verbalizes/displays adequate comfort level or baseline comfort level  Description: Interventions:  - Encourage patient to monitor pain and request assistance  - Assess pain using appropriate pain scale  - Administer analgesics based on type and severity of pain and evaluate response  - Implement non-pharmacological measures as appropriate and evaluate response  - Consider cultural and social influences on pain and pain management  - Notify physician/advanced practitioner if interventions unsuccessful or patient reports new pain  Outcome: Progressing

## 2021-07-24 NOTE — NURSING NOTE
Patient calm and cooperative, visible throughout shift  Social with staff and peers; [place]on on approach  Reports 5/10 depression and 3/4 anxiety  Denies any needs at this time  Compliant with meals and medications except pt refused afternoon Tylenol  Will continue to monitor  Patient approached nurses station requesting PRN medication for agitation  Administered PRN Zyprexa 5mg at 1812  Will continue to monitor

## 2021-07-25 PROCEDURE — 99233 SBSQ HOSP IP/OBS HIGH 50: CPT | Performed by: PHYSICIAN ASSISTANT

## 2021-07-25 RX ADMIN — PAROXETINE HYDROCHLORIDE 40 MG: 20 TABLET, FILM COATED ORAL at 08:34

## 2021-07-25 RX ADMIN — HYDROXYZINE HYDROCHLORIDE 50 MG: 50 TABLET, FILM COATED ORAL at 20:29

## 2021-07-25 RX ADMIN — PROPRANOLOL HYDROCHLORIDE 10 MG: 10 TABLET ORAL at 06:23

## 2021-07-25 RX ADMIN — QUETIAPINE 75 MG: 50 TABLET ORAL at 16:44

## 2021-07-25 RX ADMIN — ASPIRIN 81 MG CHEWABLE TABLET 81 MG: 81 TABLET CHEWABLE at 08:34

## 2021-07-25 RX ADMIN — BUSPIRONE HYDROCHLORIDE 30 MG: 15 TABLET ORAL at 20:29

## 2021-07-25 RX ADMIN — POTASSIUM CHLORIDE 20 MEQ: 20 TABLET, EXTENDED RELEASE ORAL at 08:35

## 2021-07-25 RX ADMIN — BUSPIRONE HYDROCHLORIDE 30 MG: 15 TABLET ORAL at 08:34

## 2021-07-25 RX ADMIN — HYDROXYZINE HYDROCHLORIDE 50 MG: 50 TABLET, FILM COATED ORAL at 16:45

## 2021-07-25 RX ADMIN — DICLOFENAC SODIUM 2 G: 10 GEL TOPICAL at 08:36

## 2021-07-25 RX ADMIN — QUETIAPINE 75 MG: 50 TABLET ORAL at 08:35

## 2021-07-25 RX ADMIN — PROPRANOLOL HYDROCHLORIDE 10 MG: 10 TABLET ORAL at 12:31

## 2021-07-25 RX ADMIN — FOLIC ACID 1000 MCG: 1 TABLET ORAL at 08:35

## 2021-07-25 RX ADMIN — MORPHINE SULFATE 15 MG: 15 TABLET, EXTENDED RELEASE ORAL at 08:34

## 2021-07-25 RX ADMIN — VALBENAZINE 80 MG: 80 CAPSULE ORAL at 08:36

## 2021-07-25 RX ADMIN — HYDROXYZINE HYDROCHLORIDE 50 MG: 50 TABLET, FILM COATED ORAL at 08:35

## 2021-07-25 RX ADMIN — PREGABALIN 100 MG: 50 CAPSULE ORAL at 17:11

## 2021-07-25 RX ADMIN — ACETAMINOPHEN 975 MG: 325 TABLET ORAL at 21:02

## 2021-07-25 RX ADMIN — QUETIAPINE 75 MG: 50 TABLET ORAL at 20:29

## 2021-07-25 RX ADMIN — BUSPIRONE HYDROCHLORIDE 30 MG: 15 TABLET ORAL at 16:44

## 2021-07-25 RX ADMIN — MORPHINE SULFATE 15 MG: 15 TABLET, EXTENDED RELEASE ORAL at 20:29

## 2021-07-25 RX ADMIN — PHENYTOIN 2 MG: 125 SUSPENSION ORAL at 08:34

## 2021-07-25 RX ADMIN — ACETAMINOPHEN 975 MG: 325 TABLET ORAL at 06:22

## 2021-07-25 RX ADMIN — AMLODIPINE BESYLATE 5 MG: 5 TABLET ORAL at 08:35

## 2021-07-25 RX ADMIN — PANTOPRAZOLE SODIUM 20 MG: 20 TABLET, DELAYED RELEASE ORAL at 06:23

## 2021-07-25 RX ADMIN — ATORVASTATIN CALCIUM 40 MG: 40 TABLET, FILM COATED ORAL at 16:45

## 2021-07-25 RX ADMIN — ACETAMINOPHEN 975 MG: 325 TABLET ORAL at 14:45

## 2021-07-25 RX ADMIN — QUETIAPINE FUMARATE 100 MG: 100 TABLET ORAL at 21:02

## 2021-07-25 RX ADMIN — PREGABALIN 100 MG: 50 CAPSULE ORAL at 08:34

## 2021-07-25 RX ADMIN — MIRTAZAPINE 7.5 MG: 7.5 TABLET, FILM COATED ORAL at 00:26

## 2021-07-25 NOTE — PLAN OF CARE
Problem: Alteration in Thoughts and Perception  Goal: Treatment Goal: Gain control of psychotic behaviors/thinking, reduce/eliminate presenting symptoms and demonstrate improved reality functioning upon discharge  Outcome: Progressing  Goal: Verbalize thoughts and feelings  Description: Interventions:  - Promote a nonjudgmental and trusting relationship with the patient through active listening and therapeutic communication  - Assess patient's level of functioning, behavior and potential for risk  - Engage patient in 1 on 1 interactions  - Encourage patient to express fears, feelings, frustrations, and discuss symptoms    - [place] patient to reality, help patient recognize reality-based thinking   - Administer medications as ordered and assess for potential side effects  - Provide the patient education related to the signs and symptoms of the illness and desired effects of prescribed medications  Outcome: Progressing  Goal: Refrain from acting on delusional thinking/internal stimuli  Description: Interventions:  - Monitor patient closely, per order   - Utilize least restrictive measures   - Set reasonable limits, give positive feedback for acceptable   - Administer medications as ordered and monitor of potential side effects  Outcome: Progressing  Goal: Agree to be compliant with medication regime, as prescribed and report medication side effects  Description: Interventions:  - Offer appropriate PRN medication and supervise ingestion; conduct AIMS, as needed   Outcome: Progressing  Goal: Recognize dysfunctional thoughts, communicate reality-based thoughts at the time of discharge  Description: Interventions:  - Provide medication and psycho-education to assist patient in compliance and developing insight into his/her illness   Outcome: Progressing  Goal: Complete daily ADLs, including personal hygiene independently, as able  Description: Interventions:  - Observe, teach, and assist patient with ADLS  - Monitor and promote a balance of rest/activity, with adequate nutrition and elimination   Outcome: Progressing     Problem: Ineffective Coping  Goal: Identifies ineffective coping skills  Outcome: Progressing  Goal: Identifies healthy coping skills  Outcome: Progressing  Goal: Demonstrates healthy coping skills  Outcome: Progressing  Goal: Patient/Family participate in treatment and DC plans  Description: Interventions:  - Provide therapeutic environment  Outcome: Progressing  Goal: Patient/Family verbalizes awareness of resources  Outcome: Progressing     Problem: Anxiety  Goal: Anxiety is at manageable level  Description: Interventions:  - Assess and monitor patient's anxiety level  - Monitor for signs and symptoms (heart palpitations, chest pain, shortness of breath, headaches, nausea, feeling jumpy, restlessness, irritable, apprehensive)  - Collaborate with interdisciplinary team and initiate plan and interventions as ordered  - [place] patient to unit/surroundings  - Explain treatment plan  - Encourage participation in care  - Encourage verbalization of concerns/fears  - Identify coping mechanisms  - Assist in developing anxiety-reducing skills  - Administer/offer alternative therapies  - Limit or eliminate stimulants  Outcome: Progressing     Problem: BEHAVIOR  Goal: Pt/Family maintain appropriate behavior and adhere to behavioral management agreement, if implemented  Description: INTERVENTIONS:  - Assess the family dynamic   - Encourage verbalization of thoughts and concerns in a socially appropriate manner  - Assess patient/family's coping skills and non-compliant behavior (including use of illegal substances)    - Utilize positive, consistent limit setting strategies supporting safety of patient, staff and others  - Initiate consult with Case Management, Spiritual Care or other ancillary services as appropriate  - If a patient's/visitor's behavior jeopardizes the safety of the patient, staff, or others, refer to organization procedure     - Notify Security of behavior or suspected illegal substances which indicate the need for search of the patient and/or belongings  - Encourage participation in the decision making process about a behavioral management agreement; implement if patient meets criteria  Outcome: Progressing     Problem: ANXIETY  Goal: Will report anxiety at manageable levels  Description: INTERVENTIONS:  - Administer medication as ordered  - Teach and encourage coping skills  - Provide emotional support  - Assess patient/family for anxiety and ability to cope  Outcome: Progressing  Goal: By discharge: Patient will verbalize 2 strategies to deal with anxiety  Description: Interventions:  - Identify any obvious source/trigger to anxiety  - Staff will assist patient in applying identified coping technique/skills  - Encourage attendance of scheduled groups and activities  Outcome: Progressing     Problem: PAIN - ADULT  Goal: Verbalizes/displays adequate comfort level or baseline comfort level  Description: Interventions:  - Encourage patient to monitor pain and request assistance  - Assess pain using appropriate pain scale  - Administer analgesics based on type and severity of pain and evaluate response  - Implement non-pharmacological measures as appropriate and evaluate response  - Consider cultural and social influences on pain and pain management  - Notify physician/advanced practitioner if interventions unsuccessful or patient reports new pain  Outcome: Progressing

## 2021-07-25 NOTE — PROGRESS NOTES
Progress Note - Behavioral Health     Samantha Escalera 60 y o  female MRN: [id number]   Unit/Bed#: [facility] 656-01 Encounter: [id number]    Behavior over the last 24 hours: liset Singh is a 57-year-old female with a history of bipolar 2 disorder who presents for psychiatric follow-up  Staff reports that she continues to be medication seeking and had a behavioral outburst last night if not getting her Tylenol because she was sleeping at that time  She was easily redirectable  Upon approach, she is dysphoric and anxious appearing and endorsing severe anxious symptoms  She feels her medications are not helpful and she is requesting Ativan, stating this is the only thing that has helped my anxiety  None of the other medications do anything for me    She is having some difficulty getting out of her wheelchair and onto the toilet  Highly somatic today  Denies SI HI  Denies AH and VH      Sleep: difficulty falling asleep  Appetite: normal  Medication side effects: No   ROS: all other systems are negative    Mental Status Evaluation:    Appearance:  age appropriate, casually dressed, adequate grooming   Behavior:  cooperative, restless and fidgety   Speech:  normal rate and volume   Mood:  dysphoric, anxious   Affect:  appropriate, reactive   Thought Process:  organized, linear, perseverative   Associations: concrete associations   Thought Content:  no overt delusions, negative thoughts, intrusive thoughts, ruminating thoughts   Perceptual Disturbances: no auditory hallucinations, no visual hallucinations   Risk Potential: Suicidal ideation - None at present, contracts for safety on the unit  Homicidal ideation - None at present  Potential for aggression - No   Sensorium:  oriented to person, place and time/date   Memory:  recent and remote memory grossly intact   Consciousness:  alert and awake   Attention/Concentration: attention span and concentration are age appropriate   Insight:  limited   Judgment: partial   Gait/Station: in bed, also uses wheelchair   Motor Activity: abnormal movement noted: dyskinetic oral movements present, hand tremor present     Vital signs in last 24 hours:    Temp:  [97 2 °F (36 2 °C)-97 7 °F (36 5 °C)] 97 4 °F (36 3 °C)  HR:  [65-75] 65  Resp:  [16-18] 16  BP: (118-137)/(64-83) 137/83    Laboratory results: I have personally reviewed all pertinent laboratory/tests results    Most Recent Labs:   Lab Results   Component Value Date    WBC 5 60 07/23/2021    RBC 4 03 07/23/2021    HGB 12 2 07/23/2021    HCT 36 3 07/23/2021     07/23/2021    RDW 14 8 07/23/2021    NEUTROABS 3 40 07/23/2021    SODIUM 140 07/17/2021    K 4 0 07/17/2021     07/17/2021    CO2 25 07/17/2021    BUN 11 07/17/2021    CREATININE 0 63 07/17/2021    GLUC 96 07/17/2021    CALCIUM 8 9 07/17/2021    AST 12 07/11/2021    ALT 18 07/11/2021    ALKPHOS 104 07/11/2021    TP 7 8 07/11/2021    ALB 3 7 07/11/2021    TBILI 0 67 07/11/2021    CHOLESTEROL 215 (H) 02/13/2020    HDL 84 02/13/2020    TRIG 74 02/13/2020    [address] 116 (H) 02/13/2020    [place] 131 02/13/2020    AMMONIA <10 (L) 06/27/2021    FBW4ZNZXXRLG 2 060 06/30/2021    FREET4 0 80 02/13/2020    PREGSERUM Negative 02/22/2014    RPR Non-Reactive 06/29/2021    HGBA1C 4 8 02/08/2021    EAG 91 02/08/2021       Progress Toward Goals: progressing, still very anxious    Assessment/Plan   Principal Problem:    Bipolar II disorder ([facility] [address] )  Active Problems:    Chronic bilateral low back pain with bilateral sciatica    Chronic pain syndrome    Essential hypertension    History of hypokalemia    Opioid dependence (HCC)    Panic disorder without agoraphobia    Post traumatic stress disorder    Tardive dyskinesia    Primary osteoarthritis of both knees    Multiple closed fractures of metatarsal bone of right foot    History of CVA (cerebrovascular accident)    Medical clearance for psychiatric admission    Mixed hyperlipidemia    Leukopenia      Recommended Treatment: Planned medication and treatment changes: All current active medications have been reviewed  Encourage group therapy, milieu therapy and occupational therapy  Behavioral Health checks every 7 minutes    Patient requesting Ativan instead of Atarax  Currently taking MS Contin ER 15mg daily for chronic pain and it would be unnecessarily risky to add a benzodiazepine to a long acting opioid  Already taking max dose of BuSpar plus Paxil 40mg, scheduled Atarax, Seroquel, Lyrica and propranolol, all of which should be helping her anxiety  The only other option I can think of would be to add scheduled Remeron at bedtime; she took Remeron 7 5mg last night as a PRN with good success for initiating sleep  Will defer this decision to her primary psychiatrist, Dr Dominick Morales      Current Facility-Administered Medications   Medication Dose Route Frequency Provider Last Rate    acetaminophen  975 mg Oral Q8H [address] [facility]      aluminum-magnesium hydroxide-simethicone  30 mL Oral Q4H PRN [facility] Co[facility]      amLODIPine  5 mg Oral Daily Triny Callahan, BOBBYNP      aspirin  81 mg Oral Daily Triny Callahan, BOBBYNP      atorvastatin  40 mg Oral Daily With Joseph Arguello, CRNP      benztropine  1 mg Intramuscular Q4H PRN Max 6/day Triny Callahan, MERCY      benztropine  0 5 mg Oral Q4H PRN Max 6/day [facility]      busPIRone  30 mg Oral TID Ric Amos MD      Diclofenac Sodium  2 g Topical 4x Daily [facility]      enoxaparin  40 mg Subcutaneous Daily [facility]      folic acid  6,657 mcg Oral Daily [facility] Coff[facility]      hydrOXYzine HCL  50 mg Oral TID Ric Amos MD      LORazepam  1 mg Intramuscular Q6H PRN Max 3/day [facility] Coff[facility]      mirtazapine  7 5 mg Oral HS PRN Trniy Callahan, MERCY      morphine  15 mg Oral Q12H [address] [facility]      nicotine polacrilex  2 mg Oral Q2H PRN Triny Callahan, MERCY      OLANZapine  5 mg Intramuscular Q3H PRN Max 3/day Aide Reyes Davida, MERCY      OLANZapine  5 mg Oral Q3H PRN Max 3/day Cathye Loupe, MERCY      ondansetron  4 mg Oral Q6H PRN Cathye Loupe, MERCY      pantoprazole  20 mg Oral Early Morning Cathye Loupe, CRNP      PARoxetine  40 mg Oral Daily Bri Stratton MD      polyethylene glycol  17 g Oral Daily PRN Ralph Mayo MD      potassium chloride  20 mEq Oral Daily Cathye Loupe, MERCY      prazosin  2 mg Oral Daily Cathye Loupe, CRERIBERTO      pregabalin  100 mg Oral BID Cathye Loupe, MERCY      propranolol  10 mg Oral BID before breakfast/lunch Bri Stratton MD      QUEtiapine  100 mg Oral HS Cathye Loupe, MERCY      QUEtiapine  75 mg Oral TID Bri Stratton MD      senna-docusate sodium  1 tablet Oral Daily PRN Cathye Loupe, MERCY      Valbenazine Tosylate  80 mg Oral Daily Cathye Loupe, MERCY       Risks / Benefits of Treatment:    Risks, benefits, and possible side effects of medications explained to patient and patient verbalizes understanding and agreement for treatment  Counseling / Coordination of Care:    Patient's progress discussed with staff in treatment team meeting  Medications, treatment progress and treatment plan reviewed with patient      Cedrick Moran PA-C 07/25/21

## 2021-07-25 NOTE — NURSING NOTE
Patient calm and cooperative, visible throughout shift but tends to retreat to her room after meals and snack  Isolative to self  Reports 8/10 depression and 3/4 anxiety  Denies any needs at this time  Compliant with meals and medications  Will continue to monitor

## 2021-07-26 PROCEDURE — 99233 SBSQ HOSP IP/OBS HIGH 50: CPT | Performed by: PHYSICIAN ASSISTANT

## 2021-07-26 RX ORDER — QUETIAPINE FUMARATE 50 MG/1
50 TABLET, FILM COATED ORAL 3 TIMES DAILY
Status: DISCONTINUED | OUTPATIENT
Start: 2021-07-26 | End: 2021-07-28

## 2021-07-26 RX ORDER — BUSPIRONE HYDROCHLORIDE 10 MG/1
20 TABLET ORAL 3 TIMES DAILY
Status: DISCONTINUED | OUTPATIENT
Start: 2021-07-26 | End: 2021-08-04

## 2021-07-26 RX ADMIN — DICLOFENAC SODIUM 2 G: 10 GEL TOPICAL at 17:19

## 2021-07-26 RX ADMIN — ACETAMINOPHEN 975 MG: 325 TABLET ORAL at 14:25

## 2021-07-26 RX ADMIN — HYDROXYZINE HYDROCHLORIDE 50 MG: 50 TABLET, FILM COATED ORAL at 08:26

## 2021-07-26 RX ADMIN — PREGABALIN 100 MG: 50 CAPSULE ORAL at 17:02

## 2021-07-26 RX ADMIN — PROPRANOLOL HYDROCHLORIDE 10 MG: 10 TABLET ORAL at 12:41

## 2021-07-26 RX ADMIN — ENOXAPARIN SODIUM 40 MG: 40 INJECTION SUBCUTANEOUS at 08:34

## 2021-07-26 RX ADMIN — DICLOFENAC SODIUM 2 G: 10 GEL TOPICAL at 08:36

## 2021-07-26 RX ADMIN — PREGABALIN 100 MG: 50 CAPSULE ORAL at 08:27

## 2021-07-26 RX ADMIN — POTASSIUM CHLORIDE 20 MEQ: 20 TABLET, EXTENDED RELEASE ORAL at 08:28

## 2021-07-26 RX ADMIN — ATORVASTATIN CALCIUM 40 MG: 40 TABLET, FILM COATED ORAL at 16:37

## 2021-07-26 RX ADMIN — PAROXETINE HYDROCHLORIDE 40 MG: 20 TABLET, FILM COATED ORAL at 08:26

## 2021-07-26 RX ADMIN — MORPHINE SULFATE 15 MG: 15 TABLET, EXTENDED RELEASE ORAL at 08:27

## 2021-07-26 RX ADMIN — QUETIAPINE 50 MG: 50 TABLET ORAL at 16:37

## 2021-07-26 RX ADMIN — QUETIAPINE FUMARATE 100 MG: 100 TABLET ORAL at 21:08

## 2021-07-26 RX ADMIN — BUSPIRONE HYDROCHLORIDE 20 MG: 10 TABLET ORAL at 16:37

## 2021-07-26 RX ADMIN — PANTOPRAZOLE SODIUM 20 MG: 20 TABLET, DELAYED RELEASE ORAL at 06:08

## 2021-07-26 RX ADMIN — DICLOFENAC SODIUM 2 G: 10 GEL TOPICAL at 21:12

## 2021-07-26 RX ADMIN — ACETAMINOPHEN 975 MG: 325 TABLET ORAL at 06:08

## 2021-07-26 RX ADMIN — QUETIAPINE 75 MG: 50 TABLET ORAL at 08:27

## 2021-07-26 RX ADMIN — HYDROXYZINE HYDROCHLORIDE 50 MG: 50 TABLET, FILM COATED ORAL at 21:08

## 2021-07-26 RX ADMIN — ASPIRIN 81 MG CHEWABLE TABLET 81 MG: 81 TABLET CHEWABLE at 08:27

## 2021-07-26 RX ADMIN — BUSPIRONE HYDROCHLORIDE 30 MG: 15 TABLET ORAL at 08:27

## 2021-07-26 RX ADMIN — BUSPIRONE HYDROCHLORIDE 20 MG: 10 TABLET ORAL at 21:08

## 2021-07-26 RX ADMIN — HYDROXYZINE HYDROCHLORIDE 50 MG: 50 TABLET, FILM COATED ORAL at 16:37

## 2021-07-26 RX ADMIN — QUETIAPINE 50 MG: 50 TABLET ORAL at 21:08

## 2021-07-26 RX ADMIN — MORPHINE SULFATE 15 MG: 15 TABLET, EXTENDED RELEASE ORAL at 21:07

## 2021-07-26 RX ADMIN — ACETAMINOPHEN 975 MG: 325 TABLET ORAL at 21:07

## 2021-07-26 RX ADMIN — FOLIC ACID 1000 MCG: 1 TABLET ORAL at 08:27

## 2021-07-26 RX ADMIN — VALBENAZINE 80 MG: 80 CAPSULE ORAL at 08:31

## 2021-07-26 NOTE — NURSING NOTE
Pt presented with somatic complaints and Bx after receiving her scheduled 2100 meds  Pt requested Remeron which was given last night prn due to pt waking up multiple times and c/o difficulties falling asleep  At HS this evening, Pt requested same and was advised to give her scheduled meds time before trying prn for sleep  Pt continued with med seeking Bx and episodes of crying spells  Pt redirected to her room and HS encouraged  Pt in bed now, requested more warm blankets and was provided same  Pt appears to be sleeping at this moment  Will monitor frequently for safety and support through the  night  Patient refused

## 2021-07-26 NOTE — NURSING NOTE
Patient is calm and isolative to room  She is cooperative with assessment questions  She reports anxiety 4-4 and depression 5-10  Med and meal compliant

## 2021-07-26 NOTE — PROGRESS NOTES
Pt attended 1 afternoon group  Pt brighter affect and more visible in afternoon  Pt able to make needs known      07/26/21 1330   Activity/Group Checklist   Group Other (Comment)  (mindfulness and meditation)   Attendance Attended   Attendance Duration (min) 31-45   Interactions Interacted appropriately   Affect/Mood Appropriate   Goals Achieved Identified feelings; Discussed coping strategies; Able to listen to others; Able to engage in interactions; Able to reflect/comment on own behavior

## 2021-07-26 NOTE — PROGRESS NOTES
Progress Note - Behavioral Health   Samantha Tapia 60 y o  female MRN: [id number]  Unit/Bed#: [facility] 101-08 Encounter: [id number]    Samantha was seen for follow-up, chart reviewed and discussed with treatment team   Nursing staff notes she was uncooperative with ADLs today  Isolative to self and to room  Patient was seen in her room and reports significant anxiety  Appears visibly anxious and tremulous  States she continues to have frequent panic attacks which are disruptive to her  Reports that she feels easily overwhelmed and "shaky""  Also states that she has urinary frequency which is escalating her anxiety  States that this has been ongoing and denies any dysuria or hematuria  States that she did not sleep well last night and awoke with a panic attack  Reports that her appetite is variable  Reports that she feels as though she is unable to attend groups due to her high anxiety  Somatically preoccupied  Has been compliant with her medications  Reports that she has been on BuSpar for some time and does not feel that this has been helpful for her anxiety  Currently denies any suicidal ideation  Denies any auditory or visual hallucinations      Behavior over the last 24 hours:  unchanged  Sleep: insomnia  Appetite: poor  Medication side effects: No  ROS: Urinary frequency  /82 (BP Location: Left arm)   Pulse 58   Temp 97 9 °F (36 6 °C) (Temporal)   Resp 17   Ht 5' 1" (1 549 m)   Wt 88 7 kg (195 lb 8 oz)   SpO2 100%   BMI 36 94 kg/m²     Medications:   Current Facility-Administered Medications   Medication Dose Route Frequency    acetaminophen (TYLENOL) tablet 975 mg  975 mg Oral Q8H [address]    aluminum-magnesium hydroxide-simethicone (MYLANTA) oral suspension 30 mL  30 mL Oral Q4H PRN    amLODIPine (NORVASC) tablet 5 mg  5 mg Oral Daily    aspirin chewable tablet 81 mg  81 mg Oral Daily    atorvastatin (LIPITOR) tablet 40 mg  40 mg Oral Daily With Dinner    benztropine (COGENTIN) injection 1 mg  1 mg Intramuscular Q4H PRN Max 6/day    benztropine (COGENTIN) tablet 0 5 mg  0 5 mg Oral Q4H PRN Max 6/day    busPIRone (BUSPAR) tablet 30 mg  30 mg Oral TID    Diclofenac Sodium (VOLTAREN) 1 % topical gel 2 g  2 g Topical 4x Daily    enoxaparin (LOVENOX) subcutaneous injection 40 mg  40 mg Subcutaneous Daily    folic acid (FOLVITE) tablet 1,000 mcg  1,000 mcg Oral Daily    hydrOXYzine HCL (ATARAX) tablet 50 mg  50 mg Oral TID    LORazepam (ATIVAN) injection 1 mg  1 mg Intramuscular Q6H PRN Max 3/day    mirtazapine (REMERON) tablet 7 5 mg  7 5 mg Oral HS PRN    morphine (MS CONTIN) ER tablet 15 mg  15 mg Oral Q12H [facility]    nicotine polacrilex (NICORETTE) gum 2 mg  2 mg Oral Q2H PRN    OLANZapine (ZyPREXA) IM injection 5 mg  5 mg Intramuscular Q3H PRN Max 3/day    OLANZapine (ZyPREXA) tablet 5 mg  5 mg Oral Q3H PRN Max 3/day    ondansetron (ZOFRAN-ODT) dispersible tablet 4 mg  4 mg Oral Q6H PRN    pantoprazole (PROTONIX) EC tablet 20 mg  20 mg Oral Early Morning    PARoxetine (PAXIL) tablet 40 mg  40 mg Oral Daily    polyethylene glycol (MIRALAX) packet 17 g  17 g Oral Daily PRN    potassium chloride (K-DUR,KLOR-CON) CR tablet 20 mEq  20 mEq Oral Daily    prazosin (MINIPRESS) capsule 2 mg  2 mg Oral Daily    pregabalin (LYRICA) capsule 100 mg  100 mg Oral BID    propranolol (INDERAL) tablet 10 mg  10 mg Oral BID before breakfast/lunch    QUEtiapine (SEROquel) tablet 100 mg  100 mg Oral HS    QUEtiapine (SEROquel) tablet 75 mg  75 mg Oral TID    senna-docusate sodium (SENOKOT S) 8 6-50 mg per tablet 1 tablet  1 tablet Oral Daily PRN    Valbenazine Tosylate CAPS 80 mg  80 mg Oral Daily       Labs:   Admission on 07/15/2021   Component Date Value Ref Range Status    Sodium 07/17/2021 140  137 - 147 mmol/L Final    Potassium 07/17/2021 4 0  3 6 - 5 0 mmol/L Final    Chloride 07/17/2021 106  97 - 108 mmol/L Final    CO2 07/17/2021 25  22 - 30 mmol/L Final    ANION GAP 07/17/2021 9  5 - 14 mmol/L Final    BUN 07/17/2021 11  5 - 25 mg/dL Final    Creatinine 07/17/2021 0 63  0 60 - 1 20 mg/dL Final    Glucose 07/17/2021 96  70 - 99 mg/dL Final    Glucose, Fasting 07/17/2021 96  70 - 99 mg/dL Final    Calcium 07/17/2021 8 9  8 4 - 10 2 mg/dL Final    eGFR 07/17/2021 115  >60 ml/min/1 73sq m Final    WBC 07/18/2021 4 00* 4 50 - 11 00 Thousand/uL Final    RBC 07/18/2021 4 24  4 00 - 5 20 Million/uL Final    Hemoglobin 07/18/2021 12 6  12 0 - 16 0 g/dL Final    Hematocrit 07/18/2021 37 9  36 0 - 46 0 % Final    MCV 07/18/2021 90  80 - 100 fL Final    MCH 07/18/2021 29 6  26 0 - 34 0 pg Final    MCHC 07/18/2021 33 1  31 0 - 36 0 g/dL Final    RDW 07/18/2021 14 8  <15 3 % Final    MPV 07/18/2021 7 4* 8 9 - 12 7 fL Final    Platelets 69/57/9541 300  150 - 450 Thousands/uL Final    Neutrophils Relative 07/18/2021 42* 45 - 65 % Final    Lymphocytes Relative 07/18/2021 44  25 - 45 % Final    Monocytes Relative 07/18/2021 8  1 - 10 % Final    Eosinophils Relative 07/18/2021 4  0 - 6 % Final    Basophils Relative 07/18/2021 1  0 - 1 % Final    Neutrophils Absolute 07/18/2021 1 70* 1 80 - 7 80 Thousands/µL Final    Lymphocytes Absolute 07/18/2021 1 80  0 50 - 4 00 Thousands/µL Final    Monocytes Absolute 07/18/2021 0 30  0 20 - 0 90 Thousand/µL Final    Eosinophils Absolute 07/18/2021 0 20  0 00 - 0 40 Thousand/µL Final    Basophils Absolute 07/18/2021 0 00  0 00 - 0 10 Thousands/µL Final    WBC 07/20/2021 6 10  4 50 - 11 00 Thousand/uL Final    RBC 07/20/2021 4 25  4 00 - 5 20 Million/uL Final    Hemoglobin 07/20/2021 12 7  12 0 - 16 0 g/dL Final    Hematocrit 07/20/2021 38 3  36 0 - 46 0 % Final    MCV 07/20/2021 90  80 - 100 fL Final    MCH 07/20/2021 30 0  26 0 - 34 0 pg Final    MCHC 07/20/2021 33 3  31 0 - 36 0 g/dL Final    RDW 07/20/2021 14 8  <15 3 % Final    MPV 07/20/2021 7 3* 8 9 - 12 7 fL Final    Platelets 23/69/6274 301  150 - 450 Thousands/uL Final    Neutrophils Relative 07/20/2021 62  45 - 65 % Final    Lymphocytes Relative 07/20/2021 28  25 - 45 % Final    Monocytes Relative 07/20/2021 7  1 - 10 % Final    Eosinophils Relative 07/20/2021 1  0 - 6 % Final    Basophils Relative 07/20/2021 1  0 - 1 % Final    Neutrophils Absolute 07/20/2021 3 80  1 80 - 7 80 Thousands/µL Final    Lymphocytes Absolute 07/20/2021 1 70  0 50 - 4 00 Thousands/µL Final    Monocytes Absolute 07/20/2021 0 40  0 20 - 0 90 Thousand/µL Final    Eosinophils Absolute 07/20/2021 0 10  0 00 - 0 40 Thousand/µL Final    Basophils Absolute 07/20/2021 0 10  0 00 - 0 10 Thousands/µL Final    Color, UA 07/20/2021 [place]* Straw, Yellow, Pale Yellow Final    Clarity, UA 07/20/2021 Clear  Clear, Other Final    Specific Gravity, UA 07/20/2021 1 005  1 003 - 1 040 Final    pH, UA 07/20/2021 7 0  4 5, 5 0, 5 5, 6 0, 6 5, 7 0, 7 5, 8 0 Final    Leukocytes, UA 07/20/2021 25 0* Negative Final    Nitrite, UA 07/20/2021 Negative  Negative Final    Protein, UA 07/20/2021 Negative  Negative mg/dl Final    Glucose, UA 07/20/2021 Negative  Negative mg/dl Final    Ketones, UA 07/20/2021 5 (Trace)* Negative mg/dl Final    Bilirubin, UA 07/20/2021 Negative  Negative Final    Blood, UA 07/20/2021 Negative  Negative Final    UROBILINOGEN UA 07/20/2021 Negative  1 0, Negative mg/dL Final    RBC, UA 07/20/2021 None Seen  None Seen, 0-1, 1-2, 2-4, 0-5 /hpf Final    WBC, UA 07/20/2021 1-2  None Seen, 0-1, 1-2, 0-5, 2-4 /hpf Final    Epithelial Cells 07/20/2021 Occasional  None Seen, Occasional /hpf Final    Bacteria, UA 07/20/2021 Occasional  None Seen, Occasional /hpf Final    WBC 07/23/2021 5 60  4 50 - 11 00 Thousand/uL Final    RBC 07/23/2021 4 03  4 00 - 5 20 Million/uL Final    Hemoglobin 07/23/2021 12 2  12 0 - 16 0 g/dL Final    Hematocrit 07/23/2021 36 3  36 0 - 46 0 % Final    MCV 07/23/2021 90  80 - 100 fL Final    MCH 07/23/2021 30 3  26 0 - 34 0 pg Final    MCHC 07/23/2021 33 7 31 0 - 36 0 g/dL Final    RDW 07/23/2021 14 8  <15 3 % Final    MPV 07/23/2021 7 4* 8 9 - 12 7 fL Final    Platelets 09/14/5065 263  150 - 450 Thousands/uL Final    Neutrophils Relative 07/23/2021 60  45 - 65 % Final    Lymphocytes Relative 07/23/2021 30  25 - 45 % Final    Monocytes Relative 07/23/2021 6  1 - 10 % Final    Eosinophils Relative 07/23/2021 2  0 - 6 % Final    Basophils Relative 07/23/2021 1  0 - 1 % Final    Neutrophils Absolute 07/23/2021 3 40  1 80 - 7 80 Thousands/µL Final    Lymphocytes Absolute 07/23/2021 1 70  0 50 - 4 00 Thousands/µL Final    Monocytes Absolute 07/23/2021 0 40  0 20 - 0 90 Thousand/µL Final    Eosinophils Absolute 07/23/2021 0 10  0 00 - 0 40 Thousand/µL Final    Basophils Absolute 07/23/2021 0 00  0 00 - 0 10 Thousands/µL Final       Mental Status Evaluation:  Appearance:  age appropriate and casually dressed   Behavior:  restless and fidgety and Cooperative, good eye contact   Speech:  Repetitive   Mood:  anxious   Affect:  mood-congruent   Thought Process:  perserverative   Thought Content:  Somatic preoccupations   Perceptual Disturbances: Denies auditory or visual hallucinations   Risk Potential: Suicidal Ideations none, Homicidal Ideations none and Potential for Aggression No   Sensorium:  person, place and time/date   Cognition:  recent and remote memory grossly intact   Consciousness:  alert and awake    Attention: attention span appeared shorter than expected for age   Insight:  limited   Judgment: limited   Gait/Station: Uses walker   Motor Activity: Generalized lower extremity and upper extremity tremor noted     Progress Toward Goals:  minimal change    Assessment/Plan   Principal Problem:    Bipolar II disorder (HCC)  Active Problems:    Chronic bilateral low back pain with bilateral sciatica    Chronic pain syndrome    Essential hypertension    History of hypokalemia    Opioid dependence (HCC)    Panic disorder without agoraphobia    Post traumatic stress disorder    Tardive dyskinesia    Primary osteoarthritis of both knees    Multiple closed fractures of metatarsal bone of right foot    History of CVA (cerebrovascular accident)    Medical clearance for psychiatric admission    Mixed hyperlipidemia    Leukopenia      Recommended Treatment:  Currently on BuSpar 30 mg 3 times a day  Will decrease to 20 mg 3 times a day due to minimal benefit and concerns of adverse effect    On hydroxyzine 50 mg t i d     MS Contin 15 mg q 12 hours, avoid benzodiazepines to potential for interaction  Also on Lyrica 100 mg b i d  Continue paroxetine 40 mg daily    Seroquel 75 mg t i d , 100 mg at bedtime, will decrease to 50 mg 3 times a day and continue with 100 at bedtime due to increase tremors and concerns of adverse effects  Had hospitalization in June for dystonia    Ingrezza 80 mg daily    May consider lithium due to failure or adverse effects with multiple other medications     Continue with group therapy, milieu therapy and occupational therapy  Risks, benefits and possible side effects of Medications:   Risks, benefits, and possible side effects of medications explained to patient and patient verbalizes understanding  Counseling / Coordination of Care  Total floor / unit time spent today 30 minutes  Greater than 50% of total time was spent with the patient and / or family counseling and / or coordination of care   A description of the counseling / coordination of care:  Medication management, chart review, patient interview

## 2021-07-26 NOTE — PROGRESS NOTES
07/26/21 0859   Team Meeting   Meeting Type Daily Rounds   Team Members Present   Team Members Present Physician;Nurse;;   Physician Team Member [place]   Nursing Team Member [address] [facility] Management Team Member [place] [id number]   Social Work Team Member Freddy Espinal   Patient/Family Present   Patient Present No   Patient's Family Present No     Patient refused to complete her ADL's independently, she reports multiple medical concerns  Uses her wheelchair

## 2021-07-26 NOTE — TELEPHONE ENCOUNTER
Letter was written and signed stating that the patient requires her power be left on for medical reasons  It will be uploaded first thing tomorrow morning  Thank you

## 2021-07-26 NOTE — SOCIAL WORK
Attempted to meet with pt for 1:1 therapuetic monitoring and intervention  Pt was sitting by nursing station in wheelchair  Pt did not attend am group  Pt was shaking stating she was having an anxiety attack  Pt eye closed and somatic complaints  Pt stated she needed to see Dr Connell Backbone was aware that Dr Arredondo Fear was not in today  Encouraged pt to attend afternoon group if able  Pt was not able to engage in conversation and repetitive in comments  Will continue to monitor and encourage group participation

## 2021-07-26 NOTE — PLAN OF CARE
Problem: Alteration in Thoughts and Perception  Goal: Treatment Goal: Gain control of psychotic behaviors/thinking, reduce/eliminate presenting symptoms and demonstrate improved reality functioning upon discharge  Outcome: Progressing  Goal: Verbalize thoughts and feelings  Description: Interventions:  - Promote a nonjudgmental and trusting relationship with the patient through active listening and therapeutic communication  - Assess patient's level of functioning, behavior and potential for risk  - Engage patient in 1 on 1 interactions  - Encourage patient to express fears, feelings, frustrations, and discuss symptoms    - [place] patient to reality, help patient recognize reality-based thinking   - Administer medications as ordered and assess for potential side effects  - Provide the patient education related to the signs and symptoms of the illness and desired effects of prescribed medications  Outcome: Progressing  Goal: Refrain from acting on delusional thinking/internal stimuli  Description: Interventions:  - Monitor patient closely, per order   - Utilize least restrictive measures   - Set reasonable limits, give positive feedback for acceptable   - Administer medications as ordered and monitor of potential side effects  Outcome: Progressing  Goal: Agree to be compliant with medication regime, as prescribed and report medication side effects  Description: Interventions:  - Offer appropriate PRN medication and supervise ingestion; conduct AIMS, as needed   Outcome: Progressing  Goal: Attend and participate in unit activities, including therapeutic, recreational, and educational groups  Description: Interventions:  -Encourage Visitation and family involvement in care  Outcome: Progressing  Goal: Recognize dysfunctional thoughts, communicate reality-based thoughts at the time of discharge  Description: Interventions:  - Provide medication and psycho-education to assist patient in compliance and developing insight into his/her illness   Outcome: Progressing  Goal: Complete daily ADLs, including personal hygiene independently, as able  Description: Interventions:  - Observe, teach, and assist patient with ADLS  - Monitor and promote a balance of rest/activity, with adequate nutrition and elimination   Outcome: Progressing     Problem: Ineffective Coping  Goal: Identifies ineffective coping skills  Outcome: Progressing  Goal: Identifies healthy coping skills  Outcome: Progressing  Goal: Demonstrates healthy coping skills  Outcome: Progressing  Goal: Patient/Family participate in treatment and DC plans  Description: Interventions:  - Provide therapeutic environment  Outcome: Progressing  Goal: Patient/Family verbalizes awareness of resources  Outcome: Progressing     Problem: Anxiety  Goal: Anxiety is at manageable level  Description: Interventions:  - Assess and monitor patient's anxiety level  - Monitor for signs and symptoms (heart palpitations, chest pain, shortness of breath, headaches, nausea, feeling jumpy, restlessness, irritable, apprehensive)  - Collaborate with interdisciplinary team and initiate plan and interventions as ordered  - [place] patient to unit/surroundings  - Explain treatment plan  - Encourage participation in care  - Encourage verbalization of concerns/fears  - Identify coping mechanisms  - Assist in developing anxiety-reducing skills  - Administer/offer alternative therapies  - Limit or eliminate stimulants  Outcome: Progressing     Problem: BEHAVIOR  Goal: Pt/Family maintain appropriate behavior and adhere to behavioral management agreement, if implemented  Description: INTERVENTIONS:  - Assess the family dynamic   - Encourage verbalization of thoughts and concerns in a socially appropriate manner  - Assess patient/family's coping skills and non-compliant behavior (including use of illegal substances)    - Utilize positive, consistent limit setting strategies supporting safety of patient, staff and others  - Initiate consult with Case Management, Spiritual Care or other ancillary services as appropriate  - If a patient's/visitor's behavior jeopardizes the safety of the patient, staff, or others, refer to organization procedure     - Notify Security of behavior or suspected illegal substances which indicate the need for search of the patient and/or belongings  - Encourage participation in the decision making process about a behavioral management agreement; implement if patient meets criteria  Outcome: Progressing     Problem: ANXIETY  Goal: Will report anxiety at manageable levels  Description: INTERVENTIONS:  - Administer medication as ordered  - Teach and encourage coping skills  - Provide emotional support  - Assess patient/family for anxiety and ability to cope  Outcome: Progressing  Goal: By discharge: Patient will verbalize 2 strategies to deal with anxiety  Description: Interventions:  - Identify any obvious source/trigger to anxiety  - Staff will assist patient in applying identified coping technique/skills  - Encourage attendance of scheduled groups and activities  Outcome: Progressing     Problem: PAIN - ADULT  Goal: Verbalizes/displays adequate comfort level or baseline comfort level  Description: Interventions:  - Encourage patient to monitor pain and request assistance  - Assess pain using appropriate pain scale  - Administer analgesics based on type and severity of pain and evaluate response  - Implement non-pharmacological measures as appropriate and evaluate response  - Consider cultural and social influences on pain and pain management  - Notify physician/advanced practitioner if interventions unsuccessful or patient reports new pain  Outcome: Progressing     Problem: Ineffective Coping  Goal: Participates in unit activities  Description: Interventions:  - Provide therapeutic environment   - Provide required programming   - Redirect inappropriate behaviors   Outcome: Progressing

## 2021-07-27 PROCEDURE — 99232 SBSQ HOSP IP/OBS MODERATE 35: CPT | Performed by: REGISTERED NURSE

## 2021-07-27 RX ORDER — OLANZAPINE 2.5 MG/1
2.5 TABLET ORAL
Status: DISCONTINUED | OUTPATIENT
Start: 2021-07-27 | End: 2021-07-27

## 2021-07-27 RX ADMIN — ASPIRIN 81 MG CHEWABLE TABLET 81 MG: 81 TABLET CHEWABLE at 08:11

## 2021-07-27 RX ADMIN — ACETAMINOPHEN 975 MG: 325 TABLET ORAL at 21:11

## 2021-07-27 RX ADMIN — BUSPIRONE HYDROCHLORIDE 20 MG: 10 TABLET ORAL at 21:11

## 2021-07-27 RX ADMIN — ACETAMINOPHEN 975 MG: 325 TABLET ORAL at 06:17

## 2021-07-27 RX ADMIN — PREGABALIN 100 MG: 50 CAPSULE ORAL at 18:19

## 2021-07-27 RX ADMIN — PREGABALIN 100 MG: 50 CAPSULE ORAL at 08:10

## 2021-07-27 RX ADMIN — PANTOPRAZOLE SODIUM 20 MG: 20 TABLET, DELAYED RELEASE ORAL at 06:18

## 2021-07-27 RX ADMIN — DICLOFENAC SODIUM 2 G: 10 GEL TOPICAL at 18:19

## 2021-07-27 RX ADMIN — POTASSIUM CHLORIDE 20 MEQ: 20 TABLET, EXTENDED RELEASE ORAL at 08:09

## 2021-07-27 RX ADMIN — QUETIAPINE FUMARATE 100 MG: 100 TABLET ORAL at 21:15

## 2021-07-27 RX ADMIN — PAROXETINE HYDROCHLORIDE 40 MG: 20 TABLET, FILM COATED ORAL at 08:10

## 2021-07-27 RX ADMIN — MORPHINE SULFATE 15 MG: 15 TABLET, EXTENDED RELEASE ORAL at 21:13

## 2021-07-27 RX ADMIN — OLANZAPINE 2.5 MG: 2.5 TABLET, FILM COATED ORAL at 11:14

## 2021-07-27 RX ADMIN — BUSPIRONE HYDROCHLORIDE 20 MG: 10 TABLET ORAL at 08:10

## 2021-07-27 RX ADMIN — HYDROXYZINE HYDROCHLORIDE 50 MG: 50 TABLET, FILM COATED ORAL at 21:12

## 2021-07-27 RX ADMIN — DICLOFENAC SODIUM 2 G: 10 GEL TOPICAL at 21:17

## 2021-07-27 RX ADMIN — ACETAMINOPHEN 975 MG: 325 TABLET ORAL at 14:56

## 2021-07-27 RX ADMIN — AMLODIPINE BESYLATE 5 MG: 5 TABLET ORAL at 08:11

## 2021-07-27 RX ADMIN — MORPHINE SULFATE 15 MG: 15 TABLET, EXTENDED RELEASE ORAL at 08:10

## 2021-07-27 RX ADMIN — BUSPIRONE HYDROCHLORIDE 20 MG: 10 TABLET ORAL at 15:51

## 2021-07-27 RX ADMIN — HYDROXYZINE HYDROCHLORIDE 50 MG: 50 TABLET, FILM COATED ORAL at 08:11

## 2021-07-27 RX ADMIN — HYDROXYZINE HYDROCHLORIDE 50 MG: 50 TABLET, FILM COATED ORAL at 15:51

## 2021-07-27 RX ADMIN — QUETIAPINE 50 MG: 50 TABLET ORAL at 15:51

## 2021-07-27 RX ADMIN — DICLOFENAC SODIUM 2 G: 10 GEL TOPICAL at 12:20

## 2021-07-27 RX ADMIN — OLANZAPINE 5 MG: 5 TABLET, FILM COATED ORAL at 20:04

## 2021-07-27 RX ADMIN — FOLIC ACID 1000 MCG: 1 TABLET ORAL at 08:10

## 2021-07-27 RX ADMIN — QUETIAPINE 50 MG: 50 TABLET ORAL at 21:15

## 2021-07-27 RX ADMIN — VALBENAZINE 80 MG: 80 CAPSULE ORAL at 08:19

## 2021-07-27 RX ADMIN — ATORVASTATIN CALCIUM 40 MG: 40 TABLET, FILM COATED ORAL at 15:51

## 2021-07-27 RX ADMIN — PHENYTOIN 2 MG: 125 SUSPENSION ORAL at 08:11

## 2021-07-27 RX ADMIN — QUETIAPINE 50 MG: 50 TABLET ORAL at 08:11

## 2021-07-27 NOTE — NURSING NOTE
Pt visible in Milieu; walking back and forth between Dayroom and bedroom independently with walker  Pt c/o 3/4 anxiety, 9/10 anxiety, 3/4 agitation r/t current hospitalization  Denies all other s/s  Pt received 2 5 Mg Zyprexa at 11:14 for moderate agitation  Effective  Pt currently visible in dayroom socializing with select peers  Will continue to encourage Pt to participate in unit activities  Pt previously noted to request other Pt's bring her water from her room to the dayroom for her  Pt counseled about appropriateness and asking staff for help instead  Pt approaching writer at 1400, asking to take PRN zyprexa 2 5 with her scheduled buspar  Pt educated by another RN on unit that PRN medications aren't something that is scheduled in advanced, given when needed  Pt then approaching station later and asking for the zyprexa 2 5, informed that PRN dose has been d/c  Pt becoming agitated, screaming and cursing at staff  Pt noted to be yelling at staff saying, "Fuck you, you Bitch  I'm being played with "  Pt in dayroom yelling in front of other peers, blaming staff  Pt asked to come out of dayroom to talk with staff and redirect, pt refusing  Pt slamming walker on ground  Pt was counseled on appropriate use of PRN medications and encouraged to attempt the use of Coping skills  Security called for a walk through  Pt verbally redirected, continues to blame staff, stating she is being mistreated  Pt stating "it is a race thing "  Pt attempted to be counseled by staff on unit, pt continuing to blame staff and tell peers on unit that staff is mistreating her

## 2021-07-27 NOTE — NURSING NOTE
Patient telling peer on unit to "get out of my chair", noted to say 2x to same peer on unit  Pt approaching nurse's station and apologized to writer for previous behavior

## 2021-07-27 NOTE — NURSING NOTE
Patient visible on the unit socializing with peers and staff members  Patient reporting 8/10 depression and 4/4 anxiety, denies SI/HI/AH/VH  Patient reports concern about medication changes, states thinking about anxiety causes anxiety  Patient reassured  Patient educated on dysphagia diet, noted to be taking food from others  Medication and meal compliant, appetite fair  VSS, no signs of distress noted at this time  Will continue to monitor frequently

## 2021-07-27 NOTE — PLAN OF CARE
Problem: Alteration in Thoughts and Perception  Goal: Treatment Goal: Gain control of psychotic behaviors/thinking, reduce/eliminate presenting symptoms and demonstrate improved reality functioning upon discharge  Outcome: Progressing  Goal: Verbalize thoughts and feelings  Description: Interventions:  - Promote a nonjudgmental and trusting relationship with the patient through active listening and therapeutic communication  - Assess patient's level of functioning, behavior and potential for risk  - Engage patient in 1 on 1 interactions  - Encourage patient to express fears, feelings, frustrations, and discuss symptoms    - [place] patient to reality, help patient recognize reality-based thinking   - Administer medications as ordered and assess for potential side effects  - Provide the patient education related to the signs and symptoms of the illness and desired effects of prescribed medications  Outcome: Progressing  Goal: Refrain from acting on delusional thinking/internal stimuli  Description: Interventions:  - Monitor patient closely, per order   - Utilize least restrictive measures   - Set reasonable limits, give positive feedback for acceptable   - Administer medications as ordered and monitor of potential side effects  Outcome: Progressing  Goal: Agree to be compliant with medication regime, as prescribed and report medication side effects  Description: Interventions:  - Offer appropriate PRN medication and supervise ingestion; conduct AIMS, as needed   Outcome: Progressing  Goal: Attend and participate in unit activities, including therapeutic, recreational, and educational groups  Description: Interventions:  -Encourage Visitation and family involvement in care  Outcome: Progressing  Goal: Recognize dysfunctional thoughts, communicate reality-based thoughts at the time of discharge  Description: Interventions:  - Provide medication and psycho-education to assist patient in compliance and developing insight into his/her illness   Outcome: Progressing  Goal: Complete daily ADLs, including personal hygiene independently, as able  Description: Interventions:  - Observe, teach, and assist patient with ADLS  - Monitor and promote a balance of rest/activity, with adequate nutrition and elimination   Outcome: Progressing     Problem: Ineffective Coping  Goal: Identifies ineffective coping skills  Outcome: Progressing  Goal: Identifies healthy coping skills  Outcome: Progressing  Goal: Demonstrates healthy coping skills  Outcome: Progressing  Goal: Patient/Family participate in treatment and DC plans  Description: Interventions:  - Provide therapeutic environment  Outcome: Progressing  Goal: Patient/Family verbalizes awareness of resources  Outcome: Progressing     Problem: Anxiety  Goal: Anxiety is at manageable level  Description: Interventions:  - Assess and monitor patient's anxiety level  - Monitor for signs and symptoms (heart palpitations, chest pain, shortness of breath, headaches, nausea, feeling jumpy, restlessness, irritable, apprehensive)  - Collaborate with interdisciplinary team and initiate plan and interventions as ordered  - [place] patient to unit/surroundings  - Explain treatment plan  - Encourage participation in care  - Encourage verbalization of concerns/fears  - Identify coping mechanisms  - Assist in developing anxiety-reducing skills  - Administer/offer alternative therapies  - Limit or eliminate stimulants  Outcome: Progressing     Problem: BEHAVIOR  Goal: Pt/Family maintain appropriate behavior and adhere to behavioral management agreement, if implemented  Description: INTERVENTIONS:  - Assess the family dynamic   - Encourage verbalization of thoughts and concerns in a socially appropriate manner  - Assess patient/family's coping skills and non-compliant behavior (including use of illegal substances)    - Utilize positive, consistent limit setting strategies supporting safety of patient, staff and others  - Initiate consult with Case Management, Spiritual Care or other ancillary services as appropriate  - If a patient's/visitor's behavior jeopardizes the safety of the patient, staff, or others, refer to organization procedure     - Notify Security of behavior or suspected illegal substances which indicate the need for search of the patient and/or belongings  - Encourage participation in the decision making process about a behavioral management agreement; implement if patient meets criteria  Outcome: Progressing     Problem: ANXIETY  Goal: Will report anxiety at manageable levels  Description: INTERVENTIONS:  - Administer medication as ordered  - Teach and encourage coping skills  - Provide emotional support  - Assess patient/family for anxiety and ability to cope  Outcome: Progressing  Goal: By discharge: Patient will verbalize 2 strategies to deal with anxiety  Description: Interventions:  - Identify any obvious source/trigger to anxiety  - Staff will assist patient in applying identified coping technique/skills  - Encourage attendance of scheduled groups and activities  Outcome: Progressing     Problem: PAIN - ADULT  Goal: Verbalizes/displays adequate comfort level or baseline comfort level  Description: Interventions:  - Encourage patient to monitor pain and request assistance  - Assess pain using appropriate pain scale  - Administer analgesics based on type and severity of pain and evaluate response  - Implement non-pharmacological measures as appropriate and evaluate response  - Consider cultural and social influences on pain and pain management  - Notify physician/advanced practitioner if interventions unsuccessful or patient reports new pain  Outcome: Progressing     Problem: Ineffective Coping  Goal: Participates in unit activities  Description: Interventions:  - Provide therapeutic environment   - Provide required programming   - Redirect inappropriate behaviors   Outcome: Progressing

## 2021-07-27 NOTE — PROGRESS NOTES
07/27/21 0841   Team Meeting   Meeting Type Daily Rounds   Initial Conference Date 07/27/21   Next Conference Date 07/28/21   Team Members Present   Team Members Present Physician;Nurse;;   Patient/Family Present   Patient Present No   Patient's Family Present No   Dr Eduardo Wolf, Sidonie Pages, T Norina Schilder- 8/10, 4/4, -SI, viosible and somaticv, taking food from peers because she doesn't like her diet restrictions, does better in afternoons, slept,

## 2021-07-27 NOTE — PROGRESS NOTES
Pt attended ALL groups and  education: morning routine group  Pt able to stay for duration  Pt anxious about seeing the doctor  Pt somatic at times  Pt was not able to see her progress when reviewed that she was now particpating in am group  Pt making slow improvements in mood and behaviors  Lacks confidence and self esteem and focused on medical needs  Improvement in afternoon noted with brighter affect  Pt addressed how she survived having a stroke  07/27/21 1000   Activity/Group Checklist   Group Other (Comment)  (Mh education: morning routine)   Attendance Attended   Attendance Duration (min) 31-45   Interactions Other (Comment)  (anxious)   Affect/Mood Other (Comment)  (restless)   Goals Achieved Identified feelings; Discussed coping strategies; Able to listen to others; Able to engage in interactions; Able to reflect/comment on own behavior;Able to self-disclose; Able to recieve feedback

## 2021-07-27 NOTE — PROGRESS NOTES
Progress Note - Behavioral Health   Samantha Chapman 60 y o  female MRN: [id number]  Unit/Bed#: [facility] 809-24 Encounter: [id number]    Assessment/Plan   Principal Problem:    Bipolar II disorder ([address] )  Active Problems:    Chronic bilateral low back pain with bilateral sciatica    Chronic pain syndrome    Essential hypertension    History of hypokalemia    Opioid dependence (HCC)    Panic disorder without agoraphobia    Post traumatic stress disorder    Tardive dyskinesia    Primary osteoarthritis of both knees    Multiple closed fractures of metatarsal bone of right foot    History of CVA (cerebrovascular accident)    Medical clearance for psychiatric admission    Mixed hyperlipidemia    Leukopenia      Subjective :Patient was seen today for continuation of care, records reviewed and  patient was discussed with the morning treatment team  Nursing staff report Samantha has somatic complaints and was noted trying to take food from peers  She is currently on a dysphagia diet, dental soft with thin liquids  Nursing staff reached to provider and requested a prn for moderate agitation  Zyprexa 2 5 mg po prn every 4 hours for moderate agitation was ordered  Samantha was noted to be in groups today  She continues to report  panic attacks and stated "they are ruining my life"  Samantha reported she had a panic attack this morning with symptoms of SOB, pounding heart and feeling shaky"  She denies suicidal or homicidal ideation  She did appear anxious during visit with provider  She reports anxiety 4/4 and depression 8/10  We did discuss the use of breathing techniques and she stated that she does attempt to use them  Samantha reports that her appetite is fair and it was noted that she ate 50 percent of breakfast and 100 percent of lunch  Samantha reported that she had difficulty falling asleep last night, she does have a prn Remeron ordered    She declined She is taking her medications as prescribed and denies any medication side effects  She continues to be somatically preoccupied  After further review of chart it was noted that patient had dystonia last month  The Zyprexa 2 5 mg po prn for moderate agitation for discontinued with this information  Psychiatric Review of Systems:    Sleep: difficulty falling asleep  Appetite: fair  Medication side effects: No   ROS: all other systems are negative, urinary frequency    Vitals:  Vitals:    07/27/21 1217   BP: 102/58   Pulse: 69   Resp:    Temp:    SpO2:        Mental Status Evaluation:    Appearance:  age appropriate, casually dressed, dressed appropriately, adequate grooming   Behavior:  cooperative, good eye contact   Speech:  coherent   Mood:  anxious   Affect:  mood-congruent   Thought Process:  perseverative   Associations: perseveration   Thought Content:  no overt delusions   Perceptual Disturbances: none   Risk Potential: Suicidal ideation - None  Homicidal ideation - None  Potential for aggression - No   Sensorium:  oriented to person, place and time/date   Memory:  recent and remote memory grossly intact   Consciousness:  alert and awake   Attention: attention span and concentration appear shorter than expected for age   Insight:  limited   Judgment: limited   Gait/Station: also uses walker   Motor Activity: some slight tremors of upper extremities noted      Laboratory results:    I have personally reviewed all pertinent laboratory/tests results    Most Recent Labs:   Lab Results   Component Value Date    WBC 5 60 07/23/2021    RBC 4 03 07/23/2021    HGB 12 2 07/23/2021    HCT 36 3 07/23/2021     07/23/2021    RDW 14 8 07/23/2021    NEUTROABS 3 40 07/23/2021    SODIUM 140 07/17/2021    K 4 0 07/17/2021     07/17/2021    CO2 25 07/17/2021    BUN 11 07/17/2021    CREATININE 0 63 07/17/2021    GLUC 96 07/17/2021    GLUF 96 07/17/2021    CALCIUM 8 9 07/17/2021    AST 12 07/11/2021    ALT 18 07/11/2021    ALKPHOS 104 07/11/2021    TP 7 8 07/11/2021    ALB 3 7 07/11/2021 TBILI 0 67 07/11/2021    CHOLESTEROL 215 (H) 02/13/2020    HDL 84 02/13/2020    TRIG 74 02/13/2020    LDLCALC 116 (H) 02/13/2020    NONHDLC 131 02/13/2020    AMMONIA <10 (L) 06/27/2021    [id number] 2 060 06/30/2021    FREET4 0 80 02/13/2020    PREGSERUM Negative 02/22/2014    RPR Non-Reactive 06/29/2021    HGBA1C 4 8 02/08/2021    EAG 91 02/08/2021       Progress Toward Goals:     No significant changes noted    Recommended Treatment:   Continue Buspar 20 mg po TID  Continue Atarax 50 mg po TID  Continue Paxil 40 mg po daily  Continue Prazosin 2 mg po daily  Continue Seroquel 100 mg po HS and 50 mg po TID  Continue Ingrezza 80 mg po daily     All current active medications have been reviewed  Encourage group therapy, milieu therapy and occupational therapy  Behavioral Health checks every 7 minutes  Continue current medications:  Current Facility-Administered Medications   Medication Dose Route Frequency Provider Last Rate    acetaminophen  975 mg Oral Q8H [address] MERCY Nieto      aluminum-magnesium hydroxide-simethicone  30 mL Oral Q4H PRN Kittie Peabody, CRNP      amLODIPine  5 mg Oral Daily Kittie Peabody, CRNP      aspirin  81 mg Oral Daily Kittie Peabody, CRNP      atorvastatin  40 mg Oral Daily With MERCY Colvin      benztropine  1 mg Intramuscular Q4H PRN Max 6/day Kittie Peabody, CRNP      benztropine  0 5 mg Oral Q4H PRN Max 6/day Kittie Peabody, CRNP      busPIRone  20 mg Oral TID Javy Limon PA-C      Diclofenac Sodium  2 g Topical 4x Daily Kittie Peabody, CRNP      enoxaparin  40 mg Subcutaneous Daily Kittie Peabody, CRNP      folic acid  9,479 mcg Oral Daily Kittie Peabody, CRNP      hydrOXYzine HCL  50 mg Oral TID Rafael Elaine MD      LORazepam  1 mg Intramuscular Q6H PRN Max 3/day Kittie Peabody, CRNP      mirtazapine  7 5 mg Oral HS PRN Kittie Peabody, CRNP      morphine  15 mg Oral Q12H [address] Cheri Peabody, CRNP      nicotine polacrilex  2 mg Oral Q2H PRN Kittie Peabody, CRNP      OLANZapine  5 mg Intramuscular Q3H PRN Max 3/day Orysia Rochelle, CRERIBERTO      OLANZapine  2 5 mg Oral Q3H PRN Monie Louis, MERCY      OLANZapine  5 mg Oral Q3H PRN Max 3/day Orysia Rochelle, MERCY      ondansetron  4 mg Oral Q6H PRN Orysia Rochelle, MERCY      pantoprazole  20 mg Oral Early Morning Orysia Rochelle, MERCY      PARoxetine  40 mg Oral Daily Nia Tello MD      polyethylene glycol  17 g Oral Daily PRN Nhi Del Cid MD      potassium chloride  20 mEq Oral Daily Orysia Rochelle, MERCY      prazosin  2 mg Oral Daily Orysia Rochelle, MERCY      pregabalin  100 mg Oral BID Orysia Rochelle, MERCY      propranolol  10 mg Oral BID before breakfast/lunch Nia Tello MD      QUEtiapine  100 mg Oral HS Oragustinia Rochelle, MERCY      QUEtiapine  50 mg Oral TID Brad Alexander PA-C      senna-docusate sodium  1 tablet Oral Daily PRN Ornahun Byrd, MERCY      Valbenazine Tosylate  80 mg Oral Daily OrMERCY Ernandez         Risks / Benefits of Treatment:     Risks, benefits, and possible side effects of medications explained to patient and patient verbalizes understanding and agreement for treatment  Counseling / Coordination of Care:     Patient's progress reviewed with nursing staff  Medications, treatment progress and treatment plan reviewed with patient  Supportive counseling provided to the patient          MERCY Swan

## 2021-07-27 NOTE — NURSING NOTE
Patient coming up to nurse's station and asking for scheduled Voltaren gel to be given to her in a cup  Pt informed that medication was scheduled and not due at this time  Pt informed medication would be administered soon  Pt throwing up hands and yelling at writer for withholding medications  Pt continuing to yell at [address] and tell other patients that staff is mean to her "because it is a race thing "  Pt attempted to be counseled by multiple staff

## 2021-07-27 NOTE — PLAN OF CARE
Problem: Alteration in Thoughts and Perception  Goal: Verbalize thoughts and feelings  Description: Interventions:  - Promote a nonjudgmental and trusting relationship with the patient through active listening and therapeutic communication  - Assess patient's level of functioning, behavior and potential for risk  - Engage patient in 1 on 1 interactions  - Encourage patient to express fears, feelings, frustrations, and discuss symptoms    - [place] patient to reality, help patient recognize reality-based thinking   - Administer medications as ordered and assess for potential side effects  - Provide the patient education related to the signs and symptoms of the illness and desired effects of prescribed medications  Outcome: Progressing  Goal: Attend and participate in unit activities, including therapeutic, recreational, and educational groups  Description: Interventions:  -Encourage Visitation and family involvement in care  Outcome: Progressing     Problem: Ineffective Coping  Goal: Identifies ineffective coping skills  Outcome: Progressing  Goal: Identifies healthy coping skills  Outcome: Progressing  Goal: Demonstrates healthy coping skills  Outcome: Progressing     Problem: Anxiety  Goal: Anxiety is at manageable level  Description: Interventions:  - Assess and monitor patient's anxiety level  - Monitor for signs and symptoms (heart palpitations, chest pain, shortness of breath, headaches, nausea, feeling jumpy, restlessness, irritable, apprehensive)  - Collaborate with interdisciplinary team and initiate plan and interventions as ordered    - [place] patient to unit/surroundings  - Explain treatment plan  - Encourage participation in care  - Encourage verbalization of concerns/fears  - Identify coping mechanisms  - Assist in developing anxiety-reducing skills  - Administer/offer alternative therapies  - Limit or eliminate stimulants  Outcome: Progressing

## 2021-07-28 ENCOUNTER — TELEPHONE (OUTPATIENT)
Dept: INTERNAL MEDICINE CLINIC | Facility: CLINIC | Age: 56
End: 2021-07-28

## 2021-07-28 PROCEDURE — 99232 SBSQ HOSP IP/OBS MODERATE 35: CPT | Performed by: NURSE PRACTITIONER

## 2021-07-28 RX ORDER — QUETIAPINE FUMARATE 50 MG/1
50 TABLET, FILM COATED ORAL 4 TIMES DAILY
Status: DISCONTINUED | OUTPATIENT
Start: 2021-07-28 | End: 2021-07-30

## 2021-07-28 RX ORDER — QUETIAPINE FUMARATE 50 MG/1
50 TABLET, FILM COATED ORAL
Status: DISCONTINUED | OUTPATIENT
Start: 2021-07-28 | End: 2021-08-02

## 2021-07-28 RX ADMIN — PROPRANOLOL HYDROCHLORIDE 10 MG: 10 TABLET ORAL at 12:19

## 2021-07-28 RX ADMIN — PANTOPRAZOLE SODIUM 20 MG: 20 TABLET, DELAYED RELEASE ORAL at 06:02

## 2021-07-28 RX ADMIN — ATORVASTATIN CALCIUM 40 MG: 40 TABLET, FILM COATED ORAL at 16:25

## 2021-07-28 RX ADMIN — ACETAMINOPHEN 975 MG: 325 TABLET ORAL at 13:03

## 2021-07-28 RX ADMIN — BUSPIRONE HYDROCHLORIDE 20 MG: 10 TABLET ORAL at 16:24

## 2021-07-28 RX ADMIN — QUETIAPINE 50 MG: 50 TABLET ORAL at 08:29

## 2021-07-28 RX ADMIN — PREGABALIN 100 MG: 50 CAPSULE ORAL at 08:29

## 2021-07-28 RX ADMIN — HYDROXYZINE HYDROCHLORIDE 50 MG: 50 TABLET, FILM COATED ORAL at 16:25

## 2021-07-28 RX ADMIN — DICLOFENAC SODIUM 2 G: 10 GEL TOPICAL at 12:19

## 2021-07-28 RX ADMIN — AMLODIPINE BESYLATE 5 MG: 5 TABLET ORAL at 08:29

## 2021-07-28 RX ADMIN — ACETAMINOPHEN 975 MG: 325 TABLET ORAL at 21:02

## 2021-07-28 RX ADMIN — QUETIAPINE FUMARATE 50 MG: 50 TABLET ORAL at 17:02

## 2021-07-28 RX ADMIN — OLANZAPINE 5 MG: 5 TABLET, FILM COATED ORAL at 06:02

## 2021-07-28 RX ADMIN — PHENYTOIN 2 MG: 125 SUSPENSION ORAL at 08:29

## 2021-07-28 RX ADMIN — HYDROXYZINE HYDROCHLORIDE 50 MG: 50 TABLET, FILM COATED ORAL at 21:01

## 2021-07-28 RX ADMIN — DICLOFENAC SODIUM 2 G: 10 GEL TOPICAL at 17:03

## 2021-07-28 RX ADMIN — QUETIAPINE FUMARATE 50 MG: 50 TABLET ORAL at 21:05

## 2021-07-28 RX ADMIN — BUSPIRONE HYDROCHLORIDE 20 MG: 10 TABLET ORAL at 08:29

## 2021-07-28 RX ADMIN — ASPIRIN 81 MG CHEWABLE TABLET 81 MG: 81 TABLET CHEWABLE at 08:29

## 2021-07-28 RX ADMIN — VALBENAZINE 80 MG: 80 CAPSULE ORAL at 08:35

## 2021-07-28 RX ADMIN — MORPHINE SULFATE 15 MG: 15 TABLET, EXTENDED RELEASE ORAL at 21:02

## 2021-07-28 RX ADMIN — FOLIC ACID 1000 MCG: 1 TABLET ORAL at 08:30

## 2021-07-28 RX ADMIN — PROPRANOLOL HYDROCHLORIDE 10 MG: 10 TABLET ORAL at 06:02

## 2021-07-28 RX ADMIN — ACETAMINOPHEN 975 MG: 325 TABLET ORAL at 06:02

## 2021-07-28 RX ADMIN — MIRTAZAPINE 7.5 MG: 7.5 TABLET, FILM COATED ORAL at 22:02

## 2021-07-28 RX ADMIN — BUSPIRONE HYDROCHLORIDE 20 MG: 10 TABLET ORAL at 21:01

## 2021-07-28 RX ADMIN — PREGABALIN 100 MG: 50 CAPSULE ORAL at 17:02

## 2021-07-28 RX ADMIN — PAROXETINE HYDROCHLORIDE 40 MG: 20 TABLET, FILM COATED ORAL at 08:29

## 2021-07-28 RX ADMIN — MORPHINE SULFATE 15 MG: 15 TABLET, EXTENDED RELEASE ORAL at 08:29

## 2021-07-28 RX ADMIN — POTASSIUM CHLORIDE 20 MEQ: 20 TABLET, EXTENDED RELEASE ORAL at 08:29

## 2021-07-28 RX ADMIN — HYDROXYZINE HYDROCHLORIDE 50 MG: 50 TABLET, FILM COATED ORAL at 08:30

## 2021-07-28 RX ADMIN — DICLOFENAC SODIUM 2 G: 10 GEL TOPICAL at 21:05

## 2021-07-28 RX ADMIN — QUETIAPINE FUMARATE 50 MG: 50 TABLET ORAL at 21:01

## 2021-07-28 NOTE — PLAN OF CARE
Problem: Alteration in Thoughts and Perception  Goal: Treatment Goal: Gain control of psychotic behaviors/thinking, reduce/eliminate presenting symptoms and demonstrate improved reality functioning upon discharge  Outcome: Progressing  Goal: Verbalize thoughts and feelings  Description: Interventions:  - Promote a nonjudgmental and trusting relationship with the patient through active listening and therapeutic communication  - Assess patient's level of functioning, behavior and potential for risk  - Engage patient in 1 on 1 interactions  - Encourage patient to express fears, feelings, frustrations, and discuss symptoms    - [place] patient to reality, help patient recognize reality-based thinking   - Administer medications as ordered and assess for potential side effects  - Provide the patient education related to the signs and symptoms of the illness and desired effects of prescribed medications  Outcome: Progressing  Goal: Refrain from acting on delusional thinking/internal stimuli  Description: Interventions:  - Monitor patient closely, per order   - Utilize least restrictive measures   - Set reasonable limits, give positive feedback for acceptable   - Administer medications as ordered and monitor of potential side effects  Outcome: Progressing  Goal: Agree to be compliant with medication regime, as prescribed and report medication side effects  Description: Interventions:  - Offer appropriate PRN medication and supervise ingestion; conduct AIMS, as needed   Outcome: Progressing  Goal: Attend and participate in unit activities, including therapeutic, recreational, and educational groups  Description: Interventions:  -Encourage Visitation and family involvement in care  Outcome: Progressing  Goal: Recognize dysfunctional thoughts, communicate reality-based thoughts at the time of discharge  Description: Interventions:  - Provide medication and psycho-education to assist patient in compliance and developing insight into his/her illness   Outcome: Progressing  Goal: Complete daily ADLs, including personal hygiene independently, as able  Description: Interventions:  - Observe, teach, and assist patient with ADLS  - Monitor and promote a balance of rest/activity, with adequate nutrition and elimination   Outcome: Progressing     Problem: Ineffective Coping  Goal: Identifies ineffective coping skills  Outcome: Progressing  Goal: Identifies healthy coping skills  Outcome: Progressing  Goal: Demonstrates healthy coping skills  Outcome: Progressing  Goal: Patient/Family participate in treatment and DC plans  Description: Interventions:  - Provide therapeutic environment  Outcome: Progressing  Goal: Patient/Family verbalizes awareness of resources  Outcome: Progressing     Problem: Anxiety  Goal: Anxiety is at manageable level  Description: Interventions:  - Assess and monitor patient's anxiety level  - Monitor for signs and symptoms (heart palpitations, chest pain, shortness of breath, headaches, nausea, feeling jumpy, restlessness, irritable, apprehensive)  - Collaborate with interdisciplinary team and initiate plan and interventions as ordered  - [place] patient to unit/surroundings  - Explain treatment plan  - Encourage participation in care  - Encourage verbalization of concerns/fears  - Identify coping mechanisms  - Assist in developing anxiety-reducing skills  - Administer/offer alternative therapies  - Limit or eliminate stimulants  Outcome: Progressing     Problem: BEHAVIOR  Goal: Pt/Family maintain appropriate behavior and adhere to behavioral management agreement, if implemented  Description: INTERVENTIONS:  - Assess the family dynamic   - Encourage verbalization of thoughts and concerns in a socially appropriate manner  - Assess patient/family's coping skills and non-compliant behavior (including use of illegal substances)    - Utilize positive, consistent limit setting strategies supporting safety of patient, staff and others  - Initiate consult with Case Management, Spiritual Care or other ancillary services as appropriate  - If a patient's/visitor's behavior jeopardizes the safety of the patient, staff, or others, refer to organization procedure     - Notify Security of behavior or suspected illegal substances which indicate the need for search of the patient and/or belongings  - Encourage participation in the decision making process about a behavioral management agreement; implement if patient meets criteria  Outcome: Progressing     Problem: ANXIETY  Goal: Will report anxiety at manageable levels  Description: INTERVENTIONS:  - Administer medication as ordered  - Teach and encourage coping skills  - Provide emotional support  - Assess patient/family for anxiety and ability to cope  Outcome: Progressing  Goal: By discharge: Patient will verbalize 2 strategies to deal with anxiety  Description: Interventions:  - Identify any obvious source/trigger to anxiety  - Staff will assist patient in applying identified coping technique/skills  - Encourage attendance of scheduled groups and activities  Outcome: Progressing     Problem: PAIN - ADULT  Goal: Verbalizes/displays adequate comfort level or baseline comfort level  Description: Interventions:  - Encourage patient to monitor pain and request assistance  - Assess pain using appropriate pain scale  - Administer analgesics based on type and severity of pain and evaluate response  - Implement non-pharmacological measures as appropriate and evaluate response  - Consider cultural and social influences on pain and pain management  - Notify physician/advanced practitioner if interventions unsuccessful or patient reports new pain  Outcome: Progressing     Problem: Ineffective Coping  Goal: Participates in unit activities  Description: Interventions:  - Provide therapeutic environment   - Provide required programming   - Redirect inappropriate behaviors   Outcome: Progressing

## 2021-07-28 NOTE — NURSING NOTE
Pt visible in dayroom throughout morning, socializing with select peers  Pt stated that she felt "well rested"  Pt reports Depression 7/10, anxiety 4/4, denies all other Psych s/s  Pt educated on use of coping mechanisms such as progressive relaxation to relieve anxiety  Pt verbalized understanding stating she attempt techniques  Pt Reports 0/10 pain  No signs of distress noted  Will continue to encourage Pt to participate in group activities  Pt approached nurses station reporting severe agitation asking for PRN Zyprexa  Pt was informed providers are reviewing her medical record and that she is unable to receive Zyprexa at this time  Pt instructed to utilize coping mechanisms she practiced earlier in the morning to relieve agitation; Pt agreed to use techniques  Pt is currently resting comfortably in Dayroom watching television with Peers  Will continue to monitor frequently  Pt noted to be asking other Peers to check the time for her

## 2021-07-28 NOTE — CASE MANAGEMENT
Approached pt as she was leaving her room, ambulating w/o device carrying a bag  I escorted her to her wc , which she sat in, but it was not locked and moved as she sat  We then talked about her safety and how she must either use her RW or the wc for her own safety  CM also reviewed the safey of always putting her wc brakes on prior to stading and or sitting  Pt reports she is not ready to leave this unit as she is still "having severe anxiety attacks and the medicine is not helping me  ":She then reported she spoke to the nurse about it and hopes it will start changing  Encouragement was offered

## 2021-07-28 NOTE — TELEPHONE ENCOUNTER
Patient's son Vahe Hamilton called stating [employer] rep stated the letter was unacceptable  We would have to call & request a form to be faxed & completed by PCP  Vahe Allen provided ph# [phone] & ACCT [de-identified]   I called [employer] & spoke to [facility] she will be faxing form

## 2021-07-28 NOTE — NURSING NOTE
Pt reportedtonursingstation complaining of severe agitation  PRN Zyprexa given as ordered  Will continue to monitor

## 2021-07-28 NOTE — NURSING NOTE
Patient rested comfortably throughout the night with no issues  She is med compliant  Patient reported severe agitation and prn Zyprexa 5 mg tab given  No signs and symptoms of acute respiratory distress noted

## 2021-07-28 NOTE — TELEPHONE ENCOUNTER
Folder Color-Orange     Name of Form-[employer]    Form to be filled out by-Dr Leonard     Form to be Faxed [phone]    Patient made aware of 10 business day policy

## 2021-07-28 NOTE — PROGRESS NOTES
07/28/21 1149   Team Meeting   Meeting Type Daily Rounds   Initial Conference Date 07/28/21   Next Conference Date 07/29/21   Team Members Present   Team Members Present Physician;Nurse;;   Patient/Family Present   Patient Present No   Patient's Family Present No   Dr Brandy Patricia, FLORI Chacon, Dr Clemente Gandhi, T  Jung Pate Allis - somatic, uncooperative, slept, accuses nurse/staff of being prejudice, had PRN for agitation

## 2021-07-28 NOTE — TREATMENT TEAM
Pt attended 1 afternoon group  Pt was able to engage when prompted and less anxious  07/28/21 1330   Activity/Group Checklist   Group Other (Comment)  (conlfict resolution and communication)   Attendance Attended   Attendance Duration (min) 31-45   Interactions Interacted appropriately   Affect/Mood Appropriate   Goals Achieved Identified feelings; Discussed coping strategies; Able to listen to others; Able to engage in interactions; Able to manage/cope with feelings

## 2021-07-28 NOTE — PROGRESS NOTES
Progress Note - Behavioral Health   Samantha QUINONEZ Jason Iba 60 y o  female MRN: [id number]  Unit/Bed#: [facility] 392-17 Encounter: [id number]    Assessment/Plan   Principal Problem:    Bipolar II disorder ([address] )  Active Problems:    Chronic bilateral low back pain with bilateral sciatica    Chronic pain syndrome    Essential hypertension    History of hypokalemia    Opioid dependence (HCC)    Panic disorder without agoraphobia    Post traumatic stress disorder    Tardive dyskinesia    Primary osteoarthritis of both knees    Multiple closed fractures of metatarsal bone of right foot    History of CVA (cerebrovascular accident)    Medical clearance for psychiatric admission    Mixed hyperlipidemia    Leukopenia      Subjective:Patient was seen today for continuation of care, records reviewed and  patient was discussed with the morning case review team     Patient seen today for psychiatric follow up  She was cooperative, left her lunch to come speak with me in her room  She is in a wheelchair  She states she has tremors and is unable to eat properly due to the shaking in her hands  She reports that this has been going on "for a while now"  She is on Ingrezza for tardive dyskinesia  She is preoccupied with her medications and upset because she would like to have Zyprexa scheduled as opposed to PRN  She was educated on the adverse effects of the Zyprexa, which includes increased tremors, of which she was primarily complaining  She states that the tremors are from her "panic" and endorses racing heart, restlessness, and shortness of breath  She denies SI, denies HI at this time  Appetite is good  She reports trouble sleeping, she was encouraged to utilize her PRN mirtazapine  Denies any auditory or visual hallucinations  She otherwise does not report any   Physical issues at this time    Will order her seroquel to be scheduled 50mg PO QID and 50mg HS to provide a more frequent schedule throughout the day     Psychiatric Review of Systems:    Sleep: difficulty falling asleep  Appetite: normal  Medication side effects: No   ROS: reports hand tremors, all other systems are negative    Vitals:  Vitals:    07/28/21 1435   BP: 97/55   Pulse: 67   Resp: 16   Temp: 97 5 °F (36 4 °C)   SpO2: 100%       Mental Status Evaluation:    Appearance:  dressed appropriately   Behavior:  cooperative   Speech:  perseverative   Mood:  anxious   Affect:  labile   Thought Process:  perseverative   Associations: perseveration   Thought Content:  no overt delusions   Perceptual Disturbances: denies auditory or visual hallucinations when asked   Risk Potential: Suicidal ideation - None  Homicidal ideation - None  Potential for aggression - No   Sensorium:  oriented to person, place and time/date   Memory:  recent and remote memory grossly intact   Consciousness:  alert and awake   Attention: attention span and concentration are age appropriate   Insight:  limited   Judgment: limited   Gait/Station: uses walker, in wheelchair   Motor Activity: abnormal movement noted: hand tremor present     Laboratory results:    I have personally reviewed all pertinent laboratory/tests results    Most Recent Labs:   Lab Results   Component Value Date    WBC 5 60 07/23/2021    RBC 4 03 07/23/2021    HGB 12 2 07/23/2021    HCT 36 3 07/23/2021     07/23/2021    RDW 14 8 07/23/2021    NEUTROABS 3 40 07/23/2021    SODIUM 140 07/17/2021    K 4 0 07/17/2021     07/17/2021    CO2 25 07/17/2021    BUN 11 07/17/2021    CREATININE 0 63 07/17/2021    GLUC 96 07/17/2021    GLUF 96 07/17/2021    CALCIUM 8 9 07/17/2021    AST 12 07/11/2021    ALT 18 07/11/2021    ALKPHOS 104 07/11/2021    TP 7 8 07/11/2021    ALB 3 7 07/11/2021    TBILI 0 67 07/11/2021    CHOLESTEROL 215 (H) 02/13/2020    HDL 84 02/13/2020    TRIG 74 02/13/2020    LDLCALC 116 (H) 02/13/2020    [place] 131 02/13/2020    AMMONIA <10 (L) 06/27/2021    [id number] 2 060 06/30/2021    FREET4 0 80 02/13/2020    PREGSERUM Negative 02/22/2014    RPR Non-Reactive 06/29/2021    HGBA1C 4 8 02/08/2021    EAG 91 02/08/2021       Progress Toward Goals:     Unchanged  Patient continues to express symptoms of anxiety, will continue current medications, but with a revised frequency of the seroquel  Recommended Treatment:     All current active medications have been reviewed  Encourage group therapy, milieu therapy and occupational therapy  Behavioral Health checks every 7 minutes  Change Seroquel to 50mg PO QID and 50mg PO HS    Continue all other medications:  Current Facility-Administered Medications   Medication Dose Route Frequency Provider Last Rate    acetaminophen  975 mg Oral Q8H [address] MERCY Wright      aluminum-magnesium hydroxide-simethicone  30 mL Oral Q4H PRN MERCY Wright      amLODIPine  5 mg Oral Daily MERCY Wright      aspirin  81 mg Oral Daily MERCY Wright      atorvastatin  40 mg Oral Daily With MERCY Colvin      benztropine  1 mg Intramuscular Q4H PRN Max 6/day MERCY Wright      benztropine  0 5 mg Oral Q4H PRN Max 6/day MERCY Wright      busPIRone  20 mg Oral TID Ismael Stiles PA-C      Diclofenac Sodium  2 g Topical 4x Daily MERCY Wright      enoxaparin  40 mg Subcutaneous Daily MERCY Wright      folic acid  1,727 mcg Oral Daily MERCY Wright      hydrOXYzine HCL  50 mg Oral TID Ankita Ha MD      LORazepam  1 mg Intramuscular Q6H PRN Max 3/day MERCY Wright      mirtazapine  7 5 mg Oral HS PRN MERCY Wright      morphine  15 mg Oral Q12H [address] MERCY Wright      nicotine polacrilex  2 mg Oral Q2H PRN MERCY Wright      OLANZapine  5 mg Intramuscular Q3H PRN Max 3/day MERCY Wright      OLANZapine  5 mg Oral Q3H PRN Max 3/day MERCY Wright      ondansetron  4 mg Oral Q6H PRN MERCY Wirght      pantoprazole  20 mg Oral Early Morning MERCY Wright  PARoxetine  40 mg Oral Daily Dayne Aviles MD      polyethylene glycol  17 g Oral Daily PRN Valerio Chandler MD      potassium chloride  20 mEq Oral Daily MERCY Rashid      prazosin  2 mg Oral Daily Greta Lemme, MERCY      pregabalin  100 mg Oral BID MERCY Rashid      propranolol  10 mg Oral BID before breakfast/lunch Dayne Aviles MD      QUEtiapine  100 mg Oral HS MERCY Rashid      QUEtiapine  50 mg Oral TID Kamilla Denise PA-C      senna-docusate sodium  1 tablet Oral Daily PRN MERCY Rashid      Valbenazine Tosylate  80 mg Oral Daily MERCY Rashid         Risks / Benefits of Treatment:     Risks, benefits, and possible side effects of medications explained to patient and patient verbalizes understanding and agreement for treatment  Counseling / Coordination of Care:     Patient's progress reviewed with nursing staff  Medications, treatment progress and treatment plan reviewed with patient  Supportive counseling provided to the patient      MERCY Guillermo

## 2021-07-29 DIAGNOSIS — F31.81 BIPOLAR II DISORDER (HCC): ICD-10-CM

## 2021-07-29 PROCEDURE — 99232 SBSQ HOSP IP/OBS MODERATE 35: CPT | Performed by: PSYCHIATRY & NEUROLOGY

## 2021-07-29 RX ADMIN — HYDROXYZINE HYDROCHLORIDE 50 MG: 50 TABLET, FILM COATED ORAL at 08:50

## 2021-07-29 RX ADMIN — QUETIAPINE FUMARATE 50 MG: 50 TABLET ORAL at 17:02

## 2021-07-29 RX ADMIN — QUETIAPINE FUMARATE 50 MG: 50 TABLET ORAL at 12:41

## 2021-07-29 RX ADMIN — BUSPIRONE HYDROCHLORIDE 20 MG: 10 TABLET ORAL at 17:01

## 2021-07-29 RX ADMIN — BUSPIRONE HYDROCHLORIDE 20 MG: 10 TABLET ORAL at 08:50

## 2021-07-29 RX ADMIN — PROPRANOLOL HYDROCHLORIDE 10 MG: 10 TABLET ORAL at 12:41

## 2021-07-29 RX ADMIN — PHENYTOIN 2 MG: 125 SUSPENSION ORAL at 08:50

## 2021-07-29 RX ADMIN — QUETIAPINE FUMARATE 50 MG: 50 TABLET ORAL at 21:34

## 2021-07-29 RX ADMIN — QUETIAPINE FUMARATE 50 MG: 50 TABLET ORAL at 21:32

## 2021-07-29 RX ADMIN — PREGABALIN 100 MG: 50 CAPSULE ORAL at 17:02

## 2021-07-29 RX ADMIN — PAROXETINE HYDROCHLORIDE 40 MG: 20 TABLET, FILM COATED ORAL at 08:50

## 2021-07-29 RX ADMIN — MORPHINE SULFATE 15 MG: 15 TABLET, EXTENDED RELEASE ORAL at 08:50

## 2021-07-29 RX ADMIN — ASPIRIN 81 MG CHEWABLE TABLET 81 MG: 81 TABLET CHEWABLE at 08:50

## 2021-07-29 RX ADMIN — BUSPIRONE HYDROCHLORIDE 20 MG: 10 TABLET ORAL at 20:08

## 2021-07-29 RX ADMIN — FOLIC ACID 1000 MCG: 1 TABLET ORAL at 08:50

## 2021-07-29 RX ADMIN — VALBENAZINE 80 MG: 80 CAPSULE ORAL at 08:55

## 2021-07-29 RX ADMIN — ENOXAPARIN SODIUM 40 MG: 40 INJECTION SUBCUTANEOUS at 08:50

## 2021-07-29 RX ADMIN — QUETIAPINE FUMARATE 50 MG: 50 TABLET ORAL at 08:50

## 2021-07-29 RX ADMIN — MIRTAZAPINE 7.5 MG: 7.5 TABLET, FILM COATED ORAL at 23:06

## 2021-07-29 RX ADMIN — ACETAMINOPHEN 975 MG: 325 TABLET ORAL at 13:38

## 2021-07-29 RX ADMIN — HYDROXYZINE HYDROCHLORIDE 50 MG: 50 TABLET, FILM COATED ORAL at 20:07

## 2021-07-29 RX ADMIN — MORPHINE SULFATE 15 MG: 15 TABLET, EXTENDED RELEASE ORAL at 20:08

## 2021-07-29 RX ADMIN — ACETAMINOPHEN 975 MG: 325 TABLET ORAL at 06:07

## 2021-07-29 RX ADMIN — ACETAMINOPHEN 975 MG: 325 TABLET ORAL at 21:31

## 2021-07-29 RX ADMIN — DICLOFENAC SODIUM 2 G: 10 GEL TOPICAL at 22:19

## 2021-07-29 RX ADMIN — PANTOPRAZOLE SODIUM 20 MG: 20 TABLET, DELAYED RELEASE ORAL at 06:07

## 2021-07-29 RX ADMIN — ATORVASTATIN CALCIUM 40 MG: 40 TABLET, FILM COATED ORAL at 17:01

## 2021-07-29 RX ADMIN — HYDROXYZINE HYDROCHLORIDE 50 MG: 50 TABLET, FILM COATED ORAL at 17:01

## 2021-07-29 RX ADMIN — POTASSIUM CHLORIDE 20 MEQ: 20 TABLET, EXTENDED RELEASE ORAL at 08:51

## 2021-07-29 RX ADMIN — PREGABALIN 100 MG: 50 CAPSULE ORAL at 08:51

## 2021-07-29 RX ADMIN — AMLODIPINE BESYLATE 5 MG: 5 TABLET ORAL at 08:50

## 2021-07-29 RX ADMIN — PROPRANOLOL HYDROCHLORIDE 10 MG: 10 TABLET ORAL at 06:07

## 2021-07-29 NOTE — NURSING NOTE
Pt visible in Dayroom after Breakfast socializing with select Peers  Pleasant on approach, calm, cooperative with medication administration  Pt preoccupied about PRN medications, specifically IM Ativan  Pt reports 7/10 depression 4/4 anxiety, denies all other psych s/s  Use of coping skills reviewed with Pt  Pt also reports 7/10 back and buttock pain  Pt refused Scheduled Voltaren Gel, stating, "I feel like it doesn't work that much in the morning, I'll just see if my other medications I have now help"  Pt witnessed by staff to be demanding with Peers about a specific chair in the Dayroom  Pt counseled on appropriate use of furniture in [place] [id number] and communication with Those in Milieu  Will continue to encourage Pt to attend groups  Pt noted to have flat affect In early afternoon, stating, "Im just feeling down today"  This writer offered self for conversation, Pt stated she did not want to talk; but communicated that she felt sad about being here at [address] [facility]  Pt counseled and reassured  Will continue to encourage Pt to socialize with Peers

## 2021-07-29 NOTE — PLAN OF CARE
Problem: Alteration in Thoughts and Perception  Goal: Verbalize thoughts and feelings  Description: Interventions:  - Promote a nonjudgmental and trusting relationship with the patient through active listening and therapeutic communication  - Assess patient's level of functioning, behavior and potential for risk  - Engage patient in 1 on 1 interactions  - Encourage patient to express fears, feelings, frustrations, and discuss symptoms    - [place] patient to reality, help patient recognize reality-based thinking   - Administer medications as ordered and assess for potential side effects  - Provide the patient education related to the signs and symptoms of the illness and desired effects of prescribed medications  Outcome: Progressing  Goal: Attend and participate in unit activities, including therapeutic, recreational, and educational groups  Description: Interventions:  -Encourage Visitation and family involvement in care  Outcome: Progressing     Problem: Ineffective Coping  Goal: Identifies ineffective coping skills  Outcome: Progressing  Goal: Identifies healthy coping skills  Outcome: Progressing
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
## 2021-07-29 NOTE — PROGRESS NOTES
Pt attended ALL groups and constricted and anxious affect  Pt states she does not have her glasses and can't read        07/29/21 1000   Activity/Group Checklist   Group Other (Comment)  (  relapse prevention plan )   Attendance Attended   Attendance Duration (min) 31-45   Interactions Interacted appropriately   Affect/Mood Other (Comment)  (anxious)   Goals Achieved Identified feelings; Discussed coping strategies; Able to listen to others; Able to engage in interactions; Able to reflect/comment on own behavior

## 2021-07-29 NOTE — NURSING NOTE
Patient visible on the unit the majority of shift, socializes with select peers and staff  Patient brightens when approached, reports 7/10 depression and 4/4 anxiety  Denies SI/HI/AH/VH  Patient states her anxiety is higher due to medication changes being "a waste of time"  Patient reassured  Compliant with medications and meals, appetite good  Patient given PRN Remeron at 2202 for sleeplessness  VSS  Will continue to monitor frequently

## 2021-07-29 NOTE — PROGRESS NOTES
07/29/21 0837   Team Meeting   Meeting Type Daily Rounds   Initial Conference Date 07/29/21   Next Conference Date 07/30/21   Team Members Present   Team Members Present Physician;Nurse;;   Patient/Family Present   Patient Present No   Patient's Family Present No    Dr Milli Cruz, Dr Theo Minors Ulysses Lory [address]  Anxiety, 7/10, 4/4, demanding Ativan IM, med changes made, slept

## 2021-07-29 NOTE — PLAN OF CARE
Problem: Alteration in Thoughts and Perception  Goal: Treatment Goal: Gain control of psychotic behaviors/thinking, reduce/eliminate presenting symptoms and demonstrate improved reality functioning upon discharge  Outcome: Progressing  Goal: Verbalize thoughts and feelings  Description: Interventions:  - Promote a nonjudgmental and trusting relationship with the patient through active listening and therapeutic communication  - Assess patient's level of functioning, behavior and potential for risk  - Engage patient in 1 on 1 interactions  - Encourage patient to express fears, feelings, frustrations, and discuss symptoms    - [place] patient to reality, help patient recognize reality-based thinking   - Administer medications as ordered and assess for potential side effects  - Provide the patient education related to the signs and symptoms of the illness and desired effects of prescribed medications  Outcome: Progressing  Goal: Refrain from acting on delusional thinking/internal stimuli  Description: Interventions:  - Monitor patient closely, per order   - Utilize least restrictive measures   - Set reasonable limits, give positive feedback for acceptable   - Administer medications as ordered and monitor of potential side effects  Outcome: Progressing  Goal: Agree to be compliant with medication regime, as prescribed and report medication side effects  Description: Interventions:  - Offer appropriate PRN medication and supervise ingestion; conduct AIMS, as needed   Outcome: Progressing  Goal: Attend and participate in unit activities, including therapeutic, recreational, and educational groups  Description: Interventions:  -Encourage Visitation and family involvement in care  Outcome: Progressing  Goal: Complete daily ADLs, including personal hygiene independently, as able  Description: Interventions:  - Observe, teach, and assist patient with ADLS  - Monitor and promote a balance of rest/activity, with adequate nutrition and elimination   Outcome: Progressing     Problem: Ineffective Coping  Goal: Identifies healthy coping skills  Outcome: Progressing  Goal: Patient/Family participate in treatment and DC plans  Description: Interventions:  - Provide therapeutic environment  Outcome: Progressing  Goal: Patient/Family verbalizes awareness of resources  Outcome: Progressing     Problem: Anxiety  Goal: Anxiety is at manageable level  Description: Interventions:  - Assess and monitor patient's anxiety level  - Monitor for signs and symptoms (heart palpitations, chest pain, shortness of breath, headaches, nausea, feeling jumpy, restlessness, irritable, apprehensive)  - Collaborate with interdisciplinary team and initiate plan and interventions as ordered  - [place] patient to unit/surroundings  - Explain treatment plan  - Encourage participation in care  - Encourage verbalization of concerns/fears  - Identify coping mechanisms  - Assist in developing anxiety-reducing skills  - Administer/offer alternative therapies  - Limit or eliminate stimulants  Outcome: Progressing     Problem: BEHAVIOR  Goal: Pt/Family maintain appropriate behavior and adhere to behavioral management agreement, if implemented  Description: INTERVENTIONS:  - Assess the family dynamic   - Encourage verbalization of thoughts and concerns in a socially appropriate manner  - Assess patient/family's coping skills and non-compliant behavior (including use of illegal substances)  - Utilize positive, consistent limit setting strategies supporting safety of patient, staff and others  - Initiate consult with Case Management, Spiritual Care or other ancillary services as appropriate  - If a patient's/visitor's behavior jeopardizes the safety of the patient, staff, or others, refer to organization procedure     - Notify Security of behavior or suspected illegal substances which indicate the need for search of the patient and/or belongings  - Encourage participation in the decision making process about a behavioral management agreement; implement if patient meets criteria  Outcome: Progressing     Problem: ANXIETY  Goal: Will report anxiety at manageable levels  Description: INTERVENTIONS:  - Administer medication as ordered  - Teach and encourage coping skills  - Provide emotional support  - Assess patient/family for anxiety and ability to cope  Outcome: Progressing  Goal: By discharge: Patient will verbalize 2 strategies to deal with anxiety  Description: Interventions:  - Identify any obvious source/trigger to anxiety  - Staff will assist patient in applying identified coping technique/skills  - Encourage attendance of scheduled groups and activities  Outcome: Progressing     Problem: Ineffective Coping  Goal: Participates in unit activities  Description: Interventions:  - Provide therapeutic environment   - Provide required programming   - Redirect inappropriate behaviors   Outcome: Progressing     Problem: Alteration in Thoughts and Perception  Goal: Recognize dysfunctional thoughts, communicate reality-based thoughts at the time of discharge  Description: Interventions:  - Provide medication and psycho-education to assist patient in compliance and developing insight into his/her illness   Outcome: Not Progressing     Problem: Ineffective Coping  Goal: Identifies ineffective coping skills  Outcome: Not Progressing  Goal: Demonstrates healthy coping skills  Outcome: Not Progressing

## 2021-07-30 DIAGNOSIS — F31.81 BIPOLAR II DISORDER (HCC): ICD-10-CM

## 2021-07-30 DIAGNOSIS — R41.82 ALTERED MENTAL STATUS: ICD-10-CM

## 2021-07-30 DIAGNOSIS — R11.0 NAUSEA: ICD-10-CM

## 2021-07-30 PROCEDURE — 99232 SBSQ HOSP IP/OBS MODERATE 35: CPT | Performed by: PSYCHIATRY & NEUROLOGY

## 2021-07-30 RX ORDER — QUETIAPINE FUMARATE 50 MG/1
50 TABLET, FILM COATED ORAL 2 TIMES DAILY
Status: DISCONTINUED | OUTPATIENT
Start: 2021-07-30 | End: 2021-08-02

## 2021-07-30 RX ORDER — LITHIUM CARBONATE 300 MG/1
300 TABLET, FILM COATED, EXTENDED RELEASE ORAL
Status: DISCONTINUED | OUTPATIENT
Start: 2021-07-30 | End: 2021-08-05 | Stop reason: HOSPADM

## 2021-07-30 RX ADMIN — FOLIC ACID 1000 MCG: 1 TABLET ORAL at 08:25

## 2021-07-30 RX ADMIN — DICLOFENAC SODIUM 2 G: 10 GEL TOPICAL at 21:07

## 2021-07-30 RX ADMIN — LITHIUM CARBONATE 300 MG: 300 TABLET, EXTENDED RELEASE ORAL at 21:07

## 2021-07-30 RX ADMIN — PROPRANOLOL HYDROCHLORIDE 10 MG: 10 TABLET ORAL at 06:12

## 2021-07-30 RX ADMIN — PHENYTOIN 2 MG: 125 SUSPENSION ORAL at 08:26

## 2021-07-30 RX ADMIN — PREGABALIN 100 MG: 50 CAPSULE ORAL at 08:25

## 2021-07-30 RX ADMIN — DICLOFENAC SODIUM 2 G: 10 GEL TOPICAL at 17:08

## 2021-07-30 RX ADMIN — ACETAMINOPHEN 975 MG: 325 TABLET ORAL at 06:12

## 2021-07-30 RX ADMIN — ACETAMINOPHEN 975 MG: 325 TABLET ORAL at 21:06

## 2021-07-30 RX ADMIN — MORPHINE SULFATE 15 MG: 15 TABLET, EXTENDED RELEASE ORAL at 20:25

## 2021-07-30 RX ADMIN — AMLODIPINE BESYLATE 5 MG: 5 TABLET ORAL at 08:25

## 2021-07-30 RX ADMIN — VALBENAZINE 80 MG: 80 CAPSULE ORAL at 08:30

## 2021-07-30 RX ADMIN — QUETIAPINE FUMARATE 50 MG: 50 TABLET ORAL at 08:25

## 2021-07-30 RX ADMIN — HYDROXYZINE HYDROCHLORIDE 50 MG: 50 TABLET, FILM COATED ORAL at 16:44

## 2021-07-30 RX ADMIN — QUETIAPINE FUMARATE 50 MG: 50 TABLET ORAL at 17:08

## 2021-07-30 RX ADMIN — PAROXETINE HYDROCHLORIDE 40 MG: 20 TABLET, FILM COATED ORAL at 08:25

## 2021-07-30 RX ADMIN — HYDROXYZINE HYDROCHLORIDE 50 MG: 50 TABLET, FILM COATED ORAL at 20:25

## 2021-07-30 RX ADMIN — ASPIRIN 81 MG CHEWABLE TABLET 81 MG: 81 TABLET CHEWABLE at 08:25

## 2021-07-30 RX ADMIN — PREGABALIN 100 MG: 50 CAPSULE ORAL at 17:08

## 2021-07-30 RX ADMIN — BUSPIRONE HYDROCHLORIDE 20 MG: 10 TABLET ORAL at 08:25

## 2021-07-30 RX ADMIN — POTASSIUM CHLORIDE 20 MEQ: 20 TABLET, EXTENDED RELEASE ORAL at 08:26

## 2021-07-30 RX ADMIN — PROPRANOLOL HYDROCHLORIDE 10 MG: 10 TABLET ORAL at 11:07

## 2021-07-30 RX ADMIN — HYDROXYZINE HYDROCHLORIDE 50 MG: 50 TABLET, FILM COATED ORAL at 08:25

## 2021-07-30 RX ADMIN — QUETIAPINE FUMARATE 50 MG: 50 TABLET ORAL at 12:01

## 2021-07-30 RX ADMIN — MORPHINE SULFATE 15 MG: 15 TABLET, EXTENDED RELEASE ORAL at 08:25

## 2021-07-30 RX ADMIN — ATORVASTATIN CALCIUM 40 MG: 40 TABLET, FILM COATED ORAL at 16:44

## 2021-07-30 RX ADMIN — BUSPIRONE HYDROCHLORIDE 20 MG: 10 TABLET ORAL at 20:25

## 2021-07-30 RX ADMIN — DICLOFENAC SODIUM 2 G: 10 GEL TOPICAL at 12:01

## 2021-07-30 RX ADMIN — ACETAMINOPHEN 975 MG: 325 TABLET ORAL at 14:21

## 2021-07-30 RX ADMIN — BUSPIRONE HYDROCHLORIDE 20 MG: 10 TABLET ORAL at 16:44

## 2021-07-30 RX ADMIN — QUETIAPINE FUMARATE 50 MG: 50 TABLET ORAL at 21:06

## 2021-07-30 RX ADMIN — PANTOPRAZOLE SODIUM 20 MG: 20 TABLET, DELAYED RELEASE ORAL at 06:12

## 2021-07-30 NOTE — PLAN OF CARE
Problem: Alteration in Thoughts and Perception  Goal: Treatment Goal: Gain control of psychotic behaviors/thinking, reduce/eliminate presenting symptoms and demonstrate improved reality functioning upon discharge  Outcome: Progressing  Goal: Verbalize thoughts and feelings  Description: Interventions:  - Promote a nonjudgmental and trusting relationship with the patient through active listening and therapeutic communication  - Assess patient's level of functioning, behavior and potential for risk  - Engage patient in 1 on 1 interactions  - Encourage patient to express fears, feelings, frustrations, and discuss symptoms    - [place] patient to reality, help patient recognize reality-based thinking   - Administer medications as ordered and assess for potential side effects  - Provide the patient education related to the signs and symptoms of the illness and desired effects of prescribed medications  Outcome: Progressing  Goal: Refrain from acting on delusional thinking/internal stimuli  Description: Interventions:  - Monitor patient closely, per order   - Utilize least restrictive measures   - Set reasonable limits, give positive feedback for acceptable   - Administer medications as ordered and monitor of potential side effects  Outcome: Progressing  Goal: Agree to be compliant with medication regime, as prescribed and report medication side effects  Description: Interventions:  - Offer appropriate PRN medication and supervise ingestion; conduct AIMS, as needed   Outcome: Progressing  Goal: Attend and participate in unit activities, including therapeutic, recreational, and educational groups  Description: Interventions:  -Encourage Visitation and family involvement in care  Outcome: Progressing  Goal: Recognize dysfunctional thoughts, communicate reality-based thoughts at the time of discharge  Description: Interventions:  - Provide medication and psycho-education to assist patient in compliance and developing insight into his/her illness   Outcome: Progressing  Goal: Complete daily ADLs, including personal hygiene independently, as able  Description: Interventions:  - Observe, teach, and assist patient with ADLS  - Monitor and promote a balance of rest/activity, with adequate nutrition and elimination   Outcome: Progressing     Problem: Ineffective Coping  Goal: Identifies ineffective coping skills  Outcome: Progressing  Goal: Identifies healthy coping skills  Outcome: Progressing  Goal: Demonstrates healthy coping skills  Outcome: Progressing  Goal: Patient/Family participate in treatment and DC plans  Description: Interventions:  - Provide therapeutic environment  Outcome: Progressing  Goal: Patient/Family verbalizes awareness of resources  Outcome: Progressing     Problem: Anxiety  Goal: Anxiety is at manageable level  Description: Interventions:  - Assess and monitor patient's anxiety level  - Monitor for signs and symptoms (heart palpitations, chest pain, shortness of breath, headaches, nausea, feeling jumpy, restlessness, irritable, apprehensive)  - Collaborate with interdisciplinary team and initiate plan and interventions as ordered  - [place] patient to unit/surroundings  - Explain treatment plan  - Encourage participation in care  - Encourage verbalization of concerns/fears  - Identify coping mechanisms  - Assist in developing anxiety-reducing skills  - Administer/offer alternative therapies  - Limit or eliminate stimulants  Outcome: Progressing     Problem: BEHAVIOR  Goal: Pt/Family maintain appropriate behavior and adhere to behavioral management agreement, if implemented  Description: INTERVENTIONS:  - Assess the family dynamic   - Encourage verbalization of thoughts and concerns in a socially appropriate manner  - Assess patient/family's coping skills and non-compliant behavior (including use of illegal substances)    - Utilize positive, consistent limit setting strategies supporting safety of patient, staff and others  - Initiate consult with Case Management, Spiritual Care or other ancillary services as appropriate  - If a patient's/visitor's behavior jeopardizes the safety of the patient, staff, or others, refer to organization procedure     - Notify Security of behavior or suspected illegal substances which indicate the need for search of the patient and/or belongings  - Encourage participation in the decision making process about a behavioral management agreement; implement if patient meets criteria  Outcome: Progressing     Problem: ANXIETY  Goal: Will report anxiety at manageable levels  Description: INTERVENTIONS:  - Administer medication as ordered  - Teach and encourage coping skills  - Provide emotional support  - Assess patient/family for anxiety and ability to cope  Outcome: Progressing  Goal: By discharge: Patient will verbalize 2 strategies to deal with anxiety  Description: Interventions:  - Identify any obvious source/trigger to anxiety  - Staff will assist patient in applying identified coping technique/skills  - Encourage attendance of scheduled groups and activities  Outcome: Progressing     Problem: PAIN - ADULT  Goal: Verbalizes/displays adequate comfort level or baseline comfort level  Description: Interventions:  - Encourage patient to monitor pain and request assistance  - Assess pain using appropriate pain scale  - Administer analgesics based on type and severity of pain and evaluate response  - Implement non-pharmacological measures as appropriate and evaluate response  - Consider cultural and social influences on pain and pain management  - Notify physician/advanced practitioner if interventions unsuccessful or patient reports new pain  Outcome: Progressing     Problem: DISCHARGE PLANNING  Goal: Discharge to home or other facility with appropriate resources  Description: INTERVENTIONS:  - Identify barriers to discharge w/patient and caregiver  - Arrange for needed discharge resources and transportation as appropriate  - Identify discharge learning needs (meds, wound care, etc )  - Arrange for interpretive services to assist at discharge as needed  - Refer to Case Management Department for coordinating discharge planning if the patient needs post-hospital services based on physician/advanced practitioner order or complex needs related to functional status, cognitive ability, or social support system  Outcome: Progressing     Problem: Ineffective Coping  Goal: Participates in unit activities  Description: Interventions:  - Provide therapeutic environment   - Provide required programming   - Redirect inappropriate behaviors   Outcome: Progressing

## 2021-07-30 NOTE — NURSING NOTE
Pt currently rates depression 10-10 and anxiety 4-4  Pt says she has felt this way starting at 6 pm  Pt currently denies SI, HI, VH  Pt reports hearing voices saying "you aren't going to make it"  Pt reports "I need meds that are going to work  I feel like when I lay down I need to get back up"  Pt is pleasant on approach  Pt requested medication for anxiety and educated on scheduled medication upcoming  Pt understanding and accepting at this time  Pt then asked if she could receive medication sooner  Educated again and verbalized understanding  Pt is visible back and forth from dining room and bedroom

## 2021-07-30 NOTE — NURSING NOTE
Patient visible on the milieu tonight  She alternated between sitting by the nursing station and watching TV with her peers in the dining room  She needed encouragement to use her own bathroom (insistent she wanted to use the seclusion bathroom)  She rated her depression a 8 on a 1 to 10 scale and anxiety a 4 on a 1 to 4 scale  She denied having any hallucinations or suicidal/homicidal thoughts  She was cooperative with taking her scheduled afternoon and evening medications  Safety plan was reviewed with the patient and staff availability was reinforced

## 2021-07-30 NOTE — PROGRESS NOTES
07/30/21 0851   Team Meeting   Meeting Type Daily Rounds   Team Members Present   Team Members Present Physician;Nurse;;   Physician Team Member [facility]   Nursing Team Member [address] [place] [address] Management Team Member [facility]   Social Work Team Member [employer]   Patient/Family Present   Patient Present No   Patient's Family Present No     Patient reports multiple medical concerns, she was placed in seclusion for yelling, she reported auditory hallucinations, she slept

## 2021-07-30 NOTE — PROGRESS NOTES
Pt attended afternoon group  Pt was upset when told that a peer could not get her a drink of water and that she had to get it herself  Pt was able to display recall and spontaneous recollection  07/30/21 1330   Activity/Group Checklist   Group Other (Comment)  (music trivia and reminscence)   Attendance Attended   Attendance Duration (min) 31-45   Interactions Interacted appropriately   Affect/Mood Appropriate   Goals Achieved Identified feelings; Discussed coping strategies; Able to listen to others; Able to engage in interactions

## 2021-07-30 NOTE — PROGRESS NOTES
Progress Note - Behavioral Health   Samantha Valdez 60 y o  female MRN: [id number]  Unit/Bed#: [facility] 258-87 Encounter: [id number]    Samantha was seen for follow-up, chart reviewed and discussed with treatment team   Treatment staff notes she remains somatically preoccupied with episodes of yelling out  Was placed in seclusion yesterday for yelling  Patient seen seated in her wheelchair  She is very anxious and repetitive and easily tearful  Stating that she needs medications are going to help her  Reports that she has significant anxiety and feels very restless  States that when she lies down she feels like she needs to get back up likely due to akathisia  She reports that she is not sleeping at night  Reports that her appetite is decreased  Reports auditory hallucinations but states she hears mumbling  Denies command hallucinations  Currently denies any suicidal or homicidal ideation  She has been taking her medications as prescribed  Reports that she has pain all over  Unable to specify further  Discussed medications with patient and states that she has never taken lithium before  She is in agreement with treatment plan      Behavior over the last 24 hours:  unchanged  Sleep: insomnia  Appetite: poor  Medication side effects:  Yes  ROS as noted in HPI  /80 (BP Location: Left arm)   Pulse 84   Temp (!) 97 2 °F (36 2 °C) (Temporal)   Resp 18   Ht 5' 1" (1 549 m)   Wt 90 2 kg (198 lb 13 7 oz)   SpO2 100%   BMI 37 57 kg/m²     Medications:   Current Facility-Administered Medications   Medication Dose Route Frequency    acetaminophen (TYLENOL) tablet 975 mg  975 mg Oral Q8H [facility] & halfway    aluminum-magnesium hydroxide-simethicone (MYLANTA) oral suspension 30 mL  30 mL Oral Q4H PRN    amLODIPine (NORVASC) tablet 5 mg  5 mg Oral Daily    aspirin chewable tablet 81 mg  81 mg Oral Daily    atorvastatin (LIPITOR) tablet 40 mg  40 mg Oral Daily With Dinner    benztropine (COGENTIN) injection 1 mg 1 mg Intramuscular Q4H PRN Max 6/day    benztropine (COGENTIN) tablet 0 5 mg  0 5 mg Oral Q4H PRN Max 6/day    busPIRone (BUSPAR) tablet 20 mg  20 mg Oral TID    Diclofenac Sodium (VOLTAREN) 1 % topical gel 2 g  2 g Topical 4x Daily    enoxaparin (LOVENOX) subcutaneous injection 40 mg  40 mg Subcutaneous Daily    folic acid (FOLVITE) tablet 1,000 mcg  1,000 mcg Oral Daily    hydrOXYzine HCL (ATARAX) tablet 50 mg  50 mg Oral TID    lithium carbonate (LITHOBID) CR tablet 300 mg  300 mg Oral HS    LORazepam (ATIVAN) injection 1 mg  1 mg Intramuscular Q6H PRN Max 3/day    mirtazapine (REMERON) tablet 7 5 mg  7 5 mg Oral HS PRN    morphine (MS CONTIN) ER tablet 15 mg  15 mg Oral Q12H [facility]    nicotine polacrilex (NICORETTE) gum 2 mg  2 mg Oral Q2H PRN    OLANZapine (ZyPREXA) IM injection 5 mg  5 mg Intramuscular Q3H PRN Max 3/day    OLANZapine (ZyPREXA) tablet 5 mg  5 mg Oral Q3H PRN Max 3/day    ondansetron (ZOFRAN-ODT) dispersible tablet 4 mg  4 mg Oral Q6H PRN    pantoprazole (PROTONIX) EC tablet 20 mg  20 mg Oral Early Morning    PARoxetine (PAXIL) tablet 40 mg  40 mg Oral Daily    polyethylene glycol (MIRALAX) packet 17 g  17 g Oral Daily PRN    potassium chloride (K-DUR,KLOR-CON) CR tablet 20 mEq  20 mEq Oral Daily    prazosin (MINIPRESS) capsule 2 mg  2 mg Oral Daily    pregabalin (LYRICA) capsule 100 mg  100 mg Oral BID    propranolol (INDERAL) tablet 10 mg  10 mg Oral BID before breakfast/lunch    QUEtiapine (SEROquel) tablet 50 mg  50 mg Oral HS    QUEtiapine (SEROquel) tablet 50 mg  50 mg Oral BID    senna-docusate sodium (SENOKOT S) 8 6-50 mg per tablet 1 tablet  1 tablet Oral Daily PRN    Valbenazine Tosylate CAPS 80 mg  80 mg Oral Daily       Labs:   Admission on 07/15/2021   Component Date Value Ref Range Status    Sodium 07/17/2021 140  137 - 147 mmol/L Final    Potassium 07/17/2021 4 0  3 6 - 5 0 mmol/L Final    Chloride 07/17/2021 106  97 - 108 mmol/L Final    CO2 07/17/2021 25  22 - 30 mmol/L Final    ANION GAP 07/17/2021 9  5 - 14 mmol/L Final    BUN 07/17/2021 11  5 - 25 mg/dL Final    Creatinine 07/17/2021 0 63  0 60 - 1 20 mg/dL Final    Glucose 07/17/2021 96  70 - 99 mg/dL Final    Glucose, Fasting 07/17/2021 96  70 - 99 mg/dL Final    Calcium 07/17/2021 8 9  8 4 - 10 2 mg/dL Final    eGFR 07/17/2021 115  >60 ml/min/1 73sq m Final    WBC 07/18/2021 4 00* 4 50 - 11 00 Thousand/uL Final    RBC 07/18/2021 4 24  4 00 - 5 20 Million/uL Final    Hemoglobin 07/18/2021 12 6  12 0 - 16 0 g/dL Final    Hematocrit 07/18/2021 37 9  36 0 - 46 0 % Final    MCV 07/18/2021 90  80 - 100 fL Final    MCH 07/18/2021 29 6  26 0 - 34 0 pg Final    MCHC 07/18/2021 33 1  31 0 - 36 0 g/dL Final    RDW 07/18/2021 14 8  <15 3 % Final    MPV 07/18/2021 7 4* 8 9 - 12 7 fL Final    Platelets 95/70/0797 300  150 - 450 Thousands/uL Final    Neutrophils Relative 07/18/2021 42* 45 - 65 % Final    Lymphocytes Relative 07/18/2021 44  25 - 45 % Final    Monocytes Relative 07/18/2021 8  1 - 10 % Final    Eosinophils Relative 07/18/2021 4  0 - 6 % Final    Basophils Relative 07/18/2021 1  0 - 1 % Final    Neutrophils Absolute 07/18/2021 1 70* 1 80 - 7 80 Thousands/µL Final    Lymphocytes Absolute 07/18/2021 1 80  0 50 - 4 00 Thousands/µL Final    Monocytes Absolute 07/18/2021 0 30  0 20 - 0 90 Thousand/µL Final    Eosinophils Absolute 07/18/2021 0 20  0 00 - 0 40 Thousand/µL Final    Basophils Absolute 07/18/2021 0 00  0 00 - 0 10 Thousands/µL Final    WBC 07/20/2021 6 10  4 50 - 11 00 Thousand/uL Final    RBC 07/20/2021 4 25  4 00 - 5 20 Million/uL Final    Hemoglobin 07/20/2021 12 7  12 0 - 16 0 g/dL Final    Hematocrit 07/20/2021 38 3  36 0 - 46 0 % Final    MCV 07/20/2021 90  80 - 100 fL Final    MCH 07/20/2021 30 0  26 0 - 34 0 pg Final    MCHC 07/20/2021 33 3  31 0 - 36 0 g/dL Final    RDW 07/20/2021 14 8  <15 3 % Final    MPV 07/20/2021 7 3* 8 9 - 12 7 fL Final    Platelets 07/20/2021 301  150 - 450 Thousands/uL Final    Neutrophils Relative 07/20/2021 62  45 - 65 % Final    Lymphocytes Relative 07/20/2021 28  25 - 45 % Final    Monocytes Relative 07/20/2021 7  1 - 10 % Final    Eosinophils Relative 07/20/2021 1  0 - 6 % Final    Basophils Relative 07/20/2021 1  0 - 1 % Final    Neutrophils Absolute 07/20/2021 3 80  1 80 - 7 80 Thousands/µL Final    Lymphocytes Absolute 07/20/2021 1 70  0 50 - 4 00 Thousands/µL Final    Monocytes Absolute 07/20/2021 0 40  0 20 - 0 90 Thousand/µL Final    Eosinophils Absolute 07/20/2021 0 10  0 00 - 0 40 Thousand/µL Final    Basophils Absolute 07/20/2021 0 10  0 00 - 0 10 Thousands/µL Final    Color, UA 07/20/2021 Fransisca* Straw, Yellow, Pale Yellow Final    Clarity, UA 07/20/2021 Clear  Clear, Other Final    Specific Gravity, UA 07/20/2021 1 005  1 003 - 1 040 Final    pH, UA 07/20/2021 7 0  4 5, 5 0, 5 5, 6 0, 6 5, 7 0, 7 5, 8 0 Final    Leukocytes, UA 07/20/2021 25 0* Negative Final    Nitrite, UA 07/20/2021 Negative  Negative Final    Protein, UA 07/20/2021 Negative  Negative mg/dl Final    Glucose, UA 07/20/2021 Negative  Negative mg/dl Final    Ketones, UA 07/20/2021 5 (Trace)* Negative mg/dl Final    Bilirubin, UA 07/20/2021 Negative  Negative Final    Blood, UA 07/20/2021 Negative  Negative Final    UROBILINOGEN UA 07/20/2021 Negative  1 0, Negative mg/dL Final    RBC, UA 07/20/2021 None Seen  None Seen, 0-1, 1-2, 2-4, 0-5 /hpf Final    WBC, UA 07/20/2021 1-2  None Seen, 0-1, 1-2, 0-5, 2-4 /hpf Final    Epithelial Cells 07/20/2021 Occasional  None Seen, Occasional /hpf Final    Bacteria, UA 07/20/2021 Occasional  None Seen, Occasional /hpf Final    WBC 07/23/2021 5 60  4 50 - 11 00 Thousand/uL Final    RBC 07/23/2021 4 03  4 00 - 5 20 Million/uL Final    Hemoglobin 07/23/2021 12 2  12 0 - 16 0 g/dL Final    Hematocrit 07/23/2021 36 3  36 0 - 46 0 % Final    MCV 07/23/2021 90  80 - 100 fL Final    MCH 07/23/2021 30 3 26 0 - 34 0 pg Final    MCHC 07/23/2021 33 7  31 0 - 36 0 g/dL Final    RDW 07/23/2021 14 8  <15 3 % Final    MPV 07/23/2021 7 4* 8 9 - 12 7 fL Final    Platelets 55/90/1440 263  150 - 450 Thousands/uL Final    Neutrophils Relative 07/23/2021 60  45 - 65 % Final    Lymphocytes Relative 07/23/2021 30  25 - 45 % Final    Monocytes Relative 07/23/2021 6  1 - 10 % Final    Eosinophils Relative 07/23/2021 2  0 - 6 % Final    Basophils Relative 07/23/2021 1  0 - 1 % Final    Neutrophils Absolute 07/23/2021 3 40  1 80 - 7 80 Thousands/µL Final    Lymphocytes Absolute 07/23/2021 1 70  0 50 - 4 00 Thousands/µL Final    Monocytes Absolute 07/23/2021 0 40  0 20 - 0 90 Thousand/µL Final    Eosinophils Absolute 07/23/2021 0 10  0 00 - 0 40 Thousand/µL Final    Basophils Absolute 07/23/2021 0 00  0 00 - 0 10 Thousands/µL Final       Mental Status Evaluation:  Appearance:  Adequate hygiene, variable eye contact   Behavior:  Restless, fidgety, psychomotor agitation   Speech:  pressured   Mood:  anxious and labile   Affect:  labile   Thought Process:  perserverative, disorganized   Thought Content:  delusions  somatic   Perceptual Disturbances: None   Risk Potential: Suicidal Ideations none, Homicidal Ideations none and Potential for Aggression No   Sensorium:  person, place and time/date   Cognition:  recent and remote memory grossly intact   Consciousness:  alert and awake    Attention: attention span appeared shorter than expected for age   Insight:  limited   Judgment: limited   Gait/Station: Uses walker   Motor Activity: Generalized lower extremity and upper extremity tremor noted     Progress Toward Goals:  No change    Assessment/Plan   Principal Problem:    Bipolar II disorder (HCC)  Active Problems:    Chronic bilateral low back pain with bilateral sciatica    Chronic pain syndrome    Essential hypertension    History of hypokalemia    Opioid dependence ([facility])    Panic disorder without agoraphobia    Post traumatic stress disorder    Tardive dyskinesia    Primary osteoarthritis of both knees    Multiple closed fractures of metatarsal bone of right foot    History of CVA (cerebrovascular accident)    Medical clearance for psychiatric admission    Mixed hyperlipidemia    Leukopenia      Recommended Treatment:   Start lithium 300 mg at bedtime with plan to titrate as tolerated  Will check level in five days  Lab work was reviewed and renal function is stable, GFR was 115 on July 17, 2021    Decrease Seroquel to 50 mg b i d  And 50 mg at bedtime  Minimal benefit noted with increased doses Seroquel and concerns for patient's history of dystonia      Decrease BuSpar to 15 mg t i d , has been tapered from 30 mg 3 times a day due to concerns of polypharmacy    Paroxetine 40 mg daily    Hydroxyzine 50 mg t i d  Ingrezza 80 mg daily    Continue with group therapy, milieu therapy and occupational therapy  Risks, benefits and possible side effects of Medications:   Risks, benefits, and possible side effects of medications explained to patient and patient verbalizes understanding  Counseling / Coordination of Care  Total floor / unit time spent today 30 minutes  Greater than 50% of total time was spent with the patient and / or family counseling and / or coordination of care   A description of the counseling / coordination of care:  Medication management, chart review, patient interview

## 2021-07-30 NOTE — CASE MANAGEMENT
Case Management Progress Note    Patient name Ossie Peabody  [facility] [id number]  : 1965 Date 2021       LOS (days): 15  Geometric Mean LOS (GMLOS) (days):   Days to GMLOS:        BUNDLE:      OBJECTIVE:  Pt is a 60y o  year old , black or  [2], female with Evangelical preference of None admitted on 7/15/2021  6:16 PM  Pt is admitted to [place] [address] at [address] [place] [address] with complaints of Bipolar II disorder ([facility] )  Current admission status: Inpatient Psych  Preferred Pharmacy:   [facility]/pharmacy #[id number] Coker - [address]  [address] [place] [address]  Phone: [phone] Fax: [phone]    Primary Care Provider: Eleazar Naqvi MD    PROGRESS NOTE:  This writer spoke with German Reveles with [facility] [address] [facility] regarding outpatient therapy services  Estelle reports Dr Jimenez had submitted a referral for outpatient therapy, however the appointment has not been scheduled  She will pass it on to the CM on the case

## 2021-07-30 NOTE — PLAN OF CARE
Problem: Alteration in Thoughts and Perception  Goal: Verbalize thoughts and feelings  Description: Interventions:  - Promote a nonjudgmental and trusting relationship with the patient through active listening and therapeutic communication  - Assess patient's level of functioning, behavior and potential for risk  - Engage patient in 1 on 1 interactions  - Encourage patient to express fears, feelings, frustrations, and discuss symptoms    - [place] patient to reality, help patient recognize reality-based thinking   - Administer medications as ordered and assess for potential side effects  - Provide the patient education related to the signs and symptoms of the illness and desired effects of prescribed medications  Outcome: Progressing  Goal: Attend and participate in unit activities, including therapeutic, recreational, and educational groups  Description: Interventions:  -Encourage Visitation and family involvement in care  Outcome: Progressing

## 2021-07-30 NOTE — NURSING NOTE
Pt visible on unit after Breakfast, cooperative on approach  Medication and meal compliant  Pt preoccupied with feeling of panic, stating she feels scared all of the time  Relaxation techniques reviewed, minimally effective  Pt in Dayroom, becoming tearful, loud in front of Peers  Pt wheeled self to quiet room under another RN's supervision  Pt heard yelling, "Oh lord I can't do this  I'm going to die"  calling out for staff to help her  Pt voided independently, calming self, wheeled self to dayroom  Reported by another RN, Pt witnessed to place self on ground from seat of Wheelchair in hallway  Pt noted to be sitting up on floor, then laying down when approached by staff  Pt refusing to get up off ground, helped up by two staff members  Pt continues to perseverate feelings of panic; Coping techniques reviewed with Pt  Pt states, "Those don't work for me anymore I need medication"  Pt reassured, continued to whail in front of nurses station calling out for all staff who passed by to help her  Pt received scheduled Seroquel 50mg at 1201; immediate relief in Pt observed  Pt wheeled self independently to dayroom  Pt noted to have Peers retrieve water and other belongings from around the unit  Limits enforced  Will continue to monitor frequently

## 2021-07-31 PROCEDURE — 99232 SBSQ HOSP IP/OBS MODERATE 35: CPT | Performed by: PSYCHIATRY & NEUROLOGY

## 2021-07-31 RX ADMIN — PREGABALIN 100 MG: 50 CAPSULE ORAL at 08:46

## 2021-07-31 RX ADMIN — ENOXAPARIN SODIUM 40 MG: 40 INJECTION SUBCUTANEOUS at 08:48

## 2021-07-31 RX ADMIN — LITHIUM CARBONATE 300 MG: 300 TABLET, EXTENDED RELEASE ORAL at 21:12

## 2021-07-31 RX ADMIN — PANTOPRAZOLE SODIUM 20 MG: 20 TABLET, DELAYED RELEASE ORAL at 06:20

## 2021-07-31 RX ADMIN — VALBENAZINE 80 MG: 80 CAPSULE ORAL at 08:46

## 2021-07-31 RX ADMIN — BUSPIRONE HYDROCHLORIDE 20 MG: 10 TABLET ORAL at 21:12

## 2021-07-31 RX ADMIN — PROPRANOLOL HYDROCHLORIDE 10 MG: 10 TABLET ORAL at 11:41

## 2021-07-31 RX ADMIN — HYDROXYZINE HYDROCHLORIDE 50 MG: 50 TABLET, FILM COATED ORAL at 16:24

## 2021-07-31 RX ADMIN — ACETAMINOPHEN 975 MG: 325 TABLET ORAL at 13:26

## 2021-07-31 RX ADMIN — FOLIC ACID 1000 MCG: 1 TABLET ORAL at 08:47

## 2021-07-31 RX ADMIN — MORPHINE SULFATE 15 MG: 15 TABLET, EXTENDED RELEASE ORAL at 08:45

## 2021-07-31 RX ADMIN — QUETIAPINE FUMARATE 50 MG: 50 TABLET ORAL at 21:12

## 2021-07-31 RX ADMIN — QUETIAPINE FUMARATE 50 MG: 50 TABLET ORAL at 17:05

## 2021-07-31 RX ADMIN — ASPIRIN 81 MG CHEWABLE TABLET 81 MG: 81 TABLET CHEWABLE at 08:46

## 2021-07-31 RX ADMIN — MIRTAZAPINE 7.5 MG: 7.5 TABLET, FILM COATED ORAL at 22:33

## 2021-07-31 RX ADMIN — ACETAMINOPHEN 975 MG: 325 TABLET ORAL at 06:20

## 2021-07-31 RX ADMIN — HYDROXYZINE HYDROCHLORIDE 50 MG: 50 TABLET, FILM COATED ORAL at 08:46

## 2021-07-31 RX ADMIN — PAROXETINE HYDROCHLORIDE 40 MG: 20 TABLET, FILM COATED ORAL at 08:47

## 2021-07-31 RX ADMIN — HYDROXYZINE HYDROCHLORIDE 50 MG: 50 TABLET, FILM COATED ORAL at 21:12

## 2021-07-31 RX ADMIN — ATORVASTATIN CALCIUM 40 MG: 40 TABLET, FILM COATED ORAL at 16:25

## 2021-07-31 RX ADMIN — MORPHINE SULFATE 15 MG: 15 TABLET, EXTENDED RELEASE ORAL at 21:12

## 2021-07-31 RX ADMIN — POTASSIUM CHLORIDE 20 MEQ: 20 TABLET, EXTENDED RELEASE ORAL at 08:47

## 2021-07-31 RX ADMIN — BUSPIRONE HYDROCHLORIDE 20 MG: 10 TABLET ORAL at 08:46

## 2021-07-31 RX ADMIN — ACETAMINOPHEN 975 MG: 325 TABLET ORAL at 21:12

## 2021-07-31 RX ADMIN — PREGABALIN 100 MG: 50 CAPSULE ORAL at 17:05

## 2021-07-31 RX ADMIN — QUETIAPINE FUMARATE 50 MG: 50 TABLET ORAL at 08:46

## 2021-07-31 RX ADMIN — BUSPIRONE HYDROCHLORIDE 20 MG: 10 TABLET ORAL at 16:24

## 2021-07-31 NOTE — PLAN OF CARE
Problem: Alteration in Thoughts and Perception  Goal: Treatment Goal: Gain control of psychotic behaviors/thinking, reduce/eliminate presenting symptoms and demonstrate improved reality functioning upon discharge  Outcome: Progressing  Goal: Verbalize thoughts and feelings  Description: Interventions:  - Promote a nonjudgmental and trusting relationship with the patient through active listening and therapeutic communication  - Assess patient's level of functioning, behavior and potential for risk  - Engage patient in 1 on 1 interactions  - Encourage patient to express fears, feelings, frustrations, and discuss symptoms    - [place] patient to reality, help patient recognize reality-based thinking   - Administer medications as ordered and assess for potential side effects  - Provide the patient education related to the signs and symptoms of the illness and desired effects of prescribed medications  Outcome: Progressing  Goal: Refrain from acting on delusional thinking/internal stimuli  Description: Interventions:  - Monitor patient closely, per order   - Utilize least restrictive measures   - Set reasonable limits, give positive feedback for acceptable   - Administer medications as ordered and monitor of potential side effects  Outcome: Progressing  Goal: Agree to be compliant with medication regime, as prescribed and report medication side effects  Description: Interventions:  - Offer appropriate PRN medication and supervise ingestion; conduct AIMS, as needed   Outcome: Progressing  Goal: Attend and participate in unit activities, including therapeutic, recreational, and educational groups  Description: Interventions:  -Encourage Visitation and family involvement in care  Outcome: Progressing  Goal: Recognize dysfunctional thoughts, communicate reality-based thoughts at the time of discharge  Description: Interventions:  - Provide medication and psycho-education to assist patient in compliance and developing insight into his/her illness   Outcome: Not Progressing  Goal: Complete daily ADLs, including personal hygiene independently, as able  Description: Interventions:  - Observe, teach, and assist patient with ADLS  - Monitor and promote a balance of rest/activity, with adequate nutrition and elimination   Outcome: Progressing

## 2021-07-31 NOTE — NURSING NOTE
Patient is medication and meal compliant  No complaints of pain or discomfort  Patient is visible and social on the unit  Patient reports her depression at 7/10 and anxiety at 4/4  No reported panic attacks noted  Will continue to monitor patient for changes in mood or behavior

## 2021-07-31 NOTE — PROGRESS NOTES
Progress Notes- Behavioral Health       Assessment/Plan    Principal Problem:  Bipolar II disorder ([address] )    PLAN:   1) Continue current medications  Her medications are changed  This writer will not change further as Dr Ino Chandler is working in a medication plan but if patient is too anxious or dose not sleep tonight  This writer will increase her Seroquel tomorrow  2) Continue Group and individual therapy  3) Discharge planing  4) discussed with staff  INTERVAL HISTORY:  Patient continue to be medication seeking  She was in a wheelchair and slowly wheeled herself back to her room  She said she is having a panic attack  She said that she is not happy that her medications were decreased  She was over talkative and asked this writer to give  her different medication for anxiety  When she came to know her Seroquel was decreased she became every upset    Scheduled Meds:    Scheduled Meds:  Current Facility-Administered Medications   Medication Dose Route Frequency Provider Last Rate    acetaminophen  975 mg Oral Q8H [address] Ryan Mash, CRNP      aluminum-magnesium hydroxide-simethicone  30 mL Oral Q4H PRN Ryan Mash, CRNP      amLODIPine  5 mg Oral Daily Ryan Mash, CRNP      aspirin  81 mg Oral Daily Ryan Mash, CRNP      atorvastatin  40 mg Oral Daily With Ruiz Jos, [facility]      benztropine  1 mg Intramuscular Q4H PRN Max 6/day Ryan Mash, CRNP      benztropine  0 5 mg Oral Q4H PRN Max 6/day Ryan Mash, CRNP      busPIRone  20 mg Oral TID Ian Corrales PA-C      Diclofenac Sodium  2 g Topical 4x Daily PRN Collette Smalls, PA-C      enoxaparin  40 mg Subcutaneous Daily Ryan Mash, [facility]      folic acid  7,687 mcg Oral Daily Ryan Mash, CRNP      hydrOXYzine HCL  50 mg Oral TID Molly Damico MD      lithium carbonate  300 mg Oral HS Ian Corrales PA-C      LORazepam  1 mg Intramuscular Q6H PRN Max 3/day Ryan Mash, [facility]      mirtazapine  7 5 mg Oral HS PRN Ramiro Rivera Davida, [facility]      morphine  15 mg Oral Q12H [address] Stana Luis Antonio, CRNP      nicotine polacrilex  2 mg Oral Q2H PRN Stana Luis Antonio, CRNP      OLANZapine  5 mg Intramuscular Q3H PRN Max 3/day Stana Luis Antonio, CRNP      OLANZapine  5 mg Oral Q3H PRN Max 3/day Stana Luis Antonio, CRNP      ondansetron  4 mg Oral Q6H PRN Stana Luis Antonio, CRNP      pantoprazole  20 mg Oral Early Morning Stana Luis Antonio, CRNP      PARoxetine  40 mg Oral Daily Ernestina Henson MD      polyethylene glycol  17 g Oral Daily PRN Shannon Montoya MD      potassium chloride  20 mEq Oral Daily Stana Luis Antonio, CRNP      prazosin  2 mg Oral Daily Stana Luis Antonio, CRNP      pregabalin  100 mg Oral BID Stana Luis Antonio, CRNP      propranolol  10 mg Oral BID before breakfast/lunch Ernestina Henson MD      QUEtiapine  50 mg Oral HS Maged Clark, [facility]      QUEtiapine  50 mg Oral BID Omega Nyhan, PA-C      senna-docusate sodium  1 tablet Oral Daily PRN Stana Luis Antonio, CRNP      Valbenazine Tosylate  80 mg Oral Daily [place] Luis Antonio, CRNP       Continuous Infusions:   PRN Meds: aluminum-magnesium hydroxide-simethicone    benztropine    benztropine    Diclofenac Sodium    LORazepam    mirtazapine    nicotine polacrilex    OLANZapine    OLANZapine    ondansetron    polyethylene glycol    senna-docusate sodium  Allergies:  No Known Allergies  Review of systems:  Past Medical History:   Diagnosis Date    Acid reflux     Anxiety     RESOLVED: [id number]    Arthritis     Bipolar 2 disorder (HCC)     FOLLOWS WITH PSYCHIATRIST   CONTINUE LAMOTRIGINE; RESOLVED: [id number]    Depression     Familial tremor     both hands    Fibromyalgia     LAST ASSESSED: [id number]    Hearing aid worn     left ear    [place] (hard of hearing)     left ear    Hypertension     Left-sided weakness     Lower back pain     Memory loss of unknown cause     long and short term    Migraine     Obesity     Obesity, Class II, BMI 35-39 9     Overactive bladder     Panic attack     Post traumatic stress disorder     Seasonal allergies     Stroke [facility])     questionable stroke 2009    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  CONT ASA 81 MG DAILY; RESOLVED: [id number]    Urinary incontinence     Wears dentures     partial lower / full upper    Wears glasses      Visit Vitals  /79 (BP Location: Right arm)   Pulse 65   Temp 97 7 °F (36 5 °C) (Skin)   Resp 16   Ht 5' 1" (1 549 m)   Wt 90 2 kg (198 lb 13 7 oz)   SpO2 100%   BMI 37 57 kg/m²   OB Status Hysterectomy   Smoking Status Never Smoker   BSA 1 88 m²     Mental Status Evaluation:  Appearance:  Appears of age, was using a wheelchair, wearing hospital gown  Behavior: Cooperative  Speech: Spontaneous, normal and gaol directed  Mood: " I am having a big panic attack"  Affect: Anxious  Language: Intact  Thought Process:  Normal   Thought Content:  Normal   Perceptual Disturbances: No hallucination hallucinations  Risk Potential: No Si or HI  Sensorium: Or iented X 3  Cognition: Clear, memory intact  Consciousness:  Alert and awake  Attention: Good  Intellect: Average  Fund of Knowledge: Normal   Insight:poor  Judgment:poor  Muscle Strength and Tone: Normal   Gait/Station: Normal   Motor Activity: Using a wheelchair  Lab Results: I have personally reviewed pertinent lab results  NOTE:  Total of  20  minutes were spent in talking to patient completing this medical record reviewing medical chart medical decision making    Joanna Abel MD

## 2021-08-01 PROCEDURE — 99232 SBSQ HOSP IP/OBS MODERATE 35: CPT | Performed by: PSYCHIATRY & NEUROLOGY

## 2021-08-01 RX ADMIN — MIRTAZAPINE 7.5 MG: 7.5 TABLET, FILM COATED ORAL at 23:45

## 2021-08-01 RX ADMIN — HYDROXYZINE HYDROCHLORIDE 50 MG: 50 TABLET, FILM COATED ORAL at 15:22

## 2021-08-01 RX ADMIN — MORPHINE SULFATE 15 MG: 15 TABLET, EXTENDED RELEASE ORAL at 08:38

## 2021-08-01 RX ADMIN — AMLODIPINE BESYLATE 5 MG: 5 TABLET ORAL at 08:40

## 2021-08-01 RX ADMIN — HYDROXYZINE HYDROCHLORIDE 50 MG: 50 TABLET, FILM COATED ORAL at 21:48

## 2021-08-01 RX ADMIN — ACETAMINOPHEN 975 MG: 325 TABLET ORAL at 13:26

## 2021-08-01 RX ADMIN — HYDROXYZINE HYDROCHLORIDE 50 MG: 50 TABLET, FILM COATED ORAL at 08:39

## 2021-08-01 RX ADMIN — ENOXAPARIN SODIUM 40 MG: 40 INJECTION SUBCUTANEOUS at 08:41

## 2021-08-01 RX ADMIN — ACETAMINOPHEN 975 MG: 325 TABLET ORAL at 06:16

## 2021-08-01 RX ADMIN — FOLIC ACID 1000 MCG: 1 TABLET ORAL at 08:39

## 2021-08-01 RX ADMIN — PHENYTOIN 2 MG: 125 SUSPENSION ORAL at 08:40

## 2021-08-01 RX ADMIN — PANTOPRAZOLE SODIUM 20 MG: 20 TABLET, DELAYED RELEASE ORAL at 06:15

## 2021-08-01 RX ADMIN — POTASSIUM CHLORIDE 20 MEQ: 20 TABLET, EXTENDED RELEASE ORAL at 08:39

## 2021-08-01 RX ADMIN — BUSPIRONE HYDROCHLORIDE 20 MG: 10 TABLET ORAL at 21:47

## 2021-08-01 RX ADMIN — BUSPIRONE HYDROCHLORIDE 20 MG: 10 TABLET ORAL at 15:22

## 2021-08-01 RX ADMIN — PREGABALIN 100 MG: 50 CAPSULE ORAL at 17:01

## 2021-08-01 RX ADMIN — QUETIAPINE FUMARATE 50 MG: 50 TABLET ORAL at 17:00

## 2021-08-01 RX ADMIN — ATORVASTATIN CALCIUM 40 MG: 40 TABLET, FILM COATED ORAL at 15:45

## 2021-08-01 RX ADMIN — QUETIAPINE FUMARATE 50 MG: 50 TABLET ORAL at 21:47

## 2021-08-01 RX ADMIN — MORPHINE SULFATE 15 MG: 15 TABLET, EXTENDED RELEASE ORAL at 21:47

## 2021-08-01 RX ADMIN — ASPIRIN 81 MG CHEWABLE TABLET 81 MG: 81 TABLET CHEWABLE at 08:39

## 2021-08-01 RX ADMIN — ACETAMINOPHEN 975 MG: 325 TABLET ORAL at 21:47

## 2021-08-01 RX ADMIN — PAROXETINE HYDROCHLORIDE 40 MG: 20 TABLET, FILM COATED ORAL at 08:39

## 2021-08-01 RX ADMIN — BUSPIRONE HYDROCHLORIDE 20 MG: 10 TABLET ORAL at 08:39

## 2021-08-01 RX ADMIN — LITHIUM CARBONATE 300 MG: 300 TABLET, EXTENDED RELEASE ORAL at 21:47

## 2021-08-01 RX ADMIN — VALBENAZINE 80 MG: 80 CAPSULE ORAL at 08:39

## 2021-08-01 RX ADMIN — QUETIAPINE FUMARATE 50 MG: 50 TABLET ORAL at 08:40

## 2021-08-01 RX ADMIN — PROPRANOLOL HYDROCHLORIDE 10 MG: 10 TABLET ORAL at 06:16

## 2021-08-01 RX ADMIN — PREGABALIN 100 MG: 50 CAPSULE ORAL at 08:39

## 2021-08-01 RX ADMIN — PROPRANOLOL HYDROCHLORIDE 10 MG: 10 TABLET ORAL at 12:29

## 2021-08-01 NOTE — PLAN OF CARE
Problem: Ineffective Coping  Goal: Participates in unit activities  Description: Interventions:  - Provide therapeutic environment   - Provide required programming   - Redirect inappropriate behaviors   Outcome: Progressing     Problem: BEHAVIOR  Goal: Pt/Family maintain appropriate behavior and adhere to behavioral management agreement, if implemented  Description: INTERVENTIONS:  - Assess the family dynamic   - Encourage verbalization of thoughts and concerns in a socially appropriate manner  - Assess patient/family's coping skills and non-compliant behavior (including use of illegal substances)  - Utilize positive, consistent limit setting strategies supporting safety of patient, staff and others  - Initiate consult with Case Management, Spiritual Care or other ancillary services as appropriate  - If a patient's/visitor's behavior jeopardizes the safety of the patient, staff, or others, refer to organization procedure     - Notify Security of behavior or suspected illegal substances which indicate the need for search of the patient and/or belongings  - Encourage participation in the decision making process about a behavioral management agreement; implement if patient meets criteria  Outcome: Progressing     Problem: ANXIETY  Goal: Will report anxiety at manageable levels  Description: INTERVENTIONS:  - Administer medication as ordered  - Teach and encourage coping skills  - Provide emotional support  - Assess patient/family for anxiety and ability to cope  Outcome: Progressing  Goal: By discharge: Patient will verbalize 2 strategies to deal with anxiety  Description: Interventions:  - Identify any obvious source/trigger to anxiety  - Staff will assist patient in applying identified coping technique/skills  - Encourage attendance of scheduled groups and activities  Outcome: Progressing

## 2021-08-01 NOTE — PLAN OF CARE
Problem: Alteration in Thoughts and Perception  Goal: Verbalize thoughts and feelings  Description: Interventions:  - Promote a nonjudgmental and trusting relationship with the patient through active listening and therapeutic communication  - Assess patient's level of functioning, behavior and potential for risk  - Engage patient in 1 on 1 interactions  - Encourage patient to express fears, feelings, frustrations, and discuss symptoms    - [place] patient to reality, help patient recognize reality-based thinking   - Administer medications as ordered and assess for potential side effects  - Provide the patient education related to the signs and symptoms of the illness and desired effects of prescribed medications  Outcome: Progressing     Problem: Alteration in Thoughts and Perception  Goal: Refrain from acting on delusional thinking/internal stimuli  Description: Interventions:  - Monitor patient closely, per order   - Utilize least restrictive measures   - Set reasonable limits, give positive feedback for acceptable   - Administer medications as ordered and monitor of potential side effects  Outcome: Progressing     Problem: Alteration in Thoughts and Perception  Goal: Agree to be compliant with medication regime, as prescribed and report medication side effects  Description: Interventions:  - Offer appropriate PRN medication and supervise ingestion; conduct AIMS, as needed   Outcome: Progressing

## 2021-08-01 NOTE — NURSING NOTE
Patient calm and cooperative on approach denies s/s  Patient in and out of common areas with good interaction with select peers  Patient complaint with medications in the shift

## 2021-08-01 NOTE — NURSING NOTE
Patient is medication and meal compliant  No complaints of pain or discomfort  Patient refused breakfast this morning but did well at lunch time eating 75% of same  Patient reports her depression at 7/10 and anxiety at 4/4  Patient did not request any PRN medications and reported that she had a panic attack in her room but "fought through it" and did not need any intervention for same  No manic behaviors noted  Patient is visible and social on the unit with select peers  No new issues or concerns noted at this time  Will continue to monitor patient for changes in mood or behavior

## 2021-08-01 NOTE — PROGRESS NOTES
Progress Notes- Behavioral Health         Assessment/Plan    Principal Problem:  Bipolar II disorder ([address] )     PLAN:   1) Continue current medications  Her medications are changed  This writer reviewed medications again today  Patient is complaining of anxiety and wants more medication but she is on a lot of medications  This writer will not change nay and will leave it up to her team tomorrow to make changes if possible  2) Continue Group and individual therapy  3) Discharge planning  4) discussed with staff  INTERVAL HISTORY:  Patient continue to be medication seeking  She was lying in bed with covers  She said she is very anxious and asked for more medications  She said her current medications are not helping  said she is having a panic attack  She was withdrawn and less talkative today  Staff reported that patient has compliant of anxiety thru out  She is eating but mostly staying in her bed    Scheduled Meds:  Scheduled Meds:  Current Facility-Administered Medications   Medication Dose Route Frequency Provider Last Rate    acetaminophen  975 mg Oral Q8H [facility] MERCY Coffman      aluminum-magnesium hydroxide-simethicone  30 mL Oral Q4H PRN MERCY Coffman      amLODIPine  5 mg Oral Daily Triny Callahan, CRNP      aspirin  81 mg Oral Daily Triny Callahan, BOBBYNP      atorvastatin  40 mg Oral Daily With Ruiz Jos, CRNP      benztropine  1 mg Intramuscular Q4H PRN Max 6/day Triny Callahan, CRERIBERTO      benztropine  0 5 mg Oral Q4H PRN Max 6/day Triny Callahan, MERCY      busPIRone  20 mg Oral TID Glenna Mancini PAEKATERINA      Diclofenac Sodium  2 g Topical 4x Daily PRN Kady Sneed PA-C      enoxaparin  40 mg Subcutaneous Daily MERCY Coffman      folic acid  0,667 mcg Oral Daily MERCY Coffman      hydrOXYzine HCL  50 mg Oral TID Ric Amos MD      lithium carbonate  300 mg Oral HS Glenna Mancini PAEKATERINA      LORazepam  1 mg Intramuscular Q6H PRN Max 3/day Triny Callahan CRNP      mirtazapine  7 5 mg Oral HS PRN Blondell Halon, CRNP      morphine  15 mg Oral Q12H [address] Blondell Halon, CRNP      nicotine polacrilex  2 mg Oral Q2H PRN Blondell Halon, CRNP      OLANZapine  5 mg Intramuscular Q3H PRN Max 3/day Blondell Halon, CRNP      OLANZapine  5 mg Oral Q3H PRN Max 3/day Blondell Halon, CRNP      ondansetron  4 mg Oral Q6H PRN Blondell Halon, CRNP      pantoprazole  20 mg Oral Early Morning Blondell Halon, CRNP      PARoxetine  40 mg Oral Daily Ji Moody MD      polyethylene glycol  17 g Oral Daily PRN Tabitha Meyer MD      potassium chloride  20 mEq Oral Daily Blondell Halon, CRNP      prazosin  2 mg Oral Daily Blondell Halon, CRNP      pregabalin  100 mg Oral BID Blondell Halon, CRNP      propranolol  10 mg Oral BID before breakfast/lunch Ji Moody MD      QUEtiapine  50 mg Oral HS Mega Evelyn, CRNP      QUEtiapine  50 mg Oral BID Estrellita Lawrence PA-C      senna-docusate sodium  1 tablet Oral Daily PRN Blondell Halon, CRNP      Valbenazine Tosylate  80 mg Oral Daily Blondell Halon, CRNP       Continuous Infusions:   PRN Meds: aluminum-magnesium hydroxide-simethicone    benztropine    benztropine    Diclofenac Sodium    LORazepam    mirtazapine    nicotine polacrilex    OLANZapine    OLANZapine    ondansetron    polyethylene glycol    senna-docusate sodium     Allergies:  No Known Allergies  Review of systems:    Medical History  Past Medical History:  Diagnosis Date   Acid reflux     Anxiety      RESOLVED: [id number]   Arthritis     Bipolar 2 disorder (HCC)      FOLLOWS WITH PSYCHIATRIST   CONTINUE LAMOTRIGINE; RESOLVED: [id number]   Depression     Familial tremor      both hands   Fibromyalgia      LAST ASSESSED: [id number]   Hearing aid worn      left ear   [place] (hard of hearing)      left ear   Hypertension     Left-sided weakness     Lower back pain     Memory loss of unknown cause      long and short term   Migraine     Obesity     Obesity, Class II, BMI 35-39 9     Overactive bladder     Panic attack     Post traumatic stress disorder     Seasonal allergies     Stroke 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  CONT ASA 81 MG DAILY; RESOLVED: [id number]   Urinary incontinence     Wears dentures      partial lower / full upper   Wears glasses         Visit Vitals  Visit Vitals  /70 (BP Location: Left arm)   Pulse 55   Temp (!) 97 4 °F (36 3 °C) (Temporal)   Resp 17   Ht 5' 1" (1 549 m)   Wt 90 2 kg (198 lb 13 7 oz)   SpO2 97%   BMI 37 57 kg/m²   OB Status Hysterectomy   Smoking Status Never Smoker   BSA 1 88 m²        Mental Status Evaluation:  Appearance:    Appears of age, lying in bed  Behavior: Cooperative, withdrawn  Speech: Spontaneous, normal and gaol directed  Mood: " I am anxious"  Affect:  Anxious, sad  Language: Intact  Thought Process:  Slowed  Thought Content:  Normal   Perceptual Disturbances: No hallucination hallucinations  Risk Potential: No Si or HI  Sensorium: Or iented X 3  Cognition: Clear, memory intact  Consciousness:  Alert and awake  Attention: Good  Intellect: Average  Fund of Knowledge: Normal   Insight: poor  Judgment: poor  Muscle Strength and Tone:    Normal   Gait/Station:    Normal   Motor Activity: Reduced  Lab Results: I have personally reviewed pertinent lab results  NOTE:  Total of  20  minutes were spent in talking to patient completing this medical record reviewing medical chart medical decision making    Tanmay Powell MD

## 2021-08-02 ENCOUNTER — PATIENT OUTREACH (OUTPATIENT)
Dept: INTERNAL MEDICINE CLINIC | Facility: CLINIC | Age: 56
End: 2021-08-02

## 2021-08-02 RX ORDER — ATORVASTATIN CALCIUM 40 MG/1
TABLET, FILM COATED ORAL
Qty: 30 TABLET | Refills: 0 | Status: SHIPPED | OUTPATIENT
Start: 2021-08-02 | End: 2021-09-28

## 2021-08-02 RX ORDER — LORATADINE 10 MG
TABLET ORAL
Qty: 30 TABLET | Refills: 0 | Status: SHIPPED | OUTPATIENT
Start: 2021-08-02 | End: 2021-12-06 | Stop reason: SDUPTHER

## 2021-08-02 RX ORDER — PANTOPRAZOLE SODIUM 20 MG/1
20 TABLET, DELAYED RELEASE ORAL
Qty: 30 TABLET | Refills: 0 | Status: SHIPPED | OUTPATIENT
Start: 2021-08-02 | End: 2022-02-17 | Stop reason: SDUPTHER

## 2021-08-02 RX ORDER — BUSPIRONE HYDROCHLORIDE 15 MG/1
TABLET ORAL
Qty: 90 TABLET | Refills: 0 | Status: SHIPPED | OUTPATIENT
Start: 2021-08-02 | End: 2021-08-12

## 2021-08-02 RX ORDER — QUETIAPINE FUMARATE 100 MG/1
TABLET, FILM COATED ORAL
Qty: 30 TABLET | Refills: 0 | Status: SHIPPED | OUTPATIENT
Start: 2021-08-02 | End: 2021-08-05 | Stop reason: HOSPADM

## 2021-08-02 RX ORDER — PAROXETINE HYDROCHLORIDE 20 MG/1
60 TABLET, FILM COATED ORAL DAILY
Status: DISCONTINUED | OUTPATIENT
Start: 2021-08-02 | End: 2021-08-05 | Stop reason: HOSPADM

## 2021-08-02 RX ORDER — PROPRANOLOL HYDROCHLORIDE 20 MG/1
20 TABLET ORAL
Status: DISCONTINUED | OUTPATIENT
Start: 2021-08-02 | End: 2021-08-05 | Stop reason: HOSPADM

## 2021-08-02 RX ADMIN — MORPHINE SULFATE 15 MG: 15 TABLET, EXTENDED RELEASE ORAL at 20:07

## 2021-08-02 RX ADMIN — PANTOPRAZOLE SODIUM 20 MG: 20 TABLET, DELAYED RELEASE ORAL at 06:28

## 2021-08-02 RX ADMIN — PROPRANOLOL HYDROCHLORIDE 10 MG: 10 TABLET ORAL at 06:28

## 2021-08-02 RX ADMIN — HYDROXYZINE HYDROCHLORIDE 50 MG: 50 TABLET, FILM COATED ORAL at 20:06

## 2021-08-02 RX ADMIN — PREGABALIN 100 MG: 50 CAPSULE ORAL at 17:07

## 2021-08-02 RX ADMIN — HYDROXYZINE HYDROCHLORIDE 50 MG: 50 TABLET, FILM COATED ORAL at 15:09

## 2021-08-02 RX ADMIN — QUETIAPINE 75 MG: 50 TABLET ORAL at 21:06

## 2021-08-02 RX ADMIN — PHENYTOIN 2 MG: 125 SUSPENSION ORAL at 08:26

## 2021-08-02 RX ADMIN — HYDROXYZINE HYDROCHLORIDE 50 MG: 50 TABLET, FILM COATED ORAL at 08:26

## 2021-08-02 RX ADMIN — PROPRANOLOL HYDROCHLORIDE 20 MG: 20 TABLET ORAL at 11:53

## 2021-08-02 RX ADMIN — PAROXETINE HYDROCHLORIDE 60 MG: 20 TABLET, FILM COATED ORAL at 08:27

## 2021-08-02 RX ADMIN — ACETAMINOPHEN 975 MG: 325 TABLET ORAL at 13:22

## 2021-08-02 RX ADMIN — BUSPIRONE HYDROCHLORIDE 20 MG: 10 TABLET ORAL at 20:07

## 2021-08-02 RX ADMIN — ENOXAPARIN SODIUM 40 MG: 40 INJECTION SUBCUTANEOUS at 08:29

## 2021-08-02 RX ADMIN — BUSPIRONE HYDROCHLORIDE 20 MG: 10 TABLET ORAL at 15:09

## 2021-08-02 RX ADMIN — POLYETHYLENE GLYCOL 3350 17 G: 17 POWDER, FOR SOLUTION ORAL at 08:30

## 2021-08-02 RX ADMIN — MORPHINE SULFATE 15 MG: 15 TABLET, EXTENDED RELEASE ORAL at 08:25

## 2021-08-02 RX ADMIN — QUETIAPINE 75 MG: 50 TABLET ORAL at 08:26

## 2021-08-02 RX ADMIN — VALBENAZINE 80 MG: 80 CAPSULE ORAL at 08:27

## 2021-08-02 RX ADMIN — AMLODIPINE BESYLATE 5 MG: 5 TABLET ORAL at 08:26

## 2021-08-02 RX ADMIN — FOLIC ACID 1000 MCG: 1 TABLET ORAL at 08:25

## 2021-08-02 RX ADMIN — QUETIAPINE 75 MG: 50 TABLET ORAL at 17:07

## 2021-08-02 RX ADMIN — ACETAMINOPHEN 975 MG: 325 TABLET ORAL at 21:07

## 2021-08-02 RX ADMIN — BUSPIRONE HYDROCHLORIDE 20 MG: 10 TABLET ORAL at 08:29

## 2021-08-02 RX ADMIN — PREGABALIN 100 MG: 50 CAPSULE ORAL at 08:26

## 2021-08-02 RX ADMIN — ACETAMINOPHEN 975 MG: 325 TABLET ORAL at 06:28

## 2021-08-02 RX ADMIN — LITHIUM CARBONATE 300 MG: 300 TABLET, EXTENDED RELEASE ORAL at 21:06

## 2021-08-02 RX ADMIN — ASPIRIN 81 MG CHEWABLE TABLET 81 MG: 81 TABLET CHEWABLE at 08:25

## 2021-08-02 RX ADMIN — ATORVASTATIN CALCIUM 40 MG: 40 TABLET, FILM COATED ORAL at 15:30

## 2021-08-02 RX ADMIN — POTASSIUM CHLORIDE 20 MEQ: 20 TABLET, EXTENDED RELEASE ORAL at 08:26

## 2021-08-02 NOTE — PROGRESS NOTES
Progress Note - Behavioral Health   Samantha QUINONEZ Erick Hernandez 60 y o  female MRN: [id number]  Unit/Bed#: [facility] Encounter: [id number]    Assessment/Plan   Principal Problem:    Bipolar II disorder ([address] )  Active Problems:    Chronic bilateral low back pain with bilateral sciatica    Chronic pain syndrome    Essential hypertension    History of hypokalemia    Opioid dependence (HCC)    Panic disorder without agoraphobia    Post traumatic stress disorder    Tardive dyskinesia    Primary osteoarthritis of both knees    Multiple closed fractures of metatarsal bone of right foot    History of CVA (cerebrovascular accident)    Medical clearance for psychiatric admission    Mixed hyperlipidemia    Leukopenia  Patient reports similar complaints about being panicky and anxious and having poor sleep and being tremulous  She has been asking for medication on and off and seems to be still medication seeking  Also seems to be attention seeking  She reports not feeling much better since admission and is difficult to seeing any significant progress  It appears the patient has a lot of investment in to being ill and that having staff  tend to her      Behavior over the last 24 hours:  unchanged  Sleep: insomnia  Appetite: normal  Medication side effects: No  ROS: no complaints    Mental Status Evaluation:  Appearance:  age appropriate   Behavior:  guarded   Speech:  normal pitch and normal volume   Mood:  anxious and depressed   Affect:  blunted and constricted   Thought Process:  tangential   Thought Content:  normal   Perceptual Disturbances: None   Risk Potential: Suicidal Ideations none  Homicidal Ideations none  Potential for Aggression No   Sensorium:  person, place, and situation   Memory:  recent and remote memory grossly intact   Consciousness:  alert and awake    Attention: attention span appeared shorter than expected for age   Insight:  fair   Judgment: fair   Gait/Station: normal gait/station   Motor Activity: no abnormal movements     Progress Toward Goals:  None much progress    Recommended Treatment: Continue with group therapy, milieu therapy and occupational therapy  Risks, benefits and possible side effects of Medications:   Risks, benefits, and possible side effects of medications explained to patient and patient verbalizes understanding  Medications: planned medication changes: Increase Seroquel to 75 mg and propranolol to 20 mg  Labs: I have personally reviewed all pertinent laboratory/tests results  Most Recent Labs:   Lab Results   Component Value Date    WBC 5 60 07/23/2021    RBC 4 03 07/23/2021    HGB 12 2 07/23/2021    HCT 36 3 07/23/2021     07/23/2021    RDW 14 8 07/23/2021    NEUTROABS 3 40 07/23/2021    SODIUM 140 07/17/2021    K 4 0 07/17/2021     07/17/2021    CO2 25 07/17/2021    BUN 11 07/17/2021    CREATININE 0 63 07/17/2021    GLUC 96 07/17/2021    GLUF 96 07/17/2021    CALCIUM 8 9 07/17/2021    AST 12 07/11/2021    ALT 18 07/11/2021    ALKPHOS 104 07/11/2021    TP 7 8 07/11/2021    ALB 3 7 07/11/2021    TBILI 0 67 07/11/2021    CHOLESTEROL 215 (H) 02/13/2020    HDL 84 02/13/2020    TRIG 74 02/13/2020    LDLCALC 116 (H) 02/13/2020    NONHDLC 131 02/13/2020    AMMONIA <10 (L) 06/27/2021    [id number] 2 060 06/30/2021    FREET4 0 80 02/13/2020    PREGSERUM Negative 02/22/2014    RPR Non-Reactive 06/29/2021    HGBA1C 4 8 02/08/2021    EAG 91 02/08/2021       Counseling / Coordination of Care  Total floor / unit time spent today 25 minutes  Greater than 50% of total time was spent with the patient and / or family counseling and / or coordination of care   A description of the counseling / coordination of care:

## 2021-08-02 NOTE — PLAN OF CARE
Pt  Did attend 1 of 2 groups yet displayed superficial interest in the topic of personal recovery goal setting    Problem: Ineffective Coping  Goal: Participates in unit activities  Description: Interventions:  - Provide therapeutic environment   - Provide required programming   - Redirect inappropriate behaviors   Outcome: Progressing

## 2021-08-02 NOTE — CMS CERTIFICATION NOTE
Certification: Based upon physical, mental and social evaluations, I certify that inpatient psychiatric services are medically necessary for this patient for a duration of 29 midnights for the treatment of depression, anxiety  Available alternative community resources do not meet the patient's mental health care needs  I further attest that an established written individualized plan of care has been implemented and is outlined in the patient's medical records

## 2021-08-02 NOTE — NURSING NOTE
Patient is medication and meal compliant  No complaints of pain or discomfort  Patient is visible and social with select peers  No panic attacks reported but patient remains fearful of same  Patient advised of medication increases ordered by Dr Gricel Pope with increases in Risperdal, Paxil and Propanolol  Patient also educated on several coping skills such as deep breathing, imagery techniques, and using happy memories and as a way to distract her from her anxiety  Patient understood all techniques and agreed to try to use them when feeling anxious  Patient reports her depression at 7/10 and anxiety at 4/4  Patient did attend group this morning  No new concerns noted  Will continue to monitor patient for changes in mood or behavior

## 2021-08-02 NOTE — PROGRESS NOTES
08/02/21 0838   Team Meeting   Meeting Type Daily Rounds   Team Members Present   Team Members Present Physician;Nurse;   Physician Team Member Gross   Nursing Team Member [facility] Management Team Member Augustine   Patient/Family Present   Patient Present No   Patient's Family Present No     Patient is focused on her medications  She requires redirection  Staff has utilized seclusion room because of patient's behaviors  Medication changes

## 2021-08-02 NOTE — PROGRESS NOTES
Outpatient Care Management Note:    Chart review completed  Patient currently inpatient at [facility]  Will plan to outreach once discharged

## 2021-08-03 LAB — LITHIUM SERPL-SCNC: 0.5 MMOL/L (ref 0.6–1.2)

## 2021-08-03 PROCEDURE — 80178 ASSAY OF LITHIUM: CPT | Performed by: PHYSICIAN ASSISTANT

## 2021-08-03 RX ADMIN — DOCUSATE SODIUM 50 MG AND SENNOSIDES 8.6 MG 1 TABLET: 8.6; 5 TABLET, FILM COATED ORAL at 17:10

## 2021-08-03 RX ADMIN — ATORVASTATIN CALCIUM 40 MG: 40 TABLET, FILM COATED ORAL at 17:11

## 2021-08-03 RX ADMIN — MORPHINE SULFATE 15 MG: 15 TABLET, EXTENDED RELEASE ORAL at 09:12

## 2021-08-03 RX ADMIN — PANTOPRAZOLE SODIUM 20 MG: 20 TABLET, DELAYED RELEASE ORAL at 06:08

## 2021-08-03 RX ADMIN — AMLODIPINE BESYLATE 5 MG: 5 TABLET ORAL at 09:13

## 2021-08-03 RX ADMIN — POTASSIUM CHLORIDE 20 MEQ: 20 TABLET, EXTENDED RELEASE ORAL at 09:11

## 2021-08-03 RX ADMIN — ASPIRIN 81 MG CHEWABLE TABLET 81 MG: 81 TABLET CHEWABLE at 09:13

## 2021-08-03 RX ADMIN — BUSPIRONE HYDROCHLORIDE 20 MG: 10 TABLET ORAL at 17:10

## 2021-08-03 RX ADMIN — HYDROXYZINE HYDROCHLORIDE 50 MG: 50 TABLET, FILM COATED ORAL at 17:10

## 2021-08-03 RX ADMIN — PAROXETINE HYDROCHLORIDE 60 MG: 20 TABLET, FILM COATED ORAL at 09:11

## 2021-08-03 RX ADMIN — ACETAMINOPHEN 975 MG: 325 TABLET ORAL at 13:31

## 2021-08-03 RX ADMIN — FOLIC ACID 1000 MCG: 1 TABLET ORAL at 09:13

## 2021-08-03 RX ADMIN — PROPRANOLOL HYDROCHLORIDE 20 MG: 20 TABLET ORAL at 06:08

## 2021-08-03 RX ADMIN — BUSPIRONE HYDROCHLORIDE 20 MG: 10 TABLET ORAL at 21:09

## 2021-08-03 RX ADMIN — LITHIUM CARBONATE 300 MG: 300 TABLET, EXTENDED RELEASE ORAL at 21:09

## 2021-08-03 RX ADMIN — ACETAMINOPHEN 975 MG: 325 TABLET ORAL at 06:08

## 2021-08-03 RX ADMIN — ACETAMINOPHEN 975 MG: 325 TABLET ORAL at 21:11

## 2021-08-03 RX ADMIN — PREGABALIN 100 MG: 50 CAPSULE ORAL at 09:11

## 2021-08-03 RX ADMIN — PHENYTOIN 2 MG: 125 SUSPENSION ORAL at 09:12

## 2021-08-03 RX ADMIN — HYDROXYZINE HYDROCHLORIDE 50 MG: 50 TABLET, FILM COATED ORAL at 09:13

## 2021-08-03 RX ADMIN — MIRTAZAPINE 7.5 MG: 7.5 TABLET, FILM COATED ORAL at 22:27

## 2021-08-03 RX ADMIN — BUSPIRONE HYDROCHLORIDE 20 MG: 10 TABLET ORAL at 09:12

## 2021-08-03 RX ADMIN — PROPRANOLOL HYDROCHLORIDE 20 MG: 20 TABLET ORAL at 11:59

## 2021-08-03 RX ADMIN — QUETIAPINE 75 MG: 50 TABLET ORAL at 21:10

## 2021-08-03 RX ADMIN — MORPHINE SULFATE 15 MG: 15 TABLET, EXTENDED RELEASE ORAL at 21:09

## 2021-08-03 RX ADMIN — PREGABALIN 100 MG: 50 CAPSULE ORAL at 17:11

## 2021-08-03 RX ADMIN — VALBENAZINE 80 MG: 80 CAPSULE ORAL at 09:11

## 2021-08-03 RX ADMIN — HYDROXYZINE HYDROCHLORIDE 50 MG: 50 TABLET, FILM COATED ORAL at 21:11

## 2021-08-03 RX ADMIN — QUETIAPINE 75 MG: 50 TABLET ORAL at 09:11

## 2021-08-03 RX ADMIN — QUETIAPINE 75 MG: 50 TABLET ORAL at 17:10

## 2021-08-03 NOTE — PROGRESS NOTES
Pt did not attend afternoon group  Pt was sitting with female peer in dayroom        08/03/21 1330   Activity/Group Checklist   Group Other (Comment)  (positive reflection: self esteem)   Attendance Did not attend

## 2021-08-03 NOTE — NURSING NOTE
Pt c/o anxiety but able to use deep breathing to cope  Refused breakfast, fair appetite at lunch  VSS  Interactive with female peer  Independent with wc transfers  Did not attend group  Monitored for safety and support

## 2021-08-03 NOTE — NURSING NOTE
Pt is calm and cooperative  Pt is med and meal compliant  Pt complains about feeling constipated, senekot given at 1710  Awaiting effectiveness  Pt reports 4/4 anxiety and 7/10 depression  Pt shows no further signs of distress  Will continue to monitor pt frequently

## 2021-08-03 NOTE — PROGRESS NOTES
Progress Note - Behavioral Health   Samantha Rosales 60 y o  female MRN: [id number]  Unit/Bed#: [facility] 147-01 Encounter: [id number]    Assessment/Plan   Principal Problem:    Bipolar II disorder ([address] )  Active Problems:    Chronic bilateral low back pain with bilateral sciatica    Chronic pain syndrome    Essential hypertension    History of hypokalemia    Opioid dependence (HCC)    Panic disorder without agoraphobia    Post traumatic stress disorder    Tardive dyskinesia    Primary osteoarthritis of both knees    Multiple closed fractures of metatarsal bone of right foot    History of CVA (cerebrovascular accident)    Medical clearance for psychiatric admission    Mixed hyperlipidemia    Leukopenia  Patient continues to be preoccupied anxiety, complaints of panic, and fearfulness and depression  A continues also to be medication and attention seeking  Had a discussion this morning about the her need to accept the some of her ongoing mental health issues which will never be 100% and the approved and she needs to focus on other things other than how she is feeling her house she is not feeling well  Also talked about her frequent trips to the emergency room which need to be curtailed was when she is discharged  Patient is tolerating the increased doses of both Seroquel propanolol and no issues in this regard  Lithium level 0 5  She is tolerating it well thus far      Behavior over the last 24 hours:  unchanged  Sleep: normal  Appetite: normal  Medication side effects: No  ROS: no complaints    Mental Status Evaluation:  Appearance:  age appropriate   Behavior:  guarded   Speech:  normal pitch and normal volume   Mood:  anxious and depressed   Affect:  blunted and constricted   Thought Process:  tangential   Thought Content:  normal   Perceptual Disturbances: None   Risk Potential: Suicidal Ideations none  Homicidal Ideations none  Potential for Aggression No   Sensorium:  person, place, and situation Memory:  recent and remote memory grossly intact   Consciousness:  alert and awake    Attention: attention span appeared shorter than expected for age   Insight:  fair   Judgment: fair   Gait/Station: normal gait/station   Motor Activity: no abnormal movements     Progress Toward Goals:  Little change    Recommended Treatment: Continue with group therapy, milieu therapy and occupational therapy  Risks, benefits and possible side effects of Medications:   Risks, benefits, and possible side effects of medications explained to patient and patient verbalizes understanding  Medications: all current active meds have been reviewed  Labs: I have personally reviewed all pertinent laboratory/tests results  Most Recent Labs:   Lab Results   Component Value Date    WBC 5 60 07/23/2021    RBC 4 03 07/23/2021    HGB 12 2 07/23/2021    HCT 36 3 07/23/2021     07/23/2021    RDW 14 8 07/23/2021    NEUTROABS 3 40 07/23/2021    SODIUM 140 07/17/2021    K 4 0 07/17/2021     07/17/2021    CO2 25 07/17/2021    BUN 11 07/17/2021    CREATININE 0 63 07/17/2021    GLUC 96 07/17/2021    GLUF 96 07/17/2021    CALCIUM 8 9 07/17/2021    AST 12 07/11/2021    ALT 18 07/11/2021    ALKPHOS 104 07/11/2021    TP 7 8 07/11/2021    ALB 3 7 07/11/2021    TBILI 0 67 07/11/2021    CHOLESTEROL 215 (H) 02/13/2020    HDL 84 02/13/2020    TRIG 74 02/13/2020    LDLCALC 116 (H) 02/13/2020    NONHDLC 131 02/13/2020    LITHIUM 0 5 (L) 08/03/2021    AMMONIA <10 (L) 06/27/2021    [id number] 2 060 06/30/2021    FREET4 0 80 02/13/2020    PREGSERUM Negative 02/22/2014    RPR Non-Reactive 06/29/2021    HGBA1C 4 8 02/08/2021    EAG 91 02/08/2021       Counseling / Coordination of Care  Total floor / unit time spent today 25 minutes  Greater than 50% of total time was spent with the patient and / or family counseling and / or coordination of care   A description of the counseling / coordination of care:

## 2021-08-03 NOTE — PROGRESS NOTES
08/03/21 0837   Team Meeting   Meeting Type Daily Rounds   Team Members Present   Team Members Present Physician;Nurse;;Occupational Therapist   Physician Team Member Gross   Nursing Team Member [id number] Management Team Member Guillermo MERCADO Team Member Viola   Patient/Family Present   Patient Present No   Patient's Family Present No     No changes with behaviors  She is medication focused  Medication changes  Slept  Medication compliant

## 2021-08-03 NOTE — PLAN OF CARE
Problem: Alteration in Thoughts and Perception  Goal: Treatment Goal: Gain control of psychotic behaviors/thinking, reduce/eliminate presenting symptoms and demonstrate improved reality functioning upon discharge  Outcome: Progressing  Goal: Verbalize thoughts and feelings  Description: Interventions:  - Promote a nonjudgmental and trusting relationship with the patient through active listening and therapeutic communication  - Assess patient's level of functioning, behavior and potential for risk  - Engage patient in 1 on 1 interactions  - Encourage patient to express fears, feelings, frustrations, and discuss symptoms    - [place] patient to reality, help patient recognize reality-based thinking   - Administer medications as ordered and assess for potential side effects  - Provide the patient education related to the signs and symptoms of the illness and desired effects of prescribed medications  Outcome: Progressing  Goal: Refrain from acting on delusional thinking/internal stimuli  Description: Interventions:  - Monitor patient closely, per order   - Utilize least restrictive measures   - Set reasonable limits, give positive feedback for acceptable   - Administer medications as ordered and monitor of potential side effects  Outcome: Progressing  Goal: Agree to be compliant with medication regime, as prescribed and report medication side effects  Description: Interventions:  - Offer appropriate PRN medication and supervise ingestion; conduct AIMS, as needed   Outcome: Progressing  Goal: Recognize dysfunctional thoughts, communicate reality-based thoughts at the time of discharge  Description: Interventions:  - Provide medication and psycho-education to assist patient in compliance and developing insight into his/her illness   Outcome: Progressing  Goal: Complete daily ADLs, including personal hygiene independently, as able  Description: Interventions:  - Observe, teach, and assist patient with ADLS  - Monitor and promote a balance of rest/activity, with adequate nutrition and elimination   Outcome: Progressing     Problem: Ineffective Coping  Goal: Identifies ineffective coping skills  Outcome: Progressing  Goal: Identifies healthy coping skills  Outcome: Progressing  Goal: Demonstrates healthy coping skills  Outcome: Progressing  Goal: Patient/Family participate in treatment and DC plans  Description: Interventions:  - Provide therapeutic environment  Outcome: Progressing  Goal: Patient/Family verbalizes awareness of resources  Outcome: Progressing     Problem: Anxiety  Goal: Anxiety is at manageable level  Description: Interventions:  - Assess and monitor patient's anxiety level  - Monitor for signs and symptoms (heart palpitations, chest pain, shortness of breath, headaches, nausea, feeling jumpy, restlessness, irritable, apprehensive)  - Collaborate with interdisciplinary team and initiate plan and interventions as ordered  - [place] patient to unit/surroundings  - Explain treatment plan  - Encourage participation in care  - Encourage verbalization of concerns/fears  - Identify coping mechanisms  - Assist in developing anxiety-reducing skills  - Administer/offer alternative therapies  - Limit or eliminate stimulants  Outcome: Progressing     Problem: BEHAVIOR  Goal: Pt/Family maintain appropriate behavior and adhere to behavioral management agreement, if implemented  Description: INTERVENTIONS:  - Assess the family dynamic   - Encourage verbalization of thoughts and concerns in a socially appropriate manner  - Assess patient/family's coping skills and non-compliant behavior (including use of illegal substances)    - Utilize positive, consistent limit setting strategies supporting safety of patient, staff and others  - Initiate consult with Case Management, Spiritual Care or other ancillary services as appropriate  - If a patient's/visitor's behavior jeopardizes the safety of the patient, staff, or others, refer to organization procedure     - Notify Security of behavior or suspected illegal substances which indicate the need for search of the patient and/or belongings  - Encourage participation in the decision making process about a behavioral management agreement; implement if patient meets criteria  Outcome: Progressing     Problem: ANXIETY  Goal: Will report anxiety at manageable levels  Description: INTERVENTIONS:  - Administer medication as ordered  - Teach and encourage coping skills  - Provide emotional support  - Assess patient/family for anxiety and ability to cope  Outcome: Progressing  Goal: By discharge: Patient will verbalize 2 strategies to deal with anxiety  Description: Interventions:  - Identify any obvious source/trigger to anxiety  - Staff will assist patient in applying identified coping technique/skills  - Encourage attendance of scheduled groups and activities  Outcome: Progressing     Problem: PAIN - ADULT  Goal: Verbalizes/displays adequate comfort level or baseline comfort level  Description: Interventions:  - Encourage patient to monitor pain and request assistance  - Assess pain using appropriate pain scale  - Administer analgesics based on type and severity of pain and evaluate response  - Implement non-pharmacological measures as appropriate and evaluate response  - Consider cultural and social influences on pain and pain management  - Notify physician/advanced practitioner if interventions unsuccessful or patient reports new pain  Outcome: Progressing     Problem: Alteration in Thoughts and Perception  Goal: Attend and participate in unit activities, including therapeutic, recreational, and educational groups  Description: Interventions:  -Encourage Visitation and family involvement in care  Outcome: Not Progressing     Problem: Ineffective Coping  Goal: Participates in unit activities  Description: Interventions:  - Provide therapeutic environment   - Provide required programming   - Redirect inappropriate behaviors   Outcome: Not Progressing

## 2021-08-04 RX ORDER — BUSPIRONE HYDROCHLORIDE 15 MG/1
15 TABLET ORAL 3 TIMES DAILY
Status: DISCONTINUED | OUTPATIENT
Start: 2021-08-04 | End: 2021-08-05 | Stop reason: HOSPADM

## 2021-08-04 RX ADMIN — AMLODIPINE BESYLATE 5 MG: 5 TABLET ORAL at 09:36

## 2021-08-04 RX ADMIN — PROPRANOLOL HYDROCHLORIDE 20 MG: 20 TABLET ORAL at 06:18

## 2021-08-04 RX ADMIN — BUSPIRONE HYDROCHLORIDE 15 MG: 15 TABLET ORAL at 21:20

## 2021-08-04 RX ADMIN — MIRTAZAPINE 7.5 MG: 7.5 TABLET, FILM COATED ORAL at 22:26

## 2021-08-04 RX ADMIN — PREGABALIN 100 MG: 50 CAPSULE ORAL at 16:52

## 2021-08-04 RX ADMIN — POLYETHYLENE GLYCOL 3350 17 G: 17 POWDER, FOR SOLUTION ORAL at 05:39

## 2021-08-04 RX ADMIN — ASPIRIN 81 MG CHEWABLE TABLET 81 MG: 81 TABLET CHEWABLE at 09:36

## 2021-08-04 RX ADMIN — BUSPIRONE HYDROCHLORIDE 15 MG: 15 TABLET ORAL at 09:36

## 2021-08-04 RX ADMIN — POTASSIUM CHLORIDE 20 MEQ: 20 TABLET, EXTENDED RELEASE ORAL at 09:36

## 2021-08-04 RX ADMIN — HYDROXYZINE HYDROCHLORIDE 50 MG: 50 TABLET, FILM COATED ORAL at 21:19

## 2021-08-04 RX ADMIN — LITHIUM CARBONATE 300 MG: 300 TABLET, EXTENDED RELEASE ORAL at 21:20

## 2021-08-04 RX ADMIN — ACETAMINOPHEN 975 MG: 325 TABLET ORAL at 05:39

## 2021-08-04 RX ADMIN — ACETAMINOPHEN 975 MG: 325 TABLET ORAL at 14:12

## 2021-08-04 RX ADMIN — VALBENAZINE 80 MG: 80 CAPSULE ORAL at 09:35

## 2021-08-04 RX ADMIN — BUSPIRONE HYDROCHLORIDE 15 MG: 15 TABLET ORAL at 16:52

## 2021-08-04 RX ADMIN — PROPRANOLOL HYDROCHLORIDE 20 MG: 20 TABLET ORAL at 11:54

## 2021-08-04 RX ADMIN — QUETIAPINE 75 MG: 50 TABLET ORAL at 16:51

## 2021-08-04 RX ADMIN — MORPHINE SULFATE 15 MG: 15 TABLET, EXTENDED RELEASE ORAL at 21:20

## 2021-08-04 RX ADMIN — ATORVASTATIN CALCIUM 40 MG: 40 TABLET, FILM COATED ORAL at 16:52

## 2021-08-04 RX ADMIN — MORPHINE SULFATE 15 MG: 15 TABLET, EXTENDED RELEASE ORAL at 09:36

## 2021-08-04 RX ADMIN — PANTOPRAZOLE SODIUM 20 MG: 20 TABLET, DELAYED RELEASE ORAL at 05:39

## 2021-08-04 RX ADMIN — HYDROXYZINE HYDROCHLORIDE 50 MG: 50 TABLET, FILM COATED ORAL at 16:52

## 2021-08-04 RX ADMIN — HYDROXYZINE HYDROCHLORIDE 50 MG: 50 TABLET, FILM COATED ORAL at 09:36

## 2021-08-04 RX ADMIN — QUETIAPINE 75 MG: 50 TABLET ORAL at 21:20

## 2021-08-04 RX ADMIN — FOLIC ACID 1000 MCG: 1 TABLET ORAL at 09:36

## 2021-08-04 RX ADMIN — PAROXETINE HYDROCHLORIDE 60 MG: 20 TABLET, FILM COATED ORAL at 09:36

## 2021-08-04 RX ADMIN — ACETAMINOPHEN 975 MG: 325 TABLET ORAL at 21:18

## 2021-08-04 RX ADMIN — PHENYTOIN 2 MG: 125 SUSPENSION ORAL at 09:36

## 2021-08-04 RX ADMIN — QUETIAPINE 75 MG: 50 TABLET ORAL at 09:36

## 2021-08-04 RX ADMIN — PREGABALIN 100 MG: 50 CAPSULE ORAL at 09:36

## 2021-08-04 NOTE — DISCHARGE INSTR - OTHER ORDERS
Patient is being discharged, no SI/HI, no psychosis or A/V  Patient is in agreement with discharge  No questions verbalized  Patient provided with after care appointment, discharge instructions and prescriptions  IMM, quality core measures, transition of care, discharge instructions, and treatment plan complete  Patient will be transported by   Hilbert Gilford will continue to follow up as needed  Patient's discharge was faxed to outpatient providers  [facility], [address] [facility] [address] [facility], [facility]  [phone]  Financial Help Provided: SNAP, [address], Food [place], Welfare Office, [facility]  Please visit this office to complete your application for [address] is a toll-free telephone number for people in [facility] who are seeking a listening ear for additional support in their recovery from mental illness  The PEER LINE is peer-run and peer-friendly  Callers to the [address] will speak directly to other individuals who have experienced the mental health recovery process  The PEER LINE staff possess these three core values to assist and support the PEER LINE caller:  Acceptance   [facility]  Promote individual recovery and strengthen communities  Funding Information  This project is funded, in part, under contract with [facility], through funds provided by the [employer]  [facility] has always promoted, used, and remains faithful to procedure and language that reflects recovery-based and person-first principles  [facility]'s [employer]   You can call the Peer Line 24 hours a day     Phone: 1-304-VK-PEERS  (8-392.565.9213)     Saiad Chong RN, our Behavioral Health Nurse Navigator, will be calling you after your discharge, on the phone number that you provided  She will be available as an additional support, if needed  If you wish to speak with her, you may contact Jose Adames at [phone]

## 2021-08-04 NOTE — TREATMENT TEAM
Notified of patient starting KCl on 7/18 with no K level since       08/04/21 1300   Service   Service SLIM   Provider Name Josh JHAVERI   Multi-disciplinary Rounds   Pending Pathology and Pertinent Tests Most recent lab data reviewed

## 2021-08-04 NOTE — PROGRESS NOTES
08/04/21 0837   Team Meeting   Meeting Type Daily Rounds   Team Members Present   Team Members Present Physician;Nurse;;   Physician Team Member Gross   Nursing Team Member [facility] Management Team Member Guillermo   Social Work Team Member Joslyn Valera   Patient/Family Present   Patient Present No   Patient's Family Present No     Patient is visible and social  She requires less redirection   D/C Thurs

## 2021-08-04 NOTE — NURSING NOTE
Patient is pleasant and cooperative with the care  Was able to connect with her family and talk about tomorrows discharge  Patient seemed calm and didn't complain about anxiety

## 2021-08-04 NOTE — PLAN OF CARE
Problem: Alteration in Thoughts and Perception  Goal: Verbalize thoughts and feelings  Description: Interventions:  - Promote a nonjudgmental and trusting relationship with the patient through active listening and therapeutic communication  - Assess patient's level of functioning, behavior and potential for risk  - Engage patient in 1 on 1 interactions  - Encourage patient to express fears, feelings, frustrations, and discuss symptoms    - [place] patient to reality, help patient recognize reality-based thinking   - Administer medications as ordered and assess for potential side effects  - Provide the patient education related to the signs and symptoms of the illness and desired effects of prescribed medications  Outcome: Progressing  Goal: Attend and participate in unit activities, including therapeutic, recreational, and educational groups  Description: Interventions:  -Encourage Visitation and family involvement in care  Outcome: Progressing

## 2021-08-04 NOTE — PROGRESS NOTES
Pt was able to transfer self to a stationary chair  just prior to sessions start, without being disruptive to the group and appropriately inquired on stress reduction breathing techniques to deal with anxiety  Pt  well focused to task involvement and remained for the full session with active participation  08/04/21 1100   Activity/Group Checklist   Group Exercise  (seated musical exercises/breathing techniqes)   Attendance Attended   Attendance Duration (min) 31-45   Interactions Interacted appropriately  (pt was advised in ways to calm self through breathing )   Affect/Mood Normal range; Appropriate   Goals Achieved Able to engage in interactions; Discussed coping strategies; Able to listen to others

## 2021-08-04 NOTE — PROGRESS NOTES
Progress Note - Behavioral Health   Samantha Gould 60 y o  female MRN: [id number]  Unit/Bed#: [facility] 600-79 Encounter: [id number]    Assessment/Plan   Principal Problem:    Bipolar II disorder ([address] )  Active Problems:    Chronic bilateral low back pain with bilateral sciatica    Chronic pain syndrome    Essential hypertension    History of hypokalemia    Opioid dependence (HCC)    Panic disorder without agoraphobia    Post traumatic stress disorder    Tardive dyskinesia    Primary osteoarthritis of both knees    Multiple closed fractures of metatarsal bone of right foot    History of CVA (cerebrovascular accident)    Medical clearance for psychiatric admission    Mixed hyperlipidemia    Leukopenia  Patient had a better day yesterday with last the anxiety and panic complaints and more appropriate behavior with the staff  She seems a understand better about the her illness and need to find different ways to cope with her problems other than looking for new or extra medication  She talked about her readiness to a planned discharge at this time and hopes that she in the going to therapy and find a way to cope better      Behavior over the last 24 hours:  improved  Sleep: normal  Appetite: normal  Medication side effects: No  ROS: no complaints    Mental Status Evaluation:  Appearance:  age appropriate   Behavior:  guarded   Speech:  normal pitch and normal volume   Mood:  anxious and depressed   Affect:  blunted and constricted   Thought Process:  tangential   Thought Content:  normal   Perceptual Disturbances: None   Risk Potential: Suicidal Ideations none  Homicidal Ideations none  Potential for Aggression No   Sensorium:  person, place, and situation   Memory:  recent and remote memory grossly intact   Consciousness:  alert and awake    Attention: attention span appeared shorter than expected for age   Insight:  fair   Judgment: fair   Gait/Station: Ataxic, uses a walker   Motor Activity: TD movements noted Progress Toward Goals:  Improving    Recommended Treatment: Continue with group therapy, milieu therapy and occupational therapy  Risks, benefits and possible side effects of Medications:   Risks, benefits, and possible side effects of medications explained to patient and patient verbalizes understanding  Medications: all current active meds have been reviewed  Labs: I have personally reviewed all pertinent laboratory/tests results  Most Recent Labs:   Lab Results   Component Value Date    WBC 5 60 07/23/2021    RBC 4 03 07/23/2021    HGB 12 2 07/23/2021    HCT 36 3 07/23/2021     07/23/2021    RDW 14 8 07/23/2021    NEUTROABS 3 40 07/23/2021    SODIUM 140 07/17/2021    K 4 0 07/17/2021     07/17/2021    CO2 25 07/17/2021    BUN 11 07/17/2021    CREATININE 0 63 07/17/2021    GLUC 96 07/17/2021    GLUF 96 07/17/2021    CALCIUM 8 9 07/17/2021    AST 12 07/11/2021    ALT 18 07/11/2021    ALKPHOS 104 07/11/2021    TP 7 8 07/11/2021    ALB 3 7 07/11/2021    TBILI 0 67 07/11/2021    CHOLESTEROL 215 (H) 02/13/2020    HDL 84 02/13/2020    TRIG 74 02/13/2020    LDLCALC 116 (H) 02/13/2020    NONHDLC 131 02/13/2020    LITHIUM 0 5 (L) 08/03/2021    AMMONIA <10 (L) 06/27/2021    UKF3KLFDXUQA 2 060 06/30/2021    FREET4 0 80 02/13/2020    PREGSERUM Negative 02/22/2014    RPR Non-Reactive 06/29/2021    HGBA1C 4 8 02/08/2021    EAG 91 02/08/2021       Counseling / Coordination of Care  Total floor / unit time spent today 20 minutes  Greater than 50% of total time was spent with the patient and / or family counseling and / or coordination of care   A description of the counseling / coordination of care:

## 2021-08-04 NOTE — NURSING NOTE
Pt constricted and withdrawn  Pt refused breakfast and lunch  Using wc on unit, transfers independently  VSS  Less anxious with no panic attacks noted  Attended group  Monitored for safety and support

## 2021-08-04 NOTE — PLAN OF CARE
Problem: Alteration in Thoughts and Perception  Goal: Treatment Goal: Gain control of psychotic behaviors/thinking, reduce/eliminate presenting symptoms and demonstrate improved reality functioning upon discharge  Outcome: Progressing  Goal: Verbalize thoughts and feelings  Description: Interventions:  - Promote a nonjudgmental and trusting relationship with the patient through active listening and therapeutic communication  - Assess patient's level of functioning, behavior and potential for risk  - Engage patient in 1 on 1 interactions  - Encourage patient to express fears, feelings, frustrations, and discuss symptoms    - [place] patient to reality, help patient recognize reality-based thinking   - Administer medications as ordered and assess for potential side effects  - Provide the patient education related to the signs and symptoms of the illness and desired effects of prescribed medications  Outcome: Progressing  Goal: Refrain from acting on delusional thinking/internal stimuli  Description: Interventions:  - Monitor patient closely, per order   - Utilize least restrictive measures   - Set reasonable limits, give positive feedback for acceptable   - Administer medications as ordered and monitor of potential side effects  Outcome: Progressing  Goal: Agree to be compliant with medication regime, as prescribed and report medication side effects  Description: Interventions:  - Offer appropriate PRN medication and supervise ingestion; conduct AIMS, as needed   Outcome: Progressing  Goal: Attend and participate in unit activities, including therapeutic, recreational, and educational groups  Description: Interventions:  -Encourage Visitation and family involvement in care  Outcome: Progressing  Goal: Recognize dysfunctional thoughts, communicate reality-based thoughts at the time of discharge  Description: Interventions:  - Provide medication and psycho-education to assist patient in compliance and developing insight into his/her illness   Outcome: Progressing  Goal: Complete daily ADLs, including personal hygiene independently, as able  Description: Interventions:  - Observe, teach, and assist patient with ADLS  - Monitor and promote a balance of rest/activity, with adequate nutrition and elimination   Outcome: Progressing     Problem: Ineffective Coping  Goal: Identifies ineffective coping skills  Outcome: Progressing  Goal: Identifies healthy coping skills  Outcome: Progressing  Goal: Demonstrates healthy coping skills  Outcome: Progressing  Goal: Patient/Family participate in treatment and DC plans  Description: Interventions:  - Provide therapeutic environment  Outcome: Progressing  Goal: Patient/Family verbalizes awareness of resources  Outcome: Progressing     Problem: Anxiety  Goal: Anxiety is at manageable level  Description: Interventions:  - Assess and monitor patient's anxiety level  - Monitor for signs and symptoms (heart palpitations, chest pain, shortness of breath, headaches, nausea, feeling jumpy, restlessness, irritable, apprehensive)  - Collaborate with interdisciplinary team and initiate plan and interventions as ordered    - [place] patient to unit/surroundings  - Explain treatment plan  - Encourage participation in care  - Encourage verbalization of concerns/fears  - Identify coping mechanisms  - Assist in developing anxiety-reducing skills  - Administer/offer alternative therapies  - Limit or eliminate stimulants  Outcome: Progressing     Problem: Ineffective Coping  Goal: Participates in unit activities  Description: Interventions:  - Provide therapeutic environment   - Provide required programming   - Redirect inappropriate behaviors   Outcome: Progressing     Problem: BEHAVIOR  Goal: Pt/Family maintain appropriate behavior and adhere to behavioral management agreement, if implemented  Description: INTERVENTIONS:  - Assess the family dynamic   - Encourage verbalization of thoughts and concerns in a socially appropriate manner  - Assess patient/family's coping skills and non-compliant behavior (including use of illegal substances)  - Utilize positive, consistent limit setting strategies supporting safety of patient, staff and others  - Initiate consult with Case Management, Spiritual Care or other ancillary services as appropriate  - If a patient's/visitor's behavior jeopardizes the safety of the patient, staff, or others, refer to organization procedure     - Notify Security of behavior or suspected illegal substances which indicate the need for search of the patient and/or belongings  - Encourage participation in the decision making process about a behavioral management agreement; implement if patient meets criteria  Outcome: Progressing     Problem: DISCHARGE PLANNING  Goal: Discharge to home or other facility with appropriate resources  Description: INTERVENTIONS:  - Identify barriers to discharge w/patient and caregiver  - Arrange for needed discharge resources and transportation as appropriate  - Identify discharge learning needs (meds, wound care, etc )  - Arrange for interpretive services to assist at discharge as needed  - Refer to Case Management Department for coordinating discharge planning if the patient needs post-hospital services based on physician/advanced practitioner order or complex needs related to functional status, cognitive ability, or social support system  Outcome: Progressing

## 2021-08-04 NOTE — PROGRESS NOTES
Pt refused to complete BH relapse prevention plan  Pt stated  She already completed  No records were on file  Provided further education on document and pt continued to refuse  Encouraged pt to attend groups today  Pt was hopeful of d/c tomorrow        08/04/21 09   Activity/Group Checklist   Group Admission/Discharge   Attendance Refused

## 2021-08-04 NOTE — NURSING NOTE
Pt came to nursing station reporting inability to sleep  Remeron given as ordered  Pt returned to room   Will continue to monitor

## 2021-08-04 NOTE — PROGRESS NOTES
08/03/21 1000   Activity/Group Checklist   Group Other (Comment)  (Group Art Therapy/Psychodynamic, Open Choice)   Attendance Attended   Attendance Duration (min) Greater than 60   Interactions Interacted appropriately   Affect/Mood Appropriate   Goals Achieved Able to listen to others; Able to engage in interactions  (Able to engage with tactile materials; limited participation)

## 2021-08-04 NOTE — NURSING NOTE
Patient rested in bed with eyes closed throughout the night wit no issues  Prn Miralax administered for constipation  Awaiting results

## 2021-08-04 NOTE — SOCIAL WORK
This writer spoke with Lydia Garcia with [facility]  Lydia Garcia confirms patient's appointment with Dr Lorraine Patel 8/9 at 12:30 pm  This will be an in person visit due to her hospital admission  Lydia Garcia reports she is on a wait list for individual therapy  He reports she Recovery Wellness Team and Case Management however neither will work with the patient due to previous involvement with their services  This writer called patient's son Josy Fowler to discuss her discharge on 8/5/21  Josy Fowler states his sister Sean Orellanar will transport his mother home  This writer met with patient to discuss her upcoming discharge  Patient revealed she has applied for the Waiver program and awaiting the nurse home assessment   Patient will follow up with her PCP and psychiatrist

## 2021-08-05 VITALS
WEIGHT: 198.85 LBS | HEIGHT: 61 IN | TEMPERATURE: 97.4 F | OXYGEN SATURATION: 100 % | BODY MASS INDEX: 37.54 KG/M2 | RESPIRATION RATE: 16 BRPM | HEART RATE: 76 BPM | SYSTOLIC BLOOD PRESSURE: 115 MMHG | DIASTOLIC BLOOD PRESSURE: 71 MMHG

## 2021-08-05 LAB
ANION GAP SERPL CALCULATED.3IONS-SCNC: 6 MMOL/L (ref 5–14)
BUN SERPL-MCNC: 5 MG/DL (ref 5–25)
CALCIUM SERPL-MCNC: 9.1 MG/DL (ref 8.4–10.2)
CHLORIDE SERPL-SCNC: 104 MMOL/L (ref 97–108)
CO2 SERPL-SCNC: 32 MMOL/L (ref 22–30)
CREAT SERPL-MCNC: 0.82 MG/DL (ref 0.6–1.2)
GFR SERPL CREATININE-BSD FRML MDRD: 92 ML/MIN/1.73SQ M
GLUCOSE P FAST SERPL-MCNC: 101 MG/DL (ref 70–99)
GLUCOSE SERPL-MCNC: 101 MG/DL (ref 70–99)
POTASSIUM SERPL-SCNC: 3.9 MMOL/L (ref 3.6–5)
SODIUM SERPL-SCNC: 142 MMOL/L (ref 137–147)

## 2021-08-05 PROCEDURE — 80048 BASIC METABOLIC PNL TOTAL CA: CPT | Performed by: PHYSICIAN ASSISTANT

## 2021-08-05 RX ORDER — MORPHINE SULFATE 15 MG/1
15 TABLET, FILM COATED, EXTENDED RELEASE ORAL EVERY 12 HOURS SCHEDULED
Qty: 20 TABLET | Refills: 0 | Status: SHIPPED | OUTPATIENT
Start: 2021-08-05 | End: 2021-08-15

## 2021-08-05 RX ORDER — LITHIUM CARBONATE 300 MG/1
300 TABLET, FILM COATED, EXTENDED RELEASE ORAL
Qty: 30 TABLET | Refills: 0 | Status: SHIPPED | OUTPATIENT
Start: 2021-08-05 | End: 2022-06-20 | Stop reason: SDUPTHER

## 2021-08-05 RX ORDER — PAROXETINE 30 MG/1
60 TABLET, FILM COATED ORAL DAILY
Qty: 60 TABLET | Refills: 0 | Status: SHIPPED | OUTPATIENT
Start: 2021-08-06 | End: 2022-04-05 | Stop reason: SDUPTHER

## 2021-08-05 RX ORDER — PROPRANOLOL HYDROCHLORIDE 20 MG/1
20 TABLET ORAL
Qty: 90 TABLET | Refills: 0 | Status: SHIPPED | OUTPATIENT
Start: 2021-08-05 | End: 2021-12-06 | Stop reason: SDUPTHER

## 2021-08-05 RX ORDER — ATORVASTATIN CALCIUM 40 MG/1
40 TABLET, FILM COATED ORAL
Qty: 30 TABLET | Refills: 0 | Status: SHIPPED | OUTPATIENT
Start: 2021-08-05 | End: 2021-08-12

## 2021-08-05 RX ORDER — AMOXICILLIN 250 MG
1 CAPSULE ORAL DAILY PRN
Qty: 30 TABLET | Refills: 0 | Status: SHIPPED | OUTPATIENT
Start: 2021-08-05 | End: 2022-02-28 | Stop reason: CLARIF

## 2021-08-05 RX ORDER — QUETIAPINE FUMARATE 25 MG/1
50 TABLET, FILM COATED ORAL
Qty: 120 TABLET | Refills: 0 | Status: SHIPPED | OUTPATIENT
Start: 2021-08-05 | End: 2022-03-02

## 2021-08-05 RX ORDER — BUSPIRONE HYDROCHLORIDE 15 MG/1
15 TABLET ORAL 3 TIMES DAILY
Qty: 90 TABLET | Refills: 0 | Status: SHIPPED | OUTPATIENT
Start: 2021-08-05 | End: 2022-03-02 | Stop reason: CLARIF

## 2021-08-05 RX ORDER — MIRTAZAPINE 7.5 MG/1
7.5 TABLET, FILM COATED ORAL
Qty: 30 TABLET | Refills: 0 | Status: SHIPPED | OUTPATIENT
Start: 2021-08-05 | End: 2022-02-28 | Stop reason: SDUPTHER

## 2021-08-05 RX ORDER — POTASSIUM CHLORIDE 20 MEQ/1
20 TABLET, EXTENDED RELEASE ORAL DAILY
Qty: 30 TABLET | Refills: 0 | Status: SHIPPED | OUTPATIENT
Start: 2021-08-06 | End: 2021-12-06 | Stop reason: SDUPTHER

## 2021-08-05 RX ADMIN — POTASSIUM CHLORIDE 20 MEQ: 20 TABLET, EXTENDED RELEASE ORAL at 08:22

## 2021-08-05 RX ADMIN — ASPIRIN 81 MG CHEWABLE TABLET 81 MG: 81 TABLET CHEWABLE at 08:23

## 2021-08-05 RX ADMIN — ACETAMINOPHEN 975 MG: 325 TABLET ORAL at 05:43

## 2021-08-05 RX ADMIN — PROPRANOLOL HYDROCHLORIDE 20 MG: 20 TABLET ORAL at 06:02

## 2021-08-05 RX ADMIN — QUETIAPINE 75 MG: 50 TABLET ORAL at 08:22

## 2021-08-05 RX ADMIN — PANTOPRAZOLE SODIUM 20 MG: 20 TABLET, DELAYED RELEASE ORAL at 05:43

## 2021-08-05 RX ADMIN — MORPHINE SULFATE 15 MG: 15 TABLET, EXTENDED RELEASE ORAL at 08:22

## 2021-08-05 RX ADMIN — AMLODIPINE BESYLATE 5 MG: 5 TABLET ORAL at 08:22

## 2021-08-05 RX ADMIN — PHENYTOIN 2 MG: 125 SUSPENSION ORAL at 08:23

## 2021-08-05 RX ADMIN — PAROXETINE HYDROCHLORIDE 60 MG: 20 TABLET, FILM COATED ORAL at 08:23

## 2021-08-05 RX ADMIN — VALBENAZINE 80 MG: 80 CAPSULE ORAL at 08:24

## 2021-08-05 RX ADMIN — PREGABALIN 100 MG: 50 CAPSULE ORAL at 08:22

## 2021-08-05 RX ADMIN — BUSPIRONE HYDROCHLORIDE 15 MG: 15 TABLET ORAL at 08:23

## 2021-08-05 RX ADMIN — HYDROXYZINE HYDROCHLORIDE 50 MG: 50 TABLET, FILM COATED ORAL at 08:22

## 2021-08-05 RX ADMIN — FOLIC ACID 1000 MCG: 1 TABLET ORAL at 08:23

## 2021-08-05 RX ADMIN — PROPRANOLOL HYDROCHLORIDE 20 MG: 20 TABLET ORAL at 11:41

## 2021-08-05 NOTE — NURSING NOTE
Pt anxious but optimistic regarding discharge today  Med-compliant with good appetite  Using wc and transfers independently  VSS  Attended group  Pt expressed understanding of d/c meds and f/u instructions  Pt left unit with all her belongings and escorted by staff at [phone] to meet daughter in lobby for transport home  Monitored for safety and support

## 2021-08-05 NOTE — PROGRESS NOTES
08/05/21 0838   Team Meeting   Meeting Type Daily Rounds   Team Members Present   Team Members Present Physician;Nurse;;; Occupational Therapist   Physician Team Member Gross   Nursing Team Member [facility] Management Team Member [place] [address]   Social Work Team Member [employer]   OT Team Member Viola   Patient/Family Present   Patient Present No   Patient's Family Present No     Patient denies all symptoms, except at night  Patient received PRN for sleep  Patient will be discharged today

## 2021-08-05 NOTE — BH TRANSITION RECORD
Contact Information: If you have any questions, concerns, pended studies, tests and/or procedures, or emergencies regarding your inpatient behavioral health visit  Please contact [facility] older adult behavioral health unit 6B [phone] and ask to speak to a , nurse or physician  A contact is available 24 hours/ 7 days a week at this number  Summary of Procedures Performed During your Stay:  Below is a list of major procedures performed during your hospital stay and a summary of results:  - No major procedures performed  Pending Studies (From admission, onward)    None        If studies are pending at discharge, follow up with your PCP and/or referring provider

## 2021-08-05 NOTE — PLAN OF CARE
Problem: DISCHARGE PLANNING  Goal: Discharge to home or other facility with appropriate resources  Description: INTERVENTIONS:  - Identify barriers to discharge w/patient and caregiver  - Arrange for needed discharge resources and transportation as appropriate  - Identify discharge learning needs (meds, wound care, etc )  - Arrange for interpretive services to assist at discharge as needed  - Refer to Case Management Department for coordinating discharge planning if the patient needs post-hospital services based on physician/advanced practitioner order or complex needs related to functional status, cognitive ability, or social support system  Outcome: Adequate for Discharge   Patient will be discharged home  Patient will continue with Waiver application  Patient's daughter will transport her home  Patient will continue with behavioral services at [facility]  Patient and family are in agreement with discharge

## 2021-08-05 NOTE — NURSING NOTE
Patient came to the nurses station stating she has panic attack and cannot breathe  VS checked and saturation was 100% and pulse 77  Patient was instructed to take a deep therapeutic breaths and PRN Remeron given to help patient with sleep

## 2021-08-06 NOTE — DISCHARGE SUMMARY
Psychiatric Discharge Summary    Medical Record Number: [id number]  Encounter: [id number]    Discharge diagnosis:  Bipolar II disorder ([facility] )    Secondary diagnoses:  Medical Problems     Problem List     * (Principal) Bipolar II disorder ([facility] )    Chronic bilateral low back pain with bilateral sciatica    Right knee pain    Chronic pain syndrome    Lumbar radiculopathy    Lumbar spondylosis    Fibromyalgia    Lumbar disc disease with radiculopathy    Spinal stenosis of lumbar region with neurogenic claudication    Anxiety    Pain in joint, shoulder region    Intractable low back pain    Overview Signed 2/15/2018  1:43 PM by Shanice Jerez MD     Description: The patient still has limited range of motion with her low back  Continue Baclofen  Refuses physical therapy  Bilateral hip pain    Cognitive disorder    Esophageal reflux    Overview Signed 2/15/2018  1:43 PM by Shanice Jerez MD     Description: stable on ppi         Fatigue    Female pelvic pain    Generalized anxiety disorder    Essential hypertension    Overview Signed 2/15/2018  1:43 PM by Shanice Jerez MD     Description: Continue Amlodipine  History of hypokalemia    Overview Signed 2/15/2018  1:43 PM by Shanice Jerez MD     Description: Continue Potassium chloride  Check a BMP  Insomnia    Major depressive disorder, recurrent episode, moderate degree ([facility] )    Overview Signed 2/15/2018  1:43 PM by Shanice Jerez MD     Transitioned From: Depression         Migraine    Overview Signed 2/15/2018  1:43 PM by Shanice Jerez MD     Description: pt reports ha are better controlled since she is on migraine prophyalxis, propranolol 20mg bid  will continue  Opioid dependence ([facility] )    Osteoarthritis of both hips    Osteoarthritis of knee    Overview Signed 2/15/2018  1:43 PM by Shanice Jerez MD     Description: Continue Tylenol and Naproxen   Encourage exercise and weight loss  Patient refused physical therapy  I will refer the patient back to Orthopedics  Overactive bladder    Left hip pain    Overview Signed 2/15/2018  1:43 PM by Tahira Clayton MD     Description: Right Hip X-Ray was normal  However patient was still having pain  Panic disorder without agoraphobia    Post traumatic stress disorder    Primary localized osteoarthritis of both knees    Recent unexplained weight loss    Sacroiliitis (HCC)    Tremor    Overview Signed 2/15/2018  1:43 PM by Tahira Clayton MD     Description: Continue Primidone  Urinary incontinence    Overview Signed 2/15/2018  1:43 PM by Tahira Clayton MD     Description: I will continue the patient on Oxybutynin  I will get prior authorization for this medication  Vitamin D deficiency    Post laminectomy syndrome    Encounter for long-term use of opiate analgesic    Family history of colorectal cancer    Overview Addendum 2019 11:38 AM by Emani Castelan DO     Mother, brother (), maternal aunt, all < or = age 61, last colonoscopy , small rectal hyperplastic polyp; q 5 year f/u  Consideration given to genetics evaluation   F/u colonoscopy          Morbid obesity with BMI of 40 0-44 9, adult ([address] )    Complaint related to dreams    MIMI (obstructive sleep apnea)    Tardive dyskinesia    Primary osteoarthritis of both knees    Patellofemoral disorder of both knees    Rash    Superficial bacterial infection of skin    Scar of back    Impaired skin integrity associated with surgical incision    Status post insertion of spinal cord stimulator    Ambulatory dysfunction    Dysphagia    Arthritis of right knee    Multiple closed fractures of metatarsal bone of right foot    Chronic back pain    Nausea    Encephalopathy    History of CVA (cerebrovascular accident)    Medical clearance for psychiatric admission    Mixed hyperlipidemia    Leukopenia                HPI:     This is a single 57-year-old woman who was admitted on 02/01 for increased symptoms of anxiety and panic and feeling overwhelmed and frightened  She has been in the emergency room a number of times in last   She has had a lot of breakthrough panic and anxiety despite being medicated in co-op with her outpatient treatment at [facility] act  Addition she has multiple medical problems including chronic back pain, uses a walker and has difficulty ambulating and moving who due to her orthopedic issues  Also seem to have the attention seeking and med seeking capabilities  Brief Hospital Course:     After the patient was admitted to the psychiatric unit  the patient had several psychotropic medication adjustments made to help stabilize their mood  Following these medication changes, the patient started to stabilize  Finally on 8/6/2021, the patient was found to be stable for discharge  On the day of discharge the patient reported their symptoms as significantly improved  All psychiatric medications were being tolerated without side effect or adverse event  No suicidal or homicidal ideations were present  The patient expressed their readiness and willingness to be discharged and referral to outpatient treatment was made  The case was discussed with Dr Papa Crook and he has deemed the patient stable for discharge  Condition on discharge:     Improved    Medications Upon Discharge:     No current facility-administered medications for this encounter      Current Outpatient Medications:     amLODIPine (NORVASC) 5 mg tablet, Take 1 tablet (5 mg total) by mouth daily, Disp: 90 tablet, Rfl: 3    atorvastatin (LIPITOR) 40 mg tablet, TAKE 1 TABLET BY MOUTH DAILY WITH DINNER, Disp: 30 tablet, Rfl: 0    atorvastatin (LIPITOR) 40 mg tablet, Take 1 tablet (40 mg total) by mouth daily with dinner, Disp: 30 tablet, Rfl: 0    busPIRone (BUSPAR) 15 mg tablet, TAKE 1 TABLET BY MOUTH 3 TIMES A DAY , Disp: 90 tablet, Rfl: 0    busPIRone (BUSPAR) 15 mg tablet, Take 1 tablet (15 mg total) by mouth 3 (three) times a day, Disp: 90 tablet, Rfl: 0    [employer] Aspirin Adult Low Dose 81 MG chewable tablet, CHEW 1 TABLET BY MOUTH DAILY, Disp: 30 tablet, Rfl: 0    [employer] Vitamin C 500 MG tablet, TAKE 1 TABLET BY MOUTH TWICE A DAY, Disp: 60 tablet, Rfl: 0    Diapers & Supplies (Huggies Pull-Ups) MISC, Use 3 (three) times a day, Disp: 100 each, Rfl: 3    Diclofenac Sodium (VOLTAREN) 1 %, Apply 2 g topically 4 (four) times a day, Disp: 150 g, Rfl: 0    ferrous sulfate 324 (65 Fe) mg, TAKE 1 TABLET (324 MG TOTAL) BY MOUTH 2 (TWO) TIMES A DAY BEFORE MEALS, Disp: 60 tablet, Rfl: 1    folic acid (FOLVITE) 1 mg tablet, TAKE 1 TABLET BY MOUTH EVERY DAY, Disp: 30 tablet, Rfl: 1    lithium carbonate (LITHOBID) 300 mg CR tablet, Take 1 tablet (300 mg total) by mouth daily at bedtime, Disp: 30 tablet, Rfl: 0    mirtazapine (REMERON) 7 5 MG tablet, Take 1 tablet (7 5 mg total) by mouth daily at bedtime as needed (insomnia), Disp: 30 tablet, Rfl: 0    morphine (MS CONTIN) 15 mg 12 hr tablet, Take 1 tablet (15 mg total) by mouth every 12 (twelve) hours for 10 daysMax Daily Amount: 30 mg, Disp: 20 tablet, Rfl: 0    naloxone (NARCAN) 4 mg/0 1 mL nasal spray, Administer 1 spray into a nostril  If no response after 2-3 minutes, give another dose in the other nostril using a new spray , Disp: 1 each, Rfl: 1    pantoprazole (PROTONIX) 20 mg tablet, TAKE 1 TABLET (20 MG TOTAL) BY MOUTH DAILY IN THE EARLY MORNING, Disp: 30 tablet, Rfl: 0    PARoxetine (PAXIL) 30 mg tablet, Take 2 tablets (60 mg total) by mouth daily, Disp: 60 tablet, Rfl: 0    potassium chloride (K-DUR,KLOR-CON) 20 mEq tablet, Take 1 tablet (20 mEq total) by mouth daily, Disp: 30 tablet, Rfl: 0    potassium chloride (MICRO-K) 10 MEQ CR capsule, Take 2 capsules (20 mEq total) by mouth daily Take 2 tablets (20 meq total) daily  , Disp: 60 capsule, Rfl: 0    prazosin (MINIPRESS) 2 mg capsule, TAKE 1 CAPSULE BY MOUTH EVERY DAY, Disp: 30 capsule, Rfl: 1    pregabalin (LYRICA) 100 mg capsule, Take 1 capsule (100 mg total) by mouth 2 (two) times a day, Disp: 60 capsule, Rfl: 1    propranolol (INDERAL) 20 mg tablet, Take 1 tablet (20 mg total) by mouth 2 (two) times a day before breakfast and lunch, Disp: 90 tablet, Rfl: 0    QUEtiapine (SEROquel) 25 mg tablet, Take 2 tablets (50 mg total) by mouth 4 (four) times daily (after meals and at bedtime), Disp: 120 tablet, Rfl: 0    Respiratory Therapy Supplies (Nebulizer) YUDY, Use as needed (Shortness of breath) (Patient not taking: Reported on 4/20/2021), Disp: 1 each, Rfl: 0    senna-docusate sodium (SENOKOT S) 8 6-50 mg per tablet, Take 1 tablet by mouth daily as needed for constipation, Disp: 30 tablet, Rfl: 0    Valbenazine Tosylate (Ingrezza) 80 MG CAPS, Take 80 mg by mouth daily, Disp: 30 capsule, Rfl: 1    Trixie Vo MD

## 2021-08-11 ENCOUNTER — PATIENT OUTREACH (OUTPATIENT)
Dept: INTERNAL MEDICINE CLINIC | Facility: CLINIC | Age: 56
End: 2021-08-11

## 2021-08-11 NOTE — PROGRESS NOTES
Outpatient Care Management Note:     received message from patient requesting a call back  We returned call to patient  She is requesting an update regarding PA waiver program  We were also able to speak with her son Marielle Lock who was with her at the time  Instructed to call [facility] at [phone] and request the name and contact number for care coordinator assigned to patient to further discuss need of care at home  Son reports he will need to return to work on 8/18/21 and is working with his sister so patient is not alone  Son reports he needs to return to work as their rent will be going up $250 next month  Per son he is still interested in becoming patient's paid caregiver  He was reminded that the home care agency they choose can also provide aides for hours that they cannot fill too  Son is aware of PCP appointment tomorrow 8/12 @ 2:30 pm and on 8/17 @ 3 pm  Did instruct son to bring all medication bottles with them to these appointments  Son is helping to manage patient's medication  Son reports patient did go to mental health appointment on 8/9 with [facility] mental health at [employer]  Son reports it was an in person visit   did encourage son to reach out to Ashley Valdez,  at [address] [place] [address] [address] regarding [facility] services  Phone number [phone] or [phone]  Patient and son do not have any further questions, concerns, or other needs at this time  They have my contact # and PCP office # if needed  Pt is agreeable for further outreach

## 2021-08-11 NOTE — PROGRESS NOTES
HIRAM returned call tp pt /son this date  Also present was Reyes Lobo RN/CM   We have reminded pt/son about her appointment here tomorrow   Both are aware  Pt was asking for help to find out how to get her PA Waiver started  SW did explain to both that she needs to speak with her   SW has provided both with the main # for [facility] and explained they need to ask for her   At present son reports he needs to go back to work 8/18/21  He relates his sister will need to come to assist pt during the day as sammy HARRISON has suggested also working with an agency who can provide the aides to help supplement their time  HIRAM has also asked for an update on her ICM referral     Pt relates she did go to [address] [place] [address] provider yesterday  They focused on other needs since she was recently discharged from the hospital     Sw did provide them both  e# for [address]  located there for additional help re same  CM to f/u with pt at next PCP visit and assist as indicated

## 2021-08-12 ENCOUNTER — OFFICE VISIT (OUTPATIENT)
Dept: INTERNAL MEDICINE CLINIC | Facility: CLINIC | Age: 56
End: 2021-08-12

## 2021-08-12 ENCOUNTER — PATIENT OUTREACH (OUTPATIENT)
Dept: INTERNAL MEDICINE CLINIC | Facility: CLINIC | Age: 56
End: 2021-08-12

## 2021-08-12 VITALS
DIASTOLIC BLOOD PRESSURE: 77 MMHG | WEIGHT: 199 LBS | TEMPERATURE: 98.3 F | SYSTOLIC BLOOD PRESSURE: 106 MMHG | HEART RATE: 63 BPM | BODY MASS INDEX: 37.6 KG/M2

## 2021-08-12 DIAGNOSIS — M54.41 CHRONIC BILATERAL LOW BACK PAIN WITH BILATERAL SCIATICA: ICD-10-CM

## 2021-08-12 DIAGNOSIS — Z86.73 HISTORY OF CVA (CEREBROVASCULAR ACCIDENT): ICD-10-CM

## 2021-08-12 DIAGNOSIS — F11.20 UNCOMPLICATED OPIOID DEPENDENCE (HCC): ICD-10-CM

## 2021-08-12 DIAGNOSIS — R25.1 TREMOR: ICD-10-CM

## 2021-08-12 DIAGNOSIS — F33.1 MAJOR DEPRESSIVE DISORDER, RECURRENT EPISODE, MODERATE DEGREE (HCC): ICD-10-CM

## 2021-08-12 DIAGNOSIS — G89.29 CHRONIC BILATERAL LOW BACK PAIN WITH BILATERAL SCIATICA: ICD-10-CM

## 2021-08-12 DIAGNOSIS — N32.81 OVERACTIVE BLADDER: ICD-10-CM

## 2021-08-12 DIAGNOSIS — M54.42 CHRONIC BILATERAL LOW BACK PAIN WITH BILATERAL SCIATICA: ICD-10-CM

## 2021-08-12 DIAGNOSIS — F43.10 POST TRAUMATIC STRESS DISORDER: ICD-10-CM

## 2021-08-12 DIAGNOSIS — F41.1 GENERALIZED ANXIETY DISORDER: ICD-10-CM

## 2021-08-12 DIAGNOSIS — G89.4 CHRONIC PAIN SYNDROME: ICD-10-CM

## 2021-08-12 DIAGNOSIS — F41.0 PANIC DISORDER WITHOUT AGORAPHOBIA: ICD-10-CM

## 2021-08-12 DIAGNOSIS — Z76.89 ENCOUNTER FOR SUPPORT AND COORDINATION OF TRANSITION OF CARE: Primary | ICD-10-CM

## 2021-08-12 PROCEDURE — 80307 DRUG TEST PRSMV CHEM ANLYZR: CPT | Performed by: STUDENT IN AN ORGANIZED HEALTH CARE EDUCATION/TRAINING PROGRAM

## 2021-08-12 PROCEDURE — 99496 TRANSJ CARE MGMT HIGH F2F 7D: CPT | Performed by: HOSPITALIST

## 2021-08-12 RX ORDER — BUSPIRONE HYDROCHLORIDE 10 MG/1
TABLET ORAL
COMMUNITY
Start: 2021-07-12 | End: 2021-09-23 | Stop reason: HOSPADM

## 2021-08-12 RX ORDER — HYDROXYZINE 50 MG/1
50 TABLET, FILM COATED ORAL 3 TIMES DAILY PRN
COMMUNITY

## 2021-08-12 NOTE — PROGRESS NOTES
MOISÉS met with pt asn son Marielle Lock this date art PCP appointment  Son relates he called [facility] but was told pt did not have a Care Coordinator at this time  Someone will call back in 3-4 days  Moisés also called [employer] [phone] and they confirmed no  assigned as yet  They shared a request received today and they will respond in a few days  Son encouraged to f/u with them to see when the Coordinator is  assigned  It remains pts  plan to have her son hired to be her caregiver  He believes he can flex his work time to accommodate same  Until this is possible the plan is for pt's dgt to stay with pt during her day when son works and he will take over the night shift  He also relates the family is looking into a multiunit house that they all maybe able to move in together to share the cost   MOISÉS did review pt is on several housing list but son realizes she will not qualify if hey live together as they would be over income  He also  does not feel pt should live alone  Pt at present does appear to feel most comfortable living with family and having her son be her care giver  Pt has also asked if she could get a motorized scooter? MOISÉS reviewed the process to request a wheelchair evaluation and to call [facility] for an appointment to be evaluated  MOISÉS did discuss same with PCP and whe agreed and has written this order  Son has the order and # to call to request an evaluaton  Moisés also asked if they have any update on obtaining an ICM   Son did not get to call but relates he will call Mr Justino Devlin of   [facility]  Sw has provide him the #   SW to remain avavialbe to assist as indicted

## 2021-08-12 NOTE — PROGRESS NOTES
[facility]  INTERNAL MEDICINE TCM VISIT     PATIENT INFORMATION     Samantha Agustin   60 y o  female   MRN: [id number]    ASSESSMENT/PLAN     Diagnoses and all orders for this visit:    Encounter for support and coordination of transition of care  · Completed med reconciliation with patient  · Discussed recent hospitalization and improvement in symptoms since admission to behavioral health unit  · Discussed ongoing care with patient and son  · Grace Marino involved in care plan - working with patient's son to establish with waiver program  · Emphasized importance of psychiatry follow-up with significant psych medication regimen patient was discharged on    Chronic pain syndrome  Chronic bilateral low back pain with bilateral sciatica  · Plan to taper opioids in future as detailed below  · Patient currently utilizing rolling walker, however, unable to fully get around at this time  Will give referral to wheelchair clinic  Patient also with extensive tremor and profound weakness of B/L upper extremities  She will likely be unable to utilize wheelchair on her own 2/2 to these conditions  Will likely benefit from motorized wheelchair in order to allow patient to complete ADL's on her own, and function appropriately in her current apartment  -     Ambulatory referral to Physical Therapy; Future - physical therapy eval/treat, massage therapy  -     Ambulatory referral to Pain Management; Future - evaluation for epidural injections  -     Ambulatory referral to Physical Therapy; Future - wheelchair evaluation    Uncomplicated opioid dependence ([address] )  Patient on chronic opioids for low back and leg pain    · Discussed with patient that after d/c of oxycodone during recent hospitalization, we will no longer be prescribing her this medication  · She was discharged with 10 day course of Morphine on 8/5  · We will check urine drug screen today, and pending those results, she will be due for refill on 8/15  · Advised to call office on 8/15 and will get new rx for 10 day supply, 20 tablets  · Discussed that in the future we will be tapering this medication, once psychiatrically more stable  · Also advised that if patient's UDS today is negative for opioids, that she will no longer be receiving narcotics from our office as she will have violated her contract x2  -     Oxycodone/Oxymorphone urine  -     Toxicology screen, urine    Overactive bladder  · Discussed that this may be due to many of her psychiatric medications  · DO NOT restart oxybutynin as this can interact with psych meds, and worsen symptoms  · Would recommend addressing with psychiatrist at [facility], [place], operated by [facility]  Generalized anxiety disorder  Major depressive disorder, recurrent episode, moderate degree (HCC)  Panic disorder without agoraphobia  Post traumatic stress disorder  Patient with recent behavioral health admission 2/2 panic attacks and uncontrolled anxiety  · Per AVS patient discharged on regimen including lithium 300mg QHS, mirtazapine 7 5mg QHS, paroxetine 60mg daily, buspirone 15mg TID, and seroquel 50mg 4x daily with meals and at bedtime  · Discussed at length with patient and attending that this is a very extensive/intricate regimen that our office will NOT be adjusting  · Patient advised importance of close continued follow up with psychiatrist and that it is best that one provider manages this regimen  · Currently with tremor and tardive dyskinesia 2/2 extensive psychiatric/antipsychotic regimen - recommend addressing with psychiatry office and continuing ingrezza and hydroxyzine as advised by their office    Tremor  · Advised likely 2/2 antipsychotic medications  Continue hydroxyzine and ingrezza at this time  Address with psychiatry at [facility], [place], operated by [facility] as regimen is extensive       HISTORY OF PRESENT ILLNESS     Reason for recent hospitalization: panic attacks and intractable back pain    TCM Call (since 7/12/2021)     Hospital care reviewed Records reviewed    Patient was hospitialized at  [facility]        Date of Admission  07/13/21    Date of discharge  07/15/21    Diagnosis  PANIC DISORDER - F41 0    Disposition  Rehabilitation center (Comment)  [facility]    Were the patients medications reviewed and updated  No      TCM Call (since 7/12/2021)     I have advised the patient to call PCP with any new or worsening symptoms  Stefanie Rosa LPN          Per Dr Radha Mathur Discharge Summary 8/5:  "This is a single 47-year-old woman who was admitted on 02/01 for increased symptoms of anxiety and panic and feeling overwhelmed and frightened  She has been in the emergency room a number of times in last   She has had a lot of breakthrough panic and anxiety despite being medicated in co-op with her outpatient treatment at [facility] act  Addition she has multiple medical problems including chronic back pain, uses a walker and has difficulty ambulating and moving who due to her orthopedic issues  Also seem to have the attention seeking and med seeking capabilities  After the patient was admitted to the psychiatric unit  the patient had several psychotropic medication adjustments made to help stabilize their mood  Following these medication changes, the patient started to stabilize  Finally on 8/6/2021, the patient was found to be stable for discharge  On the day of discharge the patient reported their symptoms as significantly improved  All psychiatric medications were being tolerated without side effect or adverse event  No suicidal or homicidal ideations were present  The patient expressed their readiness and willingness to be discharged and referral to outpatient treatment was made  The case was discussed with Dr Blayne Kelley and he has deemed the patient stable for discharge "    Patient presents to clinic today with son Hi Malik, who is currently her caretaker   At time of evaluation, patient reports panic attacks have improved  Patient continues to have severe anxiety interfering with sleep  Attempting to continue use of coping mechanisms learned while admitted to behavioral health  Has followed up with psychiatry [facility]) on 8/9  Looking for refills of morphine and oxycodone today  REVIEW OF SYSTEMS     Review of Systems   Constitutional: Negative for chills and fever  HENT: Negative for hearing loss, sore throat and trouble swallowing  Respiratory: Negative for cough and shortness of breath  Cardiovascular: Negative for chest pain, palpitations and leg swelling  Gastrointestinal: Negative for abdominal pain, constipation and diarrhea  Genitourinary: Positive for frequency  Negative for decreased urine volume, difficulty urinating, dysuria and hematuria  Musculoskeletal: Positive for arthralgias, back pain, gait problem and myalgias  Skin: Negative for color change and rash  Neurological: Positive for tremors, speech difficulty and weakness  Negative for dizziness and light-headedness  Psychiatric/Behavioral: Negative for confusion and suicidal ideas  The patient is nervous/anxious  OBJECTIVE     Vitals:    08/12/21 1422   BP: 106/77   BP Location: Right arm   Patient Position: Sitting   Cuff Size: Standard   Pulse: 63   Temp: 98 3 °F (36 8 °C)   TempSrc: Temporal   Weight: 90 3 kg (199 lb)     Physical Exam  Vitals reviewed  Constitutional:       General: She is not in acute distress  Appearance: She is obese  She is not ill-appearing, toxic-appearing or diaphoretic  HENT:      Head: Normocephalic and atraumatic  Mouth/Throat:      Mouth: Mucous membranes are moist       Pharynx: Oropharynx is clear  Eyes:      General:         Right eye: No discharge  Left eye: No discharge  Conjunctiva/sclera: Conjunctivae normal    Cardiovascular:      Rate and Rhythm: Normal rate and regular rhythm  Heart sounds: Normal heart sounds     Pulmonary:      Effort: Pulmonary effort is normal  No respiratory distress  Breath sounds: Normal breath sounds  No wheezing  Musculoskeletal:         General: Tenderness present  Normal range of motion  Right lower leg: No edema  Left lower leg: No edema  Skin:     General: Skin is warm and dry  Neurological:      General: No focal deficit present  Mental Status: She is alert and oriented to person, place, and time  Motor: Weakness present  Comments: Patient with action tremor of upper extremities, although notably resolves intermittently  Also with involuntary twitching and speech changes   Psychiatric:         Mood and Affect: Mood is anxious  Speech: Speech is slurred  Behavior: Behavior is cooperative  Thought Content: Thought content does not include suicidal ideation         CURRENT MEDICATIONS     Current Outpatient Medications:     amLODIPine (NORVASC) 5 mg tablet, Take 1 tablet (5 mg total) by mouth daily, Disp: 90 tablet, Rfl: 3    atorvastatin (LIPITOR) 40 mg tablet, TAKE 1 TABLET BY MOUTH DAILY WITH DINNER, Disp: 30 tablet, Rfl: 0    busPIRone (BUSPAR) 10 mg tablet, TAKE 2 TABLETS (20 MG TOTAL) BY MOUTH 3 (THREE) TIMES A DAY , Disp: , Rfl:     [employer] Aspirin Adult Low Dose 81 MG chewable tablet, CHEW 1 TABLET BY MOUTH DAILY, Disp: 30 tablet, Rfl: 0    CVS Vitamin C 500 MG tablet, TAKE 1 TABLET BY MOUTH TWICE A DAY, Disp: 60 tablet, Rfl: 0    Diapers & Supplies (Huggies Pull-Ups) MISC, Use 3 (three) times a day, Disp: 100 each, Rfl: 3    Diclofenac Sodium (VOLTAREN) 1 %, Apply 2 g topically 4 (four) times a day, Disp: 150 g, Rfl: 0    ferrous sulfate 324 (65 Fe) mg, TAKE 1 TABLET (324 MG TOTAL) BY MOUTH 2 (TWO) TIMES A DAY BEFORE MEALS, Disp: 60 tablet, Rfl: 1    folic acid (FOLVITE) 1 mg tablet, TAKE 1 TABLET BY MOUTH EVERY DAY, Disp: 30 tablet, Rfl: 1    hydrOXYzine HCL (ATARAX) 50 mg tablet, Take 50 mg by mouth 3 (three) times a day as needed for itching, Disp: , Rfl:    lithium carbonate (LITHOBID) 300 mg CR tablet, Take 1 tablet (300 mg total) by mouth daily at bedtime, Disp: 30 tablet, Rfl: 0    mirtazapine (REMERON) 7 5 MG tablet, Take 1 tablet (7 5 mg total) by mouth daily at bedtime as needed (insomnia), Disp: 30 tablet, Rfl: 0    morphine (MS CONTIN) 15 mg 12 hr tablet, Take 1 tablet (15 mg total) by mouth every 12 (twelve) hours for 10 daysMax Daily Amount: 30 mg, Disp: 20 tablet, Rfl: 0    pantoprazole (PROTONIX) 20 mg tablet, TAKE 1 TABLET (20 MG TOTAL) BY MOUTH DAILY IN THE EARLY MORNING, Disp: 30 tablet, Rfl: 0    PARoxetine (PAXIL) 30 mg tablet, Take 2 tablets (60 mg total) by mouth daily, Disp: 60 tablet, Rfl: 0    potassium chloride (K-DUR,KLOR-CON) 20 mEq tablet, Take 1 tablet (20 mEq total) by mouth daily, Disp: 30 tablet, Rfl: 0    prazosin (MINIPRESS) 2 mg capsule, TAKE 1 CAPSULE BY MOUTH EVERY DAY, Disp: 30 capsule, Rfl: 1    pregabalin (LYRICA) 100 mg capsule, Take 1 capsule (100 mg total) by mouth 2 (two) times a day, Disp: 60 capsule, Rfl: 1    propranolol (INDERAL) 20 mg tablet, Take 1 tablet (20 mg total) by mouth 2 (two) times a day before breakfast and lunch, Disp: 90 tablet, Rfl: 0    QUEtiapine (SEROquel) 25 mg tablet, Take 2 tablets (50 mg total) by mouth 4 (four) times daily (after meals and at bedtime), Disp: 120 tablet, Rfl: 0    senna-docusate sodium (SENOKOT S) 8 6-50 mg per tablet, Take 1 tablet by mouth daily as needed for constipation, Disp: 30 tablet, Rfl: 0    Valbenazine Tosylate (Ingrezza) 80 MG CAPS, Take 80 mg by mouth daily, Disp: 30 capsule, Rfl: 1    atorvastatin (LIPITOR) 40 mg tablet, Take 1 tablet (40 mg total) by mouth daily with dinner (Patient not taking: Reported on 8/12/2021), Disp: 30 tablet, Rfl: 0    busPIRone (BUSPAR) 15 mg tablet, TAKE 1 TABLET BY MOUTH 3 TIMES A DAY   (Patient not taking: Reported on 8/12/2021), Disp: 90 tablet, Rfl: 0    busPIRone (BUSPAR) 15 mg tablet, Take 1 tablet (15 mg total) by mouth 3 (three) times a day (Patient not taking: Reported on 2021), Disp: 90 tablet, Rfl: 0    naloxone (NARCAN) 4 mg/0 1 mL nasal spray, Administer 1 spray into a nostril  If no response after 2-3 minutes, give another dose in the other nostril using a new spray  (Patient not taking: Reported on 2021), Disp: 1 each, Rfl: 1    potassium chloride (MICRO-K) 10 MEQ CR capsule, Take 2 capsules (20 mEq total) by mouth daily Take 2 tablets (20 meq total) daily  (Patient not taking: Reported on 2021), Disp: 60 capsule, Rfl: 0    Respiratory Therapy Supplies (Nebulizer) YUDY, Use as needed (Shortness of breath) (Patient not taking: Reported on 2021), Disp: 1 each, Rfl: 0    HISTORICAL INFORMATION     Past Medical History:   Diagnosis Date    Acid reflux     Anxiety     RESOLVED: [id number]    Arthritis     Bipolar 2 disorder (HCC)     FOLLOWS WITH PSYCHIATRIST  CONTINUE LAMOTRIGINE; RESOLVED: [id number]    Depression     Familial tremor     both hands    Fibromyalgia     LAST ASSESSED: [id number]    Hearing aid worn     left ear    [place] (hard of hearing)     left ear    Hypertension     Left-sided weakness     Lower back pain     Memory loss of unknown cause     long and short term    Migraine     Obesity     Obesity, Class II, BMI 35-39 9     Overactive bladder     Panic attack     Post traumatic stress disorder     Seasonal allergies     Stroke [facility])     questionable stroke     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    CONT ASA 81 MG DAILY; RESOLVED: [id number]    Urinary incontinence     Wears dentures     partial lower / full upper    Wears glasses      Past Surgical History:   Procedure Laterality Date    BACK SURGERY       SECTION      COLONOSCOPY      RESOLVED: [id number]    EAR SURGERY      EGD      HYSTERECTOMY      MYRINGOTOMY W/ TUBES Left     NECK SURGERY  2019    CA CYSTOURETHROSCOPY N/A 2016    Procedure: CYSTOSCOPY, BOTOX INJECTION;  Surgeon: See Oh MD;  Location: [facility];  Service: Gynecology    MD IMPLANT SPINAL NEUROSTIM/ Right 2/10/2021    Procedure: REPLACEMENT IMPLANTABLE PULSE GENERATOR DORSAL SPINAL COLUMN STIMULATOR, RIGHT;  Surgeon: Fadi Gabriel MD;  Location: [facility];  Service: Neurosurgery    MD PERCUT IMPLNT [address] Right 7/28/2020    Procedure: INSERTION THORACIC DORSAL COLUMN SPINAL CORD STIMULATOR PERCUTANEOUS W IMPLANTABLE PULSE GENERATOR, RIGHT;  Surgeon: Fadi Gabriel MD;  Location: [facility];  Service: Neurosurgery    TONSILLECTOMY      TUBAL LIGATION  1986    UPPER GASTROINTESTINAL ENDOSCOPY  09/2020     Social History     Socioeconomic History    Marital status:      Spouse name: Not on file    Number of children: 2    Years of education: graduate school     Highest education level: Not on file   Occupational History    Occupation: Disabled   Tobacco Use    Smoking status: Never Smoker    Smokeless tobacco: Never Used   Vaping Use    Vaping Use: Never used   Substance and Sexual Activity    Alcohol use: Not Currently     Comment: 2 x year; being a social drinker as per all scripts     Drug use: No    Sexual activity: Not Currently   Other Topics Concern    Not on file   Social History Narrative    Bereavement    Daily caffeine consumption, 6-8 servings per day     as per all scripts    Lives in [address] [place] [address] Determinants of Health     Financial Resource Strain: Medium Risk    Difficulty of Paying Living Expenses: Somewhat hard   Food Insecurity: No Food Insecurity    Worried About [address] in the Last Year: Never true    [facility] in the Last Year: Never true   Transportation Needs: No Transportation Needs    Lack of Transportation (Medical): No    Lack of Transportation (Non-Medical):  No   Physical Activity: Inactive    Days of Exercise per Week: 0 days    Minutes of Exercise per Session: 0 min Stress:     Feeling of Stress :    Social Connections: Moderately Isolated    Frequency of Communication with Friends and Family: More than three times a week    Frequency of Social Gatherings with Friends and Family: More than three times a week    Attends Latter-day Services: More than 4 times per year    Active Member of [employer] or Organizations: No    Attends Club or Organization Meetings: Never    Marital Status:    Intimate Partner Violence: Not At Risk    Fear of Current or Ex-Partner: No    Emotionally Abused: No    Physically Abused: No    Sexually Abused: No     Family History   Problem Relation Age of Onset    Colon cancer Mother     Alzheimer's disease Father     Stroke Father     Colon cancer Brother     Bipolar disorder Brother     Breast cancer Maternal Aunt     Colon cancer Maternal Aunt     Heart disease Other     Diabetes Other     Hypertension Other     Seizures Son     Depression Paternal Grandfather     No Known Problems Sister     No Known Problems Brother     Thyroid disease Neg Hx      ==  José Miguel Harrison DO  Internal Medicine Resident, PGY-2  [facility]  [address]   [facility] , [address] [address] [employer] [address], [address] [facility]  Office: [phone]  Fax: [phone]

## 2021-08-13 ENCOUNTER — PATIENT OUTREACH (OUTPATIENT)
Dept: INTERNAL MEDICINE CLINIC | Facility: CLINIC | Age: 56
End: 2021-08-13

## 2021-08-13 ENCOUNTER — TELEPHONE (OUTPATIENT)
Dept: INTERNAL MEDICINE CLINIC | Facility: CLINIC | Age: 56
End: 2021-08-13

## 2021-08-14 LAB — OXYCODONE+OXYMORPHONE UR QL SCN: NEGATIVE NG/ML

## 2021-08-16 ENCOUNTER — HOSPITAL ENCOUNTER (EMERGENCY)
Facility: HOSPITAL | Age: 56
Discharge: HOME | End: 2021-08-16
Attending: EMERGENCY MEDICINE
Payer: COMMERCIAL

## 2021-08-16 VITALS
OXYGEN SATURATION: 96 % | WEIGHT: 199 LBS | DIASTOLIC BLOOD PRESSURE: 77 MMHG | SYSTOLIC BLOOD PRESSURE: 167 MMHG | RESPIRATION RATE: 20 BRPM | HEIGHT: 61 IN | HEART RATE: 83 BPM | TEMPERATURE: 99.9 F | BODY MASS INDEX: 37.57 KG/M2

## 2021-08-16 DIAGNOSIS — F41.0 PANIC ATTACK: Primary | ICD-10-CM

## 2021-08-16 LAB
D DIMER PPP FEU-MCNC: 0.38 UG/ML FEU
HOLD SPECIMEN: NORMAL

## 2021-08-16 PROCEDURE — 36415 COLL VENOUS BLD VENIPUNCTURE: CPT

## 2021-08-16 PROCEDURE — 99283 EMERGENCY DEPT VISIT LOW MDM: CPT

## 2021-08-16 PROCEDURE — 99285 EMERGENCY DEPT VISIT HI MDM: CPT | Performed by: EMERGENCY MEDICINE

## 2021-08-16 PROCEDURE — 85379 FIBRIN DEGRADATION QUANT: CPT

## 2021-08-16 RX ORDER — LORAZEPAM 2 MG/ML
0.5 INJECTION INTRAMUSCULAR ONCE
Status: COMPLETED | OUTPATIENT
Start: 2021-08-16 | End: 2021-08-16

## 2021-08-16 RX ADMIN — LORAZEPAM 0.5 MG: 2 INJECTION INTRAMUSCULAR; INTRAVENOUS at 16:06

## 2021-08-16 NOTE — ED PROVIDER NOTES
History  Chief Complaint   Patient presents with    Panic Attack     H/o anxiety, seen multiple times for same complaint  49-year-old female history of anxiety disorder presents to emergency department for panic attacks  Patient states yesterday she had acute onset of a panic attack in the morning  Attacks continued throughout the day and again today  No inciting event or recent illness  Patient states her panic attacks are usually unprovoked  Patient states her panic attacks are associated with rapid heart rate and rapid breathing  Patient discharged 2 weeks ago from [facility] inpatient behavior health  at [facility]  Patient states she has been compliant with medications  She called her psychiatrist today concerning her panic attacks and was told to come to the emergency room  Which is denies chest pain, abdominal pain, headache, fevers, recent illnesses  Patient has been wheelchair bound for the past 2 weeks due to worsening chronic pain in the legs  Prior to Admission Medications   Prescriptions Last Dose Informant Patient Reported? Taking?    [facility] Aspirin Adult Low Dose 81 MG chewable tablet   No No   Sig: CHEW 1 TABLET BY MOUTH DAILY   [facility] Vitamin C 500 MG tablet   No No   Sig: TAKE 1 TABLET BY MOUTH TWICE A DAY   Diapers & Supplies (Huggies Pull-Ups) MISC   No No   Sig: Use 3 (three) times a day   Diclofenac Sodium (VOLTAREN) 1 %   No No   Sig: Apply 2 g topically 4 (four) times a day   PARoxetine (PAXIL) 30 mg tablet   No No   Sig: Take 2 tablets (60 mg total) by mouth daily   QUEtiapine (SEROquel) 25 mg tablet   No No   Sig: Take 2 tablets (50 mg total) by mouth 4 (four) times daily (after meals and at bedtime)   Respiratory Therapy Supplies (Nebulizer) YUDY   No No   Sig: Use as needed (Shortness of breath)   Patient not taking: Reported on 4/20/2021   Valbenazine Tosylate (Ingrezza) 80 MG CAPS   No No   Sig: Take 80 mg by mouth daily   amLODIPine (NORVASC) 5 mg tablet   No No Sig: Take 1 tablet (5 mg total) by mouth daily   atorvastatin (LIPITOR) 40 mg tablet   No No   Sig: TAKE 1 TABLET BY MOUTH DAILY WITH DINNER   busPIRone (BUSPAR) 10 mg tablet   Yes No   Sig: TAKE 2 TABLETS (20 MG TOTAL) BY MOUTH 3 (THREE) TIMES A DAY  busPIRone (BUSPAR) 15 mg tablet   No No   Sig: Take 1 tablet (15 mg total) by mouth 3 (three) times a day   Patient not taking: Reported on 8/12/2021   ferrous sulfate 324 (65 Fe) mg   No No   Sig: TAKE 1 TABLET (324 MG TOTAL) BY MOUTH 2 (TWO) TIMES A DAY BEFORE MEALS   folic acid (FOLVITE) 1 mg tablet   No No   Sig: TAKE 1 TABLET BY MOUTH EVERY DAY   hydrOXYzine HCL (ATARAX) 50 mg tablet   Yes No   Sig: Take 50 mg by mouth 3 (three) times a day as needed for itching   lithium carbonate (LITHOBID) 300 mg CR tablet   No No   Sig: Take 1 tablet (300 mg total) by mouth daily at bedtime   mirtazapine (REMERON) 7 5 MG tablet   No No   Sig: Take 1 tablet (7 5 mg total) by mouth daily at bedtime as needed (insomnia)   morphine (MS CONTIN) 15 mg 12 hr tablet   No No   Sig: Take 1 tablet (15 mg total) by mouth every 12 (twelve) hours for 10 daysMax Daily Amount: 30 mg   naloxone (NARCAN) 4 mg/0 1 mL nasal spray   No No   Sig: Administer 1 spray into a nostril  If no response after 2-3 minutes, give another dose in the other nostril using a new spray     Patient not taking: Reported on 8/12/2021   pantoprazole (PROTONIX) 20 mg tablet   No No   Sig: TAKE 1 TABLET (20 MG TOTAL) BY MOUTH DAILY IN THE EARLY MORNING   potassium chloride (K-DUR,KLOR-CON) 20 mEq tablet   No No   Sig: Take 1 tablet (20 mEq total) by mouth daily   prazosin (MINIPRESS) 2 mg capsule  Outside Facility (Specify) No No   Sig: TAKE 1 CAPSULE BY MOUTH EVERY DAY   pregabalin (LYRICA) 100 mg capsule   No No   Sig: Take 1 capsule (100 mg total) by mouth 2 (two) times a day   propranolol (INDERAL) 20 mg tablet   No No   Sig: Take 1 tablet (20 mg total) by mouth 2 (two) times a day before breakfast and lunch senna-docusate sodium (SENOKOT S) 8 6-50 mg per tablet   No No   Sig: Take 1 tablet by mouth daily as needed for constipation      Facility-Administered Medications: None       Past Medical History:   Diagnosis Date    Acid reflux     Anxiety     RESOLVED: [id number]    Arthritis     Bipolar 2 disorder (HCC)     FOLLOWS WITH PSYCHIATRIST  CONTINUE LAMOTRIGINE; RESOLVED: [id number]    Depression     Familial tremor     both hands    Fibromyalgia     LAST ASSESSED: [id number]    Hearing aid worn     left ear    [place] (hard of hearing)     left ear    Hypertension     Left-sided weakness     Lower back pain     Memory loss of unknown cause     long and short term    Migraine     Obesity     Obesity, Class II, BMI 35-39 9     Overactive bladder     Panic attack     Post traumatic stress disorder     Seasonal allergies     Stroke 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    CONT ASA 81 MG DAILY; RESOLVED: [id number]    Urinary incontinence     Wears dentures     partial lower / full upper    Wears glasses        Past Surgical History:   Procedure Laterality Date    BACK SURGERY       SECTION      COLONOSCOPY      RESOLVED: [id number]    EAR SURGERY      EGD      HYSTERECTOMY      MYRINGOTOMY W/ TUBES Left     NECK SURGERY  2019    AZ CYSTOURETHROSCOPY N/A 2016    Procedure: CYSTOSCOPY, BOTOX INJECTION;  Surgeon: Johanna Olivier MD;  Location: [facility];  Service: Gynecology    AZ IMPLANT SPINAL NEUROSTIM/ Right 2/10/2021    Procedure: REPLACEMENT IMPLANTABLE PULSE GENERATOR DORSAL SPINAL COLUMN STIMULATOR, RIGHT;  Surgeon: Christian Griffiths MD;  Location:  MAIN OR;  Service: Neurosurgery    AZ PERCUT IMPLNT [address] Right 2020    Procedure: INSERTION THORACIC DORSAL COLUMN SPINAL CORD STIMULATOR PERCUTANEOUS W IMPLANTABLE PULSE GENERATOR, RIGHT;  Surgeon: Christian Griffiths MD;  Location:  MAIN OR;  Service: Neurosurgery    TONSILLECTOMY      TUBAL LIGATION  1986    UPPER GASTROINTESTINAL ENDOSCOPY  09/2020       Family History   Problem Relation Age of Onset    Colon cancer Mother     Alzheimer's disease Father     Stroke Father     Colon cancer Brother     Bipolar disorder Brother     Breast cancer Maternal Aunt     Colon cancer Maternal Aunt     Heart disease Other     Diabetes Other     Hypertension Other     Seizures Son     Depression Paternal Grandfather     No Known Problems Sister     No Known Problems Brother     Thyroid disease Neg Hx      I have reviewed and agree with the history as documented  E-Cigarette/Vaping    E-Cigarette Use Never User      E-Cigarette/Vaping Substances    Nicotine No     THC No     CBD No     Flavoring No     Other No     Unknown No      Social History     Tobacco Use    Smoking status: Never Smoker    Smokeless tobacco: Never Used   Vaping Use    Vaping Use: Never used   Substance Use Topics    Alcohol use: Not Currently     Comment: 2 x year; being a social drinker as per all scripts     Drug use: No        Review of Systems   Constitutional: Negative  HENT: Negative  Eyes: Negative  Respiratory: Positive for shortness of breath  Cardiovascular: Negative  Gastrointestinal: Negative  Endocrine: Negative  Genitourinary: Negative  Musculoskeletal: Positive for back pain  Skin: Negative  Neurological: Positive for numbness  Negative for dizziness, tremors, seizures, syncope, facial asymmetry, speech difficulty, weakness, light-headedness and headaches  Numbness L anterior thigh   Psychiatric/Behavioral: Negative for agitation, confusion, hallucinations, self-injury, sleep disturbance and suicidal ideas  The patient is nervous/anxious          Physical Exam  ED Triage Vitals [08/16/21 1456]   Temperature Pulse Respirations Blood Pressure SpO2   99 9 °F (37 7 °C) 83 20 167/77 96 %      Temp Source Heart Rate Source Patient Position - Orthostatic VS BP Location FiO2 (%)   Oral -- -- -- --      Pain Score       --             Orthostatic Vital Signs  Vitals:    08/16/21 1456   BP: 167/77   Pulse: 83       Physical Exam  Vitals and nursing note reviewed  Constitutional:       General: She is in acute distress  Appearance: Normal appearance  She is not ill-appearing, toxic-appearing or diaphoretic  Comments: Patient lying on gurney clutching both hand rails  Rapid blinking and mild shaking of both upper extremities  Appears anxious and slightly tremulous  Speech has a quivering quality with occasional stuttering  HENT:      Head: Normocephalic and atraumatic  Right Ear: External ear normal       Left Ear: External ear normal       Nose: Nose normal       Mouth/Throat:      Mouth: Mucous membranes are moist       Pharynx: Oropharynx is clear  Eyes:      General:         Right eye: No discharge  Left eye: No discharge  Extraocular Movements: Extraocular movements intact  Cardiovascular:      Rate and Rhythm: Normal rate and regular rhythm  Pulses: Normal pulses  Heart sounds: Normal heart sounds  No murmur heard  No friction rub  Pulmonary:      Effort: Pulmonary effort is normal  No respiratory distress  Breath sounds: Normal breath sounds  No wheezing or rales  Comments: Tachypneic, speaking in full sentences, no increased work of breathing  Abdominal:      General: Abdomen is flat  Bowel sounds are normal  There is no distension  Palpations: Abdomen is soft  Tenderness: There is no abdominal tenderness  There is no guarding  Musculoskeletal:         General: Tenderness present  Normal range of motion  Cervical back: Normal range of motion and neck supple  No rigidity or tenderness  Right lower leg: No edema  Left lower leg: No edema        Comments: Left ankle, lateral left lower extremity   Skin:     General: Skin is warm and dry  Capillary Refill: Capillary refill takes less than 2 seconds  Coloration: Skin is not pale  Neurological:      Mental Status: She is alert and oriented to person, place, and time  Cranial Nerves: No cranial nerve deficit  Sensory: Sensory deficit present  Motor: No weakness  Gait: Gait normal       Comments: Decreased sensation Right anterior thigh, able to ambulate with a walker   Psychiatric:         Attention and Perception: Attention normal  She is attentive  Mood and Affect: Mood is anxious  Behavior: Behavior normal  Behavior is not agitated, slowed, aggressive or combative  Behavior is cooperative  Thought Content: Thought content normal          Cognition and Memory: Cognition normal          Judgment: Judgment normal          ED Medications  Medications   LORazepam (ATIVAN) injection 0 5 mg (0 5 mg Intravenous Given 8/16/21 1606)       Diagnostic Studies  Results Reviewed     Procedure Component Value Units Date/Time    D-dimer, quantitative [[id number]]  (Normal) Collected: 08/16/21 1603    Lab Status: Final result Specimen: Blood from Arm, Right Updated: 08/16/21 1633     D-Dimer, Quant 0 38 ug/ml FEU     [place] draw [[id number]] Collected: 08/16/21 1603    Lab Status: In process Specimen: Blood from Arm, Right Updated: 08/16/21 1610    Narrative: The following orders were created for panel order [place] draw    Procedure                               Abnormality         Status                     ---------                               -----------         ------                     Ramakrishna Adonis Top on EEXY[[id number]]                                                      Gold top on [employer]                                                            Green / Yellow tube on DZBT[[id number]]                      In process                 Green / Black tube on IDPA[[id number]]                       In process                 Lavender Top 3 ml on hold[[id number]]                        In process                 Lavender Top 7ml on HMKR[[id number]]                         In process                   Please view results for these tests on the individual orders  No orders to display         Procedures  Procedures      ED Course  ED Course as of Aug 16 1714   Mon Aug 16, 2021   1557 Initial impression:  Tachypnea due to ongoing panic attack  Low risk for PE   D-dimer for increased respiration rate and recent 2 week immobilization  0 5 mg IM Ativan for anxiety  Will reassess after intervention  1633 D-dimer within normal limits  Physical exam improved after Ativan  Patient appears more calm, speech is less pressured, less tremulous  Pt states she feels better  1706 Pt observed ambulating with walker  No longer feeling anxious  VS normal  Agrees to be discharged home  Has follow up with family doctor tomorrow at 2:00 pm                                 SBIRT 20yo+      Most Recent Value   SBIRT (22 yo +)   In order to provide better care to our patients, we are screening all of our patients for alcohol and drug use  Would it be okay to ask you these screening questions? Unable to answer at this time Filed at: 08/16/2021 3502                MDM  Number of Diagnoses or Management Options     Amount and/or Complexity of Data Reviewed  Clinical lab tests: ordered  Review and summarize past medical records: yes        Disposition  Final diagnoses:   Panic attack     Time reflects when diagnosis was documented in both MDM as applicable and the Disposition within this note     Time User Action Codes Description Comment    8/16/2021  5:09 PM Adali Díaz Add [F41 0] Panic attack       ED Disposition     ED Disposition Condition Date/Time Comment    Discharge Stable Mon Aug 16, 2021  5:09 PM Samantha Rodriguez discharge to home/self care              Follow-up Information    None         Patient's Medications   Discharge Prescriptions No medications on file     No discharge procedures on file  PDMP Review       Value Time User    PDMP Reviewed  Yes 7/13/2021  3:49 PM Alyssa Avelar DO           ED Provider  Attending physically available and evaluated Samantha Rodriguez I managed the patient along with the ED Attending      Electronically Signed by         Lokesh Gutierrez MD  08/16/21 7030

## 2021-08-16 NOTE — ED ATTENDING ATTESTATION
8/16/2021  I, Rachel Fernando MD, saw and evaluated the patient  I have discussed the patient with the resident/non-physician practitioner and agree with the resident's/non-physician practitioner's findings, Plan of Care, and MDM as documented in the resident's/non-physician practitioner's note, except where noted  All available labs and Radiology studies were reviewed  I was present for key portions of any procedure(s) performed by the resident/non-physician practitioner and I was immediately available to provide assistance  At this point I agree with the current assessment done in the Emergency Department  I have conducted an independent evaluation of this patient a history and physical is as follows:    ED Course      Patient presents for evaluation secondary to having anxiety and panic attacks  Patient called her psychiatrist this morning who recommended coming to the ED  Denies HI/SI/AH/VH  Recently hospitalized for anxiety  Patient also reporting some calf pain  Patient has been in wheelchair for several weeks due to sciatica  No additional complaints  A/P: Anxiety, calf pain  Will treat with ativan and check DDimer  Will reassess       Critical Care Time  Procedures

## 2021-08-17 ENCOUNTER — PATIENT OUTREACH (OUTPATIENT)
Dept: INTERNAL MEDICINE CLINIC | Facility: CLINIC | Age: 56
End: 2021-08-17

## 2021-08-17 ENCOUNTER — OFFICE VISIT (OUTPATIENT)
Dept: INTERNAL MEDICINE CLINIC | Facility: CLINIC | Age: 56
End: 2021-08-17

## 2021-08-17 ENCOUNTER — HOSPITAL ENCOUNTER (INPATIENT)
Facility: HOSPITAL | Age: 56
LOS: 34 days | Discharge: NURSING, REHAB OR LONG-TERM CARE | DRG: 556 | End: 2021-09-23
Attending: EMERGENCY MEDICINE | Admitting: INTERNAL MEDICINE
Payer: COMMERCIAL

## 2021-08-17 VITALS
WEIGHT: 195.8 LBS | OXYGEN SATURATION: 98 % | BODY MASS INDEX: 37 KG/M2 | DIASTOLIC BLOOD PRESSURE: 90 MMHG | TEMPERATURE: 97.9 F | SYSTOLIC BLOOD PRESSURE: 133 MMHG | HEART RATE: 66 BPM

## 2021-08-17 DIAGNOSIS — N39.41 URGE INCONTINENCE OF URINE: ICD-10-CM

## 2021-08-17 DIAGNOSIS — M54.9 CHRONIC BACK PAIN: ICD-10-CM

## 2021-08-17 DIAGNOSIS — M54.42 CHRONIC BILATERAL LOW BACK PAIN WITH BILATERAL SCIATICA: ICD-10-CM

## 2021-08-17 DIAGNOSIS — F41.0 PANIC DISORDER WITHOUT AGORAPHOBIA: ICD-10-CM

## 2021-08-17 DIAGNOSIS — M16.0 PRIMARY OSTEOARTHRITIS OF BOTH HIPS: Primary | ICD-10-CM

## 2021-08-17 DIAGNOSIS — F43.10 POST TRAUMATIC STRESS DISORDER: ICD-10-CM

## 2021-08-17 DIAGNOSIS — F31.81 BIPOLAR II DISORDER (HCC): ICD-10-CM

## 2021-08-17 DIAGNOSIS — F41.9 ANXIETY: ICD-10-CM

## 2021-08-17 DIAGNOSIS — M54.41 CHRONIC BILATERAL LOW BACK PAIN WITH BILATERAL SCIATICA: ICD-10-CM

## 2021-08-17 DIAGNOSIS — G89.29 CHRONIC LEG PAIN: Primary | ICD-10-CM

## 2021-08-17 DIAGNOSIS — G89.29 CHRONIC BACK PAIN: ICD-10-CM

## 2021-08-17 DIAGNOSIS — Z99.3 WHEELCHAIR BOUND: ICD-10-CM

## 2021-08-17 DIAGNOSIS — G89.29 CHRONIC BILATERAL LOW BACK PAIN WITH BILATERAL SCIATICA: ICD-10-CM

## 2021-08-17 DIAGNOSIS — G24.01 TARDIVE DYSKINESIA: ICD-10-CM

## 2021-08-17 DIAGNOSIS — M79.606 CHRONIC LEG PAIN: Primary | ICD-10-CM

## 2021-08-17 PROCEDURE — 99213 OFFICE O/P EST LOW 20 MIN: CPT | Performed by: INTERNAL MEDICINE

## 2021-08-17 PROCEDURE — 99284 EMERGENCY DEPT VISIT MOD MDM: CPT

## 2021-08-17 PROCEDURE — 3080F DIAST BP >= 90 MM HG: CPT | Performed by: INTERNAL MEDICINE

## 2021-08-17 PROCEDURE — 1036F TOBACCO NON-USER: CPT | Performed by: INTERNAL MEDICINE

## 2021-08-17 PROCEDURE — 99285 EMERGENCY DEPT VISIT HI MDM: CPT | Performed by: EMERGENCY MEDICINE

## 2021-08-17 PROCEDURE — 3075F SYST BP GE 130 - 139MM HG: CPT | Performed by: INTERNAL MEDICINE

## 2021-08-17 RX ORDER — DIAZEPAM 5 MG/1
5 TABLET ORAL ONCE
Status: COMPLETED | OUTPATIENT
Start: 2021-08-17 | End: 2021-08-17

## 2021-08-17 RX ADMIN — DIAZEPAM 5 MG: 5 TABLET ORAL at 23:29

## 2021-08-17 NOTE — PROGRESS NOTES
ASSESSMENT/PLAN:  Diagnoses and all orders for this visit:    Primary osteoarthritis of both hips  -     Ambulatory Referral to [address] [place] [address] Alfredo Ernst [facility]; Future    Urge incontinence of urine  -     Diapers & Supplies (Huggies Pull-Ups) MISC; Use 3 (three) times a day    Wheelchair bound  -     Ambulatory Referral to [address] [place] [address] Alfredo Figueroa; Future  - Patient is unable to ambulate without assistance of a walker and requires assistance of her son  Patient would greatly benefit from physical therapy  Patient is unable to travel to appointments due to being wheelchair bound and she is unable to climb down steps due to her weakness - again she would greatly benefit from home PT  Health Maintenance:  Advised diet and exercise  Advised to refrain from tobacco, alcohol, illicit drug use  Advised medical compliance  CHIEF COMPLAINT: Follow up    HISTORY OF PRESENT ILLNESS:    Patient is a 60year old female coming in for follow up of chronic conditions  Patient was seen in the ED yesterday due to a panic attack  Her son stated that she had a panic attack due to him wanting to leave the house without her  Her son also expressed interest in finding her a SNF as he is unable to take care of her and her needs; he is also returning to work soon and she is unable to stay home alone  She is also complaining of a tremor that has been present for quite sometime  This tremor worsens with movement per the patient  She had lower extremity pain and weakness R>L that has been present for some time - she is unable to go up and down the stairs due to this and is unable to walk           The following portions of the patient's history were reviewed and updated as appropriate: allergies, current medications, past family history, past medical history, past social history, past surgical history and problem list     12 point system ROS reviewed and negative unless stated otherwise above    OBJECTIVE:  Vitals:    08/17/21 1444   BP: 133/90   BP Location: Right arm   Patient Position: Sitting   Cuff Size: Standard   Pulse: 66   Temp: 97 9 °F (36 6 °C)   TempSrc: Temporal   SpO2: 98%   Weight: 88 8 kg (195 lb 12 8 oz)     Physical Exam  Constitutional:       Appearance: Normal appearance  HENT:      Head: Normocephalic and atraumatic  Nose: Nose normal       Mouth/Throat:      Mouth: Mucous membranes are moist       Pharynx: Oropharynx is clear  Eyes:      Extraocular Movements: Extraocular movements intact  Conjunctiva/sclera: Conjunctivae normal    Cardiovascular:      Rate and Rhythm: Normal rate and regular rhythm  Heart sounds: No murmur heard  No friction rub  No gallop  Pulmonary:      Effort: Pulmonary effort is normal  No respiratory distress  Breath sounds: Normal breath sounds  No stridor  No wheezing, rhonchi or rales  Chest:      Chest wall: No tenderness  Abdominal:      General: Bowel sounds are normal  There is no distension  Palpations: Abdomen is soft  There is no mass  Tenderness: There is no abdominal tenderness  There is no guarding or rebound  Hernia: No hernia is present  Musculoskeletal:      Cervical back: Normal range of motion  Comments: Weakness through out  However, B/L lower extremity weakness more pronounced  Upper extremities with slight tremor   Skin:     General: Skin is warm and dry  Neurological:      Mental Status: She is alert     Psychiatric:         Mood and Affect: Mood normal            Current Outpatient Medications:     amLODIPine (NORVASC) 5 mg tablet, Take 1 tablet (5 mg total) by mouth daily, Disp: 90 tablet, Rfl: 3    atorvastatin (LIPITOR) 40 mg tablet, TAKE 1 TABLET BY MOUTH DAILY WITH DINNER, Disp: 30 tablet, Rfl: 0    busPIRone (BUSPAR) 10 mg tablet, TAKE 2 TABLETS (20 MG TOTAL) BY MOUTH 3 (THREE) TIMES A DAY , Disp: , Rfl:     [facility] Aspirin Adult Low Dose 81 MG chewable tablet, CHEW 1 TABLET BY MOUTH DAILY, Disp: 30 tablet, Rfl: 0    [facility]S Vitamin C 500 MG tablet, TAKE 1 TABLET BY MOUTH TWICE A DAY, Disp: 60 tablet, Rfl: 0    Diapers & Supplies (Huggies Pull-Ups) MISC, Use 3 (three) times a day, Disp: 100 each, Rfl: 3    Diclofenac Sodium (VOLTAREN) 1 %, Apply 2 g topically 4 (four) times a day, Disp: 150 g, Rfl: 0    ferrous sulfate 324 (65 Fe) mg, TAKE 1 TABLET (324 MG TOTAL) BY MOUTH 2 (TWO) TIMES A DAY BEFORE MEALS, Disp: 60 tablet, Rfl: 1    folic acid (FOLVITE) 1 mg tablet, TAKE 1 TABLET BY MOUTH EVERY DAY, Disp: 30 tablet, Rfl: 1    hydrOXYzine HCL (ATARAX) 50 mg tablet, Take 50 mg by mouth 3 (three) times a day as needed for itching, Disp: , Rfl:     lithium carbonate (LITHOBID) 300 mg CR tablet, Take 1 tablet (300 mg total) by mouth daily at bedtime, Disp: 30 tablet, Rfl: 0    mirtazapine (REMERON) 7 5 MG tablet, Take 1 tablet (7 5 mg total) by mouth daily at bedtime as needed (insomnia), Disp: 30 tablet, Rfl: 0    naloxone (NARCAN) 4 mg/0 1 mL nasal spray, Administer 1 spray into a nostril   If no response after 2-3 minutes, give another dose in the other nostril using a new spray , Disp: 1 each, Rfl: 1    pantoprazole (PROTONIX) 20 mg tablet, TAKE 1 TABLET (20 MG TOTAL) BY MOUTH DAILY IN THE EARLY MORNING, Disp: 30 tablet, Rfl: 0    PARoxetine (PAXIL) 30 mg tablet, Take 2 tablets (60 mg total) by mouth daily, Disp: 60 tablet, Rfl: 0    potassium chloride (K-DUR,KLOR-CON) 20 mEq tablet, Take 1 tablet (20 mEq total) by mouth daily, Disp: 30 tablet, Rfl: 0    prazosin (MINIPRESS) 2 mg capsule, TAKE 1 CAPSULE BY MOUTH EVERY DAY, Disp: 30 capsule, Rfl: 1    pregabalin (LYRICA) 100 mg capsule, Take 1 capsule (100 mg total) by mouth 2 (two) times a day, Disp: 60 capsule, Rfl: 1    propranolol (INDERAL) 20 mg tablet, Take 1 tablet (20 mg total) by mouth 2 (two) times a day before breakfast and lunch, Disp: 90 tablet, Rfl: 0    QUEtiapine (SEROquel) 25 mg tablet, Take 2 tablets (50 mg total) by mouth 4 (four) times daily (after meals and at bedtime), Disp: 120 tablet, Rfl: 0    senna-docusate sodium (SENOKOT S) 8 6-50 mg per tablet, Take 1 tablet by mouth daily as needed for constipation, Disp: 30 tablet, Rfl: 0    Valbenazine Tosylate (Ingrezza) 80 MG CAPS, Take 80 mg by mouth daily, Disp: 30 capsule, Rfl: 1    busPIRone (BUSPAR) 15 mg tablet, Take 1 tablet (15 mg total) by mouth 3 (three) times a day (Patient not taking: Reported on 2021), Disp: 90 tablet, Rfl: 0    Respiratory Therapy Supplies (Nebulizer) YUDY, Use as needed (Shortness of breath) (Patient not taking: Reported on 2021), Disp: 1 each, Rfl: 0  No current facility-administered medications for this visit  Past Medical History:   Diagnosis Date    Acid reflux     Anxiety     RESOLVED: [id number]    Arthritis     Bipolar 2 disorder (HCC)     FOLLOWS WITH PSYCHIATRIST  CONTINUE LAMOTRIGINE; RESOLVED: [id number]    Depression     Familial tremor     both hands    Fibromyalgia     LAST ASSESSED: [id number]    Hearing aid worn     left ear    [place] (hard of hearing)     left ear    Hypertension     Left-sided weakness     Lower back pain     Memory loss of unknown cause     long and short term    Migraine     Obesity     Obesity, Class II, BMI 35-39 9     Overactive bladder     Panic attack     Post traumatic stress disorder     Seasonal allergies     Stroke [facility])     questionable stroke 2009    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    CONT ASA 81 MG DAILY; RESOLVED: [id number]    Urinary incontinence     Wears dentures     partial lower / full upper    Wears glasses      Past Surgical History:   Procedure Laterality Date    BACK SURGERY       SECTION      COLONOSCOPY      RESOLVED: [id number]    EAR SURGERY      EGD      HYSTERECTOMY      MYRINGOTOMY W/ TUBES Left     NECK SURGERY  2019    VA CYSTOURETHROSCOPY N/A 2016    Procedure: CYSTOSCOPY, BOTOX INJECTION;  Surgeon: Sherri Hernandez MD;  Location: [facility]; Service: Gynecology    SD IMPLANT SPINAL NEUROSTIM/ Right 2/10/2021    Procedure: REPLACEMENT IMPLANTABLE PULSE GENERATOR DORSAL SPINAL COLUMN STIMULATOR, RIGHT;  Surgeon: Christian Griffiths MD;  [facility];  Service: Neurosurgery    SD PERCUT IMPLNT Mateo Gallegos [address] Right 7/28/2020    Procedure: INSERTION THORACIC DORSAL COLUMN SPINAL CORD STIMULATOR PERCUTANEOUS W IMPLANTABLE PULSE GENERATOR, RIGHT;  Surgeon: Christian Griffiths MD;  Location[facility];  Service: Neurosurgery    TONSILLECTOMY      TUBAL LIGATION  1986    UPPER GASTROINTESTINAL ENDOSCOPY  09/2020     Social History     Socioeconomic History    Marital status:      Spouse name: Not on file    Number of children: 2    Years of education: graduate school     Highest education level: Not on file   Occupational History    Occupation: Disabled   Tobacco Use    Smoking status: Never Smoker    Smokeless tobacco: Never Used   Vaping Use    Vaping Use: Never used   Substance and Sexual Activity    Alcohol use: Not Currently     Comment: 2 x year; being a social drinker as per all scripts     Drug use: No    Sexual activity: Not Currently   Other Topics Concern    Not on file   Social History Narrative    Bereavement    Daily caffeine consumption, 6-8 servings per day     as per all scripts    Lives in [address] [place] [address] Determinants of Health     Financial Resource Strain: Medium Risk    Difficulty of Paying Living Expenses: Somewhat hard   Food Insecurity: No Food Insecurity    Worried About [address] in the Last Year: Never true    [facility] in the Last Year: Never true   Transportation Needs: No Transportation Needs    Lack of Transportation (Medical): No    Lack of Transportation (Non-Medical): No   Physical Activity: Inactive    Days of Exercise per Week: 0 days    Minutes of Exercise per Session: 0 min   Stress:     Feeling of Stress :    Social Connections:  Moderately Isolated  Frequency of Communication with Friends and Family: More than three times a week    Frequency of Social Gatherings with Friends and Family: More than three times a week    Attends Anabaptism Services: More than 4 times per year    Active Member of [employer] or Organizations: No    Attends Club or Organization Meetings: Never    Marital Status:    Intimate Partner Violence: Not At Risk    Fear of Current or Ex-Partner: No    Emotionally Abused: No    Physically Abused: No    Sexually Abused: No     Family History   Problem Relation Age of Onset    Colon cancer Mother     Alzheimer's disease Father     Stroke Father     Colon cancer Brother     Bipolar disorder Brother     Breast cancer Maternal Aunt     Colon cancer Maternal Aunt     Heart disease Other     Diabetes Other     Hypertension Other     Seizures Son     Depression Paternal Grandfather     No Known Problems Sister     No Known Problems Brother     Thyroid disease Neg Hx        ==  MD Marilou Rankin 73 Internal Medicine PGY-3    [place] [address]  [address]   [facility] , [address] [address] [facility] [address] [facility]  Office: [phone]  Fax: [phone]

## 2021-08-17 NOTE — PROGRESS NOTES
Outpatient Care Management Note:    Patient is at PCP office today with her son Mira Zamora  Patient presents in a wheelchair  Provider came to me after visit and is stating that patient is requesting to be placed in a facility  No  on site today  I went to speak with patient and son  Son is to return to work tomorrow 8/18/21 after taking a leave to help care for his mom  He reports he has bills that are overdue and rent that was recently increased and must return  Patient has daughter in the area but unable to do steps there as living space would be on 2nd floor  Son also reports that she has 4 children and is pregnant and will be home schooling all the children  Patient is stating that she does not feel she will be able to manage at her son's place by herself when he is at work  She reports decreased physical mobility and worried about falling  She also reports that her anxiety is going to be increased causing her to have panic attacks  Son also reports patient using walker and wheelchair at home and not enough room to use wheelchair and patient unable to self propel wheelchair due to decreased strength  PA waiver was being pursued and patient wants her son to be the paid caregiver  Son is now stating that if the pay is less than want he currently is getting paid now he will not be able to do the job  Patient unsure about having aides she does not know from a home care agency helping her  From chart review patient was approved for PA waiver and waiting on a  to be assigned  I did explain process in looking in nursing homes for short term rehab with possibility of long term placement and realistic timeline of finding an accepting facility in the outpatient setting  Patient adamant she does not want [facility] and was there in the past  Unable to give me any facilities she would be interested in going to   Patient and son do not feel there is time and would like placement right away  I did state that if patient's safety is a concern than advised to go back to hospital and request for short term rehab placement with son reporting he must return to work tomorrow and patient will be home alone  Patient to present to emergency room later today  Provider seeing patient today made aware  Son and patient requesting I give update to  Cayla Carrera when she returns next week  Per son plan for near future is for son and daughter to find a multi unit place that they can all stay and patient to live with them with PA waiver help

## 2021-08-18 ENCOUNTER — PATIENT OUTREACH (OUTPATIENT)
Dept: INTERNAL MEDICINE CLINIC | Facility: CLINIC | Age: 56
End: 2021-08-18

## 2021-08-18 LAB
ALBUMIN SERPL BCP-MCNC: 3.6 G/DL (ref 3.5–5)
ALP SERPL-CCNC: 104 U/L (ref 46–116)
ALT SERPL W P-5'-P-CCNC: 18 U/L (ref 12–78)
ANION GAP SERPL CALCULATED.3IONS-SCNC: 5 MMOL/L (ref 4–13)
AST SERPL W P-5'-P-CCNC: 12 U/L (ref 5–45)
BASOPHILS # BLD AUTO: 0.03 THOUSANDS/ΜL (ref 0–0.1)
BASOPHILS NFR BLD AUTO: 1 % (ref 0–1)
BILIRUB SERPL-MCNC: 1.16 MG/DL (ref 0.2–1)
BUN SERPL-MCNC: 8 MG/DL (ref 5–25)
CALCIUM SERPL-MCNC: 8.8 MG/DL (ref 8.3–10.1)
CHLORIDE SERPL-SCNC: 110 MMOL/L (ref 100–108)
CO2 SERPL-SCNC: 27 MMOL/L (ref 21–32)
CREAT SERPL-MCNC: 0.89 MG/DL (ref 0.6–1.3)
EOSINOPHIL # BLD AUTO: 0.13 THOUSAND/ΜL (ref 0–0.61)
EOSINOPHIL NFR BLD AUTO: 3 % (ref 0–6)
ERYTHROCYTE [DISTWIDTH] IN BLOOD BY AUTOMATED COUNT: 13.8 % (ref 11.6–15.1)
GFR SERPL CREATININE-BSD FRML MDRD: 83 ML/MIN/1.73SQ M
GLUCOSE SERPL-MCNC: 103 MG/DL (ref 65–140)
HCT VFR BLD AUTO: 39.3 % (ref 34.8–46.1)
HGB BLD-MCNC: 13.1 G/DL (ref 11.5–15.4)
IMM GRANULOCYTES # BLD AUTO: 0.01 THOUSAND/UL (ref 0–0.2)
IMM GRANULOCYTES NFR BLD AUTO: 0 % (ref 0–2)
LYMPHOCYTES # BLD AUTO: 1.25 THOUSANDS/ΜL (ref 0.6–4.47)
LYMPHOCYTES NFR BLD AUTO: 24 % (ref 14–44)
MCH RBC QN AUTO: 30 PG (ref 26.8–34.3)
MCHC RBC AUTO-ENTMCNC: 33.3 G/DL (ref 31.4–37.4)
MCV RBC AUTO: 90 FL (ref 82–98)
MONOCYTES # BLD AUTO: 0.5 THOUSAND/ΜL (ref 0.17–1.22)
MONOCYTES NFR BLD AUTO: 10 % (ref 4–12)
NEUTROPHILS # BLD AUTO: 3.24 THOUSANDS/ΜL (ref 1.85–7.62)
NEUTS SEG NFR BLD AUTO: 62 % (ref 43–75)
NRBC BLD AUTO-RTO: 0 /100 WBCS
PLATELET # BLD AUTO: 257 THOUSANDS/UL (ref 149–390)
PMV BLD AUTO: 9.6 FL (ref 8.9–12.7)
POTASSIUM SERPL-SCNC: 3.1 MMOL/L (ref 3.5–5.3)
PROT SERPL-MCNC: 6.9 G/DL (ref 6.4–8.2)
RBC # BLD AUTO: 4.36 MILLION/UL (ref 3.81–5.12)
SODIUM SERPL-SCNC: 142 MMOL/L (ref 136–145)
WBC # BLD AUTO: 5.16 THOUSAND/UL (ref 4.31–10.16)

## 2021-08-18 PROCEDURE — 80053 COMPREHEN METABOLIC PANEL: CPT | Performed by: STUDENT IN AN ORGANIZED HEALTH CARE EDUCATION/TRAINING PROGRAM

## 2021-08-18 PROCEDURE — 99218 PR INITIAL OBSERVATION CARE/DAY 30 MINUTES: CPT | Performed by: INTERNAL MEDICINE

## 2021-08-18 PROCEDURE — 85025 COMPLETE CBC W/AUTO DIFF WBC: CPT | Performed by: STUDENT IN AN ORGANIZED HEALTH CARE EDUCATION/TRAINING PROGRAM

## 2021-08-18 PROCEDURE — 36415 COLL VENOUS BLD VENIPUNCTURE: CPT | Performed by: STUDENT IN AN ORGANIZED HEALTH CARE EDUCATION/TRAINING PROGRAM

## 2021-08-18 PROCEDURE — 97167 OT EVAL HIGH COMPLEX 60 MIN: CPT

## 2021-08-18 PROCEDURE — 97163 PT EVAL HIGH COMPLEX 45 MIN: CPT

## 2021-08-18 RX ORDER — PROPRANOLOL HYDROCHLORIDE 20 MG/1
20 TABLET ORAL
Status: DISCONTINUED | OUTPATIENT
Start: 2021-08-18 | End: 2021-09-23 | Stop reason: HOSPADM

## 2021-08-18 RX ORDER — AMOXICILLIN 250 MG
1 CAPSULE ORAL DAILY PRN
Status: DISCONTINUED | OUTPATIENT
Start: 2021-08-18 | End: 2021-09-23 | Stop reason: HOSPADM

## 2021-08-18 RX ORDER — ASCORBIC ACID 500 MG
500 TABLET ORAL 2 TIMES DAILY
Status: DISCONTINUED | OUTPATIENT
Start: 2021-08-18 | End: 2021-09-23 | Stop reason: HOSPADM

## 2021-08-18 RX ORDER — MIRTAZAPINE 15 MG/1
7.5 TABLET, FILM COATED ORAL
Status: DISCONTINUED | OUTPATIENT
Start: 2021-08-18 | End: 2021-09-23 | Stop reason: HOSPADM

## 2021-08-18 RX ORDER — QUETIAPINE FUMARATE 25 MG/1
50 TABLET, FILM COATED ORAL
Status: DISCONTINUED | OUTPATIENT
Start: 2021-08-18 | End: 2021-09-23 | Stop reason: HOSPADM

## 2021-08-18 RX ORDER — PAROXETINE HYDROCHLORIDE 20 MG/1
60 TABLET, FILM COATED ORAL DAILY
Status: DISCONTINUED | OUTPATIENT
Start: 2021-08-18 | End: 2021-09-23 | Stop reason: HOSPADM

## 2021-08-18 RX ORDER — HYDROXYZINE HYDROCHLORIDE 25 MG/1
50 TABLET, FILM COATED ORAL 3 TIMES DAILY PRN
Status: DISCONTINUED | OUTPATIENT
Start: 2021-08-18 | End: 2021-09-23 | Stop reason: HOSPADM

## 2021-08-18 RX ORDER — FERROUS SULFATE 325(65) MG
325 TABLET ORAL
Status: DISCONTINUED | OUTPATIENT
Start: 2021-08-18 | End: 2021-09-23 | Stop reason: HOSPADM

## 2021-08-18 RX ORDER — PANTOPRAZOLE SODIUM 20 MG/1
20 TABLET, DELAYED RELEASE ORAL
Status: DISCONTINUED | OUTPATIENT
Start: 2021-08-18 | End: 2021-09-23 | Stop reason: HOSPADM

## 2021-08-18 RX ORDER — ACETAMINOPHEN 325 MG/1
650 TABLET ORAL EVERY 8 HOURS SCHEDULED
Status: DISCONTINUED | OUTPATIENT
Start: 2021-08-18 | End: 2021-09-23 | Stop reason: HOSPADM

## 2021-08-18 RX ORDER — PRAZOSIN HYDROCHLORIDE 2 MG/1
2 CAPSULE ORAL DAILY
Status: DISCONTINUED | OUTPATIENT
Start: 2021-08-18 | End: 2021-09-23 | Stop reason: HOSPADM

## 2021-08-18 RX ORDER — LITHIUM CARBONATE 300 MG/1
300 TABLET, FILM COATED, EXTENDED RELEASE ORAL
Status: DISCONTINUED | OUTPATIENT
Start: 2021-08-18 | End: 2021-09-23 | Stop reason: HOSPADM

## 2021-08-18 RX ORDER — MORPHINE SULFATE 15 MG/1
15 TABLET, FILM COATED, EXTENDED RELEASE ORAL ONCE
Status: COMPLETED | OUTPATIENT
Start: 2021-08-18 | End: 2021-08-18

## 2021-08-18 RX ORDER — POTASSIUM CHLORIDE 20 MEQ/1
40 TABLET, EXTENDED RELEASE ORAL 2 TIMES DAILY
Status: COMPLETED | OUTPATIENT
Start: 2021-08-18 | End: 2021-08-18

## 2021-08-18 RX ORDER — FOLIC ACID 1 MG/1
1000 TABLET ORAL DAILY
Status: DISCONTINUED | OUTPATIENT
Start: 2021-08-18 | End: 2021-09-23 | Stop reason: HOSPADM

## 2021-08-18 RX ORDER — POTASSIUM CHLORIDE 20 MEQ/1
20 TABLET, EXTENDED RELEASE ORAL
Status: DISCONTINUED | OUTPATIENT
Start: 2021-08-18 | End: 2021-08-18

## 2021-08-18 RX ORDER — ASPIRIN 81 MG/1
81 TABLET, CHEWABLE ORAL DAILY
Status: DISCONTINUED | OUTPATIENT
Start: 2021-08-18 | End: 2021-09-23 | Stop reason: HOSPADM

## 2021-08-18 RX ORDER — AMLODIPINE BESYLATE 5 MG/1
5 TABLET ORAL DAILY
Status: DISCONTINUED | OUTPATIENT
Start: 2021-08-18 | End: 2021-09-23 | Stop reason: HOSPADM

## 2021-08-18 RX ORDER — ATORVASTATIN CALCIUM 40 MG/1
40 TABLET, FILM COATED ORAL
Status: DISCONTINUED | OUTPATIENT
Start: 2021-08-18 | End: 2021-09-23 | Stop reason: HOSPADM

## 2021-08-18 RX ORDER — LORAZEPAM 0.5 MG/1
0.5 TABLET ORAL EVERY 8 HOURS PRN
Status: DISCONTINUED | OUTPATIENT
Start: 2021-08-18 | End: 2021-09-23 | Stop reason: HOSPADM

## 2021-08-18 RX ORDER — KETOROLAC TROMETHAMINE 30 MG/ML
15 INJECTION, SOLUTION INTRAMUSCULAR; INTRAVENOUS EVERY 6 HOURS PRN
Status: DISPENSED | OUTPATIENT
Start: 2021-08-18 | End: 2021-08-20

## 2021-08-18 RX ORDER — LIDOCAINE 50 MG/G
1 PATCH TOPICAL DAILY
Status: DISCONTINUED | OUTPATIENT
Start: 2021-08-18 | End: 2021-09-23 | Stop reason: HOSPADM

## 2021-08-18 RX ORDER — ONDANSETRON 4 MG/1
4 TABLET, ORALLY DISINTEGRATING ORAL EVERY 6 HOURS PRN
Status: DISCONTINUED | OUTPATIENT
Start: 2021-08-18 | End: 2021-09-23 | Stop reason: HOSPADM

## 2021-08-18 RX ADMIN — ACETAMINOPHEN 650 MG: 325 TABLET, FILM COATED ORAL at 21:36

## 2021-08-18 RX ADMIN — ACETAMINOPHEN 650 MG: 325 TABLET, FILM COATED ORAL at 14:05

## 2021-08-18 RX ADMIN — POTASSIUM CHLORIDE 40 MEQ: 1500 TABLET, EXTENDED RELEASE ORAL at 11:02

## 2021-08-18 RX ADMIN — DICLOFENAC SODIUM 2 G: 10 GEL TOPICAL at 17:19

## 2021-08-18 RX ADMIN — FERROUS SULFATE TAB 325 MG (65 MG ELEMENTAL FE) 325 MG: 325 (65 FE) TAB at 06:35

## 2021-08-18 RX ADMIN — QUETIAPINE FUMARATE 50 MG: 25 TABLET ORAL at 21:37

## 2021-08-18 RX ADMIN — QUETIAPINE FUMARATE 50 MG: 25 TABLET ORAL at 16:49

## 2021-08-18 RX ADMIN — BUSPIRONE HYDROCHLORIDE 15 MG: 10 TABLET ORAL at 09:33

## 2021-08-18 RX ADMIN — QUETIAPINE FUMARATE 50 MG: 25 TABLET ORAL at 12:03

## 2021-08-18 RX ADMIN — KETOROLAC TROMETHAMINE 15 MG: 30 INJECTION, SOLUTION INTRAMUSCULAR; INTRAVENOUS at 18:10

## 2021-08-18 RX ADMIN — DICLOFENAC SODIUM 2 G: 10 GEL TOPICAL at 12:03

## 2021-08-18 RX ADMIN — AMLODIPINE BESYLATE 5 MG: 5 TABLET ORAL at 09:32

## 2021-08-18 RX ADMIN — MIRTAZAPINE 7.5 MG: 15 TABLET, FILM COATED ORAL at 21:37

## 2021-08-18 RX ADMIN — ACETAMINOPHEN 650 MG: 325 TABLET, FILM COATED ORAL at 06:35

## 2021-08-18 RX ADMIN — LORAZEPAM 0.5 MG: 0.5 TABLET ORAL at 11:02

## 2021-08-18 RX ADMIN — DICLOFENAC SODIUM 2 G: 10 GEL TOPICAL at 23:25

## 2021-08-18 RX ADMIN — BUSPIRONE HYDROCHLORIDE 15 MG: 10 TABLET ORAL at 21:37

## 2021-08-18 RX ADMIN — OXYCODONE HYDROCHLORIDE AND ACETAMINOPHEN 500 MG: 500 TABLET ORAL at 17:18

## 2021-08-18 RX ADMIN — POTASSIUM CHLORIDE 40 MEQ: 1500 TABLET, EXTENDED RELEASE ORAL at 17:18

## 2021-08-18 RX ADMIN — OXYCODONE HYDROCHLORIDE AND ACETAMINOPHEN 500 MG: 500 TABLET ORAL at 09:34

## 2021-08-18 RX ADMIN — QUETIAPINE FUMARATE 50 MG: 25 TABLET ORAL at 02:30

## 2021-08-18 RX ADMIN — KETOROLAC TROMETHAMINE 15 MG: 30 INJECTION, SOLUTION INTRAMUSCULAR; INTRAVENOUS at 12:10

## 2021-08-18 RX ADMIN — LIDOCAINE 1 PATCH: 50 PATCH TOPICAL at 09:34

## 2021-08-18 RX ADMIN — PANTOPRAZOLE SODIUM 20 MG: 20 TABLET, DELAYED RELEASE ORAL at 06:35

## 2021-08-18 RX ADMIN — ACETAMINOPHEN 650 MG: 325 TABLET, FILM COATED ORAL at 02:30

## 2021-08-18 RX ADMIN — BUSPIRONE HYDROCHLORIDE 15 MG: 10 TABLET ORAL at 16:49

## 2021-08-18 RX ADMIN — PROPRANOLOL HYDROCHLORIDE 20 MG: 20 TABLET ORAL at 11:02

## 2021-08-18 RX ADMIN — PROPRANOLOL HYDROCHLORIDE 20 MG: 20 TABLET ORAL at 06:35

## 2021-08-18 RX ADMIN — MORPHINE SULFATE 15 MG: 15 TABLET, FILM COATED, EXTENDED RELEASE ORAL at 04:37

## 2021-08-18 RX ADMIN — ATORVASTATIN CALCIUM 40 MG: 40 TABLET, FILM COATED ORAL at 16:48

## 2021-08-18 RX ADMIN — KETOROLAC TROMETHAMINE 15 MG: 30 INJECTION, SOLUTION INTRAMUSCULAR; INTRAVENOUS at 01:21

## 2021-08-18 RX ADMIN — PAROXETINE HYDROCHLORIDE 60 MG: 20 TABLET, FILM COATED ORAL at 21:37

## 2021-08-18 RX ADMIN — ENOXAPARIN SODIUM 40 MG: 40 INJECTION SUBCUTANEOUS at 09:34

## 2021-08-18 RX ADMIN — FOLIC ACID 1000 MCG: 1 TABLET ORAL at 09:33

## 2021-08-18 RX ADMIN — ASPIRIN 81 MG CHEWABLE TABLET 81 MG: 81 TABLET CHEWABLE at 09:32

## 2021-08-18 RX ADMIN — KETOROLAC TROMETHAMINE 15 MG: 30 INJECTION, SOLUTION INTRAMUSCULAR; INTRAVENOUS at 23:32

## 2021-08-18 RX ADMIN — PRAZOSIN HYDROCHLORIDE 2 MG: 2 CAPSULE ORAL at 12:04

## 2021-08-18 RX ADMIN — QUETIAPINE FUMARATE 50 MG: 25 TABLET ORAL at 09:32

## 2021-08-18 NOTE — ED PROVIDER NOTES
History  Chief Complaint   Patient presents with    Anxiety     pt anxious and afraid to be at home by herself when her son goes back to work  pt wants to go to a rehab facility   Leg Pain     pt with hx of arthritis and in need of knee replacement c/o bilateral leg pain  58-year-old female history hypertension, psychiatric disorder, chronic back and leg pain, presenting due to concerns for inability to care for self  Patient is present with her son who states he has been her sole caretaker at this time and they currently live together  The son states that he has been out of work in order to care for her but is no longer available and she requires full-time care due to her inability to ambulate and chronic pain issues  They states they have met with the  in the past but there was no available placement  Patient does admit to having back and leg pain consistent with her chronic pain denies any worsening symptoms at this time  Says she finished her home opiate medication did not take anything earlier today for her symptoms  Prior to Admission Medications   Prescriptions Last Dose Informant Patient Reported? Taking?    [facility] Aspirin Adult Low Dose 81 MG chewable tablet 8/18/2021 at Unknown time  No Yes   Sig: CHEW 1 TABLET BY MOUTH DAILY   [facility] Vitamin C 500 MG tablet 8/18/2021 at Unknown time  No Yes   Sig: TAKE 1 TABLET BY MOUTH TWICE A DAY   Diapers & Supplies (Huggies Pull-Ups) MISC   No No   Sig: Use 3 (three) times a day   Diclofenac Sodium (VOLTAREN) 1 % 8/18/2021 at Unknown time  No Yes   Sig: Apply 2 g topically 4 (four) times a day   PARoxetine (PAXIL) 30 mg tablet 8/18/2021 at Unknown time  No Yes   Sig: Take 2 tablets (60 mg total) by mouth daily   QUEtiapine (SEROquel) 25 mg tablet 8/18/2021 at Unknown time  No Yes   Sig: Take 2 tablets (50 mg total) by mouth 4 (four) times daily (after meals and at bedtime)   Respiratory Therapy Supplies (Nebulizer) YUDY   No No   Sig: Use as needed (Shortness of breath)   Patient not taking: Reported on 4/20/2021   Valbenazine Tosylate (Ingrezza) 80 MG CAPS 8/18/2021 at Unknown time  No Yes   Sig: Take 80 mg by mouth daily   amLODIPine (NORVASC) 5 mg tablet 8/18/2021 at Unknown time  No Yes   Sig: Take 1 tablet (5 mg total) by mouth daily   atorvastatin (LIPITOR) 40 mg tablet 8/18/2021 at Unknown time  No Yes   Sig: TAKE 1 TABLET BY MOUTH DAILY WITH DINNER   busPIRone (BUSPAR) 10 mg tablet 8/18/2021 at Unknown time  Yes Yes   Sig: TAKE 2 TABLETS (20 MG TOTAL) BY MOUTH 3 (THREE) TIMES A DAY  busPIRone (BUSPAR) 15 mg tablet Not Taking at Unknown time  No No   Sig: Take 1 tablet (15 mg total) by mouth 3 (three) times a day   Patient not taking: Reported on 8/12/2021   ferrous sulfate 324 (65 Fe) mg 8/18/2021 at Unknown time  No Yes   Sig: TAKE 1 TABLET (324 MG TOTAL) BY MOUTH 2 (TWO) TIMES A DAY BEFORE MEALS   folic acid (FOLVITE) 1 mg tablet 8/18/2021 at Unknown time  No Yes   Sig: TAKE 1 TABLET BY MOUTH EVERY DAY   hydrOXYzine HCL (ATARAX) 50 mg tablet 8/18/2021 at Unknown time  Yes Yes   Sig: Take 50 mg by mouth 3 (three) times a day as needed for itching   lithium carbonate (LITHOBID) 300 mg CR tablet 8/18/2021 at Unknown time  No Yes   Sig: Take 1 tablet (300 mg total) by mouth daily at bedtime   mirtazapine (REMERON) 7 5 MG tablet 8/17/2021 at Unknown time  No Yes   Sig: Take 1 tablet (7 5 mg total) by mouth daily at bedtime as needed (insomnia)   naloxone (NARCAN) 4 mg/0 1 mL nasal spray   No No   Sig: Administer 1 spray into a nostril  If no response after 2-3 minutes, give another dose in the other nostril using a new spray     pantoprazole (PROTONIX) 20 mg tablet 8/18/2021 at Unknown time  No Yes   Sig: TAKE 1 TABLET (20 MG TOTAL) BY MOUTH DAILY IN THE EARLY MORNING   potassium chloride (K-DUR,KLOR-CON) 20 mEq tablet 8/18/2021 at Unknown time  No Yes   Sig: Take 1 tablet (20 mEq total) by mouth daily   prazosin (MINIPRESS) 2 mg capsule 8/18/2021 at Unknown time Outside Facility (Specify) No Yes   Sig: TAKE 1 CAPSULE BY MOUTH EVERY DAY   pregabalin (LYRICA) 100 mg capsule   No No   Sig: Take 1 capsule (100 mg total) by mouth 2 (two) times a day   propranolol (INDERAL) 20 mg tablet 2021 at Unknown time  No Yes   Sig: Take 1 tablet (20 mg total) by mouth 2 (two) times a day before breakfast and lunch   senna-docusate sodium (SENOKOT S) 8 6-50 mg per tablet 2021 at Unknown time  No Yes   Sig: Take 1 tablet by mouth daily as needed for constipation      Facility-Administered Medications: None       Past Medical History:   Diagnosis Date    Acid reflux     Anxiety     RESOLVED: [id number]    Arthritis     Bipolar 2 disorder (HCC)     FOLLOWS WITH PSYCHIATRIST  CONTINUE LAMOTRIGINE; RESOLVED: [id number]    Depression     Familial tremor     both hands    Fibromyalgia     LAST ASSESSED: [id number]    Hearing aid worn     left ear    [place]o (hard of hearing)     left ear    Hypertension     Left-sided weakness     Lower back pain     Memory loss of unknown cause     long and short term    Migraine     Obesity     Obesity, Class II, BMI 35-39 9     Overactive bladder     Panic attack     Post traumatic stress disorder     Seasonal allergies     Stroke [facility])     questionable stroke 2009    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    CONT ASA 81 MG DAILY; RESOLVED: [id number]    Urinary incontinence     Wears dentures     partial lower / full upper    Wears glasses        Past Surgical History:   Procedure Laterality Date    BACK SURGERY       SECTION      COLONOSCOPY      RESOLVED: [id number]    EAR SURGERY      EGD      HYSTERECTOMY      MYRINGOTOMY W/ TUBES Left     NECK SURGERY  2019    W[place] CYSTOURETHROSCOPY N/A 2016    Procedure: CYSTOSCOPY, BOTOX INJECTION;  Surgeon: Guanakito Albright MD;  Location: [facility];  Service: Gynecology    W[place] IMPLANT SPINAL NEUROSTIM/ Right 2/10/2021    Procedure: REPLACEMENT IMPLANTABLE PULSE GENERATOR DORSAL SPINAL COLUMN STIMULATOR, RIGHT;  Surgeon: Denisha Read MD;  [facility]: [facility];  Service: Neurosurgery    NE PERCUT IMPLNT [address] Right 7/28/2020    Procedure: INSERTION THORACIC DORSAL COLUMN SPINAL CORD STIMULATOR PERCUTANEOUS W IMPLANTABLE PULSE GENERATOR, RIGHT;  Surgeon: Denisha Read MD;  Location:  MAIN OR;  Service: Neurosurgery   [facility]    UPPER GASTROINTESTINAL ENDOSCOPY  09/2020       Family History   Problem Relation Age of Onset    Colon cancer Mother     Alzheimer's disease Father     Stroke Father     Colon cancer Brother     Bipolar disorder Brother     Breast cancer Maternal Aunt     Colon cancer Maternal Aunt     Heart disease Other     Diabetes Other     Hypertension Other     Seizures Son     Depression Paternal Grandfather     No Known Problems Sister     No Known Problems Brother     Thyroid disease Neg Hx      I have reviewed and agree with the history as documented  E-Cigarette/Vaping    E-Cigarette Use Never User      E-Cigarette/Vaping Substances    Nicotine No     THC No     CBD No     Flavoring No     Other No     Unknown No      Social History     Tobacco Use    Smoking status: Never Smoker    Smokeless tobacco: Never Used   Vaping Use    Vaping Use: Never used   Substance Use Topics    Alcohol use: Not Currently     Comment: 2 x year; being a social drinker as per all scripts     Drug use: No        Review of Systems   Constitutional: Negative for chills and fever  HENT: Negative for ear pain and sore throat  Eyes: Negative for visual disturbance  Respiratory: Negative for cough and shortness of breath  Cardiovascular: Negative for chest pain and palpitations  Gastrointestinal: Negative for abdominal pain and vomiting  Genitourinary: Negative for dysuria and hematuria     Musculoskeletal: Positive for arthralgias, back pain and gait problem  Skin: Negative for color change and rash  Neurological: Positive for weakness  Negative for syncope  All other systems reviewed and are negative  Physical Exam  ED Triage Vitals   Temperature Pulse Respirations Blood Pressure SpO2   08/17/21 2257 08/17/21 2257 08/17/21 2257 08/17/21 2257 08/17/21 2257   98 4 °F (36 9 °C) 81 20 164/99 99 %      Temp src Heart Rate Source Patient Position - Orthostatic VS BP Location FiO2 (%)   -- 08/18/21 0110 08/18/21 0110 08/18/21 0110 --    Monitor Lying Left arm       Pain Score       08/18/21 0121       8             Orthostatic Vital Signs  Vitals:    08/19/21 0801 08/19/21 1243 08/19/21 1244 08/19/21 1546   BP: 151/98 148/96 148/96 138/89   Pulse: 57 60 64 60   Patient Position - Orthostatic VS:           Physical Exam  Vitals and nursing note reviewed  Constitutional:       General: She is in acute distress  Appearance: She is well-developed  HENT:      Head: Normocephalic and atraumatic  Right Ear: External ear normal       Left Ear: External ear normal    Eyes:      Conjunctiva/sclera: Conjunctivae normal    Cardiovascular:      Rate and Rhythm: Normal rate and regular rhythm  Pulmonary:      Effort: Pulmonary effort is normal  No respiratory distress  Breath sounds: Normal breath sounds  Abdominal:      General: There is no distension  Palpations: Abdomen is soft  Musculoskeletal:         General: Tenderness present  Cervical back: Neck supple  Skin:     General: Skin is warm and dry  Neurological:      Mental Status: She is alert and oriented to person, place, and time     Psychiatric:         Mood and Affect: Mood normal          ED Medications  Medications   ketorolac (TORADOL) injection 15 mg (15 mg Intravenous Given 8/19/21 2156)   amLODIPine (NORVASC) tablet 5 mg (5 mg Oral Given 8/19/21 0827)   atorvastatin (LIPITOR) tablet 40 mg (40 mg Oral Given 8/19/21 1550)   busPIRone (BUSPAR) tablet 15 mg (15 mg Oral Given 8/19/21 2119)   aspirin chewable tablet 81 mg (81 mg Oral Given 8/19/21 0828)   ascorbic acid (VITAMIN C) tablet 500 mg (500 mg Oral Given 8/19/21 1830)   ferrous sulfate tablet 325 mg (325 mg Oral Given 8/00/11 8012)   folic acid (FOLVITE) tablet 1,000 mcg (1,000 mcg Oral Given 8/19/21 0827)   hydrOXYzine HCL (ATARAX) tablet 50 mg (has no administration in time range)   lithium carbonate (LITHOBID) CR tablet 300 mg (300 mg Oral Not Given 8/18/21 2336)   mirtazapine (REMERON) tablet 7 5 mg (0 mg Oral Return to [facility] 8/19/21 2124)   pantoprazole (PROTONIX) EC tablet 20 mg (20 mg Oral Given 8/19/21 0506)   PARoxetine (PAXIL) tablet 60 mg (60 mg Oral Given 8/19/21 2119)   prazosin (MINIPRESS) capsule 2 mg (2 mg Oral Given 8/19/21 1245)   propranolol (INDERAL) tablet 20 mg (20 mg Oral Given 8/19/21 1243)   QUEtiapine (SEROquel) tablet 50 mg (50 mg Oral Given 8/19/21 2119)   senna-docusate sodium (SENOKOT S) 8 6-50 mg per tablet 1 tablet (has no administration in time range)   Valbenazine Tosylate CAPS 80 mg (80 mg Oral Given 8/19/21 0830)   acetaminophen (TYLENOL) tablet 650 mg (650 mg Oral Given 8/19/21 2119)   enoxaparin (LOVENOX) subcutaneous injection 40 mg (40 mg Subcutaneous Given 8/19/21 0829)   lidocaine (LIDODERM) 5 % patch 1 patch (1 patch Topical Patch Removed 8/19/21 2016)   Diclofenac Sodium (VOLTAREN) 1 % topical gel 2 g (2 g Topical Given 8/19/21 2120)   ondansetron (ZOFRAN-ODT) dispersible tablet 4 mg (has no administration in time range)   LORazepam (ATIVAN) tablet 0 5 mg (0 5 mg Oral Given 8/19/21 2156)   pregabalin (LYRICA) capsule 100 mg (100 mg Oral Given 8/19/21 1830)   oxyCODONE (ROXICODONE) IR tablet 5 mg (5 mg Oral Given 8/19/21 1307)   diazepam (VALIUM) tablet 5 mg (5 mg Oral Given 8/17/21 2329)   morphine (MS CONTIN) ER tablet 15 mg (15 mg Oral Given 8/18/21 0437)   potassium chloride (K-DUR,KLOR-CON) CR tablet 40 mEq (40 mEq Oral Given 8/18/21 1718)       Diagnostic Studies  Results Reviewed     Procedure Component Value Units Date/Time    Comprehensive metabolic panel [[id number]]  (Abnormal) Collected: 08/18/21 0531    Lab Status: Final result Specimen: Blood from Arm, Left Updated: 08/18/21 6924     Sodium 142 mmol/L      Potassium 3 1 mmol/L      Chloride 110 mmol/L      CO2 27 mmol/L      ANION GAP 5 mmol/L      BUN 8 mg/dL      Creatinine 0 89 mg/dL      Glucose 103 mg/dL      Calcium 8 8 mg/dL      AST 12 U/L      ALT 18 U/L      Alkaline Phosphatase 104 U/L      Total Protein 6 9 g/dL      Albumin 3 6 g/dL      Total Bilirubin 1 16 mg/dL      eGFR 83 ml/min/1 73sq m     Narrative:      [place] guidelines for Chronic Kidney Disease (CKD):     Stage 1 with normal or high GFR (GFR > 90 mL/min/1 73 square meters)    Stage 2 Mild CKD (GFR = 60-89 mL/min/1 73 square meters)    Stage 3A Moderate CKD (GFR = 45-59 mL/min/1 73 square meters)    Stage 3B Moderate CKD (GFR = 30-44 mL/min/1 73 square meters)    Stage 4 Severe CKD (GFR = 15-29 mL/min/1 73 square meters)    Stage 5 End Stage CKD (GFR <15 mL/min/1 73 square meters)  Note: GFR calculation is accurate only with a steady state creatinine    CBC and differential [[id number]] Collected: 08/18/21 0531    Lab Status: Final result Specimen: Blood from Arm, Left Updated: 08/18/21 0548     WBC 5 16 Thousand/uL      RBC 4 36 Million/uL      Hemoglobin 13 1 g/dL      Hematocrit 39 3 %      MCV 90 fL      MCH 30 0 pg      MCHC 33 3 g/dL      RDW 13 8 %      MPV 9 6 fL      Platelets 954 Thousands/uL      nRBC 0 /100 WBCs      Neutrophils Relative 62 %      Immat GRANS % 0 %      Lymphocytes Relative 24 %      Monocytes Relative 10 %      Eosinophils Relative 3 %      Basophils Relative 1 %      Neutrophils Absolute 3 24 Thousands/µL      Immature Grans Absolute 0 01 Thousand/uL      Lymphocytes Absolute 1 25 Thousands/µL      Monocytes Absolute 0 50 Thousand/µL      Eosinophils Absolute 0 13 Thousand/µL      Basophils Absolute 0 03 Thousands/µL                  No orders to display         Procedures  Procedures      ED Course                                       MDM  Number of Diagnoses or Management Options  Anxiety  Chronic leg pain  Diagnosis management comments: Patient unable to care for self at this time and unable to ambulate  Admitted to [facility] for ambulatory dyfxn and failure to thrive      Disposition  Final diagnoses:   Chronic leg pain   Anxiety     Time reflects when diagnosis was documented in both MDM as applicable and the Disposition within this note     Time User Action Codes Description Comment    8/18/2021 12:31 AM Antolin Fantasma Add [M79 606,  G89 29] Chronic leg pain     8/18/2021 12:31 AM Antolin Fantasma Add [F41 9] Anxiety     8/18/2021  4:07 PM Chawla Corti Add [F31 81] Bipolar II disorder ([facility] [address] )     8/18/2021  4:07 PM Chawla Corti Add [F43 10] Post traumatic stress disorder     8/18/2021  4:07 PM Chawla Corti Add [F41 0] Panic disorder without agoraphobia       ED Disposition     ED Disposition Condition Date/Time Comment    Admit Stable Wed Aug 18, 2021 12:31 AM Case was discussed with SOD and the patient's admission status was agreed to be Admission Status: observation status to the service of Dr Bernie Jeter   Follow-up Information    None         Current Discharge Medication List      CONTINUE these medications which have NOT CHANGED    Details   amLODIPine (NORVASC) 5 mg tablet Take 1 tablet (5 mg total) by mouth daily  Qty: 90 tablet, Refills: 3    Associated Diagnoses: Hypertension, unspecified type      atorvastatin (LIPITOR) 40 mg tablet TAKE 1 TABLET BY MOUTH DAILY WITH DINNER  Qty: 30 tablet, Refills: 0    Associated Diagnoses: Altered mental status      !! busPIRone (BUSPAR) 10 mg tablet TAKE 2 TABLETS (20 MG TOTAL) BY MOUTH 3 (THREE) TIMES A DAY        [facility] Aspirin Adult Low Dose 81 MG chewable tablet CHEW 1 TABLET BY MOUTH DAILY  Qty: 30 tablet, Refills: 0    Associated Diagnoses:  Altered mental status      [facility]S Vitamin C 500 MG tablet TAKE 1 TABLET BY MOUTH TWICE A DAY  Qty: 60 tablet, Refills: 0    Associated Diagnoses: Primary osteoarthritis of right knee; Preop testing      Diclofenac Sodium (VOLTAREN) 1 % Apply 2 g topically 4 (four) times a day  Qty: 150 g, Refills: 0    Associated Diagnoses: Bilateral chronic knee pain      ferrous sulfate 324 (65 Fe) mg TAKE 1 TABLET (324 MG TOTAL) BY MOUTH 2 (TWO) TIMES A DAY BEFORE MEALS  Qty: 60 tablet, Refills: 1    Associated Diagnoses: Primary osteoarthritis of right knee; Preop testing      folic acid (FOLVITE) 1 mg tablet TAKE 1 TABLET BY MOUTH EVERY DAY  Qty: 30 tablet, Refills: 1    Associated Diagnoses: Primary osteoarthritis of right knee; Preop testing      hydrOXYzine HCL (ATARAX) 50 mg tablet Take 50 mg by mouth 3 (three) times a day as needed for itching      lithium carbonate (LITHOBID) 300 mg CR tablet Take 1 tablet (300 mg total) by mouth daily at bedtime  Qty: 30 tablet, Refills: 0    Associated Diagnoses: Medical clearance for psychiatric admission      mirtazapine (REMERON) 7 5 MG tablet Take 1 tablet (7 5 mg total) by mouth daily at bedtime as needed (insomnia)  Qty: 30 tablet, Refills: 0    Associated Diagnoses: Medical clearance for psychiatric admission      pantoprazole (PROTONIX) 20 mg tablet TAKE 1 TABLET (20 MG TOTAL) BY MOUTH DAILY IN THE EARLY MORNING  Qty: 30 tablet, Refills: 0    Associated Diagnoses: Nausea      PARoxetine (PAXIL) 30 mg tablet Take 2 tablets (60 mg total) by mouth daily  Qty: 60 tablet, Refills: 0    Associated Diagnoses: Medical clearance for psychiatric admission      potassium chloride (K-DUR,KLOR-CON) 20 mEq tablet Take 1 tablet (20 mEq total) by mouth daily  Qty: 30 tablet, Refills: 0    Associated Diagnoses: Medical clearance for psychiatric admission      prazosin (MINIPRESS) 2 mg capsule TAKE 1 CAPSULE BY MOUTH EVERY DAY  Qty: 30 capsule, Refills: 1    Associated Diagnoses: Hypertension, unspecified type      propranolol (INDERAL) 20 mg tablet Take 1 tablet (20 mg total) by mouth 2 (two) times a day before breakfast and lunch  Qty: 90 tablet, Refills: 0    Associated Diagnoses: Medical clearance for psychiatric admission      QUEtiapine (SEROquel) 25 mg tablet Take 2 tablets (50 mg total) by mouth 4 (four) times daily (after meals and at bedtime)  Qty: 120 tablet, Refills: 0    Associated Diagnoses: Medical clearance for psychiatric admission      senna-docusate sodium (SENOKOT S) 8 6-50 mg per tablet Take 1 tablet by mouth daily as needed for constipation  Qty: 30 tablet, Refills: 0    Associated Diagnoses: Medical clearance for psychiatric admission      Valbenazine Tosylate (Ingrezza) 80 MG CAPS Take 80 mg by mouth daily  Qty: 30 capsule, Refills: 1    Associated Diagnoses: Tardive dyskinesia      !! busPIRone (BUSPAR) 15 mg tablet Take 1 tablet (15 mg total) by mouth 3 (three) times a day  Qty: 90 tablet, Refills: 0    Associated Diagnoses: Bipolar II disorder (HCC)      Diapers & Supplies (Huggies Pull-Ups) MISC Use 3 (three) times a day  Qty: 100 each, Refills: 3    Comments: Please dispense size XXL  Associated Diagnoses: Urge incontinence of urine      naloxone (NARCAN) 4 mg/0 1 mL nasal spray Administer 1 spray into a nostril  If no response after 2-3 minutes, give another dose in the other nostril using a new spray  Qty: 1 each, Refills: 1    Associated Diagnoses: Lumbar radiculopathy; Chronic pain disorder      pregabalin (LYRICA) 100 mg capsule Take 1 capsule (100 mg total) by mouth 2 (two) times a day  Qty: 60 capsule, Refills: 1    Associated Diagnoses: Lumbar radiculopathy; Chronic pain disorder; Chronic pain syndrome; Fibromyalgia;  Chronic bilateral low back pain with bilateral sciatica      Respiratory Therapy Supplies (Nebulizer) YUDY Use as needed (Shortness of breath)  Qty: 1 each, Refills: 0    Associated Diagnoses: Dyspnea       !! - Potential duplicate medications found  Please discuss with provider  No discharge procedures on file  PDMP Review       Value Time User    PDMP Reviewed  Yes 8/19/2021  6:36 AM Joo Becerra DO           ED Provider  Attending physically available and evaluated Samantha Valdez I managed the patient along with the ED Attending      Electronically Signed by         Mateusz Whitman DO  08/19/21 6123

## 2021-08-18 NOTE — ASSESSMENT & PLAN NOTE
Patient is on multiple psychiatric medications and was recently evaluated by inpatient psych at [facility] with adjustments to her medications including buspirone 50 mg t i d , paroxetine 60 mg daily, quetiapine 50 mg 4 times daily, mirtazapine 7 5 mg q h s , lithium 300 mg q h s  And hydroxyzine 50 mg t i d  P r n  [facility] Patient continues to have increased anxiety with daily morning panic attacks  Patient also reports that her anxiety is worsened by her lack of pain control  Plan:  · Psychiatry consulted- appreciate recommendations  · Continue medical management  · Continue current psychotropic medication  · She is psychiatrically cleared to go to SNF  · Confirmed psych medication regimen following discharge from [address] [place] [address] as noted above

## 2021-08-18 NOTE — H&P
INTERNAL MEDICINE RESIDENCY ADMISSION H&P     Name: Analia Adams   Age & Sex: 60 y o  female   MRN: [id number]  Unit/Bed#: ED 20   Encounter: [id number]  Primary Care Provider: Robert Bond MD    Code Status: Level 1 - Full Code  Admission Status: OBSERVATION  Disposition: Patient requires Med/Surg    Admit to team: SOD Team A    ASSESSMENT/PLAN     Principal Problem:    Ambulatory dysfunction  Active Problems:    Chronic bilateral low back pain with bilateral sciatica    Chronic pain syndrome    Anxiety    Esophageal reflux    Generalized anxiety disorder    Tardive dyskinesia    History of CVA (cerebrovascular accident)    Mixed hyperlipidemia      * Ambulatory dysfunction  Assessment & Plan  Patient with decreased physical mobility, at risk for falls  She uses walker and wheelchair at home  Presented to the ED requesting placement to SNF  · Encourage good body mechanics and assist with transfers  · Keep personal items and call bell close to the patient  · Avoid extremes of blood pressure and avoid hypoglycemia  · PT and OT  ·  consult for placement  History of CVA (cerebrovascular accident)  Assessment & Plan  Continue aspirin and statin  Tardive dyskinesia  Assessment & Plan  Continue Valbenazine 80 mg daily  Generalized anxiety disorder  Assessment & Plan  On multiple medications including buspirone 50 mg t i d , paroxetine 60 mg daily, quetiapine 50 mg 4 times daily, mirtazapine 7 5 mg q h s , lithium 300 mg q h s  And hydroxyzine 50 mg t i d  P r n       Esophageal reflux  Assessment & Plan  Continue Protonix 20 mg daily  Chronic pain syndrome  Assessment & Plan  Patient has been on chronic opioids for low back and leg pain  She was seen in the clinic and discussed opioid tapering  Most recent opioid prescription per PDMP was for morphine 15 mg for 10 days (20 tablets)  Per chart review, concern for polypharmacy and suspicion of drug-seeking    · Will hold off on opioids at this time due to concern of CNS depression and polypharmacy  · Scheduled Tylenol  · Lidocaine patch/Voltaren gel  · Toradol as needed for severe pain x2 days  VTE Pharmacologic Prophylaxis: Enoxaparin (Lovenox)  VTE Mechanical Prophylaxis: sequential compression device    CHIEF COMPLAINT     Chief Complaint   Patient presents with    Anxiety     pt anxious and afraid to be at home by herself when her son goes back to work  pt wants to go to a rehab facility   Leg Pain     pt with hx of arthritis and in need of knee replacement c/o bilateral leg pain  HISTORY OF PRESENT ILLNESS     Patient is a 60years old female with extensive psychiatric history on several psychotropic medications, chronic pain syndrome, opioid dependence, hypertension, hyperlipidemia, prior CVA presented to the ED requesting placement to skilled nursing facility  Patient was seen earlier today in the clinic with her son Gail Velez   One day prior to that she was seen in the ED due to panic attack when her son wanted to leave the house without her  Son is returning to work today on 08/18 after taking Aleve to help take care of his mother  Patient stated that she will not be able to manage by herself when her son is at work  She is worried that she would fall  She uses a walker and has a wheelchair but no enough room to use wheelchair and she is unable to self propel wheelchair due to decreased strength  Due to concern for patient's safety the presented to the ED to request rehab placement  Patient in the ED in no acute distress and vitals within normal limits  She does report pain all over her body including spine, neck, low back, neck, legs and thigh  Also complains of nausea but no vomiting, abdominal pain, diarrhea or constipation  Patient will be admitted for ambulatory dysfunction, PT/OT evaluation and placement  REVIEW OF SYSTEMS     Review of Systems   Constitutional: Negative for chills and fever  Respiratory: Negative for cough, shortness of breath and wheezing  Cardiovascular: Negative for chest pain, palpitations and leg swelling  Gastrointestinal: Positive for nausea  Negative for abdominal pain, constipation, diarrhea and vomiting  Musculoskeletal: Positive for arthralgias, back pain, gait problem, myalgias and neck pain  Neurological: Positive for headaches  Negative for dizziness  OBJECTIVE     Vitals:    217 21 0110   BP: 164/99 138/83   BP Location:  Left arm   Pulse: 81 74   Resp: 20 18   Temp: 98 4 °F (36 9 °C)    SpO2: 99% 100%   Weight: 88 8 kg (195 lb 12 3 oz)       Temperature:   Temp (24hrs), Av 2 °F (36 8 °C), Min:97 9 °F (36 6 °C), Max:98 4 °F (36 9 °C)    Temperature: 98 4 °F (36 9 °C)  Intake & Output:  I/O     None        Weights:        Body mass index is 36 99 kg/m²  Weight (last 2 days)     Date/Time   Weight    21   88 8 (195 77)            Physical Exam  Vitals and nursing note reviewed  Constitutional:       General: She is not in acute distress  Appearance: She is well-developed  HENT:      Head: Normocephalic and atraumatic  Mouth/Throat:      Comments: Uncontrolled tongue movements  Eyes:      Conjunctiva/sclera: Conjunctivae normal    Cardiovascular:      Rate and Rhythm: Normal rate and regular rhythm  Heart sounds: No murmur heard  Pulmonary:      Effort: Pulmonary effort is normal  No respiratory distress  Breath sounds: Normal breath sounds  Abdominal:      Palpations: Abdomen is soft  Tenderness: There is no abdominal tenderness  Musculoskeletal:      Cervical back: Neck supple  Skin:     General: Skin is warm and dry  Neurological:      Mental Status: She is alert and oriented to person, place, and time        Comments: Tremors bilateral upper extremities       PAST MEDICAL HISTORY     Past Medical History:   Diagnosis Date    Acid reflux     Anxiety     RESOLVED: [id number]    Arthritis     Bipolar 2 disorder (HCC)     FOLLOWS WITH PSYCHIATRIST  CONTINUE LAMOTRIGINE; RESOLVED: [id number]    Depression     Familial tremor     both hands    Fibromyalgia     LAST ASSESSED: [id number]    Hearing aid worn     left ear    [place] (hard of hearing[place]     left ear    Hypertension     Left-sided weakness     Lower back pain     Memory loss of unknown cause     long and short term    Migraine     Obesity     Obesity, Class II, BMI 35-39 9     Overactive bladder     Panic attack     Post traumatic stress disorder     Seasonal allergies     Stroke [facility])     questionable stroke 2009    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    CONT ASA 81 MG DAILY; RESOLVED: [id number]    Urinary incontinence     Wears dentures     partial lower / full upper    Wears glasses      PAST SURGICAL HISTORY     Past Surgical History:   Procedure Laterality Date    BACK SURGERY       SECTION      COLONOSCOPY      RESOLVED: [id number]    EAR SURGERY      EGD      HYSTERECTOMY      MYRINGOTOMY W/ TUBES Left     NECK SURGERY  2019    DE CYSTOURETHROSCOPY N/A 2016    Procedure: CYSTOSCOPY, BOTOX INJECTION;  Surgeon: Glendy Deras MD;  Location: [facility];  Service: Gynecology    DE IMPLANT SPINAL NEUROSTIM/ Right 2/10/2021    Procedure: REPLACEMENT IMPLANTABLE PULSE GENERATOR DORSAL SPINAL COLUMN STIMULATOR, RIGHT;  Surgeon: Bronson Lira MD;  [facility];  Service: Neurosurgery    DE PERCUT IMPLNT [address] Right 2020    Procedure: INSERTION THORACIC DORSAL COLUMN SPINAL CORD STIMULATOR PERCUTANEOUS W IMPLANTABLE PULSE GENERATOR, RIGHT;  Surgeon: Bronson Lira MD;  Location:  MAIN OR;  Service: Neurosurgery    0 [facility]    UPPER GASTROINTESTINAL ENDOSCOPY  2020     SOCIAL & FAMILY HISTORY     Social History     Substance and Sexual Activity   Alcohol Use Not Currently    Comment: 2 x year; being a social drinker as per all scripts      Substance and Sexual Activity   Alcohol Use Not Currently    Comment: 2 x year; being a social drinker as per all scripts         Substance and Sexual Activity   Drug Use No     Social History     Tobacco Use   Smoking Status Never Smoker   Smokeless Tobacco Never Used     Family History   Problem Relation Age of Onset    Colon cancer Mother     Alzheimer's disease Father     Stroke Father     Colon cancer Brother     Bipolar disorder Brother     Breast cancer Maternal Aunt     Colon cancer Maternal Aunt     Heart disease Other     Diabetes Other     Hypertension Other     Seizures Son     Depression Paternal Grandfather     No Known Problems Sister     No Known Problems Brother     Thyroid disease Neg Hx      LABORATORY DATA     Labs: I have personally reviewed pertinent reports  Invalid input(s):  EOSPCT       Invalid input(s): LABALBU                       Micro:  Lab Results   Component Value Date    BLOODCX No Growth After 5 Days  07/01/2021    BLOODCX No Growth After 5 Days  07/01/2021    BLOODCX No Growth After 5 Days  06/29/2021     IMAGING & DIAGNOSTIC TESTS     Imaging: I have personally reviewed pertinent reports  No results found  EKG, Pathology, and Other Studies: I have personally reviewed pertinent reports  ALLERGIES   No Known Allergies  MEDICATIONS PRIOR TO ARRIVAL     Prior to Admission medications    Medication Sig Start Date End Date Taking?  Authorizing Provider   amLODIPine (NORVASC) 5 mg tablet Take 1 tablet (5 mg total) by mouth daily 6/5/21 9/3/21 Yes Bryan Patterson MD   atorvastatin (LIPITOR) 40 mg tablet TAKE 1 TABLET BY MOUTH DAILY WITH DINNER 8/2/21  Yes Carlie Melendez DO   busPIRone (BUSPAR) 10 mg tablet TAKE 2 TABLETS (20 MG TOTAL) BY MOUTH 3 (THREE) TIMES A DAY  7/12/21  Yes Historical Provider, MD   [facility] Aspirin Adult Low Dose 81 MG chewable tablet CHEW 1 TABLET BY MOUTH DAILY 8/2/21  Yes Tyler Wilson Johnson King, DO   [facility] Vitamin C 500 MG tablet TAKE 1 TABLET BY MOUTH TWICE A DAY 4/14/21  Yes Benny Mendez MD   Diclofenac Sodium (VOLTAREN) 1 % Apply 2 g topically 4 (four) times a day 6/28/21  Yes Tico Carey MD   ferrous sulfate 324 (65 Fe) mg TAKE 1 TABLET (324 MG TOTAL) BY MOUTH 2 (TWO) TIMES A DAY BEFORE MEALS 4/13/21  Yes Benny Mendez MD   folic acid (FOLVITE) 1 mg tablet TAKE 1 TABLET BY MOUTH EVERY DAY 4/14/21  Yes Benny Mendez MD   hydrOXYzine HCL (ATARAX) 50 mg tablet Take 50 mg by mouth 3 (three) times a day as needed for itching   Yes Regina Sofia MD   lithium carbonate (LITHOBID) 300 mg CR tablet Take 1 tablet (300 mg total) by mouth daily at bedtime 8/5/21  Yes Adalberto Drake MD   mirtazapine (REMERON) 7 5 MG tablet Take 1 tablet (7 5 mg total) by mouth daily at bedtime as needed (insomnia) 8/5/21  Yes Adalberto Drake MD   pantoprazole (PROTONIX) 20 mg tablet TAKE 1 TABLET (20 MG TOTAL) BY MOUTH DAILY IN THE EARLY MORNING 8/2/21  Yes Ramiro Botello DO   PARoxetine (PAXIL) 30 mg tablet Take 2 tablets (60 mg total) by mouth daily 8/6/21  Yes Adalberto Drake MD   potassium chloride (K-DUR,KLOR-CON) 20 mEq tablet Take 1 tablet (20 mEq total) by mouth daily 8/6/21  Yes Adalberto Drake MD   prazosin (MINIPRESS) 2 mg capsule TAKE 1 CAPSULE BY MOUTH EVERY DAY 7/9/20  Yes Efra Beltrán MD   propranolol (INDERAL) 20 mg tablet Take 1 tablet (20 mg total) by mouth 2 (two) times a day before breakfast and lunch 8/5/21  Yes Adalberto Drake MD   QUEtiapine (SEROquel) 25 mg tablet Take 2 tablets (50 mg total) by mouth 4 (four) times daily (after meals and at bedtime) 8/5/21  Yes Adalberto Drake MD   senna-docusate sodium (SENOKOT S) 8 6-50 mg per tablet Take 1 tablet by mouth daily as needed for constipation 8/5/21  Yes Adalberto Drake MD   Valbenazine Tosylate (Ingrezza) 80 MG CAPS Take 80 mg by mouth daily 3/17/21  Yes Benny Mendez MD   busPIRone (BUSPAR) 15 mg tablet Take 1 tablet (15 mg total) by mouth 3 (three) times a day  Patient not taking: Reported on 8/12/2021 8/5/21   Scottie Morin MD   Diapers & Supplies (Huggies Pull-Ups) MISC Use 3 (three) times a day 8/17/21   Griselda Burow, MD   naloxone [facility]) 4 mg/0 1 mL nasal spray Administer 1 spray into a nostril  If no response after 2-3 minutes, give another dose in the other nostril using a new spray   3/29/21   Praneeth Bojorquez DO   pregabalin (LYRICA) 100 mg capsule Take 1 capsule (100 mg total) by mouth 2 (two) times a day 7/7/21 8/17/21  Darian Barrera MD   Respiratory Therapy Supplies (Nebulizer) YUDY Use as needed (Shortness of breath)  Patient not taking: Reported on 4/20/2021 3/29/21   Andrew Rosa DO     MEDICATIONS ADMINISTERED IN LAST 24 HOURS     Medication Administration - last 24 hours from 08/17/2021 0207 to 08/18/2021 0207       Date/Time Order Dose Route Action Action by     08/17/2021 2329 diazepam (VALIUM) tablet 5 mg 5 mg Oral Given [address], RN     08/18/2021 0121 ketorolac (TORADOL) injection 15 mg 15 mg Intravenous Given Cristopher Valdez, MARIA        CURRENT MEDICATIONS     Current Facility-Administered Medications   Medication Dose Route Frequency Provider Last Rate    acetaminophen  650 mg Oral Q8H Frank Jha MD      amLODIPine  5 mg Oral Daily Song Murray MD      ascorbic acid  500 mg Oral BID Song Murray MD      aspirin  81 mg Oral Daily Song Murray MD      atorvastatin  40 mg Oral Daily With Yoel Lobo MD      busPIRone  15 mg Oral TID Song Murray MD      Diclofenac Sodium  2 g Topical 4x Daily Song Murray MD      enoxaparin  40 mg Subcutaneous Daily Song Murray MD      ferrous sulfate  325 mg Oral Daily With Breakfast Song Murray MD      folic acid  6,834 mcg Oral Daily Song Murray MD      hydrOXYzine HCL  50 mg Oral TID PRN Song Murray MD      ketorolac  15 mg Intravenous Q6H PRN Song Murray MD      lidocaine  1 patch Topical Daily Song Murray MD      lithium carbonate  300 mg Oral HS Song Murray MD      mirtazapine 7 5 mg Oral HS PRN Shana Gates MD      pantoprazole  20 mg Oral Early Morning Shana Gates MD      PARoxetine  60 mg Oral Daily Shana Gates MD      prazosin  2 mg Oral Daily Shana Gates MD      propranolol  20 mg Oral BID before breakfast/lunch Shana Gates MD      QUEtiapine  50 mg Oral 4x Daily (PC & HS) Shana Gates MD      senna-docusate sodium  1 tablet Oral Daily PRN Shana Gates MD      Valbenazine Tosylate  80 mg Oral Daily Shana Gates MD          hydrOXYzine HCL, 50 mg, TID PRN  ketorolac, 15 mg, Q6H PRN  mirtazapine, 7 5 mg, HS PRN  senna-docusate sodium, 1 tablet, Daily PRN        Admission Time  I spent 30 minutes admitting the patient  This involved direct patient contact where I performed a full history and physical, reviewing previous records, and reviewing laboratory and other diagnostic studies  Portions of the record may have been created with voice recognition software  Occasional wrong word or "sound a like" substitutions may have occurred due to the inherent limitations of voice recognition software    Read the chart carefully and recognize, using context, where substitutions have occurred     ==  Shana Gates MD  [address]  Internal Medicine Residency PGY-3

## 2021-08-18 NOTE — ASSESSMENT & PLAN NOTE
Patient with decreased physical mobility, at risk for falls  She uses walker and wheelchair at home  Presented to the ED requesting placement to SNF  · Encourage good body mechanics and assist with transfers    · Keep personal items and call bell close to the patient  · PT and OT- recommends post acute rehab  · Patient is pending placement-case management consult

## 2021-08-18 NOTE — PHYSICAL THERAPY NOTE
PHYSICAL THERAPY EVALUATION  NAME:  Ananth Dang  DATE: 21    AGE:   60 y o  Mrn:   [id number]  ADMIT DX:  Anxiety [F41 9]  Leg pain [M79 606]  Anxiety attack [F41 0]  Chronic leg pain [M79 606, G89 29]    Past Medical History:   Diagnosis Date    Acid reflux     Anxiety     RESOLVED: [id number]    Arthritis     Bipolar 2 disorder (HCC)     FOLLOWS WITH PSYCHIATRIST  CONTINUE LAMOTRIGINE; RESOLVED: [id number]    Depression     Familial tremor     both hands    Fibromyalgia     LAST ASSESSED: [id number]    Hearing aid worn     left ear    [place] (hard of hearing)     left ear    Hypertension     Left-sided weakness     Lower back pain     Memory loss of unknown cause     long and short term    Migraine     Obesity     Obesity, Class II, BMI 35-39 9     Overactive bladder     Panic attack     Post traumatic stress disorder     Seasonal allergies     Stroke [facility])     questionable stroke 2009    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    CONT ASA 81 MG DAILY; RESOLVED: [id number]    Urinary incontinence     Wears dentures     partial lower / full upper    Wears glasses        Past Surgical History:   Procedure Laterality Date    BACK SURGERY       SECTION      COLONOSCOPY      RESOLVED: [id number]    EAR SURGERY      EGD      HYSTERECTOMY  2004    MYRINGOTOMY W/ TUBES Left     NECK SURGERY  2019    NE CYSTOURETHROSCOPY N/A 2016    Procedure: CYSTOSCOPY, BOTOX INJECTION;  Surgeon: Margot Fall MD;  [facility];  Service: Gynecology    NE IMPLANT SPINAL NEUROSTIM/ Right 2/10/2021    Procedure: REPLACEMENT IMPLANTABLE PULSE GENERATOR DORSAL SPINAL COLUMN STIMULATOR, RIGHT;  Surgeon: Taina Dove MD;  Location: [facility];  Service: Neurosurgery    NE PERCUT IMPLNT NEUROELECT,EPIDURAL Right 2020    Procedure: INSERTION THORACIC DORSAL COLUMN SPINAL CORD STIMULATOR PERCUTANEOUS W IMPLANTABLE PULSE GENERATOR, RIGHT; Surgeon: Noman Sanchez MD;  Location:  MAIN OR;  Service: Neurosurgery    [facility]    UPPER GASTROINTESTINAL ENDOSCOPY  09/2020       Length Of Stay: 0    PHYSICAL THERAPY EVALUATION:        08/18/21 0915   Note Type   Note type Evaluation   Pain Assessment   Pain Assessment Tool FLACC   Pain Rating: FLACC (Rest) - Face 0   Pain Rating: FLACC (Rest) - Legs 0   Pain Rating: FLACC (Rest) - Activity 0   Pain Rating: FLACC (Rest) - Cry 0   Pain Rating: FLACC (Rest) - Consolability 0   Score: FLACC (Rest) 0   Pain Rating: FLACC (Activity) - Face 1   Pain Rating: FLACC (Activity) - Legs 0   Pain Rating: FLACC (Activity) - Activity 1   Pain Rating: FLACC (Activity) - Cry 1   Pain Rating: FLACC (Activity) - Consolability 0   Score: FLACC (Activity) 3   Home Living   Type of Home Apartment   Home Layout One level;Stairs to enter with rails  (2 SKY )   Home Equipment Walker;Cane;Wheelchair-manual  (rollator )   Additional Comments Patient reports living with supportive son who is able to assist her as needed, but patient states her son has recently returned to work so patient is alone during the day  Patient has limited/no sulcal support when son is at work   Prior Function   Level of [place] Needs assistance with ADLs and functional mobility   Lives With Son   Receives Help From Family   ADL Assistance Needs assistance   Falls in the last 6 months 1 to 4  (1 as per pt )   Comments Patient reports the use of a Rollator for ambulation prior to admission   Restrictions/Precautions   Weight Bearing Precautions Per Order No   Other Precautions Cognitive; Chair Alarm; Bed Alarm;Multiple lines; Fall Risk;Pain   General   Additional Pertinent History Patient with history of CVA    Family/Caregiver Present No   Cognition   Overall Cognitive Status Impaired   Arousal/Participation Alert   Attention Attends with cues to redirect   Orientation Level Oriented X4   Memory Decreased recall of recent events;Decreased recall of precautions   Following Commands Follows one step commands with increased time or repetition   RUE Assessment   RUE Assessment WFL   LUE Assessment   LUE Assessment WFL   RLE Assessment   RLE Assessment WFL   Strength RLE   RLE Overall Strength 4-/5   LLE Assessment   LLE Assessment WFL   Strength LLE   LLE Overall Strength 3+/5   Bed Mobility   Supine to Sit 4  Minimal assistance   Additional items Assist x 1; Increased time required;Verbal cues   Transfers   Sit to Stand 4  Minimal assistance   Additional items Assist x 1; Increased time required;Verbal cues   Stand to Sit 4  Minimal assistance   Additional items Assist x 1; Increased time required;Verbal cues   Additional Comments VC and TC needed for hand placement and safety during transfers    Ambulation/Elevation   Gait pattern Excessively slow; Short stride; Foward flexed; Inconsistent zack;Decreased foot clearance   Gait Assistance 3  Moderate assist   Additional items Assist x 1   Assistive Device Rolling walker   Distance 5ft   (limited by fatigue )   Balance   Static Sitting Fair   Static Standing Poor +   Ambulatory Poor +   Endurance Deficit   Endurance Deficit Yes   Endurance Deficit Description Fatigue, pain    Activity Tolerance   Activity Tolerance Patient limited by fatigue;Patient limited by pain   Medical Staff Made Aware Oran Holter, [facility]    Nurse Made Aware Patient appropriate to be seen and mobilized per nursing   Assessment   Prognosis Good   Problem List Decreased strength;Decreased endurance; Impaired balance;Decreased mobility; Decreased cognition; Impaired judgement;Decreased safety awareness;Pain   Assessment Pt is 60 y o  female seen for PT evaluation at  [facility] on 8/17/2021  Two pt identifiers were used to confirm  Pt presented w/ inability to physically manage at home    Per H&P patient's son who normally assist patient will be returning to work and patient will be alone during the day and patient is unable to physically manage without assistance  Pt with a primary dx of:  Ambulatory dysfunction, and other active problems including history of CVA, tardive dyskinesia, and general anxiety disorder, esophageal reflux, chronic pain syndrome  PT now consulted for assessment of mobility and d/c needs  Pt with Up with assistance orders  Pts current co morbidities affecting treatment include: Anxiety, bipolar 2 disorder, depression, fibromyalgia, hard of hearing, HTN, left-sided weakness, panic attack, PTSD, history of stroke, urinary incontinence, and personal factors including being alone at times at home  Pts current clinical presentation is Unstable/ Unpredictable (high complexity) due to Ongoing medical management for primary dx, Decreased activity tolerance compared to baseline, Fall risk, Increased assistance needed from caregiver at current time, Cog status, Continuous pulse oximetry monitoring     Upon evaluation, pt currently is requiring Min Ax1 for bed mobility; Min Ax1 for transfers and Mod Ax1 for ambulation w/ RW  Pt presents at PT eval functioning below baseline and currently w/ overall mobility deficits 2* to: BLE weakness, impaired balance, decreased endurance, gait deviations, pain, decreased activity tolerance compared to baseline, decreased safety awareness, impaired judgement, fall risk, decreased cognition  Pt currently at a fall risk 2* to impairments listed above  Based on the aforementioned PT evaluation, pt will continue to benefit from skilled Acute PT interventions to address stated impairments; to maximize functional mobility; for ongoing pt/ family training; and DME needs  At conclusion of PT session pt returned back in chair with phone and call bell within reach  Pt denies any further questions at this time  PT is currently recommending Rehab  PT will continue to follow during hospital stay     Barriers to Discharge Inaccessible home environment;Decreased caregiver support   Goals   Patient Goals " to go to rehab"   STG Expiration Date 08/28/21   Short Term Goal #1 In 10 days pt will complete: 1) Bed mobility skills with mod I to increase safety and independence as well as decrease caregiver burden  2) Functional transfers with mod I to promote increased independence, safety, and QOL  3) Ambulate 150' using least restrictive AD with mod I without LOB and stable vitals so that pt can negotiate previous living environment safely and promote independence with functional mobility and return to PLOF  4) Stair training up/ down 2 step/s using rail/s with mod I so that pt can enter/negotiate previous living environment safely and decrease fall risk  5) Improve balance grades by 1/2 grade to increase safety with all mobility and decrease fall risk  6) Improve BLE strength by 1/2 grade to help increase overall functional mobility and decrease fall risk  Plan   Treatment/Interventions Functional transfer training;LE strengthening/ROM; Elevations; Therapeutic exercise; Endurance training;Patient/family training;Equipment eval/education; Bed mobility;Gait training;Spoke to nursing;OT   PT Frequency Other (Comment)  (3-5x a week )   Recommendation   PT Discharge Recommendation Post acute rehabilitation services   Equipment Recommended [facility] Recommended Wheeled walker   PT - OK to Discharge Yes  (to rehab when medically cleared )   AM-PAC Basic Mobility Inpatient   Turning in Bed Without Bedrails 3   Lying on Back to Sitting on Edge of Flat Bed 3   Moving Bed to Chair 3   Standing Up From Chair 3   Walk in Room 2   Climb 3-5 Stairs 2   Basic Mobility Inpatient Raw Score 16   Basic Mobility Standardized Score 38 32   Modified Candler Scale   Modified Candler Scale 4   Barthel Index   Feeding 10   Bathing 0   Grooming Score 5   Dressing Score 5   Bladder Score 10   Bowels Score 10   Toilet Use Score 5   Transfers (Bed/Chair) Score 10   Mobility (Level Surface) Score 0   Stairs Score 0   Barthel Index Score 55   Portions of the documentation may have been created using voice recognition software  Occasional wrong word or sound alike substitutions may have occurred due to the inherent limitations of the voice recognition software  Read the chart carefully and recognize, using context, where substitutions have occurred      Mila Bueno, PT, DPT

## 2021-08-18 NOTE — CASE MANAGEMENT
Signed MA-51, PASRR, consents, and supporting documentation faxed to [facility] to begin options process  CM requested a psych consult from [address] [place] [address]  CM department to follow for county determination

## 2021-08-18 NOTE — CASE MANAGEMENT
Pt is not a <30 day readmission or current bundle  Risk of unplanned readmission score is unavailable at this time  Pt admitted due to leg pain, anxiety, and ambulatory dysfunction  Pt receiving   Pt documented as alert and oriented x 4  CM met with pt at bedside to introduce CM role and begin discharge planning  Pt lives with her son, Ed Hernandez ([phone]) in a 1st floor apartment that has 2 SKY  Pt reports requiring assistance from her son with ADLs PTA, pt uses a rolling walker for ambulation  Pt reports [facility] of [facility] and [facility] of STR at [facility]  Pt has a recent history of inpatient [address] treatment at [facility] from 7/15 21 - 8/5/21  Pt reports that she has a psychiatrist that she sees monthly for medication management  No reported history of D/A treatment  Pt receives SSI benefits and reports using [employer] for transportation to appointments  PCP is Dr Eleazar Naqvi ([phone]) and pt uses [facility] pharmacy in [place] for prescriptions  CM reviewed therapy's recommendation for STR with pt who is in agreement with recommendation  All questions answered related to acute vs sub-acute  Pt requesting referral to [facility], referral placed via [address]  Sub-acute list provided for additional choices  Pt needs to be optioned through the [facility] due to recent IP [address] stay, MA-51 signed by [address] attending  CM reviewed d/c planning process including the following: identifying help at home, patient preference for d/c planning needs, Discharge Lounge, [facility] Meds to Bed program, availability of treatment team to discuss questions or concerns patient and/or family may have regarding understanding medications and recognizing signs and symptoms once discharged  CM also encouraged patient to follow up with all recommended appointments after discharge  Patient advised of importance for patient and family to participate in managing patients medical well being

## 2021-08-18 NOTE — OCCUPATIONAL THERAPY NOTE
Occupational Therapy Evaluation     Patient Name: Robin Sampson  DNGIG'H Date: 2021  Problem List  Principal Problem:    Ambulatory dysfunction  Active Problems:    Chronic bilateral low back pain with bilateral sciatica    Chronic pain syndrome    Anxiety    Esophageal reflux    Generalized anxiety disorder    Tardive dyskinesia    History of CVA (cerebrovascular accident)    Mixed hyperlipidemia    Past Medical History  Past Medical History:   Diagnosis Date    Acid reflux     Anxiety     RESOLVED: [id number]    Arthritis     Bipolar 2 disorder (HCC)     FOLLOWS WITH PSYCHIATRIST  CONTINUE LAMOTRIGINE; RESOLVED: [id number]    Depression     Familial tremor     both hands    Fibromyalgia     LAST ASSESSED: [id number]    Hearing aid worn     left ear    [place] (hard of hearing)     left ear    Hypertension     Left-sided weakness     Lower back pain     Memory loss of unknown cause     long and short term    Migraine     Obesity     Obesity, Class II, BMI 35-39 9     Overactive bladder     Panic attack     Post traumatic stress disorder     Seasonal allergies     Stroke [facility])     questionable stroke 2009    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    CONT ASA 81 MG DAILY; RESOLVED: [id number]    Urinary incontinence     Wears dentures     partial lower / full upper    Wears glasses      Past Surgical History  Past Surgical History:   Procedure Laterality Date    BACK SURGERY       SECTION      COLONOSCOPY      RESOLVED: [id number]    EAR SURGERY      EGD      HYSTERECTOMY      MYRINGOTOMY W/ TUBES Left     NECK SURGERY  2019    CA CYSTOURETHROSCOPY N/A 2016    Procedure: CYSTOSCOPY, BOTOX INJECTION;  Surgeon: aFbiano Goldman MD;  [facility];  Service: Gynecology    CA IMPLANT SPINAL NEUROSTIM/ Right 2/10/2021    Procedure: REPLACEMENT IMPLANTABLE PULSE GENERATOR DORSAL SPINAL COLUMN STIMULATOR, RIGHT;  Surgeon: Ainsley Eli MD;  [facility];  Service: Neurosurgery    AL PERCUT IMPLNT [address] Right 7/28/2020    Procedure: INSERTION THORACIC DORSAL COLUMN SPINAL CORD STIMULATOR PERCUTANEOUS W IMPLANTABLE PULSE GENERATOR, RIGHT;  Surgeon: Ainsley Eli MD;  [facility];  Service: Neurosurgery    TONSILLECTOMY     [address] [place]    UPPER GASTROINTESTINAL ENDOSCOPY  09/2020 08/18/21 1017   OT Last Visit   OT Visit Date 08/18/21   Note Type   Note type Evaluation   Pain Assessment   Pain Assessment Tool FLACC   Pain Rating: FLACC (Rest) - Face 0   Pain Rating: FLACC (Rest) - Legs 0   Pain Rating: FLACC (Rest) - Activity 0   Pain Rating: FLACC (Rest) - Cry 0   Pain Rating: FLACC (Rest) - Consolability 0   Score: FLACC (Rest) 0   Pain Rating: FLACC (Activity) - Face 1   Pain Rating: FLACC (Activity) - Legs 0   Pain Rating: FLACC (Activity) - Activity 1   Pain Rating: FLACC (Activity) - Cry 1   Pain Rating: FLACC (Activity) - Consolability 0   Score: FLACC (Activity) 3   Home Living   Type of Home Apartment   Home Layout One level;Stairs to enter with rails; Able to live on main level with bedroom/bathroom   Bathroom Shower/Tub Tub/shower unit   [place]; Wheelchair-manual;Other (Comment)  (rollator)   Prior Function   Level of [place] Needs assistance with ADLs and functional mobility   Lives With Son   Receives Help From Family   ADL Assistance Needs assistance   IADLs Needs assistance   Falls in the last 6 months 1 to 4  (1)   Vocational On disability   Lifestyle   Autonomy pta pt reports son assists w/ ADLs/IADLs   pt reports using rollator for functional mobility PTA   Reciprocal Relationships supportive son- reports son is going back to work shortly and she will be home alone   Service to Others not currentlying working   Intrinsic Gratification enjoys watching tv   Psychosocial   Psychosocial (WDL) WDL   Subjective   Subjective "I need help with everything right now"   ADL   Where Assessed Chair   Eating Assistance 5  Supervision/Setup   Grooming Assistance 5  Supervision/Setup   UB Bathing Assistance 4  Minimal Assistance   LB Bathing Assistance 3  Moderate Assistance   UB Dressing Assistance 4  Minimal [address] 3  Moderate [address]  3  Moderate Assistance   Bed Mobility   Sit to Supine 3  Moderate assistance   Additional items Assist x 1;LE management   Transfers   Sit to Stand 4  Minimal assistance   Additional items Assist x 1; Increased time required   Stand to Sit 4  Minimal assistance   Additional items Assist x 1; Increased time required   Functional Mobility   Functional Mobility 4  Minimal assistance   Additional items Rolling walker   Balance   Static Sitting Fair   Dynamic Sitting Fair -   Static Standing Poor +   Dynamic Standing Poor +   Ambulatory Poor +   Activity Tolerance   Activity Tolerance Patient limited by fatigue   Nurse Made Aware okay to see per RN   RUE Assessment   RUE Assessment WFL   LUE Assessment   LUE Assessment WFL   Hand Function   Gross Motor Coordination Functional   Fine Motor Coordination Functional   Cognition   Overall Cognitive Status Impaired   Arousal/Participation Cooperative   Attention Attends with cues to redirect   Orientation Level Oriented X4   Memory Decreased recall of recent events;Decreased recall of precautions   Following Commands Follows multistep commands with increased time or repetition   Comments pt slow to process commands at times   Assessment   Limitation Decreased ADL status; Decreased Safe judgement during ADL;Decreased endurance;Decreased cognition;Decreased high-level ADLs; Decreased self-care trans   Prognosis Good   Assessment Pt is a 60 y o  YO  female admitted to [facility] on 8/17/2021 w/ ambulatory dysfxn   Pt  has a past medical history of Acid reflux, Anxiety, Arthritis, Bipolar 2 disorder ([facility] [address] ), Depression, Familial tremor, Fibromyalgia, Hearing aid worn, [place] (hard of hearing), Hypertension, Left-sided weakness, Lower back pain, Memory loss of unknown cause, Migraine, Obesity, Obesity, Class II, BMI 35-39 9, Overactive bladder, Panic attack, Post traumatic stress disorder, Seasonal allergies, Stroke (HCC), Thrombosis of cerebral arteries, Urinary incontinence, Wears dentures, and Wears glasses  Pt with active OT orders and up with assistance  orders    Pt resides in a apt with her son  Pt reports assist for ADls and IADLs from son and used a rollator  Currently pt is Min A for Ub ADls and transfers/functional mobility  Pt is Mod A for LB ADLs  Pt is limited at this time 2*: endurance, activity tolerance, functional mobility, unsupportive home environment, decreased I w/ ADLS/IADLS, strength, cognitive impairments, decreased safety awareness and decreased insight into deficits  The following Occupational Performance Areas to address include: grooming, bathing/shower, toilet hygiene, dressing and functional mobility  Based on the aforementioned OT evaluation, functional performance deficits, and assessments, pt has been identified as a high complexity evaluation  From OT standpoint, anticipate d/c STR  Pt to continue to benefit from acute immediate OT services to address the following goals 2-3x/week to  w/in 10-14 days:  The patient's raw score on the AM-PAC Daily Activity inpatient short form is 18, standardized score is 38 66, less than 39 4  Patients at this level are likely to benefit from discharge to post-acute rehabilitation services  Please refer to the recommendation of the Occupational Therapist for safe discharge planning  Goals   Patient Goals go home   LTG Time Frame 10-14   Plan   Treatment Interventions ADL retraining;Functional transfer training; Endurance training;Cognitive reorientation;Patient/family training; Compensatory technique education;Continued evaluation   Goal Expiration Date 21   OT Frequency 2-3x/wk   Recommendation   OT Discharge Recommendation Post acute rehabilitation services  (will likely require LTC)   OT - OK to Discharge Yes   AM-PAC Daily Activity Inpatient   Lower Body Dressing 2   Bathing 2   Toileting 3   Upper Body Dressing 3   Grooming 4   Eating 4   Daily Activity Raw Score 18   Daily Activity Standardized Score (Calc for Raw Score >=11) 38 66   AM-PAC Applied Cognition Inpatient   Following a Speech/Presentation 3   Understanding Ordinary Conversation 4   Taking Medications 3   Remembering Where Things Are Placed or Put Away 3   Remembering List of 4-5 Errands 2   Taking Care of Complicated Tasks 2   Applied Cognition Raw Score 17   Applied Cognition Standardized Score 36 52   Modified [place] Scale   Modified [place] Scale 4     GOALS    1) Pt will increase activity tolerance to G for 30 min txment sessions    2) Pt will complete UB/LB dressing/self care w/ mod I using adaptive device and DME as needed    3) Pt will complete bathing w/ Mod I w/ use of AE and DME as needed    4) Pt will complete toileting w/ mod I w/ G hygiene/thoroughness using DME as needed    5) Pt will improve functional transfers to Mod I on/off all surfaces using DME as needed w/ G balance/safety     6) Pt will improve functional mobility during ADL/IADL/leisure tasks to Mod I using DME as needed w/ G balance/safety     7) Pt will participate in simulated IADL management task to increase independence to  w/ G safety and endurance    8) Pt will demonstrate G carryover of pt/caregiver education and training as appropriate  9) Pt will demonstrate 100% carryover of energy conservation techniques t/o functional I/ADL/leisure tasks w/o cues s/p skilled education    10) Pt will independently identify and utilize 2-3 coping strategies to increase positive affect and promote overall well-being      11) Pt will engage in ongoing cognitive assessment w/ G participation to assist w/ safe d/c planning/recommendations    Arlene MS Maude, OTR/L

## 2021-08-18 NOTE — PROGRESS NOTES
Outpatient Care Management Note:    Received ADT alert patient admitted at [facility] for ambulatory dysfunction and requested placement to skilled nursing facility

## 2021-08-18 NOTE — PLAN OF CARE
Problem: OCCUPATIONAL THERAPY ADULT  Goal: Performs self-care activities at highest level of function for planned discharge setting  See evaluation for individualized goals  Description: Treatment Interventions: ADL retraining, Functional transfer training, Endurance training, Cognitive reorientation, Patient/family training, Compensatory technique education, Continued evaluation          See flowsheet documentation for full assessment, interventions and recommendations  Note: Limitation: Decreased ADL status, Decreased Safe judgement during ADL, Decreased endurance, Decreased cognition, Decreased high-level ADLs, Decreased self-care trans  Prognosis: Good  Assessment: Pt is a 60 y o  YO  female admitted to [facility] on 8/17/2021 w/ ambulatory dysfxn  Pt  has a past medical history of Acid reflux, Anxiety, Arthritis, Bipolar 2 disorder ([facility] [address] ), Depression, Familial tremor, Fibromyalgia, Hearing aid worn, [place] (hard of hearing), Hypertension, Left-sided weakness, Lower back pain, Memory loss of unknown cause, Migraine, Obesity, Obesity, Class II, BMI 35-39 9, Overactive bladder, Panic attack, Post traumatic stress disorder, Seasonal allergies, Stroke ([facility] [address] ), Thrombosis of cerebral arteries, Urinary incontinence, Wears dentures, and Wears glasses  Pt with active OT orders and up with assistance  orders    Pt resides in a apt with her son  Pt reports assist for ADls and IADLs from son and used a rollator  Currently pt is Min A for Ub ADls and transfers/functional mobility  Pt is Mod A for LB ADLs  Pt is limited at this time 2*: endurance, activity tolerance, functional mobility, unsupportive home environment, decreased I w/ ADLS/IADLS, strength, cognitive impairments, decreased safety awareness and decreased insight into deficits  The following Occupational Performance Areas to address include: grooming, bathing/shower, toilet hygiene, dressing and functional mobility   Based on the aforementioned OT evaluation, functional performance deficits, and assessments, pt has been identified as a high complexity evaluation  From OT standpoint, anticipate d/c STR  Pt to continue to benefit from acute immediate OT services to address the following goals 2-3x/week to  w/in 10-14 days:  The patient's raw score on the AM-PAC Daily Activity inpatient short form is 18, standardized score is 38 66, less than 39 4  Patients at this level are likely to benefit from discharge to post-acute rehabilitation services  Please refer to the recommendation of the Occupational Therapist for safe discharge planning  OT Discharge Recommendation: Post acute rehabilitation services (will likely require LTC)  OT - OK to Discharge:  Yes

## 2021-08-18 NOTE — PLAN OF CARE
Problem: PHYSICAL THERAPY ADULT  Goal: Performs mobility at highest level of function for planned discharge setting  See evaluation for individualized goals  Description: Treatment/Interventions: Functional transfer training, LE strengthening/ROM, Elevations, Therapeutic exercise, Endurance training, Patient/family training, Equipment eval/education, Bed mobility, Gait training, Spoke to nursing, OT  Equipment Recommended: [place]       See flowsheet documentation for full assessment, interventions and recommendations  Note: Prognosis: Good  Problem List: Decreased strength, Decreased endurance, Impaired balance, Decreased mobility, Decreased cognition, Impaired judgement, Decreased safety awareness, Pain  Assessment: Pt is 60 y o  female seen for PT evaluation at  [facility] on 8/17/2021  Two pt identifiers were used to confirm  Pt presented w/ inability to physically manage at home  Per H&P patient's son who normally assist patient will be returning to work and patient will be alone during the day and patient is unable to physically manage without assistance  Pt with a primary dx of:  Ambulatory dysfunction, and other active problems including history of CVA, tardive dyskinesia, and general anxiety disorder, esophageal reflux, chronic pain syndrome  PT now consulted for assessment of mobility and d/c needs  Pt with Up with assistance orders  Pts current co morbidities affecting treatment include: Anxiety, bipolar 2 disorder, depression, fibromyalgia, hard of hearing, HTN, left-sided weakness, panic attack, PTSD, history of stroke, urinary incontinence, and personal factors including being alone at times at home   Pts current clinical presentation is Unstable/ Unpredictable (high complexity) due to Ongoing medical management for primary dx, Decreased activity tolerance compared to baseline, Fall risk, Increased assistance needed from caregiver at current time, Cog status, Continuous pulse oximetry monitoring     Upon evaluation, pt currently is requiring Min Ax1 for bed mobility; Min Ax1 for transfers and Mod Ax1 for ambulation w/ RW  Pt presents at PT eval functioning below baseline and currently w/ overall mobility deficits 2* to: BLE weakness, impaired balance, decreased endurance, gait deviations, pain, decreased activity tolerance compared to baseline, decreased safety awareness, impaired judgement, fall risk, decreased cognition  Pt currently at a fall risk 2* to impairments listed above  Based on the aforementioned PT evaluation, pt will continue to benefit from skilled Acute PT interventions to address stated impairments; to maximize functional mobility; for ongoing pt/ family training; and DME needs  At conclusion of PT session pt returned back in chair with phone and call bell within reach  Pt denies any further questions at this time  PT is currently recommending Rehab  PT will continue to follow during hospital stay  Barriers to Discharge: Inaccessible home environment, Decreased caregiver support        PT Discharge Recommendation: Post acute rehabilitation services     PT - OK to Discharge: Yes (to rehab when medically cleared )    See flowsheet documentation for full assessment

## 2021-08-18 NOTE — ASSESSMENT & PLAN NOTE
Patient has been on chronic opioids for low back and leg pain  She was seen in the clinic and discussed opioid tapering  Most recent opioid prescription per PDMP was for morphine 15 mg for 10 days (20 tablets)  Per chart review, concern for polypharmacy and suspicion of drug-seeking  · Scheduled Tylenol  · Lidocaine patch/Voltaren gel  · Added Lyrica 100 mg b i d  8/19 --> frequency increased to t i d  8/23  · Added Oxy IR 5 mg q 12h p r n  moderate pain 8/19-->may consider changing to q8h depending on patient usage    · Added Aqua K 8/21

## 2021-08-18 NOTE — ED ATTENDING ATTESTATION
Final Diagnosis:  1  Chronic leg pain    2  Anxiety    3  Bipolar II disorder ([address] )    4  Post traumatic stress disorder    5  Panic disorder without agoraphobia           I, Dereck Pandya MD, saw and evaluated the patient  All available labs and X-rays were ordered by me or the resident and have been reviewed by myself  I discussed the patient with the resident / non-physician and agree with the resident's / non-physician practitioner's findings and plan as documented in the resident's / non-physician practicitioner's note, except where noted  At this point, I agree with the current assessment done in the ED  I was present during key portions of all procedures performed unless otherwise stated  Chief Complaint   Patient presents with    Anxiety     pt anxious and afraid to be at home by herself when her son goes back to work  pt wants to go to a rehab facility   Leg Pain     pt with hx of arthritis and in need of knee replacement c/o bilateral leg pain  This is a 60 y o  female presenting for evaluation of nursing home placement  Son can no longer care for her  Severe anxiety  No acute complaints otherwise  No current panic episode  PMH:   has a past medical history of Acid reflux, Anxiety, Arthritis, Bipolar 2 disorder ([address] ), Depression, Familial tremor, Fibromyalgia, Hearing aid worn, [place] (hard of hearing), Hypertension, Left-sided weakness, Lower back pain, Memory loss of unknown cause, Migraine, Obesity, Obesity, Class II, BMI 35-39 9, Overactive bladder, Panic attack, Post traumatic stress disorder, Seasonal allergies, Stroke ([address] ), Thrombosis of cerebral arteries, Urinary incontinence, Wears dentures, and Wears glasses  PSH:   has a past surgical history that includes Hysterectomy (); Tubal ligation (); Myringotomy w/ tubes (Left); Tonsillectomy; Colonoscopy; pr cystourethroscopy (N/A, 2016);  section (); EAR SURGERY; Back surgery;  Neck surgery (2019); pr percut implnt neuroelect,epidural (Right, 7/28/2020); EGD; pr implant spinal neurostim/ (Right, 2/10/2021); and Upper gastrointestinal endoscopy (09/2020)  Social:  Social History     Substance and Sexual Activity   Alcohol Use Not Currently    Comment: 2 x year; being a social drinker as per all scripts      Social History     Tobacco Use   Smoking Status Never Smoker   Smokeless Tobacco Never Used     Social History     Substance and Sexual Activity   Drug Use No     PE:  Vitals:    08/19/21 1243 08/19/21 1244 08/19/21 1546 08/19/21 2347   BP: 148/96 148/96 138/89 134/86   BP Location:    Right arm   Pulse: 60 64 60 66   Resp:   16 16   Temp:   98 6 °F (37 °C) 98 2 °F (36 8 °C)   TempSrc:    Oral   SpO2:  99% 99% 96%   Weight:       General: VS reviewed  Appears in NAD  awake, alert  Well-nourished, well-developed  Appears stated age  Speaking normally in full sentences  Head: Normocephalic, atraumatic  Eyes: EOM-I  No diplopia  No hyphema  No subconjunctival hemorrhages  Symmetrical lids  ENT: Atraumatic external nose and ears  MMM  No malocclusion  No stridor  Normal phonation  No drooling  Normal swallowing  Neck: No JVD  CV: No pallor noted  Lungs:   No tachypnea  No respiratory distress  MSK:   FROM spontaneously  Skin: Dry, intact  Neuro: Awake, alert, GCS15, CN II-XII grossly intact  Motor grossly intact  Psychiatric/Behavioral: Appropriate mood and affect   Exam: deferred  A:  - Nursing home placement  P:  - Admit for placement  - 13 point ROS was performed and all are normal unless stated in the history above  - Nursing note reviewed  Vitals reviewed  - Orders placed by myself and/or advanced practitioner / resident     - Previous chart was reviewed  - No language barrier    - History obtained from patient  - There are no limitations to the history obtained  - Critical care time: Not applicable for this patient       Code Status: Level 1 - Full Code  Advance Directive and Living Will:      Power of :    POLST:      Medications   ketorolac (TORADOL) injection 15 mg (15 mg Intravenous Given 8/19/21 2156)   amLODIPine (NORVASC) tablet 5 mg (5 mg Oral Given 8/19/21 0827)   atorvastatin (LIPITOR) tablet 40 mg (40 mg Oral Given 8/19/21 1550)   busPIRone (BUSPAR) tablet 15 mg (15 mg Oral Given 8/19/21 2119)   aspirin chewable tablet 81 mg (81 mg Oral Given 8/19/21 0828)   ascorbic acid (VITAMIN C) tablet 500 mg (500 mg Oral Given 8/19/21 1830)   ferrous sulfate tablet 325 mg (325 mg Oral Given 8/05/47 0731)   folic acid (FOLVITE) tablet 1,000 mcg (1,000 mcg Oral Given 8/19/21 0827)   hydrOXYzine HCL (ATARAX) tablet 50 mg (has no administration in time range)   lithium carbonate (LITHOBID) CR tablet 300 mg (300 mg Oral Given 8/19/21 2355)   mirtazapine (REMERON) tablet 7 5 mg (0 mg Oral Return to [facility] 8/19/21 2124)   pantoprazole (PROTONIX) EC tablet 20 mg (20 mg Oral Given 8/20/21 0511)   PARoxetine (PAXIL) tablet 60 mg (60 mg Oral Given 8/19/21 2119)   prazosin (MINIPRESS) capsule 2 mg (2 mg Oral Given 8/19/21 1245)   propranolol (INDERAL) tablet 20 mg (20 mg Oral Given 8/20/21 0626)   QUEtiapine (SEROquel) tablet 50 mg (50 mg Oral Given 8/19/21 2119)   senna-docusate sodium (SENOKOT S) 8 6-50 mg per tablet 1 tablet (has no administration in time range)   Valbenazine Tosylate CAPS 80 mg (80 mg Oral Given 8/19/21 0830)   acetaminophen (TYLENOL) tablet 650 mg (650 mg Oral Given 8/20/21 0511)   enoxaparin (LOVENOX) subcutaneous injection 40 mg (40 mg Subcutaneous Given 8/19/21 0829)   lidocaine (LIDODERM) 5 % patch 1 patch (1 patch Topical Patch Removed 8/19/21 2016)   Diclofenac Sodium (VOLTAREN) 1 % topical gel 2 g (2 g Topical Given 8/19/21 2120)   ondansetron (ZOFRAN-ODT) dispersible tablet 4 mg (has no administration in time range)   LORazepam (ATIVAN) tablet 0 5 mg (0 5 mg Oral Given 8/20/21 0626)   pregabalin (LYRICA) capsule 100 mg (100 mg Oral Given 8/19/21 1830)   oxyCODONE (ROXICODONE) IR tablet 5 mg (5 mg Oral Given 8/20/21 0511)   diazepam (VALIUM) tablet 5 mg (5 mg Oral Given 8/17/21 2329)   morphine (MS CONTIN) ER tablet 15 mg (15 mg Oral Given 8/18/21 0437)   potassium chloride (K-DUR,KLOR-CON) CR tablet 40 mEq (40 mEq Oral Given 8/18/21 1718)     No orders to display     Orders Placed This Encounter   Procedures    Comprehensive metabolic panel    CBC and differential    Basic metabolic panel    Basic metabolic panel    Diet Regular; Regular House    Nursing communication Continue IV as ordered     Indira Clancy Notify admitting physician    Notify admitting physician on arrival    Vital Signs per unit routine    Up with assistance    Insert peripheral IV    Maintain IV access    Apply SCD or Foot pumps    Level 1-Full Code: all life saving measures are indicated    Inpatient consult to Case Management    Inpatient consult to Psychiatry    OT eval and treat    PT eval and treat    Place in Observation     Labs Reviewed   CBC AND DIFFERENTIAL       Result Value Ref Range Status    WBC 5 16  4 31 - 10 16 Thousand/uL Final    RBC 4 36  3 81 - 5 12 Million/uL Final    Hemoglobin 13 1  11 5 - 15 4 g/dL Final    Hematocrit 39 3  34 8 - 46 1 % Final    MCV 90  82 - 98 fL Final    MCH 30 0  26 8 - 34 3 pg Final    MCHC 33 3  31 4 - 37 4 g/dL Final    RDW 13 8  11 6 - 15 1 % Final    MPV 9 6  8 9 - 12 7 fL Final    Platelets 641  683 - 390 Thousands/uL Final    nRBC 0  /100 WBCs Final    Neutrophils Relative 62  43 - 75 % Final    Immat GRANS % 0  0 - 2 % Final    Lymphocytes Relative 24  14 - 44 % Final    Monocytes Relative 10  4 - 12 % Final    Eosinophils Relative 3  0 - 6 % Final    Basophils Relative 1  0 - 1 % Final    Neutrophils Absolute 3 24  1 85 - 7 62 Thousands/µL Final    Immature Grans Absolute 0 01  0 00 - 0 20 Thousand/uL Final    Lymphocytes Absolute 1 25  0 60 - 4 47 Thousands/µL Final    Monocytes Absolute 0 50  0 17 - 1 22 Thousand/µL Final    Eosinophils Absolute 0 13  0 00 - 0 61 Thousand/µL Final    Basophils Absolute 0 03  0 00 - 0 10 Thousands/µL Final     Time reflects when diagnosis was documented in both MDM as applicable and the Disposition within this note     Time User Action Codes Description Comment    8/18/2021 12:31 AM Lucian Remy Add [M79 [id number],  G89 29] Chronic leg pain     8/18/2021 12:31 AM Lucian Remy Add [F41 9] Anxiety     8/18/2021  4:07 PM Elsaturninoia Ivan Add [F31 81] Bipolar II disorder ([facility] [address] )     8/18/2021  4:07 PM Elveria Braddock Add [F43 10] Post traumatic stress disorder     8/18/2021  4:07 PM Elveria Braddock Add [F41 0] Panic disorder without agoraphobia       ED Disposition     ED Disposition Condition Date/Time Comment    Admit Stable Wed Aug 18, 2021 12:31 AM Case was discussed with SOD and the patient's admission status was agreed to be Admission Status: observation status to the service of Dr Ishan Wood   Follow-up Information    None       Current Discharge Medication List      CONTINUE these medications which have NOT CHANGED    Details   amLODIPine (NORVASC) 5 mg tablet Take 1 tablet (5 mg total) by mouth daily  Qty: 90 tablet, Refills: 3    Associated Diagnoses: Hypertension, unspecified type      atorvastatin (LIPITOR) 40 mg tablet TAKE 1 TABLET BY MOUTH DAILY WITH DINNER  Qty: 30 tablet, Refills: 0    Associated Diagnoses: Altered mental status      !! busPIRone (BUSPAR) 10 mg tablet TAKE 2 TABLETS (20 MG TOTAL) BY MOUTH 3 (THREE) TIMES A DAY  [facility] Aspirin Adult Low Dose 81 MG chewable tablet CHEW 1 TABLET BY MOUTH DAILY  Qty: 30 tablet, Refills: 0    Associated Diagnoses:  Altered mental status      [facility] Vitamin C 500 MG tablet TAKE 1 TABLET BY MOUTH TWICE A DAY  Qty: 60 tablet, Refills: 0    Associated Diagnoses: Primary osteoarthritis of right knee; Preop testing      Diclofenac Sodium (VOLTAREN) 1 % Apply 2 g topically 4 (four) times a day  Qty: 150 g, Refills: 0    Associated Diagnoses: Bilateral chronic knee pain      ferrous sulfate 324 (65 Fe) mg TAKE 1 TABLET (324 MG TOTAL) BY MOUTH 2 (TWO) TIMES A DAY BEFORE MEALS  Qty: 60 tablet, Refills: 1    Associated Diagnoses: Primary osteoarthritis of right knee; Preop testing      folic acid (FOLVITE) 1 mg tablet TAKE 1 TABLET BY MOUTH EVERY DAY  Qty: 30 tablet, Refills: 1    Associated Diagnoses: Primary osteoarthritis of right knee; Preop testing      hydrOXYzine HCL (ATARAX) 50 mg tablet Take 50 mg by mouth 3 (three) times a day as needed for itching      lithium carbonate (LITHOBID) 300 mg CR tablet Take 1 tablet (300 mg total) by mouth daily at bedtime  Qty: 30 tablet, Refills: 0    Associated Diagnoses: Medical clearance for psychiatric admission      mirtazapine (REMERON) 7 5 MG tablet Take 1 tablet (7 5 mg total) by mouth daily at bedtime as needed (insomnia)  Qty: 30 tablet, Refills: 0    Associated Diagnoses: Medical clearance for psychiatric admission      pantoprazole (PROTONIX) 20 mg tablet TAKE 1 TABLET (20 MG TOTAL) BY MOUTH DAILY IN THE EARLY MORNING  Qty: 30 tablet, Refills: 0    Associated Diagnoses: Nausea      PARoxetine (PAXIL) 30 mg tablet Take 2 tablets (60 mg total) by mouth daily  Qty: 60 tablet, Refills: 0    Associated Diagnoses: Medical clearance for psychiatric admission      potassium chloride (K-DUR,KLOR-CON) 20 mEq tablet Take 1 tablet (20 mEq total) by mouth daily  Qty: 30 tablet, Refills: 0    Associated Diagnoses: Medical clearance for psychiatric admission      prazosin (MINIPRESS) 2 mg capsule TAKE 1 CAPSULE BY MOUTH EVERY DAY  Qty: 30 capsule, Refills: 1    Associated Diagnoses: Hypertension, unspecified type      propranolol (INDERAL) 20 mg tablet Take 1 tablet (20 mg total) by mouth 2 (two) times a day before breakfast and lunch  Qty: 90 tablet, Refills: 0    Associated Diagnoses: Medical clearance for psychiatric admission      QUEtiapine (SEROquel) 25 mg tablet Take 2 tablets (50 mg total) by mouth 4 (four) times daily (after meals and at bedtime)  Qty: 120 tablet, Refills: 0    Associated Diagnoses: Medical clearance for psychiatric admission      senna-docusate sodium (SENOKOT S) 8 6-50 mg per tablet Take 1 tablet by mouth daily as needed for constipation  Qty: 30 tablet, Refills: 0    Associated Diagnoses: Medical clearance for psychiatric admission      Valbenazine Tosylate (Ingrezza) 80 MG CAPS Take 80 mg by mouth daily  Qty: 30 capsule, Refills: 1    Associated Diagnoses: Tardive dyskinesia      !! busPIRone (BUSPAR) 15 mg tablet Take 1 tablet (15 mg total) by mouth 3 (three) times a day  Qty: 90 tablet, Refills: 0    Associated Diagnoses: Bipolar II disorder (HCC)      Diapers & Supplies (Huggies Pull-Ups) MISC Use 3 (three) times a day  Qty: 100 each, Refills: 3    Comments: Please dispense size XXL  Associated Diagnoses: Urge incontinence of urine      naloxone (NARCAN) 4 mg/0 1 mL nasal spray Administer 1 spray into a nostril  If no response after 2-3 minutes, give another dose in the other nostril using a new spray  Qty: 1 each, Refills: 1    Associated Diagnoses: Lumbar radiculopathy; Chronic pain disorder      pregabalin (LYRICA) 100 mg capsule Take 1 capsule (100 mg total) by mouth 2 (two) times a day  Qty: 60 capsule, Refills: 1    Associated Diagnoses: Lumbar radiculopathy; Chronic pain disorder; Chronic pain syndrome; Fibromyalgia; Chronic bilateral low back pain with bilateral sciatica      Respiratory Therapy Supplies (Nebulizer) YUDY Use as needed (Shortness of breath)  Qty: 1 each, Refills: 0    Associated Diagnoses: Dyspnea       !! - Potential duplicate medications found  Please discuss with provider  No discharge procedures on file  Prior to Admission Medications   Prescriptions Last Dose Informant Patient Reported? Taking?    [facility] Aspirin Adult Low Dose 81 MG chewable tablet 8/18/2021 at Unknown time  No Yes   Sig: CHEW 1 TABLET BY MOUTH DAILY   [facility] Vitamin C 500 MG tablet 8/18/2021 at Unknown time  No Yes   Sig: TAKE 1 TABLET BY MOUTH TWICE A DAY   Diapers & Supplies (Huggies Pull-Ups) MISC   No No   Sig: Use 3 (three) times a day   Diclofenac Sodium (VOLTAREN) 1 % 8/18/2021 at Unknown time  No Yes   Sig: Apply 2 g topically 4 (four) times a day   PARoxetine (PAXIL) 30 mg tablet 8/18/2021 at Unknown time  No Yes   Sig: Take 2 tablets (60 mg total) by mouth daily   QUEtiapine (SEROquel) 25 mg tablet 8/18/2021 at Unknown time  No Yes   Sig: Take 2 tablets (50 mg total) by mouth 4 (four) times daily (after meals and at bedtime)   Respiratory Therapy Supplies (Nebulizer) YUDY   No No   Sig: Use as needed (Shortness of breath)   Patient not taking: Reported on 4/20/2021   Valbenazine Tosylate (Ingrezza) 80 MG CAPS 8/18/2021 at Unknown time  No Yes   Sig: Take 80 mg by mouth daily   amLODIPine (NORVASC) 5 mg tablet 8/18/2021 at Unknown time  No Yes   Sig: Take 1 tablet (5 mg total) by mouth daily   atorvastatin (LIPITOR) 40 mg tablet 8/18/2021 at Unknown time  No Yes   Sig: TAKE 1 TABLET BY MOUTH DAILY WITH DINNER   busPIRone (BUSPAR) 10 mg tablet 8/18/2021 at Unknown time  Yes Yes   Sig: TAKE 2 TABLETS (20 MG TOTAL) BY MOUTH 3 (THREE) TIMES A DAY     busPIRone (BUSPAR) 15 mg tablet Not Taking at Unknown time  No No   Sig: Take 1 tablet (15 mg total) by mouth 3 (three) times a day   Patient not taking: Reported on 8/12/2021   ferrous sulfate 324 (65 Fe) mg 8/18/2021 at Unknown time  No Yes   Sig: TAKE 1 TABLET (324 MG TOTAL) BY MOUTH 2 (TWO) TIMES A DAY BEFORE MEALS   folic acid (FOLVITE) 1 mg tablet 8/18/2021 at Unknown time  No Yes   Sig: TAKE 1 TABLET BY MOUTH EVERY DAY   hydrOXYzine HCL (ATARAX) 50 mg tablet 8/18/2021 at Unknown time  Yes Yes   Sig: Take 50 mg by mouth 3 (three) times a day as needed for itching   lithium carbonate (LITHOBID) 300 mg CR tablet 8/18/2021 at Unknown time  No Yes   Sig: Take 1 tablet (300 mg total) by mouth daily at bedtime   mirtazapine (REMERON) 7 5 MG tablet 8/17/2021 at Unknown time  No Yes   Sig: Take 1 tablet (7 5 mg total) by mouth daily at bedtime as needed (insomnia)   naloxone (NARCAN) 4 mg/0 1 mL nasal spray   No No   Sig: Administer 1 spray into a nostril  If no response after 2-3 minutes, give another dose in the other nostril using a new spray  pantoprazole (PROTONIX) 20 mg tablet 8/18/2021 at Unknown time  No Yes   Sig: TAKE 1 TABLET (20 MG TOTAL) BY MOUTH DAILY IN THE EARLY MORNING   potassium chloride (K-DUR,KLOR-CON) 20 mEq tablet 8/18/2021 at Unknown time  No Yes   Sig: Take 1 tablet (20 mEq total) by mouth daily   prazosin (MINIPRESS) 2 mg capsule 8/18/2021 at Unknown time Outside Facility (Specify) No Yes   Sig: TAKE 1 CAPSULE BY MOUTH EVERY DAY   pregabalin (LYRICA) 100 mg capsule   No No   Sig: Take 1 capsule (100 mg total) by mouth 2 (two) times a day   propranolol (INDERAL) 20 mg tablet 8/18/2021 at Unknown time  No Yes   Sig: Take 1 tablet (20 mg total) by mouth 2 (two) times a day before breakfast and lunch   senna-docusate sodium (SENOKOT S) 8 6-50 mg per tablet 8/17/2021 at Unknown time  No Yes   Sig: Take 1 tablet by mouth daily as needed for constipation      Facility-Administered Medications: None       Portions of the record may have been created with voice recognition software  Occasional wrong word or "sound a like" substitutions may have occurred due to the inherent limitations of voice recognition software  Read the chart carefully and recognize, using context, where substitutions have occurred      Electronically signed by:  Dereck Pandya

## 2021-08-18 NOTE — UTILIZATION REVIEW
Initial Clinical Review    Admission: Date/Time/Statement:   Admission Orders (From admission, onward)     Ordered        08/18/21 0031  Place in Observation  Once                   Orders Placed This Encounter   Procedures    Place in Observation     Standing Status:   Standing     Number of Occurrences:   1     Order Specific Question:   Level of Care     Answer:   Med Surg [16]     ED Arrival Information     Expected Arrival Acuity    - 8/17/2021 22:37 Urgent         Means of arrival Escorted by Service Admission type    Walk-In Family Member General Medicine Urgent         Arrival complaint    anxiety attack,leg pain        Chief Complaint   Patient presents with    Anxiety     pt anxious and afraid to be at home by herself when her son goes back to work  pt wants to go to a rehab facility   Leg Pain     pt with hx of arthritis and in need of knee replacement c/o bilateral leg pain  Initial Presentation:   58y Female to ED presents with panic attack and SNF placement  Seen in clinic earlier today with son  Seen in ED on 8/16 for panic attack when her son wanted to leave the house without her  Son is returning to work today on 08/18 after taking leave to help take care of his mother  Pt concerned she is unable to care for self and will fall when son is not there  She uses walker and has wheelchair but unable use to do inadequate space to move around  She is also unable to self propel due to decreased strength  PMH for Extensive psychiatric history on several psychotropic medications, Chronic pain syndrome, opioid dependence, Hypertension, Hyperlipidemia, prior CVA, Tardive dyskinesia, Esophageal reflux  In ED c/o generalized pain and nausea  Admit Observation level of care for Ambulatory dysfunction  decreased physical mobility, at risk for falls  She uses walker and wheelchair at home  Requesting SNF placement  Avoid extremes of blood pressure and avoid hypoglycemia   Keep personal items and call bell close to the patient  Continue home meds  Hold off on opioids at this time due to concern of CNS depression and polypharmacy  Pain control       ED Triage Vitals   Temperature Pulse Respirations Blood Pressure SpO2   08/17/21 2257 08/17/21 2257 08/17/21 2257 08/17/21 2257 08/17/21 2257   98 4 °F (36 9 °C) 81 20 164/99 99 %      Temp src Heart Rate Source Patient Position - Orthostatic VS BP Location FiO2 (%)   -- 08/18/21 0110 08/18/21 0110 08/18/21 0110 --    Monitor Lying Left arm       Pain Score       08/18/21 0121       8          Wt Readings from Last 1 Encounters:   08/17/21 88 8 kg (195 lb 12 3 oz)     Additional Vital Signs:   08/18/21 08:08:54  98 3 °F (36 8 °C)  64  18  130/81  97  98 %  --  --   08/18/21 06:42:27  --  66  --  171/106  Abnormal   128  100 %  --  --   08/18/21 0524  --  60  18  145/80  --  100 %  None (Room air)       Pertinent Labs/Diagnostic Test Results:       Results from last 7 days   Lab Units 08/18/21  0531   WBC Thousand/uL 5 16   HEMOGLOBIN g/dL 13 1   HEMATOCRIT % 39 3   PLATELETS Thousands/uL 257   NEUTROS ABS Thousands/µL 3 24         Results from last 7 days   Lab Units 08/18/21  0531   SODIUM mmol/L 142   POTASSIUM mmol/L 3 1*   CHLORIDE mmol/L 110*   CO2 mmol/L 27   ANION GAP mmol/L 5   BUN mg/dL 8   CREATININE mg/dL 0 89   EGFR ml/min/1 73sq m 83   CALCIUM mg/dL 8 8     Results from last 7 days   Lab Units 08/18/21  0531   AST U/L 12   ALT U/L 18   ALK PHOS U/L 104   TOTAL PROTEIN g/dL 6 9   ALBUMIN g/dL 3 6   TOTAL BILIRUBIN mg/dL 1 16*         Results from last 7 days   Lab Units 08/18/21  0531   GLUCOSE RANDOM mg/dL 103       Results from last 7 days   Lab Units 08/16/21  1603   D-DIMER QUANTITATIVE ug/ml FEU 0 38       ED Treatment:   Medication Administration from 08/17/2021 2237 to 08/18/2021 0602       Date/Time Order Dose Route Action     08/17/2021 2329 diazepam (VALIUM) tablet 5 mg 5 mg Oral Given     08/18/2021 0121 ketorolac (TORADOL) injection 15 mg 15 mg Intravenous Given     08/18/2021 0230 QUEtiapine (SEROquel) tablet 50 mg 50 mg Oral Given     08/18/2021 0230 acetaminophen (TYLENOL) tablet 650 mg 650 mg Oral Given     08/18/2021 0437 morphine (MS CONTIN) ER tablet 15 mg 15 mg Oral Given        Past Medical History:   Diagnosis Date    Acid reflux     Anxiety     RESOLVED: [id number]    Arthritis     Bipolar 2 disorder (HCC)     FOLLOWS WITH PSYCHIATRIST  CONTINUE LAMOTRIGINE; RESOLVED: [id number]    Depression     Familial tremor     both hands    Fibromyalgia     LAST ASSESSED: [id number]    Hearing aid worn     left ear    [place] (hard of hearing)     left ear    Hypertension     Left-sided weakness     Lower back pain     Memory loss of unknown cause     long and short term    Migraine     Obesity     Obesity, Class II, BMI 35-39 9     Overactive bladder     Panic attack     Post traumatic stress disorder     Seasonal allergies     Stroke 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  CONT ASA 81 MG DAILY; RESOLVED: [id number]    Urinary incontinence     Wears dentures     partial lower / full upper    Wears glasses      Present on Admission:   Ambulatory dysfunction   Chronic bilateral low back pain with bilateral sciatica   Generalized anxiety disorder   Tardive dyskinesia   Mixed hyperlipidemia   Chronic pain syndrome   Anxiety   Esophageal reflux      Admitting Diagnosis:  Anxiety [F41 9]  Leg pain [M79 606]  Anxiety attack [F41 0]  Chronic leg pain [M79 606, G89 29]  Age/Sex: 60 y o  female     Admission Orders:  Scheduled Medications:  acetaminophen, 650 mg, Oral, Q8H [address]  amLODIPine, 5 mg, Oral, Daily  ascorbic acid, 500 mg, Oral, BID  aspirin, 81 mg, Oral, Daily  atorvastatin, 40 mg, Oral, Daily With Dinner  busPIRone, 15 mg, Oral, TID  Diclofenac Sodium, 2 g, Topical, 4x Daily  enoxaparin, 40 mg, Subcutaneous, Daily  ferrous sulfate, 325 mg, Oral, Daily With Breakfast  folic acid, 2,612 mcg, Oral, Daily  lidocaine, 1 patch, Topical, Daily  lithium carbonate, 300 mg, Oral, HS  pantoprazole, 20 mg, Oral, Early Morning  PARoxetine, 60 mg, Oral, Daily  potassium chloride, 40 mEq, Oral, BID  prazosin, 2 mg, Oral, Daily  propranolol, 20 mg, Oral, BID before breakfast/lunch  QUEtiapine, 50 mg, Oral, 4x Daily (PC & HS)  Valbenazine Tosylate, 80 mg, Oral, Daily      Continuous IV Infusions:     PRN Meds:  hydrOXYzine HCL, 50 mg, Oral, TID PRN  ketorolac, 15 mg, Intravenous, Q6H PRN  mirtazapine, 7 5 mg, Oral, HS PRN  ondansetron, 4 mg, Oral, Q6H PRN  senna-docusate sodium, 1 tablet, Oral, Daily PRN        IP CONSULT TO CASE MANAGEMENT    Network Utilization Review Department  ATTENTION: Please call with any questions or concerns to [phone] and carefully listen to the prompts so that you are directed to the right person  All voicemails are confidential   Cloyde Havers all requests for admission clinical reviews, approved or denied determinations and any other requests to dedicated fax number below belonging to the campus where the patient is receiving treatment   List of dedicated fax numbers for the Facilities:  [address] DENIALS (Administrative/Medical Necessity) [phone]   [address] (Maternity/NICU/Pediatrics) [phone]   [address] [place] [address] [address] [place] [address] [address] [facility] [facility] [phone]

## 2021-08-19 LAB
ANION GAP SERPL CALCULATED.3IONS-SCNC: 4 MMOL/L (ref 4–13)
BUN SERPL-MCNC: 9 MG/DL (ref 5–25)
CALCIUM SERPL-MCNC: 8.4 MG/DL (ref 8.3–10.1)
CHLORIDE SERPL-SCNC: 112 MMOL/L (ref 100–108)
CO2 SERPL-SCNC: 24 MMOL/L (ref 21–32)
CREAT SERPL-MCNC: 0.68 MG/DL (ref 0.6–1.3)
GFR SERPL CREATININE-BSD FRML MDRD: 112 ML/MIN/1.73SQ M
GLUCOSE SERPL-MCNC: 109 MG/DL (ref 65–140)
POTASSIUM SERPL-SCNC: 3.9 MMOL/L (ref 3.5–5.3)
SODIUM SERPL-SCNC: 140 MMOL/L (ref 136–145)

## 2021-08-19 PROCEDURE — 99222 1ST HOSP IP/OBS MODERATE 55: CPT | Performed by: PSYCHIATRY & NEUROLOGY

## 2021-08-19 PROCEDURE — 80048 BASIC METABOLIC PNL TOTAL CA: CPT | Performed by: STUDENT IN AN ORGANIZED HEALTH CARE EDUCATION/TRAINING PROGRAM

## 2021-08-19 PROCEDURE — 99226 PR SBSQ OBSERVATION CARE/DAY 35 MINUTES: CPT | Performed by: INTERNAL MEDICINE

## 2021-08-19 RX ORDER — OXYCODONE HYDROCHLORIDE 5 MG/1
5 TABLET ORAL EVERY 12 HOURS PRN
Status: DISCONTINUED | OUTPATIENT
Start: 2021-08-19 | End: 2021-08-25

## 2021-08-19 RX ORDER — PREGABALIN 100 MG/1
100 CAPSULE ORAL 2 TIMES DAILY
Status: DISCONTINUED | OUTPATIENT
Start: 2021-08-19 | End: 2021-08-23

## 2021-08-19 RX ORDER — LANOLIN ALCOHOL/MO/W.PET/CERES
3 CREAM (GRAM) TOPICAL
Status: DISCONTINUED | OUTPATIENT
Start: 2021-08-19 | End: 2021-08-19

## 2021-08-19 RX ADMIN — LITHIUM CARBONATE 300 MG: 300 TABLET, FILM COATED, EXTENDED RELEASE ORAL at 23:55

## 2021-08-19 RX ADMIN — PREGABALIN 100 MG: 100 CAPSULE ORAL at 08:28

## 2021-08-19 RX ADMIN — BUSPIRONE HYDROCHLORIDE 15 MG: 10 TABLET ORAL at 15:50

## 2021-08-19 RX ADMIN — PRAZOSIN HYDROCHLORIDE 2 MG: 2 CAPSULE ORAL at 12:45

## 2021-08-19 RX ADMIN — ACETAMINOPHEN 650 MG: 325 TABLET, FILM COATED ORAL at 13:06

## 2021-08-19 RX ADMIN — FERROUS SULFATE TAB 325 MG (65 MG ELEMENTAL FE) 325 MG: 325 (65 FE) TAB at 08:27

## 2021-08-19 RX ADMIN — DICLOFENAC SODIUM 2 G: 10 GEL TOPICAL at 18:30

## 2021-08-19 RX ADMIN — AMLODIPINE BESYLATE 5 MG: 5 TABLET ORAL at 08:27

## 2021-08-19 RX ADMIN — PROPRANOLOL HYDROCHLORIDE 20 MG: 20 TABLET ORAL at 12:43

## 2021-08-19 RX ADMIN — DICLOFENAC SODIUM 2 G: 10 GEL TOPICAL at 21:20

## 2021-08-19 RX ADMIN — KETOROLAC TROMETHAMINE 15 MG: 30 INJECTION, SOLUTION INTRAMUSCULAR; INTRAVENOUS at 21:56

## 2021-08-19 RX ADMIN — LORAZEPAM 0.5 MG: 0.5 TABLET ORAL at 21:56

## 2021-08-19 RX ADMIN — FOLIC ACID 1000 MCG: 1 TABLET ORAL at 08:27

## 2021-08-19 RX ADMIN — PROPRANOLOL HYDROCHLORIDE 20 MG: 20 TABLET ORAL at 06:22

## 2021-08-19 RX ADMIN — ACETAMINOPHEN 650 MG: 325 TABLET, FILM COATED ORAL at 05:06

## 2021-08-19 RX ADMIN — ATORVASTATIN CALCIUM 40 MG: 40 TABLET, FILM COATED ORAL at 15:50

## 2021-08-19 RX ADMIN — QUETIAPINE FUMARATE 50 MG: 25 TABLET ORAL at 18:29

## 2021-08-19 RX ADMIN — ACETAMINOPHEN 650 MG: 325 TABLET, FILM COATED ORAL at 21:19

## 2021-08-19 RX ADMIN — DICLOFENAC SODIUM 2 G: 10 GEL TOPICAL at 10:02

## 2021-08-19 RX ADMIN — KETOROLAC TROMETHAMINE 15 MG: 30 INJECTION, SOLUTION INTRAMUSCULAR; INTRAVENOUS at 15:49

## 2021-08-19 RX ADMIN — LIDOCAINE 1 PATCH: 50 PATCH TOPICAL at 08:32

## 2021-08-19 RX ADMIN — ENOXAPARIN SODIUM 40 MG: 40 INJECTION SUBCUTANEOUS at 08:29

## 2021-08-19 RX ADMIN — VALBENAZINE 80 MG: 80 CAPSULE ORAL at 08:30

## 2021-08-19 RX ADMIN — PAROXETINE HYDROCHLORIDE 60 MG: 20 TABLET, FILM COATED ORAL at 21:19

## 2021-08-19 RX ADMIN — PREGABALIN 100 MG: 100 CAPSULE ORAL at 18:30

## 2021-08-19 RX ADMIN — ASPIRIN 81 MG CHEWABLE TABLET 81 MG: 81 TABLET CHEWABLE at 08:28

## 2021-08-19 RX ADMIN — LORAZEPAM 0.5 MG: 0.5 TABLET ORAL at 10:02

## 2021-08-19 RX ADMIN — QUETIAPINE FUMARATE 50 MG: 25 TABLET ORAL at 08:27

## 2021-08-19 RX ADMIN — KETOROLAC TROMETHAMINE 15 MG: 30 INJECTION, SOLUTION INTRAMUSCULAR; INTRAVENOUS at 08:29

## 2021-08-19 RX ADMIN — PANTOPRAZOLE SODIUM 20 MG: 20 TABLET, DELAYED RELEASE ORAL at 05:06

## 2021-08-19 RX ADMIN — BUSPIRONE HYDROCHLORIDE 15 MG: 10 TABLET ORAL at 21:19

## 2021-08-19 RX ADMIN — QUETIAPINE FUMARATE 50 MG: 25 TABLET ORAL at 12:44

## 2021-08-19 RX ADMIN — OXYCODONE HYDROCHLORIDE AND ACETAMINOPHEN 500 MG: 500 TABLET ORAL at 08:27

## 2021-08-19 RX ADMIN — OXYCODONE HYDROCHLORIDE AND ACETAMINOPHEN 500 MG: 500 TABLET ORAL at 18:30

## 2021-08-19 RX ADMIN — QUETIAPINE FUMARATE 50 MG: 25 TABLET ORAL at 21:19

## 2021-08-19 RX ADMIN — OXYCODONE HYDROCHLORIDE 5 MG: 5 TABLET ORAL at 13:07

## 2021-08-19 RX ADMIN — BUSPIRONE HYDROCHLORIDE 15 MG: 10 TABLET ORAL at 08:27
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
## 2021-08-19 NOTE — UTILIZATION REVIEW
Continued Stay Review    Date: 8/19                        Current Patient Class: Observation   Current Level of Care: Med Surg    HPI:55 y o  female initially admitted on     Assessment/Plan:   Ambulatory dysfunction  Assessment & Plan  Patient with decreased physical mobility, at risk for falls  She uses walker and wheelchair at home  Presented to the ED requesting placement to SNF  · Encourage good body mechanics and assist with transfers  · Keep personal items and call bell close to the patient  · PT and OT- recommends post acute rehab  ·  and Psychiatry consulted for placement    Generalized anxiety disorder  Assessment & Plan  Patient is on multiple psychiatric medications and was recently evaluated by inpatient psych at [facility] with adjustments to her medications including buspirone 50 mg t i d , paroxetine 60 mg daily, quetiapine 50 mg 4 times daily, mirtazapine 7 5 mg q h s , lithium 300 mg q h s  And hydroxyzine 50 mg t i d  P r n  [place] Patient continues to have increased anxiety with daily morning panic attacks  Patient also reports that her anxiety is worsened by her lack of pain control      Plan:  · Psychiatry consulted- appreciate recommendations  · Confirmed psych medication regimen following discharge from [address] [place] [address] as noted above      Chronic pain syndrome  Assessment & Plan  Patient has been on chronic opioids for low back and leg pain  She was seen in the clinic and discussed opioid tapering  Most recent opioid prescription per PDMP was for morphine 15 mg for 10 days (20 tablets)  Per chart review, concern for polypharmacy and suspicion of drug-seeking  · Scheduled Tylenol  · Lidocaine patch/Voltaren gel  · Toradol as needed for severe pain   · Added Lyrica 100 mg b i d  8/19  · Added Oxy IR 5 mg q 12h p r n  moderate pain    Awaiting placement  Per CM notes; received fax from [facility], a determination letter from May, 2021   Pt has had an inpatient psych stay since that time which changes the PASRR  CM called  [employer] and left a voicemail, awaiting return call to discuss  Received call back from Fraser with [facility] who confirmed that the options process must be completed again, previous LOD is ineligible at this time  Psych consult faxed to [place]  Await determination later      Vital Signs:   08/19/21 12:44:19  --  64  --  148/96  113  99 %  --  --   08/19/21 1243  --  60  --  148/96  --  --  --  --   08/19/21 08:01:30  98 6 °F (37 °C)  57  18  151/98  116  96 %         Pertinent Labs/Diagnostic Results:       Results from last 7 days   Lab Units 08/18/21  0531   WBC Thousand/uL 5 16   HEMOGLOBIN g/dL 13 1   HEMATOCRIT % 39 3   PLATELETS Thousands/uL 257   NEUTROS ABS Thousands/µL 3 24         Results from last 7 days   Lab Units 08/19/21  0638 08/18/21  0531   SODIUM mmol/L 140 142   POTASSIUM mmol/L 3 9 3 1*   CHLORIDE mmol/L 112* 110*   CO2 mmol/L 24 27   ANION GAP mmol/L 4 5   BUN mg/dL 9 8   CREATININE mg/dL 0 68 0 89   EGFR ml/min/1 73sq m 112 83   CALCIUM mg/dL 8 4 8 8     Results from last 7 days   Lab Units 08/18/21  0531   AST U/L 12   ALT U/L 18   ALK PHOS U/L 104   TOTAL PROTEIN g/dL 6 9   ALBUMIN g/dL 3 6   TOTAL BILIRUBIN mg/dL 1 16*         Results from last 7 days   Lab Units 08/19/21  0638 08/18/21  0531   GLUCOSE RANDOM mg/dL 109 103       Results from last 7 days   Lab Units 08/16/21  1603   D-DIMER QUANTITATIVE ug/ml FEU 0 38       Medications:   Scheduled Medications:  acetaminophen, 650 mg, Oral, Q8H KESHIA  amLODIPine, 5 mg, Oral, Daily  ascorbic acid, 500 mg, Oral, BID  aspirin, 81 mg, Oral, Daily  atorvastatin, 40 mg, Oral, Daily With Dinner  busPIRone, 15 mg, Oral, TID  Diclofenac Sodium, 2 g, Topical, 4x Daily  enoxaparin, 40 mg, Subcutaneous, Daily  ferrous sulfate, 325 mg, Oral, Daily With Breakfast  folic acid, 4,596 mcg, Oral, Daily  lidocaine, 1 patch, Topical, Daily  lithium carbonate, 300 mg, Oral, HS  pantoprazole, 20 mg, Oral, Early Morning  PARoxetine, 60 mg, Oral, Daily  prazosin, 2 mg, Oral, Daily  pregabalin, 100 mg, Oral, BID  propranolol, 20 mg, Oral, BID before breakfast/lunch  QUEtiapine, 50 mg, Oral, 4x Daily (PC & HS)  Valbenazine Tosylate, 80 mg, Oral, Daily      Continuous IV Infusions:     PRN Meds:  hydrOXYzine HCL, 50 mg, Oral, TID PRN  ketorolac, 15 mg, Intravenous, Q6H PRN  LORazepam, 0 5 mg, Oral, Q8H PRN  mirtazapine, 7 5 mg, Oral, HS PRN  ondansetron, 4 mg, Oral, Q6H PRN  oxyCODONE, 5 mg, Oral, Q12H PRN  senna-docusate sodium, 1 tablet, Oral, Daily PRN      Discharge Plan: See above    Network Utilization Review Department  ATTENTION: Please call with any questions or concerns to [phone] and carefully listen to the prompts so that you are directed to the right person  All voicemails are confidential   [facility] all requests for admission clinical reviews, approved or denied determinations and any other requests to dedicated fax number below belonging to the campus where the patient is receiving treatment   List of dedicated fax numbers for the Facilities:  [address] DENIALS (Administrative/Medical Necessity) [phone]   [address] (Maternity/NICU/Pediatrics) [phone] [address] [place] [address] [address] [place] [address] Lorenza [address] [facility] [place] [address] [phone]

## 2021-08-19 NOTE — CASE MANAGEMENT
CM received call from [facility] Supervisor who informed that she believes pt's most recent letter of determination is good for 180 days as is stated on the [facility] letter  She encouraged CM to call Gely Garvin with [employer] ([phone]) to inquire  CM called and left a voicemail requesting a return call  CM to follow

## 2021-08-19 NOTE — PROGRESS NOTES
INTERNAL MEDICINE RESIDENCY PROGRESS NOTE     Name: Abigail Hsu   Age & Sex: 60 y o  female   MRN: [id number]  Unit/Bed#: -01   Encounter: [id number]  Team: SOD Team A    PATIENT INFORMATION     Name: Abigail Hsu   Age & Sex: 60 y o  female   MRN: [id number]  Hospital Stay Days: 0    ASSESSMENT/PLAN     Overall assessment/Disposition:  24-year-old female with a history bipolar 2 disorder, anxiety/panic disorder, PTSD, chronic pain syndrome, opioid dependence, hypertension, hyperlipidemia, prior CVA currently here for placement to SNF/STR  Patient is wheelchair-bound and her son can no longer care for her at home  She is on multiple psychiatric medications and was recently evaluated by inpatient psych at [facility] with adjustments to her medications  Patient continues to have increased anxiety with daily morning panic attacks  Patient has chronic pain in her bilateral lower extremities due to fibromyalgia and bilateral knee osteoarthritis with history of opioid dependence on morphine  Will start Oxy 5 mg q 12 H p r n  In addition to Lyrica 100 mg b i d  Case management and psychiatry consulted for placement options  * Ambulatory dysfunction  Assessment & Plan  Patient with decreased physical mobility, at risk for falls  She uses walker and wheelchair at home  Presented to the ED requesting placement to SNF  · Encourage good body mechanics and assist with transfers  · Keep personal items and call bell close to the patient  · PT and OT- recommends post acute rehab  ·  and Psychiatry consulted for placement    Generalized anxiety disorder  Assessment & Plan  Patient is on multiple psychiatric medications and was recently evaluated by inpatient psych at [facility] with adjustments to her medications including buspirone 50 mg t i d , paroxetine 60 mg daily, quetiapine 50 mg 4 times daily, mirtazapine 7 5 mg q h s , lithium 300 mg q h s  And hydroxyzine 50 mg t i d  P r n  Xenia Alexander Patient continues to have increased anxiety with daily morning panic attacks  Patient also reports that her anxiety is worsened by her lack of pain control  Plan:  · Psychiatry consulted- appreciate recommendations  · Confirmed psych medication regimen following discharge from [address] [place] [address] as noted above  Chronic pain syndrome  Assessment & Plan  Patient has been on chronic opioids for low back and leg pain  She was seen in the clinic and discussed opioid tapering  Most recent opioid prescription per PDMP was for morphine 15 mg for 10 days (20 tablets)  Per chart review, concern for polypharmacy and suspicion of drug-seeking  · Scheduled Tylenol  · Lidocaine patch/Voltaren gel  · Toradol as needed for severe pain   · Added Lyrica 100 mg b i d  8/19  · Added Oxy IR 5 mg q 12h p r n  moderate pain      History of CVA (cerebrovascular accident)  Assessment & Plan  Continue aspirin and statin  Tardive dyskinesia  Assessment & Plan  Continue Valbenazine 80 mg daily  Esophageal reflux  Assessment & Plan  Continue Protonix 20 mg daily  Chronic bilateral low back pain with bilateral sciatica  Assessment & Plan  See pain regimen above      SUBJECTIVE     Patient seen and examined  No acute events overnight  Patient reports continued morning anxiety with panic attacks  Patient reports that pain in her bilateral lower extremities worse on the left lateral leg, bilateral knees, right upper thigh  Patient reports that her pain is currently uncontrolled  Patient reports that the lorazepam helps with her pain  This pain is chronic for her and she attributes it to her fibromyalgia  Patient denies any fevers, chills, chest pain, shortness of breath, nausea, vomiting, diarrhea       OBJECTIVE     Vitals:    08/19/21 0620 08/19/21 0801 08/19/21 1243 08/19/21 1244   BP: 140/82 151/98 148/96 148/96   Pulse: 56 57 60 64   Resp:  18     Temp:  98 6 °F (37 °C)     SpO2: 99% 96%  99%   Weight: Temperature:   Temp (24hrs), Av 5 °F (36 9 °C), Min:98 3 °F (36 8 °C), Max:98 7 °F (37 1 °C)    Temperature: 98 6 °F (37 °C)  Intake & Output:  I/O        0701 -  0700  07 -  0700  07 -  0700    P  O   740 360    Total Intake(mL/kg)  740 (8 3) 360 (4 1)    Urine (mL/kg/hr)  750 (0 4)     Total Output  750     Net  -10 +360           Unmeasured Urine Occurrence  2 x         Weights:        Body mass index is 36 99 kg/m²  Weight (last 2 days)     Date/Time   Weight    21 2257   88 8 (195 77)              Physical Exam:   General:  Tardive dyskinesia- uncontrolled tongue movements  Awake, alert, and conversant  No acute distress  Eyes: Normal conjunctiva, anicteric  Round symmetric pupils  ENT: Hearing grossly intact  No nasal discharge  Neck: Neck is supple  No masses or thyromegaly  Respiratory:  Clear to auscultation bilaterally  Respirations are non-labored  No wheezing  Cardiovascular:  Regular rate and rhythm  No lower extremity edema  MSK:  Tenderness to palpation along the left lateral thigh, knee  Skin: Warm  No rashes or ulcers  Neuro: A&O x 3  Sensation and CN II-XII grossly normal    Psych: Cooperative  Appropriate mood and affect  Normal judgement  LABORATORY DATA     Labs: I have personally reviewed pertinent reports  Results from last 7 days   Lab Units 21  0531   WBC Thousand/uL 5 16   HEMOGLOBIN g/dL 13 1   HEMATOCRIT % 39 3   PLATELETS Thousands/uL 257   NEUTROS PCT % 62   MONOS PCT % 10      Results from last 7 days   Lab Units 21  0638 21  0531   POTASSIUM mmol/L 3 9 3 1*   CHLORIDE mmol/L 112* 110*   CO2 mmol/L 24 27   BUN mg/dL 9 8   CREATININE mg/dL 0 68 0 89   CALCIUM mg/dL 8 4 8 8   ALK PHOS U/L  --  104   ALT U/L  --  18   AST U/L  --  12                            IMAGING & DIAGNOSTIC TESTING     Radiology Results: I have personally reviewed pertinent reports  No results found    Other Diagnostic Testing: I have personally reviewed pertinent reports      ACTIVE MEDICATIONS     Current Facility-Administered Medications   Medication Dose Route Frequency    acetaminophen (TYLENOL) tablet 650 mg  650 mg Oral Q8H [address]    amLODIPine (NORVASC) tablet 5 mg  5 mg Oral Daily    ascorbic acid (VITAMIN C) tablet 500 mg  500 mg Oral BID    aspirin chewable tablet 81 mg  81 mg Oral Daily    atorvastatin (LIPITOR) tablet 40 mg  40 mg Oral Daily With Dinner    busPIRone (BUSPAR) tablet 15 mg  15 mg Oral TID    Diclofenac Sodium (VOLTAREN) 1 % topical gel 2 g  2 g Topical 4x Daily    enoxaparin (LOVENOX) subcutaneous injection 40 mg  40 mg Subcutaneous Daily    ferrous sulfate tablet 325 mg  325 mg Oral Daily With Breakfast    folic acid (FOLVITE) tablet 1,000 mcg  1,000 mcg Oral Daily    hydrOXYzine HCL (ATARAX) tablet 50 mg  50 mg Oral TID PRN    ketorolac (TORADOL) injection 15 mg  15 mg Intravenous Q6H PRN    lidocaine (LIDODERM) 5 % patch 1 patch  1 patch Topical Daily    lithium carbonate (LITHOBID) CR tablet 300 mg  300 mg Oral HS    LORazepam (ATIVAN) tablet 0 5 mg  0 5 mg Oral Q8H PRN    mirtazapine (REMERON) tablet 7 5 mg  7 5 mg Oral HS PRN    ondansetron (ZOFRAN-ODT) dispersible tablet 4 mg  4 mg Oral Q6H PRN    oxyCODONE (ROXICODONE) IR tablet 5 mg  5 mg Oral Q12H PRN    pantoprazole (PROTONIX) EC tablet 20 mg  20 mg Oral Early Morning    PARoxetine (PAXIL) tablet 60 mg  60 mg Oral Daily    prazosin (MINIPRESS) capsule 2 mg  2 mg Oral Daily    pregabalin (LYRICA) capsule 100 mg  100 mg Oral BID    propranolol (INDERAL) tablet 20 mg  20 mg Oral BID before breakfast/lunch    QUEtiapine (SEROquel) tablet 50 mg  50 mg Oral 4x Daily (PC & HS)    senna-docusate sodium (SENOKOT S) 8 6-50 mg per tablet 1 tablet  1 tablet Oral Daily PRN    Valbenazine Tosylate CAPS 80 mg  80 mg Oral Daily       VTE Pharmacologic Prophylaxis: Enoxaparin (Lovenox)  VTE Mechanical Prophylaxis: sequential compression device    Portions of the record may have been created with voice recognition software  Occasional wrong word or "sound a like" substitutions may have occurred due to the inherent limitations of voice recognition software  Read the chart carefully and recognize, using context, where substitutions have occurred    ==  Maycol Franks, [facility]  Internal Medicine Residency PGY-1

## 2021-08-19 NOTE — CASE MANAGEMENT
M received fax from [facility], a determination letter from May, 2021  Pt has had an inpatient psych stay since that time which changes the PASRR  CM called [facility] and left a voicemail, awaiting return call to discuss  CM to follow

## 2021-08-19 NOTE — CASE MANAGEMENT
CM received call back from Grandy with [facility] who confirmed that the options process must be completed again, previous LOD is ineligible at this time  CM faxed psych consult to the county, awaiting determination letter  Referrals expanded to a 20 mile radius  CM to follow

## 2021-08-20 PROBLEM — M54.42 CHRONIC BILATERAL LOW BACK PAIN WITH BILATERAL SCIATICA: Status: RESOLVED | Noted: 2017-03-22 | Resolved: 2021-08-20

## 2021-08-20 PROBLEM — G89.29 CHRONIC PAIN: Status: ACTIVE | Noted: 2021-08-20

## 2021-08-20 PROBLEM — G89.29 CHRONIC BILATERAL LOW BACK PAIN WITH BILATERAL SCIATICA: Status: RESOLVED | Noted: 2017-03-22 | Resolved: 2021-08-20

## 2021-08-20 PROBLEM — M54.41 CHRONIC BILATERAL LOW BACK PAIN WITH BILATERAL SCIATICA: Status: RESOLVED | Noted: 2017-03-22 | Resolved: 2021-08-20

## 2021-08-20 LAB
ANION GAP SERPL CALCULATED.3IONS-SCNC: 7 MMOL/L (ref 4–13)
BUN SERPL-MCNC: 13 MG/DL (ref 5–25)
CALCIUM SERPL-MCNC: 8.5 MG/DL (ref 8.3–10.1)
CHLORIDE SERPL-SCNC: 110 MMOL/L (ref 100–108)
CO2 SERPL-SCNC: 22 MMOL/L (ref 21–32)
CREAT SERPL-MCNC: 0.73 MG/DL (ref 0.6–1.3)
GFR SERPL CREATININE-BSD FRML MDRD: 106 ML/MIN/1.73SQ M
GLUCOSE SERPL-MCNC: 107 MG/DL (ref 65–140)
POTASSIUM SERPL-SCNC: 3.5 MMOL/L (ref 3.5–5.3)
SODIUM SERPL-SCNC: 139 MMOL/L (ref 136–145)

## 2021-08-20 PROCEDURE — 80048 BASIC METABOLIC PNL TOTAL CA: CPT | Performed by: STUDENT IN AN ORGANIZED HEALTH CARE EDUCATION/TRAINING PROGRAM

## 2021-08-20 PROCEDURE — 99233 SBSQ HOSP IP/OBS HIGH 50: CPT | Performed by: INTERNAL MEDICINE

## 2021-08-20 PROCEDURE — 97116 GAIT TRAINING THERAPY: CPT

## 2021-08-20 RX ADMIN — QUETIAPINE FUMARATE 50 MG: 25 TABLET ORAL at 21:56

## 2021-08-20 RX ADMIN — ACETAMINOPHEN 650 MG: 325 TABLET, FILM COATED ORAL at 13:27

## 2021-08-20 RX ADMIN — LITHIUM CARBONATE 300 MG: 300 TABLET, FILM COATED, EXTENDED RELEASE ORAL at 23:00

## 2021-08-20 RX ADMIN — LIDOCAINE 1 PATCH: 50 PATCH TOPICAL at 08:24

## 2021-08-20 RX ADMIN — OXYCODONE HYDROCHLORIDE AND ACETAMINOPHEN 500 MG: 500 TABLET ORAL at 08:24

## 2021-08-20 RX ADMIN — QUETIAPINE FUMARATE 50 MG: 25 TABLET ORAL at 17:30

## 2021-08-20 RX ADMIN — LORAZEPAM 0.5 MG: 0.5 TABLET ORAL at 06:26

## 2021-08-20 RX ADMIN — BUSPIRONE HYDROCHLORIDE 15 MG: 10 TABLET ORAL at 08:23

## 2021-08-20 RX ADMIN — PROPRANOLOL HYDROCHLORIDE 20 MG: 20 TABLET ORAL at 06:26

## 2021-08-20 RX ADMIN — QUETIAPINE FUMARATE 50 MG: 25 TABLET ORAL at 08:23

## 2021-08-20 RX ADMIN — BUSPIRONE HYDROCHLORIDE 15 MG: 10 TABLET ORAL at 21:56

## 2021-08-20 RX ADMIN — ATORVASTATIN CALCIUM 40 MG: 40 TABLET, FILM COATED ORAL at 17:30

## 2021-08-20 RX ADMIN — PREGABALIN 100 MG: 100 CAPSULE ORAL at 08:23

## 2021-08-20 RX ADMIN — AMLODIPINE BESYLATE 5 MG: 5 TABLET ORAL at 08:23

## 2021-08-20 RX ADMIN — MIRTAZAPINE 7.5 MG: 15 TABLET, FILM COATED ORAL at 22:07

## 2021-08-20 RX ADMIN — DICLOFENAC SODIUM 2 G: 10 GEL TOPICAL at 12:06

## 2021-08-20 RX ADMIN — DICLOFENAC SODIUM 2 G: 10 GEL TOPICAL at 21:57

## 2021-08-20 RX ADMIN — DICLOFENAC SODIUM 2 G: 10 GEL TOPICAL at 17:32

## 2021-08-20 RX ADMIN — ASPIRIN 81 MG CHEWABLE TABLET 81 MG: 81 TABLET CHEWABLE at 08:24

## 2021-08-20 RX ADMIN — PANTOPRAZOLE SODIUM 20 MG: 20 TABLET, DELAYED RELEASE ORAL at 05:11

## 2021-08-20 RX ADMIN — BUSPIRONE HYDROCHLORIDE 15 MG: 10 TABLET ORAL at 17:30

## 2021-08-20 RX ADMIN — OXYCODONE HYDROCHLORIDE 5 MG: 5 TABLET ORAL at 22:02

## 2021-08-20 RX ADMIN — DICLOFENAC SODIUM 2 G: 10 GEL TOPICAL at 08:24

## 2021-08-20 RX ADMIN — PAROXETINE HYDROCHLORIDE 60 MG: 20 TABLET, FILM COATED ORAL at 21:57

## 2021-08-20 RX ADMIN — PREGABALIN 100 MG: 100 CAPSULE ORAL at 17:30

## 2021-08-20 RX ADMIN — OXYCODONE HYDROCHLORIDE 5 MG: 5 TABLET ORAL at 05:11

## 2021-08-20 RX ADMIN — ACETAMINOPHEN 650 MG: 325 TABLET, FILM COATED ORAL at 05:11

## 2021-08-20 RX ADMIN — PRAZOSIN HYDROCHLORIDE 2 MG: 2 CAPSULE ORAL at 13:23

## 2021-08-20 RX ADMIN — ENOXAPARIN SODIUM 40 MG: 40 INJECTION SUBCUTANEOUS at 08:23

## 2021-08-20 RX ADMIN — FERROUS SULFATE TAB 325 MG (65 MG ELEMENTAL FE) 325 MG: 325 (65 FE) TAB at 08:23

## 2021-08-20 RX ADMIN — QUETIAPINE FUMARATE 50 MG: 25 TABLET ORAL at 12:05

## 2021-08-20 RX ADMIN — ACETAMINOPHEN 650 MG: 325 TABLET, FILM COATED ORAL at 21:56

## 2021-08-20 RX ADMIN — VALBENAZINE 80 MG: 80 CAPSULE ORAL at 12:05

## 2021-08-20 RX ADMIN — OXYCODONE HYDROCHLORIDE AND ACETAMINOPHEN 500 MG: 500 TABLET ORAL at 17:30

## 2021-08-20 RX ADMIN — FOLIC ACID 1000 MCG: 1 TABLET ORAL at 08:23

## 2021-08-20 RX ADMIN — PROPRANOLOL HYDROCHLORIDE 20 MG: 20 TABLET ORAL at 12:06

## 2021-08-20 NOTE — CASE MANAGEMENT
CM spoke with representative Judy Watts from [facility] ([phone]) who informed that county LOD is good for 180 days and pt's most recent inpatient psych stay does not require a new determination letter  Previous LOD uploaded to [place] and sent to [address], [address] [facility], and [place] [place] who are following pt  CM to follow

## 2021-08-20 NOTE — PLAN OF CARE
Problem: PHYSICAL THERAPY ADULT  Goal: Performs mobility at highest level of function for planned discharge setting  See evaluation for individualized goals  Description: Treatment/Interventions: Functional transfer training, LE strengthening/ROM, Elevations, Therapeutic exercise, Endurance training, Patient/family training, Equipment eval/education, Bed mobility, Gait training, Spoke to nursing, OT  Equipment Recommended: Flavia Cruz       See flowsheet documentation for full assessment, interventions and recommendations  Outcome: Progressing  Note: Prognosis: Fair  Problem List: Decreased strength, Decreased range of motion, Decreased endurance, Impaired balance, Decreased mobility, Pain  Assessment: Pt supine in bed upon arrival, pleasant and agreeable to OOB ambaultion and TE, S only for all bed mobility, Min A x1 for sit to stand transfers as well as ambualtion  Limited by LE pain  All Te performed seated in chair with good tolerance  Cont PT to maximize safe fucntional mobility  Barriers to Discharge: Inaccessible home environment, Decreased caregiver support        PT Discharge Recommendation: Post acute rehabilitation services     PT - OK to Discharge: Yes (to rehab when medically ready)    See flowsheet documentation for full assessment

## 2021-08-20 NOTE — CASE MANAGEMENT
Per [facility], [address] [facility] unable to accept  [facility] looking for bed availability, [facility] will check next week for bed availability  Referrals expanded to a 40 mile radius  CM department to follow for accepting facility

## 2021-08-20 NOTE — UTILIZATION REVIEW
Continued Stay Review    Date: 8/20                 Current Patient Class: Observation  Current Level of Care: Med Surg    HPI:55 y o  female initially admitted on 8/18    Assessment/Plan:   Await IP rehab placement    Per  notes; [facility] unable to accept  [facility] looking for bed availability, [facility] will check next week for bed availability  Referrals expanded to a 40 mile radius       Vital Signs:   08/20/21 15:38:37  98 3 °F (36 8 °C)  62  16  104/61  75  92 %  --  --   08/20/21 12:05:21  --  64  --  129/80  96  98 %  --  --   08/20/21 0756  --  --  --  --  --  99 %  None (Room air)       Pertinent Labs/Diagnostic Results:       Results from last 7 days   Lab Units 08/18/21  0531   WBC Thousand/uL 5 16   HEMOGLOBIN g/dL 13 1   HEMATOCRIT % 39 3   PLATELETS Thousands/uL 257   NEUTROS ABS Thousands/µL 3 24         Results from last 7 days   Lab Units 08/20/21  0559 08/19/21  0638 08/18/21  0531   SODIUM mmol/L 139 140 142   POTASSIUM mmol/L 3 5 3 9 3 1*   CHLORIDE mmol/L 110* 112* 110*   CO2 mmol/L 22 24 27   ANION GAP mmol/L 7 4 5   BUN mg/dL 13 9 8   CREATININE mg/dL 0 73 0 68 0 89   EGFR ml/min/1 73sq m 106 112 83   CALCIUM mg/dL 8 5 8 4 8 8     Results from last 7 days   Lab Units 08/18/21  0531   AST U/L 12   ALT U/L 18   ALK PHOS U/L 104   TOTAL PROTEIN g/dL 6 9   ALBUMIN g/dL 3 6   TOTAL BILIRUBIN mg/dL 1 16*         Results from last 7 days   Lab Units 08/20/21  0559 08/19/21  0638 08/18/21  0531   GLUCOSE RANDOM mg/dL 107 109 103       Results from last 7 days   Lab Units 08/16/21  1603   D-DIMER QUANTITATIVE ug/ml FEU 0 38       Medications:   Scheduled Medications:  acetaminophen, 650 mg, Oral, Q8H KESIHA  amLODIPine, 5 mg, Oral, Daily  ascorbic acid, 500 mg, Oral, BID  aspirin, 81 mg, Oral, Daily  atorvastatin, 40 mg, Oral, Daily With Dinner  busPIRone, 15 mg, Oral, TID  Diclofenac Sodium, 2 g, Topical, 4x Daily  enoxaparin, 40 mg, Subcutaneous, Daily  ferrous sulfate, 325 mg, Oral, Daily With Breakfast  folic acid, 8,380 mcg, Oral, Daily  lidocaine, 1 patch, Topical, Daily  lithium carbonate, 300 mg, Oral, HS  pantoprazole, 20 mg, Oral, Early Morning  PARoxetine, 60 mg, Oral, Daily  prazosin, 2 mg, Oral, Daily  pregabalin, 100 mg, Oral, BID  propranolol, 20 mg, Oral, BID before breakfast/lunch  QUEtiapine, 50 mg, Oral, 4x Daily (PC & HS)  Valbenazine Tosylate, 80 mg, Oral, Daily      Continuous IV Infusions: None     PRN Meds:  hydrOXYzine HCL, 50 mg, Oral, TID PRN  LORazepam, 0 5 mg, Oral, Q8H PRN  mirtazapine, 7 5 mg, Oral, HS PRN  ondansetron, 4 mg, Oral, Q6H PRN  oxyCODONE, 5 mg, Oral, Q12H PRN  senna-docusate sodium, 1 tablet, Oral, Daily PRN        Discharge Plan: Awaiting IP placement - see above    Network Utilization Review Department  ATTENTION: Please call with any questions or concerns to [phone] and carefully listen to the prompts so that you are directed to the right person  All voicemails are confidential   Janette Villavicencio all requests for admission clinical reviews, approved or denied determinations and any other requests to dedicated fax number below belonging to the campus where the patient is receiving treatment   List of dedicated fax numbers for the Facilities:  [address] DENIALS (Administrative/Medical Necessity) [phone]   [address] (Maternity/NICU/Pediatrics) [phone] [address] [phone]   [address] [place] [address] [id number] [place] [address] Lorenza Baker [address] [facility] [place] [phone]

## 2021-08-20 NOTE — PLAN OF CARE
Problem: Potential for Falls  Goal: Patient will remain free of falls  Description: INTERVENTIONS:  - Educate patient/family on patient safety including physical limitations  - Instruct patient to call for assistance with activity   - Consult OT/PT to assist with strengthening/mobility   - Keep Call bell within reach  - Keep bed low and locked with side rails adjusted as appropriate  - Keep care items and personal belongings within reach  - Initiate and maintain comfort rounds  - Make Fall Risk Sign visible to staff  - Offer Toileting every  Hours, in advance of need  - Initiate/Maintain alarm  - Obtain necessary fall risk management equipment:   - Apply yellow socks and bracelet for high fall risk patients  - Consider moving patient to room near nurses station  Outcome: Progressing     Problem: MOBILITY - ADULT  Goal: Maintain or return to baseline ADL function  Description: INTERVENTIONS:  -  Assess patient's ability to carry out ADLs; assess patient's baseline for ADL function and identify physical deficits which impact ability to perform ADLs (bathing, care of mouth/teeth, toileting, grooming, dressing, etc )  - Assess/evaluate cause of self-care deficits   - Assess range of motion  - Assess patient's mobility; develop plan if impaired  - Assess patient's need for assistive devices and provide as appropriate  - Encourage maximum independence but intervene and supervise when necessary  - Involve family in performance of ADLs  - Assess for home care needs following discharge   - Consider OT consult to assist with ADL evaluation and planning for discharge  - Provide patient education as appropriate  Outcome: Progressing  Goal: Maintains/Returns to pre admission functional level  Description: INTERVENTIONS:  - Perform BMAT or MOVE assessment daily    - Set and communicate daily mobility goal to care team and patient/family/caregiver     - Collaborate with rehabilitation services on mobility goals if consulted  - Perform Range of Motion times a day  - Reposition patient every  hours    - Dangle patient  times a day  - Stand patient  times a day  - Ambulate patient times a day  - Out of bed to chair  times a day   - Out of bed for meals times a day  - Out of bed for toileting  - Record patient progress and toleration of activity level   Outcome: Progressing

## 2021-08-20 NOTE — PROGRESS NOTES
INTERNAL MEDICINE RESIDENCY PROGRESS NOTE     Name: Mian Johnson   Age & Sex: 60 y o  female   MRN: [id number]  Unit/Bed#: -01   Encounter: [id number]  Team: SOD Team A    PATIENT INFORMATION     Name: Mian Johnson   Age & Sex: 60 y o  female   MRN: [id number]  Hospital Stay Days: 0    ASSESSMENT/PLAN     Overall Assessment/Disposition: 53-year-old female with a history bipolar 2 disorder, anxiety/panic disorder, PTSD, chronic pain syndrome, opioid dependence, hypertension, hyperlipidemia, prior CVA currently here for placement to SNF/STR   Patient is wheelchair-bound and her son can no longer care for her at home  Michelet Isaac is on multiple psychiatric medications and was recently evaluated by inpatient psych at [facility] with adjustments to her medications  Patient continues to have increased anxiety with daily morning panic attacks  Patient has chronic pain in her bilateral lower extremities due to fibromyalgia and bilateral knee osteoarthritis with history of opioid dependence on morphine  Oxy 5 mg q 12 H p r n  in addition to Lyrica 100 mg b i d started on 08/19  Psychiatry and Case Management consulted-pending placement  Ambulatory dysfunction  Assessment & Plan  Patient with decreased physical mobility, at risk for falls  She uses walker and wheelchair at home  Presented to the ED requesting placement to SNF  · Encourage good body mechanics and assist with transfers  · Keep personal items and call bell close to the patient  · PT and OT- recommends post acute rehab  ·  and Psychiatry consulted for placement    Generalized anxiety disorder  Assessment & Plan  Patient is on multiple psychiatric medications and was recently evaluated by inpatient psych at [facility] with adjustments to her medications including buspirone 50 mg t i d , paroxetine 60 mg daily, quetiapine 50 mg 4 times daily, mirtazapine 7 5 mg q h s , lithium 300 mg q h s  And hydroxyzine 50 mg t i d  P r n  Morris Mckenzie Patient continues to have increased anxiety with daily morning panic attacks  Patient also reports that her anxiety is worsened by her lack of pain control  Plan:  · Psychiatry consulted- appreciate recommendations  · Confirmed psych medication regimen following discharge from [address] [place] [address] as noted above  Chronic pain syndrome  Assessment & Plan  Patient has been on chronic opioids for low back and leg pain  She was seen in the clinic and discussed opioid tapering  Most recent opioid prescription per PDMP was for morphine 15 mg for 10 days (20 tablets)  Per chart review, concern for polypharmacy and suspicion of drug-seeking  · Scheduled Tylenol  · Lidocaine patch/Voltaren gel  · Added Lyrica 100 mg b i d  8/19  · Added Oxy IR 5 mg q 12h p r n  moderate pain 8/19      History of CVA (cerebrovascular accident)  Assessment & Plan  Continue aspirin and statin  Tardive dyskinesia  Assessment & Plan  -Continue Valbenazine 80 mg daily  -appears to have slightly improved with addition of Lyrica    Esophageal reflux  Assessment & Plan  Continue Protonix 20 mg daily  * Chronic pain  Assessment & Plan  See current pain regimen above    Chronic bilateral low back pain with bilateral sciatica-resolved as of 8/20/2021  Assessment & Plan  See pain regimen above      SUBJECTIVE     Patient seen and examined  No acute events overnight  Patient reports that her anxiety has been better controlled her past days  Patient reports that the addition to pain medication today had her pain  Patient is still complaining pain in bilateral knees and lateral side of left lower extremity  Patient denies any fevers, chills, chest pain, shortness of breath, abdominal pain, nausea, vomiting, diarrhea constipation      OBJECTIVE     Vitals:    08/19/21 1546 08/19/21 2347 08/20/21 0732 08/20/21 0756   BP: 138/89 134/86 131/81    BP Location:  Right arm     Pulse: 60 66 59    Resp: 16 16 20    Temp: 98 6 °F (37 °C) 98 2 °F (36 8 °C) 97 6 °F (36 4 °C)    TempSrc:  Oral     SpO2: 99% 96% 99% 99%   Weight:          Temperature:   Temp (24hrs), Av 1 °F (36 7 °C), Min:97 6 °F (36 4 °C), Max:98 6 °F (37 °C)    Temperature: 97 6 °F (36 4 °C)  Intake & Output:  I/O        07 -  0700 701 -  07 -  0700    P  O  740 360     Total Intake(mL/kg) 740 (8 3) 360 (4 1)     Urine (mL/kg/hr) 750 (0 4) 250 (0 1) 400 (1)    Total Output 750 250 400    Net -10 +110 -400           Unmeasured Urine Occurrence 2 x          Weights:        Body mass index is 36 99 kg/m²  Weight (last 2 days)     None          Physical Exam:   General: Awake, alert, and conversant  No acute distress  Eyes: Normal conjunctiva, anicteric  Round symmetric pupils  ENT: Hearing grossly intact  No nasal discharge  Neck: Neck is supple  No masses or thyromegaly  Respiratory:  Clear to auscultation bilaterally  Respirations are non-labored  No wheezing  Cardiovascular:  Regular rate and rhythm  No lower extremity edema  Skin: Warm  No rashes or ulcers  Neuro: A&O x 3  Sensation and CN II-XII grossly normal    Psych: Cooperative  Appropriate mood and affect  Normal judgement  LABORATORY DATA     Labs: I have personally reviewed pertinent reports  Results from last 7 days   Lab Units 21  0531   WBC Thousand/uL 5 16   HEMOGLOBIN g/dL 13 1   HEMATOCRIT % 39 3   PLATELETS Thousands/uL 257   NEUTROS PCT % 62   MONOS PCT % 10      Results from last 7 days   Lab Units 21  0559 21  0638 21  0531   POTASSIUM mmol/L 3 5 3 9 3 1*   CHLORIDE mmol/L 110* 112* 110*   CO2 mmol/L 22 24 27   BUN mg/dL 13 9 8   CREATININE mg/dL 0 73 0 68 0 89   CALCIUM mg/dL 8 5 8 4 8 8   ALK PHOS U/L  --   --  104   ALT U/L  --   --  18   AST U/L  --   --  12                            IMAGING & DIAGNOSTIC TESTING     Radiology Results: I have personally reviewed pertinent reports  No results found    Other Diagnostic Testing: I have personally reviewed pertinent reports      ACTIVE MEDICATIONS     Current Facility-Administered Medications   Medication Dose Route Frequency    acetaminophen (TYLENOL) tablet 650 mg  650 mg Oral Q8H [address]    amLODIPine (NORVASC) tablet 5 mg  5 mg Oral Daily    ascorbic acid (VITAMIN C) tablet 500 mg  500 mg Oral BID    aspirin chewable tablet 81 mg  81 mg Oral Daily    atorvastatin (LIPITOR) tablet 40 mg  40 mg Oral Daily With Dinner    busPIRone (BUSPAR) tablet 15 mg  15 mg Oral TID    Diclofenac Sodium (VOLTAREN) 1 % topical gel 2 g  2 g Topical 4x Daily    enoxaparin (LOVENOX) subcutaneous injection 40 mg  40 mg Subcutaneous Daily    ferrous sulfate tablet 325 mg  325 mg Oral Daily With Breakfast    folic acid (FOLVITE) tablet 1,000 mcg  1,000 mcg Oral Daily    hydrOXYzine HCL (ATARAX) tablet 50 mg  50 mg Oral TID PRN    lidocaine (LIDODERM) 5 % patch 1 patch  1 patch Topical Daily    lithium carbonate (LITHOBID) CR tablet 300 mg  300 mg Oral HS    LORazepam (ATIVAN) tablet 0 5 mg  0 5 mg Oral Q8H PRN    mirtazapine (REMERON) tablet 7 5 mg  7 5 mg Oral HS PRN    ondansetron (ZOFRAN-ODT) dispersible tablet 4 mg  4 mg Oral Q6H PRN    oxyCODONE (ROXICODONE) IR tablet 5 mg  5 mg Oral Q12H PRN    pantoprazole (PROTONIX) EC tablet 20 mg  20 mg Oral Early Morning    PARoxetine (PAXIL) tablet 60 mg  60 mg Oral Daily    prazosin (MINIPRESS) capsule 2 mg  2 mg Oral Daily    pregabalin (LYRICA) capsule 100 mg  100 mg Oral BID    propranolol (INDERAL) tablet 20 mg  20 mg Oral BID before breakfast/lunch    QUEtiapine (SEROquel) tablet 50 mg  50 mg Oral 4x Daily (PC & HS)    senna-docusate sodium (SENOKOT S) 8 6-50 mg per tablet 1 tablet  1 tablet Oral Daily PRN    Valbenazine Tosylate CAPS 80 mg  80 mg Oral Daily       VTE Pharmacologic Prophylaxis: Enoxaparin (Lovenox)  VTE Mechanical Prophylaxis: sequential compression device    Portions of the record may have been created with voice recognition software  Occasional wrong word or "sound a like" substitutions may have occurred due to the inherent limitations of voice recognition software  Read the chart carefully and recognize, using context, where substitutions have occurred    ==  Everardo Nicolas, [address]  Internal Medicine Residency PGY-1

## 2021-08-20 NOTE — PHYSICAL THERAPY NOTE
Physical Therapy Progress Note     08/20/21 1320   PT Last Visit   PT Visit Date 08/20/21   Note Type   Note Type Treatment   Pain Assessment   Pain Assessment Tool 0-10   Pain Score 8   Pain Location/Orientation Orientation: Left;Orientation: Right;Location: Leg   Hospital Pain Intervention(s) Medication (See MAR); Ambulation/increased activity;Repositioned   Restrictions/Precautions   Weight Bearing Precautions Per Order No   Other Precautions Pain; Fall Risk   General   Chart Reviewed Yes   Response to Previous Treatment Patient with no complaints from previous session  Family/Caregiver Present No   Cognition   Overall Cognitive Status WFL   Arousal/Participation Alert; Responsive; Cooperative   Attention Within functional limits   Orientation Level Oriented X4   Memory Within functional limits   Following Commands Follows multistep commands without difficulty   Bed Mobility   Supine to Sit 5  Supervision   Additional items Increased time required;Verbal cues   Sit to Supine 3  Moderate assistance   Additional items Increased time required;Verbal cues   Transfers   Sit to Stand 4  Minimal assistance   Additional items Assist x 1; Increased time required;Verbal cues   Stand to Sit 4  Minimal assistance   Additional items Assist x 1; Armrests; Increased time required   Ambulation/Elevation   Gait pattern Improper Weight shift;Decreased foot clearance; Forward Flexion; Shuffling   Gait Assistance 4  Minimal assist   Additional items Assist x 1;Verbal cues   Assistive Device Rolling walker   Distance 15'x2   Exercises   Hamstring Stretch Sitting;10 reps;Bilateral   Hip Adduction Sitting;20 reps;Bilateral   Knee AROM Long Arc Quad Sitting;20 reps;Bilateral   Ankle Pumps Sitting;20 reps;Bilateral   Assessment   Prognosis Fair   Problem List Decreased strength;Decreased range of motion;Decreased endurance; Impaired balance;Decreased mobility;Pain   Assessment Pt supine in bed upon arrival, pleasant and agreeable to OOB ambaultion and TE, S only for all bed mobility, Min A x1 for sit to stand transfers as well as ambualtion  Limited by LE pain  All Te performed seated in chair with good tolerance  Cont PT to maximize safe fucntional mobility  Barriers to Discharge Inaccessible home environment;Decreased caregiver support   Goals   Patient Goals none stated   STG Expiration Date 08/28/21   Short Term Goal #1 In 10 days pt will complete: 1) Bed mobility skills with mod I to increase safety and independence as well as decrease caregiver burden  2) Functional transfers with mod I to promote increased independence, safety, and QOL  3) Ambulate 150' using least restrictive AD with mod I without LOB and stable vitals so that pt can negotiate previous living environment safely and promote independence with functional mobility and return to PLOF  4) Stair training up/ down 2 step/s using rail/s with mod I so that pt can enter/negotiate previous living environment safely and decrease fall risk  5) Improve balance grades by 1/2 grade to increase safety with all mobility and decrease fall risk  6) Improve BLE strength by 1/2 grade to help increase overall functional mobility and decrease fall risk  Plan   Treatment/Interventions Functional transfer training;LE strengthening/ROM; Therapeutic exercise; Endurance training;Bed mobility;Gait training   Progress Progressing toward goals   PT Frequency   (3-5x/week)   Recommendation   PT Discharge Recommendation Post acute rehabilitation services   Equipment Recommended [address] [place] Recommended Wheeled walker   PT - OK to Discharge Yes  (to rehab when medically ready)       Berna Benson, PTA

## 2021-08-21 LAB
ANION GAP SERPL CALCULATED.3IONS-SCNC: 5 MMOL/L (ref 4–13)
BASOPHILS # BLD AUTO: 0.04 THOUSANDS/ΜL (ref 0–0.1)
BASOPHILS NFR BLD AUTO: 1 % (ref 0–1)
BUN SERPL-MCNC: 17 MG/DL (ref 5–25)
CALCIUM SERPL-MCNC: 8.4 MG/DL (ref 8.3–10.1)
CHLORIDE SERPL-SCNC: 110 MMOL/L (ref 100–108)
CO2 SERPL-SCNC: 28 MMOL/L (ref 21–32)
CREAT SERPL-MCNC: 0.82 MG/DL (ref 0.6–1.3)
EOSINOPHIL # BLD AUTO: 0.19 THOUSAND/ΜL (ref 0–0.61)
EOSINOPHIL NFR BLD AUTO: 5 % (ref 0–6)
ERYTHROCYTE [DISTWIDTH] IN BLOOD BY AUTOMATED COUNT: 13.9 % (ref 11.6–15.1)
GFR SERPL CREATININE-BSD FRML MDRD: 92 ML/MIN/1.73SQ M
GLUCOSE SERPL-MCNC: 88 MG/DL (ref 65–140)
HCT VFR BLD AUTO: 41.1 % (ref 34.8–46.1)
HGB BLD-MCNC: 13.3 G/DL (ref 11.5–15.4)
IMM GRANULOCYTES # BLD AUTO: 0.01 THOUSAND/UL (ref 0–0.2)
IMM GRANULOCYTES NFR BLD AUTO: 0 % (ref 0–2)
LYMPHOCYTES # BLD AUTO: 1.37 THOUSANDS/ΜL (ref 0.6–4.47)
LYMPHOCYTES NFR BLD AUTO: 37 % (ref 14–44)
MCH RBC QN AUTO: 30 PG (ref 26.8–34.3)
MCHC RBC AUTO-ENTMCNC: 32.4 G/DL (ref 31.4–37.4)
MCV RBC AUTO: 93 FL (ref 82–98)
MONOCYTES # BLD AUTO: 0.4 THOUSAND/ΜL (ref 0.17–1.22)
MONOCYTES NFR BLD AUTO: 11 % (ref 4–12)
NEUTROPHILS # BLD AUTO: 1.72 THOUSANDS/ΜL (ref 1.85–7.62)
NEUTS SEG NFR BLD AUTO: 46 % (ref 43–75)
NRBC BLD AUTO-RTO: 0 /100 WBCS
PLATELET # BLD AUTO: 264 THOUSANDS/UL (ref 149–390)
PMV BLD AUTO: 9.6 FL (ref 8.9–12.7)
POTASSIUM SERPL-SCNC: 3.7 MMOL/L (ref 3.5–5.3)
RBC # BLD AUTO: 4.44 MILLION/UL (ref 3.81–5.12)
SODIUM SERPL-SCNC: 143 MMOL/L (ref 136–145)
WBC # BLD AUTO: 3.73 THOUSAND/UL (ref 4.31–10.16)

## 2021-08-21 PROCEDURE — 99233 SBSQ HOSP IP/OBS HIGH 50: CPT | Performed by: INTERNAL MEDICINE

## 2021-08-21 PROCEDURE — 85025 COMPLETE CBC W/AUTO DIFF WBC: CPT | Performed by: STUDENT IN AN ORGANIZED HEALTH CARE EDUCATION/TRAINING PROGRAM

## 2021-08-21 PROCEDURE — 80048 BASIC METABOLIC PNL TOTAL CA: CPT | Performed by: STUDENT IN AN ORGANIZED HEALTH CARE EDUCATION/TRAINING PROGRAM

## 2021-08-21 RX ADMIN — PAROXETINE HYDROCHLORIDE 60 MG: 20 TABLET, FILM COATED ORAL at 23:18

## 2021-08-21 RX ADMIN — ENOXAPARIN SODIUM 40 MG: 40 INJECTION SUBCUTANEOUS at 09:55

## 2021-08-21 RX ADMIN — OXYCODONE HYDROCHLORIDE AND ACETAMINOPHEN 500 MG: 500 TABLET ORAL at 17:38

## 2021-08-21 RX ADMIN — AMLODIPINE BESYLATE 5 MG: 5 TABLET ORAL at 09:57

## 2021-08-21 RX ADMIN — VALBENAZINE 80 MG: 80 CAPSULE ORAL at 09:55

## 2021-08-21 RX ADMIN — BUSPIRONE HYDROCHLORIDE 15 MG: 10 TABLET ORAL at 15:27

## 2021-08-21 RX ADMIN — OXYCODONE HYDROCHLORIDE AND ACETAMINOPHEN 500 MG: 500 TABLET ORAL at 09:55

## 2021-08-21 RX ADMIN — FOLIC ACID 1000 MCG: 1 TABLET ORAL at 09:58

## 2021-08-21 RX ADMIN — QUETIAPINE FUMARATE 50 MG: 25 TABLET ORAL at 23:19

## 2021-08-21 RX ADMIN — OXYCODONE HYDROCHLORIDE 5 MG: 5 TABLET ORAL at 05:51

## 2021-08-21 RX ADMIN — OXYCODONE HYDROCHLORIDE 5 MG: 5 TABLET ORAL at 17:50

## 2021-08-21 RX ADMIN — PREGABALIN 100 MG: 100 CAPSULE ORAL at 17:38

## 2021-08-21 RX ADMIN — DICLOFENAC SODIUM 2 G: 10 GEL TOPICAL at 17:38

## 2021-08-21 RX ADMIN — ACETAMINOPHEN 650 MG: 325 TABLET, FILM COATED ORAL at 23:19

## 2021-08-21 RX ADMIN — PANTOPRAZOLE SODIUM 20 MG: 20 TABLET, DELAYED RELEASE ORAL at 05:45

## 2021-08-21 RX ADMIN — LIDOCAINE 1 PATCH: 50 PATCH TOPICAL at 09:59

## 2021-08-21 RX ADMIN — DICLOFENAC SODIUM 2 G: 10 GEL TOPICAL at 09:58

## 2021-08-21 RX ADMIN — PREGABALIN 100 MG: 100 CAPSULE ORAL at 09:57

## 2021-08-21 RX ADMIN — PRAZOSIN HYDROCHLORIDE 2 MG: 2 CAPSULE ORAL at 09:58

## 2021-08-21 RX ADMIN — MIRTAZAPINE 7.5 MG: 15 TABLET, FILM COATED ORAL at 23:26

## 2021-08-21 RX ADMIN — ACETAMINOPHEN 650 MG: 325 TABLET, FILM COATED ORAL at 05:45

## 2021-08-21 RX ADMIN — FERROUS SULFATE TAB 325 MG (65 MG ELEMENTAL FE) 325 MG: 325 (65 FE) TAB at 09:57

## 2021-08-21 RX ADMIN — BUSPIRONE HYDROCHLORIDE 15 MG: 10 TABLET ORAL at 09:57

## 2021-08-21 RX ADMIN — DICLOFENAC SODIUM 2 G: 10 GEL TOPICAL at 13:02

## 2021-08-21 RX ADMIN — ASPIRIN 81 MG CHEWABLE TABLET 81 MG: 81 TABLET CHEWABLE at 09:55

## 2021-08-21 RX ADMIN — PROPRANOLOL HYDROCHLORIDE 20 MG: 20 TABLET ORAL at 09:55

## 2021-08-21 RX ADMIN — ATORVASTATIN CALCIUM 40 MG: 40 TABLET, FILM COATED ORAL at 15:27

## 2021-08-21 RX ADMIN — QUETIAPINE FUMARATE 50 MG: 25 TABLET ORAL at 09:57

## 2021-08-21 RX ADMIN — ACETAMINOPHEN 650 MG: 325 TABLET, FILM COATED ORAL at 13:02

## 2021-08-21 RX ADMIN — QUETIAPINE FUMARATE 50 MG: 25 TABLET ORAL at 13:02

## 2021-08-21 RX ADMIN — BUSPIRONE HYDROCHLORIDE 15 MG: 10 TABLET ORAL at 23:18

## 2021-08-21 RX ADMIN — QUETIAPINE FUMARATE 50 MG: 25 TABLET ORAL at 17:38

## 2021-08-21 RX ADMIN — PROPRANOLOL HYDROCHLORIDE 20 MG: 20 TABLET ORAL at 06:12

## 2021-08-21 NOTE — UTILIZATION REVIEW
Initial Clinical Review    OBS 8/18 @ 0031 UPGRADED TO INPATIENT 8/20 @ 1729 FOR CONTINUED TX OF ANXIETY D/O, CHRONIC PAIN MANAGEMENT AND NEED FOR SAFE D/C PLAN    Admission: Date/Time/Statement:   Admission Orders (From admission, onward)     Ordered        08/20/21 1729  Inpatient Admission  Once         08/18/21 0031  Place in Observation  Once                   Orders Placed This Encounter   Procedures    Inpatient Admission     Standing Status:   Standing     Number of Occurrences:   1     Order Specific Question:   Level of Care     Answer:   Med Surg [16]     Order Specific Question:   Estimated length of stay     Answer:   More than 2 Midnights     Order Specific Question:   Certification     Answer:   I certify that inpatient services are medically necessary for this patient for a duration of greater than two midnights  See H&P and MD Progress Notes for additional information about the patient's course of treatment  ED Arrival Information     Expected Arrival Acuity    - 8/17/2021 22:37 Urgent         Means of arrival Escorted by Service Admission type    Walk-In Family Member General Medicine Urgent         Arrival complaint    anxiety attack,leg pain        Chief Complaint   Patient presents with    Anxiety     pt anxious and afraid to be at home by herself when her son goes back to work  pt wants to go to a rehab facility   Leg Pain     pt with hx of arthritis and in need of knee replacement c/o bilateral leg pain  Initial Presentation: 59 y/o female to ED with panic attack and SNF placement  Seen in clinic earlier today with son  Seen in ED on 8/16 for panic attack when her son wanted to leave the house without her  Solomon Erb is returning to work today on 08/18 after taking leave to help take care of his mother  Pt concerned she is unable to care for self and will fall when son is not there  She uses walker and has wheelchair but unable to use d/t inadequate space to move around   She is also unable to self propel d/t decreased strength  PMH for Extensive psychiatric history on several psychotropic medications, Chronic pain syndrome, opioid dependence, HTN, HLD, prior CVA, Tardive dyskinesia, Esophageal reflux  In ED c/o generalized pain and nausea  Admit Observation level of care for Ambulatory dysfunction, decreased physical mobility, at risk for falls   She uses walker and wheelchair at home  Requesting SNF placement  Avoid extremes of BP and avoid hypoglycemia  Keep personal items and call bell close to the patient  Continue home meds  Hold off on opioids at this time due to concern of CNS depression and polypharmacy  Pain control  8/19 -- continue current po meds  PT/OT traals recommending IP post acute rehab   and Psychiatry consulted for placement  Continue scheduled tylenol, lidocaine, added lyrica and prn oxy  Per CM notes; received fax from [facility], a determination letter from May, 2021  Pt has had an inpatient psych stay since that time which changes the PASRR  CM called  [employer] and left a voicemail, awaiting return call to discuss  Received call back from Jane Das with [facility] who confirmed that the options process must be completed again, previous LOD is ineligible at this time  Psych consult faxed to [place]  Await determination later    8/20 -- Await IP rehab placement -- upgraded to inpatient admisison     Per  notes; [address] [facility] unable to accept  [facility] looking for bed availability, [facility] will check next week for bed availability  Referrals expanded to a 40 mile radius  8/21 -- Pt with some anxiety this AM, reports she felt shaky  Patient reports that her pain is well controlled on her new pain regimen  Patient reports she is moving her bowel bladder with no issue  Psychiatry consulted, Confirmed psych medication regimen following d/c from [address] psych meds  Continue pain control, Aqua K pad added  Awaiting IP rehab placement          ED Triage Vitals   Temperature Pulse Respirations Blood Pressure SpO2   08/17/21 2257 08/17/21 2257 08/17/21 2257 08/17/21 2257 08/17/21 2257   98 4 °F (36 9 °C) 81 20 164/99 99 %      Temp Source Heart Rate Source Patient Position - Orthostatic VS BP Location FiO2 (%)   08/19/21 2347 08/18/21 0110 08/18/21 0110 08/18/21 0110 --   Oral Monitor Lying Left arm       Pain Score       08/18/21 0121       8          Wt Readings from Last 1 Encounters:   08/17/21 88 8 kg (195 lb 12 3 oz)     Additional Vital Signs:   Date/Time  Temp  Pulse  Resp  BP  MAP (mmHg)  SpO2  O2 Device   08/21/21 0900  --  --  --  --  --  --  None (Room air)   08/21/21 07:13:15  97 5 °F (36 4 °C)  53Abnormal   18  110/68  82  98 %  --   08/20/21 0756  --  --  --  --  --  99 %  None (Room air)   08/20/21 07:32:05  97 6 °F (36 4 °C)  59  20  131/81  98  99 %  --   08/19/21 23:47:23  98 2 °F (36 8 °C)  66  16  134/86  102  96 %  None (Room air)   08/19/21 12:44:19  --  64  --  148/96  113  99 %  --   08/19/21 1243  --  60  --  148/96  --  --  --   08/19/21 06:20:56  --  56  --  140/82  101  99 %  --       Pertinent Labs/Diagnostic Test Results:     Results from last 7 days   Lab Units 08/21/21  0445 08/18/21  0531   WBC Thousand/uL 3 73* 5 16   HEMOGLOBIN g/dL 13 3 13 1   HEMATOCRIT % 41 1 39 3   PLATELETS Thousands/uL 264 257   NEUTROS ABS Thousands/µL 1 72* 3 24     Results from last 7 days   Lab Units 08/21/21  0445 08/20/21  0559 08/19/21  0638 08/18/21  0531   SODIUM mmol/L 143 139 140 142   POTASSIUM mmol/L 3 7 3 5 3 9 3 1*   CHLORIDE mmol/L 110* 110* 112* 110*   CO2 mmol/L 28 22 24 27   ANION GAP mmol/L 5 7 4 5   BUN mg/dL 17 13 9 8   CREATININE mg/dL 0 82 0 73 0 68 0 89   EGFR ml/min/1 73sq m 92 106 112 83   CALCIUM mg/dL 8 4 8 5 8 4 8 8     Results from last 7 days   Lab Units 08/18/21  0531   AST U/L 12   ALT U/L 18   ALK PHOS U/L 104   TOTAL PROTEIN g/dL 6 9   ALBUMIN g/dL 3 6   TOTAL BILIRUBIN mg/dL 1 16*       Results from last 7 days Lab Units 08/21/21  0445 08/20/21  0559 08/19/21  0638 08/18/21  0531   GLUCOSE RANDOM mg/dL 88 107 109 103     Results from last 7 days   Lab Units 08/16/21  1603   D-DIMER QUANTITATIVE ug/ml FEU 0 38       ED Treatment:   Medication Administration from 08/17/2021 2237 to 08/18/2021 0602       Date/Time Order Dose Route Action     08/17/2021 2329 diazepam (VALIUM) tablet 5 mg 5 mg Oral Given     08/18/2021 0121 ketorolac (TORADOL) injection 15 mg 15 mg Intravenous Given     08/18/2021 0230 QUEtiapine (SEROquel) tablet 50 mg 50 mg Oral Given     08/18/2021 0230 acetaminophen (TYLENOL) tablet 650 mg 650 mg Oral Given     08/18/2021 0437 morphine (MS CONTIN) ER tablet 15 mg 15 mg Oral Given     Past Medical History:   Diagnosis Date    Acid reflux     Anxiety     RESOLVED: [id number]    Arthritis     Bipolar 2 disorder (HCC)     FOLLOWS WITH PSYCHIATRIST  CONTINUE LAMOTRIGINE; RESOLVED: [id number]    Depression     Familial tremor     both hands    Fibromyalgia     LAST ASSESSED: [id number]    Hearing aid worn     left ear    [place] (hard of hearing)     left ear    Hypertension     Left-sided weakness     Lower back pain     Memory loss of unknown cause     long and short term    Migraine     Obesity     Obesity, Class II, BMI 35-39 9     Overactive bladder     Panic attack     Post traumatic stress disorder     Seasonal allergies     Stroke [facility])     questionable stroke 2009    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  CONT ASA 81 MG DAILY; RESOLVED: [id number]    Urinary incontinence     Wears dentures     partial lower / full upper    Wears glasses      Present on Admission:   Ambulatory dysfunction   (Resolved) Chronic bilateral low back pain with bilateral sciatica   Generalized anxiety disorder   Tardive dyskinesia   Mixed hyperlipidemia   Chronic pain syndrome   Anxiety   Esophageal reflux      Admitting Diagnosis:  Anxiety [F41 9]  Leg pain [M79 606]  Anxiety attack [F41 0]  Chronic leg pain [M33 606, D88 29]  Age/Sex: 60 y o  female  Admission Orders:  Scheduled Medications:  acetaminophen, 650 mg, Oral, Q8H [address]  amLODIPine, 5 mg, Oral, Daily  ascorbic acid, 500 mg, Oral, BID  aspirin, 81 mg, Oral, Daily  atorvastatin, 40 mg, Oral, Daily With Dinner  busPIRone, 15 mg, Oral, TID  Diclofenac Sodium, 2 g, Topical, 4x Daily  enoxaparin, 40 mg, Subcutaneous, Daily  ferrous sulfate, 325 mg, Oral, Daily With Breakfast  folic acid, 7,414 mcg, Oral, Daily  lidocaine, 1 patch, Topical, Daily  lithium carbonate, 300 mg, Oral, HS  pantoprazole, 20 mg, Oral, Early Morning  PARoxetine, 60 mg, Oral, Daily  prazosin, 2 mg, Oral, Daily  pregabalin, 100 mg, Oral, BID  propranolol, 20 mg, Oral, BID before breakfast/lunch  QUEtiapine, 50 mg, Oral, 4x Daily (PC & HS)  Valbenazine Tosylate, 80 mg, Oral, Daily         PRN Meds:  Ketorolac 15 mg IV PRN 8/18 x4, 8/19 x3 then d/c'd  hydrOXYzine HCL, 50 mg, Oral, TID PRN  LORazepam, 0 5 mg, Oral, Q8H PRN 8/18 x1, 8/19 x2, 8/20 x1  mirtazapine, 7 5 mg, Oral, HS PRN 8/18 x1, 8/20 x1  ondansetron, 4 mg, Oral, Q6H PRN  oxyCODONE, 5 mg, Oral, Q12H PRN 8/19 x1, 8/20 x2, 8/21 x1  senna-docusate sodium, 1 tablet, Oral, Daily PRN        IP CONSULT TO CASE MANAGEMENT  IP CONSULT TO PSYCHIATRY    Network Utilization Review Department  ATTENTION: Please call with any questions or concerns to [phone] and carefully listen to the prompts so that you are directed to the right person  All voicemails are confidential   Enmanuel Blum all requests for admission clinical reviews, approved or denied determinations and any other requests to dedicated fax number below belonging to the campus where the patient is receiving treatment   List of dedicated fax numbers for the Facilities:  [address] DENIALS (Administrative/Medical Necessity) [phone]   [address]  (Maternity/NICU/Pediatrics) [phone]   [address] [facility] [address] [place] [address] [address] [facility] [address] [id number] [place] [address] Lorenza Baker [facility] [phone] [place] [address] [phone]

## 2021-08-21 NOTE — PROGRESS NOTES
INTERNAL MEDICINE RESIDENCY PROGRESS NOTE     Name: Ju Quiñonez   Age & Sex: 60 y o  female   MRN: [id number]  Unit/Bed#: -01   Encounter: [id number]  Team: SOD Team A    PATIENT INFORMATION     Name: Ju Quiñonez   Age & Sex: 60 y o  female   MRN: [id number]  Hospital Stay Days: 1    ASSESSMENT/PLAN     Disposition:  Still waiting for placement  Case management- [facility] is checking next week for bed availability  Added heating pad for back pain  Continue pain control, otherwise medically stable  * Ambulatory dysfunction  Assessment & Plan  Patient with decreased physical mobility, at risk for falls  She uses walker and wheelchair at home  Presented to the ED requesting placement to SNF  · Encourage good body mechanics and assist with transfers  · Keep personal items and call bell close to the patient  · PT and OT- recommends post acute rehab  ·  and Psychiatry consulted for placement    Generalized anxiety disorder  Assessment & Plan  Patient is on multiple psychiatric medications and was recently evaluated by inpatient psych at [address] [place] [address] with adjustments to her medications including buspirone 50 mg t i d , paroxetine 60 mg daily, quetiapine 50 mg 4 times daily, mirtazapine 7 5 mg q h s , lithium 300 mg q h s  And hydroxyzine 50 mg t i d  P r n  Lakeshamike Lyman Patient continues to have increased anxiety with daily morning panic attacks  Patient also reports that her anxiety is worsened by her lack of pain control  Plan:  · Psychiatry consulted- appreciate recommendations  · Confirmed psych medication regimen following discharge from [address] [place] [address] as noted above  Chronic pain syndrome  Assessment & Plan  Patient has been on chronic opioids for low back and leg pain  She was seen in the clinic and discussed opioid tapering  Most recent opioid prescription per PDMP was for morphine 15 mg for 10 days (20 tablets)    Per chart review, concern for polypharmacy and suspicion of drug-seeking  · Scheduled Tylenol  · Lidocaine patch/Voltaren gel  · Added Lyrica 100 mg b i d    · Added Oxy IR 5 mg q 12h p r n  moderate pain   · Added Aqua K       History of CVA (cerebrovascular accident)  Assessment & Plan  Continue aspirin and statin  Tardive dyskinesia  Assessment & Plan  -Continue Valbenazine 80 mg daily  -appears to have slightly improved with addition of Lyrica    Esophageal reflux  Assessment & Plan  Continue Protonix 20 mg daily  Chronic bilateral low back pain with bilateral sciatica-resolved as of 2021  Assessment & Plan  See pain regimen above      SUBJECTIVE     Patient seen and examined  No acute events overnight  Patient felt shaky this morning  Patient reports she may have been having some anxiety this morning  Patient reports that her pain is well controlled on her new pain regimen  Patient reports she is moving her bowel bladder with no issue  Patient denies any fevers, chills, chest pain, shortness of breath, nausea, vomiting, diarrhea  OBJECTIVE     Vitals:    21 2340 21 0612 21 0614 21 0713   BP: 100/58 124/85 124/85 110/68   Pulse: 63 59 59 (!) 53   Resp:    18   Temp: 98 2 °F (36 8 °C)  98 3 °F (36 8 °C) 97 5 °F (36 4 °C)   TempSrc:       SpO2: 91%  97% 98%   Weight:          Temperature:   Temp (24hrs), Av 1 °F (36 7 °C), Min:97 5 °F (36 4 °C), Max:98 3 °F (36 8 °C)    Temperature: 97 5 °F (36 4 °C)  Intake & Output:  I/O        07 -  0700  07 -  0700  07 -  0700    P  O  360 465 240    Total Intake(mL/kg) 360 (4 1) 465 (5 2) 240 (2 7)    Urine (mL/kg/hr) 250 (0 1) 400 (0 2)     Total Output 250 400     Net +110 +65 +240               Weights:        Body mass index is 36 99 kg/m²  Weight (last 2 days)     None          Physical Exam:   General: Awake, alert, and conversant  No acute distress  Eyes: Normal conjunctiva, anicteric   Round symmetric pupils  ENT: Hearing grossly intact  No nasal discharge  Neck: Neck is supple  No masses or thyromegaly  Respiratory:  Clear to auscultation bilaterally  Respirations are non-labored  No wheezing  Cardiovascular:  Regular rate rhythm  No lower extremity edema  Skin: Warm  No rashes or ulcers  Neuro: A&O x 3  Sensation and CN II-XII grossly normal    Psych: Cooperative  Appropriate mood and affect  Normal judgement  LABORATORY DATA     Labs: I have personally reviewed pertinent reports  Results from last 7 days   Lab Units 08/21/21  0445 08/18/21  0531   WBC Thousand/uL 3 73* 5 16   HEMOGLOBIN g/dL 13 3 13 1   HEMATOCRIT % 41 1 39 3   PLATELETS Thousands/uL 264 257   NEUTROS PCT % 46 62   MONOS PCT % 11 10      Results from last 7 days   Lab Units 08/21/21  0445 08/20/21  0559 08/19/21  0638 08/18/21  0531   POTASSIUM mmol/L 3 7 3 5 3 9 3 1*   CHLORIDE mmol/L 110* 110* 112* 110*   CO2 mmol/L 28 22 24 27   BUN mg/dL 17 13 9 8   CREATININE mg/dL 0 82 0 73 0 68 0 89   CALCIUM mg/dL 8 4 8 5 8 4 8 8   ALK PHOS U/L  --   --   --  104   ALT U/L  --   --   --  18   AST U/L  --   --   --  12                            IMAGING & DIAGNOSTIC TESTING     Radiology Results: I have personally reviewed pertinent reports  No results found  Other Diagnostic Testing: I have personally reviewed pertinent reports      ACTIVE MEDICATIONS     Current Facility-Administered Medications   Medication Dose Route Frequency    acetaminophen (TYLENOL) tablet 650 mg  650 mg Oral Q8H [address]    amLODIPine (NORVASC) tablet 5 mg  5 mg Oral Daily    ascorbic acid (VITAMIN C) tablet 500 mg  500 mg Oral BID    aspirin chewable tablet 81 mg  81 mg Oral Daily    atorvastatin (LIPITOR) tablet 40 mg  40 mg Oral Daily With Dinner    busPIRone (BUSPAR) tablet 15 mg  15 mg Oral TID    Diclofenac Sodium (VOLTAREN) 1 % topical gel 2 g  2 g Topical 4x Daily    enoxaparin (LOVENOX) subcutaneous injection 40 mg  40 mg Subcutaneous Daily  ferrous sulfate tablet 325 mg  325 mg Oral Daily With Breakfast    folic acid (FOLVITE) tablet 1,000 mcg  1,000 mcg Oral Daily    hydrOXYzine HCL (ATARAX) tablet 50 mg  50 mg Oral TID PRN    lidocaine (LIDODERM) 5 % patch 1 patch  1 patch Topical Daily    lithium carbonate (LITHOBID) CR tablet 300 mg  300 mg Oral HS    LORazepam (ATIVAN) tablet 0 5 mg  0 5 mg Oral Q8H PRN    mirtazapine (REMERON) tablet 7 5 mg  7 5 mg Oral HS PRN    ondansetron (ZOFRAN-ODT) dispersible tablet 4 mg  4 mg Oral Q6H PRN    oxyCODONE (ROXICODONE) IR tablet 5 mg  5 mg Oral Q12H PRN    pantoprazole (PROTONIX) EC tablet 20 mg  20 mg Oral Early Morning    PARoxetine (PAXIL) tablet 60 mg  60 mg Oral Daily    prazosin (MINIPRESS) capsule 2 mg  2 mg Oral Daily    pregabalin (LYRICA) capsule 100 mg  100 mg Oral BID    propranolol (INDERAL) tablet 20 mg  20 mg Oral BID before breakfast/lunch    QUEtiapine (SEROquel) tablet 50 mg  50 mg Oral 4x Daily (PC & HS)    senna-docusate sodium (SENOKOT S) 8 6-50 mg per tablet 1 tablet  1 tablet Oral Daily PRN    Valbenazine Tosylate CAPS 80 mg  80 mg Oral Daily       VTE Pharmacologic Prophylaxis: Enoxaparin (Lovenox)  VTE Mechanical Prophylaxis: sequential compression device    Portions of the record may have been created with voice recognition software  Occasional wrong word or "sound a like" substitutions may have occurred due to the inherent limitations of voice recognition software  Read the chart carefully and recognize, using context, where substitutions have occurred    ==  Jeff Brito, [facility]  Internal Medicine Residency PGY-1

## 2021-08-21 NOTE — PROGRESS NOTES
INTERNAL MEDICINE RESIDENCY PROGRESS NOTE     Name: Jihan Condon   Age & Sex: 60 y o  female   MRN: [id number]  Unit/Bed#: -01   Encounter: [id number]  Team: SOD Team A    PATIENT INFORMATION     Name: Jihan Condon   Age & Sex: 60 y o  female   MRN: [id number]  Hospital Stay Days: 2    ASSESSMENT/PLAN     Principal Problem:    Ambulatory dysfunction  Active Problems:    Chronic pain syndrome    Esophageal reflux    Generalized anxiety disorder    Tardive dyskinesia    History of CVA (cerebrovascular accident)    Mixed hyperlipidemia      * Ambulatory dysfunction  Assessment & Plan  Patient with decreased physical mobility, at risk for falls  She uses walker and wheelchair at home  Presented to the ED requesting placement to SNF  · Encourage good body mechanics and assist with transfers  · Keep personal items and call bell close to the patient  · PT and OT- recommends post acute rehab  ·  and Psychiatry consulted for placement    Mixed hyperlipidemia  Assessment & Plan  Continue atorvastatin    History of CVA (cerebrovascular accident)  Assessment & Plan  Continue aspirin and statin  Tardive dyskinesia  Assessment & Plan  -Continue Valbenazine 80 mg daily  -appears to have slightly improved with addition of Lyrica    Generalized anxiety disorder  Assessment & Plan  Patient is on multiple psychiatric medications and was recently evaluated by inpatient psych at [facility] with adjustments to her medications including buspirone 50 mg t i d , paroxetine 60 mg daily, quetiapine 50 mg 4 times daily, mirtazapine 7 5 mg q h s , lithium 300 mg q h s  And hydroxyzine 50 mg t i d  P r n  Marco Kasnikole Patient continues to have increased anxiety with daily morning panic attacks  Patient also reports that her anxiety is worsened by her lack of pain control      Plan:  · Psychiatry consulted- appreciate recommendations  · Confirmed psych medication regimen following discharge from [facility]- Continue psych meds as noted above  Esophageal reflux  Assessment & Plan  Continue Protonix 20 mg daily  Chronic pain syndrome  Assessment & Plan  Patient has been on chronic opioids for low back and leg pain  She was seen in the clinic and discussed opioid tapering  Most recent opioid prescription per PDMP was for morphine 15 mg for 10 days (20 tablets)  Per chart review, concern for polypharmacy and suspicion of drug-seeking  · Scheduled Tylenol  · Lidocaine patch/Voltaren gel  · Added Lyrica 100 mg b i d    · Added Oxy IR 5 mg q 12h p r n  moderate pain -->may consider changing to q8h depending on patient usage  · Added Aqua K     Chronic bilateral low back pain with bilateral sciatica-resolved as of 2021  Assessment & Plan  See pain regimen above      Disposition: Patient here for ambulatory dysfunction and panic/anxiety disorder  Awaiting placement at [facility] and for subacute rehab  SUBJECTIVE     Patient seen and examined  No acute events overnight  She states that her anxiety is better this morning  Still experiences intermittent pain, more localized to the right lower back, right hip, and right leg  Since having her pain pill this morning, she feels better  Had 1 episode of bowel movement yesterday, but the stool was hard  Normal bladder movements and appetite  OBJECTIVE     Vitals:    21 0643 21 0725 21 0738 21 1148   BP: (!) 168/112 130/92  116/74   Pulse: 76 65  71   Resp:  18     Temp:  98 4 °F (36 9 °C)     TempSrc:       SpO2: 99% 99% 99% 99%   Weight:          Temperature:   Temp (24hrs), Av 4 °F (36 9 °C), Min:98 4 °F (36 9 °C), Max:98 5 °F (36 9 °C)    Temperature: 98 4 °F (36 9 °C)  Intake & Output:  I/O        07 -  0700  07 -  0700    P  O  465 465    Total Intake(mL/kg) 465 (5 2) 465 (5 2)    Urine (mL/kg/hr) 400 (0 2)     Total Output 400     Net +65 +465          Unmeasured Urine Occurrence  2 x Weights:        Body mass index is 36 99 kg/m²  Weight (last 2 days)     None        Physical Exam:   General: Awake, alert, and conversant  No acute distress  Eyes: Normal conjunctiva, anicteric  Round symmetric pupils  ENT: Hearing grossly intact  No nasal discharge  Neck: Neck is supple  No masses or thyromegaly  Respiratory:  Clear to auscultation bilaterally  Respirations are non-labored  No wheezing  Cardiovascular:  Regular rate rhythm  No lower extremity edema  Skin: Warm  No rashes or ulcers  Neuro: A&O x 3  Sensation and CN II-XII grossly normal    Psych: Cooperative  Appropriate mood and affect  Normal judgement  MSK:  Some slight tenderness to palpation along anterior right hip, lower right back, and right ITB band into calf  LABORATORY DATA     Labs: I have personally reviewed pertinent reports  Results from last 7 days   Lab Units 08/22/21  0503 08/21/21  0445 08/18/21  0531   WBC Thousand/uL 3 96* 3 73* 5 16   HEMOGLOBIN g/dL 12 9 13 3 13 1   HEMATOCRIT % 40 3 41 1 39 3   PLATELETS Thousands/uL 251 264 257   NEUTROS PCT % 49 46 62   MONOS PCT % 9 11 10      Results from last 7 days   Lab Units 08/22/21  0503 08/21/21  0445 08/20/21  0559 08/18/21  0531   POTASSIUM mmol/L 3 5 3 7 3 5 3 1*   CHLORIDE mmol/L 111* 110* 110* 110*   CO2 mmol/L 28 28 22 27   BUN mg/dL 12 17 13 8   CREATININE mg/dL 0 71 0 82 0 73 0 89   CALCIUM mg/dL 8 3 8 4 8 5 8 8   ALK PHOS U/L  --   --   --  104   ALT U/L  --   --   --  18   AST U/L  --   --   --  12                            IMAGING & DIAGNOSTIC TESTING     Radiology Results: I have personally reviewed pertinent reports  No results found  Other Diagnostic Testing: I have personally reviewed pertinent reports      ACTIVE MEDICATIONS     Current Facility-Administered Medications   Medication Dose Route Frequency    acetaminophen (TYLENOL) tablet 650 mg  650 mg Oral Q8H [facility]    amLODIPine (NORVASC) tablet 5 mg  5 mg Oral Daily    ascorbic acid (VITAMIN C) tablet 500 mg  500 mg Oral BID    aspirin chewable tablet 81 mg  81 mg Oral Daily    atorvastatin (LIPITOR) tablet 40 mg  40 mg Oral Daily With Dinner    busPIRone (BUSPAR) tablet 15 mg  15 mg Oral TID    Diclofenac Sodium (VOLTAREN) 1 % topical gel 2 g  2 g Topical 4x Daily    enoxaparin (LOVENOX) subcutaneous injection 40 mg  40 mg Subcutaneous Daily    ferrous sulfate tablet 325 mg  325 mg Oral Daily With Breakfast    folic acid (FOLVITE) tablet 1,000 mcg  1,000 mcg Oral Daily    hydrOXYzine HCL (ATARAX) tablet 50 mg  50 mg Oral TID PRN    lidocaine (LIDODERM) 5 % patch 1 patch  1 patch Topical Daily    lithium carbonate (LITHOBID) CR tablet 300 mg  300 mg Oral HS    LORazepam (ATIVAN) tablet 0 5 mg  0 5 mg Oral Q8H PRN    mirtazapine (REMERON) tablet 7 5 mg  7 5 mg Oral HS PRN    ondansetron (ZOFRAN-ODT) dispersible tablet 4 mg  4 mg Oral Q6H PRN    oxyCODONE (ROXICODONE) IR tablet 5 mg  5 mg Oral Q12H PRN    pantoprazole (PROTONIX) EC tablet 20 mg  20 mg Oral Early Morning    PARoxetine (PAXIL) tablet 60 mg  60 mg Oral Daily    polyethylene glycol (MIRALAX) packet 17 g  17 g Oral Daily    prazosin (MINIPRESS) capsule 2 mg  2 mg Oral Daily    pregabalin (LYRICA) capsule 100 mg  100 mg Oral BID    propranolol (INDERAL) tablet 20 mg  20 mg Oral BID before breakfast/lunch    QUEtiapine (SEROquel) tablet 50 mg  50 mg Oral 4x Daily (PC & HS)    senna-docusate sodium (SENOKOT S) 8 6-50 mg per tablet 1 tablet  1 tablet Oral Daily PRN    Valbenazine Tosylate CAPS 80 mg  80 mg Oral Daily       VTE Pharmacologic Prophylaxis: Enoxaparin (Lovenox)  VTE Mechanical Prophylaxis: sequential compression device    Portions of the record may have been created with voice recognition software  Occasional wrong word or "sound a like" substitutions may have occurred due to the inherent limitations of voice recognition software    Read the chart carefully and recognize, using context, where substitutions have occurred   ==  Lb Byrne DO  [facility]  Internal Medicine Residency PGY-2

## 2021-08-22 LAB
AMPHETAMINES UR QL SCN: NEGATIVE NG/ML
ANION GAP SERPL CALCULATED.3IONS-SCNC: 3 MMOL/L (ref 4–13)
BARBITURATES UR QL SCN: NEGATIVE NG/ML
BASOPHILS # BLD AUTO: 0.05 THOUSANDS/ΜL (ref 0–0.1)
BASOPHILS NFR BLD AUTO: 1 % (ref 0–1)
BENZODIAZ UR QL: NEGATIVE NG/ML
BUN SERPL-MCNC: 12 MG/DL (ref 5–25)
BZE UR QL: NEGATIVE NG/ML
CALCIUM SERPL-MCNC: 8.3 MG/DL (ref 8.3–10.1)
CANNABINOIDS UR QL SCN: NEGATIVE NG/ML
CHLORIDE SERPL-SCNC: 111 MMOL/L (ref 100–108)
CO2 SERPL-SCNC: 28 MMOL/L (ref 21–32)
CREAT SERPL-MCNC: 0.71 MG/DL (ref 0.6–1.3)
EOSINOPHIL # BLD AUTO: 0.21 THOUSAND/ΜL (ref 0–0.61)
EOSINOPHIL NFR BLD AUTO: 5 % (ref 0–6)
ERYTHROCYTE [DISTWIDTH] IN BLOOD BY AUTOMATED COUNT: 13.8 % (ref 11.6–15.1)
GFR SERPL CREATININE-BSD FRML MDRD: 109 ML/MIN/1.73SQ M
GLUCOSE SERPL-MCNC: 93 MG/DL (ref 65–140)
HCT VFR BLD AUTO: 40.3 % (ref 34.8–46.1)
HGB BLD-MCNC: 12.9 G/DL (ref 11.5–15.4)
IMM GRANULOCYTES # BLD AUTO: 0.01 THOUSAND/UL (ref 0–0.2)
IMM GRANULOCYTES NFR BLD AUTO: 0 % (ref 0–2)
LYMPHOCYTES # BLD AUTO: 1.43 THOUSANDS/ΜL (ref 0.6–4.47)
LYMPHOCYTES NFR BLD AUTO: 36 % (ref 14–44)
MCH RBC QN AUTO: 29.6 PG (ref 26.8–34.3)
MCHC RBC AUTO-ENTMCNC: 32 G/DL (ref 31.4–37.4)
MCV RBC AUTO: 92 FL (ref 82–98)
METHADONE UR QL SCN: NEGATIVE NG/ML
MONOCYTES # BLD AUTO: 0.35 THOUSAND/ΜL (ref 0.17–1.22)
MONOCYTES NFR BLD AUTO: 9 % (ref 4–12)
NEUTROPHILS # BLD AUTO: 1.91 THOUSANDS/ΜL (ref 1.85–7.62)
NEUTS SEG NFR BLD AUTO: 49 % (ref 43–75)
NRBC BLD AUTO-RTO: 0 /100 WBCS
OPIATES UR QL: POSITIVE
PCP UR QL: NEGATIVE
PLATELET # BLD AUTO: 251 THOUSANDS/UL (ref 149–390)
PMV BLD AUTO: 9.8 FL (ref 8.9–12.7)
POTASSIUM SERPL-SCNC: 3.5 MMOL/L (ref 3.5–5.3)
PROPOXYPH UR QL SCN: NEGATIVE NG/ML
RBC # BLD AUTO: 4.36 MILLION/UL (ref 3.81–5.12)
SODIUM SERPL-SCNC: 142 MMOL/L (ref 136–145)
WBC # BLD AUTO: 3.96 THOUSAND/UL (ref 4.31–10.16)

## 2021-08-22 PROCEDURE — 99233 SBSQ HOSP IP/OBS HIGH 50: CPT | Performed by: INTERNAL MEDICINE

## 2021-08-22 PROCEDURE — 80048 BASIC METABOLIC PNL TOTAL CA: CPT | Performed by: STUDENT IN AN ORGANIZED HEALTH CARE EDUCATION/TRAINING PROGRAM

## 2021-08-22 PROCEDURE — 85025 COMPLETE CBC W/AUTO DIFF WBC: CPT | Performed by: STUDENT IN AN ORGANIZED HEALTH CARE EDUCATION/TRAINING PROGRAM

## 2021-08-22 RX ORDER — POLYETHYLENE GLYCOL 3350 17 G/17G
17 POWDER, FOR SOLUTION ORAL DAILY
Status: DISCONTINUED | OUTPATIENT
Start: 2021-08-22 | End: 2021-09-23 | Stop reason: HOSPADM

## 2021-08-22 RX ADMIN — DICLOFENAC SODIUM 2 G: 10 GEL TOPICAL at 08:50

## 2021-08-22 RX ADMIN — FOLIC ACID 1000 MCG: 1 TABLET ORAL at 08:50

## 2021-08-22 RX ADMIN — ACETAMINOPHEN 650 MG: 325 TABLET, FILM COATED ORAL at 06:41

## 2021-08-22 RX ADMIN — LITHIUM CARBONATE 300 MG: 300 TABLET, FILM COATED, EXTENDED RELEASE ORAL at 01:03

## 2021-08-22 RX ADMIN — BUSPIRONE HYDROCHLORIDE 15 MG: 10 TABLET ORAL at 22:19

## 2021-08-22 RX ADMIN — QUETIAPINE FUMARATE 50 MG: 25 TABLET ORAL at 11:50

## 2021-08-22 RX ADMIN — PREGABALIN 100 MG: 100 CAPSULE ORAL at 08:51

## 2021-08-22 RX ADMIN — ACETAMINOPHEN 650 MG: 325 TABLET, FILM COATED ORAL at 22:20

## 2021-08-22 RX ADMIN — PROPRANOLOL HYDROCHLORIDE 20 MG: 20 TABLET ORAL at 06:42

## 2021-08-22 RX ADMIN — DICLOFENAC SODIUM 2 G: 10 GEL TOPICAL at 11:52

## 2021-08-22 RX ADMIN — ENOXAPARIN SODIUM 40 MG: 40 INJECTION SUBCUTANEOUS at 08:50

## 2021-08-22 RX ADMIN — FERROUS SULFATE TAB 325 MG (65 MG ELEMENTAL FE) 325 MG: 325 (65 FE) TAB at 08:50

## 2021-08-22 RX ADMIN — PRAZOSIN HYDROCHLORIDE 2 MG: 2 CAPSULE ORAL at 08:51

## 2021-08-22 RX ADMIN — LORAZEPAM 0.5 MG: 0.5 TABLET ORAL at 14:05

## 2021-08-22 RX ADMIN — PREGABALIN 100 MG: 100 CAPSULE ORAL at 17:24

## 2021-08-22 RX ADMIN — OXYCODONE HYDROCHLORIDE AND ACETAMINOPHEN 500 MG: 500 TABLET ORAL at 17:24

## 2021-08-22 RX ADMIN — POLYETHYLENE GLYCOL 3350 17 G: 17 POWDER, FOR SOLUTION ORAL at 08:51

## 2021-08-22 RX ADMIN — OXYCODONE HYDROCHLORIDE AND ACETAMINOPHEN 500 MG: 500 TABLET ORAL at 08:50

## 2021-08-22 RX ADMIN — LIDOCAINE 1 PATCH: 50 PATCH TOPICAL at 08:51

## 2021-08-22 RX ADMIN — ATORVASTATIN CALCIUM 40 MG: 40 TABLET, FILM COATED ORAL at 17:24

## 2021-08-22 RX ADMIN — PROPRANOLOL HYDROCHLORIDE 20 MG: 20 TABLET ORAL at 11:50

## 2021-08-22 RX ADMIN — OXYCODONE HYDROCHLORIDE 5 MG: 5 TABLET ORAL at 06:50

## 2021-08-22 RX ADMIN — LITHIUM CARBONATE 300 MG: 300 TABLET, FILM COATED, EXTENDED RELEASE ORAL at 22:18

## 2021-08-22 RX ADMIN — PANTOPRAZOLE SODIUM 20 MG: 20 TABLET, DELAYED RELEASE ORAL at 06:42

## 2021-08-22 RX ADMIN — VALBENAZINE 80 MG: 80 CAPSULE ORAL at 08:49

## 2021-08-22 RX ADMIN — BUSPIRONE HYDROCHLORIDE 15 MG: 10 TABLET ORAL at 17:23

## 2021-08-22 RX ADMIN — BUSPIRONE HYDROCHLORIDE 15 MG: 10 TABLET ORAL at 08:50

## 2021-08-22 RX ADMIN — OXYCODONE HYDROCHLORIDE 5 MG: 5 TABLET ORAL at 20:25

## 2021-08-22 RX ADMIN — DICLOFENAC SODIUM 2 G: 10 GEL TOPICAL at 17:25

## 2021-08-22 RX ADMIN — PAROXETINE HYDROCHLORIDE 60 MG: 20 TABLET, FILM COATED ORAL at 22:18

## 2021-08-22 RX ADMIN — MIRTAZAPINE 7.5 MG: 15 TABLET, FILM COATED ORAL at 22:19

## 2021-08-22 RX ADMIN — QUETIAPINE FUMARATE 50 MG: 25 TABLET ORAL at 22:19

## 2021-08-22 RX ADMIN — AMLODIPINE BESYLATE 5 MG: 5 TABLET ORAL at 08:50

## 2021-08-22 RX ADMIN — ACETAMINOPHEN 650 MG: 325 TABLET, FILM COATED ORAL at 14:05

## 2021-08-22 RX ADMIN — QUETIAPINE FUMARATE 50 MG: 25 TABLET ORAL at 08:50

## 2021-08-22 RX ADMIN — QUETIAPINE FUMARATE 50 MG: 25 TABLET ORAL at 17:24

## 2021-08-22 RX ADMIN — ASPIRIN 81 MG CHEWABLE TABLET 81 MG: 81 TABLET CHEWABLE at 08:50

## 2021-08-23 LAB
ANION GAP SERPL CALCULATED.3IONS-SCNC: 4 MMOL/L (ref 4–13)
BASOPHILS # BLD AUTO: 0.05 THOUSANDS/ΜL (ref 0–0.1)
BASOPHILS NFR BLD AUTO: 1 % (ref 0–1)
BUN SERPL-MCNC: 9 MG/DL (ref 5–25)
CALCIUM SERPL-MCNC: 8.1 MG/DL (ref 8.3–10.1)
CHLORIDE SERPL-SCNC: 111 MMOL/L (ref 100–108)
CO2 SERPL-SCNC: 27 MMOL/L (ref 21–32)
CREAT SERPL-MCNC: 0.66 MG/DL (ref 0.6–1.3)
EOSINOPHIL # BLD AUTO: 0.21 THOUSAND/ΜL (ref 0–0.61)
EOSINOPHIL NFR BLD AUTO: 5 % (ref 0–6)
ERYTHROCYTE [DISTWIDTH] IN BLOOD BY AUTOMATED COUNT: 13.8 % (ref 11.6–15.1)
GFR SERPL CREATININE-BSD FRML MDRD: 113 ML/MIN/1.73SQ M
GLUCOSE SERPL-MCNC: 88 MG/DL (ref 65–140)
HCT VFR BLD AUTO: 39.8 % (ref 34.8–46.1)
HGB BLD-MCNC: 13.1 G/DL (ref 11.5–15.4)
IMM GRANULOCYTES # BLD AUTO: 0.01 THOUSAND/UL (ref 0–0.2)
IMM GRANULOCYTES NFR BLD AUTO: 0 % (ref 0–2)
LYMPHOCYTES # BLD AUTO: 1.36 THOUSANDS/ΜL (ref 0.6–4.47)
LYMPHOCYTES NFR BLD AUTO: 30 % (ref 14–44)
MCH RBC QN AUTO: 29.9 PG (ref 26.8–34.3)
MCHC RBC AUTO-ENTMCNC: 32.9 G/DL (ref 31.4–37.4)
MCV RBC AUTO: 91 FL (ref 82–98)
MONOCYTES # BLD AUTO: 0.43 THOUSAND/ΜL (ref 0.17–1.22)
MONOCYTES NFR BLD AUTO: 9 % (ref 4–12)
NEUTROPHILS # BLD AUTO: 2.55 THOUSANDS/ΜL (ref 1.85–7.62)
NEUTS SEG NFR BLD AUTO: 55 % (ref 43–75)
NRBC BLD AUTO-RTO: 0 /100 WBCS
PLATELET # BLD AUTO: 240 THOUSANDS/UL (ref 149–390)
PMV BLD AUTO: 9.6 FL (ref 8.9–12.7)
POTASSIUM SERPL-SCNC: 3.4 MMOL/L (ref 3.5–5.3)
RBC # BLD AUTO: 4.38 MILLION/UL (ref 3.81–5.12)
SODIUM SERPL-SCNC: 142 MMOL/L (ref 136–145)
WBC # BLD AUTO: 4.61 THOUSAND/UL (ref 4.31–10.16)

## 2021-08-23 PROCEDURE — NC001 PR NO CHARGE: Performed by: INTERNAL MEDICINE

## 2021-08-23 PROCEDURE — 97116 GAIT TRAINING THERAPY: CPT

## 2021-08-23 PROCEDURE — 85025 COMPLETE CBC W/AUTO DIFF WBC: CPT | Performed by: STUDENT IN AN ORGANIZED HEALTH CARE EDUCATION/TRAINING PROGRAM

## 2021-08-23 PROCEDURE — 80048 BASIC METABOLIC PNL TOTAL CA: CPT | Performed by: STUDENT IN AN ORGANIZED HEALTH CARE EDUCATION/TRAINING PROGRAM

## 2021-08-23 PROCEDURE — 99232 SBSQ HOSP IP/OBS MODERATE 35: CPT | Performed by: INTERNAL MEDICINE

## 2021-08-23 PROCEDURE — 97530 THERAPEUTIC ACTIVITIES: CPT

## 2021-08-23 RX ORDER — PREGABALIN 100 MG/1
100 CAPSULE ORAL 3 TIMES DAILY
Status: DISCONTINUED | OUTPATIENT
Start: 2021-08-23 | End: 2021-09-23 | Stop reason: HOSPADM

## 2021-08-23 RX ORDER — POTASSIUM CHLORIDE 20 MEQ/1
40 TABLET, EXTENDED RELEASE ORAL ONCE
Status: COMPLETED | OUTPATIENT
Start: 2021-08-23 | End: 2021-08-23

## 2021-08-23 RX ADMIN — LIDOCAINE 1 PATCH: 50 PATCH TOPICAL at 08:16

## 2021-08-23 RX ADMIN — QUETIAPINE FUMARATE 50 MG: 25 TABLET ORAL at 21:47

## 2021-08-23 RX ADMIN — PANTOPRAZOLE SODIUM 20 MG: 20 TABLET, DELAYED RELEASE ORAL at 05:17

## 2021-08-23 RX ADMIN — ACETAMINOPHEN 650 MG: 325 TABLET, FILM COATED ORAL at 13:52

## 2021-08-23 RX ADMIN — PROPRANOLOL HYDROCHLORIDE 20 MG: 20 TABLET ORAL at 05:17

## 2021-08-23 RX ADMIN — DICLOFENAC SODIUM 2 G: 10 GEL TOPICAL at 21:47

## 2021-08-23 RX ADMIN — POTASSIUM CHLORIDE 40 MEQ: 1500 TABLET, EXTENDED RELEASE ORAL at 08:07

## 2021-08-23 RX ADMIN — DICLOFENAC SODIUM 2 G: 10 GEL TOPICAL at 17:03

## 2021-08-23 RX ADMIN — PRAZOSIN HYDROCHLORIDE 2 MG: 2 CAPSULE ORAL at 08:09

## 2021-08-23 RX ADMIN — VALBENAZINE 80 MG: 80 CAPSULE ORAL at 08:21

## 2021-08-23 RX ADMIN — PREGABALIN 100 MG: 100 CAPSULE ORAL at 08:08

## 2021-08-23 RX ADMIN — FERROUS SULFATE TAB 325 MG (65 MG ELEMENTAL FE) 325 MG: 325 (65 FE) TAB at 08:08

## 2021-08-23 RX ADMIN — QUETIAPINE FUMARATE 50 MG: 25 TABLET ORAL at 17:02

## 2021-08-23 RX ADMIN — BUSPIRONE HYDROCHLORIDE 15 MG: 10 TABLET ORAL at 16:23

## 2021-08-23 RX ADMIN — ACETAMINOPHEN 650 MG: 325 TABLET, FILM COATED ORAL at 05:17

## 2021-08-23 RX ADMIN — OXYCODONE HYDROCHLORIDE 5 MG: 5 TABLET ORAL at 21:47

## 2021-08-23 RX ADMIN — PREGABALIN 100 MG: 100 CAPSULE ORAL at 21:46

## 2021-08-23 RX ADMIN — LORAZEPAM 0.5 MG: 0.5 TABLET ORAL at 08:08

## 2021-08-23 RX ADMIN — OXYCODONE HYDROCHLORIDE 5 MG: 5 TABLET ORAL at 09:00

## 2021-08-23 RX ADMIN — PREGABALIN 100 MG: 100 CAPSULE ORAL at 16:23

## 2021-08-23 RX ADMIN — ATORVASTATIN CALCIUM 40 MG: 40 TABLET, FILM COATED ORAL at 16:23

## 2021-08-23 RX ADMIN — ASPIRIN 81 MG CHEWABLE TABLET 81 MG: 81 TABLET CHEWABLE at 08:08

## 2021-08-23 RX ADMIN — FOLIC ACID 1000 MCG: 1 TABLET ORAL at 08:08

## 2021-08-23 RX ADMIN — QUETIAPINE FUMARATE 50 MG: 25 TABLET ORAL at 11:30

## 2021-08-23 RX ADMIN — BUSPIRONE HYDROCHLORIDE 15 MG: 10 TABLET ORAL at 21:46

## 2021-08-23 RX ADMIN — BUSPIRONE HYDROCHLORIDE 15 MG: 10 TABLET ORAL at 08:08

## 2021-08-23 RX ADMIN — PROPRANOLOL HYDROCHLORIDE 20 MG: 20 TABLET ORAL at 11:26

## 2021-08-23 RX ADMIN — LORAZEPAM 0.5 MG: 0.5 TABLET ORAL at 16:23

## 2021-08-23 RX ADMIN — AMLODIPINE BESYLATE 5 MG: 5 TABLET ORAL at 08:08

## 2021-08-23 RX ADMIN — OXYCODONE HYDROCHLORIDE AND ACETAMINOPHEN 500 MG: 500 TABLET ORAL at 08:08

## 2021-08-23 RX ADMIN — POLYETHYLENE GLYCOL 3350 17 G: 17 POWDER, FOR SOLUTION ORAL at 08:09

## 2021-08-23 RX ADMIN — DICLOFENAC SODIUM 2 G: 10 GEL TOPICAL at 11:26

## 2021-08-23 RX ADMIN — ACETAMINOPHEN 650 MG: 325 TABLET, FILM COATED ORAL at 21:47

## 2021-08-23 RX ADMIN — QUETIAPINE FUMARATE 50 MG: 25 TABLET ORAL at 08:07

## 2021-08-23 RX ADMIN — OXYCODONE HYDROCHLORIDE AND ACETAMINOPHEN 500 MG: 500 TABLET ORAL at 17:02

## 2021-08-23 RX ADMIN — ENOXAPARIN SODIUM 40 MG: 40 INJECTION SUBCUTANEOUS at 08:09

## 2021-08-23 RX ADMIN — DICLOFENAC SODIUM 2 G: 10 GEL TOPICAL at 08:21

## 2021-08-23 NOTE — CASE MANAGEMENT
Per [employer], [employer] [facility] [employer] still reviewing  Per pt, she is not vaccinated for Covid  CM sent TT to PT inquiring about pt's ability to progress to home discharge as there is no accepting facility at this time  Per PT, recommendation remains for STR

## 2021-08-23 NOTE — PROGRESS NOTES
[facility]  Progress Note - Mee Sewell 1965, 60 y o  female MRN: [id number]  Unit/Bed#: -[facility] Encounter: [id number]  Primary Care Provider: Micheline Arshad MD   Date and time admitted to hospital: 8/17/2021 10:51 PM    Chronic pain syndrome  Assessment & Plan  Patient has been on chronic opioids for low back and leg pain  She was seen in the clinic and discussed opioid tapering  Most recent opioid prescription per PDMP was for morphine 15 mg for 10 days (20 tablets)  Per chart review, concern for polypharmacy and suspicion of drug-seeking  · Scheduled Tylenol  · Lidocaine patch/Voltaren gel  · Added Lyrica 100 mg b i d  8/19  · Added Oxy IR 5 mg q 12h p r n  moderate pain 8/19-->may consider changing to q8h depending on patient usage  · Added Aqua K 8/21    Generalized anxiety disorder  Assessment & Plan  Patient is on multiple psychiatric medications and was recently evaluated by inpatient psych at [address] [place] [address] with adjustments to her medications including buspirone 50 mg t i d , paroxetine 60 mg daily, quetiapine 50 mg 4 times daily, mirtazapine 7 5 mg q h s , lithium 300 mg q h s  And hydroxyzine 50 mg t i d  P r n  Marleen Mort Patient continues to have increased anxiety with daily morning panic attacks  Patient also reports that her anxiety is worsened by her lack of pain control  Plan:  · Psychiatry consulted- appreciate recommendations  · Confirmed psych medication regimen following discharge from [address] [place] [address] as noted above  * Ambulatory dysfunction  Assessment & Plan  Patient with decreased physical mobility, at risk for falls  She uses walker and wheelchair at home  Presented to the ED requesting placement to SNF  · Encourage good body mechanics and assist with transfers    · Keep personal items and call bell close to the patient  · PT and OT- recommends post acute rehab  ·  and Psychiatry consulted for placement    Mixed hyperlipidemia  Assessment & Plan  Continue atorvastatin    History of CVA (cerebrovascular accident)  Assessment & Plan  Continue aspirin and statin  Tardive dyskinesia  Assessment & Plan  -Continue Valbenazine 80 mg daily  -appears to have slightly improved with addition of Lyrica    Esophageal reflux  Assessment & Plan  Continue Protonix 20 mg daily  Chronic bilateral low back pain with bilateral sciatica-resolved as of 2021  Assessment & Plan  See pain regimen above      VTE Pharmacologic Prophylaxis: VTE Score: 4 Moderate Risk (Score 3-4) - Pharmacological DVT Prophylaxis Ordered: enoxaparin (Lovenox)  Patient Centered Rounds: I performed bedside rounds with nursing staff today  Discussions with Specialists or Other Care Team Provider: PT, OT    Education and Discussions with Family / Patient: Updated  (son) via phone  Time Spent for Care: 45 minutes  More than 50% of total time spent on counseling and coordination of care as described above  Current Length of Stay: 3 day(s)  Current Patient Status: Inpatient   Certification Statement: The patient will continue to require additional inpatient hospital stay due to pending placement for LTC  Discharge Plan: Anticipate discharge in 24-48 hrs to discharge location to be determined pending rehab evaluations  Code Status: Level 1 - Full Code    Subjective: The patient was awake and sitting up  She no longer experiences sx relating to her anxiety, however, she did endorse bilateral LE pain (more severe on the left side) and lower back pain  These are chronic issues for her  Additionally, the patient complains of new-onset post micturition incontinence  After urinating, the patient no longer experiences the urge to urinate, however, continues to drip urine  The patient denies fevers, chills, chest pain, SOB, and N/V/D      Objective:     Vitals:   Temp (24hrs), Av 3 °F (36 8 °C), Min:98 °F (36 7 °C), Max:98 7 °F (37 1 °C)    Temp:  [98 °F (36 7 °C)-98 7 °F (37 1 °C)] 98 3 °F (36 8 °C)  HR:  [55-65] 65  Resp:  [16-18] 18  BP: ()/(56-87) 139/87  SpO2:  [95 %-99 %] 97 %  Body mass index is 36 99 kg/m²  Input and Output Summary (last 24 hours): Intake/Output Summary (Last 24 hours) at 8/23/2021 1235  Last data filed at 8/23/2021 5158  Gross per 24 hour   Intake 1380 ml   Output 1500 ml   Net -120 ml       Physical Exam:   Physical Exam  Constitutional:       General: She is not in acute distress  Appearance: She is not ill-appearing or diaphoretic  HENT:      Head: Normocephalic and atraumatic  Nose: Nose normal       Mouth/Throat:      Mouth: Mucous membranes are dry  Pharynx: Oropharynx is clear  No oropharyngeal exudate  Eyes:      General: No scleral icterus  Extraocular Movements: Extraocular movements intact  Conjunctiva/sclera: Conjunctivae normal       Pupils: Pupils are equal, round, and reactive to light  Cardiovascular:      Rate and Rhythm: Normal rate and regular rhythm  Pulses: Normal pulses  Heart sounds: Normal heart sounds  Pulmonary:      Effort: Pulmonary effort is normal  No respiratory distress  Breath sounds: Normal breath sounds  No stridor  Abdominal:      General: Abdomen is flat  Bowel sounds are normal  There is no distension  Palpations: Abdomen is soft  Tenderness: There is no abdominal tenderness  Musculoskeletal:         General: Tenderness (LE bilaterally (most severe on left); lower back (midline)) present  No swelling or deformity  Normal range of motion  Cervical back: Normal range of motion and neck supple  No rigidity or tenderness  Skin:     General: Skin is warm and dry  Coloration: Skin is not jaundiced or pale  Neurological:      General: No focal deficit present  Mental Status: She is alert and oriented to person, place, and time  Mental status is at baseline  Cranial Nerves:  No cranial nerve deficit  Sensory: Sensory deficit (dec  sensation (anterior right thigh)) present  Psychiatric:         Mood and Affect: Mood normal          Behavior: Behavior normal          Thought Content: Thought content normal          Judgment: Judgment normal         Additional Data:     Labs:  Results from last 7 days   Lab Units 08/23/21  0457   WBC Thousand/uL 4 61   HEMOGLOBIN g/dL 13 1   HEMATOCRIT % 39 8   PLATELETS Thousands/uL 240   NEUTROS PCT % 55   LYMPHS PCT % 30   MONOS PCT % 9   EOS PCT % 5     Results from last 7 days   Lab Units 08/23/21 0457 08/18/21  0531   SODIUM mmol/L 142 142   POTASSIUM mmol/L 3 4* 3 1*   CHLORIDE mmol/L 111* 110*   CO2 mmol/L 27 27   BUN mg/dL 9 8   CREATININE mg/dL 0 66 0 89   ANION GAP mmol/L 4 5   CALCIUM mg/dL 8 1* 8 8   ALBUMIN g/dL  --  3 6   TOTAL BILIRUBIN mg/dL  --  1 16*   ALK PHOS U/L  --  104   ALT U/L  --  18   AST U/L  --  12   GLUCOSE RANDOM mg/dL 88 103                       Lines/Drains:  Invasive Devices     None                 Imaging: No pertinent imaging reviewed      Recent Cultures (last 7 days): None        Last 24 Hours Medication List:   Current Facility-Administered Medications   Medication Dose Route Frequency Provider Last Rate    acetaminophen  650 mg Oral Q8H Roddy Vale MD      amLODIPine  5 mg Oral Daily Denita Pa MD      ascorbic acid  500 mg Oral BID Denita Pa MD      aspirin  81 mg Oral Daily Denita Pa MD      atorvastatin  40 mg Oral Daily With Paul Seo MD      busPIRone  15 mg Oral TID Denita Pa MD      Diclofenac Sodium  2 g Topical 4x Daily Denita Pa MD      enoxaparin  40 mg Subcutaneous Daily Denita Pa MD      ferrous sulfate  325 mg Oral Daily With Vale Aguiar MD      folic acid  3,364 mcg Oral Daily Denita Pa MD      hydrOXYzine HCL  50 mg Oral TID PRN Denita Pa MD      lidocaine  1 patch Topical Daily Denita Pa MD      lithium carbonate  300 mg Oral HS Ayah Burroughs, MD      LORazepam  0 5 mg Oral Q8H PRN Dante Greer, DO      mirtazapine  7 5 mg Oral HS PRN Eusebia Trejo MD      ondansetron  4 mg Oral Q6H PRN Dante Greer, DO      oxyCODONE  5 mg Oral Q12H PRN Dante Greer, DO      pantoprazole  20 mg Oral Early Morning Eusebia Trejo MD      PARoxetine  60 mg Oral Daily Eusebia Trejo MD      polyethylene glycol  17 g Oral Daily Dante Greer, DO      prazosin  2 mg Oral Daily Eusebia Trejo MD      pregabalin  100 mg Oral TID Dante Greer, DO      propranolol  20 mg Oral BID before breakfast/lunch Eusebia Trejo MD      QUEtiapine  50 mg Oral 4x Daily (PC & HS) Eusebia Trejo MD      senna-docusate sodium  1 tablet Oral Daily PRN Eusebia Trejo MD      Valbenazine Tosylate  80 mg Oral Daily Eusebia Trejo MD          Today, Patient Was Seen By: Marc Merritt    **Please Note: This note may have been constructed using a voice recognition system  **

## 2021-08-23 NOTE — PLAN OF CARE
Problem: PHYSICAL THERAPY ADULT  Goal: Performs mobility at highest level of function for planned discharge setting  See evaluation for individualized goals  Description: Treatment/Interventions: Functional transfer training, LE strengthening/ROM, Elevations, Therapeutic exercise, Endurance training, Patient/family training, Equipment eval/education, Bed mobility, Gait training, Spoke to nursing, OT  Equipment Recommended: [place] Raisin       See flowsheet documentation for full assessment, interventions and recommendations  Outcome: Progressing  Note: Prognosis: Fair  Problem List: Decreased strength, Decreased range of motion, Decreased endurance, Impaired balance, Decreased mobility, Pain  Assessment: Pt continues to be limited in all aspects of mobiilty by LE fatigue & pain  Was able to ambualte repeated short distances, but does so with slow pace & short shuffling steps that place her at high risk for falls  Step length is also inconsistant, decreasing as she fatigues further  Seated rests offered in between to recover, but pt also required instructions for energy conservation  Pt impulsive with sitting, not waiting for instructions or brakes to be locked on recliner prior to sitting  Also demonstrates limited environmental awareness in hallway, requiring instrucions to wait for passing hospital bed before exiting room  Pt is at high risk for falls based on current mobilty status & lack of environmental awareness  Pt was able to negotiate  steps as noted above without incident, but only did so on shorter steps at this time due to concerns regarding LE mobility  Continue to recommend rehab at this time to improve functional endurance, safety awareness, strength & proficiency with all tasks to reduce risk for falls & ensrue safe return to PLOF    Barriers to Discharge: Inaccessible home environment, Decreased caregiver support  Barriers to Discharge Comments: increased ambulation, son is not expected to be home all day upon discharge     PT Discharge Recommendation: Post acute rehabilitation services     PT - OK to Discharge: Yes (to rehab when medically ready)    See flowsheet documentation for full assessment

## 2021-08-23 NOTE — PHYSICAL THERAPY NOTE
Physical Therapy Progress Note     08/23/21 1054   PT Last Visit   PT Visit Date 08/23/21   Note Type   Note Type Treatment   Pain Assessment   Pain Assessment Tool FLACC   Pain Rating: FLACC (Rest) - Face 0   Pain Rating: FLACC (Rest) - Legs 0   Pain Rating: FLACC (Rest) - Activity 0   Pain Rating: FLACC (Rest) - Cry 0   Pain Rating: FLACC (Rest) - Consolability 0   Score: FLACC (Rest) 0   Pain Rating: FLACC (Activity) - Face 1   Pain Rating: FLACC (Activity) - Legs 1   Pain Rating: FLACC (Activity) - Activity 0   Pain Rating: FLACC (Activity) - Cry 1   Pain Rating: FLACC (Activity) - Consolability 0   Score: FLACC (Activity) 3   Restrictions/Precautions   Other Precautions Pain; Fall Risk   Subjective   Subjective Pt encountered supine in bed, pleasant and agreeable to treatment  Reports she had significant trouble ambulating in home & son had to push her while she sat on her rollator to & from bathroom  Reports pain in BLE & general fatigue during session, but is motivated to participate  Bed Mobility   Supine to Sit 5  Supervision   Additional items Assist x 1;Bedrails   Transfers   Sit to Stand 4  Minimal assistance   Additional items Assist x 1; Armrests; Increased time required;Verbal cues   Stand to Sit 4  Minimal assistance   Additional items Impulsive;Assist x 1; Armrests; Increased time required;Verbal cues   Ambulation/Elevation   Gait pattern Excessively slow; Step to;Short stride; Foward flexed; Inconsistent zack;Decreased R stance;Decreased foot clearance; Antalgic; Improper Weight shift; Poor UE support   Gait Assistance 4  Minimal assist   Additional items Assist x 1   Assistive Device Rolling walker   Distance 15', 10', 20'   Stair Management Assistance 4  Minimal assist   Additional items Assist x 1   Stair Management Technique Two rails; Step to pattern; Foreward  (4 inch risers)   Number of Stairs 3   Balance   Static Sitting Fair   Static Standing Poor +   Ambulatory Poor +   Endurance Deficit Endurance Deficit Yes   Endurance Deficit Description fatigue, pain   Activity Tolerance   Activity Tolerance Patient tolerated treatment well;Patient limited by fatigue;Patient limited by pain   Nurse Made Aware MARIA Martinez   Assessment   Prognosis Fair   Problem List Decreased strength;Decreased range of motion;Decreased endurance; Impaired balance;Decreased mobility;Pain   Assessment Pt continues to be limited in all aspects of mobiilty by LE fatigue & pain  Was able to ambualte repeated short distances, but does so with slow pace & short shuffling steps that place her at high risk for falls  Step length is also inconsistant, decreasing as she fatigues further  Seated rests offered in between to recover, but pt also required instructions for energy conservation  Pt impulsive with sitting, not waiting for instructions or brakes to be locked on recliner prior to sitting  Also demonstrates limited environmental awareness in hallway, requiring instrucions to wait for passing hospital bed before exiting room  Pt is at high risk for falls based on current mobilty status & lack of environmental awareness  Pt was able to negotiate  steps as noted above without incident, but only did so on shorter steps at this time due to concerns regarding LE mobility  Continue to recommend rehab at this time to improve functional endurance, safety awareness, strength & proficiency with all tasks to reduce risk for falls & ensrue safe return to PLOF  Barriers to Discharge Inaccessible home environment;Decreased caregiver support   Barriers to Discharge Comments increased ambulation, son is not expected to be home all day upon discharge   Goals   Patient Goals to walk normally again   STG Expiration Date 08/28/21   PT Treatment Day 2   Plan   Treatment/Interventions Functional transfer training;LE strengthening/ROM; Elevations; Therapeutic exercise; Endurance training;Patient/family training;Equipment eval/education; Bed mobility;Gait training   Progress Progressing toward goals   PT Frequency Other (Comment)  (3-5x/week)   Recommendation   PT Discharge Recommendation Post acute rehabilitation services   Equipment Recommended [facility] Recommended Wheeled walker   AM-PAC Basic Mobility Inpatient   Turning in Bed Without Bedrails 4   Lying on Back to Sitting on Edge of Flat Bed 4   Moving Bed to Chair 3   Standing Up From Chair 3   Walk in Room 3   Climb 3-5 Stairs 3   Basic Mobility Inpatient Raw Score 20   Basic Mobility Standardized Score 43 99   The patient's AM-PAC Basic Mobility Inpatient Short Form Raw Score is 20, Standardized Score is 43 99  A standardized score less than 42 9 suggests the patient may benefit from discharge to post-acute rehabilitation services  Please also refer to the recommendation of the Physical Therapist for safe discharge planning            Maura Arce, PTA

## 2021-08-23 NOTE — PROGRESS NOTES
INTERNAL MEDICINE RESIDENCY PROGRESS NOTE     Name: Jihan Condon   Age & Sex: 60 y o  female   MRN: [id number]  Unit/Bed#: -01   Encounter: [id number]  Team: SOD Team A    PATIENT INFORMATION     Name: Jihan Condon   Age & Sex: 60 y o  female   MRN: [id number]  Hospital Stay Days: 3    ASSESSMENT/PLAN     Disposition: Patient here for ambulatory dysfunction and panic/anxiety disorder  Awaiting placement at LTCF and for subacute rehab  Case management following  * Ambulatory dysfunction  Assessment & Plan  Patient with decreased physical mobility, at risk for falls  She uses walker and wheelchair at home  Presented to the ED requesting placement to SNF  · Encourage good body mechanics and assist with transfers  · Keep personal items and call bell close to the patient  · PT and OT- recommends post acute rehab  · Patient is pending placement-case management consult    Generalized anxiety disorder  Assessment & Plan  Patient is on multiple psychiatric medications and was recently evaluated by inpatient psych at [facility] with adjustments to her medications including buspirone 50 mg t i d , paroxetine 60 mg daily, quetiapine 50 mg 4 times daily, mirtazapine 7 5 mg q h s , lithium 300 mg q h s  And hydroxyzine 50 mg t i d  P r n  Marco Anil Patient continues to have increased anxiety with daily morning panic attacks  Patient also reports that her anxiety is worsened by her lack of pain control  Plan:  · Psychiatry consulted- appreciate recommendations  · Continue medical management  · Continue current psychotropic medication  · She is psychiatrically cleared to go to SNF  · Confirmed psych medication regimen following discharge from [address] [place] [address] as noted above  Chronic pain syndrome  Assessment & Plan  Patient has been on chronic opioids for low back and leg pain  She was seen in the clinic and discussed opioid tapering    Most recent opioid prescription per PDMP was for morphine 15 mg for 10 days (20 tablets)  Per chart review, concern for polypharmacy and suspicion of drug-seeking  · Scheduled Tylenol  · Lidocaine patch/Voltaren gel  · Added Lyrica 100 mg b i d   --> frequency increased to t i d    · Added Oxy IR 5 mg q 12h p r n  moderate pain -->may consider changing to q8h depending on patient usage  · Added Aqua K     Mixed hyperlipidemia  Assessment & Plan  Continue atorvastatin    History of CVA (cerebrovascular accident)  Assessment & Plan  Continue aspirin and statin  Tardive dyskinesia  Assessment & Plan  -Continue Valbenazine 80 mg daily  -appears to have slightly improved with addition of Lyrica    Esophageal reflux  Assessment & Plan  Continue Protonix 20 mg daily  Chronic bilateral low back pain with bilateral sciatica-resolved as of 2021  Assessment & Plan  See pain regimen above      SUBJECTIVE     Patient seen and examined  No acute events overnight  Patient complains of continuing pain in her bilateral knees, left lateral lower extremity, and lower back  She describes the pain as a constant throbbing pain that is /10  Patient reports that her pain is uncontrolled  The patient denies any fevers, chills, chest pain, shortness of breath, nausea, vomiting, diarrhea  OBJECTIVE     Vitals:    21 2253 21 0517 21 0519 21 0631   BP: 111/64 110/64 110/64 139/87   BP Location:    Left arm   Pulse: 59 57 55 65   Resp: 16   18   Temp: 98 °F (36 7 °C)   98 3 °F (36 8 °C)   TempSrc:    Oral   SpO2: 96%  99% 97%   Weight:          Temperature:   Temp (24hrs), Av 3 °F (36 8 °C), Min:98 °F (36 7 °C), Max:98 7 °F (37 1 °C)    Temperature: 98 3 °F (36 8 °C)  Intake & Output:  I/O        07 -  0700  07 -  07 07 -  0700    P  O  1185 1260 660    Total Intake(mL/kg) 1185 (13 3) 1260 (14 2) 660 (7 4)    Urine (mL/kg/hr) 550 (0 3) 1000 (0 5) 600 (1 2)    Total Output 550 1000 600 Net +635 +260 +60           Unmeasured Urine Occurrence 2 x 2 x         Weights:        Body mass index is 36 99 kg/m²  Weight (last 2 days)     None          Physical Exam:   General:  Tardive dyskinesia  Awake, alert, and conversant  No acute distress  Eyes: Normal conjunctiva, anicteric  Round symmetric pupils  ENT: Hearing grossly intact  No nasal discharge  Neck: Neck is supple  No masses or thyromegaly  Respiratory:  Clear to auscultation bilaterally  Respirations are non-labored  No wheezing  Cardiovascular:  Regular rate rhythm  No lower extremity edema  Skin: Warm  No rashes or ulcers  MSK:  Tenderness to palpation along the left IT band  Neuro: A&O x 3  Sensation and CN II-XII grossly normal    Psych: Cooperative  Appropriate mood and affect  Normal judgement  LABORATORY DATA     Labs: I have personally reviewed pertinent reports  Results from last 7 days   Lab Units 08/23/21  0457 08/22/21  0503 08/21/21  0445   WBC Thousand/uL 4 61 3 96* 3 73*   HEMOGLOBIN g/dL 13 1 12 9 13 3   HEMATOCRIT % 39 8 40 3 41 1   PLATELETS Thousands/uL 240 251 264   NEUTROS PCT % 55 49 46   MONOS PCT % 9 9 11      Results from last 7 days   Lab Units 08/23/21  0457 08/22/21  0503 08/21/21  0445 08/18/21  0531   POTASSIUM mmol/L 3 4* 3 5 3 7 3 1*   CHLORIDE mmol/L 111* 111* 110* 110*   CO2 mmol/L 27 28 28 27   BUN mg/dL 9 12 17 8   CREATININE mg/dL 0 66 0 71 0 82 0 89   CALCIUM mg/dL 8 1* 8 3 8 4 8 8   ALK PHOS U/L  --   --   --  104   ALT U/L  --   --   --  18   AST U/L  --   --   --  12                            IMAGING & DIAGNOSTIC TESTING     Radiology Results: I have personally reviewed pertinent reports  No results found  Other Diagnostic Testing: I have personally reviewed pertinent reports      ACTIVE MEDICATIONS     Current Facility-Administered Medications   Medication Dose Route Frequency    acetaminophen (TYLENOL) tablet 650 mg  650 mg Oral Q8H [address]    amLODIPine (NORVASC) tablet 5 mg  5 mg Oral Daily    ascorbic acid (VITAMIN C) tablet 500 mg  500 mg Oral BID    aspirin chewable tablet 81 mg  81 mg Oral Daily    atorvastatin (LIPITOR) tablet 40 mg  40 mg Oral Daily With Dinner    busPIRone (BUSPAR) tablet 15 mg  15 mg Oral TID    Diclofenac Sodium (VOLTAREN) 1 % topical gel 2 g  2 g Topical 4x Daily    enoxaparin (LOVENOX) subcutaneous injection 40 mg  40 mg Subcutaneous Daily    ferrous sulfate tablet 325 mg  325 mg Oral Daily With Breakfast    folic acid (FOLVITE) tablet 1,000 mcg  1,000 mcg Oral Daily    hydrOXYzine HCL (ATARAX) tablet 50 mg  50 mg Oral TID PRN    lidocaine (LIDODERM) 5 % patch 1 patch  1 patch Topical Daily    lithium carbonate (LITHOBID) CR tablet 300 mg  300 mg Oral HS    LORazepam (ATIVAN) tablet 0 5 mg  0 5 mg Oral Q8H PRN    mirtazapine (REMERON) tablet 7 5 mg  7 5 mg Oral HS PRN    ondansetron (ZOFRAN-ODT) dispersible tablet 4 mg  4 mg Oral Q6H PRN    oxyCODONE (ROXICODONE) IR tablet 5 mg  5 mg Oral Q12H PRN    pantoprazole (PROTONIX) EC tablet 20 mg  20 mg Oral Early Morning    PARoxetine (PAXIL) tablet 60 mg  60 mg Oral Daily    polyethylene glycol (MIRALAX) packet 17 g  17 g Oral Daily    prazosin (MINIPRESS) capsule 2 mg  2 mg Oral Daily    pregabalin (LYRICA) capsule 100 mg  100 mg Oral TID    propranolol (INDERAL) tablet 20 mg  20 mg Oral BID before breakfast/lunch    QUEtiapine (SEROquel) tablet 50 mg  50 mg Oral 4x Daily (PC & HS)    senna-docusate sodium (SENOKOT S) 8 6-50 mg per tablet 1 tablet  1 tablet Oral Daily PRN    Valbenazine Tosylate CAPS 80 mg  80 mg Oral Daily       VTE Pharmacologic Prophylaxis: Enoxaparin (Lovenox)  VTE Mechanical Prophylaxis: sequential compression device    Portions of the record may have been created with voice recognition software  Occasional wrong word or "sound a like" substitutions may have occurred due to the inherent limitations of voice recognition software    Read the chart carefully and recognize, using context, where substitutions have occurred    ==  Sonam Delarosa, [facility]  Internal Medicine Residency PGY-1

## 2021-08-23 NOTE — UTILIZATION REVIEW
Inpatient Admission Authorization Request   NOTIFICATION OF INPATIENT ADMISSION/INPATIENT AUTHORIZATION REQUEST   SERVICING FACILITY:   [facility]  Address: [address] [place] [address]  Tax ID: [id number]  NPI: [id number]  Place of Service: Inpatient [address] [facility] Code: 24     ATTENDING PROVIDER:  Attending Name and NPI#: Lashell Amaral [[id number]]  Address: [address] [place] [address]  Phone: [phone]     UTILIZATION REVIEW CONTACT:  Samuel Blas Utilization   Network Utilization Review Department  Phone: [phone]  Fax: [phone]  Email: [email or web address]     PHYSICIAN ADVISORY SERVICES:  FOR PVDC-NQ-HAIK REVIEW - MEDICAL NECESSITY DENIAL  Phone: [phone]  Fax: [phone]  Email: [email or web address]     TYPE OF REQUEST:  Inpatient Status     ADMISSION INFORMATION:  ADMISSION DATE/TIME: 8/20/21  5:29 PM  PATIENT DIAGNOSIS CODE/DESCRIPTION:  Anxiety [F41 9]  Leg pain [M79 606]  Anxiety attack [F41 0]  Chronic leg pain [M79 606, G89 29]  DISCHARGE DATE/TIME: No discharge date for patient encounter  DISCHARGE DISPOSITION (IF DISCHARGED): Home/Self Care     IMPORTANT INFORMATION:  Please contact the Samuel Blas directly with any questions or concerns regarding this request  Department voicemails are confidential     Send requests for admission clinical reviews, concurrent reviews, approvals, and administrative denials due to lack of clinical to fax [phone]         Initial Clinical Review    Admission: Date/Time/Statement:   Admission Orders (From admission, onward)     Ordered        08/20/21 1729  Inpatient Admission  Once         08/18/21 0031  Place in Observation  Once                   Orders Placed This Encounter   Procedures    Place in Observation     Standing Status:   Standing     Number of Occurrences:   1     Order Specific Question:   Level of Care     Answer:   Med Surg [16]    Inpatient Admission     Standing Status:   Standing     Number of Occurrences:   1     Order Specific Question:   Level of Care     Answer:   Med Surg [16]     Order Specific Question:   Estimated length of stay     Answer:   More than 2 Midnights     Order Specific Question:   Certification     Answer:   I certify that inpatient services are medically necessary for this patient for a duration of greater than two midnights  See H&P and MD Progress Notes for additional information about the patient's course of treatment  ED Arrival Information     Expected Arrival Acuity    - 8/17/2021 22:37 Urgent         Means of arrival Escorted by Service Admission type    Walk-In Family Member General Medicine Urgent         Arrival complaint    anxiety attack,leg pain        Chief Complaint   Patient presents with    Anxiety     pt anxious and afraid to be at home by herself when her son goes back to work  pt wants to go to a rehab facility   Leg Pain     pt with hx of arthritis and in need of knee replacement c/o bilateral leg pain  Initial Presentation:   58y Female to ED presents with panic attack and SNF placement  Seen in clinic earlier today with son  Seen in ED on 8/16 for panic attack when her son wanted to leave the house without her  Son is returning to work today on 08/18 after taking leave to help take care of his mother  Pt concerned she is unable to care for self and will fall when son is not there  She uses walker and has wheelchair but unable use to do inadequate space to move around  She is also unable to self propel due to decreased strength  PMH for Extensive psychiatric history on several psychotropic medications, Chronic pain syndrome, opioid dependence, Hypertension, Hyperlipidemia, prior CVA, Tardive dyskinesia, Esophageal reflux  In ED c/o generalized pain and nausea  Admit Observation level of care for Ambulatory dysfunction  decreased physical mobility, at risk for falls    She uses walker and wheelchair at home  Requesting SNF placement  Avoid extremes of blood pressure and avoid hypoglycemia  Keep personal items and call bell close to the patient  Continue home meds  Hold off on opioids at this time due to concern of CNS depression and polypharmacy  Pain control       ED Triage Vitals   Temperature Pulse Respirations Blood Pressure SpO2   08/17/21 2257 08/17/21 2257 08/17/21 2257 08/17/21 2257 08/17/21 2257   98 4 °F (36 9 °C) 81 20 164/99 99 %      Temp Source Heart Rate Source Patient Position - Orthostatic VS BP Location FiO2 (%)   08/19/21 2347 08/18/21 0110 08/18/21 0110 08/18/21 0110 --   Oral Monitor Lying Left arm       Pain Score       08/18/21 0121       8          Wt Readings from Last 1 Encounters:   08/17/21 88 8 kg (195 lb 12 3 oz)     Additional Vital Signs:   08/18/21 08:08:54  98 3 °F (36 8 °C)  64  18  130/81  97  98 %  --  --   08/18/21 06:42:27  --  66  --  171/106  Abnormal   128  100 %  --  --   08/18/21 0524  --  60  18  145/80  --  100 %  None (Room air)       Pertinent Labs/Diagnostic Test Results:       Results from last 7 days   Lab Units 08/23/21  0457 08/22/21  0503 08/21/21  0445 08/18/21  0531   WBC Thousand/uL 4 61 3 96* 3 73* 5 16   HEMOGLOBIN g/dL 13 1 12 9 13 3 13 1   HEMATOCRIT % 39 8 40 3 41 1 39 3   PLATELETS Thousands/uL 240 251 264 257   NEUTROS ABS Thousands/µL 2 55 1 91 1 72* 3 24         Results from last 7 days   Lab Units 08/23/21  0457 08/22/21  0503 08/21/21  0445 08/20/21  0559 08/19/21  0638   SODIUM mmol/L 142 142 143 139 140   POTASSIUM mmol/L 3 4* 3 5 3 7 3 5 3 9   CHLORIDE mmol/L 111* 111* 110* 110* 112*   CO2 mmol/L 27 28 28 22 24   ANION GAP mmol/L 4 3* 5 7 4   BUN mg/dL 9 12 17 13 9   CREATININE mg/dL 0 66 0 71 0 82 0 73 0 68   EGFR ml/min/1 73sq m 113 109 92 106 112   CALCIUM mg/dL 8 1* 8 3 8 4 8 5 8 4     Results from last 7 days   Lab Units 08/18/21  0531   AST U/L 12   ALT U/L 18   ALK PHOS U/L 104   TOTAL PROTEIN g/dL 6 9 ALBUMIN g/dL 3 6   TOTAL BILIRUBIN mg/dL 1 16*         Results from last 7 days   Lab Units 08/23/21  0457 08/22/21  0503 08/21/21  0445 08/20/21  0559 08/19/21  0638 08/18/21  0531   GLUCOSE RANDOM mg/dL 88 93 88 107 109 103       Results from last 7 days   Lab Units 08/16/21  1603   D-DIMER QUANTITATIVE ug/ml FEU 0 38       ED Treatment:   Medication Administration from 08/17/2021 2237 to 08/18/2021 0602       Date/Time Order Dose Route Action     08/17/2021 2329 diazepam (VALIUM) tablet 5 mg 5 mg Oral Given     08/18/2021 0121 ketorolac (TORADOL) injection 15 mg 15 mg Intravenous Given     08/18/2021 0230 QUEtiapine (SEROquel) tablet 50 mg 50 mg Oral Given     08/18/2021 0230 acetaminophen (TYLENOL) tablet 650 mg 650 mg Oral Given     08/18/2021 0437 morphine (MS CONTIN) ER tablet 15 mg 15 mg Oral Given        Past Medical History:   Diagnosis Date    Acid reflux     Anxiety     RESOLVED: [id number]    Arthritis     Bipolar 2 disorder (HCC)     FOLLOWS WITH PSYCHIATRIST  CONTINUE LAMOTRIGINE; RESOLVED: [id number]    Depression     Familial tremor     both hands    Fibromyalgia     LAST ASSESSED: [id number]    Hearing aid worn     left ear    [place] (hard of hearing)     left ear    Hypertension     Left-sided weakness     Lower back pain     Memory loss of unknown cause     long and short term    Migraine     Obesity     Obesity, Class II, BMI 35-39 9     Overactive bladder     Panic attack     Post traumatic stress disorder     Seasonal allergies     Stroke [facility])     questionable stroke 2009    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    CONT ASA 81 MG DAILY; RESOLVED: [id number]    Urinary incontinence     Wears dentures     partial lower / full upper    Wears glasses      Present on Admission:   Ambulatory dysfunction   (Resolved) Chronic bilateral low back pain with bilateral sciatica   Generalized anxiety disorder   Tardive dyskinesia   Mixed hyperlipidemia   Chronic pain syndrome   Esophageal reflux      Admitting Diagnosis: Anxiety [F41 9]  Leg pain [M79 606]  Anxiety attack [F41 0]  Chronic leg pain [M79 606, G89 29]  Age/Sex: 60 y o  female     Admission Orders:  Scheduled Medications:  acetaminophen, 650 mg, Oral, Q8H [address]  amLODIPine, 5 mg, Oral, Daily  ascorbic acid, 500 mg, Oral, BID  aspirin, 81 mg, Oral, Daily  atorvastatin, 40 mg, Oral, Daily With Dinner  busPIRone, 15 mg, Oral, TID  Diclofenac Sodium, 2 g, Topical, 4x Daily  enoxaparin, 40 mg, Subcutaneous, Daily  ferrous sulfate, 325 mg, Oral, Daily With Breakfast  folic acid, 5,682 mcg, Oral, Daily  lidocaine, 1 patch, Topical, Daily  lithium carbonate, 300 mg, Oral, HS  pantoprazole, 20 mg, Oral, Early Morning  PARoxetine, 60 mg, Oral, Daily  polyethylene glycol, 17 g, Oral, Daily  prazosin, 2 mg, Oral, Daily  pregabalin, 100 mg, Oral, BID  propranolol, 20 mg, Oral, BID before breakfast/lunch  QUEtiapine, 50 mg, Oral, 4x Daily (PC & HS)  Valbenazine Tosylate, 80 mg, Oral, Daily      Continuous IV Infusions:     PRN Meds:  hydrOXYzine HCL, 50 mg, Oral, TID PRN  LORazepam, 0 5 mg, Oral, Q8H PRN  mirtazapine, 7 5 mg, Oral, HS PRN  ondansetron, 4 mg, Oral, Q6H PRN  oxyCODONE, 5 mg, Oral, Q12H PRN  senna-docusate sodium, 1 tablet, Oral, Daily PRN        IP CONSULT TO CASE MANAGEMENT  IP CONSULT TO PSYCHIATRY    Network Utilization Review Department  ATTENTION: Please call with any questions or concerns to [phone] and carefully listen to the prompts so that you are directed to the right person  All voicemails are confidential   Digna Guzman all requests for admission clinical reviews, approved or denied determinations and any other requests to dedicated fax number below belonging to the campus where the patient is receiving treatment   List of dedicated fax numbers for the Facilities:  [address] [place] [address] DENIALS (Administrative/Medical Necessity) [phone]   [address] [place] [address] (Maternity/NICU/Pediatrics) 261 [facility],[address]Th Floor [address] [place] [address] [id number] [facility] [place] [address] Lorenza Deutsch [place] [address] [facility] [phone]       Continued Stay Review    Date: 8/19                        Current Patient Class: Observation   Current Level of Care: Med Surg    HPI:55 y o  female initially admitted on     Assessment/Plan:   Ambulatory dysfunction  Assessment & Plan  Patient with decreased physical mobility, at risk for falls  She uses walker and wheelchair at home  Presented to the ED requesting placement to SNF  · Encourage good body mechanics and assist with transfers  · Keep personal items and call bell close to the patient  · PT and OT- recommends post acute rehab  ·  and Psychiatry consulted for placement    Generalized anxiety disorder  Assessment & Plan  Patient is on multiple psychiatric medications and was recently evaluated by inpatient psych at [facility] with adjustments to her medications including buspirone 50 mg t i d , paroxetine 60 mg daily, quetiapine 50 mg 4 times daily, mirtazapine 7 5 mg q h s , lithium 300 mg q h s  And hydroxyzine 50 mg t i d  P r n  Norma Cowan Patient continues to have increased anxiety with daily morning panic attacks    Patient also reports that her anxiety is worsened by her lack of pain control      Plan:  · Psychiatry consulted- appreciate recommendations  · Confirmed psych medication regimen following discharge from [address] [place] [address] as noted above      Chronic pain syndrome  Assessment & Plan  Patient has been on chronic opioids for low back and leg pain  She was seen in the clinic and discussed opioid tapering  Most recent opioid prescription per PDMP was for morphine 15 mg for 10 days (20 tablets)  Per chart review, concern for polypharmacy and suspicion of drug-seeking  · Scheduled Tylenol  · Lidocaine patch/Voltaren gel  · Toradol as needed for severe pain   · Added Lyrica 100 mg b i d  8/19  · Added Oxy IR 5 mg q 12h p r n  moderate pain    Awaiting placement  Per CM notes; received fax from [facility], a determination letter from May, 2021  Pt has had an inpatient psych stay since that time which changes the PASRR  CM called  [facility] and left a voicemail, awaiting return call to discuss  Received call back from [place] with [facility] who confirmed that the options process must be completed again, previous LOD is ineligible at this time  Psych consult faxed to [place]  Await determination later      Vital Signs:   08/19/21 12:44:19  --  64  --  148/96  113  99 %  --  --   08/19/21 1243  --  60  --  148/96  --  --  --  --   08/19/21 08:01:30  98 6 °F (37 °C)  57  18  151/98  116  96 %         Pertinent Labs/Diagnostic Results:       Results from last 7 days   Lab Units 08/23/21  0457 08/22/21  0503 08/21/21  0445 08/18/21  0531   WBC Thousand/uL 4 61 3 96* 3 73* 5 16   HEMOGLOBIN g/dL 13 1 12 9 13 3 13 1   HEMATOCRIT % 39 8 40 3 41 1 39 3   PLATELETS Thousands/uL 240 251 264 257   NEUTROS ABS Thousands/µL 2 55 1 91 1 72* 3 24         Results from last 7 days   Lab Units 08/23/21  0457 08/22/21  0503 08/21/21  0445 08/20/21  0559 08/19/21  0638   SODIUM mmol/L 142 142 143 139 140   POTASSIUM mmol/L 3 4* 3 5 3 7 3 5 3 9   CHLORIDE mmol/L 111* 111* 110* 110* 112*   CO2 mmol/L 27 28 28 22 24   ANION GAP mmol/L 4 3* 5 7 4   BUN mg/dL 9 12 17 13 9   CREATININE mg/dL 0 66 0 71 0 82 0 73 0 68   EGFR ml/min/1 73sq m 113 109 92 106 112   CALCIUM mg/dL 8 1* 8 3 8 4 8 5 8 4     Results from last 7 days   Lab Units 08/18/21  0531   AST U/L 12   ALT U/L 18   ALK PHOS U/L 104   TOTAL PROTEIN g/dL 6 9   ALBUMIN g/dL 3 6   TOTAL BILIRUBIN mg/dL 1 16*         Results from last 7 days   Lab Units 08/23/21  0457 08/22/21  0503 08/21/21  0445 08/20/21  0559 08/19/21  3139 08/18/21  0531   GLUCOSE RANDOM mg/dL 88 93 88 107 109 103       Results from last 7 days   Lab Units 08/16/21  1603   D-DIMER QUANTITATIVE ug/ml FEU 0 38       Medications:   Scheduled Medications:  acetaminophen, 650 mg, Oral, Q8H KESHIA  amLODIPine, 5 mg, Oral, Daily  ascorbic acid, 500 mg, Oral, BID  aspirin, 81 mg, Oral, Daily  atorvastatin, 40 mg, Oral, Daily With Dinner  busPIRone, 15 mg, Oral, TID  Diclofenac Sodium, 2 g, Topical, 4x Daily  enoxaparin, 40 mg, Subcutaneous, Daily  ferrous sulfate, 325 mg, Oral, Daily With Breakfast  folic acid, 9,876 mcg, Oral, Daily  lidocaine, 1 patch, Topical, Daily  lithium carbonate, 300 mg, Oral, HS  pantoprazole, 20 mg, Oral, Early Morning  PARoxetine, 60 mg, Oral, Daily  polyethylene glycol, 17 g, Oral, Daily  prazosin, 2 mg, Oral, Daily  pregabalin, 100 mg, Oral, BID  propranolol, 20 mg, Oral, BID before breakfast/lunch  QUEtiapine, 50 mg, Oral, 4x Daily (PC & HS)  Valbenazine Tosylate, 80 mg, Oral, Daily      Continuous IV Infusions:     PRN Meds:  hydrOXYzine HCL, 50 mg, Oral, TID PRN  LORazepam, 0 5 mg, Oral, Q8H PRN  mirtazapine, 7 5 mg, Oral, HS PRN  ondansetron, 4 mg, Oral, Q6H PRN  oxyCODONE, 5 mg, Oral, Q12H PRN  senna-docusate sodium, 1 tablet, Oral, Daily PRN      Discharge Plan: See above    Network Utilization Review Department  ATTENTION: Please call with any questions or concerns to [phone] and carefully listen to the prompts so that you are directed to the right person   All voicemails are confidential   Nathalie Query all requests for admission clinical reviews, approved or denied determinations and any other requests to dedicated fax number below belonging to the campus where the patient is receiving treatment  List of dedicated fax numbers for the Facilities:  [address] [place] [address] DENIALS (Administrative/Medical Necessity) [phone]   [address] (Maternity/NICU/Pediatrics) [phone]   [address] [place] [address] [address] [place] [address] Lorenza Deutsch [place] [address] [phone]     Continued Stay Review    Date: 8/20                 Current Patient Class: Observation  Current Level of Care: Med Surg    HPI:55 y o  female initially admitted on 8/18    Assessment/Plan:   Await IP rehab placement    Per  notes; [facility] to accept  [facility], [facility] will [facility] next week for bed availability  Referrals expanded to a 40 mile radius       Vital Signs:   08/20/21 15:38:37  98 3 °F (36 8 °C)  62  16  104/61  75  92 %  --  --   08/20/21 12:05:21  --  64  --  129/80  96  98 %  --  --   08/20/21 0756  --  --  --  --  --  99 %  None (Room air)       Pertinent Labs/Diagnostic Results:       Results from last 7 days   Lab Units 08/23/21  0457 08/22/21  0503 08/21/21  0445 08/18/21  0531   WBC Thousand/uL 4 61 3 96* 3 73* 5 16   HEMOGLOBIN g/dL 13 1 12 9 13 3 13 1   HEMATOCRIT % 39 8 40 3 41 1 39 3   PLATELETS Thousands/uL 240 251 264 257   NEUTROS ABS Thousands/µL 2 55 1 91 1 72* 3 24         Results from last 7 days   Lab Units 08/23/21  0457 08/22/21  0503 08/21/21  0445 08/20/21  0559 08/19/21  7387   SODIUM mmol/L 142 142 143 139 140   POTASSIUM mmol/L 3 4* 3 5 3 7 3 5 3 9   CHLORIDE mmol/L 111* 111* 110* 110* 112*   CO2 mmol/L 27 28 28 22 24   ANION GAP mmol/L 4 3* 5 7 4   BUN mg/dL 9 12 17 13 9   CREATININE mg/dL 0 66 0 71 0 82 0 73 0 68   EGFR ml/min/1 73sq m 113 109 92 106 112   CALCIUM mg/dL 8 1* 8 3 8 4 8 5 8 4     Results from last 7 days   Lab Units 08/18/21  0531   AST U/L 12   ALT U/L 18   ALK PHOS U/L 104   TOTAL PROTEIN g/dL 6 9   ALBUMIN g/dL 3 6   TOTAL BILIRUBIN mg/dL 1 16*         Results from last 7 days   Lab Units 08/23/21  0457 08/22/21  0503 08/21/21  0445 08/20/21  0559 08/19/21  0638 08/18/21  0531   GLUCOSE RANDOM mg/dL 88 93 88 107 109 103       Results from last 7 days   Lab Units 08/16/21  1603   D-DIMER QUANTITATIVE ug/ml FEU 0 38       Medications:   Scheduled Medications:  acetaminophen, 650 mg, Oral, Q8H KESHIA  amLODIPine, 5 mg, Oral, Daily  ascorbic acid, 500 mg, Oral, BID  aspirin, 81 mg, Oral, Daily  atorvastatin, 40 mg, Oral, Daily With Dinner  busPIRone, 15 mg, Oral, TID  Diclofenac Sodium, 2 g, Topical, 4x Daily  enoxaparin, 40 mg, Subcutaneous, Daily  ferrous sulfate, 325 mg, Oral, Daily With Breakfast  folic acid, 7,536 mcg, Oral, Daily  lidocaine, 1 patch, Topical, Daily  lithium carbonate, 300 mg, Oral, HS  pantoprazole, 20 mg, Oral, Early Morning  PARoxetine, 60 mg, Oral, Daily  polyethylene glycol, 17 g, Oral, Daily  prazosin, 2 mg, Oral, Daily  pregabalin, 100 mg, Oral, BID  propranolol, 20 mg, Oral, BID before breakfast/lunch  QUEtiapine, 50 mg, Oral, 4x Daily (PC & HS)  Valbenazine Tosylate, 80 mg, Oral, Daily      Continuous IV Infusions: None     PRN Meds:  hydrOXYzine HCL, 50 mg, Oral, TID PRN  LORazepam, 0 5 mg, Oral, Q8H PRN  mirtazapine, 7 5 mg, Oral, HS PRN  ondansetron, 4 mg, Oral, Q6H PRN  oxyCODONE, 5 mg, Oral, Q12H PRN  senna-docusate sodium, 1 tablet, Oral, Daily PRN        Discharge Plan: Awaiting IP placement - see above    Network Utilization Review Department  ATTENTION: Please call with any questions or concerns to [phone] and carefully listen to the prompts so that you are directed to the right person  All voicemails are confidential   Stone Pitts all requests for admission clinical reviews, approved or denied determinations and any other requests to dedicated fax number below belonging to the campus where the patient is receiving treatment   List of dedicated fax numbers for the Facilities:  [address] DENIALS (Administrative/Medical Necessity) [phone]   [address] (Maternity/NICU/Pediatrics) [phone]   [address] [address] [facility] [address] [address] [place] [address] Lorenza [address] [facility] [place] [address] [phone]     Initial Clinical Review    OBS 8/18 @ 0031 UPGRADED TO INPATIENT 8/20 @ [address] D/O, CHRONIC PAIN MANAGEMENT AND NEED FOR SAFE D/C PLAN    Admission: Date/Time/Statement:   Admission Orders (From admission, onward)     Ordered        08/20/21 1729  Inpatient Admission  Once         08/18/21 0031  Place in Observation  Once                   Orders Placed This Encounter   Procedures    Inpatient Admission     Standing Status:   Standing     Number of Occurrences:   1     Order Specific Question:   Level of Care     Answer:   Med Surg [16]     Order Specific Question:   Estimated length of stay     Answer:   More than 2 Midnights     Order Specific Question:   Certification     Answer:   I certify that inpatient services are medically necessary for this patient for a duration of greater than two midnights  See H&P and MD Progress Notes for additional information about the patient's course of treatment  ED Arrival Information     Expected Arrival Acuity    - 8/17/2021 22:37 Urgent         Means of arrival Escorted by Service Admission type    Walk-In Family Member General Medicine Urgent         Arrival complaint    anxiety attack,leg pain        Chief Complaint   Patient presents with    Anxiety     pt anxious and afraid to be at home by herself when her son goes back to work  pt wants to go to a rehab facility   Leg Pain     pt with hx of arthritis and in need of knee replacement c/o bilateral leg pain  Initial Presentation: 59 y/o female to ED with panic attack and SNF placement  Seen in clinic earlier today with son  Seen in ED on 8/16 for panic attack when her son wanted to leave the house without her  Karina Rung is returning to work today on 08/18 after taking leave to help take care of his mother  Pt concerned she is unable to care for self and will fall when son is not there  She uses walker and has wheelchair but unable to use d/t inadequate space to move around  She is also unable to self propel d/t decreased strength  PMH for Extensive psychiatric history on several psychotropic medications, Chronic pain syndrome, opioid dependence, HTN, HLD, prior CVA, Tardive dyskinesia, Esophageal reflux  In ED c/o generalized pain and nausea  Admit Observation level of care for Ambulatory dysfunction, decreased physical mobility, at risk for falls   She uses walker and wheelchair at home  Requesting SNF placement  Avoid extremes of BP and avoid hypoglycemia  Keep personal items and call bell close to the patient  Continue home meds  Hold off on opioids at this time due to concern of CNS depression and polypharmacy  Pain control  8/19 -- continue current po meds   PT/OT evals recommending IP post acute rehab   and Psychiatry consulted for placement  Continue scheduled tylenol, lidocaine, added lyrica and prn oxy  Per  notes; received fax from [facility], a determination letter from May, 2021  Pt has had an inpatient psych stay since that time which changes the PASRR  CM called [facility] and left a voicemail, awaiting return call to discuss  Received call back from [place] with [facility] who confirmed that the options process must be completed again, previous LOD is ineligible at this time  Psych consult faxed to [place]  Await determination later    8/20 -- Await IP rehab placement -- upgraded to inpatient admisison     Per  notes; [facility] unable to accept  [facility], [facility]  Referrals expanded to a 40 mile radius  8/21 -- Pt with some anxiety this AM, reports she felt shaky  Patient reports that her pain is well controlled on her new pain regimen  Patient reports she is moving her bowel bladder with no issue  Psychiatry consulted, Confirmed psych medication regimen following d/c from [address] [place] [address] psych meds  Continue pain control, Aqua K pad added  Awaiting IP rehab placement          ED Triage Vitals   Temperature Pulse Respirations Blood Pressure SpO2   08/17/21 2257 08/17/21 2257 08/17/21 2257 08/17/21 2257 08/17/21 2257   98 4 °F (36 9 °C) 81 20 164/99 99 %      Temp Source Heart Rate Source Patient Position - Orthostatic VS BP Location FiO2 (%)   08/19/21 2347 08/18/21 0110 08/18/21 0110 08/18/21 0110 --   Oral Monitor Lying Left arm       Pain Score       08/18/21 0121       8          Wt Readings from Last 1 Encounters:   08/17/21 88 8 kg (195 lb 12 3 oz)     Additional Vital Signs:   Date/Time  Temp  Pulse  Resp  BP  MAP (mmHg)  SpO2  O2 Device   08/21/21 0900  --  --  --  --  --  --  None (Room air)   08/21/21 07:13:15  97 5 °F (36 4 °C)  53Abnormal   18  110/68  82  98 %  --   08/20/21 0756  --  -- --  --  --  99 %  None (Room air)   08/20/21 07:32:05  97 6 °F (36 4 °C)  59  20  131/81  98  99 %  --   08/19/21 23:47:23  98 2 °F (36 8 °C)  66  16  134/86  102  96 %  None (Room air)   08/19/21 12:44:19  --  64  --  148/96  113  99 %  --   08/19/21 1243  --  60  --  148/96  --  --  --   08/19/21 06:20:56  --  56  --  140/82  101  99 %  --       Pertinent Labs/Diagnostic Test Results:     Results from last 7 days   Lab Units 08/23/21  0457 08/22/21  0503 08/21/21  0445 08/18/21  0531   WBC Thousand/uL 4 61 3 96* 3 73* 5 16   HEMOGLOBIN g/dL 13 1 12 9 13 3 13 1   HEMATOCRIT % 39 8 40 3 41 1 39 3   PLATELETS Thousands/uL 240 251 264 257   NEUTROS ABS Thousands/µL 2 55 1 91 1 72* 3 24     Results from last 7 days   Lab Units 08/23/21  0457 08/22/21  0503 08/21/21  0445 08/20/21  0559 08/19/21  0638   SODIUM mmol/L 142 142 143 139 140   POTASSIUM mmol/L 3 4* 3 5 3 7 3 5 3 9   CHLORIDE mmol/L 111* 111* 110* 110* 112*   CO2 mmol/L 27 28 28 22 24   ANION GAP mmol/L 4 3* 5 7 4   BUN mg/dL 9 12 17 13 9   CREATININE mg/dL 0 66 0 71 0 82 0 73 0 68   EGFR ml/min/1 73sq m 113 109 92 106 112   CALCIUM mg/dL 8 1* 8 3 8 4 8 5 8 4     Results from last 7 days   Lab Units 08/18/21  0531   AST U/L 12   ALT U/L 18   ALK PHOS U/L 104   TOTAL PROTEIN g/dL 6 9   ALBUMIN g/dL 3 6   TOTAL BILIRUBIN mg/dL 1 16*       Results from last 7 days   Lab Units 08/23/21  0457 08/22/21  0503 08/21/21  0445 08/20/21  0559 08/19/21  0638 08/18/21  0531   GLUCOSE RANDOM mg/dL 88 93 88 107 109 103     Results from last 7 days   Lab Units 08/16/21  1603   D-DIMER QUANTITATIVE ug/ml FEU 0 38       ED Treatment:   Medication Administration from 08/17/2021 2237 to 08/18/2021 0602       Date/Time Order Dose Route Action     08/17/2021 2329 diazepam (VALIUM) tablet 5 mg 5 mg Oral Given     08/18/2021 0121 ketorolac (TORADOL) injection 15 mg 15 mg Intravenous Given     08/18/2021 0230 QUEtiapine (SEROquel) tablet 50 mg 50 mg Oral Given     08/18/2021 0230 acetaminophen (TYLENOL) tablet 650 mg 650 mg Oral Given     08/18/2021 0437 morphine (MS CONTIN) ER tablet 15 mg 15 mg Oral Given     Past Medical History:   Diagnosis Date    Acid reflux     Anxiety     RESOLVED: [id number]    Arthritis     Bipolar 2 disorder (HCC)     FOLLOWS WITH PSYCHIATRIST  CONTINUE LAMOTRIGINE; RESOLVED: [id number]    Depression     Familial tremor     both hands    Fibromyalgia     LAST ASSESSED: [id number]    Hearing aid worn     left ear    [place] (hard of hearing)     left ear    Hypertension     Left-sided weakness     Lower back pain     Memory loss of unknown cause     long and short term    Migraine     Obesity     Obesity, Class II, BMI 35-39 9     Overactive bladder     Panic attack     Post traumatic stress disorder     Seasonal allergies     Stroke [facility])     questionable stroke 2009    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  CONT ASA 81 MG DAILY; RESOLVED: [id number]    Urinary incontinence     Wears dentures     partial lower / full upper    Wears glasses      Present on Admission:   Ambulatory dysfunction   (Resolved) Chronic bilateral low back pain with bilateral sciatica   Generalized anxiety disorder   Tardive dyskinesia   Mixed hyperlipidemia   Chronic pain syndrome   Esophageal reflux      Admitting Diagnosis:  Anxiety [F41 9]  Leg pain [M79 606]  Anxiety attack [F41 0]  Chronic leg pain [M79 606, G89 29]  Age/Sex: 60 y o  female  Admission Orders:  Scheduled Medications:  acetaminophen, 650 mg, Oral, Q8H [facility] & half-way  amLODIPine, 5 mg, Oral, Daily  ascorbic acid, 500 mg, Oral, BID  aspirin, 81 mg, Oral, Daily  atorvastatin, 40 mg, Oral, Daily With Dinner  busPIRone, 15 mg, Oral, TID  Diclofenac Sodium, 2 g, Topical, 4x Daily  enoxaparin, 40 mg, Subcutaneous, Daily  ferrous sulfate, 325 mg, Oral, Daily With Breakfast  folic acid, 0,208 mcg, Oral, Daily  lidocaine, 1 patch, Topical, Daily  lithium carbonate, 300 mg, Oral, HS  pantoprazole, 20 mg, Oral, Early Morning  PARoxetine, 60 mg, Oral, Daily  polyethylene glycol, 17 g, Oral, Daily  prazosin, 2 mg, Oral, Daily  pregabalin, 100 mg, Oral, BID  propranolol, 20 mg, Oral, BID before breakfast/lunch  QUEtiapine, 50 mg, Oral, 4x Daily (PC & HS)  Valbenazine Tosylate, 80 mg, Oral, Daily         PRN Meds:  Ketorolac 15 mg IV PRN 8/18 x4, 8/19 x3 then d/c'd  hydrOXYzine HCL, 50 mg, Oral, TID PRN  LORazepam, 0 5 mg, Oral, Q8H PRN 8/18 x1, 8/19 x2, 8/20 x1  mirtazapine, 7 5 mg, Oral, HS PRN 8/18 x1, 8/20 x1  ondansetron, 4 mg, Oral, Q6H PRN  oxyCODONE, 5 mg, Oral, Q12H PRN 8/19 x1, 8/20 x2, 8/21 x1  senna-docusate sodium, 1 tablet, Oral, Daily PRN        IP CONSULT TO CASE MANAGEMENT  IP CONSULT TO PSYCHIATRY    Network Utilization Review Department  ATTENTION: Please call with any questions or concerns to [phone] and carefully listen to the prompts so that you are directed to the right person  All voicemails are confidential   Pepe Lai all requests for admission clinical reviews, approved or denied determinations and any other requests to dedicated fax number below belonging to the campus where the patient is receiving treatment   List of dedicated fax numbers for the Facilities:  [address] DENIALS (Administrative/Medical Necessity) [phone]   [address] (Maternity/NICU/Pediatrics) [phone]   [address] [place] [address] [address] [place] [address] [address] [place] [address] [address] [place] [address] [phone]

## 2021-08-24 LAB
ANION GAP SERPL CALCULATED.3IONS-SCNC: 4 MMOL/L (ref 4–13)
BUN SERPL-MCNC: 8 MG/DL (ref 5–25)
CALCIUM SERPL-MCNC: 8 MG/DL (ref 8.3–10.1)
CHLORIDE SERPL-SCNC: 109 MMOL/L (ref 100–108)
CO2 SERPL-SCNC: 30 MMOL/L (ref 21–32)
CREAT SERPL-MCNC: 0.7 MG/DL (ref 0.6–1.3)
GFR SERPL CREATININE-BSD FRML MDRD: 111 ML/MIN/1.73SQ M
GLUCOSE SERPL-MCNC: 92 MG/DL (ref 65–140)
POTASSIUM SERPL-SCNC: 3.4 MMOL/L (ref 3.5–5.3)
SODIUM SERPL-SCNC: 143 MMOL/L (ref 136–145)

## 2021-08-24 PROCEDURE — 80048 BASIC METABOLIC PNL TOTAL CA: CPT | Performed by: STUDENT IN AN ORGANIZED HEALTH CARE EDUCATION/TRAINING PROGRAM

## 2021-08-24 PROCEDURE — NC001 PR NO CHARGE: Performed by: INTERNAL MEDICINE

## 2021-08-24 PROCEDURE — 99232 SBSQ HOSP IP/OBS MODERATE 35: CPT | Performed by: INTERNAL MEDICINE

## 2021-08-24 PROCEDURE — 97535 SELF CARE MNGMENT TRAINING: CPT

## 2021-08-24 RX ORDER — LORAZEPAM 0.5 MG/1
0.5 TABLET ORAL ONCE
Status: COMPLETED | OUTPATIENT
Start: 2021-08-24 | End: 2021-08-24

## 2021-08-24 RX ORDER — POTASSIUM CHLORIDE 20 MEQ/1
40 TABLET, EXTENDED RELEASE ORAL ONCE
Status: COMPLETED | OUTPATIENT
Start: 2021-08-24 | End: 2021-08-24

## 2021-08-24 RX ORDER — OXYCODONE HYDROCHLORIDE 5 MG/1
2.5 TABLET ORAL ONCE
Status: COMPLETED | OUTPATIENT
Start: 2021-08-24 | End: 2021-08-24

## 2021-08-24 RX ADMIN — QUETIAPINE FUMARATE 50 MG: 25 TABLET ORAL at 21:15

## 2021-08-24 RX ADMIN — LITHIUM CARBONATE 300 MG: 300 TABLET, FILM COATED, EXTENDED RELEASE ORAL at 21:17

## 2021-08-24 RX ADMIN — PROPRANOLOL HYDROCHLORIDE 20 MG: 20 TABLET ORAL at 17:58

## 2021-08-24 RX ADMIN — OXYCODONE HYDROCHLORIDE 2.5 MG: 5 TABLET ORAL at 18:33

## 2021-08-24 RX ADMIN — QUETIAPINE FUMARATE 50 MG: 25 TABLET ORAL at 09:23

## 2021-08-24 RX ADMIN — ASPIRIN 81 MG CHEWABLE TABLET 81 MG: 81 TABLET CHEWABLE at 09:21

## 2021-08-24 RX ADMIN — LIDOCAINE 1 PATCH: 50 PATCH TOPICAL at 09:20

## 2021-08-24 RX ADMIN — BUSPIRONE HYDROCHLORIDE 15 MG: 10 TABLET ORAL at 21:16

## 2021-08-24 RX ADMIN — DICLOFENAC SODIUM 2 G: 10 GEL TOPICAL at 17:57

## 2021-08-24 RX ADMIN — OXYCODONE HYDROCHLORIDE AND ACETAMINOPHEN 500 MG: 500 TABLET ORAL at 17:57

## 2021-08-24 RX ADMIN — PREGABALIN 100 MG: 100 CAPSULE ORAL at 17:57

## 2021-08-24 RX ADMIN — ENOXAPARIN SODIUM 40 MG: 40 INJECTION SUBCUTANEOUS at 09:21

## 2021-08-24 RX ADMIN — OXYCODONE HYDROCHLORIDE AND ACETAMINOPHEN 500 MG: 500 TABLET ORAL at 09:25

## 2021-08-24 RX ADMIN — DICLOFENAC SODIUM 2 G: 10 GEL TOPICAL at 09:27

## 2021-08-24 RX ADMIN — QUETIAPINE FUMARATE 50 MG: 25 TABLET ORAL at 13:08

## 2021-08-24 RX ADMIN — QUETIAPINE FUMARATE 50 MG: 25 TABLET ORAL at 17:57

## 2021-08-24 RX ADMIN — OXYCODONE HYDROCHLORIDE 5 MG: 5 TABLET ORAL at 09:24

## 2021-08-24 RX ADMIN — ACETAMINOPHEN 650 MG: 325 TABLET, FILM COATED ORAL at 05:41

## 2021-08-24 RX ADMIN — PRAZOSIN HYDROCHLORIDE 2 MG: 2 CAPSULE ORAL at 09:31

## 2021-08-24 RX ADMIN — PROPRANOLOL HYDROCHLORIDE 20 MG: 20 TABLET ORAL at 09:28

## 2021-08-24 RX ADMIN — PREGABALIN 100 MG: 100 CAPSULE ORAL at 09:23

## 2021-08-24 RX ADMIN — BUSPIRONE HYDROCHLORIDE 15 MG: 10 TABLET ORAL at 09:24

## 2021-08-24 RX ADMIN — BUSPIRONE HYDROCHLORIDE 15 MG: 10 TABLET ORAL at 17:57

## 2021-08-24 RX ADMIN — ACETAMINOPHEN 650 MG: 325 TABLET, FILM COATED ORAL at 21:16

## 2021-08-24 RX ADMIN — DICLOFENAC SODIUM 2 G: 10 GEL TOPICAL at 21:15

## 2021-08-24 RX ADMIN — POTASSIUM CHLORIDE 40 MEQ: 1500 TABLET, EXTENDED RELEASE ORAL at 09:24

## 2021-08-24 RX ADMIN — PANTOPRAZOLE SODIUM 20 MG: 20 TABLET, DELAYED RELEASE ORAL at 05:41

## 2021-08-24 RX ADMIN — ATORVASTATIN CALCIUM 40 MG: 40 TABLET, FILM COATED ORAL at 17:57

## 2021-08-24 RX ADMIN — LORAZEPAM 0.5 MG: 0.5 TABLET ORAL at 21:15

## 2021-08-24 RX ADMIN — DICLOFENAC SODIUM 2 G: 10 GEL TOPICAL at 12:45

## 2021-08-24 RX ADMIN — PREGABALIN 100 MG: 100 CAPSULE ORAL at 21:16

## 2021-08-24 RX ADMIN — VALBENAZINE 80 MG: 80 CAPSULE ORAL at 09:29

## 2021-08-24 RX ADMIN — ACETAMINOPHEN 650 MG: 325 TABLET, FILM COATED ORAL at 13:08

## 2021-08-24 RX ADMIN — MIRTAZAPINE 7.5 MG: 15 TABLET, FILM COATED ORAL at 22:52

## 2021-08-24 RX ADMIN — AMLODIPINE BESYLATE 5 MG: 5 TABLET ORAL at 09:23

## 2021-08-24 RX ADMIN — PAROXETINE HYDROCHLORIDE 60 MG: 20 TABLET, FILM COATED ORAL at 21:17

## 2021-08-24 RX ADMIN — FOLIC ACID 1000 MCG: 1 TABLET ORAL at 09:44

## 2021-08-24 RX ADMIN — FERROUS SULFATE TAB 325 MG (65 MG ELEMENTAL FE) 325 MG: 325 (65 FE) TAB at 09:25

## 2021-08-24 RX ADMIN — POLYETHYLENE GLYCOL 3350 17 G: 17 POWDER, FOR SOLUTION ORAL at 09:26

## 2021-08-24 RX ADMIN — LORAZEPAM 0.5 MG: 0.5 TABLET ORAL at 19:42

## 2021-08-24 NOTE — OCCUPATIONAL THERAPY NOTE
Occupational Therapy Progress Note     Patient Name: Ananth Dang  GQONH'T Date: 8/24/2021  Problem List  Principal Problem:    Ambulatory dysfunction  Active Problems:    Chronic pain syndrome    Esophageal reflux    Generalized anxiety disorder    Tardive dyskinesia    History of CVA (cerebrovascular accident)    Mixed hyperlipidemia          08/24/21 1115   OT Last Visit   OT Visit Date 08/24/21   Note Type   Note Type Treatment   Restrictions/Precautions   Weight Bearing Precautions Per Order No   Other Precautions Fall Risk;Fluid restriction   General   Response to Previous Treatment Patient with no complaints from previous session   Lifestyle   Autonomy pta pt reports son assists w/ ADLs/IADLs  pt reports using rollator for functional mobility PTA   Reciprocal Relationships supportive son- reports son is going back to work shortly and she will be home alone   Service to Others not currentlying working   Intrinsic Gratification enjoys watching tv   Pain Assessment   Pain Assessment Tool 0-10   Pain Score 7   Pain Location/Orientation Orientation: Bilateral;Location: Leg  (thighs)   Hospital Pain Intervention(s) Repositioned; Ambulation/increased activity   ADL   Where Assessed Chair   Grooming Assistance 5  Supervision/Setup   UB Bathing Assistance 5  Supervision/Setup   LB Bathing Assistance 3  Moderate Assistance   LB Bathing Deficit Buttocks;Right lower leg including foot   UB Dressing Assistance 5  Supervision/Setup   LB Dressing Assistance 2  Maximal Assistance   LB Dressing Deficit Don/doff R sock; Don/doff L sock; Thread RLE into underwear; Thread LLE into underwear;Pull up over hips   Toileting Assistance  4  Minimal Assistance   Toileting Deficit Clothing management up   Bed Mobility   Supine to Sit 6  Modified independent   Additional items Bedrails   Sit to Supine Unable to assess   Transfers   Sit to Stand 5  Supervision   Additional items Assist x 1; Increased time required   Stand to Sit 5 Supervision   Additional items Assist x 1; Increased time required   Toilet transfer 5  Supervision   Additional items Assist x 1; Increased time required  (grab bars)   Additional Comments Transfers with rollator   Functional Mobility   Functional Mobility 5  Supervision   Additional Comments [facility]   Additional items   (rollator)   Cognition   Overall Cognitive Status WFL   Arousal/Participation Responsive; Alert; Cooperative   Attention Within functional limits   Memory Within functional limits   Following Commands Follows one step commands without difficulty   Comments Pt pleasant and cooperative t/o session    Activity Tolerance   Activity Tolerance Patient tolerated treatment well   Medical Staff Made Aware RN clearance for session    Assessment   Assessment Patient participated in Skilled OT session this date with interventions consisting of ADL re training with the use of correct body mechnaics,  therapeutic activities to: increase activity tolerance and increase OOB/ sitting tolerance   Upon arrival patient was found supine in bed  Pt demonstrated the following tasks: MI sup to sit, S STS, fnxl ambulation with rollator, toilet transfer  Pt engaged in ADL routine with A required to wash buttocks and right foot  Pt also has increased difficulty threading underwear and donning socks, as well as managing clothing for toileting  Patient continues to be functioning below baseline level, occupational performance remains limited secondary to factors listed above and increased risk for falls and injury  From OT standpoint, recommendation at time of d/c would be STR  Patient to benefit from continued Occupational Therapy treatment while in the hospital to address deficits as defined above and maximize level of functional independence with ADLs and functional mobility  Pt was left in chair with alarm on after session with all current needs met   The patient's raw score on the AM-PAC Daily Activity inpatient short form is 17, standardized score is 37 26, less than 39 4  Patients at this level are likely to benefit from discharge to post-acute rehabilitation services  Please refer to the recommendation of the Occupational Therapist for safe discharge planning  Plan   Treatment Interventions ADL retraining;Functional transfer training;UE strengthening/ROM; Endurance training;Patient/family training;Equipment evaluation/education; Compensatory technique education;Continued evaluation; Energy conservation; Activityengagement   Goal Expiration Date 09/01/21   OT Frequency 2-3x/wk   Recommendation   OT Discharge Recommendation Post acute rehabilitation services   OT - OK to Discharge Yes  (to rehab when medically stable )   AM-PAC Daily Activity Inpatient   Lower Body Dressing 2   Bathing 2   Toileting 3   Upper Body Dressing 3   Grooming 3   Eating 4   Daily Activity Raw Score 17   Daily Activity Standardized Score (Calc for Raw Score >=11) 37 26   AM-PAC Applied Cognition Inpatient   Following a Speech/Presentation 4   Understanding Ordinary Conversation 4   Taking Medications 4   Remembering Where Things Are Placed or Put Away 4   Remembering List of 4-5 Errands 4   Taking Care of Complicated Tasks 3   Applied Cognition Raw Score 23   Applied Cognition Standardized Score 53 08       Suman Alvarado MS, OTR/L

## 2021-08-24 NOTE — PLAN OF CARE
Problem: OCCUPATIONAL THERAPY ADULT  Goal: Performs self-care activities at highest level of function for planned discharge setting  See evaluation for individualized goals  Description: Treatment Interventions: ADL retraining, Functional transfer training, Endurance training, Cognitive reorientation, Patient/family training, Compensatory technique education, Continued evaluation          See flowsheet documentation for full assessment, interventions and recommendations  Outcome: Progressing  Note: Limitation: Decreased ADL status, Decreased Safe judgement during ADL, Decreased endurance, Decreased cognition, Decreased high-level ADLs, Decreased self-care trans  Prognosis: Good  Assessment: Patient participated in Skilled OT session this date with interventions consisting of ADL re training with the use of correct body mechnaics,  therapeutic activities to: increase activity tolerance and increase OOB/ sitting tolerance   Upon arrival patient was found supine in bed  Pt demonstrated the following tasks: MI sup to sit, S STS, fnxl ambulation with rollator, toilet transfer  Pt engaged in ADL routine with A required to wash buttocks and right foot  Pt also has increased difficulty threading underwear and donning socks, as well as managing clothing for toileting  Patient continues to be functioning below baseline level, occupational performance remains limited secondary to factors listed above and increased risk for falls and injury  From OT standpoint, recommendation at time of d/c would be STR  Patient to benefit from continued Occupational Therapy treatment while in the hospital to address deficits as defined above and maximize level of functional independence with ADLs and functional mobility  Pt was left in chair with alarm on after session with all current needs met  The patient's raw score on the AM-PAC Daily Activity inpatient short form is 17, standardized score is 37 26, less than 39 4   Patients at this level are likely to benefit from discharge to post-acute rehabilitation services  Please refer to the recommendation of the Occupational Therapist for safe discharge planning       OT Discharge Recommendation: Post acute rehabilitation services  OT - OK to Discharge: Yes (to rehab when medically stable )

## 2021-08-24 NOTE — PROGRESS NOTES
[address] [facility]  Progress Note - Jose Primer 1965, 60 y o  female MRN: [id number]  Unit/Bed#: [facility] 908-01 Encounter: [id number]  Primary Care Provider: Fifi Greenwood MD   Date and time admitted to hospital: 8/17/2021 10:51 PM    Chronic pain syndrome  Assessment & Plan  Patient has been on chronic opioids for low back and leg pain  She was seen in the clinic and discussed opioid tapering  Most recent opioid prescription per PDMP was for morphine 15 mg for 10 days (20 tablets)  Per chart review, concern for polypharmacy and suspicion of drug-seeking  · Scheduled Tylenol  · Lidocaine patch/Voltaren gel  · Added Lyrica 100 mg b i d  8/19 --> frequency increased to t i d  8/23  · Added Oxy IR 5 mg q 12h p r n  moderate pain 8/19-->may consider changing to q8h depending on patient usage  · Added Aqua K 8/21    Generalized anxiety disorder  Assessment & Plan  Patient is on multiple psychiatric medications and was recently evaluated by inpatient psych at [facility] with adjustments to her medications including buspirone 50 mg t i d , paroxetine 60 mg daily, quetiapine 50 mg 4 times daily, mirtazapine 7 5 mg q h s , lithium 300 mg q h s  And hydroxyzine 50 mg t i d  P r n  Anjali Guido Patient continues to have increased anxiety with daily morning panic attacks  Patient also reports that her anxiety is worsened by her lack of pain control  Plan:  · Psychiatry consulted- appreciate recommendations  · Continue medical management  · Continue current psychotropic medication  · She is psychiatrically cleared to go to SNF  · Confirmed psych medication regimen following discharge from [address] [place] [address] as noted above  * Ambulatory dysfunction  Assessment & Plan  Patient with decreased physical mobility, at risk for falls  She uses walker and wheelchair at home  Presented to the ED requesting placement to SNF     · Encourage good body mechanics and assist with transfers  · Keep personal items and call bell close to the patient  · PT and OT- recommends post acute rehab  · Patient is pending placement-case management consult    Mixed hyperlipidemia  Assessment & Plan  Continue atorvastatin    History of CVA (cerebrovascular accident)  Assessment & Plan  Continue aspirin and statin  Tardive dyskinesia  Assessment & Plan  -Continue Valbenazine 80 mg daily  -appears to have slightly improved with addition of Lyrica    Esophageal reflux  Assessment & Plan  Continue Protonix 20 mg daily  VTE Pharmacologic Prophylaxis: VTE Score: 4 Moderate Risk (Score 3-4) - Pharmacological DVT Prophylaxis Ordered: enoxaparin (Lovenox)  Patient Centered Rounds: I performed bedside rounds with nursing staff today  Discussions with Specialists or Other Care Team Provider: PT, OT,     Education and Discussions with Family / Patient: Updated  (son) via phone  Time Spent for Care: 30 minutes  More than 50% of total time spent on counseling and coordination of care as described above  Current Length of Stay: 4 day(s)  Current Patient Status: Inpatient   Certification Statement: The patient will continue to require additional inpatient hospital stay due to pending placement for LTC  Discharge Plan: Anticipate discharge in 24-48 hrs to discharge location to be determined pending rehab evaluations  Code Status: Level 1 - Full Code    Subjective: The patient was awake and laying in bed  Overnight she experienced a "panic attack" lasting for ~1 hr  The patient used breathing exercises and eventually the episode subsided and she returned to baseline  Additionally, she continues to endorse LE pain which has worsened overnight from a 7/10 to a 9/10 in severity  The patient denies fever, chills, chest pain, SOB, and N/V/D       Objective:     Vitals:   Temp (24hrs), Av 5 °F (36 9 °C), Min:98 2 °F (36 8 °C), Max:98 7 °F (37 1 °C)    Temp:  [98 2 °F (36 8 °C)-98 7 °F (37 1 °C)] 98 2 °F (36 8 °C)  HR:  [58] 58  Resp:  [16-18] 18  BP: (127-128)/(78-88) 128/78  SpO2:  [96 %] 96 %  Body mass index is 37 99 kg/m²  Input and Output Summary (last 24 hours): Intake/Output Summary (Last 24 hours) at 8/24/2021 0716  Last data filed at 8/23/2021 1656  Gross per 24 hour   Intake 880 ml   Output 600 ml   Net 280 ml       Physical Exam:   Physical Exam  Constitutional:       General: She is not in acute distress  Appearance: Normal appearance  She is not ill-appearing  HENT:      Head: Normocephalic and atraumatic  Nose: Nose normal  No congestion or rhinorrhea  Mouth/Throat:      Mouth: Mucous membranes are dry  Pharynx: Oropharynx is clear  No oropharyngeal exudate or posterior oropharyngeal erythema  Comments: Tardive dyskinesia (tongue)  Eyes:      General: No scleral icterus  Extraocular Movements: Extraocular movements intact  Conjunctiva/sclera: Conjunctivae normal       Pupils: Pupils are equal, round, and reactive to light  Cardiovascular:      Rate and Rhythm: Normal rate and regular rhythm  Pulses: Normal pulses  Heart sounds: Normal heart sounds  No murmur heard  No friction rub  Pulmonary:      Effort: Pulmonary effort is normal  No respiratory distress  Breath sounds: Normal breath sounds  No stridor  Abdominal:      General: Abdomen is flat  Bowel sounds are normal  There is no distension  Palpations: Abdomen is soft  Musculoskeletal:         General: Tenderness (lower back; bilateral LE) present  No swelling  Normal range of motion  Cervical back: Normal range of motion and neck supple  No rigidity or tenderness  Skin:     General: Skin is warm and dry  Coloration: Skin is not jaundiced or pale  Neurological:      General: No focal deficit present  Mental Status: She is alert and oriented to person, place, and time  Mental status is at baseline        Cranial Nerves: No cranial nerve deficit  Psychiatric:         Mood and Affect: Mood normal          Behavior: Behavior normal          Thought Content: Thought content normal          Judgment: Judgment normal           Additional Data:     Labs:  Results from last 7 days   Lab Units 08/23/21  0457   WBC Thousand/uL 4 61   HEMOGLOBIN g/dL 13 1   HEMATOCRIT % 39 8   PLATELETS Thousands/uL 240   NEUTROS PCT % 55   LYMPHS PCT % 30   MONOS PCT % 9   EOS PCT % 5     Results from last 7 days   Lab Units 08/24/21  0502 08/18/21  0531   SODIUM mmol/L 143 142   POTASSIUM mmol/L 3 4* 3 1*   CHLORIDE mmol/L 109* 110*   CO2 mmol/L 30 27   BUN mg/dL 8 8   CREATININE mg/dL 0 70 0 89   ANION GAP mmol/L 4 5   CALCIUM mg/dL 8 0* 8 8   ALBUMIN g/dL  --  3 6   TOTAL BILIRUBIN mg/dL  --  1 16*   ALK PHOS U/L  --  104   ALT U/L  --  18   AST U/L  --  12   GLUCOSE RANDOM mg/dL 92 103                       Lines/Drains:  Invasive Devices     None                       Imaging: No pertinent imaging reviewed      Recent Cultures (last 7 days): None        Last 24 Hours Medication List:   Current Facility-Administered Medications   Medication Dose Route Frequency Provider Last Rate    acetaminophen  650 mg Oral Q8H Consuelo Singh MD      amLODIPine  5 mg Oral Daily Selvin Harvey MD      ascorbic acid  500 mg Oral BID Selvin Harvey MD      aspirin  81 mg Oral Daily Selvin Harvey, MD      atorvastatin  40 mg Oral Daily With Wild Caceres MD      busPIRone  15 mg Oral TID Selvin Harvey MD      Diclofenac Sodium  2 g Topical 4x Daily Selvin Harvey MD      enoxaparin  40 mg Subcutaneous Daily Selvin Harvey MD      ferrous sulfate  325 mg Oral Daily With Breakfast Selvin Harvey MD      folic acid  6,198 mcg Oral Daily Selvin Harvey MD      hydrOXYzine HCL  50 mg Oral TID PRN Selvin Harvey MD      lidocaine  1 patch Topical Daily Selvin Harvey MD      lithium carbonate  300 mg Oral HS Selvin Harvey MD      LORazepam  0 5 mg Oral Q8H PRN Luigi Muckle, DO      mirtazapine  7 5 mg Oral HS PRN Olivia Ramirez, MD      ondansetron  4 mg Oral Q6H PRN Edwin Malkeeley, DO      oxyCODONE  5 mg Oral Q12H PRN Edwin Self, DO      pantoprazole  20 mg Oral Early Morning Olivia Ramirez, MD      PARoxetine  60 mg Oral Daily Olivia Ramirez, MD      polyethylene glycol  17 g Oral Daily Edwin Self, DO      prazosin  2 mg Oral Daily Olivia Ramirez, MD      pregabalin  100 mg Oral TID Edwin Self, DO      propranolol  20 mg Oral BID before breakfast/lunch Olivia Ramirez, MD      QUEtiapine  50 mg Oral 4x Daily (PC & HS) Olivia Ramirez MD      senna-docusate sodium  1 tablet Oral Daily PRN Olivia Ramirez, MD      Valbenazine Tosylate  80 mg Oral Daily Olivia Ramirez MD          Today, Patient Was Seen By: Ingrid Gonzalez    **Please Note: This note may have been constructed using a voice recognition system  **

## 2021-08-24 NOTE — PROGRESS NOTES
INTERNAL MEDICINE RESIDENCY PROGRESS NOTE     Name: Aashish Gonzalez   Age & Sex: 60 y o  female   MRN: [id number]  [facility]   Encounter: [id number]  Team: SOD Team A    PATIENT INFORMATION     Name: Aashish Gonzalez   Age & Sex: 60 y o  female   MRN: [id number]  Hospital Stay Days: 4    ASSESSMENT/PLAN     Disposition: Patient here for ambulatory dysfunction and panic/anxiety disorder  Awaiting placement at [facility] and for subacute rehab  Case management following  * Ambulatory dysfunction  Assessment & Plan  Patient with decreased physical mobility, at risk for falls  She uses walker and wheelchair at home  Presented to the ED requesting placement to SNF  · Encourage good body mechanics and assist with transfers  · Keep personal items and call bell close to the patient  · PT and OT- recommends post acute rehab  · Patient is pending placement-case management consult    Generalized anxiety disorder  Assessment & Plan  Patient is on multiple psychiatric medications and was recently evaluated by inpatient psych at [facility] with adjustments to her medications including buspirone 50 mg t i d , paroxetine 60 mg daily, quetiapine 50 mg 4 times daily, mirtazapine 7 5 mg q h s , lithium 300 mg q h s  And hydroxyzine 50 mg t i d  P r n  [facility] Patient continues to have increased anxiety with daily morning panic attacks  Patient also reports that her anxiety is worsened by her lack of pain control  Plan:  · Psychiatry consulted- appreciate recommendations  · Continue medical management  · Continue current psychotropic medication  · She is psychiatrically cleared to go to SNF  · Confirmed psych medication regimen following discharge from [address] [place] [address] as noted above  Chronic pain syndrome  Assessment & Plan  Patient has been on chronic opioids for low back and leg pain  She was seen in the clinic and discussed opioid tapering    Most recent opioid prescription per PDMP was for morphine 15 mg for 10 days (20 tablets)  Per chart review, concern for polypharmacy and suspicion of drug-seeking  · Scheduled Tylenol  · Lidocaine patch/Voltaren gel  · Added Lyrica 100 mg b i d   --> frequency increased to t i d    · Added Oxy IR 5 mg q 12h p r n  moderate pain -->may consider changing to q8h depending on patient usage  · Added Aqua K     Mixed hyperlipidemia  Assessment & Plan  Continue atorvastatin    History of CVA (cerebrovascular accident)  Assessment & Plan  Continue aspirin and statin  Tardive dyskinesia  Assessment & Plan  -Continue Valbenazine 80 mg daily  -appears to have slightly improved with addition of Lyrica    Esophageal reflux  Assessment & Plan  Continue Protonix 20 mg daily  SUBJECTIVE     The patient was awake and laying in bed  Overnight she experienced a "panic attack" lasting for ~1 hr  The patient used breathing exercises and eventually the episode subsided and she returned to baseline  Additionally, she continues to endorse LE pain which has worsened overnight from a 7/10 to a 9/10 in severity  The patient denies fever, chills, chest pain, SOB, and N/V/D  OBJECTIVE     Vitals:    21 0631 21 1515 21 2142 21 0744   BP: 139/87 127/88 128/78 111/67   BP Location: Left arm Left arm     Pulse: 65 58 58    Resp: 18 16 18 18   Temp: 98 3 °F (36 8 °C) 98 7 °F (37 1 °C) 98 2 °F (36 8 °C) 98 1 °F (36 7 °C)   TempSrc: Oral Oral     SpO2: 97%  96%    Weight:   91 2 kg (201 lb 1 oz)    Height:   5' 1" (1 549 m)       Temperature:   Temp (24hrs), Av 3 °F (36 8 °C), Min:98 1 °F (36 7 °C), Max:98 7 °F (37 1 °C)    Temperature: 98 1 °F (36 7 °C)  Intake & Output:  I/O        07 -  0700  07 -  0700  07 -  0700    P  O  1260 880     Total Intake(mL/kg) 1260 (14 2) 880 (9 6)     Urine (mL/kg/hr) 1000 (0 5) 600 (0 3)     Total Output 1000 600     Net +260 +280            Unmeasured Urine Occurrence 2 x 1 x         Weights:   IBW (Ideal Body Weight): 47 8 kg    Body mass index is 37 99 kg/m²  Weight (last 2 days)     Date/Time   Weight    08/23/21 2142   91 2 (201 06)              Physical Exam:   General:  Tardive dyskinesia  Awake, alert, and conversant  No acute distress  Eyes: Normal conjunctiva, anicteric  Round symmetric pupils  ENT: Hearing grossly intact  No nasal discharge  Neck: Neck is supple  No masses or thyromegaly  Respiratory:  Clear to auscultation  Respirations are non-labored  No wheezing  Cardiovascular:  Regular rate rhythm  No lower extremity edema  Skin: Warm  No rashes or ulcers  MSK:  Tenderness palpation at lower back and along left lower leg along IT band  Neuro: A&O x 3  Sensation and CN II-XII grossly normal    Psych: Cooperative  Appropriate mood and affect  Normal judgement  LABORATORY DATA     Labs: I have personally reviewed pertinent reports  Results from last 7 days   Lab Units 08/23/21  0457 08/22/21  0503 08/21/21  0445   WBC Thousand/uL 4 61 3 96* 3 73*   HEMOGLOBIN g/dL 13 1 12 9 13 3   HEMATOCRIT % 39 8 40 3 41 1   PLATELETS Thousands/uL 240 251 264   NEUTROS PCT % 55 49 46   MONOS PCT % 9 9 11      Results from last 7 days   Lab Units 08/24/21  0502 08/23/21  0457 08/22/21  0503 08/18/21  0531   POTASSIUM mmol/L 3 4* 3 4* 3 5 3 1*   CHLORIDE mmol/L 109* 111* 111* 110*   CO2 mmol/L 30 27 28 27   BUN mg/dL 8 9 12 8   CREATININE mg/dL 0 70 0 66 0 71 0 89   CALCIUM mg/dL 8 0* 8 1* 8 3 8 8   ALK PHOS U/L  --   --   --  104   ALT U/L  --   --   --  18   AST U/L  --   --   --  12                            IMAGING & DIAGNOSTIC TESTING     Radiology Results: I have personally reviewed pertinent reports  No results found  Other Diagnostic Testing: I have personally reviewed pertinent reports      ACTIVE MEDICATIONS     Current Facility-Administered Medications   Medication Dose Route Frequency    acetaminophen (TYLENOL) tablet 650 mg  650 mg Oral Q8H [facility]    amLODIPine (NORVASC) tablet 5 mg  5 mg Oral Daily    ascorbic acid (VITAMIN C) tablet 500 mg  500 mg Oral BID    aspirin chewable tablet 81 mg  81 mg Oral Daily    atorvastatin (LIPITOR) tablet 40 mg  40 mg Oral Daily With Dinner    busPIRone (BUSPAR) tablet 15 mg  15 mg Oral TID    Diclofenac Sodium (VOLTAREN) 1 % topical gel 2 g  2 g Topical 4x Daily    enoxaparin (LOVENOX) subcutaneous injection 40 mg  40 mg Subcutaneous Daily    ferrous sulfate tablet 325 mg  325 mg Oral Daily With Breakfast    folic acid (FOLVITE) tablet 1,000 mcg  1,000 mcg Oral Daily    hydrOXYzine HCL (ATARAX) tablet 50 mg  50 mg Oral TID PRN    lidocaine (LIDODERM) 5 % patch 1 patch  1 patch Topical Daily    lithium carbonate (LITHOBID) CR tablet 300 mg  300 mg Oral HS    LORazepam (ATIVAN) tablet 0 5 mg  0 5 mg Oral Q8H PRN    mirtazapine (REMERON) tablet 7 5 mg  7 5 mg Oral HS PRN    ondansetron (ZOFRAN-ODT) dispersible tablet 4 mg  4 mg Oral Q6H PRN    oxyCODONE (ROXICODONE) IR tablet 5 mg  5 mg Oral Q12H PRN    pantoprazole (PROTONIX) EC tablet 20 mg  20 mg Oral Early Morning    PARoxetine (PAXIL) tablet 60 mg  60 mg Oral Daily    polyethylene glycol (MIRALAX) packet 17 g  17 g Oral Daily    prazosin (MINIPRESS) capsule 2 mg  2 mg Oral Daily    pregabalin (LYRICA) capsule 100 mg  100 mg Oral TID    propranolol (INDERAL) tablet 20 mg  20 mg Oral BID before breakfast/lunch    QUEtiapine (SEROquel) tablet 50 mg  50 mg Oral 4x Daily (PC & HS)    senna-docusate sodium (SENOKOT S) 8 6-50 mg per tablet 1 tablet  1 tablet Oral Daily PRN    Valbenazine Tosylate CAPS 80 mg  80 mg Oral Daily       VTE Pharmacologic Prophylaxis: Enoxaparin (Lovenox)  VTE Mechanical Prophylaxis: sequential compression device    Portions of the record may have been created with voice recognition software  Occasional wrong word or "sound a like" substitutions may have occurred due to the inherent limitations of voice recognition software  Read the chart carefully and recognize, using context, where substitutions have occurred    ==  Zohreh Vo, [address] [facility]  Internal Medicine Residency PGY-1

## 2021-08-25 PROCEDURE — 97110 THERAPEUTIC EXERCISES: CPT

## 2021-08-25 PROCEDURE — 99232 SBSQ HOSP IP/OBS MODERATE 35: CPT | Performed by: INTERNAL MEDICINE

## 2021-08-25 PROCEDURE — 97116 GAIT TRAINING THERAPY: CPT

## 2021-08-25 RX ORDER — OXYCODONE HYDROCHLORIDE 5 MG/1
2.5 TABLET ORAL EVERY 6 HOURS PRN
Status: DISCONTINUED | OUTPATIENT
Start: 2021-08-25 | End: 2021-09-03

## 2021-08-25 RX ORDER — MUSCLE RUB CREAM 100; 150 MG/G; MG/G
CREAM TOPICAL 4 TIMES DAILY PRN
Status: DISCONTINUED | OUTPATIENT
Start: 2021-08-25 | End: 2021-09-23 | Stop reason: HOSPADM

## 2021-08-25 RX ADMIN — MENTHOL, METHYL SALICYLATE: 10; 15 CREAM TOPICAL at 19:49

## 2021-08-25 RX ADMIN — FERROUS SULFATE TAB 325 MG (65 MG ELEMENTAL FE) 325 MG: 325 (65 FE) TAB at 10:57

## 2021-08-25 RX ADMIN — PREGABALIN 100 MG: 100 CAPSULE ORAL at 17:27

## 2021-08-25 RX ADMIN — QUETIAPINE FUMARATE 50 MG: 25 TABLET ORAL at 21:41

## 2021-08-25 RX ADMIN — PANTOPRAZOLE SODIUM 20 MG: 20 TABLET, DELAYED RELEASE ORAL at 05:26

## 2021-08-25 RX ADMIN — BUSPIRONE HYDROCHLORIDE 15 MG: 10 TABLET ORAL at 10:57

## 2021-08-25 RX ADMIN — ACETAMINOPHEN 650 MG: 325 TABLET, FILM COATED ORAL at 21:41

## 2021-08-25 RX ADMIN — POLYETHYLENE GLYCOL 3350 17 G: 17 POWDER, FOR SOLUTION ORAL at 10:56

## 2021-08-25 RX ADMIN — QUETIAPINE FUMARATE 50 MG: 25 TABLET ORAL at 13:12

## 2021-08-25 RX ADMIN — BUSPIRONE HYDROCHLORIDE 15 MG: 10 TABLET ORAL at 17:27

## 2021-08-25 RX ADMIN — BUSPIRONE HYDROCHLORIDE 15 MG: 10 TABLET ORAL at 21:41

## 2021-08-25 RX ADMIN — MIRTAZAPINE 7.5 MG: 15 TABLET, FILM COATED ORAL at 21:42

## 2021-08-25 RX ADMIN — OXYCODONE HYDROCHLORIDE AND ACETAMINOPHEN 500 MG: 500 TABLET ORAL at 10:57

## 2021-08-25 RX ADMIN — ASPIRIN 81 MG CHEWABLE TABLET 81 MG: 81 TABLET CHEWABLE at 10:57

## 2021-08-25 RX ADMIN — LORAZEPAM 0.5 MG: 0.5 TABLET ORAL at 19:49

## 2021-08-25 RX ADMIN — LIDOCAINE 1 PATCH: 50 PATCH TOPICAL at 10:56

## 2021-08-25 RX ADMIN — OXYCODONE HYDROCHLORIDE 2.5 MG: 5 TABLET ORAL at 21:50

## 2021-08-25 RX ADMIN — ATORVASTATIN CALCIUM 40 MG: 40 TABLET, FILM COATED ORAL at 17:27

## 2021-08-25 RX ADMIN — QUETIAPINE FUMARATE 50 MG: 25 TABLET ORAL at 10:57

## 2021-08-25 RX ADMIN — ACETAMINOPHEN 650 MG: 325 TABLET, FILM COATED ORAL at 13:14

## 2021-08-25 RX ADMIN — OXYCODONE HYDROCHLORIDE AND ACETAMINOPHEN 500 MG: 500 TABLET ORAL at 17:27

## 2021-08-25 RX ADMIN — DICLOFENAC SODIUM 2 G: 10 GEL TOPICAL at 17:30

## 2021-08-25 RX ADMIN — OXYCODONE HYDROCHLORIDE 5 MG: 5 TABLET ORAL at 05:28

## 2021-08-25 RX ADMIN — PREGABALIN 100 MG: 100 CAPSULE ORAL at 21:41

## 2021-08-25 RX ADMIN — QUETIAPINE FUMARATE 50 MG: 25 TABLET ORAL at 17:27

## 2021-08-25 RX ADMIN — ACETAMINOPHEN 650 MG: 325 TABLET, FILM COATED ORAL at 05:26

## 2021-08-25 RX ADMIN — LORAZEPAM 0.5 MG: 0.5 TABLET ORAL at 11:22

## 2021-08-25 RX ADMIN — AMLODIPINE BESYLATE 5 MG: 5 TABLET ORAL at 10:57

## 2021-08-25 RX ADMIN — ENOXAPARIN SODIUM 40 MG: 40 INJECTION SUBCUTANEOUS at 10:57

## 2021-08-25 RX ADMIN — FOLIC ACID 1000 MCG: 1 TABLET ORAL at 10:57

## 2021-08-25 RX ADMIN — VALBENAZINE 80 MG: 80 CAPSULE ORAL at 11:22

## 2021-08-25 RX ADMIN — OXYCODONE HYDROCHLORIDE 2.5 MG: 5 TABLET ORAL at 15:40

## 2021-08-25 RX ADMIN — PAROXETINE HYDROCHLORIDE 60 MG: 20 TABLET, FILM COATED ORAL at 21:43

## 2021-08-25 RX ADMIN — PREGABALIN 100 MG: 100 CAPSULE ORAL at 10:57

## 2021-08-25 RX ADMIN — PRAZOSIN HYDROCHLORIDE 2 MG: 2 CAPSULE ORAL at 11:23

## 2021-08-25 RX ADMIN — LITHIUM CARBONATE 300 MG: 300 TABLET, FILM COATED, EXTENDED RELEASE ORAL at 21:43

## 2021-08-25 NOTE — PLAN OF CARE
Problem: PHYSICAL THERAPY ADULT  Goal: Performs mobility at highest level of function for planned discharge setting  See evaluation for individualized goals  Description: Treatment/Interventions: Functional transfer training, LE strengthening/ROM, Elevations, Therapeutic exercise, Endurance training, Patient/family training, Equipment eval/education, Bed mobility, Gait training, Spoke to nursing, OT  Equipment Recommended: Luisa Redder       See flowsheet documentation for full assessment, interventions and recommendations  Outcome: Progressing  Note: Prognosis: Fair  Problem List: Decreased strength, Decreased range of motion, Decreased endurance, Impaired balance, Decreased mobility, Pain  Assessment: Pt seen for PT treatment session this date  Therapy session focused on bed mobility, functional transfers, gait training, therapeutic exercise and endurance training in order to improve overall mobility and independence  Pt requires assist of 1 for all mobility performed this date  Pt performed bed mobility tasks at S level; min Ax1 required to perform sit to supine bed mobility tasks this date 2* c/o increased back pain- educated on log rolling technique  Pt performed multiple STS from EOB/ bedside chair with RW and min Ax1 initially; progressed to CGA/ S level  Pt ambulated in hallway with RW and min Ax1; cues for upright posture and safety  Pt performed/ tolerated seated b/l LE therex program to hips, knees, and ankles with no c/o increased pain/ discomfrt  Pt making progress toward goals  Pt was left supine in bed at the end of PT session with all needs in reach  Pt would benefit from continued PT services while in hospital to address remaining limitations  PT to continue to follow pt and recommends post-acute rehab services ones medically cleared  The patient's AM-PAC Basic Mobility Inpatient Short Form Raw Score is 17, Standardized Score is 39 67   A standardized score less than 42 9 suggests the patient may benefit from discharge to post-acute rehabilitation services  Please also refer to the recommendation of the Physical Therapist for safe discharge planning  Barriers to Discharge: Inaccessible home environment, Decreased caregiver support  Barriers to Discharge Comments: increased ambulation, son is not expected to be home all day upon discharge     PT Discharge Recommendation: Post acute rehabilitation services     PT - OK to Discharge: Yes (once medically cleared )    See flowsheet documentation for full assessment

## 2021-08-25 NOTE — PHYSICAL THERAPY NOTE
PHYSICAL THERAPY NOTE          Patient Name: Ubaldo Metzger  ESTZVCHAVA Date: 8/25/2021 08/25/21 1424   PT Last Visit   PT Visit Date 08/25/21   Note Type   Note Type Treatment   Pain Assessment   Pain Assessment Tool 0-10   Pain Score 8   Pain Location/Orientation Location: Back   Restrictions/Precautions   Weight Bearing Precautions Per Order No   Other Precautions Fall Risk;Pain   General   Chart Reviewed Yes   Response to Previous Treatment Patient with no complaints from previous session  Family/Caregiver Present No   Cognition   Overall Cognitive Status WFL   Arousal/Participation Responsive; Alert; Cooperative   Attention Within functional limits   Orientation Level Oriented X4   Memory Within functional limits   Following Commands Follows one step commands without difficulty   Comments Pt pleasant and cooperative to participate in therapy session  Pt with increased back pain this date    Bed Mobility   Supine to Sit 5  Supervision   Additional items Assist x 1;Bedrails; Increased time required   Sit to Supine 4  Minimal assistance   Additional items Assist x 1; Increased time required;Verbal cues;LE management   Additional Comments Pt supine in bed upon arrival  Pt returned to supine in bed with all needs wihtin reach at the end of therapy session  Transfers   Sit to Stand 4  Minimal assistance   Additional items Assist x 1; Increased time required;Verbal cues   Stand to Sit 5  Supervision   Additional items Assist x 1; Increased time required;Verbal cues   Additional Comments Transfers with RW; initially requiring min Ax1 progresses to CGA/ S level  Pt performed 3x STS throughout therapy session    Ambulation/Elevation   Gait pattern Excessively slow; Short stride; Foward flexed;Decreased foot clearance; Inconsistent zack;Decreased R stance; Improper Weight shift   Gait Assistance 4  Minimal assist   Additional items Assist x 1;Verbal cues   Assistive Device Rolling walker   Distance 2 x 30ft    Stair Management Assistance Not tested   Balance   Static Sitting Fair   Dynamic Sitting Fair -   Static Standing Fair -   Dynamic Standing Poor +   Ambulatory Poor +   Endurance Deficit   Endurance Deficit Yes   Endurance Deficit Description fatigue, pain    Activity Tolerance   Activity Tolerance Patient tolerated treatment well;Patient limited by pain; Patient limited by fatigue   Nurse Made Aware RN cleared pt to particiapte in therapy session    Exercises   Knee AROM Long Arc Quad Sitting;Bilateral;15 reps  (x2 sets )   Ankle Pumps Sitting;Bilateral;15 reps  (x2 sets; heel/ toe raises )   Marching Sitting;Bilateral;15 reps  (x2 sets; alternating )   Assessment   Prognosis Fair   Problem List Decreased strength;Decreased range of motion;Decreased endurance; Impaired balance;Decreased mobility;Pain   Assessment Pt seen for PT treatment session this date  Therapy session focused on bed mobility, functional transfers, gait training, therapeutic exercise and endurance training in order to improve overall mobility and independence  Pt requires assist of 1 for all mobility performed this date  Pt performed bed mobility tasks at S level; min Ax1 required to perform sit to supine bed mobility tasks this date 2* c/o increased back pain- educated on log rolling technique  Pt performed multiple STS from EOB/ bedside chair with RW and min Ax1 initially; progressed to CGA/ S level  Pt ambulated in hallway with RW and min Ax1; cues for upright posture and safety  Pt performed/ tolerated seated b/l LE therex program to hips, knees, and ankles with no c/o increased pain/ discomfrt  Pt making progress toward goals  Pt was left supine in bed at the end of PT session with all needs in reach  Pt would benefit from continued PT services while in hospital to address remaining limitations   PT to continue to follow pt and recommends post-acute rehab services ones medically cleared  The patient's AM-PAC Basic Mobility Inpatient Short Form Raw Score is 17, Standardized Score is 39 67  A standardized score less than 42 9 suggests the patient may benefit from discharge to post-acute rehabilitation services  Please also refer to the recommendation of the Physical Therapist for safe discharge planning  Barriers to Discharge Inaccessible home environment;Decreased caregiver support   Goals   Patient Goals to have less pain    STG Expiration Date 09/04/21  (extend POC goals remain appropriate )   PT Treatment Day 3   Plan   Treatment/Interventions Functional transfer training;LE strengthening/ROM; Endurance training;Patient/family training;Equipment eval/education; Bed mobility;Gait training;Spoke to nursing;Spoke to case management   Progress Progressing toward goals   PT Frequency Other (Comment)  (3-5x/wk )   Recommendation   PT Discharge Recommendation Post acute rehabilitation services   Equipment Recommended [facility] Recommended Wheeled walker   PT - OK to Discharge Yes  (once medically cleared )   AM-PAC Basic Mobility Inpatient   Turning in Bed Without Bedrails 3   Lying on Back to Sitting on Edge of Flat Bed 3   Moving Bed to Chair 3   Standing Up From Chair 3   Walk in Room 3   Climb 3-5 Stairs 2   Basic Mobility Inpatient Raw Score 17   Basic Mobility Standardized Score 39 67     Otoniel Marques, PT, DPT

## 2021-08-25 NOTE — PLAN OF CARE
Problem: Potential for Falls  Goal: Patient will remain free of falls  Description: INTERVENTIONS:  - Educate patient/family on patient safety including physical limitations  - Instruct patient to call for assistance with activity   - Consult OT/PT to assist with strengthening/mobility   - Keep Call bell within reach  - Keep bed low and locked with side rails adjusted as appropriate  - Keep care items and personal belongings within reach  - Initiate and maintain comfort rounds  - Make Fall Risk Sign visible to staff  - Offer Toileting every  in advance of need  - Initiate/Maintain  - Obtain necessary fall risk management equipment:  - Apply yellow socks and bracelet for high fall risk patients  - Consider moving patient to room near nurses station  Outcome: Progressing     Problem: MOBILITY - ADULT  Goal: Maintain or return to baseline ADL function  Description: INTERVENTIONS:  -  Assess patient's ability to carry out ADLs; assess patient's baseline for ADL function and identify physical deficits which impact ability to perform ADLs (bathing, care of mouth/teeth, toileting, grooming, dressing, etc )  - Assess/evaluate cause of self-care deficits   - Assess range of motion  - Assess patient's mobility; develop plan if impaired  - Assess patient's need for assistive devices and provide as appropriate  - Encourage maximum independence but intervene and supervise when necessary  - Involve family in performance of ADLs  - Assess for home care needs following discharge   - Consider OT consult to assist with ADL evaluation and planning for discharge  - Provide patient education as appropriate  Outcome: Progressing  Goal: Maintains/Returns to pre admission functional level  Description: INTERVENTIONS:  - Perform BMAT or MOVE assessment daily    - Set and communicate daily mobility goal to care team and patient/family/caregiver     - Collaborate with rehabilitation services on mobility goals if consulted  - Perform Range of Motion   - Reposition patient every  - Dangle patient   - Stand patient   - Ambulate patient   - Out of bed to chair   - Out of bed for meals   - Out of bed for toileting  - Record patient progress and toleration of activity level   Outcome: Progressing

## 2021-08-25 NOTE — PROGRESS NOTES
INTERNAL MEDICINE RESIDENCY PROGRESS NOTE     Name: Pipo Hairston   Age & Sex: 60 y o  female   MRN: [id number]  [facility]   Encounter: [id number]  Team: SOD Team A    PATIENT INFORMATION     Name: Pipo Hairston   Age & Sex: 60 y o  female   MRN: [id number]  Hospital Stay Days: 5    ASSESSMENT/PLAN     Disposition: Patient here for ambulatory dysfunction and panic/anxiety disorder  Awaiting placement at [facility] and for subacute rehab  * Ambulatory dysfunction  Assessment & Plan  Patient with decreased physical mobility, at risk for falls  She uses walker and wheelchair at home  Presented to the ED requesting placement to SNF  · Encourage good body mechanics and assist with transfers  · Keep personal items and call bell close to the patient  · PT and OT- recommends post acute rehab  · Patient is pending placement-case management consult    Generalized anxiety disorder  Assessment & Plan  Patient is on multiple psychiatric medications and was recently evaluated by inpatient psych at [facility] with adjustments to her medications including buspirone 50 mg t i d , paroxetine 60 mg daily, quetiapine 50 mg 4 times daily, mirtazapine 7 5 mg q h s , lithium 300 mg q h s  And hydroxyzine 50 mg t i d  P r n  Abernathy Jacque Patient continues to have increased anxiety with daily morning panic attacks  Patient also reports that her anxiety is worsened by her lack of pain control  Plan:  · Psychiatry consulted- appreciate recommendations  · Continue medical management  · Continue current psychotropic medication  · She is psychiatrically cleared to go to SNF  · Confirmed psych medication regimen following discharge from [address] [place] [address] as noted above  Chronic pain syndrome  Assessment & Plan  Patient has been on chronic opioids for low back and leg pain  She was seen in the clinic and discussed opioid tapering    Most recent opioid prescription per PDMP was for morphine 15 mg for 10 days (20 tablets)  Per chart review, concern for polypharmacy and suspicion of drug-seeking  · Scheduled Tylenol  · Lidocaine patch/Voltaren gel  · Added Lyrica 100 mg b i d   --> frequency increased to t i d    · Added Oxy IR 5 mg q 12h p r n  moderate pain -->may consider changing to q8h depending on patient usage  · Added Aqua K     Mixed hyperlipidemia  Assessment & Plan  Continue atorvastatin    History of CVA (cerebrovascular accident)  Assessment & Plan  Continue aspirin and statin  Tardive dyskinesia  Assessment & Plan  -Continue Valbenazine 80 mg daily  -appears to have slightly improved with addition of Lyrica    Esophageal reflux  Assessment & Plan  Continue Protonix 20 mg daily  SUBJECTIVE     Patient seen and examined  No acute events overnight  Patient reports that she had another episode of anxiety last evening around 9:00 p m  Venkata Landers Patient patient is not able to identify a trigger for her nightly anxiety but it appears to be a common occurrence  Patient otherwise reports feeling well this morning and denies any acute complaints at this time  Patient reports her usual bilateral knee pain and left lateral leg pain  Patient denies any fevers, chills, chest pain, shortness of breath, nausea, vomiting, diarrhea  Patient now has a PureWick in place due to urinary incontinence  Otherwise moving her bowels with no issue  OBJECTIVE     Vitals:    21 1758 21 1801 21 2209 21 0817   BP: 133/75 133/75 155/98 110/69   Pulse: 69 69 66 59   Resp:   16 18   Temp:   97 9 °F (36 6 °C) 98 1 °F (36 7 °C)   TempSrc:       SpO2:  100% 100% 99%   Weight:       Height:          Temperature:   Temp (24hrs), Av 1 °F (36 7 °C), Min:97 9 °F (36 6 °C), Max:98 2 °F (36 8 °C)    Temperature: 98 1 °F (36 7 °C)  Intake & Output:  I/O        07 -  0700 701 -  0700  07 -  0700    P  O  880 1080 240    Total Intake(mL/kg) 880 (9 6) 1080 (11 8) 240 (2 6)    Urine (mL/kg/hr) 600 (0 3) 1000 (0 5)     Total Output 600 1000     Net +280 +80 +240           Unmeasured Urine Occurrence 1 x 3 x         Weights:   IBW (Ideal Body Weight): 47 8 kg    Body mass index is 37 99 kg/m²  Weight (last 2 days)     Date/Time   Weight    08/23/21 2142   91 2 (201 06)              Physical Exam:   General: Awake, alert, and conversant  No acute distress  Eyes: Normal conjunctiva, anicteric  Round symmetric pupils  ENT: Hearing grossly intact  No nasal discharge  Neck: Neck is supple  No masses or thyromegaly  Respiratory:  Clear to auscultation bilaterally  Respirations are non-labored  No wheezing  Cardiovascular:  Regular rate rhythm  No lower extremity edema  Skin: Warm  No rashes or ulcers  Neuro: A&O x 3  Sensation and CN II-XII grossly normal    Psych: Cooperative  Appropriate mood and affect  Normal judgement  LABORATORY DATA     Labs: I have personally reviewed pertinent reports  Results from last 7 days   Lab Units 08/23/21  0457 08/22/21  0503 08/21/21  0445   WBC Thousand/uL 4 61 3 96* 3 73*   HEMOGLOBIN g/dL 13 1 12 9 13 3   HEMATOCRIT % 39 8 40 3 41 1   PLATELETS Thousands/uL 240 251 264   NEUTROS PCT % 55 49 46   MONOS PCT % 9 9 11      Results from last 7 days   Lab Units 08/24/21  0502 08/23/21  0457 08/22/21  0503   POTASSIUM mmol/L 3 4* 3 4* 3 5   CHLORIDE mmol/L 109* 111* 111*   CO2 mmol/L 30 27 28   BUN mg/dL 8 9 12   CREATININE mg/dL 0 70 0 66 0 71   CALCIUM mg/dL 8 0* 8 1* 8 3                            IMAGING & DIAGNOSTIC TESTING     Radiology Results: I have personally reviewed pertinent reports  No results found  Other Diagnostic Testing: I have personally reviewed pertinent reports      ACTIVE MEDICATIONS     Current Facility-Administered Medications   Medication Dose Route Frequency    acetaminophen (TYLENOL) tablet 650 mg  650 mg Oral Q8H [facility]    amLODIPine (NORVASC) tablet 5 mg  5 mg Oral Daily    ascorbic acid (VITAMIN C) tablet 500 mg  500 mg Oral BID    aspirin chewable tablet 81 mg  81 mg Oral Daily    atorvastatin (LIPITOR) tablet 40 mg  40 mg Oral Daily With Dinner    busPIRone (BUSPAR) tablet 15 mg  15 mg Oral TID    Diclofenac Sodium (VOLTAREN) 1 % topical gel 2 g  2 g Topical 4x Daily    enoxaparin (LOVENOX) subcutaneous injection 40 mg  40 mg Subcutaneous Daily    ferrous sulfate tablet 325 mg  325 mg Oral Daily With Breakfast    folic acid (FOLVITE) tablet 1,000 mcg  1,000 mcg Oral Daily    hydrOXYzine HCL (ATARAX) tablet 50 mg  50 mg Oral TID PRN    lidocaine (LIDODERM) 5 % patch 1 patch  1 patch Topical Daily    lithium carbonate (LITHOBID) CR tablet 300 mg  300 mg Oral HS    LORazepam (ATIVAN) tablet 0 5 mg  0 5 mg Oral Q8H PRN    mirtazapine (REMERON) tablet 7 5 mg  7 5 mg Oral HS PRN    ondansetron (ZOFRAN-ODT) dispersible tablet 4 mg  4 mg Oral Q6H PRN    oxyCODONE (ROXICODONE) IR tablet 5 mg  5 mg Oral Q12H PRN    pantoprazole (PROTONIX) EC tablet 20 mg  20 mg Oral Early Morning    PARoxetine (PAXIL) tablet 60 mg  60 mg Oral Daily    polyethylene glycol (MIRALAX) packet 17 g  17 g Oral Daily    prazosin (MINIPRESS) capsule 2 mg  2 mg Oral Daily    pregabalin (LYRICA) capsule 100 mg  100 mg Oral TID    propranolol (INDERAL) tablet 20 mg  20 mg Oral BID before breakfast/lunch    QUEtiapine (SEROquel) tablet 50 mg  50 mg Oral 4x Daily (PC & HS)    senna-docusate sodium (SENOKOT S) 8 6-50 mg per tablet 1 tablet  1 tablet Oral Daily PRN    Valbenazine Tosylate CAPS 80 mg  80 mg Oral Daily       VTE Pharmacologic Prophylaxis: Enoxaparin (Lovenox)  VTE Mechanical Prophylaxis: sequential compression device    Portions of the record may have been created with voice recognition software  Occasional wrong word or "sound a like" substitutions may have occurred due to the inherent limitations of voice recognition software    Read the chart carefully and recognize, using context, where substitutions have occurred    ==  Anayeli Handy, [facility]  Internal Medicine Residency PGY-1

## 2021-08-26 ENCOUNTER — APPOINTMENT (INPATIENT)
Dept: RADIOLOGY | Facility: HOSPITAL | Age: 56
DRG: 556 | End: 2021-08-26
Payer: COMMERCIAL

## 2021-08-26 PROBLEM — R05.9 COUGH: Status: ACTIVE | Noted: 2021-08-26

## 2021-08-26 LAB
ANION GAP SERPL CALCULATED.3IONS-SCNC: 3 MMOL/L (ref 4–13)
BASOPHILS # BLD AUTO: 0.04 THOUSANDS/ΜL (ref 0–0.1)
BASOPHILS NFR BLD AUTO: 1 % (ref 0–1)
BUN SERPL-MCNC: 10 MG/DL (ref 5–25)
CALCIUM SERPL-MCNC: 8.3 MG/DL (ref 8.3–10.1)
CHLORIDE SERPL-SCNC: 109 MMOL/L (ref 100–108)
CO2 SERPL-SCNC: 31 MMOL/L (ref 21–32)
CREAT SERPL-MCNC: 0.84 MG/DL (ref 0.6–1.3)
EOSINOPHIL # BLD AUTO: 0.22 THOUSAND/ΜL (ref 0–0.61)
EOSINOPHIL NFR BLD AUTO: 5 % (ref 0–6)
ERYTHROCYTE [DISTWIDTH] IN BLOOD BY AUTOMATED COUNT: 14.1 % (ref 11.6–15.1)
GFR SERPL CREATININE-BSD FRML MDRD: 89 ML/MIN/1.73SQ M
GLUCOSE SERPL-MCNC: 89 MG/DL (ref 65–140)
HCT VFR BLD AUTO: 39.3 % (ref 34.8–46.1)
HGB BLD-MCNC: 12.7 G/DL (ref 11.5–15.4)
IMM GRANULOCYTES # BLD AUTO: 0.02 THOUSAND/UL (ref 0–0.2)
IMM GRANULOCYTES NFR BLD AUTO: 1 % (ref 0–2)
LYMPHOCYTES # BLD AUTO: 1.6 THOUSANDS/ΜL (ref 0.6–4.47)
LYMPHOCYTES NFR BLD AUTO: 39 % (ref 14–44)
MCH RBC QN AUTO: 30.1 PG (ref 26.8–34.3)
MCHC RBC AUTO-ENTMCNC: 32.3 G/DL (ref 31.4–37.4)
MCV RBC AUTO: 93 FL (ref 82–98)
MONOCYTES # BLD AUTO: 0.39 THOUSAND/ΜL (ref 0.17–1.22)
MONOCYTES NFR BLD AUTO: 9 % (ref 4–12)
NEUTROPHILS # BLD AUTO: 1.86 THOUSANDS/ΜL (ref 1.85–7.62)
NEUTS SEG NFR BLD AUTO: 45 % (ref 43–75)
NRBC BLD AUTO-RTO: 0 /100 WBCS
PLATELET # BLD AUTO: 264 THOUSANDS/UL (ref 149–390)
PMV BLD AUTO: 9.7 FL (ref 8.9–12.7)
POTASSIUM SERPL-SCNC: 3.8 MMOL/L (ref 3.5–5.3)
RBC # BLD AUTO: 4.22 MILLION/UL (ref 3.81–5.12)
SARS-COV-2 RNA RESP QL NAA+PROBE: NEGATIVE
SODIUM SERPL-SCNC: 143 MMOL/L (ref 136–145)
WBC # BLD AUTO: 4.13 THOUSAND/UL (ref 4.31–10.16)

## 2021-08-26 PROCEDURE — U0005 INFEC AGEN DETEC AMPLI PROBE: HCPCS | Performed by: STUDENT IN AN ORGANIZED HEALTH CARE EDUCATION/TRAINING PROGRAM

## 2021-08-26 PROCEDURE — U0003 INFECTIOUS AGENT DETECTION BY NUCLEIC ACID (DNA OR RNA); SEVERE ACUTE RESPIRATORY SYNDROME CORONAVIRUS 2 (SARS-COV-2) (CORONAVIRUS DISEASE [COVID-19]), AMPLIFIED PROBE TECHNIQUE, MAKING USE OF HIGH THROUGHPUT TECHNOLOGIES AS DESCRIBED BY CMS-2020-01-R: HCPCS | Performed by: STUDENT IN AN ORGANIZED HEALTH CARE EDUCATION/TRAINING PROGRAM

## 2021-08-26 PROCEDURE — 85025 COMPLETE CBC W/AUTO DIFF WBC: CPT | Performed by: STUDENT IN AN ORGANIZED HEALTH CARE EDUCATION/TRAINING PROGRAM

## 2021-08-26 PROCEDURE — 80048 BASIC METABOLIC PNL TOTAL CA: CPT | Performed by: STUDENT IN AN ORGANIZED HEALTH CARE EDUCATION/TRAINING PROGRAM

## 2021-08-26 PROCEDURE — 99232 SBSQ HOSP IP/OBS MODERATE 35: CPT | Performed by: INTERNAL MEDICINE

## 2021-08-26 PROCEDURE — 71046 X-RAY EXAM CHEST 2 VIEWS: CPT

## 2021-08-26 RX ORDER — GUAIFENESIN/DEXTROMETHORPHAN 100-10MG/5
10 SYRUP ORAL EVERY 4 HOURS PRN
Status: DISCONTINUED | OUTPATIENT
Start: 2021-08-26 | End: 2021-09-23 | Stop reason: HOSPADM

## 2021-08-26 RX ADMIN — LITHIUM CARBONATE 300 MG: 300 TABLET, FILM COATED, EXTENDED RELEASE ORAL at 21:33

## 2021-08-26 RX ADMIN — FERROUS SULFATE TAB 325 MG (65 MG ELEMENTAL FE) 325 MG: 325 (65 FE) TAB at 08:04

## 2021-08-26 RX ADMIN — QUETIAPINE FUMARATE 50 MG: 25 TABLET ORAL at 17:36

## 2021-08-26 RX ADMIN — LORAZEPAM 0.5 MG: 0.5 TABLET ORAL at 21:33

## 2021-08-26 RX ADMIN — POLYETHYLENE GLYCOL 3350 17 G: 17 POWDER, FOR SOLUTION ORAL at 11:25

## 2021-08-26 RX ADMIN — PREGABALIN 100 MG: 100 CAPSULE ORAL at 08:04

## 2021-08-26 RX ADMIN — VALBENAZINE 80 MG: 80 CAPSULE ORAL at 11:25

## 2021-08-26 RX ADMIN — BUSPIRONE HYDROCHLORIDE 15 MG: 10 TABLET ORAL at 17:36

## 2021-08-26 RX ADMIN — PROPRANOLOL HYDROCHLORIDE 20 MG: 20 TABLET ORAL at 05:45

## 2021-08-26 RX ADMIN — ENOXAPARIN SODIUM 40 MG: 40 INJECTION SUBCUTANEOUS at 11:26

## 2021-08-26 RX ADMIN — PRAZOSIN HYDROCHLORIDE 2 MG: 2 CAPSULE ORAL at 11:27

## 2021-08-26 RX ADMIN — DICLOFENAC SODIUM 2 G: 10 GEL TOPICAL at 17:37

## 2021-08-26 RX ADMIN — LIDOCAINE 1 PATCH: 50 PATCH TOPICAL at 11:25

## 2021-08-26 RX ADMIN — QUETIAPINE FUMARATE 50 MG: 25 TABLET ORAL at 21:32

## 2021-08-26 RX ADMIN — MIRTAZAPINE 7.5 MG: 15 TABLET, FILM COATED ORAL at 21:34

## 2021-08-26 RX ADMIN — BUSPIRONE HYDROCHLORIDE 15 MG: 10 TABLET ORAL at 08:03

## 2021-08-26 RX ADMIN — PREGABALIN 100 MG: 100 CAPSULE ORAL at 17:37

## 2021-08-26 RX ADMIN — DICLOFENAC SODIUM 2 G: 10 GEL TOPICAL at 11:26

## 2021-08-26 RX ADMIN — ATORVASTATIN CALCIUM 40 MG: 40 TABLET, FILM COATED ORAL at 17:36

## 2021-08-26 RX ADMIN — QUETIAPINE FUMARATE 50 MG: 25 TABLET ORAL at 08:03

## 2021-08-26 RX ADMIN — QUETIAPINE FUMARATE 50 MG: 25 TABLET ORAL at 11:30

## 2021-08-26 RX ADMIN — ACETAMINOPHEN 650 MG: 325 TABLET, FILM COATED ORAL at 21:32

## 2021-08-26 RX ADMIN — OXYCODONE HYDROCHLORIDE AND ACETAMINOPHEN 500 MG: 500 TABLET ORAL at 08:03

## 2021-08-26 RX ADMIN — AMLODIPINE BESYLATE 5 MG: 5 TABLET ORAL at 08:03

## 2021-08-26 RX ADMIN — ACETAMINOPHEN 650 MG: 325 TABLET, FILM COATED ORAL at 05:45

## 2021-08-26 RX ADMIN — DICLOFENAC SODIUM 2 G: 10 GEL TOPICAL at 21:35

## 2021-08-26 RX ADMIN — ACETAMINOPHEN 650 MG: 325 TABLET, FILM COATED ORAL at 14:25

## 2021-08-26 RX ADMIN — PANTOPRAZOLE SODIUM 20 MG: 20 TABLET, DELAYED RELEASE ORAL at 05:45

## 2021-08-26 RX ADMIN — OXYCODONE HYDROCHLORIDE AND ACETAMINOPHEN 500 MG: 500 TABLET ORAL at 17:36

## 2021-08-26 RX ADMIN — ASPIRIN 81 MG CHEWABLE TABLET 81 MG: 81 TABLET CHEWABLE at 08:04

## 2021-08-26 RX ADMIN — PAROXETINE HYDROCHLORIDE 60 MG: 20 TABLET, FILM COATED ORAL at 20:09

## 2021-08-26 RX ADMIN — OXYCODONE HYDROCHLORIDE 2.5 MG: 5 TABLET ORAL at 14:29

## 2021-08-26 RX ADMIN — OXYCODONE HYDROCHLORIDE 2.5 MG: 5 TABLET ORAL at 08:02

## 2021-08-26 RX ADMIN — LORAZEPAM 0.5 MG: 0.5 TABLET ORAL at 11:26

## 2021-08-26 RX ADMIN — BUSPIRONE HYDROCHLORIDE 15 MG: 10 TABLET ORAL at 20:10

## 2021-08-26 RX ADMIN — OXYCODONE HYDROCHLORIDE 2.5 MG: 5 TABLET ORAL at 20:10

## 2021-08-26 RX ADMIN — FOLIC ACID 1000 MCG: 1 TABLET ORAL at 08:04

## 2021-08-26 RX ADMIN — PREGABALIN 100 MG: 100 CAPSULE ORAL at 20:10

## 2021-08-26 NOTE — ASSESSMENT & PLAN NOTE
Patient is on multiple psychiatric medications and was recently evaluated by inpatient psych at [facility] with adjustments to her medications including buspirone 50 mg t i d , paroxetine 60 mg daily, quetiapine 50 mg 4 times daily, mirtazapine 7 5 mg q h s , lithium 300 mg q h s  And hydroxyzine 50 mg t i d  CELIA Jeter Patient continues to have increased anxiety with daily morning panic attacks  Patient also reports that her anxiety is worsened by her lack of pain control  Plan:  · Continue medical management  · Continue current psychotropic medication   · She is psychiatrically cleared to go to SNF  · Patient could benefit from outpatient psychiatric evaluation and follow-up  · Confirmed psych medication regimen following discharge from [address] [place] [address] as noted above

## 2021-08-26 NOTE — ASSESSMENT & PLAN NOTE
Patient has been on chronic opioids for low back and leg pain  She was seen in the clinic and discussed opioid tapering  Most recent opioid prescription per PDMP was for morphine 15 mg for 10 days (20 tablets)  Per chart review, concern for polypharmacy and suspicion of drug-seeking  · On Tylenol 650 mg q8h  · Lidocaine patch/Voltaren gel  · On Lyrica 100 mg  T i d    · On Percocet 5-325 mg q6h prn  · Bengay topical ointment 4 times daily PRN  · 8/21 Added Aqua K    · 8/27 Added Robaxin  500 mg q 6h p r n

## 2021-08-26 NOTE — ASSESSMENT & PLAN NOTE
Patient with decreased physical mobility, at risk for falls  She uses walker and wheelchair at home  Presented to the ED requesting placement to SNF  · Encourage good body mechanics and assist with transfers  · Keep personal items and call bell close to the patient  · PT/OT recommendations- Home with home health rehabilitation

## 2021-08-26 NOTE — ASSESSMENT & PLAN NOTE
-Continue Valbenazine 80 mg daily     -appears to have slightly improved with addition of Lyrica 100 mg t i d

## 2021-08-26 NOTE — ASSESSMENT & PLAN NOTE
Patient complains of progressive worsening cough from 08/25 to 8/26  Cough is nonproductive  Patient also reports associated chills, nasal congestion, and nausea      -COVID PCR negative  -chest x-ray unremarkable  -Robitussin for cough  -continue to trend white count and fevers    -resolved as of 8/29

## 2021-08-27 LAB
ANION GAP SERPL CALCULATED.3IONS-SCNC: 5 MMOL/L (ref 4–13)
BASOPHILS # BLD AUTO: 0.02 THOUSANDS/ΜL (ref 0–0.1)
BASOPHILS NFR BLD AUTO: 1 % (ref 0–1)
BUN SERPL-MCNC: 11 MG/DL (ref 5–25)
CALCIUM SERPL-MCNC: 8.4 MG/DL (ref 8.3–10.1)
CHLORIDE SERPL-SCNC: 109 MMOL/L (ref 100–108)
CO2 SERPL-SCNC: 29 MMOL/L (ref 21–32)
CREAT SERPL-MCNC: 0.76 MG/DL (ref 0.6–1.3)
EOSINOPHIL # BLD AUTO: 0.22 THOUSAND/ΜL (ref 0–0.61)
EOSINOPHIL NFR BLD AUTO: 5 % (ref 0–6)
ERYTHROCYTE [DISTWIDTH] IN BLOOD BY AUTOMATED COUNT: 13.8 % (ref 11.6–15.1)
GFR SERPL CREATININE-BSD FRML MDRD: 101 ML/MIN/1.73SQ M
GLUCOSE SERPL-MCNC: 93 MG/DL (ref 65–140)
HCT VFR BLD AUTO: 40.2 % (ref 34.8–46.1)
HGB BLD-MCNC: 13.2 G/DL (ref 11.5–15.4)
IMM GRANULOCYTES # BLD AUTO: 0.01 THOUSAND/UL (ref 0–0.2)
IMM GRANULOCYTES NFR BLD AUTO: 0 % (ref 0–2)
LYMPHOCYTES # BLD AUTO: 1.22 THOUSANDS/ΜL (ref 0.6–4.47)
LYMPHOCYTES NFR BLD AUTO: 30 % (ref 14–44)
MCH RBC QN AUTO: 29.7 PG (ref 26.8–34.3)
MCHC RBC AUTO-ENTMCNC: 32.8 G/DL (ref 31.4–37.4)
MCV RBC AUTO: 91 FL (ref 82–98)
MONOCYTES # BLD AUTO: 0.36 THOUSAND/ΜL (ref 0.17–1.22)
MONOCYTES NFR BLD AUTO: 9 % (ref 4–12)
NEUTROPHILS # BLD AUTO: 2.22 THOUSANDS/ΜL (ref 1.85–7.62)
NEUTS SEG NFR BLD AUTO: 55 % (ref 43–75)
NRBC BLD AUTO-RTO: 0 /100 WBCS
PLATELET # BLD AUTO: 256 THOUSANDS/UL (ref 149–390)
PMV BLD AUTO: 9.5 FL (ref 8.9–12.7)
POTASSIUM SERPL-SCNC: 3.8 MMOL/L (ref 3.5–5.3)
RBC # BLD AUTO: 4.44 MILLION/UL (ref 3.81–5.12)
SODIUM SERPL-SCNC: 143 MMOL/L (ref 136–145)
WBC # BLD AUTO: 4.05 THOUSAND/UL (ref 4.31–10.16)

## 2021-08-27 PROCEDURE — 97116 GAIT TRAINING THERAPY: CPT

## 2021-08-27 PROCEDURE — 80048 BASIC METABOLIC PNL TOTAL CA: CPT | Performed by: STUDENT IN AN ORGANIZED HEALTH CARE EDUCATION/TRAINING PROGRAM

## 2021-08-27 PROCEDURE — 85025 COMPLETE CBC W/AUTO DIFF WBC: CPT | Performed by: STUDENT IN AN ORGANIZED HEALTH CARE EDUCATION/TRAINING PROGRAM

## 2021-08-27 PROCEDURE — 99232 SBSQ HOSP IP/OBS MODERATE 35: CPT | Performed by: INTERNAL MEDICINE

## 2021-08-27 RX ORDER — METHOCARBAMOL 500 MG/1
500 TABLET, FILM COATED ORAL EVERY 6 HOURS PRN
Status: DISCONTINUED | OUTPATIENT
Start: 2021-08-27 | End: 2021-09-23 | Stop reason: HOSPADM

## 2021-08-27 RX ADMIN — ACETAMINOPHEN 650 MG: 325 TABLET, FILM COATED ORAL at 06:26

## 2021-08-27 RX ADMIN — BUSPIRONE HYDROCHLORIDE 15 MG: 10 TABLET ORAL at 09:15

## 2021-08-27 RX ADMIN — FOLIC ACID 1000 MCG: 1 TABLET ORAL at 09:15

## 2021-08-27 RX ADMIN — QUETIAPINE FUMARATE 50 MG: 25 TABLET ORAL at 13:19

## 2021-08-27 RX ADMIN — PREGABALIN 100 MG: 100 CAPSULE ORAL at 21:25

## 2021-08-27 RX ADMIN — ACETAMINOPHEN 650 MG: 325 TABLET, FILM COATED ORAL at 13:19

## 2021-08-27 RX ADMIN — PRAZOSIN HYDROCHLORIDE 2 MG: 2 CAPSULE ORAL at 09:16

## 2021-08-27 RX ADMIN — PROPRANOLOL HYDROCHLORIDE 20 MG: 20 TABLET ORAL at 06:29

## 2021-08-27 RX ADMIN — MENTHOL, METHYL SALICYLATE: 10; 15 CREAM TOPICAL at 19:21

## 2021-08-27 RX ADMIN — METHOCARBAMOL 500 MG: 500 TABLET, FILM COATED ORAL at 09:28

## 2021-08-27 RX ADMIN — MIRTAZAPINE 7.5 MG: 15 TABLET, FILM COATED ORAL at 21:25

## 2021-08-27 RX ADMIN — BUSPIRONE HYDROCHLORIDE 15 MG: 10 TABLET ORAL at 21:25

## 2021-08-27 RX ADMIN — PANTOPRAZOLE SODIUM 20 MG: 20 TABLET, DELAYED RELEASE ORAL at 06:27

## 2021-08-27 RX ADMIN — OXYCODONE HYDROCHLORIDE 2.5 MG: 5 TABLET ORAL at 06:26

## 2021-08-27 RX ADMIN — PREGABALIN 100 MG: 100 CAPSULE ORAL at 16:32

## 2021-08-27 RX ADMIN — QUETIAPINE FUMARATE 50 MG: 25 TABLET ORAL at 09:15

## 2021-08-27 RX ADMIN — VALBENAZINE 80 MG: 80 CAPSULE ORAL at 09:17

## 2021-08-27 RX ADMIN — LORAZEPAM 0.5 MG: 0.5 TABLET ORAL at 18:22

## 2021-08-27 RX ADMIN — LIDOCAINE 1 PATCH: 50 PATCH TOPICAL at 09:16

## 2021-08-27 RX ADMIN — ASPIRIN 81 MG CHEWABLE TABLET 81 MG: 81 TABLET CHEWABLE at 09:15

## 2021-08-27 RX ADMIN — QUETIAPINE FUMARATE 50 MG: 25 TABLET ORAL at 17:46

## 2021-08-27 RX ADMIN — PAROXETINE HYDROCHLORIDE 60 MG: 20 TABLET, FILM COATED ORAL at 21:28

## 2021-08-27 RX ADMIN — ENOXAPARIN SODIUM 40 MG: 40 INJECTION SUBCUTANEOUS at 09:15

## 2021-08-27 RX ADMIN — QUETIAPINE FUMARATE 50 MG: 25 TABLET ORAL at 21:24

## 2021-08-27 RX ADMIN — ATORVASTATIN CALCIUM 40 MG: 40 TABLET, FILM COATED ORAL at 16:28

## 2021-08-27 RX ADMIN — LITHIUM CARBONATE 300 MG: 300 TABLET, FILM COATED, EXTENDED RELEASE ORAL at 21:27

## 2021-08-27 RX ADMIN — FERROUS SULFATE TAB 325 MG (65 MG ELEMENTAL FE) 325 MG: 325 (65 FE) TAB at 09:14

## 2021-08-27 RX ADMIN — OXYCODONE HYDROCHLORIDE AND ACETAMINOPHEN 500 MG: 500 TABLET ORAL at 17:46

## 2021-08-27 RX ADMIN — PROPRANOLOL HYDROCHLORIDE 20 MG: 20 TABLET ORAL at 16:27

## 2021-08-27 RX ADMIN — METHOCARBAMOL 500 MG: 500 TABLET, FILM COATED ORAL at 16:28

## 2021-08-27 RX ADMIN — AMLODIPINE BESYLATE 5 MG: 5 TABLET ORAL at 09:14

## 2021-08-27 RX ADMIN — PREGABALIN 100 MG: 100 CAPSULE ORAL at 09:14

## 2021-08-27 RX ADMIN — ACETAMINOPHEN 650 MG: 325 TABLET, FILM COATED ORAL at 21:25

## 2021-08-27 RX ADMIN — OXYCODONE HYDROCHLORIDE AND ACETAMINOPHEN 500 MG: 500 TABLET ORAL at 09:15

## 2021-08-27 RX ADMIN — BUSPIRONE HYDROCHLORIDE 15 MG: 10 TABLET ORAL at 16:28

## 2021-08-27 RX ADMIN — OXYCODONE HYDROCHLORIDE 2.5 MG: 5 TABLET ORAL at 21:26

## 2021-08-27 RX ADMIN — DICLOFENAC SODIUM 2 G: 10 GEL TOPICAL at 09:20

## 2021-08-27 RX ADMIN — LORAZEPAM 0.5 MG: 0.5 TABLET ORAL at 09:28

## 2021-08-27 NOTE — PLAN OF CARE
Problem: Potential for Falls  Goal: Patient will remain free of falls  Description: INTERVENTIONS:  - Educate patient/family on patient safety including physical limitations  - Instruct patient to call for assistance with activity   - Consult OT/PT to assist with strengthening/mobility   - Keep Call bell within reach  - Keep bed low and locked with side rails adjusted as appropriate  - Keep care items and personal belongings within reach  - Initiate and maintain comfort rounds  - Make Fall Risk Sign visible to staff  - Offer Toileting every , in advance of need  - Initiate/Maintain   - Obtain necessary fall risk management equipment:   - Apply yellow socks and bracelet for high fall risk patients  - Consider moving patient to room near nurses station  Outcome: Progressing     Problem: MOBILITY - ADULT  Goal: Maintain or return to baseline ADL function  Description: INTERVENTIONS:  -  Assess patient's ability to carry out ADLs; assess patient's baseline for ADL function and identify physical deficits which impact ability to perform ADLs (bathing, care of mouth/teeth, toileting, grooming, dressing, etc )  - Assess/evaluate cause of self-care deficits   - Assess range of motion  - Assess patient's mobility; develop plan if impaired  - Assess patient's need for assistive devices and provide as appropriate  - Encourage maximum independence but intervene and supervise when necessary  - Involve family in performance of ADLs  - Assess for home care needs following discharge   - Consider OT consult to assist with ADL evaluation and planning for discharge  - Provide patient education as appropriate  Outcome: Progressing  Goal: Maintains/Returns to pre admission functional level  Description: INTERVENTIONS:  - Perform BMAT or MOVE assessment daily    - Set and communicate daily mobility goal to care team and patient/family/caregiver     - Collaborate with rehabilitation services on mobility goals if consulted  - Perform Range of Motion   - Reposition patient every   - Dangle patient   - Stand patient  - Ambulate patient   - Out of bed to chair   - Out of bed for meals   - Out of bed for toileting  - Record patient progress and toleration of activity level   Outcome: Progressing

## 2021-08-27 NOTE — PHYSICAL THERAPY NOTE
PHYSICAL THERAPY NOTE          Patient Name: Carlitos Montes  QWLSH'M Date: 8/27/2021 08/27/21 1021   PT Last Visit   PT Visit Date 08/27/21   Note Type   Note Type Treatment   Pain Assessment   Pain Assessment Tool 0-10   Pain Score 8   Pain Location/Orientation Location: Back   Patient's Stated Pain Goal No pain   Hospital Pain Intervention(s) Repositioned; Ambulation/increased activity   Restrictions/Precautions   Weight Bearing Precautions Per Order No   Other Precautions Chair Alarm; Bed Alarm; Fall Risk;Pain   General   Chart Reviewed Yes   Response to Previous Treatment Patient with no complaints from previous session  Family/Caregiver Present No   Cognition   Overall Cognitive Status WFL   Arousal/Participation Responsive; Alert; Cooperative   Attention Within functional limits   Orientation Level Oriented X4   Memory Within functional limits   Following Commands Follows one step commands without difficulty   Comments Pt pleasant and cooperative to participate in therapy session this date  Pt c/o increased back pain  Bed Mobility   Supine to Sit 5  Supervision   Additional items Assist x 1; Increased time required;Verbal cues   Sit to Supine Unable to assess   Additional Comments Pt supine in bed upon arrival  Pt sitting upright in bedside chair with chair alarm on with all needs wihtin reach at the end of therapy session    Transfers   Sit to Stand 5  Supervision   Additional items Assist x 1; Increased time required   Stand to Sit 5  Supervision   Additional items Assist x 1; Increased time required   Ambulation/Elevation   Gait pattern Excessively slow; Short stride;Decreased foot clearance; Forward Flexion; Ataxia; Inconsistent zack   Gait Assistance 4  Minimal assist   Additional items Assist x 1;Verbal cues   Assistive Device Rolling walker   Distance 2 x 45ft    Stair Management Assistance Not tested   Balance   Static Sitting Fair   Dynamic Sitting Fair -   Static Standing Fair -   Dynamic Standing Poor +   Ambulatory Poor +   Endurance Deficit   Endurance Deficit Yes   Endurance Deficit Description fatigue, pain    Activity Tolerance   Activity Tolerance Patient tolerated treatment well;Patient limited by pain; Patient limited by fatigue   Nurse Made Aware RN cleared pt to participate in therapy session  Assessment   Prognosis Fair   Problem List Decreased strength;Decreased range of motion;Decreased endurance; Impaired balance;Decreased mobility;Pain   Assessment Pt seen for PT treatment session this date  Therapy session focused on bed mobility, transfer training, and gait training in order to improve overall mobility and independence  Pt requires assist of 1 for all mobility performed this date  Pt continues to be limited by increased pain and fatigue  Only able to ambulate short distances using RW and min Ax1  Pt deferring further mobility this date 2* increased back pain- RN notified  Pt making progress toward goals  Pt was left sitting upright in bedside chair with chair alarm on at the end of PT session with all needs in reach  Pt would benefit from continued PT services while in hospital to address remaining limitations  PT to continue to follow pt and recommends post-acute rehab services after d/c  The patient's AM-PAC Basic Mobility Inpatient Short Form Raw Score is 17, Standardized Score is 39 67  A standardized score less than 42 9 suggests the patient may benefit from discharge to post-acute rehabilitation services  Please also refer to the recommendation of the Physical Therapist for safe discharge planning  Barriers to Discharge Inaccessible home environment;Decreased caregiver support   Goals   Patient Goals to have less pain    STG Expiration Date 09/04/21   PT Treatment Day 4   Plan   Treatment/Interventions Functional transfer training;LE strengthening/ROM; Endurance training;Spoke to nursing   Progress Progressing toward goals   PT Frequency Other (Comment)  (3-5x/wk )   Recommendation   PT Discharge Recommendation Post acute rehabilitation services   Equipment Recommended [facility] Recommended Wheeled walker   PT - OK to Discharge Yes  (to rehab once medically cleared )   [facility] [address] Mobility Inpatient   Turning in Bed Without Bedrails 3   Lying on Back to Sitting on Edge of Flat Bed 3   Moving Bed to Chair 3   Standing Up From Chair 3   Walk in Room 3   Climb 3-5 Stairs 2   Basic Mobility Inpatient Raw Score 17   Basic Mobility Standardized Score 39 67     Nessa Mally, PT, DPT

## 2021-08-27 NOTE — PROGRESS NOTES
INTERNAL MEDICINE RESIDENCY PROGRESS NOTE     Name: Brandon Pineda   Age & Sex: 60 y o  female   MRN: [id number]  [facility]   Encounter: [id number]  Team: SOD Team A    PATIENT INFORMATION     Name: Brandon Pineda   Age & Sex: 60 y o  female   MRN: [id number]  Hospital Stay Days: 7    ASSESSMENT/PLAN     Disposition: Patient here for ambulatory dysfunction and panic/anxiety disorder  Awaiting placement at [facility] and for subacute rehab  * Ambulatory dysfunction  Assessment & Plan  Patient with decreased physical mobility, at risk for falls  She uses walker and wheelchair at home  Presented to the ED requesting placement to SNF  · Encourage good body mechanics and assist with transfers  · Keep personal items and call bell close to the patient  · PT and OT- recommends post acute rehab  · Patient is pending placement-case management consult    Generalized anxiety disorder  Assessment & Plan  Patient is on multiple psychiatric medications and was recently evaluated by inpatient psych at [address] [place] [address] with adjustments to her medications including buspirone 50 mg t i d , paroxetine 60 mg daily, quetiapine 50 mg 4 times daily, mirtazapine 7 5 mg q h s , lithium 300 mg q h s  And hydroxyzine 50 mg t i d  P r n  Marcine Grow Patient continues to have increased anxiety with daily morning panic attacks  Patient also reports that her anxiety is worsened by her lack of pain control  Plan:  · Psychiatry consulted- appreciate recommendations  · Continue medical management  · Continue current psychotropic medication  · She is psychiatrically cleared to go to SNF  · Confirmed psych medication regimen following discharge from [address] [place] [address] as noted above  Chronic pain syndrome  Assessment & Plan  Patient has been on chronic opioids for low back and leg pain  She was seen in the clinic and discussed opioid tapering    Most recent opioid prescription per PDMP was for morphine 15 mg for 10 days (20 tablets)  Per chart review, concern for polypharmacy and suspicion of drug-seeking  · Scheduled Tylenol  · Lidocaine patch/Voltaren gel  · Added Lyrica 100 mg b i d   --> frequency increased to t i d    · Oxy IR 5 mg q 12h p r n  moderate pain --> changed to 2 5 mg q6h prn  · Added Aqua K     Cough  Assessment & Plan  Patient complains of progressive worsening cough from  to   Cough is nonproductive  Patient also reports associated chills, nasal congestion, and nausea  -COVID PCR negative  -chest x-ray unremarkable  -Robitussin for cough  -continue to trend white count and fevers    Mixed hyperlipidemia  Assessment & Plan  Continue atorvastatin    History of CVA (cerebrovascular accident)  Assessment & Plan  Continue aspirin and statin  Tardive dyskinesia  Assessment & Plan  -Continue Valbenazine 80 mg daily  -appears to have slightly improved with addition of Lyrica    Esophageal reflux  Assessment & Plan  Continue Protonix 20 mg daily  SUBJECTIVE     Patient seen and examined  No acute events overnight  Patient reports that her cough and upper respiratory symptoms have improved today  Patient does report that her upper, middle and low back have been bothering her overnight  She reports muscle spasms and increasing pain  Patient otherwise denies any complaints this morning  Patient denies any nausea, vomiting, diarrhea, fevers, chills, shortness of breath, chest pain      OBJECTIVE     Vitals:    21 1129 21 1540 21 0628 21 0811   BP: 114/66 125/84 126/84 146/93   Pulse: 63 63 60 60   Resp:  18 17 18   Temp:  98 2 °F (36 8 °C)  97 6 °F (36 4 °C)   TempSrc:       SpO2: 99% 98% 97% 98%   Weight:       Height:          Temperature:   Temp (24hrs), Av 9 °F (36 6 °C), Min:97 6 °F (36 4 °C), Max:98 2 °F (36 8 °C)    Temperature: 97 6 °F (36 4 °C)  Intake & Output:  I/O        07 -  07 -  07 0700    P  O  720 1060     Total Intake(mL/kg) 720 (7 9) 1060 (11 6)     Urine (mL/kg/hr)       Total Output       Net +720 +1060            Unmeasured Urine Occurrence 6 x 2 x     Unmeasured Stool Occurrence 1 x          Weights:   IBW (Ideal Body Weight): 47 8 kg    Body mass index is 37 99 kg/m²  Weight (last 2 days)     None          Physical Exam:   General:  Tardive dyskinesia present  Awake, alert, and conversant  No acute distress  Eyes: Normal conjunctiva, anicteric  Round symmetric pupils  ENT: Hearing grossly intact  No nasal discharge  Neck: Neck is supple  No masses or thyromegaly  Respiratory:  Diffuse mild rhonchi  Respirations are non-labored  No wheezing  Cardiovascular:  Regular rate rhythm  No lower extremity edema  Skin: Warm  No rashes or ulcers  MSK:  Hypertonic paraspinals in the thoracic and lumbar region  Neuro: A&O x 3  Sensation and CN II-XII grossly normal    Psych: Cooperative  Appropriate mood and affect  Normal judgement  LABORATORY DATA     Labs: I have personally reviewed pertinent reports  Results from last 7 days   Lab Units 08/27/21  0749 08/26/21  0531 08/23/21  0457   WBC Thousand/uL 4 05* 4 13* 4 61   HEMOGLOBIN g/dL 13 2 12 7 13 1   HEMATOCRIT % 40 2 39 3 39 8   PLATELETS Thousands/uL 256 264 240   NEUTROS PCT % 55 45 55   MONOS PCT % 9 9 9      Results from last 7 days   Lab Units 08/27/21  0749 08/26/21  0531 08/24/21  0502   POTASSIUM mmol/L 3 8 3 8 3 4*   CHLORIDE mmol/L 109* 109* 109*   CO2 mmol/L 29 31 30   BUN mg/dL 11 10 8   CREATININE mg/dL 0 76 0 84 0 70   CALCIUM mg/dL 8 4 8 3 8 0*                            IMAGING & DIAGNOSTIC TESTING     Radiology Results: I have personally reviewed pertinent reports  No results found  Other Diagnostic Testing: I have personally reviewed pertinent reports      ACTIVE MEDICATIONS     Current Facility-Administered Medications   Medication Dose Route Frequency    acetaminophen (TYLENOL) tablet 650 mg  650 mg Oral Q8H [facility]    amLODIPine (NORVASC) tablet 5 mg  5 mg Oral Daily    ascorbic acid (VITAMIN C) tablet 500 mg  500 mg Oral BID    aspirin chewable tablet 81 mg  81 mg Oral Daily    atorvastatin (LIPITOR) tablet 40 mg  40 mg Oral Daily With Dinner    busPIRone (BUSPAR) tablet 15 mg  15 mg Oral TID    dextromethorphan-guaiFENesin (ROBITUSSIN DM) oral syrup 10 mL  10 mL Oral Q4H PRN    Diclofenac Sodium (VOLTAREN) 1 % topical gel 2 g  2 g Topical 4x Daily    enoxaparin (LOVENOX) subcutaneous injection 40 mg  40 mg Subcutaneous Daily    ferrous sulfate tablet 325 mg  325 mg Oral Daily With Breakfast    folic acid (FOLVITE) tablet 1,000 mcg  1,000 mcg Oral Daily    hydrOXYzine HCL (ATARAX) tablet 50 mg  50 mg Oral TID PRN    lidocaine (LIDODERM) 5 % patch 1 patch  1 patch Topical Daily    lithium carbonate (LITHOBID) CR tablet 300 mg  300 mg Oral HS    LORazepam (ATIVAN) tablet 0 5 mg  0 5 mg Oral Q8H PRN    menthol-methyl salicylate (BENGAY) 09-08 % cream   Apply externally 4x Daily PRN    methocarbamol (ROBAXIN) tablet 500 mg  500 mg Oral Q6H PRN    mirtazapine (REMERON) tablet 7 5 mg  7 5 mg Oral HS PRN    ondansetron (ZOFRAN-ODT) dispersible tablet 4 mg  4 mg Oral Q6H PRN    oxyCODONE (ROXICODONE) IR tablet 2 5 mg  2 5 mg Oral Q6H PRN    pantoprazole (PROTONIX) EC tablet 20 mg  20 mg Oral Early Morning    PARoxetine (PAXIL) tablet 60 mg  60 mg Oral Daily    polyethylene glycol (MIRALAX) packet 17 g  17 g Oral Daily    prazosin (MINIPRESS) capsule 2 mg  2 mg Oral Daily    pregabalin (LYRICA) capsule 100 mg  100 mg Oral TID    propranolol (INDERAL) tablet 20 mg  20 mg Oral BID before breakfast/lunch    QUEtiapine (SEROquel) tablet 50 mg  50 mg Oral 4x Daily (PC & HS)    senna-docusate sodium (SENOKOT S) 8 6-50 mg per tablet 1 tablet  1 tablet Oral Daily PRN    Valbenazine Tosylate CAPS 80 mg  80 mg Oral Daily       VTE Pharmacologic Prophylaxis: Enoxaparin (Lovenox)  VTE Mechanical Prophylaxis: sequential compression device    Portions of the record may have been created with voice recognition software  Occasional wrong word or "sound a like" substitutions may have occurred due to the inherent limitations of voice recognition software  Read the chart carefully and recognize, using context, where substitutions have occurred    ==  Richie Cortez, [facility]  Internal Medicine Residency PGY-1

## 2021-08-27 NOTE — CASE MANAGEMENT
Still no accepting facility  Message sent to [address] liaison & pt is a denial for all buildings  TC to [facility] & s/w Mady from admissions  Pt is a denial      Ecin message sent to [facility] [place] inquiring if any beds became available  S/w SOD A team  Updated them on no accepting facility  1530 update: Expanded search

## 2021-08-28 LAB
ANION GAP SERPL CALCULATED.3IONS-SCNC: 3 MMOL/L (ref 4–13)
BASOPHILS # BLD AUTO: 0.05 THOUSANDS/ΜL (ref 0–0.1)
BASOPHILS NFR BLD AUTO: 1 % (ref 0–1)
BUN SERPL-MCNC: 10 MG/DL (ref 5–25)
CALCIUM SERPL-MCNC: 8.1 MG/DL (ref 8.3–10.1)
CHLORIDE SERPL-SCNC: 110 MMOL/L (ref 100–108)
CO2 SERPL-SCNC: 29 MMOL/L (ref 21–32)
CREAT SERPL-MCNC: 0.72 MG/DL (ref 0.6–1.3)
EOSINOPHIL # BLD AUTO: 0.26 THOUSAND/ΜL (ref 0–0.61)
EOSINOPHIL NFR BLD AUTO: 6 % (ref 0–6)
ERYTHROCYTE [DISTWIDTH] IN BLOOD BY AUTOMATED COUNT: 13.9 % (ref 11.6–15.1)
GFR SERPL CREATININE-BSD FRML MDRD: 108 ML/MIN/1.73SQ M
GLUCOSE SERPL-MCNC: 87 MG/DL (ref 65–140)
HCT VFR BLD AUTO: 38.6 % (ref 34.8–46.1)
HGB BLD-MCNC: 12.7 G/DL (ref 11.5–15.4)
IMM GRANULOCYTES # BLD AUTO: 0.01 THOUSAND/UL (ref 0–0.2)
IMM GRANULOCYTES NFR BLD AUTO: 0 % (ref 0–2)
LYMPHOCYTES # BLD AUTO: 1.44 THOUSANDS/ΜL (ref 0.6–4.47)
LYMPHOCYTES NFR BLD AUTO: 32 % (ref 14–44)
MCH RBC QN AUTO: 30.2 PG (ref 26.8–34.3)
MCHC RBC AUTO-ENTMCNC: 32.9 G/DL (ref 31.4–37.4)
MCV RBC AUTO: 92 FL (ref 82–98)
MONOCYTES # BLD AUTO: 0.33 THOUSAND/ΜL (ref 0.17–1.22)
MONOCYTES NFR BLD AUTO: 7 % (ref 4–12)
NEUTROPHILS # BLD AUTO: 2.39 THOUSANDS/ΜL (ref 1.85–7.62)
NEUTS SEG NFR BLD AUTO: 54 % (ref 43–75)
NRBC BLD AUTO-RTO: 0 /100 WBCS
PLATELET # BLD AUTO: 257 THOUSANDS/UL (ref 149–390)
PMV BLD AUTO: 9.7 FL (ref 8.9–12.7)
POTASSIUM SERPL-SCNC: 3.2 MMOL/L (ref 3.5–5.3)
RBC # BLD AUTO: 4.21 MILLION/UL (ref 3.81–5.12)
SODIUM SERPL-SCNC: 142 MMOL/L (ref 136–145)
WBC # BLD AUTO: 4.48 THOUSAND/UL (ref 4.31–10.16)

## 2021-08-28 PROCEDURE — 85025 COMPLETE CBC W/AUTO DIFF WBC: CPT | Performed by: STUDENT IN AN ORGANIZED HEALTH CARE EDUCATION/TRAINING PROGRAM

## 2021-08-28 PROCEDURE — 80048 BASIC METABOLIC PNL TOTAL CA: CPT | Performed by: STUDENT IN AN ORGANIZED HEALTH CARE EDUCATION/TRAINING PROGRAM

## 2021-08-28 PROCEDURE — 99232 SBSQ HOSP IP/OBS MODERATE 35: CPT | Performed by: INTERNAL MEDICINE

## 2021-08-28 RX ADMIN — OXYCODONE HYDROCHLORIDE AND ACETAMINOPHEN 500 MG: 500 TABLET ORAL at 10:32

## 2021-08-28 RX ADMIN — FOLIC ACID 1000 MCG: 1 TABLET ORAL at 10:32

## 2021-08-28 RX ADMIN — LITHIUM CARBONATE 300 MG: 300 TABLET, FILM COATED, EXTENDED RELEASE ORAL at 22:04

## 2021-08-28 RX ADMIN — METHOCARBAMOL 500 MG: 500 TABLET, FILM COATED ORAL at 19:02

## 2021-08-28 RX ADMIN — OXYCODONE HYDROCHLORIDE 2.5 MG: 5 TABLET ORAL at 22:01

## 2021-08-28 RX ADMIN — BUSPIRONE HYDROCHLORIDE 15 MG: 10 TABLET ORAL at 22:02

## 2021-08-28 RX ADMIN — OXYCODONE HYDROCHLORIDE 2.5 MG: 5 TABLET ORAL at 06:46

## 2021-08-28 RX ADMIN — LIDOCAINE 1 PATCH: 50 PATCH TOPICAL at 10:35

## 2021-08-28 RX ADMIN — ASPIRIN 81 MG CHEWABLE TABLET 81 MG: 81 TABLET CHEWABLE at 10:32

## 2021-08-28 RX ADMIN — BUSPIRONE HYDROCHLORIDE 15 MG: 10 TABLET ORAL at 10:31

## 2021-08-28 RX ADMIN — VALBENAZINE 80 MG: 80 CAPSULE ORAL at 10:33

## 2021-08-28 RX ADMIN — METHOCARBAMOL 500 MG: 500 TABLET, FILM COATED ORAL at 13:00

## 2021-08-28 RX ADMIN — DICLOFENAC SODIUM 2 G: 10 GEL TOPICAL at 10:35

## 2021-08-28 RX ADMIN — PREGABALIN 100 MG: 100 CAPSULE ORAL at 17:40

## 2021-08-28 RX ADMIN — OXYCODONE HYDROCHLORIDE 2.5 MG: 5 TABLET ORAL at 13:00

## 2021-08-28 RX ADMIN — ENOXAPARIN SODIUM 40 MG: 40 INJECTION SUBCUTANEOUS at 10:31

## 2021-08-28 RX ADMIN — PRAZOSIN HYDROCHLORIDE 2 MG: 2 CAPSULE ORAL at 10:34

## 2021-08-28 RX ADMIN — PANTOPRAZOLE SODIUM 20 MG: 20 TABLET, DELAYED RELEASE ORAL at 05:40

## 2021-08-28 RX ADMIN — QUETIAPINE FUMARATE 50 MG: 25 TABLET ORAL at 17:39

## 2021-08-28 RX ADMIN — FERROUS SULFATE TAB 325 MG (65 MG ELEMENTAL FE) 325 MG: 325 (65 FE) TAB at 10:31

## 2021-08-28 RX ADMIN — PREGABALIN 100 MG: 100 CAPSULE ORAL at 10:32

## 2021-08-28 RX ADMIN — ATORVASTATIN CALCIUM 40 MG: 40 TABLET, FILM COATED ORAL at 17:40

## 2021-08-28 RX ADMIN — PROPRANOLOL HYDROCHLORIDE 20 MG: 20 TABLET ORAL at 10:33

## 2021-08-28 RX ADMIN — QUETIAPINE FUMARATE 50 MG: 25 TABLET ORAL at 10:31

## 2021-08-28 RX ADMIN — PREGABALIN 100 MG: 100 CAPSULE ORAL at 22:02

## 2021-08-28 RX ADMIN — MIRTAZAPINE 7.5 MG: 15 TABLET, FILM COATED ORAL at 22:03

## 2021-08-28 RX ADMIN — AMLODIPINE BESYLATE 5 MG: 5 TABLET ORAL at 10:32

## 2021-08-28 RX ADMIN — OXYCODONE HYDROCHLORIDE AND ACETAMINOPHEN 500 MG: 500 TABLET ORAL at 17:40

## 2021-08-28 RX ADMIN — BUSPIRONE HYDROCHLORIDE 15 MG: 10 TABLET ORAL at 17:40

## 2021-08-28 RX ADMIN — PAROXETINE HYDROCHLORIDE 60 MG: 20 TABLET, FILM COATED ORAL at 22:04

## 2021-08-28 RX ADMIN — ACETAMINOPHEN 650 MG: 325 TABLET, FILM COATED ORAL at 22:02

## 2021-08-28 RX ADMIN — LORAZEPAM 0.5 MG: 0.5 TABLET ORAL at 10:31

## 2021-08-28 RX ADMIN — ACETAMINOPHEN 650 MG: 325 TABLET, FILM COATED ORAL at 13:00

## 2021-08-28 RX ADMIN — QUETIAPINE FUMARATE 50 MG: 25 TABLET ORAL at 13:00

## 2021-08-28 RX ADMIN — QUETIAPINE FUMARATE 50 MG: 25 TABLET ORAL at 22:02

## 2021-08-28 RX ADMIN — PROPRANOLOL HYDROCHLORIDE 20 MG: 20 TABLET ORAL at 22:08

## 2021-08-28 RX ADMIN — ACETAMINOPHEN 650 MG: 325 TABLET, FILM COATED ORAL at 05:40

## 2021-08-28 NOTE — PROGRESS NOTES
INTERNAL MEDICINE RESIDENCY PROGRESS NOTE     Name: Abigail Hsu   Age & Sex: 60 y o  female   MRN: [id number]  Unit/Bed#: [facility] 908-01   Encounter: [id number]  Team: SOD Team A    PATIENT INFORMATION     Name: Abigail Hsu   Age & Sex: 60 y o  female   MRN: [id number]  Hospital Stay Days: 8    ASSESSMENT/PLAN     Principal Problem:    Ambulatory dysfunction  Active Problems:    Chronic pain syndrome    Esophageal reflux    Generalized anxiety disorder    Tardive dyskinesia    History of CVA (cerebrovascular accident)    Mixed hyperlipidemia    Cough      Cough  Assessment & Plan  Patient complains of progressive worsening cough from 08/25 to 8/26  Cough is nonproductive  Patient also reports associated chills, nasal congestion, and nausea  -COVID PCR negative  -chest x-ray unremarkable  -Robitussin for cough  -continue to trend white count and fevers    Mixed hyperlipidemia  Assessment & Plan  Continue atorvastatin    History of CVA (cerebrovascular accident)  Assessment & Plan  Continue aspirin and statin  Tardive dyskinesia  Assessment & Plan  -Continue Valbenazine 80 mg daily  -appears to have slightly improved with addition of Lyrica    Generalized anxiety disorder  Assessment & Plan  Patient is on multiple psychiatric medications and was recently evaluated by inpatient psych at [address] [place] [address] with adjustments to her medications including buspirone 50 mg t i d , paroxetine 60 mg daily, quetiapine 50 mg 4 times daily, mirtazapine 7 5 mg q h s , lithium 300 mg q h s  And hydroxyzine 50 mg t i d  P r raven Alexander Patient continues to have increased anxiety with daily morning panic attacks  Patient also reports that her anxiety is worsened by her lack of pain control      Plan:  · Psychiatry consulted- appreciate recommendations  · Continue medical management  · Continue current psychotropic medication  · She is psychiatrically cleared to go to SNF  · Confirmed psych medication regimen following discharge from [address] [place], [place] [address]s as noted above  Esophageal reflux  Assessment & Plan  Continue Protonix 20 mg daily  Chronic pain syndrome  Assessment & Plan  Patient has been on chronic opioids for low back and leg pain  She was seen in the clinic and discussed opioid tapering  Most recent opioid prescription per PDMP was for morphine 15 mg for 10 days (20 tablets)  Per chart review, concern for polypharmacy and suspicion of drug-seeking  · Scheduled Tylenol  · Lidocaine patch/Voltaren gel  · Added Lyrica 100 mg b i d   --> frequency increased to t i d    · Oxy IR 5 mg q 12h p r n  moderate pain --> changed to 2 5 mg q6h prn  · Added Aqua K     * Ambulatory dysfunction  Assessment & Plan  Patient with decreased physical mobility, at risk for falls  She uses walker and wheelchair at home  Presented to the ED requesting placement to SNF  · Encourage good body mechanics and assist with transfers  · Keep personal items and call bell close to the patient  · PT and OT- recommends post acute rehab  · Patient is pending placement-case management consult      Disposition: Awaiting placement at [facility]TCF and for subacute rehab       SUBJECTIVE     Patient seen and examined  No acute events overnight  Patient doing well overall  Reports pain in lower back and bilateral legs  Denies N/V, chest pain, SOB, diarrhea or constipation, abdominal pain or any other symptoms  OBJECTIVE     Vitals:    21 1318 21 1510 21 2051 21 0739   BP: 120/74 117/63 120/75 148/95   Pulse: 62   65   Resp: 18 15 16 18   Temp:   98 3 °F (36 8 °C) 98 °F (36 7 °C)   TempSrc:       SpO2: 98%   98%   Weight:       Height:          Temperature:   Temp (24hrs), Av 2 °F (36 8 °C), Min:98 °F (36 7 °C), Max:98 3 °F (36 8 °C)    Temperature: 98 °F (36 7 °C)  Intake & Output:  I/O       701 -  07 -  07 0700    P  O  1060 1680     Total Intake(mL/kg) 1060 (11 6) 1680 (18 4)     Net +1060 +1680            Unmeasured Urine Occurrence 2 x 1 x         Weights:   IBW (Ideal Body Weight): 47 8 kg    Body mass index is 37 99 kg/m²  Weight (last 2 days)     None        Physical Exam  Vitals and nursing note reviewed  Constitutional:       General: She is not in acute distress  Appearance: She is well-developed  HENT:      Head: Normocephalic and atraumatic  Eyes:      Conjunctiva/sclera: Conjunctivae normal    Cardiovascular:      Rate and Rhythm: Normal rate and regular rhythm  Heart sounds: No murmur heard  Pulmonary:      Effort: Pulmonary effort is normal  No respiratory distress  Breath sounds: Normal breath sounds  Abdominal:      Palpations: Abdomen is soft  Tenderness: There is no abdominal tenderness  Musculoskeletal:         General: Normal range of motion  Cervical back: Neck supple  Skin:     General: Skin is warm and dry  Neurological:      Mental Status: She is alert  Psychiatric:         Mood and Affect: Mood normal          Behavior: Behavior normal        LABORATORY DATA     Labs: I have personally reviewed pertinent reports  Results from last 7 days   Lab Units 08/28/21  0538 08/27/21  0749 08/26/21  0531   WBC Thousand/uL 4 48 4 05* 4 13*   HEMOGLOBIN g/dL 12 7 13 2 12 7   HEMATOCRIT % 38 6 40 2 39 3   PLATELETS Thousands/uL 257 256 264   NEUTROS PCT % 54 55 45   MONOS PCT % 7 9 9      Results from last 7 days   Lab Units 08/28/21  0538 08/27/21  0749 08/26/21  0531   POTASSIUM mmol/L 3 2* 3 8 3 8   CHLORIDE mmol/L 110* 109* 109*   CO2 mmol/L 29 29 31   BUN mg/dL 10 11 10   CREATININE mg/dL 0 72 0 76 0 84   CALCIUM mg/dL 8 1* 8 4 8 3                            IMAGING & DIAGNOSTIC TESTING     Radiology Results: I have personally reviewed pertinent reports  No results found  Other Diagnostic Testing: I have personally reviewed pertinent reports      ACTIVE MEDICATIONS     Current Facility-Administered Medications   Medication Dose Route Frequency    acetaminophen (TYLENOL) tablet 650 mg  650 mg Oral Q8H [address]    amLODIPine (NORVASC) tablet 5 mg  5 mg Oral Daily    ascorbic acid (VITAMIN C) tablet 500 mg  500 mg Oral BID    aspirin chewable tablet 81 mg  81 mg Oral Daily    atorvastatin (LIPITOR) tablet 40 mg  40 mg Oral Daily With Dinner    busPIRone (BUSPAR) tablet 15 mg  15 mg Oral TID    dextromethorphan-guaiFENesin (ROBITUSSIN DM) oral syrup 10 mL  10 mL Oral Q4H PRN    Diclofenac Sodium (VOLTAREN) 1 % topical gel 2 g  2 g Topical 4x Daily    enoxaparin (LOVENOX) subcutaneous injection 40 mg  40 mg Subcutaneous Daily    ferrous sulfate tablet 325 mg  325 mg Oral Daily With Breakfast    folic acid (FOLVITE) tablet 1,000 mcg  1,000 mcg Oral Daily    hydrOXYzine HCL (ATARAX) tablet 50 mg  50 mg Oral TID PRN    lidocaine (LIDODERM) 5 % patch 1 patch  1 patch Topical Daily    lithium carbonate (LITHOBID) CR tablet 300 mg  300 mg Oral HS    LORazepam (ATIVAN) tablet 0 5 mg  0 5 mg Oral Q8H PRN    menthol-methyl salicylate (BENGAY) 08-42 % cream   Apply externally 4x Daily PRN    methocarbamol (ROBAXIN) tablet 500 mg  500 mg Oral Q6H PRN    mirtazapine (REMERON) tablet 7 5 mg  7 5 mg Oral HS PRN    ondansetron (ZOFRAN-ODT) dispersible tablet 4 mg  4 mg Oral Q6H PRN    oxyCODONE (ROXICODONE) IR tablet 2 5 mg  2 5 mg Oral Q6H PRN    pantoprazole (PROTONIX) EC tablet 20 mg  20 mg Oral Early Morning    PARoxetine (PAXIL) tablet 60 mg  60 mg Oral Daily    polyethylene glycol (MIRALAX) packet 17 g  17 g Oral Daily    prazosin (MINIPRESS) capsule 2 mg  2 mg Oral Daily    pregabalin (LYRICA) capsule 100 mg  100 mg Oral TID    propranolol (INDERAL) tablet 20 mg  20 mg Oral BID before breakfast/lunch    QUEtiapine (SEROquel) tablet 50 mg  50 mg Oral 4x Daily (PC & HS)    senna-docusate sodium (SENOKOT S) 8 6-50 mg per tablet 1 tablet  1 tablet Oral Daily PRN    Valbenazine Tosylate CAPS 80 mg  80 mg Oral Daily       VTE Pharmacologic Prophylaxis: Enoxaparin (Lovenox)  VTE Mechanical Prophylaxis: sequential compression device    Portions of the record may have been created with voice recognition software  Occasional wrong word or "sound a like" substitutions may have occurred due to the inherent limitations of voice recognition software    Read the chart carefully and recognize, using context, where substitutions have occurred   ==  Lisa Jennings MD  [address]  Internal Medicine Residency PGY-1

## 2021-08-28 NOTE — PLAN OF CARE
Problem: Potential for Falls  Goal: Patient will remain free of falls  Description: INTERVENTIONS:  - Educate patient/family on patient safety including physical limitations  - Instruct patient to call for assistance with activity   - Consult OT/PT to assist with strengthening/mobility   - Keep Call bell within reach  - Keep bed low and locked with side rails adjusted as appropriate  - Keep care items and personal belongings within reach  - Initiate and maintain comfort rounds  - Make Fall Risk Sign visible to staff  - Offer Toileting every, in advance of need  - Initiate/Maintain   - Obtain necessary fall risk management equipment:   - Apply yellow socks and bracelet for high fall risk patients  - Consider moving patient to room near nurses station  Outcome: Progressing     Problem: MOBILITY - ADULT  Goal: Maintain or return to baseline ADL function  Description: INTERVENTIONS:  -  Assess patient's ability to carry out ADLs; assess patient's baseline for ADL function and identify physical deficits which impact ability to perform ADLs (bathing, care of mouth/teeth, toileting, grooming, dressing, etc )  - Assess/evaluate cause of self-care deficits   - Assess range of motion  - Assess patient's mobility; develop plan if impaired  - Assess patient's need for assistive devices and provide as appropriate  - Encourage maximum independence but intervene and supervise when necessary  - Involve family in performance of ADLs  - Assess for home care needs following discharge   - Consider OT consult to assist with ADL evaluation and planning for discharge  - Provide patient education as appropriate  Outcome: Progressing  Goal: Maintains/Returns to pre admission functional level  Description: INTERVENTIONS:  - Perform BMAT or MOVE assessment daily    - Set and communicate daily mobility goal to care team and patient/family/caregiver     - Collaborate with rehabilitation services on mobility goals if consulted  - Perform Range of Motion   - Reposition patient every   - Dangle patient  - Stand patient   - Ambulate patient   - Out of bed to chair   - Out of bed for meals   - Out of bed for toileting  - Record patient progress and toleration of activity level   Outcome: Progressing

## 2021-08-29 LAB
ANION GAP SERPL CALCULATED.3IONS-SCNC: 0 MMOL/L (ref 4–13)
BASOPHILS # BLD AUTO: 0.02 THOUSANDS/ΜL (ref 0–0.1)
BASOPHILS NFR BLD AUTO: 1 % (ref 0–1)
BUN SERPL-MCNC: 11 MG/DL (ref 5–25)
CALCIUM SERPL-MCNC: 7.9 MG/DL (ref 8.3–10.1)
CHLORIDE SERPL-SCNC: 113 MMOL/L (ref 100–108)
CO2 SERPL-SCNC: 30 MMOL/L (ref 21–32)
CREAT SERPL-MCNC: 0.81 MG/DL (ref 0.6–1.3)
EOSINOPHIL # BLD AUTO: 0.18 THOUSAND/ΜL (ref 0–0.61)
EOSINOPHIL NFR BLD AUTO: 4 % (ref 0–6)
ERYTHROCYTE [DISTWIDTH] IN BLOOD BY AUTOMATED COUNT: 13.9 % (ref 11.6–15.1)
GFR SERPL CREATININE-BSD FRML MDRD: 93 ML/MIN/1.73SQ M
GLUCOSE SERPL-MCNC: 127 MG/DL (ref 65–140)
HCT VFR BLD AUTO: 38.8 % (ref 34.8–46.1)
HGB BLD-MCNC: 12.6 G/DL (ref 11.5–15.4)
IMM GRANULOCYTES # BLD AUTO: 0.01 THOUSAND/UL (ref 0–0.2)
IMM GRANULOCYTES NFR BLD AUTO: 0 % (ref 0–2)
LYMPHOCYTES # BLD AUTO: 0.86 THOUSANDS/ΜL (ref 0.6–4.47)
LYMPHOCYTES NFR BLD AUTO: 20 % (ref 14–44)
MCH RBC QN AUTO: 29.8 PG (ref 26.8–34.3)
MCHC RBC AUTO-ENTMCNC: 32.5 G/DL (ref 31.4–37.4)
MCV RBC AUTO: 92 FL (ref 82–98)
MONOCYTES # BLD AUTO: 0.32 THOUSAND/ΜL (ref 0.17–1.22)
MONOCYTES NFR BLD AUTO: 8 % (ref 4–12)
NEUTROPHILS # BLD AUTO: 2.86 THOUSANDS/ΜL (ref 1.85–7.62)
NEUTS SEG NFR BLD AUTO: 67 % (ref 43–75)
NRBC BLD AUTO-RTO: 0 /100 WBCS
PLATELET # BLD AUTO: 251 THOUSANDS/UL (ref 149–390)
PMV BLD AUTO: 9.6 FL (ref 8.9–12.7)
POTASSIUM SERPL-SCNC: 4 MMOL/L (ref 3.5–5.3)
PROCALCITONIN SERPL-MCNC: <0.05 NG/ML
RBC # BLD AUTO: 4.23 MILLION/UL (ref 3.81–5.12)
SODIUM SERPL-SCNC: 143 MMOL/L (ref 136–145)
WBC # BLD AUTO: 4.25 THOUSAND/UL (ref 4.31–10.16)

## 2021-08-29 PROCEDURE — 84145 PROCALCITONIN (PCT): CPT | Performed by: STUDENT IN AN ORGANIZED HEALTH CARE EDUCATION/TRAINING PROGRAM

## 2021-08-29 PROCEDURE — 99232 SBSQ HOSP IP/OBS MODERATE 35: CPT | Performed by: INTERNAL MEDICINE

## 2021-08-29 PROCEDURE — 85025 COMPLETE CBC W/AUTO DIFF WBC: CPT | Performed by: STUDENT IN AN ORGANIZED HEALTH CARE EDUCATION/TRAINING PROGRAM

## 2021-08-29 PROCEDURE — 80048 BASIC METABOLIC PNL TOTAL CA: CPT | Performed by: STUDENT IN AN ORGANIZED HEALTH CARE EDUCATION/TRAINING PROGRAM

## 2021-08-29 RX ORDER — POTASSIUM CHLORIDE 20 MEQ/1
40 TABLET, EXTENDED RELEASE ORAL ONCE
Status: COMPLETED | OUTPATIENT
Start: 2021-08-29 | End: 2021-08-29

## 2021-08-29 RX ADMIN — PROPRANOLOL HYDROCHLORIDE 20 MG: 20 TABLET ORAL at 06:46

## 2021-08-29 RX ADMIN — OXYCODONE HYDROCHLORIDE 2.5 MG: 5 TABLET ORAL at 07:59

## 2021-08-29 RX ADMIN — MIRTAZAPINE 7.5 MG: 15 TABLET, FILM COATED ORAL at 21:50

## 2021-08-29 RX ADMIN — POTASSIUM CHLORIDE 40 MEQ: 1500 TABLET, EXTENDED RELEASE ORAL at 07:54

## 2021-08-29 RX ADMIN — BUSPIRONE HYDROCHLORIDE 15 MG: 10 TABLET ORAL at 07:55

## 2021-08-29 RX ADMIN — PREGABALIN 100 MG: 100 CAPSULE ORAL at 20:12

## 2021-08-29 RX ADMIN — ATORVASTATIN CALCIUM 40 MG: 40 TABLET, FILM COATED ORAL at 15:41

## 2021-08-29 RX ADMIN — LIDOCAINE 1 PATCH: 50 PATCH TOPICAL at 07:56

## 2021-08-29 RX ADMIN — LITHIUM CARBONATE 300 MG: 300 TABLET, FILM COATED, EXTENDED RELEASE ORAL at 21:40

## 2021-08-29 RX ADMIN — ENOXAPARIN SODIUM 40 MG: 40 INJECTION SUBCUTANEOUS at 07:57

## 2021-08-29 RX ADMIN — ACETAMINOPHEN 650 MG: 325 TABLET, FILM COATED ORAL at 15:41

## 2021-08-29 RX ADMIN — VALBENAZINE 80 MG: 80 CAPSULE ORAL at 07:57

## 2021-08-29 RX ADMIN — QUETIAPINE FUMARATE 50 MG: 25 TABLET ORAL at 07:53

## 2021-08-29 RX ADMIN — PROPRANOLOL HYDROCHLORIDE 20 MG: 20 TABLET ORAL at 15:41

## 2021-08-29 RX ADMIN — BUSPIRONE HYDROCHLORIDE 15 MG: 10 TABLET ORAL at 20:12

## 2021-08-29 RX ADMIN — PANTOPRAZOLE SODIUM 20 MG: 20 TABLET, DELAYED RELEASE ORAL at 06:45

## 2021-08-29 RX ADMIN — METHOCARBAMOL 500 MG: 500 TABLET, FILM COATED ORAL at 17:11

## 2021-08-29 RX ADMIN — OXYCODONE HYDROCHLORIDE AND ACETAMINOPHEN 500 MG: 500 TABLET ORAL at 17:11

## 2021-08-29 RX ADMIN — PREGABALIN 100 MG: 100 CAPSULE ORAL at 07:54

## 2021-08-29 RX ADMIN — FOLIC ACID 1000 MCG: 1 TABLET ORAL at 07:54

## 2021-08-29 RX ADMIN — QUETIAPINE FUMARATE 50 MG: 25 TABLET ORAL at 12:14

## 2021-08-29 RX ADMIN — ACETAMINOPHEN 650 MG: 325 TABLET, FILM COATED ORAL at 06:45

## 2021-08-29 RX ADMIN — POTASSIUM CHLORIDE 40 MEQ: 1500 TABLET, EXTENDED RELEASE ORAL at 15:47

## 2021-08-29 RX ADMIN — LORAZEPAM 0.5 MG: 0.5 TABLET ORAL at 08:40

## 2021-08-29 RX ADMIN — OXYCODONE HYDROCHLORIDE 2.5 MG: 5 TABLET ORAL at 15:47

## 2021-08-29 RX ADMIN — MENTHOL, METHYL SALICYLATE: 10; 15 CREAM TOPICAL at 17:11

## 2021-08-29 RX ADMIN — QUETIAPINE FUMARATE 50 MG: 25 TABLET ORAL at 21:40

## 2021-08-29 RX ADMIN — FERROUS SULFATE TAB 325 MG (65 MG ELEMENTAL FE) 325 MG: 325 (65 FE) TAB at 07:55

## 2021-08-29 RX ADMIN — PREGABALIN 100 MG: 100 CAPSULE ORAL at 15:41

## 2021-08-29 RX ADMIN — LORAZEPAM 0.5 MG: 0.5 TABLET ORAL at 17:11

## 2021-08-29 RX ADMIN — DICLOFENAC SODIUM 2 G: 10 GEL TOPICAL at 08:04

## 2021-08-29 RX ADMIN — ASPIRIN 81 MG CHEWABLE TABLET 81 MG: 81 TABLET CHEWABLE at 07:56

## 2021-08-29 RX ADMIN — ACETAMINOPHEN 650 MG: 325 TABLET, FILM COATED ORAL at 21:41

## 2021-08-29 RX ADMIN — QUETIAPINE FUMARATE 50 MG: 25 TABLET ORAL at 17:11

## 2021-08-29 RX ADMIN — PRAZOSIN HYDROCHLORIDE 2 MG: 2 CAPSULE ORAL at 07:56

## 2021-08-29 RX ADMIN — AMLODIPINE BESYLATE 5 MG: 5 TABLET ORAL at 07:55

## 2021-08-29 RX ADMIN — OXYCODONE HYDROCHLORIDE AND ACETAMINOPHEN 500 MG: 500 TABLET ORAL at 07:56

## 2021-08-29 RX ADMIN — BUSPIRONE HYDROCHLORIDE 15 MG: 10 TABLET ORAL at 15:41

## 2021-08-29 RX ADMIN — PAROXETINE HYDROCHLORIDE 60 MG: 20 TABLET, FILM COATED ORAL at 20:13

## 2021-08-29 RX ADMIN — OXYCODONE HYDROCHLORIDE 2.5 MG: 5 TABLET ORAL at 21:50

## 2021-08-29 NOTE — PROGRESS NOTES
INTERNAL MEDICINE RESIDENCY PROGRESS NOTE     Name: Malina Curtis   Age & Sex: 60 y o  female   MRN: [id number]  Unit/Bed#: [facility] 908-01   Encounter: [id number]  Team: SOD Team A    PATIENT INFORMATION     Name: Malina Curtis   Age & Sex: 60 y o  female   MRN: [id number]  Hospital Stay Days: 9    ASSESSMENT/PLAN     Disposition: Patient here for ambulatory dysfunction and panic/anxiety disorder  Awaiting placement at [facility] and for subacute rehab        * Ambulatory dysfunction  Assessment & Plan  Patient with decreased physical mobility, at risk for falls  She uses walker and wheelchair at home  Presented to the ED requesting placement to SNF  · Encourage good body mechanics and assist with transfers  · Keep personal items and call bell close to the patient  · PT and OT- recommends post acute rehab  · Patient is pending placement-case management consult    Generalized anxiety disorder  Assessment & Plan  Patient is on multiple psychiatric medications and was recently evaluated by inpatient psych at [facility] with adjustments to her medications including buspirone 50 mg t i d , paroxetine 60 mg daily, quetiapine 50 mg 4 times daily, mirtazapine 7 5 mg q h s , lithium 300 mg q h s  And hydroxyzine 50 mg t i d  P r n  Fabiana Beyer Patient continues to have increased anxiety with daily morning panic attacks  Patient also reports that her anxiety is worsened by her lack of pain control  Plan:  · Psychiatry consulted- appreciate recommendations  · Continue medical management  · Continue current psychotropic medication  · She is psychiatrically cleared to go to SNF  · Confirmed psych medication regimen following discharge from [address] [place] [address] as noted above  Chronic pain syndrome  Assessment & Plan  Patient has been on chronic opioids for low back and leg pain  She was seen in the clinic and discussed opioid tapering    Most recent opioid prescription per PDMP was for morphine 15 mg for 10 days (20 tablets)  Per chart review, concern for polypharmacy and suspicion of drug-seeking  · Scheduled Tylenol  · Lidocaine patch/Voltaren gel  · Added Lyrica 100 mg b i d   --> frequency increased to t i d    · Oxy IR 5 mg q 12h p r n  moderate pain --> changed to 2 5 mg q6h prn  ·  Added Aqua K   ·  Added Robaxin  500 mg q 6h p r n  Cough  Assessment & Plan  Patient complains of progressive worsening cough from  to   Cough is nonproductive  Patient also reports associated chills, nasal congestion, and nausea  -COVID PCR negative  -chest x-ray unremarkable  -Robitussin for cough  -continue to trend white count and fevers    -resolved as of     Mixed hyperlipidemia  Assessment & Plan  Continue atorvastatin    History of CVA (cerebrovascular accident)  Assessment & Plan  Continue aspirin and statin  Tardive dyskinesia  Assessment & Plan  -Continue Valbenazine 80 mg daily  -appears to have slightly improved with addition of Lyrica    Esophageal reflux  Assessment & Plan  Continue Protonix 20 mg daily  SUBJECTIVE     Patient seen and examined  No acute events overnight  Patient reports sleeping well overnight  Patient denies any more cough, chills, nasal congestion  Patient reports that the muscle relaxer has helped her tremendously  Patient moved her bowels yesterday  Patient denies any fevers, chest pain, shortness of breath, nausea, vomiting, diarrhea  Patient otherwise denies any questions or concerns at this time      OBJECTIVE     Vitals:    21 1743 21 2132 21 2207 21 0642   BP: (!) 131/109 111/74 114/76 119/81   Pulse: 71 59 61 67   Resp:  18 18 16   Temp:  98 2 °F (36 8 °C) 98 °F (36 7 °C)    TempSrc:       SpO2: 100% 99% 99% 99%   Weight:       Height:          Temperature:   Temp (24hrs), Av 1 °F (36 7 °C), Min:98 °F (36 7 °C), Max:98 3 °F (36 8 °C)    Temperature: 98 °F (36 7 °C)  Intake & Output:  I/O        0701 - 08/28 0700 08/28 0701 - 08/29 0700 08/29 0701 - 08/30 0700    P  O  1680 1340     Total Intake(mL/kg) 1680 (18 4) 1340 (14 7)     Urine (mL/kg/hr)  2 (0)     Total Output  2     Net +1680 +1338            Unmeasured Urine Occurrence 1 x 3 x         Weights:   IBW (Ideal Body Weight): 47 8 kg    Body mass index is 37 99 kg/m²  Weight (last 2 days)     None          Physical Exam:   General: Awake, alert, and conversant  No acute distress  Eyes: Normal conjunctiva, anicteric  Round symmetric pupils  ENT: Hearing grossly intact  No nasal discharge  Neck: Neck is supple  No masses or thyromegaly  Respiratory:  Rhonchorous  Respirations are non-labored  No wheezing  Cardiovascular:  Regular rate rhythm  No lower extremity edema  Skin: Warm  No rashes or ulcers  Neuro: A&O x 3  Sensation and CN II-XII grossly normal    Psych: Cooperative  Appropriate mood and affect  Normal judgement  LABORATORY DATA     Labs: I have personally reviewed pertinent reports  Results from last 7 days   Lab Units 08/28/21  0538 08/27/21  0749 08/26/21  0531   WBC Thousand/uL 4 48 4 05* 4 13*   HEMOGLOBIN g/dL 12 7 13 2 12 7   HEMATOCRIT % 38 6 40 2 39 3   PLATELETS Thousands/uL 257 256 264   NEUTROS PCT % 54 55 45   MONOS PCT % 7 9 9      Results from last 7 days   Lab Units 08/28/21  0538 08/27/21  0749 08/26/21  0531   POTASSIUM mmol/L 3 2* 3 8 3 8   CHLORIDE mmol/L 110* 109* 109*   CO2 mmol/L 29 29 31   BUN mg/dL 10 11 10   CREATININE mg/dL 0 72 0 76 0 84   CALCIUM mg/dL 8 1* 8 4 8 3                            IMAGING & DIAGNOSTIC TESTING     Radiology Results: I have personally reviewed pertinent reports  No results found  Other Diagnostic Testing: I have personally reviewed pertinent reports      ACTIVE MEDICATIONS     Current Facility-Administered Medications   Medication Dose Route Frequency    acetaminophen (TYLENOL) tablet 650 mg  650 mg Oral Q8H [address]    amLODIPine (NORVASC) tablet 5 mg  5 mg Oral Daily    ascorbic acid (VITAMIN C) tablet 500 mg  500 mg Oral BID    aspirin chewable tablet 81 mg  81 mg Oral Daily    atorvastatin (LIPITOR) tablet 40 mg  40 mg Oral Daily With Dinner    busPIRone (BUSPAR) tablet 15 mg  15 mg Oral TID    dextromethorphan-guaiFENesin (ROBITUSSIN DM) oral syrup 10 mL  10 mL Oral Q4H PRN    Diclofenac Sodium (VOLTAREN) 1 % topical gel 2 g  2 g Topical 4x Daily    enoxaparin (LOVENOX) subcutaneous injection 40 mg  40 mg Subcutaneous Daily    ferrous sulfate tablet 325 mg  325 mg Oral Daily With Breakfast    folic acid (FOLVITE) tablet 1,000 mcg  1,000 mcg Oral Daily    hydrOXYzine HCL (ATARAX) tablet 50 mg  50 mg Oral TID PRN    lidocaine (LIDODERM) 5 % patch 1 patch  1 patch Topical Daily    lithium carbonate (LITHOBID) CR tablet 300 mg  300 mg Oral HS    LORazepam (ATIVAN) tablet 0 5 mg  0 5 mg Oral Q8H PRN    menthol-methyl salicylate (BENGAY) 10-70 % cream   Apply externally 4x Daily PRN    methocarbamol (ROBAXIN) tablet 500 mg  500 mg Oral Q6H PRN    mirtazapine (REMERON) tablet 7 5 mg  7 5 mg Oral HS PRN    ondansetron (ZOFRAN-ODT) dispersible tablet 4 mg  4 mg Oral Q6H PRN    oxyCODONE (ROXICODONE) IR tablet 2 5 mg  2 5 mg Oral Q6H PRN    pantoprazole (PROTONIX) EC tablet 20 mg  20 mg Oral Early Morning    PARoxetine (PAXIL) tablet 60 mg  60 mg Oral Daily    polyethylene glycol (MIRALAX) packet 17 g  17 g Oral Daily    prazosin (MINIPRESS) capsule 2 mg  2 mg Oral Daily    pregabalin (LYRICA) capsule 100 mg  100 mg Oral TID    propranolol (INDERAL) tablet 20 mg  20 mg Oral BID before breakfast/lunch    QUEtiapine (SEROquel) tablet 50 mg  50 mg Oral 4x Daily (PC & HS)    senna-docusate sodium (SENOKOT S) 8 6-50 mg per tablet 1 tablet  1 tablet Oral Daily PRN    Valbenazine Tosylate CAPS 80 mg  80 mg Oral Daily       VTE Pharmacologic Prophylaxis: Enoxaparin (Lovenox)  VTE Mechanical Prophylaxis: sequential compression device    Portions of the record may have been created with voice recognition software  Occasional wrong word or "sound a like" substitutions may have occurred due to the inherent limitations of voice recognition software  Read the chart carefully and recognize, using context, where substitutions have occurred    ==  Maris Garcia, [facility]  Internal Medicine Residency PGY-1

## 2021-08-30 ENCOUNTER — PATIENT OUTREACH (OUTPATIENT)
Dept: INTERNAL MEDICINE CLINIC | Facility: CLINIC | Age: 56
End: 2021-08-30

## 2021-08-30 PROBLEM — R05.9 COUGH: Status: RESOLVED | Noted: 2021-08-26 | Resolved: 2021-08-30

## 2021-08-30 LAB
ANION GAP SERPL CALCULATED.3IONS-SCNC: 4 MMOL/L (ref 4–13)
BASOPHILS # BLD AUTO: 0.03 THOUSANDS/ΜL (ref 0–0.1)
BASOPHILS NFR BLD AUTO: 1 % (ref 0–1)
BUN SERPL-MCNC: 10 MG/DL (ref 5–25)
CALCIUM SERPL-MCNC: 8.1 MG/DL (ref 8.3–10.1)
CHLORIDE SERPL-SCNC: 110 MMOL/L (ref 100–108)
CO2 SERPL-SCNC: 28 MMOL/L (ref 21–32)
CREAT SERPL-MCNC: 0.67 MG/DL (ref 0.6–1.3)
EOSINOPHIL # BLD AUTO: 0.2 THOUSAND/ΜL (ref 0–0.61)
EOSINOPHIL NFR BLD AUTO: 5 % (ref 0–6)
ERYTHROCYTE [DISTWIDTH] IN BLOOD BY AUTOMATED COUNT: 13.9 % (ref 11.6–15.1)
GFR SERPL CREATININE-BSD FRML MDRD: 113 ML/MIN/1.73SQ M
GLUCOSE SERPL-MCNC: 88 MG/DL (ref 65–140)
HCT VFR BLD AUTO: 39.4 % (ref 34.8–46.1)
HGB BLD-MCNC: 12.5 G/DL (ref 11.5–15.4)
IMM GRANULOCYTES # BLD AUTO: 0.01 THOUSAND/UL (ref 0–0.2)
IMM GRANULOCYTES NFR BLD AUTO: 0 % (ref 0–2)
LYMPHOCYTES # BLD AUTO: 1.38 THOUSANDS/ΜL (ref 0.6–4.47)
LYMPHOCYTES NFR BLD AUTO: 33 % (ref 14–44)
MCH RBC QN AUTO: 29.4 PG (ref 26.8–34.3)
MCHC RBC AUTO-ENTMCNC: 31.7 G/DL (ref 31.4–37.4)
MCV RBC AUTO: 93 FL (ref 82–98)
MONOCYTES # BLD AUTO: 0.38 THOUSAND/ΜL (ref 0.17–1.22)
MONOCYTES NFR BLD AUTO: 9 % (ref 4–12)
NEUTROPHILS # BLD AUTO: 2.19 THOUSANDS/ΜL (ref 1.85–7.62)
NEUTS SEG NFR BLD AUTO: 52 % (ref 43–75)
NRBC BLD AUTO-RTO: 0 /100 WBCS
PLATELET # BLD AUTO: 230 THOUSANDS/UL (ref 149–390)
PMV BLD AUTO: 9.9 FL (ref 8.9–12.7)
POTASSIUM SERPL-SCNC: 3.7 MMOL/L (ref 3.5–5.3)
PROCALCITONIN SERPL-MCNC: <0.05 NG/ML
RBC # BLD AUTO: 4.25 MILLION/UL (ref 3.81–5.12)
SODIUM SERPL-SCNC: 142 MMOL/L (ref 136–145)
WBC # BLD AUTO: 4.19 THOUSAND/UL (ref 4.31–10.16)

## 2021-08-30 PROCEDURE — 85025 COMPLETE CBC W/AUTO DIFF WBC: CPT | Performed by: STUDENT IN AN ORGANIZED HEALTH CARE EDUCATION/TRAINING PROGRAM

## 2021-08-30 PROCEDURE — 97535 SELF CARE MNGMENT TRAINING: CPT

## 2021-08-30 PROCEDURE — 97110 THERAPEUTIC EXERCISES: CPT

## 2021-08-30 PROCEDURE — 99232 SBSQ HOSP IP/OBS MODERATE 35: CPT | Performed by: INTERNAL MEDICINE

## 2021-08-30 PROCEDURE — 84145 PROCALCITONIN (PCT): CPT | Performed by: STUDENT IN AN ORGANIZED HEALTH CARE EDUCATION/TRAINING PROGRAM

## 2021-08-30 PROCEDURE — 80048 BASIC METABOLIC PNL TOTAL CA: CPT | Performed by: STUDENT IN AN ORGANIZED HEALTH CARE EDUCATION/TRAINING PROGRAM

## 2021-08-30 RX ORDER — LORAZEPAM 2 MG/ML
0.5 INJECTION INTRAMUSCULAR ONCE
Status: DISCONTINUED | OUTPATIENT
Start: 2021-08-30 | End: 2021-08-30

## 2021-08-30 RX ORDER — LIDOCAINE 50 MG/G
1 PATCH TOPICAL DAILY
Status: DISCONTINUED | OUTPATIENT
Start: 2021-08-31 | End: 2021-09-23 | Stop reason: HOSPADM

## 2021-08-30 RX ORDER — LORAZEPAM 0.5 MG/1
0.5 TABLET ORAL ONCE
Status: COMPLETED | OUTPATIENT
Start: 2021-08-30 | End: 2021-08-30

## 2021-08-30 RX ADMIN — PANTOPRAZOLE SODIUM 20 MG: 20 TABLET, DELAYED RELEASE ORAL at 08:08

## 2021-08-30 RX ADMIN — DICLOFENAC SODIUM 2 G: 10 GEL TOPICAL at 16:56

## 2021-08-30 RX ADMIN — QUETIAPINE FUMARATE 50 MG: 25 TABLET ORAL at 21:07

## 2021-08-30 RX ADMIN — DICLOFENAC SODIUM 2 G: 10 GEL TOPICAL at 21:09

## 2021-08-30 RX ADMIN — BUSPIRONE HYDROCHLORIDE 15 MG: 10 TABLET ORAL at 08:08

## 2021-08-30 RX ADMIN — LORAZEPAM 0.5 MG: 0.5 TABLET ORAL at 21:07

## 2021-08-30 RX ADMIN — MENTHOL, METHYL SALICYLATE: 10; 15 CREAM TOPICAL at 19:22

## 2021-08-30 RX ADMIN — PAROXETINE HYDROCHLORIDE 60 MG: 20 TABLET, FILM COATED ORAL at 21:11

## 2021-08-30 RX ADMIN — PRAZOSIN HYDROCHLORIDE 2 MG: 2 CAPSULE ORAL at 08:10

## 2021-08-30 RX ADMIN — HYDROXYZINE HYDROCHLORIDE 50 MG: 25 TABLET, FILM COATED ORAL at 14:15

## 2021-08-30 RX ADMIN — FERROUS SULFATE TAB 325 MG (65 MG ELEMENTAL FE) 325 MG: 325 (65 FE) TAB at 08:08

## 2021-08-30 RX ADMIN — OXYCODONE HYDROCHLORIDE 2.5 MG: 5 TABLET ORAL at 05:03

## 2021-08-30 RX ADMIN — PREGABALIN 100 MG: 100 CAPSULE ORAL at 16:55

## 2021-08-30 RX ADMIN — BUSPIRONE HYDROCHLORIDE 15 MG: 10 TABLET ORAL at 21:07

## 2021-08-30 RX ADMIN — ASPIRIN 81 MG CHEWABLE TABLET 81 MG: 81 TABLET CHEWABLE at 08:08

## 2021-08-30 RX ADMIN — OXYCODONE HYDROCHLORIDE 2.5 MG: 5 TABLET ORAL at 16:07

## 2021-08-30 RX ADMIN — FOLIC ACID 1000 MCG: 1 TABLET ORAL at 08:08

## 2021-08-30 RX ADMIN — ACETAMINOPHEN 650 MG: 325 TABLET, FILM COATED ORAL at 08:08

## 2021-08-30 RX ADMIN — QUETIAPINE FUMARATE 50 MG: 25 TABLET ORAL at 16:55

## 2021-08-30 RX ADMIN — LORAZEPAM 0.5 MG: 0.5 TABLET ORAL at 16:59

## 2021-08-30 RX ADMIN — VALBENAZINE 80 MG: 80 CAPSULE ORAL at 08:11

## 2021-08-30 RX ADMIN — AMLODIPINE BESYLATE 5 MG: 5 TABLET ORAL at 08:08

## 2021-08-30 RX ADMIN — MENTHOL, METHYL SALICYLATE 1 APPLICATION: 10; 15 CREAM TOPICAL at 11:06

## 2021-08-30 RX ADMIN — ENOXAPARIN SODIUM 40 MG: 40 INJECTION SUBCUTANEOUS at 08:09

## 2021-08-30 RX ADMIN — QUETIAPINE FUMARATE 50 MG: 25 TABLET ORAL at 11:05

## 2021-08-30 RX ADMIN — ACETAMINOPHEN 650 MG: 325 TABLET, FILM COATED ORAL at 13:42

## 2021-08-30 RX ADMIN — PREGABALIN 100 MG: 100 CAPSULE ORAL at 08:12

## 2021-08-30 RX ADMIN — DICLOFENAC SODIUM 2 G: 10 GEL TOPICAL at 11:04

## 2021-08-30 RX ADMIN — LITHIUM CARBONATE 300 MG: 300 TABLET, FILM COATED, EXTENDED RELEASE ORAL at 21:09

## 2021-08-30 RX ADMIN — ATORVASTATIN CALCIUM 40 MG: 40 TABLET, FILM COATED ORAL at 16:55

## 2021-08-30 RX ADMIN — LORAZEPAM 0.5 MG: 0.5 TABLET ORAL at 08:13

## 2021-08-30 RX ADMIN — PREGABALIN 100 MG: 100 CAPSULE ORAL at 21:12

## 2021-08-30 RX ADMIN — OXYCODONE HYDROCHLORIDE AND ACETAMINOPHEN 500 MG: 500 TABLET ORAL at 16:55

## 2021-08-30 RX ADMIN — LIDOCAINE 1 PATCH: 50 PATCH TOPICAL at 21:13

## 2021-08-30 RX ADMIN — OXYCODONE HYDROCHLORIDE AND ACETAMINOPHEN 500 MG: 500 TABLET ORAL at 08:08

## 2021-08-30 RX ADMIN — QUETIAPINE FUMARATE 50 MG: 25 TABLET ORAL at 08:08

## 2021-08-30 RX ADMIN — BUSPIRONE HYDROCHLORIDE 15 MG: 10 TABLET ORAL at 16:55

## 2021-08-30 RX ADMIN — ACETAMINOPHEN 650 MG: 325 TABLET, FILM COATED ORAL at 21:07

## 2021-08-30 NOTE — PROGRESS NOTES
INTERNAL MEDICINE RESIDENCY PROGRESS NOTE     Name: Indira Clark   Age & Sex: 60 y o  female   MRN: [id number]  Unit/Bed#: [facility] 908-01   Encounter: [id number]  Team: SOD Team A    PATIENT INFORMATION     Name: Indira Clark   Age & Sex: 60 y o  female   MRN: [id number]  Hospital Stay Days: 10    ASSESSMENT/PLAN     Principal Problem:    Ambulatory dysfunction  Active Problems:    Chronic pain syndrome    Esophageal reflux    Generalized anxiety disorder    Tardive dyskinesia    History of CVA (cerebrovascular accident)    Mixed hyperlipidemia    Cough      Mixed hyperlipidemia  Assessment & Plan  -On Atorvastatin 40 mg PO qd    History of CVA (cerebrovascular accident)  Assessment & Plan  -On Aspirin 81 mg PO qd  -On Atorvastatin 40 mg PO qd    Tardive dyskinesia  Assessment & Plan  -Continue Valbenazine 80 mg daily  -appears to have slightly improved with addition of Lyrica 100 mg t i d     Generalized anxiety disorder  Assessment & Plan  Patient is on multiple psychiatric medications and was recently evaluated by inpatient psych at [facility] with adjustments to her medications including buspirone 50 mg t i d , paroxetine 60 mg daily, quetiapine 50 mg 4 times daily, mirtazapine 7 5 mg q h s , lithium 300 mg q h s  And hydroxyzine 50 mg t i d  P r n  Benjamin Figueroaores Patient continues to have increased anxiety with daily morning panic attacks  Patient also reports that her anxiety is worsened by her lack of pain control  Plan:  · Continue medical management  · Continue current psychotropic medication  · She is psychiatrically cleared to go to SNF  · Patient could benefit from outpatient psychiatric evaluation and follow-up  · Confirmed psych medication regimen following discharge from [address] [place] [address] as noted above  Esophageal reflux  Assessment & Plan  Continue Protonix 20 mg daily  Chronic pain syndrome  Assessment & Plan  Patient has been on chronic opioids for low back and leg pain  She was seen in the clinic and discussed opioid tapering  Most recent opioid prescription per PDMP was for morphine 15 mg for 10 days (20 tablets)  Per chart review, concern for polypharmacy and suspicion of drug-seeking  · On Tylenol 650 mg q8h  · Lidocaine patch/Voltaren gel  · On Lyrica 100 mg  t i d   · Oxy IR 2 5 mg q6h prn  · Bengay topical ointment 4 times daily PRN  · 8/21 Added Aqua K   · 8/27 Added Robaxin  500 mg q 6h p r n     * Ambulatory dysfunction  Assessment & Plan  Patient with decreased physical mobility, at risk for falls  She uses walker and wheelchair at home  Presented to the ED requesting placement to SNF  · Encourage good body mechanics and assist with transfers  · Keep personal items and call bell close to the patient  · PT and OT- recommends post acute rehab  · Patient is pending placement-case management consult    Cough-resolved as of 8/30/2021  Assessment & Plan  Patient complains of progressive worsening cough from 08/25 to 8/26  Cough is nonproductive  Patient also reports associated chills, nasal congestion, and nausea  -COVID PCR negative  -chest x-ray unremarkable  -Robitussin for cough  -continue to trend white count and fevers    -resolved as of 8/29      Disposition: Patient here for ambulatory dysfunction and panic/anxiety disorder  Awaiting placement at [facility]TC[facility] and as per PT OT evaluation, patient needs post acute rehab  SUBJECTIVE     Patient seen and examined  No acute events overnight  Patient reports sleeping well overnight  Patient denies any more cough, chills, nasal congestion  Patient reports that the muscle relaxer has helped her tremendously  Patient has had good bowel movement in past 2 days  Patient denies any fevers, chest pain, shortness of breath, nausea, vomiting, diarrhea  Patient otherwise denies any questions or concerns at this time        OBJECTIVE     Vitals:    08/29/21 0642 08/29/21 1547 08/29/21 2243 08/30/21 0752   BP: 119/81 120/80 122/81 121/80   Pulse: 67 71 62 61   Resp: 16  18    Temp:   98 °F (36 7 °C) 97 9 °F (36 6 °C)   TempSrc:       SpO2: 99% 99% 97% 98%   Weight:       Height:          Temperature:   Temp (24hrs), Av °F (36 7 °C), Min:97 9 °F (36 6 °C), Max:98 °F (36 7 °C)    Temperature: 97 9 °F (36 6 °C)  Intake & Output:  I/O       701 -  07 07 -  0700    P  O  1340 1900     Total Intake(mL/kg) 1340 (14 7) 1900 (20 8)     Urine (mL/kg/hr) 2 (0) 0 (0)     Total Output 2 0     Net +1338 +1900            Unmeasured Urine Occurrence 3 x 4 x         Weights:   IBW (Ideal Body Weight): 47 8 kg    Body mass index is 37 99 kg/m²  Weight (last 2 days)     None        Physical Exam  HENT:      Head: Normocephalic and atraumatic  Mouth/Throat:      Mouth: Mucous membranes are moist    Cardiovascular:      Rate and Rhythm: Normal rate and regular rhythm  Pulses: Normal pulses  Heart sounds: Normal heart sounds  Pulmonary:      Effort: Pulmonary effort is normal       Breath sounds: Normal breath sounds  Abdominal:      General: Bowel sounds are normal       Palpations: Abdomen is soft  Skin:     General: Skin is warm and dry  Capillary Refill: Capillary refill takes less than 2 seconds  Neurological:      Mental Status: She is alert and oriented to person, place, and time  LABORATORY DATA     Labs: I have personally reviewed pertinent reports    Results from last 7 days   Lab Units 21  0608 21  1038 21  0538   WBC Thousand/uL 4 19* 4 25* 4 48   HEMOGLOBIN g/dL 12 5 12 6 12 7   HEMATOCRIT % 39 4 38 8 38 6   PLATELETS Thousands/uL 230 251 257   NEUTROS PCT % 52 67 54   MONOS PCT % 9 8 7      Results from last 7 days   Lab Units 21  0608 21  1038 21  0538   POTASSIUM mmol/L 3 7 4 0 3 2*   CHLORIDE mmol/L 110* 113* 110*   CO2 mmol/L 28 30 29   BUN mg/dL 10 11 10   CREATININE mg/dL 0 67 0 81 0 72   CALCIUM mg/dL 8 1* 7 9* 8 1*                            IMAGING & DIAGNOSTIC TESTING     Radiology Results: I have personally reviewed pertinent reports  No results found  Other Diagnostic Testing: I have personally reviewed pertinent reports      ACTIVE MEDICATIONS     Current Facility-Administered Medications   Medication Dose Route Frequency    acetaminophen (TYLENOL) tablet 650 mg  650 mg Oral Q8H [facility]    amLODIPine (NORVASC) tablet 5 mg  5 mg Oral Daily    ascorbic acid (VITAMIN C) tablet 500 mg  500 mg Oral BID    aspirin chewable tablet 81 mg  81 mg Oral Daily    atorvastatin (LIPITOR) tablet 40 mg  40 mg Oral Daily With Dinner    busPIRone (BUSPAR) tablet 15 mg  15 mg Oral TID    dextromethorphan-guaiFENesin (ROBITUSSIN DM) oral syrup 10 mL  10 mL Oral Q4H PRN    Diclofenac Sodium (VOLTAREN) 1 % topical gel 2 g  2 g Topical 4x Daily    enoxaparin (LOVENOX) subcutaneous injection 40 mg  40 mg Subcutaneous Daily    ferrous sulfate tablet 325 mg  325 mg Oral Daily With Breakfast    folic acid (FOLVITE) tablet 1,000 mcg  1,000 mcg Oral Daily    hydrOXYzine HCL (ATARAX) tablet 50 mg  50 mg Oral TID PRN    lidocaine (LIDODERM) 5 % patch 1 patch  1 patch Topical Daily    lithium carbonate (LITHOBID) CR tablet 300 mg  300 mg Oral HS    LORazepam (ATIVAN) tablet 0 5 mg  0 5 mg Oral Q8H PRN    menthol-methyl salicylate (BENGAY) 61-51 % cream   Apply externally 4x Daily PRN    methocarbamol (ROBAXIN) tablet 500 mg  500 mg Oral Q6H PRN    mirtazapine (REMERON) tablet 7 5 mg  7 5 mg Oral HS PRN    ondansetron (ZOFRAN-ODT) dispersible tablet 4 mg  4 mg Oral Q6H PRN    oxyCODONE (ROXICODONE) IR tablet 2 5 mg  2 5 mg Oral Q6H PRN    pantoprazole (PROTONIX) EC tablet 20 mg  20 mg Oral Early Morning    PARoxetine (PAXIL) tablet 60 mg  60 mg Oral Daily    polyethylene glycol (MIRALAX) packet 17 g  17 g Oral Daily    prazosin (MINIPRESS) capsule 2 mg  2 mg Oral Daily    pregabalin (LYRICA) capsule 100 mg  100 mg Oral TID    propranolol (INDERAL) tablet 20 mg  20 mg Oral BID before breakfast/lunch    QUEtiapine (SEROquel) tablet 50 mg  50 mg Oral 4x Daily (PC & HS)    senna-docusate sodium (SENOKOT S) 8 6-50 mg per tablet 1 tablet  1 tablet Oral Daily PRN    Valbenazine Tosylate CAPS 80 mg  80 mg Oral Daily       VTE Pharmacologic Prophylaxis: Enoxaparin (Lovenox)  VTE Mechanical Prophylaxis: sequential compression device    Portions of the record may have been created with voice recognition software  Occasional wrong word or "sound a like" substitutions may have occurred due to the inherent limitations of voice recognition software    Read the chart carefully and recognize, using context, where substitutions have occurred   ==  Shanda Quezada MD  [address]  Internal Medicine Residency PGY-1

## 2021-08-30 NOTE — PROGRESS NOTES
SW received call from pt who is in the hospital and relates she is waiting for a bed in a Nursing Home but they are having difficulty finding one  SW did reinforce the it is safest for her to go to a SNF as her family needed to return to work and would not be able to supervise her enough  Pt agreed and relates she is even having more difficulty walking now  Support provided  OP /CM to remain available to support/ assist as indicted

## 2021-08-30 NOTE — PLAN OF CARE
Problem: OCCUPATIONAL THERAPY ADULT  Goal: Performs self-care activities at highest level of function for planned discharge setting  See evaluation for individualized goals  Description: Treatment Interventions: ADL retraining, Functional transfer training, Endurance training, Cognitive reorientation, Patient/family training, Compensatory technique education, Continued evaluation          See flowsheet documentation for full assessment, interventions and recommendations  Note: Limitation: Decreased ADL status, Decreased Safe judgement during ADL, Decreased endurance, Decreased cognition, Decreased high-level ADLs, Decreased self-care trans  Prognosis: Good  Assessment: Patient participated in Skilled OT session this date with interventions consisting of ADL re training with the use of correct body mechnaics, Energy Conservation techniques, safety awareness and fall prevention techniques, therapeutic exercise to: increase functional use of BUEs, increase BUE muscle strength ,  therapeutic activities to: increase activity tolerance and increase OOB/ sitting tolerance   Upon arrival patient was found supine in bed  Pt demonstrated the following tasks: MI sup to sit, STS  MIN A sit to sup, S fnxl ambulation with rollator and toilet transfer  Pt performs LBD and toileting with MIN A  Pt also participated in UE therex with light resistance theraband  Patient continues to be functioning below baseline level, occupational performance remains limited secondary to factors listed above and increased risk for falls and injury  From OT standpoint, recommendation at time of d/c would be STR vs home with skilled therapy - pending progress  Patient to benefit from continued Occupational Therapy treatment while in the hospital to address deficits as defined above and maximize level of functional independence with ADLs and functional mobility  Pt was left in bed after session with all current needs met   The patient's raw score on the AM-PAC Daily Activity inpatient short form is 19, standardized score is 40 22, greater than 39 4  Patients at this level are likely to benefit from discharge to home  Please refer to the recommendation of the Occupational Therapist for safe discharge planning       OT Discharge Recommendation: Post acute rehabilitation services (vs home with skilled therapy and increased social support )  OT - OK to Discharge: Yes (when medically stable )

## 2021-08-30 NOTE — CASE MANAGEMENT
Left VM for son Venkat Lilia to discuss dc plan  Re-sent referrals to [facility] facilities again & expanded search

## 2021-08-30 NOTE — OCCUPATIONAL THERAPY NOTE
Occupational Therapy Progress Note     Patient Name: Aashish COOPER Date: 8/30/2021  Problem List  Principal Problem:    Ambulatory dysfunction  Active Problems:    Chronic pain syndrome    Esophageal reflux    Generalized anxiety disorder    Tardive dyskinesia    History of CVA (cerebrovascular accident)    Mixed hyperlipidemia            08/30/21 1030   OT Last Visit   OT Visit Date 08/30/21   Note Type   Note Type Treatment   Restrictions/Precautions   Weight Bearing Precautions Per Order No   Other Precautions Fall Risk;Pain   General   Response to Previous Treatment Patient with no complaints from previous session   Lifestyle   Autonomy pta pt reports son assists w/ ADLs/IADLs  pt reports using rollator for functional mobility PTA   Reciprocal Relationships supportive son- reports son is going back to work shortly and she will be home alone   Service to Others not currentlying working   Intrinsic Gratification enjoys watching tv   Pain Assessment   Pain Assessment Tool 0-10   Pain Score 7   Pain Location/Orientation Orientation: Bilateral  (posterior thighs )   Hospital Pain Intervention(s) Repositioned; Ambulation/increased activity   ADL   Grooming Assistance 5  Supervision/Setup   Grooming Deficit   (oral care )   Grooming Comments sitting at sink with rollator    LB Dressing Assistance 4  Minimal Assistance   LB Dressing Deficit Thread RLE into pants; Thread LLE into pants;Pull up over hips   Toileting Assistance  4  Minimal Assistance   Toileting Deficit Clothing management up   Bed Mobility   Supine to Sit 6  Modified independent   Additional items Bedrails; Increased time required   Sit to Supine 4  Minimal assistance   Additional items Assist x 1; Increased time required;LE management   Transfers   Sit to Stand 6  Modified independent   Stand to Sit 6  Modified independent   Toilet transfer 5  Supervision   Additional Comments Transfers with rollator    Functional Mobility   Functional Mobility 5  Supervision   Additional Comments [facility]    Additional items Rolling walker  (rollator)   Therapeutic Excerise-Strength   UE Strength Yes   Right Upper Extremity- Strength   R Shoulder Flexion;ABduction; Extension;Horizontal ABduction   R Elbow Elbow flexion;Elbow extension   R Position Seated   Equipment Theraband   R Weight/Reps/Sets x20   Left Upper Extremity-Strength   L Shoulder Flexion;ABduction;Horizontal ABduction   L Elbow Elbow flexion;Elbow extension   L Position Seated   Equipment Theraband   L Weights/Reps/Sets x20   Cognition   Overall Cognitive Status WFL   Arousal/Participation Alert; Responsive; Cooperative   Attention Attends with cues to redirect   Orientation Level Oriented to person;Oriented to place   Following Commands Follows one step commands without difficulty   Comments Pt pleasant and cooperative t/o session    Activity Tolerance   Activity Tolerance Patient limited by fatigue;Patient limited by pain   Medical Staff Made Aware RN clearance for session    Assessment   Assessment Patient participated in Skilled OT session this date with interventions consisting of ADL re training with the use of correct body mechnaics, Energy Conservation techniques, safety awareness and fall prevention techniques, therapeutic exercise to: increase functional use of BUEs, increase BUE muscle strength ,  therapeutic activities to: increase activity tolerance and increase OOB/ sitting tolerance   Upon arrival patient was found supine in bed  Pt demonstrated the following tasks: MI sup to sit, STS  MIN A sit to sup, S fnxl ambulation with rollator and toilet transfer  Pt performs LBD and toileting with MIN A  Pt also participated in UE therex with light resistance theraband  Patient continues to be functioning below baseline level, occupational performance remains limited secondary to factors listed above and increased risk for falls and injury     From OT standpoint, recommendation at time of d/c would be STR vs home with skilled therapy - pending progress  Patient to benefit from continued Occupational Therapy treatment while in the hospital to address deficits as defined above and maximize level of functional independence with ADLs and functional mobility  Pt was left in bed after session with all current needs met  The patient's raw score on the AM-PAC Daily Activity inpatient short form is 19, standardized score is 40 22, greater than 39 4  Patients at this level are likely to benefit from discharge to home  Please refer to the recommendation of the Occupational Therapist for safe discharge planning  Plan   Treatment Interventions ADL retraining;Functional transfer training;UE strengthening/ROM; Endurance training;Patient/family training;Equipment evaluation/education; Compensatory technique education;Continued evaluation; Energy conservation; Activityengagement   Goal Expiration Date 09/13/21  (extend poc x 14 days )   OT Treatment Day 2   OT Frequency 2-3x/wk   Recommendation   OT Discharge Recommendation Post acute rehabilitation services  (vs home with skilled therapy and increased social support )   OT - OK to Discharge Yes  (when medically stable )   AM-PAC Daily Activity Inpatient   Lower Body Dressing 3   Bathing 3   Toileting 3   Upper Body Dressing 3   Grooming 3   Eating 4   Daily Activity Raw Score 19   Daily Activity Standardized Score (Calc for Raw Score >=11) 40 22   AM-PAC Applied Cognition Inpatient   Following a Speech/Presentation 4   Understanding Ordinary Conversation 4   Taking Medications 4   Remembering Where Things Are Placed or Put Away 4   Remembering List of 4-5 Errands 4   Taking Care of Complicated Tasks 3   Applied Cognition Raw Score 23   Applied Cognition Standardized Score 53 08       Jack Leung MS, OTR/L

## 2021-08-30 NOTE — QUICK NOTE
Spoke with patient's son Mr Gabrielle Rocha about patients current disposition and that no rehab options are available  As per patient's son, he works long hours and would not be able to watch over his mother and he believes his current home layout (stairs and narrow hallways) would not be suitable for her in the future

## 2021-08-31 PROCEDURE — 99232 SBSQ HOSP IP/OBS MODERATE 35: CPT | Performed by: INTERNAL MEDICINE

## 2021-08-31 PROCEDURE — 97530 THERAPEUTIC ACTIVITIES: CPT

## 2021-08-31 PROCEDURE — 97116 GAIT TRAINING THERAPY: CPT

## 2021-08-31 PROCEDURE — 97110 THERAPEUTIC EXERCISES: CPT

## 2021-08-31 RX ADMIN — BUSPIRONE HYDROCHLORIDE 15 MG: 10 TABLET ORAL at 16:26

## 2021-08-31 RX ADMIN — ACETAMINOPHEN 650 MG: 325 TABLET, FILM COATED ORAL at 21:44

## 2021-08-31 RX ADMIN — ACETAMINOPHEN 650 MG: 325 TABLET, FILM COATED ORAL at 13:29

## 2021-08-31 RX ADMIN — PAROXETINE HYDROCHLORIDE 60 MG: 20 TABLET, FILM COATED ORAL at 21:46

## 2021-08-31 RX ADMIN — PANTOPRAZOLE SODIUM 20 MG: 20 TABLET, DELAYED RELEASE ORAL at 08:50

## 2021-08-31 RX ADMIN — LIDOCAINE 1 PATCH: 50 PATCH TOPICAL at 08:53

## 2021-08-31 RX ADMIN — PREGABALIN 100 MG: 100 CAPSULE ORAL at 16:26

## 2021-08-31 RX ADMIN — LORAZEPAM 0.5 MG: 0.5 TABLET ORAL at 08:48

## 2021-08-31 RX ADMIN — ATORVASTATIN CALCIUM 40 MG: 40 TABLET, FILM COATED ORAL at 16:25

## 2021-08-31 RX ADMIN — FOLIC ACID 1000 MCG: 1 TABLET ORAL at 08:52

## 2021-08-31 RX ADMIN — METHOCARBAMOL 500 MG: 500 TABLET, FILM COATED ORAL at 09:01

## 2021-08-31 RX ADMIN — OXYCODONE HYDROCHLORIDE AND ACETAMINOPHEN 500 MG: 500 TABLET ORAL at 08:50

## 2021-08-31 RX ADMIN — PROPRANOLOL HYDROCHLORIDE 20 MG: 20 TABLET ORAL at 16:25

## 2021-08-31 RX ADMIN — BUSPIRONE HYDROCHLORIDE 15 MG: 10 TABLET ORAL at 08:50

## 2021-08-31 RX ADMIN — OXYCODONE HYDROCHLORIDE 2.5 MG: 5 TABLET ORAL at 08:49

## 2021-08-31 RX ADMIN — PREGABALIN 100 MG: 100 CAPSULE ORAL at 21:43

## 2021-08-31 RX ADMIN — PRAZOSIN HYDROCHLORIDE 2 MG: 2 CAPSULE ORAL at 08:57

## 2021-08-31 RX ADMIN — PREGABALIN 100 MG: 100 CAPSULE ORAL at 08:51

## 2021-08-31 RX ADMIN — PROPRANOLOL HYDROCHLORIDE 20 MG: 20 TABLET ORAL at 08:56

## 2021-08-31 RX ADMIN — LORAZEPAM 0.5 MG: 0.5 TABLET ORAL at 17:39

## 2021-08-31 RX ADMIN — QUETIAPINE FUMARATE 50 MG: 25 TABLET ORAL at 17:39

## 2021-08-31 RX ADMIN — LIDOCAINE 1 PATCH: 50 PATCH TOPICAL at 09:26

## 2021-08-31 RX ADMIN — OXYCODONE HYDROCHLORIDE AND ACETAMINOPHEN 500 MG: 500 TABLET ORAL at 17:39

## 2021-08-31 RX ADMIN — ASPIRIN 81 MG CHEWABLE TABLET 81 MG: 81 TABLET CHEWABLE at 08:51

## 2021-08-31 RX ADMIN — VALBENAZINE 80 MG: 80 CAPSULE ORAL at 08:57

## 2021-08-31 RX ADMIN — QUETIAPINE FUMARATE 50 MG: 25 TABLET ORAL at 13:28

## 2021-08-31 RX ADMIN — QUETIAPINE FUMARATE 50 MG: 25 TABLET ORAL at 21:44

## 2021-08-31 RX ADMIN — QUETIAPINE FUMARATE 50 MG: 25 TABLET ORAL at 08:49

## 2021-08-31 RX ADMIN — AMLODIPINE BESYLATE 5 MG: 5 TABLET ORAL at 08:50

## 2021-08-31 RX ADMIN — BUSPIRONE HYDROCHLORIDE 15 MG: 10 TABLET ORAL at 21:43

## 2021-08-31 RX ADMIN — ENOXAPARIN SODIUM 40 MG: 40 INJECTION SUBCUTANEOUS at 08:52

## 2021-08-31 RX ADMIN — LITHIUM CARBONATE 300 MG: 300 TABLET, FILM COATED, EXTENDED RELEASE ORAL at 21:46

## 2021-08-31 RX ADMIN — OXYCODONE HYDROCHLORIDE 2.5 MG: 5 TABLET ORAL at 15:09

## 2021-08-31 RX ADMIN — FERROUS SULFATE TAB 325 MG (65 MG ELEMENTAL FE) 325 MG: 325 (65 FE) TAB at 08:51

## 2021-08-31 RX ADMIN — ACETAMINOPHEN 650 MG: 325 TABLET, FILM COATED ORAL at 08:52

## 2021-08-31 NOTE — PROGRESS NOTES
INTERNAL MEDICINE RESIDENCY PROGRESS NOTE     Name: Jamir Zelaya   Age & Sex: 60 y o  female   MRN: [id number]  Unit/Bed#: [facility] 908-01   Encounter: [id number]  Team: SOD Team A    PATIENT INFORMATION     Name: Jamir Zelaya   Age & Sex: 60 y o  female   MRN: [id number]  Hospital Stay Days: 11    ASSESSMENT/PLAN     Principal Problem:    Ambulatory dysfunction  Active Problems:    Chronic pain syndrome    Esophageal reflux    Generalized anxiety disorder    Tardive dyskinesia    History of CVA (cerebrovascular accident)    Mixed hyperlipidemia      Mixed hyperlipidemia  Assessment & Plan  -On Atorvastatin 40 mg PO qd    History of CVA (cerebrovascular accident)  Assessment & Plan  -On Aspirin 81 mg PO qd  -On Atorvastatin 40 mg PO qd    Tardive dyskinesia  Assessment & Plan  -Continue Valbenazine 80 mg daily  -appears to have slightly improved with addition of Lyrica 100 mg t i d     Generalized anxiety disorder  Assessment & Plan  Patient is on multiple psychiatric medications and was recently evaluated by inpatient psych at [facility] with adjustments to her medications including buspirone 50 mg t i d , paroxetine 60 mg daily, quetiapine 50 mg 4 times daily, mirtazapine 7 5 mg q h s , lithium 300 mg q h s  And hydroxyzine 50 mg t i d  P r n  Oanh Rm Patient continues to have increased anxiety with daily morning panic attacks  Patient also reports that her anxiety is worsened by her lack of pain control  Plan:  · Continue medical management  · Continue current psychotropic medication  · She is psychiatrically cleared to go to SNF  · Patient could benefit from outpatient psychiatric evaluation and follow-up  · Confirmed psych medication regimen following discharge from [address] [place] [address] as noted above  Esophageal reflux  Assessment & Plan  Continue Protonix 20 mg daily  Chronic pain syndrome  Assessment & Plan  Patient has been on chronic opioids for low back and leg pain    She was seen in the clinic and discussed opioid tapering  Most recent opioid prescription per PDMP was for morphine 15 mg for 10 days (20 tablets)  Per chart review, concern for polypharmacy and suspicion of drug-seeking  · On Tylenol 650 mg q8h  · Lidocaine patch/Voltaren gel  · On Lyrica 100 mg  t i d   · Oxy IR 2 5 mg q6h prn  · Bengay topical ointment 4 times daily PRN  · 8/21 Added Aqua K   · 8/27 Added Robaxin  500 mg q 6h p r n     * Ambulatory dysfunction  Assessment & Plan  Patient with decreased physical mobility, at risk for falls  She uses walker and wheelchair at home  Presented to the ED requesting placement to SNF  · Encourage good body mechanics and assist with transfers  · Keep personal items and call bell close to the patient  · PT and OT- recommends post acute rehab  · Patient is pending placement-case management consult    Cough-resolved as of 8/30/2021  Assessment & Plan  Patient complains of progressive worsening cough from 08/25 to 8/26  Cough is nonproductive  Patient also reports associated chills, nasal congestion, and nausea  -COVID PCR negative  -chest x-ray unremarkable  -Robitussin for cough  -continue to trend white count and fevers    -resolved as of 8/29        Disposition: Patient is medically stable, on psychotropic medications along with appropriate pain control for her back pain  We will follow up with case management for placement for this patient  SUBJECTIVE     Patient seen and examined  No acute events overnight  Patient reports sleeping well overnight  Patient denies any more cough, chills, nasal congestion  Patient denies any fevers, chest pain, shortness of breath, nausea, vomiting, diarrhea  She states that she had back pain and felt mildly anxious overnight  Patient otherwise denies any questions or concerns at this time      OBJECTIVE     Vitals:    08/30/21 0752 08/30/21 1145 08/30/21 1439 08/30/21 2230   BP: 121/80 134/85 137/84 (!) 151/103   Pulse: 61 64 76 82   Resp:  16 16 20   Temp: 97 9 °F (36 6 °C) 98 1 °F (36 7 °C) 98 2 °F (36 8 °C) 97 9 °F (36 6 °C)   TempSrc:       SpO2: 98% 98% 98% 97%   Weight:       Height:          Temperature:   Temp (24hrs), Av °F (36 7 °C), Min:97 9 °F (36 6 °C), Max:98 2 °F (36 8 °C)    Temperature: 97 9 °F (36 6 °C)  Intake & Output:  I/O        07 -  0700 701 -  0700    P  O  1900 600    Total Intake(mL/kg) 1900 (20 8) 600 (6 6)    Urine (mL/kg/hr) 0 (0) 1700 (0 8)    Total Output 0 1700    Net +1900 -1100          Unmeasured Urine Occurrence 4 x 1 x    Unmeasured Stool Occurrence  1 x        Weights:   IBW (Ideal Body Weight): 47 8 kg    Body mass index is 37 99 kg/m²  Weight (last 2 days)     None        Physical Exam  Constitutional:       Appearance: Normal appearance  HENT:      Head: Normocephalic and atraumatic  Mouth/Throat:      Mouth: Mucous membranes are moist    Cardiovascular:      Rate and Rhythm: Normal rate and regular rhythm  Abdominal:      General: Bowel sounds are normal       Palpations: Abdomen is soft  Skin:     General: Skin is warm and dry  Capillary Refill: Capillary refill takes less than 2 seconds  Neurological:      Mental Status: She is alert  LABORATORY DATA     Labs: I have personally reviewed pertinent reports  Results from last 7 days   Lab Units 21  0608 21  1038 21  0538   WBC Thousand/uL 4 19* 4 25* 4 48   HEMOGLOBIN g/dL 12 5 12 6 12 7   HEMATOCRIT % 39 4 38 8 38 6   PLATELETS Thousands/uL 230 251 257   NEUTROS PCT % 52 67 54   MONOS PCT % 9 8 7      Results from last 7 days   Lab Units 21  0608 21  1038 21  0538   POTASSIUM mmol/L 3 7 4 0 3 2*   CHLORIDE mmol/L 110* 113* 110*   CO2 mmol/L 28 30 29   BUN mg/dL 10 11 10   CREATININE mg/dL 0 67 0 81 0 72   CALCIUM mg/dL 8 1* 7 9* 8 1*                            IMAGING & DIAGNOSTIC TESTING     Radiology Results: I have personally reviewed pertinent reports    No results found  Other Diagnostic Testing: I have personally reviewed pertinent reports      ACTIVE MEDICATIONS     Current Facility-Administered Medications   Medication Dose Route Frequency    acetaminophen (TYLENOL) tablet 650 mg  650 mg Oral Q8H [facility]    amLODIPine (NORVASC) tablet 5 mg  5 mg Oral Daily    ascorbic acid (VITAMIN C) tablet 500 mg  500 mg Oral BID    aspirin chewable tablet 81 mg  81 mg Oral Daily    atorvastatin (LIPITOR) tablet 40 mg  40 mg Oral Daily With Dinner    busPIRone (BUSPAR) tablet 15 mg  15 mg Oral TID    dextromethorphan-guaiFENesin (ROBITUSSIN DM) oral syrup 10 mL  10 mL Oral Q4H PRN    Diclofenac Sodium (VOLTAREN) 1 % topical gel 2 g  2 g Topical 4x Daily    enoxaparin (LOVENOX) subcutaneous injection 40 mg  40 mg Subcutaneous Daily    ferrous sulfate tablet 325 mg  325 mg Oral Daily With Breakfast    folic acid (FOLVITE) tablet 1,000 mcg  1,000 mcg Oral Daily    hydrOXYzine HCL (ATARAX) tablet 50 mg  50 mg Oral TID PRN    lidocaine (LIDODERM) 5 % patch 1 patch  1 patch Topical Daily    lidocaine (LIDODERM) 5 % patch 1 patch  1 patch Topical Daily    lithium carbonate (LITHOBID) CR tablet 300 mg  300 mg Oral HS    LORazepam (ATIVAN) tablet 0 5 mg  0 5 mg Oral Q8H PRN    menthol-methyl salicylate (BENGAY) 97-76 % cream   Apply externally 4x Daily PRN    methocarbamol (ROBAXIN) tablet 500 mg  500 mg Oral Q6H PRN    mirtazapine (REMERON) tablet 7 5 mg  7 5 mg Oral HS PRN    ondansetron (ZOFRAN-ODT) dispersible tablet 4 mg  4 mg Oral Q6H PRN    oxyCODONE (ROXICODONE) IR tablet 2 5 mg  2 5 mg Oral Q6H PRN    pantoprazole (PROTONIX) EC tablet 20 mg  20 mg Oral Early Morning    PARoxetine (PAXIL) tablet 60 mg  60 mg Oral Daily    polyethylene glycol (MIRALAX) packet 17 g  17 g Oral Daily    prazosin (MINIPRESS) capsule 2 mg  2 mg Oral Daily    pregabalin (LYRICA) capsule 100 mg  100 mg Oral TID    propranolol (INDERAL) tablet 20 mg  20 mg Oral BID before breakfast/lunch    QUEtiapine (SEROquel) tablet 50 mg  50 mg Oral 4x Daily (PC & HS)    senna-docusate sodium (SENOKOT S) 8 6-50 mg per tablet 1 tablet  1 tablet Oral Daily PRN    Valbenazine Tosylate CAPS 80 mg  80 mg Oral Daily       VTE Pharmacologic Prophylaxis: Enoxaparin (Lovenox)  VTE Mechanical Prophylaxis: sequential compression device    Portions of the record may have been created with voice recognition software  Occasional wrong word or "sound a like" substitutions may have occurred due to the inherent limitations of voice recognition software    Read the chart carefully and recognize, using context, where substitutions have occurred   ==  Shanda Quezada MD  [facility]  Internal Medicine Residency PGY-1

## 2021-08-31 NOTE — CASE MANAGEMENT
S/w SOD A for care coordination  They will s/w son to arrange for family meeting tbd for dc planning  Still no accepting facility  Additional referrals were placed & awaiting determination

## 2021-08-31 NOTE — PHYSICAL THERAPY NOTE
Physical Therapy Treatment Note     08/31/21 1430   PT Last Visit   PT Visit Date 08/31/21   Note Type   Note Type Treatment   Pain Assessment   Pain Assessment Tool 0-10   Pain Score 7   Pain Location/Orientation Location: Back;Orientation: Lower   Restrictions/Precautions   Weight Bearing Precautions Per Order No   Other Precautions Fall Risk;Pain   General   Chart Reviewed Yes   Family/Caregiver Present No   Cognition   Overall Cognitive Status WFL   Subjective   Subjective pt  sleeping S/L on R upon entry  Agreeable to PT  Bed Mobility   Supine to Sit 6  Modified independent   Additional items Bedrails; Increased time required   Sit to Supine 6  Modified independent   Additional items Bedrails; Increased time required   Transfers   Sit to Stand 6  Modified independent   Additional items Armrests   Stand to Sit 6  Modified independent   Additional items Armrests   Stand pivot 6  Modified independent   Ambulation/Elevation   Gait pattern Forward Flexion;Decreased foot clearance; Excessively slow; Short stride; Foward flexed; Inconsistent zack   Gait Assistance 4  Minimal assist   Additional items Assist x 1;Verbal cues   Assistive Device Rolling walker   Distance 30ft, 20ft, 50ft   Stair Management Assistance 4  Minimal assist   Additional items Assist x 1; Increased time required   Stair Management Technique One rail L;Step to pattern; Foreward;Nonreciprocal  (R HHA)   Number of Stairs 7   Balance   Static Sitting Good   Ambulatory Fair -   Endurance Deficit   Endurance Deficit Yes   Activity Tolerance   Activity Tolerance Patient tolerated treatment well;Patient limited by fatigue;Patient limited by pain   Nurse Made Aware Yes   Exercises   THR Supine;Sitting;Bilateral;AROM;20 reps  (SLR, HS, Hip Abd, quad sets, LAQ)   Assessment   Prognosis Fair   Problem List Decreased strength;Decreased range of motion;Decreased endurance;Decreased mobility;Pain   Assessment Pt  progressing well with mobility   Pt  noted with decreased endurance however improved tolerance to activities as session progressed which was evident from decreased seated rest between ambulation  pt  reported the steps she needs to negotiate at home do not have any handrail but she holds onto her son for the same, Pt  needed no hands on assist for transfers  CGA/CS provided for Ambulation  recommend using RW at this time for mobility  Talked to attending PT Lyssa Bernal and agreed patient is a possible home with home PT if she continue to progress with mobility vs rehab  Will conitnue to progress patient as able  Barriers to Discharge Inaccessible home environment;Decreased caregiver support   Goals   Patient Goals None reported   STG Expiration Date 09/04/21   PT Treatment Day 5   Plan   Treatment/Interventions Functional transfer training;LE strengthening/ROM; Elevations; Therapeutic exercise; Endurance training;Gait training;Bed mobility; Equipment eval/education;Patient/family training;Spoke to nursing  (PT)   Progress Progressing toward goals   PT Frequency   (3-5x/week)   Recommendation   PT Discharge Recommendation Post acute rehabilitation services   Equipment Recommended Walker  (vs HHPT pending progress)   Walker Package Recommended Wheeled walker   PT - OK to Discharge Yes   AM-PAC Basic Mobility Inpatient   Turning in Bed Without Bedrails 3   Lying on Back to Sitting on Edge of Flat Bed 4   Moving Bed to Chair 3   Standing Up From Chair 4   Walk in Room 3   Climb 3-5 Stairs 3   Basic Mobility Inpatient Raw Score 20   Basic Mobility Standardized Score 43 99         Chris Vera, PTA

## 2021-09-01 PROCEDURE — 99232 SBSQ HOSP IP/OBS MODERATE 35: CPT | Performed by: INTERNAL MEDICINE

## 2021-09-01 RX ADMIN — FOLIC ACID 1000 MCG: 1 TABLET ORAL at 07:31

## 2021-09-01 RX ADMIN — BUSPIRONE HYDROCHLORIDE 15 MG: 10 TABLET ORAL at 16:51

## 2021-09-01 RX ADMIN — ASPIRIN 81 MG CHEWABLE TABLET 81 MG: 81 TABLET CHEWABLE at 07:30

## 2021-09-01 RX ADMIN — OXYCODONE HYDROCHLORIDE 2.5 MG: 5 TABLET ORAL at 14:50

## 2021-09-01 RX ADMIN — MIRTAZAPINE 7.5 MG: 15 TABLET, FILM COATED ORAL at 22:55

## 2021-09-01 RX ADMIN — AMLODIPINE BESYLATE 5 MG: 5 TABLET ORAL at 07:31

## 2021-09-01 RX ADMIN — ACETAMINOPHEN 650 MG: 325 TABLET, FILM COATED ORAL at 21:14

## 2021-09-01 RX ADMIN — PAROXETINE HYDROCHLORIDE 60 MG: 20 TABLET, FILM COATED ORAL at 21:14

## 2021-09-01 RX ADMIN — ENOXAPARIN SODIUM 40 MG: 40 INJECTION SUBCUTANEOUS at 07:30

## 2021-09-01 RX ADMIN — OXYCODONE HYDROCHLORIDE AND ACETAMINOPHEN 500 MG: 500 TABLET ORAL at 16:51

## 2021-09-01 RX ADMIN — METHOCARBAMOL 500 MG: 500 TABLET, FILM COATED ORAL at 07:28

## 2021-09-01 RX ADMIN — OXYCODONE HYDROCHLORIDE 2.5 MG: 5 TABLET ORAL at 07:28

## 2021-09-01 RX ADMIN — PROPRANOLOL HYDROCHLORIDE 20 MG: 20 TABLET ORAL at 11:18

## 2021-09-01 RX ADMIN — QUETIAPINE FUMARATE 50 MG: 25 TABLET ORAL at 07:31

## 2021-09-01 RX ADMIN — FERROUS SULFATE TAB 325 MG (65 MG ELEMENTAL FE) 325 MG: 325 (65 FE) TAB at 07:31

## 2021-09-01 RX ADMIN — LORAZEPAM 0.5 MG: 0.5 TABLET ORAL at 07:28

## 2021-09-01 RX ADMIN — ACETAMINOPHEN 650 MG: 325 TABLET, FILM COATED ORAL at 05:28

## 2021-09-01 RX ADMIN — LORAZEPAM 0.5 MG: 0.5 TABLET ORAL at 16:51

## 2021-09-01 RX ADMIN — PREGABALIN 100 MG: 100 CAPSULE ORAL at 16:51

## 2021-09-01 RX ADMIN — BUSPIRONE HYDROCHLORIDE 15 MG: 10 TABLET ORAL at 21:14

## 2021-09-01 RX ADMIN — MENTHOL, METHYL SALICYLATE: 10; 15 CREAM TOPICAL at 11:31

## 2021-09-01 RX ADMIN — PANTOPRAZOLE SODIUM 20 MG: 20 TABLET, DELAYED RELEASE ORAL at 05:28

## 2021-09-01 RX ADMIN — QUETIAPINE FUMARATE 50 MG: 25 TABLET ORAL at 11:19

## 2021-09-01 RX ADMIN — PREGABALIN 100 MG: 100 CAPSULE ORAL at 07:30

## 2021-09-01 RX ADMIN — ATORVASTATIN CALCIUM 40 MG: 40 TABLET, FILM COATED ORAL at 16:52

## 2021-09-01 RX ADMIN — QUETIAPINE FUMARATE 50 MG: 25 TABLET ORAL at 16:52

## 2021-09-01 RX ADMIN — VALBENAZINE 80 MG: 80 CAPSULE ORAL at 07:33

## 2021-09-01 RX ADMIN — LIDOCAINE 1 PATCH: 50 PATCH TOPICAL at 07:43

## 2021-09-01 RX ADMIN — DICLOFENAC SODIUM 2 G: 10 GEL TOPICAL at 22:09

## 2021-09-01 RX ADMIN — ACETAMINOPHEN 650 MG: 325 TABLET, FILM COATED ORAL at 14:50

## 2021-09-01 RX ADMIN — PRAZOSIN HYDROCHLORIDE 2 MG: 2 CAPSULE ORAL at 07:33

## 2021-09-01 RX ADMIN — LITHIUM CARBONATE 300 MG: 300 TABLET, FILM COATED, EXTENDED RELEASE ORAL at 22:09

## 2021-09-01 RX ADMIN — QUETIAPINE FUMARATE 50 MG: 25 TABLET ORAL at 21:14

## 2021-09-01 RX ADMIN — OXYCODONE HYDROCHLORIDE AND ACETAMINOPHEN 500 MG: 500 TABLET ORAL at 07:32

## 2021-09-01 RX ADMIN — LIDOCAINE 1 PATCH: 50 PATCH TOPICAL at 07:29

## 2021-09-01 RX ADMIN — PREGABALIN 100 MG: 100 CAPSULE ORAL at 21:15

## 2021-09-01 RX ADMIN — BUSPIRONE HYDROCHLORIDE 15 MG: 10 TABLET ORAL at 07:32

## 2021-09-01 NOTE — PROGRESS NOTES
INTERNAL MEDICINE RESIDENCY PROGRESS NOTE     Name: Malina Curtis   Age & Sex: 60 y o  female   MRN: [id number]  Unit/Bed#: [facility] 908-01   Encounter: [id number]  Team: SOD Team A    PATIENT INFORMATION     Name: Malina Curtis   Age & Sex: 60 y o  female   MRN: [id number]  Hospital Stay Days: 12    ASSESSMENT/PLAN     Principal Problem:    Ambulatory dysfunction  Active Problems:    Chronic pain syndrome    Esophageal reflux    Generalized anxiety disorder    Tardive dyskinesia    History of CVA (cerebrovascular accident)    Mixed hyperlipidemia      Mixed hyperlipidemia  Assessment & Plan  -On Atorvastatin 40 mg PO qd    History of CVA (cerebrovascular accident)  Assessment & Plan  -On Aspirin 81 mg PO qd  -On Atorvastatin 40 mg PO qd    Tardive dyskinesia  Assessment & Plan  -Continue Valbenazine 80 mg daily  -appears to have slightly improved with addition of Lyrica 100 mg t i d     Generalized anxiety disorder  Assessment & Plan  Patient is on multiple psychiatric medications and was recently evaluated by inpatient psych at [facility] with adjustments to her medications including buspirone 50 mg t i d , paroxetine 60 mg daily, quetiapine 50 mg 4 times daily, mirtazapine 7 5 mg q h s , lithium 300 mg q h s  And hydroxyzine 50 mg t i d  P r n  Fabiana Beyer Patient continues to have increased anxiety with daily morning panic attacks  Patient also reports that her anxiety is worsened by her lack of pain control  Plan:  · Continue medical management  · Continue current psychotropic medication  · She is psychiatrically cleared to go to SNF  · Patient could benefit from outpatient psychiatric evaluation and follow-up  · Confirmed psych medication regimen following discharge from [address] [place] [address] as noted above  Esophageal reflux  Assessment & Plan  Continue Protonix 20 mg daily  Chronic pain syndrome  Assessment & Plan  Patient has been on chronic opioids for low back and leg pain    She was seen in the clinic and discussed opioid tapering  Most recent opioid prescription per PDMP was for morphine 15 mg for 10 days (20 tablets)  Per chart review, concern for polypharmacy and suspicion of drug-seeking  · On Tylenol 650 mg q8h  · Lidocaine patch/Voltaren gel  · On Lyrica 100 mg  t i d   · Oxy IR 2 5 mg q6h prn  · Bengay topical ointment 4 times daily PRN  · 8/21 Added Aqua K   · 8/27 Added Robaxin  500 mg q 6h p r n     * Ambulatory dysfunction  Assessment & Plan  Patient with decreased physical mobility, at risk for falls  She uses walker and wheelchair at home  Presented to the ED requesting placement to SNF  · Encourage good body mechanics and assist with transfers  · Keep personal items and call bell close to the patient  · PT and OT- recommends post acute rehab  · Patient is pending placement-case management consult    Cough-resolved as of 8/30/2021  Assessment & Plan  Patient complains of progressive worsening cough from 08/25 to 8/26  Cough is nonproductive  Patient also reports associated chills, nasal congestion, and nausea  -COVID PCR negative  -chest x-ray unremarkable  -Robitussin for cough  -continue to trend white count and fevers    -resolved as of 8/29        Disposition: Patient is medically stable, on psychotropic medications along with appropriate pain control for her back pain  We will follow up with case management for placement for this patient  SUBJECTIVE     Patient seen and examined  No acute events overnight  Patient reports sleeping well overnight  Patient denies any more cough, chills, nasal congestion  Patient denies any fevers, chest pain, shortness of breath, nausea, vomiting, diarrhea  She states that she had back pain and reports that her anxiety has improved since yesterday  Patient otherwise denies any questions or concerns at this time      OBJECTIVE     Vitals:    08/31/21 0758 08/31/21 1602 08/31/21 2300 08/31/21 2325   BP: 121/85 114/67  113/68 Pulse: 79 62  64   Resp: 16   17   Temp: 98 3 °F (36 8 °C) 97 9 °F (36 6 °C)     TempSrc:       SpO2: 100% 99% 98% 98%   Weight:       Height:          Temperature:   Temp (24hrs), Av 1 °F (36 7 °C), Min:97 9 °F (36 6 °C), Max:98 3 °F (36 8 °C)    Temperature: 97 9 °F (36 6 °C)  Intake & Output:  I/O       701 -  0700  07 -  0700    P  O  600 120    Total Intake(mL/kg) 600 (6 6) 120 (1 3)    Urine (mL/kg/hr) 1700 (0 8)     Total Output 1700     Net -1100 +120          Unmeasured Urine Occurrence 1 x     Unmeasured Stool Occurrence 1 x 1 x        Weights:   IBW (Ideal Body Weight): 47 8 kg    Body mass index is 37 99 kg/m²  Weight (last 2 days)     None        Physical Exam  HENT:      Head: Normocephalic and atraumatic  Cardiovascular:      Rate and Rhythm: Normal rate and regular rhythm  Pulses: Normal pulses  Heart sounds: Normal heart sounds  Pulmonary:      Effort: Pulmonary effort is normal       Breath sounds: Normal breath sounds  Abdominal:      General: Bowel sounds are normal       Palpations: Abdomen is soft  Skin:     General: Skin is warm and dry  Capillary Refill: Capillary refill takes less than 2 seconds  Neurological:      Mental Status: She is alert and oriented to person, place, and time  LABORATORY DATA     Labs: I have personally reviewed pertinent reports    Results from last 7 days   Lab Units 21  0608 21  1038 21  0538   WBC Thousand/uL 4 19* 4 25* 4 48   HEMOGLOBIN g/dL 12 5 12 6 12 7   HEMATOCRIT % 39 4 38 8 38 6   PLATELETS Thousands/uL 230 251 257   NEUTROS PCT % 52 67 54   MONOS PCT % 9 8 7      Results from last 7 days   Lab Units 21  0608 21  1038 21  0538   POTASSIUM mmol/L 3 7 4 0 3 2*   CHLORIDE mmol/L 110* 113* 110*   CO2 mmol/L 28 30 29   BUN mg/dL 10 11 10   CREATININE mg/dL 0 67 0 81 0 72   CALCIUM mg/dL 8 1* 7 9* 8 1*                            IMAGING & DIAGNOSTIC TESTING Radiology Results: I have personally reviewed pertinent reports  No results found  Other Diagnostic Testing: I have personally reviewed pertinent reports      ACTIVE MEDICATIONS     Current Facility-Administered Medications   Medication Dose Route Frequency    acetaminophen (TYLENOL) tablet 650 mg  650 mg Oral Q8H [facility]    amLODIPine (NORVASC) tablet 5 mg  5 mg Oral Daily    ascorbic acid (VITAMIN C) tablet 500 mg  500 mg Oral BID    aspirin chewable tablet 81 mg  81 mg Oral Daily    atorvastatin (LIPITOR) tablet 40 mg  40 mg Oral Daily With Dinner    busPIRone (BUSPAR) tablet 15 mg  15 mg Oral TID    dextromethorphan-guaiFENesin (ROBITUSSIN DM) oral syrup 10 mL  10 mL Oral Q4H PRN    Diclofenac Sodium (VOLTAREN) 1 % topical gel 2 g  2 g Topical 4x Daily    enoxaparin (LOVENOX) subcutaneous injection 40 mg  40 mg Subcutaneous Daily    ferrous sulfate tablet 325 mg  325 mg Oral Daily With Breakfast    folic acid (FOLVITE) tablet 1,000 mcg  1,000 mcg Oral Daily    hydrOXYzine HCL (ATARAX) tablet 50 mg  50 mg Oral TID PRN    lidocaine (LIDODERM) 5 % patch 1 patch  1 patch Topical Daily    lidocaine (LIDODERM) 5 % patch 1 patch  1 patch Topical Daily    lithium carbonate (LITHOBID) CR tablet 300 mg  300 mg Oral HS    LORazepam (ATIVAN) tablet 0 5 mg  0 5 mg Oral Q8H PRN    menthol-methyl salicylate (BENGAY) 58-50 % cream   Apply externally 4x Daily PRN    methocarbamol (ROBAXIN) tablet 500 mg  500 mg Oral Q6H PRN    mirtazapine (REMERON) tablet 7 5 mg  7 5 mg Oral HS PRN    ondansetron (ZOFRAN-ODT) dispersible tablet 4 mg  4 mg Oral Q6H PRN    oxyCODONE (ROXICODONE) IR tablet 2 5 mg  2 5 mg Oral Q6H PRN    pantoprazole (PROTONIX) EC tablet 20 mg  20 mg Oral Early Morning    PARoxetine (PAXIL) tablet 60 mg  60 mg Oral Daily    polyethylene glycol (MIRALAX) packet 17 g  17 g Oral Daily    prazosin (MINIPRESS) capsule 2 mg  2 mg Oral Daily    pregabalin (LYRICA) capsule 100 mg  100 mg Oral TID  propranolol (INDERAL) tablet 20 mg  20 mg Oral BID before breakfast/lunch    QUEtiapine (SEROquel) tablet 50 mg  50 mg Oral 4x Daily (PC & HS)    senna-docusate sodium (SENOKOT S) 8 6-50 mg per tablet 1 tablet  1 tablet Oral Daily PRN    Valbenazine Tosylate CAPS 80 mg  80 mg Oral Daily       VTE Pharmacologic Prophylaxis: Enoxaparin (Lovenox)  VTE Mechanical Prophylaxis: sequential compression device    Portions of the record may have been created with voice recognition software  Occasional wrong word or "sound a like" substitutions may have occurred due to the inherent limitations of voice recognition software    Read the chart carefully and recognize, using context, where substitutions have occurred   ==  Nayla Hutchison MD  [address]  Internal Medicine Residency PGY-1

## 2021-09-01 NOTE — PLAN OF CARE
Problem: Potential for Falls  Goal: Patient will remain free of falls  Description: INTERVENTIONS:  - Educate patient/family on patient safety including physical limitations  - Instruct patient to call for assistance with activity   - Consult OT/PT to assist with strengthening/mobility   - Keep Call bell within reach  - Keep bed low and locked with side rails adjusted as appropriate  - Keep care items and personal belongings within reach  - Initiate and maintain comfort rounds  - Make Fall Risk Sign visible to staff  - Offer Toileting every 2 Hours, in advance of need  - Initiate/Maintain alarm  - Obtain necessary fall risk management equipment:   - Apply yellow socks and bracelet for high fall risk patients  - Consider moving patient to room near nurses station  Outcome: Progressing     Problem: MOBILITY - ADULT  Goal: Maintain or return to baseline ADL function  Description: INTERVENTIONS:  -  Assess patient's ability to carry out ADLs; assess patient's baseline for ADL function and identify physical deficits which impact ability to perform ADLs (bathing, care of mouth/teeth, toileting, grooming, dressing, etc )  - Assess/evaluate cause of self-care deficits   - Assess range of motion  - Assess patient's mobility; develop plan if impaired  - Assess patient's need for assistive devices and provide as appropriate  - Encourage maximum independence but intervene and supervise when necessary  - Involve family in performance of ADLs  - Assess for home care needs following discharge   - Consider OT consult to assist with ADL evaluation and planning for discharge  - Provide patient education as appropriate  Outcome: Progressing  Goal: Maintains/Returns to pre admission functional level  Description: INTERVENTIONS:  - Perform BMAT or MOVE assessment daily    - Set and communicate daily mobility goal to care team and patient/family/caregiver     - Collaborate with rehabilitation services on mobility goals if consulted  - Perform Range of Motion 3 times a day  - Reposition patient every 2 hours    - Dangle patient 3 times a day  - Stand patient 3 times a day  - Ambulate patient 3 times a day  - Out of bed to chair 3 times a day   - Out of bed for meals 3 times a day  - Out of bed for toileting  - Record patient progress and toleration of activity level   Outcome: Progressing

## 2021-09-01 NOTE — RESTORATIVE TECHNICIAN NOTE
Restorative Technician Note      Patient Name: Samantha Pearson     Note Type: Mobility  Patient Position Upon Consult: Supine  Activity Performed: Ambulated;Dangled;Stood  Assistive Device: Roller walker  Education Provided: Yes (Educated/encouraged pt to ambulate with assistance 3-4 x's/day )  Patient Position at End of Consult: Bedside chair; All needs within reach;Bed/Chair alarm activated    Eden WILLARD, Restorative Technician, [employer]

## 2021-09-02 LAB — GLUCOSE SERPL-MCNC: 97 MG/DL (ref 65–140)

## 2021-09-02 PROCEDURE — 97129 THER IVNTJ 1ST 15 MIN: CPT

## 2021-09-02 PROCEDURE — 97130 THER IVNTJ EA ADDL 15 MIN: CPT

## 2021-09-02 PROCEDURE — 97530 THERAPEUTIC ACTIVITIES: CPT

## 2021-09-02 PROCEDURE — 97116 GAIT TRAINING THERAPY: CPT

## 2021-09-02 PROCEDURE — 82948 REAGENT STRIP/BLOOD GLUCOSE: CPT

## 2021-09-02 PROCEDURE — 99232 SBSQ HOSP IP/OBS MODERATE 35: CPT | Performed by: INTERNAL MEDICINE

## 2021-09-02 RX ADMIN — PAROXETINE HYDROCHLORIDE 60 MG: 20 TABLET, FILM COATED ORAL at 21:10

## 2021-09-02 RX ADMIN — ENOXAPARIN SODIUM 40 MG: 40 INJECTION SUBCUTANEOUS at 09:43

## 2021-09-02 RX ADMIN — PROPRANOLOL HYDROCHLORIDE 20 MG: 20 TABLET ORAL at 09:44

## 2021-09-02 RX ADMIN — OXYCODONE HYDROCHLORIDE 2.5 MG: 5 TABLET ORAL at 09:42

## 2021-09-02 RX ADMIN — PREGABALIN 100 MG: 100 CAPSULE ORAL at 16:40

## 2021-09-02 RX ADMIN — PRAZOSIN HYDROCHLORIDE 2 MG: 2 CAPSULE ORAL at 09:43

## 2021-09-02 RX ADMIN — OXYCODONE HYDROCHLORIDE AND ACETAMINOPHEN 500 MG: 500 TABLET ORAL at 18:46

## 2021-09-02 RX ADMIN — GUAIFENESIN AND DEXTROMETHORPHAN 10 ML: 100; 10 SYRUP ORAL at 09:50

## 2021-09-02 RX ADMIN — BUSPIRONE HYDROCHLORIDE 15 MG: 10 TABLET ORAL at 21:11

## 2021-09-02 RX ADMIN — PREGABALIN 100 MG: 100 CAPSULE ORAL at 09:46

## 2021-09-02 RX ADMIN — FERROUS SULFATE TAB 325 MG (65 MG ELEMENTAL FE) 325 MG: 325 (65 FE) TAB at 09:44

## 2021-09-02 RX ADMIN — LORAZEPAM 0.5 MG: 0.5 TABLET ORAL at 21:11

## 2021-09-02 RX ADMIN — DICLOFENAC SODIUM 2 G: 10 GEL TOPICAL at 16:43

## 2021-09-02 RX ADMIN — DICLOFENAC SODIUM 2 G: 10 GEL TOPICAL at 09:50

## 2021-09-02 RX ADMIN — QUETIAPINE FUMARATE 50 MG: 25 TABLET ORAL at 09:46

## 2021-09-02 RX ADMIN — PROPRANOLOL HYDROCHLORIDE 20 MG: 20 TABLET ORAL at 12:49

## 2021-09-02 RX ADMIN — METHOCARBAMOL 500 MG: 500 TABLET, FILM COATED ORAL at 16:40

## 2021-09-02 RX ADMIN — LITHIUM CARBONATE 300 MG: 300 TABLET, FILM COATED, EXTENDED RELEASE ORAL at 21:11

## 2021-09-02 RX ADMIN — BUSPIRONE HYDROCHLORIDE 15 MG: 10 TABLET ORAL at 09:44

## 2021-09-02 RX ADMIN — ASPIRIN 81 MG CHEWABLE TABLET 81 MG: 81 TABLET CHEWABLE at 09:45

## 2021-09-02 RX ADMIN — PANTOPRAZOLE SODIUM 20 MG: 20 TABLET, DELAYED RELEASE ORAL at 05:17

## 2021-09-02 RX ADMIN — OXYCODONE HYDROCHLORIDE 2.5 MG: 5 TABLET ORAL at 16:40

## 2021-09-02 RX ADMIN — QUETIAPINE FUMARATE 50 MG: 25 TABLET ORAL at 12:49

## 2021-09-02 RX ADMIN — OXYCODONE HYDROCHLORIDE AND ACETAMINOPHEN 500 MG: 500 TABLET ORAL at 09:46

## 2021-09-02 RX ADMIN — LORAZEPAM 0.5 MG: 0.5 TABLET ORAL at 09:45

## 2021-09-02 RX ADMIN — LORAZEPAM 0.5 MG: 0.5 TABLET ORAL at 01:23

## 2021-09-02 RX ADMIN — AMLODIPINE BESYLATE 5 MG: 5 TABLET ORAL at 09:44

## 2021-09-02 RX ADMIN — FOLIC ACID 1000 MCG: 1 TABLET ORAL at 09:46

## 2021-09-02 RX ADMIN — ACETAMINOPHEN 650 MG: 325 TABLET, FILM COATED ORAL at 05:17

## 2021-09-02 RX ADMIN — QUETIAPINE FUMARATE 50 MG: 25 TABLET ORAL at 21:11

## 2021-09-02 RX ADMIN — ACETAMINOPHEN 650 MG: 325 TABLET, FILM COATED ORAL at 21:11

## 2021-09-02 RX ADMIN — BUSPIRONE HYDROCHLORIDE 15 MG: 10 TABLET ORAL at 16:40

## 2021-09-02 RX ADMIN — VALBENAZINE 80 MG: 80 CAPSULE ORAL at 09:47

## 2021-09-02 RX ADMIN — PREGABALIN 100 MG: 100 CAPSULE ORAL at 21:11

## 2021-09-02 RX ADMIN — OXYCODONE HYDROCHLORIDE AND ACETAMINOPHEN 500 MG: 500 TABLET ORAL at 16:40

## 2021-09-02 RX ADMIN — ATORVASTATIN CALCIUM 40 MG: 40 TABLET, FILM COATED ORAL at 16:40

## 2021-09-02 RX ADMIN — QUETIAPINE FUMARATE 50 MG: 25 TABLET ORAL at 16:40

## 2021-09-02 NOTE — PLAN OF CARE
Problem: OCCUPATIONAL THERAPY ADULT  Goal: Performs self-care activities at highest level of function for planned discharge setting  See evaluation for individualized goals  Description: Treatment Interventions: ADL retraining, Functional transfer training, Endurance training, Cognitive reorientation, Patient/family training, Compensatory technique education, Continued evaluation          See flowsheet documentation for full assessment, interventions and recommendations  Outcome: Progressing  Note: Limitation: Decreased ADL status, Decreased Safe judgement during ADL, Decreased endurance, Decreased cognition, Decreased high-level ADLs, Decreased self-care trans  Prognosis: Good  Assessment: Pt seen for skilled OT tx session  Pt performed sup to sit with S, CGA for sit to sup, S for STS transfers and fnxl ambulation with rollator  S for sock mgmt  Pt also participated in [address]  Pt scored 3 6 indicating 24/7 supervision is recommended  However, post assessment, pt reports feeling distracted 2* increased social issues  Easily frustrated, poor attention to directions/task at hand, demonstrates increased anxiety during session  Please see further details regarding score below  OT will continue to follow and further assess fnxl/cognitive impairments  From OT standpoint, recommendation would be home with 24/7S and skilled therapy  Pt was left in bed after session with all current needs met        OT Discharge Recommendation: Home with home health rehabilitation (+ 24/7 supervision )  OT - OK to Discharge: Yes (when medically stable)

## 2021-09-02 NOTE — CASE MANAGEMENT
Message to [address], pt stable for DC    Met with pt, she does not feel she can go home, she is not able to ambulate distances or do many steps    OT evaluated, pt requires 24/7 supervision    [address] will be coming to evaluate patient

## 2021-09-02 NOTE — PLAN OF CARE
Problem: PHYSICAL THERAPY ADULT  Goal: Performs mobility at highest level of function for planned discharge setting  See evaluation for individualized goals  Description: Treatment/Interventions: Functional transfer training, LE strengthening/ROM, Elevations, Therapeutic exercise, Endurance training, Patient/family training, Equipment eval/education, Bed mobility, Gait training, Spoke to nursing, OT  Equipment Recommended: Toma Krishnan       See flowsheet documentation for full assessment, interventions and recommendations  Outcome: Progressing  Note: Prognosis: Fair  Problem List: Decreased strength, Decreased range of motion, Decreased endurance, Decreased mobility, Pain  Assessment: Pt seen for PT treatment session this date  Therapy session focused on functional transfers, gait training, stair training, and endurance training in order to improve overall mobility and independence  Pt requires assist of 1 for all mobility performed this date  Pt performed multiple STS from EOB/ bedside chair to RW at S level  Pt ambulated household distances with RW at S level; cues for upright posture and sequencing required  Pt required seated rest breaks after each gait trial 2* increased fatigue and back pain  Pt negotiated 3 steps using L HR with min Ax1  Pt making steady progress toward goals  Pt was left sitting upright in bedside chair with chair alarm on at the end of PT session with all needs in reach  Pt would benefit from continued PT services while in hospital to address remaining limitations  PT to continue to follow pt and recommends home with increased social support and HHPT  The patient's AM-PAC Basic Mobility Inpatient Short Form Raw Score is 19, Standardized Score is 42 48  A standardized score less than 42 9 suggests the patient may benefit from discharge to post-acute rehabilitation services  Please also refer to the recommendation of the Physical Therapist for safe discharge planning    Barriers to Discharge: Decreased caregiver support       PT Discharge Recommendation: Home with home health rehabilitation (+ increased social support; please also see OT recommendation)     PT - OK to Discharge: Yes (once medically cleared )    See flowsheet documentation for full assessment

## 2021-09-02 NOTE — PROGRESS NOTES
INTERNAL MEDICINE RESIDENCY PROGRESS NOTE     Name: Kera Pineda   Age & Sex: 60 y o  female   MRN: [id number]  Unit/Bed#: [facility] 908-01   Encounter: [id number]  Team: SOD Team A    PATIENT INFORMATION     Name: Kera Pineda   Age & Sex: 60 y o  female   MRN: [id number]  Hospital Stay Days: 13    ASSESSMENT/PLAN     Principal Problem:    Ambulatory dysfunction  Active Problems:    Chronic pain syndrome    Esophageal reflux    Generalized anxiety disorder    Tardive dyskinesia    History of CVA (cerebrovascular accident)    Mixed hyperlipidemia      Mixed hyperlipidemia  Assessment & Plan  -On Atorvastatin 40 mg PO qd    History of CVA (cerebrovascular accident)  Assessment & Plan  -On Aspirin 81 mg PO qd  -On Atorvastatin 40 mg PO qd    Tardive dyskinesia  Assessment & Plan  -Continue Valbenazine 80 mg daily  -appears to have slightly improved with addition of Lyrica 100 mg t i d     Generalized anxiety disorder  Assessment & Plan  Patient is on multiple psychiatric medications and was recently evaluated by inpatient psych at [address] [place] [address] with adjustments to her medications including buspirone 50 mg t i d , paroxetine 60 mg daily, quetiapine 50 mg 4 times daily, mirtazapine 7 5 mg q h s , lithium 300 mg q h s  And hydroxyzine 50 mg t i d  P r n  Jone Ortauce Patient continues to have increased anxiety with daily morning panic attacks  Patient also reports that her anxiety is worsened by her lack of pain control  Plan:  · Continue medical management  · Continue current psychotropic medication  · She is psychiatrically cleared to go to SNF  · Patient could benefit from outpatient psychiatric evaluation and follow-up  · Confirmed psych medication regimen following discharge from [address] [place] [address] as noted above  Esophageal reflux  Assessment & Plan  Continue Protonix 20 mg daily  Chronic pain syndrome  Assessment & Plan  Patient has been on chronic opioids for low back and leg pain    She was seen in the clinic and discussed opioid tapering  Most recent opioid prescription per PDMP was for morphine 15 mg for 10 days (20 tablets)  Per chart review, concern for polypharmacy and suspicion of drug-seeking  · On Tylenol 650 mg q8h  · Lidocaine patch/Voltaren gel  · On Lyrica 100 mg  t i d   · Oxy IR 2 5 mg q6h prn  · Bengay topical ointment 4 times daily PRN  · 8/21 Added Aqua K   · 8/27 Added Robaxin  500 mg q 6h p r n     * Ambulatory dysfunction  Assessment & Plan  Patient with decreased physical mobility, at risk for falls  She uses walker and wheelchair at home  Presented to the ED requesting placement to SNF  · Encourage good body mechanics and assist with transfers  · Keep personal items and call bell close to the patient  · PT and OT- recommends post acute rehab  · Patient is pending placement-case management consult    Cough-resolved as of 8/30/2021  Assessment & Plan  Patient complains of progressive worsening cough from 08/25 to 8/26  Cough is nonproductive  Patient also reports associated chills, nasal congestion, and nausea  -COVID PCR negative  -chest x-ray unremarkable  -Robitussin for cough  -continue to trend white count and fevers    -resolved as of 8/29        Disposition: Patient is medically stable, on psychotropic medications along with appropriate pain control for her back pain  We will follow up with case management for placement for this patient  SUBJECTIVE     Patient seen and examined  No acute events overnight  Patient reports sleeping well overnight  Patient denies any fevers, chest pain, shortness of breath, nausea, vomiting, diarrhea  Reports of intermittent back pain and anxiety  Patient otherwise denies any questions or concerns at this time      OBJECTIVE     Vitals:    09/01/21 1123 09/01/21 1524 09/02/21 0121 09/02/21 0224   BP:  119/79 (!) 144/115 117/63   Pulse: 66 60 62 60   Resp:  16     Temp:  98 1 °F (36 7 °C) 97 8 °F (36 6 °C)    TempSrc:       SpO2: 97% 99% 97% 97%   Weight:       Height:          Temperature:   Temp (24hrs), Av 1 °F (36 7 °C), Min:97 8 °F (36 6 °C), Max:98 3 °F (36 8 °C)    Temperature: 97 8 °F (36 6 °C)  Intake & Output:  I/O        07 -  0700  07 -  0700    P  O  120 402    Total Intake(mL/kg) 120 (1 3) 402 (4 4)    Urine (mL/kg/hr)  400 (0 2)    Total Output  400    Net +120 +2          Unmeasured Urine Occurrence  1 x    Unmeasured Stool Occurrence 1 x         Weights:   IBW (Ideal Body Weight): 47 8 kg    Body mass index is 37 99 kg/m²  Weight (last 2 days)     None        Physical Exam  Constitutional:       Appearance: She is obese  HENT:      Head: Normocephalic and atraumatic  Cardiovascular:      Rate and Rhythm: Normal rate and regular rhythm  Pulses: Normal pulses  Heart sounds: Normal heart sounds  Pulmonary:      Effort: Pulmonary effort is normal       Breath sounds: Normal breath sounds  Abdominal:      General: Bowel sounds are normal       Palpations: Abdomen is soft  Skin:     General: Skin is warm and dry  Capillary Refill: Capillary refill takes less than 2 seconds  Neurological:      Mental Status: She is alert  Psychiatric:         Mood and Affect: Mood normal        LABORATORY DATA     Labs: I have personally reviewed pertinent reports    Results from last 7 days   Lab Units 21  0608 21  1038 21  0538   WBC Thousand/uL 4 19* 4 25* 4 48   HEMOGLOBIN g/dL 12 5 12 6 12 7   HEMATOCRIT % 39 4 38 8 38 6   PLATELETS Thousands/uL 230 251 257   NEUTROS PCT % 52 67 54   MONOS PCT % 9 8 7      Results from last 7 days   Lab Units 21  0608 21  1038 21  0538   POTASSIUM mmol/L 3 7 4 0 3 2*   CHLORIDE mmol/L 110* 113* 110*   CO2 mmol/L 28 30 29   BUN mg/dL 10 11 10   CREATININE mg/dL 0 67 0 81 0 72   CALCIUM mg/dL 8 1* 7 9* 8 1*                            IMAGING & DIAGNOSTIC TESTING     Radiology Results: I have personally reviewed pertinent reports  No results found  Other Diagnostic Testing: I have personally reviewed pertinent reports      ACTIVE MEDICATIONS     Current Facility-Administered Medications   Medication Dose Route Frequency    acetaminophen (TYLENOL) tablet 650 mg  650 mg Oral Q8H [address]    amLODIPine (NORVASC) tablet 5 mg  5 mg Oral Daily    ascorbic acid (VITAMIN C) tablet 500 mg  500 mg Oral BID    aspirin chewable tablet 81 mg  81 mg Oral Daily    atorvastatin (LIPITOR) tablet 40 mg  40 mg Oral Daily With Dinner    busPIRone (BUSPAR) tablet 15 mg  15 mg Oral TID    dextromethorphan-guaiFENesin (ROBITUSSIN DM) oral syrup 10 mL  10 mL Oral Q4H PRN    Diclofenac Sodium (VOLTAREN) 1 % topical gel 2 g  2 g Topical 4x Daily    enoxaparin (LOVENOX) subcutaneous injection 40 mg  40 mg Subcutaneous Daily    ferrous sulfate tablet 325 mg  325 mg Oral Daily With Breakfast    folic acid (FOLVITE) tablet 1,000 mcg  1,000 mcg Oral Daily    hydrOXYzine HCL (ATARAX) tablet 50 mg  50 mg Oral TID PRN    lidocaine (LIDODERM) 5 % patch 1 patch  1 patch Topical Daily    lidocaine (LIDODERM) 5 % patch 1 patch  1 patch Topical Daily    lithium carbonate (LITHOBID) CR tablet 300 mg  300 mg Oral HS    LORazepam (ATIVAN) tablet 0 5 mg  0 5 mg Oral Q8H PRN    menthol-methyl salicylate (BENGAY) 23-21 % cream   Apply externally 4x Daily PRN    methocarbamol (ROBAXIN) tablet 500 mg  500 mg Oral Q6H PRN    mirtazapine (REMERON) tablet 7 5 mg  7 5 mg Oral HS PRN    ondansetron (ZOFRAN-ODT) dispersible tablet 4 mg  4 mg Oral Q6H PRN    oxyCODONE (ROXICODONE) IR tablet 2 5 mg  2 5 mg Oral Q6H PRN    pantoprazole (PROTONIX) EC tablet 20 mg  20 mg Oral Early Morning    PARoxetine (PAXIL) tablet 60 mg  60 mg Oral Daily    polyethylene glycol (MIRALAX) packet 17 g  17 g Oral Daily    prazosin (MINIPRESS) capsule 2 mg  2 mg Oral Daily    pregabalin (LYRICA) capsule 100 mg  100 mg Oral TID    propranolol (INDERAL) tablet 20 mg  20 mg Oral BID before breakfast/lunch    QUEtiapine (SEROquel) tablet 50 mg  50 mg Oral 4x Daily (PC & HS)    senna-docusate sodium (SENOKOT S) 8 6-50 mg per tablet 1 tablet  1 tablet Oral Daily PRN    Valbenazine Tosylate CAPS 80 mg  80 mg Oral Daily       VTE Pharmacologic Prophylaxis: Enoxaparin (Lovenox)  VTE Mechanical Prophylaxis: sequential compression device    Portions of the record may have been created with voice recognition software  Occasional wrong word or "sound a like" substitutions may have occurred due to the inherent limitations of voice recognition software    Read the chart carefully and recognize, using context, where substitutions have occurred   ==  Sabiha Lance MD  [address] [facility]  Internal Medicine Residency PGY-1

## 2021-09-02 NOTE — OCCUPATIONAL THERAPY NOTE
Occupational Therapy Progress Note     Patient Name: Malina Curtis  JFYMY'D Date: 9/2/2021  Problem List  Principal Problem:    Ambulatory dysfunction  Active Problems:    Chronic pain syndrome    Esophageal reflux    Generalized anxiety disorder    Tardive dyskinesia    History of CVA (cerebrovascular accident)    Mixed hyperlipidemia          09/02/21 1450   OT Last Visit   OT Visit Date 09/02/21   Note Type   Note Type Treatment   Restrictions/Precautions   Weight Bearing Precautions Per Order No   Other Precautions Bed Alarm;O2;Pain   General   Response to Previous Treatment Patient with no complaints from previous session   Lifestyle   Autonomy pta pt reports son assists w/ ADLs/IADLs  pt reports using rollator for functional mobility PTA   Reciprocal Relationships supportive son- reports son is going back to work shortly and she will be home alone   Service to Others not currentlying working   Intrinsic Gratification enjoys watching tv   Pain Assessment   Pain Assessment Tool FLACC   Pain Location/Orientation Orientation: Bilateral;Location: Leg   Hospital Pain Intervention(s) Repositioned; Ambulation/increased activity   Pain Rating: FLACC (Rest) - Face 0   Pain Rating: FLACC (Rest) - Legs 0   Pain Rating: FLACC (Rest) - Activity 0   Pain Rating: FLACC (Rest) - Cry 0   Pain Rating: FLACC (Rest) - Consolability 0   Score: FLACC (Rest) 0   Pain Rating: FLACC (Activity) - Face 0   Pain Rating: FLACC (Activity) - Legs 0   Pain Rating: FLACC (Activity) - Activity 0   Pain Rating: FLACC (Activity) - Cry 1   Pain Rating: FLACC (Activity) - Consolability 1   Score: FLACC (Activity) 2   ADL   LB Dressing Assistance 5  Supervision/Setup   LB Dressing Comments sock mgmt    Bed Mobility   Supine to Sit 5  Supervision   Additional items Assist x 1; Increased time required   Sit to Supine   (CGA)   Additional items LE management   Transfers   Sit to Stand 5  Supervision   Stand to Sit 5  Supervision   Additional Comments Transfers with rollator    Functional Mobility   Functional Mobility 5  Supervision   Additional items   (rollator)   Cognition   Overall Cognitive Status Impaired   Arousal/Participation Alert; Responsive; Cooperative   Attention Attends with cues to redirect   Orientation Level Oriented to person;Oriented to place   Memory Decreased short term memory   Following Commands Follows one step commands with increased time or repetition   Comments Pt pleasant and cooperative t/o session    Cognition Assessment Tools ACLS   Score 3 6   Activity Tolerance   Activity Tolerance Patient limited by fatigue;Patient limited by pain   Medical Staff Made Aware RN clearance for session    Assessment   Assessment Pt seen for skilled OT tx session  Pt performed sup to sit with S, CGA for sit to sup, S for STS transfers and fnxl ambulation with rollator  S for sock mgmt  Pt also participated in 1509 QVPN  Pt scored 3 6 indicating 24/7 supervision is recommended  However, post assessment, pt reports feeling distracted 2* increased social issues  Easily frustrated, poor attention to directions/task at hand, demonstrates increased anxiety during session  Please see further details regarding score below  OT will continue to follow and further assess fnxl/cognitive impairments  From OT standpoint, recommendation would be home with 24/7S and skilled therapy  Pt was left in bed after session with all current needs met  Plan   Treatment Interventions ADL retraining;Functional transfer training;UE strengthening/ROM; Endurance training;Cognitive reorientation;Patient/family training;Equipment evaluation/education; Compensatory technique education;Continued evaluation; Energy conservation; Activityengagement   Goal Expiration Date 09/13/21   OT Treatment Day 3   OT Frequency 2-3x/wk   Recommendation   OT Discharge Recommendation Home with home health rehabilitation  (+ 24/7 supervision )   OT - OK to Discharge Yes  (when medically stable)   AM-PAC Daily Activity Inpatient   Lower Body Dressing 3   Bathing 3   Toileting 3   Upper Body Dressing 3   Grooming 3   Eating 4   Daily Activity Raw Score 19   Daily Activity Standardized Score (Calc for Raw Score >=11) 40 22   AM-PAC Applied Cognition Inpatient   Following a Speech/Presentation 3   Understanding Ordinary Conversation 4   Taking Medications 3   Remembering Where Things Are Placed or Put Away 3   Remembering List of 4-5 Errands 2   Taking Care of Complicated Tasks 2   Applied Cognition Raw Score 17   Applied Cognition Standardized Score 36 52      3 6    Administered Chris Cognitive Level Screen (ACLS)  Pt scored 3 6/6 0 indicating 24 hour supervision is recommended to provide the supplies needed for ADLs, sequence though routine steps of toileting, bathing, grooming and dressing and to remove dangerous objects  Behavior:  Notices effects on objects  Will wait for effect when cued  Will require 2-3x the usual time to complete routine tasks  Will not remember safety precautions  Grooming:  Connell, brushes hair while looking in the mirror  May miss back or sides  May initiate tasks based on familiar actions  Can alter amount of product used  May engage in impulsive actions  Do not leave unsupervised for long periods of time  Dressing:   Able to select 1 top and 1 bottom garment from dresser; however, garments may be inappropriate to season/occasion  May layer clothing incorrectly  May not attend to condition of clothing (cleanliness or need for repair)  May argue with caregiver selections  Bathing: Will follow perimeter of body to wash  May attempt to wash back  May forget steps of task in sequence  Does not measure amount of shampoo or soap  Walking and exercising:  Ambulates within several familiar living areas  by closed doors  Aware of inside and outside of living area    May wander to a new location to access to desired activity but may get lost   Impulsive actions may result in falls  Eating: May wait for others to sit before eating when requested  May be able to wait for short periods of time for food  May be able to imitate actions like opening containers  Failure to observe manners may alienate others  Check food/beverage temperatures  Cannot follow dietary restrictions  Toileting:   May be able to imitate wiping however effects are not noted  Medications: May recognize color and shape of pills  Does not understand side effects  May refuse to take medications  Store medications in secure location away from person in childproof containers  Hand pre-measured medications to patient to ensure compliance  Use of adaptive devices: May be able to learn to use a familiar adaptive device after much repetitive training  Housekeeping:   No awareness of need for housekeeping  May follow the perimeter of an object as he/she wipes  May follow imitation but may perseverate on task (ie: dry one dish for extended period of time)  Food preparation:  Does not plan for food  May eat from refrigerator  May be able to imitate repetitive actions such as peeling a potato  May be able to set table with demonstration  Watch for impulsive actions and distractibility  Restrict access to sharp tools  Spending money:  May be able to place coins in slots on vending machines  May need assistance to sequence though steps of tasks  May give away or loose money  Should have assist for all finances  Shopping: Follows a guide to shopping areas, looks in windows or at displays with no intent to purchase  May take items without paying  May fail to notice price or if they have enough money to pay  Do not leave individual alone in shopping areas  Laundry:  May be able to fold towels by lining up straight edges  May not recognize need to launder clothes  Traveling: May wait for vehicle to stop when cued    May get into available vehicle without consideration of destination or cost   Watch for impulsive actions such as getting on a bus or waiting elevator  Telephone:  May be able to wait for caregiver to answer phone and may be able to make sure phone is hung up properly  May be taught how to dial 911 but cannot properly  when to use emergency numbers and may dial repeatedly  Driving: Should not operate a motor vehicle            Vanessa Brandon MS, OTR/L

## 2021-09-02 NOTE — PHYSICAL THERAPY NOTE
PHYSICAL THERAPY NOTE          Patient Name: Kera COX Date: 9/2/2021 09/02/21 1021   PT Last Visit   PT Visit Date 09/02/21   Note Type   Note Type Treatment   Pain Assessment   Pain Assessment Tool 0-10   Pain Score 8   Pain Location/Orientation Location: Back   Restrictions/Precautions   Weight Bearing Precautions Per Order No   Other Precautions Chair Alarm; Fall Risk;Pain   General   Chart Reviewed Yes   Response to Previous Treatment Patient with no complaints from previous session  Family/Caregiver Present No   Cognition   Overall Cognitive Status WFL   Arousal/Participation Alert; Responsive; Cooperative   Attention Attends with cues to redirect   Orientation Level Oriented to person;Oriented to place; Disoriented to time;Oriented to situation   Memory Within functional limits   Following Commands Follows one step commands without difficulty   Comments Pt pleasant and cooperative to participate in therapy session this date    Bed Mobility   Supine to Sit Unable to assess   Sit to Supine Unable to assess   Additional Comments Pt sitting EOB upon arrival  Pt sitting upright in bedside chair with chair alarm on with all needs wihtin reach at the end of therapy session    Transfers   Sit to Stand 5  Supervision   Additional items Armrests; Increased time required;Assist x 1   Stand to Sit 5  Supervision   Additional items Increased time required;Verbal cues; Assist x 1   Additional Comments Transfers with RW; pt performed 5x STS throughout therapy session    Ambulation/Elevation   Gait pattern Excessively slow; Short stride; Foward flexed;Decreased foot clearance   Gait Assistance 5  Supervision   Additional items Assist x 1;Verbal cues   Assistive Device Rolling walker   Distance 30ft, 40ft, 50ft, 30ft  (seated rest break in between each gait trial )   Stair Management Assistance 4  Minimal assist   Additional items Assist x 1;Verbal cues   Stair Management Technique One rail L;Step to pattern; Foreward;Backward;Nonreciprocal  (b/l HHA on L HR )   Number of Stairs 3   Balance   Static Sitting Fair +   Dynamic Sitting Fair   Static Standing Fair   Dynamic Standing Fair -   Ambulatory Fair -   Endurance Deficit   Endurance Deficit Yes   Endurance Deficit Description fatigue, pain    Activity Tolerance   Activity Tolerance Patient tolerated treatment well;Patient limited by fatigue;Patient limited by pain   Medical Staff Made Aware Rehab aide    Nurse Made Aware RN cleared pt to participate in therapy session    Assessment   Prognosis Fair   Problem List Decreased strength;Decreased range of motion;Decreased endurance;Decreased mobility;Pain   Assessment Pt seen for PT treatment session this date  Therapy session focused on functional transfers, gait training, stair training, and endurance training in order to improve overall mobility and independence  Pt requires assist of 1 for all mobility performed this date  Pt performed multiple STS from EOB/ bedside chair to RW at S level  Pt ambulated household distances with RW at S level; cues for upright posture and sequencing required  Pt required seated rest breaks after each gait trial 2* increased fatigue and back pain  Pt negotiated 3 steps using L HR with min Ax1  Pt making steady progress toward goals  Pt was left sitting upright in bedside chair with chair alarm on at the end of PT session with all needs in reach  Pt would benefit from continued PT services while in hospital to address remaining limitations  PT to continue to follow pt and recommends home with increased social support and HHPT  The patient's AM-PAC Basic Mobility Inpatient Short Form Raw Score is 19, Standardized Score is 42 48  A standardized score less than 42 9 suggests the patient may benefit from discharge to post-acute rehabilitation services   Please also refer to the recommendation of the Physical Therapist for safe discharge planning  Barriers to Discharge Decreased caregiver support   Goals   Patient Goals to rest    STG Expiration Date 09/12/21  (extend POC- remains appropriate )   PT Treatment Day 6   Plan   Treatment/Interventions Functional transfer training;LE strengthening/ROM; Elevations; Endurance training;Patient/family training;Equipment eval/education;Gait training;Spoke to nursing;Spoke to case management;OT   Progress Progressing toward goals   PT Frequency Other (Comment)  (3-5x/wk )   Recommendation   PT Discharge Recommendation Home with home health rehabilitation  (+ increased social support; please also see OT recommendation)   Equipment Recommended [facility] Recommended Wheeled walker   PT - OK to Discharge Yes  (once medically cleared )   Additional Comments Please also see OT recommendations    AM-PAC Basic Mobility Inpatient   Turning in Bed Without Bedrails 4   Lying on Back to Sitting on Edge of Flat Bed 3   Moving Bed to Chair 3   Standing Up From Chair 3   Walk in Room 3   Climb 3-5 Stairs 3   Basic Mobility Inpatient Raw Score 19   Basic Mobility Standardized Score 42 48     Chelsy Coombs, PT, DPT

## 2021-09-03 PROCEDURE — 99232 SBSQ HOSP IP/OBS MODERATE 35: CPT | Performed by: INTERNAL MEDICINE

## 2021-09-03 RX ORDER — OXYCODONE HYDROCHLORIDE 5 MG/1
2.5 TABLET ORAL ONCE
Status: COMPLETED | OUTPATIENT
Start: 2021-09-03 | End: 2021-09-03

## 2021-09-03 RX ORDER — OXYCODONE HYDROCHLORIDE AND ACETAMINOPHEN 5; 325 MG/1; MG/1
1 TABLET ORAL EVERY 6 HOURS PRN
Status: DISCONTINUED | OUTPATIENT
Start: 2021-09-03 | End: 2021-09-23 | Stop reason: HOSPADM

## 2021-09-03 RX ADMIN — OXYCODONE HYDROCHLORIDE AND ACETAMINOPHEN 500 MG: 500 TABLET ORAL at 16:53

## 2021-09-03 RX ADMIN — QUETIAPINE FUMARATE 50 MG: 25 TABLET ORAL at 12:39

## 2021-09-03 RX ADMIN — OXYCODONE HYDROCHLORIDE 2.5 MG: 5 TABLET ORAL at 14:29

## 2021-09-03 RX ADMIN — FERROUS SULFATE TAB 325 MG (65 MG ELEMENTAL FE) 325 MG: 325 (65 FE) TAB at 07:48

## 2021-09-03 RX ADMIN — ACETAMINOPHEN 650 MG: 325 TABLET, FILM COATED ORAL at 06:29

## 2021-09-03 RX ADMIN — PAROXETINE HYDROCHLORIDE 60 MG: 20 TABLET, FILM COATED ORAL at 21:29

## 2021-09-03 RX ADMIN — ACETAMINOPHEN 650 MG: 325 TABLET, FILM COATED ORAL at 14:29

## 2021-09-03 RX ADMIN — BUSPIRONE HYDROCHLORIDE 15 MG: 10 TABLET ORAL at 07:48

## 2021-09-03 RX ADMIN — LITHIUM CARBONATE 300 MG: 300 TABLET, FILM COATED, EXTENDED RELEASE ORAL at 21:29

## 2021-09-03 RX ADMIN — PREGABALIN 100 MG: 100 CAPSULE ORAL at 21:30

## 2021-09-03 RX ADMIN — METHOCARBAMOL 500 MG: 500 TABLET, FILM COATED ORAL at 07:54

## 2021-09-03 RX ADMIN — BUSPIRONE HYDROCHLORIDE 15 MG: 10 TABLET ORAL at 21:29

## 2021-09-03 RX ADMIN — VALBENAZINE 80 MG: 80 CAPSULE ORAL at 07:50

## 2021-09-03 RX ADMIN — OXYCODONE HYDROCHLORIDE 2.5 MG: 5 TABLET ORAL at 16:50

## 2021-09-03 RX ADMIN — PROPRANOLOL HYDROCHLORIDE 20 MG: 20 TABLET ORAL at 06:29

## 2021-09-03 RX ADMIN — PROPRANOLOL HYDROCHLORIDE 20 MG: 20 TABLET ORAL at 12:39

## 2021-09-03 RX ADMIN — DICLOFENAC SODIUM 2 G: 10 GEL TOPICAL at 21:34

## 2021-09-03 RX ADMIN — ACETAMINOPHEN 650 MG: 325 TABLET, FILM COATED ORAL at 21:29

## 2021-09-03 RX ADMIN — QUETIAPINE FUMARATE 50 MG: 25 TABLET ORAL at 07:48

## 2021-09-03 RX ADMIN — OXYCODONE HYDROCHLORIDE AND ACETAMINOPHEN 500 MG: 500 TABLET ORAL at 07:49

## 2021-09-03 RX ADMIN — FOLIC ACID 1000 MCG: 1 TABLET ORAL at 07:49

## 2021-09-03 RX ADMIN — OXYCODONE HYDROCHLORIDE 2.5 MG: 5 TABLET ORAL at 07:48

## 2021-09-03 RX ADMIN — LORAZEPAM 0.5 MG: 0.5 TABLET ORAL at 07:49

## 2021-09-03 RX ADMIN — PREGABALIN 100 MG: 100 CAPSULE ORAL at 16:50

## 2021-09-03 RX ADMIN — ATORVASTATIN CALCIUM 40 MG: 40 TABLET, FILM COATED ORAL at 16:51

## 2021-09-03 RX ADMIN — QUETIAPINE FUMARATE 50 MG: 25 TABLET ORAL at 21:30

## 2021-09-03 RX ADMIN — BUSPIRONE HYDROCHLORIDE 15 MG: 10 TABLET ORAL at 16:50

## 2021-09-03 RX ADMIN — PREGABALIN 100 MG: 100 CAPSULE ORAL at 07:48

## 2021-09-03 RX ADMIN — ENOXAPARIN SODIUM 40 MG: 40 INJECTION SUBCUTANEOUS at 07:54

## 2021-09-03 RX ADMIN — QUETIAPINE FUMARATE 50 MG: 25 TABLET ORAL at 16:51

## 2021-09-03 RX ADMIN — OXYCODONE HYDROCHLORIDE AND ACETAMINOPHEN 1 TABLET: 5; 325 TABLET ORAL at 21:33

## 2021-09-03 RX ADMIN — AMLODIPINE BESYLATE 5 MG: 5 TABLET ORAL at 07:51

## 2021-09-03 RX ADMIN — PRAZOSIN HYDROCHLORIDE 2 MG: 2 CAPSULE ORAL at 07:50

## 2021-09-03 RX ADMIN — PANTOPRAZOLE SODIUM 20 MG: 20 TABLET, DELAYED RELEASE ORAL at 06:29

## 2021-09-03 RX ADMIN — DICLOFENAC SODIUM 2 G: 10 GEL TOPICAL at 07:55

## 2021-09-03 RX ADMIN — ASPIRIN 81 MG CHEWABLE TABLET 81 MG: 81 TABLET CHEWABLE at 07:49

## 2021-09-03 NOTE — PROGRESS NOTES
INTERNAL MEDICINE RESIDENCY PROGRESS NOTE     Name: Eusebia Pérez   Age & Sex: 60 y o  female   MRN: [id number]  Unit/Bed#: [facility] 908-01   Encounter: [id number]  Team: SOD Team A    PATIENT INFORMATION     Name: Eusebia Pérez   Age & Sex: 60 y o  female   MRN: [id number]  Hospital Stay Days: 14    ASSESSMENT/PLAN     Principal Problem:    Ambulatory dysfunction  Active Problems:    Chronic pain syndrome    Esophageal reflux    Generalized anxiety disorder    Tardive dyskinesia    History of CVA (cerebrovascular accident)    Mixed hyperlipidemia      Mixed hyperlipidemia  Assessment & Plan  -On Atorvastatin 40 mg PO qd    History of CVA (cerebrovascular accident)  Assessment & Plan  -On Aspirin 81 mg PO qd  -On Atorvastatin 40 mg PO qd    Tardive dyskinesia  Assessment & Plan  -Continue Valbenazine 80 mg daily  -appears to have slightly improved with addition of Lyrica 100 mg t i d     Generalized anxiety disorder  Assessment & Plan  Patient is on multiple psychiatric medications and was recently evaluated by inpatient psych at [facility] with adjustments to her medications including buspirone 50 mg t i d , paroxetine 60 mg daily, quetiapine 50 mg 4 times daily, mirtazapine 7 5 mg q h s , lithium 300 mg q h s  And hydroxyzine 50 mg t i d  P r n  [facility] Patient continues to have increased anxiety with daily morning panic attacks  Patient also reports that her anxiety is worsened by her lack of pain control  Plan:  · Continue medical management  · Continue current psychotropic medication  · She is psychiatrically cleared to go to SNF  · Patient could benefit from outpatient psychiatric evaluation and follow-up  · Confirmed psych medication regimen following discharge from [address] [place] [address] as noted above  Esophageal reflux  Assessment & Plan  Continue Protonix 20 mg daily  Chronic pain syndrome  Assessment & Plan  Patient has been on chronic opioids for low back and leg pain    She was seen in the clinic and discussed opioid tapering  Most recent opioid prescription per PDMP was for morphine 15 mg for 10 days (20 tablets)  Per chart review, concern for polypharmacy and suspicion of drug-seeking  · On Tylenol 650 mg q8h  · Lidocaine patch/Voltaren gel  · On Lyrica 100 mg  t i d   · Oxy IR 2 5 mg q6h prn  · Bengay topical ointment 4 times daily PRN  ·  Added Aqua K   ·  Added Robaxin  500 mg q 6h p r n     * Ambulatory dysfunction  Assessment & Plan  Patient with decreased physical mobility, at risk for falls  She uses walker and wheelchair at home  Presented to the ED requesting placement to SNF  · Encourage good body mechanics and assist with transfers  · Keep personal items and call bell close to the patient  · PT and OT- recommends post acute rehab  · Patient is pending placement-case management consult    Cough-resolved as of 2021  Assessment & Plan  Patient complains of progressive worsening cough from  to   Cough is nonproductive  Patient also reports associated chills, nasal congestion, and nausea  -COVID PCR negative  -chest x-ray unremarkable  -Robitussin for cough  -continue to trend white count and fevers    -resolved as of         Disposition:  Awaiting post acute rehab    SUBJECTIVE     Patient seen and examined  No acute events overnight  Some back pain overnight  OBJECTIVE     Vitals:    21 1502 21 2236 21 2236 21 0628   BP: 135/77 108/66 108/66 131/87   Pulse: 69  63 66   Resp: 18      Temp: 98 6 °F (37 °C) 97 9 °F (36 6 °C) 97 9 °F (36 6 °C)    TempSrc:       SpO2: 98%  97% 96%   Weight:       Height:          Temperature:   Temp (24hrs), Av 1 °F (36 7 °C), Min:97 9 °F (36 6 °C), Max:98 6 °F (37 °C)    Temperature: 97 9 °F (36 6 °C)  Intake & Output:  I/O        07 -  0700  07 -  0700    P  O  402 1020    Total Intake(mL/kg) 402 (4 4) 1020 (11 2)    Urine (mL/kg/hr) 400 (0 2) 0 (0) Total Output 400 0    Net +2 +1020          Unmeasured Urine Occurrence 1 x 3 x        Weights:   IBW (Ideal Body Weight): 47 8 kg    Body mass index is 37 99 kg/m²  Weight (last 2 days)     None        Physical Exam  Constitutional:       General: She is not in acute distress  Appearance: She is normal weight  HENT:      Head: Normocephalic and atraumatic  Eyes:      General: No scleral icterus  Pupils: Pupils are equal, round, and reactive to light  Cardiovascular:      Rate and Rhythm: Normal rate and regular rhythm  Pulses: Normal pulses  Heart sounds: No murmur heard  Pulmonary:      Effort: No respiratory distress  Breath sounds: Normal breath sounds  Abdominal:      General: There is no distension  Tenderness: There is no abdominal tenderness  Musculoskeletal:         General: No swelling  Normal range of motion  Comments: Tenderness to palpation lower back bilaterally   Skin:     General: Skin is warm and dry  Capillary Refill: Capillary refill takes less than 2 seconds  Neurological:      General: No focal deficit present  Mental Status: She is alert and oriented to person, place, and time  Mental status is at baseline  Psychiatric:         Mood and Affect: Mood normal        LABORATORY DATA     Labs: I have personally reviewed pertinent reports    Results from last 7 days   Lab Units 08/30/21  0608 08/29/21  1038 08/28/21  0538   WBC Thousand/uL 4 19* 4 25* 4 48   HEMOGLOBIN g/dL 12 5 12 6 12 7   HEMATOCRIT % 39 4 38 8 38 6   PLATELETS Thousands/uL 230 251 257   NEUTROS PCT % 52 67 54   MONOS PCT % 9 8 7      Results from last 7 days   Lab Units 08/30/21  0608 08/29/21  1038 08/28/21  0538   POTASSIUM mmol/L 3 7 4 0 3 2*   CHLORIDE mmol/L 110* 113* 110*   CO2 mmol/L 28 30 29   BUN mg/dL 10 11 10   CREATININE mg/dL 0 67 0 81 0 72   CALCIUM mg/dL 8 1* 7 9* 8 1*                            IMAGING & DIAGNOSTIC TESTING     Radiology Results: I have personally reviewed pertinent reports  No results found  Other Diagnostic Testing: I have personally reviewed pertinent reports      ACTIVE MEDICATIONS     Current Facility-Administered Medications   Medication Dose Route Frequency    acetaminophen (TYLENOL) tablet 650 mg  650 mg Oral Q8H [address]    amLODIPine (NORVASC) tablet 5 mg  5 mg Oral Daily    ascorbic acid (VITAMIN C) tablet 500 mg  500 mg Oral BID    aspirin chewable tablet 81 mg  81 mg Oral Daily    atorvastatin (LIPITOR) tablet 40 mg  40 mg Oral Daily With Dinner    busPIRone (BUSPAR) tablet 15 mg  15 mg Oral TID    dextromethorphan-guaiFENesin (ROBITUSSIN DM) oral syrup 10 mL  10 mL Oral Q4H PRN    Diclofenac Sodium (VOLTAREN) 1 % topical gel 2 g  2 g Topical 4x Daily    enoxaparin (LOVENOX) subcutaneous injection 40 mg  40 mg Subcutaneous Daily    ferrous sulfate tablet 325 mg  325 mg Oral Daily With Breakfast    folic acid (FOLVITE) tablet 1,000 mcg  1,000 mcg Oral Daily    hydrOXYzine HCL (ATARAX) tablet 50 mg  50 mg Oral TID PRN    lidocaine (LIDODERM) 5 % patch 1 patch  1 patch Topical Daily    lidocaine (LIDODERM) 5 % patch 1 patch  1 patch Topical Daily    lithium carbonate (LITHOBID) CR tablet 300 mg  300 mg Oral HS    LORazepam (ATIVAN) tablet 0 5 mg  0 5 mg Oral Q8H PRN    menthol-methyl salicylate (BENGAY) 77-49 % cream   Apply externally 4x Daily PRN    methocarbamol (ROBAXIN) tablet 500 mg  500 mg Oral Q6H PRN    mirtazapine (REMERON) tablet 7 5 mg  7 5 mg Oral HS PRN    ondansetron (ZOFRAN-ODT) dispersible tablet 4 mg  4 mg Oral Q6H PRN    oxyCODONE (ROXICODONE) IR tablet 2 5 mg  2 5 mg Oral Q6H PRN    pantoprazole (PROTONIX) EC tablet 20 mg  20 mg Oral Early Morning    PARoxetine (PAXIL) tablet 60 mg  60 mg Oral Daily    polyethylene glycol (MIRALAX) packet 17 g  17 g Oral Daily    prazosin (MINIPRESS) capsule 2 mg  2 mg Oral Daily    pregabalin (LYRICA) capsule 100 mg  100 mg Oral TID    propranolol (INDERAL) tablet 20 mg  20 mg Oral BID before breakfast/lunch    QUEtiapine (SEROquel) tablet 50 mg  50 mg Oral 4x Daily (PC & HS)    senna-docusate sodium (SENOKOT S) 8 6-50 mg per tablet 1 tablet  1 tablet Oral Daily PRN    Valbenazine Tosylate CAPS 80 mg  80 mg Oral Daily       VTE Pharmacologic Prophylaxis: Enoxaparin (Lovenox)  VTE Mechanical Prophylaxis: sequential compression device    Portions of the record may have been created with voice recognition software  Occasional wrong word or "sound a like" substitutions may have occurred due to the inherent limitations of voice recognition software    Read the chart carefully and recognize, using context, where substitutions have occurred   ==  Jana Chapman, [facility]  Internal Medicine Residency PGY-3

## 2021-09-04 PROCEDURE — 99232 SBSQ HOSP IP/OBS MODERATE 35: CPT | Performed by: INTERNAL MEDICINE

## 2021-09-04 RX ADMIN — QUETIAPINE FUMARATE 50 MG: 25 TABLET ORAL at 12:59

## 2021-09-04 RX ADMIN — PREGABALIN 100 MG: 100 CAPSULE ORAL at 21:46

## 2021-09-04 RX ADMIN — BUSPIRONE HYDROCHLORIDE 15 MG: 10 TABLET ORAL at 21:46

## 2021-09-04 RX ADMIN — FERROUS SULFATE TAB 325 MG (65 MG ELEMENTAL FE) 325 MG: 325 (65 FE) TAB at 08:59

## 2021-09-04 RX ADMIN — BUSPIRONE HYDROCHLORIDE 15 MG: 10 TABLET ORAL at 15:59

## 2021-09-04 RX ADMIN — LIDOCAINE 1 PATCH: 50 PATCH TOPICAL at 08:58

## 2021-09-04 RX ADMIN — PAROXETINE HYDROCHLORIDE 60 MG: 20 TABLET, FILM COATED ORAL at 21:46

## 2021-09-04 RX ADMIN — OXYCODONE HYDROCHLORIDE AND ACETAMINOPHEN 500 MG: 500 TABLET ORAL at 09:00

## 2021-09-04 RX ADMIN — METHOCARBAMOL 500 MG: 500 TABLET, FILM COATED ORAL at 13:00

## 2021-09-04 RX ADMIN — ACETAMINOPHEN 650 MG: 325 TABLET, FILM COATED ORAL at 21:45

## 2021-09-04 RX ADMIN — FOLIC ACID 1000 MCG: 1 TABLET ORAL at 09:00

## 2021-09-04 RX ADMIN — PROPRANOLOL HYDROCHLORIDE 20 MG: 20 TABLET ORAL at 12:59

## 2021-09-04 RX ADMIN — ENOXAPARIN SODIUM 40 MG: 40 INJECTION SUBCUTANEOUS at 09:00

## 2021-09-04 RX ADMIN — QUETIAPINE FUMARATE 50 MG: 25 TABLET ORAL at 08:59

## 2021-09-04 RX ADMIN — OXYCODONE HYDROCHLORIDE AND ACETAMINOPHEN 1 TABLET: 5; 325 TABLET ORAL at 21:45

## 2021-09-04 RX ADMIN — DICLOFENAC SODIUM 2 G: 10 GEL TOPICAL at 13:02

## 2021-09-04 RX ADMIN — ACETAMINOPHEN 650 MG: 325 TABLET, FILM COATED ORAL at 06:09

## 2021-09-04 RX ADMIN — ATORVASTATIN CALCIUM 40 MG: 40 TABLET, FILM COATED ORAL at 15:59

## 2021-09-04 RX ADMIN — LIDOCAINE 1 PATCH: 50 PATCH TOPICAL at 09:01

## 2021-09-04 RX ADMIN — BUSPIRONE HYDROCHLORIDE 15 MG: 10 TABLET ORAL at 08:59

## 2021-09-04 RX ADMIN — PANTOPRAZOLE SODIUM 20 MG: 20 TABLET, DELAYED RELEASE ORAL at 06:09

## 2021-09-04 RX ADMIN — ENOXAPARIN SODIUM 40 MG: 40 INJECTION SUBCUTANEOUS at 08:44

## 2021-09-04 RX ADMIN — OXYCODONE HYDROCHLORIDE AND ACETAMINOPHEN 1 TABLET: 5; 325 TABLET ORAL at 13:00

## 2021-09-04 RX ADMIN — ASPIRIN 81 MG CHEWABLE TABLET 81 MG: 81 TABLET CHEWABLE at 08:58

## 2021-09-04 RX ADMIN — OXYCODONE HYDROCHLORIDE AND ACETAMINOPHEN 1 TABLET: 5; 325 TABLET ORAL at 06:09

## 2021-09-04 RX ADMIN — PREGABALIN 100 MG: 100 CAPSULE ORAL at 08:58

## 2021-09-04 RX ADMIN — LORAZEPAM 0.5 MG: 0.5 TABLET ORAL at 09:00

## 2021-09-04 RX ADMIN — LITHIUM CARBONATE 300 MG: 300 TABLET, FILM COATED, EXTENDED RELEASE ORAL at 21:45

## 2021-09-04 RX ADMIN — DICLOFENAC SODIUM 2 G: 10 GEL TOPICAL at 09:02

## 2021-09-04 RX ADMIN — VALBENAZINE 80 MG: 80 CAPSULE ORAL at 09:02

## 2021-09-04 RX ADMIN — OXYCODONE HYDROCHLORIDE AND ACETAMINOPHEN 500 MG: 500 TABLET ORAL at 17:17

## 2021-09-04 RX ADMIN — QUETIAPINE FUMARATE 50 MG: 25 TABLET ORAL at 17:17

## 2021-09-04 RX ADMIN — QUETIAPINE FUMARATE 50 MG: 25 TABLET ORAL at 21:46

## 2021-09-04 RX ADMIN — PROPRANOLOL HYDROCHLORIDE 20 MG: 20 TABLET ORAL at 06:09

## 2021-09-04 RX ADMIN — ACETAMINOPHEN 650 MG: 325 TABLET, FILM COATED ORAL at 13:01

## 2021-09-04 RX ADMIN — POLYETHYLENE GLYCOL 3350 17 G: 17 POWDER, FOR SOLUTION ORAL at 09:01

## 2021-09-04 RX ADMIN — PREGABALIN 100 MG: 100 CAPSULE ORAL at 15:59

## 2021-09-04 NOTE — PROGRESS NOTES
INTERNAL MEDICINE RESIDENCY PROGRESS NOTE     Name: Aashish Gonzalez   Age & Sex: 60 y o  female   MRN: [id number]  [facility]   Encounter: [id number]  Team: SOD Team A    PATIENT INFORMATION     Name: Aashish Gonzalez   Age & Sex: 60 y o  female   MRN: [id number]  Hospital Stay Days: 15    ASSESSMENT/PLAN     Principal Problem:    Ambulatory dysfunction  Active Problems:    Chronic pain syndrome    Esophageal reflux    Generalized anxiety disorder    Tardive dyskinesia    History of CVA (cerebrovascular accident)    Mixed hyperlipidemia      Mixed hyperlipidemia  Assessment & Plan  -On Atorvastatin 40 mg PO qd    History of CVA (cerebrovascular accident)  Assessment & Plan  -On Aspirin 81 mg PO qd  -On Atorvastatin 40 mg PO qd    Tardive dyskinesia  Assessment & Plan  -Continue Valbenazine 80 mg daily  -appears to have slightly improved with addition of Lyrica 100 mg t i d     Generalized anxiety disorder  Assessment & Plan  Patient is on multiple psychiatric medications and was recently evaluated by inpatient psych at [facility] with adjustments to her medications including buspirone 50 mg t i d , paroxetine 60 mg daily, quetiapine 50 mg 4 times daily, mirtazapine 7 5 mg q h s , lithium 300 mg q h s  And hydroxyzine 50 mg t i d  P r n  [facility] Patient continues to have increased anxiety with daily morning panic attacks  Patient also reports that her anxiety is worsened by her lack of pain control  Plan:  · Continue medical management  · Continue current psychotropic medication  · She is psychiatrically cleared to go to SNF  · Patient could benefit from outpatient psychiatric evaluation and follow-up  · Confirmed psych medication regimen following discharge from [address] [place] [address] as noted above  Esophageal reflux  Assessment & Plan  Continue Protonix 20 mg daily  Chronic pain syndrome  Assessment & Plan  Patient has been on chronic opioids for low back and leg pain    She was seen in the clinic and discussed opioid tapering  Most recent opioid prescription per PDMP was for morphine 15 mg for 10 days (20 tablets)  Per chart review, concern for polypharmacy and suspicion of drug-seeking  · On Tylenol 650 mg q8h  · Lidocaine patch/Voltaren gel  · On Lyrica 100 mg  t i d   · Oxy IR 2 5 mg q6h prn  · Bengay topical ointment 4 times daily PRN  ·  Added Aqua K   ·  Added Robaxin  500 mg q 6h p r n     * Ambulatory dysfunction  Assessment & Plan  Patient with decreased physical mobility, at risk for falls  She uses walker and wheelchair at home  Presented to the ED requesting placement to SNF  · Encourage good body mechanics and assist with transfers  · Keep personal items and call bell close to the patient  · PT and OT- recommends post acute rehab  · Patient is pending placement-case management consult    Cough-resolved as of 2021  Assessment & Plan  Patient complains of progressive worsening cough from  to   Cough is nonproductive  Patient also reports associated chills, nasal congestion, and nausea  -COVID PCR negative  -chest x-ray unremarkable  -Robitussin for cough  -continue to trend white count and fevers    -resolved as of       Disposition:  Patient is medically stable, on psychotropic medications along with appropriate pain control for her back pain  case management working on placement   SUBJECTIVE     Patient seen and examined, she was sitting comfortably eating breakfast not in any obvious distress on room air  No acute events overnight       OBJECTIVE     Vitals:    21 1534 21 2221 21 0605 21 0845   BP: 122/82 119/74 117/70 93/56   BP Location:    Right arm   Pulse: 64 61     Resp: 18 18     Temp: 97 6 °F (36 4 °C) 98 °F (36 7 °C)     TempSrc:       SpO2: 97% 98%     Weight:       Height:          Temperature:   Temp (24hrs), Av 8 °F (36 6 °C), Min:97 6 °F (36 4 °C), Max:98 °F (36 7 °C)    Temperature: 98 °F (36 7 °C)  Intake & Output:  I/O       09/02 0701 - 09/03 0700 09/03 0701 - 09/04 0700 09/04 0701 - 09/05 0700    P  O  1020 520     Total Intake(mL/kg) 1020 (11 2) 520 (5 7)     Urine (mL/kg/hr) 0 (0) 0 (0)     Total Output 0 0     Net +1020 +520            Unmeasured Urine Occurrence 3 x          Weights:   IBW (Ideal Body Weight): 47 8 kg    Body mass index is 37 99 kg/m²  Weight (last 2 days)     None        Physical Exam  Vitals and nursing note reviewed  Constitutional:       General: She is not in acute distress  Appearance: She is well-developed  She is obese  HENT:      Head: Normocephalic and atraumatic  Eyes:      Conjunctiva/sclera: Conjunctivae normal    Cardiovascular:      Rate and Rhythm: Normal rate and regular rhythm  Heart sounds: No murmur heard  Pulmonary:      Effort: Pulmonary effort is normal  No respiratory distress  Breath sounds: Normal breath sounds  Abdominal:      General: Bowel sounds are normal       Palpations: Abdomen is soft  Tenderness: There is no abdominal tenderness  Musculoskeletal:         General: No swelling or tenderness  Normal range of motion  Cervical back: Neck supple  Skin:     General: Skin is warm and dry  Capillary Refill: Capillary refill takes less than 2 seconds  Neurological:      Mental Status: She is alert and oriented to person, place, and time  Mental status is at baseline  Psychiatric:         Mood and Affect: Mood normal          Behavior: Behavior normal        LABORATORY DATA     Labs: I have personally reviewed pertinent reports    Results from last 7 days   Lab Units 08/30/21  0608 08/29/21  1038   WBC Thousand/uL 4 19* 4 25*   HEMOGLOBIN g/dL 12 5 12 6   HEMATOCRIT % 39 4 38 8   PLATELETS Thousands/uL 230 251   NEUTROS PCT % 52 67   MONOS PCT % 9 8      Results from last 7 days   Lab Units 08/30/21  0608 08/29/21  1038   POTASSIUM mmol/L 3 7 4 0   CHLORIDE mmol/L 110* 113*   CO2 mmol/L 28 30   BUN mg/dL 10 11   CREATININE mg/dL 0 67 0 81   CALCIUM mg/dL 8 1* 7 9*                            IMAGING & DIAGNOSTIC TESTING     Radiology Results: I have personally reviewed pertinent reports  No results found  Other Diagnostic Testing: I have personally reviewed pertinent reports      ACTIVE MEDICATIONS     Current Facility-Administered Medications   Medication Dose Route Frequency    acetaminophen (TYLENOL) tablet 650 mg  650 mg Oral Q8H [address]    amLODIPine (NORVASC) tablet 5 mg  5 mg Oral Daily    ascorbic acid (VITAMIN C) tablet 500 mg  500 mg Oral BID    aspirin chewable tablet 81 mg  81 mg Oral Daily    atorvastatin (LIPITOR) tablet 40 mg  40 mg Oral Daily With Dinner    busPIRone (BUSPAR) tablet 15 mg  15 mg Oral TID    dextromethorphan-guaiFENesin (ROBITUSSIN DM) oral syrup 10 mL  10 mL Oral Q4H PRN    Diclofenac Sodium (VOLTAREN) 1 % topical gel 2 g  2 g Topical 4x Daily    enoxaparin (LOVENOX) subcutaneous injection 40 mg  40 mg Subcutaneous Daily    ferrous sulfate tablet 325 mg  325 mg Oral Daily With Breakfast    folic acid (FOLVITE) tablet 1,000 mcg  1,000 mcg Oral Daily    hydrOXYzine HCL (ATARAX) tablet 50 mg  50 mg Oral TID PRN    lidocaine (LIDODERM) 5 % patch 1 patch  1 patch Topical Daily    lidocaine (LIDODERM) 5 % patch 1 patch  1 patch Topical Daily    lithium carbonate (LITHOBID) CR tablet 300 mg  300 mg Oral HS    LORazepam (ATIVAN) tablet 0 5 mg  0 5 mg Oral Q8H PRN    menthol-methyl salicylate (BENGAY) 02-88 % cream   Apply externally 4x Daily PRN    methocarbamol (ROBAXIN) tablet 500 mg  500 mg Oral Q6H PRN    mirtazapine (REMERON) tablet 7 5 mg  7 5 mg Oral HS PRN    ondansetron (ZOFRAN-ODT) dispersible tablet 4 mg  4 mg Oral Q6H PRN    oxyCODONE-acetaminophen (PERCOCET) 5-325 mg per tablet 1 tablet  1 tablet Oral Q6H PRN    pantoprazole (PROTONIX) EC tablet 20 mg  20 mg Oral Early Morning    PARoxetine (PAXIL) tablet 60 mg  60 mg Oral Daily    polyethylene glycol (MIRALAX) packet 17 g  17 g Oral Daily    prazosin (MINIPRESS) capsule 2 mg  2 mg Oral Daily    pregabalin (LYRICA) capsule 100 mg  100 mg Oral TID    propranolol (INDERAL) tablet 20 mg  20 mg Oral BID before breakfast/lunch    QUEtiapine (SEROquel) tablet 50 mg  50 mg Oral 4x Daily (PC & HS)    senna-docusate sodium (SENOKOT S) 8 6-50 mg per tablet 1 tablet  1 tablet Oral Daily PRN    Valbenazine Tosylate CAPS 80 mg  80 mg Oral Daily       VTE Pharmacologic Prophylaxis: Enoxaparin (Lovenox)  VTE Mechanical Prophylaxis: sequential compression device    Portions of the record may have been created with voice recognition software  Occasional wrong word or "sound a like" substitutions may have occurred due to the inherent limitations of voice recognition software    Read the chart carefully and recognize, using context, where substitutions have occurred   ==  Mu Hung, [facility]  Internal Medicine Residency

## 2021-09-04 NOTE — PLAN OF CARE
Problem: Potential for Falls  Goal: Patient will remain free of falls  Description: INTERVENTIONS:  - Educate patient/family on patient safety including physical limitations  - Instruct patient to call for assistance with activity   - Consult OT/PT to assist with strengthening/mobility   - Keep Call bell within reach  - Keep bed low and locked with side rails adjusted as appropriate  - Keep care items and personal belongings within reach  - Initiate and maintain comfort rounds  - Make Fall Risk Sign visible to staff  - Apply yellow socks and bracelet for high fall risk patients  - Consider moving patient to room near nurses station  Outcome: Progressing     Problem: MOBILITY - ADULT  Goal: Maintain or return to baseline ADL function  Description: INTERVENTIONS:  -  Assess patient's ability to carry out ADLs; assess patient's baseline for ADL function and identify physical deficits which impact ability to perform ADLs (bathing, care of mouth/teeth, toileting, grooming, dressing, etc )  - Assess/evaluate cause of self-care deficits   - Assess range of motion  - Assess patient's mobility; develop plan if impaired  - Assess patient's need for assistive devices and provide as appropriate  - Encourage maximum independence but intervene and supervise when necessary  - Involve family in performance of ADLs  - Assess for home care needs following discharge   - Consider OT consult to assist with ADL evaluation and planning for discharge  - Provide patient education as appropriate  Outcome: Progressing  Goal: Maintains/Returns to pre admission functional level  Description: INTERVENTIONS:  - Perform BMAT or MOVE assessment daily    - Set and communicate daily mobility goal to care team and patient/family/caregiver     - Collaborate with rehabilitation services on mobility goals if consulted  - Record patient progress and toleration of activity level   Outcome: Progressing

## 2021-09-05 PROCEDURE — 99232 SBSQ HOSP IP/OBS MODERATE 35: CPT | Performed by: INTERNAL MEDICINE

## 2021-09-05 RX ADMIN — OXYCODONE HYDROCHLORIDE AND ACETAMINOPHEN 1 TABLET: 5; 325 TABLET ORAL at 21:27

## 2021-09-05 RX ADMIN — AMLODIPINE BESYLATE 5 MG: 5 TABLET ORAL at 08:25

## 2021-09-05 RX ADMIN — PAROXETINE HYDROCHLORIDE 60 MG: 20 TABLET, FILM COATED ORAL at 21:23

## 2021-09-05 RX ADMIN — PANTOPRAZOLE SODIUM 20 MG: 20 TABLET, DELAYED RELEASE ORAL at 05:23

## 2021-09-05 RX ADMIN — LORAZEPAM 0.5 MG: 0.5 TABLET ORAL at 16:22

## 2021-09-05 RX ADMIN — ACETAMINOPHEN 650 MG: 325 TABLET, FILM COATED ORAL at 14:02

## 2021-09-05 RX ADMIN — PREGABALIN 100 MG: 100 CAPSULE ORAL at 16:23

## 2021-09-05 RX ADMIN — ACETAMINOPHEN 650 MG: 325 TABLET, FILM COATED ORAL at 05:23

## 2021-09-05 RX ADMIN — ASPIRIN 81 MG CHEWABLE TABLET 81 MG: 81 TABLET CHEWABLE at 08:25

## 2021-09-05 RX ADMIN — PREGABALIN 100 MG: 100 CAPSULE ORAL at 08:26

## 2021-09-05 RX ADMIN — LORAZEPAM 0.5 MG: 0.5 TABLET ORAL at 08:24

## 2021-09-05 RX ADMIN — PROPRANOLOL HYDROCHLORIDE 20 MG: 20 TABLET ORAL at 11:49

## 2021-09-05 RX ADMIN — ACETAMINOPHEN 650 MG: 325 TABLET, FILM COATED ORAL at 21:22

## 2021-09-05 RX ADMIN — DICLOFENAC SODIUM 2 G: 10 GEL TOPICAL at 11:50

## 2021-09-05 RX ADMIN — LITHIUM CARBONATE 300 MG: 300 TABLET, FILM COATED, EXTENDED RELEASE ORAL at 21:23

## 2021-09-05 RX ADMIN — GUAIFENESIN AND DEXTROMETHORPHAN 10 ML: 100; 10 SYRUP ORAL at 10:05

## 2021-09-05 RX ADMIN — ATORVASTATIN CALCIUM 40 MG: 40 TABLET, FILM COATED ORAL at 16:23

## 2021-09-05 RX ADMIN — ENOXAPARIN SODIUM 40 MG: 40 INJECTION SUBCUTANEOUS at 08:24

## 2021-09-05 RX ADMIN — FOLIC ACID 1000 MCG: 1 TABLET ORAL at 08:25

## 2021-09-05 RX ADMIN — MIRTAZAPINE 7.5 MG: 15 TABLET, FILM COATED ORAL at 21:27

## 2021-09-05 RX ADMIN — QUETIAPINE FUMARATE 50 MG: 25 TABLET ORAL at 11:49

## 2021-09-05 RX ADMIN — OXYCODONE HYDROCHLORIDE AND ACETAMINOPHEN 1 TABLET: 5; 325 TABLET ORAL at 05:23

## 2021-09-05 RX ADMIN — BUSPIRONE HYDROCHLORIDE 15 MG: 10 TABLET ORAL at 21:22

## 2021-09-05 RX ADMIN — QUETIAPINE FUMARATE 50 MG: 25 TABLET ORAL at 08:25

## 2021-09-05 RX ADMIN — QUETIAPINE FUMARATE 50 MG: 25 TABLET ORAL at 21:22

## 2021-09-05 RX ADMIN — QUETIAPINE FUMARATE 50 MG: 25 TABLET ORAL at 16:23

## 2021-09-05 RX ADMIN — METHOCARBAMOL 500 MG: 500 TABLET, FILM COATED ORAL at 08:24

## 2021-09-05 RX ADMIN — BUSPIRONE HYDROCHLORIDE 15 MG: 10 TABLET ORAL at 16:23

## 2021-09-05 RX ADMIN — FERROUS SULFATE TAB 325 MG (65 MG ELEMENTAL FE) 325 MG: 325 (65 FE) TAB at 08:25

## 2021-09-05 RX ADMIN — MENTHOL, METHYL SALICYLATE: 10; 15 CREAM TOPICAL at 19:22

## 2021-09-05 RX ADMIN — OXYCODONE HYDROCHLORIDE AND ACETAMINOPHEN 1 TABLET: 5; 325 TABLET ORAL at 11:49

## 2021-09-05 RX ADMIN — OXYCODONE HYDROCHLORIDE AND ACETAMINOPHEN 500 MG: 500 TABLET ORAL at 16:23

## 2021-09-05 RX ADMIN — PROPRANOLOL HYDROCHLORIDE 20 MG: 20 TABLET ORAL at 08:27

## 2021-09-05 RX ADMIN — BUSPIRONE HYDROCHLORIDE 15 MG: 10 TABLET ORAL at 08:25

## 2021-09-05 RX ADMIN — PREGABALIN 100 MG: 100 CAPSULE ORAL at 21:22

## 2021-09-05 RX ADMIN — VALBENAZINE 80 MG: 80 CAPSULE ORAL at 08:24

## 2021-09-05 RX ADMIN — PRAZOSIN HYDROCHLORIDE 2 MG: 2 CAPSULE ORAL at 08:27

## 2021-09-05 NOTE — PROGRESS NOTES
INTERNAL MEDICINE RESIDENCY PROGRESS NOTE     Name: Radha Ness   Age & Sex: 60 y o  female   MRN: [id number]  Unit/Bed#: [facility] 908-01   Encounter: [id number]  Team: SOD Team A    PATIENT INFORMATION     Name: Radha Ness   Age & Sex: 60 y o  female   MRN: [id number]  Hospital Stay Days: 16    ASSESSMENT/PLAN     Principal Problem:    Ambulatory dysfunction  Active Problems:    Chronic pain syndrome    Esophageal reflux    Generalized anxiety disorder    Tardive dyskinesia    History of CVA (cerebrovascular accident)    Mixed hyperlipidemia      Mixed hyperlipidemia  Assessment & Plan  -On Atorvastatin 40 mg PO qd    History of CVA (cerebrovascular accident)  Assessment & Plan  -On Aspirin 81 mg PO qd  -On Atorvastatin 40 mg PO qd    Tardive dyskinesia  Assessment & Plan  -Continue Valbenazine 80 mg daily  -appears to have slightly improved with addition of Lyrica 100 mg t i d     Generalized anxiety disorder  Assessment & Plan  Patient is on multiple psychiatric medications and was recently evaluated by inpatient psych at [facility] with adjustments to her medications including buspirone 50 mg t i d , paroxetine 60 mg daily, quetiapine 50 mg 4 times daily, mirtazapine 7 5 mg q h s , lithium 300 mg q h s  And hydroxyzine 50 mg t i d  P r n  Ferol Francisco Javier Patient continues to have increased anxiety with daily morning panic attacks  Patient also reports that her anxiety is worsened by her lack of pain control  Plan:  · Continue medical management  · Continue current psychotropic medication   · She is psychiatrically cleared to go to SNF  · Patient could benefit from outpatient psychiatric evaluation and follow-up  · Confirmed psych medication regimen following discharge from [address] [place] [address] as noted above  Esophageal reflux  Assessment & Plan  Continue Protonix 20 mg daily  Chronic pain syndrome  Assessment & Plan  Patient has been on chronic opioids for low back and leg pain    She was seen in the clinic and discussed opioid tapering  Most recent opioid prescription per PDMP was for morphine 15 mg for 10 days (20 tablets)  Per chart review, concern for polypharmacy and suspicion of drug-seeking  · On Tylenol 650 mg q8h  · Lidocaine patch/Voltaren gel  · On Lyrica 100 mg  t i d   · Oxy IR 2 5 mg q6h prn  · Bengay topical ointment 4 times daily PRN  ·  Added Aqua K    ·  Added Robaxin  500 mg q 6h p r n     * Ambulatory dysfunction  Assessment & Plan  Patient with decreased physical mobility, at risk for falls  She uses walker and wheelchair at home  Presented to the ED requesting placement to SNF  · Encourage good body mechanics and assist with transfers  · Keep personal items and call bell close to the patient  · PT and OT- recommends post acute rehab  · Pending placement  Cough-resolved as of 2021  Assessment & Plan  Patient complains of progressive worsening cough from  to   Cough is nonproductive  Patient also reports associated chills, nasal congestion, and nausea  -COVID PCR negative  -chest x-ray unremarkable  -Robitussin for cough  -continue to trend white count and fevers    -resolved as of       Disposition: Patient is medically stable, on psychotropic medications along with appropriate pain control for her back pain  case management working on placement  SUBJECTIVE     Patient seen and examined, she reports improvement in her back pain  No acute events overnight       OBJECTIVE     Vitals:    21 0845 21 1206 21 1524 21 2219   BP: 93/56 150/98 128/86 126/82   BP Location: Right arm      Pulse:   61 60   Resp:   18 20   Temp:   99 4 °F (37 4 °C) 98 °F (36 7 °C)   TempSrc:       SpO2:   99% 97%   Weight:       Height:          Temperature:   Temp (24hrs), Av 7 °F (37 1 °C), Min:98 °F (36 7 °C), Max:99 4 °F (37 4 °C)    Temperature: 98 °F (36 7 °C)  Intake & Output:  I/O       701 -  0700 701 -  0700 09/05 0701 - 09/06 0700    P  O  520 780     Total Intake(mL/kg) 520 (5 7) 780 (8 6)     Urine (mL/kg/hr) 0 (0)      Total Output 0      Net +520 +780                Weights:   IBW (Ideal Body Weight): 47 8 kg    Body mass index is 37 99 kg/m²  Weight (last 2 days)     None        Physical Exam  Constitutional:       Appearance: She is obese  HENT:      Head: Normocephalic and atraumatic  Eyes:      Conjunctiva/sclera: Conjunctivae normal    Cardiovascular:      Rate and Rhythm: Normal rate and regular rhythm  Pulses: Normal pulses  Heart sounds: Normal heart sounds  Pulmonary:      Effort: Pulmonary effort is normal       Breath sounds: Normal breath sounds  Abdominal:      General: Bowel sounds are normal       Palpations: Abdomen is soft  Skin:     General: Skin is warm and dry  Capillary Refill: Capillary refill takes less than 2 seconds  Neurological:      Mental Status: She is alert and oriented to person, place, and time  LABORATORY DATA     Labs: I have personally reviewed pertinent reports  Results from last 7 days   Lab Units 08/30/21  0608 08/29/21  1038   WBC Thousand/uL 4 19* 4 25*   HEMOGLOBIN g/dL 12 5 12 6   HEMATOCRIT % 39 4 38 8   PLATELETS Thousands/uL 230 251   NEUTROS PCT % 52 67   MONOS PCT % 9 8      Results from last 7 days   Lab Units 08/30/21  0608 08/29/21  1038   POTASSIUM mmol/L 3 7 4 0   CHLORIDE mmol/L 110* 113*   CO2 mmol/L 28 30   BUN mg/dL 10 11   CREATININE mg/dL 0 67 0 81   CALCIUM mg/dL 8 1* 7 9*                            IMAGING & DIAGNOSTIC TESTING     Radiology Results: I have personally reviewed pertinent reports  No results found  Other Diagnostic Testing: I have personally reviewed pertinent reports      ACTIVE MEDICATIONS     Current Facility-Administered Medications   Medication Dose Route Frequency    acetaminophen (TYLENOL) tablet 650 mg  650 mg Oral Q8H [facility]    amLODIPine (NORVASC) tablet 5 mg  5 mg Oral Daily    ascorbic acid (VITAMIN C) tablet 500 mg  500 mg Oral BID    aspirin chewable tablet 81 mg  81 mg Oral Daily    atorvastatin (LIPITOR) tablet 40 mg  40 mg Oral Daily With Dinner    busPIRone (BUSPAR) tablet 15 mg  15 mg Oral TID    dextromethorphan-guaiFENesin (ROBITUSSIN DM) oral syrup 10 mL  10 mL Oral Q4H PRN    Diclofenac Sodium (VOLTAREN) 1 % topical gel 2 g  2 g Topical 4x Daily    enoxaparin (LOVENOX) subcutaneous injection 40 mg  40 mg Subcutaneous Daily    ferrous sulfate tablet 325 mg  325 mg Oral Daily With Breakfast    folic acid (FOLVITE) tablet 1,000 mcg  1,000 mcg Oral Daily    hydrOXYzine HCL (ATARAX) tablet 50 mg  50 mg Oral TID PRN    lidocaine (LIDODERM) 5 % patch 1 patch  1 patch Topical Daily    lidocaine (LIDODERM) 5 % patch 1 patch  1 patch Topical Daily    lithium carbonate (LITHOBID) CR tablet 300 mg  300 mg Oral HS    LORazepam (ATIVAN) tablet 0 5 mg  0 5 mg Oral Q8H PRN    menthol-methyl salicylate (BENGAY) 03-03 % cream   Apply externally 4x Daily PRN    methocarbamol (ROBAXIN) tablet 500 mg  500 mg Oral Q6H PRN    mirtazapine (REMERON) tablet 7 5 mg  7 5 mg Oral HS PRN    ondansetron (ZOFRAN-ODT) dispersible tablet 4 mg  4 mg Oral Q6H PRN    oxyCODONE-acetaminophen (PERCOCET) 5-325 mg per tablet 1 tablet  1 tablet Oral Q6H PRN    pantoprazole (PROTONIX) EC tablet 20 mg  20 mg Oral Early Morning    PARoxetine (PAXIL) tablet 60 mg  60 mg Oral Daily    polyethylene glycol (MIRALAX) packet 17 g  17 g Oral Daily    prazosin (MINIPRESS) capsule 2 mg  2 mg Oral Daily    pregabalin (LYRICA) capsule 100 mg  100 mg Oral TID    propranolol (INDERAL) tablet 20 mg  20 mg Oral BID before breakfast/lunch    QUEtiapine (SEROquel) tablet 50 mg  50 mg Oral 4x Daily (PC & HS)    senna-docusate sodium (SENOKOT S) 8 6-50 mg per tablet 1 tablet  1 tablet Oral Daily PRN    Valbenazine Tosylate CAPS 80 mg  80 mg Oral Daily       VTE Pharmacologic Prophylaxis: Enoxaparin (Lovenox)  VTE Mechanical Prophylaxis: sequential compression device    Portions of the record may have been created with voice recognition software  Occasional wrong word or "sound a like" substitutions may have occurred due to the inherent limitations of voice recognition software    Read the chart carefully and recognize, using context, where substitutions have occurred   ==  Lydia Giron MD  [address]  Internal Medicine Residency PGY-1

## 2021-09-06 LAB
ANION GAP SERPL CALCULATED.3IONS-SCNC: 2 MMOL/L (ref 4–13)
BASOPHILS # BLD AUTO: 0.03 THOUSANDS/ΜL (ref 0–0.1)
BASOPHILS NFR BLD AUTO: 1 % (ref 0–1)
BUN SERPL-MCNC: 6 MG/DL (ref 5–25)
CALCIUM SERPL-MCNC: 8.2 MG/DL (ref 8.3–10.1)
CHLORIDE SERPL-SCNC: 109 MMOL/L (ref 100–108)
CO2 SERPL-SCNC: 31 MMOL/L (ref 21–32)
CREAT SERPL-MCNC: 0.63 MG/DL (ref 0.6–1.3)
EOSINOPHIL # BLD AUTO: 0.19 THOUSAND/ΜL (ref 0–0.61)
EOSINOPHIL NFR BLD AUTO: 5 % (ref 0–6)
ERYTHROCYTE [DISTWIDTH] IN BLOOD BY AUTOMATED COUNT: 13.9 % (ref 11.6–15.1)
GFR SERPL CREATININE-BSD FRML MDRD: 115 ML/MIN/1.73SQ M
GLUCOSE SERPL-MCNC: 98 MG/DL (ref 65–140)
HCT VFR BLD AUTO: 38.8 % (ref 34.8–46.1)
HGB BLD-MCNC: 12.6 G/DL (ref 11.5–15.4)
IMM GRANULOCYTES # BLD AUTO: 0.01 THOUSAND/UL (ref 0–0.2)
IMM GRANULOCYTES NFR BLD AUTO: 0 % (ref 0–2)
LYMPHOCYTES # BLD AUTO: 0.95 THOUSANDS/ΜL (ref 0.6–4.47)
LYMPHOCYTES NFR BLD AUTO: 24 % (ref 14–44)
MCH RBC QN AUTO: 29.6 PG (ref 26.8–34.3)
MCHC RBC AUTO-ENTMCNC: 32.5 G/DL (ref 31.4–37.4)
MCV RBC AUTO: 91 FL (ref 82–98)
MONOCYTES # BLD AUTO: 0.33 THOUSAND/ΜL (ref 0.17–1.22)
MONOCYTES NFR BLD AUTO: 8 % (ref 4–12)
NEUTROPHILS # BLD AUTO: 2.48 THOUSANDS/ΜL (ref 1.85–7.62)
NEUTS SEG NFR BLD AUTO: 62 % (ref 43–75)
NRBC BLD AUTO-RTO: 0 /100 WBCS
PLATELET # BLD AUTO: 214 THOUSANDS/UL (ref 149–390)
PMV BLD AUTO: 9.8 FL (ref 8.9–12.7)
POTASSIUM SERPL-SCNC: 3.7 MMOL/L (ref 3.5–5.3)
RBC # BLD AUTO: 4.25 MILLION/UL (ref 3.81–5.12)
SODIUM SERPL-SCNC: 142 MMOL/L (ref 136–145)
WBC # BLD AUTO: 3.99 THOUSAND/UL (ref 4.31–10.16)

## 2021-09-06 PROCEDURE — 99232 SBSQ HOSP IP/OBS MODERATE 35: CPT | Performed by: INTERNAL MEDICINE

## 2021-09-06 PROCEDURE — 80048 BASIC METABOLIC PNL TOTAL CA: CPT | Performed by: STUDENT IN AN ORGANIZED HEALTH CARE EDUCATION/TRAINING PROGRAM

## 2021-09-06 PROCEDURE — 85025 COMPLETE CBC W/AUTO DIFF WBC: CPT | Performed by: STUDENT IN AN ORGANIZED HEALTH CARE EDUCATION/TRAINING PROGRAM

## 2021-09-06 RX ADMIN — OXYCODONE HYDROCHLORIDE AND ACETAMINOPHEN 1 TABLET: 5; 325 TABLET ORAL at 05:57

## 2021-09-06 RX ADMIN — OXYCODONE HYDROCHLORIDE AND ACETAMINOPHEN 1 TABLET: 5; 325 TABLET ORAL at 16:43

## 2021-09-06 RX ADMIN — ENOXAPARIN SODIUM 40 MG: 40 INJECTION SUBCUTANEOUS at 09:56

## 2021-09-06 RX ADMIN — PROPRANOLOL HYDROCHLORIDE 20 MG: 20 TABLET ORAL at 06:01

## 2021-09-06 RX ADMIN — DICLOFENAC SODIUM 2 G: 10 GEL TOPICAL at 20:33

## 2021-09-06 RX ADMIN — DICLOFENAC SODIUM 2 G: 10 GEL TOPICAL at 11:19

## 2021-09-06 RX ADMIN — FOLIC ACID 1000 MCG: 1 TABLET ORAL at 09:56

## 2021-09-06 RX ADMIN — METHOCARBAMOL 500 MG: 500 TABLET, FILM COATED ORAL at 09:56

## 2021-09-06 RX ADMIN — HYDROXYZINE HYDROCHLORIDE 50 MG: 25 TABLET, FILM COATED ORAL at 19:31

## 2021-09-06 RX ADMIN — BUSPIRONE HYDROCHLORIDE 15 MG: 10 TABLET ORAL at 16:37

## 2021-09-06 RX ADMIN — QUETIAPINE FUMARATE 50 MG: 25 TABLET ORAL at 11:17

## 2021-09-06 RX ADMIN — PREGABALIN 100 MG: 100 CAPSULE ORAL at 16:38

## 2021-09-06 RX ADMIN — HYDROXYZINE HYDROCHLORIDE 50 MG: 25 TABLET, FILM COATED ORAL at 11:16

## 2021-09-06 RX ADMIN — QUETIAPINE FUMARATE 50 MG: 25 TABLET ORAL at 22:24

## 2021-09-06 RX ADMIN — BUSPIRONE HYDROCHLORIDE 15 MG: 10 TABLET ORAL at 20:29

## 2021-09-06 RX ADMIN — PRAZOSIN HYDROCHLORIDE 2 MG: 2 CAPSULE ORAL at 10:09

## 2021-09-06 RX ADMIN — OXYCODONE HYDROCHLORIDE AND ACETAMINOPHEN 500 MG: 500 TABLET ORAL at 09:56

## 2021-09-06 RX ADMIN — DICLOFENAC SODIUM 2 G: 10 GEL TOPICAL at 06:00

## 2021-09-06 RX ADMIN — ACETAMINOPHEN 650 MG: 325 TABLET, FILM COATED ORAL at 22:24

## 2021-09-06 RX ADMIN — QUETIAPINE FUMARATE 50 MG: 25 TABLET ORAL at 09:56

## 2021-09-06 RX ADMIN — MIRTAZAPINE 7.5 MG: 15 TABLET, FILM COATED ORAL at 20:33

## 2021-09-06 RX ADMIN — VALBENAZINE 80 MG: 80 CAPSULE ORAL at 10:09

## 2021-09-06 RX ADMIN — OXYCODONE HYDROCHLORIDE AND ACETAMINOPHEN 500 MG: 500 TABLET ORAL at 16:38

## 2021-09-06 RX ADMIN — QUETIAPINE FUMARATE 50 MG: 25 TABLET ORAL at 16:38

## 2021-09-06 RX ADMIN — PROPRANOLOL HYDROCHLORIDE 20 MG: 20 TABLET ORAL at 11:16

## 2021-09-06 RX ADMIN — LORAZEPAM 0.5 MG: 0.5 TABLET ORAL at 09:56

## 2021-09-06 RX ADMIN — METHOCARBAMOL 500 MG: 500 TABLET, FILM COATED ORAL at 20:29

## 2021-09-06 RX ADMIN — LITHIUM CARBONATE 300 MG: 300 TABLET, FILM COATED, EXTENDED RELEASE ORAL at 22:24

## 2021-09-06 RX ADMIN — PAROXETINE HYDROCHLORIDE 60 MG: 20 TABLET, FILM COATED ORAL at 20:35

## 2021-09-06 RX ADMIN — MENTHOL, METHYL SALICYLATE: 10; 15 CREAM TOPICAL at 13:30

## 2021-09-06 RX ADMIN — ASPIRIN 81 MG CHEWABLE TABLET 81 MG: 81 TABLET CHEWABLE at 09:56

## 2021-09-06 RX ADMIN — ACETAMINOPHEN 650 MG: 325 TABLET, FILM COATED ORAL at 05:57

## 2021-09-06 RX ADMIN — LORAZEPAM 0.5 MG: 0.5 TABLET ORAL at 18:50

## 2021-09-06 RX ADMIN — ACETAMINOPHEN 650 MG: 325 TABLET, FILM COATED ORAL at 13:30

## 2021-09-06 RX ADMIN — ATORVASTATIN CALCIUM 40 MG: 40 TABLET, FILM COATED ORAL at 16:38

## 2021-09-06 RX ADMIN — PANTOPRAZOLE SODIUM 20 MG: 20 TABLET, DELAYED RELEASE ORAL at 05:57

## 2021-09-06 RX ADMIN — PREGABALIN 100 MG: 100 CAPSULE ORAL at 09:56

## 2021-09-06 RX ADMIN — AMLODIPINE BESYLATE 5 MG: 5 TABLET ORAL at 09:56

## 2021-09-06 RX ADMIN — PREGABALIN 100 MG: 100 CAPSULE ORAL at 20:29

## 2021-09-06 RX ADMIN — BUSPIRONE HYDROCHLORIDE 15 MG: 10 TABLET ORAL at 09:56

## 2021-09-06 RX ADMIN — FERROUS SULFATE TAB 325 MG (65 MG ELEMENTAL FE) 325 MG: 325 (65 FE) TAB at 09:56

## 2021-09-06 NOTE — PROGRESS NOTES
INTERNAL MEDICINE RESIDENCY PROGRESS NOTE     Name: Surjit Srinivasan   Age & Sex: 60 y o  female   MRN: [id number]  Unit/Bed#: [facility] 908-01   Encounter: [id number]  Team: SOD Team A    PATIENT INFORMATION     Name: Surjit Srinivasan   Age & Sex: 60 y o  female   MRN: [id number]  Hospital Stay Days: 16    ASSESSMENT/PLAN     Principal Problem:    Ambulatory dysfunction  Active Problems:    Chronic pain syndrome    Esophageal reflux    Generalized anxiety disorder    Tardive dyskinesia    History of CVA (cerebrovascular accident)    Mixed hyperlipidemia      Mixed hyperlipidemia  Assessment & Plan  -On Atorvastatin 40 mg PO qd    History of CVA (cerebrovascular accident)  Assessment & Plan  -On Aspirin 81 mg PO qd  -On Atorvastatin 40 mg PO qd    Tardive dyskinesia  Assessment & Plan  -Continue Valbenazine 80 mg daily  -appears to have slightly improved with addition of Lyrica 100 mg t i d     Generalized anxiety disorder  Assessment & Plan  Patient is on multiple psychiatric medications and was recently evaluated by inpatient psych at [facility] with adjustments to her medications including buspirone 50 mg t i d , paroxetine 60 mg daily, quetiapine 50 mg 4 times daily, mirtazapine 7 5 mg q h s , lithium 300 mg q h s  And hydroxyzine 50 mg t i d  P r n  Rhonda Ponce Patient continues to have increased anxiety with daily morning panic attacks  Patient also reports that her anxiety is worsened by her lack of pain control  Plan:  · Continue medical management  · Continue current psychotropic medication   · She is psychiatrically cleared to go to SNF  · Patient could benefit from outpatient psychiatric evaluation and follow-up  · Confirmed psych medication regimen following discharge from [address] [place] [address] as noted above  Esophageal reflux  Assessment & Plan  Continue Protonix 20 mg daily  Chronic pain syndrome  Assessment & Plan  Patient has been on chronic opioids for low back and leg pain    She was seen in the clinic and discussed opioid tapering  Most recent opioid prescription per PDMP was for morphine 15 mg for 10 days (20 tablets)  Per chart review, concern for polypharmacy and suspicion of drug-seeking  · On Tylenol 650 mg q8h  · Lidocaine patch/Voltaren gel  · On Lyrica 100 mg  t i d   · Oxy IR 2 5 mg q6h prn  · Bengay topical ointment 4 times daily PRN  ·  Added Aqua K    ·  Added Robaxin  500 mg q 6h p r n     * Ambulatory dysfunction  Assessment & Plan  Patient with decreased physical mobility, at risk for falls  She uses walker and wheelchair at home  Presented to the ED requesting placement to SNF  · Encourage good body mechanics and assist with transfers  · Keep personal items and call bell close to the patient  · PT and OT- recommends post acute rehab  · Pending placement  Cough-resolved as of 2021  Assessment & Plan  Patient complains of progressive worsening cough from  to   Cough is nonproductive  Patient also reports associated chills, nasal congestion, and nausea  -COVID PCR negative  -chest x-ray unremarkable  -Robitussin for cough  -continue to trend white count and fevers    -resolved as of         Disposition: Patient is medically stable, on psychotropic medications along with appropriate pain control for her back pain  Case management working on placement  SUBJECTIVE     Patient seen and examined  She reports improvement in her back pain  Has mild anxiety but states that it is being well managed with her medications  No acute events overnight      OBJECTIVE     Vitals:    21 0813 21 0917 21 1507 21 2144   BP: 136/84 121/77 132/84 132/88   Pulse: 64 71 58 69   Resp:  18 18 16   Temp: 97 8 °F (36 6 °C) 98 1 °F (36 7 °C) 98 1 °F (36 7 °C) 98 2 °F (36 8 °C)   TempSrc: Oral      SpO2: 99% 99% 97% 91%   Weight:       Height:          Temperature:   Temp (24hrs), Av 1 °F (36 7 °C), Min:97 8 °F (36 6 °C), Max:98 2 °F (36 8 °C)    Temperature: 98 2 °F (36 8 °C)  Intake & Output:  I/O       09/04 0701 - 09/05 0700 09/05 0701 - 09/06 0700    P  O  780 860    Total Intake(mL/kg) 780 (8 6) 860 (9 4)    Net +780 +860          Unmeasured Urine Occurrence  2 x        Weights:   IBW (Ideal Body Weight): 47 8 kg    Body mass index is 37 99 kg/m²  Weight (last 2 days)     None        Physical Exam  Vitals reviewed  HENT:      Head: Normocephalic and atraumatic  Eyes:      Conjunctiva/sclera: Conjunctivae normal    Cardiovascular:      Rate and Rhythm: Normal rate and regular rhythm  Pulses: Normal pulses  Heart sounds: Normal heart sounds  Pulmonary:      Effort: Pulmonary effort is normal       Breath sounds: Normal breath sounds  Abdominal:      General: Bowel sounds are normal       Palpations: Abdomen is soft  Skin:     General: Skin is warm and dry  Capillary Refill: Capillary refill takes less than 2 seconds  Neurological:      Mental Status: She is alert and oriented to person, place, and time  Mental status is at baseline  LABORATORY DATA     Labs: I have personally reviewed pertinent reports  Results from last 7 days   Lab Units 08/30/21  0608   WBC Thousand/uL 4 19*   HEMOGLOBIN g/dL 12 5   HEMATOCRIT % 39 4   PLATELETS Thousands/uL 230   NEUTROS PCT % 52   MONOS PCT % 9      Results from last 7 days   Lab Units 08/30/21  0608   POTASSIUM mmol/L 3 7   CHLORIDE mmol/L 110*   CO2 mmol/L 28   BUN mg/dL 10   CREATININE mg/dL 0 67   CALCIUM mg/dL 8 1*                            IMAGING & DIAGNOSTIC TESTING     Radiology Results: I have personally reviewed pertinent reports  No results found  Other Diagnostic Testing: I have personally reviewed pertinent reports      ACTIVE MEDICATIONS     Current Facility-Administered Medications   Medication Dose Route Frequency    acetaminophen (TYLENOL) tablet 650 mg  650 mg Oral Q8H [address]    amLODIPine (NORVASC) tablet 5 mg  5 mg Oral Daily    ascorbic acid (VITAMIN C) tablet 500 mg  500 mg Oral BID    aspirin chewable tablet 81 mg  81 mg Oral Daily    atorvastatin (LIPITOR) tablet 40 mg  40 mg Oral Daily With Dinner    busPIRone (BUSPAR) tablet 15 mg  15 mg Oral TID    dextromethorphan-guaiFENesin (ROBITUSSIN DM) oral syrup 10 mL  10 mL Oral Q4H PRN    Diclofenac Sodium (VOLTAREN) 1 % topical gel 2 g  2 g Topical 4x Daily    enoxaparin (LOVENOX) subcutaneous injection 40 mg  40 mg Subcutaneous Daily    ferrous sulfate tablet 325 mg  325 mg Oral Daily With Breakfast    folic acid (FOLVITE) tablet 1,000 mcg  1,000 mcg Oral Daily    hydrOXYzine HCL (ATARAX) tablet 50 mg  50 mg Oral TID PRN    lidocaine (LIDODERM) 5 % patch 1 patch  1 patch Topical Daily    lidocaine (LIDODERM) 5 % patch 1 patch  1 patch Topical Daily    lithium carbonate (LITHOBID) CR tablet 300 mg  300 mg Oral HS    LORazepam (ATIVAN) tablet 0 5 mg  0 5 mg Oral Q8H PRN    menthol-methyl salicylate (BENGAY) 03-07 % cream   Apply externally 4x Daily PRN    methocarbamol (ROBAXIN) tablet 500 mg  500 mg Oral Q6H PRN    mirtazapine (REMERON) tablet 7 5 mg  7 5 mg Oral HS PRN    ondansetron (ZOFRAN-ODT) dispersible tablet 4 mg  4 mg Oral Q6H PRN    oxyCODONE-acetaminophen (PERCOCET) 5-325 mg per tablet 1 tablet  1 tablet Oral Q6H PRN    pantoprazole (PROTONIX) EC tablet 20 mg  20 mg Oral Early Morning    PARoxetine (PAXIL) tablet 60 mg  60 mg Oral Daily    polyethylene glycol (MIRALAX) packet 17 g  17 g Oral Daily    prazosin (MINIPRESS) capsule 2 mg  2 mg Oral Daily    pregabalin (LYRICA) capsule 100 mg  100 mg Oral TID    propranolol (INDERAL) tablet 20 mg  20 mg Oral BID before breakfast/lunch    QUEtiapine (SEROquel) tablet 50 mg  50 mg Oral 4x Daily (PC & HS)    senna-docusate sodium (SENOKOT S) 8 6-50 mg per tablet 1 tablet  1 tablet Oral Daily PRN    Valbenazine Tosylate CAPS 80 mg  80 mg Oral Daily       VTE Pharmacologic Prophylaxis: Enoxaparin (Lovenox)  VTE Mechanical Prophylaxis: sequential compression device    Portions of the record may have been created with voice recognition software  Occasional wrong word or "sound a like" substitutions may have occurred due to the inherent limitations of voice recognition software    Read the chart carefully and recognize, using context, where substitutions have occurred   ==  Bryant Rabago MD  [address]  Internal Medicine Residency PGY-1

## 2021-09-07 ENCOUNTER — PATIENT OUTREACH (OUTPATIENT)
Dept: INTERNAL MEDICINE CLINIC | Facility: CLINIC | Age: 56
End: 2021-09-07

## 2021-09-07 PROCEDURE — 97535 SELF CARE MNGMENT TRAINING: CPT

## 2021-09-07 PROCEDURE — 99232 SBSQ HOSP IP/OBS MODERATE 35: CPT | Performed by: INTERNAL MEDICINE

## 2021-09-07 RX ADMIN — MIRTAZAPINE 7.5 MG: 15 TABLET, FILM COATED ORAL at 23:01

## 2021-09-07 RX ADMIN — HYDROXYZINE HYDROCHLORIDE 50 MG: 25 TABLET, FILM COATED ORAL at 15:36

## 2021-09-07 RX ADMIN — FOLIC ACID 1000 MCG: 1 TABLET ORAL at 09:08

## 2021-09-07 RX ADMIN — BUSPIRONE HYDROCHLORIDE 15 MG: 10 TABLET ORAL at 21:01

## 2021-09-07 RX ADMIN — ASPIRIN 81 MG CHEWABLE TABLET 81 MG: 81 TABLET CHEWABLE at 09:08

## 2021-09-07 RX ADMIN — QUETIAPINE FUMARATE 50 MG: 25 TABLET ORAL at 09:08

## 2021-09-07 RX ADMIN — ACETAMINOPHEN 650 MG: 325 TABLET, FILM COATED ORAL at 13:22

## 2021-09-07 RX ADMIN — OXYCODONE HYDROCHLORIDE AND ACETAMINOPHEN 1 TABLET: 5; 325 TABLET ORAL at 06:33

## 2021-09-07 RX ADMIN — FERROUS SULFATE TAB 325 MG (65 MG ELEMENTAL FE) 325 MG: 325 (65 FE) TAB at 09:08

## 2021-09-07 RX ADMIN — BUSPIRONE HYDROCHLORIDE 15 MG: 10 TABLET ORAL at 16:56

## 2021-09-07 RX ADMIN — ENOXAPARIN SODIUM 40 MG: 40 INJECTION SUBCUTANEOUS at 09:09

## 2021-09-07 RX ADMIN — GUAIFENESIN AND DEXTROMETHORPHAN 10 ML: 100; 10 SYRUP ORAL at 08:39

## 2021-09-07 RX ADMIN — BUSPIRONE HYDROCHLORIDE 15 MG: 10 TABLET ORAL at 09:08

## 2021-09-07 RX ADMIN — METHOCARBAMOL 500 MG: 500 TABLET, FILM COATED ORAL at 15:36

## 2021-09-07 RX ADMIN — PRAZOSIN HYDROCHLORIDE 2 MG: 2 CAPSULE ORAL at 09:09

## 2021-09-07 RX ADMIN — ACETAMINOPHEN 650 MG: 325 TABLET, FILM COATED ORAL at 06:34

## 2021-09-07 RX ADMIN — ACETAMINOPHEN 650 MG: 325 TABLET, FILM COATED ORAL at 21:01

## 2021-09-07 RX ADMIN — ATORVASTATIN CALCIUM 40 MG: 40 TABLET, FILM COATED ORAL at 16:56

## 2021-09-07 RX ADMIN — PANTOPRAZOLE SODIUM 20 MG: 20 TABLET, DELAYED RELEASE ORAL at 06:34

## 2021-09-07 RX ADMIN — OXYCODONE HYDROCHLORIDE AND ACETAMINOPHEN 500 MG: 500 TABLET ORAL at 09:08

## 2021-09-07 RX ADMIN — QUETIAPINE FUMARATE 50 MG: 25 TABLET ORAL at 21:01

## 2021-09-07 RX ADMIN — OXYCODONE HYDROCHLORIDE AND ACETAMINOPHEN 1 TABLET: 5; 325 TABLET ORAL at 23:03

## 2021-09-07 RX ADMIN — DICLOFENAC SODIUM 2 G: 10 GEL TOPICAL at 09:09

## 2021-09-07 RX ADMIN — QUETIAPINE FUMARATE 50 MG: 25 TABLET ORAL at 16:56

## 2021-09-07 RX ADMIN — PAROXETINE HYDROCHLORIDE 60 MG: 20 TABLET, FILM COATED ORAL at 21:02

## 2021-09-07 RX ADMIN — PREGABALIN 100 MG: 100 CAPSULE ORAL at 16:56

## 2021-09-07 RX ADMIN — QUETIAPINE FUMARATE 50 MG: 25 TABLET ORAL at 11:28

## 2021-09-07 RX ADMIN — HYDROXYZINE HYDROCHLORIDE 50 MG: 25 TABLET, FILM COATED ORAL at 09:08

## 2021-09-07 RX ADMIN — AMLODIPINE BESYLATE 5 MG: 5 TABLET ORAL at 09:08

## 2021-09-07 RX ADMIN — LITHIUM CARBONATE 300 MG: 300 TABLET, FILM COATED, EXTENDED RELEASE ORAL at 21:02

## 2021-09-07 RX ADMIN — VALBENAZINE 80 MG: 80 CAPSULE ORAL at 09:09

## 2021-09-07 RX ADMIN — LORAZEPAM 0.5 MG: 0.5 TABLET ORAL at 08:39

## 2021-09-07 RX ADMIN — METHOCARBAMOL 500 MG: 500 TABLET, FILM COATED ORAL at 09:08

## 2021-09-07 RX ADMIN — PREGABALIN 100 MG: 100 CAPSULE ORAL at 23:03

## 2021-09-07 RX ADMIN — LORAZEPAM 0.5 MG: 0.5 TABLET ORAL at 16:56

## 2021-09-07 RX ADMIN — DICLOFENAC SODIUM 2 G: 10 GEL TOPICAL at 16:57

## 2021-09-07 RX ADMIN — PREGABALIN 100 MG: 100 CAPSULE ORAL at 09:08

## 2021-09-07 RX ADMIN — OXYCODONE HYDROCHLORIDE AND ACETAMINOPHEN 500 MG: 500 TABLET ORAL at 16:57

## 2021-09-07 RX ADMIN — PROPRANOLOL HYDROCHLORIDE 20 MG: 20 TABLET ORAL at 06:35

## 2021-09-07 RX ADMIN — OXYCODONE HYDROCHLORIDE AND ACETAMINOPHEN 1 TABLET: 5; 325 TABLET ORAL at 16:56

## 2021-09-07 NOTE — PROGRESS NOTES
INTERNAL MEDICINE RESIDENCY PROGRESS NOTE     Name: Elyssa Frost   Age & Sex: 60 y o  female   MRN: [id number]  Unit/Bed#: [facility] 908-01   Encounter: [id number]  Team: SOD Team A    PATIENT INFORMATION     Name: Elyssa Frost   Age & Sex: 60 y o  female   MRN: [id number]  Hospital Stay Days: 25    ASSESSMENT/PLAN     Principal Problem:    Ambulatory dysfunction  Active Problems:    Chronic pain syndrome    Esophageal reflux    Generalized anxiety disorder    Tardive dyskinesia    History of CVA (cerebrovascular accident)    Mixed hyperlipidemia      Mixed hyperlipidemia  Assessment & Plan  -On Atorvastatin 40 mg PO qd    History of CVA (cerebrovascular accident)  Assessment & Plan  -On Aspirin 81 mg PO qd  -On Atorvastatin 40 mg PO qd    Tardive dyskinesia  Assessment & Plan  -Continue Valbenazine 80 mg daily  -appears to have slightly improved with addition of Lyrica 100 mg t i d     Generalized anxiety disorder  Assessment & Plan  Patient is on multiple psychiatric medications and was recently evaluated by inpatient psych at [facility] with adjustments to her medications including buspirone 50 mg t i d , paroxetine 60 mg daily, quetiapine 50 mg 4 times daily, mirtazapine 7 5 mg q h s , lithium 300 mg q h s  And hydroxyzine 50 mg t i d  P r n  Georgette Ormond Patient continues to have increased anxiety with daily morning panic attacks  Patient also reports that her anxiety is worsened by her lack of pain control  Plan:  · Continue medical management  · Continue current psychotropic medication   · She is psychiatrically cleared to go to SNF  · Patient could benefit from outpatient psychiatric evaluation and follow-up  · Confirmed psych medication regimen following discharge from [address] [place] [address] as noted above  Esophageal reflux  Assessment & Plan  Continue Protonix 20 mg daily  Chronic pain syndrome  Assessment & Plan  Patient has been on chronic opioids for low back and leg pain    She was seen in the clinic and discussed opioid tapering  Most recent opioid prescription per PDMP was for morphine 15 mg for 10 days (20 tablets)  Per chart review, concern for polypharmacy and suspicion of drug-seeking  · On Tylenol 650 mg q8h  · Lidocaine patch/Voltaren gel  · On Lyrica 100 mg  t i d   · Oxy IR 2 5 mg q6h prn  · Bengay topical ointment 4 times daily PRN  ·  Added Aqua K    ·  Added Robaxin  500 mg q 6h p r n     * Ambulatory dysfunction  Assessment & Plan  Patient with decreased physical mobility, at risk for falls  She uses walker and wheelchair at home  Presented to the ED requesting placement to SNF  · Encourage good body mechanics and assist with transfers  · Keep personal items and call bell close to the patient  · PT and OT- recommends post acute rehab  · Pending placement  Cough-resolved as of 2021  Assessment & Plan  Patient complains of progressive worsening cough from  to   Cough is nonproductive  Patient also reports associated chills, nasal congestion, and nausea  -COVID PCR negative  -chest x-ray unremarkable  -Robitussin for cough  -continue to trend white count and fevers    -resolved as of         Disposition: Patient is medically stable, on psychotropic medications along with appropriate pain control for her back pain  Case management working on placement  SUBJECTIVE     Patient seen and examined  She reports improvement in her back pain  Has mild anxiety but states that it is being well managed with her medications  No acute events overnight      OBJECTIVE     Vitals:    21 1535 21 2210 21 0634 21 0635   BP: 129/81 127/81 126/80    Pulse:    72   Resp: 16 16     Temp: 98 °F (36 7 °C) (!) 97 3 °F (36 3 °C)     TempSrc:       SpO2:       Weight:       Height:          Temperature:   Temp (24hrs), Av 7 °F (36 5 °C), Min:97 3 °F (36 3 °C), Max:98 °F (36 7 °C)    Temperature: (!) 97 3 °F (36 3 °C)  Intake & Output:  I/O 09/05 0701 - 09/06 0700 09/06 0701 - 09/07 0700    P  O  860 240    Total Intake(mL/kg) 860 (9 4) 240 (2 6)    Urine (mL/kg/hr)  1000 (0 5)    Total Output  1000    Net +860 -760          Unmeasured Urine Occurrence 2 x 8 x    Unmeasured Stool Occurrence  1 x        Weights:   IBW (Ideal Body Weight): 47 8 kg    Body mass index is 37 99 kg/m²  Weight (last 2 days)     None        Physical Exam  HENT:      Head: Normocephalic and atraumatic  Eyes:      Conjunctiva/sclera: Conjunctivae normal    Cardiovascular:      Rate and Rhythm: Normal rate and regular rhythm  Pulses: Normal pulses  Heart sounds: Normal heart sounds  Pulmonary:      Effort: Pulmonary effort is normal       Breath sounds: Normal breath sounds  Abdominal:      General: Bowel sounds are normal       Palpations: Abdomen is soft  Skin:     General: Skin is warm and dry  Capillary Refill: Capillary refill takes less than 2 seconds  Neurological:      Mental Status: She is alert and oriented to person, place, and time  Mental status is at baseline  LABORATORY DATA     Labs: I have personally reviewed pertinent reports  Results from last 7 days   Lab Units 09/06/21  0743   WBC Thousand/uL 3 99*   HEMOGLOBIN g/dL 12 6   HEMATOCRIT % 38 8   PLATELETS Thousands/uL 214   NEUTROS PCT % 62   MONOS PCT % 8      Results from last 7 days   Lab Units 09/06/21  0743   POTASSIUM mmol/L 3 7   CHLORIDE mmol/L 109*   CO2 mmol/L 31   BUN mg/dL 6   CREATININE mg/dL 0 63   CALCIUM mg/dL 8 2*                            IMAGING & DIAGNOSTIC TESTING     Radiology Results: I have personally reviewed pertinent reports  No results found  Other Diagnostic Testing: I have personally reviewed pertinent reports      ACTIVE MEDICATIONS     Current Facility-Administered Medications   Medication Dose Route Frequency    acetaminophen (TYLENOL) tablet 650 mg  650 mg Oral Q8H [facility]    amLODIPine (NORVASC) tablet 5 mg  5 mg Oral Daily    ascorbic acid (VITAMIN C) tablet 500 mg  500 mg Oral BID    aspirin chewable tablet 81 mg  81 mg Oral Daily    atorvastatin (LIPITOR) tablet 40 mg  40 mg Oral Daily With Dinner    busPIRone (BUSPAR) tablet 15 mg  15 mg Oral TID    dextromethorphan-guaiFENesin (ROBITUSSIN DM) oral syrup 10 mL  10 mL Oral Q4H PRN    Diclofenac Sodium (VOLTAREN) 1 % topical gel 2 g  2 g Topical 4x Daily    enoxaparin (LOVENOX) subcutaneous injection 40 mg  40 mg Subcutaneous Daily    ferrous sulfate tablet 325 mg  325 mg Oral Daily With Breakfast    folic acid (FOLVITE) tablet 1,000 mcg  1,000 mcg Oral Daily    hydrOXYzine HCL (ATARAX) tablet 50 mg  50 mg Oral TID PRN    lidocaine (LIDODERM) 5 % patch 1 patch  1 patch Topical Daily    lidocaine (LIDODERM) 5 % patch 1 patch  1 patch Topical Daily    lithium carbonate (LITHOBID) CR tablet 300 mg  300 mg Oral HS    LORazepam (ATIVAN) tablet 0 5 mg  0 5 mg Oral Q8H PRN    menthol-methyl salicylate (BENGAY) 71-29 % cream   Apply externally 4x Daily PRN    methocarbamol (ROBAXIN) tablet 500 mg  500 mg Oral Q6H PRN    mirtazapine (REMERON) tablet 7 5 mg  7 5 mg Oral HS PRN    ondansetron (ZOFRAN-ODT) dispersible tablet 4 mg  4 mg Oral Q6H PRN    oxyCODONE-acetaminophen (PERCOCET) 5-325 mg per tablet 1 tablet  1 tablet Oral Q6H PRN    pantoprazole (PROTONIX) EC tablet 20 mg  20 mg Oral Early Morning    PARoxetine (PAXIL) tablet 60 mg  60 mg Oral Daily    polyethylene glycol (MIRALAX) packet 17 g  17 g Oral Daily    prazosin (MINIPRESS) capsule 2 mg  2 mg Oral Daily    pregabalin (LYRICA) capsule 100 mg  100 mg Oral TID    propranolol (INDERAL) tablet 20 mg  20 mg Oral BID before breakfast/lunch    QUEtiapine (SEROquel) tablet 50 mg  50 mg Oral 4x Daily (PC & HS)    senna-docusate sodium (SENOKOT S) 8 6-50 mg per tablet 1 tablet  1 tablet Oral Daily PRN    Valbenazine Tosylate CAPS 80 mg  80 mg Oral Daily       VTE Pharmacologic Prophylaxis: Enoxaparin (Lovenox)  VTE Mechanical Prophylaxis: sequential compression device    Portions of the record may have been created with voice recognition software  Occasional wrong word or "sound a like" substitutions may have occurred due to the inherent limitations of voice recognition software    Read the chart carefully and recognize, using context, where substitutions have occurred   ==  Carolyn Quezada MD  [address]  Internal Medicine Residency PGY-1

## 2021-09-07 NOTE — PROGRESS NOTES
Outpatient Care Management Note:    Chart review completed  Patient currently admitted 8/17/21 - present for ambulatory dysfunction  Inpatient case management working on finding an accepting facility for patient to be discharged to

## 2021-09-07 NOTE — PLAN OF CARE
Problem: OCCUPATIONAL THERAPY ADULT  Goal: Performs self-care activities at highest level of function for planned discharge setting  See evaluation for individualized goals  Description: Treatment Interventions: ADL retraining, Functional transfer training, Endurance training, Cognitive reorientation, Patient/family training, Compensatory technique education, Continued evaluation          See flowsheet documentation for full assessment, interventions and recommendations  Outcome: Progressing  Note: Limitation: Decreased ADL status, Decreased Safe judgement during ADL, Decreased endurance, Decreased cognition, Decreased high-level ADLs, Decreased self-care trans  Prognosis: Good  Assessment: Upon arrival of OT, pt was supine in bed with bed alarm on  Pt participated in skilled OT session this date with interventions consisting of ADL re training with the use of correct body mechnaics, Energy Conservation techniques, safety awareness and fall prevention techniques and  functional ambulation of SHHD*   In comparison to previous session, pt demonstrates improvements in activity tolerance  Pt continues to be functioning below baseline level  Pt demonstrated the following tasks: Supervision supine to sit, supervision sit to stand, supervision for functional ambulation using a rollator  Pt performed the following ADLs: supervision to complete toileting, supervision standing at sink to wash face and rinse her mouth, pt required mod A to complete LB dressing including threading b/l LE, Lisandro to complete UE bathing this date  Occupational performance remains limited secondary to factors listed above and increased risk for falls and injury  From OT standpoint, recommendation at time of d/c would be home with home health rehabilitation and 24/7 supervision   Patient to benefit from continued Occupational Therapy treatment while in the hospital to address deficits as defined above and maximize level of functional independence with ADLs and functional mobility  Upon completion of OT session pt was left in bed with bed alarm on and all current needs met and call bell within reach  The patient's raw score on the AM-PAC Daily Activity inpatient short form is 19, standardized score is 40 22, greater than 39 4  Patients at this level are likely to benefit from discharge to home with home health rehabilitation and 24/7 supervision  Please refer to the recommendation of the Occupational Therapist for safe discharge planning       OT Discharge Recommendation: Home with home health rehabilitation (+24/7 supervision )  OT - OK to Discharge: Yes (When medically appropriate )

## 2021-09-07 NOTE — OCCUPATIONAL THERAPY NOTE
Occupational Therapy Progress Note     Patient Name: Analia Adams  NJSKB'H Date: 9/7/2021  Problem List  Principal Problem:    Ambulatory dysfunction  Active Problems:    Chronic pain syndrome    Esophageal reflux    Generalized anxiety disorder    Tardive dyskinesia    History of CVA (cerebrovascular accident)    Mixed hyperlipidemia            09/07/21 0830   OT Last Visit   OT Visit Date 09/07/21   Note Type   Note Type Treatment   Restrictions/Precautions   Weight Bearing Precautions Per Order No   Other Precautions Bed Alarm; Fall Risk  (Per pt she has "severe anxiety")   Lifestyle   Autonomy pta pt reports son assists w/ ADLs/IADLs  pt reports using rollator for functional mobility PTA   Reciprocal Relationships supportive son- reports son is going back to work shortly and she will be home alone   Service to Others not currentlying working   Intrinsic Gratification enjoys watching tv   Pain Assessment   Pain Assessment Tool FLACC   Pain Rating: FLACC (Rest) - Face 0   Pain Rating: FLACC (Rest) - Legs 0   Pain Rating: FLACC (Rest) - Activity 0   Pain Rating: FLACC (Rest) - Cry 0   Pain Rating: FLACC (Rest) - Consolability 0   Score: FLACC (Rest) 0   Pain Rating: FLACC (Activity) - Face 0   Pain Rating: FLACC (Activity) - Legs 0   Pain Rating: FLACC (Activity) - Activity 0   Pain Rating: FLACC (Activity) - Cry 0   Pain Rating: FLACC (Activity) - Consolability 0   Score: FLACC (Activity) 0   ADL   Where Assessed Standing at sink  (And seated EOB)   Grooming Assistance 5  Supervision/Setup   Grooming Deficit Supervision/safety; Increased time to complete  (Oral care)   Grooming Comments Pt standing at sink during oral care; required increased time to complete and verbal cueing to utilize energy conservation techniques as she reported she often gets tired while completing ADLs at home    [employer] [address]; Increased time to complete  (Pt required assistance to bath back )   UB Bathing Comments Pt performed seated EOB this date; she required assistance to bath her back    LB Dressing Assistance 3  Moderate Assistance   LB Dressing Deficit Increased time to complete; Thread RLE into pants; Thread LLE into pants   LB Dressing Comments Pt required assistance to thread both legs into pants; she was able to pull pants over hips with CGA; pt completed sock mgmt with supervision    Toileting Assistance  5  Supervision/Setup   Toileting Comments Pt performed clothing mgmt with supervision    Bed Mobility   Supine to Sit 5  Supervision   Additional items Assist x 1; Increased time required; Bedrails   Sit to Supine   (CGA)   Additional items LE management   Transfers   Sit to Stand 5  Supervision   Additional items Assist x 1; Armrests; Increased time required   Stand to Sit 5  Supervision   Additional items Assist x 1; Increased time required;Armrests   Toilet transfer 5  Supervision   Additional items Assist x 1; Increased time required  (Grab bars; Rollator )   Functional Mobility   Functional Mobility 5  Supervision   Additional Comments SHHD   Additional items   (Rollator )   Cognition   Overall Cognitive Status Impaired   Arousal/Participation Alert; Responsive; Cooperative   Attention Attends with cues to redirect   Orientation Level Oriented to person;Oriented to place;Oriented to situation   Memory Decreased short term memory   Following Commands Follows one step commands with increased time or repetition   Comments Pt pleasant, cooperative, and willing to participate in OT session this date; pt stated she was having "increased anxiety" this date    Activity Tolerance   Activity Tolerance Patient limited by fatigue   Medical Staff Made Aware RN cleared pt for OT session    Assessment   Assessment Upon arrival of OT, pt was supine in bed with bed alarm on   Pt participated in skilled OT session this date with interventions consisting of ADL re training with the use of correct body mechnaics, Energy Conservation techniques, safety awareness and fall prevention techniques and  functional ambulation of SHHD*   In comparison to previous session, pt demonstrates improvements in activity tolerance  Pt continues to be functioning below baseline level  Pt demonstrated the following tasks: Supervision supine to sit, supervision sit to stand, supervision for functional ambulation using a rollator  Pt performed the following ADLs: supervision to complete toileting, supervision standing at sink to wash face and rinse her mouth, pt required mod A to complete LB dressing including threading b/l LE, Lisandro to complete UE bathing this date  Occupational performance remains limited secondary to factors listed above and increased risk for falls and injury  From OT standpoint, recommendation at time of d/c would be home with home health rehabilitation and 24/7 supervision  Patient to benefit from continued Occupational Therapy treatment while in the hospital to address deficits as defined above and maximize level of functional independence with ADLs and functional mobility  Upon completion of OT session pt was left in bed with bed alarm on and all current needs met and call bell within reach  The patient's raw score on the AM-PAC Daily Activity inpatient short form is 19, standardized score is 40 22, greater than 39 4  Patients at this level are likely to benefit from discharge to home with home health rehabilitation and 24/7 supervision  Please refer to the recommendation of the Occupational Therapist for safe discharge planning  Plan   Treatment Interventions ADL retraining;Functional transfer training; Endurance training;Patient/family training;Energy conservation   Goal Expiration Date 09/13/21   OT Treatment Day 4   OT Frequency 2-3x/wk   Recommendation   OT Discharge Recommendation Home with home health rehabilitation  (+24/7 supervision )   OT - OK to Discharge Yes  (When medically appropriate )   AM-PAC Daily Activity Inpatient   Lower Body Dressing 3   Bathing 3   Toileting 3   Upper Body Dressing 3   Grooming 3   Eating 4   Daily Activity Raw Score 19   Daily Activity Standardized Score (Calc for Raw Score >=11) 40 22   AM-PAC Applied Cognition Inpatient   Following a Speech/Presentation 3   Understanding Ordinary Conversation 4   Taking Medications 3   Remembering Where Things Are Placed or Put Away 3   Remembering List of 4-5 Errands 2   Taking Care of Complicated Tasks 2   Applied Cognition Raw Score 17   Applied Cognition Standardized Score 36 52     Mel Marie, OTR/L

## 2021-09-07 NOTE — CASE MANAGEMENT
[facility] to identify when they will see pt    vm to son requesting CB    [facility], cannot [facility]  [facility]  [facility]ed clinical x2 failed    VM to following facilities for determination:    [facility]-no bed  [facility] accepting patients  [facility] answer range >15 times  [facility] [place], [facility], they have beds  Advised to fax clinical to [phone] and resend referral in [employer], same completed    Spoke with pt, aware no accepting facility, 2 facilities reviewing  If not accepted will need to expand search 50-75 miles    She is agreeable to same

## 2021-09-08 ENCOUNTER — PATIENT OUTREACH (OUTPATIENT)
Dept: INTERNAL MEDICINE CLINIC | Facility: CLINIC | Age: 56
End: 2021-09-08

## 2021-09-08 PROCEDURE — 99231 SBSQ HOSP IP/OBS SF/LOW 25: CPT | Performed by: INTERNAL MEDICINE

## 2021-09-08 RX ADMIN — ACETAMINOPHEN 650 MG: 325 TABLET, FILM COATED ORAL at 21:15

## 2021-09-08 RX ADMIN — OXYCODONE HYDROCHLORIDE AND ACETAMINOPHEN 1 TABLET: 5; 325 TABLET ORAL at 06:16

## 2021-09-08 RX ADMIN — LITHIUM CARBONATE 300 MG: 300 TABLET, FILM COATED, EXTENDED RELEASE ORAL at 21:16

## 2021-09-08 RX ADMIN — PREGABALIN 100 MG: 100 CAPSULE ORAL at 21:14

## 2021-09-08 RX ADMIN — METHOCARBAMOL 500 MG: 500 TABLET, FILM COATED ORAL at 09:06

## 2021-09-08 RX ADMIN — ACETAMINOPHEN 650 MG: 325 TABLET, FILM COATED ORAL at 06:16

## 2021-09-08 RX ADMIN — QUETIAPINE FUMARATE 50 MG: 25 TABLET ORAL at 21:14

## 2021-09-08 RX ADMIN — PROPRANOLOL HYDROCHLORIDE 20 MG: 20 TABLET ORAL at 13:24

## 2021-09-08 RX ADMIN — ACETAMINOPHEN 650 MG: 325 TABLET, FILM COATED ORAL at 13:23

## 2021-09-08 RX ADMIN — PREGABALIN 100 MG: 100 CAPSULE ORAL at 09:06

## 2021-09-08 RX ADMIN — LORAZEPAM 0.5 MG: 0.5 TABLET ORAL at 09:06

## 2021-09-08 RX ADMIN — QUETIAPINE FUMARATE 50 MG: 25 TABLET ORAL at 09:06

## 2021-09-08 RX ADMIN — FERROUS SULFATE TAB 325 MG (65 MG ELEMENTAL FE) 325 MG: 325 (65 FE) TAB at 09:06

## 2021-09-08 RX ADMIN — BUSPIRONE HYDROCHLORIDE 15 MG: 10 TABLET ORAL at 16:57

## 2021-09-08 RX ADMIN — PRAZOSIN HYDROCHLORIDE 2 MG: 2 CAPSULE ORAL at 09:04

## 2021-09-08 RX ADMIN — OXYCODONE HYDROCHLORIDE AND ACETAMINOPHEN 500 MG: 500 TABLET ORAL at 16:57

## 2021-09-08 RX ADMIN — BUSPIRONE HYDROCHLORIDE 15 MG: 10 TABLET ORAL at 21:14

## 2021-09-08 RX ADMIN — PANTOPRAZOLE SODIUM 20 MG: 20 TABLET, DELAYED RELEASE ORAL at 06:16

## 2021-09-08 RX ADMIN — PAROXETINE HYDROCHLORIDE 60 MG: 20 TABLET, FILM COATED ORAL at 21:16

## 2021-09-08 RX ADMIN — QUETIAPINE FUMARATE 50 MG: 25 TABLET ORAL at 16:56

## 2021-09-08 RX ADMIN — DICLOFENAC SODIUM 2 G: 10 GEL TOPICAL at 13:24

## 2021-09-08 RX ADMIN — ASPIRIN 81 MG CHEWABLE TABLET 81 MG: 81 TABLET CHEWABLE at 09:06

## 2021-09-08 RX ADMIN — MIRTAZAPINE 7.5 MG: 15 TABLET, FILM COATED ORAL at 21:15

## 2021-09-08 RX ADMIN — OXYCODONE HYDROCHLORIDE AND ACETAMINOPHEN 500 MG: 500 TABLET ORAL at 09:06

## 2021-09-08 RX ADMIN — OXYCODONE HYDROCHLORIDE AND ACETAMINOPHEN 1 TABLET: 5; 325 TABLET ORAL at 13:27

## 2021-09-08 RX ADMIN — FOLIC ACID 1000 MCG: 1 TABLET ORAL at 09:06

## 2021-09-08 RX ADMIN — AMLODIPINE BESYLATE 5 MG: 5 TABLET ORAL at 09:05

## 2021-09-08 RX ADMIN — BUSPIRONE HYDROCHLORIDE 15 MG: 10 TABLET ORAL at 09:05

## 2021-09-08 RX ADMIN — METHOCARBAMOL 500 MG: 500 TABLET, FILM COATED ORAL at 16:57

## 2021-09-08 RX ADMIN — QUETIAPINE FUMARATE 50 MG: 25 TABLET ORAL at 13:24

## 2021-09-08 RX ADMIN — PREGABALIN 100 MG: 100 CAPSULE ORAL at 16:56

## 2021-09-08 RX ADMIN — DICLOFENAC SODIUM 2 G: 10 GEL TOPICAL at 09:10

## 2021-09-08 RX ADMIN — VALBENAZINE 80 MG: 80 CAPSULE ORAL at 09:03

## 2021-09-08 RX ADMIN — ENOXAPARIN SODIUM 40 MG: 40 INJECTION SUBCUTANEOUS at 09:09

## 2021-09-08 RX ADMIN — LORAZEPAM 0.5 MG: 0.5 TABLET ORAL at 17:00

## 2021-09-08 RX ADMIN — OXYCODONE HYDROCHLORIDE AND ACETAMINOPHEN 1 TABLET: 5; 325 TABLET ORAL at 21:14

## 2021-09-08 RX ADMIN — ATORVASTATIN CALCIUM 40 MG: 40 TABLET, FILM COATED ORAL at 16:57

## 2021-09-08 NOTE — PROGRESS NOTES
INTERNAL MEDICINE RESIDENCY PROGRESS NOTE     Name: Fredy Sabillon   Age & Sex: 60 y o  female   MRN: [id number]  Unit/Bed#: [facility] 908-01   Encounter: [id number]  Team: SOD Team A    PATIENT INFORMATION     Name: Fredy Sabillon   Age & Sex: 60 y o  female   MRN: [id number]  Hospital Stay Days: 23    ASSESSMENT/PLAN     Principal Problem:    Ambulatory dysfunction  Active Problems:    Chronic pain syndrome    Esophageal reflux    Generalized anxiety disorder    Tardive dyskinesia    History of CVA (cerebrovascular accident)    Mixed hyperlipidemia      Mixed hyperlipidemia  Assessment & Plan  -On Atorvastatin 40 mg PO qd    History of CVA (cerebrovascular accident)  Assessment & Plan  -On Aspirin 81 mg PO qd  -On Atorvastatin 40 mg PO qd    Tardive dyskinesia  Assessment & Plan  -Continue Valbenazine 80 mg daily  -appears to have slightly improved with addition of Lyrica 100 mg t i d     Generalized anxiety disorder  Assessment & Plan  Patient is on multiple psychiatric medications and was recently evaluated by inpatient psych at [facility] with adjustments to her medications including buspirone 50 mg t i d , paroxetine 60 mg daily, quetiapine 50 mg 4 times daily, mirtazapine 7 5 mg q h s , lithium 300 mg q h s  And hydroxyzine 50 mg t i d  P r n  [place] Patient continues to have increased anxiety with daily morning panic attacks  Patient also reports that her anxiety is worsened by her lack of pain control  Plan:  · Continue medical management  · Continue current psychotropic medication   · She is psychiatrically cleared to go to SNF  · Patient could benefit from outpatient psychiatric evaluation and follow-up  · Confirmed psych medication regimen following discharge from [address] [place] [address] as noted above  Esophageal reflux  Assessment & Plan  Continue Protonix 20 mg daily  Chronic pain syndrome  Assessment & Plan  Patient has been on chronic opioids for low back and leg pain    She was seen in the clinic and discussed opioid tapering  Most recent opioid prescription per PDMP was for morphine 15 mg for 10 days (20 tablets)  Per chart review, concern for polypharmacy and suspicion of drug-seeking  · On Tylenol 650 mg q8h  · Lidocaine patch/Voltaren gel  · On Lyrica 100 mg  t i d   · Oxy IR 2 5 mg q6h prn  · Bengay topical ointment 4 times daily PRN  · 8/21 Added Aqua K    · 8/27 Added Robaxin  500 mg q 6h p r n     * Ambulatory dysfunction  Assessment & Plan  Patient with decreased physical mobility, at risk for falls  She uses walker and wheelchair at home  Presented to the ED requesting placement to SNF  · Encourage good body mechanics and assist with transfers  · Keep personal items and call bell close to the patient  · PT and OT- recommends post acute rehab  · Pending placement  Cough-resolved as of 8/30/2021  Assessment & Plan  Patient complains of progressive worsening cough from 08/25 to 8/26  Cough is nonproductive  Patient also reports associated chills, nasal congestion, and nausea  -COVID PCR negative  -chest x-ray unremarkable  -Robitussin for cough  -continue to trend white count and fevers    -resolved as of 8/29        Disposition: Patient is medically stable, on psychotropic medications along with appropriate pain control for her back pain  Case management has been actively working to obtain placement for this patient  As per case management, they have expanded search and sent in more referrals  SUBJECTIVE     Patient seen and examined  She reports improvement in her back pain  Has mild anxiety but states that it is being well managed with her medications  No acute events overnight      OBJECTIVE     Vitals:    09/07/21 1555 09/07/21 2323 09/08/21 0611 09/08/21 0751   BP: 92/71 96/59 97/59 111/66   Pulse: 56 56     Resp: 16 20     Temp: (!) 97 4 °F (36 3 °C) 98 1 °F (36 7 °C)  98 °F (36 7 °C)   TempSrc:       SpO2: 98% 96%     Weight:       Height: Temperature:   Temp (24hrs), Av 8 °F (36 6 °C), Min:97 4 °F (36 3 °C), Max:98 1 °F (36 7 °C)    Temperature: 98 °F (36 7 °C)  Intake & Output:  I/O        07 -  0700  07 -  07 07 -  0700    P  O  240 480 240    Total Intake(mL/kg) 240 (2 6) 480 (5 3) 240 (2 6)    Urine (mL/kg/hr) 1000 (0 5)      Total Output 1000      Net -760 +480 +240           Unmeasured Urine Occurrence 8 x 5 x     Unmeasured Stool Occurrence 1 x          Weights:   IBW (Ideal Body Weight): 47 8 kg    Body mass index is 37 99 kg/m²  Weight (last 2 days)     None        Physical Exam  LABORATORY DATA     Labs: I have personally reviewed pertinent reports  Results from last 7 days   Lab Units 21  0743   WBC Thousand/uL 3 99*   HEMOGLOBIN g/dL 12 6   HEMATOCRIT % 38 8   PLATELETS Thousands/uL 214   NEUTROS PCT % 62   MONOS PCT % 8      Results from last 7 days   Lab Units 21  0743   POTASSIUM mmol/L 3 7   CHLORIDE mmol/L 109*   CO2 mmol/L 31   BUN mg/dL 6   CREATININE mg/dL 0 63   CALCIUM mg/dL 8 2*                            IMAGING & DIAGNOSTIC TESTING     Radiology Results: I have personally reviewed pertinent reports  No results found  Other Diagnostic Testing: I have personally reviewed pertinent reports      ACTIVE MEDICATIONS     Current Facility-Administered Medications   Medication Dose Route Frequency    acetaminophen (TYLENOL) tablet 650 mg  650 mg Oral Q8H [address]    amLODIPine (NORVASC) tablet 5 mg  5 mg Oral Daily    ascorbic acid (VITAMIN C) tablet 500 mg  500 mg Oral BID    aspirin chewable tablet 81 mg  81 mg Oral Daily    atorvastatin (LIPITOR) tablet 40 mg  40 mg Oral Daily With Dinner    busPIRone (BUSPAR) tablet 15 mg  15 mg Oral TID    dextromethorphan-guaiFENesin (ROBITUSSIN DM) oral syrup 10 mL  10 mL Oral Q4H PRN    Diclofenac Sodium (VOLTAREN) 1 % topical gel 2 g  2 g Topical 4x Daily    enoxaparin (LOVENOX) subcutaneous injection 40 mg  40 mg Subcutaneous Daily    ferrous sulfate tablet 325 mg  325 mg Oral Daily With Breakfast    folic acid (FOLVITE) tablet 1,000 mcg  1,000 mcg Oral Daily    hydrOXYzine HCL (ATARAX) tablet 50 mg  50 mg Oral TID PRN    lidocaine (LIDODERM) 5 % patch 1 patch  1 patch Topical Daily    lidocaine (LIDODERM) 5 % patch 1 patch  1 patch Topical Daily    lithium carbonate (LITHOBID) CR tablet 300 mg  300 mg Oral HS    LORazepam (ATIVAN) tablet 0 5 mg  0 5 mg Oral Q8H PRN    menthol-methyl salicylate (BENGAY) 17-63 % cream   Apply externally 4x Daily PRN    methocarbamol (ROBAXIN) tablet 500 mg  500 mg Oral Q6H PRN    mirtazapine (REMERON) tablet 7 5 mg  7 5 mg Oral HS PRN    ondansetron (ZOFRAN-ODT) dispersible tablet 4 mg  4 mg Oral Q6H PRN    oxyCODONE-acetaminophen (PERCOCET) 5-325 mg per tablet 1 tablet  1 tablet Oral Q6H PRN    pantoprazole (PROTONIX) EC tablet 20 mg  20 mg Oral Early Morning    PARoxetine (PAXIL) tablet 60 mg  60 mg Oral Daily    polyethylene glycol (MIRALAX) packet 17 g  17 g Oral Daily    prazosin (MINIPRESS) capsule 2 mg  2 mg Oral Daily    pregabalin (LYRICA) capsule 100 mg  100 mg Oral TID    propranolol (INDERAL) tablet 20 mg  20 mg Oral BID before breakfast/lunch    QUEtiapine (SEROquel) tablet 50 mg  50 mg Oral 4x Daily (PC & HS)    senna-docusate sodium (SENOKOT S) 8 6-50 mg per tablet 1 tablet  1 tablet Oral Daily PRN    Valbenazine Tosylate CAPS 80 mg  80 mg Oral Daily       VTE Pharmacologic Prophylaxis: Enoxaparin (Lovenox)  VTE Mechanical Prophylaxis: sequential compression device    Portions of the record may have been created with voice recognition software  Occasional wrong word or "sound a like" substitutions may have occurred due to the inherent limitations of voice recognition software    Read the chart carefully and recognize, using context, where substitutions have occurred   ==  Antelmo Barrera MD  [address]  Internal Medicine Residency PGY-1

## 2021-09-08 NOTE — CASE MANAGEMENT
[facility], unable to accept insurance    50 additional referrals to STR sent    Met with pt, she is aware that we have no accepting facility and have  expanded search  States her son wants her to go to STR prior to returning home  States she cannot go to dghters as there are to many steps  Explained therapy has cleared for home with home care, but would need 24/7 caregiver which she does not have    PT requesting I call [facility] and provide referral for them to find her a place to live  Advised at this time we are looking for STR at SNF    Message to [facility] to identify when they will do site visit  [facility] reviewing    PT notified of same

## 2021-09-08 NOTE — PROGRESS NOTES
SW received a call from pt who is currently in the hospital awaiting SNF placement  Pt is requesting help with a referral to [facility]  to their Housing Program   Sw explained that this would need to come from her [facility] Provider [facility] [address] [facility] Provider)   As per pt request Sw did provide her CM name and  #  Gerard Henson [phone])  SW did explain that the hospital will help with her SNF placement and when ready for a return to home perhaps the SNF could assist with this referral   They would aslo need to have the Waiver Program set up prior to her discharge  Sw did explain that even with a referral we do not know if/when they could accept pt or when their housing program would be appropriate or available   Moisés did review with pt she has been unsafe at home because she does NOT have 24 hour care  She is not able to care for her self and her anxiety attacks have also caused multiple ED/ hospital  admissions  SW has also reached out to [address] [place] [address] [address] RN to inform her of pt's request    MOISÉS did reinforce that pt would NOT have 24 our supervision if discharged to home  Pooja Arteaga to f/u with pt

## 2021-09-09 PROCEDURE — 99232 SBSQ HOSP IP/OBS MODERATE 35: CPT | Performed by: INTERNAL MEDICINE

## 2021-09-09 PROCEDURE — 97116 GAIT TRAINING THERAPY: CPT

## 2021-09-09 PROCEDURE — 97530 THERAPEUTIC ACTIVITIES: CPT

## 2021-09-09 RX ADMIN — BUSPIRONE HYDROCHLORIDE 15 MG: 10 TABLET ORAL at 21:02

## 2021-09-09 RX ADMIN — PREGABALIN 100 MG: 100 CAPSULE ORAL at 09:20

## 2021-09-09 RX ADMIN — OXYCODONE HYDROCHLORIDE AND ACETAMINOPHEN 500 MG: 500 TABLET ORAL at 09:19

## 2021-09-09 RX ADMIN — QUETIAPINE FUMARATE 50 MG: 25 TABLET ORAL at 21:02

## 2021-09-09 RX ADMIN — BUSPIRONE HYDROCHLORIDE 15 MG: 10 TABLET ORAL at 17:24

## 2021-09-09 RX ADMIN — ACETAMINOPHEN 650 MG: 325 TABLET, FILM COATED ORAL at 17:24

## 2021-09-09 RX ADMIN — AMLODIPINE BESYLATE 5 MG: 5 TABLET ORAL at 09:19

## 2021-09-09 RX ADMIN — DICLOFENAC SODIUM 2 G: 10 GEL TOPICAL at 09:24

## 2021-09-09 RX ADMIN — MIRTAZAPINE 7.5 MG: 15 TABLET, FILM COATED ORAL at 21:03

## 2021-09-09 RX ADMIN — QUETIAPINE FUMARATE 50 MG: 25 TABLET ORAL at 09:19

## 2021-09-09 RX ADMIN — QUETIAPINE FUMARATE 50 MG: 25 TABLET ORAL at 11:48

## 2021-09-09 RX ADMIN — ACETAMINOPHEN 650 MG: 325 TABLET, FILM COATED ORAL at 06:03

## 2021-09-09 RX ADMIN — PREGABALIN 100 MG: 100 CAPSULE ORAL at 17:24

## 2021-09-09 RX ADMIN — VALBENAZINE 80 MG: 80 CAPSULE ORAL at 09:22

## 2021-09-09 RX ADMIN — FOLIC ACID 1000 MCG: 1 TABLET ORAL at 09:19

## 2021-09-09 RX ADMIN — ATORVASTATIN CALCIUM 40 MG: 40 TABLET, FILM COATED ORAL at 17:24

## 2021-09-09 RX ADMIN — ACETAMINOPHEN 650 MG: 325 TABLET, FILM COATED ORAL at 21:01

## 2021-09-09 RX ADMIN — LORAZEPAM 0.5 MG: 0.5 TABLET ORAL at 09:19

## 2021-09-09 RX ADMIN — ASPIRIN 81 MG CHEWABLE TABLET 81 MG: 81 TABLET CHEWABLE at 09:19

## 2021-09-09 RX ADMIN — LITHIUM CARBONATE 300 MG: 300 TABLET, FILM COATED, EXTENDED RELEASE ORAL at 21:06

## 2021-09-09 RX ADMIN — PRAZOSIN HYDROCHLORIDE 2 MG: 2 CAPSULE ORAL at 09:23

## 2021-09-09 RX ADMIN — BUSPIRONE HYDROCHLORIDE 15 MG: 10 TABLET ORAL at 09:19

## 2021-09-09 RX ADMIN — PAROXETINE HYDROCHLORIDE 60 MG: 20 TABLET, FILM COATED ORAL at 21:05

## 2021-09-09 RX ADMIN — OXYCODONE HYDROCHLORIDE AND ACETAMINOPHEN 1 TABLET: 5; 325 TABLET ORAL at 06:03

## 2021-09-09 RX ADMIN — QUETIAPINE FUMARATE 50 MG: 25 TABLET ORAL at 17:24

## 2021-09-09 RX ADMIN — HYDROXYZINE HYDROCHLORIDE 50 MG: 25 TABLET, FILM COATED ORAL at 11:18

## 2021-09-09 RX ADMIN — OXYCODONE HYDROCHLORIDE AND ACETAMINOPHEN 1 TABLET: 5; 325 TABLET ORAL at 17:24

## 2021-09-09 RX ADMIN — PROPRANOLOL HYDROCHLORIDE 20 MG: 20 TABLET ORAL at 11:17

## 2021-09-09 RX ADMIN — PREGABALIN 100 MG: 100 CAPSULE ORAL at 21:03

## 2021-09-09 RX ADMIN — FERROUS SULFATE TAB 325 MG (65 MG ELEMENTAL FE) 325 MG: 325 (65 FE) TAB at 09:19

## 2021-09-09 RX ADMIN — LORAZEPAM 0.5 MG: 0.5 TABLET ORAL at 19:45

## 2021-09-09 RX ADMIN — PANTOPRAZOLE SODIUM 20 MG: 20 TABLET, DELAYED RELEASE ORAL at 06:03

## 2021-09-09 RX ADMIN — ENOXAPARIN SODIUM 40 MG: 40 INJECTION SUBCUTANEOUS at 09:22

## 2021-09-09 RX ADMIN — OXYCODONE HYDROCHLORIDE AND ACETAMINOPHEN 500 MG: 500 TABLET ORAL at 17:24

## 2021-09-09 NOTE — CASE MANAGEMENT
S/w SOD A for care coordination  TC to Elio Limon at [facility] [phone] & they will be coming to do a site visit either tomorrow or Monday  Ecin messages sent to [address]  TC to son ROBIN CHEW & discussed dc plan  Per ROBIN OLESYAAMANDA, pt lives with him in apt & pt cannot return  States pt cannot get thought narrow hallways with w/c or rw  States he went back to work & pt does not have 24/7 support  Pt's other child, Tashia Dumas cannot have pt stay with her  States they are looking to move in the future & pt might be able to live with them with with waiver services  For now, they are looking into placement for pt & agreeable to send referrals to MA facilities for long term placement  CM s/w pt with [facility] resident, Godwin Munson to discuss plans

## 2021-09-09 NOTE — PLAN OF CARE
Problem: PHYSICAL THERAPY ADULT  Goal: Performs mobility at highest level of function for planned discharge setting  See evaluation for individualized goals  Description: Treatment/Interventions: Functional transfer training, LE strengthening/ROM, Elevations, Therapeutic exercise, Endurance training, Patient/family training, Equipment eval/education, Bed mobility, Gait training, Spoke to nursing, OT  Equipment Recommended: Linda       See flowsheet documentation for full assessment, interventions and recommendations  Note: Prognosis: Fair  Problem List: Decreased endurance, Decreased mobility, Pain, Impaired balance  Assessment: Pt seen for PT treatment session this date  Therapy session focused on bed mobility, functional transfers, gait training, stair training, and endurance training in order to improve overall mobility and independence  Pt requires assist of 1 for all mobility performed this date  Pt continues to be limited by increased fatigue requiring frequent rest breaks before, during and after all mobility tasks  Pt performed bed mobility tasks and functional transfers at S level  Pt ambulated short distances with RW at S level; multiple seated rest breaks required throughout gait training 2* increased fatigue and pain  Cues for upright posture and safety required  Pt negotiated 3 steps with L HR at S level using step to nonreciprocal pattern  Pt making progress toward goals  Pt was left sitting upright in bedside chair with chair alarm on at the end of PT session with all needs in reach  Pt would benefit from continued PT services while in hospital to address remaining limitations  PT to continue to follow pt and recommends home with HHPT and increased social support vs LTC; please also refer to OT cog assessment evaluation to see additional d/c recommendations  The patient's AM-PAC Basic Mobility Inpatient Short Form Raw Score is 19, Standardized Score is 42 48   A standardized score less than 42 9 suggests the patient may benefit from discharge to post-acute rehabilitation services  Please also refer to the recommendation of the Physical Therapist for safe discharge planning  Barriers to Discharge: Decreased caregiver support       PT Discharge Recommendation: Home with home health rehabilitation (+ increased social support vs LTC; also see OT cog recs  )     PT - OK to Discharge: Yes (once medically cleared )    See flowsheet documentation for full assessment

## 2021-09-09 NOTE — PROGRESS NOTES
INTERNAL MEDICINE RESIDENCY PROGRESS NOTE     Name: Elyssa Frost   Age & Sex: 60 y o  female   MRN: [id number]  Unit/Bed#: [facility] 908-01   Encounter: [id number]  Team: SOD Team A    PATIENT INFORMATION     Name: Elyssa Frost   Age & Sex: 60 y o  female   MRN: [id number]  Hospital Stay Days: 20    ASSESSMENT/PLAN     Principal Problem:    Ambulatory dysfunction  Active Problems:    Chronic pain syndrome    Esophageal reflux    Generalized anxiety disorder    Tardive dyskinesia    History of CVA (cerebrovascular accident)    Mixed hyperlipidemia      Mixed hyperlipidemia  Assessment & Plan  -On Atorvastatin 40 mg PO qd    History of CVA (cerebrovascular accident)  Assessment & Plan  -On Aspirin 81 mg PO qd  -On Atorvastatin 40 mg PO qd    Tardive dyskinesia  Assessment & Plan  -Continue Valbenazine 80 mg daily  -appears to have slightly improved with addition of Lyrica 100 mg t i d     Generalized anxiety disorder  Assessment & Plan  Patient is on multiple psychiatric medications and was recently evaluated by inpatient psych at [facility] with adjustments to her medications including buspirone 50 mg t i d , paroxetine 60 mg daily, quetiapine 50 mg 4 times daily, mirtazapine 7 5 mg q h s , lithium 300 mg q h s  And hydroxyzine 50 mg t i d  P r n  Georgette Ormond Patient continues to have increased anxiety with daily morning panic attacks  Patient also reports that her anxiety is worsened by her lack of pain control  Plan:  · Continue medical management  · Continue current psychotropic medication   · She is psychiatrically cleared to go to SNF  · Patient could benefit from outpatient psychiatric evaluation and follow-up  · Confirmed psych medication regimen following discharge from [address] [place] [address] as noted above  Esophageal reflux  Assessment & Plan  Continue Protonix 20 mg daily  Chronic pain syndrome  Assessment & Plan  Patient has been on chronic opioids for low back and leg pain    She was seen in the clinic and discussed opioid tapering  Most recent opioid prescription per PDMP was for morphine 15 mg for 10 days (20 tablets)  Per chart review, concern for polypharmacy and suspicion of drug-seeking  · On Tylenol 650 mg q8h  · Lidocaine patch/Voltaren gel  · On Lyrica 100 mg  t i d   · Oxy IR 2 5 mg q6h prn  · Bengay topical ointment 4 times daily PRN  · 8/21 Added Aqua K    · 8/27 Added Robaxin  500 mg q 6h p r n     * Ambulatory dysfunction  Assessment & Plan  Patient with decreased physical mobility, at risk for falls  She uses walker and wheelchair at home  Presented to the ED requesting placement to SNF  · Encourage good body mechanics and assist with transfers  · Keep personal items and call bell close to the patient  · PT and OT- recommends post acute rehab  · Pending placement  Cough-resolved as of 8/30/2021  Assessment & Plan  Patient complains of progressive worsening cough from 08/25 to 8/26  Cough is nonproductive  Patient also reports associated chills, nasal congestion, and nausea  -COVID PCR negative  -chest x-ray unremarkable  -Robitussin for cough  -continue to trend white count and fevers    -resolved as of 8/29        Disposition: Patient is medically stable, on psychotropic medications along with appropriate pain control for her back pain  Case management has been actively working to obtain a suitable rehab for this patient  CM spoke with patients son today, and as per the son, patient will not be able to return to her sons home after rehab because of narrow hallways  Therefore, [facility] is now looking at placement for this patient and will send out referral for the same today  SUBJECTIVE     Patient seen and examined  She reports she has mild back pain and also reports of some anxiety  No acute events overnight       OBJECTIVE     Vitals:    09/08/21 0751 09/08/21 1323 09/08/21 1527 09/08/21 2207   BP: 111/66 121/80 125/69 103/59   Pulse:       Resp:   18 18   Temp: 98 °F (36 7 °C)  98 2 °F (36 8 °C) 98 °F (36 7 °C)   TempSrc:       SpO2:       Weight:       Height:          Temperature:   Temp (24hrs), Av 1 °F (36 7 °C), Min:98 °F (36 7 °C), Max:98 2 °F (36 8 °C)    Temperature: 98 °F (36 7 °C)  Intake & Output:  I/O        07 -  0700 701 -  07    P  O  480 480    Total Intake(mL/kg) 480 (5 3) 480 (5 3)    Net +480 +480          Unmeasured Urine Occurrence 5 x         Weights:   IBW (Ideal Body Weight): 47 8 kg    Body mass index is 37 99 kg/m²  Weight (last 2 days)     None        Physical Exam  HENT:      Head: Normocephalic and atraumatic  Eyes:      Conjunctiva/sclera: Conjunctivae normal    Cardiovascular:      Rate and Rhythm: Normal rate and regular rhythm  Pulses: Normal pulses  Heart sounds: Normal heart sounds  Pulmonary:      Effort: Pulmonary effort is normal       Breath sounds: Normal breath sounds  Abdominal:      General: Bowel sounds are normal       Palpations: Abdomen is soft  Skin:     General: Skin is warm and dry  Capillary Refill: Capillary refill takes less than 2 seconds  Neurological:      Mental Status: She is alert and oriented to person, place, and time  Mental status is at baseline  LABORATORY DATA     Labs: I have personally reviewed pertinent reports  Results from last 7 days   Lab Units 21  0743   WBC Thousand/uL 3 99*   HEMOGLOBIN g/dL 12 6   HEMATOCRIT % 38 8   PLATELETS Thousands/uL 214   NEUTROS PCT % 62   MONOS PCT % 8      Results from last 7 days   Lab Units 21  0743   POTASSIUM mmol/L 3 7   CHLORIDE mmol/L 109*   CO2 mmol/L 31   BUN mg/dL 6   CREATININE mg/dL 0 63   CALCIUM mg/dL 8 2*                            IMAGING & DIAGNOSTIC TESTING     Radiology Results: I have personally reviewed pertinent reports  No results found  Other Diagnostic Testing: I have personally reviewed pertinent reports      ACTIVE MEDICATIONS     Current Facility-Administered Medications Medication Dose Route Frequency    acetaminophen (TYLENOL) tablet 650 mg  650 mg Oral Q8H [address]    amLODIPine (NORVASC) tablet 5 mg  5 mg Oral Daily    ascorbic acid (VITAMIN C) tablet 500 mg  500 mg Oral BID    aspirin chewable tablet 81 mg  81 mg Oral Daily    atorvastatin (LIPITOR) tablet 40 mg  40 mg Oral Daily With Dinner    busPIRone (BUSPAR) tablet 15 mg  15 mg Oral TID    dextromethorphan-guaiFENesin (ROBITUSSIN DM) oral syrup 10 mL  10 mL Oral Q4H PRN    Diclofenac Sodium (VOLTAREN) 1 % topical gel 2 g  2 g Topical 4x Daily    enoxaparin (LOVENOX) subcutaneous injection 40 mg  40 mg Subcutaneous Daily    ferrous sulfate tablet 325 mg  325 mg Oral Daily With Breakfast    folic acid (FOLVITE) tablet 1,000 mcg  1,000 mcg Oral Daily    hydrOXYzine HCL (ATARAX) tablet 50 mg  50 mg Oral TID PRN    lidocaine (LIDODERM) 5 % patch 1 patch  1 patch Topical Daily    lidocaine (LIDODERM) 5 % patch 1 patch  1 patch Topical Daily    lithium carbonate (LITHOBID) CR tablet 300 mg  300 mg Oral HS    LORazepam (ATIVAN) tablet 0 5 mg  0 5 mg Oral Q8H PRN    menthol-methyl salicylate (BENGAY) 48-74 % cream   Apply externally 4x Daily PRN    methocarbamol (ROBAXIN) tablet 500 mg  500 mg Oral Q6H PRN    mirtazapine (REMERON) tablet 7 5 mg  7 5 mg Oral HS PRN    ondansetron (ZOFRAN-ODT) dispersible tablet 4 mg  4 mg Oral Q6H PRN    oxyCODONE-acetaminophen (PERCOCET) 5-325 mg per tablet 1 tablet  1 tablet Oral Q6H PRN    pantoprazole (PROTONIX) EC tablet 20 mg  20 mg Oral Early Morning    PARoxetine (PAXIL) tablet 60 mg  60 mg Oral Daily    polyethylene glycol (MIRALAX) packet 17 g  17 g Oral Daily    prazosin (MINIPRESS) capsule 2 mg  2 mg Oral Daily    pregabalin (LYRICA) capsule 100 mg  100 mg Oral TID    propranolol (INDERAL) tablet 20 mg  20 mg Oral BID before breakfast/lunch    QUEtiapine (SEROquel) tablet 50 mg  50 mg Oral 4x Daily (PC & HS)    senna-docusate sodium (SENOKOT S) 8 6-50 mg per tablet 1 tablet  1 tablet Oral Daily PRN    Valbenazine Tosylate CAPS 80 mg  80 mg Oral Daily       VTE Pharmacologic Prophylaxis: Enoxaparin (Lovenox)  VTE Mechanical Prophylaxis: sequential compression device    Portions of the record may have been created with voice recognition software  Occasional wrong word or "sound a like" substitutions may have occurred due to the inherent limitations of voice recognition software    Read the chart carefully and recognize, using context, where substitutions have occurred   ==  Jessica Figueroa MD  [address]  Internal Medicine Residency PGY-1

## 2021-09-09 NOTE — PHYSICAL THERAPY NOTE
PHYSICAL THERAPY NOTE          Patient Name: Analia Adams  CAJZA'G Date: 9/9/2021 09/09/21 1140   PT Last Visit   PT Visit Date 09/09/21   Note Type   Note Type Treatment   Pain Assessment   Pain Assessment Tool 0-10   Pain Score 5   Pain Location/Orientation Orientation: Bilateral;Location: Leg;Location: Back   Patient's Stated Pain Goal No pain   Hospital Pain Intervention(s) Repositioned; Ambulation/increased activity   Restrictions/Precautions   Weight Bearing Precautions Per Order No   Other Precautions Chair Alarm; Bed Alarm; Fall Risk;Pain   General   Chart Reviewed Yes   Response to Previous Treatment Patient with no complaints from previous session  Family/Caregiver Present No   Cognition   Arousal/Participation Alert; Responsive; Cooperative   Attention Attends with cues to redirect   Orientation Level Oriented to person;Oriented to place;Oriented to situation   Memory Decreased short term memory   Following Commands Follows one step commands with increased time or repetition   Comments Pt pleasant and cooperative to participate in therapy session  Bed Mobility   Supine to Sit 5  Supervision   Additional items Assist x 1; Increased time required   Sit to Supine Unable to assess   Additional Comments Pt supine in bed upon arrival  Pt sitting upright in bedside chair with chair alarm on with all needs within reach at the end of therapy session  Transfers   Sit to Stand 5  Supervision   Additional items Assist x 1; Increased time required;Verbal cues   Stand to Sit 5  Supervision   Additional items Assist x 1; Increased time required;Verbal cues   Toilet transfer 5  Supervision   Additional items Assist x 1; Increased time required;Standard toilet  (+ grab bars )   Additional Comments Transfers with RW; pt performed 6x STS throughout therapy session    Ambulation/Elevation   Gait pattern Excessively slow; Short stride; Foward flexed;Decreased foot clearance   Gait Assistance 5  Supervision   Additional items Assist x 1;Verbal cues   Assistive Device Rolling walker   Distance 25 ftx 5 trials   (seated rest break in between each gait trial )   Stair Management Assistance 5  Supervision   Additional items Assist x 1;Verbal cues   Stair Management Technique One rail L;Step to pattern; Foreward;Nonreciprocal  (b/l UEs on L HR )   Number of Stairs 3   Balance   Static Sitting Fair +   Dynamic Sitting Fair   Static Standing Fair   Dynamic Standing Fair   Ambulatory Fair   Endurance Deficit   Endurance Deficit Yes   Endurance Deficit Description fatigue, pain    Activity Tolerance   Activity Tolerance Patient tolerated treatment well   Nurse Made Aware RN cleared pt to participate in therapy session    Assessment   Prognosis Fair   Problem List Decreased endurance;Decreased mobility;Pain; Impaired balance   Assessment Pt seen for PT treatment session this date  Therapy session focused on bed mobility, functional transfers, gait training, stair training, and endurance training in order to improve overall mobility and independence  Pt requires assist of 1 for all mobility performed this date  Pt continues to be limited by increased fatigue requiring frequent rest breaks before, during and after all mobility tasks  Pt performed bed mobility tasks and functional transfers at S level  Pt ambulated short distances with RW at S level; multiple seated rest breaks required throughout gait training 2* increased fatigue and pain  Cues for upright posture and safety required  Pt negotiated 3 steps with L HR at S level using step to nonreciprocal pattern  Pt making progress toward goals  Pt was left sitting upright in bedside chair with chair alarm on at the end of PT session with all needs in reach  Pt would benefit from continued PT services while in hospital to address remaining limitations   PT to continue to follow pt and recommends home with HHPT and increased social support vs LTC; please also refer to OT cog assessment evaluation to see additional d/c recommendations  The patient's AM-PAC Basic Mobility Inpatient Short Form Raw Score is 19, Standardized Score is 42 48  A standardized score less than 42 9 suggests the patient may benefit from discharge to post-acute rehabilitation services  Please also refer to the recommendation of the Physical Therapist for safe discharge planning  Barriers to Discharge Decreased caregiver support   Goals   Patient Goals none stated    STG Expiration Date 09/12/21   PT Treatment Day 7   Plan   Treatment/Interventions Functional transfer training;LE strengthening/ROM; Elevations; Endurance training;Patient/family training;Equipment eval/education; Bed mobility;Gait training;Spoke to nursing;Spoke to case management   Progress Progressing toward goals   PT Frequency Other (Comment)  (3-5x/wk )   Recommendation   PT Discharge Recommendation Home with home health rehabilitation  (+ increased social support vs LTC; also see OT cog recs  )   Equipment Recommended Walker  (+ BSC )   Walker Package Recommended Wheeled walker   PT - OK to Discharge Yes  (once medically cleared )   Additional Comments Please also refer to OT cog assessement for further recommendations    AM-PAC Basic Mobility Inpatient   Turning in Bed Without Bedrails 4   Lying on Back to Sitting on Edge of Flat Bed 3   Moving Bed to Chair 3   Standing Up From Chair 3   Walk in Room 3   Climb 3-5 Stairs 3   Basic Mobility Inpatient Raw Score 19   Basic Mobility Standardized Score 42 48     Christine King, PT, DPT

## 2021-09-10 ENCOUNTER — PATIENT OUTREACH (OUTPATIENT)
Dept: INTERNAL MEDICINE CLINIC | Facility: CLINIC | Age: 56
End: 2021-09-10

## 2021-09-10 PROCEDURE — 99232 SBSQ HOSP IP/OBS MODERATE 35: CPT | Performed by: INTERNAL MEDICINE

## 2021-09-10 RX ADMIN — ASPIRIN 81 MG CHEWABLE TABLET 81 MG: 81 TABLET CHEWABLE at 09:54

## 2021-09-10 RX ADMIN — PANTOPRAZOLE SODIUM 20 MG: 20 TABLET, DELAYED RELEASE ORAL at 05:43

## 2021-09-10 RX ADMIN — LORAZEPAM 0.5 MG: 0.5 TABLET ORAL at 19:35

## 2021-09-10 RX ADMIN — PROPRANOLOL HYDROCHLORIDE 20 MG: 20 TABLET ORAL at 12:18

## 2021-09-10 RX ADMIN — HYDROXYZINE HYDROCHLORIDE 50 MG: 25 TABLET, FILM COATED ORAL at 12:19

## 2021-09-10 RX ADMIN — BUSPIRONE HYDROCHLORIDE 15 MG: 10 TABLET ORAL at 09:54

## 2021-09-10 RX ADMIN — ATORVASTATIN CALCIUM 40 MG: 40 TABLET, FILM COATED ORAL at 17:25

## 2021-09-10 RX ADMIN — PROPRANOLOL HYDROCHLORIDE 20 MG: 20 TABLET ORAL at 22:25

## 2021-09-10 RX ADMIN — BUSPIRONE HYDROCHLORIDE 15 MG: 10 TABLET ORAL at 17:25

## 2021-09-10 RX ADMIN — LITHIUM CARBONATE 300 MG: 300 TABLET, FILM COATED, EXTENDED RELEASE ORAL at 22:26

## 2021-09-10 RX ADMIN — LORAZEPAM 0.5 MG: 0.5 TABLET ORAL at 09:56

## 2021-09-10 RX ADMIN — VALBENAZINE 80 MG: 80 CAPSULE ORAL at 09:55

## 2021-09-10 RX ADMIN — QUETIAPINE FUMARATE 50 MG: 25 TABLET ORAL at 17:25

## 2021-09-10 RX ADMIN — AMLODIPINE BESYLATE 5 MG: 5 TABLET ORAL at 09:55

## 2021-09-10 RX ADMIN — OXYCODONE HYDROCHLORIDE AND ACETAMINOPHEN 1 TABLET: 5; 325 TABLET ORAL at 17:35

## 2021-09-10 RX ADMIN — OXYCODONE HYDROCHLORIDE AND ACETAMINOPHEN 1 TABLET: 5; 325 TABLET ORAL at 05:42

## 2021-09-10 RX ADMIN — ACETAMINOPHEN 650 MG: 325 TABLET, FILM COATED ORAL at 05:43

## 2021-09-10 RX ADMIN — PREGABALIN 100 MG: 100 CAPSULE ORAL at 09:55

## 2021-09-10 RX ADMIN — OXYCODONE HYDROCHLORIDE AND ACETAMINOPHEN 500 MG: 500 TABLET ORAL at 09:55

## 2021-09-10 RX ADMIN — OXYCODONE HYDROCHLORIDE AND ACETAMINOPHEN 500 MG: 500 TABLET ORAL at 17:25

## 2021-09-10 RX ADMIN — BUSPIRONE HYDROCHLORIDE 15 MG: 10 TABLET ORAL at 22:24

## 2021-09-10 RX ADMIN — FOLIC ACID 1000 MCG: 1 TABLET ORAL at 09:55

## 2021-09-10 RX ADMIN — QUETIAPINE FUMARATE 50 MG: 25 TABLET ORAL at 12:18

## 2021-09-10 RX ADMIN — DICLOFENAC SODIUM 2 G: 10 GEL TOPICAL at 22:24

## 2021-09-10 RX ADMIN — PREGABALIN 100 MG: 100 CAPSULE ORAL at 17:25

## 2021-09-10 RX ADMIN — ACETAMINOPHEN 650 MG: 325 TABLET, FILM COATED ORAL at 22:24

## 2021-09-10 RX ADMIN — ENOXAPARIN SODIUM 40 MG: 40 INJECTION SUBCUTANEOUS at 09:56

## 2021-09-10 RX ADMIN — PROPRANOLOL HYDROCHLORIDE 20 MG: 20 TABLET ORAL at 06:00

## 2021-09-10 RX ADMIN — PREGABALIN 100 MG: 100 CAPSULE ORAL at 22:30

## 2021-09-10 RX ADMIN — ACETAMINOPHEN 650 MG: 325 TABLET, FILM COATED ORAL at 17:25

## 2021-09-10 RX ADMIN — PAROXETINE HYDROCHLORIDE 60 MG: 20 TABLET, FILM COATED ORAL at 22:26

## 2021-09-10 RX ADMIN — QUETIAPINE FUMARATE 50 MG: 25 TABLET ORAL at 09:54

## 2021-09-10 RX ADMIN — FERROUS SULFATE TAB 325 MG (65 MG ELEMENTAL FE) 325 MG: 325 (65 FE) TAB at 09:55

## 2021-09-10 RX ADMIN — QUETIAPINE FUMARATE 50 MG: 25 TABLET ORAL at 22:23

## 2021-09-10 NOTE — PROGRESS NOTES
INTERNAL MEDICINE RESIDENCY PROGRESS NOTE     Name: Malina Curtis   Age & Sex: 60 y o  female   MRN: [id number]  Unit/Bed#: [facility] 908-01   Encounter: [id number]  Team: SOD Team A    PATIENT INFORMATION     Name: Malina Curtis   Age & Sex: 60 y o  female   MRN: [id number]  Hospital Stay Days: 21    ASSESSMENT/PLAN     Principal Problem:    Ambulatory dysfunction  Active Problems:    Chronic pain syndrome    Esophageal reflux    Generalized anxiety disorder    Tardive dyskinesia    History of CVA (cerebrovascular accident)    Mixed hyperlipidemia      Mixed hyperlipidemia  Assessment & Plan  -On Atorvastatin 40 mg PO qd    History of CVA (cerebrovascular accident)  Assessment & Plan  -On Aspirin 81 mg PO qd  -On Atorvastatin 40 mg PO qd    Tardive dyskinesia  Assessment & Plan  -Continue Valbenazine 80 mg daily  -appears to have slightly improved with addition of Lyrica 100 mg t i d     Generalized anxiety disorder  Assessment & Plan  Patient is on multiple psychiatric medications and was recently evaluated by inpatient psych at [address] [place] [address] with adjustments to her medications including buspirone 50 mg t i d , paroxetine 60 mg daily, quetiapine 50 mg 4 times daily, mirtazapine 7 5 mg q h s , lithium 300 mg q h s  And hydroxyzine 50 mg t i d  P r n  Fabiana Simonberniec Patient continues to have increased anxiety with daily morning panic attacks  Patient also reports that her anxiety is worsened by her lack of pain control  Plan:  · Continue medical management  · Continue current psychotropic medication   · She is psychiatrically cleared to go to SNF  · Patient could benefit from outpatient psychiatric evaluation and follow-up  · Confirmed psych medication regimen following discharge from [address] [place] [address] as noted above  Esophageal reflux  Assessment & Plan  Continue Protonix 20 mg daily  Chronic pain syndrome  Assessment & Plan  Patient has been on chronic opioids for low back and leg pain    She was seen in the clinic and discussed opioid tapering  Most recent opioid prescription per PDMP was for morphine 15 mg for 10 days (20 tablets)  Per chart review, concern for polypharmacy and suspicion of drug-seeking  · On Tylenol 650 mg q8h  · Lidocaine patch/Voltaren gel  · On Lyrica 100 mg  t i d   · Oxy IR 2 5 mg q6h prn  · Bengay topical ointment 4 times daily PRN  · 8/21 Added Aqua K    · 8/27 Added Robaxin  500 mg q 6h p r n     * Ambulatory dysfunction  Assessment & Plan  Patient with decreased physical mobility, at risk for falls  She uses walker and wheelchair at home  Presented to the ED requesting placement to SNF  · Encourage good body mechanics and assist with transfers  · Keep personal items and call bell close to the patient  · PT and OT- recommends post acute rehab  · Pending placement  Cough-resolved as of 8/30/2021  Assessment & Plan  Patient complains of progressive worsening cough from 08/25 to 8/26  Cough is nonproductive  Patient also reports associated chills, nasal congestion, and nausea  -COVID PCR negative  -chest x-ray unremarkable  -Robitussin for cough  -continue to trend white count and fevers    -resolved as of 8/29        Disposition: Patient is medically stable, on psychotropic medications along with appropriate pain control for her back pain  Case management has been actively working to obtain a suitable rehab for this patient  HUNTER spoke with patients son on 09/09, and as per the son, patient will not be able to return to her sons home after rehab because of narrow hallways  Therefore, HUNTER is now looking at placement for this patient and will send out referral for the same  We spoke with case management today and as per HUNTER, they are still actively working on placement for this patient  PT/OT recommendations- Home with home health rehab      SUBJECTIVE     Patient seen and examined  She reports she has mild back pain and also reports of some anxiety that is well controlled with her current medication regimen  No acute events overnight  OBJECTIVE     Vitals:    21 0746 21 1557 21 2224 09/10/21 0545   BP: 130/83 127/77 125/78 123/77   Pulse:   64 66   Resp: 16 16 20    Temp: 98 4 °F (36 9 °C) 98 °F (36 7 °C) (!) 97 2 °F (36 2 °C)    TempSrc:       SpO2:   98%    Weight:       Height:          Temperature:   Temp (24hrs), Av 9 °F (36 6 °C), Min:97 2 °F (36 2 °C), Max:98 4 °F (36 9 °C)    Temperature: (!) 97 2 °F (36 2 °C)  Intake & Output:  I/O        07 -  0700 701 - 09/10 0700    P  O  480 120    Total Intake(mL/kg) 480 (5 3) 120 (1 3)    Net +480 +120          Unmeasured Urine Occurrence  1 x    Unmeasured Stool Occurrence  1 x        Weights:   IBW (Ideal Body Weight): 47 8 kg    Body mass index is 37 99 kg/m²  Weight (last 2 days)     None        Physical Exam  HENT:      Head: Normocephalic and atraumatic  Eyes:      Conjunctiva/sclera: Conjunctivae normal    Cardiovascular:      Rate and Rhythm: Normal rate and regular rhythm  Pulses: Normal pulses  Heart sounds: Normal heart sounds  Pulmonary:      Effort: Pulmonary effort is normal       Breath sounds: Normal breath sounds  Abdominal:      General: Bowel sounds are normal       Palpations: Abdomen is soft  Skin:     General: Skin is warm and dry  Capillary Refill: Capillary refill takes less than 2 seconds  Neurological:      Mental Status: She is alert and oriented to person, place, and time  Mental status is at baseline  LABORATORY DATA     Labs: I have personally reviewed pertinent reports    Results from last 7 days   Lab Units 21  0743   WBC Thousand/uL 3 99*   HEMOGLOBIN g/dL 12 6   HEMATOCRIT % 38 8   PLATELETS Thousands/uL 214   NEUTROS PCT % 62   MONOS PCT % 8      Results from last 7 days   Lab Units 21  0743   POTASSIUM mmol/L 3 7   CHLORIDE mmol/L 109*   CO2 mmol/L 31   BUN mg/dL 6   CREATININE mg/dL 0 63   CALCIUM mg/dL 8 2* IMAGING & DIAGNOSTIC TESTING     Radiology Results: I have personally reviewed pertinent reports  No results found  Other Diagnostic Testing: I have personally reviewed pertinent reports      ACTIVE MEDICATIONS     Current Facility-Administered Medications   Medication Dose Route Frequency    acetaminophen (TYLENOL) tablet 650 mg  650 mg Oral Q8H [facility]    amLODIPine (NORVASC) tablet 5 mg  5 mg Oral Daily    ascorbic acid (VITAMIN C) tablet 500 mg  500 mg Oral BID    aspirin chewable tablet 81 mg  81 mg Oral Daily    atorvastatin (LIPITOR) tablet 40 mg  40 mg Oral Daily With Dinner    busPIRone (BUSPAR) tablet 15 mg  15 mg Oral TID    dextromethorphan-guaiFENesin (ROBITUSSIN DM) oral syrup 10 mL  10 mL Oral Q4H PRN    Diclofenac Sodium (VOLTAREN) 1 % topical gel 2 g  2 g Topical 4x Daily    enoxaparin (LOVENOX) subcutaneous injection 40 mg  40 mg Subcutaneous Daily    ferrous sulfate tablet 325 mg  325 mg Oral Daily With Breakfast    folic acid (FOLVITE) tablet 1,000 mcg  1,000 mcg Oral Daily    hydrOXYzine HCL (ATARAX) tablet 50 mg  50 mg Oral TID PRN    lidocaine (LIDODERM) 5 % patch 1 patch  1 patch Topical Daily    lidocaine (LIDODERM) 5 % patch 1 patch  1 patch Topical Daily    lithium carbonate (LITHOBID) CR tablet 300 mg  300 mg Oral HS    LORazepam (ATIVAN) tablet 0 5 mg  0 5 mg Oral Q8H PRN    menthol-methyl salicylate (BENGAY) 70-69 % cream   Apply externally 4x Daily PRN    methocarbamol (ROBAXIN) tablet 500 mg  500 mg Oral Q6H PRN    mirtazapine (REMERON) tablet 7 5 mg  7 5 mg Oral HS PRN    ondansetron (ZOFRAN-ODT) dispersible tablet 4 mg  4 mg Oral Q6H PRN    oxyCODONE-acetaminophen (PERCOCET) 5-325 mg per tablet 1 tablet  1 tablet Oral Q6H PRN    pantoprazole (PROTONIX) EC tablet 20 mg  20 mg Oral Early Morning    PARoxetine (PAXIL) tablet 60 mg  60 mg Oral Daily    polyethylene glycol (MIRALAX) packet 17 g  17 g Oral Daily    prazosin (MINIPRESS) capsule 2 mg  2 mg Oral Daily    pregabalin (LYRICA) capsule 100 mg  100 mg Oral TID    propranolol (INDERAL) tablet 20 mg  20 mg Oral BID before breakfast/lunch    QUEtiapine (SEROquel) tablet 50 mg  50 mg Oral 4x Daily (PC & HS)    senna-docusate sodium (SENOKOT S) 8 6-50 mg per tablet 1 tablet  1 tablet Oral Daily PRN    Valbenazine Tosylate CAPS 80 mg  80 mg Oral Daily       VTE Pharmacologic Prophylaxis: Enoxaparin (Lovenox)  VTE Mechanical Prophylaxis: sequential compression device    Portions of the record may have been created with voice recognition software  Occasional wrong word or "sound a like" substitutions may have occurred due to the inherent limitations of voice recognition software    Read the chart carefully and recognize, using context, where substitutions have occurred   ==  Venessa Kruse MD  [facility]  Internal Medicine Residency PGY-1

## 2021-09-10 NOTE — PROGRESS NOTES
SW received to VM from pt yesterday requesting help with referral to [employer] to help with housing   SW returned call to pt this date  Pt had same request on 9/8/21 when SW spoke to her but did not appear to recall this conversation  Sw has directed her to speak to her IN PT CM Team re this and her SNF placement  SW did reinforce they are working hard to locate a safe placement for her  Support offered  SW has left message for Trevon Watts  IN PT RN/CM re same  SW was able to speak with IN PT RN/CM Trevon Watts   They are working on all options for placement and will f/u with pt

## 2021-09-11 PROCEDURE — 99232 SBSQ HOSP IP/OBS MODERATE 35: CPT | Performed by: INTERNAL MEDICINE

## 2021-09-11 RX ORDER — LORAZEPAM 2 MG/ML
1 INJECTION INTRAMUSCULAR ONCE
Status: DISCONTINUED | OUTPATIENT
Start: 2021-09-11 | End: 2021-09-11

## 2021-09-11 RX ADMIN — PROPRANOLOL HYDROCHLORIDE 20 MG: 20 TABLET ORAL at 05:38

## 2021-09-11 RX ADMIN — OXYCODONE HYDROCHLORIDE AND ACETAMINOPHEN 1 TABLET: 5; 325 TABLET ORAL at 05:43

## 2021-09-11 RX ADMIN — FERROUS SULFATE TAB 325 MG (65 MG ELEMENTAL FE) 325 MG: 325 (65 FE) TAB at 09:03

## 2021-09-11 RX ADMIN — ENOXAPARIN SODIUM 40 MG: 40 INJECTION SUBCUTANEOUS at 09:04

## 2021-09-11 RX ADMIN — HYDROXYZINE HYDROCHLORIDE 50 MG: 25 TABLET, FILM COATED ORAL at 17:50

## 2021-09-11 RX ADMIN — PANTOPRAZOLE SODIUM 20 MG: 20 TABLET, DELAYED RELEASE ORAL at 05:41

## 2021-09-11 RX ADMIN — QUETIAPINE FUMARATE 50 MG: 25 TABLET ORAL at 17:04

## 2021-09-11 RX ADMIN — FOLIC ACID 1000 MCG: 1 TABLET ORAL at 09:03

## 2021-09-11 RX ADMIN — BUSPIRONE HYDROCHLORIDE 15 MG: 10 TABLET ORAL at 17:05

## 2021-09-11 RX ADMIN — BUSPIRONE HYDROCHLORIDE 15 MG: 10 TABLET ORAL at 09:03

## 2021-09-11 RX ADMIN — ASPIRIN 81 MG CHEWABLE TABLET 81 MG: 81 TABLET CHEWABLE at 09:04

## 2021-09-11 RX ADMIN — ACETAMINOPHEN 650 MG: 325 TABLET, FILM COATED ORAL at 21:25

## 2021-09-11 RX ADMIN — VALBENAZINE 80 MG: 80 CAPSULE ORAL at 09:05

## 2021-09-11 RX ADMIN — ACETAMINOPHEN 650 MG: 325 TABLET, FILM COATED ORAL at 12:58

## 2021-09-11 RX ADMIN — PAROXETINE HYDROCHLORIDE 60 MG: 20 TABLET, FILM COATED ORAL at 21:29

## 2021-09-11 RX ADMIN — ATORVASTATIN CALCIUM 40 MG: 40 TABLET, FILM COATED ORAL at 17:05

## 2021-09-11 RX ADMIN — LITHIUM CARBONATE 300 MG: 300 TABLET, FILM COATED, EXTENDED RELEASE ORAL at 21:30

## 2021-09-11 RX ADMIN — HYDROXYZINE HYDROCHLORIDE 50 MG: 25 TABLET, FILM COATED ORAL at 09:04

## 2021-09-11 RX ADMIN — QUETIAPINE FUMARATE 50 MG: 25 TABLET ORAL at 12:59

## 2021-09-11 RX ADMIN — QUETIAPINE FUMARATE 50 MG: 25 TABLET ORAL at 09:04

## 2021-09-11 RX ADMIN — PREGABALIN 100 MG: 100 CAPSULE ORAL at 09:05

## 2021-09-11 RX ADMIN — PREGABALIN 100 MG: 100 CAPSULE ORAL at 21:25

## 2021-09-11 RX ADMIN — OXYCODONE HYDROCHLORIDE AND ACETAMINOPHEN 500 MG: 500 TABLET ORAL at 09:03

## 2021-09-11 RX ADMIN — PREGABALIN 100 MG: 100 CAPSULE ORAL at 17:05

## 2021-09-11 RX ADMIN — LORAZEPAM 0.5 MG: 0.5 TABLET ORAL at 17:50

## 2021-09-11 RX ADMIN — BUSPIRONE HYDROCHLORIDE 15 MG: 10 TABLET ORAL at 21:26

## 2021-09-11 RX ADMIN — OXYCODONE HYDROCHLORIDE AND ACETAMINOPHEN 1 TABLET: 5; 325 TABLET ORAL at 19:33

## 2021-09-11 RX ADMIN — OXYCODONE HYDROCHLORIDE AND ACETAMINOPHEN 1 TABLET: 5; 325 TABLET ORAL at 12:59

## 2021-09-11 RX ADMIN — PRAZOSIN HYDROCHLORIDE 2 MG: 2 CAPSULE ORAL at 09:04

## 2021-09-11 RX ADMIN — QUETIAPINE FUMARATE 50 MG: 25 TABLET ORAL at 21:25

## 2021-09-11 RX ADMIN — LORAZEPAM 0.5 MG: 0.5 TABLET ORAL at 09:04

## 2021-09-11 RX ADMIN — ACETAMINOPHEN 650 MG: 325 TABLET, FILM COATED ORAL at 05:41

## 2021-09-11 RX ADMIN — AMLODIPINE BESYLATE 5 MG: 5 TABLET ORAL at 09:03

## 2021-09-11 RX ADMIN — OXYCODONE HYDROCHLORIDE AND ACETAMINOPHEN 500 MG: 500 TABLET ORAL at 17:05

## 2021-09-11 NOTE — PROGRESS NOTES
INTERNAL MEDICINE RESIDENCY PROGRESS NOTE     Name: Aashish Gonzalez   Age & Sex: 60 y o  female   MRN: [id number]  [facility]   Encounter: [id number]  Team: SOD Team A    PATIENT INFORMATION     Name: Aashish Gonzalez   Age & Sex: 60 y o  female   MRN: [id number]  Hospital Stay Days: 25    ASSESSMENT/PLAN     Principal Problem:    Ambulatory dysfunction  Active Problems:    Chronic pain syndrome    Esophageal reflux    Generalized anxiety disorder    Tardive dyskinesia    History of CVA (cerebrovascular accident)    Mixed hyperlipidemia      Mixed hyperlipidemia  Assessment & Plan  -On Atorvastatin 40 mg PO qd    History of CVA (cerebrovascular accident)  Assessment & Plan  -On Aspirin 81 mg PO qd  -On Atorvastatin 40 mg PO qd    Tardive dyskinesia  Assessment & Plan  -Continue Valbenazine 80 mg daily  -appears to have slightly improved with addition of Lyrica 100 mg t i d     Generalized anxiety disorder  Assessment & Plan  Patient is on multiple psychiatric medications and was recently evaluated by inpatient psych at [facility] with adjustments to her medications including buspirone 50 mg t i d , paroxetine 60 mg daily, quetiapine 50 mg 4 times daily, mirtazapine 7 5 mg q h s , lithium 300 mg q h s  And hydroxyzine 50 mg t i d  P r n  [facility] Patient continues to have increased anxiety with daily morning panic attacks  Patient also reports that her anxiety is worsened by her lack of pain control  Plan:  · Continue medical management  · Continue current psychotropic medication   · She is psychiatrically cleared to go to SNF  · Patient could benefit from outpatient psychiatric evaluation and follow-up  · Confirmed psych medication regimen following discharge from [address] [place] [address] as noted above  Esophageal reflux  Assessment & Plan  Continue Protonix 20 mg daily  Chronic pain syndrome  Assessment & Plan  Patient has been on chronic opioids for low back and leg pain    She was seen in the clinic and discussed opioid tapering  Most recent opioid prescription per PDMP was for morphine 15 mg for 10 days (20 tablets)  Per chart review, concern for polypharmacy and suspicion of drug-seeking  · On Tylenol 650 mg q8h  · Lidocaine patch/Voltaren gel  · On Lyrica 100 mg  t i d   · Oxy IR 2 5 mg q6h prn  · Bengay topical ointment 4 times daily PRN  ·  Added Aqua K    ·  Added Robaxin  500 mg q 6h p r n     * Ambulatory dysfunction  Assessment & Plan  Patient with decreased physical mobility, at risk for falls  She uses walker and wheelchair at home  Presented to the ED requesting placement to SNF  · Encourage good body mechanics and assist with transfers  · Keep personal items and call bell close to the patient  · PT and OT- recommends post acute rehab  · Pending placement  Cough-resolved as of 2021  Assessment & Plan  Patient complains of progressive worsening cough from  to   Cough is nonproductive  Patient also reports associated chills, nasal congestion, and nausea  -COVID PCR negative  -chest x-ray unremarkable  -Robitussin for cough  -continue to trend white count and fevers    -resolved as of       Disposition:  Awaiting rehab placement     SUBJECTIVE     Patient seen and examined  No acute events overnight  Patient does note that her anxiety is better when she takes Ativan and Atarax at the same time  OBJECTIVE     Vitals:    09/10/21 0813 09/10/21 1651 09/10/21 2204 21 0537   BP: 122/76 114/65 121/75 121/76   Pulse: 59      Resp: 18 15     Temp: 98 °F (36 7 °C) 98 7 °F (37 1 °C) 98 1 °F (36 7 °C)    TempSrc:       SpO2: 98%      Weight:       Height:          Temperature:   Temp (24hrs), Av 4 °F (36 9 °C), Min:98 1 °F (36 7 °C), Max:98 7 °F (37 1 °C)    Temperature: 98 1 °F (36 7 °C)  Intake & Output:  I/O        07 - 09/10 0700 09/10 07 -  0700    P  O  120 600    Total Intake(mL/kg) 120 (1 3) 600 (6 6)    Net +120 +600 Unmeasured Urine Occurrence 1 x 2 x    Unmeasured Stool Occurrence 1 x         Weights:   IBW (Ideal Body Weight): 47 8 kg    Body mass index is 37 99 kg/m²  Weight (last 2 days)     None        Physical Exam  Constitutional:       General: She is not in acute distress  Appearance: She is normal weight  HENT:      Head: Normocephalic and atraumatic  Eyes:      General: No scleral icterus  Pupils: Pupils are equal, round, and reactive to light  Cardiovascular:      Rate and Rhythm: Normal rate and regular rhythm  Pulses: Normal pulses  Heart sounds: No murmur heard  Pulmonary:      Effort: Pulmonary effort is normal  No respiratory distress  Breath sounds: Normal breath sounds  Abdominal:      General: Bowel sounds are normal  There is no distension  Tenderness: There is no abdominal tenderness  Musculoskeletal:         General: No swelling  Normal range of motion  Skin:     General: Skin is warm and dry  Capillary Refill: Capillary refill takes less than 2 seconds  Neurological:      General: No focal deficit present  Mental Status: She is alert and oriented to person, place, and time  Mental status is at baseline  Psychiatric:         Mood and Affect: Mood normal          Behavior: Behavior normal        LABORATORY DATA     Labs: I have personally reviewed pertinent reports  Results from last 7 days   Lab Units 09/06/21  0743   WBC Thousand/uL 3 99*   HEMOGLOBIN g/dL 12 6   HEMATOCRIT % 38 8   PLATELETS Thousands/uL 214   NEUTROS PCT % 62   MONOS PCT % 8      Results from last 7 days   Lab Units 09/06/21  0743   POTASSIUM mmol/L 3 7   CHLORIDE mmol/L 109*   CO2 mmol/L 31   BUN mg/dL 6   CREATININE mg/dL 0 63   CALCIUM mg/dL 8 2*                            IMAGING & DIAGNOSTIC TESTING     Radiology Results: I have personally reviewed pertinent reports  No results found    Other Diagnostic Testing: I have personally reviewed pertinent reports      ACTIVE MEDICATIONS     Current Facility-Administered Medications   Medication Dose Route Frequency    acetaminophen (TYLENOL) tablet 650 mg  650 mg Oral Q8H [facility]    amLODIPine (NORVASC) tablet 5 mg  5 mg Oral Daily    ascorbic acid (VITAMIN C) tablet 500 mg  500 mg Oral BID    aspirin chewable tablet 81 mg  81 mg Oral Daily    atorvastatin (LIPITOR) tablet 40 mg  40 mg Oral Daily With Dinner    busPIRone (BUSPAR) tablet 15 mg  15 mg Oral TID    dextromethorphan-guaiFENesin (ROBITUSSIN DM) oral syrup 10 mL  10 mL Oral Q4H PRN    Diclofenac Sodium (VOLTAREN) 1 % topical gel 2 g  2 g Topical 4x Daily    enoxaparin (LOVENOX) subcutaneous injection 40 mg  40 mg Subcutaneous Daily    ferrous sulfate tablet 325 mg  325 mg Oral Daily With Breakfast    folic acid (FOLVITE) tablet 1,000 mcg  1,000 mcg Oral Daily    hydrOXYzine HCL (ATARAX) tablet 50 mg  50 mg Oral TID PRN    lidocaine (LIDODERM) 5 % patch 1 patch  1 patch Topical Daily    lidocaine (LIDODERM) 5 % patch 1 patch  1 patch Topical Daily    lithium carbonate (LITHOBID) CR tablet 300 mg  300 mg Oral HS    LORazepam (ATIVAN) tablet 0 5 mg  0 5 mg Oral Q8H PRN    menthol-methyl salicylate (BENGAY) 24-78 % cream   Apply externally 4x Daily PRN    methocarbamol (ROBAXIN) tablet 500 mg  500 mg Oral Q6H PRN    mirtazapine (REMERON) tablet 7 5 mg  7 5 mg Oral HS PRN    ondansetron (ZOFRAN-ODT) dispersible tablet 4 mg  4 mg Oral Q6H PRN    oxyCODONE-acetaminophen (PERCOCET) 5-325 mg per tablet 1 tablet  1 tablet Oral Q6H PRN    pantoprazole (PROTONIX) EC tablet 20 mg  20 mg Oral Early Morning    PARoxetine (PAXIL) tablet 60 mg  60 mg Oral Daily    polyethylene glycol (MIRALAX) packet 17 g  17 g Oral Daily    prazosin (MINIPRESS) capsule 2 mg  2 mg Oral Daily    pregabalin (LYRICA) capsule 100 mg  100 mg Oral TID    propranolol (INDERAL) tablet 20 mg  20 mg Oral BID before breakfast/lunch    QUEtiapine (SEROquel) tablet 50 mg  50 mg Oral 4x Daily (PC & HS)    senna-docusate sodium (SENOKOT S) 8 6-50 mg per tablet 1 tablet  1 tablet Oral Daily PRN    Valbenazine Tosylate CAPS 80 mg  80 mg Oral Daily       VTE Pharmacologic Prophylaxis: Enoxaparin (Lovenox)  VTE Mechanical Prophylaxis: sequential compression device    Portions of the record may have been created with voice recognition software  Occasional wrong word or "sound a like" substitutions may have occurred due to the inherent limitations of voice recognition software    Read the chart carefully and recognize, using context, where substitutions have occurred   ==  Rocío Kwok, [facility] [facility]  Internal Medicine Residency PGY-3

## 2021-09-12 PROCEDURE — 99232 SBSQ HOSP IP/OBS MODERATE 35: CPT | Performed by: INTERNAL MEDICINE

## 2021-09-12 RX ADMIN — BUSPIRONE HYDROCHLORIDE 15 MG: 10 TABLET ORAL at 16:53

## 2021-09-12 RX ADMIN — PREGABALIN 100 MG: 100 CAPSULE ORAL at 16:53

## 2021-09-12 RX ADMIN — ACETAMINOPHEN 650 MG: 325 TABLET, FILM COATED ORAL at 12:21

## 2021-09-12 RX ADMIN — PRAZOSIN HYDROCHLORIDE 2 MG: 2 CAPSULE ORAL at 09:36

## 2021-09-12 RX ADMIN — AMLODIPINE BESYLATE 5 MG: 5 TABLET ORAL at 09:35

## 2021-09-12 RX ADMIN — LORAZEPAM 0.5 MG: 0.5 TABLET ORAL at 09:35

## 2021-09-12 RX ADMIN — BUSPIRONE HYDROCHLORIDE 15 MG: 10 TABLET ORAL at 09:34

## 2021-09-12 RX ADMIN — LITHIUM CARBONATE 300 MG: 300 TABLET, FILM COATED, EXTENDED RELEASE ORAL at 22:30

## 2021-09-12 RX ADMIN — PROPRANOLOL HYDROCHLORIDE 20 MG: 20 TABLET ORAL at 12:20

## 2021-09-12 RX ADMIN — ACETAMINOPHEN 650 MG: 325 TABLET, FILM COATED ORAL at 05:07

## 2021-09-12 RX ADMIN — VALBENAZINE 80 MG: 80 CAPSULE ORAL at 09:36

## 2021-09-12 RX ADMIN — ACETAMINOPHEN 650 MG: 325 TABLET, FILM COATED ORAL at 22:29

## 2021-09-12 RX ADMIN — PREGABALIN 100 MG: 100 CAPSULE ORAL at 22:29

## 2021-09-12 RX ADMIN — PREGABALIN 100 MG: 100 CAPSULE ORAL at 09:34

## 2021-09-12 RX ADMIN — QUETIAPINE FUMARATE 50 MG: 25 TABLET ORAL at 09:35

## 2021-09-12 RX ADMIN — OXYCODONE HYDROCHLORIDE AND ACETAMINOPHEN 1 TABLET: 5; 325 TABLET ORAL at 16:53

## 2021-09-12 RX ADMIN — ASPIRIN 81 MG CHEWABLE TABLET 81 MG: 81 TABLET CHEWABLE at 09:34

## 2021-09-12 RX ADMIN — PANTOPRAZOLE SODIUM 20 MG: 20 TABLET, DELAYED RELEASE ORAL at 05:08

## 2021-09-12 RX ADMIN — ENOXAPARIN SODIUM 40 MG: 40 INJECTION SUBCUTANEOUS at 09:35

## 2021-09-12 RX ADMIN — OXYCODONE HYDROCHLORIDE AND ACETAMINOPHEN 1 TABLET: 5; 325 TABLET ORAL at 09:34

## 2021-09-12 RX ADMIN — FOLIC ACID 1000 MCG: 1 TABLET ORAL at 09:35

## 2021-09-12 RX ADMIN — OXYCODONE HYDROCHLORIDE AND ACETAMINOPHEN 500 MG: 500 TABLET ORAL at 16:53

## 2021-09-12 RX ADMIN — QUETIAPINE FUMARATE 50 MG: 25 TABLET ORAL at 12:22

## 2021-09-12 RX ADMIN — OXYCODONE HYDROCHLORIDE AND ACETAMINOPHEN 500 MG: 500 TABLET ORAL at 09:34

## 2021-09-12 RX ADMIN — ATORVASTATIN CALCIUM 40 MG: 40 TABLET, FILM COATED ORAL at 16:53

## 2021-09-12 RX ADMIN — QUETIAPINE FUMARATE 50 MG: 25 TABLET ORAL at 16:53

## 2021-09-12 RX ADMIN — HYDROXYZINE HYDROCHLORIDE 50 MG: 25 TABLET, FILM COATED ORAL at 12:22

## 2021-09-12 RX ADMIN — FERROUS SULFATE TAB 325 MG (65 MG ELEMENTAL FE) 325 MG: 325 (65 FE) TAB at 09:34

## 2021-09-12 RX ADMIN — QUETIAPINE FUMARATE 50 MG: 25 TABLET ORAL at 22:29

## 2021-09-12 RX ADMIN — LORAZEPAM 0.5 MG: 0.5 TABLET ORAL at 16:54

## 2021-09-12 RX ADMIN — BUSPIRONE HYDROCHLORIDE 15 MG: 10 TABLET ORAL at 22:29

## 2021-09-12 RX ADMIN — PAROXETINE HYDROCHLORIDE 60 MG: 20 TABLET, FILM COATED ORAL at 22:29

## 2021-09-12 NOTE — PROGRESS NOTES
INTERNAL MEDICINE RESIDENCY PROGRESS NOTE     Name: Ismael Fried   Age & Sex: 60 y o  female   MRN: [id number]  Unit/Bed#: [facility] 908-01   Encounter: [id number]  Team: SOD Team A    PATIENT INFORMATION     Name: Ismael Fried   Age & Sex: 60 y o  female   MRN: [id number]  Hospital Stay Days: 21    ASSESSMENT/PLAN     Principal Problem:    Ambulatory dysfunction  Active Problems:    Chronic pain syndrome    Esophageal reflux    Generalized anxiety disorder    Tardive dyskinesia    History of CVA (cerebrovascular accident)    Mixed hyperlipidemia      Mixed hyperlipidemia  Assessment & Plan  -On Atorvastatin 40 mg PO qd    History of CVA (cerebrovascular accident)  Assessment & Plan  -On Aspirin 81 mg PO qd  -On Atorvastatin 40 mg PO qd    Tardive dyskinesia  Assessment & Plan  -Continue Valbenazine 80 mg daily  -appears to have slightly improved with addition of Lyrica 100 mg t i d     Generalized anxiety disorder  Assessment & Plan  Patient is on multiple psychiatric medications and was recently evaluated by inpatient psych at [address] [place] [address] with adjustments to her medications including buspirone 50 mg t i d , paroxetine 60 mg daily, quetiapine 50 mg 4 times daily, mirtazapine 7 5 mg q h s , lithium 300 mg q h s  And hydroxyzine 50 mg t i d  P r n  Misti Lee Patient continues to have increased anxiety with daily morning panic attacks  Patient also reports that her anxiety is worsened by her lack of pain control  Plan:  · Continue medical management  · Continue current psychotropic medication   · She is psychiatrically cleared to go to SNF  · Patient could benefit from outpatient psychiatric evaluation and follow-up  · Confirmed psych medication regimen following discharge from [address] [place] [address] as noted above  Esophageal reflux  Assessment & Plan  Continue Protonix 20 mg daily  Chronic pain syndrome  Assessment & Plan  Patient has been on chronic opioids for low back and leg pain    She was seen in the clinic and discussed opioid tapering  Most recent opioid prescription per PDMP was for morphine 15 mg for 10 days (20 tablets)  Per chart review, concern for polypharmacy and suspicion of drug-seeking  · On Tylenol 650 mg q8h  · Lidocaine patch/Voltaren gel  · On Lyrica 100 mg  T i d    · Oxy IR 2 5 mg q6h prn  · Bengay topical ointment 4 times daily PRN  ·  Added Aqua K    ·  Added Robaxin  500 mg q 6h p r n     * Ambulatory dysfunction  Assessment & Plan  Patient with decreased physical mobility, at risk for falls  She uses walker and wheelchair at home  Presented to the ED requesting placement to SNF  · Encourage good body mechanics and assist with transfers  · Keep personal items and call bell close to the patient  · PT and OT- recommends post acute rehab  · Pending placement  Cough-resolved as of 2021  Assessment & Plan  Patient complains of progressive worsening cough from  to   Cough is nonproductive  Patient also reports associated chills, nasal congestion, and nausea  -COVID PCR negative  -chest x-ray unremarkable  -Robitussin for cough  -continue to trend white count and fevers    -resolved as of       Disposition: Patient is medically stable and on her psychotrophic medication regimen  Patient is awaiting placement  We are working with case management on placement for this patient  SUBJECTIVE     Patient seen and examined  No acute events overnight  Patient has mild anxiety that is being maanged with current medication therapy      OBJECTIVE     Vitals:    21 0856 21 1531 21 2252 21 2253   BP: 122/76 96/73 101/59 101/59   Pulse: 64      Resp:  18 18 16   Temp: 98 2 °F (36 8 °C) 98 2 °F (36 8 °C) 98 1 °F (36 7 °C) 98 1 °F (36 7 °C)   TempSrc:       SpO2: 98%      Weight:       Height:          Temperature:   Temp (24hrs), Av 2 °F (36 8 °C), Min:98 1 °F (36 7 °C), Max:98 2 °F (36 8 °C)    Temperature: 98 1 °F (36 7 °C)  Intake & Output:  I/O       09/10 0701 - 09/11 0700 09/11 0701 - 09/12 0700    P  O  600 700    Total Intake(mL/kg) 600 (6 6) 700 (7 7)    Net +600 +700          Unmeasured Urine Occurrence 2 x 2 x    Unmeasured Stool Occurrence  2 x        Weights:   IBW (Ideal Body Weight): 47 8 kg    Body mass index is 37 99 kg/m²  Weight (last 2 days)     None        Physical Exam  HENT:      Head: Normocephalic and atraumatic  Eyes:      Conjunctiva/sclera: Conjunctivae normal    Cardiovascular:      Rate and Rhythm: Normal rate and regular rhythm  Pulses: Normal pulses  Heart sounds: Normal heart sounds  Pulmonary:      Effort: Pulmonary effort is normal       Breath sounds: Normal breath sounds  Abdominal:      General: Bowel sounds are normal       Palpations: Abdomen is soft  Skin:     General: Skin is warm and dry  Capillary Refill: Capillary refill takes less than 2 seconds  Neurological:      Mental Status: She is alert and oriented to person, place, and time  LABORATORY DATA     Labs: I have personally reviewed pertinent reports  Results from last 7 days   Lab Units 09/06/21  0743   WBC Thousand/uL 3 99*   HEMOGLOBIN g/dL 12 6   HEMATOCRIT % 38 8   PLATELETS Thousands/uL 214   NEUTROS PCT % 62   MONOS PCT % 8      Results from last 7 days   Lab Units 09/06/21  0743   POTASSIUM mmol/L 3 7   CHLORIDE mmol/L 109*   CO2 mmol/L 31   BUN mg/dL 6   CREATININE mg/dL 0 63   CALCIUM mg/dL 8 2*                            IMAGING & DIAGNOSTIC TESTING     Radiology Results: I have personally reviewed pertinent reports  No results found  Other Diagnostic Testing: I have personally reviewed pertinent reports      ACTIVE MEDICATIONS     Current Facility-Administered Medications   Medication Dose Route Frequency    acetaminophen (TYLENOL) tablet 650 mg  650 mg Oral Q8H [address]    amLODIPine (NORVASC) tablet 5 mg  5 mg Oral Daily    ascorbic acid (VITAMIN C) tablet 500 mg  500 mg Oral BID    aspirin chewable tablet 81 mg  81 mg Oral Daily    atorvastatin (LIPITOR) tablet 40 mg  40 mg Oral Daily With Dinner    busPIRone (BUSPAR) tablet 15 mg  15 mg Oral TID    dextromethorphan-guaiFENesin (ROBITUSSIN DM) oral syrup 10 mL  10 mL Oral Q4H PRN    Diclofenac Sodium (VOLTAREN) 1 % topical gel 2 g  2 g Topical 4x Daily    enoxaparin (LOVENOX) subcutaneous injection 40 mg  40 mg Subcutaneous Daily    ferrous sulfate tablet 325 mg  325 mg Oral Daily With Breakfast    folic acid (FOLVITE) tablet 1,000 mcg  1,000 mcg Oral Daily    hydrOXYzine HCL (ATARAX) tablet 50 mg  50 mg Oral TID PRN    lidocaine (LIDODERM) 5 % patch 1 patch  1 patch Topical Daily    lidocaine (LIDODERM) 5 % patch 1 patch  1 patch Topical Daily    lithium carbonate (LITHOBID) CR tablet 300 mg  300 mg Oral HS    LORazepam (ATIVAN) tablet 0 5 mg  0 5 mg Oral Q8H PRN    menthol-methyl salicylate (BENGAY) 06-92 % cream   Apply externally 4x Daily PRN    methocarbamol (ROBAXIN) tablet 500 mg  500 mg Oral Q6H PRN    mirtazapine (REMERON) tablet 7 5 mg  7 5 mg Oral HS PRN    ondansetron (ZOFRAN-ODT) dispersible tablet 4 mg  4 mg Oral Q6H PRN    oxyCODONE-acetaminophen (PERCOCET) 5-325 mg per tablet 1 tablet  1 tablet Oral Q6H PRN    pantoprazole (PROTONIX) EC tablet 20 mg  20 mg Oral Early Morning    PARoxetine (PAXIL) tablet 60 mg  60 mg Oral Daily    polyethylene glycol (MIRALAX) packet 17 g  17 g Oral Daily    prazosin (MINIPRESS) capsule 2 mg  2 mg Oral Daily    pregabalin (LYRICA) capsule 100 mg  100 mg Oral TID    propranolol (INDERAL) tablet 20 mg  20 mg Oral BID before breakfast/lunch    QUEtiapine (SEROquel) tablet 50 mg  50 mg Oral 4x Daily (PC & HS)    senna-docusate sodium (SENOKOT S) 8 6-50 mg per tablet 1 tablet  1 tablet Oral Daily PRN    Valbenazine Tosylate CAPS 80 mg  80 mg Oral Daily       VTE Pharmacologic Prophylaxis: Enoxaparin (Lovenox)  VTE Mechanical Prophylaxis: sequential compression device    Portions of the record may have been created with voice recognition software  Occasional wrong word or "sound a like" substitutions may have occurred due to the inherent limitations of voice recognition software    Read the chart carefully and recognize, using context, where substitutions have occurred   ==  Shanda Quezada MD  [facility]  Internal Medicine Residency PGY-1

## 2021-09-13 LAB
BASOPHILS # BLD AUTO: 0.03 THOUSANDS/ΜL (ref 0–0.1)
BASOPHILS NFR BLD AUTO: 1 % (ref 0–1)
EOSINOPHIL # BLD AUTO: 0.14 THOUSAND/ΜL (ref 0–0.61)
EOSINOPHIL NFR BLD AUTO: 4 % (ref 0–6)
ERYTHROCYTE [DISTWIDTH] IN BLOOD BY AUTOMATED COUNT: 13.9 % (ref 11.6–15.1)
HCT VFR BLD AUTO: 42 % (ref 34.8–46.1)
HGB BLD-MCNC: 13.4 G/DL (ref 11.5–15.4)
IMM GRANULOCYTES # BLD AUTO: 0.01 THOUSAND/UL (ref 0–0.2)
IMM GRANULOCYTES NFR BLD AUTO: 0 % (ref 0–2)
LYMPHOCYTES # BLD AUTO: 1.05 THOUSANDS/ΜL (ref 0.6–4.47)
LYMPHOCYTES NFR BLD AUTO: 26 % (ref 14–44)
MCH RBC QN AUTO: 29.4 PG (ref 26.8–34.3)
MCHC RBC AUTO-ENTMCNC: 31.9 G/DL (ref 31.4–37.4)
MCV RBC AUTO: 92 FL (ref 82–98)
MONOCYTES # BLD AUTO: 0.36 THOUSAND/ΜL (ref 0.17–1.22)
MONOCYTES NFR BLD AUTO: 9 % (ref 4–12)
NEUTROPHILS # BLD AUTO: 2.44 THOUSANDS/ΜL (ref 1.85–7.62)
NEUTS SEG NFR BLD AUTO: 60 % (ref 43–75)
NRBC BLD AUTO-RTO: 0 /100 WBCS
PLATELET # BLD AUTO: 277 THOUSANDS/UL (ref 149–390)
PMV BLD AUTO: 9.7 FL (ref 8.9–12.7)
RBC # BLD AUTO: 4.56 MILLION/UL (ref 3.81–5.12)
WBC # BLD AUTO: 4.03 THOUSAND/UL (ref 4.31–10.16)

## 2021-09-13 PROCEDURE — 85025 COMPLETE CBC W/AUTO DIFF WBC: CPT | Performed by: STUDENT IN AN ORGANIZED HEALTH CARE EDUCATION/TRAINING PROGRAM

## 2021-09-13 PROCEDURE — 99232 SBSQ HOSP IP/OBS MODERATE 35: CPT | Performed by: HOSPITALIST

## 2021-09-13 RX ADMIN — PREGABALIN 100 MG: 100 CAPSULE ORAL at 21:10

## 2021-09-13 RX ADMIN — PREGABALIN 100 MG: 100 CAPSULE ORAL at 08:42

## 2021-09-13 RX ADMIN — ACETAMINOPHEN 650 MG: 325 TABLET, FILM COATED ORAL at 05:17

## 2021-09-13 RX ADMIN — BUSPIRONE HYDROCHLORIDE 15 MG: 10 TABLET ORAL at 15:32

## 2021-09-13 RX ADMIN — QUETIAPINE FUMARATE 50 MG: 25 TABLET ORAL at 11:34

## 2021-09-13 RX ADMIN — BUSPIRONE HYDROCHLORIDE 15 MG: 10 TABLET ORAL at 08:42

## 2021-09-13 RX ADMIN — PANTOPRAZOLE SODIUM 20 MG: 20 TABLET, DELAYED RELEASE ORAL at 05:19

## 2021-09-13 RX ADMIN — HYDROXYZINE HYDROCHLORIDE 50 MG: 25 TABLET, FILM COATED ORAL at 10:20

## 2021-09-13 RX ADMIN — LORAZEPAM 0.5 MG: 0.5 TABLET ORAL at 15:33

## 2021-09-13 RX ADMIN — DICLOFENAC SODIUM 2 G: 10 GEL TOPICAL at 17:50

## 2021-09-13 RX ADMIN — PROPRANOLOL HYDROCHLORIDE 20 MG: 20 TABLET ORAL at 15:35

## 2021-09-13 RX ADMIN — QUETIAPINE FUMARATE 50 MG: 25 TABLET ORAL at 08:42

## 2021-09-13 RX ADMIN — OXYCODONE HYDROCHLORIDE AND ACETAMINOPHEN 500 MG: 500 TABLET ORAL at 08:42

## 2021-09-13 RX ADMIN — FERROUS SULFATE TAB 325 MG (65 MG ELEMENTAL FE) 325 MG: 325 (65 FE) TAB at 08:41

## 2021-09-13 RX ADMIN — PAROXETINE HYDROCHLORIDE 60 MG: 20 TABLET, FILM COATED ORAL at 21:11

## 2021-09-13 RX ADMIN — DICLOFENAC SODIUM 2 G: 10 GEL TOPICAL at 11:34

## 2021-09-13 RX ADMIN — PROPRANOLOL HYDROCHLORIDE 20 MG: 20 TABLET ORAL at 05:17

## 2021-09-13 RX ADMIN — OXYCODONE HYDROCHLORIDE AND ACETAMINOPHEN 1 TABLET: 5; 325 TABLET ORAL at 06:07

## 2021-09-13 RX ADMIN — ACETAMINOPHEN 650 MG: 325 TABLET, FILM COATED ORAL at 15:32

## 2021-09-13 RX ADMIN — ENOXAPARIN SODIUM 40 MG: 40 INJECTION SUBCUTANEOUS at 08:42

## 2021-09-13 RX ADMIN — LORAZEPAM 0.5 MG: 0.5 TABLET ORAL at 06:27

## 2021-09-13 RX ADMIN — OXYCODONE HYDROCHLORIDE AND ACETAMINOPHEN 500 MG: 500 TABLET ORAL at 17:48

## 2021-09-13 RX ADMIN — ATORVASTATIN CALCIUM 40 MG: 40 TABLET, FILM COATED ORAL at 15:32

## 2021-09-13 RX ADMIN — LITHIUM CARBONATE 300 MG: 300 TABLET, FILM COATED, EXTENDED RELEASE ORAL at 21:11

## 2021-09-13 RX ADMIN — DICLOFENAC SODIUM 2 G: 10 GEL TOPICAL at 08:44

## 2021-09-13 RX ADMIN — DICLOFENAC SODIUM 2 G: 10 GEL TOPICAL at 21:13

## 2021-09-13 RX ADMIN — ASPIRIN 81 MG CHEWABLE TABLET 81 MG: 81 TABLET CHEWABLE at 08:42

## 2021-09-13 RX ADMIN — VALBENAZINE 80 MG: 80 CAPSULE ORAL at 08:44

## 2021-09-13 RX ADMIN — OXYCODONE HYDROCHLORIDE AND ACETAMINOPHEN 1 TABLET: 5; 325 TABLET ORAL at 18:18

## 2021-09-13 RX ADMIN — PREGABALIN 100 MG: 100 CAPSULE ORAL at 15:32

## 2021-09-13 RX ADMIN — AMLODIPINE BESYLATE 5 MG: 5 TABLET ORAL at 08:42

## 2021-09-13 RX ADMIN — OXYCODONE HYDROCHLORIDE AND ACETAMINOPHEN 1 TABLET: 5; 325 TABLET ORAL at 12:13

## 2021-09-13 RX ADMIN — ACETAMINOPHEN 650 MG: 325 TABLET, FILM COATED ORAL at 21:12

## 2021-09-13 RX ADMIN — QUETIAPINE FUMARATE 50 MG: 25 TABLET ORAL at 21:10

## 2021-09-13 RX ADMIN — BUSPIRONE HYDROCHLORIDE 15 MG: 10 TABLET ORAL at 21:10

## 2021-09-13 RX ADMIN — FOLIC ACID 1000 MCG: 1 TABLET ORAL at 08:42

## 2021-09-13 RX ADMIN — QUETIAPINE FUMARATE 50 MG: 25 TABLET ORAL at 17:48

## 2021-09-13 NOTE — PLAN OF CARE
Problem: Potential for Falls  Goal: Patient will remain free of falls  Description: INTERVENTIONS:  - Educate patient/family on patient safety including physical limitations  - Instruct patient to call for assistance with activity   - Consult OT/PT to assist with strengthening/mobility   - Keep Call bell within reach  - Keep bed low and locked with side rails adjusted as appropriate  - Keep care items and personal belongings within reach  - Initiate and maintain comfort rounds  - Make Fall Risk Sign visible to staff  - Offer Toileting every 2 Hours, in advance of need  - Initiate/Maintain bed/chair alarm  - Obtain necessary fall risk management equipment: nonskid socks  - Apply yellow socks and bracelet for high fall risk patients  - Consider moving patient to room near nurses station  Outcome: Progressing     Problem: MOBILITY - ADULT  Goal: Maintain or return to baseline ADL function  Description: INTERVENTIONS:  -  Assess patient's ability to carry out ADLs; assess patient's baseline for ADL function and identify physical deficits which impact ability to perform ADLs (bathing, care of mouth/teeth, toileting, grooming, dressing, etc )  - Assess/evaluate cause of self-care deficits   - Assess range of motion  - Assess patient's mobility; develop plan if impaired  - Assess patient's need for assistive devices and provide as appropriate  - Encourage maximum independence but intervene and supervise when necessary  - Involve family in performance of ADLs  - Assess for home care needs following discharge   - Consider OT consult to assist with ADL evaluation and planning for discharge  - Provide patient education as appropriate  Outcome: Progressing  Goal: Maintains/Returns to pre admission functional level  Description: INTERVENTIONS:  - Perform BMAT or MOVE assessment daily    - Set and communicate daily mobility goal to care team and patient/family/caregiver     - Collaborate with rehabilitation services on mobility goals if consulted  - Perform Range of Motion 3 times a day  - Reposition patient every 2 hours    - Dangle patient 3 times a day  - Stand patient 3 times a day  - Ambulate patient 3 times a day  - Out of bed to chair 3 times a day   - Out of bed for meals 3 times a day  - Out of bed for toileting  - Record patient progress and toleration of activity level   Outcome: Progressing     Problem: Prexisting or High Potential for Compromised Skin Integrity  Goal: Skin integrity is maintained or improved  Description: INTERVENTIONS:  - Identify patients at risk for skin breakdown  - Assess and monitor skin integrity  - Assess and monitor nutrition and hydration status  - Monitor labs   - Assess for incontinence   - Turn and reposition patient  - Assist with mobility/ambulation  - Relieve pressure over bony prominences  - Avoid friction and shearing  - Provide appropriate hygiene as needed including keeping skin clean and dry  - Evaluate need for skin moisturizer/barrier cream  - Collaborate with interdisciplinary team   - Patient/family teaching  - Consider wound care consult   Outcome: Progressing

## 2021-09-13 NOTE — PROGRESS NOTES
INTERNAL MEDICINE RESIDENCY PROGRESS NOTE     Name: Abigail Hsu   Age & Sex: 60 y o  female   MRN: [id number]  Unit/Bed#: [facility] 908-01   Encounter: [id number]  Team: SOD Team A    PATIENT INFORMATION     Name: Abigail Hsu   Age & Sex: 60 y o  female   MRN: [id number]  Hospital Stay Days: 24    ASSESSMENT/PLAN     Principal Problem:    Ambulatory dysfunction  Active Problems:    Chronic pain syndrome    Esophageal reflux    Generalized anxiety disorder    Tardive dyskinesia    History of CVA (cerebrovascular accident)    Mixed hyperlipidemia      Mixed hyperlipidemia  Assessment & Plan  -On Atorvastatin 40 mg PO qd    History of CVA (cerebrovascular accident)  Assessment & Plan  -On Aspirin 81 mg PO qd  -On Atorvastatin 40 mg PO qd    Tardive dyskinesia  Assessment & Plan  -Continue Valbenazine 80 mg daily  -appears to have slightly improved with addition of Lyrica 100 mg t i d     Generalized anxiety disorder  Assessment & Plan  Patient is on multiple psychiatric medications and was recently evaluated by inpatient psych at [facility] with adjustments to her medications including buspirone 50 mg t i d , paroxetine 60 mg daily, quetiapine 50 mg 4 times daily, mirtazapine 7 5 mg q h s , lithium 300 mg q h s  And hydroxyzine 50 mg t i d  P r n  Xenia Alexander Patient continues to have increased anxiety with daily morning panic attacks  Patient also reports that her anxiety is worsened by her lack of pain control  Plan:  · Continue medical management  · Continue current psychotropic medication   · She is psychiatrically cleared to go to SNF  · Patient could benefit from outpatient psychiatric evaluation and follow-up  · Confirmed psych medication regimen following discharge from [address] [place] [address] as noted above  Esophageal reflux  Assessment & Plan  Continue Protonix 20 mg daily  Chronic pain syndrome  Assessment & Plan  Patient has been on chronic opioids for low back and leg pain    She was seen in the clinic and discussed opioid tapering  Most recent opioid prescription per PDMP was for morphine 15 mg for 10 days (20 tablets)  Per chart review, concern for polypharmacy and suspicion of drug-seeking  · On Tylenol 650 mg q8h  · Lidocaine patch/Voltaren gel  · On Lyrica 100 mg  T i d    · Oxy IR 2 5 mg q6h prn  · Bengay topical ointment 4 times daily PRN  ·  Added Aqua K    ·  Added Robaxin  500 mg q 6h p r n     * Ambulatory dysfunction  Assessment & Plan  Patient with decreased physical mobility, at risk for falls  She uses walker and wheelchair at home  Presented to the ED requesting placement to SNF  · Encourage good body mechanics and assist with transfers  · Keep personal items and call bell close to the patient  · PT and OT- recommends post acute rehab  · Pending placement  Cough-resolved as of 2021  Assessment & Plan  Patient complains of progressive worsening cough from  to   Cough is nonproductive  Patient also reports associated chills, nasal congestion, and nausea  -COVID PCR negative  -chest x-ray unremarkable  -Robitussin for cough  -continue to trend white count and fevers    -resolved as of         Disposition: Patient is medically stable and on her psychotrophic medication regimen  Patient is awaiting placement  We are working with case management on placement for this patient  SUBJECTIVE     Patient seen and examined  No acute events overnight  Patient has mild anxiety that is being maanged with current medication therapy      OBJECTIVE     Vitals:    21 1219 21 1605 21 0009 21 0732   BP: 119/85 124/75 119/68 132/88   Pulse: 87  78 68   Resp:  16 20 20   Temp:  98 3 °F (36 8 °C) 97 7 °F (36 5 °C) 97 9 °F (36 6 °C)   TempSrc:       SpO2:  99% 98% 96%   Weight:       Height:          Temperature:   Temp (24hrs), Av °F (36 7 °C), Min:97 7 °F (36 5 °C), Max:98 3 °F (36 8 °C)    Temperature: 97 9 °F (36 6 °C)  Intake & Output:  I/O       09/11 0701 - 09/12 0700 09/12 0701 - 09/13 0700 09/13 0701 - 09/14 0700    P  O  700 340     Total Intake(mL/kg) 700 (7 7) 340 (3 7)     Urine (mL/kg/hr)  0 (0)     Total Output  0     Net +700 +340            Unmeasured Urine Occurrence 2 x 2 x     Unmeasured Stool Occurrence 2 x          Weights:   IBW (Ideal Body Weight): 47 8 kg    Body mass index is 37 99 kg/m²  Weight (last 2 days)     None        Physical Exam  HENT:      Head: Normocephalic and atraumatic  Eyes:      Conjunctiva/sclera: Conjunctivae normal    Cardiovascular:      Rate and Rhythm: Normal rate and regular rhythm  Pulses: Normal pulses  Heart sounds: Normal heart sounds  Pulmonary:      Effort: Pulmonary effort is normal       Breath sounds: Normal breath sounds  Abdominal:      General: Bowel sounds are normal       Palpations: Abdomen is soft  Skin:     General: Skin is warm and dry  Capillary Refill: Capillary refill takes less than 2 seconds  Neurological:      Mental Status: She is alert and oriented to person, place, and time  Mental status is at baseline  LABORATORY DATA     Labs: I have personally reviewed pertinent reports  Results from last 7 days   Lab Units 09/13/21  0615   WBC Thousand/uL 4 03*   HEMOGLOBIN g/dL 13 4   HEMATOCRIT % 42 0   PLATELETS Thousands/uL 277   NEUTROS PCT % 60   MONOS PCT % 9          Invalid input(s): LABALBU                         IMAGING & DIAGNOSTIC TESTING     Radiology Results: I have personally reviewed pertinent reports  No results found  Other Diagnostic Testing: I have personally reviewed pertinent reports      ACTIVE MEDICATIONS     Current Facility-Administered Medications   Medication Dose Route Frequency    acetaminophen (TYLENOL) tablet 650 mg  650 mg Oral Q8H [address]    amLODIPine (NORVASC) tablet 5 mg  5 mg Oral Daily    ascorbic acid (VITAMIN C) tablet 500 mg  500 mg Oral BID    aspirin chewable tablet 81 mg  81 mg Oral Daily    atorvastatin (LIPITOR) tablet 40 mg  40 mg Oral Daily With Dinner    busPIRone (BUSPAR) tablet 15 mg  15 mg Oral TID    dextromethorphan-guaiFENesin (ROBITUSSIN DM) oral syrup 10 mL  10 mL Oral Q4H PRN    Diclofenac Sodium (VOLTAREN) 1 % topical gel 2 g  2 g Topical 4x Daily    enoxaparin (LOVENOX) subcutaneous injection 40 mg  40 mg Subcutaneous Daily    ferrous sulfate tablet 325 mg  325 mg Oral Daily With Breakfast    folic acid (FOLVITE) tablet 1,000 mcg  1,000 mcg Oral Daily    hydrOXYzine HCL (ATARAX) tablet 50 mg  50 mg Oral TID PRN    lidocaine (LIDODERM) 5 % patch 1 patch  1 patch Topical Daily    lidocaine (LIDODERM) 5 % patch 1 patch  1 patch Topical Daily    lithium carbonate (LITHOBID) CR tablet 300 mg  300 mg Oral HS    LORazepam (ATIVAN) tablet 0 5 mg  0 5 mg Oral Q8H PRN    menthol-methyl salicylate (BENGAY) 72-51 % cream   Apply externally 4x Daily PRN    methocarbamol (ROBAXIN) tablet 500 mg  500 mg Oral Q6H PRN    mirtazapine (REMERON) tablet 7 5 mg  7 5 mg Oral HS PRN    ondansetron (ZOFRAN-ODT) dispersible tablet 4 mg  4 mg Oral Q6H PRN    oxyCODONE-acetaminophen (PERCOCET) 5-325 mg per tablet 1 tablet  1 tablet Oral Q6H PRN    pantoprazole (PROTONIX) EC tablet 20 mg  20 mg Oral Early Morning    PARoxetine (PAXIL) tablet 60 mg  60 mg Oral Daily    polyethylene glycol (MIRALAX) packet 17 g  17 g Oral Daily    prazosin (MINIPRESS) capsule 2 mg  2 mg Oral Daily    pregabalin (LYRICA) capsule 100 mg  100 mg Oral TID    propranolol (INDERAL) tablet 20 mg  20 mg Oral BID before breakfast/lunch    QUEtiapine (SEROquel) tablet 50 mg  50 mg Oral 4x Daily (PC & HS)    senna-docusate sodium (SENOKOT S) 8 6-50 mg per tablet 1 tablet  1 tablet Oral Daily PRN    Valbenazine Tosylate CAPS 80 mg  80 mg Oral Daily       VTE Pharmacologic Prophylaxis: Enoxaparin (Lovenox)  VTE Mechanical Prophylaxis: sequential compression device    Portions of the record may have been created with voice recognition software  Occasional wrong word or "sound a like" substitutions may have occurred due to the inherent limitations of voice recognition software    Read the chart carefully and recognize, using context, where substitutions have occurred   ==  Katja Rodriguez MD  [address]  Internal Medicine Residency PGY-1

## 2021-09-14 PROCEDURE — 97110 THERAPEUTIC EXERCISES: CPT

## 2021-09-14 PROCEDURE — 99232 SBSQ HOSP IP/OBS MODERATE 35: CPT | Performed by: HOSPITALIST

## 2021-09-14 PROCEDURE — 97164 PT RE-EVAL EST PLAN CARE: CPT

## 2021-09-14 PROCEDURE — 97168 OT RE-EVAL EST PLAN CARE: CPT

## 2021-09-14 RX ADMIN — AMLODIPINE BESYLATE 5 MG: 5 TABLET ORAL at 08:56

## 2021-09-14 RX ADMIN — PREGABALIN 100 MG: 100 CAPSULE ORAL at 08:55

## 2021-09-14 RX ADMIN — DICLOFENAC SODIUM 2 G: 10 GEL TOPICAL at 09:07

## 2021-09-14 RX ADMIN — OXYCODONE HYDROCHLORIDE AND ACETAMINOPHEN 1 TABLET: 5; 325 TABLET ORAL at 06:40

## 2021-09-14 RX ADMIN — PRAZOSIN HYDROCHLORIDE 2 MG: 2 CAPSULE ORAL at 08:58

## 2021-09-14 RX ADMIN — VALBENAZINE 80 MG: 80 CAPSULE ORAL at 09:00

## 2021-09-14 RX ADMIN — BUSPIRONE HYDROCHLORIDE 15 MG: 10 TABLET ORAL at 21:42

## 2021-09-14 RX ADMIN — ATORVASTATIN CALCIUM 40 MG: 40 TABLET, FILM COATED ORAL at 15:55

## 2021-09-14 RX ADMIN — PROPRANOLOL HYDROCHLORIDE 20 MG: 20 TABLET ORAL at 06:12

## 2021-09-14 RX ADMIN — FERROUS SULFATE TAB 325 MG (65 MG ELEMENTAL FE) 325 MG: 325 (65 FE) TAB at 08:56

## 2021-09-14 RX ADMIN — OXYCODONE HYDROCHLORIDE AND ACETAMINOPHEN 500 MG: 500 TABLET ORAL at 08:57

## 2021-09-14 RX ADMIN — QUETIAPINE FUMARATE 50 MG: 25 TABLET ORAL at 13:21

## 2021-09-14 RX ADMIN — LORAZEPAM 0.5 MG: 0.5 TABLET ORAL at 13:20

## 2021-09-14 RX ADMIN — LITHIUM CARBONATE 300 MG: 300 TABLET, FILM COATED, EXTENDED RELEASE ORAL at 21:42

## 2021-09-14 RX ADMIN — PREGABALIN 100 MG: 100 CAPSULE ORAL at 15:56

## 2021-09-14 RX ADMIN — BUSPIRONE HYDROCHLORIDE 15 MG: 10 TABLET ORAL at 09:00

## 2021-09-14 RX ADMIN — QUETIAPINE FUMARATE 50 MG: 25 TABLET ORAL at 17:17

## 2021-09-14 RX ADMIN — ENOXAPARIN SODIUM 40 MG: 40 INJECTION SUBCUTANEOUS at 08:57

## 2021-09-14 RX ADMIN — ASPIRIN 81 MG CHEWABLE TABLET 81 MG: 81 TABLET CHEWABLE at 08:56

## 2021-09-14 RX ADMIN — ACETAMINOPHEN 650 MG: 325 TABLET, FILM COATED ORAL at 21:42

## 2021-09-14 RX ADMIN — BUSPIRONE HYDROCHLORIDE 15 MG: 10 TABLET ORAL at 15:55

## 2021-09-14 RX ADMIN — PREGABALIN 100 MG: 100 CAPSULE ORAL at 21:42

## 2021-09-14 RX ADMIN — OXYCODONE HYDROCHLORIDE AND ACETAMINOPHEN 1 TABLET: 5; 325 TABLET ORAL at 21:46

## 2021-09-14 RX ADMIN — QUETIAPINE FUMARATE 50 MG: 25 TABLET ORAL at 21:43

## 2021-09-14 RX ADMIN — OXYCODONE HYDROCHLORIDE AND ACETAMINOPHEN 500 MG: 500 TABLET ORAL at 17:17

## 2021-09-14 RX ADMIN — OXYCODONE HYDROCHLORIDE AND ACETAMINOPHEN 1 TABLET: 5; 325 TABLET ORAL at 15:56

## 2021-09-14 RX ADMIN — PAROXETINE HYDROCHLORIDE 60 MG: 20 TABLET, FILM COATED ORAL at 21:41

## 2021-09-14 RX ADMIN — LORAZEPAM 0.5 MG: 0.5 TABLET ORAL at 21:43

## 2021-09-14 RX ADMIN — PROPRANOLOL HYDROCHLORIDE 20 MG: 20 TABLET ORAL at 13:23

## 2021-09-14 RX ADMIN — OXYCODONE HYDROCHLORIDE AND ACETAMINOPHEN 1 TABLET: 5; 325 TABLET ORAL at 00:53

## 2021-09-14 RX ADMIN — FOLIC ACID 1000 MCG: 1 TABLET ORAL at 08:57

## 2021-09-14 RX ADMIN — HYDROXYZINE HYDROCHLORIDE 50 MG: 25 TABLET, FILM COATED ORAL at 09:48

## 2021-09-14 RX ADMIN — PANTOPRAZOLE SODIUM 20 MG: 20 TABLET, DELAYED RELEASE ORAL at 06:11

## 2021-09-14 RX ADMIN — QUETIAPINE FUMARATE 50 MG: 25 TABLET ORAL at 08:59

## 2021-09-14 RX ADMIN — ACETAMINOPHEN 650 MG: 325 TABLET, FILM COATED ORAL at 06:11

## 2021-09-14 NOTE — CASE MANAGEMENT
Leilani Wong is requesting peer to peer  MD is available until 4:30 today or before 11AM tomorrow  [phone], option 5  Case # [id number]    Dr Jyotsna Schreiber notified

## 2021-09-14 NOTE — PHYSICAL THERAPY NOTE
Physical Therapy Re- Evaluation     Patient's Name: Samantha Pearson    Admitting Diagnosis  Anxiety [F41 9]  Leg pain [W63 543]  Anxiety attack [F41 0]  Chronic leg pain [D03 672, A85 47]    Problem List  Patient Active Problem List   Diagnosis    Right knee pain    Chronic pain syndrome    Lumbar radiculopathy    Lumbar spondylosis    Fibromyalgia    Lumbar disc disease with radiculopathy    Spinal stenosis of lumbar region with neurogenic claudication    Pain in joint, shoulder region    Intractable low back pain    Bilateral hip pain    Bipolar II disorder ([facility] [address] [facility]    Cognitive disorder    Esophageal reflux    Fatigue    Female pelvic pain    Generalized anxiety disorder    Essential hypertension    History of hypokalemia    Insomnia    Major depressive disorder, recurrent episode, moderate degree (HCC)    Migraine    Opioid dependence (HCC)    Osteoarthritis of both hips    Osteoarthritis of knee    Overactive bladder    Left hip pain    Panic disorder without agoraphobia    Post traumatic stress disorder    Primary localized osteoarthritis of both knees    Recent unexplained weight loss    Sacroiliitis (HCC)    Tremor    Urinary incontinence    Vitamin D deficiency    Post laminectomy syndrome    Encounter for long-term use of opiate analgesic    Family history of colorectal cancer    Morbid obesity with BMI of 40 0-44 9, adult ([facility] [address] )    Complaint related to dreams    MIMI (obstructive sleep apnea)    Tardive dyskinesia    Primary osteoarthritis of both knees    Patellofemoral disorder of both knees    Rash    Superficial bacterial infection of skin    Scar of back    Impaired skin integrity associated with surgical incision    Status post insertion of spinal cord stimulator    Ambulatory dysfunction    Dysphagia    Arthritis of right knee    Multiple closed fractures of metatarsal bone of right foot    Chronic back pain    Nausea    Encephalopathy    [facility]B MAIN OR;  Service: Neurosurgery    TONSILLECTOMY      TUBAL LIGATION  1986    UPPER GASTROINTESTINAL ENDOSCOPY  09/2020     Re- eval time: 10:04 - 10:18      09/14/21 1018   PT Last Visit   PT Visit Date 09/14/21   Note Type   Note type Re-Evaluation   Pain Assessment   Pain Assessment Tool Pain Assessment not indicated - pt denies pain   Pain Score No Pain   Home Living   Type of Home Apartment   Home Layout One level;Performs ADLs on one level; Able to live on main level with bedroom/bathroom;Stairs to enter with rails   Bathroom Shower/Tub Tub/shower unit   [place]; Wheelchair-manual  (rollator )   Additional Comments Please also refer to IE for information  regarding home living/ PLOF    Prior Function   Level of [place] Independent with ADLs and functional mobility; Needs assistance with IADLs   Lives With Son   Receives Help From Family   ADL Assistance Independent   IADLs Needs assistance   Falls in the last 6 months 1 to 4   Vocational On disability   Comments Please also refer to IE for information  regarding home living/ PLOF    Restrictions/Precautions   Weight Bearing Precautions Per Order No   Other Precautions Fall Risk   General   Family/Caregiver Present No   Cognition   Arousal/Participation Alert   Attention Attends with cues to redirect   Orientation Level Oriented X4   Memory Decreased short term memory   Following Commands Follows one step commands with increased time or repetition   Comments Pt pleasant and cooperative to participate in therapy session    Strength RLE   RLE Overall Strength 4-/5   Strength LLE   LLE Overall Strength 4-/5   Bed Mobility   Supine to Sit 5  Supervision   Additional items Assist x 1; Increased time required; Bedrails   Sit to Supine 4  Minimal assistance   Additional items Assist x 1; Increased time required;LE management   Additional Comments Pt supine in bed upon arrival  Pt returned to supine in bed with all needs within reach at the end of therapy session  Transfers   Sit to Stand 5  Supervision   Additional items Assist x 1; Increased time required;Verbal cues   Stand to Sit 5  Supervision   Additional items Assist x 1; Increased time required;Verbal cues   Toilet transfer 5  Supervision   Additional items Assist x 1; Increased time required;Standard toilet  (grabbars)   Additional Comments Transfers with RW; pt performed 5x STS throughout therapy session  Pt required cues for upright posture; hip/ knee extension    Ambulation/Elevation   Gait pattern Excessively slow; Short stride; Foward flexed; Step to;Decreased foot clearance; Improper Weight shift   Gait Assistance 5  Supervision   Additional items Assist x 1   Assistive Device Rolling walker   Distance 15ft x 5 trials  (seated rest break in between each gait trial )   Stair Management Assistance Not tested  (deferred 2* increased fatigue )   Balance   Static Sitting Fair +   Dynamic Sitting Fair   Static Standing Fair   Dynamic Standing Fair   Ambulatory Fair   Endurance Deficit   Endurance Deficit Yes   Endurance Deficit Description fatigue, pain    Activity Tolerance   Activity Tolerance Patient tolerated treatment well;Patient limited by fatigue   Nurse Made Aware RN cleared pt to participate in therapy session    Assessment   Prognosis Fair   Problem List Decreased endurance;Decreased mobility;Pain; Impaired balance   Assessment Pt seen for PT rre-evaluation this date 2* expiring goals  Pt with active PT eval/treat orders  Pt is a 60 y o  female who was admitted to [facility] on 8/17/2021 with ambulatory dysfunciton   Pt  has a past medical history of Acid reflux, Anxiety, Arthritis, Bipolar 2 disorder ([address] ), Depression, Familial tremor, Fibromyalgia, Hearing aid worn, [place] (hard of hearing), Hypertension, Left-sided weakness, Lower back pain, Memory loss of unknown cause, Migraine, Obesity, Obesity, Class II, BMI 35-39 9, Overactive bladder, Panic attack, Post traumatic stress disorder, Seasonal allergies, Stroke [facility]), Thrombosis of cerebral arteries, Urinary incontinence, Wears dentures, and Wears glasses  Pt resides with son in 1 level apartment with [employer] and was independent using rollator prior to hospital admission  Pt has limitations in strength, balance, endurance, and overall functional mobility  Pt requires assist of 1 for all mobility performed this date  Pt performed bed mobility tasks at S level; increased A required for sit to supine bed mobility tasks this date compared to previous session  Pt c/o increased fatigue this date, only able to ambulate ~15ft x 5 trials with short seated rest breaks in between gait trails  Pt limited by increased fatigue and pain in low back and b/l LEs  Pt educated on energy conservation strategies with verbal understanding  Pt was left supine in bed at the end of PT session with all needs in reach  Pt would benefit from continued PT services while in hospital to address remaining limitations  PT to continue to follow pt and recommends home with HHPT + increased social support vs LTC; please also refer to OT cog assessment for additional d/c recommendations  The patient's AM-PAC Basic Mobility Inpatient Short Form Raw Score is 19, Standardized Score is 42 48  A standardized score less than 42 9 suggests the patient may benefit from discharge to post-acute rehabilitation services  Please also refer to the recommendation of the Physical Therapist for safe discharge planning  Barriers to Discharge Decreased caregiver support   Goals   Patient Goals none stated    STG Expiration Date 09/28/21   Short Term Goal #1 STG 1  Pt will be able to perform bed mobility with mod I in order to improve overall functional mobility and assist in safe d/c  STG 2  Pt will be able to perform functional transfer with mod I in order to improve overall functional mobility and assist in safe d/c  STG 3   Pt will be able to ambulate at least 150 feet with least restrictive device with mod I A in order to improve overall functional mobility and assist in safe d/c  STG 4  Pt will improve sitting/standing static/dynamic balance 1 grade in order to improve functional mobility and assist in safe d/c  STG 5  Pt will improve LE strength by one grade in order to improve functional mobility and assist in safe d/c  STG 6  Pt will be able to negotiate at least 7 steps at mod I level A in order to improve overall functional mobility and assist in safe d/c    PT Treatment Day 8   Plan   Treatment/Interventions Functional transfer training;LE strengthening/ROM; Therapeutic exercise; Endurance training;Patient/family training;Equipment eval/education; Bed mobility;Gait training;Spoke to nursing;Spoke to case management;OT   PT Frequency 2-3x/wk   Recommendation   PT Discharge Recommendation Home with home health rehabilitation  (+ increased social support vs LTC; also see OT recs )   Equipment Recommended [place] walker   PT - OK to Discharge Yes  (once medically cleared )   Additional Comments please also refer to OT cog assessment for further recommendations    AM-PAC Basic Mobility Inpatient   Turning in Bed Without Bedrails 4   Lying on Back to Sitting on Edge of Flat Bed 3   Moving Bed to Chair 3   Standing Up From Chair 3   Walk in Room 3   Climb 3-5 Stairs 3   Basic Mobility Inpatient Raw Score 19   Basic Mobility Standardized Score 42 48     Additional treatment time:  10:19- 10:29     S: pt agreeable to additional treatment session following re-eval    O: Therapeutic exercise   A: Pt performed/ tolerated seated b/l LE therex to hips, knees, and ankles without c/o increased pain/ discomfort  Pt performed 3 x10 of each of the following exercises: alternating marches, LAQs, toe raises, heel raise, hip abduction and hip adduction  Pt returned to supine in bed with all needs within reach at the end of therapy session     P  Continue to follow 2-3x/wk and recommend home with HHPT and increased social support vs LTC; please also refer to OT jose Hernandez Payment, PT, DPT

## 2021-09-14 NOTE — CASE MANAGEMENT
[address], [place], they are not accepting pt    Message via [facility] to Cassia Thompson and Kristin Sousa, they can accept pt  Request Tessie Coughlin but will accept pt if insurance denies    [facility] [place]-auth initiated, Case# [id number], clinical faxed to [phone]    Message in [facility] from [place], if [facility] denies, they will see if Medicaid [facility] will cover    Payor will need to be determined prior to admission

## 2021-09-14 NOTE — PROGRESS NOTES
INTERNAL MEDICINE RESIDENCY PROGRESS NOTE     Name: Elyssa Frost   Age & Sex: 60 y o  female   MRN: [id number]  Unit/Bed#: [facility] 908-01   Encounter: [id number]  Team: SOD Team A    PATIENT INFORMATION     Name: Elyssa Frost   Age & Sex: 60 y o  female   MRN: [id number]  Hospital Stay Days: 25    ASSESSMENT/PLAN     Principal Problem:    Ambulatory dysfunction  Active Problems:    Chronic pain syndrome    Esophageal reflux    Generalized anxiety disorder    Tardive dyskinesia    History of CVA (cerebrovascular accident)    Mixed hyperlipidemia      Mixed hyperlipidemia  Assessment & Plan  -On Atorvastatin 40 mg PO qd    History of CVA (cerebrovascular accident)  Assessment & Plan  -On Aspirin 81 mg PO qd  -On Atorvastatin 40 mg PO qd    Tardive dyskinesia  Assessment & Plan  -Continue Valbenazine 80 mg daily  -appears to have slightly improved with addition of Lyrica 100 mg t i d     Generalized anxiety disorder  Assessment & Plan  Patient is on multiple psychiatric medications and was recently evaluated by inpatient psych at [facility] with adjustments to her medications including buspirone 50 mg t i d , paroxetine 60 mg daily, quetiapine 50 mg 4 times daily, mirtazapine 7 5 mg q h s , lithium 300 mg q h s  And hydroxyzine 50 mg t i d  P r n  Georgette Ormond Patient continues to have increased anxiety with daily morning panic attacks  Patient also reports that her anxiety is worsened by her lack of pain control  Plan:  · Continue medical management  · Continue current psychotropic medication   · She is psychiatrically cleared to go to SNF  · Patient could benefit from outpatient psychiatric evaluation and follow-up  · Confirmed psych medication regimen following discharge from [address] [place] [address] as noted above  Esophageal reflux  Assessment & Plan  Continue Protonix 20 mg daily  Chronic pain syndrome  Assessment & Plan  Patient has been on chronic opioids for low back and leg pain    She was seen in the clinic and discussed opioid tapering  Most recent opioid prescription per PDMP was for morphine 15 mg for 10 days (20 tablets)  Per chart review, concern for polypharmacy and suspicion of drug-seeking  · On Tylenol 650 mg q8h  · Lidocaine patch/Voltaren gel  · On Lyrica 100 mg  T i d    · Oxy IR 2 5 mg q6h prn  · [place] topical ointment 4 times daily PRN  ·  Added Aqua K    ·  Added Robaxin  500 mg q 6h p r n     * Ambulatory dysfunction  Assessment & Plan  Patient with decreased physical mobility, at risk for falls  She uses walker and wheelchair at home  Presented to the ED requesting placement to SNF  · Encourage good body mechanics and assist with transfers  · Keep personal items and call bell close to the patient  · PT and OT- recommends post acute rehab  · Pending placement  Cough-resolved as of 2021  Assessment & Plan  Patient complains of progressive worsening cough from  to   Cough is nonproductive  Patient also reports associated chills, nasal congestion, and nausea  -COVID PCR negative  -chest x-ray unremarkable  -Robitussin for cough  -continue to trend white count and fevers    -resolved as of         Disposition: Patient is medically stable and on her psychotrophic medication regimen  Patient is awaiting placement  We are working with case management on placement for this patient  SUBJECTIVE     Patient seen and examined  No acute events overnight  Patient has mild anxiety that is being maanged with current medication therapy      OBJECTIVE     Vitals:    21 1531 21 0612 21 0817 21 1319   BP: 131/87 132/78 128/93 128/91   Pulse:   66 64   Resp: 16  17    Temp: 98 3 °F (36 8 °C)  98 2 °F (36 8 °C)    TempSrc:       SpO2:   96%    Weight:       Height:          Temperature:   Temp (24hrs), Av 3 °F (36 8 °C), Min:98 2 °F (36 8 °C), Max:98 3 °F (36 8 °C)    Temperature: 98 2 °F (36 8 °C)  Intake & Output:  I/O        6441 - 09/13 0700 09/13 0701 - 09/14 0700 09/14 0701 - 09/15 0700    P  O  340 360 280    Total Intake(mL/kg) 340 (3 7) 360 (3 9) 280 (3 1)    Urine (mL/kg/hr) 0 (0)      Total Output 0      Net +340 +360 +280           Unmeasured Urine Occurrence 2 x 1 x         Weights:   IBW (Ideal Body Weight): 47 8 kg    Body mass index is 37 99 kg/m²  Weight (last 2 days)     None        Physical Exam  HENT:      Head: Normocephalic and atraumatic  Eyes:      Conjunctiva/sclera: Conjunctivae normal    Cardiovascular:      Rate and Rhythm: Normal rate and regular rhythm  Pulses: Normal pulses  Heart sounds: Normal heart sounds  Pulmonary:      Effort: Pulmonary effort is normal       Breath sounds: Normal breath sounds  Abdominal:      General: Bowel sounds are normal       Palpations: Abdomen is soft  Skin:     General: Skin is warm and dry  Capillary Refill: Capillary refill takes less than 2 seconds  Neurological:      Mental Status: She is alert  LABORATORY DATA     Labs: I have personally reviewed pertinent reports  Results from last 7 days   Lab Units 09/13/21  0615   WBC Thousand/uL 4 03*   HEMOGLOBIN g/dL 13 4   HEMATOCRIT % 42 0   PLATELETS Thousands/uL 277   NEUTROS PCT % 60   MONOS PCT % 9          Invalid input(s): LABALBU                         IMAGING & DIAGNOSTIC TESTING     Radiology Results: I have personally reviewed pertinent reports  No results found  Other Diagnostic Testing: I have personally reviewed pertinent reports      ACTIVE MEDICATIONS     Current Facility-Administered Medications   Medication Dose Route Frequency    acetaminophen (TYLENOL) tablet 650 mg  650 mg Oral Q8H [facility]    amLODIPine (NORVASC) tablet 5 mg  5 mg Oral Daily    ascorbic acid (VITAMIN C) tablet 500 mg  500 mg Oral BID    aspirin chewable tablet 81 mg  81 mg Oral Daily    atorvastatin (LIPITOR) tablet 40 mg  40 mg Oral Daily With Dinner    busPIRone (BUSPAR) tablet 15 mg  15 mg Oral TID   [facility] dextromethorphan-guaiFENesin (ROBITUSSIN DM) oral syrup 10 mL  10 mL Oral Q4H PRN    Diclofenac Sodium (VOLTAREN) 1 % topical gel 2 g  2 g Topical 4x Daily    enoxaparin (LOVENOX) subcutaneous injection 40 mg  40 mg Subcutaneous Daily    ferrous sulfate tablet 325 mg  325 mg Oral Daily With Breakfast    folic acid (FOLVITE) tablet 1,000 mcg  1,000 mcg Oral Daily    hydrOXYzine HCL (ATARAX) tablet 50 mg  50 mg Oral TID PRN    lidocaine (LIDODERM) 5 % patch 1 patch  1 patch Topical Daily    lidocaine (LIDODERM) 5 % patch 1 patch  1 patch Topical Daily    lithium carbonate (LITHOBID) CR tablet 300 mg  300 mg Oral HS    LORazepam (ATIVAN) tablet 0 5 mg  0 5 mg Oral Q8H PRN    menthol-methyl salicylate (BENGAY) 88-35 % cream   Apply externally 4x Daily PRN    methocarbamol (ROBAXIN) tablet 500 mg  500 mg Oral Q6H PRN    mirtazapine (REMERON) tablet 7 5 mg  7 5 mg Oral HS PRN    ondansetron (ZOFRAN-ODT) dispersible tablet 4 mg  4 mg Oral Q6H PRN    oxyCODONE-acetaminophen (PERCOCET) 5-325 mg per tablet 1 tablet  1 tablet Oral Q6H PRN    pantoprazole (PROTONIX) EC tablet 20 mg  20 mg Oral Early Morning    PARoxetine (PAXIL) tablet 60 mg  60 mg Oral Daily    polyethylene glycol (MIRALAX) packet 17 g  17 g Oral Daily    prazosin (MINIPRESS) capsule 2 mg  2 mg Oral Daily    pregabalin (LYRICA) capsule 100 mg  100 mg Oral TID    propranolol (INDERAL) tablet 20 mg  20 mg Oral BID before breakfast/lunch    QUEtiapine (SEROquel) tablet 50 mg  50 mg Oral 4x Daily (PC & HS)    senna-docusate sodium (SENOKOT S) 8 6-50 mg per tablet 1 tablet  1 tablet Oral Daily PRN    Valbenazine Tosylate CAPS 80 mg  80 mg Oral Daily       VTE Pharmacologic Prophylaxis: Enoxaparin (Lovenox)  VTE Mechanical Prophylaxis: sequential compression device    Portions of the record may have been created with voice recognition software    Occasional wrong word or "sound a like" substitutions may have occurred due to the inherent limitations of voice recognition software    Read the chart carefully and recognize, using context, where substitutions have occurred   ==  Natividad Quevedo MD  [address]  Internal Medicine Residency PGY-1

## 2021-09-14 NOTE — PLAN OF CARE
Problem: PHYSICAL THERAPY ADULT  Goal: Performs mobility at highest level of function for planned discharge setting  See evaluation for individualized goals  Description: Treatment/Interventions: Functional transfer training, LE strengthening/ROM, Elevations, Therapeutic exercise, Endurance training, Patient/family training, Equipment eval/education, Bed mobility, Gait training, Spoke to nursing, OT  Equipment Recommended: Toma Krishnan       See flowsheet documentation for full assessment, interventions and recommendations  Outcome: Progressing  Note: Prognosis: Fair  Problem List: Decreased endurance, Decreased mobility, Pain, Impaired balance  Assessment: Pt seen for PT rre-evaluation this date 2* expiring goals  Pt with active PT eval/treat orders  Pt is a 60 y o  female who was admitted to [facility] on 8/17/2021 with ambulatory dysfunciton  Pt  has a past medical history of Acid reflux, Anxiety, Arthritis, Bipolar 2 disorder ([facility] [address] ), Depression, Familial tremor, Fibromyalgia, Hearing aid worn, [place] (hard of hearing), Hypertension, Left-sided weakness, Lower back pain, Memory loss of unknown cause, Migraine, Obesity, Obesity, Class II, BMI 35-39 9, Overactive bladder, Panic attack, Post traumatic stress disorder, Seasonal allergies, Stroke ([facility] [address] ), Thrombosis of cerebral arteries, Urinary incontinence, Wears dentures, and Wears glasses  Pt resides with son in 1 level apartment with [facility] and was independent using rollator prior to hospital admission  Pt has limitations in strength, balance, endurance, and overall functional mobility  Pt requires assist of 1 for all mobility performed this date  Pt performed bed mobility tasks at S level; increased A required for sit to supine bed mobility tasks this date compared to previous session  Pt c/o increased fatigue this date, only able to ambulate ~15ft x 5 trials with short seated rest breaks in between gait trails   Pt limited by increased fatigue and pain in low back and b/l LEs  Pt educated on energy conservation strategies with verbal understanding  Pt was left supine in bed at the end of PT session with all needs in reach  Pt would benefit from continued PT services while in hospital to address remaining limitations  PT to continue to follow pt and recommends home with HHPT + increased social support vs LTC; please also refer to OT cog assessment for additional d/c recommendations  The patient's AM-PAC Basic Mobility Inpatient Short Form Raw Score is 19, Standardized Score is 42 48  A standardized score less than 42 9 suggests the patient may benefit from discharge to post-acute rehabilitation services  Please also refer to the recommendation of the Physical Therapist for safe discharge planning  Barriers to Discharge: Decreased caregiver support       PT Discharge Recommendation: Home with home health rehabilitation (+ increased social support vs LTC; also see OT recs )     PT - OK to Discharge: Yes (once medically cleared )    See flowsheet documentation for full assessment

## 2021-09-14 NOTE — PLAN OF CARE
9/14/2021 1458 by Gil Prom  Outcome: Progressing  Note: Limitation: Decreased ADL status, Decreased endurance, Decreased cognition, Decreased self-care trans, Decreased high-level ADLs, Decreased Safe judgement during ADL  Prognosis: Good  Assessment: Pt is a 60 y o  female admitted to [facility] on 8/17/2021 with Ambulatory dysfunction  Pt  has a past medical history of Acid reflux, Anxiety, Arthritis, Bipolar 2 disorder ([facility] [address] ), Depression, Familial tremor, Fibromyalgia, Hearing aid worn, [place] (hard of hearing), Hypertension, Left-sided weakness, Lower back pain, Memory loss of unknown cause, Migraine, Obesity, Obesity, Class II, BMI 35-39 9, Overactive bladder, Panic attack, Post traumatic stress disorder, Seasonal allergies, Stroke ([facility] [address] ), Thrombosis of cerebral arteries, Urinary incontinence, Wears dentures, and Wears glasses  PTA, Pt needed assistance with ADLs, IADLs, and functional mobility (with use of rollator and manual wheelchair)  Pt lives at home with her son in a 1 story house with 2  SKY  Pt is currently Supervision with UB ADLs and functional mobility (with use of RW) and Min assist with LB ADLs  Pt able to complete UB bathing with setup of wash basin, washcloths, towels, and soap - Pt needed min assist for LB bathing (lower part of leg and foot)  Pt is demonstrating deficits including decreased ADL status, decreased cognition, decreased endurance, decreased safety awareness during ADL, decreased self-care trans and decreased high-level ADLs  Pt has limited social support at this time- son reports working during the day and is unable to care for Pt  Pt seen for OT eval on 9/2-Chris Cognitive Level Screen was performed  Pt scored 3 6 indicating 24/7 supervision is recommended   Pt would benefit from skilled OT services during hospital stay and home health with increased social support vs STR with possible need for LTC if family unable to provide increased care upon d/c      OT Discharge Recommendation: Home with home health rehabilitation (home with increased social support vs STR)  OT - OK to Discharge: Yes (when medically cleared)

## 2021-09-14 NOTE — OCCUPATIONAL THERAPY NOTE
Occupational Therapy Evaluation     Patient Name: Melinda Beal  EXHRV'W Date: 2021  Problem List  Principal Problem:    Ambulatory dysfunction  Active Problems:    Chronic pain syndrome    Esophageal reflux    Generalized anxiety disorder    Tardive dyskinesia    History of CVA (cerebrovascular accident)    Mixed hyperlipidemia    Past Medical History  Past Medical History:   Diagnosis Date    Acid reflux     Anxiety     RESOLVED: [id number]    Arthritis     Bipolar 2 disorder (HCC)     FOLLOWS WITH PSYCHIATRIST  CONTINUE LAMOTRIGINE; RESOLVED: [id number]    Depression     Familial tremor     both hands    Fibromyalgia     LAST ASSESSED: [id number]    Hearing aid worn     left ear    [place] (hard of hearing)     left ear    Hypertension     Left-sided weakness     Lower back pain     Memory loss of unknown cause     long and short term    Migraine     Obesity     Obesity, Class II, BMI 35-39 9     Overactive bladder     Panic attack     Post traumatic stress disorder     Seasonal allergies     Stroke [facility])     questionable stroke 2009    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    CONT ASA 81 MG DAILY; RESOLVED: [id number]    Urinary incontinence     Wears dentures     partial lower / full upper    Wears glasses      Past Surgical History  Past Surgical History:   Procedure Laterality Date    BACK SURGERY       SECTION      COLONOSCOPY      RESOLVED: [id number]    EAR SURGERY      EGD      HYSTERECTOMY  2004    MYRINGOTOMY W/ TUBES Left     NECK SURGERY  2019    LA CYSTOURETHROSCOPY N/A 2016    Procedure: CYSTOSCOPY, BOTOX INJECTION;  Surgeon: Monica Espinoza MD;  Location: [facility];  Service: Gynecology    LA IMPLANT SPINAL NEUROSTIM/ Right 2/10/2021    Procedure: REPLACEMENT IMPLANTABLE PULSE GENERATOR DORSAL SPINAL COLUMN STIMULATOR, RIGHT;  Surgeon: Bruce Mendoza MD;  Location:  MAIN OR;  Service: Neurosurgery    LA PERCUT IMPLNT NEUROELECT,EPIDURAL Right 7/28/2020    Procedure: INSERTION THORACIC DORSAL COLUMN SPINAL CORD STIMULATOR PERCUTANEOUS W IMPLANTABLE PULSE GENERATOR, RIGHT;  Surgeon: Adeola Sumner MD;  Location:  MAIN OR;  Service: Neurosurgery    0 [facility]    UPPER GASTROINTESTINAL ENDOSCOPY  09/2020 09/14/21 1115   OT Last Visit   OT Visit Date 09/14/21   Note Type   Note type Re-Evaluation   Restrictions/Precautions   Weight Bearing Precautions Per Order No   Other Precautions Fall Risk;Pain;Cognitive   Pain Assessment   Pain Assessment Tool 0-10   Pain Score No Pain   Home Living   Type of Home Apartment   Home Layout One level;Performs ADLs on one level; Able to live on main level with bedroom/bathroom;Stairs to enter with rails   Bathroom Shower/Tub Tub/shower unit   [place]; Wheelchair-manual;Other (Comment)  (utilized rollator PTA)   Prior Function   Level of [place] Needs assistance with ADLs and functional mobility; Needs assistance with IADLs   Lives With Son   Receives Help From Family   ADL Assistance Needs assistance   IADLs Needs assistance   Falls in the last 6 months 1 to 4  (Pt reports 1)   Vocational On disability   Comments Pt reports needing assistance with ADLs, IADLs, and functional mobility at baseline - lives with her son who works and is unable to take care of her   Lifestyle   Autonomy pta pt reports son assists w/ ADLs/IADLs   pt reports using rollator for functional mobility PTA   Reciprocal Relationships supportive son- reports son is going back to work shortly and she will be home alone   Service to Others not currentlying working   Intrinsic Gratification enjoys watching tv   Psychosocial   Psychosocial (WDL) WDL   Subjective   Subjective "I can't get comfortable in bed"   ADL   Where Assessed Chair   Eating Assistance 5  Supervision/Setup   Grooming Assistance 5  Supervision/Setup   UB Bathing Assistance 5  Supervision/Setup   UB Bathing Deficit Setup;Verbal cueing  (Pt able to complete UB bathing with setup)   LB Bathing Assistance 4  Minimal Assistance   LB Bathing Deficit Setup;Verbal cueing; Increased time to complete;Right lower leg including foot; Left lower leg including foot  (Pt able to complete LB bathing with Min assist)   UB Dressing Assistance 5  Supervision/Setup   UB Dressing Deficit Verbal cueing; Increased time to complete  (Pt able to don/doff gown for bathing)   LB Dressing Assistance 4  Minimal Assistance   LB Dressing Deficit Supervision/safety; Increased time to complete   Toileting Assistance  5  Supervision/Setup   Toileting Deficit Supervison/safety; Increased time to complete;Grab bar use   Bed Mobility   Supine to Sit 5  Supervision   Additional items Verbal cues; Increased time required   Additional Comments Pt supine in bed upon OT arrival, Pt left in chair with all needs met and call bell in reach   Transfers   Sit to Stand 5  Supervision   Additional items Assist x 1; Increased time required;Verbal cues   Stand to Sit 5  Supervision   Additional items Assist x 1; Increased time required;Verbal cues   Toilet transfer 5  Supervision   Additional items Assist x 1;Standard toilet; Increased time required   Additional Comments Pt utilized RW for functional transfers   Functional Mobility   Functional Mobility 5  Supervision   Additional Comments Pt utilized RW for functional mobility   Additional items Rolling walker   Balance   Static Sitting Fair   Dynamic Sitting Fair -   Static Standing Fair -   Dynamic Standing Poor +   Ambulatory Poor +   Activity Tolerance   Activity Tolerance Patient tolerated treatment well   Medical Staff Made Aware Madalyn Sifuentes   Nurse Made Aware nursing cleared session   RUE Assessment   RUE Assessment WFL   LUE Assessment   LUE Assessment WFL   Cognition   Arousal/Participation Alert; Responsive; Cooperative   Attention Attends with cues to redirect   Orientation Level Oriented X4   Memory Decreased recall of precautions;Decreased recall of recent events   Following Commands Follows one step commands with increased time or repetition   Comments Pt agreeable and cooperative with therapy-Pt thanked OT at end of session   Assessment   Limitation Decreased ADL status; Decreased endurance;Decreased cognition;Decreased self-care trans;Decreased high-level ADLs; Decreased Safe judgement during ADL   Prognosis Good   Assessment Pt is a 60 y o  female admitted to [facility] on [facility]/17/2021 with Ambulatory dysfunction  Pt  has a past medical history of Acid reflux, Anxiety, Arthritis, Bipolar 2 disorder ([address] ), Depression, Familial tremor, Fibromyalgia, Hearing aid worn, [place] (hard of hearing), Hypertension, Left-sided weakness, Lower back pain, Memory loss of unknown cause, Migraine, Obesity, Obesity, Class II, BMI 35-39 9, Overactive bladder, Panic attack, Post traumatic stress disorder, Seasonal allergies, Stroke ([address] ), Thrombosis of cerebral arteries, Urinary incontinence, Wears dentures, and Wears glasses  PTA, Pt needed assistance with ADLs, IADLs, and functional mobility (with use of rollator and manual wheelchair)  Pt lives at home with her son in a 1 story house with 2  SKY  Pt is currently Supervision with UB ADLs and functional mobility (with use of RW) and Min assist with LB ADLs  Pt able to complete UB bathing with setup of wash basin, washcloths, towels, and soap - Pt needed min assist for LB bathing (lower part of leg and foot)  Pt is demonstrating deficits including decreased ADL status, decreased cognition, decreased endurance, decreased safety awareness during ADL, decreased self-care trans and decreased high-level ADLs  Pt has limited social support at this time- son reports working during the day and is unable to care for Pt  Pt seen for OT eval on 9/2-Chris Cognitive Level Screen was performed  Pt scored 3 6 indicating 24/7 supervision is recommended   Pt would benefit from skilled OT services during hospital stay and home health with increased social support vs STR with possible need for LTC if family unable to provide increased care upon d/c  Goals   Patient Goals to go to LTC   LTG Time Frame 10-14   Long Term Goal #1 please see below   Plan   Treatment Interventions ADL retraining;Functional transfer training; Endurance training;Cognitive reorientation;Patient/family training;Equipment evaluation/education; Compensatory technique education; Energy conservation; Activityengagement   Goal Expiration Date 09/28/21   OT Frequency 3-5x/wk   Recommendation   OT Discharge Recommendation Home with home health rehabilitation  (home with increased social support vs STR)   OT - OK to Discharge Yes  (when medically cleared)   AM-PAC Daily Activity Inpatient   Lower Body Dressing 3   Bathing 3   Toileting 4   Upper Body Dressing 4   Grooming 4   Eating 4   Daily Activity Raw Score 22   Daily Activity Standardized Score (Calc for Raw Score >=11) 47  1   AM-PAC Applied Cognition Inpatient   Following a Speech/Presentation 3   Understanding Ordinary Conversation 4   Taking Medications 2   Remembering Where Things Are Placed or Put Away 3   Remembering List of 4-5 Errands 2   Taking Care of Complicated Tasks 2   Applied Cognition Raw Score 16   Applied Cognition Standardized Score 35 03       The patient's raw score on the AM-PAC Daily Activity inpatient short form is 22, standardized score is 47 1, greater than 39 4  Patients at this level are likely to benefit from discharge to home  Please refer to the recommendation of the Occupational Therapist for safe discharge planning  Goals to be met within 10-14 days:    1  Pt will be Mod independent with UB and LB ADLs with use of AD/DME prn     2  Pt will demonstrate Fair carryover of body mechanics, safety awareness, and energy conservation techniques during ADL/IADL tasks      3  Pt will demonstrate good activity tolerance for 20-25 min during OT session to increase independence in ADL/IADL tasks  4  Pt will increase static and dynamic standing balance to Fair during ADL/IADL activities for safety  5  Pt will engage in ongoing cognitive assessments to assist with safe d/c planning        JOEL Garcia

## 2021-09-15 PROCEDURE — 99232 SBSQ HOSP IP/OBS MODERATE 35: CPT | Performed by: HOSPITALIST

## 2021-09-15 RX ADMIN — ATORVASTATIN CALCIUM 40 MG: 40 TABLET, FILM COATED ORAL at 17:25

## 2021-09-15 RX ADMIN — DICLOFENAC SODIUM 2 G: 10 GEL TOPICAL at 23:31

## 2021-09-15 RX ADMIN — ACETAMINOPHEN 650 MG: 325 TABLET, FILM COATED ORAL at 05:26

## 2021-09-15 RX ADMIN — OXYCODONE HYDROCHLORIDE AND ACETAMINOPHEN 1 TABLET: 5; 325 TABLET ORAL at 23:29

## 2021-09-15 RX ADMIN — PREGABALIN 100 MG: 100 CAPSULE ORAL at 17:25

## 2021-09-15 RX ADMIN — AMLODIPINE BESYLATE 5 MG: 5 TABLET ORAL at 08:49

## 2021-09-15 RX ADMIN — HYDROXYZINE HYDROCHLORIDE 50 MG: 25 TABLET, FILM COATED ORAL at 11:41

## 2021-09-15 RX ADMIN — DICLOFENAC SODIUM 2 G: 10 GEL TOPICAL at 11:41

## 2021-09-15 RX ADMIN — PREGABALIN 100 MG: 100 CAPSULE ORAL at 23:29

## 2021-09-15 RX ADMIN — LITHIUM CARBONATE 300 MG: 300 TABLET, FILM COATED, EXTENDED RELEASE ORAL at 23:31

## 2021-09-15 RX ADMIN — OXYCODONE HYDROCHLORIDE AND ACETAMINOPHEN 1 TABLET: 5; 325 TABLET ORAL at 17:26

## 2021-09-15 RX ADMIN — HYDROXYZINE HYDROCHLORIDE 50 MG: 25 TABLET, FILM COATED ORAL at 23:29

## 2021-09-15 RX ADMIN — FOLIC ACID 1000 MCG: 1 TABLET ORAL at 08:49

## 2021-09-15 RX ADMIN — DICLOFENAC SODIUM 2 G: 10 GEL TOPICAL at 18:24

## 2021-09-15 RX ADMIN — PREGABALIN 100 MG: 100 CAPSULE ORAL at 08:49

## 2021-09-15 RX ADMIN — BUSPIRONE HYDROCHLORIDE 15 MG: 10 TABLET ORAL at 08:49

## 2021-09-15 RX ADMIN — QUETIAPINE FUMARATE 50 MG: 25 TABLET ORAL at 08:49

## 2021-09-15 RX ADMIN — VALBENAZINE 80 MG: 80 CAPSULE ORAL at 08:50

## 2021-09-15 RX ADMIN — OXYCODONE HYDROCHLORIDE AND ACETAMINOPHEN 500 MG: 500 TABLET ORAL at 17:25

## 2021-09-15 RX ADMIN — OXYCODONE HYDROCHLORIDE AND ACETAMINOPHEN 500 MG: 500 TABLET ORAL at 08:48

## 2021-09-15 RX ADMIN — QUETIAPINE FUMARATE 50 MG: 25 TABLET ORAL at 23:29

## 2021-09-15 RX ADMIN — PROPRANOLOL HYDROCHLORIDE 20 MG: 20 TABLET ORAL at 06:32

## 2021-09-15 RX ADMIN — ASPIRIN 81 MG CHEWABLE TABLET 81 MG: 81 TABLET CHEWABLE at 08:48

## 2021-09-15 RX ADMIN — OXYCODONE HYDROCHLORIDE AND ACETAMINOPHEN 1 TABLET: 5; 325 TABLET ORAL at 11:41

## 2021-09-15 RX ADMIN — OXYCODONE HYDROCHLORIDE AND ACETAMINOPHEN 1 TABLET: 5; 325 TABLET ORAL at 05:27

## 2021-09-15 RX ADMIN — LORAZEPAM 0.5 MG: 0.5 TABLET ORAL at 08:49

## 2021-09-15 RX ADMIN — PANTOPRAZOLE SODIUM 20 MG: 20 TABLET, DELAYED RELEASE ORAL at 05:27

## 2021-09-15 RX ADMIN — PAROXETINE HYDROCHLORIDE 60 MG: 20 TABLET, FILM COATED ORAL at 23:31

## 2021-09-15 RX ADMIN — DICLOFENAC SODIUM 2 G: 10 GEL TOPICAL at 08:51

## 2021-09-15 RX ADMIN — BUSPIRONE HYDROCHLORIDE 15 MG: 10 TABLET ORAL at 17:24

## 2021-09-15 RX ADMIN — LORAZEPAM 0.5 MG: 0.5 TABLET ORAL at 17:26

## 2021-09-15 RX ADMIN — FERROUS SULFATE TAB 325 MG (65 MG ELEMENTAL FE) 325 MG: 325 (65 FE) TAB at 06:32

## 2021-09-15 RX ADMIN — ENOXAPARIN SODIUM 40 MG: 40 INJECTION SUBCUTANEOUS at 08:48

## 2021-09-15 RX ADMIN — ACETAMINOPHEN 650 MG: 325 TABLET, FILM COATED ORAL at 13:26

## 2021-09-15 RX ADMIN — QUETIAPINE FUMARATE 50 MG: 25 TABLET ORAL at 17:24

## 2021-09-15 RX ADMIN — QUETIAPINE FUMARATE 50 MG: 25 TABLET ORAL at 11:41

## 2021-09-15 RX ADMIN — ACETAMINOPHEN 650 MG: 325 TABLET, FILM COATED ORAL at 23:28

## 2021-09-15 RX ADMIN — PRAZOSIN HYDROCHLORIDE 2 MG: 2 CAPSULE ORAL at 08:50

## 2021-09-15 RX ADMIN — BUSPIRONE HYDROCHLORIDE 15 MG: 10 TABLET ORAL at 23:28

## 2021-09-15 NOTE — CASE MANAGEMENT
TC from [facility] with [place] stating the doctor here needs to call for P2P by 1100 this am     Call [phone] option 5, Dr David Carpio  TC to Sohan CHAVARRIA & informed him

## 2021-09-15 NOTE — PROGRESS NOTES
Count includes the [facility] ICU RESPIRATORY NOTE        Date of Admission: 9/12/2021    Date of Intubation (most recent): 9/15/21    Reason for Mechanical Ventilation: SOB on bipap with full settings    Number of Days on Mechanical Ventilation: 1    Met Criteria for Spontaneous Breathing Trial: no    Reason for No Spontaneous Breathing Trial: condition guarded    Significant Events Today: continue full support    ABG Results:   Recent Labs   Lab 09/15/21  0828 09/15/21  0813 09/13/21 2042 09/13/21  0003   PH  --  7.30* 7.35  --    PCO2  --  51* 39  --    PO2  --  22* 92  --    HCO3  --  25 21  --    O2PER 2 100 100 100       Current Vent Settings: Ventilation Mode: CMV/AC  (Continuous Mandatory Ventilation/ Assist Control)  FiO2 (%): 100 %  Rate Set (breaths/minute): 14 breaths/min  Tidal Volume Set (mL): 300 mL  PEEP (cm H2O): 12 cmH2O  Oxygen Concentration (%): 100 %  Resp: 30      Skin Assessment: liquicell over nose, cheek and forhead    Plan: full support     Abdullahi Burnett, RT   Received a referral from HIRAM for Samantha who was interested in finding housing in the immediate area  Samantha said that she spoke to [employer] about getting help with rent and security for a apartment  Samantha did not turn in the application that she received for [employer]  It was explained to Samantha that she may also apply for [facility]  I will touch base with Samantha on Monday about assistance to fill out application that are necessary for housing  Samantha lives with her daughter who she explained may not want others in her home  If I can not make a home visit further arrangements will be made to reach out to her in the clinic or another public setting

## 2021-09-15 NOTE — PLAN OF CARE
Problem: Potential for Falls  Goal: Patient will remain free of falls  Description: INTERVENTIONS:  - Educate patient/family on patient safety including physical limitations  - Instruct patient to call for assistance with activity   - Consult OT/PT to assist with strengthening/mobility   - Keep Call bell within reach  - Keep bed low and locked with side rails adjusted as appropriate  - Keep care items and personal belongings within reach  - Initiate and maintain comfort rounds  - Make Fall Risk Sign visible to staff  - Offer Toileting every 3 Hours, in advance of need  - Initiate/Maintain 3alarm  - Obtain necessary fall risk management equipment: 3  - Apply yellow socks and bracelet for high fall risk patients  - Consider moving patient to room near nurses station  Outcome: Progressing     Problem: MOBILITY - ADULT  Goal: Maintain or return to baseline ADL function  Description: INTERVENTIONS:  -  Assess patient's ability to carry out ADLs; assess patient's baseline for ADL function and identify physical deficits which impact ability to perform ADLs (bathing, care of mouth/teeth, toileting, grooming, dressing, etc )  - Assess/evaluate cause of self-care deficits   - Assess range of motion  - Assess patient's mobility; develop plan if impaired  - Assess patient's need for assistive devices and provide as appropriate  - Encourage maximum independence but intervene and supervise when necessary  - Involve family in performance of ADLs  - Assess for home care needs following discharge   - Consider OT consult to assist with ADL evaluation and planning for discharge  - Provide patient education as appropriate  Outcome: Progressing  Goal: Maintains/Returns to pre admission functional level  Description: INTERVENTIONS:  - Perform BMAT or MOVE assessment daily    - Set and communicate daily mobility goal to care team and patient/family/caregiver     - Collaborate with rehabilitation services on mobility goals if consulted  - Perform Range of Motion 3 times a day  - Reposition patient every 3 hours    - Dangle patient 3 times a day  - Stand patient 3 times a day  - Ambulate patient 3 times a day  - Out of bed to chair 3 times a day   - Out of bed for meals 3 times a day  - Out of bed for toileting  - Record patient progress and toleration of activity level   Outcome: Progressing     Problem: Prexisting or High Potential for Compromised Skin Integrity  Goal: Skin integrity is maintained or improved  Description: INTERVENTIONS:  - Identify patients at risk for skin breakdown  - Assess and monitor skin integrity  - Assess and monitor nutrition and hydration status  - Monitor labs   - Assess for incontinence   - Turn and reposition patient  - Assist with mobility/ambulation  - Relieve pressure over bony prominences  - Avoid friction and shearing  - Provide appropriate hygiene as needed including keeping skin clean and dry  - Evaluate need for skin moisturizer/barrier cream  - Collaborate with interdisciplinary team   - Patient/family teaching  - Consider wound care consult   Outcome: Progressing

## 2021-09-15 NOTE — PROGRESS NOTES
INTERNAL MEDICINE RESIDENCY PROGRESS NOTE     Name: Ismael Fried   Age & Sex: 60 y o  female   MRN: [id number]  Unit/Bed#: [facility] 908-01   Encounter: [id number]  Team: SOD Team A    PATIENT INFORMATION     Name: Ismael Fried   Age & Sex: 60 y o  female   MRN: [id number]  Hospital Stay Days: 32    ASSESSMENT/PLAN     Principal Problem:    Ambulatory dysfunction  Active Problems:    Chronic pain syndrome    Esophageal reflux    Generalized anxiety disorder    Tardive dyskinesia    History of CVA (cerebrovascular accident)    Mixed hyperlipidemia      Mixed hyperlipidemia  Assessment & Plan  -On Atorvastatin 40 mg PO qd    History of CVA (cerebrovascular accident)  Assessment & Plan  -On Aspirin 81 mg PO qd  -On Atorvastatin 40 mg PO qd    Tardive dyskinesia  Assessment & Plan  -Continue Valbenazine 80 mg daily  -appears to have slightly improved with addition of Lyrica 100 mg t i d     Generalized anxiety disorder  Assessment & Plan  Patient is on multiple psychiatric medications and was recently evaluated by inpatient psych at [address] [place] [address] with adjustments to her medications including buspirone 50 mg t i d , paroxetine 60 mg daily, quetiapine 50 mg 4 times daily, mirtazapine 7 5 mg q h s , lithium 300 mg q h s  And hydroxyzine 50 mg t i d  P r n  Misti Lee Patient continues to have increased anxiety with daily morning panic attacks  Patient also reports that her anxiety is worsened by her lack of pain control  Plan:  · Continue medical management  · Continue current psychotropic medication   · She is psychiatrically cleared to go to SNF  · Patient could benefit from outpatient psychiatric evaluation and follow-up  · Confirmed psych medication regimen following discharge from [address] [place] [address] as noted above  Esophageal reflux  Assessment & Plan  Continue Protonix 20 mg daily  Chronic pain syndrome  Assessment & Plan  Patient has been on chronic opioids for low back and leg pain    She was seen in the clinic and discussed opioid tapering  Most recent opioid prescription per PDMP was for morphine 15 mg for 10 days (20 tablets)  Per chart review, concern for polypharmacy and suspicion of drug-seeking  · On Tylenol 650 mg q8h  · Lidocaine patch/Voltaren gel  · On Lyrica 100 mg  T i d    · Oxy IR 2 5 mg q6h prn  · Bengay topical ointment 4 times daily PRN  ·  Added Aqua K    ·  Added Robaxin  500 mg q 6h p r n     * Ambulatory dysfunction  Assessment & Plan  Patient with decreased physical mobility, at risk for falls  She uses walker and wheelchair at home  Presented to the ED requesting placement to SNF  · Encourage good body mechanics and assist with transfers  · Keep personal items and call bell close to the patient  · PT/OT recommendations- Home with home health rehabilitation  Cough-resolved as of 2021  Assessment & Plan  Patient complains of progressive worsening cough from  to   Cough is nonproductive  Patient also reports associated chills, nasal congestion, and nausea  -COVID PCR negative  -chest x-ray unremarkable  -Robitussin for cough  -continue to trend white count and fevers    -resolved as of       Disposition: Patient is medically stable and on her psychotrophic medication regimen  Case management has been actively working towards placement for this patient  As per CM, we have a peer to peer with a rehab facility, will follow up on this  SUBJECTIVE     Patient seen and examined  No acute events overnight  Patient has mild anxiety that is being managed with current medication therapy       OBJECTIVE     Vitals:    21 2201 09/15/21 0525 09/15/21 0632 09/15/21 0721   BP: 115/70 115/70  126/85   Pulse:   78    Resp: 18   18   Temp: 98 1 °F (36 7 °C)   98 1 °F (36 7 °C)   TempSrc:       SpO2:       Weight:       Height:          Temperature:   Temp (24hrs), Av 1 °F (36 7 °C), Min:98 1 °F (36 7 °C), Max:98 2 °F (36 8 °C)    Temperature: 98 1 °F (36 7 °C)  Intake & Output:  I/O       09/13 0701 - 09/14 0700 09/14 0701 - 09/15 0700 09/15 0701 - 09/16 0700    P  O  360 400     Total Intake(mL/kg) 360 (3 9) 400 (4 4)     Urine (mL/kg/hr)       Total Output       Net +360 +400            Unmeasured Urine Occurrence 1 x          Weights:   IBW (Ideal Body Weight): 47 8 kg    Body mass index is 37 99 kg/m²  Weight (last 2 days)     None        Physical Exam  HENT:      Head: Normocephalic and atraumatic  Eyes:      Conjunctiva/sclera: Conjunctivae normal    Cardiovascular:      Rate and Rhythm: Normal rate and regular rhythm  Pulses: Normal pulses  Heart sounds: Normal heart sounds  Pulmonary:      Effort: Pulmonary effort is normal       Breath sounds: Normal breath sounds  Abdominal:      General: Bowel sounds are normal       Palpations: Abdomen is soft  Skin:     General: Skin is warm and dry  Capillary Refill: Capillary refill takes less than 2 seconds  Neurological:      Mental Status: She is alert and oriented to person, place, and time  LABORATORY DATA     Labs: I have personally reviewed pertinent reports  Results from last 7 days   Lab Units 09/13/21  0615   WBC Thousand/uL 4 03*   HEMOGLOBIN g/dL 13 4   HEMATOCRIT % 42 0   PLATELETS Thousands/uL 277   NEUTROS PCT % 60   MONOS PCT % 9          Invalid input(s): LABALBU                         IMAGING & DIAGNOSTIC TESTING     Radiology Results: I have personally reviewed pertinent reports  No results found  Other Diagnostic Testing: I have personally reviewed pertinent reports      ACTIVE MEDICATIONS     Current Facility-Administered Medications   Medication Dose Route Frequency    acetaminophen (TYLENOL) tablet 650 mg  650 mg Oral Q8H [address]    amLODIPine (NORVASC) tablet 5 mg  5 mg Oral Daily    ascorbic acid (VITAMIN C) tablet 500 mg  500 mg Oral BID    aspirin chewable tablet 81 mg  81 mg Oral Daily    atorvastatin (LIPITOR) tablet 40 mg  40 mg Oral Daily With Dinner    busPIRone (BUSPAR) tablet 15 mg  15 mg Oral TID    dextromethorphan-guaiFENesin (ROBITUSSIN DM) oral syrup 10 mL  10 mL Oral Q4H PRN    Diclofenac Sodium (VOLTAREN) 1 % topical gel 2 g  2 g Topical 4x Daily    enoxaparin (LOVENOX) subcutaneous injection 40 mg  40 mg Subcutaneous Daily    ferrous sulfate tablet 325 mg  325 mg Oral Daily With Breakfast    folic acid (FOLVITE) tablet 1,000 mcg  1,000 mcg Oral Daily    hydrOXYzine HCL (ATARAX) tablet 50 mg  50 mg Oral TID PRN    lidocaine (LIDODERM) 5 % patch 1 patch  1 patch Topical Daily    lidocaine (LIDODERM) 5 % patch 1 patch  1 patch Topical Daily    lithium carbonate (LITHOBID) CR tablet 300 mg  300 mg Oral HS    LORazepam (ATIVAN) tablet 0 5 mg  0 5 mg Oral Q8H PRN    menthol-methyl salicylate (BENGAY) 49-93 % cream   Apply externally 4x Daily PRN    methocarbamol (ROBAXIN) tablet 500 mg  500 mg Oral Q6H PRN    mirtazapine (REMERON) tablet 7 5 mg  7 5 mg Oral HS PRN    ondansetron (ZOFRAN-ODT) dispersible tablet 4 mg  4 mg Oral Q6H PRN    oxyCODONE-acetaminophen (PERCOCET) 5-325 mg per tablet 1 tablet  1 tablet Oral Q6H PRN    pantoprazole (PROTONIX) EC tablet 20 mg  20 mg Oral Early Morning    PARoxetine (PAXIL) tablet 60 mg  60 mg Oral Daily    polyethylene glycol (MIRALAX) packet 17 g  17 g Oral Daily    prazosin (MINIPRESS) capsule 2 mg  2 mg Oral Daily    pregabalin (LYRICA) capsule 100 mg  100 mg Oral TID    propranolol (INDERAL) tablet 20 mg  20 mg Oral BID before breakfast/lunch    QUEtiapine (SEROquel) tablet 50 mg  50 mg Oral 4x Daily (PC & HS)    senna-docusate sodium (SENOKOT S) 8 6-50 mg per tablet 1 tablet  1 tablet Oral Daily PRN    Valbenazine Tosylate CAPS 80 mg  80 mg Oral Daily       VTE Pharmacologic Prophylaxis: Enoxaparin (Lovenox)  VTE Mechanical Prophylaxis: sequential compression device    Portions of the record may have been created with voice recognition software    Occasional wrong word or "sound a like" substitutions may have occurred due to the inherent limitations of voice recognition software    Read the chart carefully and recognize, using context, where substitutions have occurred   ==  Venessa Kruse MD  [address]  Internal Medicine Residency PGY-1

## 2021-09-15 NOTE — CASE MANAGEMENT
TC to [facility] admissions  They need updated progress notes  Uploaded into referral & re-sent  Their DON will review info

## 2021-09-15 NOTE — CASE MANAGEMENT
TC from Vikki at [facility] with [address] was denied for skilled level  Member can appeal at [phone]  TC to Caroline at [facility] admissions [phone] & updated on [facility] response  She states she was wrong & they do not accept [facility] & has a facility that will take pt & have them call CM  Update: Name of facility is [facility] in [place]  Referral placed

## 2021-09-16 PROCEDURE — 99232 SBSQ HOSP IP/OBS MODERATE 35: CPT | Performed by: HOSPITALIST

## 2021-09-16 RX ADMIN — ASPIRIN 81 MG CHEWABLE TABLET 81 MG: 81 TABLET CHEWABLE at 09:37

## 2021-09-16 RX ADMIN — QUETIAPINE FUMARATE 50 MG: 25 TABLET ORAL at 21:08

## 2021-09-16 RX ADMIN — ATORVASTATIN CALCIUM 40 MG: 40 TABLET, FILM COATED ORAL at 16:43

## 2021-09-16 RX ADMIN — PAROXETINE HYDROCHLORIDE 60 MG: 20 TABLET, FILM COATED ORAL at 20:52

## 2021-09-16 RX ADMIN — BUSPIRONE HYDROCHLORIDE 15 MG: 10 TABLET ORAL at 09:38

## 2021-09-16 RX ADMIN — OXYCODONE HYDROCHLORIDE AND ACETAMINOPHEN 1 TABLET: 5; 325 TABLET ORAL at 06:14

## 2021-09-16 RX ADMIN — PRAZOSIN HYDROCHLORIDE 2 MG: 2 CAPSULE ORAL at 09:39

## 2021-09-16 RX ADMIN — LITHIUM CARBONATE 300 MG: 300 TABLET, FILM COATED, EXTENDED RELEASE ORAL at 21:08

## 2021-09-16 RX ADMIN — OXYCODONE HYDROCHLORIDE AND ACETAMINOPHEN 1 TABLET: 5; 325 TABLET ORAL at 13:38

## 2021-09-16 RX ADMIN — PANTOPRAZOLE SODIUM 20 MG: 20 TABLET, DELAYED RELEASE ORAL at 06:13

## 2021-09-16 RX ADMIN — VALBENAZINE 80 MG: 80 CAPSULE ORAL at 10:51

## 2021-09-16 RX ADMIN — QUETIAPINE FUMARATE 50 MG: 25 TABLET ORAL at 13:37

## 2021-09-16 RX ADMIN — QUETIAPINE FUMARATE 50 MG: 25 TABLET ORAL at 09:38

## 2021-09-16 RX ADMIN — LORAZEPAM 0.5 MG: 0.5 TABLET ORAL at 16:43

## 2021-09-16 RX ADMIN — ENOXAPARIN SODIUM 40 MG: 40 INJECTION SUBCUTANEOUS at 09:37

## 2021-09-16 RX ADMIN — ACETAMINOPHEN 650 MG: 325 TABLET, FILM COATED ORAL at 13:37

## 2021-09-16 RX ADMIN — BUSPIRONE HYDROCHLORIDE 15 MG: 10 TABLET ORAL at 20:52

## 2021-09-16 RX ADMIN — BUSPIRONE HYDROCHLORIDE 15 MG: 10 TABLET ORAL at 16:43

## 2021-09-16 RX ADMIN — FOLIC ACID 1000 MCG: 1 TABLET ORAL at 09:37

## 2021-09-16 RX ADMIN — HYDROXYZINE HYDROCHLORIDE 50 MG: 25 TABLET, FILM COATED ORAL at 20:52

## 2021-09-16 RX ADMIN — AMLODIPINE BESYLATE 5 MG: 5 TABLET ORAL at 09:37

## 2021-09-16 RX ADMIN — PREGABALIN 100 MG: 100 CAPSULE ORAL at 09:37

## 2021-09-16 RX ADMIN — FERROUS SULFATE TAB 325 MG (65 MG ELEMENTAL FE) 325 MG: 325 (65 FE) TAB at 06:31

## 2021-09-16 RX ADMIN — METHOCARBAMOL 500 MG: 500 TABLET, FILM COATED ORAL at 10:51

## 2021-09-16 RX ADMIN — PROPRANOLOL HYDROCHLORIDE 20 MG: 20 TABLET ORAL at 06:15

## 2021-09-16 RX ADMIN — LORAZEPAM 0.5 MG: 0.5 TABLET ORAL at 06:13

## 2021-09-16 RX ADMIN — DICLOFENAC SODIUM 2 G: 10 GEL TOPICAL at 13:38

## 2021-09-16 RX ADMIN — ACETAMINOPHEN 650 MG: 325 TABLET, FILM COATED ORAL at 21:08

## 2021-09-16 RX ADMIN — MENTHOL, METHYL SALICYLATE 1 APPLICATION: 10; 15 CREAM TOPICAL at 10:51

## 2021-09-16 RX ADMIN — PREGABALIN 100 MG: 100 CAPSULE ORAL at 20:54

## 2021-09-16 RX ADMIN — OXYCODONE HYDROCHLORIDE AND ACETAMINOPHEN 500 MG: 500 TABLET ORAL at 09:37

## 2021-09-16 RX ADMIN — OXYCODONE HYDROCHLORIDE AND ACETAMINOPHEN 500 MG: 500 TABLET ORAL at 16:44

## 2021-09-16 RX ADMIN — DICLOFENAC SODIUM 2 G: 10 GEL TOPICAL at 09:39

## 2021-09-16 RX ADMIN — PROPRANOLOL HYDROCHLORIDE 20 MG: 20 TABLET ORAL at 10:52

## 2021-09-16 RX ADMIN — OXYCODONE HYDROCHLORIDE AND ACETAMINOPHEN 1 TABLET: 5; 325 TABLET ORAL at 20:53

## 2021-09-16 RX ADMIN — QUETIAPINE FUMARATE 50 MG: 25 TABLET ORAL at 16:43

## 2021-09-16 RX ADMIN — HYDROXYZINE HYDROCHLORIDE 50 MG: 25 TABLET, FILM COATED ORAL at 10:51

## 2021-09-16 RX ADMIN — PREGABALIN 100 MG: 100 CAPSULE ORAL at 16:43

## 2021-09-16 RX ADMIN — ACETAMINOPHEN 650 MG: 325 TABLET, FILM COATED ORAL at 06:14

## 2021-09-16 NOTE — CASE MANAGEMENT
HUNTER spoke with Carmine Prieto from [facility] to follow up on the status of the referral  Carmine Prieto requested updated clinicals  CM sent updated clinicals via [facility]

## 2021-09-16 NOTE — PLAN OF CARE
Problem: Potential for Falls  Goal: Patient will remain free of falls  Description: INTERVENTIONS:  - Educate patient/family on patient safety including physical limitations  - Instruct patient to call for assistance with activity   - Consult OT/PT to assist with strengthening/mobility   - Keep Call bell within reach  - Keep bed low and locked with side rails adjusted as appropriate  - Keep care items and personal belongings within reach  - Initiate and maintain comfort rounds  - Apply yellow socks and bracelet for high fall risk patients  - Consider moving patient to room near nurses station  Outcome: Progressing     Problem: MOBILITY - ADULT  Goal: Maintain or return to baseline ADL function  Description: INTERVENTIONS:  -  Assess patient's ability to carry out ADLs; assess patient's baseline for ADL function and identify physical deficits which impact ability to perform ADLs (bathing, care of mouth/teeth, toileting, grooming, dressing, etc )  - Assess/evaluate cause of self-care deficits   - Assess range of motion  - Assess patient's mobility; develop plan if impaired  - Assess patient's need for assistive devices and provide as appropriate  - Encourage maximum independence but intervene and supervise when necessary  - Involve family in performance of ADLs  - Assess for home care needs following discharge   - Consider OT consult to assist with ADL evaluation and planning for discharge  - Provide patient education as appropriate  Outcome: Progressing  Goal: Maintains/Returns to pre admission functional level  Description: INTERVENTIONS:  - Perform BMAT or MOVE assessment daily    - Set and communicate daily mobility goal to care team and patient/family/caregiver     - Collaborate with rehabilitation services on mobility goals if consulted  - Out of bed for toileting  - Record patient progress and toleration of activity level   Outcome: Progressing     Problem: Prexisting or High Potential for Compromised Skin Integrity  Goal: Skin integrity is maintained or improved  Description: INTERVENTIONS:  - Identify patients at risk for skin breakdown  - Assess and monitor skin integrity  - Assess and monitor nutrition and hydration status  - Monitor labs   - Assess for incontinence   - Turn and reposition patient  - Assist with mobility/ambulation  - Relieve pressure over bony prominences  - Avoid friction and shearing  - Provide appropriate hygiene as needed including keeping skin clean and dry  - Evaluate need for skin moisturizer/barrier cream  - Collaborate with interdisciplinary team   - Patient/family teaching  - Consider wound care consult   Outcome: Progressing

## 2021-09-16 NOTE — PROGRESS NOTES
INTERNAL MEDICINE RESIDENCY PROGRESS NOTE     Name: Ca Velazquez   Age & Sex: 60 y o  female   MRN: [id number]  Unit/Bed#: [facility] 908-01   Encounter: [id number]  Team: SOD Team A    PATIENT INFORMATION     Name: Ca Velazquez   Age & Sex: 60 y o  female   MRN: [id number]  Hospital Stay Days: 32    ASSESSMENT/PLAN     Principal Problem:    Ambulatory dysfunction  Active Problems:    Chronic pain syndrome    Esophageal reflux    Generalized anxiety disorder    Tardive dyskinesia    History of CVA (cerebrovascular accident)    Mixed hyperlipidemia      Mixed hyperlipidemia  Assessment & Plan  -On Atorvastatin 40 mg PO qd    History of CVA (cerebrovascular accident)  Assessment & Plan  -On Aspirin 81 mg PO qd  -On Atorvastatin 40 mg PO qd    Tardive dyskinesia  Assessment & Plan  -Continue Valbenazine 80 mg daily  -appears to have slightly improved with addition of Lyrica 100 mg t i d     Generalized anxiety disorder  Assessment & Plan  Patient is on multiple psychiatric medications and was recently evaluated by inpatient psych at [facility] with adjustments to her medications including buspirone 50 mg t i d , paroxetine 60 mg daily, quetiapine 50 mg 4 times daily, mirtazapine 7 5 mg q h s , lithium 300 mg q h s  And hydroxyzine 50 mg t i d  P r n  Frieda Stewart Patient continues to have increased anxiety with daily morning panic attacks  Patient also reports that her anxiety is worsened by her lack of pain control  Plan:  · Continue medical management  · Continue current psychotropic medication   · She is psychiatrically cleared to go to SNF  · Patient could benefit from outpatient psychiatric evaluation and follow-up  · Confirmed psych medication regimen following discharge from [address] [place] [address] as noted above  Esophageal reflux  Assessment & Plan  Continue Protonix 20 mg daily  Chronic pain syndrome  Assessment & Plan  Patient has been on chronic opioids for low back and leg pain    She was seen in the clinic and discussed opioid tapering  Most recent opioid prescription per PDMP was for morphine 15 mg for 10 days (20 tablets)  Per chart review, concern for polypharmacy and suspicion of drug-seeking  · On Tylenol 650 mg q8h  · Lidocaine patch/Voltaren gel  · On Lyrica 100 mg  T i d    · Oxy IR 2 5 mg q6h prn  · Bengay topical ointment 4 times daily PRN  ·  Added Aqua K    ·  Added Robaxin  500 mg q 6h p r n     * Ambulatory dysfunction  Assessment & Plan  Patient with decreased physical mobility, at risk for falls  She uses walker and wheelchair at home  Presented to the ED requesting placement to SNF  · Encourage good body mechanics and assist with transfers  · Keep personal items and call bell close to the patient  · PT/OT recommendations- Home with home health rehabilitation  Cough-resolved as of 2021  Assessment & Plan  Patient complains of progressive worsening cough from  to   Cough is nonproductive  Patient also reports associated chills, nasal congestion, and nausea  -COVID PCR negative  -chest x-ray unremarkable  -Robitussin for cough  -continue to trend white count and fevers    -resolved as of         Disposition: Patient is medically stable, case management has been actively working towards placement for this patient  SUBJECTIVE     Patient seen and examined  No acute events overnight  Patient has mild anxiety that is being managed with current medication therapy  OBJECTIVE     Vitals:    09/15/21 1551 09/15/21 2218 21 0614 21 0614   BP: 106/65 118/71 115/70 115/70   Pulse: 88  75    Resp: 18 18     Temp: 98 °F (36 7 °C)      TempSrc:       SpO2: 98%      Weight:       Height:          Temperature:   Temp (24hrs), Av 1 °F (36 7 °C), Min:98 °F (36 7 °C), Max:98 1 °F (36 7 °C)    Temperature: 98 °F (36 7 °C)  Intake & Output:  I/O        07 - 09/15 0700 09/15 07 -  0700  07 -  0700    P  O  400 717 Total Intake(mL/kg) 400 (4 4) 717 (7 9)     Net +400 +717                Weights:   IBW (Ideal Body Weight): 47 8 kg    Body mass index is 37 99 kg/m²  Weight (last 2 days)     None        Physical Exam  HENT:      Head: Normocephalic and atraumatic  Eyes:      Conjunctiva/sclera: Conjunctivae normal    Cardiovascular:      Rate and Rhythm: Normal rate and regular rhythm  Pulses: Normal pulses  Heart sounds: Normal heart sounds  Pulmonary:      Effort: Pulmonary effort is normal       Breath sounds: Normal breath sounds  Abdominal:      General: Bowel sounds are normal       Palpations: Abdomen is soft  Skin:     General: Skin is warm and dry  Capillary Refill: Capillary refill takes less than 2 seconds  Neurological:      Mental Status: She is alert and oriented to person, place, and time  LABORATORY DATA     Labs: I have personally reviewed pertinent reports  Results from last 7 days   Lab Units 09/13/21  0615   WBC Thousand/uL 4 03*   HEMOGLOBIN g/dL 13 4   HEMATOCRIT % 42 0   PLATELETS Thousands/uL 277   NEUTROS PCT % 60   MONOS PCT % 9          Invalid input(s): LABALBU                         IMAGING & DIAGNOSTIC TESTING     Radiology Results: I have personally reviewed pertinent reports  No results found  Other Diagnostic Testing: I have personally reviewed pertinent reports      ACTIVE MEDICATIONS     Current Facility-Administered Medications   Medication Dose Route Frequency    acetaminophen (TYLENOL) tablet 650 mg  650 mg Oral Q8H [address]    amLODIPine (NORVASC) tablet 5 mg  5 mg Oral Daily    ascorbic acid (VITAMIN C) tablet 500 mg  500 mg Oral BID    aspirin chewable tablet 81 mg  81 mg Oral Daily    atorvastatin (LIPITOR) tablet 40 mg  40 mg Oral Daily With Dinner    busPIRone (BUSPAR) tablet 15 mg  15 mg Oral TID    dextromethorphan-guaiFENesin (ROBITUSSIN DM) oral syrup 10 mL  10 mL Oral Q4H PRN    Diclofenac Sodium (VOLTAREN) 1 % topical gel 2 g  2 g Topical 4x Daily    enoxaparin (LOVENOX) subcutaneous injection 40 mg  40 mg Subcutaneous Daily    ferrous sulfate tablet 325 mg  325 mg Oral Daily With Breakfast    folic acid (FOLVITE) tablet 1,000 mcg  1,000 mcg Oral Daily    hydrOXYzine HCL (ATARAX) tablet 50 mg  50 mg Oral TID PRN    lidocaine (LIDODERM) 5 % patch 1 patch  1 patch Topical Daily    lidocaine (LIDODERM) 5 % patch 1 patch  1 patch Topical Daily    lithium carbonate (LITHOBID) CR tablet 300 mg  300 mg Oral HS    LORazepam (ATIVAN) tablet 0 5 mg  0 5 mg Oral Q8H PRN    menthol-methyl salicylate (BENGAY) 47-06 % cream   Apply externally 4x Daily PRN    methocarbamol (ROBAXIN) tablet 500 mg  500 mg Oral Q6H PRN    mirtazapine (REMERON) tablet 7 5 mg  7 5 mg Oral HS PRN    ondansetron (ZOFRAN-ODT) dispersible tablet 4 mg  4 mg Oral Q6H PRN    oxyCODONE-acetaminophen (PERCOCET) 5-325 mg per tablet 1 tablet  1 tablet Oral Q6H PRN    pantoprazole (PROTONIX) EC tablet 20 mg  20 mg Oral Early Morning    PARoxetine (PAXIL) tablet 60 mg  60 mg Oral Daily    polyethylene glycol (MIRALAX) packet 17 g  17 g Oral Daily    prazosin (MINIPRESS) capsule 2 mg  2 mg Oral Daily    pregabalin (LYRICA) capsule 100 mg  100 mg Oral TID    propranolol (INDERAL) tablet 20 mg  20 mg Oral BID before breakfast/lunch    QUEtiapine (SEROquel) tablet 50 mg  50 mg Oral 4x Daily (PC & HS)    senna-docusate sodium (SENOKOT S) 8 6-50 mg per tablet 1 tablet  1 tablet Oral Daily PRN    Valbenazine Tosylate CAPS 80 mg  80 mg Oral Daily       VTE Pharmacologic Prophylaxis: Enoxaparin (Lovenox)  VTE Mechanical Prophylaxis: sequential compression device    Portions of the record may have been created with voice recognition software  Occasional wrong word or "sound a like" substitutions may have occurred due to the inherent limitations of voice recognition software    Read the chart carefully and recognize, using context, where substitutions have occurred   ==  Ethyl [place], MD  [address]  Internal Medicine Residency PGY-1

## 2021-09-17 PROCEDURE — 99232 SBSQ HOSP IP/OBS MODERATE 35: CPT | Performed by: HOSPITALIST

## 2021-09-17 RX ADMIN — AMLODIPINE BESYLATE 5 MG: 5 TABLET ORAL at 08:40

## 2021-09-17 RX ADMIN — ACETAMINOPHEN 650 MG: 325 TABLET, FILM COATED ORAL at 06:14

## 2021-09-17 RX ADMIN — PREGABALIN 100 MG: 100 CAPSULE ORAL at 17:29

## 2021-09-17 RX ADMIN — OXYCODONE HYDROCHLORIDE AND ACETAMINOPHEN 1 TABLET: 5; 325 TABLET ORAL at 20:32

## 2021-09-17 RX ADMIN — OXYCODONE HYDROCHLORIDE AND ACETAMINOPHEN 500 MG: 500 TABLET ORAL at 17:28

## 2021-09-17 RX ADMIN — HYDROXYZINE HYDROCHLORIDE 50 MG: 25 TABLET, FILM COATED ORAL at 17:29

## 2021-09-17 RX ADMIN — ASPIRIN 81 MG CHEWABLE TABLET 81 MG: 81 TABLET CHEWABLE at 08:41

## 2021-09-17 RX ADMIN — PREGABALIN 100 MG: 100 CAPSULE ORAL at 08:41

## 2021-09-17 RX ADMIN — FERROUS SULFATE TAB 325 MG (65 MG ELEMENTAL FE) 325 MG: 325 (65 FE) TAB at 06:35

## 2021-09-17 RX ADMIN — OXYCODONE HYDROCHLORIDE AND ACETAMINOPHEN 1 TABLET: 5; 325 TABLET ORAL at 06:16

## 2021-09-17 RX ADMIN — ACETAMINOPHEN 650 MG: 325 TABLET, FILM COATED ORAL at 21:27

## 2021-09-17 RX ADMIN — OXYCODONE HYDROCHLORIDE AND ACETAMINOPHEN 1 TABLET: 5; 325 TABLET ORAL at 13:03

## 2021-09-17 RX ADMIN — PANTOPRAZOLE SODIUM 20 MG: 20 TABLET, DELAYED RELEASE ORAL at 06:15

## 2021-09-17 RX ADMIN — LORAZEPAM 0.5 MG: 0.5 TABLET ORAL at 17:29

## 2021-09-17 RX ADMIN — PREGABALIN 100 MG: 100 CAPSULE ORAL at 21:28

## 2021-09-17 RX ADMIN — VALBENAZINE 80 MG: 80 CAPSULE ORAL at 08:42

## 2021-09-17 RX ADMIN — PAROXETINE HYDROCHLORIDE 60 MG: 20 TABLET, FILM COATED ORAL at 21:29

## 2021-09-17 RX ADMIN — BUSPIRONE HYDROCHLORIDE 15 MG: 10 TABLET ORAL at 21:27

## 2021-09-17 RX ADMIN — LORAZEPAM 0.5 MG: 0.5 TABLET ORAL at 08:41

## 2021-09-17 RX ADMIN — PRAZOSIN HYDROCHLORIDE 2 MG: 2 CAPSULE ORAL at 08:40

## 2021-09-17 RX ADMIN — BUSPIRONE HYDROCHLORIDE 15 MG: 10 TABLET ORAL at 08:41

## 2021-09-17 RX ADMIN — FOLIC ACID 1000 MCG: 1 TABLET ORAL at 08:41

## 2021-09-17 RX ADMIN — QUETIAPINE FUMARATE 50 MG: 25 TABLET ORAL at 17:29

## 2021-09-17 RX ADMIN — QUETIAPINE FUMARATE 50 MG: 25 TABLET ORAL at 13:21

## 2021-09-17 RX ADMIN — BUSPIRONE HYDROCHLORIDE 15 MG: 10 TABLET ORAL at 17:28

## 2021-09-17 RX ADMIN — PROPRANOLOL HYDROCHLORIDE 20 MG: 20 TABLET ORAL at 06:19

## 2021-09-17 RX ADMIN — ENOXAPARIN SODIUM 40 MG: 40 INJECTION SUBCUTANEOUS at 08:39

## 2021-09-17 RX ADMIN — MENTHOL, METHYL SALICYLATE: 10; 15 CREAM TOPICAL at 13:04

## 2021-09-17 RX ADMIN — ACETAMINOPHEN 650 MG: 325 TABLET, FILM COATED ORAL at 13:21

## 2021-09-17 RX ADMIN — OXYCODONE HYDROCHLORIDE AND ACETAMINOPHEN 500 MG: 500 TABLET ORAL at 08:41

## 2021-09-17 RX ADMIN — QUETIAPINE FUMARATE 50 MG: 25 TABLET ORAL at 08:41

## 2021-09-17 RX ADMIN — ATORVASTATIN CALCIUM 40 MG: 40 TABLET, FILM COATED ORAL at 17:29

## 2021-09-17 RX ADMIN — HYDROXYZINE HYDROCHLORIDE 50 MG: 25 TABLET, FILM COATED ORAL at 08:40

## 2021-09-17 RX ADMIN — QUETIAPINE FUMARATE 50 MG: 25 TABLET ORAL at 21:28

## 2021-09-17 RX ADMIN — LITHIUM CARBONATE 300 MG: 300 TABLET, FILM COATED, EXTENDED RELEASE ORAL at 21:29

## 2021-09-17 NOTE — PROGRESS NOTES
INTERNAL MEDICINE RESIDENCY PROGRESS NOTE     Name: Mian Johnson   Age & Sex: 60 y o  female   MRN: [id number]  Unit/Bed#: [facility] 908-01   Encounter: [id number]  Team: SOD Team A    PATIENT INFORMATION     Name: Mian Johnson   Age & Sex: 60 y o  female   MRN: [id number]  Hospital Stay Days: 29    ASSESSMENT/PLAN     Principal Problem:    Ambulatory dysfunction  Active Problems:    Chronic pain syndrome    Esophageal reflux    Generalized anxiety disorder    Tardive dyskinesia    History of CVA (cerebrovascular accident)    Mixed hyperlipidemia      Mixed hyperlipidemia  Assessment & Plan  -On Atorvastatin 40 mg PO qd    History of CVA (cerebrovascular accident)  Assessment & Plan  -On Aspirin 81 mg PO qd  -On Atorvastatin 40 mg PO qd    Tardive dyskinesia  Assessment & Plan  -Continue Valbenazine 80 mg daily  -appears to have slightly improved with addition of Lyrica 100 mg t i d     Generalized anxiety disorder  Assessment & Plan  Patient is on multiple psychiatric medications and was recently evaluated by inpatient psych at [facility] with adjustments to her medications including buspirone 50 mg t i d , paroxetine 60 mg daily, quetiapine 50 mg 4 times daily, mirtazapine 7 5 mg q h s , lithium 300 mg q h s  And hydroxyzine 50 mg t i d  P r n  Morris Juan Manuelhoracio Patient continues to have increased anxiety with daily morning panic attacks  Patient also reports that her anxiety is worsened by her lack of pain control  Plan:  · Continue medical management  · Continue current psychotropic medication   · She is psychiatrically cleared to go to SNF  · Patient could benefit from outpatient psychiatric evaluation and follow-up  · Confirmed psych medication regimen following discharge from [address] [place] [address] as noted above  Esophageal reflux  Assessment & Plan  Continue Protonix 20 mg daily  Chronic pain syndrome  Assessment & Plan  Patient has been on chronic opioids for low back and leg pain    She was seen in the clinic and discussed opioid tapering  Most recent opioid prescription per PDMP was for morphine 15 mg for 10 days (20 tablets)  Per chart review, concern for polypharmacy and suspicion of drug-seeking  · On Tylenol 650 mg q8h  · Lidocaine patch/Voltaren gel  · On Lyrica 100 mg  T i d    · Oxy IR 2 5 mg q6h prn  · Bengay topical ointment 4 times daily PRN  ·  Added Aqua K    ·  Added Robaxin  500 mg q 6h p r n     * Ambulatory dysfunction  Assessment & Plan  Patient with decreased physical mobility, at risk for falls  She uses walker and wheelchair at home  Presented to the ED requesting placement to SNF  · Encourage good body mechanics and assist with transfers  · Keep personal items and call bell close to the patient  · PT/OT recommendations- Home with home health rehabilitation  Cough-resolved as of 2021  Assessment & Plan  Patient complains of progressive worsening cough from  to   Cough is nonproductive  Patient also reports associated chills, nasal congestion, and nausea  -COVID PCR negative  -chest x-ray unremarkable  -Robitussin for cough  -continue to trend white count and fevers    -resolved as of         Disposition: Patient is medically stable, case management has been actively working towards placement for this patient  Will consider shelter or other options if we continue to be unable to find suitable rehab  SUBJECTIVE     Patient seen and examined  No acute events overnight  Patient has mild anxiety that is being managed with current medication therapy      OBJECTIVE     Vitals:    21 2204 21 0617 21 0619 21 0807   BP: 114/79 125/76  145/99   BP Location:       Pulse: 82  75 69   Resp: 20   20   Temp: 98 1 °F (36 7 °C)   98 2 °F (36 8 °C)   TempSrc:       SpO2:    98%   Weight:       Height:          Temperature:   Temp (24hrs), Av 1 °F (36 7 °C), Min:98 °F (36 7 °C), Max:98 2 °F (36 8 °C)    Temperature: 98 2 °F (36 8 °C)  Intake & Output:  I/O       09/15 0701 - 09/16 0700 09/16 0701 - 09/17 0700 09/17 0701 - 09/18 0700    P  O  717 1100 240    Total Intake(mL/kg) 717 (7 9) 1100 (12 1) 240 (2 6)    Net +717 +1100 +240           Unmeasured Urine Occurrence  2 x         Weights:   IBW (Ideal Body Weight): 47 8 kg    Body mass index is 37 99 kg/m²  Weight (last 2 days)     None        Physical Exam  HENT:      Head: Normocephalic and atraumatic  Eyes:      Conjunctiva/sclera: Conjunctivae normal    Cardiovascular:      Rate and Rhythm: Normal rate and regular rhythm  Pulses: Normal pulses  Heart sounds: Normal heart sounds  Pulmonary:      Effort: Pulmonary effort is normal       Breath sounds: Normal breath sounds  Abdominal:      General: Bowel sounds are normal       Palpations: Abdomen is soft  Skin:     General: Skin is warm and dry  Capillary Refill: Capillary refill takes less than 2 seconds  Neurological:      Mental Status: She is alert and oriented to person, place, and time  Mental status is at baseline  LABORATORY DATA     Labs: I have personally reviewed pertinent reports  Results from last 7 days   Lab Units 09/13/21  0615   WBC Thousand/uL 4 03*   HEMOGLOBIN g/dL 13 4   HEMATOCRIT % 42 0   PLATELETS Thousands/uL 277   NEUTROS PCT % 60   MONOS PCT % 9          Invalid input(s): LABALBU                         IMAGING & DIAGNOSTIC TESTING     Radiology Results: I have personally reviewed pertinent reports  No results found  Other Diagnostic Testing: I have personally reviewed pertinent reports      ACTIVE MEDICATIONS     Current Facility-Administered Medications   Medication Dose Route Frequency    acetaminophen (TYLENOL) tablet 650 mg  650 mg Oral Q8H [address]    amLODIPine (NORVASC) tablet 5 mg  5 mg Oral Daily    ascorbic acid (VITAMIN C) tablet 500 mg  500 mg Oral BID    aspirin chewable tablet 81 mg  81 mg Oral Daily    atorvastatin (LIPITOR) tablet 40 mg  40 mg Oral Daily With Dinner    busPIRone (BUSPAR) tablet 15 mg  15 mg Oral TID    dextromethorphan-guaiFENesin (ROBITUSSIN DM) oral syrup 10 mL  10 mL Oral Q4H PRN    Diclofenac Sodium (VOLTAREN) 1 % topical gel 2 g  2 g Topical 4x Daily    enoxaparin (LOVENOX) subcutaneous injection 40 mg  40 mg Subcutaneous Daily    ferrous sulfate tablet 325 mg  325 mg Oral Daily With Breakfast    folic acid (FOLVITE) tablet 1,000 mcg  1,000 mcg Oral Daily    hydrOXYzine HCL (ATARAX) tablet 50 mg  50 mg Oral TID PRN    lidocaine (LIDODERM) 5 % patch 1 patch  1 patch Topical Daily    lidocaine (LIDODERM) 5 % patch 1 patch  1 patch Topical Daily    lithium carbonate (LITHOBID) CR tablet 300 mg  300 mg Oral HS    LORazepam (ATIVAN) tablet 0 5 mg  0 5 mg Oral Q8H PRN    menthol-methyl salicylate (BENGAY) 77-91 % cream   Apply externally 4x Daily PRN    methocarbamol (ROBAXIN) tablet 500 mg  500 mg Oral Q6H PRN    mirtazapine (REMERON) tablet 7 5 mg  7 5 mg Oral HS PRN    ondansetron (ZOFRAN-ODT) dispersible tablet 4 mg  4 mg Oral Q6H PRN    oxyCODONE-acetaminophen (PERCOCET) 5-325 mg per tablet 1 tablet  1 tablet Oral Q6H PRN    pantoprazole (PROTONIX) EC tablet 20 mg  20 mg Oral Early Morning    PARoxetine (PAXIL) tablet 60 mg  60 mg Oral Daily    polyethylene glycol (MIRALAX) packet 17 g  17 g Oral Daily    prazosin (MINIPRESS) capsule 2 mg  2 mg Oral Daily    pregabalin (LYRICA) capsule 100 mg  100 mg Oral TID    propranolol (INDERAL) tablet 20 mg  20 mg Oral BID before breakfast/lunch    QUEtiapine (SEROquel) tablet 50 mg  50 mg Oral 4x Daily (PC & HS)    senna-docusate sodium (SENOKOT S) 8 6-50 mg per tablet 1 tablet  1 tablet Oral Daily PRN    Valbenazine Tosylate CAPS 80 mg  80 mg Oral Daily       VTE Pharmacologic Prophylaxis: Enoxaparin (Lovenox)  VTE Mechanical Prophylaxis: sequential compression device    Portions of the record may have been created with voice recognition software    Occasional wrong word or "sound a like" substitutions may have occurred due to the inherent limitations of voice recognition software    Read the chart carefully and recognize, using context, where substitutions have occurred   ==  Antelmo Barrera MD  [address]  Internal Medicine Residency PGY-1

## 2021-09-17 NOTE — CASE MANAGEMENT
TCT [employer] [phone], Enedelia Grijalva left to return call with determination on acceptance    TCT son Lexx Ford, advised that we have no accepting facility for patient, she is stable to be [address] home with [facility]  Advised that insurance denied admission to [facility] and they have not accepted pt  He states his sister cannot take pt in as she lives on 2nd floor apartment, has 2 small children and is pregnant  States pt has panic attacks  States he works 12-9 and she would be alone  States his apartment does not support wheelchair or walker  Advised we have one other facility that is reviewing but if they do not accept we would need an alternative DCP  Advised I would call him as soon as I heard from Jem[facility]  Did mention possibility of shelter if unable to take pt home    14:15 TCT [place], verified [facility]oyd is in today, vm left to return call with decision    Updated pt we are waiting to hear from GONZALO  Advised we have no accepting facility and insurance has denied payment for SNF    Advised I did discuss with her son

## 2021-09-17 NOTE — CASE MANAGEMENT
PT advised she called [facility] and inquired why her stay was denied  They offered her the ability to appeal which she did  She states they told her it could be 30 days until she receives determination    She is aware we are waiting to hear from [facility] [facility], requested progress notes last 3 days, PT/OT notes (pt not seen since Tuesday) be faxed to [phone] for review    PT has appealed denial of SNF    PT notified, states [facility] called and told her they would make decision within 72 hours

## 2021-09-18 PROCEDURE — 99232 SBSQ HOSP IP/OBS MODERATE 35: CPT | Performed by: HOSPITALIST

## 2021-09-18 RX ADMIN — PROPRANOLOL HYDROCHLORIDE 20 MG: 20 TABLET ORAL at 06:44

## 2021-09-18 RX ADMIN — BUSPIRONE HYDROCHLORIDE 15 MG: 10 TABLET ORAL at 17:07

## 2021-09-18 RX ADMIN — PREGABALIN 100 MG: 100 CAPSULE ORAL at 09:47

## 2021-09-18 RX ADMIN — BUSPIRONE HYDROCHLORIDE 15 MG: 10 TABLET ORAL at 09:46

## 2021-09-18 RX ADMIN — PRAZOSIN HYDROCHLORIDE 2 MG: 2 CAPSULE ORAL at 09:51

## 2021-09-18 RX ADMIN — MIRTAZAPINE 7.5 MG: 15 TABLET, FILM COATED ORAL at 22:07

## 2021-09-18 RX ADMIN — LIDOCAINE 1 PATCH: 50 PATCH TOPICAL at 09:46

## 2021-09-18 RX ADMIN — ACETAMINOPHEN 650 MG: 325 TABLET, FILM COATED ORAL at 14:04

## 2021-09-18 RX ADMIN — PAROXETINE HYDROCHLORIDE 60 MG: 20 TABLET, FILM COATED ORAL at 21:37

## 2021-09-18 RX ADMIN — LORAZEPAM 0.5 MG: 0.5 TABLET ORAL at 05:29

## 2021-09-18 RX ADMIN — ASPIRIN 81 MG CHEWABLE TABLET 81 MG: 81 TABLET CHEWABLE at 09:47

## 2021-09-18 RX ADMIN — QUETIAPINE FUMARATE 50 MG: 25 TABLET ORAL at 11:38

## 2021-09-18 RX ADMIN — METHOCARBAMOL 500 MG: 500 TABLET, FILM COATED ORAL at 09:47

## 2021-09-18 RX ADMIN — DICLOFENAC SODIUM 2 G: 10 GEL TOPICAL at 21:38

## 2021-09-18 RX ADMIN — ENOXAPARIN SODIUM 40 MG: 40 INJECTION SUBCUTANEOUS at 09:47

## 2021-09-18 RX ADMIN — LORAZEPAM 0.5 MG: 0.5 TABLET ORAL at 14:04

## 2021-09-18 RX ADMIN — AMLODIPINE BESYLATE 5 MG: 5 TABLET ORAL at 09:46

## 2021-09-18 RX ADMIN — OXYCODONE HYDROCHLORIDE AND ACETAMINOPHEN 500 MG: 500 TABLET ORAL at 17:07

## 2021-09-18 RX ADMIN — QUETIAPINE FUMARATE 50 MG: 25 TABLET ORAL at 17:07

## 2021-09-18 RX ADMIN — ATORVASTATIN CALCIUM 40 MG: 40 TABLET, FILM COATED ORAL at 17:07

## 2021-09-18 RX ADMIN — ACETAMINOPHEN 650 MG: 325 TABLET, FILM COATED ORAL at 21:37

## 2021-09-18 RX ADMIN — PREGABALIN 100 MG: 100 CAPSULE ORAL at 21:37

## 2021-09-18 RX ADMIN — LORAZEPAM 0.5 MG: 0.5 TABLET ORAL at 22:07

## 2021-09-18 RX ADMIN — LITHIUM CARBONATE 300 MG: 300 TABLET, FILM COATED, EXTENDED RELEASE ORAL at 21:37

## 2021-09-18 RX ADMIN — BUSPIRONE HYDROCHLORIDE 15 MG: 10 TABLET ORAL at 21:36

## 2021-09-18 RX ADMIN — QUETIAPINE FUMARATE 50 MG: 25 TABLET ORAL at 09:46

## 2021-09-18 RX ADMIN — OXYCODONE HYDROCHLORIDE AND ACETAMINOPHEN 500 MG: 500 TABLET ORAL at 09:46

## 2021-09-18 RX ADMIN — FERROUS SULFATE TAB 325 MG (65 MG ELEMENTAL FE) 325 MG: 325 (65 FE) TAB at 09:46

## 2021-09-18 RX ADMIN — HYDROXYZINE HYDROCHLORIDE 50 MG: 25 TABLET, FILM COATED ORAL at 14:04

## 2021-09-18 RX ADMIN — ACETAMINOPHEN 650 MG: 325 TABLET, FILM COATED ORAL at 05:29

## 2021-09-18 RX ADMIN — DICLOFENAC SODIUM 2 G: 10 GEL TOPICAL at 17:10

## 2021-09-18 RX ADMIN — PANTOPRAZOLE SODIUM 20 MG: 20 TABLET, DELAYED RELEASE ORAL at 05:29

## 2021-09-18 RX ADMIN — OXYCODONE HYDROCHLORIDE AND ACETAMINOPHEN 1 TABLET: 5; 325 TABLET ORAL at 11:38

## 2021-09-18 RX ADMIN — OXYCODONE HYDROCHLORIDE AND ACETAMINOPHEN 1 TABLET: 5; 325 TABLET ORAL at 21:36

## 2021-09-18 RX ADMIN — FOLIC ACID 1000 MCG: 1 TABLET ORAL at 09:46

## 2021-09-18 RX ADMIN — OXYCODONE HYDROCHLORIDE AND ACETAMINOPHEN 1 TABLET: 5; 325 TABLET ORAL at 05:33

## 2021-09-18 RX ADMIN — HYDROXYZINE HYDROCHLORIDE 50 MG: 25 TABLET, FILM COATED ORAL at 05:29

## 2021-09-18 RX ADMIN — PREGABALIN 100 MG: 100 CAPSULE ORAL at 17:07

## 2021-09-18 RX ADMIN — DICLOFENAC SODIUM 2 G: 10 GEL TOPICAL at 11:46

## 2021-09-18 RX ADMIN — METHOCARBAMOL 500 MG: 500 TABLET, FILM COATED ORAL at 17:07

## 2021-09-18 RX ADMIN — QUETIAPINE FUMARATE 50 MG: 25 TABLET ORAL at 21:36

## 2021-09-18 NOTE — PROGRESS NOTES
INTERNAL MEDICINE RESIDENCY PROGRESS NOTE     Name: Malina Curtis   Age & Sex: 60 y o  female   MRN: [id number]  Unit/Bed#: [facility] 908-01   Encounter: [id number]  Team: SOD Team A    PATIENT INFORMATION     Name: Malina Curtis   Age & Sex: 60 y o  female   MRN: [id number]  Hospital Stay Days: 34    ASSESSMENT/PLAN     Principal Problem:    Ambulatory dysfunction  Active Problems:    Chronic pain syndrome    Esophageal reflux    Generalized anxiety disorder    Tardive dyskinesia    History of CVA (cerebrovascular accident)    Mixed hyperlipidemia      Mixed hyperlipidemia  Assessment & Plan  -On Atorvastatin 40 mg PO qd    History of CVA (cerebrovascular accident)  Assessment & Plan  -On Aspirin 81 mg PO qd  -On Atorvastatin 40 mg PO qd    Tardive dyskinesia  Assessment & Plan  -Continue Valbenazine 80 mg daily  -appears to have slightly improved with addition of Lyrica 100 mg t i d     Generalized anxiety disorder  Assessment & Plan  Patient is on multiple psychiatric medications and was recently evaluated by inpatient psych at [facility] with adjustments to her medications including buspirone 50 mg t i d , paroxetine 60 mg daily, quetiapine 50 mg 4 times daily, mirtazapine 7 5 mg q h s , lithium 300 mg q h s  And hydroxyzine 50 mg t i d  P r n  Fabiana Beyer Patient continues to have increased anxiety with daily morning panic attacks  Patient also reports that her anxiety is worsened by her lack of pain control  Plan:  · Continue medical management  · Continue current psychotropic medication   · She is psychiatrically cleared to go to SNF  · Patient could benefit from outpatient psychiatric evaluation and follow-up  · Confirmed psych medication regimen following discharge from [address] [place] [address] as noted above  Esophageal reflux  Assessment & Plan  Continue Protonix 20 mg daily  Chronic pain syndrome  Assessment & Plan  Patient has been on chronic opioids for low back and leg pain    She was seen in the clinic and discussed opioid tapering  Most recent opioid prescription per PDMP was for morphine 15 mg for 10 days (20 tablets)  Per chart review, concern for polypharmacy and suspicion of drug-seeking  · On Tylenol 650 mg q8h  · Lidocaine patch/Voltaren gel  · On Lyrica 100 mg  T i d    · Oxy IR 2 5 mg q6h prn  · Bengay topical ointment 4 times daily PRN  ·  Added Aqua K    ·  Added Robaxin  500 mg q 6h p r n     * Ambulatory dysfunction  Assessment & Plan  Patient with decreased physical mobility, at risk for falls  She uses walker and wheelchair at home  Presented to the ED requesting placement to SNF  · Encourage good body mechanics and assist with transfers  · Keep personal items and call bell close to the patient  · PT/OT recommendations- Home with home health rehabilitation  Cough-resolved as of 2021  Assessment & Plan  Patient complains of progressive worsening cough from  to   Cough is nonproductive  Patient also reports associated chills, nasal congestion, and nausea  -COVID PCR negative  -chest x-ray unremarkable  -Robitussin for cough  -continue to trend white count and fevers    -resolved as of         Disposition: Patient is medically stable, case management has been actively working towards placement for this patient  Will consider shelter or other options if we continue to be unable to find suitable rehab  SUBJECTIVE     Patient seen and examined  No acute events overnight  Patient has mild anxiety that is being managed with current medication therapy      OBJECTIVE     Vitals:    21 2255 21 0643 21 0814 21 1138   BP: 138/89 127/75 130/78 102/59   Pulse: 59 62  60   Resp: 18      Temp: 98 °F (36 7 °C)      TempSrc:       SpO2: 99%      Weight:       Height:          Temperature:   Temp (24hrs), Av 4 °F (36 9 °C), Min:98 °F (36 7 °C), Max:98 7 °F (37 1 °C)    Temperature: 98 °F (36 7 °C)  Intake & Output:  I/O 09/16 0701 - 09/17 0700 09/17 0701 - 09/18 0700 09/18 0701 - 09/19 0700    P  O  1100 1700 360    Total Intake(mL/kg) 1100 (12 1) 1700 (18 6) 360 (3 9)    Urine (mL/kg/hr)  0 (0)     Total Output  0     Net +1100 +1700 +360           Unmeasured Urine Occurrence 2 x 4 x         Weights:   IBW (Ideal Body Weight): 47 8 kg    Body mass index is 37 99 kg/m²  Weight (last 2 days)     None        Physical Exam  HENT:      Head: Normocephalic and atraumatic  Eyes:      Conjunctiva/sclera: Conjunctivae normal    Cardiovascular:      Rate and Rhythm: Normal rate and regular rhythm  Pulses: Normal pulses  Heart sounds: Normal heart sounds  Pulmonary:      Effort: Pulmonary effort is normal       Breath sounds: Normal breath sounds  Abdominal:      General: Bowel sounds are normal       Palpations: Abdomen is soft  Skin:     General: Skin is warm and dry  Capillary Refill: Capillary refill takes less than 2 seconds  Neurological:      Mental Status: She is alert and oriented to person, place, and time  Mental status is at baseline  LABORATORY DATA     Labs: I have personally reviewed pertinent reports  Results from last 7 days   Lab Units 09/13/21  0615   WBC Thousand/uL 4 03*   HEMOGLOBIN g/dL 13 4   HEMATOCRIT % 42 0   PLATELETS Thousands/uL 277   NEUTROS PCT % 60   MONOS PCT % 9          Invalid input(s): LABALBU                         IMAGING & DIAGNOSTIC TESTING     Radiology Results: I have personally reviewed pertinent reports  No results found  Other Diagnostic Testing: I have personally reviewed pertinent reports      ACTIVE MEDICATIONS     Current Facility-Administered Medications   Medication Dose Route Frequency    acetaminophen (TYLENOL) tablet 650 mg  650 mg Oral Q8H [address]    amLODIPine (NORVASC) tablet 5 mg  5 mg Oral Daily    ascorbic acid (VITAMIN C) tablet 500 mg  500 mg Oral BID    aspirin chewable tablet 81 mg  81 mg Oral Daily    atorvastatin (LIPITOR) tablet 40 mg  40 mg Oral Daily With Dinner    busPIRone (BUSPAR) tablet 15 mg  15 mg Oral TID    dextromethorphan-guaiFENesin (ROBITUSSIN DM) oral syrup 10 mL  10 mL Oral Q4H PRN    Diclofenac Sodium (VOLTAREN) 1 % topical gel 2 g  2 g Topical 4x Daily    enoxaparin (LOVENOX) subcutaneous injection 40 mg  40 mg Subcutaneous Daily    ferrous sulfate tablet 325 mg  325 mg Oral Daily With Breakfast    folic acid (FOLVITE) tablet 1,000 mcg  1,000 mcg Oral Daily    hydrOXYzine HCL (ATARAX) tablet 50 mg  50 mg Oral TID PRN    lidocaine (LIDODERM) 5 % patch 1 patch  1 patch Topical Daily    lidocaine (LIDODERM) 5 % patch 1 patch  1 patch Topical Daily    lithium carbonate (LITHOBID) CR tablet 300 mg  300 mg Oral HS    LORazepam (ATIVAN) tablet 0 5 mg  0 5 mg Oral Q8H PRN    menthol-methyl salicylate (BENGAY) 42-87 % cream   Apply externally 4x Daily PRN    methocarbamol (ROBAXIN) tablet 500 mg  500 mg Oral Q6H PRN    mirtazapine (REMERON) tablet 7 5 mg  7 5 mg Oral HS PRN    ondansetron (ZOFRAN-ODT) dispersible tablet 4 mg  4 mg Oral Q6H PRN    oxyCODONE-acetaminophen (PERCOCET) 5-325 mg per tablet 1 tablet  1 tablet Oral Q6H PRN    pantoprazole (PROTONIX) EC tablet 20 mg  20 mg Oral Early Morning    PARoxetine (PAXIL) tablet 60 mg  60 mg Oral Daily    polyethylene glycol (MIRALAX) packet 17 g  17 g Oral Daily    prazosin (MINIPRESS) capsule 2 mg  2 mg Oral Daily    pregabalin (LYRICA) capsule 100 mg  100 mg Oral TID    propranolol (INDERAL) tablet 20 mg  20 mg Oral BID before breakfast/lunch    QUEtiapine (SEROquel) tablet 50 mg  50 mg Oral 4x Daily (PC & HS)    senna-docusate sodium (SENOKOT S) 8 6-50 mg per tablet 1 tablet  1 tablet Oral Daily PRN    Valbenazine Tosylate CAPS 80 mg  80 mg Oral Daily       VTE Pharmacologic Prophylaxis: Enoxaparin (Lovenox)  VTE Mechanical Prophylaxis: sequential compression device    Portions of the record may have been created with voice recognition software    Occasional wrong word or "sound a like" substitutions may have occurred due to the inherent limitations of voice recognition software    Read the chart carefully and recognize, using context, where substitutions have occurred   ==  Sammy Fabry, MD  [address] [facility]  Internal Medicine Residency PGY-1

## 2021-09-19 LAB
GLUCOSE SERPL-MCNC: 105 MG/DL (ref 65–140)
GLUCOSE SERPL-MCNC: 78 MG/DL (ref 65–140)
GLUCOSE SERPL-MCNC: 83 MG/DL (ref 65–140)

## 2021-09-19 PROCEDURE — 82948 REAGENT STRIP/BLOOD GLUCOSE: CPT

## 2021-09-19 PROCEDURE — 99232 SBSQ HOSP IP/OBS MODERATE 35: CPT | Performed by: HOSPITALIST

## 2021-09-19 RX ADMIN — FERROUS SULFATE TAB 325 MG (65 MG ELEMENTAL FE) 325 MG: 325 (65 FE) TAB at 08:27

## 2021-09-19 RX ADMIN — PANTOPRAZOLE SODIUM 20 MG: 20 TABLET, DELAYED RELEASE ORAL at 05:54

## 2021-09-19 RX ADMIN — HYDROXYZINE HYDROCHLORIDE 50 MG: 25 TABLET, FILM COATED ORAL at 05:54

## 2021-09-19 RX ADMIN — PROPRANOLOL HYDROCHLORIDE 20 MG: 20 TABLET ORAL at 06:09

## 2021-09-19 RX ADMIN — LITHIUM CARBONATE 300 MG: 300 TABLET, FILM COATED, EXTENDED RELEASE ORAL at 21:54

## 2021-09-19 RX ADMIN — OXYCODONE HYDROCHLORIDE AND ACETAMINOPHEN 500 MG: 500 TABLET ORAL at 08:27

## 2021-09-19 RX ADMIN — LIDOCAINE 1 PATCH: 50 PATCH TOPICAL at 08:27

## 2021-09-19 RX ADMIN — QUETIAPINE FUMARATE 50 MG: 25 TABLET ORAL at 13:17

## 2021-09-19 RX ADMIN — PREGABALIN 100 MG: 100 CAPSULE ORAL at 17:09

## 2021-09-19 RX ADMIN — ENOXAPARIN SODIUM 40 MG: 40 INJECTION SUBCUTANEOUS at 08:26

## 2021-09-19 RX ADMIN — HYDROXYZINE HYDROCHLORIDE 50 MG: 25 TABLET, FILM COATED ORAL at 21:52

## 2021-09-19 RX ADMIN — QUETIAPINE FUMARATE 50 MG: 25 TABLET ORAL at 08:26

## 2021-09-19 RX ADMIN — ATORVASTATIN CALCIUM 40 MG: 40 TABLET, FILM COATED ORAL at 17:05

## 2021-09-19 RX ADMIN — METHOCARBAMOL 500 MG: 500 TABLET, FILM COATED ORAL at 17:03

## 2021-09-19 RX ADMIN — METHOCARBAMOL 500 MG: 500 TABLET, FILM COATED ORAL at 08:26

## 2021-09-19 RX ADMIN — PRAZOSIN HYDROCHLORIDE 2 MG: 2 CAPSULE ORAL at 08:29

## 2021-09-19 RX ADMIN — BUSPIRONE HYDROCHLORIDE 15 MG: 10 TABLET ORAL at 21:51

## 2021-09-19 RX ADMIN — MIRTAZAPINE 7.5 MG: 15 TABLET, FILM COATED ORAL at 21:52

## 2021-09-19 RX ADMIN — PREGABALIN 100 MG: 100 CAPSULE ORAL at 21:53

## 2021-09-19 RX ADMIN — ACETAMINOPHEN 650 MG: 325 TABLET, FILM COATED ORAL at 05:53

## 2021-09-19 RX ADMIN — BUSPIRONE HYDROCHLORIDE 15 MG: 10 TABLET ORAL at 17:05

## 2021-09-19 RX ADMIN — LORAZEPAM 0.5 MG: 0.5 TABLET ORAL at 06:09

## 2021-09-19 RX ADMIN — PREGABALIN 100 MG: 100 CAPSULE ORAL at 08:26

## 2021-09-19 RX ADMIN — OXYCODONE HYDROCHLORIDE AND ACETAMINOPHEN 1 TABLET: 5; 325 TABLET ORAL at 05:53

## 2021-09-19 RX ADMIN — BUSPIRONE HYDROCHLORIDE 15 MG: 10 TABLET ORAL at 08:27

## 2021-09-19 RX ADMIN — ACETAMINOPHEN 650 MG: 325 TABLET, FILM COATED ORAL at 21:51

## 2021-09-19 RX ADMIN — QUETIAPINE FUMARATE 50 MG: 25 TABLET ORAL at 17:05

## 2021-09-19 RX ADMIN — FOLIC ACID 1000 MCG: 1 TABLET ORAL at 08:27

## 2021-09-19 RX ADMIN — PAROXETINE HYDROCHLORIDE 60 MG: 20 TABLET, FILM COATED ORAL at 21:53

## 2021-09-19 RX ADMIN — ASPIRIN 81 MG CHEWABLE TABLET 81 MG: 81 TABLET CHEWABLE at 08:26

## 2021-09-19 RX ADMIN — DICLOFENAC SODIUM 2 G: 10 GEL TOPICAL at 13:19

## 2021-09-19 RX ADMIN — OXYCODONE HYDROCHLORIDE AND ACETAMINOPHEN 1 TABLET: 5; 325 TABLET ORAL at 13:18

## 2021-09-19 RX ADMIN — QUETIAPINE FUMARATE 50 MG: 25 TABLET ORAL at 21:51

## 2021-09-19 RX ADMIN — DICLOFENAC SODIUM 2 G: 10 GEL TOPICAL at 08:29

## 2021-09-19 RX ADMIN — OXYCODONE HYDROCHLORIDE AND ACETAMINOPHEN 500 MG: 500 TABLET ORAL at 17:05

## 2021-09-19 RX ADMIN — HYDROXYZINE HYDROCHLORIDE 50 MG: 25 TABLET, FILM COATED ORAL at 13:17

## 2021-09-19 RX ADMIN — LORAZEPAM 0.5 MG: 0.5 TABLET ORAL at 17:04

## 2021-09-19 RX ADMIN — OXYCODONE HYDROCHLORIDE AND ACETAMINOPHEN 1 TABLET: 5; 325 TABLET ORAL at 19:42

## 2021-09-19 RX ADMIN — AMLODIPINE BESYLATE 5 MG: 5 TABLET ORAL at 08:27

## 2021-09-19 RX ADMIN — PROPRANOLOL HYDROCHLORIDE 20 MG: 20 TABLET ORAL at 13:19

## 2021-09-19 RX ADMIN — DICLOFENAC SODIUM 2 G: 10 GEL TOPICAL at 21:50

## 2021-09-19 RX ADMIN — ACETAMINOPHEN 650 MG: 325 TABLET, FILM COATED ORAL at 13:17

## 2021-09-19 NOTE — PROGRESS NOTES
INTERNAL MEDICINE RESIDENCY PROGRESS NOTE     Name: Eusebia Pérez   Age & Sex: 60 y o  female   MRN: [id number]  Unit/Bed#: [facility] 908-01   Encounter: [id number]  Team: SOD Team A    PATIENT INFORMATION     Name: Eusebia Pérez   Age & Sex: 60 y o  female   MRN: [id number]  Hospital Stay Days: 30    ASSESSMENT/PLAN     Principal Problem:    Ambulatory dysfunction  Active Problems:    Chronic pain syndrome    Esophageal reflux    Generalized anxiety disorder    Tardive dyskinesia    History of CVA (cerebrovascular accident)    Mixed hyperlipidemia      Mixed hyperlipidemia  Assessment & Plan  -On Atorvastatin 40 mg PO qd    History of CVA (cerebrovascular accident)  Assessment & Plan  -On Aspirin 81 mg PO qd  -On Atorvastatin 40 mg PO qd    Tardive dyskinesia  Assessment & Plan  -Continue Valbenazine 80 mg daily  -appears to have slightly improved with addition of Lyrica 100 mg t i d     Generalized anxiety disorder  Assessment & Plan  Patient is on multiple psychiatric medications and was recently evaluated by inpatient psych at [facility] with adjustments to her medications including buspirone 50 mg t i d , paroxetine 60 mg daily, quetiapine 50 mg 4 times daily, mirtazapine 7 5 mg q h s , lithium 300 mg q h s  And hydroxyzine 50 mg t i d  P r n  [facility] Patient continues to have increased anxiety with daily morning panic attacks  Patient also reports that her anxiety is worsened by her lack of pain control  Plan:  · Continue medical management  · Continue current psychotropic medication   · She is psychiatrically cleared to go to SNF  · Patient could benefit from outpatient psychiatric evaluation and follow-up  · Confirmed psych medication regimen following discharge from [address] [place] [address] as noted above  Esophageal reflux  Assessment & Plan  Continue Protonix 20 mg daily  Chronic pain syndrome  Assessment & Plan  Patient has been on chronic opioids for low back and leg pain    She was seen in the clinic and discussed opioid tapering  Most recent opioid prescription per PDMP was for morphine 15 mg for 10 days (20 tablets)  Per chart review, concern for polypharmacy and suspicion of drug-seeking  · On Tylenol 650 mg q8h  · Lidocaine patch/Voltaren gel  · On Lyrica 100 mg  T i d    · Oxy IR 2 5 mg q6h prn  · Bengay topical ointment 4 times daily PRN  ·  Added Aqua K    ·  Added Robaxin  500 mg q 6h p r n     * Ambulatory dysfunction  Assessment & Plan  Patient with decreased physical mobility, at risk for falls  She uses walker and wheelchair at home  Presented to the ED requesting placement to SNF  · Encourage good body mechanics and assist with transfers  · Keep personal items and call bell close to the patient  · PT/OT recommendations- Home with home health rehabilitation  Cough-resolved as of 2021  Assessment & Plan  Patient complains of progressive worsening cough from  to   Cough is nonproductive  Patient also reports associated chills, nasal congestion, and nausea  -COVID PCR negative  -chest x-ray unremarkable  -Robitussin for cough  -continue to trend white count and fevers    -resolved as of         Disposition: pending placement    SUBJECTIVE     Patient seen and examined  No acute events overnight  Patient continues to endorse ongoing anxiety    OBJECTIVE     Vitals:    21 1138 21 1500 21 2314 21 0609   BP: 102/59 110/60 123/73 115/68   Pulse: 60 61  66   Resp:  18 18    Temp:  98 2 °F (36 8 °C) 98 °F (36 7 °C)    TempSrc:  Oral     SpO2:  97%     Weight:       Height:          Temperature:   Temp (24hrs), Av 1 °F (36 7 °C), Min:98 °F (36 7 °C), Max:98 2 °F (36 8 °C)    Temperature: 98 °F (36 7 °C)  Intake & Output:  I/O        07 -  0700  07 -  0700    P  O  1700 1020    Total Intake(mL/kg) 1700 (18 6) 1020 (11 2)    Urine (mL/kg/hr) 0 (0)     Total Output 0     Net +1700 +1020 Unmeasured Urine Occurrence 4 x 1 x        Weights:   IBW (Ideal Body Weight): 47 8 kg    Body mass index is 37 99 kg/m²  Weight (last 2 days)     None        Physical Exam  Vitals reviewed  Constitutional:       General: She is not in acute distress  Appearance: Normal appearance  She is not ill-appearing, toxic-appearing or diaphoretic  HENT:      Head: Normocephalic and atraumatic  Right Ear: External ear normal       Left Ear: External ear normal       Nose: Nose normal       Mouth/Throat:      Mouth: Mucous membranes are moist       Pharynx: Oropharynx is clear  Eyes:      General:         Right eye: No discharge  Left eye: No discharge  Conjunctiva/sclera: Conjunctivae normal    Cardiovascular:      Rate and Rhythm: Normal rate and regular rhythm  Pulmonary:      Effort: Pulmonary effort is normal  No respiratory distress  Breath sounds: Normal breath sounds  Abdominal:      General: Bowel sounds are normal       Palpations: Abdomen is soft  Musculoskeletal:         General: Tenderness present  Right lower leg: No edema  Left lower leg: No edema  Skin:     General: Skin is warm and dry  Coloration: Skin is not jaundiced  Findings: No bruising  Neurological:      General: No focal deficit present  Mental Status: She is alert and oriented to person, place, and time  Psychiatric:         Mood and Affect: Mood is anxious  Speech: Speech is delayed  Behavior: Behavior normal  Behavior is cooperative  LABORATORY DATA     Labs: I have personally reviewed pertinent reports  Results from last 7 days   Lab Units 09/13/21  0615   WBC Thousand/uL 4 03*   HEMOGLOBIN g/dL 13 4   HEMATOCRIT % 42 0   PLATELETS Thousands/uL 277   NEUTROS PCT % 60   MONOS PCT % 9          Invalid input(s): LABALBU                         IMAGING & DIAGNOSTIC TESTING     Radiology Results: I have personally reviewed pertinent reports    No results found  Other Diagnostic Testing: I have personally reviewed pertinent reports      ACTIVE MEDICATIONS     Current Facility-Administered Medications   Medication Dose Route Frequency    acetaminophen (TYLENOL) tablet 650 mg  650 mg Oral Q8H [address]    amLODIPine (NORVASC) tablet 5 mg  5 mg Oral Daily    ascorbic acid (VITAMIN C) tablet 500 mg  500 mg Oral BID    aspirin chewable tablet 81 mg  81 mg Oral Daily    atorvastatin (LIPITOR) tablet 40 mg  40 mg Oral Daily With Dinner    busPIRone (BUSPAR) tablet 15 mg  15 mg Oral TID    dextromethorphan-guaiFENesin (ROBITUSSIN DM) oral syrup 10 mL  10 mL Oral Q4H PRN    Diclofenac Sodium (VOLTAREN) 1 % topical gel 2 g  2 g Topical 4x Daily    enoxaparin (LOVENOX) subcutaneous injection 40 mg  40 mg Subcutaneous Daily    ferrous sulfate tablet 325 mg  325 mg Oral Daily With Breakfast    folic acid (FOLVITE) tablet 1,000 mcg  1,000 mcg Oral Daily    hydrOXYzine HCL (ATARAX) tablet 50 mg  50 mg Oral TID PRN    lidocaine (LIDODERM) 5 % patch 1 patch  1 patch Topical Daily    lidocaine (LIDODERM) 5 % patch 1 patch  1 patch Topical Daily    lithium carbonate (LITHOBID) CR tablet 300 mg  300 mg Oral HS    LORazepam (ATIVAN) tablet 0 5 mg  0 5 mg Oral Q8H PRN    menthol-methyl salicylate (BENGAY) 17-66 % cream   Apply externally 4x Daily PRN    methocarbamol (ROBAXIN) tablet 500 mg  500 mg Oral Q6H PRN    mirtazapine (REMERON) tablet 7 5 mg  7 5 mg Oral HS PRN    ondansetron (ZOFRAN-ODT) dispersible tablet 4 mg  4 mg Oral Q6H PRN    oxyCODONE-acetaminophen (PERCOCET) 5-325 mg per tablet 1 tablet  1 tablet Oral Q6H PRN    pantoprazole (PROTONIX) EC tablet 20 mg  20 mg Oral Early Morning    PARoxetine (PAXIL) tablet 60 mg  60 mg Oral Daily    polyethylene glycol (MIRALAX) packet 17 g  17 g Oral Daily    prazosin (MINIPRESS) capsule 2 mg  2 mg Oral Daily    pregabalin (LYRICA) capsule 100 mg  100 mg Oral TID    propranolol (INDERAL) tablet 20 mg  20 mg Oral BID before breakfast/lunch    QUEtiapine (SEROquel) tablet 50 mg  50 mg Oral 4x Daily (PC & HS)    senna-docusate sodium (SENOKOT S) 8 6-50 mg per tablet 1 tablet  1 tablet Oral Daily PRN    Valbenazine Tosylate CAPS 80 mg  80 mg Oral Daily       VTE Pharmacologic Prophylaxis: Enoxaparin (Lovenox)  VTE Mechanical Prophylaxis: sequential compression device    Portions of the record may have been created with voice recognition software  Occasional wrong word or "sound a like" substitutions may have occurred due to the inherent limitations of voice recognition software    Read the chart carefully and recognize, using context, where substitutions have occurred   ==  Manjit Kelly, [facility]  Internal Medicine Residency PGY-2

## 2021-09-20 LAB
ANION GAP SERPL CALCULATED.3IONS-SCNC: 1 MMOL/L (ref 4–13)
BASOPHILS # BLD AUTO: 0.03 THOUSANDS/ΜL (ref 0–0.1)
BASOPHILS NFR BLD AUTO: 1 % (ref 0–1)
BUN SERPL-MCNC: 10 MG/DL (ref 5–25)
CALCIUM SERPL-MCNC: 8.4 MG/DL (ref 8.3–10.1)
CHLORIDE SERPL-SCNC: 110 MMOL/L (ref 100–108)
CO2 SERPL-SCNC: 31 MMOL/L (ref 21–32)
CREAT SERPL-MCNC: 0.71 MG/DL (ref 0.6–1.3)
EOSINOPHIL # BLD AUTO: 0.17 THOUSAND/ΜL (ref 0–0.61)
EOSINOPHIL NFR BLD AUTO: 4 % (ref 0–6)
ERYTHROCYTE [DISTWIDTH] IN BLOOD BY AUTOMATED COUNT: 13.8 % (ref 11.6–15.1)
GFR SERPL CREATININE-BSD FRML MDRD: 109 ML/MIN/1.73SQ M
GLUCOSE SERPL-MCNC: 78 MG/DL (ref 65–140)
HCT VFR BLD AUTO: 39.3 % (ref 34.8–46.1)
HGB BLD-MCNC: 12.6 G/DL (ref 11.5–15.4)
IMM GRANULOCYTES # BLD AUTO: 0.01 THOUSAND/UL (ref 0–0.2)
IMM GRANULOCYTES NFR BLD AUTO: 0 % (ref 0–2)
LYMPHOCYTES # BLD AUTO: 1.44 THOUSANDS/ΜL (ref 0.6–4.47)
LYMPHOCYTES NFR BLD AUTO: 36 % (ref 14–44)
MCH RBC QN AUTO: 29.4 PG (ref 26.8–34.3)
MCHC RBC AUTO-ENTMCNC: 32.1 G/DL (ref 31.4–37.4)
MCV RBC AUTO: 92 FL (ref 82–98)
MONOCYTES # BLD AUTO: 0.38 THOUSAND/ΜL (ref 0.17–1.22)
MONOCYTES NFR BLD AUTO: 9 % (ref 4–12)
NEUTROPHILS # BLD AUTO: 2.02 THOUSANDS/ΜL (ref 1.85–7.62)
NEUTS SEG NFR BLD AUTO: 50 % (ref 43–75)
NRBC BLD AUTO-RTO: 0 /100 WBCS
PLATELET # BLD AUTO: 245 THOUSANDS/UL (ref 149–390)
PMV BLD AUTO: 9.7 FL (ref 8.9–12.7)
POTASSIUM SERPL-SCNC: 3.5 MMOL/L (ref 3.5–5.3)
RBC # BLD AUTO: 4.28 MILLION/UL (ref 3.81–5.12)
SODIUM SERPL-SCNC: 142 MMOL/L (ref 136–145)
WBC # BLD AUTO: 4.05 THOUSAND/UL (ref 4.31–10.16)

## 2021-09-20 PROCEDURE — 99232 SBSQ HOSP IP/OBS MODERATE 35: CPT | Performed by: INTERNAL MEDICINE

## 2021-09-20 PROCEDURE — 80048 BASIC METABOLIC PNL TOTAL CA: CPT | Performed by: STUDENT IN AN ORGANIZED HEALTH CARE EDUCATION/TRAINING PROGRAM

## 2021-09-20 PROCEDURE — 97535 SELF CARE MNGMENT TRAINING: CPT

## 2021-09-20 PROCEDURE — 85025 COMPLETE CBC W/AUTO DIFF WBC: CPT | Performed by: STUDENT IN AN ORGANIZED HEALTH CARE EDUCATION/TRAINING PROGRAM

## 2021-09-20 PROCEDURE — 97110 THERAPEUTIC EXERCISES: CPT

## 2021-09-20 RX ADMIN — QUETIAPINE FUMARATE 50 MG: 25 TABLET ORAL at 21:24

## 2021-09-20 RX ADMIN — ATORVASTATIN CALCIUM 40 MG: 40 TABLET, FILM COATED ORAL at 16:57

## 2021-09-20 RX ADMIN — QUETIAPINE FUMARATE 50 MG: 25 TABLET ORAL at 11:39

## 2021-09-20 RX ADMIN — QUETIAPINE FUMARATE 50 MG: 25 TABLET ORAL at 16:57

## 2021-09-20 RX ADMIN — PREGABALIN 100 MG: 100 CAPSULE ORAL at 21:25

## 2021-09-20 RX ADMIN — FERROUS SULFATE TAB 325 MG (65 MG ELEMENTAL FE) 325 MG: 325 (65 FE) TAB at 08:44

## 2021-09-20 RX ADMIN — BUSPIRONE HYDROCHLORIDE 15 MG: 10 TABLET ORAL at 08:45

## 2021-09-20 RX ADMIN — PANTOPRAZOLE SODIUM 20 MG: 20 TABLET, DELAYED RELEASE ORAL at 06:31

## 2021-09-20 RX ADMIN — METHOCARBAMOL 500 MG: 500 TABLET, FILM COATED ORAL at 00:22

## 2021-09-20 RX ADMIN — ENOXAPARIN SODIUM 40 MG: 40 INJECTION SUBCUTANEOUS at 08:46

## 2021-09-20 RX ADMIN — DICLOFENAC SODIUM 2 G: 10 GEL TOPICAL at 08:46

## 2021-09-20 RX ADMIN — LORAZEPAM 0.5 MG: 0.5 TABLET ORAL at 16:57

## 2021-09-20 RX ADMIN — PRAZOSIN HYDROCHLORIDE 2 MG: 2 CAPSULE ORAL at 11:38

## 2021-09-20 RX ADMIN — ACETAMINOPHEN 650 MG: 325 TABLET, FILM COATED ORAL at 21:23

## 2021-09-20 RX ADMIN — ASPIRIN 81 MG CHEWABLE TABLET 81 MG: 81 TABLET CHEWABLE at 08:45

## 2021-09-20 RX ADMIN — PAROXETINE HYDROCHLORIDE 60 MG: 20 TABLET, FILM COATED ORAL at 21:22

## 2021-09-20 RX ADMIN — ACETAMINOPHEN 650 MG: 325 TABLET, FILM COATED ORAL at 12:01

## 2021-09-20 RX ADMIN — LORAZEPAM 0.5 MG: 0.5 TABLET ORAL at 06:32

## 2021-09-20 RX ADMIN — OXYCODONE HYDROCHLORIDE AND ACETAMINOPHEN 1 TABLET: 5; 325 TABLET ORAL at 16:57

## 2021-09-20 RX ADMIN — OXYCODONE HYDROCHLORIDE AND ACETAMINOPHEN 500 MG: 500 TABLET ORAL at 08:45

## 2021-09-20 RX ADMIN — LITHIUM CARBONATE 300 MG: 300 TABLET, FILM COATED, EXTENDED RELEASE ORAL at 21:22

## 2021-09-20 RX ADMIN — HYDROXYZINE HYDROCHLORIDE 50 MG: 25 TABLET, FILM COATED ORAL at 06:32

## 2021-09-20 RX ADMIN — PROPRANOLOL HYDROCHLORIDE 20 MG: 20 TABLET ORAL at 11:36

## 2021-09-20 RX ADMIN — OXYCODONE HYDROCHLORIDE AND ACETAMINOPHEN 1 TABLET: 5; 325 TABLET ORAL at 06:31

## 2021-09-20 RX ADMIN — DICLOFENAC SODIUM 2 G: 10 GEL TOPICAL at 11:38

## 2021-09-20 RX ADMIN — ACETAMINOPHEN 650 MG: 325 TABLET, FILM COATED ORAL at 06:31

## 2021-09-20 RX ADMIN — PROPRANOLOL HYDROCHLORIDE 20 MG: 20 TABLET ORAL at 06:33

## 2021-09-20 RX ADMIN — PREGABALIN 100 MG: 100 CAPSULE ORAL at 08:45

## 2021-09-20 RX ADMIN — BUSPIRONE HYDROCHLORIDE 15 MG: 10 TABLET ORAL at 21:24

## 2021-09-20 RX ADMIN — HYDROXYZINE HYDROCHLORIDE 50 MG: 25 TABLET, FILM COATED ORAL at 11:36

## 2021-09-20 RX ADMIN — OXYCODONE HYDROCHLORIDE AND ACETAMINOPHEN 1 TABLET: 5; 325 TABLET ORAL at 12:00

## 2021-09-20 RX ADMIN — AMLODIPINE BESYLATE 5 MG: 5 TABLET ORAL at 08:45

## 2021-09-20 RX ADMIN — OXYCODONE HYDROCHLORIDE AND ACETAMINOPHEN 500 MG: 500 TABLET ORAL at 17:01

## 2021-09-20 RX ADMIN — BUSPIRONE HYDROCHLORIDE 15 MG: 10 TABLET ORAL at 16:56

## 2021-09-20 RX ADMIN — FOLIC ACID 1000 MCG: 1 TABLET ORAL at 08:45

## 2021-09-20 RX ADMIN — DICLOFENAC SODIUM 2 G: 10 GEL TOPICAL at 17:01

## 2021-09-20 RX ADMIN — QUETIAPINE FUMARATE 50 MG: 25 TABLET ORAL at 08:45

## 2021-09-20 RX ADMIN — PREGABALIN 100 MG: 100 CAPSULE ORAL at 16:57

## 2021-09-20 NOTE — PLAN OF CARE
Problem: OCCUPATIONAL THERAPY ADULT  Goal: Performs self-care activities at highest level of function for planned discharge setting  See evaluation for individualized goals  Description: Treatment Interventions: ADL retraining, Functional transfer training, Endurance training, Cognitive reorientation, Patient/family training, Compensatory technique education, Continued evaluation          See flowsheet documentation for full assessment, interventions and recommendations  Outcome: Adequate for Discharge  Note: Limitation: Decreased ADL status, Decreased endurance, Decreased cognition, Decreased self-care trans, Decreased high-level ADLs, Decreased Safe judgement during ADL  Prognosis: Good  Assessment: Patient participated in Skilled OT session this date with interventions consisting of ADL re training with the use of correct body mechnaics, Energy Conservation techniques, deep breathing technique, safety awareness and fall prevention techniques and therapeutic exercise to: increase functional use of BUEs, increase BUE muscle strength    Upon arrival patient was found supine in bed  Pt demonstrated the following tasks:  MI sup <> sit, STS  S fnxl ambulation with rollator and toileting transfer  Pt performs UBD at MI level, S for toileting, and MIN A for dressing (has difficulty donning sock on distal R foot- - states this is always her 'hard'/'bad' leg)  Pt also engaged in UE therex using light resistance theraband in seated position  Pt has no further acute OT needs at this time   From OT standpoint, recommendation at time of d/c would be home with skilled therapy and 24/7 supervision (scored 3 6 on ACLS indicating this need) vs LTC placement  Pt was left in bed after session with all current needs met  The patient's raw score on the AM-PAC Daily Activity inpatient short form is 21, standardized score is 44 27, greater than 39 4  Patients at this level are likely to benefit from discharge to home   Please refer to the recommendation of the Occupational Therapist for safe discharge planning       OT Discharge Recommendation: Home with home health rehabilitation (+ 24/7 supervision vs LTC placement )  OT - OK to Discharge: Yes (when medically stable with appropriate supervision )

## 2021-09-20 NOTE — OCCUPATIONAL THERAPY NOTE
Occupational Therapy Progress Note     Patient Name: Brandon Pineda  [id number] Date: 9/20/2021  Problem List  Principal Problem:    Ambulatory dysfunction  Active Problems:    Chronic pain syndrome    Esophageal reflux    Generalized anxiety disorder    Tardive dyskinesia    History of CVA (cerebrovascular accident)    Mixed hyperlipidemia            09/20/21 0950   OT Last Visit   OT Visit Date 09/20/21   Note Type   Note Type Treatment   Restrictions/Precautions   Weight Bearing Precautions Per Order No   Other Precautions Cognitive   General   Response to Previous Treatment Patient with no complaints from previous session   Lifestyle   Autonomy pta pt reports son assists w/ ADLs/IADLs  pt reports using rollator for functional mobility PTA   Reciprocal Relationships supportive son- reports son is going back to work shortly and she will be home alone   Service to Others not currentlying working   Intrinsic Gratification enjoys watching tv   Pain Assessment   Pain Assessment Tool 0-10   Pain Score No Pain   ADL   Where Assessed Chair   UB Dressing Assistance 7  Independent   UB Dressing Deficit Thread RUE; Thread LUE;Pull over head   LB Dressing Assistance 4  Minimal Assistance   LB Dressing Comments Pt able to doff B/L socks and don L sock w/o assistance, requires minimal assist to place sock over R toes    Toileting Assistance  5  Supervision/Setup   Toileting Comments Distant supervision    Bed Mobility   Supine to Sit 6  Modified independent   Additional items Bedrails; Increased time required   Sit to Supine 6  Modified independent   Additional items Increased time required   Transfers   Sit to Stand 6  Modified independent   Additional items Assist x 1; Increased time required   Stand to Sit 6  Modified independent   Additional items Assist x 1; Increased time required   Toilet transfer 5  Supervision   Additional items Assist x 1; Increased time required  (distant supervision )   Additional Comments Transfers with rollator    Functional Mobility   Functional Mobility 5  Supervision   Additional Comments rollator   Therapeutic Excerise-Strength   UE Strength Yes   Right Upper Extremity- Strength   R Shoulder Flexion;ABduction;Horizontal ABduction; Extension   R Elbow Elbow flexion;Elbow extension   R Position Seated   Equipment Theraband   R Weight/Reps/Sets x30   Left Upper Extremity-Strength   L Shoulder Flexion;ABduction; Extension;Horizontal ABduction   L Elbow Elbow flexion;Elbow extension   L Position Seated   Equipment Theraband   L Weights/Reps/Sets x30   Cognition   Arousal/Participation Alert; Responsive; Cooperative   Attention Attends with cues to redirect   Orientation Level Oriented X4   Following Commands Follows one step commands with increased time or repetition   Comments Pt pleasant and cooperative t/o session  At times, does require encouragement to complete ADL tasks    Cognition Assessment Tools ACLS   Score 3 6   Activity Tolerance   Activity Tolerance Patient limited by fatigue   Medical Staff Made Aware RN clearance for session    Assessment   Assessment Patient participated in Skilled OT session this date with interventions consisting of ADL re training with the use of correct body mechnaics, Energy Conservation techniques, deep breathing technique, safety awareness and fall prevention techniques and therapeutic exercise to: increase functional use of BUEs, increase BUE muscle strength    Upon arrival patient was found supine in bed  Pt demonstrated the following tasks:  MI sup <> sit, STS  S fnxl ambulation with rollator and toileting transfer  Pt performs UBD at MI level, S for toileting, and MIN A for dressing (has difficulty donning sock on distal R foot- - states this is always her 'hard'/'bad' leg)  Pt also engaged in UE therex using light resistance theraband in seated position  Pt has no further acute OT needs at this time   From OT standpoint, recommendation at time of d/c would be home with skilled therapy and 24/7 supervision (scored 3 6 on ACLS indicating this need) vs LTC placement  Pt was left in bed after session with all current needs met  The patient's raw score on the AM-PAC Daily Activity inpatient short form is 21, standardized score is 44 27, greater than 39 4  Patients at this level are likely to benefit from discharge to home  Please refer to the recommendation of the Occupational Therapist for safe discharge planning  Plan   Treatment Interventions ADL retraining;UE strengthening/ROM; Functional transfer training;Cognitive reorientation;Patient/family training;Equipment evaluation/education; Compensatory technique education;Continued evaluation; Energy conservation; Activityengagement   Goal Expiration Date 09/28/21   Recommendation   OT Discharge Recommendation Home with home health rehabilitation  (+ 24/7 supervision vs LTC placement )   OT - OK to Discharge Yes  (when medically stable with appropriate supervision )   AM-PAC Daily Activity Inpatient   Lower Body Dressing 3   Bathing 3   Toileting 3   Upper Body Dressing 4   Grooming 4   Eating 4   Daily Activity Raw Score 21   Daily Activity Standardized Score (Calc for Raw Score >=11) 44 27   AM-PAC Applied Cognition Inpatient   Following a Speech/Presentation 3   Understanding Ordinary Conversation 4   Taking Medications 3   Remembering Where Things Are Placed or Put Away 3   Remembering List of 4-5 Errands 2   Taking Care of Complicated Tasks 2   Applied Cognition Raw Score 17   Applied Cognition Standardized Score 36 52        Vanessa Brandon MS, OTR/L

## 2021-09-20 NOTE — CASE MANAGEMENT
[facility], [place], advised the SNF admission to [facility] has been approved on appeal   [employer] [id number], NRD 9/24  Questioned if this Vas[facility] Terry could be utilized for another facility, stated no, pt was approved for [facility]  If choose another facility to request auth for that facility  Questioned if facility was in their network, stated no but she is approved to go there  Asked her to have care coordinator call me  Questioned if they received clinical as we attempted to send on 3 different days and they did not go through  She will check with clinical coordinator    TCT Daria Liu, notified pt approved for admission  He will re-review and advise if they can accept    [facility] accepted [facility] admission on Friday    [facility], they will not accept pt  PT notified    Radha Larson requesting auth for admission    They can accept Friday, Will submit tomorrow

## 2021-09-20 NOTE — PROGRESS NOTES
INTERNAL MEDICINE RESIDENCY PROGRESS NOTE     Name: Jamir Zelaya   Age & Sex: 60 y o  female   MRN: [id number]  Unit/Bed#: [facility] 908-01   Encounter: [id number]  Team: SOD Team A    PATIENT INFORMATION     Name: Jamir Zelaya   Age & Sex: 60 y o  female   MRN: [id number]  Hospital Stay Days: 32    ASSESSMENT/PLAN     Principal Problem:    Ambulatory dysfunction  Active Problems:    Chronic pain syndrome    Esophageal reflux    Generalized anxiety disorder    Tardive dyskinesia    History of CVA (cerebrovascular accident)    Mixed hyperlipidemia      Mixed hyperlipidemia  Assessment & Plan  -On Atorvastatin 40 mg PO qd    History of CVA (cerebrovascular accident)  Assessment & Plan  -On Aspirin 81 mg PO qd  -On Atorvastatin 40 mg PO qd    Tardive dyskinesia  Assessment & Plan  -Continue Valbenazine 80 mg daily  -appears to have slightly improved with addition of Lyrica 100 mg t i d     Generalized anxiety disorder  Assessment & Plan  Patient is on multiple psychiatric medications and was recently evaluated by inpatient psych at [facility] with adjustments to her medications including buspirone 50 mg t i d , paroxetine 60 mg daily, quetiapine 50 mg 4 times daily, mirtazapine 7 5 mg q h s , lithium 300 mg q h s  And hydroxyzine 50 mg t i d  P r n  Oanh Rm Patient continues to have increased anxiety with daily morning panic attacks  Patient also reports that her anxiety is worsened by her lack of pain control  Plan:  · Continue medical management  · Continue current psychotropic medication   · She is psychiatrically cleared to go to SNF  · Patient could benefit from outpatient psychiatric evaluation and follow-up  · Confirmed psych medication regimen following discharge from [address] [place] [address] as noted above  Esophageal reflux  Assessment & Plan  Continue Protonix 20 mg daily  Chronic pain syndrome  Assessment & Plan  Patient has been on chronic opioids for low back and leg pain    She was seen in the clinic and discussed opioid tapering  Most recent opioid prescription per PDMP was for morphine 15 mg for 10 days (20 tablets)  Per chart review, concern for polypharmacy and suspicion of drug-seeking  · On Tylenol 650 mg q8h  · Lidocaine patch/Voltaren gel  · On Lyrica 100 mg  T i d    · Oxy IR 2 5 mg q6h prn  · Bengay topical ointment 4 times daily PRN  ·  Added Aqua K    ·  Added Robaxin  500 mg q 6h p r n     * Ambulatory dysfunction  Assessment & Plan  Patient with decreased physical mobility, at risk for falls  She uses walker and wheelchair at home  Presented to the ED requesting placement to SNF  · Encourage good body mechanics and assist with transfers  · Keep personal items and call bell close to the patient  · PT/OT recommendations- Home with home health rehabilitation  Cough-resolved as of 2021  Assessment & Plan  Patient complains of progressive worsening cough from  to   Cough is nonproductive  Patient also reports associated chills, nasal congestion, and nausea  -COVID PCR negative  -chest x-ray unremarkable  -Robitussin for cough  -continue to trend white count and fevers    -resolved as of         Disposition: Patient is medically stable  We are working with case management to locate a suitable rehab site for this patient  Spoke with patient about getting vaccinated for covid, Patient is not interested at this time in getting covid vaccine  SUBJECTIVE     Patient seen and examined  No acute events overnight  Patient reports feeling well and has no concerns at this time      OBJECTIVE     Vitals:    21 0609 21 1319 21 1531 21 2145   BP: 115/68 114/67 114/71 114/72   Pulse: 66 65  63   Resp:    16   Temp:    97 9 °F (36 6 °C)   TempSrc:    Oral   SpO2:    98%   Weight:       Height:          Temperature:   Temp (24hrs), Av 9 °F (36 6 °C), Min:97 9 °F (36 6 °C), Max:97 9 °F (36 6 °C)    Temperature: 97 9 °F (36 6 °C)  Intake & Output:  I/O       09/18 0701 - 09/19 0700 09/19 0701 - 09/20 0700    P  O  1020 240    Total Intake(mL/kg) 1020 (11 2) 240 (2 6)    Net +1020 +240          Unmeasured Urine Occurrence 1 x         Weights:   IBW (Ideal Body Weight): 47 8 kg    Body mass index is 37 99 kg/m²  Weight (last 2 days)     None        Physical Exam  HENT:      Head: Normocephalic and atraumatic  Cardiovascular:      Rate and Rhythm: Normal rate and regular rhythm  Pulses: Normal pulses  Heart sounds: Normal heart sounds  Pulmonary:      Effort: Pulmonary effort is normal       Breath sounds: Normal breath sounds  Abdominal:      General: Bowel sounds are normal       Palpations: Abdomen is soft  Skin:     General: Skin is warm and dry  Capillary Refill: Capillary refill takes less than 2 seconds  Neurological:      Mental Status: She is oriented to person, place, and time  Mental status is at baseline  Psychiatric:         Mood and Affect: Mood normal        LABORATORY DATA     Labs: I have personally reviewed pertinent reports  Results from last 7 days   Lab Units 09/13/21  0615   WBC Thousand/uL 4 03*   HEMOGLOBIN g/dL 13 4   HEMATOCRIT % 42 0   PLATELETS Thousands/uL 277   NEUTROS PCT % 60   MONOS PCT % 9          Invalid input(s): LABALBU                         IMAGING & DIAGNOSTIC TESTING     Radiology Results: I have personally reviewed pertinent reports  No results found  Other Diagnostic Testing: I have personally reviewed pertinent reports      ACTIVE MEDICATIONS     Current Facility-Administered Medications   Medication Dose Route Frequency    acetaminophen (TYLENOL) tablet 650 mg  650 mg Oral Q8H [facility]    amLODIPine (NORVASC) tablet 5 mg  5 mg Oral Daily    ascorbic acid (VITAMIN C) tablet 500 mg  500 mg Oral BID    aspirin chewable tablet 81 mg  81 mg Oral Daily    atorvastatin (LIPITOR) tablet 40 mg  40 mg Oral Daily With Dinner    busPIRone (BUSPAR) tablet 15 mg  15 mg Oral TID  dextromethorphan-guaiFENesin (ROBITUSSIN DM) oral syrup 10 mL  10 mL Oral Q4H PRN    Diclofenac Sodium (VOLTAREN) 1 % topical gel 2 g  2 g Topical 4x Daily    enoxaparin (LOVENOX) subcutaneous injection 40 mg  40 mg Subcutaneous Daily    ferrous sulfate tablet 325 mg  325 mg Oral Daily With Breakfast    folic acid (FOLVITE) tablet 1,000 mcg  1,000 mcg Oral Daily    hydrOXYzine HCL (ATARAX) tablet 50 mg  50 mg Oral TID PRN    lidocaine (LIDODERM) 5 % patch 1 patch  1 patch Topical Daily    lidocaine (LIDODERM) 5 % patch 1 patch  1 patch Topical Daily    lithium carbonate (LITHOBID) CR tablet 300 mg  300 mg Oral HS    LORazepam (ATIVAN) tablet 0 5 mg  0 5 mg Oral Q8H PRN    menthol-methyl salicylate (BENGAY) 87-34 % cream   Apply externally 4x Daily PRN    methocarbamol (ROBAXIN) tablet 500 mg  500 mg Oral Q6H PRN    mirtazapine (REMERON) tablet 7 5 mg  7 5 mg Oral HS PRN    ondansetron (ZOFRAN-ODT) dispersible tablet 4 mg  4 mg Oral Q6H PRN    oxyCODONE-acetaminophen (PERCOCET) 5-325 mg per tablet 1 tablet  1 tablet Oral Q6H PRN    pantoprazole (PROTONIX) EC tablet 20 mg  20 mg Oral Early Morning    PARoxetine (PAXIL) tablet 60 mg  60 mg Oral Daily    polyethylene glycol (MIRALAX) packet 17 g  17 g Oral Daily    prazosin (MINIPRESS) capsule 2 mg  2 mg Oral Daily    pregabalin (LYRICA) capsule 100 mg  100 mg Oral TID    propranolol (INDERAL) tablet 20 mg  20 mg Oral BID before breakfast/lunch    QUEtiapine (SEROquel) tablet 50 mg  50 mg Oral 4x Daily (PC & HS)    senna-docusate sodium (SENOKOT S) 8 6-50 mg per tablet 1 tablet  1 tablet Oral Daily PRN    Valbenazine Tosylate CAPS 80 mg  80 mg Oral Daily       VTE Pharmacologic Prophylaxis: Enoxaparin (Lovenox)  VTE Mechanical Prophylaxis: sequential compression device    Portions of the record may have been created with voice recognition software    Occasional wrong word or "sound a like" substitutions may have occurred due to the inherent limitations of voice recognition software    Read the chart carefully and recognize, using context, where substitutions have occurred   ==  Portillo Tello MD  [address]  Internal Medicine Residency PGY-1

## 2021-09-20 NOTE — CASE MANAGEMENT
TCF OPCM re: patients discharge to shelter  Feels patient will be readmitted and is not sure this is appropriate place for member  Suggested SNF and was advised we have placed a large amount of referrals with no accepting facility  I have spoken with son re: difficulty placing patient and he and his sister are unable to provide care for pt    She suggested outreach to [facility] [address] for assistance    PEDRO Read, [phone] requesting call back    [facility], pedro left requesting a return call    Additional referrals sent via [address] [place] [address] 48 mile radius    TCT son, Trinidad Adrian, request call back

## 2021-09-20 NOTE — CASE MANAGEMENT
MARY Grant  Follow up on DCP and approval for [facility]  [facility] has not accepted pt  [facility] will accept on Friday  Inquired if auth would need to be done again and if denied, appealed  If yes, will submit tomorrow  She will discuss with her supervisor and call back    TCF Mehran Better, [address] CM  He would be involved with placement for psyche not medical   He will check with Wilder Navarro to see if he has any suggestions for placement  Jenae Love made an ICM referral to [facility]    Advised I would call back if pt does not go to [facility]

## 2021-09-21 PROCEDURE — 97530 THERAPEUTIC ACTIVITIES: CPT

## 2021-09-21 PROCEDURE — 97116 GAIT TRAINING THERAPY: CPT

## 2021-09-21 PROCEDURE — 99232 SBSQ HOSP IP/OBS MODERATE 35: CPT | Performed by: INTERNAL MEDICINE

## 2021-09-21 RX ORDER — VALBENAZINE 80 MG/1
80 CAPSULE ORAL DAILY
Qty: 90 CAPSULE | Refills: 0 | Status: SHIPPED | OUTPATIENT
Start: 2021-09-21 | End: 2022-06-03 | Stop reason: ALTCHOICE

## 2021-09-21 RX ADMIN — PRAZOSIN HYDROCHLORIDE 2 MG: 2 CAPSULE ORAL at 08:51

## 2021-09-21 RX ADMIN — PREGABALIN 100 MG: 100 CAPSULE ORAL at 17:31

## 2021-09-21 RX ADMIN — PROPRANOLOL HYDROCHLORIDE 20 MG: 20 TABLET ORAL at 07:18

## 2021-09-21 RX ADMIN — HYDROXYZINE HYDROCHLORIDE 50 MG: 25 TABLET, FILM COATED ORAL at 14:20

## 2021-09-21 RX ADMIN — OXYCODONE HYDROCHLORIDE AND ACETAMINOPHEN 1 TABLET: 5; 325 TABLET ORAL at 08:52

## 2021-09-21 RX ADMIN — ACETAMINOPHEN 650 MG: 325 TABLET, FILM COATED ORAL at 14:19

## 2021-09-21 RX ADMIN — QUETIAPINE FUMARATE 50 MG: 25 TABLET ORAL at 20:49

## 2021-09-21 RX ADMIN — BUSPIRONE HYDROCHLORIDE 15 MG: 10 TABLET ORAL at 20:49

## 2021-09-21 RX ADMIN — AMLODIPINE BESYLATE 5 MG: 5 TABLET ORAL at 08:51

## 2021-09-21 RX ADMIN — PANTOPRAZOLE SODIUM 20 MG: 20 TABLET, DELAYED RELEASE ORAL at 07:21

## 2021-09-21 RX ADMIN — DICLOFENAC SODIUM 2 G: 10 GEL TOPICAL at 17:30

## 2021-09-21 RX ADMIN — DICLOFENAC SODIUM 2 G: 10 GEL TOPICAL at 11:04

## 2021-09-21 RX ADMIN — ACETAMINOPHEN 650 MG: 325 TABLET, FILM COATED ORAL at 20:48

## 2021-09-21 RX ADMIN — OXYCODONE HYDROCHLORIDE AND ACETAMINOPHEN 500 MG: 500 TABLET ORAL at 08:50

## 2021-09-21 RX ADMIN — FERROUS SULFATE TAB 325 MG (65 MG ELEMENTAL FE) 325 MG: 325 (65 FE) TAB at 07:21

## 2021-09-21 RX ADMIN — FOLIC ACID 1000 MCG: 1 TABLET ORAL at 08:50

## 2021-09-21 RX ADMIN — LORAZEPAM 0.5 MG: 0.5 TABLET ORAL at 17:32

## 2021-09-21 RX ADMIN — OXYCODONE HYDROCHLORIDE AND ACETAMINOPHEN 1 TABLET: 5; 325 TABLET ORAL at 03:24

## 2021-09-21 RX ADMIN — PAROXETINE HYDROCHLORIDE 60 MG: 20 TABLET, FILM COATED ORAL at 20:48

## 2021-09-21 RX ADMIN — OXYCODONE HYDROCHLORIDE AND ACETAMINOPHEN 1 TABLET: 5; 325 TABLET ORAL at 14:19

## 2021-09-21 RX ADMIN — QUETIAPINE FUMARATE 50 MG: 25 TABLET ORAL at 11:03

## 2021-09-21 RX ADMIN — BUSPIRONE HYDROCHLORIDE 15 MG: 10 TABLET ORAL at 08:50

## 2021-09-21 RX ADMIN — DICLOFENAC SODIUM 2 G: 10 GEL TOPICAL at 20:49

## 2021-09-21 RX ADMIN — PROPRANOLOL HYDROCHLORIDE 20 MG: 20 TABLET ORAL at 11:03

## 2021-09-21 RX ADMIN — QUETIAPINE FUMARATE 50 MG: 25 TABLET ORAL at 07:22

## 2021-09-21 RX ADMIN — LITHIUM CARBONATE 300 MG: 300 TABLET, FILM COATED, EXTENDED RELEASE ORAL at 20:48

## 2021-09-21 RX ADMIN — ENOXAPARIN SODIUM 40 MG: 40 INJECTION SUBCUTANEOUS at 08:50

## 2021-09-21 RX ADMIN — BUSPIRONE HYDROCHLORIDE 15 MG: 10 TABLET ORAL at 17:31

## 2021-09-21 RX ADMIN — OXYCODONE HYDROCHLORIDE AND ACETAMINOPHEN 1 TABLET: 5; 325 TABLET ORAL at 20:48

## 2021-09-21 RX ADMIN — OXYCODONE HYDROCHLORIDE AND ACETAMINOPHEN 500 MG: 500 TABLET ORAL at 17:31

## 2021-09-21 RX ADMIN — METHOCARBAMOL 500 MG: 500 TABLET, FILM COATED ORAL at 03:24

## 2021-09-21 RX ADMIN — DICLOFENAC SODIUM 2 G: 10 GEL TOPICAL at 08:53

## 2021-09-21 RX ADMIN — ATORVASTATIN CALCIUM 40 MG: 40 TABLET, FILM COATED ORAL at 17:31

## 2021-09-21 RX ADMIN — METHOCARBAMOL 500 MG: 500 TABLET, FILM COATED ORAL at 17:32

## 2021-09-21 RX ADMIN — PREGABALIN 100 MG: 100 CAPSULE ORAL at 08:50

## 2021-09-21 RX ADMIN — HYDROXYZINE HYDROCHLORIDE 50 MG: 25 TABLET, FILM COATED ORAL at 22:34

## 2021-09-21 RX ADMIN — HYDROXYZINE HYDROCHLORIDE 50 MG: 25 TABLET, FILM COATED ORAL at 07:18

## 2021-09-21 RX ADMIN — PREGABALIN 100 MG: 100 CAPSULE ORAL at 20:48

## 2021-09-21 RX ADMIN — ASPIRIN 81 MG CHEWABLE TABLET 81 MG: 81 TABLET CHEWABLE at 08:50

## 2021-09-21 RX ADMIN — LORAZEPAM 0.5 MG: 0.5 TABLET ORAL at 07:18

## 2021-09-21 RX ADMIN — QUETIAPINE FUMARATE 50 MG: 25 TABLET ORAL at 17:31

## 2021-09-21 RX ADMIN — ACETAMINOPHEN 650 MG: 325 TABLET, FILM COATED ORAL at 07:21

## 2021-09-21 NOTE — PLAN OF CARE
Problem: PHYSICAL THERAPY ADULT  Goal: Performs mobility at highest level of function for planned discharge setting  See evaluation for individualized goals  Description: Treatment/Interventions: Functional transfer training, LE strengthening/ROM, Elevations, Therapeutic exercise, Endurance training, Patient/family training, Equipment eval/education, Bed mobility, Gait training, Spoke to nursing, OT  Equipment Recommended: Oksana Medina       See flowsheet documentation for full assessment, interventions and recommendations  Outcome: Progressing  Note: Prognosis: Fair  Problem List: Decreased endurance, Decreased mobility, Pain, Impaired balance  Assessment: Pt seen for PT treatment session this date  Therapy session focused on bed mobility, functional transfers, gait training, stair training, and endurance training in order to improve overall mobility and independence  Pt requires assist of 1 for all mobility performed this date  Pt performed bed mobility tasks with S level  Pt performed multiple STS from various surfaces at mod I level to RW  Pt ambulated increased distances this date with RW at S level; cues for upright posture required  Pt negotiated 3 steps utilizing L HR with min A  Pt still limited by increased fatigue and decreased endurance, requiring rest breaks after all mobility tasks  Pt making progress toward goals  Pt was left sitting upright in bedside chair at the end of PT session with all needs in reach  Pt would benefit from continued PT services while in hospital to address remaining limitations  PT to continue to follow pt and recommends home with HHPT and increased social support vs LTC (please also refer to OT recs)  The patient's AM-PAC Basic Mobility Inpatient Short Form Raw Score is 20, Standardized Score is 43 99  A standardized score greater than 42 9 suggests the patient may benefit from discharge to home   Please also refer to the recommendation of the Physical Therapist for safe discharge planning  Barriers to Discharge: Decreased caregiver support  Barriers to Discharge Comments: increased ambulation, son is not expected to be home all day upon discharge     PT Discharge Recommendation: Home with home health rehabilitation (+ increased social support vs LTC; also see OT recs )     PT - OK to Discharge: Yes (once medically cleared )    See flowsheet documentation for full assessment

## 2021-09-21 NOTE — PHYSICAL THERAPY NOTE
PHYSICAL THERAPY NOTE          Patient Name: Racquel GUERRAPV'D Date: 9/21/2021 09/21/21 1359   PT Last Visit   PT Visit Date 09/21/21   Note Type   Note Type Treatment   Pain Assessment   Pain Assessment Tool 0-10   Pain Score 8   Pain Location/Orientation Location: Back   Patient's Stated Pain Goal No pain   Hospital Pain Intervention(s) Repositioned; Ambulation/increased activity   Restrictions/Precautions   Weight Bearing Precautions Per Order No   Other Precautions Cognitive; Fall Risk;Pain   General   Chart Reviewed Yes   Response to Previous Treatment Patient with no complaints from previous session  Family/Caregiver Present No   Cognition   Overall Cognitive Status Impaired   Arousal/Participation Alert; Responsive; Cooperative   Attention Attends with cues to redirect   Orientation Level Oriented X4   Memory Decreased recall of precautions;Decreased recall of recent events   Following Commands Follows one step commands with increased time or repetition   Comments Pt pleasant and cooperative to participate in therapy session; requires some encouragement  Bed Mobility   Supine to Sit 5  Supervision   Additional items Increased time required; Bedrails   Sit to Supine Unable to assess   Additional Comments Pt supine in bed upon arrival  Pt sitting upright in bedside chair with all needs within reach at the end of therapy session  Transfers   Sit to Stand 6  Modified independent   Additional items Armrests; Increased time required   Stand to Sit 6  Modified independent   Additional items Armrests; Increased time required   Additional Comments Transfers with rollator   Ambulation/Elevation   Gait pattern Excessively slow; Short stride; Foward flexed   Gait Assistance 5  Supervision   Additional items Assist x 1;Verbal cues  (upright posture )   Assistive Device 4-wheeled walker   Distance 2 x 50ft   (4x sitting rest break throughout gait trials )   Stair Management Assistance 4  Minimal assist   Additional items Assist x 1;Verbal cues   Stair Management Technique One rail L;Step to pattern; Foreward;Nonreciprocal  (b/l UEs on L HR )   Number of Stairs 3   Balance   Static Sitting Fair +   Dynamic Sitting Fair   Static Standing Fair   Dynamic Standing Fair   Ambulatory Fair -   Activity Tolerance   Activity Tolerance Patient tolerated treatment well   Nurse Made Aware RN cleared pt to participate in therapy session  Assessment   Prognosis Fair   Problem List Decreased endurance;Decreased mobility;Pain; Impaired balance   Assessment Pt seen for PT treatment session this date  Therapy session focused on bed mobility, functional transfers, gait training, stair training, and endurance training in order to improve overall mobility and independence  Pt requires assist of 1 for all mobility performed this date  Pt performed bed mobility tasks with S level  Pt performed multiple STS from various surfaces at mod I level to RW  Pt ambulated increased distances this date with RW at S level; cues for upright posture required  Pt negotiated 3 steps utilizing L HR with min A  Pt still limited by increased fatigue and decreased endurance, requiring rest breaks after all mobility tasks  Pt making progress toward goals  Pt was left sitting upright in bedside chair at the end of PT session with all needs in reach  Pt would benefit from continued PT services while in hospital to address remaining limitations  PT to continue to follow pt and recommends home with HHPT and increased social support vs LTC (please also refer to OT recs)  The patient's AM-PAC Basic Mobility Inpatient Short Form Raw Score is 20, Standardized Score is 43 99  A standardized score greater than 42 9 suggests the patient may benefit from discharge to home  Please also refer to the recommendation of the Physical Therapist for safe discharge planning     Barriers to Discharge Decreased caregiver support   Goals   Patient Goals none stated    STG Expiration Date 09/28/21   PT Treatment Day 9   Plan   Treatment/Interventions Functional transfer training;LE strengthening/ROM; Elevations; Endurance training;Patient/family training;Equipment eval/education; Bed mobility;Gait training;Spoke to nursing;Spoke to case management   Progress Progressing toward goals   PT Frequency 2-3x/wk   Recommendation   PT Discharge Recommendation Home with home health rehabilitation  (+ increased social support vs LTC; also see OT recs )   Equipment Recommended [facility] Recommended Wheeled walker   PT - OK to Discharge Yes  (once medically cleared )   AM-PAC Basic Mobility Inpatient   Turning in Bed Without Bedrails 4   Lying on Back to Sitting on Edge of Flat Bed 3   Moving Bed to Chair 3   Standing Up From Chair 4   Walk in Room 3   Climb 3-5 Stairs 3   Basic Mobility Inpatient Raw Score 20   Basic Mobility Standardized Score 43 99     Soraida Sky, PT, DPT

## 2021-09-21 NOTE — CASE MANAGEMENT
TCT Ludwig Done, she has not received decision on whether we need to resubmit for new SNF  She will check with manager and advise  (PT was approved for SNF on appeal by [employer], that facility declined pt  Request is to transfer auth to new facility)    TCF son stated his mom is on [place] which she brought to hospital and is being provided  She needs a refill  Son called her psyche doctor who advised him to have a psyche consult done and prescription provided  Notified Dr Dahiana Romero    Son notified that [facility] has accepted pt for admit on Friday  We are working with insurance [employer] on [place], request I send her list of 10 facilities in 25 mile radius that did not accept pt  They will approve [facility] for admission on 9/24, but pt needs to be admitted 9/24  This is due [employer] providing auth through 9/24    Brando [email or web address]  List sent    Message via [employer] to [facility] with approval and auth # [id number],     Message [facility], pt will need COVID test    PTS son notified and pt aware [facility] was approved and DC Friday      Notified by MD Jacobson script was called in to [facility]   Notified pt and son  Advised son to provide med to patients nurse Raeann Hernandez    Nurse notified as well

## 2021-09-21 NOTE — PROGRESS NOTES
INTERNAL MEDICINE RESIDENCY PROGRESS NOTE     Name: Aashish Gonzalez   Age & Sex: 60 y o  female   MRN: [id number]  [facility]   Encounter: [id number]  Team: SOD Team A    PATIENT INFORMATION     Name: Aashish Gonzalez   Age & Sex: 60 y o  female   MRN: [id number]  Hospital Stay Days: 28    ASSESSMENT/PLAN     Principal Problem:    Ambulatory dysfunction  Active Problems:    Chronic pain syndrome    Esophageal reflux    Generalized anxiety disorder    Tardive dyskinesia    History of CVA (cerebrovascular accident)    Mixed hyperlipidemia      Mixed hyperlipidemia  Assessment & Plan  -On Atorvastatin 40 mg PO qd    History of CVA (cerebrovascular accident)  Assessment & Plan  -On Aspirin 81 mg PO qd  -On Atorvastatin 40 mg PO qd    Tardive dyskinesia  Assessment & Plan  -Continue Valbenazine 80 mg daily  -appears to have slightly improved with addition of Lyrica 100 mg t i d     Generalized anxiety disorder  Assessment & Plan  Patient is on multiple psychiatric medications and was recently evaluated by inpatient psych at [address] [place] [address] with adjustments to her medications including buspirone 50 mg t i d , paroxetine 60 mg daily, quetiapine 50 mg 4 times daily, mirtazapine 7 5 mg q h s , lithium 300 mg q h s  And hydroxyzine 50 mg t i d  P r n  [facility] Patient continues to have increased anxiety with daily morning panic attacks  Patient also reports that her anxiety is worsened by her lack of pain control  Plan:  · Continue medical management  · Continue current psychotropic medication   · She is psychiatrically cleared to go to SNF  · Patient could benefit from outpatient psychiatric evaluation and follow-up  · Confirmed psych medication regimen following discharge from [address] [place] [address] as noted above  Esophageal reflux  Assessment & Plan  Continue Protonix 20 mg daily  Chronic pain syndrome  Assessment & Plan  Patient has been on chronic opioids for low back and leg pain    She was seen in the clinic and discussed opioid tapering  Most recent opioid prescription per PDMP was for morphine 15 mg for 10 days (20 tablets)  Per chart review, concern for polypharmacy and suspicion of drug-seeking  · On Tylenol 650 mg q8h  · Lidocaine patch/Voltaren gel  · On Lyrica 100 mg  T i d    · On Percocet 5-325 mg q6h prn  · Bengay topical ointment 4 times daily PRN  ·  Added Aqua K    ·  Added Robaxin  500 mg q 6h p r n     * Ambulatory dysfunction  Assessment & Plan  Patient with decreased physical mobility, at risk for falls  She uses walker and wheelchair at home  Presented to the ED requesting placement to SNF  · Encourage good body mechanics and assist with transfers  · Keep personal items and call bell close to the patient  · PT/OT recommendations- Home with home health rehabilitation  Cough-resolved as of 2021  Assessment & Plan  Patient complains of progressive worsening cough from  to   Cough is nonproductive  Patient also reports associated chills, nasal congestion, and nausea  -COVID PCR negative  -chest x-ray unremarkable  -Robitussin for cough  -continue to trend white count and fevers    -resolved as of       Disposition: Patient is medically stable  We are working with case management to locate a suitable rehab site for this patient  Spoke with patient about getting vaccinated for covid, patient is not interested at this time in getting covid vaccine  SUBJECTIVE     Patient seen and examined  No acute events overnight  She reports felling well and has no concerns at this time       OBJECTIVE     Vitals:    21 1448 21 2300 21 0730   BP: 133/86  95/57 112/66   BP Location:       Pulse: 63  58 66   Resp: 16  18 18   Temp: 97 9 °F (36 6 °C)  98 1 °F (36 7 °C) 98 °F (36 7 °C)   TempSrc:       SpO2: 98% 97%  96%   Weight:       Height:          Temperature:   Temp (24hrs), Av °F (36 7 °C), Min:97 9 °F (36 6 °C), Max:98 1 °F (36 7 °C)    Temperature: 98 °F (36 7 °C)  Intake & Output:  I/O       09/19 0701 - 09/20 0700 09/20 0701 - 09/21 0700 09/21 0701 - 09/22 0700    P  O  240 720     Total Intake(mL/kg) 240 (2 6) 720 (7 9)     Net +240 +720                Weights:   IBW (Ideal Body Weight): 47 8 kg    Body mass index is 37 99 kg/m²  Weight (last 2 days)     None        Physical Exam  HENT:      Head: Normocephalic and atraumatic  Cardiovascular:      Rate and Rhythm: Normal rate and regular rhythm  Pulses: Normal pulses  Heart sounds: Normal heart sounds  Pulmonary:      Effort: Pulmonary effort is normal       Breath sounds: Normal breath sounds  Abdominal:      General: Bowel sounds are normal       Palpations: Abdomen is soft  Skin:     General: Skin is warm and dry  Capillary Refill: Capillary refill takes less than 2 seconds  Neurological:      Mental Status: She is oriented to person, place, and time  Mental status is at baseline  LABORATORY DATA     Labs: I have personally reviewed pertinent reports  Results from last 7 days   Lab Units 09/20/21  0542   WBC Thousand/uL 4 05*   HEMOGLOBIN g/dL 12 6   HEMATOCRIT % 39 3   PLATELETS Thousands/uL 245   NEUTROS PCT % 50   MONOS PCT % 9      Results from last 7 days   Lab Units 09/20/21  0542   POTASSIUM mmol/L 3 5   CHLORIDE mmol/L 110*   CO2 mmol/L 31   BUN mg/dL 10   CREATININE mg/dL 0 71   CALCIUM mg/dL 8 4                            IMAGING & DIAGNOSTIC TESTING     Radiology Results: I have personally reviewed pertinent reports  No results found  Other Diagnostic Testing: I have personally reviewed pertinent reports      ACTIVE MEDICATIONS     Current Facility-Administered Medications   Medication Dose Route Frequency    acetaminophen (TYLENOL) tablet 650 mg  650 mg Oral Q8H [facility]    amLODIPine (NORVASC) tablet 5 mg  5 mg Oral Daily    ascorbic acid (VITAMIN C) tablet 500 mg  500 mg Oral BID    aspirin chewable tablet 81 mg  81 mg Oral Daily    atorvastatin (LIPITOR) tablet 40 mg  40 mg Oral Daily With Dinner    busPIRone (BUSPAR) tablet 15 mg  15 mg Oral TID    dextromethorphan-guaiFENesin (ROBITUSSIN DM) oral syrup 10 mL  10 mL Oral Q4H PRN    Diclofenac Sodium (VOLTAREN) 1 % topical gel 2 g  2 g Topical 4x Daily    enoxaparin (LOVENOX) subcutaneous injection 40 mg  40 mg Subcutaneous Daily    ferrous sulfate tablet 325 mg  325 mg Oral Daily With Breakfast    folic acid (FOLVITE) tablet 1,000 mcg  1,000 mcg Oral Daily    hydrOXYzine HCL (ATARAX) tablet 50 mg  50 mg Oral TID PRN    lidocaine (LIDODERM) 5 % patch 1 patch  1 patch Topical Daily    lidocaine (LIDODERM) 5 % patch 1 patch  1 patch Topical Daily    lithium carbonate (LITHOBID) CR tablet 300 mg  300 mg Oral HS    LORazepam (ATIVAN) tablet 0 5 mg  0 5 mg Oral Q8H PRN    menthol-methyl salicylate (BENGAY) 16-82 % cream   Apply externally 4x Daily PRN    methocarbamol (ROBAXIN) tablet 500 mg  500 mg Oral Q6H PRN    mirtazapine (REMERON) tablet 7 5 mg  7 5 mg Oral HS PRN    ondansetron (ZOFRAN-ODT) dispersible tablet 4 mg  4 mg Oral Q6H PRN    oxyCODONE-acetaminophen (PERCOCET) 5-325 mg per tablet 1 tablet  1 tablet Oral Q6H PRN    pantoprazole (PROTONIX) EC tablet 20 mg  20 mg Oral Early Morning    PARoxetine (PAXIL) tablet 60 mg  60 mg Oral Daily    polyethylene glycol (MIRALAX) packet 17 g  17 g Oral Daily    prazosin (MINIPRESS) capsule 2 mg  2 mg Oral Daily    pregabalin (LYRICA) capsule 100 mg  100 mg Oral TID    propranolol (INDERAL) tablet 20 mg  20 mg Oral BID before breakfast/lunch    QUEtiapine (SEROquel) tablet 50 mg  50 mg Oral 4x Daily (PC & HS)    senna-docusate sodium (SENOKOT S) 8 6-50 mg per tablet 1 tablet  1 tablet Oral Daily PRN    Valbenazine Tosylate CAPS 80 mg  80 mg Oral Daily       VTE Pharmacologic Prophylaxis: Enoxaparin (Lovenox)  VTE Mechanical Prophylaxis: sequential compression device    Portions of the record may have been created with voice recognition software  Occasional wrong word or "sound a like" substitutions may have occurred due to the inherent limitations of voice recognition software    Read the chart carefully and recognize, using context, where substitutions have occurred   ==  Megan Agee MD  [facility]  Internal Medicine Residency PGY-1

## 2021-09-22 PROCEDURE — 99232 SBSQ HOSP IP/OBS MODERATE 35: CPT | Performed by: INTERNAL MEDICINE

## 2021-09-22 RX ADMIN — HYDROXYZINE HYDROCHLORIDE 50 MG: 25 TABLET, FILM COATED ORAL at 17:14

## 2021-09-22 RX ADMIN — PROPRANOLOL HYDROCHLORIDE 20 MG: 20 TABLET ORAL at 08:47

## 2021-09-22 RX ADMIN — BUSPIRONE HYDROCHLORIDE 15 MG: 10 TABLET ORAL at 17:13

## 2021-09-22 RX ADMIN — ACETAMINOPHEN 650 MG: 325 TABLET, FILM COATED ORAL at 05:28

## 2021-09-22 RX ADMIN — PREGABALIN 100 MG: 100 CAPSULE ORAL at 08:45

## 2021-09-22 RX ADMIN — LITHIUM CARBONATE 300 MG: 300 TABLET, FILM COATED, EXTENDED RELEASE ORAL at 22:12

## 2021-09-22 RX ADMIN — QUETIAPINE FUMARATE 50 MG: 25 TABLET ORAL at 22:12

## 2021-09-22 RX ADMIN — OXYCODONE HYDROCHLORIDE AND ACETAMINOPHEN 1 TABLET: 5; 325 TABLET ORAL at 11:21

## 2021-09-22 RX ADMIN — DICLOFENAC SODIUM 2 G: 10 GEL TOPICAL at 11:21

## 2021-09-22 RX ADMIN — PREGABALIN 100 MG: 100 CAPSULE ORAL at 17:14

## 2021-09-22 RX ADMIN — LORAZEPAM 0.5 MG: 0.5 TABLET ORAL at 08:45

## 2021-09-22 RX ADMIN — QUETIAPINE FUMARATE 50 MG: 25 TABLET ORAL at 17:14

## 2021-09-22 RX ADMIN — ACETAMINOPHEN 650 MG: 325 TABLET, FILM COATED ORAL at 12:42

## 2021-09-22 RX ADMIN — BUSPIRONE HYDROCHLORIDE 15 MG: 10 TABLET ORAL at 22:12

## 2021-09-22 RX ADMIN — PAROXETINE HYDROCHLORIDE 60 MG: 20 TABLET, FILM COATED ORAL at 22:12

## 2021-09-22 RX ADMIN — OXYCODONE HYDROCHLORIDE AND ACETAMINOPHEN 1 TABLET: 5; 325 TABLET ORAL at 17:14

## 2021-09-22 RX ADMIN — PANTOPRAZOLE SODIUM 20 MG: 20 TABLET, DELAYED RELEASE ORAL at 05:28

## 2021-09-22 RX ADMIN — DICLOFENAC SODIUM 2 G: 10 GEL TOPICAL at 08:47

## 2021-09-22 RX ADMIN — ACETAMINOPHEN 650 MG: 325 TABLET, FILM COATED ORAL at 22:12

## 2021-09-22 RX ADMIN — PROPRANOLOL HYDROCHLORIDE 20 MG: 20 TABLET ORAL at 11:20

## 2021-09-22 RX ADMIN — OXYCODONE HYDROCHLORIDE AND ACETAMINOPHEN 500 MG: 500 TABLET ORAL at 17:13

## 2021-09-22 RX ADMIN — METHOCARBAMOL 500 MG: 500 TABLET, FILM COATED ORAL at 22:17

## 2021-09-22 RX ADMIN — PREGABALIN 100 MG: 100 CAPSULE ORAL at 22:11

## 2021-09-22 RX ADMIN — METHOCARBAMOL 500 MG: 500 TABLET, FILM COATED ORAL at 08:45

## 2021-09-22 RX ADMIN — FERROUS SULFATE TAB 325 MG (65 MG ELEMENTAL FE) 325 MG: 325 (65 FE) TAB at 08:45

## 2021-09-22 RX ADMIN — ATORVASTATIN CALCIUM 40 MG: 40 TABLET, FILM COATED ORAL at 17:14

## 2021-09-22 RX ADMIN — DICLOFENAC SODIUM 2 G: 10 GEL TOPICAL at 22:14

## 2021-09-22 RX ADMIN — BUSPIRONE HYDROCHLORIDE 15 MG: 10 TABLET ORAL at 08:46

## 2021-09-22 RX ADMIN — HYDROXYZINE HYDROCHLORIDE 50 MG: 25 TABLET, FILM COATED ORAL at 08:45

## 2021-09-22 RX ADMIN — ENOXAPARIN SODIUM 40 MG: 40 INJECTION SUBCUTANEOUS at 08:45

## 2021-09-22 RX ADMIN — FOLIC ACID 1000 MCG: 1 TABLET ORAL at 08:45

## 2021-09-22 RX ADMIN — ASPIRIN 81 MG CHEWABLE TABLET 81 MG: 81 TABLET CHEWABLE at 08:45

## 2021-09-22 RX ADMIN — QUETIAPINE FUMARATE 50 MG: 25 TABLET ORAL at 08:45

## 2021-09-22 RX ADMIN — LORAZEPAM 0.5 MG: 0.5 TABLET ORAL at 17:14

## 2021-09-22 RX ADMIN — MIRTAZAPINE 7.5 MG: 15 TABLET, FILM COATED ORAL at 22:17

## 2021-09-22 RX ADMIN — AMLODIPINE BESYLATE 5 MG: 5 TABLET ORAL at 08:46

## 2021-09-22 RX ADMIN — OXYCODONE HYDROCHLORIDE AND ACETAMINOPHEN 500 MG: 500 TABLET ORAL at 08:45

## 2021-09-22 RX ADMIN — PRAZOSIN HYDROCHLORIDE 2 MG: 2 CAPSULE ORAL at 08:47

## 2021-09-22 RX ADMIN — QUETIAPINE FUMARATE 50 MG: 25 TABLET ORAL at 11:20

## 2021-09-22 RX ADMIN — OXYCODONE HYDROCHLORIDE AND ACETAMINOPHEN 1 TABLET: 5; 325 TABLET ORAL at 05:34

## 2021-09-22 NOTE — PLAN OF CARE
Problem: Potential for Falls  Goal: Patient will remain free of falls  Description: INTERVENTIONS:  - Educate patient/family on patient safety including physical limitations  - Instruct patient to call for assistance with activity   - Consult OT/PT to assist with strengthening/mobility   - Keep Call bell within reach  - Keep bed low and locked with side rails adjusted as appropriate  - Keep care items and personal belongings within reach  - Initiate and maintain comfort rounds  - Make Fall Risk Sign visible to staff  - Offer Toileting every 3 Hours, in advance of need  - Initiate/Maintain 3 alarm  - Obtain necessary fall risk management equipment: 3  - Apply yellow socks and bracelet for high fall risk patients  - Consider moving patient to room near nurses station  Outcome: Progressing     Problem: MOBILITY - ADULT  Goal: Maintain or return to baseline ADL function  Description: INTERVENTIONS:  -  Assess patient's ability to carry out ADLs; assess patient's baseline for ADL function and identify physical deficits which impact ability to perform ADLs (bathing, care of mouth/teeth, toileting, grooming, dressing, etc )  - Assess/evaluate cause of self-care deficits   - Assess range of motion  - Assess patient's mobility; develop plan if impaired  - Assess patient's need for assistive devices and provide as appropriate  - Encourage maximum independence but intervene and supervise when necessary  - Involve family in performance of ADLs  - Assess for home care needs following discharge   - Consider OT consult to assist with ADL evaluation and planning for discharge  - Provide patient education as appropriate  Outcome: Progressing  Goal: Maintains/Returns to pre admission functional level  Description: INTERVENTIONS:  - Perform BMAT or MOVE assessment daily    - Set and communicate daily mobility goal to care team and patient/family/caregiver     - Collaborate with rehabilitation services on mobility goals if consulted  - Perform Range of Motion 3 times a day  - Reposition patient every 3 hours    - Dangle patient 3 times a day  - Stand patient 3 times a day  - Ambulate patient 3 times a day  - Out of bed to chair 3 times a day   - Out of bed for meals 3 times a day  - Out of bed for toileting  - Record patient progress and toleration of activity level   Outcome: Progressing     Problem: Prexisting or High Potential for Compromised Skin Integrity  Goal: Skin integrity is maintained or improved  Description: INTERVENTIONS:  - Identify patients at risk for skin breakdown  - Assess and monitor skin integrity  - Assess and monitor nutrition and hydration status  - Monitor labs   - Assess for incontinence   - Turn and reposition patient  - Assist with mobility/ambulation  - Relieve pressure over bony prominences  - Avoid friction and shearing  - Provide appropriate hygiene as needed including keeping skin clean and dry  - Evaluate need for skin moisturizer/barrier cream  - Collaborate with interdisciplinary team   - Patient/family teaching  - Consider wound care consult   Outcome: Progressing

## 2021-09-22 NOTE — PROGRESS NOTES
INTERNAL MEDICINE RESIDENCY PROGRESS NOTE     Name: Robin Sampson   Age & Sex: 60 y o  female   MRN: [id number]  Unit/Bed#: [facility] 908-01   Encounter: [id number]  Team: SOD Team A    PATIENT INFORMATION     Name: Robin Sampson   Age & Sex: 60 y o  female   MRN: [id number]  Hospital Stay Days: 35    ASSESSMENT/PLAN     Principal Problem:    Ambulatory dysfunction  Active Problems:    Chronic pain syndrome    Esophageal reflux    Generalized anxiety disorder    Tardive dyskinesia    History of CVA (cerebrovascular accident)    Mixed hyperlipidemia      Mixed hyperlipidemia  Assessment & Plan  -On Atorvastatin 40 mg PO qd    History of CVA (cerebrovascular accident)  Assessment & Plan  -On Aspirin 81 mg PO qd  -On Atorvastatin 40 mg PO qd    Tardive dyskinesia  Assessment & Plan  -Continue Valbenazine 80 mg daily  -appears to have slightly improved with addition of Lyrica 100 mg t i d     Generalized anxiety disorder  Assessment & Plan  Patient is on multiple psychiatric medications and was recently evaluated by inpatient psych at [facility] with adjustments to her medications including buspirone 50 mg t i d , paroxetine 60 mg daily, quetiapine 50 mg 4 times daily, mirtazapine 7 5 mg q h s , lithium 300 mg q h s  And hydroxyzine 50 mg t i d  P r n  [facility] [place] Patient continues to have increased anxiety with daily morning panic attacks  Patient also reports that her anxiety is worsened by her lack of pain control  Plan:  · Continue medical management  · Continue current psychotropic medication   · She is psychiatrically cleared to go to SNF  · Patient could benefit from outpatient psychiatric evaluation and follow-up  · Confirmed psych medication regimen following discharge from [address] [place] [address] as noted above  Esophageal reflux  Assessment & Plan  Continue Protonix 20 mg daily  Chronic pain syndrome  Assessment & Plan  Patient has been on chronic opioids for low back and leg pain    She was seen in the clinic and discussed opioid tapering  Most recent opioid prescription per PDMP was for morphine 15 mg for 10 days (20 tablets)  Per chart review, concern for polypharmacy and suspicion of drug-seeking  · On Tylenol 650 mg q8h  · Lidocaine patch/Voltaren gel  · On Lyrica 100 mg  T i d    · On Percocet 5-325 mg q6h prn  · Bengay topical ointment 4 times daily PRN  · 8/21 Added Aqua K    · 8/27 Added Robaxin  500 mg q 6h p r n     * Ambulatory dysfunction  Assessment & Plan  Patient with decreased physical mobility, at risk for falls  She uses walker and wheelchair at home  Presented to the ED requesting placement to SNF  · Encourage good body mechanics and assist with transfers  · Keep personal items and call bell close to the patient  · PT/OT recommendations- Home with home health rehabilitation  Cough-resolved as of 8/30/2021  Assessment & Plan  Patient complains of progressive worsening cough from 08/25 to 8/26  Cough is nonproductive  Patient also reports associated chills, nasal congestion, and nausea  -COVID PCR negative  -chest x-ray unremarkable  -Robitussin for cough  -continue to trend white count and fevers    -resolved as of 8/29        Disposition: Patient is medically stable  We are working with case management to locate a suitable rehab site for this patient  Possibility of acceptance at [facility], we will continue to work towards getting patient accepted at this facility  Ingrezza medication (valbenazine) was refilled and sent to patients preferred pharmacy yesterday  SUBJECTIVE     Patient seen and examined  No acute events overnight  She reports felling well and has no concerns at this time       OBJECTIVE     Vitals:    09/21/21 1608 09/21/21 2000 09/21/21 2212 09/22/21 0732   BP: 120/75  109/65 136/89   Pulse: 59  60 65   Resp: 18  20 16   Temp: 98 °F (36 7 °C)  98 5 °F (36 9 °C) 98 °F (36 7 °C)   TempSrc:       SpO2: 96% 96%  96%   Weight:       Height: Temperature:   Temp (24hrs), Av 2 °F (36 8 °C), Min:98 °F (36 7 °C), Max:98 5 °F (36 9 °C)    Temperature: 98 °F (36 7 °C)  Intake & Output:  I/O        07 -  0700  07 -  0700  07 -  0700    P  O  720 1320 240    Total Intake(mL/kg) 720 (7 9) 1320 (14 5) 240 (2 6)    Net +720 +1320 +240           Unmeasured Urine Occurrence  1 x 1 x        Weights:   IBW (Ideal Body Weight): 47 8 kg    Body mass index is 37 99 kg/m²  Weight (last 2 days)     None        Physical Exam  HENT:      Head: Normocephalic and atraumatic  Eyes:      Conjunctiva/sclera: Conjunctivae normal    Cardiovascular:      Rate and Rhythm: Normal rate and regular rhythm  Pulses: Normal pulses  Heart sounds: Normal heart sounds  Pulmonary:      Effort: Pulmonary effort is normal       Breath sounds: Normal breath sounds  Abdominal:      General: Bowel sounds are normal       Palpations: Abdomen is soft  Skin:     General: Skin is warm and dry  Capillary Refill: Capillary refill takes less than 2 seconds  Neurological:      Mental Status: She is oriented to person, place, and time  Mental status is at baseline  Psychiatric:         Mood and Affect: Mood normal        LABORATORY DATA     Labs: I have personally reviewed pertinent reports  Results from last 7 days   Lab Units 21  0542   WBC Thousand/uL 4 05*   HEMOGLOBIN g/dL 12 6   HEMATOCRIT % 39 3   PLATELETS Thousands/uL 245   NEUTROS PCT % 50   MONOS PCT % 9      Results from last 7 days   Lab Units 21  0542   POTASSIUM mmol/L 3 5   CHLORIDE mmol/L 110*   CO2 mmol/L 31   BUN mg/dL 10   CREATININE mg/dL 0 71   CALCIUM mg/dL 8 4                            IMAGING & DIAGNOSTIC TESTING     Radiology Results: I have personally reviewed pertinent reports  No results found  Other Diagnostic Testing: I have personally reviewed pertinent reports      ACTIVE MEDICATIONS     Current Facility-Administered Medications   Medication Dose Route Frequency    acetaminophen (TYLENOL) tablet 650 mg  650 mg Oral Q8H [address]    amLODIPine (NORVASC) tablet 5 mg  5 mg Oral Daily    ascorbic acid (VITAMIN C) tablet 500 mg  500 mg Oral BID    aspirin chewable tablet 81 mg  81 mg Oral Daily    atorvastatin (LIPITOR) tablet 40 mg  40 mg Oral Daily With Dinner    busPIRone (BUSPAR) tablet 15 mg  15 mg Oral TID    dextromethorphan-guaiFENesin (ROBITUSSIN DM) oral syrup 10 mL  10 mL Oral Q4H PRN    Diclofenac Sodium (VOLTAREN) 1 % topical gel 2 g  2 g Topical 4x Daily    enoxaparin (LOVENOX) subcutaneous injection 40 mg  40 mg Subcutaneous Daily    ferrous sulfate tablet 325 mg  325 mg Oral Daily With Breakfast    folic acid (FOLVITE) tablet 1,000 mcg  1,000 mcg Oral Daily    hydrOXYzine HCL (ATARAX) tablet 50 mg  50 mg Oral TID PRN    lidocaine (LIDODERM) 5 % patch 1 patch  1 patch Topical Daily    lidocaine (LIDODERM) 5 % patch 1 patch  1 patch Topical Daily    lithium carbonate (LITHOBID) CR tablet 300 mg  300 mg Oral HS    LORazepam (ATIVAN) tablet 0 5 mg  0 5 mg Oral Q8H PRN    menthol-methyl salicylate (BENGAY) 45-46 % cream   Apply externally 4x Daily PRN    methocarbamol (ROBAXIN) tablet 500 mg  500 mg Oral Q6H PRN    mirtazapine (REMERON) tablet 7 5 mg  7 5 mg Oral HS PRN    ondansetron (ZOFRAN-ODT) dispersible tablet 4 mg  4 mg Oral Q6H PRN    oxyCODONE-acetaminophen (PERCOCET) 5-325 mg per tablet 1 tablet  1 tablet Oral Q6H PRN    pantoprazole (PROTONIX) EC tablet 20 mg  20 mg Oral Early Morning    PARoxetine (PAXIL) tablet 60 mg  60 mg Oral Daily    polyethylene glycol (MIRALAX) packet 17 g  17 g Oral Daily    prazosin (MINIPRESS) capsule 2 mg  2 mg Oral Daily    pregabalin (LYRICA) capsule 100 mg  100 mg Oral TID    propranolol (INDERAL) tablet 20 mg  20 mg Oral BID before breakfast/lunch    QUEtiapine (SEROquel) tablet 50 mg  50 mg Oral 4x Daily (PC & HS)    senna-docusate sodium (SENOKOT S) 8 6-50 mg per tablet 1 tablet 1 tablet Oral Daily PRN    Valbenazine Tosylate CAPS 80 mg  80 mg Oral Daily       VTE Pharmacologic Prophylaxis: Enoxaparin (Lovenox)  VTE Mechanical Prophylaxis: sequential compression device    Portions of the record may have been created with voice recognition software  Occasional wrong word or "sound a like" substitutions may have occurred due to the inherent limitations of voice recognition software    Read the chart carefully and recognize, using context, where substitutions have occurred   ==  Evaristo Clark MD  [address]  Internal Medicine Residency PGY-1

## 2021-09-23 ENCOUNTER — TELEPHONE (OUTPATIENT)
Dept: CASE MANAGEMENT | Facility: HOSPITAL | Age: 56
End: 2021-09-23

## 2021-09-23 LAB — SARS-COV-2 RNA RESP QL NAA+PROBE: NEGATIVE

## 2021-09-23 PROCEDURE — 99239 HOSP IP/OBS DSCHRG MGMT >30: CPT | Performed by: INTERNAL MEDICINE

## 2021-09-23 PROCEDURE — U0003 INFECTIOUS AGENT DETECTION BY NUCLEIC ACID (DNA OR RNA); SEVERE ACUTE RESPIRATORY SYNDROME CORONAVIRUS 2 (SARS-COV-2) (CORONAVIRUS DISEASE [COVID-19]), AMPLIFIED PROBE TECHNIQUE, MAKING USE OF HIGH THROUGHPUT TECHNOLOGIES AS DESCRIBED BY CMS-2020-01-R: HCPCS | Performed by: STUDENT IN AN ORGANIZED HEALTH CARE EDUCATION/TRAINING PROGRAM

## 2021-09-23 PROCEDURE — U0005 INFEC AGEN DETEC AMPLI PROBE: HCPCS | Performed by: STUDENT IN AN ORGANIZED HEALTH CARE EDUCATION/TRAINING PROGRAM

## 2021-09-23 RX ORDER — OXYCODONE HYDROCHLORIDE AND ACETAMINOPHEN 5; 325 MG/1; MG/1
1 TABLET ORAL EVERY 8 HOURS PRN
Refills: 0
Start: 2021-09-23 | End: 2021-09-23

## 2021-09-23 RX ORDER — PREGABALIN 100 MG/1
100 CAPSULE ORAL 3 TIMES DAILY
Qty: 15 CAPSULE | Refills: 0 | Status: CANCELLED | OUTPATIENT
Start: 2021-09-23

## 2021-09-23 RX ORDER — METHOCARBAMOL 500 MG/1
500 TABLET, FILM COATED ORAL EVERY 6 HOURS PRN
Qty: 30 TABLET | Refills: 0
Start: 2021-09-23 | End: 2022-01-14 | Stop reason: SDUPTHER

## 2021-09-23 RX ORDER — OXYCODONE HYDROCHLORIDE AND ACETAMINOPHEN 5; 325 MG/1; MG/1
1 TABLET ORAL EVERY 8 HOURS PRN
Refills: 0
Start: 2021-09-23 | End: 2021-10-23

## 2021-09-23 RX ORDER — LORAZEPAM 0.5 MG/1
0.5 TABLET ORAL 2 TIMES DAILY PRN
Qty: 10 TABLET | Refills: 0 | Status: CANCELLED | OUTPATIENT
Start: 2021-09-23 | End: 2021-09-28

## 2021-09-23 RX ORDER — OXYCODONE HYDROCHLORIDE AND ACETAMINOPHEN 5; 325 MG/1; MG/1
1 TABLET ORAL EVERY 8 HOURS PRN
Qty: 15 TABLET | Refills: 0 | Status: CANCELLED | OUTPATIENT
Start: 2021-09-23 | End: 2021-09-28

## 2021-09-23 RX ORDER — LORAZEPAM 0.5 MG/1
0.5 TABLET ORAL 2 TIMES DAILY PRN
Refills: 0
Start: 2021-09-23 | End: 2021-09-23

## 2021-09-23 RX ORDER — LORAZEPAM 0.5 MG/1
0.5 TABLET ORAL 2 TIMES DAILY PRN
Refills: 0 | Status: ON HOLD
Start: 2021-09-23 | End: 2022-03-17

## 2021-09-23 RX ADMIN — ACETAMINOPHEN 650 MG: 325 TABLET, FILM COATED ORAL at 06:41

## 2021-09-23 RX ADMIN — PRAZOSIN HYDROCHLORIDE 2 MG: 2 CAPSULE ORAL at 08:49

## 2021-09-23 RX ADMIN — ASPIRIN 81 MG CHEWABLE TABLET 81 MG: 81 TABLET CHEWABLE at 08:47

## 2021-09-23 RX ADMIN — QUETIAPINE FUMARATE 50 MG: 25 TABLET ORAL at 11:43

## 2021-09-23 RX ADMIN — FERROUS SULFATE TAB 325 MG (65 MG ELEMENTAL FE) 325 MG: 325 (65 FE) TAB at 08:47

## 2021-09-23 RX ADMIN — BUSPIRONE HYDROCHLORIDE 15 MG: 10 TABLET ORAL at 08:47

## 2021-09-23 RX ADMIN — PREGABALIN 100 MG: 100 CAPSULE ORAL at 08:48

## 2021-09-23 RX ADMIN — AMLODIPINE BESYLATE 5 MG: 5 TABLET ORAL at 08:47

## 2021-09-23 RX ADMIN — OXYCODONE HYDROCHLORIDE AND ACETAMINOPHEN 1 TABLET: 5; 325 TABLET ORAL at 06:47

## 2021-09-23 RX ADMIN — ATORVASTATIN CALCIUM 40 MG: 40 TABLET, FILM COATED ORAL at 16:17

## 2021-09-23 RX ADMIN — QUETIAPINE FUMARATE 50 MG: 25 TABLET ORAL at 08:47

## 2021-09-23 RX ADMIN — OXYCODONE HYDROCHLORIDE AND ACETAMINOPHEN 500 MG: 500 TABLET ORAL at 16:18

## 2021-09-23 RX ADMIN — OXYCODONE HYDROCHLORIDE AND ACETAMINOPHEN 500 MG: 500 TABLET ORAL at 08:48

## 2021-09-23 RX ADMIN — PROPRANOLOL HYDROCHLORIDE 20 MG: 20 TABLET ORAL at 11:43

## 2021-09-23 RX ADMIN — QUETIAPINE FUMARATE 50 MG: 25 TABLET ORAL at 16:17

## 2021-09-23 RX ADMIN — PREGABALIN 100 MG: 100 CAPSULE ORAL at 16:23

## 2021-09-23 RX ADMIN — DICLOFENAC SODIUM 2 G: 10 GEL TOPICAL at 11:43

## 2021-09-23 RX ADMIN — OXYCODONE HYDROCHLORIDE AND ACETAMINOPHEN 1 TABLET: 5; 325 TABLET ORAL at 14:27

## 2021-09-23 RX ADMIN — HYDROXYZINE HYDROCHLORIDE 50 MG: 25 TABLET, FILM COATED ORAL at 14:27

## 2021-09-23 RX ADMIN — FOLIC ACID 1000 MCG: 1 TABLET ORAL at 08:48

## 2021-09-23 RX ADMIN — DICLOFENAC SODIUM 2 G: 10 GEL TOPICAL at 08:49

## 2021-09-23 RX ADMIN — PANTOPRAZOLE SODIUM 20 MG: 20 TABLET, DELAYED RELEASE ORAL at 06:41

## 2021-09-23 RX ADMIN — BUSPIRONE HYDROCHLORIDE 15 MG: 10 TABLET ORAL at 16:17

## 2021-09-23 RX ADMIN — LORAZEPAM 0.5 MG: 0.5 TABLET ORAL at 06:48

## 2021-09-23 RX ADMIN — LORAZEPAM 0.5 MG: 0.5 TABLET ORAL at 16:17

## 2021-09-23 RX ADMIN — ENOXAPARIN SODIUM 40 MG: 40 INJECTION SUBCUTANEOUS at 08:47

## 2021-09-23 RX ADMIN — PROPRANOLOL HYDROCHLORIDE 20 MG: 20 TABLET ORAL at 06:41

## 2021-09-23 RX ADMIN — ACETAMINOPHEN 650 MG: 325 TABLET, FILM COATED ORAL at 16:17

## 2021-09-23 NOTE — CASE MANAGEMENT
[facility] in [place] asking if pt can admit tonight & have auth start date started today  TC from [place] [facility]  [place] [facility] [place] [address] with same auth number  NRD 9/27 to her  Ecin message sent to [facility]  Covid negative  Updated facility  Time changed to [facility] wcv for 7pm  Pt aware & agreeable  She will update her family  TC to son Azucena Lewis [phone] & left VM with dc details       Updated SOD A, pt & RN

## 2021-09-23 NOTE — DISCHARGE SUMMARY
INTERNAL MEDICINE RESIDENCY DISCHARGE SUMMARY     Samantha Pearson   60 y o  female  MRN: [id number]  Room/Bed: [facility] 908/[facility] 908-01     [address] [place] [address]   Encounter: [id number]    Principal Problem:    Ambulatory dysfunction  Active Problems:    Chronic pain syndrome    Esophageal reflux    Generalized anxiety disorder    Tardive dyskinesia    History of CVA (cerebrovascular accident)    Mixed hyperlipidemia      Mixed hyperlipidemia  Assessment & Plan  -On Atorvastatin 40 mg PO qd    History of CVA (cerebrovascular accident)  Assessment & Plan  -On Aspirin 81 mg PO qd  -On Atorvastatin 40 mg PO qd    Tardive dyskinesia  Assessment & Plan  -Continue Valbenazine 80 mg daily  -appears to have slightly improved with addition of Lyrica 100 mg t i d     Generalized anxiety disorder  Assessment & Plan  Patient is on multiple psychiatric medications and was recently evaluated by inpatient psych at [facility] with adjustments to her medications including buspirone 50 mg t i d , paroxetine 60 mg daily, quetiapine 50 mg 4 times daily, mirtazapine 7 5 mg q h s , lithium 300 mg q h s  And hydroxyzine 50 mg t i d  P r n  Fabiana Beyer Patient continues to have increased anxiety with daily morning panic attacks  Patient also reports that her anxiety is worsened by her lack of pain control  Plan:  · Continue medical management  · Continue current psychotropic medication   · She is psychiatrically cleared to go to SNF  · Patient could benefit from outpatient psychiatric evaluation and follow-up  · Confirmed psych medication regimen following discharge from [address] [place] [address] as noted above  Esophageal reflux  Assessment & Plan  Continue Protonix 20 mg daily  Chronic pain syndrome  Assessment & Plan  Patient has been on chronic opioids for low back and leg pain  She was seen in the clinic and discussed opioid tapering    Most recent opioid prescription per PDMP was for morphine 15 mg for 10 days (20 tablets)  Per chart review, concern for polypharmacy and suspicion of drug-seeking  · On Tylenol 650 mg q8h  · Lidocaine patch/Voltaren gel  · On Lyrica 100 mg  T i d    · On Percocet 5-325 mg q6h prn  · Bengay topical ointment 4 times daily PRN  · 8/21 Added Aqua K    · 8/27 Added Robaxin  500 mg q 6h p r n     * Ambulatory dysfunction  Assessment & Plan  Patient with decreased physical mobility, at risk for falls  She uses walker and wheelchair at home  Presented to the ED requesting placement to SNF  · Encourage good body mechanics and assist with transfers  · Keep personal items and call bell close to the patient  · PT/OT recommendations- Home with home health rehabilitation  Cough-resolved as of 8/30/2021  Assessment & Plan  Patient complains of progressive worsening cough from 08/25 to 8/26  Cough is nonproductive  Patient also reports associated chills, nasal congestion, and nausea  -COVID PCR negative  -chest x-ray unremarkable  -Robitussin for cough  -continue to trend white count and fevers    -resolved as of 8/29        [address] COURSE     Patient is a 60years old female with extensive psychiatric history on several psychotropic medications, chronic pain syndrome, opioid dependence, hypertension, hyperlipidemia, prior CVA presented to the ED requesting placement to skilled nursing facility  Patient was seen earlier today in the clinic with her son Azucena Lewis   One day prior to that she was seen in the ED due to panic attack when her son wanted to leave the house without her  Son is returning to work today on 08/18 after taking Aleve to help take care of his mother  Patient stated that she will not be able to manage by herself when her son is at work  She is worried that she would fall  She uses a walker and has a wheelchair but no enough room to use wheelchair and she is unable to self propel wheelchair due to decreased strength   Due to concern for patient's safety the presented to the ED to request rehab placement  During the course of her stay, patient has been medically optimized and been stable from a medical standpoint  Patient is strongly urged to follow up with her PCP following discharge  Subjective: Patient was seen and examined  No acute events overnight  Patient reports to feeling well  Physical Exam  VS: 141/100 BP , HR 80 , 98 % O2 saturation on room air, Temp 98 2F  General: Patient seen by bedside  Not in acute distress  HEENT: No scleral icterus  Neck: No JVD noted  CV: Regular rate and rhythm, S1/S2 normal  Lungs: Lung sounds auscultated bilaterally, non-labored breathing  GI: Soft, non-distended, + BS, No CVAT  Extremities: No pedal edema noted  Neurologic: AAOx3  Skin: Warm, dry       DISCHARGE INFORMATION     Admitting Provider: Katina Bernal DO  Admission Date: 8/17/2021    Discharge Provider: Katina Bernal DO  Discharge Date: 09/23/2021    Discharge Disposition: Home/Self Care  Discharge Condition: stable  Discharge with Lines: no    Discharge Diet: regular diet  Activity Restrictions: none    Discharge Diagnoses:  Principal Problem:    Ambulatory dysfunction  Active Problems:    Chronic pain syndrome    Esophageal reflux    Generalized anxiety disorder    Tardive dyskinesia    History of CVA (cerebrovascular accident)    Mixed hyperlipidemia  Resolved Problems:    Chronic bilateral low back pain with bilateral sciatica    Cough      Consulting Providers:      Diagnostic & Therapeutic Procedures Performed:  No results found      Code Status: Level 1 - Full Code  Advance Directive & Living Will: <no information>  Power of :    POLST:      Medications:  Current Discharge Medication List        Current Discharge Medication List        Current Discharge Medication List      CONTINUE these medications which have NOT CHANGED    Details   amLODIPine (NORVASC) 5 mg tablet Take 1 tablet (5 mg total) by mouth daily  Qty: 90 tablet, Refills: 3    Associated Diagnoses: Hypertension, unspecified type      atorvastatin (LIPITOR) 40 mg tablet TAKE 1 TABLET BY MOUTH DAILY WITH DINNER  Qty: 30 tablet, Refills: 0    Associated Diagnoses: Altered mental status      !! busPIRone (BUSPAR) 10 mg tablet TAKE 2 TABLETS (20 MG TOTAL) BY MOUTH 3 (THREE) TIMES A DAY  [facility] Aspirin Adult Low Dose 81 MG chewable tablet CHEW 1 TABLET BY MOUTH DAILY  Qty: 30 tablet, Refills: 0    Associated Diagnoses:  Altered mental status      [facility] Vitamin C 500 MG tablet TAKE 1 TABLET BY MOUTH TWICE A DAY  Qty: 60 tablet, Refills: 0    Associated Diagnoses: Primary osteoarthritis of right knee; Preop testing      Diclofenac Sodium (VOLTAREN) 1 % Apply 2 g topically 4 (four) times a day  Qty: 150 g, Refills: 0    Associated Diagnoses: Bilateral chronic knee pain      ferrous sulfate 324 (65 Fe) mg TAKE 1 TABLET (324 MG TOTAL) BY MOUTH 2 (TWO) TIMES A DAY BEFORE MEALS  Qty: 60 tablet, Refills: 1    Associated Diagnoses: Primary osteoarthritis of right knee; Preop testing      folic acid (FOLVITE) 1 mg tablet TAKE 1 TABLET BY MOUTH EVERY DAY  Qty: 30 tablet, Refills: 1    Associated Diagnoses: Primary osteoarthritis of right knee; Preop testing      hydrOXYzine HCL (ATARAX) 50 mg tablet Take 50 mg by mouth 3 (three) times a day as needed for itching      lithium carbonate (LITHOBID) 300 mg CR tablet Take 1 tablet (300 mg total) by mouth daily at bedtime  Qty: 30 tablet, Refills: 0    Associated Diagnoses: Medical clearance for psychiatric admission      mirtazapine (REMERON) 7 5 MG tablet Take 1 tablet (7 5 mg total) by mouth daily at bedtime as needed (insomnia)  Qty: 30 tablet, Refills: 0    Associated Diagnoses: Medical clearance for psychiatric admission      pantoprazole (PROTONIX) 20 mg tablet TAKE 1 TABLET (20 MG TOTAL) BY MOUTH DAILY IN THE EARLY MORNING  Qty: 30 tablet, Refills: 0    Associated Diagnoses: Nausea      PARoxetine (PAXIL) 30 mg tablet Take 2 tablets (60 mg total) by mouth daily  Qty: 60 tablet, Refills: 0    Associated Diagnoses: Medical clearance for psychiatric admission      potassium chloride (K-DUR,KLOR-CON) 20 mEq tablet Take 1 tablet (20 mEq total) by mouth daily  Qty: 30 tablet, Refills: 0    Associated Diagnoses: Medical clearance for psychiatric admission      prazosin (MINIPRESS) 2 mg capsule TAKE 1 CAPSULE BY MOUTH EVERY DAY  Qty: 30 capsule, Refills: 1    Associated Diagnoses: Hypertension, unspecified type      propranolol (INDERAL) 20 mg tablet Take 1 tablet (20 mg total) by mouth 2 (two) times a day before breakfast and lunch  Qty: 90 tablet, Refills: 0    Associated Diagnoses: Medical clearance for psychiatric admission      QUEtiapine (SEROquel) 25 mg tablet Take 2 tablets (50 mg total) by mouth 4 (four) times daily (after meals and at bedtime)  Qty: 120 tablet, Refills: 0    Associated Diagnoses: Medical clearance for psychiatric admission      senna-docusate sodium (SENOKOT S) 8 6-50 mg per tablet Take 1 tablet by mouth daily as needed for constipation  Qty: 30 tablet, Refills: 0    Associated Diagnoses: Medical clearance for psychiatric admission      !! busPIRone (BUSPAR) 15 mg tablet Take 1 tablet (15 mg total) by mouth 3 (three) times a day  Qty: 90 tablet, Refills: 0    Associated Diagnoses: Bipolar II disorder (HCC)      Diapers & Supplies (Huggies Pull-Ups) MISC Use 3 (three) times a day  Qty: 100 each, Refills: 3    Comments: Please dispense size XXL  Associated Diagnoses: Urge incontinence of urine      naloxone (NARCAN) 4 mg/0 1 mL nasal spray Administer 1 spray into a nostril  If no response after 2-3 minutes, give another dose in the other nostril using a new spray    Qty: 1 each, Refills: 1    Associated Diagnoses: Lumbar radiculopathy; Chronic pain disorder      pregabalin (LYRICA) 100 mg capsule Take 1 capsule (100 mg total) by mouth 2 (two) times a day  Qty: 60 capsule, Refills: 1    Associated Diagnoses: Lumbar radiculopathy; Chronic pain disorder; Chronic pain syndrome; Fibromyalgia; Chronic bilateral low back pain with bilateral sciatica      Respiratory Therapy Supplies (Nebulizer) YUDY Use as needed (Shortness of breath)  Qty: 1 each, Refills: 0    Associated Diagnoses: Dyspnea       !! - Potential duplicate medications found  Please discuss with provider  Allergies:  No Known Allergies    FOLLOW-UP     PCP Outpatient Follow-up:  TCM visit in 1 week,  clinic    Discharge Statement:   I spent 45 minutes minutes discharging the patient  This time was spent on the day of discharge  I had direct contact with the patient on the day of discharge  Additional documentation is required if more than 30 minutes were spent on discharge  Portions of the record may have been created with voice recognition software  Occasional wrong word or "sound a like" substitutions may have occurred due to the inherent limitations of voice recognition software    Read the chart carefully and recognize, using context, where substitutions have occurred     ==  Carolyn Quezada MD  [address]  Internal Medicine Resident PGY-1

## 2021-09-23 NOTE — CASE MANAGEMENT
Transportation arranged with [facility] via wheelchair to [facility] for 9/24/21 @ 1pm  Confirmed with Michel Haleied Montse Morillo - Case Management via e-mail

## 2021-09-23 NOTE — CASE MANAGEMENT
Transportation via [place] to [place] on 9/24/21 - cancelled  Patient to be transported to [facility] 9/23/21 @ 6pm via [facility] - confirmed with Blu Dey  Notified Jewell Santoyo - Case Management via e-mail        Update: Patient to be transported by [facility] today at 830pm instead of 6pm

## 2021-09-24 ENCOUNTER — TRANSITIONAL CARE MANAGEMENT (OUTPATIENT)
Dept: INTERNAL MEDICINE CLINIC | Facility: CLINIC | Age: 56
End: 2021-09-24

## 2021-09-24 VITALS
RESPIRATION RATE: 16 BRPM | SYSTOLIC BLOOD PRESSURE: 129 MMHG | BODY MASS INDEX: 37.96 KG/M2 | HEART RATE: 61 BPM | WEIGHT: 201.06 LBS | HEIGHT: 61 IN | OXYGEN SATURATION: 99 % | TEMPERATURE: 98.5 F | DIASTOLIC BLOOD PRESSURE: 72 MMHG

## 2021-09-24 NOTE — UTILIZATION REVIEW
Notification of Discharge   This is a Notification of Discharge from our facility [address]  Please be advised that this patient has been discharge from our facility  Below you will find the admission and discharge date and time including the patients disposition  UTILIZATION REVIEW CONTACT:  Nakia Pineda  Utilization   Network Utilization Review Department  Phone: [phone] x carefully listen to the prompts  All voicemails are confidential   Email: [email or web address]     PHYSICIAN ADVISORY SERVICES:  FOR BTKN-RN-DPFG REVIEW - MEDICAL NECESSITY DENIAL  Phone: [phone]  Fax: [phone]  Email: [email or web address]     PRESENTATION DATE: 2021 10:51 PM  OBERVATION ADMISSION DATE:   INPATIENT ADMISSION DATE: 21  5:29 PM   DISCHARGE DATE: 2021  5:24 PM  DISPOSITION: Non [facility] SNF/TCU/SNU Non [facility] SNF/TCU/SNU      IMPORTANT INFORMATION:  Send all requests for admission clinical reviews, approved or denied determinations and any other requests to dedicated fax number below belonging to the campus where the patient is receiving treatment   List of dedicated fax numbers:  [address] [place] [address] DENIALS (Administrative/Medical Necessity) [phone]   [address]  (Maternity/NICU/Pediatrics) [phone]   [facility]  [phone]   Marquita La [phone]   Jennifer Shipman [phone]   [address] [phone]   [facility]  [phone]   [address] [place] [address], [address]  [address] [facility] [address] [facility] [phone]

## 2021-09-26 DIAGNOSIS — R41.82 ALTERED MENTAL STATUS: ICD-10-CM

## 2021-09-27 ENCOUNTER — PATIENT OUTREACH (OUTPATIENT)
Dept: INTERNAL MEDICINE CLINIC | Facility: CLINIC | Age: 56
End: 2021-09-27

## 2021-09-27 NOTE — PROGRESS NOTES
Outpatient Care Management Note:    Patient was inpatient at [facility] from 8/17/21 - 9/23/21 for ambulatory dysfunction  Patient and family requested placement to a skilled nursing facility  Patient discharged to [facility] in [place] on 9/23/21  Will close from outpatient nurse care management at this time  Please re-consult if needed

## 2021-09-28 RX ORDER — ATORVASTATIN CALCIUM 40 MG/1
TABLET, FILM COATED ORAL
Qty: 30 TABLET | Refills: 0 | Status: SHIPPED | OUTPATIENT
Start: 2021-09-28 | End: 2021-10-13

## 2021-09-30 ENCOUNTER — PATIENT OUTREACH (OUTPATIENT)
Dept: INTERNAL MEDICINE CLINIC | Facility: CLINIC | Age: 56
End: 2021-09-30

## 2021-09-30 NOTE — PROGRESS NOTES
SW has received update the pt has been discharged and transfered to [facility] in [place] on 9/23/21  [phone] and left a message with there HIRAM office Agnes Body) to inquire if this placement  is for rehab or LTC  HIRAM later received a return call from [employer]  of HIRAM at [address]  She relates that it is not clear yet if she will be along term placement  HIRAM did update her the pt was [place] Waiver approved but services had never startred  Also that she would have required 24 /7 care and that pt's family was unable to do same and the their current living situation was not handicapped accessible  She shared pt does have [address] services seeing her at their facility  Miles Lora has our contact info IF pt should she return to this area  HIRAM will close case at this time  Please re-consult HIRAM if needed

## 2021-10-05 ENCOUNTER — TELEPHONE (OUTPATIENT)
Dept: INTERNAL MEDICINE CLINIC | Facility: CLINIC | Age: 56
End: 2021-10-05

## 2021-11-01 ENCOUNTER — PATIENT OUTREACH (OUTPATIENT)
Dept: INTERNAL MEDICINE CLINIC | Facility: CLINIC | Age: 56
End: 2021-11-01

## 2021-11-10 ENCOUNTER — PATIENT OUTREACH (OUTPATIENT)
Dept: INTERNAL MEDICINE CLINIC | Facility: CLINIC | Age: 56
End: 2021-11-10

## 2021-11-10 ENCOUNTER — OFFICE VISIT (OUTPATIENT)
Dept: INTERNAL MEDICINE CLINIC | Facility: CLINIC | Age: 56
End: 2021-11-10

## 2021-11-10 VITALS
HEART RATE: 58 BPM | HEIGHT: 61 IN | SYSTOLIC BLOOD PRESSURE: 107 MMHG | BODY MASS INDEX: 38.89 KG/M2 | TEMPERATURE: 98 F | DIASTOLIC BLOOD PRESSURE: 81 MMHG | WEIGHT: 206 LBS

## 2021-11-10 DIAGNOSIS — Z76.89 ENCOUNTER FOR SUPPORT AND COORDINATION OF TRANSITION OF CARE: Primary | ICD-10-CM

## 2021-11-10 DIAGNOSIS — Z71.89 COMPLEX CARE COORDINATION: Primary | ICD-10-CM

## 2021-11-10 DIAGNOSIS — Z12.11 ENCOUNTER FOR SCREENING COLONOSCOPY: ICD-10-CM

## 2021-11-10 DIAGNOSIS — M54.16 LUMBAR RADICULOPATHY: ICD-10-CM

## 2021-11-10 DIAGNOSIS — M17.0 PRIMARY OSTEOARTHRITIS OF BOTH KNEES: ICD-10-CM

## 2021-11-10 DIAGNOSIS — E66.01 MORBID OBESITY WITH BMI OF 40.0-44.9, ADULT (HCC): ICD-10-CM

## 2021-11-10 DIAGNOSIS — G89.4 CHRONIC PAIN DISORDER: Primary | ICD-10-CM

## 2021-11-10 DIAGNOSIS — M51.16 LUMBAR DISC DISEASE WITH RADICULOPATHY: Primary | ICD-10-CM

## 2021-11-10 PROCEDURE — 1036F TOBACCO NON-USER: CPT | Performed by: INTERNAL MEDICINE

## 2021-11-10 PROCEDURE — 99214 OFFICE O/P EST MOD 30 MIN: CPT | Performed by: INTERNAL MEDICINE

## 2021-11-10 PROCEDURE — 3079F DIAST BP 80-89 MM HG: CPT | Performed by: INTERNAL MEDICINE

## 2021-11-10 PROCEDURE — 3074F SYST BP LT 130 MM HG: CPT | Performed by: INTERNAL MEDICINE

## 2021-11-10 RX ORDER — PREGABALIN 150 MG/1
150 CAPSULE ORAL 3 TIMES DAILY
Qty: 120 CAPSULE | Refills: 1
Start: 2021-11-10 | End: 2022-02-28 | Stop reason: SDUPTHER

## 2021-11-10 RX ORDER — OXYCODONE HYDROCHLORIDE AND ACETAMINOPHEN 5; 325 MG/1; MG/1
1 TABLET ORAL EVERY 8 HOURS PRN
Qty: 45 TABLET | Refills: 0 | Status: SHIPPED | OUTPATIENT
Start: 2021-11-10 | End: 2021-12-06 | Stop reason: SDUPTHER

## 2021-11-10 RX ORDER — LIDOCAINE HYDROCHLORIDE 20 MG/ML
JELLY TOPICAL DAILY
Qty: 30 ML | Refills: 1 | Status: SHIPPED | OUTPATIENT
Start: 2021-11-10 | End: 2021-12-24

## 2021-11-11 ENCOUNTER — PATIENT OUTREACH (OUTPATIENT)
Dept: INTERNAL MEDICINE CLINIC | Facility: CLINIC | Age: 56
End: 2021-11-11

## 2021-11-11 DIAGNOSIS — M54.16 LUMBAR RADICULOPATHY: ICD-10-CM

## 2021-11-11 DIAGNOSIS — G89.4 CHRONIC PAIN DISORDER: ICD-10-CM

## 2021-11-12 ENCOUNTER — PATIENT OUTREACH (OUTPATIENT)
Dept: INTERNAL MEDICINE CLINIC | Facility: CLINIC | Age: 56
End: 2021-11-12

## 2021-11-16 DIAGNOSIS — M25.562 BILATERAL CHRONIC KNEE PAIN: ICD-10-CM

## 2021-11-16 DIAGNOSIS — G89.29 BILATERAL CHRONIC KNEE PAIN: ICD-10-CM

## 2021-11-16 DIAGNOSIS — M25.561 BILATERAL CHRONIC KNEE PAIN: ICD-10-CM

## 2021-11-19 ENCOUNTER — PATIENT OUTREACH (OUTPATIENT)
Dept: INTERNAL MEDICINE CLINIC | Facility: CLINIC | Age: 56
End: 2021-11-19

## 2021-11-23 RX ORDER — BUSPIRONE HYDROCHLORIDE 10 MG/1
20 TABLET ORAL 3 TIMES DAILY
COMMUNITY
Start: 2021-11-08

## 2021-11-23 RX ORDER — MIRTAZAPINE 15 MG/1
TABLET, FILM COATED ORAL
COMMUNITY
Start: 2021-11-02 | End: 2022-02-28 | Stop reason: CLARIF

## 2021-11-23 RX ORDER — OMEPRAZOLE 20 MG/1
CAPSULE, DELAYED RELEASE ORAL
COMMUNITY
Start: 2021-10-29 | End: 2022-02-17 | Stop reason: SDDI

## 2021-11-26 ENCOUNTER — OFFICE VISIT (OUTPATIENT)
Dept: OBGYN CLINIC | Facility: CLINIC | Age: 56
End: 2021-11-26
Payer: COMMERCIAL

## 2021-11-26 ENCOUNTER — APPOINTMENT (OUTPATIENT)
Dept: RADIOLOGY | Facility: AMBULARY SURGERY CENTER | Age: 56
End: 2021-11-26
Attending: ORTHOPAEDIC SURGERY
Payer: COMMERCIAL

## 2021-11-26 VITALS
HEIGHT: 61 IN | HEART RATE: 56 BPM | BODY MASS INDEX: 38.89 KG/M2 | WEIGHT: 206 LBS | SYSTOLIC BLOOD PRESSURE: 129 MMHG | DIASTOLIC BLOOD PRESSURE: 78 MMHG

## 2021-11-26 DIAGNOSIS — M17.0 PRIMARY OSTEOARTHRITIS OF BOTH KNEES: ICD-10-CM

## 2021-11-26 DIAGNOSIS — M17.0 PRIMARY OSTEOARTHRITIS OF BOTH KNEES: Primary | ICD-10-CM

## 2021-11-26 PROCEDURE — 20610 DRAIN/INJ JOINT/BURSA W/O US: CPT | Performed by: ORTHOPAEDIC SURGERY

## 2021-11-26 PROCEDURE — 3008F BODY MASS INDEX DOCD: CPT | Performed by: INTERNAL MEDICINE

## 2021-11-26 PROCEDURE — 99212 OFFICE O/P EST SF 10 MIN: CPT | Performed by: ORTHOPAEDIC SURGERY

## 2021-11-26 PROCEDURE — 73562 X-RAY EXAM OF KNEE 3: CPT

## 2021-11-26 PROCEDURE — 3008F BODY MASS INDEX DOCD: CPT | Performed by: ORTHOPAEDIC SURGERY

## 2021-11-26 PROCEDURE — 1036F TOBACCO NON-USER: CPT | Performed by: ORTHOPAEDIC SURGERY

## 2021-11-26 RX ORDER — BUPIVACAINE HYDROCHLORIDE 2.5 MG/ML
1 INJECTION, SOLUTION INFILTRATION; PERINEURAL
Status: COMPLETED | OUTPATIENT
Start: 2021-11-26 | End: 2021-11-26

## 2021-11-26 RX ORDER — BETAMETHASONE SODIUM PHOSPHATE AND BETAMETHASONE ACETATE 3; 3 MG/ML; MG/ML
12 INJECTION, SUSPENSION INTRA-ARTICULAR; INTRALESIONAL; INTRAMUSCULAR; SOFT TISSUE
Status: COMPLETED | OUTPATIENT
Start: 2021-11-26 | End: 2021-11-26

## 2021-11-26 RX ORDER — LIDOCAINE HYDROCHLORIDE 10 MG/ML
1 INJECTION, SOLUTION INFILTRATION; PERINEURAL
Status: COMPLETED | OUTPATIENT
Start: 2021-11-26 | End: 2021-11-26

## 2021-11-26 RX ADMIN — LIDOCAINE HYDROCHLORIDE 1 ML: 10 INJECTION, SOLUTION INFILTRATION; PERINEURAL at 11:47

## 2021-11-26 RX ADMIN — BUPIVACAINE HYDROCHLORIDE 1 ML: 2.5 INJECTION, SOLUTION INFILTRATION; PERINEURAL at 11:47

## 2021-11-26 RX ADMIN — BETAMETHASONE SODIUM PHOSPHATE AND BETAMETHASONE ACETATE 12 MG: 3; 3 INJECTION, SUSPENSION INTRA-ARTICULAR; INTRALESIONAL; INTRAMUSCULAR; SOFT TISSUE at 11:47

## 2021-11-29 ENCOUNTER — HOSPITAL ENCOUNTER (EMERGENCY)
Facility: HOSPITAL | Age: 56
Discharge: HOME | End: 2021-11-29
Attending: EMERGENCY MEDICINE | Admitting: EMERGENCY MEDICINE
Payer: COMMERCIAL

## 2021-11-29 ENCOUNTER — APPOINTMENT (EMERGENCY)
Dept: RADIOLOGY | Facility: HOSPITAL | Age: 56
End: 2021-11-29
Payer: COMMERCIAL

## 2021-11-29 ENCOUNTER — TELEPHONE (OUTPATIENT)
Dept: INTERNAL MEDICINE CLINIC | Facility: CLINIC | Age: 56
End: 2021-11-29

## 2021-11-29 VITALS
RESPIRATION RATE: 20 BRPM | WEIGHT: 208 LBS | HEART RATE: 67 BPM | OXYGEN SATURATION: 100 % | SYSTOLIC BLOOD PRESSURE: 147 MMHG | DIASTOLIC BLOOD PRESSURE: 101 MMHG | TEMPERATURE: 95.8 F | BODY MASS INDEX: 39.3 KG/M2

## 2021-11-29 DIAGNOSIS — F41.9 ANXIETY: ICD-10-CM

## 2021-11-29 DIAGNOSIS — R07.9 CHEST PAIN: Primary | ICD-10-CM

## 2021-11-29 LAB
ANION GAP SERPL CALCULATED.3IONS-SCNC: 9 MMOL/L (ref 5–14)
ATRIAL RATE: 70 BPM
ATRIAL RATE: 72 BPM
BASOPHILS # BLD AUTO: 0.1 THOUSANDS/ΜL (ref 0–0.1)
BASOPHILS NFR BLD AUTO: 1 % (ref 0–1)
BUN SERPL-MCNC: 12 MG/DL (ref 5–25)
CALCIUM SERPL-MCNC: 9.2 MG/DL (ref 8.4–10.2)
CARDIAC TROPONIN I PNL SERPL HS: 5 NG/L (ref 8–18)
CHLORIDE SERPL-SCNC: 108 MMOL/L (ref 97–108)
CO2 SERPL-SCNC: 22 MMOL/L (ref 22–30)
CREAT SERPL-MCNC: 0.71 MG/DL (ref 0.6–1.2)
EOSINOPHIL # BLD AUTO: 0 THOUSAND/ΜL (ref 0–0.4)
EOSINOPHIL NFR BLD AUTO: 0 % (ref 0–6)
ERYTHROCYTE [DISTWIDTH] IN BLOOD BY AUTOMATED COUNT: 14 %
GFR SERPL CREATININE-BSD FRML MDRD: 109 ML/MIN/1.73SQ M
GLUCOSE SERPL-MCNC: 112 MG/DL (ref 70–99)
HCT VFR BLD AUTO: 44.6 % (ref 36–46)
HGB BLD-MCNC: 14.9 G/DL (ref 12–16)
LITHIUM SERPL-SCNC: 0.4 MMOL/L (ref 0.6–1.2)
LYMPHOCYTES # BLD AUTO: 1.8 THOUSANDS/ΜL (ref 0.5–4)
LYMPHOCYTES NFR BLD AUTO: 19 % (ref 25–45)
MCH RBC QN AUTO: 29.7 PG (ref 26–34)
MCHC RBC AUTO-ENTMCNC: 33.3 G/DL (ref 31–36)
MCV RBC AUTO: 89 FL (ref 80–100)
MONOCYTES # BLD AUTO: 0.7 THOUSAND/ΜL (ref 0.2–0.9)
MONOCYTES NFR BLD AUTO: 8 % (ref 1–10)
NEUTROPHILS # BLD AUTO: 6.8 THOUSANDS/ΜL (ref 1.8–7.8)
NEUTS SEG NFR BLD AUTO: 72 % (ref 45–65)
P AXIS: 45 DEGREES
P AXIS: 59 DEGREES
PLATELET # BLD AUTO: 357 THOUSANDS/UL (ref 150–450)
PMV BLD AUTO: 8 FL (ref 8.9–12.7)
POTASSIUM SERPL-SCNC: 3.2 MMOL/L (ref 3.6–5)
PR INTERVAL: 122 MS
PR INTERVAL: 126 MS
QRS AXIS: 72 DEGREES
QRS AXIS: 81 DEGREES
QRSD INTERVAL: 96 MS
QRSD INTERVAL: 98 MS
QT INTERVAL: 418 MS
QT INTERVAL: 488 MS
QTC INTERVAL: 457 MS
QTC INTERVAL: 527 MS
RBC # BLD AUTO: 5.01 MILLION/UL (ref 4–5.2)
SODIUM SERPL-SCNC: 139 MMOL/L (ref 137–147)
T WAVE AXIS: 32 DEGREES
T WAVE AXIS: 60 DEGREES
VENTRICULAR RATE: 70 BPM
VENTRICULAR RATE: 72 BPM
WBC # BLD AUTO: 9.5 THOUSAND/UL (ref 4.5–11)

## 2021-11-29 PROCEDURE — 99284 EMERGENCY DEPT VISIT MOD MDM: CPT

## 2021-11-29 PROCEDURE — 99285 EMERGENCY DEPT VISIT HI MDM: CPT | Performed by: EMERGENCY MEDICINE

## 2021-11-29 PROCEDURE — 71045 X-RAY EXAM CHEST 1 VIEW: CPT

## 2021-11-29 PROCEDURE — 80178 ASSAY OF LITHIUM: CPT | Performed by: EMERGENCY MEDICINE

## 2021-11-29 PROCEDURE — 93005 ELECTROCARDIOGRAM TRACING: CPT

## 2021-11-29 PROCEDURE — 36415 COLL VENOUS BLD VENIPUNCTURE: CPT | Performed by: EMERGENCY MEDICINE

## 2021-11-29 PROCEDURE — 93010 ELECTROCARDIOGRAM REPORT: CPT | Performed by: INTERNAL MEDICINE

## 2021-11-29 PROCEDURE — 80048 BASIC METABOLIC PNL TOTAL CA: CPT | Performed by: EMERGENCY MEDICINE

## 2021-11-29 PROCEDURE — 85025 COMPLETE CBC W/AUTO DIFF WBC: CPT | Performed by: EMERGENCY MEDICINE

## 2021-11-29 PROCEDURE — 84484 ASSAY OF TROPONIN QUANT: CPT | Performed by: EMERGENCY MEDICINE

## 2021-11-29 RX ORDER — ACETAMINOPHEN 325 MG/1
975 TABLET ORAL ONCE
Status: COMPLETED | OUTPATIENT
Start: 2021-11-29 | End: 2021-11-29

## 2021-11-29 RX ORDER — LORAZEPAM 1 MG/1
1 TABLET ORAL ONCE
Status: COMPLETED | OUTPATIENT
Start: 2021-11-29 | End: 2021-11-29

## 2021-11-29 RX ORDER — ASPIRIN 81 MG/1
324 TABLET, CHEWABLE ORAL ONCE
Status: COMPLETED | OUTPATIENT
Start: 2021-11-29 | End: 2021-11-29

## 2021-11-29 RX ORDER — HYDROXYZINE 50 MG/1
50 TABLET, FILM COATED ORAL ONCE
Status: COMPLETED | OUTPATIENT
Start: 2021-11-29 | End: 2021-11-29

## 2021-11-29 RX ADMIN — ACETAMINOPHEN 975 MG: 325 TABLET ORAL at 03:08

## 2021-11-29 RX ADMIN — HYDROXYZINE HYDROCHLORIDE 50 MG: 50 TABLET, FILM COATED ORAL at 02:38

## 2021-11-29 RX ADMIN — ASPIRIN 81 MG CHEWABLE TABLET 324 MG: 81 TABLET CHEWABLE at 02:37

## 2021-11-29 RX ADMIN — LORAZEPAM 1 MG: 1 TABLET ORAL at 02:38

## 2021-12-01 DIAGNOSIS — G89.29 BILATERAL CHRONIC KNEE PAIN: ICD-10-CM

## 2021-12-01 DIAGNOSIS — M25.562 BILATERAL CHRONIC KNEE PAIN: ICD-10-CM

## 2021-12-01 DIAGNOSIS — M25.561 BILATERAL CHRONIC KNEE PAIN: ICD-10-CM

## 2021-12-03 ENCOUNTER — PATIENT OUTREACH (OUTPATIENT)
Dept: INTERNAL MEDICINE CLINIC | Facility: CLINIC | Age: 56
End: 2021-12-03

## 2021-12-06 ENCOUNTER — HOSPITAL ENCOUNTER (EMERGENCY)
Facility: HOSPITAL | Age: 56
Discharge: HOME | End: 2021-12-07
Attending: EMERGENCY MEDICINE | Admitting: EMERGENCY MEDICINE
Payer: COMMERCIAL

## 2021-12-06 VITALS
HEART RATE: 89 BPM | BODY MASS INDEX: 39.28 KG/M2 | TEMPERATURE: 98.4 F | SYSTOLIC BLOOD PRESSURE: 146 MMHG | DIASTOLIC BLOOD PRESSURE: 93 MMHG | OXYGEN SATURATION: 100 % | RESPIRATION RATE: 25 BRPM | WEIGHT: 207.89 LBS

## 2021-12-06 DIAGNOSIS — M51.16 LUMBAR DISC DISEASE WITH RADICULOPATHY: ICD-10-CM

## 2021-12-06 DIAGNOSIS — R41.82 ALTERED MENTAL STATUS: ICD-10-CM

## 2021-12-06 DIAGNOSIS — F41.0 PANIC ATTACK: Primary | ICD-10-CM

## 2021-12-06 DIAGNOSIS — Z00.8 MEDICAL CLEARANCE FOR PSYCHIATRIC ADMISSION: ICD-10-CM

## 2021-12-06 PROCEDURE — 93005 ELECTROCARDIOGRAM TRACING: CPT

## 2021-12-06 PROCEDURE — 99285 EMERGENCY DEPT VISIT HI MDM: CPT | Performed by: EMERGENCY MEDICINE

## 2021-12-06 PROCEDURE — 96374 THER/PROPH/DIAG INJ IV PUSH: CPT

## 2021-12-06 PROCEDURE — 99283 EMERGENCY DEPT VISIT LOW MDM: CPT

## 2021-12-06 RX ORDER — POTASSIUM CHLORIDE 20 MEQ/1
20 TABLET, EXTENDED RELEASE ORAL DAILY
Qty: 90 TABLET | Refills: 1 | Status: ON HOLD | OUTPATIENT
Start: 2021-12-06 | End: 2022-03-25

## 2021-12-06 RX ORDER — LORAZEPAM 2 MG/ML
1 INJECTION INTRAMUSCULAR ONCE
Status: COMPLETED | OUTPATIENT
Start: 2021-12-06 | End: 2021-12-06

## 2021-12-06 RX ORDER — LORAZEPAM 2 MG/ML
0.5 INJECTION INTRAMUSCULAR ONCE
Status: COMPLETED | OUTPATIENT
Start: 2021-12-07 | End: 2021-12-07

## 2021-12-06 RX ORDER — PROPRANOLOL HYDROCHLORIDE 20 MG/1
20 TABLET ORAL
Qty: 90 TABLET | Refills: 0 | Status: SHIPPED | OUTPATIENT
Start: 2021-12-06 | End: 2022-01-18

## 2021-12-06 RX ORDER — ASPIRIN 81 MG/1
81 TABLET, CHEWABLE ORAL DAILY
Qty: 30 TABLET | Refills: 0 | Status: SHIPPED | OUTPATIENT
Start: 2021-12-06 | End: 2022-01-03

## 2021-12-06 RX ORDER — OXYCODONE HYDROCHLORIDE AND ACETAMINOPHEN 5; 325 MG/1; MG/1
1 TABLET ORAL 2 TIMES DAILY
Qty: 14 TABLET | Refills: 0 | Status: SHIPPED | OUTPATIENT
Start: 2021-12-06 | End: 2021-12-16 | Stop reason: SDUPTHER

## 2021-12-06 RX ADMIN — LORAZEPAM 1 MG: 2 INJECTION INTRAMUSCULAR; INTRAVENOUS at 22:50

## 2021-12-07 LAB
ATRIAL RATE: 312 BPM
ATRIAL RATE: 74 BPM
P AXIS: 80 DEGREES
PR INTERVAL: 134 MS
QRS AXIS: 89 DEGREES
QRS AXIS: 95 DEGREES
QRSD INTERVAL: 88 MS
QRSD INTERVAL: 92 MS
QT INTERVAL: 442 MS
QT INTERVAL: 454 MS
QTC INTERVAL: 490 MS
QTC INTERVAL: 500 MS
T WAVE AXIS: 24 DEGREES
T WAVE AXIS: 50 DEGREES
VENTRICULAR RATE: 73 BPM
VENTRICULAR RATE: 74 BPM

## 2021-12-07 PROCEDURE — 93010 ELECTROCARDIOGRAM REPORT: CPT | Performed by: INTERNAL MEDICINE

## 2021-12-07 PROCEDURE — 96376 TX/PRO/DX INJ SAME DRUG ADON: CPT

## 2021-12-07 RX ADMIN — LORAZEPAM 0.5 MG: 2 INJECTION INTRAMUSCULAR; INTRAVENOUS at 00:03

## 2021-12-09 ENCOUNTER — OFFICE VISIT (OUTPATIENT)
Dept: NEUROSURGERY | Facility: CLINIC | Age: 56
End: 2021-12-09
Payer: COMMERCIAL

## 2021-12-09 ENCOUNTER — HOSPITAL ENCOUNTER (EMERGENCY)
Facility: HOSPITAL | Age: 56
Discharge: HOME | End: 2021-12-09
Attending: EMERGENCY MEDICINE | Admitting: EMERGENCY MEDICINE
Payer: COMMERCIAL

## 2021-12-09 VITALS
SYSTOLIC BLOOD PRESSURE: 164 MMHG | OXYGEN SATURATION: 98 % | DIASTOLIC BLOOD PRESSURE: 94 MMHG | WEIGHT: 206 LBS | BODY MASS INDEX: 40.44 KG/M2 | RESPIRATION RATE: 20 BRPM | HEIGHT: 60 IN | HEART RATE: 74 BPM | TEMPERATURE: 96.4 F

## 2021-12-09 VITALS
SYSTOLIC BLOOD PRESSURE: 140 MMHG | BODY MASS INDEX: 38.89 KG/M2 | HEIGHT: 61 IN | DIASTOLIC BLOOD PRESSURE: 95 MMHG | TEMPERATURE: 97.8 F | HEART RATE: 87 BPM | RESPIRATION RATE: 16 BRPM | WEIGHT: 206 LBS

## 2021-12-09 DIAGNOSIS — F41.9 ANXIETY: Primary | ICD-10-CM

## 2021-12-09 DIAGNOSIS — Z96.89 STATUS POST INSERTION OF SPINAL CORD STIMULATOR: Primary | ICD-10-CM

## 2021-12-09 DIAGNOSIS — F41.0 ANXIETY ATTACK: ICD-10-CM

## 2021-12-09 DIAGNOSIS — R25.1 TREMOR: ICD-10-CM

## 2021-12-09 DIAGNOSIS — M54.16 LUMBAR RADICULOPATHY: ICD-10-CM

## 2021-12-09 DIAGNOSIS — G89.4 CHRONIC PAIN DISORDER: ICD-10-CM

## 2021-12-09 PROCEDURE — 99283 EMERGENCY DEPT VISIT LOW MDM: CPT

## 2021-12-09 PROCEDURE — 1036F TOBACCO NON-USER: CPT | Performed by: NURSE PRACTITIONER

## 2021-12-09 PROCEDURE — 99213 OFFICE O/P EST LOW 20 MIN: CPT | Performed by: NURSE PRACTITIONER

## 2021-12-09 PROCEDURE — 99285 EMERGENCY DEPT VISIT HI MDM: CPT | Performed by: EMERGENCY MEDICINE

## 2021-12-09 PROCEDURE — 96372 THER/PROPH/DIAG INJ SC/IM: CPT

## 2021-12-09 PROCEDURE — 3008F BODY MASS INDEX DOCD: CPT | Performed by: NURSE PRACTITIONER

## 2021-12-09 PROCEDURE — 3008F BODY MASS INDEX DOCD: CPT | Performed by: INTERNAL MEDICINE

## 2021-12-09 RX ORDER — BENZTROPINE MESYLATE 1 MG/ML
1 INJECTION INTRAMUSCULAR; INTRAVENOUS ONCE
Status: COMPLETED | OUTPATIENT
Start: 2021-12-09 | End: 2021-12-09

## 2021-12-09 RX ORDER — LORAZEPAM 2 MG/ML
1 INJECTION INTRAMUSCULAR ONCE
Status: COMPLETED | OUTPATIENT
Start: 2021-12-09 | End: 2021-12-09

## 2021-12-09 RX ADMIN — BENZTROPINE MESYLATE 1 MG: 1 INJECTION, SOLUTION INTRAMUSCULAR; INTRAVENOUS at 14:55

## 2021-12-09 RX ADMIN — LORAZEPAM 1 MG: 2 INJECTION INTRAMUSCULAR; INTRAVENOUS at 16:45

## 2021-12-09 RX ADMIN — LORAZEPAM 1 MG: 2 INJECTION INTRAMUSCULAR; INTRAVENOUS at 13:27

## 2021-12-10 ENCOUNTER — TELEPHONE (OUTPATIENT)
Dept: INTERNAL MEDICINE CLINIC | Facility: CLINIC | Age: 56
End: 2021-12-10

## 2021-12-16 ENCOUNTER — TELEPHONE (OUTPATIENT)
Dept: INTERNAL MEDICINE CLINIC | Facility: CLINIC | Age: 56
End: 2021-12-16

## 2021-12-16 DIAGNOSIS — M51.16 LUMBAR DISC DISEASE WITH RADICULOPATHY: ICD-10-CM

## 2021-12-17 ENCOUNTER — PATIENT OUTREACH (OUTPATIENT)
Dept: INTERNAL MEDICINE CLINIC | Facility: CLINIC | Age: 56
End: 2021-12-17

## 2021-12-17 RX ORDER — OXYCODONE HYDROCHLORIDE AND ACETAMINOPHEN 5; 325 MG/1; MG/1
1 TABLET ORAL DAILY
Qty: 7 TABLET | Refills: 0 | Status: SHIPPED | OUTPATIENT
Start: 2021-12-17 | End: 2021-12-23 | Stop reason: SDUPTHER

## 2021-12-23 ENCOUNTER — TELEPHONE (OUTPATIENT)
Dept: NEUROSURGERY | Facility: CLINIC | Age: 56
End: 2021-12-23

## 2021-12-23 DIAGNOSIS — M51.16 LUMBAR DISC DISEASE WITH RADICULOPATHY: ICD-10-CM

## 2021-12-23 RX ORDER — OXYCODONE HYDROCHLORIDE AND ACETAMINOPHEN 5; 325 MG/1; MG/1
1 TABLET ORAL EVERY OTHER DAY
Qty: 4 TABLET | Refills: 0 | Status: SHIPPED | OUTPATIENT
Start: 2021-12-23 | End: 2022-02-28 | Stop reason: CLARIF

## 2021-12-23 NOTE — TELEPHONE ENCOUNTER
Due 12/24/21    Please change directions to once every other day for one week as this is patients last script

## 2021-12-23 NOTE — TELEPHONE ENCOUNTER
Patient made aware script sent to the pharmacy  I made her aware this is for one tablet every other day for a total of 4 pills  I reviewed again that this is her last prescription for narcotics   She verbalized understanding

## 2021-12-24 RX ORDER — LIDOCAINE 4% 4 G/100G
CREAM TOPICAL
Qty: 30 G | Refills: 1 | Status: SHIPPED | OUTPATIENT
Start: 2021-12-24 | End: 2022-03-17 | Stop reason: HOSPADM

## 2021-12-29 ENCOUNTER — TELEPHONE (OUTPATIENT)
Dept: OBGYN CLINIC | Facility: HOSPITAL | Age: 56
End: 2021-12-29

## 2022-01-03 ENCOUNTER — APPOINTMENT (OUTPATIENT)
Dept: RADIOLOGY | Facility: MEDICAL CENTER | Age: 57
End: 2022-01-03
Payer: MEDICARE

## 2022-01-03 ENCOUNTER — OFFICE VISIT (OUTPATIENT)
Dept: OBGYN CLINIC | Facility: MEDICAL CENTER | Age: 57
End: 2022-01-03
Payer: MEDICARE

## 2022-01-03 VITALS
HEIGHT: 60 IN | SYSTOLIC BLOOD PRESSURE: 137 MMHG | BODY MASS INDEX: 40.64 KG/M2 | HEART RATE: 80 BPM | TEMPERATURE: 97.3 F | WEIGHT: 207 LBS | DIASTOLIC BLOOD PRESSURE: 76 MMHG

## 2022-01-03 DIAGNOSIS — E66.01 MORBID OBESITY WITH BMI OF 40.0-44.9, ADULT (HCC): ICD-10-CM

## 2022-01-03 DIAGNOSIS — Z01.818 PREOPERATIVE TESTING: ICD-10-CM

## 2022-01-03 DIAGNOSIS — M17.0 PRIMARY OSTEOARTHRITIS OF BOTH KNEES: ICD-10-CM

## 2022-01-03 DIAGNOSIS — M17.0 PRIMARY OSTEOARTHRITIS OF BOTH KNEES: Primary | ICD-10-CM

## 2022-01-03 DIAGNOSIS — Z01.818 ENCOUNTER FOR PREADMISSION TESTING: ICD-10-CM

## 2022-01-03 DIAGNOSIS — R41.82 ALTERED MENTAL STATUS: ICD-10-CM

## 2022-01-03 DIAGNOSIS — M17.11 PRIMARY OSTEOARTHRITIS OF RIGHT KNEE: Primary | ICD-10-CM

## 2022-01-03 PROCEDURE — 73560 X-RAY EXAM OF KNEE 1 OR 2: CPT

## 2022-01-03 PROCEDURE — 3008F BODY MASS INDEX DOCD: CPT | Performed by: NURSE PRACTITIONER

## 2022-01-03 PROCEDURE — 99213 OFFICE O/P EST LOW 20 MIN: CPT | Performed by: ORTHOPAEDIC SURGERY

## 2022-01-03 RX ORDER — CEFAZOLIN SODIUM 2 G/50ML
2000 SOLUTION INTRAVENOUS ONCE
Status: CANCELLED | OUTPATIENT
Start: 2022-03-09 | End: 2022-01-03

## 2022-01-03 RX ORDER — FOLIC ACID 1 MG/1
1 TABLET ORAL DAILY
Qty: 30 TABLET | Refills: 1 | Status: SHIPPED | OUTPATIENT
Start: 2022-01-03 | End: 2022-06-28

## 2022-01-03 RX ORDER — FERROUS SULFATE TAB EC 324 MG (65 MG FE EQUIVALENT) 324 (65 FE) MG
324 TABLET DELAYED RESPONSE ORAL DAILY
Qty: 30 TABLET | Refills: 1 | Status: SHIPPED | OUTPATIENT
Start: 2022-01-03 | End: 2022-01-31

## 2022-01-03 RX ORDER — LORATADINE 10 MG
TABLET ORAL
Qty: 30 TABLET | Refills: 0 | Status: SHIPPED | OUTPATIENT
Start: 2022-01-03 | End: 2022-01-31

## 2022-01-03 RX ORDER — CHLORHEXIDINE GLUCONATE 0.12 MG/ML
15 RINSE ORAL ONCE
Status: CANCELLED | OUTPATIENT
Start: 2022-03-09 | End: 2022-01-03

## 2022-01-03 RX ORDER — ACETAMINOPHEN 325 MG/1
975 TABLET ORAL ONCE
Status: CANCELLED | OUTPATIENT
Start: 2022-03-09 | End: 2022-01-03

## 2022-01-03 RX ORDER — SODIUM CHLORIDE 9 MG/ML
75 INJECTION, SOLUTION INTRAVENOUS CONTINUOUS
Status: CANCELLED | OUTPATIENT
Start: 2022-03-09

## 2022-01-03 RX ORDER — ASCORBATE CALCIUM 500 MG
500 TABLET ORAL DAILY
Qty: 30 TABLET | Refills: 1 | Status: SHIPPED | OUTPATIENT
Start: 2022-01-03 | End: 2022-06-28

## 2022-01-03 RX ORDER — MULTIVITAMIN
1 TABLET ORAL DAILY
Qty: 30 TABLET | Refills: 1 | Status: SHIPPED | OUTPATIENT
Start: 2022-01-03 | End: 2022-02-28

## 2022-01-03 RX ORDER — CHLORHEXIDINE GLUCONATE 4 G/100ML
SOLUTION TOPICAL DAILY PRN
Status: CANCELLED | OUTPATIENT
Start: 2022-01-03

## 2022-01-03 NOTE — PROGRESS NOTES
Assessment/Plan:  1  Primary osteoarthritis of right knee    2  Primary osteoarthritis of both knees    3  Morbid obesity with BMI of 40 0-44 9, adult ([facility] [address] )    4  Encounter for preadmission testing    5  Preoperative testing      Orders Placed This Encounter   Procedures    PAT Covid Screening    XR knee 1 or 2 vw left    XR knee 1 or 2 vw right    Comprehensive metabolic panel    Hemoglobin A1C W/EAG Estimation    CBC and differential    C-reactive protein    Anemia Panel w/Reflex    Protime-INR    APTT    Ambulatory referral to [address] [facility] referral to Physical Therapy    Type and screen     - Samantha was seen today for severe arthritis right worse than left   - The patient has severe right knee arthritis  We discussed treatment options as well as risks and benefits of treatment options  The patient has intolerable pain, feels limited and has tried and failed conservative treatment options at this point  They have elected to proceed with a right TKA  The risks of surgery include, but are not limited to infection, blood clot, wound healing problems, blood loss, damage to blood vessels and nerves, persistent pain and stiffness, fracture, need for additional surgery, need for revision surgery, failure of hardware, heart attack, stroke, death  The patient understood and agreed to by oral and written consent  I answered all questions regarding surgery  The patient has the direct phone number to the office for any further questions   - DVT prophylaxis: ASA  - Clearance will be obtained from: PCP   -She is not a smoker and not diabetic  Patient does not have teeth  She does not have family or personal history of DVT  No personal history of HIV or hepatitis C  She has not been vaccinated for COVID  No history of MRSA    She does not see a cardiologist   - Discussed weight loss, 3 lb goal for surgery to bring BMI <40 and reduce risk for complications  - She understands she will be at increased risk for persistent pain given her history of fibromyalgia  - I also made her aware of the rigors of exercise after surgery and that in order to have a good result she will need to dedicate herself to a home exercise program in addition to physical therapy  Return for Post-op  I answered all of the patient's questions during the visit and provided education of the patient's condition during the visit  The patient verbalized understanding of the information given and agrees with the plan  This note was dictated using [employer] Voice [employer]ognition [employer] software  It may contain errors including improperly dictated words  Please contact physician directly for any questions  Subjective   Chief Complaint:   Chief Complaint   Patient presents with    Left Knee - Pain    Right Knee - Pain       HPI  Samantha QUINONEZ Mahi Romo is a 60 y o  female who presents for follow up for bilateral knee osteoarthritis  She was last seen in the office August 2020  More recently she has been seen by Dr Gustavo Armas and Dr Ian Estrada within the orthopedic practice  She is treated with many steroid injections in the past   She most recently had one for both knees on 11/26/2021  She states that this is currently not helping her at this time  She localizes her pain diffusely about the anterior knee  She cannot determine which knee is worse than the other at this time  Her pain is made worse with prolonged walking or standing  She uses a walker for assistance  She states she is not currently taking narcotics but is taking Lyrica and Tylenol    She has had physical therapy in the past but none recent  She is interested in discussing surgery  She is not a smoker and not diabetic  Patient does not have teeth  She does not have family or personal history of DVT  No personal history of HIV or hepatitis C  She has not been vaccinated for COVID  No history of MRSA    She does not see a cardiologist     Review of Systems  ROS:    See HPI for musculoskeletal review  All other systems reviewed are negative     History:  Past Medical History:   Diagnosis Date    Acid reflux     Anxiety     RESOLVED: [id number]    Arthritis     Bipolar 2 disorder (HCC)     FOLLOWS WITH PSYCHIATRIST  CONTINUE LAMOTRIGINE; RESOLVED: [id number]    Depression     Familial tremor     both hands    Fibromyalgia     LAST ASSESSED: [id number]    Hearing aid worn     left ear    [place] (hard of hearing)     left ear    Hypertension     Left-sided weakness     Lower back pain     Memory loss of unknown cause     long and short term    Migraine     Obesity     Obesity, Class II, BMI 35-39 9     Overactive bladder     Panic attack     Post traumatic stress disorder     Seasonal allergies     Stroke [facility])     questionable stroke 2009    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    CONT ASA 81 MG DAILY; RESOLVED: [id number]    Urinary incontinence     Wears dentures     partial lower / full upper    Wears glasses      Past Surgical History:   Procedure Laterality Date    BACK SURGERY       SECTION      COLONOSCOPY      RESOLVED: [id number]    EAR SURGERY      EGD      HYSTERECTOMY      MYRINGOTOMY W/ TUBES Left     NECK SURGERY  2019    LA CYSTOURETHROSCOPY N/A 2016    Procedure: CYSTOSCOPY, BOTOX INJECTION;  Surgeon: Mulu Patel MD;  Location: [facility];  Service: Gynecology    LA IMPLANT SPINAL NEUROSTIM/ Right 2/10/2021    Procedure: REPLACEMENT IMPLANTABLE PULSE GENERATOR DORSAL SPINAL COLUMN STIMULATOR, RIGHT;  Surgeon: Britney John MD;  Location:  MAIN OR;  Service: Neurosurgery    LA PERCUT IMPLNT Delford Elliott Right 2020    Procedure: INSERTION THORACIC DORSAL COLUMN SPINAL CORD STIMULATOR PERCUTANEOUS W IMPLANTABLE PULSE GENERATOR, RIGHT;  Surgeon: Britney John MD;  Location:  MAIN OR;  Service: Neurosurgery    [address] [place] [address]    UPPER GASTROINTESTINAL ENDOSCOPY 09/2020     Social History   Social History     Substance and Sexual Activity   Alcohol Use Not Currently    Comment: 2 x year; being a social drinker as per all scripts      Social History     Substance and Sexual Activity   Drug Use No     Social History     Tobacco Use   Smoking Status Never Smoker   Smokeless Tobacco Never Used     Family History:   Family History   Problem Relation Age of Onset    Colon cancer Mother     Alzheimer's disease Father     Stroke Father     Colon cancer Brother     Bipolar disorder Brother     Breast cancer Maternal Aunt     Colon cancer Maternal Aunt     Heart disease Other     Diabetes Other     Hypertension Other     Seizures Son     Depression Paternal Grandfather     No Known Problems Sister     No Known Problems Brother     Thyroid disease Neg Hx        Current Outpatient Medications on File Prior to Visit   Medication Sig Dispense Refill    amLODIPine (NORVASC) 5 mg tablet Take 1 tablet (5 mg total) by mouth daily 90 tablet 3    AneCream 4 % cream Please specify directions, refills and quantity 30 g 1    aspirin (CVS Aspirin Adult Low Dose) 81 mg chewable tablet Chew 1 tablet (81 mg total) daily 30 tablet 0    atorvastatin (LIPITOR) 40 mg tablet TAKE 1 TABLET BY MOUTH DAILY WITH DINNER 90 tablet 1    busPIRone (BUSPAR) 10 mg tablet       busPIRone (BUSPAR) 15 mg tablet Take 1 tablet (15 mg total) by mouth 3 (three) times a day 90 tablet 0    CVS Vitamin C 500 MG tablet TAKE 1 TABLET BY MOUTH TWICE A DAY (Patient not taking: Reported on 11/10/2021) 60 tablet 0    Diapers & Supplies (Huggies Pull-Ups) MISC Use 3 (three) times a day (Patient not taking: Reported on 11/10/2021 ) 100 each 3    Diclofenac Sodium (VOLTAREN) 1 % APPLY 2 GRAMS TO AFFECTED AREA 4 TIMES A  g 5    ferrous sulfate 324 (65 Fe) mg TAKE 1 TABLET (324 MG TOTAL) BY MOUTH 2 (TWO) TIMES A DAY BEFORE MEALS (Patient not taking: Reported on 11/10/2021 ) 60 tablet 1    folic acid (FOLVITE) 1 mg tablet TAKE 1 TABLET BY MOUTH EVERY DAY (Patient not taking: Reported on 11/10/2021) 30 tablet 1    hydrOXYzine HCL (ATARAX) 50 mg tablet Take 50 mg by mouth 3 (three) times a day as needed for itching      lithium carbonate (LITHOBID) 300 mg CR tablet Take 1 tablet (300 mg total) by mouth daily at bedtime 30 tablet 0    LORazepam (ATIVAN) 0 5 mg tablet Take 1 tablet (0 5 mg total) by mouth 2 (two) times a day as needed for anxiety  0    methocarbamol (ROBAXIN) 500 mg tablet Take 1 tablet (500 mg total) by mouth every 6 (six) hours as needed for muscle spasms 30 tablet 0    mirtazapine (REMERON) 15 mg tablet       mirtazapine (REMERON) 7 5 MG tablet Take 1 tablet (7 5 mg total) by mouth daily at bedtime as needed (insomnia) 30 tablet 0    naloxone (NARCAN) 4 mg/0 1 mL nasal spray Administer 1 spray into a nostril  If no response after 2-3 minutes, give another dose in the other nostril using a new spray   (Patient not taking: Reported on 12/9/2021 ) 1 each 1    omeprazole (PriLOSEC) 20 mg delayed release capsule  (Patient not taking: Reported on 12/9/2021 )      oxyCODONE-acetaminophen (Percocet) 5-325 mg per tablet Take 1 tablet by mouth every other day Max Daily Amount: 1 tablet 4 tablet 0    pantoprazole (PROTONIX) 20 mg tablet TAKE 1 TABLET (20 MG TOTAL) BY MOUTH DAILY IN THE EARLY MORNING (Patient not taking: Reported on 11/10/2021 ) 30 tablet 0    PARoxetine (PAXIL) 30 mg tablet Take 2 tablets (60 mg total) by mouth daily 60 tablet 0    potassium chloride (K-DUR,KLOR-CON) 20 mEq tablet Take 1 tablet (20 mEq total) by mouth daily 90 tablet 1    prazosin (MINIPRESS) 2 mg capsule TAKE 1 CAPSULE BY MOUTH EVERY DAY 30 capsule 1    pregabalin (LYRICA) 150 mg capsule Take 1 capsule (150 mg total) by mouth 3 (three) times a day 120 capsule 1    propranolol (INDERAL) 20 mg tablet Take 1 tablet (20 mg total) by mouth 2 (two) times a day before breakfast and lunch 90 tablet 0    QUEtiapine (SEROquel) 25 mg tablet Take 2 tablets (50 mg total) by mouth 4 (four) times daily (after meals and at bedtime) 120 tablet 0    Respiratory Therapy Supplies (Nebulizer) YUDY Use as needed (Shortness of breath) (Patient not taking: Reported on 4/20/2021) 1 each 0    senna-docusate sodium (SENOKOT S) 8 6-50 mg per tablet Take 1 tablet by mouth daily as needed for constipation 30 tablet 0    Valbenazine Tosylate (Ingrezza) 80 MG CAPS Take 80 mg by mouth daily 90 capsule 0     No current facility-administered medications on file prior to visit       No Known Allergies     Objective     /76   Pulse 80   Temp (!) 97 3 °F (36 3 °C)   Ht 5' (1 524 m)   Wt 93 9 kg (207 lb)   BMI 40 43 kg/m²      PE:  AAOx 3  WDWN  Hearing intact, no drainage from eyes  no audible wheezing  no abdominal distension  LE compartments soft, skin intact    Ortho Exam:  bilateral Knee:   No erythema  Valgus deformity   no swelling  no effusion  no warmth  AROM: R knee: , L knee:   PROM: R knee: 5-115, L knee:   Stable to varus/valgus stress R knee    Imaging Studies: I have personally reviewed pertinent films in PACS  XR bilateral knee:  Severe osteoarthritis with loss of joint space      Scribe Attestation    I,:  Dominguez Goetz MA am acting as a scribe while in the presence of the attending physician :       I,:  Jude Neri DO personally performed the services described in this documentation    as scribed in my presence :

## 2022-01-03 NOTE — PROGRESS NOTES
Assessment/Plan     1  Primary osteoarthritis of both knees      Orders Placed This Encounter   Procedures    XR knee 1 or 2 vw left    XR knee 1 or 2 vw right       No follow-ups on file  I answered all of the patient's questions during the visit and provided education of the patient's condition during the visit  The patient verbalized understanding of the information given and agrees with the plan  This note was dictated using [employer] software  It may contain errors including improperly dictated words  Please contact physician directly for any questions  History of Present Illness   Chief complaint:   Chief Complaint   Patient presents with    Left Knee - Pain    Right Knee - Pain       HPI: Melody D Cherise Saint is a 60 y o  female that c/o {right/left/bilateral:21223} knee pain  ROS:    See HPI for musculoskeletal review  All other systems reviewed are negative     Historical Information   Past Medical History:   Diagnosis Date    Acid reflux     Anxiety     RESOLVED: [id number]    Arthritis     Bipolar 2 disorder (HCC)     FOLLOWS WITH PSYCHIATRIST  CONTINUE LAMOTRIGINE; RESOLVED: [id number]    Depression     Familial tremor     both hands    Fibromyalgia     LAST ASSESSED: [id number]    Hearing aid worn     left ear    [place] (hard of hearing)     left ear    Hypertension     Left-sided weakness     Lower back pain     Memory loss of unknown cause     long and short term    Migraine     Obesity     Obesity, Class II, BMI 35-39 9     Overactive bladder     Panic attack     Post traumatic stress disorder     Seasonal allergies     Stroke [facility])     questionable stroke 2009    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    CONT ASA 81 MG DAILY; RESOLVED: [id number]    Urinary incontinence     Wears dentures     partial lower / full upper    Wears glasses      Past Surgical History:   Procedure Laterality Date   [facility] COLONOSCOPY      RESOLVED: [id number]    EAR SURGERY      EGD      HYSTERECTOMY  2004    MYRINGOTOMY W/ TUBES Left     NECK SURGERY  04/2019    MT CYSTOURETHROSCOPY N/A 2/18/2016    Procedure: CYSTOSCOPY, BOTOX INJECTION;  Surgeon: Ezequiel Shore MD;  Location: [facility];  Service: Gynecology    MT IMPLANT SPINAL NEUROSTIM/ Right 2/10/2021    Procedure: REPLACEMENT IMPLANTABLE PULSE GENERATOR DORSAL SPINAL COLUMN STIMULATOR, RIGHT;  Surgeon: Silvia Grover MD;  [facility];  Service: Neurosurgery    MT PERCUT IMPLNT Kassy Lester Right 7/28/2020    Procedure: INSERTION THORACIC DORSAL COLUMN SPINAL CORD STIMULATOR PERCUTANEOUS W IMPLANTABLE PULSE GENERATOR, RIGHT;  Surgeon: Silvia Grover MD;  Location:  MAIN OR;  Service: Neurosurgery   [facility]    UPPER GASTROINTESTINAL ENDOSCOPY  09/2020     Social History   Social History     Substance and Sexual Activity   Alcohol Use Not Currently    Comment: 2 x year; being a social drinker as per all scripts      Social History     Substance and Sexual Activity   Drug Use No     Social History     Tobacco Use   Smoking Status Never Smoker   Smokeless Tobacco Never Used     Family History:   Family History   Problem Relation Age of Onset    Colon cancer Mother     Alzheimer's disease Father     Stroke Father     Colon cancer Brother     Bipolar disorder Brother     Breast cancer Maternal Aunt     Colon cancer Maternal Aunt     Heart disease Other     Diabetes Other     Hypertension Other     Seizures Son     Depression Paternal Grandfather     No Known Problems Sister     No Known Problems Brother     Thyroid disease Neg Hx        Current Outpatient Medications on File Prior to Visit   Medication Sig Dispense Refill    amLODIPine (NORVASC) 5 mg tablet Take 1 tablet (5 mg total) by mouth daily 90 tablet 3    AneCream 4 % cream Please specify directions, refills and quantity 30 g 1    aspirin (CVS Aspirin Adult Low Dose) 81 mg chewable tablet Chew 1 tablet (81 mg total) daily 30 tablet 0    atorvastatin (LIPITOR) 40 mg tablet TAKE 1 TABLET BY MOUTH DAILY WITH DINNER 90 tablet 1    busPIRone (BUSPAR) 10 mg tablet       busPIRone (BUSPAR) 15 mg tablet Take 1 tablet (15 mg total) by mouth 3 (three) times a day 90 tablet 0    CVS Vitamin C 500 MG tablet TAKE 1 TABLET BY MOUTH TWICE A DAY (Patient not taking: Reported on 11/10/2021) 60 tablet 0    Diapers & Supplies (Huggies Pull-Ups) MISC Use 3 (three) times a day (Patient not taking: Reported on 11/10/2021 ) 100 each 3    Diclofenac Sodium (VOLTAREN) 1 % APPLY 2 GRAMS TO AFFECTED AREA 4 TIMES A  g 5    ferrous sulfate 324 (65 Fe) mg TAKE 1 TABLET (324 MG TOTAL) BY MOUTH 2 (TWO) TIMES A DAY BEFORE MEALS (Patient not taking: Reported on 11/10/2021 ) 60 tablet 1    folic acid (FOLVITE) 1 mg tablet TAKE 1 TABLET BY MOUTH EVERY DAY (Patient not taking: Reported on 11/10/2021) 30 tablet 1    hydrOXYzine HCL (ATARAX) 50 mg tablet Take 50 mg by mouth 3 (three) times a day as needed for itching      lithium carbonate (LITHOBID) 300 mg CR tablet Take 1 tablet (300 mg total) by mouth daily at bedtime 30 tablet 0    LORazepam (ATIVAN) 0 5 mg tablet Take 1 tablet (0 5 mg total) by mouth 2 (two) times a day as needed for anxiety  0    methocarbamol (ROBAXIN) 500 mg tablet Take 1 tablet (500 mg total) by mouth every 6 (six) hours as needed for muscle spasms 30 tablet 0    mirtazapine (REMERON) 15 mg tablet       mirtazapine (REMERON) 7 5 MG tablet Take 1 tablet (7 5 mg total) by mouth daily at bedtime as needed (insomnia) 30 tablet 0    naloxone (NARCAN) 4 mg/0 1 mL nasal spray Administer 1 spray into a nostril  If no response after 2-3 minutes, give another dose in the other nostril using a new spray   (Patient not taking: Reported on 12/9/2021 ) 1 each 1    omeprazole (PriLOSEC) 20 mg delayed release capsule  (Patient not taking: Reported on 12/9/2021 )      oxyCODONE-acetaminophen (Percocet) 5-325 mg per tablet Take 1 tablet by mouth every other day Max Daily Amount: 1 tablet 4 tablet 0    pantoprazole (PROTONIX) 20 mg tablet TAKE 1 TABLET (20 MG TOTAL) BY MOUTH DAILY IN THE EARLY MORNING (Patient not taking: Reported on 11/10/2021 ) 30 tablet 0    PARoxetine (PAXIL) 30 mg tablet Take 2 tablets (60 mg total) by mouth daily 60 tablet 0    potassium chloride (K-DUR,KLOR-CON) 20 mEq tablet Take 1 tablet (20 mEq total) by mouth daily 90 tablet 1    prazosin (MINIPRESS) 2 mg capsule TAKE 1 CAPSULE BY MOUTH EVERY DAY 30 capsule 1    pregabalin (LYRICA) 150 mg capsule Take 1 capsule (150 mg total) by mouth 3 (three) times a day 120 capsule 1    propranolol (INDERAL) 20 mg tablet Take 1 tablet (20 mg total) by mouth 2 (two) times a day before breakfast and lunch 90 tablet 0    QUEtiapine (SEROquel) 25 mg tablet Take 2 tablets (50 mg total) by mouth 4 (four) times daily (after meals and at bedtime) 120 tablet 0    Respiratory Therapy Supplies (Nebulizer) YUDY Use as needed (Shortness of breath) (Patient not taking: Reported on 4/20/2021) 1 each 0    senna-docusate sodium (SENOKOT S) 8 6-50 mg per tablet Take 1 tablet by mouth daily as needed for constipation 30 tablet 0    Valbenazine Tosylate (Ingrezza) 80 MG CAPS Take 80 mg by mouth daily 90 capsule 0     No current facility-administered medications on file prior to visit       No Known Allergies    Current Outpatient Medications on File Prior to Visit   Medication Sig Dispense Refill    amLODIPine (NORVASC) 5 mg tablet Take 1 tablet (5 mg total) by mouth daily 90 tablet 3    AneCream 4 % cream Please specify directions, refills and quantity 30 g 1    aspirin (CVS Aspirin Adult Low Dose) 81 mg chewable tablet Chew 1 tablet (81 mg total) daily 30 tablet 0    atorvastatin (LIPITOR) 40 mg tablet TAKE 1 TABLET BY MOUTH DAILY WITH DINNER 90 tablet 1    busPIRone (BUSPAR) 10 mg tablet       busPIRone (BUSPAR) 15 mg tablet Take 1 tablet (15 mg total) by mouth 3 (three) times a day 90 tablet 0    CVS Vitamin C 500 MG tablet TAKE 1 TABLET BY MOUTH TWICE A DAY (Patient not taking: Reported on 11/10/2021) 60 tablet 0    Diapers & Supplies (Huggies Pull-Ups) MISC Use 3 (three) times a day (Patient not taking: Reported on 11/10/2021 ) 100 each 3    Diclofenac Sodium (VOLTAREN) 1 % APPLY 2 GRAMS TO AFFECTED AREA 4 TIMES A  g 5    ferrous sulfate 324 (65 Fe) mg TAKE 1 TABLET (324 MG TOTAL) BY MOUTH 2 (TWO) TIMES A DAY BEFORE MEALS (Patient not taking: Reported on 11/10/2021 ) 60 tablet 1    folic acid (FOLVITE) 1 mg tablet TAKE 1 TABLET BY MOUTH EVERY DAY (Patient not taking: Reported on 11/10/2021) 30 tablet 1    hydrOXYzine HCL (ATARAX) 50 mg tablet Take 50 mg by mouth 3 (three) times a day as needed for itching      lithium carbonate (LITHOBID) 300 mg CR tablet Take 1 tablet (300 mg total) by mouth daily at bedtime 30 tablet 0    LORazepam (ATIVAN) 0 5 mg tablet Take 1 tablet (0 5 mg total) by mouth 2 (two) times a day as needed for anxiety  0    methocarbamol (ROBAXIN) 500 mg tablet Take 1 tablet (500 mg total) by mouth every 6 (six) hours as needed for muscle spasms 30 tablet 0    mirtazapine (REMERON) 15 mg tablet       mirtazapine (REMERON) 7 5 MG tablet Take 1 tablet (7 5 mg total) by mouth daily at bedtime as needed (insomnia) 30 tablet 0    naloxone (NARCAN) 4 mg/0 1 mL nasal spray Administer 1 spray into a nostril  If no response after 2-3 minutes, give another dose in the other nostril using a new spray   (Patient not taking: Reported on 12/9/2021 ) 1 each 1    omeprazole (PriLOSEC) 20 mg delayed release capsule  (Patient not taking: Reported on 12/9/2021 )      oxyCODONE-acetaminophen (Percocet) 5-325 mg per tablet Take 1 tablet by mouth every other day Max Daily Amount: 1 tablet 4 tablet 0    pantoprazole (PROTONIX) 20 mg tablet TAKE 1 TABLET (20 MG TOTAL) BY MOUTH DAILY IN THE EARLY MORNING (Patient not taking: Reported on 11/10/2021 ) 30 tablet 0    PARoxetine (PAXIL) 30 mg tablet Take 2 tablets (60 mg total) by mouth daily 60 tablet 0    potassium chloride (K-DUR,KLOR-CON) 20 mEq tablet Take 1 tablet (20 mEq total) by mouth daily 90 tablet 1    prazosin (MINIPRESS) 2 mg capsule TAKE 1 CAPSULE BY MOUTH EVERY DAY 30 capsule 1    pregabalin (LYRICA) 150 mg capsule Take 1 capsule (150 mg total) by mouth 3 (three) times a day 120 capsule 1    propranolol (INDERAL) 20 mg tablet Take 1 tablet (20 mg total) by mouth 2 (two) times a day before breakfast and lunch 90 tablet 0    QUEtiapine (SEROquel) 25 mg tablet Take 2 tablets (50 mg total) by mouth 4 (four) times daily (after meals and at bedtime) 120 tablet 0    Respiratory Therapy Supplies (Nebulizer) YUDY Use as needed (Shortness of breath) (Patient not taking: Reported on 4/20/2021) 1 each 0    senna-docusate sodium (SENOKOT S) 8 6-50 mg per tablet Take 1 tablet by mouth daily as needed for constipation 30 tablet 0    Valbenazine Tosylate (Ingrezza) 80 MG CAPS Take 80 mg by mouth daily 90 capsule 0     No current facility-administered medications on file prior to visit  Objective   Vitals: Blood pressure 137/76, pulse 80, temperature (!) 97 3 °F (36 3 °C), height 5' (1 524 m), weight 93 9 kg (207 lb), not currently breastfeeding  ,Body mass index is 40 43 kg/m²      PE:  AAOx 3  WDWN  Hearing intact, no drainage from eyes  Regular rate  no audible wheezing  no abdominal distension  LE compartments soft, skin intact    {right/left/bilateral:77546}knee:    Appearance:  no swelling   No ecchymosis  no obvious joint deformity   No effusion  Palpation/Tenderness:  +TTP over medial joint line  No TTP over lateral joint line   No TTP over patella  No TTP over patellar tendon  No TTP over pes anserine bursa  Active Range of Motion:  AROM: full  Special Tests:  Medial Boris's Test:  Positive  Lateral Boris's Test: Negative  Apley's compression test:  Negative  Lachman's Test:  negative  Anterior Drawer Test:  Negative  Patellar grind:  Negative  Valgus Stress Test:  negative  Varus Stress Test:  negative     No ipsilateral hip pain with ROM    {right/left/bilateral:88172}LE:    Sensation grossly intact  Palpable *** pulse  AT/GS/EHL intact    Imaging Studies: {Results Review Statement:24500}  {right/left/bilateral:21223}knee:        Scribe Attestation    I,:   am acting as a scribe while in the presence of the attending physician :       I,:   personally performed the services described in this documentation    as scribed in my presence :

## 2022-01-11 ENCOUNTER — PATIENT OUTREACH (OUTPATIENT)
Dept: INTERNAL MEDICINE CLINIC | Facility: CLINIC | Age: 57
End: 2022-01-11

## 2022-01-11 NOTE — PROGRESS NOTES
Leoncio Gudino RN/CM received a call from Mr Héctor Borges [phone] pt's  for the [address] [facility] as of 1/17/22  He shared that the pt has been approved for 35/hours per week  She has chosen her son in law [address]  [phone] to be her caregiver  He is working through [employer] [phone] and services should start soon  She has been approved Nye Beatriz as well  Mr Gina Ross also suggested pt consider the [facility]    He shares pt was interested and indicted she may go 2 days per week  If  she agrees to she is approved and can be funded up to 2 days/week  He mentioned pt has indicted that pt may not be established with   Pt had been active with [facility]   HIRAM did call Mr [address]  [phone] from their Program to request an update and to ask if pt has been approved for a [facility] through [facility] as well  SW had to leave a message requesting a return call  HIRAM and Nacho Live RN/CM did call pt as well but had to leave a message for her to return CM call

## 2022-01-11 NOTE — PROGRESS NOTES
Patient returned call to , [place] and I then joined  Patient reports she is not feeling well and believes she has the "flu " Patient reports having a head cold, cough, sneezing, and a headache  Patient reports other sick family members in the house  I did bring up about COVID and patient got very upset and that she did not want to talk about it or not feeling well any further  I did make patient aware that if she starts with any difficulty with her breathing or any fevers that do not go away with tylenol to go to the hospital to be further evaluated  I also encouraged her to call office if symptoms continue and wants a virtual appointment and/or to discuss further  Patient confirms she is connected with [facility]       Patient reports she is happy to be approved for PA waiver program

## 2022-01-11 NOTE — PROGRESS NOTES
Aliyah Currie RN/CM received return call from from pt who confirmed she is getting [address] with [facility]  SW has encouraged her to also f/u and ask about her ICM referral   Pt did share the she and other family members have the FLU  SW has asked if she got tested for the Flu or COVID and she said no  She relates she coughing and sneezing and it feels like a head cold  She does not see the need to get tested  Davin Carrington has cautioned pt if symptoms worsen to call the Office or go to the ED if she has trouble breathing  CM to remain available to assist as indicted

## 2022-01-11 NOTE — PROGRESS NOTES
Outpatient Care Management Note:    , Teagan Butler received call from patient's , Bridgette Monroe with PA waiver program (phone # [phone])  He reports patient approved for PA waiver program and approved for 35 hours a week and her son in [place] will be her paid caregiver  Services approved to start 1/17/22  He reports patient expressed possible interest in the [facility] in near future and may go 2 days a week   did get patient a life alert  He mentioned patient told him she is not connected with mental health  Patient has previously been active with [address]  Teagan Butler and I called patient to follow up with her and have to leave message requesting a call back  Per chart review patient seen by ortho on 1/3/22 and elected to proceed with a right total knee arthroplasty  Surgery scheduled for 3/9/22

## 2022-01-12 ENCOUNTER — TELEPHONE (OUTPATIENT)
Dept: OBGYN CLINIC | Facility: MEDICAL CENTER | Age: 57
End: 2022-01-12

## 2022-01-12 ENCOUNTER — TELEPHONE (OUTPATIENT)
Dept: INTERNAL MEDICINE CLINIC | Facility: CLINIC | Age: 57
End: 2022-01-12

## 2022-01-12 NOTE — TELEPHONE ENCOUNTER
Patient called & LVM on Ortho answering machine regarding what she needs to do to prepare for RTKA with Dr Alonzo Tran in March 2022  I sent surgery scheduler a message for guidance  I called & LVM for patient letting her know we received her message and to expect a call back regarding this subject       # [phone]

## 2022-01-12 NOTE — TELEPHONE ENCOUNTER
Calling back to find out if she can get a pain killer that is not a narcotic   Patient stated she feels like she is getting a panic attack and sweating from the pain

## 2022-01-12 NOTE — TELEPHONE ENCOUNTER
Patient called to state that she takes tylenol and ibuprohen over the counter and it does not work for right foot, flank & back pain  Was on oxycodone and morphine which was discontinued by PCP      Please check into this and call the patient when escript sent to the [facility] pharmacy

## 2022-01-13 ENCOUNTER — TELEPHONE (OUTPATIENT)
Dept: OBGYN CLINIC | Facility: MEDICAL CENTER | Age: 57
End: 2022-01-13

## 2022-01-13 NOTE — TELEPHONE ENCOUNTER
Call blind transferred to   Patient says she did not call office and did not call my direct # and leave me a message either on 1/12 and that she has no questions

## 2022-01-13 NOTE — TELEPHONE ENCOUNTER
----- Message from Winifred Ambriz, [address] sent at 1/12/2022  8:14 AM EST -----  Regarding: Prep for surgery  Good morning,     Patient called & LVM on Ortho answering machine asking what she needs to do to prepare for Right Total Knee Replacement with Dr Leo Doran on 03/09/2022  Patient sounded very confused       Callback # is [phone]

## 2022-01-13 NOTE — TELEPHONE ENCOUNTER
off on 1/12  Returned patient's call; Left message for patient to call me back DIRECTLY  Left my DIRECT phone # 2x on message

## 2022-01-14 DIAGNOSIS — M79.606 CHRONIC LEG PAIN: ICD-10-CM

## 2022-01-14 DIAGNOSIS — G89.29 CHRONIC BILATERAL LOW BACK PAIN WITH BILATERAL SCIATICA: ICD-10-CM

## 2022-01-14 DIAGNOSIS — M54.41 CHRONIC BILATERAL LOW BACK PAIN WITH BILATERAL SCIATICA: ICD-10-CM

## 2022-01-14 DIAGNOSIS — G89.29 CHRONIC LEG PAIN: ICD-10-CM

## 2022-01-14 DIAGNOSIS — M54.42 CHRONIC BILATERAL LOW BACK PAIN WITH BILATERAL SCIATICA: ICD-10-CM

## 2022-01-14 NOTE — TELEPHONE ENCOUNTER
Please call the patient and inform her that she should also take her Robaxin 500 mg every 6 hours as needed  If she needs a refill, I can send it to her pharmacy

## 2022-01-14 NOTE — TELEPHONE ENCOUNTER
Patient is calling back, stating she having a lot of pain on her back,legs and knees   Transferred to [facility]

## 2022-01-14 NOTE — TELEPHONE ENCOUNTER
Maternal history of prolonged seizure activity at home, in ambulance, and in ER. Mom received multiple anti-seizure medications prior to delivery. No apnea or bradycardia in last 24 hours, last apnea with bradycardia on 4/21, self resolved. Remains hypotonic with intermittent sluggish movements on exam. Poor suck on pacifier. Findings c/w possible HIE  Plan: Monitor clinically. Provide supportive care.    Patient called in again inquiring about the medications  Advised her of what the doctor said  Patient understood

## 2022-01-17 ENCOUNTER — TELEPHONE (OUTPATIENT)
Dept: OBGYN CLINIC | Facility: HOSPITAL | Age: 57
End: 2022-01-17

## 2022-01-17 ENCOUNTER — TELEPHONE (OUTPATIENT)
Dept: OTHER | Facility: OTHER | Age: 57
End: 2022-01-17

## 2022-01-17 RX ORDER — METHOCARBAMOL 500 MG/1
500 TABLET, FILM COATED ORAL EVERY 6 HOURS PRN
Qty: 60 TABLET | Refills: 1 | Status: SHIPPED | OUTPATIENT
Start: 2022-01-17 | End: 2022-03-03 | Stop reason: SDUPTHER

## 2022-01-17 NOTE — TELEPHONE ENCOUNTER
Patient called again to move sx sooner  Estephanie from sx scheduling said there is nothing sooner and the patient has clearances she has to get through        # [phone]

## 2022-01-17 NOTE — TELEPHONE ENCOUNTER
Patient called in this morning to see if there were any cancellations regarding her TKA; wanted to know if she could move up her surgery date

## 2022-01-17 NOTE — TELEPHONE ENCOUNTER
Patient is calling to ask if there is any way to move her surgery up, as she is in severe pain  Patient states she will see any surgeon and at any location within 60 miles as long as the surgery can be done as soon as possible      Please advise     Samantha [phone]

## 2022-01-26 ENCOUNTER — TELEPHONE (OUTPATIENT)
Dept: INTERNAL MEDICINE CLINIC | Facility: CLINIC | Age: 57
End: 2022-01-26

## 2022-01-26 NOTE — TELEPHONE ENCOUNTER
[facility]    Name of [address] [place] [address] Physician's Order    Form to be filled out by-Dr Valente    Form to be Faxed [phone]    Patient made aware of 10 business day policy

## 2022-01-29 DIAGNOSIS — M17.0 PRIMARY OSTEOARTHRITIS OF BOTH KNEES: ICD-10-CM

## 2022-01-31 RX ORDER — FERROUS SULFATE TAB EC 324 MG (65 MG FE EQUIVALENT) 324 (65 FE) MG
324 TABLET DELAYED RESPONSE ORAL DAILY
Qty: 30 TABLET | Refills: 1 | Status: SHIPPED | OUTPATIENT
Start: 2022-01-31 | End: 2022-02-02

## 2022-02-02 DIAGNOSIS — M17.0 PRIMARY OSTEOARTHRITIS OF BOTH KNEES: ICD-10-CM

## 2022-02-02 RX ORDER — FERROUS SULFATE TAB EC 324 MG (65 MG FE EQUIVALENT) 324 (65 FE) MG
324 TABLET DELAYED RESPONSE ORAL DAILY
Qty: 90 TABLET | Refills: 1 | Status: SHIPPED | OUTPATIENT
Start: 2022-02-02 | End: 2022-06-28

## 2022-02-02 NOTE — ASSESSMENT & PLAN NOTE
Problem: Altered Mood, Depressive Behavior:  Goal: Able to verbalize and/or display a decrease in depressive symptoms  Description: Able to verbalize and/or display a decrease in depressive symptoms  Outcome: Ongoing     Problem: Altered Mood, Depressive Behavior:  Goal: Absence of self-harm  Description: Absence of self-harm  Outcome: Ongoing   Patient has been bright, social, and cooperative. Denies thoughts of self harm, remains free from self harm.  Support and encouragement provided ·  Patient on multiple different anti-anxiety  Medications  · Advised patient she needs to follow up with Psychiatry in regards to these medications  · Do feel there is polypharmacy with patient medications at this time

## 2022-02-03 ENCOUNTER — OFFICE VISIT (OUTPATIENT)
Dept: OBGYN CLINIC | Facility: CLINIC | Age: 57
End: 2022-02-03
Payer: MEDICARE

## 2022-02-03 VITALS
TEMPERATURE: 97.5 F | BODY MASS INDEX: 39.95 KG/M2 | DIASTOLIC BLOOD PRESSURE: 97 MMHG | WEIGHT: 203.5 LBS | HEIGHT: 60 IN | HEART RATE: 68 BPM | SYSTOLIC BLOOD PRESSURE: 135 MMHG

## 2022-02-03 DIAGNOSIS — M17.0 PRIMARY OSTEOARTHRITIS OF BOTH KNEES: Primary | ICD-10-CM

## 2022-02-03 PROCEDURE — 99213 OFFICE O/P EST LOW 20 MIN: CPT | Performed by: ORTHOPAEDIC SURGERY

## 2022-02-03 NOTE — PROGRESS NOTES
Assessment/Plan:  1  Primary osteoarthritis of both knees      Orders Placed This Encounter   Procedures    XR knee 4+ vw left injury    XR knee 4+ vw right injury       · Patient is scheduled for R TKA on 3/9/22  · Patient is too soon to received left knee CSI  She will return in 2 or 3 weeks for left knee CSI if pain persists  · Continue tylenol as needed for pain  · Encouraged patient to work on her range of motion  Return in about 3 weeks (around 2/24/2022) for left knee CSI  I answered all of the patient's questions during the visit and provided education of the patient's condition during the visit  The patient verbalized understanding of the information given and agrees with the plan  This note was dictated using Hypercontext software  It may contain errors including improperly dictated words  Please contact physician directly for any questions  Subjective   Chief Complaint:   Chief Complaint   Patient presents with    Left Knee - Follow-up    Right Knee - Follow-up       ADI Rodrigues Part is a 60 y o  female who presents for follow up for bilateral knee pain and severe OA  Patient is scheduled for R TKA on 3/9/21  Patient states she had a fall last week when she lost her balance and landed on her knees  She is having pain diffusely about bilateral knees  Pain is worse with weight bearing activities  She states her knees are unstable  She is taking tylenol and robaxin for pain and states neither help very much  Patient is using a rollator for assistance ambulating  Patient last received bilateral knee steroid injections on 11/26/21  Review of Systems  ROS:    See HPI for musculoskeletal review  All other systems reviewed are negative     History:  Past Medical History:   Diagnosis Date    Acid reflux     Anxiety     RESOLVED: [id number]    Arthritis     Bipolar 2 disorder (HCC)     FOLLOWS WITH PSYCHIATRIST   CONTINUE LAMOTRIGINE; RESOLVED: [id number]    Depression     Familial tremor     both hands    Fibromyalgia     LAST ASSESSED: [id number]    Hearing aid worn     left ear    [place] (hard of hearing)     left ear    Hypertension     Left-sided weakness     Lower back pain     Memory loss of unknown cause     long and short term    Migraine     Obesity     Obesity, Class II, BMI 35-39 9     Overactive bladder     Panic attack     Post traumatic stress disorder     Seasonal allergies     Stroke 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    CONT ASA 81 MG DAILY; RESOLVED: [id number]    Urinary incontinence     Wears dentures     partial lower / full upper    Wears glasses      Past Surgical History:   Procedure Laterality Date    BACK SURGERY       SECTION      COLONOSCOPY      RESOLVED: [id number]    EAR SURGERY      EGD      HYSTERECTOMY      MYRINGOTOMY W/ TUBES Left     NECK SURGERY  2019    UT CYSTOURETHROSCOPY N/A 2016    Procedure: CYSTOSCOPY, BOTOX INJECTION;  Surgeon: Kirstie Hernandez MD;  Location: [facility] Main [facility];  Service: Gynecology    UT IMPLANT SPINAL NEUROSTIM/ Right 2/10/2021    Procedure: REPLACEMENT IMPLANTABLE PULSE GENERATOR DORSAL SPINAL COLUMN STIMULATOR, RIGHT;  Surgeon: Chioma Wall MD;  Location:  MAIN OR;  Service: Neurosurgery    UT PERCUT IMPLNT Sharene Radon Right 2020    Procedure: INSERTION THORACIC DORSAL COLUMN SPINAL CORD STIMULATOR PERCUTANEOUS W IMPLANTABLE PULSE GENERATOR, RIGHT;  Surgeon: Chioma Wall MD;  Location:  MAIN OR;  Service: Neurosurgery    [address] [place] [address]    UPPER GASTROINTESTINAL ENDOSCOPY  2020     Social History   Social History     Substance and Sexual Activity   Alcohol Use Not Currently    Comment: 2 x year; being a social drinker as per all scripts      Social History     Substance and Sexual Activity   Drug Use No     Social History     Tobacco Use   Smoking Status Never Smoker Smokeless Tobacco Never Used     Family History:   Family History   Problem Relation Age of Onset    Colon cancer Mother     Alzheimer's disease Father     Stroke Father     Colon cancer Brother     Bipolar disorder Brother     Breast cancer Maternal Aunt     Colon cancer Maternal Aunt     Heart disease Other     Diabetes Other     Hypertension Other     Seizures Son     Depression Paternal Grandfather     No Known Problems Sister     No Known Problems Brother     Thyroid disease Neg Hx        Current Outpatient Medications on File Prior to Visit   Medication Sig Dispense Refill    amLODIPine (NORVASC) 5 mg tablet Take 1 tablet (5 mg total) by mouth daily 90 tablet 3    AneCream 4 % cream Please specify directions, refills and quantity 30 g 1    atorvastatin (LIPITOR) 40 mg tablet TAKE 1 TABLET BY MOUTH DAILY WITH DINNER 90 tablet 1    busPIRone (BUSPAR) 10 mg tablet       busPIRone (BUSPAR) 15 mg tablet Take 1 tablet (15 mg total) by mouth 3 (three) times a day 90 tablet 0    Calcium Ascorbate (VITAMIN C) 500 mg tablet Take 1 tablet (500 mg total) by mouth daily 30 tablet 1    [facility] Aspirin Adult Low Dose 81 MG chewable tablet CHEW 1 TABLET BY MOUTH EVERY DAY 90 tablet 3    [facility] Vitamin C 500 MG tablet TAKE 1 TABLET BY MOUTH TWICE A DAY (Patient not taking: Reported on 11/10/2021) 60 tablet 0    Diapers & Supplies (Huggies Pull-Ups) MISC Use 3 (three) times a day (Patient not taking: Reported on 11/10/2021 ) 100 each 3    Diclofenac Sodium (VOLTAREN) 1 % APPLY 2 GRAMS TO AFFECTED AREA 4 TIMES A  g 5    ferrous sulfate 324 (65 Fe) mg TAKE 1 TABLET (324 MG TOTAL) BY MOUTH 2 (TWO) TIMES A DAY BEFORE MEALS (Patient not taking: Reported on 11/10/2021 ) 60 tablet 1    ferrous sulfate 324 (65 Fe) mg TAKE 1 TABLET (324 MG TOTAL) BY MOUTH IN THE MORNING 90 tablet 1    folic acid (FOLVITE) 1 mg tablet TAKE 1 TABLET BY MOUTH EVERY DAY (Patient not taking: Reported on 11/10/2021) 30 tablet 1  folic acid (FOLVITE) 1 mg tablet Take 1 tablet (1 mg total) by mouth daily 30 tablet 1    hydrOXYzine HCL (ATARAX) 50 mg tablet Take 50 mg by mouth 3 (three) times a day as needed for itching      lithium carbonate (LITHOBID) 300 mg CR tablet Take 1 tablet (300 mg total) by mouth daily at bedtime 30 tablet 0    LORazepam (ATIVAN) 0 5 mg tablet Take 1 tablet (0 5 mg total) by mouth 2 (two) times a day as needed for anxiety  0    methocarbamol (ROBAXIN) 500 mg tablet Take 1 tablet (500 mg total) by mouth every 6 (six) hours as needed for muscle spasms 60 tablet 1    mirtazapine (REMERON) 15 mg tablet       mirtazapine (REMERON) 7 5 MG tablet Take 1 tablet (7 5 mg total) by mouth daily at bedtime as needed (insomnia) 30 tablet 0    Multiple Vitamin (multivitamin) tablet Take 1 tablet by mouth daily 30 tablet 1    naloxone (NARCAN) 4 mg/0 1 mL nasal spray Administer 1 spray into a nostril  If no response after 2-3 minutes, give another dose in the other nostril using a new spray   (Patient not taking: Reported on 12/9/2021 ) 1 each 1    omeprazole (PriLOSEC) 20 mg delayed release capsule  (Patient not taking: Reported on 12/9/2021 )      oxyCODONE-acetaminophen (Percocet) 5-325 mg per tablet Take 1 tablet by mouth every other day Max Daily Amount: 1 tablet 4 tablet 0    pantoprazole (PROTONIX) 20 mg tablet TAKE 1 TABLET (20 MG TOTAL) BY MOUTH DAILY IN THE EARLY MORNING (Patient not taking: Reported on 11/10/2021 ) 30 tablet 0    PARoxetine (PAXIL) 30 mg tablet Take 2 tablets (60 mg total) by mouth daily 60 tablet 0    potassium chloride (K-DUR,KLOR-CON) 20 mEq tablet Take 1 tablet (20 mEq total) by mouth daily 90 tablet 1    prazosin (MINIPRESS) 2 mg capsule TAKE 1 CAPSULE BY MOUTH EVERY DAY 30 capsule 1    pregabalin (LYRICA) 150 mg capsule Take 1 capsule (150 mg total) by mouth 3 (three) times a day 120 capsule 1    propranolol (INDERAL) 20 mg tablet TAKE 1 TABLET (20 MG TOTAL) BY MOUTH 2 (TWO) TIMES A DAY BEFORE BREAKFAST AND LUNCH 90 tablet 3    QUEtiapine (SEROquel) 25 mg tablet Take 2 tablets (50 mg total) by mouth 4 (four) times daily (after meals and at bedtime) 120 tablet 0    Respiratory Therapy Supplies (Nebulizer) YUDY Use as needed (Shortness of breath) (Patient not taking: Reported on 4/20/2021) 1 each 0    senna-docusate sodium (SENOKOT S) 8 6-50 mg per tablet Take 1 tablet by mouth daily as needed for constipation 30 tablet 0    Valbenazine Tosylate (Ingrezza) 80 MG CAPS Take 80 mg by mouth daily 90 capsule 0     No current facility-administered medications on file prior to visit       No Known Allergies     Objective     /97   Pulse 68   Temp 97 5 °F (36 4 °C)   Ht 5' (1 524 m)   Wt 92 3 kg (203 lb 8 oz)   BMI 39 74 kg/m²      PE:  AAOx 3  WDWN  Hearing intact, no drainage from eyes  no audible wheezing  no abdominal distension  LE compartments soft, skin intact    Ortho Exam:  bilateral Knee:   No erythema  + mild swelling  no effusion  no warmth  +TTP over medial and lateral joint lines  AROM: right 5- 115, left 10- 115  Stable to varus/valgus stress    Imaging Studies: I have personally reviewed pertinent films in PACS  X-rays bilateral knee: no acute osseous deformity, severe osteoarthritis

## 2022-02-09 ENCOUNTER — TELEPHONE (OUTPATIENT)
Dept: INTERNAL MEDICINE CLINIC | Facility: CLINIC | Age: 57
End: 2022-02-09

## 2022-02-09 ENCOUNTER — TELEPHONE (OUTPATIENT)
Dept: OBGYN CLINIC | Facility: HOSPITAL | Age: 57
End: 2022-02-09

## 2022-02-09 NOTE — TELEPHONE ENCOUNTER
Patient was given diapers last month  Patient would like to reorder her pull up diapers   She is not sure where she would get them or how she can reorder

## 2022-02-15 NOTE — TELEPHONE ENCOUNTER
Spoke to patient and number given for her to call regarding her pull ups  Patient is very confused and keeps saying she already spoke to them  I explained to her that we just spoke to them a few minutes ago and they said they are waiting for her to call   I explained this to her several times, she said she will call

## 2022-02-15 NOTE — TELEPHONE ENCOUNTER
Called and spoke to Arlene from home care delivered a medical supply company at [phone]  Per edda she has everything she needs from us but they have attempted to contact the patient 3 times for her to sign off on papers  Please have patient reach out to them at number listed above

## 2022-02-17 ENCOUNTER — PATIENT OUTREACH (OUTPATIENT)
Dept: INTERNAL MEDICINE CLINIC | Facility: CLINIC | Age: 57
End: 2022-02-17

## 2022-02-17 NOTE — PROGRESS NOTES
HIRAM and Emu=angy RN CM returned call to pt who called to ask for help wit paperwork? Pt had also called Clerical staff and they did review with pt all of her upcoming appointments  Analia Coello RN/HUNTER also did review with her her upcoming appointment for her knee surgery  CM has asked pt what her discharge plan after surgery  will be and pt is anticipating a home discharge  She relates she is presently is able to do the steps to get in and out of her home  CM did encourage her to speak to the Rehab Team to make sure they know of the steps and to consider short term rehab if they recommend it  Pt indicted preference to return home  Sw did inquire if her son in law/ caregiver can also review and assist pt with keeping track of all these appointments  She relates he is presently at the store  Sw has asked if she is following up with her [facility] and she relate she is   Sw also asked if she has an ICM now and she  did not seem to understand the question  She did say people call her  SW offered to reach out to her  re same and pt agreeable  Hiram has reached out to [employer] her [address] with [facility] to ask for an update on her ICM referral  But had to leave a message  Pt denies any other needs at present

## 2022-02-17 NOTE — PROGRESS NOTES
Outpatient Care Management Note:    Montserrat and I called and spoke with patient  Patient reports she is doing ok at this time  Patient scheduled for upcoming right total knee replacement on 3/9/22  Patient aware of pre-op clearance with PCP office on 2/28 @ 3 pm  Encouraged for patient to bring caregiver (son in law) or family member with her to appointments  Patient reports her plan is to coming home after her surgery  Patient has steps that she needs to do in the home  Patient encouraged to talk with family and ensure she has enough support for when she comes home  Patient declining need for short term rehab after her surgery  Patient aware of phone interview with pre-admission testing on 2/22 at 8:15 am  Patient aware of appointment with ortho office on 3/3 @ 8:45 am      Patient reports she is following up with her mental health provider routinely  Patient does not know if she is connected with an ICM worker yet and  will further follow up on this  Patient does not have any further questions, concerns, or other needs at this time  Pt has my contact # and PCP office # if needed  Pt is agreeable for further outreach

## 2022-02-23 ENCOUNTER — TELEPHONE (OUTPATIENT)
Dept: INTERNAL MEDICINE CLINIC | Facility: CLINIC | Age: 57
End: 2022-02-23

## 2022-02-23 NOTE — TELEPHONE ENCOUNTER
Patient states the muscle relaxer Methocarbamol is not working at all  She still has the pain in her back,legs and her knees  She wants to know if something else can be called into the pharmacy? Please let her know what the doctor says

## 2022-02-25 NOTE — TELEPHONE ENCOUNTER
Advise patient that when she comes in for her appt on 2/28, the doctor will have a discussion with her about pain medication, but in the mean time to continue to use the Voltaren and tylenol as needed  Patient understood and didn't have any further questions

## 2022-02-27 DIAGNOSIS — M17.0 PRIMARY OSTEOARTHRITIS OF BOTH KNEES: ICD-10-CM

## 2022-02-28 ENCOUNTER — CONSULT (OUTPATIENT)
Dept: INTERNAL MEDICINE CLINIC | Facility: CLINIC | Age: 57
End: 2022-02-28

## 2022-02-28 ENCOUNTER — PATIENT OUTREACH (OUTPATIENT)
Dept: INTERNAL MEDICINE CLINIC | Facility: CLINIC | Age: 57
End: 2022-02-28

## 2022-02-28 ENCOUNTER — TELEPHONE (OUTPATIENT)
Dept: OTHER | Facility: OTHER | Age: 57
End: 2022-02-28

## 2022-02-28 VITALS
BODY MASS INDEX: 37.18 KG/M2 | DIASTOLIC BLOOD PRESSURE: 104 MMHG | HEART RATE: 76 BPM | WEIGHT: 190.4 LBS | SYSTOLIC BLOOD PRESSURE: 154 MMHG

## 2022-02-28 DIAGNOSIS — Z01.818 PREOPERATIVE EVALUATION TO RULE OUT SURGICAL CONTRAINDICATION: Primary | ICD-10-CM

## 2022-02-28 DIAGNOSIS — N39.41 URGE INCONTINENCE OF URINE: ICD-10-CM

## 2022-02-28 DIAGNOSIS — Z00.8 MEDICAL CLEARANCE FOR PSYCHIATRIC ADMISSION: ICD-10-CM

## 2022-02-28 DIAGNOSIS — M17.11 PRIMARY OSTEOARTHRITIS OF RIGHT KNEE: ICD-10-CM

## 2022-02-28 DIAGNOSIS — Z79.899 LITHIUM LEVEL CHECKED EVERY SIX MONTHS: ICD-10-CM

## 2022-02-28 DIAGNOSIS — G89.4 CHRONIC PAIN DISORDER: ICD-10-CM

## 2022-02-28 DIAGNOSIS — I10 HYPERTENSION, UNSPECIFIED TYPE: ICD-10-CM

## 2022-02-28 PROCEDURE — 3080F DIAST BP >= 90 MM HG: CPT | Performed by: INTERNAL MEDICINE

## 2022-02-28 PROCEDURE — 99214 OFFICE O/P EST MOD 30 MIN: CPT | Performed by: INTERNAL MEDICINE

## 2022-02-28 PROCEDURE — 3077F SYST BP >= 140 MM HG: CPT | Performed by: INTERNAL MEDICINE

## 2022-02-28 RX ORDER — MULTIVITAMIN WITH FOLIC ACID 400 MCG
TABLET ORAL
Qty: 30 TABLET | Refills: 1 | Status: SHIPPED | OUTPATIENT
Start: 2022-02-28 | End: 2022-03-17 | Stop reason: HOSPADM

## 2022-02-28 RX ORDER — PREGABALIN 150 MG/1
150 CAPSULE ORAL 3 TIMES DAILY
Qty: 120 CAPSULE | Refills: 1
Start: 2022-02-28 | End: 2022-03-03 | Stop reason: SDUPTHER

## 2022-02-28 RX ORDER — MIRTAZAPINE 7.5 MG/1
7.5 TABLET, FILM COATED ORAL
Qty: 30 TABLET | Refills: 0 | Status: SHIPPED | OUTPATIENT
Start: 2022-02-28 | End: 2022-03-17 | Stop reason: HOSPADM

## 2022-02-28 RX ORDER — ACETAMINOPHEN 500 MG
500 TABLET ORAL EVERY 6 HOURS PRN
Qty: 120 TABLET | Refills: 0 | Status: SHIPPED | OUTPATIENT
Start: 2022-02-28 | End: 2022-03-08 | Stop reason: SDUPTHER

## 2022-02-28 RX ORDER — PROPRANOLOL HYDROCHLORIDE 20 MG/1
20 TABLET ORAL
Qty: 90 TABLET | Refills: 3 | Status: SHIPPED | OUTPATIENT
Start: 2022-02-28 | End: 2022-03-17 | Stop reason: HOSPADM

## 2022-02-28 NOTE — PROGRESS NOTES
[facility] Visit Note  Samantha Brunner 60 y o  female   MRN: [id number]    Assessment and Plan      1  Preoperative evaluation to rule out surgical contraindication  Assessment & Plan:  Patient to undergo right total knee arthroplasty on 03/09/2022  RCRI is 1  Patient is able to complete >4 METS  Patient has no history of ischemic heart disease  Cardiac studies including previous TTE and EKGs have been reviewed  Patient will have routine labs including CMP/CBC completed  Patient is otherwise medically optimized and I believe she should undergo this procedure  2  Urge incontinence of urine  Assessment & Plan:  - history of urinary incontinence  - continue diapers at patient request    Orders:  -     Diapers & Supplies (Huggies Pull-Ups) MISC; Use 3 (three) times a day    3  Primary osteoarthritis of right knee  Assessment & Plan:  - pain regimen:  Tylenol, Lyrica, Voltaren gel  - right TKA planned for 03/09/2022    Orders:  -     acetaminophen (TYLENOL) 500 mg tablet; Take 1 tablet (500 mg total) by mouth every 6 (six) hours as needed for mild pain    4  Medical clearance for psychiatric admission  Assessment & Plan:  - history of anxiety on numerous psychotropic medications  - recommend behavioral health follow-up    Orders:  -     mirtazapine (REMERON) 7 5 MG tablet; Take 1 tablet (7 5 mg total) by mouth daily at bedtime as needed (insomnia)  -     propranolol (INDERAL) 20 mg tablet; Take 1 tablet (20 mg total) by mouth 2 (two) times a day before breakfast and lunch    5  Hypertension, unspecified type    6  Chronic pain disorder  Assessment & Plan:  - history of bilateral knee osteoarthritis and back pain  - previously with opioid dependence, now off  - continue pain regimen as noted above    Orders:  -     pregabalin (LYRICA) 150 mg capsule; Take 1 capsule (150 mg total) by mouth 3 (three) times a day    7   Lithium level checked every six months  - Lithium level; Future               Subjective   HISTORY OF PRESENT ILLNESS:  Melody D Ander Osgood is a 60 y o  female with a past medical history of severe bilateral knee osteoarthritis, chronic pain syndrome, opioid dependence, tardive dyskinesia, hypertension, hyperlipidemia, and prior CVA who presents to clinic today for preoperative evaluation  Son in law usually manages patient's medications, who is not here today  Patient is unable to verify her medications  I have instructed patient to call back  Surgery to undergo: Right total knee arthroplasty   Date: 3/9/22  Risk of procedure: High   · No history of ischemic heart disease  · Not on insulin   · Prior cardiac studies:   · 7/1/21 TTE: EF 61%, no WMA, no valvular pathology   · 12/6/21 EKG: NSR w/ prolonged QTc 490  · No catheterization/stress tests    RCRI: 1    Risk Factor Score (Yes=1, No=0)   Hx of TIA/CVA 1   Hx of prior ischemic heart disease (AMI, unstable angina, Q waves on EKG, CABG) 0   Hx of congestive heart failure 0   Serum Creatinine >2 mg/dl 0   Insulin dependent diabetes mellitus 0   Total Score 1     Risk of MACE (30-day)     Points Risk % (95% CI), Rajinder 2017 Risk % (95% CI) Pancho, 1999   0 3 9 (2 8-5 4) 0 4 (0 05-1 5)   1 6 (4 9-7 4) 0 9 (0 3-2 1)   2 10 1 (8 1-12 6) 6 6 (3 9-10 3)   3 or more 15 (11 1-20) >11 (5 8-18  4)       Review of Systems   Constitutional: Negative for chills, fatigue, fever and unexpected weight change  HENT: Negative for congestion, rhinorrhea, sinus pain and sore throat  Eyes: Negative for visual disturbance  Respiratory: Negative for cough and shortness of breath  Cardiovascular: Negative for chest pain, palpitations and leg swelling  Gastrointestinal: Negative for abdominal pain, blood in stool, constipation, diarrhea, nausea and vomiting  Endocrine: Negative for cold intolerance, heat intolerance, polydipsia and polyuria     Genitourinary: Negative for difficulty urinating, dysuria, frequency, hematuria and urgency  Musculoskeletal: Positive for arthralgias, back pain, gait problem and myalgias  Skin: Negative for rash and wound  Neurological: Negative for dizziness, syncope, weakness, light-headedness and headaches  Psychiatric/Behavioral: Negative for behavioral problems, confusion and sleep disturbance  Objective     Vitals:    02/28/22 1508   BP: (!) 154/104   BP Location: Right arm   Patient Position: Sitting   Cuff Size: Large   Pulse: 76   Weight: 86 4 kg (190 lb 6 4 oz)     Physical Exam  Vitals reviewed  Constitutional:       General: She is not in acute distress  Appearance: Normal appearance  She is obese  Comments: Awake and conversant  HENT:      Head: Normocephalic and atraumatic  Ears:      Comments: Hearing grossly intact  Eyes:      Comments: Normal conjunctiva, anicteric  Round symmetric pupils  Neck:      Thyroid: No thyroid mass or thyromegaly  Cardiovascular:      Rate and Rhythm: Normal rate and regular rhythm  Pulses: Normal pulses  Heart sounds: Normal heart sounds  Pulmonary:      Effort: Pulmonary effort is normal  No respiratory distress  Breath sounds: Normal breath sounds  No wheezing or rhonchi  Abdominal:      General: Abdomen is flat  Bowel sounds are normal  There is no distension  Palpations: Abdomen is soft  Tenderness: There is no abdominal tenderness  Musculoskeletal:      Cervical back: Neck supple  Comments: Ambulates with walker, antalgic gait  No clubbing or cyanosis  Skin:     General: Skin is warm and dry  Findings: No rash (or ulcers)  Neurological:      General: No focal deficit present  Mental Status: She is alert and oriented to person, place, and time  Comments: Tardive dyskinesia present  Psychiatric:         Mood and Affect: Mood normal          Behavior: Behavior normal          Thought Content:  Thought content normal          History     Current Outpatient Medications:     amLODIPine (NORVASC) 5 mg tablet, Take 1 tablet (5 mg total) by mouth daily, Disp: 90 tablet, Rfl: 3    AneCream 4 % cream, Please specify directions, refills and quantity, Disp: 30 g, Rfl: 1    atorvastatin (LIPITOR) 40 mg tablet, TAKE 1 TABLET BY MOUTH DAILY WITH DINNER, Disp: 90 tablet, Rfl: 1    busPIRone (BUSPAR) 10 mg tablet, , Disp: , Rfl:     busPIRone (BUSPAR) 15 mg tablet, Take 1 tablet (15 mg total) by mouth 3 (three) times a day, Disp: 90 tablet, Rfl: 0    Calcium Ascorbate (VITAMIN C) 500 mg tablet, Take 1 tablet (500 mg total) by mouth daily, Disp: 30 tablet, Rfl: 1    CVS Aspirin Adult Low Dose 81 MG chewable tablet, CHEW 1 TABLET BY MOUTH EVERY DAY, Disp: 90 tablet, Rfl: 3    Diclofenac Sodium (VOLTAREN) 1 %, APPLY 2 GRAMS TO AFFECTED AREA 4 TIMES A DAY, Disp: 200 g, Rfl: 5    ferrous sulfate 324 (65 Fe) mg, TAKE 1 TABLET (324 MG TOTAL) BY MOUTH IN THE MORNING, Disp: 90 tablet, Rfl: 1    folic acid (FOLVITE) 1 mg tablet, Take 1 tablet (1 mg total) by mouth daily, Disp: 30 tablet, Rfl: 1    hydrOXYzine HCL (ATARAX) 50 mg tablet, Take 50 mg by mouth 3 (three) times a day as needed for itching, Disp: , Rfl:     lithium carbonate (LITHOBID) 300 mg CR tablet, Take 1 tablet (300 mg total) by mouth daily at bedtime, Disp: 30 tablet, Rfl: 0    LORazepam (ATIVAN) 0 5 mg tablet, Take 1 tablet (0 5 mg total) by mouth 2 (two) times a day as needed for anxiety, Disp: , Rfl: 0    methocarbamol (ROBAXIN) 500 mg tablet, Take 1 tablet (500 mg total) by mouth every 6 (six) hours as needed for muscle spasms, Disp: 60 tablet, Rfl: 1    mirtazapine (REMERON) 7 5 MG tablet, Take 1 tablet (7 5 mg total) by mouth daily at bedtime as needed (insomnia), Disp: 30 tablet, Rfl: 0    Multiple Vitamin (Daily-Annette Multivitamin) TABS, TAKE 1 TABLET BY MOUTH EVERY DAY, Disp: 30 tablet, Rfl: 1    pantoprazole (PROTONIX) 20 mg tablet, Take 1 tablet (20 mg total) by mouth daily in the early morning, Disp: 30 tablet, Rfl: 0    PARoxetine (PAXIL) 30 mg tablet, Take 2 tablets (60 mg total) by mouth daily, Disp: 60 tablet, Rfl: 0    potassium chloride (K-DUR,KLOR-CON) 20 mEq tablet, Take 1 tablet (20 mEq total) by mouth daily, Disp: 90 tablet, Rfl: 1    prazosin (MINIPRESS) 2 mg capsule, TAKE 1 CAPSULE BY MOUTH EVERY DAY, Disp: 30 capsule, Rfl: 1    pregabalin (LYRICA) 150 mg capsule, Take 1 capsule (150 mg total) by mouth 3 (three) times a day, Disp: 120 capsule, Rfl: 1    propranolol (INDERAL) 20 mg tablet, Take 1 tablet (20 mg total) by mouth 2 (two) times a day before breakfast and lunch, Disp: 90 tablet, Rfl: 3    QUEtiapine (SEROquel) 25 mg tablet, Take 2 tablets (50 mg total) by mouth 4 (four) times daily (after meals and at bedtime), Disp: 120 tablet, Rfl: 0    Valbenazine Tosylate (Ingrezza) 80 MG CAPS, Take 80 mg by mouth daily, Disp: 90 capsule, Rfl: 0    acetaminophen (TYLENOL) 500 mg tablet, Take 1 tablet (500 mg total) by mouth every 6 (six) hours as needed for mild pain, Disp: 120 tablet, Rfl: 0    Diapers & Supplies (Huggies Pull-Ups) MISC, Use 3 (three) times a day, Disp: 100 each, Rfl: 3  No Known Allergies   Past Medical History:   Diagnosis Date    Acid reflux     Anxiety     RESOLVED: [id number]    Arthritis     Bipolar 2 disorder (HCC)     FOLLOWS WITH PSYCHIATRIST   CONTINUE LAMOTRIGINE; RESOLVED: [id number]    Depression     Familial tremor     both hands    Fibromyalgia     LAST ASSESSED: [id number]    GERD (gastroesophageal reflux disease)     Hearing aid worn     left ear    [place] (hard of hearing)     left ear    Hyperlipidemia     Hypertension     Left-sided weakness     Lower back pain     Memory loss of unknown cause     long and short term    Migraine     Obesity     Obesity, Class II, BMI 35-39 9     Overactive bladder     Panic attack     Post traumatic stress disorder     Seasonal allergies     Stroke [facility])     questionable stroke 2009    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    CONT ASA 81 MG DAILY; RESOLVED: [id number]    Urinary incontinence     Wears dentures     partial lower / full upper    Wears glasses      Past Surgical History:   Procedure Laterality Date    BACK SURGERY       SECTION      COLONOSCOPY      RESOLVED: [id number]    EAR SURGERY      EGD      HYSTERECTOMY      MYRINGOTOMY W/ TUBES Left     NECK SURGERY  2019    NM CYSTOURETHROSCOPY N/A 2016    Procedure: CYSTOSCOPY, BOTOX INJECTION;  Surgeon: Alma Sommers MD;  Location: [facility];  Service: Gynecology    NM IMPLANT SPINAL NEUROSTIM/ Right 2/10/2021    Procedure: REPLACEMENT IMPLANTABLE PULSE GENERATOR DORSAL SPINAL COLUMN STIMULATOR, RIGHT;  Surgeon: Tunde Escalona MD;  Location:  MAIN OR;  Service: Neurosurgery    NM PERCUT IMPLNT [address] Right 2020    Procedure: INSERTION THORACIC DORSAL COLUMN SPINAL CORD STIMULATOR PERCUTANEOUS W IMPLANTABLE PULSE GENERATOR, RIGHT;  Surgeon: Tunde Escalona MD;  Location:  MAIN OR;  Service: Neurosurgery    [address] [place] [address]    UPPER GASTROINTESTINAL ENDOSCOPY  2020     Social History     Socioeconomic History    Marital status:      Spouse name: Not on file    Number of children: 2    Years of education: graduate school     Highest education level: Not on file   Occupational History    Occupation: Disabled   Tobacco Use    Smoking status: Never Smoker    Smokeless tobacco: Never Used   Vaping Use    Vaping Use: Never used   Substance and Sexual Activity    Alcohol use: Not Currently     Comment: 2 x year; being a social drinker as per all scripts     Drug use: No    Sexual activity: Not Currently   Other Topics Concern    Not on file   Social History Narrative    Bereavement    Daily caffeine consumption, 6-8 servings per day     as per all scripts    Lives in [address] [place] [address] Determinants of Health     Financial Resource Strain: Low Risk     Difficulty of Paying Living Expenses: Not hard at all   Food Insecurity: No Food Insecurity    Worried About Running Out of Food in the Last Year: Never true    Daniel of Food in the Last Year: Never true   Transportation Needs: No Transportation Needs    Lack of Transportation (Medical): No    Lack of Transportation (Non-Medical): No   Physical Activity: Inactive    Days of Exercise per Week: 0 days    Minutes of Exercise per Session: 0 min   Stress: Not on file   Social Connections: Moderately Isolated    Frequency of Communication with Friends and Family: More than three times a week    Frequency of Social Gatherings with Friends and Family: More than three times a week    Attends Mu-ism Services: More than 4 times per year    Active Member of [employer] or Organizations: No    Attends Club or Organization Meetings: Never    Marital Status:    Intimate Partner Violence: Not At Risk    Fear of Current or Ex-Partner: No    Emotionally Abused: No    Physically Abused: No    Sexually Abused: No   Housing Stability: [address] Unable to Pay for Housing in the Last Year: No    Number of [place] in the Last Year: 1    Unstable Housing in the Last Year: No     Family History   Problem Relation Age of Onset    Colon cancer Mother     Alzheimer's disease Father     Stroke Father     Colon cancer Brother     Bipolar disorder Brother     Breast cancer Maternal Aunt     Colon cancer Maternal Aunt     Heart disease Other     Diabetes Other     Hypertension Other     Seizures Son     Depression Paternal Grandfather     No Known Problems Sister     No Known Problems Brother     Thyroid disease Neg Hx          DO Marilou Hernández 73 Internal Medicine PGY-1  [address] [facility]  [address] [facility] [address] [facility]    PLEASE NOTE:  This encounter was completed utilizing the M-Modal/Fluency Direct Speech Voice Recognition Software  Grammatical errors, random word insertions, pronoun errors and incomplete sentences are occasional consequences of the system due to software limitations, ambient noise and hardware issues  These may be missed by proof reading prior to affixing electronic signature  Any questions or concerns about the content, text or information contained within the body of this dictation should be directly addressed to the physician for clarification  Please do not hesitate to call me directly if you have any any questions or concerns

## 2022-02-28 NOTE — ASSESSMENT & PLAN NOTE
- history of bilateral knee osteoarthritis and back pain  - previously with opioid dependence, now off  - continue pain regimen as noted above

## 2022-02-28 NOTE — TELEPHONE ENCOUNTER
Pt called in stating she will try to finish paperwork by 10am on 3/1/2022, but if not, she will have it done as soon as possible

## 2022-02-28 NOTE — TELEPHONE ENCOUNTER
Thank you Phoebe  She had been instructed to go on either 2/22 or 2/23 to any [facility]'s lab/facility to have her labs drawn in preparation for her medical clearance tomorrow  This had been written down in her surgery packet and again on the labs slips that I mailed to her  I'm glad that you were able to speak with her about it

## 2022-02-28 NOTE — ASSESSMENT & PLAN NOTE
Patient to undergo right total knee arthroplasty on 03/09/2022  RCRI is 1  Patient is able to complete >4 METS  Patient has no history of ischemic heart disease  Cardiac studies including previous TTE and EKGs have been reviewed  Patient will have routine labs including CMP/CBC completed  Patient is otherwise medically optimized and I believe she should undergo this procedure

## 2022-02-28 NOTE — PRE-PROCEDURE INSTRUCTIONS
Pre-Surgery Instructions:   Medication Instructions    amLODIPine (NORVASC) 5 mg tablet Take as directed    busPIRone (BUSPAR) 10 mg tablet Take as directed    Calcium Ascorbate (VITAMIN C) 500 mg tablet Take as instructed by surgeon    CVS Aspirin Adult Low Dose 81 MG chewable tablet Pt will get instructions from PCP on 2/28    Diclofenac Sodium (VOLTAREN) 1 % Do not use day of surgery    ferrous sulfate 324 (65 Fe) mg Take as directed by surgeon   Ana Felix folic acid (FOLVITE) 1 mg tablet Take as directed by surgeon    lithium carbonate (LITHOBID) 300 mg CR tablet Take as directed    LORazepam (ATIVAN) 0 5 mg tablet Take as directed    methocarbamol (ROBAXIN) 500 mg tablet Take as directed    mirtazapine (REMERON) 15 mg tablet Take as directed    Multiple Vitamin (Daily-Annette Multivitamin) TABS Take as instructed by surgeon    pantoprazole (PROTONIX) 20 mg tablet Take as directed    PARoxetine (PAXIL) 30 mg tablet Take as directed    potassium chloride (K-DUR,KLOR-CON) 20 mEq tablet Take as directed    prazosin (MINIPRESS) 2 mg capsule Take as directed    propranolol (INDERAL) 20 mg tablet Take as directed    QUEtiapine (SEROquel) 25 mg tablet Take as directed    Valbenazine Tosylate (Ingrezza) 80 MG CAPS Take as directed      My Surgical Experience    The following information was developed to assist you to prepare for your operation  What do I need to do before coming to the hospital?   Arrange for a responsible person to drive you to and from the hospital    Arrange care for your children at home  Children are not allowed in the recovery areas of the hospital   Plan to wear clothing that is easy to put on and take off  If you are having shoulder surgery, wear a shirt that buttons or zippers in the front  Bathing  o Shower the evening before and the morning of your surgery with an antibacterial soap   Please refer to the Pre Op Showering Instructions for Surgery Patients Sheet   o Remove nail polish and all body piercing jewelry  o Do not shave any body part for at least 24 hours before surgery-this includes face, arms, legs and upper body  Food  o Nothing to eat or drink after midnight the night before your surgery  This includes candy and chewing gum  o Exception: If your surgery is after 12:00pm (noon), you may have clear liquids such as 7-Up®, ginger ale, apple or cranberry juice, Jell-O®, water, or clear broth until 8:00 am  o Do not drink milk or juice with pulp on the morning before surgery  o Do not drink alcohol 24 hours before surgery  Medicine  o Follow instructions you received from your surgeon about which medicines you may take on the day of surgery  o If instructed to take medicine on the morning of surgery, take pills with just a small sip of water  Call your prescribing doctor for specific infroamtion on what to do if you take insulin    What should I bring to the hospital?    Bring:  Michael Nims or a walker, if you have them, for foot or knee surgery   A list of the daily medicines, vitamins, minerals, herbals and nutritional supplements you take  Include the dosages of medicines and the time you take them each day   Glasses, dentures or hearing aids   Minimal clothing; you will be wearing hospital sleepwear   Photo ID; required to verify your identity   If you have a Living Will or Power of , bring a copy of the documents   If you have an ostomy, bring an extra pouch and any supplies you use    Do not bring   Medicines or inhalers   Money, valuables or jewelry    What other information should I know about the day of surgery?  Notify your surgeons if you develop a cold, sore throat, cough, fever, rash or any other illness     Report to the Ambulatory Surgical/Same Day Surgery Unit   You will be instructed to stop at Registration only if you have not been pre-registered   Inform your  fi they do not stay that they will be asked by the staff to leave a phone number where they can be reached   Be available to be reached before surgery  In the event the operating room schedule changes, you may be asked to come in earlier or later than expected    *It is important to tell your doctor and others involved in your health care if you are taking or have been taking any non-prescription drugs, vitamins, minerals, herbals or other nutritional supplements   Any of these may interact with some food or medicines and cause a reaction

## 2022-02-28 NOTE — PROGRESS NOTES
Outpatient Care Management Note:    Patient is at PCP office today for pre-op clearance for right knee replacement on 3/9/22  Khai Yañez,  and I met with patient before her appointment  Patient presents to the office by herself and reports used transportation through her insurance to get to appointment  Patient using rollator to ambulate  Patient reports she is having increased anxiety and reports took prescribed Ativan today at the office to help her  She reports she also has hydroxyzine to help her too  Patient reports her son in law is her paid caregiver and helps her 7 days a week from 6 am-5 am  Patient lives with her daughter and son in law  Patient reports her daughter is available to assist her as needed  Patient reports she had a fall about 2 weeks ago at home in the living room while walking and reports "my leg just gave out " Patient reported knee pain afterwards and denies striking her head  Patient reports she went to the emergency room  Per chart review was seen on 2/1/22 at [address] [place] [address] ED  Reviewed upcoming appointments with patient including ortho on 3/3, PT on 3/7, patient reports in person mental health appointment on 3/8, and surgery planned for 3/9  Patient reports she would be open to [facility] services with mental health after her surgery   will follow up on this  Patient reports gum pain today  Patient reports she does not have any teeth and has dentures but does not use them and need to be refitted  Patient encouraged to make the doctor who is seeing her aware of this  Patient reports she is managing ok with her medication  She reports family picks up her medication for her  She reports she is planning on getting a pill box to set up her meds  I encouraged this  Patient does not have any further questions, concerns, or other needs at this time  Pt has my contact # and PCP office # if needed  Pt is agreeable for further outreach

## 2022-02-28 NOTE — PATIENT INSTRUCTIONS
- Please call our office back to confirm your medications- name, dose, and frequency  - Please schedule a follow-up appointment with psychiatry/behavioral health to confirm which psychiatric medications you should be taking  - Please complete the lab work that is ordered to test your electrolytes, lithium level, kidney function, and blood count  - For pain: Start taking Tylenol 500 mg every 6 hours, voltaren gel daily, and Lyrica 150 mg three times a day

## 2022-02-28 NOTE — TELEPHONE ENCOUNTER
Preoperative Elective Admission Assessment- spoke to patient     [address]     Living Situation:    Who does pt live with: relative- daughter and her    What kind of home: multi-level  How do they enter the home: front  How many levels in home: 2 levels    # of steps to enter home: 8 to enter with railing   # of steps to second floor: N/A - stays first floor   Are there handrails: Yes  Sleeping arrangement: first/entry floor  Where is Bathroom: First floor, step in tub, with bench        First Floor Setup:   Is there a bathroom: Yes  Where would pt sleep: bed     DME: frame walker     Patient's Current Level of Function: Ambulates with walker and ADLs: Requires assistance    Post-op Caregiver: Son in [place]  She reports "he is my helper" through the waiver program   Caregiver Name and phone number for Inpatient discharge needs: Son in law, does not have phone number  Currently receive any HHC/aides/community supports: Yes- through waiver program gets support, her son in law is the caregiver      Post-op Transport: Mostly Uber per patient, until son in [address] [facility] car is fixed by surgery, then son in law will drive  To/from hospital: child  To/from PT 2-3x/week: child  Uses community transport now: No     Outpatient Physical Therapy Site:  Site: [facility]   pre and post-op appts scheduled? Yes     Medication Management: self, with assistance and pillbox, patient reports she does, but showed her son in law how to do it also  [facility] Pharm[facility]   Blood Management Vitamin Regimen: Pt confirms taking as prescribed  Post-op anticoagulant: to be determined by surgical team postoperatively     DC Plan: Pt plans to be discharged home  Pt educated that our goal, if at all possible, is to appropriately discharge patient based off their post-op function while striving to maintain maximal independence   If possible, the goal is to discharge patient to home and for them to attend outpatient physical therapy  Barriers to DC identified preoperatively: Geni  BMI: 39 74     Cares[employer]: declined enrollment    Patient Education:  Pt educated on post-op pain, early mobilization (POD0), Average inpt LOS, OP PT goal   Patient educated that our goal is to appropriately discharge patient based off their post-op function while striving to maintain maximal independence  The goal is to discharge patient to home and for them to attend outpatient physical therapy

## 2022-03-01 NOTE — PROGRESS NOTES
HIRAM and Alex Castrejon RN/CM have met with pt in preparation for her upcoming  orthopedic surgery  Pt did come to her appointment alone  Pt was c/o anxiety but took medication she felt was helping  Pt relates she is attending her Op [address] appointments at  [address]  HIRAM asked for an update on her ICM referral and she relates she does not want to pursue it until after surgery  She did give permission for SW to f/u with her  there Mr Jessica Swanson  Hiram has left a message for Mr Jessica Swanson [phone] to call HIRAM re same  Pt relates her son in law is assisting her as her PA Zee aide and she is happy with his care  She relates her dgt is there as well with her children and helps as needed  CM to follow and assist as needed

## 2022-03-02 ENCOUNTER — PATIENT OUTREACH (OUTPATIENT)
Dept: INTERNAL MEDICINE CLINIC | Facility: CLINIC | Age: 57
End: 2022-03-02

## 2022-03-02 DIAGNOSIS — Z00.8 MEDICAL CLEARANCE FOR PSYCHIATRIC ADMISSION: ICD-10-CM

## 2022-03-02 DIAGNOSIS — R41.82 ALTERED MENTAL STATUS: ICD-10-CM

## 2022-03-02 DIAGNOSIS — I10 HYPERTENSION, UNSPECIFIED TYPE: ICD-10-CM

## 2022-03-02 RX ORDER — BENZTROPINE MESYLATE 1 MG/1
1 TABLET ORAL 2 TIMES DAILY
Qty: 60 TABLET | Refills: 0 | Status: CANCELLED | OUTPATIENT
Start: 2022-03-02 | End: 2022-04-01

## 2022-03-02 RX ORDER — QUETIAPINE FUMARATE 25 MG/1
50 TABLET, FILM COATED ORAL
Qty: 120 TABLET | Refills: 0 | Status: SHIPPED | OUTPATIENT
Start: 2022-03-02 | End: 2022-03-28

## 2022-03-02 NOTE — H&P (VIEW-ONLY)
I have updated her medication list with the ones provided  Can someone please call patient and confirm whether or not she is also taking paroxetine (Paxil) 60 mg daily, prazosin (Minipress) 2 mg daily, and amlodipine (Norvasc) 5 mg daily ?

## 2022-03-02 NOTE — PROGRESS NOTES
Outpatient Care Management Note:    Received call from patient letting me know what medication she has at home and is taking  The following are her mental health medication she is receiving and has an appointment on 3/8 with mental health  Lorazepam 0 5 mg tablets takes 2 tablets (1mg) twice a day    Ingrezza 80 mg daily     Propanolol 20 mg twice a day    Hydroxyzine 50 mg 1 tablets 3 times a day    Buspar 10 mg tablets takes 2 tablets (20 mg) 3 times a day    Benzatropine 1 mg twice a day     Lithium 300 mg once a day     Remeron 7 5 mg as needed at bedtime     Seroquel 50 mg at bedtime    Methocarbamol 500 mg every 6 hours as needed     Aspirin 81 mg daily    Ferrous Sulfate 552 daily     Folic Acid 1 mg daily    Vitamin C daily    Atorvastatin 40 mg daily     Protonix 20 mg daily     Patient reports she thinks she is taking potassium 20 mEq daily and Lyrica 150 mg daily (med list has three times a day) Instructed patient to call me back if she does not have this medications or taking differently  Reviewed ortho appointment for tomorrow and PT on 3/7  Patient does not have any further questions, concerns, or other needs at this time  Pt has my contact # and PCP office # if needed  Pt is agreeable for further outreach

## 2022-03-03 ENCOUNTER — OFFICE VISIT (OUTPATIENT)
Dept: OBGYN CLINIC | Facility: CLINIC | Age: 57
End: 2022-03-03
Payer: MEDICARE

## 2022-03-03 VITALS
HEIGHT: 60 IN | TEMPERATURE: 97.8 F | HEART RATE: 79 BPM | DIASTOLIC BLOOD PRESSURE: 82 MMHG | SYSTOLIC BLOOD PRESSURE: 141 MMHG | WEIGHT: 190.4 LBS | BODY MASS INDEX: 37.38 KG/M2

## 2022-03-03 DIAGNOSIS — F43.10 POST TRAUMATIC STRESS DISORDER: Primary | ICD-10-CM

## 2022-03-03 DIAGNOSIS — G89.29 CHRONIC BILATERAL LOW BACK PAIN WITH BILATERAL SCIATICA: ICD-10-CM

## 2022-03-03 DIAGNOSIS — M17.12 PRIMARY OSTEOARTHRITIS OF LEFT KNEE: Primary | ICD-10-CM

## 2022-03-03 DIAGNOSIS — G89.4 CHRONIC PAIN DISORDER: ICD-10-CM

## 2022-03-03 DIAGNOSIS — M54.41 CHRONIC BILATERAL LOW BACK PAIN WITH BILATERAL SCIATICA: ICD-10-CM

## 2022-03-03 DIAGNOSIS — M79.606 CHRONIC LEG PAIN: ICD-10-CM

## 2022-03-03 DIAGNOSIS — M54.42 CHRONIC BILATERAL LOW BACK PAIN WITH BILATERAL SCIATICA: ICD-10-CM

## 2022-03-03 DIAGNOSIS — G89.29 CHRONIC LEG PAIN: ICD-10-CM

## 2022-03-03 PROCEDURE — 20610 DRAIN/INJ JOINT/BURSA W/O US: CPT | Performed by: PHYSICIAN ASSISTANT

## 2022-03-03 PROCEDURE — 99212 OFFICE O/P EST SF 10 MIN: CPT | Performed by: PHYSICIAN ASSISTANT

## 2022-03-03 RX ORDER — METHYLPREDNISOLONE ACETATE 40 MG/ML
2 INJECTION, SUSPENSION INTRA-ARTICULAR; INTRALESIONAL; INTRAMUSCULAR; SOFT TISSUE
Status: COMPLETED | OUTPATIENT
Start: 2022-03-03 | End: 2022-03-03

## 2022-03-03 RX ORDER — PREGABALIN 150 MG/1
150 CAPSULE ORAL 3 TIMES DAILY
Qty: 120 CAPSULE | Refills: 1
Start: 2022-03-03 | End: 2022-03-08 | Stop reason: SDUPTHER

## 2022-03-03 RX ORDER — PRAZOSIN HYDROCHLORIDE 2 MG/1
2 CAPSULE ORAL
Qty: 30 CAPSULE | Refills: 0 | Status: SHIPPED | OUTPATIENT
Start: 2022-03-03 | End: 2022-04-05 | Stop reason: SDUPTHER

## 2022-03-03 RX ORDER — METHOCARBAMOL 500 MG/1
500 TABLET, FILM COATED ORAL EVERY 6 HOURS PRN
Qty: 60 TABLET | Refills: 1 | Status: SHIPPED | OUTPATIENT
Start: 2022-03-03 | End: 2022-03-17 | Stop reason: HOSPADM

## 2022-03-03 RX ORDER — BUPIVACAINE HYDROCHLORIDE 2.5 MG/ML
2 INJECTION, SOLUTION INFILTRATION; PERINEURAL
Status: COMPLETED | OUTPATIENT
Start: 2022-03-03 | End: 2022-03-03

## 2022-03-03 RX ADMIN — BUPIVACAINE HYDROCHLORIDE 2 ML: 2.5 INJECTION, SOLUTION INFILTRATION; PERINEURAL at 08:47

## 2022-03-03 RX ADMIN — METHYLPREDNISOLONE ACETATE 2 ML: 40 INJECTION, SUSPENSION INTRA-ARTICULAR; INTRALESIONAL; INTRAMUSCULAR; SOFT TISSUE at 08:47

## 2022-03-03 NOTE — PROGRESS NOTES
3/3/22    I called and spoke with patient  She reports she is taking the paxil 60 mg daily, prazosin 2 mg daily, and amlodipine 5 mg daily  Patient reports she did not have all her medication bottles with her when she spoke with me yesterday and declined to get them while on the phone with her and was adamant she was taking these medications  Patient requesting refill for Lyrica and methocarbamol to be sent to pharmacy  Patient seen by ortho today and had left knee steroid injection

## 2022-03-03 NOTE — PROGRESS NOTES
Lyrica was not sent electronically  Please change from no print to normal to send electronically   Thanks

## 2022-03-03 NOTE — PROGRESS NOTES
Assessment/Plan:  1  Primary osteoarthritis of left knee      Orders Placed This Encounter   Procedures    Large joint arthrocentesis       · Patient received left knee steroid injection today  Tolerated the procedure well  Advised to apply ice and avoid strenuous activity for 1-2 days as needed  · R TKA scheduled for next week  No questions at this time  · Continue tylenol prn pain  · Continue to work on motion  Return for R TKA post op appt  I answered all of the patient's questions during the visit and provided education of the patient's condition during the visit  The patient verbalized understanding of the information given and agrees with the plan  This note was dictated using [employer] software  It may contain errors including improperly dictated words  Please contact physician directly for any questions  Subjective   Chief Complaint:   Chief Complaint   Patient presents with    Left Knee - Follow-up       HPI  Samantha De Oliveirazahra Starr is a 60 y o  female who presents for follow up for left knee pain and OA  Patient is here today for left knee steroid injection  She reports persistent bilateral knee pain, right knee worse than left knee  She is scheduled for R TKA next week on 3/9  States she is ready and has no questions regarding the upcoming procedure  She is taking tylenol for pain if needed  She ambulates with a rollator for assistance  Review of Systems  ROS:    See HPI for musculoskeletal review  All other systems reviewed are negative     History:  Past Medical History:   Diagnosis Date    Acid reflux     Anxiety     RESOLVED: [id number]    Arthritis     Bipolar 2 disorder (HCC)     FOLLOWS WITH PSYCHIATRIST   CONTINUE LAMOTRIGINE; RESOLVED: [id number]    Depression     Familial tremor     both hands    Fibromyalgia     LAST ASSESSED: [id number]    GERD (gastroesophageal reflux disease)     Hearing aid worn     left ear    [place] (hard of hearing)     left ear    Hyperlipidemia  Hypertension     Left-sided weakness     Lower back pain     Memory loss of unknown cause     long and short term    Migraine     Obesity     Obesity, Class II, BMI 35-39 9     Overactive bladder     Panic attack     Post traumatic stress disorder     Seasonal allergies     Stroke [facility])     questionable stroke 2009    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    CONT ASA 81 MG DAILY; RESOLVED: [id number]    Urinary incontinence     Wears dentures     partial lower / full upper    Wears glasses      Past Surgical History:   Procedure Laterality Date    BACK SURGERY       SECTION      COLONOSCOPY      RESOLVED: [id number]    EAR SURGERY      EGD      HYSTERECTOMY      MYRINGOTOMY W/ TUBES Left     NECK SURGERY  2019    W[place] CYSTOURETHROSCOPY N/A 2016    Procedure: CYSTOSCOPY, BOTOX INJECTION;  Surgeon: Rich Serna MD;  Location: [facility];  Service: Gynecology    [place] IMPLANT SPINAL NEUROSTIM/ Right 2/10/2021    Procedure: REPLACEMENT IMPLANTABLE PULSE GENERATOR DORSAL SPINAL COLUMN STIMULATOR, RIGHT;  Surgeon: Mini Velasquez MD;  [facility]: [facility];  Service: Neurosurgery    [place] PERCUT IMPLNT Robert Merle Right 2020    Procedure: INSERTION THORACIC DORSAL COLUMN SPINAL CORD STIMULATOR PERCUTANEOUS W IMPLANTABLE PULSE GENERATOR, RIGHT;  Surgeon: Mini Velasquez MD;  Location:  MAIN OR;  Service: Neurosurgery    0 [facility]    UPPER GASTROINTESTINAL ENDOSCOPY  2020     Social History   Social History     Substance and Sexual Activity   Alcohol Use Not Currently    Comment: 2 x year; being a social drinker as per all scripts      Social History     Substance and Sexual Activity   Drug Use No     Social History     Tobacco Use   Smoking Status Never Smoker   Smokeless Tobacco Never Used     Family History:   Family History   Problem Relation Age of Onset    Colon cancer Mother     Alzheimer's disease Father     Stroke Father     Colon cancer Brother     Bipolar disorder Brother     Breast cancer Maternal Aunt     Colon cancer Maternal Aunt     Heart disease Other     Diabetes Other     Hypertension Other     Seizures Son     Depression Paternal Grandfather     No Known Problems Sister     No Known Problems Brother     Thyroid disease Neg Hx        Current Outpatient Medications on File Prior to Visit   Medication Sig Dispense Refill    acetaminophen (TYLENOL) 500 mg tablet Take 1 tablet (500 mg total) by mouth every 6 (six) hours as needed for mild pain 120 tablet 0    amLODIPine (NORVASC) 5 mg tablet Take 1 tablet (5 mg total) by mouth daily 90 tablet 3    AneCream 4 % cream Please specify directions, refills and quantity 30 g 1    atorvastatin (LIPITOR) 40 mg tablet TAKE 1 TABLET BY MOUTH DAILY WITH DINNER 90 tablet 1    busPIRone (BUSPAR) 10 mg tablet Take 20 mg by mouth 3 (three) times a day       Calcium Ascorbate (VITAMIN C) 500 mg tablet Take 1 tablet (500 mg total) by mouth daily 30 tablet 1    [facility] Aspirin Adult Low Dose 81 MG chewable tablet CHEW 1 TABLET BY MOUTH EVERY DAY 90 tablet 3    Diapers & Supplies (Huggies Pull-Ups) MISC Use 3 (three) times a day 100 each 3    Diclofenac Sodium (VOLTAREN) 1 % APPLY 2 GRAMS TO AFFECTED AREA 4 TIMES A  g 5    ferrous sulfate 324 (65 Fe) mg TAKE 1 TABLET (324 MG TOTAL) BY MOUTH IN THE MORNING 90 tablet 1    folic acid (FOLVITE) 1 mg tablet Take 1 tablet (1 mg total) by mouth daily 30 tablet 1    hydrOXYzine HCL (ATARAX) 50 mg tablet Take 50 mg by mouth 3 (three) times a day as needed for itching      lithium carbonate (LITHOBID) 300 mg CR tablet Take 1 tablet (300 mg total) by mouth daily at bedtime 30 tablet 0    LORazepam (ATIVAN) 0 5 mg tablet Take 1 tablet (0 5 mg total) by mouth 2 (two) times a day as needed for anxiety  0    methocarbamol (ROBAXIN) 500 mg tablet Take 1 tablet (500 mg total) by mouth every 6 (six) hours as needed for muscle spasms 60 tablet 1    mirtazapine (REMERON) 7 5 MG tablet Take 1 tablet (7 5 mg total) by mouth daily at bedtime as needed (insomnia) 30 tablet 0    Multiple Vitamin (Daily-Annette Multivitamin) TABS TAKE 1 TABLET BY MOUTH EVERY DAY 30 tablet 1    pantoprazole (PROTONIX) 20 mg tablet Take 1 tablet (20 mg total) by mouth daily in the early morning 30 tablet 0    PARoxetine (PAXIL) 30 mg tablet Take 2 tablets (60 mg total) by mouth daily 60 tablet 0    potassium chloride (K-DUR,KLOR-CON) 20 mEq tablet Take 1 tablet (20 mEq total) by mouth daily 90 tablet 1    pregabalin (LYRICA) 150 mg capsule Take 1 capsule (150 mg total) by mouth 3 (three) times a day 120 capsule 1    propranolol (INDERAL) 20 mg tablet Take 1 tablet (20 mg total) by mouth 2 (two) times a day before breakfast and lunch 90 tablet 3    QUEtiapine (SEROquel) 25 mg tablet Take 2 tablets (50 mg total) by mouth daily at bedtime 120 tablet 0    Valbenazine Tosylate (Ingrezza) 80 MG CAPS Take 80 mg by mouth daily 90 capsule 0     No current facility-administered medications on file prior to visit  No Known Allergies     Objective     /82   Pulse 79   Temp 97 8 °F (36 6 °C)   Ht 5' (1 524 m)   Wt 86 4 kg (190 lb 6 4 oz)   BMI 37 18 kg/m²      PE:  AAOx 3  WDWN  Hearing intact, no drainage from eyes  no audible wheezing  no abdominal distension  LE compartments soft, skin intact    Ortho Exam:  left Knee:   No erythema  + mild swelling  no effusion  no warmth  AROM: 5- 115  Stable to varus/valgus stress    Large joint arthrocentesis: L knee  Universal Protocol:  Consent: Verbal consent obtained    Risks and benefits: risks, benefits and alternatives were discussed  Consent given by: patient  Site marked: the operative site was marked  Supporting Documentation  Indications: pain   Procedure Details  Location: knee - L knee  Preparation: Patient was prepped and draped in the usual sterile fashion  Needle size: 22 G  Ultrasound guidance: no  Approach: anterolateral  Medications administered: 2 mL bupivacaine 0 25 %; 2 mL methylPREDNISolone acetate 40 mg/mL    Patient tolerance: patient tolerated the procedure well with no immediate complications  Dressing:  Sterile dressing applied

## 2022-03-04 ENCOUNTER — TELEPHONE (OUTPATIENT)
Dept: OBGYN CLINIC | Facility: HOSPITAL | Age: 57
End: 2022-03-04

## 2022-03-04 DIAGNOSIS — G89.4 CHRONIC PAIN DISORDER: ICD-10-CM

## 2022-03-04 PROCEDURE — U0003 INFECTIOUS AGENT DETECTION BY NUCLEIC ACID (DNA OR RNA); SEVERE ACUTE RESPIRATORY SYNDROME CORONAVIRUS 2 (SARS-COV-2) (CORONAVIRUS DISEASE [COVID-19]), AMPLIFIED PROBE TECHNIQUE, MAKING USE OF HIGH THROUGHPUT TECHNOLOGIES AS DESCRIBED BY CMS-2020-01-R: HCPCS | Performed by: ORTHOPAEDIC SURGERY

## 2022-03-04 PROCEDURE — U0005 INFEC AGEN DETEC AMPLI PROBE: HCPCS | Performed by: ORTHOPAEDIC SURGERY

## 2022-03-04 RX ORDER — PREGABALIN 150 MG/1
150 CAPSULE ORAL 3 TIMES DAILY
Qty: 120 CAPSULE | Refills: 1 | Status: CANCELLED | OUTPATIENT
Start: 2022-03-04 | End: 2022-05-03

## 2022-03-04 NOTE — PROGRESS NOTES
Patient called office requesting refill on her Lyrica  Script was not sent electronically yesterday  Patient also asking about her pull ups  Patient made aware she needs to go through a DME company ([facility] who she is setup with) to get her pull ups and for it to be processed by her insurance  Patient made aware at local pharmacy she would need to purchase out of pocket  Patient voiced understanding

## 2022-03-04 NOTE — TELEPHONE ENCOUNTER
Pt contacted and VM left  We have outstanding lab work for upcoming surgery 3/9  Attempted to discuss with patient that this is needed by PAT department and MLJ review prior to surgery  Requested a call back to discuss

## 2022-03-04 NOTE — PROGRESS NOTES
I am unable to send Lyrica to the pharmacy because it requires a FRITZ number  An attending in clinic will have to address this issue

## 2022-03-05 ENCOUNTER — LAB (OUTPATIENT)
Dept: LAB | Facility: HOSPITAL | Age: 57
End: 2022-03-05
Attending: ORTHOPAEDIC SURGERY
Payer: MEDICARE

## 2022-03-05 DIAGNOSIS — Z79.899 LITHIUM LEVEL CHECKED EVERY SIX MONTHS: ICD-10-CM

## 2022-03-05 DIAGNOSIS — Z01.818 ENCOUNTER FOR PREADMISSION TESTING: ICD-10-CM

## 2022-03-05 DIAGNOSIS — Z01.818 PREOPERATIVE TESTING: ICD-10-CM

## 2022-03-05 DIAGNOSIS — M17.11 PRIMARY OSTEOARTHRITIS OF RIGHT KNEE: ICD-10-CM

## 2022-03-05 LAB
ABO GROUP BLD: NORMAL
ALBUMIN SERPL BCP-MCNC: 4.4 G/DL (ref 3–5.2)
ALP SERPL-CCNC: 92 U/L (ref 43–122)
ALT SERPL W P-5'-P-CCNC: 16 U/L
ANION GAP SERPL CALCULATED.3IONS-SCNC: 8 MMOL/L (ref 5–14)
APTT PPP: 31 SECONDS (ref 23–37)
AST SERPL W P-5'-P-CCNC: 20 U/L (ref 14–36)
BACTERIA UR QL AUTO: ABNORMAL /HPF
BASOPHILS # BLD AUTO: 0 THOUSANDS/ΜL (ref 0–0.1)
BASOPHILS NFR BLD AUTO: 1 % (ref 0–1)
BILIRUB SERPL-MCNC: 1.09 MG/DL
BILIRUB UR QL STRIP: NEGATIVE
BLD GP AB SCN SERPL QL: NEGATIVE
BUN SERPL-MCNC: 6 MG/DL (ref 5–25)
CALCIUM SERPL-MCNC: 8.6 MG/DL (ref 8.4–10.2)
CHLORIDE SERPL-SCNC: 105 MMOL/L (ref 97–108)
CLARITY UR: CLEAR
CO2 SERPL-SCNC: 29 MMOL/L (ref 22–30)
COLOR UR: YELLOW
CREAT SERPL-MCNC: 0.75 MG/DL (ref 0.6–1.2)
CRP SERPL QL: 7.5 MG/L
EOSINOPHIL # BLD AUTO: 0.1 THOUSAND/ΜL (ref 0–0.4)
EOSINOPHIL NFR BLD AUTO: 2 % (ref 0–6)
ERYTHROCYTE [DISTWIDTH] IN BLOOD BY AUTOMATED COUNT: 14.9 %
EST. AVERAGE GLUCOSE BLD GHB EST-MCNC: 88 MG/DL
FERRITIN SERPL-MCNC: 119 NG/ML (ref 8–388)
GFR SERPL CREATININE-BSD FRML MDRD: 88 ML/MIN/1.73SQ M
GLUCOSE P FAST SERPL-MCNC: 119 MG/DL (ref 70–99)
GLUCOSE UR STRIP-MCNC: NEGATIVE MG/DL
HBA1C MFR BLD: 4.7 %
HCT VFR BLD AUTO: 42.9 % (ref 36–46)
HGB BLD-MCNC: 14.3 G/DL (ref 12–16)
HGB UR QL STRIP.AUTO: 10
INR PPP: 1.02 (ref 0.84–1.19)
IRON SATN MFR SERPL: 28 % (ref 15–50)
IRON SERPL-MCNC: 83 UG/DL (ref 50–170)
KETONES UR STRIP-MCNC: NEGATIVE MG/DL
LEUKOCYTE ESTERASE UR QL STRIP: 100
LITHIUM SERPL-SCNC: <0.2 MMOL/L (ref 0.6–1.2)
LYMPHOCYTES # BLD AUTO: 0.8 THOUSANDS/ΜL (ref 0.5–4)
LYMPHOCYTES NFR BLD AUTO: 22 % (ref 25–45)
MCH RBC QN AUTO: 28.7 PG (ref 26–34)
MCHC RBC AUTO-ENTMCNC: 33.4 G/DL (ref 31–36)
MCV RBC AUTO: 86 FL (ref 80–100)
MONOCYTES # BLD AUTO: 0.2 THOUSAND/ΜL (ref 0.2–0.9)
MONOCYTES NFR BLD AUTO: 6 % (ref 1–10)
MUCOUS THREADS UR QL AUTO: ABNORMAL
NEUTROPHILS # BLD AUTO: 2.5 THOUSANDS/ΜL (ref 1.8–7.8)
NEUTS SEG NFR BLD AUTO: 69 % (ref 45–65)
NITRITE UR QL STRIP: NEGATIVE
NON-SQ EPI CELLS URNS QL MICRO: ABNORMAL /HPF
PH UR STRIP.AUTO: 7 [PH]
PLATELET # BLD AUTO: 371 THOUSANDS/UL (ref 150–450)
PMV BLD AUTO: 7.8 FL (ref 8.9–12.7)
POTASSIUM SERPL-SCNC: 3.1 MMOL/L (ref 3.6–5)
PROT SERPL-MCNC: 7.8 G/DL (ref 5.9–8.4)
PROT UR STRIP-MCNC: NEGATIVE MG/DL
PROTHROMBIN TIME: 13 SECONDS (ref 11.6–14.5)
RBC # BLD AUTO: 4.99 MILLION/UL (ref 4–5.2)
RBC #/AREA URNS AUTO: ABNORMAL /HPF
RH BLD: NEGATIVE
SODIUM SERPL-SCNC: 142 MMOL/L (ref 137–147)
SP GR UR STRIP.AUTO: 1.01 (ref 1–1.04)
SPECIMEN EXPIRATION DATE: NORMAL
TIBC SERPL-MCNC: 301 UG/DL (ref 250–450)
UROBILINOGEN UA: NEGATIVE MG/DL
WBC # BLD AUTO: 3.7 THOUSAND/UL (ref 4.5–11)
WBC #/AREA URNS AUTO: ABNORMAL /HPF

## 2022-03-05 PROCEDURE — 82728 ASSAY OF FERRITIN: CPT

## 2022-03-05 PROCEDURE — 83550 IRON BINDING TEST: CPT

## 2022-03-05 PROCEDURE — 85025 COMPLETE CBC W/AUTO DIFF WBC: CPT

## 2022-03-05 PROCEDURE — 80178 ASSAY OF LITHIUM: CPT

## 2022-03-05 PROCEDURE — 85610 PROTHROMBIN TIME: CPT

## 2022-03-05 PROCEDURE — 83540 ASSAY OF IRON: CPT

## 2022-03-05 PROCEDURE — 86900 BLOOD TYPING SEROLOGIC ABO: CPT

## 2022-03-05 PROCEDURE — 81001 URINALYSIS AUTO W/SCOPE: CPT | Performed by: NEUROLOGICAL SURGERY

## 2022-03-05 PROCEDURE — 86901 BLOOD TYPING SEROLOGIC RH(D): CPT

## 2022-03-05 PROCEDURE — 86850 RBC ANTIBODY SCREEN: CPT

## 2022-03-05 PROCEDURE — 85730 THROMBOPLASTIN TIME PARTIAL: CPT

## 2022-03-05 PROCEDURE — 83036 HEMOGLOBIN GLYCOSYLATED A1C: CPT

## 2022-03-05 PROCEDURE — 80053 COMPREHEN METABOLIC PANEL: CPT

## 2022-03-05 PROCEDURE — 81003 URINALYSIS AUTO W/O SCOPE: CPT | Performed by: NEUROLOGICAL SURGERY

## 2022-03-05 PROCEDURE — 36415 COLL VENOUS BLD VENIPUNCTURE: CPT

## 2022-03-05 PROCEDURE — 86140 C-REACTIVE PROTEIN: CPT

## 2022-03-07 ENCOUNTER — TELEPHONE (OUTPATIENT)
Dept: INTERNAL MEDICINE CLINIC | Facility: CLINIC | Age: 57
End: 2022-03-07

## 2022-03-07 ENCOUNTER — EVALUATION (OUTPATIENT)
Dept: PHYSICAL THERAPY | Facility: CLINIC | Age: 57
End: 2022-03-07
Payer: MEDICARE

## 2022-03-07 ENCOUNTER — ANESTHESIA EVENT (OUTPATIENT)
Dept: PERIOP | Facility: HOSPITAL | Age: 57
DRG: 470 | End: 2022-03-07
Payer: MEDICARE

## 2022-03-07 DIAGNOSIS — Z01.818 PREOPERATIVE TESTING: ICD-10-CM

## 2022-03-07 DIAGNOSIS — M17.11 PRIMARY OSTEOARTHRITIS OF RIGHT KNEE: ICD-10-CM

## 2022-03-07 DIAGNOSIS — Z01.818 ENCOUNTER FOR PREADMISSION TESTING: ICD-10-CM

## 2022-03-07 PROCEDURE — 97161 PT EVAL LOW COMPLEX 20 MIN: CPT | Performed by: PHYSICAL THERAPIST

## 2022-03-07 PROCEDURE — 97110 THERAPEUTIC EXERCISES: CPT | Performed by: PHYSICAL THERAPIST

## 2022-03-07 NOTE — TELEPHONE ENCOUNTER
Folder Color- [facility]    Name of Form- [facility]    Form to be filled out by- Dr Peter Pennington    Form to be Faxed to [phone]    Patient made aware of 10 business day policy         *Pt's surgery is scheduled for 3/9/22

## 2022-03-07 NOTE — TELEPHONE ENCOUNTER
Estephanie from [address] [place] [address] [address] is calling in regards to patient's clearance for surgery on 3/9/22  She needs office notes faxed to her but upon review of PCP notes, it is noted that patient was unable to verify medications and she was to call back and verify them  Patient did speak to Malinda Eller RN Case manager and did review them with her (verbalized with nurse)  PCP will have to addend his notes and I will fax them back to Estephanie [phone]  Please have PCP do that this afternoon when he comes in being that she is scheduled for the surgery on Wednesday 3/9/22  Any questions you may contact Estephanie directly at [phone]

## 2022-03-07 NOTE — PROGRESS NOTES
PT Evaluation     Today's date: 3/7/2022  Patient name: Kash Neely  : 1965  MRN: [id number]  Referring provider: Dorothy Fall  Dx:   Encounter Diagnosis     ICD-10-CM    1  Primary osteoarthritis of right knee  M17 11 Ambulatory referral to Physical Therapy   2  Encounter for preadmission testing  Z01 [id number] Ambulatory referral to Physical Therapy   3  Preoperative testing  Z01 818 Ambulatory referral to Physical Therapy                  Assessment  Assessment details: Pt is a 60y o  year old female presenting to physical therapy for pre- op therapy evaluation for R TKA scheduled for 3/9/22  She presents with the following impairments: gait deviations using rollator, LE weakness, ROM deficits in b/l knees, TTP along R knee jt line and patella, and hypomobility affecting her function with walking, standing, long periods, transferring out of chair, squatting, navigating stairs, and with community ambulation  Pt will benefit from skilled physical therapy to address functional limitations noted in evaluation and meet patient goals  Impairments: abnormal gait, abnormal or restricted ROM, abnormal movement, activity intolerance, impaired balance, impaired physical strength, lacks appropriate home exercise program and pain with function    Symptom irritability: moderate  Goals  ST  Pt will improve quad activation to good  2  Pt will demonstrate good walking mechanics with rollator  LT  Pt will improve R knee ext strength to 4+/5 for improved walking and squatting ability  2  Pt will improve R knee flexion to 120 degrees for improved ability to descend stairs    3  Pt will be I w HEP    Plan  Patient would benefit from: PT eval and skilled physical therapy  Planned modality interventions: biofeedback, electrical stimulation/Russian stimulation, TENS, thermotherapy: hydrocollator packs, cryotherapy and unattended electrical stimulation  Planned therapy interventions: abdominal trunk stabilization, joint mobilization, manual therapy, balance, balance/weight bearing training, neuromuscular re-education, body mechanics training, strengthening, patient education, stretching, therapeutic activities, flexibility, functional ROM exercises, gait training, home exercise program and therapeutic exercise  Frequency: 2x week  Treatment plan discussed with: family        Subjective Evaluation    History of Present Illness  Mechanism of injury: Pt reports chronic pain in her R knee that bothers her with walking and navigating stairs  She also has Hx of major back surgery about 3 years ago  She ambulates slowly using rollator and reports hx of falls and imbalance  She is scheduled for R TKA on 3/9/22  Recurrent probem    Pain  At worst pain ratin      Diagnostic Tests  X-ray: abnormal        Objective     Tenderness     Right Knee   Tenderness in the inferior patella, lateral joint line, medial joint line and superior patella  Active Range of Motion   Left Knee   Flexion: 110 degrees with pain  Extension: 10 degrees with pain  Extensor lag: 15 degrees with pain    Right Knee   Flexion: 115 degrees with pain  Extension: 15 degrees with pain  Extensor la degrees with pain    Passive Range of Motion   Left Knee   Flexion: 125 degrees with pain  Extension: 10 degrees with pain    Right Knee   Flexion: 120 degrees with pain  Extension: 10 degrees with pain    Mobility   Patellar Mobility:   Left Knee   Hypomobile: left medial, left lateral, left superior and left inferior    Right Knee   Hypomobile: medial, lateral, superior and inferior     Strength/Myotome Testing     Left Knee   Flexion: 3+  Extension: 3+  Quadriceps contraction: poor    Right Knee   Flexion: 3+  Extension: 4-  Quadriceps contraction: poor    Additional Strength Details  Poor squatting ability with very limited depth      Ambulation   Weight-Bearing Status   Assistive device used: four-wheeled walker    Observational Gait   Gait: antalgic and asymmetric   Decreased walking speed and stride length                Precautions: R TKA on 3/9/2022    Date 3/7            Visit # 1            FOTO NV             Re-eval IE              Manuals 3/7            R knee PROM SF            PFJ Mobs                                       Neuro Re-Ed 3/7            Clams             Bridges             SLS                                                                 Ther Ex 3/7            Bike             Quad Sets 5x10"            SLR 2x*            Heel Slides 5x            Seated Heel Slides             Knee Ext str             LAQ's 10x                         Ther Activity 3/7            Squat                          Gait Training                                       Modalities

## 2022-03-07 NOTE — PROGRESS NOTES
I spoke with [facility] pharmacy and the pharmacist confirmed her last script was Nov 2021 for a 7 day supply  I spoke with Brian Brand Who spoke with the patient previously and she said the patient said she was taking it but refused to go and look for a bottle so she could give all of the information   It appears she has not had it since Nov 2021

## 2022-03-07 NOTE — PROGRESS NOTES
It appears that Lyrica has not been Rx'd for some time  Can we confirm with pharmacy? Could not find in PDMP      Dr Consuelo Ceja

## 2022-03-08 ENCOUNTER — TELEPHONE (OUTPATIENT)
Dept: OBGYN CLINIC | Facility: CLINIC | Age: 57
End: 2022-03-08

## 2022-03-08 ENCOUNTER — PATIENT OUTREACH (OUTPATIENT)
Dept: INTERNAL MEDICINE CLINIC | Facility: CLINIC | Age: 57
End: 2022-03-08

## 2022-03-08 NOTE — TELEPHONE ENCOUNTER
Patient called back to see if we called her for surgery time yet      I advised after 4pm today    [phone]

## 2022-03-08 NOTE — TELEPHONE ENCOUNTER
Dr Quita Hall called to ask what time and where to report for surgery  Informed patient she will received a call with details late this afternoon    Thank you

## 2022-03-08 NOTE — TELEPHONE ENCOUNTER
I called the patient and reviewed her lab work with her  In regards to the abnormal UA, she is currently asymptomatic  Denies any dysuria, hematuria, frequency or any other complaints  I suspect the UA is contaminant as epithelial cells are present  In regards to her hypokalemia, Samantha informed me that she has not been taking her potassium for "quite a bit " I instructed her to take 40 mEq today and 40 mEq tomorrow before surgery  Patient verbalizes understanding and will take her potassium  I called Estephanie from ortho office and relayed this information to her  I suggest that Samantha have STAT BMP done prior to surgery tomorrow to assess her electrolytes  Otherwise, I believe Samantha is stable and medically optimized to have the surgery tomorrow

## 2022-03-08 NOTE — TELEPHONE ENCOUNTER
Completed form and office note from 2/28/22 faxed to [phone]  Fax confirmation received  Form placed in scanning folder to scan into chart

## 2022-03-08 NOTE — TELEPHONE ENCOUNTER
Patient called to state she didn't get a call with a surgery time for tomorrow  I transferred her to [facility] in Same day surgery

## 2022-03-08 NOTE — TELEPHONE ENCOUNTER
Estephanie from surgery sched  Called stating they did not receive fax  Transferred call to Carmel Ovalles RN for any questions and also scanned pre- op clearance into chart so it could be seen under media

## 2022-03-08 NOTE — PROGRESS NOTES
Outpatient Care Management Note:    Spoke with Estephanie from ortho office regarding pre-op clearance for patient for tomorrow  I did reach out to Dr Ammy Blas regarding this  Please see telephone encounter from 3/7/22 regarding form for pre op clearance for additional information

## 2022-03-08 NOTE — TELEPHONE ENCOUNTER
Spoke with Estephanie from ortho office  Pre-op form faxed for 3rd time and requested for clerical staff to scan into chart incase fax does not go through again  Estephanie is requesting if PCP reviewed lab work that was completed on 3/5/22 and saw her potassium was low at 3 1 and that patient had UA C&S that shows leukocytes, blood, epithelial cells, bacteria, and mucus threads  Per chart review Neurosurgery ordered urine test 1/21/21 and patient had completed on 3/5/22  Per Estephanie OR was going to take patient off schedule if they do not hear back from PCP if patient is further cleared based of review of lab results  Per Estephanie they need to know by noon today 3/8  [place] text sent to Dr Basil Cueva to make aware as he is not at PCP office until this afternoon  Estephanie can be reached at [phone]

## 2022-03-08 NOTE — TELEPHONE ENCOUNTER
Form completed by Dr Drummond Brought  Form given to Rito Almonte RN  to fax and give to clerical staff to scan into patient's chart

## 2022-03-09 ENCOUNTER — HOSPITAL ENCOUNTER (INPATIENT)
Facility: HOSPITAL | Age: 57
LOS: 7 days | Discharge: NURSING, REHAB OR LONG-TERM CARE | DRG: 470 | End: 2022-03-17
Attending: ORTHOPAEDIC SURGERY | Admitting: ORTHOPAEDIC SURGERY
Payer: MEDICARE

## 2022-03-09 ENCOUNTER — ANESTHESIA (OUTPATIENT)
Dept: PERIOP | Facility: HOSPITAL | Age: 57
DRG: 470 | End: 2022-03-09
Payer: MEDICARE

## 2022-03-09 DIAGNOSIS — F31.81 BIPOLAR II DISORDER (HCC): ICD-10-CM

## 2022-03-09 DIAGNOSIS — E78.2 MIXED HYPERLIPIDEMIA: ICD-10-CM

## 2022-03-09 DIAGNOSIS — F43.10 POST TRAUMATIC STRESS DISORDER: ICD-10-CM

## 2022-03-09 DIAGNOSIS — Z96.651 STATUS POST TOTAL KNEE REPLACEMENT, RIGHT: ICD-10-CM

## 2022-03-09 DIAGNOSIS — I10 HTN (HYPERTENSION): ICD-10-CM

## 2022-03-09 DIAGNOSIS — Z96.89 STATUS POST INSERTION OF SPINAL CORD STIMULATOR: ICD-10-CM

## 2022-03-09 DIAGNOSIS — F41.0 PANIC DISORDER WITHOUT AGORAPHOBIA: ICD-10-CM

## 2022-03-09 DIAGNOSIS — M79.7 FIBROMYALGIA: ICD-10-CM

## 2022-03-09 DIAGNOSIS — K59.00 CONSTIPATION: ICD-10-CM

## 2022-03-09 DIAGNOSIS — G89.29 CHRONIC BACK PAIN: ICD-10-CM

## 2022-03-09 DIAGNOSIS — G47.33 OSA (OBSTRUCTIVE SLEEP APNEA): ICD-10-CM

## 2022-03-09 DIAGNOSIS — I63.9 CEREBROVASCULAR ACCIDENT (CVA), UNSPECIFIED MECHANISM (HCC): Primary | ICD-10-CM

## 2022-03-09 DIAGNOSIS — F33.1 MAJOR DEPRESSIVE DISORDER, RECURRENT EPISODE, MODERATE DEGREE (HCC): ICD-10-CM

## 2022-03-09 DIAGNOSIS — F41.9 ANXIETY: ICD-10-CM

## 2022-03-09 DIAGNOSIS — I10 ESSENTIAL HYPERTENSION: ICD-10-CM

## 2022-03-09 DIAGNOSIS — E55.9 VITAMIN D DEFICIENCY: ICD-10-CM

## 2022-03-09 DIAGNOSIS — Z96.651 S/P TOTAL KNEE ARTHROPLASTY, RIGHT: ICD-10-CM

## 2022-03-09 DIAGNOSIS — K21.9 GERD (GASTROESOPHAGEAL REFLUX DISEASE): ICD-10-CM

## 2022-03-09 DIAGNOSIS — G24.01 TARDIVE DYSKINESIA: ICD-10-CM

## 2022-03-09 DIAGNOSIS — M54.9 CHRONIC BACK PAIN: ICD-10-CM

## 2022-03-09 PROBLEM — E66.01 MORBID OBESITY WITH BMI OF 40.0-44.9, ADULT (HCC): Status: RESOLVED | Noted: 2020-02-06 | Resolved: 2022-03-09

## 2022-03-09 LAB
ANION GAP SERPL CALCULATED.3IONS-SCNC: 11 MMOL/L (ref 4–13)
BUN SERPL-MCNC: 5 MG/DL (ref 5–25)
CALCIUM SERPL-MCNC: 8.7 MG/DL (ref 8.3–10.1)
CHLORIDE SERPL-SCNC: 105 MMOL/L (ref 100–108)
CO2 SERPL-SCNC: 26 MMOL/L (ref 21–32)
CREAT SERPL-MCNC: 0.81 MG/DL (ref 0.6–1.3)
FLUAV RNA RESP QL NAA+PROBE: NEGATIVE
FLUBV RNA RESP QL NAA+PROBE: NEGATIVE
GFR SERPL CREATININE-BSD FRML MDRD: 80 ML/MIN/1.73SQ M
GLUCOSE P FAST SERPL-MCNC: 103 MG/DL (ref 65–99)
GLUCOSE SERPL-MCNC: 103 MG/DL (ref 65–140)
POTASSIUM SERPL-SCNC: 3.7 MMOL/L (ref 3.5–5.3)
RSV RNA RESP QL NAA+PROBE: NEGATIVE
SARS-COV-2 RNA RESP QL NAA+PROBE: NEGATIVE
SODIUM SERPL-SCNC: 142 MMOL/L (ref 136–145)

## 2022-03-09 PROCEDURE — C1713 ANCHOR/SCREW BN/BN,TIS/BN: HCPCS | Performed by: ORTHOPAEDIC SURGERY

## 2022-03-09 PROCEDURE — C1776 JOINT DEVICE (IMPLANTABLE): HCPCS | Performed by: ORTHOPAEDIC SURGERY

## 2022-03-09 PROCEDURE — 27447 TOTAL KNEE ARTHROPLASTY: CPT | Performed by: PHYSICIAN ASSISTANT

## 2022-03-09 PROCEDURE — 0SRC0J9 REPLACEMENT OF RIGHT KNEE JOINT WITH SYNTHETIC SUBSTITUTE, CEMENTED, OPEN APPROACH: ICD-10-PCS | Performed by: ORTHOPAEDIC SURGERY

## 2022-03-09 PROCEDURE — 0241U HB NFCT DS VIR RESP RNA 4 TRGT: CPT | Performed by: ORTHOPAEDIC SURGERY

## 2022-03-09 PROCEDURE — 27447 TOTAL KNEE ARTHROPLASTY: CPT | Performed by: ORTHOPAEDIC SURGERY

## 2022-03-09 PROCEDURE — 97530 THERAPEUTIC ACTIVITIES: CPT

## 2022-03-09 PROCEDURE — 80048 BASIC METABOLIC PNL TOTAL CA: CPT | Performed by: ORTHOPAEDIC SURGERY

## 2022-03-09 PROCEDURE — 97163 PT EVAL HIGH COMPLEX 45 MIN: CPT

## 2022-03-09 DEVICE — SMARTSET GMV HIGH PERFORMANCE GENTAMICIN MEDIUM VISCOSITY BONE CEMENT 40G
Type: IMPLANTABLE DEVICE | Site: KNEE | Status: FUNCTIONAL
Brand: SMARTSET

## 2022-03-09 DEVICE — ATTUNE PATELLA MEDIALIZED DOME 35MM CEMENTED AOX
Type: IMPLANTABLE DEVICE | Site: KNEE | Status: FUNCTIONAL
Brand: ATTUNE

## 2022-03-09 DEVICE — ATTUNE KNEE SYSTEM TIBIAL BASE ROTATING PLATFORM SIZE 4 CEMENTED
Type: IMPLANTABLE DEVICE | Site: KNEE | Status: FUNCTIONAL
Brand: ATTUNE

## 2022-03-09 DEVICE — ATTUNE KNEE SYSTEM FEMORAL CRUCIATE RETAINING SIZE 3 RIGHT CEMENTED
Type: IMPLANTABLE DEVICE | Site: KNEE | Status: FUNCTIONAL
Brand: ATTUNE

## 2022-03-09 DEVICE — ATTUNE KNEE SYSTEM TIBIAL INSERT ROTATING PLATFORM CRUCIATE RETAINING SIZE 3 7MM AOX
Type: IMPLANTABLE DEVICE | Site: KNEE | Status: FUNCTIONAL
Brand: ATTUNE

## 2022-03-09 RX ORDER — HYDROMORPHONE HCL/PF 1 MG/ML
0.5 SYRINGE (ML) INJECTION
Status: DISCONTINUED | OUTPATIENT
Start: 2022-03-09 | End: 2022-03-09 | Stop reason: HOSPADM

## 2022-03-09 RX ORDER — ACETAMINOPHEN 325 MG/1
975 TABLET ORAL ONCE
Status: COMPLETED | OUTPATIENT
Start: 2022-03-09 | End: 2022-03-09

## 2022-03-09 RX ORDER — CEFAZOLIN SODIUM 2 G/50ML
2000 SOLUTION INTRAVENOUS ONCE
Status: COMPLETED | OUTPATIENT
Start: 2022-03-09 | End: 2022-03-09

## 2022-03-09 RX ORDER — POTASSIUM CHLORIDE 20 MEQ/1
20 TABLET, EXTENDED RELEASE ORAL DAILY
Status: DISCONTINUED | OUTPATIENT
Start: 2022-03-10 | End: 2022-03-17 | Stop reason: HOSPADM

## 2022-03-09 RX ORDER — ACETAMINOPHEN 325 MG/1
975 TABLET ORAL EVERY 8 HOURS SCHEDULED
Status: DISCONTINUED | OUTPATIENT
Start: 2022-03-09 | End: 2022-03-17 | Stop reason: HOSPADM

## 2022-03-09 RX ORDER — FOLIC ACID 1 MG/1
1 TABLET ORAL DAILY
Status: DISCONTINUED | OUTPATIENT
Start: 2022-03-10 | End: 2022-03-17 | Stop reason: HOSPADM

## 2022-03-09 RX ORDER — DOCUSATE SODIUM 100 MG/1
100 CAPSULE, LIQUID FILLED ORAL 2 TIMES DAILY
Status: DISCONTINUED | OUTPATIENT
Start: 2022-03-09 | End: 2022-03-10

## 2022-03-09 RX ORDER — CHLORHEXIDINE GLUCONATE 4 G/100ML
SOLUTION TOPICAL DAILY PRN
Status: DISCONTINUED | OUTPATIENT
Start: 2022-03-09 | End: 2022-03-09 | Stop reason: HOSPADM

## 2022-03-09 RX ORDER — TRANEXAMIC ACID 100 MG/ML
INJECTION, SOLUTION INTRAVENOUS AS NEEDED
Status: DISCONTINUED | OUTPATIENT
Start: 2022-03-09 | End: 2022-03-09

## 2022-03-09 RX ORDER — LIDOCAINE HYDROCHLORIDE 10 MG/ML
INJECTION, SOLUTION EPIDURAL; INFILTRATION; INTRACAUDAL; PERINEURAL AS NEEDED
Status: DISCONTINUED | OUTPATIENT
Start: 2022-03-09 | End: 2022-03-09

## 2022-03-09 RX ORDER — QUETIAPINE FUMARATE 25 MG/1
50 TABLET, FILM COATED ORAL
Status: DISCONTINUED | OUTPATIENT
Start: 2022-03-09 | End: 2022-03-17 | Stop reason: HOSPADM

## 2022-03-09 RX ORDER — SODIUM CHLORIDE 9 MG/ML
75 INJECTION, SOLUTION INTRAVENOUS CONTINUOUS
Status: DISCONTINUED | OUTPATIENT
Start: 2022-03-09 | End: 2022-03-09

## 2022-03-09 RX ORDER — FENTANYL CITRATE/PF 50 MCG/ML
25 SYRINGE (ML) INJECTION
Status: DISCONTINUED | OUTPATIENT
Start: 2022-03-09 | End: 2022-03-09 | Stop reason: HOSPADM

## 2022-03-09 RX ORDER — MAGNESIUM HYDROXIDE 1200 MG/15ML
LIQUID ORAL AS NEEDED
Status: DISCONTINUED | OUTPATIENT
Start: 2022-03-09 | End: 2022-03-09 | Stop reason: HOSPADM

## 2022-03-09 RX ORDER — OXYCODONE HYDROCHLORIDE 5 MG/1
5 TABLET ORAL EVERY 4 HOURS PRN
Status: DISCONTINUED | OUTPATIENT
Start: 2022-03-09 | End: 2022-03-17 | Stop reason: HOSPADM

## 2022-03-09 RX ORDER — CHLORHEXIDINE GLUCONATE 0.12 MG/ML
15 RINSE ORAL ONCE
Status: COMPLETED | OUTPATIENT
Start: 2022-03-09 | End: 2022-03-09

## 2022-03-09 RX ORDER — CALCIUM CARBONATE 200(500)MG
1000 TABLET,CHEWABLE ORAL DAILY PRN
Status: DISCONTINUED | OUTPATIENT
Start: 2022-03-09 | End: 2022-03-17 | Stop reason: HOSPADM

## 2022-03-09 RX ORDER — PROPRANOLOL HYDROCHLORIDE 20 MG/1
20 TABLET ORAL
Status: DISCONTINUED | OUTPATIENT
Start: 2022-03-10 | End: 2022-03-14

## 2022-03-09 RX ORDER — ONDANSETRON 2 MG/ML
4 INJECTION INTRAMUSCULAR; INTRAVENOUS ONCE AS NEEDED
Status: DISCONTINUED | OUTPATIENT
Start: 2022-03-09 | End: 2022-03-09 | Stop reason: HOSPADM

## 2022-03-09 RX ORDER — CEFAZOLIN SODIUM 1 G/50ML
1000 SOLUTION INTRAVENOUS EVERY 8 HOURS
Status: COMPLETED | OUTPATIENT
Start: 2022-03-09 | End: 2022-03-10

## 2022-03-09 RX ORDER — DEXAMETHASONE SODIUM PHOSPHATE 10 MG/ML
INJECTION, SOLUTION INTRAMUSCULAR; INTRAVENOUS AS NEEDED
Status: DISCONTINUED | OUTPATIENT
Start: 2022-03-09 | End: 2022-03-09

## 2022-03-09 RX ORDER — ONDANSETRON 2 MG/ML
4 INJECTION INTRAMUSCULAR; INTRAVENOUS ONCE AS NEEDED
Status: CANCELLED | OUTPATIENT
Start: 2022-03-09

## 2022-03-09 RX ORDER — ROCURONIUM BROMIDE 10 MG/ML
INJECTION, SOLUTION INTRAVENOUS AS NEEDED
Status: DISCONTINUED | OUTPATIENT
Start: 2022-03-09 | End: 2022-03-09

## 2022-03-09 RX ORDER — SENNOSIDES 8.6 MG
1 TABLET ORAL
Status: DISCONTINUED | OUTPATIENT
Start: 2022-03-09 | End: 2022-03-10

## 2022-03-09 RX ORDER — HYDROXYZINE HYDROCHLORIDE 25 MG/1
50 TABLET, FILM COATED ORAL 3 TIMES DAILY PRN
Status: DISCONTINUED | OUTPATIENT
Start: 2022-03-09 | End: 2022-03-17 | Stop reason: HOSPADM

## 2022-03-09 RX ORDER — MEPERIDINE HYDROCHLORIDE 25 MG/ML
12.5 INJECTION INTRAMUSCULAR; INTRAVENOUS; SUBCUTANEOUS
Status: DISCONTINUED | OUTPATIENT
Start: 2022-03-09 | End: 2022-03-09 | Stop reason: HOSPADM

## 2022-03-09 RX ORDER — ATORVASTATIN CALCIUM 40 MG/1
40 TABLET, FILM COATED ORAL
Status: DISCONTINUED | OUTPATIENT
Start: 2022-03-09 | End: 2022-03-17 | Stop reason: HOSPADM

## 2022-03-09 RX ORDER — NEOSTIGMINE METHYLSULFATE 1 MG/ML
INJECTION INTRAVENOUS AS NEEDED
Status: DISCONTINUED | OUTPATIENT
Start: 2022-03-09 | End: 2022-03-09

## 2022-03-09 RX ORDER — SODIUM CHLORIDE, SODIUM LACTATE, POTASSIUM CHLORIDE, CALCIUM CHLORIDE 600; 310; 30; 20 MG/100ML; MG/100ML; MG/100ML; MG/100ML
125 INJECTION, SOLUTION INTRAVENOUS CONTINUOUS
Status: DISCONTINUED | OUTPATIENT
Start: 2022-03-09 | End: 2022-03-09

## 2022-03-09 RX ORDER — GLYCOPYRROLATE 0.2 MG/ML
INJECTION INTRAMUSCULAR; INTRAVENOUS AS NEEDED
Status: DISCONTINUED | OUTPATIENT
Start: 2022-03-09 | End: 2022-03-09

## 2022-03-09 RX ORDER — FENTANYL CITRATE 50 UG/ML
INJECTION, SOLUTION INTRAMUSCULAR; INTRAVENOUS AS NEEDED
Status: DISCONTINUED | OUTPATIENT
Start: 2022-03-09 | End: 2022-03-09

## 2022-03-09 RX ORDER — PAROXETINE HYDROCHLORIDE 20 MG/1
60 TABLET, FILM COATED ORAL DAILY
Status: DISCONTINUED | OUTPATIENT
Start: 2022-03-10 | End: 2022-03-17 | Stop reason: HOSPADM

## 2022-03-09 RX ORDER — BUSPIRONE HYDROCHLORIDE 10 MG/1
20 TABLET ORAL 3 TIMES DAILY
Status: DISCONTINUED | OUTPATIENT
Start: 2022-03-09 | End: 2022-03-17 | Stop reason: HOSPADM

## 2022-03-09 RX ORDER — OXYCODONE HYDROCHLORIDE 10 MG/1
10 TABLET ORAL EVERY 4 HOURS PRN
Status: DISCONTINUED | OUTPATIENT
Start: 2022-03-09 | End: 2022-03-17 | Stop reason: HOSPADM

## 2022-03-09 RX ORDER — PANTOPRAZOLE SODIUM 20 MG/1
20 TABLET, DELAYED RELEASE ORAL
Status: DISCONTINUED | OUTPATIENT
Start: 2022-03-10 | End: 2022-03-17 | Stop reason: HOSPADM

## 2022-03-09 RX ORDER — GABAPENTIN 100 MG/1
100 CAPSULE ORAL EVERY 8 HOURS
Status: DISCONTINUED | OUTPATIENT
Start: 2022-03-09 | End: 2022-03-09 | Stop reason: SDUPTHER

## 2022-03-09 RX ORDER — PREGABALIN 75 MG/1
150 CAPSULE ORAL 3 TIMES DAILY
Status: DISCONTINUED | OUTPATIENT
Start: 2022-03-09 | End: 2022-03-17 | Stop reason: HOSPADM

## 2022-03-09 RX ORDER — PRAZOSIN HYDROCHLORIDE 2 MG/1
2 CAPSULE ORAL
Status: DISCONTINUED | OUTPATIENT
Start: 2022-03-09 | End: 2022-03-17 | Stop reason: HOSPADM

## 2022-03-09 RX ORDER — LITHIUM CARBONATE 300 MG/1
300 TABLET, FILM COATED, EXTENDED RELEASE ORAL
Status: DISCONTINUED | OUTPATIENT
Start: 2022-03-09 | End: 2022-03-17 | Stop reason: HOSPADM

## 2022-03-09 RX ORDER — MIRTAZAPINE 15 MG/1
7.5 TABLET, FILM COATED ORAL
Status: DISCONTINUED | OUTPATIENT
Start: 2022-03-09 | End: 2022-03-15

## 2022-03-09 RX ORDER — PROMETHAZINE HYDROCHLORIDE 25 MG/ML
25 INJECTION, SOLUTION INTRAMUSCULAR; INTRAVENOUS EVERY 6 HOURS PRN
Status: DISCONTINUED | OUTPATIENT
Start: 2022-03-09 | End: 2022-03-15

## 2022-03-09 RX ORDER — ROPIVACAINE HYDROCHLORIDE 5 MG/ML
INJECTION, SOLUTION EPIDURAL; INFILTRATION; PERINEURAL
Status: COMPLETED | OUTPATIENT
Start: 2022-03-09 | End: 2022-03-09

## 2022-03-09 RX ORDER — PROPOFOL 10 MG/ML
INJECTION, EMULSION INTRAVENOUS AS NEEDED
Status: DISCONTINUED | OUTPATIENT
Start: 2022-03-09 | End: 2022-03-09

## 2022-03-09 RX ORDER — ASPIRIN 81 MG/1
81 TABLET, CHEWABLE ORAL DAILY
Status: DISCONTINUED | OUTPATIENT
Start: 2022-03-10 | End: 2022-03-17 | Stop reason: HOSPADM

## 2022-03-09 RX ORDER — HYDROMORPHONE HCL/PF 1 MG/ML
SYRINGE (ML) INJECTION AS NEEDED
Status: DISCONTINUED | OUTPATIENT
Start: 2022-03-09 | End: 2022-03-09

## 2022-03-09 RX ORDER — ASCORBIC ACID 500 MG
500 TABLET ORAL DAILY
Status: DISCONTINUED | OUTPATIENT
Start: 2022-03-10 | End: 2022-03-17 | Stop reason: HOSPADM

## 2022-03-09 RX ORDER — MIDAZOLAM HYDROCHLORIDE 2 MG/2ML
INJECTION, SOLUTION INTRAMUSCULAR; INTRAVENOUS AS NEEDED
Status: DISCONTINUED | OUTPATIENT
Start: 2022-03-09 | End: 2022-03-09

## 2022-03-09 RX ORDER — HYDROMORPHONE HCL/PF 1 MG/ML
0.5 SYRINGE (ML) INJECTION EVERY 4 HOURS PRN
Status: DISCONTINUED | OUTPATIENT
Start: 2022-03-09 | End: 2022-03-15

## 2022-03-09 RX ORDER — FENTANYL CITRATE/PF 50 MCG/ML
25 SYRINGE (ML) INJECTION
Status: CANCELLED | OUTPATIENT
Start: 2022-03-09

## 2022-03-09 RX ORDER — SODIUM CHLORIDE, SODIUM LACTATE, POTASSIUM CHLORIDE, CALCIUM CHLORIDE 600; 310; 30; 20 MG/100ML; MG/100ML; MG/100ML; MG/100ML
100 INJECTION, SOLUTION INTRAVENOUS CONTINUOUS
Status: DISCONTINUED | OUTPATIENT
Start: 2022-03-09 | End: 2022-03-11

## 2022-03-09 RX ADMIN — FENTANYL CITRATE 50 MCG: 50 INJECTION INTRAMUSCULAR; INTRAVENOUS at 11:52

## 2022-03-09 RX ADMIN — SODIUM CHLORIDE 0.5 MCG/KG/HR: 9 INJECTION, SOLUTION INTRAVENOUS at 11:55

## 2022-03-09 RX ADMIN — DEXAMETHASONE SODIUM PHOSPHATE 4 MG: 10 INJECTION INTRAMUSCULAR; INTRAVENOUS at 11:40

## 2022-03-09 RX ADMIN — PROPOFOL 200 MG: 10 INJECTION, EMULSION INTRAVENOUS at 11:32

## 2022-03-09 RX ADMIN — PREGABALIN 150 MG: 75 CAPSULE ORAL at 21:13

## 2022-03-09 RX ADMIN — ACETAMINOPHEN 975 MG: 325 TABLET, FILM COATED ORAL at 10:16

## 2022-03-09 RX ADMIN — SODIUM CHLORIDE, SODIUM LACTATE, POTASSIUM CHLORIDE, AND CALCIUM CHLORIDE 100 ML/HR: .6; .31; .03; .02 INJECTION, SOLUTION INTRAVENOUS at 17:39

## 2022-03-09 RX ADMIN — ROCURONIUM BROMIDE 30 MG: 50 INJECTION, SOLUTION INTRAVENOUS at 12:27

## 2022-03-09 RX ADMIN — PRAZOSIN HYDROCHLORIDE 2 MG: 2 CAPSULE ORAL at 21:14

## 2022-03-09 RX ADMIN — HYDROMORPHONE HYDROCHLORIDE 0.5 MG: 1 INJECTION, SOLUTION INTRAMUSCULAR; INTRAVENOUS; SUBCUTANEOUS at 15:25

## 2022-03-09 RX ADMIN — SODIUM CHLORIDE, SODIUM LACTATE, POTASSIUM CHLORIDE, AND CALCIUM CHLORIDE: .6; .31; .03; .02 INJECTION, SOLUTION INTRAVENOUS at 12:40

## 2022-03-09 RX ADMIN — FENTANYL CITRATE 100 MCG: 50 INJECTION INTRAMUSCULAR; INTRAVENOUS at 10:58

## 2022-03-09 RX ADMIN — ATORVASTATIN CALCIUM 40 MG: 40 TABLET, FILM COATED ORAL at 18:13

## 2022-03-09 RX ADMIN — DOCUSATE SODIUM 100 MG: 100 CAPSULE, LIQUID FILLED ORAL at 18:13

## 2022-03-09 RX ADMIN — HYDROMORPHONE HYDROCHLORIDE 0.5 MG: 1 INJECTION, SOLUTION INTRAMUSCULAR; INTRAVENOUS; SUBCUTANEOUS at 12:16

## 2022-03-09 RX ADMIN — GLYCOPYRROLATE 0.6 MG: 0.2 INJECTION, SOLUTION INTRAMUSCULAR; INTRAVENOUS at 14:09

## 2022-03-09 RX ADMIN — FENTANYL CITRATE 50 MCG: 50 INJECTION INTRAMUSCULAR; INTRAVENOUS at 12:08

## 2022-03-09 RX ADMIN — OXYCODONE HYDROCHLORIDE 10 MG: 10 TABLET ORAL at 21:20

## 2022-03-09 RX ADMIN — DEXAMETHASONE SODIUM PHOSPHATE 4 MG: 10 INJECTION INTRAMUSCULAR; INTRAVENOUS at 14:12

## 2022-03-09 RX ADMIN — NEOSTIGMINE METHYLSULFATE 3 MG: 1 INJECTION INTRAVENOUS at 14:09

## 2022-03-09 RX ADMIN — SODIUM CHLORIDE, SODIUM LACTATE, POTASSIUM CHLORIDE, AND CALCIUM CHLORIDE 125 ML/HR: .6; .31; .03; .02 INJECTION, SOLUTION INTRAVENOUS at 10:33

## 2022-03-09 RX ADMIN — LITHIUM CARBONATE 300 MG: 300 TABLET, FILM COATED, EXTENDED RELEASE ORAL at 21:14

## 2022-03-09 RX ADMIN — MIRTAZAPINE 7.5 MG: 15 TABLET, FILM COATED ORAL at 21:19

## 2022-03-09 RX ADMIN — ROPIVACAINE HYDROCHLORIDE 30 ML: 5 INJECTION, SOLUTION EPIDURAL; INFILTRATION; PERINEURAL at 11:02

## 2022-03-09 RX ADMIN — SUGAMMADEX 100 MG: 100 INJECTION, SOLUTION INTRAVENOUS at 14:29

## 2022-03-09 RX ADMIN — CHLORHEXIDINE GLUCONATE 0.12% ORAL RINSE 15 ML: 1.2 LIQUID ORAL at 10:16

## 2022-03-09 RX ADMIN — LIDOCAINE HYDROCHLORIDE 80 MG: 10 INJECTION, SOLUTION EPIDURAL; INFILTRATION; INTRACAUDAL; PERINEURAL at 11:32

## 2022-03-09 RX ADMIN — ROCURONIUM BROMIDE 50 MG: 50 INJECTION, SOLUTION INTRAVENOUS at 11:32

## 2022-03-09 RX ADMIN — HYDROMORPHONE HYDROCHLORIDE 0.5 MG: 1 INJECTION, SOLUTION INTRAMUSCULAR; INTRAVENOUS; SUBCUTANEOUS at 19:38

## 2022-03-09 RX ADMIN — SENNOSIDES 8.6 MG: 8.6 TABLET ORAL at 21:13

## 2022-03-09 RX ADMIN — ACETAMINOPHEN 325MG 975 MG: 325 TABLET ORAL at 18:12

## 2022-03-09 RX ADMIN — CEFAZOLIN SODIUM 1000 MG: 1 SOLUTION INTRAVENOUS at 19:39

## 2022-03-09 RX ADMIN — FENTANYL CITRATE 25 MCG: 50 INJECTION INTRAMUSCULAR; INTRAVENOUS at 15:14

## 2022-03-09 RX ADMIN — CEFAZOLIN SODIUM 2000 MG: 2 SOLUTION INTRAVENOUS at 11:30

## 2022-03-09 RX ADMIN — FENTANYL CITRATE 25 MCG: 50 INJECTION INTRAMUSCULAR; INTRAVENOUS at 15:06

## 2022-03-09 RX ADMIN — HYDROMORPHONE HYDROCHLORIDE 0.5 MG: 1 INJECTION, SOLUTION INTRAMUSCULAR; INTRAVENOUS; SUBCUTANEOUS at 13:54

## 2022-03-09 RX ADMIN — TRANEXAMIC ACID 1 G: 1 INJECTION, SOLUTION INTRAVENOUS at 11:40

## 2022-03-09 RX ADMIN — BUSPIRONE HYDROCHLORIDE 20 MG: 10 TABLET ORAL at 21:13

## 2022-03-09 RX ADMIN — FENTANYL CITRATE 25 MCG: 50 INJECTION INTRAMUSCULAR; INTRAVENOUS at 15:20

## 2022-03-09 RX ADMIN — MIDAZOLAM 2 MG: 1 INJECTION INTRAMUSCULAR; INTRAVENOUS at 10:58

## 2022-03-09 RX ADMIN — QUETIAPINE FUMARATE 50 MG: 25 TABLET ORAL at 21:13

## 2022-03-09 RX ADMIN — IRON SUCROSE 300 MG: 20 INJECTION, SOLUTION INTRAVENOUS at 20:48

## 2022-03-09 NOTE — ANESTHESIA POSTPROCEDURE EVALUATION
Post-Op Assessment Note    CV Status:  Stable    Pain management: adequate     Mental Status:  Alert and awake   Hydration Status:  Euvolemic   PONV Controlled:  Controlled   Airway Patency:  Patent      Post Op Vitals Reviewed: Yes      Staff: Anesthesiologist         No complications documented    /93 (BP Location: Left arm)   Pulse 66   Temp (!) 96 8 °F (36 °C) (Tympanic)   Resp 15   Ht 5' (1 524 m)   Wt 81 2 kg (179 lb)   SpO2 100%   BMI 34 96 kg/m²

## 2022-03-09 NOTE — PHYSICAL THERAPY NOTE
PHYSICAL THERAPY EVALUATION & [address] [address] NOTE          Patient Name: Rosy POSADA Date: 3/9/2022   PT EVALUATION [id number]    60 y o     [id number]    Primary osteoarthritis of right knee [M17 11]    Past Medical History:   Diagnosis Date    Acid reflux     Anxiety     RESOLVED: [id number]    Arthritis     Bipolar 2 disorder (HCC)     FOLLOWS WITH PSYCHIATRIST  CONTINUE LAMOTRIGINE; RESOLVED:     Depression     Familial tremor     both hands    Fibromyalgia     LAST ASSESSED: [id number]    GERD (gastroesophageal reflux disease)     Hearing aid worn     left ear    [place] (hard of hearing)     left ear    Hyperlipidemia     Hypertension     Left-sided weakness     Lower back pain     Memory loss of unknown cause     long and short term    Migraine     Obesity     Obesity, Class II, BMI 35-39 9     Overactive bladder     Panic attack     Post traumatic stress disorder     Seasonal allergies     Stroke [facility])     questionable stroke 2009    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    CONT ASA 81 MG DAILY; RESOLVED: [id number]    Urinary incontinence     Wears dentures     partial lower / full upper    Wears glasses      Past Surgical History:   Procedure Laterality Date    BACK SURGERY       SECTION      COLONOSCOPY      RESOLVED: [id number]    EAR SURGERY      EGD      HYSTERECTOMY  2004    MYRINGOTOMY W/ TUBES Left     NECK SURGERY  2019    RI CYSTOURETHROSCOPY N/A 2016    Procedure: CYSTOSCOPY, BOTOX INJECTION;  Surgeon: Orie Sicard, MD;  Location: [facility];  Service: Gynecology    RI IMPLANT SPINAL NEUROSTIM/ Right 2/10/2021    Procedure: REPLACEMENT IMPLANTABLE PULSE GENERATOR DORSAL SPINAL COLUMN STIMULATOR, RIGHT;  Surgeon: Sissy Shaw MD;  Location:  MAIN OR;  Service: Neurosurgery    RI PERCUT IMPLNT NEUROELECT,EPIDURAL Right 2020    Procedure: INSERTION THORACIC DORSAL COLUMN SPINAL CORD STIMULATOR PERCUTANEOUS W IMPLANTABLE PULSE GENERATOR, RIGHT;  Surgeon: Amber Gimenez MD;  Location:  MAIN OR;  Service: Neurosurgery    [address] [facility]    UPPER GASTROINTESTINAL ENDOSCOPY  09/2020 03/09/22 1646   PT Last Visit   PT Visit Date 03/09/22   Note Type   Note type Evaluation  ([id number])   Additional Comments and tx note   Pain Assessment   Pain Assessment Tool FLACC   [facility] Pain Intervention(s) Cold applied;Repositioned; Ambulation/increased activity; Emotional support; Rest   Pain Rating: FLACC (Rest) - Face 1   Pain Rating: FLACC (Rest) - Legs 1   Pain Rating: FLACC (Rest) - Activity 0   Pain Rating: FLACC (Rest) - Cry 0   Pain Rating: FLACC (Rest) - Consolability 0   Score: FLACC (Rest) 2   Pain Rating: FLACC (Activity) - Face 1   Pain Rating: FLACC (Activity) - Legs 1   Pain Rating: FLACC (Activity) - Activity 1   Pain Rating: FLACC (Activity) - Cry 1   Pain Rating: FLACC (Activity) - Consolability 1   Score: FLACC (Activity) 5   Restrictions/Precautions   Weight Bearing Precautions Per Order Yes   RLE Weight Bearing Per Order WBAT   Other Precautions Cognitive; Chair Alarm; Bed Alarm;Multiple lines; Fall Risk;Pain;O2  (1 5L, catheter, IV, SUSAN hemovac drain)   Home Living   Type of [address] [place] [address] One level;Stairs to enter with rails   [address] (Comment)  (Rollator)   Additional Comments obtained via chart review  per OP note from 11/10/21, pt lives in second floot apt w 10-12 SKY   Prior Function   Lives With Daughter   Chet Uribe in the last 6 months 1 to 4   Comments pt unable to report  obtained via chart review  per chart pt mobilizes w Rollator vs wc in home  crawls up/down steps to get in to apt?   unclear if dtr home with pt or working  -   General   Additional Pertinent History pt admitted 3/9/22 for R TKR  had pre PT appt 3/7/22   pmhx significant for anxiety, arthritis, bipolar disorder, depression, tremors, FM, memory loss/ cognitive impairment, obesity, panic attacks, PTSD, stroke, urinary incontinence, tardive dyskinesia, sacroilitis, spinal cord stimulator  Cognition   Overall Cognitive Status Impaired   Arousal/Participation Responsive   Orientation Level Oriented to person;Oriented to place; Disoriented to time;Disoriented to situation  (general to place- hospital)   Memory Unable to assess   Following Commands Follows one step commands with increased time or repetition   Comments pt not appropriately answering questions at time of evaluation  does not appear to be baseline for patient however unable to confirm  primarily w non sensical speech, perseverates, requires frequent cuing to redirect attention  inconsistently answers  frequently closes eyes but opens them to auditory stimuli  Subjective   Subjective "It hurts more than the other one"   RLE Assessment   RLE Assessment X  (pain limited; difficult to assess 2* cognition)   LLE Assessment   LLE Assessment WFL  (difficult to assess 2* cognition)   Coordination   Movements are Fluid and Coordinated 0   Coordination and Movement Description hx tardive dyskinesa  constant repetitive tongue protrusion  no tremor observed   Sensation   (unable to assess 2* cognition)   Bed Mobility   Supine to Sit 3  Moderate assistance   Additional items Assist x 1;HOB elevated; Bedrails; Increased time required;Verbal cues;LE management; Other   Transfers   Sit to Stand 4  Minimal assistance   Additional items Assist x 1; Increased time required; Other;Verbal cues  (RW)   Stand to Sit 4  Minimal assistance   Additional items Assist x 1; Increased time required;Verbal cues; Other  (RW)   Additional Comments cues for position of RLE for comfort, hand placement   Ambulation/Elevation   Gait pattern Poor UE support;R Knee Analilia; Improper Weight shift; Antalgic;Decreased foot clearance;Decreased R stance; Short stride; Excessively slow   Gait Assistance 4  Minimal assist Additional items Assist x 1;Verbal cues; Tactile cues  (tactile cues to block knees  verbal cues for sequencing w RW)   Assistive Device Rolling walker   Distance 1' side step   Balance   Dynamic Sitting Fair -   Static Standing Poor +   Dynamic Standing Poor   Ambulatory Poor   Endurance Deficit   Endurance Deficit No  (vitals 136/86, 68 supine; 143/98, 74 EOB)   Activity Tolerance   Activity Tolerance Patient limited by pain;Treatment limited secondary to medical complications (Comment)  (cognition)   Nurse Made Aware [place] S[place]   Assessment   Prognosis Fair   Problem List Decreased strength; Impaired balance;Decreased mobility; Decreased cognition; Impaired judgement;Decreased safety awareness; Obesity; Decreased skin integrity;Pain   Assessment Samanhta Garza is a 60 y o  female admitted to [facility] on 3/9/2022 for <principal problem not specified>  POD#0 R TKR  PT was consulted and pt was seen on 3/9/2022 for mobility assessment and d/c planning  Pt presents w multiple lines, high fall risk, pain of R knee via FLACC scale  Unable to obtain accurate social hx or PLOF at this time secondary to significant communication barrier at time of evaluation  Brief hx obtained via chart review  Unclear baseline cognition however deficits at time of evaluation significantly impacting participation in therapy and including impaired communication /speech, attention, command following, safety awareness, memory, orientation, comprehension  Pt is currently functioning at a mod A for bed mobility, min A for transfers and min A for ambulation w RW  Pt demonstrated increased pain w WB on RLE in standing, w initial knee buckling and decreased tolerance to WB resulting in TTWB  [place]elrance to WB improved w time  Pt able to WB to side step however require tactile input to block R knee  Unable to perform mobility tasks without physical assistance, constant cuing for technique   Unable to ambulate household distances and at significant risk for falls given prev mentioned factors of pain, weakness and cognition  Pt will benefit from continued skilled IP PT to address the above mentioned impairments  in order to maximize recovery and increase functional independence when completing mobility and ADLs  At this time PT recommendations for d/c are STR  Barriers to Discharge Inaccessible home environment;Decreased caregiver support   Barriers to Discharge Comments zoe? level of supervision at home? Goals   Patient Goals none offered 2* cognition   STG Expiration Date 03/19/22   Short Term Goal #1 1)  Pt will perform bed mobility with S demonstrating appropriate technique 100% of the time in order to improve function  2)  Perform all transfers with S demonstrating safe and appropriate technique 100% of the time in order to improve ability to negotiate safely in home environment  3) Amb with least restrictive AD > 50'x1 with S in order to demonstrate ability to negotiate in home environment  4)  Improve overall strength and balance 1/2 grade in order to optimize ability to perform functional tasks and reduce fall risk  5) Increase activity tolerance to 45 minutes in order to improve endurance to functional tasks  6)  Negotiate stairs using most appropriate technique and min A in order to be able to negotiate safely in home environment  7) PT for ongoing patient and family/caregiver education, DME needs and d/c planning in order to promote highest level of function in least restrictive environment  PT Treatment Day 1   Plan   Treatment/Interventions Functional transfer training;LE strengthening/ROM; Elevations; Therapeutic exercise;Cognitive reorientation;Patient/family training;Equipment eval/education; Bed mobility;Gait training; Compensatory technique education;Continued evaluation;Spoke to nursing   PT Frequency Twice a day   Recommendation   PT Discharge Recommendation Post acute rehabilitation services   Additional Comments The patient's AM-PAC Basic Mobility Inpatient Short Form Raw Score is 11  A Raw score of less than or equal to 16 suggests the patient may benefit from discharge to post-acute rehabilitation services  Please also refer to the recommendation of the Physical Therapist for safe discharge planning  AM-PAC Basic Mobility Inpatient   Turning in Bed Without Bedrails 2   Lying on Back to Sitting on Edge of Flat Bed 2   Moving Bed to Chair 2   Standing Up From Chair 3   Walk in Room 1   Climb 3-5 Stairs 1   Basic Mobility Inpatient Raw Score 11   Basic Mobility Standardized Score 30 25   Highest Level Of Mobility   JH-HL Goal 4: Move to chair/commode   JH-HLM Highest Level of Mobility 4: Move to chair/commode   JH-HL Goal Achieved Yes   Additional Treatment Session   Start Time 1646   End Time 1656   Treatment Assessment Pt agreeable to additional trial to stand and ambulate  Min A to transf sit<>stand from EOB  Min A to ambulate additional 1' w RW and tactile input to block R knee  Continues to be limited by pain w RLE WB, weakenss, and cognition  Min A to return EOB>supine w assist to lift RLE and cues for technique  BP end of session in supine 126/89m HR 72  WOuld continue to benefit from STR given ?social barriers, environmental barriers, high fall risk adn decline in fxn  Equipment Use RW   Additional Treatment Day 1   End of Consult   Patient Position at End of Consult Supine;Bed/Chair alarm activated; All needs within reach   History: co - morbidities, social background, fall risk, use of assistive device, ?assist for adl's, cognition, multiple lines  Exam: impairments in systems including musculoskeletal (strength), neuromuscular (balance, gait, transfers, motor function, coordination), am-pac, cardiopulmonary, cognition  Clinical: unstable/unpredictable  Complexity:high      Kiarra Cloud, PT

## 2022-03-09 NOTE — PLAN OF CARE
Problem: PHYSICAL THERAPY ADULT  Goal: Performs mobility at highest level of function for planned discharge setting  See evaluation for individualized goals  Description: Treatment/Interventions: Functional transfer training,LE strengthening/ROM,Elevations,Therapeutic exercise,Cognitive reorientation,Patient/family training,Equipment eval/education,Bed mobility,Gait training,Compensatory technique education,Continued evaluation,Spoke to nursing          See flowsheet documentation for full assessment, interventions and recommendations  3/9/2022 1730 by Ria Wallis, PT  Note: Prognosis: Fair  Problem List: Decreased strength,Impaired balance,Decreased mobility,Decreased cognition,Impaired judgement,Decreased safety awareness,Obesity,Decreased skin integrity,Pain  Assessment: Samantha Romo is a 60 y o  female admitted to [facility] on 3/9/2022 for <principal problem not specified>  POD#0 R TKR  PT was consulted and pt was seen on 3/9/2022 for mobility assessment and d/c planning  Pt presents w multiple lines, high fall risk, pain of R knee via FLACC scale  Unable to obtain accurate social hx or PLOF at this time secondary to significant communication barrier at time of evaluation  Brief hx obtained via chart review  Unclear baseline cognition however deficits at time of evaluation significantly impacting participation in therapy and including impaired communication /speech, attention, command following, safety awareness, memory, orientation, comprehension  Pt is currently functioning at a mod A for bed mobility, min A for transfers and min A for ambulation w RW  Pt demonstrated increased pain w WB on RLE in standing, w initial knee buckling and decreased tolerance to WB resulting in TTWB  Toelrance to WB improved w time  Pt able to WB to side step however require tactile input to block R knee  Unable to perform mobility tasks without physical assistance, constant cuing for technique   Unable to ambulate household distances and at significant risk for falls given prev mentioned factors of pain, weakness and cognition  Pt will benefit from continued skilled IP PT to address the above mentioned impairments  in order to maximize recovery and increase functional independence when completing mobility and ADLs  At this time PT recommendations for d/c are STR  Barriers to Discharge: Inaccessible home environment,Decreased caregiver support  Barriers to Discharge Comments: zoe? level of supervision at home? PT Discharge Recommendation: Post acute rehabilitation services          See flowsheet documentation for full assessment  3/9/2022 1730 by Josephine Moise, PT  Note: Prognosis: Fair  Problem List: Decreased strength,Impaired balance,Decreased mobility,Decreased cognition,Impaired judgement,Decreased safety awareness,Obesity,Decreased skin integrity,Pain  Assessment: Samantha Sierra is a 60 y o  female admitted to [facility] on 3/9/2022 for <principal problem not specified>  POD#0 R TKR  PT was consulted and pt was seen on 3/9/2022 for mobility assessment and d/c planning  Pt presents w multiple lines, high fall risk, pain of R knee via FLACC scale  Unable to obtain accurate social hx or PLOF at this time secondary to significant communication barrier at time of evaluation  Brief hx obtained via chart review  Unclear baseline cognition however deficits at time of evaluation significantly impacting participation in therapy and including impaired communication /speech, attention, command following, safety awareness, memory, orientation, comprehension  Pt is currently functioning at a mod A for bed mobility, min A for transfers and min A for ambulation w RW  Pt demonstrated increased pain w WB on RLE in standing, w initial knee buckling and decreased tolerance to WB resulting in TTWB  Toelrance to WB improved w time  Pt able to WB to side step however require tactile input to block R knee   Unable to perform mobility tasks without physical assistance, constant cuing for technique  Unable to ambulate household distances and at significant risk for falls given prev mentioned factors of pain, weakness and cognition  Pt will benefit from continued skilled IP PT to address the above mentioned impairments  in order to maximize recovery and increase functional independence when completing mobility and ADLs  At this time PT recommendations for d/c are STR  Barriers to Discharge: Inaccessible home environment,Decreased caregiver support  Barriers to Discharge Comments: zoe? level of supervision at home? PT Discharge Recommendation: Post acute rehabilitation services          See flowsheet documentation for full assessment

## 2022-03-09 NOTE — ANESTHESIA PREPROCEDURE EVALUATION
Procedure:  ARTHROPLASTY KNEE TOTAL (Right Knee)    Relevant Problems   CARDIO   (+) Essential hypertension   (+) Migraine   (+) Mixed hyperlipidemia      GI/HEPATIC   (+) Dysphagia   (+) Esophageal reflux      MUSCULOSKELETAL   (+) Arthritis of right knee   (+) Chronic back pain   (+) Fibromyalgia   (+) Intractable low back pain   (+) Lumbar spondylosis   (+) Osteoarthritis of both hips   (+) Osteoarthritis of knee   (+) Primary localized osteoarthritis of both knees   (+) Primary osteoarthritis of both knees   (+) Sacroiliitis (HCC)      NEURO/PSYCH   (+) CVA (cerebral vascular accident) ([address] )   (+) Chronic back pain   (+) Chronic pain disorder   (+) Fibromyalgia   (+) Generalized anxiety disorder   (+) History of CVA (cerebrovascular accident)   (+) History of hypokalemia   (+) Intractable low back pain   (+) Major depressive disorder, recurrent episode, moderate degree (HCC)   (+) Migraine   (+) Panic disorder without agoraphobia   (+) Post traumatic stress disorder      PULMONARY   (+) MIMI (obstructive sleep apnea)      Other   (+) Bipolar II disorder (HCC)   (+) Cognitive disorder   (+) Opioid dependence (HCC)   (+) Tardive dyskinesia        Physical Exam    Airway    Mallampati score: II  TM Distance: >3 FB  Neck ROM: full     Dental   lower dentures and upper dentures,     Cardiovascular  Rhythm: regular, Rate: normal, Cardiovascular exam normal    Pulmonary      Other Findings        Anesthesia Plan  ASA Score- 3     Anesthesia Type- general with ASA Monitors  Additional Monitors:   Airway Plan: ETT  Comment: Adductor canal block , after sedation, preop discussed          Plan Factors-    Chart reviewed  Existing labs reviewed  Patient summary reviewed  Patient is not a current smoker  Patient instructed to abstain from smoking on day of procedure  Patient did not smoke on day of surgery  Induction- intravenous  Postoperative Plan- Plan for postoperative opioid use   Planned trial extubation    Informed Consent- Anesthetic plan and risks discussed with patient

## 2022-03-09 NOTE — OP NOTE
OPERATIVE REPORT  PATIENT NAME: Shandra Long    :  1965  MRN: [id number]  Pt Location: [facility] ROOM 01    SURGERY DATE: 3/9/2022    Surgeon(s) and Role:     * Caitlyn Lerner, DO - Primary     * Sha Sampson PA-C - Assisting    Preop Diagnosis:  Primary osteoarthritis of right knee [M17 11]    Post-Op Diagnosis Codes:     * Primary osteoarthritis of right knee [M17 11]    Procedure(s) (LRB):  ARTHROPLASTY KNEE TOTAL (Right)    Specimen(s):  * No specimens in log *    Estimated Blood Loss:   Minimal    Drains:  Closed/Suction Drain Anterior;Right Knee Accordion 10 Fr  (Active)   Number of days: 0       Urethral Catheter Non-latex 16 Fr  (Active)   Number of days: 0       Anesthesia Type:   Spinal    Operative Indications:  Primary osteoarthritis of right knee [M17 11]      Operative Findings:  See below    Complications:   None    Procedure and Technique:  Implants:  Depuy Attune  CR femoral component size 3  Tibial rotating platform baseplate size 4  Tibial insert rotating platform CR size 3, 7mm  Dome patella size 35mm    INDICATIONS FOR PROCEDURE:  The patients is a 60 y o  female who presented to the office with  knee OA  Conservative treatment was attempted for quite some time and ultimately failed  She has severe progressive pain, stiffness, and disability and now elects to proceed with right total knee replacement surgery  Extensive counseling in regards to the reasons for surgical intervention as well as the risks and benefits of surgery were reviewed  The risks include, but are not limited to infection, extensive blood loss, blood clots, wound healing problems, fracture, need for additional surgery, need for revision surgery, failure of hardware, persistent pain and stiffness, heart attack, stroke, death  The patient understood and agreed to by oral and written consent      OPERATIVE PROCEDURE:  The patient was identified as Samantha Rodriguez by Her ID bracelet by the surgical staff in the preoperative area at [facility]   The patient was wheeled back to the surgical room and placed on the operative table  Anesthesia was administered  Preoperative antibiotics were given  The patient remained in the supine position and all bony prominences were carefully protected  A tourniquet was applied to the patients right upper thigh  The right leg was then prepped and draped in the usual sterile fashion  A timeout was performed where the patients name and surgical site were once again identified  The incision was marked out  The leg was elevated and the tourniquet was inflated to 300 mmHg  An incision was made over anterior aspect of the knee  Dissection was made through the skin and subcutaneous tissue  A medial parapatellar arthrotomy was made and the medial tissues were elevated while protecting the MCL  Remnants of the ACL, medial and lateral menisci were removed and retractors were placed  Severe osteoarthritis was noted  The femoral canal was opened with a drill and the contents were suctioned and the canal was irrigated  We used an intramedullary guide on the femoral side and resected 9mm of distal femur in 5 degrees of valgus  Osteophytes were removed  We then subluxated the tibia forward and opened the tibial canal with a drill  The contents of the canal were then suctioned and the canal was irrigated  We placed an intramedullary guide and resected 6mm of bone based on the highest point of the medial tibial plateau perpendicular to the mechanical axis of the tibia  Next, the flexion and extension gaps were analyzed with a spacer block and visualization  Barnwells line and the epicondylar axis were then identified  The 4 in 1 cutting block was placed in 3 degrees of external rotation taking into account the wear on the posterolateral aspect of the femur and resections were made  Any remnants of the medial and lateral menisci were removed  Trials were then placed and the knee was felt to be stable and well balanced throughout the arc of motion  The patella was measured and 10mm of the articular portion was removed  The trial for the patella was placed and patellar tracking was checked and found to be adequate with the other components in place  Attention was directed back to the tibia  External rotation of the tibial guide was set and the final preparations of the tibia were performed  The components were removed and the knee was copiously irrigated with pulse lavage and then dried  The components were then cemented in place and excess cement was removed  Once the cement was dried the trial insert was trialed  A size 3, 7mm tibial insert was confirmed to be the correct size  The final polyethylene component was placed and the tourniquet was let down  Any bleeders were cauterized and then the knee was irrigated  An irrisept wash was performed and then the knee was once again copiously irrigated  Marcaine, morphine, and toradol was injected periarticularly throughout the case  The arthrotomy was closed with vicryl suture  The skin and subcutaneous tissues were closed with vicryl and then a running monocryl stitch  A sterile dressing was placed  The patient was awakened and transferred to a hospital bed and then to the recovery room in stable condition  I attest that I was present and performed this procedure  Turner Castaneda PA-C and Bryan Merlos, ATC were present for the entire procedure and provided essential assistance with limb position, patient prepping, and retraction  A qualified resident physician was not available      Patient Disposition:  hemodynamically stable      SIGNATURE: Yandy Berumen DO  DATE: March 9, 2022  TIME: 2:18 PM

## 2022-03-09 NOTE — ANESTHESIA PROCEDURE NOTES
Peripheral Block    Patient location during procedure: holding area  Start time: 3/9/2022 10:58 AM  Reason for block: at surgeon's request and post-op pain management  Staffing  Performed: Anesthesiologist   Anesthesiologist: Maicol Rome MD  Preanesthetic Checklist  Completed: patient identified, IV checked, site marked, risks and benefits discussed, surgical consent, monitors and equipment checked, pre-op evaluation and timeout performed  Peripheral Block  Patient position: supine  Prep: ChloraPrep  Patient monitoring: frequent blood pressure checks  Block type: adductor canal block  Laterality: right  Injection technique: single-shot  Procedures: ultrasound guided, Ultrasound guidance required for the procedure to increase accuracy and safety of medication placement and decrease risk of complications   and nerve stimulator  Ultrasound permanent image savedropivacaine (NAROPIN) 0 5 % perineural infiltration, 30 mL  Needle  Needle type: Stimuplex   Needle gauge: 21 G  Needle length: 10 cm  Needle localization: ultrasound guidance  Assessment  Injection assessment: incremental injection, local visualized surrounding nerve on ultrasound, negative aspiration for heme and no paresthesia on injection  Paresthesia pain: none  Heart rate change: no  Slow fractionated injection: yes  Post-procedure:  site cleaned  patient tolerated the procedure well with no immediate complications

## 2022-03-09 NOTE — INTERVAL H&P NOTE
H&P reviewed  After examining the patient I find no changes in the patients condition since the H&P had been written      Vitals:    03/09/22 1102   BP: 141/95   Pulse: 68   Resp: 18   Temp:    SpO2: 100%

## 2022-03-10 PROBLEM — Z96.651 S/P TOTAL KNEE ARTHROPLASTY, RIGHT: Status: ACTIVE | Noted: 2022-03-10

## 2022-03-10 LAB
ANION GAP SERPL CALCULATED.3IONS-SCNC: 7 MMOL/L (ref 4–13)
BUN SERPL-MCNC: 7 MG/DL (ref 5–25)
CALCIUM SERPL-MCNC: 8 MG/DL (ref 8.3–10.1)
CHLORIDE SERPL-SCNC: 105 MMOL/L (ref 100–108)
CO2 SERPL-SCNC: 26 MMOL/L (ref 21–32)
CREAT SERPL-MCNC: 0.7 MG/DL (ref 0.6–1.3)
ERYTHROCYTE [DISTWIDTH] IN BLOOD BY AUTOMATED COUNT: 13.7 % (ref 11.6–15.1)
GFR SERPL CREATININE-BSD FRML MDRD: 95 ML/MIN/1.73SQ M
GLUCOSE SERPL-MCNC: 110 MG/DL (ref 65–140)
HCT VFR BLD AUTO: 34.4 % (ref 34.8–46.1)
HGB BLD-MCNC: 11.6 G/DL (ref 11.5–15.4)
MCH RBC QN AUTO: 29.1 PG (ref 26.8–34.3)
MCHC RBC AUTO-ENTMCNC: 33.7 G/DL (ref 31.4–37.4)
MCV RBC AUTO: 86 FL (ref 82–98)
PLATELET # BLD AUTO: 312 THOUSANDS/UL (ref 149–390)
PMV BLD AUTO: 9.4 FL (ref 8.9–12.7)
POTASSIUM SERPL-SCNC: 4 MMOL/L (ref 3.5–5.3)
RBC # BLD AUTO: 3.99 MILLION/UL (ref 3.81–5.12)
SODIUM SERPL-SCNC: 138 MMOL/L (ref 136–145)
WBC # BLD AUTO: 10.11 THOUSAND/UL (ref 4.31–10.16)

## 2022-03-10 PROCEDURE — 85027 COMPLETE CBC AUTOMATED: CPT | Performed by: PHYSICIAN ASSISTANT

## 2022-03-10 PROCEDURE — 97110 THERAPEUTIC EXERCISES: CPT

## 2022-03-10 PROCEDURE — 99221 1ST HOSP IP/OBS SF/LOW 40: CPT | Performed by: STUDENT IN AN ORGANIZED HEALTH CARE EDUCATION/TRAINING PROGRAM

## 2022-03-10 PROCEDURE — 97167 OT EVAL HIGH COMPLEX 60 MIN: CPT

## 2022-03-10 PROCEDURE — 80048 BASIC METABOLIC PNL TOTAL CA: CPT | Performed by: PHYSICIAN ASSISTANT

## 2022-03-10 PROCEDURE — 97530 THERAPEUTIC ACTIVITIES: CPT

## 2022-03-10 PROCEDURE — 97116 GAIT TRAINING THERAPY: CPT

## 2022-03-10 RX ORDER — SENNOSIDES 8.6 MG
2 TABLET ORAL
Status: DISCONTINUED | OUTPATIENT
Start: 2022-03-10 | End: 2022-03-17 | Stop reason: HOSPADM

## 2022-03-10 RX ORDER — POLYETHYLENE GLYCOL 3350 17 G/17G
17 POWDER, FOR SOLUTION ORAL DAILY
Status: DISCONTINUED | OUTPATIENT
Start: 2022-03-10 | End: 2022-03-17 | Stop reason: HOSPADM

## 2022-03-10 RX ADMIN — DOCUSATE SODIUM 100 MG: 100 CAPSULE, LIQUID FILLED ORAL at 10:10

## 2022-03-10 RX ADMIN — ASPIRIN 81 MG CHEWABLE TABLET 81 MG: 81 TABLET CHEWABLE at 09:46

## 2022-03-10 RX ADMIN — ACETAMINOPHEN 325MG 975 MG: 325 TABLET ORAL at 21:20

## 2022-03-10 RX ADMIN — OXYCODONE HYDROCHLORIDE 10 MG: 10 TABLET ORAL at 06:03

## 2022-03-10 RX ADMIN — PRAZOSIN HYDROCHLORIDE 2 MG: 2 CAPSULE ORAL at 23:32

## 2022-03-10 RX ADMIN — OXYCODONE HYDROCHLORIDE AND ACETAMINOPHEN 500 MG: 500 TABLET ORAL at 09:46

## 2022-03-10 RX ADMIN — PREGABALIN 150 MG: 75 CAPSULE ORAL at 17:28

## 2022-03-10 RX ADMIN — BUSPIRONE HYDROCHLORIDE 20 MG: 10 TABLET ORAL at 21:21

## 2022-03-10 RX ADMIN — PAROXETINE 60 MG: 20 TABLET, FILM COATED ORAL at 09:46

## 2022-03-10 RX ADMIN — IRON SUCROSE 300 MG: 20 INJECTION, SOLUTION INTRAVENOUS at 21:38

## 2022-03-10 RX ADMIN — BUSPIRONE HYDROCHLORIDE 20 MG: 10 TABLET ORAL at 17:28

## 2022-03-10 RX ADMIN — PREGABALIN 150 MG: 75 CAPSULE ORAL at 21:21

## 2022-03-10 RX ADMIN — FOLIC ACID 1 MG: 1 TABLET ORAL at 09:46

## 2022-03-10 RX ADMIN — POLYETHYLENE GLYCOL 3350 17 G: 17 POWDER, FOR SOLUTION ORAL at 12:29

## 2022-03-10 RX ADMIN — BUSPIRONE HYDROCHLORIDE 20 MG: 10 TABLET ORAL at 09:46

## 2022-03-10 RX ADMIN — SENNOSIDES 17.2 MG: 8.6 TABLET ORAL at 21:21

## 2022-03-10 RX ADMIN — PREGABALIN 150 MG: 75 CAPSULE ORAL at 09:46

## 2022-03-10 RX ADMIN — OXYCODONE HYDROCHLORIDE 10 MG: 10 TABLET ORAL at 23:32

## 2022-03-10 RX ADMIN — PANTOPRAZOLE SODIUM 20 MG: 20 TABLET, DELAYED RELEASE ORAL at 06:03

## 2022-03-10 RX ADMIN — ENOXAPARIN SODIUM 40 MG: 40 INJECTION SUBCUTANEOUS at 03:26

## 2022-03-10 RX ADMIN — CEFAZOLIN SODIUM 1000 MG: 1 SOLUTION INTRAVENOUS at 03:27

## 2022-03-10 RX ADMIN — POTASSIUM CHLORIDE 20 MEQ: 1500 TABLET, EXTENDED RELEASE ORAL at 09:46

## 2022-03-10 RX ADMIN — LITHIUM CARBONATE 300 MG: 300 TABLET, FILM COATED, EXTENDED RELEASE ORAL at 23:31

## 2022-03-10 RX ADMIN — ATORVASTATIN CALCIUM 40 MG: 40 TABLET, FILM COATED ORAL at 17:28

## 2022-03-10 RX ADMIN — OXYCODONE HYDROCHLORIDE 5 MG: 5 TABLET ORAL at 13:49

## 2022-03-10 RX ADMIN — SODIUM CHLORIDE, SODIUM LACTATE, POTASSIUM CHLORIDE, AND CALCIUM CHLORIDE 100 ML/HR: .6; .31; .03; .02 INJECTION, SOLUTION INTRAVENOUS at 02:08

## 2022-03-10 RX ADMIN — ACETAMINOPHEN 325MG 975 MG: 325 TABLET ORAL at 06:03

## 2022-03-10 RX ADMIN — ACETAMINOPHEN 325MG 975 MG: 325 TABLET ORAL at 13:47

## 2022-03-10 RX ADMIN — QUETIAPINE FUMARATE 50 MG: 25 TABLET ORAL at 21:21

## 2022-03-10 RX ADMIN — HYDROMORPHONE HYDROCHLORIDE 0.5 MG: 1 INJECTION, SOLUTION INTRAMUSCULAR; INTRAVENOUS; SUBCUTANEOUS at 21:47

## 2022-03-10 NOTE — UTILIZATION REVIEW
Initial Clinical Review    Elective    OP    surgical procedure    03/10/22 1658  Inpatient Admission  Once        Transfer Service: Orthopedic Surgery       Question Answer Comment   Level of Care Med Surg    Estimated length of stay More than 2 Midnights    Certification I certify that inpatient services are medically necessary for this patient for a duration of greater than two midnights  See H&P and MD Progress Notes for additional information about the patient's course of treatment  03/10/22 1657   OP NC  BED   3/9 @   1453  CHANGED TO IP ADMISSION 3/10 @  1657  NEEDS  STR, CONTINUED PT  NOT MEETING PT GOALS        Age/Sex: 60 y o  female     Surgery Date:    3/9/22    Procedure: ARTHROPLASTY KNEE TOTAL (Right)       Anesthesia:    spinal        POD#1 Progress Note:   3/10   Continue post op care  Continue  Pain control/antiemetics   As needed  Continue  PT/OT, recommending  STR  Upon discharge  Continue to monitor labs  Hemoglobin  Now   11 6  Post op, from  15, receiving  IV  Venofer  Has multiple complaints of pain, abdominal, knee and lower back  Does have chronic pain disorder  3/10  IM consult  PMH  CVA, essential hypertension, tardive dyskinesia, anxiety, Bipolar, lumbar radiculopathy, chronic pain  and esophageal reflux  Continue home meds for  Co morbidities        Vital Signs: /65   Pulse 78   Temp (!) 96 2 °F (35 7 °C) (Temporal)   Resp 18   Ht 5' (1 524 m)   Wt 81 2 kg (179 lb)   SpO2 97%   BMI 34 96 kg/m²     Pertinent Labs/Diagnostic Test Results:   Results from last 7 days   Lab Units 03/09/22  0959 03/04/22  1021   SARS-COV-2  Negative Negative     Results from last 7 days   Lab Units 03/10/22  0515 03/05/22  0854   WBC Thousand/uL 10 11 3 70*   HEMOGLOBIN g/dL 11 6 14 3   HEMATOCRIT % 34 4* 42 9   PLATELETS Thousands/uL 312 371   NEUTROS ABS Thousands/µL  --  2 50         Results from last 7 days   Lab Units 03/10/22  0515 03/09/22  0951 03/05/22  0854   SODIUM mmol/L 138 142 142   POTASSIUM mmol/L 4 0 3 7 3 1*   CHLORIDE mmol/L 105 105 105   CO2 mmol/L 26 26 29   ANION GAP mmol/L 7 11 8   BUN mg/dL 7 5 6   CREATININE mg/dL 0 70 0 81 0 75   EGFR ml/min/1 73sq m 95 80 88   CALCIUM mg/dL 8 0* 8 7 8 6           Results from last 7 days   Lab Units 03/10/22  0515 03/09/22  0951   GLUCOSE RANDOM mg/dL 110 103                       Results from last 7 days   Lab Units 03/09/22  0959   INFLUENZA A PCR  Negative   INFLUENZA B PCR  Negative   RSV PCR  Negative             Diet:   regular    Mobility: PT/OT    DVT Prophylaxis:    SCD'S    Medications/Pain Control:   Scheduled Medications:  acetaminophen, 975 mg, Oral, Q8H [facility]  ascorbic acid, 500 mg, Oral, Daily  aspirin, 81 mg, Oral, Daily  atorvastatin, 40 mg, Oral, Daily With Dinner  busPIRone, 20 mg, Oral, TID  enoxaparin, 40 mg, Subcutaneous, Daily  folic acid, 1 mg, Oral, Daily  iron sucrose, 300 mg, Intravenous, Q24H  lithium carbonate, 300 mg, Oral, HS  pantoprazole, 20 mg, Oral, Early Morning  PARoxetine, 60 mg, Oral, Daily  polyethylene glycol, 17 g, Oral, Daily  potassium chloride, 20 mEq, Oral, Daily  prazosin, 2 mg, Oral, HS  pregabalin, 150 mg, Oral, TID  propranolol, 20 mg, Oral, BID before breakfast/lunch  QUEtiapine, 50 mg, Oral, HS  senna, 2 tablet, Oral, HS  Valbenazine Tosylate, 80 mg, Oral, Daily      Continuous IV Infusions:  lactated ringers, 100 mL/hr, Intravenous, Continuous      PRN Meds:  calcium carbonate, 1,000 mg, Oral, Daily PRN  HYDROmorphone, 0 5 mg, Intravenous, Q4H PRN   ( x1  3/9)    hydrOXYzine HCL, 50 mg, Oral, TID PRN  lactated ringers, 1,000 mL, Intravenous, Once PRN   And  lactated ringers, 1,000 mL, Intravenous, Once PRN  mirtazapine, 7 5 mg, Oral, HS PRN  oxyCODONE, 10 mg, Oral, Q4H PRN  oxyCODONE, 5 mg, Oral, Q4H PRN  promethazine, 25 mg, Intramuscular, Q6H PRN  sodium chloride, 1,000 mL, Intravenous, Once PRN   And  sodium chloride, 1,000 mL, Intravenous, Once PRN        Network Utilization Review Department  ATTENTION: Please call with any questions or concerns to [phone] and carefully listen to the prompts so that you are directed to the right person  All voicemails are confidential   Jose Rapp all requests for admission clinical reviews, approved or denied determinations and any other requests to dedicated fax number below belonging to the campus where the patient is receiving treatment   List of dedicated fax numbers for the Facilities:  [address] DENIALS (Administrative/Medical Necessity) [phone]   [address] (Maternity/NICU/Pediatrics) [phone]   [address]  [address] [facility] [place] [address] [id number] [place] [address] [address] [facility] [place] [address] [phone]

## 2022-03-10 NOTE — OCCUPATIONAL THERAPY NOTE
Occupational Therapy Evaluation(time=)     Patient Name: Zain Llamasf  HGUYY'O Date: 3/10/2022  Problem List  Active Problems:    CVA (cerebral vascular accident) [facility])    Past Medical History  Past Medical History:   Diagnosis Date    Acid reflux     Anxiety     RESOLVED: [id number]    Arthritis     Bipolar 2 disorder (HCC)     FOLLOWS WITH PSYCHIATRIST  CONTINUE LAMOTRIGINE; RESOLVED: [id number]    Depression     Familial tremor     both hands    Fibromyalgia     LAST ASSESSED: [id number]    GERD (gastroesophageal reflux disease)     Hearing aid worn     left ear    [place] (hard of hearing)     left ear    Hyperlipidemia     Hypertension     Left-sided weakness     Lower back pain     Memory loss of unknown cause     long and short term    Migraine     Obesity     Obesity, Class II, BMI 35-39 9     Overactive bladder     Panic attack     Post traumatic stress disorder     Seasonal allergies     Stroke [facility])     questionable stroke 2009    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    CONT ASA 81 MG DAILY; RESOLVED: [id number]    Urinary incontinence     Wears dentures     partial lower / full upper    Wears glasses      Past Surgical History  Past Surgical History:   Procedure Laterality Date    BACK SURGERY       SECTION      COLONOSCOPY      RESOLVED: [id number]    EAR SURGERY      EGD      HYSTERECTOMY  2004    MYRINGOTOMY W/ TUBES Left     NECK SURGERY  2019    ND CYSTOURETHROSCOPY N/A 2016    Procedure: CYSTOSCOPY, BOTOX INJECTION;  Surgeon: Rich Serna MD;  [facility];  Service: Gynecology    ND IMPLANT SPINAL NEUROSTIM/ Right 2/10/2021    Procedure: REPLACEMENT IMPLANTABLE PULSE GENERATOR DORSAL SPINAL COLUMN STIMULATOR, RIGHT;  Surgeon: Mini Velasquez MD;  [facility];  Service: Neurosurgery    ND PERCUT IMPLNT NEUROELECT,EPIDURAL Right 2020    Procedure: INSERTION THORACIC DORSAL COLUMN SPINAL CORD STIMULATOR PERCUTANEOUS W IMPLANTABLE PULSE GENERATOR, RIGHT;  Surgeon: Monik Chapman MD;  [facility];  Service: Neurosurgery    [place] TOTAL KNEE ARTHROPLASTY Right 3/9/2022    Procedure: ARTHROPLASTY KNEE TOTAL;  Surgeon: Fawad Waterman DO;  Location: [facility];  Service: Orthopedics    TONSILLECTOMY     [address] [place]    UPPER GASTROINTESTINAL ENDOSCOPY  09/2020             03/10/22 0952   Note Type   Note type Evaluation   Restrictions/Precautions   Weight Bearing Precautions Per Order Yes   RLE Weight Bearing Per Order WBAT   Other Precautions Chair Alarm; Bed Alarm; Fall Risk;Pain   Pain Assessment   Pain Assessment Tool 0-10   Pain Score 8   Pain Location/Orientation Orientation: Right;Location: Knee   Home Living   Type of Home House   Home Layout Multi-level; Able to live on main level with bedroom/bathroom  (7 zoe with railing)   Bathroom Shower/Tub Tub/shower unit   Bathroom Toilet Standard   Home Equipment   (rollator)   Prior Function   Lives With Daughter;Family  (CLARENCE, 2 grandchildren-3&4y/o)   ADL Assistance Needs assistance   Falls in the last 6 months 1 to 4  (3)   Comments PTA pt states that she had ocassional assistance with her ADLs, transfers, ambulation--with rollator, household distances; +falls=3, neg home alone, neg    Lifestyle   Autonomy PTA pt states that she had ocassional assistance with her ADLs, transfers, ambulation--with rollator, household distances; +falls=3, neg home alone, neg ; has HHA(CLARENCE)   Reciprocal Relationships supportive family   Service to Others homemaker   Intrinsic Gratification watching TV   Psychosocial   Psychosocial (WDL) X   Patient Behaviors/Mood Anxious; Cooperative   Subjective   Subjective "I have a bad left knee "   ADL   Where Assessed Edge of bed   Eating Assistance 6  Modified independent   Grooming Assistance 6  Modified Independent   UB Bathing Assistance 5  Supervision/Setup   LB Bathing Assistance 3 Moderate Assistance   UB Dressing Assistance 5  Supervision/Setup   LB Dressing Assistance 3  Moderate Assistance   Bed Mobility   Rolling R 3  Moderate assistance   Additional items Assist x 1; Increased time required;Verbal cues;LE management   Rolling L 3  Moderate assistance   Additional items Assist x 1; Increased time required;Verbal cues;LE management   Supine to Sit 3  Moderate assistance   Additional items Assist x 1; Increased time required;Verbal cues;LE management   Transfers   Sit to Stand 3  Moderate assistance   Additional items Assist x 1; Increased time required   Stand to Sit 3  Moderate assistance   Additional items Assist x 1;Verbal cues; Increased time required   Additional Comments bp's=101/78(EOB), 118/77(sitting in chair after ambulation); SPO2=98% on RA, HR=84bpm   Functional Mobility   Functional Mobility 3  Moderate assistance   Additional Comments x1   Additional items Rolling walker   Balance   Static Sitting Fair +   Dynamic Sitting Fair   Static Standing Poor +   Dynamic Standing Poor   Activity Tolerance   Activity Tolerance Patient limited by fatigue;Patient limited by pain   Medical Staff Made Aware nsg, P T     RUE Assessment   RUE Assessment WFL   RUE Strength   RUE Overall Strength Within Functional Limits - able to perform ADL tasks with strength  (4/5 throughout)   LUE Assessment   LUE Assessment WFL   LUE Strength   LUE Overall Strength Within Functional Limits - able to perform ADL tasks with strength  (4/5 throughout)   Hand Function   Gross Motor Coordination Functional   Fine Motor Coordination Functional   Sensation   Light Touch No apparent deficits   Proprioception   Proprioception No apparent deficits   Vision-Basic Assessment   Current Vision Wears glasses all the time   Vision - Complex Assessment   Acuity Able to read employee name badge without difficulty   Perception   Inattention/Neglect Appears intact   Cognition   Overall Cognitive Status Impaired Arousal/Participation Alert   Attention Attends with cues to redirect   Orientation Level Oriented X4   Memory Decreased recall of precautions;Decreased short term memory   Following Commands Follows one step commands with increased time or repetition   Comments hx memory loss   Assessment   Limitation Decreased ADL status; Decreased UE strength;Decreased Safe judgement during ADL;Decreased cognition;Decreased endurance;Decreased high-level ADLs   Prognosis Good   Assessment Pt is a 57y/o female admitted to the hospital for an elected R TKR(3/9) 2* OA  Pt is currently allowed WBAT R LE  Pt with PMH anxiety, bipolar, fibromyalgia, [place], HTN, CVA--L residual weakness, MO, memory loss, PTSD  PTA pt states that she had ocassional assistance with her ADLs, transfers, ambulation--with rollator, household distances; +falls=3, neg home alone, neg   During initial eval, pt demonstrated deficits with her functional balance, functional mobility, ADL status, transfer safety, b/l UE strength, activity tolerance(currently fair=15-20mins), and questionable cognition(i e problem-solving, judgement/safety)  Pt would benefit from continued OT tx for the above deficits  3-5xwk/1-2wks  Goals   Patient Goals "less pain"   STG Time Frame   (1-7 days)   Short Term Goal #1 Pt will demonstrate improved activity tolerance to good(20-30mins) and standing tolerance to 3-5mins to assist with ADLs  Short Term Goal #2 Pt will demonstrate proper walker/transfer safety 100% of the time  Short Term Goal  Pt will demonstrate mod I with their bed mobility to facilitate EOB ADLs  LTG Time Frame   (7-14 days)   Long Term Goal #1 Pt will tolerate continued cognitive/home-safety assessment and appropriate d/c recommendations will be provided  Long Term Goal #2 Pt will demonstrate g/g- balance with all functional activities  Long Term Goal Pt will demonstrate mod I with their UE and LE bathing/dresssing      Plan   Treatment Interventions ADL retraining;Functional transfer training;UE strengthening/ROM; Endurance training;Cognitive reorientation;Patient/family training;Equipment evaluation/education; Compensatory technique education;Continued evaluation   Goal Expiration Date 03/24/22   OT Treatment Day 0   OT Frequency 3-5x/wk   Recommendation   OT Discharge Recommendation Post acute rehabilitation services   Equipment Recommended Bedside commode   Commode Type Standard   AM-PAC Daily Activity Inpatient   Lower Body Dressing 2   Bathing 2   Toileting 3   Upper Body Dressing 3   Grooming 4   Eating 4   Daily Activity Raw Score 18   Daily Activity Standardized Score (Calc for Raw Score >=11) 38 66   AM-PAC Applied Cognition Inpatient   Following a Speech/Presentation 3   Understanding Ordinary Conversation 3   Taking Medications 3   Remembering Where Things Are Placed or Put Away 3   Remembering List of 4-5 Errands 3   Taking Care of Complicated Tasks 3   Applied Cognition Raw Score 18   Applied Cognition Standardized Score 38 07   Lionel Essex, OT

## 2022-03-10 NOTE — PLAN OF CARE
Problem: OCCUPATIONAL THERAPY ADULT  Goal: Performs self-care activities at highest level of function for planned discharge setting  See evaluation for individualized goals  Description: Treatment Interventions: ADL retraining,Functional transfer training,UE strengthening/ROM,Endurance training,Cognitive reorientation,Patient/family training,Equipment evaluation/education,Compensatory technique education,Continued evaluation  Equipment Recommended: Bedside commode       See flowsheet documentation for full assessment, interventions and recommendations  Note: Limitation: Decreased ADL status,Decreased UE strength,Decreased Safe judgement during ADL,Decreased cognition,Decreased endurance,Decreased high-level ADLs  Prognosis: Good  Assessment: Pt is a 57y/o female admitted to the hospital for an elected R TKR(3/9) 2* OA  Pt is currently allowed WBAT R LE  Pt with PMH anxiety, bipolar, fibromyalgia, [place], HTN, CVA--L residual weakness, MO, memory loss, PTSD  PTA pt states that she had ocassional assistance with her ADLs, transfers, ambulation--with rollator, household distances; +falls=3, neg home alone, neg   During initial eval, pt demonstrated deficits with her functional balance, functional mobility, ADL status, transfer safety, b/l UE strength, activity tolerance(currently fair=15-20mins), and questionable cognition(i e problem-solving, judgement/safety)  Pt would benefit from continued OT tx for the above deficits  3-5xwk/1-2wks        OT Discharge Recommendation: Post acute rehabilitation services

## 2022-03-10 NOTE — PLAN OF CARE
Problem: PHYSICAL THERAPY ADULT  Goal: Performs mobility at highest level of function for planned discharge setting  See evaluation for individualized goals  Description: Treatment/Interventions: Functional transfer training,LE strengthening/ROM,Elevations,Therapeutic exercise,Cognitive reorientation,Patient/family training,Equipment eval/education,Bed mobility,Gait training,Compensatory technique education,Continued evaluation,Spoke to nursing          See flowsheet documentation for full assessment, interventions and recommendations  Outcome: Progressing  Note: Prognosis: Fair  Problem List: Decreased strength,Decreased range of motion,Decreased endurance,Impaired balance,Decreased mobility,Decreased cognition,Obesity,Decreased skin integrity,Pain  Assessment: PT SEEN FOR PT TREATMENT SESSION  PT REPORTS B/L KNEE PAIN r KNEE PAIN 8/10  PT  SEEN  FOR PROGRESSION OF MOBILITY  PT  WITH IMPROVED COMMUNICATION THIS SESSION  PT  IS CURRENTLY REQUIRING MOD ASSIST X1 FOR BED MOBILITY, TRANSFERS AND AMBULATION  PT REQUIRES MAX CUES FOR MOBILITY TECHNIQUES AND SAFETY  PT  ABLE TO AMBULATE FARTHER DISTANCES THIS SESSION PROGRESSING TO 12' WITH VERBAL CUES FOR LE SEQUENCING, WALKER ADVANCEMENT AND UE WEIGHTBEARING TECHNIQUES AND CUES TO MAINTAIN  ON WALKER AT ALL TIMES  GAIT DEVIATIONS AS NOTED IN FLOW SHEET AND UNSTEADY GAIT, DECREASED ACTIVITY TOLERANCE, FUNCTIONAL ENDURANCE, INCREASED DISTRACTIBILITY, DECREASED SAFETY, MOBILITY, AND STRENGTH IMPAIRING FUNCTION AND MOBILITY  PT REMAINS AT INCREASED RISK FOR FALLS  IMPROVED WEIGHTBEARING THROUGH R LE NOTED, PT ABLE TO PERFORM wbat THIS SESSION  PT  PERFORMS SUPINE A-AAROM TO R LE FOR tkr,PT UNABLE TO I'LY SLR, PERFORM HS, OR HIP ABD /ADD  Alexandra Richardist PT RESISTIVE AT TIMES TO ROM EXERCISES  PT  WILL BENEFIT FROM CONTINUED INPT PT AND STRA T D/C IN ORDER TO MAXIMIZE FUNCTIONAL OUTCOMES, MOBILITY AND INDEPENDENCE FOR IMPROVED QUALITY OF LIFE    The patient's AM-PAC Basic Mobility Inpatient Short Form Raw Score is 11  A Raw score of less than or equal to 16 suggests the patient may benefit from discharge to post-acute rehabilitation services  Please also refer to the recommendation of the Physical Therapist for safe discharge planning  Barriers to Discharge: Inaccessible home environment,Decreased caregiver support  Barriers to Discharge Comments: zoe? level of supervision at home? PT Discharge Recommendation: Post acute rehabilitation services          See flowsheet documentation for full assessment

## 2022-03-10 NOTE — CONSULTS
[address] 1965, 60 y o  female MRN: [id number]  Unit/Bed#: E2 -02 Encounter: [id number]  Primary Care Provider: Bette Hermosillo DO   Date and time admitted to hospital: 3/9/2022  9:14 AM    Inpatient consult to Internal Medicine  Consult performed by: Belen Grimm MD  Consult ordered by: Karla Del Cid PA-C          * S/P total knee arthroplasty, right  Assessment & Plan  Patient would history of bilateral knee pain, with worsening right knee arthritis  Status post 3/9 right total knee arthroplasty  Consulted by orthopedic surgery for medical management  PT and OT currently recommend rehab  Tolerating diet, denies any nausea vomiting  Currently on room air, blood pressure control, labs fairly unremarkable  Initial hemoglobin of 14, 11 6 expected blood loss s/p post surgery  Currently receiving IV iron    Patient reports multiple complaints of pain, abdominal, lower back and knee pain  Reviewed prior history is, patient does have chronic pain disorder and has been following nurse surgery outpatient for her back  Her abdomen is soft, nontender with normal bowel sounds, reports last bowel movement several days ago      She did not appear to be constipated this time, would recommend adding senna and MiraLax to bowel regimen  Continue psychiatric medications for bipolar and anxiety/depression  Follow-up with orthopedic surgery as previously scheduled  DVT prophylaxis per primary team    History of CVA (cerebrovascular accident)  Assessment & Plan  Continue aspirin statin    Tardive dyskinesia  Assessment & Plan  Continue valbenazene and Lyrica  Appears to be at baseline    Essential hypertension  Assessment & Plan  Continue inderal and prazosin  BP currently controlled    Generalized anxiety disorder  Assessment & Plan  Continue Paxil and BuSpar    Esophageal reflux  Assessment & Plan  Continue PPI    Bipolar II disorder ([facility] [address] )  Assessment & Plan  Follows with Psychiatry outpatient  Continue lithium, and Seroquel bedtime    Lumbar radiculopathy  Assessment & Plan  · s/p thoracolumbar decompression and fusion in 2018 in [place] with subsequent laminectomy syndrome   · S/p thoracic spinal cord stimulator placement in 7/28/20  · Recommend continue follow-up with Neurosurgery outpatient for further evaluation as patient does not use spinal stimulator at this time      Chronic pain disorder  Assessment & Plan  History of opioid dependence, reporting bilateral knee pain  Caution regarding significant opioids for pain control  Recommend topical agents, Voltaren, diclofenac, Lidoderm patches and or creams  Minimize oxycodone as needed to work with physical therapy        VTE Prophylaxis: Sequential compression device (Venodyne)   / sequential compression device     Recommendations for Discharge:  · Currently recommending short-term rehab  · No changes in psychiatric medications  · Blood pressure control, continue her prazosin and Inderal  · Minimize minimize opiates with history of chronic opioid dependency    Counseling / Coordination of Care Time: 30 minutes  Greater than 50% of total time spent on patient counseling and coordination of care  Collaboration of Care: Were Recommendations Directly Discussed with Primary Treatment Team? - Yes     History of Present Illness:    Smaantha CRISTIANO Lauren Ortiz is a 60 y o  female who is originally admitted to the orthopedic service due to elective right knee arthroplasty  We are consulted for medical management  Past medical history of bipolar disorder, anxiety/depression, hypertension, and CVA  Patient has been having chronic pain especially her knees known to have arthritis on mandible to conservative therapy  Patient had right knee arthroplasty completed on 03/09  Patient tolerated procedure well, this morning she reports having abdominal discomfort, lower back pain    Appears her lower back pain has been chronic and she has fall neurosurgeon in the past with spinal stimulator which she does not use  She reports not having a bowel movement, last bowel movement several days ago  On room air, denies any chest pain or shortness of breath  She had difficulty ambulating with physical therapy today  Currently recommended for short-term rehab  Review of Systems:    Review of Systems   Constitutional: Negative for activity change, appetite change, chills and fever  HENT: Negative for congestion, facial swelling, sinus pain and sore throat  Respiratory: Negative for cough, shortness of breath and wheezing  Cardiovascular: Positive for leg swelling  Negative for chest pain and palpitations  Gastrointestinal: Positive for abdominal pain and constipation  Negative for abdominal distention, diarrhea, nausea and vomiting  Endocrine: Negative for cold intolerance, heat intolerance, polydipsia, polyphagia and polyuria  Genitourinary: Negative for dysuria, flank pain and urgency  Musculoskeletal: Positive for back pain  Skin: Negative for color change and pallor  Neurological: Negative for dizziness, syncope, weakness, light-headedness and headaches  Psychiatric/Behavioral: Negative for agitation, confusion and dysphoric mood  Past Medical and Surgical History:     Past Medical History:   Diagnosis Date    Acid reflux     Anxiety     RESOLVED: [id number]    Arthritis     Bipolar 2 disorder (HCC)     FOLLOWS WITH PSYCHIATRIST   CONTINUE LAMOTRIGINE; RESOLVED: [id number]    Depression     Familial tremor     both hands    Fibromyalgia     LAST ASSESSED: [id number]    GERD (gastroesophageal reflux disease)     Hearing aid worn     left ear    [place] (hard of hearing)     left ear    Hyperlipidemia     Hypertension     Left-sided weakness     Lower back pain     Memory loss of unknown cause     long and short term    Migraine     Obesity     Obesity, Class II, BMI 35-39 9     Overactive bladder     Panic attack     Post traumatic stress disorder     Seasonal allergies     Stroke [facility])     questionable stroke 2009    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    CONT ASA 81 MG DAILY; RESOLVED: [id number]    Urinary incontinence     Wears dentures     partial lower / full upper    Wears glasses        Past Surgical History:   Procedure Laterality Date    BACK SURGERY       SECTION      COLONOSCOPY      RESOLVED: [id number]    EAR SURGERY      EGD      HYSTERECTOMY      MYRINGOTOMY W/ TUBES Left     NECK SURGERY  2019    VT CYSTOURETHROSCOPY N/A 2016    Procedure: CYSTOSCOPY, BOTOX INJECTION;  Surgeon: Bebo Delacruz MD;  [facility];  Service: Gynecology    VT IMPLANT SPINAL NEUROSTIM/ Right 2/10/2021    Procedure: REPLACEMENT IMPLANTABLE PULSE GENERATOR DORSAL SPINAL COLUMN STIMULATOR, RIGHT;  Surgeon: Citlaly Bermudez MD;  [facility];  Service: Neurosurgery    VT PERCUT IMPLNT [address] Right 2020    Procedure: INSERTION THORACIC DORSAL COLUMN SPINAL CORD STIMULATOR PERCUTANEOUS W IMPLANTABLE PULSE GENERATOR, RIGHT;  Surgeon: Citlaly Bermudez MD;  [facility];  Service: Neurosurgery    VT TOTAL KNEE ARTHROPLASTY Right 3/9/2022    Procedure: ARTHROPLASTY KNEE TOTAL;  Surgeon: Alexandra Jasso DO;  [facility];  Service: Orthopedics    TONSILLECTOMY     [address] [place]    UPPER GASTROINTESTINAL ENDOSCOPY  2020       Meds/Allergies:    all medications and allergies reviewed    Allergies: No Known Allergies    Social History:     Marital Status:     Substance Use History:   Social History     Substance and Sexual Activity   Alcohol Use Not Currently    Comment: 2 x year; being a social drinker as per all scripts      Social History     Tobacco Use   Smoking Status Never Smoker   Smokeless Tobacco Never Used     Social History     Substance and Sexual Activity   Drug Use No Family History:    Family History   Problem Relation Age of Onset    Colon cancer Mother     Alzheimer's disease Father     Stroke Father     Colon cancer Brother     Bipolar disorder Brother     Breast cancer Maternal Aunt     Colon cancer Maternal Aunt     Heart disease Other     Diabetes Other     Hypertension Other     Seizures Son     Depression Paternal Grandfather     No Known Problems Sister     No Known Problems Brother     Thyroid disease Neg Hx        Physical Exam:     Vitals:   Blood Pressure: 125/79 (03/10/22 0720)  Pulse: 66 (03/10/22 0720)  Temperature: (!) 96 2 °F (35 7 °C) (03/10/22 0720)  Temp Source: Temporal (03/10/22 0720)  Respirations: 18 (03/10/22 0720)  Height: 5' (152 4 cm) (03/09/22 1611)  Weight - Scale: 81 2 kg (179 lb) (03/09/22 1611)  SpO2: 97 % (03/10/22 0720)    Physical Exam  Vitals and nursing note reviewed  Constitutional:       Appearance: Normal appearance  She is obese  HENT:      Head: Normocephalic and atraumatic  Eyes:      General: No scleral icterus  Conjunctiva/sclera: Conjunctivae normal    Cardiovascular:      Rate and Rhythm: Normal rate and regular rhythm  Heart sounds: Normal heart sounds  Pulmonary:      Effort: Pulmonary effort is normal  No respiratory distress  Breath sounds: Normal breath sounds  No wheezing  Abdominal:      General: Bowel sounds are normal  There is no distension  Palpations: Abdomen is soft  Tenderness: There is abdominal tenderness  Musculoskeletal:         General: Swelling present  Right lower leg: No edema  Left lower leg: No edema  Skin:     General: Skin is warm and dry  Neurological:      General: No focal deficit present  Mental Status: She is alert  Mental status is at baseline  Psychiatric:         Mood and Affect: Mood normal          Additional Data:     Lab Results: I have personally reviewed pertinent reports        Results from last 7 days   Lab Units 03/10/22  0515 03/05/22  0854 03/05/22  0854   WBC Thousand/uL 10 11   < > 3 70*   HEMOGLOBIN g/dL 11 6   < > 14 3   HEMATOCRIT % 34 4*   < > 42 9   PLATELETS Thousands/uL 312   < > 371   NEUTROS PCT %  --   --  69*   LYMPHS PCT %  --   --  22*   MONOS PCT %  --   --  6   EOS PCT %  --   --  2    < > = values in this interval not displayed  Results from last 7 days   Lab Units 03/10/22  0515 03/09/22  0951 03/05/22  0854   SODIUM mmol/L 138   < > 142   POTASSIUM mmol/L 4 0   < > 3 1*   CHLORIDE mmol/L 105   < > 105   CO2 mmol/L 26   < > 29   BUN mg/dL 7   < > 6   CREATININE mg/dL 0 70   < > 0 75   ANION GAP mmol/L 7   < > 8   CALCIUM mg/dL 8 0*   < > 8 6   ALBUMIN g/dL  --   --  4 4   TOTAL BILIRUBIN mg/dL  --   --  1 09   ALK PHOS U/L  --   --  92   ALT U/L  --   --  16   AST U/L  --   --  20   GLUCOSE RANDOM mg/dL 110   < >  --     < > = values in this interval not displayed  Results from last 7 days   Lab Units 03/05/22  0854   INR  1 02         Lab Results   Component Value Date/Time    HGBA1C 4 7 03/05/2022 08:54 AM    HGBA1C 4 6 03/01/2022 09:51 AM    HGBA1C 4 8 02/08/2021 05:56 AM    HGBA1C 4 8 02/08/2021 12:00 AM    HGBA1C 5 0 09/04/2020 09:22 AM    HGBA1C 5 2 07/08/2020 08:41 AM               Imaging: I have personally reviewed pertinent reports  No orders to display       EKG, Pathology, and Other Studies Reviewed on Admission:   · EKG: None    ** Please Note: This note has been constructed using a voice recognition system   **

## 2022-03-10 NOTE — PHYSICAL THERAPY NOTE
PHYSICAL THERAPY NOTE          Patient Name: Alessandra Springer  [id number] Date: 3/10/2022     03/10/22 8770   Note Type   Note Type Treatment   Pain Assessment   Pain Assessment Tool 0-10   Pain Score 8   Pain Location/Orientation Orientation: Right;Location: Knee   Hospital Pain Intervention(s) Repositioned; Ambulation/increased activity; Emotional support;Cold applied   Restrictions/Precautions   RLE Weight Bearing Per Order WBAT   Other Precautions Cognitive; Chair Alarm; Bed Alarm; Fall Risk;Pain;WBS   General   Chart Reviewed Yes   Cognition   Overall Cognitive Status Impaired   Arousal/Participation Alert; Cooperative;Responsive   Attention Attends with cues to redirect   Orientation Level Oriented to person;Oriented to time;Oriented to situation   Memory Decreased recall of precautions   Following Commands Follows one step commands with increased time or repetition   Subjective   Subjective MY R KNEE HURTS  PT RATES PAIN 8/10  I HOPE SHE IS NOT SENDING ME HOME TODAY  Bed Mobility   Supine to Sit 3  Moderate assistance   Additional items Assist x 1;HOB elevated; Bedrails; Increased time required;Verbal cues;LE management   Transfers   Sit to Stand 3  Moderate assistance   Additional items Assist x 1;Bedrails; Increased time required;Verbal cues   Stand to Sit 3  Moderate assistance   Additional items Assist x 1; Armrests; Increased time required;Verbal cues   Ambulation/Elevation   Gait pattern Poor UE support; Improper Weight shift; Antalgic;Decreased R stance; Short stride; Step to;Excessively slow;Decreased foot clearance   Gait Assistance 3  Moderate assist   Additional items Assist x 1;Verbal cues   Assistive Device Rolling walker   Distance 12' X1   Balance   Static Sitting Fair +   Dynamic Sitting Fair   Static Standing Poor +   Dynamic Standing Poor   Ambulatory Poor   Activity Tolerance   Activity Tolerance Patient limited by pain Nurse Made Aware PAVAN SIFUENTES   Exercises   TKR Supine;10 reps;AAROM; Right  (AP, QS, A/A SLR, A/A HIP ABD /ADD , A/A HS, )   Assessment   Prognosis Fair   Problem List Decreased strength;Decreased range of motion;Decreased endurance; Impaired balance;Decreased mobility; Decreased cognition;Obesity; Decreased skin integrity;Pain   Assessment PT SEEN FOR PT TREATMENT SESSION  PT REPORTS B/L KNEE PAIN r KNEE PAIN 8/10  PT  SEEN  FOR PROGRESSION OF MOBILITY  PT  WITH IMPROVED COMMUNICATION THIS SESSION  PT  IS CURRENTLY REQUIRING MOD ASSIST X1 FOR BED MOBILITY, TRANSFERS AND AMBULATION  PT REQUIRES MAX CUES FOR MOBILITY TECHNIQUES AND SAFETY  PT  ABLE TO AMBULATE FARTHER DISTANCES THIS SESSION PROGRESSING TO 12' WITH VERBAL CUES FOR LE SEQUENCING, WALKER ADVANCEMENT AND UE WEIGHTBEARING TECHNIQUES AND CUES TO MAINTAIN  ON WALKER AT ALL TIMES  GAIT DEVIATIONS AS NOTED IN FLOW SHEET AND UNSTEADY GAIT, DECREASED ACTIVITY TOLERANCE, FUNCTIONAL ENDURANCE, INCREASED DISTRACTIBILITY, DECREASED SAFETY, MOBILITY, AND STRENGTH IMPAIRING FUNCTION AND MOBILITY  PT REMAINS AT INCREASED RISK FOR FALLS  IMPROVED WEIGHTBEARING THROUGH R LE NOTED, PT ABLE TO PERFORM wbat THIS SESSION  PT  PERFORMS SUPINE A-AAROM TO R LE FOR tkr,PT UNABLE TO I'LY SLR, PERFORM HS, OR HIP ABD /ADD  Jose Alfredo Po PT RESISTIVE AT TIMES TO ROM EXERCISES  PT  WILL BENEFIT FROM CONTINUED INPT PT AND STRA T D/C IN ORDER TO MAXIMIZE FUNCTIONAL OUTCOMES, MOBILITY AND INDEPENDENCE FOR IMPROVED QUALITY OF LIFE  The patient's AM-PAC Basic Mobility Inpatient Short Form Raw Score is 11  A Raw score of less than or equal to 16 suggests the patient may benefit from discharge to post-acute rehabilitation services  Please also refer to the recommendation of the Physical Therapist for safe discharge planning     Goals   Patient Goals TO WALK   STG Expiration Date 03/19/22   PT Treatment Day 2   Plan   Treatment/Interventions Functional transfer training;LE strengthening/ROM; Therapeutic exercise; Endurance training;Cognitive reorientation;Patient/family training;Equipment eval/education; Bed mobility;Gait training;Spoke to nursing;OT   Progress Slow progress, decreased activity tolerance   PT Frequency Twice a day   Recommendation   PT Discharge Recommendation Post acute rehabilitation services   AM-PAC Basic Mobility Inpatient   Turning in Bed Without Bedrails 2   Lying on Back to Sitting on Edge of Flat Bed 2   Moving Bed to Chair 2   Standing Up From Chair 2   Walk in Room 2   Climb 3-5 Stairs 1   Basic Mobility Inpatient Raw Score 11   Basic Mobility Standardized Score 30 25   Highest Level Of Mobility   -[facility] Goal 4: Move to chair/commode   -[facility] Highest Level of Mobility 5: Stand (1 or more minutes)   -[facility] Goal Achieved Yes   Education   Education Provided Mobility training  (BENEFITS OF MOBILITY AND HEP, PT POC AND REC FOR REHB)   End of Consult   Patient Position at End of Consult Bedside chair;Bed/Chair alarm activated; All needs within reach   [place], [place]

## 2022-03-10 NOTE — PLAN OF CARE
Problem: PAIN - ADULT  Goal: Verbalizes/displays adequate comfort level or baseline comfort level  Description: Interventions:  - Encourage patient to monitor pain and request assistance  - Assess pain using appropriate pain scale  - Administer analgesics based on type and severity of pain and evaluate response  - Implement non-pharmacological measures as appropriate and evaluate response  - Consider cultural and social influences on pain and pain management  - Notify physician/advanced practitioner if interventions unsuccessful or patient reports new pain  Outcome: Progressing     Problem: MOBILITY - ADULT  Goal: Maintain or return to baseline ADL function  Description: INTERVENTIONS:  -  Assess patient's ability to carry out ADLs; assess patient's baseline for ADL function and identify physical deficits which impact ability to perform ADLs (bathing, care of mouth/teeth, toileting, grooming, dressing, etc )  - Assess/evaluate cause of self-care deficits   - Assess range of motion  - Assess patient's mobility; develop plan if impaired  - Assess patient's need for assistive devices and provide as appropriate  - Encourage maximum independence but intervene and supervise when necessary  - Involve family in performance of ADLs  - Assess for home care needs following discharge   - Consider OT consult to assist with ADL evaluation and planning for discharge  - Provide patient education as appropriate  Outcome: Progressing  Goal: Maintains/Returns to pre admission functional level  Description: INTERVENTIONS:  - Perform BMAT or MOVE assessment daily    - Set and communicate daily mobility goal to care team and patient/family/caregiver  - Collaborate with rehabilitation services on mobility goals if consulted  - Perform Range of Motion 3  times a day  - Reposition patient every 2 hours    - Dangle patient 3 times a day  - Stand patient 3  times a day  - Ambulate patient 3  times a day  - Out of bed to chair 3  times a day   - Out of bed for meals 3   Problem: DISCHARGE PLANNING  Goal: Discharge to home or other facility with appropriate resources  Description: INTERVENTIONS:  - Identify barriers to discharge w/patient and caregiver  - Arrange for needed discharge resources and transportation as appropriate  - Identify discharge learning needs (meds, wound care, etc )  - Arrange for interpretive services to assist at discharge as needed  - Refer to Case Management Department for coordinating discharge planning if the patient needs post-hospital services based on physician/advanced practitioner order or complex needs related to functional status, cognitive ability, or social support system  Outcome: Progressing     Problem: Knowledge Deficit  Goal: Patient/family/caregiver demonstrates understanding of disease process, treatment plan, medications, and discharge instructions  Description: Complete learning assessment and assess knowledge base    Interventions:  - Provide teaching at level of understanding  - Provide teaching via preferred learning methods  Outcome: Progressing    times a day  - Out of bed for toileting  - Record patient progress and toleration of activity level   Outcome: Progressing

## 2022-03-10 NOTE — PLAN OF CARE
Problem: PHYSICAL THERAPY ADULT  Goal: Performs mobility at highest level of function for planned discharge setting  See evaluation for individualized goals  Description: Treatment/Interventions: Functional transfer training,LE strengthening/ROM,Elevations,Therapeutic exercise,Cognitive reorientation,Patient/family training,Equipment eval/education,Bed mobility,Gait training,Compensatory technique education,Continued evaluation,Spoke to nursing          See flowsheet documentation for full assessment, interventions and recommendations  3/10/2022 1528 by Raeann Raman PTA  Outcome: Progressing  Note: Prognosis: Fair  Problem List: Decreased strength,Decreased range of motion,Decreased endurance,Impaired balance,Decreased mobility,Decreased cognition,Decreased coordination,Impaired judgement,Decreased safety awareness,Obesity,Decreased skin integrity,Pain  Assessment: PT SEEN FOR PT TREATMENT SESSION  PT REPORTS B/L KNEE PAIN r KNEE PAIN 6/10  PT  SEEN  FOR PROGRESSION OF MOBILITY  PT REQUIRES FREQUENT REDIRECTION  PT  IS CURRENTLY REQUIRING MOD ASSIST X1 FOR BED MOBILITY SUPINE TO SIT AND MOD ASSIST X2 FOR SIT TO SUPINE,  INCREASED ASSISTANCE FOR TRANSFERS AND AMBULATION  REQUIREDTHIS SESSION  PT PERFORMS WITH MOD ASSIST X2 DUE TO FATIGUE, PAIN, DECREASED BALANCE,SAFETY AND MOBILITY  PT REQUIRES MAX CUES FOR MOBILITY TECHNIQUES AND SAFETY  PT  ABLE TO AMBULATE FARTHER DISTANCES THIS SESSION PROGRESSING TO 18' WITH VERBAL CUES FOR LE SEQUENCING, WALKER ADVANCEMENT AND UE WEIGHTBEARING TECHNIQUES AND CUES TO MAINTAIN  ON WALKER AT ALL TIMES  GAIT DEVIATIONS AS NOTED IN FLOW SHEET (+) BUCKLING OF r KNEE X1  AND UNSTEADY GAIT NOTED  DECREASED ACTIVITY TOLERANCE, FUNCTIONAL ENDURANCE, INCREASED DISTRACTIBILITY, DECREASED SAFETY, MOBILITY, AND STRENGTH IMPAIRING FUNCTION AND MOBILITY  PT REMAINS AT INCREASED RISK FOR FALLS    PT  PERFORMS SUPINE AAROM TO R LE FOR tkr,PT UNABLE TO I'LY SLR, PERFORM HS, OR HIP ABD /ADD  Yolanda Raman PT RESISTIVE AT TIMES TO ROM EXERCISES  MAX CUYES FOR EXERCIS TECHNIQUES AND PERFORMANCE REQUIRED  PT  WILL BENEFIT FROM CONTINUED INPT PT AND STR AT D/C IN ORDER TO MAXIMIZE FUNCTIONAL OUTCOMES, MOBILITY AND INDEPENDENCE FOR IMPROVED QUALITY OF LIFE  The patient's AM-PAC Basic Mobility Inpatient Short Form Raw Score is 11  A Raw score of less than or equal to 16 suggests the patient may benefit from discharge to post-acute rehabilitation services  Please also refer to the recommendation of the Physical Therapist for safe discharge planning  Barriers to Discharge: Inaccessible home environment,Decreased caregiver support  Barriers to Discharge Comments: zoe? level of supervision at home? PT Discharge Recommendation: Post acute rehabilitation services          See flowsheet documentation for full assessment

## 2022-03-10 NOTE — PHYSICAL THERAPY NOTE
PHYSICAL THERAPY NOTE          Patient Name: Ced Aguayo  [id number] Date: 3/10/2022     03/10/22 1410   Note Type   Note Type BID visit/treatment   Pain Assessment   Pain Assessment Tool 0-10   Pain Score 6   Pain Location/Orientation Orientation: Right;Location: Knee   Hospital Pain Intervention(s) Repositioned;Cold applied; Ambulation/increased activity; Emotional support; Rest   Restrictions/Precautions   RLE Weight Bearing Per Order WBAT   Other Precautions Chair Alarm;Cognitive; Bed Alarm;Pain; Fall Risk   Cognition   Arousal/Participation Alert   Attention Attends with cues to redirect   Orientation Level Oriented to person;Oriented to time;Oriented to situation   Memory Decreased recall of precautions;Decreased short term memory   Following Commands Follows one step commands with increased time or repetition   Subjective   Subjective i TRIED TO GO TO THE BATHROOM BUT I COULD NOT GO  Elijah Good Bed Mobility   Supine to Sit 3  Moderate assistance   Additional items Assist x 1;LE management;Verbal cues; Increased time required; Bedrails;HOB elevated   Sit to Supine 3  Moderate assistance   Additional items Assist x 2; Increased time required;Verbal cues;LE management   Transfers   Sit to Stand 3  Moderate assistance   Additional items Assist x 2; Increased time required;Verbal cues; Bedrails   Stand to Sit 3  Moderate assistance   Additional items Increased time required;Verbal cues; Assist x 2   Ambulation/Elevation   Gait pattern Poor UE support; Improper Weight shift; Forward Flexion;R Knee Analilia; Short stride; Step to;Excessively slow;Decreased R stance;Decreased foot clearance; Inconsistent zack; Antalgic   Gait Assistance 3  Moderate assist   Additional items Assist x 2   Assistive Device Rolling walker   Distance 18' X1   Balance   Static Sitting Fair +   Dynamic Sitting Fair   Static Standing Poor +   Dynamic Standing Poor   Ambulatory Poor Activity Tolerance   Activity Tolerance Patient limited by pain; Patient limited by fatigue   Nurse Made Aware PAVAN SIFUENTES   Exercises   TKR Supine;10 reps;AAROM; Right;Bilateral;AROM  (A/A AP, QS, HIP ABD /ADD , SLR AND A/A-A HS   )   Assessment   Prognosis Fair   Problem List Decreased strength;Decreased range of motion;Decreased endurance; Impaired balance;Decreased mobility; Decreased cognition;Decreased coordination; Impaired judgement;Decreased safety awareness; Obesity; Decreased skin integrity;Pain   Assessment PT SEEN FOR PT TREATMENT SESSION   PT REPORTS B/L KNEE PAIN r KNEE PAIN 6/10   PT  SEEN  FOR PROGRESSION OF MOBILITY   PT REQUIRES FREQUENT REDIRECTION    PT  IS CURRENTLY REQUIRING MOD ASSIST X1 FOR BED MOBILITY SUPINE TO SIT AND MOD ASSIST X2 FOR SIT TO SUPINE,  INCREASED ASSISTANCE FOR TRANSFERS AND AMBULATION  REQUIREDTHIS SESSION  PT PERFORMS WITH MOD ASSIST X2 DUE TO FATIGUE, PAIN, DECREASED BALANCE,SAFETY AND MOBILITY     PT REQUIRES MAX CUES FOR MOBILITY TECHNIQUES AND SAFETY   PT  ABLE TO AMBULATE FARTHER DISTANCES THIS SESSION PROGRESSING TO 18' WITH VERBAL CUES FOR LE SEQUENCING, WALKER ADVANCEMENT AND UE WEIGHTBEARING TECHNIQUES AND CUES TO MAINTAIN  ON WALKER AT ALL TIMES  GAIT DEVIATIONS AS NOTED IN FLOW SHEET (+) BUCKLING OF r KNEE X1  AND UNSTEADY GAIT NOTED  DECREASED ACTIVITY TOLERANCE, FUNCTIONAL ENDURANCE, INCREASED DISTRACTIBILITY, DECREASED SAFETY, MOBILITY, AND STRENGTH IMPAIRING FUNCTION AND MOBILITY   PT REMAINS AT INCREASED RISK FOR FALLS  PT  PERFORMS SUPINE AAROM TO R LE FOR tkr,PT UNABLE TO I'LY SLR, PERFORM HS, OR HIP ABD /ADD  Alexandra Frost PT RESISTIVE AT TIMES TO ROM EXERCISES  MAX CUYES FOR EXERCIS TECHNIQUES AND PERFORMANCE REQUIRED     PT  WILL BENEFIT FROM CONTINUED INPT PT AND STR AT D/C IN ORDER TO MAXIMIZE FUNCTIONAL OUTCOMES, MOBILITY AND INDEPENDENCE FOR IMPROVED QUALITY OF LIFE   The patient's AM-PAC Basic Mobility Inpatient Short Form Raw Score is 11   A Raw score of less than or equal to 16 suggests the patient may benefit from discharge to post-acute rehabilitation services  Please also refer to the recommendation of the Physical Therapist for safe discharge planning  Goals   Patient Goals TO GET BETTER, LESS PAIN   STG Expiration Date 03/19/22   PT Treatment Day 3   Plan   Treatment/Interventions Functional transfer training;LE strengthening/ROM; Therapeutic exercise; Endurance training;Patient/family training;Equipment eval/education; Bed mobility;Gait training;Spoke to nursing   Progress Progressing toward goals   PT Frequency Twice a day   Recommendation   PT Discharge Recommendation Post acute rehabilitation services   AM-PAC Basic Mobility Inpatient   Turning in Bed Without Bedrails 2   Lying on Back to Sitting on Edge of Flat Bed 2   Moving Bed to Chair 2   Standing Up From Chair 2   Walk in Room 2   Climb 3-5 Stairs 1   Basic Mobility Inpatient Raw Score 11   Basic Mobility Standardized Score 30 25   Highest Level Of Mobility   [facility] Goal 4: Move to chair/commode   [facility] Highest Level of Mobility 5: Stand (1 or more minutes)   [facility] Goal Achieved Yes   Education   Education Provided Other  (POSITINING OF R LE WHEN RESTING IN BED)   End of Consult   Patient Position at End of Consult Supine;Bed/Chair alarm activated; All needs within reach   [place], [place]

## 2022-03-10 NOTE — ASSESSMENT & PLAN NOTE
· Pt with h/o recent metatarsal fractures of right foot  · Patient reports that she hit her right foot on her walker today, notes worsening pain of right foot   · Sharp 7/10 pain with weight-bearing/ambulation; denies pain at rest  · Patient also notes that she was recently admitted for AMS/encephalopathy (6/27-7/7/21) and states she is unsure if she re-injured her right foot during that time   · Per ortho note 6/14/2021 Kolton Lopez)- patient has fracture of right 2nd through 4th metatarsal bases on originally on 05/01/2021; fractures noted to be healing at this visit  · Plan at this visit was as follows:  · Weightbearing as tolerated right lower extremity and postoperative shoe  Return to normal she was tolerating full weight without pain and postop shoe      · X-ray right foot 05/01/2021:  Nondisplaced fractures 2nd and 4th metatarsal, possibly involving base of 3rd metatarsal  · X-ray right foot 06/14/2021:  Fractures of the bases of 2nd through 4th metatarsals without significant interval change in alignment  · Order XR right foot to assess for acute osseous abnormalities/changes no

## 2022-03-11 LAB
ANION GAP SERPL CALCULATED.3IONS-SCNC: 8 MMOL/L (ref 4–13)
BUN SERPL-MCNC: 13 MG/DL (ref 5–25)
CALCIUM SERPL-MCNC: 7.9 MG/DL (ref 8.3–10.1)
CHLORIDE SERPL-SCNC: 104 MMOL/L (ref 100–108)
CO2 SERPL-SCNC: 26 MMOL/L (ref 21–32)
CREAT SERPL-MCNC: 0.95 MG/DL (ref 0.6–1.3)
ERYTHROCYTE [DISTWIDTH] IN BLOOD BY AUTOMATED COUNT: 13.9 % (ref 11.6–15.1)
GFR SERPL CREATININE-BSD FRML MDRD: 66 ML/MIN/1.73SQ M
GLUCOSE SERPL-MCNC: 118 MG/DL (ref 65–140)
HCT VFR BLD AUTO: 29.4 % (ref 34.8–46.1)
HGB BLD-MCNC: 10.1 G/DL (ref 11.5–15.4)
MCH RBC QN AUTO: 29.9 PG (ref 26.8–34.3)
MCHC RBC AUTO-ENTMCNC: 34.4 G/DL (ref 31.4–37.4)
MCV RBC AUTO: 87 FL (ref 82–98)
PLATELET # BLD AUTO: 236 THOUSANDS/UL (ref 149–390)
PMV BLD AUTO: 9.3 FL (ref 8.9–12.7)
POTASSIUM SERPL-SCNC: 3.2 MMOL/L (ref 3.5–5.3)
RBC # BLD AUTO: 3.38 MILLION/UL (ref 3.81–5.12)
SODIUM SERPL-SCNC: 138 MMOL/L (ref 136–145)
WBC # BLD AUTO: 6.71 THOUSAND/UL (ref 4.31–10.16)

## 2022-03-11 PROCEDURE — 97116 GAIT TRAINING THERAPY: CPT

## 2022-03-11 PROCEDURE — 97530 THERAPEUTIC ACTIVITIES: CPT

## 2022-03-11 PROCEDURE — 99232 SBSQ HOSP IP/OBS MODERATE 35: CPT | Performed by: STUDENT IN AN ORGANIZED HEALTH CARE EDUCATION/TRAINING PROGRAM

## 2022-03-11 PROCEDURE — 97110 THERAPEUTIC EXERCISES: CPT

## 2022-03-11 PROCEDURE — 99024 POSTOP FOLLOW-UP VISIT: CPT | Performed by: ORTHOPAEDIC SURGERY

## 2022-03-11 PROCEDURE — 80048 BASIC METABOLIC PNL TOTAL CA: CPT | Performed by: PHYSICIAN ASSISTANT

## 2022-03-11 PROCEDURE — 85027 COMPLETE CBC AUTOMATED: CPT | Performed by: PHYSICIAN ASSISTANT

## 2022-03-11 RX ORDER — POTASSIUM CHLORIDE 20 MEQ/1
40 TABLET, EXTENDED RELEASE ORAL ONCE
Status: COMPLETED | OUTPATIENT
Start: 2022-03-11 | End: 2022-03-11

## 2022-03-11 RX ORDER — CLONAZEPAM 0.5 MG/1
0.5 TABLET ORAL ONCE
Status: COMPLETED | OUTPATIENT
Start: 2022-03-11 | End: 2022-03-11

## 2022-03-11 RX ADMIN — OXYCODONE HYDROCHLORIDE 10 MG: 10 TABLET ORAL at 08:31

## 2022-03-11 RX ADMIN — PREGABALIN 150 MG: 75 CAPSULE ORAL at 21:48

## 2022-03-11 RX ADMIN — ACETAMINOPHEN 325MG 975 MG: 325 TABLET ORAL at 05:14

## 2022-03-11 RX ADMIN — CLONAZEPAM 0.5 MG: 0.5 TABLET ORAL at 15:26

## 2022-03-11 RX ADMIN — PREGABALIN 150 MG: 75 CAPSULE ORAL at 15:26

## 2022-03-11 RX ADMIN — OXYCODONE HYDROCHLORIDE 5 MG: 5 TABLET ORAL at 11:25

## 2022-03-11 RX ADMIN — FOLIC ACID 1 MG: 1 TABLET ORAL at 08:31

## 2022-03-11 RX ADMIN — POTASSIUM CHLORIDE 20 MEQ: 1500 TABLET, EXTENDED RELEASE ORAL at 08:31

## 2022-03-11 RX ADMIN — HYDROMORPHONE HYDROCHLORIDE 0.5 MG: 1 INJECTION, SOLUTION INTRAMUSCULAR; INTRAVENOUS; SUBCUTANEOUS at 20:15

## 2022-03-11 RX ADMIN — BUSPIRONE HYDROCHLORIDE 20 MG: 10 TABLET ORAL at 08:31

## 2022-03-11 RX ADMIN — OXYCODONE HYDROCHLORIDE 10 MG: 10 TABLET ORAL at 21:47

## 2022-03-11 RX ADMIN — LITHIUM CARBONATE 300 MG: 300 TABLET, FILM COATED, EXTENDED RELEASE ORAL at 21:48

## 2022-03-11 RX ADMIN — PAROXETINE 60 MG: 20 TABLET, FILM COATED ORAL at 08:32

## 2022-03-11 RX ADMIN — ENOXAPARIN SODIUM 40 MG: 40 INJECTION SUBCUTANEOUS at 03:52

## 2022-03-11 RX ADMIN — PREGABALIN 150 MG: 75 CAPSULE ORAL at 08:32

## 2022-03-11 RX ADMIN — HYDROMORPHONE HYDROCHLORIDE 0.5 MG: 1 INJECTION, SOLUTION INTRAMUSCULAR; INTRAVENOUS; SUBCUTANEOUS at 03:01

## 2022-03-11 RX ADMIN — POTASSIUM CHLORIDE 40 MEQ: 1500 TABLET, EXTENDED RELEASE ORAL at 11:26

## 2022-03-11 RX ADMIN — PROPRANOLOL HYDROCHLORIDE 20 MG: 20 TABLET ORAL at 08:30

## 2022-03-11 RX ADMIN — BUSPIRONE HYDROCHLORIDE 20 MG: 10 TABLET ORAL at 21:47

## 2022-03-11 RX ADMIN — ASPIRIN 81 MG CHEWABLE TABLET 81 MG: 81 TABLET CHEWABLE at 08:31

## 2022-03-11 RX ADMIN — ACETAMINOPHEN 325MG 975 MG: 325 TABLET ORAL at 21:47

## 2022-03-11 RX ADMIN — QUETIAPINE FUMARATE 50 MG: 25 TABLET ORAL at 21:47

## 2022-03-11 RX ADMIN — SENNOSIDES 17.2 MG: 8.6 TABLET ORAL at 21:47

## 2022-03-11 RX ADMIN — OXYCODONE HYDROCHLORIDE AND ACETAMINOPHEN 500 MG: 500 TABLET ORAL at 08:32

## 2022-03-11 RX ADMIN — PROPRANOLOL HYDROCHLORIDE 20 MG: 20 TABLET ORAL at 11:26

## 2022-03-11 RX ADMIN — POLYETHYLENE GLYCOL 3350 17 G: 17 POWDER, FOR SOLUTION ORAL at 08:30

## 2022-03-11 RX ADMIN — BUSPIRONE HYDROCHLORIDE 20 MG: 10 TABLET ORAL at 15:26

## 2022-03-11 RX ADMIN — ACETAMINOPHEN 325MG 975 MG: 325 TABLET ORAL at 12:53

## 2022-03-11 RX ADMIN — ATORVASTATIN CALCIUM 40 MG: 40 TABLET, FILM COATED ORAL at 15:26

## 2022-03-11 RX ADMIN — PANTOPRAZOLE SODIUM 20 MG: 20 TABLET, DELAYED RELEASE ORAL at 05:14

## 2022-03-11 RX ADMIN — PRAZOSIN HYDROCHLORIDE 2 MG: 2 CAPSULE ORAL at 21:48

## 2022-03-11 NOTE — OCCUPATIONAL THERAPY NOTE
Occupational Therapy Treatment Note    Name:  Jodie Lisa   MRN:   [id number]  Age:     60 y o    Patient Active Problem List   Diagnosis    Right knee pain    Chronic pain disorder    Lumbar radiculopathy    Lumbar spondylosis    Fibromyalgia    Lumbar disc disease with radiculopathy    Spinal stenosis of lumbar region with neurogenic claudication    Pain in joint, shoulder region    Intractable low back pain    Bilateral hip pain    Bipolar II disorder ([address] )    Cognitive disorder    Esophageal reflux    Fatigue    Female pelvic pain    Generalized anxiety disorder    Essential hypertension    History of hypokalemia    Insomnia    Major depressive disorder, recurrent episode, moderate degree (HCC)    Migraine    Opioid dependence (HCC)    Osteoarthritis of both hips    Osteoarthritis of knee    Overactive bladder    Left hip pain    Panic disorder without agoraphobia    Post traumatic stress disorder    Primary localized osteoarthritis of both knees    Recent unexplained weight loss    Sacroiliitis (HCC)    Tremor    Urinary incontinence    Vitamin D deficiency    Post laminectomy syndrome    Encounter for long-term use of opiate analgesic    Family history of colorectal cancer    Complaint related to dreams    MIMI (obstructive sleep apnea)    Tardive dyskinesia    Primary osteoarthritis of both knees    Patellofemoral disorder of both knees    Rash    Superficial bacterial infection of skin    Scar of back    Impaired skin integrity associated with surgical incision    Status post insertion of spinal cord stimulator    Ambulatory dysfunction    Dysphagia    Arthritis of right knee    Multiple closed fractures of metatarsal bone of right foot    Chronic back pain    Nausea    Encephalopathy    History of CVA (cerebrovascular accident)    Medical clearance for psychiatric admission    Mixed hyperlipidemia    Leukopenia    Preoperative evaluation to rule out surgical contraindication    CVA (cerebral vascular accident) ([facility] [address]    S/P total knee arthroplasty, right     Primary osteoarthritis of right knee [M17 11]      Subjective/Goals: patient limited by pain and cognition with no specific goals stated  Patient is difficult to understand at present with MD indicating a change in medication possible    Vitals: stable     Pain: no numerical pain level noted however pain a large limitation and barrier to progress at this time along with other medical/phych issues     Treatment Time: (1498-1441) 40 minutes    Assessment:  Patient seen this date for OT with focus on goals as set by OTR  Patient agreeable to skilled OT session with focus on ADL's (bathing, dressing, toileting), Transfers (STS, SPT), Standing tolerance/balance, Strength/ROM/HEP, Cognition, Activity tolerance, Education on safety, fall prevention and energy conservation techniques, Bathroom/kitchen/home mobility and Fine motor coordination/hand strength  Barriers to treatment include fatigue, pain, cognition, safety, home environment (management in/out or throughout home), social/family support, medical complexities, decreased strength/coordination, balance , activity tolerance and standing tolerance  Patient educated on safe functional transfers techniques, the appropriate use of AE/DME to improve functional performance, and activity modification techniques for energy conservation  Plans for d/c are post acute rehab- possible longer term rehab needed based on session this date and other functional/social barriers at this time  Patient is making gains toward OT goals with continued OT recommended at this time to max tolerance, safety and function for appropriate d/c planning    At end of session patient remains in bed with all needs within reach        03/11/22 1410   OT Last Visit   OT Visit Date 03/11/22   Note Type   Note Type Treatment   Restrictions/Precautions   Weight Bearing Precautions Per Order Yes   RLE Weight Bearing Per Order WBAT   Other Precautions Fall Risk;Pain; Chair Alarm; Bed Alarm;Cognitive   ADL   Eating Comments patient with bilateral UE tremor impacting function   Grooming Comments attempted to manage hair and apply hair tie but unsuccessful needing max assist     UB Bathing Comments unable to assess   LB Bathing Comments unable to assess    UB Dressing Comments unable to assess    LB Dressing Comments unable to assess    Toileting Comments attempted toileting however patient unable to go- dependent for CM and hygiene    Functional Standing Tolerance   Time 2 min average   Activity mobility and transfers  Needs increased cues and support with patient's cognition, pain, and tremor    Bed Mobility   Supine to Sit 2  Maximal assistance   Additional items Assist x 2;LE management;Verbal cues; Increased time required; Bedrails;HOB elevated   Sit to Supine 2  Maximal assistance   Additional items Assist x 2; Increased time required; Bedrails;LE management;Verbal cues   Transfers   Sit to Stand 3  Moderate assistance   Additional items Assist x 2; Increased time required;Armrests; Verbal cues   Stand pivot 2  Maximal assistance   Additional items Assist x 2; Increased time required;Verbal cues; Impulsive   Toilet transfer 2  Maximal assistance   Additional items Assist x 2; Increased time required;Verbal cues   Additional Comments increased time and cues needed- increased pain needing calming techniques and encouragement    Functional Mobility   Functional Mobility 2  Maximal assistance   Additional Comments x2 rollator with close w/c follow needed and recommended at this time for patient and staff safety    Therapeutic Exercise - ROM   UE-ROM   (UE tremor bilaterally )   Cognition   Overall Cognitive Status Impaired   Arousal/Participation Alert; Cooperative   Attention Attends with cues to redirect   Orientation Level Oriented to person;Oriented to place; Disoriented to time;Disoriented to situation Memory Decreased short term memory   Following Commands Follows one step commands inconsistently   Comments difficulty understanding patient with slurred speech, decreased insight and safety overall  Additional Activities   Additional Activities Comments poor balance static    Activity Tolerance   Activity Tolerance Patient limited by pain  (cognition and fatigue )   Assessment   Assessment see note   Plan   Treatment Interventions Functional transfer training;Energy conservation; Activityengagement;UE strengthening/ROM   Goal Expiration Date 03/24/22   OT Treatment Day 1   OT Frequency 3-5x/wk   Recommendation   OT Discharge Recommendation Post acute rehabilitation services   Equipment Recommended Bedside commode   Additional Comments  suspected to need a longer term rehab stay based on tolerance, safety and family support    AM-PAC Daily Activity Inpatient   Lower Body Dressing 2   Bathing 2   Toileting 1   Upper Body Dressing 2   Grooming 3   Eating 3   Daily Activity Raw Score 13   Daily Activity Standardized Score (Calc for Raw Score >=11) 32 03   Cristóbal Kemp  3/11/2022

## 2022-03-11 NOTE — RESULT ENCOUNTER NOTE
Patient's lithium level shows that she essentially is not taking it  I had discussed medication compliance with her during her pre-operative evaluation  I recommended patient follow-up with psych to address her numerous psychotropic medications

## 2022-03-11 NOTE — PROGRESS NOTES
Progress Note - Orthopedics   Samantha QUINONEZ Sindi Dean 60 y o  female MRN: [id number]  Unit/Bed#: E2 -02 Encounter: [id number]    Assessment:  60 y o  female s/p right total knee arthroplasty POD 2, tardive dyskinesia    Plan:  · Tardive dyskinesia- patient has noted her dyskinesia  She notes worsening of her speech and word-finding ability  Did speak with [facility] who recommend psych consult for recommendations  · Dressing change completed  Continue daily dry dressing changes  · Pain control prn  · PT/OT- WBAT right LE   · Lovenox/TEDs/SCDs for DVT prophylaxis  · DC planning- home with home health versus rehab pending  Subjective: Pt S&E  Resting comfortably in bedside chair  Patient is visibly frustrated as she is unable to articulate how she is feeling  She is only able to say a few words at a time  Denies pain in the knee  States she was lightheaded when she stood this morning  Vitals: Blood pressure 130/94, pulse 95, temperature (!) 97 2 °F (36 2 °C), temperature source Temporal, resp  rate 20, height 5' (1 524 m), weight 81 2 kg (179 lb), SpO2 94 %, not currently breastfeeding  ,Body mass index is 34 96 kg/m²        Intake/Output Summary (Last 24 hours) at 3/11/2022 1109  Last data filed at 3/11/2022 1102  Gross per 24 hour   Intake 240 ml   Output 650 ml   Net -410 ml       Invasive Devices  Report    Peripheral Intravenous Line            Peripheral IV 03/10/22 Distal;Left;Upper;Ventral (anterior) Arm <1 day                Ortho Exam: rightLE:  Drsg:  C/D/I, sensation grossly intact L4, L5, S1, palpable pedal pulse, EHL/AT/GS intact    Lab, Imaging and other studies:   CBC:   Lab Results   Component Value Date    WBC 6 71 03/11/2022    HGB 10 1 (L) 03/11/2022    HCT 29 4 (L) 03/11/2022    MCV 87 03/11/2022     03/11/2022    MCH 29 9 03/11/2022    MCHC 34 4 03/11/2022    RDW 13 9 03/11/2022    MPV 9 3 03/11/2022     CMP:   Lab Results   Component Value Date    SODIUM 138 03/11/2022     03/11/2022    CO2 26 03/11/2022    BUN 13 03/11/2022    CREATININE 0 95 03/11/2022    CALCIUM 7 9 (L) 03/11/2022    EGFR 66 03/11/2022

## 2022-03-11 NOTE — PHYSICAL THERAPY NOTE
Physical Therapy Treatment Note     03/11/22 1011   PT Last Visit   PT Visit Date 03/11/22   Note Type   Note Type BID visit/treatment   Pain Assessment   Pain Assessment Tool 0-10   Pain Score 10 - Worst Possible Pain   Restrictions/Precautions   Weight Bearing Precautions Per Order Yes   RLE Weight Bearing Per Order WBAT   Other Precautions Fall Risk;Pain; Chair Alarm;Cognitive;WBS   General   Chart Reviewed Yes   Subjective   Subjective Pt  in bed sleeping upon entry  Pt  agreeable to PT  Reported pain in the LLE as well  Bed Mobility   Supine to Sit 4  Minimal assistance   Additional items Assist x 1;HOB elevated; Increased time required;Verbal cues   Transfers   Sit to Stand 2  Maximal assistance   Additional items Assist x 1; Increased time required;Verbal cues;Armrests   Stand to Sit 2  Maximal assistance   Additional items Assist x 1;Verbal cues; Increased time required;Armrests   Stand pivot 3  Moderate assistance   Additional items Assist x 2; Increased time required;Verbal cues   Ambulation/Elevation   Gait pattern Excessively slow;Knees flexed; Foward flexed; Inconsistent zack;Decreased foot clearance; Forward Flexion; Improper Weight shift; Antalgic;Decreased R stance;Decreased heel strike   Gait Assistance 2  Maximal assist  (Max x 1 + Min A x 1)   Additional items Assist x 2;Verbal cues   Assistive Device Rolling walker   Distance 14ft x 2   Balance   Static Sitting Fair +   Dynamic Sitting Fair   Static Standing Poor +   Dynamic Standing Poor -   Ambulatory Poor -   Endurance Deficit   Endurance Deficit No   Activity Tolerance   Activity Tolerance Patient tolerated treatment well;Patient limited by pain   Nurse Made Aware yes   Assessment   Prognosis Fair   Problem List Decreased strength;Decreased range of motion; Impaired balance;Decreased mobility; Impaired judgement;Decreased cognition;Decreased coordination;Decreased safety awareness;Pain;Orthopedic restrictions; Obesity   Assessment Pt  needed decreased assist for for supine to sit transfer this session  Pt  needed extended education in the importance of mobility and for technqiues for transfer and ambulation  Pt  noted with 90deg R knee flexion in sitting  Pt  reported pain in the LLE as well with mobility  B/L knee buckling noted with excess forward trunk flexion during ambulation  max A on the R and Min A on the L provided for ambulation for safety  Noted impaired speech and poor carry over of cues noted  pt  cannot follow directions consistently well  However occ  steadier steps noted during ambulation with max cues for focusing on tasks and technqiues  Pt  needed cues not to take hands off the RW during ambulation and also needed redirection as patient was distracted during ambulation  Pt  positioned seated on bedside chair post session with alarm engaged and all needs within reach  Pt  asked for assist calling her daughter however unable to reach any number daughter with any phone number provided by the patient/ Pt  reported the contact number we have in epic is an old number  Pt  requries extensive assist for all levels of mobility  Chair follow was given for ambulation due to increased risk of follow along with physical A x 2 for ambulation  Pt  continues to be a high risk of fall and needs assist for all mobility  Noted ataxic movements/tremors of LEs and UEs  Pt  unable to hold things in her hand firmly  Pt  dropped her phone and reported she drops food a lot while eating due to same  Pt  is recommended to be DC to rehab prior to returning home due to increased risk of fall, limited mobility, requires increased assist, and impaired cognition  Barriers to Discharge Decreased caregiver support; Inaccessible home environment   Goals   Patient Goals None reported   STG Expiration Date 03/19/22   PT Treatment Day 4   Plan   Treatment/Interventions Functional transfer training;Patient/family training;Equipment eval/education; Bed mobility;Gait training;Spoke to nursing;Spoke to case management   Progress Progressing toward goals   PT Frequency Twice a day   Recommendation   PT Discharge Recommendation Post acute rehabilitation services   AM-PAC Basic Mobility Inpatient   Turning in Bed Without Bedrails 2   Lying on Back to Sitting on Edge of Flat Bed 3   Moving Bed to Chair 1   Standing Up From Chair 2   Walk in Room 2   Climb 3-5 Stairs 1   Basic Mobility Inpatient Raw Score 11   Basic Mobility Standardized Score 30 25   Highest Level Of Mobility   -[facility] Goal 4: Move to chair/commode   -[facility] Highest Level of Mobility 5: Stand (1 or more minutes)   -HL Goal Achieved Yes   End of Consult   Patient Position at End of Consult Seated edge of bed; All needs within reach;Bed/Chair alarm activated         Chris Vera PTA    An AM-PAC basic mobility standardized score less than 42 9 suggest the patient may benefit from discharge to post-acute rehab services

## 2022-03-11 NOTE — NURSING NOTE
Earlier in this RN's shift, RN attempted to call pt's daughter twice with no response regarding her bringing in home medications

## 2022-03-11 NOTE — PHYSICAL THERAPY NOTE
Physical Therapy Treatment Note       03/11/22 1404   PT Last Visit   PT Visit Date 03/11/22   Note Type   Note Type Treatment   Pain Assessment   Pain Assessment Tool 0-10   Pain Score 10 - Worst Possible Pain   Pain Location/Orientation Location: Knee;Orientation: Right   Restrictions/Precautions   Weight Bearing Precautions Per Order Yes   RLE Weight Bearing Per Order WBAT   Other Precautions Fall Risk;Pain;WBS; Bed Alarm; Chair Alarm;Cognitive  (Dyskinesia)   General   Chart Reviewed Yes   Family/Caregiver Present No   Cognition   Overall Cognitive Status Impaired   Subjective   Subjective Pt  in bed sleeping upon entry  Pt  agreeable to PT   Bed Mobility   Supine to Sit 2  Maximal assistance   Additional items Assist x 2;LE management; Increased time required;HOB elevated; Bedrails   Sit to Supine 2  Maximal assistance   Additional items Assist x 2; Increased time required;LE management;Verbal cues   Transfers   Sit to Stand 3  Moderate assistance   Additional items Assist x 2; Increased time required;Verbal cues;Armrests   Stand to Sit 3  Moderate assistance   Additional items Assist x 2; Increased time required;Verbal cues   Stand pivot 2  Maximal assistance   Additional items Assist x 2; Increased time required;Verbal cues   Toilet transfer 2  Maximal assistance   Additional items Assist x 2; Increased time required;Standard toilet;Verbal cues  (grab bar)   Ambulation/Elevation   Gait pattern Improper Weight shift;Decreased foot clearance;Decreased R stance;Poor UE support;R Knee Analilia; Foward flexed; Inconsistent zack;Knees flexed; Excessively slow;Decreased heel strike;Decreased toe off; Short stride; Ataxia   Gait Assistance 2  Maximal assist   Additional items Assist x 2;Assist x 3;Verbal cues  (3rd A for chair follow)   Assistive Device Rolling walker   Distance 10ft   Balance   Static Sitting Fair +   Dynamic Sitting Fair   Static Standing Poor +   Dynamic Standing Poor -   Ambulatory Zero   Endurance Deficit Endurance Deficit No   Activity Tolerance   Activity Tolerance Patient tolerated treatment well;Patient limited by pain; Other (Comment)  (imapired cognition)   Nurse Made Aware Yes   Exercises   TKR Supine;Bilateral;AAROM;20 reps;10 reps   Assessment   Prognosis Fair   Problem List Decreased strength;Decreased range of motion;Decreased endurance; Impaired balance;Decreased mobility; Decreased coordination;Decreased cognition; Impaired judgement;Decreased safety awareness;Pain;Orthopedic restrictions   Assessment Pt  needed max A x 2 for all mobility except for LE ROM in supine  pt  needed max cues for techniques however unable to follow it well  Noted ataxic movements of extremities and poor UE WBing through RW during ambulation  pt  able to take only very few good steps with Max  A x 2 and max cues  R knee buckling and b/l LE instability and weakness noted  pt  needed assist in holding things in her hands  Needed max cueing for hand placement occ physical assist in placing the UEs given  Poor motor planning, coordination, cognition, safety, inability to follow cues, safety awareness and LE insatbility puts patient at a high risk of fall  pt  requires extensive assist of 2 for all WBing mobility  Will continue to recommend rehab at [facility] to address the mobility, strength deficits  Barriers to Discharge Decreased caregiver support; Inaccessible home environment   Goals   Patient Goals None reported   STG Expiration Date 03/19/22   PT Treatment Day 5   Plan   Treatment/Interventions Functional transfer training;LE strengthening/ROM; Therapeutic exercise;Patient/family training;Equipment eval/education;Gait training;Bed mobility;Spoke to nursing;OT;Spoke to advanced practitioner   Progress No functional improvements   PT Frequency Twice a day   Recommendation   PT Discharge Recommendation Post acute rehabilitation services   Equipment Recommended Other (Comment); Wheelchair;Walker  (TBD)   AM-PAC Basic Mobility Inpatient Turning in Bed Without Bedrails 2   Lying on Back to Sitting on Edge of Flat Bed 3   Moving Bed to Chair 1   Standing Up From Chair 2   Walk in Room 2   Climb 3-5 Stairs 1   Basic Mobility Inpatient Raw Score 11   Basic Mobility Standardized Score 30 25   Highest Level Of Mobility   -[facility] Goal 4: Move to chair/commode   [facility]-HLM Highest Level of Mobility 5: Stand (1 or more minutes)   -[facility] Goal Achieved Yes   End of Consult   Patient Position at End of Consult Supine; All needs within reach;Bed/Chair alarm activated     Chris Vera PTA    An AM-PAC basic mobility standardized score less than 42 9 suggest the patient may benefit from discharge to post-acute rehab services

## 2022-03-11 NOTE — PLAN OF CARE
Problem: OCCUPATIONAL THERAPY ADULT  Goal: Performs self-care activities at highest level of function for planned discharge setting  See evaluation for individualized goals  Description: Treatment Interventions: ADL retraining,Functional transfer training,UE strengthening/ROM,Endurance training,Cognitive reorientation,Patient/family training,Equipment evaluation/education,Compensatory technique education,Continued evaluation  Equipment Recommended: Bedside commode       See flowsheet documentation for full assessment, interventions and recommendations     Outcome: Progressing  Note: Limitation: Decreased ADL status,Decreased UE strength,Decreased Safe judgement during ADL,Decreased cognition,Decreased endurance,Decreased high-level ADLs  Prognosis: Good  Assessment:  see note  OT Discharge Recommendation: Post acute rehabilitation services

## 2022-03-11 NOTE — CASE MANAGEMENT
Case Management Discharge Planning Note    Patient name Samantha Ortiz  Location [place] 2 /E2 -* MRN [id number]  : 1965 Date 3/11/2022       Current Admission Date: 3/9/2022  Current Admission Diagnosis:S/P total knee arthroplasty, right   Patient Active Problem List    Diagnosis Date Noted    S/P total knee arthroplasty, right 03/10/2022    CVA [facility]cerebral vascular accident) ([facility] [address] ) 2022    Preoperative evaluation to rule out surgical contraindication 2022    Leukopenia 2021    Mixed hyperlipidemia 2021    Medical clearance for psychiatric admission 2021    History of CVA (cerebrovascular accident) 2021    Encephalopathy 2021    Nausea 2021    Multiple closed fractures of metatarsal bone of right foot 2021    Chronic back pain 2021    Arthritis of right knee 10/06/2020    Dysphagia 2020    Ambulatory dysfunction 2020    Status post insertion of spinal cord stimulator 2020    Superficial bacterial infection of skin 2020    Scar of back 2020    Impaired skin integrity associated with surgical incision 2020    Rash 2020    Primary osteoarthritis of both knees 2020    Patellofemoral disorder of both knees 2020    Tardive dyskinesia 2020    MIMI (obstructive sleep apnea)     Complaint related to dreams 2020    Family history of colorectal cancer 2019    Encounter for long-term use of opiate analgesic 2019    Post laminectomy syndrome 10/07/2019    Lumbar disc disease with radiculopathy 2018    Spinal stenosis of lumbar region with neurogenic claudication 2018    Lumbar radiculopathy 2017    Bilateral hip pain 2017    Primary localized osteoarthritis of both knees 2017    Chronic pain disorder 2017    Opioid dependence ([facility] [address] ) 2017    Sacroiliitis ([facility] [address] ) 2017    Lumbar spondylosis 10/31/2016    Osteoarthritis of both hips 10/31/2016    Generalized anxiety disorder 10/26/2016    Major depressive disorder, recurrent episode, moderate degree ([facility] [address] ) 09/08/2016    Right knee pain 06/06/2016    Recent unexplained weight loss 06/06/2016    Fatigue 10/26/2015    Bipolar II disorder ([facility] [address] ) 06/18/2015    Panic disorder without agoraphobia 06/18/2015    Post traumatic stress disorder 06/18/2015    Left hip pain 07/25/2014    Intractable low back pain 06/16/2014    Tremor 06/12/2014    Migraine 05/27/2014    Esophageal reflux 05/01/2014    Cognitive disorder 04/18/2014    Female pelvic pain 10/30/2013    Pain in joint, shoulder region 10/28/2013    Overactive bladder 09/26/2013    Osteoarthritis of knee 02/20/2013    Insomnia 01/31/2013    History of hypokalemia 01/03/2013    Urinary incontinence 09/24/2012    Vitamin D deficiency 09/18/2012    Fibromyalgia 09/14/2012    Essential hypertension 09/14/2012      LOS (days): 1  Geometric Mean LOS (GMLOS) (days):   Days to GMLOS:     OBJECTIVE:  Risk of Unplanned Readmission Score: 40      Current admission status: Inpatient   Preferred Pharmacy:   [address] [place] [address]  Phone: [phone] Fax: [facility] , [address]  Phone: [phone] Fax: [phone]    Primary Care Provider: Gabriel Alicia DO    Primary Insurance: Konrad Gin MEDICARE [facility]  Secondary Insurance: Davey Capellan    DISCHARGE DETAILS:    Other Referral/Resources/Interventions Provided:  Interventions: Short Term Rehab,ICM,Other (Specify)  Referral Comments: Pt in need of STR at this time  CM attempted ot make contact with both of pt's emergency contacts but both calls went straight to VM  CM did leave VM's requesting a call back   CM also attempted to call pt's ICM and was also only able to leave a   CM called [facility] to see if pt's optioning paperwork was still up to date but it is not  Pt had a recent psych stay back in August of 2021  CM will have to resubmit paperwork to the county to determine if pt is clinically eligible to be placed in a SNF  CM will start the optioning process today  Rituxan Counseling:  I discussed with the patient the risks of Rituxan infusions. Side effects can include infusion reactions, severe drug rashes including mucocutaneous reactions, reactivation of latent hepatitis and other infections and rarely progressive multifocal leukoencephalopathy.  All of the patient's questions and concerns were addressed.

## 2022-03-11 NOTE — PLAN OF CARE
Problem: MOBILITY - ADULT  Goal: Maintain or return to baseline ADL function  Description: INTERVENTIONS:  -  Assess patient's ability to carry out ADLs; assess patient's baseline for ADL function and identify physical deficits which impact ability to perform ADLs (bathing, care of mouth/teeth, toileting, grooming, dressing, etc )  - Assess/evaluate cause of self-care deficits   - Assess range of motion  - Assess patient's mobility; develop plan if impaired  - Assess patient's need for assistive devices and provide as appropriate  - Encourage maximum independence but intervene and supervise when necessary  - Involve family in performance of ADLs  - Assess for home care needs following discharge   - Consider OT consult to assist with ADL evaluation and planning for discharge  - Provide patient education as appropriate  Outcome: Progressing  Goal: Maintains/Returns to pre admission functional level  Description: INTERVENTIONS:  - Perform BMAT or MOVE assessment daily    - Set and communicate daily mobility goal to care team and patient/family/caregiver  - Collaborate with rehabilitation services on mobility goals if consulted  - Perform Range of Motion 3 times a day  - Reposition patient every 2 hours    - Dangle patient 3 times a day  - Stand patient 3 times a day  - Ambulate patient 3 times a day  - Out of bed to chair 3 times a day   - Out of bed for meals 3 times a day  - Out of bed for toileting  - Record patient progress and toleration of activity level   Outcome: Progressing     Problem: Potential for Falls  Goal: Patient will remain free of falls  Description: INTERVENTIONS:  - Educate patient/family on patient safety including physical limitations  - Instruct patient to call for assistance with activity   - Consult OT/PT to assist with strengthening/mobility   - Keep Call bell within reach  - Keep bed low and locked with side rails adjusted as appropriate  - Keep care items and personal belongings within reach  - Initiate and maintain comfort rounds  - Make Fall Risk Sign visible to staff  - Offer Toileting every 2 Hours, in advance of need  - Initiate/Maintain bed alarm  - Obtain necessary fall risk management equipment: bed alarm  - Apply yellow socks and bracelet for high fall risk patients  - Consider moving patient to room near nurses station  Outcome: Progressing     Problem: PAIN - ADULT  Goal: Verbalizes/displays adequate comfort level or baseline comfort level  Description: Interventions:  - Encourage patient to monitor pain and request assistance  - Assess pain using appropriate pain scale  - Administer analgesics based on type and severity of pain and evaluate response  - Implement non-pharmacological measures as appropriate and evaluate response  - Consider cultural and social influences on pain and pain management  - Notify physician/advanced practitioner if interventions unsuccessful or patient reports new pain  Outcome: Progressing     Problem: DISCHARGE PLANNING  Goal: Discharge to home or other facility with appropriate resources  Description: INTERVENTIONS:  - Identify barriers to discharge w/patient and caregiver  - Arrange for needed discharge resources and transportation as appropriate  - Identify discharge learning needs (meds, wound care, etc )  - Arrange for interpretive services to assist at discharge as needed  - Refer to Case Management Department for coordinating discharge planning if the patient needs post-hospital services based on physician/advanced practitioner order or complex needs related to functional status, cognitive ability, or social support system  Outcome: Progressing     Problem: Knowledge Deficit  Goal: Patient/family/caregiver demonstrates understanding of disease process, treatment plan, medications, and discharge instructions  Description: Complete learning assessment and assess knowledge base    Interventions:  - Provide teaching at level of understanding  - Provide teaching via preferred learning methods  Outcome: Progressing     Problem: Prexisting or High Potential for Compromised Skin Integrity  Goal: Skin integrity is maintained or improved  Description: INTERVENTIONS:  - Identify patients at risk for skin breakdown  - Assess and monitor skin integrity  - Assess and monitor nutrition and hydration status  - Monitor labs   - Assess for incontinence   - Turn and reposition patient  - Assist with mobility/ambulation  - Relieve pressure over bony prominences  - Avoid friction and shearing  - Provide appropriate hygiene as needed including keeping skin clean and dry  - Evaluate need for skin moisturizer/barrier cream  - Collaborate with interdisciplinary team   - Patient/family teaching  - Consider wound care consult   Outcome: Progressing     Problem: NEUROSENSORY - ADULT  Goal: Achieves stable or improved neurological status  Description: INTERVENTIONS  - Monitor and report changes in neurological status  - Monitor vital signs such as temperature, blood pressure, glucose, and any other labs ordered   - Initiate measures to prevent increased intracranial pressure  - Monitor for seizure activity and implement precautions if appropriate      Outcome: Progressing  Goal: Achieves maximal functionality and self care  Description: INTERVENTIONS  - Monitor swallowing and airway patency with patient fatigue and changes in neurological status  - Encourage and assist patient to increase activity and self care     - Encourage visually impaired, hearing impaired and aphasic patients to use assistive/communication devices  Outcome: Progressing     Problem: SKIN/TISSUE INTEGRITY - ADULT  Goal: Incision(s), wounds(s) or drain site(s) healing without S/S of infection  Description: INTERVENTIONS  - Assess and document dressing, incision, wound bed, drain sites and surrounding tissue  - Provide patient and family education  - Perform skin care/dressing changes every shift  Outcome: Progressing Problem: MUSCULOSKELETAL - ADULT  Goal: Maintain or return mobility to safest level of function  Description: INTERVENTIONS:  - Assess patient's ability to carry out ADLs; assess patient's baseline for ADL function and identify physical deficits which impact ability to perform ADLs (bathing, care of mouth/teeth, toileting, grooming, dressing, etc )  - Assess/evaluate cause of self-care deficits   - Assess range of motion  - Assess patient's mobility  - Assess patient's need for assistive devices and provide as appropriate  - Encourage maximum independence but intervene and supervise when necessary  - Involve family in performance of ADLs  - Assess for home care needs following discharge   - Consider OT consult to assist with ADL evaluation and planning for discharge  - Provide patient education as appropriate  Outcome: Progressing  Goal: Maintain proper alignment of affected body part  Description: INTERVENTIONS:  - Support, maintain and protect limb and body alignment  - Provide patient/ family with appropriate education  Outcome: Progressing

## 2022-03-11 NOTE — UTILIZATION REVIEW
Elective Surgical Continued Stay Review    Please see initial review completed on 3/10/22 by SHARMILA Staley RN  Date: 3/11/22     POD#: 2, tardive dyskinesia  Current Patient Class: inpatient  Current Level of Care: med surg    Assessment/Plan: 60 y o  female, initial surgery date 3/10/22  Pt c/o dizziness after standing this am   Pt frustrated dt worsening of speech and word finding ability  Psych consult placed  Dsg changed completed today, continue qd dsg changes  Sensation grossly intact L4, L5, S1, palpable pedal pulse  Continue pain control, PT/OT WBAT RLE, continue lovenox dvt ppx and scds, per PT recommendation post acute rehab placement      Pertinent Labs/Diagnostic Results:   Results from last 7 days   Lab Units 03/09/22  0959   SARS-COV-2  Negative     Results from last 7 days   Lab Units 03/11/22  0537 03/10/22  0515 03/05/22  0854   WBC Thousand/uL 6 71 10 11 3 70*   HEMOGLOBIN g/dL 10 1* 11 6 14 3   HEMATOCRIT % 29 4* 34 4* 42 9   PLATELETS Thousands/uL 236 312 371   NEUTROS ABS Thousands/µL  --   --  2 50     Results from last 7 days   Lab Units 03/11/22  0537 03/10/22  0515 03/09/22  0951 03/05/22  0854   SODIUM mmol/L 138 138 142 142   POTASSIUM mmol/L 3 2* 4 0 3 7 3 1*   CHLORIDE mmol/L 104 105 105 105   CO2 mmol/L 26 26 26 29   ANION GAP mmol/L 8 7 11 8   BUN mg/dL 13 7 5 6   CREATININE mg/dL 0 95 0 70 0 81 0 75   EGFR ml/min/1 73sq m 66 95 80 88   CALCIUM mg/dL 7 9* 8 0* 8 7 8 6     Results from last 7 days   Lab Units 03/05/22  0854   AST U/L 20   ALT U/L 16   ALK PHOS U/L 92   TOTAL PROTEIN g/dL 7 8   ALBUMIN g/dL 4 4   TOTAL BILIRUBIN mg/dL 1 09     Results from last 7 days   Lab Units 03/11/22  0537 03/10/22  0515 03/09/22  0951   GLUCOSE RANDOM mg/dL 118 110 103     Results from last 7 days   Lab Units 03/05/22  0854   HEMOGLOBIN A1C % 4 7   EAG mg/dl 88     Results from last 7 days   Lab Units 03/05/22  0854   PROTIME seconds 13 0   INR  1 02   PTT seconds 31     Results from last 7 days   Lab Units 03/05/22  0854   FERRITIN ng/mL 119     Results from last 7 days   Lab Units 03/05/22  0854   CRP mg/L 7 5     Results from last 7 days   Lab Units 03/05/22  0854   CLARITY UA  Clear   COLOR UA  Yellow   SPEC GRAV UA  1 010   PH UA  7 0   GLUCOSE UA mg/dl Negative   KETONES UA mg/dl Negative   BLOOD UA  10 0*   PROTEIN UA mg/dl Negative   NITRITE UA  Negative   BILIRUBIN UA  Negative   UROBILINOGEN UA mg/dL Negative   LEUKOCYTES UA  100 0*   WBC UA /hpf 2-4   RBC UA /hpf 0-1   BACTERIA UA /hpf Moderate*   EPITHELIAL CELLS WET PREP /hpf Moderate*   MUCUS THREADS  Occasional*     Results from last 7 days   Lab Units 03/09/22  0959   INFLUENZA A PCR  Negative   INFLUENZA B PCR  Negative   RSV PCR  Negative     Vital Signs:   Date/Time Temp Pulse Resp BP MAP (mmHg) SpO2 Calculated FIO2 (%) - Nasal Cannula Nasal Cannula O2 Flow Rate (L/min) O2 Device   03/11/22 1125 -- 76 -- 135/90 -- -- -- -- --   03/11/22 0803 97 2 °F (36 2 °C) Abnormal  95 20 130/94 108 94 % -- -- None (Room air)   03/10/22 2332 97 7 °F (36 5 °C) 78 18 123/75 -- 97 % -- -- --   03/10/22 1543 96 °F (35 6 °C) Abnormal  75 17 129/83 -- 92 % -- -- None (Room air)   03/10/22 1227 -- 78 -- 111/65 -- -- -- -- --   03/10/22 0720 96 2 °F (35 7 °C) Abnormal  66 18 125/79 -- 97 % -- -- None (Room air)   03/10/22 0341 97 6 °F (36 4 °C) 72 18 107/75 89 94 % -- -- None (Room air)   03/09/22 2223 97 2 °F (36 2 °C) Abnormal  66 18 115/82 87 95 % -- -- None (Room air)   03/09/22 1900 97 5 °F (36 4 °C) 78 18 111/76 88 100 % -- -- None (Room air)   03/09/22 1705 97 4 °F (36 3 °C) Abnormal  70 17 133/90 101 98 % -- -- None (Room air)   03/09/22 1635 97 2 °F (36 2 °C) Abnormal  68 15 136/86 93 100 % 28 2 L/min Nasal cannula   03/09/22 1605 96 8 °F (36 °C) Abnormal  66 15 136/93 102 100 % 28 2 L/min Nasal cannula   03/09/22 1530 -- -- -- -- -- -- 36 4 L/min Nasal cannula   03/09/22 1529 97 4 °F (36 3 °C) Abnormal  62 15 130/63 90 100 % -- -- Nasal cannula   03/09/22 1516 -- 64 17 139/61 88 100 % 36 4 L/min Nasal cannula   03/09/22 1459 -- 76 18 147/86 110 97 % -- -- --   03/09/22 1444 97 2 °F (36 2 °C) Abnormal  62 18 132/74 98 100 % 36 4 L/min Nasal cannula   03/09/22 1102 -- 68 18 141/95 -- 100 % -- -- Nasal cannula   03/09/22 1058 -- 66 18 154/95 -- 98 % -- -- None (Room air)   03/09/22 1035 97 6 °F (36 4 °C) 65 18 114/96 -- 95 % -- -- None (Room air)     Medications:   Scheduled Medications:  acetaminophen, 975 mg, Oral, Q8H [facility]  ascorbic acid, 500 mg, Oral, Daily  aspirin, 81 mg, Oral, Daily  atorvastatin, 40 mg, Oral, Daily With Dinner  busPIRone, 20 mg, Oral, TID  enoxaparin, 40 mg, Subcutaneous, Daily  folic acid, 1 mg, Oral, Daily  lithium carbonate, 300 mg, Oral, HS  pantoprazole, 20 mg, Oral, Early Morning  PARoxetine, 60 mg, Oral, Daily  polyethylene glycol, 17 g, Oral, Daily  potassium chloride, 20 mEq, Oral, Daily  prazosin, 2 mg, Oral, HS  pregabalin, 150 mg, Oral, TID  propranolol, 20 mg, Oral, BID before breakfast/lunch  QUEtiapine, 50 mg, Oral, HS  senna, 2 tablet, Oral, HS  Valbenazine Tosylate, 80 mg, Oral, Daily      Continuous IV Infusions:  lactated ringers, 100 mL/hr, Intravenous, Continuous      PRN Meds:  calcium carbonate, 1,000 mg, Oral, Daily PRN  HYDROmorphone, 0 5 mg, Intravenous, Q4H PRN x4 thus far  hydrOXYzine HCL, 50 mg, Oral, TID PRN  mirtazapine, 7 5 mg, Oral, HS PRN x1 thus far  oxyCODONE, 10 mg, Oral, Q4H PRN x4 thus far  oxyCODONE, 5 mg, Oral, Q4H PRN x2 thus far  promethazine, 25 mg, Intramuscular, Q6H PRN      Discharge Plan: tbd    Network Utilization Review Department  ATTENTION: Please call with any questions or concerns to [phone] and carefully listen to the prompts so that you are directed to the right person   All voicemails are confidential   Seth Ahumada all requests for admission clinical reviews, approved or denied determinations and any other requests to dedicated fax number below belonging to the campus where the patient is receiving treatment   List of dedicated fax numbers for the Facilities:  [address] [place] [address] DENIALS (Administrative/Medical Necessity) [phone]   [address] (Maternity/NICU/Pediatrics) [phone]   [address]  [address] [place] [phone] [place] [address] [address] [place] [address] [phone]

## 2022-03-11 NOTE — UTILIZATION REVIEW
Inpatient Admission Authorization Request   NOTIFICATION OF INPATIENT ADMISSION/INPATIENT AUTHORIZATION REQUEST   SERVICING FACILITY:   [address] [place] [address], [address] [facility]  Tax ID: [id number]  NPI: [id number]  Place of Service: Inpatient [facility]  [address]  Place of Service Code: 24     ATTENDING PROVIDER:  Attending Name and NPI#: Flora Frey [[id number]]  Address: [address] [place] [address], [facility] [address] [facility]  Phone: [phone]     UTILIZATION REVIEW CONTACT:  [facility] Utilization   Network Utilization Review Department  Phone: [phone]  Fax: [phone]  Email: [email or web address]     PHYSICIAN ADVISORY SERVICES:  FOR [id number] REVIEW - MEDICAL NECESSITY DENIAL  Phone: [phone]  Fax: [phone]  Email: [email or web address]     TYPE OF REQUEST:  Inpatient Status     ADMISSION INFORMATION:  ADMISSION DATE/TIME: 3/10/22  4:57 PM  PATIENT DIAGNOSIS CODE/DESCRIPTION:  Primary osteoarthritis of right knee [M17 11]  DISCHARGE DATE/TIME: No discharge date for patient encounter  IMPORTANT INFORMATION:  Please contact the [facility] directly with any questions or concerns regarding this request  Department voicemails are confidential     Send requests for admission clinical reviews, concurrent reviews, approvals, and administrative denials due to lack of clinical to fax [phone]

## 2022-03-11 NOTE — PROGRESS NOTES
[address]  Progress Note - Samantha Manrique 1965, 60 y o  female MRN: [id number]  Unit/Bed#: E2 -02 Encounter: [id number]  Primary Care Provider: Naeem Haas DO   Date and time admitted to hospital: 3/9/2022  9:14 AM    * S/P total knee arthroplasty, right  Assessment & Plan  Patient would history of bilateral knee pain, with worsening right knee arthritis  Status post 3/9 right total knee arthroplasty  Consulted by orthopedic surgery for medical management  PT and OT currently recommend rehab  H/H stable  DVTppx with lovenox, will need 30 days  Follow up with ortho in 2 weeks  Psych consulted for evaluation, will need option for placement    History of CVA (cerebrovascular accident)  Assessment & Plan  Continue aspirin statin    Tardive dyskinesia  Assessment & Plan  Continue Lyrica  Valbenazine not on formulary  Will ask psychiatry to evaluate for med adjustment given worsening tardive dyskinesia    Essential hypertension  Assessment & Plan  Continue inderal and prazosin  BP currently controlled    Generalized anxiety disorder  Assessment & Plan  Continue Paxil and BuSpar    Esophageal reflux  Assessment & Plan  Continue PPI    Bipolar II disorder ([address] )  Assessment & Plan  Follows with Psychiatry outpatient  Continue lithium, and Seroquel bedtime    Lumbar radiculopathy  Assessment & Plan  · s/p thoracolumbar decompression and fusion in 2018 in [place] with subsequent laminectomy syndrome   · S/p thoracic spinal cord stimulator placement in 7/28/20  · Recommend continue follow-up with Neurosurgery outpatient for further evaluation as patient does not use spinal stimulator at this time      Chronic pain disorder  Assessment & Plan  History of opioid dependence, reporting bilateral knee pain  Caution regarding significant opioids for pain control  Recommend topical agents, Voltaren, diclofenac, Lidoderm patches and or creams  Minimize oxycodone as needed to work with physical therapy        VTE Pharmacologic Prophylaxis:   Pharmacologic: Enoxaparin (Lovenox)  Mechanical VTE Prophylaxis in Place: Yes    Patient Centered Rounds: I have performed bedside rounds with nursing staff today  Discussions with Specialists or Other Care Team Provider: ortho    Education and Discussions with Family / Patient: patient    Time Spent for Care: 30 minutes  More than 50% of total time spent on counseling and coordination of care as described above  Current Length of Stay: 1 day(s)    Current Patient Status: Inpatient   Certification Statement: The patient will continue to require additional inpatient hospital stay due to will need STR    Discharge Plan: psych eval, pending placement    Code Status: Level 1 - Full Code      Subjective:   No events overnight  Currently has increased episodes of tongue smacking, dysarthric speech  Unable to obtain patient's home medications Ingrezza  Awaiting psych evaluation  Continues to have some pain appears chronic  Objective:     Vitals:   Temp (24hrs), Av °F (36 1 °C), Min:96 °F (35 6 °C), Max:97 7 °F (36 5 °C)    Temp:  [96 °F (35 6 °C)-97 7 °F (36 5 °C)] 97 2 °F (36 2 °C)  HR:  [75-95] 76  Resp:  [17-20] 20  BP: (123-135)/(75-94) 135/90  SpO2:  [92 %-97 %] 94 %  Body mass index is 34 96 kg/m²  Input and Output Summary (last 24 hours): Intake/Output Summary (Last 24 hours) at 3/11/2022 1319  Last data filed at 3/11/2022 1102  Gross per 24 hour   Intake 240 ml   Output 650 ml   Net -410 ml       Physical Exam:     Physical Exam  Vitals and nursing note reviewed  Constitutional:       Appearance: She is obese  HENT:      Head: Normocephalic and atraumatic  Mouth/Throat:      Comments: Tongue smacking  Eyes:      General: No scleral icterus  Conjunctiva/sclera: Conjunctivae normal    Cardiovascular:      Rate and Rhythm: Normal rate and regular rhythm  Heart sounds: Normal heart sounds     Pulmonary:      Effort: Pulmonary effort is normal  No respiratory distress  Breath sounds: Normal breath sounds  No wheezing  Abdominal:      General: Bowel sounds are normal  There is no distension  Palpations: Abdomen is soft  Tenderness: There is abdominal tenderness  Musculoskeletal:         General: Swelling present  Right lower leg: No edema  Left lower leg: No edema  Skin:     General: Skin is warm and dry  Neurological:      Mental Status: She is alert  Mental status is at baseline  Comments: Unable to speak full sentences, dysarthric         Additional Data:     Labs:    Results from last 7 days   Lab Units 03/11/22  0537 03/10/22  0515 03/05/22  0854   WBC Thousand/uL 6 71   < > 3 70*   HEMOGLOBIN g/dL 10 1*   < > 14 3   HEMATOCRIT % 29 4*   < > 42 9   PLATELETS Thousands/uL 236   < > 371   NEUTROS PCT %  --   --  69*   LYMPHS PCT %  --   --  22*   MONOS PCT %  --   --  6   EOS PCT %  --   --  2    < > = values in this interval not displayed  Results from last 7 days   Lab Units 03/11/22  0537 03/09/22  0951 03/05/22  0854   SODIUM mmol/L 138   < > 142   POTASSIUM mmol/L 3 2*   < > 3 1*   CHLORIDE mmol/L 104   < > 105   CO2 mmol/L 26   < > 29   BUN mg/dL 13   < > 6   CREATININE mg/dL 0 95   < > 0 75   ANION GAP mmol/L 8   < > 8   CALCIUM mg/dL 7 9*   < > 8 6   ALBUMIN g/dL  --   --  4 4   TOTAL BILIRUBIN mg/dL  --   --  1 09   ALK PHOS U/L  --   --  92   ALT U/L  --   --  16   AST U/L  --   --  20   GLUCOSE RANDOM mg/dL 118   < >  --     < > = values in this interval not displayed  Results from last 7 days   Lab Units 03/05/22  0854   INR  1 02         Results from last 7 days   Lab Units 03/05/22  0854   HEMOGLOBIN A1C % 4 7               * I Have Reviewed All Lab Data Listed Above  * Additional Pertinent Lab Tests Reviewed: [address] Admission Reviewed    Imaging:    No results found        Recent Cultures (last 7 days):           Last 24 Hours Medication List: Current Facility-Administered Medications   Medication Dose Route Frequency Provider Last Rate    acetaminophen  975 mg Oral [place], [place]      ascorbic acid  500 mg Oral Daily [place], [place]      aspirin  81 mg Oral Daily [place], [place]      atorvastatin  40 mg Oral Daily With Carena [place], [place]      busPIRone  20 mg Oral TID Francis Rowland PA-C      calcium carbonate  1,000 mg Oral Daily PRN Francis Rowland PA-C      enoxaparin  40 mg Subcutaneous Daily [place]      folic acid  1 mg Oral Daily [place], [place]      HYDROmorphone  0 5 mg Intravenous Q4H PRN Shalini Singh PA-C      hydrOXYzine HCL  50 mg Oral TID PRN Francis Rowland PA-C      lactated ringers  100 mL/hr Intravenous Continuous Shalini Singh PA-C 100 mL/hr (03/10/22 0208)    lithium carbonate  300 mg Oral HS Francis Rowland PA-C      mirtazapine  7 5 mg Oral HS PRN Francis Rowland PA-C      oxyCODONE  10 mg Oral Q4H PRN Shalini Robles PA-C      oxyCODONE  5 mg Oral Q4H PRN [facility] Robles PA-C      pantoprazole  20 mg Oral Early Morning [facility] Mcnulty [place], [place]      PARoxetine  60 mg Oral Daily [facility] [place], [place]      polyethylene glycol  17 g Oral Daily Javi Martínez MD      potassium chloride  20 mEq Oral Daily Shalini Robles PA-C      prazosin  2 mg Oral HS Shalini Robles PA-C      pregabalin  150 mg Oral TID Francis Rowland PA-C      promethazine  25 mg Intramuscular Q6H PRN Shalini Singh PA-C      propranolol  20 mg Oral BID before breakfast/lunch Francis Rowland PA-C      QUEtiapine  50 mg Oral HS Francis Rowland PA-C      senna  2 tablet Oral HS Javi Martínez MD      Valbenazine Tosylate  80 mg Oral Daily Francis Rowland [place]          Today, Patient Was Seen By: Javi Martínez MD    ** Please Note: Dictation voice to text software may have been used in the creation of this document   **

## 2022-03-11 NOTE — NURSING NOTE
This RN called pt's daughter  Pt's daughter will be bringing in home medications later this evening  Will report off to nightshift RN to reach out to [facility] about when to administer medication and if daughter does not bring medication to let [facility] know Per Dr Elis Leavitt

## 2022-03-11 NOTE — PLAN OF CARE
Problem: PHYSICAL THERAPY ADULT  Goal: Performs mobility at highest level of function for planned discharge setting  See evaluation for individualized goals  Description: Treatment/Interventions: Functional transfer training,LE strengthening/ROM,Elevations,Therapeutic exercise,Cognitive reorientation,Patient/family training,Equipment eval/education,Bed mobility,Gait training,Compensatory technique education,Continued evaluation,Spoke to nursing          See flowsheet documentation for full assessment, interventions and recommendations  3/11/2022 1521 by Kera Chowdhury PTA  Outcome: Progressing  Note: Prognosis: Fair  Problem List: Decreased strength,Decreased range of motion,Decreased endurance,Impaired balance,Decreased mobility,Decreased coordination,Decreased cognition,Impaired judgement,Decreased safety awareness,Pain,Orthopedic restrictions  Assessment: Pt  needed max A x 2 for all mobility except for LE ROM in supine  pt  needed max cues for techniques however unable to follow it well  Noted ataxic movements of extremities and poor UE WBing through RW during ambulation  pt  able to take only very few good steps with Max  A x 2 and max cues  R knee buckling and b/l LE instability and weakness noted  pt  needed assist in holding things in her hands  Needed max cueing for hand placement occ physical assist in placing the UEs given  Poor motor planning, coordination, cognition, safety, inability to follow cues, safety awareness and LE insatbility puts patient at a high risk of fall  pt  requires extensive assist of 2 for all WBing mobility  Will continue to recommend rehab at [place] to address the mobility, strength deficits  Barriers to Discharge: Decreased caregiver support,Inaccessible home environment  Barriers to Discharge Comments: zoe? level of supervision at home? PT Discharge Recommendation: Post acute rehabilitation services          See flowsheet documentation for full assessment       3/11/2022 1039 by Bettina Oates PTA  Outcome: Progressing  Note: Prognosis: Fair  Problem List: Decreased strength,Decreased range of motion,Impaired balance,Decreased mobility,Impaired judgement,Decreased cognition,Decreased coordination,Decreased safety awareness,Pain,Orthopedic restrictions,Obesity  Assessment: Pt  needed decreased assist for for supine to sit transfer this session  Pt  needed extended education in the importance of mobility and for technqiues for transfer and ambulation  Pt  noted with 90deg R knee flexion in sitting  Pt  reported pain in the LLE as well with mobility  B/L knee buckling noted with excess forward trunk flexion during ambulation  max A on the R and Min A on the L provided for ambulation for safety  Noted impaired speech and poor carry over of cues noted  pt  cannot follow directions consistently well  However occ  steadier steps noted during ambulation with max cues for focusing on tasks and technqiues  Pt  needed cues not to take hands off the RW during ambulation and also needed redirection as patient was distracted during ambulation  Pt  positioned seated on bedside chair post session with alarm engaged and all needs within reach  Pt  asked for assist calling her daughter however unable to reach any number daughter with any phone number provided by the patient/ Pt  reported the contact number we have in epic is an old number  Pt  requries extensive assist for all levels of mobility  Chair follow was given for ambulation due to increased risk of follow along with physical A x 2 for ambulation  Pt  continues to be a high risk of fall and needs assist for all mobility  Noted ataxic movements/tremors of LEs and UEs  Pt  unable to hold things in her hand firmly  Pt  dropped her phone and reported she drops food a lot while eating due to same   Pt  is recommended to be DC to rehab prior to returning home due to increased risk of fall, limited mobility, requires increased assist, and impaired cognition  Barriers to Discharge: Decreased caregiver support,Inaccessible home environment  Barriers to Discharge Comments: zoe? level of supervision at home? PT Discharge Recommendation: Post acute rehabilitation services          See flowsheet documentation for full assessment

## 2022-03-11 NOTE — PLAN OF CARE
Problem: PHYSICAL THERAPY ADULT  Goal: Performs mobility at highest level of function for planned discharge setting  See evaluation for individualized goals  Description: Treatment/Interventions: Functional transfer training,LE strengthening/ROM,Elevations,Therapeutic exercise,Cognitive reorientation,Patient/family training,Equipment eval/education,Bed mobility,Gait training,Compensatory technique education,Continued evaluation,Spoke to nursing          See flowsheet documentation for full assessment, interventions and recommendations  Outcome: Progressing  Note: Prognosis: Fair  Problem List: Decreased strength,Decreased range of motion,Impaired balance,Decreased mobility,Impaired judgement,Decreased cognition,Decreased coordination,Decreased safety awareness,Pain,Orthopedic restrictions,Obesity  Assessment: Pt  needed decreased assist for for supine to sit transfer this session  Pt  needed extended education in the importance of mobility and for technqiues for transfer and ambulation  Pt  noted with 90deg R knee flexion in sitting  Pt  reported pain in the LLE as well with mobility  B/L knee buckling noted with excess forward trunk flexion during ambulation  max A on the R and Min A on the L provided for ambulation for safety  Noted impaired speech and poor carry over of cues noted  pt  cannot follow directions consistently well  However occ  steadier steps noted during ambulation with max cues for focusing on tasks and technqiues  Pt  needed cues not to take hands off the RW during ambulation and also needed redirection as patient was distracted during ambulation  Pt  positioned seated on bedside chair post session with alarm engaged and all needs within reach  Pt  asked for assist calling her daughter however unable to reach any number daughter with any phone number provided by the patient/ Pt  reported the contact number we have in epic is an old number   Pt  requries extensive assist for all levels of mobility  Chair follow was given for ambulation due to increased risk of follow along with physical A x 2 for ambulation  Pt  continues to be a high risk of fall and needs assist for all mobility  Noted ataxic movements/tremors of LEs and UEs  Pt  unable to hold things in her hand firmly  Pt  dropped her phone and reported she drops food a lot while eating due to same  Pt  is recommended to be DC to rehab prior to returning home due to increased risk of fall, limited mobility, requires increased assist, and impaired cognition  Barriers to Discharge: Decreased caregiver support,Inaccessible home environment  Barriers to Discharge Comments: zoe? level of supervision at home? PT Discharge Recommendation: Post acute rehabilitation services          See flowsheet documentation for full assessment

## 2022-03-12 LAB
ANION GAP SERPL CALCULATED.3IONS-SCNC: 7 MMOL/L (ref 4–13)
BASOPHILS # BLD AUTO: 0.02 THOUSANDS/ΜL (ref 0–0.1)
BASOPHILS NFR BLD AUTO: 0 % (ref 0–1)
BUN SERPL-MCNC: 13 MG/DL (ref 5–25)
CALCIUM SERPL-MCNC: 8 MG/DL (ref 8.3–10.1)
CHLORIDE SERPL-SCNC: 105 MMOL/L (ref 100–108)
CO2 SERPL-SCNC: 25 MMOL/L (ref 21–32)
CREAT SERPL-MCNC: 0.89 MG/DL (ref 0.6–1.3)
EOSINOPHIL # BLD AUTO: 0.35 THOUSAND/ΜL (ref 0–0.61)
EOSINOPHIL NFR BLD AUTO: 5 % (ref 0–6)
ERYTHROCYTE [DISTWIDTH] IN BLOOD BY AUTOMATED COUNT: 14.5 % (ref 11.6–15.1)
GFR SERPL CREATININE-BSD FRML MDRD: 71 ML/MIN/1.73SQ M
GLUCOSE SERPL-MCNC: 102 MG/DL (ref 65–140)
HCT VFR BLD AUTO: 30.2 % (ref 34.8–46.1)
HGB BLD-MCNC: 9.9 G/DL (ref 11.5–15.4)
IMM GRANULOCYTES # BLD AUTO: 0.03 THOUSAND/UL (ref 0–0.2)
IMM GRANULOCYTES NFR BLD AUTO: 0 % (ref 0–2)
LYMPHOCYTES # BLD AUTO: 1.69 THOUSANDS/ΜL (ref 0.6–4.47)
LYMPHOCYTES NFR BLD AUTO: 22 % (ref 14–44)
MCH RBC QN AUTO: 28.9 PG (ref 26.8–34.3)
MCHC RBC AUTO-ENTMCNC: 32.8 G/DL (ref 31.4–37.4)
MCV RBC AUTO: 88 FL (ref 82–98)
MONOCYTES # BLD AUTO: 0.9 THOUSAND/ΜL (ref 0.17–1.22)
MONOCYTES NFR BLD AUTO: 12 % (ref 4–12)
NEUTROPHILS # BLD AUTO: 4.64 THOUSANDS/ΜL (ref 1.85–7.62)
NEUTS SEG NFR BLD AUTO: 61 % (ref 43–75)
NRBC BLD AUTO-RTO: 0 /100 WBCS
PLATELET # BLD AUTO: 269 THOUSANDS/UL (ref 149–390)
PMV BLD AUTO: 9.6 FL (ref 8.9–12.7)
POTASSIUM SERPL-SCNC: 4 MMOL/L (ref 3.5–5.3)
RBC # BLD AUTO: 3.43 MILLION/UL (ref 3.81–5.12)
SODIUM SERPL-SCNC: 137 MMOL/L (ref 136–145)
WBC # BLD AUTO: 7.63 THOUSAND/UL (ref 4.31–10.16)

## 2022-03-12 PROCEDURE — 99232 SBSQ HOSP IP/OBS MODERATE 35: CPT | Performed by: STUDENT IN AN ORGANIZED HEALTH CARE EDUCATION/TRAINING PROGRAM

## 2022-03-12 PROCEDURE — 97116 GAIT TRAINING THERAPY: CPT

## 2022-03-12 PROCEDURE — 99024 POSTOP FOLLOW-UP VISIT: CPT | Performed by: PHYSICIAN ASSISTANT

## 2022-03-12 PROCEDURE — 97110 THERAPEUTIC EXERCISES: CPT

## 2022-03-12 PROCEDURE — 85025 COMPLETE CBC W/AUTO DIFF WBC: CPT | Performed by: STUDENT IN AN ORGANIZED HEALTH CARE EDUCATION/TRAINING PROGRAM

## 2022-03-12 PROCEDURE — 80048 BASIC METABOLIC PNL TOTAL CA: CPT | Performed by: PHYSICIAN ASSISTANT

## 2022-03-12 PROCEDURE — 97530 THERAPEUTIC ACTIVITIES: CPT

## 2022-03-12 RX ORDER — CLONAZEPAM 0.5 MG/1
0.5 TABLET ORAL 2 TIMES DAILY
Status: COMPLETED | OUTPATIENT
Start: 2022-03-12 | End: 2022-03-13

## 2022-03-12 RX ADMIN — HYDROMORPHONE HYDROCHLORIDE 0.5 MG: 1 INJECTION, SOLUTION INTRAMUSCULAR; INTRAVENOUS; SUBCUTANEOUS at 05:23

## 2022-03-12 RX ADMIN — PREGABALIN 150 MG: 75 CAPSULE ORAL at 16:57

## 2022-03-12 RX ADMIN — ACETAMINOPHEN 325MG 975 MG: 325 TABLET ORAL at 14:09

## 2022-03-12 RX ADMIN — BUSPIRONE HYDROCHLORIDE 20 MG: 10 TABLET ORAL at 21:16

## 2022-03-12 RX ADMIN — FOLIC ACID 1 MG: 1 TABLET ORAL at 08:34

## 2022-03-12 RX ADMIN — ACETAMINOPHEN 325MG 975 MG: 325 TABLET ORAL at 21:16

## 2022-03-12 RX ADMIN — PRAZOSIN HYDROCHLORIDE 2 MG: 2 CAPSULE ORAL at 21:16

## 2022-03-12 RX ADMIN — OXYCODONE HYDROCHLORIDE 10 MG: 10 TABLET ORAL at 08:38

## 2022-03-12 RX ADMIN — LITHIUM CARBONATE 300 MG: 300 TABLET, FILM COATED, EXTENDED RELEASE ORAL at 21:16

## 2022-03-12 RX ADMIN — ENOXAPARIN SODIUM 40 MG: 40 INJECTION SUBCUTANEOUS at 01:41

## 2022-03-12 RX ADMIN — HYDROMORPHONE HYDROCHLORIDE 0.5 MG: 1 INJECTION, SOLUTION INTRAMUSCULAR; INTRAVENOUS; SUBCUTANEOUS at 17:06

## 2022-03-12 RX ADMIN — POLYETHYLENE GLYCOL 3350 17 G: 17 POWDER, FOR SOLUTION ORAL at 08:34

## 2022-03-12 RX ADMIN — BUSPIRONE HYDROCHLORIDE 20 MG: 10 TABLET ORAL at 08:34

## 2022-03-12 RX ADMIN — PAROXETINE 60 MG: 20 TABLET, FILM COATED ORAL at 08:34

## 2022-03-12 RX ADMIN — POTASSIUM CHLORIDE 20 MEQ: 1500 TABLET, EXTENDED RELEASE ORAL at 08:34

## 2022-03-12 RX ADMIN — SENNOSIDES 17.2 MG: 8.6 TABLET ORAL at 21:16

## 2022-03-12 RX ADMIN — PREGABALIN 150 MG: 75 CAPSULE ORAL at 08:33

## 2022-03-12 RX ADMIN — PANTOPRAZOLE SODIUM 20 MG: 20 TABLET, DELAYED RELEASE ORAL at 05:16

## 2022-03-12 RX ADMIN — BUSPIRONE HYDROCHLORIDE 20 MG: 10 TABLET ORAL at 16:57

## 2022-03-12 RX ADMIN — OXYCODONE HYDROCHLORIDE 10 MG: 10 TABLET ORAL at 19:36

## 2022-03-12 RX ADMIN — OXYCODONE HYDROCHLORIDE AND ACETAMINOPHEN 500 MG: 500 TABLET ORAL at 08:34

## 2022-03-12 RX ADMIN — OXYCODONE HYDROCHLORIDE 10 MG: 10 TABLET ORAL at 14:08

## 2022-03-12 RX ADMIN — ATORVASTATIN CALCIUM 40 MG: 40 TABLET, FILM COATED ORAL at 16:57

## 2022-03-12 RX ADMIN — QUETIAPINE FUMARATE 50 MG: 25 TABLET ORAL at 21:16

## 2022-03-12 RX ADMIN — ACETAMINOPHEN 325MG 975 MG: 325 TABLET ORAL at 05:16

## 2022-03-12 RX ADMIN — PREGABALIN 150 MG: 75 CAPSULE ORAL at 21:15

## 2022-03-12 RX ADMIN — ASPIRIN 81 MG CHEWABLE TABLET 81 MG: 81 TABLET CHEWABLE at 08:34

## 2022-03-12 RX ADMIN — CLONAZEPAM 0.5 MG: 0.5 TABLET ORAL at 19:36

## 2022-03-12 NOTE — PHYSICAL THERAPY NOTE
PHYSICAL THERAPY NOTE          Patient Name: Kalli Ronquillo  KAEDQ'D Date: 3/12/2022     03/12/22 2416   Note Type   Note Type Treatment   Pain Assessment   Pain Assessment Tool 0-10   Pain Score 9   Pain Location/Orientation Orientation: Left;Orientation: Right;Location: Leg  (L LATERAL THIGH AND R KNEE )   Hospital Pain Intervention(s) Repositioned; Ambulation/increased activity; Emotional support;Cold applied   Restrictions/Precautions   RLE Weight Bearing Per Order WBAT   Other Precautions Cognitive; Chair Alarm; Bed Alarm; Fall Risk;Pain   General   Chart Reviewed Yes   Cognition   Overall Cognitive Status Impaired   Arousal/Participation Alert; Responsive; Cooperative   Attention Attends with cues to redirect   Orientation Level Oriented to person;Oriented to place   Memory Decreased recall of precautions;Decreased recall of recent events;Decreased short term memory   Following Commands Follows one step commands with increased time or repetition   Subjective   Subjective I'M HAVING PAIN IN MY LE LEG  I HAVE PAIN IN BOTH LEGS  Bed Mobility   Supine to Sit 4  Minimal assistance   Additional items Assist x 1;Bedrails; Increased time required;Verbal cues;LE management;HOB elevated   Transfers   Sit to Stand 3  Moderate assistance   Additional items Assist x 2;Bedrails; Increased time required;Verbal cues   Stand to Sit 3  Moderate assistance   Additional items Assist x 2;Armrests; Increased time required;Verbal cues   Stand pivot 3  Moderate assistance   Additional items Assist x 2; Increased time required;Verbal cues   Ambulation/Elevation   Gait pattern Antalgic;Decreased R stance; Excessively slow; Short stride; Ataxia; Decreased foot clearance   Gait Assistance 3  Moderate assist   Additional items Assist x 2;Verbal cues   Assistive Device Rolling walker   Distance 3' X1   Balance   Static Sitting Fair   Dynamic Sitting Fair -   Static Standing Poor   Dynamic Standing Poor -   Ambulatory Poor -   Activity Tolerance   Activity Tolerance Patient limited by pain; Patient limited by fatigue   Nurse Made Aware YES   Medical Staff Made Aware RN, [facility]   Exercises   Heelslides Sitting;10 reps;AAROM; Right   Hip Flexion Sitting;10 reps;AAROM; Right   Hip Adduction Sitting;10 reps;Bilateral  [facility] HIP ADD)   Knee AROM Long Arc Quad Sitting;10 reps;AAROM; Right   Ankle Pumps Sitting;10 reps;AAROM;AROM; Bilateral   Assessment   Prognosis Fair   Problem List Decreased strength;Decreased range of motion;Decreased endurance; Impaired balance;Decreased mobility; Decreased cognition;Decreased safety awareness; Impaired judgement;Obesity; Decreased skin integrity;Orthopedic restrictions;Pain   Assessment PT IS REQUIRING LESS ASSISTANCE THIS PM FOR BED MOBILITY, TRANSFERS AND AMBULATION  PT  PERFORMS SUPINE TO SIT WITH MIN ASSIST X1 WITH MAX CUES AND INCREASED TIME TO COMPLETE,  TRANSFERS WITH MOD ASSIST X2, AND AMBULATION WITH MOD ASSIST X2 3' X1    PT REQUIRES CUES FOR LE SEQUENCING, PROPER USE OF UE'S FOR WEIGHTBEARING, UPRIGHT POSTURE, AND SAFETY  NO BUCKLING OF LE'S NOTED THIS SESSION  PT LIMITED BY FATIGUE, PAIN,DECREASED activity tolerance, poor safety, decreased balance, gait deviations and increased risk for falls  PT  PERFORMS R LE SEATED A-AAROM EXERCISES FOR TKR  The patient's AM-PAC Basic Mobility Inpatient Short Form Raw Score is 11  A Raw score of less than or equal to 16 suggests the patient may benefit from discharge to post-acute rehabilitation services  Please also refer to the recommendation of the Physical Therapist for safe discharge planning   PT  WILL BENEFIT FROM CONTINUED INPT PT AND STR AT D/C IN ORDER TO MAXIMIZE FUNCTIONAL OUTCOMES, MOBILITY AND INDEPENDENCE FOR IMPROVED QUALITY OF LIFE     Goals   Patient Goals TO GET BETTER AND GO HOME     STG Expiration Date 03/19/22   PT Treatment Day 7   Plan   Treatment/Interventions LE strengthening/ROM; Functional transfer training; Therapeutic exercise; Endurance training;Patient/family training;Equipment eval/education; Bed mobility;Gait training;Spoke to nursing   Progress Slow progress, decreased activity tolerance  (PAIN)   PT Frequency Twice a day   Recommendation   PT Discharge Recommendation Post acute rehabilitation services   AM-PAC Basic Mobility Inpatient   Turning in Bed Without Bedrails 2   Lying on Back to Sitting on Edge of Flat Bed 2   Moving Bed to Chair 2   Standing Up From Chair 2   Walk in Room 2   Climb 3-5 Stairs 1   Basic Mobility Inpatient Raw Score 11   Basic Mobility Standardized Score 30 25   Highest Level Of Mobility   -[facility] Goal 4: Move to chair/commode   -[facility] Highest Level of Mobility 5: Stand (1 or more minutes)   -[facility] Goal Achieved Yes   End of Consult   Patient Position at End of Consult Bedside chair; All needs within reach   [place], [place]

## 2022-03-12 NOTE — PHYSICAL THERAPY NOTE
PHYSICAL THERAPY NOTE          Patient Name: Cherylin Denver  [id number] Date: 3/12/2022     03/12/22 8102   Note Type   Note Type Treatment   Pain Assessment   Pain Assessment Tool 0-10   Pain Score 8   Pain Location/Orientation Orientation: Right;Location: Knee   Hospital Pain Intervention(s) Cold applied;Repositioned; Ambulation/increased activity; Emotional support; Rest   Restrictions/Precautions   RLE Weight Bearing Per Order WBAT   Other Precautions Cognitive; Chair Alarm; Bed Alarm; Fall Risk;Pain   General   Chart Reviewed Yes   Cognition   Overall Cognitive Status Impaired   Arousal/Participation Alert; Responsive   Attention Attends with cues to redirect   Orientation Level Oriented to person;Disoriented to time;Oriented to place  (oriented to month not year, day or date  )   Memory Decreased short term memory;Decreased recall of precautions;Decreased recall of recent events   Following Commands Follows one step commands inconsistently   Subjective   Subjective It's painful and swollen around my knee  Bed Mobility   Supine to Sit 3  Moderate assistance   Additional items Assist x 1;HOB elevated; Increased time required; Bedrails;Verbal cues;LE management   Sit to Supine 2  Maximal assistance   Additional items Assist x 2; Increased time required;LE management   Transfers   Sit to Stand 3  Moderate assistance   Additional items Assist x 2;Armrests; Increased time required;Verbal cues   Stand to Sit 3  Moderate assistance   Additional items Assist x 2; Increased time required;Verbal cues;Armrests   Stand pivot 2  Maximal assistance   Additional items Assist x 2; Increased time required;Verbal cues   Toilet transfer 3  Moderate assistance   Additional items Assist x 2;Armrests; Increased time required;Verbal cues; Commode   Ambulation/Elevation   Gait pattern Poor UE support; Improper Weight shift;Decreased foot clearance; Antalgic;Decreased R stance; Foward flexed;R Knee Analilia; Short stride; Step to;Excessively slow   Gait Assistance 2  Maximal assist   Additional items Assist x 2;Verbal cues   Assistive Device Rolling walker   Distance 2' x2   Balance   Static Sitting Fair   Dynamic Sitting Fair -   Static Standing Poor   Dynamic Standing Poor -   Ambulatory Poor -   Activity Tolerance   Activity Tolerance Patient limited by pain; Patient limited by fatigue   Medical Staff Made Aware Mil[place] Lon   Exercises   TKR Supine;AAROM;AROM; Right  (x 12 reps  ap, qs, a/a-a hs, a/a hip abd /add , a/a slr)   Equipment Use   Comments total assist for sona care  Assessment   Prognosis Fair   Problem List Decreased strength;Decreased range of motion;Decreased endurance; Impaired balance;Decreased mobility; Decreased cognition; Impaired judgement;Decreased safety awareness;Decreased skin integrity;Orthopedic restrictions;Pain   Assessment Pt  Is requiring increased assistance for all aspects of mobility, mod assist x1 for supine to sit, max assist x2 for sit to supine, mod assist x2 for sit<--> stand transfers and max assist x2 for stand pivot and ambulation due to increased R knee pain, Fatigue, poor activity tolerance, poor safety, decreased balance, gait deviations and increased risk for falls  Noted (+) buckling of R le with transfers and ambulation  Pt  Demonstrates difficulty in processing information and motor planning requires max cues( tactile and verbal for proper exercise performance and techniques and at times requires assistance for initiation  Poor carry over with learned techniques noted  Pt's R le repositioned several times for proper positioning to promote knee extension and healing  The patient's AM-PAC Basic Mobility Inpatient Short Form Raw Score is 11  A Raw score of less than or equal to 16 suggests the patient may benefit from discharge to post-acute rehabilitation services   Please also refer to the recommendation of the Physical Therapist for safe discharge planning    PT  WILL BENEFIT FROM CONTINUED INPT PT AND STR AT D/C IN ORDER TO MAXIMIZE FUNCTIONAL OUTCOMES, MOBILITY AND INDEPENDENCE FOR IMPROVED QUALITY OF LIFE  Goals   Patient Goals to get better and get back home   STG Expiration Date 03/19/22   PT Treatment Day 6   Plan   PT Frequency Twice a day   Recommendation   PT Discharge Recommendation Post acute rehabilitation services   AM-PAC Basic Mobility Inpatient   Turning in Bed Without Bedrails 2   Lying on Back to Sitting on Edge of Flat Bed 2   Moving Bed to Chair 2   Standing Up From Chair 2   Walk in Room 2   Climb 3-5 Stairs 1   Basic Mobility Inpatient Raw Score 11   Basic Mobility Standardized Score 30 25   Highest Level Of Mobility   -HL Goal 4: Move to chair/commode   JH-HLM Highest Level of Mobility 5: Stand (1 or more minutes)   -HLM Goal Achieved Yes   Education   Education Provided Mobility training  (pt ed on positioning of R le when at rest in bed)   End of Consult   Patient Position at End of Consult Supine;Bed/Chair alarm activated; All needs within reach   End of Consult Comments ice to R knee scd's to b/lle's       Padmaja Adamson, SHERLY

## 2022-03-12 NOTE — PROGRESS NOTES
[address] [place] [address]  Progress Note - Samantha Reyesgo 1965, 60 y o  female MRN: [id number]  Unit/Bed#: E2 -02 Encounter: [id number]  Primary Care Provider: Roxanne Chapin DO   Date and time admitted to hospital: 3/9/2022  9:14 AM    * S/P total knee arthroplasty, right  Assessment & Plan  Status post 3/9 right total knee arthroplasty  PT and OT currently recommend rehab  H/H stable  DVTppx with lovenox, will need 30 days  Follow up with ortho in 2 weeks  Psych consulted for evaluation, will need option for placement    History of CVA (cerebrovascular accident)  Assessment & Plan  Continue aspirin statin    Tardive dyskinesia  Assessment & Plan  Continue Lyrica  Valbenazine not on formulary  Family notified to bring medication in    Essential hypertension  Assessment & Plan  Continue inderal and prazosin  BP currently controlled    Generalized anxiety disorder  Assessment & Plan  Continue Paxil and BuSpar    Esophageal reflux  Assessment & Plan  Continue PPI    Bipolar II disorder ([facility] [address] )  Assessment & Plan  Follows with Psychiatry outpatient  Continue lithium, and Seroquel bedtime    Lumbar radiculopathy  Assessment & Plan  · s/p thoracolumbar decompression and fusion in 2018 in [place] with subsequent laminectomy syndrome   · S/p thoracic spinal cord stimulator placement in 7/28/20  · Recommend continue follow-up with Neurosurgery outpatient for further evaluation as patient does not use spinal stimulator at this time      Chronic pain disorder  Assessment & Plan  History of opioid dependence, reporting bilateral knee pain  Caution regarding significant opioids for pain control  Recommend topical agents, Voltaren, diclofenac, Lidoderm patches  Minimize oxycodone as needed to work with physical therapy        VTE Pharmacologic Prophylaxis:   Pharmacologic: Enoxaparin (Lovenox)  Mechanical VTE Prophylaxis in Place: Yes    Patient Centered Rounds: I have performed bedside rounds with nursing staff today  Discussions with Specialists or Other Care Team Provider: Ortho    Education and Discussions with Family / Patient: patient, daughter on phone    Time Spent for Care: 30 minutes  More than 50% of total time spent on counseling and coordination of care as described above  Current Length of Stay: 2 day(s)    Current Patient Status: Inpatient   Certification Statement: The patient will continue to require additional inpatient hospital stay due to pending placement    Discharge Plan: Will need short-term rehab, pending psych evaluation for optioning    Code Status: Level 1 - Full Code       Subjective:   No events overnight  Reports continued pain throughout, abdominal, back and right leg with mostly chronic complaints  Call and updated daughter, not able to find medications for her tardive dyskinesia  Objective:     Vitals:   Temp (24hrs), Av 5 °F (36 4 °C), Min:95 9 °F (35 5 °C), Max:98 8 °F (37 1 °C)    Temp:  [95 9 °F (35 5 °C)-98 8 °F (37 1 °C)] 97 8 °F (36 6 °C)  HR:  [86-97] 97  Resp:  [18-19] 18  BP: (114-125)/(72-93) 114/80  SpO2:  [96 %-98 %] 98 %  Body mass index is 34 96 kg/m²  Input and Output Summary (last 24 hours): Intake/Output Summary (Last 24 hours) at 3/12/2022 1557  Last data filed at 3/11/2022 2026  Gross per 24 hour   Intake 240 ml   Output 200 ml   Net 40 ml       Physical Exam:     Physical Exam  Vitals and nursing note reviewed  Constitutional:       Appearance: She is obese  HENT:      Head: Normocephalic and atraumatic  Mouth/Throat:      Comments: Tongue smacking  Eyes:      General: No scleral icterus  Conjunctiva/sclera: Conjunctivae normal    Cardiovascular:      Rate and Rhythm: Normal rate and regular rhythm  Heart sounds: Normal heart sounds  Pulmonary:      Effort: Pulmonary effort is normal  No respiratory distress  Breath sounds: Normal breath sounds  No wheezing     Abdominal:      General: Bowel sounds are normal  There is no distension  Palpations: Abdomen is soft  Tenderness: There is abdominal tenderness  Musculoskeletal:         General: Swelling present  Right lower leg: No edema  Left lower leg: No edema  Skin:     General: Skin is warm and dry  Neurological:      Mental Status: She is alert  Mental status is at baseline  Comments: Dysarthric       Additional Data:     Labs:    Results from last 7 days   Lab Units 03/12/22  0456   WBC Thousand/uL 7 63   HEMOGLOBIN g/dL 9 9*   HEMATOCRIT % 30 2*   PLATELETS Thousands/uL 269   NEUTROS PCT % 61   LYMPHS PCT % 22   MONOS PCT % 12   EOS PCT % 5     Results from last 7 days   Lab Units 03/12/22  0456   SODIUM mmol/L 137   POTASSIUM mmol/L 4 0   CHLORIDE mmol/L 105   CO2 mmol/L 25   BUN mg/dL 13   CREATININE mg/dL 0 89   ANION GAP mmol/L 7   CALCIUM mg/dL 8 0*   GLUCOSE RANDOM mg/dL 102                           * I Have Reviewed All Lab Data Listed Above  * Additional Pertinent Lab Tests Reviewed: [address] Admission Reviewed    Imaging:    No results found        Recent Cultures (last 7 days):           Last 24 Hours Medication List:   Current Facility-Administered Medications   Medication Dose Route Frequency Provider Last Rate    acetaminophen  975 mg Oral [facility] Mal[facility] [place], [place]      ascorbic acid  500 mg Oral Daily Rayna Phan[place], [place]      aspirin  81 mg Oral Daily Rayna Phan[place]asso, [place]      atorvastatin  40 mg Oral Daily [facility]      busPIRone  20 mg Oral TID Taz Johnson PA-C      calcium carbonate  1,000 mg Oral Daily PRN Taz Johnson PA-C      enoxaparin  40 mg Subcutaneous Daily Rayna Phan[place], [place]      folic acid  1 mg Oral Daily Rayna Phan[place], [place]      HYDROmorphone  0 5 mg Intravenous Q4H PRN Rayna Singh PA-C      hydrOXYzine HCL  50 mg Oral TID PRN Taz Johnson PA-C      lithium carbonate  300 mg Oral HS Taz Johnson PA-C      mirtazapine  7 5 mg Oral HS PRN [facility], [facility]      oxyCODONE  10 mg Oral Q4H PRN [facility], PA-DAMIAN      oxyCODONE  5 mg Oral Q4H PRN [facility], PA-DAMIAN      pantoprazole  20 mg Oral Early Morning [place], [place]      PARoxetine  60 mg Oral Daily [place], [place]      polyethylene glycol  17 g Oral Daily Yesenia Mcdaniels MD      potassium chloride  20 mEq Oral Daily [place], [place]      prazosin  2 mg Oral HS [place], [place]      pregabalin  150 mg Oral TID Caesar Sep, PA-C      promethazine  25 mg Intramuscular Q6H PRN [facility], JACQUI      propranolol  20 mg Oral BID before breakfast/lunch Caesar Sep, PA-C      QUEtiapine  50 mg Oral HS Caesar Sep, PA-C      senna  2 tablet Oral HS Yesenia Mcdaniels MD      Valbenazine Tosylate  80 mg Oral Daily [place], [place]          Today, Patient Was Seen By: Yesenia Mcdaniels MD    ** Please Note: Dictation voice to text software may have been used in the creation of this document   **

## 2022-03-12 NOTE — PROGRESS NOTES
Orthopedics     Samantha Bennett Alpha 60 y o  female MRN: [id number]  Unit/Bed#: E2 -02 Encounter: [id number]      Subjective:  Pt seen this am  She is still waiting to get her Valbenazine tosylate from her daughter, so history is limited  She does note persistent right knee pain  No fevers or chills      Labs:  0   Lab Value Date/Time    HCT 30 2 (L) 03/12/2022 0456    HCT 29 4 (L) 03/11/2022 0537    HCT 34 4 (L) 03/10/2022 0515    HCT 41 9 10/26/2015 1123    HCT 39 9 02/22/2014 0615    HCT 38 2 02/21/2014 0533    HGB 9 9 (L) 03/12/2022 0456    HGB 10 1 (L) 03/11/2022 0537    HGB 11 6 03/10/2022 0515    HGB 14 9 10/26/2015 1123    HGB 13 7 02/22/2014 0615    HGB 13 0 02/21/2014 0533    INR 1 02 03/05/2022 0854    WBC 7 63 03/12/2022 0456    WBC 6 71 03/11/2022 0537    WBC 10 11 03/10/2022 0515    WBC 4 50 10/26/2015 1123    WBC 4 52 02/22/2014 0615    WBC 4 79 02/21/2014 0533    ESR 16 03/08/2020 2129    CRP 7 5 03/05/2022 0854       Meds:    Current Facility-Administered Medications:     acetaminophen (TYLENOL) tablet 975 mg, 975 mg, Oral, Q8H [address], Joel Singh PA-C, 975 mg at 03/12/22 0516    ascorbic acid (VITAMIN C) tablet 500 mg, 500 mg, Oral, Daily, Joel Singh PA-C, 500 mg at 03/12/22 1451    aspirin chewable tablet 81 mg, 81 mg, Oral, Daily, Joel Singh PA-C, 81 mg at 03/12/22 6333    atorvastatin (LIPITOR) tablet 40 mg, 40 mg, Oral, Daily With Dinner, Peggy Miller PA-C, 40 mg at 03/11/22 1526    busPIRone (BUSPAR) tablet 20 mg, 20 mg, Oral, TID, Joel Singh PA-C, 20 mg at 03/12/22 3025    calcium carbonate (TUMS) chewable tablet 1,000 mg, 1,000 mg, Oral, Daily PRN, Joel Singh PA-C    enoxaparin (LOVENOX) subcutaneous injection 40 mg, 40 mg, Subcutaneous, Daily, Joel Singh PA-C, 40 mg at 35/17/11 7470    folic acid (FOLVITE) tablet 1 mg, 1 mg, Oral, Daily, Joel Singh PA-C, 1 mg at 03/12/22 0834    HYDROmorphone (DILAUDID) injection 0 5 mg, 0 5 mg, Intravenous, Q4H PRN, Marc Singh PA-C, 0 5 mg at 03/12/22 6470    hydrOXYzine HCL (ATARAX) tablet 50 mg, 50 mg, Oral, TID PRN, Chela Askew PA-C    lithium carbonate (LITHOBID) CR tablet 300 mg, 300 mg, Oral, HS, Marc Singh PA-C, 300 mg at 03/11/22 2148    mirtazapine (REMERON) tablet 7 5 mg, 7 5 mg, Oral, HS PRN, Marc Signh PA-C, 7 5 mg at 03/09/22 2119    oxyCODONE (ROXICODONE) immediate release tablet 10 mg, 10 mg, Oral, Q4H PRN, Marc Singh PA-C, 10 mg at 03/12/22 6910    oxyCODONE (ROXICODONE) IR tablet 5 mg, 5 mg, Oral, Q4H PRN, Marc Singh PA-C, 5 mg at 03/11/22 1125    pantoprazole (PROTONIX) EC tablet 20 mg, 20 mg, Oral, Early Morning, Marc Singh PA-C, 20 mg at 03/12/22 0516    PARoxetine (PAXIL) tablet 60 mg, 60 mg, Oral, Daily, Marc Singh PA-C, 60 mg at 03/12/22 0834    polyethylene glycol (MIRALAX) packet 17 g, 17 g, Oral, Daily, Alicia Mendoza MD, 17 g at 03/12/22 0834    potassium chloride (K-DUR,KLOR-CON) CR tablet 20 mEq, 20 mEq, Oral, Daily, Marc Singh PA-C, 20 mEq at 03/12/22 0834    prazosin (MINIPRESS) capsule 2 mg, 2 mg, Oral, HS, Marc Singh PA-C, 2 mg at 03/11/22 2148    pregabalin (LYRICA) capsule 150 mg, 150 mg, Oral, TID, Marc Singh PA-C, 150 mg at 03/12/22 0493    promethazine (PHENERGAN) injection 25 mg, 25 mg, Intramuscular, Q6H PRN, Marc Singh PA-C    propranolol (INDERAL) tablet 20 mg, 20 mg, Oral, BID before breakfast/lunch, Marc Singh PA-C, 20 mg at 03/11/22 1126    QUEtiapine (SEROquel) tablet 50 mg, 50 mg, Oral, HS, Marc Singh PA-C, 50 mg at 03/11/22 2147    senna (SENOKOT) tablet 17 2 mg, 2 tablet, Oral, HS, Alicia Mendoza MD, 17 2 mg at 03/11/22 2147    Valbenazine Tosylate CAPS 80 mg, 80 mg, Oral, Daily, Chela Askew PA-C    Physical exam:  Vitals:    03/12/22 0730   BP: 123/72   Pulse: 87   Resp: 18   Temp: 98 8 °F (37 1 °C)   SpO2: 96%     Right Knee:  · Incision C/D/I  No erythema or drainage    · Dressing changed  · Motor/sensation intact RLE  · Palpable DP pulse    Assessment: 60 y  o female POD 3 s/p R TKA, Tardive Dyskinesia    Plan:  · Pain Control  · WBAT RLE  · PT/OT  · DVT prophylaxis, Lovenox  · ABLA  Hgb 9 9 this am  Continue to monitor clinically  · Tardive Dyskinesia - awaiting medication to be brought in by daughter from home  · D/c planning  Stable for d/c from orthopedic standpoint  Follow up with Dr Reuben Hernández as outpt      Katy Almeida PA-C

## 2022-03-12 NOTE — PLAN OF CARE
Problem: PHYSICAL THERAPY ADULT  Goal: Performs mobility at highest level of function for planned discharge setting  See evaluation for individualized goals  Description: Treatment/Interventions: Functional transfer training,LE strengthening/ROM,Elevations,Therapeutic exercise,Cognitive reorientation,Patient/family training,Equipment eval/education,Bed mobility,Gait training,Compensatory technique education,Continued evaluation,Spoke to nursing          See flowsheet documentation for full assessment, interventions and recommendations  Outcome: Progressing  Note: Prognosis: Fair  Problem List: Decreased strength,Decreased range of motion,Decreased endurance,Impaired balance,Decreased mobility,Decreased cognition,Impaired judgement,Decreased safety awareness,Decreased skin integrity,Orthopedic restrictions,Pain  Assessment: Pt  Is requiring increased assistance for all aspects of mobility, mod assist x1 for supine to sit, max assist x2 for sit to supine, mod assist x2 for sit<--> stand transfers and max assist x2 for stand pivot and ambulation due to increased R knee pain, Fatigue, poor activity tolerance, poor safety, decreased balance, gait deviations and increased risk for falls  Noted (+) buckling of R le with transfers and ambulation  Pt  Demonstrates difficulty in processing information and motor planning requires max cues( tactile and verbal for proper exercise performance and techniques and at times requires assistance for initiation  Poor carry over with learned techniques noted  Pt's R le repositioned several times for proper positioning to promote knee extension and healing  The patient's AM-PAC Basic Mobility Inpatient Short Form Raw Score is 11  A Raw score of less than or equal to 16 suggests the patient may benefit from discharge to post-acute rehabilitation services  Please also refer to the recommendation of the Physical Therapist for safe discharge planning     PT  WILL BENEFIT FROM CONTINUED INPT PT AND STR AT D/C IN ORDER TO MAXIMIZE FUNCTIONAL OUTCOMES, MOBILITY AND INDEPENDENCE FOR IMPROVED QUALITY OF LIFE  Barriers to Discharge: Decreased caregiver support,Inaccessible home environment  Barriers to Discharge Comments: zoe? level of supervision at home? PT Discharge Recommendation: Post acute rehabilitation services          See flowsheet documentation for full assessment

## 2022-03-12 NOTE — PLAN OF CARE
Problem: PHYSICAL THERAPY ADULT  Goal: Performs mobility at highest level of function for planned discharge setting  See evaluation for individualized goals  Description: Treatment/Interventions: Functional transfer training,LE strengthening/ROM,Elevations,Therapeutic exercise,Cognitive reorientation,Patient/family training,Equipment eval/education,Bed mobility,Gait training,Compensatory technique education,Continued evaluation,Spoke to nursing          See flowsheet documentation for full assessment, interventions and recommendations  3/12/2022 1610 by Danielle Bolivar PTA  Outcome: Progressing  Note: Prognosis: Fair  Problem List: Decreased strength,Decreased range of motion,Decreased endurance,Impaired balance,Decreased mobility,Decreased cognition,Decreased safety awareness,Impaired judgement,Obesity,Decreased skin integrity,Orthopedic restrictions,Pain  Assessment: PT IS REQUIRING LESS ASSISTANCE THIS PM FOR BED MOBILITY, TRANSFERS AND AMBULATION  PT  PERFORMS SUPINE TO SIT WITH MIN ASSIST X1 WITH MAX CUES AND INCREASED TIME TO COMPLETE,  TRANSFERS WITH MOD ASSIST X2, AND AMBULATION WITH MOD ASSIST X2 3' X1    PT REQUIRES CUES FOR LE SEQUENCING, PROPER USE OF UE'S FOR WEIGHTBEARING, UPRIGHT POSTURE, AND SAFETY  NO BUCKLING OF LE'S NOTED THIS SESSION  PT LIMITED BY FATIGUE, PAIN,DECREASED activity tolerance, poor safety, decreased balance, gait deviations and increased risk for falls  PT  PERFORMS R LE SEATED A-AAROM EXERCISES FOR TKR  The patient's AM-PAC Basic Mobility Inpatient Short Form Raw Score is 11  A Raw score of less than or equal to 16 suggests the patient may benefit from discharge to post-acute rehabilitation services  Please also refer to the recommendation of the Physical Therapist for safe discharge planning  PT  WILL BENEFIT FROM CONTINUED INPT PT AND STR AT D/C IN ORDER TO MAXIMIZE FUNCTIONAL OUTCOMES, MOBILITY AND INDEPENDENCE FOR IMPROVED QUALITY OF LIFE    Barriers to Discharge: Decreased caregiver support,Inaccessible home environment  Barriers to Discharge Comments: zoe? level of supervision at home? PT Discharge Recommendation: Post acute rehabilitation services          See flowsheet documentation for full assessment

## 2022-03-13 PROCEDURE — 97535 SELF CARE MNGMENT TRAINING: CPT

## 2022-03-13 PROCEDURE — 97110 THERAPEUTIC EXERCISES: CPT

## 2022-03-13 PROCEDURE — 97116 GAIT TRAINING THERAPY: CPT

## 2022-03-13 PROCEDURE — 99024 POSTOP FOLLOW-UP VISIT: CPT | Performed by: PHYSICIAN ASSISTANT

## 2022-03-13 PROCEDURE — 99232 SBSQ HOSP IP/OBS MODERATE 35: CPT | Performed by: STUDENT IN AN ORGANIZED HEALTH CARE EDUCATION/TRAINING PROGRAM

## 2022-03-13 PROCEDURE — 97530 THERAPEUTIC ACTIVITIES: CPT

## 2022-03-13 RX ORDER — CLONAZEPAM 0.5 MG/1
0.5 TABLET ORAL 2 TIMES DAILY PRN
Status: DISCONTINUED | OUTPATIENT
Start: 2022-03-14 | End: 2022-03-16

## 2022-03-13 RX ADMIN — BUSPIRONE HYDROCHLORIDE 20 MG: 10 TABLET ORAL at 15:01

## 2022-03-13 RX ADMIN — ACETAMINOPHEN 325MG 975 MG: 325 TABLET ORAL at 15:01

## 2022-03-13 RX ADMIN — OXYCODONE HYDROCHLORIDE AND ACETAMINOPHEN 500 MG: 500 TABLET ORAL at 08:19

## 2022-03-13 RX ADMIN — PREGABALIN 150 MG: 75 CAPSULE ORAL at 08:18

## 2022-03-13 RX ADMIN — PAROXETINE 60 MG: 20 TABLET, FILM COATED ORAL at 08:18

## 2022-03-13 RX ADMIN — ENOXAPARIN SODIUM 40 MG: 40 INJECTION SUBCUTANEOUS at 03:16

## 2022-03-13 RX ADMIN — FOLIC ACID 1 MG: 1 TABLET ORAL at 08:18

## 2022-03-13 RX ADMIN — PANTOPRAZOLE SODIUM 20 MG: 20 TABLET, DELAYED RELEASE ORAL at 05:05

## 2022-03-13 RX ADMIN — ATORVASTATIN CALCIUM 40 MG: 40 TABLET, FILM COATED ORAL at 17:41

## 2022-03-13 RX ADMIN — POLYETHYLENE GLYCOL 3350 17 G: 17 POWDER, FOR SOLUTION ORAL at 08:18

## 2022-03-13 RX ADMIN — CLONAZEPAM 0.5 MG: 0.5 TABLET ORAL at 08:18

## 2022-03-13 RX ADMIN — OXYCODONE HYDROCHLORIDE 10 MG: 10 TABLET ORAL at 09:08

## 2022-03-13 RX ADMIN — ASPIRIN 81 MG CHEWABLE TABLET 81 MG: 81 TABLET CHEWABLE at 08:18

## 2022-03-13 RX ADMIN — CLONAZEPAM 0.5 MG: 0.5 TABLET ORAL at 17:41

## 2022-03-13 RX ADMIN — OXYCODONE HYDROCHLORIDE 10 MG: 10 TABLET ORAL at 05:05

## 2022-03-13 RX ADMIN — HYDROXYZINE HYDROCHLORIDE 50 MG: 25 TABLET ORAL at 08:19

## 2022-03-13 RX ADMIN — POTASSIUM CHLORIDE 20 MEQ: 1500 TABLET, EXTENDED RELEASE ORAL at 08:18

## 2022-03-13 RX ADMIN — OXYCODONE HYDROCHLORIDE 10 MG: 10 TABLET ORAL at 15:03

## 2022-03-13 RX ADMIN — QUETIAPINE FUMARATE 50 MG: 25 TABLET ORAL at 21:37

## 2022-03-13 RX ADMIN — BUSPIRONE HYDROCHLORIDE 20 MG: 10 TABLET ORAL at 08:19

## 2022-03-13 RX ADMIN — ACETAMINOPHEN 325MG 975 MG: 325 TABLET ORAL at 05:05

## 2022-03-13 RX ADMIN — LITHIUM CARBONATE 300 MG: 300 TABLET, FILM COATED, EXTENDED RELEASE ORAL at 21:36

## 2022-03-13 RX ADMIN — PRAZOSIN HYDROCHLORIDE 2 MG: 2 CAPSULE ORAL at 21:36

## 2022-03-13 RX ADMIN — BUSPIRONE HYDROCHLORIDE 20 MG: 10 TABLET ORAL at 21:37

## 2022-03-13 RX ADMIN — SENNOSIDES 17.2 MG: 8.6 TABLET ORAL at 21:36

## 2022-03-13 RX ADMIN — ACETAMINOPHEN 325MG 975 MG: 325 TABLET ORAL at 21:37

## 2022-03-13 RX ADMIN — PREGABALIN 150 MG: 75 CAPSULE ORAL at 21:37

## 2022-03-13 RX ADMIN — PREGABALIN 150 MG: 75 CAPSULE ORAL at 15:01

## 2022-03-13 NOTE — OCCUPATIONAL THERAPY NOTE
Occupational Therapy Treatment Note:         03/13/22 1055   OT Last Visit   OT Visit Date 03/13/22   Note Type   Note Type Treatment   Restrictions/Precautions   Weight Bearing Precautions Per Order Yes   RLE Weight Bearing Per Order WBAT   Other Precautions Fall Risk;Pain; Chair Alarm; Bed Alarm;Cognitive   Pain Assessment   Pain Assessment Tool 0-10   Pain Score 7   Pain Location/Orientation Orientation: Right;Location: Knee   ADL   Where Assessed Edge of bed   Grooming Assistance 5  Supervision/Setup   Grooming Deficit Setup;Verbal cueing; Increased time to complete;Wash/dry hands; Wash/dry face   Grooming Comments at [address]  4  Minimal Assistance   UB Bathing Deficit Setup;Verbal cueing;Supervision/safety; Increased time to complete; Chest;Right arm;Left arm; Abdomen   UB Bathing Comments cues to encourage good quality provided  LB Bathing Assistance 2  Maximal Assistance   LB Bathing Deficit Setup;Steadying;Verbal cueing;Supervision/safety; Increased time to complete; Buttocks;Right lower leg including foot; Left lower leg including foot; Perineal area   LB Bathing Comments limited functional reach/limited stand tolerance  UB Dressing Assistance 5  Supervision/Setup   UB Dressing Deficit Setup   LB Dressing Assistance 2  Maximal Assistance   LB Dressing Deficit Setup;Steadying; Requires assistive device for steadying;Verbal cueing;Supervision/safety; Increased time to complete; Don/doff L sock; Don/doff R sock;Pull up over hips   LB Dressing Comments limited functional reach/stand balance  Toileting Assistance  2  Maximal Assistance   Toileting Deficit Setup;Steadying;Verbal cueing; Increased time to complete;Supervison/safety; Bedside commode;Perineal hygiene   Toileting Comments with simulation  Functional Standing Tolerance   Time 2-3 MINS   Activity dynamic stand balance activities  Comments Pt with BLE buckling and weakness with activities  Inconsistent balance noted      Bed Mobility Supine to Sit 5  Supervision   Additional items Assist x 1;Bedrails;HOB elevated; Increased time required;Verbal cues   Additional Comments cues for safety required  Transfers   Sit to Stand 3  Moderate assistance   Additional items Assist x 1; Armrests; Bedrails; Increased time required;Verbal cues   Stand to Sit 3  Moderate assistance   Additional items Assist x 1; Armrests; Bedrails; Increased time required;Verbal cues   Stand pivot 3  Moderate assistance   Additional items Assist x 1;Bedrails;Armrests; Increased time required;Verbal cues   Toilet transfer 3  Moderate assistance   Additional items Assist x 2;Armrests; Increased time required;Verbal cues; Commode   Additional Comments increased A required to maintain safe balance with activities  Functional Mobility   Functional Mobility 3  Moderate assistance   Additional Comments x2   Additional items Rolling walker  (with chair follow  )   Cognition   Overall Cognitive Status Impaired   Arousal/Participation Alert; Responsive; Cooperative   Attention Attends with cues to redirect   Orientation Level Oriented to person;Oriented to place; Disoriented to time;Oriented to situation   Memory Decreased short term memory;Decreased recall of recent events;Decreased recall of precautions   Following Commands Follows one step commands without difficulty   Comments Pt able to make her needs known  Additional Activities   Additional Activities Other (Comment)  (reviewed current plan of care  )   Additional Activities Comments Pt reports having good understanding  Activity Tolerance   Activity Tolerance Patient limited by fatigue;Patient limited by pain   Medical Staff Made Aware reported all findings to nursing staff  Assessment   Assessment Pt was seen for skilled OT with focus on completion of bed mobility, self care tasks, functional transfers, review of fall prevention, basic orientation and review of current plan of care   The Pt was able to bring herself to the EOB with extended time provided  Increased A required to encourage thoroughness with UE self care  Max A overall for LE bathing/dressing due to limited functional reach/balance  See above levels of A required for all functional tasks  The Pt was able to tolerate transfer to bedside recliner with chair alarm activated upon termination of tx session The patient's raw score on the AM-PAC Daily Activity inpatient short form is 13, standardized score is 32 03, less than 39 4  Patients at this level are likely to benefit from discharge to post-acute rehabilitation services  Plan   Treatment Interventions ADL retraining;Functional transfer training; Endurance training;UE strengthening/ROM; Cognitive reorientation;Patient/family training   Goal Expiration Date 03/24/22   OT Treatment Day 2   OT Frequency 3-5x/wk   Recommendation   OT Discharge Recommendation Post acute rehabilitation services   Equipment Recommended Bedside commode   Additional Comments  suspected to need long term rehab due to limited family support      AM-PAC Daily Activity Inpatient   Lower Body Dressing 2   Bathing 2   Toileting 1   Upper Body Dressing 2   Grooming 3   Eating 3   Daily Activity Raw Score 13   Daily Activity Standardized Score (Calc for Raw Score >=11) 32 03   AM-PAC Applied Cognition Inpatient   Following a Speech/Presentation 3   Understanding Ordinary Conversation 3   Taking Medications 3   Remembering Where Things Are Placed or Put Away 3   Remembering List of 4-5 Errands 3   Taking Care of Complicated Tasks 3   Applied Cognition Raw Score 18   Applied Cognition Standardized Score 38 07   Collette Flores, [address]

## 2022-03-13 NOTE — PROGRESS NOTES
[address] [facility]  Progress Note - Luis Eduardo Schaffer 1965, 60 y o  female MRN: [id number]  Unit/Bed#: E2 -02 Encounter: [id number]  Primary Care Provider: Imani Hampton DO   Date and time admitted to hospital: 3/9/2022  9:14 AM    * S/P total knee arthroplasty, right  Assessment & Plan  Status post 3/9 right total knee arthroplasty  PT and OT currently recommend rehab  H/H stable  DVTppx with lovenox, will need 30 days  Follow up with ortho in 2 weeks  Psych consulted for evaluation, will need option for placement    Tardive dyskinesia  Assessment & Plan  Continue Lyrica  Valbenazine not on formulary, Family notified to bring medication in  Discussed with psychiatric pharmacist, recommending clonazepam 0 5 mg b i d  P r n      Essential hypertension  Assessment & Plan  Continue inderal and prazosin  BP currently controlled    Generalized anxiety disorder  Assessment & Plan  Continue Paxil and BuSpar    Esophageal reflux  Assessment & Plan  Continue PPI    Bipolar II disorder (HCC)  Assessment & Plan  Follows with Psychiatry outpatient  Continue lithium, and Seroquel bedtime    Lumbar radiculopathy  Assessment & Plan  · s/p thoracolumbar decompression and fusion in 2018 in [place] with subsequent laminectomy syndrome   · S/p thoracic spinal cord stimulator placement in 7/28/20  · Recommend continue follow-up with Neurosurgery outpatient for further evaluation as patient does not use spinal stimulator at this time      Chronic pain disorder  Assessment & Plan  History of opioid dependence, reporting bilateral knee pain  Caution regarding significant opioids for pain control  Recommend topical agents, Voltaren, diclofenac, Lidoderm patches  Minimize oxycodone as needed to work with physical therapy        VTE Pharmacologic Prophylaxis:   Pharmacologic: Enoxaparin (Lovenox)  Mechanical VTE Prophylaxis in Place: Yes    Patient Centered Rounds: I have performed bedside rounds with nursing staff today  Discussions with Specialists or Other Care Team Provider: Ortho    Education and Discussions with Family / Patient: patient    Time Spent for Care: 30 minutes  More than 50% of total time spent on counseling and coordination of care as described above  Current Length of Stay: 3 day(s)    Current Patient Status: Inpatient   Certification Statement: The patient will continue to require additional inpatient hospital stay due to pending placement    Discharge Plan: Pending psych eval for optioning in STR    Code Status: Level 1 - Full Code      Subjective:   No events overnight, doing well  Pain has been controlled  Seen and evaluated at bedside  Denies any CP or SOB  Objective:     Vitals:   Temp (24hrs), Av 5 °F (36 4 °C), Min:97 2 °F (36 2 °C), Max:97 8 °F (36 6 °C)    Temp:  [97 2 °F (36 2 °C)-97 8 °F (36 6 °C)] 97 2 °F (36 2 °C)  HR:  [] 85  Resp:  [18] 18  BP: (114-125)/(67-80) 125/76  SpO2:  [97 %-99 %] 99 %  Body mass index is 34 96 kg/m²  Input and Output Summary (last 24 hours): Intake/Output Summary (Last 24 hours) at 3/13/2022 1331  Last data filed at 3/13/2022 0501  Gross per 24 hour   Intake --   Output 1200 ml   Net -1200 ml       Physical Exam:     Physical Exam  Vitals and nursing note reviewed  Constitutional:       Appearance: She is obese  HENT:      Head: Normocephalic and atraumatic  Mouth/Throat:      Comments: Tongue smacking  Eyes:      General: No scleral icterus  Conjunctiva/sclera: Conjunctivae normal    Cardiovascular:      Rate and Rhythm: Normal rate and regular rhythm  Heart sounds: Normal heart sounds  Pulmonary:      Effort: Pulmonary effort is normal  No respiratory distress  Breath sounds: Normal breath sounds  No wheezing  Abdominal:      General: Bowel sounds are normal  There is no distension  Palpations: Abdomen is soft  Musculoskeletal:         General: No swelling  Right lower leg: No edema  Left lower leg: No edema  Skin:     General: Skin is warm and dry  Neurological:      Mental Status: She is alert  Mental status is at baseline  Comments: Dysarthric           Additional Data:     Labs:    Results from last 7 days   Lab Units 03/12/22  0456   WBC Thousand/uL 7 63   HEMOGLOBIN g/dL 9 9*   HEMATOCRIT % 30 2*   PLATELETS Thousands/uL 269   NEUTROS PCT % 61   LYMPHS PCT % 22   MONOS PCT % 12   EOS PCT % 5     Results from last 7 days   Lab Units 03/12/22  0456   SODIUM mmol/L 137   POTASSIUM mmol/L 4 0   CHLORIDE mmol/L 105   CO2 mmol/L 25   BUN mg/dL 13   CREATININE mg/dL 0 89   ANION GAP mmol/L 7   CALCIUM mg/dL 8 0*   GLUCOSE RANDOM mg/dL 102                           * I Have Reviewed All Lab Data Listed Above  * Additional Pertinent Lab Tests Reviewed:  [address] Admission Reviewed    Imaging:    None    Recent Cultures (last 7 days):           Last 24 Hours Medication List:   Current Facility-Administered Medications   Medication Dose Route Frequency Provider Last Rate    acetaminophen  975 mg Oral [facility] [address] [place], [place]      ascorbic acid  500 mg Oral Daily [address] Robles PA-C      aspirin  81 mg Oral Daily [address], [place]      atorvastatin  40 mg Oral Daily With AdoTube JACQUI Seaman      busPIRone  20 mg Oral TID Modesto Harris PA-C      calcium carbonate  1,000 mg Oral Daily PRN Modesto Harris PA-C      clonazePAM  0 5 mg Oral BID MD Odalis Olson ON 3/14/2022] clonazePAM  0 5 mg Oral BID PRN Home Omer MD      enoxaparin  40 mg Subcutaneous Daily [address] Robles PA-C      folic acid  1 mg Oral Daily [address], [place]      HYDROmorphone  0 5 mg Intravenous Q4H PRN [address] Singh PA-C      hydrOXYzine HCL  50 mg Oral TID PRN Modesto Harris PA-C      lithium carbonate  300 mg Oral HS Modesto Harris PA-C      mirtazapine  7 5 mg Oral HS PRN Modesto Harris PA-C      oxyCODONE  10 mg Oral Q4H PRN [address] JACQUI Singh      oxyCODONE  5 mg Oral Q4H PRN Sola Robles PA-C      pantoprazole  20 mg Oral Early Morning [place], [place]      PARoxetine  60 mg Oral Daily [place], [place]      polyethylene glycol  17 g Oral Daily Federico Toney MD      potassium chloride  20 mEq Oral Daily [place], [place]      prazosin  2 mg Oral HS [place], [place]      pregabalin  150 mg Oral TID [facility] Albino Robles PA-C      promethazine  25 mg Intramuscular Q6H PRN Sola Singh PA-C      propranolol  20 mg Oral BID before breakfast/lunch Yudi Del Cid PA-C      QUEtiapine  50 mg Oral HS Yudi Del Cid PA-C      senna  2 tablet Oral HS Federico Toney MD      Valbenazine Tosylate  80 mg Oral Daily [place], [place]          Today, Patient Was Seen By: Federico Toney MD    ** Please Note: Dictation voice to text software may have been used in the creation of this document   **

## 2022-03-13 NOTE — PLAN OF CARE
Problem: PHYSICAL THERAPY ADULT  Goal: Performs mobility at highest level of function for planned discharge setting  See evaluation for individualized goals  Description: Treatment/Interventions: Functional transfer training,LE strengthening/ROM,Elevations,Therapeutic exercise,Cognitive reorientation,Patient/family training,Equipment eval/education,Bed mobility,Gait training,Compensatory technique education,Continued evaluation,Spoke to nursing          See flowsheet documentation for full assessment, interventions and recommendations  Outcome: Progressing  Note: Prognosis: Fair  Problem List: Decreased strength,Decreased range of motion,Decreased endurance,Impaired balance,Decreased mobility,Decreased cognition,Impaired judgement,Decreased safety awareness,Obesity,Decreased skin integrity,Orthopedic restrictions,Pain  Assessment: Pt  sleeping upon arrival   arouseable but drowsy initialy as session progressed pt became gradually more awake and alert  Pt  Performs a-aarom exercises to r le x 12 reps for TKR  Tactile and verbal cues for correct exercise techniques  Pt  Performs supine to sit eob with supervision increased time, cues and use of bedrail  Pt is requiring mod assist x2 for all transfers cues for hand placement and cues to push through L le  Pt progressed with ambulation distances to 10' x2 with mod assist x2 cues for sequencing of le's and walker  Cues for ue weightbearing, upright posture and safety  Noted mild buckling of R knee cues to tighten quad during stance phase of gait  Pt  Continues to be limited by fatigue, pain,  Decreased activity tolerance, balance, safety, and gait deviations  Pt remains at increased risk for falls  The patient's AM-PAC Basic Mobility Inpatient Short Form Raw Score is 12  A Raw score of less than or equal to 16 suggests the patient may benefit from discharge to post-acute rehabilitation services   Please also refer to the recommendation of the Physical Therapist for safe discharge planning  PT  WILL BENEFIT FROM CONTINUED INPT PT AND STR AT D/C IN ORDER TO MAXIMIZE FUNCTIONAL OUTCOMES, MOBILITY AND INDEPENDENCE IN ORDER TO FACILITATE RETURN TO PLOF  Barriers to Discharge: Decreased caregiver support,Inaccessible home environment  Barriers to Discharge Comments: zoe? level of supervision at home? PT Discharge Recommendation: Post acute rehabilitation services          See flowsheet documentation for full assessment

## 2022-03-13 NOTE — PROGRESS NOTES
Orthopedics     Samantha Bennett Alpha 60 y o  female MRN: [id number]  Unit/Bed#: E2 -02 Encounter: [id number]      Subjective:  Pt seen and evaluated  She has still not received her Valbenazine tosylate from her family so history is limited  She still notes right knee pain  No fevers or chills      Labs:  0   Lab Value Date/Time    HCT 30 2 (L) 03/12/2022 0456    HCT 29 4 (L) 03/11/2022 0537    HCT 34 4 (L) 03/10/2022 0515    HCT 41 9 10/26/2015 1123    HCT 39 9 02/22/2014 0615    HCT 38 2 02/21/2014 0533    HGB 9 9 (L) 03/12/2022 0456    HGB 10 1 (L) 03/11/2022 0537    HGB 11 6 03/10/2022 0515    HGB 14 9 10/26/2015 1123    HGB 13 7 02/22/2014 0615    HGB 13 0 02/21/2014 0533    INR 1 02 03/05/2022 0854    WBC 7 63 03/12/2022 0456    WBC 6 71 03/11/2022 0537    WBC 10 11 03/10/2022 0515    WBC 4 50 10/26/2015 1123    WBC 4 52 02/22/2014 0615    WBC 4 79 02/21/2014 0533    ESR 16 03/08/2020 2129    CRP 7 5 03/05/2022 0854       Meds:    Current Facility-Administered Medications:     acetaminophen (TYLENOL) tablet 975 mg, 975 mg, Oral, Q8H [address], SHAKILA Barbosa-DAMIAN, 975 mg at 03/13/22 0505    ascorbic acid (VITAMIN C) tablet 500 mg, 500 mg, Oral, Daily, Joel Singh PA-C, 500 mg at 03/13/22 6532    aspirin chewable tablet 81 mg, 81 mg, Oral, Daily, Joel Singh PA-C, 81 mg at 03/13/22 0818    atorvastatin (LIPITOR) tablet 40 mg, 40 mg, Oral, Daily With Dinner, Peggy Miller PA-C, 40 mg at 03/12/22 1657    busPIRone (BUSPAR) tablet 20 mg, 20 mg, Oral, TID, Joel Singh PA-C, 20 mg at 03/13/22 6811    calcium carbonate (TUMS) chewable tablet 1,000 mg, 1,000 mg, Oral, Daily PRN, Joel Singh PA-C    clonazePAM (KlonoPIN) tablet 0 5 mg, 0 5 mg, Oral, BID, Earline Sapp MD, 0 5 mg at 03/13/22 0818    enoxaparin (LOVENOX) subcutaneous injection 40 mg, 40 mg, Subcutaneous, Daily, Joel Singh PA-C, 40 mg at 32/83/74 2772    folic acid (FOLVITE) tablet 1 mg, 1 mg, Oral, Daily, Peggy Miller, JACQUI, 1 mg at 03/13/22 0818    HYDROmorphone (DILAUDID) injection 0 5 mg, 0 5 mg, Intravenous, Q4H PRN, Adrián Singh PA-C, 0 5 mg at 03/12/22 1706    hydrOXYzine HCL (ATARAX) tablet 50 mg, 50 mg, Oral, TID PRN, Adrián Singh PA-C, 50 mg at 03/13/22 0819    lithium carbonate (LITHOBID) CR tablet 300 mg, 300 mg, Oral, HS, Adrián Singh PA-C, 300 mg at 03/12/22 2116    mirtazapine (REMERON) tablet 7 5 mg, 7 5 mg, Oral, HS PRN, Adrián Singh PA-C, 7 5 mg at 03/09/22 2119    oxyCODONE (ROXICODONE) immediate release tablet 10 mg, 10 mg, Oral, Q4H PRN, Adrián Singh PA-C, 10 mg at 03/13/22 0908    oxyCODONE (ROXICODONE) IR tablet 5 mg, 5 mg, Oral, Q4H PRN, Adrián Singh PA-C, 5 mg at 03/11/22 1125    pantoprazole (PROTONIX) EC tablet 20 mg, 20 mg, Oral, Early Morning, Adrián Singh PA-C, 20 mg at 03/13/22 0505    PARoxetine (PAXIL) tablet 60 mg, 60 mg, Oral, Daily, Adrián Singh PA-C, 60 mg at 03/13/22 0818    polyethylene glycol (MIRALAX) packet 17 g, 17 g, Oral, Daily, Cinthia Holloway MD, 17 g at 03/13/22 0818    potassium chloride (K-DUR,KLOR-CON) CR tablet 20 mEq, 20 mEq, Oral, Daily, Adrián Singh PA-C, 20 mEq at 03/13/22 0818    prazosin (MINIPRESS) capsule 2 mg, 2 mg, Oral, HS, Adrián Singh PA-C, 2 mg at 03/12/22 2116    pregabalin (LYRICA) capsule 150 mg, 150 mg, Oral, TID, Adrián Singh PA-C, 150 mg at 03/13/22 0818    promethazine (PHENERGAN) injection 25 mg, 25 mg, Intramuscular, Q6H PRN, Adrián Singh PA-C    propranolol (INDERAL) tablet 20 mg, 20 mg, Oral, BID before breakfast/lunch, Adrián Singh PA-C, 20 mg at 03/11/22 1126    QUEtiapine (SEROquel) tablet 50 mg, 50 mg, Oral, HS, Adrián Singh PA-C, 50 mg at 03/12/22 2116    senna (SENOKOT) tablet 17 2 mg, 2 tablet, Oral, HS, Cinthia Holloway MD, 17 2 mg at 03/12/22 2116    Valbenazine Tosylate CAPS 80 mg, 80 mg, Oral, Daily, Pal Figueroa PA-C    Physical exam:  Vitals:    03/13/22 0730   BP: 125/67   Pulse: 103   Resp: 18   Temp: (!) 97 2 °F (36 2 °C)   SpO2: 99%     Right Knee:  · Incision C/D/I  No erythema or drainage  Dressing changed  · ROM is extension -3, flexion 90  · Stable to varus and valgus stress  · Motor/sensation intact distally  · Palpable DP pulse     Assessment: 60 y  o female POD 4 s/p R TKA, Tardive Dyskinesia    Plan:  · Pain Control  · WBAT RLE  · PT/OT  · DVT prophylaxis, Lovenox  · Continue to monitor clinically for signs of ABLA  · Tardive Dyskinesia  Awaiting medication to be brought in by family  Psych also consulted  · Stable for d/c from orthopedic standpoint  Pending Placement  Follow up with Dr Eda Reveles as outpt      Amanda Clinton PA-C

## 2022-03-13 NOTE — PLAN OF CARE
Problem: OCCUPATIONAL THERAPY ADULT  Goal: Performs self-care activities at highest level of function for planned discharge setting  See evaluation for individualized goals  Description: Occupational Therapy  Outcome: Progressing  Note: Limitation: Decreased ADL status,Decreased UE strength,Decreased Safe judgement during ADL,Decreased cognition,Decreased endurance,Decreased high-level ADLs  Prognosis: Good  Assessment: Pt was seen for skilled OT with focus on completion of bed mobility, self care tasks, functional transfers, review of fall prevention, basic orientation and review of current plan of care  The Pt was able to bring herself to the EOB with extended time provided  Increased A required to encourage thoroughness with UE self care  Max A overall for LE bathing/dressing due to limited functional reach/balance  See above levels of A required for all functional tasks  The Pt was able to tolerate transfer to bedside recliner with chair alarm activated upon termination of tx session The patient's raw score on the AM-PAC Daily Activity inpatient short form is 13, standardized score is 32 03, less than 39 4  Patients at this level are likely to benefit from discharge to post-acute rehabilitation services        OT Discharge Recommendation: Post acute rehabilitation services

## 2022-03-13 NOTE — PLAN OF CARE
Problem: MOBILITY - ADULT  Goal: Maintain or return to baseline ADL function  Description: INTERVENTIONS:  -  Assess patient's ability to carry out ADLs; assess patient's baseline for ADL function and identify physical deficits which impact ability to perform ADLs (bathing, care of mouth/teeth, toileting, grooming, dressing, etc )  - Assess/evaluate cause of self-care deficits   - Assess range of motion  - Assess patient's mobility; develop plan if impaired  - Assess patient's need for assistive devices and provide as appropriate  - Encourage maximum independence but intervene and supervise when necessary  - Involve family in performance of ADLs  - Assess for home care needs following discharge   - Consider OT consult to assist with ADL evaluation and planning for discharge  - Provide patient education as appropriate  Outcome: Progressing     Problem: MOBILITY - ADULT  Goal: Maintains/Returns to pre admission functional level  Description: INTERVENTIONS:  - Perform BMAT or MOVE assessment daily    - Set and communicate daily mobility goal to care team and patient/family/caregiver  - Collaborate with rehabilitation services on mobility goals if consulted  - Perform Range of Motion 3 times a day  - Reposition patient every 2 hours    - Dangle patient 3 times a day  - Stand patient 3 times a day  - Ambulate patient 3 times a day  - Out of bed to chair 3 times a day   - Out of bed for meals 3 times a day  - Out of bed for toileting  - Record patient progress and toleration of activity level   Outcome: Progressing     Problem: Potential for Falls  Goal: Patient will remain free of falls  Description: INTERVENTIONS:  - Educate patient/family on patient safety including physical limitations  - Instruct patient to call for assistance with activity   - Consult OT/PT to assist with strengthening/mobility   - Keep Call bell within reach  - Keep bed low and locked with side rails adjusted as appropriate  - Keep care items and personal belongings within reach  - Initiate and maintain comfort rounds  - Make Fall Risk Sign visible to staff  - Offer Toileting every 2 Hours, in advance of need  - Initiate/Maintain bed alarm  - Obtain necessary fall risk management equipment: bed alarm  - Apply yellow socks and bracelet for high fall risk patients  - Consider moving patient to room near nurses station  Outcome: Progressing     Problem: PAIN - ADULT  Goal: Verbalizes/displays adequate comfort level or baseline comfort level  Description: Interventions:  - Encourage patient to monitor pain and request assistance  - Assess pain using appropriate pain scale  - Administer analgesics based on type and severity of pain and evaluate response  - Implement non-pharmacological measures as appropriate and evaluate response  - Consider cultural and social influences on pain and pain management  - Notify physician/advanced practitioner if interventions unsuccessful or patient reports new pain  Outcome: Progressing     Problem: DISCHARGE PLANNING  Goal: Discharge to home or other facility with appropriate resources  Description: INTERVENTIONS:  - Identify barriers to discharge w/patient and caregiver  - Arrange for needed discharge resources and transportation as appropriate  - Identify discharge learning needs (meds, wound care, etc )  - Arrange for interpretive services to assist at discharge as needed  - Refer to Case Management Department for coordinating discharge planning if the patient needs post-hospital services based on physician/advanced practitioner order or complex needs related to functional status, cognitive ability, or social support system  Outcome: Progressing     Problem: Knowledge Deficit  Goal: Patient/family/caregiver demonstrates understanding of disease process, treatment plan, medications, and discharge instructions  Description: Complete learning assessment and assess knowledge base    Interventions:  - Provide teaching at level of understanding  - Provide teaching via preferred learning methods  Outcome: Progressing     Problem: Prexisting or High Potential for Compromised Skin Integrity  Goal: Skin integrity is maintained or improved  Description: INTERVENTIONS:  - Identify patients at risk for skin breakdown  - Assess and monitor skin integrity  - Assess and monitor nutrition and hydration status  - Monitor labs   - Assess for incontinence   - Turn and reposition patient  - Assist with mobility/ambulation  - Relieve pressure over bony prominences  - Avoid friction and shearing  - Provide appropriate hygiene as needed including keeping skin clean and dry  - Evaluate need for skin moisturizer/barrier cream  - Collaborate with interdisciplinary team   - Patient/family teaching  - Consider wound care consult   Outcome: Progressing     Problem: NEUROSENSORY - ADULT  Goal: Achieves stable or improved neurological status  Description: INTERVENTIONS  - Monitor and report changes in neurological status  - Monitor vital signs such as temperature, blood pressure, glucose, and any other labs ordered   - Initiate measures to prevent increased intracranial pressure  - Monitor for seizure activity and implement precautions if appropriate      Outcome: Progressing  Goal: Achieves maximal functionality and self care  Description: INTERVENTIONS  - Monitor swallowing and airway patency with patient fatigue and changes in neurological status  - Encourage and assist patient to increase activity and self care     - Encourage visually impaired, hearing impaired and aphasic patients to use assistive/communication devices  Outcome: Progressing     Problem: SKIN/TISSUE INTEGRITY - ADULT  Goal: Incision(s), wounds(s) or drain site(s) healing without S/S of infection  Description: INTERVENTIONS  - Assess and document dressing, incision, wound bed, drain sites and surrounding tissue  - Provide patient and family education  - Perform skin care/dressing changes every shift  Outcome: Progressing     Problem: MUSCULOSKELETAL - ADULT  Goal: Maintain or return mobility to safest level of function  Description: INTERVENTIONS:  - Assess patient's ability to carry out ADLs; assess patient's baseline for ADL function and identify physical deficits which impact ability to perform ADLs (bathing, care of mouth/teeth, toileting, grooming, dressing, etc )  - Assess/evaluate cause of self-care deficits   - Assess range of motion  - Assess patient's mobility  - Assess patient's need for assistive devices and provide as appropriate  - Encourage maximum independence but intervene and supervise when necessary  - Involve family in performance of ADLs  - Assess for home care needs following discharge   - Consider OT consult to assist with ADL evaluation and planning for discharge  - Provide patient education as appropriate  Outcome: Progressing  Goal: Maintain proper alignment of affected body part  Description: INTERVENTIONS:  - Support, maintain and protect limb and body alignment  - Provide patient/ family with appropriate education  Outcome: Progressing

## 2022-03-13 NOTE — PHYSICAL THERAPY NOTE
PHYSICAL THERAPY NOTE          Patient Name: Mary Jo Killian  YJVWK'S Date: 3/13/2022     03/13/22 0945   Note Type   Note Type Treatment   Pain Assessment   Pain Assessment Tool 0-10   Pain Score 7   Pain Location/Orientation Orientation: Right;Location: Knee   Hospital Pain Intervention(s) Repositioned;Cold applied; Ambulation/increased activity; Emotional support; Rest   Restrictions/Precautions   RLE Weight Bearing Per Order WBAT   Other Precautions Cognitive; Chair Alarm; Bed Alarm; Fall Risk;Pain   General   Chart Reviewed Yes   Cognition   Overall Cognitive Status Impaired   Arousal/Participation Arousable   Attention Attends with cues to redirect   Orientation Level Oriented to person;Oriented to place; Disoriented to time  (oriented to day, month,  no exact date   " 1922")   Memory Decreased recall of precautions;Decreased recall of recent events;Decreased short term memory   Following Commands Follows one step commands with increased time or repetition   Subjective   Subjective did you give me something for itching  Bed Mobility   Supine to Sit 5  Supervision   Additional items Assist x 1;HOB elevated; Bedrails; Increased time required;Verbal cues   Transfers   Sit to Stand 3  Moderate assistance   Additional items Assist x 2;Armrests; Bedrails; Increased time required;Verbal cues   Stand to Sit 3  Moderate assistance   Additional items Assist x 2;Armrests; Increased time required;Verbal cues   Stand pivot 3  Moderate assistance   Additional items Assist x 2; Increased time required;Armrests   Toilet transfer 3  Moderate assistance   Additional items Assist x 2; Increased time required;Verbal cues; Commode   Ambulation/Elevation   Gait pattern Poor UE support; Improper Weight shift; Forward Flexion;Decreased foot clearance;Decreased R stance;R Knee Analilia; Short stride; Step to;Excessively slow   Gait Assistance 3  Moderate assist  (chair follow)   Additional items Assist x 2;Verbal cues   Assistive Device Rolling walker   Distance 10' x2, 3' X1 seated rest between gait trials  Balance   Static Sitting Fair   Dynamic Sitting Fair -   Static Standing Poor   Dynamic Standing Poor -   Ambulatory Poor -   Endurance Deficit   Endurance Deficit Yes   Activity Tolerance   Activity Tolerance Patient limited by pain; Patient limited by fatigue   Nurse Made Aware yes   Medical Staff Made Aware sanjeev alexis,    Jeanne   TKR Supine;AAROM;AROM; Right  (x 12 reps   qs, a/a hs, a/a slr, a/a hip abd /add  ap)   Assessment   Prognosis Fair   Problem List Decreased strength;Decreased range of motion;Decreased endurance; Impaired balance;Decreased mobility; Decreased cognition; Impaired judgement;Decreased safety awareness; Obesity; Decreased skin integrity;Orthopedic restrictions;Pain   Assessment Pt  sleeping upon arrival   arouseable but drowsy initialy as session progressed pt became gradually more awake and alert  Pt  Performs a-aarom exercises to r le x 12 reps for TKR  Tactile and verbal cues for correct exercise techniques  Pt  Performs supine to sit eob with supervision increased time, cues and use of bedrail  Pt is requiring mod assist x2 for all transfers cues for hand placement and cues to push through L le  Pt progressed with ambulation distances to 10' x2 with mod assist x2 cues for sequencing of le's and walker  Cues for ue weightbearing, upright posture and safety  Noted mild buckling of R knee cues to tighten quad during stance phase of gait  Pt  Continues to be limited by fatigue, pain,  Decreased activity tolerance, balance, safety, and gait deviations  Pt remains at increased risk for falls  The patient's AM-PAC Basic Mobility Inpatient Short Form Raw Score is 12  A Raw score of less than or equal to 16 suggests the patient may benefit from discharge to post-acute rehabilitation services   Please also refer to the recommendation of the Physical Therapist for safe discharge planning  PT  WILL BENEFIT FROM CONTINUED INPT PT AND STR AT D/C IN ORDER TO MAXIMIZE FUNCTIONAL OUTCOMES, MOBILITY AND INDEPENDENCE IN ORDER TO FACILITATE RETURN TO PLOF  Goals   Patient Goals to have less pain   STG Expiration Date 03/19/22   PT Treatment Day 8   Plan   Treatment/Interventions Functional transfer training;LE strengthening/ROM; Therapeutic exercise; Endurance training;Patient/family training;Cognitive reorientation;Equipment eval/education; Bed mobility;Gait training;Spoke to nursing;OT   Progress Slow progress, decreased activity tolerance  (pain)   PT Frequency Twice a day   Recommendation   PT Discharge Recommendation Post acute rehabilitation services   AM-PAC Basic Mobility Inpatient   Turning in Bed Without Bedrails 2   Lying on Back to Sitting on Edge of Flat Bed 3   Moving Bed to Chair 2   Standing Up From Chair 2   Walk in Room 2   Climb 3-5 Stairs 1   Basic Mobility Inpatient Raw Score 12   Basic Mobility Standardized Score 32 23   Highest Level Of Mobility   JH-HLM Goal 4: Move to chair/commode   JH-HLM Highest Level of Mobility 5: Stand (1 or more minutes)   JH-HLM Goal Achieved Yes   End of Consult   Patient Position at End of Consult Bedside chair; All needs within reach;Bed/Chair alarm activated   Fred aHrvey PTA

## 2022-03-13 NOTE — PLAN OF CARE
Problem: PHYSICAL THERAPY ADULT  Goal: Performs mobility at highest level of function for planned discharge setting  See evaluation for individualized goals  Description: Treatment/Interventions: Functional transfer training,LE strengthening/ROM,Elevations,Therapeutic exercise,Cognitive reorientation,Patient/family training,Equipment eval/education,Bed mobility,Gait training,Compensatory technique education,Continued evaluation,Spoke to nursing          See flowsheet documentation for full assessment, interventions and recommendations  3/13/2022 1510 by Leopoldo Nicole PTA  Outcome: Progressing  Note: Prognosis: Fair  Problem List: Decreased strength,Decreased range of motion,Decreased endurance,Impaired balance,Decreased mobility,Obesity,Decreased skin integrity,Orthopedic restrictions,Pain  Assessment: PT  OUT OF BED UPON ARRIVAL  PT REPORTS PAIN 8/10 TO B/L KNEES AND THIGHS  PT  PERFORMS SEATED B/LLE A-AROM X 12 REPS  PT TOLERATED FAIR  PT RESISTING MOVEMENT AT TIMES REQUIRING VERBAL CUES TO RELAX AND WORK WITH THERAPIST NOT AGAINST  Pt is requiring mod assist x2 for all transfers cues for hand placement and cues to push through L le  Pt ambulated distances of 3' and 6' x1 this session with mod assist x2 cues for sequencing of le's and walker  Cues for ue weightbearing, upright posture and safety  NO buckling of R knee noted this session  Pt returned to supine with mod assist x2 assistance to lift b/l le's up and into bed  Pt  Continues to be limited by fatigue, pain, Decreased activity tolerance, balance, safety, and gait deviations  Pt remains at increased risk for falls  The patient's AM-PAC Basic Mobility Inpatient Short Form Raw Score is 13  A Raw score of less than or equal to 16 suggests the patient may benefit from discharge to post-acute rehabilitation services  Please also refer to the recommendation of the Physical Therapist for safe discharge planning    PT  WILL BENEFIT FROM CONTINUED INPT PT AND STR AT D/C IN ORDER TO MAXIMIZE FUNCTIONAL OUTCOMES, MOBILITY AND INDEPENDENCE IN ORDER TO FACILITATE RETURN TO PLOF  Barriers to Discharge: Decreased caregiver support,Inaccessible home environment  Barriers to Discharge Comments: zoe? level of supervision at home? PT Discharge Recommendation: Post acute rehabilitation services          See flowsheet documentation for full assessment

## 2022-03-13 NOTE — PHYSICAL THERAPY NOTE
PHYSICAL THERAPY NOTE          Patient Name: Shandra PIMENTELQUMaritzaM Date: 3/13/2022     03/13/22 1407   Note Type   Note Type BID visit/treatment   Pain Assessment   Pain Assessment Tool 0-10   Pain Score 8   Pain Location/Orientation Orientation: Bilateral;Location: Knee  (AND B/L THIGHS)   Hospital Pain Intervention(s) Repositioned;Cold applied; Ambulation/increased activity; Emotional support   Restrictions/Precautions   RLE Weight Bearing Per Order WBAT   Other Precautions Cognitive; Chair Alarm; Bed Alarm; Fall Risk;Pain;WBS   General   Chart Reviewed Yes   Cognition   Overall Cognitive Status Unable to assess   Arousal/Participation Alert; Responsive; Cooperative   Attention Attends with cues to redirect   Orientation Level Oriented to person;Oriented to place;Oriented to time;Disoriented to situation  (ORIENTED TO DAY, MONTH )   Memory Decreased recall of precautions;Decreased recall of recent events;Decreased short term memory   Following Commands Follows one step commands with increased time or repetition   Subjective   Subjective i TRIED TO LIFT MY LEG UP BUT IT'S HARD  Bed Mobility   Sit to Supine 3  Moderate assistance   Additional items Assist x 2; Increased time required;Verbal cues;LE management   Transfers   Sit to Stand 3  Moderate assistance   Additional items Assist x 2;Armrests; Increased time required;Verbal cues   Stand to Sit 3  Moderate assistance   Additional items Assist x 2;Armrests; Increased time required;Verbal cues   Stand pivot 3  Moderate assistance   Additional items Assist x 2; Increased time required;Verbal cues   Toilet transfer 3  Moderate assistance   Additional items Assist x 2; Increased time required;Commode;Verbal cues;Armrests   Ambulation/Elevation   Gait pattern Improper Weight shift; Poor UE support; Forward Flexion;Decreased foot clearance; Antalgic;Decreased R stance; Short stride; Step to;Excessively slow   Gait Assistance 3  Moderate assist   Additional items Assist x 2;Verbal cues   Assistive Device Rolling walker   Distance 3' X1, 6' X1   Balance   Static Sitting Fair   Dynamic Sitting Fair -   Static Standing Poor   Dynamic Standing Poor -   Ambulatory Poor -   Activity Tolerance   Activity Tolerance Patient limited by pain; Patient limited by fatigue   Exercises   Heelslides Sitting;AROM; Right;AAROM  (X 12 REPS)   Hip Flexion Sitting;AAROM;AROM; Right  (X12 REPS)   Hip Adduction Sitting;Bilateral  (ISOMETRIC HIP ADD  X 12 REPS)   Knee Extension Stretch Sitting;AAROM;AROM; Right  (X 12 REPS  )   Ankle Pumps Sitting;10 reps;AROM; Bilateral;AAROM   Assessment   Prognosis Fair   Problem List Decreased strength;Decreased range of motion;Decreased endurance; Impaired balance;Decreased mobility;Obesity; Decreased skin integrity;Orthopedic restrictions;Pain   Assessment PT  OUT OF BED UPON ARRIVAL  PT REPORTS PAIN 8/10 TO B/L KNEES AND THIGHS  PT  PERFORMS SEATED B/LLE A-AROM X 12 REPS  PT TOLERATED FAIR  PT RESISTING MOVEMENT AT TIMES REQUIRING VERBAL CUES TO RELAX AND WORK WITH THERAPIST NOT AGAINST  Pt is requiring mod assist x2 for all transfers cues for hand placement and cues to push through L le   Pt ambulated distances of 3' and 6' x1 this session with mod assist x2 cues for sequencing of le's and walker   Cues for ue weightbearing, upright posture and safety  NO buckling of R knee noted this session  Pt returned to supine with mod assist x2 assistance to lift b/l le's up and into bed      Pt  Continues to be limited by fatigue, pain, Decreased activity tolerance, balance, safety, and gait deviations  Pt remains at increased risk for falls  The patient's AM-PAC Basic Mobility Inpatient Short Form Raw Score is 13  A Raw score of less than or equal to 16 suggests the patient may benefit from discharge to post-acute rehabilitation services   Please also refer to the recommendation of the Physical Therapist for safe discharge planning   PT  WILL BENEFIT FROM CONTINUED INPT PT AND STR AT D/C IN ORDER TO MAXIMIZE FUNCTIONAL OUTCOMES, MOBILITY AND INDEPENDENCE IN ORDER TO FACILITATE RETURN TO PLOF  Goals   Patient Goals TO HAVE LESS PAIN   STG Expiration Date 03/19/22   PT Treatment Day 9   Plan   Treatment/Interventions Functional transfer training;LE strengthening/ROM; Therapeutic exercise; Endurance training;Cognitive reorientation;Patient/family training;Equipment eval/education; Bed mobility;Gait training;Spoke to nursing   Progress Slow progress, decreased activity tolerance   PT Frequency Twice a day   Recommendation   PT Discharge Recommendation Post acute rehabilitation services   AM-PAC Basic Mobility Inpatient   Turning in Bed Without Bedrails 3   Lying on Back to Sitting on Edge of Flat Bed 3   Moving Bed to Chair 2   Standing Up From Chair 2   Walk in Room 2   Climb 3-5 Stairs 1   Basic Mobility Inpatient Raw Score 13   Basic Mobility Standardized Score 33 99   Highest Level Of Mobility   JH-HL Goal 4: Move to chair/commode   JH-HLM Highest Level of Mobility 5: Stand (1 or more minutes)   JH-HLM Goal Achieved Yes   End of Consult   Patient Position at End of Consult Supine;Bed/Chair alarm activated; All needs within reach   End of Consult Comments ICE TO r KNEE, SCD'S TO B/L LE'S       Guero Sparks, PTA

## 2022-03-14 ENCOUNTER — APPOINTMENT (OUTPATIENT)
Dept: PHYSICAL THERAPY | Facility: CLINIC | Age: 57
End: 2022-03-14
Payer: MEDICARE

## 2022-03-14 PROCEDURE — 97110 THERAPEUTIC EXERCISES: CPT

## 2022-03-14 PROCEDURE — 97530 THERAPEUTIC ACTIVITIES: CPT

## 2022-03-14 PROCEDURE — 97116 GAIT TRAINING THERAPY: CPT

## 2022-03-14 PROCEDURE — 99221 1ST HOSP IP/OBS SF/LOW 40: CPT | Performed by: PSYCHIATRY & NEUROLOGY

## 2022-03-14 PROCEDURE — NC001 PR NO CHARGE: Performed by: PSYCHIATRY & NEUROLOGY

## 2022-03-14 PROCEDURE — 99233 SBSQ HOSP IP/OBS HIGH 50: CPT | Performed by: INTERNAL MEDICINE

## 2022-03-14 RX ORDER — PROPRANOLOL HYDROCHLORIDE 20 MG/1
10 TABLET ORAL
Status: DISCONTINUED | OUTPATIENT
Start: 2022-03-15 | End: 2022-03-17 | Stop reason: HOSPADM

## 2022-03-14 RX ORDER — CLONAZEPAM 0.5 MG/1
0.5 TABLET ORAL
Status: DISCONTINUED | OUTPATIENT
Start: 2022-03-14 | End: 2022-03-14

## 2022-03-14 RX ADMIN — PREGABALIN 150 MG: 75 CAPSULE ORAL at 16:54

## 2022-03-14 RX ADMIN — SENNOSIDES 17.2 MG: 8.6 TABLET ORAL at 21:40

## 2022-03-14 RX ADMIN — QUETIAPINE FUMARATE 50 MG: 25 TABLET ORAL at 21:40

## 2022-03-14 RX ADMIN — ATORVASTATIN CALCIUM 40 MG: 40 TABLET, FILM COATED ORAL at 16:54

## 2022-03-14 RX ADMIN — ENOXAPARIN SODIUM 40 MG: 40 INJECTION SUBCUTANEOUS at 01:06

## 2022-03-14 RX ADMIN — ASPIRIN 81 MG CHEWABLE TABLET 81 MG: 81 TABLET CHEWABLE at 09:29

## 2022-03-14 RX ADMIN — PREGABALIN 150 MG: 75 CAPSULE ORAL at 21:42

## 2022-03-14 RX ADMIN — OXYCODONE HYDROCHLORIDE 10 MG: 10 TABLET ORAL at 07:45

## 2022-03-14 RX ADMIN — BUSPIRONE HYDROCHLORIDE 20 MG: 10 TABLET ORAL at 09:28

## 2022-03-14 RX ADMIN — BUSPIRONE HYDROCHLORIDE 20 MG: 10 TABLET ORAL at 16:54

## 2022-03-14 RX ADMIN — CLONAZEPAM 0.5 MG: 0.5 TABLET ORAL at 09:28

## 2022-03-14 RX ADMIN — OXYCODONE HYDROCHLORIDE 10 MG: 10 TABLET ORAL at 01:19

## 2022-03-14 RX ADMIN — ACETAMINOPHEN 325MG 975 MG: 325 TABLET ORAL at 05:08

## 2022-03-14 RX ADMIN — POLYETHYLENE GLYCOL 3350 17 G: 17 POWDER, FOR SOLUTION ORAL at 09:30

## 2022-03-14 RX ADMIN — PREGABALIN 150 MG: 75 CAPSULE ORAL at 09:28

## 2022-03-14 RX ADMIN — OXYCODONE HYDROCHLORIDE 10 MG: 10 TABLET ORAL at 20:29

## 2022-03-14 RX ADMIN — PAROXETINE 60 MG: 20 TABLET, FILM COATED ORAL at 09:29

## 2022-03-14 RX ADMIN — OXYCODONE HYDROCHLORIDE 10 MG: 10 TABLET ORAL at 13:41

## 2022-03-14 RX ADMIN — POTASSIUM CHLORIDE 20 MEQ: 1500 TABLET, EXTENDED RELEASE ORAL at 09:28

## 2022-03-14 RX ADMIN — OXYCODONE HYDROCHLORIDE AND ACETAMINOPHEN 500 MG: 500 TABLET ORAL at 09:28

## 2022-03-14 RX ADMIN — HYDROXYZINE HYDROCHLORIDE 50 MG: 25 TABLET ORAL at 16:54

## 2022-03-14 RX ADMIN — BUSPIRONE HYDROCHLORIDE 20 MG: 10 TABLET ORAL at 21:38

## 2022-03-14 RX ADMIN — ACETAMINOPHEN 325MG 975 MG: 325 TABLET ORAL at 14:23

## 2022-03-14 RX ADMIN — ACETAMINOPHEN 325MG 975 MG: 325 TABLET ORAL at 21:36

## 2022-03-14 RX ADMIN — PANTOPRAZOLE SODIUM 20 MG: 20 TABLET, DELAYED RELEASE ORAL at 05:08

## 2022-03-14 RX ADMIN — FOLIC ACID 1 MG: 1 TABLET ORAL at 09:28

## 2022-03-14 RX ADMIN — LITHIUM CARBONATE 300 MG: 300 TABLET, FILM COATED, EXTENDED RELEASE ORAL at 21:38

## 2022-03-14 NOTE — DISCHARGE INSTRUCTIONS
TOTAL KNEE REPLACEMENT DISCHARGE INSTRUCTIONS    Surgical Dressing:  Change your dressing daily until drainage stops  Do not put any lotions or creams on your incision  If there are any signs of infection such as drainage persisting beyond 7 days, unusual looking drainage (yellow, green), increased redness around the incision, or fever/chills let your doctor know  Do not get your incision wet  Medications:  Upon discharge you will be given a prescription for an anticoagulant (i e  Ecotrin (Aspirin), Coumadin (Warfarin), Lovenox (Enoxaparin))  Take as prescribed  Do not take any over the counter NSAIDs (i e  Ibuprofen, Motrin, Advil, Naprosyn, Aleve) while on your anticoagulation medication unless otherwise specified by your surgeon  You will also be given Gabapentin if appropriate and a narcotic pain reliever for pain  Please be mindful of the number of pills you have left  You can only get a refill Monday-Friday during business hours from your surgeon or the physician assistant  You will not be given any refills on nights and weekends  Call ahead if needed  Please include tylenol in your pain regimen as well  You will also be given Colace to prevent constipation  Please include other over- the- counter medications for constipation if needed  If you are on Coumadin (Warfarin) you will need your blood drawn every Monday and Thursday once you are home  Initially your visiting nurse will draw your blood  Later you will go to an outpatient lab  You will receive a phone call the next day and your Coumadin (warfarin) will be dosed based on these results  Take this dosage daily until you hear from us next  Walking:  Use two crutches or a walker for EVERY step  Gradually increase your walking daily  You can progress to a cane as tolerated once advised by your physical therapist   Be sure to work on advancing your range of motion daily        Bathing:  Do not get your incision wet until follow up in the office  No tub baths  Do not submerge your incision  You may shower but you must cover your incision so it does NOT get wet  Gauze and Tegaderm dressing can be used to cover your incision  These can be found at the drug store  Use your crutches/walker to get in and out of the shower  Use a chair if needed  Sexual Relations:  Resume according to your comfort  Swimming:  No swimming or hot tubs until approved by your physician  Swimming will be allowed once your incision is well healed  Driving: You may ride as a passenger now  No driving until your follow-up appointment  To get into the car use the front passenger seat with the seat pushed back as far as possible  Scoot yourself back in the seat  Use your hands to assist your legs into the car  Physical therapy:  Continue with physical therapy  If you are doing home physical therapy please contact the office for a prescription for outpatient physical therapy when you are ready or at your first postoperative appointment  Special considerations: To minimize swelling, stiffness, and decrease pain use cold as needed, but not heat  Ice 20 minutes at a time with a cloth between your skin and the ice  Ice after walking, when you have pain, or after you have completed your exercises  Limit your sitting to 60 minutes at one time  Continue to wear your ANTIONE stockings 2 weeks after surgery  You can remove at night them to wash and reapply in the morning after sponge bath or shower (do not get your incision wet)  You can wear them as needed after that  If the stockings are too tight at the top you can cut the elastic  Follow up:  Call [phone] to make an appointment to see your surgeon within 2 weeks of your surgery if you havent done so already  If you have any questions during business hours please call the direct office phone number [phone]  Otherwise please call [phone] for any concerns        For the future:  Prior to any dental work, including routine cleanings, call the office for a prescription for antibiotics to be taken prior to your dental appointment  For any invasive procedures (i e  endoscopy, colonoscopy, etc ) inform the doctor performing the procedure that you have a total knee replacement and will antibiotics just prior to the procedure to protect it  Please call [facility] to change the address for your  Valbenazine at the end of this month   The number is: [phone]

## 2022-03-14 NOTE — CASE MANAGEMENT
Case Management Assessment & Discharge Planning Note    Patient name Samantha Fernandez 2 /E2 -* MRN [id number]  : 1965 Date 3/14/2022       Current Admission Date: 3/9/2022  Current Admission Diagnosis:S/P total knee arthroplasty, right   Patient Active Problem List    Diagnosis Date Noted    S/P total knee arthroplasty, right 03/10/2022    CVA (cerebral vascular accident) ([facility] ) 2022    Preoperative evaluation to rule out surgical contraindication 2022    Leukopenia 2021    Mixed hyperlipidemia 2021    Medical clearance for psychiatric admission 2021    History of CVA (cerebrovascular accident) 2021    Encephalopathy 2021    Nausea 2021    Multiple closed fractures of metatarsal bone of right foot 2021    Chronic back pain 2021    Arthritis of right knee 10/06/2020    Dysphagia 2020    Ambulatory dysfunction 2020    Status post insertion of spinal cord stimulator 2020    Superficial bacterial infection of skin 2020    Scar of back 2020    Impaired skin integrity associated with surgical incision 2020    Rash 2020    Primary osteoarthritis of both knees 2020    Patellofemoral disorder of both knees 2020    Tardive dyskinesia 2020    MIMI (obstructive sleep apnea)     Complaint related to dreams 2020    Family history of colorectal cancer 2019    Encounter for long-term use of opiate analgesic 2019    Post laminectomy syndrome 10/07/2019    Lumbar disc disease with radiculopathy 2018    Spinal stenosis of lumbar region with neurogenic claudication 2018    Lumbar radiculopathy 2017    Bilateral hip pain 2017    Primary localized osteoarthritis of both knees 2017    Chronic pain disorder 2017    Opioid dependence ([facility] ) 2017    Sacroiliitis ([facility] ) 2017    Lumbar spondylosis 10/31/2016    Osteoarthritis of both hips 10/31/2016    Generalized anxiety disorder 10/26/2016    Major depressive disorder, recurrent episode, moderate degree ([facility] [facility] ) 09/08/2016    Right knee pain 06/06/2016    Recent unexplained weight loss 06/06/2016    Fatigue 10/26/2015    Bipolar II disorder ([facility] ) 06/18/2015    Panic disorder without agoraphobia 06/18/2015    Post traumatic stress disorder 06/18/2015    Left hip pain 07/25/2014    Intractable low back pain 06/16/2014    Tremor 06/12/2014    Migraine 05/27/2014    Esophageal reflux 05/01/2014    Cognitive disorder 04/18/2014    Female pelvic pain 10/30/2013    Pain in joint, shoulder region 10/28/2013    Overactive bladder 09/26/2013    Osteoarthritis of knee 02/20/2013    Insomnia 01/31/2013    History of hypokalemia 01/03/2013    Urinary incontinence 09/24/2012    Vitamin D deficiency 09/18/2012    Fibromyalgia 09/14/2012    Essential hypertension 09/14/2012      LOS (days): 4  Geometric Mean LOS (GMLOS) (days): 1 80  Days to GMLOS:-2 1     OBJECTIVE:    Risk of Unplanned Readmission Score: 41      Current admission status: Inpatient     Preferred Pharmacy:   [address] [place] [address]  [address] [place] [address]  Phone: [phone] Fax: [phone]    [id number] [place] [address]  Phone: [phone] Fax: [phone]    Primary Care Provider: Yesenia Farias DO    Primary Insurance: [address] [facility] [facility]  Secondary Insurance: [address] [place] [address] Blvd:  [address] Proxies    There are no active Health Care Proxies on file         Patient Information  Admitted from[de-identified] Home  Mental Status: Alert  During Assessment patient was accompanied by: Not accompanied during assessment  Assessment information provided by[de-identified] Daughter  Primary Caregiver: Self  Support Systems: Self,Daughter,Family members  Type of Current Residence: 2 story home  Upon entering residence, is there a bedroom on the main floor (no further steps)?: No  A bedroom is located on the following floor levels of residence (select all that apply):: 2nd Floor  Upon entering residence, is there a bathroom on the main floor (no further steps)?: Yes  Number of steps to 2nd floor from main floor: 10  Living Arrangements: Lives w/ [address] w/ Extended Family    Activities of Daily Living Prior to Admission  Functional Status: Assistance  Completes ADLs independently?: Yes  Ambulates independently?: Yes  Does patient use assisted devices?: Yes  Assisted Devices (DME) used: [employer]  Does patient currently own DME?: Yes  What DME does the patient currently own?: [employer]  Does the patient have a history of Short-Term Rehab?: Yes    Means of Transportation  Means of Transport to Appts[de-identified] Family transport    DISCHARGE DETAILS:    Discharge planning discussed with[de-identified] DaughterAki of Choice: Yes  Comments - Freedom of Choice: CM was able to speak with pt's dtr to discuss discharge needs  Tunde's  is pt's paid caregiver through waiver services  He also runs a business from home so is home all the time as well as pt's dtr  Pt is never alone  She is working on getting pt a hospital bed to help with her pain  Dtr reports that her  is going to be adjusting the hours to be more around the clock; currently the hours are set in the morning  PTA pt was bathing, dressing, cooking meals, etc by herself  Dtr is interested in STR prior to returning home  Pt should qualify for exceptional admission; [facility] currently declining but CM will see if they would reconsider  CM will advise    CM contacted family/caregiver?: Yes  Were Treatment Team discharge recommendations reviewed with patient/caregiver?: Yes     Contacts  Patient Contacts: Alessandroripiedad  Relationship to Patient[de-identified] Family  Contact Method: Phone  Phone Number: [phone]  Reason/Outcome: Discharge Planning    Treatment Team Recommendation: Short Term Rehab  Discharge Destination Plan[de-identified] Short Term Rehab

## 2022-03-14 NOTE — CONSULTS
Psychiatry Consultation     Please see Consult note on 3/14/22 fulfilled by me and attested by Dr Maria Isabel Hayes MD    Psychiatry Resident, PGY-II

## 2022-03-14 NOTE — PHYSICAL THERAPY NOTE
Physical Therapy Treatment Note     03/14/22 1454   PT Last Visit   PT Visit Date 03/14/22   Note Type   Note Type BID visit/treatment   Pain Assessment   Pain Assessment Tool 0-10   Pain Score 4   Pain Location/Orientation Orientation: Right;Location: Knee   Restrictions/Precautions   Weight Bearing Precautions Per Order Yes   RLE Weight Bearing Per Order WBAT   Other Precautions Bed Alarm;WBS;Fall Risk;Pain   General   Chart Reviewed Yes   Subjective   Subjective pt  in bed sleeping upon entry  Agreeable to PT   Bed Mobility   Supine to Sit 5  Supervision   Additional items Assist x 1; Increased time required; Bedrails   Sit to Supine 4  Minimal assistance   Additional items Assist x 1;LE management;Verbal cues; Increased time required   Transfers   Sit to Stand 4  Minimal assistance   Additional items Assist x 2; Increased time required;Verbal cues;Armrests   Stand to Sit 4  Minimal assistance   Additional items Assist x 2; Increased time required;Verbal cues;Armrests   Ambulation/Elevation   Gait pattern Improper Weight shift; Antalgic; Forward Flexion;Decreased foot clearance;Decreased R stance; Excessively slow; Short stride; Foward flexed; Inconsistent zack;Decreased heel strike;Decreased toe off   Gait Assistance 4  Minimal assist   Additional items Assist x 2;Verbal cues; Other (Comment)  (chair follow)   Assistive Device Rolling walker   Distance 16ft, 20ft   Balance   Static Sitting Fair +   Dynamic Sitting Fair   Static Standing Fair -   Dynamic Standing Poor +   Ambulatory Poor +   Endurance Deficit   Endurance Deficit Yes   Endurance Deficit Description Pain   Activity Tolerance   Activity Tolerance Patient tolerated treatment well   Nurse Made Aware Yes   Assessment   Prognosis Fair   Problem List Decreased strength;Decreased range of motion;Decreased endurance; Impaired balance;Decreased mobility; Decreased coordination;Decreased cognition; Impaired judgement;Decreased safety awareness;Orthopedic restrictions;Pain   Assessment pt  noted with increased difficulty in speech this session compared to a m  session  pt  noted with increased diffculty with transfers and needed slightly more assist  Noted increased tremors this session  RN aware of the same  Pt  reported increased pain in knees with ambulation however as distance progressed noted improved steps and no knee buckling noted, Minimal LE instability noted initially which improved as ambulation progressed  Pt  was positioned back in bed post session with all needs within reach and alarm on  Will continue to follow during the stay to maximzie functional mobility  Barriers to Discharge Inaccessible home environment;Decreased caregiver support   Goals   Patient Goals None reported   STG Expiration Date 03/19/22   PT Treatment Day 11   Plan   Treatment/Interventions Functional transfer training;Patient/family training;Equipment eval/education; Bed mobility;Gait training;Spoke to nursing   Progress Progressing toward goals   PT Frequency Twice a day   Recommendation   PT Discharge Recommendation Post acute rehabilitation services   Equipment Recommended Other (Comment)  (TBD)   AM-PAC Basic Mobility Inpatient   Turning in Bed Without Bedrails 3   Lying on Back to Sitting on Edge of Flat Bed 3   Moving Bed to Chair 2   Standing Up From Chair 2   Walk in Room 2   Climb 3-5 Stairs 1   Basic Mobility Inpatient Raw Score 13   Basic Mobility Standardized Score 33 99   Highest Level Of Mobility   JH-HLM Goal 4: Move to chair/commode   JH-HLM Highest Level of Mobility 5: Stand (1 or more minutes)   JH-HLM Goal Achieved Yes   End of Consult   Patient Position at End of Consult Supine;Bed/Chair alarm activated; All needs within reach         Home Vasquez PTA    An AM-PAC basic mobility standardized score less than 42 9 suggest the patient may benefit from discharge to post-acute rehab services

## 2022-03-14 NOTE — PLAN OF CARE
Problem: PHYSICAL THERAPY ADULT  Goal: Performs mobility at highest level of function for planned discharge setting  See evaluation for individualized goals  Description: Treatment/Interventions: Functional transfer training,LE strengthening/ROM,Elevations,Therapeutic exercise,Cognitive reorientation,Patient/family training,Equipment eval/education,Bed mobility,Gait training,Compensatory technique education,Continued evaluation,Spoke to nursing          See flowsheet documentation for full assessment, interventions and recommendations  3/14/2022 1610 by Albaro Castanon PTA  Outcome: Progressing  Note: Prognosis: Fair  Problem List: Decreased strength,Decreased range of motion,Decreased endurance,Impaired balance,Decreased mobility,Decreased coordination,Decreased cognition,Impaired judgement,Decreased safety awareness,Orthopedic restrictions,Pain  Assessment: pt  noted with increased difficulty in speech this session compared to a m  session  pt  noted with increased diffculty with transfers and needed slightly more assist  Noted increased tremors this session  RN aware of the same  Pt  reported increased pain in knees with ambulation however as distance progressed noted improved steps and no knee buckling noted, Minimal LE instability noted initially which improved as ambulation progressed  Pt  was positioned back in bed post session with all needs within reach and alarm on  Will continue to follow during the stay to maximzie functional mobility  Barriers to Discharge: Inaccessible home environment,Decreased caregiver support  Barriers to Discharge Comments: zoe? level of supervision at home? PT Discharge Recommendation: Post acute rehabilitation services          See flowsheet documentation for full assessment       3/14/2022 1016 by Albaro Castanon PTA  Outcome: Progressing  Note: Prognosis: Fair  Problem List: Decreased strength,Decreased range of motion,Decreased endurance,Impaired balance,Decreased coordination,Decreased mobility,Decreased cognition,Impaired judgement,Decreased safety awareness,Pain,Orthopedic restrictions  Assessment: Pt  with decreased c/o pain  No knee buckling during standing or ambulation noted this session  Pt  able to increase ambualtion distance this session  Educated patient in the need of motivating herself to actively participate in therapy to improve her mobility and to achieve a positive outcome for the surgery  Assistance provided for LE ROM in supine position  Supine to sit transfer with use of bedrail and HOB flat and S  Transfer to [facility] with Mod A x 1 with VCs for hand placement and techqniue  Cues for hand placement for all transfers  Pt  noted with improved R knee ROM  pt  able to achieve R knee flexion to 90 deg in sitting  pt  provided with VCs for LE advancing and AD sequencing during ambulation  Improved steps noted for ambulation  Min A x 2 and 3rd A for chair follow safety during ambulation  Pt  positioned on recliner post session with chair alarm engaged and all needs within reach  Noted with improved speech this session  Will continue to follow during the stay to [facility] functional mobility  Barriers to Discharge: Inaccessible home environment,Decreased caregiver support  Barriers to Discharge Comments: zoe? level of supervision at home? PT Discharge Recommendation: Post acute rehabilitation services          See flowsheet documentation for full assessment

## 2022-03-14 NOTE — PLAN OF CARE
Problem: MOBILITY - ADULT  Goal: Maintain or return to baseline ADL function  Description: INTERVENTIONS:  -  Assess patient's ability to carry out ADLs; assess patient's baseline for ADL function and identify physical deficits which impact ability to perform ADLs (bathing, care of mouth/teeth, toileting, grooming, dressing, etc )  - Assess/evaluate cause of self-care deficits   - Assess range of motion  - Assess patient's mobility; develop plan if impaired  - Assess patient's need for assistive devices and provide as appropriate  - Encourage maximum independence but intervene and supervise when necessary  - Involve family in performance of ADLs  - Assess for home care needs following discharge   - Consider OT consult to assist with ADL evaluation and planning for discharge  - Provide patient education as appropriate  Outcome: Progressing  Goal: Maintains/Returns to pre admission functional level  Description: INTERVENTIONS:  - Perform BMAT or MOVE assessment daily    - Set and communicate daily mobility goal to care team and patient/family/caregiver  - Collaborate with rehabilitation services on mobility goals if consulted  - Perform Range of Motion 3 times a day  - Reposition patient every 2 hours    - Dangle patient 3 times a day  - Stand patient 3 times a day  - Ambulate patient 3 times a day  - Out of bed to chair 3 times a day   - Out of bed for meals 3 times a day  - Out of bed for toileting  - Record patient progress and toleration of activity level   Outcome: Progressing     Problem: Potential for Falls  Goal: Patient will remain free of falls  Description: INTERVENTIONS:  - Educate patient/family on patient safety including physical limitations  - Instruct patient to call for assistance with activity   - Consult OT/PT to assist with strengthening/mobility   - Keep Call bell within reach  - Keep bed low and locked with side rails adjusted as appropriate  - Keep care items and personal belongings within reach  - Initiate and maintain comfort rounds  - Make Fall Risk Sign visible to staff  - Offer Toileting every 2 Hours, in advance of need  - Initiate/Maintain bed alarm  - Obtain necessary fall risk management equipment: bed alarm  - Apply yellow socks and bracelet for high fall risk patients  - Consider moving patient to room near nurses station  Outcome: Progressing     Problem: PAIN - ADULT  Goal: Verbalizes/displays adequate comfort level or baseline comfort level  Description: Interventions:  - Encourage patient to monitor pain and request assistance  - Assess pain using appropriate pain scale  - Administer analgesics based on type and severity of pain and evaluate response  - Implement non-pharmacological measures as appropriate and evaluate response  - Consider cultural and social influences on pain and pain management  - Notify physician/advanced practitioner if interventions unsuccessful or patient reports new pain  Outcome: Progressing     Problem: DISCHARGE PLANNING  Goal: Discharge to home or other facility with appropriate resources  Description: INTERVENTIONS:  - Identify barriers to discharge w/patient and caregiver  - Arrange for needed discharge resources and transportation as appropriate  - Identify discharge learning needs (meds, wound care, etc )  - Arrange for interpretive services to assist at discharge as needed  - Refer to Case Management Department for coordinating discharge planning if the patient needs post-hospital services based on physician/advanced practitioner order or complex needs related to functional status, cognitive ability, or social support system  Outcome: Progressing     Problem: Prexisting or High Potential for Compromised Skin Integrity  Goal: Skin integrity is maintained or improved  Description: INTERVENTIONS:  - Identify patients at risk for skin breakdown  - Assess and monitor skin integrity  - Assess and monitor nutrition and hydration status  - Monitor labs   - Assess for incontinence   - Turn and reposition patient  - Assist with mobility/ambulation  - Relieve pressure over bony prominences  - Avoid friction and shearing  - Provide appropriate hygiene as needed including keeping skin clean and dry  - Evaluate need for skin moisturizer/barrier cream  - Collaborate with interdisciplinary team   - Patient/family teaching  - Consider wound care consult   Outcome: Progressing     Problem: NEUROSENSORY - ADULT  Goal: Achieves stable or improved neurological status  Description: INTERVENTIONS  - Monitor and report changes in neurological status  - Monitor vital signs such as temperature, blood pressure, glucose, and any other labs ordered   - Initiate measures to prevent increased intracranial pressure  - Monitor for seizure activity and implement precautions if appropriate      Outcome: Progressing  Goal: Achieves maximal functionality and self care  Description: INTERVENTIONS  - Monitor swallowing and airway patency with patient fatigue and changes in neurological status  - Encourage and assist patient to increase activity and self care     - Encourage visually impaired, hearing impaired and aphasic patients to use assistive/communication devices  Outcome: Progressing     Problem: SKIN/TISSUE INTEGRITY - ADULT  Goal: Incision(s), wounds(s) or drain site(s) healing without S/S of infection  Description: INTERVENTIONS  - Assess and document dressing, incision, wound bed, drain sites and surrounding tissue  - Provide patient and family education  - Perform skin care/dressing changes every shift  Outcome: Progressing     Problem: MUSCULOSKELETAL - ADULT  Goal: Maintain or return mobility to safest level of function  Description: INTERVENTIONS:  - Assess patient's ability to carry out ADLs; assess patient's baseline for ADL function and identify physical deficits which impact ability to perform ADLs (bathing, care of mouth/teeth, toileting, grooming, dressing, etc )  - Assess/evaluate cause of self-care deficits   - Assess range of motion  - Assess patient's mobility  - Assess patient's need for assistive devices and provide as appropriate  - Encourage maximum independence but intervene and supervise when necessary  - Involve family in performance of ADLs  - Assess for home care needs following discharge   - Consider OT consult to assist with ADL evaluation and planning for discharge  - Provide patient education as appropriate  Outcome: Progressing  Goal: Maintain proper alignment of affected body part  Description: INTERVENTIONS:  - Support, maintain and protect limb and body alignment  - Provide patient/ family with appropriate education  Outcome: Progressing

## 2022-03-14 NOTE — PLAN OF CARE
Problem: PHYSICAL THERAPY ADULT  Goal: Performs mobility at highest level of function for planned discharge setting  See evaluation for individualized goals  Description: Treatment/Interventions: Functional transfer training,LE strengthening/ROM,Elevations,Therapeutic exercise,Cognitive reorientation,Patient/family training,Equipment eval/education,Bed mobility,Gait training,Compensatory technique education,Continued evaluation,Spoke to nursing          See flowsheet documentation for full assessment, interventions and recommendations  Outcome: Progressing  Note: Prognosis: Fair  Problem List: Decreased strength,Decreased range of motion,Decreased endurance,Impaired balance,Decreased coordination,Decreased mobility,Decreased cognition,Impaired judgement,Decreased safety awareness,Pain,Orthopedic restrictions  Assessment: Pt  with decreased c/o pain  No knee buckling during standing or ambulation noted this session  Pt  able to increase ambualtion distance this session  Educated patient in the need of motivating herself to actively participate in therapy to improve her mobility and to achieve a positive outcome for the surgery  Assistance provided for LE ROM in supine position  Supine to sit transfer with use of bedrail and HOB flat and S  Transfer to [facility] with Mod A x 1 with VCs for hand placement and techqniue  Cues for hand placement for all transfers  Pt  noted with improved R knee ROM  pt  able to achieve R knee flexion to 90 deg in sitting  pt  provided with VCs for LE advancing and AD sequencing during ambulation  Improved steps noted for ambulation  Min A x 2 and 3rd A for chair follow safety during ambulation  Pt  positioned on recliner post session with chair alarm engaged and all needs within reach  Noted with improved speech this session  Will continue to follow during the stay to [facility] functional mobility    Barriers to Discharge: Inaccessible home environment,Decreased caregiver support  Barriers to Discharge Comments: zoe? level of supervision at home? PT Discharge Recommendation: Post acute rehabilitation services          See flowsheet documentation for full assessment

## 2022-03-14 NOTE — PROGRESS NOTES
[address]  Progress Note - Fonnie Forward 1965, 60 y o  female MRN: [id number]  Unit/Bed#: E2 -02 Encounter: [id number]  Primary Care Provider: Austin Olivas DO   Date and time admitted to hospital: 3/9/2022  9:14 AM    History of CVA (cerebrovascular accident)  Assessment & Plan  Continue aspirin and statin    Tardive dyskinesia  Assessment & Plan  Continue Lyrica  Valbenazine not on formulary, Family was unable to find it at home  [facility] who [facility] that [facility]  [facility]  Should arrive tomorrow  Discussed with psychiatric pharmacist, recommending clonazepam 0 5 mg b i d  P r n      Essential hypertension  Assessment & Plan  Continue inderal and prazosin  BP currently controlled    Generalized anxiety disorder  Assessment & Plan  Continue Paxil and BuSpar    Esophageal reflux  Assessment & Plan  Continue PPI    Bipolar II disorder (HCC)  Assessment & Plan  Follows with Psychiatry outpatient  Continue lithium, and Seroquel bedtime    Lumbar radiculopathy  Assessment & Plan  · s/p thoracolumbar decompression and fusion in 2018 in [place] with subsequent laminectomy syndrome   · S/p thoracic spinal cord stimulator placement in 7/28/20  · Recommend continue follow-up with Neurosurgery outpatient for further evaluation as patient does not use spinal stimulator at this time      Chronic pain disorder  Assessment & Plan  History of opioid dependence, reporting bilateral knee pain  Caution regarding significant opioids for pain control  Recommend topical agents, Voltaren, diclofenac, Lidoderm patches  Minimize oxycodone as needed to work with physical therapy    * S/P total knee arthroplasty, right  Assessment & Plan  Status post 3/9 right total knee arthroplasty  PT and OT currently recommend rehab  H/H stable  DVTppx with lovenox, will need 30 days  Follow up with ortho in 2 weeks  Psych consulted for evaluation, will need option for placement        VTE Pharmacologic Prophylaxis: lovenox    Mechanical VTE Prophylaxis in Place: Yes    Education and Discussions with Family / Patient: called her daughter and updated her    Time Spent for Care: 45 minutes  More than 50% of total time spent on counseling and coordination of care as described above  Calling [facility]  Verifying all medications  Getting medication shipped  Speaking and evaluating pt  Calling her daughter and updating her  Current Length of Stay: 4 day(s)  Current Patient Status: Inpatient     Discharge Plan / Estimated Discharge Date: awaiting str  Code Status: Level 1 - Full Code      Subjective:   Pt seen and examined  Pt states she is having a difficulty time talking  She states she does not have the valbenazine at home  She is worried about how much it will cost  She did give me the okay to call the pharmacy to see if can get it refilled if it does not cost her a lot of money  She does have pain in both knees at times  No other problems were reported  Objective:     Vitals:   Temp (24hrs), Av 5 °F (36 4 °C), Min:97 3 °F (36 3 °C), Max:97 8 °F (36 6 °C)    Temp:  [97 3 °F (36 3 °C)-97 8 °F (36 6 °C)] 97 8 °F (36 6 °C)  HR:  [81-90] 88  Resp:  [16-18] 16  BP: (102-129)/(66-76) 112/66  SpO2:  [98 %-100 %] 98 %  Body mass index is 34 96 kg/m²  Input and Output Summary (last 24 hours): Intake/Output Summary (Last 24 hours) at 3/14/2022 1021  Last data filed at 3/14/2022 1009  Gross per 24 hour   Intake --   Output 2000 ml   Net -2000 ml       Physical Exam:   Physical Exam  Constitutional:       Appearance: Normal appearance  HENT:      Head: Normocephalic and atraumatic  Eyes:      Extraocular Movements: Extraocular movements intact  Pupils: Pupils are equal, round, and reactive to light  Cardiovascular:      Rate and Rhythm: Normal rate and regular rhythm  Heart sounds: No murmur heard  No friction rub   No gallop  Pulmonary:      Effort: Pulmonary effort is normal  No respiratory distress  Breath sounds: Normal breath sounds  No wheezing or rales  Abdominal:      General: Bowel sounds are normal  There is no distension  Palpations: Abdomen is soft  Tenderness: There is no abdominal tenderness  There is no guarding  Musculoskeletal:      Right lower leg: No edema  Left lower leg: No edema  Neurological:      Mental Status: She is alert and oriented to person, place, and time        Comments: Repetitive movements of mouth and tongue          Additional Data:     Labs:  Results from last 7 days   Lab Units 03/12/22  0456   WBC Thousand/uL 7 63   HEMOGLOBIN g/dL 9 9*   HEMATOCRIT % 30 2*   PLATELETS Thousands/uL 269   NEUTROS PCT % 61   LYMPHS PCT % 22   MONOS PCT % 12   EOS PCT % 5     Results from last 7 days   Lab Units 03/12/22  0456   SODIUM mmol/L 137   POTASSIUM mmol/L 4 0   CHLORIDE mmol/L 105   CO2 mmol/L 25   BUN mg/dL 13   CREATININE mg/dL 0 89   ANION GAP mmol/L 7   CALCIUM mg/dL 8 0*   GLUCOSE RANDOM mg/dL 102                       Recent Cultures (last 7 days):           Lines/Drains:  Invasive Devices  Report    Peripheral Intravenous Line            Peripheral IV 03/10/22 Distal;Left;Upper;Ventral (anterior) Arm 3 days              Last 24 Hours Medication List:   Current Facility-Administered Medications   Medication Dose Route Frequency Provider Last Rate    acetaminophen  975 mg Oral [facility] Adrián Robles PA-C      ascorbic acid  500 mg Oral Daily Adrián Robles PA-C      aspirin  81 mg Oral Daily Abby Garsia      atorvastatin  40 mg Oral Daily With BIO-IVT GroupJACQUI      busPIRone  20 mg Oral TID Pal Figueroa PA-C      calcium carbonate  1,000 mg Oral Daily PRN Adrián Singh PA-C      clonazePAM  0 5 mg Oral BID PRN Cinthia Holloway MD      enoxaparin  40 mg Subcutaneous Daily Adrián Robles PA-C      folic acid  1 mg Oral Daily Adrián Payne Francisco, PA-C      HYDROmorphone  0 5 mg Intravenous Q4H PRN Pollie Sic Francisco, PA-C      hydrOXYzine HCL  50 mg Oral TID PRN Juan Diego Necessary, PA-C      lithium carbonate  300 mg Oral HS Pollie Sic Robles, PA-C      mirtazapine  7 5 mg Oral HS PRN Juan Diego Necessary, PA-C      oxyCODONE  10 mg Oral Q4H PRN Pollie Sic Robles, PA-C      oxyCODONE  5 mg Oral Q4H PRN Pollie Sic Fajardo, PA-C      pantoprazole  20 mg Oral Early Morning [place], [place]      PARoxetine  60 mg Oral Daily [place], [place]      polyethylene glycol  17 g Oral Daily Jeannie Chapman MD      potassium chloride  20 mEq Oral Daily Pollie [facility], PA-C      prazosin  2 mg Oral HS Pollie Sic Fajardo, PA-C      pregabalin  150 mg Oral TID Juan Diego Necessary, PA-C      promethazine  25 mg Intramuscular Q6H PRN Pollie Sic [facility], PA-C      propranolol  20 mg Oral BID before breakfast/lunch Juan Diego Necessary, PA-C      QUEtiapine  50 mg Oral HS Juan Diego Necessary, JACQUI      senna  2 tablet Oral HS Jeannie Chapman MD      Valbenazine Tosylate  80 mg Oral Daily Juan Diego Necessary, [place]          Today, Patient Was Seen By: Ruiz Bernal DO
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
## 2022-03-14 NOTE — PHYSICAL THERAPY NOTE
Physical Therapy Treatment Note     03/14/22 0914   PT Last Visit   PT Visit Date 03/14/22   Note Type   Note Type BID visit/treatment   Pain Assessment   Pain Assessment Tool 0-10   Pain Score 7   Pain Location/Orientation Location: Knee;Orientation: Right   Restrictions/Precautions   Weight Bearing Precautions Per Order Yes   RLE Weight Bearing Per Order WBAT   Other Precautions Chair Alarm; Bed Alarm;Cognitive;WBS;Pain; Fall Risk  (speech impairments)   General   Chart Reviewed Yes   Subjective   Subjective pt  in bed upon entry  Agreeable to PT  Bed Mobility   Supine to Sit 5  Supervision   Additional items Assist x 1;Bedrails; Increased time required   Transfers   Sit to Stand 3  Moderate assistance   Additional items Assist x 2; Increased time required;Verbal cues;Armrests   Stand to Sit 3  Moderate assistance   Additional items Assist x 2;Verbal cues; Impulsive   Stand pivot 3  Moderate assistance   Additional items Assist x 1; Increased time required;Verbal cues;Armrests   Toilet transfer 3  Moderate assistance   Additional items Assist x 1; Increased time required;Commode;Verbal cues;Armrests   Ambulation/Elevation   Gait pattern Antalgic; Improper Weight shift; Inconsistent zack; Short stride; Excessively slow;Decreased heel strike;Decreased toe off;Decreased foot clearance   Gait Assistance 3  Moderate assist   Additional items Assist x 2;Assist x 3;Verbal cues  (3rd A for chair follow only)   Assistive Device Rolling walker   Distance 20ft x 2   Balance   Static Sitting Fair   Dynamic Sitting Fair -   Static Standing Fair -   Dynamic Standing Poor -   Ambulatory Zero   Endurance Deficit   Endurance Deficit Yes   Endurance Deficit Description pain   Activity Tolerance   Activity Tolerance Patient tolerated treatment well   Nurse Made Aware Yes   Medical Staff Made Aware Rn [address]   Exercises   TKR Supine;AAROM;20 reps;Bilateral  (SLR, HS, quad sets, AP, hip abd)   Assessment   Prognosis Fair   Problem List Decreased strength;Decreased range of motion;Decreased endurance; Impaired balance;Decreased coordination;Decreased mobility; Decreased cognition; Impaired judgement;Decreased safety awareness;Pain;Orthopedic restrictions   Assessment Pt  with decreased c/o pain  No knee buckling during standing or ambulation noted this session  Pt  able to increase ambualtion distance this session  Educated patient in the need of motivating herself to actively participate in therapy to improve her mobility and to achieve a positive outcome for the surgery  Assistance provided for LE ROM in supine position  Supine to sit transfer with use of bedrail and HOB flat and S  Transfer to [facility] with Mod A x 1 with VCs for hand placement and techqniue  Cues for hand placement for all transfers  Pt  noted with improved R knee ROM  pt  able to achieve R knee flexion to 90 deg in sitting  pt  provided with VCs for LE advancing and AD sequencing during ambulation  Improved steps noted for ambulation  Min A x 2 and 3rd A for chair follow safety during ambulation  Pt  positioned on recliner post session with chair alarm engaged and all needs within reach  Noted with improved speech this session  Will continue to follow during the stay to [facility] functional mobility  Barriers to Discharge Inaccessible home environment;Decreased caregiver support   Goals   Patient Goals None reported   STG Expiration Date 03/19/22   PT Treatment Day 10   Plan   Treatment/Interventions Functional transfer training;LE strengthening/ROM; Therapeutic exercise;Patient/family training;Equipment eval/education;Gait training;Bed mobility;Spoke to nursing   Progress Slow progress, decreased activity tolerance   PT Frequency Twice a day   Recommendation   PT Discharge Recommendation Post acute rehabilitation services   Equipment Recommended Other (Comment)  (TBD)   AM-PAC Basic Mobility Inpatient   Turning in Bed Without Bedrails 3   Lying on Back to Sitting on Edge of Flat Bed 3   Moving Bed to Chair 2   Standing Up From Chair 2   Walk in Room 2   Climb 3-5 Stairs 1   Basic Mobility Inpatient Raw Score 13   Basic Mobility Standardized Score 33 99   Highest Level Of Mobility   -[facility] Goal 4: Move to chair/commode   -M Highest Level of Mobility 5: Stand (1 or more minutes)   -[facility] Goal Achieved Yes   End of Consult   Patient Position at End of Consult Bedside chair;Bed/Chair alarm activated; All needs within reach   End of Consult Comments ice provided to R knee post session         Milka Mary PTA    An AM-PAC basic mobility standardized score less than 42 9 suggest the patient may benefit from discharge to post-acute rehab services

## 2022-03-15 PROBLEM — K59.00 CONSTIPATION: Status: ACTIVE | Noted: 2022-03-15

## 2022-03-15 LAB
ANION GAP SERPL CALCULATED.3IONS-SCNC: 5 MMOL/L (ref 4–13)
ATRIAL RATE: 80 BPM
BUN SERPL-MCNC: 6 MG/DL (ref 5–25)
CALCIUM SERPL-MCNC: 8.1 MG/DL (ref 8.3–10.1)
CHLORIDE SERPL-SCNC: 105 MMOL/L (ref 100–108)
CO2 SERPL-SCNC: 32 MMOL/L (ref 21–32)
CREAT SERPL-MCNC: 0.72 MG/DL (ref 0.6–1.3)
ERYTHROCYTE [DISTWIDTH] IN BLOOD BY AUTOMATED COUNT: 15 % (ref 11.6–15.1)
GFR SERPL CREATININE-BSD FRML MDRD: 92 ML/MIN/1.73SQ M
GLUCOSE SERPL-MCNC: 94 MG/DL (ref 65–140)
HCT VFR BLD AUTO: 29.1 % (ref 34.8–46.1)
HGB BLD-MCNC: 9.4 G/DL (ref 11.5–15.4)
MCH RBC QN AUTO: 29.1 PG (ref 26.8–34.3)
MCHC RBC AUTO-ENTMCNC: 32.3 G/DL (ref 31.4–37.4)
MCV RBC AUTO: 90 FL (ref 82–98)
P AXIS: 53 DEGREES
PLATELET # BLD AUTO: 267 THOUSANDS/UL (ref 149–390)
PMV BLD AUTO: 9.5 FL (ref 8.9–12.7)
POTASSIUM SERPL-SCNC: 3.9 MMOL/L (ref 3.5–5.3)
PR INTERVAL: 138 MS
QRS AXIS: 61 DEGREES
QRSD INTERVAL: 86 MS
QT INTERVAL: 406 MS
QTC INTERVAL: 468 MS
RBC # BLD AUTO: 3.23 MILLION/UL (ref 3.81–5.12)
SODIUM SERPL-SCNC: 142 MMOL/L (ref 136–145)
T WAVE AXIS: 41 DEGREES
VENTRICULAR RATE: 80 BPM
WBC # BLD AUTO: 5.32 THOUSAND/UL (ref 4.31–10.16)

## 2022-03-15 PROCEDURE — 97530 THERAPEUTIC ACTIVITIES: CPT

## 2022-03-15 PROCEDURE — 93010 ELECTROCARDIOGRAM REPORT: CPT | Performed by: INTERNAL MEDICINE

## 2022-03-15 PROCEDURE — 97535 SELF CARE MNGMENT TRAINING: CPT

## 2022-03-15 PROCEDURE — 99024 POSTOP FOLLOW-UP VISIT: CPT | Performed by: ORTHOPAEDIC SURGERY

## 2022-03-15 PROCEDURE — 93005 ELECTROCARDIOGRAM TRACING: CPT

## 2022-03-15 PROCEDURE — 97116 GAIT TRAINING THERAPY: CPT

## 2022-03-15 PROCEDURE — 80048 BASIC METABOLIC PNL TOTAL CA: CPT | Performed by: INTERNAL MEDICINE

## 2022-03-15 PROCEDURE — 99233 SBSQ HOSP IP/OBS HIGH 50: CPT | Performed by: INTERNAL MEDICINE

## 2022-03-15 PROCEDURE — 97110 THERAPEUTIC EXERCISES: CPT

## 2022-03-15 PROCEDURE — 85027 COMPLETE CBC AUTOMATED: CPT | Performed by: INTERNAL MEDICINE

## 2022-03-15 RX ADMIN — ASPIRIN 81 MG CHEWABLE TABLET 81 MG: 81 TABLET CHEWABLE at 08:07

## 2022-03-15 RX ADMIN — PRAZOSIN HYDROCHLORIDE 2 MG: 2 CAPSULE ORAL at 22:02

## 2022-03-15 RX ADMIN — PANTOPRAZOLE SODIUM 20 MG: 20 TABLET, DELAYED RELEASE ORAL at 05:39

## 2022-03-15 RX ADMIN — ACETAMINOPHEN 325MG 975 MG: 325 TABLET ORAL at 22:01

## 2022-03-15 RX ADMIN — CLONAZEPAM 0.5 MG: 0.5 TABLET ORAL at 05:39

## 2022-03-15 RX ADMIN — ENOXAPARIN SODIUM 40 MG: 40 INJECTION SUBCUTANEOUS at 01:28

## 2022-03-15 RX ADMIN — PROPRANOLOL HYDROCHLORIDE 10 MG: 20 TABLET ORAL at 12:36

## 2022-03-15 RX ADMIN — PREGABALIN 150 MG: 75 CAPSULE ORAL at 22:02

## 2022-03-15 RX ADMIN — ACETAMINOPHEN 325MG 975 MG: 325 TABLET ORAL at 13:32

## 2022-03-15 RX ADMIN — HYDROXYZINE HYDROCHLORIDE 50 MG: 25 TABLET ORAL at 08:27

## 2022-03-15 RX ADMIN — QUETIAPINE FUMARATE 50 MG: 25 TABLET ORAL at 22:02

## 2022-03-15 RX ADMIN — OXYCODONE HYDROCHLORIDE AND ACETAMINOPHEN 500 MG: 500 TABLET ORAL at 08:07

## 2022-03-15 RX ADMIN — SENNOSIDES 17.2 MG: 8.6 TABLET ORAL at 22:01

## 2022-03-15 RX ADMIN — ATORVASTATIN CALCIUM 40 MG: 40 TABLET, FILM COATED ORAL at 16:30

## 2022-03-15 RX ADMIN — OXYCODONE HYDROCHLORIDE 10 MG: 10 TABLET ORAL at 22:02

## 2022-03-15 RX ADMIN — PROPRANOLOL HYDROCHLORIDE 10 MG: 20 TABLET ORAL at 06:20

## 2022-03-15 RX ADMIN — OXYCODONE HYDROCHLORIDE 10 MG: 10 TABLET ORAL at 04:55

## 2022-03-15 RX ADMIN — BUSPIRONE HYDROCHLORIDE 20 MG: 10 TABLET ORAL at 22:02

## 2022-03-15 RX ADMIN — OXYCODONE HYDROCHLORIDE 10 MG: 10 TABLET ORAL at 12:36

## 2022-03-15 RX ADMIN — FOLIC ACID 1 MG: 1 TABLET ORAL at 08:07

## 2022-03-15 RX ADMIN — POTASSIUM CHLORIDE 20 MEQ: 1500 TABLET, EXTENDED RELEASE ORAL at 08:07

## 2022-03-15 RX ADMIN — BUSPIRONE HYDROCHLORIDE 20 MG: 10 TABLET ORAL at 16:30

## 2022-03-15 RX ADMIN — PAROXETINE 60 MG: 20 TABLET, FILM COATED ORAL at 08:07

## 2022-03-15 RX ADMIN — LITHIUM CARBONATE 300 MG: 300 TABLET, FILM COATED, EXTENDED RELEASE ORAL at 22:01

## 2022-03-15 RX ADMIN — HYDROMORPHONE HYDROCHLORIDE 0.5 MG: 1 INJECTION, SOLUTION INTRAMUSCULAR; INTRAVENOUS; SUBCUTANEOUS at 05:57

## 2022-03-15 RX ADMIN — POLYETHYLENE GLYCOL 3350 17 G: 17 POWDER, FOR SOLUTION ORAL at 08:07

## 2022-03-15 RX ADMIN — BUSPIRONE HYDROCHLORIDE 20 MG: 10 TABLET ORAL at 08:07

## 2022-03-15 RX ADMIN — PREGABALIN 150 MG: 75 CAPSULE ORAL at 08:07

## 2022-03-15 RX ADMIN — PREGABALIN 150 MG: 75 CAPSULE ORAL at 16:30

## 2022-03-15 RX ADMIN — ACETAMINOPHEN 325MG 975 MG: 325 TABLET ORAL at 05:39

## 2022-03-15 NOTE — PHYSICAL THERAPY NOTE
Physical Therapy Treatment Note     03/15/22 0843   PT Last Visit   PT Visit Date 03/15/22   Note Type   Note Type BID visit/treatment   Pain Assessment   Pain Assessment Tool 0-10   Pain Score 4   Pain Location/Orientation Orientation: Right;Location: Knee   Restrictions/Precautions   Weight Bearing Precautions Per Order Yes   RLE Weight Bearing Per Order WBAT   Other Precautions Chair Alarm; Bed Alarm;Cognitive;WBS;Fall Risk;Pain  (speech impairment)   General   Chart Reviewed Yes   Subjective   Subjective Pt  in bed sleeping upon entry  Agreeable to PT  Bed Mobility   Supine to Sit 5  Supervision   Additional items Assist x 1;Bedrails; Increased time required;Verbal cues   Transfers   Sit to Stand 4  Minimal assistance   Additional items Assist x 1; Increased time required;Verbal cues;Armrests   Stand to Sit 4  Minimal assistance   Additional items Assist x 1; Increased time required;Verbal cues;Armrests   Stand pivot 4  Minimal assistance   Additional items Assist x 1; Increased time required;Verbal cues;Armrests  (with RW)   Toilet transfer 4  Minimal assistance   Additional items Assist x 1; Increased time required;Commode;Armrests  (with RW)   Ambulation/Elevation   Gait pattern Improper Weight shift; Antalgic;Decreased foot clearance; Forward Flexion;Narrow CALLY; Inconsistent zack; Foward flexed; Short stride; Ataxia; Excessively slow;Decreased heel strike;Decreased toe off;Decreased L stance  (LE instability initially)   Gait Assistance 4  Minimal assist   Additional items Assist x 1;Assist x 2;Verbal cues  (2dn A for chair follow)   Assistive Device Rolling walker   Distance 28ft x 2   Balance   Static Sitting Good   Dynamic Sitting Fair +   Static Standing Fair   Dynamic Standing Poor +   Ambulatory Poor +   Endurance Deficit   Endurance Deficit Yes   Endurance Deficit Description pain   Activity Tolerance   Activity Tolerance Patient tolerated treatment well   Nurse Made Aware Yes   Exercises   TKR Supine;Bilateral;AAROM;20 reps  (SLR, HS, AP, quad sets)   Assessment   Prognosis Good   Problem List Decreased strength;Decreased range of motion;Decreased endurance; Impaired balance;Decreased mobility; Decreased coordination; Impaired judgement;Decreased safety awareness;Pain;Orthopedic restrictions   Assessment pt  needed A for BLEs for supine Reyna  Pt  noted with facial grimacing due to increased discomfort with mobility, reported increased pain towards the end of fist ambulation  Needed encouragement to complete the 28ft distance  However patient reported decreased pain post second ambulation  Noted LE instability initially with first ambulation  noted improved gait with steadier steps and gait speed as distance progressed in the first amb  trial and with second ambulation  needed overall decreased assist with ambulation this session  Pt  reported she does not have the spinal stimulator on as it gives her more pain  Pt  noted with decreased tremors and was able to stand with 1UE support   Pt  needed cues for hand placement for STS transfers  pt  able to do pericare in sitting with S  pt  with more alertness once seated at EOB  Needed encouragement to stay awake while performed LE Reyna in supine  pt  seated on recliner post session with alarm engaged and all needs within reach  Will continue to follow during the stay to maximize functional mobility  Barriers to Discharge Inaccessible home environment;Decreased caregiver support   Goals   Patient Goals None reported   STG Expiration Date 03/19/22   PT Treatment Day 12   Plan   Treatment/Interventions LE strengthening/ROM; Functional transfer training; Therapeutic exercise;Patient/family training;Equipment eval/education;Gait training;Bed mobility;Spoke to nursing   Progress Progressing toward goals   PT Frequency Twice a day   Recommendation   PT Discharge Recommendation Post acute rehabilitation services   AM-PAC Basic Mobility Inpatient   Turning in Bed Without Bedrails 3   Lying on Back to Sitting on Edge of Flat Bed 3   Moving Bed to Chair 3   Standing Up From Chair 3   Walk in Room 3   Climb 3-5 Stairs 2   Basic Mobility Inpatient Raw Score 17   Basic Mobility Standardized Score 39 67   Highest Level Of Mobility   JH-HLM Goal 5: Stand one or more mins   JH-HLM Highest Level of Mobility 6: Walk 10 steps or more   JH-HLM Goal Achieved Yes   End of Consult   Patient Position at End of Consult Bedside chair;Bed/Chair alarm activated; All needs within reach         Aleta Sheehan PTA    An AM-PAC basic mobility standardized score less than 42 9 suggest the patient may benefit from discharge to post-acute rehab services

## 2022-03-15 NOTE — PLAN OF CARE
Problem: PHYSICAL THERAPY ADULT  Goal: Performs mobility at highest level of function for planned discharge setting  See evaluation for individualized goals  Description: Treatment/Interventions: Functional transfer training,LE strengthening/ROM,Elevations,Therapeutic exercise,Cognitive reorientation,Patient/family training,Equipment eval/education,Bed mobility,Gait training,Compensatory technique education,Continued evaluation,Spoke to nursing          See flowsheet documentation for full assessment, interventions and recommendations  3/15/2022 1023 by Mundo Garcia PTA  Outcome: Progressing  Note: Prognosis: Good  Problem List: Decreased strength,Decreased range of motion,Decreased endurance,Impaired balance,Decreased mobility,Decreased coordination,Impaired judgement,Decreased safety awareness,Pain,Orthopedic restrictions  Assessment: pt  needed A for BLEs for supine Reyna  Pt  noted with facial grimacing due to increased discomfort with mobility, reported increased pain towards the end of fist ambulation  Needed encouragement to complete the 28ft distance  However patient reported decreased pain post second ambulation  Noted LE instability initially with first ambulation  noted improved gait with steadier steps and gait speed as distance progressed in the first amb  trial and with second ambulation  needed overall decreased assist with ambulation this session  Pt  reported she does not have the spinal stimulator on as it gives her more pain  Pt  noted with decreased tremors and was able to stand with 1UE support   Pt  needed cues for hand placement for STS transfers  pt  able to do pericare in sitting with S  pt  with more alertness once seated at EOB  Needed encouragement to stay awake while performed LE Reyna in supine  pt  seated on recliner post session with alarm engaged and all needs within reach  Will continue to follow during the stay to maximize functional mobility     Barriers to Discharge: Inaccessible home environment,Decreased caregiver support  Barriers to Discharge Comments: zoe? level of supervision at home? PT Discharge Recommendation: Post acute rehabilitation services          See flowsheet documentation for full assessment  3/15/2022 1013 by Naz Pompa PTA  Outcome: Progressing  Note: Prognosis: Good  Problem List: Decreased strength,Decreased range of motion,Decreased endurance,Impaired balance,Decreased mobility,Decreased coordination,Impaired judgement,Decreased safety awareness,Pain,Orthopedic restrictions  Assessment: pt  needed A for BLEs for supine Reyna  Pt  noted with facial grimacing due to increased discomfort with mobility, reported increased pain towards the end of fist ambulation  Needed encouragement to complete the 28ft distance  However patient reported decreased pain post second ambulation  Noted LE instability initially with first ambulation  noted improved gait with steadier steps and gait speed as distance progressed in the first amb  trial and with second ambulation  needed overall decreased assist with ambulation this session  Pt  reported she does not have the spinal stimulator on as it gives her more pain  Pt  noted with decreased tremors and was able to stand with 1UE support   Pt  needed cues for hand placement for STS transfers  pt  able to do pericare in sitting with S  pt  with more alertness once seated at EOB  Needed encouragement to stay awake while performed LE Reyna in supine  pt  seated on recliner post session with alarm engaged and all needs within reach  Will continue to follow during the stay to maximize functional mobility  Barriers to Discharge: Inaccessible home environment,Decreased caregiver support  Barriers to Discharge Comments: zoe? level of supervision at home? PT Discharge Recommendation: Post acute rehabilitation services          See flowsheet documentation for full assessment

## 2022-03-15 NOTE — PLAN OF CARE
Problem: PHYSICAL THERAPY ADULT  Goal: Performs mobility at highest level of function for planned discharge setting  See evaluation for individualized goals  Description: Treatment/Interventions: Functional transfer training,LE strengthening/ROM,Elevations,Therapeutic exercise,Cognitive reorientation,Patient/family training,Equipment eval/education,Bed mobility,Gait training,Compensatory technique education,Continued evaluation,Spoke to nursing          See flowsheet documentation for full assessment, interventions and recommendations  Outcome: Progressing  Note: Prognosis: Good  Problem List: Decreased strength,Decreased range of motion,Decreased endurance,Impaired balance,Decreased mobility,Decreased coordination,Impaired judgement,Decreased safety awareness,Pain,Orthopedic restrictions  Assessment: pt  needed A for BLEs for supine Reyna  Pt  noted with facial grimacing due to increased discomfort with mobility, reported increased pain towards the end of fist ambulation  Needed encouragement to complete the 28ft distance  However patient reported decreased pain post second ambulation  Noted LE instability initially with first ambulation  noted improved gait with steadier steps and gait speed as distance progressed in the first amb  trial and with second ambulation  needed overall decreased assist with ambulation this session  Pt  reported she does not have the spinal stimulator on as it gives her more pain  Pt  noted with decreased tremors and was able to stand with 1UE support   Pt  needed cues for hand placement for STS transfers  pt  able to do pericare in sitting with S  pt  with more alertness once seated at EOB  Needed encouragement to stay awake while performed LE Reyna in supine  pt  seated on recliner post session with alarm engaged and all needs within reach  Will continue to follow during the stay to maximize functional mobility     Barriers to Discharge: Inaccessible home environment,Decreased caregiver support  Barriers to Discharge Comments: oze? level of supervision at home? PT Discharge Recommendation: Post acute rehabilitation services          See flowsheet documentation for full assessment

## 2022-03-15 NOTE — PROGRESS NOTES
Progress Note - Orthopedics   Samantha QUINONEZ Jose Gunter 60 y o  female MRN: [id number]  Unit/Bed#: E2 -02 Encounter: [id number]    Assessment:  60 y o  female s/p right total knee arthroplasty POD 6, tardive dyskinesia    Plan:  · Tardive dyskinesia- medication coming from specialty pharmacy  Psych recommendations appreciated  · Dressing change completed  Continue daily dry dressing changes  · Pain control prn  · PT/OT- WBAT right LE   · Lovenox/TEDs/SCDs for DVT prophylaxis  · DC planning- rehab once approved  Subjective: Pt S&E  Patient sleeping upon entering the room  She reports bilateral knee pain, left knee bothering her more than the right at this time  Denies fevers, chills, abd pain, distal numbness, or tingling  Vitals: Blood pressure 141/91, pulse 78, temperature 97 8 °F (36 6 °C), temperature source Tympanic, resp  rate 16, height 5' (1 524 m), weight 81 2 kg (179 lb), SpO2 100 %, not currently breastfeeding  ,Body mass index is 34 96 kg/m²        Intake/Output Summary (Last 24 hours) at 3/15/2022 0756  Last data filed at 3/15/2022 0400  Gross per 24 hour   Intake 240 ml   Output 2200 ml   Net -1960 ml       Invasive Devices  Report    Peripheral Intravenous Line            Peripheral IV 03/15/22 Right Antecubital <1 day                Ortho Exam: rightLE:  Drsg:  C/D/I, sensation grossly intact L4, L5, S1, palpable pedal pulse, EHL/AT/GS intact    Lab, Imaging and other studies:   CBC:   Lab Results   Component Value Date    WBC 5 32 03/15/2022    HGB 9 4 (L) 03/15/2022    HCT 29 1 (L) 03/15/2022    MCV 90 03/15/2022     03/15/2022    MCH 29 1 03/15/2022    MCHC 32 3 03/15/2022    RDW 15 0 03/15/2022    MPV 9 5 03/15/2022     CMP:   Lab Results   Component Value Date    SODIUM 142 03/15/2022     03/15/2022    CO2 32 03/15/2022    BUN 6 03/15/2022    CREATININE 0 72 03/15/2022    CALCIUM 8 1 (L) 03/15/2022    EGFR 92 03/15/2022

## 2022-03-15 NOTE — PHYSICAL THERAPY NOTE
Physical Therapy Treatment Note     03/15/22 0924   Pain Assessment   Pain Assessment Tool 0-10   Pain Score 4   Wound 03/09/22 Knee Right   Date First Assessed/Time First Assessed: 03/09/22 1204   Location: Knee  Wound Location Orientation: Right  Wound Description (Comments): jann ann and cool temp pad  Incision's 1st Dressing: STERI STRIP 1/2 X 4 IN REINFORCED, DRESSING XEROFORM   Arnaud Manifold Arnaud Manifold Wound Description JULIETH   Wound Site Closure Adhesive closure strips   Drainage Amount None   Treatments Ice applied   Dressing   (ace wrap)   Dressing Status Clean;Dry; Intact   Restrictions/Precautions   Weight Bearing Precautions Per Order Yes   RLE Weight Bearing Per Order WBAT   Other Precautions Chair Alarm; Bed Alarm;Cognitive;WBS;Fall Risk;Pain  (speech impairment)   General   Chart Reviewed Yes   Subjective   Subjective Pt  in bed sleeping upon entry  Agreeable to PT  Bed Mobility   Supine to Sit 5  Supervision   Additional items Assist x 1;Bedrails; Increased time required;Verbal cues   Transfers   Sit to Stand 4  Minimal assistance   Additional items Assist x 1; Increased time required;Verbal cues;Armrests   Stand to Sit 4  Minimal assistance   Additional items Assist x 1; Increased time required;Verbal cues;Armrests   Stand pivot 4  Minimal assistance   Additional items Assist x 1; Increased time required;Verbal cues;Armrests   Toilet transfer 4  Minimal assistance   Additional items Assist x 1; Increased time required;Commode;Armrests  (with RW)   Ambulation/Elevation   Gait pattern Improper Weight shift; Antalgic;Decreased foot clearance; Forward Flexion;Narrow CALLY; Inconsistent zack; Foward flexed; Short stride; Ataxia; Excessively slow;Decreased heel strike;Decreased toe off;Decreased L stance  (LE instability initially)   Gait Assistance 4  Minimal assist   Additional items Assist x 1;Assist x 2;Verbal cues  (2dn A for chair follow)   Assistive Device Rolling walker   Distance 28ft x 2   Balance   Static Sitting Good Dynamic Sitting Fair +   Static Standing Fair   Dynamic Standing Poor +   Ambulatory Poor +   Endurance Deficit   Endurance Deficit Yes   Endurance Deficit Description pain   Activity Tolerance   Activity Tolerance Patient tolerated treatment well;Patient limited by pain   Nurse Made Aware Yes   Exercises   TKR Supine;Bilateral;AAROM;20 reps  (SLR, HS, AP, quad sets)   Assessment   Prognosis Good   Problem List Decreased strength;Decreased range of motion;Decreased endurance; Impaired balance;Decreased mobility; Decreased coordination; Impaired judgement;Decreased safety awareness;Pain;Orthopedic restrictions   Assessment pt  needed A for BLEs for supine Reyna  Pt  noted with facial grimacing due to increased discomfort with mobility, reported increased pain towards the end of fist ambulation  Needed encouragement to complete the 28ft distance  However patient reported decreased pain post second ambulation  Noted LE instability initially with first ambulation  noted improved gait with steadier steps and gait speed as distance progressed in the first amb  trial and with second ambulation  needed overall decreased assist with ambulation this session  Pt  reported she does not have the spinal stimulator on as it gives her more pain  Pt  noted with decreased tremors and was able to stand with 1UE support   Pt  needed cues for hand placement for STS transfers  pt  able to do pericare in sitting with S  pt  with more alertness once seated at EOB  Needed encouragement to stay awake while performed LE Reyna in supine  pt  seated on recliner post session with alarm engaged and all needs within reach  Will continue to follow during the stay to maximize functional mobility  Barriers to Discharge Inaccessible home environment;Decreased caregiver support   Goals   Patient Goals None reported   STG Expiration Date 03/19/22   PT Treatment Day 12   Plan   Treatment/Interventions LE strengthening/ROM; Functional transfer training; Therapeutic exercise;Patient/family training;Equipment eval/education;Gait training;Bed mobility;Spoke to nursing   Progress Progressing toward goals   PT Frequency Twice a day   Recommendation   PT Discharge Recommendation Post acute rehabilitation services   Equipment Recommended [address] Basic Mobility Inpatient   Turning in Bed Without Bedrails 3   Lying on Back to Sitting on Edge of Flat Bed 3   Moving Bed to Chair 3   Standing Up From Chair 3   Walk in Room 3   Climb 3-5 Stairs 2   Basic Mobility Inpatient Raw Score 17   Basic Mobility Standardized Score 39 67   Highest Level Of Mobility   -[facility] Goal 5: Stand one or more mins   -HLM Highest Level of Mobility 6: Walk 10 steps or more   JH-HLM Goal Achieved Yes   End of Consult   Patient Position at End of Consult Bedside chair;Bed/Chair alarm activated; All needs within reach         Watt , PTA    An AM-PAC basic mobility standardized score less than 42 9 suggest the patient may benefit from discharge to post-acute rehab services

## 2022-03-15 NOTE — QUICK NOTE
Quick Note - Psychiatry    Contacted by pharmacist regarding restarting Ingrezza at 80 mg, given that the patient has not had the medications for approximately a week  Will need baseline EKG to evaluate cardiac abnormalities, such as QTc prior to restarting  For Padmaja Began, contraindictions/cautions include congenital long QT syndrome, QT prolongation, bradycardia, recent MI, CHF  Side effects include: QT prolongation, hypersensitivity, Parkinsonism, somnolence, anticholinergic effects, ataxia, headache, akathisia, N/V, joint pain  Plan:  - ATB-53 prior to starting Padmaja Began  - Follow up EKG-12 after initiation of treatment to monitor QTc  - Restart Ingrezza 80 mg after EKG-12  - Primary team notified  - Monitor for symptoms

## 2022-03-15 NOTE — PLAN OF CARE
Problem: PHYSICAL THERAPY ADULT  Goal: Performs mobility at highest level of function for planned discharge setting  See evaluation for individualized goals  Description: Treatment/Interventions: Functional transfer training,LE strengthening/ROM,Elevations,Therapeutic exercise,Cognitive reorientation,Patient/family training,Equipment eval/education,Bed mobility,Gait training,Compensatory technique education,Continued evaluation,Spoke to nursing          See flowsheet documentation for full assessment, interventions and recommendations  3/15/2022 1512 by Kateryna Elizalde PTA  Outcome: Progressing  Note: Prognosis: Good  Problem List: Decreased strength,Decreased range of motion,Decreased endurance,Impaired balance,Decreased mobility,Decreased cognition,Impaired judgement,Decreased coordination,Pain,Orthopedic restrictions,Obesity  Assessment: Pt  improving in her overall mobility, ambulation distance and tolerance to activities  Pt  reuqired less assistance for all mobility  Min A progressed to CGA to CS for ambualtion as distance progressed  Assist provided in negotiating RW during ambualtion as patient tend to keep it too close to her body and unable to follow cues to keep at a good distance  Pt  needed only S for supine to sit transfer and Min A with LEs for sit to supin position  pt  positioned back in bed with CP on and bed alarm engaged and all needs within reach  Pt  reported maybe by next week I can do steps  Performed BSC transfer and pericare in satnding with CS/CGA  No knee buckling noted t/o session  OCcc  ataxic movements noted  Pt  educated not to have anything under her knees when in bed and patient also able to scoot herself in bed towards [facility] with use of bedrails overhead  Will continue to progress patient as tlerated to [facility] PT goals  Barriers to Discharge: Inaccessible home environment,Decreased caregiver support  Barriers to Discharge Comments: zoe? level of supervision at home?      PT Discharge Recommendation: Post acute rehabilitation services          See flowsheet documentation for full assessment  3/15/2022 1023 by Atif Reyes PTA  Outcome: Progressing  Note: Prognosis: Good  Problem List: Decreased strength,Decreased range of motion,Decreased endurance,Impaired balance,Decreased mobility,Decreased coordination,Impaired judgement,Decreased safety awareness,Pain,Orthopedic restrictions  Assessment: pt  needed A for BLEs for supine Reyna  Pt  noted with facial grimacing due to increased discomfort with mobility, reported increased pain towards the end of fist ambulation  Needed encouragement to complete the 28ft distance  However patient reported decreased pain post second ambulation  Noted LE instability initially with first ambulation  noted improved gait with steadier steps and gait speed as distance progressed in the first amb  trial and with second ambulation  needed overall decreased assist with ambulation this session  Pt  reported she does not have the spinal stimulator on as it gives her more pain  Pt  noted with decreased tremors and was able to stand with 1UE support   Pt  needed cues for hand placement for STS transfers  pt  able to do pericare in sitting with S  pt  with more alertness once seated at EOB  Needed encouragement to stay awake while performed LE Reyna in supine  pt  seated on recliner post session with alarm engaged and all needs within reach  Will continue to follow during the stay to maximize functional mobility  Barriers to Discharge: Inaccessible home environment,Decreased caregiver support  Barriers to Discharge Comments: zoe? level of supervision at home? PT Discharge Recommendation: Post acute rehabilitation services          See flowsheet documentation for full assessment       3/15/2022 1013 by Atif Reyes PTA  Outcome: Progressing  Note: Prognosis: Good  Problem List: Decreased strength,Decreased range of motion,Decreased endurance,Impaired balance,Decreased mobility,Decreased coordination,Impaired judgement,Decreased safety awareness,Pain,Orthopedic restrictions  Assessment: pt  needed A for BLEs for supine Reyna  Pt  noted with facial grimacing due to increased discomfort with mobility, reported increased pain towards the end of fist ambulation  Needed encouragement to complete the 28ft distance  However patient reported decreased pain post second ambulation  Noted LE instability initially with first ambulation  noted improved gait with steadier steps and gait speed as distance progressed in the first amb  trial and with second ambulation  needed overall decreased assist with ambulation this session  Pt  reported she does not have the spinal stimulator on as it gives her more pain  Pt  noted with decreased tremors and was able to stand with 1UE support   Pt  needed cues for hand placement for STS transfers  pt  able to do pericare in sitting with S  pt  with more alertness once seated at EOB  Needed encouragement to stay awake while performed LE Reyna in supine  pt  seated on recliner post session with alarm engaged and all needs within reach  Will continue to follow during the stay to maximize functional mobility  Barriers to Discharge: Inaccessible home environment,Decreased caregiver support  Barriers to Discharge Comments: zoe? level of supervision at home? PT Discharge Recommendation: Post acute rehabilitation services          See flowsheet documentation for full assessment

## 2022-03-15 NOTE — PLAN OF CARE
Problem: OCCUPATIONAL THERAPY ADULT  Goal: Performs self-care activities at highest level of function for planned discharge setting  See evaluation for individualized goals  Description: Treatment Interventions: ADL retraining,Functional transfer training,UE strengthening/ROM,Endurance training,Cognitive reorientation,Patient/family training,Equipment evaluation/education,Compensatory technique education  Equipment Recommended: Bedside commode       See flowsheet documentation for full assessment, interventions and recommendations  Outcome: Progressing  Note: Limitation: Decreased ADL status,Decreased UE strength,Decreased Safe judgement during ADL,Decreased cognition,Decreased endurance,Decreased high-level ADLs  Prognosis: Good  Assessment: Pt seen for 40 min tx session with focus on functional balance, functional mobility, ADL status, transfer safety, and cognition  Pt able to tolerate OOB mobility; sitting balance=f+/f, standing balance=f/f-  Pt demonstrating need for assistance with LE ADLs  Pt required verbal/physical cues to maintain transfer safety  Pt lethargic today, but able to demonstrate good cognition(i e orientation, memory)  Therapist reviewed functional knee flex/extension(i e with positioning, LE ADLs); pt would benefit from continued review of information  Pt would benefit from inpt rehab to improve her overall level of independence  Tx tolerated well  Pt pleasant and cooperative with tx session  Will continue        OT Discharge Recommendation: Post acute rehabilitation services

## 2022-03-15 NOTE — PHYSICAL THERAPY NOTE
Physical Therapy Treatment Note     03/15/22 1501   PT Last Visit   PT Visit Date 03/15/22   Note Type   Note Type Treatment   Pain Assessment   Pain Assessment Tool 0-10   Pain Score 8   Pain Location/Orientation Location: Knee;Orientation: Right   Restrictions/Precautions   Weight Bearing Precautions Per Order Yes   RLE Weight Bearing Per Order WBAT   Other Precautions Cognitive; Bed Alarm; Fall Risk;Pain   General   Chart Reviewed Yes   Cognition   Overall Cognitive Status WFL   Subjective   Subjective Pt  in bed upon entry  Pt  reported 8/10 pain in R knee however no facial distress noted  Bed Mobility   Supine to Sit 5  Supervision   Additional items Assist x 1; Increased time required; Bedrails  (HOB flat)   Sit to Supine 4  Minimal assistance   Additional items LE management; Increased time required;Assist x 1;Bedrails   Transfers   Sit to Stand 4  Minimal assistance   Additional items Assist x 1; Armrests; Verbal cues; Increased time required   Stand to Sit 5  Supervision   Additional items Assist x 1; Increased time required;Armrests; Verbal cues   Stand pivot 4  Minimal assistance   Additional items Assist x 1; Increased time required;Verbal cues;Armrests   Toilet transfer 4  Minimal assistance   Additional items Assist x 1; Increased time required;Commode  (wit RW)   Ambulation/Elevation   Gait pattern Improper Weight shift; Forward Flexion;Decreased foot clearance; Inconsistent zack; Foward flexed; Ataxia; Short stride; Excessively slow;Decreased heel strike;Decreased toe off   Gait Assistance 4  Minimal assist  (Min A - CGA- CS)   Additional items Assist x 1;Verbal cues   Assistive Device Rolling walker   Distance 52ft   Balance   Static Sitting Good   Dynamic Sitting Fair +   Static Standing Fair   Dynamic Standing Poor +   Ambulatory Poor +   Endurance Deficit   Endurance Deficit Yes   Endurance Deficit Description Pain   Activity Tolerance   Activity Tolerance Patient tolerated treatment well   Nurse Made Aware yes   Assessment   Prognosis Good   Problem List Decreased strength;Decreased range of motion;Decreased endurance; Impaired balance;Decreased mobility; Decreased cognition; Impaired judgement;Decreased coordination;Pain;Orthopedic restrictions; Obesity   Assessment Pt  improving in her overall mobility, ambulation distance and tolerance to activities  Pt  reuqired less assistance for all mobility  Min A progressed to CGA to CS for ambualtion as distance progressed  Assist provided in negotiating RW during ambualtion as patient tend to keep it too close to her body and unable to follow cues to keep at a good distance  Pt  needed only S for supine to sit transfer and Min A with LEs for sit to supin position  pt  positioned back in bed with CP on and bed alarm engaged and all needs within reach  Pt  reported maybe by next week I can do steps  Performed BSC transfer and pericare in satnding with CS/CGA  No knee buckling noted t/o session  OCcc  ataxic movements noted  Pt  educated not to have anything under her knees when in bed and patient also able to scoot herself in bed towards [facility] with use of bedrails overhead  Will continue to progress patient as tlerated to [facility] PT goals  Barriers to Discharge Inaccessible home environment;Decreased caregiver support   Goals   Patient Goals None reproted   STG Expiration Date 03/19/22   PT Treatment Day 13   Plan   Treatment/Interventions Functional transfer training;LE strengthening/ROM; Therapeutic exercise;Patient/family training;Equipment eval/education;Gait training;Bed mobility;Spoke to nursing   Progress Progressing toward goals   PT Frequency Twice a day   Recommendation   PT Discharge Recommendation Post acute rehabilitation services   AM-PAC Basic Mobility Inpatient   Turning in Bed Without Bedrails 3   Lying on Back to Sitting on Edge of Flat Bed 3   Moving Bed to Chair 3   Standing Up From Chair 3   Walk in Room 3   Climb 3-5 Stairs 2   Basic Mobility Inpatient Raw Score 17   Basic Mobility Standardized Score 39 67   Highest Level Of Mobility   -[facility] Goal 5: Stand one or more mins   -[facility] Highest Level of Mobility 6: Walk 10 steps or more   -HL Goal Achieved Yes   End of Consult   Patient Position at End of Consult Supine;Bed/Chair alarm activated; All needs within reach         [place] [facility] PTA    An AM-PAC basic mobility standardized score less than 42 9 suggest the patient may benefit from discharge to post-acute rehab services

## 2022-03-15 NOTE — CASE MANAGEMENT
Case Management Progress Note    Patient name Giuliana Fernandez 2 /E2 -* MRN [id number]  : 1965 Date 3/15/2022       LOS (days): 5  Geometric Mean LOS (GMLOS) (days): 1 80  Days to GMLOS:-3        OBJECTIVE:        Current admission status: Inpatient  Preferred Pharmacy:   [address] [place] [address], [address] [place], [place] [address] [facility]  [address] [facility] [address]  Phone: [phone] Fax: [phone]    [id number] [address] [address]  Phone: [phone] Fax: [phone]    Primary Care Provider: Hector Bradley DO    Primary Insurance: [id number] REP  Secondary Insurance: [address] NOTE:    Pt has been approved for SH-TCF for STR  CM requested authorization  CM will advise when a determination has been made

## 2022-03-15 NOTE — OCCUPATIONAL THERAPY NOTE
Occupational Therapy Progress Note(time=1020-1100)     Patient Name: Jonh Mccann  SWZND'L Date: 3/15/2022  Problem List  Principal Problem:    S/P total knee arthroplasty, right  Active Problems:    Chronic pain disorder    Lumbar radiculopathy    Bipolar II disorder (HCC)    Esophageal reflux    Generalized anxiety disorder    Essential hypertension    Tardive dyskinesia    History of CVA (cerebrovascular accident)    Constipation     03/15/22 1100   Note Type   Note Type Treatment   Restrictions/Precautions   Weight Bearing Precautions Per Order Yes   RLE Weight Bearing Per Order WBAT   Other Precautions Cognitive; Chair Alarm; Bed Alarm; Fall Risk;Pain   Pain Assessment   Pain Assessment Tool 0-10   Pain Score 8  (pt was asleep prior to pain assessment)   Pain Location/Orientation Orientation: Right;Location: Knee   Pain Rating: FLACC (Rest) - Face 0   Pain Rating: FLACC (Rest) - Legs 0   Pain Rating: FLACC (Rest) - Activity 0   Pain Rating: FLACC (Rest) - Cry 1   Pain Rating: FLACC (Rest) - Consolability 0   Score: FLACC (Rest) 1   ADL   Where Assessed Edge of bed   Grooming Assistance 5  Supervision/Setup   UB Bathing Assistance 4  Minimal Assistance   LB Bathing Assistance 3  Moderate Assistance   UB Dressing Assistance 5  Supervision/Setup   LB Dressing Assistance 3  Moderate Assistance   LB Dressing Deficit Thread RLE into pants; Thread LLE into pants  (dependent with socks)   Functional Standing Tolerance   Time 2-3mins   Bed Mobility   Rolling R 5  Supervision   Rolling L 5  Supervision   Sit to Supine 3  Moderate assistance   Additional items Assist x 1; Increased time required;Verbal cues;LE management   Transfers   Sit to Stand 4  Minimal assistance   Additional items Assist x 1; Increased time required;Verbal cues   Stand to Sit 4  Minimal assistance   Additional items Assist x 1; Increased time required;Verbal cues   Functional Mobility   Functional Mobility 4  Minimal assistance   Additional Comments x1   Additional items Rolling walker   Cognition   Overall Cognitive Status WFL   Arousal/Participation Lethargic   Attention Attends with cues to redirect   Orientation Level Oriented X4   Memory Decreased recall of precautions;Decreased short term memory   Following Commands Follows one step commands with increased time or repetition   Comments lethargic today, but able to verbalize tx carryover   Activity Tolerance   Activity Tolerance Patient limited by fatigue;Patient limited by pain   Medical Staff Made Aware nsg, P T , CM   Assessment   Assessment Pt seen for 40 min tx session with focus on functional balance, functional mobility, ADL status, transfer safety, and cognition  Pt able to tolerate OOB mobility; sitting balance=f+/f, standing balance=f/f-  Pt demonstrating need for assistance with LE ADLs  Pt required verbal/physical cues to maintain transfer safety  Pt lethargic today, but able to demonstrate good cognition(i e orientation, memory)  Therapist reviewed functional knee flex/extension(i e with positioning, LE ADLs); pt would benefit from continued review of information  Pt would benefit from inpt rehab to improve her overall level of independence  Tx tolerated well  Pt pleasant and cooperative with tx session  Will continue  Plan   Treatment Interventions ADL retraining;Functional transfer training;UE strengthening/ROM; Endurance training;Cognitive reorientation;Patient/family training;Equipment evaluation/education; Compensatory technique education   Goal Expiration Date 03/24/22   OT Treatment Day 3   OT Frequency 3-5x/wk   Recommendation   OT Discharge Recommendation Post acute rehabilitation services   AM-PAC Daily Activity Inpatient   Lower Body Dressing 2   Bathing 2   Toileting 2   Upper Body Dressing 3   Grooming 4   Eating 4   Daily Activity Raw Score 17   Daily Activity Standardized Score (Calc for Raw Score >=11) 37 26   AM-PAC Applied Cognition Inpatient   Following a Speech/Presentation 4   Understanding Ordinary Conversation 4   Taking Medications 3   Remembering Where Things Are Placed or Put Away 3   Remembering List of 4-5 Errands 3   Taking Care of Complicated Tasks 3   Applied Cognition Raw Score 20   Applied Cognition Standardized Score 41 76   Charissa Avila, OT

## 2022-03-15 NOTE — PLAN OF CARE
Problem: MOBILITY - ADULT  Goal: Maintain or return to baseline ADL function  Description: INTERVENTIONS:  -  Assess patient's ability to carry out ADLs; assess patient's baseline for ADL function and identify physical deficits which impact ability to perform ADLs (bathing, care of mouth/teeth, toileting, grooming, dressing, etc )  - Assess/evaluate cause of self-care deficits   - Assess range of motion  - Assess patient's mobility; develop plan if impaired  - Assess patient's need for assistive devices and provide as appropriate  - Encourage maximum independence but intervene and supervise when necessary  - Involve family in performance of ADLs  - Assess for home care needs following discharge   - Consider OT consult to assist with ADL evaluation and planning for discharge  - Provide patient education as appropriate  Outcome: Progressing  Goal: Maintains/Returns to pre admission functional level  Description: INTERVENTIONS:  - Perform BMAT or MOVE assessment daily    - Set and communicate daily mobility goal to care team and patient/family/caregiver  - Collaborate with rehabilitation services on mobility goals if consulted  - Perform Range of Motion 3 times a day  - Reposition patient every 2 hours    - Dangle patient 3 times a day  - Stand patient 3 times a day  - Ambulate patient 3 times a day  - Out of bed to chair 3 times a day   - Out of bed for meals 3 times a day  - Out of bed for toileting  - Record patient progress and toleration of activity level   Outcome: Progressing     Problem: Potential for Falls  Goal: Patient will remain free of falls  Description: INTERVENTIONS:  - Educate patient/family on patient safety including physical limitations  - Instruct patient to call for assistance with activity   - Consult OT/PT to assist with strengthening/mobility   - Keep Call bell within reach  - Keep bed low and locked with side rails adjusted as appropriate  - Keep care items and personal belongings within reach  - Initiate and maintain comfort rounds  - Make Fall Risk Sign visible to staff  - Offer Toileting every 2 Hours, in advance of need  - Initiate/Maintain bed alarm  - Obtain necessary fall risk management equipment: bed alarm  - Apply yellow socks and bracelet for high fall risk patients  - Consider moving patient to room near nurses station  Outcome: Progressing     Problem: PAIN - ADULT  Goal: Verbalizes/displays adequate comfort level or baseline comfort level  Description: Interventions:  - Encourage patient to monitor pain and request assistance  - Assess pain using appropriate pain scale  - Administer analgesics based on type and severity of pain and evaluate response  - Implement non-pharmacological measures as appropriate and evaluate response  - Consider cultural and social influences on pain and pain management  - Notify physician/advanced practitioner if interventions unsuccessful or patient reports new pain  Outcome: Progressing     Problem: DISCHARGE PLANNING  Goal: Discharge to home or other facility with appropriate resources  Description: INTERVENTIONS:  - Identify barriers to discharge w/patient and caregiver  - Arrange for needed discharge resources and transportation as appropriate  - Identify discharge learning needs (meds, wound care, etc )  - Arrange for interpretive services to assist at discharge as needed  - Refer to Case Management Department for coordinating discharge planning if the patient needs post-hospital services based on physician/advanced practitioner order or complex needs related to functional status, cognitive ability, or social support system  Outcome: Progressing

## 2022-03-15 NOTE — QUICK NOTE
Psychiatry contacted me and requested EKG prior to the start ingrezza  Will obtain ekg   Danita Castro was delivered from the [facility] today

## 2022-03-15 NOTE — PROGRESS NOTES
[facility]  Progress Note - Kaya Gustafson 1965, 60 y o  female MRN: [id number]  Unit/Bed#: E2 -02 Encounter: [id number]  Primary Care Provider: Elis Mcguire DO   Date and time admitted to hospital: 3/9/2022  9:14 AM    Constipation  Assessment & Plan  Bowel regimen    History of CVA (cerebrovascular accident)  Assessment & Plan  Continue aspirin and statin    Tardive dyskinesia  Assessment & Plan  Continue Lyrica  Valbenazine not on formulary, Family was unable to find it at home  [facility]  [facility]  [facility] today  Discussed with psychiatric pharmacist, recommending clonazepam 0 5 mg b i d  P r n      Essential hypertension  Assessment & Plan  Continue inderal and prazosin  BP currently controlled    Generalized anxiety disorder  Assessment & Plan  Continue Paxil and BuSpar      Esophageal reflux  Assessment & Plan  Continue PPI    Bipolar II disorder ([facility] [address] )  Assessment & Plan  Pt may follow up with Dr Yovani Arevalo outpt  Continue lithium, and Seroquel bedtime    Lumbar radiculopathy  Assessment & Plan  · s/p thoracolumbar decompression and fusion in 2018 in [place] with subsequent laminectomy syndrome   · S/p thoracic spinal cord stimulator placement in 7/28/20  · Recommend continue follow-up with Neurosurgery outpatient for further evaluation as patient does not use spinal stimulator at this time      Chronic pain disorder  Assessment & Plan  History of opioid dependence, reporting bilateral knee pain  Caution regarding significant opioids for pain control  Recommend topical agents, Voltaren, diclofenac, Lidoderm patches  Minimize oxycodone as needed to work with physical therapy    * S/P total knee arthroplasty, right  Assessment & Plan  Status post 3/9 right total knee arthroplasty  PT and OT currently recommend rehab  H/H stable  DVTppx with lovenox, will need 30 days  Follow up with ortho in 2 weeks  Appreciate psych evaluation  She is cleared for discharge  VTE Pharmacologic Prophylaxis: lovenox  Mechanical VTE Prophylaxis in Place: Yes    Education and Discussions with Family / Patient: updated her sister yesterday  Time Spent for Care: 20 minutes  More than 50% of total time spent on counseling and coordination of care as described above  Current Length of Stay: 5 day(s)  Current Patient Status: Inpatient     Discharge Plan / Estimated Discharge Date: awaiting str  Code Status: Level 1 - Full Code      Subjective:   Pt seen and examined  Pt seems more calm today  Pain is better controlled  She is still having repetitive movements of jaw, lips, and tongue but not as frequent  She reports she is constipated  Did discuss with her nurse who had given her miralax this am  No other problems were reported  Objective:     Vitals:   Temp (24hrs), Av 4 °F (36 3 °C), Min:96 4 °F (35 8 °C), Max:98 °F (36 7 °C)    Temp:  [96 4 °F (35 8 °C)-98 °F (36 7 °C)] 97 8 °F (36 6 °C)  HR:  [75-87] 78  Resp:  [16-18] 16  BP: (117-141)/(71-91) 141/91  SpO2:  [98 %-100 %] 100 %  Body mass index is 34 96 kg/m²  Input and Output Summary (last 24 hours): Intake/Output Summary (Last 24 hours) at 3/15/2022 1118  Last data filed at 3/15/2022 0400  Gross per 24 hour   Intake 240 ml   Output 1750 ml   Net -1510 ml       Physical Exam:   Physical Exam  Constitutional:       Appearance: Normal appearance  HENT:      Head: Normocephalic and atraumatic  Eyes:      Extraocular Movements: Extraocular movements intact  Pupils: Pupils are equal, round, and reactive to light  Cardiovascular:      Rate and Rhythm: Normal rate and regular rhythm  Heart sounds: No murmur heard  No friction rub  No gallop  Pulmonary:      Effort: Pulmonary effort is normal  No respiratory distress  Breath sounds: Normal breath sounds  No wheezing or rales     Abdominal:      General: Bowel sounds are normal  There is no distension  Palpations: Abdomen is soft  Tenderness: There is no abdominal tenderness  There is no guarding  Musculoskeletal:      Right lower leg: No edema  Left lower leg: No edema  Neurological:      Mental Status: She is alert and oriented to person, place, and time  Comments: Less repetitive movement of lips and jaw  Still with intermittent slurred speech due to above  Additional Data:     Labs:  Results from last 7 days   Lab Units 03/15/22  0532 03/12/22  0456 03/12/22  0456   WBC Thousand/uL 5 32   < > 7 63   HEMOGLOBIN g/dL 9 4*   < > 9 9*   HEMATOCRIT % 29 1*   < > 30 2*   PLATELETS Thousands/uL 267   < > 269   NEUTROS PCT %  --   --  61   LYMPHS PCT %  --   --  22   MONOS PCT %  --   --  12   EOS PCT %  --   --  5    < > = values in this interval not displayed       Results from last 7 days   Lab Units 03/15/22  0532   SODIUM mmol/L 142   POTASSIUM mmol/L 3 9   CHLORIDE mmol/L 105   CO2 mmol/L 32   BUN mg/dL 6   CREATININE mg/dL 0 72   ANION GAP mmol/L 5   CALCIUM mg/dL 8 1*   GLUCOSE RANDOM mg/dL 94                       Recent Cultures (last 7 days):           Lines/Drains:  Invasive Devices  Report    Peripheral Intravenous Line            Peripheral IV 03/15/22 Right Antecubital <1 day              Last 24 Hours Medication List:   Current Facility-Administered Medications   Medication Dose Route Frequency Provider Last Rate    acetaminophen  975 mg Oral [facility] [address] PA-C      ascorbic acid  500 mg Oral Daily [address] PA-C      aspirin  81 mg Oral Daily [address], [place]      atorvastatin  40 mg Oral Daily With [employer]ACQUI      busPIRone  20 mg Oral TID Rivka Seals PA-C      calcium carbonate  1,000 mg Oral Daily PRN [address] Singh PA-C      clonazePAM  0 5 mg Oral BID PRN Emma Michel MD      enoxaparin  40 mg Subcutaneous Daily [address] PA-C      folic acid  1 mg Oral Daily [address] Francisco, JACQUI      HYDROmorphone  0 5 mg Intravenous Q4H PRN [employer] Francisco, JACQUI      hydrOXYzine HCL  50 mg Oral TID PRN Rober Maury, JACQUI      lithium carbonate  300 mg Oral HS [place] [employer] Robles, JACQUI      mirtazapine  7 5 mg Oral HS PRN Rober Maury, JACQUI      oxyCODONE  10 mg Oral Q4H PRN [place] [employer] Robles, JACQUI      oxyCODONE  5 mg Oral Q4H PRN [place] [employer] Robles, JACQUI      pantoprazole  20 mg Oral Early Morning [place], [place]      PARoxetine  60 mg Oral Daily [place] Robles, JACQUI      phenol  1 spray Mouth/Throat Q2H PRN Kurtis Baron PA-C      polyethylene glycol  17 g Oral Daily Jalen Kulkarni MD      potassium chloride  20 mEq Oral Daily [place] [employer] Robles, JACQUI      prazosin  2 mg Oral HS [place] [employer] Robles, JACQUI      pregabalin  150 mg Oral TID [facility], JACQUI      promethazine  25 mg Intramuscular Q6H PRN [employer] Francisco, JACQUI      propranolol  10 mg Oral BID before breakfast/lunch Leida Daniels DO      QUEtiapine  50 mg Oral HS [facility], JACQUI      senna  2 tablet Oral HS Jalen Kulkarni MD      Valbenazine Tosylate  80 mg Oral Daily [place], [place]          Today, Patient Was Seen By: Leida Daniels DO

## 2022-03-16 LAB
ANION GAP SERPL CALCULATED.3IONS-SCNC: 7 MMOL/L (ref 4–13)
ATRIAL RATE: 70 BPM
BUN SERPL-MCNC: 10 MG/DL (ref 5–25)
CALCIUM SERPL-MCNC: 7.9 MG/DL (ref 8.3–10.1)
CHLORIDE SERPL-SCNC: 106 MMOL/L (ref 100–108)
CO2 SERPL-SCNC: 31 MMOL/L (ref 21–32)
CREAT SERPL-MCNC: 0.77 MG/DL (ref 0.6–1.3)
GFR SERPL CREATININE-BSD FRML MDRD: 85 ML/MIN/1.73SQ M
GLUCOSE SERPL-MCNC: 96 MG/DL (ref 65–140)
MAGNESIUM SERPL-MCNC: 2 MG/DL (ref 1.6–2.6)
P AXIS: 53 DEGREES
POTASSIUM SERPL-SCNC: 3.9 MMOL/L (ref 3.5–5.3)
PR INTERVAL: 138 MS
QRS AXIS: 50 DEGREES
QRSD INTERVAL: 92 MS
QT INTERVAL: 420 MS
QTC INTERVAL: 453 MS
SODIUM SERPL-SCNC: 144 MMOL/L (ref 136–145)
T WAVE AXIS: 22 DEGREES
VENTRICULAR RATE: 70 BPM

## 2022-03-16 PROCEDURE — 83735 ASSAY OF MAGNESIUM: CPT | Performed by: INTERNAL MEDICINE

## 2022-03-16 PROCEDURE — 97116 GAIT TRAINING THERAPY: CPT

## 2022-03-16 PROCEDURE — 93010 ELECTROCARDIOGRAM REPORT: CPT | Performed by: INTERNAL MEDICINE

## 2022-03-16 PROCEDURE — 80048 BASIC METABOLIC PNL TOTAL CA: CPT | Performed by: INTERNAL MEDICINE

## 2022-03-16 PROCEDURE — 93005 ELECTROCARDIOGRAM TRACING: CPT

## 2022-03-16 PROCEDURE — 99233 SBSQ HOSP IP/OBS HIGH 50: CPT | Performed by: INTERNAL MEDICINE

## 2022-03-16 PROCEDURE — 97530 THERAPEUTIC ACTIVITIES: CPT

## 2022-03-16 RX ORDER — LORAZEPAM 0.5 MG/1
0.5 TABLET ORAL ONCE
Status: COMPLETED | OUTPATIENT
Start: 2022-03-16 | End: 2022-03-16

## 2022-03-16 RX ADMIN — OXYCODONE HYDROCHLORIDE 10 MG: 10 TABLET ORAL at 22:07

## 2022-03-16 RX ADMIN — PREGABALIN 150 MG: 75 CAPSULE ORAL at 22:02

## 2022-03-16 RX ADMIN — POTASSIUM CHLORIDE 20 MEQ: 1500 TABLET, EXTENDED RELEASE ORAL at 09:21

## 2022-03-16 RX ADMIN — LITHIUM CARBONATE 300 MG: 300 TABLET, FILM COATED, EXTENDED RELEASE ORAL at 22:03

## 2022-03-16 RX ADMIN — ACETAMINOPHEN 325MG 975 MG: 325 TABLET ORAL at 14:03

## 2022-03-16 RX ADMIN — LORAZEPAM 0.5 MG: 0.5 TABLET ORAL at 17:58

## 2022-03-16 RX ADMIN — ACETAMINOPHEN 325MG 975 MG: 325 TABLET ORAL at 22:02

## 2022-03-16 RX ADMIN — BUSPIRONE HYDROCHLORIDE 20 MG: 10 TABLET ORAL at 09:21

## 2022-03-16 RX ADMIN — PREGABALIN 150 MG: 75 CAPSULE ORAL at 16:28

## 2022-03-16 RX ADMIN — SENNOSIDES 17.2 MG: 8.6 TABLET ORAL at 22:02

## 2022-03-16 RX ADMIN — OXYCODONE HYDROCHLORIDE 5 MG: 5 TABLET ORAL at 18:11

## 2022-03-16 RX ADMIN — BUSPIRONE HYDROCHLORIDE 20 MG: 10 TABLET ORAL at 16:28

## 2022-03-16 RX ADMIN — POLYETHYLENE GLYCOL 3350 17 G: 17 POWDER, FOR SOLUTION ORAL at 09:21

## 2022-03-16 RX ADMIN — PAROXETINE 60 MG: 20 TABLET, FILM COATED ORAL at 09:21

## 2022-03-16 RX ADMIN — PREGABALIN 150 MG: 75 CAPSULE ORAL at 09:21

## 2022-03-16 RX ADMIN — BUSPIRONE HYDROCHLORIDE 20 MG: 10 TABLET ORAL at 22:02

## 2022-03-16 RX ADMIN — VALBENAZINE 80 MG: 80 CAPSULE ORAL at 15:08

## 2022-03-16 RX ADMIN — ATORVASTATIN CALCIUM 40 MG: 40 TABLET, FILM COATED ORAL at 16:28

## 2022-03-16 RX ADMIN — PANTOPRAZOLE SODIUM 20 MG: 20 TABLET, DELAYED RELEASE ORAL at 06:32

## 2022-03-16 RX ADMIN — OXYCODONE HYDROCHLORIDE 10 MG: 10 TABLET ORAL at 06:31

## 2022-03-16 RX ADMIN — ASPIRIN 81 MG CHEWABLE TABLET 81 MG: 81 TABLET CHEWABLE at 09:21

## 2022-03-16 RX ADMIN — OXYCODONE HYDROCHLORIDE AND ACETAMINOPHEN 500 MG: 500 TABLET ORAL at 09:21

## 2022-03-16 RX ADMIN — QUETIAPINE FUMARATE 50 MG: 25 TABLET ORAL at 22:02

## 2022-03-16 RX ADMIN — FOLIC ACID 1 MG: 1 TABLET ORAL at 09:21

## 2022-03-16 RX ADMIN — ACETAMINOPHEN 325MG 975 MG: 325 TABLET ORAL at 06:33

## 2022-03-16 RX ADMIN — PROPRANOLOL HYDROCHLORIDE 10 MG: 20 TABLET ORAL at 06:32

## 2022-03-16 RX ADMIN — ENOXAPARIN SODIUM 40 MG: 40 INJECTION SUBCUTANEOUS at 02:52

## 2022-03-16 RX ADMIN — OXYCODONE HYDROCHLORIDE 10 MG: 10 TABLET ORAL at 11:58

## 2022-03-16 NOTE — PLAN OF CARE
Problem: PHYSICAL THERAPY ADULT  Goal: Performs mobility at highest level of function for planned discharge setting  See evaluation for individualized goals  Description: Treatment/Interventions: Functional transfer training,LE strengthening/ROM,Elevations,Therapeutic exercise,Cognitive reorientation,Patient/family training,Equipment eval/education,Bed mobility,Gait training,Compensatory technique education,Continued evaluation,Spoke to nursing          See flowsheet documentation for full assessment, interventions and recommendations  3/16/2022 1545 by Kayla Weathers PTA  Outcome: Progressing  Note: Prognosis: Good  Problem List: Decreased strength,Decreased range of motion,Decreased endurance,Impaired balance,Decreased mobility,Impaired judgement,Pain,Orthopedic restrictions,Obesity,Decreased coordination  Assessment: Pt  agreeable only to ambulation for PT session  Noted increased difficulty with STS transfers this session  Cues for hand placement givewn 100% of time  pt  had a posterior LOB and impulsicve descend to EOB  Pt  able to perform supine to sit and back transfer with S  Pt  was limited in her [facility] activities this session  Pt  able to perform pericare in standing with CGA  Will continue per POC  Barriers to Discharge: Inaccessible home environment,Decreased caregiver support  Barriers to Discharge Comments: zoe? level of supervision at home? PT Discharge Recommendation: Post acute rehabilitation services          See flowsheet documentation for full assessment  3/16/2022 1029 by Kayla Weathers PTA  Outcome: Progressing  Note: Prognosis: Good  Problem List: Decreased strength,Decreased range of motion,Decreased endurance,Decreased mobility,Decreased coordination,Decreased cognition,Impaired judgement,Pain,Orthopedic restrictions  Assessment: Pt  needed no physical assist for supine to sit transfer to the R but used bedrails for support   pt  reported terrible pain when asked however no facial grimacing or discomfort noted except for STS transfer  Slight antalgic gait noted with RW and CGA  No 2nd A for chair follow for ambulation given this session  Pt  reported I am doing too much  I should do this tomorrow  Needed encourgaement to participate in ambulating distance of 36ft  Educated patient in the improtance of increasing her mobility and endurance  Pt  seated at EOB post session with nursing students present in room and all needs within reach  Pt  is progressing with her mobility however need further progression in ambulation and stair negotiation  Will continue per POC  Pt  was oriented to place and situation  Tremors noted in UEs  needed cueing for hand placement for STS transfers  Barriers to Discharge: Inaccessible home environment,Decreased caregiver support  Barriers to Discharge Comments: zoe? level of supervision at home? PT Discharge Recommendation: Post acute rehabilitation services          See flowsheet documentation for full assessment

## 2022-03-16 NOTE — PLAN OF CARE
Problem: PHYSICAL THERAPY ADULT  Goal: Performs mobility at highest level of function for planned discharge setting  See evaluation for individualized goals  Description: Treatment/Interventions: Functional transfer training,LE strengthening/ROM,Elevations,Therapeutic exercise,Cognitive reorientation,Patient/family training,Equipment eval/education,Bed mobility,Gait training,Compensatory technique education,Continued evaluation,Spoke to nursing          See flowsheet documentation for full assessment, interventions and recommendations  Outcome: Progressing  Note: Prognosis: Good  Problem List: Decreased strength,Decreased range of motion,Decreased endurance,Decreased mobility,Decreased coordination,Decreased cognition,Impaired judgement,Pain,Orthopedic restrictions  Assessment: Pt  needed no physical assist for supine to sit transfer to the R but used bedrails for support  pt  reported terrible pain when asked however no facial grimacing or discomfort noted except for STS transfer  Slight antalgic gait noted with RW and CGA  No 2nd A for chair follow for ambulation given this session  Pt  reported I am doing too much  I should do this tomorrow  Needed encourgaement to participate in ambulating distance of 36ft  Educated patient in the improtance of increasing her mobility and endurance  Pt  seated at EOB post session with nursing students present in room and all needs within reach  Pt  is progressing with her mobility however need further progression in ambulation and stair negotiation  Will continue per POC  Pt  was oriented to place and situation  Tremors noted in UEs  needed cueing for hand placement for STS transfers  Barriers to Discharge: Inaccessible home environment,Decreased caregiver support  Barriers to Discharge Comments: zoe? level of supervision at home? PT Discharge Recommendation: Post acute rehabilitation services          See flowsheet documentation for full assessment

## 2022-03-16 NOTE — UTILIZATION REVIEW
Elective Surgical Continued Stay Review    Date:   3/16/22    POD#: 7  Current Patient Class:   Inpatient  Current Level of Care:   Med surg    Assessment/Plan: 60 y o  female, initial surgery date    3/9 /22      R  TKA    [address] consult  ( 3/14)  No IP  pschy treatment  Warranted  3/16    Continue  PT/OT  Needs  STR  On discharge  Continue   Current meds/treatment plan  Pertinent Labs/Diagnostic Results:       Results from last 7 days   Lab Units 03/15/22  0532 03/12/22 0456 03/11/22  0537 03/10/22  0515 03/10/22  0515   WBC Thousand/uL 5 32 7 63 6 71   < > 10 11   HEMOGLOBIN g/dL 9 4* 9 9* 10 1*  --  11 6   HEMATOCRIT % 29 1* 30 2* 29 4*  --  34 4*   PLATELETS Thousands/uL 267 269 236   < > 312   NEUTROS ABS Thousands/µL  --  4 64  --   --   --     < > = values in this interval not displayed           Results from last 7 days   Lab Units 03/16/22  0538 03/15/22  0532 03/12/22 0456 03/11/22 0537 03/10/22  0515   SODIUM mmol/L 144 142 137 138 138   POTASSIUM mmol/L 3 9 3 9 4 0 3 2* 4 0   CHLORIDE mmol/L 106 105 105 104 105   CO2 mmol/L 31 32 25 26 26   ANION GAP mmol/L 7 5 7 8 7   BUN mg/dL 10 6 13 13 7   CREATININE mg/dL 0 77 0 72 0 89 0 95 0 70   EGFR ml/min/1 73sq m 85 92 71 66 95   CALCIUM mg/dL 7 9* 8 1* 8 0* 7 9* 8 0*   MAGNESIUM mg/dL 2 0  --   --   --   --              Results from last 7 days   Lab Units 03/16/22  0538 03/15/22  0532 03/12/22 0456 03/11/22  0537 03/10/22  0515   GLUCOSE RANDOM mg/dL 96 94 102 118 110                                 Vital Signs:   97 5-73-18      137/67      sats  97 % RA    Medications:   Scheduled Medications:  acetaminophen, 975 mg, Oral, Q8H KESHIA  ascorbic acid, 500 mg, Oral, Daily  aspirin, 81 mg, Oral, Daily  atorvastatin, 40 mg, Oral, Daily With Dinner  busPIRone, 20 mg, Oral, TID  enoxaparin, 40 mg, Subcutaneous, Daily  folic acid, 1 mg, Oral, Daily  lithium carbonate, 300 mg, Oral, HS  pantoprazole, 20 mg, Oral, Early Morning  PARoxetine, 60 mg, Oral, Daily  polyethylene glycol, 17 g, Oral, Daily  potassium chloride, 20 mEq, Oral, Daily  prazosin, 2 mg, Oral, HS  pregabalin, 150 mg, Oral, TID  propranolol, 10 mg, Oral, BID before breakfast/lunch  QUEtiapine, 50 mg, Oral, HS  senna, 2 tablet, Oral, HS  Valbenazine Tosylate, 80 mg, Oral, QPM      Continuous IV Infusions:     PRN Meds:  calcium carbonate, 1,000 mg, Oral, Daily PRN  clonazePAM, 0 5 mg, Oral, BID PRN  hydrOXYzine HCL, 50 mg, Oral, TID PRN  oxyCODONE, 10 mg, Oral, Q4H PRN  oxyCODONE, 5 mg, Oral, Q4H PRN  phenol, 1 spray, Mouth/Throat, Q2H PRN          Discharge Plan:   STR    Network Utilization Review Department  ATTENTION: Please call with any questions or concerns to [phone] and carefully listen to the prompts so that you are directed to the right person  All voicemails are confidential   Avelino Fischer all requests for admission clinical reviews, approved or denied determinations and any other requests to dedicated fax number below belonging to the campus where the patient is receiving treatment   List of dedicated fax numbers for the Facilities:  [address] DENIALS (Administrative/Medical Necessity) [phone]   [address] (Maternity/NICU/Pediatrics) [phone]   [address]  [address] [facility] [place] [address] [address] [place] [address] [address] [facility] [place] [address] [phone]

## 2022-03-16 NOTE — PROGRESS NOTES
[address] [facility]  Progress Note - Shanice Reyesose 1965, 60 y o  female MRN: [id number]  Unit/Bed#: E2 -02 Encounter: [id number]  Primary Care Provider: Rose Jamil DO   Date and time admitted to hospital: 3/9/2022  9:14 AM    Constipation  Assessment & Plan  Continue Bowel regimen    History of CVA (cerebrovascular accident)  Assessment & Plan  Continue aspirin and statin    Tardive dyskinesia  Assessment & Plan  Continue Lyrica  Valbenazine was not on formulary  It was sent to the hospital and started today  Repeat ekg showed qtc that was stable and not prolonged  She will have a repeat ekg tomorrow   D/c clonazepam 0 5 mg b i d  P r n  Essential hypertension  Assessment & Plan  Continue inderal and prazosin  BP currently controlled    Generalized anxiety disorder  Assessment & Plan  Continue Paxil and BuSpar      Esophageal reflux  Assessment & Plan  Continue PPI    Bipolar II disorder ([facility] [address] )  Assessment & Plan  Pt may follow up with Dr Bonita Goldberg outpt  Continue lithium, and Seroquel bedtime    Lumbar radiculopathy  Assessment & Plan  · s/p thoracolumbar decompression and fusion in 2018 in [place] with subsequent laminectomy syndrome   · S/p thoracic spinal cord stimulator placement in 7/28/20  · Recommend continue follow-up with Neurosurgery outpatient for further evaluation as patient does not use spinal stimulator at this time      Chronic pain disorder  Assessment & Plan  History of opioid dependence, reporting bilateral knee pain  Caution regarding significant opioids for pain control  Recommend topical agents, Voltaren, diclofenac, Lidoderm patches  Minimize oxycodone as needed to work with physical therapy    * S/P total knee arthroplasty, right  Assessment & Plan  Status post 3/9 right total knee arthroplasty  PT and OT currently recommend rehab  H/H stable  DVTppx with lovenox, will need 30 days  Follow up with ortho in 2 weeks  Appreciate psych evaluation   She will be cleared for discharge tomorrow if ekg is stable  VTE Pharmacologic Prophylaxis: lovenox    Mechanical VTE Prophylaxis in Place: Yes    Education and Discussions with Family / Patient: called her sister and updated her    Time Spent for Care: 20 minutes  More than 50% of total time spent on counseling and coordination of care as described above  Current Length of Stay: 6 day(s)  Current Patient Status: Inpatient     Discharge Plan / Estimated Discharge Date: anticipate d/c to str tomorrow if ekg is stable  Code Status: Level 1 - Full Code      Subjective:   Pt seen and examined  Pt was about to start working with physical therapy  She was able to take valbenazine today  Objective:     Vitals:   Temp (24hrs), Av 8 °F (36 6 °C), Min:97 3 °F (36 3 °C), Max:98 5 °F (36 9 °C)    Temp:  [97 3 °F (36 3 °C)-98 5 °F (36 9 °C)] 98 5 °F (36 9 °C)  HR:  [70-81] 72  Resp:  [18] 18  BP: (113-138)/(67-83) 121/83  SpO2:  [97 %-99 %] 99 %  Body mass index is 34 96 kg/m²  Input and Output Summary (last 24 hours): Intake/Output Summary (Last 24 hours) at 3/16/2022 1601  Last data filed at 3/16/2022 1101  Gross per 24 hour   Intake --   Output 1000 ml   Net -1000 ml       Physical Exam:   Physical Exam  Constitutional:       Appearance: Normal appearance  HENT:      Head: Normocephalic and atraumatic  Eyes:      Extraocular Movements: Extraocular movements intact  Pupils: Pupils are equal, round, and reactive to light  Cardiovascular:      Rate and Rhythm: Normal rate and regular rhythm  Heart sounds: No murmur heard  No friction rub  No gallop  Pulmonary:      Effort: Pulmonary effort is normal  No respiratory distress  Breath sounds: Normal breath sounds  No wheezing or rales  Abdominal:      General: Bowel sounds are normal  There is no distension  Palpations: Abdomen is soft  Tenderness: There is no abdominal tenderness  There is no guarding     Neurological: Mental Status: She is alert and oriented to person, place, and time  Comments: Minimal abnormal movement of mouth/tongue        Additional Data:     Labs:  Results from last 7 days   Lab Units 03/15/22  0532 03/12/22  0456 03/12/22  0456   WBC Thousand/uL 5 32   < > 7 63   HEMOGLOBIN g/dL 9 4*   < > 9 9*   HEMATOCRIT % 29 1*   < > 30 2*   PLATELETS Thousands/uL 267   < > 269   NEUTROS PCT %  --   --  61   LYMPHS PCT %  --   --  22   MONOS PCT %  --   --  12   EOS PCT %  --   --  5    < > = values in this interval not displayed       Results from last 7 days   Lab Units 03/16/22  0538   SODIUM mmol/L 144   POTASSIUM mmol/L 3 9   CHLORIDE mmol/L 106   CO2 mmol/L 31   BUN mg/dL 10   CREATININE mg/dL 0 77   ANION GAP mmol/L 7   CALCIUM mg/dL 7 9*   GLUCOSE RANDOM mg/dL 96                       Recent Cultures (last 7 days):           Lines/Drains:  Invasive Devices  Report    Peripheral Intravenous Line            Peripheral IV 03/15/22 Right Antecubital 1 day              Last 24 Hours Medication List:   Current Facility-Administered Medications   Medication Dose Route Frequency Provider Last Rate    acetaminophen  975 mg Oral [facility] [address] PA-C      ascorbic acid  500 mg Oral Daily [address] PA-C      aspirin  81 mg Oral Daily [address]      atorvastatin  40 mg Oral Daily With [employer] TechnologiesJACQUI      busPIRone  20 mg Oral TID Danielalla JACQUI Del Real      calcium carbonate  1,000 mg Oral Daily PRN Danielalla JACQUI Del Real      enoxaparin  40 mg Subcutaneous Daily [address] PA-C      folic acid  1 mg Oral Daily [address] PA-C      hydrOXYzine HCL  50 mg Oral TID PRN Danielalla JACQUI Del Real      lithium carbonate  300 mg Oral HS [address] PA-C      oxyCODONE  10 mg Oral Q4H PRN Lovella Gt, JACQUI      oxyCODONE  5 mg Oral Q4H PRN Lovella Gt, JACQUI      pantoprazole  20 mg Oral Early Morning [address] PA-C      PARoxetine  60 mg Oral Daily [address] JACQUI Singh      phenol  1 spray Mouth/Throat Q2H PRN Yecenia Valerio PA-C      polyethylene glycol  17 g Oral Daily Alyssa Llanes MD      potassium chloride  20 mEq Oral Daily Ariadna Robles PA-C      prazosin  2 mg Oral HS Ariadna Robles PA-C      pregabalin  150 mg Oral TID Malia Del Real PA-C      propranolol  10 mg Oral BID before breakfast/lunch Yin Guaman DO      QUEtiapine  50 mg Oral HS Malia Del Real PA-C      senna  2 tablet Oral HS Alyssa Llanes MD      Valbenazine Tosylate  80 mg Oral QPM Yin Guaman DO          Today, Patient Was Seen By: Yin Guaman DO

## 2022-03-16 NOTE — PHYSICAL THERAPY NOTE
Physical Therapy Treatment Note     03/16/22 0828   PT Last Visit   PT Visit Date 03/16/22   Note Type   Note Type Treatment   Pain Assessment   Pain Assessment Tool 0-10   Pain Score 8   Pain Location/Orientation Orientation: Right;Location: Knee   Restrictions/Precautions   Weight Bearing Precautions Per Order Yes   RLE Weight Bearing Per Order WBAT   Other Precautions Bed Alarm; Fall Risk;Pain;Cognitive   General   Chart Reviewed Yes   Family/Caregiver Present No   Cognition   Overall Cognitive Status WFL   Subjective   Subjective Pt  sleeping in bed upon entry  Pt  agreeable to PT  Pt  reports "its frustrating that cant do anything"  Bed Mobility   Supine to Sit 5  Supervision   Additional items Assist x 1;Bedrails; Increased time required  (to the R)   Transfers   Sit to Stand   (CGA)   Additional items Assist x 1; Increased time required;Verbal cues   Stand to Sit 5  Supervision   Additional items Assist x 1;Verbal cues; Increased time required   Stand pivot   (CGA)   Additional items Assist x 1; Increased time required;Verbal cues  (with RW)   Ambulation/Elevation   Gait pattern Improper Weight shift; Antalgic; Forward Flexion;Decreased foot clearance; Inconsistent zack; Foward flexed; Ataxia; Short stride; Excessively slow;Decreased heel strike   Gait Assistance   (Min A - CGA)   Additional items Assist x 1;Verbal cues   Assistive Device Rolling walker   Distance 36ft   Balance   Static Sitting Good   Dynamic Sitting Fair +   Static Standing Fair   Dynamic Standing Fair -   Ambulatory Fair -   Endurance Deficit   Endurance Deficit Yes   Activity Tolerance   Activity Tolerance Patient tolerated treatment well   Nurse Made Aware Yes   Exercises   TKR Supine;Bilateral;AAROM;10 reps  (HS, Quad sets, AP)   Assessment   Prognosis Good   Problem List Decreased strength;Decreased range of motion;Decreased endurance;Decreased mobility; Decreased coordination;Decreased cognition; Impaired judgement;Pain;Orthopedic restrictions   Assessment Pt  needed no physical assist for supine to sit transfer to the R but used bedrails for support  pt  reported terrible pain when asked however no facial grimacing or discomfort noted except for STS transfer  Slight antalgic gait noted with RW and CGA  No 2nd A for chair follow for ambulation given this session  Pt  reported I am doing too much  I should do this tomorrow  Needed encourgaement to participate in ambulating distance of 36ft  Educated patient in the improtance of increasing her mobility and endurance  Pt  seated at EOB post session with nursing students present in room and all needs within reach  Pt  is progressing with her mobility however need further progression in ambulation and stair negotiation  Will continue per POC  Pt  was oriented to place and situation  Tremors noted in UEs  needed cueing for hand placement for STS transfers  Barriers to Discharge Inaccessible home environment;Decreased caregiver support   Goals   Patient Goals None reported   STG Expiration Date 03/19/22   PT Treatment Day 14   Plan   Treatment/Interventions Functional transfer training;LE strengthening/ROM; Therapeutic exercise;Patient/family training;Cognitive reorientation; Bed mobility;Gait training;Equipment eval/education;Spoke to nursing   Progress Progressing toward goals   PT Frequency Twice a day   Recommendation   PT Discharge Recommendation Post acute rehabilitation services   Equipment Recommended [address] Basic Mobility Inpatient   Turning in Bed Without Bedrails 3   Lying on Back to Sitting on Edge of Flat Bed 3   Moving Bed to Chair 3   Standing Up From Chair 3   Walk in Room 3   Climb 3-5 Stairs 2   Basic Mobility Inpatient Raw Score 17   Basic Mobility Standardized Score 39 67   Highest Level Of Mobility   [facility] Goal 5: Stand one or more mins         Chris Vera PTA    An AM-PAC basic mobility standardized score less than 42 9 suggest the patient may benefit from discharge to post-acute rehab services

## 2022-03-16 NOTE — CASE MANAGEMENT
Case Management Discharge Planning Note    Patient name Samantha Tripathi  Location [address] [place] [address] /E2 -* MRN [id number]  : 1965 Date 3/16/2022       Current Admission Date: 3/9/2022  Current Admission Diagnosis:S/P total knee arthroplasty, right   Patient Active Problem List    Diagnosis Date Noted    Constipation 03/15/2022    S/P total knee arthroplasty, right 03/10/2022    CVA (cerebral vascular accident) ([facility] [address] ) 2022    Preoperative evaluation to rule out surgical contraindication 2022    Leukopenia 2021    Mixed hyperlipidemia 2021    Medical clearance for psychiatric admission 2021    History of CVA (cerebrovascular accident) 2021    Encephalopathy 2021    Nausea 2021    Multiple closed fractures of metatarsal bone of right foot 2021    Chronic back pain 2021    Arthritis of right knee 10/06/2020    Dysphagia 2020    Ambulatory dysfunction 2020    Status post insertion of spinal cord stimulator 2020    Superficial bacterial infection of skin 2020    Scar of back 2020    Impaired skin integrity associated with surgical incision 2020    Rash 2020    Primary osteoarthritis of both knees 2020    Patellofemoral disorder of both knees 2020    Tardive dyskinesia 2020    MIMI (obstructive sleep apnea)     Complaint related to dreams 2020    Family history of colorectal cancer 2019    Encounter for long-term use of opiate analgesic 2019    Post laminectomy syndrome 10/07/2019    Lumbar disc disease with radiculopathy 2018    Spinal stenosis of lumbar region with neurogenic claudication 2018    Lumbar radiculopathy 2017    Bilateral hip pain 2017    Primary localized osteoarthritis of both knees 2017    Chronic pain disorder 2017    Opioid dependence ([facility] [address] ) 2017    Sacroiliitis ([facility] [address] ) 2017  Lumbar spondylosis 10/31/2016    Osteoarthritis of both hips 10/31/2016    Generalized anxiety disorder 10/26/2016    Major depressive disorder, recurrent episode, moderate degree ([facility] [address] ) 09/08/2016    Right knee pain 06/06/2016    Recent unexplained weight loss 06/06/2016    Fatigue 10/26/2015    Bipolar II disorder ([facility] [address] ) 06/18/2015    Panic disorder without agoraphobia 06/18/2015    Post traumatic stress disorder 06/18/2015    Left hip pain 07/25/2014    Intractable low back pain 06/16/2014    Tremor 06/12/2014    Migraine 05/27/2014    Esophageal reflux 05/01/2014    Cognitive disorder 04/18/2014    Female pelvic pain 10/30/2013    Pain in joint, shoulder region 10/28/2013    Overactive bladder 09/26/2013    Osteoarthritis of knee 02/20/2013    Insomnia 01/31/2013    History of hypokalemia 01/03/2013    Urinary incontinence 09/24/2012    Vitamin D deficiency 09/18/2012    Fibromyalgia 09/14/2012    Essential hypertension 09/14/2012      LOS (days): 6  Geometric Mean LOS (GMLOS) (days): 1 80  Days to GMLOS:-4 1     OBJECTIVE:  Risk of Unplanned Readmission Score: 41         Current admission status: Inpatient   Preferred Pharmacy:   [address] [place] [address]  [address] [address]  Phone: [phone] Fax: [phone]    [id number] [address] [place], [place] [id number]  Phone: [phone] Fax: [phone]    Primary Care Provider: Callum Hirsch DO    Primary Insurance: Watt Castleman MEDICARE [facility] REP  Secondary Insurance: [place] Number: pending ref# [id number] Katerin Davidson started through [facility] for [facility]   NPI [id number] Dr Jalil Bhardwaj NPI: [id number] clincals faxed to [phone]

## 2022-03-16 NOTE — PHYSICAL THERAPY NOTE
Physical Therapy Treatment Note     03/16/22 1535   PT Last Visit   PT Visit Date 03/16/22   Note Type   Note Type BID visit/treatment   Pain Assessment   Pain Assessment Tool 0-10   Pain Score 10 - Worst Possible Pain   Pain Location/Orientation Orientation: Right;Location: Knee   Restrictions/Precautions   Weight Bearing Precautions Per Order Yes   RLE Weight Bearing Per Order WBAT   Other Precautions Fall Risk;Pain;Bed Alarm;WBS;Cognitive   General   Chart Reviewed Yes   Family/Caregiver Present No   Subjective   Subjective Pt  agreeable to PT  Pt  in bed supine upon entry  Reported "When i stand its not good  "   Bed Mobility   Supine to Sit 5  Supervision   Additional items Assist x 1;Bedrails; Increased time required   Sit to Supine 5  Supervision   Additional items Assist x 1;Bedrails; Increased time required   Transfers   Sit to Stand 4  Minimal assistance   Additional items Assist x 1; Increased time required;Verbal cues   Stand to Sit 5  Supervision   Additional items Assist x 1; Armrests; Increased time required   Stand pivot 4  Minimal assistance   Additional items Assist x 1; Increased time required;Verbal cues   Toilet transfer 4  Minimal assistance   Additional items Assist x 1; Increased time required;Commode   Ambulation/Elevation   Gait pattern Improper Weight shift;Decreased R stance;Decreased foot clearance; Forward Flexion; Excessively slow; Short stride;Decreased heel strike;Decreased toe off; Antalgic   Gait Assistance   (CGA)   Additional items Assist x 1;Verbal cues   Assistive Device Rolling walker   Distance 28ft   Balance   Static Sitting Good   Dynamic Sitting Fair +   Static Standing Fair   Dynamic Standing Fair -   Ambulatory Fair -   Endurance Deficit   Endurance Deficit Yes   Endurance Deficit Description Pain   Activity Tolerance   Activity Tolerance Patient tolerated treatment well;Patient limited by pain   Nurse Made Aware yes   Assessment   Prognosis Good   Problem List Decreased strength;Decreased range of motion;Decreased endurance; Impaired balance;Decreased mobility; Impaired judgement;Pain;Orthopedic restrictions; Obesity; Decreased coordination   Assessment Pt  agreeable only to ambulation for PT session  Noted increased difficulty with STS transfers this session  Cues for hand placement givewn 100% of time  pt  had a posterior LOB and impulsicve descend to EOB  Pt  able to perform supine to sit and back transfer with S  Pt  was limited in her [address] activities this session  Pt  able to perform pericare in standing with CGA  Will continue per POC  Barriers to Discharge Inaccessible home environment;Decreased caregiver support   Goals   Patient Goals None reported   STG Expiration Date 03/19/22   PT Treatment Day 15   Plan   Treatment/Interventions Functional transfer training;Patient/family training;Equipment eval/education;Gait training;Bed mobility;Spoke to nursing   Progress Progressing toward goals   PT Frequency Twice a day   Recommendation   PT Discharge Recommendation Post acute rehabilitation services   AM-PAC Basic Mobility Inpatient   Turning in Bed Without Bedrails 3   Lying on Back to Sitting on Edge of Flat Bed 3   Moving Bed to Chair 3   Standing Up From Chair 3   Walk in Room 3   Climb 3-5 Stairs 2   Basic Mobility Inpatient Raw Score 17   Basic Mobility Standardized Score 39 67   Highest Level Of Mobility   JH-HLM Goal 5: Stand one or more mins   JH-HLM Highest Level of Mobility 6: Walk 10 steps or more   JH-HLM Goal Achieved Yes   End of Consult   Patient Position at End of Consult Supine;Bed/Chair alarm activated; All needs within reach         Kayla Weathers PTA    An AM-PAC basic mobility standardized score less than 42 9 suggest the patient may benefit from discharge to post-acute rehab services

## 2022-03-17 ENCOUNTER — APPOINTMENT (OUTPATIENT)
Dept: PHYSICAL THERAPY | Facility: CLINIC | Age: 57
End: 2022-03-17
Payer: MEDICARE

## 2022-03-17 VITALS
OXYGEN SATURATION: 95 % | SYSTOLIC BLOOD PRESSURE: 119 MMHG | TEMPERATURE: 96.7 F | HEIGHT: 60 IN | DIASTOLIC BLOOD PRESSURE: 88 MMHG | BODY MASS INDEX: 35.14 KG/M2 | WEIGHT: 179 LBS | RESPIRATION RATE: 18 BRPM | HEART RATE: 71 BPM

## 2022-03-17 LAB
ATRIAL RATE: 78 BPM
ATRIAL RATE: 80 BPM
FLUAV RNA RESP QL NAA+PROBE: NEGATIVE
FLUBV RNA RESP QL NAA+PROBE: NEGATIVE
P AXIS: 55 DEGREES
P AXIS: 63 DEGREES
PR INTERVAL: 140 MS
PR INTERVAL: 148 MS
QRS AXIS: 51 DEGREES
QRS AXIS: 53 DEGREES
QRSD INTERVAL: 104 MS
QRSD INTERVAL: 98 MS
QT INTERVAL: 390 MS
QT INTERVAL: 402 MS
QTC INTERVAL: 449 MS
QTC INTERVAL: 458 MS
RSV RNA RESP QL NAA+PROBE: NEGATIVE
SARS-COV-2 RNA RESP QL NAA+PROBE: NEGATIVE
T WAVE AXIS: 16 DEGREES
T WAVE AXIS: 21 DEGREES
VENTRICULAR RATE: 78 BPM
VENTRICULAR RATE: 80 BPM

## 2022-03-17 PROCEDURE — 93005 ELECTROCARDIOGRAM TRACING: CPT

## 2022-03-17 PROCEDURE — 93010 ELECTROCARDIOGRAM REPORT: CPT | Performed by: INTERNAL MEDICINE

## 2022-03-17 PROCEDURE — 97110 THERAPEUTIC EXERCISES: CPT

## 2022-03-17 PROCEDURE — 0241U HB NFCT DS VIR RESP RNA 4 TRGT: CPT | Performed by: INTERNAL MEDICINE

## 2022-03-17 PROCEDURE — 99239 HOSP IP/OBS DSCHRG MGMT >30: CPT | Performed by: INTERNAL MEDICINE

## 2022-03-17 PROCEDURE — 99232 SBSQ HOSP IP/OBS MODERATE 35: CPT | Performed by: INTERNAL MEDICINE

## 2022-03-17 RX ORDER — CALCIUM CARBONATE 200(500)MG
1000 TABLET,CHEWABLE ORAL DAILY PRN
Refills: 0 | Status: ON HOLD
Start: 2022-03-17 | End: 2022-03-25

## 2022-03-17 RX ORDER — OXYCODONE HYDROCHLORIDE 10 MG/1
10 TABLET ORAL EVERY 4 HOURS PRN
Refills: 0 | Status: ON HOLD
Start: 2022-03-17 | End: 2022-03-21

## 2022-03-17 RX ORDER — ACETAMINOPHEN 325 MG/1
975 TABLET ORAL EVERY 8 HOURS SCHEDULED
Refills: 0 | Status: ON HOLD
Start: 2022-03-17 | End: 2022-03-25

## 2022-03-17 RX ORDER — LORAZEPAM 0.5 MG/1
0.5 TABLET ORAL 2 TIMES DAILY PRN
Status: DISCONTINUED | OUTPATIENT
Start: 2022-03-17 | End: 2022-03-17 | Stop reason: HOSPADM

## 2022-03-17 RX ORDER — LORAZEPAM 0.5 MG/1
0.5 TABLET ORAL 2 TIMES DAILY PRN
Qty: 10 TABLET | Refills: 0 | Status: SHIPPED | OUTPATIENT
Start: 2022-03-17 | End: 2022-07-05

## 2022-03-17 RX ORDER — OXYCODONE HYDROCHLORIDE 5 MG/1
5 TABLET ORAL EVERY 4 HOURS PRN
Qty: 20 TABLET | Refills: 0 | Status: ON HOLD | OUTPATIENT
Start: 2022-03-17 | End: 2022-03-25

## 2022-03-17 RX ORDER — PROPRANOLOL HYDROCHLORIDE 10 MG/1
10 TABLET ORAL
Refills: 0
Start: 2022-03-17

## 2022-03-17 RX ORDER — POLYETHYLENE GLYCOL 3350 17 G/17G
17 POWDER, FOR SOLUTION ORAL DAILY
Refills: 0 | Status: ON HOLD
Start: 2022-03-18 | End: 2022-03-21

## 2022-03-17 RX ORDER — SENNOSIDES 8.6 MG
17.2 TABLET ORAL
Refills: 0 | Status: ON HOLD
Start: 2022-03-17 | End: 2022-03-25

## 2022-03-17 RX ADMIN — PREGABALIN 150 MG: 75 CAPSULE ORAL at 08:13

## 2022-03-17 RX ADMIN — OXYCODONE HYDROCHLORIDE AND ACETAMINOPHEN 500 MG: 500 TABLET ORAL at 08:13

## 2022-03-17 RX ADMIN — POTASSIUM CHLORIDE 20 MEQ: 1500 TABLET, EXTENDED RELEASE ORAL at 08:12

## 2022-03-17 RX ADMIN — ASPIRIN 81 MG CHEWABLE TABLET 81 MG: 81 TABLET CHEWABLE at 08:13

## 2022-03-17 RX ADMIN — BUSPIRONE HYDROCHLORIDE 20 MG: 10 TABLET ORAL at 08:13

## 2022-03-17 RX ADMIN — ATORVASTATIN CALCIUM 40 MG: 40 TABLET, FILM COATED ORAL at 16:31

## 2022-03-17 RX ADMIN — VALBENAZINE 80 MG: 80 CAPSULE ORAL at 16:01

## 2022-03-17 RX ADMIN — ENOXAPARIN SODIUM 40 MG: 40 INJECTION SUBCUTANEOUS at 02:38

## 2022-03-17 RX ADMIN — POLYETHYLENE GLYCOL 3350 17 G: 17 POWDER, FOR SOLUTION ORAL at 08:12

## 2022-03-17 RX ADMIN — PREGABALIN 150 MG: 75 CAPSULE ORAL at 16:31

## 2022-03-17 RX ADMIN — FOLIC ACID 1 MG: 1 TABLET ORAL at 08:13

## 2022-03-17 RX ADMIN — ACETAMINOPHEN 325MG 975 MG: 325 TABLET ORAL at 13:43

## 2022-03-17 RX ADMIN — PANTOPRAZOLE SODIUM 20 MG: 20 TABLET, DELAYED RELEASE ORAL at 05:30

## 2022-03-17 RX ADMIN — BUSPIRONE HYDROCHLORIDE 20 MG: 10 TABLET ORAL at 16:31

## 2022-03-17 RX ADMIN — ACETAMINOPHEN 325MG 975 MG: 325 TABLET ORAL at 05:30

## 2022-03-17 RX ADMIN — OXYCODONE HYDROCHLORIDE 10 MG: 10 TABLET ORAL at 05:30

## 2022-03-17 RX ADMIN — PAROXETINE 60 MG: 20 TABLET, FILM COATED ORAL at 08:13

## 2022-03-17 RX ADMIN — OXYCODONE HYDROCHLORIDE 10 MG: 10 TABLET ORAL at 16:30

## 2022-03-17 NOTE — DISCHARGE SUMMARY
Discharge Summary - [facility] Internal Medicine    Patient Information: Kaya Gustafson 60 y o  female MRN: [id number]  Unit/Bed#: E2 -02 Encounter: [id number]    Discharging Physician / Practitioner: Yin Guaman DO  PCP: Elis Mcguire DO  Admission Date: 3/9/2022  Discharge Date: 03/17/22    Disposition:     Discharge to [facility]    Reason for Admission: s/p right TKA    Discharge Diagnoses:     Principal Problem:    S/P total knee arthroplasty, right  Active Problems:    Chronic pain disorder    Lumbar radiculopathy    Bipolar II disorder (HCC)    Esophageal reflux    Generalized anxiety disorder    Essential hypertension    Tardive dyskinesia    History of CVA (cerebrovascular accident)    Constipation  Resolved Problems:    * No resolved hospital problems  *      Consultations During Hospital Stay:  · ortho    Procedures Performed:     · S/p right TKA    Significant Findings / Test Results:   none      Incidental Findings:   · none    Test Results Pending at Discharge (will require follow up):   · none     Outpatient Tests Requested:  none    Hospital Course:     Samantha Leon is a 60 y o  female patient who originally presented to the hospital on 3/9/2022 due to s/p right tka for oa  She will follow up with dr Kike Singh and continue with lovenox for 30 days  She was discharged to str   Pt was started on velbenazine for tardive dyskinesia  She will follow up with psych outpt   Her sbp was lower in the hospital in which norvasc was d/lidia  Her propanolol was decreased to 10mg daily   Pt was discharged to [facility]  Discharge Day Visit / Exam:     * Please refer to separate progress note for these details *    Discharge instructions/Information to patient and family:   See after visit summary for information provided to patient and family  Provisions for Follow-Up Care:  See after visit summary for information related to follow-up care and any pertinent home health orders        Discharge Statement:  I spent 32 minutes discharging the patient  This time was spent on the day of discharge  I had direct contact with the patient on the day of discharge  Greater than 50% of the total time was spent examining patient, answering all patient questions, arranging and discussing plan of care with patient as well as directly providing post-discharge instructions  Additional time then spent on discharge activities  Discharge Medications:  See after visit summary for reconciled discharge medications provided to patient and family

## 2022-03-17 NOTE — PLAN OF CARE
Problem: PHYSICAL THERAPY ADULT  Goal: Performs mobility at highest level of function for planned discharge setting  See evaluation for individualized goals  Description: Treatment/Interventions: Functional transfer training,LE strengthening/ROM,Elevations,Therapeutic exercise,Cognitive reorientation,Patient/family training,Equipment eval/education,Bed mobility,Gait training,Compensatory technique education,Continued evaluation,Spoke to nursing          See flowsheet documentation for full assessment, interventions and recommendations  Outcome: Progressing  Note: Prognosis: Good  Problem List: Decreased strength,Decreased range of motion,Impaired balance,Decreased mobility,Decreased coordination,Impaired judgement,Pain,Orthopedic restrictions  Assessment: Pt  agreeable only to LE Reyna this session  Reported "I will walk later " Pt  able to scoot herself laterally to the R with bedrail assist  Pt  needed tactile cues for quad sets and APs  pt  needed physical assist for all other Reyna  Pt  noted to self limit her activities hence needs encouragement to participate in activities actively  Will continue per PT POC  Barriers to Discharge: Inaccessible home environment,Decreased caregiver support  Barriers to Discharge Comments: zoe? level of supervision at home? PT Discharge Recommendation: Post acute rehabilitation services          See flowsheet documentation for full assessment

## 2022-03-17 NOTE — CASE MANAGEMENT
Case Management Discharge Planning Note    Patient name Samantha Manrique  Location [facility] /E2 -* MRN [id number]  : 1965 Date 3/17/2022       Current Admission Date: 3/9/2022  Current Admission Diagnosis:S/P total knee arthroplasty, right   Patient Active Problem List    Diagnosis Date Noted    Constipation 03/15/2022    S/P total knee arthroplasty, right 03/10/2022    CVA [facility]cerebral vascular accident) ([facility] [address] ) 2022    Preoperative evaluation to rule out surgical contraindication 2022    Leukopenia 2021    Mixed hyperlipidemia 2021    Medical clearance for psychiatric admission 2021    History of CVA (cerebrovascular accident) 2021    Encephalopathy 2021    Nausea 2021    Multiple closed fractures of metatarsal bone of right foot 2021    Chronic back pain 2021    Arthritis of right knee 10/06/2020    Dysphagia 2020    Ambulatory dysfunction 2020    Status post insertion of spinal cord stimulator 2020    Superficial bacterial infection of skin 2020    Scar of back 2020    Impaired skin integrity associated with surgical incision 2020    Rash 2020    Primary osteoarthritis of both knees 2020    Patellofemoral disorder of both knees 2020    Tardive dyskinesia 2020    MIMI (obstructive sleep apnea)     Complaint related to dreams 2020    Family history of colorectal cancer 2019    Encounter for long-term use of opiate analgesic 2019    Post laminectomy syndrome 10/07/2019    Lumbar disc disease with radiculopathy 2018    Spinal stenosis of lumbar region with neurogenic claudication 2018    Lumbar radiculopathy 2017    Bilateral hip pain 2017    Primary localized osteoarthritis of both knees 2017    Chronic pain disorder 2017    Opioid dependence ([facility] [address] ) 2017    Sacroiliitis ([facility] [address] ) 2017  Lumbar spondylosis 10/31/2016    Osteoarthritis of both hips 10/31/2016    Generalized anxiety disorder 10/26/2016    Major depressive disorder, recurrent episode, moderate degree ([facility] [address] ) 09/08/2016    Right knee pain 06/06/2016    Recent unexplained weight loss 06/06/2016    Fatigue 10/26/2015    Bipolar II disorder ([facility] [address] ) 06/18/2015    Panic disorder without agoraphobia 06/18/2015    Post traumatic stress disorder 06/18/2015    Left hip pain 07/25/2014    Intractable low back pain 06/16/2014    Tremor 06/12/2014    Migraine 05/27/2014    Esophageal reflux 05/01/2014    Cognitive disorder 04/18/2014    Female pelvic pain 10/30/2013    Pain in joint, shoulder region 10/28/2013    Overactive bladder 09/26/2013    Osteoarthritis of knee 02/20/2013    Insomnia 01/31/2013    History of hypokalemia 01/03/2013    Urinary incontinence 09/24/2012    Vitamin D deficiency 09/18/2012    Fibromyalgia 09/14/2012    Essential hypertension 09/14/2012      LOS (days): 7  Geometric Mean LOS (GMLOS) (days): 1 80  Days to GMLOS:-5 1     OBJECTIVE:  Risk of Unplanned Readmission Score: 44         Current admission status: Inpatient   Preferred Pharmacy:   [address] [place] [address]  Phone: [phone] Fax: [phone]    [id number] [place] [address]  Phone: [phone] Fax: [phone]    Primary Care Provider: Gabriel Alicia DO    Primary Insurance: [address] [place], [place] [id number] [facility] REP  Secondary Insurance: Emmanuelle Number: Approved auth # [id number] date 3/16/22NRD 3/21/22Reviewer: Katja Perea [phone]

## 2022-03-17 NOTE — CASE MANAGEMENT
Case Management Discharge Planning Note    Patient name Samantha Yanez  Location [facility] /E2 -* MRN [id number]  : 1965 Date 3/17/2022       Current Admission Date: 3/9/2022  Current Admission Diagnosis:S/P total knee arthroplasty, right   Patient Active Problem List    Diagnosis Date Noted    Constipation 03/15/2022    S/P total knee arthroplasty, right 03/10/2022    CVA [facility]cerebral vascular accident) ([facility] [address] ) 2022    Preoperative evaluation to rule out surgical contraindication 2022    Leukopenia 2021    Mixed hyperlipidemia 2021    Medical clearance for psychiatric admission 2021    History of CVA (cerebrovascular accident) 2021    Encephalopathy 2021    Nausea 2021    Multiple closed fractures of metatarsal bone of right foot 2021    Chronic back pain 2021    Arthritis of right knee 10/06/2020    Dysphagia 2020    Ambulatory dysfunction 2020    Status post insertion of spinal cord stimulator 2020    Superficial bacterial infection of skin 2020    Scar of back 2020    Impaired skin integrity associated with surgical incision 2020    Rash 2020    Primary osteoarthritis of both knees 2020    Patellofemoral disorder of both knees 2020    Tardive dyskinesia 2020    MIMI (obstructive sleep apnea)     Complaint related to dreams 2020    Family history of colorectal cancer 2019    Encounter for long-term use of opiate analgesic 2019    Post laminectomy syndrome 10/07/2019    Lumbar disc disease with radiculopathy 2018    Spinal stenosis of lumbar region with neurogenic claudication 2018    Lumbar radiculopathy 2017    Bilateral hip pain 2017    Primary localized osteoarthritis of both knees 2017    Chronic pain disorder 2017    Opioid dependence ([facility] [address] ) 2017    Sacroiliitis ([facility] [address] ) 2017  Lumbar spondylosis 10/31/2016    Osteoarthritis of both hips 10/31/2016    Generalized anxiety disorder 10/26/2016    Major depressive disorder, recurrent episode, moderate degree ([facility] [address] ) 09/08/2016    Right knee pain 06/06/2016    Recent unexplained weight loss 06/06/2016    Fatigue 10/26/2015    Bipolar II disorder ([facility] [address] ) 06/18/2015    Panic disorder without agoraphobia 06/18/2015    Post traumatic stress disorder 06/18/2015    Left hip pain 07/25/2014    Intractable low back pain 06/16/2014    Tremor 06/12/2014    Migraine 05/27/2014    Esophageal reflux 05/01/2014    Cognitive disorder 04/18/2014    Female pelvic pain 10/30/2013    Pain in joint, shoulder region 10/28/2013    Overactive bladder 09/26/2013    Osteoarthritis of knee 02/20/2013    Insomnia 01/31/2013    History of hypokalemia 01/03/2013    Urinary incontinence 09/24/2012    Vitamin D deficiency 09/18/2012    Fibromyalgia 09/14/2012    Essential hypertension 09/14/2012      LOS (days): 7  Geometric Mean LOS (GMLOS) (days): 1 80  Days to GMLOS:-5 1     OBJECTIVE:  Risk of Unplanned Readmission Score: 44      Current admission status: Inpatient   Preferred Pharmacy:   [address] [place] [address]  [address] [place] [address]  Phone: [phone] Fax: [place] [address], [address]  Phone: [phone] Fax: [phone]    Primary Care Provider: Michelle Mcgrath DO    Primary Insurance: [address] [place], [place] [address]Th [facility]  Secondary Insurance: Michael Damon    DISCHARGE DETAILS:    Discharge planning discussed with[de-identified] DaughterMelani of Choice: Yes  Comments - Freedom of Choice: CM called pt's dtr to make her to make her aware of discharge for today  She is very excited for her mom to be moving  She already spoke with the [facility] and had her questions answered  Magen Castellanos CM has a wcv arranged for 5PM  Rn and family aware  CM will cotninue to follow as needed    CM contacted family/caregiver?: Yes  Were Treatment Team discharge recommendations reviewed with patient/caregiver?: Yes     Contacts  Patient Contacts: Cintia Mellisa  Relationship to Patient[de-identified] Family  Contact Method: Phone  Phone Number: [phone]  Reason/Outcome: Discharge Planning    Transport at Discharge : [address] of Transport (Date): 03/17/22  ETA of Transport (Time): 1700     IMM Given (Date):: 03/17/22  IMM Given to[de-identified] Srinivasan [address] : [facility]  Receiving Facility/Agency Phone Number: [phone]  Facility/Agency Fax Number: [phone]

## 2022-03-17 NOTE — PHYSICAL THERAPY NOTE
Physical Therapy Treatment Note     03/17/22 1017   PT Last Visit   PT Visit Date 03/17/22   Note Type   Note Type BID visit/treatment   Pain Assessment   Pain Assessment Tool 0-10   Pain Score 8   Pain Location/Orientation Orientation: Right;Location: Knee   Restrictions/Precautions   Weight Bearing Precautions Per Order Yes   RLE Weight Bearing Per Order WBAT   Other Precautions WBS; Fall Risk;Pain   General   Chart Reviewed Yes   Subjective   Subjective Pt  in bed upon entry  Reported she does not want to walk  Agreeable to LE Reyna   Endurance Deficit   Endurance Deficit Yes   Endurance Deficit Description Pain   Activity Tolerance   Activity Tolerance Patient tolerated treatment well   Nurse Made Aware Yes   Exercises   Quad Sets Supine;Bilateral;20 reps;AROM  (with tactile cues)   Heelslides Supine;Bilateral;AAROM;20 reps   Hip Flexion Supine;Bilateral;AAROM;20 reps   Hip Abduction Supine;Bilateral;AAROM;20 reps   Hip Adduction Supine;AROM; Bilateral;20 reps   Knee AROM Short Arc Quad Supine;Bilateral;AROM;20 reps   Ankle Pumps Supine;Bilateral;AROM;20 reps  (tactile cues)   Assessment   Prognosis Good   Problem List Decreased strength;Decreased range of motion; Impaired balance;Decreased mobility; Decreased coordination; Impaired judgement;Pain;Orthopedic restrictions   Assessment Pt  agreeable only to LE Reyna this session  Reported "I will walk later " Pt  able to scoot herself laterally to the R with bedrail assist  Pt  needed tactile cues for quad sets and APs  pt  needed physical assist for all other Reyna  Pt  noted to self limit her activities hence needs encouragement to participate in activities actively  Will continue per PT POC   Barriers to Discharge Inaccessible home environment;Decreased caregiver support   Goals   Patient Goals None reported   STG Expiration Date 03/19/22   PT Treatment Day 16   Plan   Treatment/Interventions LE strengthening/ROM; Therapeutic exercise; Bed mobility; Patient/family training;Spoke to nursing   Progress Progressing toward goals   PT Frequency Twice a day   Recommendation   PT Discharge Recommendation Post acute rehabilitation services   Equipment Recommended [address] Basic Mobility Inpatient   Turning in Bed Without Bedrails 3   Lying on Back to Sitting on Edge of Flat Bed 3   Moving Bed to Chair 3   Standing Up From Chair 3   Walk in Room 3   Climb 3-5 Stairs 2   Basic Mobility Inpatient Raw Score 17   Basic Mobility Standardized Score 39 67   Highest Level Of Mobility   JH-HL Goal 5: Stand one or more mins   JH-HLM Highest Level of Mobility 6: Walk 10 steps or more   JH-HLM Goal Achieved Yes   End of Consult   Patient Position at End of Consult All needs within reach; Supine;Bed/Chair alarm activated         Sarita Meyer PTA    An AM-PAC basic mobility standardized score less than 42 9 suggest the patient may benefit from discharge to post-acute rehab services

## 2022-03-17 NOTE — PROGRESS NOTES
[address] [facility]  Progress Note - Rahel Chapman 1965, 60 y o  female MRN: [id number]  Unit/Bed#: E2 -02 Encounter: [id number]  Primary Care Provider: Citlaly Reyes DO   Date and time admitted to hospital: 3/9/2022  9:14 AM    Constipation  Assessment & Plan  Continue Bowel regimen    History of CVA (cerebrovascular accident)  Assessment & Plan  Continue aspirin and statin    Tardive dyskinesia  Assessment & Plan  Continue Lyrica  Valbenazine was not on formulary  It was sent to the hospital and started today  Repeat ekg shows that qtc was stable  May continue valbenazine  Will continue ativan 0 5mg bid prn      Essential hypertension  Assessment & Plan  Continue inderal and prazosin  BP currently controlled    Generalized anxiety disorder  Assessment & Plan  Continue Paxil, buspar, and ativan prn      Esophageal reflux  Assessment & Plan  Continue PPI    Bipolar II disorder ([facility] [address] )  Assessment & Plan  Pt may follow up with Dr Trae Lugo outpt  Continue lithium, and Seroquel bedtime    Lumbar radiculopathy  Assessment & Plan  · s/p thoracolumbar decompression and fusion in 2018 in [place] with subsequent laminectomy syndrome   · S/p thoracic spinal cord stimulator placement in 7/28/20  · Recommend continue follow-up with Neurosurgery outpatient for further evaluation as patient does not use spinal stimulator at this time      Chronic pain disorder  Assessment & Plan  History of opioid dependence, reporting bilateral knee pain  Caution regarding significant opioids for pain control  Recommend topical agents, Voltaren, diclofenac, Lidoderm patches  Minimize oxycodone as needed to work with physical therapy    * S/P total knee arthroplasty, right  Assessment & Plan  Status post 3/9 right total knee arthroplasty  PT and OT currently recommend rehab  H/H stable  DVTppx with lovenox, will need 30 days  Follow up with ortho in 2 weeks  Appreciate psych evaluation   She will be cleared for discharge tomorrow if ekg is stable  VTE Pharmacologic Prophylaxis: lovenox    Mechanical VTE Prophylaxis in Place: Yes    Education and Discussions with Family / Patient: updated her daughter yesterday     Time Spent for Care: 20 minutes  More than 50% of total time spent on counseling and coordination of care as described above  Current Length of Stay: 7 day(s)  Current Patient Status: Inpatient     Discharge Plan / Estimated Discharge Date: anticipate d/c to str today     Code Status: Level 1 - Full Code      Subjective:   Pt seen and examined  Pt states she is on ativan outpt  Did review the chart and she received 120pills of ativan of 0 5mg on 3/8  No f/c no cp no sob no n/v/d no abd pain  She is tolerating the valbenazine    Objective:     Vitals:   Temp (24hrs), Av 9 °F (36 6 °C), Min:96 7 °F (35 9 °C), Max:98 6 °F (37 °C)    Temp:  [96 7 °F (35 9 °C)-98 6 °F (37 °C)] 96 7 °F (35 9 °C)  HR:  [70-85] 71  Resp:  [14-18] 18  BP: (113-127)/(69-83) 119/81  SpO2:  [95 %-99 %] 95 %  Body mass index is 34 96 kg/m²  Input and Output Summary (last 24 hours): Intake/Output Summary (Last 24 hours) at 3/17/2022 0856  Last data filed at 3/17/2022 0817  Gross per 24 hour   Intake --   Output 1600 ml   Net -1600 ml       Physical Exam:   Physical Exam  Constitutional:       Appearance: Normal appearance  HENT:      Head: Normocephalic and atraumatic  Eyes:      Extraocular Movements: Extraocular movements intact  Pupils: Pupils are equal, round, and reactive to light  Cardiovascular:      Rate and Rhythm: Normal rate and regular rhythm  Heart sounds: No murmur heard  No friction rub  No gallop  Pulmonary:      Effort: Pulmonary effort is normal  No respiratory distress  Breath sounds: Normal breath sounds  No wheezing or rales  Abdominal:      General: Bowel sounds are normal  There is no distension  Palpations: Abdomen is soft  Tenderness:  There is no abdominal tenderness  There is no guarding  Musculoskeletal:      Right lower leg: No edema  Left lower leg: No edema  Neurological:      Mental Status: She is alert and oriented to person, place, and time  Comments: Minimal repetitive movement of tongue and mouth          Additional Data:     Labs:  Results from last 7 days   Lab Units 03/15/22  0532 03/12/22  0456 03/12/22  0456   WBC Thousand/uL 5 32   < > 7 63   HEMOGLOBIN g/dL 9 4*   < > 9 9*   HEMATOCRIT % 29 1*   < > 30 2*   PLATELETS Thousands/uL 267   < > 269   NEUTROS PCT %  --   --  61   LYMPHS PCT %  --   --  22   MONOS PCT %  --   --  12   EOS PCT %  --   --  5    < > = values in this interval not displayed       Results from last 7 days   Lab Units 03/16/22  0538   SODIUM mmol/L 144   POTASSIUM mmol/L 3 9   CHLORIDE mmol/L 106   CO2 mmol/L 31   BUN mg/dL 10   CREATININE mg/dL 0 77   ANION GAP mmol/L 7   CALCIUM mg/dL 7 9*   GLUCOSE RANDOM mg/dL 96                       Recent Cultures (last 7 days):           Lines/Drains:  Invasive Devices  Report    Peripheral Intravenous Line            Peripheral IV 03/15/22 Right Antecubital 2 days              Last 24 Hours Medication List:   Current Facility-Administered Medications   Medication Dose Route Frequency Provider Last Rate    acetaminophen  975 mg Oral [facility] Maryse Robles PA-C      ascorbic acid  500 mg Oral Daily Maryse Robles PA-C      aspirin  81 mg Oral Daily Maryse Robles [place]      atorvastatin  40 mg Oral Daily With saambaa, JACQUI      busPIRone  20 mg Oral TID Juan Diego Necessary, PA-DAMIAN      calcium carbonate  1,000 mg Oral Daily PRN Juan Diego Necessary, JACQUI      enoxaparin  40 mg Subcutaneous Daily Maryse Robles PA-C      folic acid  1 mg Oral Daily Maryse Robles, JACQUI      hydrOXYzine HCL  50 mg Oral TID PRN Juan Diego Necessary, JACQUI      lithium carbonate  300 mg Oral HS Maryse Singh PA-C      LORazepam  0 5 mg Oral BID PRN Ruiz Human,       oxyCODONE  10 mg Oral Q4H PRN Jack Mackay PA-C      oxyCODONE  5 mg Oral Q4H PRN Eva Robles PA-C      pantoprazole  20 mg Oral Early Morning [facility] [place]      PARoxetine  60 mg Oral Daily [place], [place]      phenol  1 spray Mouth/Throat Q2H PRN Arnaud Foy PA-C      polyethylene glycol  17 g Oral Daily Anjali Pastrana MD      potassium chloride  20 mEq Oral Daily [facility] [place]      prazosin  2 mg Oral HS [facility] [place]      pregabalin  150 mg Oral TID Jack Mackay PA-C      propranolol  10 mg Oral BID before breakfast/lunch Yong King DO      QUEtiapine  50 mg Oral HS Jack Mackay PA-C      senna  2 tablet Oral HS Anjali Pastrana MD      Valbenazine Tosylate  80 mg Oral QPM Yong King DO          Today, Patient Was Seen By: Yong King DO

## 2022-03-18 ENCOUNTER — TRANSITIONAL CARE MANAGEMENT (OUTPATIENT)
Dept: INTERNAL MEDICINE CLINIC | Facility: CLINIC | Age: 57
End: 2022-03-18

## 2022-03-18 ENCOUNTER — NURSING HOME VISIT (OUTPATIENT)
Dept: GERIATRICS | Facility: OTHER | Age: 57
End: 2022-03-18
Payer: MEDICARE

## 2022-03-18 VITALS
OXYGEN SATURATION: 97 % | SYSTOLIC BLOOD PRESSURE: 154 MMHG | DIASTOLIC BLOOD PRESSURE: 88 MMHG | HEART RATE: 90 BPM | WEIGHT: 201.3 LBS | TEMPERATURE: 97.5 F | BODY MASS INDEX: 39.31 KG/M2 | RESPIRATION RATE: 19 BRPM

## 2022-03-18 DIAGNOSIS — F31.81 BIPOLAR II DISORDER (HCC): Primary | ICD-10-CM

## 2022-03-18 DIAGNOSIS — G24.01 TARDIVE DYSKINESIA: ICD-10-CM

## 2022-03-18 DIAGNOSIS — K21.9 GASTROESOPHAGEAL REFLUX DISEASE WITHOUT ESOPHAGITIS: ICD-10-CM

## 2022-03-18 DIAGNOSIS — F41.1 GENERALIZED ANXIETY DISORDER: ICD-10-CM

## 2022-03-18 DIAGNOSIS — I10 ESSENTIAL HYPERTENSION: ICD-10-CM

## 2022-03-18 PROCEDURE — 99305 1ST NF CARE MODERATE MDM 35: CPT | Performed by: FAMILY MEDICINE

## 2022-03-18 NOTE — ASSESSMENT & PLAN NOTE
Status post right knee arthroplasty on 03/09  Continue to monitor for pain and manage as needed  Patient taking oxycodone p r n    Continue DVT prophylaxis with Lovenox for total of 30 days  She will need follow-up with orthopedic surgery in 2 weeks  Continue PT OT weight-bearing as tolerated  Monitor CBC CMP CRP

## 2022-03-18 NOTE — PROGRESS NOTES
[facility]    History and Physical  POS: 31    Records Reviewed include: Hospital records      Chief Complaint/ Reason for Admission:   Right knee arthroplasty      History of Present Illness:       Mr Papi Pierce is a 78-year-old female who was recently admitted to [address] [place] [address] for right knee arthroplasty  Currently she is admitted to [address] [place] [address] transitional care unit for short-term rehab  Patient reports pain right knee, aggravated by movement and weight-bearing  Denies fever chills  States that her appetite is fair, denies difficulty sleeping  Denies dizziness lightheadedness  No abdominal pain dysuria hematuria  She reports constipation  Patient denies chest pain shortness of breath cough  Will continue physical therapy and goal is to return home               Allergies  No Known Allergies    Past Medical History  Past Medical History:   Diagnosis Date    Acid reflux     Anxiety     RESOLVED: [id number]    Arthritis     Bipolar 2 disorder (HCC)     FOLLOWS WITH PSYCHIATRIST  CONTINUE LAMOTRIGINE; RESOLVED: [id number]    Depression     Familial tremor     both hands    Fibromyalgia     LAST ASSESSED: [id number]    GERD (gastroesophageal reflux disease)     Hearing aid worn     left ear    [place] (hard of hearing)     left ear    Hyperlipidemia     Hypertension     Left-sided weakness     Lower back pain     Memory loss of unknown cause     long and short term    Migraine     Obesity     Obesity, Class II, BMI 35-39 9     Overactive bladder     Panic attack     Post traumatic stress disorder     Seasonal allergies     Stroke [facility])     questionable stroke 2009    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    CONT ASA 81 MG DAILY; RESOLVED: [id number]    Urinary incontinence     Wears dentures     partial lower / full upper    Wears glasses         Past Surgical History:   Procedure Laterality Date    BACK SURGERY       SECTION 1986    COLONOSCOPY      RESOLVED: [id number]    EAR SURGERY      EGD      HYSTERECTOMY  2004    MYRINGOTOMY W/ TUBES Left     NECK SURGERY  04/2019    HI CYSTOURETHROSCOPY N/A 2/18/2016    Procedure: CYSTOSCOPY, BOTOX INJECTION;  Surgeon: Mulu Patel MD;  Location: [facility];  Service: Gynecology    HI IMPLANT SPINAL NEUROSTIM/ Right 2/10/2021    Procedure: REPLACEMENT IMPLANTABLE PULSE GENERATOR DORSAL SPINAL COLUMN STIMULATOR, RIGHT;  Surgeon: Birtney John MD;  [facility]: [facility];  Service: Neurosurgery    HI PERCUT IMPLNT Delford Elliott Right 7/28/2020    Procedure: INSERTION THORACIC DORSAL COLUMN SPINAL CORD STIMULATOR PERCUTANEOUS W IMPLANTABLE PULSE GENERATOR, RIGHT;  Surgeon: Britney John MD;  [facility];  Service: Neurosurgery    HI TOTAL KNEE ARTHROPLASTY Right 3/9/2022    Procedure: ARTHROPLASTY KNEE TOTAL;  Surgeon: Caitlyn Lerner DO;  [facility];  Service: Orthopedics    TONSILLECTOMY      TUBAL LIGATION  1986    UPPER GASTROINTESTINAL ENDOSCOPY  09/2020       Family History  Family History   Problem Relation Age of Onset    Colon cancer Mother     Alzheimer's disease Father     Stroke Father     Colon cancer Brother     Bipolar disorder Brother     Breast cancer Maternal Aunt     Colon cancer Maternal Aunt     Heart disease Other     Diabetes Other     Hypertension Other     Seizures Son     Depression Paternal Grandfather     No Known Problems Sister     No Known Problems Brother     Thyroid disease Neg Hx        Social History  Social History     Tobacco Use   Smoking Status Never Smoker   Smokeless Tobacco Never Used      Social History     Substance and Sexual Activity   Alcohol Use Not Currently    Comment: 2 x year; being a social drinker as per all scripts       Social History     Substance and Sexual Activity   Drug Use No        Lives: Home,  Social Support: family  Fall in the past 12 months: yes  Use of assistance Device: Walker    Physical Exam    Vital Signs    Vitals:    03/18/22 1429   BP: 154/88   Pulse: 90   Resp: 19   Temp: 97 5 °F (36 4 °C)   SpO2: 97%             Physical Exam  Vitals and nursing note reviewed  Constitutional:       General: She is not in acute distress  Appearance: She is obese  She is not diaphoretic  HENT:      Head: Normocephalic and atraumatic  Ears:      Comments: [place]     Mouth/Throat:      Mouth: Mucous membranes are dry  Eyes:      General:         Right eye: No discharge  Left eye: No discharge  Pupils: Pupils are equal, round, and reactive to light  Cardiovascular:      Rate and Rhythm: Normal rate and regular rhythm  Heart sounds: Normal heart sounds  No murmur heard  Pulmonary:      Effort: Pulmonary effort is normal  No respiratory distress  Breath sounds: Normal breath sounds  No wheezing  Abdominal:      Palpations: Abdomen is soft  Tenderness: There is no abdominal tenderness  There is no guarding or rebound  Musculoskeletal:         General: Tenderness present  Cervical back: Normal range of motion and neck supple  Right lower leg: Edema present  Left lower leg: Edema present  Skin:     General: Skin is warm and dry  Comments: Surgical scar R knee   Neurological:      Mental Status: She is alert and oriented to person, place, and time  Mental status is at baseline  Psychiatric:         Behavior: Behavior normal          Review of Systems:  Review of Systems   Constitutional: Negative for chills and fever  HENT: Negative for congestion and rhinorrhea  Respiratory: Negative for cough, shortness of breath and wheezing  Cardiovascular: Positive for leg swelling  Negative for chest pain and palpitations  Gastrointestinal: Positive for constipation  Negative for abdominal pain  Endocrine: Negative for cold intolerance     Genitourinary: Negative for difficulty urinating, dysuria and hematuria  Musculoskeletal: Positive for gait problem  Skin: Positive for wound  Allergic/Immunologic: Negative for environmental allergies  Neurological: Negative for dizziness and seizures  Hematological: Does not bruise/bleed easily  Psychiatric/Behavioral: Negative for behavioral problems and sleep disturbance  List of Current Medications:    Medication reviewed  All orders signed  Complete list is in the paper chart  Allergies  No Known Allergies    Labs/Diagnostics (reviewed by this provider): I personally reviewed lab results and imaging studies  Full reports are in the paper chart  Assessment/Plan:    S/P total knee arthroplasty, right  Status post right knee arthroplasty on 03/09  Continue to monitor for pain and manage as needed  Patient taking oxycodone p r n  Continue DVT prophylaxis with Lovenox for total of 30 days  She will need follow-up with orthopedic surgery in 2 weeks  Continue PT OT weight-bearing as tolerated  Monitor CBC CMP CRP    Bipolar II disorder (HCC)  Continue lithium and Seroquel  Follow-up with Psychiatry  Continue Valbenazine for tardive dyskinesia    Generalized anxiety disorder  Continue Paxil BuSpar and lorazepam as needed  Follow-up with Psychiatry    Essential hypertension  Blood pressure currently at goal  Continue propranolol and monitor    Tardive dyskinesia  Continue Valbenazine, lyrica, lorazepam    Esophageal reflux  Continue Protonix and monitor for symptoms    Ambulatory dysfunction  Will continue PT/OT  Placed on fall precautions    The goal is to return home       Pain: yes  Rehab Potential:Good  Patient Informed of Medical Condition: yes   Patient is Capable of Understanding Their Right: yes   Discharge Plan: home  Vaccination:   Immunization History   Administered Date(s) Administered    Influenza, seasonal, injectable 10/02/2013, 11/20/2014, 10/26/2015    Tdap 07/25/2014     Advanced Directives: yes: No  Code status:Full Code  PCP: Jason Merchant MD  Geriatric Medicine  3/18/22  11:29 AM

## 2022-03-20 NOTE — ASSESSMENT & PLAN NOTE
Continue lithium and Seroquel  Follow-up with Psychiatry  Continue Valbenazine for tardive dyskinesia

## 2022-03-21 ENCOUNTER — PATIENT OUTREACH (OUTPATIENT)
Dept: INTERNAL MEDICINE CLINIC | Facility: CLINIC | Age: 57
End: 2022-03-21

## 2022-03-21 ENCOUNTER — NURSING HOME VISIT (OUTPATIENT)
Dept: GERIATRICS | Facility: OTHER | Age: 57
End: 2022-03-21
Payer: MEDICARE

## 2022-03-21 ENCOUNTER — APPOINTMENT (OUTPATIENT)
Dept: PHYSICAL THERAPY | Facility: CLINIC | Age: 57
End: 2022-03-21
Payer: MEDICARE

## 2022-03-21 VITALS
SYSTOLIC BLOOD PRESSURE: 137 MMHG | RESPIRATION RATE: 18 BRPM | TEMPERATURE: 97.8 F | OXYGEN SATURATION: 95 % | HEART RATE: 65 BPM | DIASTOLIC BLOOD PRESSURE: 86 MMHG

## 2022-03-21 DIAGNOSIS — R26.2 AMBULATORY DYSFUNCTION: ICD-10-CM

## 2022-03-21 DIAGNOSIS — K21.9 GASTROESOPHAGEAL REFLUX DISEASE WITHOUT ESOPHAGITIS: ICD-10-CM

## 2022-03-21 DIAGNOSIS — F41.1 GENERALIZED ANXIETY DISORDER: ICD-10-CM

## 2022-03-21 DIAGNOSIS — G24.01 TARDIVE DYSKINESIA: ICD-10-CM

## 2022-03-21 DIAGNOSIS — E87.6 HYPOKALEMIA: ICD-10-CM

## 2022-03-21 DIAGNOSIS — Z96.651 S/P TOTAL KNEE ARTHROPLASTY, RIGHT: Primary | ICD-10-CM

## 2022-03-21 DIAGNOSIS — F31.81 BIPOLAR II DISORDER (HCC): ICD-10-CM

## 2022-03-21 DIAGNOSIS — I10 ESSENTIAL HYPERTENSION: ICD-10-CM

## 2022-03-21 PROCEDURE — 99309 SBSQ NF CARE MODERATE MDM 30: CPT | Performed by: NURSE PRACTITIONER

## 2022-03-21 RX ORDER — OXYCODONE HYDROCHLORIDE 5 MG/1
2.5 TABLET ORAL EVERY 4 HOURS PRN
Qty: 20 TABLET | Refills: 0 | Status: SHIPPED | OUTPATIENT
Start: 2022-03-24 | End: 2022-03-25 | Stop reason: SINTOL

## 2022-03-21 RX ORDER — OXYCODONE HYDROCHLORIDE 5 MG/1
2.5 TABLET ORAL EVERY 4 HOURS PRN
Status: ON HOLD | COMMUNITY
Start: 2022-03-24 | End: 2022-03-21 | Stop reason: SDUPTHER

## 2022-03-21 RX ORDER — POLYETHYLENE GLYCOL 3350 17 G/17G
17 POWDER, FOR SOLUTION ORAL DAILY PRN
COMMUNITY
End: 2022-04-05

## 2022-03-21 NOTE — PROGRESS NOTES
Outpatient Care Management Note:    Patient was inpatient at [facility] from 3/9-3/17/22 for s/p right total knee arthroplasty  Patient was discharged to [address] [place] [address] transitional care unit for short term rehab  Per chart review patient's norvasc was discontinued to to low systolic blood pressure  Propanolol was decreased to 10 mg daily  Will plan to outreach once patient is discharged home

## 2022-03-21 NOTE — ASSESSMENT & PLAN NOTE
· S/p new right knee arthroplasty on 03/09/2022  · Continue p r n   Oxycodone for pain management- will continue 5mg through 03/23, and start 2 5mg q4 prn for 1 week  · Continue with Lovenox for DVT prophylaxis for total of 30 days  · Continue PT/OT, patient is weight-bearing as tolerated  · Repeat lab work on Wednesday  · Follow-up with orthopedic surgery as recommended

## 2022-03-21 NOTE — ASSESSMENT & PLAN NOTE
· Was slightly anxious on exam today  · Continue Paxil, BuSpar, and lorazepam p r n  · Discussed patient's medications with her and that she could have lorazepam is a 0 5 mg q 12 p r n    · Continue to provide emotional support  · Follow-up with Psychiatry upon discharge

## 2022-03-21 NOTE — ASSESSMENT & PLAN NOTE
· Stable  · Continue lithium and Seroquel  · Continue with valbenazine for tardive dyskinesia  · Follow-up with Psychiatry as recommended

## 2022-03-21 NOTE — ASSESSMENT & PLAN NOTE
· PT/OT following  · Fall precautions  · Out of bed as tolerated  · Encourage adequate hydration and nutrition  · Continue PT/OT for continued strength and balance training

## 2022-03-21 NOTE — ASSESSMENT & PLAN NOTE
· Potassium today 3 4  · Will give 40 mEq of potassium today and tomorrow  · Will repeat BMP on Wednesday Quality 431: Preventive Care And Screening: Unhealthy Alcohol Use - Screening: Patient screened for unhealthy alcohol use using a single question and scores less than 2 times per year Quality 110: Preventive Care And Screening: Influenza Immunization: Influenza immunization was not ordered or administered, reason not given Quality 130: Documentation Of Current Medications In The Medical Record: Current Medications Documented Quality 131: Pain Assessment And Follow-Up: Pain assessment using a standardized tool is documented as negative, no follow-up plan required Quality 226: Preventive Care And Screening: Tobacco Use: Screening And Cessation Intervention: Patient screened for tobacco use and is an ex/non-smoker Detail Level: Detailed

## 2022-03-21 NOTE — PROGRESS NOTES
[facility]  [address]  [phone]  [facility]      NAME: Vale Banks  AGE: 60 y o  SEX: female    DATE OF ENCOUNTER: 3/21/2022    Assessment and Plan     Problem List Items Addressed This Visit        Digestive    Esophageal reflux     · Currently stable  · Continue Protonix 20 mg daily            Cardiovascular and Mediastinum    Essential hypertension     · Blood pressure log reviewed, blood pressure this morning 137/86  · Blood pressures have been trending between 117/61 to 155/67 since admission to facility  · Continue with current medication regimen of propanolol 10 mg b i d   · Amlodipine was discontinued while hospitalized due to hypotension, if blood pressures remain elevated and could consider restarting amlodipine for increasing propranolol back to previous dose  · Avoid hypotension  · Periodic lab work, BMP today reviewed  · Continue to monitor and adjust medications as needed            Nervous and Auditory    Tardive dyskinesia     · Continue valbenazine, Lyrica, and lorazepam            Other    Bipolar II disorder (HCC)     · Stable  · Continue lithium and Seroquel  · Continue with valbenazine for tardive dyskinesia  · Follow-up with Psychiatry as recommended         Generalized anxiety disorder     · Was slightly anxious on exam today  · Continue Paxil, BuSpar, and lorazepam p r n  · Discussed patient's medications with her and that she could have lorazepam is a 0 5 mg q 12 p r n  · Continue to provide emotional support  · Follow-up with Psychiatry upon discharge         Ambulatory dysfunction     · PT/OT following  · Fall precautions  · Out of bed as tolerated  · Encourage adequate hydration and nutrition  · Continue PT/OT for continued strength and balance training         S/P total knee arthroplasty, right - Primary     · S/p new right knee arthroplasty on 03/09/2022  · Continue p r n   Oxycodone for pain management- will continue 5mg through 03/23, and start 2 5mg q4 prn for 1 week  · Continue with Lovenox for DVT prophylaxis for total of 30 days  · Continue PT/OT, patient is weight-bearing as tolerated  · Repeat lab work on Wednesday  · Follow-up with orthopedic surgery as recommended         Relevant Medications    oxyCODONE (ROXICODONE) 5 immediate release tablet (Start on 3/24/2022)    Hypokalemia     · Potassium today 3 4  · Will give 40 mEq of potassium today and tomorrow  · Will repeat BMP on Wednesday               No orders of the defined types were placed in this encounter       - Counseling Documentation: patient was counseled regarding: impressions and importance of compliance with treatment    Chief Complaint     No chief complaint on file  History of Present Illness     Samantha Ramirez is a 57-year-old female at [facility] after recent hospitalization for right knee arthroplasty  She is being seen today for follow-up on her acute on chronic medical conditions  Her comorbidities include anxiety, bipolar disorder, tardive dyskinesia, hypertension, GERD, and ambulatory dysfunction  Upon examination patient was sitting in the on the edge of bed, resting  She appeared comfortable and was in no acute distress  She was slightly anxious throughout exam   Pain is mostly well controlled with p r n  Oxycodone  Her vital signs have been stable  She is sleeping well at night  Her appetite is good, eating most of her meals  She denies headaches, dizziness, lightheadedness, chest pain, shortness breath, cough, abdominal pain, and GI  upset  Per nursing no other acute concerns or issues at this time  The following portions of the patient's history were reviewed and updated as appropriate: allergies, current medications, past family history, past medical history, past social history, past surgical history and problem list     Review of Systems     Review of Systems   Constitutional: Positive for activity change and fatigue  Negative for chills and fever  HENT: Negative for sore throat  Eyes: Negative for pain and visual disturbance  Respiratory: Negative for cough and shortness of breath  Cardiovascular: Negative for chest pain and palpitations  Gastrointestinal: Negative for abdominal pain, constipation, diarrhea, nausea and vomiting  Genitourinary: Negative for difficulty urinating, dysuria, frequency and hematuria  Musculoskeletal: Positive for arthralgias and gait problem  Negative for back pain  Skin: Negative for color change and rash (surgical wound)  Neurological: Positive for weakness  Negative for dizziness, seizures, syncope, light-headedness and headaches  Psychiatric/Behavioral: Negative for dysphoric mood  The patient is nervous/anxious          Active Problem List     Patient Active Problem List   Diagnosis    Right knee pain    Chronic pain disorder    Lumbar radiculopathy    Lumbar spondylosis    Fibromyalgia    Lumbar disc disease with radiculopathy    Spinal stenosis of lumbar region with neurogenic claudication    Pain in joint, shoulder region    Intractable low back pain    Bilateral hip pain    Bipolar II disorder (HCC)    Cognitive disorder    Esophageal reflux    Fatigue    Female pelvic pain    Generalized anxiety disorder    Essential hypertension    History of hypokalemia    Insomnia    Major depressive disorder, recurrent episode, moderate degree (HCC)    Migraine    Opioid dependence (HCC)    Osteoarthritis of both hips    Osteoarthritis of knee    Overactive bladder    Left hip pain    Panic disorder without agoraphobia    Post traumatic stress disorder    Primary localized osteoarthritis of both knees    Recent unexplained weight loss    Sacroiliitis (HCC)    Tremor    Urinary incontinence    Vitamin D deficiency    Post laminectomy syndrome    Encounter for long-term use of opiate analgesic    Family history of colorectal cancer    Complaint related to dreams    MIMI (obstructive sleep apnea)    Tardive dyskinesia    Primary osteoarthritis of both knees    Patellofemoral disorder of both knees    Rash    Superficial bacterial infection of skin    Scar of back    Impaired skin integrity associated with surgical incision    Status post insertion of spinal cord stimulator    Ambulatory dysfunction    Dysphagia    Arthritis of right knee    Multiple closed fractures of metatarsal bone of right foot    Chronic back pain    Nausea    Encephalopathy    History of CVA (cerebrovascular accident)    Medical clearance for psychiatric admission    Mixed hyperlipidemia    Leukopenia    Preoperative evaluation to rule out surgical contraindication    CVA [facility]cerebral vascular accident) ([facility] [address] )    S/P total knee arthroplasty, right    Constipation    Hypokalemia       Objective     /86   Pulse 65   Temp 97 8 °F (36 6 °C)   Resp 18   SpO2 95%     Physical Exam  Vitals reviewed  Constitutional:       General: She is not in acute distress  Appearance: She is obese  She is not ill-appearing  Comments: Frail appearing   HENT:      Head: Normocephalic and atraumatic  Mouth/Throat:      Mouth: Mucous membranes are dry  Pharynx: No oropharyngeal exudate or posterior oropharyngeal erythema  Eyes:      General: No scleral icterus  Right eye: No discharge  Left eye: No discharge  Cardiovascular:      Rate and Rhythm: Normal rate and regular rhythm  Pulses: Normal pulses  Heart sounds: Normal heart sounds  No murmur heard  Pulmonary:      Effort: Pulmonary effort is normal  No respiratory distress  Breath sounds: Normal breath sounds  No wheezing  Abdominal:      General: Bowel sounds are normal  There is no distension  Palpations: Abdomen is soft  Tenderness: There is no abdominal tenderness  Musculoskeletal:         General: Tenderness present        Right lower leg: Edema present  Left lower leg: Edema present  Skin:     General: Skin is warm and dry  Coloration: Skin is not jaundiced or pale  Findings: No bruising  Comments: Surgical wound healing right knee   Neurological:      General: No focal deficit present  Mental Status: She is alert and oriented to person, place, and time  Mental status is at baseline  Motor: Weakness present  Gait: Gait abnormal       Comments: forgetful   Psychiatric:         Mood and Affect: Mood is anxious  Behavior: Behavior normal          Pertinent Laboratory/Diagnostic Studies:  Labs reviewed  Zena MADRID    Please note:  Voice-recognition software may have been used in the preparation of this document  Occasional wrong word or "sound-alike" substitutions may have occurred due to the inherent limitations of voice recognition software  Interpretation should be guided by context

## 2022-03-21 NOTE — ASSESSMENT & PLAN NOTE
· Blood pressure log reviewed, blood pressure this morning 137/86  · Blood pressures have been trending between 117/61 to 155/67 since admission to facility  · Continue with current medication regimen of propanolol 10 mg b i d   · Amlodipine was discontinued while hospitalized due to hypotension, if blood pressures remain elevated and could consider restarting amlodipine for increasing propranolol back to previous dose  · Avoid hypotension  · Periodic lab work, BMP today reviewed  · Continue to monitor and adjust medications as needed

## 2022-03-24 ENCOUNTER — OFFICE VISIT (OUTPATIENT)
Dept: OBGYN CLINIC | Facility: CLINIC | Age: 57
End: 2022-03-24

## 2022-03-24 ENCOUNTER — NURSING HOME VISIT (OUTPATIENT)
Dept: GERIATRICS | Facility: OTHER | Age: 57
End: 2022-03-24
Payer: MEDICARE

## 2022-03-24 ENCOUNTER — APPOINTMENT (OUTPATIENT)
Dept: PHYSICAL THERAPY | Facility: CLINIC | Age: 57
End: 2022-03-24
Payer: MEDICARE

## 2022-03-24 VITALS
SYSTOLIC BLOOD PRESSURE: 130 MMHG | HEIGHT: 60 IN | WEIGHT: 201.3 LBS | DIASTOLIC BLOOD PRESSURE: 78 MMHG | HEART RATE: 84 BPM | BODY MASS INDEX: 39.52 KG/M2

## 2022-03-24 DIAGNOSIS — F41.1 GENERALIZED ANXIETY DISORDER: Primary | ICD-10-CM

## 2022-03-24 DIAGNOSIS — Z96.651 STATUS POST TOTAL KNEE REPLACEMENT, RIGHT: Primary | ICD-10-CM

## 2022-03-24 PROCEDURE — 99024 POSTOP FOLLOW-UP VISIT: CPT | Performed by: ORTHOPAEDIC SURGERY

## 2022-03-24 PROCEDURE — 99307 SBSQ NF CARE SF MDM 10: CPT | Performed by: INTERNAL MEDICINE

## 2022-03-24 RX ORDER — ASPIRIN 325 MG
325 TABLET, DELAYED RELEASE (ENTERIC COATED) ORAL 2 TIMES DAILY
Qty: 56 TABLET | Refills: 0 | Status: SHIPPED | OUTPATIENT
Start: 2022-03-24 | End: 2022-04-05 | Stop reason: SDUPTHER

## 2022-03-24 RX ORDER — OXYCODONE HYDROCHLORIDE 5 MG/1
5 TABLET ORAL EVERY 6 HOURS PRN
Qty: 30 TABLET | Refills: 0 | Status: SHIPPED | OUTPATIENT
Start: 2022-03-24 | End: 2022-03-25

## 2022-03-24 NOTE — PROGRESS NOTES
Assessment/Plan:  1  Status post total knee replacement, right      Orders Placed This Encounter   Procedures    XR knee 3 vw right non injury       · Continue PT  Will transition to outpatient once dc from [address] [place] [address]  · Pain control prn- oxycodone, gabapentin, and tylenol  Patient aware to wean away from opioids  It is acceptable for patient to be on oxycodone 5 mg Q6 prn severe pain for another week  She may then wean down the week after  I did send a script if needed by the facility  · Continue with lovenox for DVT prophylaxis while at [address] [place] [address], then will transition to  bid after discharge from [address] [place] [address] for a total of 6 weeks post op  Script in patient's chart  · ANTIONE stockings as needed for swelling  · May shower and let water run over incision, do not submerge incision  · Patient should call ahead for abx prior to dental appts  Return in about 4 weeks (around 4/21/2022)  I answered all of the patient's questions during the visit and provided education of the patient's condition during the visit  The patient verbalized understanding of the information given and agrees with the plan  This note was dictated using [employer] software  It may contain errors including improperly dictated words  Please contact physician directly for any questions  Subjective   Chief Complaint:   Chief Complaint   Patient presents with    Right Knee - Post-op       Samantha CRISTIANO Terence Patterson is a 60 y o  female who presents for 2 week  follow up s/p right TKA on 3/9/22  Patient is currently at [address] [place] [address]  Patient states she is having a lot of pain in the right knee mostly at night  She states they are weaning down the oxycodone to 2 5 mg every 6 hours which is not controlling her pain at this time  She is also getting tylenol and gabapentin  She is taking lovenox for DVT prophylaxis  She is getting therapy multiple times per day and is able to walk 50 feet with a walker  Patient denies fevers, distal numbness, or tingling       Review of Systems  ROS:    See HPI for musculoskeletal review  All other systems reviewed are negative     History:  Past Medical History:   Diagnosis Date    Acid reflux     Anxiety     RESOLVED: [id number]    Arthritis     Bipolar 2 disorder (HCC)     FOLLOWS WITH PSYCHIATRIST  CONTINUE LAMOTRIGINE; RESOLVED: [id number]    Depression     Familial tremor     both hands    Fibromyalgia     LAST ASSESSED: [id number]    GERD (gastroesophageal reflux disease)     Hearing aid worn     left ear    [place] (hard of hearing)     left ear    Hyperlipidemia     Hypertension     Left-sided weakness     Lower back pain     Memory loss of unknown cause     long and short term    Migraine     Obesity     Obesity, Class II, BMI 35-39 9     Overactive bladder     Panic attack     Post traumatic stress disorder     Seasonal allergies     Stroke 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    CONT ASA 81 MG DAILY; RESOLVED: [id number]    Urinary incontinence     Wears dentures     partial lower / full upper    Wears glasses      Past Surgical History:   Procedure Laterality Date    BACK SURGERY       SECTION      COLONOSCOPY      RESOLVED: [id number]    EAR SURGERY      EGD      HYSTERECTOMY      MYRINGOTOMY W/ TUBES Left     NECK SURGERY  2019    [place] CYSTOURETHROSCOPY N/A 2016    Procedure: CYSTOSCOPY, BOTOX INJECTION;  Surgeon: Shonda Lee MD;  [facility];  Service: Gynecology    [place] IMPLANT SPINAL NEUROSTIM/ Right 2/10/2021    Procedure: REPLACEMENT IMPLANTABLE PULSE GENERATOR DORSAL SPINAL COLUMN STIMULATOR, RIGHT;  Surgeon: Peng Mcclendon MD;  Location:  MAIN OR;  Service: Neurosurgery    [place] PERCUT IMPLNT [place] Right 2020    Procedure: INSERTION THORACIC DORSAL COLUMN SPINAL CORD STIMULATOR PERCUTANEOUS W IMPLANTABLE PULSE GENERATOR, RIGHT;  Surgeon: Peng Mcclendon MD;  Location:  MAIN OR;  Service: Neurosurgery    ND TOTAL KNEE ARTHROPLASTY Right 3/9/2022    Procedure: ARTHROPLASTY KNEE TOTAL;  Surgeon: Ivan Hodges DO;  Location: [facility];  Service: [facility]    UPPER GASTROINTESTINAL ENDOSCOPY  09/2020     Social History   Social History     Substance and Sexual Activity   Alcohol Use Not Currently    Comment: 2 x year; being a social drinker as per all scripts      Social History     Substance and Sexual Activity   Drug Use No     Social History     Tobacco Use   Smoking Status Never Smoker   Smokeless Tobacco Never Used     Family History:   Family History   Problem Relation Age of Onset    Colon cancer Mother     Alzheimer's disease Father     Stroke Father     Colon cancer Brother     Bipolar disorder Brother     Breast cancer Maternal Aunt     Colon cancer Maternal Aunt     Heart disease Other     Diabetes Other     Hypertension Other     Seizures Son     Depression Paternal Grandfather     No Known Problems Sister     No Known Problems Brother     Thyroid disease Neg Hx        Current Outpatient Medications on File Prior to Visit   Medication Sig Dispense Refill    acetaminophen (TYLENOL) 325 mg tablet Take 3 tablets (975 mg total) by mouth every 8 (eight) hours  0    atorvastatin (LIPITOR) 40 mg tablet TAKE 1 TABLET BY MOUTH DAILY WITH DINNER 90 tablet 1    busPIRone (BUSPAR) 10 mg tablet Take 20 mg by mouth 3 (three) times a day       Calcium Ascorbate (VITAMIN C) 500 mg tablet Take 1 tablet (500 mg total) by mouth daily 30 tablet 1    calcium carbonate (TUMS) 500 mg chewable tablet Chew 2 tablets (1,000 mg total) daily as needed for indigestion or heartburn  0    [employer]S Aspirin Adult Low Dose 81 MG chewable tablet CHEW 1 TABLET BY MOUTH EVERY DAY 90 tablet 3    Diapers & Supplies (Huggies Pull-Ups) MISC Use 3 (three) times a day 100 each 3    enoxaparin (LOVENOX) 40 mg/0 4 mL Inject 0 4 mL (40 mg total) under the skin daily for 21 days  0    ferrous sulfate 324 (65 Fe) mg TAKE 1 TABLET (324 MG TOTAL) BY MOUTH IN THE MORNING 90 tablet 1    folic acid (FOLVITE) 1 mg tablet Take 1 tablet (1 mg total) by mouth daily 30 tablet 1    hydrOXYzine HCL (ATARAX) 50 mg tablet Take 50 mg by mouth 3 (three) times a day as needed for itching      lithium carbonate (LITHOBID) 300 mg CR tablet Take 1 tablet (300 mg total) by mouth daily at bedtime 30 tablet 0    LORazepam (ATIVAN) 0 5 mg tablet Take 1 tablet (0 5 mg total) by mouth 2 (two) times a day as needed for anxiety for up to 10 days 10 tablet 0    oxyCODONE (ROXICODONE) 5 immediate release tablet Take 1 tablet (5 mg total) by mouth every 4 (four) hours as needed for moderate pain for up to 10 days Max Daily Amount: 30 mg 20 tablet 0    oxyCODONE (ROXICODONE) 5 immediate release tablet Take 0 5 tablets (2 5 mg total) by mouth every 4 (four) hours as needed for moderate pain or severe pain for up to 7 days Max Daily Amount: 15 mg 20 tablet 0    pantoprazole (PROTONIX) 20 mg tablet TAKE 1 TABLET (20 MG TOTAL) BY MOUTH DAILY IN THE EARLY MORNING 30 tablet 1    PARoxetine (PAXIL) 30 mg tablet Take 2 tablets (60 mg total) by mouth daily 60 tablet 0    polyethylene glycol (MIRALAX) 17 g packet Take 17 g by mouth daily as needed      potassium chloride (K-DUR,KLOR-CON) 20 mEq tablet Take 1 tablet (20 mEq total) by mouth daily 90 tablet 1    prazosin (MINIPRESS) 2 mg capsule Take 1 capsule (2 mg total) by mouth daily at bedtime 30 capsule 0    pregabalin (LYRICA) 150 mg capsule Take 1 capsule (150 mg total) by mouth 3 (three) times a day 120 capsule 1    propranolol (INDERAL) 10 mg tablet Take 1 tablet (10 mg total) by mouth 2 (two) times a day before breakfast and lunch  0    QUEtiapine (SEROquel) 25 mg tablet Take 2 tablets (50 mg total) by mouth daily at bedtime 120 tablet 0    senna (SENOKOT) 8 6 mg Take 2 tablets (17 2 mg total) by mouth daily at bedtime  0    Valbenazine Tosylate (Ingrezza) 80 MG CAPS Take 80 mg by mouth daily 90 capsule 0     No current facility-administered medications on file prior to visit       No Known Allergies     Objective     /78   Pulse 84   Ht 5' (1 524 m)   Wt 91 3 kg (201 lb 4 8 oz)   BMI 39 31 kg/m²      PE:  AAOx 3  WDWN  Hearing intact, no drainage from eyes  no audible wheezing  no abdominal distension  LE compartments soft, AT/GS intact    Ortho Exam:  right Knee:   INC: C/D/I, No erythema, mild swelling  AROM:10 - 110 degrees    Imaging Studies: I have personally reviewed pertinent films in PACS  XR right knee:  S/p TKA, adequate alignment

## 2022-03-25 ENCOUNTER — NURSING HOME VISIT (OUTPATIENT)
Dept: GERIATRICS | Facility: OTHER | Age: 57
End: 2022-03-25
Payer: MEDICARE

## 2022-03-25 VITALS
HEART RATE: 72 BPM | DIASTOLIC BLOOD PRESSURE: 61 MMHG | RESPIRATION RATE: 18 BRPM | OXYGEN SATURATION: 96 % | TEMPERATURE: 96.7 F | SYSTOLIC BLOOD PRESSURE: 109 MMHG

## 2022-03-25 DIAGNOSIS — R26.2 AMBULATORY DYSFUNCTION: ICD-10-CM

## 2022-03-25 DIAGNOSIS — Z96.651 S/P TOTAL KNEE ARTHROPLASTY, RIGHT: Primary | ICD-10-CM

## 2022-03-25 DIAGNOSIS — I10 ESSENTIAL HYPERTENSION: ICD-10-CM

## 2022-03-25 DIAGNOSIS — E87.6 HYPOKALEMIA: ICD-10-CM

## 2022-03-25 DIAGNOSIS — K21.9 GERD (GASTROESOPHAGEAL REFLUX DISEASE): ICD-10-CM

## 2022-03-25 DIAGNOSIS — G24.01 TARDIVE DYSKINESIA: ICD-10-CM

## 2022-03-25 DIAGNOSIS — F41.1 GENERALIZED ANXIETY DISORDER: ICD-10-CM

## 2022-03-25 DIAGNOSIS — F31.81 BIPOLAR II DISORDER (HCC): ICD-10-CM

## 2022-03-25 PROCEDURE — 99316 NF DSCHRG MGMT 30 MIN+: CPT | Performed by: NURSE PRACTITIONER

## 2022-03-25 RX ORDER — POTASSIUM CHLORIDE 750 MG/1
10 TABLET, EXTENDED RELEASE ORAL DAILY
COMMUNITY

## 2022-03-25 RX ORDER — ATORVASTATIN CALCIUM 40 MG/1
40 TABLET, FILM COATED ORAL DAILY
COMMUNITY
End: 2022-03-28 | Stop reason: SDUPTHER

## 2022-03-25 RX ORDER — SENNA PLUS 8.6 MG/1
1 TABLET ORAL DAILY PRN
COMMUNITY

## 2022-03-25 RX ORDER — ACETAMINOPHEN 325 MG/1
975 TABLET ORAL EVERY 8 HOURS
COMMUNITY
End: 2022-04-21 | Stop reason: ALTCHOICE

## 2022-03-25 RX ORDER — CALCIUM CARBONATE 200(500)MG
2 TABLET,CHEWABLE ORAL EVERY 8 HOURS PRN
COMMUNITY

## 2022-03-25 RX ORDER — OXYCODONE HYDROCHLORIDE 5 MG/1
5 TABLET ORAL EVERY 6 HOURS PRN
COMMUNITY
Start: 2022-03-25 | End: 2022-04-01

## 2022-03-25 NOTE — ASSESSMENT & PLAN NOTE
· With increased anxiety yesterday, was seen by Dr Jb Apple and Ativan was increased to 0 5mg q 8 prn from q 12 p r n   · Psych consult was ordered ordered and discontinued by Dr Jb Apple  · Continue Paxil, BuSpar, lorazepam p r n  · Patient slightly anxious today due to wanting to go today, explained the patient will be discharged home tomorrow with family at 10:00 a m    ·  Continue to provide emotional support  ·  patient to be discharged home family tomorrow  ·  follow-up Psychiatry discharge

## 2022-03-25 NOTE — PROGRESS NOTES
[facility]  [address]  [phone]  [facility]  Quick note      NAME: Sheryl Condon  AGE: 60 y o  SEX: female [id number]    DATE OF ENCOUNTER: 3/24/2022    Assessment and Plan     Anxiety  -patient with acute exacerbation of anxiety despite receiving Lorazepam 0 5mg which has been available Q12H PRN  -unable to reduce anxiety with psychosocial support - symptoms consistent with patients evening behaviors of anxiety exacerbation without any red flag symptoms   -temporarily increase lorazepam dosing to 0 5 mg Q8H PRN for five days  -consult Psychiatry for evaluation of uncontrolled anxiety    Chief Complaint     Generalized anxiety disorder    History of Present Illness     Patient seen and examined at bedside, she c/o acute anxiety exacerbation, no specific trigger identified, nursing reports patient has recurrent episodes nightly and they are not sufficiently alleviated by current medication regimen  Review of Systems     Review of Systems   Respiratory: Positive for shortness of breath  Cardiovascular: Negative for chest pain  Musculoskeletal: Positive for back pain and gait problem  Psychiatric/Behavioral: The patient is nervous/anxious and is hyperactive  All other systems reviewed and are negative      Active Problem List     Patient Active Problem List   Diagnosis    Right knee pain    Chronic pain disorder    Lumbar radiculopathy    Lumbar spondylosis    Fibromyalgia    Lumbar disc disease with radiculopathy    Spinal stenosis of lumbar region with neurogenic claudication    Pain in joint, shoulder region    Intractable low back pain    Bilateral hip pain    Bipolar II disorder ([address] )    Cognitive disorder    Esophageal reflux    Fatigue    Female pelvic pain    Generalized anxiety disorder    Essential hypertension    History of hypokalemia    Insomnia    Major depressive disorder, recurrent episode, moderate degree ([address] )    Migraine    Opioid dependence (HCC)    Osteoarthritis of both hips    Osteoarthritis of knee    Overactive bladder    Left hip pain    Panic disorder without agoraphobia    Post traumatic stress disorder    Primary localized osteoarthritis of both knees    Recent unexplained weight loss    Sacroiliitis (HCC)    Tremor    Urinary incontinence    Vitamin D deficiency    Post laminectomy syndrome    Encounter for long-term use of opiate analgesic    Family history of colorectal cancer    Complaint related to dreams    MIMI (obstructive sleep apnea)    Tardive dyskinesia    Primary osteoarthritis of both knees    Patellofemoral disorder of both knees    Rash    Superficial bacterial infection of skin    Scar of back    Impaired skin integrity associated with surgical incision    Status post insertion of spinal cord stimulator    Ambulatory dysfunction    Dysphagia    Arthritis of right knee    Multiple closed fractures of metatarsal bone of right foot    Chronic back pain    Nausea    Encephalopathy    History of CVA (cerebrovascular accident)    Medical clearance for psychiatric admission    Mixed hyperlipidemia    Leukopenia    Preoperative evaluation to rule out surgical contraindication    CVA [facility]cerebral vascular accident) ([facility] [address] )    S/P total knee arthroplasty, right    Constipation    Hypokalemia     Objective     T 96 7°, P 74, R 18, /78, O2 99% room air    Physical Exam  Vitals reviewed  Constitutional:       General: She is not in acute distress  Appearance: She is not ill-appearing, toxic-appearing or diaphoretic  HENT:      Head: Normocephalic and atraumatic  Mouth/Throat:      Mouth: Mucous membranes are moist    Eyes:      Conjunctiva/sclera: Conjunctivae normal    Neck:      Comments: Phonation normal  Pulmonary:      Effort: Pulmonary effort is normal  No respiratory distress     Musculoskeletal:      Comments: Exaggerated kyphosis    No tenderness thoracic spine   Skin:     General: Skin is warm and dry  Neurological:      Mental Status: She is alert  Comments: Awake and alert, answers questions appropriately and follows commands   Psychiatric:      Comments: Anxious and unable to be reassured or redirected, restless        Pertinent Laboratory/Diagnostic Studies:    N/A    Current Medications   Medications reviewed and updated in facility chart

## 2022-03-25 NOTE — PROGRESS NOTES
[facility]  [address]  [phone]  [facility]  Discharge Summary      NAME: Mary Jo Killian  AGE: 60 y o  SEX: female    DATE OF ENCOUNTER: 3/25/2022    Assessment and Plan     Problem List Items Addressed This Visit        Cardiovascular and Mediastinum    Essential hypertension     · Blood pressure log reviewed and stable, blood pressure today 109/61  · Blood pressures have trended between 109/61 to 169/88 over the last week  · Amlodipine was discontinued while hospitalized due to hypotension  · Continue propanolol 10 mg b i d , propranolol was also decreased due to hypotension while hospitalized  · Follow-up with PCP on discharge            Nervous and Auditory    Tardive dyskinesia     · Stable  · Continue valbenazine, Lyrica and lorazepam   · F/U with Psychiatry on discharge            Other    Bipolar II disorder (HCC)     · Stable  · Continue with lithium and Seroquel  · Continue with valbenzine for tardive dyskinesia   · Follow-up with psychiatry on discharge         Generalized anxiety disorder     · With increased anxiety yesterday, was seen by Dr Mauri Agee and Ativan was increased to 0 5mg q 8 prn from q 12 p r n   · Psych consult was ordered ordered and discontinued by Dr Mauri Agee  · Continue Paxil, BuSpar, lorazepam p r n  · Patient slightly anxious today due to wanting to go today, explained the patient will be discharged home tomorrow with family at 10:00 a m    ·  Continue to provide emotional support  ·  patient to be discharged home family tomorrow  ·  follow-up Psychiatry discharge         Ambulatory dysfunction     · PT/OT have following  · Fall precautions  · As tolerated  · Encourage adequate hydration and nutrition, patient did have 12lb weight loss since admission to facility- patient states she doesn't like the foods and has been watching what she's been eating encouraged her to follow a healthy diet on discharge and follow up with PCP  · Goal is to return home with family tomorrow  · Continue PT/OT for continued strength and balance training         S/P total knee arthroplasty, right - Primary     · S/p right knee arthroplasty on 03/09/2022  · Discussed with orthopedics will continue oxycodone 5mg q6prn, script provided by orthopedics- nursing staff instructed not to send patient home with left over oxycodone from facility as the family has the script at home as per the daughter  · Per orthopedic note Lovenox to be discontinued on discharge and aspirin 325 mg b i d  To be started x 6 weeks- should restart aspirin 81mg daily after 6 weeks  · Continue PT/OT, weight-bearing as tolerated  · Incision with Steri-Strips intact, explained to patient that she should not removed Steri-Strips and to just let him follow-up herself  · Okay for patient to shower, but do not submerge knee in water  · Follow-up with orthopedic surgery as recommended         Hypokalemia     · Potassium today 3 1  · Ordered for 40 mEq of potassium once now and tonight  · Increased standing dose of potassium from 20 mEq to 30 mEq daily  · Will provide script for BMP to be done on discharge           Other Visit Diagnoses     GERD (gastroesophageal reflux disease)        Relevant Medications    calcium carbonate (TUMS) 500 mg chewable tablet          No orders of the defined types were placed in this encounter       - Counseling Documentation: patient was counseled regarding: impressions and importance of compliance with treatment    Chief Complaint     No chief complaint on file  History of Present Illness     Samantha Atkins is a 58-year-old female at [place] [address] her TCU after recent hospitalization for right knee arthroplasty  Tyrell Stark is being seen today for follow-up on her acute on chronic medical conditions and for her pending discharge home with family   Social work to arrange home health services  Patient to follow-up with her PCP, Orthopedics, and psychiatry on discharge    Her comorbidities include anxiety, bipolar disorder, tardive dyskinesia, hypertension, GERD, and ambulatory dysfunction  While admitted to [facility] patient worked with PT/OT, modified independent for bed mobility and transfers  Ambulating 75 ft with 2 wheeled walker  Able to do 11 steps  Sitting balance is good  Standing balance is fair  She will be discharged home with her daughter, script provided diet repeat lab work done in 1 week with results faxed to PCP     Upon examination patient was sitting in the on the edge of bed, resting   She appeared comfortable and was in no acute distress   She was slightly anxious throughout exam  Pereira Ort is well controlled with p r n  Oxycodone   Her vital signs have been stable   She is sleeping well at night   Her appetite is good, eating between % of most meals   She denies headaches, dizziness, lightheadedness, chest pain, shortness breath, cough, abdominal pain, and GI/ upset   Per nursing no other acute concerns or issues at this time  The following portions of the patient's history were reviewed and updated as appropriate: allergies, current medications, past family history, past medical history, past social history, past surgical history and problem list     Review of Systems     Review of Systems   Constitutional: Positive for fatigue  Negative for activity change, appetite change, chills and fever  HENT: Negative for sore throat  Eyes: Negative for pain and visual disturbance  Respiratory: Negative for cough and shortness of breath  Cardiovascular: Negative for chest pain and palpitations  Gastrointestinal: Negative for abdominal distention, abdominal pain, constipation, diarrhea, nausea and vomiting  Genitourinary: Negative for difficulty urinating, dysuria, frequency and hematuria  Musculoskeletal: Positive for arthralgias and gait problem  Negative for back pain  Skin: Negative for color change and rash (surgical wound)     Neurological: Positive for weakness  Negative for dizziness, seizures, syncope, light-headedness and headaches  Psychiatric/Behavioral: Negative for dysphoric mood, self-injury and suicidal ideas  The patient is nervous/anxious          Active Problem List     Patient Active Problem List   Diagnosis    Right knee pain    Chronic pain disorder    Lumbar radiculopathy    Lumbar spondylosis    Fibromyalgia    Lumbar disc disease with radiculopathy    Spinal stenosis of lumbar region with neurogenic claudication    Pain in joint, shoulder region    Intractable low back pain    Bilateral hip pain    Bipolar II disorder ([facility])    Cognitive disorder    Esophageal reflux    Fatigue    Female pelvic pain    Generalized anxiety disorder    Essential hypertension    History of hypokalemia    Insomnia    Major depressive disorder, recurrent episode, moderate degree ([facility])    Migraine    Opioid dependence ([facility])    Osteoarthritis of both hips    Osteoarthritis of knee    Overactive bladder    Left hip pain    Panic disorder without agoraphobia    Post traumatic stress disorder    Primary localized osteoarthritis of both knees    Recent unexplained weight loss    Sacroiliitis ([facility])    Tremor    Urinary incontinence    Vitamin D deficiency    Post laminectomy syndrome    Encounter for long-term use of opiate analgesic    Family history of colorectal cancer    Complaint related to dreams    MIMI (obstructive sleep apnea)    Tardive dyskinesia    Primary osteoarthritis of both knees    Patellofemoral disorder of both knees    Rash    Superficial bacterial infection of skin    Scar of back    Impaired skin integrity associated with surgical incision    Status post insertion of spinal cord stimulator    Ambulatory dysfunction    Dysphagia    Arthritis of right knee    Multiple closed fractures of metatarsal bone of right foot    Chronic back pain    Nausea    Encephalopathy    History of CVA (cerebrovascular accident)    Medical clearance for psychiatric admission    Mixed hyperlipidemia    Leukopenia    Preoperative evaluation to rule out surgical contraindication    CVA (cerebral vascular accident) ([address]    S/P total knee arthroplasty, right    Constipation    Hypokalemia       Objective     /61   Pulse 72   Temp (!) 96 7 °F (35 9 °C)   Resp 18   SpO2 96%     Physical Exam  Vitals reviewed  Constitutional:       General: She is not in acute distress  Appearance: She is obese  She is not ill-appearing  Comments: Frail appearing   HENT:      Head: Normocephalic and atraumatic  Mouth/Throat:      Mouth: Mucous membranes are dry  Pharynx: No oropharyngeal exudate or posterior oropharyngeal erythema  Eyes:      General: No scleral icterus  Right eye: No discharge  Left eye: No discharge  Cardiovascular:      Rate and Rhythm: Normal rate and regular rhythm  Pulses: Normal pulses  Heart sounds: Normal heart sounds  No murmur heard  Pulmonary:      Effort: Pulmonary effort is normal  No respiratory distress  Breath sounds: Normal breath sounds  No wheezing  Abdominal:      General: Bowel sounds are normal  There is no distension  Palpations: Abdomen is soft  Tenderness: There is no abdominal tenderness  Musculoskeletal:         General: Tenderness present  Right lower leg: Edema present  Left lower leg: Edema present  Skin:     General: Skin is warm and dry  Coloration: Skin is not jaundiced or pale  Findings: No bruising  Comments: Surgical wound healing right knee, steri strips intact   Neurological:      General: No focal deficit present  Mental Status: She is alert and oriented to person, place, and time  Mental status is at baseline  Motor: Weakness present  Gait: Gait abnormal       Comments: forgetful   Psychiatric:         Mood and Affect: Mood is anxious   Affect is flat          Behavior: Behavior normal          Pertinent Laboratory/Diagnostic Studies:  Labs reviewed in facility paper chart  Current Medications     Acetaminophen 975 mg Oral Every 8 hours    Aspirin 325 mg Oral 2 times daily, Take with food  Do not take your aspirin 81 mg tablet while taking this  Atorvastatin Calcium 40 mg Oral Daily    busPIRone HCl 20 mg Oral 3 times daily    Calcium Ascorbate 500 mg Oral Daily    Calcium Carbonate Antacid 500 mg 2 tablets Oral Every 8 hours PRN    Ferrous Sulfate 324 mg Oral Daily    Folic Acid 1 mg Oral Daily    hydrOXYzine HCl 50 mg Oral 3 times daily PRN    Lithium Carbonate 300 mg Oral Daily at bedtime    LORazepam 0 5 mg Oral 2 times daily PRN    oxyCODONE HCl 5 mg Oral Every 6 hours PRN    Pantoprazole Sodium 20 mg Oral Daily (early morning)    PARoxetine HCl 60 mg Oral Daily    Polyethylene Glycol 3350 17 g Oral Daily PRN    Potassium Chloride Lyudmila ER 10 mEq 30 mEq Oral Daily    Prazosin HCl 2 mg Oral Daily at bedtime    Pregabalin 150 mg Oral 3 times daily    Propranolol HCl 10 mg Oral 2 times daily (before breakfast and lunch)    QUEtiapine Fumarate 50 mg Oral Daily at bedtime    Sennosides 8 6 MG 1 tablet Oral Daily PRN    Valbenazine Tosylate 80 mg Oral Daily    Time spent greater than 30 minutes with coordination of care, direct patient care, patient examination, teaching, and chart review  Maribel MADRID    Please note:  Voice-recognition software may have been used in the preparation of this document  Occasional wrong word or "sound-alike" substitutions may have occurred due to the inherent limitations of voice recognition software  Interpretation should be guided by context

## 2022-03-25 NOTE — ASSESSMENT & PLAN NOTE
· Stable  · Continue with lithium and Seroquel  · Continue with valbenzine for tardive dyskinesia   · Follow-up with psychiatry on discharge

## 2022-03-25 NOTE — ASSESSMENT & PLAN NOTE
· PT/OT have following  · Fall precautions  · As tolerated  · Encourage adequate hydration and nutrition, patient did have 12lb weight loss since admission to facility- patient states she doesn't like the foods and has been watching what she's been eating encouraged her to follow a healthy diet on discharge and follow up with PCP  · Goal is to return home with family tomorrow  · Continue PT/OT for continued strength and balance training

## 2022-03-25 NOTE — ASSESSMENT & PLAN NOTE
· Blood pressure log reviewed and stable, blood pressure today 109/61  · Blood pressures have trended between 109/61 to 169/88 over the last week  · Amlodipine was discontinued while hospitalized due to hypotension  · Continue propanolol 10 mg b i d , propranolol was also decreased due to hypotension while hospitalized  · Follow-up with PCP on discharge

## 2022-03-25 NOTE — ASSESSMENT & PLAN NOTE
· Potassium today 3 1  · Ordered for 40 mEq of potassium once now and tonight  · Increased standing dose of potassium from 20 mEq to 30 mEq daily  · Will provide script for BMP to be done on discharge

## 2022-03-25 NOTE — ASSESSMENT & PLAN NOTE
· S/p right knee arthroplasty on 03/09/2022  · Discussed with orthopedics will continue oxycodone 5mg q6prn, script provided by orthopedics- nursing staff instructed not to send patient home with left over oxycodone from facility as the family has the script at home as per the daughter  · Per orthopedic note Lovenox to be discontinued on discharge and aspirin 325 mg b i d   To be started x 6 weeks- should restart aspirin 81mg daily after 6 weeks  · Continue PT/OT, weight-bearing as tolerated  · Incision with Steri-Strips intact, explained to patient that she should not removed Steri-Strips and to just let him follow-up herself  · Okay for patient to shower, but do not submerge knee in water  · Follow-up with orthopedic surgery as recommended

## 2022-03-28 ENCOUNTER — APPOINTMENT (OUTPATIENT)
Dept: PHYSICAL THERAPY | Facility: CLINIC | Age: 57
End: 2022-03-28
Payer: MEDICARE

## 2022-03-28 DIAGNOSIS — Z86.73 HISTORY OF CVA (CEREBROVASCULAR ACCIDENT): Primary | ICD-10-CM

## 2022-03-28 NOTE — TELEPHONE ENCOUNTER
Pt left message on voice mail regarding her Rx refill for oxy  Pt had recent surgery with Dr Jackie Pedraza  No other information left on voice mail      Pt contacted by Lukasz Padron

## 2022-03-28 NOTE — TELEPHONE ENCOUNTER
Spoke with patient  She did not  the script that I sent on 3/24  She was discharged from [address] [place] [address] without more oxy (confirmed in provider note on 3/25)  Script for oxycodone still available at [facility]  I confirmed with [facility] that patient did not get any oxycodone from this pharmacy  Patient aware she will be weaned off oxycodone  No other questions

## 2022-03-28 NOTE — TELEPHONE ENCOUNTER
Called pt to elaborate on her request  She is requesting a refill of oxycodone  She reports she is having "quite a lot of pain" in her knee, she denies other concerning symptoms like fevers or swelling  She denies taking any tylenol, reports she has no tylenol and could not afford to get any OTC  She reports she is only interested in oxycodone refill, NN did educate Surgeon may/may not refill this and that she may need to be open to considering alternatives, encouraged to ice knee to assist with local pain control  She is requesting oxycodone RX be sent to [address] [facility], [facility] [facility] if possible

## 2022-03-28 NOTE — TELEPHONE ENCOUNTER
Pt was just released from hospital   Pt State's on discharge, she was told to call Dr Eusebia Paul for the pain she is experiencing in her R knee         [facility] [address]    Pt # [phone]

## 2022-03-28 NOTE — TELEPHONE ENCOUNTER
Patient had called us to contact her pharmacy  She didn't know why  I called her pharmacy  They stated they were just seeing if patient should be continuing the Atorvastatin  If she should be, they need a refill sent into the pharmacy

## 2022-03-29 RX ORDER — ATORVASTATIN CALCIUM 40 MG/1
40 TABLET, FILM COATED ORAL DAILY
Qty: 30 TABLET | Refills: 0 | Status: SHIPPED | OUTPATIENT
Start: 2022-03-29 | End: 2022-04-14

## 2022-03-31 ENCOUNTER — APPOINTMENT (OUTPATIENT)
Dept: PHYSICAL THERAPY | Facility: CLINIC | Age: 57
End: 2022-03-31
Payer: MEDICARE

## 2022-03-31 ENCOUNTER — PATIENT OUTREACH (OUTPATIENT)
Dept: INTERNAL MEDICINE CLINIC | Facility: CLINIC | Age: 57
End: 2022-03-31

## 2022-03-31 NOTE — PROGRESS NOTES
Outpatient Care Management Note:    I called patient and no answer  Message left this is the nurse Lluvia Lowe calling with her PCP office to follow up with her to see how she is feeling and managing at home after her short term rehab stay following her right knee replacement on 3/9/22  Patient was scheduled with PCP office today to follow up and rescheduled to 4/5/22

## 2022-04-04 ENCOUNTER — HOSPITAL ENCOUNTER (EMERGENCY)
Facility: HOSPITAL | Age: 57
Discharge: HOME | End: 2022-04-04
Attending: EMERGENCY MEDICINE
Payer: MEDICARE

## 2022-04-04 ENCOUNTER — APPOINTMENT (EMERGENCY)
Dept: RADIOLOGY | Facility: HOSPITAL | Age: 57
End: 2022-04-04
Payer: MEDICARE

## 2022-04-04 VITALS
TEMPERATURE: 98 F | WEIGHT: 200 LBS | DIASTOLIC BLOOD PRESSURE: 74 MMHG | BODY MASS INDEX: 39.06 KG/M2 | HEART RATE: 90 BPM | OXYGEN SATURATION: 100 % | SYSTOLIC BLOOD PRESSURE: 123 MMHG | RESPIRATION RATE: 18 BRPM

## 2022-04-04 DIAGNOSIS — M25.561 ACUTE PAIN OF RIGHT KNEE: Primary | ICD-10-CM

## 2022-04-04 PROCEDURE — 99284 EMERGENCY DEPT VISIT MOD MDM: CPT | Performed by: EMERGENCY MEDICINE

## 2022-04-04 PROCEDURE — 73564 X-RAY EXAM KNEE 4 OR MORE: CPT

## 2022-04-04 PROCEDURE — 99283 EMERGENCY DEPT VISIT LOW MDM: CPT

## 2022-04-04 RX ORDER — OXYCODONE HYDROCHLORIDE 5 MG/1
5 TABLET ORAL ONCE
Status: COMPLETED | OUTPATIENT
Start: 2022-04-04 | End: 2022-04-04

## 2022-04-04 RX ADMIN — OXYCODONE HYDROCHLORIDE 5 MG: 5 TABLET ORAL at 08:41

## 2022-04-04 NOTE — ED PROVIDER NOTES
History  Chief Complaint   Patient presents with    Knee Pain     pt had R knee replacement surgery 3/9/22, yesterday she fell in her home, on carpet and landed on same knee  no meds taken for pain  Pt states she did not hit her head and had no LOC     47-year-old female with recent total right knee arthroplasty presenting for evaluation of increasing right knee pain after a fall  Patient states yesterday she fell while trying to reach something and landed on her right knee  Patient did not hit her head or lose consciousness  She notes increasing pain to her right knee and difficulty with ambulation  Patient states previously she was making progress and ambulating well with a walker  Patient denies other trauma or injury to her fall  She did not take anything for pain for the last 2 days  Patient states she is supposed to be taking oxycodone but lost the last 4 pills  Patient denies fever, upper respiratory symptoms, chest pain, shortness of breath, nausea, vomiting, abdominal pain, leg swelling  Knee Pain  Associated symptoms: no fever        Prior to Admission Medications   Prescriptions Last Dose Informant Patient Reported? Taking? Calcium Ascorbate (VITAMIN C) 500 mg tablet   No No   Sig: Take 1 tablet (500 mg total) by mouth daily   LORazepam (ATIVAN) 0 5 mg tablet   No No   Sig: Take 1 tablet (0 5 mg total) by mouth 2 (two) times a day as needed for anxiety for up to 10 days   PARoxetine (PAXIL) 30 mg tablet   No No   Sig: Take 2 tablets (60 mg total) by mouth daily   QUEtiapine (SEROquel) 25 mg tablet   No No   Sig: TAKE 2 TABLETS (50 MG TOTAL) BY MOUTH DAILY AT BEDTIME   Valbenazine Tosylate (Ingrezza) 80 MG CAPS   No No   Sig: Take 80 mg by mouth daily   acetaminophen (TYLENOL) 325 mg tablet   Yes No   Sig: Take 975 mg by mouth every 8 (eight) hours   aspirin (ECOTRIN) 325 mg EC tablet   No No   Sig: Take 1 tablet (325 mg total) by mouth 2 (two) times a day Take with food   Do not take your aspirin 81 mg tablet while taking this  atorvastatin (LIPITOR) 40 mg tablet   No No   Sig: Take 1 tablet (40 mg total) by mouth daily   busPIRone (BUSPAR) 10 mg tablet   Yes No   Sig: Take 20 mg by mouth 3 (three) times a day    calcium carbonate (TUMS) 500 mg chewable tablet   Yes No   Sig: Chew 2 tablets every 8 (eight) hours as needed   ferrous sulfate 324 (65 Fe) mg   No No   Sig: TAKE 1 TABLET (324 MG TOTAL) BY MOUTH IN THE MORNING   folic acid (FOLVITE) 1 mg tablet   No No   Sig: Take 1 tablet (1 mg total) by mouth daily   hydrOXYzine HCL (ATARAX) 50 mg tablet   Yes No   Sig: Take 50 mg by mouth 3 (three) times a day as needed for itching   lithium carbonate (LITHOBID) 300 mg CR tablet   No No   Sig: Take 1 tablet (300 mg total) by mouth daily at bedtime   pantoprazole (PROTONIX) 20 mg tablet   No No   Sig: TAKE 1 TABLET (20 MG TOTAL) BY MOUTH DAILY IN THE EARLY MORNING   polyethylene glycol (MIRALAX) 17 g packet   Yes No   Sig: Take 17 g by mouth daily as needed   potassium chloride (K-DUR,KLOR-CON) 10 mEq tablet   Yes No   Sig: Take 30 mEq by mouth in the morning   prazosin (MINIPRESS) 2 mg capsule   No No   Sig: Take 1 capsule (2 mg total) by mouth daily at bedtime   pregabalin (LYRICA) 150 mg capsule   No No   Sig: Take 1 capsule (150 mg total) by mouth 3 (three) times a day   propranolol (INDERAL) 10 mg tablet   No No   Sig: Take 1 tablet (10 mg total) by mouth 2 (two) times a day before breakfast and lunch   senna (SENOKOT) 8 6 MG tablet   Yes No   Sig: Take 1 tablet by mouth daily as needed      Facility-Administered Medications: None       Past Medical History:   Diagnosis Date    Acid reflux     Anxiety     RESOLVED: [id number]    Arthritis     Bipolar 2 disorder (HCC)     FOLLOWS WITH PSYCHIATRIST   CONTINUE LAMOTRIGINE; RESOLVED: [id number]    Depression     Familial tremor     both hands    Fibromyalgia     LAST ASSESSED: [id number]    GERD (gastroesophageal reflux disease)     Hearing aid worn     left ear    [place] (hard of hearing)     left ear    Hyperlipidemia     Hypertension     Left-sided weakness     Lower back pain     Memory loss of unknown cause     long and short term    Migraine     Obesity     Obesity, Class II, BMI 35-39 9     Overactive bladder     Panic attack     Post traumatic stress disorder     Seasonal allergies     Stroke [facility])     questionable stroke 2009    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    CONT ASA 81 MG DAILY; RESOLVED: [id number]    Urinary incontinence     Wears dentures     partial lower / full upper    Wears glasses        Past Surgical History:   Procedure Laterality Date    BACK SURGERY       SECTION      COLONOSCOPY      RESOLVED: [id number]    EAR SURGERY      EGD      HYSTERECTOMY      MYRINGOTOMY W/ TUBES Left     NECK SURGERY  2019    OK CYSTOURETHROSCOPY N/A 2016    Procedure: CYSTOSCOPY, BOTOX INJECTION;  Surgeon: Anu Johnston MD;  [facility];  Service: Gynecology    OK IMPLANT SPINAL NEUROSTIM/ Right 2/10/2021    Procedure: REPLACEMENT IMPLANTABLE PULSE GENERATOR DORSAL SPINAL COLUMN STIMULATOR, RIGHT;  Surgeon: Josy Loaiza MD;  [facility];  Service: Neurosurgery    OK PERCUT IMPLNT [address] Right 2020    Procedure: INSERTION THORACIC DORSAL COLUMN SPINAL CORD STIMULATOR PERCUTANEOUS W IMPLANTABLE PULSE GENERATOR, RIGHT;  Surgeon: Josy Loaiza MD;  [facility];  Service: Neurosurgery    OK TOTAL KNEE ARTHROPLASTY Right 3/9/2022    Procedure: ARTHROPLASTY KNEE TOTAL;  Surgeon: Cameron Oscar DO;  [facility];  Service: Orthopedics    TONSILLECTOMY      TUBAL LIGATION      UPPER GASTROINTESTINAL ENDOSCOPY  2020       Family History   Problem Relation Age of Onset    Colon cancer Mother     Alzheimer's disease Father     Stroke Father     Colon cancer Brother     Bipolar disorder Brother     Breast cancer Maternal Aunt     Colon cancer Maternal Aunt     Heart disease Other     Diabetes Other     Hypertension Other     Seizures Son     Depression Paternal Grandfather     No Known Problems Sister     No Known Problems Brother     Thyroid disease Neg Hx      I have reviewed and agree with the history as documented  E-Cigarette/Vaping    E-Cigarette Use Never User      E-Cigarette/Vaping Substances    Nicotine No     THC No     CBD No     Flavoring No     Other No     Unknown No      Social History     Tobacco Use    Smoking status: Never Smoker    Smokeless tobacco: Never Used   Vaping Use    Vaping Use: Never used   Substance Use Topics    Alcohol use: Not Currently     Comment: 2 x year; being a social drinker as per all scripts     Drug use: No       Review of Systems   Constitutional: Negative for chills and fever  HENT: Negative for congestion, rhinorrhea and sore throat  Eyes: Negative for pain, discharge and visual disturbance  Respiratory: Negative for cough and shortness of breath  Cardiovascular: Negative for chest pain, palpitations and leg swelling  Gastrointestinal: Negative for abdominal pain, diarrhea, nausea and vomiting  Genitourinary: Negative for dysuria, frequency and hematuria  Musculoskeletal: Positive for arthralgias and joint swelling  Negative for myalgias  Skin: Negative for color change and rash  Neurological: Negative for dizziness, weakness, light-headedness, numbness and headaches  Hematological: Does not bruise/bleed easily  Physical Exam  Physical Exam  Vitals and nursing note reviewed  Constitutional:       General: She is not in acute distress  Appearance: Normal appearance  HENT:      Head: Normocephalic and atraumatic  Nose: Nose normal       Mouth/Throat:      Mouth: Mucous membranes are moist    Eyes:      General: No scleral icterus  Extraocular Movements: Extraocular movements intact  Conjunctiva/sclera: Conjunctivae normal       Pupils: Pupils are equal, round, and reactive to light  Cardiovascular:      Rate and Rhythm: Normal rate and regular rhythm  Pulmonary:      Effort: Pulmonary effort is normal  No respiratory distress  Breath sounds: No wheezing, rhonchi or rales  Abdominal:      General: There is no distension  Palpations: Abdomen is soft  Tenderness: There is no abdominal tenderness  There is no guarding or rebound  Musculoskeletal:      Cervical back: Neck supple  Comments: Swelling to the right knee with tenderness to palpation superiorly and decreased range of motion  No erythema or warmth  Superficial abrasion to the anterior knee over the surgical incision  Compartments are soft  No calf tenderness or swelling  Skin:     General: Skin is warm and dry  Capillary Refill: Capillary refill takes less than 2 seconds  Findings: No rash  Neurological:      Mental Status: She is alert and oriented to person, place, and time  Mental status is at baseline        Comments: decreased LLE strength secondary to pain   Psychiatric:         Mood and Affect: Mood normal          Behavior: Behavior normal          Vital Signs  ED Triage Vitals   Temperature Pulse Respirations Blood Pressure SpO2   04/04/22 0825 04/04/22 0825 04/04/22 0825 04/04/22 0825 04/04/22 0825   98 °F (36 7 °C) 90 18 123/74 100 %      Temp Source Heart Rate Source Patient Position - Orthostatic VS BP Location FiO2 (%)   04/04/22 0825 04/04/22 0825 04/04/22 0825 04/04/22 0825 --   Oral Monitor Sitting Left arm       Pain Score       04/04/22 0841       9           Vitals:    04/04/22 0825   BP: 123/74   Pulse: 90   Patient Position - Orthostatic VS: Sitting         Visual Acuity      ED Medications  Medications   oxyCODONE (ROXICODONE) IR tablet 5 mg (5 mg Oral Given 4/4/22 0841)       Diagnostic Studies  Results Reviewed     None                 XR knee 4+ views Right injury   ED Interpretation by Crystal Carmona MD (04/04 3300)   No acute fracture      Final Result by Niru Zapata MD (04/04 1013)      Anterior soft tissue swelling  Otherwise unremarkable appearance of the right knee following total joint replacement  Workstation performed: [id number]                    Procedures  Procedures         ED Course                               SBIRT 22yo+      Most Recent Value   SBIRT (23 yo +)    In order to provide better care to our patients, we are screening all of our patients for alcohol and drug use  Would it be okay to ask you these screening questions? Yes Filed at: 04/04/2022 0830   Initial Alcohol Screen: US AUDIT-C     1  How often do you have a drink containing alcohol? 0 Filed at: 04/04/2022 0830   2  How many drinks containing alcohol do you have on a typical day you are drinking? 0 Filed at: 04/04/2022 0830   3a  Male UNDER 65: How often do you have five or more drinks on one occasion? 0 Filed at: 04/04/2022 0830   3b  FEMALE Any Age, or MALE 65+: How often do you have 4 or more drinks on one occassion? 0 Filed at: 04/04/2022 0830   Audit-C Score 0 Filed at: 04/04/2022 0830   CAMRON: How many times in the past year have you    Used an illegal drug or used a prescription medication for non-medical reasons? Never Filed at: 04/04/2022 0830                    MDM  Number of Diagnoses or Management Options  Acute pain of right knee  Diagnosis management comments: 22-year-old female with recent right total knee arthroplasty presenting for evaluation of right knee pain after a fall yesterday  No evidence of septic arthritis on my evaluation, but the patient does have anterior and prepatellar swelling  Limited range of motion, though unclear what her baseline range of motion is after her surgical intervention    No concern for DVT or compartment syndrome on my exam   X-rays were obtained which showed no acute fracture, dislocation, or hardware malalignment per my interpretation  Patient given a dose of oxycodone here  Patient is able to ambulate in the ED with a walker  Patient counseled on supportive care at home including Ace bandage, elevation, ice, and analgesia  Patient advised to apply antibiotic ointment anteriorly to her abrasion  Strongly encouraged her to call her orthopedic surgeon today  Return precautions discussed  Patient in agreement and understanding of these instructions  Disposition  Final diagnoses:   Acute pain of right knee     Time reflects when diagnosis was documented in both MDM as applicable and the Disposition within this note     Time User Action Codes Description Comment    4/4/2022  9:34 AM Maru Valderrama Add [[id number]] Acute pain of right knee       ED Disposition     ED Disposition Condition Date/Time Comment    Discharge Stable Mon Apr 4, 2022  9:34 AM Saamntha Rodriguez discharge to home/self care  Follow-up Information     Follow up With Specialties Details Why [address] [place] [address], DO Orthopedic Surgery Schedule an appointment as soon as possible for a visit   [address] [phone] [facility]            Discharge Medication List as of 4/4/2022  9:35 AM      CONTINUE these medications which have NOT CHANGED    Details   acetaminophen (TYLENOL) 325 mg tablet Take 975 mg by mouth every 8 (eight) hours, Historical Med      aspirin (ECOTRIN) 325 mg EC tablet Take 1 tablet (325 mg total) by mouth 2 (two) times a day Take with food   Do not take your aspirin 81 mg tablet while taking this , Starting Thu 3/24/2022, Normal      atorvastatin (LIPITOR) 40 mg tablet Take 1 tablet (40 mg total) by mouth daily, Starting Tue 3/29/2022, Until Thu 4/28/2022, Normal      busPIRone (BUSPAR) 10 mg tablet Take 20 mg by mouth 3 (three) times a day , Starting Mon 11/8/2021, Historical Med      Calcium Ascorbate (VITAMIN C) 500 mg tablet Take 1 tablet (500 mg total) by mouth daily, Starting Mon 1/3/2022, Normal      calcium carbonate (TUMS) 500 mg chewable tablet Chew 2 tablets every 8 (eight) hours as needed, Historical Med      ferrous sulfate 324 (65 Fe) mg TAKE 1 TABLET (324 MG TOTAL) BY MOUTH IN THE MORNING, Starting Wed 3/0/2602, Normal      folic acid (FOLVITE) 1 mg tablet Take 1 tablet (1 mg total) by mouth daily, Starting Mon 1/3/2022, Normal      hydrOXYzine HCL (ATARAX) 50 mg tablet Take 50 mg by mouth 3 (three) times a day as needed for itching, Historical Med      lithium carbonate (LITHOBID) 300 mg CR tablet Take 1 tablet (300 mg total) by mouth daily at bedtime, Starting Thu 8/5/2021, Normal      LORazepam (ATIVAN) 0 5 mg tablet Take 1 tablet (0 5 mg total) by mouth 2 (two) times a day as needed for anxiety for up to 10 days, Starting Thu 3/17/2022, Until Sun 3/27/2022 at 2359, Print      pantoprazole (PROTONIX) 20 mg tablet TAKE 1 TABLET (20 MG TOTAL) BY MOUTH DAILY IN THE EARLY MORNING, Starting Tue 3/8/2022, Normal      PARoxetine (PAXIL) 30 mg tablet Take 2 tablets (60 mg total) by mouth daily, Starting Fri 8/6/2021, Normal      polyethylene glycol (MIRALAX) 17 g packet Take 17 g by mouth daily as needed, Historical Med      potassium chloride (K-DUR,KLOR-CON) 10 mEq tablet Take 30 mEq by mouth in the morning, Historical Med      prazosin (MINIPRESS) 2 mg capsule Take 1 capsule (2 mg total) by mouth daily at bedtime, Starting Thu 3/3/2022, Until Sat 4/2/2022, Normal      pregabalin (LYRICA) 150 mg capsule Take 1 capsule (150 mg total) by mouth 3 (three) times a day, Starting Tue 3/8/2022, Until Sat 5/7/2022, No Print      propranolol (INDERAL) 10 mg tablet Take 1 tablet (10 mg total) by mouth 2 (two) times a day before breakfast and lunch, Starting Thu 3/17/2022, No Print      QUEtiapine (SEROquel) 25 mg tablet TAKE 2 TABLETS (50 MG TOTAL) BY MOUTH DAILY AT BEDTIME, Starting Mon 3/28/2022, Normal      senna (SENOKOT) 8 6 MG tablet Take 1 tablet by mouth daily as needed, Historical Med      Valbenazine Tosylate (Ingrezza) 80 MG CAPS Take 80 mg by mouth daily, Starting Tue 9/21/2021, Normal             No discharge procedures on file      PDMP Review       Value Time User    PDMP Reviewed  Yes 3/24/2022 10:41 AM Js Cabrales PA-C          ED Provider  Electronically Signed by           Hayley Sabillon MD  04/04/22 1050

## 2022-04-05 ENCOUNTER — PATIENT OUTREACH (OUTPATIENT)
Dept: INTERNAL MEDICINE CLINIC | Facility: CLINIC | Age: 57
End: 2022-04-05

## 2022-04-05 ENCOUNTER — TELEPHONE (OUTPATIENT)
Dept: INTERNAL MEDICINE CLINIC | Facility: CLINIC | Age: 57
End: 2022-04-05

## 2022-04-05 ENCOUNTER — OFFICE VISIT (OUTPATIENT)
Dept: INTERNAL MEDICINE CLINIC | Facility: CLINIC | Age: 57
End: 2022-04-05

## 2022-04-05 VITALS
HEART RATE: 75 BPM | OXYGEN SATURATION: 96 % | SYSTOLIC BLOOD PRESSURE: 115 MMHG | DIASTOLIC BLOOD PRESSURE: 82 MMHG | TEMPERATURE: 98.3 F | BODY MASS INDEX: 35.74 KG/M2 | WEIGHT: 183 LBS

## 2022-04-05 DIAGNOSIS — E87.6 HYPOKALEMIA: ICD-10-CM

## 2022-04-05 DIAGNOSIS — Z96.651 STATUS POST TOTAL KNEE REPLACEMENT, RIGHT: Primary | ICD-10-CM

## 2022-04-05 DIAGNOSIS — F43.10 POST TRAUMATIC STRESS DISORDER: ICD-10-CM

## 2022-04-05 DIAGNOSIS — Z00.8 MEDICAL CLEARANCE FOR PSYCHIATRIC ADMISSION: ICD-10-CM

## 2022-04-05 DIAGNOSIS — R11.0 NAUSEA: ICD-10-CM

## 2022-04-05 PROCEDURE — 99495 TRANSJ CARE MGMT MOD F2F 14D: CPT | Performed by: INTERNAL MEDICINE

## 2022-04-05 RX ORDER — PAROXETINE 30 MG/1
60 TABLET, FILM COATED ORAL DAILY
Qty: 180 TABLET | Refills: 3 | Status: SHIPPED | OUTPATIENT
Start: 2022-04-05 | End: 2022-07-05

## 2022-04-05 RX ORDER — PRAZOSIN HYDROCHLORIDE 2 MG/1
2 CAPSULE ORAL
Qty: 30 CAPSULE | Refills: 0 | Status: SHIPPED | OUTPATIENT
Start: 2022-04-05 | End: 2022-06-28

## 2022-04-05 RX ORDER — ASPIRIN 325 MG
325 TABLET, DELAYED RELEASE (ENTERIC COATED) ORAL 2 TIMES DAILY
Qty: 56 TABLET | Refills: 0 | Status: SHIPPED | OUTPATIENT
Start: 2022-04-05 | End: 2022-04-14

## 2022-04-05 RX ORDER — QUETIAPINE FUMARATE 25 MG/1
50 TABLET, FILM COATED ORAL
Qty: 180 TABLET | Refills: 3 | Status: SHIPPED | OUTPATIENT
Start: 2022-04-05 | End: 2022-07-04

## 2022-04-05 RX ORDER — PANTOPRAZOLE SODIUM 20 MG/1
20 TABLET, DELAYED RELEASE ORAL
Qty: 90 TABLET | Refills: 3 | Status: SHIPPED | OUTPATIENT
Start: 2022-04-05 | End: 2022-06-20 | Stop reason: SDUPTHER

## 2022-04-05 NOTE — PROGRESS NOTES
Outpatient Care Management Note:    Patient had ED visit on 4/4/22 for a fall at home on carpet  Patient is at PCP office for transition of care appointment  , [place] and I met with patient after her appointment  Patient is with her paid caregiver/son in law  Patient using rollator to ambulate  Daughter drove them to appointment today and is in the car with children  Patient reports she is doing well at this time and is getting in home physical therapy with the goal to transition to outpatient physical therapy  Patient encouraged to follow up with ortho office  Patient reports she is following up with mental health routinely  Patient reports she has not gotten an ICM worker yet and is more focused on doing physically therapy at this time  Patient feels she is managing ok at home at this time with the help of her family  Patient lives with daughter, son in law and grandchildren  Patient and son in law do not have any further questions, concerns, or other needs at this time  Pt has my contact # and PCP office # if needed  Pt is agreeable for further outreach  Please see physician note for additional information

## 2022-04-05 NOTE — TELEPHONE ENCOUNTER
Patient called in asking if the provider approved her request for Oxycodone  Patient stated she just had surgery and is in pain  Please advise and reach out to patient

## 2022-04-05 NOTE — PROGRESS NOTES
HIRAM and Dejan Resendez RN/CM met briefly with pt after her PCP visit  Pt had come with her dgt who drove ,Son in law ( paid PA Waiver Caregiver) and their 3 small children  Pt is s/p her knee surgery, a recent ED visit due to a fall  Pt does relate she is doing better at this time  She is getting in home PT and will eventually progress to OP PT  She shares she is active with her OP MH at [facility] and [facility]  She wants to wait  before she pursues her ICM so she can focus on her physical rehab at this time  Support offered  CM Teams to remain available to assist as indicted

## 2022-04-05 NOTE — PROGRESS NOTES
INTERNAL MEDICINE FOLLOW-UP OFFICE VISIT  [facility]  [address] [place] [address]    NAME: Meghna De La Torre  AGE: 60 y o  SEX: female    DATE OF ENCOUNTER: 4/5/2022    Assessment and Plan     TCM visit  - Patient was admitted on 03/09/2022 for right knee arthroplasty by Ortho, surgery was successful  was discharged to [facility] on 03/18 and discharged home from rehab on 03/25  ortho recommended patient transition from Lovenox to aspirin 325 mg b i d  Upon discharge from [facility] for total of 6 weeks postop 5/6/22 and patient understands  - Blood pressure was soft during hospitalization so they discontinued her Norvasc and propranolol was decreased to 10 mg daily from 10 mg b i d  Today patient reports taking propranolol 10 mg b i d  On her blood pressure is 115/82, heart rate 75  Patient denies headaches, lightheadedness, chest pain, shortness of breath, palpitation, leg swelling   - Patient tripped and fell on 04/04/22 and was taken to the ED, she states that she was trying to get something from the shelf when she tripped on fell, she did not have her walker close to her, denies head strike or loss of consciousness, at the ED, patient had a knee x-ray that showed no fracture  - today patient has no complaint apart from right knee pain at the site of incision from fall, incision site was clean and dry  Encourage patient to apply eyes and also Tylenol/ibuprofen p r n   -  Patient denies chest pain, shortness of breath, palpitation, headaches, lightheadedness, abdominal pain, N/V/C/D, numbness tingling sensation of extremity patient looks stable not in any obvious distress on room air   - patient advised to follow-up with appointment with Orthopedics     - aspirin (ECOTRIN) 325 mg EC tablet; Take 1 tablet (325 mg total) by mouth 2 (two) times a day Take with food  Do not take your aspirin 81 mg tablet while taking this  Dispense: 56 tablet; Refill: 0    3  GERD  - pantoprazole (PROTONIX) 20 mg tablet; Take 1 tablet (20 mg total) by mouth daily in the early morning  Dispense: 90 tablet; Refill: 3    4  Post traumatic stress disorder    - prazosin (MINIPRESS) 2 mg capsule; Take 1 capsule (2 mg total) by mouth daily at bedtime  Dispense: 30 capsule; Refill: 0    5  Hypokalemia patient has had long-time hypokalemia  Likely secondary to Seroquel most recent magnesium level was normal 2 0  Potassium prior to discharge on 03/25 was 3 1  She is currently on 30 mEq of potassium daily, previously on 20 mEq prior to recent hospitalization  Will workup cause of hypokalemia    - Potassium, urine, random  - Aldosterone/Renin Ratio; Future    Orders Placed This Encounter   Procedures    Potassium, urine, random    Aldosterone/Renin Ratio       - Counseling Documentation: patient was counseled regarding: instructions for management, risk factor reductions, impressions, risks and benefits of treatment options and importance of compliance with treatment    Chief Complaint     Chief Complaint   Patient presents with    Transition of Care Management       History of Present Illness     HPI  Samantha QUINONEZ Vero Crook is a 60 y o  female with a past medical history of severe bilateral knee osteoarthritis s/p arthroplasty on 03/2022, chronic pain syndrome, opioid dependence, tardive dyskinesia, hypertension, hyperlipidemia, and prior CVA presents for TCM visit  Patient was admitted on 03/09/2022 for right knee arthroplasty by Ortho, surgery was successful  PT/OT evaluated patient and recommended short-term rehab, on 03/18 she was discharged to [facility] and discharged home on 03/25  Prior to discharge from the hospital ortho recommended patient transition from Lovenox to aspirin 325 mg b i d  Upon discharge from [facility] for total of 6 weeks postop    Blood pressure was soft in the hospital so they discontinued her Norvasc and propranolol was decreased to 10 mg daily from 10 mg b i d  Patient tripped and fell on 04/04/22 and was taken to the ED, she states that she was trying to get something from the shelf when she tripped on fell, she did not have her walker close to her, denies head strike or loss of consciousness, at the ED, patient had a knee x-ray that showed no fracture  Today patient complained of pain on her right knee at the site of surgery, states that pain was improved on 2 she fell yesterday  Examination of the site looks clean and dry  Patient was instructed to apply ice and take over-the-counter Tylenol/ibuprofen as needed and she verbalized understanding  Blood pressure today is 115/82, heart rate is 75  Patient denies chest pain, shortness of breath, palpitation, headaches, lightheadedness, abdominal pain, N/V/C/D, numbness tingling sensation of extremity patient looks stable not in any obvious distress on room air  The following portions of the patient's history were reviewed and updated as appropriate: allergies, current medications, past family history, past medical history, past social history, past surgical history and problem list     Review of Systems     Review of Systems   Constitutional: Negative for chills and fever  HENT: Negative for ear pain and sore throat  Eyes: Negative for pain and visual disturbance  Respiratory: Negative for cough and shortness of breath  Cardiovascular: Negative for chest pain and palpitations  Gastrointestinal: Negative for abdominal pain and vomiting  Genitourinary: Negative for dysuria and hematuria  Musculoskeletal: Positive for arthralgias  Negative for back pain  Skin: Negative for color change and rash  Neurological: Negative for seizures and syncope  All other systems reviewed and are negative        Active Problem List     Patient Active Problem List   Diagnosis    Right knee pain    Chronic pain disorder    Lumbar radiculopathy    Lumbar spondylosis    Fibromyalgia    Lumbar disc disease with radiculopathy    Spinal stenosis of lumbar region with neurogenic claudication    Pain in joint, shoulder region    Intractable low back pain    Bilateral hip pain    Bipolar II disorder (HCC)    Cognitive disorder    Esophageal reflux    Fatigue    Female pelvic pain    Generalized anxiety disorder    Essential hypertension    History of hypokalemia    Insomnia    Major depressive disorder, recurrent episode, moderate degree (HCC)    Migraine    Opioid dependence (HCC)    Osteoarthritis of both hips    Osteoarthritis of knee    Overactive bladder    Left hip pain    Panic disorder without agoraphobia    Post traumatic stress disorder    Primary localized osteoarthritis of both knees    Recent unexplained weight loss    Sacroiliitis (HCC)    Tremor    Urinary incontinence    Vitamin D deficiency    Post laminectomy syndrome    Encounter for long-term use of opiate analgesic    Family history of colorectal cancer    Complaint related to dreams    MIMI (obstructive sleep apnea)    Tardive dyskinesia    Primary osteoarthritis of both knees    Patellofemoral disorder of both knees    Rash    Superficial bacterial infection of skin    Scar of back    Impaired skin integrity associated with surgical incision    Status post insertion of spinal cord stimulator    Ambulatory dysfunction    Dysphagia    Arthritis of right knee    Multiple closed fractures of metatarsal bone of right foot    Chronic back pain    Nausea    Encephalopathy    History of CVA (cerebrovascular accident)    Medical clearance for psychiatric admission    Mixed hyperlipidemia    Leukopenia    Preoperative evaluation to rule out surgical contraindication    CVA (cerebral vascular accident) ([address] )    S/P total knee arthroplasty, right    Constipation    Hypokalemia       Objective     /82 (BP Location: Left arm, Patient Position: Sitting, Cuff Size: Large)   Pulse 75   Temp 98 3 °F (36 8 °C) (Temporal)   Wt 83 kg (183 lb)   SpO2 96%   BMI 35 74 kg/m²     Physical Exam  Vitals and nursing note reviewed  Constitutional:       General: She is not in acute distress  Appearance: She is well-developed  HENT:      Head: Normocephalic and atraumatic  Eyes:      Conjunctiva/sclera: Conjunctivae normal    Cardiovascular:      Rate and Rhythm: Normal rate and regular rhythm  Heart sounds: No murmur heard  Pulmonary:      Effort: Pulmonary effort is normal  No respiratory distress  Breath sounds: Normal breath sounds  Abdominal:      Palpations: Abdomen is soft  Tenderness: There is no abdominal tenderness  Musculoskeletal:         General: Swelling and tenderness present  Normal range of motion  Cervical back: Neck supple  Comments: incision site clean and dry   Skin:     General: Skin is warm and dry  Capillary Refill: Capillary refill takes less than 2 seconds  Neurological:      Mental Status: She is alert         Pertinent Laboratory/Diagnostic Studies:  CBC:   Lab Results   Component Value Date/Time    WBC 5 50 03/23/2022 07:09 AM    WBC 4 50 10/26/2015 11:23 AM    RBC 3 49 (L) 03/23/2022 07:09 AM    RBC 4 80 10/26/2015 11:23 AM    HGB 10 4 (L) 03/23/2022 07:09 AM    HGB 14 9 10/26/2015 11:23 AM    HCT 31 1 (L) 03/23/2022 07:09 AM    HCT 41 9 10/26/2015 11:23 AM    MCV 89 03/23/2022 07:09 AM    MCV 87 10/26/2015 11:23 AM    MCH 29 9 03/23/2022 07:09 AM    MCH 31 0 10/26/2015 11:23 AM    MCHC 33 6 03/23/2022 07:09 AM    MCHC 35 6 10/26/2015 11:23 AM    RDW 16 6 (H) 03/23/2022 07:09 AM    RDW 12 9 10/26/2015 11:23 AM    MPV 8 1 (L) 03/23/2022 07:09 AM    MPV 9 5 10/26/2015 11:23 AM     03/23/2022 07:09 AM     10/26/2015 11:23 AM    NRBC 0 03/12/2022 04:56 AM    NEUTOPHILPCT 68 (H) 03/22/2022 07:40 AM    NEUTOPHILPCT 54 10/26/2015 11:23 AM    LYMPHOPCT 18 (L) 03/22/2022 07:40 AM    LYMPHOPCT 36 10/26/2015 11:23 AM    MONOPCT 6 03/22/2022 07:40 AM MONOPCT 8 10/26/2015 11:23 AM    EOSPCT 7 (H) 03/22/2022 07:40 AM    EOSPCT 1 10/26/2015 11:23 AM    BASOPCT 1 03/22/2022 07:40 AM    BASOPCT 1 10/26/2015 11:23 AM    NEUTROABS 4 70 03/22/2022 07:40 AM    NEUTROABS 2 43 10/26/2015 11:23 AM    LYMPHSABS 1 30 03/22/2022 07:40 AM    LYMPHSABS 1 62 10/26/2015 11:23 AM    MONOSABS 0 40 03/22/2022 07:40 AM    MONOSABS 0 36 10/26/2015 11:23 AM    EOSABS 0 50 (H) 03/22/2022 07:40 AM    EOSABS 0 05 10/26/2015 11:23 AM     Chemistry Profile:   Lab Results   Component Value Date/Time     07/27/2015 12:06 PM    K 3 1 (L) 03/25/2022 05:00 AM    K 3 6 07/27/2015 12:06 PM     03/25/2022 05:00 AM    CL 99 (L) 07/27/2015 12:06 PM    CO2 28 03/25/2022 05:00 AM    CO2 31 07/27/2015 12:06 PM    ANIONGAP 7 07/27/2015 12:06 PM    BUN 8 03/25/2022 05:00 AM    BUN 6 07/27/2015 12:06 PM    CREATININE 0 70 03/25/2022 05:00 AM    CREATININE 0 75 07/27/2015 12:06 PM    GLUC 88 03/25/2022 05:00 AM    GLUF 88 03/23/2022 07:09 AM    GLUCOSE 96 07/27/2015 12:06 PM    CALCIUM 8 5 03/25/2022 05:00 AM    CALCIUM 9 1 07/27/2015 12:06 PM    CORRECTEDCA 9 0 03/23/2022 07:09 AM    MG 2 0 03/16/2022 05:38 AM    PHOS 3 6 07/05/2021 12:17 PM    AST 28 03/23/2022 07:09 AM    AST 9 02/22/2014 06:15 AM    ALT 20 03/23/2022 07:09 AM    ALT 18 02/22/2014 06:15 AM    ALKPHOS 88 03/23/2022 07:09 AM    ALKPHOS 96 02/22/2014 06:15 AM    PROT 6 9 02/22/2014 06:15 AM    BILITOT 0 51 02/22/2014 06:15 AM    EGFR 95 03/25/2022 05:00 AM         Current Medications     Current Outpatient Medications:     aspirin (ECOTRIN) 325 mg EC tablet, Take 1 tablet (325 mg total) by mouth 2 (two) times a day Take with food   Do not take your aspirin 81 mg tablet while taking this , Disp: 56 tablet, Rfl: 0    atorvastatin (LIPITOR) 40 mg tablet, Take 1 tablet (40 mg total) by mouth daily, Disp: 30 tablet, Rfl: 0    busPIRone (BUSPAR) 10 mg tablet, Take 20 mg by mouth 3 (three) times a day , Disp: , Rfl:     Calcium Ascorbate (VITAMIN C) 500 mg tablet, Take 1 tablet (500 mg total) by mouth daily, Disp: 30 tablet, Rfl: 1    calcium carbonate (TUMS) 500 mg chewable tablet, Chew 2 tablets every 8 (eight) hours as needed, Disp: , Rfl:     ferrous sulfate 324 (65 Fe) mg, TAKE 1 TABLET (324 MG TOTAL) BY MOUTH IN THE MORNING, Disp: 90 tablet, Rfl: 1    folic acid (FOLVITE) 1 mg tablet, Take 1 tablet (1 mg total) by mouth daily, Disp: 30 tablet, Rfl: 1    hydrOXYzine HCL (ATARAX) 50 mg tablet, Take 50 mg by mouth 3 (three) times a day as needed for itching, Disp: , Rfl:     lithium carbonate (LITHOBID) 300 mg CR tablet, Take 1 tablet (300 mg total) by mouth daily at bedtime, Disp: 30 tablet, Rfl: 0    LORazepam (ATIVAN) 0 5 mg tablet, Take 1 tablet (0 5 mg total) by mouth 2 (two) times a day as needed for anxiety for up to 10 days, Disp: 10 tablet, Rfl: 0    pantoprazole (PROTONIX) 20 mg tablet, Take 1 tablet (20 mg total) by mouth daily in the early morning, Disp: 90 tablet, Rfl: 3    PARoxetine (PAXIL) 30 mg tablet, Take 2 tablets (60 mg total) by mouth daily, Disp: 180 tablet, Rfl: 3    potassium chloride (K-DUR,KLOR-CON) 10 mEq tablet, Take 30 mEq by mouth in the morning, Disp: , Rfl:     prazosin (MINIPRESS) 2 mg capsule, Take 1 capsule (2 mg total) by mouth daily at bedtime, Disp: 30 capsule, Rfl: 0    pregabalin (LYRICA) 150 mg capsule, Take 1 capsule (150 mg total) by mouth 3 (three) times a day, Disp: 120 capsule, Rfl: 1    propranolol (INDERAL) 10 mg tablet, Take 1 tablet (10 mg total) by mouth 2 (two) times a day before breakfast and lunch, Disp: , Rfl: 0    QUEtiapine (SEROquel) 25 mg tablet, Take 2 tablets (50 mg total) by mouth daily at bedtime, Disp: 180 tablet, Rfl: 3    senna (SENOKOT) 8 6 MG tablet, Take 1 tablet by mouth daily as needed, Disp: , Rfl:     Valbenazine Tosylate (Ingrezza) 80 MG CAPS, Take 80 mg by mouth daily, Disp: 90 capsule, Rfl: 0    acetaminophen (TYLENOL) 325 mg tablet, Take 975 mg by mouth every 8 (eight) hours (Patient not taking: Reported on 4/5/2022 ), Disp: , Rfl:     Health Maintenance     Health Maintenance   Topic Date Due    Medicare Annual Wellness Visit (AWV)  Never done    Cervical Cancer Screening  Never done    Osteoporosis Screening  Never done    Colorectal Cancer Screening  Never done    Breast Cancer Screening: Mammogram  10/10/2014    PT PLAN OF CARE  04/06/2022    BMI: Followup Plan  06/23/2022    Influenza Vaccine (1) 06/30/2022 (Originally 9/1/2021)    COVID-19 Vaccine (1) 07/05/2022 (Originally 12/26/1970)    BMI: Adult  04/05/2023    DTaP,Tdap,and Td Vaccines (2 - Td or Tdap) 07/25/2024    HIV Screening  Completed    Hepatitis C Screening  Completed    Pneumococcal Vaccine: Pediatrics (0 to 5 Years) and At-Risk Patients (6 to 59 Years)  Aged Out    HIB Vaccine  Aged Out    Hepatitis B Vaccine  Aged Out    IPV Vaccine  Aged Out    Hepatitis A Vaccine  Aged Out    Meningococcal ACWY Vaccine  Aged Out    HPV Vaccine  Aged [employer] History   Administered Date(s) Administered    Influenza, seasonal, injectable 10/02/2013, 11/20/2014, 10/26/2015    Tdap 07/25/2014       Triny RIVAS    Internal Medicine PGY-3  4/5/2022 12:45 PM

## 2022-04-05 NOTE — TELEPHONE ENCOUNTER
Patient called to check on the status of her medication request for Oxycodone   I told her to check with the pharmacy later this evening or in the morning

## 2022-04-05 NOTE — TELEPHONE ENCOUNTER
I called and spoke with patient and asked her when she requested Oxycodone  She said at her appointment this morning  I advised her I do not see anything noted in the chart about this  I advised her that if this is surgery related pain then she needs to contact the surgeon to discuss as they will need to provide her with medication if they feel it is appropriate

## 2022-04-06 ENCOUNTER — TELEPHONE (OUTPATIENT)
Dept: INTERNAL MEDICINE CLINIC | Facility: CLINIC | Age: 57
End: 2022-04-06

## 2022-04-06 NOTE — TELEPHONE ENCOUNTER
Left message in VM     1)  Patient put in wait list for Medicare Wellness Visit    2)  Does the patient want the AVS mailed? 3)  Was labs done?     Patient seen by Dr Zack Moore 4/5/22

## 2022-04-06 NOTE — TELEPHONE ENCOUNTER
Thanks for reaching out  We discussed and agreed on  applying ice, Ibuprofen and tylenol  She has an upcomming appointment with ortho and can also call them to discuss  Thanks

## 2022-04-11 DIAGNOSIS — Z96.651 STATUS POST TOTAL KNEE REPLACEMENT, RIGHT: Primary | ICD-10-CM

## 2022-04-11 RX ORDER — OXYCODONE HYDROCHLORIDE 5 MG/1
5 TABLET ORAL 2 TIMES DAILY PRN
Qty: 14 TABLET | Refills: 0 | Status: SHIPPED | OUTPATIENT
Start: 2022-04-11 | End: 2022-06-03 | Stop reason: ALTCHOICE

## 2022-04-11 RX ORDER — NALOXONE HYDROCHLORIDE 4 MG/.1ML
SPRAY NASAL
Qty: 1 EACH | Refills: 1 | Status: SHIPPED | OUTPATIENT
Start: 2022-04-11

## 2022-04-11 NOTE — TELEPHONE ENCOUNTER
Patient called to request her Oxycodone  I informed Patient she needs to contact Ortho for any meds that concern pain from the knee Surgery I gave her Ortho Number [phone]

## 2022-04-11 NOTE — TELEPHONE ENCOUNTER
Patient sees Dr Mayte Rey  Patient called to request refill on Oxycodone, she has none left  Uses [facility] in [place] on file  She would like a call back once filled       [phone]

## 2022-04-21 ENCOUNTER — TELEPHONE (OUTPATIENT)
Dept: OBGYN CLINIC | Facility: HOSPITAL | Age: 57
End: 2022-04-21

## 2022-04-21 ENCOUNTER — OFFICE VISIT (OUTPATIENT)
Dept: OBGYN CLINIC | Facility: MEDICAL CENTER | Age: 57
End: 2022-04-21

## 2022-04-21 ENCOUNTER — APPOINTMENT (OUTPATIENT)
Dept: RADIOLOGY | Facility: MEDICAL CENTER | Age: 57
End: 2022-04-21
Payer: MEDICARE

## 2022-04-21 VITALS
HEIGHT: 60 IN | SYSTOLIC BLOOD PRESSURE: 132 MMHG | WEIGHT: 184 LBS | BODY MASS INDEX: 36.12 KG/M2 | DIASTOLIC BLOOD PRESSURE: 78 MMHG | HEART RATE: 81 BPM

## 2022-04-21 DIAGNOSIS — G89.4 CHRONIC PAIN DISORDER: ICD-10-CM

## 2022-04-21 DIAGNOSIS — Z96.651 STATUS POST TOTAL KNEE REPLACEMENT, RIGHT: Primary | ICD-10-CM

## 2022-04-21 DIAGNOSIS — M89.8X2 PAIN OF LEFT HUMERUS: ICD-10-CM

## 2022-04-21 PROCEDURE — 73060 X-RAY EXAM OF HUMERUS: CPT

## 2022-04-21 PROCEDURE — 99024 POSTOP FOLLOW-UP VISIT: CPT | Performed by: PHYSICIAN ASSISTANT

## 2022-04-21 RX ORDER — OXYCODONE HYDROCHLORIDE 5 MG/1
5 TABLET ORAL DAILY
Qty: 7 TABLET | Refills: 0 | Status: SHIPPED | OUTPATIENT
Start: 2022-04-21 | End: 2022-06-03 | Stop reason: ALTCHOICE

## 2022-04-21 RX ORDER — ACETAMINOPHEN 500 MG
1000 TABLET ORAL 3 TIMES DAILY
Qty: 180 TABLET | Refills: 0 | Status: SHIPPED | OUTPATIENT
Start: 2022-04-21 | End: 2022-06-06

## 2022-04-21 NOTE — TELEPHONE ENCOUNTER
Not sure if this is appropriate for refill   Looks like it may have been refilled by Geriatric Medicine

## 2022-04-21 NOTE — PROGRESS NOTES
Assessment/Plan:  1  Status post total knee replacement, right    2  Pain of left humerus      Orders Placed This Encounter   Procedures    XR humerus left    Ambulatory Referral to Physical Therapy       · Patient with right knee abrasion and left shoulder contusion after fall  She can continue WBAT and activity as tolerated LUE  · Dressing supplies provided for right knee abrasion  Advised to keep the area clean and covered  · Continue PT  Discussed with patient the importance of attending outpatient PT to work on her motion  New referral provided  · Pain control prn- tylenol and oxycodone  Unable to prescribe NSAID due to lithium  Patient is aware that I will not prescribe any more oxycodone after this script  She will call her PCP regarding lyrica refill  · Patient should call ahead for abx prior to dental appts  · Patient would like to discuss L TKA at her next visit  Return in about 4 weeks (around 5/19/2022) for R TKA post op 3  I answered all of the patient's questions during the visit and provided education of the patient's condition during the visit  The patient verbalized understanding of the information given and agrees with the plan  This note was dictated using BeanJockey software  It may contain errors including improperly dictated words  Please contact physician directly for any questions  Subjective   Chief Complaint:   Chief Complaint   Patient presents with    Right Knee - Post-op, Follow-up       Samantha Gould is a 60 y o  female who presents for 6 week follow up s/p right TKA on 3/9/22  Patient states she is improving  However she had a fall at the beginning on April where she lost her balance reaching into her cabinet and fell backwards  She did scrape her knee beside her incision  Patient also landed on her left arm and reports persistent pain in her arm  She was evaluted in the ED where right knee x-rays were obtained   Patient is taking oxycodone for pain as well as tylenol  She completed her ASA DVT prophylaxis  She has not been doing PT  States she did not start since her last visit  She is ambulating with a walker  Patient is still living with her daughter  Review of Systems  ROS:    See HPI for musculoskeletal review  All other systems reviewed are negative     History:  Past Medical History:   Diagnosis Date    Acid reflux     Anxiety     RESOLVED: [id number]    Arthritis     Bipolar 2 disorder (HCC)     FOLLOWS WITH PSYCHIATRIST  CONTINUE LAMOTRIGINE; RESOLVED: [id number]    Depression     Familial tremor     both hands    Fibromyalgia     LAST ASSESSED: [id number]    GERD (gastroesophageal reflux disease)     Hearing aid worn     left ear    [place] (hard of hearing)     left ear    Hyperlipidemia     Hypertension     Left-sided weakness     Lower back pain     Memory loss of unknown cause     long and short term    Migraine     Obesity     Obesity, Class II, BMI 35-39 9     Overactive bladder     Panic attack     Post traumatic stress disorder     Seasonal allergies     Stroke [facility])     questionable stroke 2009    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    CONT ASA 81 MG DAILY; RESOLVED: [id number]    Urinary incontinence     Wears dentures     partial lower / full upper    Wears glasses      Past Surgical History:   Procedure Laterality Date    BACK SURGERY       SECTION      COLONOSCOPY      RESOLVED: [id number]    EAR SURGERY      EGD      HYSTERECTOMY  2004    MYRINGOTOMY W/ TUBES Left     NECK SURGERY  2019    GA CYSTOURETHROSCOPY N/A 2016    Procedure: CYSTOSCOPY, BOTOX INJECTION;  Surgeon: Lucille Mercado MD;  Location: [facility];  Service: Gynecology    GA IMPLANT SPINAL NEUROSTIM/ Right 2/10/2021    Procedure: REPLACEMENT IMPLANTABLE PULSE GENERATOR DORSAL SPINAL COLUMN STIMULATOR, RIGHT;  Surgeon: Starla Rogers MD;  Location:  MAIN OR;  Service: Neurosurgery    GA PERCUT IMPLNT NEUROELECT,EPIDURAL Right 7/28/2020    Procedure: INSERTION THORACIC DORSAL COLUMN SPINAL CORD STIMULATOR PERCUTANEOUS W IMPLANTABLE PULSE GENERATOR, RIGHT;  Surgeon: Cristin Gómez MD;  Location: [facility];  Service: Neurosurgery    FL TOTAL KNEE ARTHROPLASTY Right 3/9/2022    Procedure: ARTHROPLASTY KNEE TOTAL;  Surgeon: Wayne Be DO;  Location: [facility];  Service: Orthopedics    TONSILLECTOMY     [address]    UPPER GASTROINTESTINAL ENDOSCOPY  09/2020     Social History   Social History     Substance and Sexual Activity   Alcohol Use Not Currently    Comment: 2 x year; being a social drinker as per all scripts      Social History     Substance and Sexual Activity   Drug Use No     Social History     Tobacco Use   Smoking Status Never Smoker   Smokeless Tobacco Never Used     Family History:   Family History   Problem Relation Age of Onset    Colon cancer Mother     Alzheimer's disease Father     Stroke Father     Colon cancer Brother     Bipolar disorder Brother     Breast cancer Maternal Aunt     Colon cancer Maternal Aunt     Heart disease Other     Diabetes Other     Hypertension Other     Seizures Son     Depression Paternal Grandfather     No Known Problems Sister     No Known Problems Brother     Thyroid disease Neg Hx        Current Outpatient Medications on File Prior to Visit   Medication Sig Dispense Refill    aspirin (ECOTRIN) 325 mg EC tablet TAKE 1 TABLET BY MOUTH TWICE A DAY WITH FOOD STOP ASPIRIN 81 MG WHILE TAKING THIS 56 tablet 3    atorvastatin (LIPITOR) 40 mg tablet TAKE 1 TABLET BY MOUTH EVERY DAY 90 tablet 3    busPIRone (BUSPAR) 10 mg tablet Take 20 mg by mouth 3 (three) times a day       Calcium Ascorbate (VITAMIN C) 500 mg tablet Take 1 tablet (500 mg total) by mouth daily 30 tablet 1    calcium carbonate (TUMS) 500 mg chewable tablet Chew 2 tablets every 8 (eight) hours as needed      ferrous sulfate 324 (65 Fe) mg TAKE 1 TABLET (324 MG TOTAL) BY MOUTH IN THE MORNING 90 tablet 1    folic acid (FOLVITE) 1 mg tablet Take 1 tablet (1 mg total) by mouth daily 30 tablet 1    hydrOXYzine HCL (ATARAX) 50 mg tablet Take 50 mg by mouth 3 (three) times a day as needed for itching      lithium carbonate (LITHOBID) 300 mg CR tablet Take 1 tablet (300 mg total) by mouth daily at bedtime 30 tablet 0    LORazepam (ATIVAN) 0 5 mg tablet Take 1 tablet (0 5 mg total) by mouth 2 (two) times a day as needed for anxiety for up to 10 days 10 tablet 0    naloxone (NARCAN) 4 mg/0 1 mL nasal spray Administer 1 spray into a nostril  If no response after 2-3 minutes, give another dose in the other nostril using a new spray   1 each 1    oxyCODONE (ROXICODONE) 5 immediate release tablet Take 1 tablet (5 mg total) by mouth 2 (two) times a day as needed for severe pain Max Daily Amount: 10 mg 14 tablet 0    pantoprazole (PROTONIX) 20 mg tablet Take 1 tablet (20 mg total) by mouth daily in the early morning 90 tablet 3    PARoxetine (PAXIL) 30 mg tablet Take 2 tablets (60 mg total) by mouth daily 180 tablet 3    potassium chloride (K-DUR,KLOR-CON) 10 mEq tablet Take 30 mEq by mouth in the morning      prazosin (MINIPRESS) 2 mg capsule Take 1 capsule (2 mg total) by mouth daily at bedtime 30 capsule 0    pregabalin (LYRICA) 150 mg capsule Take 1 capsule (150 mg total) by mouth 3 (three) times a day 120 capsule 1    propranolol (INDERAL) 10 mg tablet Take 1 tablet (10 mg total) by mouth 2 (two) times a day before breakfast and lunch  0    QUEtiapine (SEROquel) 25 mg tablet Take 2 tablets (50 mg total) by mouth daily at bedtime 180 tablet 3    senna (SENOKOT) 8 6 MG tablet Take 1 tablet by mouth daily as needed      Valbenazine Tosylate (Ingrezza) 80 MG CAPS Take 80 mg by mouth daily 90 capsule 0    [DISCONTINUED] acetaminophen (TYLENOL) 325 mg tablet Take 975 mg by mouth every 8 (eight) hours (Patient not taking: Reported on 4/5/2022 )       No current facility-administered medications on file prior to visit       No Known Allergies     Objective     /78   Pulse 81   Ht 5' (1 524 m)   Wt 83 5 kg (184 lb)   BMI 35 94 kg/m²      PE:  AAOx 3  WDWN  Hearing intact, no drainage from eyes  no audible wheezing  no abdominal distension  LE compartments soft, AT/GS intact    Ortho Exam:  right Knee:   INC: healed, No erythema, mild swelling, small abrasion over the lateral aspect of mid incision without erythema or drainage   AROM: 5 - 110 degrees  PROM: 2- 110 degrees    Left upper extremity:  No deformity  +TTP over shoulder  Active shoulder , active shoulder ER 45  Active elbow full ROM without pain  Sensation intact  Palpable radial pulse     Imaging:  I have personally reviewed pertinent films in PACS  X-rays left humerus- no acute osseous deformity

## 2022-04-22 RX ORDER — PREGABALIN 150 MG/1
150 CAPSULE ORAL 3 TIMES DAILY
Qty: 120 CAPSULE | Refills: 1
Start: 2022-04-22 | End: 2022-06-21

## 2022-04-25 ENCOUNTER — TELEPHONE (OUTPATIENT)
Dept: OBGYN CLINIC | Facility: HOSPITAL | Age: 57
End: 2022-04-25

## 2022-04-25 NOTE — TELEPHONE ENCOUNTER
Dr Mary Ram    [phone]    Patient had right knee replacement on 3/9  She is having 9/10  She has been taking  Oxycodone 5 mg, which helps

## 2022-04-25 NOTE — TELEPHONE ENCOUNTER
Per Parag OV 4/21 "Pain control prn- tylenol and oxycodone  Unable to prescribe NSAID due to lithium  Patient is aware that I will not prescribe any more oxycodone after this script  She will call her PCP regarding lyrica refill  "    Spoke to Samantha  She denies any recent injury, denies any new symptoms other then post surgical pain  She was advised per the 4/21 office visit note  NN Recommended to take tylenol as prescribed, "Take 2 tablets (1,000 mg total) by mouth 3 (three) times a day"  She reports she did start taking that "today"  NN encouraged again the use of tylenol, 1000mg three times daily  She noted she is almost out of oxycodone, NN did again remind her that surgeon's OV notes she will not be receiving any  More oxycodone RXs, reminded to contact her PCP regarding lyrica  She was encouraged to work with PT, contact her PCP and call back with additional questions  She verbalizes understanding

## 2022-04-27 DIAGNOSIS — G89.4 CHRONIC PAIN DISORDER: ICD-10-CM

## 2022-04-27 RX ORDER — PREGABALIN 150 MG/1
150 CAPSULE ORAL 3 TIMES DAILY
Qty: 120 CAPSULE | Refills: 1
Start: 2022-04-27 | End: 2022-06-26

## 2022-04-28 ENCOUNTER — TELEPHONE (OUTPATIENT)
Dept: OBGYN CLINIC | Facility: HOSPITAL | Age: 57
End: 2022-04-28

## 2022-04-28 NOTE — TELEPHONE ENCOUNTER
Patient sees Dr Shayla Morin      Patient is calling to schedule PT   Advised this is ortho and provided her with the # to PT

## 2022-05-03 ENCOUNTER — PATIENT OUTREACH (OUTPATIENT)
Dept: INTERNAL MEDICINE CLINIC | Facility: CLINIC | Age: 57
End: 2022-05-03

## 2022-05-03 NOTE — PROGRESS NOTES
Patient wanted to give  her new Address  She [address] [place] [address]  Patient is living with her daughter  Gave patient the list of Housing names we has applied and Phone number for her to call and find out what number she is on the list      Patient states she had Surgery on her knee  Pt has no other concerns or questios at this time

## 2022-05-05 ENCOUNTER — EVALUATION (OUTPATIENT)
Dept: PHYSICAL THERAPY | Facility: CLINIC | Age: 57
End: 2022-05-05
Payer: MEDICARE

## 2022-05-05 DIAGNOSIS — Z96.651 STATUS POST TOTAL KNEE REPLACEMENT, RIGHT: Primary | ICD-10-CM

## 2022-05-05 PROCEDURE — 97530 THERAPEUTIC ACTIVITIES: CPT

## 2022-05-05 PROCEDURE — 97162 PT EVAL MOD COMPLEX 30 MIN: CPT

## 2022-05-05 NOTE — PROGRESS NOTES
PT Evaluation     Today's date: 2022  Patient name: Haider Moralez  : 1965  MRN: [id number]  Referring provider: Radha Paul PA-C  Dx:   Encounter Diagnosis     ICD-10-CM    1  Status post total knee replacement, right  [id number] Ambulatory Referral to Physical Therapy                  Assessment  Assessment details: Samantha Varner is a 60 y o  female who presents today to outpatient therapy for evaluation of status post total knee replacement, right  Upon assessment today, pt exhibits S/S consistent with this surgery, including gait deviations; decreased functional mobility during TUG; decreased stability during rhomberg stance; decreased LE strength; decreased (R) knee ROM; and decreased HS flexibility  These impairments are contributing to functional limitations with walking; transferring; and negotiating stairs  Pt would therefore benefit from PT intervention in order to address the aforementioned deficits so that she can return to her PLOF and function comfortably/safely in her home and surrounding environment  Thank you for the referral! - Catie Thomson, PT, DPT  Impairments: abnormal gait, abnormal or restricted ROM, abnormal movement, impaired balance, impaired physical strength, pain with function and poor body mechanics  Understanding of Dx/Px/POC: good   Prognosis: good    Goals  STG 1: Pt will demonstrate compliance with HEP to supplement therapy in 1-2 weeks  STG 2: Pt will report 25% reduction in pain in 2-4 weeks  LTG 1: Pt will be able to ambulate thru her home and community with the LRAD with min to no difficulty in 6-8 weeks  LTG 2: Pt will be able to transfer from all surfaces in her home with min difficulty in 6-8 weeks  LTG 3: Pt will be able to negotiate 1 flight of stairs in her home reciprocally with unilateral UE assist in 6-8 weeks  LTG 4: Return pt to maximum level of safe function  INITIAL: Pt reports (3) recent falls frequent feelings of instability    LTG 2:    Plan  Plan details: Educated pt today about PT goals; prognosis; diagnosis; role of balance after surgery; ROM goals; soreness following PT; and POC  Patient would benefit from: skilled physical therapy  Planned modality interventions: cryotherapy, TENS and unattended electrical stimulation  Planned therapy interventions: abdominal trunk stabilization, postural training, patient education, neuromuscular re-education, joint mobilization, manual therapy, massage, activity modification, balance, body mechanics training, Abel taping, strengthening, stretching, therapeutic activities, coordination, flexibility, home exercise program, therapeutic exercise, functional ROM exercises, gait training and balance/weight bearing training  Frequency: 2x week  Duration in weeks: 4  Treatment plan discussed with: patient        Subjective Evaluation    History of Present Illness  Date of surgery: 3/9/2022  Mechanism of injury: Pt states that she obtained a (R) TKR on 3/9/2022  It was a painful recovery - she went to rehab for a few weeks  She was recently advised to perform OPPT  She has been using using a rolling walker for daily ambulation  Despite use of this, she reports a hx of (3) recent falls  Most recently, she fell while walking in her home (she is not sure exactly what happened) and fell onto her (L) shoulder - she obtained x-rays of this and her knee which were unremarkable  Currently, pt reports difficulty walking (walks throughout her home but has not attempted much community ambulation); negotiating stairs (she is able to do so but requires heavy reliance on UE assist); and transferring  She reports an MD f/u appt on May 20th  Eases: Ice  Pain  Current pain ratin  At best pain ratin  At worst pain ratin  Location: Pt reports pain thru the (R) knee  Patient Goals  Patient goal: Pt would like to be able to stay up "straighter" while walking          Objective     Observations     Additional Observation Details  Incision is fully approximated and well healed  Scab present from recent fall along (R) anterior knee is without drainage or erythema  Active Range of Motion   Left Knee   Flexion: 124 degrees   Extension: WFL    Right Knee   Flexion: 111 degrees   Extension: -15 degrees     Additional Active Range of Motion Details  HS flexibility mod-severely dec on (R), mod dec on (L)  LTG: Mod dec  Strength/Myotome Testing     Left Knee   Flexion: 4-  Extension: 4+    Right Knee   Flexion: 5  Extension: 5    General Comments:      Knee Comments  Gait: Rolling walker, mod-severe increased forward trunk lean, decreased step length, foot flat contact  TU secs, RW  LTG: <30 secs  Rhomberg: 10 secs, mod sway, unsteady  Precautions: Hx (R) TKR; perform supine exercises with HOB elevated secondary to L/S history; Dysphagia; Sleep apnea; HTN: migraine; CVA; L/S radiculopathy; hx L/S surgery; encephalopathy; OA; overactive bladder; fibromyalgia; tardive dyskinesia; spinal stenosis;  Bipolar II; anxiety; insomnia; depression; hyperlipidemia  Date             FOTO IE             Re-eval IE                Manuals             (R) knee flex/ext PROM                                                    Neuro Re-Ed             Semi tandem stance nv            Rhomberg on Foam nv            Small Hurdles             Tandem Walk             Wobbleboard                                                    Ther Ex             DL Leg Press             Gastroc (S) On Wedge nv            Standing HR nv            Heelslides in chair nv            LAQs nv            Quad Set nv            Heel prop nv            SLR nv            Bridges             Clams                                        Ther Activity             Recumbent Bike  nv            Sidesteps             Repeated Sit to stand with Airex nv            Mini squats             Step Ups             Pt Edu 8' AL Gait Training 5/5            Prn                          Modalities 5/5            Ice, prn

## 2022-05-09 ENCOUNTER — TELEPHONE (OUTPATIENT)
Dept: OBGYN CLINIC | Facility: HOSPITAL | Age: 57
End: 2022-05-09

## 2022-05-09 NOTE — TELEPHONE ENCOUNTER
Tariq Sandhu, can you please call patient and have her come in the office on Friday at 9:00 am? She may have to be forced on the schedule   Thanks

## 2022-05-09 NOTE — TELEPHONE ENCOUNTER
Samantha is calling to speak with Dr Kami Fonseca  Patient states she is having repeated falls when trying to walk with her walker  Patient states she had her right knee replaced about 2 months ago and keeps falling and landing on the surgical knee  Patient is very upset and would like to speak with someone about why she keeps falling  Please advise      Samantha [phone]

## 2022-05-09 NOTE — TELEPHONE ENCOUNTER
VM left for patient  Will have team get her on the schedule   Requested the patient call me to confirm 9am

## 2022-05-09 NOTE — TELEPHONE ENCOUNTER
Patient called back to check status , advised message was sent and we are awaiting a response        She understood and had no further questions or concerns and will wait for call back

## 2022-05-09 NOTE — TELEPHONE ENCOUNTER
I spoke to patient  She reports she "loses my balance" often which is causing the falls, even with using the RW  She denies that her joint is "giving out " She was recommended to contact her PCP regarding her balance concerns, she reports they directed her to ortho on concerns that she is "falling" onto the new joint  She has f/u on 5/20, and is aware If surgeon has further recommendations we will be In ouch

## 2022-05-10 ENCOUNTER — PATIENT OUTREACH (OUTPATIENT)
Dept: INTERNAL MEDICINE CLINIC | Facility: CLINIC | Age: 57
End: 2022-05-10

## 2022-05-10 NOTE — PROGRESS NOTES
Outpatient Care Management Note:    Patient on schedule today for falls since her right knee replacement  Patient feeling unbalanced when walking  Patient did reach out to ortho office  Chart reviewed and patient currently admitted to [facility] as of 5/9/22 for a fall, ambulatory dysfunction and for anxiety  I requested staff cancel her appointment with PCP office for today as she is admitted

## 2022-05-19 ENCOUNTER — APPOINTMENT (OUTPATIENT)
Dept: PHYSICAL THERAPY | Facility: CLINIC | Age: 57
End: 2022-05-19
Payer: MEDICARE

## 2022-05-25 ENCOUNTER — PATIENT OUTREACH (OUTPATIENT)
Dept: INTERNAL MEDICINE CLINIC | Facility: CLINIC | Age: 57
End: 2022-05-25

## 2022-05-25 NOTE — PROGRESS NOTES
Outpatient Care Management Note:    Chart reviewed  Patient was at [facility] for ambulatory dysfunction since 5/9  I called and spoke with patient she reports she is currently still in the hospital and to be discharged home later this evening  Patient reports unable to find an accepting facility for short term rehab that she would agree to go to  Patient reports she will return to her daughter's home and plans to start outpatient physical therapy  Patient aware of ortho appointment on 6/2  Patient reports when she was in the hospital she had a seizure  Patient planning on further following up with Neurology office  Patient scheduled to follow up with PCP on 6/20  Patient encouraged to call with further questions, concerns, or needs  Patient feels she will have enough support returning to her daughter's home  Patient encouraged to follow up with mental health provider

## 2022-05-31 ENCOUNTER — TELEPHONE (OUTPATIENT)
Dept: OBGYN CLINIC | Facility: HOSPITAL | Age: 57
End: 2022-05-31

## 2022-05-31 ENCOUNTER — TELEPHONE (OUTPATIENT)
Dept: INTERNAL MEDICINE CLINIC | Facility: CLINIC | Age: 57
End: 2022-05-31

## 2022-05-31 NOTE — TELEPHONE ENCOUNTER
Rec'd call from Patient, she was told by Physical Therapy that she needs a new order as they already closed out the previous one provided by Ortho  I instructed Samantha to call Ortho to request the order as they know more about her case and it was their department that ordered the initial script  Pt called back because Ortho told her they cant help her  It has to be her PCP  I called the Physical Therapy office at [phone] to get clarification  Per my conversation with the  at Physical Therapy, they confirmed the Physical Therapy order must come from the initial referring doctor (Ortho)  I explained that the patient keeps getting the run around and asked that Physical Therapy reach out to Ortho directly at this point  Per the , she will Ortho today

## 2022-06-01 ENCOUNTER — TELEPHONE (OUTPATIENT)
Dept: OBGYN CLINIC | Facility: HOSPITAL | Age: 57
End: 2022-06-01

## 2022-06-01 DIAGNOSIS — Z96.651 S/P TKR (TOTAL KNEE REPLACEMENT) USING CEMENT, RIGHT: Primary | ICD-10-CM

## 2022-06-01 NOTE — TELEPHONE ENCOUNTER
DR Jarrett//TKA   RE: PT order    Patient states physical therapy needs a new PT script placed in the system from Dr Cory Ponce for R knee

## 2022-06-02 ENCOUNTER — TELEPHONE (OUTPATIENT)
Dept: NEUROLOGY | Facility: CLINIC | Age: 57
End: 2022-06-02

## 2022-06-02 ENCOUNTER — TELEPHONE (OUTPATIENT)
Dept: INTERNAL MEDICINE CLINIC | Facility: CLINIC | Age: 57
End: 2022-06-02

## 2022-06-02 ENCOUNTER — OFFICE VISIT (OUTPATIENT)
Dept: OBGYN CLINIC | Facility: CLINIC | Age: 57
End: 2022-06-02
Payer: MEDICARE

## 2022-06-02 VITALS
BODY MASS INDEX: 35.89 KG/M2 | HEIGHT: 60 IN | SYSTOLIC BLOOD PRESSURE: 137 MMHG | HEART RATE: 77 BPM | WEIGHT: 182.8 LBS | DIASTOLIC BLOOD PRESSURE: 82 MMHG

## 2022-06-02 DIAGNOSIS — Z96.651 S/P TKR (TOTAL KNEE REPLACEMENT), RIGHT: ICD-10-CM

## 2022-06-02 DIAGNOSIS — Z01.818 PREOPERATIVE TESTING: ICD-10-CM

## 2022-06-02 DIAGNOSIS — Z01.818 ENCOUNTER FOR PREADMISSION TESTING: ICD-10-CM

## 2022-06-02 DIAGNOSIS — M17.12 PRIMARY OSTEOARTHRITIS OF LEFT KNEE: Primary | ICD-10-CM

## 2022-06-02 PROCEDURE — 99213 OFFICE O/P EST LOW 20 MIN: CPT | Performed by: ORTHOPAEDIC SURGERY

## 2022-06-02 RX ORDER — FOLIC ACID 1 MG/1
1 TABLET ORAL DAILY
Qty: 30 TABLET | Refills: 1 | Status: SHIPPED | OUTPATIENT
Start: 2022-06-02 | End: 2022-06-16

## 2022-06-02 RX ORDER — CHLORHEXIDINE GLUCONATE 4 G/100ML
SOLUTION TOPICAL DAILY PRN
Status: CANCELLED | OUTPATIENT
Start: 2022-07-05

## 2022-06-02 RX ORDER — MULTIVITAMIN
1 TABLET ORAL DAILY
Qty: 30 TABLET | Refills: 1 | Status: SHIPPED | OUTPATIENT
Start: 2022-06-02

## 2022-06-02 RX ORDER — CHLORHEXIDINE GLUCONATE 0.12 MG/ML
15 RINSE ORAL ONCE
Status: CANCELLED | OUTPATIENT
Start: 2022-07-05 | End: 2022-06-02

## 2022-06-02 RX ORDER — CEFAZOLIN SODIUM 2 G/50ML
2000 SOLUTION INTRAVENOUS ONCE
Status: CANCELLED | OUTPATIENT
Start: 2022-07-05 | End: 2022-06-02

## 2022-06-02 RX ORDER — ASCORBIC ACID 500 MG
500 TABLET ORAL DAILY
Qty: 30 TABLET | Refills: 1 | Status: SHIPPED | OUTPATIENT
Start: 2022-06-02

## 2022-06-02 RX ORDER — FERROUS SULFATE TAB EC 324 MG (65 MG FE EQUIVALENT) 324 (65 FE) MG
324 TABLET DELAYED RESPONSE ORAL DAILY
Qty: 30 TABLET | Refills: 1 | Status: SHIPPED | OUTPATIENT
Start: 2022-06-02

## 2022-06-02 RX ORDER — ACETAMINOPHEN 325 MG/1
975 TABLET ORAL ONCE
Status: CANCELLED | OUTPATIENT
Start: 2022-07-05 | End: 2022-06-02

## 2022-06-02 RX ORDER — SODIUM CHLORIDE 9 MG/ML
75 INJECTION, SOLUTION INTRAVENOUS CONTINUOUS
Status: CANCELLED | OUTPATIENT
Start: 2022-07-05

## 2022-06-02 NOTE — TELEPHONE ENCOUNTER
NETO: 06/03/22 @ 3:45 PM with J Carlos Glover in [place]  FYI: I spoke with one of the nurse in Orthopedics pt need a neuro clearance for her Upcoming surgery on 07/05/22  I also spoke with José Rebolledo and recommend that pt need to schedule appointment as pt LOV was November of 2020 with Dr Rosalio Edward  Thank you

## 2022-06-02 NOTE — PROGRESS NOTES
Assessment/Plan:  1  Primary osteoarthritis of left knee    2  S/P TKR (total knee replacement), right    3  Preoperative testing    4  Encounter for preadmission testing      Orders Placed This Encounter   Procedures    XR knee 4+ vw left injury    XR bone length (scanogram)    Comprehensive metabolic panel    Hemoglobin A1C W/EAG Estimation    CBC and differential    C-reactive protein    Anemia Panel w/Reflex    Protime-INR    APTT    Ambulatory referral to Physical Therapy    Ambulatory referral to [address] [facility] referral to Physical Therapy    Ambulatory Referral to Neurology    Type and screen       · Samantha is doing well status post R TKA on 3/9/22  · Continue home exercises  · Pain control prn-OTC pain medication  · Patient should call ahead for abx prior to dental appts  She does not have any teeth so she does not plan any dental appointments  The patient has severe LEFT knee arthritis  We discussed treatment options as well as risks and benefits of treatment options  The patient has intolerable pain, feels limited and has tried and failed conservative treatment options at this point  They have elected to proceed with a LEFT TKA  The risks of surgery include, but are not limited to infection, blood clot, wound healing problems, blood loss, damage to blood vessels and nerves, persistent pain and stiffness, fracture, need for additional surgery, need for revision surgery, failure of hardware, heart attack, stroke, death  The patient understood and agreed to by oral and written consent  I answered all questions regarding surgery  The patient has the direct phone number to the office for any further questions  DVT prophylaxis: lovenox x 4 weeks then ASA x 2 weeks  Clearance will be obtained from: PCP, neurology  · We will plan for discharge to rehab post op     · Patient instructed to bring home medication valbenazine tosylate 80 mg to the hospital as this medication is not on the hospital formulary and we were unable to obtain it during last admission  · Pre op supplements prescribed today to begin 30 days prior to surgery  Return for post op  I answered all of the patient's questions during the visit and provided education of the patient's condition during the visit  The patient verbalized understanding of the information given and agrees with the plan  This note was dictated using Ikanos software  It may contain errors including improperly dictated words  Please contact physician directly for any questions  Subjective   Chief Complaint:   Chief Complaint   Patient presents with    Right Knee - Post-op, Follow-up       Samantha Mcmillan Left is a 60 y o  female who presents for 12 week follow up s/p right TKA on 3/9/22  She reports she is doing well  She feels her right knee healed well  She notes mild tightness over anterior knee but this is improving  Patient is taking tylenol for pain but notes this doesn't help  Patient did not ever attend outpatient PT  She is using a walker because she feels off balance and reports multiple falls  Patient was recent seen at [facility] due to falls where she was evaluated by neurology  Patient states she is unsure what is causing her to fall  She is happy with her R TKA  Patient would like to schedule L TKA as soon as possible  Denies history of MRSA, diabetes, HIV/ Hep C, blood clots, family history of blood clots  Patient is not a smoker  She does not see cardiology  Patient does not have teeth  Patient was discharged to [address] [place] [address] after R TKA  Review of Systems  ROS:    See HPI for musculoskeletal review  All other systems reviewed are negative     History:  Past Medical History:   Diagnosis Date    Acid reflux     Anxiety     RESOLVED: [id number]    Arthritis     Bipolar 2 disorder (HCC)     FOLLOWS WITH PSYCHIATRIST   CONTINUE LAMOTRIGINE; RESOLVED: [id number]    Depression     Familial tremor     both hands    Fibromyalgia     LAST ASSESSED: [id number]    GERD (gastroesophageal reflux disease)     Hearing aid worn     left ear    [place] (hard of hearing)     left ear    Hyperlipidemia     Hypertension     Left-sided weakness     Lower back pain     Memory loss of unknown cause     long and short term    Migraine     Obesity     Obesity, Class II, BMI 35-39 9     Overactive bladder     Panic attack     Post traumatic stress disorder     Seasonal allergies     Stroke [facility])     questionable stroke 2009    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    CONT ASA 81 MG DAILY; RESOLVED: [id number]    Urinary incontinence     Wears dentures     partial lower / full upper    Wears glasses      Past Surgical History:   Procedure Laterality Date    BACK SURGERY       SECTION      COLONOSCOPY      RESOLVED: [id number]    EAR SURGERY      EGD      HYSTERECTOMY      MYRINGOTOMY W/ TUBES Left     NECK SURGERY  2019    DC CYSTOURETHROSCOPY N/A 2016    Procedure: CYSTOSCOPY, BOTOX INJECTION;  Surgeon: Collin Esparza MD;  [facility]: [facility];  Service: Gynecology    DC IMPLANT SPINAL NEUROSTIM/ Right 2/10/2021    Procedure: REPLACEMENT IMPLANTABLE PULSE GENERATOR DORSAL SPINAL COLUMN STIMULATOR, RIGHT;  Surgeon: Gerard Rahman MD;  [facility];  Service: Neurosurgery    DC PERCUT IMPLNT [facility] Right 2020    Procedure: INSERTION THORACIC DORSAL COLUMN SPINAL CORD STIMULATOR PERCUTANEOUS W IMPLANTABLE PULSE GENERATOR, RIGHT;  Surgeon: Gerard Rahman MD;  [facility];  Service: Neurosurgery    DC TOTAL KNEE ARTHROPLASTY Right 3/9/2022    Procedure: ARTHROPLASTY KNEE TOTAL;  Surgeon: Gabi Carson DO;  [facility]: [facility];  Service: Orthopedics    TONSILLECTOMY     [facility]    UPPER GASTROINTESTINAL ENDOSCOPY  2020     Social History   Social History     Substance and Sexual Activity   Alcohol Use Not Currently    Comment: 2 x year; being a social drinker as per all scripts      Social History     Substance and Sexual Activity   Drug Use No     Social History     Tobacco Use   Smoking Status Never Smoker   Smokeless Tobacco Never Used     Family History:   Family History   Problem Relation Age of Onset    Colon cancer Mother     Alzheimer's disease Father     Stroke Father     Colon cancer Brother     Bipolar disorder Brother     Breast cancer Maternal Aunt     Colon cancer Maternal Aunt     Heart disease Other     Diabetes Other     Hypertension Other     Seizures Son     Depression Paternal Grandfather     No Known Problems Sister     No Known Problems Brother     Thyroid disease Neg Hx        Current Outpatient Medications on File Prior to Visit   Medication Sig Dispense Refill    acetaminophen (TYLENOL) 500 mg tablet Take 2 tablets (1,000 mg total) by mouth 3 (three) times a day 180 tablet 0    aspirin (ECOTRIN) 325 mg EC tablet TAKE 1 TABLET BY MOUTH TWICE A DAY WITH FOOD STOP ASPIRIN 81 MG WHILE TAKING THIS 56 tablet 3    atorvastatin (LIPITOR) 40 mg tablet TAKE 1 TABLET BY MOUTH EVERY DAY 90 tablet 3    busPIRone (BUSPAR) 10 mg tablet Take 20 mg by mouth 3 (three) times a day       Calcium Ascorbate (VITAMIN C) 500 mg tablet Take 1 tablet (500 mg total) by mouth daily 30 tablet 1    calcium carbonate (TUMS) 500 mg chewable tablet Chew 2 tablets every 8 (eight) hours as needed      ferrous sulfate 324 (65 Fe) mg TAKE 1 TABLET (324 MG TOTAL) BY MOUTH IN THE MORNING 90 tablet 1    folic acid (FOLVITE) 1 mg tablet Take 1 tablet (1 mg total) by mouth daily 30 tablet 1    hydrOXYzine HCL (ATARAX) 50 mg tablet Take 50 mg by mouth 3 (three) times a day as needed for itching      lithium carbonate (LITHOBID) 300 mg CR tablet Take 1 tablet (300 mg total) by mouth daily at bedtime 30 tablet 0    LORazepam (ATIVAN) 0 5 mg tablet Take 1 tablet (0 5 mg total) by mouth 2 (two) times a day as needed for anxiety for up to 10 days 10 tablet 0    naloxone (NARCAN) 4 mg/0 1 mL nasal spray Administer 1 spray into a nostril  If no response after 2-3 minutes, give another dose in the other nostril using a new spray  1 each 1    oxyCODONE (ROXICODONE) 5 immediate release tablet Take 1 tablet (5 mg total) by mouth 2 (two) times a day as needed for severe pain Max Daily Amount: 10 mg 14 tablet 0    oxyCODONE (ROXICODONE) 5 immediate release tablet Take 1 tablet (5 mg total) by mouth in the morning Max Daily Amount: 5 mg 7 tablet 0    pantoprazole (PROTONIX) 20 mg tablet Take 1 tablet (20 mg total) by mouth daily in the early morning 90 tablet 3    PARoxetine (PAXIL) 30 mg tablet Take 2 tablets (60 mg total) by mouth daily 180 tablet 3    potassium chloride (K-DUR,KLOR-CON) 10 mEq tablet Take 30 mEq by mouth in the morning      prazosin (MINIPRESS) 2 mg capsule Take 1 capsule (2 mg total) by mouth daily at bedtime 30 capsule 0    pregabalin (LYRICA) 150 mg capsule Take 1 capsule (150 mg total) by mouth 3 (three) times a day 120 capsule 1    propranolol (INDERAL) 10 mg tablet Take 1 tablet (10 mg total) by mouth 2 (two) times a day before breakfast and lunch  0    QUEtiapine (SEROquel) 25 mg tablet Take 2 tablets (50 mg total) by mouth daily at bedtime 180 tablet 3    senna (SENOKOT) 8 6 MG tablet Take 1 tablet by mouth daily as needed      Valbenazine Tosylate (Ingrezza) 80 MG CAPS Take 80 mg by mouth daily 90 capsule 0     No current facility-administered medications on file prior to visit       No Known Allergies     Objective     /82   Pulse 77   Ht 5' (1 524 m)   Wt 82 9 kg (182 lb 12 8 oz)   BMI 35 70 kg/m²      PE:  AAOx 3  WDWN  Hearing intact, no drainage from eyes  no audible wheezing  no abdominal distension  LE compartments soft, AT/GS intact    Ortho Exam:  right Knee:   INC: healed, No erythema, minimal swelling  AROM: 3 - 120 degrees    Left knee  AROm: 0- 120 degrees     XR L knee:  Severe arthritis with joint space narrowing and osteophyte formation, valgus deformity      Scribe Attestation    I,:  Brooklyn Morales PA-C am acting as a scribe while in the presence of the attending physician :       I,:  Angel Duran, DO personally performed the services described in this documentation    as scribed in my presence :

## 2022-06-02 NOTE — TELEPHONE ENCOUNTER
----- Message from Miley Arellano, [address] sent at 6/2/2022 11:39 AM EDT -----  Regarding: PHYS ORD REQUIRED  Please place an Amb  Ref  To [facility] Neurology in [address] [place] [address] Rd  Appt  Date: 6/3/2022    Dx  Codes: M54 16, M25 50, R79 82, E53 8, M62 81        Thank you

## 2022-06-03 ENCOUNTER — OFFICE VISIT (OUTPATIENT)
Dept: NEUROLOGY | Facility: CLINIC | Age: 57
End: 2022-06-03
Payer: MEDICARE

## 2022-06-03 ENCOUNTER — PATIENT OUTREACH (OUTPATIENT)
Dept: INTERNAL MEDICINE CLINIC | Facility: CLINIC | Age: 57
End: 2022-06-03

## 2022-06-03 VITALS
HEIGHT: 60 IN | TEMPERATURE: 97.3 F | WEIGHT: 182 LBS | RESPIRATION RATE: 18 BRPM | HEART RATE: 80 BPM | SYSTOLIC BLOOD PRESSURE: 169 MMHG | BODY MASS INDEX: 35.73 KG/M2 | DIASTOLIC BLOOD PRESSURE: 98 MMHG

## 2022-06-03 DIAGNOSIS — R94.31 QT PROLONGATION: ICD-10-CM

## 2022-06-03 DIAGNOSIS — Z86.69 HISTORY OF MIGRAINE: ICD-10-CM

## 2022-06-03 DIAGNOSIS — R56.9 SEIZURE-LIKE ACTIVITY (HCC): ICD-10-CM

## 2022-06-03 DIAGNOSIS — M17.0 PRIMARY LOCALIZED OSTEOARTHRITIS OF BOTH KNEES: ICD-10-CM

## 2022-06-03 DIAGNOSIS — G24.01 TARDIVE DYSKINESIA: Primary | ICD-10-CM

## 2022-06-03 DIAGNOSIS — G31.84 MILD COGNITIVE IMPAIRMENT: ICD-10-CM

## 2022-06-03 DIAGNOSIS — G89.29 CHRONIC BACK PAIN: ICD-10-CM

## 2022-06-03 DIAGNOSIS — M54.9 CHRONIC BACK PAIN: ICD-10-CM

## 2022-06-03 DIAGNOSIS — R29.6 FREQUENT FALLS: ICD-10-CM

## 2022-06-03 PROCEDURE — 99214 OFFICE O/P EST MOD 30 MIN: CPT | Performed by: NURSE PRACTITIONER

## 2022-06-03 NOTE — TELEPHONE ENCOUNTER
Received a call from [facility] at [facility] and she stated the order that was placed by Ortho is nopt valid as it has to come from PCP or insurance will not pay for visit  Patient's appt is this afternoon, please place a new order

## 2022-06-03 NOTE — PROGRESS NOTES
Patient ID: Kathy Santillan is a 60 y o  female  Assessment/Plan:  Patient Instructions:  Reduce ingrezza to 60mg/day-new prescription provided  Get EKG in 1-2 weeks after reducing the medication  Make sure to prevent falls  Let us know if there is any repeat seizure like activity  I will speak with the surgeon regarding the upcoming surgery  Contact your primary care physician for refill of lyrica-it would be a good idea to be on a lower dose if this is restarted  I do encourage you to get in PT to build strength  Follow up in 2 months time       Diagnoses and all orders for this visit:    Tardive dyskinesia  -     Valbenazine Tosylate 60 MG CAPS; Take 60 mg by mouth in the morning  -     ECG 12 lead; Future    Mild cognitive impairment  -[place] today 16/30  -recent labs reviewed  -lives with family-daughter/; needs some assistance with ADL's    Seizure-like activity ([facility] [address] )  -while in hospital in [facility] in May, unprovoked, CT/EEG did not show cause; cannot have MRI because of SCS    QT prolongation  -     ECG 12 lead; Future  Qtc 472 on last EKG at [facility]; plan is to decrease ingrezza to 60mg (patient also on 60mg paxil) and repeat EKG 1-2 weeks after  Primary localized osteoarthritis of both knees  -had Right TKR 3/9/2022- did have fall 4/4/2022 and then reports of frequent falls on last admission to [facility] from 5/9-5/25  -Denies falls since discharge today; is alone at office visit; using walker  -She really wants to have left TKR planned for 7/5/2022  -From a neurology point of view her condition is stable to have surgery but in her case she is at increased risk for worsening in cognitive impairment or possible repeat stroke  She states understanding; unfortunately her family his not here with her today to discuss this      History of migraine  -denies headaches recently    Chronic back pain  -s/p thoracolumbar decompression and fusion in 2018 in [place] with subsequent laminectomy syndrome -S/p thoracic spinal cord stimulator placement in 7/28/20    Frequent falls        Subjective:  History is limited at times by dysarthria because of tardive dyskinesia as well as cognitive impairment  No family members available to confirm/deny her reported history  HPI  Rufus Parra is a 60year old female with past medical history of bipolar, 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, CVA (states 2009 in [place] with residual left sided weakness),  Obesity (was going to have weight loss surgery but never completed this), seizure like activity not on AED, cognitive impairment/ history of encephalopathy likely related to polypharmacy, MIMI not on CPAP, OA with gait dysfunction/chronic pain, DDD s/p lumbar/cervical fusion and now with SCS,  migraine who presents today for surgical clearance  She is alone  Her only request is that she gets cleared to have Left TKR  She had right TKR 3/9/2022; she states this has made her more mobile  Notes suggest she had pain and increased falls after this surgery  There was also discussion at one point of her needing more care than family could give, but she states she has been ok with the assistance she gets at home  She states migraines are well controlled at this time  She does have pain and currently does not have any more lyrica; states has been out for a few weeks  Recently she was evaluated by neurology at [facility] (was hospitalized for gait dysfunction from 5/9-5/25/2022)  for witnessed seizure activity; this was first unprovoked seizure; EEG showed diffuse slowing; CT head neg; she was not able to get MRI because of her stimulator  EKG showed slight Qt prolongation; she did have reduction in her seroquel recently; she complains of continued bilateral hand tremor  She denies any repeat seizure like activity since being home; she does have family history of seizures  No bowel/bladder complaints; no appetite or sleep complaints today      Recent Labs:  hgb 15 4  Platelet 881  tsh 1 5  k 3 2  Mg 2 1  b12 833  Folate 15 5   Lithium 0 4  Creat 0 73    Qtc 472 on EKG from 5/9; undetermined rhythm, rate was 82    CT head 5/18:Impression: No acute hemorrhage, mass or ischemia identified    EEG 5/19/2022: Interpretation: This routine EEG is performed in the the lethargic state  It is abnormal   due to  intermittent diffuse slowing and intermittent slowing of the   posterior dominant rhythm which is consistent with a diffuse nonspecific   neuronal dysfunction, as may be seen with a mild encephalopathy  No epileptiform discharges or EEG seizures are seen  There are no   intentional button pushes or patient-reported events  CT c spine 5/9/2022:  Impression:   1  Suboptimal evaluation of C5 and C7 vertebral levels due to artifact  Otherwise, no acute fracture or dislocation in the cervical spine  Continue to   follow trauma protocol  2  Postsurgical changes at C4-C5  The following portions of the patient's history were reviewed and updated as appropriate: allergies, current medications, past family history, past medical history, past social history, past surgical history and problem list          Objective:    Blood pressure 169/98, pulse 80, temperature (!) 97 3 °F (36 3 °C), temperature source Tympanic, resp  rate 18, height 5' (1 524 m), weight 82 6 kg (182 lb), not currently breastfeeding  Physical Exam  Vitals reviewed  Constitutional:       Appearance: She is obese  HENT:      Head: Normocephalic  Mouth/Throat:      Mouth: Mucous membranes are dry  Comments: Crowded oropharynx  Eyes:      General:         Right eye: No discharge  Left eye: No discharge  Extraocular Movements: Extraocular movements intact  Pupils: Pupils are equal, round, and reactive to light  Cardiovascular:      Rate and Rhythm: Normal rate and regular rhythm  Pulmonary:      Effort: Pulmonary effort is normal    Musculoskeletal:      Right lower leg: No edema        Left lower leg: No edema    Skin:     General: Skin is dry  Findings: No rash  Neurological:      Mental Status: She is alert  She is disoriented  Deep Tendon Reflexes: Strength normal    Psychiatric:         Mood and Affect: Mood normal          Behavior: Behavior is cooperative  Comments: Speech is difficult to understand at times because of facial movements         Neurological Exam  Mental Status  Alert  [place] 16/30-see scanned in copy  Cranial Nerves  CN II: Right visual acuity: counts fingers  Left visual acuity: counts fingers  CN III, IV, VI: Extraocular movements intact bilaterally  Pupils equal round and reactive to light bilaterally  CN V: Facial sensation is normal   CN VII: Full and symmetric facial movement  CN VIII: Hearing is normal   CN XI: Shoulder shrug strength is normal   CN XII: Tongue midline without atrophy or fasciculations  Motor  Normal muscle bulk throughout  Strength is 5/5 throughout all four extremities  Orofacial dyskinesia   Bilateral wrist clonus, worse on left; mild rigidity noted at elbow on left, no ankle clonus, left patellar reflex is 3+ while other reflexes are 2+; could be consistent with patient provided hx of stroke  Sensory  Light touch is normal in upper and lower extremities  Coordination    Tremor with FNF testing but no significant dysmetria  Gait  Casual gait: Normal stride length  Hesitant gait  Unable to rise from chair without using arms  Stooped posture  Occasional freezing of gait/difficulty with turning  Uses walker; did not test tandem gait for safety concerns  ROS:    Review of Systems   Constitutional: Negative for chills and fever  HENT: Negative for ear pain and sore throat  Eyes: Negative for pain and visual disturbance  Respiratory: Negative for cough and shortness of breath  Cardiovascular: Negative for chest pain and palpitations  Gastrointestinal: Negative for abdominal pain and vomiting     Genitourinary: Negative for dysuria and hematuria  Musculoskeletal: Positive for back pain  Negative for arthralgias  Skin: Negative for color change and rash  Neurological: Positive for tremors (bilateral hands)  Negative for seizures and syncope  All other systems reviewed and are negative    ROS was reviewed and updated as appropriate

## 2022-06-03 NOTE — PROGRESS NOTES
HIRAM HARRISON called to follow up with Pt  Pt answered the phone and expressed that she is doing well at this time  Pt is staying at her daughter's and states that she has a lot of support in the home  HIRAM HARRISON asked Pt about reestablishing care with [facility] however Pt states that at this time she would like to focus on her physical health and rehab  HIRAM HARRISON expressed understanding  Pt has apt with PCP on 6/20  Pt addresssed no further needs social needs at this time  HIRAM HARRISON will remain available for further assistance as needed

## 2022-06-03 NOTE — PATIENT INSTRUCTIONS
Reduce ingrezza to 60mg/day-new prescription provided  Get EKG in 1-2 weeks after reducing the medication  Make sure to prevent falls  Let us know if there is any repeat seizure like activity  I will speak with the surgeon regarding the upcoming surgery  Contact your primary care physician for refill of lyrica-it would be a good idea to be on a lower dose if this is restarted  I do encourage you to get in PT to build strength  Follow up in 2 months time

## 2022-06-04 DIAGNOSIS — Z96.651 STATUS POST TOTAL KNEE REPLACEMENT, RIGHT: ICD-10-CM

## 2022-06-05 DIAGNOSIS — R56.9 SEIZURE-LIKE ACTIVITY (HCC): Primary | ICD-10-CM

## 2022-06-06 DIAGNOSIS — G24.01 TARDIVE DYSKINESIA: ICD-10-CM

## 2022-06-06 RX ORDER — PSEUDOEPHED/ACETAMINOPH/DIPHEN 30MG-500MG
TABLET ORAL
Qty: 180 TABLET | Refills: 0 | Status: SHIPPED | OUTPATIENT
Start: 2022-06-06

## 2022-06-06 NOTE — TELEPHONE ENCOUNTER
Received a call from [facility] ([place], [place]) requesting prescription for ingrezza 60 mg   They state patient made them aware she is running out of medication shortly     Called patient, she confirmed she would like her script sent to [facility] order below, please review and sign if agreeable

## 2022-06-07 ENCOUNTER — TELEPHONE (OUTPATIENT)
Dept: OTHER | Facility: OTHER | Age: 57
End: 2022-06-07

## 2022-06-07 ENCOUNTER — TELEPHONE (OUTPATIENT)
Dept: NEUROLOGY | Facility: CLINIC | Age: 57
End: 2022-06-07

## 2022-06-07 NOTE — TELEPHONE ENCOUNTER
[id number] Shanna Dawkins sent  Thank you for finding the pharmacy; I had trouble finding it during the visit on EMR

## 2022-06-08 ENCOUNTER — OFFICE VISIT (OUTPATIENT)
Dept: LAB | Facility: HOSPITAL | Age: 57
End: 2022-06-08
Payer: MEDICARE

## 2022-06-08 DIAGNOSIS — R94.31 QT PROLONGATION: ICD-10-CM

## 2022-06-08 DIAGNOSIS — G24.01 TARDIVE DYSKINESIA: ICD-10-CM

## 2022-06-08 LAB
ATRIAL RATE: 65 BPM
P AXIS: 77 DEGREES
PR INTERVAL: 114 MS
QRS AXIS: 85 DEGREES
QRSD INTERVAL: 86 MS
QT INTERVAL: 448 MS
QTC INTERVAL: 465 MS
T WAVE AXIS: 11 DEGREES
VENTRICULAR RATE: 65 BPM

## 2022-06-08 PROCEDURE — 93010 ELECTROCARDIOGRAM REPORT: CPT | Performed by: INTERNAL MEDICINE

## 2022-06-08 PROCEDURE — 93005 ELECTROCARDIOGRAM TRACING: CPT

## 2022-06-09 ENCOUNTER — TELEPHONE (OUTPATIENT)
Dept: FAMILY MEDICINE CLINIC | Facility: CLINIC | Age: 57
End: 2022-06-09

## 2022-06-14 ENCOUNTER — TELEPHONE (OUTPATIENT)
Dept: INTERNAL MEDICINE CLINIC | Facility: CLINIC | Age: 57
End: 2022-06-14

## 2022-06-14 NOTE — TELEPHONE ENCOUNTER
[facility]- [facility]    Name of Form- [facility] Pre-Op Clearance    Form to be filled out by- Dr Christina Martinez    Form to be Faxed [phone]

## 2022-06-16 ENCOUNTER — PATIENT OUTREACH (OUTPATIENT)
Dept: INTERNAL MEDICINE CLINIC | Facility: CLINIC | Age: 57
End: 2022-06-16

## 2022-06-16 DIAGNOSIS — M17.12 PRIMARY OSTEOARTHRITIS OF LEFT KNEE: ICD-10-CM

## 2022-06-16 RX ORDER — FOLIC ACID 1 MG/1
TABLET ORAL
Qty: 90 TABLET | Refills: 1 | Status: SHIPPED | OUTPATIENT
Start: 2022-06-16

## 2022-06-16 NOTE — PROGRESS NOTES
Outpatient Care Management Note:    I called and spoke with patient  She reports she is feeling ok at this time  I did make her aware she is scheduled twice next week with PCP office  Patient aware she only needs 1 appointment and will be seen Monday 6/20 @ 4 pm and appointment for Thursday 6/23 @ 2 pm will be cancelled  Patient instructed to bring all medication with her to appointment  Patient is scheduled for left knee replacement on 7/5/22  Patient encouraged to do outpatient physical therapy in meantime to build up her strength  Patient reports currently not receiving any physical therapy  Patient encouraged to take recommendations of physical therapy in the hospital after her surgery if she should go home or go to short term rehab  Patient reports her goal is to return home but will need to see how she does  Again encouraged PT in the meantime  Patient recently seen by Neurology on 6/3/22 and Ignacio Дмитрийlaurence was reduced from 80 mg to 60 mg daily  Recommended lower dose of lyrica if needs to be refilled (managaged by PCP office)  Patient reports she has all her medication at this time and is managing her own medication  I again requested she bring all her medication bottles to upcoming appointment on Monday  She reports she will do her best to bring them  Patient continues to live with her daughter and son in law  Son in law is paid caregiver  Daughter has several young children in the home  Patient does not have any further questions, concerns, or other needs at this time  Pt has my contact # and PCP office # if needed  Pt is agreeable for further outreach

## 2022-06-19 NOTE — PROGRESS NOTES
[facility] Visit Note  Samantha Morales Gain 60 y o  female   MRN: [id number]    Assessment and Plan      1  Preoperative evaluation to rule out surgical contraindication  Assessment & Plan:  Patient to undergo left TKA on 07/05/2022  RCRI is 1  Patient is able to complete >4 METS  Patient has no history of ischemic heart disease  Cardiac studies including previous TTE and EKGs have been reviewed  Patient is otherwise medically optimized and there is no contraindication at this time for surgery  2  Recommended medication schedule not followed     Assessment & Plan:   She only brought a few of her medications to the appointment today  She was instructed at last visit and during phone calls to bring in all medications  I am unsure which medications she is taking and how much she is taking  I explained to her the importance of reconciling her medications and she verbalized understanding  She is frustrated about going to the doctor so frequently, and I explained that she would not have to go as frequently if she would bring all her medications to her appointment so we can review them  I also explained that without knowing her medications, this puts her at increased risk of medication side effects, polypharmacy related effects including overdose, etc  She verbalized understanding  I will schedule patient for an appointment at her earliest convenience with a provider so that we can perform a medication reconciliation  She verbalized understanding of this plan and states she will bring all of her medications she is taking to that appointment  3  Essential hypertension: BP elevated in office today 148/90 and 148/69 on repeat  She reports taking amlodipine 5 mg daily  Will increase amlodipine to 10 mg daily  - amlodipine (NORVASC) 10 mg daily     4   Gastroesophageal reflux disease, unspecified whether esophagitis present  -     omeprazole (PriLOSEC) 20 mg delayed OCT and exam stable from last visit. Last treatment in [facility] 11/29/21. release capsule; Take 1 capsule (20 mg total) by mouth daily    5  Hypertension, unspecified type  -     amLODIPine (NORVASC) 10 mg tablet; Take 1 tablet (10 mg total) by mouth daily    6  Medical clearance for psychiatric admission  -     lithium carbonate (LITHOBID) 300 mg CR tablet; Take 1 tablet (300 mg total) by mouth daily at bedtime          Follow up in our clinic in ASAP to reconcile medications  Subjective   HISTORY OF PRESENT ILLNESS:  Samantha Yun is a 60 y o  female with a past medical history of severe bilateral knee osteoarthritis, chronic pain syndrome, opioid dependence, tardive dyskinesia, hypertension, hyperlipidemia, and prior CVA who presents to clinic today for preoperative risk assessment  Surgery to undergo: Left total knee arthroplasty   Date: 7/5/22  Risk of procedure: High   · No history of ischemic heart disease  · Not on insulin   · Prior cardiac studies:   ? 7/1/21 TTE: EF 61%, no WMA, no valvular pathology   ? 6/8/22 EKG: NSR   ? No catheterization/stress tests     Patient has been instructed to participate in outpatient PT, however she reports currently not receiving any PT  Patient encouraged to participate in therapy in the hospital after her surgery if she should go home or go to [facility]  Patient reports her goal is to return home but will need to see how she does      Patient recently seen by Neurology on 6/3/22 and Evaristo Constant was reduced from 80 mg to 60 mg daily  Recommended lower dose of lyrica if needs to be refilled (managaged by PCP office)  Patient did not bring all of her medications with her to today's office visit  She reports she is still taking the 80 mg of ingreza  I instructed patient that it is important to reconcile her medications and that a bubble pack would be helpful for her        Patient continues to live with her daughter and son in law  Son in law is paid caregiver  Daughter has several young children in the home        She only brought a few of her medications to the appointment today  She was instructed at last visit and during phone calls to bring in all medications  I am unsure which medications she is taking and how much she is taking  I explained to her the importance of reconciling her medications and she verbalized understanding  She is frustrated about going to the doctor so frequently, and I explained that she would not have to go as frequently if she would bring all her medications to her appointment so we can review them  I also explained that without knowing her medications, this puts her at increased risk of medication side effects, polypharmacy related effects including overdose, etc  She verbalized understanding  I will schedule patient for an appointment at her earliest convenience with a provider so that we can perform a medication reconciliation  She verbalized understanding of this plan and states she will bring all of her medications she is taking to that appointment  Review of Systems   Constitutional: Negative for chills, fatigue, fever and unexpected weight change  HENT: Negative for congestion, rhinorrhea, sinus pain and sore throat  Eyes: Negative for visual disturbance  Respiratory: Negative for cough and shortness of breath  Cardiovascular: Negative for chest pain, palpitations and leg swelling  Gastrointestinal: Negative for abdominal pain, blood in stool, constipation, diarrhea, nausea and vomiting  Endocrine: Negative for cold intolerance, heat intolerance, polydipsia and polyuria  Genitourinary: Negative for difficulty urinating, dysuria, frequency, hematuria and urgency  Musculoskeletal: Positive for arthralgias (knee pain, back pain, hip pain)  Negative for back pain and myalgias  Skin: Negative for rash and wound  Neurological: Negative for dizziness, syncope, weakness, light-headedness and headaches  Psychiatric/Behavioral: Negative for behavioral problems, confusion and sleep disturbance  Objective     Vitals:    06/20/22 1548 06/20/22 1633   BP: 148/90 148/69   BP Location: Left arm Left arm   Patient Position: Sitting Sitting   Cuff Size: Standard Standard   Pulse: 82 84   Temp: 97 9 °F (36 6 °C)    TempSrc: Temporal    SpO2: 100%    Weight: 82 9 kg (182 lb 12 8 oz)      Physical Exam:  General: Obese  No apparent distress, resting comfortably   Head: Normocephalic, atraumatic  Eyes: Anicteric, no conjunctival erythema  ENT: External ear normal, no nasal discharge  Neck: Trachea midline, no visible lymphadenopathy or goiter  Respiratory: Non-labored respirations, symmetric thorax expansion  Cardiovascular: RRR  Extremities appear well-perfused  Abdomen: Non-distended  Extremities: Antalgic gait  Moves extremities spontaneously, no peripheral edema  Skin: No visible rashes, wounds, or jaundice  Neuro: Tardive dyskinesia   A&O x 3, no gross focal deficits, no aphasia      History     Current Outpatient Medications:     amLODIPine (NORVASC) 10 mg tablet, Take 1 tablet (10 mg total) by mouth daily, Disp: 60 tablet, Rfl: 0    benztropine (COGENTIN) 1 mg tablet, Take 1 mg by mouth 2 (two) times a day, Disp: , Rfl:     busPIRone (BUSPAR) 10 mg tablet, Take 20 mg by mouth 3 (three) times a day , Disp: , Rfl:     hydrOXYzine HCL (ATARAX) 50 mg tablet, Take 50 mg by mouth 3 (three) times a day as needed for itching, Disp: , Rfl:     lithium carbonate (LITHOBID) 300 mg CR tablet, Take 1 tablet (300 mg total) by mouth daily at bedtime, Disp: 30 tablet, Rfl: 0    LORazepam (ATIVAN) 0 5 mg tablet, Take 1 tablet (0 5 mg total) by mouth 2 (two) times a day as needed for anxiety for up to 10 days, Disp: 10 tablet, Rfl: 0    omeprazole (PriLOSEC) 20 mg delayed release capsule, Take 1 capsule (20 mg total) by mouth daily, Disp: 60 capsule, Rfl: 0    PARoxetine (PAXIL) 30 mg tablet, Take 2 tablets (60 mg total) by mouth daily, Disp: 180 tablet, Rfl: 3    pregabalin (LYRICA) 150 mg capsule, Take 1 capsule (150 mg total) by mouth 3 (three) times a day, Disp: 120 capsule, Rfl: 1    Valbenazine Tosylate 60 MG CAPS, Take 60 mg by mouth in the morning, Disp: 30 capsule, Rfl: 3    Acetaminophen Extra Strength 500 MG tablet, TAKE 2 TABLETS (1,000 MG TOTAL) BY MOUTH 3 (THREE) TIMES A DAY, Disp: 180 tablet, Rfl: 0    ascorbic acid (VITAMIN C) 500 MG tablet, Take 1 tablet (500 mg total) by mouth daily, Disp: 30 tablet, Rfl: 1    aspirin (ECOTRIN) 325 mg EC tablet, TAKE 1 TABLET BY MOUTH TWICE A DAY WITH FOOD STOP ASPIRIN 81 MG WHILE TAKING THIS, Disp: 56 tablet, Rfl: 3    atorvastatin (LIPITOR) 40 mg tablet, TAKE 1 TABLET BY MOUTH EVERY DAY, Disp: 90 tablet, Rfl: 3    Calcium Ascorbate (VITAMIN C) 500 mg tablet, Take 1 tablet (500 mg total) by mouth daily, Disp: 30 tablet, Rfl: 1    calcium carbonate (TUMS) 500 mg chewable tablet, Chew 2 tablets every 8 (eight) hours as needed, Disp: , Rfl:     ferrous sulfate 324 (65 Fe) mg, TAKE 1 TABLET (324 MG TOTAL) BY MOUTH IN THE MORNING, Disp: 90 tablet, Rfl: 1    ferrous sulfate 324 (65 Fe) mg, Take 1 tablet (324 mg total) by mouth daily, Disp: 30 tablet, Rfl: 1    folic acid (FOLVITE) 1 mg tablet, Take 1 tablet (1 mg total) by mouth daily, Disp: 30 tablet, Rfl: 1    folic acid (FOLVITE) 1 mg tablet, TAKE 1 TABLET BY MOUTH EVERY DAY, Disp: 90 tablet, Rfl: 1    Multiple Vitamin (multivitamin) tablet, Take 1 tablet by mouth daily, Disp: 30 tablet, Rfl: 1    naloxone (NARCAN) 4 mg/0 1 mL nasal spray, Administer 1 spray into a nostril   If no response after 2-3 minutes, give another dose in the other nostril using a new spray , Disp: 1 each, Rfl: 1    potassium chloride (K-DUR,KLOR-CON) 10 mEq tablet, Take 30 mEq by mouth in the morning, Disp: , Rfl:     prazosin (MINIPRESS) 2 mg capsule, Take 1 capsule (2 mg total) by mouth daily at bedtime, Disp: 30 capsule, Rfl: 0    propranolol (INDERAL) 10 mg tablet, Take 1 tablet (10 mg total) by mouth 2 (two) times a day before breakfast and lunch, Disp: , Rfl: 0    QUEtiapine (SEROquel) 25 mg tablet, Take 2 tablets (50 mg total) by mouth daily at bedtime, Disp: 180 tablet, Rfl: 3    senna (SENOKOT) 8 6 MG tablet, Take 1 tablet by mouth daily as needed, Disp: , Rfl:   No Known Allergies   Past Medical History:   Diagnosis Date    Acid reflux     Anxiety     RESOLVED: [id number]    Arthritis     Bipolar 2 disorder (HCC)     FOLLOWS WITH PSYCHIATRIST  CONTINUE LAMOTRIGINE; RESOLVED: [id number]    Depression     Familial tremor     both hands    Fibromyalgia     LAST ASSESSED: [id number]    GERD (gastroesophageal reflux disease)     Hearing aid worn     left ear    [place] (hard of hearing)     left ear    Hyperlipidemia     Hypertension     Left-sided weakness     Lower back pain     Memory loss of unknown cause     long and short term    Migraine     Obesity     Obesity, Class II, BMI 35-39 9     Overactive bladder     Panic attack     Post traumatic stress disorder     Seasonal allergies     Stroke 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    CONT ASA 81 MG DAILY; RESOLVED: [id number]    Urinary incontinence     Wears dentures     partial lower / full upper    Wears glasses      Past Surgical History:   Procedure Laterality Date    BACK SURGERY       SECTION      COLONOSCOPY      RESOLVED: [id number]    EAR SURGERY      EGD      HYSTERECTOMY      MYRINGOTOMY W/ TUBES Left     NECK SURGERY  2019    NJ CYSTOURETHROSCOPY N/A 2016    Procedure: CYSTOSCOPY, BOTOX INJECTION;  Surgeon: Ronak Morales MD;  Location: [facility];  Service: Gynecology    NJ IMPLANT SPINAL NEUROSTIM/ Right 2/10/2021    Procedure: REPLACEMENT IMPLANTABLE PULSE GENERATOR DORSAL SPINAL COLUMN STIMULATOR, RIGHT;  Surgeon: Randal Lu MD;  [facility];  Service: Neurosurgery    NJ PERCUT IMPLNT Jackquline Lora Right 2020 Procedure: INSERTION THORACIC DORSAL COLUMN SPINAL CORD STIMULATOR PERCUTANEOUS W IMPLANTABLE PULSE GENERATOR, RIGHT;  Surgeon: Martin Castaneda MD;  Location: [facility];  Service: Neurosurgery    VA TOTAL KNEE ARTHROPLASTY Right 3/9/2022    Procedure: ARTHROPLASTY KNEE TOTAL;  Surgeon: Analia Warren DO;  [facility];  Service: Orthopedics    TONSILLECTOMY     [address] [place]    UPPER GASTROINTESTINAL ENDOSCOPY  09/2020     Social History     Socioeconomic History    Marital status:      Spouse name: Not on file    Number of children: 2    Years of education: graduate school     Highest education level: Not on file   Occupational History    Occupation: Disabled   Tobacco Use    Smoking status: Never Smoker    Smokeless tobacco: Never Used   Vaping Use    Vaping Use: Never used   Substance and Sexual Activity    Alcohol use: Not Currently     Comment: 2 x year; being a social drinker as per all scripts     Drug use: No    Sexual activity: Not Currently   Other Topics Concern    Not on file   Social History Narrative    Bereavement    Daily caffeine consumption, 6-8 servings per day     as per all scripts    Lives in [address] [place] [address] Determinants of Health     Financial Resource Strain: Low Risk     Difficulty of Paying Living Expenses: Not hard at all   Food Insecurity: No Food Insecurity    Worried About [address] in the Last Year: Never true    [address] in the Last Year: Never true   Transportation Needs: No Transportation Needs    Lack of Transportation (Medical): No    Lack of Transportation (Non-Medical):  No   Physical Activity: Not on file   Stress: Not on file   Social Connections: Not on file   Intimate Partner Violence: Not on file   Housing Stability: Low Risk     Unable to Pay for Housing in the Last Year: No    Number of Places Lived in the Last Year: 1    Unstable Housing in the Last Year: No     Family History Problem Relation Age of Onset    Colon cancer Mother     Alzheimer's disease Father     Stroke Father     Colon cancer Brother     Bipolar disorder Brother     Breast cancer Maternal Aunt     Colon cancer Maternal Aunt     Heart disease Other     Diabetes Other     Hypertension Other     Seizures Son     Depression Paternal Grandfather     No Known Problems Sister     No Known Problems Brother     Thyroid disease Neg Hx          Glo Valente DO  [facility] Internal Medicine PGY-1  [address] [facility]  [address], [address] [facility]    PLEASE NOTE:  This encounter was completed utilizing the [email or web address] Direct Speech Voice Recognition Software  Grammatical errors, random word insertions, pronoun errors and incomplete sentences are occasional consequences of the system due to software limitations, ambient noise and hardware issues  These may be missed by proof reading prior to affixing electronic signature  Any questions or concerns about the content, text or information contained within the body of this dictation should be directly addressed to the physician for clarification  Please do not hesitate to call me directly if you have any any questions or concerns

## 2022-06-19 NOTE — ASSESSMENT & PLAN NOTE
Patient to undergo left TKA on 07/05/2022  RCRI is 1  Patient is able to complete >4 METS  Patient has no history of ischemic heart disease  Cardiac studies including previous TTE and EKGs have been reviewed  Patient is otherwise medically optimized and there is no contraindication at this time for surgery

## 2022-06-19 NOTE — H&P (VIEW-ONLY)
[facility] Visit Note  Samantha Yanez 60 y o  female   MRN: [id number]    Assessment and Plan      1  Preoperative evaluation to rule out surgical contraindication  Assessment & Plan:  Patient to undergo left TKA on 07/05/2022  RCRI is 1  Patient is able to complete >4 METS  Patient has no history of ischemic heart disease  Cardiac studies including previous TTE and EKGs have been reviewed  Patient is otherwise medically optimized and there is no contraindication at this time for surgery  2  Recommended medication schedule not followed     Assessment & Plan:   She only brought a few of her medications to the appointment today  She was instructed at last visit and during phone calls to bring in all medications  I am unsure which medications she is taking and how much she is taking  I explained to her the importance of reconciling her medications and she verbalized understanding  She is frustrated about going to the doctor so frequently, and I explained that she would not have to go as frequently if she would bring all her medications to her appointment so we can review them  I also explained that without knowing her medications, this puts her at increased risk of medication side effects, polypharmacy related effects including overdose, etc  She verbalized understanding  I will schedule patient for an appointment at her earliest convenience with a provider so that we can perform a medication reconciliation  She verbalized understanding of this plan and states she will bring all of her medications she is taking to that appointment  3  Essential hypertension: BP elevated in office today 148/90 and 148/69 on repeat  She reports taking amlodipine 5 mg daily  Will increase amlodipine to 10 mg daily  - amlodipine (NORVASC) 10 mg daily     4   Gastroesophageal reflux disease, unspecified whether esophagitis present  -     omeprazole (PriLOSEC) 20 mg delayed release capsule; Take 1 capsule (20 mg total) by mouth daily    5  Hypertension, unspecified type  -     amLODIPine (NORVASC) 10 mg tablet; Take 1 tablet (10 mg total) by mouth daily    6  Medical clearance for psychiatric admission  -     lithium carbonate (LITHOBID) 300 mg CR tablet; Take 1 tablet (300 mg total) by mouth daily at bedtime          Follow up in our clinic in ASAP to reconcile medications  Subjective   HISTORY OF PRESENT ILLNESS:  Samantha Mejia is a 60 y o  female with a past medical history of severe bilateral knee osteoarthritis, chronic pain syndrome, opioid dependence, tardive dyskinesia, hypertension, hyperlipidemia, and prior CVA who presents to clinic today for preoperative risk assessment  Surgery to undergo: Left total knee arthroplasty   Date: 7/5/22  Risk of procedure: High   · No history of ischemic heart disease  · Not on insulin   · Prior cardiac studies:   ? 7/1/21 TTE: EF 61%, no WMA, no valvular pathology   ? 6/8/22 EKG: NSR   ? No catheterization/stress tests     Patient has been instructed to participate in outpatient PT, however she reports currently not receiving any PT  Patient encouraged to participate in therapy in the hospital after her surgery if she should go home or go to [facility]  Patient reports her goal is to return home but will need to see how she does      Patient recently seen by Neurology on 6/3/22 and Micha Bolls was reduced from 80 mg to 60 mg daily  Recommended lower dose of lyrica if needs to be refilled (managaged by PCP office)  Patient did not bring all of her medications with her to today's office visit  She reports she is still taking the 80 mg of ingreza  I instructed patient that it is important to reconcile her medications and that a bubble pack would be helpful for her        Patient continues to live with her daughter and son in law  Son in law is paid caregiver  Daughter has several young children in the home        She only brought a few of her medications to the appointment today  She was instructed at last visit and during phone calls to bring in all medications  I am unsure which medications she is taking and how much she is taking  I explained to her the importance of reconciling her medications and she verbalized understanding  She is frustrated about going to the doctor so frequently, and I explained that she would not have to go as frequently if she would bring all her medications to her appointment so we can review them  I also explained that without knowing her medications, this puts her at increased risk of medication side effects, polypharmacy related effects including overdose, etc  She verbalized understanding  I will schedule patient for an appointment at her earliest convenience with a provider so that we can perform a medication reconciliation  She verbalized understanding of this plan and states she will bring all of her medications she is taking to that appointment  Review of Systems   Constitutional: Negative for chills, fatigue, fever and unexpected weight change  HENT: Negative for congestion, rhinorrhea, sinus pain and sore throat  Eyes: Negative for visual disturbance  Respiratory: Negative for cough and shortness of breath  Cardiovascular: Negative for chest pain, palpitations and leg swelling  Gastrointestinal: Negative for abdominal pain, blood in stool, constipation, diarrhea, nausea and vomiting  Endocrine: Negative for cold intolerance, heat intolerance, polydipsia and polyuria  Genitourinary: Negative for difficulty urinating, dysuria, frequency, hematuria and urgency  Musculoskeletal: Positive for arthralgias (knee pain, back pain, hip pain)  Negative for back pain and myalgias  Skin: Negative for rash and wound  Neurological: Negative for dizziness, syncope, weakness, light-headedness and headaches  Psychiatric/Behavioral: Negative for behavioral problems, confusion and sleep disturbance  Objective     Vitals:    06/20/22 1548 06/20/22 1633   BP: 148/90 148/69   BP Location: Left arm Left arm   Patient Position: Sitting Sitting   Cuff Size: Standard Standard   Pulse: 82 84   Temp: 97 9 °F (36 6 °C)    TempSrc: Temporal    SpO2: 100%    Weight: 82 9 kg (182 lb 12 8 oz)      Physical Exam:  General: Obese  No apparent distress, resting comfortably   Head: Normocephalic, atraumatic  Eyes: Anicteric, no conjunctival erythema  ENT: External ear normal, no nasal discharge  Neck: Trachea midline, no visible lymphadenopathy or goiter  Respiratory: Non-labored respirations, symmetric thorax expansion  Cardiovascular: RRR  Extremities appear well-perfused  Abdomen: Non-distended  Extremities: Antalgic gait  Moves extremities spontaneously, no peripheral edema  Skin: No visible rashes, wounds, or jaundice  Neuro: Tardive dyskinesia   A&O x 3, no gross focal deficits, no aphasia      History     Current Outpatient Medications:     amLODIPine (NORVASC) 10 mg tablet, Take 1 tablet (10 mg total) by mouth daily, Disp: 60 tablet, Rfl: 0    benztropine (COGENTIN) 1 mg tablet, Take 1 mg by mouth 2 (two) times a day, Disp: , Rfl:     busPIRone (BUSPAR) 10 mg tablet, Take 20 mg by mouth 3 (three) times a day , Disp: , Rfl:     hydrOXYzine HCL (ATARAX) 50 mg tablet, Take 50 mg by mouth 3 (three) times a day as needed for itching, Disp: , Rfl:     lithium carbonate (LITHOBID) 300 mg CR tablet, Take 1 tablet (300 mg total) by mouth daily at bedtime, Disp: 30 tablet, Rfl: 0    LORazepam (ATIVAN) 0 5 mg tablet, Take 1 tablet (0 5 mg total) by mouth 2 (two) times a day as needed for anxiety for up to 10 days, Disp: 10 tablet, Rfl: 0    omeprazole (PriLOSEC) 20 mg delayed release capsule, Take 1 capsule (20 mg total) by mouth daily, Disp: 60 capsule, Rfl: 0    PARoxetine (PAXIL) 30 mg tablet, Take 2 tablets (60 mg total) by mouth daily, Disp: 180 tablet, Rfl: 3    pregabalin (LYRICA) 150 mg capsule, Take 1 capsule (150 mg total) by mouth 3 (three) times a day, Disp: 120 capsule, Rfl: 1    Valbenazine Tosylate 60 MG CAPS, Take 60 mg by mouth in the morning, Disp: 30 capsule, Rfl: 3    Acetaminophen Extra Strength 500 MG tablet, TAKE 2 TABLETS (1,000 MG TOTAL) BY MOUTH 3 (THREE) TIMES A DAY, Disp: 180 tablet, Rfl: 0    ascorbic acid (VITAMIN C) 500 MG tablet, Take 1 tablet (500 mg total) by mouth daily, Disp: 30 tablet, Rfl: 1    aspirin (ECOTRIN) 325 mg EC tablet, TAKE 1 TABLET BY MOUTH TWICE A DAY WITH FOOD STOP ASPIRIN 81 MG WHILE TAKING THIS, Disp: 56 tablet, Rfl: 3    atorvastatin (LIPITOR) 40 mg tablet, TAKE 1 TABLET BY MOUTH EVERY DAY, Disp: 90 tablet, Rfl: 3    Calcium Ascorbate (VITAMIN C) 500 mg tablet, Take 1 tablet (500 mg total) by mouth daily, Disp: 30 tablet, Rfl: 1    calcium carbonate (TUMS) 500 mg chewable tablet, Chew 2 tablets every 8 (eight) hours as needed, Disp: , Rfl:     ferrous sulfate 324 (65 Fe) mg, TAKE 1 TABLET (324 MG TOTAL) BY MOUTH IN THE MORNING, Disp: 90 tablet, Rfl: 1    ferrous sulfate 324 (65 Fe) mg, Take 1 tablet (324 mg total) by mouth daily, Disp: 30 tablet, Rfl: 1    folic acid (FOLVITE) 1 mg tablet, Take 1 tablet (1 mg total) by mouth daily, Disp: 30 tablet, Rfl: 1    folic acid (FOLVITE) 1 mg tablet, TAKE 1 TABLET BY MOUTH EVERY DAY, Disp: 90 tablet, Rfl: 1    Multiple Vitamin (multivitamin) tablet, Take 1 tablet by mouth daily, Disp: 30 tablet, Rfl: 1    naloxone (NARCAN) 4 mg/0 1 mL nasal spray, Administer 1 spray into a nostril   If no response after 2-3 minutes, give another dose in the other nostril using a new spray , Disp: 1 each, Rfl: 1    potassium chloride (K-DUR,KLOR-CON) 10 mEq tablet, Take 30 mEq by mouth in the morning, Disp: , Rfl:     prazosin (MINIPRESS) 2 mg capsule, Take 1 capsule (2 mg total) by mouth daily at bedtime, Disp: 30 capsule, Rfl: 0    propranolol (INDERAL) 10 mg tablet, Take 1 tablet (10 mg total) by mouth 2 (two) times a day before breakfast and lunch, Disp: , Rfl: 0    QUEtiapine (SEROquel) 25 mg tablet, Take 2 tablets (50 mg total) by mouth daily at bedtime, Disp: 180 tablet, Rfl: 3    senna (SENOKOT) 8 6 MG tablet, Take 1 tablet by mouth daily as needed, Disp: , Rfl:   No Known Allergies   Past Medical History:   Diagnosis Date    Acid reflux     Anxiety     RESOLVED: [id number]    Arthritis     Bipolar 2 disorder (HCC)     FOLLOWS WITH PSYCHIATRIST  CONTINUE LAMOTRIGINE; RESOLVED: [id number]    Depression     Familial tremor     both hands    Fibromyalgia     LAST ASSESSED: [id number]    GERD (gastroesophageal reflux disease)     Hearing aid worn     left ear    [place] (hard of hearing)     left ear    Hyperlipidemia     Hypertension     Left-sided weakness     Lower back pain     Memory loss of unknown cause     long and short term    Migraine     Obesity     Obesity, Class II, BMI 35-39 9     Overactive bladder     Panic attack     Post traumatic stress disorder     Seasonal allergies     Stroke [facility])     questionable stroke     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    CONT ASA 81 MG DAILY; RESOLVED: [id number]    Urinary incontinence     Wears dentures     partial lower / full upper    Wears glasses      Past Surgical History:   Procedure Laterality Date    BACK SURGERY       SECTION      COLONOSCOPY      RESOLVED: [id number]    EAR SURGERY      EGD      HYSTERECTOMY      MYRINGOTOMY W/ TUBES Left     NECK SURGERY  2019    VA CYSTOURETHROSCOPY N/A 2016    Procedure: CYSTOSCOPY, BOTOX INJECTION;  Surgeon: Andrea Curry MD;  [facility];  Service: Gynecology    VA IMPLANT SPINAL NEUROSTIM/ Right 2/10/2021    Procedure: REPLACEMENT IMPLANTABLE PULSE GENERATOR DORSAL SPINAL COLUMN STIMULATOR, RIGHT;  Surgeon: Mila Garrett MD;  [facility];  Service: Neurosurgery    VA PERCUT IMPLNT [address] Right 2020 Procedure: INSERTION THORACIC DORSAL COLUMN SPINAL CORD STIMULATOR PERCUTANEOUS W IMPLANTABLE PULSE GENERATOR, RIGHT;  Surgeon: Saintclair Sports, MD;  [facility];  Service: Neurosurgery    [place] TOTAL KNEE ARTHROPLASTY Right 3/9/2022    Procedure: ARTHROPLASTY KNEE TOTAL;  Surgeon: Mona Mir DO;  [facility];  Service: Orthopedics    TONSILLECTOMY     [address] [place]    UPPER GASTROINTESTINAL ENDOSCOPY  09/2020     Social History     Socioeconomic History    Marital status:      Spouse name: Not on file    Number of children: 2    Years of education: graduate school     Highest education level: Not on file   Occupational History    Occupation: Disabled   Tobacco Use    Smoking status: Never Smoker    Smokeless tobacco: Never Used   Vaping Use    Vaping Use: Never used   Substance and Sexual Activity    Alcohol use: Not Currently     Comment: 2 x year; being a social drinker as per all scripts     Drug use: No    Sexual activity: Not Currently   Other Topics Concern    Not on file   Social History Narrative    Bereavement    Daily caffeine consumption, 6-8 servings per day     as per all scripts    Lives in [address] [place] [address] Determinants of Health     Financial Resource Strain: Low Risk     Difficulty of Paying Living Expenses: Not hard at all   Food Insecurity: No Food Insecurity    Worried About [address] in the Last Year: Never true    [address] in the Last Year: Never true   Transportation Needs: No Transportation Needs    Lack of Transportation (Medical): No    Lack of Transportation (Non-Medical):  No   Physical Activity: Not on file   Stress: Not on file   Social Connections: Not on file   Intimate Partner Violence: Not on file   Housing Stability: Low Risk     Unable to Pay for Housing in the Last Year: No    Number of Places Lived in the Last Year: 1    Unstable Housing in the Last Year: No     Family History Problem Relation Age of Onset    Colon cancer Mother     Alzheimer's disease Father     Stroke Father     Colon cancer Brother     Bipolar disorder Brother     Breast cancer Maternal Aunt     Colon cancer Maternal Aunt     Heart disease Other     Diabetes Other     Hypertension Other     Seizures Son     Depression Paternal Grandfather     No Known Problems Sister     No Known Problems Brother     Thyroid disease Neg Hx          Glo Valente DO  [facility] Internal Medicine PGY-1  [address] [facility]  [address], [address] [facility]    PLEASE NOTE:  This encounter was completed utilizing the [employer] Direct Speech Voice Recognition Software  Grammatical errors, random word insertions, pronoun errors and incomplete sentences are occasional consequences of the system due to software limitations, ambient noise and hardware issues  These may be missed by proof reading prior to affixing electronic signature  Any questions or concerns about the content, text or information contained within the body of this dictation should be directly addressed to the physician for clarification  Please do not hesitate to call me directly if you have any any questions or concerns

## 2022-06-20 ENCOUNTER — CONSULT (OUTPATIENT)
Dept: INTERNAL MEDICINE CLINIC | Facility: CLINIC | Age: 57
End: 2022-06-20

## 2022-06-20 VITALS
HEART RATE: 84 BPM | BODY MASS INDEX: 35.7 KG/M2 | SYSTOLIC BLOOD PRESSURE: 148 MMHG | OXYGEN SATURATION: 100 % | TEMPERATURE: 97.9 F | WEIGHT: 182.8 LBS | DIASTOLIC BLOOD PRESSURE: 69 MMHG

## 2022-06-20 DIAGNOSIS — K21.9 GASTROESOPHAGEAL REFLUX DISEASE, UNSPECIFIED WHETHER ESOPHAGITIS PRESENT: ICD-10-CM

## 2022-06-20 DIAGNOSIS — I10 ESSENTIAL HYPERTENSION: ICD-10-CM

## 2022-06-20 DIAGNOSIS — Z91.14 RECOMMENDED MEDICATION SCHEDULE NOT FOLLOWED: ICD-10-CM

## 2022-06-20 DIAGNOSIS — Z01.818 PREOPERATIVE EVALUATION TO RULE OUT SURGICAL CONTRAINDICATION: Primary | ICD-10-CM

## 2022-06-20 DIAGNOSIS — Z00.8 MEDICAL CLEARANCE FOR PSYCHIATRIC ADMISSION: ICD-10-CM

## 2022-06-20 DIAGNOSIS — I10 HYPERTENSION, UNSPECIFIED TYPE: ICD-10-CM

## 2022-06-20 PROCEDURE — 3077F SYST BP >= 140 MM HG: CPT | Performed by: INTERNAL MEDICINE

## 2022-06-20 PROCEDURE — 99213 OFFICE O/P EST LOW 20 MIN: CPT | Performed by: INTERNAL MEDICINE

## 2022-06-20 PROCEDURE — 3078F DIAST BP <80 MM HG: CPT | Performed by: INTERNAL MEDICINE

## 2022-06-20 RX ORDER — OMEPRAZOLE 20 MG/1
20 CAPSULE, DELAYED RELEASE ORAL DAILY
Qty: 60 CAPSULE | Refills: 0 | Status: SHIPPED | OUTPATIENT
Start: 2022-06-20 | End: 2022-07-26

## 2022-06-20 RX ORDER — BENZTROPINE MESYLATE 1 MG/1
1 TABLET ORAL 2 TIMES DAILY
COMMUNITY
Start: 2022-04-12

## 2022-06-20 RX ORDER — LITHIUM CARBONATE 300 MG/1
300 TABLET, FILM COATED, EXTENDED RELEASE ORAL
Qty: 30 TABLET | Refills: 0 | Status: SHIPPED | OUTPATIENT
Start: 2022-06-20

## 2022-06-20 RX ORDER — OMEPRAZOLE 20 MG/1
20 CAPSULE, DELAYED RELEASE ORAL DAILY
COMMUNITY
End: 2022-06-20 | Stop reason: SDUPTHER

## 2022-06-20 RX ORDER — AMLODIPINE BESYLATE 10 MG/1
10 TABLET ORAL DAILY
Qty: 60 TABLET | Refills: 0 | Status: SHIPPED | OUTPATIENT
Start: 2022-06-20 | End: 2022-07-26

## 2022-06-21 ENCOUNTER — PATIENT OUTREACH (OUTPATIENT)
Dept: INTERNAL MEDICINE CLINIC | Facility: CLINIC | Age: 57
End: 2022-06-21

## 2022-06-21 ENCOUNTER — TELEPHONE (OUTPATIENT)
Dept: NEUROLOGY | Facility: CLINIC | Age: 57
End: 2022-06-21

## 2022-06-21 NOTE — PROGRESS NOTES
Outpatient Care Management Note:    Patient was at PCP office yesterday to pre-op clearance  Patient presented to the office by herself and only had several medication bottles with her and unable to tell provider what medication she is taking  Previously patient was offered bubble packing of medication but declined as she felt she could manage her own medication and did not want to change pharmacies  Will continue to discuss option of bubble packing with patient and family but would need to come into PCP office first for a nurse visit for medication reconciliation per myself and provider  , [place] and I attempted to reach daughter Carley Severe to follow up on concerns over patient attending appointments by herself and should be accompanied by a family member/caregiver and to follow up regarding medication  No answer and had to leave message requesting a call back  Left my contact number  We then call patient  She reports she got home ok yesterday after having difficulty with calling to arrange ride home through insurance   and provider assisted patient  She reports she did attend in person mental rimma appointment today  We requested if she can have her family call us  Stressed the need for a family member to be present with her at all appointments and need to bring medication with her to next PCP appointment  Patient reports she went to the ED today with [facility] for pain but reports is feeling better and unable to give specific info and limited view access in Epic of ED visit  We called patient's  Katheran Opitz at [phone] to provide update and make aware of upcoming surgery on 7/5/22  Message left  Please also see provider note from 6/20/22 and  note

## 2022-06-21 NOTE — TELEPHONE ENCOUNTER
Jesica Grajeda, nurse from dr Corbin Wright office (psychiatrist) called and states that they were previously prescribing ingrezza 80mg and then we saw pt and we lowered dose of ingrezza to 60mg  he is asking if we will be taking over this medication at this time  please advise  [phone] to leave detailed message

## 2022-06-21 NOTE — PROGRESS NOTES
HIRAM and Tran Glez RN/CM attempted to reach pt's dgt Kimanie to f/u re concern over pt's ability to go unaccompanied to her appointments  We had to leave a message for her to return our call  Yesterday after pt's PCP visit SW found her in our Lobby trying to call her Insurance carrier to get a return ride to home  Pt appeared unable to use her cell phone to do same  Sw and her PCP who also stopped to assist did call her Insurance carrier X2 and finally was able to get her lyft ride home  Her PCP has asked SW to see if pt can have a family member accompany pt to her appointments here as pt is not able to always tell us what medications she is taking and so family will know PCP's recommendations and assist with transportation as needed  We also reached out to pt who confirmed she got home okay   She has also mentioned she did get to her in person psych appointment today as well  We asked her to have her family call as us  In addition,CM Team called pt's Service Coordianator Mary Myers [phone] to provide an update but we had to leave a message  ADDENDUM:   Return call received by Mary Myers pt's   Hiram did alert him of pt's upcoming knee surgery and our concern she have family join her for her medical appointments and assist her with transportation to her appointments

## 2022-06-22 NOTE — TELEPHONE ENCOUNTER
MERCY Sinha  You 21 hours ago (1:02 PM)         I called Abbey Beth back and left a VM to call me back   I would say we could take it over but probably better for them to continue it as it appears she has cancelled a lot on us in the past and I am pretty sure the only reason she came this last time was for clearance for her surgery   She hopefully follows more regularly with her psychiatrist    I lowered the dose because of an interaction it has with paxil/concern for long QT   When he calls me back I can explain this to him    Ether Shearing

## 2022-06-23 ENCOUNTER — TELEPHONE (OUTPATIENT)
Dept: OBGYN CLINIC | Facility: HOSPITAL | Age: 57
End: 2022-06-23

## 2022-06-24 ENCOUNTER — TELEPHONE (OUTPATIENT)
Dept: OBGYN CLINIC | Facility: HOSPITAL | Age: 57
End: 2022-06-24

## 2022-06-28 ENCOUNTER — APPOINTMENT (OUTPATIENT)
Dept: LAB | Facility: HOSPITAL | Age: 57
DRG: 470 | End: 2022-06-28
Payer: MEDICARE

## 2022-06-28 DIAGNOSIS — M17.12 PRIMARY OSTEOARTHRITIS OF LEFT KNEE: ICD-10-CM

## 2022-06-28 DIAGNOSIS — Z01.818 PREOPERATIVE TESTING: ICD-10-CM

## 2022-06-28 DIAGNOSIS — Z01.818 ENCOUNTER FOR PREADMISSION TESTING: ICD-10-CM

## 2022-06-28 LAB
ABO GROUP BLD: NORMAL
ALBUMIN SERPL BCP-MCNC: 3.8 G/DL (ref 3.5–5)
ALP SERPL-CCNC: 118 U/L (ref 46–116)
ALT SERPL W P-5'-P-CCNC: 27 U/L (ref 12–78)
ANION GAP SERPL CALCULATED.3IONS-SCNC: 7 MMOL/L (ref 4–13)
APTT PPP: 32 SECONDS (ref 23–37)
AST SERPL W P-5'-P-CCNC: 13 U/L (ref 5–45)
BASOPHILS # BLD AUTO: 0.05 THOUSANDS/ΜL (ref 0–0.1)
BASOPHILS NFR BLD AUTO: 1 % (ref 0–1)
BILIRUB SERPL-MCNC: 1.22 MG/DL (ref 0.2–1)
BLD GP AB SCN SERPL QL: NEGATIVE
BUN SERPL-MCNC: 9 MG/DL (ref 5–25)
CALCIUM SERPL-MCNC: 8.8 MG/DL (ref 8.3–10.1)
CHLORIDE SERPL-SCNC: 101 MMOL/L (ref 100–108)
CO2 SERPL-SCNC: 32 MMOL/L (ref 21–32)
CREAT SERPL-MCNC: 0.91 MG/DL (ref 0.6–1.3)
CRP SERPL QL: 6 MG/L
EOSINOPHIL # BLD AUTO: 0.25 THOUSAND/ΜL (ref 0–0.61)
EOSINOPHIL NFR BLD AUTO: 4 % (ref 0–6)
ERYTHROCYTE [DISTWIDTH] IN BLOOD BY AUTOMATED COUNT: 14.5 % (ref 11.6–15.1)
EST. AVERAGE GLUCOSE BLD GHB EST-MCNC: 91 MG/DL
FERRITIN SERPL-MCNC: 96 NG/ML (ref 8–388)
GFR SERPL CREATININE-BSD FRML MDRD: 69 ML/MIN/1.73SQ M
GLUCOSE SERPL-MCNC: 94 MG/DL (ref 65–140)
HBA1C MFR BLD: 4.8 %
HCT VFR BLD AUTO: 44.1 % (ref 34.8–46.1)
HGB BLD-MCNC: 14.2 G/DL (ref 11.5–15.4)
IMM GRANULOCYTES # BLD AUTO: 0.02 THOUSAND/UL (ref 0–0.2)
IMM GRANULOCYTES NFR BLD AUTO: 0 % (ref 0–2)
INR PPP: 1 (ref 0.84–1.19)
IRON SATN MFR SERPL: 17 % (ref 15–50)
IRON SERPL-MCNC: 54 UG/DL (ref 50–170)
LYMPHOCYTES # BLD AUTO: 1.29 THOUSANDS/ΜL (ref 0.6–4.47)
LYMPHOCYTES NFR BLD AUTO: 22 % (ref 14–44)
MCH RBC QN AUTO: 28.6 PG (ref 26.8–34.3)
MCHC RBC AUTO-ENTMCNC: 32.2 G/DL (ref 31.4–37.4)
MCV RBC AUTO: 89 FL (ref 82–98)
MONOCYTES # BLD AUTO: 0.35 THOUSAND/ΜL (ref 0.17–1.22)
MONOCYTES NFR BLD AUTO: 6 % (ref 4–12)
NEUTROPHILS # BLD AUTO: 3.8 THOUSANDS/ΜL (ref 1.85–7.62)
NEUTS SEG NFR BLD AUTO: 67 % (ref 43–75)
NRBC BLD AUTO-RTO: 0 /100 WBCS
PLATELET # BLD AUTO: 342 THOUSANDS/UL (ref 149–390)
PMV BLD AUTO: 8.9 FL (ref 8.9–12.7)
POTASSIUM SERPL-SCNC: 3.6 MMOL/L (ref 3.5–5.3)
PROT SERPL-MCNC: 7.6 G/DL (ref 6.4–8.2)
PROTHROMBIN TIME: 13 SECONDS (ref 11.6–14.5)
RBC # BLD AUTO: 4.97 MILLION/UL (ref 3.81–5.12)
RH BLD: NEGATIVE
SODIUM SERPL-SCNC: 140 MMOL/L (ref 136–145)
SPECIMEN EXPIRATION DATE: NORMAL
TIBC SERPL-MCNC: 312 UG/DL (ref 250–450)
WBC # BLD AUTO: 5.76 THOUSAND/UL (ref 4.31–10.16)

## 2022-06-28 PROCEDURE — 36415 COLL VENOUS BLD VENIPUNCTURE: CPT

## 2022-06-28 PROCEDURE — 83540 ASSAY OF IRON: CPT

## 2022-06-28 PROCEDURE — 86140 C-REACTIVE PROTEIN: CPT

## 2022-06-28 PROCEDURE — 83550 IRON BINDING TEST: CPT

## 2022-06-28 PROCEDURE — 85025 COMPLETE CBC W/AUTO DIFF WBC: CPT

## 2022-06-28 PROCEDURE — 83036 HEMOGLOBIN GLYCOSYLATED A1C: CPT

## 2022-06-28 PROCEDURE — 86850 RBC ANTIBODY SCREEN: CPT

## 2022-06-28 PROCEDURE — 85730 THROMBOPLASTIN TIME PARTIAL: CPT

## 2022-06-28 PROCEDURE — 86900 BLOOD TYPING SEROLOGIC ABO: CPT

## 2022-06-28 PROCEDURE — 86901 BLOOD TYPING SEROLOGIC RH(D): CPT

## 2022-06-28 PROCEDURE — 85610 PROTHROMBIN TIME: CPT

## 2022-06-28 PROCEDURE — 80053 COMPREHEN METABOLIC PANEL: CPT

## 2022-06-28 PROCEDURE — 82728 ASSAY OF FERRITIN: CPT

## 2022-06-28 RX ORDER — PANTOPRAZOLE SODIUM 20 MG/1
20 TABLET, DELAYED RELEASE ORAL DAILY
COMMUNITY

## 2022-06-28 NOTE — PRE-PROCEDURE INSTRUCTIONS
Pre-Surgery Instructions:   Medication Instructions    Acetaminophen Extra Strength 500 MG tablet Uses PRN- OK to take day of surgery    ascorbic acid (VITAMIN C) 500 MG tablet Hold day of surgery   aspirin (ECOTRIN) 325 mg EC tablet Stop taking 7 days prior to surgery   atorvastatin (LIPITOR) 40 mg tablet Take day of surgery   benztropine (COGENTIN) 1 mg tablet Take day of surgery   busPIRone (BUSPAR) 10 mg tablet Take day of surgery   calcium carbonate (TUMS) 500 mg chewable tablet Hold day of surgery   ferrous sulfate 324 (65 Fe) mg Hold day of surgery   folic acid (FOLVITE) 1 mg tablet Stop taking 1 day prior to surgery   hydrOXYzine HCL (ATARAX) 50 mg tablet Uses PRN- OK to take day of surgery    lithium carbonate (LITHOBID) 300 mg CR tablet Take night before surgery    LORazepam (ATIVAN) 0 5 mg tablet Uses PRN- OK to take day of surgery    Multiple Vitamin (multivitamin) tablet Hold day of surgery   naloxone (NARCAN) 4 mg/0 1 mL nasal spray Hold day of surgery   pantoprazole (PROTONIX) 20 mg tablet Take day of surgery   PARoxetine (PAXIL) 30 mg tablet Take day of surgery   potassium chloride (K-DUR,KLOR-CON) 10 mEq tablet Hold day of surgery   prazosin (MINIPRESS) 2 mg capsule Take day of surgery   propranolol (INDERAL) 10 mg tablet Take day of surgery   QUEtiapine (SEROquel) 25 mg tablet Take night before surgery    senna (SENOKOT) 8 6 MG tablet Hold day of surgery   Valbenazine Tosylate 60 MG CAPS Take day of surgery  Verbal pre-op instructions given to daughter and son in law via phone  Daughter and son in law verbalize understanding

## 2022-06-29 ENCOUNTER — ANESTHESIA EVENT (OUTPATIENT)
Dept: PERIOP | Facility: HOSPITAL | Age: 57
DRG: 470 | End: 2022-06-29
Payer: MEDICARE

## 2022-06-30 NOTE — TELEPHONE ENCOUNTER
Preoperative Elective Admission Assessment- spoke to patient     [address]     Living Situation:   Who does pt live with: relative- daughter and her    What kind of home: multi-level  How do they enter the home: front  How many levels in home: 2 levels    # of steps to enter home: 8 to enter with railing   # of steps to second floor: N/A - stays first floor    Are there handrails: Yes  Sleeping arrangement: first/entry floor  Where is Bathroom: First floor, step in tub, with bench        First Floor Setup:   Is there a bathroom: Yes  Where would pt sleep: bed     DME: frame walker     Patient's Current Level of Function: Ambulates with walker and ADLs: Requires assistance    Post-op Caregiver: Son in [place]  She reports "he is my helper" through the waiver program   Also reports her daughter would assist    Caregiver Name and phone number for Inpatient discharge needs: Son in law and Sadiq Gonzales ([phone]   Currently receive any HHC/aides/community supports: Yes- through waiver program gets support, her son in law is the caregiver      Post-op Transport: Son in law and daughter   To/from hospital: child  To/from PT 2-3x/week: child  Uses community transport now:NO    Outpatient Physical Therapy Site:  Site: [facility]   pre and post-op appts scheduled? No--- - will need set up if ordered home PT- anticipating HHC     Medication Management: self, with assistance and pillbox, patient reports she does, but showed her son in law how to do it also  Preferred Pharmacy for Post-op Medications: [facility]   Blood Management Vitamin Regimen: Pt confirms taking as prescribed  Post-op anticoagulant: to be determined by surgical team postoperatively     DC Plan: Pt plans to be discharged home  Pt educated that our goal, if at all possible, is to appropriately discharge patient based off their post-op function while striving to maintain maximal independence   If possible, the goal is to discharge patient to home and for them to attend outpatient physical therapy  Barriers to DC identified preoperatively: Geni  Previous [facility] stay- does not want to return- plans to return home  BMI: 35 54    [employer]: declined enrollment    Patient Education:  Pt educated on post-op pain, early mobilization (POD0), Average inpt LOS, OP PT goal   Patient educated that our goal is to appropriately discharge patient based off their post-op function while striving to maintain maximal independence  The goal is to discharge patient to home and for them to attend outpatient physical therapy

## 2022-06-30 NOTE — TELEPHONE ENCOUNTER
Spoke to daughter,Tunde-  she confirmed they will be in support of patient discharging home after surgery, and will have support and transportation to and from appts  Carmen Villanueva also reports the patients brother is here now from [facility] to be an additional set of hands, and specializes in healthcare  Will update surgeon

## 2022-07-01 ENCOUNTER — TELEPHONE (OUTPATIENT)
Dept: OBGYN CLINIC | Facility: MEDICAL CENTER | Age: 57
End: 2022-07-01

## 2022-07-01 DIAGNOSIS — Z11.59 SCREENING FOR VIRAL DISEASE: ICD-10-CM

## 2022-07-01 PROCEDURE — U0005 INFEC AGEN DETEC AMPLI PROBE: HCPCS | Performed by: ORTHOPAEDIC SURGERY

## 2022-07-01 PROCEDURE — U0003 INFECTIOUS AGENT DETECTION BY NUCLEIC ACID (DNA OR RNA); SEVERE ACUTE RESPIRATORY SYNDROME CORONAVIRUS 2 (SARS-COV-2) (CORONAVIRUS DISEASE [COVID-19]), AMPLIFIED PROBE TECHNIQUE, MAKING USE OF HIGH THROUGHPUT TECHNOLOGIES AS DESCRIBED BY CMS-2020-01-R: HCPCS | Performed by: ORTHOPAEDIC SURGERY

## 2022-07-01 NOTE — TELEPHONE ENCOUNTER
I spoke with patient's daughter, Gustabo Kothari and her   I reviewed Neurology's input for her surgery  They verbalized understanding  They would like Samantha to come home after surgery and are able to provide support to her  They also plan on being present for upcoming appointments in general   They have no further questions regarding surgery

## 2022-07-02 LAB — SARS-COV-2 RNA RESP QL NAA+PROBE: NEGATIVE

## 2022-07-05 ENCOUNTER — ANESTHESIA (OUTPATIENT)
Dept: PERIOP | Facility: HOSPITAL | Age: 57
DRG: 470 | End: 2022-07-05
Payer: MEDICARE

## 2022-07-05 ENCOUNTER — HOSPITAL ENCOUNTER (INPATIENT)
Facility: HOSPITAL | Age: 57
LOS: 8 days | Discharge: HOME | DRG: 470 | End: 2022-07-13
Attending: ORTHOPAEDIC SURGERY | Admitting: ORTHOPAEDIC SURGERY
Payer: MEDICARE

## 2022-07-05 DIAGNOSIS — F41.1 GENERALIZED ANXIETY DISORDER: ICD-10-CM

## 2022-07-05 DIAGNOSIS — E78.2 MIXED HYPERLIPIDEMIA: ICD-10-CM

## 2022-07-05 DIAGNOSIS — Z11.59 SCREENING FOR VIRAL DISEASE: Primary | ICD-10-CM

## 2022-07-05 DIAGNOSIS — M51.16 LUMBAR DISC DISEASE WITH RADICULOPATHY: ICD-10-CM

## 2022-07-05 DIAGNOSIS — F31.81 BIPOLAR II DISORDER (HCC): ICD-10-CM

## 2022-07-05 DIAGNOSIS — G47.33 OSA (OBSTRUCTIVE SLEEP APNEA): ICD-10-CM

## 2022-07-05 DIAGNOSIS — R56.9 SEIZURE-LIKE ACTIVITY (HCC): ICD-10-CM

## 2022-07-05 DIAGNOSIS — F41.0 PANIC DISORDER WITHOUT AGORAPHOBIA: ICD-10-CM

## 2022-07-05 DIAGNOSIS — I10 ESSENTIAL HYPERTENSION: ICD-10-CM

## 2022-07-05 DIAGNOSIS — F09 COGNITIVE DISORDER: ICD-10-CM

## 2022-07-05 DIAGNOSIS — Z96.89 STATUS POST INSERTION OF SPINAL CORD STIMULATOR: ICD-10-CM

## 2022-07-05 DIAGNOSIS — I63.9 CEREBROVASCULAR ACCIDENT (CVA), UNSPECIFIED MECHANISM (HCC): ICD-10-CM

## 2022-07-05 DIAGNOSIS — F33.1 MAJOR DEPRESSIVE DISORDER, RECURRENT EPISODE, MODERATE DEGREE (HCC): ICD-10-CM

## 2022-07-05 DIAGNOSIS — Z96.652 STATUS POST TOTAL KNEE REPLACEMENT, LEFT: ICD-10-CM

## 2022-07-05 PROCEDURE — 0SRD069 REPLACEMENT OF LEFT KNEE JOINT WITH OXIDIZED ZIRCONIUM ON POLYETHYLENE SYNTHETIC SUBSTITUTE, CEMENTED, OPEN APPROACH: ICD-10-PCS | Performed by: ORTHOPAEDIC SURGERY

## 2022-07-05 PROCEDURE — C1776 JOINT DEVICE (IMPLANTABLE): HCPCS | Performed by: ORTHOPAEDIC SURGERY

## 2022-07-05 PROCEDURE — 27447 TOTAL KNEE ARTHROPLASTY: CPT | Performed by: ORTHOPAEDIC SURGERY

## 2022-07-05 PROCEDURE — 27447 TOTAL KNEE ARTHROPLASTY: CPT | Performed by: PHYSICIAN ASSISTANT

## 2022-07-05 PROCEDURE — C1713 ANCHOR/SCREW BN/BN,TIS/BN: HCPCS | Performed by: ORTHOPAEDIC SURGERY

## 2022-07-05 DEVICE — ATTUNE KNEE SYSTEM FEMORAL CRUCIATE RETAINING SIZE 3 LEFT CEMENTED
Type: IMPLANTABLE DEVICE | Site: KNEE | Status: FUNCTIONAL
Brand: ATTUNE

## 2022-07-05 DEVICE — SMARTSET GMV HIGH PERFORMANCE GENTAMICIN MEDIUM VISCOSITY BONE CEMENT 40G
Type: IMPLANTABLE DEVICE | Site: KNEE | Status: FUNCTIONAL
Brand: SMARTSET

## 2022-07-05 DEVICE — ATTUNE KNEE SYSTEM TIBIAL BASE ROTATING PLATFORM SIZE 4 CEMENTED
Type: IMPLANTABLE DEVICE | Site: KNEE | Status: FUNCTIONAL
Brand: ATTUNE

## 2022-07-05 DEVICE — ATTUNE KNEE SYSTEM TIBIAL INSERT ROTATING PLATFORM CRUCIATE RETAINING SIZE 3 6MM AOX
Type: IMPLANTABLE DEVICE | Site: KNEE | Status: FUNCTIONAL
Brand: ATTUNE

## 2022-07-05 DEVICE — ATTUNE PATELLA MEDIALIZED DOME 35MM CEMENTED AOX
Type: IMPLANTABLE DEVICE | Site: KNEE | Status: FUNCTIONAL
Brand: ATTUNE

## 2022-07-05 RX ORDER — POTASSIUM CHLORIDE 750 MG/1
10 TABLET, EXTENDED RELEASE ORAL DAILY
Status: DISCONTINUED | OUTPATIENT
Start: 2022-07-05 | End: 2022-07-13 | Stop reason: HOSPADM

## 2022-07-05 RX ORDER — ENOXAPARIN SODIUM 100 MG/ML
40 INJECTION SUBCUTANEOUS DAILY
Status: DISCONTINUED | OUTPATIENT
Start: 2022-07-06 | End: 2022-07-13 | Stop reason: HOSPADM

## 2022-07-05 RX ORDER — ACETAMINOPHEN 325 MG/1
975 TABLET ORAL ONCE
Status: COMPLETED | OUTPATIENT
Start: 2022-07-05 | End: 2022-07-05

## 2022-07-05 RX ORDER — PROPRANOLOL HYDROCHLORIDE 20 MG/1
10 TABLET ORAL
Status: DISCONTINUED | OUTPATIENT
Start: 2022-07-06 | End: 2022-07-13 | Stop reason: HOSPADM

## 2022-07-05 RX ORDER — FENTANYL CITRATE 50 UG/ML
INJECTION, SOLUTION INTRAMUSCULAR; INTRAVENOUS
Status: COMPLETED | OUTPATIENT
Start: 2022-07-05 | End: 2022-07-05

## 2022-07-05 RX ORDER — CALCIUM CARBONATE 200(500)MG
1000 TABLET,CHEWABLE ORAL DAILY PRN
Status: DISCONTINUED | OUTPATIENT
Start: 2022-07-05 | End: 2022-07-13 | Stop reason: HOSPADM

## 2022-07-05 RX ORDER — SODIUM CHLORIDE, SODIUM LACTATE, POTASSIUM CHLORIDE, CALCIUM CHLORIDE 600; 310; 30; 20 MG/100ML; MG/100ML; MG/100ML; MG/100ML
125 INJECTION, SOLUTION INTRAVENOUS CONTINUOUS
Status: DISCONTINUED | OUTPATIENT
Start: 2022-07-05 | End: 2022-07-05

## 2022-07-05 RX ORDER — PANTOPRAZOLE SODIUM 20 MG/1
20 TABLET, DELAYED RELEASE ORAL
Status: DISCONTINUED | OUTPATIENT
Start: 2022-07-06 | End: 2022-07-13 | Stop reason: HOSPADM

## 2022-07-05 RX ORDER — BENZTROPINE MESYLATE 1 MG/1
1 TABLET ORAL 2 TIMES DAILY
Status: DISCONTINUED | OUTPATIENT
Start: 2022-07-05 | End: 2022-07-13 | Stop reason: HOSPADM

## 2022-07-05 RX ORDER — HYDROMORPHONE HCL/PF 1 MG/ML
0.5 SYRINGE (ML) INJECTION EVERY 4 HOURS PRN
Status: DISCONTINUED | OUTPATIENT
Start: 2022-07-05 | End: 2022-07-13 | Stop reason: HOSPADM

## 2022-07-05 RX ORDER — CEFAZOLIN SODIUM 2 G/50ML
2000 SOLUTION INTRAVENOUS EVERY 8 HOURS
Status: COMPLETED | OUTPATIENT
Start: 2022-07-05 | End: 2022-07-06

## 2022-07-05 RX ORDER — POLYETHYLENE GLYCOL 3350 17 G/17G
17 POWDER, FOR SOLUTION ORAL DAILY PRN
Status: DISCONTINUED | OUTPATIENT
Start: 2022-07-05 | End: 2022-07-13 | Stop reason: HOSPADM

## 2022-07-05 RX ORDER — AMLODIPINE BESYLATE 10 MG/1
10 TABLET ORAL DAILY
Status: DISCONTINUED | OUTPATIENT
Start: 2022-07-05 | End: 2022-07-13 | Stop reason: HOSPADM

## 2022-07-05 RX ORDER — CHLORHEXIDINE GLUCONATE 4 G/100ML
SOLUTION TOPICAL DAILY PRN
Status: DISCONTINUED | OUTPATIENT
Start: 2022-07-05 | End: 2022-07-05 | Stop reason: HOSPADM

## 2022-07-05 RX ORDER — ROPIVACAINE HYDROCHLORIDE 5 MG/ML
INJECTION, SOLUTION EPIDURAL; INFILTRATION; PERINEURAL
Status: COMPLETED | OUTPATIENT
Start: 2022-07-05 | End: 2022-07-05

## 2022-07-05 RX ORDER — FENTANYL CITRATE/PF 50 MCG/ML
25 SYRINGE (ML) INJECTION
Status: DISCONTINUED | OUTPATIENT
Start: 2022-07-05 | End: 2022-07-05 | Stop reason: HOSPADM

## 2022-07-05 RX ORDER — ONDANSETRON 2 MG/ML
INJECTION INTRAMUSCULAR; INTRAVENOUS AS NEEDED
Status: DISCONTINUED | OUTPATIENT
Start: 2022-07-05 | End: 2022-07-05

## 2022-07-05 RX ORDER — SODIUM CHLORIDE, SODIUM LACTATE, POTASSIUM CHLORIDE, CALCIUM CHLORIDE 600; 310; 30; 20 MG/100ML; MG/100ML; MG/100ML; MG/100ML
100 INJECTION, SOLUTION INTRAVENOUS CONTINUOUS
Status: DISCONTINUED | OUTPATIENT
Start: 2022-07-05 | End: 2022-07-07

## 2022-07-05 RX ORDER — HYDROMORPHONE HYDROCHLORIDE 2 MG/ML
INJECTION, SOLUTION INTRAMUSCULAR; INTRAVENOUS; SUBCUTANEOUS AS NEEDED
Status: DISCONTINUED | OUTPATIENT
Start: 2022-07-05 | End: 2022-07-05

## 2022-07-05 RX ORDER — CHLORHEXIDINE GLUCONATE 0.12 MG/ML
15 RINSE ORAL ONCE
Status: COMPLETED | OUTPATIENT
Start: 2022-07-05 | End: 2022-07-05

## 2022-07-05 RX ORDER — ASCORBIC ACID 500 MG
500 TABLET ORAL DAILY
Status: DISCONTINUED | OUTPATIENT
Start: 2022-07-05 | End: 2022-07-13 | Stop reason: HOSPADM

## 2022-07-05 RX ORDER — ROCURONIUM BROMIDE 10 MG/ML
INJECTION, SOLUTION INTRAVENOUS AS NEEDED
Status: DISCONTINUED | OUTPATIENT
Start: 2022-07-05 | End: 2022-07-05

## 2022-07-05 RX ORDER — CEFAZOLIN SODIUM 2 G/50ML
2000 SOLUTION INTRAVENOUS ONCE
Status: DISCONTINUED | OUTPATIENT
Start: 2022-07-05 | End: 2022-07-05 | Stop reason: HOSPADM

## 2022-07-05 RX ORDER — HYDROXYZINE HYDROCHLORIDE 25 MG/1
50 TABLET, FILM COATED ORAL 3 TIMES DAILY PRN
Status: DISCONTINUED | OUTPATIENT
Start: 2022-07-05 | End: 2022-07-13 | Stop reason: HOSPADM

## 2022-07-05 RX ORDER — MEPERIDINE HYDROCHLORIDE 25 MG/ML
12.5 INJECTION INTRAMUSCULAR; INTRAVENOUS; SUBCUTANEOUS
Status: DISCONTINUED | OUTPATIENT
Start: 2022-07-05 | End: 2022-07-05 | Stop reason: HOSPADM

## 2022-07-05 RX ORDER — QUETIAPINE FUMARATE 25 MG/1
50 TABLET, FILM COATED ORAL
Status: DISCONTINUED | OUTPATIENT
Start: 2022-07-05 | End: 2022-07-13 | Stop reason: HOSPADM

## 2022-07-05 RX ORDER — ACETAMINOPHEN 325 MG/1
975 TABLET ORAL EVERY 8 HOURS SCHEDULED
Status: DISCONTINUED | OUTPATIENT
Start: 2022-07-05 | End: 2022-07-13 | Stop reason: HOSPADM

## 2022-07-05 RX ORDER — MAGNESIUM HYDROXIDE 1200 MG/15ML
LIQUID ORAL AS NEEDED
Status: DISCONTINUED | OUTPATIENT
Start: 2022-07-05 | End: 2022-07-05 | Stop reason: HOSPADM

## 2022-07-05 RX ORDER — LITHIUM CARBONATE 300 MG/1
300 TABLET, FILM COATED, EXTENDED RELEASE ORAL
Status: DISCONTINUED | OUTPATIENT
Start: 2022-07-05 | End: 2022-07-13 | Stop reason: HOSPADM

## 2022-07-05 RX ORDER — EPHEDRINE SULFATE 50 MG/ML
INJECTION INTRAVENOUS AS NEEDED
Status: DISCONTINUED | OUTPATIENT
Start: 2022-07-05 | End: 2022-07-05

## 2022-07-05 RX ORDER — NEOSTIGMINE METHYLSULFATE 1 MG/ML
INJECTION INTRAVENOUS AS NEEDED
Status: DISCONTINUED | OUTPATIENT
Start: 2022-07-05 | End: 2022-07-05

## 2022-07-05 RX ORDER — VALBENAZINE 80 MG/1
CAPSULE ORAL
COMMUNITY

## 2022-07-05 RX ORDER — PROPOFOL 10 MG/ML
INJECTION, EMULSION INTRAVENOUS AS NEEDED
Status: DISCONTINUED | OUTPATIENT
Start: 2022-07-05 | End: 2022-07-05

## 2022-07-05 RX ORDER — GLYCOPYRROLATE 0.2 MG/ML
INJECTION INTRAMUSCULAR; INTRAVENOUS AS NEEDED
Status: DISCONTINUED | OUTPATIENT
Start: 2022-07-05 | End: 2022-07-05

## 2022-07-05 RX ORDER — ATORVASTATIN CALCIUM 40 MG/1
40 TABLET, FILM COATED ORAL
Status: DISCONTINUED | OUTPATIENT
Start: 2022-07-05 | End: 2022-07-13 | Stop reason: HOSPADM

## 2022-07-05 RX ORDER — SODIUM CHLORIDE 9 MG/ML
75 INJECTION, SOLUTION INTRAVENOUS CONTINUOUS
Status: DISCONTINUED | OUTPATIENT
Start: 2022-07-05 | End: 2022-07-05

## 2022-07-05 RX ORDER — SENNOSIDES 8.6 MG
1 TABLET ORAL
Status: DISCONTINUED | OUTPATIENT
Start: 2022-07-05 | End: 2022-07-13 | Stop reason: HOSPADM

## 2022-07-05 RX ORDER — ONDANSETRON 2 MG/ML
4 INJECTION INTRAMUSCULAR; INTRAVENOUS ONCE AS NEEDED
Status: DISCONTINUED | OUTPATIENT
Start: 2022-07-05 | End: 2022-07-05 | Stop reason: HOSPADM

## 2022-07-05 RX ORDER — MIDAZOLAM HYDROCHLORIDE 2 MG/2ML
INJECTION, SOLUTION INTRAMUSCULAR; INTRAVENOUS
Status: COMPLETED | OUTPATIENT
Start: 2022-07-05 | End: 2022-07-05

## 2022-07-05 RX ORDER — LORAZEPAM 0.5 MG/1
0.5 TABLET ORAL 2 TIMES DAILY PRN
Status: DISCONTINUED | OUTPATIENT
Start: 2022-07-05 | End: 2022-07-13 | Stop reason: HOSPADM

## 2022-07-05 RX ORDER — PAROXETINE HYDROCHLORIDE 20 MG/1
60 TABLET, FILM COATED ORAL DAILY
Status: DISCONTINUED | OUTPATIENT
Start: 2022-07-05 | End: 2022-07-13 | Stop reason: HOSPADM

## 2022-07-05 RX ORDER — BUSPIRONE HYDROCHLORIDE 10 MG/1
20 TABLET ORAL 3 TIMES DAILY
Status: DISCONTINUED | OUTPATIENT
Start: 2022-07-05 | End: 2022-07-13 | Stop reason: HOSPADM

## 2022-07-05 RX ORDER — CEFAZOLIN SODIUM 2 G/50ML
SOLUTION INTRAVENOUS AS NEEDED
Status: DISCONTINUED | OUTPATIENT
Start: 2022-07-05 | End: 2022-07-05

## 2022-07-05 RX ORDER — DEXAMETHASONE SODIUM PHOSPHATE 10 MG/ML
INJECTION, SOLUTION INTRAMUSCULAR; INTRAVENOUS AS NEEDED
Status: DISCONTINUED | OUTPATIENT
Start: 2022-07-05 | End: 2022-07-05

## 2022-07-05 RX ORDER — FOLIC ACID 1 MG/1
1000 TABLET ORAL DAILY
Status: DISCONTINUED | OUTPATIENT
Start: 2022-07-05 | End: 2022-07-13 | Stop reason: HOSPADM

## 2022-07-05 RX ORDER — OXYCODONE HYDROCHLORIDE 5 MG/1
5 TABLET ORAL EVERY 4 HOURS PRN
Status: DISCONTINUED | OUTPATIENT
Start: 2022-07-05 | End: 2022-07-13 | Stop reason: HOSPADM

## 2022-07-05 RX ORDER — DOCUSATE SODIUM 100 MG/1
100 CAPSULE, LIQUID FILLED ORAL 2 TIMES DAILY
Status: DISCONTINUED | OUTPATIENT
Start: 2022-07-05 | End: 2022-07-13 | Stop reason: HOSPADM

## 2022-07-05 RX ORDER — OXYCODONE HYDROCHLORIDE 10 MG/1
10 TABLET ORAL EVERY 4 HOURS PRN
Status: DISCONTINUED | OUTPATIENT
Start: 2022-07-05 | End: 2022-07-13 | Stop reason: HOSPADM

## 2022-07-05 RX ORDER — HYDROMORPHONE HCL/PF 1 MG/ML
0.5 SYRINGE (ML) INJECTION
Status: DISCONTINUED | OUTPATIENT
Start: 2022-07-05 | End: 2022-07-05 | Stop reason: HOSPADM

## 2022-07-05 RX ORDER — LIDOCAINE HYDROCHLORIDE 20 MG/ML
INJECTION, SOLUTION EPIDURAL; INFILTRATION; INTRACAUDAL; PERINEURAL AS NEEDED
Status: DISCONTINUED | OUTPATIENT
Start: 2022-07-05 | End: 2022-07-05

## 2022-07-05 RX ADMIN — LITHIUM CARBONATE 300 MG: 300 TABLET, FILM COATED, EXTENDED RELEASE ORAL at 22:15

## 2022-07-05 RX ADMIN — ATORVASTATIN CALCIUM 40 MG: 40 TABLET, FILM COATED ORAL at 15:37

## 2022-07-05 RX ADMIN — FENTANYL CITRATE 25 MCG: 50 INJECTION, SOLUTION INTRAMUSCULAR; INTRAVENOUS at 14:51

## 2022-07-05 RX ADMIN — SODIUM CHLORIDE, SODIUM LACTATE, POTASSIUM CHLORIDE, AND CALCIUM CHLORIDE 100 ML/HR: .6; .31; .03; .02 INJECTION, SOLUTION INTRAVENOUS at 22:18

## 2022-07-05 RX ADMIN — DEXAMETHASONE SODIUM PHOSPHATE 10 MG: 10 INJECTION INTRAMUSCULAR; INTRAVENOUS at 11:02

## 2022-07-05 RX ADMIN — FENTANYL CITRATE 100 MCG: 50 INJECTION INTRAMUSCULAR; INTRAVENOUS at 11:00

## 2022-07-05 RX ADMIN — PAROXETINE 60 MG: 20 TABLET, FILM COATED ORAL at 15:37

## 2022-07-05 RX ADMIN — DOCUSATE SODIUM 100 MG: 100 CAPSULE, LIQUID FILLED ORAL at 17:30

## 2022-07-05 RX ADMIN — QUETIAPINE FUMARATE 50 MG: 25 TABLET ORAL at 21:37

## 2022-07-05 RX ADMIN — SODIUM CHLORIDE, SODIUM LACTATE, POTASSIUM CHLORIDE, AND CALCIUM CHLORIDE: .6; .31; .03; .02 INJECTION, SOLUTION INTRAVENOUS at 11:49

## 2022-07-05 RX ADMIN — ACETAMINOPHEN 325MG 975 MG: 325 TABLET ORAL at 21:37

## 2022-07-05 RX ADMIN — OXYCODONE HYDROCHLORIDE AND ACETAMINOPHEN 500 MG: 500 TABLET ORAL at 15:37

## 2022-07-05 RX ADMIN — EPHEDRINE SULFATE 5 MG: 50 INJECTION, SOLUTION INTRAVENOUS at 12:34

## 2022-07-05 RX ADMIN — GLYCOPYRROLATE 0.4 MG: 0.2 INJECTION, SOLUTION INTRAMUSCULAR; INTRAVENOUS at 13:55

## 2022-07-05 RX ADMIN — ROPIVACAINE HYDROCHLORIDE 30 ML: 5 INJECTION, SOLUTION EPIDURAL; INFILTRATION; PERINEURAL at 09:45

## 2022-07-05 RX ADMIN — ROCURONIUM BROMIDE 50 MG: 50 INJECTION, SOLUTION INTRAVENOUS at 10:46

## 2022-07-05 RX ADMIN — SENNOSIDES 8.6 MG: 8.6 TABLET ORAL at 21:37

## 2022-07-05 RX ADMIN — CEFAZOLIN SODIUM 2000 MG: 2 SOLUTION INTRAVENOUS at 10:41

## 2022-07-05 RX ADMIN — BUSPIRONE HYDROCHLORIDE 20 MG: 10 TABLET ORAL at 15:37

## 2022-07-05 RX ADMIN — LIDOCAINE HYDROCHLORIDE 100 MG: 20 INJECTION, SOLUTION EPIDURAL; INFILTRATION; INTRACAUDAL; PERINEURAL at 10:44

## 2022-07-05 RX ADMIN — OXYCODONE HYDROCHLORIDE 5 MG: 5 TABLET ORAL at 15:44

## 2022-07-05 RX ADMIN — IRON SUCROSE 300 MG: 20 INJECTION, SOLUTION INTRAVENOUS at 15:45

## 2022-07-05 RX ADMIN — PROPOFOL 200 MG: 10 INJECTION, EMULSION INTRAVENOUS at 10:44

## 2022-07-05 RX ADMIN — ACETAMINOPHEN 975 MG: 325 TABLET, FILM COATED ORAL at 08:58

## 2022-07-05 RX ADMIN — POTASSIUM CHLORIDE 10 MEQ: 750 TABLET, EXTENDED RELEASE ORAL at 15:37

## 2022-07-05 RX ADMIN — CHLORHEXIDINE GLUCONATE 0.12% ORAL RINSE 15 ML: 1.2 LIQUID ORAL at 08:58

## 2022-07-05 RX ADMIN — SODIUM CHLORIDE, SODIUM LACTATE, POTASSIUM CHLORIDE, AND CALCIUM CHLORIDE 100 ML/HR: .6; .31; .03; .02 INJECTION, SOLUTION INTRAVENOUS at 15:38

## 2022-07-05 RX ADMIN — NEOSTIGMINE METHYLSULFATE 3 MG: 1 INJECTION INTRAVENOUS at 13:55

## 2022-07-05 RX ADMIN — CEFAZOLIN SODIUM 2000 MG: 2 SOLUTION INTRAVENOUS at 17:52

## 2022-07-05 RX ADMIN — VALBENAZINE 80 MG: 80 CAPSULE ORAL at 17:30

## 2022-07-05 RX ADMIN — FOLIC ACID 1000 MCG: 1 TABLET ORAL at 15:37

## 2022-07-05 RX ADMIN — BUSPIRONE HYDROCHLORIDE 20 MG: 10 TABLET ORAL at 21:37

## 2022-07-05 RX ADMIN — SODIUM CHLORIDE, SODIUM LACTATE, POTASSIUM CHLORIDE, AND CALCIUM CHLORIDE 125 ML/HR: .6; .31; .03; .02 INJECTION, SOLUTION INTRAVENOUS at 09:28

## 2022-07-05 RX ADMIN — BENZTROPINE MESYLATE 1 MG: 1 TABLET ORAL at 17:30

## 2022-07-05 RX ADMIN — MIDAZOLAM 4 MG: 1 INJECTION INTRAMUSCULAR; INTRAVENOUS at 09:45

## 2022-07-05 RX ADMIN — ONDANSETRON 4 MG: 2 INJECTION INTRAMUSCULAR; INTRAVENOUS at 13:36

## 2022-07-05 RX ADMIN — HYDROMORPHONE HYDROCHLORIDE 2 MG: 2 INJECTION, SOLUTION INTRAMUSCULAR; INTRAVENOUS; SUBCUTANEOUS at 11:17

## 2022-07-05 RX ADMIN — FENTANYL CITRATE 100 MCG: 50 INJECTION INTRAMUSCULAR; INTRAVENOUS at 09:45

## 2022-07-05 NOTE — INTERVAL H&P NOTE
H&P reviewed  After examining the patient I find no changes in the patients condition since the H&P had been written      Vitals:    07/05/22 0957   BP: 150/89   Pulse: 68   Resp: 16   Temp:    SpO2: 100%

## 2022-07-05 NOTE — ANESTHESIA POSTPROCEDURE EVALUATION
Post-Op Assessment Note    CV Status:  Stable    Pain management: adequate     Mental Status:  Alert and awake   Hydration Status:  Euvolemic   PONV Controlled:  Controlled   Airway Patency:  Patent and fixed obstruction      Post Op Vitals Reviewed: Yes      Staff: Anesthesiologist         No complications documented      BP      Temp      Pulse     Resp      SpO2      /89   Pulse 72   Temp 97 5 °F (36 4 °C)   Resp 20   Ht 5' (1 524 m)   Wt 80 7 kg (177 lb 14 6 oz)   SpO2 97%   Breastfeeding No   BMI 34 75 kg/m²

## 2022-07-05 NOTE — PHYSICAL THERAPY NOTE
PHYSICAL THERAPY NOTE          Patient Name: Chloe Gotti  NNFGL'R Date: 7/5/2022      PT consult received  Chart reviewed  Per RN, pt still has LE numbness hence will defer PT eval at this time  Will follow in a johnny Contreras, PT

## 2022-07-05 NOTE — ANESTHESIA PROCEDURE NOTES
Peripheral Block    Patient location during procedure: holding area  Reason for block: at surgeon's request and post-op pain management  Staffing  Performed: Anesthesiologist   Anesthesiologist: Rosario Floor, DO  Preanesthetic Checklist  Completed: patient identified, IV checked, site marked, risks and benefits discussed, surgical consent, monitors and equipment checked, pre-op evaluation and timeout performed  Peripheral Block  Patient position: supine  Prep: ChloraPrep  Patient monitoring: continuous pulse ox, frequent blood pressure checks, cardiac monitor and heart rate  Block type: adductor canal block  Laterality: left  Injection technique: single-shot  Procedures: ultrasound guided, Ultrasound guidance required for the procedure to increase accuracy and safety of medication placement and decrease risk of complications    Ultrasound permanent image savedropivacaine (NAROPIN) 0 5 % - Perineural   30 mL - 7/5/2022 9:45:00 AM  midazolam (VERSED) 2 mg/2 mL - Intravenous   4 mg - 7/5/2022 9:45:00 AM  fentaNYL 50 mcg/mL - Intravenous   100 mcg - 7/5/2022 9:45:00 AM  Needle  Needle type: Stimuplex   Needle gauge: 21 G  Needle length: 10 cm  Needle localization: ultrasound guidance  Test dose: negative  Assessment  Injection assessment: incremental injection and local visualized surrounding nerve on ultrasound  Paresthesia pain: none  Heart rate change: no  Slow fractionated injection: yes  Post-procedure:  site cleaned  patient tolerated the procedure well with no immediate complications  Additional Notes  Block done with PRITI Tran

## 2022-07-05 NOTE — OP NOTE
OPERATIVE REPORT  PATIENT NAME: Shanda Esteban    :  1965  MRN: [id number]  Pt Location: [facility]OM 01    SURGERY DATE: 2022    Surgeon(s) and Role:     * Heidi Berkowitz DO - Primary     * Beth Alcocer PA-C - Assisting    Preop Diagnosis:  Primary osteoarthritis of left knee [M17 12]    Post-Op Diagnosis Codes:     * Primary osteoarthritis of left knee [M17 12]    Procedure(s) (LRB):  ARTHROPLASTY KNEE TOTAL (Left)    Specimen(s):  * No specimens in log *    Estimated Blood Loss:   Minimal    Drains:  Urethral Catheter Latex 16 Fr  (Active)   Number of days: 0       Anesthesia Type:   Spinal    Operative Indications:  Primary osteoarthritis of left knee [M17 12]      Operative Findings:  See below    Complications:   None    Procedure and Technique:  Implants:  Depuy Attune  CR femoral component size 3  Tibial base rotating platform size 4  Tibial insert rotating platform CR size 3, 6mm  Dome patella size 35mm    INDICATIONS FOR PROCEDURE:  The patients is a 60 y o  female who presented to the office with  knee OA  Conservative treatment was attempted for quite some time and ultimately failed  She has severe progressive pain, stiffness, and disability and now elects to proceed with left total knee replacement surgery  Extensive counseling in regards to the reasons for surgical intervention as well as the risks and benefits of surgery were reviewed  The risks include, but are not limited to infection, extensive blood loss, blood clots, wound healing problems, fracture, need for additional surgery, need for revision surgery, failure of hardware, persistent pain and stiffness, heart attack, stroke, death  The patient understood and agreed to by oral and written consent      OPERATIVE PROCEDURE:  The patient was identified as Samantha Maloney by Her ID bracelet by the surgical staff in the preoperative area at [address]   The patient was wheeled back to the surgical room and placed on the operative table  Anesthesia was administered  Preoperative antibiotics were given  The patient remained in the supine position and all bony prominences were carefully protected  A tourniquet was applied to the patients left upper thigh  The left leg was then prepped and draped in the usual sterile fashion  A timeout was performed where the patients name and surgical site were once again identified  The incision was marked out  The leg was elevated and the tourniquet was inflated to 300 mmHg  An incision was made over anterior aspect of the knee  Dissection was made through the skin and subcutaneous tissue  A medial parapatellar arthrotomy was made and the medial tissues were elevated while protecting the MCL  Remnants of the ACL, medial and lateral menisci were removed and retractors were placed  Severe osteoarthritis was noted  The femoral canal was opened with a drill and the contents were suctioned and the canal was irrigated  We used an intramedullary guide on the femoral side and resected 9mm of distal femur in 5 degrees of valgus  Osteophytes were removed  We then subluxated the tibia forward and opened the tibial canal with a drill  The contents of the canal were then suctioned and the canal was irrigated  We placed an intramedullary guide and resected 6mm of bone based on the highest point of the medial tibial plateau perpendicular to the mechanical axis of the tibia  Next, the flexion and extension gaps were analyzed with a spacer block and visualization  [place]s line and the epicondylar axis were then identified  The 4 in 1 cutting block was placed in 3 degrees of external rotation taking into account the wear on the posterolateral condyle  Resections were made  Posterior osteophytes and any remnants of the medial and lateral menisci were removed    Trials were then placed and the knee was felt to be stable and well balanced throughout the arc of motion  The patella was measured and 10mm of the articular portion was removed  The trial for the patella was placed and patellar tracking was checked and found to be adequate with the other components in place  Attention was directed back to the tibia  External rotation of the tibial guide was set and the final preparations of the tibia were performed  The components were removed and the knee was copiously irrigated with pulse lavage and then dried  The components were then cemented in place and excess cement was removed  Once the cement was dried the trial insert was trialed  A size 3, 5mm tibial insert was confirmed to be the correct size  The final polyethylene component was placed and the tourniquet was let down  Any bleeders were cauterized and then the knee was irrigated  The PE was noted to spin out  We went back and trialed several sizes  We ended up doing a lateral release and placing a size 3, 6mm  We took the knee through a range of motion and not spin out was detected  An irrisept wash was performed and then the knee was once again copiously irrigated  Marcaine, morphine, and toradol was injected periarticularly throughout the case  The arthrotomy was closed with vicryl suture  The skin and subcutaneous tissues were closed with vicryl and then a running monocryl stitch  A sterile dressing was placed  The patient was awakened and transferred to a hospital bed and then to the recovery room in stable condition  I attest that I was present and performed this procedure  Lisa lAvarez PA-C and Ingrid Velasco ATC were present for the entire procedure and provided essential assistance with limb position, patient prepping, and retraction    A qualified resident physician was not available    Patient Disposition:  hemodynamically stable      SIGNATURE: Jude Neri DO  DATE: July 5, 2022  TIME: 2:19 PM

## 2022-07-05 NOTE — ANESTHESIA PREPROCEDURE EVALUATION
Procedure:  ARTHROPLASTY KNEE TOTAL (Left Knee)    Relevant Problems   CARDIO   (+) Essential hypertension   (+) Migraine   (+) Mixed hyperlipidemia      GI/HEPATIC   (+) Dysphagia   (+) Esophageal reflux      MUSCULOSKELETAL   (+) Arthritis of right knee   (+) Chronic back pain   (+) Fibromyalgia   (+) Intractable low back pain   (+) Lumbar spondylosis   (+) Osteoarthritis of both hips   (+) Osteoarthritis of knee   (+) Primary localized osteoarthritis of both knees   (+) Primary osteoarthritis of both knees   (+) Sacroiliitis (HCC)      NEURO/PSYCH   (+) CVA (cerebral vascular accident) ([address] )   (+) Chronic back pain   (+) Chronic pain disorder   (+) Fibromyalgia   (+) Generalized anxiety disorder   (+) History of CVA (cerebrovascular accident)   (+) History of hypokalemia   (+) Intractable low back pain   (+) Major depressive disorder, recurrent episode, moderate degree (HCC)   (+) Migraine   (+) Panic disorder without agoraphobia   (+) Post traumatic stress disorder      PULMONARY   (+) MIMI (obstructive sleep apnea)        Physical Exam    Airway    Mallampati score: II  TM Distance: >3 FB  Neck ROM: full     Dental   lower dentures and upper dentures,     Cardiovascular  Rhythm: regular, Rate: normal, Cardiovascular exam normal    Pulmonary  Pulmonary exam normal Breath sounds clear to auscultation,     Other Findings        Anesthesia Plan  ASA Score- 3     Anesthesia Type- general with ASA Monitors  Additional Monitors:   Airway Plan: ETT  Comment: Adductor canal block , after sedation, preop discussed  S/P R TKR 3/2022  Plan Factors-    Chart reviewed  Existing labs reviewed  Patient summary reviewed  Patient is not a current smoker  Patient instructed to abstain from smoking on day of procedure  Patient did not smoke on day of surgery  Obstructive sleep apnea risk education given perioperatively  Induction- intravenous  Postoperative Plan- Plan for postoperative opioid use  Planned trial extubation    Informed Consent- Anesthetic plan and risks discussed with patient and son (S-I-L)

## 2022-07-06 ENCOUNTER — PATIENT OUTREACH (OUTPATIENT)
Dept: INTERNAL MEDICINE CLINIC | Facility: CLINIC | Age: 57
End: 2022-07-06

## 2022-07-06 ENCOUNTER — TELEPHONE (OUTPATIENT)
Dept: INTERNAL MEDICINE CLINIC | Facility: CLINIC | Age: 57
End: 2022-07-06

## 2022-07-06 PROBLEM — D64.9 ANEMIA: Status: ACTIVE | Noted: 2022-07-06

## 2022-07-06 LAB
ANION GAP SERPL CALCULATED.3IONS-SCNC: 9 MMOL/L (ref 4–13)
BUN SERPL-MCNC: 16 MG/DL (ref 5–25)
CALCIUM SERPL-MCNC: 8.1 MG/DL (ref 8.3–10.1)
CHLORIDE SERPL-SCNC: 104 MMOL/L (ref 100–108)
CO2 SERPL-SCNC: 27 MMOL/L (ref 21–32)
CREAT SERPL-MCNC: 0.9 MG/DL (ref 0.6–1.3)
ERYTHROCYTE [DISTWIDTH] IN BLOOD BY AUTOMATED COUNT: 14.2 % (ref 11.6–15.1)
GFR SERPL CREATININE-BSD FRML MDRD: 70 ML/MIN/1.73SQ M
GLUCOSE SERPL-MCNC: 111 MG/DL (ref 65–140)
HCT VFR BLD AUTO: 32 % (ref 34.8–46.1)
HGB BLD-MCNC: 10.2 G/DL (ref 11.5–15.4)
MCH RBC QN AUTO: 28.7 PG (ref 26.8–34.3)
MCHC RBC AUTO-ENTMCNC: 31.9 G/DL (ref 31.4–37.4)
MCV RBC AUTO: 90 FL (ref 82–98)
PLATELET # BLD AUTO: 314 THOUSANDS/UL (ref 149–390)
PMV BLD AUTO: 9 FL (ref 8.9–12.7)
POTASSIUM SERPL-SCNC: 3.5 MMOL/L (ref 3.5–5.3)
RBC # BLD AUTO: 3.55 MILLION/UL (ref 3.81–5.12)
SODIUM SERPL-SCNC: 140 MMOL/L (ref 136–145)
WBC # BLD AUTO: 9.45 THOUSAND/UL (ref 4.31–10.16)

## 2022-07-06 PROCEDURE — 80048 BASIC METABOLIC PNL TOTAL CA: CPT | Performed by: PHYSICIAN ASSISTANT

## 2022-07-06 PROCEDURE — 85027 COMPLETE CBC AUTOMATED: CPT | Performed by: PHYSICIAN ASSISTANT

## 2022-07-06 PROCEDURE — 97167 OT EVAL HIGH COMPLEX 60 MIN: CPT

## 2022-07-06 PROCEDURE — 97110 THERAPEUTIC EXERCISES: CPT

## 2022-07-06 PROCEDURE — 99024 POSTOP FOLLOW-UP VISIT: CPT | Performed by: ORTHOPAEDIC SURGERY

## 2022-07-06 PROCEDURE — 97163 PT EVAL HIGH COMPLEX 45 MIN: CPT

## 2022-07-06 PROCEDURE — 97530 THERAPEUTIC ACTIVITIES: CPT

## 2022-07-06 PROCEDURE — 99253 IP/OBS CNSLTJ NEW/EST LOW 45: CPT | Performed by: PHYSICIAN ASSISTANT

## 2022-07-06 RX ADMIN — ATORVASTATIN CALCIUM 40 MG: 40 TABLET, FILM COATED ORAL at 15:51

## 2022-07-06 RX ADMIN — OXYCODONE HYDROCHLORIDE 10 MG: 10 TABLET ORAL at 15:51

## 2022-07-06 RX ADMIN — IRON SUCROSE 300 MG: 20 INJECTION, SOLUTION INTRAVENOUS at 15:51

## 2022-07-06 RX ADMIN — FOLIC ACID 1000 MCG: 1 TABLET ORAL at 10:38

## 2022-07-06 RX ADMIN — DOCUSATE SODIUM 100 MG: 100 CAPSULE, LIQUID FILLED ORAL at 10:39

## 2022-07-06 RX ADMIN — ACETAMINOPHEN 325MG 975 MG: 325 TABLET ORAL at 06:00

## 2022-07-06 RX ADMIN — LORAZEPAM 0.5 MG: 0.5 TABLET ORAL at 10:41

## 2022-07-06 RX ADMIN — OXYCODONE HYDROCHLORIDE 10 MG: 10 TABLET ORAL at 07:37

## 2022-07-06 RX ADMIN — SODIUM CHLORIDE, SODIUM LACTATE, POTASSIUM CHLORIDE, AND CALCIUM CHLORIDE 1000 ML: .6; .31; .03; .02 INJECTION, SOLUTION INTRAVENOUS at 06:13

## 2022-07-06 RX ADMIN — CEFAZOLIN SODIUM 2000 MG: 2 SOLUTION INTRAVENOUS at 02:34

## 2022-07-06 RX ADMIN — BENZTROPINE MESYLATE 1 MG: 1 TABLET ORAL at 18:18

## 2022-07-06 RX ADMIN — HYDROMORPHONE HYDROCHLORIDE 0.5 MG: 1 INJECTION, SOLUTION INTRAMUSCULAR; INTRAVENOUS; SUBCUTANEOUS at 05:59

## 2022-07-06 RX ADMIN — LITHIUM CARBONATE 300 MG: 300 TABLET, FILM COATED, EXTENDED RELEASE ORAL at 22:51

## 2022-07-06 RX ADMIN — OXYCODONE HYDROCHLORIDE 5 MG: 5 TABLET ORAL at 02:34

## 2022-07-06 RX ADMIN — BUSPIRONE HYDROCHLORIDE 20 MG: 10 TABLET ORAL at 10:39

## 2022-07-06 RX ADMIN — POTASSIUM CHLORIDE 10 MEQ: 750 TABLET, EXTENDED RELEASE ORAL at 10:40

## 2022-07-06 RX ADMIN — PANTOPRAZOLE SODIUM 20 MG: 20 TABLET, DELAYED RELEASE ORAL at 06:00

## 2022-07-06 RX ADMIN — OXYCODONE HYDROCHLORIDE AND ACETAMINOPHEN 500 MG: 500 TABLET ORAL at 10:39

## 2022-07-06 RX ADMIN — DOCUSATE SODIUM 100 MG: 100 CAPSULE, LIQUID FILLED ORAL at 18:19

## 2022-07-06 RX ADMIN — ENOXAPARIN SODIUM 40 MG: 40 INJECTION SUBCUTANEOUS at 02:34

## 2022-07-06 RX ADMIN — BENZTROPINE MESYLATE 1 MG: 1 TABLET ORAL at 10:39

## 2022-07-06 RX ADMIN — VALBENAZINE 80 MG: 80 CAPSULE ORAL at 10:40

## 2022-07-06 RX ADMIN — OXYCODONE HYDROCHLORIDE 10 MG: 10 TABLET ORAL at 22:05

## 2022-07-06 RX ADMIN — ACETAMINOPHEN 325MG 975 MG: 325 TABLET ORAL at 22:05

## 2022-07-06 RX ADMIN — QUETIAPINE FUMARATE 50 MG: 25 TABLET ORAL at 22:05

## 2022-07-06 RX ADMIN — BUSPIRONE HYDROCHLORIDE 20 MG: 10 TABLET ORAL at 22:05

## 2022-07-06 RX ADMIN — HYDROMORPHONE HYDROCHLORIDE 0.5 MG: 1 INJECTION, SOLUTION INTRAMUSCULAR; INTRAVENOUS; SUBCUTANEOUS at 18:19

## 2022-07-06 RX ADMIN — ACETAMINOPHEN 325MG 975 MG: 325 TABLET ORAL at 14:02

## 2022-07-06 RX ADMIN — BUSPIRONE HYDROCHLORIDE 20 MG: 10 TABLET ORAL at 15:51

## 2022-07-06 RX ADMIN — PAROXETINE 60 MG: 20 TABLET, FILM COATED ORAL at 10:39

## 2022-07-06 RX ADMIN — SENNOSIDES 8.6 MG: 8.6 TABLET ORAL at 22:05
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
## 2022-07-06 NOTE — ASSESSMENT & PLAN NOTE
With baseline hemoglobin fluctuating  · Hemoglobin today 10 2, hemoglobin 1 week ago 14 2  · Suspect multifactorial in the setting of recent surgical intervention, aggressive IV hydration  · No acute signs of bleeding at this time  · Continue Venofer, folic acid  · CBC in a m

## 2022-07-06 NOTE — PLAN OF CARE
Problem: PHYSICAL THERAPY ADULT  Goal: Performs mobility at highest level of function for planned discharge setting  See evaluation for individualized goals  Description: Treatment/Interventions: Functional transfer training, LE strengthening/ROM, Elevations, Therapeutic exercise, Endurance training, Patient/family training, Bed mobility, Gait training, Spoke to nursing, OT          See flowsheet documentation for full assessment, interventions and recommendations  7/6/2022 1642 by Bryant Guajardo PT  Note: Prognosis: Fair  Problem List: Decreased strength, Decreased range of motion, Decreased endurance, Impaired balance, Decreased mobility, Decreased cognition, Impaired judgement, Decreased safety awareness  Assessment: Pt seen for PT per POC  Pt still in severe post-op pain hence limiting overall mobility  Pt require modAx1 for bed mobility, balance, & amb w/ RW + max cues for techniques & safety  Gait still slow, antalgic & unsteady but no gross LOB noted  Dec amb tolerance 2* to severe L knee pain  Please see flowsheet above for objective findings & levels of assistance required for all functional tasks during this tx session  Pt tolerated above mentioned thera  ex fairly + rest period in between exercises 2* to pain  No SOB & dizziness reported t/o session  Nsg staff most recent vital signs as follows: /69 (BP Location: Right arm)   Pulse 88   Temp 98 9 °F (37 2 °C) (Temporal)   Resp 18   Ht 5' (1 524 m)   Wt 80 7 kg (177 lb 14 6 oz)   SpO2 96%   Breastfeeding No   BMI 34 75 kg/m²   Will continue PT per POC  At end of session, pt back in bed in stable condition, call bell & phone in reach, bed alarm activated  Fall precautions reinforced w/ good understanding  The patient's AM-PAC Basic Mobility Inpatient Short Form Raw Score is 11  A Raw score of less than or equal to 16 suggests the patient may benefit from discharge to post-acute rehabilitation services   Please also refer to the recommendation of the Physical Therapist for safe discharge planning  From PT standpoint, will continue to recommend inpt rehab at D/C  CM to follow  Nsg staff to continue to mobilized pt (OOB in chair for all meals & ambulate in room/unit) as tolerated to prevent decline in function  Nsg notified  Barriers to Discharge: Inaccessible home environment  Barriers to Discharge Comments: stairs     PT Discharge Recommendation: Post acute rehabilitation services          See flowsheet documentation for full assessment
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
## 2022-07-06 NOTE — OCCUPATIONAL THERAPY NOTE
Occupational Therapy Evaluation(time=4226-8863)     Patient Name: Jodie Lisa  PRMME'A Date: 2022  Problem List  Principal Problem:    Status post total knee replacement, left  Active Problems:    Bipolar II disorder (HCC)    Essential hypertension    Tardive dyskinesia    Mixed hyperlipidemia    Anemia    Past Medical History  Past Medical History:   Diagnosis Date    Anxiety     Arthritis     left knee    At risk for falls     Bipolar 2 disorder (HCC)     FOLLOWS WITH PSYCHIATRIST  CONTINUE LAMOTRIGINE; RESOLVED: [id number]    Depression     Familial tremor     both hands    Fibromyalgia     LAST ASSESSED: [id number]    GERD (gastroesophageal reflux disease)     Hearing aid worn     left ear    [place] (hard of hearing)     left ear    Hyperlipidemia     Hypertension     Left-sided weakness     Memory loss of unknown cause     long and short term    Migraine     Obesity     Obesity, Class II, BMI 35-39 9     Overactive bladder     Panic attack     Post traumatic stress disorder     S/P insertion of spinal cord stimulator     no remote    Seasonal allergies     Seizures ([address] )     possible seizure like activity    Stroke [facility])     questionable stroke 2009    Tardive dyskinesia     PATIENT STATES    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    CONT ASA 81 MG DAILY; RESOLVED: [id number]    Urinary incontinence     Uses walker     Wears dentures     partial lower / full upper- doesnt wear    Wears glasses      Past Surgical History  Past Surgical History:   Procedure Laterality Date    BACK SURGERY       SECTION      COLONOSCOPY      RESOLVED: [id number]    EAR SURGERY      EGD      HYSTERECTOMY      MYRINGOTOMY W/ TUBES Left     NECK SURGERY  2019    CT CYSTOURETHROSCOPY N/A 2016    Procedure: CYSTOSCOPY, BOTOX INJECTION;  Surgeon: Alma Sommers MD;  Location[facility];  Service: Gynecology    CT IMPLANT SPINAL NEUROSTIM/ Right 2/10/2021    Procedure: REPLACEMENT IMPLANTABLE PULSE GENERATOR DORSAL SPINAL COLUMN STIMULATOR, RIGHT;  Surgeon: Peng Mcclendon MD;  [facility];  Service: Neurosurgery    WY PERCUT IMPLNT Addi Estrada Right 7/28/2020    Procedure: INSERTION THORACIC DORSAL COLUMN SPINAL CORD STIMULATOR PERCUTANEOUS W IMPLANTABLE PULSE GENERATOR, RIGHT;  Surgeon: Peng Mcclendon MD;  [facility];  Service: Neurosurgery    WY TOTAL KNEE ARTHROPLASTY Right 3/9/2022    Procedure: ARTHROPLASTY KNEE TOTAL;  Surgeon: Ivan Hodges DO;  [facility];  Service: Orthopedics    WY TOTAL KNEE ARTHROPLASTY Left 7/5/2022    Procedure: ARTHROPLASTY KNEE TOTAL;  Surgeon: Ivan Hodges DO;  Location[facility];  Service: [address]    UPPER GASTROINTESTINAL ENDOSCOPY  09/2020 07/06/22 0950   Note Type   Note type Evaluation   Restrictions/Precautions   Weight Bearing Precautions Per Order Yes   LLE Weight Bearing Per Order WBAT   Other Precautions Fall Risk;Pain;Cognitive; Chair Alarm; Bed Alarm   Pain Assessment   Pain Assessment Tool 0-10   Pain Score 9   Pain Location/Orientation Orientation: Left; Location: Knee   Home Living   Type of [address] [place] [address]  (12 zoe with railing)   Bathroom Shower/Tub Tub/shower unit   [address] Walker; Wheelchair-manual   Prior Function   Lives With Family;Daughter  (CLARENCE(currently pt's caregiver), 4 grandchildren)   ADL Assistance Needs assistance   Falls in the last 6 months 5 to 10  (6)   Lifestyle   Autonomy PTA pt states that she had assistance with her ADLs, transfers, ambulation--with RW; +falls=6, neg    Reciprocal Relationships supportive dtr   Service to Others homemaker   Intrinsic Gratification watching TV   Psychosocial   Psychosocial (WDL) X   Patient Behaviors/Mood Anxious   Subjective   Subjective "I have to call my daughter "   ADL   Where Assessed Chair   Eating Assistance 6  Modified independent   Grooming Assistance 6  Modified Independent   UB Bathing Assistance 5  Supervision/Setup   LB Bathing Assistance 3  Moderate Assistance   UB Dressing Assistance 5  Supervision/Setup   LB Dressing Assistance 2  Maximal Assistance   Bed Mobility   Sit to Supine 3  Moderate assistance   Additional items Assist x 2; Increased time required;Verbal cues;LE management   Transfers   Sit to Stand 3  Moderate assistance   Additional items Assist x 2; Increased time required;Verbal cues   Stand to Sit 3  Moderate assistance   Additional items Assist x 2; Increased time required;Verbal cues   Functional Mobility   Functional Mobility 3  Moderate assistance   Additional Comments x2   Additional items Rolling walker   Balance   Static Sitting Fair +   Dynamic Sitting Fair   Static Standing Poor +   Dynamic Standing Poor   Activity Tolerance   Activity Tolerance Patient limited by fatigue;Patient limited by pain   Medical Staff Made Aware nsg, P T    RUE Assessment   RUE Assessment WFL   RUE Strength   RUE Overall Strength Within Functional Limits - able to perform ADL tasks with strength  (3+/5 throughout)   LUE Assessment   LUE Assessment WFL   LUE Strength   LUE Overall Strength Within Functional Limits - able to perform ADL tasks with strength  (3+/5 throughout)   Hand Function   Gross Motor Coordination Functional   Fine Motor Coordination Functional   Sensation   Light Touch No apparent deficits   Proprioception   Proprioception No apparent deficits   Vision-Basic Assessment   Current Vision Wears glasses all the time   Vision - Complex Assessment   Acuity Able to read clock/calendar on wall without difficulty   Perception   Inattention/Neglect Appears intact   Cognition   Overall Cognitive Status Impaired   Arousal/Participation Alert   Attention Attends with cues to redirect   Orientation Level Oriented X4   Memory Decreased recall of precautions   Following Commands Follows one step commands without difficulty   Comments hx memory loss   Assessment   Limitation Decreased ADL status; Decreased UE strength;Decreased Safe judgement during ADL;Decreased cognition;Decreased endurance;Decreased high-level ADLs   Prognosis Fair   Assessment Pt is a 55y/o female admitted to the hospital for an elected s/p L TKR(7/5) 2* hx OA  Pt is currently allowed WBAT L LE  Pt with PMH fibromyalgia, [place], L sided weakness, panic attacks, bipolar, tardive dyskinesia, R TKR(3/22)  PTA pt states that she had assistance with her ADLs, transfers, ambulation--with RW; +falls=6, neg   During initial eval, pt demonstrated deficits with her functional balance, functional mobility, ADL status, transfer safety, b/l UE strength, activity tolerance(currently fair=15-20mins), and cognition(i e judgement/safety)  Pt would benefit from continued OT tx for the above deficits  3-5xwk/1-2wks  Goals   Patient Goals "to get better"   STG Time Frame   (1-7 days)   Short Term Goal #1 Pt will demonstrate improved activity tolerance to good(20-30mins) and standing tolerance to 3-5mins to assist with ADLs  Short Term Goal #2 Pt will demonstrate mod I with their sit-stand transfers to assist with completion of their LE dressing  Short Term Goal  Pt will demonstrate proper walker/transfer safety 100% of the time  LTG Time Frame   (7-14 days)   Long Term Goal #1 Pt will demonstrate g/g- balance with all functional activities  Long Term Goal #2 Pt will demonstrate mod I with their UE and LE bathing/dresssing  Long Term Goal Pt will tolerate continued cognitive/home-safety assessment and appropriate d/c recommendations will be provided  Plan   Treatment Interventions ADL retraining;Functional transfer training;UE strengthening/ROM; Endurance training;Cognitive reorientation;Patient/family training; Compensatory technique education;Continued evaluation   Goal Expiration Date 07/20/22   OT Treatment Day 0   OT Frequency 3-5x/wk Recommendation   OT Discharge Recommendation Post acute rehabilitation services   AM-PAC Daily Activity Inpatient   Lower Body Dressing 2   Bathing 2   Toileting 3   Upper Body Dressing 3   Grooming 3   Eating 4   Daily Activity Raw Score 17   Daily Activity Standardized Score (Calc for Raw Score >=11) 37 26   AM-PAC Applied Cognition Inpatient   Following a Speech/Presentation 3   Understanding Ordinary Conversation 3   Taking Medications 2   Remembering Where Things Are Placed or Put Away 2   Remembering List of 4-5 Errands 2   Taking Care of Complicated Tasks 2   Applied Cognition Raw Score 14   Applied Cognition Standardized Score 32 02   Shalini Hensley, OT

## 2022-07-06 NOTE — UTILIZATION REVIEW
Initial Clinical Review    Elective    IP     surgical procedure    Age/Sex: 60 y o  female     Surgery Date:     7/5/22    Procedure: ARTHROPLASTY KNEE TOTAL (Left)       Anesthesia:    spinal      POD#1 Progress Note:   7/6   Continue post op care  Continue pain control as needed  Slightly  Hypotensive and continue  IVF>  Continue  PT/OT/WBAT  LLE  Monitor labs  Hemoglobin this  Am   10 2, was   14 2 1 week ago  Continue IV  Venofer        Admission Orders: Date/Time/Statement:   Admission Orders (From admission, onward)     Ordered        07/05/22 1433  Inpatient Admission  Once                      Orders Placed This Encounter   Procedures    Inpatient Admission     Standing Status:   Standing     Number of Occurrences:   1     Order Specific Question:   Level of Care     Answer:   Med Surg [16]     Order Specific Question:   Estimated length of stay     Answer:   Inpatient Only Surgery     Vital Signs: /71   Pulse 74   Temp 97 5 °F (36 4 °C) (Temporal)   Resp 18   Ht 5' (1 524 m)   Wt 80 7 kg (177 lb 14 6 oz)   SpO2 99%   Breastfeeding No   BMI 34 75 kg/m²     Pertinent Labs/Diagnostic Test Results:     Results from last 7 days   Lab Units 07/01/22  0932   SARS-COV-2  Negative     Results from last 7 days   Lab Units 07/06/22  0523   WBC Thousand/uL 9 45   HEMOGLOBIN g/dL 10 2*   HEMATOCRIT % 32 0*   PLATELETS Thousands/uL 314         Results from last 7 days   Lab Units 07/06/22  0523   SODIUM mmol/L 140   POTASSIUM mmol/L 3 5   CHLORIDE mmol/L 104   CO2 mmol/L 27   ANION GAP mmol/L 9   BUN mg/dL 16   CREATININE mg/dL 0 90   EGFR ml/min/1 73sq m 70   CALCIUM mg/dL 8 1*             Results from last 7 days   Lab Units 07/06/22  0523   GLUCOSE RANDOM mg/dL 111                     Diet:    regular    Mobility:  PT/OT    DVT Prophylaxis:    SCD;S    Medications/Pain Control:   Scheduled Medications:  acetaminophen, 975 mg, Oral, Q8H KESHIA  amLODIPine, 10 mg, Oral, Daily  ascorbic acid, 500 mg, Oral, Daily  atorvastatin, 40 mg, Oral, Daily With Dinner  benztropine, 1 mg, Oral, BID  busPIRone, 20 mg, Oral, TID  docusate sodium, 100 mg, Oral, BID  enoxaparin, 40 mg, Subcutaneous, Daily  folic acid, 4,895 mcg, Oral, Daily  iron sucrose, 300 mg, Intravenous, Daily  lithium carbonate, 300 mg, Oral, HS  pantoprazole, 20 mg, Oral, Early Morning  PARoxetine, 60 mg, Oral, Daily  potassium chloride, 10 mEq, Oral, Daily  propranolol, 10 mg, Oral, BID before breakfast/lunch  QUEtiapine, 50 mg, Oral, HS  senna, 1 tablet, Oral, HS  Valbenazine Tosylate, 80 mg, Oral, Daily      Continuous IV Infusions:  lactated ringers, 100 mL/hr, Intravenous, Continuous      PRN Meds:  calcium carbonate, 1,000 mg, Oral, Daily PRN  HYDROmorphone, 0 5 mg, Intravenous, Q4H PRN   ( x1  7/6 thus far)  hydrOXYzine HCL, 50 mg, Oral, TID PRN  lactated ringers, 1,000 mL, Intravenous, Once PRN   And  lactated ringers, 1,000 mL, Intravenous, Once PRN  LORazepam, 0 5 mg, Oral, BID PRN  oxyCODONE, 10 mg, Oral, Q4H PRN  oxyCODONE, 5 mg, Oral, Q4H PRN  polyethylene glycol, 17 g, Oral, Daily PRN  sodium chloride, 1,000 mL, Intravenous, Once PRN   And  sodium chloride, 1,000 mL, Intravenous, Once PRN        Network Utilization Review Department  ATTENTION: Please call with any questions or concerns to [phone] and carefully listen to the prompts so that you are directed to the right person  All voicemails are confidential   Nadine Ling all requests for admission clinical reviews, approved or denied determinations and any other requests to dedicated fax number below belonging to the campus where the patient is receiving treatment   List of dedicated fax numbers for the Facilities:  [address] DENIALS (Administrative/Medical Necessity) [phone]   [address] (Maternity/NICU/Pediatrics) [address] [place] [address],[address] [place] [address] [phone]   Shira Bales [address] [facility] [address] [facility] [place] [address] [address] [place] [address] Lorenza Baker [address] [place] [address] [phone]

## 2022-07-06 NOTE — ASSESSMENT & PLAN NOTE
Mood is stable, continue Seroquel 50 mg HS, lithium 300 mg HS, Paxil 60 mg daily, BuSpar 20 mg t i d , Valbenazine 80mg daily  · Atarax 50 mg t i d  p r n

## 2022-07-06 NOTE — PROGRESS NOTES
Progress Note - Orthopedics   Samantha Leon 60 y o  female MRN: [id number]  Unit/Bed#: E2 -01 Encounter: [id number]    Assessment:  60 y o  female s/p left total knee arthroplasty POD 1, ABLA    Plan:  · ABLA- postop  Slightly hypotensive  Receiving IVF  Continue to monitor  · Pain control prn  · PT/OT- WBAT left LE   · Lovenox/TEDs/SCDs for DVT prophylaxis  Patient will be dc with lovenox x 4 weeks  · Abx x 24h  · Dc planning- patient and family would like dc home with [facility]  PT aware  Subjective: Pt S&E  Resting comfortably, sleeping upon entering the room  Patient states she is having left knee pain but this is improved with medications  States she did not work with PT yesterday due to numbness in her legs  Patient notes some abd pain and has not passed gas yet  Patient does not want to go to rehab after discharge and would prefer to go home  Denies fevers, chills, distal numbness, or tingling  Vitals: Blood pressure 119/82, pulse 74, temperature 97 5 °F (36 4 °C), temperature source Temporal, resp  rate 19, height 5' (1 524 m), weight 80 7 kg (177 lb 14 6 oz), SpO2 99 %, not currently breastfeeding  ,Body mass index is 34 75 kg/m²  Intake/Output Summary (Last 24 hours) at 7/6/2022 0747  Last data filed at 7/6/2022 7905  Gross per 24 hour   Intake 2026 25 ml   Output 450 ml   Net 1576 25 ml       Invasive Devices  Report    Peripheral Intravenous Line  Duration           Peripheral IV 07/05/22 Dorsal (posterior); Left Hand <1 day          Drain  Duration           Urethral Catheter Latex 16 Fr  <1 day                Ortho Exam: Eleanor Mayorga:  Drsg:  C/D/I, sensation grossly intact L4, L5, S1, palpable pedal pulse, EHL/AT/GS intact    Lab, Imaging and other studies:   CBC:   Lab Results   Component Value Date    WBC 9 45 07/06/2022    HGB 10 2 (L) 07/06/2022    HCT 32 0 (L) 07/06/2022    MCV 90 07/06/2022     07/06/2022    MCH 28 7 07/06/2022    MCHC 31 9 07/06/2022    RDW 14 2 07/06/2022 MPV 9 0 07/06/2022     CMP:   Lab Results   Component Value Date    SODIUM 140 07/06/2022     07/06/2022    CO2 27 07/06/2022    BUN 16 07/06/2022    CREATININE 0 90 07/06/2022    CALCIUM 8 1 (L) 07/06/2022    EGFR 70 07/06/2022

## 2022-07-06 NOTE — UTILIZATION REVIEW
Inpatient Admission Authorization Request   NOTIFICATION OF INPATIENT ADMISSION/INPATIENT AUTHORIZATION REQUEST   SERVICING FACILITY:   [address] [place] [address], [address]   Tax ID: [id number]  NPI: [id number]  Place of Service: Inpatient [facility]  Place of Service Code: 24     ATTENDING PROVIDER:  Attending Name and NPI#: Erendira Tan [[id number]]  Address: [address] [place] [address], [address]   Phone: [phone]     UTILIZATION REVIEW CONTACT:  Mindy Chen Utilization   Network Utilization Review Department  Phone: [phone]  Fax: [phone]  Email: [email or web address]     PHYSICIAN ADVISORY SERVICES:  FOR [facility] REVIEW - MEDICAL NECESSITY DENIAL  Phone: [phone]  Fax: [phone]  Email: [email or web address]     TYPE OF REQUEST:  Inpatient Status     ADMISSION INFORMATION:  ADMISSION DATE/TIME: 7/5/22  2:33 PM  PATIENT DIAGNOSIS CODE/DESCRIPTION:  Primary osteoarthritis of left knee [M17 12]  DISCHARGE DATE/TIME: No discharge date for patient encounter  IMPORTANT INFORMATION:  Please contact Mindy Chen directly with any questions or concerns regarding this request  Department voicemails are confidential     Send requests for admission clinical reviews, concurrent reviews, approvals, and administrative denials due to lack of clinical to fax [phone]

## 2022-07-06 NOTE — PLAN OF CARE
Problem: PHYSICAL THERAPY ADULT  Goal: Performs mobility at highest level of function for planned discharge setting  See evaluation for individualized goals  Description: Treatment/Interventions: Functional transfer training, LE strengthening/ROM, Elevations, Therapeutic exercise, Endurance training, Patient/family training, Bed mobility, Gait training, Spoke to nursing, OT          See flowsheet documentation for full assessment, interventions and recommendations  Note: Prognosis: Fair  Problem List: Decreased strength, Decreased range of motion, Decreased endurance, Impaired balance, Decreased mobility, Decreased cognition, Decreased safety awareness, Impaired judgement, Pain  Assessment: Pt  60 y  o female Status post total knee replacement, left 2* to  Primary osteoarthritis of left knee (M17 12)  Pt referred to PT for mobility assessment & D/C planning  Please see above for information re: home set-up & PLOF as well as objective findings during PT assessment  On eval, pt functioning below baseline hence will continue skilled PT to improve function & safety  Pt require modAx2 for most functional mobility + cues for techniques & safety  Gait slow & unsteady w/ dec foot clearance & strides but no gross LOB noted  Require seated rest period in between amb trials 2* to pain & fatigue  Require frequent cues for gait sequencing & assist for RW mgt as well  Pt also demonstrates unsafe techniques during mobility training despite cues  The patient's AM-PAC Basic Mobility Inpatient Short Form Raw Score is 11  A Raw score of less than or equal to 16 suggests the patient may benefit from discharge to post-acute rehabilitation services  Please also refer to the recommendation of the Physical Therapist for safe discharge planning  From PT standpoint, due to above mentioned deficits, inaccessible home & high risk for falls, pt will benefit from inpt rehab at D/C  No SOB & dizziness reported t/o session   Nsg staff most recent vital signs as follows: /71   Pulse 74   Temp 97 5 °F (36 4 °C) (Temporal)   Resp 18   Ht 5' (1 524 m)   Wt 80 7 kg (177 lb 14 6 oz)   SpO2 99%   Breastfeeding No   BMI 34 75 kg/m²   At end of session, pt back in bed in stable condition, call bell & phone in reach, bed alarm activated, all lines intact  Fall precautions reinforced w/ good understanding  CM to follow  Nsg staff to continue to mobilized pt (OOB in chair for all meals & ambulate in room/unit) as tolerated to prevent further decline in function  Nsg notified  Co-eval was necessary to complete this PT eval for the pt's best interest given pt's medical acuity & complexity  Barriers to Discharge: Inaccessible home environment  Barriers to Discharge Comments: stairs     PT Discharge Recommendation: Post acute rehabilitation services          See flowsheet documentation for full assessment

## 2022-07-06 NOTE — PHYSICAL THERAPY NOTE
PT EVALUATION    Pt  Name: Mallika Hopkins  Pt  Age: 60 y o  MRN: [id number]  LENGTH OF STAY: 1      Admitting Diagnoses:   Primary osteoarthritis of left knee [M17 12]    Past Medical History:   Diagnosis Date    Anxiety     Arthritis     left knee    At risk for falls     Bipolar 2 disorder (HCC)     FOLLOWS WITH PSYCHIATRIST  CONTINUE LAMOTRIGINE; RESOLVED: [id number]    Depression     Familial tremor     both hands    Fibromyalgia     LAST ASSESSED: [id number]    GERD (gastroesophageal reflux disease)     Hearing aid worn     left ear    [place] (hard of hearing)     left ear    Hyperlipidemia     Hypertension     Left-sided weakness     Memory loss of unknown cause     long and short term    Migraine     Obesity     Obesity, Class II, BMI 35-39 9     Overactive bladder     Panic attack     Post traumatic stress disorder     S/P insertion of spinal cord stimulator     no remote    Seasonal allergies     Seizures ([address] )     possible seizure like activity    Stroke [facility])     questionable stroke 2009    Tardive dyskinesia     PATIENT STATES    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    CONT ASA 81 MG DAILY; RESOLVED: [id number]    Urinary incontinence     Uses walker     Wears dentures     partial lower / full upper- doesnt wear    Wears glasses        Past Surgical History:   Procedure Laterality Date    BACK SURGERY       SECTION      COLONOSCOPY      RESOLVED: [id number]    EAR SURGERY      EGD      HYSTERECTOMY      MYRINGOTOMY W/ TUBES Left     NECK SURGERY  2019    DE CYSTOURETHROSCOPY N/A 2016    Procedure: CYSTOSCOPY, BOTOX INJECTION;  Surgeon: Kirstie Hernandez MD;  [facility];  Service: Gynecology    DE IMPLANT SPINAL NEUROSTIM/ Right 2/10/2021    Procedure: REPLACEMENT IMPLANTABLE PULSE GENERATOR DORSAL SPINAL COLUMN STIMULATOR, RIGHT;  Surgeon: Chioma Wall MD;  Location:  MAIN OR;  Service: Neurosurgery    DE PERCUT IMPLNT NEUROELECT,EPIDURAL Right 7/28/2020    Procedure: INSERTION THORACIC DORSAL COLUMN SPINAL CORD STIMULATOR PERCUTANEOUS W IMPLANTABLE PULSE GENERATOR, RIGHT;  Surgeon: Daija Blas MD;  Location: [facility];  Service: Neurosurgery    DE TOTAL KNEE ARTHROPLASTY Right 3/9/2022    Procedure: ARTHROPLASTY KNEE TOTAL;  Surgeon: Jude Neri DO;  [facility];  Service: Orthopedics    DE TOTAL KNEE ARTHROPLASTY Left 7/5/2022    Procedure: ARTHROPLASTY KNEE TOTAL;  Surgeon: Jude Neri DO;  [facility];  Service: Orthopedics    TONSILLECTOMY      TUBAL LIGATION  1986    UPPER GASTROINTESTINAL ENDOSCOPY  09/2020       Imaging Studies:  No orders to display         07/06/22 1014   PT Last Visit   PT Visit Date 07/06/22   Note Type   Note type Evaluation   Pain Assessment   Pain Score 9   Pain Location/Orientation Orientation: Left; Location: Knee   [facility] Pain Intervention(s) Medication (See MAR); Cold applied;Repositioned; Ambulation/increased activity; Emotional support; Rest   Restrictions/Precautions   LLE Weight Bearing Per Order WBAT   Other Precautions Fall Risk;Pain   Home Living   Type of [address] [place] [address] One level;Stairs to enter with rails  (12STE w/ 1HR)   Bathroom Shower/Tub   (sponge bathe at baseline)   [address]; Wheelchair-manual   Additional Comments pt limited historian hence ?accuracy of information provided by pt  Prior Function   Level of [place] Needs assistance with ADLs and functional mobility  (w/ RW)   Lives With Other (Comment)  (daughter, CLARENCE & grandchildren)   Receives Help From Family  (CLARENCE is pt's paid caregiver)   ADL Assistance Needs assistance   Falls in the last 6 months 5 to 10  (6x)   Comments pt limited historian hence ?accuracy of information provided by pt     General   Additional Pertinent History h/o R TKR 3/9/22; tardive dyskinesia   Family/Caregiver Present No   Cognition   Overall Cognitive Status Impaired   Arousal/Participation Alert   Orientation Level Oriented to person;Oriented to place;Oriented to time   Following Commands Follows one step commands with increased time or repetition   Subjective   Subjective Pt agreeable to PT/OT traals  RUE Assessment   RUE Assessment   (refer to OT)   LUE Assessment   LUE Assessment   (refer to OT)   RLE Assessment   RLE Assessment WFL  (4-/5 grossly except hip 3+/5)   LLE Assessment   LLE Assessment X  (3-/5 grossly)   Coordination   Sensation X  (intermittent numbness & tingling BLE)   Bed Mobility   Sit to Supine 3  Moderate assistance   Additional items Assist x 2; Increased time required;Verbal cues;LE management   Additional Comments cues for techniques & safety   Transfers   Sit to Stand 3  Moderate assistance   Additional items Assist x 2; Increased time required;Verbal cues;Armrests   Stand to Sit 3  Moderate assistance   Additional items Assist x 2; Increased time required;Verbal cues;Armrests   Additional Comments cues for controlled descent, hand placement & LLE positioning   Ambulation/Elevation   Gait pattern Antalgic; Forward Flexion;Decreased foot clearance;Decreased L stance; Short stride; Step to;Excessively slow   Gait Assistance 3  Moderate assist   Additional items Assist x 2;Verbal cues; Tactile cues   Assistive Device Rolling walker   Distance 10'x2   Ambulation/Elevation Additional Comments unsteady gait; frequent cues for gait sequencing; assist w/ RW mgt; requires seated rest in between amb trials; pt tends to let go of RW when her arms gets fatigued despite cues   Balance   Static Sitting Fair +   Dynamic Sitting Fair   Static Standing Poor +   Dynamic Standing Poor   Ambulatory Poor   Endurance Deficit   Endurance Deficit Yes   Endurance Deficit Description pain; weakness; fatigue   Activity Tolerance   Activity Tolerance Patient limited by fatigue;Patient limited by pain   Medical Staff Made Aware OT Mary Rodriguez   Nurse Made Aware MARIA Clark Assessment   Prognosis Fair   Problem List Decreased strength;Decreased range of motion;Decreased endurance; Impaired balance;Decreased mobility; Decreased cognition;Decreased safety awareness; Impaired judgement;Pain   Assessment Pt  60 y  o female Status post total knee replacement, left 2* to  Primary osteoarthritis of left knee (M17 12)  Pt referred to PT for mobility assessment & D/C planning  Please see above for information re: home set-up & PLOF as well as objective findings during PT assessment  On eval, pt functioning below baseline hence will continue skilled PT to improve function & safety  Pt require modAx2 for most functional mobility + cues for techniques & safety  Gait slow & unsteady w/ dec foot clearance & strides but no gross LOB noted  Require seated rest period in between amb trials 2* to pain & fatigue  Require frequent cues for gait sequencing & assist for RW mgt as well  Pt also demonstrates unsafe techniques during mobility training despite cues  The patient's AM-PAC Basic Mobility Inpatient Short Form Raw Score is 11  A Raw score of less than or equal to 16 suggests the patient may benefit from discharge to post-acute rehabilitation services  Please also refer to the recommendation of the Physical Therapist for safe discharge planning  From PT standpoint, due to above mentioned deficits, inaccessible home & high risk for falls, pt will benefit from inpt rehab at D/C  No SOB & dizziness reported t/o session  Nsg staff most recent vital signs as follows: /71   Pulse 74   Temp 97 5 °F (36 4 °C) (Temporal)   Resp 18   Ht 5' (1 524 m)   Wt 80 7 kg (177 lb 14 6 oz)   SpO2 99%   Breastfeeding No   BMI 34 75 kg/m²   At end of session, pt back in bed in stable condition, call bell & phone in reach, bed alarm activated, all lines intact  Fall precautions reinforced w/ good understanding  CM to follow   Nsg staff to continue to mobilized pt (OOB in chair for all meals & ambulate in room/unit) as tolerated to prevent further decline in function  Nsg notified  Co-eval was necessary to complete this PT eval for the pt's best interest given pt's medical acuity & complexity  Barriers to Discharge Inaccessible home environment   Barriers to Discharge Comments stairs   Goals   Patient Goals to get better   STG Expiration Date 07/13/22   Short Term Goal #1 Goals to be met in 7 days; pt will be able to: 1) inc strength & balance by 1/2 grade to improve overall functional mobility & dec fall risk; 2) inc bed mobility to S for pt to be able to get in/OOB safely w/ proper techniques 100% of the time, to dec caregiver burden & safely function at home; 3) inc transfers to S for pt to transition safely from one surface to another w/o % of the time, to dec caregiver burden & safely function at home; 4) inc amb w/ RW approx  >80' w/ S for pt to ambulate household distances w/o any % of the time, to dec caregiver burden & safely function at home; 5) negotiate stairs w/ S for inc safety during stair mgt inside/outside of home & dec caregiver burden; 6) pt/caregiver ed   PT Treatment Day 0   Plan   Treatment/Interventions Functional transfer training;LE strengthening/ROM; Elevations; Therapeutic exercise; Endurance training;Patient/family training;Bed mobility;Gait training;Spoke to nursing;OT   PT Frequency Twice a day   Recommendation   PT Discharge Recommendation Post acute rehabilitation services   AM-PAC Basic Mobility Inpatient   Turning in Bed Without Bedrails 2   Lying on Back to Sitting on Edge of Flat Bed 2   Moving Bed to Chair 2   Standing Up From Chair 2   Walk in Room 2   Climb 3-5 Stairs 1   Basic Mobility Inpatient Raw Score 11   Basic Mobility Standardized Score 30 25   Highest Level Of Mobility   -HLM Goal 4: Move to chair/commode   JH-HLM Achieved 6: Walk 10 steps or more   End of Consult   Patient Position at End of Consult Supine;Bed/Chair alarm activated; All needs within reach   End of Consult Comments Pt in stable condition at end of session  All needs in reach  Bed alarm activated     Hx/personal factors: co-morbidities, inaccessible home, use of AD, dec cognition, pain, h/o of falls, fall risk, and assist w/ ADL's  Examination: dec mobility, dec balance, dec endurance, dec amb, risk for falls, pain, dec cognition  Clinical: unpredictable (ongoing medical status, abnormal lab values, risk for falls, and pain mgt)  Complexity: high    Serge Dense, PT

## 2022-07-06 NOTE — PLAN OF CARE
Problem: OCCUPATIONAL THERAPY ADULT  Goal: Performs self-care activities at highest level of function for planned discharge setting  See evaluation for individualized goals  Description: Treatment Interventions: ADL retraining, Functional transfer training, UE strengthening/ROM, Endurance training, Cognitive reorientation, Patient/family training, Compensatory technique education, Continued evaluation          See flowsheet documentation for full assessment, interventions and recommendations  Note: Limitation: Decreased ADL status, Decreased UE strength, Decreased Safe judgement during ADL, Decreased cognition, Decreased endurance, Decreased high-level ADLs  Prognosis: Fair  Assessment: Pt is a 55y/o female admitted to the hospital for an elected s/p L TKR(7/5) 2* hx OA  Pt is currently allowed WBAT L LE  Pt with PMH fibromyalgia, [place], L sided weakness, panic attacks, bipolar, tardive dyskinesia, R TKR(3/22)  PTA pt states that she had assistance with her ADLs, transfers, ambulation--with RW; +falls=6, neg   During initial eval, pt demonstrated deficits with her functional balance, functional mobility, ADL status, transfer safety, b/l UE strength, activity tolerance(currently fair=15-20mins), and cognition(i e judgement/safety)  Pt would benefit from continued OT tx for the above deficits  3-5xwk/1-2wks       OT Discharge Recommendation: Post acute rehabilitation services

## 2022-07-06 NOTE — CONSULTS
[address] 1965, 60 y o  female MRN: [id number]  Unit/Bed#: E2 -01 Encounter: [id number]  Primary Care Provider: Suraj Corbett DO   Date and time admitted to hospital: 7/5/2022  8:04 AM    Inpatient consult to Internal Medicine  Consult performed by: Jenn Marshall PA-C  Consult ordered by: Juan Diego Back PA-C          * Status post total knee replacement, left  Assessment & Plan  Postop day 1  · DVT prophylaxis  · Pain control  · PT/OT  · Management per primary    Anemia  Assessment & Plan  With baseline hemoglobin fluctuating  · Hemoglobin today 10 2, hemoglobin 1 week ago 14 2  · Suspect multifactorial in the setting of recent surgical intervention, aggressive IV hydration  · No acute signs of bleeding at this time  · Continue Venofer, folic acid  · CBC in a m  Mixed hyperlipidemia  Assessment & Plan  Continue Lipitor 40 mg daily    Tardive dyskinesia  Assessment & Plan  Continue Cogentin 1 mg b i d  Essential hypertension  Assessment & Plan  Continue propanolol 10 mg b i d , amlodipine 10 mg daily    Bipolar II disorder (HCC)  Assessment & Plan  Mood is stable, continue Seroquel 50 mg HS, lithium 300 mg HS, Paxil 60 mg daily, BuSpar 20 mg t i d , Valbenazine 80mg daily  · Atarax 50 mg t i d  p r n  VTE Prophylaxis: VTE Score: 9 High Risk (Score >/= 5) - Pharmacological DVT Prophylaxis Ordered: enoxaparin (Lovenox)  Sequential Compression Devices Ordered  Recommendations for Discharge:  · Continue home medication    Counseling / Coordination of Care Time: 45 minutes Greater than 50% of total time spent on patient counseling and coordination of care  Collaboration of Care: Were Recommendations Directly Discussed with Primary Treatment Team? No    History of Present Illness:  Samantha Pagan is a 60 y o  female who is originally admitted to the orthopedic service due to left total knee replacement   We are consulted for management of bipolar disorder, hypertension, hyperlipidemia  The patient has a past medical history of severe bilateral knee osteoarthritis, chronic pain syndrome, opioid dependence, tardive dyskinesia, hypertension, hyperlipidemia, bipolar disorder  She underwent left total knee replacement on 07/05/2022  The patient is maintained on multiple psychiatric medications which according to outpatient notes, patient is questionably compliant with  She does have an outpatient  which follows her case closely as well as a   It appears that she lives with family members who care for her through the waiver program   She does not offer any complaints at this time other than mild knee pain  She currently has Stanton catheter in place which will be removed for voiding trial today    Review of Systems:  Review of Systems   Constitutional: Negative for appetite change, chills, fever and unexpected weight change  HENT: Negative for hearing loss, rhinorrhea and sore throat  Eyes: Negative for pain, redness and visual disturbance  Respiratory: Negative for cough, chest tightness and shortness of breath  Cardiovascular: Negative for chest pain, palpitations and leg swelling  Gastrointestinal: Negative for constipation, diarrhea, nausea and vomiting  Endocrine: Negative for polydipsia, polyphagia and polyuria  Musculoskeletal: Negative for back pain  Neurological: Negative for dizziness, light-headedness and headaches  Past Medical and Surgical History:   Past Medical History:   Diagnosis Date    Anxiety     Arthritis     left knee    At risk for falls     Bipolar 2 disorder ([facility] [address] )     FOLLOWS WITH PSYCHIATRIST   CONTINUE LAMOTRIGINE; RESOLVED: [id number]    Depression     Familial tremor     both hands    Fibromyalgia     LAST ASSESSED: [id number]    GERD (gastroesophageal reflux disease)     Hearing aid worn     left ear    [place] (hard of hearing)     left ear    Hyperlipidemia     Hypertension     Left-sided weakness     Memory loss of unknown cause     long and short term    Migraine     Obesity     Obesity, Class II, BMI 35-39 9     Overactive bladder     Panic attack     Post traumatic stress disorder     S/P insertion of spinal cord stimulator     no remote    Seasonal allergies     Seizures (HCC)     possible seizure like activity    Stroke [facility])     questionable stroke 2009    Tardive dyskinesia     PATIENT STATES    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    CONT ASA 81 MG DAILY; RESOLVED: [id number]    Urinary incontinence     Uses walker     Wears dentures     partial lower / full upper- doesnt wear    Wears glasses        Past Surgical History:   Procedure Laterality Date    BACK SURGERY       SECTION      COLONOSCOPY      RESOLVED: [id number]    EAR SURGERY      EGD      HYSTERECTOMY      MYRINGOTOMY W/ TUBES Left     NECK SURGERY  2019    TN CYSTOURETHROSCOPY N/A 2016    Procedure: CYSTOSCOPY, BOTOX INJECTION;  Surgeon: Paula Payne MD;  [facility];  Service: Gynecology    TN IMPLANT SPINAL NEUROSTIM/ Right 2/10/2021    Procedure: REPLACEMENT IMPLANTABLE PULSE GENERATOR DORSAL SPINAL COLUMN STIMULATOR, RIGHT;  Surgeon: Liss Hunter MD;  [facility];  Service: Neurosurgery    TN PERCUT IMPLNT Pete Suero Right 2020    Procedure: INSERTION THORACIC DORSAL COLUMN SPINAL CORD STIMULATOR PERCUTANEOUS W IMPLANTABLE PULSE GENERATOR, RIGHT;  Surgeon: Liss Hunter MD;  [facility];  Service: Neurosurgery    TN TOTAL KNEE ARTHROPLASTY Right 3/9/2022    Procedure: ARTHROPLASTY KNEE TOTAL;  Surgeon: Jose Armando Lopez DO;  [facility];  Service: Orthopedics    TN TOTAL KNEE ARTHROPLASTY Left 2022    Procedure: ARTHROPLASTY KNEE TOTAL;  Surgeon: Jose Armando Lopez DO;  [facility];  Service: Orthopedics    TONSILLECTOMY      TUBAL LIGATION  1986    UPPER GASTROINTESTINAL ENDOSCOPY  09/2020       Meds/Allergies:  all medications and allergies reviewed    Allergies: No Known Allergies    Social History:  Marital Status:   Substance Use History:   Social History     Substance and Sexual Activity   Alcohol Use Not Currently    Comment: 2 x year; being a social drinker as per all scripts      Social History     Tobacco Use   Smoking Status Never Smoker   Smokeless Tobacco Never Used     Social History     Substance and Sexual Activity   Drug Use No       Family History:  Family History   Problem Relation Age of Onset    Colon cancer Mother     Alzheimer's disease Father     Stroke Father     Colon cancer Brother     Bipolar disorder Brother     Breast cancer Maternal Aunt     Colon cancer Maternal Aunt     Heart disease Other     Diabetes Other     Hypertension Other     Seizures Son     Depression Paternal Grandfather     No Known Problems Sister     No Known Problems Brother     Thyroid disease Neg Hx        Physical Exam:   Vitals:   Blood Pressure: 110/71 (07/06/22 1035)  Pulse: 74 (07/06/22 1035)  Temperature: 97 5 °F (36 4 °C) (07/06/22 0736)  Temp Source: Temporal (07/06/22 0736)  Respirations: 18 (07/06/22 0911)  Height: 5' (152 4 cm) (07/05/22 0825)  Weight - Scale: 80 7 kg (177 lb 14 6 oz) (07/05/22 0825)  SpO2: 99 % (07/06/22 0736)    Physical Exam  Vitals and nursing note reviewed  Constitutional:       General: She is not in acute distress  Appearance: Normal appearance  She is not ill-appearing, toxic-appearing or diaphoretic  Eyes:      General: No scleral icterus  Conjunctiva/sclera: Conjunctivae normal    Cardiovascular:      Rate and Rhythm: Normal rate and regular rhythm  Heart sounds: No murmur heard  No friction rub  No gallop  Pulmonary:      Effort: Pulmonary effort is normal  No respiratory distress  Breath sounds: Normal breath sounds  No stridor  No wheezing, rhonchi or rales  Chest:      Chest wall: No tenderness  Abdominal:      General: Abdomen is flat  Bowel sounds are normal  There is no distension  Palpations: Abdomen is soft  Tenderness: There is no abdominal tenderness  Skin:     General: Skin is warm and dry  Coloration: Skin is not jaundiced or pale  Neurological:      General: No focal deficit present  Mental Status: She is alert and oriented to person, place, and time  Mental status is at baseline  Psychiatric:      Comments: Involuntary tongue movements          Additional Data:   Lab Results:    Results from last 7 days   Lab Units 07/06/22  0523   WBC Thousand/uL 9 45   HEMOGLOBIN g/dL 10 2*   HEMATOCRIT % 32 0*   PLATELETS Thousands/uL 314     Results from last 7 days   Lab Units 07/06/22  0523   SODIUM mmol/L 140   POTASSIUM mmol/L 3 5   CHLORIDE mmol/L 104   CO2 mmol/L 27   BUN mg/dL 16   CREATININE mg/dL 0 90   ANION GAP mmol/L 9   CALCIUM mg/dL 8 1*   GLUCOSE RANDOM mg/dL 111             Lab Results   Component Value Date/Time    HGBA1C 4 8 06/28/2022 12:53 PM    HGBA1C 4 7 03/05/2022 08:54 AM    HGBA1C 4 6 03/01/2022 09:51 AM    HGBA1C 4 8 02/08/2021 05:56 AM    HGBA1C 4 8 02/08/2021 12:00 AM    HGBA1C 5 0 09/04/2020 09:22 AM               Imaging: No pertinent imaging reviewed  No orders to display     ** Please Note: This note may have been constructed using a voice recognition system   **

## 2022-07-06 NOTE — PHYSICAL THERAPY NOTE
PT PROGRESS NOTE    Name: Mallika Hopkins  AGE: 60 y o    MRN: [id number]  LENGTH OF STAY: 1    Admitting Dx:  Primary osteoarthritis of left knee [M17 12]      Patient Active Problem List   Diagnosis    Right knee pain    Chronic pain disorder    Lumbar radiculopathy    Lumbar spondylosis    Fibromyalgia    Lumbar disc disease with radiculopathy    Spinal stenosis of lumbar region with neurogenic claudication    Pain in joint, shoulder region    Intractable low back pain    Bilateral hip pain    Bipolar II disorder (HCC)    Cognitive disorder    Esophageal reflux    Fatigue    Female pelvic pain    Generalized anxiety disorder    Essential hypertension    History of hypokalemia    Insomnia    Major depressive disorder, recurrent episode, moderate degree (HCC)    Migraine    Opioid dependence (HCC)    Osteoarthritis of both hips    Osteoarthritis of knee    Overactive bladder    Left hip pain    Panic disorder without agoraphobia    Post traumatic stress disorder    Primary localized osteoarthritis of both knees    Recent unexplained weight loss    Sacroiliitis (HCC)    Tremor    Urinary incontinence    Vitamin D deficiency    Post laminectomy syndrome    Encounter for long-term use of opiate analgesic    Family history of colorectal cancer    Complaint related to dreams    MIMI (obstructive sleep apnea)    Tardive dyskinesia    Primary osteoarthritis of both knees    Patellofemoral disorder of both knees    Rash    Superficial bacterial infection of skin    Scar of back    Impaired skin integrity associated with surgical incision    Status post insertion of spinal cord stimulator    Ambulatory dysfunction    Dysphagia    Arthritis of right knee    Multiple closed fractures of metatarsal bone of right foot    Chronic back pain    Nausea    Encephalopathy    History of CVA (cerebrovascular accident)    Medical clearance for psychiatric admission    Mixed hyperlipidemia    Leukopenia    Preoperative evaluation to rule out surgical contraindication    CVA (cerebral vascular accident) ([facility] [address] )    S/P total knee arthroplasty, right    Constipation    Hypokalemia    Seizure-like activity ([facility] [address] )    Status post total knee replacement, left    Anemia               07/06/22 1558   PT Last Visit   PT Visit Date 07/06/22   Note Type   Note Type BID visit/treatment   Pain Assessment   Pain Score 10 - Worst Possible Pain   Pain Location/Orientation Orientation: Left; Location: Knee   Hospital Pain Intervention(s) Medication (See MAR); Cold applied;Repositioned; Ambulation/increased activity; Emotional support; Rest   Restrictions/Precautions   LLE Weight Bearing Per Order WBAT   Other Precautions Fall Risk;Pain   General   Chart Reviewed Yes   Family/Caregiver Present No   Cognition   Overall Cognitive Status Impaired   Arousal/Participation Alert   Attention Attends with cues to redirect   Orientation Level Oriented to person;Oriented to place   Following Commands Follows one step commands with increased time or repetition   Subjective   Subjective Pt requested to use the bathroom upon my arrival    Bed Mobility   Supine to Sit 3  Moderate assistance   Additional items Assist x 1;HOB elevated; Bedrails; Increased time required;Verbal cues;LE management   Sit to Supine 3  Moderate assistance   Additional items Assist x 1; Increased time required;Verbal cues;LE management   Additional Comments cues for techniques & safety   Transfers   Sit to Stand 3  Moderate assistance   Additional items Assist x 1; Increased time required;Verbal cues   Stand to Sit 3  Moderate assistance   Additional items Assist x 1; Increased time required;Verbal cues;Armrests   Toilet transfer 3  Moderate assistance   Additional items Assist x 1; Armrests; Increased time required;Verbal cues; Commode   Additional Comments cues for hand placement & LLE positioning   Ambulation/Elevation   Gait pattern Antalgic;Decreased foot clearance; Forward Flexion;Decreased L stance; Short stride; Step to;Excessively slow   Gait Assistance 3  Moderate assist   Additional items Assist x 1;Verbal cues; Tactile cues   Assistive Device Rolling walker   Distance 3'x1 + 5'x1   Ambulation/Elevation Additional Comments seated rest in between amb trials 2* to inc pain; unsteady gait; dec amb tolerance 2* to pain   Balance   Static Sitting Fair +   Dynamic Sitting Fair   Static Standing Poor +   Dynamic Standing Poor   Ambulatory Poor   Endurance Deficit   Endurance Deficit Yes   Endurance Deficit Description pain; weakness; fatigue   Activity Tolerance   Activity Tolerance Patient limited by fatigue;Patient limited by pain   Nurse Made Aware RN Fransisca   Exercises   TKR Supine;10 reps;AAROM; Left   Assessment   Prognosis Fair   Problem List Decreased strength;Decreased range of motion;Decreased endurance; Impaired balance;Decreased mobility; Decreased cognition; Impaired judgement;Decreased safety awareness   Assessment Pt seen for PT per POC  Pt still in severe post-op pain hence limiting overall mobility  Pt require modAx1 for bed mobility, balance, & amb w/ RW + max cues for techniques & safety  Gait still slow, antalgic & unsteady but no gross LOB noted  Dec amb tolerance 2* to severe L knee pain  Please see flowsheet above for objective findings & levels of assistance required for all functional tasks during this tx session  Pt tolerated above mentioned thera  ex fairly + rest period in between exercises 2* to pain  No SOB & dizziness reported t/o session  Nsg staff most recent vital signs as follows: /69 (BP Location: Right arm)   Pulse 88   Temp 98 9 °F (37 2 °C) (Temporal)   Resp 18   Ht 5' (1 524 m)   Wt 80 7 kg (177 lb 14 6 oz)   SpO2 96%   Breastfeeding No   BMI 34 75 kg/m²   Will continue PT per POC  At end of session, pt back in bed in stable condition, call bell & phone in reach, bed alarm activated  Fall precautions reinforced w/ good understanding   The patient's AM-PAC Basic Mobility Inpatient Short Form Raw Score is 11  A Raw score of less than or equal to 16 suggests the patient may benefit from discharge to post-acute rehabilitation services  Please also refer to the recommendation of the Physical Therapist for safe discharge planning  From PT standpoint, will continue to recommend inpt rehab at D/C  CM to follow  Nsg staff to continue to mobilized pt (OOB in chair for all meals & ambulate in room/unit) as tolerated to prevent decline in function  Nsg notified  Barriers to Discharge Inaccessible home environment   Barriers to Discharge Comments stairs   Goals   Patient Goals to have less pain   STG Expiration Date 07/13/22   PT Treatment Day 1   Plan   Treatment/Interventions Functional transfer training;LE strengthening/ROM; Elevations; Therapeutic exercise; Endurance training;Patient/family training;Bed mobility;Gait training;Spoke to nursing;Spoke to case management   Progress Slow progress, decreased activity tolerance   PT Frequency Twice a day   Recommendation   PT Discharge Recommendation Post acute rehabilitation services   AM-PAC Basic Mobility Inpatient   Turning in Bed Without Bedrails 3   Lying on Back to Sitting on Edge of Flat Bed 2   Moving Bed to Chair 2   Standing Up From Chair 2   Walk in Room 2   Climb 3-5 Stairs 1   Basic Mobility Inpatient Raw Score 12   Basic Mobility Standardized Score 32 23   Highest Level Of Mobility   -HLM Goal 4: Move to chair/commode   -HLM Achieved 4: Move to chair/commode   End of Consult   Patient Position at End of Consult Supine;Bed/Chair alarm activated; All needs within reach   End of Consult Comments Pt in stable condition at end of session  All needs in reach  Bed alarm activated     Evelyne Spurling, PT

## 2022-07-06 NOTE — TELEPHONE ENCOUNTER
Hardeep Camacho,  and myself, nurse care manager returned call to [facility] worker  No answer and message left with both of our contact numbers  Please see our patient outreach notes from 7/6/22

## 2022-07-06 NOTE — PROGRESS NOTES
HIRAM and Alfreda Multani RN/CM attempted to return call to [facility] of [address] but had to leave a message  CM Team to remain available to assist as indicated  ADDENDUM  7/7/22    HIRAM and Alfreda Multani RN/CM again attempted to return call to [facility] @ [phone] but again needed to leave a message  CM Team to remain available to assist as indicated

## 2022-07-07 ENCOUNTER — PATIENT OUTREACH (OUTPATIENT)
Dept: INTERNAL MEDICINE CLINIC | Facility: CLINIC | Age: 57
End: 2022-07-07

## 2022-07-07 LAB
ANION GAP SERPL CALCULATED.3IONS-SCNC: 11 MMOL/L (ref 4–13)
BUN SERPL-MCNC: 10 MG/DL (ref 5–25)
CALCIUM SERPL-MCNC: 7.8 MG/DL (ref 8.3–10.1)
CHLORIDE SERPL-SCNC: 102 MMOL/L (ref 100–108)
CO2 SERPL-SCNC: 26 MMOL/L (ref 21–32)
CREAT SERPL-MCNC: 0.75 MG/DL (ref 0.6–1.3)
ERYTHROCYTE [DISTWIDTH] IN BLOOD BY AUTOMATED COUNT: 14.9 % (ref 11.6–15.1)
GFR SERPL CREATININE-BSD FRML MDRD: 88 ML/MIN/1.73SQ M
GLUCOSE SERPL-MCNC: 94 MG/DL (ref 65–140)
HCT VFR BLD AUTO: 27.4 % (ref 34.8–46.1)
HGB BLD-MCNC: 8.8 G/DL (ref 11.5–15.4)
MCH RBC QN AUTO: 29.1 PG (ref 26.8–34.3)
MCHC RBC AUTO-ENTMCNC: 32.1 G/DL (ref 31.4–37.4)
MCV RBC AUTO: 91 FL (ref 82–98)
PLATELET # BLD AUTO: 254 THOUSANDS/UL (ref 149–390)
PMV BLD AUTO: 9.1 FL (ref 8.9–12.7)
POTASSIUM SERPL-SCNC: 3.2 MMOL/L (ref 3.5–5.3)
RBC # BLD AUTO: 3.02 MILLION/UL (ref 3.81–5.12)
SODIUM SERPL-SCNC: 139 MMOL/L (ref 136–145)
WBC # BLD AUTO: 7.32 THOUSAND/UL (ref 4.31–10.16)

## 2022-07-07 PROCEDURE — 99024 POSTOP FOLLOW-UP VISIT: CPT | Performed by: PHYSICIAN ASSISTANT

## 2022-07-07 PROCEDURE — 97530 THERAPEUTIC ACTIVITIES: CPT

## 2022-07-07 PROCEDURE — 85027 COMPLETE CBC AUTOMATED: CPT | Performed by: PHYSICIAN ASSISTANT

## 2022-07-07 PROCEDURE — 97116 GAIT TRAINING THERAPY: CPT

## 2022-07-07 PROCEDURE — 99232 SBSQ HOSP IP/OBS MODERATE 35: CPT | Performed by: PHYSICIAN ASSISTANT

## 2022-07-07 PROCEDURE — 80048 BASIC METABOLIC PNL TOTAL CA: CPT | Performed by: PHYSICIAN ASSISTANT

## 2022-07-07 PROCEDURE — 97110 THERAPEUTIC EXERCISES: CPT

## 2022-07-07 RX ORDER — POTASSIUM CHLORIDE 20 MEQ/1
40 TABLET, EXTENDED RELEASE ORAL ONCE
Status: COMPLETED | OUTPATIENT
Start: 2022-07-07 | End: 2022-07-07

## 2022-07-07 RX ORDER — ENOXAPARIN SODIUM 100 MG/ML
40 INJECTION SUBCUTANEOUS DAILY
Qty: 11.2 ML | Refills: 0 | Status: SHIPPED | OUTPATIENT
Start: 2022-07-08 | End: 2022-10-12

## 2022-07-07 RX ORDER — OXYCODONE HYDROCHLORIDE 5 MG/1
10 TABLET ORAL EVERY 4 HOURS PRN
Qty: 60 TABLET | Refills: 0 | Status: SHIPPED | OUTPATIENT
Start: 2022-07-07 | End: 2022-07-08

## 2022-07-07 RX ORDER — BISACODYL 10 MG
10 SUPPOSITORY, RECTAL RECTAL DAILY PRN
Status: DISCONTINUED | OUTPATIENT
Start: 2022-07-07 | End: 2022-07-08

## 2022-07-07 RX ORDER — METHOCARBAMOL 500 MG/1
500 TABLET, FILM COATED ORAL 3 TIMES DAILY PRN
Status: DISCONTINUED | OUTPATIENT
Start: 2022-07-07 | End: 2022-07-13 | Stop reason: HOSPADM

## 2022-07-07 RX ORDER — DOCUSATE SODIUM 100 MG/1
100 CAPSULE, LIQUID FILLED ORAL 2 TIMES DAILY
Qty: 20 CAPSULE | Refills: 0 | Status: SHIPPED | OUTPATIENT
Start: 2022-07-07

## 2022-07-07 RX ADMIN — HYDROXYZINE HYDROCHLORIDE 50 MG: 25 TABLET ORAL at 21:15

## 2022-07-07 RX ADMIN — OXYCODONE HYDROCHLORIDE AND ACETAMINOPHEN 500 MG: 500 TABLET ORAL at 08:47

## 2022-07-07 RX ADMIN — ACETAMINOPHEN 325MG 975 MG: 325 TABLET ORAL at 21:04

## 2022-07-07 RX ADMIN — BUSPIRONE HYDROCHLORIDE 20 MG: 10 TABLET ORAL at 17:36

## 2022-07-07 RX ADMIN — OXYCODONE HYDROCHLORIDE 10 MG: 10 TABLET ORAL at 11:56

## 2022-07-07 RX ADMIN — POTASSIUM CHLORIDE 10 MEQ: 750 TABLET, EXTENDED RELEASE ORAL at 08:47

## 2022-07-07 RX ADMIN — DOCUSATE SODIUM 100 MG: 100 CAPSULE, LIQUID FILLED ORAL at 08:46

## 2022-07-07 RX ADMIN — LORAZEPAM 0.5 MG: 0.5 TABLET ORAL at 08:47

## 2022-07-07 RX ADMIN — PAROXETINE 60 MG: 20 TABLET, FILM COATED ORAL at 08:46

## 2022-07-07 RX ADMIN — BENZTROPINE MESYLATE 1 MG: 1 TABLET ORAL at 08:47

## 2022-07-07 RX ADMIN — ACETAMINOPHEN 325MG 975 MG: 325 TABLET ORAL at 07:20

## 2022-07-07 RX ADMIN — BENZTROPINE MESYLATE 1 MG: 1 TABLET ORAL at 17:36

## 2022-07-07 RX ADMIN — OXYCODONE HYDROCHLORIDE 10 MG: 10 TABLET ORAL at 07:24

## 2022-07-07 RX ADMIN — OXYCODONE HYDROCHLORIDE 10 MG: 10 TABLET ORAL at 03:25

## 2022-07-07 RX ADMIN — SENNOSIDES 8.6 MG: 8.6 TABLET ORAL at 21:04

## 2022-07-07 RX ADMIN — PROPRANOLOL HYDROCHLORIDE 10 MG: 20 TABLET ORAL at 11:53

## 2022-07-07 RX ADMIN — BUSPIRONE HYDROCHLORIDE 20 MG: 10 TABLET ORAL at 21:04

## 2022-07-07 RX ADMIN — LITHIUM CARBONATE 300 MG: 300 TABLET, FILM COATED, EXTENDED RELEASE ORAL at 21:05

## 2022-07-07 RX ADMIN — METHOCARBAMOL 500 MG: 500 TABLET ORAL at 14:24

## 2022-07-07 RX ADMIN — POTASSIUM CHLORIDE 40 MEQ: 1500 TABLET, EXTENDED RELEASE ORAL at 11:53

## 2022-07-07 RX ADMIN — FOLIC ACID 1000 MCG: 1 TABLET ORAL at 08:47

## 2022-07-07 RX ADMIN — ACETAMINOPHEN 325MG 975 MG: 325 TABLET ORAL at 14:24

## 2022-07-07 RX ADMIN — PANTOPRAZOLE SODIUM 20 MG: 20 TABLET, DELAYED RELEASE ORAL at 07:20

## 2022-07-07 RX ADMIN — ENOXAPARIN SODIUM 40 MG: 40 INJECTION SUBCUTANEOUS at 08:47

## 2022-07-07 RX ADMIN — METHOCARBAMOL 500 MG: 500 TABLET ORAL at 21:04

## 2022-07-07 RX ADMIN — ATORVASTATIN CALCIUM 40 MG: 40 TABLET, FILM COATED ORAL at 17:36

## 2022-07-07 RX ADMIN — VALBENAZINE 80 MG: 80 CAPSULE ORAL at 08:46

## 2022-07-07 RX ADMIN — DOCUSATE SODIUM 100 MG: 100 CAPSULE, LIQUID FILLED ORAL at 17:36

## 2022-07-07 RX ADMIN — OXYCODONE HYDROCHLORIDE 10 MG: 10 TABLET ORAL at 17:36

## 2022-07-07 RX ADMIN — QUETIAPINE FUMARATE 50 MG: 25 TABLET ORAL at 21:05

## 2022-07-07 RX ADMIN — BUSPIRONE HYDROCHLORIDE 20 MG: 10 TABLET ORAL at 08:46

## 2022-07-07 NOTE — ASSESSMENT & PLAN NOTE
With baseline hemoglobin fluctuating  · Hemoglobin today 8 8, hemoglobin 1 week ago 14 2  · Suspect multifactorial in the setting of recent surgical intervention, aggressive IV hydration  · No acute signs of bleeding at this time  · Continue Venofer, folic acid  · CBC in a m   · D/c IVF

## 2022-07-07 NOTE — PHYSICAL THERAPY NOTE
PT PROGRESS NOTE    Name: Jonh Mccann  AGE: 60 y o    MRN: [id number]  LENGTH OF STAY: 2    Admitting Dx:  Primary osteoarthritis of left knee [M17 12]      Patient Active Problem List   Diagnosis    Right knee pain    Chronic pain disorder    Lumbar radiculopathy    Lumbar spondylosis    Fibromyalgia    Lumbar disc disease with radiculopathy    Spinal stenosis of lumbar region with neurogenic claudication    Pain in joint, shoulder region    Intractable low back pain    Bilateral hip pain    Bipolar II disorder (HCC)    Cognitive disorder    Esophageal reflux    Fatigue    Female pelvic pain    Generalized anxiety disorder    Essential hypertension    History of hypokalemia    Insomnia    Major depressive disorder, recurrent episode, moderate degree (HCC)    Migraine    Opioid dependence (HCC)    Osteoarthritis of both hips    Osteoarthritis of knee    Overactive bladder    Left hip pain    Panic disorder without agoraphobia    Post traumatic stress disorder    Primary localized osteoarthritis of both knees    Recent unexplained weight loss    Sacroiliitis (HCC)    Tremor    Urinary incontinence    Vitamin D deficiency    Post laminectomy syndrome    Encounter for long-term use of opiate analgesic    Family history of colorectal cancer    Complaint related to dreams    MIMI (obstructive sleep apnea)    Tardive dyskinesia    Primary osteoarthritis of both knees    Patellofemoral disorder of both knees    Rash    Superficial bacterial infection of skin    Scar of back    Impaired skin integrity associated with surgical incision    Status post insertion of spinal cord stimulator    Ambulatory dysfunction    Dysphagia    Arthritis of right knee    Multiple closed fractures of metatarsal bone of right foot    Chronic back pain    Nausea    Encephalopathy    History of CVA (cerebrovascular accident)    Medical clearance for psychiatric admission    Mixed hyperlipidemia    Leukopenia    Preoperative evaluation to rule out surgical contraindication    CVA (cerebral vascular accident) ([facility] [address] )    S/P total knee arthroplasty, right    Constipation    Hypokalemia    Seizure-like activity ([facility] [address] )    Status post total knee replacement, left    Anemia               07/07/22 1410   PT Last Visit   PT Visit Date 07/07/22   Note Type   Note Type Treatment   Pain Assessment   Pain Score 10 - Worst Possible Pain   Pain Location/Orientation Orientation: Left; Location: Knee   Hospital Pain Intervention(s) Medication (See MAR); Cold applied;Repositioned; Ambulation/increased activity; Emotional support; Rest   Restrictions/Precautions   LLE Weight Bearing Per Order WBAT   Other Precautions Fall Risk;Pain;Cognitive; Chair Alarm; Bed Alarm   General   Chart Reviewed Yes   Response to Previous Treatment Patient reporting fatigue but able to participate  Family/Caregiver Present No   Cognition   Overall Cognitive Status Impaired   Arousal/Participation Alert   Attention Attends with cues to redirect   Orientation Level Oriented to person;Oriented to place;Oriented to time   Following Commands Follows one step commands without difficulty   Subjective   Subjective Pt reports severe L knee pain but willing to participate in therapy   Bed Mobility   Supine to Sit 4  Minimal assistance   Additional items Assist x 1;HOB elevated; Bedrails; Increased time required;Verbal cues;LE management   Sit to Supine 3  Moderate assistance   Additional items Assist x 1; Increased time required;Verbal cues;LE management   Additional Comments cues for techniques & safety   Transfers   Sit to Stand 3  Moderate assistance   Additional items Assist x 1; Increased time required;Verbal cues   Stand to Sit 4  Minimal assistance   Additional items Assist x 1; Increased time required;Verbal cues   Additional Comments cues for hand placement, LLE positioning & safe chair/toilet approach   Ambulation/Elevation   Gait pattern Antalgic; Forward Flexion; Wide CALLY; Decreased foot clearance;Decreased L stance;Shuffling; Short stride; Step to;Excessively slow   Gait Assistance 4  Minimal assist   Additional items Assist x 1;Verbal cues; Tactile cues   Assistive Device Rolling walker   Distance 2'x3 (bed to [facility] + BSC to bed + lateral steps to [facility])   Stair Management Assistance   (not appropriate at this time given dec overall activity tolerance, dec balance & amb 2* to severe L knee pain)   Ambulation/Elevation Additional Comments seated rest in between amb trials; unsteady gait; dec amb tolerance 2* to severe L knee pain; cues for gait sequencing & assist for RW mgt   Balance   Static Sitting Fair +   Dynamic Sitting Fair   Static Standing Poor +   Dynamic Standing Poor   Ambulatory Poor   Endurance Deficit   Endurance Deficit Yes   Endurance Deficit Description pain; weakness; fatigue   Activity Tolerance   Activity Tolerance Patient limited by fatigue;Patient limited by pain   Nurse Made Aware RN Fransisca   Exercises   TKR Supine;10 reps;AAROM; Left  (still unable to tolerate quad sets 2* to severe pain; poor quad strength)   Assessment   Prognosis Fair   Problem List Decreased strength;Decreased range of motion;Decreased endurance; Impaired balance;Decreased mobility; Decreased cognition; Impaired judgement;Decreased safety awareness;Pain   Assessment Pt seen for PT per POC  Pt demonstrate dec activity tolerance & requiring inc assistance for mobility compared to this morning session  Pt's overall mobility & activity tolerance limited by pain  Pt require min/modAx1 for most functional mobility  Pt still continue to require cues for overall mobility techniques & safety especially hand placement, LLE positioning & safe chair/toilet/bed approach as pt tends to  prematurely attempt to sit even when target surface still far  Please see flowsheet above for objective findings & levels of assistance required for all functional tasks during this tx session  Gait deviations as above but no gross LOB noted  Still dec amb tolerance 2* to severe LLE pain  Stair training not appropriate at this time given dec overall activity tolerance, dec balance & amb 2* to severe L knee pain  Pt tolerated above mentioned thera  ex fairly  Nsg staff most recent vital signs as follows: /78 (BP Location: Right arm)   Pulse 93   Temp 97 9 °F (36 6 °C) (Temporal)   Resp 18   Ht 5' (1 524 m)   Wt 80 7 kg (177 lb 14 6 oz)   SpO2 100%   Breastfeeding No   BMI 34 75 kg/m²   Will continue PT per POC  Pt still refusing to stay OOB in chair despite max encouragement  At end of session, pt back in bed in stable condition, call bell & phone in reach, bed alarm activated  Fall precautions reinforced w/ good understanding  The patient's AM-PAC Basic Mobility Inpatient Short Form Raw Score is 13  A Raw score of less than or equal to 16 suggests the patient may benefit from discharge to post-acute rehabilitation services  Please also refer to the recommendation of the Physical Therapist for safe discharge planning  From PT standpoint, will continue to recommend inpt rehab at D/C   to follow  Nsg staff to continue to mobilized pt (OOB in chair for all meals & ambulate in room/unit) as tolerated to prevent decline in function  Nsg notified  Barriers to Discharge Inaccessible home environment   Barriers to Discharge Comments stairs   Goals   Patient Goals to have less pain   STG Expiration Date 07/13/22   PT Treatment Day 3   Plan   Treatment/Interventions Functional transfer training;LE strengthening/ROM; Elevations; Therapeutic exercise; Endurance training;Patient/family training;Bed mobility;Gait training;Spoke to nursing;Spoke to case management   Progress Slow progress, decreased activity tolerance   PT Frequency Twice a day   Recommendation   PT Discharge Recommendation Post acute rehabilitation services   AM-PAC Basic Mobility Inpatient   Turning in Bed Without Bedrails 3   Lying on Back to Sitting on Edge of Flat Bed 3   Moving Bed to Chair 2   Standing Up From Chair 2   Walk in Room 2   Climb 3-5 Stairs 1   Basic Mobility Inpatient Raw Score 13   Basic Mobility Standardized Score 33 99   Highest Level Of Mobility   -HLM Goal 4: Move to chair/commode   -HLM Achieved 4: Move to chair/commode   Education   Education Provided Mobility training;Home exercise program;Assistive device   Patient Reinforcement needed   End of Consult   Patient Position at End of Consult Supine;Bed/Chair alarm activated; All needs within reach   End of Consult Comments Pt in stable condition at end of session  All needs in reach  Bed alarm activated     Astrid Lozano, PT

## 2022-07-07 NOTE — PROGRESS NOTES
[address] [place] [address]  Progress Note - Samantha Chacon 1965, 60 y o  female MRN: [id number]  Unit/Bed#: E2 -01 Encounter: [id number]  Primary Care Provider: Flora Clark DO   Date and time admitted to hospital: 7/5/2022  8:04 AM    * Status post total knee replacement, left  Assessment & Plan  Postop day 2  · DVT prophylaxis  · Pain control  · PT/OT recommending rehab however family members requesting home with home PT  · Management per primary    Anemia  Assessment & Plan  With baseline hemoglobin fluctuating  · Hemoglobin today 8 8, hemoglobin 1 week ago 14 2  · Suspect multifactorial in the setting of recent surgical intervention, aggressive IV hydration  · No acute signs of bleeding at this time  · Continue Venofer, folic acid  · CBC in a m   · D/c IVF    Mixed hyperlipidemia  Assessment & Plan  Continue Lipitor 40 mg daily    Tardive dyskinesia  Assessment & Plan  Continue Cogentin 1 mg b i d  Essential hypertension  Assessment & Plan  Continue propanolol 10 mg b i d , amlodipine 10 mg daily    Bipolar II disorder (HCC)  Assessment & Plan  Mood is stable, continue Seroquel 50 mg HS, lithium 300 mg HS, Paxil 60 mg daily, BuSpar 20 mg t i d , Valbenazine 80mg daily  · Atarax 50 mg t i d  p r n  VTE Pharmacologic Prophylaxis: VTE Score: 9 High Risk (Score >/= 5) - Pharmacological DVT Prophylaxis Ordered: enoxaparin (Lovenox)  Sequential Compression Devices Ordered  Patient Centered Rounds: I performed bedside rounds with nursing staff today  Discussions with Specialists or Other Care Team Provider:  None    Education and Discussions with Family / Patient: Per primary  Time Spent for Care: 15 minutes  More than 50% of total time spent on counseling and coordination of care as described above      Current Length of Stay: 2 day(s)  Current Patient Status: Inpatient   Certification Statement: The patient will continue to require additional inpatient hospital stay due to Per primary  Discharge Plan: Amandeep Ma is following this patient on consult  They are medically stable for discharge when deemed appropriate by primary service  Code Status: Level 1 - Full Code    Subjective:   Patient is seen resting in bed  She states that she is still having pain in her knee  She does not offer any additional complaints at this time  Objective:     Vitals:   Temp (24hrs), Av 1 °F (36 7 °C), Min:97 7 °F (36 5 °C), Max:98 9 °F (37 2 °C)    Temp:  [97 7 °F (36 5 °C)-98 9 °F (37 2 °C)] 97 9 °F (36 6 °C)  HR:  [] 90  Resp:  [18-19] 18  BP: (109-143)/(65-75) 120/67  SpO2:  [94 %-97 %] 95 %  Body mass index is 34 75 kg/m²  Input and Output Summary (last 24 hours): Intake/Output Summary (Last 24 hours) at 2022 1116  Last data filed at 2022 0340  Gross per 24 hour   Intake 240 ml   Output 875 ml   Net -635 ml       Physical Exam:   Physical Exam  Vitals and nursing note reviewed  Constitutional:       General: She is not in acute distress  Appearance: Normal appearance  She is not ill-appearing, toxic-appearing or diaphoretic  Eyes:      General: No scleral icterus  Conjunctiva/sclera: Conjunctivae normal    Cardiovascular:      Rate and Rhythm: Normal rate and regular rhythm  Heart sounds: No murmur heard  No friction rub  No gallop  Pulmonary:      Effort: Pulmonary effort is normal  No respiratory distress  Breath sounds: Normal breath sounds  No stridor  No wheezing, rhonchi or rales  Chest:      Chest wall: No tenderness  Abdominal:      General: Abdomen is flat  Bowel sounds are normal  There is no distension  Palpations: Abdomen is soft  Tenderness: There is no abdominal tenderness  Skin:     General: Skin is warm and dry  Coloration: Skin is not jaundiced or pale  Neurological:      General: No focal deficit present  Mental Status: She is alert and oriented to person, place, and time   Mental status is at baseline  Psychiatric:      Comments: Involuntary tongue movements          Additional Data:     Labs:  Results from last 7 days   Lab Units 07/07/22  0505   WBC Thousand/uL 7 32   HEMOGLOBIN g/dL 8 8*   HEMATOCRIT % 27 4*   PLATELETS Thousands/uL 254     Results from last 7 days   Lab Units 07/07/22  0505   SODIUM mmol/L 139   POTASSIUM mmol/L 3 2*   CHLORIDE mmol/L 102   CO2 mmol/L 26   BUN mg/dL 10   CREATININE mg/dL 0 75   ANION GAP mmol/L 11   CALCIUM mg/dL 7 8*   GLUCOSE RANDOM mg/dL 94                       Lines/Drains:  Invasive Devices  Report    Peripheral Intravenous Line  Duration           Peripheral IV 07/05/22 Dorsal (posterior); Left Hand 2 days                      Imaging: No pertinent imaging reviewed      Recent Cultures (last 7 days):         Last 24 Hours Medication List:   Current Facility-Administered Medications   Medication Dose Route Frequency Provider Last Rate    acetaminophen  975 mg Oral [facility] Mike Robles PA-C      amLODIPine  10 mg Oral Daily Mike Robles [place]      ascorbic acid  500 mg Oral Daily Mikeevan Robles [place]      atorvastatin  40 mg Oral Daily With Cycle MoneyJACQUI      benztropine  1 mg Oral BID Mike Robles PA-C      bisacodyl  10 mg Rectal Daily PRN Mike Singh PA-C      busPIRone  20 mg Oral TID Karla Del Cid PA-C      calcium carbonate  1,000 mg Oral Daily PRN Mike Singh PA-C      docusate sodium  100 mg Oral BID Mike Robles PA-C      enoxaparin  40 mg Subcutaneous Daily Mike Robles [place]      folic acid  9,773 mcg Oral Daily Mike Robles [place]      HYDROmorphone  0 5 mg Intravenous Q4H PRN Mike Singh PA-C      hydrOXYzine HCL  50 mg Oral TID PRN Karla Del Cid PA-C      lithium carbonate  300 mg Oral HS Mike Robles PA-C      LORazepam  0 5 mg Oral BID PRN Karla Del Cid PA-C      oxyCODONE  10 mg Oral Q4H PRN Mike Robles PA-C      oxyCODONE  5 mg Oral Q4H PRN Karla Del Cid PA-C      pantoprazole  20 mg Oral Early Morning Abby Fallon      PARoxetine  60 mg Oral Daily Abby Fallon      polyethylene glycol  17 g Oral Daily PRN Prasad Singh PA-C      potassium chloride  10 mEq Oral Daily Prasad Robles PA-C      potassium chloride  40 mEq Oral Once Abby Titus      propranolol  10 mg Oral BID before breakfast/lunch Alfredote JACQUI Mendez      QUEtiapine  50 mg Oral HS Margarite JACQUI Mendez      senna  1 tablet Oral HS Prasad Robles PA-C      Valbenazine Tosylate  80 mg Oral Daily [facility] [place]          Today, Patient Was Seen By: Shalini Romano PA-C    **Please Note: This note may have been constructed using a voice recognition system  **

## 2022-07-07 NOTE — PROGRESS NOTES
Progress Note - Orthopedics   Samantha QUINONEZ Niurka Lauren 60 y o  female MRN: [id number]  Unit/Bed#: E2 -01 Encounter: [id number]    Assessment:  60 y o  female s/p left total knee arthroplasty POD 2, ABLA    Plan:  · ABLA- postop  Vitals improved  Receiving IVF  Continue to monitor  · Pain control prn  · PT/OT- WBAT left LE   · Lovenox/TEDs/SCDs for DVT prophylaxis  Patient will be dc with lovenox x 4 weeks  · Dc planning- patient and family would like dc home with [facility]  PT aware  Subjective: Pt S&E  Patient states she is having severe pain in the knee  It is improved with her current pain regimen  She states she has not passed gas yet and does not have much appetite  She felt chills this morning  Denies distal numbness or tingling  Vitals: Blood pressure 120/67, pulse 90, temperature 97 9 °F (36 6 °C), temperature source Temporal, resp  rate 18, height 5' (1 524 m), weight 80 7 kg (177 lb 14 6 oz), SpO2 95 %, not currently breastfeeding  ,Body mass index is 34 75 kg/m²  Intake/Output Summary (Last 24 hours) at 7/7/2022 0835  Last data filed at 7/7/2022 0340  Gross per 24 hour   Intake 240 ml   Output 1225 ml   Net -985 ml       Invasive Devices  Report    Peripheral Intravenous Line  Duration           Peripheral IV 07/05/22 Dorsal (posterior); Left Hand 1 day                Ortho Exam: leftLE:  Drsg:  mepilex intact with small area of blood on distal dressing, sensation grossly intact L4, L5, S1, palpable pedal pulse, EHL/AT/GS intact    Lab, Imaging and other studies:   CBC:   Lab Results   Component Value Date    WBC 7 32 07/07/2022    HGB 8 8 (L) 07/07/2022    HCT 27 4 (L) 07/07/2022    MCV 91 07/07/2022     07/07/2022    MCH 29 1 07/07/2022    MCHC 32 1 07/07/2022    RDW 14 9 07/07/2022    MPV 9 1 07/07/2022     CMP:   Lab Results   Component Value Date    SODIUM 139 07/07/2022     07/07/2022    CO2 26 07/07/2022    BUN 10 07/07/2022    CREATININE 0 75 07/07/2022    CALCIUM 7 8 (L) 07/07/2022    EGFR 88 07/07/2022

## 2022-07-07 NOTE — PLAN OF CARE
Problem: Prexisting or High Potential for Compromised Skin Integrity  Goal: Skin integrity is maintained or improved  Description: INTERVENTIONS:  - Identify patients at risk for skin breakdown  - Assess and monitor skin integrity  - Assess and monitor nutrition and hydration status  - Monitor labs   - Assess for incontinence   - Turn and reposition patient  - Assist with mobility/ambulation  - Relieve pressure over bony prominences  - Avoid friction and shearing  - Provide appropriate hygiene as needed including keeping skin clean and dry  - Evaluate need for skin moisturizer/barrier cream  - Collaborate with interdisciplinary team   - Patient/family teaching  - Consider wound care consult   Outcome: Progressing     Problem: MOBILITY - ADULT  Goal: Maintain or return to baseline ADL function  Description: INTERVENTIONS:  -  Assess patient's ability to carry out ADLs; assess patient's baseline for ADL function and identify physical deficits which impact ability to perform ADLs (bathing, care of mouth/teeth, toileting, grooming, dressing, etc )  - Assess/evaluate cause of self-care deficits   - Assess range of motion  - Assess patient's mobility; develop plan if impaired  - Assess patient's need for assistive devices and provide as appropriate  - Encourage maximum independence but intervene and supervise when necessary  - Involve family in performance of ADLs  - Assess for home care needs following discharge   - Consider OT consult to assist with ADL evaluation and planning for discharge  - Provide patient education as appropriate  Outcome: Progressing     Problem: Potential for Falls  Goal: Patient will remain free of falls  Description: INTERVENTIONS:  - Educate patient/family on patient safety including physical limitations  - Instruct patient to call for assistance with activity   - Consult OT/PT to assist with strengthening/mobility   - Keep Call bell within reach  - Keep bed low and locked with side rails adjusted as appropriate  - Keep care items and personal belongings within reach  - Initiate and maintain comfort rounds  - Make Fall Risk Sign visible to staff  - Offer Toileting every 2 Hours, in advance of need  - Initiate/Maintain bed/chair alarm  - Obtain necessary fall risk management equipment: bed/chair alarm, call bell, gripper socks, bedside commode, walker, rounds  - Apply yellow socks and bracelet for high fall risk patients  - Consider moving patient to room near nurses station  Outcome: Progressing     Problem: PAIN - ADULT  Goal: Verbalizes/displays adequate comfort level or baseline comfort level  Description: Interventions:  - Encourage patient to monitor pain and request assistance  - Assess pain using appropriate pain scale  - Administer analgesics based on type and severity of pain and evaluate response  - Implement non-pharmacological measures as appropriate and evaluate response  - Consider cultural and social influences on pain and pain management  - Notify physician/advanced practitioner if interventions unsuccessful or patient reports new pain  Outcome: Progressing     Problem: DISCHARGE PLANNING  Goal: Discharge to home or other facility with appropriate resources  Description: INTERVENTIONS:  - Identify barriers to discharge w/patient and caregiver  - Arrange for needed discharge resources and transportation as appropriate  - Identify discharge learning needs (meds, wound care, etc )  - Arrange for interpretive services to assist at discharge as needed  - Refer to Case Management Department for coordinating discharge planning if the patient needs post-hospital services based on physician/advanced practitioner order or complex needs related to functional status, cognitive ability, or social support system  Outcome: Progressing     Problem: Knowledge Deficit  Goal: Patient/family/caregiver demonstrates understanding of disease process, treatment plan, medications, and discharge instructions  Description: Complete learning assessment and assess knowledge base    Interventions:  - Provide teaching at level of understanding  - Provide teaching via preferred learning methods  Outcome: Progressing     Problem: SAFETY ADULT  Goal: Patient will remain free of falls  Description: INTERVENTIONS:  - Educate patient/family on patient safety including physical limitations  - Instruct patient to call for assistance with activity   - Consult OT/PT to assist with strengthening/mobility   - Keep Call bell within reach  - Keep bed low and locked with side rails adjusted as appropriate  - Keep care items and personal belongings within reach  - Initiate and maintain comfort rounds  - Make Fall Risk Sign visible to staff  - Offer Toileting every 2 Hours, in advance of need  - Initiate/Maintain bed/chair alarm  - Obtain necessary fall risk management equipment: bed/chair alarm, call bell, gripper socks, bedside commode, walker, rounds  - Apply yellow socks and bracelet for high fall risk patients  - Consider moving patient to room near nurses station  Outcome: Progressing

## 2022-07-07 NOTE — ASSESSMENT & PLAN NOTE
Postop day 2  · DVT prophylaxis  · Pain control  · PT/OT recommending rehab however family members requesting home with home PT  · Management per primary

## 2022-07-07 NOTE — PHYSICAL THERAPY NOTE
PT PROGRESS NOTE    Name: Roseann Fiore  AGE: 60 y o    MRN: [id number]  LENGTH OF STAY: 2    Admitting Dx:  Primary osteoarthritis of left knee [M17 12]      Patient Active Problem List   Diagnosis    Right knee pain    Chronic pain disorder    Lumbar radiculopathy    Lumbar spondylosis    Fibromyalgia    Lumbar disc disease with radiculopathy    Spinal stenosis of lumbar region with neurogenic claudication    Pain in joint, shoulder region    Intractable low back pain    Bilateral hip pain    Bipolar II disorder (HCC)    Cognitive disorder    Esophageal reflux    Fatigue    Female pelvic pain    Generalized anxiety disorder    Essential hypertension    History of hypokalemia    Insomnia    Major depressive disorder, recurrent episode, moderate degree (HCC)    Migraine    Opioid dependence (HCC)    Osteoarthritis of both hips    Osteoarthritis of knee    Overactive bladder    Left hip pain    Panic disorder without agoraphobia    Post traumatic stress disorder    Primary localized osteoarthritis of both knees    Recent unexplained weight loss    Sacroiliitis (HCC)    Tremor    Urinary incontinence    Vitamin D deficiency    Post laminectomy syndrome    Encounter for long-term use of opiate analgesic    Family history of colorectal cancer    Complaint related to dreams    MIMI (obstructive sleep apnea)    Tardive dyskinesia    Primary osteoarthritis of both knees    Patellofemoral disorder of both knees    Rash    Superficial bacterial infection of skin    Scar of back    Impaired skin integrity associated with surgical incision    Status post insertion of spinal cord stimulator    Ambulatory dysfunction    Dysphagia    Arthritis of right knee    Multiple closed fractures of metatarsal bone of right foot    Chronic back pain    Nausea    Encephalopathy    History of CVA (cerebrovascular accident)    Medical clearance for psychiatric admission    Mixed hyperlipidemia    Leukopenia    Preoperative evaluation to rule out surgical contraindication    CVA (cerebral vascular accident) ([facility] )    S/P total knee arthroplasty, right    Constipation    Hypokalemia    Seizure-like activity ([facility] )    Status post total knee replacement, left    Anemia               07/07/22 0948   PT Last Visit   PT Visit Date 07/07/22   Note Type   Note Type Treatment   Pain Assessment   Pain Score 10 - Worst Possible Pain   Pain Location/Orientation Orientation: Left; Location: Knee   Hospital Pain Intervention(s) Medication (See MAR); Cold applied;Repositioned; Ambulation/increased activity; Emotional support; Rest   Restrictions/Precautions   LLE Weight Bearing Per Order WBAT   Other Precautions Fall Risk;Pain   General   Chart Reviewed Yes   Response to Previous Treatment Patient reporting fatigue but able to participate  Family/Caregiver Present No   Cognition   Overall Cognitive Status Impaired   Arousal/Participation Alert   Attention Attends with cues to redirect   Orientation Level Oriented to person;Oriented to place;Oriented to time   Following Commands Follows one step commands without difficulty   Subjective   Subjective Pt agreeable to therapy after encouragement  Bed Mobility   Supine to Sit 4  Minimal assistance   Additional items Assist x 1;HOB elevated; Bedrails; Increased time required;Verbal cues;LE management   Additional Comments cues for techniques & safety   Transfers   Sit to Stand 3  Moderate assistance   Additional items Assist x 1; Increased time required;Verbal cues   Stand to Sit 4  Minimal assistance   Additional items Assist x 1; Increased time required;Verbal cues   Toilet transfer 3  Moderate assistance   Additional items Assist x 1; Increased time required;Verbal cues;Standard toilet; Other  (grab bar)   Additional Comments pt continue to require cues for hand placement & LLE positioning; require encouragement to stay OOB in chair at end of session   Ambulation/Elevation   Gait pattern Antalgic; Forward Flexion; Wide CALLY;Decreased foot clearance;Decreased L stance; Short stride; Step to;Excessively slow   Gait Assistance 4  Minimal assist   Additional items Assist x 1;Verbal cues; Tactile cues   Assistive Device Rolling walker   Distance 3'x1 + 15'x2   Stair Management Assistance   (not appropriate at this time given dec amb tolerance & balance)   Ambulation/Elevation Additional Comments require seated rest in between amb trials; unsteady gait; require enocuragement to progress amb distance/tolerance   Balance   Static Sitting Fair +   Dynamic Sitting Fair   Static Standing Fair -   Dynamic Standing Poor +   Ambulatory Poor +   Endurance Deficit   Endurance Deficit Yes   Endurance Deficit Description pain; weakness; fatigue   Activity Tolerance   Activity Tolerance Patient limited by fatigue;Patient limited by pain   Nurse Made Aware RN Fransisca   Exercises   TKR Supine;10 reps;AAROM; Left  (unable to tolerate quad sets 2* to pain, attempted 2x w/ poor quad strength)   Assessment   Prognosis Fair   Problem List Decreased strength;Decreased range of motion;Decreased endurance; Impaired balance;Decreased mobility; Impaired judgement;Decreased safety awareness;Pain   Assessment Pt seen for PT per POC  Pt slowly making progress in PT  Pt progressed to minAx1 for bed mobility & amb w/ RW as well as improved amb tolerance compared to previous PT session however still below PLOF & not adequate for safe D/C home at this time  Pt still require modAx1 for transfers & require inc time as well as frequent rest periods to complete overall tasks 2* to pain, weakness & poor endurance  Gait still slow, unsteady & antalgic w/ dec foot clearance & strides but no gross LOB noted  Pt continue to require cues for gait sequencing & RW mgt as well as cues for safe chair/toilet/bed approach as pt tend to prematurely attempt to sit even when target surface still far   Please see flowsheet above for objective findings & levels of assistance required for all functional tasks during this tx session  Pt tolerated above mentioned thera  ex fairly except quad sets + require rest periods in between exercises 2* to pain  Still demonstrate limited L knee flexion  No SOB & dizziness reported t/o session  Nsg staff most recent vital signs as follows: /67 (BP Location: Right arm)   Pulse 90   Temp 97 9 °F (36 6 °C) (Temporal)   Resp 18   Ht 5' (1 524 m)   Wt 80 7 kg (177 lb 14 6 oz)   SpO2 95%   Breastfeeding No   BMI 34 75 kg/m²   Will continue PT per POC  At end of session, pt back in bed in stable condition, call bell & phone in reach, bed alarm activated  Pt declined to stay OOB in chair at end of session despite max encouragement  Fall precautions reinforced w/ good understanding  The patient's AM-PAC Basic Mobility Inpatient Short Form Raw Score is 15  A Raw score of less than or equal to 16 suggests the patient may benefit from discharge to post-acute rehabilitation services  Please also refer to the recommendation of the Physical Therapist for safe discharge planning  From PT standpoint, will continue to recommend inpt rehab at D/C   to follow  Nsg staff to continue to mobilized pt (OOB in chair for all meals & ambulate in room/unit) as tolerated to prevent decline in function  Nsg notified  Barriers to Discharge Inaccessible home environment   Barriers to Discharge Comments stairs   Goals   Patient Goals to have less pain   STG Expiration Date 07/13/22   PT Treatment Day 2   Plan   Treatment/Interventions Functional transfer training;LE strengthening/ROM; Elevations; Therapeutic exercise; Endurance training;Patient/family training;Bed mobility;Gait training;Spoke to nursing;Spoke to advanced practitioner   Progress Slow progress, decreased activity tolerance   PT Frequency Twice a day   Recommendation   PT Discharge Recommendation Post acute rehabilitation services   AM-PAC Basic Mobility Inpatient   Turning in Bed Without Bedrails 3   Lying on Back to Sitting on Edge of Flat Bed 3   Moving Bed to Chair 3   Standing Up From Chair 2   Walk in Room 3   Climb 3-5 Stairs 1   Basic Mobility Inpatient Raw Score 15   Basic Mobility Standardized Score 36 97   Highest Level Of Mobility   -HL Goal 4: Move to chair/commode   -HLM Achieved 6: Walk 10 steps or more   Education   Education Provided Mobility training;Home exercise program;Assistive device; Other  (TKR protocol especially LE positioning)   Patient Reinforcement needed   End of Consult   Patient Position at End of Consult Supine; All needs within reach;Bed/Chair alarm activated   End of Consult Comments Pt in stable condition  All needs in reach  Bed alarm activated     Serge Izaguirre, PT

## 2022-07-07 NOTE — CASE MANAGEMENT
Case Management Assessment & Discharge Planning Note    Patient name Sheryl Condon  Location [address] [place] [address] /E2 -* MRN [id number]  : 1965 Date 2022       Current Admission Date: 2022  Current Admission Diagnosis:Status post total knee replacement, left   Patient Active Problem List    Diagnosis Date Noted    Anemia 2022    Status post total knee replacement, left 2022    Seizure-[facility] activity ([facility] ) 2022    Hypokalemia 2022    Constipation 03/15/2022    S/P total knee arthroplasty, right 03/10/2022    CVA (cerebral vascular accident) ([facility] ) 2022    Preoperative evaluation to rule out surgical contraindication 2022    Leukopenia 2021    Mixed hyperlipidemia 2021    Medical clearance for psychiatric admission 2021    History of CVA (cerebrovascular accident) 2021    Encephalopathy 2021    Nausea 2021    Multiple closed fractures of metatarsal bone of right foot 2021    Chronic back pain 2021    Arthritis of right knee 10/06/2020    Dysphagia 2020    Ambulatory dysfunction 2020    Status post insertion of spinal cord stimulator 2020    Superficial bacterial infection of skin 2020    Scar of back 2020    Impaired skin integrity associated with surgical incision 2020    Rash 2020    Primary osteoarthritis of both knees 2020    Patellofemoral disorder of both knees 2020    Tardive dyskinesia 2020    MIMI (obstructive sleep apnea)     Complaint related to dreams 2020    Family history of colorectal cancer 2019    Encounter for long-term use of opiate analgesic 2019    Post laminectomy syndrome 10/07/2019    Lumbar disc disease with radiculopathy 2018    Spinal stenosis of lumbar region with neurogenic claudication 2018    Lumbar radiculopathy 2017    Bilateral hip pain 2017    Primary localized osteoarthritis of both knees 06/16/2017    Chronic pain disorder 02/28/2017    [facility] 02/28/2017    Sacroiliitis ([facility] ) 02/28/2017    Lumbar spondylosis 10/31/2016    Osteoarthritis of both hips 10/31/2016    Generalized anxiety disorder 10/26/2016    Major depressive disorder, recurrent episode, moderate degree ([facility] ) 09/08/2016    Right knee pain 06/06/2016    Recent unexplained weight loss 06/06/2016    Fatigue 10/26/2015    Bipolar II disorder ([facility] ) 06/18/2015    Panic disorder without agoraphobia 06/18/2015    Post traumatic stress disorder 06/18/2015    Left hip pain 07/25/2014    Intractable low back pain 06/16/2014    Tremor 06/12/2014    Migraine 05/27/2014    Esophageal reflux 05/01/2014    Cognitive disorder 04/18/2014    Female pelvic pain 10/30/2013    Pain in joint, shoulder region 10/28/2013    Overactive bladder 09/26/2013    Osteoarthritis of knee 02/20/2013    Insomnia 01/31/2013    History of hypokalemia 01/03/2013    Urinary incontinence 09/24/2012    Vitamin D deficiency 09/18/2012    Fibromyalgia 09/14/2012    Essential hypertension 09/14/2012      LOS (days): 2  Geometric Mean LOS (GMLOS) (days):   Days to GMLOS:     OBJECTIVE:    Risk of Unplanned Readmission Score: 40 72         Current admission status: Inpatient       Preferred Pharmacy:   [address] [place] [address], [address] [facility]  [address] [address]  Phone: [phone] Fax: [phone]    [facility]/pharmacy #[id number]- [address] [address]  Phone: [phone] Fax: [facility], [facility] [address]  [address] [address]  Phone: [phone] Fax: [phone] [address] [facility],[address], [address]  [facility] [address]  Phone: [phone] Fax: Lorenza Izquierdo [address], [address] [address]  Phone: [phone] Fax: [phone]    Primary Care Provider: Naeem Haas DO    Primary Insurance: [id number] [facility]  Secondary Insurance: Rhett Donis    ASSESSMENT:  [id number] GOSS Representative - Daughter   Primary Phone: [phone] (Home)               Advance Directives  Does patient have a [address]?: No  Was patient offered paperwork?: Yes (reports not being interested and stating family is aware of wishes)  Does patient currently have a Health Care decision maker?: Yes, please see Health Care Proxy section  Does patient have Advance Directives?: No  Was patient offered paperwork?: Yes (reports not being interested and stating family is aware of wishes)  Primary Contact: Ramses Grey (daughter) [phone]              Patient Information  Admitted from[de-identified] Home  Mental Status: Alert  During Assessment patient was accompanied by: Not accompanied during assessment  Assessment information provided by[de-identified] Patient, Daughter (daughter)  Primary Caregiver: Family  Caregiver's Name[de-identified] Son in law  Caregiver's Relationship to Patient[de-identified] Family Member  Support Systems: Son, Daughter, Family members  [place] of Residence: [address] [place] [address] do you live in?: [address] [place] [address] entry access options   Select all that apply : Stairs  Number of steps to enter home : One Flight  Do the steps have railings?: Yes  Type of Current Residence: Apartment  Floor Level: 2  Upon entering residence, is there a bedroom on the main floor (no further steps)?: Yes  Upon entering residence, is there a bathroom on the main floor (no further steps)?: Yes  In the last 12 months, was there a time when you were not able to pay the mortgage or rent on time?: No  In the last 12 months, how many places have you lived?: 1  In the last 12 months, was there a time when you did not have a steady place to sleep or slept in a shelter (including now)?: No  Homeless/housing insecurity resource given?: N/A  Living Arrangements: Lives w/ Daughter, Lives w/ Extended Family    Activities of Daily Living Prior to Admission  Functional Status: Assistance (Requires assistance from family and caregiver for most ALDs and IADLs when increase pain)  Completes ADLs independently?: No  Level of ADL dependence: Assistance (Requires assistance from family and caregiver for most ALDs and IADLs when increase pain)  Ambulates independently?: No  Level of ambulatory dependence: Assistance (Requires assistance from family and caregiver for most ALDs and IADLs when increase pain  Also uses AD)  Does patient use assisted devices?: Yes  Assisted Devices (DME) used:  Wheelchair, Walker, Bedside Commode  Does patient currently own DME?: Yes  What DME does the patient currently own?: Bedside Commode, Wheelchair, [employer]  Does patient have a history of Outpatient Therapy (PT/OT)?: Yes  Does the patient have a history of Short-Term Rehab?: Yes ([facility])  Does patient have a history of HHC?: Yes (does not remember agency)  Does patient currently have [facility]?: No         Patient Information Continued  Income Source: SSI/SSD  Does patient have prescription coverage?: Yes  Within the past 12 months, you worried that your food would run out before you got the money to buy more : Never true  Within the past 12 months, the food you bought just didn't last and you didn't have money to get more : Never true  Food insecurity resource given?: N/A  Does patient receive dialysis treatments?: No  Does patient have a history of substance abuse?: No  Does patient have a history of Mental Health Diagnosis?: Yes  Is patient receiving treatment for mental health?: Yes  Has patient received inpatient treatment related to mental health in the last 2 years?: Yes [facility] July 2021-August 2021) Means of Transportation  Means of Transport to Appts[de-identified] Family transport  In the past 12 months, has lack of transportation kept you from medical appointments or from getting medications?: No  In the past 12 months, has lack of transportation kept you from meetings, work, or from getting things needed for daily living?: No  Was application for public transport provided?: N/A        DISCHARGE DETAILS:    Discharge planning discussed with[de-identified] Patient and daughter  Freedom of Choice: Yes  Comments - Freedom of Choice: Pt would like to go to STR- blanket referral will be placed after conversation with PA regarding exception  CM contacted family/caregiver?: Yes  Were Treatment Team discharge recommendations reviewed with patient/caregiver?: Yes          Contacts  Patient Contacts: Cyrie  Relationship to Patient[de-identified] Family  Contact Method: Phone  Phone Number: [phone]  Reason/Outcome: Discharge Planning              Other Referral/Resources/Interventions Provided:  Interventions: Short Term Rehab         Treatment Team Recommendation: Short Term Rehab                                            Additional Comments: CM met with pt at the bedside  Pt reports that she lives at home with her daughter, CLARENCE, and grandchildren  She reports having good supports amongst her family and reports them taking good care of her  Pt's CLARENCE is her paid caregiver  She does use a walker and wheelchair to get around and family reports that she needs assistance with most ADLs and IADLs though she does try to do things on her own  Pt would like to go to rehab and family is in agreement with whatever she would like to do  Plan is to talk with the PA tomorrow to see if she can be an exception and not need full optioning  Once determined, blanket referral will be placed for STR to see who is willing to take  CM did let both pt and family know placement might be difficult with history and unvaccinated status  Reported understanding   CM will continue to follow
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
## 2022-07-07 NOTE — PLAN OF CARE
Problem: PHYSICAL THERAPY ADULT  Goal: Performs mobility at highest level of function for planned discharge setting  See evaluation for individualized goals  Description: Treatment/Interventions: Functional transfer training, LE strengthening/ROM, Elevations, Therapeutic exercise, Endurance training, Patient/family training, Bed mobility, Gait training, Spoke to nursing, OT          See flowsheet documentation for full assessment, interventions and recommendations  7/7/2022 1511 by Luther Galeano PT  Note: Prognosis: Fair  Problem List: Decreased strength, Decreased range of motion, Decreased endurance, Impaired balance, Decreased mobility, Decreased cognition, Impaired judgement, Decreased safety awareness, Pain  Assessment: Pt seen for PT per POC  Pt demonstrate dec activity tolerance & requiring inc assistance for mobility compared to this morning session  Pt's overall mobility & activity tolerance limited by pain  Pt require min/modAx1 for most functional mobility  Pt still continue to require cues for overall mobility techniques & safety especially hand placement, LLE positioning & safe chair/toilet/bed approach as pt tends to  prematurely attempt to sit even when target surface still far  Please see flowsheet above for objective findings & levels of assistance required for all functional tasks during this tx session  Gait deviations as above but no gross LOB noted  Still dec amb tolerance 2* to severe LLE pain  Stair training not appropriate at this time given dec overall activity tolerance, dec balance & amb 2* to severe L knee pain  Pt tolerated above mentioned thera  ex fairly  Nsg staff most recent vital signs as follows: /78 (BP Location: Right arm)   Pulse 93   Temp 97 9 °F (36 6 °C) (Temporal)   Resp 18   Ht 5' (1 524 m)   Wt 80 7 kg (177 lb 14 6 oz)   SpO2 100%   Breastfeeding No   BMI 34 75 kg/m²   Will continue PT per POC   Pt still refusing to stay OOB in chair despite max encouragement  At end of session, pt back in bed in stable condition, call bell & phone in reach, bed alarm activated  Fall precautions reinforced w/ good understanding  The patient's AM-PAC Basic Mobility Inpatient Short Form Raw Score is 13  A Raw score of less than or equal to 16 suggests the patient may benefit from discharge to post-acute rehabilitation services  Please also refer to the recommendation of the Physical Therapist for safe discharge planning  From PT standpoint, will continue to recommend inpt rehab at D/C  CM to follow  Nsg staff to continue to mobilized pt (OOB in chair for all meals & ambulate in room/unit) as tolerated to prevent decline in function  Nsg notified  Barriers to Discharge: Inaccessible home environment  Barriers to Discharge Comments: stairs     PT Discharge Recommendation: Post acute rehabilitation services          See flowsheet documentation for full assessment

## 2022-07-07 NOTE — PLAN OF CARE
Problem: PHYSICAL THERAPY ADULT  Goal: Performs mobility at highest level of function for planned discharge setting  See evaluation for individualized goals  Description: Treatment/Interventions: Functional transfer training, LE strengthening/ROM, Elevations, Therapeutic exercise, Endurance training, Patient/family training, Bed mobility, Gait training, Spoke to nursing, OT          See flowsheet documentation for full assessment, interventions and recommendations  Outcome: Progressing  Note: Prognosis: Fair  Problem List: Decreased strength, Decreased range of motion, Decreased endurance, Impaired balance, Decreased mobility, Impaired judgement, Decreased safety awareness, Pain  Assessment: Pt seen for PT per POC  Pt slowly making progress in PT  Pt progressed to minAx1 for bed mobility & amb w/ RW as well as improved amb tolerance compared to previous PT session however still below PLOF & not adequate for safe D/C home at this time  Pt still require modAx1 for transfers & require inc time as well as frequent rest periods to complete overall tasks 2* to pain, weakness & poor endurance  Gait still slow, unsteady & antalgic w/ dec foot clearance & strides but no gross LOB noted  Pt continue to require cues for gait sequencing & RW mgt as well as cues for safe chair/toilet/bed approach as pt tend to prematurely attempt to sit even when target surface still far  Please see flowsheet above for objective findings & levels of assistance required for all functional tasks during this tx session  Pt tolerated above mentioned thera  ex fairly except quad sets + require rest periods in between exercises 2* to pain  Still demonstrate limited L knee flexion  No SOB & dizziness reported t/o session   Nsg staff most recent vital signs as follows: /67 (BP Location: Right arm)   Pulse 90   Temp 97 9 °F (36 6 °C) (Temporal)   Resp 18   Ht 5' (1 524 m)   Wt 80 7 kg (177 lb 14 6 oz)   SpO2 95%   Breastfeeding No   BMI 34 75 kg/m²   Will continue PT per POC  At end of session, pt back in bed in stable condition, call bell & phone in reach, bed alarm activated  Pt declined to stay OOB in chair at end of session despite max encouragement  Fall precautions reinforced w/ good understanding  The patient's AM-PAC Basic Mobility Inpatient Short Form Raw Score is 15  A Raw score of less than or equal to 16 suggests the patient may benefit from discharge to post-acute rehabilitation services  Please also refer to the recommendation of the Physical Therapist for safe discharge planning  From PT standpoint, will continue to recommend inpt rehab at D/C  CM to follow  Nsg staff to continue to mobilized pt (OOB in chair for all meals & ambulate in room/unit) as tolerated to prevent decline in function  Nsg notified  Barriers to Discharge: Inaccessible home environment  Barriers to Discharge Comments: stairs     PT Discharge Recommendation: Post acute rehabilitation services          See flowsheet documentation for full assessment

## 2022-07-08 LAB
ANION GAP SERPL CALCULATED.3IONS-SCNC: 8 MMOL/L (ref 4–13)
BUN SERPL-MCNC: 5 MG/DL (ref 5–25)
CALCIUM SERPL-MCNC: 8.1 MG/DL (ref 8.3–10.1)
CHLORIDE SERPL-SCNC: 104 MMOL/L (ref 100–108)
CO2 SERPL-SCNC: 30 MMOL/L (ref 21–32)
CREAT SERPL-MCNC: 0.66 MG/DL (ref 0.6–1.3)
ERYTHROCYTE [DISTWIDTH] IN BLOOD BY AUTOMATED COUNT: 14.8 % (ref 11.6–15.1)
GFR SERPL CREATININE-BSD FRML MDRD: 97 ML/MIN/1.73SQ M
GLUCOSE SERPL-MCNC: 102 MG/DL (ref 65–140)
GLUCOSE SERPL-MCNC: 96 MG/DL (ref 65–140)
HCT VFR BLD AUTO: 26.3 % (ref 34.8–46.1)
HGB BLD-MCNC: 8.4 G/DL (ref 11.5–15.4)
MCH RBC QN AUTO: 29.6 PG (ref 26.8–34.3)
MCHC RBC AUTO-ENTMCNC: 31.9 G/DL (ref 31.4–37.4)
MCV RBC AUTO: 93 FL (ref 82–98)
PLATELET # BLD AUTO: 285 THOUSANDS/UL (ref 149–390)
PMV BLD AUTO: 9.5 FL (ref 8.9–12.7)
POTASSIUM SERPL-SCNC: 3.7 MMOL/L (ref 3.5–5.3)
RBC # BLD AUTO: 2.84 MILLION/UL (ref 3.81–5.12)
SODIUM SERPL-SCNC: 142 MMOL/L (ref 136–145)
WBC # BLD AUTO: 8.03 THOUSAND/UL (ref 4.31–10.16)

## 2022-07-08 PROCEDURE — 97116 GAIT TRAINING THERAPY: CPT

## 2022-07-08 PROCEDURE — 99232 SBSQ HOSP IP/OBS MODERATE 35: CPT | Performed by: PHYSICIAN ASSISTANT

## 2022-07-08 PROCEDURE — 80048 BASIC METABOLIC PNL TOTAL CA: CPT | Performed by: PHYSICIAN ASSISTANT

## 2022-07-08 PROCEDURE — 85027 COMPLETE CBC AUTOMATED: CPT | Performed by: PHYSICIAN ASSISTANT

## 2022-07-08 PROCEDURE — 82948 REAGENT STRIP/BLOOD GLUCOSE: CPT

## 2022-07-08 PROCEDURE — 99024 POSTOP FOLLOW-UP VISIT: CPT | Performed by: ORTHOPAEDIC SURGERY

## 2022-07-08 PROCEDURE — 97110 THERAPEUTIC EXERCISES: CPT

## 2022-07-08 PROCEDURE — 97530 THERAPEUTIC ACTIVITIES: CPT

## 2022-07-08 RX ORDER — BISACODYL 10 MG
10 SUPPOSITORY, RECTAL RECTAL DAILY PRN
Status: DISCONTINUED | OUTPATIENT
Start: 2022-07-08 | End: 2022-07-13 | Stop reason: HOSPADM

## 2022-07-08 RX ADMIN — DOCUSATE SODIUM 100 MG: 100 CAPSULE, LIQUID FILLED ORAL at 09:40

## 2022-07-08 RX ADMIN — OXYCODONE HYDROCHLORIDE 10 MG: 10 TABLET ORAL at 22:03

## 2022-07-08 RX ADMIN — BUSPIRONE HYDROCHLORIDE 20 MG: 10 TABLET ORAL at 09:47

## 2022-07-08 RX ADMIN — METHOCARBAMOL 500 MG: 500 TABLET ORAL at 12:20

## 2022-07-08 RX ADMIN — QUETIAPINE FUMARATE 50 MG: 25 TABLET ORAL at 22:02

## 2022-07-08 RX ADMIN — LITHIUM CARBONATE 300 MG: 300 TABLET, FILM COATED, EXTENDED RELEASE ORAL at 22:02

## 2022-07-08 RX ADMIN — BUSPIRONE HYDROCHLORIDE 20 MG: 10 TABLET ORAL at 22:02

## 2022-07-08 RX ADMIN — PROPRANOLOL HYDROCHLORIDE 10 MG: 20 TABLET ORAL at 07:37

## 2022-07-08 RX ADMIN — ATORVASTATIN CALCIUM 40 MG: 40 TABLET, FILM COATED ORAL at 16:52

## 2022-07-08 RX ADMIN — BENZTROPINE MESYLATE 1 MG: 1 TABLET ORAL at 09:39

## 2022-07-08 RX ADMIN — BENZTROPINE MESYLATE 1 MG: 1 TABLET ORAL at 17:53

## 2022-07-08 RX ADMIN — AMLODIPINE BESYLATE 10 MG: 10 TABLET ORAL at 09:39

## 2022-07-08 RX ADMIN — VALBENAZINE 80 MG: 80 CAPSULE ORAL at 09:46

## 2022-07-08 RX ADMIN — ACETAMINOPHEN 325MG 975 MG: 325 TABLET ORAL at 15:32

## 2022-07-08 RX ADMIN — ENOXAPARIN SODIUM 40 MG: 40 INJECTION SUBCUTANEOUS at 09:45

## 2022-07-08 RX ADMIN — ACETAMINOPHEN 325MG 975 MG: 325 TABLET ORAL at 22:03

## 2022-07-08 RX ADMIN — OXYCODONE HYDROCHLORIDE AND ACETAMINOPHEN 500 MG: 500 TABLET ORAL at 09:38

## 2022-07-08 RX ADMIN — OXYCODONE HYDROCHLORIDE 10 MG: 10 TABLET ORAL at 16:52

## 2022-07-08 RX ADMIN — POTASSIUM CHLORIDE 10 MEQ: 750 TABLET, EXTENDED RELEASE ORAL at 09:39

## 2022-07-08 RX ADMIN — DOCUSATE SODIUM 100 MG: 100 CAPSULE, LIQUID FILLED ORAL at 17:52

## 2022-07-08 RX ADMIN — PROPRANOLOL HYDROCHLORIDE 10 MG: 20 TABLET ORAL at 12:21

## 2022-07-08 RX ADMIN — SENNOSIDES 8.6 MG: 8.6 TABLET ORAL at 22:03

## 2022-07-08 RX ADMIN — OXYCODONE HYDROCHLORIDE 10 MG: 10 TABLET ORAL at 07:40

## 2022-07-08 RX ADMIN — ACETAMINOPHEN 325MG 975 MG: 325 TABLET ORAL at 05:51

## 2022-07-08 RX ADMIN — OXYCODONE HYDROCHLORIDE 10 MG: 10 TABLET ORAL at 12:20

## 2022-07-08 RX ADMIN — OXYCODONE HYDROCHLORIDE 10 MG: 10 TABLET ORAL at 03:33

## 2022-07-08 RX ADMIN — METHOCARBAMOL 500 MG: 500 TABLET ORAL at 05:51

## 2022-07-08 RX ADMIN — FOLIC ACID 1000 MCG: 1 TABLET ORAL at 09:38

## 2022-07-08 RX ADMIN — PANTOPRAZOLE SODIUM 20 MG: 20 TABLET, DELAYED RELEASE ORAL at 05:51

## 2022-07-08 RX ADMIN — BUSPIRONE HYDROCHLORIDE 20 MG: 10 TABLET ORAL at 16:54

## 2022-07-08 RX ADMIN — PAROXETINE 60 MG: 20 TABLET, FILM COATED ORAL at 09:40

## 2022-07-08 RX ADMIN — LORAZEPAM 0.5 MG: 0.5 TABLET ORAL at 17:53

## 2022-07-08 NOTE — PROGRESS NOTES
Progress Note - Orthopedics   Samantha CRISTIANO Pierce 60 y o  female MRN: [id number]  Unit/Bed#: E2 -01 Encounter: [id number]    Assessment:  60 y o  female s/p left total knee arthroplasty POD 3, ABLA    Plan:  · ABLA- postop  Hgb stable and vitals improved  Continue to monitor  · Pain control prn  · PT/OT- WBAT left LE   · Lovenox/TEDs/SCDs for DVT prophylaxis  Patient will be dc with lovenox x 4 weeks  · Dc planning- patient now requesting rehab, will require discussion with family and case management  Subjective: Pt S&E  Patient complains of severe left knee pain  States she would like to go to rehab because she cannot do stairs  She has not discussed this with her family yet  Denies distal numbness or tingling  Vitals: Blood pressure 142/93, pulse 90, temperature 97 5 °F (36 4 °C), temperature source Temporal, resp  rate 17, height 5' (1 524 m), weight 80 7 kg (177 lb 14 6 oz), SpO2 100 %, not currently breastfeeding  ,Body mass index is 34 75 kg/m²  Intake/Output Summary (Last 24 hours) at 7/8/2022 0814  Last data filed at 7/8/2022 0503  Gross per 24 hour   Intake 760 ml   Output 800 ml   Net -40 ml       Invasive Devices  Report    Peripheral Intravenous Line  Duration           Peripheral IV 07/05/22 Dorsal (posterior); Left Hand 2 days                Ortho Exam: leftLE:  Drsg:  mepilex intact with small area of blood on distal dressing, sensation grossly intact L4, L5, S1, palpable pedal pulse, EHL/AT/GS intact    Lab, Imaging and other studies:   CBC:   Lab Results   Component Value Date    WBC 8 03 07/08/2022    HGB 8 4 (L) 07/08/2022    HCT 26 3 (L) 07/08/2022    MCV 93 07/08/2022     07/08/2022    MCH 29 6 07/08/2022    MCHC 31 9 07/08/2022    RDW 14 8 07/08/2022    MPV 9 5 07/08/2022     CMP:   Lab Results   Component Value Date    SODIUM 142 07/08/2022     07/08/2022    CO2 30 07/08/2022    BUN 5 07/08/2022    CREATININE 0 66 07/08/2022    CALCIUM 8 1 (L) 07/08/2022    EGFR 97 07/08/2022

## 2022-07-08 NOTE — PHYSICAL THERAPY NOTE
PT PROGRESS NOTE    Name: Albino Adrian  AGE: 60 y o    MRN: [id number]  LENGTH OF STAY: 3    Admitting Dx:  Primary osteoarthritis of left knee [M17 12]      Patient Active Problem List   Diagnosis    Right knee pain    Chronic pain disorder    Lumbar radiculopathy    Lumbar spondylosis    Fibromyalgia    Lumbar disc disease with radiculopathy    Spinal stenosis of lumbar region with neurogenic claudication    Pain in joint, shoulder region    Intractable low back pain    Bilateral hip pain    Bipolar II disorder (HCC)    Cognitive disorder    Esophageal reflux    Fatigue    Female pelvic pain    Generalized anxiety disorder    Essential hypertension    History of hypokalemia    Insomnia    Major depressive disorder, recurrent episode, moderate degree (HCC)    Migraine    Opioid dependence (HCC)    Osteoarthritis of both hips    Osteoarthritis of knee    Overactive bladder    Left hip pain    Panic disorder without agoraphobia    Post traumatic stress disorder    Primary localized osteoarthritis of both knees    Recent unexplained weight loss    Sacroiliitis (HCC)    Tremor    Urinary incontinence    Vitamin D deficiency    Post laminectomy syndrome    Encounter for long-term use of opiate analgesic    Family history of colorectal cancer    Complaint related to dreams    MIMI (obstructive sleep apnea)    Tardive dyskinesia    Primary osteoarthritis of both knees    Patellofemoral disorder of both knees    Rash    Superficial bacterial infection of skin    Scar of back    Impaired skin integrity associated with surgical incision    Status post insertion of spinal cord stimulator    Ambulatory dysfunction    Dysphagia    Arthritis of right knee    Multiple closed fractures of metatarsal bone of right foot    Chronic back pain    Nausea    Encephalopathy    History of CVA (cerebrovascular accident)    Medical clearance for psychiatric admission    Mixed hyperlipidemia    Leukopenia    Preoperative evaluation to rule out surgical contraindication    CVA (cerebral vascular accident) ([facility] [place] [address] )    S/P total knee arthroplasty, right    Constipation    Hypokalemia    Seizure-like activity ([facility] [place] [address] )    Status post total knee replacement, left    Anemia               07/08/22 1044   PT Last Visit   PT Visit Date 07/08/22   Note Type   Note Type Treatment   Pain Assessment   Pain Score 8   Pain Location/Orientation Orientation: Left; Location: Knee   Hospital Pain Intervention(s) Medication (See MAR); Repositioned; Ambulation/increased activity; Emotional support; Rest   Restrictions/Precautions   LLE Weight Bearing Per Order WBAT   Other Precautions Fall Risk;Pain; Chair Alarm; Bed Alarm   General   Chart Reviewed Yes   Response to Previous Treatment Patient reporting fatigue but able to participate  Family/Caregiver Present No   Cognition   Overall Cognitive Status Impaired   Arousal/Participation Alert; Cooperative   Attention Attends with cues to redirect   Orientation Level Oriented to person;Oriented to place;Oriented to time   Following Commands Follows one step commands without difficulty   Comments pt cooperative   Subjective   Subjective Pt agreeable to therapy  Bed Mobility   Supine to Sit 4  Minimal assistance   Additional items Assist x 1;HOB elevated; Bedrails; Increased time required;Verbal cues;LE management   Sit to Supine Unable to assess  (pt OOB in chair at end of session)   Additional Comments cues for techniques & safety   Transfers   Sit to Stand 3  Moderate assistance   Additional items Assist x 1; Increased time required;Verbal cues   Stand to Sit 4  Minimal assistance   Additional items Assist x 1; Increased time required;Verbal cues   Toilet transfer 3  Moderate assistance   Additional items Assist x 1; Increased time required;Verbal cues;Standard toilet; Other  (grab bar)   Additional Comments cues for hand placement & LLE positioning   Ambulation/Elevation   Gait pattern Antalgic; Forward Flexion; Wide CALLY; Decreased foot clearance;Decreased L stance; Short stride; Step to;Excessively slow   Gait Assistance 4  Minimal assist   Additional items Assist x 1;Verbal cues; Tactile cues   Assistive Device Rolling walker   Distance 15'x2   Ambulation/Elevation Additional Comments seated rest in between amb trials 2* to inc pain; unsteady gait but no gross LOB noted; continue to require cues for gait sequencing   Balance   Static Sitting Fair +   Dynamic Sitting Fair   Static Standing Fair -   Dynamic Standing Poor +   Ambulatory Poor +   Endurance Deficit   Endurance Deficit Yes   Endurance Deficit Description pain; weakness; fatigue   Activity Tolerance   Activity Tolerance Patient limited by fatigue;Patient limited by pain   Nurse Made Aware MARIA Watts Oar   Exercises   TKR Supine;10 reps;AAROM; Bilateral   Assessment   Prognosis Fair   Problem List Decreased strength;Decreased range of motion;Decreased endurance; Impaired balance;Decreased mobility; Impaired judgement;Decreased safety awareness;Pain   Assessment Pt seen for PT per POC  Improved amb tolerance this tx session however still require modAx1 for sit to stand transitions & minAx1 for most functional mobility including amb w/ RW  Pt continue to require cues for overall mobility techniques & safety especially hand placement, LLE positioning & RW mgt/safety  Amb tolerance improved however still below baseline  Will trial stair training next tx session as appropriate  Gait deviations as above but no gross LOB noted  Pt tolerated above mentioned thera  ex well  Please see flowsheet above for objective findings & levels of assistance required for all functional tasks during this tx session  Nsg staff most recent vital signs as follows: /73 (BP Location: Right arm)   Pulse 77   Temp 98 5 °F (36 9 °C) (Temporal)   Resp 18   Ht 5' (1 524 m)   Wt 80 7 kg (177 lb 14 6 oz)   SpO2 100%   Breastfeeding No   BMI 34 75 kg/m²   Will continue PT per POC   At end of session, pt OOB in chair in stable condition, call bell & phone in reach, chair alarm activated  Fall precautions reinforced w/ good understanding  The patient's AM-PAC Basic Mobility Inpatient Short Form Raw Score is 14  A Raw score of less than or equal to 16 suggests the patient may benefit from discharge to post-acute rehabilitation services  Please also refer to the recommendation of the Physical Therapist for safe discharge planning  From PT standpoint, will continue to recommend inpt rehab at D/C  CM to follow  Nsg staff to continue to mobilized pt (OOB in chair for all meals & ambulate in room) as tolerated to prevent decline in function  Nsg notified  Barriers to Discharge Inaccessible home environment   Barriers to Discharge Comments stairs   Goals   Patient Goals to get better   STG Expiration Date 07/13/22   PT Treatment Day 4   Plan   Treatment/Interventions Functional transfer training;LE strengthening/ROM; Elevations; Therapeutic exercise; Endurance training;Patient/family training;Bed mobility;Gait training;Spoke to nursing;Spoke to case management   Progress Slow progress, decreased activity tolerance   PT Frequency Twice a day   Recommendation   PT Discharge Recommendation Post acute rehabilitation services   AM-PAC Basic Mobility Inpatient   Turning in Bed Without Bedrails 3   Lying on Back to Sitting on Edge of Flat Bed 3   Moving Bed to Chair 3   Standing Up From Chair 2   Walk in Room 2   Climb 3-5 Stairs 1   Basic Mobility Inpatient Raw Score 14   Basic Mobility Standardized Score 35 55   Highest Level Of Mobility   JH-HLM Goal 4: Move to chair/commode   JH-HLM Achieved 6: Walk 10 steps or more   Education   Education Provided Mobility training;Home exercise program;Assistive device   Patient Reinforcement needed   End of Consult   Patient Position at End of Consult Bedside chair;Bed/Chair alarm activated; All needs within reach   End of Consult Comments Pt in stable condition at end of session  All needs in reach   Chair alarm activated     Serge Izaguirre, PT

## 2022-07-08 NOTE — ASSESSMENT & PLAN NOTE
Postop day 3  · Continue DVT prophylaxis with Lovenox   · Pain control per ortho  · PT/OT recommending rehab, initially family requesting home with home health but now agreeable to rehab per patient   · Likely will only require <30 days of rehab prior to returning home  · Management per primary

## 2022-07-08 NOTE — PLAN OF CARE
Problem: PHYSICAL THERAPY ADULT  Goal: Performs mobility at highest level of function for planned discharge setting  See evaluation for individualized goals  Description: Treatment/Interventions: Functional transfer training, LE strengthening/ROM, Elevations, Therapeutic exercise, Endurance training, Patient/family training, Bed mobility, Gait training, Spoke to nursing, OT          See flowsheet documentation for full assessment, interventions and recommendations  Outcome: Progressing  Note: Prognosis: Fair  Problem List: Decreased strength, Decreased range of motion, Decreased endurance, Impaired balance, Decreased mobility, Impaired judgement, Decreased safety awareness, Pain  Assessment: Pt seen for PT per POC  Improved amb tolerance this tx session however still require modAx1 for sit to stand transitions & minAx1 for most functional mobility including amb w/ RW  Pt continue to require cues for overall mobility techniques & safety especially hand placement, LLE positioning & RW mgt/safety  Amb tolerance improved however still below baseline  Will trial stair training next tx session as appropriate  Gait deviations as above but no gross LOB noted  Pt tolerated above mentioned thera  ex well  Please see flowsheet above for objective findings & levels of assistance required for all functional tasks during this tx session  Nsg staff most recent vital signs as follows: /73 (BP Location: Right arm)   Pulse 77   Temp 98 5 °F (36 9 °C) (Temporal)   Resp 18   Ht 5' (1 524 m)   Wt 80 7 kg (177 lb 14 6 oz)   SpO2 100%   Breastfeeding No   BMI 34 75 kg/m²   Will continue PT per POC  At end of session, pt OOB in chair in stable condition, call bell & phone in reach, chair alarm activated  Fall precautions reinforced w/ good understanding  The patient's AM-PAC Basic Mobility Inpatient Short Form Raw Score is 14   A Raw score of less than or equal to 16 suggests the patient may benefit from discharge to post-acute rehabilitation services  Please also refer to the recommendation of the Physical Therapist for safe discharge planning  From PT standpoint, will continue to recommend inpt rehab at D/C  CM to follow  Nsg staff to continue to mobilized pt (OOB in chair for all meals & ambulate in room) as tolerated to prevent decline in function  Nsg notified  Barriers to Discharge: Inaccessible home environment  Barriers to Discharge Comments: stairs     PT Discharge Recommendation: Post acute rehabilitation services          See flowsheet documentation for full assessment

## 2022-07-08 NOTE — PLAN OF CARE
Problem: PHYSICAL THERAPY ADULT  Goal: Performs mobility at highest level of function for planned discharge setting  See evaluation for individualized goals  Description: Treatment/Interventions: Functional transfer training, LE strengthening/ROM, Elevations, Therapeutic exercise, Endurance training, Patient/family training, Bed mobility, Gait training, Spoke to nursing, OT          See flowsheet documentation for full assessment, interventions and recommendations  7/8/2022 1659 by Leelee Collins, PT  Outcome: Progressing  Note: Prognosis: Fair  Problem List: Decreased strength, Decreased range of motion, Decreased endurance, Impaired balance, Decreased mobility, Impaired judgement, Decreased safety awareness, Pain  Assessment: Pt seen for PT per POC  Pt slowly improving in PT  Pt continue to require minAx1 for most functional mobility except sit to stand still require modAx1  Pt continue to require cues for overall mobility techniques & safety especially hand placement, LLE positioning, gait sequencing & RW safety  Gait deviations as above, slow, unsteady & antalgic but no gross LOB noted  Pt tolerated above mentioned thera  ex fairly, AAROM  Pt continue require rest periods t/o session 2* to pain & fatigue  Stair training still not appropriate at this time 2* to severe pain, impaired balance & dec amb tolerance  Please see flowsheet above for objective findings & levels of assistance required for all functional tasks during this tx session  No SOB & dizziness reported t/o session  Nsg staff most recent vital signs as follows: /73 (BP Location: Right arm)   Pulse 77   Temp 98 5 °F (36 9 °C) (Temporal)   Resp 18   Ht 5' (1 524 m)   Wt 80 7 kg (177 lb 14 6 oz)   SpO2 100%   Breastfeeding No   BMI 34 75 kg/m²   Will continue PT per POC  At end of session, pt OOB in chair in stable condition, call bell & phone in reach, chair alarm activated  Fall precautions reinforced w/ good understanding   The patient's AM-PAC Basic Mobility Inpatient Short Form Raw Score is 14  A Raw score of less than or equal to 16 suggests the patient may benefit from discharge to post-acute rehabilitation services  Please also refer to the recommendation of the Physical Therapist for safe discharge planning  From PT standpoint, will continue to recommend inpt rehab at D/C  CM to follow  Nsg staff to continue to mobilized pt (OOB in chair for all meals & ambulate in room/unit) as tolerated to prevent decline in function  Nsg notified  Barriers to Discharge: Inaccessible home environment  Barriers to Discharge Comments: stairs     PT Discharge Recommendation: Post acute rehabilitation services          See flowsheet documentation for full assessment

## 2022-07-08 NOTE — PROGRESS NOTES
[address] [place] [address]  Progress Note - Samantha Hampton 1965, 60 y o  female MRN: [id number]  Unit/Bed#: E2 -01 Encounter: [id number]  Primary Care Provider: Roly Quezada DO   Date and time admitted to hospital: 7/5/2022  8:04 AM    * Status post total knee replacement, left  Assessment & Plan  Postop day 3  · Continue DVT prophylaxis with Lovenox   · Pain control per ortho  · PT/OT recommending rehab, initially family requesting home with home health but now agreeable to rehab per patient   · Likely will only require <30 days of rehab prior to returning home  · Management per primary    Anemia  Assessment & Plan  With baseline hemoglobin fluctuating  · Hemoglobin today 8 4, hemoglobin 1 week ago 14 2  · Suspect multifactorial in the setting of recent surgical intervention, aggressive IV hydration  · No acute signs of bleeding at this time  · Continue Venofer, folic acid  · Has remained relatively stable since yesterday     Mixed hyperlipidemia  Assessment & Plan  · Continue Lipitor 40 mg daily    Tardive dyskinesia  Assessment & Plan  · Continue Cogentin 1 mg b i d  Essential hypertension  Assessment & Plan  · BP controlled inpatient  · Continue propanolol 10 mg b i d , amlodipine 10 mg daily    Bipolar II disorder (HCC)  Assessment & Plan  Mood is stable, continue Seroquel 50 mg HS, lithium 300 mg HS, Paxil 60 mg daily, BuSpar 20 mg t i d , Valbenazine 80mg daily  · Atarax 50 mg t i d  p r n  Patient medically clear for discharge from internal medicine perspective  SLIM will sign off, please call with questions  VTE Pharmacologic Prophylaxis: VTE Score: 9 High Risk (Score >/= 5) - Pharmacological DVT Prophylaxis Ordered: enoxaparin (Lovenox)  Sequential Compression Devices Ordered  Patient Centered Rounds: I performed bedside rounds with nursing staff today    Discussions with Specialists or Other Care Team Provider: none    Education and Discussions with Family / Patient: spoke with patient, family update per primary team       Time Spent for Care: 30 minutes  More than 50% of total time spent on counseling and coordination of care as described above  Current Length of Stay: 3 day(s)  Current Patient Status: Inpatient   Certification Statement: per primary team  Discharge Plan: [facility] is following this patient on consult  They are medically stable for discharge when deemed appropriate by primary service  Code Status: Level 1 - Full Code    Subjective:   Patient notes she is doing okay today  Having 8/10 pain in her knee  Now asking for rehab placement, notes she spoke with her daughter who is agreeable  Denies any SOB or CP  Objective:     Vitals:   Temp (24hrs), Av 8 °F (36 6 °C), Min:97 5 °F (36 4 °C), Max:97 9 °F (36 6 °C)    Temp:  [97 5 °F (36 4 °C)-97 9 °F (36 6 °C)] 97 5 °F (36 4 °C)  HR:  [75-93] 90  Resp:  [16-18] 17  BP: (115-157)/(67-93) 142/93  SpO2:  [95 %-100 %] 100 %  Body mass index is 34 75 kg/m²  Input and Output Summary (last 24 hours): Intake/Output Summary (Last 24 hours) at 2022 0815  Last data filed at 2022 0503  Gross per 24 hour   Intake 760 ml   Output 800 ml   Net -40 ml       Physical Exam:   Physical Exam  Vitals reviewed  Constitutional:       General: She is not in acute distress  HENT:      Head: Normocephalic and atraumatic  Eyes:      General: No scleral icterus  Conjunctiva/sclera: Conjunctivae normal    Cardiovascular:      Rate and Rhythm: Normal rate and regular rhythm  Heart sounds: No murmur heard  Pulmonary:      Effort: Pulmonary effort is normal  No respiratory distress  Breath sounds: Normal breath sounds  Abdominal:      General: Bowel sounds are normal  There is no distension  Palpations: Abdomen is soft  Tenderness: There is no abdominal tenderness  Musculoskeletal:      Cervical back: Neck supple  Right lower leg: No edema  Left lower leg: No edema  Skin:     General: Skin is warm and dry  Neurological:      Mental Status: She is oriented to person, place, and time  Comments: Involuntary tongue movements   Psychiatric:         Mood and Affect: Mood normal          Behavior: Behavior normal           Additional Data:     Labs:  Results from last 7 days   Lab Units 07/08/22  0503   WBC Thousand/uL 8 03   HEMOGLOBIN g/dL 8 4*   HEMATOCRIT % 26 3*   PLATELETS Thousands/uL 285     Results from last 7 days   Lab Units 07/08/22  0503   SODIUM mmol/L 142   POTASSIUM mmol/L 3 7   CHLORIDE mmol/L 104   CO2 mmol/L 30   BUN mg/dL 5   CREATININE mg/dL 0 66   ANION GAP mmol/L 8   CALCIUM mg/dL 8 1*   GLUCOSE RANDOM mg/dL 96         Results from last 7 days   Lab Units 07/08/22  0635   POC GLUCOSE mg/dl 102               Lines/Drains:  Invasive Devices  Report    Peripheral Intravenous Line  Duration           Peripheral IV 07/05/22 Dorsal (posterior); Left Hand 2 days                      Imaging: No pertinent imaging reviewed      Recent Cultures (last 7 days):         Last 24 Hours Medication List:   Current Facility-Administered Medications   Medication Dose Route Frequency Provider Last Rate    acetaminophen  975 mg Oral [facility] JACQUI Robles      amLODIPine  10 mg Oral Daily [place], [place]      ascorbic acid  500 mg Oral Daily [place], [place]      atorvastatin  40 mg Oral Daily With [employer]      benztropine  1 mg Oral BID [facility] JACQUI Robles      bisacodyl  10 mg Rectal Daily PRN [place] [facility] JACQUI Singh      busPIRone  20 mg Oral TID Patricio Oreilly PA-C      calcium carbonate  1,000 mg Oral Daily PRN [facility] JACQUI Singh      docusate sodium  100 mg Oral BID [facility] JACQUI Robles      enoxaparin  40 mg Subcutaneous Daily [place], [place]      folic acid  4,374 mcg Oral Daily [place], [place]      HYDROmorphone  0 5 mg Intravenous Q4H PRN [place] PA-C      hydrOXYzine HCL  50 mg Oral TID PRN [facility] Paul Lara, JACQUI      lithium carbonate  300 mg Oral HS [facility]      LORazepam  0 5 mg Oral BID PRN [facility], JACQUI      methocarbamol  500 mg Oral TID PRN [facility], JACQUI      oxyCODONE  10 mg Oral Q4H PRN [facility]QUI      oxyCODONE  5 mg Oral Q4H [facility]N [facility]      pantoprazole  20 mg Oral Early Morning [place], [place]      PARoxetine  60 mg Oral Daily [place], [place]      polyethylene glycol  17 g Oral Daily PRN [facility], JACQUI      potassium chloride  10 mEq Oral Daily Tomma Burow RoblesJACQUI      propranolol  10 mg Oral BID before breakfast/lunch [facility]      QUEtiapine  50 mg Oral HS [facility], JACQUI      senna  1 tablet Oral HS [facility], JACQUI      Valbenazine Tosylate  80 mg Oral Daily [place], [place]          Today, Patient Was Seen By: Edilia Pascual PA-C    **Please Note: This note may have been constructed using a voice recognition system  **

## 2022-07-08 NOTE — PLAN OF CARE
Problem: Prexisting or High Potential for Compromised Skin Integrity  Goal: Skin integrity is maintained or improved  Description: INTERVENTIONS:  - Identify patients at risk for skin breakdown  - Assess and monitor skin integrity  - Assess and monitor nutrition and hydration status  - Monitor labs   - Assess for incontinence   - Turn and reposition patient  - Assist with mobility/ambulation  - Relieve pressure over bony prominences  - Avoid friction and shearing  - Provide appropriate hygiene as needed including keeping skin clean and dry  - Evaluate need for skin moisturizer/barrier cream  - Collaborate with interdisciplinary team   - Patient/family teaching  - Consider wound care consult   Outcome: Progressing     Problem: MOBILITY - ADULT  Goal: Maintain or return to baseline ADL function  Description: INTERVENTIONS:  -  Assess patient's ability to carry out ADLs; assess patient's baseline for ADL function and identify physical deficits which impact ability to perform ADLs (bathing, care of mouth/teeth, toileting, grooming, dressing, etc )  - Assess/evaluate cause of self-care deficits   - Assess range of motion  - Assess patient's mobility; develop plan if impaired  - Assess patient's need for assistive devices and provide as appropriate  - Encourage maximum independence but intervene and supervise when necessary  - Involve family in performance of ADLs  - Assess for home care needs following discharge   - Consider OT consult to assist with ADL evaluation and planning for discharge  - Provide patient education as appropriate  Outcome: Progressing  Goal: Maintains/Returns to pre admission functional level  Description: INTERVENTIONS:  - Perform BMAT or MOVE assessment daily    - Set and communicate daily mobility goal to care team and patient/family/caregiver  - Collaborate with rehabilitation services on mobility goals if consulted  - Perform Range of Motion 3 times a day    - Reposition patient every 2 hours   - Dangle patient 3 times a day  - Stand patient 3 times a day  - Ambulate patient 3 times a day  - Out of bed to chair 3 times a day   - Out of bed for meals 3 times a day  - Out of bed for toileting  - Record patient progress and toleration of activity level   Outcome: Progressing     Problem: Potential for Falls  Goal: Patient will remain free of falls  Description: INTERVENTIONS:  - Educate patient/family on patient safety including physical limitations  - Instruct patient to call for assistance with activity   - Consult OT/PT to assist with strengthening/mobility   - Keep Call bell within reach  - Keep bed low and locked with side rails adjusted as appropriate  - Keep care items and personal belongings within reach  - Initiate and maintain comfort rounds  - Make Fall Risk Sign visible to staff  - Offer Toileting every 2 Hours, in advance of need  - Initiate/Maintain bed/chair alarm  - Obtain necessary fall risk management equipment: bed/chair alarm, call bell, gripper socks, bedside commode, walker, rounds  - Apply yellow socks and bracelet for high fall risk patients  - Consider moving patient to room near nurses station  Outcome: Progressing     Problem: PAIN - ADULT  Goal: Verbalizes/displays adequate comfort level or baseline comfort level  Description: Interventions:  - Encourage patient to monitor pain and request assistance  - Assess pain using appropriate pain scale  - Administer analgesics based on type and severity of pain and evaluate response  - Implement non-pharmacological measures as appropriate and evaluate response  - Consider cultural and social influences on pain and pain management  - Notify physician/advanced practitioner if interventions unsuccessful or patient reports new pain  Outcome: Progressing     Problem: DISCHARGE PLANNING  Goal: Discharge to home or other facility with appropriate resources  Description: INTERVENTIONS:  - Identify barriers to discharge w/patient and caregiver  - Arrange for needed discharge resources and transportation as appropriate  - Identify discharge learning needs (meds, wound care, etc )  - Arrange for interpretive services to assist at discharge as needed  - Refer to Case Management Department for coordinating discharge planning if the patient needs post-hospital services based on physician/advanced practitioner order or complex needs related to functional status, cognitive ability, or social support system  Outcome: Progressing     Problem: Knowledge Deficit  Goal: Patient/family/caregiver demonstrates understanding of disease process, treatment plan, medications, and discharge instructions  Description: Complete learning assessment and assess knowledge base    Interventions:  - Provide teaching at level of understanding  - Provide teaching via preferred learning methods  Outcome: Progressing     Problem: GENITOURINARY - ADULT  Goal: Maintains or returns to baseline urinary function  Description: INTERVENTIONS:  - After amos catheter is discontineud, assess urinary function  - Encourage oral fluids to ensure adequate hydration if ordered  - Administer IV fluids as ordered to ensure adequate hydration  - Administer ordered medications as needed  - Offer frequent toileting  - Follow urinary retention protocol if ordered  Outcome: Progressing  Goal: Absence of urinary retention  Description: INTERVENTIONS:  - Assess patient's ability to void and empty bladder  - Monitor I/O  - Bladder scan as needed  - Follow urine retention protocol if ordered  Outcome: Progressing     Problem: SKIN/TISSUE INTEGRITY - ADULT  Goal: Incision(s), wounds(s) or drain site(s) healing without S/S of infection  Description: INTERVENTIONS  - Assess and document dressing, incision, wound bed, drain sites and surrounding tissue  - Provide patient and family education  - Perform skin care/dressing changes as ordered  Outcome: Progressing     Problem: SAFETY ADULT  Goal: Patient will remain free of falls  Description: INTERVENTIONS:  - Educate patient/family on patient safety including physical limitations  - Instruct patient to call for assistance with activity   - Consult OT/PT to assist with strengthening/mobility   - Keep Call bell within reach  - Keep bed low and locked with side rails adjusted as appropriate  - Keep care items and personal belongings within reach  - Initiate and maintain comfort rounds  - Make Fall Risk Sign visible to staff  - Offer Toileting every 2 Hours, in advance of need  - Initiate/Maintain bed/chair alarm  - Obtain necessary fall risk management equipment: bed/chair alarm, call bell, gripper socks, bedside commode, walker, rounds  - Apply yellow socks and bracelet for high fall risk patients  - Consider moving patient to room near nurses station  Outcome: Progressing

## 2022-07-08 NOTE — PHYSICAL THERAPY NOTE
PT PROGRESS NOTE    Name: Ronak Doshi  AGE: 60 y o    MRN: [id number]  LENGTH OF STAY: 3    Admitting Dx:  Primary osteoarthritis of left knee [M17 12]      Patient Active Problem List   Diagnosis    Right knee pain    Chronic pain disorder    Lumbar radiculopathy    Lumbar spondylosis    Fibromyalgia    Lumbar disc disease with radiculopathy    Spinal stenosis of lumbar region with neurogenic claudication    Pain in joint, shoulder region    Intractable low back pain    Bilateral hip pain    Bipolar II disorder (HCC)    Cognitive disorder    Esophageal reflux    Fatigue    Female pelvic pain    Generalized anxiety disorder    Essential hypertension    History of hypokalemia    Insomnia    Major depressive disorder, recurrent episode, moderate degree (HCC)    Migraine    Opioid dependence (HCC)    Osteoarthritis of both hips    Osteoarthritis of knee    Overactive bladder    Left hip pain    Panic disorder without agoraphobia    Post traumatic stress disorder    Primary localized osteoarthritis of both knees    Recent unexplained weight loss    Sacroiliitis (HCC)    Tremor    Urinary incontinence    Vitamin D deficiency    Post laminectomy syndrome    Encounter for long-term use of opiate analgesic    Family history of colorectal cancer    Complaint related to dreams    MIMI (obstructive sleep apnea)    Tardive dyskinesia    Primary osteoarthritis of both knees    Patellofemoral disorder of both knees    Rash    Superficial bacterial infection of skin    Scar of back    Impaired skin integrity associated with surgical incision    Status post insertion of spinal cord stimulator    Ambulatory dysfunction    Dysphagia    Arthritis of right knee    Multiple closed fractures of metatarsal bone of right foot    Chronic back pain    Nausea    Encephalopathy    History of CVA (cerebrovascular accident)    Medical clearance for psychiatric admission    Mixed hyperlipidemia    Leukopenia    Preoperative evaluation to rule out surgical contraindication    CVA (cerebral vascular accident) ([facility] [address] )    S/P total knee arthroplasty, right    Constipation    Hypokalemia    Seizure-like activity ([facility] [address] )    Status post total knee replacement, left    Anemia               07/08/22 1650   PT Last Visit   PT Visit Date 07/08/22   Note Type   Note Type Treatment   Pain Assessment   Pain Score 8   Pain Location/Orientation Orientation: Left; Location: Knee   Hospital Pain Intervention(s) Medication (See MAR); Repositioned; Ambulation/increased activity; Emotional support; Rest   Restrictions/Precautions   LLE Weight Bearing Per Order WBAT   Other Precautions Chair Alarm; Bed Alarm; Fall Risk;Pain   General   Chart Reviewed Yes   Response to Previous Treatment Patient reporting fatigue but able to participate  Family/Caregiver Present No   Cognition   Overall Cognitive Status Impaired   Arousal/Participation Alert; Cooperative   Attention Attends with cues to redirect   Orientation Level Oriented to person;Oriented to place;Oriented to time   Following Commands Follows one step commands without difficulty   Comments pt pleasant & cooperative   Subjective   Subjective Pt agreeable to therapy  Bed Mobility   Supine to Sit 4  Minimal assistance   Additional items Assist x 1;HOB elevated; Bedrails; Increased time required;Verbal cues;LE management   Additional Comments cues for techniques & safety   Transfers   Sit to Stand 3  Moderate assistance   Additional items Assist x 1; Increased time required;Verbal cues   Stand to Sit 4  Minimal assistance   Additional items Assist x 1; Armrests; Increased time required;Verbal cues   Toilet transfer 3  Moderate assistance   Additional items Assist x 1; Increased time required;Verbal cues; Commode   Additional Comments cues for hand placement & LLE positioning as well as safe chair/BSC approach as pt tend to rush or prematurely sit   Ambulation/Elevation   Gait pattern Antalgic; Forward Flexion; Wide CALLY; Decreased foot clearance;Decreased L stance;Shuffling; Short stride; Step to;Excessively slow   Gait Assistance 4  Minimal assist   Additional items Assist x 1;Verbal cues; Tactile cues   Assistive Device Rolling walker   Distance 15'x2 + 3'x2   Stair Management Assistance Not tested  (not appropriate at this time)   Ambulation/Elevation Additional Comments seated rest in between amb trials; unsteady gait but no gross LOB noted   Balance   Static Sitting Fair +   Dynamic Sitting Fair   Static Standing Fair -   Dynamic Standing Poor +   Ambulatory Poor +   Endurance Deficit   Endurance Deficit Yes   Endurance Deficit Description pain; weakness; fatigue   Activity Tolerance   Activity Tolerance Patient limited by fatigue;Patient limited by pain   Nurse Made Aware MARIA Parr   Exercises   TKR Supine;10 reps;AAROM; Bilateral   Assessment   Prognosis Fair   Problem List Decreased strength;Decreased range of motion;Decreased endurance; Impaired balance;Decreased mobility; Impaired judgement;Decreased safety awareness;Pain   Assessment Pt seen for PT per POC  Pt slowly improving in PT  Pt continue to require minAx1 for most functional mobility except sit to stand still require modAx1  Pt continue to require cues for overall mobility techniques & safety especially hand placement, LLE positioning, gait sequencing & RW safety  Gait deviations as above, slow, unsteady & antalgic but no gross LOB noted  Pt tolerated above mentioned thera  ex fairly, AAROM  Pt continue require rest periods t/o session 2* to pain & fatigue  Stair training still not appropriate at this time 2* to severe pain, impaired balance & dec amb tolerance  Please see flowsheet above for objective findings & levels of assistance required for all functional tasks during this tx session  No SOB & dizziness reported t/o session   Nsg staff most recent vital signs as follows: /73 (BP Location: Right arm)   Pulse 77   Temp 98 5 °F (36 9 °C) (Temporal)   Resp 18   Ht 5' (1 524 m)   Wt 80 7 kg (177 lb 14 6 oz)   SpO2 100%   Breastfeeding No   BMI 34 75 kg/m²   Will continue PT per POC  At end of session, pt OOB in chair in stable condition, call bell & phone in reach, chair alarm activated  Fall precautions reinforced w/ good understanding  The patient's AM-PAC Basic Mobility Inpatient Short Form Raw Score is 14  A Raw score of less than or equal to 16 suggests the patient may benefit from discharge to post-acute rehabilitation services  Please also refer to the recommendation of the Physical Therapist for safe discharge planning  From PT standpoint, will continue to recommend inpt rehab at D/C  CM to follow  Nsg staff to continue to mobilized pt (OOB in chair for all meals & ambulate in room/unit) as tolerated to prevent decline in function  Nsg notified  Barriers to Discharge Inaccessible home environment   Barriers to Discharge Comments stairs   Goals   Patient Goals to go to rehab   STG Expiration Date 07/13/22   PT Treatment Day 5   Plan   Treatment/Interventions Functional transfer training;LE strengthening/ROM; Elevations; Therapeutic exercise; Endurance training;Patient/family training;Bed mobility;Gait training;Spoke to nursing   Progress Slow progress, decreased activity tolerance   PT Frequency Twice a day   Recommendation   PT Discharge Recommendation Post acute rehabilitation services   AM-PAC Basic Mobility Inpatient   Turning in Bed Without Bedrails 3   Lying on Back to Sitting on Edge of Flat Bed 3   Moving Bed to Chair 3   Standing Up From Chair 2   Walk in Room 2   Climb 3-5 Stairs 1   Basic Mobility Inpatient Raw Score 14   Basic Mobility Standardized Score 35 55   Highest Level Of Mobility   JH-HLM Goal 4: Move to chair/commode   JH-HLM Achieved 6: Walk 10 steps or more   Education   Education Provided Mobility training;Home exercise program;Assistive device   Patient Reinforcement needed   End of Consult   Patient Position at End of Consult Bedside chair;Bed/Chair alarm activated; All needs within reach   End of Consult Comments Pt in stable condition at end of session  All needs in reach  Chair alarm activated     Leandra Canela, PT

## 2022-07-08 NOTE — ASSESSMENT & PLAN NOTE
With baseline hemoglobin fluctuating  · Hemoglobin today 8 4, hemoglobin 1 week ago 14 2  · Suspect multifactorial in the setting of recent surgical intervention, aggressive IV hydration  · No acute signs of bleeding at this time  · Continue Venofer, folic acid  · Has remained relatively stable since yesterday

## 2022-07-09 LAB
BASOPHILS # BLD AUTO: 0.03 THOUSANDS/ΜL (ref 0–0.1)
BASOPHILS NFR BLD AUTO: 0 % (ref 0–1)
EOSINOPHIL # BLD AUTO: 0.52 THOUSAND/ΜL (ref 0–0.61)
EOSINOPHIL NFR BLD AUTO: 6 % (ref 0–6)
ERYTHROCYTE [DISTWIDTH] IN BLOOD BY AUTOMATED COUNT: 15.4 % (ref 11.6–15.1)
HCT VFR BLD AUTO: 27.1 % (ref 34.8–46.1)
HGB BLD-MCNC: 8.5 G/DL (ref 11.5–15.4)
IMM GRANULOCYTES # BLD AUTO: 0.05 THOUSAND/UL (ref 0–0.2)
IMM GRANULOCYTES NFR BLD AUTO: 1 % (ref 0–2)
LYMPHOCYTES # BLD AUTO: 1.28 THOUSANDS/ΜL (ref 0.6–4.47)
LYMPHOCYTES NFR BLD AUTO: 16 % (ref 14–44)
MCH RBC QN AUTO: 29.3 PG (ref 26.8–34.3)
MCHC RBC AUTO-ENTMCNC: 31.4 G/DL (ref 31.4–37.4)
MCV RBC AUTO: 93 FL (ref 82–98)
MONOCYTES # BLD AUTO: 0.61 THOUSAND/ΜL (ref 0.17–1.22)
MONOCYTES NFR BLD AUTO: 8 % (ref 4–12)
NEUTROPHILS # BLD AUTO: 5.64 THOUSANDS/ΜL (ref 1.85–7.62)
NEUTS SEG NFR BLD AUTO: 69 % (ref 43–75)
NRBC BLD AUTO-RTO: 0 /100 WBCS
PLATELET # BLD AUTO: 289 THOUSANDS/UL (ref 149–390)
PMV BLD AUTO: 9 FL (ref 8.9–12.7)
RBC # BLD AUTO: 2.9 MILLION/UL (ref 3.81–5.12)
WBC # BLD AUTO: 8.13 THOUSAND/UL (ref 4.31–10.16)

## 2022-07-09 PROCEDURE — 85025 COMPLETE CBC W/AUTO DIFF WBC: CPT | Performed by: ORTHOPAEDIC SURGERY

## 2022-07-09 PROCEDURE — 99024 POSTOP FOLLOW-UP VISIT: CPT | Performed by: ORTHOPAEDIC SURGERY

## 2022-07-09 RX ADMIN — BENZTROPINE MESYLATE 1 MG: 1 TABLET ORAL at 10:04

## 2022-07-09 RX ADMIN — OXYCODONE HYDROCHLORIDE 10 MG: 10 TABLET ORAL at 10:03

## 2022-07-09 RX ADMIN — OXYCODONE HYDROCHLORIDE AND ACETAMINOPHEN 500 MG: 500 TABLET ORAL at 10:04

## 2022-07-09 RX ADMIN — FOLIC ACID 1000 MCG: 1 TABLET ORAL at 10:03

## 2022-07-09 RX ADMIN — VALBENAZINE 80 MG: 80 CAPSULE ORAL at 10:05

## 2022-07-09 RX ADMIN — PANTOPRAZOLE SODIUM 20 MG: 20 TABLET, DELAYED RELEASE ORAL at 05:43

## 2022-07-09 RX ADMIN — PAROXETINE 60 MG: 20 TABLET, FILM COATED ORAL at 10:03

## 2022-07-09 RX ADMIN — OXYCODONE HYDROCHLORIDE 10 MG: 10 TABLET ORAL at 19:54

## 2022-07-09 RX ADMIN — ENOXAPARIN SODIUM 40 MG: 40 INJECTION SUBCUTANEOUS at 10:03

## 2022-07-09 RX ADMIN — BUSPIRONE HYDROCHLORIDE 20 MG: 10 TABLET ORAL at 21:08

## 2022-07-09 RX ADMIN — ATORVASTATIN CALCIUM 40 MG: 40 TABLET, FILM COATED ORAL at 17:09

## 2022-07-09 RX ADMIN — BENZTROPINE MESYLATE 1 MG: 1 TABLET ORAL at 17:05

## 2022-07-09 RX ADMIN — DOCUSATE SODIUM 100 MG: 100 CAPSULE, LIQUID FILLED ORAL at 17:05

## 2022-07-09 RX ADMIN — BUSPIRONE HYDROCHLORIDE 20 MG: 10 TABLET ORAL at 10:03

## 2022-07-09 RX ADMIN — POTASSIUM CHLORIDE 10 MEQ: 750 TABLET, EXTENDED RELEASE ORAL at 10:03

## 2022-07-09 RX ADMIN — ACETAMINOPHEN 325MG 975 MG: 325 TABLET ORAL at 13:10

## 2022-07-09 RX ADMIN — OXYCODONE HYDROCHLORIDE 10 MG: 10 TABLET ORAL at 14:23

## 2022-07-09 RX ADMIN — OXYCODONE HYDROCHLORIDE 10 MG: 10 TABLET ORAL at 05:43

## 2022-07-09 RX ADMIN — ACETAMINOPHEN 325MG 975 MG: 325 TABLET ORAL at 21:08

## 2022-07-09 RX ADMIN — METHOCARBAMOL 500 MG: 500 TABLET ORAL at 14:23

## 2022-07-09 RX ADMIN — QUETIAPINE FUMARATE 50 MG: 25 TABLET ORAL at 21:08

## 2022-07-09 RX ADMIN — BUSPIRONE HYDROCHLORIDE 20 MG: 10 TABLET ORAL at 17:08

## 2022-07-09 RX ADMIN — ACETAMINOPHEN 325MG 975 MG: 325 TABLET ORAL at 05:43

## 2022-07-09 RX ADMIN — SENNOSIDES 8.6 MG: 8.6 TABLET ORAL at 21:08

## 2022-07-09 RX ADMIN — DOCUSATE SODIUM 100 MG: 100 CAPSULE, LIQUID FILLED ORAL at 10:03

## 2022-07-09 RX ADMIN — LITHIUM CARBONATE 300 MG: 300 TABLET, FILM COATED, EXTENDED RELEASE ORAL at 21:08

## 2022-07-09 NOTE — PROGRESS NOTES
Progress Note - Orthopedics   Melody D Cherise Saint 60 y o  female MRN: [id number]  Unit/Bed#: E2 -01 Encounter: [id number]    Assessment:  60 y o  female s/p left total knee arthroplasty POD 4, ABLA    Plan:  Pain control prn  PT/OT- WBAT left LE   Lovenox/TEDs/SCDs for DVT prophylaxis  D/c planning- patient would now like to go to rehab, case mgt aware    Subjective: Pt S&E  Admits some pain L knee  Now wants to go to rehab  No other complaints  Vitals: Blood pressure 118/69, pulse 74, temperature (!) 97 2 °F (36 2 °C), temperature source Temporal, resp  rate 16, height 5' (1 524 m), weight 80 7 kg (177 lb 14 6 oz), SpO2 97 %, not currently breastfeeding  ,Body mass index is 34 75 kg/m²        Intake/Output Summary (Last 24 hours) at 7/9/2022 0850  Last data filed at 7/8/2022 1835  Gross per 24 hour   Intake --   Output 1375 ml   Net -1375 ml       Invasive Devices  Report    None                 Ortho Exam: [place]:  Drsg:  Small amount of sanguinous drainage at base of dressing, intact, sensation grossly intact L4, L5, S1, palpable pedal pulse, EHL intact    Lab, Imaging and other studies:   CBC:   Lab Results   Component Value Date    WBC 8 13 07/09/2022    HGB 8 5 (L) 07/09/2022    HCT 27 1 (L) 07/09/2022    MCV 93 07/09/2022     07/09/2022    MCH 29 3 07/09/2022    MCHC 31 4 07/09/2022    RDW 15 4 (H) 07/09/2022    MPV 9 0 07/09/2022    NRBC 0 07/09/2022

## 2022-07-09 NOTE — PLAN OF CARE
Problem: Prexisting or High Potential for Compromised Skin Integrity  Goal: Skin integrity is maintained or improved  Description: INTERVENTIONS:  - Identify patients at risk for skin breakdown  - Assess and monitor skin integrity  - Assess and monitor nutrition and hydration status  - Monitor labs   - Assess for incontinence   - Turn and reposition patient  - Assist with mobility/ambulation  - Relieve pressure over bony prominences  - Avoid friction and shearing  - Provide appropriate hygiene as needed including keeping skin clean and dry  - Evaluate need for skin moisturizer/barrier cream  - Collaborate with interdisciplinary team   - Patient/family teaching  - Consider wound care consult   Outcome: Progressing     Problem: MOBILITY - ADULT  Goal: Maintain or return to baseline ADL function  Description: INTERVENTIONS:  -  Assess patient's ability to carry out ADLs; assess patient's baseline for ADL function and identify physical deficits which impact ability to perform ADLs (bathing, care of mouth/teeth, toileting, grooming, dressing, etc )  - Assess/evaluate cause of self-care deficits   - Assess range of motion  - Assess patient's mobility; develop plan if impaired  - Assess patient's need for assistive devices and provide as appropriate  - Encourage maximum independence but intervene and supervise when necessary  - Involve family in performance of ADLs  - Assess for home care needs following discharge   - Consider OT consult to assist with ADL evaluation and planning for discharge  - Provide patient education as appropriate  Outcome: Progressing  Goal: Maintains/Returns to pre admission functional level  Description: INTERVENTIONS:  - Perform BMAT or MOVE assessment daily    - Set and communicate daily mobility goal to care team and patient/family/caregiver     - Collaborate with rehabilitation services on mobility goals if consulted  - Out of bed for toileting  - Record patient progress and toleration of activity level   Outcome: Progressing     Problem: Potential for Falls  Goal: Patient will remain free of falls  Description: INTERVENTIONS:  - Educate patient/family on patient safety including physical limitations  - Instruct patient to call for assistance with activity   - Consult OT/PT to assist with strengthening/mobility   - Keep Call bell within reach  - Keep bed low and locked with side rails adjusted as appropriate  - Keep care items and personal belongings within reach  - Initiate and maintain comfort rounds  - Make Fall Risk Sign visible to staff  - Offer Toileting every 2 Hours, in advance of need  - Initiate/Maintain bed/chair alarm  - Obtain necessary fall risk management equipment: bed/chair alarm, call bell, gripper socks, bedside commode, walker, rounds  - Apply yellow socks and bracelet for high fall risk patients  - Consider moving patient to room near nurses station  Outcome: Progressing     Problem: PAIN - ADULT  Goal: Verbalizes/displays adequate comfort level or baseline comfort level  Description: Interventions:  - Encourage patient to monitor pain and request assistance  - Assess pain using appropriate pain scale  - Administer analgesics based on type and severity of pain and evaluate response  - Implement non-pharmacological measures as appropriate and evaluate response  - Consider cultural and social influences on pain and pain management  - Notify physician/advanced practitioner if interventions unsuccessful or patient reports new pain  Outcome: Progressing     Problem: DISCHARGE PLANNING  Goal: Discharge to home or other facility with appropriate resources  Description: INTERVENTIONS:  - Identify barriers to discharge w/patient and caregiver  - Arrange for needed discharge resources and transportation as appropriate  - Identify discharge learning needs (meds, wound care, etc )  - Arrange for interpretive services to assist at discharge as needed  - Refer to Case Management Department for coordinating discharge planning if the patient needs post-hospital services based on physician/advanced practitioner order or complex needs related to functional status, cognitive ability, or social support system  Outcome: Progressing     Problem: Knowledge Deficit  Goal: Patient/family/caregiver demonstrates understanding of disease process, treatment plan, medications, and discharge instructions  Description: Complete learning assessment and assess knowledge base    Interventions:  - Provide teaching at level of understanding  - Provide teaching via preferred learning methods  Outcome: Progressing     Problem: GENITOURINARY - ADULT  Goal: Maintains or returns to baseline urinary function  Description: INTERVENTIONS:  - After amos catheter is discontineud, assess urinary function  - Encourage oral fluids to ensure adequate hydration if ordered  - Administer IV fluids as ordered to ensure adequate hydration  - Administer ordered medications as needed  - Offer frequent toileting  - Follow urinary retention protocol if ordered  Outcome: Progressing  Goal: Absence of urinary retention  Description: INTERVENTIONS:  - Assess patient's ability to void and empty bladder  - Monitor I/O  - Bladder scan as needed  - Follow urine retention protocol if ordered  Outcome: Progressing     Problem: SKIN/TISSUE INTEGRITY - ADULT  Goal: Incision(s), wounds(s) or drain site(s) healing without S/S of infection  Description: INTERVENTIONS  - Assess and document dressing, incision, wound bed, drain sites and surrounding tissue  - Provide patient and family education  - Perform skin care/dressing changes as ordered  Outcome: Progressing     Problem: SAFETY ADULT  Goal: Patient will remain free of falls  Description: INTERVENTIONS:  - Educate patient/family on patient safety including physical limitations  - Instruct patient to call for assistance with activity   - Consult OT/PT to assist with strengthening/mobility   - Keep Call bell within reach  - Keep bed low and locked with side rails adjusted as appropriate  - Keep care items and personal belongings within reach  - Initiate and maintain comfort rounds  - Make Fall Risk Sign visible to staff  - Offer Toileting every 2 Hours, in advance of need  - Initiate/Maintain bed/chair alarm  - Obtain necessary fall risk management equipment: bed/chair alarm, call bell, gripper socks, bedside commode, walker, rounds  - Apply yellow socks and bracelet for high fall risk patients  - Consider moving patient to room near nurses station  Outcome: Progressing

## 2022-07-10 LAB
BASOPHILS # BLD AUTO: 0.03 THOUSANDS/ΜL (ref 0–0.1)
BASOPHILS NFR BLD AUTO: 0 % (ref 0–1)
EOSINOPHIL # BLD AUTO: 0.46 THOUSAND/ΜL (ref 0–0.61)
EOSINOPHIL NFR BLD AUTO: 6 % (ref 0–6)
ERYTHROCYTE [DISTWIDTH] IN BLOOD BY AUTOMATED COUNT: 16.1 % (ref 11.6–15.1)
HCT VFR BLD AUTO: 25.9 % (ref 34.8–46.1)
HGB BLD-MCNC: 8.2 G/DL (ref 11.5–15.4)
IMM GRANULOCYTES # BLD AUTO: 0.05 THOUSAND/UL (ref 0–0.2)
IMM GRANULOCYTES NFR BLD AUTO: 1 % (ref 0–2)
LYMPHOCYTES # BLD AUTO: 1.28 THOUSANDS/ΜL (ref 0.6–4.47)
LYMPHOCYTES NFR BLD AUTO: 17 % (ref 14–44)
MCH RBC QN AUTO: 29.5 PG (ref 26.8–34.3)
MCHC RBC AUTO-ENTMCNC: 31.7 G/DL (ref 31.4–37.4)
MCV RBC AUTO: 93 FL (ref 82–98)
MONOCYTES # BLD AUTO: 0.73 THOUSAND/ΜL (ref 0.17–1.22)
MONOCYTES NFR BLD AUTO: 10 % (ref 4–12)
NEUTROPHILS # BLD AUTO: 5.07 THOUSANDS/ΜL (ref 1.85–7.62)
NEUTS SEG NFR BLD AUTO: 66 % (ref 43–75)
NRBC BLD AUTO-RTO: 0 /100 WBCS
PLATELET # BLD AUTO: 315 THOUSANDS/UL (ref 149–390)
PMV BLD AUTO: 9.3 FL (ref 8.9–12.7)
RBC # BLD AUTO: 2.78 MILLION/UL (ref 3.81–5.12)
WBC # BLD AUTO: 7.62 THOUSAND/UL (ref 4.31–10.16)

## 2022-07-10 PROCEDURE — 97530 THERAPEUTIC ACTIVITIES: CPT

## 2022-07-10 PROCEDURE — 99024 POSTOP FOLLOW-UP VISIT: CPT | Performed by: ORTHOPAEDIC SURGERY

## 2022-07-10 PROCEDURE — 97116 GAIT TRAINING THERAPY: CPT

## 2022-07-10 PROCEDURE — 85025 COMPLETE CBC W/AUTO DIFF WBC: CPT | Performed by: ORTHOPAEDIC SURGERY

## 2022-07-10 RX ADMIN — ENOXAPARIN SODIUM 40 MG: 40 INJECTION SUBCUTANEOUS at 09:19

## 2022-07-10 RX ADMIN — POLYETHYLENE GLYCOL 3350 17 G: 17 POWDER, FOR SOLUTION ORAL at 12:41

## 2022-07-10 RX ADMIN — METHOCARBAMOL 500 MG: 500 TABLET ORAL at 09:16

## 2022-07-10 RX ADMIN — OXYCODONE HYDROCHLORIDE AND ACETAMINOPHEN 500 MG: 500 TABLET ORAL at 09:16

## 2022-07-10 RX ADMIN — OXYCODONE HYDROCHLORIDE 10 MG: 10 TABLET ORAL at 17:28

## 2022-07-10 RX ADMIN — PANTOPRAZOLE SODIUM 20 MG: 20 TABLET, DELAYED RELEASE ORAL at 05:42

## 2022-07-10 RX ADMIN — AMLODIPINE BESYLATE 10 MG: 10 TABLET ORAL at 09:16

## 2022-07-10 RX ADMIN — PAROXETINE 60 MG: 20 TABLET, FILM COATED ORAL at 09:16

## 2022-07-10 RX ADMIN — PROPRANOLOL HYDROCHLORIDE 10 MG: 20 TABLET ORAL at 12:38

## 2022-07-10 RX ADMIN — BENZTROPINE MESYLATE 1 MG: 1 TABLET ORAL at 09:16

## 2022-07-10 RX ADMIN — BUSPIRONE HYDROCHLORIDE 20 MG: 10 TABLET ORAL at 21:16

## 2022-07-10 RX ADMIN — FOLIC ACID 1000 MCG: 1 TABLET ORAL at 09:16

## 2022-07-10 RX ADMIN — OXYCODONE HYDROCHLORIDE 10 MG: 10 TABLET ORAL at 09:16

## 2022-07-10 RX ADMIN — LORAZEPAM 0.5 MG: 0.5 TABLET ORAL at 12:38

## 2022-07-10 RX ADMIN — QUETIAPINE FUMARATE 50 MG: 25 TABLET ORAL at 21:16

## 2022-07-10 RX ADMIN — BENZTROPINE MESYLATE 1 MG: 1 TABLET ORAL at 17:29

## 2022-07-10 RX ADMIN — BUSPIRONE HYDROCHLORIDE 20 MG: 10 TABLET ORAL at 17:28

## 2022-07-10 RX ADMIN — DOCUSATE SODIUM 100 MG: 100 CAPSULE, LIQUID FILLED ORAL at 17:29

## 2022-07-10 RX ADMIN — LITHIUM CARBONATE 300 MG: 300 TABLET, FILM COATED, EXTENDED RELEASE ORAL at 21:16

## 2022-07-10 RX ADMIN — PROPRANOLOL HYDROCHLORIDE 10 MG: 20 TABLET ORAL at 05:42

## 2022-07-10 RX ADMIN — ACETAMINOPHEN 325MG 975 MG: 325 TABLET ORAL at 21:16

## 2022-07-10 RX ADMIN — ACETAMINOPHEN 325MG 975 MG: 325 TABLET ORAL at 13:33

## 2022-07-10 RX ADMIN — BUSPIRONE HYDROCHLORIDE 20 MG: 10 TABLET ORAL at 09:16

## 2022-07-10 RX ADMIN — POTASSIUM CHLORIDE 10 MEQ: 750 TABLET, EXTENDED RELEASE ORAL at 09:16

## 2022-07-10 RX ADMIN — OXYCODONE HYDROCHLORIDE 10 MG: 10 TABLET ORAL at 13:33

## 2022-07-10 RX ADMIN — ACETAMINOPHEN 325MG 975 MG: 325 TABLET ORAL at 05:42

## 2022-07-10 RX ADMIN — ATORVASTATIN CALCIUM 40 MG: 40 TABLET, FILM COATED ORAL at 17:29

## 2022-07-10 RX ADMIN — LORAZEPAM 0.5 MG: 0.5 TABLET ORAL at 04:03

## 2022-07-10 RX ADMIN — SENNOSIDES 8.6 MG: 8.6 TABLET ORAL at 21:16

## 2022-07-10 RX ADMIN — DOCUSATE SODIUM 100 MG: 100 CAPSULE, LIQUID FILLED ORAL at 09:16

## 2022-07-10 RX ADMIN — VALBENAZINE 80 MG: 80 CAPSULE ORAL at 09:19

## 2022-07-10 RX ADMIN — OXYCODONE HYDROCHLORIDE 10 MG: 10 TABLET ORAL at 03:57

## 2022-07-10 NOTE — PHYSICAL THERAPY NOTE
Physical Therapy Treatment Note     07/10/22 1033   PT Last Visit   PT Visit Date 07/10/22   Note Type   Note Type BID visit/treatment   Pain Assessment   Pain Assessment Tool 0-10   Pain Score 8   Pain Location/Orientation Orientation: Left; Location: Knee   Restrictions/Precautions   Weight Bearing Precautions Per Order Yes   LLE Weight Bearing Per Order WBAT   Other Precautions Fall Risk;Pain; Chair Alarm   General   Chart Reviewed Yes   Subjective   Subjective Pt  seated on chair upon entry  Reported " are you Liss Fernandez? I want to talk to my nurse  I think I want to go home  My daughter will come to pick me up tomorrow"  Pt  agreeable to PT  Transfers   Sit to Stand   (CGA/CS)   Additional items Assist x 1; Armrests   Stand to Sit 5  Supervision   Additional items Assist x 1; Armrests   Stand pivot   (CGA)   Additional items Assist x 1; Increased time required   Toilet transfer   (S for Stand to sit and Mod A for sit to stand)   Additional items Assist x 1; Increased time required;Standard toilet   Ambulation/Elevation   Gait pattern Decreased foot clearance; Forward Flexion; Antalgic; Improper Weight shift; Inconsistent zack; Foward flexed; Short stride; Excessively slow;Decreased toe off;Decreased heel strike   Gait Assistance   (CGA/CS)   Additional items Assist x 1;Verbal cues   Assistive Device Rolling walker   Distance 10ft, 14ft, 16ft, 20ft   Stair Management Assistance   (CGA)   Additional items Assist x 1;Verbal cues   Stair Management Technique Two rails; Step to pattern; Foreward;Nonreciprocal   Number of Stairs 2   Balance   Static Sitting Good   Dynamic Sitting Fair +   Static Standing Fair   Dynamic Standing Fair -   Ambulatory Fair -   Endurance Deficit   Endurance Deficit No   Activity Tolerance   Activity Tolerance Patient tolerated treatment well   Nurse Made Aware yes   Assessment   Prognosis Fair   Problem List Decreased strength;Decreased range of motion;Decreased mobility; Decreased endurance;Pain;Orthopedic restrictions; Obesity   Assessment pt  progressing well with mobility  pt  needed much encouragement to ambulate increased distance  Pt  needed only CGA/CS for all ambulation and transfers except Mod A x 1 for sit to stand transfer from toilet seat  pt  noted with self limiting behaviours and needed much encouragement and education to participate actively in completing tasks  Cues for LE sequencing given during stair negotiation  Pt  also given cues to take longer strides and to push the RW slightly further forward as patient tend to keep it extremely close and takes very short steps  Assist provided in negotiating RW to improve the gait pace and to have better distance for safe ambulation and not to take strides beyond the RW  Pt  reported shes worried about falling  No LOB or instability noted t/o session  Will continue to follow during the stay to maximize functional mobility  Pt  is not ambulating household distances yet  Barriers to Discharge Inaccessible home environment   Goals   Patient Goals None reported   STG Expiration Date 07/13/22   PT Treatment Day 6   Plan   Treatment/Interventions Functional transfer training;Elevations; Patient/family training;Gait training;Equipment eval/education;Spoke to nursing;Spoke to case management;Spoke to advanced practitioner;OT  (PT)   Progress Progressing toward goals   PT Frequency Twice a day   Recommendation   PT Discharge Recommendation   (Rehab vs Home PT and family support pending continued progress)   Equipment Recommended Walker   AM-PAC Basic Mobility Inpatient   Turning in Bed Without Bedrails 3   Lying on Back to Sitting on Edge of Flat Bed 3   Moving Bed to Chair 3   Standing Up From Chair 3   Walk in Room 3   Climb 3-5 Stairs 3   Basic Mobility Inpatient Raw Score 18   Basic Mobility Standardized Score 41 05   Highest Level Of Mobility   JH-HLM Goal 6: Walk 10 steps or more   JH-HLM Achieved 6: Walk 10 steps or more   End of Consult   Patient Position at End of Consult All needs within reach;Bed/Chair alarm activated; Bedside chair         Atif Reyes PTA    An AM-PAC basic mobility standardized score less than 42 9 suggest the patient may benefit from discharge to post-acute rehab services

## 2022-07-10 NOTE — PLAN OF CARE
Problem: PHYSICAL THERAPY ADULT  Goal: Performs mobility at highest level of function for planned discharge setting  See evaluation for individualized goals  Description: Treatment/Interventions: Functional transfer training, LE strengthening/ROM, Elevations, Therapeutic exercise, Endurance training, Patient/family training, Bed mobility, Gait training, Spoke to nursing, OT          See flowsheet documentation for full assessment, interventions and recommendations  7/10/2022 1522 by Atif Reyes PTA  Outcome: Progressing  Note: Prognosis: Fair  Problem List: Decreased strength, Decreased range of motion, Decreased endurance, Decreased mobility, Impaired judgement, Obesity, Pain, Orthopedic restrictions  Assessment: Pt  showed good progress in her ambulation distance this session to 60ft with 1 brief standing rest  Provided CGA/Cs for all ambulation and stair negotiation  pt  continues to require cues for LE sequencing during stair negotiation  Pt  needed extended time for supine to sit transfer and cues for hand palcement for STS transfer  Min A for LE management provided for sit to supine transfer  Continued to provided assist to push the RW forward to a safe distance during ambulation as patient has the tendency to keep it too close to her and take very short steps  Pt  unable to follow when VCs given to correct hence physical assist provided for pushing the RW  Pt  positioned back in bed with bed alarm on and all needs within reach  talked to ronny PT about patient's progress and changed to DCP to rehab vs home PT pending patient consistently continue to progress her mobility  Barriers to Discharge: None  Barriers to Discharge Comments: stairs     PT Discharge Recommendation:  (Rehab vs Home PT and family support pending continued progress)          See flowsheet documentation for full assessment       7/10/2022 1250 by Atif Reyes PTA  Outcome: Progressing  Note: Prognosis: Fair  Problem List: Decreased strength, Decreased range of motion, Decreased mobility, Decreased endurance, Pain, Orthopedic restrictions, Obesity  Assessment: pt  progressing well with mobility  pt  needed much encouragement to ambulate increased distance  Pt  needed only CGA/CS for all ambulation and transfers except Mod A x 1 for sit to stand transfer from toilet seat  pt  noted with self limiting behaviours and needed much encouragement and education to participate actively in completing tasks  Cues for LE sequencing given during stair negotiation  Pt  also given cues to take longer strides and to push the RW slightly further forward as patient tend to keep it extremely close and takes very short steps  Assist provided in negotiating RW to improve the gait pace and to have better distance for safe ambulation and not to take strides beyond the RW  Pt  reported shes worried about falling  No LOB or instability noted t/o session  Will continue to follow during the stay to maximize functional mobility  Pt  is not ambulating household distances yet  Barriers to Discharge: Inaccessible home environment  Barriers to Discharge Comments: stairs     PT Discharge Recommendation:  (Rehab vs Home PT and family support pending continued progress)          See flowsheet documentation for full assessment

## 2022-07-10 NOTE — PROGRESS NOTES
Progress Note - Orthopedics   Samantha Mireles 60 y o  female MRN: [id number]  Unit/Bed#: E2 -01 Encounter: [id number]    Assessment:  60 y o  female s/p left total knee arthroplasty POD 5, ABLA    Plan:  Pain control prn  Bowel regimen prn  PT/OT- WBAT left LE   Lovenox/TEDs/SCDs for DVT prophylaxis  D/c planning    Subjective: Pt S&E  Sitting up in chair  Admits pain in left knee  Denies other issues  Unsure of last BM  Vitals: Blood pressure 141/87, pulse 85, temperature 98 2 °F (36 8 °C), temperature source Temporal, resp  rate 16, height 5' (1 524 m), weight 80 7 kg (177 lb 14 6 oz), SpO2 99 %, not currently breastfeeding  ,Body mass index is 34 75 kg/m²      No intake or output data in the 24 hours ending 07/10/22 1108    Invasive Devices  Report    None                 Ortho Exam: Estelle Becker:  Drsg:  Small amount of sanguinous drainage at distal aspect, intact, sensation grossly intact L4, L5, S1, palpable pedal pulse, EHL/FHL intact    Lab, Imaging and other studies:   CBC:   Lab Results   Component Value Date    WBC 7 62 07/10/2022    HGB 8 2 (L) 07/10/2022    HCT 25 9 (L) 07/10/2022    MCV 93 07/10/2022     07/10/2022    MCH 29 5 07/10/2022    MCHC 31 7 07/10/2022    RDW 16 1 (H) 07/10/2022    MPV 9 3 07/10/2022    NRBC 0 07/10/2022     CMP: No results found for: SODIUM, CL, CO2, ANIONGAP, BUN, CREATININE, GLUCOSE, CALCIUM, AST, ALT, ALKPHOS, PROT, BILITOT, EGFR

## 2022-07-10 NOTE — PHYSICAL THERAPY NOTE
Physical Therapy Treatment Note     07/10/22 1456   PT Last Visit   PT Visit Date 07/10/22   Note Type   Note Type BID visit/treatment   Pain Assessment   Pain Assessment Tool 0-10   Pain Score 8   Pain Location/Orientation Location: Knee;Orientation: Left   Restrictions/Precautions   Weight Bearing Precautions Per Order Yes   LLE Weight Bearing Per Order WBAT   Other Precautions Fall Risk;Pain;WBS; Bed Alarm;Cognitive   General   Chart Reviewed Yes   Subjective   Subjective Pt  in bed upon entry  Agreeable to PT with encouragement  Reported "I am going home tomorrow"  Bed Mobility   Supine to Sit 5  Supervision   Additional items Bedrails; Increased time required   Sit to Supine 4  Minimal assistance   Additional items Assist x 1;LE management; Increased time required; Bedrails   Transfers   Sit to Stand 5  Supervision   Additional items Assist x 1;Verbal cues; Increased time required   Stand to Sit 5  Supervision   Additional items Assist x 1; Increased time required   Stand pivot 5  Supervision   Additional items Assist x 1;Verbal cues; Impulsive   Ambulation/Elevation   Gait pattern Improper Weight shift; Antalgic; Forward Flexion;Decreased foot clearance;Decreased L stance; Inconsistent zack; Foward flexed; Short stride; Excessively slow;Decreased toe off;Decreased heel strike   Gait Assistance   (CGA/CS)   Additional items Assist x 1;Verbal cues   Assistive Device Rolling walker   Distance 60ft with one brief standing rest   Stair Management Assistance   (CGA/CS)   Additional items Assist x 1;Verbal cues   Stair Management Technique Two rails; Step to pattern;Nonreciprocal;Foreward   Number of Stairs 2   Balance   Static Sitting Good   Dynamic Sitting Fair +   Static Standing Fair   Dynamic Standing Fair -   Ambulatory Fair -   Endurance Deficit   Endurance Deficit Yes   Endurance Deficit Description Fatigue   Activity Tolerance   Activity Tolerance Patient tolerated treatment well   Nurse Made Aware Yes   Assessment Prognosis Fair   Problem List Decreased strength;Decreased range of motion;Decreased endurance;Decreased mobility; Impaired judgement;Obesity;Pain;Orthopedic restrictions   Assessment Pt  showed good progress in her ambulation distance this session to 60ft with 1 brief standing rest  Provided CGA/Cs for all ambulation and stair negotiation  pt  continues to require cues for LE sequencing during stair negotiation  Pt  needed extended time for supine to sit transfer and cues for hand palcement for STS transfer  Min A for LE management provided for sit to supine transfer  Continued to provided assist to push the RW forward to a safe distance during ambulation as patient has the tendency to keep it too close to her and take very short steps  Pt  unable to follow when VCs given to correct hence physical assist provided for pushing the RW  Pt  positioned back in bed with bed alarm on and all needs within reach  talked to ronny PT about patient's progress and changed to DCP to rehab vs home PT pending patient consistently continue to progress her mobility  Barriers to Discharge None   Goals   Patient Goals None reported   STG Expiration Date 07/13/22   PT Treatment Day 7   Plan   Treatment/Interventions Functional transfer training;Elevations; Patient/family training;Equipment eval/education; Bed mobility;Gait training;Spoke to nursing   Progress Progressing toward goals   PT Frequency Twice a day   AM-PAC Basic Mobility Inpatient   Turning in Bed Without Bedrails 3   Lying on Back to Sitting on Edge of Flat Bed 3   Moving Bed to Chair 3   Standing Up From Chair 4   Walk in Room 3   Climb 3-5 Stairs 3   Basic Mobility Inpatient Raw Score 19   Basic Mobility Standardized Score 42 48   Highest Level Of Mobility   JH-HLM Goal 6: Walk 10 steps or more   JH-HLM Achieved 7: Walk 25 feet or more         Chris Vera PTA    An AM-PAC basic mobility standardized score less than 42 9 suggest the patient may benefit from discharge to post-acute rehab services

## 2022-07-10 NOTE — PLAN OF CARE
Problem: Prexisting or High Potential for Compromised Skin Integrity  Goal: Skin integrity is maintained or improved  Description: INTERVENTIONS:  - Identify patients at risk for skin breakdown  - Assess and monitor skin integrity  - Assess and monitor nutrition and hydration status  - Monitor labs   - Assess for incontinence   - Turn and reposition patient  - Assist with mobility/ambulation  - Relieve pressure over bony prominences  - Avoid friction and shearing  - Provide appropriate hygiene as needed including keeping skin clean and dry  - Evaluate need for skin moisturizer/barrier cream  - Collaborate with interdisciplinary team   - Patient/family teaching  - Consider wound care consult   Outcome: Progressing     Problem: MOBILITY - ADULT  Goal: Maintain or return to baseline ADL function  Description: INTERVENTIONS:  -  Assess patient's ability to carry out ADLs; assess patient's baseline for ADL function and identify physical deficits which impact ability to perform ADLs (bathing, care of mouth/teeth, toileting, grooming, dressing, etc )  - Assess/evaluate cause of self-care deficits   - Assess range of motion  - Assess patient's mobility; develop plan if impaired  - Assess patient's need for assistive devices and provide as appropriate  - Encourage maximum independence but intervene and supervise when necessary  - Involve family in performance of ADLs  - Assess for home care needs following discharge   - Consider OT consult to assist with ADL evaluation and planning for discharge  - Provide patient education as appropriate  Outcome: Progressing  Goal: Maintains/Returns to pre admission functional level  Description: INTERVENTIONS:  - Perform BMAT or MOVE assessment daily    - Set and communicate daily mobility goal to care team and patient/family/caregiver  - Collaborate with rehabilitation services on mobility goals if consulted  - Perform Range of Motion 4 times a day    - Reposition patient every 2 hours   - Dangle patient 3 times a day  - Stand patient 3 times a day  - Ambulate patient 3 times a day  - Out of bed to chair 3 times a day   - Out of bed for meals 3  Problem: Potential for Falls  Goal: Patient will remain free of falls  Description: INTERVENTIONS:  - Educate patient/family on patient safety including physical limitations  - Instruct patient to call for assistance with activity   - Consult OT/PT to assist with strengthening/mobility   - Keep Call bell within reach  - Keep bed low and locked with side rails adjusted as appropriate  - Keep care items and personal belongings within reach  - Initiate and maintain comfort rounds  - Make Fall Risk Sign visible to staff  - Offer Toileting every 2 Hours, in advance of need  - Initiate/Maintain bed/chair alarm  - Obtain necessary fall risk management equipment: bed/chair alarm, call bell, gripper socks, bedside commode, walker, rounds  - Apply yellow socks and bracelet for high fall risk patients  - Consider moving patient to room near nurses station  Outcome: Progressing     Problem: PAIN - ADULT  Goal: Verbalizes/displays adequate comfort level or baseline comfort level  Description: Interventions:  - Encourage patient to monitor pain and request assistance  - Assess pain using appropriate pain scale  - Administer analgesics based on type and severity of pain and evaluate response  - Implement non-pharmacological measures as appropriate and evaluate response  - Consider cultural and social influences on pain and pain management  - Notify physician/advanced practitioner if interventions unsuccessful or patient reports new pain  Outcome: Progressing     Problem: DISCHARGE PLANNING  Goal: Discharge to home or other facility with appropriate resources  Description: INTERVENTIONS:  - Identify barriers to discharge w/patient and caregiver  - Arrange for needed discharge resources and transportation as appropriate  - Identify discharge learning needs (meds, wound care, etc )  - Arrange for interpretive services to assist at discharge as needed  - Refer to Case Management Department for coordinating discharge planning if the patient needs post-hospital services based on physician/advanced practitioner order or complex needs related to functional status, cognitive ability, or social support system  Outcome: Progressing     Problem: SAFETY ADULT  Goal: Patient will remain free of falls  Description: INTERVENTIONS:  - Educate patient/family on patient safety including physical limitations  - Instruct patient to call for assistance with activity   - Consult OT/PT to assist with strengthening/mobility   - Keep Call bell within reach  - Keep bed low and locked with side rails adjusted as appropriate  - Keep care items and personal belongings within reach  - Initiate and maintain comfort rounds  - Make Fall Risk Sign visible to staff  - Offer Toileting every 2 Hours, in advance of need  - Initiate/Maintain bed/chair alarm  - Obtain necessary fall risk management equipment: bed/chair alarm, call bell, gripper socks, bedside commode, walker, rounds  - Apply yellow socks and bracelet for high fall risk patients  - Consider moving patient to room near nurses station  Outcome: Progressing     Problem: Knowledge Deficit  Goal: Patient/family/caregiver demonstrates understanding of disease process, treatment plan, medications, and discharge instructions  Description: Complete learning assessment and assess knowledge base    Interventions:  - Provide teaching at level of understanding  - Provide teaching via preferred learning methods  Outcome: Progressing     Problem: GENITOURINARY - ADULT  Goal: Maintains or returns to baseline urinary function  Description: INTERVENTIONS:  - After amos catheter is discontineud, assess urinary function  - Encourage oral fluids to ensure adequate hydration if ordered  - Administer IV fluids as ordered to ensure adequate hydration  - Administer ordered medications as needed  - Offer frequent toileting  - Follow urinary retention protocol if ordered  Outcome: Progressing  Goal: Absence of urinary retention  Description: INTERVENTIONS:  - Assess patient's ability to void and empty bladder  - Monitor I/O  - Bladder scan as needed  - Follow urine retention protocol if ordered  Outcome: Progressing     Problem: SKIN/TISSUE INTEGRITY - ADULT  Goal: Incision(s), wounds(s) or drain site(s) healing without S/S of infection  Description: INTERVENTIONS  - Assess and document dressing, incision, wound bed, drain sites and surrounding tissue  - Provide patient and family education  - Perform skin care/dressing changes as ordered  Outcome: Progressing    times a day  - Out of bed for toileting  - Record patient progress and toleration of activity level   Outcome: Progressing

## 2022-07-10 NOTE — PLAN OF CARE
Problem: PHYSICAL THERAPY ADULT  Goal: Performs mobility at highest level of function for planned discharge setting  See evaluation for individualized goals  Description: Treatment/Interventions: Functional transfer training, LE strengthening/ROM, Elevations, Therapeutic exercise, Endurance training, Patient/family training, Bed mobility, Gait training, Spoke to nursing, OT          See flowsheet documentation for full assessment, interventions and recommendations  Outcome: Progressing  Note: Prognosis: Fair  Problem List: Decreased strength, Decreased range of motion, Decreased mobility, Decreased endurance, Pain, Orthopedic restrictions, Obesity  Assessment: pt  progressing well with mobility  pt  needed much encouragement to ambulate increased distance  Pt  needed only CGA/CS for all ambulation and transfers except Mod A x 1 for sit to stand transfer from toilet seat  pt  noted with self limiting behaviours and needed much encouragement and education to participate actively in completing tasks  Cues for LE sequencing given during stair negotiation  Pt  also given cues to take longer strides and to push the RW slightly further forward as patient tend to keep it extremely close and takes very short steps  Assist provided in negotiating RW to improve the gait pace and to have better distance for safe ambulation and not to take strides beyond the RW  Pt  reported shes worried about falling  No LOB or instability noted t/o session  Will continue to follow during the stay to maximize functional mobility  Pt  is not ambulating household distances yet  Barriers to Discharge: Inaccessible home environment  Barriers to Discharge Comments: stairs     PT Discharge Recommendation:  (Rehab vs Home PT and family support pending continued progress)          See flowsheet documentation for full assessment

## 2022-07-11 ENCOUNTER — PATIENT OUTREACH (OUTPATIENT)
Dept: INTERNAL MEDICINE CLINIC | Facility: CLINIC | Age: 57
End: 2022-07-11

## 2022-07-11 PROCEDURE — 97110 THERAPEUTIC EXERCISES: CPT

## 2022-07-11 PROCEDURE — 97116 GAIT TRAINING THERAPY: CPT

## 2022-07-11 PROCEDURE — 97535 SELF CARE MNGMENT TRAINING: CPT

## 2022-07-11 PROCEDURE — 99024 POSTOP FOLLOW-UP VISIT: CPT | Performed by: PHYSICIAN ASSISTANT

## 2022-07-11 RX ADMIN — LITHIUM CARBONATE 300 MG: 300 TABLET, FILM COATED, EXTENDED RELEASE ORAL at 21:39

## 2022-07-11 RX ADMIN — BENZTROPINE MESYLATE 1 MG: 1 TABLET ORAL at 08:52

## 2022-07-11 RX ADMIN — BUSPIRONE HYDROCHLORIDE 20 MG: 10 TABLET ORAL at 21:39

## 2022-07-11 RX ADMIN — LORAZEPAM 0.5 MG: 0.5 TABLET ORAL at 05:16

## 2022-07-11 RX ADMIN — BUSPIRONE HYDROCHLORIDE 20 MG: 10 TABLET ORAL at 08:52

## 2022-07-11 RX ADMIN — PROPRANOLOL HYDROCHLORIDE 10 MG: 20 TABLET ORAL at 12:15

## 2022-07-11 RX ADMIN — PAROXETINE 60 MG: 20 TABLET, FILM COATED ORAL at 08:52

## 2022-07-11 RX ADMIN — HYDROXYZINE HYDROCHLORIDE 50 MG: 25 TABLET ORAL at 13:38

## 2022-07-11 RX ADMIN — ENOXAPARIN SODIUM 40 MG: 40 INJECTION SUBCUTANEOUS at 08:52

## 2022-07-11 RX ADMIN — PANTOPRAZOLE SODIUM 20 MG: 20 TABLET, DELAYED RELEASE ORAL at 05:16

## 2022-07-11 RX ADMIN — QUETIAPINE FUMARATE 50 MG: 25 TABLET ORAL at 21:39

## 2022-07-11 RX ADMIN — FOLIC ACID 1000 MCG: 1 TABLET ORAL at 08:52

## 2022-07-11 RX ADMIN — ACETAMINOPHEN 325MG 975 MG: 325 TABLET ORAL at 05:16

## 2022-07-11 RX ADMIN — OXYCODONE HYDROCHLORIDE AND ACETAMINOPHEN 500 MG: 500 TABLET ORAL at 08:52

## 2022-07-11 RX ADMIN — ACETAMINOPHEN 325MG 975 MG: 325 TABLET ORAL at 21:39

## 2022-07-11 RX ADMIN — POTASSIUM CHLORIDE 10 MEQ: 750 TABLET, EXTENDED RELEASE ORAL at 08:52

## 2022-07-11 RX ADMIN — ACETAMINOPHEN 325MG 975 MG: 325 TABLET ORAL at 14:14

## 2022-07-11 RX ADMIN — DOCUSATE SODIUM 100 MG: 100 CAPSULE, LIQUID FILLED ORAL at 08:52

## 2022-07-11 RX ADMIN — DOCUSATE SODIUM 100 MG: 100 CAPSULE, LIQUID FILLED ORAL at 18:04

## 2022-07-11 RX ADMIN — BUSPIRONE HYDROCHLORIDE 20 MG: 10 TABLET ORAL at 16:09

## 2022-07-11 RX ADMIN — POLYETHYLENE GLYCOL 3350 17 G: 17 POWDER, FOR SOLUTION ORAL at 08:58

## 2022-07-11 RX ADMIN — SENNOSIDES 8.6 MG: 8.6 TABLET ORAL at 21:39

## 2022-07-11 RX ADMIN — BENZTROPINE MESYLATE 1 MG: 1 TABLET ORAL at 18:04

## 2022-07-11 RX ADMIN — VALBENAZINE 80 MG: 80 CAPSULE ORAL at 09:01

## 2022-07-11 RX ADMIN — ATORVASTATIN CALCIUM 40 MG: 40 TABLET, FILM COATED ORAL at 16:09

## 2022-07-11 RX ADMIN — OXYCODONE HYDROCHLORIDE 10 MG: 10 TABLET ORAL at 02:51

## 2022-07-11 RX ADMIN — OXYCODONE HYDROCHLORIDE 10 MG: 10 TABLET ORAL at 21:39

## 2022-07-11 RX ADMIN — AMLODIPINE BESYLATE 10 MG: 10 TABLET ORAL at 08:52

## 2022-07-11 RX ADMIN — OXYCODONE HYDROCHLORIDE 10 MG: 10 TABLET ORAL at 12:15

## 2022-07-11 NOTE — UTILIZATION REVIEW
Elective Surgical Continued Stay Review    Date:    FOR  7/10/22      POD#: 5    Current Patient Class:    Inpatient  Current Level of Care:    Med surg    Assessment/Plan: 60 y o  female, initial surgery date    7/5/22   L  Total knee arthroplasty    7/10/22      POD    #  5  Continue post op care  Continue  PT/OT/WBAT    LLE  Continue pain control as needed, does complain of  Left knee pain  Needs STR        Pertinent Labs/Diagnostic Results:       Results from last 7 days   Lab Units 07/10/22  0501 07/09/22  0804 07/08/22  0503 07/07/22  0505 07/06/22  0523   WBC Thousand/uL 7 62 8 13 8 03 7 32 9 45   HEMOGLOBIN g/dL 8 2* 8 5* 8 4* 8 8* 10 2*   HEMATOCRIT % 25 9* 27 1* 26 3* 27 4* 32 0*   PLATELETS Thousands/uL 315 289 285 254 314   NEUTROS ABS Thousands/µL 5 07 5 64  --   --   --          Results from last 7 days   Lab Units 07/08/22  0503 07/07/22  0505 07/06/22  0523   SODIUM mmol/L 142 139 140   POTASSIUM mmol/L 3 7 3 2* 3 5   CHLORIDE mmol/L 104 102 104   CO2 mmol/L 30 26 27   ANION GAP mmol/L 8 11 9   BUN mg/dL 5 10 16   CREATININE mg/dL 0 66 0 75 0 90   EGFR ml/min/1 73sq m 97 88 70   CALCIUM mg/dL 8 1* 7 8* 8 1*         Results from last 7 days   Lab Units 07/08/22  0635   POC GLUCOSE mg/dl 102     Results from last 7 days   Lab Units 07/08/22  0503 07/07/22  0505 07/06/22  0523   GLUCOSE RANDOM mg/dL 96 94 111                 Vital Signs:   98 °F (36 7 °C) 88 18 110/85 90 -- None (Room air) Lying    07/10/22 0527 98 2 °F (36 8 °C) 85 16 141/87 96 99 % None (Room air) Lying         Medications:   Scheduled Medications:  acetaminophen, 975 mg, Oral, Q8H KESHIA  amLODIPine, 10 mg, Oral, Daily  ascorbic acid, 500 mg, Oral, Daily  atorvastatin, 40 mg, Oral, Daily With Dinner  benztropine, 1 mg, Oral, BID  busPIRone, 20 mg, Oral, TID  docusate sodium, 100 mg, Oral, BID  enoxaparin, 40 mg, Subcutaneous, Daily  folic acid, 3,532 mcg, Oral, Daily  lithium carbonate, 300 mg, Oral, HS  pantoprazole, 20 mg, Oral, Early Morning  PARoxetine, 60 mg, Oral, Daily  potassium chloride, 10 mEq, Oral, Daily  propranolol, 10 mg, Oral, BID before breakfast/lunch  QUEtiapine, 50 mg, Oral, HS  senna, 1 tablet, Oral, HS  Valbenazine Tosylate, 80 mg, Oral, Daily      Continuous IV Infusions:     PRN Meds:  bisacodyl, 10 mg, Rectal, Daily PRN  calcium carbonate, 1,000 mg, Oral, Daily PRN  HYDROmorphone, 0 5 mg, Intravenous, Q4H PRN  hydrOXYzine HCL, 50 mg, Oral, TID PRN  LORazepam, 0 5 mg, Oral, BID PRN  methocarbamol, 500 mg, Oral, TID PRN  oxyCODONE, 10 mg, Oral, Q4H PRN  oxyCODONE, 5 mg, Oral, Q4H PRN  polyethylene glycol, 17 g, Oral, Daily PRN          Discharge Plan:    D    Network Utilization Review Department  ATTENTION: Please call with any questions or concerns to [phone] and carefully listen to the prompts so that you are directed to the right person  All voicemails are confidential   [facility] all requests for admission clinical reviews, approved or denied determinations and any other requests to dedicated fax number below belonging to the campus where the patient is receiving treatment   List of dedicated fax numbers for the Facilities:  [address] DENIALS (Administrative/Medical Necessity) [phone]   [address] (Maternity/NICU/Pediatrics) [phone]   [address]  [phone]   [address] [id number] [place] [address] [address] [phone]

## 2022-07-11 NOTE — PLAN OF CARE
Problem: Prexisting or High Potential for Compromised Skin Integrity  Goal: Skin integrity is maintained or improved  Description: INTERVENTIONS:  - Identify patients at risk for skin breakdown  - Assess and monitor skin integrity  - Assess and monitor nutrition and hydration status  - Monitor labs   - Assess for incontinence   - Turn and reposition patient  - Assist with mobility/ambulation  - Relieve pressure over bony prominences  - Avoid friction and shearing  - Provide appropriate hygiene as needed including keeping skin clean and dry  - Evaluate need for skin moisturizer/barrier cream  - Collaborate with interdisciplinary team   - Patient/family teaching  - Consider wound care consult   Outcome: Progressing     Problem: MOBILITY - ADULT  Goal: Maintain or return to baseline ADL function  Description: INTERVENTIONS:  -  Assess patient's ability to carry out ADLs; assess patient's baseline for ADL function and identify physical deficits which impact ability to perform ADLs (bathing, care of mouth/teeth, toileting, grooming, dressing, etc )  - Assess/evaluate cause of self-care deficits   - Assess range of motion  - Assess patient's mobility; develop plan if impaired  - Assess patient's need for assistive devices and provide as appropriate  - Encourage maximum independence but intervene and supervise when necessary  - Involve family in performance of ADLs  - Assess for home care needs following discharge   - Consider OT consult to assist with ADL evaluation and planning for discharge  - Provide patient education as appropriate  Outcome: Progressing  Goal: Maintains/Returns to pre admission functional level  Description: INTERVENTIONS:  - Perform BMAT or MOVE assessment daily    - Set and communicate daily mobility goal to care team and patient/family/caregiver  - Collaborate with rehabilitation services on mobility goals if consulted  - Perform Range of Motion 2 times a day    - Reposition patient every 2 hours   - Dangle patient 2 times a day  - Stand patient 2 times a day  - Ambulate patient 2 times a day  - Out of bed to chair 2 times a day   - Out of bed for meals 2 times a day  - Out of bed for toileting  - Record patient progress and toleration of activity level   Outcome: Progressing     Problem: Potential for Falls  Goal: Patient will remain free of falls  Description: INTERVENTIONS:  - Educate patient/family on patient safety including physical limitations  - Instruct patient to call for assistance with activity   - Consult OT/PT to assist with strengthening/mobility   - Keep Call bell within reach  - Keep bed low and locked with side rails adjusted as appropriate  - Keep care items and personal belongings within reach  - Initiate and maintain comfort rounds  - Make Fall Risk Sign visible to staff  - Offer Toileting every 2 Hours, in advance of need  - Initiate/Maintain bed/chair alarm  - Obtain necessary fall risk management equipment: bed/chair alarm, call bell, gripper socks, bedside commode, walker, rounds  - Apply yellow socks and bracelet for high fall risk patients  - Consider moving patient to room near nurses station  Outcome: Progressing     Problem: PAIN - ADULT  Goal: Verbalizes/displays adequate comfort level or baseline comfort level  Description: Interventions:  - Encourage patient to monitor pain and request assistance  - Assess pain using appropriate pain scale  - Administer analgesics based on type and severity of pain and evaluate response  - Implement non-pharmacological measures as appropriate and evaluate response  - Consider cultural and social influences on pain and pain management  - Notify physician/advanced practitioner if interventions unsuccessful or patient reports new pain  Outcome: Progressing     Problem: DISCHARGE PLANNING  Goal: Discharge to home or other facility with appropriate resources  Description: INTERVENTIONS:  - Identify barriers to discharge w/patient and caregiver  - Arrange for needed discharge resources and transportation as appropriate  - Identify discharge learning needs (meds, wound care, etc )  - Arrange for interpretive services to assist at discharge as needed  - Refer to Case Management Department for coordinating discharge planning if the patient needs post-hospital services based on physician/advanced practitioner order or complex needs related to functional status, cognitive ability, or social support system  Outcome: Progressing     Problem: Knowledge Deficit  Goal: Patient/family/caregiver demonstrates understanding of disease process, treatment plan, medications, and discharge instructions  Description: Complete learning assessment and assess knowledge base    Interventions:  - Provide teaching at level of understanding  - Provide teaching via preferred learning methods  Outcome: Progressing     Problem: GENITOURINARY - ADULT  Goal: Maintains or returns to baseline urinary function  Description: INTERVENTIONS:  - After amos catheter is discontineud, assess urinary function  - Encourage oral fluids to ensure adequate hydration if ordered  - Administer IV fluids as ordered to ensure adequate hydration  - Administer ordered medications as needed  - Offer frequent toileting  - Follow urinary retention protocol if ordered  Outcome: Progressing  Goal: Absence of urinary retention  Description: INTERVENTIONS:  - Assess patient's ability to void and empty bladder  - Monitor I/O  - Bladder scan as needed  - Follow urine retention protocol if ordered  Outcome: Progressing     Problem: SKIN/TISSUE INTEGRITY - ADULT  Goal: Incision(s), wounds(s) or drain site(s) healing without S/S of infection  Description: INTERVENTIONS  - Assess and document dressing, incision, wound bed, drain sites and surrounding tissue  - Provide patient and family education  - Perform skin care/dressing changes as ordered  Outcome: Progressing     Problem: SAFETY ADULT  Goal: Patient will remain free of falls  Description: INTERVENTIONS:  - Educate patient/family on patient safety including physical limitations  - Instruct patient to call for assistance with activity   - Consult OT/PT to assist with strengthening/mobility   - Keep Call bell within reach  - Keep bed low and locked with side rails adjusted as appropriate  - Keep care items and personal belongings within reach  - Initiate and maintain comfort rounds  - Make Fall Risk Sign visible to staff  - Offer Toileting every 2 Hours, in advance of need  - Initiate/Maintain bed/chair alarm  - Obtain necessary fall risk management equipment: bed/chair alarm, call bell, gripper socks, bedside commode, walker, rounds  - Apply yellow socks and bracelet for high fall risk patients  - Consider moving patient to room near nurses station  Outcome: Progressing

## 2022-07-11 NOTE — PLAN OF CARE
Problem: PHYSICAL THERAPY ADULT  Goal: Performs mobility at highest level of function for planned discharge setting  See evaluation for individualized goals  Description: Treatment/Interventions: Functional transfer training, LE strengthening/ROM, Elevations, Therapeutic exercise, Endurance training, Patient/family training, Bed mobility, Gait training, Spoke to nursing, OT          See flowsheet documentation for full assessment, interventions and recommendations  Outcome: Not Progressing  Note: Prognosis: Fair  Problem List: Decreased strength, Decreased endurance, Decreased range of motion, Impaired balance, Decreased mobility, Decreased cognition, Impaired judgement, Decreased safety awareness, Obesity, Pain, Decreased skin integrity  Assessment: Pt seen for PT treatment session this date with interventions consisting of gait training w/ emphasis on improving pt's ability to ambulate level surfaces x 4' fwd/back with min A provided by therapist with RW and Therapeutic exercise consisting of: AROM 10 reps B LE in sitting position  Pt agreeable to PT treatment session upon arrival, pt found seated OOB in chair, in no apparent distress  In comparison to previous session, pt with no improvements as evidenced by pt self limiting this session requiring increased encouragement for participation, ambulated shorter distance this session with increased assistance for all mobility   Post session: pt returned BTB, bed alarm engaged, all needs in reach, and RN notified of session findings/recommendations Continue to recommend STR at time of d/c in order to maximize pt's functional independence and safety w/ mobility  Pt continues to be functioning below baseline level, and remains limited 2* factors listed above and including decreased strength, balance, mobility, and activity tolerance   PT will continue to see pt while here in order to address the deficits listed above and provide interventions consistent w/ POC in effort to achieve STGs  Barriers to Discharge: Inaccessible home environment, Decreased caregiver support  Barriers to Discharge Comments: SK[facility] home, reports family will be away on vacation with limited caregiver support     PT Discharge Recommendation: Post acute rehabilitation services          See flowsheet documentation for full assessment

## 2022-07-11 NOTE — PLAN OF CARE
Problem: OCCUPATIONAL THERAPY ADULT  Goal: Performs self-care activities at highest level of function for planned discharge setting  See evaluation for individualized goals  Description: Treatment Interventions: ADL retraining, Functional transfer training, Endurance training, Cognitive reorientation, Patient/family training          See flowsheet documentation for full assessment, interventions and recommendations  Outcome: Progressing  Note: Limitation: Decreased ADL status, Decreased UE strength, Decreased Safe judgement during ADL, Decreased cognition, Decreased endurance, Decreased high-level ADLs  Prognosis: Fair  Assessment: Pt seen this date for skilled OT session focused on ADLs, functional transfers and mobility, safety education  The patient was received seated on BSC, NAD, PIV access, on RA  She participated in functional ambulation in room, [facility] transfer and toileting, and grooming ADL at sink  Pt required Minimal Assistance for transfer including BSC transfer  Noted poor eccentric control requiring Minimal Assistance for controlled descent  Maximal Assistance for toileting, assist for clothing management and hygiene  Close SBA while standing at sink to wash face while standing with RW  At end of session the patient was located seated in bed side chair with call bell in reach and all needs met  Chair alarm activated  Overall the patient remains below her functional baseline, and is primarily limited at this time due to post op pain, impaired balance, and impaired safety awareness/memory  OT will continue to follow while acute to address POC   At this time, recommend inpatient rehab vs home with [facility] and nearly 24/7 supervision/assist upon d/c      OT Discharge Recommendation: Post acute rehabilitation services (vs home with [facility] and nearly 24/7 supervision/assist)  OT - OK to Discharge:  (once medically cleared)     BRENDA Sanchez/L

## 2022-07-11 NOTE — PLAN OF CARE
Problem: Prexisting or High Potential for Compromised Skin Integrity  Goal: Skin integrity is maintained or improved  Description: INTERVENTIONS:  - Identify patients at risk for skin breakdown  - Assess and monitor skin integrity  - Assess and monitor nutrition and hydration status  - Monitor labs   - Assess for incontinence   - Turn and reposition patient  - Assist with mobility/ambulation  - Relieve pressure over bony prominences  - Avoid friction and shearing  - Provide appropriate hygiene as needed including keeping skin clean and dry  - Evaluate need for skin moisturizer/barrier cream  - Collaborate with interdisciplinary team   - Patient/family teaching  - Consider wound care consult   Outcome: Progressing     Problem: MOBILITY - ADULT  Goal: Maintain or return to baseline ADL function  Description: INTERVENTIONS:  -  Assess patient's ability to carry out ADLs; assess patient's baseline for ADL function and identify physical deficits which impact ability to perform ADLs (bathing, care of mouth/teeth, toileting, grooming, dressing, etc )  - Assess/evaluate cause of self-care deficits   - Assess range of motion  - Assess patient's mobility; develop plan if impaired  - Assess patient's need for assistive devices and provide as appropriate  - Encourage maximum independence but intervene and supervise when necessary  - Involve family in performance of ADLs  - Assess for home care needs following discharge   - Consider OT consult to assist with ADL evaluation and planning for discharge  - Provide patient education as appropriate  Outcome: Progressing     Problem: PAIN - ADULT  Goal: Verbalizes/displays adequate comfort level or baseline comfort level  Description: Interventions:  - Encourage patient to monitor pain and request assistance  - Assess pain using appropriate pain scale  - Administer analgesics based on type and severity of pain and evaluate response  - Implement non-pharmacological measures as appropriate and evaluate response  - Consider cultural and social influences on pain and pain management  - Notify physician/advanced practitioner if interventions unsuccessful or patient reports new pain  Outcome: Progressing     Problem: Knowledge Deficit  Goal: Patient/family/caregiver demonstrates understanding of disease process, treatment plan, medications, and discharge instructions  Description: Complete learning assessment and assess knowledge base    Interventions:  - Provide teaching at level of understanding  - Provide teaching via preferred learning methods  Outcome: Progressing

## 2022-07-11 NOTE — PROGRESS NOTES
Progress Note - Orthopedics   Samanthakiran Pierce 60 y o  female MRN: [id number]  Unit/Bed#: E2 -01 Encounter: [id number]    Assessment:  60 y o  female s/p left total knee arthroplasty POD 6    Plan:  · Pain control prn  · PT/OT- WBAT left LE   · Lovenox/TEDs/SCDs for DVT prophylaxis  Patient will be dc with lovenox x 4 weeks  · Dc planning- Patient may dc home vs rehab if cleared by PT  Subjective: Pt S&E  Patient continues to note pain in the left knee  States she is getting around slightly better than last week  She reports a small bowel movement  Denies distal numbness or tingling  Vitals: Blood pressure 103/72, pulse 89, temperature 97 6 °F (36 4 °C), temperature source Temporal, resp  rate 18, height 5' (1 524 m), weight 80 7 kg (177 lb 14 6 oz), SpO2 100 %, not currently breastfeeding  ,Body mass index is 34 75 kg/m²        Intake/Output Summary (Last 24 hours) at 7/11/2022 1032  Last data filed at 7/11/2022 0750  Gross per 24 hour   Intake --   Output 550 ml   Net -550 ml       Invasive Devices  Report    None                 Ortho Exam: leftLE:  Drsg:  mepilex intact with small area of blood on distal dressing, sensation grossly intact L4, L5, S1, palpable pedal pulse, EHL/AT/GS intact    Lab, Imaging and other studies:   CBC:   No results found for: WBC, HGB, HCT, MCV, PLT, ADJUSTEDWBC, MCH, MCHC, RDW, MPV, NRBC  CMP:   No results found for: SODIUM, CL, CO2, ANIONGAP, BUN, CREATININE, GLUCOSE, CALCIUM, AST, ALT, ALKPHOS, PROT, BILITOT, EGFR

## 2022-07-11 NOTE — PROGRESS NOTES
Outpatient Care Management Note:    Received message from Precy Michael, adult protective services worker and left her contact number to call back to further discuss case  I returned call to her today due to being out of the office Friday afternoon  No answer, message left requesting a call back and my contact number left  Chart reviewed and patient currently inpatient after having left total knee replacement on 7/5/22

## 2022-07-11 NOTE — PHYSICAL THERAPY NOTE
PHYSICAL THERAPY TREATMENT NOTE  NAME:  Samantha Felder  DATE: 07/11/22    Length Of Stay: 6  Performed at least 2 patient identifiers during session: Name and Birthday  Treatment time: 7755-3849  Treatment length: 23 min       07/11/22 1048   Note Type   Note Type Treatment   Pain Assessment   Pain Assessment Tool 0-10   Pain Score 8   Pain Location/Orientation Orientation: Left; Location: Knee   Restrictions/Precautions   Weight Bearing Precautions Per Order Yes   LLE Weight Bearing Per Order WBAT   Other Precautions Cognitive; Chair Alarm; Bed Alarm; Fall Risk;Pain   General   Chart Reviewed Yes   Response to Previous Treatment Patient reporting fatigue but able to participate  Cognition   Overall Cognitive Status Impaired   Following Commands Follows one step commands with increased time or repetition   Bed Mobility   Sit to Supine 4  Minimal assistance   Additional items Assist x 1; Increased time required;Verbal cues;LE management   Additional Comments Pt received seated OOB in chair, requesting to go back to bed at end of session  Min A for LLE management, VC for technique   Transfers   Sit to Stand 4  Minimal assistance   Additional items Assist x 1; Increased time required;Verbal cues   Stand to Sit 4  Minimal assistance   Additional items Assist x 1; Increased time required;Verbal cues   Additional Comments with RW, VC for hand placement and upright posture as pt with increased forward trunk flexion   Ambulation/Elevation   Gait pattern Improper Weight shift;Decreased foot clearance; Antalgic;Decreased L stance; Short stride; Excessively slow   Gait Assistance 4  Minimal assist   Additional items Verbal cues   Assistive Device Rolling walker   Distance 4' fwd/back pt declining further ambulation despite max encouragement and education on benefits of increased ambulation   Balance   Static Sitting Good   Dynamic Sitting Fair   Static Standing Fair -   Dynamic Standing Poor +   Ambulatory Poor +   Endurance Deficit   Endurance Deficit Yes   Endurance Deficit Description pain, self   Activity Tolerance   Activity Tolerance Patient limited by pain   Nurse Made Aware RN [facility]   Exercises   Glute Sets Sitting;10 reps;AROM; Bilateral   Hip Flexion Sitting;10 reps;AROM; Bilateral   Hip Abduction Sitting;10 reps;Bilateral;AROM   Knee AROM Long Arc Quad Sitting;10 reps;AROM; Bilateral   Ankle Pumps Sitting;10 reps;AROM; Bilateral   Assessment   Prognosis Fair   Problem List Decreased strength;Decreased endurance;Decreased range of motion; Impaired balance;Decreased mobility; Decreased cognition; Impaired judgement;Decreased safety awareness; Obesity;Pain;Decreased skin integrity   Barriers to Discharge Inaccessible home environment;Decreased caregiver support   Barriers to Discharge Comments [facility] home, reports family will be away on vacation with limited caregiver support   Goals   PT Treatment Day 8   Plan   Treatment/Interventions Functional transfer training;LE strengthening/ROM; Elevations; Therapeutic exercise; Endurance training;Patient/family training;Equipment eval/education; Bed mobility;Gait training; Compensatory technique education;Spoke to nursing   Progress Slow progress, decreased activity tolerance   PT Frequency 5-7x/wk   Recommendation   PT Discharge Recommendation Post acute rehabilitation services   Equipment Recommended   (TBD by rehab)   AM-PAC Basic Mobility Inpatient   Turning in Bed Without Bedrails 3   Lying on Back to Sitting on Edge of Flat Bed 3   Moving Bed to Chair 3   Standing Up From Chair 3   Walk in Room 3   Climb 3-5 Stairs 1   Basic Mobility Inpatient Raw Score 16   Basic Mobility Standardized Score 38 32   Highest Level Of Mobility   JH-HLM Goal 5: Stand one or more mins   JH-HLM Achieved 5: Stand (1 or more minutes)   Education   Education Provided Mobility training;Home exercise program   Patient Reinforcement needed   End of Consult   Patient Position at End of Consult Supine;Bed/Chair alarm activated; All needs within reach     The patient's AM-PAC Basic Mobility Inpatient Short Form Raw Score is 16  A Raw score of less than or equal to 16 suggests the patient may benefit from discharge to post-acute rehabilitation services  Please also refer to the recommendation of the Physical Therapist for safe discharge planning  Assessment: Pt seen for PT treatment session this date with interventions consisting of gait training w/ emphasis on improving pt's ability to ambulate level surfaces x 4' fwd/back with min A provided by therapist with RW and Therapeutic exercise consisting of: AROM 10 reps B LE in sitting position  Pt agreeable to PT treatment session upon arrival, pt found seated OOB in chair, in no apparent distress  In comparison to previous session, pt with no improvements as evidenced by pt self limiting this session requiring increased encouragement for participation, ambulated shorter distance this session with increased assistance for all mobility   Post session: pt returned BTB, bed alarm engaged, all needs in reach, and RN notified of session findings/recommendations Continue to recommend STR at time of d/c in order to maximize pt's functional independence and safety w/ mobility  Pt continues to be functioning below baseline level, and remains limited 2* factors listed above and including decreased strength, balance, mobility, and activity tolerance  PT will continue to see pt while here in order to address the deficits listed above and provide interventions consistent w/ POC in effort to achieve STGs       Iram Alvarado, PT,DPT

## 2022-07-11 NOTE — OCCUPATIONAL THERAPY NOTE
Occupational Therapy Progress Note     Patient Name: Edrie Mortimer  [facility]'F Date: 7/11/2022    OT Treatment Time:  7813-9874    Problem List  Principal Problem:    Status post total knee replacement, left  Active Problems:    Bipolar II disorder (HCC)    Essential hypertension    Tardive dyskinesia    Mixed hyperlipidemia    Anemia        07/11/22 7668   OT Last Visit   OT Visit Date 07/11/22   Note Type   Note Type Treatment   Restrictions/Precautions   Weight Bearing Precautions Per Order Yes   LLE Weight Bearing Per Order WBAT   Other Precautions Cognitive; Chair Alarm; Bed Alarm; Fall Risk;Pain   Pain Assessment   Pain Assessment Tool 0-10   Pain Score 7   Pain Location/Orientation Orientation: Left; Location: Knee   Pain Onset/Description Onset: Ongoing   Patient's Stated Pain Goal No pain   Multiple Pain Sites No   ADL   Eating Assistance 4  Minimal Assistance   Eating Deficit Setup;Supervision/safety; Increased time to complete;Bringing food to mouth assist  (2/2 tremulousness)   Eating Comments pt spills water from cup with straw when picking up to bring to mouth 2/2 tremulousness  Would benefit from cup with lid   Grooming Assistance 5  Supervision/Setup   Grooming Deficit Setup;Supervision/safety; Increased time to complete;Wash/dry face  (close SBA while standing at sink with RW to wash face)   LB Dressing Assistance 1  Total Assistance   Toileting Assistance  2  Maximal Assistance   Toileting Deficit Setup; Increased time to complete;Supervison/safety; Clothing management up;Clothing management down;Perineal hygiene  (pt attempted to reach for posterior hygiene but was unable)   Functional Standing Tolerance   Time ~5 minutes   Activity static standing, functional ambulation   Comments with RW   Bed Mobility   Additional Comments Pt received seated on BSC   Transfers   Sit to Stand 4  Minimal assistance   Additional items Assist x 1; Armrests; Verbal cues  (with RW)   Stand to Sit 4  Minimal assistance Additional items Assist x 1; Armrests; Verbal cues   Stand pivot 5  Supervision   Additional items Increased time required;Verbal cues  (with RW)   Toilet transfer 4  Minimal assistance   Additional items Assist x 1;Standard toilet;Commode  (grab bar  Trial from [facility] (higher surface), and from standard commode with use of grab bar)   Functional Mobility   Functional Mobility 4  Minimal assistance   Additional Comments with RW   Cognition   Overall Cognitive Status Impaired   Arousal/Participation Alert; Cooperative   Attention Attends with cues to redirect   Orientation Level Oriented to person;Oriented to place;Oriented to situation   Memory Decreased recall of precautions;Decreased recall of recent events;Decreased short term memory   Following Commands Follows one step commands with increased time or repetition   Comments Pleasant and cooperative  Difficulty with problem solving  Decreased safety awareness   Additional Activities   Additional Activities   (Discussed benefits of mobility and rehab, current level of assistance required and if family is able to perform that level of care)   Additional Activities Comments Pt expressed understanding, stated she would call family to discuss   Activity Tolerance   Activity Tolerance Patient limited by pain; Patient limited by fatigue   Medical Staff Made Aware RN   Assessment   Assessment Pt seen this date for skilled OT session focused on ADLs, functional transfers and mobility, safety education  The patient was received seated on BSC, NAD, PIV access, on RA  She participated in functional ambulation in room, [facility] transfer and toileting, and grooming ADL at sink  Pt required Minimal Assistance for transfer including BSC transfer  Noted poor eccentric control requiring Minimal Assistance for controlled descent  Maximal Assistance for toileting, assist for clothing management and hygiene  Close SBA while standing at sink to wash face while standing with RW   At end of session the patient was located seated in bed side chair with call bell in reach and all needs met  Chair alarm activated  Overall the patient remains below her functional baseline, and is primarily limited at this time due to post op pain, impaired balance, and impaired safety awareness/memory  OT will continue to follow while acute to address POC  At this time, recommend inpatient rehab vs home with [facility] and nearly 24/7 supervision/assist upon d/c    Plan   Treatment Interventions ADL retraining;Functional transfer training; Endurance training;Cognitive reorientation;Patient/family training   Goal Expiration Date 07/20/22   OT Treatment Day 1   OT Frequency 3-5x/wk   Recommendation   OT Discharge Recommendation Post acute rehabilitation services  (vs home with [facility] and nearly 24/7 supervision/assist)   OT - OK to Discharge   (once medically cleared)   Additional Comments  The patient's raw score on the AM-PAC Daily Activity inpatient short form is 15, standardized score is 34 69, less than 39 4  Patients at this level are likely to benefit from discharge to post-acute rehabilitation services  Please refer to the recommendation of the Occupational Therapist for safe discharge planning     AM-PAC Daily Activity Inpatient   Lower Body Dressing 2   Bathing 2   Toileting 2   Upper Body Dressing 3   Grooming 3   Eating 3   Daily Activity Raw Score 15   Daily Activity Standardized Score (Calc for Raw Score >=11) 34 69   AM-PAC Applied Cognition Inpatient   Following a Speech/Presentation 3   Understanding Ordinary Conversation 3   Taking Medications 2   Remembering Where Things Are Placed or Put Away 3   Remembering List of 4-5 Errands 2   Taking Care of Complicated Tasks 2   Applied Cognition Raw Score 15   Applied Cognition Standardized Score 33 54       Erlinda Ugarte, OTR/L

## 2022-07-12 ENCOUNTER — HOME HEALTH ADMISSION (OUTPATIENT)
Dept: HOME HEALTH SERVICES | Facility: HOME HEALTHCARE | Age: 57
End: 2022-07-12
Payer: MEDICARE

## 2022-07-12 PROCEDURE — 97116 GAIT TRAINING THERAPY: CPT

## 2022-07-12 PROCEDURE — 99024 POSTOP FOLLOW-UP VISIT: CPT | Performed by: ORTHOPAEDIC SURGERY

## 2022-07-12 PROCEDURE — 97530 THERAPEUTIC ACTIVITIES: CPT

## 2022-07-12 RX ORDER — DIPHENHYDRAMINE HCL 25 MG
25 TABLET ORAL EVERY 8 HOURS PRN
Status: DISCONTINUED | OUTPATIENT
Start: 2022-07-12 | End: 2022-07-13 | Stop reason: HOSPADM

## 2022-07-12 RX ADMIN — POTASSIUM CHLORIDE 10 MEQ: 750 TABLET, EXTENDED RELEASE ORAL at 08:35

## 2022-07-12 RX ADMIN — OXYCODONE HYDROCHLORIDE AND ACETAMINOPHEN 500 MG: 500 TABLET ORAL at 08:35

## 2022-07-12 RX ADMIN — LORAZEPAM 0.5 MG: 0.5 TABLET ORAL at 09:59

## 2022-07-12 RX ADMIN — DOCUSATE SODIUM 100 MG: 100 CAPSULE, LIQUID FILLED ORAL at 08:35

## 2022-07-12 RX ADMIN — QUETIAPINE FUMARATE 50 MG: 25 TABLET ORAL at 21:00

## 2022-07-12 RX ADMIN — LORAZEPAM 0.5 MG: 0.5 TABLET ORAL at 21:01

## 2022-07-12 RX ADMIN — DOCUSATE SODIUM 100 MG: 100 CAPSULE, LIQUID FILLED ORAL at 17:12

## 2022-07-12 RX ADMIN — ACETAMINOPHEN 325MG 975 MG: 325 TABLET ORAL at 05:34

## 2022-07-12 RX ADMIN — BUSPIRONE HYDROCHLORIDE 20 MG: 10 TABLET ORAL at 15:30

## 2022-07-12 RX ADMIN — ACETAMINOPHEN 325MG 975 MG: 325 TABLET ORAL at 21:01

## 2022-07-12 RX ADMIN — FOLIC ACID 1000 MCG: 1 TABLET ORAL at 08:35

## 2022-07-12 RX ADMIN — LITHIUM CARBONATE 300 MG: 300 TABLET, FILM COATED, EXTENDED RELEASE ORAL at 21:00

## 2022-07-12 RX ADMIN — SENNOSIDES 8.6 MG: 8.6 TABLET ORAL at 21:00

## 2022-07-12 RX ADMIN — BENZTROPINE MESYLATE 1 MG: 1 TABLET ORAL at 17:12

## 2022-07-12 RX ADMIN — OXYCODONE HYDROCHLORIDE 10 MG: 10 TABLET ORAL at 04:30

## 2022-07-12 RX ADMIN — DIPHENHYDRAMINE HCL 25 MG: 25 TABLET, COATED ORAL at 17:12

## 2022-07-12 RX ADMIN — ATORVASTATIN CALCIUM 40 MG: 40 TABLET, FILM COATED ORAL at 15:30

## 2022-07-12 RX ADMIN — PANTOPRAZOLE SODIUM 20 MG: 20 TABLET, DELAYED RELEASE ORAL at 05:34

## 2022-07-12 RX ADMIN — PAROXETINE 60 MG: 20 TABLET, FILM COATED ORAL at 08:35

## 2022-07-12 RX ADMIN — BUSPIRONE HYDROCHLORIDE 20 MG: 10 TABLET ORAL at 21:00

## 2022-07-12 RX ADMIN — BENZTROPINE MESYLATE 1 MG: 1 TABLET ORAL at 08:35

## 2022-07-12 RX ADMIN — VALBENAZINE 80 MG: 80 CAPSULE ORAL at 08:34

## 2022-07-12 RX ADMIN — BUSPIRONE HYDROCHLORIDE 20 MG: 10 TABLET ORAL at 08:35

## 2022-07-12 RX ADMIN — ACETAMINOPHEN 325MG 975 MG: 325 TABLET ORAL at 14:13

## 2022-07-12 RX ADMIN — ENOXAPARIN SODIUM 40 MG: 40 INJECTION SUBCUTANEOUS at 08:34

## 2022-07-12 RX ADMIN — PROPRANOLOL HYDROCHLORIDE 10 MG: 20 TABLET ORAL at 11:48

## 2022-07-12 RX ADMIN — AMLODIPINE BESYLATE 10 MG: 10 TABLET ORAL at 08:35

## 2022-07-12 RX ADMIN — OXYCODONE HYDROCHLORIDE 10 MG: 10 TABLET ORAL at 20:27

## 2022-07-12 NOTE — PHYSICAL THERAPY NOTE
Physical Therapy Treatment Note     07/12/22 0926   PT Last Visit   PT Visit Date 07/12/22   Note Type   Note Type Treatment   Pain Assessment   Pain Assessment Tool 0-10   Pain Score 4   Pain Location/Orientation Orientation: Left; Location: Knee   Restrictions/Precautions   Weight Bearing Precautions Per Order Yes   LLE Weight Bearing Per Order WBAT   Other Precautions Chair Alarm; Bed Alarm;WBS;Fall Risk;Pain   General   Chart Reviewed Yes   Family/Caregiver Present No   Subjective   Subjective Pt  seated on chair upon entry  Agreeable to PT  CM talked to daughter on patient's phone  Pt  noted with tremors in hr UEs hence assisted her calling her daughter,   Bed Mobility   Sit to Supine 5  Supervision   Additional items Assist x 1;HOB elevated; Bedrails   Transfers   Sit to Stand 5  Supervision   Additional items Armrests; Increased time required   Stand to Sit 5  Supervision   Additional items Armrests; Increased time required   Stand pivot 5  Supervision   Additional items Armrests; Increased time required   Toilet transfer   (S for stand to Sit and Mn A x 1 for sit to stand)   Ambulation/Elevation   Gait pattern Improper Weight shift;Decreased L stance; Short stride; Excessively slow; Foward flexed; Inconsistent zack;Decreased heel strike;Decreased toe off   Gait Assistance   (CS)   Additional items Verbal cues   Assistive Device Rolling walker   Distance 12ft x 3,16ft, 18ft, 36ft   Stair Management Assistance   (CS)   Additional items Assist x 1;Verbal cues   Stair Management Technique Two rails; Step to pattern; Foreward;Nonreciprocal   Number of Stairs 5   Balance   Static Sitting Normal   Dynamic Sitting Fair +   Static Standing Fair   Dynamic Standing Fair -   Ambulatory Fair -   Endurance Deficit   Endurance Deficit No   Activity Tolerance   Activity Tolerance Patient tolerated treatment well   Nurse Made Aware Yes   Assessment   Prognosis Fair   Problem List Decreased strength;Decreased range of motion;Decreased mobility; Decreased coordination; Impaired judgement;Pain;Orthopedic restrictions; Obesity   Assessment Pt  progressing well with mobility  Pt  continues to require encouragement t participate in increased mobility as patient is self limiting  Pt  reported "because i dont want to do it"  Pt  also reported she wants to go home today as she and her daughter needs to take care of some stuff  pt  able to ambulate and negotiate steps with CS  Cues for LE sequencing given for stair negtoiation  Pt  noted to be anxious post stair negotiation hence seated rest was recommended  pt  reported I dont think I got my anxiety medication  Reported the same to RN  pt  needed Min A x 1 for sit to stand transfer from toilet and all other transfers performed with S  No instability or LOB noted t/o session  pt  noted to habe habitual trunk flexion forward during ambulation  Pt  needed cues and occ  assisted to place RW slightly ahead of her to take longer strides during ambulation  No incidents noted wih stair negotiation  pt  able to perform pericare with S  Pt  needed no hands on assist for sit to supine transfer however she used the bedrail and HOB slightly elevated  Discussed with PT Jenaro regarding patient's mobility progress and agreeable pt  is appropriate for DC home with Home PT and family support  pt's son in law is patient's paid care giver  HUNTER was also informed of the same  Pt  reported she would like to go home today and it was repiorted to RN and HUNTER  Pt  was coopertaive with encouragement for improving her mobility  Continue per PT POC  Seated rest on WC after all ambulation  Barriers to Discharge None   Goals   Patient Goals I want to go home today   STG Expiration Date 07/13/22   PT Treatment Day 9   Plan   Treatment/Interventions Functional transfer training;Elevations; Patient/family training;Bed mobility;Gait training;Equipment eval/education;Spoke to nursing;Spoke to case management  (PT)   Progress Progressing toward goals   PT Frequency 5-7x/wk   Recommendation   PT Discharge Recommendation Home with home health rehabilitation  (and family support)   Equipment Recommended [place] walker   [address] Mobility Inpatient   Turning in Bed Without Bedrails 3   Lying on Back to Sitting on Edge of Flat Bed 3   Moving Bed to Chair 4   Standing Up From Chair 4   Walk in Room 4   Climb 3-5 Stairs 4   Basic Mobility Inpatient Raw Score 22   Basic Mobility Standardized Score 47 4   Highest Level Of Mobility   JH-HLM Goal 7: Walk 25 feet or more   JH-HLM Achieved 7: Walk 25 feet or more   End of Consult   Patient Position at End of Consult Supine;Bed/Chair alarm activated; All needs within reach       Aleta Sails, PTA    An AM-PAC basic mobility standardized score less than 42 9 suggest the patient may benefit from discharge to post-acute rehab services

## 2022-07-12 NOTE — PROGRESS NOTES
Progress Note - Orthopedics   Samantha Miles Mustache 60 y o  female MRN: [id number]  Unit/Bed#: E2 -01 Encounter: [id number]    Assessment:  60 y o  female s/p left total knee arthroplasty POD 7    Plan:  Pain control prn  PT/OT- WBAT left LE   Lovenox/TEDs/SCDs for DVT prophylaxis  D/c planning- I spoke with patient's daughter  She said she spoke with someone earlier in the day and the plan is for Samantha to go home tomorrow  She will be there to pick her up at 11am       Subjective: Pt S&E  Resting comfortably  Vitals: Blood pressure 130/63, pulse 87, temperature 97 7 °F (36 5 °C), temperature source Temporal, resp  rate 16, height 5' (1 524 m), weight 80 7 kg (177 lb 14 6 oz), SpO2 100 %, not currently breastfeeding  ,Body mass index is 34 75 kg/m²        Intake/Output Summary (Last 24 hours) at 7/12/2022 1358  Last data filed at 7/11/2022 1716  Gross per 24 hour   Intake --   Output 400 ml   Net -400 ml       Invasive Devices  Report    None                 Ortho Exam: Vicky Loredo:  Drsg:  C/D/I, sensation grossly intact L4, L5, S1, palpable pedal pulse, EHL/AT/GS intact

## 2022-07-12 NOTE — PLAN OF CARE
Problem: PHYSICAL THERAPY ADULT  Goal: Performs mobility at highest level of function for planned discharge setting  See evaluation for individualized goals  Description: Treatment/Interventions: Functional transfer training, LE strengthening/ROM, Elevations, Therapeutic exercise, Endurance training, Patient/family training, Bed mobility, Gait training, Spoke to nursing, OT          See flowsheet documentation for full assessment, interventions and recommendations  Outcome: Progressing  Note: Prognosis: Fair  Problem List: Decreased strength, Decreased range of motion, Decreased mobility, Decreased coordination, Impaired judgement, Pain, Orthopedic restrictions, Obesity  Assessment: Pt  progressing well with mobility  Pt  continues to require encouragement t participate in increased mobility as patient is self limiting  Pt  reported "because i dont want to do it"  Pt  also reported she wants to go home today as she and her daughter needs to take care of some stuff  pt  able to ambulate and negotiate steps with CS  Cues for LE sequencing given for stair negtoiation  Pt  noted to be anxious post stair negotiation hence seated rest was recommended  pt  reported I dont think I got my anxiety medication  Reported the same to RN  pt  needed Min A x 1 for sit to stand transfer from toilet and all other transfers performed with S  No instability or LOB noted t/o session  pt  noted to habe habitual trunk flexion forward during ambulation  Pt  needed cues and occ  assisted to place RW slightly ahead of her to take longer strides during ambulation  No incidents noted wih stair negotiation  pt  able to perform pericare with S  Pt  needed no hands on assist for sit to supine transfer however she used the bedrail and HOB slightly elevated  Discussed with PT Jenaro regarding patient's mobility progress and agreeable pt  is appropriate for DC home with Home PT and family support  pt's son in law is patient's paid care giver   HUNTER was also informed of the same  Pt  reported she would like to go home today and it was repiorted to RN and CM  Pt  was coopertaive with encouragement for improving her mobility  Continue per PT POC  Seated rest on WC after all ambulation  Barriers to Discharge: None  Barriers to Discharge Comments: [facility] home, reports family will be away on vacation with limited caregiver support  PT Discharge Recommendation: Home with home health rehabilitation (and family support)    See flowsheet documentation for full assessment

## 2022-07-13 VITALS
RESPIRATION RATE: 19 BRPM | WEIGHT: 177.91 LBS | DIASTOLIC BLOOD PRESSURE: 72 MMHG | BODY MASS INDEX: 34.93 KG/M2 | HEART RATE: 95 BPM | HEIGHT: 60 IN | OXYGEN SATURATION: 97 % | SYSTOLIC BLOOD PRESSURE: 116 MMHG | TEMPERATURE: 97.9 F

## 2022-07-13 PROCEDURE — 99024 POSTOP FOLLOW-UP VISIT: CPT | Performed by: PHYSICIAN ASSISTANT

## 2022-07-13 PROCEDURE — NC001 PR NO CHARGE: Performed by: PHYSICIAN ASSISTANT

## 2022-07-13 RX ORDER — METHOCARBAMOL 500 MG/1
500 TABLET, FILM COATED ORAL 3 TIMES DAILY PRN
Qty: 21 TABLET | Refills: 0 | Status: SHIPPED | OUTPATIENT
Start: 2022-07-13 | End: 2022-08-31 | Stop reason: ALTCHOICE

## 2022-07-13 RX ADMIN — FOLIC ACID 1000 MCG: 1 TABLET ORAL at 09:10

## 2022-07-13 RX ADMIN — PROPRANOLOL HYDROCHLORIDE 10 MG: 20 TABLET ORAL at 07:58

## 2022-07-13 RX ADMIN — ENOXAPARIN SODIUM 40 MG: 40 INJECTION SUBCUTANEOUS at 09:12

## 2022-07-13 RX ADMIN — ACETAMINOPHEN 325MG 975 MG: 325 TABLET ORAL at 05:07

## 2022-07-13 RX ADMIN — AMLODIPINE BESYLATE 10 MG: 10 TABLET ORAL at 09:10

## 2022-07-13 RX ADMIN — PANTOPRAZOLE SODIUM 20 MG: 20 TABLET, DELAYED RELEASE ORAL at 05:07

## 2022-07-13 RX ADMIN — POTASSIUM CHLORIDE 10 MEQ: 750 TABLET, EXTENDED RELEASE ORAL at 09:10

## 2022-07-13 RX ADMIN — DOCUSATE SODIUM 100 MG: 100 CAPSULE, LIQUID FILLED ORAL at 09:09

## 2022-07-13 RX ADMIN — OXYCODONE HYDROCHLORIDE 10 MG: 10 TABLET ORAL at 05:07

## 2022-07-13 RX ADMIN — OXYCODONE HYDROCHLORIDE 10 MG: 10 TABLET ORAL at 10:50

## 2022-07-13 RX ADMIN — PAROXETINE 60 MG: 20 TABLET, FILM COATED ORAL at 09:08

## 2022-07-13 RX ADMIN — VALBENAZINE 80 MG: 80 CAPSULE ORAL at 09:14

## 2022-07-13 RX ADMIN — BUSPIRONE HYDROCHLORIDE 20 MG: 10 TABLET ORAL at 09:11

## 2022-07-13 RX ADMIN — OXYCODONE HYDROCHLORIDE AND ACETAMINOPHEN 500 MG: 500 TABLET ORAL at 09:10

## 2022-07-13 RX ADMIN — BENZTROPINE MESYLATE 1 MG: 1 TABLET ORAL at 09:09

## 2022-07-13 NOTE — UTILIZATION REVIEW
Notification of Discharge   This is a Notification of Discharge from our facility [address]  Please be advised that this patient has been discharge from our facility  Below you will find the admission and discharge date and time including the patients disposition  UTILIZATION REVIEW CONTACT:  Mitsi Guajardo  Utilization   Network Utilization Review Department  Phone: [phone] x carefully listen to the prompts  All voicemails are confidential   Email: [email or web address]     PHYSICIAN ADVISORY SERVICES:  FOR EEIW-CO-TPOS REVIEW - MEDICAL NECESSITY DENIAL  Phone: [phone]  Fax: [phone]  Email: [email or web address]     PRESENTATION DATE: 7/5/2022  8:04 AM    INPATIENT ADMISSION DATE: 7/5/22  2:33 PM   DISCHARGE DATE: 7/13/2022 12:10 PM  DISPOSITION: Home/Self Care Home/Self Care      IMPORTANT INFORMATION:  Send all requests for admission clinical reviews, approved or denied determinations and any other requests to dedicated fax number below belonging to the campus where the patient is receiving treatment   List of dedicated fax numbers:  [address] DENIALS (Administrative/Medical Necessity) [phone]   [address] (Maternity/NICU/Pediatrics) [phone]   [facility] [phone]   [address] [place] [address] [phone]   [address] [place] [address] [phone]   2000 [facility] [address] [place] [address],[address] [phone]   [facility]  [phone]   2205 [facility] [address]  2401 [facility] [address] [place] [phone]

## 2022-07-13 NOTE — PHYSICAL THERAPY NOTE
Physical Therapy Cancellation Note    PT session cancelled as patient refused reporting " I am doing no therapy today  My daughter was a CNA once and we can do things at home"  Pt  Possible DC home today

## 2022-07-13 NOTE — DISCHARGE SUMMARY
Admission Diagnosis:  Primary osteoarthritis of the left knee  D/C Diagnosis:  Primary osteoarthritis of the left knee  Procedure:  Left total knee arthroplasty  Date of sugery: 7/5/2022  Admission Dates: 7/5/2022- 7/13/2022      Samantha Gould presented to the hospital on 7/5/2022 with Left knee arthritis for a total knee arthroplasty  She underwent the procedure that same day and the details of the procedure can be found in the operative note  Post-operatively She was placed on a pain and bowel regimen  She was made WBAT of the Left lower extremity and consults were placed to physical therapy and occupational therapy as well as internal medicine who all saw her throughout her admission  She was started on Lovenox and sequential compression as well as ANTIONE stockings were used for DVT prophylaxis  She received 24 hours of antibiotic prophylaxis  On POD 1 labs were checked  Her dressing was found to be clean, dry, and intact  She worked with physical therapy  On POD 2 labs were checked  Her incision was found to be clean, dry, and intact with steri strips  She worked with physical therapy  On POD 3 labs were checked  Dressing was found to be clean, dry, and intact  She continued to work with physical therapy and make progress  Over the next several days, patient remained medically stable  Her labs and vitals were monitored  On POD8, patient was found to be safe and stable for discharge to Home  Discharge instructions were provided as well as needed prescriptions        Results from last 7 days   Lab Units 07/10/22  0501 07/09/22  0804 07/08/22  0503   WBC Thousand/uL 7 62 8 13 8 03   HEMOGLOBIN g/dL 8 2* 8 5* 8 4*   HEMATOCRIT % 25 9* 27 1* 26 3*   PLATELETS Thousands/uL 315 289 285         Results from last 7 days   Lab Units 07/08/22  0503 07/07/22  0505   POTASSIUM mmol/L 3 7 3 2*   CHLORIDE mmol/L 104 102   CO2 mmol/L 30 26   BUN mg/dL 5 10   CREATININE mg/dL 0 66 0 75   CALCIUM mg/dL 8 1* 7 8*

## 2022-07-13 NOTE — CASE MANAGEMENT
Case Management Discharge Planning Note    Patient name Zain Obregon  Location [address] [place] [address] /E2 -* MRN [id number]  : 1965 Date 2022       Current Admission Date: 2022  Current Admission Diagnosis:Status post total knee replacement, left   Patient Active Problem List    Diagnosis Date Noted    Anemia 2022    Status post total knee replacement, left 2022    Seizure-like activity ([facility] ) 2022    Hypokalemia 2022    Constipation 03/15/2022    S/P total knee arthroplasty, right 03/10/2022    CVA (cerebral vascular accident) ([facility] ) 2022    Preoperative evaluation to rule out surgical contraindication 2022    Leukopenia 2021    Mixed hyperlipidemia 2021    Medical clearance for psychiatric admission 2021    History of CVA (cerebrovascular accident) 2021    Encephalopathy 2021    Nausea 2021    Multiple closed fractures of metatarsal bone of right foot 2021    Chronic back pain 2021    Arthritis of right knee 10/06/2020    Dysphagia 2020    Ambulatory dysfunction 2020    Status post insertion of spinal cord stimulator 2020    Superficial bacterial infection of skin 2020    Scar of back 2020    Impaired skin integrity associated with surgical incision 2020    Rash 2020    Primary osteoarthritis of both knees 2020    Patellofemoral disorder of both knees 2020    Tardive dyskinesia 2020    MIMI (obstructive sleep apnea)     Complaint related to dreams 2020    Family history of colorectal cancer 2019    Encounter for long-term use of opiate analgesic 2019    Post laminectomy syndrome 10/07/2019    Lumbar disc disease with radiculopathy 2018    Spinal stenosis of lumbar region with neurogenic claudication 2018    Lumbar radiculopathy 2017    Bilateral hip pain 2017    Primary localized osteoarthritis of both knees 06/16/2017    Chronic pain disorder 02/28/2017    Opioid dependence ([facility] ) 02/28/2017    Sacroiliitis ([facility] ) 02/28/2017    Lumbar spondylosis 10/31/2016    Osteoarthritis of both hips 10/31/2016    Generalized anxiety disorder 10/26/2016    Major depressive disorder, recurrent episode, moderate degree ([facility] ) 09/08/2016    Right knee pain 06/06/2016    Recent unexplained weight loss 06/06/2016    Fatigue 10/26/2015    Bipolar II disorder ([facility] ) 06/18/2015    Panic disorder without agoraphobia 06/18/2015    Post traumatic stress disorder 06/18/2015    Left hip pain 07/25/2014    Intractable low back pain 06/16/2014    Tremor 06/12/2014    Migraine 05/27/2014    Esophageal reflux 05/01/2014    Cognitive disorder 04/18/2014    Female pelvic pain 10/30/2013    Pain in joint, shoulder region 10/28/2013    Overactive bladder 09/26/2013    Osteoarthritis of knee 02/20/2013    Insomnia 01/31/2013    History of hypokalemia 01/03/2013    Urinary incontinence 09/24/2012    Vitamin D deficiency 09/18/2012    Fibromyalgia 09/14/2012    Essential hypertension 09/14/2012      LOS (days): 8  Geometric Mean LOS (GMLOS) (days):   Days to GMLOS:     OBJECTIVE:  Risk of Unplanned Readmission Score: 36 91         Current admission status: Inpatient   Preferred Pharmacy:   [address] [place] [address], [facility] [address]  [address]   [address] [place] [address]  Phone: [phone] Fax: [phone]    [facility]/pharmacy #[id number]- SHAKILA Dumont - [facility]  [address]  2001 [facility] [address]  Phone: [phone] Fax: [facility], [address]  [address] [facility]  1632 [facility] [address]  Phone: [phone] Fax: [phone] 50[address] [facility],[address], [address]  [address] [address]  Phone: [phone] Fax: [address] [place] [address] [facility] [address] [facility]  [address] [place] [address]  Phone: [phone] Fax: [phone]    Primary Care Provider: Cy Escalera DO    Primary Insurance: [facility],4Th [facility]  Secondary Insurance: Dayami Borjas    DISCHARGE DETAILS:    Discharge planning discussed with[de-identified] Patient and daughter  Freedom of Choice: Yes  Comments - Freedom of Choice: Agreeable to [facility] 1st choice  Able to accept  CM contacted family/caregiver?: Yes  Were Treatment Team discharge recommendations reviewed with patient/caregiver?: Yes  Did patient/caregiver verbalize understanding of patient care needs?: Yes  Were patient/caregiver advised of the risks associated with not following Treatment Team discharge recommendations?: Yes    Contacts  Patient Contacts: Tunde  Relationship to Patient[de-identified] Family  Contact Method:  In Person  Reason/Outcome: Discharge [address]         Is the patient interested in [facility] at discharge?: Yes  [address] requested[de-identified] Occupational Therapy, Physical [address] Name[de-identified] [address] [facility] Provider[de-identified] PCP  Home Health Services Needed[de-identified] Evaluate Functional Status and Safety, Gait/ADL Training, Strengthening/Theraputic Exercises to Improve Function  Homebound Criteria Met[de-identified] Requires the Assistance of Another Person for Safe Ambulation or to Leave the Home, Uses an Assist Device (i e  cane, walker, etc)  Supporting Clincal Findings[de-identified] Limited Endurance, Fatigues Easliy in [employer]    DME Referral Provided  Referral made for DME?: No    Other Referral/Resources/Interventions Provided:  Interventions: [facility]         Treatment Team Recommendation: Home with [address]  Discharge Destination Plan[de-identified] Home with [place] at Discharge : Automobile, Family                       Accompanied by: Family member     IMM Given (Date):: 07/13/22  IMM Given to[de-identified] Patient     Additional Comments: CM met with pt and daughter  Both parties are agreeable to a discharge home with [facility] is the 1st choice and they are able to accept  IMM was reviewed  IMM signed and pt agreeable with discharge today  Family to provide transport home

## 2022-07-13 NOTE — PROGRESS NOTES
Progress Note - Orthopedics   Samantha QUINONEZ Lupis Moreno 60 y o  female MRN: [id number]  Unit/Bed#: E2 -01 Encounter: [id number]    Assessment:  60 y o  female s/p left total knee arthroplasty POD 8    Plan:  · Dressing changes morning incision appears to be healing well  Patient should continue daily dry dressing changes and was educated to not get her incision wet  · Pain control prn  · PT/OT- WBAT left LE   · Lovenox/TEDs/SCDs for DVT prophylaxis  Patient will be dc with lovenox x 4 weeks  · Dc planning- discharge home today with home health PT  Subjective: Pt S&E  Patient is putting on her clothes this morning in preparation to leave the hospital   She states she is ready to go home  She notes persistent pain in the left knee  Patient states she had a bowel movement  Denies fevers, chills, distal numbness, or tingling  Vitals: Blood pressure 137/80, pulse 63, temperature 97 9 °F (36 6 °C), temperature source Temporal, resp  rate 19, height 5' (1 524 m), weight 80 7 kg (177 lb 14 6 oz), SpO2 98 %, not currently breastfeeding  ,Body mass index is 34 75 kg/m²      No intake or output data in the 24 hours ending 07/13/22 0752    Invasive Devices  Report    None                 Ortho Exam: Julio César Langston:  Drsg:  mepilex removed, incision healing well without any drainage or erythema, sensation grossly intact L4, L5, S1, palpable pedal pulse, EHL/AT/GS intact    Lab, Imaging and other studies:   CBC:   No results found for: WBC, HGB, HCT, MCV, PLT, ADJUSTEDWBC, MCH, MCHC, RDW, MPV, NRBC  CMP:   No results found for: SODIUM, CL, CO2, ANIONGAP, BUN, CREATININE, GLUCOSE, CALCIUM, AST, ALT, ALKPHOS, PROT, BILITOT, EGFR

## 2022-07-14 ENCOUNTER — PATIENT OUTREACH (OUTPATIENT)
Dept: INTERNAL MEDICINE CLINIC | Facility: CLINIC | Age: 57
End: 2022-07-14

## 2022-07-14 ENCOUNTER — TRANSITIONAL CARE MANAGEMENT (OUTPATIENT)
Dept: INTERNAL MEDICINE CLINIC | Facility: CLINIC | Age: 57
End: 2022-07-14

## 2022-07-14 ENCOUNTER — HOME CARE VISIT (OUTPATIENT)
Dept: HOME HEALTH SERVICES | Facility: HOME HEALTHCARE | Age: 57
End: 2022-07-14

## 2022-07-14 NOTE — PROGRESS NOTES
Outpatient Care Management Note:    Received ADT alert that patient was discharged from hospital  Patient was at [facility] from 7/5-7/13/22 for s/p left total knee replacement  Patient was discharged home with in home PT/OT [facility]  Patient discharged on Lovenox, Robaxin 500 mg three times a day as needed, Colace 100 mg twice a day  I called patient at [phone] and rang twice and went to voicemail  Message left this is the nurse Nadeen Gowers calling with her PCP office to follow up with her now that she is home from the hospital  I stated I was called to see how she is managing at home, managing with medication, and if she has any questions of concerns at this time  I did encourage her to follow up with ortho and encouraged in home PT/OT  I left my contact number [phone] and requested a call back  1st attempt  Per chart review patient had ortho appointment today 7/14 @ 9:30 am  Does not look like patient went to appointment per chart review  , Mehdi Hall and I placed call to Samina Bhatia, APS worker at [phone] and had to leave message requesting a call back and both of our contact numbers left  We have left several messages since 7/6/22

## 2022-07-15 ENCOUNTER — HOME CARE VISIT (OUTPATIENT)
Dept: HOME HEALTH SERVICES | Facility: HOME HEALTHCARE | Age: 57
End: 2022-07-15
Payer: MEDICARE

## 2022-07-15 ENCOUNTER — PATIENT OUTREACH (OUTPATIENT)
Dept: INTERNAL MEDICINE CLINIC | Facility: CLINIC | Age: 57
End: 2022-07-15

## 2022-07-15 ENCOUNTER — TELEPHONE (OUTPATIENT)
Dept: OBGYN CLINIC | Facility: HOSPITAL | Age: 57
End: 2022-07-15

## 2022-07-15 VITALS — OXYGEN SATURATION: 100 % | SYSTOLIC BLOOD PRESSURE: 110 MMHG | DIASTOLIC BLOOD PRESSURE: 72 MMHG | HEART RATE: 100 BPM

## 2022-07-15 PROCEDURE — G0151 HHCP-SERV OF PT,EA 15 MIN: HCPCS

## 2022-07-15 PROCEDURE — 400013 VN SOC

## 2022-07-15 NOTE — PROGRESS NOTES
I called [facility] pharmacy and they confirm Lovenox injections and Robaxin are ready to be picked up

## 2022-07-15 NOTE — PROGRESS NOTES
Outpatient Care Management Note:    Received ADT alert that patient was discharged from hospital  Patient was at [facility] from 7/5-7/13/22 for s/p left total knee replacement  Patient was discharged home with in home PT/OT [facility]  Patient discharged on Lovenox, Robaxin 500 mg three times a day as needed, Colace 100 mg twice a day       I called patient at [phone]  2nd attempt to reach  I was able to speak with patient  She reports she is doing ok but reports had not been able to do much due to her pain  Patient reports she is getting the rest of her medication from pharmacy today  I did review with her she should have Lovenox, Robaxin and colace there  Patient reports she has been taking Tylenol as needed for pain  I did ask if she has oxycodone and it was unclear if she did  I did instruct patient she would need to reach out to ortho office to further discuss her pain if needed  Patient reports she did not go to her ortho office today because the family's car was broken down  I encouraged patient to call and reschedule and that ortho navigator reached out to her this morning and left message  Patient reports in home PT will be coming today to work with her  I strongly encouraged ongoing visit  I did offer patient an appointment with PCP office as we have been trying to have patient come into office with all her medication to have a medication reconciliation and to have a family member present with her  Patient was not agreeable to bringing her medication into office  I heard patient talking to someone who she reports it is her daughter and then put her on the phone  I spoke with patient's daughter Nasima Dickerson  She reports she is 1 or 6 children and patient lives with her and has been caring for her for the past 11 years along with patient's son Rahel Collins  She reports her  Bertha People is also patient's paid caregiver  Daughter has 11 young children  Daughter works and owns a business  She reports APS was in the home recently which she believes how her mom presented to a recent appointment she had  Daughter reports her mom has not been telling them when she has an appointment and does not say anything until the transportation with her insurance is at the front door  Daughter reports they have been wanting to get more involved in her care but patient does not always allow it and will try to hide information from them  She reports with ortho appointment yesterday daughter confirms car was not broken down but was waiting for money for gas  I did review with her that patient has transportation with her insurance and can have someone ride with her  Patient aware they do need 3 business days advance notice  Daughter reports her /paid caregiver can and would go with her when needed  Daughter and family live in [place] and son Zamzam Gongora lives in [place]  Per daughter she and her family will be away in [place] from Friday 7/22 and come back Monday 7/25  Patient will be staying with her son Zamzam Gongora      Patient scheduled Monday 7/25 @ 2 pm with Dr Jaret Grayson for TCM appointment  Daughter will speak with her brother Zamzam Gongora to bring all medication with them to appointment and Zamzam Gongora will be with patient and has come to PCP office in the past with patient  Daughter did report that son Zamzam Gongora is more familiar with patient's medications than she is but shared she wants to become more familiar with her moms medications  Phone number for daughter Flako Power is [phone]  I did give daughter my contact number and encouraged to reach out with further questions or concerns  They will help patient get medication from pharmacy  I did review with her Lovenox injections which daughter reports she knows of and is familiar with on how to give  She will help patient reschedule Ortho appointment  Patient reports she has phone number   I strongly encouraged patient to allow her family to be involved in her care and come with her to appointments  Family is assisting patient with ADL's  Patient and daughter do not have any further questions, concerns, or other needs at this time  They have my contact # and PCP office # if needed  Pt is agreeable for further outreach

## 2022-07-18 ENCOUNTER — HOME CARE VISIT (OUTPATIENT)
Dept: HOME HEALTH SERVICES | Facility: HOME HEALTHCARE | Age: 57
End: 2022-07-18
Payer: MEDICARE

## 2022-07-18 NOTE — CASE COMMUNICATION
TC to pt daughter to schedule snv for today  daughter reports pt is not feeling well and does not want visit today  would like next snv wednesday 7/20/22

## 2022-07-20 ENCOUNTER — HOME CARE VISIT (OUTPATIENT)
Dept: HOME HEALTH SERVICES | Facility: HOME HEALTHCARE | Age: 57
End: 2022-07-20
Payer: MEDICARE

## 2022-07-20 NOTE — CASE COMMUNICATION
Second Missed appointment/ No Show   Samantha's daughter canceled PT appointment on Monday  Next appointment set up for 10 am on Wednesday 7/20  Arrive at scheduled time  No answer at the door and not answer on the three telephone numbers listed  Unable to leave a VM on her daughter's phone or Samantha's phone  SN stopped by at 1:30 pm with no answer and unable to get ahold of anyone via telephone  Call to Samantha's son, Sukhdeep Quick  No answer  Lef t VM that his mother missed two PT appointments and an RN appointment  Informed him we were unable to leave voicemail on his sister's or mother's phone  Stated in VM that we will be discharging his mother from home health services if we do not hear back by the morning of 7/21 and that she will need to transition to outpatient physical therapy

## 2022-07-20 NOTE — CASE COMMUNICATION
Telephone call made to dtr on 7 18 22 to schedule OT evaluation  Unable to leave voicemail  No return phone call  Will attempt to call pt again later in the week

## 2022-07-21 ENCOUNTER — HOME CARE VISIT (OUTPATIENT)
Dept: HOME HEALTH SERVICES | Facility: HOME HEALTHCARE | Age: 57
End: 2022-07-21
Payer: MEDICARE

## 2022-07-21 ENCOUNTER — PATIENT OUTREACH (OUTPATIENT)
Dept: INTERNAL MEDICINE CLINIC | Facility: CLINIC | Age: 57
End: 2022-07-21

## 2022-07-21 NOTE — PROGRESS NOTES
Outpatient Care Management Note:    I called patient's son Dhaval Burgos  No answer and message left this is the nurse Liss Fernandez calling with his mom's PCP office to remind him that his mom has an appointment on Monday 7/25 @ 2 pm and to bring ALL medication including any over the counter, mental health, and other prescribed medication with as we have been trying to do an extensive medication review  I did mention that I spoke with patient's daughter last week and she had said patient would be staying with him and would bring her to appointment  I stated I would follow up with them further on Monday at the office and left PCP office number if needed  [facility] but has had canceled PT appointments and OT not able to reach patient and skilled nursing no show visit  They may be discharged patient from services and awaiting to hear back from family  No ortho appointment scheduled yet per chart review

## 2022-07-21 NOTE — CASE COMMUNICATION
TC night before to schedule SN visit  Unable to speak nor leave message on daughters phone as voice mail full  No answer to tc to all the other phone numbers in profile  Able to leave Vm on home number with propsed time for Sn vs  Arrive at said time no anwer at the door nor to tc to all the numbers in profile  Team CC and  made aware

## 2022-07-22 ENCOUNTER — TELEPHONE (OUTPATIENT)
Dept: OBGYN CLINIC | Facility: HOSPITAL | Age: 57
End: 2022-07-22

## 2022-07-22 ENCOUNTER — HOME CARE VISIT (OUTPATIENT)
Dept: HOME HEALTH SERVICES | Facility: HOME HEALTHCARE | Age: 57
End: 2022-07-22
Payer: MEDICARE

## 2022-07-22 NOTE — TELEPHONE ENCOUNTER
Pt sees Dr Layne Webb from [facility] is calling stating that Samantha was not see for home health visits this week due to not being able to contact the family  Pt son is req a referral for outpatient therapy instead of home health   Please put in the referral so Sunita Sánchez can let the family know    #[phone](Inna)

## 2022-07-22 NOTE — CASE COMMUNICATION
Multiple attempts were made to schedule PT visits following SOC  Multiple calls to Samantha's number, Tunde's number (daughter), and Mick's number (son)  OT and SN also made multiple attempts to contact patient  Unable to schedule follow up visits due to inability to contact  Eventually left VM informing them she would be discharged from [facility] if we did not receive a return call by end of the day 7/22 by 4:30 pm  Received return dayne l from son on 7/22 and he left this therapist a   Family is  requesting outpatient physical therapy as it will work better for their work schedules  Call to Dr Samanta Young office and spoke with Diane Mcmahan  Requested referral be placed for OP PT   Martha to send message to Dr Saige Nick

## 2022-07-25 ENCOUNTER — TELEPHONE (OUTPATIENT)
Dept: OBGYN CLINIC | Facility: MEDICAL CENTER | Age: 57
End: 2022-07-25

## 2022-07-25 DIAGNOSIS — Z96.651 STATUS POST TOTAL KNEE REPLACEMENT, RIGHT: Primary | ICD-10-CM

## 2022-07-25 DIAGNOSIS — Z96.652 STATUS POST TOTAL KNEE REPLACEMENT, LEFT: Primary | ICD-10-CM

## 2022-07-25 RX ORDER — OXYCODONE HYDROCHLORIDE 5 MG/1
5 TABLET ORAL EVERY 6 HOURS PRN
Qty: 28 TABLET | Refills: 0 | Status: SHIPPED | OUTPATIENT
Start: 2022-07-25 | End: 2022-08-03 | Stop reason: SDUPTHER

## 2022-07-25 NOTE — TELEPHONE ENCOUNTER
Spoke with patient  Refill provided for oxycodone 5 mg Q6 prn x 1 week  Patient aware to call back next week if she requires refill  I also let her know that she will be doing outpatient PT and I gave new referrals for that  No other questions at this time

## 2022-07-25 NOTE — TELEPHONE ENCOUNTER
Patient sees Dr Leo Doran  Patient is requesting a script for Oxycodone for her left knee pain, she needs it to be sent to [facility] [address] [address] in [place]  Please call once it has been sent      CB # [phone]

## 2022-07-27 NOTE — ASSESSMENT & PLAN NOTE
Bowel regimen
Continue Bowel regimen
Continue Bowel regimen
Continue Lyrica  Valbenazine not on formulary  Family notified to bring medication in
Continue Lyrica  Valbenazine not on formulary  Will ask psychiatry to evaluate for med adjustment given worsening tardive dyskinesia
Continue Lyrica  Valbenazine not on formulary, Family notified to bring medication in  Discussed with psychiatric pharmacist, recommending clonazepam 0 5 mg b i d  P r n 
Continue Lyrica  Valbenazine not on formulary, Family was unable to find it at home  [facility]  [facility]  Should arrive today  Discussed with psychiatric pharmacist, recommending clonazepam 0 5 mg b i d  P r n 
Continue Lyrica  Valbenazine not on formulary, Family was unable to find it at home  [facility] who stated that pt never filled this medication  [facility]  Should arrive tomorrow  Discussed with psychiatric pharmacist, recommending clonazepam 0 5 mg b i d  P r n 
Continue Lyrica  Valbenazine was not on formulary  It was sent to the hospital and started today  Repeat ekg showed qtc that was stable and not prolonged  She will have a repeat ekg tomorrow   D/c clonazepam 0 5 mg b i d  P r n 
Continue Lyrica  Valbenazine was not on formulary  It was sent to the hospital and started today  Repeat ekg shows that qtc was stable   May continue valbenazine  Will continue ativan 0 5mg bid prn
Continue PPI
Continue Paxil and BuSpar
Continue Paxil, buspar, and ativan prn
Continue aspirin and statin
Continue aspirin statin
Continue inderal and prazosin  BP currently controlled
Continue valbenazene and Lyrica  Appears to be at baseline
Follows with Psychiatry outpatient  Continue lithium, and Seroquel bedtime
History of opioid dependence, reporting bilateral knee pain  Caution regarding significant opioids for pain control  Recommend topical agents, Voltaren, diclofenac, Lidoderm patches  Minimize oxycodone as needed to work with physical therapy
History of opioid dependence, reporting bilateral knee pain  Caution regarding significant opioids for pain control  Recommend topical agents, Voltaren, diclofenac, Lidoderm patches and or creams  Minimize oxycodone as needed to work with physical therapy
History of opioid dependence, reporting bilateral knee pain  Caution regarding significant opioids for pain control  Recommend topical agents, Voltaren, diclofenac, Lidoderm patches and or creams  Minimize oxycodone as needed to work with physical therapy
Patient would history of bilateral knee pain, with worsening right knee arthritis  Status post 3/9 right total knee arthroplasty  Consulted by orthopedic surgery for medical management  PT and OT currently recommend rehab  H/H stable  DVTppx with lovenox, will need 30 days  Follow up with ortho in 2 weeks  Psych consulted for evaluation, will need option for placement
Patient would history of bilateral knee pain, with worsening right knee arthritis  Status post 3/9 right total knee arthroplasty  Consulted by orthopedic surgery for medical management  PT and OT currently recommend rehab  Tolerating diet, denies any nausea vomiting  Currently on room air, blood pressure control, labs fairly unremarkable  Initial hemoglobin of 14, 11 6 expected blood loss s/p post surgery  Currently receiving IV iron    Patient reports multiple complaints of pain, abdominal, lower back and knee pain  Reviewed prior history is, patient does have chronic pain disorder and has been following nurse surgery outpatient for her back  Her abdomen is soft, nontender with normal bowel sounds, reports last bowel movement several days ago      She did not appear to be constipated this time, would recommend adding senna and MiraLax to bowel regimen  Continue psychiatric medications for bipolar and anxiety/depression  Follow-up with orthopedic surgery as previously scheduled  DVT prophylaxis per primary team
Pt may follow up with Dr Alma Tucker outpt  Continue lithium, and Seroquel bedtime
Pt may follow up with Dr Conchis Sloan outpt  Continue lithium, and Seroquel bedtime
Pt may follow up with Dr Violette Whalen outpt  Continue lithium, and Seroquel bedtime
Status post 3/9 right total knee arthroplasty  PT and OT currently recommend rehab  H/H stable  DVTppx with lovenox, will need 30 days  Follow up with ortho in 2 weeks  Appreciate psych evaluation  She is cleared for discharge 
Status post 3/9 right total knee arthroplasty  PT and OT currently recommend rehab  H/H stable  DVTppx with lovenox, will need 30 days  Follow up with ortho in 2 weeks  Appreciate psych evaluation  She will be cleared for discharge tomorrow if ekg is stable 
Status post 3/9 right total knee arthroplasty  PT and OT currently recommend rehab  H/H stable  DVTppx with lovenox, will need 30 days  Follow up with ortho in 2 weeks  Appreciate psych evaluation  She will be cleared for discharge tomorrow if ekg is stable 
Status post 3/9 right total knee arthroplasty  PT and OT currently recommend rehab  H/H stable  DVTppx with lovenox, will need 30 days  Follow up with ortho in 2 weeks  Psych consulted for evaluation, will need option for placement
· s/p thoracolumbar decompression and fusion in 2018 in [place] with subsequent laminectomy syndrome   · S/p thoracic spinal cord stimulator placement in 7/28/20  · Recommend continue follow-up with Neurosurgery outpatient for further evaluation as patient does not use spinal stimulator at this time
100% of the time/able to follow multistep instructions

## 2022-07-29 ENCOUNTER — TELEPHONE (OUTPATIENT)
Dept: NEUROLOGY | Facility: CLINIC | Age: 57
End: 2022-07-29

## 2022-07-29 NOTE — TELEPHONE ENCOUNTER
Called and left a voicemail for patient - Please call back to confirm upcoming appointment with [facility]  Provided patient with apt date, time and location  Informed patient that check in is at least 15 minutes prior to apt time

## 2022-08-01 PROCEDURE — G0180 MD CERTIFICATION HHA PATIENT: HCPCS | Performed by: ORTHOPAEDIC SURGERY

## 2022-08-03 ENCOUNTER — TELEPHONE (OUTPATIENT)
Dept: OBGYN CLINIC | Facility: CLINIC | Age: 57
End: 2022-08-03

## 2022-08-03 DIAGNOSIS — Z96.652 STATUS POST TOTAL KNEE REPLACEMENT, LEFT: ICD-10-CM

## 2022-08-03 RX ORDER — OXYCODONE HYDROCHLORIDE 5 MG/1
5 TABLET ORAL 3 TIMES DAILY PRN
Qty: 28 TABLET | Refills: 0 | Status: SHIPPED | OUTPATIENT
Start: 2022-08-03 | End: 2022-08-11 | Stop reason: SDUPTHER

## 2022-08-03 NOTE — TELEPHONE ENCOUNTER
Pt contacted Call Center requested refill of their medication  Patient having increased pain     Medication Name: Oxycodone       Dosage of Med: 5 mg       Frequency of Med: 1 every 6 hours       Remaining Medication: 2    Pharmacy and Location: [address] [place] [address]  [phone]        Pt  Preferred Callback Phone Number: [phone]      Thank you        Please call patient back once refilled

## 2022-08-03 NOTE — TELEPHONE ENCOUNTER
Pt is calling stating that she wants her prescription for her oxycodone to go to    [facility]/pharmacy #[id number] Martins - [address] [facility], [address] [place] [id number]   Phone:  [phone]  Fax:  [phone]     Please call when ready    #[phone]

## 2022-08-10 ENCOUNTER — TELEPHONE (OUTPATIENT)
Dept: OBGYN CLINIC | Facility: HOSPITAL | Age: 57
End: 2022-08-10

## 2022-08-10 NOTE — TELEPHONE ENCOUNTER
6/7/18 @ 1149:  Received call from EMILE Reynolds, requesting PES to meet with patient regarding ETOH treatment options  [facility], [address]: PES met with 52year-old black male and discussed inpatient and outpatient options  Patient decided he only wanted outpatient treatment because he's going to visit his son, and doesn't want inpatient  Patient states that he drinks, "pint after pint of either vodka or gin; whatever I can get "  Patient has already detoxed while in the hospital   PES will contact Rae Mcdonnell at [facility] for assistance  [address] [facility] [place], [address] [place] [address]: PES contacted Rae Mcdonnell from [facility], who says she will arrive as soon as possible  PES left message for EMILE Reynolds  [address] [place], [place]  [address]: Rae Mcdonnell arrived to meet with patient  PES updated Rae Mcdonnell on situation    [facility] [place] [place] Attempted to call patient with no response  We will discuss this at her appt tomorrow  PT DAILY TREATMENT NOTE 2-15    Patient Name: Jacoby Ny  Date:3/11/2019  : 1966  [x]  Patient  Verified  Payor: Zandra Tinajero / Plan: Jolenevis Osmar PPO / Product Type: PPO /    In time: 1:30 pm  Out time:2:40 pm  Total Treatment Time (min): 65  Visit #: 3    Treatment Area: Left leg pain [M79.605]    SUBJECTIVE  Pain Level (0-10 scale): 4/10  Any medication changes, allergies to medications, adverse drug reactions, diagnosis change, or new procedure performed?: [x] No    [] Yes (see summary sheet for update)  Subjective functional status/changes:   [] No changes reported  Pt reports that she is able to walk a bit better, she just gets really tight really quickly when sitting and standing.      OBJECTIVE    Modality rationale: decrease inflammation, decrease pain and increase tissue extensibility to improve the patients ability to sit with less pain   Type Additional Details   15[x] Estim: []Att   [x]Unatt    []TENS instruct                  []IFC  [x]Premod   []NMES                     []Other:  []w/US   []w/ice   [x]w/heat  Position:R Sidelying  Location: L lat hip and L buttock   []  Traction: [] Cervical       []Lumbar                       [] Prone          []Supine                       []Intermittent   []Continuous Lbs:  [] before manual  [] after manual  []w/heat   []  Ultrasound: []Continuous   [] Pulsed                       at: []1MHz   []3MHz Location:  W/cm2:   [] Paraffin         Location:   []w/heat   []  Ice     []  Heat  []  Ice massage Position:  Location:   []  Laser  []  Other: Position:  Location:   []  Vasopneumatic Device Pressure:       [] lo [] med [] hi   Temperature:      [x] Skin assessment post-treatment:  [x]intact [x]redness- no adverse reaction    []redness  adverse reaction:         30 min Therapeutic Exercise:  [] See flow sheet :   Rationale: increase ROM, increase strength, improve coordination, improve balance and increase proprioception to improve the patients ability to return to N ADL skills      10 min Manual Therapy:  R SL for soft tissue massage and myofascial release to L buttock, L glute med and L piriformis   Rationale: decrease pain and increase tissue extensibility  to improve the patients ability to sit without pain            With   [] TE   [] TA   [] neuro   [] other: Patient Education: [x] Review HEP    [] Progressed/Changed HEP based on:   [] positioning   [] body mechanics   [] transfers   [] heat/ice application    [] other:      Other Objective/Functional Measures: no noted antalgia x 20 ft following treatment today     Pain Level (0-10 scale) post treatment: 2/10    ASSESSMENT/Changes in Function:   Remedios continues to have neural tension present L LE, hoewver she has been compliant with HEP and has been better with activity precautions. Patient will continue to benefit from skilled PT services to modify and progress therapeutic interventions, address functional mobility deficits, address ROM deficits, address strength deficits, analyze and address soft tissue restrictions, analyze and cue movement patterns, analyze and modify body mechanics/ergonomics, assess and modify postural abnormalities, address imbalance/dizziness and instruct in home and community integration to attain remaining goals. [x]  See Plan of Care  []  See progress note/recertification  []  See Discharge Summary         Progress towards goals / Updated goals:  Neural tension is improving with decreased antalgia noted.     PLAN  []  Upgrade activities as tolerated     [x]  Continue plan of care  [x]  Update interventions per flow sheet       []  Discharge due to:_  []  Other:_      Rigo Davis, PT, DPT, [facility] 3/11/2019

## 2022-08-10 NOTE — TELEPHONE ENCOUNTER
Pt contacted Call Center requested refill of their medication  Medication Name: OXYCODONE      Dosage of Med: 5 immediate release      Frequency of Med: 1 tab 3 times a day      Remaining Medication:       Pharmacy and Location: [facility]        Pt  Preferred Callback Phone Number:      Thank you  PLEASE ADVISE PATIENTS:    REFILL REQUESTS WILL BE PROCESSED WITHIN 24-48 HOURS

## 2022-08-11 ENCOUNTER — OFFICE VISIT (OUTPATIENT)
Dept: OBGYN CLINIC | Facility: CLINIC | Age: 57
End: 2022-08-11

## 2022-08-11 VITALS
BODY MASS INDEX: 34.95 KG/M2 | SYSTOLIC BLOOD PRESSURE: 113 MMHG | DIASTOLIC BLOOD PRESSURE: 76 MMHG | HEART RATE: 101 BPM | WEIGHT: 178 LBS | HEIGHT: 60 IN

## 2022-08-11 DIAGNOSIS — Z96.652 STATUS POST TOTAL KNEE REPLACEMENT, LEFT: Primary | ICD-10-CM

## 2022-08-11 DIAGNOSIS — Z96.651 STATUS POST TOTAL KNEE REPLACEMENT, RIGHT: ICD-10-CM

## 2022-08-11 DIAGNOSIS — M46.1 SACROILIITIS (HCC): ICD-10-CM

## 2022-08-11 PROCEDURE — 99024 POSTOP FOLLOW-UP VISIT: CPT | Performed by: ORTHOPAEDIC SURGERY

## 2022-08-11 RX ORDER — OXYCODONE HYDROCHLORIDE 5 MG/1
5 TABLET ORAL 2 TIMES DAILY PRN
Qty: 14 TABLET | Refills: 0 | Status: SHIPPED | OUTPATIENT
Start: 2022-08-11 | End: 2022-08-31 | Stop reason: ALTCHOICE

## 2022-08-11 NOTE — PROGRESS NOTES
Assessment/Plan:  1  Status post total knee replacement, left    2  Status post total knee replacement, right      Orders Placed This Encounter   Procedures    XR knee 3 vw left non injury     Declined PT order   Continue home exercises ( provided patient with home exercises)  Advised patient and her son in law to work on her motion 2 twice a day    Renewed Oxycodone 5 mg 1 tab BID for one week   Advised patient to follow up with her PCP for the low back pain( history of lumbar surgery)  Patient should call ahead for abx prior to dental appts  Return in about 4 weeks (around 9/8/2022) for PO Left TKA   I answered all of the patient's questions during the visit and provided education of the patient's condition during the visit  The patient verbalized understanding of the information given and agrees with the plan  This note was dictated using [employer] software  It may contain errors including improperly dictated words  Please contact physician directly for any questions  Subjective   Chief Complaint: No chief complaint on file  Sonia Oconnor is a 60 y o  female who presents for 5 week  follow up s/p left TKA, DOS 7/5/22  She states she is doing  Well  She is present in the room today with her son in law  She has not been having do to home physical therapy but has been doing home exercises daily  She is taking Oxycodone 5 mg 1 tablet TID and Tylenol  She did not take  mg or use Lovenox for DVT prophalaxis  Review of Systems  ROS:    See HPI for musculoskeletal review  All other systems reviewed are negative     History:  Past Medical History:   Diagnosis Date    Anxiety     Arthritis     left knee    At risk for falls     Bipolar 2 disorder (HCC)     FOLLOWS WITH PSYCHIATRIST   CONTINUE LAMOTRIGINE; RESOLVED: [id number]    Depression     Familial tremor     both hands    Fibromyalgia     LAST ASSESSED: [id number]    GERD (gastroesophageal reflux disease)     Hearing aid worn left ear    [place] (hard of hearing)     left ear    Hyperlipidemia     Hypertension     Left-sided weakness     Memory loss of unknown cause     long and short term    Migraine     Obesity     Obesity, Class II, BMI 35-39 9     Overactive bladder     Panic attack     Post traumatic stress disorder     S/P insertion of spinal cord stimulator     no remote    Seasonal allergies     Seizures (HCC)     possible seizure like activity    Stroke [facility])     questionable stroke 2009    Tardive dyskinesia     PATIENT STATES    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    CONT ASA 81 MG DAILY; RESOLVED: [id number]    Urinary incontinence     Uses walker     Wears dentures     partial lower / full upper- doesnt wear    Wears glasses      Past Surgical History:   Procedure Laterality Date    BACK SURGERY       SECTION      COLONOSCOPY      RESOLVED: [id number]    EAR SURGERY      EGD      HYSTERECTOMY      MYRINGOTOMY W/ TUBES Left     NECK SURGERY  2019    WI CYSTOURETHROSCOPY N/A 2016    Procedure: CYSTOSCOPY, BOTOX INJECTION;  Surgeon: Humaira Ceballos MD;  Location: [facility];  Service: Gynecology    WI IMPLANT SPINAL NEUROSTIM/ Right 2/10/2021    Procedure: REPLACEMENT IMPLANTABLE PULSE GENERATOR DORSAL SPINAL COLUMN STIMULATOR, RIGHT;  Surgeon: Rich Gomes MD;  [facility];  Service: Neurosurgery    WI PERCUT IMPLNT Zeynep Morrison Right 2020    Procedure: INSERTION THORACIC DORSAL COLUMN SPINAL CORD STIMULATOR PERCUTANEOUS W IMPLANTABLE PULSE GENERATOR, RIGHT;  Surgeon: Rich Gomes MD;  Location[facility];  Service: Neurosurgery    WI TOTAL KNEE ARTHROPLASTY Right 3/9/2022    Procedure: ARTHROPLASTY KNEE TOTAL;  Surgeon: Funmi Mcdermott DO;  [facility];  Service: Orthopedics    WI TOTAL KNEE ARTHROPLASTY Left 2022    Procedure: ARTHROPLASTY KNEE TOTAL;  Surgeon: Funmi Mcdermott DO; [facility];  Service: [facility]    UPPER GASTROINTESTINAL ENDOSCOPY  09/2020     Social History   Social History     Substance and Sexual Activity   Alcohol Use Not Currently    Comment: 2 x year; being a social drinker as per all scripts      Social History     Substance and Sexual Activity   Drug Use No     Social History     Tobacco Use   Smoking Status Never Smoker   Smokeless Tobacco Never Used     Family History:   Family History   Problem Relation Age of Onset    Colon cancer Mother     Alzheimer's disease Father     Stroke Father     Colon cancer Brother     Bipolar disorder Brother     Breast cancer Maternal Aunt     Colon cancer Maternal Aunt     Heart disease Other     Diabetes Other     Hypertension Other     Seizures Son     Depression Paternal Grandfather     No Known Problems Sister     No Known Problems Brother     Thyroid disease Neg Hx        Current Outpatient Medications on File Prior to Visit   Medication Sig Dispense Refill    Acetaminophen Extra Strength 500 MG tablet TAKE 2 TABLETS (1,000 MG TOTAL) BY MOUTH 3 (THREE) TIMES A  tablet 0    amLODIPine (NORVASC) 10 mg tablet TAKE 1 TABLET BY MOUTH EVERY DAY 90 tablet 3    ascorbic acid (VITAMIN C) 500 MG tablet Take 1 tablet (500 mg total) by mouth daily 30 tablet 1    atorvastatin (LIPITOR) 40 mg tablet TAKE 1 TABLET BY MOUTH EVERY DAY 90 tablet 3    benztropine (COGENTIN) 1 mg tablet Take 1 mg by mouth 2 (two) times a day      busPIRone (BUSPAR) 10 mg tablet Take 20 mg by mouth 3 (three) times a day       calcium carbonate (TUMS) 500 mg chewable tablet Chew 2 tablets every 8 (eight) hours as needed      docusate sodium (COLACE) 100 mg capsule Take 1 capsule (100 mg total) by mouth 2 (two) times a day 20 capsule 0    enoxaparin (LOVENOX) 40 mg/0 4 mL Inject 0 4 mL (40 mg total) under the skin daily 11 2 mL 0    ferrous sulfate 324 (65 Fe) mg Take 1 tablet (324 mg total) by mouth daily 30 tablet 1    folic acid (FOLVITE) 1 mg tablet TAKE 1 TABLET BY MOUTH EVERY DAY 90 tablet 1    hydrOXYzine HCL (ATARAX) 50 mg tablet Take 50 mg by mouth 3 (three) times a day as needed for itching      lithium carbonate (LITHOBID) 300 mg CR tablet Take 1 tablet (300 mg total) by mouth daily at bedtime 30 tablet 0    LORazepam (ATIVAN) 0 5 mg tablet Take 1 tablet (0 5 mg total) by mouth 2 (two) times a day as needed for anxiety for up to 10 days 10 tablet 0    methocarbamol (ROBAXIN) 500 mg tablet Take 1 tablet (500 mg total) by mouth 3 (three) times a day as needed for muscle spasms Do not take at the same time as oxycodone 21 tablet 0    Multiple Vitamin (multivitamin) tablet Take 1 tablet by mouth daily 30 tablet 1    naloxone (NARCAN) 4 mg/0 1 mL nasal spray Administer 1 spray into a nostril  If no response after 2-3 minutes, give another dose in the other nostril using a new spray  1 each 1    omeprazole (PriLOSEC) 20 mg delayed release capsule TAKE 1 CAPSULE BY MOUTH EVERY DAY 90 capsule 3    oxyCODONE (ROXICODONE) 5 immediate release tablet Take 1 tablet (5 mg total) by mouth 3 (three) times a day as needed for severe pain Max Daily Amount: 15 mg 28 tablet 0    pantoprazole (PROTONIX) 20 mg tablet Take 20 mg by mouth daily      PARoxetine (PAXIL) 30 mg tablet Take 2 tablets (60 mg total) by mouth daily 180 tablet 3    PARoxetine (PAXIL) 30 mg tablet Take 60 mg by mouth 2 (two) times a day  Takes two (60 mg) tabs by mouth nightly   Indications: Social Anxiety Disorder      potassium chloride (K-DUR,KLOR-CON) 10 mEq tablet Take 10 mEq by mouth in the morning      prazosin (MINIPRESS) 2 mg capsule Take 1 capsule (2 mg total) by mouth daily at bedtime 30 capsule 0    prazosin (MINIPRESS) 2 mg capsule Take 2 mg by mouth daily at bedtime   Indications: Frightening Dreams      pregabalin (LYRICA) 150 mg capsule Take 1 capsule (150 mg total) by mouth 3 (three) times a day (Patient not taking: Reported on 6/28/2022) 120 capsule 1    propranolol (INDERAL) 10 mg tablet Take 1 tablet (10 mg total) by mouth 2 (two) times a day before breakfast and lunch (Patient taking differently: Take 20 mg by mouth 2 (two) times a day before breakfast and lunch  Indications: High Blood Pressure Disorder)  0    QUEtiapine (SEROquel) 25 mg tablet Take 2 tablets (50 mg total) by mouth daily at bedtime 180 tablet 3    QUEtiapine (SEROquel) 25 mg tablet Take 50 mg by mouth daily at bedtime  Indications: Generalized Anxiety Disorder      senna (SENOKOT) 8 6 MG tablet Take 1 tablet by mouth daily as needed      Valbenazine Tosylate (Ingrezza) 80 MG CAPS Take by mouth       No current facility-administered medications on file prior to visit       No Known Allergies     Objective     /76   Pulse 101   Ht 5' (1 524 m)   Wt 80 7 kg (178 lb)   BMI 34 76 kg/m²      PE:  AAOx 3  WDWN  Hearing intact, no drainage from eyes  no audible wheezing  no abdominal distension  LE compartments soft, AT/GS intact    Ortho Exam:  left Knee:   INC: C/D/I, No erythema, mild swelling  AROM:10 - 95 degrees  PROM: 5-110     Imaging Studies: I have personally reviewed pertinent films in PACS  XR left knee:  S/p TKA, adequate alignment        Scribe Attestation    I,:   am acting as a scribe while in the presence of the attending physician :       I,:   personally performed the services described in this documentation    as scribed in my presence :

## 2022-08-11 NOTE — PROGRESS NOTES
Assessment/Plan:  1  Status post total knee replacement, left      Orders Placed This Encounter   Procedures    XR knee 3 vw left non injury       Continue PT  Pain control prn- ***  Patient should call ahead for abx prior to dental appts  No follow-ups on file  I answered all of the patient's questions during the visit and provided education of the patient's condition during the visit  The patient verbalized understanding of the information given and agrees with the plan  This note was dictated using M*Modal software  It may contain errors including improperly dictated words  Please contact physician directly for any questions  Subjective   Chief Complaint: No chief complaint on file  Meredith Knight is a 60 y o  female who presents for 6 week follow up s/p {right/left/bilateral:55762} TKA  Review of Systems  ROS:    See HPI for musculoskeletal review  All other systems reviewed are negative     History:  Past Medical History:   Diagnosis Date    Anxiety     Arthritis     left knee    At risk for falls     Bipolar 2 disorder (HCC)     FOLLOWS WITH PSYCHIATRIST  CONTINUE LAMOTRIGINE; RESOLVED: [id number]    Depression     Familial tremor     both hands    Fibromyalgia     LAST ASSESSED: [id number]    GERD (gastroesophageal reflux disease)     Hearing aid worn     left ear    [place] (hard of hearing)     left ear    Hyperlipidemia     Hypertension     Left-sided weakness     Memory loss of unknown cause     long and short term    Migraine     Obesity     Obesity, Class II, BMI 35-39 9     Overactive bladder     Panic attack     Post traumatic stress disorder     S/P insertion of spinal cord stimulator     no remote    Seasonal allergies     Seizures (HCC)     possible seizure like activity    Stroke [facility])     questionable stroke 2009    Tardive dyskinesia     PATIENT STATES    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    CONT ASA 81 MG DAILY; RESOLVED: [id number]    Urinary incontinence     Uses walker     Wears dentures     partial lower / full upper- doesnt wear    Wears glasses      Past Surgical History:   Procedure Laterality Date    BACK SURGERY       SECTION  1986    COLONOSCOPY      RESOLVED: [id number]    EAR SURGERY      EGD      HYSTERECTOMY  2004    MYRINGOTOMY W/ TUBES Left     NECK SURGERY  2019    MS CYSTOURETHROSCOPY N/A 2016    Procedure: CYSTOSCOPY, BOTOX INJECTION;  Surgeon: Benedict Unger MD;  [facility];  Service: Gynecology    MS IMPLANT SPINAL NEUROSTIM/ Right 2/10/2021    Procedure: REPLACEMENT IMPLANTABLE PULSE GENERATOR DORSAL SPINAL COLUMN STIMULATOR, RIGHT;  Surgeon: Geraldine Ames MD;  [facility];  Service: Neurosurgery    MS PERCUT IMPLNT [place] Right 2020    Procedure: INSERTION THORACIC DORSAL COLUMN SPINAL CORD STIMULATOR PERCUTANEOUS W IMPLANTABLE PULSE GENERATOR, RIGHT;  Surgeon: Geraldine Ames MD;  [facility];  Service: Neurosurgery    MS TOTAL KNEE ARTHROPLASTY Right 3/9/2022    Procedure: ARTHROPLASTY KNEE TOTAL;  Surgeon: Gustavo Vallejo DO;  [facility];  Service: Orthopedics    MS TOTAL KNEE ARTHROPLASTY Left 2022    Procedure: ARTHROPLASTY KNEE TOTAL;  Surgeon: Gustavo Vallejo DO;  [facility];  Service: Orthopedics    TONSILLECTOMY      TUBAL LIGATION      UPPER GASTROINTESTINAL ENDOSCOPY  2020     Social History   Social History     Substance and Sexual Activity   Alcohol Use Not Currently    Comment: 2 x year; being a social drinker as per all scripts      Social History     Substance and Sexual Activity   Drug Use No     Social History     Tobacco Use   Smoking Status Never Smoker   Smokeless Tobacco Never Used     Family History:   Family History   Problem Relation Age of Onset    Colon cancer Mother     Alzheimer's disease Father     Stroke Father     Colon cancer Brother     Bipolar disorder Brother     Breast cancer Maternal Aunt     Colon cancer Maternal Aunt     Heart disease Other     Diabetes Other     Hypertension Other     Seizures Son     Depression Paternal Grandfather     No Known Problems Sister     No Known Problems Brother     Thyroid disease Neg Hx        Current Outpatient Medications on File Prior to Visit   Medication Sig Dispense Refill    Acetaminophen Extra Strength 500 MG tablet TAKE 2 TABLETS (1,000 MG TOTAL) BY MOUTH 3 (THREE) TIMES A  tablet 0    amLODIPine (NORVASC) 10 mg tablet TAKE 1 TABLET BY MOUTH EVERY DAY 90 tablet 3    ascorbic acid (VITAMIN C) 500 MG tablet Take 1 tablet (500 mg total) by mouth daily 30 tablet 1    atorvastatin (LIPITOR) 40 mg tablet TAKE 1 TABLET BY MOUTH EVERY DAY 90 tablet 3    benztropine (COGENTIN) 1 mg tablet Take 1 mg by mouth 2 (two) times a day      busPIRone (BUSPAR) 10 mg tablet Take 20 mg by mouth 3 (three) times a day       calcium carbonate (TUMS) 500 mg chewable tablet Chew 2 tablets every 8 (eight) hours as needed      docusate sodium (COLACE) 100 mg capsule Take 1 capsule (100 mg total) by mouth 2 (two) times a day 20 capsule 0    enoxaparin (LOVENOX) 40 mg/0 4 mL Inject 0 4 mL (40 mg total) under the skin daily 11 2 mL 0    ferrous sulfate 324 (65 Fe) mg Take 1 tablet (324 mg total) by mouth daily 30 tablet 1    folic acid (FOLVITE) 1 mg tablet TAKE 1 TABLET BY MOUTH EVERY DAY 90 tablet 1    hydrOXYzine HCL (ATARAX) 50 mg tablet Take 50 mg by mouth 3 (three) times a day as needed for itching      lithium carbonate (LITHOBID) 300 mg CR tablet Take 1 tablet (300 mg total) by mouth daily at bedtime 30 tablet 0    LORazepam (ATIVAN) 0 5 mg tablet Take 1 tablet (0 5 mg total) by mouth 2 (two) times a day as needed for anxiety for up to 10 days 10 tablet 0    methocarbamol (ROBAXIN) 500 mg tablet Take 1 tablet (500 mg total) by mouth 3 (three) times a day as needed for muscle spasms Do not take at the same time as oxycodone 21 tablet 0    Multiple Vitamin (multivitamin) tablet Take 1 tablet by mouth daily 30 tablet 1    naloxone (NARCAN) 4 mg/0 1 mL nasal spray Administer 1 spray into a nostril  If no response after 2-3 minutes, give another dose in the other nostril using a new spray  1 each 1    omeprazole (PriLOSEC) 20 mg delayed release capsule TAKE 1 CAPSULE BY MOUTH EVERY DAY 90 capsule 3    oxyCODONE (ROXICODONE) 5 immediate release tablet Take 1 tablet (5 mg total) by mouth 3 (three) times a day as needed for severe pain Max Daily Amount: 15 mg 28 tablet 0    pantoprazole (PROTONIX) 20 mg tablet Take 20 mg by mouth daily      PARoxetine (PAXIL) 30 mg tablet Take 2 tablets (60 mg total) by mouth daily 180 tablet 3    PARoxetine (PAXIL) 30 mg tablet Take 60 mg by mouth 2 (two) times a day  Takes two (60 mg) tabs by mouth nightly   Indications: Social Anxiety Disorder      potassium chloride (K-DUR,KLOR-CON) 10 mEq tablet Take 10 mEq by mouth in the morning      prazosin (MINIPRESS) 2 mg capsule Take 1 capsule (2 mg total) by mouth daily at bedtime 30 capsule 0    prazosin (MINIPRESS) 2 mg capsule Take 2 mg by mouth daily at bedtime  Indications: Frightening Dreams      pregabalin (LYRICA) 150 mg capsule Take 1 capsule (150 mg total) by mouth 3 (three) times a day (Patient not taking: Reported on 6/28/2022) 120 capsule 1    propranolol (INDERAL) 10 mg tablet Take 1 tablet (10 mg total) by mouth 2 (two) times a day before breakfast and lunch (Patient taking differently: Take 20 mg by mouth 2 (two) times a day before breakfast and lunch  Indications: High Blood Pressure Disorder)  0    QUEtiapine (SEROquel) 25 mg tablet Take 2 tablets (50 mg total) by mouth daily at bedtime 180 tablet 3    QUEtiapine (SEROquel) 25 mg tablet Take 50 mg by mouth daily at bedtime   Indications: Generalized Anxiety Disorder      senna (SENOKOT) 8 6 MG tablet Take 1 tablet by mouth daily as needed      Valbenazine Tosylate (Ingrezza) 80 MG CAPS Take by mouth       No current facility-administered medications on file prior to visit       No Known Allergies     Objective     /76   Pulse 101   Ht 5' (1 524 m)   Wt 80 7 kg (178 lb)   BMI 34 76 kg/m²      PE:  AAOx 3  WDWN  Hearing intact, no drainage from eyes  no audible wheezing  no abdominal distension  LE compartments soft, AT/GS intact    Ortho Exam:  left Knee:   INC: healed, No erythema, mild swelling  AROM:{NUMBERS:20191} - {NUMBERS:42084} degrees

## 2022-08-22 ENCOUNTER — TELEPHONE (OUTPATIENT)
Dept: OBGYN CLINIC | Facility: HOSPITAL | Age: 57
End: 2022-08-22

## 2022-08-22 NOTE — TELEPHONE ENCOUNTER
Spoke with patient and she reports increasing pain after several falls  I recommended she return to the office for an evaluation and x-rays this week  Would you be able to call her and schedule her for this week? Thank you!

## 2022-08-22 NOTE — TELEPHONE ENCOUNTER
Patient is calling to state she is having trouble walking, and has been falling periodically   Please advise    CB # [phone]

## 2022-08-25 ENCOUNTER — OFFICE VISIT (OUTPATIENT)
Dept: OBGYN CLINIC | Facility: CLINIC | Age: 57
End: 2022-08-25

## 2022-08-25 VITALS
DIASTOLIC BLOOD PRESSURE: 76 MMHG | BODY MASS INDEX: 34.95 KG/M2 | WEIGHT: 178 LBS | HEIGHT: 60 IN | SYSTOLIC BLOOD PRESSURE: 107 MMHG | HEART RATE: 106 BPM

## 2022-08-25 DIAGNOSIS — Z96.653 STATUS POST BILATERAL KNEE REPLACEMENTS: Primary | ICD-10-CM

## 2022-08-25 PROCEDURE — 99024 POSTOP FOLLOW-UP VISIT: CPT | Performed by: ORTHOPAEDIC SURGERY

## 2022-08-25 NOTE — PROGRESS NOTES
Assessment/Plan:  1  Status post bilateral knee replacements      Orders Placed This Encounter   Procedures    XR knee 3 vw left non injury    XR knee 3 vw right non injury    Ambulatory referral to Physical Therapy       PT order was given out for range of motion and strengthening exercises   Pain control prn-Tylenol 1000 mg  pain medication   She is aware no more refills of Oxycodone will be given out starting today  Patient should call ahead for abx prior to dental appts  Return in about 4 weeks (around 9/22/2022) for PO Bilateral TKA   I answered all of the patient's questions during the visit and provided education of the patient's condition during the visit  The patient verbalized understanding of the information given and agrees with the plan  This note was dictated using [employer] software  It may contain errors including improperly dictated words  Please contact physician directly for any questions  Subjective   Chief Complaint:   Chief Complaint   Patient presents with    Left Knee - Follow-up, Post-op    Right Knee - Post-op, Follow-up       Samantha QUINONEZ Sindi Dean is a 60 y o  female who presents for 7 week follow up s/p left TKA, DOS 7/5/22 and 5 5 months s/p right total knee arthroplasty, DOS 3/9/22  She is present in the room today with her son in law  She had a fall on Tuesday  She states she was standing in the living room and collapsed to the ground falling directly onto her knees  She states her legs feel weak  She is having pain over the anterior lateral aspect bilaterally worse on the right  She has been doing range-of-motion exercises twice a day with her son-in-law  She is using a Rollator for assistance when ambulating  She has an appointment with her PCP today to discuss her low back pain  Review of Systems  ROS:    See HPI for musculoskeletal review     All other systems reviewed are negative     History:  Past Medical History:   Diagnosis Date    Anxiety     Arthritis left knee    At risk for falls     Bipolar 2 disorder (HCC)     FOLLOWS WITH PSYCHIATRIST  CONTINUE LAMOTRIGINE; RESOLVED: [id number]    Depression     Familial tremor     both hands    Fibromyalgia     LAST ASSESSED: [id number]    GERD (gastroesophageal reflux disease)     Hearing aid worn     left ear    [place] (hard of hearing)     left ear    Hyperlipidemia     Hypertension     Left-sided weakness     Memory loss of unknown cause     long and short term    Migraine     Obesity     Obesity, Class II, BMI 35-39 9     Overactive bladder     Panic attack     Post traumatic stress disorder     S/P insertion of spinal cord stimulator     no remote    Seasonal allergies     Seizures (HCC)     possible seizure like activity    Stroke [facility])     questionable stroke 2009    Tardive dyskinesia     PATIENT STATES    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    CONT ASA 81 MG DAILY; RESOLVED: [id number]    Urinary incontinence     Uses walker     Wears dentures     partial lower / full upper- doesnt wear    Wears glasses      Past Surgical History:   Procedure Laterality Date    BACK SURGERY       SECTION      COLONOSCOPY      RESOLVED: [id number]    EAR SURGERY      EGD      HYSTERECTOMY      MYRINGOTOMY W/ TUBES Left     NECK SURGERY  2019    ID CYSTOURETHROSCOPY N/A 2016    Procedure: CYSTOSCOPY, BOTOX INJECTION;  Surgeon: Renetta Marrero MD;  [facility];  Service: Gynecology    ID IMPLANT SPINAL NEUROSTIM/ Right 2/10/2021    Procedure: REPLACEMENT IMPLANTABLE PULSE GENERATOR DORSAL SPINAL COLUMN STIMULATOR, RIGHT;  Surgeon: Daija Blas MD;  [facility];  Service: Neurosurgery    ID PERCUT IMPLNT Jami Been Right 2020    Procedure: INSERTION THORACIC DORSAL COLUMN SPINAL CORD STIMULATOR PERCUTANEOUS W IMPLANTABLE PULSE GENERATOR, RIGHT;  Surgeon: Daija Blas MD;  [facility];  Service: Neurosurgery    UT TOTAL KNEE ARTHROPLASTY Right 3/9/2022    Procedure: ARTHROPLASTY KNEE TOTAL;  Surgeon: Stas Denton DO;  [facility];  Service: Orthopedics    UT TOTAL KNEE ARTHROPLASTY Left 7/5/2022    Procedure: ARTHROPLASTY KNEE TOTAL;  Surgeon: Stas Denton DO;  [facility];  Service: Orthopedics    TONSILLECTOMY     [address] [place]    UPPER GASTROINTESTINAL ENDOSCOPY  09/2020     Social History   Social History     Substance and Sexual Activity   Alcohol Use Not Currently    Comment: 2 x year; being a social drinker as per all scripts      Social History     Substance and Sexual Activity   Drug Use No     Social History     Tobacco Use   Smoking Status Never Smoker   Smokeless Tobacco Never Used     Family History:   Family History   Problem Relation Age of Onset    Colon cancer Mother     Alzheimer's disease Father     Stroke Father     Colon cancer Brother     Bipolar disorder Brother     Breast cancer Maternal Aunt     Colon cancer Maternal Aunt     Heart disease Other     Diabetes Other     Hypertension Other     Seizures Son     Depression Paternal Grandfather     No Known Problems Sister     No Known Problems Brother     Thyroid disease Neg Hx        Current Outpatient Medications on File Prior to Visit   Medication Sig Dispense Refill    Acetaminophen Extra Strength 500 MG tablet TAKE 2 TABLETS (1,000 MG TOTAL) BY MOUTH 3 (THREE) TIMES A  tablet 0    amLODIPine (NORVASC) 10 mg tablet TAKE 1 TABLET BY MOUTH EVERY DAY 90 tablet 3    ascorbic acid (VITAMIN C) 500 MG tablet Take 1 tablet (500 mg total) by mouth daily 30 tablet 1    atorvastatin (LIPITOR) 40 mg tablet TAKE 1 TABLET BY MOUTH EVERY DAY 90 tablet 3    benztropine (COGENTIN) 1 mg tablet Take 1 mg by mouth 2 (two) times a day      busPIRone (BUSPAR) 10 mg tablet Take 20 mg by mouth 3 (three) times a day       calcium carbonate (TUMS) 500 mg chewable tablet Chew 2 tablets every 8 (eight) hours as needed      docusate sodium (COLACE) 100 mg capsule Take 1 capsule (100 mg total) by mouth 2 (two) times a day 20 capsule 0    enoxaparin (LOVENOX) 40 mg/0 4 mL Inject 0 4 mL (40 mg total) under the skin daily 11 2 mL 0    ferrous sulfate 324 (65 Fe) mg Take 1 tablet (324 mg total) by mouth daily 30 tablet 1    folic acid (FOLVITE) 1 mg tablet TAKE 1 TABLET BY MOUTH EVERY DAY 90 tablet 1    hydrOXYzine HCL (ATARAX) 50 mg tablet Take 50 mg by mouth 3 (three) times a day as needed for itching      lithium carbonate (LITHOBID) 300 mg CR tablet Take 1 tablet (300 mg total) by mouth daily at bedtime 30 tablet 0    LORazepam (ATIVAN) 0 5 mg tablet Take 1 tablet (0 5 mg total) by mouth 2 (two) times a day as needed for anxiety for up to 10 days 10 tablet 0    methocarbamol (ROBAXIN) 500 mg tablet Take 1 tablet (500 mg total) by mouth 3 (three) times a day as needed for muscle spasms Do not take at the same time as oxycodone 21 tablet 0    Multiple Vitamin (multivitamin) tablet Take 1 tablet by mouth daily 30 tablet 1    naloxone (NARCAN) 4 mg/0 1 mL nasal spray Administer 1 spray into a nostril  If no response after 2-3 minutes, give another dose in the other nostril using a new spray  1 each 1    omeprazole (PriLOSEC) 20 mg delayed release capsule TAKE 1 CAPSULE BY MOUTH EVERY DAY 90 capsule 3    oxyCODONE (ROXICODONE) 5 immediate release tablet Take 1 tablet (5 mg total) by mouth 2 (two) times a day as needed for moderate pain Max Daily Amount: 10 mg 14 tablet 0    pantoprazole (PROTONIX) 20 mg tablet Take 20 mg by mouth daily      PARoxetine (PAXIL) 30 mg tablet Take 2 tablets (60 mg total) by mouth daily 180 tablet 3    PARoxetine (PAXIL) 30 mg tablet Take 60 mg by mouth 2 (two) times a day   Takes two (60 mg) tabs by mouth nightly   Indications: Social Anxiety Disorder      potassium chloride (K-DUR,KLOR-CON) 10 mEq tablet Take 10 mEq by mouth in the morning      prazosin (MINIPRESS) 2 mg capsule Take 1 capsule (2 mg total) by mouth daily at bedtime 30 capsule 0    prazosin (MINIPRESS) 2 mg capsule Take 2 mg by mouth daily at bedtime  Indications: Frightening Dreams      pregabalin (LYRICA) 150 mg capsule Take 1 capsule (150 mg total) by mouth 3 (three) times a day (Patient not taking: Reported on 6/28/2022) 120 capsule 1    propranolol (INDERAL) 10 mg tablet Take 1 tablet (10 mg total) by mouth 2 (two) times a day before breakfast and lunch (Patient taking differently: Take 20 mg by mouth 2 (two) times a day before breakfast and lunch  Indications: High Blood Pressure Disorder)  0    QUEtiapine (SEROquel) 25 mg tablet Take 2 tablets (50 mg total) by mouth daily at bedtime 180 tablet 3    QUEtiapine (SEROquel) 25 mg tablet Take 50 mg by mouth daily at bedtime  Indications: Generalized Anxiety Disorder      senna (SENOKOT) 8 6 MG tablet Take 1 tablet by mouth daily as needed      Valbenazine Tosylate (Ingrezza) 80 MG CAPS Take by mouth       No current facility-administered medications on file prior to visit       No Known Allergies     Objective     /76   Pulse (!) 106   Ht 5' (1 524 m)   Wt 80 7 kg (178 lb)   BMI 34 76 kg/m²      PE:  AAOx 3  WDWN  Hearing intact, no drainage from eyes  no audible wheezing  no abdominal distension  LE compartments soft, AT/GS intact    Ortho Exam:  right Knee:   INC: healed, No erythema, mild swelling  AROM:15 - 115 degrees  PROM:  degrees   No pain with hip ROM     left Knee:   INC: healed, No erythema, mild swelling  AROM:10 - 115 degrees   Stable to varus and valgus stress  No pain with hip ROM       Imaging Studies: I have personally reviewed pertinent films in PACS  XR left knee:  S/p TKA, adequate alignment, hardware in take, no acute changes, no acute osseous abnormality     XR right knee: S/p TKA, adequate alignment, hardware in take, no acute changes, no acute osseous abnormality      Scribe Attestation    I,: Jazmin Stuart am acting as a scribe while in the presence of the attending physician :       I,:  Cruz Grajeda, DO personally performed the services described in this documentation    as scribed in my presence :

## 2022-08-29 ENCOUNTER — TELEPHONE (OUTPATIENT)
Dept: NEUROLOGY | Facility: CLINIC | Age: 57
End: 2022-08-29

## 2022-08-30 ENCOUNTER — TELEPHONE (OUTPATIENT)
Dept: INTERNAL MEDICINE CLINIC | Facility: CLINIC | Age: 57
End: 2022-08-30

## 2022-08-30 ENCOUNTER — PATIENT OUTREACH (OUTPATIENT)
Dept: INTERNAL MEDICINE CLINIC | Facility: CLINIC | Age: 57
End: 2022-08-30

## 2022-08-30 DIAGNOSIS — Z96.651 STATUS POST TOTAL KNEE REPLACEMENT, RIGHT: ICD-10-CM

## 2022-08-30 RX ORDER — ACETAMINOPHEN 500 MG
TABLET ORAL
Qty: 180 TABLET | Refills: 0 | OUTPATIENT
Start: 2022-08-30

## 2022-08-30 NOTE — PROGRESS NOTES
Outpatient Care Management Note:    Received notification from clerical staff Toan Woodward) that patient has called office multiple times this morning regarding knee pain and wanting appointment with PCP  I requested for call to be transferred to me so I could further speak with patient  Patient reports she was calling to get phone numbers  I provided her the phone number for ADELIA Boss  [phone]  She requested the transportation number for her insurance [phone]  She requesting phone number for [facility] in [facility] [phone] ([address])  Patient reports she has has a lot of falls at home  She reports she is falling even with using her rollator  Patient reports she is doing her "own physical therapy" at home  I reviewed with patient her ortho visit on 8/25/22  I encouraged her to attend outpatient physical therapy for range of motion and strengthening exercises  Patient aware next ortho appointment is 9/15 and I stated they will want to see that she as started PT  She reports she does not want to do PT at this time and would not give me a reason why  Explained how PT will be beneficial  I requested to speak with her daughter and/or her son in law/paid caregiver but patient adamantly refused  Patient states she will think about it over the next week  I did review with her that she is to taking Tylenol as needed for pain and that she will not be getting any more refills on her oxycodone  Per chart review later patient scheduled for PT on 9/6 @ 9:30 am      Patient reports she has been having a hard time thinking and putting words together  She reports also reports difficulty understanding information at times  Patient aware of Neurology appointment tomorrow 8/31 @ 11 am  Encouraged to discuss this further at her appointment tomorrow  Encouraged she have someone attend appointment with her       Patient requesting an appointment with PCP but states now she has too much going on and feels overwhelmed and does not want to schedule  I did make patient aware she did not come to last appointment and her son was to bring her and all her medication to have a thorough med rec completed  Patient declining to schedule with PCP at this time  Patient reports she has all her medication at this time and declined to go over her medication over the phone  Patient living with her daughter and son in law/paid caregiver and grandchildren  Patient reports she feels safe where she is and feels she is getting the care she needs  Patient does not have any further questions, concerns, or other needs at this time  Pt has my contact # and PCP office # if needed  Pt is agreeable for further outreach

## 2022-08-30 NOTE — TELEPHONE ENCOUNTER
Patient called in this morning to schedule an appointment  When I asked her if it was for PCP or specialty she began to say her knees were hurting her  I then told her she would need to call Orthopedics  She then  said "no,no,no I had a seizure who is the doctor that I would see for that"? I responded and told her that would be with Neurology and you are scheduled with them on 8/31/22  Samantha then said I would like to schedule with you guys, with my primary care doctor  The call then dropped and she called back and said "ugh I cant remember, Ill call back"  This is just an FYI patient is having a hard time remembering things

## 2022-08-30 NOTE — TELEPHONE ENCOUNTER
Patient called back - wanted to make appointment with PCP for her knee pain  I also advised that she needs to see Ortho - patient indicated she already saw them 8/25/22     Patient was upset because she stated that she never had so much trouble scheduling an appointment   Advised patient that we want to make sure that the appointment is for the correct reason other than ortho or neurology issues    Patient requested medication refill  Please note that the patient sounded confused and did not understand what was being said to her

## 2022-08-30 NOTE — PROGRESS NOTES
Davin Carrington, thank you for the update  We agree that patient should discuss her balance issues with her neurologist  We highly encourage her to attend outpatient PT as she is likely deconditioned since she  has not had much post op therapy  We will follow up with patient on 9/15   Thanks

## 2022-08-31 ENCOUNTER — OFFICE VISIT (OUTPATIENT)
Dept: NEUROLOGY | Facility: CLINIC | Age: 57
End: 2022-08-31
Payer: MEDICARE

## 2022-08-31 VITALS
BODY MASS INDEX: 33.26 KG/M2 | RESPIRATION RATE: 16 BRPM | DIASTOLIC BLOOD PRESSURE: 96 MMHG | HEART RATE: 98 BPM | WEIGHT: 165 LBS | TEMPERATURE: 97.1 F | SYSTOLIC BLOOD PRESSURE: 127 MMHG | HEIGHT: 59 IN

## 2022-08-31 DIAGNOSIS — G89.4 CHRONIC PAIN DISORDER: ICD-10-CM

## 2022-08-31 DIAGNOSIS — R29.6 FREQUENT FALLS: ICD-10-CM

## 2022-08-31 DIAGNOSIS — R41.3 MEMORY LOSS: Primary | ICD-10-CM

## 2022-08-31 DIAGNOSIS — Z86.73 HISTORY OF STROKE: ICD-10-CM

## 2022-08-31 DIAGNOSIS — H91.90 HEARING LOSS: ICD-10-CM

## 2022-08-31 DIAGNOSIS — R56.9 SEIZURE-LIKE ACTIVITY (HCC): ICD-10-CM

## 2022-08-31 PROBLEM — I69.359 HEMIPLEGIA, POST-STROKE (HCC): Status: ACTIVE | Noted: 2022-08-31

## 2022-08-31 PROCEDURE — 99214 OFFICE O/P EST MOD 30 MIN: CPT | Performed by: NURSE PRACTITIONER

## 2022-08-31 RX ORDER — MIRTAZAPINE 7.5 MG/1
7.5 TABLET, FILM COATED ORAL EVERY EVENING
COMMUNITY
Start: 2022-08-04

## 2022-08-31 RX ORDER — PREGABALIN 50 MG/1
CAPSULE ORAL
Qty: 90 CAPSULE | Refills: 2 | Status: SHIPPED | OUTPATIENT
Start: 2022-08-31

## 2022-08-31 NOTE — PROGRESS NOTES
Patient ID: Mata Salazar is a 60 y o  female  Assessment/Plan:  Patient Instructions:  Stop oxycodone use  Reintroduce lyrica at lower dose  If the dizziness/falls/change in gait happen more at night take blood pressures then and take notice to which medication may be a cause and speak with the psychiatrist about this  Continue with PT  MRI/labs for further evaluation of continued memory loss  Continue with lower dose of the ingrezza- 60mg  Do longer EEG for evaluation of seizure like activity  Follow up in 3 months       Diagnoses and all orders for this visit:    Memory loss  -     CBC and differential; Future  -     Comprehensive metabolic panel; Future  -     Vitamin B12; Future  -     Folate; Future  -     TSH, 3rd generation with Free T4 reflex; Future  -     Cancel: MRI brain NeuroQuant wo and w contrast; Future    Seizure-like activity (HCC)  -     CBC and differential; Future  -     Comprehensive metabolic panel; Future  -     Cancel: MRI brain NeuroQuant wo and w contrast; Future  -     Ambulatory EEG 48 Hours; Standing    Hearing loss  -     Ambulatory Referral to Audiology; Future    Chronic pain disorder  -     pregabalin (LYRICA) 50 mg capsule; 1 capsule twice a day  May increase to 1 capsule 3 times per day if needed  History of stroke    Frequent falls    Other orders  -     mirtazapine (REMERON) 7 5 MG tablet; Take 7 5 mg by mouth every evening         Subjective:    ADI  Samantha presents today with her daughter for follow up  She was last seen in June  She has since had her knee surgery and was placed on oxycodone for pain control for a period of time  Her daughter states she has had fall and confusion at night especially  She did hit her head with a fall a few weeks ago  She has been doing home exercises and does feel overall she is able to move better after the knee replacement  She is getting 10 hours of sleep/night  She has no bleeding complaints   She feels her symptoms are well controlled on current medications except her chronic pain-she has not restarted lyrica  She is currently working with a  to see if she can get more help in the home or if going into a facility would be a better option for her  Currently she is being cared for by family members  Her appetite has been decreased  She still complains of memory loss/forgeting words  No repeat seizure like activity per family, although per family her memory does appear to fluctuate  She recently missed one of her psychiatry appointments, she has an appointment scheduled for sept 15th-she denies recent change in mood  SCS may be MRI conditional- S/p thoracic spinal cord stimulator placement in 7/28/20  EKG 6/8/2022- NSR, qtc 465    Recent labs:  hgb 8 2, hct 25 9, mcv 93, rdw 16 1 platelets 260  Na 641, k 3 7, bun 5 creat 0 66, glucose 96  a1c 4 8  inr 1  crp 6  Ferritin 96      Previous History:  60year old female with past medical history of bipolar, 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, CVA (states 2009 in [place] with residual left sided weakness),  Obesity (was going to have weight loss surgery but never completed this), seizure like activity not on AED, cognitive impairment/ history of encephalopathy likely related to polypharmacy, MIMI not on CPAP, OA with gait dysfunction/chronic pain, DDD s/p lumbar/cervical fusion and now with SCS  she was evaluated by neurology at [facility] (was hospitalized for gait dysfunction from 5/9-5/25/2022)  for witnessed seizure activity; this was first unprovoked seizure; EEG showed diffuse slowing; CT head neg; she was not able to get MRI because of her stimulator  EKG showed slight Qt prolongation; she did have reduction in her seroquel recently; she complains of continued bilateral hand tremor  She denies any repeat seizure like activity since being home; she does have family history of seizures  No bowel/bladder complaints;      The following portions of the patient's history were reviewed and updated as appropriate: allergies, current medications, past family history, past medical history, past social history, past surgical history and problem list          Objective:    Blood pressure 127/96, pulse 98, temperature (!) 97 1 °F (36 2 °C), temperature source Temporal, resp  rate 16, height 4' 11" (1 499 m), weight 74 8 kg (165 lb), not currently breastfeeding  Physical Exam  Vitals reviewed  Constitutional:       General: She is not in acute distress  HENT:      Head: Normocephalic and atraumatic  Right Ear: External ear normal       Left Ear: External ear normal       Nose: Nose normal       Mouth/Throat:      Mouth: Mucous membranes are dry  Pharynx: Oropharynx is clear  Eyes:      General: Lids are normal          Right eye: No discharge  Left eye: No discharge  Extraocular Movements: Extraocular movements intact  Pupils: Pupils are equal, round, and reactive to light  Cardiovascular:      Rate and Rhythm: Normal rate  Pulses: Normal pulses  Pulmonary:      Effort: Pulmonary effort is normal    Abdominal:      General: There is no distension  Musculoskeletal:      Right lower leg: No edema  Left lower leg: No edema  Neurological:      Mental Status: She is alert  Mental status is at baseline  Deep Tendon Reflexes: Strength normal       Reflex Scores:       Brachioradialis reflexes are 2+ on the right side and 3+ on the left side  Patellar reflexes are 2+ on the right side and 3+ on the left side  Psychiatric:         Mood and Affect: Mood normal          Neurological Exam  Mental Status  Alert  Speech: Difficult to understand-orofacial dyskinesia  Cranial Nerves  CN II: Visual acuity is normal  Visual fields full to confrontation  CN III, IV, VI: Extraocular movements intact bilaterally  Normal lids and orbits bilaterally  Pupils equal round and reactive to light bilaterally    CN V: Facial sensation is normal   CN VII: Full and symmetric facial movement  CN VIII: Hearing is normal   CN IX, X: Palate elevates symmetrically  Normal gag reflex  CN XI: Shoulder shrug strength is normal   CN XII: Tongue midline without atrophy or fasciculations  Motor   Strength is 5/5 throughout all four extremities  Sensory  Light touch is normal in upper and lower extremities  Reflexes                                            Right                      Left  Brachioradialis                    2+                         3+  Patellar                                2+                         3+    Coordination    No dysmetria/tremor on FNF testing is present  Gait  Casual gait: Wide stance  Freezing gait  Unable to rise from chair without using arms  Uses walker  ROS:    Review of Systems   Constitutional: Negative  Negative for appetite change and fever  HENT: Negative  Negative for hearing loss, tinnitus, trouble swallowing and voice change  Eyes: Negative  Negative for photophobia and pain  Respiratory: Negative  Negative for shortness of breath  Cardiovascular: Negative  Negative for palpitations  Gastrointestinal: Negative  Negative for nausea and vomiting  Endocrine: Negative  Negative for cold intolerance  Genitourinary: Negative  Negative for dysuria, frequency and urgency  Musculoskeletal: Negative  Negative for myalgias and neck pain  Skin: Negative  Negative for rash  Neurological: Positive for tremors (LE), speech difficulty ("After taking a med", per daughter), weakness (LE), light-headedness and numbness (Legs)  Negative for dizziness, seizures, syncope, facial asymmetry and headaches  Memory issues  Off balanced  Hematological: Negative  Does not bruise/bleed easily  Psychiatric/Behavioral: Positive for confusion  Negative for hallucinations and sleep disturbance      ROS was reviewed and updated as appropriate

## 2022-08-31 NOTE — PATIENT INSTRUCTIONS
Stop oxycodone use  Reintroduce lyrica at lower dose  If the dizziness/falls/change in gait happen more at night take blood pressures then and take notice to which medication may be a cause and speak with the psychiatrist about this  Continue with PT  MRI/labs for further evaluation of continued memory loss  Continue with lower dose of the ingrezza- 60mg  Do longer EEG for evaluation of seizure like activity  Follow up in 3 months    Fall Prevention   AMBULATORY CARE:   Fall prevention  includes ways to make your home and other areas safer  It also includes ways you can move more carefully to prevent a fall  Health conditions that cause changes in your blood pressure, vision, or muscle strength and coordination may increase your risk for falls  Medicines may also increase your risk for falls if they make you dizzy, weak, or sleepy  Call 911 or have someone else call if:   You have fallen and are unconscious  You have fallen and cannot move part of your body  Contact your healthcare provider if:   You have fallen and have pain or a headache  You have questions or concerns about your condition or care  Fall prevention tips:   Stand or sit up slowly  This may help you keep your balance and prevent falls  Use assistive devices as directed  Your healthcare provider may suggest that you use a cane or walker to help you keep your balance  You may need to have grab bars put in your bathroom near the toilet or in the shower  Wear shoes that fit well and have soles that   Wear shoes both inside and outside  Use slippers with good   Do not wear shoes with high heels  Wear a personal alarm  This is a device that allows you to call 911 if you fall and need help  Ask your healthcare provider for more information  Stay active  Exercise can help strengthen your muscles and improve your balance  Your healthcare provider may recommend water aerobics or walking   He or she may also recommend physical therapy to improve your coordination  Never start an exercise program without talking to your healthcare provider first          Manage your medical conditions  Keep all appointments with your healthcare providers  Visit your eye doctor as directed  Home safety tips: Add items to prevent falls in the bathroom  Put nonslip strips on your bath or shower floor to prevent you from slipping  Use a bath mat if you do not have carpet in the bathroom  This will prevent you from falling when you step out of the bath or shower  Use a shower seat so you do not need to stand while you shower  Sit on the toilet or a chair in your bathroom to dry yourself and put on clothing  This will prevent you from losing your balance from drying or dressing yourself while you are standing  Keep paths clear  Remove books, shoes, and other objects from walkways and stairs  Place cords for telephones and lamps out of the way so that you do not need to walk over them  Tape them down if you cannot move them  Remove small rugs  If you cannot remove a rug, secure it with double-sided tape  This will prevent you from tripping  Install bright lights in your home  Use night lights to help light paths to the bathroom or kitchen  Always turn on the light before you start walking  Keep items you use often on shelves within reach  Do not use a step stool to help you reach an item  Paint or place reflective tape on the edges of your stairs  This will help you see the stairs better  Follow up with your doctor as directed:  Write down your questions so you remember to ask them during your visits  © Copyright [employer] 2022 Information is for End User's use only and may not be sold, redistributed or otherwise used for commercial purposes  All illustrations and images included in CareNotes® are the copyrighted property of [employer]  or Luis M Aleman   The above information is an  only   It is not intended as medical advice for individual conditions or treatments  Talk to your doctor, nurse or pharmacist before following any medical regimen to see if it is safe and effective for you

## 2022-09-12 DIAGNOSIS — F43.10 POST TRAUMATIC STRESS DISORDER: ICD-10-CM

## 2022-09-13 RX ORDER — PRAZOSIN HYDROCHLORIDE 2 MG/1
2 CAPSULE ORAL
Qty: 30 CAPSULE | Refills: 0 | Status: SHIPPED | OUTPATIENT
Start: 2022-09-13 | End: 2022-10-12

## 2022-09-15 RX ORDER — VALBENAZINE 80 MG/1
80 CAPSULE ORAL DAILY
Qty: 30 CAPSULE | Refills: 3 | Status: CANCELLED | OUTPATIENT
Start: 2022-09-15

## 2022-09-15 NOTE — TELEPHONE ENCOUNTER
Recv'd refill request for Ingrezza 80 mg capsules  Takes 1 daily Qty of 30    Request recv'd from [address] [place] [address], patient is down to 1 capsule left      # [phone]    Last OV 8/31/22  Next appt is scheduled 12/12/2022

## 2022-09-16 ENCOUNTER — PATIENT OUTREACH (OUTPATIENT)
Dept: INTERNAL MEDICINE CLINIC | Facility: CLINIC | Age: 57
End: 2022-09-16

## 2022-09-16 NOTE — PROGRESS NOTES
Outpatient Care Management Note:    Received notification from TyreseSanto, clerical staff that patient had calling looking for phone number for audiology but could not write down the number at the time and requested if I could further follow up with patient  I called patient and no answer  Message left this is the nurse Fran Hankins with her PCP office  Patient seen by Neurology in August and referred to audiology  I provided the phone number to schedule [phone] and provided address [address] in [place]  I did remind patient of upcoming ortho appointment on Thursday 9/22 and encouraged patient to start attending outpatient physical therapy  I did request a call back and left my contact number [phone]  Per chart review Neurology on 8/31/22 decreased Lyrica dose from 150 mg 3 times a day to 50 mg twice a day and may increase to 3 times per day if needed  Patient ordered MRI of brain and 48 hour EEG and bloodwork  Patient to be taking Ingrezza 60 mg  Patient to stop Oxycodone  Patient to follow up with Neurology in 3 months

## 2022-09-19 ENCOUNTER — PATIENT OUTREACH (OUTPATIENT)
Dept: INTERNAL MEDICINE CLINIC | Facility: CLINIC | Age: 57
End: 2022-09-19

## 2022-09-19 NOTE — PROGRESS NOTES
Received call from patient who thought I was calling her  I made her aware I did not reach out to her again after speaking with her this morning  Patient reports she forgot her appointment info for 9/22 and could not understand the info she wrote down  She is requesting now if I can speak further with her son in [facility] at [phone]  Patient reports he walked into the room and I was able to speak with him at this time  I reviewed my role with him and reason for outreach  He is aware of ortho appointment this week and I gave in the date, time, address and phone number  He was not aware of upcoming PT appointment on  9/26  I did review patient was scheduled for 9/6 and canceled, 9/15 and canceled, and now for 9/26 and encouraged she attend appointment for them to start working on range of motion and strengthening exercises  I gave him the address and phone number to PT location on [facility] in [facility]  I reviewed Neurology visit which he reports he was at  I reviewed with him patient should be taking Lyrica/Pregablin 50 mg twice a day and can increase to three times a day if needed and should continue with Ingrezza 60 mg daily  He will review her medication and call me back with further questions or concerns  I did make him aware patient due for follow up with PCP office and medication reconciliation needed  Explained this requires patient to come to an in person visit with PCP office and have family member/caregiver present  He will talk with his wife and will call PCP office back to schedule appointment  He is aware of patient's mental health appointment tomorrow 9/20  I did review with him at at Neurology appointment patient was ordered to have MRI of brain and EEG completed  I gave him central scheduling's phone number [phone] to call and schedule  He preferred to call so it can be when he is able to take patient   Encouraged to call me back if he has any difficulty scheduling  Reviewed need for bloodwork to be completed  He did not recall MRI, EEG, or bloodwork being discussed at visit with Neurology  I did review with him bloodwork should be fasting for at least 8 hours and no appointment needed and can walk in at [address]  Son in law/caregiver was appreciative of the outreach and explaining and reviewing patient's appointment info and follow up needed  He would be open for further outreach and patient agreeable to this  He has my contact number [phone] and encouraged to call with further questions, concerns, or needs

## 2022-09-19 NOTE — PROGRESS NOTES
Outpatient Care Management Note:    Received call from patient wanting to review phone number or audiology that I left in message for her [phone] and she reports she can call to schedule  Patient wanted to review her ortho appointment info  Reviewed she is scheduled Thursday 9/22 @ 10 am at [address] and phone number [phone]  Patient reports her son in law/paid caregiver will be taking her to appointment  Strongly encouraged patient to attend physical therapy appointment on 9/26 @ 9:30 am     Patient reports she has a psychiatrist appointment tomorrow 9/20  Patient requesting PCP appointment for next week  Patient denies any new issues or concerns  I did make her aware she is overdue to follow up and have been requesting for her to bring ALL medication bottles with her to appointment so thorough med rec can be completed and updated list of her mental health medications  I requested to speak with her daughter or son in law but patient declined at this time  Encouraged that she must have someone present with her for appointment  Will have clerical team reach out to schedule  Patient aware may not be next week and is aware PCP not back in office until end of Oct/beginning of November and no appointments currently available with PCP  Patient does not have any further questions, concerns, or other needs at this time  Pt has my contact # and PCP office # if needed  Pt is agreeable for further outreach
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
## 2022-09-26 ENCOUNTER — EVALUATION (OUTPATIENT)
Dept: PHYSICAL THERAPY | Facility: CLINIC | Age: 57
End: 2022-09-26
Payer: MEDICARE

## 2022-09-26 DIAGNOSIS — Z96.653 STATUS POST BILATERAL KNEE REPLACEMENTS: ICD-10-CM

## 2022-09-26 PROCEDURE — 97110 THERAPEUTIC EXERCISES: CPT | Performed by: PHYSICAL THERAPIST

## 2022-09-26 PROCEDURE — 97161 PT EVAL LOW COMPLEX 20 MIN: CPT | Performed by: PHYSICAL THERAPIST

## 2022-09-26 NOTE — PROGRESS NOTES
PT Evaluation     Today's date: 2022  Patient name: Tanja James  : 1965  MRN: [id number]  Referring provider: Duane Gentry,*  Dx:   Encounter Diagnosis     ICD-10-CM    1  Status post bilateral knee replacements  N85 [id number] Ambulatory referral to Physical Therapy                  Assessment  Assessment details: Pt is a 60y o  year old female presenting to physical therapy for Status post bilateral knee replacements, L TKA on 22, and R TKA on 3/9/22  She presents with the following impairments: gait abnormalities using rollator, balance disturbances, b/le LE weakness, b/l knee ROM deficits, and TTP along L knee jt line affecting her function with walking, standing, navigating stairs, transferring out of chair, walking from room to room, and bending over  Pt will benefit from skilled physical therapy to address functional limitations noted in evaluation and meet patient goals  Impairments: abnormal gait, abnormal muscle firing, abnormal or restricted ROM, abnormal movement, activity intolerance, impaired balance, impaired physical strength, lacks appropriate home exercise program, pain with function and poor body mechanics    Symptom irritability: moderate  Goals  ST  Pt will reduce pain to 4/10 at rest   2  Pt will improve b/l knee extension AROM to 5 degrees or better  LT  Pt will demonstrate good quad activation b/l   2  Pt will reduce TUG time to less than 20 secs w LRAD for improved functional mobility  3  Pt will improve b/l quad strength to 4/5 or greater for improved ability to navigate steps  4   Pt will be I w HEP      Plan  Patient would benefit from: PT eval and skilled physical therapy  Planned modality interventions: biofeedback, cryotherapy, electrical stimulation/Russian stimulation, TENS, thermotherapy: hydrocollator packs and unattended electrical stimulation  Planned therapy interventions: abdominal trunk stabilization, joint mobilization, manual therapy, Colten taping, balance, balance/weight bearing training, neuromuscular re-education, body mechanics training, patient education, strengthening, stretching, therapeutic activities, therapeutic exercise, flexibility, functional ROM exercises, gait training and home exercise program  Frequency: 2x week  Duration in weeks: 6  Treatment plan discussed with: patient and family        Subjective Evaluation    History of Present Illness  Date of surgery: 2022  Mechanism of injury: surgery  Mechanism of injury: Pt underwent L TKA on 22 and R TKA on 3/9/22  She reports falling a few days ago on her butt after getting up really quick  She has some difficulty walking due to poor balance and weakness in her legs  She is no longer on the tramadol, but tries to take tylenol to help with the pain  She does not have a cane at home, and has some difficulty getting the rollator walker through some doorways at home  Recurrent probem    Pain  Current pain ratin          Objective     Tenderness   Left Knee   Tenderness in the lateral joint line  No tenderness in the medial joint line  Right Knee   No tenderness in the lateral joint line and medial joint line       Active Range of Motion   Left Knee   Flexion: 92 degrees with pain  Extension: 10 degrees with pain    Right Knee   Flexion: 120 degrees   Extension: 12 degrees   Extensor la degrees     Passive Range of Motion   Left Knee   Flexion: 108 degrees with pain  Extension: 8 degrees with pain    Right Knee   Flexion: 122 degrees   Extension: 5 degrees     Strength/Myotome Testing     Left Knee   Flexion: 4-  Extension: 4-  Quadriceps contraction: poor    Right Knee   Flexion: 3+  Extension: 4-  Quadriceps contraction: poor    Additional Strength Details  R hip flex = 3/5  L hip flex = 3+/5    Tests     Additional Tests Details  TUG = 39 secs w rollator    NBOS = 8 secs  NBOS EC < 3 secs    Ambulation   Weight-Bearing Status   Assistive device used: rollator walker with seat    Observational Gait   Gait: asymmetric   Decreased walking speed and stride length         Flowsheet Rows    Flowsheet Row Most Recent Value   PT/OT G-Codes    Current Score 29   Projected Score 50             Precautions: L TKA on 7/5/22,  R TKA on 3/9/22    Date 9/26            Visit # 1            FOTO 29/50             Re-eval IE              Manuals 9/26            B/l knee PROM SF                                                   Neuro Re-Ed 9/26            NBOS             AIrex             Clams             Bridges                                                    Ther Ex 9/26            Bike             Quad sets 5x10"            SLR 10x ea            LAQ's 10x ea            Standing hip ABD 10x ea            Leg Press                                       Ther Activity             Mini squats                          Gait Training                                       Modalities

## 2022-10-10 ENCOUNTER — PATIENT OUTREACH (OUTPATIENT)
Dept: INTERNAL MEDICINE CLINIC | Facility: CLINIC | Age: 57
End: 2022-10-10

## 2022-10-10 NOTE — PROGRESS NOTES
Outpatient Care Management Note:    Received staff message that patient is scheduled with PCP office for Wednesday 10/12/22 @ 9:30 am  I called patient and she is aware of appointment and reports she made her son in law aware but requested I can call him to review info  Instructed patient to bring all medication bottles with her to the appointment including her mental health medication as med rec needs to be completed  I did review with patient that she canceled ortho appointment on 9/22/22 and no showed on 10/6/22 and has no showed to multiple PT sessions and is s/p bilateral knee replacements  Patient had initial PT eval on 9/26/22 but failed to follow up after that  Patient encouraged to follow up with ortho office and reschedule appointment  Patient wants to discuss about in home PT  I did encourage her outpatient PT would be more beneficial      Patient reports she and her daughter's family recently moved to another house in [facility]  Patient unable to give me exact address to update chart and states on [facility]  She reports no steps outside the home but reports a flight of steps to get up to her room and the bathroom  Patient does not have any further questions, concerns, or other needs at this time  Pt has my contact # and PCP office # if needed  Pt is agreeable for further outreach  I called patient's son in law/paid caregiver Baldemar at [phone] and no answer and message left that this is the nurse Leno Alejandra calling from [facility] PCP office  Reminded him we spoke last month  Made him aware of patient's appointment for Wednesday and strongly encouraged he come and be present to appointment and have patient bring all of her medication with her  I left my contact number if needed

## 2022-10-12 ENCOUNTER — PATIENT OUTREACH (OUTPATIENT)
Dept: INTERNAL MEDICINE CLINIC | Facility: CLINIC | Age: 57
End: 2022-10-12

## 2022-10-12 ENCOUNTER — OFFICE VISIT (OUTPATIENT)
Dept: INTERNAL MEDICINE CLINIC | Facility: CLINIC | Age: 57
End: 2022-10-12

## 2022-10-12 ENCOUNTER — APPOINTMENT (OUTPATIENT)
Dept: LAB | Facility: CLINIC | Age: 57
End: 2022-10-12
Payer: MEDICARE

## 2022-10-12 VITALS
HEART RATE: 76 BPM | DIASTOLIC BLOOD PRESSURE: 85 MMHG | HEIGHT: 59 IN | BODY MASS INDEX: 34.68 KG/M2 | SYSTOLIC BLOOD PRESSURE: 131 MMHG | TEMPERATURE: 97.9 F | WEIGHT: 172 LBS

## 2022-10-12 DIAGNOSIS — Z96.651 STATUS POST TOTAL KNEE REPLACEMENT, RIGHT: ICD-10-CM

## 2022-10-12 DIAGNOSIS — R41.3 MEMORY LOSS: ICD-10-CM

## 2022-10-12 DIAGNOSIS — R56.9 SEIZURE-LIKE ACTIVITY (HCC): ICD-10-CM

## 2022-10-12 DIAGNOSIS — I69.359 HEMIPLEGIA, POST-STROKE (HCC): ICD-10-CM

## 2022-10-12 DIAGNOSIS — E87.6 HYPOKALEMIA: ICD-10-CM

## 2022-10-12 DIAGNOSIS — Z23 NEED FOR INFLUENZA VACCINATION: Primary | ICD-10-CM

## 2022-10-12 DIAGNOSIS — Z86.73 HISTORY OF CVA (CEREBROVASCULAR ACCIDENT): ICD-10-CM

## 2022-10-12 DIAGNOSIS — F11.29 OPIOID DEPENDENCE WITH UNSPECIFIED OPIOID-INDUCED DISORDER (HCC): ICD-10-CM

## 2022-10-12 LAB
ALBUMIN SERPL BCP-MCNC: 4.2 G/DL (ref 3.5–5)
ALP SERPL-CCNC: 111 U/L (ref 46–116)
ALT SERPL W P-5'-P-CCNC: 17 U/L (ref 12–78)
ANION GAP SERPL CALCULATED.3IONS-SCNC: 5 MMOL/L (ref 4–13)
AST SERPL W P-5'-P-CCNC: 9 U/L (ref 5–45)
BASOPHILS # BLD AUTO: 0.03 THOUSANDS/ΜL (ref 0–0.1)
BASOPHILS NFR BLD AUTO: 1 % (ref 0–1)
BILIRUB SERPL-MCNC: 1.4 MG/DL (ref 0.2–1)
BUN SERPL-MCNC: 6 MG/DL (ref 5–25)
CALCIUM SERPL-MCNC: 9.2 MG/DL (ref 8.3–10.1)
CHLORIDE SERPL-SCNC: 106 MMOL/L (ref 96–108)
CO2 SERPL-SCNC: 27 MMOL/L (ref 21–32)
CREAT SERPL-MCNC: 0.69 MG/DL (ref 0.6–1.3)
EOSINOPHIL # BLD AUTO: 0.12 THOUSAND/ΜL (ref 0–0.61)
EOSINOPHIL NFR BLD AUTO: 3 % (ref 0–6)
ERYTHROCYTE [DISTWIDTH] IN BLOOD BY AUTOMATED COUNT: 14.2 % (ref 11.6–15.1)
FOLATE SERPL-MCNC: >20 NG/ML (ref 3.1–17.5)
GFR SERPL CREATININE-BSD FRML MDRD: 96 ML/MIN/1.73SQ M
GLUCOSE P FAST SERPL-MCNC: 102 MG/DL (ref 65–99)
HCT VFR BLD AUTO: 44.3 % (ref 34.8–46.1)
HGB BLD-MCNC: 14.6 G/DL (ref 11.5–15.4)
IMM GRANULOCYTES # BLD AUTO: 0 THOUSAND/UL (ref 0–0.2)
IMM GRANULOCYTES NFR BLD AUTO: 0 % (ref 0–2)
LYMPHOCYTES # BLD AUTO: 1.17 THOUSANDS/ΜL (ref 0.6–4.47)
LYMPHOCYTES NFR BLD AUTO: 29 % (ref 14–44)
MCH RBC QN AUTO: 28.5 PG (ref 26.8–34.3)
MCHC RBC AUTO-ENTMCNC: 33 G/DL (ref 31.4–37.4)
MCV RBC AUTO: 86 FL (ref 82–98)
MONOCYTES # BLD AUTO: 0.28 THOUSAND/ΜL (ref 0.17–1.22)
MONOCYTES NFR BLD AUTO: 7 % (ref 4–12)
NEUTROPHILS # BLD AUTO: 2.38 THOUSANDS/ΜL (ref 1.85–7.62)
NEUTS SEG NFR BLD AUTO: 60 % (ref 43–75)
NRBC BLD AUTO-RTO: 0 /100 WBCS
PLATELET # BLD AUTO: 314 THOUSANDS/UL (ref 149–390)
PMV BLD AUTO: 8.6 FL (ref 8.9–12.7)
POTASSIUM SERPL-SCNC: 3.4 MMOL/L (ref 3.5–5.3)
POTASSIUM UR-SCNC: 18.7 MMOL/L
PROT SERPL-MCNC: 7.7 G/DL (ref 6.4–8.4)
RBC # BLD AUTO: 5.13 MILLION/UL (ref 3.81–5.12)
SODIUM SERPL-SCNC: 138 MMOL/L (ref 135–147)
TSH SERPL DL<=0.05 MIU/L-ACNC: 1.27 UIU/ML (ref 0.45–4.5)
VIT B12 SERPL-MCNC: 1136 PG/ML (ref 100–900)
WBC # BLD AUTO: 3.98 THOUSAND/UL (ref 4.31–10.16)

## 2022-10-12 PROCEDURE — 36415 COLL VENOUS BLD VENIPUNCTURE: CPT

## 2022-10-12 PROCEDURE — 99214 OFFICE O/P EST MOD 30 MIN: CPT | Performed by: INTERNAL MEDICINE

## 2022-10-12 PROCEDURE — 90682 RIV4 VACC RECOMBINANT DNA IM: CPT | Performed by: INTERNAL MEDICINE

## 2022-10-12 PROCEDURE — 3075F SYST BP GE 130 - 139MM HG: CPT | Performed by: INTERNAL MEDICINE

## 2022-10-12 PROCEDURE — 84244 ASSAY OF RENIN: CPT

## 2022-10-12 PROCEDURE — 82746 ASSAY OF FOLIC ACID SERUM: CPT

## 2022-10-12 PROCEDURE — G0008 ADMIN INFLUENZA VIRUS VAC: HCPCS | Performed by: INTERNAL MEDICINE

## 2022-10-12 PROCEDURE — 80053 COMPREHEN METABOLIC PANEL: CPT

## 2022-10-12 PROCEDURE — 84443 ASSAY THYROID STIM HORMONE: CPT

## 2022-10-12 PROCEDURE — 3079F DIAST BP 80-89 MM HG: CPT | Performed by: INTERNAL MEDICINE

## 2022-10-12 PROCEDURE — 82607 VITAMIN B-12: CPT

## 2022-10-12 PROCEDURE — 82088 ASSAY OF ALDOSTERONE: CPT

## 2022-10-12 PROCEDURE — 85025 COMPLETE CBC W/AUTO DIFF WBC: CPT

## 2022-10-12 RX ORDER — ACETAMINOPHEN 500 MG
500 TABLET ORAL EVERY 6 HOURS PRN
Qty: 120 TABLET | Refills: 1 | Status: SHIPPED | OUTPATIENT
Start: 2022-10-12

## 2022-10-12 RX ORDER — ASPIRIN 81 MG/1
81 TABLET ORAL DAILY
Qty: 90 TABLET | Refills: 3 | Status: SHIPPED | OUTPATIENT
Start: 2022-10-12

## 2022-10-12 NOTE — PROGRESS NOTES
HIRAM met with pt , son in law/ paid caregiver Baldemar , 2 ylo jairo,  Jose Armando Lisa RN/CM and Dr Ruchi French @ her PCP appointment this date  PCP and Jose Armando Lisa RN/CM have completed her medication reconciliation and have stressed her next steps for her medical care  SW has inquired if she is continuing with her  [facility] at [address]? She shares she is going to in office appointments  Pt now denies need for an ICM services  She is active with the [place] Waiver and her family members are her caregiver  No new needs identified at this time  Please re-consult HIRAM if needed

## 2022-10-12 NOTE — PROGRESS NOTES
[facility]  INTERNAL MEDICINE OFFICE VISIT     PATIENT INFORMATION     Melody D Ander Osgood   60 y o  female   MRN: [id number]    ASSESSMENT/PLAN     1  Need for influenza vaccination  -     influenza vaccine, quadrivalent, recombinant, PF, 0 5 mL, for patients 18 yr+ (FLUBLOK)    2  Status post total knee replacement, right  -     acetaminophen (Acetaminophen Extra Strength) 500 mg tablet; Take 1 tablet (500 mg total) by mouth every 6 (six) hours as needed for mild pain  She and family will call ortho office for follow up and PT recs    3  History of CVA (cerebrovascular accident)  -     aspirin (ECOTRIN LOW STRENGTH) 81 mg EC tablet; Take 1 tablet (81 mg total) by mouth daily  On statin, BP control  Follows up with neuro    4  Hemiplegia, post-stroke ([facility] [address] )  -see above    5  Opioid dependence with unspecified opioid-induced disorder ([facility] [address] )  -patient not on opioid since surgery          Today 's visit for Med rec  Next visit to focus on adherence and medical follow up for chronic conditions  Needs to do labs ordered by neuro      Schedule a follow-up appointment in 6-8 weeks with resident or PCP  HEALTH MAINTENANCE     Immunization History   Administered Date(s) Administered   • Influenza, recombinant, quadrivalent,injectable, preservative free 10/12/2022   • Influenza, seasonal, injectable 10/02/2013, 11/20/2014, 10/26/2015   • Tdap 07/25/2014     Immunizations:  · 4th COVID   Screening:  · Mammogram ordered today, needs gyn, CRC screening, DEXA, BMI done today   BMI Counseling: Body mass index is 34 74 kg/m²  The BMI is above normal  Nutrition recommendations include consuming healthier snacks  Exercise recommendations include exercising 3-5 times per week  No pharmacotherapy was ordered  Patient referred to PCP  Rationale for BMI follow-up plan is due to patient being overweight or obese           CHIEF COMPLAINT     Chief Complaint   Patient presents with   • Follow-up     Med reconciliation      HISTORY OF PRESENT ILLNESS     Samantha is here with her grandson and son in law who helps to provide history  Also present for encounter include Kofi Sky RN, Bhaskar Hugo MA and [address] reason for visit was to go over med rec  Staff helped with consolidating her medications which were brought in several bags and mixed together  We verified with med list and made updates in med rec  She does see psychiatry and PDMP checked  Also saw Neuro in last few months and has EEG and labs to do  She sees them again in December  She has not been going to PT and discussed with her and family today that she needs to see ortho again for eval as to what level of PT needed after her knee surgeries  She is requesting at home PT however family verifies there is little room in her space  Now and would benefit to outpatient PT  Patient reluctant  We discussed with  Her son in law about calling ortho office as well as to call to schedule EEG through neuro  Last AVS from neuro appt provided to her son in law  Labs are pending which she will go this week for  She was advised to also schedule her mammogram  Further screenings can be done at future visits as she requires close, frequent follow up for next few encounters  She did get her flu shot today in office  Today, she complaints of some knee pain bl as well as aches in her back  She says occasionally she has issues with swallowing but not choking on anything and no major weight loss  REVIEW OF SYSTEMS     Review of Systems   Constitutional: Positive for fatigue  Negative for chills, fever and unexpected weight change  HENT: Positive for trouble swallowing  Respiratory: Negative for cough, choking, chest tightness and shortness of breath  Cardiovascular: Negative for chest pain  Gastrointestinal: Negative for abdominal pain  Musculoskeletal: Positive for back pain  Psychiatric/Behavioral: Positive for behavioral problems  OBJECTIVE     Vitals:    10/12/22 0952   BP: 131/85   BP Location: Right arm   Patient Position: Sitting   Cuff Size: Large   Pulse: 76   Temp: 97 9 °F (36 6 °C)   TempSrc: Temporal   Weight: 78 kg (172 lb)   Height: 4' 11" (1 499 m)     Physical Exam  Vitals reviewed  Constitutional:       General: She is not in acute distress  Comments: Appears tired    HENT:      Head: Normocephalic and atraumatic  Mouth/Throat:      Mouth: Mucous membranes are moist    Eyes:      General: No scleral icterus  Cardiovascular:      Rate and Rhythm: Normal rate and regular rhythm  Pulses: Normal pulses  Heart sounds: Normal heart sounds  No murmur heard  Pulmonary:      Effort: Pulmonary effort is normal  No respiratory distress  Breath sounds: Normal breath sounds  No wheezing  Musculoskeletal:         General: Tenderness present  Right lower leg: No edema  Left lower leg: No edema  Comments: Tenderness over knees bl, crepitus noted   Neurological:      Mental Status: She is alert     Psychiatric:         Mood and Affect: Mood normal          Behavior: Behavior normal       Comments: Questionable insight and judgement (unclear on meds, follow up etc)       CURRENT MEDICATIONS     Current Outpatient Medications:   •  acetaminophen (Acetaminophen Extra Strength) 500 mg tablet, Take 1 tablet (500 mg total) by mouth every 6 (six) hours as needed for mild pain, Disp: 120 tablet, Rfl: 1  •  amLODIPine (NORVASC) 10 mg tablet, TAKE 1 TABLET BY MOUTH EVERY DAY, Disp: 90 tablet, Rfl: 3  •  ascorbic acid (VITAMIN C) 500 MG tablet, Take 1 tablet (500 mg total) by mouth daily, Disp: 30 tablet, Rfl: 1  •  aspirin (ECOTRIN LOW STRENGTH) 81 mg EC tablet, Take 1 tablet (81 mg total) by mouth daily, Disp: 90 tablet, Rfl: 3  •  atorvastatin (LIPITOR) 40 mg tablet, TAKE 1 TABLET BY MOUTH EVERY DAY, Disp: 90 tablet, Rfl: 3  •  benztropine (COGENTIN) 1 mg tablet, Take 1 mg by mouth 2 (two) times a day, Disp: , Rfl:   •  busPIRone (BUSPAR) 10 mg tablet, Take 20 mg by mouth 3 (three) times a day , Disp: , Rfl:   •  calcium carbonate (TUMS) 500 mg chewable tablet, Chew 2 tablets every 8 (eight) hours as needed, Disp: , Rfl:   •  docusate sodium (COLACE) 100 mg capsule, Take 1 capsule (100 mg total) by mouth 2 (two) times a day, Disp: 20 capsule, Rfl: 0  •  ferrous sulfate 324 (65 Fe) mg, Take 1 tablet (324 mg total) by mouth daily, Disp: 30 tablet, Rfl: 1  •  folic acid (FOLVITE) 1 mg tablet, TAKE 1 TABLET BY MOUTH EVERY DAY, Disp: 90 tablet, Rfl: 1  •  hydrOXYzine HCL (ATARAX) 50 mg tablet, Take 50 mg by mouth 3 (three) times a day as needed for itching, Disp: , Rfl:   •  lithium carbonate (LITHOBID) 300 mg CR tablet, Take 1 tablet (300 mg total) by mouth daily at bedtime, Disp: 30 tablet, Rfl: 0  •  LORazepam (ATIVAN) 0 5 mg tablet, Take 1 tablet (0 5 mg total) by mouth 2 (two) times a day as needed for anxiety for up to 10 days, Disp: 10 tablet, Rfl: 0  •  mirtazapine (REMERON) 7 5 MG tablet, Take 7 5 mg by mouth every evening, Disp: , Rfl:   •  Multiple Vitamin (multivitamin) tablet, Take 1 tablet by mouth daily, Disp: 30 tablet, Rfl: 1  •  naloxone (NARCAN) 4 mg/0 1 mL nasal spray, Administer 1 spray into a nostril  If no response after 2-3 minutes, give another dose in the other nostril using a new spray , Disp: 1 each, Rfl: 1  •  pantoprazole (PROTONIX) 20 mg tablet, Take 20 mg by mouth daily, Disp: , Rfl:   •  PARoxetine (PAXIL) 30 mg tablet, Take 60 mg by mouth 2 (two) times a day  Takes two (60 mg) tabs by mouth nightly   Indications: Social Anxiety Disorder, Disp: , Rfl:   •  prazosin (MINIPRESS) 2 mg capsule, Take 2 mg by mouth daily at bedtime  Indications: Frightening Dreams, Disp: , Rfl:   •  pregabalin (LYRICA) 50 mg capsule, 1 capsule twice a day  May increase to 1 capsule 3 times per day if needed  , Disp: 90 capsule, Rfl: 2  •  propranolol (INDERAL) 10 mg tablet, Take 1 tablet (10 mg total) by mouth 2 (two) times a day before breakfast and lunch (Patient taking differently: Take 20 mg by mouth 2 (two) times a day before breakfast and lunch), Disp: , Rfl: 0  •  QUEtiapine (SEROquel) 25 mg tablet, Take 50 mg by mouth daily at bedtime  Indications: Generalized Anxiety Disorder, Disp: , Rfl:   •  Valbenazine Tosylate (Ingrezza) 80 MG CAPS, Take by mouth, Disp: , Rfl:     PAST MEDICAL & SURGICAL HISTORY     Past Medical History:   Diagnosis Date   • Anxiety    • Arthritis     left knee   • At risk for falls    • Bipolar 2 disorder ([facility] [address] )     FOLLOWS WITH PSYCHIATRIST  CONTINUE LAMOTRIGINE; RESOLVED: [id number]   • Depression    • Familial tremor     both hands   • Fibromyalgia     LAST ASSESSED: [id number]   • GERD (gastroesophageal reflux disease)    • Hearing aid worn     left ear   • [place] (hard of hearing)     left ear   • Hyperlipidemia    • Hypertension    • Left-sided weakness    • Memory loss of unknown cause     long and short term   • Migraine    • Obesity    • Obesity, Class II, BMI 35-39 9    • Overactive bladder    • Panic attack    • Post traumatic stress disorder    • S/P insertion of spinal cord stimulator     no remote   • Seasonal allergies    • Seizures (HCC)     possible seizure like activity   • Stroke [facility])     questionable stroke 2009   • Tardive dyskinesia     PATIENT STATES   • 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    CONT ASA 81 MG DAILY; RESOLVED: [id number]   • Urinary incontinence    • Uses walker    • Wears dentures     partial lower / full upper- doesnt wear   • Wears glasses      Past Surgical History:   Procedure Laterality Date   • BACK SURGERY     • [address]   • COLONOSCOPY      RESOLVED: [id number]   • EAR SURGERY     • EGD     • HYSTERECTOMY  2004   • MYRINGOTOMY W/ TUBES Left    • NECK SURGERY  04/2019   • DE CYSTOURETHROSCOPY N/A 2/18/2016    Procedure: CYSTOSCOPY, BOTOX INJECTION;  Surgeon: Thuy Zambrano MD;  Location: [facility];  Service: Gynecology   • LA IMPLANT SPINAL NEUROSTIM/ Right 2/10/2021    Procedure: REPLACEMENT IMPLANTABLE PULSE GENERATOR DORSAL SPINAL COLUMN STIMULATOR, RIGHT;  Surgeon: Allison Kehr, MD;  [facility];  Service: Neurosurgery   • LA PERCUT IMPLNT [address] Right 7/28/2020    Procedure: INSERTION THORACIC DORSAL COLUMN SPINAL CORD STIMULATOR PERCUTANEOUS W IMPLANTABLE PULSE GENERATOR, RIGHT;  Surgeon: Allison Kehr, MD;  Location: [facility];  Service: Neurosurgery   • LA TOTAL KNEE ARTHROPLASTY Right 3/9/2022    Procedure: ARTHROPLASTY KNEE TOTAL;  Surgeon: David Beltran DO;  [facility];  Service: Orthopedics   • LA TOTAL KNEE ARTHROPLASTY Left 7/5/2022    Procedure: ARTHROPLASTY KNEE TOTAL;  Surgeon: David Beltran DO;  [facility];  Service: Orthopedics   • TONSILLECTOMY     • TUBAL LIGATION  1986   • UPPER GASTROINTESTINAL ENDOSCOPY  09/2020     SOCIAL & FAMILY HISTORY     Social History     Socioeconomic History   • Marital status:      Spouse name: Not on file   • Number of children: 2   • Years of education: graduate school    • Highest education level: Not on file   Occupational History   • Occupation: Disabled   Tobacco Use   • Smoking status: Never Smoker   • Smokeless tobacco: Never Used   Vaping Use   • Vaping Use: Never used   Substance and Sexual Activity   • Alcohol use: Not Currently     Comment: 2 x year; being a social drinker as per all scripts    • Drug use: No   • Sexual activity: Not Currently   Other Topics Concern   • Not on file   Social History Narrative    Bereavement    Daily caffeine consumption, 6-8 servings per day     as per all scripts    Lives in [address] [place] [address] Determinants of Health     Financial Resource Strain: Low Risk    • Difficulty of Paying Living Expenses: Not hard at all   Food Insecurity: No Food Insecurity   • Worried About [address] [facility] in the Last Year: Never true   • Ran Out of Food in the Last Year: Never true   Transportation Needs: No Transportation Needs   • Lack of Transportation (Medical): No   • Lack of Transportation (Non-Medical): No   Physical Activity: Not on file   Stress: Not on file   Social Connections: Not on file   Intimate Partner Violence: Not on file   Housing Stability: Low Risk    • Unable to Pay for Housing in the Last Year: No   • Number of Places Lived in the Last Year: 1   • Unstable Housing in the Last Year: No     Social History     Substance and Sexual Activity   Alcohol Use Not Currently    Comment: 2 x year; being a social drinker as per all scripts      Social History     Substance and Sexual Activity   Drug Use No     Social History     Tobacco Use   Smoking Status Never Smoker   Smokeless Tobacco Never Used     Family History   Problem Relation Age of Onset   • Colon cancer Mother    • Alzheimer's disease Father    • Stroke Father    • Colon cancer Brother    • Bipolar disorder Brother    • Breast cancer Maternal Aunt    • Colon cancer Maternal Aunt    • Heart disease Other    • Diabetes Other    • Hypertension Other    • Seizures Son    • Depression Paternal Grandfather    • No Known Problems Sister    • No Known Problems Brother    • Thyroid disease Neg Hx      ==  Brando Mazariegos DO    [facility] [address]   [facility] , [address], [address] [facility]  Office: [phone]  Fax: [phone]

## 2022-10-12 NOTE — PATIENT INSTRUCTIONS
It was good to see you today:  It was great to meet your family  Today we are reviewing your meds  I re ordered the mammogram to get done  Please call the orthopedics office to get an appointment   Get the neurology orders, labs and EEG     We printed the last neuro note to give to your son in law and helped you with your medications today

## 2022-10-12 NOTE — PROGRESS NOTES
Outpatient Care Management Note:    Patient is at PCP office today and presents using roller walker  Present with her is her son in law/paid care giver Baldemar and non school aged grandson  They did bring all medication with them today  Many empty bottles and  medication and some medication mixed in with other meds  Medication was reviewed with provider, patient and son in law today  Patient is taking the following medications  Medication PCP office prescribing:     Acetaminophen (Tylenol) 500 mg every 6 hours as needed    Amlodipine 10 mg daily     Atorvastatin 40 mg daily     Calcium carbonate (tums) 500 mg every 8 hours if needed (patient reports has these at home and did not bring today)     Docusate Sodium (colace) 100 mg (per patient has at home and did not bring with and only taking as needed)     Ferrous Sulfate 626 mg daily     Folic acid 1 mg daily     Multi Vitamin daily     Pantoprazole (Protonix) 20 mg daily    Patient was started on aspirin 81 mg daily today       Ascorbic acid (Vitamin C), Lovenox, Omeprazole, and Senna removed from med list  Patient also had methocarbamol 500 mg that had mixed medication in it and aspirin 325 mg that was filled in February that will be discarded  Neurology Medication:      Pregabalin (Lyrica) 50 mg capsules  Take 1 capsule twice a day   May increase to 1 capsule three times a day if needed  Valbenazine (Ingrezza) 80 mg daily  (Patient has 80 mg tablets  Per Neurology note on 22 patient to be taking 60 mg only)         Mental Health Medication:    Benztropine (cogentin) 1 mg tablet  Take 1 mg twice a day     Buspirone (buspar) 10 mg tablets  Take 20 mg three times a day     Hydroxyzine (Atarax) 50 mg three times a day as needed for anxiety     Lithium carbonate 300 mg at bedtime     Lorazepam (Ativan) 0 5 mg twice a day as needed for anxiety     Mirtazapine (Remeron) 7 5 mg every evening     Paroxetine (Paxil) 30 mg tablet   Take 2 tablets (60 mg) at night     Prazosin (minipress) 2 mg at bedtime     Propanolol 20 mg by mouth twice a day before breakfast and lunch     Quetiapine (Seroquel) 25 mg tablets  Take 2 tablets (50 mg) at bedtime       I did discuss with patient that she has mostly likely not be taking her medication as prescribed or consistently based on the dates on her medication bottles and when meds were filled at pharmacy  Patient has pill box for the week and broken down to morning, afternoon, evening and bedtime  Encouraged patient and son in law to use to help better manage medication on a daily basis and encouraged to set up on a weekly basis  Reviewed with them when to call for refills  Encouraged to call when they have 1 week of medication left and if refills at pharmacy to call pharmacy and if no refills left at pharmacy to call prescribing office to request a new script be sent to pharmacy  Will plan to reevaluate medication adherence at next appointment and how they are managing with using pill box  I did discuss with them about possibly changing pharmacies to one that offers bubble packing to manage medication if needed  Instructed for them to bring all medication bottles again to next PCP appointment and strongly encouraged for patient to allow her son in law to be present at all her appointments  Son in law very appreciative of medication reconciliation that was done today and given update medication list  Patient reports feeling less overwhelmed now that we went through all her medication bottles and narrowed them down  Patient using [facility] pharmacy on [facility] in [facility]  Patient's new address updated in chart under demographics  Patient was ordered mammogram today  Patient has EEG to complete that was ordered by Neurology  Son given central scheduling number [phone] to call and schedule  Encouraged to reach out to me with any issues and gave him my contact number   Patient to get bloodwork completed today that was ordered by Neurology  Patient encouraged to follow up with Ortho office and given contact number  Son in law with help patient call  Encouraged patient to attend outpatient physical therapy  Patient reports she wants in home PT  Per son in law there is now enough room for in home PT and would like patient to go to outpatient PT  They will further discuss as a family and with ortho  Encouraged patient to follow up routinely with PCP, mental health, neurology and other specialists  Instructed that she must bring her medication to next PCP appointment that is scheduled on 11/23/22  Patient receive flu shot today in office  Patient and son in law do not have any further questions, concerns, or other needs at this time  They have my contact # and PCP office # if needed  Pt is agreeable for further outreach  Will send updated medication list so it shows all directions for medication on list as on AVS does not appear for all of them  To be emailed to [email or web address]    I did give them a handout on how to sign up for Mychart to help manage her care and see when appointments are scheduled for  Please see provider note for additional info

## 2022-10-13 NOTE — PROGRESS NOTES
10/13/22    [address] [place] [address] printed and emailed to son and daughter with patient's verbal permission

## 2022-10-16 LAB
ALDOST SERPL-MCNC: 8.9 NG/DL (ref 0–30)
ALDOST/RENIN PLAS-RTO: 43 {RATIO} (ref 0–30)
RENIN PLAS-CCNC: 0.21 NG/ML/HR (ref 0.17–5.38)

## 2022-10-17 NOTE — PROGRESS NOTES
Thank you so much for assisting with the medication reconciliation and providing the organized update  This was extremely helpful

## 2022-10-25 ENCOUNTER — TELEPHONE (OUTPATIENT)
Dept: INTERNAL MEDICINE CLINIC | Facility: CLINIC | Age: 57
End: 2022-10-25

## 2022-10-25 NOTE — TELEPHONE ENCOUNTER
[facility]    Name of [address] [place] [address] Physician's Order     Form to be filled out by- Dr Sonia Boone to be Faxed ([phone])    Patient made aware of 10 business day policy

## 2022-10-27 ENCOUNTER — PATIENT OUTREACH (OUTPATIENT)
Dept: INTERNAL MEDICINE CLINIC | Facility: CLINIC | Age: 57
End: 2022-10-27

## 2022-10-27 NOTE — PROGRESS NOTES
Outpatient Care Management Note:    I called patient's son in law/paid caregiver Baldemar at [phone]  No answer and message left this is the nurse Henrik Parra calling with patient's PCP office to see how it is has been going since her appointment with managing her medication  I requested a call back at [phone] and that I am available M-F 8-4:30 pm      Patient needs follow up with ortho and has not done PT  Patient has EEG to complete needs to be done before follow up with Neurology on 12/12/22  Patient to follow up with PCP office on 11/23/22

## 2022-11-01 NOTE — TELEPHONE ENCOUNTER
Form needs correction  The signature date has to be after the start date  PLEASE INITIAL next to changes     Re-fax back to [address] [place] [address] Drive

## 2022-11-24 ENCOUNTER — TELEPHONE (OUTPATIENT)
Dept: OTHER | Facility: OTHER | Age: 57
End: 2022-11-24

## 2022-12-02 ENCOUNTER — TELEPHONE (OUTPATIENT)
Dept: NEUROLOGY | Facility: CLINIC | Age: 57
End: 2022-12-02

## 2022-12-02 NOTE — TELEPHONE ENCOUNTER
Called and left a voicemail for patient - Please call back to confirm upcoming appointment with [facility]  Provided patient with apt date, time and location  Informed patient that check in is at least 15 minutes prior to apt time

## 2022-12-12 ENCOUNTER — TELEPHONE (OUTPATIENT)
Dept: NEUROLOGY | Facility: CLINIC | Age: 57
End: 2022-12-12

## 2022-12-19 NOTE — TELEPHONE ENCOUNTER
PATIENT INSTRUCTIONS    Treatment:  Surgery  Surgery  Type of surgery: left reverse total shoulder arthroplasty    You will be scheduled for surgery with Dr. Damien Florentino by one of our schedulers. Please look over all instructions given by the surgery scheduler, complete necessary pre-operative guidelines (PCP, labs, x-rays), and make note of post-operative appointment.     Follow-Up:  Please follow-up after surgery as directed by .            Please note: 24 hour notice for cancellation of appointment is required.    You may receive a survey in the mail, or via the e-mail address that you have provided.  We would appreciate if you could fill out the survey and provide us with any feedback on your experience regarding your visit today. Thank you for allowing us to provide you with your health care needs.     Do not hesitate to call if you are experiencing severe pain, worsening or change in your pain, have symptoms of infection (fever, warmth, redness, increased drainage), or have any other problem that concerns you ~ [phone] (or [phone] after hours).    Please remember when requesting refills on pain medication that the request should be made by Thursday at the latest. [facility] is open Monday-Friday, 8am-5pm, and closed on the weekends.  No narcotic refills will be filled after hours.    Additional Educational Resources:  For additional resources regarding your symptoms, diagnosis, or further health information, please visit the Health Resources section on [email or web address] or the Online Health Resources section in [employer].       Patient has been on opoid pain management for over a year  She will need to be tapered over time  Patient currently on Morphine ER 15mg BID and Hydromorphone 2mg q6h= 8mg daily  Will will continue with Morphine ER for now and start to taper the hydromorphone as follows      Patient to get 1mg tablets a total of 154 tablets for 28 days  Week 1: 7mg per day pt will need to lower one of her doses in the day to just 1 mg  (49 tablets for 7 days)     Week 2: 6mg  Per day, pt will need to alternate 2 dose of 1mg and 2 dose of 2mg (42 tablets for 7 days)    Week 3: 5mg per day, pt will need to have only one dose be 2mg and the other 3 to be 1mg dose (35 tablets for 7 days)    Week 4: 4mg per day 1mg every 6h  (28 tablets for 7 days)    Then we will discuss lowering the morphine ER to 10mg BID and lower the Hydromorphone IR to q8h then q12h and then daily and finally off  Patient to be tapered off opioids which will likely take a few months  Per pain management note in system they feel opioids are not appropriate for pain regimen  Patient to f/u with pain management on 12/3/19

## 2022-12-21 ENCOUNTER — TELEPHONE (OUTPATIENT)
Dept: INTERNAL MEDICINE CLINIC | Facility: CLINIC | Age: 57
End: 2022-12-21

## 2022-12-28 ENCOUNTER — PATIENT OUTREACH (OUTPATIENT)
Dept: INTERNAL MEDICINE CLINIC | Facility: CLINIC | Age: 57
End: 2022-12-28

## 2022-12-28 NOTE — PROGRESS NOTES
Outpatient Care Management Note:    Patient called office requesting to speak with me  Clerical staff unable to understand patient on the phone and call was transferred to me  Patient asking how to get setup with the waiver program  I made patient aware she already has the waiver program and her son in law is her paid caregiver  I tried asking her what further questions or concerns she may have and unable to tell me  I did give her the contact info for her  Alexsandra Acosta at [phone] if she needs to further discuss about [place] waiver program      I did try addressing with patient her missed appointments including Neurology on 12/12/22, mammogram on 12/13/22 and PCP on 12/21/22  Stressed importance of following up routinely  I requested to talk with her son in law and/or daughter further about her appointments and follow up needed  Patient states she can talk for herself and declined for me to talk with them at this time  Patient states that she will call me back at a later time to further discuss and that she did not want to talk about it at this time  I did make her aware I will reach out to her further next week  She has my contact number if needed in the meantime  Patient's voice sound hoarse on the phone  I asked if she was feeling well and needed an appointment to be seen  She reports she has a cold and is getting better  She reports has a cough and her voice comes and goes  She denies any other sick contacts  Patient declining need for appointment at this time

## 2023-01-04 ENCOUNTER — PATIENT OUTREACH (OUTPATIENT)
Dept: INTERNAL MEDICINE CLINIC | Facility: CLINIC | Age: 58
End: 2023-01-04

## 2023-01-04 NOTE — PROGRESS NOTES
Outpatient Care Management Note:    I called patient to follow up with her and phone is not accepting calls at this time and unable to leave message

## 2023-01-13 ENCOUNTER — PATIENT OUTREACH (OUTPATIENT)
Dept: INTERNAL MEDICINE CLINIC | Facility: CLINIC | Age: 58
End: 2023-01-13

## 2023-01-13 NOTE — PROGRESS NOTES
Outpatient Care Management Note:    I called patient at [phone]  It stated it was a google voice number  No answer and message left this is the nurse Reginald Fischer calling with her PCP office to follow up with her to see how she is feeling, managing at home and with taking her medication and to review upcoming appointments and follow up that is needed  I requested a call back at left my contact number [phone] and PCP office number [phone]  Will mail unable to reach letter to patient at this time       Patient scheduled 2/17 with PCP   No show to Neurology appointment on 12/12/22  No show to mammogram on 12/13/22   Has not followed up with ortho or participated in outpatient PT

## 2023-01-13 NOTE — LETTER
Date: 01/13/23    Dear Ellen Gonzalez,   My name is Severiano Saul; I am a registered nurse care manager working with [facility], your primary care doctor's office  I have not been able to reach you and would like to set a time that I can talk with you over the phone or in-person  My work is to help patients that have complex medical conditions get the care they need  This includes patients who may have been in the hospital or emergency room  Please call me with any questions you may have  I look forward to hearing from you    Sincerely,  Severiano Overland   [phone]  Outpatient Care Manager

## 2023-01-26 ENCOUNTER — PATIENT OUTREACH (OUTPATIENT)
Dept: INTERNAL MEDICINE CLINIC | Facility: CLINIC | Age: 58
End: 2023-01-26

## 2023-01-26 NOTE — PROGRESS NOTES
Outpatient Care Management Note:    Received message from patient's daughter Shayne Meade yesterday evening that they received my letter in the mail that I was unable to reach patient and requesting I call her back at [phone]  I returned call to patient's daughter Shayne Meade at [phone]  Patient gave verbal permission to speak with daughter  She reports family was sick most of December including patient  She reports this number is the best to reach her and her /patient's paid caregiver Baldemar Mcdermott paid caregiver hours are M-F 5 am-10 am  Patient typically has someone with her 24/7 as she lives with her daughter, son in law and grandchildren  Per [place] patient's son Paulo Denver recently moved and had a baby and best contact would be her [place] at this time  I reviewed with her patient missed PCP, Neurology and mammogram appointments in December  I did review with her patient is scheduled for Friday 2/17 @ 10 am with PCP office and to bring all of her medication bottles including her mental health medication that is prescribed to her  Per daughter patient recently had a change to her mental health medication and they have noticed an improvement with her tardive dyskinesia  I did provide her with the phone number for Neurology [phone] to call and schedule follow up appointment  I did provide her with the phone number for central scheduling to schedule mammogram [phone]  I did provide her with the orthopedic office number if needed [phone]  Per daughter patient has been ambulating well and going up and down the steps  Per daughter patient has not had an recent falls, she uses a walker when going out but has not been using any assistive device while in the home  Per daughter patient has lost some weight as they are cooking for her and follow more of a plant based diet  They have recently helped patient start better managing her medication   Per daughter patient likes to keep her empty pill bottles and they have found medication in several places  They have talked with patient about using pill boxes and have a weekly check in to look and go over her medication  Daughter does report patient has been having difficulty with her hearing and has had issues in the past with ear wax  I did make her aware we can look at her ears at her upcoming appointment and can do ear wax removal in the office if needed and referral to audiology/ENT if needed but needs to come to appointment for the doctor to further evaluate her  I did encourage that someone come with her to her appointments  Patient and daughter do not have any further questions, concerns, or needs at this time  They have my contact number and PCP office number if needed

## 2023-01-28 DIAGNOSIS — Z00.8 MEDICAL CLEARANCE FOR PSYCHIATRIC ADMISSION: ICD-10-CM

## 2023-02-01 RX ORDER — LITHIUM CARBONATE 300 MG/1
TABLET, FILM COATED, EXTENDED RELEASE ORAL
Qty: 90 TABLET | Refills: 1 | Status: SHIPPED | OUTPATIENT
Start: 2023-02-01

## 2023-02-17 ENCOUNTER — OFFICE VISIT (OUTPATIENT)
Dept: INTERNAL MEDICINE CLINIC | Facility: CLINIC | Age: 58
End: 2023-02-17

## 2023-02-17 ENCOUNTER — APPOINTMENT (OUTPATIENT)
Dept: LAB | Facility: CLINIC | Age: 58
End: 2023-02-17

## 2023-02-17 ENCOUNTER — PATIENT OUTREACH (OUTPATIENT)
Dept: INTERNAL MEDICINE CLINIC | Facility: CLINIC | Age: 58
End: 2023-02-17

## 2023-02-17 VITALS
BODY MASS INDEX: 31.17 KG/M2 | SYSTOLIC BLOOD PRESSURE: 130 MMHG | WEIGHT: 154.6 LBS | OXYGEN SATURATION: 99 % | HEART RATE: 86 BPM | HEIGHT: 59 IN | TEMPERATURE: 98.6 F | DIASTOLIC BLOOD PRESSURE: 87 MMHG

## 2023-02-17 DIAGNOSIS — Z00.00 MEDICARE ANNUAL WELLNESS VISIT, INITIAL: ICD-10-CM

## 2023-02-17 DIAGNOSIS — I10 ESSENTIAL HYPERTENSION: ICD-10-CM

## 2023-02-17 DIAGNOSIS — H61.22 IMPACTED CERUMEN OF LEFT EAR: ICD-10-CM

## 2023-02-17 DIAGNOSIS — E87.6 HYPOKALEMIA: ICD-10-CM

## 2023-02-17 DIAGNOSIS — Z96.651 STATUS POST TOTAL KNEE REPLACEMENT, RIGHT: ICD-10-CM

## 2023-02-17 DIAGNOSIS — Z00.00 MEDICARE ANNUAL WELLNESS VISIT, INITIAL: Primary | ICD-10-CM

## 2023-02-17 LAB
ALBUMIN SERPL BCP-MCNC: 3.9 G/DL (ref 3.5–5)
ALP SERPL-CCNC: 104 U/L (ref 46–116)
ALT SERPL W P-5'-P-CCNC: 32 U/L (ref 12–78)
ANION GAP SERPL CALCULATED.3IONS-SCNC: 6 MMOL/L (ref 4–13)
AST SERPL W P-5'-P-CCNC: 21 U/L (ref 5–45)
BASOPHILS # BLD AUTO: 0.07 THOUSANDS/ÂΜL (ref 0–0.1)
BASOPHILS NFR BLD AUTO: 1 % (ref 0–1)
BILIRUB SERPL-MCNC: 0.83 MG/DL (ref 0.2–1)
BUN SERPL-MCNC: 4 MG/DL (ref 5–25)
CALCIUM SERPL-MCNC: 9.1 MG/DL (ref 8.3–10.1)
CHLORIDE SERPL-SCNC: 109 MMOL/L (ref 96–108)
CO2 SERPL-SCNC: 26 MMOL/L (ref 21–32)
CREAT SERPL-MCNC: 0.67 MG/DL (ref 0.6–1.3)
DME PARACHUTE DELIVERY DATE REQUESTED: NORMAL
DME PARACHUTE ITEM DESCRIPTION: NORMAL
DME PARACHUTE ITEM DESCRIPTION: NORMAL
DME PARACHUTE ORDER STATUS: NORMAL
DME PARACHUTE SUPPLIER NAME: NORMAL
DME PARACHUTE SUPPLIER PHONE: NORMAL
EOSINOPHIL # BLD AUTO: 0.25 THOUSAND/ÂΜL (ref 0–0.61)
EOSINOPHIL NFR BLD AUTO: 5 % (ref 0–6)
ERYTHROCYTE [DISTWIDTH] IN BLOOD BY AUTOMATED COUNT: 14.8 % (ref 11.6–15.1)
GFR SERPL CREATININE-BSD FRML MDRD: 96 ML/MIN/1.73SQ M
GLUCOSE SERPL-MCNC: 86 MG/DL (ref 65–140)
HCT VFR BLD AUTO: 41.1 % (ref 34.8–46.1)
HGB BLD-MCNC: 13.9 G/DL (ref 11.5–15.4)
IMM GRANULOCYTES # BLD AUTO: 0.02 THOUSAND/UL (ref 0–0.2)
IMM GRANULOCYTES NFR BLD AUTO: 0 % (ref 0–2)
LYMPHOCYTES # BLD AUTO: 1.25 THOUSANDS/ÂΜL (ref 0.6–4.47)
LYMPHOCYTES NFR BLD AUTO: 24 % (ref 14–44)
MCH RBC QN AUTO: 29.8 PG (ref 26.8–34.3)
MCHC RBC AUTO-ENTMCNC: 33.8 G/DL (ref 31.4–37.4)
MCV RBC AUTO: 88 FL (ref 82–98)
MONOCYTES # BLD AUTO: 0.31 THOUSAND/ÂΜL (ref 0.17–1.22)
MONOCYTES NFR BLD AUTO: 6 % (ref 4–12)
NEUTROPHILS # BLD AUTO: 3.3 THOUSANDS/ÂΜL (ref 1.85–7.62)
NEUTS SEG NFR BLD AUTO: 64 % (ref 43–75)
NRBC BLD AUTO-RTO: 0 /100 WBCS
PLATELET # BLD AUTO: 375 THOUSANDS/UL (ref 149–390)
PMV BLD AUTO: 9 FL (ref 8.9–12.7)
POTASSIUM SERPL-SCNC: 3.4 MMOL/L (ref 3.5–5.3)
PROT SERPL-MCNC: 7 G/DL (ref 6.4–8.4)
RBC # BLD AUTO: 4.67 MILLION/UL (ref 3.81–5.12)
SODIUM SERPL-SCNC: 141 MMOL/L (ref 135–147)
WBC # BLD AUTO: 5.2 THOUSAND/UL (ref 4.31–10.16)

## 2023-02-17 RX ORDER — MIRTAZAPINE 7.5 MG/1
7.5 TABLET, FILM COATED ORAL EVERY EVENING
Qty: 60 TABLET | Refills: 0 | Status: SHIPPED | OUTPATIENT
Start: 2023-02-17 | End: 2023-04-18

## 2023-02-17 RX ORDER — ACETAMINOPHEN 500 MG
500 TABLET ORAL EVERY 6 HOURS PRN
Qty: 120 TABLET | Refills: 1 | Status: SHIPPED | OUTPATIENT
Start: 2023-02-17

## 2023-02-17 RX ORDER — CALCIUM CARBONATE 200(500)MG
2 TABLET,CHEWABLE ORAL EVERY 8 HOURS PRN
Qty: 60 TABLET | Refills: 0 | Status: SHIPPED | OUTPATIENT
Start: 2023-02-17 | End: 2023-03-19

## 2023-02-17 NOTE — PATIENT INSTRUCTIONS
Medicare Preventive Visit Patient Instructions  Thank you for completing your Welcome to Medicare Visit or Medicare Annual Wellness Visit today  Your next wellness visit will be due in one year (2/18/2024)  The screening/preventive services that you may require over the next 5-10 years are detailed below  Some tests may not apply to you based off risk factors and/or age  Screening tests ordered at today's visit but not completed yet may show as past due  Also, please note that scanned in results may not display below  Preventive Screenings:  Service Recommendations Previous Testing/Comments   Colorectal Cancer Screening  * Colonoscopy    * Fecal Occult Blood Test (FOBT)/Fecal Immunochemical Test (FIT)  * Fecal DNA/Cologuard Test  * Flexible Sigmoidoscopy Age: 39-70 years old   Colonoscopy: every 10 years (may be performed more frequently if at higher risk)  OR  FOBT/FIT: every 1 year  OR  Cologuard: every 3 years  OR  Sigmoidoscopy: every 5 years  Screening may be recommended earlier than age 39 if at higher risk for colorectal cancer  Also, an individualized decision between you and your healthcare provider will decide whether screening between the ages of 74-80 would be appropriate  Colonoscopy: Not on file  FOBT/FIT: Not on file  Cologuard: Not on file  Sigmoidoscopy: Not on file          Breast Cancer Screening Age: 36 years old  Frequency: every 1-2 years  Not required if history of left and right mastectomy Mammogram: 10/10/2013        Cervical Cancer Screening Between the ages of 21-29, pap smear recommended once every 3 years  Between the ages of 33-67, can perform pap smear with HPV co-testing every 5 years     Recommendations may differ for women with a history of total hysterectomy, cervical cancer, or abnormal pap smears in past  Pap Smear: Not on file        Hepatitis C Screening Once for adults born between [facility]  More frequently in patients at high risk for Hepatitis C Hep C Antibody: 01/29/2015    Screening Current   Diabetes Screening 1-2 times per year if you're at risk for diabetes or have pre-diabetes Fasting glucose: 102 mg/dL (10/12/2022)  A1C: 4 8 % (6/28/2022)  Screening Current   Cholesterol Screening Once every 5 years if you don't have a lipid disorder  May order more often based on risk factors  Lipid panel: 03/01/2022    Screening Not Indicated  History Lipid Disorder     Other Preventive Screenings Covered by Medicare:  1  Abdominal Aortic Aneurysm (AAA) Screening: covered once if your at risk  You're considered to be at risk if you have a family history of AAA  2  Lung Cancer Screening: covers low dose CT scan once per year if you meet all of the following conditions: (1) Age 50-69; (2) No signs or symptoms of lung cancer; (3) Current smoker or have quit smoking within the last 15 years; (4) You have a tobacco smoking history of at least 20 pack years (packs per day multiplied by number of years you smoked); (5) You get a written order from a healthcare provider  3  Glaucoma Screening: covered annually if you're considered high risk: (1) You have diabetes OR (2) Family history of glaucoma OR (3)  aged 48 and older OR (3)  American aged 72 and older  3  Osteoporosis Screening: covered every 2 years if you meet one of the following conditions: (1) You're estrogen deficient and at risk for osteoporosis based off medical history and other findings; (2) Have a vertebral abnormality; (3) On glucocorticoid therapy for more than 3 months; (4) Have primary hyperparathyroidism; (5) On osteoporosis medications and need to assess response to drug therapy  · Last bone density test (DXA Scan): 01/28/2020   5  HIV Screening: covered annually if you're between the age of 15-65  Also covered annually if you are younger than 13 and older than 72 with risk factors for HIV infection   For pregnant patients, it is covered up to 3 times per pregnancy  Immunizations:  Immunization Recommendations   Influenza Vaccine Annual influenza vaccination during flu season is recommended for all persons aged >= 6 months who do not have contraindications   Pneumococcal Vaccine   * Pneumococcal conjugate vaccine = PCV13 (Prevnar 13), PCV15 (Vaxneuvance), PCV20 (Prevnar 20)  * Pneumococcal polysaccharide vaccine = PPSV23 (Pneumovax) Adults 25-60 years old: 1-3 doses may be recommended based on certain risk factors  Adults 72 years old: 1-2 doses may be recommended based off what pneumonia vaccine you previously received   Hepatitis B Vaccine 3 dose series if at intermediate or high risk (ex: diabetes, end stage renal disease, liver disease)   Tetanus (Td) Vaccine - COST NOT COVERED BY MEDICARE PART B Following completion of primary series, a booster dose should be given every 10 years to maintain immunity against tetanus  Td may also be given as tetanus wound prophylaxis  Tdap Vaccine - COST NOT COVERED BY MEDICARE PART B Recommended at least once for all adults  For pregnant patients, recommended with each pregnancy  Shingles Vaccine (Shingrix) - COST NOT COVERED BY MEDICARE PART B  2 shot series recommended in those aged 48 and above     Health Maintenance Due:      Topic Date Due   • Cervical Cancer Screening  Never done   • Colorectal Cancer Screening  Never done   • Breast Cancer Screening: Mammogram  10/10/2014   • HIV Screening  Completed   • Hepatitis C Screening  Completed     Immunizations Due:      Topic Date Due   • COVID-19 Vaccine (1) Never done     Advance Directives   What are advance directives? Advance directives are legal documents that state your wishes and plans for medical care  These plans are made ahead of time in case you lose your ability to make decisions for yourself  Advance directives can apply to any medical decision, such as the treatments you want, and if you want to donate organs  What are the types of advance directives? There are many types of advance directives, and each state has rules about how to use them  You may choose a combination of any of the following:  · Living will: This is a written record of the treatment you want  You can also choose which treatments you do not want, which to limit, and which to stop at a certain time  This includes surgery, medicine, IV fluid, and tube feedings  · Durable power of  for healthcare [facility]): This is a written record that states who you want to make healthcare choices for you when you are unable to make them for yourself  This person, called a proxy, is usually a family member or a friend  You may choose more than 1 proxy  · Do not resuscitate (DNR) order:  A DNR order is used in case your heart stops beating or you stop breathing  It is a request not to have certain forms of treatment, such as CPR  A DNR order may be included in other types of advance directives  · Medical directive: This covers the care that you want if you are in a coma, near death, or unable to make decisions for yourself  You can list the treatments you want for each condition  Treatment may include pain medicine, surgery, blood transfusions, dialysis, IV or tube feedings, and a ventilator (breathing machine)  · Values history: This document has questions about your views, beliefs, and how you feel and think about life  This information can help others choose the care that you would choose  Why are advance directives important? An advance directive helps you control your care  Although spoken wishes may be used, it is better to have your wishes written down  Spoken wishes can be misunderstood, or not followed  Treatments may be given even if you do not want them  An advance directive may make it easier for your family to make difficult choices about your care     Weight Management   Why it is important to manage your weight:  Being overweight increases your risk of health conditions such as heart disease, high blood pressure, type 2 diabetes, and certain types of cancer  It can also increase your risk for osteoarthritis, sleep apnea, and other respiratory problems  Aim for a slow, steady weight loss  Even a small amount of weight loss can lower your risk of health problems  How to lose weight safely:  A safe and healthy way to lose weight is to eat fewer calories and get regular exercise  You can lose up about 1 pound a week by decreasing the number of calories you eat by 500 calories each day  Healthy meal plan for weight management:  A healthy meal plan includes a variety of foods, contains fewer calories, and helps you stay healthy  A healthy meal plan includes the following:  · Eat whole-grain foods more often  A healthy meal plan should contain fiber  Fiber is the part of grains, fruits, and vegetables that is not broken down by your body  Whole-grain foods are healthy and provide extra fiber in your diet  Some examples of whole-grain foods are whole-wheat breads and pastas, oatmeal, brown rice, and bulgur  · Eat a variety of vegetables every day  Include dark, leafy greens such as spinach, kale, naun greens, and mustard greens  Eat yellow and orange vegetables such as carrots, sweet potatoes, and winter squash  · Eat a variety of fruits every day  Choose fresh or canned fruit (canned in its own juice or light syrup) instead of juice  Fruit juice has very little or no fiber  · Eat low-fat dairy foods  Drink fat-free (skim) milk or 1% milk  Eat fat-free yogurt and low-fat cottage cheese  Try low-fat cheeses such as mozzarella and other reduced-fat cheeses  · Choose meat and other protein foods that are low in fat  Choose beans or other legumes such as split peas or lentils  Choose fish, skinless poultry (chicken or turkey), or lean cuts of red meat (beef or pork)  Before you cook meat or poultry, cut off any visible fat  · Use less fat and oil  Try baking foods instead of frying them  Add less fat, such as margarine, sour cream, regular salad dressing and mayonnaise to foods  Eat fewer high-fat foods  Some examples of high-fat foods include french fries, doughnuts, ice cream, and cakes  · Eat fewer sweets  Limit foods and drinks that are high in sugar  This includes candy, cookies, regular soda, and sweetened drinks  Exercise:  Exercise at least 30 minutes per day on most days of the week  Some examples of exercise include walking, biking, dancing, and swimming  You can also fit in more physical activity by taking the stairs instead of the elevator or parking farther away from stores  Ask your healthcare provider about the best exercise plan for you  Narcotic (Opioid) Safety    Use narcotics safely:  · Take prescribed narcotics exactly as directed  · Do not give narcotics to others or take narcotics that belong to someone else  · Do not mix narcotics without medicines or alcohol  · Do not drive or operate heavy machinery after you take the narcotic  · Monitor for side effects and notify your healthcare provider if you experienced side effects such as nausea, sleepiness, itching, or trouble thinking clearly  Manage constipation:    Constipation is the most common side effect of narcotic medicine  Constipation is when you have hard, dry bowel movements, or you go longer than usual between bowel movements  Tell your healthcare provider about all changes in your bowel movements while you are taking narcotics  He or she may recommend laxative medicine to help you have a bowel movement  He or she may also change the kind of narcotic you are taking, or change when you take it  The following are more ways you can prevent or relieve constipation:    · Drink liquids as directed  You may need to drink extra liquids to help soften and move your bowels  Ask how much liquid to drink each day and which liquids are best for you  · Eat high-fiber foods    This may help decrease constipation by adding bulk to your bowel movements  High-fiber foods include fruits, vegetables, whole-grain breads and cereals, and beans  Your healthcare provider or dietitian can help you create a high-fiber meal plan  Your provider may also recommend a fiber supplement if you cannot get enough fiber from food  · Exercise regularly  Regular physical activity can help stimulate your intestines  Walking is a good exercise to prevent or relieve constipation  Ask which exercises are best for you  · Schedule a time each day to have a bowel movement  This may help train your body to have regular bowel movements  Bend forward while you are on the toilet to help move the bowel movement out  Sit on the toilet for at least 10 minutes, even if you do not have a bowel movement  Store narcotics safely:   · Store narcotics where others cannot easily get them  Keep them in a locked cabinet or secure area  Do not  keep them in a purse or other bag you carry with you  A person may be looking for something else and find the narcotics  · Make sure narcotics are stored out of the reach of children  A child can easily overdose on narcotics  Narcotics may look like candy to a small child  The best way to dispose of narcotics: The laws vary by country and area  In the [place], the best way is to return the narcotics through a take-back program  This program is offered by the [employer]  The following are options for using the program:  · Take the narcotics to a FRITZ collection site  The site is often a law enforcement center  Call your local law enforcement center for scheduled take-back days in your area  You will be given information on where to go if the collection site is in a different location  · Take the narcotics to an approved pharmacy or hospital   A pharmacy or hospital may be set up as a collection site  You will need to ask if it is a FRITZ collection site if you were not directed there   A pharmacy or doctor's office may not be able to take back narcotics unless it is a FRITZ site  · Use a mail-back system  This means you are given containers to put the narcotics into  You will then mail them in the containers  · Use a take-back drop box  This is a place to leave the narcotics at any time  People and animals will not be able to get into the box  Your local law enforcement agency can tell you where to find a drop box in your area  Other ways to manage pain:   · Ask your healthcare provider about non-narcotic medicines to control pain  Nonprescription medicines include NSAIDs (such as ibuprofen) and acetaminophen  Prescription medicines include muscle relaxers, antidepressants, and steroids  · Pain may be managed without any medicines  Some ways to relieve pain include massage, aromatherapy, or meditation  Physical or occupational therapy may also help  For more information:   · Drug Enforcement Administration  [address] [place] [address] [address]  Phone: 7- [phone] - [phone]  Web Address: [facility]/drug_[email or web address]    · [address]  and Drug Administration  [facility] , [address] [place] [address]  Phone: 8- 309 - [phone]  Web Address: http://[employer]/     © Copyright [employer] 2018 Information is for End User's use only and may not be sold, redistributed or otherwise used for commercial purposes   All illustrations and images included in CareNotes® are the copyrighted property of [employer]  or [address] [place] [address] [employer]

## 2023-02-17 NOTE — PROGRESS NOTES
Outpatient Care Management Note:    Patient at PCP office today for Medicare Annual Wellness visit  She presents using a roller walker  Her son in law/paid caregiver is present with her today for the visit  I met with them after their appointment  Patient reports feeling well and reports more active  She reports going up and down the steps without difficulty  She denies any falls since last being seen by PCP office  Patient is allowing the help of her daughter and son in law to help her with managing her medication  They have a weekly pill box they set up and reports feeling more organized with her medication  When I spoke with daughter in January she had mentioned patient has had problems with ear wax in the past and has reported increased difficulty with hearing  I did request if provider can look at her ears and ear wax was removed today  Patient and son in law given phone number for Audiology to call and schedule hearing eval  Patient reports she had hearing aids in the past but lost them  Encouraged them to reach out to insurance to see if she has coverage for hearing aids and also to [place] art   Reviewed she is overdue for mammogram and gave central scheduling number [phone] to call and schedule  Aware she is overdue to follow up with Neurology and they have office number [phone]  They have phone number for Audiology [phone]  I gave phone number for GI office to schedule colonoscopy [phone]  Son in law will assist patient with scheduling appointments  Encouraged to reach out if they have any difficulty with scheduling  Patient reports she is active with her mental health with [facility] and is doing virtual and in person appointments  Encouraged continued follow up  Patient was requesting a rollator and provider aware  Will request order be placed in parachute  Patient reports she would feel safer having a walker with a seat and brakes   Patient requesting a motorized scooter  Patient does not needs a scooter in the home to complete ADL's as she is independent with that  Patient encouraged to attend outpatient PT and further discuss with them  Patient and her son in law do not have any further questions, concerns, or other needs at this time  They have my contact # and PCP office # if needed  Pt is agreeable for further outreach  Please see provider note for additional info

## 2023-02-17 NOTE — PROGRESS NOTES
Assessment and Plan:     Problem List Items Addressed This Visit        Cardiovascular and Mediastinum    Essential hypertension - discontinue Propranolol as patient has not taken in months  Currently controlled on current regimen, Norvasc        Other    Hypokalemia - history of persistent hypokalemia  Will repeat metabolic panel  Other Visit Diagnoses     Medicare annual wellness visit, initial    -  Primary    Relevant Medications    calcium carbonate (TUMS) 500 mg chewable tablet    Diclofenac Sodium (VOLTAREN) 1 %    mirtazapine (REMERON) 7 5 MG tablet    Other Relevant Orders    Mammo screening bilateral w 3d & cad    Ambulatory referral for colonoscopy    CBC and differential    Comprehensive metabolic panel    Impacted cerumen of left ear - performed irrigation today with symptomatic relief  Patient also has referral to audiology for hearing loss, bilaterally  Status post total knee replacement, right     - recommended participation in PT  Continue follow-up with ortho  Will order Rollator per patient request      Relevant Medications    acetaminophen (Acetaminophen Extra Strength) 500 mg tablet           Preventive health issues were discussed with patient, and age appropriate screening tests were ordered as noted in patient's After Visit Summary  Personalized health advice and appropriate referrals for health education or preventive services given if needed, as noted in patient's After Visit Summary  History of Present Illness:     Patient presents for a Medicare Wellness Visit    Samantha is a 59year old female with a past medical history of bilateral knee osteoarthritis s/p bilateral knee replacements, chronic pain syndrome, opioid dependence, tardive dyskinesia, hypertension, hyperlipidemia, and prior CVA  She presents to clinic today for her Medicare Wellness Exam      She was last seen in our clinic 10/12 by Dr Kwame Moore for med rec   She presents today with her son-in-law who has been helping manage her appointments and medications  Today, she reports overall doing well  She has been more functional since the knee surgeries  She reports that she is able to ambulate up stairs now at home which is a significant improvement for her  She has not been participating in physical therapy because she reports that she does not think she can participate 2-3 times weekly  I explained the importance of therapy as she does report that she has had issues with balance and overall strength since the surgeries  She expressed understanding of the importance of physical therapy and her son-in-law will also look into PT offices  She also reports ongoing hearing loss, worse in the left ear compared to the right  She reports that she has issues in the past with cerumen impaction  Reviewed care gaps with patient and she is due for colonoscopy and mammogram which she is agreeable to getting done  Medication reconciliation completed at today's visit  She has not taken propranolol for months and she was previously taking this for tremor which her and her son-in-law reports has improved so we will discontinue propanolol at this time  Patient Care Team:  Moreno Guajardo DO as PCP - General (Internal Medicine)  Tomas Esteban DO (Anesthesiology)  MERCY Butler (Pain Medicine)  Barbra Arnold RN as RN Care Manager (General Practice)     Review of Systems:     Review of Systems   Constitutional: Negative for chills, fatigue, fever and unexpected weight change  HENT: Negative for congestion, rhinorrhea, sinus pain and sore throat  Eyes: Negative for visual disturbance  Respiratory: Negative for cough and shortness of breath  Cardiovascular: Negative for chest pain, palpitations and leg swelling  Gastrointestinal: Negative for abdominal pain, blood in stool, constipation, diarrhea, nausea and vomiting     Endocrine: Negative for cold intolerance, heat intolerance, polydipsia and polyuria  Genitourinary: Negative for difficulty urinating, dysuria, frequency, hematuria and urgency  Musculoskeletal: Positive for arthralgias, back pain and gait problem  Negative for myalgias  Skin: Negative for rash and wound  Neurological: Positive for tremors  Negative for dizziness, syncope, weakness, light-headedness and headaches  Psychiatric/Behavioral: Negative for behavioral problems, confusion and sleep disturbance          Problem List:     Patient Active Problem List   Diagnosis   • Right knee pain   • Chronic pain disorder   • Lumbar radiculopathy   • Lumbar spondylosis   • Fibromyalgia   • Lumbar disc disease with radiculopathy   • Spinal stenosis of lumbar region with neurogenic claudication   • Pain in joint, shoulder region   • Intractable low back pain   • Bilateral hip pain   • Bipolar II disorder ([facility])   • Cognitive disorder   • Esophageal reflux   • Fatigue   • Female pelvic pain   • Generalized anxiety disorder   • Essential hypertension   • History of hypokalemia   • Insomnia   • Major depressive disorder, recurrent episode, moderate degree ([facility])   • Migraine   • Opioid dependence ([facility])   • Osteoarthritis of both hips   • Osteoarthritis of knee   • Overactive bladder   • Left hip pain   • Panic disorder without agoraphobia   • Post traumatic stress disorder   • Primary localized osteoarthritis of both knees   • Recent unexplained weight loss   • Sacroiliitis ([facility])   • Tremor   • Urinary incontinence   • Vitamin D deficiency   • Post laminectomy syndrome   • Encounter for long-term use of opiate analgesic   • Family history of colorectal cancer   • Complaint related to dreams   • MIMI (obstructive sleep apnea)   • Tardive dyskinesia   • Primary osteoarthritis of both knees   • Patellofemoral disorder of both knees   • Rash   • Superficial bacterial infection of skin   • Scar of back   • Impaired skin integrity associated with surgical incision   • Status post insertion of spinal cord stimulator   • Ambulatory dysfunction   • Dysphagia   • Arthritis of right knee   • Multiple closed fractures of metatarsal bone of right foot   • Chronic back pain   • Nausea   • Encephalopathy   • History of CVA (cerebrovascular accident)   • Medical clearance for psychiatric admission   • Mixed hyperlipidemia   • Leukopenia   • Preoperative evaluation to rule out surgical contraindication   • CVA (cerebral vascular accident) [facility]   • S/P total knee arthroplasty, right   • Constipation   • Hypokalemia   • Seizure-like activity ([facility] )   • Status post total knee replacement, left   • Anemia   • Hemiplegia, post-stroke [facility])      Past Medical and Surgical History:     Past Medical History:   Diagnosis Date   • Anxiety    • Arthritis     left knee   • At risk for falls    • Bipolar 2 disorder ([facility] )     FOLLOWS WITH PSYCHIATRIST  CONTINUE LAMOTRIGINE; RESOLVED:    • Depression    • Familial tremor     both hands   • Fibromyalgia     LAST ASSESSED:    • GERD (gastroesophageal reflux disease)    • Hearing aid worn     left ear   • [place] (hard of hearing)     left ear   • Hyperlipidemia    • Hypertension    • Left-sided weakness    • Memory loss of unknown cause     long and short term   • Migraine    • Obesity    • Obesity, Class II, BMI 35-39 9    • Overactive bladder    • Panic attack    • Post traumatic stress disorder    • S/P insertion of spinal cord stimulator     no remote   • Seasonal allergies    • Seizures (HCC)     possible seizure like activity   • Stroke [facility])     questionable stroke    • Tardive dyskinesia     PATIENT STATES   • 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    CONT ASA 81 MG DAILY; RESOLVED:    • Urinary incontinence    • Uses walker    • Wears dentures     partial lower / full upper- doesnt wear   • Wears glasses      Past Surgical History:   Procedure Laterality Date   • BACK SURGERY     •  SECTION     • COLONOSCOPY RESOLVED: [id number]   • EAR SURGERY     • EGD     • HYSTERECTOMY  2004   • MYRINGOTOMY W/ TUBES Left    • NECK SURGERY  04/2019   • NE ARTHRP KNE CONDYLE&PLATU MEDIAL&LAT COMPARTMENTS Right 3/9/2022    Procedure: ARTHROPLASTY KNEE TOTAL;  Surgeon: Chantal Espinal DO;  [facility];  Service: Orthopedics   • NE ARTHRP KNE CONDYLE&PLATU MEDIAL&LAT COMPARTMENTS Left 7/5/2022    Procedure: ARTHROPLASTY KNEE TOTAL;  Surgeon: Chantal Espinal DO;  [facility];  Service: Orthopedics   • NE CYSTOURETHROSCOPY N/A 2/18/2016    Procedure: CYSTOSCOPY, BOTOX INJECTION;  Surgeon: Anthony Estes MD;  [facility];  Service: Gynecology   • NE INSJ/RPLCMT SPI NPGR DIR/INDUXIVE COUPLING Right 2/10/2021    Procedure: REPLACEMENT IMPLANTABLE PULSE GENERATOR DORSAL SPINAL COLUMN STIMULATOR, RIGHT;  Surgeon: Miguelina Boyer MD;  [facility];  Service: Neurosurgery   • NE PRQ IMPLTJ NSTIM ELECTRODE ARRAY EPIDURAL Right 7/28/2020    Procedure: INSERTION THORACIC DORSAL COLUMN SPINAL CORD STIMULATOR PERCUTANEOUS W IMPLANTABLE PULSE GENERATOR, RIGHT;  Surgeon: Miguelina Boyer MD;  [facility];  Service: Neurosurgery   • TONSILLECTOMY     • TUBAL LIGATION  1986   • UPPER GASTROINTESTINAL ENDOSCOPY  09/2020      Family History:     Family History   Problem Relation Age of Onset   • Colon cancer Mother    • Alzheimer's disease Father    • Stroke Father    • Colon cancer Brother    • Bipolar disorder Brother    • Breast cancer Maternal Aunt    • Colon cancer Maternal Aunt    • Heart disease Other    • Diabetes Other    • Hypertension Other    • Seizures Son    • Depression Paternal Grandfather    • No Known Problems Sister    • No Known Problems Brother    • Thyroid disease Neg Hx       Social History:     Social History     Socioeconomic History   • Marital status:      Spouse name: None   • Number of children: 2   • Years of education: graduate school    • Highest education level: None   Occupational History   • Occupation: Disabled   Tobacco Use   • Smoking status: Never   • Smokeless tobacco: Never   Vaping Use   • Vaping Use: Never used   Substance and Sexual Activity   • Alcohol use: Not Currently     Comment: 2 x year; being a social drinker as per all scripts    • Drug use: No   • Sexual activity: Not Currently   Other Topics Concern   • None   Social History Narrative    Bereavement    Daily caffeine consumption, 6-8 servings per day     as per all scripts    Lives in [address] [place] [address] Determinants of Health     Financial Resource Strain: Low Risk    • Difficulty of Paying Living Expenses: Not hard at all   Food Insecurity: No Food Insecurity   • Worried About [address] in the Last Year: Never true   • Ran Out of Food in the Last Year: Never true   Transportation Needs: No Transportation Needs   • Lack of Transportation (Medical): No   • Lack of Transportation (Non-Medical):  No   Physical Activity: Not on file   Stress: Not on file   Social Connections: Not on file   Intimate Partner Violence: Not on file   Housing Stability: Low Risk    • Unable to Pay for Housing in the Last Year: No   • Number of Places Lived in the Last Year: 1   • Unstable Housing in the Last Year: No      Medications and Allergies:     Current Outpatient Medications   Medication Sig Dispense Refill   • acetaminophen (Acetaminophen Extra Strength) 500 mg tablet Take 1 tablet (500 mg total) by mouth every 6 (six) hours as needed for mild pain 120 tablet 1   • amLODIPine (NORVASC) 10 mg tablet TAKE 1 TABLET BY MOUTH EVERY DAY 90 tablet 3   • ascorbic acid (VITAMIN C) 500 MG tablet Take 1 tablet (500 mg total) by mouth daily 30 tablet 1   • aspirin (ECOTRIN LOW STRENGTH) 81 mg EC tablet Take 1 tablet (81 mg total) by mouth daily 90 tablet 3   • atorvastatin (LIPITOR) 40 mg tablet TAKE 1 TABLET BY MOUTH EVERY DAY 90 tablet 3   • benztropine (COGENTIN) 1 mg tablet Take 1 mg by mouth 2 (two) times a day     • busPIRone (BUSPAR) 10 mg tablet Take 20 mg by mouth 3 (three) times a day      • calcium carbonate (TUMS) 500 mg chewable tablet Chew 2 tablets (1,000 mg total) every 8 (eight) hours as needed for heartburn 60 tablet 0   • Diclofenac Sodium (VOLTAREN) 1 % Apply 2 g topically 4 (four) times a day 150 g 1   • docusate sodium (COLACE) 100 mg capsule Take 1 capsule (100 mg total) by mouth 2 (two) times a day 20 capsule 0   • ferrous sulfate 324 (65 Fe) mg Take 1 tablet (324 mg total) by mouth daily 30 tablet 1   • folic acid (FOLVITE) 1 mg tablet TAKE 1 TABLET BY MOUTH EVERY DAY 90 tablet 1   • hydrOXYzine HCL (ATARAX) 50 mg tablet Take 50 mg by mouth 3 (three) times a day as needed for itching     • lithium carbonate (LITHOBID) 300 mg CR tablet TAKE 1 TABLET BY MOUTH DAILY AT BEDTIME 90 tablet 1   • LORazepam (ATIVAN) 0 5 mg tablet Take 1 tablet (0 5 mg total) by mouth 2 (two) times a day as needed for anxiety for up to 10 days 10 tablet 0   • mirtazapine (REMERON) 7 5 MG tablet Take 1 tablet (7 5 mg total) by mouth every evening 60 tablet 0   • Multiple Vitamin (multivitamin) tablet Take 1 tablet by mouth daily 30 tablet 1   • naloxone (NARCAN) 4 mg/0 1 mL nasal spray Administer 1 spray into a nostril  If no response after 2-3 minutes, give another dose in the other nostril using a new spray  1 each 1   • pantoprazole (PROTONIX) 20 mg tablet Take 20 mg by mouth daily     • PARoxetine (PAXIL) 30 mg tablet Take 60 mg by mouth 2 (two) times a day  Takes two (60 mg) tabs by mouth nightly   Indications: Social Anxiety Disorder     • prazosin (MINIPRESS) 2 mg capsule Take 2 mg by mouth daily at bedtime  Indications: Frightening Dreams     • pregabalin (LYRICA) 50 mg capsule 1 capsule twice a day  May increase to 1 capsule 3 times per day if needed  90 capsule 2   • QUEtiapine (SEROquel) 25 mg tablet Take 50 mg by mouth daily at bedtime   Indications: Generalized Anxiety Disorder     • Valbenazine Tosylate (Ingrezza) 80 MG CAPS Take by mouth       No current facility-administered medications for this visit  No Known Allergies   Immunizations:     Immunization History   Administered Date(s) Administered   • Influenza, recombinant, quadrivalent,injectable, preservative free 10/12/2022   • Influenza, seasonal, injectable 10/02/2013, 11/20/2014, 10/26/2015   • Tdap 07/25/2014      Health Maintenance:         Topic Date Due   • Cervical Cancer Screening  Never done   • Colorectal Cancer Screening  Never done   • Breast Cancer Screening: Mammogram  10/10/2014   • HIV Screening  Completed   • Hepatitis C Screening  Completed         Topic Date Due   • COVID-19 Vaccine (1) Never done      Medicare Screening Tests and Risk Assessments:     Samantha is here for her Welcome to Medicare visit  Health Risk Assessment:   Patient rates overall health as good  Patient feels that their physical health rating is much better  Patient is satisfied with their life  Eyesight was rated as same  Hearing was rated as slightly worse  Patient feels that their emotional and mental health rating is slightly better  Patients states they are never, rarely angry  Patient states they are never, rarely unusually tired/fatigued  Pain experienced in the last 7 days has been some  Patient's pain rating has been 5/10  Patient states that she has experienced no weight loss or gain in last 6 months  Fall Risk Screening: In the past year, patient has experienced: no history of falling in past year      Urinary Incontinence Screening:   Patient has not leaked urine accidently in the last six months  Home Safety:  Patient does not have trouble with stairs inside or outside of their home  Patient has working smoke alarms and has working carbon monoxide detector  Home safety hazards include: none  Nutrition:   Current diet is Regular  Medications:   Patient is not currently taking any over-the-counter supplements   Patient is able to manage medications  Activities of Daily Living (ADLs)/Instrumental Activities of Daily Living (IADLs):   Walk and transfer into and out of bed and chair?: Yes  Dress and groom yourself?: Yes    Bathe or shower yourself?: Yes    Feed yourself? Yes  Do your laundry/housekeeping?: No  Manage your money, pay your bills and track your expenses?: Yes  Make your own meals?: No    Do your own shopping?: No    Previous Hospitalizations:   Any hospitalizations or ED visits within the last 12 months?: Yes    How many hospitalizations have you had in the last year?: 3-4    Advance Care Planning:   Living will: No    Durable POA for healthcare: No    Advanced directive: No      PREVENTIVE SCREENINGS      Cardiovascular Screening:    General: Screening Not Indicated and History Lipid Disorder      Diabetes Screening:     General: Screening Current      Lung Cancer Screening:     General: Screening Not Indicated      Hepatitis C Screening:    General: Screening Current    Screening, Brief Intervention, and Referral to Treatment (SBIRT)    Screening  Typical number of drinks in a day: 0  Typical number of drinks in a week: 0  Interpretation: Low risk drinking behavior  AUDIT-C Screenin) How often did you have a drink containing alcohol in the past year? never  2) How many drinks did you have on a typical day when you were drinking in the past year? 0  3) How often did you have 6 or more drinks on one occasion in the past year? never    AUDIT-C Score: 0  Interpretation: Score 0-2 (female): Negative screen for alcohol misuse    Single Item Drug Screening:  How often have you used an illegal drug (including marijuana) or a prescription medication for non-medical reasons in the past year? never    Single Item Drug Screen Score: 0  Interpretation: Negative screen for possible drug use disorder    Review of Current Opioid Use    Opioid Risk Tool (ORT) Interpretation: Complete Opioid Risk Tool (ORT)    No results found  Physical Exam:     /87 (BP Location: Right arm, Patient Position: Sitting, Cuff Size: Standard)   Pulse 86   Temp 98 6 °F (37 °C) (Temporal)   Ht 4' 11" (1 499 m)   Wt 70 1 kg (154 lb 9 6 oz)   SpO2 99%   BMI 31 23 kg/m²     Physical Exam:  General: Obese  No apparent distress, resting comfortably   Head: Normocephalic, atraumatic  Eyes: Anicteric, no conjunctival erythema  ENT: Cerumen impaction in left ear canal, treated with irrigation  TM's intact bilaterally  Neck: Trachea midline, no visible lymphadenopathy or goiter  Respiratory: Non-labored respirations, symmetric thorax expansion  Cardiovascular: Extremities appear well-perfused  Abdomen: Non-distended  MSK: Ambulated with rolling walker     Skin: No visible rashes, wounds, or jaundice  Neuro: A&O x 3, no gross focal deficits, no aphasia     Lorren Dakins, D O   PGY-2 Internal Medicine   [facility]

## 2023-02-20 ENCOUNTER — TELEPHONE (OUTPATIENT)
Dept: NEPHROLOGY | Facility: CLINIC | Age: 58
End: 2023-02-20

## 2023-02-20 DIAGNOSIS — E87.6 HYPOKALEMIA: Primary | ICD-10-CM

## 2023-02-20 DIAGNOSIS — E26.9 ALDOSTERONISM (HCC): ICD-10-CM

## 2023-02-20 NOTE — LETTER
[facility] [address] Boyce  [address]  [facility] [facility] [address]  Phone#  [phone]  Fax#  [phone]      February 22, 2023      Storm Beckford      We received a referral in our office to schedule you for a consultation  If you’re interested in scheduling this appointment, please schedule via [employer] or call our office at [phone]    Thank you,  Julita [facility]

## 2023-03-03 ENCOUNTER — TELEPHONE (OUTPATIENT)
Dept: NEUROLOGY | Facility: CLINIC | Age: 58
End: 2023-03-03

## 2023-03-03 NOTE — TELEPHONE ENCOUNTER
Called and spoke to patient - confirmed upcoming appointment with Hai Wilder on 03/08/23 8:00 am at the [facility] office  Provided patient with apt date, time and location  Informed patient that check in is at least 15 minutes prior to apt time  The patient is not  having any issues or concerns at this time

## 2023-03-06 ENCOUNTER — PATIENT OUTREACH (OUTPATIENT)
Dept: INTERNAL MEDICINE CLINIC | Facility: CLINIC | Age: 58
End: 2023-03-06

## 2023-03-06 NOTE — PROGRESS NOTES
I did receive a call from patient's  with SHAKILA Gandhi  Phone: [phone]  Fax: [phone]    She was requesting name of PCP and office contact info and for med list and problem list to be faxed to her  Info faxed  Patient had a decrease in hours as she is more active and independent  Patient currently approved for 25 hours a week  Per  there was talk in the past of patient attending an adult day care 2-3 times a week, but was not interested at the time  Gail Clancy has my contact info if further needed

## 2023-03-06 NOTE — PROGRESS NOTES
Outpatient Care Management Note:    I called patient and no answer  Message left this is the nurse [facility] calling with her PCP office to follow up with her to see how she is feeling, managing with her medication, appointment's and ADL's  I did remind her of audiology appointment tomorrow 3/7 @ 8:45 am and Neurology appointment on Thursday 3/9 @ 8 am  I requested a call back at [phone]  I called patient's daughter Corey Lorenz and [facility] and no answer  I left message reminding them of patient's appointments this week and also reminded patient to schedule mammogram by calling [phone] and that Nephrology office was trying to reach her to schedule an appointment and requested they call their office to schedule [phone]  I left my contact number

## 2023-03-07 ENCOUNTER — OFFICE VISIT (OUTPATIENT)
Dept: AUDIOLOGY | Age: 58
End: 2023-03-07

## 2023-03-07 DIAGNOSIS — H91.90 HEARING LOSS: Primary | ICD-10-CM

## 2023-03-07 DIAGNOSIS — H90.6 MIXED HEARING LOSS, BILATERAL: ICD-10-CM

## 2023-03-07 NOTE — PROGRESS NOTES
HEARING EVALUATION    Name:  Rock Momin  :  1965  Age:  59 y o  Date of Evaluation: 23     History: Difficulty Understanding  Reason for visit: Rock Momin is being seen today at the request of Dr Enmanuel Baez for an evaluation of hearing  Patient reports difficulty hearing and understaning speech  Patient noted she used to wear hearing aids many years ago, but has since lost them  EVALUATION:    Otoscopic Evaluation:   Right Ear: Clear and healthy ear canal and tympanic membrane   Left Ear: Clear and healthy ear canal and tympanic membrane    Tympanometry:   Right: Type B - middle ear disorder   Left: Type B - middle ear disorder    Audiogram Results:  Pure tone testing revealed a mild sloping to severe mixed hearing loss in the  right ear and a moderate sloping to profound mixed hearing loss in the  left  ear  SRT and PTA are in agreement indicating good test reliability  Word recognition scores were fair bilaterally  *see attached audiogram      RECOMMENDATIONS:  3 month hearing eval, Annual hearing eval, Consult ENT, Return to [facility]  for F/U, Hearing Aid Evaluation and Copy to Patient/Caregiver    PATIENT EDUCATION:   Discussed results and recommendations with patient and Son-in-Law  Questions were addressed and the patient was encouraged to contact our department should concerns arise        Stacie Lezama , CCC-A  Clinical Audiologist

## 2023-03-09 ENCOUNTER — OFFICE VISIT (OUTPATIENT)
Dept: NEUROLOGY | Facility: CLINIC | Age: 58
End: 2023-03-09

## 2023-03-09 VITALS
TEMPERATURE: 97.3 F | RESPIRATION RATE: 18 BRPM | HEIGHT: 61 IN | OXYGEN SATURATION: 99 % | DIASTOLIC BLOOD PRESSURE: 82 MMHG | WEIGHT: 155 LBS | HEART RATE: 80 BPM | BODY MASS INDEX: 29.27 KG/M2 | SYSTOLIC BLOOD PRESSURE: 124 MMHG

## 2023-03-09 DIAGNOSIS — G24.01 TARDIVE DYSKINESIA: Primary | ICD-10-CM

## 2023-03-09 DIAGNOSIS — G89.4 CHRONIC PAIN DISORDER: ICD-10-CM

## 2023-03-09 DIAGNOSIS — R41.3 MEMORY LOSS: ICD-10-CM

## 2023-03-09 DIAGNOSIS — R56.9 SEIZURE-LIKE ACTIVITY (HCC): ICD-10-CM

## 2023-03-09 DIAGNOSIS — Z86.73 HISTORY OF CVA (CEREBROVASCULAR ACCIDENT): ICD-10-CM

## 2023-03-09 RX ORDER — VALBENAZINE 40 MG/1
CAPSULE ORAL
Status: ON HOLD | COMMUNITY
Start: 2023-02-23

## 2023-03-09 RX ORDER — PREGABALIN 50 MG/1
CAPSULE ORAL
Qty: 90 CAPSULE | Refills: 3 | Status: ON HOLD | OUTPATIENT
Start: 2023-03-09

## 2023-03-09 RX ORDER — QUETIAPINE FUMARATE 50 MG/1
TABLET, FILM COATED ORAL
Status: ON HOLD | COMMUNITY
Start: 2023-02-07

## 2023-03-09 NOTE — PATIENT INSTRUCTIONS
Take the ativan and hydroxyzine as infrequently as possible  Continue with plans to get the EEG and I have added CT head and neuropsychology evaluation (after hearing evaluation completed) for further evaluation  Continue with plans to see the ENT  Continue with home games/puzzles/reading and we could consider speech therapy in the future after hearing is addressed  Do PT  Lyrica low dose for pain; let me know if any side effects  Follow up in 4 months time  Memory Loss in Older Adults   AMBULATORY CARE:   Some memory loss  is common with aging  You may have sharp long-term memories from many years ago but have trouble remembering new information  Normal memory loss does not get worse and does not affect daily activities  Memory loss that gets worse over time or affects daily activities can be a sign of a serious medical problem, such as Alzheimer disease  Talk with your healthcare provider if you or someone close to you notices that your memory is worsening  Signs and symptoms of memory loss that may happen with aging:   Not remembering where you put your keys or glasses    Trouble recalling a familiar person's name, but then remembering it    Trouble remembering why you walked into a room    Missing an appointment because you forgot about it    Forgetting someone's birthday or an anniversary    Signs and symptoms of severe memory loss:   The following may be signs of a more serious health problem that needs treatment:  Not knowing how to do something you used to do    Trouble learning new facts, or trouble learning skills that need you to remember steps    Trouble following directions, or getting lost, even in an area you know    Not remembering if you took your medicine or finished a task    Not being able to remember events from your past, such as a trip you took    Thinking events that happened years ago happened recently    Forgetting to bathe, brush your teeth, or do other daily care tasks    Not recognizing a family member or friend you have known a long time    You or someone close to you should contact your healthcare provider if:   You have new, sudden, or worsening memory problems  You have questions or concerns about your condition or care  Follow up with your healthcare provider as directed: You may need to have regular memory tests to check for new or worsening problems  Write down your questions so you remember to ask them during your visits  Manage memory loss:  Some memory loss cannot be treated, but you may be able to stop it from getting worse  Your healthcare provider may need to stop or change certain medicines you are taking, or change the dose  The provider may also recommend vitamins or supplements to help improve your memory  The following are ways to help manage memory loss:  Ask someone to help you if needed  Ask the person to help you create lists of things you need to do or set up medicine reminders  The person might be able to call you to remind you of an upcoming task, event, or anniversary  Find a place for items you use often  You may be able to remember where your keys, wallet, glasses, or other items are if you create a place for each item  It may also help if you say that you are returning an item to its place  This may trigger your memory later when you are looking for the item  Set up reminders  Calendars, timers, or alarm clocks can help you remember to do tasks such as taking your medicine  Some medicine dispensers can be set to sound an alarm when it is time to take the medicine  Containers are also available that are labelled for each day of the week  These containers can help you remember if you need to take the medicine or already took it  You may be able to set up reminders with your bank to help you remember to pay your bills on time  Write down anything you need to remember  Examples include upcoming healthcare appointments and medication refills  Put the reminders where you will see them, such as on your bathroom mirror or refrigerator  You may want to make a list of things you need to do each day  You can cross the item off when the task is finished  Create a quiet learning environment  This may help you remember new information more easily  Try to find a place where you will not be distracted by noise, light, or other people  Go slowly and repeat the information to help you remember  Prevent your memory loss from getting worse:   Play brain games  Games such as crossword puzzles, jigsaw puzzles, or math games can help sharpen your memory  Spend time with other people  This can help strengthen your memory, especially if you talk about past or upcoming events  Take a class to learn something new  This will help you think in new ways and make your memory work harder  Eat a variety of healthy foods  Healthy foods include fruits, vegetables, low-fat dairy products, lean meats, fish, whole-grain breads, and cooked beans  Eat more foods with high amounts of omega-3 fatty acids  Examples include salmon, tuna, walnuts, and flaxseeds  Ask your healthcare provider for a list of foods that contain fatty acids and how much you should eat each day  Exercise regularly  Exercise improves blood flow and can help you think and remember more easily  Most healthy adults should try to get at least 30 minutes of exercise on most days of the week  Ask your healthcare provider how much exercise you need and which exercises are best for you  Create a sleep routine  Try to go to bed and wake up at the same times each day  Sleep is important for memory  Talk to your healthcare provider if you are having trouble sleeping  Do not smoke  Nicotine and other chemicals in cigarettes and cigars can reduce the amount of oxygen going to your brain  This can make memory problems worse   Ask your healthcare provider for information if you currently smoke and need help to quit  E-cigarettes or smokeless tobacco still contain nicotine  Talk to your healthcare provider before you use these products  Limit or do not drink alcohol  Alcohol can lead to both short-term and long-term memory problems  Ask your healthcare provider if alcohol is safe for you, and how much is safe to drink  © Copyright [employer] 2022 Information is for End User's use only and may not be sold, redistributed or otherwise used for commercial purposes  The above information is an  only  It is not intended as medical advice for individual conditions or treatments  Talk to your doctor, nurse or pharmacist before following any medical regimen to see if it is safe and effective for you

## 2023-03-09 NOTE — PROGRESS NOTES
Patient ID: Jazlyn Ortiz is a 59 y o  female  Assessment/Plan:  Patient/family instructions: Take the ativan and hydroxyzine as infrequently as possible  Continue with plans to get the EEG and I have added CT head and neuropsychology evaluation (after hearing evaluation completed) for further evaluation  Continue with plans to see the ENT  Continue with home games/puzzles/reading and we could consider speech therapy in the future after hearing is addressed  Do PT  Lyrica low dose for pain; let me know if any side effects  Follow up in 4 months time  Diagnoses and all orders for this visit:    Tardive dyskinesia    History of CVA (cerebrovascular accident)    Seizure-like activity ([facility] [address] )    Memory loss  -     Ambulatory referral to Neuropsychology; Future  -     CT orbits/temporal bones/skull base wo contrast; Future    Chronic pain disorder  -     pregabalin (LYRICA) 50 mg capsule; 1 capsule twice a day  May increase to 1 capsule 3 times per day if needed  Other orders  -     Ingrezza 40 MG CAPS; TAKE 1 CAPSULE (40MG) BY MOUTH ONCE DAILY  -     QUEtiapine (SEROquel) 50 mg tablet; TAKE 1 TAB (50 MG) BY MOUTH 3 TIMES A DAY  (9 AM, 4 PM, AND AT BEDTIME)         Subjective:    ADI Levin presents today for follow up; she is with her son in law today who helps with the history  She continues to complain of memory loss- previous MOCA was 15/30; she was unable to repeat it today as she had forgotten her glasses  MRI brain was not able to be completed because of SCS/mixed leads  She did not complete EEG (thought this visit was for that)  She did see audiology and was found to have mixed hearing loss in both ears and they suggested a consult to ENT which the family is going to set up; she mentions she lost he hearing aides 2 years ago   She has not had any recent seizure activity or recent falls; her leg strength is improving-sometimes she doesn't even need the walker inside; she states she does plan to go to formal PT  She continues with pain-she denies side effect from previously prescribed low dose lyrica (needs refill)  Her son in law states he worked closely with her other physicians to try and reduce doses/frequency of many medications-remeron, ativan, hydroxizine are now prn only medications; Lauryn Crest is down to 40mg/day  Lab review:  K 3 4, Cl 107, creat 0 67, cbc normal, ast 9, alt 17, b12/folate/tsh normal  Qtc 465    Previous History:   past medical history of bipolar, 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, CVA (states 2009 in [place] with residual left sided weakness),  Obesity (was going to have weight loss surgery but never completed this), seizure like activity not on AED, cognitive impairment/ history of encephalopathy likely related to polypharmacy, MIMI not on CPAP, OA with gait dysfunction/chronic pain, DDD s/p lumbar/cervical fusion and now with SCS- thoracic spinal cord stimulator placement in 7/28/20  migraine   she does have family history of seizures  Recently she was evaluated by neurology at [facility] (was hospitalized for gait dysfunction from 5/9-5/25/2022)  for witnessed seizure activity; this was first unprovoked seizure; EEG showed diffuse slowing; CT head neg; she was not able to get MRI because of her stimulator   EKG showed slight Qt prolongation; she did have reduction in her seroquel recently;     Current Outpatient Medications on File Prior to Visit   Medication Sig Dispense Refill   • acetaminophen (Acetaminophen Extra Strength) 500 mg tablet Take 1 tablet (500 mg total) by mouth every 6 (six) hours as needed for mild pain 120 tablet 1   • amLODIPine (NORVASC) 10 mg tablet TAKE 1 TABLET BY MOUTH EVERY DAY 90 tablet 3   • ascorbic acid (VITAMIN C) 500 MG tablet Take 1 tablet (500 mg total) by mouth daily 30 tablet 1   • aspirin (ECOTRIN LOW STRENGTH) 81 mg EC tablet Take 1 tablet (81 mg total) by mouth daily 90 tablet 3   • atorvastatin (LIPITOR) 40 mg tablet TAKE 1 TABLET BY MOUTH EVERY DAY 90 tablet 3   • benztropine (COGENTIN) 1 mg tablet Take 1 mg by mouth 2 (two) times a day     • busPIRone (BUSPAR) 10 mg tablet Take 20 mg by mouth 3 (three) times a day      • calcium carbonate (TUMS) 500 mg chewable tablet Chew 2 tablets (1,000 mg total) every 8 (eight) hours as needed for heartburn 60 tablet 0   • Diclofenac Sodium (VOLTAREN) 1 % Apply 2 g topically 4 (four) times a day 150 g 1   • docusate sodium (COLACE) 100 mg capsule Take 1 capsule (100 mg total) by mouth 2 (two) times a day 20 capsule 0   • ferrous sulfate 324 (65 Fe) mg Take 1 tablet (324 mg total) by mouth daily 30 tablet 1   • folic acid (FOLVITE) 1 mg tablet TAKE 1 TABLET BY MOUTH EVERY DAY 90 tablet 1   • hydrOXYzine HCL (ATARAX) 50 mg tablet Take 50 mg by mouth 3 (three) times a day as needed for itching     • Ingrezza 40 MG CAPS TAKE 1 CAPSULE (40MG) BY MOUTH ONCE DAILY  • lithium carbonate (LITHOBID) 300 mg CR tablet TAKE 1 TABLET BY MOUTH DAILY AT BEDTIME 90 tablet 1   • mirtazapine (REMERON) 7 5 MG tablet Take 1 tablet (7 5 mg total) by mouth every evening 60 tablet 0   • Multiple Vitamin (multivitamin) tablet Take 1 tablet by mouth daily 30 tablet 1   • naloxone (NARCAN) 4 mg/0 1 mL nasal spray Administer 1 spray into a nostril  If no response after 2-3 minutes, give another dose in the other nostril using a new spray  1 each 1   • pantoprazole (PROTONIX) 20 mg tablet Take 20 mg by mouth daily     • PARoxetine (PAXIL) 30 mg tablet Take 60 mg by mouth 2 (two) times a day  Takes two (60 mg) tabs by mouth nightly   Indications: Social Anxiety Disorder     • prazosin (MINIPRESS) 2 mg capsule Take 2 mg by mouth daily at bedtime   Indications: Frightening Dreams     • QUEtiapine (SEROquel) 50 mg tablet TAKE 1 TAB (50 MG) BY MOUTH 3 TIMES A DAY  (9 AM, 4 PM, AND AT BEDTIME)     • LORazepam (ATIVAN) 0 5 mg tablet Take 1 tablet (0 5 mg total) by mouth 2 (two) times a day as needed for anxiety for up to 10 days 10 tablet 0     No current facility-administered medications on file prior to visit  The following portions of the patient's history were reviewed and updated as appropriate: allergies, current medications, past family history, past medical history, past social history, past surgical history and problem list          Objective:    Blood pressure 124/82, pulse 80, temperature (!) 97 3 °F (36 3 °C), temperature source Tympanic, resp  rate 18, height 5' 1" (1 549 m), weight 70 3 kg (155 lb), SpO2 99 %, not currently breastfeeding  Physical Exam  Vitals reviewed  Constitutional:       General: She is not in acute distress  HENT:      Head: Normocephalic and atraumatic  Right Ear: External ear normal  Decreased hearing noted  Left Ear: External ear normal  Decreased hearing noted  Nose: Nose normal       Mouth/Throat:      Mouth: Mucous membranes are dry  Pharynx: Oropharynx is clear  No posterior oropharyngeal erythema  Eyes:      Extraocular Movements: Extraocular movements intact  Pupils: Pupils are equal, round, and reactive to light  Cardiovascular:      Rate and Rhythm: Normal rate and regular rhythm  Pulmonary:      Effort: Pulmonary effort is normal       Breath sounds: Normal breath sounds  Musculoskeletal:      Cervical back: Normal range of motion  Right lower leg: No edema  Left lower leg: No edema  Skin:     Capillary Refill: Capillary refill takes less than 2 seconds  Neurological:      Mental Status: She is alert  Mental status is at baseline  Motor: Motor strength is normal       Deep Tendon Reflexes:      Reflex Scores:       Brachioradialis reflexes are 2+ on the right side and 2+ on the left side  Patellar reflexes are 2+ on the right side and 3+ on the left side  Psychiatric:         Mood and Affect: Mood normal          Speech: Speech normal          Neurological Exam  Mental Status  Alert   Speech is normal  Speech: Difficult to understand at times because of facial movements and dry mouth  Language is fluent with no aphasia  Cranial Nerves  CN II: Right visual acuity: counts fingers  Left visual acuity: counts fingers  CN III, IV, VI: Extraocular movements intact bilaterally  Pupils equal round and reactive to light bilaterally  CN V: Facial sensation is normal   CN VII: Full and symmetric facial movement  CN VIII: Hearing is normal   CN IX, X: Palate elevates symmetrically  CN XI: Shoulder shrug strength is normal   CN XII: Tongue midline without atrophy or fasciculations  Motor  Normal muscle bulk throughout  Strength is 5/5 throughout all four extremities  Sensory  Light touch is normal in upper and lower extremities  Reflexes                                            Right                      Left  Brachioradialis                    2+                         2+  Patellar                                2+                         3+    Coordination  Right: Finger-to-nose normal Left: Finger-to-nose normal   Mild tremor with testing, no dysmetria  Gait  Casual gait: Unable to rise from chair without using arms  Cautious gait, uses walker; no freezing noted today  ROS:    Review of Systems   Constitutional: Negative  Negative for appetite change and fever  HENT: Positive for hearing loss (both ears) and trouble swallowing  Negative for tinnitus (both ears) and voice change  Eyes: Negative  Negative for photophobia, pain and visual disturbance  Respiratory: Negative  Negative for shortness of breath  Cardiovascular: Positive for palpitations  Gastrointestinal: Positive for nausea  Negative for vomiting  Endocrine: Negative  Negative for cold intolerance  Genitourinary: Positive for frequency and urgency  Negative for dysuria  Musculoskeletal: Positive for back pain and gait problem (gait dysturbance - balance issues)  Negative for myalgias and neck pain          Bilateral knee pain   Skin: Negative  Negative for rash  Allergic/Immunologic: Negative  Neurological: Positive for speech difficulty and light-headedness  Negative for dizziness, tremors, seizures, syncope, facial asymmetry, weakness, numbness and headaches  Hematological: Bruises/bleeds easily  Psychiatric/Behavioral: Positive for confusion and sleep disturbance  Negative for hallucinations  The patient is nervous/anxious           Panic attacks , anxiety, memory loss   ROS was reviewed and updated as appropriate

## 2023-03-13 DIAGNOSIS — Z00.00 MEDICARE ANNUAL WELLNESS VISIT, INITIAL: ICD-10-CM

## 2023-03-13 RX ORDER — MIRTAZAPINE 7.5 MG/1
TABLET, FILM COATED ORAL
Qty: 90 TABLET | Refills: 1 | Status: ON HOLD | OUTPATIENT
Start: 2023-03-13

## 2023-03-21 ENCOUNTER — HOSPITAL ENCOUNTER (OUTPATIENT)
Dept: NEUROLOGY | Facility: CLINIC | Age: 58
Discharge: HOME | End: 2023-03-21

## 2023-03-21 DIAGNOSIS — R56.9 SEIZURE-LIKE ACTIVITY (HCC): ICD-10-CM

## 2023-03-22 ENCOUNTER — HOSPITAL ENCOUNTER (OUTPATIENT)
Dept: NEUROLOGY | Facility: CLINIC | Age: 58
Discharge: HOME | End: 2023-03-22

## 2023-03-22 DIAGNOSIS — R56.9 SEIZURE-LIKE ACTIVITY (HCC): ICD-10-CM

## 2023-03-23 ENCOUNTER — HOSPITAL ENCOUNTER (OUTPATIENT)
Dept: NEUROLOGY | Facility: CLINIC | Age: 58
End: 2023-03-23

## 2023-03-23 ENCOUNTER — APPOINTMENT (EMERGENCY)
Dept: CT IMAGING | Facility: HOSPITAL | Age: 58
End: 2023-03-23

## 2023-03-23 ENCOUNTER — HOSPITAL ENCOUNTER (INPATIENT)
Facility: HOSPITAL | Age: 58
LOS: 13 days | Discharge: HOME HEALTH | End: 2023-04-06
Attending: EMERGENCY MEDICINE | Admitting: INTERNAL MEDICINE

## 2023-03-23 DIAGNOSIS — K44.9 HIATAL HERNIA: ICD-10-CM

## 2023-03-23 DIAGNOSIS — E87.20 LACTIC ACIDOSIS: ICD-10-CM

## 2023-03-23 DIAGNOSIS — R11.2 NAUSEA & VOMITING: ICD-10-CM

## 2023-03-23 DIAGNOSIS — R56.9 SEIZURE-LIKE ACTIVITY (HCC): ICD-10-CM

## 2023-03-23 DIAGNOSIS — R56.9 WITNESSED SEIZURE-LIKE ACTIVITY (HCC): ICD-10-CM

## 2023-03-23 DIAGNOSIS — E87.6 HYPOKALEMIA: ICD-10-CM

## 2023-03-23 DIAGNOSIS — R07.9 CHEST PAIN: ICD-10-CM

## 2023-03-23 DIAGNOSIS — R51.9 HEADACHE: ICD-10-CM

## 2023-03-23 DIAGNOSIS — K56.609 SMALL BOWEL OBSTRUCTION (HCC): ICD-10-CM

## 2023-03-23 DIAGNOSIS — G24.01 TARDIVE DYSKINESIA: ICD-10-CM

## 2023-03-23 DIAGNOSIS — R10.9 ABDOMINAL PAIN: Primary | ICD-10-CM

## 2023-03-23 LAB
ALBUMIN SERPL BCP-MCNC: 5 G/DL (ref 3.5–5)
ALP SERPL-CCNC: 100 U/L (ref 34–104)
ALT SERPL W P-5'-P-CCNC: 13 U/L (ref 7–52)
ANION GAP SERPL CALCULATED.3IONS-SCNC: 20 MMOL/L (ref 4–13)
APTT PPP: 30 SECONDS (ref 23–37)
AST SERPL W P-5'-P-CCNC: 16 U/L (ref 13–39)
BASOPHILS # BLD MANUAL: 0 THOUSAND/UL (ref 0–0.1)
BASOPHILS NFR MAR MANUAL: 0 % (ref 0–1)
BILIRUB SERPL-MCNC: 2.05 MG/DL (ref 0.2–1)
BNP SERPL-MCNC: 16 PG/ML (ref 0–100)
BUN SERPL-MCNC: 13 MG/DL (ref 5–25)
CALCIUM SERPL-MCNC: 10.3 MG/DL (ref 8.4–10.2)
CARDIAC TROPONIN I PNL SERPL HS: 4 NG/L
CHLORIDE SERPL-SCNC: 99 MMOL/L (ref 96–108)
CO2 SERPL-SCNC: 21 MMOL/L (ref 21–32)
CREAT SERPL-MCNC: 1.18 MG/DL (ref 0.6–1.3)
EOSINOPHIL # BLD MANUAL: 0 THOUSAND/UL (ref 0–0.4)
EOSINOPHIL NFR BLD MANUAL: 0 % (ref 0–6)
ERYTHROCYTE [DISTWIDTH] IN BLOOD BY AUTOMATED COUNT: 14.8 % (ref 11.6–15.1)
GFR SERPL CREATININE-BSD FRML MDRD: 50 ML/MIN/1.73SQ M
GLUCOSE SERPL-MCNC: 233 MG/DL (ref 65–140)
HCT VFR BLD AUTO: 49.3 % (ref 34.8–46.1)
HGB BLD-MCNC: 16.6 G/DL (ref 11.5–15.4)
INR PPP: 0.93 (ref 0.84–1.19)
LACTATE SERPL-SCNC: 5.2 MMOL/L (ref 0.5–2)
LIPASE SERPL-CCNC: 7 U/L (ref 11–82)
LITHIUM SERPL-SCNC: <0.1 MMOL/L (ref 0.6–1.2)
LYMPHOCYTES # BLD AUTO: 1.15 THOUSAND/UL (ref 0.6–4.47)
LYMPHOCYTES # BLD AUTO: 11 % (ref 14–44)
MAGNESIUM SERPL-MCNC: 1.9 MG/DL (ref 1.9–2.7)
MCH RBC QN AUTO: 29.4 PG (ref 26.8–34.3)
MCHC RBC AUTO-ENTMCNC: 33.7 G/DL (ref 31.4–37.4)
MCV RBC AUTO: 87 FL (ref 82–98)
MONOCYTES # BLD AUTO: 0.31 THOUSAND/UL (ref 0–1.22)
MONOCYTES NFR BLD: 3 % (ref 4–12)
NEUTROPHILS # BLD MANUAL: 8.97 THOUSAND/UL (ref 1.85–7.62)
NEUTS BAND NFR BLD MANUAL: 1 % (ref 0–8)
NEUTS SEG NFR BLD AUTO: 85 % (ref 43–75)
PLATELET # BLD AUTO: 486 THOUSANDS/UL (ref 149–390)
PLATELET BLD QL SMEAR: ABNORMAL
PMV BLD AUTO: 8.9 FL (ref 8.9–12.7)
POTASSIUM SERPL-SCNC: 3.3 MMOL/L (ref 3.5–5.3)
PROT SERPL-MCNC: 8.1 G/DL (ref 6.4–8.4)
PROTHROMBIN TIME: 12.5 SECONDS (ref 11.6–14.5)
RBC # BLD AUTO: 5.65 MILLION/UL (ref 3.81–5.12)
RBC MORPH BLD: NORMAL
SODIUM SERPL-SCNC: 140 MMOL/L (ref 135–147)
WBC # BLD AUTO: 10.43 THOUSAND/UL (ref 4.31–10.16)

## 2023-03-23 RX ORDER — MORPHINE SULFATE 4 MG/ML
1 INJECTION, SOLUTION INTRAMUSCULAR; INTRAVENOUS ONCE
Status: COMPLETED | OUTPATIENT
Start: 2023-03-23 | End: 2023-03-23

## 2023-03-23 RX ORDER — LORAZEPAM 2 MG/ML
1 INJECTION INTRAMUSCULAR ONCE
Status: COMPLETED | OUTPATIENT
Start: 2023-03-23 | End: 2023-03-23

## 2023-03-23 RX ORDER — FENTANYL CITRATE 50 UG/ML
50 INJECTION, SOLUTION INTRAMUSCULAR; INTRAVENOUS ONCE
Status: COMPLETED | OUTPATIENT
Start: 2023-03-23 | End: 2023-03-23

## 2023-03-23 RX ORDER — ONDANSETRON 2 MG/ML
1 INJECTION INTRAMUSCULAR; INTRAVENOUS ONCE
Status: COMPLETED | OUTPATIENT
Start: 2023-03-23 | End: 2023-03-23

## 2023-03-23 RX ADMIN — SODIUM CHLORIDE 1000 ML: 0.9 INJECTION, SOLUTION INTRAVENOUS at 22:59

## 2023-03-23 RX ADMIN — SODIUM CHLORIDE 1000 ML: 0.9 INJECTION, SOLUTION INTRAVENOUS at 22:58

## 2023-03-23 RX ADMIN — LORAZEPAM 1 MG: 2 INJECTION INTRAMUSCULAR; INTRAVENOUS at 23:03

## 2023-03-23 RX ADMIN — IOHEXOL 100 ML: 350 INJECTION, SOLUTION INTRAVENOUS at 22:50

## 2023-03-23 RX ADMIN — FENTANYL CITRATE 50 MCG: 50 INJECTION INTRAMUSCULAR; INTRAVENOUS at 23:03

## 2023-03-24 PROBLEM — K56.609 SMALL BOWEL OBSTRUCTION (HCC): Status: ACTIVE | Noted: 2023-03-24

## 2023-03-24 PROBLEM — R41.3 MEMORY LOSS: Status: ACTIVE | Noted: 2023-03-24

## 2023-03-24 LAB
2HR DELTA HS TROPONIN: 6 NG/L
4HR DELTA HS TROPONIN: 13 NG/L
ALBUMIN SERPL BCP-MCNC: 4.1 G/DL (ref 3.5–5)
ALP SERPL-CCNC: 78 U/L (ref 34–104)
ALT SERPL W P-5'-P-CCNC: 9 U/L (ref 7–52)
ANION GAP SERPL CALCULATED.3IONS-SCNC: 9 MMOL/L (ref 4–13)
AST SERPL W P-5'-P-CCNC: 12 U/L (ref 13–39)
ATRIAL RATE: 106 BPM
ATRIAL RATE: 89 BPM
ATRIAL RATE: 99 BPM
BACTERIA UR QL AUTO: ABNORMAL /HPF
BASOPHILS # BLD AUTO: 0.01 THOUSANDS/ÂΜL (ref 0–0.1)
BASOPHILS NFR BLD AUTO: 0 % (ref 0–1)
BILIRUB SERPL-MCNC: 1.24 MG/DL (ref 0.2–1)
BILIRUB UR QL STRIP: NEGATIVE
BUN SERPL-MCNC: 16 MG/DL (ref 5–25)
CALCIUM SERPL-MCNC: 8.3 MG/DL (ref 8.4–10.2)
CARDIAC TROPONIN I PNL SERPL HS: 10 NG/L
CARDIAC TROPONIN I PNL SERPL HS: 17 NG/L
CHLORIDE SERPL-SCNC: 111 MMOL/L (ref 96–108)
CLARITY UR: CLEAR
CO2 SERPL-SCNC: 22 MMOL/L (ref 21–32)
COLOR UR: YELLOW
CREAT SERPL-MCNC: 0.81 MG/DL (ref 0.6–1.3)
EOSINOPHIL # BLD AUTO: 0 THOUSAND/ÂΜL (ref 0–0.61)
EOSINOPHIL NFR BLD AUTO: 0 % (ref 0–6)
ERYTHROCYTE [DISTWIDTH] IN BLOOD BY AUTOMATED COUNT: 15 % (ref 11.6–15.1)
FLUAV RNA RESP QL NAA+PROBE: NEGATIVE
FLUBV RNA RESP QL NAA+PROBE: NEGATIVE
GFR SERPL CREATININE-BSD FRML MDRD: 79 ML/MIN/1.73SQ M
GLUCOSE SERPL-MCNC: 140 MG/DL (ref 65–140)
GLUCOSE UR STRIP-MCNC: NEGATIVE MG/DL
HCT VFR BLD AUTO: 44.2 % (ref 34.8–46.1)
HGB BLD-MCNC: 14.6 G/DL (ref 11.5–15.4)
HGB UR QL STRIP.AUTO: ABNORMAL
IMM GRANULOCYTES # BLD AUTO: 0.03 THOUSAND/UL (ref 0–0.2)
IMM GRANULOCYTES NFR BLD AUTO: 0 % (ref 0–2)
KETONES UR STRIP-MCNC: NEGATIVE MG/DL
LACTATE SERPL-SCNC: 1.9 MMOL/L (ref 0.5–2)
LEUKOCYTE ESTERASE UR QL STRIP: ABNORMAL
LYMPHOCYTES # BLD AUTO: 0.55 THOUSANDS/ÂΜL (ref 0.6–4.47)
LYMPHOCYTES NFR BLD AUTO: 8 % (ref 14–44)
MAGNESIUM SERPL-MCNC: 2.5 MG/DL (ref 1.9–2.7)
MCH RBC QN AUTO: 29.4 PG (ref 26.8–34.3)
MCHC RBC AUTO-ENTMCNC: 33 G/DL (ref 31.4–37.4)
MCV RBC AUTO: 89 FL (ref 82–98)
MONOCYTES # BLD AUTO: 0.79 THOUSAND/ÂΜL (ref 0.17–1.22)
MONOCYTES NFR BLD AUTO: 11 % (ref 4–12)
NEUTROPHILS # BLD AUTO: 5.61 THOUSANDS/ÂΜL (ref 1.85–7.62)
NEUTS SEG NFR BLD AUTO: 81 % (ref 43–75)
NITRITE UR QL STRIP: NEGATIVE
NON-SQ EPI CELLS URNS QL MICRO: ABNORMAL /HPF
NRBC BLD AUTO-RTO: 0 /100 WBCS
P AXIS: 55 DEGREES
P AXIS: 59 DEGREES
P AXIS: 72 DEGREES
PH UR STRIP.AUTO: 7.5 [PH] (ref 4.5–8)
PLATELET # BLD AUTO: 382 THOUSANDS/UL (ref 149–390)
PMV BLD AUTO: 8.8 FL (ref 8.9–12.7)
POTASSIUM SERPL-SCNC: 3.8 MMOL/L (ref 3.5–5.3)
PR INTERVAL: 124 MS
PR INTERVAL: 134 MS
PR INTERVAL: 166 MS
PROT SERPL-MCNC: 6.5 G/DL (ref 6.4–8.4)
PROT UR STRIP-MCNC: NEGATIVE MG/DL
QRS AXIS: 108 DEGREES
QRS AXIS: 114 DEGREES
QRS AXIS: 115 DEGREES
QRSD INTERVAL: 80 MS
QRSD INTERVAL: 84 MS
QRSD INTERVAL: 92 MS
QT INTERVAL: 374 MS
QT INTERVAL: 432 MS
QT INTERVAL: 458 MS
QTC INTERVAL: 496 MS
QTC INTERVAL: 554 MS
QTC INTERVAL: 557 MS
RBC # BLD AUTO: 4.97 MILLION/UL (ref 3.81–5.12)
RBC #/AREA URNS AUTO: ABNORMAL /HPF
RSV RNA RESP QL NAA+PROBE: NEGATIVE
SARS-COV-2 RNA RESP QL NAA+PROBE: NEGATIVE
SODIUM SERPL-SCNC: 142 MMOL/L (ref 135–147)
SP GR UR STRIP.AUTO: 1.01 (ref 1–1.03)
T WAVE AXIS: 13 DEGREES
T WAVE AXIS: 31 DEGREES
T WAVE AXIS: 39 DEGREES
UROBILINOGEN UR QL STRIP.AUTO: 0.2 E.U./DL
VENTRICULAR RATE: 106 BPM
VENTRICULAR RATE: 89 BPM
VENTRICULAR RATE: 99 BPM
WBC # BLD AUTO: 6.99 THOUSAND/UL (ref 4.31–10.16)
WBC #/AREA URNS AUTO: ABNORMAL /HPF

## 2023-03-24 RX ORDER — FERROUS SULFATE 325(65) MG
325 TABLET ORAL
Status: DISCONTINUED | OUTPATIENT
Start: 2023-03-24 | End: 2023-04-06 | Stop reason: HOSPADM

## 2023-03-24 RX ORDER — FENTANYL CITRATE 50 UG/ML
50 INJECTION, SOLUTION INTRAMUSCULAR; INTRAVENOUS ONCE
Status: COMPLETED | OUTPATIENT
Start: 2023-03-24 | End: 2023-03-24

## 2023-03-24 RX ORDER — MORPHINE SULFATE 4 MG/ML
4 INJECTION, SOLUTION INTRAMUSCULAR; INTRAVENOUS EVERY 4 HOURS PRN
Status: DISCONTINUED | OUTPATIENT
Start: 2023-03-24 | End: 2023-03-25

## 2023-03-24 RX ORDER — ATORVASTATIN CALCIUM 40 MG/1
40 TABLET, FILM COATED ORAL
Status: DISCONTINUED | OUTPATIENT
Start: 2023-03-24 | End: 2023-04-06 | Stop reason: HOSPADM

## 2023-03-24 RX ORDER — HYDROXYZINE HYDROCHLORIDE 25 MG/1
50 TABLET, FILM COATED ORAL 3 TIMES DAILY PRN
Status: DISCONTINUED | OUTPATIENT
Start: 2023-03-24 | End: 2023-04-06 | Stop reason: HOSPADM

## 2023-03-24 RX ORDER — MIRTAZAPINE 15 MG/1
7.5 TABLET, FILM COATED ORAL EVERY EVENING
Status: DISCONTINUED | OUTPATIENT
Start: 2023-03-24 | End: 2023-04-06 | Stop reason: HOSPADM

## 2023-03-24 RX ORDER — BUSPIRONE HYDROCHLORIDE 10 MG/1
20 TABLET ORAL 3 TIMES DAILY
Status: DISCONTINUED | OUTPATIENT
Start: 2023-03-24 | End: 2023-04-06 | Stop reason: HOSPADM

## 2023-03-24 RX ORDER — LITHIUM CARBONATE 300 MG/1
300 TABLET, FILM COATED, EXTENDED RELEASE ORAL
Status: DISCONTINUED | OUTPATIENT
Start: 2023-03-24 | End: 2023-04-06 | Stop reason: HOSPADM

## 2023-03-24 RX ORDER — AMLODIPINE BESYLATE 10 MG/1
10 TABLET ORAL DAILY
Status: DISCONTINUED | OUTPATIENT
Start: 2023-03-24 | End: 2023-04-06 | Stop reason: HOSPADM

## 2023-03-24 RX ORDER — PRAZOSIN HYDROCHLORIDE 2 MG/1
2 CAPSULE ORAL
Status: DISCONTINUED | OUTPATIENT
Start: 2023-03-24 | End: 2023-04-06 | Stop reason: HOSPADM

## 2023-03-24 RX ORDER — MAGNESIUM HYDROXIDE/ALUMINUM HYDROXICE/SIMETHICONE 120; 1200; 1200 MG/30ML; MG/30ML; MG/30ML
30 SUSPENSION ORAL EVERY 6 HOURS PRN
Status: DISCONTINUED | OUTPATIENT
Start: 2023-03-24 | End: 2023-04-06 | Stop reason: HOSPADM

## 2023-03-24 RX ORDER — SODIUM CHLORIDE 9 MG/ML
75 INJECTION, SOLUTION INTRAVENOUS CONTINUOUS
Status: DISCONTINUED | OUTPATIENT
Start: 2023-03-24 | End: 2023-03-30

## 2023-03-24 RX ORDER — POTASSIUM CHLORIDE 14.9 MG/ML
20 INJECTION INTRAVENOUS ONCE
Status: COMPLETED | OUTPATIENT
Start: 2023-03-24 | End: 2023-03-24

## 2023-03-24 RX ORDER — ONDANSETRON 2 MG/ML
4 INJECTION INTRAMUSCULAR; INTRAVENOUS EVERY 6 HOURS PRN
Status: DISCONTINUED | OUTPATIENT
Start: 2023-03-24 | End: 2023-04-06 | Stop reason: HOSPADM

## 2023-03-24 RX ORDER — LIDOCAINE HYDROCHLORIDE 20 MG/ML
1 JELLY TOPICAL ONCE
Status: COMPLETED | OUTPATIENT
Start: 2023-03-24 | End: 2023-03-24

## 2023-03-24 RX ORDER — PREGABALIN 50 MG/1
50 CAPSULE ORAL 2 TIMES DAILY
Status: DISCONTINUED | OUTPATIENT
Start: 2023-03-24 | End: 2023-04-06 | Stop reason: HOSPADM

## 2023-03-24 RX ORDER — ASPIRIN 81 MG/1
81 TABLET ORAL DAILY
Status: DISCONTINUED | OUTPATIENT
Start: 2023-03-24 | End: 2023-04-06 | Stop reason: HOSPADM

## 2023-03-24 RX ORDER — PAROXETINE HYDROCHLORIDE 20 MG/1
60 TABLET, FILM COATED ORAL
Status: DISCONTINUED | OUTPATIENT
Start: 2023-03-24 | End: 2023-04-06 | Stop reason: HOSPADM

## 2023-03-24 RX ORDER — PANTOPRAZOLE SODIUM 20 MG/1
20 TABLET, DELAYED RELEASE ORAL
Status: DISCONTINUED | OUTPATIENT
Start: 2023-03-24 | End: 2023-03-24

## 2023-03-24 RX ORDER — LORAZEPAM 1 MG/1
0.5 TABLET ORAL 2 TIMES DAILY PRN
Status: DISCONTINUED | OUTPATIENT
Start: 2023-03-24 | End: 2023-04-06 | Stop reason: HOSPADM

## 2023-03-24 RX ORDER — BENZTROPINE MESYLATE 1 MG/1
1 TABLET ORAL 2 TIMES DAILY
Status: DISCONTINUED | OUTPATIENT
Start: 2023-03-24 | End: 2023-04-06 | Stop reason: HOSPADM

## 2023-03-24 RX ORDER — MAGNESIUM SULFATE HEPTAHYDRATE 40 MG/ML
2 INJECTION, SOLUTION INTRAVENOUS ONCE
Status: COMPLETED | OUTPATIENT
Start: 2023-03-24 | End: 2023-03-24

## 2023-03-24 RX ORDER — HYDROMORPHONE HCL/PF 1 MG/ML
0.5 SYRINGE (ML) INJECTION EVERY 4 HOURS PRN
Status: DISCONTINUED | OUTPATIENT
Start: 2023-03-24 | End: 2023-03-25

## 2023-03-24 RX ORDER — SODIUM CHLORIDE, SODIUM GLUCONATE, SODIUM ACETATE, POTASSIUM CHLORIDE, MAGNESIUM CHLORIDE, SODIUM PHOSPHATE, DIBASIC, AND POTASSIUM PHOSPHATE .53; .5; .37; .037; .03; .012; .00082 G/100ML; G/100ML; G/100ML; G/100ML; G/100ML; G/100ML; G/100ML
150 INJECTION, SOLUTION INTRAVENOUS CONTINUOUS
Status: DISCONTINUED | OUTPATIENT
Start: 2023-03-24 | End: 2023-03-24

## 2023-03-24 RX ORDER — FOLIC ACID 1 MG/1
1000 TABLET ORAL DAILY
Status: DISCONTINUED | OUTPATIENT
Start: 2023-03-24 | End: 2023-04-06 | Stop reason: HOSPADM

## 2023-03-24 RX ORDER — PANTOPRAZOLE SODIUM 40 MG/10ML
40 INJECTION, POWDER, LYOPHILIZED, FOR SOLUTION INTRAVENOUS
Status: DISCONTINUED | OUTPATIENT
Start: 2023-03-24 | End: 2023-03-31

## 2023-03-24 RX ORDER — QUETIAPINE FUMARATE 25 MG/1
50 TABLET, FILM COATED ORAL 3 TIMES DAILY
Status: DISCONTINUED | OUTPATIENT
Start: 2023-03-24 | End: 2023-04-06 | Stop reason: HOSPADM

## 2023-03-24 RX ORDER — ACETAMINOPHEN 325 MG/1
650 TABLET ORAL EVERY 6 HOURS PRN
Status: DISCONTINUED | OUTPATIENT
Start: 2023-03-24 | End: 2023-04-06 | Stop reason: HOSPADM

## 2023-03-24 RX ORDER — LIDOCAINE HYDROCHLORIDE 20 MG/ML
1 JELLY TOPICAL ONCE
Status: DISCONTINUED | OUTPATIENT
Start: 2023-03-24 | End: 2023-03-24

## 2023-03-24 RX ADMIN — MORPHINE SULFATE 2 MG: 2 INJECTION, SOLUTION INTRAMUSCULAR; INTRAVENOUS at 04:27

## 2023-03-24 RX ADMIN — BUSPIRONE HYDROCHLORIDE 20 MG: 10 TABLET ORAL at 21:20

## 2023-03-24 RX ADMIN — MAGNESIUM SULFATE HEPTAHYDRATE 2 G: 40 INJECTION, SOLUTION INTRAVENOUS at 01:13

## 2023-03-24 RX ADMIN — FENTANYL CITRATE 50 MCG: 50 INJECTION, SOLUTION INTRAMUSCULAR; INTRAVENOUS at 01:13

## 2023-03-24 RX ADMIN — LITHIUM CARBONATE 300 MG: 300 TABLET, FILM COATED, EXTENDED RELEASE ORAL at 21:23

## 2023-03-24 RX ADMIN — ONDANSETRON 4 MG: 2 INJECTION INTRAMUSCULAR; INTRAVENOUS at 09:11

## 2023-03-24 RX ADMIN — PRAZOSIN HYDROCHLORIDE 2 MG: 2 CAPSULE ORAL at 21:23

## 2023-03-24 RX ADMIN — MORPHINE SULFATE 2 MG: 2 INJECTION, SOLUTION INTRAMUSCULAR; INTRAVENOUS at 12:05

## 2023-03-24 RX ADMIN — PANTOPRAZOLE SODIUM 40 MG: 40 INJECTION, POWDER, FOR SOLUTION INTRAVENOUS at 09:11

## 2023-03-24 RX ADMIN — QUETIAPINE FUMARATE 50 MG: 25 TABLET ORAL at 21:22

## 2023-03-24 RX ADMIN — ATORVASTATIN CALCIUM 40 MG: 40 TABLET, FILM COATED ORAL at 17:28

## 2023-03-24 RX ADMIN — HYDROMORPHONE HYDROCHLORIDE 0.5 MG: 1 INJECTION, SOLUTION INTRAMUSCULAR; INTRAVENOUS; SUBCUTANEOUS at 21:26

## 2023-03-24 RX ADMIN — SODIUM CHLORIDE 150 ML/HR: 0.9 INJECTION, SOLUTION INTRAVENOUS at 01:13

## 2023-03-24 RX ADMIN — ONDANSETRON 4 MG: 2 INJECTION INTRAMUSCULAR; INTRAVENOUS at 04:27

## 2023-03-24 RX ADMIN — MORPHINE SULFATE 2 MG: 2 INJECTION, SOLUTION INTRAMUSCULAR; INTRAVENOUS at 18:25

## 2023-03-24 RX ADMIN — PREGABALIN 50 MG: 50 CAPSULE ORAL at 17:28

## 2023-03-24 RX ADMIN — HYDROMORPHONE HYDROCHLORIDE 0.5 MG: 1 INJECTION, SOLUTION INTRAMUSCULAR; INTRAVENOUS; SUBCUTANEOUS at 06:31

## 2023-03-24 RX ADMIN — PAROXETINE 60 MG: 20 TABLET, FILM COATED ORAL at 21:21

## 2023-03-24 RX ADMIN — PANTOPRAZOLE SODIUM 20 MG: 20 TABLET, DELAYED RELEASE ORAL at 06:21

## 2023-03-24 RX ADMIN — PAROXETINE 30 MG: 20 TABLET, FILM COATED ORAL at 17:28

## 2023-03-24 RX ADMIN — BENZTROPINE MESYLATE 1 MG: 1 TABLET ORAL at 17:28

## 2023-03-24 RX ADMIN — POTASSIUM CHLORIDE 20 MEQ: 14.9 INJECTION, SOLUTION INTRAVENOUS at 02:02

## 2023-03-24 RX ADMIN — MIRTAZAPINE 7.5 MG: 15 TABLET, FILM COATED ORAL at 17:28

## 2023-03-24 RX ADMIN — LIDOCAINE HYDROCHLORIDE 1 APPLICATION.: 20 JELLY TOPICAL at 08:53

## 2023-03-24 NOTE — H&P
[address] [facility]  H&P  Name: Shaista Pettit  MRN: [id number]  Unit/Bed#: ED-04 I Date of Admission: 3/23/2023   Date of Service: 3/24/2023 I Hospital Day: 0      Assessment/Plan   * Small bowel obstruction [facility])  Assessment & Plan  · Patient presented to the ED following multiple episodes of vomiting at home x one day  She reports two episodes of diarrhea at home, now resolved  Patient reports since getting treatment in the ED, she has only vomited twice  · CT abdomen/pelvis demonstrating concern for distal small bowel obstruction  · Lactic acid 5 2-->1 9 following 2L fluids in ED  · Lipase <7  · Viral panel negative  · WBC 10 43, afebrile  · Surgery consulted while in ED, appreciate recommendations  · IV fluids  · IV antiemetics  · NPO for now   · Would like to avoid NG tube if possible given tardive dyskinesia  · Will continue to follow    Tardive dyskinesia  Assessment & Plan  · Patient with chronic tardive dyskinesia secondary to psychiatric medications  · Would like to avoid NG tube treatment for SBO if possible due to this  · Continue 1 mg cogentin b i d and 40 mg ingrezza daily    Bipolar II disorder ([facility] [address] )  Assessment & Plan  · Patient mood is stable with tardive dyskinesia at baseline  · Continue home medication regimen  · 50 mg seroquel t i d  · 300 mg lithium h s    · 30 mg paxil b i d  · 60 mg paxil h s   · 20 mg buspar t i d  · 40 mg ingrezza daily  · 7 5 mg remeron h s   · 50 mg hydroxyzine b i d prn    Essential hypertension  Assessment & Plan  · Continue amlodipine     Mixed hyperlipidemia  Assessment & Plan  · Continue statin    Memory loss  Assessment & Plan  · Patient not great historian   · Followed outpatient by neurology for this issue   · Scheduled to follow-up outpatient with neuropsych after ENT hearing evaluation completed    Chronic pain disorder  Assessment & Plan  · Continue 50 mg pregabalin b i d      VTE Pharmacologic Prophylaxis: VTE Score: 2 Low Risk (Score "0-2) - Encourage Ambulation  Code Status: Level 1 - Full Code   Discussion with family: Call in AM      Anticipated Length of Stay: Patient will be admitted on an inpatient basis with an anticipated length of stay of greater than 2 midnights secondary to SBO  Total Time Spent on Date of Encounter in care of patient: 75 minutes This time was spent on one or more of the following: performing physical exam; counseling and coordination of care; obtaining or reviewing history; documenting in the medical record; reviewing/ordering tests, medications or procedures; communicating with other healthcare professionals and discussing with patient's family/caregivers  Chief Complaint: \"I was vomiting in my bathroom\" x one day    History of Present Illness:  Samantha Sotelo is a 59 y o  female with a PMH of who presents with N/V and abdominal pain x one day  History limited as patient poor historian and has memory impairment  She reports that she was vomiting all over her bathroom for about a day prior to coming in  She had two episodes of diarrhea at home, but none in the ED  Since getting to the ED, she reports feeling less nausea, but still has a significant amount of abdominal pain  Patient reports that the most significant amount of pain she has is in her epigastric region of her abdomen, but she feels diffuse pain throughout as well  She also reports chest pain, but when asked where it is, points to her epigastric area  Review of Systems:  Review of Systems   Unable to perform ROS: Psychiatric disorder       Past Medical and Surgical History:   Past Medical History:   Diagnosis Date   • Anxiety    • Arthritis     left knee   • At risk for falls    • Bipolar 2 disorder ([facility] [address] )     FOLLOWS WITH PSYCHIATRIST   CONTINUE LAMOTRIGINE; RESOLVED: [id number]   • Depression    • Familial tremor     both hands   • Fibromyalgia     LAST ASSESSED: [id number]   • GERD (gastroesophageal reflux disease)    • Hearing aid worn     " left ear   • Lime (hard of hearing)     left ear   • Hyperlipidemia    • Hypertension    • Left-sided weakness    • Memory loss of unknown cause     long and short term   • Migraine    • Obesity    • Obesity, Class II, BMI 35-39 9    • Overactive bladder    • Panic attack    • Post traumatic stress disorder    • S/P insertion of spinal cord stimulator     no remote   • Seasonal allergies    • Seizures (HCC)     possible seizure like activity   • Stroke [facility])     questionable stroke 2009   • Tardive dyskinesia     PATIENT STATES   • 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    CONT ASA 81 MG DAILY; RESOLVED: [id number]   • Urinary incontinence    • Uses walker    • Wears dentures     partial lower / full upper- doesnt wear   • Wears glasses        Past Surgical History:   Procedure Laterality Date   • BACK SURGERY     • [address]   • COLONOSCOPY      RESOLVED: [id number]   • EAR SURGERY     • EGD     • HYSTERECTOMY  2004   • MYRINGOTOMY W/ TUBES Left    • NECK SURGERY  04/2019   • MO ARTHRP KNE CONDYLE&PLATU MEDIAL&LAT COMPARTMENTS Right 3/9/2022    Procedure: ARTHROPLASTY KNEE TOTAL;  Surgeon: Nadira Rivera DO;  Location: [facility];  Service: Orthopedics   • MO ARTHRP KNE CONDYLE&PLATU MEDIAL&LAT COMPARTMENTS Left 7/5/2022    Procedure: ARTHROPLASTY KNEE TOTAL;  Surgeon: Nadira Rivera DO;  Location: [facility];  Service: Orthopedics   • MO CYSTOURETHROSCOPY N/A 2/18/2016    Procedure: CYSTOSCOPY, BOTOX INJECTION;  Surgeon: Caitlyn Stein MD;  Location: [facility];  Service: Gynecology   • MO INSJ/RPLCMT SPI NPGR DIR/INDUXIVE COUPLING Right 2/10/2021    Procedure: REPLACEMENT IMPLANTABLE PULSE GENERATOR DORSAL SPINAL COLUMN STIMULATOR, RIGHT;  Surgeon: Marcia Wallace MD;  [facility]: [facility];  Service: Neurosurgery   • MO PRQ IMPLTJ NSTIM ELECTRODE ARRAY EPIDURAL Right 7/28/2020    Procedure: INSERTION THORACIC DORSAL COLUMN SPINAL CORD STIMULATOR PERCUTANEOUS W IMPLANTABLE PULSE GENERATOR, RIGHT;  Surgeon: Lenn Brunner, MD;  Location:  MAIN OR;  Service: Neurosurgery   • TONSILLECTOMY     • TUBAL LIGATION  1986   • UPPER GASTROINTESTINAL ENDOSCOPY  09/2020       Meds/Allergies:  Prior to Admission medications    Medication Sig Start Date End Date Taking? Authorizing Provider   acetaminophen (Acetaminophen Extra Strength) 500 mg tablet Take 1 tablet (500 mg total) by mouth every 6 (six) hours as needed for mild pain 2/17/23   Glo Valente,    amLODIPine (NORVASC) 10 mg tablet TAKE 1 TABLET BY MOUTH EVERY DAY 7/26/22   Glo Valente, DO   ascorbic acid (VITAMIN C) 500 MG tablet Take 1 tablet (500 mg total) by mouth daily 6/2/22   Austyn Pope, DO   aspirin (ECOTRIN LOW STRENGTH) 81 mg EC tablet Take 1 tablet (81 mg total) by mouth daily 10/12/22   Fiordaliza Parker,    atorvastatin (LIPITOR) 40 mg tablet TAKE 1 TABLET BY MOUTH EVERY DAY 4/14/22   Glo Valente, DO   benztropine (COGENTIN) 1 mg tablet Take 1 mg by mouth 2 (two) times a day 4/12/22   Historical Provider, MD   busPIRone (BUSPAR) 10 mg tablet Take 20 mg by mouth 3 (three) times a day  11/8/21   Historical Provider, MD   Diclofenac Sodium (VOLTAREN) 1 % Apply 2 g topically 4 (four) times a day 2/17/23   Glo Valente DO   docusate sodium (COLACE) 100 mg capsule Take 1 capsule (100 mg total) by mouth 2 (two) times a day 7/7/22   Raven Tam PA-C   ferrous sulfate 324 (65 Fe) mg Take 1 tablet (324 mg total) by mouth daily 6/2/22   Austyn Pope,    folic acid (FOLVITE) 1 mg tablet TAKE 1 TABLET BY MOUTH EVERY DAY 6/16/22   Raven Tam PA-C   hydrOXYzine HCL (ATARAX) 50 mg tablet Take 50 mg by mouth 3 (three) times a day as needed for itching    Historical Provider, MD   Ingrezza 40 MG CAPS TAKE 1 CAPSULE (40MG) BY MOUTH ONCE DAILY   2/23/23   Historical Provider, MD   lithium carbonate (LITHOBID) 300 mg CR tablet TAKE 1 TABLET BY MOUTH DAILY AT BEDTIME 2/1/23   Glo Valente,  LORazepam (ATIVAN) 0 5 mg tablet Take 1 tablet (0 5 mg total) by mouth 2 (two) times a day as needed for anxiety for up to 10 days 3/17/22 2/17/23  Eleni Minor DO   mirtazapine (REMERON) 7 5 MG tablet TAKE 1 TABLET BY MOUTH EVERY EVENING  3/13/23   Glo Valente DO   Multiple Vitamin (multivitamin) tablet Take 1 tablet by mouth daily 6/2/22   Chaparro Tuttle DO   naloxone [facility]) 4 mg/0 1 mL nasal spray Administer 1 spray into a nostril  If no response after 2-3 minutes, give another dose in the other nostril using a new spray  4/11/22   May Garcia PA-C   pantoprazole (PROTONIX) 20 mg tablet Take 20 mg by mouth daily    Historical Provider, MD   PARoxetine (PAXIL) 30 mg tablet Take 60 mg by mouth 2 (two) times a day  Takes two (60 mg) tabs by mouth nightly   Indications: Social Anxiety Disorder    Historical Provider, MD   prazosin (MINIPRESS) 2 mg capsule Take 2 mg by mouth daily at bedtime  Indications: Frightening Dreams    Historical Provider, MD   pregabalin (LYRICA) 50 mg capsule 1 capsule twice a day  May increase to 1 capsule 3 times per day if needed  3/9/23   MERCY Sullivan   QUEtiapine (SEROquel) 50 mg tablet TAKE 1 TAB (50 MG) BY MOUTH 3 TIMES A DAY  (9 AM, 4 PM, AND AT BEDTIME) 2/7/23   Historical Provider, MD     I have reviewed home medications using recent Epic encounter      Allergies: No Known Allergies    Social History:  Marital Status:    Patient Pre-hospital Living Situation: Apartment  Patient Pre-hospital Level of Mobility: walks with walker  Patient Pre-hospital Diet Restrictions: None  Substance Use History:   Social History     Substance and Sexual Activity   Alcohol Use Not Currently    Comment: 2 x year; being a social drinker as per all scripts      Social History     Tobacco Use   Smoking Status Never   Smokeless Tobacco Never     Social History     Substance and Sexual Activity   Drug Use No       Family History:  Family History   Problem Relation Age of Onset   • Colon cancer Mother    • Alzheimer's disease Father    • Stroke Father    • Colon cancer Brother    • Bipolar disorder Brother    • Breast cancer Maternal Aunt    • Colon cancer Maternal Aunt    • Heart disease Other    • Diabetes Other    • Hypertension Other    • Seizures Son    • Depression Paternal Grandfather    • No Known Problems Sister    • No Known Problems Brother    • Thyroid disease Neg Hx        Physical Exam:     Vitals:   Blood Pressure: 129/92 (03/24/23 0130)  Pulse: 90 (03/24/23 0130)  Temperature: 98 8 °F (37 1 °C) (03/23/23 2143)  Temp Source: Oral (03/23/23 2143)  Respirations: 18 (03/24/23 0130)  Weight - Scale: 72 5 kg (159 lb 13 3 oz) (03/23/23 2151)  SpO2: 95 % (03/24/23 0130)    Physical Exam  Vitals and nursing note reviewed  Constitutional:       General: She is not in acute distress  Appearance: She is well-developed  She is obese  She is ill-appearing  HENT:      Head: Normocephalic and atraumatic  Nose: Nose normal  No congestion  Mouth/Throat:      Mouth: Mucous membranes are dry  Pharynx: Oropharynx is clear  Eyes:      Conjunctiva/sclera: Conjunctivae normal    Cardiovascular:      Rate and Rhythm: Regular rhythm  Tachycardia present  Heart sounds: Normal heart sounds  No murmur heard  No friction rub  No gallop  Pulmonary:      Effort: Pulmonary effort is normal  No respiratory distress  Breath sounds: Normal breath sounds  No wheezing, rhonchi or rales  Abdominal:      General: Bowel sounds are normal  There is distension  Palpations: Abdomen is soft  Tenderness: There is abdominal tenderness (diffuse)  There is guarding  Musculoskeletal:      Cervical back: Neck supple  Right lower leg: No edema  Left lower leg: No edema  Skin:     General: Skin is warm and dry  Capillary Refill: Capillary refill takes less than 2 seconds  Neurological:      General: No focal deficit present        Mental Status: She is alert and oriented to person, place, and time  Mental status is at baseline  Psychiatric:         Mood and Affect: Mood normal          Behavior: Behavior is withdrawn  Cognition and Memory: Memory is impaired  Additional Data:     Lab Results:  Results from last 7 days   Lab Units 03/23/23 2157   WBC Thousand/uL 10 43*   HEMOGLOBIN g/dL 16 6*   HEMATOCRIT % 49 3*   PLATELETS Thousands/uL 486*   BANDS PCT % 1   LYMPHO PCT % 11*   MONO PCT % 3*   EOS PCT % 0     Results from last 7 days   Lab Units 03/23/23 2157   SODIUM mmol/L 140   POTASSIUM mmol/L 3 3*   CHLORIDE mmol/L 99   CO2 mmol/L 21   BUN mg/dL 13   CREATININE mg/dL 1 18   ANION GAP mmol/L 20*   CALCIUM mg/dL 10 3*   ALBUMIN g/dL 5 0   TOTAL BILIRUBIN mg/dL 2 05*   ALK PHOS U/L 100   ALT U/L 13   AST U/L 16   GLUCOSE RANDOM mg/dL 233*     Results from last 7 days   Lab Units 03/23/23 2157   INR  0 93             Results from last 7 days   Lab Units 03/24/23  0055 03/23/23 2157   LACTIC ACID mmol/L 1 9 5 2*       Lines/Drains:  Invasive Devices     Peripheral Intravenous Line  Duration           Peripheral IV Left Antecubital -- days    Peripheral IV 03/24/23 Proximal;Right;Ventral (anterior) Forearm <1 day                    Imaging: Reviewed radiology reports from this admission including: abdominal/pelvic CT  CT chest abdomen pelvis w contrast    (Results Pending)       EKG and Other Studies Reviewed on Admission:   · EKG: Sinus Tachycardia    QT prolongation    ** Please Note: This note has been constructed using a voice recognition system   **

## 2023-03-24 NOTE — ED NOTES
"TT to admitting attending: \"Good morning, [place] [facility] [address] [address]    Received in report patient to have NG tube placed  No order found  Pt c/o abdominal and chest pain at this time  Pt NPO but so meds ordered are oral  Please advise  Thank you! \"  Response from Dr Jacqueline Mckeon, \"Good morning, have you reached out to surgery? I was told they evaluate me the patient  \"     Beverley Tam  03/24/23 4856    TT to surgery resident Dr Miguel Lyman re: same  Per Dr Miguel Lyman \" Sure thing will add order and am headed into her chart now  Will also order urojet for comfort with placement  \"     Beverley Tam  03/24/23 8417    "

## 2023-03-24 NOTE — ED NOTES
Dr Yudith White aware that pt vomited again after being seen by PT/OT        Reno Yoon, MARIA  03/24/23 6122

## 2023-03-24 NOTE — PHYSICAL THERAPY NOTE
PT EVALUATION    Pt  Name: Brooklyn Patton  Pt  Age: 59 y o    MRN: [id number]  LENGTH OF STAY: 0    Patient Active Problem List   Diagnosis    Right knee pain    Chronic pain disorder    Lumbar radiculopathy    Lumbar spondylosis    Fibromyalgia    Lumbar disc disease with radiculopathy    Spinal stenosis of lumbar region with neurogenic claudication    Pain in joint, shoulder region    Intractable low back pain    Bilateral hip pain    Bipolar II disorder (HCC)    Cognitive disorder    Esophageal reflux    Fatigue    Female pelvic pain    Generalized anxiety disorder    Essential hypertension    History of hypokalemia    Insomnia    Major depressive disorder, recurrent episode, moderate degree (HCC)    Migraine    Opioid dependence (HCC)    Osteoarthritis of both hips    Osteoarthritis of knee    Overactive bladder    Left hip pain    Panic disorder without agoraphobia    Post traumatic stress disorder    Primary localized osteoarthritis of both knees    Recent unexplained weight loss    Sacroiliitis ([facility])    Tremor    Urinary incontinence    Vitamin D deficiency    Post laminectomy syndrome    Encounter for long-term use of opiate analgesic    Family history of colorectal cancer    Complaint related to dreams    MIMI (obstructive sleep apnea)    Tardive dyskinesia    Primary osteoarthritis of both knees    Patellofemoral disorder of both knees    Rash    Superficial bacterial infection of skin    Scar of back    Impaired skin integrity associated with surgical incision    Status post insertion of spinal cord stimulator    Ambulatory dysfunction    Dysphagia    Arthritis of right knee    Multiple closed fractures of metatarsal bone of right foot    Chronic back pain    Nausea    Encephalopathy    History of CVA (cerebrovascular accident)    Medical clearance for psychiatric admission    Mixed hyperlipidemia    Leukopenia    Preoperative evaluation to rule out surgical contraindication    CVA (cerebral vascular accident) ([facility] [address] )    S/P total knee arthroplasty, right    Constipation    Hypokalemia    Seizure-like activity ([facility] [address] )    Status post total knee replacement, left    Anemia    Hemiplegia, post-stroke (HCC)    Small bowel obstruction (HCC)    Memory loss       Admitting Diagnoses:   Chest pain [R07 9]    Past Medical History:   Diagnosis Date    Anxiety     Arthritis     left knee    At risk for falls     Bipolar 2 disorder (HCC)     FOLLOWS WITH PSYCHIATRIST  CONTINUE LAMOTRIGINE; RESOLVED: [id number]    Depression     Familial tremor     both hands    Fibromyalgia     LAST ASSESSED: [id number]    GERD (gastroesophageal reflux disease)     Hearing aid worn     left ear    [place] (hard of hearing)     left ear    Hyperlipidemia     Hypertension     Left-sided weakness     Memory loss of unknown cause     long and short term    Migraine     Obesity     Obesity, Class II, BMI 35-39 9     Overactive bladder     Panic attack     Post traumatic stress disorder     S/P insertion of spinal cord stimulator     no remote    Seasonal allergies     Seizures ([facility] [address] )     possible seizure like activity    Stroke [facility])     questionable stroke 2009    Tardive dyskinesia     PATIENT STATES    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    CONT ASA 81 MG DAILY; RESOLVED: [id number]    Urinary incontinence     Uses walker     Wears dentures     partial lower / full upper- doesnt wear    Wears glasses        Past Surgical History:   Procedure Laterality Date    BACK SURGERY       SECTION      COLONOSCOPY      RESOLVED: [id number]    EAR SURGERY      EGD      HYSTERECTOMY  2004    MYRINGOTOMY W/ TUBES Left     NECK SURGERY  2019    DE ARTHRP KNE CONDYLE&PLATU MEDIAL&LAT COMPARTMENTS Right 3/9/2022    Procedure: ARTHROPLASTY KNEE TOTAL;  Surgeon: Smith Vargas DO;  [facility];  Service: Orthopedics    DE ARTHRP KNE CONDYLE&PLATU MEDIAL&LAT COMPARTMENTS Left 2022    Procedure: ARTHROPLASTY KNEE TOTAL;  Surgeon: Hanna Cook DO;  [facility];  Service: Orthopedics    OH CYSTOURETHROSCOPY N/A 2/18/2016    Procedure: CYSTOSCOPY, BOTOX INJECTION;  Surgeon: Erasto Zhou MD;  [facility]: [facility];  Service: Gynecology    OH INSJ/RPLCMT SPI NPGR DIR/INDUXIVE COUPLING Right 2/10/2021    Procedure: REPLACEMENT IMPLANTABLE PULSE GENERATOR DORSAL SPINAL COLUMN STIMULATOR, RIGHT;  Surgeon: Latonya Mcintyre MD;  [facility];  Service: Neurosurgery    OH PRQ [address] NSTIM ELECTRODE ARRAY EPIDURAL Right 7/28/2020    Procedure: INSERTION THORACIC DORSAL COLUMN SPINAL CORD STIMULATOR PERCUTANEOUS W IMPLANTABLE PULSE GENERATOR, RIGHT;  Surgeon: Latonya Mcintyre MD;  Location[facility];  Service: Neurosurgery    TONSILLECTOMY      TUBAL LIGATION  1986    UPPER GASTROINTESTINAL ENDOSCOPY  09/2020       Imaging Studies:  CT chest abdomen pelvis w contrast   Final Result by Ace Caballero MD (03/24 5859)   Small amount of perihepatic and pelvic fluid           Mid to distal high-grade small bowel obstruction possibly due to adhesions  Groundglass changes in the right lower lobe may be due to respiration or developing infiltrate  The study was marked in [facility] for immediate notification  Workstation performed: [id number]              03/24/23 1449   PT Last Visit   PT Visit Date 03/24/23   Note Type   Note type Evaluation   Pain Assessment   Pain Assessment Tool 0-10   Pain Score 7   Pain Location/Orientation Location: [facility]   Hospital Pain Intervention(s) Repositioned; Ambulation/increased activity; Emotional support; Rest   Restrictions/Precautions   Weight Bearing Precautions Per Order No   Other Precautions Cognitive;Multiple lines;Telemetry; Fall Risk;Pain   Home Living   Type of [address] Shower/Tub Tub/shower unit   [place]; Wheelchair-manual   Additional Comments pt poor historian; setup above from prior admission 7/22   Prior Function   Level of [place] Needs assistance with functional mobility; Needs assistance with ADLs; Needs assistance with IADLS   Lives With Family;Daughter  (CLARENCE, grandchildren)   Receives [facility] From [facility] in the last 6 months 0   Comments Pt poor historian however states use of RW for functional mobility and requiring assistance for ADLs and IADLs  Previous notes from 7/22 state pts CLARENCE was a paid caregiver to pt   General   Family/Caregiver Present No   Cognition   Overall Cognitive Status Impaired   Arousal/Participation Responsive   Attention Attends with cues to redirect   Orientation Level Oriented to person;Oriented to time;Oriented to situation;Disoriented to place   Following Commands Follows one step commands with increased time or repetition   Subjective   Subjective I'm sick   RUE Assessment   RUE Assessment   (refer to OT)   LUE Assessment   LUE Assessment   (refer to OT)   RLE Assessment   RLE Assessment WFL  (Able to perform functional tasks; MMT unable to be performed 2* abdominal discomfort)   LLE Assessment   LLE Assessment WFL  (Able to perform functional tasks; MMT unable to be performed 2* abdominal discomfort)   Bed Mobility   Rolling R 4  Minimal assistance   Additional items Assist x 1; Increased time required;Verbal cues;LE management   Rolling L 4  Minimal assistance   Additional items Assist x 1; Increased time required;Verbal cues;LE management   Supine to Sit 4  Minimal assistance   Additional items Assist x 1; Increased time required;Verbal cues;LE management   Sit to Supine 4  Minimal assistance   Additional items Assist x 1; Increased time required;Verbal cues;LE management   Additional Comments Cues for technique and safety   Transfers   Sit to Stand 4  Minimal assistance   Additional items Assist x 1; Increased time required;Verbal cues   Stand to Sit 4  Minimal assistance   Additional items Assist x 1; Increased time required;Verbal cues "  Additional Comments dec tolerance to OOB mobility due to abdominal discomfort   Ambulation/Elevation   Gait pattern Improper Weight shift; Poor UE support;Narrow CALLY; Decreased foot clearance; Redundant gait at times; Short stride; Excessively slow; Step to   Gait Assistance 4  Minimal assist   Additional items Assist x 1;Verbal cues; Tactile cues   Assistive Device Rolling walker   Distance 3 side steps towards [facility]   Ambulation/Elevation Additional Comments Cues for RW management and safety   Balance   Static Sitting Fair +   Dynamic Sitting Fair   Static Standing Fair -   Dynamic Standing Poor +   Ambulatory Poor +   Endurance Deficit   Endurance Deficit Yes   Endurance Deficit Description fatigue and pain   Activity Tolerance   Activity Tolerance Patient limited by fatigue;Treatment limited secondary to medical complications (Comment)   Medical Staff Made Aware OT Mick   Nurse Made Aware MARIA Pineda   Assessment   Prognosis Guarded   Problem List Decreased strength;Decreased endurance; Impaired balance;Decreased mobility; Decreased cognition; Impaired judgement;Decreased safety awareness;Pain   Assessment Pt  59 y  o female presents with abdominal pain with N/V/D and chest pain  Past medical hx includes CVA, seizure-like activity, chronic pain disorder, memory loss, dyskinesia, bipolar disorder, generative disc disease with spinal cord stimulator placement in July 2020  Per chart, CT abdomen/pelvis demonstrating concern for distal small bowel obstruction  Pt admitted for Small bowel obstruction ([facility] [address] ) w/ Chest pain (R07 9)  Pt referred to PT for functional mobility evaluation & D/C planning w/ orders of activity as tolerated  PTA, pt reports being (A) w/ ADL's, (A) w/ amb and use of RW  Pt poor historian with difficulty attending to tasks due to consistently saying \"I'm sick  \" Pain reported 7/10 in buttock  Pt reported chest pain and dizziness, BP /95   During evaluation, deficits included dec mobility, balance, " ambulation  Please see flow sheet above for objective findings and level of assistance required for safe completion of functional tasks  Required minAx1 for bed mobility, transfers, and ambulation  Pt able to side step 3 steps towards [facility] with use of RW and minAx1  Cues provided for RW management and safety  Pt demonstrated dec endurance and tolerance to activity  Pt was educated on fall precautions and reinforced w/ good understanding  Pt would benefit from continued PT to address deficits as defined above and maximize level of independence with functional mobility and safety  The patient's AM-PAC Basic Mobility Inpatient Short Form Raw Score is 14  A Raw score of less than or equal to 16 suggests the patient may benefit from discharge to post-acute rehabilitation services  Please also refer to the recommendation of the Physical Therapist for safe discharge planning  From PT/mobility standpoint recommendation for D/C to *STR vs HHPT pending progress, when medically cleared based on objective measures above, current function, and dec cognition  CM to follow  Nsg staff to continue to mobilized pt (OOB in chair for all meals & ambulate in room/unit) as tolerated to prevent further decline in function  Nsg notified  Co-eval performed to complete this PT evaluation for the pts best interest given pts medical complexity and functional level     Goals   Patient Goals to feel better   [facility] Expiration Date 04/07/23   Short Term Goal #1 1) Inc overall LE strength by 1/2 MMT grade to improve functional mobility; 2) Pt will demonstrate improved bed mobility with S to dec caregiver burden; 3) Pt will demonstrate improved transfers w/ S for inc safety; 4) Pt will be able to amb w/ S >150' w/ RW for household distances to inc safety and dec caregiver burden; 5) Pt will be able to navigate stairs with S to dec caregiver burden and inc safety with functional mobility; 6) Improve general balance by 1 grade to inc safety; 7) PT for ongoing patient and caregiver education   PT Treatment Day 0   Plan   Treatment/Interventions Functional transfer training;LE strengthening/ROM; Elevations; Therapeutic exercise; Endurance training;Patient/family training;Bed mobility;Gait training;Continued evaluation;Spoke to nursing;OT   PT Frequency 3-5x/wk   Recommendation   PT Discharge Recommendation Post acute rehabilitation services  (vs HHPT pending progress)   AM-PAC Basic Mobility Inpatient   Turning in Flat Bed Without Bedrails 2   Lying on Back to Sitting on Edge of Flat Bed Without Bedrails 2   Moving Bed to Chair 3   Standing Up From Chair Using Arms 3   Walk in Room 2   Climb 3-5 Stairs With Railing 2   Basic Mobility Inpatient Raw Score 14   Basic Mobility Standardized Score 35 55   Highest Level Of Mobility   -[facility] Goal 4: Move to chair/commode   -HL Achieved 5: Stand (1 or more minutes)   End of Consult   Patient Position at End of Consult Supine; All needs within reach   End of Consult Comments Pt in stable condition at end of session  RN notified     Hx/personal factors: co-morbidities, inaccessible home, dec caregiver support, mutliple lines, telemetry, use of AD, dec cognition, pain, fall risk, and assist w/ ADL's  Examination: dec mobility, dec balance, dec endurance, dec amb, risk for falls, pain, dec cognition, assessed body system, balance, endurance, amb, D/C disposition & fall risk, impairements in locomotion, musculoskeletal, balance, endurance, posture, coordination  Clinical: unpredictable (ongoing medical status, abnormal lab values, risk for falls, and pain mgt)  Complexity: high      Myra Pretty, PT

## 2023-03-24 NOTE — ED PROVIDER NOTES
History  Chief Complaint   Patient presents with   • Abdominal Pain     Pt reports abdominal pain with N/V/D since this am  No known sick contacts  Zofran administered by EMS   • Chest Pain     Pt reports chest pain, 4 morphine given by EMS     Patient is a 75-year-old female with a history of CVA, seizure-like activity, chronic pain disorder, memory loss, dyskinesia, bipolar disorder, generative disc disease with spinal cord stimulator placement in July 2020 coming in today with nausea vomiting diarrhea chest pain  Patient states that she did not feel right when she woke up this morning and has had vomiting and diarrhea all day  She has no recent travel, sick contacts or recent antibiotic use  She states that she started developing chest pain at the same time  EMS was called later tonight was given Zofran and morphine  The patient at this time just complains of pain all over especially points to epigastric and right upper quadrant pain  She has no shortness of breath, cough  She states that she cannot keep anything down  Review shows that patient does follow with neurology and is pending a repeat EEG and CT as she had these completed at [facility] several months ago  There was concern for slowing on the EEG for her seizures    Also noted she has a history of hypertension      History provided by:  Patient, medical records and EMS personnel   used: No    Vomiting  Severity:  Moderate  Timing:  Constant  Progression:  Worsening  Chronicity:  New  Context: not post-tussive and not self-induced    Relieved by:  None tried  Worsened by:  Nothing  Ineffective treatments:  None tried  Associated symptoms: abdominal pain, chills and diarrhea    Associated symptoms: no arthralgias, no cough, no fever, no headaches, no myalgias, no sore throat and no URI    Risk factors: no alcohol use, no diabetes, no suspect food intake and no travel to endemic areas            Prior to Admission Medications   Prescriptions Last Dose Informant Patient Reported? Taking? Diclofenac Sodium (VOLTAREN) 1 %   No No   Sig: Apply 2 g topically 4 (four) times a day   Ingrezza 40 MG CAPS   Yes No   Sig: TAKE 1 CAPSULE (40MG) BY MOUTH ONCE DAILY  LORazepam (ATIVAN) 0 5 mg tablet   No No   Sig: Take 1 tablet (0 5 mg total) by mouth 2 (two) times a day as needed for anxiety for up to 10 days   Multiple Vitamin (multivitamin) tablet   No No   Sig: Take 1 tablet by mouth daily   PARoxetine (PAXIL) 30 mg tablet   Yes No   Sig: Take 60 mg by mouth 2 (two) times a day   Takes two (60 mg) tabs by mouth nightly   Indications: Social Anxiety Disorder   QUEtiapine (SEROquel) 50 mg tablet   Yes No   Sig: TAKE 1 TAB (50 MG) BY MOUTH 3 TIMES A DAY  (9 AM, 4 PM, AND AT BEDTIME)   acetaminophen (Acetaminophen Extra Strength) 500 mg tablet   No No   Sig: Take 1 tablet (500 mg total) by mouth every 6 (six) hours as needed for mild pain   amLODIPine (NORVASC) 10 mg tablet   No No   Sig: TAKE 1 TABLET BY MOUTH EVERY DAY   ascorbic acid (VITAMIN C) 500 MG tablet   No No   Sig: Take 1 tablet (500 mg total) by mouth daily   aspirin (ECOTRIN LOW STRENGTH) 81 mg EC tablet   No No   Sig: Take 1 tablet (81 mg total) by mouth daily   atorvastatin (LIPITOR) 40 mg tablet   No No   Sig: TAKE 1 TABLET BY MOUTH EVERY DAY   benztropine (COGENTIN) 1 mg tablet   Yes No   Sig: Take 1 mg by mouth 2 (two) times a day   busPIRone (BUSPAR) 10 mg tablet   Yes No   Sig: Take 20 mg by mouth 3 (three) times a day    docusate sodium (COLACE) 100 mg capsule   No No   Sig: Take 1 capsule (100 mg total) by mouth 2 (two) times a day   ferrous sulfate 324 (65 Fe) mg   No No   Sig: Take 1 tablet (324 mg total) by mouth daily   folic acid (FOLVITE) 1 mg tablet   No No   Sig: TAKE 1 TABLET BY MOUTH EVERY DAY   hydrOXYzine HCL (ATARAX) 50 mg tablet   Yes No   Sig: Take 50 mg by mouth 3 (three) times a day as needed for itching   lithium carbonate (LITHOBID) 300 mg CR tablet   No No   Sig: TAKE 1 TABLET BY MOUTH DAILY AT BEDTIME   mirtazapine (REMERON) 7 5 MG tablet   No No   Sig: TAKE 1 TABLET BY MOUTH EVERY EVENING    naloxone (NARCAN) 4 mg/0 1 mL nasal spray   No No   Sig: Administer 1 spray into a nostril  If no response after 2-3 minutes, give another dose in the other nostril using a new spray  pantoprazole (PROTONIX) 20 mg tablet   Yes No   Sig: Take 20 mg by mouth daily   prazosin (MINIPRESS) 2 mg capsule   Yes No   Sig: Take 2 mg by mouth daily at bedtime  Indications: Frightening Dreams   pregabalin (LYRICA) 50 mg capsule   No No   Si capsule twice a day  May increase to 1 capsule 3 times per day if needed  Facility-Administered Medications: None       Past Medical History:   Diagnosis Date   • Anxiety    • Arthritis     left knee   • At risk for falls    • Bipolar 2 disorder (HCC)     FOLLOWS WITH PSYCHIATRIST  CONTINUE LAMOTRIGINE; RESOLVED: [id number]   • Depression    • Familial tremor     both hands   • Fibromyalgia     LAST ASSESSED: [id number]   • GERD (gastroesophageal reflux disease)    • Hearing aid worn     left ear   • [place] (hard of hearing)     left ear   • Hyperlipidemia    • Hypertension    • Left-sided weakness    • Memory loss of unknown cause     long and short term   • Migraine    • Obesity    • Obesity, Class II, BMI 35-39 9    • Overactive bladder    • Panic attack    • Post traumatic stress disorder    • S/P insertion of spinal cord stimulator     no remote   • Seasonal allergies    • Seizures (HCC)     possible seizure like activity   • Stroke [facility])     questionable stroke    • Tardive dyskinesia     PATIENT STATES   • 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    CONT ASA 81 MG DAILY; RESOLVED: [id number]   • Urinary incontinence    • Uses walker    • Wears dentures     partial lower / full upper- doesnt wear   • Wears glasses        Past Surgical History:   Procedure Laterality Date   • BACK SURGERY     •  SECTION  1986   • COLONOSCOPY      RESOLVED: [id number]   • EAR SURGERY     • EGD     • HYSTERECTOMY  2004   • MYRINGOTOMY W/ TUBES Left    • NECK SURGERY  04/2019   • UT ARTHRP KNE CONDYLE&PLATU MEDIAL&LAT COMPARTMENTS Right 3/9/2022    Procedure: ARTHROPLASTY KNEE TOTAL;  Surgeon: Smith Vargas DO;  Location: [facility];  Service: Orthopedics   • UT ARTHRP KNE CONDYLE&PLATU MEDIAL&LAT COMPARTMENTS Left 7/5/2022    Procedure: ARTHROPLASTY KNEE TOTAL;  Surgeon: Smith Vargas DO;  Location[facility];  Service: Orthopedics   • UT CYSTOURETHROSCOPY N/A 2/18/2016    Procedure: CYSTOSCOPY, BOTOX INJECTION;  Surgeon: Alma Jennings MD;  Location: [facility];  Service: Gynecology   • UT INSJ/RPLCMT SPI NPGR DIR/INDUXIVE COUPLING Right 2/10/2021    Procedure: REPLACEMENT IMPLANTABLE PULSE GENERATOR DORSAL SPINAL COLUMN STIMULATOR, RIGHT;  Surgeon: Heather Dodd MD;  Location: [facility];  Service: Neurosurgery   • UT PRQ IMPLTJ NSTIM ELECTRODE ARRAY EPIDURAL Right 7/28/2020    Procedure: INSERTION THORACIC DORSAL COLUMN SPINAL CORD STIMULATOR PERCUTANEOUS W IMPLANTABLE PULSE GENERATOR, RIGHT;  Surgeon: Heather Dodd MD;  Location: [facility];  Service: Neurosurgery   • TONSILLECTOMY     • TUBAL LIGATION  1986   • UPPER GASTROINTESTINAL ENDOSCOPY  09/2020       Family History   Problem Relation Age of Onset   • Colon cancer Mother    • Alzheimer's disease Father    • Stroke Father    • Colon cancer Brother    • Bipolar disorder Brother    • Breast cancer Maternal Aunt    • Colon cancer Maternal Aunt    • Heart disease Other    • Diabetes Other    • Hypertension Other    • Seizures Son    • Depression Paternal Grandfather    • No Known Problems Sister    • No Known Problems Brother    • Thyroid disease Neg Hx      I have reviewed and agree with the history as documented      E-Cigarette/Vaping   • E-Cigarette Use Never User      E-Cigarette/Vaping Substances   • Nicotine No    • THC No    • CBD No    • Flavoring No    • Other No    • Unknown No      Social History     Tobacco Use   • Smoking status: Never   • Smokeless tobacco: Never   Vaping Use   • Vaping Use: Never used   Substance Use Topics   • Alcohol use: Not Currently     Comment: 2 x year; being a social drinker as per all scripts    • Drug use: No       Review of Systems   Constitutional: Positive for chills  Negative for fever  HENT: Negative  Negative for ear pain and sore throat  Eyes: Negative for pain and visual disturbance  Respiratory: Negative  Negative for cough and shortness of breath  Cardiovascular: Negative for chest pain and palpitations  Gastrointestinal: Positive for abdominal pain, diarrhea and vomiting  Genitourinary: Negative  Negative for dysuria and hematuria  Musculoskeletal: Negative  Negative for arthralgias, back pain and myalgias  Skin: Negative  Negative for color change and rash  Neurological: Negative  Negative for seizures, syncope and headaches  Hematological: Negative  Psychiatric/Behavioral: Negative  All other systems reviewed and are negative  Physical Exam  Physical Exam  Vitals and nursing note reviewed  Constitutional:       General: She is not in acute distress  Appearance: She is well-developed  HENT:      Head: Normocephalic and atraumatic  Comments: Patient maintaining airway and secretions  No stridor   No brawniness under tongue  Eyes:      Extraocular Movements: Extraocular movements intact  Conjunctiva/sclera: Conjunctivae normal       Pupils: Pupils are equal, round, and reactive to light  Cardiovascular:      Rate and Rhythm: Normal rate and regular rhythm  Heart sounds: No murmur heard  Pulmonary:      Effort: Pulmonary effort is normal  No respiratory distress  Breath sounds: Normal breath sounds  Abdominal:      General: Abdomen is protuberant  Bowel sounds are normal       Palpations: Abdomen is soft  Tenderness:  There is abdominal tenderness in the right upper quadrant and epigastric area  There is guarding  There is no right CVA tenderness, left CVA tenderness or rebound  Musculoskeletal:         General: No swelling  Cervical back: Neck supple  Skin:     General: Skin is warm and dry  Capillary Refill: Capillary refill takes less than 2 seconds  Neurological:      General: No focal deficit present  Mental Status: She is alert and oriented to person, place, and time  GCS: GCS eye subscore is 4  GCS verbal subscore is 5  GCS motor subscore is 6  Cranial Nerves: Cranial nerves 2-12 are intact  Sensory: Sensation is intact  Motor: Motor function is intact  Comments: Patient has symptoms consistent with tardive dyskinesia with lip smacking and tongue thrusting  No facial asymmetry       Psychiatric:         Attention and Perception: Attention and perception normal          Mood and Affect: Mood normal          Vital Signs  ED Triage Vitals [03/23/23 2143]   Temperature Pulse Respirations Blood Pressure SpO2   98 8 °F (37 1 °C) (!) 109 20 121/97 100 %      Temp Source Heart Rate Source Patient Position - Orthostatic VS BP Location FiO2 (%)   Oral Monitor Lying Right arm --      Pain Score       10 - Worst Possible Pain           Vitals:    03/23/23 2143 03/23/23 2149 03/24/23 0000   BP: 121/97  129/70   Pulse: (!) 109 101 100   Patient Position - Orthostatic VS: Lying  Lying         Visual Acuity      ED Medications  Medications   magnesium sulfate 2 g/50 mL IVPB (premix) 2 g (2 g Intravenous New Bag 3/24/23 0113)   sodium chloride 0 9 % infusion (150 mL/hr Intravenous New Bag 3/24/23 0113)   potassium chloride 20 mEq IVPB (premix) (has no administration in time range)   morphine (PF) (FOR EMS ONLY) 4 mg/mL injection 4 mg (0 mg Does not apply Given to EMS 3/23/23 2139)   ondansetron (FOR EMS ONLY) (ZOFRAN) 4 mg/2 mL injection 4 mg (0 mg Does not apply Given to EMS 3/23/23 2139)   sodium chloride 0 9 % bolus 1,000 mL (0 mL Intravenous Stopped 3/23/23 2359)   LORazepam (ATIVAN) injection 1 mg (1 mg Intravenous Given 3/23/23 2303)   sodium chloride 0 9 % bolus 1,000 mL (0 mL Intravenous Stopped 3/23/23 2358)   iohexol (OMNIPAQUE) 350 MG/ML injection (SINGLE-DOSE) 100 mL (100 mL Intravenous Given 3/23/23 2250)   fentanyl citrate (PF) 100 MCG/2ML 50 mcg (50 mcg Intravenous Given 3/23/23 2303)   fentanyl citrate (PF) 100 MCG/2ML 50 mcg (50 mcg Intravenous Given 3/24/23 0113)       Diagnostic Studies  Results Reviewed     Procedure Component Value Units Date/Time    Lactic acid 2 Hours [[id number]]  (Normal) Collected: 03/24/23 0055    Lab Status: Final result Specimen: Blood from Arm, Left Updated: 03/24/23 0122     LACTIC ACID 1 9 mmol/L     Narrative:      Result may be elevated if tourniquet was used during collection      HS Troponin I 2hr [[id number]]  (Normal) Collected: 03/24/23 0010    Lab Status: Final result Specimen: Blood from Arm, Left Updated: 03/24/23 0053     hs TnI 2hr 10 ng/L      Delta 2hr hsTnI 6 ng/L     Urine Microscopic [[id number]]  (Abnormal) Collected: 03/24/23 0014    Lab Status: Final result Specimen: Urine, Clean Catch Updated: 03/24/23 0032     RBC, UA 0-1 /hpf      WBC, UA 0-5 /hpf      Epithelial Cells Occasional /hpf      Bacteria, UA Occasional /hpf     Urine Macroscopic, POC [[id number]]  (Abnormal) Collected: 03/24/23 0014    Lab Status: Final result Specimen: Urine Updated: 03/24/23 0015     Color, UA Yellow     Clarity, UA Clear     pH, UA 7 5     Leukocytes, UA Small     Nitrite, UA Negative     Protein, UA Negative mg/dl      Glucose, UA Negative mg/dl      Ketones, UA Negative mg/dl      Urobilinogen, UA 0 2 E U /dl      Bilirubin, UA Negative     Occult Blood, UA Trace     Specific [place], UA 1 015    Narrative:      CLINITEK RESULT    FLU/RSV/COVID - if FLU/RSV clinically relevant [[id number]]  (Normal) Collected: 03/23/23 2302    Lab Status: Final result Specimen: Nares from Nose Updated: 03/24/23 0008     SARS-CoV-2 Negative     INFLUENZA A PCR Negative     INFLUENZA B PCR Negative     RSV PCR Negative    Narrative:      FOR PEDIATRIC PATIENTS - copy/paste COVID Guidelines URL to browser: [email or web address]    SARS-CoV-2 assay is a Nucleic Acid Amplification assay intended for the  qualitative detection of nucleic acid from SARS-CoV-2 in nasopharyngeal  swabs  Results are for the presumptive identification of SARS-CoV-2 RNA  Positive results are indicative of infection with SARS-CoV-2, the virus  causing COVID-19, but do not rule out bacterial infection or co-infection  with other viruses  Laboratories within the [place] and its  territories are required to report all positive results to the appropriate  public health authorities  Negative results do not preclude SARS-CoV-2  infection and should not be used as the sole basis for treatment or other  patient management decisions  Negative results must be combined with  clinical observations, patient history, and epidemiological information  This test has not been FDA cleared or approved  This test has been authorized by FDA under an Emergency Use Authorization  (EUA)  This test is only authorized for the duration of time the  declaration that circumstances exist justifying the authorization of the  emergency use of an in vitro diagnostic tests for detection of SARS-CoV-2  virus and/or diagnosis of COVID-19 infection under section 564(b)(1) of  the Act, 21 U  S C  147ICW-5(U)(6), unless the authorization is terminated  or revoked sooner  The test has been validated but independent review by FDA  and CLIA is pending  Test performed using [employer] Gene[employer]pert: This RT-PCR assay targets N2,  a region unique to SARS-CoV-2  A conserved region in the E-gene was chosen  for pan-Sarbecovirus detection which includes SARS-CoV-2      According to CMS-2020-01-R, this platform meets the definition of high-[employer] technology  HS Troponin I 4hr [[id number]]     Lab Status: No result Specimen: Blood     Lithium level [[id number]]  (Abnormal) Collected: 03/23/23 2302    Lab Status: Final result Specimen: Blood from Arm, Left Updated: 03/23/23 2352     Lithium Lvl <0 1 mmol/L     Manual Differential(PHLEBS Do Not Order) [[id number]]  (Abnormal) Collected: 03/23/23 2157    Lab Status: Final result Specimen: Blood from Arm, Left Updated: 03/23/23 2253     Segmented % 85 %      Bands % 1 %      Lymphocytes % 11 %      Monocytes % 3 %      Eosinophils, % 0 %      Basophils % 0 %      Absolute Neutrophils 8 97 Thousand/uL      Lymphocytes Absolute 1 15 Thousand/uL      Monocytes Absolute 0 31 Thousand/uL      Eosinophils Absolute 0 00 Thousand/uL      Basophils Absolute 0 00 Thousand/uL      Total Counted --     RBC Morphology Normal     Platelet Estimate Increased    Lactic acid [[id number]]  (Abnormal) Collected: 03/23/23 2157    Lab Status: Final result Specimen: Blood from Arm, Left Updated: 03/23/23 2239     LACTIC ACID 5 2 mmol/L     Narrative:      Result may be elevated if tourniquet was used during collection      HS Troponin 0hr (reflex protocol) [[id number]]  (Normal) Collected: 03/23/23 2157    Lab Status: Final result Specimen: Blood from Arm, Left Updated: 03/23/23 2230     hs TnI 0hr 4 ng/L     B-Type Natriuretic Peptide(BNP) [[id number]]  (Normal) Collected: 03/23/23 2157    Lab Status: Final result Specimen: Blood from Arm, Left Updated: 03/23/23 2229     BNP 16 pg/mL     Comprehensive metabolic panel [[id number]]  (Abnormal) Collected: 03/23/23 2157    Lab Status: Final result Specimen: Blood from Arm, Left Updated: 03/23/23 2223     Sodium 140 mmol/L      Potassium 3 3 mmol/L      Chloride 99 mmol/L      CO2 21 mmol/L      ANION GAP 20 mmol/L      BUN 13 mg/dL      Creatinine 1 18 mg/dL      Glucose 233 mg/dL      Calcium 10 3 mg/dL      AST 16 U/L      ALT 13 U/L Alkaline Phosphatase 100 U/L      Total Protein 8 1 g/dL      Albumin 5 0 g/dL      Total Bilirubin 2 05 mg/dL      eGFR 50 ml/min/1 73sq m     Narrative:      [place] guidelines for Chronic Kidney Disease (CKD):   •  Stage 1 with normal or high GFR (GFR > 90 mL/min/1 73 square meters)  •  Stage 2 Mild CKD (GFR = 60-89 mL/min/1 73 square meters)  •  Stage 3A Moderate CKD (GFR = 45-59 mL/min/1 73 square meters)  •  Stage 3B Moderate CKD (GFR = 30-44 mL/min/1 73 square meters)  •  Stage 4 Severe CKD (GFR = 15-29 mL/min/1 73 square meters)  •  Stage 5 End Stage CKD (GFR <15 mL/min/1 73 square meters)  Note: GFR calculation is accurate only with a steady state creatinine    Magnesium [[id number]]  (Normal) Collected: 03/23/23 2157    Lab Status: Final result Specimen: Blood from Arm, Left Updated: 03/23/23 2223     Magnesium 1 9 mg/dL     Lipase [[id number]]  (Abnormal) Collected: 03/23/23 2157    Lab Status: Final result Specimen: Blood from Arm, Left Updated: 03/23/23 2223     Lipase 7 u/L     Protime-INR [[id number]]  (Normal) Collected: 03/23/23 2157    Lab Status: Final result Specimen: Blood from Arm, Left Updated: 03/23/23 2217     Protime 12 5 seconds      INR 0 93    APTT [[id number]]  (Normal) Collected: 03/23/23 2157    Lab Status: Final result Specimen: Blood from Arm, Left Updated: 03/23/23 2217     PTT 30 seconds     CBC and differential [[id number]]  (Abnormal) Collected: 03/23/23 2157    Lab Status: Final result Specimen: Blood from Arm, Left Updated: 03/23/23 2203     WBC 10 43 Thousand/uL      RBC 5 65 Million/uL      Hemoglobin 16 6 g/dL      Hematocrit 49 3 %      MCV 87 fL      MCH 29 4 pg      MCHC 33 7 g/dL      RDW 14 8 %      MPV 8 9 fL      Platelets 872 Thousands/uL                  CT chest abdomen pelvis w contrast    (Results Pending)              Procedures  Procedures         ED Course  ED Course as of 03/24/23 0135   Thu Mar 23, 2023   2216 Patient is a 59year old "female coming in today complaining of abdominal pain, chest pain, nausea vomiting and diarrhea  On exam patient keeps complaining of pain  He does have diffuse abdominal tenderness in the epigastric right upper quadrant with guarding but no rebound  CBC shows hemoconcentration with mild leukocytosis  We will continue work-up with CBC CMP Trope lipase lactic and CT chest and pelvis  Disclosure: Voice to text software was used in the preparation of this document and could have resulted in translational errors       Occasional wrong word or \"sound a like\" substitutions may have occurred due to the inherent limitations of voice recognition software   Read the chart carefully and recognize, using context, where substitutions have occurred         2241 Patient does have hemoconcentration with mild leukocytosis without bands  She does have a lactic acidosis of 5  Will give 2 L IV fluids as noted there is an anion gap with a glucose of 233  Continue with pain control and IV fluids   Fri Mar 24, 2023   2491 Patient resting in bed and is more comfortable then before however remains in pain  Patient's godaughter at bedside  Answered questions as well  Will repeat fentanyl as well as continue ivf    0109 Long discussion with patient and family  Patient had 2 episodes of diarrhea however resolved  She is continuing to have pain  They are agreeable for admission  Talk to Dr Jackie Vaca -from general surgery and wishes for n p o , antiemetics, pain control, aspiration precautions serial labs and replace potassium to 4 magnesium to 2 as well as potential NG tube if persistent symptoms and surgery eval   0124 Lactic acid improved  Urine clear  Delta troponin of 6  We will continue to monitor trend troponin   0129 Discussed with Bishop Burgos - admit to Dr Leija Sessions  We had a detailed discussion of the patient's condition and case,  including need for admission  Accepts to his/her service  Bed request/bridging orders placed    " HEART Risk Score    Flowsheet Row Most Recent Value   Heart Score Risk Calculator    History 0 Filed at: 03/23/2023 2319   ECG 1 Filed at: 03/23/2023 2319   Age 1 Filed at: 03/23/2023 2319   Risk Factors 2 Filed at: 03/23/2023 2319   Troponin 0 Filed at: 03/23/2023 2319   HEART Score 4 Filed at: 03/23/2023 2319                        SBIRT 20yo+    Flowsheet Row Most Recent Value   SBIRT (25 yo +)    In order to provide better care to our patients, we are screening all of our patients for alcohol and drug use  Would it be okay to ask you these screening questions? No Filed at: 03/23/2023 2151                    Medical Decision Making      EKG INTERPRETATION @ 2146  RHYTHM: sinus tachycardia at 100 bpm  AXIS: RAD  INTERVALS: DE @ 124 ms  QRS COMPLEX: QRS @ 92 ms  ST SEGMENT: non specific st segment changes  Diffuse artifact  QT INTERVAL: QTC @ 496 ms  COMPARED WITH PRIOR   Renaee Majestic Interpretation by Marycarmen Liliana, DO    EKG INTERPRETATION @ 2359  RHYTHM: Sinus tachycardia at 100 bpm  AXIS: Normal axis  INTERVALS: DE interval measured 166 ms  QRS COMPLEX: QRS measured at 84 ms  ST SEGMENT: Nonspecific ST segment changes  Diffuse artifact    Occasional PVCs  QT INTERVAL: QTc measured at 5 5 4 ms  COMPARED WITH PRIOR compared to old EKG this prolonged QT is new  Interpretation by Marycarmen Liliana, DO    Differential diagnosis includes but not limited to:  Appendicitis, viral syndrome, constipation, AMI, NSTEMI, pneumonia, pneuothorax, gerd, gastritis,  mesenteric ischemia, mesenteric adenitis, pancreatitis, cholecystitis, choledocholithiasis, hepatitis, bowel obstruction, ileus, gastroenteritis, colitis, malignancy, AAA, perforation, toxicologic poisoning, renal infarct, acute kidney injury, splenic infarct, splenic injury, nephrolithiasis, UTI, muscular strain, intra-abdominal hematoma, hernia,     Abdominal pain: acute illness or injury  Chest pain: acute illness or injury  Lactic acidosis: acute illness or "injury  Nausea & vomiting: acute illness or injury  Amount and/or Complexity of Data Reviewed  External Data Reviewed: radiology, ECG and notes  Details: Reviewed neurology note as well as family doctor note  Labs: ordered  Decision-making details documented in ED Course  Details: Hemoconcentration  Anion gap acidosis due to lactic acidosis from persistent vomiting diarrhea  Radiology: ordered  Decision-making details documented in ED Course  Details: VRAD -\" moderate sliding hiatal hernia; gastroesophageal reflux; findings concerning for distal small bowel obstruction; ascites possibly related to peritonitis and post hysterectomy  ECG/medicine tests: ordered and independent interpretation performed  Decision-making details documented in ED Course  Details: No arrhythmia  No ischemia      Risk  Prescription drug management  Disposition  Final diagnoses:   Abdominal pain   Nausea & vomiting   Chest pain   Lactic acidosis   Hiatal hernia   Hypokalemia     Time reflects when diagnosis was documented in both MDM as applicable and the Disposition within this note     Time User Action Codes Description Comment    3/23/2023 11:50 PM Bendock, Jermaine La Luisa L Add [R10 9] Abdominal pain     3/23/2023 11:50 PM Bendock, Nedra L Add [R11 2] Nausea & vomiting     3/23/2023 11:50 PM Bendock, Nedra L Add [R07 9] Chest pain     3/23/2023 11:50 PM Bendock, Nedra L Add [E87 20] Lactic acidosis     3/24/2023 12:28 AM Bendock, Nedra L Add [K44 9] Hiatal hernia     3/24/2023  1:22 AM Bendock, Nedra L Add [E87 6] Hypokalemia       ED Disposition     ED Disposition   Admit    Condition   Stable    Date/Time   Fri Mar 24, 2023  1:30 AM    Comment   Case was discussed with Felicia Walsh and the patient's admission status was agreed to be Admission Status: inpatient status to the service of Dr Nelson Barr              Follow-up Information    None         Patient's Medications   Discharge Prescriptions    No medications on file " No discharge procedures on file      PDMP Review       Value Time User    PDMP Reviewed  Yes 3/9/2023  8:45 AM [facility]          ED Provider  Electronically Signed by           Talon Hernandez DO  03/24/23 0960

## 2023-03-24 NOTE — ASSESSMENT & PLAN NOTE
· Patient not great historian   · Followed outpatient by neurology for this issue   · Scheduled to follow-up outpatient with neuropsych after ENT hearing evaluation completed

## 2023-03-24 NOTE — PLAN OF CARE
Problem: OCCUPATIONAL THERAPY ADULT  Goal: Performs self-care activities at highest level of function for planned discharge setting  See evaluation for individualized goals  Description: Treatment Interventions: ADL retraining, Functional transfer training, UE strengthening/ROM, Endurance training, Cognitive reorientation, Patient/family training, Compensatory technique education, Continued evaluation          See flowsheet documentation for full assessment, interventions and recommendations  Note: Limitation: Decreased ADL status, Decreased UE strength, Decreased Safe judgement during ADL, Decreased cognition, Decreased endurance, Decreased high-level ADLs  Prognosis: Fair  Assessment: Pt is a 55y/o female admitted to the hospital 2* symptoms of nausea, vomiting, diarrhea, and chest pain  Pt noted with possible small bowel obstruction  Pt with PMH tardive dyskinesia, bipolar, HTN, cognitive deficits, fibromyalgia, CVA, spinal cord stimulator, b/l TKR, back sx, panic attacks  PTA pt states that she had assistance with her ADLs; states independence with transfers/ambulation--with RW; pt is a limited historian  During initial eval, pt demonstrated deficits with her functional balance, functional mobility, ADL status, transfer safety, b/l UE strength, activity tolerance(currently fair=15-20mins), and cognition(i e orientation, memory, problem-solving)  Pt would benefit from continued OT assessment and tx for the above deficits  2-3xwk/1-2wks  The patient's raw score on the AM-PAC Daily Activity Inpatient Short Form is 18  A raw score of less than 19 suggests the patient may benefit from discharge to post-acute rehabilitation services  Please refer to the recommendation of the Occupational Therapist for safe discharge planning       OT Discharge Recommendation:  (str vs home pending progress)

## 2023-03-24 NOTE — PLAN OF CARE
"  Problem: PHYSICAL THERAPY ADULT  Goal: Performs mobility at highest level of function for planned discharge setting  See evaluation for individualized goals  Description: Treatment/Interventions: Functional transfer training, LE strengthening/ROM, Elevations, Therapeutic exercise, Endurance training, Patient/family training, Bed mobility, Gait training, Continued evaluation, Spoke to nursing, OT          See flowsheet documentation for full assessment, interventions and recommendations  Note: Prognosis: Guarded  Problem List: Decreased strength, Decreased endurance, Impaired balance, Decreased mobility, Decreased cognition, Impaired judgement, Decreased safety awareness, Pain  Assessment: Pt  59 y  o female presents with abdominal pain with N/V/D and chest pain  Past medical hx includes CVA, seizure-like activity, chronic pain disorder, memory loss, dyskinesia, bipolar disorder, generative disc disease with spinal cord stimulator placement in July 2020  Per chart, CT abdomen/pelvis demonstrating concern for distal small bowel obstruction  Pt admitted for Small bowel obstruction ([facility] [address] ) w/ Chest pain (R07 9)  Pt referred to PT for functional mobility evaluation & D/C planning w/ orders of activity as tolerated  PTA, pt reports being (A) w/ ADL's, (A) w/ amb and use of RW  Pt poor historian with difficulty attending to tasks due to consistently saying \"I'm sick  \" Pain reported 7/10 in buttock  Pt reported chest pain and dizziness, BP /95  During evaluation, deficits included dec mobility, balance, ambulation  Please see flow sheet above for objective findings and level of assistance required for safe completion of functional tasks  Required minAx1 for bed mobility, transfers, and ambulation  Pt able to side step 3 steps towards [facility] with use of RW and minAx1  Cues provided for RW management and safety  Pt demonstrated dec endurance and tolerance to activity   Pt was educated on fall precautions and reinforced " w/ good understanding  Pt would benefit from continued PT to address deficits as defined above and maximize level of independence with functional mobility and safety  The patient's AM-PAC Basic Mobility Inpatient Short Form Raw Score is 14  A Raw score of less than or equal to 16 suggests the patient may benefit from discharge to post-acute rehabilitation services  Please also refer to the recommendation of the Physical Therapist for safe discharge planning  From PT/mobility standpoint recommendation for D/C to *STR vs HHPT pending progress, when medically cleared based on objective measures above, current function, and dec cognition  CM to follow  Nsg staff to continue to mobilized pt (OOB in chair for all meals & ambulate in room/unit) as tolerated to prevent further decline in function  Nsg notified  Co-eval performed to complete this PT evaluation for the pts best interest given pts medical complexity and functional level  PT Discharge Recommendation: Post acute rehabilitation services (vs HHPT pending progress)    See flowsheet documentation for full assessment

## 2023-03-24 NOTE — ED NOTES
Pt noted to have vomited, linens changed and pt freshened  Upon repositioning, pt again vomited into emesis bag       Serge Santizo RN  03/24/23 1394

## 2023-03-24 NOTE — ASSESSMENT & PLAN NOTE
· Patient mood is stable with tardive dyskinesia at baseline  · Continue home medication regimen  · 50 mg seroquel t i d  · 300 mg lithium h s    · 30 mg paxil b i d  · 60 mg paxil h s   · 20 mg buspar t i d  · 40 mg ingrezza daily  · 7 5 mg remeron h s   · 50 mg hydroxyzine b i d prn

## 2023-03-24 NOTE — PROGRESS NOTES
Patient received from emergency room crying yelling I need water  She is aware npo sips meds  Crying her mouth and jaw rapid movement Tardive dyskinesia  Bed alarm placed unable to answer admission questions called daughter no answer  Iv fluids started   Call bell in reach

## 2023-03-24 NOTE — ASSESSMENT & PLAN NOTE
· Patient presented to the ED following multiple episodes of vomiting at home x one day  She reports two episodes of diarrhea at home, now resolved  Patient reports since getting treatment in the ED, she has only vomited twice     · CT abdomen/pelvis demonstrating concern for distal small bowel obstruction  · Lactic acid 5 2-->1 9 following 2L fluids in ED  · Lipase <7  · Viral panel negative  · WBC 10 43, afebrile  · Surgery consulted while in ED, appreciate recommendations  · IV fluids  · IV antiemetics  · NPO for now   · Would like to avoid NG tube if possible given tardive dyskinesia  · Will continue to follow

## 2023-03-24 NOTE — OCCUPATIONAL THERAPY NOTE
Occupational Therapy Evaluation(time=9320-0193)     Patient Name: Ángel HERNANDES Date: 3/24/2023  Problem List  Principal Problem:    Small bowel obstruction (HCC)  Active Problems:    Chronic pain disorder    Bipolar II disorder ([address] )    Essential hypertension    Tardive dyskinesia    Mixed hyperlipidemia    Memory loss    Past Medical History  Past Medical History:   Diagnosis Date    Anxiety     Arthritis     left knee    At risk for falls     Bipolar 2 disorder (HCC)     FOLLOWS WITH PSYCHIATRIST  CONTINUE LAMOTRIGINE; RESOLVED:     Depression     Familial tremor     both hands    Fibromyalgia     LAST ASSESSED:     GERD (gastroesophageal reflux disease)     Hearing aid worn     left ear    [place] (hard of hearing)     left ear    Hyperlipidemia     Hypertension     Left-sided weakness     Memory loss of unknown cause     long and short term    Migraine     Obesity     Obesity, Class II, BMI 35-39 9     Overactive bladder     Panic attack     Post traumatic stress disorder     S/P insertion of spinal cord stimulator     no remote    Seasonal allergies     Seizures ([facility] [address] )     possible seizure like activity    Stroke [facility])     questionable stroke 2009    Tardive dyskinesia     PATIENT STATES    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    CONT ASA 81 MG DAILY; RESOLVED: [id number]    Urinary incontinence     Uses walker     Wears dentures     partial lower / full upper- doesnt wear    Wears glasses      Past Surgical History  Past Surgical History:   Procedure Laterality Date    BACK SURGERY       SECTION      COLONOSCOPY      RESOLVED: [id number]    EAR SURGERY      EGD      HYSTERECTOMY  2004    MYRINGOTOMY W/ TUBES Left     NECK SURGERY  2019    JANIA PINEDA CONDYLE&PLATU MEDIAL&LAT COMPARTMENTS Right 3/9/2022    Procedure: ARTHROPLASTY KNEE TOTAL;  Surgeon: Saint Ester, DO;  [facility]: [facility];  Service: Orthopedics    JANIA PINEDA CONDYLE&PLATU MEDIAL&LAT COMPARTMENTS Left 7/5/2022    Procedure: ARTHROPLASTY KNEE TOTAL;  Surgeon: Shahrzad Hernandez DO;  Location: [facility];  Service: Orthopedics    VA CYSTOURETHROSCOPY N/A 2/18/2016    Procedure: CYSTOSCOPY, BOTOX INJECTION;  Surgeon: Aidee Norris MD;  Location: [facility];  Service: Gynecology    VA INSJ/RPLCMT SPI NPGR DIR/INDUXIVE COUPLING Right 2/10/2021    Procedure: REPLACEMENT IMPLANTABLE PULSE GENERATOR DORSAL SPINAL COLUMN STIMULATOR, RIGHT;  Surgeon: Gearldean Severs, MD;  [facility]: [facility];  Service: Neurosurgery    VA PRQ [address] NSTIM ELECTRODE ARRAY EPIDURAL Right 7/28/2020    Procedure: INSERTION THORACIC DORSAL COLUMN SPINAL CORD STIMULATOR PERCUTANEOUS W IMPLANTABLE PULSE GENERATOR, RIGHT;  Surgeon: Gearldean Severs, MD;  [facility];  Service: Neurosurgery    TONSILLECTOMY      TUBAL LIGATION  1986    UPPER GASTROINTESTINAL ENDOSCOPY  09/2020 03/24/23 1438   Note Type   Note type Evaluation   Pain Assessment   Pain Assessment Tool 0-10   Pain Score 7   Pain Location/Orientation Location: Buttocks;Orientation: Lower; Location: Back   Restrictions/Precautions   Weight Bearing Precautions Per Order No   Other Precautions Cognitive;Multiple lines;Telemetry; Fall Risk;Pain   Home Living   Type of Home House  (pt states same social setup from prior admission's notes(9/22))   Home Layout Multi-level   Bathroom Shower/Tub Tub/shower unit   [place]; Wheelchair-manual   Prior Function   Lives With Family;Daughter  (CLARENCE, grandchildren)   Falls in the last 6 months   (pt denies)   Comments PTA pt states that she had assistance with her ADLs; states independence with transfers/ambulation--with RW; pt is a limited historian   Lifestyle   Autonomy PTA pt states that she had assistance with her ADLs; states independence with transfers/ambulation--with RW   Reciprocal Relationships supportive family   Service to Others homemaker "  Intrinsic Gratification watching TV   Subjective   Subjective \"I'm sick  \"   ADL   Where Assessed Edge of bed   Eating Assistance 5  Supervision/Setup   Grooming Assistance 5  Supervision/Setup   UB Bathing Assistance 4  Minimal Assistance   LB Bathing Assistance 3  Moderate Assistance   UB Dressing Assistance 4  Minimal [address] 3  Moderate Assistance   Toileting Assistance  3  Moderate Assistance   Bed Mobility   Rolling R 4  Minimal assistance   Additional items Assist x 1; Increased time required;Verbal cues;LE management   Rolling L 4  Minimal assistance   Additional items Assist x 1; Increased time required;Verbal cues;LE management   Supine to Sit 4  Minimal assistance   Additional items Assist x 1; Increased time required;Verbal cues;LE management   Sit to Supine 4  Minimal assistance   Additional items Assist x 1; Increased time required;Verbal cues;LE management   Transfers   Sit to Stand 4  Minimal assistance   Additional items Assist x 1; Increased time required;Verbal cues   Stand to Sit 4  Minimal assistance   Additional items Assist x 1; Increased time required;Verbal cues   Additional Comments bp's=132/95(EOB)   Functional Mobility   Functional Mobility 4  Minimal assistance   Additional Comments x1   Balance   Static Sitting Fair +   Dynamic Sitting Fair   Static Standing Fair -   Dynamic Standing Poor +   Activity Tolerance   Activity Tolerance Patient limited by fatigue   RUE Assessment   RUE Assessment WFL   RUE Strength   RUE Overall Strength Within Functional Limits - able to perform ADL tasks with strength  (3+/5 throughout; limited pt effort 2* discomfort)   LUE Assessment   LUE Assessment WFL   LUE Strength   LUE Overall Strength Within Functional Limits - able to perform ADL tasks with strength  (3+/5 throughout-limited pt effort 2* discomfort)   Hand Function   Gross Motor Coordination Functional   Fine Motor Coordination Functional   Sensation   Light Touch No " apparent deficits   Proprioception   Proprioception No apparent deficits   Vision-Basic Assessment   Current Vision   (glasses)   Vision - Complex Assessment   Acuity Able to read clock/calendar on wall without difficulty   Psychosocial   Psychosocial (WDL) X   Patient Behaviors/Mood Anxious; Cooperative   Perception   Inattention/Neglect Appears intact   Cognition   Overall Cognitive Status Impaired   Arousal/Participation Alert   Attention Attends with cues to redirect   Orientation Level Oriented to person;Oriented to time;Oriented to situation;Disoriented to place   Memory Decreased short term memory;Decreased recall of recent events;Decreased recall of precautions   Following Commands Follows one step commands with increased time or repetition   Comments hx memory loss   Assessment   Limitation Decreased ADL status; Decreased UE strength;Decreased Safe judgement during ADL;Decreased cognition;Decreased endurance;Decreased high-level ADLs   Prognosis Fair   Assessment Pt is a 55y/o female admitted to the hospital 2* symptoms of nausea, vomiting, diarrhea, and chest pain  Pt noted with possible small bowel obstruction  Pt with PMH tardive dyskinesia, bipolar, HTN, cognitive deficits, fibromyalgia, CVA, spinal cord stimulator, b/l TKR, back sx, panic attacks  PTA pt states that she had assistance with her ADLs; states independence with transfers/ambulation--with RW; pt is a limited historian  During initial eval, pt demonstrated deficits with her functional balance, functional mobility, ADL status, transfer safety, b/l UE strength, activity tolerance(currently fair=15-20mins), and cognition(i e orientation, memory, problem-solving)  Pt would benefit from continued OT assessment and tx for the above deficits  2-3xwk/1-2wks  The patient's raw score on the AM-PAC Daily Activity Inpatient Short Form is 18  A raw score of less than 19 suggests the patient may benefit from discharge to post-acute rehabilitation services  "Please refer to the recommendation of the Occupational Therapist for safe discharge planning  Goals   Patient Goals \"to feel better\"   STG Time Frame   (1-7 days)   Short Term Goal #1 Pt will demonstrate improved activity tolerance to good(20-30mins) and standing tolerance to 3-5mins to assist with ADLs  Short Term Goal #2 Pt will demonstrate proper walker/transfer safety 100% of the time  Short Term Goal  Pt will tolerate continued cognitive/home-safety assessment and appropriate d/c recommendations will be provided  LTG Time Frame   (7-14 days)   Long Term Goal #1 Pt will demonstrate g/g- balance with all functional activities  Long Term Goal #2 Pt will demonstrate mod I with their UE and LE bathing/dresssing  Long Term Goal Pt will demonstrate improved b/l UE strength by 1/2 MM grade to assist with ADLs/transfers  Plan   Treatment Interventions ADL retraining;Functional transfer training;UE strengthening/ROM; Endurance training;Cognitive reorientation;Patient/family training; Compensatory technique education;Continued evaluation   Goal Expiration Date 04/07/23   Recommendation   OT Discharge Recommendation   (str vs home pending progress)   AM-PAC Daily Activity Inpatient   Lower Body Dressing 2   Bathing 2   Toileting 3   Upper Body Dressing 3   Grooming 4   Eating 4   Daily Activity Raw Score 18   Daily Activity Standardized Score (Calc for Raw Score >=11) 38 66   AM-PAC Applied Cognition Inpatient   Following a Speech/Presentation 3   Understanding Ordinary Conversation 3   Taking Medications 2   Remembering Where Things Are Placed or Put Away 2   Remembering List of 4-5 Errands 2   Taking Care of Complicated Tasks 2   Applied Cognition Raw Score 14   Applied Cognition Standardized Score 32 02   Bryanna Valiente         "

## 2023-03-24 NOTE — ED NOTES
Rn walked past pt's room and pt noted to be standing at bottom of litter, holding onto walker  RN entered room and pt states that she needs to use the BR  Pt reminded to use call bell, which was at pt's bedside to call for assistance, prior to getting OOB  Pt assisted back to litter and disconnected from monitor  Pt walked with walker and RN to BR        Jaren Rutledge RN  03/24/23 4447

## 2023-03-24 NOTE — CONSULTS
Consultation - General Surgery   [facility]INONEZ [place] [facility] 59 [facility]  [facility] MRN: [id number]  Unit/Bed#: ED-02 Encounter: [id number]    Assessment/Plan     Assessment:  59 F with multiple prior abdominal operations, extensive hx of comorbid psychiatric disease c/d tardive dyskinesia p/w nausea vomiting and diarrhea  HR 90s, OVSS, room air     La 5 4> 1 9   WBC 6 99    CTAP reviewed, diffuse small bowel dilatation w/o discreet transition poin  Favor enteritis/evoving ileus over SBO per se      Plan:  -NPO  -NGT placement advised if persistent vomiting   -IV fluid hydration  -no antibiotics at present  -pain/nausea control   electrolyte correction   -DVT PPX  -addiitional care as per primary     History of Present Illness   History, ROS and PFSH unobtainable from any source due to n/a  HPI:  Mitchel Carroll is a 59 y o  female who presents with acute nausea, vomiting and diarrhea  She reports passage of flatus with her bowel movements, and has had abdominal pain throughout the day up until her presentation tot  ER  She has had multiple prior abdominal operations  Her symptoms began acutely  At present, she is vomiting, but had had some effective response to antinausea medications overnight  Consults    Review of Systems   Constitutional: Positive for activity change and appetite change  Gastrointestinal: Positive for abdominal distention, abdominal pain, diarrhea, nausea and vomiting  Psychiatric/Behavioral: Positive for agitation  All other systems reviewed and are negative  Historical Information   Past Medical History:   Diagnosis Date   • Anxiety    • Arthritis     left knee   • At risk for falls    • Bipolar 2 disorder ([address] )     FOLLOWS WITH PSYCHIATRIST   CONTINUE LAMOTRIGINE; RESOLVED: [id number]   • Depression    • Familial tremor     both hands   • Fibromyalgia     LAST ASSESSED: [id number]   • GERD (gastroesophageal reflux disease)    • Hearing aid worn     left ear   • [place] (hard of hearing)     left ear   • Hyperlipidemia    • Hypertension    • Left-sided weakness    • Memory loss of unknown cause     long and short term   • Migraine    • Obesity    • Obesity, Class II, BMI 35-39 9    • Overactive bladder    • Panic attack    • Post traumatic stress disorder    • S/P insertion of spinal cord stimulator     no remote   • Seasonal allergies    • Seizures ([address] )     possible seizure like activity   • Stroke [facility])     questionable stroke 2009   • Tardive dyskinesia     PATIENT STATES   • 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    CONT ASA 81 MG DAILY; RESOLVED: [id number]   • Urinary incontinence    • Uses walker    • Wears dentures     partial lower / full upper- doesnt wear   • Wears glasses      Past Surgical History:   Procedure Laterality Date   • BACK SURGERY     •  SECTION     • COLONOSCOPY      RESOLVED: [id number]   • EAR SURGERY     • EGD     • HYSTERECTOMY     • MYRINGOTOMY W/ TUBES Left    • NECK SURGERY  2019   • OK ARTHRP KNE CONDYLE&PLATU MEDIAL&LAT COMPARTMENTS Right 3/9/2022    Procedure: ARTHROPLASTY KNEE TOTAL;  Surgeon: Laura Soares DO;  Location: [facility];  Service: Orthopedics   • OK ARTHRP KNE CONDYLE&PLATU MEDIAL&LAT COMPARTMENTS Left 2022    Procedure: ARTHROPLASTY KNEE TOTAL;  Surgeon: Laura Soares DO;  [facility];  Service: Orthopedics   • OK CYSTOURETHROSCOPY N/A 2016    Procedure: CYSTOSCOPY, BOTOX INJECTION;  Surgeon: Tye Tapia MD;  Location: [facility];  Service: Gynecology   • OK INSJ/RPLCMT SPI NPGR DIR/INDUXIVE COUPLING Right 2/10/2021    Procedure: REPLACEMENT IMPLANTABLE PULSE GENERATOR DORSAL SPINAL COLUMN STIMULATOR, RIGHT;  Surgeon: Yevgeniy Rothman MD;  [facility];  Service: Neurosurgery   • OK PRQ  [address] NSTIM ELECTRODE ARRAY EPIDURAL Right 2020    Procedure: INSERTION THORACIC DORSAL COLUMN SPINAL CORD STIMULATOR PERCUTANEOUS W IMPLANTABLE PULSE GENERATOR, RIGHT;  Surgeon: Jose Scott Wandy Kelly MD;  Location:  MAIN OR;  Service: Neurosurgery   • TONSILLECTOMY     • TUBAL LIGATION  1986   • UPPER GASTROINTESTINAL ENDOSCOPY  09/2020     Social History   Social History     Substance and Sexual Activity   Alcohol Use Not Currently    Comment: 2 x year; being a social drinker as per all scripts      Social History     Substance and Sexual Activity   Drug Use No     E-Cigarette/Vaping   • E-Cigarette Use Never User      E-Cigarette/Vaping Substances   • Nicotine No    • THC No    • CBD No    • Flavoring No    • Other No    • Unknown No      Social History     Tobacco Use   Smoking Status Never   Smokeless Tobacco Never     Family History:   Family History   Problem Relation Age of Onset   • Colon cancer Mother    • Alzheimer's disease Father    • Stroke Father    • Colon cancer Brother    • Bipolar disorder Brother    • Breast cancer Maternal Aunt    • Colon cancer Maternal Aunt    • Heart disease Other    • Diabetes Other    • Hypertension Other    • Seizures Son    • Depression Paternal Grandfather    • No Known Problems Sister    • No Known Problems Brother    • Thyroid disease Neg Hx        Meds/Allergies   current meds:   Current Facility-Administered Medications   Medication Dose Route Frequency   • acetaminophen (TYLENOL) tablet 650 mg  650 mg Oral Q6H PRN   • aluminum-magnesium hydroxide-simethicone (MYLANTA) oral suspension 30 mL  30 mL Oral Q6H PRN   • amLODIPine (NORVASC) tablet 10 mg  10 mg Oral Daily   • aspirin (ECOTRIN LOW STRENGTH) EC tablet 81 mg  81 mg Oral Daily   • atorvastatin (LIPITOR) tablet 40 mg  40 mg Oral After Dinner   • benztropine (COGENTIN) tablet 1 mg  1 mg Oral BID   • busPIRone (BUSPAR) tablet 20 mg  20 mg Oral TID   • ferrous sulfate tablet 325 mg  325 mg Oral Daily With Breakfast   • folic acid (FOLVITE) tablet 1,000 mcg  1,000 mcg Oral Daily   • HYDROmorphone (DILAUDID) injection 0 5 mg  0 5 mg Intravenous Q4H PRN   • hydrOXYzine HCL (ATARAX) tablet 50 mg  50 mg "Oral TID PRN   • lithium carbonate (LITHOBID) CR tablet 300 mg  300 mg Oral HS   • LORazepam (ATIVAN) tablet 0 5 mg  0 5 mg Oral BID PRN   • mirtazapine (REMERON) tablet 7 5 mg  7 5 mg Oral QPM   • morphine injection 2 mg  2 mg Intravenous Q4H PRN   • morphine injection 4 mg  4 mg Intravenous Q4H PRN   • ondansetron (ZOFRAN) injection 4 mg  4 mg Intravenous Q6H PRN   • pantoprazole (PROTONIX) EC tablet 20 mg  20 mg Oral Early Morning   • PARoxetine (PAXIL) tablet 30 mg  30 mg Oral BID   • PARoxetine (PAXIL) tablet 60 mg  60 mg Oral HS   • prazosin (MINIPRESS) capsule 2 mg  2 mg Oral HS   • pregabalin (LYRICA) capsule 50 mg  50 mg Oral BID   • QUEtiapine (SEROquel) tablet 50 mg  50 mg Oral TID   • sodium chloride 0 9 % infusion  150 mL/hr Intravenous Continuous   • Valbenazine Tosylate CAPS 40 mg  40 mg Oral Daily     No Known Allergies    Objective   First Vitals:   Blood Pressure: 121/97 (03/23/23 2143)  Pulse: (!) 109 (03/23/23 2143)  Temperature: 98 8 °F (37 1 °C) (03/23/23 2143)  Temp Source: Oral (03/23/23 2143)  Respirations: 20 (03/23/23 2143)  Height: 5' 1\" (154 9 cm) (03/24/23 0130)  Weight - Scale: 72 5 kg (159 lb 13 3 oz) (03/23/23 2151)  SpO2: 100 % (03/23/23 2143)    Current Vitals:   Blood Pressure: 144/97 (03/24/23 0600)  Pulse: 93 (03/24/23 0600)  Temperature: 98 8 °F (37 1 °C) (03/23/23 2143)  Temp Source: Oral (03/23/23 2143)  Respirations: 18 (03/24/23 0600)  Height: 5' 1\" (154 9 cm) (03/24/23 0130)  Weight - Scale: 72 5 kg (159 lb 13 3 oz) (03/24/23 0130)  SpO2: 95 % (03/24/23 0600)      Intake/Output Summary (Last 24 hours) at 3/24/2023 0741  Last data filed at 3/23/2023 4504  Gross per 24 hour   Intake 2000 ml   Output --   Net 2000 ml       Invasive Devices     Peripheral Intravenous Line  Duration           Peripheral IV Left Antecubital -- days    Peripheral IV 03/24/23 Proximal;Right;Ventral (anterior) Forearm <1 day                Physical Exam  Constitutional:       General: She is not in " acute distress  Appearance: She is ill-appearing  HENT:      Head: Normocephalic and atraumatic  Mouth/Throat:      Mouth: Mucous membranes are dry  Eyes:      General: No scleral icterus  Pupils: Pupils are equal, round, and reactive to light  Cardiovascular:      Rate and Rhythm: Normal rate and regular rhythm  Pulses: Normal pulses  Pulmonary:      Effort: Pulmonary effort is normal  No respiratory distress  Abdominal:      General: A surgical scar is present  There is no distension  Palpations: Abdomen is soft  Tenderness: There is generalized abdominal tenderness and tenderness in the epigastric area  There is no guarding or rebound  Musculoskeletal:         General: No swelling, tenderness or deformity  Cervical back: Normal range of motion  No rigidity  Skin:     General: Skin is warm and dry  Capillary Refill: Capillary refill takes 2 to 3 seconds  Coloration: Skin is not jaundiced  Neurological:      Mental Status: She is alert and oriented to person, place, and time  Mental status is at baseline  Psychiatric:         Mood and Affect: Mood normal          Behavior: Behavior normal          Lab Results:   I have personally reviewed pertinent lab results    , CBC:   Lab Results   Component Value Date    WBC 6 99 03/24/2023    HGB 14 6 03/24/2023    HCT 44 2 03/24/2023    MCV 89 03/24/2023     03/24/2023    MCH 29 4 03/24/2023    MCHC 33 0 03/24/2023    RDW 15 0 03/24/2023    MPV 8 8 (L) 03/24/2023    NRBC 0 03/24/2023   , CMP:   Lab Results   Component Value Date    SODIUM 142 03/24/2023    K 3 8 03/24/2023     (H) 03/24/2023    CO2 22 03/24/2023    BUN 16 03/24/2023    CREATININE 0 81 03/24/2023    CALCIUM 8 3 (L) 03/24/2023    AST 12 (L) 03/24/2023    ALT 9 03/24/2023    ALKPHOS 78 03/24/2023    EGFR 79 03/24/2023   , Coagulation:   Lab Results   Component Value Date    INR 0 93 03/23/2023   , Urinalysis:   Lab Results   Component Value Date    COLORU Yellow 03/24/2023    CLARITYU Clear 03/24/2023    SPECGRAV 1 015 03/24/2023    PHUR 7 5 03/24/2023    LEUKOCYTESUR Small (A) 03/24/2023    NITRITE Negative 03/24/2023    GLUCOSEU Negative 03/24/2023    KETONESU Negative 03/24/2023    BILIRUBINUR Negative 03/24/2023    BLOODU Trace (A) 03/24/2023     Imaging: I have personally reviewed pertinent reports  and I have personally reviewed pertinent films in PACS  EKG, Pathology, and Other Studies: I have personally reviewed pertinent reports  and I have personally reviewed pertinent films in PACS    CT chest abdomen pelvis w contrast    Result Date: 3/24/2023  Impression: Small amount of perihepatic and pelvic fluid     Mid to distal high-grade small bowel obstruction possibly due to adhesions  Groundglass changes in the right lower lobe may be due to respiration or developing infiltrate  The study was marked in [facility] for immediate notification   Workstation performed: [id number]

## 2023-03-24 NOTE — ASSESSMENT & PLAN NOTE
· Patient with chronic tardive dyskinesia secondary to psychiatric medications  · Would like to avoid NG tube treatment for SBO if possible due to this  · Continue 1 mg cogentin b i d and 40 mg ingrezza daily

## 2023-03-24 NOTE — ED NOTES
"3 unsuccessful attempts at NG insertion to right nares with pt crying out in pain  Pt refusing further attempts to right or left nares  Dr Yung Edward and Nida Denis informed of same  Per Dr Christophe Condon  Someone from surgical team will re engage a bit later  \"     Jett Session  03/24/23 3781    "

## 2023-03-24 NOTE — ED NOTES
At 1230 a sTT was sent to Surgery [facility], GI Res, Dr Papi Calix for guidance on supportive care of pt  Pt has vomited x 2 since attempt at IV placement, with the emesis smelling of feces  There has been no response received to this time        Thor Vaca RN  03/24/23 3974

## 2023-03-24 NOTE — ED NOTES
Patient transported to [address], [facility] [address]  03/23/23 1258

## 2023-03-25 LAB
ALBUMIN SERPL BCP-MCNC: 3.5 G/DL (ref 3.5–5)
ALP SERPL-CCNC: 55 U/L (ref 34–104)
ALT SERPL W P-5'-P-CCNC: 7 U/L (ref 7–52)
ANION GAP SERPL CALCULATED.3IONS-SCNC: 7 MMOL/L (ref 4–13)
AST SERPL W P-5'-P-CCNC: 10 U/L (ref 13–39)
BASOPHILS # BLD MANUAL: 0 THOUSAND/UL (ref 0–0.1)
BASOPHILS NFR MAR MANUAL: 0 % (ref 0–1)
BILIRUB SERPL-MCNC: 1.44 MG/DL (ref 0.2–1)
BUN SERPL-MCNC: 29 MG/DL (ref 5–25)
CALCIUM SERPL-MCNC: 7.9 MG/DL (ref 8.4–10.2)
CHLORIDE SERPL-SCNC: 113 MMOL/L (ref 96–108)
CO2 SERPL-SCNC: 24 MMOL/L (ref 21–32)
CREAT SERPL-MCNC: 0.97 MG/DL (ref 0.6–1.3)
EOSINOPHIL # BLD MANUAL: 0.07 THOUSAND/UL (ref 0–0.4)
EOSINOPHIL NFR BLD MANUAL: 2 % (ref 0–6)
ERYTHROCYTE [DISTWIDTH] IN BLOOD BY AUTOMATED COUNT: 15.2 % (ref 11.6–15.1)
GFR SERPL CREATININE-BSD FRML MDRD: 64 ML/MIN/1.73SQ M
GLUCOSE SERPL-MCNC: 110 MG/DL (ref 65–140)
HCT VFR BLD AUTO: 35.8 % (ref 34.8–46.1)
HGB BLD-MCNC: 11.4 G/DL (ref 11.5–15.4)
LYMPHOCYTES # BLD AUTO: 0.89 THOUSAND/UL (ref 0.6–4.47)
LYMPHOCYTES # BLD AUTO: 27 % (ref 14–44)
MCH RBC QN AUTO: 29.8 PG (ref 26.8–34.3)
MCHC RBC AUTO-ENTMCNC: 31.8 G/DL (ref 31.4–37.4)
MCV RBC AUTO: 94 FL (ref 82–98)
MONOCYTES # BLD AUTO: 0.4 THOUSAND/UL (ref 0–1.22)
MONOCYTES NFR BLD: 12 % (ref 4–12)
NEUTROPHILS # BLD MANUAL: 1.95 THOUSAND/UL (ref 1.85–7.62)
NEUTS BAND NFR BLD MANUAL: 5 % (ref 0–8)
NEUTS SEG NFR BLD AUTO: 54 % (ref 43–75)
PLATELET # BLD AUTO: 264 THOUSANDS/UL (ref 149–390)
PLATELET BLD QL SMEAR: ADEQUATE
PMV BLD AUTO: 10.1 FL (ref 8.9–12.7)
POTASSIUM SERPL-SCNC: 3.6 MMOL/L (ref 3.5–5.3)
PROT SERPL-MCNC: 5.7 G/DL (ref 6.4–8.4)
RBC # BLD AUTO: 3.83 MILLION/UL (ref 3.81–5.12)
RBC MORPH BLD: NORMAL
SODIUM SERPL-SCNC: 144 MMOL/L (ref 135–147)
WBC # BLD AUTO: 3.3 THOUSAND/UL (ref 4.31–10.16)

## 2023-03-25 RX ORDER — ENOXAPARIN SODIUM 100 MG/ML
40 INJECTION SUBCUTANEOUS
Status: DISCONTINUED | OUTPATIENT
Start: 2023-03-25 | End: 2023-04-06 | Stop reason: HOSPADM

## 2023-03-25 RX ORDER — HYDROMORPHONE HCL/PF 1 MG/ML
0.5 SYRINGE (ML) INJECTION EVERY 4 HOURS PRN
Status: DISCONTINUED | OUTPATIENT
Start: 2023-03-25 | End: 2023-04-06 | Stop reason: HOSPADM

## 2023-03-25 RX ORDER — OXYCODONE HYDROCHLORIDE 5 MG/1
5 TABLET ORAL EVERY 4 HOURS PRN
Status: DISCONTINUED | OUTPATIENT
Start: 2023-03-25 | End: 2023-04-06 | Stop reason: HOSPADM

## 2023-03-25 RX ADMIN — PAROXETINE 60 MG: 20 TABLET, FILM COATED ORAL at 21:37

## 2023-03-25 RX ADMIN — ATORVASTATIN CALCIUM 40 MG: 40 TABLET, FILM COATED ORAL at 17:47

## 2023-03-25 RX ADMIN — FERROUS SULFATE TAB 325 MG (65 MG ELEMENTAL FE) 325 MG: 325 (65 FE) TAB at 08:36

## 2023-03-25 RX ADMIN — BENZTROPINE MESYLATE 1 MG: 1 TABLET ORAL at 08:36

## 2023-03-25 RX ADMIN — BUSPIRONE HYDROCHLORIDE 20 MG: 10 TABLET ORAL at 21:37

## 2023-03-25 RX ADMIN — OXYCODONE 5 MG: 5 TABLET ORAL at 13:58

## 2023-03-25 RX ADMIN — OXYCODONE 5 MG: 5 TABLET ORAL at 19:52

## 2023-03-25 RX ADMIN — FOLIC ACID 1000 MCG: 1 TABLET ORAL at 08:36

## 2023-03-25 RX ADMIN — PAROXETINE 30 MG: 20 TABLET, FILM COATED ORAL at 17:47

## 2023-03-25 RX ADMIN — HYDROMORPHONE HYDROCHLORIDE 0.5 MG: 1 INJECTION, SOLUTION INTRAMUSCULAR; INTRAVENOUS; SUBCUTANEOUS at 21:35

## 2023-03-25 RX ADMIN — QUETIAPINE FUMARATE 50 MG: 25 TABLET ORAL at 21:40

## 2023-03-25 RX ADMIN — QUETIAPINE FUMARATE 50 MG: 25 TABLET ORAL at 08:36

## 2023-03-25 RX ADMIN — LITHIUM CARBONATE 300 MG: 300 TABLET, FILM COATED, EXTENDED RELEASE ORAL at 21:39

## 2023-03-25 RX ADMIN — PREGABALIN 50 MG: 50 CAPSULE ORAL at 08:36

## 2023-03-25 RX ADMIN — BUSPIRONE HYDROCHLORIDE 20 MG: 10 TABLET ORAL at 15:47

## 2023-03-25 RX ADMIN — BUSPIRONE HYDROCHLORIDE 20 MG: 10 TABLET ORAL at 08:36

## 2023-03-25 RX ADMIN — ASPIRIN 81 MG: 81 TABLET, COATED ORAL at 08:36

## 2023-03-25 RX ADMIN — BENZTROPINE MESYLATE 1 MG: 1 TABLET ORAL at 17:47

## 2023-03-25 RX ADMIN — ALUMINUM HYDROXIDE, MAGNESIUM HYDROXIDE, AND DIMETHICONE 30 ML: 200; 20; 200 SUSPENSION ORAL at 18:41

## 2023-03-25 RX ADMIN — PRAZOSIN HYDROCHLORIDE 2 MG: 2 CAPSULE ORAL at 21:36

## 2023-03-25 RX ADMIN — PANTOPRAZOLE SODIUM 40 MG: 40 INJECTION, POWDER, FOR SOLUTION INTRAVENOUS at 08:36

## 2023-03-25 RX ADMIN — QUETIAPINE FUMARATE 50 MG: 25 TABLET ORAL at 15:47

## 2023-03-25 RX ADMIN — ONDANSETRON 4 MG: 2 INJECTION INTRAMUSCULAR; INTRAVENOUS at 15:47

## 2023-03-25 RX ADMIN — PAROXETINE 30 MG: 20 TABLET, FILM COATED ORAL at 08:36

## 2023-03-25 RX ADMIN — MIRTAZAPINE 7.5 MG: 15 TABLET, FILM COATED ORAL at 17:47

## 2023-03-25 RX ADMIN — PREGABALIN 50 MG: 50 CAPSULE ORAL at 17:47

## 2023-03-25 NOTE — UTILIZATION REVIEW
Initial Clinical Review    Admission: Date/Time/Statement:   Admission Orders (From admission, onward)     Ordered        03/24/23 0129  INPATIENT ADMISSION  Once                      Orders Placed This Encounter   Procedures   • INPATIENT ADMISSION     Standing Status:   Standing     Number of Occurrences:   1     Order Specific Question:   Level of Care     Answer:   Med Surg [16]     Order Specific Question:   Estimated length of stay     Answer:   More than 2 Midnights     Order Specific Question:   Certification     Answer:   I certify that inpatient services are medically necessary for this patient for a duration of greater than two midnights  See H&P and MD Progress Notes for additional information about the patient's course of treatment  ED Arrival Information     Expected   -    Arrival   3/23/2023 21:35    Acuity   Urgent            Means of arrival   Ambulance    Escorted by   [facility] ([address])    Service   Hospitalist    Admission type   Emergency            Arrival complaint   chest pain            Chief Complaint   Patient presents with   • Abdominal Pain     Pt reports abdominal pain with N/V/D since this am  No known sick contacts  Zofran administered by EMS   • Chest Pain     Pt reports chest pain, 4 morphine given by EMS       Initial Presentation: 59 y o  female presents to ED from home via EMS due to vomiting and diarrhea Exam notes patient is ill appearing, obese, dry mucous membranes, abdominal distention and diffuse abdominal tenderness, + guarding, tachycardic, impaired memory and withdrawn behavior WNC 10 43 CTAP concerning for distal small bowel obstruction, LA elevated   History of bipolar II disorder, tardive dyskinesia, HTN, HLD, memory loss followed as OP by neurology , chronic pain Admitted as Inpatient to med surg for SBO Plan Consult surgery, continue IV fluids 150 ml/h,  IV antiemetics, NPO, Continue home meds    Surgery 3/24 59 F with multiple prior abdominal operations, extensive hx of comorbid psychiatric disease c/d tardive dyskinesia p/w nausea vomiting and diarrhea  She is passing gas with multiple episodes of loose stools  Denies any sick contacts  CT shows dilated small bowel with transition point however given her clinical picture this is much more likely infectious gastroenteritis versus true small bowel obstruction  Recommend IV fluid resuscitation nausea treatment, check stool studies   Hold off on NGT if possible due to tardive  Dyskinesia symptoms  NGT insertion attempted in ED  Due to pt was actively vomiting, unsuccessful, after which pt refused Date: 3/25    Day 2:    Exam oriented x 3, abdomen soft, non tender, repetitive involuntary tongue movements    Surgery Multiple loose bowel movements, continue IVF, can advance diet as tolerated     ED Triage Vitals [03/23/23 2143]   Temperature Pulse Respirations Blood Pressure SpO2   98 8 °F (37 1 °C) (!) 109 20 121/97 100 %      Temp Source Heart Rate Source Patient Position - Orthostatic VS BP Location FiO2 (%)   Oral Monitor Lying Right arm --      Pain Score       10 - Worst Possible Pain          Wt Readings from Last 1 Encounters:   03/24/23 72 5 kg (159 lb 13 3 oz)     Additional Vital Signs:      Date/Time Temp Pulse Resp BP MAP (mmHg) SpO2 O2 Device Patient Position - Orthostatic VS   03/25/23 0845 -- -- -- -- -- -- None (Room air) --   03/25/23 08:10:45 98 2 °F (36 8 °C) 73 17 109/76 87 90 % None (Room air) --   03/24/23 22:28:47 -- 85 18 107/74 85 91 % -- --   03/24/23 2123 -- -- -- 132/69 -- -- -- --   03/24/23 17:10:03 97 4 °F (36 3 °C) Abnormal  84 20 139/94 109 99 % None (Room air) Lying   03/24/23 1600 -- 83 20 131/71 95 100 % None (Room air) Lying   03/24/23 1537 99 4 °F (37 4 °C) -- -- -- -- -- -- --   03/24/23 1535 -- 81 20 141/90 111 100 % None (Room air) Lying   03/24/23 0921 -- 90 25 Abnormal  121/90 99 100 % None (Room air) Lying   03/24/23 0600 -- 93 18 144/97 117 95 % None (Room air) Lying   03/24/23 0400 -- 93 20 140/98 116 94 % None (Room air) Lying   03/24/23 0130 -- 90 18 140/98 105 95 % None (Room air) Lying       Pertinent Labs/Diagnostic Test Results:   CT chest abdomen pelvis w contrast   Final Result by Josefina Lanier MD (03/24 2528)   Small amount of perihepatic and pelvic fluid           Mid to distal high-grade small bowel obstruction possibly due to adhesions  Groundglass changes in the right lower lobe may be due to respiration or developing infiltrate  The study was marked in [facility] for immediate notification        Workstation performed: [id number]           Results from last 7 days   Lab Units 03/23/23  2302   SARS-COV-2  Negative     Results from last 7 days   Lab Units 03/25/23 0441 03/24/23  0621 03/23/23  2157   WBC Thousand/uL 3 30* 6 99 10 43*   HEMOGLOBIN g/dL 11 4* 14 6 16 6*   HEMATOCRIT % 35 8 44 2 49 3*   PLATELETS Thousands/uL 264 382 486*   NEUTROS ABS Thousands/µL  --  5 61  --    BANDS PCT % 5  --  1         Results from last 7 days   Lab Units 03/25/23 0441 03/24/23  0621 03/23/23  2157   SODIUM mmol/L 144 142 140   POTASSIUM mmol/L 3 6 3 8 3 3*   CHLORIDE mmol/L 113* 111* 99   CO2 mmol/L 24 22 21   ANION GAP mmol/L 7 9 20*   BUN mg/dL 29* 16 13   CREATININE mg/dL 0 97 0 81 1 18   EGFR ml/min/1 73sq m 64 79 50   CALCIUM mg/dL 7 9* 8 3* 10 3*   MAGNESIUM mg/dL  --  2 5 1 9     Results from last 7 days   Lab Units 03/25/23 0441 03/24/23  0621 03/23/23  2157   AST U/L 10* 12* 16   ALT U/L 7 9 13   ALK PHOS U/L 55 78 100   TOTAL PROTEIN g/dL 5 7* 6 5 8 1   ALBUMIN g/dL 3 5 4 1 5 0   TOTAL BILIRUBIN mg/dL 1 44* 1 24* 2 05*         Results from last 7 days   Lab Units 03/25/23  0441 03/24/23  0621 03/23/23  2157   GLUCOSE RANDOM mg/dL 110 140 233*             No results found for: BETA-HYDROXYBUTYRATE                   Results from last 7 days   Lab Units 03/24/23  0201 03/24/23  0010 03/23/23  2157   HS TNI 0HR ng/L  --   --  4   HS TNI 2HR ng/L  -- 10  --    HSTNI D2 ng/L  --  6  --    HS TNI 4HR ng/L 17  --   --    HSTNI D4 ng/L 13  --   --          Results from last 7 days   Lab Units 03/23/23  2157   PROTIME seconds 12 5   INR  0 93   PTT seconds 30             Results from last 7 days   Lab Units 03/24/23  0055 03/23/23  2157   LACTIC ACID mmol/L 1 9 5 2*             Results from last 7 days   Lab Units 03/23/23  2157   BNP pg/mL 16             Results from last 7 days   Lab Units 03/23/23  2157   LIPASE u/L 7*                 Results from last 7 days   Lab Units 03/24/23  0014   CLARITY UA  Clear   COLOR UA  Yellow   SPEC GRAV UA  1 015   PH UA  7 5   GLUCOSE UA mg/dl Negative   KETONES UA mg/dl Negative   BLOOD UA  Trace*   PROTEIN UA mg/dl Negative   NITRITE UA  Negative   BILIRUBIN UA  Negative   UROBILINOGEN UA E U /dl 0 2   LEUKOCYTES UA  Small*   WBC UA /hpf 0-5*   RBC UA /hpf 0-1*   BACTERIA UA /hpf Occasional   EPITHELIAL CELLS WET PREP /hpf Occasional     Results from last 7 days   Lab Units 03/23/23  2302   INFLUENZA A PCR  Negative   INFLUENZA B PCR  Negative   RSV PCR  Negative       ED Treatment:   Medication Administration from 03/23/2023 2135 to 03/24/2023 1658       Date/Time Order Dose Route Action     03/23/2023 2259 EDT sodium chloride 0 9 % bolus 1,000 mL 1,000 mL Intravenous New Bag     03/23/2023 2303 EDT LORazepam (ATIVAN) injection 1 mg 1 mg Intravenous Given     03/23/2023 2258 EDT sodium chloride 0 9 % bolus 1,000 mL 1,000 mL Intravenous New Bag     03/23/2023 2250 EDT iohexol (OMNIPAQUE) 350 MG/ML injection (SINGLE-DOSE) 100 mL 100 mL Intravenous Given     03/23/2023 2303 EDT fentanyl citrate (PF) 100 MCG/2ML 50 mcg 50 mcg Intravenous Given     03/24/2023 0113 EDT magnesium sulfate 2 g/50 mL IVPB (premix) 2 g 2 g Intravenous New Bag     03/24/2023 0113 EDT fentanyl citrate (PF) 100 MCG/2ML 50 mcg 50 mcg Intravenous Given     03/24/2023 1601 EDT sodium chloride 0 9 % infusion 0 mL/hr Intravenous Stopped     03/24/2023 0113 EDT sodium chloride 0 9 % infusion 150 mL/hr Intravenous New Bag     03/24/2023 0402 EDT potassium chloride 20 mEq IVPB (premix) 0 mEq Intravenous Stopped     03/24/2023 0202 EDT potassium chloride 20 mEq IVPB (premix) 20 mEq Intravenous New Bag     03/24/2023 0840 EDT amLODIPine (NORVASC) tablet 10 mg 0 mg Oral Hold     03/24/2023 0840 EDT aspirin (ECOTRIN LOW STRENGTH) EC tablet 81 mg 0 mg Oral Hold     03/24/2023 0841 EDT benztropine (COGENTIN) tablet 1 mg 0 mg Oral Hold     03/24/2023 0841 EDT busPIRone (BUSPAR) tablet 20 mg 0 mg Oral Hold     03/24/2023 0843 EDT ferrous sulfate tablet 325 mg 0 mg Oral Hold     03/24/2023 0839 EDT ferrous sulfate tablet 325 mg 325 mg Oral Not Given     55/93/2108 9130 EDT folic acid (FOLVITE) tablet 1,000 mcg 0 mcg Oral Hold     03/24/2023 0842 EDT Valbenazine Tosylate CAPS 40 mg 0 mg Oral Hold     03/24/2023 4925 EDT pantoprazole (PROTONIX) EC tablet 20 mg 20 mg Oral Given     03/24/2023 0842 EDT PARoxetine (PAXIL) tablet 30 mg 0 mg Oral Hold     03/24/2023 0842 EDT pregabalin (LYRICA) capsule 50 mg 0 mg Oral Hold     03/24/2023 0842 EDT QUEtiapine (SEROquel) tablet 50 mg 0 mg Oral Hold     03/24/2023 0425 EDT multi-electrolyte (PLASMALYTE-A/ISOLYTE-S PH 7 4) IV solution -- Intravenous Canceled Entry     03/24/2023 0920 EDT ondansetron (ZOFRAN) injection 4 mg 4 mg Intravenous Not Given     03/24/2023 0911 EDT ondansetron (ZOFRAN) injection 4 mg 4 mg Intravenous Given     03/24/2023 0427 EDT ondansetron (ZOFRAN) injection 4 mg 4 mg Intravenous Given     03/24/2023 1205 EDT morphine injection 2 mg 2 mg Intravenous Given     03/24/2023 0427 EDT morphine injection 2 mg 2 mg Intravenous Given     03/24/2023 0631 EDT HYDROmorphone (DILAUDID) injection 0 5 mg 0 5 mg Intravenous Given     03/24/2023 0849 EDT lidocaine (URO-JET) 2 % urethral/mucosal gel 1 application   -- Urethral Canceled Entry     03/24/2023 0911 EDT pantoprazole (PROTONIX) injection 40 mg 40 mg Intravenous Given 03/24/2023 0853 EDT lidocaine (URO-JET) 2 % urethral/mucosal gel 1 application  1 application  Topical Given        Past Medical History:   Diagnosis Date   • Anxiety    • Arthritis     left knee   • At risk for falls    • Bipolar 2 disorder (HCC)     FOLLOWS WITH PSYCHIATRIST  CONTINUE LAMOTRIGINE; RESOLVED: [id number]   • Depression    • Familial tremor     both hands   • Fibromyalgia     LAST ASSESSED: [id number]   • GERD (gastroesophageal reflux disease)    • Hearing aid worn     left ear   • [place] (hard of hearing)     left ear   • Hyperlipidemia    • Hypertension    • Left-sided weakness    • Memory loss of unknown cause     long and short term   • Migraine    • Obesity    • Obesity, Class II, BMI 35-39 9    • Overactive bladder    • Panic attack    • Post traumatic stress disorder    • S/P insertion of spinal cord stimulator     no remote   • Seasonal allergies    • Seizures ([facility])     possible seizure like activity   • Stroke [facility])     questionable stroke 2009   • Tardive dyskinesia     PATIENT STATES   • 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    CONT ASA 81 MG DAILY; RESOLVED: [id number]   • Urinary incontinence    • Uses walker    • Wears dentures     partial lower / full upper- doesnt wear   • Wears glasses      Present on Admission:  • Bipolar II disorder ([facility] [address] )  • Tardive dyskinesia  • Mixed hyperlipidemia  • Essential hypertension  • Chronic pain disorder      Admitting Diagnosis: Hiatal hernia [K44 9]  Hypokalemia [E87 6]  Lactic acidosis [E87 20]  Small bowel obstruction (HCC) [K56 609]  Chest pain [R07 9]  Abdominal pain [R10 9]  Nausea & vomiting [R11 2]  Age/Sex: 59 y o  female  Admission Orders:  Scheduled Medications:  amLODIPine, 10 mg, Oral, Daily  aspirin, 81 mg, Oral, Daily  atorvastatin, 40 mg, Oral, After Dinner  benztropine, 1 mg, Oral, BID  busPIRone, 20 mg, Oral, TID  enoxaparin, 40 mg, Subcutaneous, Q24H KESHIA  ferrous sulfate, 325 mg, Oral, Daily With Breakfast  folic acid, 1,000 mcg, Oral, Daily  lithium carbonate, 300 mg, Oral, HS  mirtazapine, 7 5 mg, Oral, QPM  pantoprazole, 40 mg, Intravenous, Q24H KESHIA  PARoxetine, 30 mg, Oral, BID  PARoxetine, 60 mg, Oral, HS  prazosin, 2 mg, Oral, HS  pregabalin, 50 mg, Oral, BID  QUEtiapine, 50 mg, Oral, TID  Valbenazine Tosylate, 40 mg, Oral, Daily      Continuous IV Infusions:  sodium chloride, 75 mL/hr, Intravenous, Continuous      PRN Meds:   Dilaudid 0 5 mg IV for breakthrough pain 3/24 x 2 doses    Morphine 2 mg IV x 3 for moderate pain 3/24   acetaminophen, 650 mg, Oral, Q6H PRN  aluminum-magnesium hydroxide-simethicone, 30 mL, Oral, Q6H PRN  HYDROmorphone, 0 5 mg, Intravenous, Q4H PRN  hydrOXYzine HCL, 50 mg, Oral, TID PRN  LORazepam, 0 5 mg, Oral, BID PRN  ondansetron, 4 mg, Intravenous, Q6H PRN  IV x 2 3/24   oxyCODONE, 5 mg, Oral, Q4H PRN  PO x 1 3/25     3/25 clear liquid diet    Activity as tolerated    BMP, CBC 3/26     IP CONSULT TO ACUTE CARE SURGERY    Network Utilization Review Department  ATTENTION: Please call with any questions or concerns to [phone] and carefully listen to the prompts so that you are directed to the right person  All voicemails are confidential   Vibha Meek all requests for admission clinical reviews, approved or denied determinations and any other requests to dedicated fax number below belonging to the campus where the patient is receiving treatment   List of dedicated fax numbers for the Facilities:  [address] DENIALS (Administrative/Medical Necessity) [phone]   [address] (Maternity/NICU/Pediatrics) [phone]   [address] Cinthya Connolly [address] [phone]   [address] [phone]

## 2023-03-25 NOTE — ASSESSMENT & PLAN NOTE
Patient presented to the ED following multiple episodes of vomiting at home x one day  She reports two episodes of diarrhea at home, patient had multiple episodes of vomiting in the ED      · Surgery recommended NGT however attempts unsuccessful after which patient refused  · CT abdomen/pelvis demonstrating concern for distal small bowel obstruction  · Lactic acid 5 2-->1 9 following 2L fluids in ED  · Lipase <7  · Viral panel negative  · Surgery consultation appreciated  · Patient advance to clear liquid diet  · Continue IV fluids until tolerating adequate p o  intake, antiemetics, pain control

## 2023-03-25 NOTE — PLAN OF CARE
Problem: Potential for Falls  Goal: Patient will remain free of falls  Description: INTERVENTIONS:  - Educate patient/family on patient safety including physical limitations  - Instruct patient to call for assistance with activity   - Consult OT/PT to assist with strengthening/mobility   - Keep Call bell within reach  - Keep bed low and locked with side rails adjusted as appropriate  - Keep care items and personal belongings within reach  - Initiate and maintain comfort rounds  - Make Fall Risk Sign visible to staff    - Apply yellow socks and bracelet for high fall risk patients  - Consider moving patient to room near nurses station  Outcome: Progressing     Problem: PAIN - ADULT  Goal: Verbalizes/displays adequate comfort level or baseline comfort level  Description: Interventions:  - Encourage patient to monitor pain and request assistance  - Assess pain using appropriate pain scale  - Administer analgesics based on type and severity of pain and evaluate response  - Implement non-pharmacological measures as appropriate and evaluate response  - Consider cultural and social influences on pain and pain management  - Notify physician/advanced practitioner if interventions unsuccessful or patient reports new pain  Outcome: Progressing     Problem: INFECTION - ADULT  Goal: Absence or prevention of progression during hospitalization  Description: INTERVENTIONS:  - Assess and monitor for signs and symptoms of infection  - Monitor lab/diagnostic results  - Monitor all insertion sites, i e  indwelling lines, tubes, and drains  - Monitor endotracheal if appropriate and nasal secretions for changes in amount and color  - [place] appropriate cooling/warming therapies per order  - Administer medications as ordered  - Instruct and encourage patient and family to use good hand hygiene technique  - Identify and instruct in appropriate isolation precautions for identified infection/condition  Outcome: Progressing  Goal: Absence of fever/infection during neutropenic period  Description: INTERVENTIONS:  - Monitor WBC    Outcome: Progressing     Problem: SAFETY ADULT  Goal: Patient will remain free of falls  Description: INTERVENTIONS:  - Educate patient/family on patient safety including physical limitations  - Instruct patient to call for assistance with activity   - Consult OT/PT to assist with strengthening/mobility   - Keep Call bell within reach  - Keep bed low and locked with side rails adjusted as appropriate  - Keep care items and personal belongings within reach  - Initiate and maintain comfort rounds  - Make Fall Risk Sign visible to staff  - Apply yellow socks and bracelet for high fall risk patients  - Consider moving patient to room near nurses station  Outcome: Progressing  Goal: Maintain or return to baseline ADL function  Description: INTERVENTIONS:  -  Assess patient's ability to carry out ADLs; assess patient's baseline for ADL function and identify physical deficits which impact ability to perform ADLs (bathing, care of mouth/teeth, toileting, grooming, dressing, etc )  - Assess/evaluate cause of self-care deficits   - Assess range of motion  - Assess patient's mobility; develop plan if impaired  - Assess patient's need for assistive devices and provide as appropriate  - Encourage maximum independence but intervene and supervise when necessary  - Involve family in performance of ADLs  - Assess for home care needs following discharge   - Consider OT consult to assist with ADL evaluation and planning for discharge  - Provide patient education as appropriate  Outcome: Progressing  Goal: Maintains/Returns to pre admission functional level  Description: INTERVENTIONS:  - Perform BMAT or MOVE assessment daily    - Set and communicate daily mobility goal to care team and patient/family/caregiver     - Collaborate with rehabilitation services on mobility goals if consulted  - Record patient progress and toleration of activity level   Outcome: Progressing     Problem: DISCHARGE PLANNING  Goal: Discharge to home or other facility with appropriate resources  Description: INTERVENTIONS:  - Identify barriers to discharge w/patient and caregiver  - Arrange for needed discharge resources and transportation as appropriate  - Identify discharge learning needs (meds, wound care, etc )  - Arrange for interpretive services to assist at discharge as needed  - Refer to Case Management Department for coordinating discharge planning if the patient needs post-hospital services based on physician/advanced practitioner order or complex needs related to functional status, cognitive ability, or social support system  Outcome: Progressing     Problem: Knowledge Deficit  Goal: Patient/family/caregiver demonstrates understanding of disease process, treatment plan, medications, and discharge instructions  Description: Complete learning assessment and assess knowledge base    Interventions:  - Provide teaching at level of understanding  - Provide teaching via preferred learning methods  Outcome: Progressing     Problem: MOBILITY - ADULT  Goal: Maintain or return to baseline ADL function  Description: INTERVENTIONS:  -  Assess patient's ability to carry out ADLs; assess patient's baseline for ADL function and identify physical deficits which impact ability to perform ADLs (bathing, care of mouth/teeth, toileting, grooming, dressing, etc )  - Assess/evaluate cause of self-care deficits   - Assess range of motion  - Assess patient's mobility; develop plan if impaired  - Assess patient's need for assistive devices and provide as appropriate  - Encourage maximum independence but intervene and supervise when necessary  - Involve family in performance of ADLs  - Assess for home care needs following discharge   - Consider OT consult to assist with ADL evaluation and planning for discharge  - Provide patient education as appropriate  Outcome: Progressing  Goal: Maintains/Returns to pre admission functional level  Description: INTERVENTIONS:  - Perform BMAT or MOVE assessment daily    - Set and communicate daily mobility goal to care team and patient/family/caregiver     - Collaborate with rehabilitation services on mobility goals if consulted  - Out of bed for toileting  - Record patient progress and toleration of activity level   Outcome: Progressing

## 2023-03-25 NOTE — ASSESSMENT & PLAN NOTE
· Patient with chronic tardive dyskinesia secondary to psychiatric medications  · Continue 1 mg cogentin b i d and 40 mg ingrezza daily

## 2023-03-25 NOTE — RESTORATIVE TECHNICIAN NOTE
Restorative Technician Note      Patient Name: Jenaro Duncan     [facility] Tech Visit Date: 03/25/23  Note Type: Mobility  Patient Position Upon Consult: Supine  Activity Performed: Ambulated  Assistive Device: Roller walker  Patient Position at End of Consult: Supine;  All needs within reach; Bed/Chair alarm activated        ns

## 2023-03-25 NOTE — PROGRESS NOTES
"Progress Note - General Surgery   Samantha Rodriguez Captain 59 y o  female MRN: [id number]  Unit/Bed#: E5 -01 Encounter: [id number]    Assessment:  56yoF p/w enteritis vs partial SBO    Vitals stable, afebrile  WBC 3 3 from 6 99  Hemoglobin 11 4 from 14 6  Creatinine 0 97    UOP multiple  Multiple loose bowel movements    Plan:  -NPO, advance diet as tolerated, can advance to clear liquid diet from surgical standpoint, could consider advancing further later today if doing well  -IVF until tolerating adequate p o   -Pain control  -Encourage ambulation  -DVT prophylaxis  -Care per primary team    Subjective/Objective   Subjective:   Patient reports some abdominal pain and states this is improving compared to yesterday, remains n p o  with no nausea or vomiting, having multiple loose bowel movements, voiding without issues  Objective:     Blood pressure 109/76, pulse 73, temperature 98 2 °F (36 8 °C), temperature source Oral, resp  rate 17, height 5' 1\" (1 549 m), weight 72 5 kg (159 lb 13 3 oz), SpO2 90 %, not currently breastfeeding  ,Body mass index is 30 2 kg/m²  Intake/Output Summary (Last 24 hours) at 3/25/2023 0916  Last data filed at 3/25/2023 0848  Gross per 24 hour   Intake 1240 ml   Output --   Net 1240 ml       Invasive Devices     Peripheral Intravenous Line  Duration           Peripheral IV Left Antecubital -- days    Peripheral IV 03/24/23 Proximal;Right;Ventral (anterior) Forearm 1 day                Physical Exam:   General: NAD  Skin: Warm, dry, anicteric  HEENT: Normocephalic, atraumatic  CV: RRR, no m/r/g  Pulm: CTA b/l, no inc WOB  Abd: Soft, minimally distended, minimally tender to palpation diffusely  MSK: Symmetric, no edema, no tenderness, no deformity  Neuro: AOx3, GCS 15    Lab, Imaging and other studies:  I have personally reviewed pertinent lab results    , CBC:   Lab Results   Component Value Date    WBC 3 30 (L) 03/25/2023    HGB 11 4 (L) 03/25/2023    HCT 35 8 03/25/2023    MCV 94 " 03/25/2023     03/25/2023    MCH 29 8 03/25/2023    MCHC 31 8 03/25/2023    RDW 15 2 (H) 03/25/2023    MPV 10 1 03/25/2023   , CMP:   Lab Results   Component Value Date    SODIUM 144 03/25/2023    K 3 6 03/25/2023     (H) 03/25/2023    CO2 24 03/25/2023    BUN 29 (H) 03/25/2023    CREATININE 0 97 03/25/2023    CALCIUM 7 9 (L) 03/25/2023    AST 10 (L) 03/25/2023    ALT 7 03/25/2023    ALKPHOS 55 03/25/2023    EGFR 64 03/25/2023     VTE Pharmacologic Prophylaxis: Sequential compression device (Venodyne)   VTE Mechanical Prophylaxis: sequential compression device

## 2023-03-25 NOTE — PROGRESS NOTES
[address] [place] [address]  Progress Note  Name: Diya Whitehead  MRN: [id number]  Unit/Bed#: E5 -01 I Date of Admission: 3/23/2023   Date of Service: 3/25/2023 I Hospital Day: 1    Assessment/Plan   * Small bowel obstruction [facility])  Assessment & Plan  Patient presented to the ED following multiple episodes of vomiting at home x one day  She reports two episodes of diarrhea at home, patient had multiple episodes of vomiting in the ED  · Surgery recommended NGT however attempts unsuccessful after which patient refused  · CT abdomen/pelvis demonstrating concern for distal small bowel obstruction  · Lactic acid 5 2-->1 9 following 2L fluids in ED  · Lipase <7  · Viral panel negative  · Surgery consultation appreciated  · Patient advance to clear liquid diet  · Continue IV fluids until tolerating adequate p o  intake, antiemetics, pain control    Memory loss  Assessment & Plan  · Patient not great historian   · Followed outpatient by neurology for this issue   · Scheduled to follow-up outpatient with neuropsych after ENT hearing evaluation completed    Mixed hyperlipidemia  Assessment & Plan  · Continue statin    Tardive dyskinesia  Assessment & Plan  · Patient with chronic tardive dyskinesia secondary to psychiatric medications  · Continue 1 mg cogentin b i d and 40 mg ingrezza daily    Essential hypertension  Assessment & Plan  · Continue amlodipine     B[facility] II disorder ([facility] )  Assessment & Plan  · Patient mood is stable with tardive dyskinesia at baseline  · Continue home medication regimen  · 50 mg seroquel t i d  · 300 mg lithium h s    · 30 mg paxil b i d  · 60 mg paxil h s   · 20 mg buspar t i d  · 40 mg ingrezza daily  · 7 5 mg remeron h s   · 50 mg hydroxyzine b i d prn    Chronic pain disorder  Assessment & Plan  · Continue 50 mg pregabalin b i d               VTE Pharmacologic Prophylaxis:   Pharmacologic:     Mechanical VTE Prophylaxis in Place:     Patient Centered Rounds: I have performed bedside rounds with nursing staff today  Education and Discussions with Family / Patient: updated patient's daughter, Lopez Burks    Time Spent for Care: More than 50% of total time spent on counseling and coordination of care as described above  Current Length of Stay: 1 day(s)    Current Patient Status: Inpatient   Certification Statement: The patient will continue to require additional inpatient hospital stay due to Small bowel obstruction    Discharge Plan / Estimated Discharge Date: TBD    Code Status: Level 1 - Full Code      Subjective:   Patient seen and examined at bedside, feeling better today, tolerating liquid diet, having multiple loose stools    Objective:     Vitals:   Temp (24hrs), Av 3 °F (36 8 °C), Min:97 4 °F (36 3 °C), Max:99 4 °F (37 4 °C)    Temp:  [97 4 °F (36 3 °C)-99 4 °F (37 4 °C)] 98 2 °F (36 8 °C)  HR:  [73-85] 73  Resp:  [17-20] 17  BP: (107-141)/(69-94) 109/76  SpO2:  [90 %-100 %] 90 %  Body mass index is 30 2 kg/m²  Input and Output Summary (last 24 hours): Intake/Output Summary (Last 24 hours) at 3/25/2023 1231  Last data filed at 3/25/2023 0848  Gross per 24 hour   Intake 1240 ml   Output --   Net 1240 ml       Physical Exam:    Constitutional: Patient is oriented to person, place and time, no acute distress  HEENT:  Normocephalic, atraumatic  Cardiovascular: Normal S1S2, RRR, No murmurs/rubs/gallops appreciated  Pulmonary:  Bilateral air entry, No rhonchi/rales/wheezing appreciated  Abdominal: Soft, Bowel sounds present, Non-tender, Non-distended  Extremities:  No cyanosis, clubbing or edema     Neurological: Awake, alert, repetitive involuntary tongue movements  Skin:  Warm, dry    Additional Data:     Labs:    Results from last 7 days   Lab Units 23  0441 23  0621   WBC Thousand/uL 3 30* 6 99   HEMOGLOBIN g/dL 11 4* 14 6   HEMATOCRIT % 35 8 44 2   PLATELETS Thousands/uL 264 382   NEUTROS PCT %  --  81*   LYMPHS PCT %  --  8*   LYMPHO PCT % 27 --    MONOS PCT %  --  11   MONO PCT % 12  --    EOS PCT % 2 0     Results from last 7 days   Lab Units 03/25/23  0441   POTASSIUM mmol/L 3 6   CHLORIDE mmol/L 113*   CO2 mmol/L 24   BUN mg/dL 29*   CREATININE mg/dL 0 97   CALCIUM mg/dL 7 9*   ALK PHOS U/L 55   ALT U/L 7   AST U/L 10*     Results from last 7 days   Lab Units 03/23/23  2157   INR  0 93        I Have Reviewed All Lab Data Listed Above      Invasive Devices     Peripheral Intravenous Line  Duration           Peripheral IV Left Antecubital -- days    Peripheral IV 03/24/23 Proximal;Right;Ventral (anterior) Forearm 1 day                   Recent Cultures (last 7 days):           Last 24 Hours Medication List:   Current Facility-Administered Medications   Medication Dose Route Frequency Provider Last Rate   • acetaminophen  650 mg Oral Q6H PRN Prabhakar Gray PA-C     • aluminum-magnesium hydroxide-simethicone  30 mL Oral Q6H PRN Prabhakar Gray PA-C     • amLODIPine  10 mg Oral Daily Prabhakar Gray PA-C     • aspirin  81 mg Oral Daily Prabhakar Gray PA-C     • atorvastatin  40 mg Oral After Justin Ortiz PA-C     • benztropine  1 mg Oral BID Prabhakar Gray PA-C     • busPIRone  20 mg Oral TID Prabhakar Gray PA-C     • enoxaparin  40 mg Subcutaneous Q24H [address] Anali Katz MD     • ferrous sulfate  325 mg Oral Daily With Breakfast Prabhakar Gray PA-C     • folic acid  1,187 mcg Oral Daily Prabhakar Gray PA-C     • HYDROmorphone  0 5 mg Intravenous Q4H PRN Prabhakar Gray PA-C     • hydrOXYzine HCL  50 mg Oral TID PRN Prabhakar Gray PA-C     • lithium carbonate  300 mg Oral HS Prabhakar Gray PA-C     • LORazepam  0 5 mg Oral BID PRN Prabhakar Gray PA-C     • mirtazapine  7 5 mg Oral QPM Prabhakar Gray PA-C     • morphine injection  2 mg Intravenous Q4H PRN Prabhakar Gray PA-C     • morphine injection  4 mg Intravenous Q4H PRN Prabhakar Gray PA-C     • ondansetron  4 mg Intravenous Q6H PRN Prabhakar Gray, JACQUI     • pantoprazole  40 mg Intravenous Q24H [facility] Elizabeth Garcia MD     • PARoxetine  30 mg Oral BID Ok Davidson PA-C     • PARoxetine  60 mg Oral HS Ok Davidson PA-C     • prazosin  2 mg Oral HS Ok Davidson PA-C     • pregabalin  50 mg Oral BID Ok Davidson PA-C     • QUEtiapine  50 mg Oral TID Ok Davidson PA-C     • sodium chloride  75 mL/hr Intravenous Continuous Elizabeth Garcia MD 75 mL/hr (03/25/23 1214)   • Valbenazine Tosylate  40 mg Oral Daily Ok Davidson PA-C          Today, Patient Was Seen By: Elizabeth Garcia MD

## 2023-03-26 ENCOUNTER — APPOINTMENT (INPATIENT)
Dept: RADIOLOGY | Facility: HOSPITAL | Age: 58
End: 2023-03-26

## 2023-03-26 LAB
ANION GAP SERPL CALCULATED.3IONS-SCNC: 6 MMOL/L (ref 4–13)
BUN SERPL-MCNC: 16 MG/DL (ref 5–25)
CALCIUM SERPL-MCNC: 8 MG/DL (ref 8.4–10.2)
CHLORIDE SERPL-SCNC: 109 MMOL/L (ref 96–108)
CO2 SERPL-SCNC: 25 MMOL/L (ref 21–32)
CREAT SERPL-MCNC: 0.76 MG/DL (ref 0.6–1.3)
ERYTHROCYTE [DISTWIDTH] IN BLOOD BY AUTOMATED COUNT: 14.6 % (ref 11.6–15.1)
GFR SERPL CREATININE-BSD FRML MDRD: 86 ML/MIN/1.73SQ M
GLUCOSE SERPL-MCNC: 103 MG/DL (ref 65–140)
HCT VFR BLD AUTO: 34.7 % (ref 34.8–46.1)
HGB BLD-MCNC: 11.2 G/DL (ref 11.5–15.4)
MCH RBC QN AUTO: 29.5 PG (ref 26.8–34.3)
MCHC RBC AUTO-ENTMCNC: 32.3 G/DL (ref 31.4–37.4)
MCV RBC AUTO: 91 FL (ref 82–98)
PLATELET # BLD AUTO: 235 THOUSANDS/UL (ref 149–390)
PMV BLD AUTO: 8.9 FL (ref 8.9–12.7)
POTASSIUM SERPL-SCNC: 3.2 MMOL/L (ref 3.5–5.3)
RBC # BLD AUTO: 3.8 MILLION/UL (ref 3.81–5.12)
SODIUM SERPL-SCNC: 140 MMOL/L (ref 135–147)
WBC # BLD AUTO: 2.45 THOUSAND/UL (ref 4.31–10.16)

## 2023-03-26 RX ORDER — POTASSIUM CHLORIDE 20 MEQ/1
40 TABLET, EXTENDED RELEASE ORAL ONCE
Status: COMPLETED | OUTPATIENT
Start: 2023-03-26 | End: 2023-03-26

## 2023-03-26 RX ADMIN — BENZTROPINE MESYLATE 1 MG: 1 TABLET ORAL at 08:04

## 2023-03-26 RX ADMIN — BENZTROPINE MESYLATE 1 MG: 1 TABLET ORAL at 18:21

## 2023-03-26 RX ADMIN — QUETIAPINE FUMARATE 50 MG: 25 TABLET ORAL at 18:21

## 2023-03-26 RX ADMIN — QUETIAPINE FUMARATE 50 MG: 25 TABLET ORAL at 21:15

## 2023-03-26 RX ADMIN — OXYCODONE 5 MG: 5 TABLET ORAL at 21:45

## 2023-03-26 RX ADMIN — PANTOPRAZOLE SODIUM 40 MG: 40 INJECTION, POWDER, FOR SOLUTION INTRAVENOUS at 08:04

## 2023-03-26 RX ADMIN — QUETIAPINE FUMARATE 50 MG: 25 TABLET ORAL at 08:04

## 2023-03-26 RX ADMIN — PREGABALIN 50 MG: 50 CAPSULE ORAL at 08:04

## 2023-03-26 RX ADMIN — ASPIRIN 81 MG: 81 TABLET, COATED ORAL at 08:04

## 2023-03-26 RX ADMIN — BUSPIRONE HYDROCHLORIDE 20 MG: 10 TABLET ORAL at 08:04

## 2023-03-26 RX ADMIN — ENOXAPARIN SODIUM 40 MG: 100 INJECTION SUBCUTANEOUS at 08:04

## 2023-03-26 RX ADMIN — ALUMINUM HYDROXIDE, MAGNESIUM HYDROXIDE, AND DIMETHICONE 30 ML: 200; 20; 200 SUSPENSION ORAL at 08:04

## 2023-03-26 RX ADMIN — BUSPIRONE HYDROCHLORIDE 20 MG: 10 TABLET ORAL at 18:21

## 2023-03-26 RX ADMIN — PRAZOSIN HYDROCHLORIDE 2 MG: 2 CAPSULE ORAL at 21:18

## 2023-03-26 RX ADMIN — FOLIC ACID 1000 MCG: 1 TABLET ORAL at 08:04

## 2023-03-26 RX ADMIN — PAROXETINE 60 MG: 20 TABLET, FILM COATED ORAL at 21:14

## 2023-03-26 RX ADMIN — LITHIUM CARBONATE 300 MG: 300 TABLET, FILM COATED, EXTENDED RELEASE ORAL at 21:15

## 2023-03-26 RX ADMIN — ATORVASTATIN CALCIUM 40 MG: 40 TABLET, FILM COATED ORAL at 18:20

## 2023-03-26 RX ADMIN — FERROUS SULFATE TAB 325 MG (65 MG ELEMENTAL FE) 325 MG: 325 (65 FE) TAB at 06:24

## 2023-03-26 RX ADMIN — PAROXETINE 30 MG: 20 TABLET, FILM COATED ORAL at 18:21

## 2023-03-26 RX ADMIN — AMLODIPINE BESYLATE 10 MG: 10 TABLET ORAL at 08:04

## 2023-03-26 RX ADMIN — PREGABALIN 50 MG: 50 CAPSULE ORAL at 18:20

## 2023-03-26 RX ADMIN — POTASSIUM CHLORIDE 40 MEQ: 1500 TABLET, EXTENDED RELEASE ORAL at 09:41

## 2023-03-26 RX ADMIN — BUSPIRONE HYDROCHLORIDE 20 MG: 10 TABLET ORAL at 21:14

## 2023-03-26 RX ADMIN — MIRTAZAPINE 7.5 MG: 15 TABLET, FILM COATED ORAL at 18:20

## 2023-03-26 RX ADMIN — PAROXETINE 30 MG: 20 TABLET, FILM COATED ORAL at 08:04

## 2023-03-26 NOTE — CASE MANAGEMENT
Case Management Assessment & Discharge Planning Note    Patient name Cortez Sneed  Location [place] [address]  [id number]-* MRN [id number]  : 1965 Date 3/26/2023       Current Admission Date: 3/23/2023  Current Admission Diagnosis:Small bowel obstruction [facility])   Patient Active Problem List    Diagnosis Date Noted   • Small bowel obstruction ([facility] ) 2023   • Memory loss 2023   • Hemiplegia, post-stroke ([facility] ) 2022   • Anemia 2022   • Status post total knee replacement, left 2022   • Seizure-like activity ([facility] ) 2022   • Hypokalemia 2022   • Constipation 03/15/2022   • S/P total knee arthroplasty, right 03/10/2022   • CVA (cerebral vascular accident) ([facility] ) 2022   • Preoperative evaluation to rule out surgical contraindication 2022   • Leukopenia 2021   • Mixed hyperlipidemia 2021   • Medical clearance for psychiatric admission 2021   • History of CVA (cerebrovascular accident) 2021   • Encephalopathy 2021   • Nausea 2021   • Multiple closed fractures of metatarsal bone of right foot 2021   • Chronic back pain 2021   • Arthritis of right knee 10/06/2020   • Dysphagia 2020   • Ambulatory dysfunction 2020   • Status post insertion of spinal cord stimulator 2020   • Superficial bacterial infection of skin 2020   • Scar of back 2020   • Impaired skin integrity associated with surgical incision 2020   • Rash 2020   • Primary osteoarthritis of both knees 2020   • Patellofemoral disorder of both knees 2020   • Tardive dyskinesia 2020   • MIMI (obstructive sleep apnea)    • Complaint related to dreams 2020   • Family history of colorectal cancer 2019   • Encounter for long-term use of opiate analgesic 2019   • Post laminectomy syndrome 10/07/2019   • Lumbar disc disease with radiculopathy 2018   • Spinal stenosis of lumbar region with neurogenic claudication 02/02/2018   • Lumbar radiculopathy 12/08/2017   • Bilateral hip pain 06/19/2017   • Primary localized osteoarthritis of both knees 06/16/2017   • Chronic pain disorder 02/28/2017   • Opioid dependence ([facility] ) 02/28/2017   • Sacroiliitis ([facility] ) 02/28/2017   • Lumbar spondylosis 10/31/2016   • Osteoarthritis of both hips 10/31/2016   • Generalized anxiety disorder 10/26/2016   • Major depressive disorder, recurrent episode, moderate degree ([facility] ) 09/08/2016   • Right knee pain 06/06/2016   • Recent unexplained weight loss 06/06/2016   • Fatigue 10/26/2015   • Bipolar II disorder ([facility] ) 06/18/2015   • Panic disorder without agoraphobia 06/18/2015   • Post traumatic stress disorder 06/18/2015   • Left hip pain 07/25/2014   • Intractable low back pain 06/16/2014   • Tremor 06/12/2014   • Migraine 05/27/2014   • Esophageal reflux 05/01/2014   • Cognitive disorder 04/18/2014   • Female pelvic pain 10/30/2013   • Pain in joint, shoulder region 10/28/2013   • Overactive bladder 09/26/2013   • Osteoarthritis of knee 02/20/2013   • Insomnia 01/31/2013   • History of hypokalemia 01/03/2013   • Urinary incontinence 09/24/2012   • Vitamin D deficiency 09/18/2012   • Fibromyalgia 09/14/2012   • Essential hypertension 09/14/2012      LOS (days): 2  Geometric Mean LOS (GMLOS) (days): 3 00  Days to GMLOS:0 5     OBJECTIVE:    Risk of Unplanned Readmission Score: 30 81         Current admission status: Inpatient       Preferred Pharmacy:   [address] [place] [address]  Phone: [phone] Fax: [phone]    [id number] [address] [facility] [address]  Phone: [phone] Fax: [address] [address]  Phone: [phone] Fax: [phone]    [facility],[address], [place] - [address] [place] [address] [facility]  Phone: [phone] Fax: [address], [address] [facility]  [address] [facility] [address]  Phone: [phone] Fax: [phone]    Primary Care Provider: Raymond Tabor DO    Primary Insurance: [address] [facility] [facility]  Secondary Insurance: Seth Reemaikshore    ASSESSMENT:  [id number] [place] Representative - Daughter   Primary Phone: [phone] (Mobile)  Home Phone: [phone]               Patient Information  Admitted from[de-identified] Home  Mental Status: Alert  During Assessment patient was accompanied by: Not accompanied during assessment (VM left Fransisca Odor (Dtr) [phone] Isabel Wallis))  Assessment information provided by[de-identified] Patient  Primary Caregiver: Family  Caregiver's Name[de-identified] Fransisca Odor (Dtr) [phone] Isabel Wallis)  Caregiver's Relationship to Patient[de-identified] Family Member  Support Systems: Daughter, Son, Family members  [place] of Residence: iTherX do you live in?: Nye Suraj entry access options   Select all that apply : Stairs  Number of steps to enter home : One Flight  Do the steps have railings?: Yes  Type of Current Residence: Apartment  Floor Level: 2  Upon entering residence, is there a bedroom on the main floor (no further steps)?: Yes  Upon entering residence, is there a bathroom on the main floor (no further steps)?: Yes  In the last 12 months, was there a time when you were not able to pay the mortgage or rent on time?: No  In the last 12 months, how many places have you lived?: 1  In the last 12 months, was there a time when you did not have a steady place to sleep or slept in a shelter (including now)?: No  Homeless/housing insecurity resource given?: N/A  Living Arrangements: Lives w/ Daughter, Lives w/ Extended Family    Activities of Daily Living Prior to Admission  Functional Status: Assistance  Completes ADLs independently?: No  Level of ADL dependence: Assistance  Ambulates independently?: No  Level of ambulatory dependence: Assistance  Does patient use assisted devices?: Yes  Assisted Devices (DME) used: Lourdes Anali, Wheelchair, Bedside Commode  Does patient currently own DME?: Yes  What DME does the patient currently own?:  (Same)  Does patient have a history of Outpatient Therapy (PT/OT)?: Yes  Does the patient have a history of Short-Term Rehab?: Yes ([facility])  Does patient have a history of HHC?: Yes ([facility])  Does patient currently have [facility]?: No    Patient Information Continued  Income Source: SSI/SSD  Does patient have prescription coverage?: Yes  Within the past 12 months, you worried that your food would run out before you got the money to buy more : Never true  Within the past 12 months, the food you bought just didn't last and you didn't have money to get more : Never true  Food insecurity resource given?: N/A  Does patient have a history of Mental Health Diagnosis?: Yes ([employer])  Is patient receiving treatment for mental health?:  (No)  Has patient received inpatient treatment related to mental health in the last 2 years?: Yes ([facility] July 2021-August 2021)     Means of Transportation  Means of Transport to Appts[de-identified] Family transport  In the past 12 months, has lack of transportation kept you from medical appointments or from getting medications?: No  In the past 12 months, has lack of transportation kept you from meetings, work, or from getting things needed for daily living?: No  Was application for public transport provided?: N/A        DISCHARGE DETAILS:    Discharge planning discussed with[de-identified] Patient and Chart Review   LV for Tunde Rodriguez(Dtr) [phone] (M)  Freedom of Choice: Yes    CM contacted family/caregiver?: Yes ( left for Tunde Rodriguez(Dtr) [phone] (M))  Contacts  Patient Contacts: Miguel Rodriguez(r) [phone] (M)  Relationship to Patient[de-identified] Family  Contact Method: Phone (LVM)  Reason/Outcome: Discharge Planning, Continuity of Care, Emergency Contact    Additional Comments: CM completed chart review second to CM encounter w/ Pt at bedside  Pt was able to provide information for her living situation and identified who assists her at home Pts Son In [place]  CM left vm for Dtr Cyrie to complete assessment and discuss therapy recommendation for STR  CM remains available to continue discharge planning

## 2023-03-26 NOTE — PLAN OF CARE
Problem: Potential for Falls  Goal: Patient will remain free of falls  Description: INTERVENTIONS:  - Educate patient/family on patient safety including physical limitations  - Instruct patient to call for assistance with activity   - Consult OT/PT to assist with strengthening/mobility   - Keep Call bell within reach  - Keep bed low and locked with side rails adjusted as appropriate  - Keep care items and personal belongings within reach  - Initiate and maintain comfort rounds  - Make Fall Risk Sign visible to staff  - Apply yellow socks and bracelet for high fall risk patients  - Consider moving patient to room near nurses station  Outcome: Progressing     Problem: PAIN - ADULT  Goal: Verbalizes/displays adequate comfort level or baseline comfort level  Description: Interventions:  - Encourage patient to monitor pain and request assistance  - Assess pain using appropriate pain scale  - Administer analgesics based on type and severity of pain and evaluate response  - Implement non-pharmacological measures as appropriate and evaluate response  - Consider cultural and social influences on pain and pain management  - Notify physician/advanced practitioner if interventions unsuccessful or patient reports new pain  Outcome: Progressing     Problem: INFECTION - ADULT  Goal: Absence or prevention of progression during hospitalization  Description: INTERVENTIONS:  - Assess and monitor for signs and symptoms of infection  - Monitor lab/diagnostic results  - Monitor all insertion sites, i e  indwelling lines, tubes, and drains  - Monitor endotracheal if appropriate and nasal secretions for changes in amount and color  - [place] appropriate cooling/warming therapies per order  - Administer medications as ordered  - Instruct and encourage patient and family to use good hand hygiene technique  - Identify and instruct in appropriate isolation precautions for identified infection/condition  Outcome: Progressing  Goal: Absence of fever/infection during neutropenic period  Description: INTERVENTIONS:  - Monitor WBC    Outcome: Progressing     Problem: SAFETY ADULT  Goal: Patient will remain free of falls  Description: INTERVENTIONS:  - Educate patient/family on patient safety including physical limitations  - Instruct patient to call for assistance with activity   - Consult OT/PT to assist with strengthening/mobility   - Keep Call bell within reach  - Keep bed low and locked with side rails adjusted as appropriate  - Keep care items and personal belongings within reach  - Initiate and maintain comfort rounds  - Make Fall Risk Sign visible to staff  - Apply yellow socks and bracelet for high fall risk patients  - Consider moving patient to room near nurses station  Outcome: Progressing  Goal: Maintain or return to baseline ADL function  Description: INTERVENTIONS:  -  Assess patient's ability to carry out ADLs; assess patient's baseline for ADL function and identify physical deficits which impact ability to perform ADLs (bathing, care of mouth/teeth, toileting, grooming, dressing, etc )  - Assess/evaluate cause of self-care deficits   - Assess range of motion  - Assess patient's mobility; develop plan if impaired  - Assess patient's need for assistive devices and provide as appropriate  - Encourage maximum independence but intervene and supervise when necessary  - Involve family in performance of ADLs  - Assess for home care needs following discharge   - Consider OT consult to assist with ADL evaluation and planning for discharge  - Provide patient education as appropriate  Outcome: Progressing  Goal: Maintains/Returns to pre admission functional level  Description: INTERVENTIONS:  - Perform BMAT or MOVE assessment daily    - Set and communicate daily mobility goal to care team and patient/family/caregiver     - Collaborate with rehabilitation services on mobility goals if consulted  - Out of bed for toileting  - Record patient progress and toleration of activity level   Outcome: Progressing     Problem: DISCHARGE PLANNING  Goal: Discharge to home or other facility with appropriate resources  Description: INTERVENTIONS:  - Identify barriers to discharge w/patient and caregiver  - Arrange for needed discharge resources and transportation as appropriate  - Identify discharge learning needs (meds, wound care, etc )  - Arrange for interpretive services to assist at discharge as needed  - Refer to Case Management Department for coordinating discharge planning if the patient needs post-hospital services based on physician/advanced practitioner order or complex needs related to functional status, cognitive ability, or social support system  Outcome: Progressing     Problem: Knowledge Deficit  Goal: Patient/family/caregiver demonstrates understanding of disease process, treatment plan, medications, and discharge instructions  Description: Complete learning assessment and assess knowledge base    Interventions:  - Provide teaching at level of understanding  - Provide teaching via preferred learning methods  Outcome: Progressing     Problem: MOBILITY - ADULT  Goal: Maintain or return to baseline ADL function  Description: INTERVENTIONS:  -  Assess patient's ability to carry out ADLs; assess patient's baseline for ADL function and identify physical deficits which impact ability to perform ADLs (bathing, care of mouth/teeth, toileting, grooming, dressing, etc )  - Assess/evaluate cause of self-care deficits   - Assess range of motion  - Assess patient's mobility; develop plan if impaired  - Assess patient's need for assistive devices and provide as appropriate  - Encourage maximum independence but intervene and supervise when necessary  - Involve family in performance of ADLs  - Assess for home care needs following discharge   - Consider OT consult to assist with ADL evaluation and planning for discharge  - Provide patient education as appropriate  Outcome: Progressing  Goal: Maintains/Returns to pre admission functional level  Description: INTERVENTIONS:  - Perform BMAT or MOVE assessment daily    - Set and communicate daily mobility goal to care team and patient/family/caregiver     - Collaborate with rehabilitation services on mobility goals if consulted  - Out of bed for toileting  - Record patient progress and toleration of activity level   Outcome: Progressing

## 2023-03-26 NOTE — ASSESSMENT & PLAN NOTE
Patient presented to the ED following multiple episodes of vomiting at home x one day  She reports two episodes of diarrhea at home, patient had multiple episodes of vomiting in the ED      · Surgery recommended NGT however attempts unsuccessful after which patient refused  · CT abdomen/pelvis demonstrating concern for distal small bowel obstruction  · Lactic acid 5 2-->1 9 following 2L fluids in ED  · Lipase <7  · Viral panel negative  · Surgery consultation appreciated  · Patient was initially advanced to clear liquid diet on 3/25/2023 however had some vomiting overnight, diet was changed to n p o   ·  KUB  · Continue IV fluids until tolerating adequate p o  intake, antiemetics, pain control

## 2023-03-26 NOTE — PROGRESS NOTES
"Progress Note - General Surgery   Samantha Andres 59 y o  female MRN: [id number]  Unit/Bed#: E5 -01 Encounter: [id number]    Assessment:  56yoF p/w enteritis vs partial SBO  Started on clear liquid diet yesterday, per nursing patient had a lot of clears  Patient now with persistent nausea/vomiting, and diarrhea  AVSS    Multiple loose bowel movements    Plan:  -We will back diet back down to n p o  this morning  Patient is not clinically obstructed as she is having multiple loose bowel movements  Picture is more consistent with enteritis with ileus   -We will obtain a KUB this morning  -Continue IV fluids  -Pain/nausea control as needed   -Encourage ambulation  -DVT prophylaxis  -Care per primary team    Subjective/Objective   Subjective:   Multiple loose bowel movements along with nausea and vomiting  Denies any fevers or chills  No chest pain or shortness of breath  Complaining of right leg pain  Objective:     Blood pressure 133/80, pulse 95, temperature 98 8 °F (37 1 °C), resp  rate 14, height 5' 1\" (1 549 m), weight 72 5 kg (159 lb 13 3 oz), SpO2 90 %, not currently breastfeeding  ,Body mass index is 30 2 kg/m²  No intake or output data in the 24 hours ending 03/26/23 0856    Invasive Devices     Peripheral Intravenous Line  Duration           Peripheral IV Left Antecubital -- days    Peripheral IV 03/24/23 Proximal;Right;Ventral (anterior) Forearm 2 days                Physical Exam:   General: NAD  HENT: NCAT MMM  Neck: supple, no JVD  CV: nl rate  Lungs: nl wob  No resp distress  ABD: Soft, mild epigastric tenderness, mild to moderate distention  Extrem: No CCE  Neuro: AAOx3      Lab, Imaging and other studies:  I have personally reviewed pertinent lab results    , CBC:   Lab Results   Component Value Date    WBC 2 45 (L) 03/26/2023    HGB 11 2 (L) 03/26/2023    HCT 34 7 (L) 03/26/2023    MCV 91 03/26/2023     03/26/2023    MCH 29 5 03/26/2023    MCHC 32 3 03/26/2023    RDW 14 6 " 03/26/2023    MPV 8 9 03/26/2023   , CMP:   Lab Results   Component Value Date    SODIUM 140 03/26/2023    K 3 2 (L) 03/26/2023     (H) 03/26/2023    CO2 25 03/26/2023    BUN 16 03/26/2023    CREATININE 0 76 03/26/2023    CALCIUM 8 0 (L) 03/26/2023    EGFR 86 03/26/2023     VTE Pharmacologic Prophylaxis: Sequential compression device (Venodyne)   VTE Mechanical Prophylaxis: sequential compression device

## 2023-03-26 NOTE — PROGRESS NOTES
[address] [place] [address]  Progress Note  Name: Jose Ramon Hung  MRN: [id number]  Unit/Bed#: E5 -01 I Date of Admission: 3/23/2023   Date of Service: 3/26/2023 I Hospital Day: 2    Assessment/Plan   * Small bowel obstruction [facility])  Assessment & Plan  Patient presented to the ED following multiple episodes of vomiting at home x one day  She reports two episodes of diarrhea at home, patient had multiple episodes of vomiting in the ED  · Surgery recommended NGT however attempts unsuccessful after which patient refused  · CT abdomen/pelvis demonstrating concern for distal small bowel obstruction  · Lactic acid 5 2-->1 9 following 2L fluids in ED  · Lipase <7  · Viral panel negative  · Surgery consultation appreciated  · Patient was initially advanced to clear liquid diet on 3/25/2023 however had some vomiting overnight, diet was changed to n p o   ·  KUB  · Continue IV fluids until tolerating adequate p o  intake, antiemetics, pain control    Memory loss  Assessment & Plan  · Patient not great historian   · Followed outpatient by neurology for this issue   · Scheduled to follow-up outpatient with neuropsych after ENT hearing evaluation completed    Mixed hyperlipidemia  Assessment & Plan  · Continue statin    Tardive dyskinesia  Assessment & Plan  · Patient with chronic tardive dyskinesia secondary to psychiatric medications  · Continue 1 mg cogentin b i d and 40 mg ingrezza daily    Essential hypertension  Assessment & Plan  · Continue amlodipine     Bipolar II disorder ([facility] [address] )  Assessment & Plan  · Patient mood is stable with tardive dyskinesia at baseline  · Continue home medication regimen  · 50 mg seroquel t i d  · 300 mg lithium h s    · 30 mg paxil b i d  · 60 mg paxil h s   · 20 mg buspar t i d  · 40 mg ingrezza daily  · 7 5 mg remeron h s   · 50 mg hydroxyzine b i d prn    Chronic pain disorder  Assessment & Plan  · Continue 50 mg pregabalin b i d           VTE Pharmacologic Prophylaxis: Pharmacologic: lovenox    Patient Centered Rounds: I have performed bedside rounds with nursing staff today  Discussions with Specialists or Other Care Team Provider: General surgery team    Education and Discussions with Family / Patient: Updated daughter, Shelli Munoz    Time Spent for Care: More than 50% of total time spent on counseling and coordination of care as described above  Current Length of Stay: 2 day(s)    Current Patient Status: Inpatient   Certification Statement: The patient will continue to require additional inpatient hospital stay due to ilues    Discharge Plan / Estimated Discharge Date: TBD    Code Status: Level 1 - Full Code      Subjective:   Patient seen and examined at bedside, sitting in bed, currently denies any abdominal pain however states she feels her stomach is bloated    Objective:     Vitals:   Temp (24hrs), Av 5 °F (36 9 °C), Min:98 1 °F (36 7 °C), Max:98 8 °F (37 1 °C)    Temp:  [98 1 °F (36 7 °C)-98 8 °F (37 1 °C)] 98 8 °F (37 1 °C)  HR:  [71-95] 95  Resp:  [14-16] 14  BP: (114-133)/(67-80) 133/80  SpO2:  [90 %-96 %] 90 %  Body mass index is 30 2 kg/m²  Input and Output Summary (last 24 hours): Intake/Output Summary (Last 24 hours) at 3/26/2023 1255  Last data filed at 3/26/2023 1213  Gross per 24 hour   Intake 0 ml   Output --   Net 0 ml       Physical Exam:    Constitutional: Patient is in no acute distress  HEENT:  Normocephalic, atraumatic  Cardiovascular: Normal S1S2, RRR, No murmurs/rubs/gallops appreciated  Pulmonary:  Bilateral air entry, No rhonchi/rales/wheezing appreciated  Abdominal: Soft, distended  Extremities:  No cyanosis, clubbing or edema     Neurological: Awake, alert  Skin:  Warm, dry    Additional Data:     Labs:    Results from last 7 days   Lab Units 23  0540 23  0441 23  0621   WBC Thousand/uL 2 45* 3 30* 6 99   HEMOGLOBIN g/dL 11 2* 11 4* 14 6   HEMATOCRIT % 34 7* 35 8 44 2   PLATELETS Thousands/uL 235 264 382   NEUTROS PCT %  --   --  81*   LYMPHS PCT %  --   --  8*   LYMPHO PCT %  --  27  --    MONOS PCT %  --   --  11   MONO PCT %  --  12  --    EOS PCT %  --  2 0     Results from last 7 days   Lab Units 03/26/23  0540 03/25/23  0441   POTASSIUM mmol/L 3 2* 3 6   CHLORIDE mmol/L 109* 113*   CO2 mmol/L 25 24   BUN mg/dL 16 29*   CREATININE mg/dL 0 76 0 97   CALCIUM mg/dL 8 0* 7 9*   ALK PHOS U/L  --  55   ALT U/L  --  7   AST U/L  --  10*     Results from last 7 days   Lab Units 03/23/23  2157   INR  0 93        I Have Reviewed All Lab Data Listed Above      Invasive Devices     Peripheral Intravenous Line  Duration           Peripheral IV Left Antecubital -- days    Peripheral IV 03/24/23 Proximal;Right;Ventral (anterior) Forearm 2 days                     Recent Cultures (last 7 days):           Last 24 Hours Medication List:   Current Facility-Administered Medications   Medication Dose Route Frequency Provider Last Rate   • acetaminophen  650 mg Oral Q6H PRN Kareem Kim PA-C     • aluminum-magnesium hydroxide-simethicone  30 mL Oral Q6H PRN Umaan JACQUI Kim     • amLODIPine  10 mg Oral Daily Kareem Kim PA-C     • aspirin  81 mg Oral Daily Kareem Kim PA-C     • atorvastatin  40 mg Oral After Izabel Sebastian PA-C     • benztropine  1 mg Oral BID Kareem Kim PA-C     • busPIRone  20 mg Oral TID Kareem Kim PA-C     • enoxaparin  40 mg Subcutaneous Q24H [facility] Eduar Mireles MD     • ferrous sulfate  325 mg Oral Daily With Breakfast Kareem Kim PA-C     • folic acid  4,936 mcg Oral Daily Kareem Kim PA-C     • HYDROmorphone  0 5 mg Intravenous Q4H PRN Eduar Mireles MD     • hydrOXYzine HCL  50 mg Oral TID PRN Kareem Kim PA-C     • lithium carbonate  300 mg Oral HS Kareem Kim PA-C     • LORazepam  0 5 mg Oral BID PRN Kareem Kim PA-C     • mirtazapine  7 5 mg Oral QPM Kareem Kim PA-C     • ondansetron  4 mg Intravenous Q6H PRN Kareem Kim PA-C • oxyCODONE  5 mg Oral Q4H PRN Nika Sharma MD     • pantoprazole  40 mg Intravenous Q24H [address] Nika Sharma MD     • PARoxetine  30 mg Oral BID Jose F Patel PA-C     • PARoxetine  60 mg Oral HS Jose F Patel PA-C     • prazosin  2 mg Oral HS Jose F Patel PA-C     • pregabalin  50 mg Oral BID Jose F Patel PA-C     • QUEtiapine  50 mg Oral TID Jose F Patel PA-C     • sodium chloride  75 mL/hr Intravenous Continuous Nika Sharma MD 75 mL/hr (03/25/23 1214)   • Valbenazine Tosylate  40 mg Oral Daily Jose F Patel PA-C          Today, Patient Was Seen By: Nika Sharma MD

## 2023-03-27 LAB
ANION GAP SERPL CALCULATED.3IONS-SCNC: 6 MMOL/L (ref 4–13)
BUN SERPL-MCNC: 9 MG/DL (ref 5–25)
CALCIUM SERPL-MCNC: 7.9 MG/DL (ref 8.4–10.2)
CHLORIDE SERPL-SCNC: 112 MMOL/L (ref 96–108)
CO2 SERPL-SCNC: 26 MMOL/L (ref 21–32)
CREAT SERPL-MCNC: 0.7 MG/DL (ref 0.6–1.3)
GFR SERPL CREATININE-BSD FRML MDRD: 95 ML/MIN/1.73SQ M
GLUCOSE SERPL-MCNC: 81 MG/DL (ref 65–140)
HOLD SPECIMEN: NORMAL
POTASSIUM SERPL-SCNC: 3.3 MMOL/L (ref 3.5–5.3)
SODIUM SERPL-SCNC: 144 MMOL/L (ref 135–147)

## 2023-03-27 RX ORDER — POTASSIUM CHLORIDE 14.9 MG/ML
20 INJECTION INTRAVENOUS ONCE
Status: DISCONTINUED | OUTPATIENT
Start: 2023-03-27 | End: 2023-03-27

## 2023-03-27 RX ORDER — POTASSIUM CHLORIDE 14.9 MG/ML
20 INJECTION INTRAVENOUS ONCE
Status: COMPLETED | OUTPATIENT
Start: 2023-03-27 | End: 2023-03-27

## 2023-03-27 RX ADMIN — ASPIRIN 81 MG: 81 TABLET, COATED ORAL at 10:06

## 2023-03-27 RX ADMIN — FOLIC ACID 1000 MCG: 1 TABLET ORAL at 10:00

## 2023-03-27 RX ADMIN — PANTOPRAZOLE SODIUM 40 MG: 40 INJECTION, POWDER, FOR SOLUTION INTRAVENOUS at 10:00

## 2023-03-27 RX ADMIN — BUSPIRONE HYDROCHLORIDE 20 MG: 10 TABLET ORAL at 10:00

## 2023-03-27 RX ADMIN — SODIUM CHLORIDE 75 ML/HR: 0.9 INJECTION, SOLUTION INTRAVENOUS at 07:28

## 2023-03-27 RX ADMIN — QUETIAPINE FUMARATE 50 MG: 25 TABLET ORAL at 10:00

## 2023-03-27 RX ADMIN — POTASSIUM CHLORIDE 20 MEQ: 14.9 INJECTION, SOLUTION INTRAVENOUS at 12:39

## 2023-03-27 RX ADMIN — BENZTROPINE MESYLATE 1 MG: 1 TABLET ORAL at 10:00

## 2023-03-27 RX ADMIN — AMLODIPINE BESYLATE 10 MG: 10 TABLET ORAL at 10:00

## 2023-03-27 RX ADMIN — PRAZOSIN HYDROCHLORIDE 2 MG: 2 CAPSULE ORAL at 21:17

## 2023-03-27 RX ADMIN — FERROUS SULFATE TAB 325 MG (65 MG ELEMENTAL FE) 325 MG: 325 (65 FE) TAB at 10:00

## 2023-03-27 RX ADMIN — PAROXETINE 30 MG: 20 TABLET, FILM COATED ORAL at 17:18

## 2023-03-27 RX ADMIN — SODIUM CHLORIDE 75 ML/HR: 0.9 INJECTION, SOLUTION INTRAVENOUS at 21:21

## 2023-03-27 RX ADMIN — PREGABALIN 50 MG: 50 CAPSULE ORAL at 10:00

## 2023-03-27 RX ADMIN — LITHIUM CARBONATE 300 MG: 300 TABLET, FILM COATED, EXTENDED RELEASE ORAL at 21:16

## 2023-03-27 RX ADMIN — BUSPIRONE HYDROCHLORIDE 20 MG: 10 TABLET ORAL at 15:50

## 2023-03-27 RX ADMIN — ATORVASTATIN CALCIUM 40 MG: 40 TABLET, FILM COATED ORAL at 17:18

## 2023-03-27 RX ADMIN — HYDROMORPHONE HYDROCHLORIDE 0.5 MG: 1 INJECTION, SOLUTION INTRAMUSCULAR; INTRAVENOUS; SUBCUTANEOUS at 00:42

## 2023-03-27 RX ADMIN — PREGABALIN 50 MG: 50 CAPSULE ORAL at 17:18

## 2023-03-27 RX ADMIN — MIRTAZAPINE 7.5 MG: 15 TABLET, FILM COATED ORAL at 17:18

## 2023-03-27 RX ADMIN — QUETIAPINE FUMARATE 50 MG: 25 TABLET ORAL at 15:50

## 2023-03-27 RX ADMIN — PAROXETINE 60 MG: 20 TABLET, FILM COATED ORAL at 21:18

## 2023-03-27 RX ADMIN — BUSPIRONE HYDROCHLORIDE 20 MG: 10 TABLET ORAL at 21:17

## 2023-03-27 RX ADMIN — QUETIAPINE FUMARATE 50 MG: 25 TABLET ORAL at 21:17

## 2023-03-27 RX ADMIN — OXYCODONE 5 MG: 5 TABLET ORAL at 12:47

## 2023-03-27 RX ADMIN — BENZTROPINE MESYLATE 1 MG: 1 TABLET ORAL at 17:18

## 2023-03-27 RX ADMIN — ENOXAPARIN SODIUM 40 MG: 100 INJECTION SUBCUTANEOUS at 10:00

## 2023-03-27 RX ADMIN — PAROXETINE 30 MG: 20 TABLET, FILM COATED ORAL at 10:00

## 2023-03-27 RX ADMIN — POTASSIUM CHLORIDE 20 MEQ: 14.9 INJECTION, SOLUTION INTRAVENOUS at 10:13

## 2023-03-27 NOTE — PROGRESS NOTES
"Progress Note - General Surgery   Samantha Garvey 59 y o  female MRN: [id number]  Unit/Bed#: E5 -01 Encounter: [id number]    Assessment:  56yoF p/w enteritis vs partial SBO     Vitals stable, afebrile  Creatinine 0 7     UOP multiple  Multiple loose bowel movements    Plan:  -CLD, advance as tolerated  -Christopher@[email or web address]  -Pain control  -Encourage ambulation  -Lovenox  -Care per primary team    Subjective/Objective   Subjective:   Patient doing better this morning, reports some continued abdominal pain, nausea and emesis resolved tolerating clear liquid diets, still having multiple loose bowel movements, voiding without issues  Objective:     Blood pressure 112/74, pulse 63, temperature 97 9 °F (36 6 °C), resp  rate 14, height 5' 1\" (1 549 m), weight 72 5 kg (159 lb 13 3 oz), SpO2 96 %, not currently breastfeeding  ,Body mass index is 30 2 kg/m²  Intake/Output Summary (Last 24 hours) at 3/27/2023 0756  Last data filed at 3/26/2023 1213  Gross per 24 hour   Intake 0 ml   Output --   Net 0 ml       Invasive Devices     Peripheral Intravenous Line  Duration           Peripheral IV 03/27/23 Dorsal (posterior); Left Hand <1 day                Physical Exam:   General: NAD  Skin: Warm, dry, anicteric  HEENT: Normocephalic, atraumatic  CV: RRR, no m/r/g  Pulm: CTA b/l, no inc WOB  Abd: Soft, mildly distended, minimally tender to palpation in the lower abdomen  MSK: Symmetric, no edema, no tenderness, no deformity  Neuro: AOx3, GCS 15    Lab, Imaging and other studies:  I have personally reviewed pertinent lab results    , CBC: No results found for: WBC, HGB, HCT, MCV, PLT, ADJUSTEDWBC, MCH, MCHC, RDW, MPV, NRBC, CMP:   Lab Results   Component Value Date    SODIUM 144 03/27/2023    K 3 3 (L) 03/27/2023     (H) 03/27/2023    CO2 26 03/27/2023    BUN 9 03/27/2023    CREATININE 0 70 03/27/2023    CALCIUM 7 9 (L) 03/27/2023    EGFR 95 03/27/2023     VTE Pharmacologic Prophylaxis: Sequential compression device (Venodyne)  " and Enoxaparin (Lovenox)  VTE Mechanical Prophylaxis: sequential compression device

## 2023-03-27 NOTE — PROGRESS NOTES
[address] [place] [address]  Progress Note  Name: Cookie Hammond  MRN: [id number]  Unit/Bed#: E5 -01 I Date of Admission: 3/23/2023   Date of Service: 3/27/2023 I Hospital Day: 3    Assessment/Plan   * Small bowel obstruction [facility])  Assessment & Plan  Patient presented to the ED following multiple episodes of vomiting at home x one day  She reports two episodes of diarrhea at home, patient had multiple episodes of vomiting in the ED  · CT abdomen/pelvis showed mid to distal high-grade small bowel obstruction possibly due to adhesions  · Lactic acid 5 2-->1 9 following 2L fluids in ED  · Surgery consultation appreciated  · Surgery recommended NGT however attempts unsuccessful after which patient refused  · Patient was initially advanced to clear liquid diet on 3/25/2023 however had some vomiting overnight, diet was changed to n p o  repeat KUB 3/26 showed small bowel obstruction without change  · Patient had a bowel movement this morning, abdomen is soft, positive bowel sounds  · Per surgery continue clear liquid diet  · IV fluid  · Pain control    Memory loss  Assessment & Plan  · Patient not great historian   · Followed outpatient by neurology for this issue   · Scheduled to follow-up outpatient with neuropsych after ENT hearing evaluation completed    Mixed hyperlipidemia  Assessment & Plan  · Continue statin    Tardive dyskinesia  Assessment & Plan  · Patient with chronic tardive dyskinesia secondary to psychiatric medications  · Continue 1 mg cogentin b i d and 40 mg ingrezza daily    Essential hypertension  Assessment & Plan  · Continue amlodipine     Bipolar II disorder ([facility] [address] )  Assessment & Plan  · Patient mood is stable with tardive dyskinesia at baseline  · Continue home medication regimen  · 50 mg seroquel t i d  · 300 mg lithium h s    · 30 mg paxil b i d  · 60 mg paxil h s   · 20 mg buspar t i d  · 40 mg ingrezza daily  · 7 5 mg remeron h s   · 50 mg hydroxyzine b i d prn    Chronic pain disorder  Assessment & Plan  · Continue 50 mg pregabalin b i d             VTE Pharmacologic Prophylaxis: VTE Score: 2 Lovenox    Patient Centered Rounds: I performed bedside rounds with nursing staff today  Discussions with Specialists or Other Care Team Provider: Nursing, case management    Education and Discussions with Family / Patient: Updated  (daughter) via phone  Total Time Spent on Date of Encounter in care of patient: 35 minutes This time was spent on one or more of the following: performing physical exam; counseling and coordination of care; obtaining or reviewing history; documenting in the medical record; reviewing/ordering tests, medications or procedures; communicating with other healthcare professionals and discussing with patient's family/caregivers  Current Length of Stay: 3 day(s)  Current Patient Status: Inpatient   Certification Statement: The patient will continue to require additional inpatient hospital stay due to Small bowel obstruction  Discharge Plan: Anticipate discharge in 48 hrs to discharge location to be determined pending rehab evaluations  Code Status: Level 1 - Full Code    Subjective:   Patient was seen and evaluated bedside, abdominal pain improved, had a bowel movement this morning, tolerating clear liquid diet    Objective:     Vitals:   Temp (24hrs), Av 4 °F (36 9 °C), Min:97 9 °F (36 6 °C), Max:98 7 °F (37 1 °C)    Temp:  [97 9 °F (36 6 °C)-98 7 °F (37 1 °C)] 98 7 °F (37 1 °C)  HR:  [63-83] 66  Resp:  [16-17] 16  BP: (112-123)/(71-74) 123/71  SpO2:  [94 %-98 %] 94 %  Body mass index is 30 2 kg/m²  Input and Output Summary (last 24 hours): Intake/Output Summary (Last 24 hours) at 3/27/2023 1807  Last data filed at 3/27/2023 1607  Gross per 24 hour   Intake 358 ml   Output 1750 ml   Net -1392 ml       Physical Exam:   Physical Exam  Constitutional:       General: She is not in acute distress  HENT:      Head: Atraumatic  Cardiovascular:      Rate and Rhythm: Normal rate and regular rhythm  Heart sounds: No murmur heard  No friction rub  No gallop  Pulmonary:      Effort: Pulmonary effort is normal  No respiratory distress  Breath sounds: Normal breath sounds  No wheezing  Abdominal:      General: Bowel sounds are normal  There is no distension  Palpations: Abdomen is soft  Comments: Mild diffuse tenderness to deep palpation   Musculoskeletal:         General: No swelling  Cervical back: Neck supple  Skin:     General: Skin is warm and dry  Neurological:      Mental Status: She is alert  Comments: Tongue rolling   Psychiatric:         Mood and Affect: Mood normal         Additional Data:     Labs:  Results from last 7 days   Lab Units 03/26/23 0540 03/25/23 0441 03/24/23  0621   WBC Thousand/uL 2 45* 3 30* 6 99   HEMOGLOBIN g/dL 11 2* 11 4* 14 6   HEMATOCRIT % 34 7* 35 8 44 2   PLATELETS Thousands/uL 235 264 382   BANDS PCT %  --  5  --    NEUTROS PCT %  --   --  81*   LYMPHS PCT %  --   --  8*   LYMPHO PCT %  --  27  --    MONOS PCT %  --   --  11   MONO PCT %  --  12  --    EOS PCT %  --  2 0     Results from last 7 days   Lab Units 03/27/23  0413 03/26/23 0540 03/25/23  0441   SODIUM mmol/L 144   < > 144   POTASSIUM mmol/L 3 3*   < > 3 6   CHLORIDE mmol/L 112*   < > 113*   CO2 mmol/L 26   < > 24   BUN mg/dL 9   < > 29*   CREATININE mg/dL 0 70   < > 0 97   ANION GAP mmol/L 6   < > 7   CALCIUM mg/dL 7 9*   < > 7 9*   ALBUMIN g/dL  --   --  3 5   TOTAL BILIRUBIN mg/dL  --   --  1 44*   ALK PHOS U/L  --   --  55   ALT U/L  --   --  7   AST U/L  --   --  10*   GLUCOSE RANDOM mg/dL 81   < > 110    < > = values in this interval not displayed       Results from last 7 days   Lab Units 03/23/23 2157   INR  0 93             Results from last 7 days   Lab Units 03/24/23  0055 03/23/23 2157   LACTIC ACID mmol/L 1 9 5 2*       Lines/Drains:  Invasive Devices     Peripheral Intravenous Line Duration           Peripheral IV 03/27/23 Dorsal (posterior); Left Hand <1 day                      Imaging: Reviewed radiology reports from this admission including: abdominal/pelvic CT and xray(s)    Recent Cultures (last 7 days):         Last 24 Hours Medication List:   Current Facility-Administered Medications   Medication Dose Route Frequency Provider Last Rate   • acetaminophen  650 mg Oral Q6H PRN Malachi Umaña, PA-C     • aluminum-magnesium hydroxide-simethicone  30 mL Oral Q6H PRN Malachi Umaña, PA-C     • amLODIPine  10 mg Oral Daily Mayjosefina Umaña, PA-C     • aspirin  81 mg Oral Daily Mayjosefina Umaña, PA-C     • atorvastatin  40 mg Oral After Lacey Hassan, PA-C     • benztropine  1 mg Oral BID Mayjosefina Umaña, PA-C     • busPIRone  20 mg Oral TID Mayjosefina Umaña, PA-C     • enoxaparin  40 mg Subcutaneous Q24H [address] Babita Orellana MD     • ferrous sulfate  325 mg Oral Daily With Breakfast Malachi Umaña, PA-C     • folic acid  6,192 mcg Oral Daily Mayjosefina Umaña, PA-C     • HYDROmorphone  0 5 mg Intravenous Q4H PRN Babita Orellana MD     • hydrOXYzine HCL  50 mg Oral TID PRN Malachi Umaña, PA-C     • lithium carbonate  300 mg Oral HS Malachi Umaña, PA-C     • LORazepam  0 5 mg Oral BID PRN Malachi Umaña, PA-C     • mirtazapine  7 5 mg Oral QPM Mayjosefina Umaña, PA-C     • ondansetron  4 mg Intravenous Q6H PRN Malachi Umaña, PA-C     • oxyCODONE  5 mg Oral Q4H PRN Babita Orellana MD     • pantoprazole  40 mg Intravenous Q24H [address] Babita Orellana MD     • PARoxetine  30 mg Oral BID Malachi Umaña, PA-C     • PARoxetine  60 mg Oral HS Malachi Umaña, PA-C     • prazosin  2 mg Oral HS Mayjosefina Umaña, PA-C     • pregabalin  50 mg Oral BID Mayjosefina Umaña, PA-C     • QUEtiapine  50 mg Oral TID Mayjosefina Umaña, PA-C     • sodium chloride  75 mL/hr Intravenous Continuous Babita Orellana MD 75 mL/hr (03/27/23 0728)   • Valbenazine Tosylate  40 mg Oral Daily Mayjosefina Umaña, PA-C Today, Patient Was Seen By: Odessa De Leon MD    **Please Note: This note may have been constructed using a voice recognition system  **

## 2023-03-27 NOTE — PLAN OF CARE
Problem: Potential for Falls  Goal: Patient will remain free of falls  Description: INTERVENTIONS:  - Educate patient/family on patient safety including physical limitations  - Instruct patient to call for assistance with activity   - Consult OT/PT to assist with strengthening/mobility   - Keep Call bell within reach  - Keep bed low and locked with side rails adjusted as appropriate  - Keep care items and personal belongings within reach  - Initiate and maintain comfort rounds  - Make Fall Risk Sign visible to staff  - Offer Toileting every  Hours, in advance of need  - Initiate/Maintain alarm  - Obtain necessary fall risk management equipment:   - Apply yellow socks and bracelet for high fall risk patients  - Consider moving patient to room near nurses station  Outcome: Progressing     Problem: PAIN - ADULT  Goal: Verbalizes/displays adequate comfort level or baseline comfort level  Description: Interventions:  - Encourage patient to monitor pain and request assistance  - Assess pain using appropriate pain scale  - Administer analgesics based on type and severity of pain and evaluate response  - Implement non-pharmacological measures as appropriate and evaluate response  - Consider cultural and social influences on pain and pain management  - Notify physician/advanced practitioner if interventions unsuccessful or patient reports new pain  Outcome: Progressing     Problem: INFECTION - ADULT  Goal: Absence or prevention of progression during hospitalization  Description: INTERVENTIONS:  - Assess and monitor for signs and symptoms of infection  - Monitor lab/diagnostic results  - Monitor all insertion sites, i e  indwelling lines, tubes, and drains  - Monitor endotracheal if appropriate and nasal secretions for changes in amount and color  - [place] appropriate cooling/warming therapies per order  - Administer medications as ordered  - Instruct and encourage patient and family to use good hand hygiene technique  - Identify and instruct in appropriate isolation precautions for identified infection/condition  Outcome: Progressing     Problem: SAFETY ADULT  Goal: Patient will remain free of falls  Description: INTERVENTIONS:  - Educate patient/family on patient safety including physical limitations  - Instruct patient to call for assistance with activity   - Consult OT/PT to assist with strengthening/mobility   - Keep Call bell within reach  - Keep bed low and locked with side rails adjusted as appropriate  - Keep care items and personal belongings within reach  - Initiate and maintain comfort rounds  - Make Fall Risk Sign visible to staff  - Offer Toileting every  Hours, in advance of need  - Initiate/Maintain alarm  - Obtain necessary fall risk management equipment:   - Apply yellow socks and bracelet for high fall risk patients  - Consider moving patient to room near nurses station  Outcome: Progressing  Goal: Maintain or return to baseline ADL function  Description: INTERVENTIONS:  -  Assess patient's ability to carry out ADLs; assess patient's baseline for ADL function and identify physical deficits which impact ability to perform ADLs (bathing, care of mouth/teeth, toileting, grooming, dressing, etc )  - Assess/evaluate cause of self-care deficits   - Assess range of motion  - Assess patient's mobility; develop plan if impaired  - Assess patient's need for assistive devices and provide as appropriate  - Encourage maximum independence but intervene and supervise when necessary  - Involve family in performance of ADLs  - Assess for home care needs following discharge   - Consider OT consult to assist with ADL evaluation and planning for discharge  - Provide patient education as appropriate  Outcome: Progressing  Goal: Maintains/Returns to pre admission functional level  Description: INTERVENTIONS:  - Perform BMAT or MOVE assessment daily    - Set and communicate daily mobility goal to care team and patient/family/caregiver  - Collaborate with rehabilitation services on mobility goals if consulted  - Perform Range of Motion  times a day  - Reposition patient every  hours  - Dangle patient  times a day  - Stand patient  times a day  - Ambulate patient  times a day  - Out of bed to chair  times a day   - Out of bed for meals  times a day  - Out of bed for toileting  - Record patient progress and toleration of activity level   Outcome: Progressing     Problem: DISCHARGE PLANNING  Goal: Discharge to home or other facility with appropriate resources  Description: INTERVENTIONS:  - Identify barriers to discharge w/patient and caregiver  - Arrange for needed discharge resources and transportation as appropriate  - Identify discharge learning needs (meds, wound care, etc )  - Arrange for interpretive services to assist at discharge as needed  - Refer to Case Management Department for coordinating discharge planning if the patient needs post-hospital services based on physician/advanced practitioner order or complex needs related to functional status, cognitive ability, or social support system  Outcome: Progressing     Problem: Knowledge Deficit  Goal: Patient/family/caregiver demonstrates understanding of disease process, treatment plan, medications, and discharge instructions  Description: Complete learning assessment and assess knowledge base    Interventions:  - Provide teaching at level of understanding  - Provide teaching via preferred learning methods  Outcome: Progressing

## 2023-03-27 NOTE — ASSESSMENT & PLAN NOTE
Patient presented to the ED following multiple episodes of vomiting at home x one day  She reports two episodes of diarrhea at home, patient had multiple episodes of vomiting in the ED      · CT abdomen/pelvis showed mid to distal high-grade small bowel obstruction possibly due to adhesions  · Lactic acid 5 2-->1 9 following 2L fluids in ED  · Surgery consultation appreciated  · Surgery recommended NGT however attempts unsuccessful after which patient refused  · Patient was initially advanced to clear liquid diet on 3/25/2023 however had some vomiting overnight, diet was changed to n p o  repeat KUB 3/26 showed small bowel obstruction without change  · Patient had a bowel movement this morning, abdomen is soft, positive bowel sounds  · Per surgery continue clear liquid diet  · IV fluid  · Pain control

## 2023-03-27 NOTE — ASSESSMENT & PLAN NOTE
· Continue amlodipine Tranexamic Acid Pregnancy And Lactation Text: It is unknown if this medication is safe during pregnancy or breast feeding.

## 2023-03-27 NOTE — UTILIZATION REVIEW
Notification of Unplanned, Urgent, or   Emergency Inpatient Admission   [address]  Tax ID: [id number]  NPI: [id number]  Place of Service: [address] [place], [place] [address]y  60[address]  Admission Level of Care: Inpatient  Place of Service Code: 21     Attending Physician Information  Attending Name and NPI#Balwinder Barnett, [address] [[id number]]  Phone: [phone]     Admission Information  Inpatient Admission Date/Time: 3/24/23  1:29 AM  Discharge Date/Time: No discharge date for patient encounter  Admitting Diagnosis Code/Description:  Hiatal hernia [K44 9]  Hypokalemia [E87 6]  Lactic acidosis [E87 20]  Small bowel obstruction (HCC) [K56 609]  Chest pain [R07 9]  Abdominal pain [R10 9]  Nausea & vomiting [R11 2]     Utilization Review Contact  Mauri Lopez, Utilization   Phone: [phone]  Fax: [phone]  Email: [email or web address]  Contact for approvals/pending authorizations, clinical reviews, and discharge  Physician Advisory Services Contact  Medical Necessity Denial & [id number] Discussion  Phone: [phone]  Fax: [phone]  Email: [email or web address]

## 2023-03-27 NOTE — CASE MANAGEMENT
Case Management Discharge Planning Note    Patient name Yair Beck  Location [address] [place] [address] [address]  [id number]-* MRN [id number]  : 1965 Date 3/27/2023       Current Admission Date: 3/23/2023  Current Admission Diagnosis:Small bowel obstruction [facility])   Patient Active Problem List    Diagnosis Date Noted   • Small bowel obstruction ([facility] [address] [facility] ) 2023   • Memory loss 2023   • Hemiplegia, post-stroke ([facility] [facility] ) 2022   • Anemia 2022   • Status post total knee replacement, left 2022   • Seizure-like activity ([facility] ) 2022   • Hypokalemia 2022   • Constipation 03/15/2022   • S/P total knee arthroplasty, right 03/10/2022   • CVA (cerebral vascular accident) ([facility] ) 2022   • Preoperative evaluation to rule out surgical contraindication 2022   • Leukopenia 2021   • Mixed hyperlipidemia 2021   • Medical clearance for psychiatric admission 2021   • History of CVA (cerebrovascular accident) 2021   • Encephalopathy 2021   • Nausea 2021   • Multiple closed fractures of metatarsal bone of right foot 2021   • Chronic back pain 2021   • Arthritis of right knee 10/06/2020   • Dysphagia 2020   • Ambulatory dysfunction 2020   • Status post insertion of spinal cord stimulator 2020   • Superficial bacterial infection of skin 2020   • Scar of back 2020   • Impaired skin integrity associated with surgical incision 2020   • Rash 2020   • Primary osteoarthritis of both knees 2020   • Patellofemoral disorder of both knees 2020   • Tardive dyskinesia 2020   • MIMI (obstructive sleep apnea)    • Complaint related to dreams 2020   • Family history of colorectal cancer 2019   • Encounter for long-term use of opiate analgesic 2019   • Post laminectomy syndrome 10/07/2019   • Lumbar disc disease with radiculopathy 2018   • Spinal stenosis of lumbar region with neurogenic claudication 02/02/2018   • Lumbar radiculopathy 12/08/2017   • Bilateral hip pain 06/19/2017   • Primary localized osteoarthritis of both knees 06/16/2017   • Chronic pain disorder 02/28/2017   • Opioid dependence ([facility] ) 02/28/2017   • Sacroiliitis ([facility] ) 02/28/2017   • Lumbar spondylosis 10/31/2016   • Osteoarthritis of both hips 10/31/2016   • Generalized anxiety disorder 10/26/2016   • Major depressive disorder, recurrent episode, moderate degree ([facility] [facility] ) 09/08/2016   • Right knee pain 06/06/2016   • Recent unexplained weight loss 06/06/2016   • Fatigue 10/26/2015   • Bipolar II disorder ([facility] ) 06/18/2015   • Panic disorder without agoraphobia 06/18/2015   • Post traumatic stress disorder 06/18/2015   • Left hip pain 07/25/2014   • Intractable low back pain 06/16/2014   • Tremor 06/12/2014   • Migraine 05/27/2014   • Esophageal reflux 05/01/2014   • Cognitive disorder 04/18/2014   • Female pelvic pain 10/30/2013   • Pain in joint, shoulder region 10/28/2013   • Overactive bladder 09/26/2013   • Osteoarthritis of knee 02/20/2013   • Insomnia 01/31/2013   • History of hypokalemia 01/03/2013   • Urinary incontinence 09/24/2012   • Vitamin D deficiency 09/18/2012   • Fibromyalgia 09/14/2012   • Essential hypertension 09/14/2012      LOS (days): 3  Geometric Mean LOS (GMLOS) (days): 3 00  Days to GMLOS:-0 6     OBJECTIVE:  Risk of Unplanned Readmission Score: 31 25         Current admission status: Inpatient   Preferred Pharmacy:   [address] [place] [address] [address] [place] [address]  Phone: [phone] Fax: [phone]    [id number] [address]  [address] [facility] [address] [address]  Phone: [phone] Fax: [place], [address] [place] [address]  [address]  [address] [place] [address]  Phone: [phone] Fax: [phone] [address] [facility],[address], [place] - [place]   [address]  [address] [facility] [address]  Phone: [phone] Fax: [address], [address] [address]  Phone: [phone] Fax: [phone]    Primary Care Provider: Shannan Connell DO    Primary Insurance: [address] MEDICARE [facility] REP  Secondary Insurance: [facility] [address]    DISCHARGE DETAILS:    Discharge planning discussed with[de-identified] patient's dgt Cyrie  [place] of Choice: Yes  Comments - Freedom of Choice: patient & dgt agreeable to rehab pending rehab choices in [place]  CM contacted family/caregiver?: Yes  Were Treatment Team discharge recommendations reviewed with patient/caregiver?: Yes          Contacts  Patient Contacts: Joe Rodriguez(Dtr) [phone] (M)  Relationship to Patient[de-identified] Family  Contact Method: Phone  Phone Number: [phone]  Reason/Outcome: Discharge Planning, Continuity of Care, Emergency Contact    Other Referral/Resources/Interventions Provided:  Referral Comments: Rehab referrals pending in [place]    Treatment Team Recommendation: Short Term Rehab  Discharge Destination Plan[de-identified] Short Term Rehab     Additional Comments: CM spoke to Joe Lu, patient's daughter, per daughter patient is agreeable to rehab  CM awaiting accepting facilities to print choice  Patient usually lives with her daughter and daughters  and their children  Family is very supportive

## 2023-03-28 ENCOUNTER — APPOINTMENT (INPATIENT)
Dept: CT IMAGING | Facility: HOSPITAL | Age: 58
End: 2023-03-28

## 2023-03-28 LAB
ANION GAP SERPL CALCULATED.3IONS-SCNC: 5 MMOL/L (ref 4–13)
BASOPHILS # BLD AUTO: 0.02 THOUSANDS/ÂΜL (ref 0–0.1)
BASOPHILS NFR BLD AUTO: 1 % (ref 0–1)
BUN SERPL-MCNC: 3 MG/DL (ref 5–25)
CALCIUM SERPL-MCNC: 7.9 MG/DL (ref 8.4–10.2)
CHLORIDE SERPL-SCNC: 111 MMOL/L (ref 96–108)
CO2 SERPL-SCNC: 26 MMOL/L (ref 21–32)
CREAT SERPL-MCNC: 0.63 MG/DL (ref 0.6–1.3)
EOSINOPHIL # BLD AUTO: 0.13 THOUSAND/ÂΜL (ref 0–0.61)
EOSINOPHIL NFR BLD AUTO: 4 % (ref 0–6)
ERYTHROCYTE [DISTWIDTH] IN BLOOD BY AUTOMATED COUNT: 14.3 % (ref 11.6–15.1)
GFR SERPL CREATININE-BSD FRML MDRD: 98 ML/MIN/1.73SQ M
GLUCOSE SERPL-MCNC: 72 MG/DL (ref 65–140)
HCT VFR BLD AUTO: 32.3 % (ref 34.8–46.1)
HGB BLD-MCNC: 10.7 G/DL (ref 11.5–15.4)
IMM GRANULOCYTES # BLD AUTO: 0.02 THOUSAND/UL (ref 0–0.2)
IMM GRANULOCYTES NFR BLD AUTO: 1 % (ref 0–2)
LYMPHOCYTES # BLD AUTO: 1.22 THOUSANDS/ÂΜL (ref 0.6–4.47)
LYMPHOCYTES NFR BLD AUTO: 41 % (ref 14–44)
MAGNESIUM SERPL-MCNC: 1.8 MG/DL (ref 1.9–2.7)
MCH RBC QN AUTO: 30 PG (ref 26.8–34.3)
MCHC RBC AUTO-ENTMCNC: 33.1 G/DL (ref 31.4–37.4)
MCV RBC AUTO: 91 FL (ref 82–98)
MONOCYTES # BLD AUTO: 0.4 THOUSAND/ÂΜL (ref 0.17–1.22)
MONOCYTES NFR BLD AUTO: 13 % (ref 4–12)
NEUTROPHILS # BLD AUTO: 1.19 THOUSANDS/ÂΜL (ref 1.85–7.62)
NEUTS SEG NFR BLD AUTO: 40 % (ref 43–75)
NRBC BLD AUTO-RTO: 0 /100 WBCS
PLATELET # BLD AUTO: 261 THOUSANDS/UL (ref 149–390)
PMV BLD AUTO: 9.2 FL (ref 8.9–12.7)
POTASSIUM SERPL-SCNC: 3.2 MMOL/L (ref 3.5–5.3)
RBC # BLD AUTO: 3.57 MILLION/UL (ref 3.81–5.12)
SODIUM SERPL-SCNC: 142 MMOL/L (ref 135–147)
WBC # BLD AUTO: 2.98 THOUSAND/UL (ref 4.31–10.16)

## 2023-03-28 RX ORDER — MAGNESIUM SULFATE HEPTAHYDRATE 40 MG/ML
2 INJECTION, SOLUTION INTRAVENOUS ONCE
Status: COMPLETED | OUTPATIENT
Start: 2023-03-28 | End: 2023-03-28

## 2023-03-28 RX ORDER — POTASSIUM CHLORIDE 14.9 MG/ML
20 INJECTION INTRAVENOUS ONCE
Status: COMPLETED | OUTPATIENT
Start: 2023-03-28 | End: 2023-03-28

## 2023-03-28 RX ADMIN — POTASSIUM CHLORIDE 20 MEQ: 14.9 INJECTION, SOLUTION INTRAVENOUS at 14:17

## 2023-03-28 RX ADMIN — BENZTROPINE MESYLATE 1 MG: 1 TABLET ORAL at 15:58

## 2023-03-28 RX ADMIN — POTASSIUM CHLORIDE 20 MEQ: 14.9 INJECTION, SOLUTION INTRAVENOUS at 08:37

## 2023-03-28 RX ADMIN — ACETAMINOPHEN 650 MG: 325 TABLET ORAL at 08:36

## 2023-03-28 RX ADMIN — OXYCODONE 5 MG: 5 TABLET ORAL at 08:36

## 2023-03-28 RX ADMIN — IOHEXOL 35 ML: 300 INJECTION, SOLUTION INTRAVENOUS at 16:34

## 2023-03-28 RX ADMIN — QUETIAPINE FUMARATE 50 MG: 25 TABLET ORAL at 15:57

## 2023-03-28 RX ADMIN — PANTOPRAZOLE SODIUM 40 MG: 40 INJECTION, POWDER, FOR SOLUTION INTRAVENOUS at 08:36

## 2023-03-28 RX ADMIN — LORAZEPAM 0.5 MG: 1 TABLET ORAL at 09:04

## 2023-03-28 RX ADMIN — BUSPIRONE HYDROCHLORIDE 20 MG: 10 TABLET ORAL at 08:36

## 2023-03-28 RX ADMIN — ATORVASTATIN CALCIUM 40 MG: 40 TABLET, FILM COATED ORAL at 15:58

## 2023-03-28 RX ADMIN — OXYCODONE 5 MG: 5 TABLET ORAL at 21:18

## 2023-03-28 RX ADMIN — LITHIUM CARBONATE 300 MG: 300 TABLET, FILM COATED, EXTENDED RELEASE ORAL at 21:19

## 2023-03-28 RX ADMIN — QUETIAPINE FUMARATE 50 MG: 25 TABLET ORAL at 21:18

## 2023-03-28 RX ADMIN — PREGABALIN 50 MG: 50 CAPSULE ORAL at 15:59

## 2023-03-28 RX ADMIN — PRAZOSIN HYDROCHLORIDE 2 MG: 2 CAPSULE ORAL at 21:19

## 2023-03-28 RX ADMIN — AMLODIPINE BESYLATE 10 MG: 10 TABLET ORAL at 08:36

## 2023-03-28 RX ADMIN — FOLIC ACID 1000 MCG: 1 TABLET ORAL at 08:37

## 2023-03-28 RX ADMIN — MAGNESIUM SULFATE HEPTAHYDRATE 2 G: 40 INJECTION, SOLUTION INTRAVENOUS at 12:02

## 2023-03-28 RX ADMIN — FERROUS SULFATE TAB 325 MG (65 MG ELEMENTAL FE) 325 MG: 325 (65 FE) TAB at 08:37

## 2023-03-28 RX ADMIN — PAROXETINE 60 MG: 20 TABLET, FILM COATED ORAL at 21:18

## 2023-03-28 RX ADMIN — ASPIRIN 81 MG: 81 TABLET, COATED ORAL at 08:36

## 2023-03-28 RX ADMIN — PREGABALIN 50 MG: 50 CAPSULE ORAL at 08:36

## 2023-03-28 RX ADMIN — OXYCODONE 5 MG: 5 TABLET ORAL at 00:33

## 2023-03-28 RX ADMIN — ENOXAPARIN SODIUM 40 MG: 100 INJECTION SUBCUTANEOUS at 08:36

## 2023-03-28 RX ADMIN — PAROXETINE 30 MG: 20 TABLET, FILM COATED ORAL at 15:58

## 2023-03-28 RX ADMIN — QUETIAPINE FUMARATE 50 MG: 25 TABLET ORAL at 08:37

## 2023-03-28 RX ADMIN — BUSPIRONE HYDROCHLORIDE 20 MG: 10 TABLET ORAL at 21:18

## 2023-03-28 RX ADMIN — PAROXETINE 30 MG: 20 TABLET, FILM COATED ORAL at 08:36

## 2023-03-28 RX ADMIN — IOHEXOL 100 ML: 350 INJECTION, SOLUTION INTRAVENOUS at 16:35

## 2023-03-28 RX ADMIN — MIRTAZAPINE 7.5 MG: 15 TABLET, FILM COATED ORAL at 15:58

## 2023-03-28 RX ADMIN — BENZTROPINE MESYLATE 1 MG: 1 TABLET ORAL at 08:37

## 2023-03-28 RX ADMIN — BUSPIRONE HYDROCHLORIDE 20 MG: 10 TABLET ORAL at 15:57

## 2023-03-28 NOTE — ASSESSMENT & PLAN NOTE
Patient presented to the ED following multiple episodes of vomiting at home x one day  She reports two episodes of diarrhea at home, patient had multiple episodes of vomiting in the ED      · CT abdomen/pelvis showed mid to distal high-grade small bowel obstruction possibly due to adhesions  · Lactic acid 5 2-->1 9 following 2L fluids in ED  · Surgery consultation appreciated  · Surgery recommended NGT however attempts unsuccessful after which patient refused  · 3/25 patient was initially advanced to clear liquid diet, however had some vomiting overnight, diet was changed to n p o    · 3/26 repeat KUB showed small bowel obstruction without change  · 3/27 started on clear liquid diet, had a bowel movement  · 3/28 morning patient complaining of no flatulence, no bowel movement, distension after eating  · 3/28 CT abdomen pelvis with p o  contrast showed ileus versus partial small bowel obstruction, partially decompressed distal small bowel loops without evidence of discrete transition point  · Stricture series this morning showed contrast in right colon  · Continue clear liquid diet  · Encourage ambulation  · Pain control  · IV fluids

## 2023-03-28 NOTE — PLAN OF CARE
Problem: PHYSICAL THERAPY ADULT  Goal: Performs mobility at highest level of function for planned discharge setting  See evaluation for individualized goals  Description: Treatment/Interventions: Functional transfer training, LE strengthening/ROM, Elevations, Therapeutic exercise, Endurance training, Patient/family training, Bed mobility, Gait training, Continued evaluation, Spoke to nursing, OT          See flowsheet documentation for full assessment, interventions and recommendations  Outcome: Progressing  Note: Prognosis: Fair  Problem List: Decreased strength, Decreased endurance, Impaired balance, Decreased mobility, Decreased safety awareness, Pain, Impaired judgement  Assessment: Pt  Seen for PT treatment session as per PT POC  Pt  Reports abdominal pain 8/10  Pt  Agreeable to PT  Pt is currently functioning at supervision assist for supine to sit with use of bedrails and head of bed elevated and min assist x1 for transfers and ambulation on levels with use of Rw  Pt  demosntrates decreased safety during mobility training with the release of Rw when turning and backing up to bed with decreased balance noted, cues for improved posture, and gait quality to improve step lengths, fluency and base of support, balance and safety  PT progressed with ambulation distances to 10', 15' and [de-identified]' with use and support of rw  Noted improved focus to task this session  Pt  Performs seated b/l le arom and isometric exercises x 10 reps with increased time and cues for correct technique and performance  Pt  Remains functioning below baseline level of mobility and will benefit from continued inpt PT and rehab in order to address impairments and optimize functional outcomes, mobility and independence  The patient's AM-PAC Basic Mobility Inpatient Short Form Raw Score is 16  A Raw score of less than or equal to 16 suggests the patient may benefit from discharge to post-acute rehabilitation services   Please also refer to the recommendation of the Physical Therapist for safe discharge planning  PT Discharge Recommendation: Post acute rehabilitation services    See flowsheet documentation for full assessment

## 2023-03-28 NOTE — PHYSICAL THERAPY NOTE
PHYSICAL THERAPY NOTE          Patient Name: Radha Alvarez  EBZHNCHAVA Date: 3/28/2023     03/28/23 1031   Note Type   Note Type Treatment   Pain Assessment   Pain Assessment Tool 0-10   Pain Score 8   Pain Location/Orientation Location: Abdomen   Pain Onset/Description Descriptor: Stabbing   Hospital Pain Intervention(s) Repositioned; Ambulation/increased activity; Emotional support   Restrictions/Precautions   Other Precautions Cognitive; Bed Alarm;Multiple lines;Pain; Fall Risk   Cognition   Overall Cognitive Status Impaired   Arousal/Participation Alert; Responsive; Cooperative   Attention Attends with cues to redirect   Orientation Level Oriented to person;Oriented to time;Oriented to place  (oriented to day, year not month or date )   Memory Decreased recall of precautions;Decreased recall of recent events;Decreased short term memory   Following Commands Follows one step commands with increased time or repetition   Subjective   Subjective i threw up all over the place  i feel so bloated with stabbing pain  Bed Mobility   Supine to Sit 5  Supervision   Additional items Assist x 1;HOB elevated; Increased time required   Transfers   Sit to Stand 4  Minimal assistance   Additional items Assist x 1; Increased time required;Verbal cues   Stand to Sit 4  Minimal assistance   Additional items Assist x 1; Armrests; Verbal cues; Increased time required   Toilet transfer 4  Minimal assistance   Additional items Assist x 1; Increased time required;Standard toilet  (stand to sit with use of grab bar, supervision with use of grb for sit to stand)   Additional Comments cues for safe technique, cues to turn and maintain use of rw at all times  Ambulation/Elevation   Gait pattern Improper Weight shift; Forward Flexion;Decreased foot clearance; Short stride; Step to;Excessively slow;Narrow CALLY; Redundant gait at times   Gait Assistance 4  Minimal assist Additional items Assist x 1;Verbal cues   Assistive Device Rolling walker   Distance 80', 10', 15'   Ambulation/Elevation Additional Comments cues for improved posture   Balance   Static Sitting Good   Dynamic Sitting Fair   Activity Tolerance   Activity Tolerance Patient limited by fatigue   Exercises   Hip Abduction Sitting;10 reps;AROM; Bilateral   Hip Adduction Sitting;10 reps;AROM; Bilateral  (isometric hip add  x 10 reps )   Knee AROM Long Arc Quad Sitting;10 reps;AROM; Bilateral   Ankle Pumps Sitting;10 reps;AROM; Bilateral   Marching Sitting;10 reps;AROM; Bilateral   Equipment Use   Comments pt  performed toileting with min assist for stand to sit, supervision for sit to stand with useof grab bar and sona care I'ly  Assessment   Prognosis Fair   Problem List Decreased strength;Decreased endurance; Impaired balance;Decreased mobility; Decreased safety awareness;Pain; Impaired judgement   Assessment Pt  Seen for PT treatment session as per PT POC  Pt  Reports abdominal pain 8/10  Pt  Agreeable to PT  Pt is currently functioning at supervision assist for supine to sit with use of bedrails and head of bed elevated and min assist x1 for transfers and ambulation on levels with use of Rw  Pt  demosntrates decreased safety during mobility training with the release of Rw when turning and backing up to bed with decreased balance noted, cues for improved posture, and gait quality to improve step lengths, fluency and base of support, balance and safety  PT progressed with ambulation distances to 10', 15' and [de-identified]' with use and support of rw  Noted improved focus to task this session  Pt  Performs seated b/l le arom and isometric exercises x 10 reps with increased time and cues for correct technique and performance  Pt  Remains functioning below baseline level of mobility and will benefit from continued inpt PT and rehab in order to address impairments and optimize functional outcomes, mobility and independence    The patient's AM-PAC Basic Mobility Inpatient Short Form Raw Score is 16  A Raw score of less than or equal to 16 suggests the patient may benefit from discharge to post-acute rehabilitation services  Please also refer to the recommendation of the Physical Therapist for safe discharge planning  Goals   Patient Goals walk and stand up strainght  STG Expiration Date 04/07/23   PT Treatment Day 1   Plan   Treatment/Interventions Functional transfer training;LE strengthening/ROM; Therapeutic exercise; Endurance training;Patient/family training;Equipment eval/education; Bed mobility;Gait training;Spoke to case management;Spoke to nursing   Progress Slow progress, decreased activity tolerance   PT Frequency 3-5x/wk   Recommendation   PT Discharge Recommendation Post acute rehabilitation services   AM-PAC Basic Mobility Inpatient   Turning in Flat Bed Without Bedrails 3   Lying on Back to Sitting on Edge of Flat Bed Without Bedrails 2   Moving Bed to Chair 3   Standing Up From Chair Using Arms 3   Walk in Room 3   Climb 3-5 Stairs With Railing 2   Basic Mobility Inpatient Raw Score 16   Basic Mobility Standardized Score 38 32   Highest Level Of Mobility   JH-HLM Goal 5: Stand one or more mins   JH-HLM Achieved 7: Walk 25 feet or more   Education   Education Provided Mobility training;Home exercise program;Assistive device   Patient Demonstrates acceptance/verbal understanding   End of Consult   Patient Position at End of Consult Seated edge of bed;Bedside chair;Bed/Chair alarm activated; All needs within reach        03/28/23 1031   Note Type   Note Type Treatment   Pain Assessment   Pain Assessment Tool 0-10   Pain Score 8   Pain Location/Orientation Location: Abdomen   Pain Onset/Description Descriptor: Stabbing   Hospital Pain Intervention(s) Repositioned; Ambulation/increased activity; Emotional support   Restrictions/Precautions   Other Precautions Cognitive; Bed Alarm;Multiple lines;Pain; Fall Risk   Cognition   Overall Cognitive Status Impaired   Arousal/Participation Alert; Responsive; Cooperative   Attention Attends with cues to redirect   Orientation Level Oriented to person;Oriented to time;Oriented to place  (oriented to day, year not month or date )   Memory Decreased recall of precautions;Decreased recall of recent events;Decreased short term memory   Following Commands Follows one step commands with increased time or repetition   Subjective   Subjective i threw up all over the place  i feel so bloated with stabbing pain  Bed Mobility   Supine to Sit 5  Supervision   Additional items Assist x 1;HOB elevated; Increased time required   Transfers   Sit to Stand 4  Minimal assistance   Additional items Assist x 1; Increased time required;Verbal cues   Stand to Sit 4  Minimal assistance   Additional items Assist x 1; Armrests; Verbal cues; Increased time required   Toilet transfer 4  Minimal assistance   Additional items Assist x 1; Increased time required;Standard toilet  (stand to sit with use of grab bar, supervision with use of grb for sit to stand)   Additional Comments cues for safe technique, cues to turn and maintain use of rw at all times  Ambulation/Elevation   Gait pattern Improper Weight shift; Forward Flexion;Decreased foot clearance; Short stride; Step to;Excessively slow;Narrow CALLY; Redundant gait at times   Gait Assistance 4  Minimal assist   Additional items Assist x 1;Verbal cues   Assistive Device Rolling walker   Distance 80', 10', 15'   Ambulation/Elevation Additional Comments cues for improved posture   Balance   Static Sitting Good   Dynamic Sitting Fair   Activity Tolerance   Activity Tolerance Patient limited by fatigue   Exercises   Hip Abduction Sitting;10 reps;AROM; Bilateral   Hip Adduction Sitting;10 reps;AROM; Bilateral  (isometric hip add  x 10 reps )   Knee AROM Long Arc Quad Sitting;10 reps;AROM; Bilateral   Ankle Pumps Sitting;10 reps;AROM; Bilateral   Marching Sitting;10 reps;AROM; Bilateral   Equipment Use   Comments pt  performed toileting with min assist for stand to sit, supervision for sit to stand with useof grab bar and sona care I'ly  Assessment   Prognosis Fair   Problem List Decreased strength;Decreased endurance; Impaired balance;Decreased mobility; Decreased safety awareness;Pain; Impaired judgement   Assessment Pt  Seen for PT treatment session as per PT POC  Pt  Reports abdominal pain 8/10  Pt  Agreeable to PT  Pt is currently functioning at supervision assist for supine to sit with use of bedrails and head of bed elevated and min assist x1 for transfers and ambulation on levels with use of Rw  Pt  demosntrates decreased safety during mobility training with the release of Rw when turning and backing up to bed with decreased balance noted, cues for improved posture, and gait quality to improve step lengths, fluency and base of support, balance and safety  PT progressed with ambulation distances to 10', 15' and [de-identified]' with use and support of rw  Noted improved focus to task this session  Pt  Performs seated b/l le arom and isometric exercises x 10 reps with increased time and cues for correct technique and performance  Pt  Remains functioning below baseline level of mobility and will benefit from continued inpt PT and rehab in order to address impairments and optimize functional outcomes, mobility and independence  The patient's AM-PAC Basic Mobility Inpatient Short Form Raw Score is 16  A Raw score of less than or equal to 16 suggests the patient may benefit from discharge to post-acute rehabilitation services  Please also refer to the recommendation of the Physical Therapist for safe discharge planning  Goals   Patient Goals walk and stand up [facility] Expiration Date 04/07/23   PT Treatment Day 1   Plan   Treatment/Interventions Functional transfer training;LE strengthening/ROM; Therapeutic exercise; Endurance training;Patient/family training;Equipment eval/education; Bed mobility;Gait training;Spoke to case management;Spoke to nursing   Progress Slow progress, decreased activity tolerance   PT Frequency 3-5x/wk   Recommendation   PT Discharge Recommendation Post acute rehabilitation services   AM-PAC Basic Mobility Inpatient   Turning in Flat Bed Without Bedrails 3   Lying on Back to Sitting on Edge of Flat Bed Without Bedrails 2   Moving Bed to Chair 3   Standing Up From Chair Using Arms 3   Walk in Room 3   Climb 3-5 Stairs With Railing 2   Basic Mobility Inpatient Raw Score 16   Basic Mobility Standardized Score 38 32   Highest Level Of Mobility   -HLM Goal 5: Stand one or more mins   -HLM Achieved 7: Walk 25 feet or more   Education   Education Provided Mobility training;Home exercise program;Assistive device   Patient Demonstrates acceptance/verbal understanding   End of Consult   Patient Position at End of Consult Seated edge of bed;Bedside chair;Bed/Chair alarm activated; All needs within reach     Lita Gallegos [place]

## 2023-03-28 NOTE — PROGRESS NOTES
"Progress Note - General Surgery   Samantha Hartmann 59 y o  female MRN: [id number]  Unit/Bed#: E5 -01 Encounter: [id number]    Assessment:  56yoF p/w enteritis vs partial SBO     Vitals stable, afebrile    Plan:  -Patient still complaining of nausea w/ some abd distention  Reports no flatus/bms yesterday  We will plan for CT scan with PO contrast   -Continue clears for now  -Pain, nausea control PRN  -Encourage ambulation  -Lovenox  -Remainder of care per primary    Subjective/Objective   Subjective:   No acute events overnight  Still complaining of abd distention with nausea  No vomiting yday  No flatus or bowel movements yday  Objective:     Blood pressure 118/77, pulse 70, temperature 98 8 °F (37 1 °C), temperature source Oral, resp  rate 18, height 5' 1\" (1 549 m), weight 72 5 kg (159 lb 13 3 oz), SpO2 95 %, not currently breastfeeding  ,Body mass index is 30 2 kg/m²  Intake/Output Summary (Last 24 hours) at 3/28/2023 0824  Last data filed at 3/28/2023 0732  Gross per 24 hour   Intake 358 ml   Output 3500 ml   Net -3142 ml       Invasive Devices     Peripheral Intravenous Line  Duration           Peripheral IV 03/27/23 Dorsal (posterior); Left Hand 1 day                Physical Exam:   General: NAD  HENT: NCAT MMM  Neck: supple, no JVD  CV: nl rate  Lungs: nl wob  No resp distress  ABD: Soft, mild epigastric tendrness, moderate distention  Extrem: No CCE  Neuro: AAOx3      Lab, Imaging and other studies:  I have personally reviewed pertinent lab results    , CBC:   Lab Results   Component Value Date    WBC 2 98 (L) 03/28/2023    HGB 10 7 (L) 03/28/2023    HCT 32 3 (L) 03/28/2023    MCV 91 03/28/2023     03/28/2023    MCH 30 0 03/28/2023    MCHC 33 1 03/28/2023    RDW 14 3 03/28/2023    MPV 9 2 03/28/2023    NRBC 0 03/28/2023   , CMP:   Lab Results   Component Value Date    SODIUM 142 03/28/2023    K 3 2 (L) 03/28/2023     (H) 03/28/2023    CO2 26 03/28/2023    BUN 3 (L) 03/28/2023    " CREATININE 0 63 03/28/2023    CALCIUM 7 9 (L) 03/28/2023    EGFR 98 03/28/2023     VTE Pharmacologic Prophylaxis: Sequential compression device (Venodyne)  and Enoxaparin (Lovenox)  VTE Mechanical Prophylaxis: sequential compression device

## 2023-03-28 NOTE — PLAN OF CARE
Problem: OCCUPATIONAL THERAPY ADULT  Goal: Performs self-care activities at highest level of function for planned discharge setting  See evaluation for individualized goals  Description: Treatment Interventions: ADL retraining, Functional transfer training, UE strengthening/ROM, Endurance training, Cognitive reorientation, Patient/family training, Compensatory technique education, Continued evaluation          See flowsheet documentation for full assessment, interventions and recommendations  Note: Limitation: Decreased ADL status, Decreased UE strength, Decreased Safe judgement during ADL, Decreased cognition, Decreased endurance, Decreased high-level ADLs  Prognosis: Fair  Assessment: Patient participated in skilled OT session this date with interventions consisting of therapeutic activities self-care/ADL training to increase B UE strength, functional endurance/activity tolerance, static/dynamic sitting/standing balance, and overall safety awareness to increase (I) with ADLs/IADLs to return to PLOF  Provided education on energy conservation techniques, deep breathing techniques, fall prevention strategies, and overall safety awareness  Patient agreeable to OT treatment session, upon arrival patient was found seated OOB to Recliner  Patient requiring verbal cues for safety and verbal cues for pacing through activity steps  Please refer to flowsheet for functional performance  Patient continues to be functioning below baseline level, occupational performance remains limited secondary to factors listed above and increased risk for falls and injury  Pt left /supine in bed, alarm armed and call bell and bedside table within reach; all needs met       OT Discharge Recommendation: Post acute rehabilitation services Yes

## 2023-03-28 NOTE — ASSESSMENT & PLAN NOTE
· Potassium 3 2, potassium chloride 40 mEq IV  · Magnesium 1 8, 2 g magnesium IV sulfate  · Recheck labs tomorrow

## 2023-03-28 NOTE — ASSESSMENT & PLAN NOTE
Patient presented to the ED following multiple episodes of vomiting at home x one day  She reports two episodes of diarrhea at home, patient had multiple episodes of vomiting in the ED      · CT abdomen/pelvis showed mid to distal high-grade small bowel obstruction possibly due to adhesions  · Lactic acid 5 2-->1 9 following 2L fluids in ED  · Surgery consultation appreciated  · Surgery recommended NGT however attempts unsuccessful after which patient refused  · Patient was initially advanced to clear liquid diet on 3/25/2023 however had some vomiting overnight, diet was changed to n p o  repeat KUB 3/26 showed small bowel obstruction without change  · Started on clear liquid diet 3/27, had a bowel movement  · This morning patient is complaining of no flatulence, no bowel movement since 3/27 morning, distension after eating, has BS  · Per surgery continue clear liquid diet  · CT abdomen pelvis with p o  contrast  · Encourage ambulation  · IV fluid  · Pain control

## 2023-03-28 NOTE — ASSESSMENT & PLAN NOTE
· Her last hemoglobin was around 13  · Hemoglobin on admission 16, received aggressive IV hydration, all saline drops  · No signs of bleeding  · Continue to monitor

## 2023-03-28 NOTE — PLAN OF CARE
Problem: Potential for Falls  Goal: Patient will remain free of falls  Description: INTERVENTIONS:  - Educate patient/family on patient safety including physical limitations  - Instruct patient to call for assistance with activity   - Consult OT/PT to assist with strengthening/mobility   - Keep Call bell within reach  - Keep bed low and locked with side rails adjusted as appropriate  - Keep care items and personal belongings within reach  - Initiate and maintain comfort rounds  - Make Fall Risk Sign visible to staff  - Apply yellow socks and bracelet for high fall risk patients  - Consider moving patient to room near nurses station  Outcome: Progressing     Problem: PAIN - ADULT  Goal: Verbalizes/displays adequate comfort level or baseline comfort level  Description: Interventions:  - Encourage patient to monitor pain and request assistance  - Assess pain using appropriate pain scale  - Administer analgesics based on type and severity of pain and evaluate response  - Implement non-pharmacological measures as appropriate and evaluate response  - Consider cultural and social influences on pain and pain management  - Notify physician/advanced practitioner if interventions unsuccessful or patient reports new pain  Outcome: Progressing     Problem: INFECTION - ADULT  Goal: Absence or prevention of progression during hospitalization  Description: INTERVENTIONS:  - Assess and monitor for signs and symptoms of infection  - Monitor lab/diagnostic results  - Monitor all insertion sites, i e  indwelling lines, tubes, and drains  - Monitor endotracheal if appropriate and nasal secretions for changes in amount and color  - [place] appropriate cooling/warming therapies per order  - Administer medications as ordered  - Instruct and encourage patient and family to use good hand hygiene technique  - Identify and instruct in appropriate isolation precautions for identified infection/condition  Outcome: Progressing     Problem: SAFETY ADULT  Goal: Patient will remain free of falls  Description: INTERVENTIONS:  - Educate patient/family on patient safety including physical limitations  - Instruct patient to call for assistance with activity   - Consult OT/PT to assist with strengthening/mobility   - Keep Call bell within reach  - Keep bed low and locked with side rails adjusted as appropriate  - Keep care items and personal belongings within reach  - Initiate and maintain comfort rounds  - Make Fall Risk Sign visible to staff  - Apply yellow socks and bracelet for high fall risk patients  - Consider moving patient to room near nurses station  Outcome: Progressing  Goal: Maintain or return to baseline ADL function  Description: INTERVENTIONS:  -  Assess patient's ability to carry out ADLs; assess patient's baseline for ADL function and identify physical deficits which impact ability to perform ADLs (bathing, care of mouth/teeth, toileting, grooming, dressing, etc )  - Assess/evaluate cause of self-care deficits   - Assess range of motion  - Assess patient's mobility; develop plan if impaired  - Assess patient's need for assistive devices and provide as appropriate  - Encourage maximum independence but intervene and supervise when necessary  - Involve family in performance of ADLs  - Assess for home care needs following discharge   - Consider OT consult to assist with ADL evaluation and planning for discharge  - Provide patient education as appropriate  Outcome: Progressing  Goal: Maintains/Returns to pre admission functional level  Description: INTERVENTIONS:  - Perform BMAT or MOVE assessment daily    - Set and communicate daily mobility goal to care team and patient/family/caregiver     - Collaborate with rehabilitation services on mobility goals if consulted  - Out of bed for toileting  - Record patient progress and toleration of activity level   Outcome: Progressing     Problem: DISCHARGE PLANNING  Goal: Discharge to home or other facility with appropriate resources  Description: INTERVENTIONS:  - Identify barriers to discharge w/patient and caregiver  - Arrange for needed discharge resources and transportation as appropriate  - Identify discharge learning needs (meds, wound care, etc )  - Arrange for interpretive services to assist at discharge as needed  - Refer to Case Management Department for coordinating discharge planning if the patient needs post-hospital services based on physician/advanced practitioner order or complex needs related to functional status, cognitive ability, or social support system  Outcome: Progressing     Problem: Knowledge Deficit  Goal: Patient/family/caregiver demonstrates understanding of disease process, treatment plan, medications, and discharge instructions  Description: Complete learning assessment and assess knowledge base    Interventions:  - Provide teaching at level of understanding  - Provide teaching via preferred learning methods  Outcome: Progressing     Problem: MOBILITY - ADULT  Goal: Maintain or return to baseline ADL function  Description: INTERVENTIONS:  -  Assess patient's ability to carry out ADLs; assess patient's baseline for ADL function and identify physical deficits which impact ability to perform ADLs (bathing, care of mouth/teeth, toileting, grooming, dressing, etc )  - Assess/evaluate cause of self-care deficits   - Assess range of motion  - Assess patient's mobility; develop plan if impaired  - Assess patient's need for assistive devices and provide as appropriate  - Encourage maximum independence but intervene and supervise when necessary  - Involve family in performance of ADLs  - Assess for home care needs following discharge   - Consider OT consult to assist with ADL evaluation and planning for discharge  - Provide patient education as appropriate  Outcome: Progressing  Goal: Maintains/Returns to pre admission functional level  Description: INTERVENTIONS:  - Perform BMAT or MOVE assessment daily    - Set and communicate daily mobility goal to care team and patient/family/caregiver     - Collaborate with rehabilitation services on mobility goals if consulted  - Out of bed for toileting  - Record patient progress and toleration of activity level   Outcome: Progressing

## 2023-03-28 NOTE — PLAN OF CARE
Problem: Potential for Falls  Goal: Patient will remain free of falls  Description: INTERVENTIONS:  - Educate patient/family on patient safety including physical limitations  - Instruct patient to call for assistance with activity   - Consult OT/PT to assist with strengthening/mobility   - Keep Call bell within reach  - Keep bed low and locked with side rails adjusted as appropriate  - Keep care items and personal belongings within reach  - Initiate and maintain comfort rounds  - Make Fall Risk Sign visible to staff  - Offer Toileting every 2 Hours, in advance of need  - Initiate/Maintain bed alarm  - Obtain necessary fall risk management equipment:   - Apply yellow socks and bracelet for high fall risk patients  - Consider moving patient to room near nurses station  Outcome: Progressing     Problem: PAIN - ADULT  Goal: Verbalizes/displays adequate comfort level or baseline comfort level  Description: Interventions:  - Encourage patient to monitor pain and request assistance  - Assess pain using appropriate pain scale  - Administer analgesics based on type and severity of pain and evaluate response  - Implement non-pharmacological measures as appropriate and evaluate response  - Consider cultural and social influences on pain and pain management  - Notify physician/advanced practitioner if interventions unsuccessful or patient reports new pain  Outcome: Progressing     Problem: INFECTION - ADULT  Goal: Absence or prevention of progression during hospitalization  Description: INTERVENTIONS:  - Assess and monitor for signs and symptoms of infection  - Monitor lab/diagnostic results  - Monitor all insertion sites, i e  indwelling lines, tubes, and drains  - Monitor endotracheal if appropriate and nasal secretions for changes in amount and color  - [place] appropriate cooling/warming therapies per order  - Administer medications as ordered  - Instruct and encourage patient and family to use good hand hygiene technique  - Identify and instruct in appropriate isolation precautions for identified infection/condition  Outcome: Progressing     Problem: SAFETY ADULT  Goal: Patient will remain free of falls  Description: INTERVENTIONS:  - Educate patient/family on patient safety including physical limitations  - Instruct patient to call for assistance with activity   - Consult OT/PT to assist with strengthening/mobility   - Keep Call bell within reach  - Keep bed low and locked with side rails adjusted as appropriate  - Keep care items and personal belongings within reach  - Initiate and maintain comfort rounds  - Make Fall Risk Sign visible to staff  - Offer Toileting every 2 Hours, in advance of need  - Initiate/Maintain bedalarm  - Obtain necessary fall risk management equipment:   - Apply yellow socks and bracelet for high fall risk patients  - Consider moving patient to room near nurses station  Outcome: Progressing  Goal: Maintain or return to baseline ADL function  Description: INTERVENTIONS:  -  Assess patient's ability to carry out ADLs; assess patient's baseline for ADL function and identify physical deficits which impact ability to perform ADLs (bathing, care of mouth/teeth, toileting, grooming, dressing, etc )  - Assess/evaluate cause of self-care deficits   - Assess range of motion  - Assess patient's mobility; develop plan if impaired  - Assess patient's need for assistive devices and provide as appropriate  - Encourage maximum independence but intervene and supervise when necessary  - Involve family in performance of ADLs  - Assess for home care needs following discharge   - Consider OT consult to assist with ADL evaluation and planning for discharge  - Provide patient education as appropriate  Outcome: Progressing  Goal: Maintains/Returns to pre admission functional level  Description: INTERVENTIONS:  - Perform BMAT or MOVE assessment daily    - Set and communicate daily mobility goal to care team and patient/family/caregiver  - Collaborate with rehabilitation services on mobility goals if consulted  - Perform Range of Motion   - Reposition patient   - Dangle patient   - Stand patient   - Ambulate patient   - Out of bed to chair   - Out of bed for meals   - Out of bed for toileting  - Record patient progress and toleration of activity level   Outcome: Progressing     Problem: DISCHARGE PLANNING  Goal: Discharge to home or other facility with appropriate resources  Description: INTERVENTIONS:  - Identify barriers to discharge w/patient and caregiver  - Arrange for needed discharge resources and transportation as appropriate  - Identify discharge learning needs (meds, wound care, etc )  - Arrange for interpretive services to assist at discharge as needed  - Refer to Case Management Department for coordinating discharge planning if the patient needs post-hospital services based on physician/advanced practitioner order or complex needs related to functional status, cognitive ability, or social support system  Outcome: Progressing     Problem: Knowledge Deficit  Goal: Patient/family/caregiver demonstrates understanding of disease process, treatment plan, medications, and discharge instructions  Description: Complete learning assessment and assess knowledge base    Interventions:  - Provide teaching at level of understanding  - Provide teaching via preferred learning methods  Outcome: Progressing     Problem: MOBILITY - ADULT  Goal: Maintain or return to baseline ADL function  Description: INTERVENTIONS:  -  Assess patient's ability to carry out ADLs; assess patient's baseline for ADL function and identify physical deficits which impact ability to perform ADLs (bathing, care of mouth/teeth, toileting, grooming, dressing, etc )  - Assess/evaluate cause of self-care deficits   - Assess range of motion  - Assess patient's mobility; develop plan if impaired  - Assess patient's need for assistive devices and provide as appropriate  - Encourage maximum independence but intervene and supervise when necessary  - Involve family in performance of ADLs  - Assess for home care needs following discharge   - Consider OT consult to assist with ADL evaluation and planning for discharge  - Provide patient education as appropriate  Outcome: Progressing  Goal: Maintains/Returns to pre admission functional level  Description: INTERVENTIONS:  - Perform BMAT or MOVE assessment daily    - Set and communicate daily mobility goal to care team and patient/family/caregiver  - Collaborate with rehabilitation services on mobility goals if consulted  - Perform Range of Motion  - Reposition patient every 2 hours    - Dangle patient   - Stand patient   - Ambulate patient   - Out of bed to chair  - Out of bed for meals   - Out of bed for toileting  - Record patient progress and toleration of activity level   Outcome: Progressing

## 2023-03-28 NOTE — PROGRESS NOTES
[address] [place] [address]  Progress Note  Name: Doug Jacques  MRN: [id number]  Unit/Bed#: E5 -01 I Date of Admission: 3/23/2023   Date of Service: 3/28/2023 I Hospital Day: 4    Assessment/Plan   * Small bowel obstruction [facility])  Assessment & Plan  Patient presented to the ED following multiple episodes of vomiting at home x one day  She reports two episodes of diarrhea at home, patient had multiple episodes of vomiting in the ED      · CT abdomen/pelvis showed mid to distal high-grade small bowel obstruction possibly due to adhesions  · Lactic acid 5 2-->1 9 following 2L fluids in ED  · Surgery consultation appreciated  · Surgery recommended NGT however attempts unsuccessful after which patient refused  · Patient was initially advanced to clear liquid diet on 3/25/2023 however had some vomiting overnight, diet was changed to n p o  repeat KUB 3/26 showed small bowel obstruction without change  · Started on clear liquid diet 3/27, had a bowel movement  · This morning patient is complaining of no flatulence, no bowel movement since 3/27 morning, distension after eating, has BS  · Per surgery continue clear liquid diet  · CT abdomen pelvis with p o  contrast  · Encourage ambulation  · IV fluid  · Pain control    Memory loss  Assessment & Plan  · Patient not great historian   · Followed outpatient by neurology for this issue   · Scheduled to follow-up outpatient with neuropsych after ENT hearing evaluation completed    Anemia  Assessment & Plan  · Her last hemoglobin was around 13  · Hemoglobin on admission 16, received aggressive IV hydration, all saline drops  · No signs of bleeding  · Continue to monitor    Hypokalemia  Assessment & Plan  · Potassium 3 2, potassium chloride 40 mEq IV  · Magnesium 1 8, 2 g magnesium IV sulfate  · Recheck labs tomorrow    Mixed hyperlipidemia  Assessment & Plan  · Continue statin    Tardive dyskinesia  Assessment & Plan  · Patient with chronic tardive dyskinesia secondary to psychiatric medications  · Continue 1 mg cogentin b i d and 40 mg ingrezza daily    Essential hypertension  Assessment & Plan  · Continue amlodipine     Bipolar II disorder ([facility] [address] )  Assessment & Plan  · Patient mood is stable with tardive dyskinesia at baseline  · Continue home medication regimen  · 50 mg seroquel t i d  · 300 mg lithium h s  · 30 mg paxil b i d  · 60 mg paxil h s   · 20 mg buspar t i d  · 40 mg ingrezza daily  · 7 5 mg remeron h s   · 50 mg hydroxyzine b i d prn    Chronic pain disorder  Assessment & Plan  · Continue 50 mg pregabalin b i d             VTE Pharmacologic Prophylaxis: VTE Score: 2 Lovenox    Patient Centered Rounds: I performed bedside rounds with nursing staff today  Discussions with Specialists or Other Care Team Provider: nursing, cm    Education and Discussions with Family / Patient: Attempted to update  (daughter) via phone  Unable to contact  tried both numbers in the chart around 1030 am    Total Time Spent on Date of Encounter in care of patient: 35 minutes This time was spent on one or more of the following: performing physical exam; counseling and coordination of care; obtaining or reviewing history; documenting in the medical record; reviewing/ordering tests, medications or procedures; communicating with other healthcare professionals and discussing with patient's family/caregivers  Current Length of Stay: 4 day(s)  Current Patient Status: Inpatient   Certification Statement: The patient will continue to require additional inpatient hospital stay due to Small bowel obstruction  Discharge Plan: Anticipate discharge in 48-72 hrs to rehab facility  Code Status: Level 1 - Full Code    Subjective:   Patient was seen and evaluated bedside  Complaining of bloating after eating    No flatus today, no bowel movement since yesterday morning    Objective:     Vitals:   Temp (24hrs), Av 5 °F (36 9 °C), Min:98 °F (36 7 °C), Max:98 8 °F (37 1 °C)    Temp:  [98 °F (36 7 °C)-98 8 °F (37 1 °C)] 98 8 °F (37 1 °C)  HR:  [61-70] 70  Resp:  [16-18] 18  BP: (109-128)/(68-77) 118/77  SpO2:  [94 %-97 %] 95 %  Body mass index is 30 2 kg/m²  Input and Output Summary (last 24 hours): Intake/Output Summary (Last 24 hours) at 3/28/2023 1034  Last data filed at 3/28/2023 0840  Gross per 24 hour   Intake 418 ml   Output 3450 ml   Net -3032 ml       Physical Exam:   Physical Exam  Constitutional:       General: She is not in acute distress  HENT:      Head: Atraumatic  Cardiovascular:      Rate and Rhythm: Normal rate and regular rhythm  Heart sounds: No murmur heard  No friction rub  No gallop  Pulmonary:      Effort: Pulmonary effort is normal  No respiratory distress  Breath sounds: Normal breath sounds  No wheezing  Abdominal:      General: Bowel sounds are normal  There is distension  Palpations: Abdomen is soft  Tenderness: There is abdominal tenderness  Musculoskeletal:         General: No swelling  Cervical back: Neck supple  Skin:     General: Skin is warm and dry  Neurological:      Mental Status: She is alert  Comments: Tardive dyskinesia, tongue rolling   Psychiatric:         Mood and Affect: Mood normal         Additional Data:     Labs:  Results from last 7 days   Lab Units 03/28/23 0534 03/26/23  0540 03/25/23  0441   WBC Thousand/uL 2 98*   < > 3 30*   HEMOGLOBIN g/dL 10 7*   < > 11 4*   HEMATOCRIT % 32 3*   < > 35 8   PLATELETS Thousands/uL 261   < > 264   BANDS PCT %  --   --  5   NEUTROS PCT % 40*  --   --    LYMPHS PCT % 41  --   --    LYMPHO PCT %  --   --  27   MONOS PCT % 13*  --   --    MONO PCT %  --   --  12   EOS PCT % 4  --  2    < > = values in this interval not displayed       Results from last 7 days   Lab Units 03/28/23 0534 03/26/23  0540 03/25/23  0441   SODIUM mmol/L 142   < > 144   POTASSIUM mmol/L 3 2*   < > 3 6   CHLORIDE mmol/L 111*   < > 113*   CO2 mmol/L 26   < > 24   BUN mg/dL 3*   < > 29*   CREATININE mg/dL 0 63   < > 0 97   ANION GAP mmol/L 5   < > 7   CALCIUM mg/dL 7 9*   < > 7 9*   ALBUMIN g/dL  --   --  3 5   TOTAL BILIRUBIN mg/dL  --   --  1 44*   ALK PHOS U/L  --   --  55   ALT U/L  --   --  7   AST U/L  --   --  10*   GLUCOSE RANDOM mg/dL 72   < > 110    < > = values in this interval not displayed  Results from last 7 days   Lab Units 03/23/23  2157   INR  0 93             Results from last 7 days   Lab Units 03/24/23  0055 03/23/23 2157   LACTIC ACID mmol/L 1 9 5 2*       Lines/Drains:  Invasive Devices     Peripheral Intravenous Line  Duration           Peripheral IV 03/27/23 Dorsal (posterior); Left Hand 1 day                      Imaging: Reviewed radiology reports from this admission including: abdominal/pelvic CT and xray(s)    Recent Cultures (last 7 days):         Last 24 Hours Medication List:   Current Facility-Administered Medications   Medication Dose Route Frequency Provider Last Rate   • acetaminophen  650 mg Oral Q6H PRN Mary Grams, PA-C     • aluminum-magnesium hydroxide-simethicone  30 mL Oral Q6H PRN Mary Grams, PA-C     • amLODIPine  10 mg Oral Daily Mary Grams, PA-C     • aspirin  81 mg Oral Daily Mary Grams, PA-C     • atorvastatin  40 mg Oral After Abby Rey, PA-C     • benztropine  1 mg Oral BID Mary Grams, PA-C     • busPIRone  20 mg Oral TID Mary Grams, PA-C     • enoxaparin  40 mg Subcutaneous Q24H [facility] Mike Orona MD     • ferrous sulfate  325 mg Oral Daily With Breakfast Mary Grams, PA-C     • folic acid  6,624 mcg Oral Daily Mary Grams, PA-C     • HYDROmorphone  0 5 mg Intravenous Q4H PRN Mike Orona MD     • hydrOXYzine HCL  50 mg Oral TID PRN Mary Grams, PA-C     • lithium carbonate  300 mg Oral HS Mary Grams, PA-C     • LORazepam  0 5 mg Oral BID PRN Mary Grams, PA-C     • magnesium sulfate  2 g Intravenous Once Shazia Trujillo MD     • mirtazapine  7 5 mg Oral QPM Ragini Loyd PA-C     • ondansetron  4 mg Intravenous Q6H PRN Ragini Loyd PA-C     • oxyCODONE  5 mg Oral Q4H PRN Divine Kapoor MD     • pantoprazole  40 mg Intravenous Q24H Veronica Atkinson MD     • PARoxetine  30 mg Oral BID Ragini Loyd PA-C     • PARoxetine  60 mg Oral HS Ragini Loyd PA-C     • potassium chloride  20 mEq Intravenous Once Sherri King MD     • potassium chloride  20 mEq Intravenous Once Sherri King MD 20 mEq (03/28/23 0837)   • prazosin  2 mg Oral HS Ragini Loyd PA-C     • pregabalin  50 mg Oral BID Ragini Loyd PA-C     • QUEtiapine  50 mg Oral TID Ragini Loyd PA-C     • sodium chloride  75 mL/hr Intravenous Continuous Divine Kapoor MD 75 mL/hr (03/27/23 2121)   • Valbenazine Tosylate  40 mg Oral Daily Ragini Loyd PA-C          Today, Patient Was Seen By: Sherri King MD    **Please Note: This note may have been constructed using a voice recognition system  **

## 2023-03-28 NOTE — OCCUPATIONAL THERAPY NOTE
Occupational Therapy Progress Note     Patient Name: Brooklyn Patton  [id number] Date: 3/28/2023  Problem List  Principal Problem:    Small bowel obstruction (HCC)  Active Problems:    Chronic pain disorder    Bipolar II disorder ([facility] [address] )    Essential hypertension    Tardive dyskinesia    Mixed hyperlipidemia    Hypokalemia    Anemia    Memory loss       03/28/23 1338   OT Last Visit   OT Visit Date 03/28/23   Note Type   Note Type Treatment for insurance authorization   Pain Assessment   Pain Assessment Tool 0-10   Pain Score 6   Pain Location/Orientation Location: Abdomen   Hospital Pain Intervention(s) Repositioned; Ambulation/increased activity; Emotional support   Restrictions/Precautions   Weight Bearing Precautions Per Order No   Other Precautions Cognitive;Multiple lines;Telemetry; Fall Risk;Pain   ADL   Where Assessed Chair   Grooming Assistance 5  Supervision/Setup   Grooming Deficit Steadying;Supervision/safety; Increased time to complete   Grooming Comments standing at sink   Toileting Assistance  3  Moderate Assistance   Toileting Deficit Setup;Steadying;Verbal cueing;Supervison/safety; Increased time to complete; Bedside commode;Clothing management up;Clothing management down;Perineal hygiene   Bed Mobility   Sit to Supine 3  Moderate assistance   Additional items Assist x 1; Increased time required;Verbal cues;LE management   Additional Comments VCs for technique   Transfers   Sit to Stand 4  Minimal assistance   Additional items Assist x 1; Increased time required;Verbal cues; Other  (RW)   Stand to Sit 4  Minimal assistance   Additional items Assist x 1; Increased time required;Verbal cues; Other  (RW)   Toilet transfer   (CGA)   Additional items Assist x 1; Increased time required;Standard toilet; Other;Armrests  (RW, grab bar)   Functional Mobility   Functional Mobility 4  Minimal assistance   Additional Comments requires frequent rest breaks 2* pain/fatigue   Additional items Rolling walker   Cognition   Overall Cognitive Status Impaired   Arousal/Participation Alert; Responsive; Cooperative   Attention Attends with cues to redirect   Orientation Level Oriented to person;Oriented to place;Oriented to situation;Disoriented to time   Memory Decreased recall of precautions;Decreased recall of recent events;Decreased short term memory   Following Commands Follows one step commands with increased time or repetition   Assessment   Assessment Patient participated in skilled OT session this date with interventions consisting of therapeutic activities self-care/ADL training to increase B UE strength, functional endurance/activity tolerance, static/dynamic sitting/standing balance, and overall safety awareness to increase (I) with ADLs/IADLs to return to PLOF  Provided education on energy conservation techniques, deep breathing techniques, fall prevention strategies, and overall safety awareness  Patient agreeable to OT treatment session, upon arrival patient was found seated OOB to Recliner  Patient requiring verbal cues for safety and verbal cues for pacing through activity steps  Please refer to flowsheet for functional performance  Patient continues to be functioning below baseline level, occupational performance remains limited secondary to factors listed above and increased risk for falls and injury  Pt left /supine in bed, alarm armed and call bell and bedside table within reach; all needs met  Plan   Treatment Interventions ADL retraining;Functional transfer training;UE strengthening/ROM; Endurance training;Cognitive reorientation;Patient/family training; Compensatory technique education;Continued evaluation   Goal Expiration Date 04/07/23   OT Treatment Day 1   OT Frequency 2-3x/wk   Recommendation   OT Discharge Recommendation Post acute rehabilitation services   AM-PAC Daily Activity Inpatient   Lower Body Dressing 2   Bathing 2   Toileting 3   Upper Body Dressing 3   Grooming 4   Eating 4   Daily Activity Raw Score 18 Daily Activity Standardized Score (Calc for Raw Score >=11) 38 66   AM-PAC Applied Cognition Inpatient   Following a Speech/Presentation 3   Understanding Ordinary Conversation 3   Taking Medications 2   Remembering Where Things Are Placed or Put Away 2   Remembering List of 4-5 Errands 2   Taking Care of Complicated Tasks 2   Applied Cognition Raw Score 14   Applied Cognition Standardized Score 32 02     Josh Mix

## 2023-03-28 NOTE — NURSING NOTE
Per [facility] patient to remain NPO until CT scan is read due to increasing distension and bloating sensation reported by patient

## 2023-03-29 ENCOUNTER — APPOINTMENT (INPATIENT)
Dept: RADIOLOGY | Facility: HOSPITAL | Age: 58
End: 2023-03-29
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
## 2023-03-29 LAB
ALBUMIN SERPL BCP-MCNC: 3.4 G/DL (ref 3.5–5)
ALP SERPL-CCNC: 51 U/L (ref 34–104)
ALT SERPL W P-5'-P-CCNC: 7 U/L (ref 7–52)
ANION GAP SERPL CALCULATED.3IONS-SCNC: 7 MMOL/L (ref 4–13)
AST SERPL W P-5'-P-CCNC: 9 U/L (ref 13–39)
BASOPHILS # BLD AUTO: 0.03 THOUSANDS/ÂΜL (ref 0–0.1)
BASOPHILS NFR BLD AUTO: 1 % (ref 0–1)
BILIRUB SERPL-MCNC: 0.98 MG/DL (ref 0.2–1)
BUN SERPL-MCNC: 2 MG/DL (ref 5–25)
CALCIUM ALBUM COR SERPL-MCNC: 8.6 MG/DL (ref 8.3–10.1)
CALCIUM SERPL-MCNC: 8.1 MG/DL (ref 8.4–10.2)
CHLORIDE SERPL-SCNC: 110 MMOL/L (ref 96–108)
CO2 SERPL-SCNC: 25 MMOL/L (ref 21–32)
CREAT SERPL-MCNC: 0.62 MG/DL (ref 0.6–1.3)
EOSINOPHIL # BLD AUTO: 0.2 THOUSAND/ÂΜL (ref 0–0.61)
EOSINOPHIL NFR BLD AUTO: 5 % (ref 0–6)
ERYTHROCYTE [DISTWIDTH] IN BLOOD BY AUTOMATED COUNT: 14.5 % (ref 11.6–15.1)
GFR SERPL CREATININE-BSD FRML MDRD: 99 ML/MIN/1.73SQ M
GLUCOSE SERPL-MCNC: 75 MG/DL (ref 65–140)
HCT VFR BLD AUTO: 34.6 % (ref 34.8–46.1)
HGB BLD-MCNC: 11.3 G/DL (ref 11.5–15.4)
IMM GRANULOCYTES # BLD AUTO: 0.01 THOUSAND/UL (ref 0–0.2)
IMM GRANULOCYTES NFR BLD AUTO: 0 % (ref 0–2)
LYMPHOCYTES # BLD AUTO: 1.4 THOUSANDS/ÂΜL (ref 0.6–4.47)
LYMPHOCYTES NFR BLD AUTO: 38 % (ref 14–44)
MAGNESIUM SERPL-MCNC: 2.1 MG/DL (ref 1.9–2.7)
MCH RBC QN AUTO: 29.3 PG (ref 26.8–34.3)
MCHC RBC AUTO-ENTMCNC: 32.7 G/DL (ref 31.4–37.4)
MCV RBC AUTO: 90 FL (ref 82–98)
MONOCYTES # BLD AUTO: 0.39 THOUSAND/ÂΜL (ref 0.17–1.22)
MONOCYTES NFR BLD AUTO: 11 % (ref 4–12)
NEUTROPHILS # BLD AUTO: 1.67 THOUSANDS/ÂΜL (ref 1.85–7.62)
NEUTS SEG NFR BLD AUTO: 45 % (ref 43–75)
NRBC BLD AUTO-RTO: 0 /100 WBCS
PLATELET # BLD AUTO: 264 THOUSANDS/UL (ref 149–390)
PMV BLD AUTO: 8.8 FL (ref 8.9–12.7)
POTASSIUM SERPL-SCNC: 3.5 MMOL/L (ref 3.5–5.3)
PROT SERPL-MCNC: 5.8 G/DL (ref 6.4–8.4)
RBC # BLD AUTO: 3.86 MILLION/UL (ref 3.81–5.12)
SODIUM SERPL-SCNC: 142 MMOL/L (ref 135–147)
WBC # BLD AUTO: 3.7 THOUSAND/UL (ref 4.31–10.16)

## 2023-03-29 RX ORDER — POTASSIUM CHLORIDE 14.9 MG/ML
20 INJECTION INTRAVENOUS ONCE
Status: COMPLETED | OUTPATIENT
Start: 2023-03-29 | End: 2023-03-30

## 2023-03-29 RX ORDER — POTASSIUM CHLORIDE 29.8 MG/ML
40 INJECTION INTRAVENOUS ONCE
Status: DISCONTINUED | OUTPATIENT
Start: 2023-03-29 | End: 2023-03-29 | Stop reason: SDUPTHER

## 2023-03-29 RX ORDER — POTASSIUM CHLORIDE 14.9 MG/ML
20 INJECTION INTRAVENOUS ONCE
Status: COMPLETED | OUTPATIENT
Start: 2023-03-29 | End: 2023-03-29

## 2023-03-29 RX ADMIN — ASPIRIN 81 MG: 81 TABLET, COATED ORAL at 08:51

## 2023-03-29 RX ADMIN — POTASSIUM CHLORIDE 20 MEQ: 14.9 INJECTION, SOLUTION INTRAVENOUS at 20:01

## 2023-03-29 RX ADMIN — QUETIAPINE FUMARATE 50 MG: 25 TABLET ORAL at 18:37

## 2023-03-29 RX ADMIN — FOLIC ACID 1000 MCG: 1 TABLET ORAL at 08:50

## 2023-03-29 RX ADMIN — LITHIUM CARBONATE 300 MG: 300 TABLET, FILM COATED, EXTENDED RELEASE ORAL at 21:16

## 2023-03-29 RX ADMIN — PREGABALIN 50 MG: 50 CAPSULE ORAL at 18:37

## 2023-03-29 RX ADMIN — MIRTAZAPINE 7.5 MG: 15 TABLET, FILM COATED ORAL at 18:37

## 2023-03-29 RX ADMIN — BUSPIRONE HYDROCHLORIDE 20 MG: 10 TABLET ORAL at 21:16

## 2023-03-29 RX ADMIN — OXYCODONE 5 MG: 5 TABLET ORAL at 04:23

## 2023-03-29 RX ADMIN — ENOXAPARIN SODIUM 40 MG: 100 INJECTION SUBCUTANEOUS at 08:49

## 2023-03-29 RX ADMIN — PREGABALIN 50 MG: 50 CAPSULE ORAL at 08:50

## 2023-03-29 RX ADMIN — SODIUM CHLORIDE 75 ML/HR: 0.9 INJECTION, SOLUTION INTRAVENOUS at 03:06

## 2023-03-29 RX ADMIN — BENZTROPINE MESYLATE 1 MG: 1 TABLET ORAL at 08:50

## 2023-03-29 RX ADMIN — PRAZOSIN HYDROCHLORIDE 2 MG: 2 CAPSULE ORAL at 21:16

## 2023-03-29 RX ADMIN — PAROXETINE 30 MG: 20 TABLET, FILM COATED ORAL at 08:50

## 2023-03-29 RX ADMIN — PANTOPRAZOLE SODIUM 40 MG: 40 INJECTION, POWDER, FOR SOLUTION INTRAVENOUS at 08:49

## 2023-03-29 RX ADMIN — LORAZEPAM 0.5 MG: 1 TABLET ORAL at 18:43

## 2023-03-29 RX ADMIN — PAROXETINE 60 MG: 20 TABLET, FILM COATED ORAL at 21:14

## 2023-03-29 RX ADMIN — QUETIAPINE FUMARATE 50 MG: 25 TABLET ORAL at 21:15

## 2023-03-29 RX ADMIN — OXYCODONE 5 MG: 5 TABLET ORAL at 08:49

## 2023-03-29 RX ADMIN — BUSPIRONE HYDROCHLORIDE 20 MG: 10 TABLET ORAL at 18:37

## 2023-03-29 RX ADMIN — PAROXETINE 30 MG: 20 TABLET, FILM COATED ORAL at 18:37

## 2023-03-29 RX ADMIN — BENZTROPINE MESYLATE 1 MG: 1 TABLET ORAL at 18:37

## 2023-03-29 RX ADMIN — BUSPIRONE HYDROCHLORIDE 20 MG: 10 TABLET ORAL at 08:51

## 2023-03-29 RX ADMIN — QUETIAPINE FUMARATE 50 MG: 25 TABLET ORAL at 08:51

## 2023-03-29 RX ADMIN — SODIUM CHLORIDE 75 ML/HR: 0.9 INJECTION, SOLUTION INTRAVENOUS at 19:52

## 2023-03-29 RX ADMIN — FERROUS SULFATE TAB 325 MG (65 MG ELEMENTAL FE) 325 MG: 325 (65 FE) TAB at 08:50

## 2023-03-29 RX ADMIN — POTASSIUM CHLORIDE 20 MEQ: 14.9 INJECTION, SOLUTION INTRAVENOUS at 22:11

## 2023-03-29 RX ADMIN — ATORVASTATIN CALCIUM 40 MG: 40 TABLET, FILM COATED ORAL at 18:37

## 2023-03-29 RX ADMIN — AMLODIPINE BESYLATE 10 MG: 10 TABLET ORAL at 08:50

## 2023-03-29 NOTE — NURSING NOTE
Patient called to use bathroom, PCA Gene Levels took patient to bathroom and remained with patient while voiding  Patient was stood with assistance and after washing hands patient suddenly became weak and started to fall  PCA lowered patient to the ground with assistance  Patient did not hit head or have any injuries and reported no pain after assisted fall  Dr Maya Arceo with [facility] at bedside and assisted this RN, Hay Lucas, and PCA with standing patient and placing her into recliner  Chair alarm turned on, vitals stable, no s/s head or spinal cord injury  Due to patient having a history of knee replacements, XRs ordered to assess knees and hips, currently pending  Call bell and belongings within each, patient A&O x4 and instructed to call for assistance  Patient is normally always compliant with calling for assistance OOB  Patient has no complaints or discomfort

## 2023-03-29 NOTE — PLAN OF CARE
Problem: Potential for Falls  Goal: Patient will remain free of falls  Description: INTERVENTIONS:  - Educate patient/family on patient safety including physical limitations  - Instruct patient to call for assistance with activity   - Consult OT/PT to assist with strengthening/mobility   - Keep Call bell within reach  - Keep bed low and locked with side rails adjusted as appropriate  - Keep care items and personal belongings within reach  - Initiate and maintain comfort rounds  - Make Fall Risk Sign visible to staff  - Apply yellow socks and bracelet for high fall risk patients  - Consider moving patient to room near nurses station  Outcome: Progressing     Problem: PAIN - ADULT  Goal: Verbalizes/displays adequate comfort level or baseline comfort level  Description: Interventions:  - Encourage patient to monitor pain and request assistance  - Assess pain using appropriate pain scale  - Administer analgesics based on type and severity of pain and evaluate response  - Implement non-pharmacological measures as appropriate and evaluate response  - Consider cultural and social influences on pain and pain management  - Notify physician/advanced practitioner if interventions unsuccessful or patient reports new pain  Outcome: Progressing     Problem: INFECTION - ADULT  Goal: Absence or prevention of progression during hospitalization  Description: INTERVENTIONS:  - Assess and monitor for signs and symptoms of infection  - Monitor lab/diagnostic results  - Monitor all insertion sites, i e  indwelling lines, tubes, and drains  - Monitor endotracheal if appropriate and nasal secretions for changes in amount and color  - [place] appropriate cooling/warming therapies per order  - Administer medications as ordered  - Instruct and encourage patient and family to use good hand hygiene technique  - Identify and instruct in appropriate isolation precautions for identified infection/condition  Outcome: Progressing     Problem: SAFETY ADULT  Goal: Patient will remain free of falls  Description: INTERVENTIONS:  - Educate patient/family on patient safety including physical limitations  - Instruct patient to call for assistance with activity   - Consult OT/PT to assist with strengthening/mobility   - Keep Call bell within reach  - Keep bed low and locked with side rails adjusted as appropriate  - Keep care items and personal belongings within reach  - Initiate and maintain comfort rounds  - Make Fall Risk Sign visible to staff  - Apply yellow socks and bracelet for high fall risk patients  - Consider moving patient to room near nurses station  Outcome: Progressing  Goal: Maintain or return to baseline ADL function  Description: INTERVENTIONS:  -  Assess patient's ability to carry out ADLs; assess patient's baseline for ADL function and identify physical deficits which impact ability to perform ADLs (bathing, care of mouth/teeth, toileting, grooming, dressing, etc )  - Assess/evaluate cause of self-care deficits   - Assess range of motion  - Assess patient's mobility; develop plan if impaired  - Assess patient's need for assistive devices and provide as appropriate  - Encourage maximum independence but intervene and supervise when necessary  - Involve family in performance of ADLs  - Assess for home care needs following discharge   - Consider OT consult to assist with ADL evaluation and planning for discharge  - Provide patient education as appropriate  Outcome: Progressing  Goal: Maintains/Returns to pre admission functional level  Description: INTERVENTIONS:  - Perform BMAT or MOVE assessment daily    - Set and communicate daily mobility goal to care team and patient/family/caregiver     - Collaborate with rehabilitation services on mobility goals if consulted  - Out of bed for toileting  - Record patient progress and toleration of activity level   Outcome: Progressing     Problem: DISCHARGE PLANNING  Goal: Discharge to home or other facility with appropriate resources  Description: INTERVENTIONS:  - Identify barriers to discharge w/patient and caregiver  - Arrange for needed discharge resources and transportation as appropriate  - Identify discharge learning needs (meds, wound care, etc )  - Arrange for interpretive services to assist at discharge as needed  - Refer to Case Management Department for coordinating discharge planning if the patient needs post-hospital services based on physician/advanced practitioner order or complex needs related to functional status, cognitive ability, or social support system  Outcome: Progressing     Problem: Knowledge Deficit  Goal: Patient/family/caregiver demonstrates understanding of disease process, treatment plan, medications, and discharge instructions  Description: Complete learning assessment and assess knowledge base    Interventions:  - Provide teaching at level of understanding  - Provide teaching via preferred learning methods  Outcome: Progressing     Problem: MOBILITY - ADULT  Goal: Maintain or return to baseline ADL function  Description: INTERVENTIONS:  -  Assess patient's ability to carry out ADLs; assess patient's baseline for ADL function and identify physical deficits which impact ability to perform ADLs (bathing, care of mouth/teeth, toileting, grooming, dressing, etc )  - Assess/evaluate cause of self-care deficits   - Assess range of motion  - Assess patient's mobility; develop plan if impaired  - Assess patient's need for assistive devices and provide as appropriate  - Encourage maximum independence but intervene and supervise when necessary  - Involve family in performance of ADLs  - Assess for home care needs following discharge   - Consider OT consult to assist with ADL evaluation and planning for discharge  - Provide patient education as appropriate  Outcome: Progressing  Goal: Maintains/Returns to pre admission functional level  Description: INTERVENTIONS:  - Perform BMAT or MOVE assessment daily    - Set and communicate daily mobility goal to care team and patient/family/caregiver     - Collaborate with rehabilitation services on mobility goals if consulted  - Out of bed for toileting  - Record patient progress and toleration of activity level   Outcome: Progressing

## 2023-03-29 NOTE — PLAN OF CARE
Problem: Potential for Falls  Goal: Patient will remain free of falls  Description: INTERVENTIONS:  - Educate patient/family on patient safety including physical limitations  - Instruct patient to call for assistance with activity   - Consult OT/PT to assist with strengthening/mobility   - Keep Call bell within reach  - Keep bed low and locked with side rails adjusted as appropriate  - Keep care items and personal belongings within reach  - Initiate and maintain comfort rounds  - Make Fall Risk Sign visible to staff  - Offer Toileting every 2 Hours, in advance of need  - Initiate/Maintain bed alarm  - Obtain necessary fall risk management equipment:   - Apply yellow socks and bracelet for high fall risk patients  - Consider moving patient to room near nurses station  Outcome: Progressing     Problem: PAIN - ADULT  Goal: Verbalizes/displays adequate comfort level or baseline comfort level  Description: Interventions:  - Encourage patient to monitor pain and request assistance  - Assess pain using appropriate pain scale  - Administer analgesics based on type and severity of pain and evaluate response  - Implement non-pharmacological measures as appropriate and evaluate response  - Consider cultural and social influences on pain and pain management  - Notify physician/advanced practitioner if interventions unsuccessful or patient reports new pain  Outcome: Progressing     Problem: INFECTION - ADULT  Goal: Absence or prevention of progression during hospitalization  Description: INTERVENTIONS:  - Assess and monitor for signs and symptoms of infection  - Monitor lab/diagnostic results  - Monitor all insertion sites, i e  indwelling lines, tubes, and drains  - Monitor endotracheal if appropriate and nasal secretions for changes in amount and color  - [place] appropriate cooling/warming therapies per order  - Administer medications as ordered  - Instruct and encourage patient and family to use good hand hygiene technique  - Identify and instruct in appropriate isolation precautions for identified infection/condition  Outcome: Progressing     Problem: SAFETY ADULT  Goal: Maintain or return to baseline ADL function  Description: INTERVENTIONS:  -  Assess patient's ability to carry out ADLs; assess patient's baseline for ADL function and identify physical deficits which impact ability to perform ADLs (bathing, care of mouth/teeth, toileting, grooming, dressing, etc )  - Assess/evaluate cause of self-care deficits   - Assess range of motion  - Assess patient's mobility; develop plan if impaired  - Assess patient's need for assistive devices and provide as appropriate  - Encourage maximum independence but intervene and supervise when necessary  - Involve family in performance of ADLs  - Assess for home care needs following discharge   - Consider OT consult to assist with ADL evaluation and planning for discharge  - Provide patient education as appropriate  Outcome: Progressing     Problem: SAFETY ADULT  Goal: Maintains/Returns to pre admission functional level  Description: INTERVENTIONS:  - Perform BMAT or MOVE assessment daily    - Set and communicate daily mobility goal to care team and patient/family/caregiver  - Collaborate with rehabilitation services on mobility goals if consulted  - Perform Range of Motion   - Reposition patient every 2 hours    - Dangle patient   - Stand patient   - Ambulate patient   - Out of bed to chair   - Out of bed for meals   - Out of bed for toileting  - Record patient progress and toleration of activity level   Outcome: Progressing     Problem: DISCHARGE PLANNING  Goal: Discharge to home or other facility with appropriate resources  Description: INTERVENTIONS:  - Identify barriers to discharge w/patient and caregiver  - Arrange for needed discharge resources and transportation as appropriate  - Identify discharge learning needs (meds, wound care, etc )  - Arrange for interpretive services to assist at discharge as needed  - Refer to Case Management Department for coordinating discharge planning if the patient needs post-hospital services based on physician/advanced practitioner order or complex needs related to functional status, cognitive ability, or social support system  Outcome: Progressing     Problem: Knowledge Deficit  Goal: Patient/family/caregiver demonstrates understanding of disease process, treatment plan, medications, and discharge instructions  Description: Complete learning assessment and assess knowledge base    Interventions:  - Provide teaching at level of understanding  - Provide teaching via preferred learning methods  Outcome: Progressing

## 2023-03-29 NOTE — PROGRESS NOTES
"Progress Note - General Surgery   Samantha Jackson 59 y o  female MRN: [id number]  Unit/Bed#: E5 -01 Encounter: [id number]    Assessment:  56yoF p/w enteritis vs partial SBO/ileus    3/28 CT A/P w PO: Ileus versus partial SBO, partially decompressed distal SB loops without transition point, contrast present throughout small bowel, dilute contrast in right colon    Vital stable, afebrile  WBC 3 7 from 2 90  Hemoglobin 11 3 from 10 7  Creatinine 0 62    UOP 3500    Plan:  -will obtain obstructive series this morning to assess contrast passage into the colon  -CLD, will advance diet depending on obstructive series results  -[email or web address]  -Pain control  -Encourage ambulation  -Lovenox  -Care per primary team    Subjective/Objective   Subjective:   Patient reports some continued abdominal pain, tolerating clears without nausea or emesis, states she is not passing flatus or had a bowel movement in over 24 hours, voiding without issues  Objective:     Blood pressure 117/77, pulse 62, temperature 98 3 °F (36 8 °C), resp  rate 16, height 5' 1\" (1 549 m), weight 72 5 kg (159 lb 13 3 oz), SpO2 92 %, not currently breastfeeding  ,Body mass index is 30 2 kg/m²  Intake/Output Summary (Last 24 hours) at 3/29/2023 0637  Last data filed at 3/29/2023 0450  Gross per 24 hour   Intake 1280 ml   Output 3500 ml   Net -2220 ml       Invasive Devices     Peripheral Intravenous Line  Duration           Peripheral IV 03/27/23 Dorsal (posterior); Left Hand 2 days                Physical Exam:   General: NAD  Skin: Warm, dry, anicteric  HEENT: Normocephalic, atraumatic  CV: RRR, no m/r/g  Pulm: CTA b/l, no inc WOB  Abd: Soft, distended, minimally tender to palpation diffusely  MSK: Symmetric, no edema, no tenderness, no deformity  Neuro: AOx3, GCS 15    Lab, Imaging and other studies:  I have personally reviewed pertinent lab results    , CBC:   Lab Results   Component Value Date    WBC 3 70 (L) 03/29/2023    HGB 11 3 (L) 03/29/2023    HCT " 34 6 (L) 03/29/2023    MCV 90 03/29/2023     03/29/2023    MCH 29 3 03/29/2023    MCHC 32 7 03/29/2023    RDW 14 5 03/29/2023    MPV 8 8 (L) 03/29/2023    NRBC 0 03/29/2023   , CMP:   Lab Results   Component Value Date    SODIUM 142 03/29/2023    K 3 5 03/29/2023     (H) 03/29/2023    CO2 25 03/29/2023    BUN 2 (L) 03/29/2023    CREATININE 0 62 03/29/2023    CALCIUM 8 1 (L) 03/29/2023    AST 9 (L) 03/29/2023    ALT 7 03/29/2023    ALKPHOS 51 03/29/2023    EGFR 99 03/29/2023     VTE Pharmacologic Prophylaxis: Sequential compression device (Venodyne)  and Enoxaparin (Lovenox)  VTE Mechanical Prophylaxis: sequential compression device

## 2023-03-29 NOTE — PROGRESS NOTES
[address] [place] [address]  Progress Note  Name: Saba Romero  MRN: [id number]  Unit/Bed#: E5 -01 I Date of Admission: 3/23/2023   Date of Service: 3/29/2023 I Hospital Day: 5    Assessment/Plan   * Small bowel obstruction [facility])  Assessment & Plan  Patient presented to the ED following multiple episodes of vomiting at home x one day  She reports two episodes of diarrhea at home, patient had multiple episodes of vomiting in the ED      · CT abdomen/pelvis showed mid to distal high-grade small bowel obstruction possibly due to adhesions  · Lactic acid 5 2-->1 9 following 2L fluids in ED  · Surgery consultation appreciated  · Surgery recommended NGT however attempts unsuccessful after which patient refused  · 3/25 patient was initially advanced to clear liquid diet, however had some vomiting overnight, diet was changed to n p o    · 3/26 repeat KUB showed small bowel obstruction without change  · 3/27 started on clear liquid diet, had a bowel movement  · 3/28 morning patient complaining of no flatulence, no bowel movement, distension after eating  · 3/28 CT abdomen pelvis with p o  contrast showed ileus versus partial small bowel obstruction, partially decompressed distal small bowel loops without evidence of discrete transition point  · Stricture series this morning showed contrast in right colon  · Continue clear liquid diet  · Encourage ambulation  · Pain control  · IV fluids    Fall  Assessment & Plan  · Patient was watching herself at the scene, became dizzy, before falling down PCA who was next to her caught her and lowered down on her right side, denies hitting her head  · No obvious injury  · Vitals were stable after the event  · We will order x-ray of the right hip and right knee    Memory loss  Assessment & Plan  · Patient not great historian   · Followed outpatient by neurology for this issue   · Scheduled to follow-up outpatient with neuropsych after ENT hearing evaluation completed    Anemia  Assessment & Plan  · Her last hemoglobin was around 13  · Hemoglobin on admission 16, received aggressive IV hydration, all saline drops  · No signs of bleeding  · Continue to monitor    Hypokalemia  Assessment & Plan  · Resolved    Mixed hyperlipidemia  Assessment & Plan  · Continue statin    Tardive dyskinesia  Assessment & Plan  · Patient with chronic tardive dyskinesia secondary to psychiatric medications  · Continue 1 mg cogentin b i d and 40 mg ingrezza daily    Essential hypertension  Assessment & Plan  · Continue amlodipine     Bipolar II disorder ([address] )  Assessment & Plan  · Patient mood is stable with tardive dyskinesia at baseline  · Continue home medication regimen  · 50 mg seroquel t i d  · 300 mg lithium h s  · 30 mg paxil b i d  · 60 mg paxil h s   · 20 mg buspar t i d  · 40 mg ingrezza daily  · 7 5 mg remeron h s   · 50 mg hydroxyzine b i d prn    Chronic pain disorder  Assessment & Plan  · Continue 50 mg pregabalin b i d             VTE Pharmacologic Prophylaxis: VTE Score: 2 Lovenox    Patient Centered Rounds: I performed bedside rounds with nursing staff today  Discussions with Specialists or Other Care Team Provider: Nursing, case management, surgery    Education and Discussions with Family / Patient: Attempted to update  (daughter) via phone  Unable to contact  Total Time Spent on Date of Encounter in care of patient: 35 minutes This time was spent on one or more of the following: performing physical exam; counseling and coordination of care; obtaining or reviewing history; documenting in the medical record; reviewing/ordering tests, medications or procedures; communicating with other healthcare professionals and discussing with patient's family/caregivers      Current Length of Stay: 5 day(s)  Current Patient Status: Inpatient   Certification Statement: The patient will continue to require additional inpatient hospital stay due to Small bowel obstruction  Discharge Plan: Anticipate discharge in 48-72 hrs to rehab facility  Code Status: Level 1 - Full Code    Subjective:   Patient was seen and evaluated bedside earlier today  She is hungry, but still gets slightly distended after eating  Reevaluated later in the day, patient was washing up in the bathroom in front of the sink, felt dizzy, PCA grabbed her and lower down to the floor denies head strike, she slightly bumped her right leg  She was able to weight-bear on her leg stand up and sit in the recliner  Vitals were checked, stable  No obvious signs of injury  Objective:     Vitals:   Temp (24hrs), Av 8 °F (37 1 °C), Min:98 3 °F (36 8 °C), Max:99 6 °F (37 6 °C)    Temp:  [98 3 °F (36 8 °C)-99 6 °F (37 6 °C)] 99 6 °F (37 6 °C)  HR:  [62-88] 88  Resp:  [16-18] 17  BP: (111-138)/(64-78) 138/77  SpO2:  [92 %-98 %] 98 %  Body mass index is 30 2 kg/m²  Input and Output Summary (last 24 hours): Intake/Output Summary (Last 24 hours) at 3/29/2023 1901  Last data filed at 3/29/2023 1830  Gross per 24 hour   Intake 1150 ml   Output 2100 ml   Net -950 ml       Physical Exam:   Physical Exam  Constitutional:       General: She is not in acute distress  HENT:      Head: Atraumatic  Cardiovascular:      Rate and Rhythm: Normal rate and regular rhythm  Heart sounds: No murmur heard  No friction rub  No gallop  Pulmonary:      Effort: Pulmonary effort is normal  No respiratory distress  Breath sounds: Normal breath sounds  No wheezing  Abdominal:      General: Bowel sounds are normal  There is distension  Palpations: Abdomen is soft  Tenderness: There is abdominal tenderness  Musculoskeletal:         General: No swelling  Cervical back: Neck supple  Skin:     General: Skin is warm and dry  Neurological:      General: No focal deficit present  Mental Status: She is alert        Comments: TD-Tongue rolling   Psychiatric:         Mood and Affect: Mood normal           Additional Data:     Labs:  Results from last 7 days   Lab Units 03/29/23  0502 03/26/23  0540 03/25/23  0441   WBC Thousand/uL 3 70*   < > 3 30*   HEMOGLOBIN g/dL 11 3*   < > 11 4*   HEMATOCRIT % 34 6*   < > 35 8   PLATELETS Thousands/uL 264   < > 264   BANDS PCT %  --   --  5   NEUTROS PCT % 45   < >  --    LYMPHS PCT % 38   < >  --    LYMPHO PCT %  --   --  27   MONOS PCT % 11   < >  --    MONO PCT %  --   --  12   EOS PCT % 5   < > 2    < > = values in this interval not displayed  Results from last 7 days   Lab Units 03/29/23  0502   SODIUM mmol/L 142   POTASSIUM mmol/L 3 5   CHLORIDE mmol/L 110*   CO2 mmol/L 25   BUN mg/dL 2*   CREATININE mg/dL 0 62   ANION GAP mmol/L 7   CALCIUM mg/dL 8 1*   ALBUMIN g/dL 3 4*   TOTAL BILIRUBIN mg/dL 0 98   ALK PHOS U/L 51   ALT U/L 7   AST U/L 9*   GLUCOSE RANDOM mg/dL 75     Results from last 7 days   Lab Units 03/23/23  2157   INR  0 93             Results from last 7 days   Lab Units 03/24/23  0055 03/23/23  2157   LACTIC ACID mmol/L 1 9 5 2*       Lines/Drains:  Invasive Devices     Peripheral Intravenous Line  Duration           Peripheral IV 03/27/23 Dorsal (posterior); Left Hand 2 days                      Imaging: Reviewed radiology reports from this admission including: abdominal/pelvic CT    Recent Cultures (last 7 days):         Last 24 Hours Medication List:   Current Facility-Administered Medications   Medication Dose Route Frequency Provider Last Rate   • acetaminophen  650 mg Oral Q6H PRN Ok Davidson PA-C     • aluminum-magnesium hydroxide-simethicone  30 mL Oral Q6H PRN Ok Davidson PA-C     • amLODIPine  10 mg Oral Daily Ok Davidson PA-C     • aspirin  81 mg Oral Daily Ok Davidson PA-C     • atorvastatin  40 mg Oral After Dinner Ok Davidson PA-C     • benztropine  1 mg Oral BID Ok Davidson PA-C     • busPIRone  20 mg Oral TID Ok Davidson PA-C     • enoxaparin  40 mg Subcutaneous Q24H [address] Attiya Kaitlynn Martins MD     • ferrous sulfate  325 mg Oral Daily With Breakfast Mo Dye PA-C     • folic acid  6,238 mcg Oral Daily Mo Dye PA-C     • HYDROmorphone  0 5 mg Intravenous Q4H PRN Dolly Matt MD     • hydrOXYzine HCL  50 mg Oral TID PRN Mo Dye PA-C     • lithium carbonate  300 mg Oral HS Mo Dye PA-C     • LORazepam  0 5 mg Oral BID PRN Mo Dye PA-C     • mirtazapine  7 5 mg Oral QPM Mo Dye PA-C     • ondansetron  4 mg Intravenous Q6H PRN Mo Dye PA-C     • oxyCODONE  5 mg Oral Q4H PRN Dolly Matt MD     • pantoprazole  40 mg Intravenous Q24H Raf Avila MD     • PARoxetine  30 mg Oral BID Mo Dye PA-C     • PARoxetine  60 mg Oral HS Mo Dye PA-C     • prazosin  2 mg Oral HS Mo Dye PA-C     • pregabalin  50 mg Oral BID Mo Dye PA-C     • QUEtiapine  50 mg Oral TID Mo Dye PA-C     • sodium chloride  75 mL/hr Intravenous Continuous Dolly Matt MD 75 mL/hr (03/29/23 0306)   • Valbenazine Tosylate  40 mg Oral Daily Mo Dye PA-C          Today, Patient Was Seen By: Willis Hodgson MD    **Please Note: This note may have been constructed using a voice recognition system  **

## 2023-03-29 NOTE — ASSESSMENT & PLAN NOTE
· Patient was watching herself at the scene, became dizzy, before falling down PCA who was next to her caught her and lowered down on her right side, denies hitting her head  · No obvious injury  · Vitals were stable after the event  · We will order x-ray of the right hip and right knee

## 2023-03-30 ENCOUNTER — TELEPHONE (OUTPATIENT)
Dept: PSYCHIATRY | Facility: CLINIC | Age: 58
End: 2023-03-30

## 2023-03-30 LAB
ANION GAP SERPL CALCULATED.3IONS-SCNC: 6 MMOL/L (ref 4–13)
BUN SERPL-MCNC: 2 MG/DL (ref 5–25)
CALCIUM SERPL-MCNC: 7.3 MG/DL (ref 8.4–10.2)
CHLORIDE SERPL-SCNC: 115 MMOL/L (ref 96–108)
CO2 SERPL-SCNC: 23 MMOL/L (ref 21–32)
CREAT SERPL-MCNC: 0.68 MG/DL (ref 0.6–1.3)
ERYTHROCYTE [DISTWIDTH] IN BLOOD BY AUTOMATED COUNT: 14.6 % (ref 11.6–15.1)
GFR SERPL CREATININE-BSD FRML MDRD: 96 ML/MIN/1.73SQ M
GLUCOSE SERPL-MCNC: 68 MG/DL (ref 65–140)
HCT VFR BLD AUTO: 31.8 % (ref 34.8–46.1)
HGB BLD-MCNC: 10.3 G/DL (ref 11.5–15.4)
MCH RBC QN AUTO: 29.8 PG (ref 26.8–34.3)
MCHC RBC AUTO-ENTMCNC: 32.4 G/DL (ref 31.4–37.4)
MCV RBC AUTO: 92 FL (ref 82–98)
PLATELET # BLD AUTO: 265 THOUSANDS/UL (ref 149–390)
PMV BLD AUTO: 8.9 FL (ref 8.9–12.7)
POTASSIUM SERPL-SCNC: 3.4 MMOL/L (ref 3.5–5.3)
RBC # BLD AUTO: 3.46 MILLION/UL (ref 3.81–5.12)
SODIUM SERPL-SCNC: 144 MMOL/L (ref 135–147)
WBC # BLD AUTO: 3.8 THOUSAND/UL (ref 4.31–10.16)

## 2023-03-30 RX ORDER — POTASSIUM CHLORIDE 14.9 MG/ML
20 INJECTION INTRAVENOUS ONCE
Status: COMPLETED | OUTPATIENT
Start: 2023-03-30 | End: 2023-03-30

## 2023-03-30 RX ADMIN — HYDROMORPHONE HYDROCHLORIDE 0.5 MG: 1 INJECTION, SOLUTION INTRAMUSCULAR; INTRAVENOUS; SUBCUTANEOUS at 22:12

## 2023-03-30 RX ADMIN — QUETIAPINE FUMARATE 50 MG: 25 TABLET ORAL at 22:12

## 2023-03-30 RX ADMIN — BUSPIRONE HYDROCHLORIDE 20 MG: 10 TABLET ORAL at 22:12

## 2023-03-30 RX ADMIN — POTASSIUM CHLORIDE 20 MEQ: 14.9 INJECTION, SOLUTION INTRAVENOUS at 12:33

## 2023-03-30 RX ADMIN — ATORVASTATIN CALCIUM 40 MG: 40 TABLET, FILM COATED ORAL at 17:34

## 2023-03-30 RX ADMIN — ENOXAPARIN SODIUM 40 MG: 100 INJECTION SUBCUTANEOUS at 09:24

## 2023-03-30 RX ADMIN — OXYCODONE 5 MG: 5 TABLET ORAL at 05:35

## 2023-03-30 RX ADMIN — PAROXETINE 60 MG: 20 TABLET, FILM COATED ORAL at 22:12

## 2023-03-30 RX ADMIN — POTASSIUM CHLORIDE 20 MEQ: 14.9 INJECTION, SOLUTION INTRAVENOUS at 09:16

## 2023-03-30 RX ADMIN — BENZTROPINE MESYLATE 1 MG: 1 TABLET ORAL at 17:34

## 2023-03-30 RX ADMIN — QUETIAPINE FUMARATE 50 MG: 25 TABLET ORAL at 16:39

## 2023-03-30 RX ADMIN — PREGABALIN 50 MG: 50 CAPSULE ORAL at 09:21

## 2023-03-30 RX ADMIN — QUETIAPINE FUMARATE 50 MG: 25 TABLET ORAL at 09:21

## 2023-03-30 RX ADMIN — BUSPIRONE HYDROCHLORIDE 20 MG: 10 TABLET ORAL at 09:21

## 2023-03-30 RX ADMIN — BUSPIRONE HYDROCHLORIDE 20 MG: 10 TABLET ORAL at 16:39

## 2023-03-30 RX ADMIN — PAROXETINE 30 MG: 20 TABLET, FILM COATED ORAL at 09:21

## 2023-03-30 RX ADMIN — PAROXETINE 30 MG: 20 TABLET, FILM COATED ORAL at 17:34

## 2023-03-30 RX ADMIN — ONDANSETRON 4 MG: 2 INJECTION INTRAMUSCULAR; INTRAVENOUS at 16:43

## 2023-03-30 RX ADMIN — ASPIRIN 81 MG: 81 TABLET, COATED ORAL at 09:21

## 2023-03-30 RX ADMIN — PANTOPRAZOLE SODIUM 40 MG: 40 INJECTION, POWDER, FOR SOLUTION INTRAVENOUS at 09:24

## 2023-03-30 RX ADMIN — FERROUS SULFATE TAB 325 MG (65 MG ELEMENTAL FE) 325 MG: 325 (65 FE) TAB at 09:21

## 2023-03-30 RX ADMIN — FOLIC ACID 1000 MCG: 1 TABLET ORAL at 09:21

## 2023-03-30 RX ADMIN — BENZTROPINE MESYLATE 1 MG: 1 TABLET ORAL at 09:21

## 2023-03-30 RX ADMIN — LITHIUM CARBONATE 300 MG: 300 TABLET, FILM COATED, EXTENDED RELEASE ORAL at 22:12

## 2023-03-30 RX ADMIN — MIRTAZAPINE 7.5 MG: 15 TABLET, FILM COATED ORAL at 17:34

## 2023-03-30 RX ADMIN — PRAZOSIN HYDROCHLORIDE 2 MG: 2 CAPSULE ORAL at 22:12

## 2023-03-30 RX ADMIN — AMLODIPINE BESYLATE 10 MG: 10 TABLET ORAL at 09:21

## 2023-03-30 RX ADMIN — PREGABALIN 50 MG: 50 CAPSULE ORAL at 17:34

## 2023-03-30 NOTE — PROGRESS NOTES
"Progress Note - General Surgery   Samantha Garvey 59 y o  female MRN: [id number]  Unit/Bed#: E5 -01 Encounter: [id number]    Assessment:  56yoF p/w enteritis vs partial SBO/ileus    3/28 CT A/P w PO: Ileus versus partial SBO, partially decompressed distal SB loops without transition point, contrast present throughout small bowel, dilute contrast in right colon    Vital stable, afebrile  WBC 3 8 from 3 7   hemoglobin 10 3 from 11 3   creatinine 0 68 from 0 62    UOP 2 7 L    Plan:  -Advance to low residual diet  -Discontinue IV fluids  -Pain control  -Encourage ambulation  -Lovenox  -Care per primary team    Subjective/Objective   Subjective:   Patient reports that abdominal pain is improved but continues to complain of a burning sensation denies nausea/vomiting  Having bowel function including small formed stool  Tolerating diet without issue  Voiding freely  No acute events overnight    Objective:     Blood pressure 124/83, pulse 83, temperature 97 9 °F (36 6 °C), temperature source Oral, resp  rate 19, height 5' 1\" (1 549 m), weight 72 5 kg (159 lb 13 3 oz), SpO2 97 %, not currently breastfeeding  ,Body mass index is 30 2 kg/m²  Intake/Output Summary (Last 24 hours) at 3/30/2023 0816  Last data filed at 3/30/2023 0741  Gross per 24 hour   Intake 850 ml   Output 2900 ml   Net -2050 ml       Invasive Devices     Peripheral Intravenous Line  Duration           Peripheral IV 03/27/23 Dorsal (posterior); Left Hand 3 days                Physical Exam:   General: NAD  Skin: Warm, dry, anicteric  HEENT: Normocephalic, atraumatic  CV: RRR, no m/r/g  Pulm: CTA b/l, no inc WOB  Abd: Soft, nontender, mildly distended  MSK: Symmetric, no edema, no tenderness, no deformity  Neuro: AOx3, GCS 15    Lab, Imaging and other studies:  I have personally reviewed pertinent lab results    , CBC:   Lab Results   Component Value Date    WBC 3 80 (L) 03/30/2023    HGB 10 3 (L) 03/30/2023    HCT 31 8 (L) 03/30/2023    MCV 92 " 03/30/2023     03/30/2023    MCH 29 8 03/30/2023    MCHC 32 4 03/30/2023    RDW 14 6 03/30/2023    MPV 8 9 03/30/2023   , CMP:   Lab Results   Component Value Date    SODIUM 144 03/30/2023    K 3 4 (L) 03/30/2023     (H) 03/30/2023    CO2 23 03/30/2023    BUN 2 (L) 03/30/2023    CREATININE 0 68 03/30/2023    CALCIUM 7 3 (L) 03/30/2023    EGFR 96 03/30/2023     VTE Pharmacologic Prophylaxis: Sequential compression device (Venodyne)  and Enoxaparin (Lovenox)  VTE Mechanical Prophylaxis: sequential compression device

## 2023-03-30 NOTE — PLAN OF CARE
Problem: Potential for Falls  Goal: Patient will remain free of falls  Description: INTERVENTIONS:  - Educate patient/family on patient safety including physical limitations  - Instruct patient to call for assistance with activity   - Consult OT/PT to assist with strengthening/mobility   - Keep Call bell within reach  - Keep bed low and locked with side rails adjusted as appropriate  - Keep care items and personal belongings within reach  - Initiate and maintain comfort rounds  - Make Fall Risk Sign visible to staff  - Offer Toileting every 2 Hours, in advance of need  - Initiate/Maintain bed alarm  - Obtain necessary fall risk management equipment:   - Apply yellow socks and bracelet for high fall risk patients  - Consider moving patient to room near nurses station  Outcome: Progressing     Problem: PAIN - ADULT  Goal: Verbalizes/displays adequate comfort level or baseline comfort level  Description: Interventions:  - Encourage patient to monitor pain and request assistance  - Assess pain using appropriate pain scale  - Administer analgesics based on type and severity of pain and evaluate response  - Implement non-pharmacological measures as appropriate and evaluate response  - Consider cultural and social influences on pain and pain management  - Notify physician/advanced practitioner if interventions unsuccessful or patient reports new pain  Outcome: Progressing     Problem: INFECTION - ADULT  Goal: Absence or prevention of progression during hospitalization  Description: INTERVENTIONS:  - Assess and monitor for signs and symptoms of infection  - Monitor lab/diagnostic results  - Monitor all insertion sites, i e  indwelling lines, tubes, and drains  - Monitor endotracheal if appropriate and nasal secretions for changes in amount and color  - [place] appropriate cooling/warming therapies per order  - Administer medications as ordered  - Instruct and encourage patient and family to use good hand hygiene technique  - Identify and instruct in appropriate isolation precautions for identified infection/condition  Outcome: Progressing     Problem: SAFETY ADULT  Goal: Maintain or return to baseline ADL function  Description: INTERVENTIONS:  -  Assess patient's ability to carry out ADLs; assess patient's baseline for ADL function and identify physical deficits which impact ability to perform ADLs (bathing, care of mouth/teeth, toileting, grooming, dressing, etc )  - Assess/evaluate cause of self-care deficits   - Assess range of motion  - Assess patient's mobility; develop plan if impaired  - Assess patient's need for assistive devices and provide as appropriate  - Encourage maximum independence but intervene and supervise when necessary  - Involve family in performance of ADLs  - Assess for home care needs following discharge   - Consider OT consult to assist with ADL evaluation and planning for discharge  - Provide patient education as appropriate  Outcome: Progressing     Problem: SAFETY ADULT  Goal: Maintains/Returns to pre admission functional level  Description: INTERVENTIONS:  - Perform BMAT or MOVE assessment daily    - Set and communicate daily mobility goal to care team and patient/family/caregiver  - Collaborate with rehabilitation services on mobility goals if consulted  - Perform Range of Motion   - Reposition patient every 2 hours    - Dangle patient   - Stand patient   - Ambulate patient   - Out of bed to chair   - Out of bed for meals   - Out of bed for toileting  - Record patient progress and toleration of activity level   Outcome: Progressing     Problem: DISCHARGE PLANNING  Goal: Discharge to home or other facility with appropriate resources  Description: INTERVENTIONS:  - Identify barriers to discharge w/patient and caregiver  - Arrange for needed discharge resources and transportation as appropriate  - Identify discharge learning needs (meds, wound care, etc )  - Arrange for interpretive services to assist at discharge as needed  - Refer to Case Management Department for coordinating discharge planning if the patient needs post-hospital services based on physician/advanced practitioner order or complex needs related to functional status, cognitive ability, or social support system  Outcome: Progressing     Problem: Knowledge Deficit  Goal: Patient/family/caregiver demonstrates understanding of disease process, treatment plan, medications, and discharge instructions  Description: Complete learning assessment and assess knowledge base    Interventions:  - Provide teaching at level of understanding  - Provide teaching via preferred learning methods  Outcome: Progressing

## 2023-03-30 NOTE — CASE MANAGEMENT
Niki Ortega 48 received request for authorization from Care Manager  Authorization request for: SNF  Facility Name: [facility] NPI: [id number]  Facility MD:  Dr Tisha López  NPI: [id number]  Authorization initiated by contacting insurance: [facility] Via: Phone  Pending Reference #: [id number]  Clinicals submitted via:  Fax

## 2023-03-30 NOTE — ASSESSMENT & PLAN NOTE
Patient presented to the ED following multiple episodes of vomiting at home x one day  She reports two episodes of diarrhea at home, patient had multiple episodes of vomiting in the ED      · CT abdomen/pelvis showed mid to distal high-grade small bowel obstruction possibly due to adhesions  · Surgery consultation appreciated  · Surgery recommended NGT however attempts unsuccessful after which patient refused  · 3/27 started on clear liquid diet, had a bowel movement  · 3/28 morning patient complaining of no flatulence, no bowel movement, distension after eating  · 3/28 CT abdomen pelvis with p o  contrast showed ileus versus partial small bowel obstruction, partially decompressed distal small bowel loops without evidence of discrete transition point  · 3/29 Obstruction series showed contrast in right colon  · Advance diet to low residue diet  · Encourage ambulation  · Pain control  · Discontinue IV fluid

## 2023-03-30 NOTE — ASSESSMENT & PLAN NOTE
· All cell lines dropped, could be dilutional  · Hemoglobin has been stable in the range 10/11  · Continue to monitor

## 2023-03-30 NOTE — PROGRESS NOTES
[address] [place] [address]  Progress Note  Name: Itzel Anne  MRN: [id number]  Unit/Bed#: E5 -01 I Date of Admission: 3/23/2023   Date of Service: 3/30/2023 I Hospital Day: 6    Assessment/Plan   * Small bowel obstruction [facility])  Assessment & Plan  Patient presented to the ED following multiple episodes of vomiting at home x one day  She reports two episodes of diarrhea at home, patient had multiple episodes of vomiting in the ED      · CT abdomen/pelvis showed mid to distal high-grade small bowel obstruction possibly due to adhesions  · Surgery consultation appreciated  · Surgery recommended NGT however attempts unsuccessful after which patient refused  · 3/27 started on clear liquid diet, had a bowel movement  · 3/28 morning patient complaining of no flatulence, no bowel movement, distension after eating  · 3/28 CT abdomen pelvis with p o  contrast showed ileus versus partial small bowel obstruction, partially decompressed distal small bowel loops without evidence of discrete transition point  · 3/29 Obstruction series showed contrast in right colon  · Advance diet to low residue diet  · Encourage ambulation  · Pain control  · Discontinue IV fluid    Fall  Assessment & Plan  · Patient was watching herself at the scene, became dizzy, before falling down PCA who was next to her caught her and lowered down on her right side, denies hitting her head  · No obvious injury  · Vitals were stable after the event  · Right hip x-ray right knee x-ray pending    Memory loss  Assessment & Plan  · Patient not great historian   · Followed outpatient by neurology for this issue   · Scheduled to follow-up outpatient with neuropsych after ENT hearing evaluation completed    Anemia  Assessment & Plan  · All cell lines dropped, could be dilutional  · Hemoglobin has been stable in the range 10/11  · Continue to monitor    Hypokalemia  Assessment & Plan  · Continue to replete as needed    Mixed hyperlipidemia  Assessment & Plan  · Continue statin    Tardive dyskinesia  Assessment & Plan  · Patient with chronic tardive dyskinesia secondary to psychiatric medications  · Continue 1 mg cogentin b i d and 40 mg ingrezza daily (nonformulary, family did not provide it)    Essential hypertension  Assessment & Plan  · Continue amlodipine     Bipolar II disorder ([facility] [address] )  Assessment & Plan  · Patient mood is stable with tardive dyskinesia at baseline  · Continue home medication regimen  · 50 mg seroquel t i d  · 300 mg lithium h s  · 30 mg paxil b i d  · 60 mg paxil h s   · 20 mg buspar t i d  · 40 mg ingrezza daily--nonformulary, not provided by family  · 7 5 mg remeron h s   · 50 mg hydroxyzine b i d prn    Chronic pain disorder  Assessment & Plan  · Continue 50 mg pregabalin b i d             VTE Pharmacologic Prophylaxis: VTE Score: 2 Lovenox    Patient Centered Rounds: I performed bedside rounds with nursing staff today  Discussions with Specialists or Other Care Team Provider: Nursing, case management    Education and Discussions with Family / Patient: Updated  (daughter) via phone  Total Time Spent on Date of Encounter in care of patient: 35 minutes This time was spent on one or more of the following: performing physical exam; counseling and coordination of care; obtaining or reviewing history; documenting in the medical record; reviewing/ordering tests, medications or procedures; communicating with other healthcare professionals and discussing with patient's family/caregivers  Current Length of Stay: 6 day(s)  Current Patient Status: Inpatient   Certification Statement: The patient will continue to require additional inpatient hospital stay due to Small bowel obstruction  Discharge Plan: Anticipate discharge in 48-72 hrs to rehab facility  Code Status: Level 1 - Full Code    Subjective:   Patient was seen and evaluated bedside, intermittently lightheaded when she is walking    She is hungry    Objective:     Vitals:   Temp (24hrs), Av 2 °F (36 8 °C), Min:97 9 °F (36 6 °C), Max:98 5 °F (36 9 °C)    Temp:  [97 9 °F (36 6 °C)-98 5 °F (36 9 °C)] 97 9 °F (36 6 °C)  HR:  [63-83] 73  Resp:  [19] 19  BP: (110-124)/(70-83) 110/82  SpO2:  [94 %-97 %] 96 %  Body mass index is 30 2 kg/m²  Input and Output Summary (last 24 hours): Intake/Output Summary (Last 24 hours) at 3/30/2023 1815  Last data filed at 3/30/2023 1651  Gross per 24 hour   Intake 540 ml   Output 1600 ml   Net -1060 ml       Physical Exam:   Physical Exam  Constitutional:       General: She is not in acute distress  HENT:      Head: Atraumatic  Cardiovascular:      Rate and Rhythm: Normal rate and regular rhythm  Heart sounds: No murmur heard  No friction rub  No gallop  Pulmonary:      Effort: Pulmonary effort is normal  No respiratory distress  Breath sounds: Normal breath sounds  No wheezing  Abdominal:      General: Bowel sounds are normal  There is no distension  Palpations: Abdomen is soft  Tenderness: There is abdominal tenderness  Musculoskeletal:         General: No swelling or tenderness  Cervical back: Neck supple  Skin:     General: Skin is warm and dry  Neurological:      General: No focal deficit present  Mental Status: She is alert  Psychiatric:         Mood and Affect: Mood normal           Additional Data:     Labs:  Results from last 7 days   Lab Units 23  0443 23  0502 23  0540 23  0441   WBC Thousand/uL 3 80* 3 70*   < > 3 30*   HEMOGLOBIN g/dL 10 3* 11 3*   < > 11 4*   HEMATOCRIT % 31 8* 34 6*   < > 35 8   PLATELETS Thousands/uL 265 264   < > 264   BANDS PCT %  --   --   --  5   NEUTROS PCT %  --  45   < >  --    LYMPHS PCT %  --  38   < >  --    LYMPHO PCT % 32  --   --  27   MONOS PCT %  --  11   < >  --    MONO PCT % 1*  --   --  12   EOS PCT % 12* 5   < > 2    < > = values in this interval not displayed       Results from last 7 days Lab Units 03/30/23  0443 03/29/23  0502   SODIUM mmol/L 144 142   POTASSIUM mmol/L 3 4* 3 5   CHLORIDE mmol/L 115* 110*   CO2 mmol/L 23 25   BUN mg/dL 2* 2*   CREATININE mg/dL 0 68 0 62   ANION GAP mmol/L 6 7   CALCIUM mg/dL 7 3* 8 1*   ALBUMIN g/dL  --  3 4*   TOTAL BILIRUBIN mg/dL  --  0 98   ALK PHOS U/L  --  51   ALT U/L  --  7   AST U/L  --  9*   GLUCOSE RANDOM mg/dL 68 75     Results from last 7 days   Lab Units 03/23/23  2157   INR  0 93             Results from last 7 days   Lab Units 03/24/23  0055 03/23/23  2157   LACTIC ACID mmol/L 1 9 5 2*       Lines/Drains:  Invasive Devices     Peripheral Intravenous Line  Duration           Peripheral IV 03/30/23 Dorsal (posterior); Left Forearm <1 day                      Imaging: Reviewed radiology reports from this admission including: abdominal/pelvic CT and xray(s)    Recent Cultures (last 7 days):         Last 24 Hours Medication List:   Current Facility-Administered Medications   Medication Dose Route Frequency Provider Last Rate   • acetaminophen  650 mg Oral Q6H PRN Philly Fischer PA-C     • aluminum-magnesium hydroxide-simethicone  30 mL Oral Q6H PRN Philly Fischer PA-C     • amLODIPine  10 mg Oral Daily Philly Fischer PA-C     • aspirin  81 mg Oral Daily Philly Fischer PA-C     • atorvastatin  40 mg Oral After Adan Roberts PA-C     • benztropine  1 mg Oral BID Philly Fischer PA-C     • busPIRone  20 mg Oral TID Philly Fischer PA-C     • enoxaparin  40 mg Subcutaneous Q24H Yair Barros MD     • ferrous sulfate  325 mg Oral Daily With Breakfast Philly Fischer PA-C     • folic acid  7,905 mcg Oral Daily Philly Fischer PA-C     • HYDROmorphone  0 5 mg Intravenous Q4H PRN Glenna Templeton MD     • hydrOXYzine HCL  50 mg Oral TID PRN Philly Fischer PA-C     • lithium carbonate  300 mg Oral HS Philly Fischer PA-C     • LORazepam  0 5 mg Oral BID PRN Philly Fischer PA-C     • mirtazapine  7 5 mg Oral QPM Zonia Poole Arsenio Dennis PA-C     • ondansetron  4 mg Intravenous Q6H PRN Norma Moran PA-C     • oxyCODONE  5 mg Oral Q4H PRN Rolly De Jesus MD     • pantoprazole  40 mg Intravenous Q24H [address] Rolly De Jesus MD     • PARoxetine  30 mg Oral BID Norma Moran PA-C     • PARoxetine  60 mg Oral HS Norma Moran PA-C     • prazosin  2 mg Oral HS Norma Moran PA-C     • pregabalin  50 mg Oral BID Norma Moran PA-C     • QUEtiapine  50 mg Oral TID Norma Moran PA-C     • Valbenazine Tosylate  40 mg Oral Daily Norma Moran PA-C          Today, Patient Was Seen By: Candido Villalobos MD    **Please Note: This note may have been constructed using a voice recognition system  **

## 2023-03-30 NOTE — CASE MANAGEMENT
Case Management Discharge Planning Note    Patient name Matheus Clayton  Location [place] [address]  [phone] MRN [id number]  : 1965 Date 3/30/2023       Current Admission Date: 3/23/2023  Current Admission Diagnosis:Small bowel obstruction [facility])   Patient Active Problem List    Diagnosis Date Noted   • Fall 2023   • Small bowel obstruction ([facility] [address] ) 2023   • Memory loss 2023   • Hemiplegia, post-stroke ([facility] [address] ) 2022   • Anemia 2022   • Status post total knee replacement, left 2022   • Seizure-like activity ([facility] [address] ) 2022   • Hypokalemia 2022   • Constipation 03/15/2022   • S/P total knee arthroplasty, right 03/10/2022   • CVA (cerebral vascular accident) ([facility] [address] ) 2022   • Preoperative evaluation to rule out surgical contraindication 2022   • Leukopenia 2021   • Mixed hyperlipidemia 2021   • Medical clearance for psychiatric admission 2021   • History of CVA (cerebrovascular accident) 2021   • Encephalopathy 2021   • Nausea 2021   • Multiple closed fractures of metatarsal bone of right foot 2021   • Chronic back pain 2021   • Arthritis of right knee 10/06/2020   • Dysphagia 2020   • Ambulatory dysfunction 2020   • Status post insertion of spinal cord stimulator 2020   • Superficial bacterial infection of skin 2020   • Scar of back 2020   • Impaired skin integrity associated with surgical incision 2020   • Rash 2020   • Primary osteoarthritis of both knees 2020   • Patellofemoral disorder of both knees 2020   • Tardive dyskinesia 2020   • MIMI (obstructive sleep apnea)    • Complaint related to dreams 2020   • Family history of colorectal cancer 2019   • Encounter for long-term use of opiate analgesic 2019   • Post laminectomy syndrome 10/07/2019   • Lumbar disc disease with radiculopathy 2018   • Spinal stenosis of lumbar region with neurogenic claudication 02/02/2018   • Lumbar radiculopathy 12/08/2017   • Bilateral hip pain 06/19/2017   • Primary localized osteoarthritis of both knees 06/16/2017   • Chronic pain disorder 02/28/2017   • Opioid dependence ([facility] [address] ) 02/28/2017   • Sacroiliitis ([facility] [address] ) 02/28/2017   • Lumbar spondylosis 10/31/2016   • Osteoarthritis of both hips 10/31/2016   • Generalized anxiety disorder 10/26/2016   • Major depressive disorder, recurrent episode, moderate degree ([facility] [address] ) 09/08/2016   • Right knee pain 06/06/2016   • Recent unexplained weight loss 06/06/2016   • Fatigue 10/26/2015   • [facility] ) 06/18/2015   • Panic disorder without agoraphobia 06/18/2015   • Post traumatic stress disorder 06/18/2015   • Left hip pain 07/25/2014   • Intractable low back pain 06/16/2014   • Tremor 06/12/2014   • Migraine 05/27/2014   • Esophageal reflux 05/01/2014   • Cognitive disorder 04/18/2014   • Female pelvic pain 10/30/2013   • Pain in joint, shoulder region 10/28/2013   • Overactive bladder 09/26/2013   • Osteoarthritis of knee 02/20/2013   • Insomnia 01/31/2013   • History of hypokalemia 01/03/2013   • Urinary incontinence 09/24/2012   • Vitamin D deficiency 09/18/2012   • Fibromyalgia 09/14/2012   • Essential hypertension 09/14/2012      LOS (days): 6  Geometric Mean LOS (GMLOS) (days): 3 00  Days to GMLOS:-3 6     OBJECTIVE:  Risk of Unplanned Readmission Score: 32 33         Current admission status: Inpatient   Preferred Pharmacy:   [address] [place] [address]  Phone: [phone] Fax: [phone]    [id number] [id number]  [address] [place] [address]  Phone: [phone] Fax: [address]  [address] [place] [address]  Phone: [phone] Fax: [phone] [address] [facility],[place] [address] [address] [facility]  Phone: [phone] Fax: [address], [address]  [address] [place] [address]  Phone: [phone] Fax: [phone]    Primary Care Provider: Dipak Hartman DO    Primary Insurance: Syed Godfrey MEDICARE [facility] REP  Secondary Insurance: Fleanselmo Gavin    DISCHARGE DETAILS:    Discharge planning discussed with[de-identified] patient's daughter Lopez Burks     Comments - [place] of Choice: Daughter Lopez Burks chose [address] for rehab over [facility]  CM contacted family/caregiver?: Yes  Were Treatment Team discharge recommendations reviewed with patient/caregiver?: Yes  Did patient/caregiver verbalize understanding of patient care needs?: N/A- going to facility  Were patient/caregiver advised of the risks associated with not following Treatment Team discharge recommendations?: Yes    Contacts  Patient Contacts: Lopez Rodriguez(Dtr) [phone] (M)  Relationship to Patient[de-identified] Family  Contact Method: Phone  Phone Number: [phone]  Reason/Outcome: Discharge Planning, Continuity of Care, Emergency [address]         Is the patient interested in [address] at discharge?: No    DME Referral Provided  Referral made for DME?: No    Other Referral/Resources/Interventions Provided:  Interventions: Short Term Rehab  Referral Comments: Accepted to [address]    Treatment Team Recommendation: Short Term Rehab  Discharge Destination Plan[de-identified] Short Term Rehab  Transport at Discharge : Wheelchair Nikki garett     Additional Comments: Patient was accepted to [address] for short term rehab  CM sent a text to therapy for updated notes for auth  CM sent  a message to CM discharge support to initiate auth

## 2023-03-30 NOTE — PLAN OF CARE
Problem: PHYSICAL THERAPY ADULT  Goal: Performs mobility at highest level of function for planned discharge setting  See evaluation for individualized goals  Description: Treatment/Interventions: Functional transfer training, LE strengthening/ROM, Elevations, Therapeutic exercise, Endurance training, Patient/family training, Bed mobility, Gait training, Continued evaluation, Spoke to nursing, OT          See flowsheet documentation for full assessment, interventions and recommendations  Outcome: Progressing  Note: Prognosis: Fair  Problem List: Decreased strength, Decreased endurance, Impaired balance, Decreased mobility, Pain, Decreased safety awareness, Impaired judgement  Assessment: Pt  Seen for PT treatment session as per PT POC  Pt  Reports  R le pain radiating to R ankle 9/10  Pt  Agreeable to PT  Pt is currently functioning at supervision assist for supine to sit with use of bedrails and head of bed elevated and min assist x1 for transfers and ambulation on levels with use of Rw  Pt  Displayed LOB x1 requiring mod assist to correct   Pt  demosntrates decreased safety during mobility training with the release of  from Rw while ambulating demonstrating (+) lob balance noted  Cues for improved posture, and gait quality to improve step lengths, fluency and base of support, balance and safety  PTambulated distances of 15'x2 and 27' x2 with use and support of rw  Noted improved focus to task this session  Pt  Performs seated b/l le arom and isometric exercises x 15 reps with increased time and cues for correct technique and performance  Pt  Remains functioning below baseline level of mobility and will benefit from continued inpt PT and rehab in order to address impairments and optimize functional outcomes, mobility and independence  PT Discharge Recommendation: Post acute rehabilitation services    See flowsheet documentation for full assessment

## 2023-03-30 NOTE — ASSESSMENT & PLAN NOTE
· Patient was watching herself at the scene, became dizzy, before falling down PCA who was next to her caught her and lowered down on her right side, denies hitting her head  · No obvious injury  · Vitals were stable after the event  · Right hip x-ray right knee x-ray pending

## 2023-03-30 NOTE — PHYSICAL THERAPY NOTE
PHYSICAL THERAPY NOTE          Patient Name: Radha Alvarez  GGKPT'B Date: 3/30/2023    03/30/23 1544   Note Type   Note Type Treatment   Pain Assessment   Pain Assessment Tool 0-10   Pain Score 9   Pain Location/Orientation Orientation: Right;Location: Hip;Location: Leg   Pain Radiating Towards from hip down to ankle  Hospital Pain Intervention(s) Repositioned; Ambulation/increased activity; Emotional support; Rest   Restrictions/Precautions   Other Precautions Fall Risk;Pain  (masimo)   General   Chart Reviewed Yes   Family/Caregiver Present No   Cognition   Overall Cognitive Status Impaired   Arousal/Participation Responsive; Cooperative   Attention Within functional limits   Orientation Level Oriented X4   Memory Decreased recall of precautions   Following Commands Follows one step commands without difficulty   Subjective   Subjective I fell in here yesterday  Bed Mobility   Supine to Sit 5  Supervision   Additional items Assist x 1;HOB elevated; Bedrails; Increased time required   Sit to Supine 3  Moderate assistance   Additional items Assist x 1; Increased time required;Verbal cues;LE management; Bedrails   Transfers   Sit to Stand 4  Minimal assistance   Additional items Assist x 1; Increased time required;Verbal cues; Bedrails   Stand to Sit 4  Minimal assistance   Additional items Assist x 1; Increased time required;Verbal cues   Toilet transfer 4  Minimal assistance   Additional items Assist x 1;Standard toilet  (grab bar)   Additional Comments cues for safe technique   Ambulation/Elevation   Gait pattern Improper Weight shift; Excessively slow; Short stride;Decreased foot clearance; Step to;Narrow CALLY; Ataxia  (progressing to step through gait )   Gait Assistance 4  Minimal assist   Additional items Assist x 1;Verbal cues   Assistive Device Rolling walker   Distance 15' x2, 30' x2   Ambulation/Elevation Additional Comments cues for improved posture and safe mobility, cues to maintain  on walker at at all times  Balance   Static Sitting Good   Dynamic Sitting Fair   Static Standing Fair -   Dynamic Standing Poor +   Ambulatory Poor +   Activity Tolerance   Activity Tolerance Patient limited by fatigue   Exercises   Hip Abduction Sitting;15 reps;AROM; Bilateral   Hip Adduction Sitting;15 reps;AROM; Bilateral  (and isometric hip add  x15 reps )   Knee AROM Long Arc Quad Sitting;15 reps;AROM; Bilateral   Ankle Pumps Sitting;15 reps;AROM   Marching Sitting;15 reps;AROM; Bilateral   Equipment Use   Comments pt  performed toileting with min assist for stand to sit and sit to stand with use of grab r and sona care I'ly while seated on toilet  Assessment   Prognosis Fair   Problem List Decreased strength;Decreased endurance; Impaired balance;Decreased mobility;Pain;Decreased safety awareness; Impaired judgement   Assessment Pt  Seen for PT treatment session as per PT POC  Pt  Reports  R le pain radiating to R ankle 9/10  Pt  Agreeable to PT  Pt is currently functioning at supervision assist for supine to sit with use of bedrails and head of bed elevated and min assist x1 for transfers and ambulation on levels with use of Rw  Pt  Displayed LOB x1 requiring mod assist to correct   Pt  demosntrates decreased safety during mobility training with the release of  from Rw while ambulating demonstrating (+) lob balance noted  Cues for improved posture, and gait quality to improve step lengths, fluency and base of support, balance and safety  PTambulated distances of 15'x2 and 27' x2 with use and support of rw  Noted improved focus to task this session  Pt  Performs seated b/l le arom and isometric exercises x 15 reps with increased time and cues for correct technique and performance    Pt  Remains functioning below baseline level of mobility and will benefit from continued inpt PT and rehab in order to address impairments and optimize functional outcomes, mobility and independence  Goals   Patient Goals To get better  [facility] Expiration Date 04/07/23   PT Treatment Day 2   Plan   Treatment/Interventions Functional transfer training;LE strengthening/ROM; Therapeutic exercise; Endurance training;Patient/family training;Equipment eval/education; Bed mobility;Gait training;Spoke to nursing   Progress Slow progress, decreased activity tolerance   PT Frequency 3-5x/wk   Recommendation   PT Discharge Recommendation Post acute rehabilitation services   AM-PAC Basic Mobility Inpatient   Turning in Flat Bed Without Bedrails 4   Lying on Back to Sitting on Edge of Flat Bed Without Bedrails 3   Moving Bed to Chair 3   Standing Up From Chair Using Arms 3   Walk in Room 3   Climb 3-5 Stairs With Railing 2   Basic Mobility Inpatient Raw Score 18   Basic Mobility Standardized Score 41 05   Highest Level Of Mobility   JH-HLM Goal 6: Walk 10 steps or more   JH-HLM Achieved 7: Walk 25 feet or more   Education   Education Provided Mobility training;Home exercise program;Assistive device   Patient Demonstrates acceptance/verbal understanding   End of Consult   Patient Position at End of Consult Supine;Bed/Chair alarm activated; All needs within reach   [place], [place]

## 2023-03-30 NOTE — ASSESSMENT & PLAN NOTE
· Patient mood is stable with tardive dyskinesia at baseline  · Continue home medication regimen  · 50 mg seroquel t i d  · 300 mg lithium h s    · 30 mg paxil b i d  · 60 mg paxil h s   · 20 mg buspar t i d  · 40 mg ingrezza daily--nonformulary, not provided by family  · 7 5 mg remeron h s   · 50 mg hydroxyzine b i d prn

## 2023-03-30 NOTE — ASSESSMENT & PLAN NOTE
· Patient with chronic tardive dyskinesia secondary to psychiatric medications  · Continue 1 mg cogentin b i d and 40 mg ingrezza daily (nonformulary, family did not provide it)

## 2023-03-31 LAB
ANION GAP SERPL CALCULATED.3IONS-SCNC: 8 MMOL/L (ref 4–13)
BASOPHILS # BLD MANUAL: 0 THOUSAND/UL (ref 0–0.1)
BASOPHILS NFR MAR MANUAL: 0 % (ref 0–1)
BUN SERPL-MCNC: 6 MG/DL (ref 5–25)
CALCIUM SERPL-MCNC: 8 MG/DL (ref 8.4–10.2)
CHLORIDE SERPL-SCNC: 106 MMOL/L (ref 96–108)
CO2 SERPL-SCNC: 27 MMOL/L (ref 21–32)
CREAT SERPL-MCNC: 0.82 MG/DL (ref 0.6–1.3)
EOSINOPHIL # BLD MANUAL: 0.27 THOUSAND/UL (ref 0–0.4)
EOSINOPHIL NFR BLD MANUAL: 7 % (ref 0–6)
FLUAV RNA RESP QL NAA+PROBE: NEGATIVE
FLUBV RNA RESP QL NAA+PROBE: NEGATIVE
GFR SERPL CREATININE-BSD FRML MDRD: 78 ML/MIN/1.73SQ M
GLUCOSE SERPL-MCNC: 85 MG/DL (ref 65–140)
LYMPHOCYTES # BLD AUTO: 1.6 THOUSAND/UL (ref 0.6–4.47)
LYMPHOCYTES # BLD AUTO: 42 % (ref 14–44)
MONOCYTES # BLD AUTO: 0.38 THOUSAND/UL (ref 0–1.22)
MONOCYTES NFR BLD: 10 % (ref 4–12)
NEUTROPHILS # BLD MANUAL: 1.56 THOUSAND/UL (ref 1.85–7.62)
NEUTS BAND NFR BLD MANUAL: 1 % (ref 0–8)
NEUTS SEG NFR BLD AUTO: 40 % (ref 43–75)
OVALOCYTES BLD QL SMEAR: PRESENT
PATHOLOGY REVIEW: NO
PLATELET BLD QL SMEAR: ADEQUATE
POIKILOCYTOSIS BLD QL SMEAR: PRESENT
POTASSIUM SERPL-SCNC: 3.4 MMOL/L (ref 3.5–5.3)
RBC MORPH BLD: PRESENT
RSV RNA RESP QL NAA+PROBE: NEGATIVE
SARS-COV-2 RNA RESP QL NAA+PROBE: NEGATIVE
SODIUM SERPL-SCNC: 141 MMOL/L (ref 135–147)
TOTAL CELLS COUNTED SPEC: 100

## 2023-03-31 RX ORDER — PANTOPRAZOLE SODIUM 40 MG/1
40 TABLET, DELAYED RELEASE ORAL
Status: DISCONTINUED | OUTPATIENT
Start: 2023-04-01 | End: 2023-04-06 | Stop reason: HOSPADM

## 2023-03-31 RX ORDER — POTASSIUM CHLORIDE 14.9 MG/ML
20 INJECTION INTRAVENOUS ONCE
Status: COMPLETED | OUTPATIENT
Start: 2023-03-31 | End: 2023-03-31

## 2023-03-31 RX ADMIN — BENZTROPINE MESYLATE 1 MG: 1 TABLET ORAL at 17:16

## 2023-03-31 RX ADMIN — BUSPIRONE HYDROCHLORIDE 20 MG: 10 TABLET ORAL at 17:16

## 2023-03-31 RX ADMIN — MIRTAZAPINE 7.5 MG: 15 TABLET, FILM COATED ORAL at 17:16

## 2023-03-31 RX ADMIN — PANTOPRAZOLE SODIUM 40 MG: 40 INJECTION, POWDER, FOR SOLUTION INTRAVENOUS at 09:01

## 2023-03-31 RX ADMIN — ATORVASTATIN CALCIUM 40 MG: 40 TABLET, FILM COATED ORAL at 17:16

## 2023-03-31 RX ADMIN — QUETIAPINE FUMARATE 50 MG: 25 TABLET ORAL at 09:11

## 2023-03-31 RX ADMIN — OXYCODONE 5 MG: 5 TABLET ORAL at 17:20

## 2023-03-31 RX ADMIN — BENZTROPINE MESYLATE 1 MG: 1 TABLET ORAL at 09:11

## 2023-03-31 RX ADMIN — OXYCODONE 5 MG: 5 TABLET ORAL at 09:11

## 2023-03-31 RX ADMIN — FOLIC ACID 1000 MCG: 1 TABLET ORAL at 09:11

## 2023-03-31 RX ADMIN — QUETIAPINE FUMARATE 50 MG: 25 TABLET ORAL at 17:16

## 2023-03-31 RX ADMIN — PREGABALIN 50 MG: 50 CAPSULE ORAL at 17:16

## 2023-03-31 RX ADMIN — PREGABALIN 50 MG: 50 CAPSULE ORAL at 09:11

## 2023-03-31 RX ADMIN — POTASSIUM CHLORIDE 20 MEQ: 14.9 INJECTION, SOLUTION INTRAVENOUS at 11:07

## 2023-03-31 RX ADMIN — ASPIRIN 81 MG: 81 TABLET, COATED ORAL at 09:11

## 2023-03-31 RX ADMIN — FERROUS SULFATE TAB 325 MG (65 MG ELEMENTAL FE) 325 MG: 325 (65 FE) TAB at 09:11

## 2023-03-31 RX ADMIN — ONDANSETRON 4 MG: 2 INJECTION INTRAMUSCULAR; INTRAVENOUS at 08:58

## 2023-03-31 RX ADMIN — AMLODIPINE BESYLATE 10 MG: 10 TABLET ORAL at 09:11

## 2023-03-31 RX ADMIN — BUSPIRONE HYDROCHLORIDE 20 MG: 10 TABLET ORAL at 09:11

## 2023-03-31 RX ADMIN — QUETIAPINE FUMARATE 50 MG: 25 TABLET ORAL at 21:47

## 2023-03-31 RX ADMIN — BUSPIRONE HYDROCHLORIDE 20 MG: 10 TABLET ORAL at 21:47

## 2023-03-31 RX ADMIN — PRAZOSIN HYDROCHLORIDE 2 MG: 2 CAPSULE ORAL at 21:47

## 2023-03-31 RX ADMIN — LITHIUM CARBONATE 300 MG: 300 TABLET, FILM COATED, EXTENDED RELEASE ORAL at 21:47

## 2023-03-31 RX ADMIN — PAROXETINE 30 MG: 20 TABLET, FILM COATED ORAL at 09:11

## 2023-03-31 RX ADMIN — ALUMINUM HYDROXIDE, MAGNESIUM HYDROXIDE, AND DIMETHICONE 30 ML: 200; 20; 200 SUSPENSION ORAL at 21:47

## 2023-03-31 RX ADMIN — PAROXETINE 30 MG: 20 TABLET, FILM COATED ORAL at 17:16

## 2023-03-31 RX ADMIN — ENOXAPARIN SODIUM 40 MG: 100 INJECTION SUBCUTANEOUS at 09:07

## 2023-03-31 RX ADMIN — POTASSIUM CHLORIDE 20 MEQ: 14.9 INJECTION, SOLUTION INTRAVENOUS at 09:04

## 2023-03-31 RX ADMIN — PAROXETINE 60 MG: 20 TABLET, FILM COATED ORAL at 21:47

## 2023-03-31 NOTE — CASE MANAGEMENT
Case Management Discharge Planning Note    Patient name Elida Jessica [place] [id number]  [phone] MRN [id number]  : 1965 Date 3/31/2023       Current Admission Date: 3/23/2023  Current Admission Diagnosis:Small bowel obstruction [facility])   Patient Active Problem List    Diagnosis Date Noted   • Fall 2023   • Small bowel obstruction ([address] ) 2023   • Memory loss 2023   • Hemiplegia, post-stroke ([facility] [address] ) 2022   • Anemia 2022   • Status post total knee replacement, left 2022   • Seizure-like activity ([facility] [address] ) 2022   • Hypokalemia 2022   • Constipation 03/15/2022   • S/P total knee arthroplasty, right 03/10/2022   • CVA (cerebral vascular accident) ([facility] [address] ) 2022   • Preoperative evaluation to rule out surgical contraindication 2022   • Leukopenia 2021   • Mixed hyperlipidemia 2021   • Medical clearance for psychiatric admission 2021   • History of CVA (cerebrovascular accident) 2021   • Encephalopathy 2021   • Nausea 2021   • Multiple closed fractures of metatarsal bone of right foot 2021   • Chronic back pain 2021   • Arthritis of right knee 10/06/2020   • Dysphagia 2020   • Ambulatory dysfunction 2020   • Status post insertion of spinal cord stimulator 2020   • Superficial bacterial infection of skin 2020   • Scar of back 2020   • Impaired skin integrity associated with surgical incision 2020   • Rash 2020   • Primary osteoarthritis of both knees 2020   • Patellofemoral disorder of both knees 2020   • Tardive dyskinesia 2020   • MIMI (obstructive sleep apnea)    • Complaint related to dreams 2020   • Family history of colorectal cancer 2019   • Encounter for long-term use of opiate analgesic 2019   • Post laminectomy syndrome 10/07/2019   • Lumbar disc disease with radiculopathy 2018   • Spinal stenosis of lumbar region with neurogenic claudication 02/02/2018   • Lumbar radiculopathy 12/08/2017   • Bilateral hip pain 06/19/2017   • Primary localized osteoarthritis of both knees 06/16/2017   • Chronic pain disorder 02/28/2017   • Opioid dependence ([facility] [address] ) 02/28/2017   • Sacroiliitis ([facility] [address] ) 02/28/2017   • Lumbar spondylosis 10/31/2016   • Osteoarthritis of both hips 10/31/2016   • Generalized anxiety disorder 10/26/2016   • Major depressive disorder, recurrent episode, moderate degree ([facility] [address] ) 09/08/2016   • Right knee pain 06/06/2016   • Recent unexplained weight loss 06/06/2016   • Fatigue 10/26/2015   • Bipolar II disorder ([facility] ) 06/18/2015   • Panic disorder without agoraphobia 06/18/2015   • Post traumatic stress disorder 06/18/2015   • Left hip pain 07/25/2014   • Intractable low back pain 06/16/2014   • Tremor 06/12/2014   • Migraine 05/27/2014   • Esophageal reflux 05/01/2014   • Cognitive disorder 04/18/2014   • Female pelvic pain 10/30/2013   • Pain in joint, shoulder region 10/28/2013   • Overactive bladder 09/26/2013   • Osteoarthritis of knee 02/20/2013   • Insomnia 01/31/2013   • History of hypokalemia 01/03/2013   • Urinary incontinence 09/24/2012   • Vitamin D deficiency 09/18/2012   • Fibromyalgia 09/14/2012   • Essential hypertension 09/14/2012      LOS (days): 7  Geometric Mean LOS (GMLOS) (days): 3 00  Days to GMLOS:-4 6     OBJECTIVE:  Risk of Unplanned Readmission Score: 32 5         Current admission status: Inpatient   Preferred Pharmacy:   [address] [place] [address]  Phone: [phone] Fax: [phone]    [id number] [address] [address]  Phone: [phone] Fax: [place] [address]  Phone: [phone] Fax: [phone]    [facility], [place] - [address] [facility] [address]  Phone: [phone] Fax: [address], [address] [facility]  [address] [facility] [address]  Phone: [phone] Fax: [phone]    Primary Care Provider: Melisa Goetz DO    Primary Insurance: [address] [facility] [facility]  Secondary Insurance: [address]    DISCHARGE DETAILS:    Discharge planning discussed with[de-identified] patient's daughter Mick [place] of Choice: Yes  Comments - Freedom of Choice: Alessandroripiedad chose [facility]  CM contacted family/caregiver?: Yes  Were Treatment Team discharge recommendations reviewed with patient/caregiver?: Yes  Did patient/caregiver verbalize understanding of patient care needs?: N/A- going to facility  Were patient/caregiver advised of the risks associated with not following Treatment Team discharge recommendations?: Yes    Contacts  Patient Contacts: Miguel Rodriguez(Dtr) [phone] (M)  Relationship to Patient[de-identified] Family  Contact Method: Phone  Phone Number: [phone]  Reason/Outcome: Discharge Planning, Continuity of Care, Emergency [address]         Is the patient interested in [facility] at discharge?: No    DME Referral Provided  Referral made for DME?: No    Other Referral/Resources/Interventions Provided:  Interventions: Short Term Rehab  Referral Comments: Accepted to [facility]    Treatment Team Recommendation: Short Term Rehab  Discharge Destination Plan[de-identified] Short Term Rehab  Transport at Discharge : Wheelchair van  Dispatcher Contacted: Yes  Number/Name of Dispatcher: marshall [phone]     ETA of Transport (Date): 04/01/23  ETA of Transport (Time): 1330     Transfer Mode: Wheelchair     IMM Given (Date):: 03/31/23  IMM Given to[de-identified] Patient    Accepting Facility Name, [facility] : [address] [facility]  Receiving Facility/Agency Phone Number: [phone], fax [phone]    Facility/Agency Fax Number: [phone], fax [phone]

## 2023-03-31 NOTE — PLAN OF CARE
Problem: OCCUPATIONAL THERAPY ADULT  Goal: Performs self-care activities at highest level of function for planned discharge setting  See evaluation for individualized goals  Description: Treatment Interventions: ADL retraining, Functional transfer training, UE strengthening/ROM, Endurance training, Cognitive reorientation, Patient/family training, Compensatory technique education, Continued evaluation          See flowsheet documentation for full assessment, interventions and recommendations  Outcome: Progressing  Note: Limitation: Decreased ADL status, Decreased UE strength, Decreased Safe judgement during ADL, Decreased cognition, Decreased endurance, Decreased high-level ADLs  Prognosis: Fair  Assessment: Patient participated in skilled OT session this date with interventions consisting of therapeutic activities and self-care/ADL training to increase B UE strength, functional endurance/activity tolerance, static/dynamic sitting/standing balance, and overall safety awareness to increase (I) with ADLs/IADLs to return to PLOF  Provided education on energy conservation techniques, deep breathing techniques, fall prevention strategies, and overall safety awareness  Patient agreeable to OT treatment session, upon arrival patient was found supine in bed  Patient requiring verbal cues for safety and verbal cues for pacing through activity steps  Please refer to flowsheet for functional performance  Patient continues to be functioning below baseline level, occupational performance remains limited secondary to factors listed above and increased risk for falls and injury  Pt left supine in bed, alarm armed and call bell and bedside table within reach; all needs met       OT Discharge Recommendation: Post acute rehabilitation services

## 2023-03-31 NOTE — PLAN OF CARE
Problem: Potential for Falls  Goal: Patient will remain free of falls  Description: INTERVENTIONS:  - Educate patient/family on patient safety including physical limitations  - Instruct patient to call for assistance with activity   - Consult OT/PT to assist with strengthening/mobility   - Keep Call bell within reach  - Keep bed low and locked with side rails adjusted as appropriate  - Keep care items and personal belongings within reach  - Initiate and maintain comfort rounds  - Make Fall Risk Sign visible to staff  - Offer Toileting every 2 Hours, in advance of need  - Initiate/Maintain bed alarm  - Obtain necessary fall risk management equipment  - Apply yellow socks and bracelet for high fall risk patients  - Consider moving patient to room near nurses station  Outcome: Progressing     Problem: PAIN - ADULT  Goal: Verbalizes/displays adequate comfort level or baseline comfort level  Description: Interventions:  - Encourage patient to monitor pain and request assistance  - Assess pain using appropriate pain scale  - Administer analgesics based on type and severity of pain and evaluate response  - Implement non-pharmacological measures as appropriate and evaluate response  - Consider cultural and social influences on pain and pain management  - Notify physician/advanced practitioner if interventions unsuccessful or patient reports new pain  Outcome: Progressing     Problem: INFECTION - ADULT  Goal: Absence or prevention of progression during hospitalization  Description: INTERVENTIONS:  - Assess and monitor for signs and symptoms of infection  - Monitor lab/diagnostic results  - Monitor all insertion sites, i e  indwelling lines, tubes, and drains  - Monitor endotracheal if appropriate and nasal secretions for changes in amount and color  - [place] appropriate cooling/warming therapies per order  - Administer medications as ordered  - Instruct and encourage patient and family to use good hand hygiene technique  - Identify and instruct in appropriate isolation precautions for identified infection/condition  Outcome: Progressing     Problem: SAFETY ADULT  Goal: Patient will remain free of falls  Description: INTERVENTIONS:  - Educate patient/family on patient safety including physical limitations  - Instruct patient to call for assistance with activity   - Consult OT/PT to assist with strengthening/mobility   - Keep Call bell within reach  - Keep bed low and locked with side rails adjusted as appropriate  - Keep care items and personal belongings within reach  - Initiate and maintain comfort rounds  - Make Fall Risk Sign visible to staff  - Offer Toileting every 2 Hours, in advance of need  - Initiate/Maintain bed alarm  - Obtain necessary fall risk management equipment  - Apply yellow socks and bracelet for high fall risk patients  - Consider moving patient to room near nurses station  Outcome: Progressing  Goal: Maintain or return to baseline ADL function  Description: INTERVENTIONS:  -  Assess patient's ability to carry out ADLs; assess patient's baseline for ADL function and identify physical deficits which impact ability to perform ADLs (bathing, care of mouth/teeth, toileting, grooming, dressing, etc )  - Assess/evaluate cause of self-care deficits   - Assess range of motion  - Assess patient's mobility; develop plan if impaired  - Assess patient's need for assistive devices and provide as appropriate  - Encourage maximum independence but intervene and supervise when necessary  - Involve family in performance of ADLs  - Assess for home care needs following discharge   - Consider OT consult to assist with ADL evaluation and planning for discharge  - Provide patient education as appropriate  Outcome: Progressing  Goal: Maintains/Returns to pre admission functional level  Description: INTERVENTIONS:  - Perform BMAT or MOVE assessment daily    - Set and communicate daily mobility goal to care team and patient/family/caregiver  - Collaborate with rehabilitation services on mobility goals if consulted  - Perform Range of Motion 3 times a day  - Reposition patient every 2 hours  - Dangle patient 3 times a day  - Stand patient 3 times a day  - Ambulate patient 3 times a day  - Out of bed to chair 3 times a day   - Out of bed for meals 3 times a day  - Out of bed for toileting  - Record patient progress and toleration of activity level   Outcome: Progressing     Problem: DISCHARGE PLANNING  Goal: Discharge to home or other facility with appropriate resources  Description: INTERVENTIONS:  - Identify barriers to discharge w/patient and caregiver  - Arrange for needed discharge resources and transportation as appropriate  - Identify discharge learning needs (meds, wound care, etc )  - Arrange for interpretive services to assist at discharge as needed  - Refer to Case Management Department for coordinating discharge planning if the patient needs post-hospital services based on physician/advanced practitioner order or complex needs related to functional status, cognitive ability, or social support system  Outcome: Progressing     Problem: Knowledge Deficit  Goal: Patient/family/caregiver demonstrates understanding of disease process, treatment plan, medications, and discharge instructions  Description: Complete learning assessment and assess knowledge base    Interventions:  - Provide teaching at level of understanding  - Provide teaching via preferred learning methods  Outcome: Progressing     Problem: MOBILITY - ADULT  Goal: Maintain or return to baseline ADL function  Description: INTERVENTIONS:  -  Assess patient's ability to carry out ADLs; assess patient's baseline for ADL function and identify physical deficits which impact ability to perform ADLs (bathing, care of mouth/teeth, toileting, grooming, dressing, etc )  - Assess/evaluate cause of self-care deficits   - Assess range of motion  - Assess patient's mobility; develop plan if impaired  - Assess patient's need for assistive devices and provide as appropriate  - Encourage maximum independence but intervene and supervise when necessary  - Involve family in performance of ADLs  - Assess for home care needs following discharge   - Consider OT consult to assist with ADL evaluation and planning for discharge  - Provide patient education as appropriate  Outcome: Progressing  Goal: Maintains/Returns to pre admission functional level  Description: INTERVENTIONS:  - Perform BMAT or MOVE assessment daily    - Set and communicate daily mobility goal to care team and patient/family/caregiver  - Collaborate with rehabilitation services on mobility goals if consulted  - Perform Range of Motion 3 times a day  - Reposition patient every 2 hours    - Dangle patient 3 times a day  - Stand patient 3 times a day  - Ambulate patient 3 times a day  - Out of bed to chair 3 times a day   - Out of bed for meals 3 times a day  - Out of bed for toileting  - Record patient progress and toleration of activity level   Outcome: Progressing

## 2023-03-31 NOTE — ASSESSMENT & PLAN NOTE
· Patient was watching herself at the scene, became dizzy, before falling down PCA who was next to her caught her and lowered down on her right side, denies hitting her head  · No obvious injury  · Vitals were stable after the event  · Right hip x-ray right knee x-ray without osseous abnormality

## 2023-03-31 NOTE — PROGRESS NOTES
[address] [place] [address]  Progress Note  Name: Beatriz Izquierdo  MRN: [id number]  Unit/Bed#: E5 -01 I Date of Admission: 3/23/2023   Date of Service: 3/31/2023 I Hospital Day: 7    Assessment/Plan   * Small bowel obstruction [facility])  Assessment & Plan  Patient presented to the ED following multiple episodes of vomiting at home x one day  She reports two episodes of diarrhea at home, patient had multiple episodes of vomiting in the ED      · CT abdomen/pelvis showed mid to distal high-grade small bowel obstruction possibly due to adhesions  · Surgery consultation appreciated  · Surgery recommended NGT however attempts unsuccessful after which patient refused  · 3/27 started on clear liquid diet, had a bowel movement  · 3/28 morning patient complaining of no flatulence, no bowel movement, distension after eating  · 3/28 CT abdomen pelvis with p o  contrast showed ileus versus partial small bowel obstruction, partially decompressed distal small bowel loops without evidence of discrete transition point  · 3/29 Obstruction series showed contrast in right colon  · Advance diet to low residue diet, tolerating well  · Had BM  · Encourage ambulation  · Pain control    Fall  Assessment & Plan  · Patient was watching herself at the scene, became dizzy, before falling down PCA who was next to her caught her and lowered down on her right side, denies hitting her head  · No obvious injury  · Vitals were stable after the event  · Right hip x-ray right knee x-ray without osseous abnormality    Memory loss  Assessment & Plan  · Patient not great historian   · Followed outpatient by neurology for this issue   · Scheduled to follow-up outpatient with neuropsych after ENT hearing evaluation completed    Anemia  Assessment & Plan  · All cell lines dropped, could be dilutional  · Hemoglobin has been stable in the range 10/11  · Continue to monitor    Hypokalemia  Assessment & Plan  · Continue to replete as needed    Mixed hyperlipidemia  Assessment & Plan  · Continue statin    Tardive dyskinesia  Assessment & Plan  · Patient with chronic tardive dyskinesia secondary to psychiatric medications  · Continue 1 mg cogentin b i d and 40 mg ingrezza daily (nonformulary, family did not provide it)    Essential hypertension  Assessment & Plan  · Continue amlodipine     Bipolar II disorder ([facility] [address] )  Assessment & Plan  · Patient mood is stable with tardive dyskinesia at baseline  · Continue home medication regimen  · 50 mg seroquel t i d  · 300 mg lithium h s  · 30 mg paxil b i d  · 60 mg paxil h s   · 20 mg buspar t i d  · 40 mg ingrezza daily--nonformulary, not provided by family  · 7 5 mg remeron h s   · 50 mg hydroxyzine b i d prn    Chronic pain disorder  Assessment & Plan  · Continue 50 mg pregabalin b i d             VTE Pharmacologic Prophylaxis: VTE Score: 2 Lovenox    Patient Centered Rounds: I performed bedside rounds with nursing staff today  Discussions with Specialists or Other Care Team Provider: Nursing, case management, surgery    Education and Discussions with Family / Patient: Updated  (daughter) via phone  Total Time Spent on Date of Encounter in care of patient: 35 minutes This time was spent on one or more of the following: performing physical exam; counseling and coordination of care; obtaining or reviewing history; documenting in the medical record; reviewing/ordering tests, medications or procedures; communicating with other healthcare professionals and discussing with patient's family/caregivers  Current Length of Stay: 7 day(s)  Current Patient Status: Inpatient   Certification Statement: The patient will continue to require additional inpatient hospital stay due to Bowel obstruction  Discharge Plan: Anticipate discharge tomorrow to rehab facility      Code Status: Level 1 - Full Code    Subjective:   [place] [facility]    Objective:     Vitals:   Temp (24hrs), Av °F (37 2 °C), Min:98 7 °F (37 1 °C), Max:99 1 °F (37 3 °C)    Temp:  [98 7 °F (37 1 °C)-99 1 °F (37 3 °C)] 98 7 °F (37 1 °C)  HR:  [65-76] 71  Resp:  [18] 18  BP: (102-137)/(83-88) 120/83  SpO2:  [93 %-96 %] 93 %  Body mass index is 30 2 kg/m²  Input and Output Summary (last 24 hours): Intake/Output Summary (Last 24 hours) at 3/31/2023 1702  Last data filed at 3/31/2023 1658  Gross per 24 hour   Intake 480 ml   Output --   Net 480 ml       Physical Exam:   Physical Exam  Constitutional:       General: She is not in acute distress  HENT:      Head: Atraumatic  Cardiovascular:      Rate and Rhythm: Normal rate and regular rhythm  Heart sounds: No murmur heard  No friction rub  No gallop  Pulmonary:      Effort: Pulmonary effort is normal  No respiratory distress  Breath sounds: Normal breath sounds  No wheezing  Abdominal:      General: Bowel sounds are normal  There is no distension  Palpations: Abdomen is soft  Tenderness: There is no abdominal tenderness  Musculoskeletal:         General: No swelling  Cervical back: Neck supple  Skin:     General: Skin is warm and dry  Neurological:      General: No focal deficit present  Mental Status: She is alert  Mental status is at baseline     Psychiatric:         Mood and Affect: Mood normal         Additional Data:     Labs:  Results from last 7 days   Lab Units 03/30/23  0443 03/29/23  0502   WBC Thousand/uL 3 80* 3 70*   HEMOGLOBIN g/dL 10 3* 11 3*   HEMATOCRIT % 31 8* 34 6*   PLATELETS Thousands/uL 265 264   BANDS PCT % 1  --    NEUTROS PCT %  --  45   LYMPHS PCT %  --  38   LYMPHO PCT % 42  --    MONOS PCT %  --  11   MONO PCT % 10  --    EOS PCT % 7* 5     Results from last 7 days   Lab Units 03/31/23  0533 03/30/23  0443 03/29/23  0502   SODIUM mmol/L 141   < > 142   POTASSIUM mmol/L 3 4*   < > 3 5   CHLORIDE mmol/L 106   < > 110*   CO2 mmol/L 27   < > 25   BUN mg/dL 6   < > 2*   CREATININE mg/dL 0 82   < > 0 62   ANION GAP mmol/L 8   < > 7   CALCIUM mg/dL 8 0*   < > 8 1*   ALBUMIN g/dL  --   --  3 4*   TOTAL BILIRUBIN mg/dL  --   --  0 98   ALK PHOS U/L  --   --  51   ALT U/L  --   --  7   AST U/L  --   --  9*   GLUCOSE RANDOM mg/dL 85   < > 75    < > = values in this interval not displayed  Lines/Drains:  Invasive Devices     Peripheral Intravenous Line  Duration           Peripheral IV 03/30/23 Dorsal (posterior); Left Forearm 1 day                      Imaging: Reviewed radiology reports from this admission including: abdominal/pelvic CT and xray(s)    Recent Cultures (last 7 days):         Last 24 Hours Medication List:   Current Facility-Administered Medications   Medication Dose Route Frequency Provider Last Rate   • acetaminophen  650 mg Oral Q6H PRN Cortney Holden PA-C     • aluminum-magnesium hydroxide-simethicone  30 mL Oral Q6H PRN Cortney Holden PA-C     • amLODIPine  10 mg Oral Daily Cortney Holden PA-C     • aspirin  81 mg Oral Daily Cortney Holden PA-C     • atorvastatin  40 mg Oral After Emanuel Calderon PA-C     • benztropine  1 mg Oral BID Cortney Holden PA-C     • busPIRone  20 mg Oral TID Cortney Holden PA-C     • enoxaparin  40 mg Subcutaneous Q24H [address] Chrissy Boggs MD     • ferrous sulfate  325 mg Oral Daily With Breakfast Cortney Holden PA-C     • folic acid  5,078 mcg Oral Daily Cortney Holden PA-C     • HYDROmorphone  0 5 mg Intravenous Q4H PRN Chrissy Boggs MD     • hydrOXYzine HCL  50 mg Oral TID PRN Cortney Holden PA-C     • lithium carbonate  300 mg Oral HS Cortney Holden PA-C     • LORazepam  0 5 mg Oral BID PRN Cortney Holden PA-C     • mirtazapine  7 5 mg Oral QPM Cortney Holden PA-C     • ondansetron  4 mg Intravenous Q6H PRN Cortney Holden PA-C     • oxyCODONE  5 mg Oral Q4H PRN Chrissy Boggs MD     • pantoprazole  40 mg Intravenous Q24H [address] Chrissy Boggs MD     • PARoxetine  30 mg Oral BID Cortney Holdne PA-C     • PARoxetine  60 mg Oral HS Mo Dye PA-C     • prazosin  2 mg Oral HS Mo Dye PA-C     • pregabalin  50 mg Oral BID Mo Dye PA-C     • QUEtiapine  50 mg Oral TID Mo Dye PA-C     • Valbenazine Tosylate  40 mg Oral Daily Mo Dye [place]          Today, Patient Was Seen By: Willis Hodgson MD    **Please Note: This note may have been constructed using a voice recognition system  **

## 2023-03-31 NOTE — PLAN OF CARE
Problem: Potential for Falls  Goal: Patient will remain free of falls  Description: INTERVENTIONS:  - Educate patient/family on patient safety including physical limitations  - Instruct patient to call for assistance with activity   - Consult OT/PT to assist with strengthening/mobility   - Keep Call bell within reach  - Keep bed low and locked with side rails adjusted as appropriate  - Keep care items and personal belongings within reach  - Initiate and maintain comfort rounds  - Make Fall Risk Sign visible to staff  - Offer Toileting   - Initiate/Maintain   - Obtain necessary fall risk management equipment:   - Apply yellow socks and bracelet for high fall risk patients  - Consider moving patient to room near nurses station  Outcome: Progressing     Problem: PAIN - ADULT  Goal: Verbalizes/displays adequate comfort level or baseline comfort level  Description: Interventions:  - Encourage patient to monitor pain and request assistance  - Assess pain using appropriate pain scale  - Administer analgesics based on type and severity of pain and evaluate response  - Implement non-pharmacological measures as appropriate and evaluate response  - Consider cultural and social influences on pain and pain management  - Notify physician/advanced practitioner if interventions unsuccessful or patient reports new pain  Outcome: Progressing     Problem: INFECTION - ADULT  Goal: Absence or prevention of progression during hospitalization  Description: INTERVENTIONS:  - Assess and monitor for signs and symptoms of infection  - Monitor lab/diagnostic results  - Monitor all insertion sites, i e  indwelling lines, tubes, and drains  - Monitor endotracheal if appropriate and nasal secretions for changes in amount and color  - [place] appropriate cooling/warming therapies per order  - Administer medications as ordered  - Instruct and encourage patient and family to use good hand hygiene technique  - Identify and instruct in appropriate isolation precautions for identified infection/condition  Outcome: Progressing     Problem: SAFETY ADULT  Goal: Patient will remain free of falls  Description: INTERVENTIONS:  - Educate patient/family on patient safety including physical limitations  - Instruct patient to call for assistance with activity   - Consult OT/PT to assist with strengthening/mobility   - Keep Call bell within reach  - Keep bed low and locked with side rails adjusted as appropriate  - Keep care items and personal belongings within reach  - Initiate and maintain comfort rounds  - Make Fall Risk Sign visible to staff  - Offer Toileting   - Initiate/Maintain   - Obtain necessary fall risk management equipment:   - Apply yellow socks and bracelet for high fall risk patients  - Consider moving patient to room near nurses station  Outcome: Progressing  Goal: Maintain or return to baseline ADL function  Description: INTERVENTIONS:  -  Assess patient's ability to carry out ADLs; assess patient's baseline for ADL function and identify physical deficits which impact ability to perform ADLs (bathing, care of mouth/teeth, toileting, grooming, dressing, etc )  - Assess/evaluate cause of self-care deficits   - Assess range of motion  - Assess patient's mobility; develop plan if impaired  - Assess patient's need for assistive devices and provide as appropriate  - Encourage maximum independence but intervene and supervise when necessary  - Involve family in performance of ADLs  - Assess for home care needs following discharge   - Consider OT consult to assist with ADL evaluation and planning for discharge  - Provide patient education as appropriate  Outcome: Progressing  Goal: Maintains/Returns to pre admission functional level  Description: INTERVENTIONS:  - Perform BMAT or MOVE assessment daily    - Set and communicate daily mobility goal to care team and patient/family/caregiver     - Collaborate with rehabilitation services on mobility goals if consulted  - Perform Range of Motion   - Reposition patient   - Dangle patient   - Stand patient   - Ambulate patient   - Out of bed to chair   - Out of bed for meals   - Out of bed for toileting  - Record patient progress and toleration of activity level   Outcome: Progressing     Problem: DISCHARGE PLANNING  Goal: Discharge to home or other facility with appropriate resources  Description: INTERVENTIONS:  - Identify barriers to discharge w/patient and caregiver  - Arrange for needed discharge resources and transportation as appropriate  - Identify discharge learning needs (meds, wound care, etc )  - Arrange for interpretive services to assist at discharge as needed  - Refer to Case Management Department for coordinating discharge planning if the patient needs post-hospital services based on physician/advanced practitioner order or complex needs related to functional status, cognitive ability, or social support system  Outcome: Progressing     Problem: Knowledge Deficit  Goal: Patient/family/caregiver demonstrates understanding of disease process, treatment plan, medications, and discharge instructions  Description: Complete learning assessment and assess knowledge base    Interventions:  - Provide teaching at level of understanding  - Provide teaching via preferred learning methods  Outcome: Progressing     Problem: MOBILITY - ADULT  Goal: Maintain or return to baseline ADL function  Description: INTERVENTIONS:  -  Assess patient's ability to carry out ADLs; assess patient's baseline for ADL function and identify physical deficits which impact ability to perform ADLs (bathing, care of mouth/teeth, toileting, grooming, dressing, etc )  - Assess/evaluate cause of self-care deficits   - Assess range of motion  - Assess patient's mobility; develop plan if impaired  - Assess patient's need for assistive devices and provide as appropriate  - Encourage maximum independence but intervene and supervise when necessary  - Involve family in performance of ADLs  - Assess for home care needs following discharge   - Consider OT consult to assist with ADL evaluation and planning for discharge  - Provide patient education as appropriate  Outcome: Progressing  Goal: Maintains/Returns to pre admission functional level  Description: INTERVENTIONS:  - Perform BMAT or MOVE assessment daily    - Set and communicate daily mobility goal to care team and patient/family/caregiver     - Collaborate with rehabilitation services on mobility goals if consulted  - Perform Range of Motion   - Reposition patient   - Dangle patient   - Stand patient   - Ambulate patient   - Out of bed to chair   - Out of bed for meals   - Out of bed for toileting  - Record patient progress and toleration of activity level   Outcome: Progressing

## 2023-03-31 NOTE — CASE MANAGEMENT
Niki Ortega 48 has received approved authorization from insurance:   [address] [place], [place] [address]kelsea received for: [facility]  Facility: [facility] We07-A 1498 #: [id number]  Start of Care: 3/31  Next Review Date: 4/4  Care Coordinator: Chantel YANG#: [phone]  Submit next review to: [phone]   Care Manager notified: Natalia Doshi

## 2023-03-31 NOTE — ASSESSMENT & PLAN NOTE
Patient presented to the ED following multiple episodes of vomiting at home x one day  She reports two episodes of diarrhea at home, patient had multiple episodes of vomiting in the ED      · CT abdomen/pelvis showed mid to distal high-grade small bowel obstruction possibly due to adhesions  · Surgery consultation appreciated  · Surgery recommended NGT however attempts unsuccessful after which patient refused  · 3/27 started on clear liquid diet, had a bowel movement  · 3/28 morning patient complaining of no flatulence, no bowel movement, distension after eating  · 3/28 CT abdomen pelvis with p o  contrast showed ileus versus partial small bowel obstruction, partially decompressed distal small bowel loops without evidence of discrete transition point  · 3/29 Obstruction series showed contrast in right colon  · Advance diet to low residue diet, tolerating well  · Had BM  · Encourage ambulation  · Pain control

## 2023-03-31 NOTE — OCCUPATIONAL THERAPY NOTE
Occupational Therapy Progress Note     Patient Name: Aquilino Lemos  TBOUM'B Date: 3/31/2023  Problem List  Principal Problem:    Small bowel obstruction (HCC)  Active Problems:    Chronic pain disorder    Bipolar II disorder (HCC)    Essential hypertension    Tardive dyskinesia    Mixed hyperlipidemia    Hypokalemia    Anemia    Memory loss    Fall        03/31/23 5765   OT Last Visit   OT Visit Date 03/31/23   Note Type   Note Type Treatment   Pain Assessment   Pain Assessment Tool 0-10   Pain Score 8   Pain Location/Orientation Orientation: Right;Location: Hip   Hospital Pain Intervention(s) Repositioned; Ambulation/increased activity; Emotional support; Rest   Restrictions/Precautions   Weight Bearing Precautions Per Order No   Other Precautions Fall Risk;Pain   ADL   Where Assessed Chair   Grooming Assistance 4  Minimal Assistance   Grooming Deficit Steadying;Supervision/safety; Increased time to complete   Grooming Comments standing at sink   UB Bathing Assistance 5  Supervision/Setup   UB Bathing Deficit Verbal cueing;Supervision/safety; Increased time to complete   UB Bathing Comments s/u in chair, seated   LB Bathing Assistance 3  Moderate Assistance   LB Bathing Deficit Steadying;Verbal cueing;Supervision/safety; Increased time to complete;Right lower leg including foot; Buttocks; Perineal area   LB Bathing Comments seated>standing at chair   [address] Dressing Deficit Steadying;Verbal cueing;Supervision/safety; Increased time to complete   UB Dressing Comments donning gown   LB Dressing Assistance 2  Maximal Assistance   LB Dressing Deficit Setup; Requires assistive device for steadying;Verbal cueing;Supervision/safety; Increased time to complete; Don/doff R sock; Don/doff L sock; Thread RLE into underwear; Thread LLE into underwear;Pull up over hips   Toileting Assistance  3  Moderate Assistance   Toileting Deficit Setup;Steadying;Verbal cueing;Supervison/safety; Increased "time to complete; Bedside commode;Clothing management up;Clothing management down;Perineal hygiene   Bed Mobility   Supine to Sit 4  Minimal assistance   Additional items Assist x 1;HOB elevated; Bedrails; Increased time required   Sit to Supine 3  Moderate assistance   Additional items Assist x 1; Increased time required;Verbal cues;LE management; Bedrails   Transfers   Sit to Stand 4  Minimal assistance  (modA progressing to Lisandro)   Additional items Assist x 1; Increased time required;Verbal cues; Bedrails; Other  (RW)   Stand to Sit 4  Minimal assistance   Additional items Assist x 1; Increased time required;Verbal cues; Bedrails; Impulsive; Other  (RW)   Toilet transfer 4  Minimal assistance   Additional items Assist x 1;Standard toilet; Other; Increased time required; Impulsive  (grab bars, RW)   Functional Mobility   Functional Mobility 4  Minimal assistance   Additional items Rolling walker   Subjective   Subjective \"I'm tired\"   Cognition   Overall Cognitive Status Impaired   Arousal/Participation Responsive; Cooperative   Attention Attends with cues to redirect   Orientation Level Oriented X4   Memory Decreased recall of precautions   Following Commands Follows one step commands without difficulty   Activity Tolerance   Activity Tolerance Patient limited by fatigue;Patient limited by pain   Medical Staff Made Aware Yes   Assessment   Assessment Patient participated in skilled OT session this date with interventions consisting of therapeutic activities and self-care/ADL training to increase B UE strength, functional endurance/activity tolerance, static/dynamic sitting/standing balance, and overall safety awareness to increase (I) with ADLs/IADLs to return to PLOF  Provided education on energy conservation techniques, deep breathing techniques, fall prevention strategies, and overall safety awareness  Patient agreeable to OT treatment session, upon arrival patient was found supine in bed   Patient requiring verbal cues for " safety and verbal cues for pacing through activity steps  Please refer to flowsheet for functional performance  Patient continues to be functioning below baseline level, occupational performance remains limited secondary to factors listed above and increased risk for falls and injury  Pt left supine in bed, alarm armed and call bell and bedside table within reach; all needs met  Plan   Treatment Interventions ADL retraining;Functional transfer training;UE strengthening/ROM; Endurance training;Cognitive reorientation;Patient/family training; Compensatory technique education;Continued evaluation   Goal Expiration Date 04/07/23   OT Treatment Day 2   OT Frequency 2-3x/wk   Recommendation   OT Discharge Recommendation Post acute rehabilitation services   AM-PAC Daily Activity Inpatient   Lower Body Dressing 2   Bathing 2   Toileting 3   Upper Body Dressing 3   Grooming 4   Eating 4   Daily Activity Raw Score 18   Daily Activity Standardized Score (Calc for Raw Score >=11) 38 66   AM-PAC Applied Cognition Inpatient   Following a Speech/Presentation 3   Understanding Ordinary Conversation 3   Taking Medications 2   Remembering Where Things Are Placed or Put Away 2   Remembering List of 4-5 Errands 2   Taking Care of Complicated Tasks 2   Applied Cognition Raw Score 14   Applied Cognition Standardized Score 32 02     Manish Martinez

## 2023-03-31 NOTE — CASE MANAGEMENT
Case Management Discharge Planning Note    Patient name Cortez Sneed  Location [address] [place] [address] [address]  [phone]-* MRN [id number]  : 1965 Date 3/31/2023       Current Admission Date: 3/23/2023  Current Admission Diagnosis:Small bowel obstruction [facility])   Patient Active Problem List    Diagnosis Date Noted   • Fall 2023   • Small bowel obstruction ([facility] ) 2023   • Memory loss 2023   • Hemiplegia, post-stroke ([facility] ) 2022   • Anemia 2022   • Status post total knee replacement, left 2022   • Seizure-like activity ([facility] ) 2022   • Hypokalemia 2022   • Constipation 03/15/2022   • S/P total knee arthroplasty, right 03/10/2022   • CVA (cerebral vascular accident) ([facility] ) 2022   • Preoperative evaluation to rule out surgical contraindication 2022   • Leukopenia 2021   • Mixed hyperlipidemia 2021   • Medical clearance for psychiatric admission 2021   • History of CVA (cerebrovascular accident) 2021   • Encephalopathy 2021   • Nausea 2021   • Multiple closed fractures of metatarsal bone of right foot 2021   • Chronic back pain 2021   • Arthritis of right knee 10/06/2020   • Dysphagia 2020   • Ambulatory dysfunction 2020   • Status post insertion of spinal cord stimulator 2020   • Superficial bacterial infection of skin 2020   • Scar of back 2020   • Impaired skin integrity associated with surgical incision 2020   • Rash 2020   • Primary osteoarthritis of both knees 2020   • Patellofemoral disorder of both knees 2020   • Tardive dyskinesia 2020   • MIMI (obstructive sleep apnea)    • Complaint related to dreams 2020   • Family history of colorectal cancer 2019   • Encounter for long-term use of opiate analgesic 2019   • Post laminectomy syndrome 10/07/2019   • Lumbar disc disease with radiculopathy 2018   • Spinal stenosis of lumbar region with neurogenic claudication 02/02/2018   • Lumbar radiculopathy 12/08/2017   • Bilateral hip pain 06/19/2017   • Primary localized osteoarthritis of both knees 06/16/2017   • Chronic pain disorder 02/28/2017   • Opioid dependence ([facility] ) 02/28/2017   • Sacroiliitis ([facility] ) 02/28/2017   • Lumbar spondylosis 10/31/2016   • Osteoarthritis of both hips 10/31/2016   • Generalized anxiety disorder 10/26/2016   • Major depressive disorder, recurrent episode, moderate degree ([facility] ) 09/08/2016   • Right knee pain 06/06/2016   • Recent unexplained weight loss 06/06/2016   • Fatigue 10/26/2015   • B[facility] II disorder [facility] ) 06/18/2015   • Panic disorder without agoraphobia 06/18/2015   • Post traumatic stress disorder 06/18/2015   • Left hip pain 07/25/2014   • Intractable low back pain 06/16/2014   • Tremor 06/12/2014   • Migraine 05/27/2014   • Esophageal reflux 05/01/2014   • Cognitive disorder 04/18/2014   • Female pelvic pain 10/30/2013   • Pain in joint, shoulder region 10/28/2013   • Overactive bladder 09/26/2013   • Osteoarthritis of knee 02/20/2013   • Insomnia 01/31/2013   • History of hypokalemia 01/03/2013   • Urinary incontinence 09/24/2012   • Vitamin D deficiency 09/18/2012   • Fibromyalgia 09/14/2012   • Essential hypertension 09/14/2012      LOS (days): 7  Geometric Mean LOS (GMLOS) (days): 3 00  Days to GMLOS:-4 5     OBJECTIVE:  Risk of Unplanned Readmission Score: 32 43         Current admission status: Inpatient   Preferred Pharmacy:   [address] [place] [address]  Phone: [phone] Fax: [phone]    [id number] [id number]  [address] [address]  Phone: [phone] Fax: Franc[place] [address] [place], [place] [address]  [address] [place] [address]  Phone: [phone] Fax: [phone] [address] [facility] [place] [address] [id number] [place] [address] [address] [facility]  Phone: [phone] Fax: [address], [address] [address]  Phone: [phone] Fax: [phone]    Primary Care Provider: Judah Covarrubias DO    Primary Insurance: [facility] [facility] REP  Secondary Insurance: Filemon Mendes Number: [id number]

## 2023-04-01 ENCOUNTER — APPOINTMENT (INPATIENT)
Dept: CT IMAGING | Facility: HOSPITAL | Age: 58
End: 2023-04-01

## 2023-04-01 PROBLEM — R56.9 WITNESSED SEIZURE-LIKE ACTIVITY (HCC): Status: ACTIVE | Noted: 2023-04-01

## 2023-04-01 LAB
ANION GAP SERPL CALCULATED.3IONS-SCNC: 11 MMOL/L (ref 4–13)
ANISOCYTOSIS BLD QL SMEAR: PRESENT
BASOPHILS # BLD MANUAL: 0 THOUSAND/UL (ref 0–0.1)
BASOPHILS NFR MAR MANUAL: 0 % (ref 0–1)
BUN SERPL-MCNC: 9 MG/DL (ref 5–25)
CALCIUM SERPL-MCNC: 8.7 MG/DL (ref 8.4–10.2)
CHLORIDE SERPL-SCNC: 104 MMOL/L (ref 96–108)
CK SERPL-CCNC: 57 U/L (ref 26–192)
CO2 SERPL-SCNC: 24 MMOL/L (ref 21–32)
CREAT SERPL-MCNC: 1.03 MG/DL (ref 0.6–1.3)
EOSINOPHIL # BLD MANUAL: 0.19 THOUSAND/UL (ref 0–0.4)
EOSINOPHIL NFR BLD MANUAL: 3 % (ref 0–6)
ERYTHROCYTE [DISTWIDTH] IN BLOOD BY AUTOMATED COUNT: 14.5 % (ref 11.6–15.1)
GFR SERPL CREATININE-BSD FRML MDRD: 59 ML/MIN/1.73SQ M
GLUCOSE SERPL-MCNC: 104 MG/DL (ref 65–140)
GLUCOSE SERPL-MCNC: 109 MG/DL (ref 65–140)
HCT VFR BLD AUTO: 43.2 % (ref 34.8–46.1)
HGB BLD-MCNC: 13.9 G/DL (ref 11.5–15.4)
LACTATE SERPL-SCNC: 0.9 MMOL/L (ref 0.5–2)
LACTATE SERPL-SCNC: 4.6 MMOL/L (ref 0.5–2)
LYMPHOCYTES # BLD AUTO: 2.84 THOUSAND/UL (ref 0.6–4.47)
LYMPHOCYTES # BLD AUTO: 46 % (ref 14–44)
MAGNESIUM SERPL-MCNC: 1.7 MG/DL (ref 1.9–2.7)
MCH RBC QN AUTO: 29.6 PG (ref 26.8–34.3)
MCHC RBC AUTO-ENTMCNC: 32.2 G/DL (ref 31.4–37.4)
MCV RBC AUTO: 92 FL (ref 82–98)
MONOCYTES # BLD AUTO: 0.31 THOUSAND/UL (ref 0–1.22)
MONOCYTES NFR BLD: 5 % (ref 4–12)
NEUTROPHILS # BLD MANUAL: 2.65 THOUSAND/UL (ref 1.85–7.62)
NEUTS SEG NFR BLD AUTO: 43 % (ref 43–75)
PLATELET # BLD AUTO: 325 THOUSANDS/UL (ref 149–390)
PLATELET BLD QL SMEAR: ADEQUATE
PMV BLD AUTO: 8.6 FL (ref 8.9–12.7)
POTASSIUM SERPL-SCNC: 3.6 MMOL/L (ref 3.5–5.3)
POTASSIUM SERPL-SCNC: 3.9 MMOL/L (ref 3.5–5.3)
RBC # BLD AUTO: 4.7 MILLION/UL (ref 3.81–5.12)
SODIUM SERPL-SCNC: 139 MMOL/L (ref 135–147)
VARIANT LYMPHS # BLD AUTO: 3 %
WBC # BLD AUTO: 6.17 THOUSAND/UL (ref 4.31–10.16)

## 2023-04-01 RX ORDER — MAGNESIUM SULFATE HEPTAHYDRATE 40 MG/ML
2 INJECTION, SOLUTION INTRAVENOUS ONCE
Status: COMPLETED | OUTPATIENT
Start: 2023-04-01 | End: 2023-04-01

## 2023-04-01 RX ORDER — POTASSIUM CHLORIDE 20 MEQ/1
40 TABLET, EXTENDED RELEASE ORAL ONCE
Status: COMPLETED | OUTPATIENT
Start: 2023-04-01 | End: 2023-04-01

## 2023-04-01 RX ORDER — LORAZEPAM 2 MG/ML
2 INJECTION INTRAMUSCULAR ONCE AS NEEDED
Status: DISCONTINUED | OUTPATIENT
Start: 2023-04-01 | End: 2023-04-01

## 2023-04-01 RX ORDER — POTASSIUM CHLORIDE 20 MEQ/1
40 TABLET, EXTENDED RELEASE ORAL DAILY
Qty: 4 TABLET | Refills: 0
Start: 2023-04-01 | End: 2023-04-03

## 2023-04-01 RX ORDER — LORAZEPAM 2 MG/ML
2 INJECTION INTRAMUSCULAR ONCE AS NEEDED
Status: DISCONTINUED | OUTPATIENT
Start: 2023-04-01 | End: 2023-04-06 | Stop reason: HOSPADM

## 2023-04-01 RX ADMIN — BUSPIRONE HYDROCHLORIDE 20 MG: 10 TABLET ORAL at 20:27

## 2023-04-01 RX ADMIN — PREGABALIN 50 MG: 50 CAPSULE ORAL at 18:18

## 2023-04-01 RX ADMIN — BENZTROPINE MESYLATE 1 MG: 1 TABLET ORAL at 18:18

## 2023-04-01 RX ADMIN — PAROXETINE 30 MG: 20 TABLET, FILM COATED ORAL at 18:18

## 2023-04-01 RX ADMIN — ASPIRIN 81 MG: 81 TABLET, COATED ORAL at 08:38

## 2023-04-01 RX ADMIN — HYDROMORPHONE HYDROCHLORIDE 0.5 MG: 1 INJECTION, SOLUTION INTRAMUSCULAR; INTRAVENOUS; SUBCUTANEOUS at 21:12

## 2023-04-01 RX ADMIN — POTASSIUM CHLORIDE 40 MEQ: 1500 TABLET, EXTENDED RELEASE ORAL at 11:38

## 2023-04-01 RX ADMIN — PREGABALIN 50 MG: 50 CAPSULE ORAL at 08:39

## 2023-04-01 RX ADMIN — SODIUM CHLORIDE 500 ML: 0.9 INJECTION, SOLUTION INTRAVENOUS at 15:02

## 2023-04-01 RX ADMIN — ENOXAPARIN SODIUM 40 MG: 100 INJECTION SUBCUTANEOUS at 08:46

## 2023-04-01 RX ADMIN — ALUMINUM HYDROXIDE, MAGNESIUM HYDROXIDE, AND DIMETHICONE 30 ML: 200; 20; 200 SUSPENSION ORAL at 19:01

## 2023-04-01 RX ADMIN — BENZTROPINE MESYLATE 1 MG: 1 TABLET ORAL at 08:39

## 2023-04-01 RX ADMIN — PANTOPRAZOLE SODIUM 40 MG: 40 TABLET, DELAYED RELEASE ORAL at 06:35

## 2023-04-01 RX ADMIN — BUSPIRONE HYDROCHLORIDE 20 MG: 10 TABLET ORAL at 08:38

## 2023-04-01 RX ADMIN — PAROXETINE 60 MG: 20 TABLET, FILM COATED ORAL at 21:11

## 2023-04-01 RX ADMIN — FOLIC ACID 1000 MCG: 1 TABLET ORAL at 08:38

## 2023-04-01 RX ADMIN — MAGNESIUM SULFATE HEPTAHYDRATE 2 G: 40 INJECTION, SOLUTION INTRAVENOUS at 16:03

## 2023-04-01 RX ADMIN — FERROUS SULFATE TAB 325 MG (65 MG ELEMENTAL FE) 325 MG: 325 (65 FE) TAB at 06:35

## 2023-04-01 RX ADMIN — QUETIAPINE FUMARATE 50 MG: 25 TABLET ORAL at 20:27

## 2023-04-01 RX ADMIN — OXYCODONE 5 MG: 5 TABLET ORAL at 06:38

## 2023-04-01 RX ADMIN — LITHIUM CARBONATE 300 MG: 300 TABLET, FILM COATED, EXTENDED RELEASE ORAL at 21:11

## 2023-04-01 RX ADMIN — QUETIAPINE FUMARATE 50 MG: 25 TABLET ORAL at 16:13

## 2023-04-01 RX ADMIN — MIRTAZAPINE 7.5 MG: 15 TABLET, FILM COATED ORAL at 18:18

## 2023-04-01 RX ADMIN — PRAZOSIN HYDROCHLORIDE 2 MG: 2 CAPSULE ORAL at 21:11

## 2023-04-01 RX ADMIN — ACETAMINOPHEN 650 MG: 325 TABLET ORAL at 19:06

## 2023-04-01 RX ADMIN — ATORVASTATIN CALCIUM 40 MG: 40 TABLET, FILM COATED ORAL at 18:18

## 2023-04-01 RX ADMIN — AMLODIPINE BESYLATE 10 MG: 10 TABLET ORAL at 08:39

## 2023-04-01 RX ADMIN — PAROXETINE 30 MG: 20 TABLET, FILM COATED ORAL at 08:45

## 2023-04-01 RX ADMIN — BUSPIRONE HYDROCHLORIDE 20 MG: 10 TABLET ORAL at 16:13

## 2023-04-01 RX ADMIN — QUETIAPINE FUMARATE 50 MG: 25 TABLET ORAL at 08:38

## 2023-04-01 RX ADMIN — LEVETIRACETAM 1000 MG: 100 INJECTION, SOLUTION INTRAVENOUS at 20:23

## 2023-04-01 NOTE — QUICK NOTE
Pt family brought pt's home medications to bedside, stating they had been asked to provide a home medication but were unsure which one  RN looked through bag for the requested Dulce Quita but the provided bottle was empty  Family stated that she had no more Ingrezza at home, they were advised to request a refill from PCP and bring medication in when it is available  All home medications were returned home with the family

## 2023-04-01 NOTE — ASSESSMENT & PLAN NOTE
Patient presented to the ED following multiple episodes of vomiting at home x one day  She reports two episodes of diarrhea at home, patient had multiple episodes of vomiting in the ED      · CT abdomen/pelvis showed mid to distal high-grade small bowel obstruction possibly due to adhesions  · Surgery consultation appreciated  · Surgery recommended NGT however attempts unsuccessful after which patient refused  · 3/27 started on clear liquid diet, had a bowel movement  · 3/28 morning patient complaining of no flatulence, no bowel movement, distension after eating  · 3/28 CT abdomen pelvis with p o  contrast showed ileus versus partial small bowel obstruction, partially decompressed distal small bowel loops without evidence of discrete transition point  · 3/29 Obstruction series showed contrast in right colon  · Advanced diet to low residue diet, tolerating well  · Has Bowel movement daily  · Encourage ambulation

## 2023-04-01 NOTE — DISCHARGE SUMMARY
[address] [facility]  Discharge- Samantha Emogene Sober 1965, 59 y o  female MRN: [id number]  Unit/Bed#: E5 -01 Encounter: [id number]  Primary Care Provider: Krish Garcia DO   Date and time admitted to hospital: 3/23/2023  9:35 PM    * Small bowel obstruction [facility])  Assessment & Plan  Patient presented to the ED following multiple episodes of vomiting at home x one day  She reports two episodes of diarrhea at home, patient had multiple episodes of vomiting in the ED      · CT abdomen/pelvis showed mid to distal high-grade small bowel obstruction possibly due to adhesions  · Surgery consultation appreciated  · Surgery recommended NGT however attempts unsuccessful after which patient refused  · 3/27 started on clear liquid diet, had a bowel movement  · 3/28 morning patient complaining of no flatulence, no bowel movement, distension after eating  · 3/28 CT abdomen pelvis with p o  contrast showed ileus versus partial small bowel obstruction, partially decompressed distal small bowel loops without evidence of discrete transition point  · 3/29 Obstruction series showed contrast in right colon  · Advanced diet to low residue diet, tolerating well  · Has Bowel movement daily  · Encourage ambulation      Fall  Assessment & Plan  · Patient was watching herself at the scene, became dizzy, before falling down PCA who was next to her caught her and lowered down on her right side, denies hitting her head  · No obvious injury  · Vitals were stable after the event  · Right hip x-ray right knee x-ray without osseous abnormality    Memory loss  Assessment & Plan  · Patient not great historian   · Followed outpatient by neurology for this issue   · Scheduled to follow-up outpatient with neuropsych after ENT hearing evaluation completed    Anemia  Assessment & Plan  · All cell lines dropped, most likely dilutional  · Hemoglobin has been stable in the range 10/11  · Continue to monitor    Hypokalemia  Assessment & Plan  · Continue to replete as needed    Mixed hyperlipidemia  Assessment & Plan  · Continue statin    Tardive dyskinesia  Assessment & Plan  · Patient with chronic tardive dyskinesia secondary to psychiatric medications  · Continue 1 mg cogentin b i d   · Ingrezza 40 mg daily (nonformulary, family did not provide it)--see plan above    Essential hypertension  Assessment & Plan  · Continue amlodipine     Bipolar II disorder ([facility] )  Assessment & Plan  · Patient mood is stable with tardive dyskinesia at baseline  · Continue home medication regimen  · 50 mg seroquel t i d  · 300 mg lithium h s  · 30 mg paxil b i d  · 60 mg paxil h s   · 20 mg buspar t i d  · 40 mg ingrezza daily--nonformulary, not provided by family  · 7 5 mg remeron h s   · 50 mg hydroxyzine b i d prn  · For Ingrezza  Yesterday family brought in her medications from home, but she was out of [place] [facility] [place]  Family called the pharmacy, they will not refill it until she is seen by her psychiatrist  Per daughter, psych said, the provider taking care of her now should order it  There is a paperwork to be filled out, family will bring it in on Saturday to rehab, and per them CM has to fax it over to the pharmacy  Chronic pain disorder  Assessment & Plan  · Continue 50 mg pregabalin b i d        Medical Problems     Resolved Problems  Date Reviewed: 4/1/2023   None       Discharging Physician / Practitioner: Judson Gutierrez MD  PCP: Jamaica Bee DO  Admission Date:   Admission Orders (From admission, onward)     Ordered        03/24/23 0129  INPATIENT ADMISSION  Once                      Discharge Date: 04/01/23    Consultations During Hospital Stay:  · Surgery    Procedures Performed:   · None    Significant Findings / Test Results:   · CT chest abdomen pelvis with contrast  IMPRESSION:  Small amount of perihepatic and pelvic fluid       Mid to distal high-grade small bowel obstruction possibly due to adhesions  Groundglass changes in the right lower lobe may be due to respiration or developing infiltrate  · KUB  IMPRESSION:  Findings consistent with small bowel obstruction without appreciable change  · CT abdomen pelvis with oral contrast  IMPRESSION:  Findings suggestive of ileus versus partial small bowel obstruction  Partially decompressed distal small bowel loops without evidence of discrete transition point      · KUB  IMPRESSION:  Enteric contrast has progressed to the colon which excludes complete bowel obstruction  The bowel appears a little bit less prominent than on the prior imaging exams  · X-ray of the right hip  IMPRESSION:  No acute osseous abnormality  · X-ray of the right knee  IMPRESSION:  No acute osseous abnormality  Stable satisfactory appearance of knee prosthesis     Incidental Findings:   · none    Test Results Pending at Discharge (will require follow up):   · none     Outpatient Tests Requested:  · none    Complications:  none    Reason for Admission: Small bowel obstruction    Hospital Course:   Samantha Denton is a 59 y o  female patient who originally presented to the hospital on 3/23/2023 due to vomiting  CT abdomen pelvis showed high-grade small bowel obstruction possibly due to adhesions  She was evaluated by surgery  Unable to place NG tube later patient refused  She was placed on bowel rest and IV fluids with improvement  CT abdomen pelvis with oral contrast repeated 3/28 which showed ileus versus partial bowel obstruction, obstruction series and x-ray showed contrast in the right colon  Her diet was advanced to low residue diet, tolerating well  She has bowel movement daily  Encourage ambulation  Please see discharge recommendations for Ingrezza under bipolar disorder  Patient will be discharged to [address] [facility]  Please see above list of diagnoses and related plan for additional information       Condition at Discharge: good    Discharge Day "Visit / Exam:   Subjective: Patient was seen and evaluated bedside  She is feeling well, tolerating diet, occasionally still feels bloated after eating but that resolves  Vitals: Blood Pressure: 122/85 (04/01/23 0730)  Pulse: 79 (04/01/23 0730)  Temperature: 98 8 °F (37 1 °C) (04/01/23 0730)  Temp Source: Oral (03/31/23 1552)  Respirations: 16 (04/01/23 0730)  Height: 5' 1\" (154 9 cm) (03/24/23 0130)  Weight - Scale: 72 5 kg (159 lb 13 3 oz) (03/24/23 0130)  SpO2: 98 % (04/01/23 0730)  Exam:   Physical Exam  Constitutional:       General: She is not in acute distress  HENT:      Head: Atraumatic  Cardiovascular:      Rate and Rhythm: Normal rate and regular rhythm  Heart sounds: No murmur heard  No friction rub  No gallop  Pulmonary:      Effort: Pulmonary effort is normal  No respiratory distress  Breath sounds: Normal breath sounds  No wheezing  Abdominal:      General: Bowel sounds are normal  There is no distension  Palpations: Abdomen is soft  Tenderness: There is no abdominal tenderness  Musculoskeletal:         General: No swelling  Cervical back: Neck supple  Skin:     General: Skin is warm and dry  Neurological:      Mental Status: She is alert and oriented to person, place, and time  Comments: Tongue rolling   Psychiatric:         Mood and Affect: Mood normal           Discussion with Family: Discussed discharge plan with her daughter last night over the phone  Discharge instructions/Information to patient and family:   See after visit summary for information provided to patient and family  Provisions for Follow-Up Care:  See after visit summary for information related to follow-up care and any pertinent home health orders  Disposition:   Acute Rehab at [facility]    Planned Readmission: no     Discharge Statement:  I spent 40 minutes discharging the patient  This time was spent on the day of discharge   I had direct contact with the patient on the " day of discharge  Greater than 50% of the total time was spent examining patient, answering all patient questions, arranging and discussing plan of care with patient as well as directly providing post-discharge instructions  Additional time then spent on discharge activities  Discharge Medications:  See after visit summary for reconciled discharge medications provided to patient and/or family        **Please Note: This note may have been constructed using a voice recognition system**

## 2023-04-01 NOTE — NURSING NOTE
RN came to pt room after hearing a loud noise and pt was on the floor in the bathroom  Aide present with her said she stood up from the toilet and lost her balance and fell to the right, striking her head on the shower wall  RN assessed pt, she was alert and oriented, complaining of no pain, so she was assisted to stand and sit back on the toilet  Provider summoned to room to evaluate and vitals were obtained  No obvious signs of injury were found, upon questioning, the pt stated that the walker tipped slightly and caused her to lose her balance  She denied any lightheadedness or dizziness at the time, still endorsed no pain, VS WNL so pt ambulated back to the bed with RN and aide assisting  Pt was very anxious and somewhat unsteady  Pt sat at the side of the bed and RN proceeded with a full assessment  Pt became increasingly agitated and began having difficulty forming her words  Provider returned to bedside to speak with pt, and pt became even more agitated after provider explained that she needed a CT scan to evaluate for injury after the fall  Pt began grabbing at her mouth while trying to speak, and then closed her eyes and became unresponsive and limp  Pt placed on non-rebreather oxygen, rapid response was called

## 2023-04-01 NOTE — PROGRESS NOTES
Previous NPO/Clear liquids diet from 3/24-3/30 per diet order hx  Now on low fiber diet, fluid restriction and consuming 0-100% of meals per nursing flowsheets  Noting recent RRT called  Will order magic cup BID and ensure plus high PRO daily  Continue low fiber diet as ordered, fluid restriction per MD if medically indicated  Pt would benefit from current weight as able, last weight obtained 3/23

## 2023-04-01 NOTE — PROGRESS NOTES
The pantoprazole has been converted to Oral per [facility] IV-[facility]PO Auto-Conversion Protocol for Adults as approved by the Pharmacy and Therapeutics Committee  The patient met all eligible criteria:  1) Age >/= 25years old   2) Received at least one dose of the IV form   3) Receiving at least one other scheduled oral/enteral medication   4) Tolerating an oral/enteral diet   and did not have any exclusions:   1) Critical care patient   2) Active GI bleed (IF assessing H2RAs or PPIs)   3) Continuous tube feeding (IF assessing cipro, doxycycline, levofloxacin, minocycline, rifampin, or voriconazole)   4) Receiving PO vancomycin (IF assessing metronidazole)   5) Persistent nausea and/or vomiting   6) Ileus or gastrointestinal obstruction (resolved)  7) [place]/nasogastric tube set for continuous suction   8) Specific order not to automatically convert to PO (in the order's comments or if discussed in the most recent Infectious Disease or primary team's progress notes)

## 2023-04-01 NOTE — RAPID RESPONSE
Rapid Response Note  Samantha Johnson 59 y o  female MRN: [id number]  Unit/Bed#: E5 -01 Encounter: [id number]    Rapid Response Notification(s):   Response called date/time:  4/1/2023 1:04 PM  Response team arrival date/time:  4/1/2023 1:05 PM  Response end date/time:  4/1/2023 1:45 PM  [facility]  Rapid response location:  [facility] unit  Primary reason for rapid response call: Other (comment) (Seizure)    Rapid Response Intervention(s):   Airway:  None  Breathing:  Oxygen  Circulation:  None  Fluids administered:  None  Medications administered:  None       Assessment:   · Seizure-like activity  · Postictal   · Bipolar  · Tardive dyskinesia    Plan:   · CT of the head  · Suggest Keppra  · Suggest neuro consult  · Patient will remain in her room     Rapid Response Outcome:   Transfer:  Remain on floor  Primary service notified of transfer: Yes    Code Status: Level 1 (Full Code)        Family member contacted: Family present during rapid response     Background/Situation:   Samantha Werner is a 59 y o  female who was admitted initially 7 days ago for small bowel obstruction  She did not require surgery  She recovered well and was preparing to go home today  She was using her walker to go to the bathroom when she tripped and fell hitting her head  She was put back in bed and then began to demonstrate what appears to be a tonic-clonic grand mal seizure  Upon my arrival the patient is postictal   Initially completely unresponsive  Then beginning to open her eyes to voice and pain  She is not following commands  Melissa Lucia, her attending physician was in the room    Treatment recommendations were discussed and the patient was transported to the CT scanner    Review of Systems   Reason unable to perform ROS: Postictal        Objective:   Vitals:    04/01/23 0016 04/01/23 0730 04/01/23 1303 04/01/23 1305   BP: 114/70 122/85 124/98 124/98   Pulse: 71 79 (!) 124 (!) 106   Resp: 18 16 "  Temp: 98 7 °F (37 1 °C) 98 8 °F (37 1 °C)     TempSrc:       SpO2: 94% 98% 100% 98%   Weight:       Height:         Physical Exam  Vitals and nursing note reviewed  Constitutional:       Comments: Postictal   HENT:      Head: Normocephalic and atraumatic  Nose: Nose normal       Mouth/Throat:      Mouth: Mucous membranes are moist       Comments: Large tongue, tongue rolling secondary to tardive dyskinesia  Eyes:      Extraocular Movements: Extraocular movements intact  Pupils: Pupils are equal, round, and reactive to light  Cardiovascular:      Rate and Rhythm: Normal rate and regular rhythm  Pulses: Normal pulses  Heart sounds: Normal heart sounds  Pulmonary:      Effort: Pulmonary effort is normal       Breath sounds: Normal breath sounds  Abdominal:      General: Abdomen is flat  Bowel sounds are normal       Palpations: Abdomen is soft  Musculoskeletal:         General: Normal range of motion  Cervical back: Normal range of motion and neck supple  Skin:     General: Skin is warm and dry  Capillary Refill: Capillary refill takes less than 2 seconds  Neurological:      Comments: Postictal   Psychiatric:      Comments: Postictal         Portions of the record may have been created with voice recognition software  Occasional wrong word or \"sound a like\" substitutions may have occurred due to the inherent limitations of voice recognition software  Read the chart carefully and recognize, using context, where substitutions have occurred      [facility] Sneed    "

## 2023-04-01 NOTE — ASSESSMENT & PLAN NOTE
· Patient with chronic tardive dyskinesia secondary to psychiatric medications  · Continue 1 mg cogentin b i d   · Ingrezza 40 mg daily (nonformulary, family did not provide it)

## 2023-04-01 NOTE — ASSESSMENT & PLAN NOTE
· Patient mood is stable with tardive dyskinesia at baseline  · Continue home medication regimen  · 50 mg seroquel t i d  · 300 mg lithium h s  · 30 mg paxil b i d  · 60 mg paxil h s   · 20 mg buspar t i d  · 40 mg ingrezza daily--nonformulary, not provided by family  · 7 5 mg remeron h s   · 50 mg hydroxyzine b i d prn  · For Ingrezza  Yesterday family brought in her medications from home, but she was out of [facility]  Family called the pharmacy, they will not refill it until she is seen by her psychiatrist  Per daughter, psych said, the provider taking care of her now should order it  There is a paperwork to be filled out, family will bring it in on Saturday to rehab, and per them CM has to fax it over to the pharmacy

## 2023-04-01 NOTE — CASE MANAGEMENT
Case Management Discharge Planning Note    Patient name Karena Cartagena  Location [place] [address]  [id number] MRN [id number]  : 1965 Date 2023       Current Admission Date: 3/23/2023  Current Admission Diagnosis:Small bowel obstruction [facility])   Patient Active Problem List    Diagnosis Date Noted   • Witnessed seizure-like activity ([facility] ) 2023   • Fall 2023   • Small bowel obstruction ([facility] ) 2023   • Memory loss 2023   • Hemiplegia, post-stroke ([facility] ) 2022   • Anemia 2022   • Status post total knee replacement, left 2022   • [facility]   • Hypokalemia 2022   • Constipation 03/15/2022   • S/P total knee arthroplasty, right 03/10/2022   • CVA (cerebral vascular accident) ([facility] ) 2022   • Preoperative evaluation to rule out surgical contraindication 2022   • Leukopenia 2021   • Mixed hyperlipidemia 2021   • Medical clearance for psychiatric admission 2021   • History of CVA (cerebrovascular accident) 2021   • Encephalopathy 2021   • Nausea 2021   • Multiple closed fractures of metatarsal bone of right foot 2021   • Chronic back pain 2021   • Arthritis of right knee 10/06/2020   • Dysphagia 2020   • Ambulatory dysfunction 2020   • Status post insertion of spinal cord stimulator 2020   • Superficial bacterial infection of skin 2020   • Scar of back 2020   • Impaired skin integrity associated with surgical incision 2020   • Rash 2020   • Primary osteoarthritis of both knees 2020   • Patellofemoral disorder of both knees 2020   • Tardive dyskinesia 2020   • MIMI (obstructive sleep apnea)    • Complaint related to dreams 2020   • Family history of colorectal cancer 2019   • Encounter for long-term use of opiate analgesic 2019   • Post laminectomy syndrome 10/07/2019   • Lumbar disc disease with radiculopathy 02/02/2018   • Spinal stenosis of lumbar region with neurogenic claudication 02/02/2018   • Lumbar radiculopathy 12/08/2017   • Bilateral hip pain 06/19/2017   • Primary localized osteoarthritis of both knees 06/16/2017   • Chronic pain disorder 02/28/2017   • Opioid dependence ([facility] ) 02/28/2017   • Sacroiliitis ([facility] ) 02/28/2017   • Lumbar spondylosis 10/31/2016   • Osteoarthritis of both hips 10/31/2016   • Generalized anxiety disorder 10/26/2016   • Major depressive disorder, recurrent episode, moderate degree ([facility] ) 09/08/2016   • Right knee pain 06/06/2016   • Recent unexplained weight loss 06/06/2016   • Fatigue 10/26/2015   • B[facility] II [facility] 06/18/2015   • Panic disorder without agoraphobia 06/18/2015   • Post traumatic stress disorder 06/18/2015   • Left hip pain 07/25/2014   • Intractable low back pain 06/16/2014   • Tremor 06/12/2014   • Migraine 05/27/2014   • Esophageal reflux 05/01/2014   • Cognitive disorder 04/18/2014   • Female pelvic pain 10/30/2013   • Pain in joint, shoulder region 10/28/2013   • Overactive bladder 09/26/2013   • Osteoarthritis of knee 02/20/2013   • Insomnia 01/31/2013   • History of hypokalemia 01/03/2013   • Urinary incontinence 09/24/2012   • Vitamin D deficiency 09/18/2012   • Fibromyalgia 09/14/2012   • Essential hypertension 09/14/2012      LOS (days): 8  Geometric Mean LOS (GMLOS) (days): 3 00  Days to GMLOS:-5 6     OBJECTIVE:  Risk of Unplanned Readmission Score: 33 1         Current admission status: Inpatient   Preferred Pharmacy:   [address] [place] [address]  Phone: [phone] Fax: [phone]    [id number] [address]  [facility] [address]  Phone: [phone] Fax: [place], [address] [place] [address]  Phone: [phone] Fax: [phone]    [address] [facility],[address], [address]  [facility]  Phone: [phone] Fax: [address] [facility],[address], [address] [facility]  [address] [place] [address]  Phone: [phone] Fax: [phone]    Primary Care Provider: David Granados DO    Primary Insurance: [address] [place] [address],[address] [facility]  Secondary Insurance: [employer]    DISCHARGE DETAILS:     Additional Comments: Rapid Response called for Pt not long after CM received report that Pt had a fall  CM cancelled transport and updated TCF of same  CM following through discharge

## 2023-04-01 NOTE — ASSESSMENT & PLAN NOTE
· Patient was supposed to be discharged today at 1:30 pm to rehab  She walked to the bathroom, sat on the toilet, stood up without telling the aide that she is going to stand up, her walker tripped she fell and hit her head on the wall  She did not lose her consciousness  I evaluated her in the bathroom, she was sitting on the toilet, vitals normal   No obvious signs of injury  Transportation arrived and they were standing in front of the door to pick her up to go to rehab  I explained to the patient that due to the fall and head strike we will have to do a CT head and we will push back her transportation for later this afternoon or tomorrow morning  Patient became very emotional, she became unresponsive with minimal generalized body shaking, lasted for about 20 seconds  After that she was postictal   No bowel or bladder incontinence  · Rapid response was called  · Stat CT head showed no acute intracranial abnormality  · CBC/BMP/mag/prolactin/lactic acid/CK ordered  · Called the daughter, explained the event, she voiced understanding    · Patient had a witnessed seizure-like episode while admitted to [facility] in May 2022  EEG at that time was negative  She is following with [facility] neurology  Ambulatory EEG was done this in March 2023, nonspecific findings, mild diffuse cerebral dysfunction which could be due to psychotropic medications, no epileptiform discharges in 47 hours    · Not on AED  · Seizure precautions  · Neuro consult

## 2023-04-01 NOTE — ASSESSMENT & PLAN NOTE
· Patient with chronic tardive dyskinesia secondary to psychiatric medications  · Continue 1 mg cogentin b i d   · Ingrezza 40 mg daily (nonformulary, family did not provide it)--see plan above

## 2023-04-01 NOTE — PLAN OF CARE
Problem: Potential for Falls  Goal: Patient will remain free of falls  Description: INTERVENTIONS:  - Educate patient/family on patient safety including physical limitations  - Instruct patient to call for assistance with activity   - Consult OT/PT to assist with strengthening/mobility   - Keep Call bell within reach  - Keep bed low and locked with side rails adjusted as appropriate  - Keep care items and personal belongings within reach  - Initiate and maintain comfort rounds  - Make Fall Risk Sign visible to staff  - Offer Toileting every 2 Hours, in advance of need  - Initiate/Maintain bed alarm  - Obtain necessary fall risk management equipment  - Apply yellow socks and bracelet for high fall risk patients  - Consider moving patient to room near nurses station  Outcome: Progressing     Problem: PAIN - ADULT  Goal: Verbalizes/displays adequate comfort level or baseline comfort level  Description: Interventions:  - Encourage patient to monitor pain and request assistance  - Assess pain using appropriate pain scale  - Administer analgesics based on type and severity of pain and evaluate response  - Implement non-pharmacological measures as appropriate and evaluate response  - Consider cultural and social influences on pain and pain management  - Notify physician/advanced practitioner if interventions unsuccessful or patient reports new pain  Outcome: Progressing     Problem: INFECTION - ADULT  Goal: Absence or prevention of progression during hospitalization  Description: INTERVENTIONS:  - Assess and monitor for signs and symptoms of infection  - Monitor lab/diagnostic results  - Monitor all insertion sites, i e  indwelling lines, tubes, and drains  - Monitor endotracheal if appropriate and nasal secretions for changes in amount and color  - [place] appropriate cooling/warming therapies per order  - Administer medications as ordered  - Instruct and encourage patient and family to use good hand hygiene technique  - Identify and instruct in appropriate isolation precautions for identified infection/condition  Outcome: Progressing     Problem: SAFETY ADULT  Goal: Patient will remain free of falls  Description: INTERVENTIONS:  - Educate patient/family on patient safety including physical limitations  - Instruct patient to call for assistance with activity   - Consult OT/PT to assist with strengthening/mobility   - Keep Call bell within reach  - Keep bed low and locked with side rails adjusted as appropriate  - Keep care items and personal belongings within reach  - Initiate and maintain comfort rounds  - Make Fall Risk Sign visible to staff  - Offer Toileting every 2 Hours, in advance of need  - Initiate/Maintain bed alarm  - Obtain necessary fall risk management equipment  - Apply yellow socks and bracelet for high fall risk patients  - Consider moving patient to room near nurses station  Outcome: Progressing  Goal: Maintain or return to baseline ADL function  Description: INTERVENTIONS:  -  Assess patient's ability to carry out ADLs; assess patient's baseline for ADL function and identify physical deficits which impact ability to perform ADLs (bathing, care of mouth/teeth, toileting, grooming, dressing, etc )  - Assess/evaluate cause of self-care deficits   - Assess range of motion  - Assess patient's mobility; develop plan if impaired  - Assess patient's need for assistive devices and provide as appropriate  - Encourage maximum independence but intervene and supervise when necessary  - Involve family in performance of ADLs  - Assess for home care needs following discharge   - Consider OT consult to assist with ADL evaluation and planning for discharge  - Provide patient education as appropriate  Outcome: Progressing  Goal: Maintains/Returns to pre admission functional level  Description: INTERVENTIONS:  - Perform BMAT or MOVE assessment daily    - Set and communicate daily mobility goal to care team and patient/family/caregiver  - Collaborate with rehabilitation services on mobility goals if consulted  - Perform Range of Motion 3 times a day  - Reposition patient every 2 hours  - Dangle patient 3 times a day  - Stand patient 3 times a day  - Ambulate patient 3 times a day  - Out of bed to chair 3 times a day   - Out of bed for meals 3 times a day  - Out of bed for toileting  - Record patient progress and toleration of activity level   Outcome: Progressing     Problem: DISCHARGE PLANNING  Goal: Discharge to home or other facility with appropriate resources  Description: INTERVENTIONS:  - Identify barriers to discharge w/patient and caregiver  - Arrange for needed discharge resources and transportation as appropriate  - Identify discharge learning needs (meds, wound care, etc )  - Arrange for interpretive services to assist at discharge as needed  - Refer to Case Management Department for coordinating discharge planning if the patient needs post-hospital services based on physician/advanced practitioner order or complex needs related to functional status, cognitive ability, or social support system  Outcome: Progressing     Problem: Knowledge Deficit  Goal: Patient/family/caregiver demonstrates understanding of disease process, treatment plan, medications, and discharge instructions  Description: Complete learning assessment and assess knowledge base    Interventions:  - Provide teaching at level of understanding  - Provide teaching via preferred learning methods  Outcome: Progressing     Problem: MOBILITY - ADULT  Goal: Maintain or return to baseline ADL function  Description: INTERVENTIONS:  -  Assess patient's ability to carry out ADLs; assess patient's baseline for ADL function and identify physical deficits which impact ability to perform ADLs (bathing, care of mouth/teeth, toileting, grooming, dressing, etc )  - Assess/evaluate cause of self-care deficits   - Assess range of motion  - Assess patient's mobility; develop plan if impaired  - Assess patient's need for assistive devices and provide as appropriate  - Encourage maximum independence but intervene and supervise when necessary  - Involve family in performance of ADLs  - Assess for home care needs following discharge   - Consider OT consult to assist with ADL evaluation and planning for discharge  - Provide patient education as appropriate  Outcome: Progressing  Goal: Maintains/Returns to pre admission functional level  Description: INTERVENTIONS:  - Perform BMAT or MOVE assessment daily    - Set and communicate daily mobility goal to care team and patient/family/caregiver  - Collaborate with rehabilitation services on mobility goals if consulted  - Perform Range of Motion 3 times a day  - Reposition patient every 2 hours  - Dangle patient 3 times a day  - Stand patient 3 times a day  - Ambulate patient 3 times a day  - Out of bed to chair 3 times a day   - Out of bed for meals 3 times a day  - Out of bed for toileting  - Record patient progress and toleration of activity level   Outcome: Progressing     Problem: Nutrition/Hydration-ADULT  Goal: Nutrient/Hydration intake appropriate for improving, restoring or maintaining nutritional needs  Description: Monitor and assess patient's nutrition/hydration status for malnutrition  Collaborate with interdisciplinary team and initiate plan and interventions as ordered  Monitor patient's weight and dietary intake as ordered or per policy  Utilize nutrition screening tool and intervene as necessary  Determine patient's food preferences and provide high-protein, high-caloric foods as appropriate       INTERVENTIONS:  - Monitor oral intake, urinary output, labs, and treatment plans  - Assess nutrition and hydration status and recommend course of action  - Evaluate amount of meals eaten  - Assist patient with eating if necessary   - Allow adequate time for meals  - Recommend/ encourage appropriate diets, oral nutritional supplements, and vitamin/mineral supplements  - Order, calculate, and assess calorie counts as needed  - Recommend, monitor, and adjust tube feedings and TPN/PPN based on assessed needs  - Assess need for intravenous fluids  - Provide specific nutrition/hydration education as appropriate  - Include patient/family/caregiver in decisions related to nutrition  Outcome: Progressing     Problem: NEUROSENSORY - ADULT  Goal: Achieves stable or improved neurological status  Description: INTERVENTIONS  - Monitor and report changes in neurological status  - Monitor vital signs such as temperature, blood pressure, glucose, and any other labs ordered   - Initiate measures to prevent increased intracranial pressure  - Monitor for seizure activity and implement precautions if appropriate      Outcome: Progressing  Goal: Remains free of injury related to seizures activity  Description: INTERVENTIONS  - Maintain airway, patient safety  and administer oxygen as ordered  - Monitor patient for seizure activity, document and report duration and description of seizure to physician/advanced practitioner  - If seizure occurs,  ensure patient safety during seizure  - Reorient patient post seizure  - Seizure pads on all 4 side rails  - Instruct patient/family to notify RN of any seizure activity including if an aura is experienced  - Instruct patient/family to call for assistance with activity based on nursing assessment  - Administer anti-seizure medications if ordered    Outcome: Progressing

## 2023-04-01 NOTE — PROGRESS NOTES
[address] [place] [address]  Progress Note  Name: Larissa Bateman  MRN: [id number]  Unit/Bed#: E5 -01 I Date of Admission: 3/23/2023   Date of Service: 4/1/2023 I Hospital Day: 8    Assessment/Plan   * Small bowel obstruction [facility])  Assessment & Plan  Patient presented to the ED following multiple episodes of vomiting at home x one day  She reports two episodes of diarrhea at home, patient had multiple episodes of vomiting in the ED  · CT abdomen/pelvis showed mid to distal high-grade small bowel obstruction possibly due to adhesions  · Surgery consultation appreciated  · Surgery recommended NGT however attempts unsuccessful after which patient refused  · 3/27 started on clear liquid diet, had a bowel movement  · 3/28 morning patient complaining of no flatulence, no bowel movement, distension after eating  · 3/28 CT abdomen pelvis with p o  contrast showed ileus versus partial small bowel obstruction, partially decompressed distal small bowel loops without evidence of discrete transition point  · 3/29 Obstruction series showed contrast in right colon  · Advanced diet to low residue diet, tolerating well  · Has Bowel movement daily  · Encourage ambulation      Witnessed seizure-like activity ([facility] [address] )  Assessment & Plan  · Patient was supposed to be discharged today at 1:30 pm to rehab  She walked to the bathroom, sat on the toilet, stood up without telling the aide that she is going to stand up, her walker tripped she fell and hit her head on the wall  She did not lose her consciousness  I evaluated her in the bathroom, she was sitting on the toilet, vitals normal   No obvious signs of injury  Transportation arrived and they were standing in front of the door to pick her up to go to rehab  I explained to the patient that due to the fall and head strike we will have to do a CT head and we will push back her transportation for later this afternoon or tomorrow morning    Patient became very emotional, she became unresponsive with minimal generalized body shaking, lasted for about 20 seconds  After that she was postictal   No bowel or bladder incontinence  · Rapid response was called  · Stat CT head showed no acute intracranial abnormality  · CBC/BMP/mag/prolactin/lactic acid/CK ordered  · Called the daughter, explained the event, she voiced understanding    · Patient had a witnessed seizure-like episode while admitted to [facility] in May 2022  EEG at that time was negative  She is following with [facility] neurology  Ambulatory EEG was done this in March 2023, nonspecific findings, mild diffuse cerebral dysfunction which could be due to psychotropic medications, no epileptiform discharges in 47 hours    · Not on AED  · Seizure precautions  · Neuro consult     [address]  · Patient was watching herself at the scene, became dizzy, before falling down PCA who was next to her caught her and lowered down on her right side, denies hitting her head  · No obvious injury  · Vitals were stable after the event  · Right hip x-ray right knee x-ray without osseous abnormality    · Elysia Spain again today in the bathroom, hit her head on the wall, did not lose consciousness  · CT head negative for acute pathology    Memory loss  Assessment & Plan  · Patient not great historian   · Followed outpatient by neurology for this issue   · Scheduled to follow-up outpatient with neuropsych after ENT hearing evaluation completed    Anemia  Assessment & Plan  · All cell lines dropped, most likely dilutional  · Hemoglobin has been stable in the range 10/11  · Continue to monitor    Hypokalemia  Assessment & Plan  · Continue to replete as needed  · Magnesium 1 7, replete    Mixed hyperlipidemia  Assessment & Plan  · Continue statin    Tardive dyskinesia  Assessment & Plan  · Patient with chronic tardive dyskinesia secondary to psychiatric medications  · Continue 1 mg cogentin b i d   · Ingrezza 40 mg daily (nonformulary, family did not provide it)    Essential hypertension  Assessment & Plan  · Continue amlodipine     Bipolar II disorder ([facility] [address] )  Assessment & Plan  · Patient mood is stable with tardive dyskinesia at baseline  · Continue home medication regimen  · 50 mg seroquel t i d  · 300 mg lithium h s  · 30 mg paxil b i d  · 60 mg paxil h s   · 20 mg buspar t i d  · 40 mg ingrezza daily--nonformulary, not provided by family  · 7 5 mg remeron h s   · 50 mg hydroxyzine b i d prn      Chronic pain disorder  Assessment & Plan  · Continue 50 mg pregabalin b i d               VTE Pharmacologic Prophylaxis: VTE Score: 2 Lovenox    Patient Centered Rounds: I performed bedside rounds with nursing staff today  Discussions with Specialists or Other Care Team Provider: Nursing, rapid response team    Education and Discussions with Family / Patient: Updated  (daughter) via phone  Total Time Spent on Date of Encounter in care of patient: 55 minutes This time was spent on one or more of the following: performing physical exam; counseling and coordination of care; obtaining or reviewing history; documenting in the medical record; reviewing/ordering tests, medications or procedures; communicating with other healthcare professionals and discussing with patient's family/caregivers  Current Length of Stay: 8 day(s)  Current Patient Status: Inpatient   Certification Statement: The patient will continue to require additional inpatient hospital stay due to Seizure-like activity  Discharge Plan: Anticipate discharge in 24-48 hrs to rehab facility  Code Status: Level 1 - Full Code    Subjective:   Patient was seen and evaluated bedside this morning, she was doing well ate her breakfast, she was ready to go to rehab  Early afternoon in the bathroom patient stood up from the toilet, her walker tripped she fell and hit her head on the wall    Transportation was already there to pick her up, transport had to be postponed due to CAT scan, patient became very emotional, had a seizure-like activity  Rapid response was called  Objective:     Vitals:   Temp (24hrs), Av 7 °F (37 1 °C), Min:98 7 °F (37 1 °C), Max:98 8 °F (37 1 °C)    Temp:  [98 7 °F (37 1 °C)-98 8 °F (37 1 °C)] 98 8 °F (37 1 °C)  HR:  [] 90  Resp:  [16-18] 16  BP: (110-124)/(70-98) 110/72  SpO2:  [93 %-100 %] 97 %  Body mass index is 30 2 kg/m²  Input and Output Summary (last 24 hours): Intake/Output Summary (Last 24 hours) at 2023 1503  Last data filed at 3/31/2023 1658  Gross per 24 hour   Intake 240 ml   Output --   Net 240 ml       Physical Exam:   Physical Exam  Constitutional:       General: She is not in acute distress  HENT:      Head: Atraumatic  Cardiovascular:      Rate and Rhythm: Normal rate and regular rhythm  Heart sounds: No murmur heard  No friction rub  No gallop  Pulmonary:      Effort: Pulmonary effort is normal  No respiratory distress  Breath sounds: Normal breath sounds  No wheezing  Abdominal:      General: Bowel sounds are normal  There is no distension  Palpations: Abdomen is soft  Tenderness: There is no abdominal tenderness  Musculoskeletal:         General: No swelling  Cervical back: Neck supple  Skin:     General: Skin is warm and dry  Neurological:      General: No focal deficit present  Mental Status: She is alert     Psychiatric:         Mood and Affect: Mood normal           Additional Data:     Labs:  Results from last 7 days   Lab Units 23  1319 23  0443 23  0502 23  0502   WBC Thousand/uL 6 17 3 80*  --  3 70*   HEMOGLOBIN g/dL 13 9 10 3*  --  11 3*   HEMATOCRIT % 43 2 31 8*  --  34 6*   PLATELETS Thousands/uL 325 265  --  264   BANDS PCT %  --  1  --   --    NEUTROS PCT %  --   --   --  45   LYMPHS PCT %  --   --   --  38   LYMPHO PCT % 46* 42   < >  --    MONOS PCT %  --   --   --  11   MONO PCT % 5 10   < >  --    EOS PCT % 3 7* < > 5    < > = values in this interval not displayed  Results from last 7 days   Lab Units 04/01/23  1319 03/30/23  0443 03/29/23  0502   SODIUM mmol/L 139   < > 142   POTASSIUM mmol/L 3 9   < > 3 5   CHLORIDE mmol/L 104   < > 110*   CO2 mmol/L 24   < > 25   BUN mg/dL 9   < > 2*   CREATININE mg/dL 1 03   < > 0 62   ANION GAP mmol/L 11   < > 7   CALCIUM mg/dL 8 7   < > 8 1*   ALBUMIN g/dL  --   --  3 4*   TOTAL BILIRUBIN mg/dL  --   --  0 98   ALK PHOS U/L  --   --  51   ALT U/L  --   --  7   AST U/L  --   --  9*   GLUCOSE RANDOM mg/dL 104   < > 75    < > = values in this interval not displayed           Results from last 7 days   Lab Units 04/01/23  1304   POC GLUCOSE mg/dl 109         Results from last 7 days   Lab Units 04/01/23  1319   LACTIC ACID mmol/L 4 6*       Lines/Drains:  Invasive Devices     Peripheral Intravenous Line  Duration           Peripheral IV 04/01/23 Distal;Right;Upper;Ventral (anterior) Arm <1 day                  Telemetry:  Telemetry Orders (From admission, onward)             24 Hour Telemetry Monitoring  Continuous x 24 Hours (Telem)        References:    Telemetry Guidelines   Question:  Reason for 24 Hour Telemetry  Answer:  Metabolic/Electrolyte Disturbance with High Probability of Dysrhythmia (K level <3 or >6, or KCL infusion >10mEq/hr)                 Telemetry Reviewed: Normal Sinus Rhythm  Indication for Continued Telemetry Use: Metabolic/electrolyte disturbance with high probability of dysrhythmia             Imaging: Reviewed radiology reports from this admission including: CT head    Recent Cultures (last 7 days):         Last 24 Hours Medication List:   Current Facility-Administered Medications   Medication Dose Route Frequency Provider Last Rate   • acetaminophen  650 mg Oral Q6H PRN Es Woodward PA-C     • aluminum-magnesium hydroxide-simethicone  30 mL Oral Q6H PRN Es Woodward PA-C     • amLODIPine  10 mg Oral Daily Es Woodward PA-C     • aspirin  81 mg Oral Daily Carmen Chino PA-C     • atorvastatin  40 mg Oral After Young Ashraf PA-C     • benztropine  1 mg Oral BID Carmen Chino PA-C     • busPIRone  20 mg Oral TID Carmen Chino PA-C     • enoxaparin  40 mg Subcutaneous Q24H [address] Antonia Caba MD     • ferrous sulfate  325 mg Oral Daily With Breakfast Carmen Chino PA-C     • folic acid  6,534 mcg Oral Daily Carmen Chino PA-C     • HYDROmorphone  0 5 mg Intravenous Q4H PRN Antonia Caba MD     • hydrOXYzine HCL  50 mg Oral TID PRN Carmen Chino PA-C     • lithium carbonate  300 mg Oral HS Carmen Chino PA-C     • LORazepam  2 mg Intravenous Once PRN Mily Wallis MD     • LORazepam  0 5 mg Oral BID PRN Carmen Chino PA-C     • magnesium sulfate  2 g Intravenous Once Mily Wallis MD     • mirtazapine  7 5 mg Oral QPM Carmen Chino PA-C     • ondansetron  4 mg Intravenous Q6H PRN Carmen Chino PA-C     • oxyCODONE  5 mg Oral Q4H PRN Antonia Caba MD     • pantoprazole  40 mg Oral Daily Before Breakfast Mily Wallis MD     • PARoxetine  30 mg Oral BID Carmen Chino PA-C     • PARoxetine  60 mg Oral HS Carmen Chino PA-C     • prazosin  2 mg Oral HS Carmen Chino PA-C     • pregabalin  50 mg Oral BID Carmen Chino PA-C     • QUEtiapine  50 mg Oral TID Carmen Chino PA-C     • sodium chloride  500 mL Intravenous Once Mily Wallis MD     • Valbenazine Tosylate  40 mg Oral Daily Carmen Chino PA-C          Today, Patient Was Seen By: Mily Wallis MD    **Please Note: This note may have been constructed using a voice recognition system  **

## 2023-04-01 NOTE — ASSESSMENT & PLAN NOTE
· All cell lines dropped, most likely dilutional  · Hemoglobin has been stable in the range 10/11  · Continue to monitor

## 2023-04-01 NOTE — ASSESSMENT & PLAN NOTE
· Patient was watching herself at the scene, became dizzy, before falling down PCA who was next to her caught her and lowered down on her right side, denies hitting her head  · No obvious injury  · Vitals were stable after the event  · Right hip x-ray right knee x-ray without osseous abnormality    · Katja Sensor again today in the bathroom, hit her head on the wall, did not lose consciousness  · CT head negative for acute pathology

## 2023-04-01 NOTE — PLAN OF CARE
Problem: Potential for Falls  Goal: Patient will remain free of falls  Description: INTERVENTIONS:  - Educate patient/family on patient safety including physical limitations  - Instruct patient to call for assistance with activity   - Consult OT/PT to assist with strengthening/mobility   - Keep Call bell within reach  - Keep bed low and locked with side rails adjusted as appropriate  - Keep care items and personal belongings within reach  - Initiate and maintain comfort rounds  - Make Fall Risk Sign visible to staff  - Offer Toileting every 2 Hours, in advance of need  - Initiate/Maintain bed alarm  - Obtain necessary fall risk management equipment  - Apply yellow socks and bracelet for high fall risk patients  - Consider moving patient to room near nurses station  Outcome: Progressing     Problem: PAIN - ADULT  Goal: Verbalizes/displays adequate comfort level or baseline comfort level  Description: Interventions:  - Encourage patient to monitor pain and request assistance  - Assess pain using appropriate pain scale  - Administer analgesics based on type and severity of pain and evaluate response  - Implement non-pharmacological measures as appropriate and evaluate response  - Consider cultural and social influences on pain and pain management  - Notify physician/advanced practitioner if interventions unsuccessful or patient reports new pain  Outcome: Progressing     Problem: INFECTION - ADULT  Goal: Absence or prevention of progression during hospitalization  Description: INTERVENTIONS:  - Assess and monitor for signs and symptoms of infection  - Monitor lab/diagnostic results  - Monitor all insertion sites, i e  indwelling lines, tubes, and drains  - Monitor endotracheal if appropriate and nasal secretions for changes in amount and color  - [place] appropriate cooling/warming therapies per order  - Administer medications as ordered  - Instruct and encourage patient and family to use good hand hygiene technique  - Identify and instruct in appropriate isolation precautions for identified infection/condition  Outcome: Progressing     Problem: SAFETY ADULT  Goal: Patient will remain free of falls  Description: INTERVENTIONS:  - Educate patient/family on patient safety including physical limitations  - Instruct patient to call for assistance with activity   - Consult OT/PT to assist with strengthening/mobility   - Keep Call bell within reach  - Keep bed low and locked with side rails adjusted as appropriate  - Keep care items and personal belongings within reach  - Initiate and maintain comfort rounds  - Make Fall Risk Sign visible to staff  - Offer Toileting every 2 Hours, in advance of need  - Initiate/Maintain bed alarm  - Obtain necessary fall risk management equipment  - Apply yellow socks and bracelet for high fall risk patients  - Consider moving patient to room near nurses station  Outcome: Progressing  Goal: Maintain or return to baseline ADL function  Description: INTERVENTIONS:  -  Assess patient's ability to carry out ADLs; assess patient's baseline for ADL function and identify physical deficits which impact ability to perform ADLs (bathing, care of mouth/teeth, toileting, grooming, dressing, etc )  - Assess/evaluate cause of self-care deficits   - Assess range of motion  - Assess patient's mobility; develop plan if impaired  - Assess patient's need for assistive devices and provide as appropriate  - Encourage maximum independence but intervene and supervise when necessary  - Involve family in performance of ADLs  - Assess for home care needs following discharge   - Consider OT consult to assist with ADL evaluation and planning for discharge  - Provide patient education as appropriate  Outcome: Progressing  Goal: Maintains/Returns to pre admission functional level  Description: INTERVENTIONS:  - Perform BMAT or MOVE assessment daily    - Set and communicate daily mobility goal to care team and patient/family/caregiver  - Collaborate with rehabilitation services on mobility goals if consulted  - Perform Range of Motion 3 times a day  - Reposition patient every 2 hours  - Dangle patient 3 times a day  - Stand patient 3 times a day  - Ambulate patient 3 times a day  - Out of bed to chair 3 times a day   - Out of bed for meals 3 times a day  - Out of bed for toileting  - Record patient progress and toleration of activity level   Outcome: Progressing     Problem: DISCHARGE PLANNING  Goal: Discharge to home or other facility with appropriate resources  Description: INTERVENTIONS:  - Identify barriers to discharge w/patient and caregiver  - Arrange for needed discharge resources and transportation as appropriate  - Identify discharge learning needs (meds, wound care, etc )  - Arrange for interpretive services to assist at discharge as needed  - Refer to Case Management Department for coordinating discharge planning if the patient needs post-hospital services based on physician/advanced practitioner order or complex needs related to functional status, cognitive ability, or social support system  Outcome: Progressing     Problem: Knowledge Deficit  Goal: Patient/family/caregiver demonstrates understanding of disease process, treatment plan, medications, and discharge instructions  Description: Complete learning assessment and assess knowledge base    Interventions:  - Provide teaching at level of understanding  - Provide teaching via preferred learning methods  Outcome: Progressing     Problem: MOBILITY - ADULT  Goal: Maintain or return to baseline ADL function  Description: INTERVENTIONS:  -  Assess patient's ability to carry out ADLs; assess patient's baseline for ADL function and identify physical deficits which impact ability to perform ADLs (bathing, care of mouth/teeth, toileting, grooming, dressing, etc )  - Assess/evaluate cause of self-care deficits   - Assess range of motion  - Assess patient's mobility; develop plan if impaired  - Assess patient's need for assistive devices and provide as appropriate  - Encourage maximum independence but intervene and supervise when necessary  - Involve family in performance of ADLs  - Assess for home care needs following discharge   - Consider OT consult to assist with ADL evaluation and planning for discharge  - Provide patient education as appropriate  Outcome: Progressing  Goal: Maintains/Returns to pre admission functional level  Description: INTERVENTIONS:  - Perform BMAT or MOVE assessment daily    - Set and communicate daily mobility goal to care team and patient/family/caregiver  - Collaborate with rehabilitation services on mobility goals if consulted  - Perform Range of Motion 3 times a day  - Reposition patient every 2 hours    - Dangle patient 3 times a day  - Stand patient 3 times a day  - Ambulate patient 3 times a day  - Out of bed to chair 3 times a day   - Out of bed for meals 3 times a day  - Out of bed for toileting  - Record patient progress and toleration of activity level   Outcome: Progressing

## 2023-04-02 LAB
ANION GAP SERPL CALCULATED.3IONS-SCNC: 7 MMOL/L (ref 4–13)
ATRIAL RATE: 101 BPM
ATRIAL RATE: 102 BPM
ATRIAL RATE: 102 BPM
BASOPHILS # BLD AUTO: 0.03 THOUSANDS/ÂΜL (ref 0–0.1)
BASOPHILS NFR BLD AUTO: 1 % (ref 0–1)
BUN SERPL-MCNC: 7 MG/DL (ref 5–25)
CALCIUM SERPL-MCNC: 8.5 MG/DL (ref 8.4–10.2)
CHLORIDE SERPL-SCNC: 109 MMOL/L (ref 96–108)
CO2 SERPL-SCNC: 24 MMOL/L (ref 21–32)
CREAT SERPL-MCNC: 0.72 MG/DL (ref 0.6–1.3)
EOSINOPHIL # BLD AUTO: 0.2 THOUSAND/ÂΜL (ref 0–0.61)
EOSINOPHIL NFR BLD AUTO: 4 % (ref 0–6)
ERYTHROCYTE [DISTWIDTH] IN BLOOD BY AUTOMATED COUNT: 14.6 % (ref 11.6–15.1)
GFR SERPL CREATININE-BSD FRML MDRD: 91 ML/MIN/1.73SQ M
GLUCOSE SERPL-MCNC: 76 MG/DL (ref 65–140)
HCT VFR BLD AUTO: 38.8 % (ref 34.8–46.1)
HGB BLD-MCNC: 12.2 G/DL (ref 11.5–15.4)
IMM GRANULOCYTES # BLD AUTO: 0.04 THOUSAND/UL (ref 0–0.2)
IMM GRANULOCYTES NFR BLD AUTO: 1 % (ref 0–2)
LYMPHOCYTES # BLD AUTO: 1.56 THOUSANDS/ÂΜL (ref 0.6–4.47)
LYMPHOCYTES NFR BLD AUTO: 34 % (ref 14–44)
MAGNESIUM SERPL-MCNC: 2.5 MG/DL (ref 1.9–2.7)
MCH RBC QN AUTO: 29.9 PG (ref 26.8–34.3)
MCHC RBC AUTO-ENTMCNC: 31.4 G/DL (ref 31.4–37.4)
MCV RBC AUTO: 95 FL (ref 82–98)
MONOCYTES # BLD AUTO: 0.33 THOUSAND/ÂΜL (ref 0.17–1.22)
MONOCYTES NFR BLD AUTO: 7 % (ref 4–12)
NEUTROPHILS # BLD AUTO: 2.44 THOUSANDS/ÂΜL (ref 1.85–7.62)
NEUTS SEG NFR BLD AUTO: 53 % (ref 43–75)
NRBC BLD AUTO-RTO: 0 /100 WBCS
P AXIS: 51 DEGREES
P AXIS: 57 DEGREES
P AXIS: 58 DEGREES
PLATELET # BLD AUTO: 304 THOUSANDS/UL (ref 149–390)
PMV BLD AUTO: 9.1 FL (ref 8.9–12.7)
POTASSIUM SERPL-SCNC: 4 MMOL/L (ref 3.5–5.3)
PR INTERVAL: 134 MS
PR INTERVAL: 134 MS
PR INTERVAL: 136 MS
PROLACTIN SERPL-MCNC: 60.8 NG/ML
QRS AXIS: 74 DEGREES
QRS AXIS: 81 DEGREES
QRS AXIS: 92 DEGREES
QRSD INTERVAL: 94 MS
QRSD INTERVAL: 96 MS
QRSD INTERVAL: 98 MS
QT INTERVAL: 342 MS
QT INTERVAL: 346 MS
QT INTERVAL: 350 MS
QTC INTERVAL: 445 MS
QTC INTERVAL: 448 MS
QTC INTERVAL: 456 MS
RBC # BLD AUTO: 4.08 MILLION/UL (ref 3.81–5.12)
SODIUM SERPL-SCNC: 140 MMOL/L (ref 135–147)
T WAVE AXIS: -10 DEGREES
T WAVE AXIS: -8 DEGREES
T WAVE AXIS: 7 DEGREES
VENTRICULAR RATE: 101 BPM
VENTRICULAR RATE: 102 BPM
VENTRICULAR RATE: 102 BPM
WBC # BLD AUTO: 4.6 THOUSAND/UL (ref 4.31–10.16)

## 2023-04-02 RX ADMIN — PAROXETINE 60 MG: 20 TABLET, FILM COATED ORAL at 21:14

## 2023-04-02 RX ADMIN — ATORVASTATIN CALCIUM 40 MG: 40 TABLET, FILM COATED ORAL at 17:14

## 2023-04-02 RX ADMIN — ENOXAPARIN SODIUM 40 MG: 100 INJECTION SUBCUTANEOUS at 08:38

## 2023-04-02 RX ADMIN — QUETIAPINE FUMARATE 50 MG: 25 TABLET ORAL at 15:39

## 2023-04-02 RX ADMIN — BENZTROPINE MESYLATE 1 MG: 1 TABLET ORAL at 17:14

## 2023-04-02 RX ADMIN — MIRTAZAPINE 7.5 MG: 15 TABLET, FILM COATED ORAL at 17:14

## 2023-04-02 RX ADMIN — LITHIUM CARBONATE 300 MG: 300 TABLET, FILM COATED, EXTENDED RELEASE ORAL at 21:17

## 2023-04-02 RX ADMIN — PREGABALIN 50 MG: 50 CAPSULE ORAL at 08:39

## 2023-04-02 RX ADMIN — OXYCODONE 5 MG: 5 TABLET ORAL at 08:51

## 2023-04-02 RX ADMIN — BUSPIRONE HYDROCHLORIDE 20 MG: 10 TABLET ORAL at 08:39

## 2023-04-02 RX ADMIN — LEVETIRACETAM 1000 MG: 100 INJECTION, SOLUTION INTRAVENOUS at 08:45

## 2023-04-02 RX ADMIN — PAROXETINE 30 MG: 20 TABLET, FILM COATED ORAL at 17:14

## 2023-04-02 RX ADMIN — PAROXETINE 30 MG: 20 TABLET, FILM COATED ORAL at 08:38

## 2023-04-02 RX ADMIN — BENZTROPINE MESYLATE 1 MG: 1 TABLET ORAL at 08:38

## 2023-04-02 RX ADMIN — AMLODIPINE BESYLATE 10 MG: 10 TABLET ORAL at 08:39

## 2023-04-02 RX ADMIN — BUSPIRONE HYDROCHLORIDE 20 MG: 10 TABLET ORAL at 21:17

## 2023-04-02 RX ADMIN — BUSPIRONE HYDROCHLORIDE 20 MG: 10 TABLET ORAL at 15:39

## 2023-04-02 RX ADMIN — OXYCODONE 5 MG: 5 TABLET ORAL at 21:16

## 2023-04-02 RX ADMIN — PRAZOSIN HYDROCHLORIDE 2 MG: 2 CAPSULE ORAL at 21:17

## 2023-04-02 RX ADMIN — PREGABALIN 50 MG: 50 CAPSULE ORAL at 17:14

## 2023-04-02 RX ADMIN — QUETIAPINE FUMARATE 50 MG: 25 TABLET ORAL at 08:38

## 2023-04-02 RX ADMIN — FERROUS SULFATE TAB 325 MG (65 MG ELEMENTAL FE) 325 MG: 325 (65 FE) TAB at 08:39

## 2023-04-02 RX ADMIN — FOLIC ACID 1000 MCG: 1 TABLET ORAL at 08:38

## 2023-04-02 RX ADMIN — ASPIRIN 81 MG: 81 TABLET, COATED ORAL at 08:38

## 2023-04-02 RX ADMIN — QUETIAPINE FUMARATE 50 MG: 25 TABLET ORAL at 21:17

## 2023-04-02 NOTE — ASSESSMENT & PLAN NOTE
· Reviewing chart patient has a history of falls  · Earlier this week patient tripped and fell in the bathroom while washing up     · X ray of the right hip and knee negative  · Alberteen Media and hit her head on Friday  · CT no acute abnormality  · Fall precautions

## 2023-04-02 NOTE — ASSESSMENT & PLAN NOTE
· Patient was supposed to be discharged on saturday at 1:30 pm to rehab  Around 1 pm, she walked to the bathroom, sat on the toilet, stood up without telling the aide that she is going to stand up, her walker tripped, she fell and hit her head on the shower wall  She did not lose consciousness  I evaluated her in the bathroom, she was sitting on the toilet, vitals normal   No obvious signs of injury  Transportation arrived and they were standing in front of the door to pick her up to go to rehab  I explained to the patient that due to the fall and head strike we will have to do a CT head and we will push back her transportation for later that afternoon or tomorrow morning  Patient became very emotional, agitated, had trouble forming words, started hitting her mouth and she became unresponsive with minimal generalized body shaking, lasted for about 20 seconds  After that she seemed to be postictal   No bowel or bladder incontinence  Rapid response was called  Daughter notified, voiced understanding  · Upon chart review: Patient had a witnessed seizure-like episode while admitted to [facility] in May 2022  EEG at that time was negative  She is following with [facility] neurology  Ambulatory EEG was done in March 2023, nonspecific findings, mild diffuse cerebral dysfunction which could be due to psychotropic medications, no epileptiform discharges in 47 hours  Not on AED  Per daughter she can get very emotional when things does not go as planned         · Stat CT head showed no acute intracranial abnormality  · Lactic acid initially elevated, cleared with IV fluids  · Elevated prolactin, which could be elevated in stress  · Started on Keppra 1000 mg twice daily  · Seizure precautions  · Evaluated by neurology today, routine EEG tomorrow  · No need for AED, suspect pseudoseizure, discontinue Keppra

## 2023-04-02 NOTE — PLAN OF CARE
Problem: Potential for Falls  Goal: Patient will remain free of falls  Description: INTERVENTIONS:  - Educate patient/family on patient safety including physical limitations  - Instruct patient to call for assistance with activity   - Consult OT/PT to assist with strengthening/mobility   - Keep Call bell within reach  - Keep bed low and locked with side rails adjusted as appropriate  - Keep care items and personal belongings within reach  - Initiate and maintain comfort rounds  - Make Fall Risk Sign visible to staff  - Offer Toileting every 2 Hours, in advance of need  - Initiate/Maintain bed alarm  - Obtain necessary fall risk management equipment  - Apply yellow socks and bracelet for high fall risk patients  - Consider moving patient to room near nurses station  Outcome: Progressing     Problem: PAIN - ADULT  Goal: Verbalizes/displays adequate comfort level or baseline comfort level  Description: Interventions:  - Encourage patient to monitor pain and request assistance  - Assess pain using appropriate pain scale  - Administer analgesics based on type and severity of pain and evaluate response  - Implement non-pharmacological measures as appropriate and evaluate response  - Consider cultural and social influences on pain and pain management  - Notify physician/advanced practitioner if interventions unsuccessful or patient reports new pain  Outcome: Progressing     Problem: INFECTION - ADULT  Goal: Absence or prevention of progression during hospitalization  Description: INTERVENTIONS:  - Assess and monitor for signs and symptoms of infection  - Monitor lab/diagnostic results  - Monitor all insertion sites, i e  indwelling lines, tubes, and drains  - Monitor endotracheal if appropriate and nasal secretions for changes in amount and color  - [place] appropriate cooling/warming therapies per order  - Administer medications as ordered  - Instruct and encourage patient and family to use good hand hygiene technique  - Identify and instruct in appropriate isolation precautions for identified infection/condition  Outcome: Progressing     Problem: SAFETY ADULT  Goal: Patient will remain free of falls  Description: INTERVENTIONS:  - Educate patient/family on patient safety including physical limitations  - Instruct patient to call for assistance with activity   - Consult OT/PT to assist with strengthening/mobility   - Keep Call bell within reach  - Keep bed low and locked with side rails adjusted as appropriate  - Keep care items and personal belongings within reach  - Initiate and maintain comfort rounds  - Make Fall Risk Sign visible to staff  - Offer Toileting every 2 Hours, in advance of need  - Initiate/Maintain bed alarm  - Obtain necessary fall risk management equipment  - Apply yellow socks and bracelet for high fall risk patients  - Consider moving patient to room near nurses station  Outcome: Progressing  Goal: Maintain or return to baseline ADL function  Description: INTERVENTIONS:  -  Assess patient's ability to carry out ADLs; assess patient's baseline for ADL function and identify physical deficits which impact ability to perform ADLs (bathing, care of mouth/teeth, toileting, grooming, dressing, etc )  - Assess/evaluate cause of self-care deficits   - Assess range of motion  - Assess patient's mobility; develop plan if impaired  - Assess patient's need for assistive devices and provide as appropriate  - Encourage maximum independence but intervene and supervise when necessary  - Involve family in performance of ADLs  - Assess for home care needs following discharge   - Consider OT consult to assist with ADL evaluation and planning for discharge  - Provide patient education as appropriate  Outcome: Progressing  Goal: Maintains/Returns to pre admission functional level  Description: INTERVENTIONS:  - Perform BMAT or MOVE assessment daily    - Set and communicate daily mobility goal to care team and patient/family/caregiver  - Collaborate with rehabilitation services on mobility goals if consulted  - Perform Range of Motion 3times a day  - Reposition patient every 2 hours  - Dangle patient 3 times a day  - Stand patient 3 times a day  - Ambulate patient 3 times a day  - Out of bed to chair 3 times a day   - Out of bed for meals 3 times a day  - Out of bed for toileting  - Record patient progress and toleration of activity level   Outcome: Progressing     Problem: DISCHARGE PLANNING  Goal: Discharge to home or other facility with appropriate resources  Description: INTERVENTIONS:  - Identify barriers to discharge w/patient and caregiver  - Arrange for needed discharge resources and transportation as appropriate  - Identify discharge learning needs (meds, wound care, etc )  - Arrange for interpretive services to assist at discharge as needed  - Refer to Case Management Department for coordinating discharge planning if the patient needs post-hospital services based on physician/advanced practitioner order or complex needs related to functional status, cognitive ability, or social support system  Outcome: Progressing     Problem: Knowledge Deficit  Goal: Patient/family/caregiver demonstrates understanding of disease process, treatment plan, medications, and discharge instructions  Description: Complete learning assessment and assess knowledge base    Interventions:  - Provide teaching at level of understanding  - Provide teaching via preferred learning methods  Outcome: Progressing     Problem: MOBILITY - ADULT  Goal: Maintain or return to baseline ADL function  Description: INTERVENTIONS:  -  Assess patient's ability to carry out ADLs; assess patient's baseline for ADL function and identify physical deficits which impact ability to perform ADLs (bathing, care of mouth/teeth, toileting, grooming, dressing, etc )  - Assess/evaluate cause of self-care deficits   - Assess range of motion  - Assess patient's mobility; develop plan if impaired  - Assess patient's need for assistive devices and provide as appropriate  - Encourage maximum independence but intervene and supervise when necessary  - Involve family in performance of ADLs  - Assess for home care needs following discharge   - Consider OT consult to assist with ADL evaluation and planning for discharge  - Provide patient education as appropriate  Outcome: Progressing  Goal: Maintains/Returns to pre admission functional level  Description: INTERVENTIONS:  - Perform BMAT or MOVE assessment daily    - Set and communicate daily mobility goal to care team and patient/family/caregiver  - Collaborate with rehabilitation services on mobility goals if consulted  - Perform Range of Motion 3 times a day  - Reposition patient every 2 hours  - Dangle patient 3 times a day  - Stand patient 3 times a day  - Ambulate patient 3 times a day  - Out of bed to chair 3 times a day   - Out of bed for meals 3 times a day  - Out of bed for toileting  - Record patient progress and toleration of activity level   Outcome: Progressing     Problem: Nutrition/Hydration-ADULT  Goal: Nutrient/Hydration intake appropriate for improving, restoring or maintaining nutritional needs  Description: Monitor and assess patient's nutrition/hydration status for malnutrition  Collaborate with interdisciplinary team and initiate plan and interventions as ordered  Monitor patient's weight and dietary intake as ordered or per policy  Utilize nutrition screening tool and intervene as necessary  Determine patient's food preferences and provide high-protein, high-caloric foods as appropriate       INTERVENTIONS:  - Monitor oral intake, urinary output, labs, and treatment plans  - Assess nutrition and hydration status and recommend course of action  - Evaluate amount of meals eaten  - Assist patient with eating if necessary   - Allow adequate time for meals  - Recommend/ encourage appropriate diets, oral nutritional supplements, and vitamin/mineral supplements  - Order, calculate, and assess calorie counts as needed  - Recommend, monitor, and adjust tube feedings and TPN/PPN based on assessed needs  - Assess need for intravenous fluids  - Provide specific nutrition/hydration education as appropriate  - Include patient/family/caregiver in decisions related to nutrition  Outcome: Progressing     Problem: NEUROSENSORY - ADULT  Goal: Achieves stable or improved neurological status  Description: INTERVENTIONS  - Monitor and report changes in neurological status  - Monitor vital signs such as temperature, blood pressure, glucose, and any other labs ordered   - Initiate measures to prevent increased intracranial pressure  - Monitor for seizure activity and implement precautions if appropriate      Outcome: Progressing  Goal: Remains free of injury related to seizures activity  Description: INTERVENTIONS  - Maintain airway, patient safety  and administer oxygen as ordered  - Monitor patient for seizure activity, document and report duration and description of seizure to physician/advanced practitioner  - If seizure occurs,  ensure patient safety during seizure  - Reorient patient post seizure  - Seizure pads on all 4 side rails  - Instruct patient/family to notify RN of any seizure activity including if an aura is experienced  - Instruct patient/family to call for assistance with activity based on nursing assessment  - Administer anti-seizure medications if ordered    Outcome: Progressing

## 2023-04-02 NOTE — ASSESSMENT & PLAN NOTE
This patient has a significant tardive dyskinesia  She is followed both through our office as well as psychiatry  She is currently on ingrezza is  40mg/day to help with management

## 2023-04-02 NOTE — CONSULTS
"[facility]  Neurology consult Name: Louis Leija   MRN: [id number] Unit/Bed#: E5 -01 Date of Admission: 3/23/2023   Date of Service: 4/2/2023  Hospital Day: 9    Inpatient consult to Neurology  Consult performed by: MERCY Gonzalez  Consult ordered by: Willis Hodgson MD      Reason for Consult / Principal Problem: Seizure-like activity  Hx and PE limited by: None  Review of previous medical records was completed  Assessment/Plan   Witnessed seizure-like activity [facility])  Assessment & Plan  Neurology is asked to see this 61-year-old right-hand-dominant woman who is known to our outpatient office for tardive dyskinesia, and memory loss particularly  This patient has been in this hospital, now day 10 for a small bowel obstruction which was managed conservatively  Yesterday on day 9, April 1, 2023 the patient was tentatively clear for discharged today to rehab  Notes indicate that she apparently was using the bathroom prior to transfer when she had apparently finished with using the toilet and may have actually been at the sink washing her hands and she was appropriately using her walker but she may have caught her walker and tripped she fell and hit her head on the wall  She did not lose her consciousness  She had a rapid response called and those notes were reviewed  There is a detailed note from her nurse at that time, I refer the reader to the note from 1:45 PM   Reportedly the patient was able to still ambulate back to the bed, the notes about her coming \"increasingly agitated and began having difficulty forming her words\" would be consistent with anxiety coupled with her memory disorder and her rather severe tardive dyskinesia  That note in particular goes on to report that the patient was \"grabbing at her mouth while trying to speak\" then followed by a period of unresponsiveness and being limp    These reports in those noted during the rapid response note " appeared to be more consistent with that of a nonepileptic seizure  This patient has had both routine as well as ambulatory EEGs previously, 21 and 22 and she did not have any epileptic features on either of these tracings even though she also had behavioral events on the EEG in 2021 that appeared to be similar to the events that she had yesterday  However, because she was reported to be unresponsive we will get a routine EEG on her tomorrow  Her exam today appears to be consistent with her exam noted by nonneurologic providers over the last several days as well as consistent with her exam our neurology provider last saw her in the office March 9 of this year  At this time we would not favor starting her on any antiepileptics  Tardive dyskinesia  Assessment & Plan  This patient has a significant tardive dyskinesia  She is followed both through our office as well as psychiatry  She is currently on ingrezza is  40mg/day to help with management  We will try and get this patient a hospital follow-up appointment sooner than her previously scheduled appointment as noted:  7/10/2023 8:00 AM [facility]       HPI: Samantha Small is a right handed  59 y o  female who is knowon to our Neuro offices for memory loss, & tardive dyskinesia  She has a psych history of bipolar disease which is a longstanding that I am able to ascertain and she has been on many psychiatric meds apparently for some years and reviewing her chart  She was admitted to [facility] 10 days ago for abdominal pain and was noted to have a small bowel obstruction that was managed conservatively  Over the last 8 to 9 days she is progressed and has been feeling better as I review the chart  She was apparently deemed stable for rehab yesterday on day 9 and was apparently set to be transferred to rehab    She was apparently in the bathroom and depending on the notes that are available she either had difficulty standing up and walking after she used the toilet or when she was washing her hands at the sink that she became lightheaded and fell  She apparently struck her head  One of the nursing staff was nearby and they were able to assist her and she was able to get up and ambulate back to the bed  Again the notes are unclear in terms of the progression however it seems that the patient's started to become agitated, and I question if this was anxiety and because of her tardive dyskinesia, and her bipolar disease she is labeled as being agitated and not anxious  She was told that she was being taken down for a CAT scan after the rapid response  Apparently according to another note she then had difficulty getting her words out and her tongue movements and facial expressions apparently became more accelerated or aggravated  She then apparently became unresponsive  It is noted then then she did become responsive after this a few minutes later  The patient reports that she does not remember that she just remembers what people told her from yesterday  She tells this examiner she did not know that she was supposed to go to rehab yesterday and that she may have forgotten that  ROS: 12 system cued query: She reports that she has some memory problems and difficulties all the time anymore  She has no complaints of headache or visual disturbances at this time  No chest pain no shortness of breath  She does have a lower back and leg pain that she reports to this examiner which she reports comes and goes  She denies any abdominal or belly pain at this time  Historical Information     Past Medical History:   Diagnosis Date   • Anxiety    • Arthritis     left knee   • At risk for falls    • Bipolar 2 disorder ([facility] [address] )     FOLLOWS WITH PSYCHIATRIST   CONTINUE LAMOTRIGINE; RESOLVED: [id number]   • Depression    • Familial tremor     both hands   • Fibromyalgia     LAST ASSESSED: [id number]   • GERD (gastroesophageal reflux disease)    • Hearing aid worn     left ear   • [place] (hard of hearing)     left ear   • Hyperlipidemia    • Hypertension    • Left-sided weakness    • Memory loss of unknown cause     long and short term   • Migraine    • Obesity    • Obesity, Class II, BMI 35-39 9    • Overactive bladder    • Panic attack    • Post traumatic stress disorder    • S/P insertion of spinal cord stimulator     no remote   • Seasonal allergies    • Seizures (HCC)     possible seizure like activity   • Stroke [facility])     questionable stroke 2009   • Tardive dyskinesia     PATIENT STATES   • 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    CONT ASA 81 MG DAILY; RESOLVED: [id number]   • Urinary incontinence    • Uses walker    • Wears dentures     partial lower / full upper- doesnt wear   • Wears glasses      Past Surgical History:   Procedure Laterality Date   • BACK SURGERY     • [address]   • COLONOSCOPY      RESOLVED: [id number]   • EAR SURGERY     • EGD     • HYSTERECTOMY  2004   • MYRINGOTOMY W/ TUBES Left    • NECK SURGERY  04/2019   • UT ARTHRP KNE CONDYLE&PLATU MEDIAL&LAT COMPARTMENTS Right 3/9/2022    Procedure: ARTHROPLASTY KNEE TOTAL;  Surgeon: Ty Koehler DO;  [facility];  Service: Orthopedics   • UT ARTHRP KNE CONDYLE&PLATU MEDIAL&LAT COMPARTMENTS Left 7/5/2022    Procedure: ARTHROPLASTY KNEE TOTAL;  Surgeon: Ty Koehler DO;  [facility];  Service: Orthopedics   • UT CYSTOURETHROSCOPY N/A 2/18/2016    Procedure: CYSTOSCOPY, BOTOX INJECTION;  Surgeon: Yisel Lockett MD;  [facility];  Service: Gynecology   • UT INSJ/RPLCMT SPI NPGR DIR/INDUXIVE COUPLING Right 2/10/2021    Procedure: REPLACEMENT IMPLANTABLE PULSE GENERATOR DORSAL SPINAL COLUMN STIMULATOR, RIGHT;  Surgeon: Radha Callahan MD;  [facility];  Service: Neurosurgery   • UT PRQ IMPLTJ NSTIM ELECTRODE ARRAY EPIDURAL Right 7/28/2020    Procedure: INSERTION THORACIC DORSAL COLUMN SPINAL CORD STIMULATOR PERCUTANEOUS W IMPLANTABLE PULSE GENERATOR, RIGHT;  Surgeon: Salima Valdes MD;  Location:  MAIN OR;  Service: Neurosurgery   • TONSILLECTOMY     • TUBAL LIGATION  1986   • UPPER GASTROINTESTINAL ENDOSCOPY  09/2020       Social History : The patient is apparently single or   She lives with her family  Family History:   Family History   Problem Relation Age of Onset   • Colon cancer Mother    • Alzheimer's disease Father    • Stroke Father    • Colon cancer Brother    • Bipolar disorder Brother    • Breast cancer Maternal Aunt    • Colon cancer Maternal Aunt    • Heart disease Other    • Diabetes Other    • Hypertension Other    • Seizures Son    • Depression Paternal Grandfather    • No Known Problems Sister    • No Known Problems Brother    • Thyroid disease Neg Hx          No Known Allergies  Meds:all current active meds have been reviewed and I note that she is not on any meds for her tardive dyskinesia as her Ingrezza/valbenazine is not on formulary at our institution      Scheduled Meds:  Medication Dose Route Frequency   • acetaminophen  650 mg Oral Q6H PRN   • aluminum-magnesium hydroxide-simethicone  30 mL Oral Q6H PRN   • amLODIPine  10 mg Oral Daily   • aspirin  81 mg Oral Daily   • atorvastatin  40 mg Oral After Dinner   • benztropine  1 mg Oral BID   • busPIRone  20 mg Oral TID   • enoxaparin  40 mg Subcutaneous Q24H KESHIA   • ferrous sulfate  325 mg Oral Daily With Breakfast   • folic acid  7,851 mcg Oral Daily   • HYDROmorphone  0 5 mg Intravenous Q4H PRN   • hydrOXYzine HCL  50 mg Oral TID PRN   • levETIRAcetam  1,000 mg Intravenous Q12H KESHIA   • lithium carbonate  300 mg Oral HS   • LORazepam  2 mg Intravenous Once PRN   • LORazepam  0 5 mg Oral BID PRN   • mirtazapine  7 5 mg Oral QPM   • ondansetron  4 mg Intravenous Q6H PRN   • oxyCODONE  5 mg Oral Q4H PRN   • pantoprazole  40 mg Oral Daily Before Breakfast   • PARoxetine  30 mg Oral BID   • PARoxetine  60 mg Oral HS "  • prazosin  2 mg Oral HS   • pregabalin  50 mg Oral BID   • QUEtiapine  50 mg Oral TID   • Valbenazine Tosylate  40 mg Oral Daily     PRN Meds: •  acetaminophen  •  aluminum-magnesium hydroxide-simethicone  •  HYDROmorphone  •  hydrOXYzine HCL  •  LORazepam  •  LORazepam  •  ondansetron  •  oxyCODONE      Physical Exam:   Objective   Vitals:Blood pressure 122/78, pulse 85, temperature 97 8 °F (36 6 °C), temperature source Oral, resp  rate 16, height 5' 1\" (1 549 m), weight 72 5 kg (159 lb 13 3 oz), SpO2 97 %, not currently breastfeeding  ,Body mass index is 30 2 kg/m²  Patient was examined in bed there was no family present  General: She was sleeping she woke to name and gentle tactile stimulation  She appears older than stated age   Head: Normocephalic, without obvious abnormality, atraumatic  Oral exam: lips, mucosa, and tongue moist; her tongue is somewhat hypertrophied  She has near constant and rather severe tardive dyskinetic movements appreciated  Neck: no carotid bruit, her tardive dyskinetic movements also somewhat involve her neck as well  Lungs: clear to auscultation ant  bilaterally  Heart: regular rate and rhythm, S1, S2 normal, no murmur appreciated,   Abdomen: Slightly distended, +BS, nontender  Extremities: atraumatic, no cyanosis or edema    Neurologic:   Mental status: Once the patient was awake she was attentive throughout the exam   She was grossly oriented, although she reported that she thought it was still March 31  She was able to retain April 2, 2023 after she was reoriented  The patient has been in the hospital now for approximately 10 days or so  She reports she has a little recollection of yesterday but she freely admits that her memory is bad and she ordinarily may not remember the days anyway  She reports that she recalls she thinks what people told her about yesterday    CN Exam: KARLEY INGRAM'sylvester MARTINEZ, grossly intact visual acuity and fields, Gaze conjugate No sensory or motor " lateralizations (No PP on face), Hearing I B, CNIX-XII I B, she had near continuous jaw tongue lower facial movements that were oftentimes severe but not rapid of her lower face  Her speech was markedly impaired and somewhat dysarthric related to her tardive dyskinesia  Motor: Symmetrical power, she needed coaching for best effort and good resistance x 4 limbs she had complaints of pain in her bilateral lower extremities with resistance movers  Sensory: Grossly intact  X 4 limbs,  mod inc lt, temp, vib, (not PP tested on today's exam)  Cerebellar: no no gross cerebellar dysfunction on simple upper extremity maneuvers, heel-to-shin was not tested given the patient's complaints of back and lower leg pain  DTR's: Age appropriate, WNL; Plantars: Mute bilaterally  Gait: I did not gaited her on today's exam       Lab Results:   I have personally reviewed pertinent reports  , CBC:   Results from last 7 days   Lab Units 04/02/23  0520 04/01/23  1319 03/30/23  0443   WBC Thousand/uL 4 60 6 17 3 80*   RBC Million/uL 4 08 4 70 3 46*   HEMOGLOBIN g/dL 12 2 13 9 10 3*   HEMATOCRIT % 38 8 43 2 31 8*   MCV fL 95 92 92   PLATELETS Thousands/uL 304 325 265   , BMP/CMP:   Results from last 7 days   Lab Units 04/02/23  0520 04/01/23  1319 04/01/23  0520 03/31/23  0533 03/30/23  0443 03/29/23  0502   SODIUM mmol/L 140 139  --  141   < > 142   POTASSIUM mmol/L 4 0 3 9 3 6 3 4*   < > 3 5   CHLORIDE mmol/L 109* 104  --  106   < > 110*   CO2 mmol/L 24 24  --  27   < > 25   BUN mg/dL 7 9  --  6   < > 2*   CREATININE mg/dL 0 72 1 03  --  0 82   < > 0 62   CALCIUM mg/dL 8 5 8 7  --  8 0*   < > 8 1*   AST U/L  --   --   --   --   --  9*   ALT U/L  --   --   --   --   --  7   ALK PHOS U/L  --   --   --   --   --  51   EGFR ml/min/1 73sq m 91 59  --  78   < > 99    < > = values in this interval not displayed     , Vitamin B12:   , HgBA1C:   , TSH:   , Coagulation:   , Lipid Profile:        Imaging Studies: I have personally reviewed pertinent films in PACS and I have personally reviewed her CT scan done yesterday at the time of admission as well as her last MRI done in 2014 here  I saw no acute pathology on her last CT scan done here  In regards to this patient last had MRI brain imaging in 2014  These films were compared and they were deemed to be stable when compared to films from 2012  Specifically she was not noted to have any volume loss changes consistent with a dementia at that time  Specifically: 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,    Neuro Quant imaging demonstrates no evidence for medial temporal lobe volume loss  Volume of the hippocampus is greater than mean, volume of the inferior lateral ventricles is less than the mean for age-matched controls  EEG: This patient has had a routine EEG study done in June 2021 as well as a 47-hour ambulatory EEG done in August 2022  On the routine study of June 2021 she actually had episodes of the word finding difficulties the mouth movements and other behavioral appearing movements and episodes during the routine EEG that was captured  These events did not have any EEG correlate  On her 47-hour ambulatory EEG done in August of last year there were no events and it was slow with thought disorganization but there were no epileptic features seen at all  She had recently had an order placed to have another ambulatory EEG however given these to prior studies and that we will try and get a routine EEG now for the event that she had I suspect she does not have a true epilepsy disorder  Dictation voice to text software has been used in the creation of this document  Please consider this in light of any contextual or grammatical errors

## 2023-04-02 NOTE — ASSESSMENT & PLAN NOTE
· Patient mood is stable with tardive dyskinesia at baseline  · Continue home medication regimen  · 50 mg seroquel t i d  · 300 mg lithium h s  · 30 mg paxil b i d  · 60 mg paxil h s   · 20 mg buspar t i d  · 40 mg ingrezza daily--nonformulary, not provided by family  · 7 5 mg remeron h s   · 50 mg hydroxyzine b i d prn  · Ifeanyi Martines is nonformulary, family brought all her medications in on Friday but the Ingrezza bottle was empty  Daughter called [facility] specialty pharmacy [phone] and they need a new prescription  Daughter called her psychiatrist and apparently they said who ever is taking care of her now inpatient should call in the medication  Pharmacy is closed over the weekend

## 2023-04-02 NOTE — PROGRESS NOTES
[address] [place] [address]  Progress Note  Name: Mahendra Gaspar  MRN: [id number]  Unit/Bed#: E5 -01 I Date of Admission: 3/23/2023   Date of Service: 4/2/2023 I Hospital Day: 9    Assessment/Plan   * Small bowel obstruction [facility])  Assessment & Plan  Patient presented to the ED following multiple episodes of vomiting at home x one day  She reports two episodes of diarrhea at home, patient had multiple episodes of vomiting in the ED  · CT abdomen/pelvis showed mid to distal high-grade small bowel obstruction possibly due to adhesions  · Surgery consultation appreciated  · Surgery recommended NGT however attempts unsuccessful after which patient refused  · 3/27 started on clear liquid diet, had a bowel movement  · 3/28 morning patient complaining of no flatulence, no bowel movement, distension after eating  · 3/28 CT abdomen pelvis with p o  contrast showed ileus versus partial small bowel obstruction, partially decompressed distal small bowel loops without evidence of discrete transition point  · 3/29 Obstruction series showed contrast in right colon  · Advanced diet to low residue diet, tolerating well  · Has Bowel movement daily  · Encourage ambulation      Witnessed seizure-like activity ([facility] [address] )  Assessment & Plan  · Patient was supposed to be discharged on saturday at 1:30 pm to rehab  Around 1 pm, she walked to the bathroom, sat on the toilet, stood up without telling the aide that she is going to stand up, her walker tripped, she fell and hit her head on the shower wall  She did not lose consciousness  I evaluated her in the bathroom, she was sitting on the toilet, vitals normal   No obvious signs of injury  Transportation arrived and they were standing in front of the door to pick her up to go to rehab  I explained to the patient that due to the fall and head strike we will have to do a CT head and we will push back her transportation for later that afternoon or tomorrow morning  Patient became very emotional, agitated, had trouble forming words, started hitting her mouth and she became unresponsive with minimal generalized body shaking, lasted for about 20 seconds  After that she seemed to be postictal   No bowel or bladder incontinence  Rapid response was called  Daughter notified, voiced understanding  · Upon chart review: Patient had a witnessed seizure-like episode while admitted to [facility] in May 2022  EEG at that time was negative  She is following with [facility] neurology  Ambulatory EEG was done in March 2023, nonspecific findings, mild diffuse cerebral dysfunction which could be due to psychotropic medications, no epileptiform discharges in 47 hours  Not on AED  Per daughter she can get very emotional when things does not go as planned  · Stat CT head showed no acute intracranial abnormality  · Lactic acid initially elevated, cleared with IV fluids  · Elevated prolactin, which could be elevated in stress  · Started on Keppra 1000 mg twice daily  · Seizure precautions  · Evaluated by neurology today, routine EEG tomorrow  · No need for AED, suspect pseudoseizure, discontinue Keppra    Fall  Assessment & Plan  · Reviewing chart patient has a history of falls  · Earlier this week patient tripped and fell in the bathroom while washing up     · X ray of the right hip and knee negative  · Shayla Foil and hit her head on Friday  · CT no acute abnormality  · Fall precautions    Memory loss  Assessment & Plan  · Patient not great historian   · Followed outpatient by neurology for this issue   · Scheduled to follow-up outpatient with neuropsych after ENT hearing evaluation completed    Anemia  Assessment & Plan  · All cell lines dropped, most likely dilutional  · Hemoglobin has been stable in the range 10/11  · Continue to monitor    Hypokalemia  Assessment & Plan  · Replete as needed    Mixed hyperlipidemia  Assessment & Plan  · Continue statin    Tardive dyskinesia  Assessment & Plan  · Patient with chronic tardive dyskinesia secondary to psychiatric medications  · Continue 1 mg cogentin b i d   · Ingrezza 40 mg daily (nonformulary, family did not provide it)--see plan above    Essential hypertension  Assessment & Plan  · Continue amlodipine     Bipolar II disorder ([facility] [address] )  Assessment & Plan  · Patient mood is stable with tardive dyskinesia at baseline  · Continue home medication regimen  · 50 mg seroquel t i d  · 300 mg lithium h s  · 30 mg paxil b i d  · 60 mg paxil h s   · 20 mg buspar t i d  · 40 mg ingrezza daily--nonformulary, not provided by family  · 7 5 mg remeron h s   · 50 mg hydroxyzine b i d prn  · Eri Noble is nonformulary, family brought all her medications in on Friday but the Ingrezza bottle was empty  Daughter called [facility] specialty pharmacy [phone] and they need a new prescription  Daughter called her psychiatrist and apparently they said who ever is taking care of her now inpatient should call in the medication  Pharmacy is closed over the weekend  Chronic pain disorder  Assessment & Plan  · Continue 50 mg pregabalin b i d           VTE Pharmacologic Prophylaxis: VTE Score: 2 Lovenox    Patient Centered Rounds: I performed bedside rounds with nursing staff today  Discussions with Specialists or Other Care Team Provider: Nursing, case management    Education and Discussions with Family / Patient: Updated  (daughter) via phone  Total Time Spent on Date of Encounter in care of patient: 35 minutes This time was spent on one or more of the following: performing physical exam; counseling and coordination of care; obtaining or reviewing history; documenting in the medical record; reviewing/ordering tests, medications or procedures; communicating with other healthcare professionals and discussing with patient's family/caregivers      Current Length of Stay: 9 day(s)  Current Patient Status: Inpatient   Certification Statement: The patient will continue to require additional inpatient hospital stay due to Seizure-like activity  Discharge Plan: Anticipate discharge in 24-48 hrs to rehab facility  Code Status: Level 1 - Full Code    Subjective:   Patient was seen and evaluated bedside, no overnight events per nursing staff  Denies chest pain palpitation or shortness of breath  Denies headache, denies lightheadedness, dizziness    Objective:     Vitals:   Temp (24hrs), Av 8 °F (36 6 °C), Min:97 8 °F (36 6 °C), Max:97 8 °F (36 6 °C)    Temp:  [97 8 °F (36 6 °C)] 97 8 °F (36 6 °C)  HR:  [71-85] 85  Resp:  [16-18] 16  BP: (117-126)/(78-91) 122/78  SpO2:  [95 %-97 %] 97 %  Body mass index is 30 2 kg/m²  Input and Output Summary (last 24 hours): Intake/Output Summary (Last 24 hours) at 2023 1645  Last data filed at 2023 2317  Gross per 24 hour   Intake --   Output 1000 ml   Net -1000 ml       Physical Exam:   Physical Exam  Constitutional:       General: She is not in acute distress  HENT:      Head: Atraumatic  Cardiovascular:      Rate and Rhythm: Normal rate and regular rhythm  Heart sounds: No murmur heard  No friction rub  No gallop  Pulmonary:      Effort: Pulmonary effort is normal  No respiratory distress  Breath sounds: Normal breath sounds  No wheezing  Abdominal:      General: Bowel sounds are normal  There is no distension  Palpations: Abdomen is soft  Tenderness: There is no abdominal tenderness  Musculoskeletal:         General: No swelling  Cervical back: Neck supple  Skin:     General: Skin is warm and dry  Neurological:      Mental Status: She is alert  Mental status is at baseline        Comments: Tongue rolling   Psychiatric:         Mood and Affect: Mood normal           Additional Data:     Labs:  Results from last 7 days   Lab Units 23  0520 23  1319 23  0443   WBC Thousand/uL 4 60   < > 3 80*   HEMOGLOBIN g/dL 12 2   < > 10 3*   HEMATOCRIT % 38 8   < > 31 8*   PLATELETS Thousands/uL 304   < > 265   BANDS PCT %  --   --  1   NEUTROS PCT % 53  --   --    LYMPHS PCT % 34  --   --    LYMPHO PCT   --    < > 42   MONOS PCT % 7  --   --    MONO PCT   --    < > 10   EOS PCT % 4   < > 7*    < > = values in this interval not displayed  Results from last 7 days   Lab Units 23  0520 23  0443 23  0502   SODIUM mmol/L 140   < > 142   POTASSIUM mmol/L 4 0   < > 3 5   CHLORIDE mmol/L 109*   < > 110*   CO2 mmol/L 24   < > 25   BUN mg/dL 7   < > 2*   CREATININE mg/dL 0 72   < > 0 62   ANION GAP mmol/L 7   < > 7   CALCIUM mg/dL 8 5   < > 8 1*   ALBUMIN g/dL  --   --  3 4*   TOTAL BILIRUBIN mg/dL  --   --  0 98   ALK PHOS U/L  --   --  51   ALT U/L  --   --  7   AST U/L  --   --  9*   GLUCOSE RANDOM mg/dL 76   < > 75    < > = values in this interval not displayed  Results from last 7 days   Lab Units 23  1304   POC GLUCOSE mg/dl 109         Results from last 7 days   Lab Units 23  1754 23  1319   LACTIC ACID mmol/L 0 9 4 6*       Lines/Drains:  Invasive Devices     Peripheral Intravenous Line  Duration           Peripheral IV 23 Distal;Right;Upper;Ventral (anterior) Arm 1 day                  Telemetry:  Telemetry Orders (From admission, onward)             24 Hour Telemetry Monitoring  Continuous x 24 Hours (Telem)           References:    Telemetry Guidelines   Question:  Reason for 24 Hour Telemetry  Answer:  Metabolic/Electrolyte Disturbance with High Probability of Dysrhythmia (K level <3 or >6, or KCL infusion >10mEq/hr)                 Telemetry Reviewed: Normal Sinus Rhythm  Indication for Continued Telemetry Use: No indication for continued use  Will discontinue                Imaging: Reviewed radiology reports from this admission including: CT head    Recent Cultures (last 7 days):         Last 24 Hours Medication List:   Current Facility-Administered Medications   Medication Dose Route Frequency Provider Last Rate   • acetaminophen  650 mg Oral Q6H PRN Ragini Loyd PA-C     • aluminum-magnesium hydroxide-simethicone  30 mL Oral Q6H PRN Ragini Loyd PA-C     • amLODIPine  10 mg Oral Daily Ragini Loyd PA-C     • aspirin  81 mg Oral Daily Ragini Lyod PA-C     • atorvastatin  40 mg Oral After Talon Szymanski PA-C     • benztropine  1 mg Oral BID Ragini Loyd PA-C     • busPIRone  20 mg Oral TID Ragini Loyd PA-C     • enoxaparin  40 mg Subcutaneous Q24H [address] Divine Kapoor MD     • ferrous sulfate  325 mg Oral Daily With Breakfast Ragini Loyd PA-C     • folic acid  8,512 mcg Oral Daily Ragini Loyd PA-C     • HYDROmorphone  0 5 mg Intravenous Q4H PRN Divine Kapoor MD     • hydrOXYzine HCL  50 mg Oral TID PRN Ragini Loyd PA-C     • lithium carbonate  300 mg Oral HS Ragini Loyd PA-C     • LORazepam  2 mg Intravenous Once PRN Sherri King MD     • LORazepam  0 5 mg Oral BID PRN Ragini Loyd PA-C     • mirtazapine  7 5 mg Oral QPM Ragini Loyd PA-C     • ondansetron  4 mg Intravenous Q6H PRN Ragini Loyd PA-C     • oxyCODONE  5 mg Oral Q4H PRN Divine Kapoor MD     • pantoprazole  40 mg Oral Daily Before Breakfast Sherri King MD     • PARoxetine  30 mg Oral BID Ragini Loyd PA-C     • PARoxetine  60 mg Oral HS Ragini Loyd PA-C     • prazosin  2 mg Oral HS Ragini Loyd PA-C     • pregabalin  50 mg Oral BID Ragini Loyd PA-C     • QUEtiapine  50 mg Oral TID Ragini Loyd PA-C     • Valbenazine Tosylate  40 mg Oral Daily Ragini Loyd PA-C          Today, Patient Was Seen By: Sherri King MD    **Please Note: This note may have been constructed using a voice recognition system  **

## 2023-04-02 NOTE — ASSESSMENT & PLAN NOTE
"Neurology is asked to see this 78-year-old right-hand-dominant woman who is known to our outpatient office for tardive dyskinesia, and memory loss particularly  This patient has been in this hospital, now day 10 for a small bowel obstruction which was managed conservatively  Yesterday on day 9, April 1, 2023 the patient was tentatively clear for discharged today to rehab  Notes indicate that she apparently was using the bathroom prior to transfer when she had apparently finished with using the toilet and may have actually been at the sink washing her hands and she was appropriately using her walker but she may have caught her walker and tripped she fell and hit her head on the wall  She did not lose her consciousness  She had a rapid response called and those notes were reviewed  There is a detailed note from her nurse at that time, I refer the reader to the note from 1:45 PM   Reportedly the patient was able to still ambulate back to the bed, the notes about her coming \"increasingly agitated and began having difficulty forming her words\" would be consistent with anxiety coupled with her memory disorder and her rather severe tardive dyskinesia  That note in particular goes on to report that the patient was \"grabbing at her mouth while trying to speak\" then followed by a period of unresponsiveness and being limp  These reports in those noted during the rapid response note appeared to be more consistent with that of a nonepileptic seizure  This patient has had both routine as well as ambulatory EEGs previously, 21 and 22 and she did not have any epileptic features on either of these tracings even though she also had behavioral events on the EEG in 2021 that appeared to be similar to the events that she had yesterday  However, because she was reported to be unresponsive we will get a routine EEG on her tomorrow    Her exam today appears to be consistent with her exam noted by nonneurologic providers over the " last several days as well as consistent with her exam our neurology provider last saw her in the office March 9 of this year  At this time we would not favor starting her on any antiepileptics

## 2023-04-03 RX ADMIN — PANTOPRAZOLE SODIUM 40 MG: 40 TABLET, DELAYED RELEASE ORAL at 06:37

## 2023-04-03 RX ADMIN — PREGABALIN 50 MG: 50 CAPSULE ORAL at 08:02

## 2023-04-03 RX ADMIN — PREGABALIN 50 MG: 50 CAPSULE ORAL at 17:58

## 2023-04-03 RX ADMIN — PAROXETINE 60 MG: 20 TABLET, FILM COATED ORAL at 21:13

## 2023-04-03 RX ADMIN — MIRTAZAPINE 7.5 MG: 15 TABLET, FILM COATED ORAL at 17:58

## 2023-04-03 RX ADMIN — PAROXETINE 30 MG: 20 TABLET, FILM COATED ORAL at 17:58

## 2023-04-03 RX ADMIN — ASPIRIN 81 MG: 81 TABLET, COATED ORAL at 08:02

## 2023-04-03 RX ADMIN — PAROXETINE 30 MG: 20 TABLET, FILM COATED ORAL at 08:02

## 2023-04-03 RX ADMIN — AMLODIPINE BESYLATE 10 MG: 10 TABLET ORAL at 08:02

## 2023-04-03 RX ADMIN — QUETIAPINE FUMARATE 50 MG: 25 TABLET ORAL at 21:13

## 2023-04-03 RX ADMIN — QUETIAPINE FUMARATE 50 MG: 25 TABLET ORAL at 17:58

## 2023-04-03 RX ADMIN — BUSPIRONE HYDROCHLORIDE 20 MG: 10 TABLET ORAL at 08:02

## 2023-04-03 RX ADMIN — FERROUS SULFATE TAB 325 MG (65 MG ELEMENTAL FE) 325 MG: 325 (65 FE) TAB at 08:02

## 2023-04-03 RX ADMIN — BENZTROPINE MESYLATE 1 MG: 1 TABLET ORAL at 17:58

## 2023-04-03 RX ADMIN — ATORVASTATIN CALCIUM 40 MG: 40 TABLET, FILM COATED ORAL at 17:58

## 2023-04-03 RX ADMIN — BUSPIRONE HYDROCHLORIDE 20 MG: 10 TABLET ORAL at 17:58

## 2023-04-03 RX ADMIN — QUETIAPINE FUMARATE 50 MG: 25 TABLET ORAL at 08:02

## 2023-04-03 RX ADMIN — ENOXAPARIN SODIUM 40 MG: 100 INJECTION SUBCUTANEOUS at 08:02

## 2023-04-03 RX ADMIN — BUSPIRONE HYDROCHLORIDE 20 MG: 10 TABLET ORAL at 21:12

## 2023-04-03 RX ADMIN — LITHIUM CARBONATE 300 MG: 300 TABLET, FILM COATED, EXTENDED RELEASE ORAL at 21:14

## 2023-04-03 RX ADMIN — PRAZOSIN HYDROCHLORIDE 2 MG: 2 CAPSULE ORAL at 21:14

## 2023-04-03 RX ADMIN — OXYCODONE 5 MG: 5 TABLET ORAL at 21:11

## 2023-04-03 RX ADMIN — BENZTROPINE MESYLATE 1 MG: 1 TABLET ORAL at 08:02

## 2023-04-03 RX ADMIN — FOLIC ACID 1000 MCG: 1 TABLET ORAL at 08:02

## 2023-04-03 NOTE — PROGRESS NOTES
[address] [place] [address]  Progress Note  Name: Saba Romero  MRN: [id number]  Unit/Bed#: E5 -01 I Date of Admission: 3/23/2023   Date of Service: 4/3/2023 I Hospital Day: 10    Assessment/Plan   * Small bowel obstruction [facility])  Assessment & Plan  Background: Patient presented to the ED following multiple episodes of vomiting at home found to have high-grade small bowel obstruction  · Was followed by surgery  NG tube was unsuccessful in placement  · Eventually diet was started and tolerated  · Repeat imaging subsequently demonstrated contrast in the right colon and has had bowel movements  · Disposition: Awaiting rehab placement    Witnessed seizure-like activity [facility])  Assessment & Plan  · Day of planned discharge 4/1/23 the patient had a fall and subsequently seizure-like activity which was witnessed  · Neurology consulted  History of nonepileptic seizures in the past   · Seen by neurology this admission  · Awaiting EEG but suspicion is nonepileptic and Keppra has been discontinued    Mixed hyperlipidemia  Assessment & Plan  · Continue atorvastatin    Tardive dyskinesia  Assessment & Plan  · Chronic tar dive dyskinesia follows with psychiatry and neurology as an outpatient  · Continue ingrezza and benztropine    Essential hypertension  Assessment & Plan  · Blood pressure stable on amlodipine    Bipolar disorder (HCC)  Assessment & Plan  · Mood stable  · Continue home regimen of quetiapine 50 mg 3 times daily, lithium 300 mg at bedtime, paroxetine 30 mg twice daily and 60 mg at bedtime, buspirone and ingrezza        VTE Pharmacologic Prophylaxis:  Moderate Risk (Score 3-4) - Pharmacological DVT Prophylaxis Ordered: enoxaparin (Lovenox)  Patient Centered Rounds: I have performed bedside rounds with nursing staff today    Discussions with Specialists or Other Care Team Provider: Case management    Education and Discussions with Family / Patient: Attempted to update  "(daughter) via phone  Left voicemail  Time Spent for Care: This time was spent on one or more of the following: performing physical exam; counseling and coordination of care; obtaining or reviewing history; documenting in the medical record; reviewing/ordering tests, medications or procedures; communicating with other healthcare professionals and discussing with patient's family/caregivers  Current Length of Stay: 10 day(s)  Current Patient Status: Inpatient   Certification Statement: The patient will continue to require additional inpatient hospital stay due to EEG  Discharge Plan: Anticipate discharge tomorrow to rehab facility  Code Status: Level 1 - Full Code      Subjective:   Patient seen and examined  No new complaints  Awaiting EEG    Objective:   Vitals: Blood pressure 141/89, pulse 81, temperature 98 2 °F (36 8 °C), temperature source Oral, resp  rate (!) 24, height 5' 1\" (1 549 m), weight 72 5 kg (159 lb 13 3 oz), SpO2 98 %, not currently breastfeeding  No intake or output data in the 24 hours ending 04/03/23 1816    Physical Exam  Vitals reviewed  Constitutional:       General: She is not in acute distress  Appearance: Normal appearance  HENT:      Head: Atraumatic  Cardiovascular:      Rate and Rhythm: Regular rhythm  Heart sounds: Normal heart sounds  Pulmonary:      Breath sounds: Decreased breath sounds present  No wheezing  Abdominal:      General: Bowel sounds are normal       Palpations: Abdomen is soft  Tenderness: There is no guarding or rebound  Musculoskeletal:         General: No swelling  Skin:     General: Skin is warm  Neurological:      Mental Status: She is alert        Comments: Tar dive dyskinesia       Additional Data:   Labs:  Results from last 7 days   Lab Units 04/02/23  0520 04/01/23  1319 03/30/23  0443   WBC Thousand/uL 4 60 6 17 3 80*   HEMOGLOBIN g/dL 12 2 13 9 10 3*   PLATELETS Thousands/uL 304 325 265   MCV fL 95 92 92   TOTAL NEUT " ABS Thousand/uL  --  2 65 1 56*   BANDS PCT %  --   --  1     Results from last 7 days   Lab Units 04/02/23  0520 04/01/23  1319 04/01/23  0520 03/31/23  0533 03/30/23  0443 03/29/23  0502   SODIUM mmol/L 140 139  --  141   < > 142   POTASSIUM mmol/L 4 0 3 9 3 6 3 4*   < > 3 5   CHLORIDE mmol/L 109* 104  --  106   < > 110*   CO2 mmol/L 24 24  --  27   < > 25   ANION GAP mmol/L 7 11  --  8   < > 7   BUN mg/dL 7 9  --  6   < > 2*   CREATININE mg/dL 0 72 1 03  --  0 82   < > 0 62   CALCIUM mg/dL 8 5 8 7  --  8 0*   < > 8 1*   ALBUMIN g/dL  --   --   --   --   --  3 4*   TOTAL BILIRUBIN mg/dL  --   --   --   --   --  0 98   ALK PHOS U/L  --   --   --   --   --  51   ALT U/L  --   --   --   --   --  7   AST U/L  --   --   --   --   --  9*   EGFR ml/min/1 73sq m 91 59  --  78   < > 99   GLUCOSE RANDOM mg/dL 76 104  --  85   < > 75    < > = values in this interval not displayed  Results from last 7 days   Lab Units 04/02/23  0520 04/01/23  1319 03/29/23  0502 03/28/23  0534   MAGNESIUM mg/dL 2 5 1 7* 2 1 1 8*     Results from last 7 days   Lab Units 04/01/23  1319   CK TOTAL U/L 57              Results from last 7 days   Lab Units 04/01/23  1754 04/01/23  1319   LACTIC ACID mmol/L 0 9 4 6*     Results from last 7 days   Lab Units 04/01/23  1304   POC GLUCOSE mg/dl 109             * I Have Reviewed All Lab Data Listed Above      Cultures:   Results from last 7 days   Lab Units 03/31/23  1404   INFLUENZA A PCR  Negative       Results from last 7 days   Lab Units 03/31/23  1404   SARS-COV-2  Negative   INFLUENZA A PCR  Negative   INFLUENZA B PCR  Negative   RSV PCR  Negative           Lines/Drains:  Invasive Devices     Peripheral Intravenous Line  Duration           Peripheral IV 04/01/23 Distal;Right;Upper;Ventral (anterior) Arm 2 days              Telemetry:      Imaging:  Imaging Reports Reviewed Today Include:   XR abdomen 1 view kub    Result Date: 3/26/2023  Impression: Findings consistent with small bowel obstruction without appreciable change  This is clinically noted in the assessment and plan note of Chrissy Boggs MD from 8:35 AM March 26, 2023  This report is also marked for immediate notification for this inpatient  Workstation performed: [id number]     CT chest abdomen pelvis w contrast    Result Date: 3/24/2023  Impression: Small amount of perihepatic and pelvic fluid     Mid to distal high-grade small bowel obstruction possibly due to adhesions  Groundglass changes in the right lower lobe may be due to respiration or developing infiltrate  The study was marked in [facility] for immediate notification  Workstation performed: [id number]     CT abdomen pelvis w contrast    Result Date: 3/28/2023  Impression: Findings suggestive of ileus versus partial small bowel obstruction  Partially decompressed distal small bowel loops without evidence of discrete transition point   Workstation performed: [id number]       Scheduled Meds:  Current Facility-Administered Medications   Medication Dose Route Frequency Provider Last Rate   • acetaminophen  650 mg Oral Q6H PRN Cortney Holden PA-C     • aluminum-magnesium hydroxide-simethicone  30 mL Oral Q6H PRN Cortney Holden PA-C     • amLODIPine  10 mg Oral Daily Cortney Holden PA-C     • aspirin  81 mg Oral Daily Cortney Holden PA-C     • atorvastatin  40 mg Oral After Emanuel Calderon PA-C     • benztropine  1 mg Oral BID Cortney Holden PA-C     • busPIRone  20 mg Oral TID Cortney Holden PA-C     • enoxaparin  40 mg Subcutaneous Q24H [address] Chrissy Boggs MD     • ferrous sulfate  325 mg Oral Daily With Breakfast Cortney Holden PA-C     • folic acid  7,788 mcg Oral Daily Cortney Holden PA-C     • HYDROmorphone  0 5 mg Intravenous Q4H PRN Chrissy Boggs MD     • hydrOXYzine HCL  50 mg Oral TID PRN Cortney Holden PA-C     • lithium carbonate  300 mg Oral HS Cortney Holden PA-C     • LORazepam  2 mg Intravenous Once PRN Taylor Gaspar MD     • LORazepam  0 5 mg Oral BID PRN Prabhakar Gray PA-C     • mirtazapine  7 5 mg Oral QPM Prabhakar Gray PA-C     • ondansetron  4 mg Intravenous Q6H PRN Prabhakar Gray PA-C     • oxyCODONE  5 mg Oral Q4H PRN Anali Katz MD     • pantoprazole  40 mg Oral Daily Before Breakfast Judson Gutierrez MD     • PARoxetine  30 mg Oral BID Prabhakar Gray PA-C     • PARoxetine  60 mg Oral HS Prabhakar Gray PA-C     • prazosin  2 mg Oral HS Prabhakar Gray PA-C     • pregabalin  50 mg Oral BID Prabhakar Gray PA-C     • QUEtiapine  50 mg Oral TID Prabhakar Gray PA-C     • Valbenazine Tosylate  40 mg Oral Daily Prabhakar Gray PA-C         Today, Patient Was Seen By: Jeff Jhaveri DO    ** Please Note: Dictation voice to text software may have been used in the creation of this document   **

## 2023-04-03 NOTE — ASSESSMENT & PLAN NOTE
Background: Patient presented to the ED following multiple episodes of vomiting at home found to have high-grade small bowel obstruction  · Was followed by surgery  NG tube was unsuccessful in placement    · Eventually diet was started and tolerated  · Repeat imaging subsequently demonstrated contrast in the right colon and has had bowel movements  · Disposition: Awaiting rehab placement

## 2023-04-03 NOTE — ASSESSMENT & PLAN NOTE
· Chronic tar dive dyskinesia follows with psychiatry and neurology as an outpatient    · Continue ingrezza and benztropine

## 2023-04-03 NOTE — PLAN OF CARE
Problem: Potential for Falls  Goal: Patient will remain free of falls  Description: INTERVENTIONS:  - Educate patient/family on patient safety including physical limitations  - Instruct patient to call for assistance with activity   - Consult OT/PT to assist with strengthening/mobility   - Keep Call bell within reach  - Keep bed low and locked with side rails adjusted as appropriate  - Keep care items and personal belongings within reach  - Initiate and maintain comfort rounds  - Make Fall Risk Sign visible to staff  - Offer Toileting every 2 Hours, in advance of need  - Initiate/Maintain bed alarm  - Obtain necessary fall risk management equipment  - Apply yellow socks and bracelet for high fall risk patients  - Consider moving patient to room near nurses station  Outcome: Progressing     Problem: PAIN - ADULT  Goal: Verbalizes/displays adequate comfort level or baseline comfort level  Description: Interventions:  - Encourage patient to monitor pain and request assistance  - Assess pain using appropriate pain scale  - Administer analgesics based on type and severity of pain and evaluate response  - Implement non-pharmacological measures as appropriate and evaluate response  - Consider cultural and social influences on pain and pain management  - Notify physician/advanced practitioner if interventions unsuccessful or patient reports new pain  Outcome: Progressing     Problem: INFECTION - ADULT  Goal: Absence or prevention of progression during hospitalization  Description: INTERVENTIONS:  - Assess and monitor for signs and symptoms of infection  - Monitor lab/diagnostic results  - Monitor all insertion sites, i e  indwelling lines, tubes, and drains  - Monitor endotracheal if appropriate and nasal secretions for changes in amount and color  - [place] appropriate cooling/warming therapies per order  - Administer medications as ordered  - Instruct and encourage patient and family to use good hand hygiene technique  - Identify and instruct in appropriate isolation precautions for identified infection/condition  Outcome: Progressing     Problem: SAFETY ADULT  Goal: Patient will remain free of falls  Description: INTERVENTIONS:  - Educate patient/family on patient safety including physical limitations  - Instruct patient to call for assistance with activity   - Consult OT/PT to assist with strengthening/mobility   - Keep Call bell within reach  - Keep bed low and locked with side rails adjusted as appropriate  - Keep care items and personal belongings within reach  - Initiate and maintain comfort rounds  - Make Fall Risk Sign visible to staff  - Offer Toileting every 2 Hours, in advance of need  - Initiate/Maintain bed alarm  - Obtain necessary fall risk management equipment  - Apply yellow socks and bracelet for high fall risk patients  - Consider moving patient to room near nurses station  Outcome: Progressing  Goal: Maintain or return to baseline ADL function  Description: INTERVENTIONS:  -  Assess patient's ability to carry out ADLs; assess patient's baseline for ADL function and identify physical deficits which impact ability to perform ADLs (bathing, care of mouth/teeth, toileting, grooming, dressing, etc )  - Assess/evaluate cause of self-care deficits   - Assess range of motion  - Assess patient's mobility; develop plan if impaired  - Assess patient's need for assistive devices and provide as appropriate  - Encourage maximum independence but intervene and supervise when necessary  - Involve family in performance of ADLs  - Assess for home care needs following discharge   - Consider OT consult to assist with ADL evaluation and planning for discharge  - Provide patient education as appropriate  Outcome: Progressing  Goal: Maintains/Returns to pre admission functional level  Description: INTERVENTIONS:  - Perform BMAT or MOVE assessment daily    - Set and communicate daily mobility goal to care team and patient/family/caregiver  - Collaborate with rehabilitation services on mobility goals if consulted  - Out of bed for toileting  - Record patient progress and toleration of activity level   Outcome: Progressing     Problem: DISCHARGE PLANNING  Goal: Discharge to home or other facility with appropriate resources  Description: INTERVENTIONS:  - Identify barriers to discharge w/patient and caregiver  - Arrange for needed discharge resources and transportation as appropriate  - Identify discharge learning needs (meds, wound care, etc )  - Arrange for interpretive services to assist at discharge as needed  - Refer to Case Management Department for coordinating discharge planning if the patient needs post-hospital services based on physician/advanced practitioner order or complex needs related to functional status, cognitive ability, or social support system  Outcome: Progressing     Problem: Knowledge Deficit  Goal: Patient/family/caregiver demonstrates understanding of disease process, treatment plan, medications, and discharge instructions  Description: Complete learning assessment and assess knowledge base    Interventions:  - Provide teaching at level of understanding  - Provide teaching via preferred learning methods  Outcome: Progressing     Problem: MOBILITY - ADULT  Goal: Maintain or return to baseline ADL function  Description: INTERVENTIONS:  -  Assess patient's ability to carry out ADLs; assess patient's baseline for ADL function and identify physical deficits which impact ability to perform ADLs (bathing, care of mouth/teeth, toileting, grooming, dressing, etc )  - Assess/evaluate cause of self-care deficits   - Assess range of motion  - Assess patient's mobility; develop plan if impaired  - Assess patient's need for assistive devices and provide as appropriate  - Encourage maximum independence but intervene and supervise when necessary  - Involve family in performance of ADLs  - Assess for home care needs following discharge   - Consider OT consult to assist with ADL evaluation and planning for discharge  - Provide patient education as appropriate  Outcome: Progressing  Goal: Maintains/Returns to pre admission functional level  Description: INTERVENTIONS:  - Perform BMAT or MOVE assessment daily    - Set and communicate daily mobility goal to care team and patient/family/caregiver  - Collaborate with rehabilitation services on mobility goals if consulted  - Out of bed for toileting  - Record patient progress and toleration of activity level   Outcome: Progressing     Problem: Nutrition/Hydration-ADULT  Goal: Nutrient/Hydration intake appropriate for improving, restoring or maintaining nutritional needs  Description: Monitor and assess patient's nutrition/hydration status for malnutrition  Collaborate with interdisciplinary team and initiate plan and interventions as ordered  Monitor patient's weight and dietary intake as ordered or per policy  Utilize nutrition screening tool and intervene as necessary  Determine patient's food preferences and provide high-protein, high-caloric foods as appropriate       INTERVENTIONS:  - Monitor oral intake, urinary output, labs, and treatment plans  - Assess nutrition and hydration status and recommend course of action  - Evaluate amount of meals eaten  - Assist patient with eating if necessary   - Allow adequate time for meals  - Recommend/ encourage appropriate diets, oral nutritional supplements, and vitamin/mineral supplements  - Order, calculate, and assess calorie counts as needed  - Recommend, monitor, and adjust tube feedings and TPN/PPN based on assessed needs  - Assess need for intravenous fluids  - Provide specific nutrition/hydration education as appropriate  - Include patient/family/caregiver in decisions related to nutrition  Outcome: Progressing     Problem: NEUROSENSORY - ADULT  Goal: Achieves stable or improved neurological status  Description: INTERVENTIONS  - Monitor and report changes in neurological status  - Monitor vital signs such as temperature, blood pressure, glucose, and any other labs ordered   - Initiate measures to prevent increased intracranial pressure  - Monitor for seizure activity and implement precautions if appropriate      Outcome: Progressing  Goal: Remains free of injury related to seizures activity  Description: INTERVENTIONS  - Maintain airway, patient safety  and administer oxygen as ordered  - Monitor patient for seizure activity, document and report duration and description of seizure to physician/advanced practitioner  - If seizure occurs,  ensure patient safety during seizure  - Reorient patient post seizure  - Seizure pads on all 4 side rails  - Instruct patient/family to notify RN of any seizure activity including if an aura is experienced  - Instruct patient/family to call for assistance with activity based on nursing assessment  - Administer anti-seizure medications if ordered    Outcome: Progressing

## 2023-04-03 NOTE — ASSESSMENT & PLAN NOTE
· Day of planned discharge 4/1/23 the patient had a fall and subsequently seizure-like activity which was witnessed  · Neurology consulted  History of nonepileptic seizures in the past   · Seen by neurology this admission    · Awaiting EEG but suspicion is nonepileptic and Keppra has been discontinued

## 2023-04-03 NOTE — PLAN OF CARE
Problem: Potential for Falls  Goal: Patient will remain free of falls  Description: INTERVENTIONS:  - Educate patient/family on patient safety including physical limitations  - Instruct patient to call for assistance with activity   - Consult OT/PT to assist with strengthening/mobility   - Keep Call bell within reach  - Keep bed low and locked with side rails adjusted as appropriate  - Keep care items and personal belongings within reach  - Initiate and maintain comfort rounds  - Make Fall Risk Sign visible to staff  - Offer Toileting every 2 Hours, in advance of need  - Initiate/Maintain bed alarm  - Obtain necessary fall risk management equipment  - Apply yellow socks and bracelet for high fall risk patients  - Consider moving patient to room near nurses station  Outcome: Progressing     Problem: PAIN - ADULT  Goal: Verbalizes/displays adequate comfort level or baseline comfort level  Description: Interventions:  - Encourage patient to monitor pain and request assistance  - Assess pain using appropriate pain scale  - Administer analgesics based on type and severity of pain and evaluate response  - Implement non-pharmacological measures as appropriate and evaluate response  - Consider cultural and social influences on pain and pain management  - Notify physician/advanced practitioner if interventions unsuccessful or patient reports new pain  Outcome: Progressing     Problem: INFECTION - ADULT  Goal: Absence or prevention of progression during hospitalization  Description: INTERVENTIONS:  - Assess and monitor for signs and symptoms of infection  - Monitor lab/diagnostic results  - Monitor all insertion sites, i e  indwelling lines, tubes, and drains  - Monitor endotracheal if appropriate and nasal secretions for changes in amount and color  - [place] appropriate cooling/warming therapies per order  - Administer medications as ordered  - Instruct and encourage patient and family to use good hand hygiene technique  - Identify and instruct in appropriate isolation precautions for identified infection/condition  Outcome: Progressing     Problem: SAFETY ADULT  Goal: Patient will remain free of falls  Description: INTERVENTIONS:  - Educate patient/family on patient safety including physical limitations  - Instruct patient to call for assistance with activity   - Consult OT/PT to assist with strengthening/mobility   - Keep Call bell within reach  - Keep bed low and locked with side rails adjusted as appropriate  - Keep care items and personal belongings within reach  - Initiate and maintain comfort rounds  - Make Fall Risk Sign visible to staff  - Offer Toileting every 2 Hours, in advance of need  - Initiate/Maintain bed alarm  - Obtain necessary fall risk management equipment  - Apply yellow socks and bracelet for high fall risk patients  - Consider moving patient to room near nurses station  Outcome: Progressing  Goal: Maintain or return to baseline ADL function  Description: INTERVENTIONS:  -  Assess patient's ability to carry out ADLs; assess patient's baseline for ADL function and identify physical deficits which impact ability to perform ADLs (bathing, care of mouth/teeth, toileting, grooming, dressing, etc )  - Assess/evaluate cause of self-care deficits   - Assess range of motion  - Assess patient's mobility; develop plan if impaired  - Assess patient's need for assistive devices and provide as appropriate  - Encourage maximum independence but intervene and supervise when necessary  - Involve family in performance of ADLs  - Assess for home care needs following discharge   - Consider OT consult to assist with ADL evaluation and planning for discharge  - Provide patient education as appropriate  Outcome: Progressing  Goal: Maintains/Returns to pre admission functional level  Description: INTERVENTIONS:  - Perform BMAT or MOVE assessment daily    - Set and communicate daily mobility goal to care team and patient/family/caregiver  - Collaborate with rehabilitation services on mobility goals if consulted  - Perform Range of Motion  times a day  - Reposition patient every  hours  - Dangle patient  times a day  - Stand patient  times a day  - Ambulate patient  times a day  - Out of bed to chair  times a day   - Out of bed for meals  times a day  - Out of bed for toileting  - Record patient progress and toleration of activity level   Outcome: Progressing     Problem: DISCHARGE PLANNING  Goal: Discharge to home or other facility with appropriate resources  Description: INTERVENTIONS:  - Identify barriers to discharge w/patient and caregiver  - Arrange for needed discharge resources and transportation as appropriate  - Identify discharge learning needs (meds, wound care, etc )  - Arrange for interpretive services to assist at discharge as needed  - Refer to Case Management Department for coordinating discharge planning if the patient needs post-hospital services based on physician/advanced practitioner order or complex needs related to functional status, cognitive ability, or social support system  Outcome: Progressing     Problem: Knowledge Deficit  Goal: Patient/family/caregiver demonstrates understanding of disease process, treatment plan, medications, and discharge instructions  Description: Complete learning assessment and assess knowledge base    Interventions:  - Provide teaching at level of understanding  - Provide teaching via preferred learning methods  Outcome: Progressing     Problem: MOBILITY - ADULT  Goal: Maintain or return to baseline ADL function  Description: INTERVENTIONS:  -  Assess patient's ability to carry out ADLs; assess patient's baseline for ADL function and identify physical deficits which impact ability to perform ADLs (bathing, care of mouth/teeth, toileting, grooming, dressing, etc )  - Assess/evaluate cause of self-care deficits   - Assess range of motion  - Assess patient's mobility; develop plan if impaired  - Assess patient's need for assistive devices and provide as appropriate  - Encourage maximum independence but intervene and supervise when necessary  - Involve family in performance of ADLs  - Assess for home care needs following discharge   - Consider OT consult to assist with ADL evaluation and planning for discharge  - Provide patient education as appropriate  Outcome: Progressing  Goal: Maintains/Returns to pre admission functional level  Description: INTERVENTIONS:  - Perform BMAT or MOVE assessment daily    - Set and communicate daily mobility goal to care team and patient/family/caregiver  - Collaborate with rehabilitation services on mobility goals if consulted  - Perform Range of Motion  times a day  - Reposition patient every  hours  - Dangle patient  times a day  - Stand patient  times a day  - Ambulate patient  times a day  - Out of bed to chair  times a day   - Out of bed for meal times a day  - Out of bed for toileting  - Record patient progress and toleration of activity level   Outcome: Progressing     Problem: Nutrition/Hydration-ADULT  Goal: Nutrient/Hydration intake appropriate for improving, restoring or maintaining nutritional needs  Description: Monitor and assess patient's nutrition/hydration status for malnutrition  Collaborate with interdisciplinary team and initiate plan and interventions as ordered  Monitor patient's weight and dietary intake as ordered or per policy  Utilize nutrition screening tool and intervene as necessary  Determine patient's food preferences and provide high-protein, high-caloric foods as appropriate       INTERVENTIONS:  - Monitor oral intake, urinary output, labs, and treatment plans  - Assess nutrition and hydration status and recommend course of action  - Evaluate amount of meals eaten  - Assist patient with eating if necessary   - Allow adequate time for meals  - Recommend/ encourage appropriate diets, oral nutritional supplements, and vitamin/mineral supplements  - Order, calculate, and assess calorie counts as needed  - Recommend, monitor, and adjust tube feedings and TPN/PPN based on assessed needs  - Assess need for intravenous fluids  - Provide specific nutrition/hydration education as appropriate  - Include patient/family/caregiver in decisions related to nutrition  Outcome: Progressing     Problem: NEUROSENSORY - ADULT  Goal: Achieves stable or improved neurological status  Description: INTERVENTIONS  - Monitor and report changes in neurological status  - Monitor vital signs such as temperature, blood pressure, glucose, and any other labs ordered   - Initiate measures to prevent increased intracranial pressure  - Monitor for seizure activity and implement precautions if appropriate      Outcome: Progressing  Goal: Remains free of injury related to seizures activity  Description: INTERVENTIONS  - Maintain airway, patient safety  and administer oxygen as ordered  - Monitor patient for seizure activity, document and report duration and description of seizure to physician/advanced practitioner  - If seizure occurs,  ensure patient safety during seizure  - Reorient patient post seizure  - Seizure pads on all 4 side rails  - Instruct patient/family to notify RN of any seizure activity including if an aura is experienced  - Instruct patient/family to call for assistance with activity based on nursing assessment  - Administer anti-seizure medications if ordered    Outcome: Progressing

## 2023-04-03 NOTE — CASE MANAGEMENT
Case Management Discharge Planning Note    Patient name Dick Martel  Location [place] [address]  [id number]-* MRN [id number]  : 1965 Date 4/3/2023       Current Admission Date: 3/23/2023  Current Admission Diagnosis:Small bowel obstruction [facility])   Patient Active Problem List    Diagnosis Date Noted   • Witnessed seizure-like activity ([facility] ) 2023   • Fall 2023   • Small bowel obstruction ([facility] ) 2023   • Memory loss 2023   • Hemiplegia, post-stroke ([facility] ) 2022   • Anemia 2022   • Status post total knee replacement, left 2022   • [facility] ) 2022   • Hypokalemia 2022   • Constipation 03/15/2022   • S/P total knee arthroplasty, right 03/10/2022   • CVA (cerebral vascular accident) ([facility] ) 2022   • Preoperative evaluation to rule out surgical contraindication 2022   • Leukopenia 2021   • Mixed hyperlipidemia 2021   • Medical clearance for psychiatric admission 2021   • History of CVA (cerebrovascular accident) 2021   • Encephalopathy 2021   • Nausea 2021   • Multiple closed fractures of metatarsal bone of right foot 2021   • Chronic back pain 2021   • Arthritis of right knee 10/06/2020   • Dysphagia 2020   • Ambulatory dysfunction 2020   • Status post insertion of spinal cord stimulator 2020   • Superficial bacterial infection of skin 2020   • Scar of back 2020   • Impaired skin integrity associated with surgical incision 2020   • Rash 2020   • Primary osteoarthritis of both knees 2020   • Patellofemoral disorder of both knees 2020   • Tardive dyskinesia 2020   • MIMI (obstructive sleep apnea)    • Complaint related to dreams 2020   • Family history of colorectal cancer 2019   • Encounter for long-term use of opiate analgesic 2019   • Post laminectomy syndrome 10/07/2019   • Lumbar disc disease with radiculopathy 02/02/2018   • Spinal stenosis of lumbar region with neurogenic claudication 02/02/2018   • Lumbar radiculopathy 12/08/2017   • Bilateral hip pain 06/19/2017   • Primary localized osteoarthritis of both knees 06/16/2017   • Chronic pain disorder 02/28/2017   • Opioid dependence ([facility] ) 02/28/2017   • Sacroiliitis ([facility] ) 02/28/2017   • Lumbar spondylosis 10/31/2016   • Osteoarthritis of both hips 10/31/2016   • Generalized anxiety disorder 10/26/2016   • Major depressive disorder, recurrent episode, moderate degree ([facility] ) 09/08/2016   • Right knee pain 06/06/2016   • Recent unexplained weight loss 06/06/2016   • Fatigue 10/26/2015   • Bipolar II disorder ([facility] [facility] ) 06/18/2015   • Panic disorder without agoraphobia 06/18/2015   • Post traumatic stress disorder 06/18/2015   • Left hip pain 07/25/2014   • Intractable low back pain 06/16/2014   • Tremor 06/12/2014   • Migraine 05/27/2014   • Esophageal reflux 05/01/2014   • Cognitive disorder 04/18/2014   • Female pelvic pain 10/30/2013   • Pain in joint, shoulder region 10/28/2013   • Overactive bladder 09/26/2013   • Osteoarthritis of knee 02/20/2013   • Insomnia 01/31/2013   • History of hypokalemia 01/03/2013   • Urinary incontinence 09/24/2012   • Vitamin D deficiency 09/18/2012   • Fibromyalgia 09/14/2012   • Essential hypertension 09/14/2012      LOS (days): 10  Geometric Mean LOS (GMLOS) (days): 3 00  Days to GMLOS:-7 6     OBJECTIVE:  Risk of Unplanned Readmission Score: 28 27         Current admission status: Inpatient   Preferred Pharmacy:   [address] [place] [address]  Phone: [phone] Fax: [phone]    [id number] [address]  [address] [facility] [address]  Phone: [phone] Fax: [facility], [address] [place], [place] [address] [address] [place] [address]  Phone: [phone] Fax: [phone]    [address] [facility],[address], [address]  [facility]  Phone: [phone] Fax: [address] [facility],First Floor, [address] [place] [address]  Phone: [phone] Fax: [phone]    Primary Care Provider: Bridgette Cooper DO    Primary Insurance: Brad Lax MEDICARE [employer] REP  Secondary Insurance: Delsie Sails    DISCHARGE DETAILS:       Additional Comments: CM called [phone] to discuss extending the approved authorization for rehab at [facility]  The representative states she can still discharge tomorrow before 11:59pm- if she doesnt she will need a new auth

## 2023-04-03 NOTE — ASSESSMENT & PLAN NOTE
· Mood stable    · Continue home regimen of quetiapine 50 mg 3 times daily, lithium 300 mg at bedtime, paroxetine 30 mg twice daily and 60 mg at bedtime, buspirone and ingrezza

## 2023-04-04 ENCOUNTER — APPOINTMENT (INPATIENT)
Dept: NEUROLOGY | Facility: HOSPITAL | Age: 58
End: 2023-04-04

## 2023-04-04 RX ORDER — BUTALBITAL, ACETAMINOPHEN AND CAFFEINE 50; 325; 40 MG/1; MG/1; MG/1
1 TABLET ORAL ONCE
Status: COMPLETED | OUTPATIENT
Start: 2023-04-04 | End: 2023-04-04

## 2023-04-04 RX ORDER — VALBENAZINE 40 MG/1
40 CAPSULE ORAL DAILY
Qty: 14 CAPSULE | Refills: 0 | Status: SHIPPED
Start: 2023-04-04

## 2023-04-04 RX ORDER — VALBENAZINE 40 MG/1
40 CAPSULE ORAL DAILY
Qty: 14 CAPSULE | Refills: 0
Start: 2023-04-04 | End: 2023-04-04 | Stop reason: SDUPTHER

## 2023-04-04 RX ADMIN — BENZTROPINE MESYLATE 1 MG: 1 TABLET ORAL at 08:21

## 2023-04-04 RX ADMIN — PAROXETINE 30 MG: 20 TABLET, FILM COATED ORAL at 17:00

## 2023-04-04 RX ADMIN — ATORVASTATIN CALCIUM 40 MG: 40 TABLET, FILM COATED ORAL at 17:01

## 2023-04-04 RX ADMIN — PREGABALIN 50 MG: 50 CAPSULE ORAL at 08:21

## 2023-04-04 RX ADMIN — BUSPIRONE HYDROCHLORIDE 20 MG: 10 TABLET ORAL at 17:00

## 2023-04-04 RX ADMIN — LITHIUM CARBONATE 300 MG: 300 TABLET, FILM COATED, EXTENDED RELEASE ORAL at 21:20

## 2023-04-04 RX ADMIN — BUSPIRONE HYDROCHLORIDE 20 MG: 10 TABLET ORAL at 08:21

## 2023-04-04 RX ADMIN — PAROXETINE 30 MG: 20 TABLET, FILM COATED ORAL at 08:21

## 2023-04-04 RX ADMIN — FOLIC ACID 1000 MCG: 1 TABLET ORAL at 08:21

## 2023-04-04 RX ADMIN — MIRTAZAPINE 7.5 MG: 15 TABLET, FILM COATED ORAL at 17:01

## 2023-04-04 RX ADMIN — PRAZOSIN HYDROCHLORIDE 2 MG: 2 CAPSULE ORAL at 21:20

## 2023-04-04 RX ADMIN — AMLODIPINE BESYLATE 10 MG: 10 TABLET ORAL at 08:21

## 2023-04-04 RX ADMIN — QUETIAPINE FUMARATE 50 MG: 25 TABLET ORAL at 17:00

## 2023-04-04 RX ADMIN — QUETIAPINE FUMARATE 50 MG: 25 TABLET ORAL at 08:21

## 2023-04-04 RX ADMIN — VALBENAZINE 40 MG: 40 CAPSULE ORAL at 20:23

## 2023-04-04 RX ADMIN — ENOXAPARIN SODIUM 40 MG: 100 INJECTION SUBCUTANEOUS at 08:21

## 2023-04-04 RX ADMIN — PREGABALIN 50 MG: 50 CAPSULE ORAL at 17:00

## 2023-04-04 RX ADMIN — PANTOPRAZOLE SODIUM 40 MG: 40 TABLET, DELAYED RELEASE ORAL at 06:28

## 2023-04-04 RX ADMIN — FERROUS SULFATE TAB 325 MG (65 MG ELEMENTAL FE) 325 MG: 325 (65 FE) TAB at 08:21

## 2023-04-04 RX ADMIN — BUSPIRONE HYDROCHLORIDE 20 MG: 10 TABLET ORAL at 21:19

## 2023-04-04 RX ADMIN — BUTALBITAL, ACETAMINOPHEN AND CAFFEINE 1 TABLET: 50; 325; 40 TABLET ORAL at 10:57

## 2023-04-04 RX ADMIN — BENZTROPINE MESYLATE 1 MG: 1 TABLET ORAL at 17:00

## 2023-04-04 RX ADMIN — PAROXETINE 60 MG: 20 TABLET, FILM COATED ORAL at 21:18

## 2023-04-04 RX ADMIN — ASPIRIN 81 MG: 81 TABLET, COATED ORAL at 08:21

## 2023-04-04 RX ADMIN — QUETIAPINE FUMARATE 50 MG: 25 TABLET ORAL at 21:19

## 2023-04-04 NOTE — PLAN OF CARE
Problem: OCCUPATIONAL THERAPY ADULT  Goal: Performs self-care activities at highest level of function for planned discharge setting  See evaluation for individualized goals  Description: Treatment Interventions: ADL retraining, Functional transfer training, UE strengthening/ROM, Endurance training, Cognitive reorientation, Patient/family training, Compensatory technique education, Continued evaluation          See flowsheet documentation for full assessment, interventions and recommendations  Outcome: Progressing  Note: Limitation: Decreased ADL status, Decreased UE strength, Decreased Safe judgement during ADL, Decreased cognition, Decreased endurance, Decreased high-level ADLs  Prognosis: Fair  Assessment: Patient participated in skilled OT session this date with interventions consisting of therapeutic activities and self-care/ADL training to increase B UE strength, functional endurance/activity tolerance, static/dynamic sitting/standing balance, and overall safety awareness to increase (I) with ADLs/IADLs to return to PLOF  Provided education on energy conservation techniques, deep breathing techniques, fall prevention strategies, and overall safety awareness  Patient agreeable to OT treatment session, upon arrival patient was found supine in bed  Patient requiring verbal cues for safety and verbal cues for pacing through activity steps  Please refer to flowsheet for functional performance  Due to fall, obtained orthostatics: supine - 121/78, EOB - 114/75, standing - 78/51 however pt moving arm despite asking to stay still  Denies dizziness/lightheadedness  Repeat EOB - 111/77 and 110/68 standing  Did notify RN Mikayla Velazquez and PTA Varsha  Patient continues to be functioning below baseline level, occupational performance remains limited secondary to factors listed above and increased risk for falls and injury  Pt left supine in bed, alarm armed and call bell and bedside table within reach; all needs met   Pt continues to make progress towards POC, possibly change of D/C disposition to home with [place] pending steps with PT      OT Discharge Recommendation: Home with home health rehabilitation (and 24/7 S - pending stairs)

## 2023-04-04 NOTE — ASSESSMENT & PLAN NOTE
Background: Patient presented to the ED following multiple episodes of vomiting at home found to have high-grade small bowel obstruction  · Was followed by surgery  NG tube was unsuccessful in placement    · Eventually diet was started and tolerated  · Repeat imaging subsequently demonstrated contrast in the right colon and has had bowel movements  · Disposition: Initially awaiting rehab placement but now cleared for discharge home

## 2023-04-04 NOTE — ASSESSMENT & PLAN NOTE
· Mood stable    · Continue home regimen of quetiapine 50 mg 3 times daily, lithium 300 mg at bedtime, paroxetine 30 mg twice daily and 60 mg at bedtime, and buspirone

## 2023-04-04 NOTE — UTILIZATION REVIEW
Continued Stay Review    Date: 4/3/23                          Current Patient Class: inpatient  Current Level of Care: med surg    HPI:57 y o  female initially admitted on 3/24/23     Assessment/Plan:  Repeat imaging subsequently demonstrated contrast in the right colon and has had bowel movements  Pt seen by neurology dt seizure-like activity and awaiting EEG but suspicion is nonepileptic and Keppra has been discontinued  Continue home meds      Vital Signs:    Date/Time Temp Pulse Resp BP MAP (mmHg) SpO2 O2 Device Patient Position - Orthostatic VS   04/04/23 07:43:38 97 5 °F (36 4 °C) 89 18 130/77 95 97 % None (Room air) Sitting   04/03/23 23:55:34 98 6 °F (37 °C) 67 20 99/70 80 -- None (Room air) Lying   04/03/23 23:55:05 98 6 °F (37 °C) 67 -- -- -- 92 % -- --   04/03/23 21:12:03 -- 65 -- 116/77 90 97 % -- --   04/03/23 15:27:43 98 2 °F (36 8 °C) 81 24 Abnormal  141/89 106 98 % None (Room air) Lying   04/03/23 07:52:04 97 7 °F (36 5 °C) 67 17 117/72 87 99 % None (Room air) Lying   04/02/23 23:29:59 98 1 °F (36 7 °C) 68 18 114/71 85 96 % None (Room air) --   04/02/23 21:15:57 -- 67 -- 119/73 88 96 % -- --   04/02/23 16:52:44 98 6 °F (37 °C) 70 16 120/80 93 97 % None (Room air) Lying   04/02/23 07:53:41 -- 85 16 122/78 93 97 % -- --       Pertinent Labs/Diagnostic Results:   Results from last 7 days   Lab Units 03/31/23  1404   SARS-COV-2  Negative     Results from last 7 days   Lab Units 04/02/23  0520 04/01/23  1319 03/30/23  0443 03/29/23  0502   WBC Thousand/uL 4 60 6 17 3 80* 3 70*   HEMOGLOBIN g/dL 12 2 13 9 10 3* 11 3*   HEMATOCRIT % 38 8 43 2 31 8* 34 6*   PLATELETS Thousands/uL 304 325 265 264   NEUTROS ABS Thousands/µL 2 44  --   --  1 67*   BANDS PCT %  --   --  1  --          Results from last 7 days   Lab Units 04/02/23  0520 04/01/23  1319 04/01/23  0520 03/31/23  0533 03/30/23  0443 03/29/23  0502   SODIUM mmol/L 140 139  --  141 144 142   POTASSIUM mmol/L 4 0 3 9 3 6 3 4* 3 4* 3 5   CHLORIDE mmol/L 109* 104  --  106 115* 110*   CO2 mmol/L 24 24  --  27 23 25   ANION GAP mmol/L 7 11  --  8 6 7   BUN mg/dL 7 9  --  6 2* 2*   CREATININE mg/dL 0 72 1 03  --  0 82 0 68 0 62   EGFR ml/min/1 73sq m 91 59  --  78 96 99   CALCIUM mg/dL 8 5 8 7  --  8 0* 7 3* 8 1*   MAGNESIUM mg/dL 2 5 1 7*  --   --   --  2 1     Results from last 7 days   Lab Units 03/29/23  0502   AST U/L 9*   ALT U/L 7   ALK PHOS U/L 51   TOTAL PROTEIN g/dL 5 8*   ALBUMIN g/dL 3 4*   TOTAL BILIRUBIN mg/dL 0 98     Results from last 7 days   Lab Units 04/01/23  1304   POC GLUCOSE mg/dl 109     Results from last 7 days   Lab Units 04/02/23  0520 04/01/23  1319 03/31/23  0533 03/30/23  0443 03/29/23  0502   GLUCOSE RANDOM mg/dL 76 104 85 68 75     Results from last 7 days   Lab Units 04/01/23  1319   CK TOTAL U/L 57     Results from last 7 days   Lab Units 04/01/23  1754 04/01/23  1319   LACTIC ACID mmol/L 0 9 4 6*     Results from last 7 days   Lab Units 04/01/23  1319   PROLACTIN ng/mL 60 8     Results from last 7 days   Lab Units 03/31/23  1404   INFLUENZA A PCR  Negative   INFLUENZA B PCR  Negative   RSV PCR  Negative     Results from last 7 days   Lab Units 03/30/23  0443   TOTAL COUNTED  100     Medications:   Scheduled Medications:  amLODIPine, 10 mg, Oral, Daily  aspirin, 81 mg, Oral, Daily  atorvastatin, 40 mg, Oral, After Dinner  benztropine, 1 mg, Oral, BID  busPIRone, 20 mg, Oral, TID  enoxaparin, 40 mg, Subcutaneous, Q24H KESHIA  ferrous sulfate, 325 mg, Oral, Daily With Breakfast  folic acid, 9,459 mcg, Oral, Daily  lithium carbonate, 300 mg, Oral, HS  mirtazapine, 7 5 mg, Oral, QPM  pantoprazole, 40 mg, Oral, Daily Before Breakfast  PARoxetine, 30 mg, Oral, BID  PARoxetine, 60 mg, Oral, HS  prazosin, 2 mg, Oral, HS  pregabalin, 50 mg, Oral, BID  QUEtiapine, 50 mg, Oral, TID  Valbenazine Tosylate, 40 mg, Oral, Daily      Continuous IV Infusions: none     PRN Meds:  acetaminophen, 650 mg, Oral, Q6H PRN  aluminum-magnesium hydroxide-simethicone, 30 mL, Oral, Q6H PRN  HYDROmorphone, 0 5 mg, Intravenous, Q4H PRN  hydrOXYzine HCL, 50 mg, Oral, TID PRN  LORazepam, 2 mg, Intravenous, Once PRN  LORazepam, 0 5 mg, Oral, BID PRN  ondansetron, 4 mg, Intravenous, Q6H PRN  oxyCODONE, 5 mg, Oral, Q4H PRN        Discharge Plan: tbd, awaiting rehab placement, CM following  Network Utilization Review Department  ATTENTION: Please call with any questions or concerns to [phone] and carefully listen to the prompts so that you are directed to the right person  All voicemails are confidential   Jasmeet Berg all requests for admission clinical reviews, approved or denied determinations and any other requests to dedicated fax number below belonging to the campus where the patient is receiving treatment   List of dedicated fax numbers for the Facilities:  [address] DENIALS (Administrative/Medical Necessity) [phone]   [address] (Maternity/NICU/Pediatrics) [phone]   0 Kaitlynn Mendes [phone]   [facility] 77 [phone]   [address] [place] [address] Kasey Todd [place] 28 [phone]   1552 [facility] Dia Joseph [facility] 134 815 [facility] [phone]

## 2023-04-04 NOTE — ASSESSMENT & PLAN NOTE
· Chronic tar dive dyskinesia follows with psychiatry and neurology as an outpatient  · Continue benztropine    Attempting to refill ingrezza

## 2023-04-04 NOTE — PLAN OF CARE
Problem: PHYSICAL THERAPY ADULT  Goal: Performs mobility at highest level of function for planned discharge setting  See evaluation for individualized goals  Description: Treatment/Interventions: Functional transfer training, LE strengthening/ROM, Elevations, Therapeutic exercise, Endurance training, Patient/family training, Bed mobility, Gait training, Continued evaluation, Spoke to nursing, OT          See flowsheet documentation for full assessment, interventions and recommendations  Outcome: Progressing  Note: Prognosis: Fair  Problem List: Impaired balance, Decreased mobility, Decreased coordination  Assessment: Pt  reported dizziness with position changes and durign stair negotiation  BP readings as follows : Supine 114/85, seated EOB 94/75, standing 120/75 and post amb  in sitting 116/78 mmHg  pt  Spo2 stats remained in high 97-99% on RA with activity and at rest  Pt  progressing well with overall mobility  No physical assist provided for transfers  multiple supine to sit and back and STS transfers performed with mod I and use of bedrails  Though no LOB unsteady gait  pt  reported her spine stimulator is not on since her tremors gets worse with it on  Discussed patient's progress with OTR and agreeable pt  possible DC home with family and HHPT is appropriate at this time  Pt  reported her son in law is her caretaker  Pt  back in bed with all needs within reach and bed alarm engaged at the end of session  Alma esteban to follow per PT POC during hospital stay to maximize functional mobility  Barriers to Discharge: None     PT Discharge Recommendation: Home with home health rehabilitation    See flowsheet documentation for full assessment

## 2023-04-04 NOTE — OCCUPATIONAL THERAPY NOTE
Occupational Therapy         Patient Name: Oswald Reason  QOBLL'Y Date: 4/4/2023 04/04/23 1117   OT Last Visit   OT Visit Date 04/04/23   Note Type   Note type Cancelled Session   Cancel Reasons Patient off floor/test   Additional Comments Attempted to see pt for OT tx  Pt getting EGD, will re-attempt as able       Isamar Negron

## 2023-04-04 NOTE — ASSESSMENT & PLAN NOTE
· Day of planned discharge 4/1/23 the patient had a fall and subsequently seizure-like activity which was witnessed  · Neurology consulted  History of nonepileptic seizures in the past   · Seen by neurology this admission    · EEG this admission negative and keppra has been discontinued

## 2023-04-04 NOTE — PLAN OF CARE
Problem: Potential for Falls  Goal: Patient will remain free of falls  Description: INTERVENTIONS:  - Educate patient/family on patient safety including physical limitations  - Instruct patient to call for assistance with activity   - Consult OT/PT to assist with strengthening/mobility   - Keep Call bell within reach  - Keep bed low and locked with side rails adjusted as appropriate  - Keep care items and personal belongings within reach  - Initiate and maintain comfort rounds  - Make Fall Risk Sign visible to staff  - Offer Toileting every 2 Hours, in advance of need  - Initiate/Maintain bed alarm  - Obtain necessary fall risk management equipment  - Apply yellow socks and bracelet for high fall risk patients  - Consider moving patient to room near nurses station  Outcome: Progressing     Problem: PAIN - ADULT  Goal: Verbalizes/displays adequate comfort level or baseline comfort level  Description: Interventions:  - Encourage patient to monitor pain and request assistance  - Assess pain using appropriate pain scale  - Administer analgesics based on type and severity of pain and evaluate response  - Implement non-pharmacological measures as appropriate and evaluate response  - Consider cultural and social influences on pain and pain management  - Notify physician/advanced practitioner if interventions unsuccessful or patient reports new pain  Outcome: Progressing     Problem: INFECTION - ADULT  Goal: Absence or prevention of progression during hospitalization  Description: INTERVENTIONS:  - Assess and monitor for signs and symptoms of infection  - Monitor lab/diagnostic results  - Monitor all insertion sites, i e  indwelling lines, tubes, and drains  - Monitor endotracheal if appropriate and nasal secretions for changes in amount and color  - [place] appropriate cooling/warming therapies per order  - Administer medications as ordered  - Instruct and encourage patient and family to use good hand hygiene technique  - Identify and instruct in appropriate isolation precautions for identified infection/condition  Outcome: Progressing     Problem: SAFETY ADULT  Goal: Patient will remain free of falls  Description: INTERVENTIONS:  - Educate patient/family on patient safety including physical limitations  - Instruct patient to call for assistance with activity   - Consult OT/PT to assist with strengthening/mobility   - Keep Call bell within reach  - Keep bed low and locked with side rails adjusted as appropriate  - Keep care items and personal belongings within reach  - Initiate and maintain comfort rounds  - Make Fall Risk Sign visible to staff  - Offer Toileting every 2 Hours, in advance of need  - Initiate/Maintain bed alarm  - Obtain necessary fall risk management equipment  - Apply yellow socks and bracelet for high fall risk patients  - Consider moving patient to room near nurses station  Outcome: Progressing  Goal: Maintain or return to baseline ADL function  Description: INTERVENTIONS:  -  Assess patient's ability to carry out ADLs; assess patient's baseline for ADL function and identify physical deficits which impact ability to perform ADLs (bathing, care of mouth/teeth, toileting, grooming, dressing, etc )  - Assess/evaluate cause of self-care deficits   - Assess range of motion  - Assess patient's mobility; develop plan if impaired  - Assess patient's need for assistive devices and provide as appropriate  - Encourage maximum independence but intervene and supervise when necessary  - Involve family in performance of ADLs  - Assess for home care needs following discharge   - Consider OT consult to assist with ADL evaluation and planning for discharge  - Provide patient education as appropriate  Outcome: Progressing  Goal: Maintains/Returns to pre admission functional level  Description: INTERVENTIONS:  - Perform BMAT or MOVE assessment daily    - Set and communicate daily mobility goal to care team and patient/family/caregiver  - Collaborate with rehabilitation services on mobility goals if consulted  - Perform Range of Motion   - Reposition patient every 2 hours  - Dangle patient  - Stand patient   - Ambulate patient   - Out of bed to chair   - Out of bed for meals   - Out of bed for toileting  - Record patient progress and toleration of activity level   Outcome: Progressing     Problem: DISCHARGE PLANNING  Goal: Discharge to home or other facility with appropriate resources  Description: INTERVENTIONS:  - Identify barriers to discharge w/patient and caregiver  - Arrange for needed discharge resources and transportation as appropriate  - Identify discharge learning needs (meds, wound care, etc )  - Arrange for interpretive services to assist at discharge as needed  - Refer to Case Management Department for coordinating discharge planning if the patient needs post-hospital services based on physician/advanced practitioner order or complex needs related to functional status, cognitive ability, or social support system  Outcome: Progressing     Problem: Knowledge Deficit  Goal: Patient/family/caregiver demonstrates understanding of disease process, treatment plan, medications, and discharge instructions  Description: Complete learning assessment and assess knowledge base    Interventions:  - Provide teaching at level of understanding  - Provide teaching via preferred learning methods  Outcome: Progressing     Problem: MOBILITY - ADULT  Goal: Maintain or return to baseline ADL function  Description: INTERVENTIONS:  -  Assess patient's ability to carry out ADLs; assess patient's baseline for ADL function and identify physical deficits which impact ability to perform ADLs (bathing, care of mouth/teeth, toileting, grooming, dressing, etc )  - Assess/evaluate cause of self-care deficits   - Assess range of motion  - Assess patient's mobility; develop plan if impaired  - Assess patient's need for assistive devices and provide as appropriate  - Encourage maximum independence but intervene and supervise when necessary  - Involve family in performance of ADLs  - Assess for home care needs following discharge   - Consider OT consult to assist with ADL evaluation and planning for discharge  - Provide patient education as appropriate  Outcome: Progressing  Goal: Maintains/Returns to pre admission functional level  Description: INTERVENTIONS:  - Perform BMAT or MOVE assessment daily    - Set and communicate daily mobility goal to care team and patient/family/caregiver  - Collaborate with rehabilitation services on mobility goals if consulted  - Perform Range of Motion   - Reposition patient every 2 hours  - Dangle patient   - Stand patient   - Ambulate patient   - Out of bed to chair   - Out of bed for meals   - Out of bed for toileting  - Record patient progress and toleration of activity level   Outcome: Progressing     Problem: Nutrition/Hydration-ADULT  Goal: Nutrient/Hydration intake appropriate for improving, restoring or maintaining nutritional needs  Description: Monitor and assess patient's nutrition/hydration status for malnutrition  Collaborate with interdisciplinary team and initiate plan and interventions as ordered  Monitor patient's weight and dietary intake as ordered or per policy  Utilize nutrition screening tool and intervene as necessary  Determine patient's food preferences and provide high-protein, high-caloric foods as appropriate       INTERVENTIONS:  - Monitor oral intake, urinary output, labs, and treatment plans  - Assess nutrition and hydration status and recommend course of action  - Evaluate amount of meals eaten  - Assist patient with eating if necessary   - Allow adequate time for meals  - Recommend/ encourage appropriate diets, oral nutritional supplements, and vitamin/mineral supplements  - Order, calculate, and assess calorie counts as needed  - Recommend, monitor, and adjust tube feedings and TPN/PPN based on assessed needs  - Assess need for intravenous fluids  - Provide specific nutrition/hydration education as appropriate  - Include patient/family/caregiver in decisions related to nutrition  Outcome: Progressing     Problem: NEUROSENSORY - ADULT  Goal: Achieves stable or improved neurological status  Description: INTERVENTIONS  - Monitor and report changes in neurological status  - Monitor vital signs such as temperature, blood pressure, glucose, and any other labs ordered   - Initiate measures to prevent increased intracranial pressure  - Monitor for seizure activity and implement precautions if appropriate      Outcome: Progressing  Goal: Remains free of injury related to seizures activity  Description: INTERVENTIONS  - Maintain airway, patient safety  and administer oxygen as ordered  - Monitor patient for seizure activity, document and report duration and description of seizure to physician/advanced practitioner  - If seizure occurs,  ensure patient safety during seizure  - Reorient patient post seizure  - Seizure pads on all 4 side rails  - Instruct patient/family to notify RN of any seizure activity including if an aura is experienced  - Instruct patient/family to call for assistance with activity based on nursing assessment  - Administer anti-seizure medications if ordered    Outcome: Progressing

## 2023-04-04 NOTE — PHYSICAL THERAPY NOTE
Physical Therapy Treatment Note     04/04/23 1344   PT Last Visit   PT Visit Date 04/04/23   Note Type   Note Type Treatment   Pain Assessment   Pain Assessment Tool 0-10   Pain Score No Pain   Restrictions/Precautions   Weight Bearing Precautions Per Order No   Other Precautions Bed Alarm; Fall Risk;Visual impairment;Telemetry   General   Chart Reviewed Yes   Subjective   Subjective Pt  agreeable to PT  Attempted multiple times however patient was busy then and able to participate on third attempt   Bed Mobility   Supine to Sit 6  Modified independent   Additional items HOB elevated; Bedrails   Sit to Supine 6  Modified independent   Additional items HOB elevated; Bedrails   Transfers   Sit to Stand 5  Supervision   Additional items Assist x 1   Stand to Sit 5  Supervision   Additional items Assist x 1   Stand pivot 5  Supervision   Additional items Assist x 1   Ambulation/Elevation   Gait pattern Forward Flexion;Narrow CALLY; Inconsistent zack; Foward flexed; Short stride; Excessively slow;Decreased heel strike;Decreased toe off   Gait Assistance   (CGA/CS)   Additional items Assist x 1   Assistive Device Rolling walker   Distance 60ft   Stair Management Assistance   (CGA/CS)   Additional items Assist x 1; Increased time required   Stair Management Technique One rail R;Step to pattern; Sideways;Nonreciprocal   Number of Stairs 5   Balance   Static Sitting Good   Dynamic Sitting Fair +   Static Standing Fair   Dynamic Standing Fair -   Ambulatory Fair -   Endurance Deficit   Endurance Deficit No   Activity Tolerance   Activity Tolerance Patient tolerated treatment well   Nurse Made Aware Yes   Exercises   THR Supine;Sitting;Bilateral;10 reps;AROM   Balance training  Repeated STS x 10 with no UE or 1UE support   Assessment   Prognosis Fair   Problem List Impaired balance;Decreased mobility; Decreased coordination   Assessment Pt  reported dizziness with position changes and durign stair negotiation   BP readings as follows : Supine 114/85, seated EOB 94/75, standing 120/75 and post amb  in sitting 116/78 mmHg  pt  Spo2 stats remained in high 97-99% on RA with activity and at rest  Pt  progressing well with overall mobility  No physical assist provided for transfers  multiple supine to sit and back and STS transfers performed with mod I and use of bedrails  Though no LOB unsteady gait  pt  reported her spine stimulator is not on since her tremors gets worse with it on  Discussed patient's progress with OTR and agreeable pt  possible DC home with family and HHPT is appropriate at this time  Pt  reported her son in law is her caretaker  Pt  back in bed with all needs within reach and bed alarm engaged at the end of session  Kiah Moreno eulalia to follow per PT POC during hospital stay to maximize functional mobility  Barriers to Discharge None   Goals   Patient Goals None reported   STG Expiration Date 04/07/23   PT Treatment Day 3   Plan   Treatment/Interventions Functional transfer training;LE strengthening/ROM; Elevations; Therapeutic exercise; Endurance training;Gait training;Bed mobility; Equipment eval/education;Patient/family training;Spoke to nursing;Spoke to case management;OT   Progress Progressing toward goals   PT Frequency 3-5x/wk   Recommendation   PT Discharge Recommendation Home with home health rehabilitation   Equipment Recommended [address] Mobility Inpatient   Turning in Flat Bed Without Bedrails 4   Lying on Back to Sitting on Edge of Flat Bed Without Bedrails 4   Moving Bed to Chair 4   Standing Up From Chair Using Arms 4   Walk in Room 3   Climb 3-5 Stairs With Railing 3   Basic Mobility Inpatient Raw Score 22   Basic Mobility Standardized Score 47 4   Highest Level Of Mobility   JH-HLM Goal 7: Walk 25 feet or more   JH-HLM Achieved 7: Walk 25 feet or more   End of Consult   Patient Position at End of Consult Supine; All needs within reach;Bed/Chair alarm activated           SHERLY Calzada AM-PAC basic mobility standardized score less than 42 9 suggest the patient may benefit from discharge to post-acute rehab services

## 2023-04-04 NOTE — PROGRESS NOTES
[address] [place] [address]  Progress Note  Name: Darion Watts  MRN: [id number]  Unit/Bed#: E5 -01 I Date of Admission: 3/23/2023   Date of Service: 4/4/2023 I Hospital Day: 11    Assessment/Plan   * Small bowel obstruction [facility])  Assessment & Plan  Background: Patient presented to the ED following multiple episodes of vomiting at home found to have high-grade small bowel obstruction  · Was followed by surgery  NG tube was unsuccessful in placement  · Eventually diet was started and tolerated  · Repeat imaging subsequently demonstrated contrast in the right colon and has had bowel movements  · Disposition: Initially awaiting rehab placement but now cleared for discharge home    Witnessed seizure-like activity [facility])  Assessment & Plan  · Day of planned discharge 4/1/23 the patient had a fall and subsequently seizure-like activity which was witnessed  · Neurology consulted  History of nonepileptic seizures in the past   · Seen by neurology this admission  · EEG this admission negative and keppra has been discontinued    Hypokalemia  Assessment & Plan  · Repleted    Mixed hyperlipidemia  Assessment & Plan  · Continue atorvastatin    Tardive dyskinesia  Assessment & Plan  · Chronic tar dive dyskinesia follows with psychiatry and neurology as an outpatient  · Continue benztropine  Attempting to refill ingrezza    Essential hypertension  Assessment & Plan  · Blood pressure stable on amlodipine    Bipolar disorder (HCC)  Assessment & Plan  · Mood stable  · Continue home regimen of quetiapine 50 mg 3 times daily, lithium 300 mg at bedtime, paroxetine 30 mg twice daily and 60 mg at bedtime, and buspirone    Chronic pain disorder  Assessment & Plan  · Continue pregabalin 50 mg        VTE Pharmacologic Prophylaxis:  Moderate Risk (Score 3-4) - Pharmacological DVT Prophylaxis Ordered: enoxaparin (Lovenox)      Patient Centered Rounds: I have performed bedside rounds with nursing staff "today  Discussions with Specialists or Other Care Team Provider: Case management and neurology    Education and Discussions with Family / Patient: Updated  (daughter) via phone  Time Spent for Care: This time was spent on one or more of the following: performing physical exam; counseling and coordination of care; obtaining or reviewing history; documenting in the medical record; reviewing/ordering tests, medications or procedures; communicating with other healthcare professionals and discussing with patient's family/caregivers  Current Length of Stay: 11 day(s)  Current Patient Status: Inpatient   Certification Statement: The patient will continue to require additional inpatient hospital stay due to PT reevaluation as patient still feels wobbly  Discharge Plan: Anticipate discharge tomorrow to home with home services  Code Status: Level 1 - Full Code      Subjective:   Patient seen and examined  Still feeling a bit unsteady  Objective:   Vitals: Blood pressure 130/85, pulse 83, temperature 100 °F (37 8 °C), temperature source Oral, resp  rate (!) 24, height 5' 1\" (1 549 m), weight 72 5 kg (159 lb 13 3 oz), SpO2 98 %, not currently breastfeeding  Intake/Output Summary (Last 24 hours) at 4/4/2023 1831  Last data filed at 4/4/2023 1219  Gross per 24 hour   Intake 536 ml   Output --   Net 536 ml       Physical Exam  Vitals reviewed  Constitutional:       General: She is not in acute distress  Appearance: Normal appearance  HENT:      Head: Atraumatic  Cardiovascular:      Rate and Rhythm: Regular rhythm  Heart sounds: Normal heart sounds  Pulmonary:      Breath sounds: Decreased breath sounds present  No wheezing  Abdominal:      General: Bowel sounds are normal       Palpations: Abdomen is soft  Tenderness: There is no guarding or rebound  Musculoskeletal:         General: No swelling  Skin:     General: Skin is warm     Neurological:      Mental Status: She " is alert  Comments: Tar dive dyskinesia   Psychiatric:         Mood and Affect: Mood normal        Additional Data:   Labs:  Results from last 7 days   Lab Units 04/02/23  0520 04/01/23  1319 03/30/23  0443   WBC Thousand/uL 4 60 6 17 3 80*   HEMOGLOBIN g/dL 12 2 13 9 10 3*   PLATELETS Thousands/uL 304 325 265   MCV fL 95 92 92   TOTAL NEUT ABS Thousand/uL  --  2 65 1 56*   BANDS PCT %  --   --  1     Results from last 7 days   Lab Units 04/02/23  0520 04/01/23  1319 04/01/23  0520 03/31/23  0533 03/30/23  0443 03/29/23  0502   SODIUM mmol/L 140 139  --  141   < > 142   POTASSIUM mmol/L 4 0 3 9 3 6 3 4*   < > 3 5   CHLORIDE mmol/L 109* 104  --  106   < > 110*   CO2 mmol/L 24 24  --  27   < > 25   ANION GAP mmol/L 7 11  --  8   < > 7   BUN mg/dL 7 9  --  6   < > 2*   CREATININE mg/dL 0 72 1 03  --  0 82   < > 0 62   CALCIUM mg/dL 8 5 8 7  --  8 0*   < > 8 1*   ALBUMIN g/dL  --   --   --   --   --  3 4*   TOTAL BILIRUBIN mg/dL  --   --   --   --   --  0 98   ALK PHOS U/L  --   --   --   --   --  51   ALT U/L  --   --   --   --   --  7   AST U/L  --   --   --   --   --  9*   EGFR ml/min/1 73sq m 91 59  --  78   < > 99   GLUCOSE RANDOM mg/dL 76 104  --  85   < > 75    < > = values in this interval not displayed  Results from last 7 days   Lab Units 04/02/23  0520 04/01/23  1319 03/29/23  0502   MAGNESIUM mg/dL 2 5 1 7* 2 1     Results from last 7 days   Lab Units 04/01/23  1319   CK TOTAL U/L 57              Results from last 7 days   Lab Units 04/01/23  1754 04/01/23  1319   LACTIC ACID mmol/L 0 9 4 6*     Results from last 7 days   Lab Units 04/01/23  1304   POC GLUCOSE mg/dl 109             * I Have Reviewed All Lab Data Listed Above      Cultures:   Results from last 7 days   Lab Units 03/31/23  1404   INFLUENZA A PCR  Negative       Results from last 7 days   Lab Units 03/31/23  1404   SARS-COV-2  Negative   INFLUENZA A PCR  Negative   INFLUENZA B PCR  Negative   RSV PCR  Negative Lines/Drains:  Invasive Devices     Peripheral Intravenous Line  Duration           Peripheral IV 04/01/23 Distal;Right;Upper;Ventral (anterior) Arm 3 days              Telemetry:      Imaging:  Imaging Reports Reviewed Today Include:     CT abdomen pelvis w contrast    Result Date: 3/28/2023  Impression: Findings suggestive of ileus versus partial small bowel obstruction  Partially decompressed distal small bowel loops without evidence of discrete transition point   Workstation performed: [id number]       Scheduled Meds:  Current Facility-Administered Medications   Medication Dose Route Frequency Provider Last Rate   • acetaminophen  650 mg Oral Q6H PRN Bety Wright PA-C     • aluminum-magnesium hydroxide-simethicone  30 mL Oral Q6H PRN Betyjamie Wright PA-C     • amLODIPine  10 mg Oral Daily Bety Wright PA-C     • aspirin  81 mg Oral Daily Bety Wright PA-C     • atorvastatin  40 mg Oral After Darlirandi Cavazos PA-C     • benztropine  1 mg Oral BID Bety Wright PA-C     • busPIRone  20 mg Oral TID Bety Wright PA-C     • enoxaparin  40 mg Subcutaneous Q24H [address] Tom Woodward MD     • ferrous sulfate  325 mg Oral Daily With Breakfast Bety Wright PA-C     • folic acid  2,805 mcg Oral Daily Bety Wright PA-C     • HYDROmorphone  0 5 mg Intravenous Q4H PRN Tom Woodward MD     • hydrOXYzine HCL  50 mg Oral TID PRN Bety Wright PA-C     • lithium carbonate  300 mg Oral HS Bety Wright PA-C     • LORazepam  2 mg Intravenous Once PRN Moe Soriano MD     • LORazepam  0 5 mg Oral BID PRN Bety Wright PA-C     • mirtazapine  7 5 mg Oral QPM Bety Wright PA-C     • ondansetron  4 mg Intravenous Q6H PRN Bety Wright PA-C     • oxyCODONE  5 mg Oral Q4H PRN Tom Woodward MD     • pantoprazole  40 mg Oral Daily Before Breakfast Moe Soriano MD     • PARoxetine  30 mg Oral BID Bety Wright PA-C     • PARoxetine  60 mg Oral HS Mary Adams Dipika Pollard PA-C     • prazosin  2 mg Oral HS Eder Coles PA-C     • pregabalin  50 mg Oral BID Eder Coles PA-C     • QUEtiapine  50 mg Oral TID Eder Coles PA-C     • Valbenazine Tosylate  40 mg Oral Daily Maddy Hsu DO         Today, Patient Was Seen By: Maddy Hsu DO    ** Please Note: Dictation voice to text software may have been used in the creation of this document   **

## 2023-04-04 NOTE — PLAN OF CARE
Problem: Potential for Falls  Goal: Patient will remain free of falls  Description: INTERVENTIONS:  - Educate patient/family on patient safety including physical limitations  - Instruct patient to call for assistance with activity   - Consult OT/PT to assist with strengthening/mobility   - Keep Call bell within reach  - Keep bed low and locked with side rails adjusted as appropriate  - Keep care items and personal belongings within reach  - Initiate and maintain comfort rounds  - Make Fall Risk Sign visible to staff  - Offer Toileting every 2 Hours, in advance of need  - Initiate/Maintain bed alarm  - Obtain necessary fall risk management equipment  - Apply yellow socks and bracelet for high fall risk patients  - Consider moving patient to room near nurses station  Outcome: Progressing     Problem: PAIN - ADULT  Goal: Verbalizes/displays adequate comfort level or baseline comfort level  Description: Interventions:  - Encourage patient to monitor pain and request assistance  - Assess pain using appropriate pain scale  - Administer analgesics based on type and severity of pain and evaluate response  - Implement non-pharmacological measures as appropriate and evaluate response  - Consider cultural and social influences on pain and pain management  - Notify physician/advanced practitioner if interventions unsuccessful or patient reports new pain  Outcome: Progressing     Problem: INFECTION - ADULT  Goal: Absence or prevention of progression during hospitalization  Description: INTERVENTIONS:  - Assess and monitor for signs and symptoms of infection  - Monitor lab/diagnostic results  - Monitor all insertion sites, i e  indwelling lines, tubes, and drains  - Monitor endotracheal if appropriate and nasal secretions for changes in amount and color  - [place] appropriate cooling/warming therapies per order  - Administer medications as ordered  - Instruct and encourage patient and family to use good hand hygiene technique  - Identify and instruct in appropriate isolation precautions for identified infection/condition  Outcome: Progressing     Problem: SAFETY ADULT  Goal: Patient will remain free of falls  Description: INTERVENTIONS:  - Educate patient/family on patient safety including physical limitations  - Instruct patient to call for assistance with activity   - Consult OT/PT to assist with strengthening/mobility   - Keep Call bell within reach  - Keep bed low and locked with side rails adjusted as appropriate  - Keep care items and personal belongings within reach  - Initiate and maintain comfort rounds  - Make Fall Risk Sign visible to staff  - Offer Toileting every 2 Hours, in advance of need  - Initiate/Maintain bed alarm  - Obtain necessary fall risk management equipment  - Apply yellow socks and bracelet for high fall risk patients  - Consider moving patient to room near nurses station  Outcome: Progressing  Goal: Maintain or return to baseline ADL function  Description: INTERVENTIONS:  -  Assess patient's ability to carry out ADLs; assess patient's baseline for ADL function and identify physical deficits which impact ability to perform ADLs (bathing, care of mouth/teeth, toileting, grooming, dressing, etc )  - Assess/evaluate cause of self-care deficits   - Assess range of motion  - Assess patient's mobility; develop plan if impaired  - Assess patient's need for assistive devices and provide as appropriate  - Encourage maximum independence but intervene and supervise when necessary  - Involve family in performance of ADLs  - Assess for home care needs following discharge   - Consider OT consult to assist with ADL evaluation and planning for discharge  - Provide patient education as appropriate  Outcome: Progressing  Goal: Maintains/Returns to pre admission functional level  Description: INTERVENTIONS:  - Perform BMAT or MOVE assessment daily    - Set and communicate daily mobility goal to care team and patient/family/caregiver  - Collaborate with rehabilitation services on mobility goals if consulted  - Out of bed for toileting  - Record patient progress and toleration of activity level   Outcome: Progressing     Problem: DISCHARGE PLANNING  Goal: Discharge to home or other facility with appropriate resources  Description: INTERVENTIONS:  - Identify barriers to discharge w/patient and caregiver  - Arrange for needed discharge resources and transportation as appropriate  - Identify discharge learning needs (meds, wound care, etc )  - Arrange for interpretive services to assist at discharge as needed  - Refer to Case Management Department for coordinating discharge planning if the patient needs post-hospital services based on physician/advanced practitioner order or complex needs related to functional status, cognitive ability, or social support system  Outcome: Progressing     Problem: Knowledge Deficit  Goal: Patient/family/caregiver demonstrates understanding of disease process, treatment plan, medications, and discharge instructions  Description: Complete learning assessment and assess knowledge base    Interventions:  - Provide teaching at level of understanding  - Provide teaching via preferred learning methods  Outcome: Progressing     Problem: MOBILITY - ADULT  Goal: Maintain or return to baseline ADL function  Description: INTERVENTIONS:  -  Assess patient's ability to carry out ADLs; assess patient's baseline for ADL function and identify physical deficits which impact ability to perform ADLs (bathing, care of mouth/teeth, toileting, grooming, dressing, etc )  - Assess/evaluate cause of self-care deficits   - Assess range of motion  - Assess patient's mobility; develop plan if impaired  - Assess patient's need for assistive devices and provide as appropriate  - Encourage maximum independence but intervene and supervise when necessary  - Involve family in performance of ADLs  - Assess for home care needs following discharge   - Consider OT consult to assist with ADL evaluation and planning for discharge  - Provide patient education as appropriate  Outcome: Progressing  Goal: Maintains/Returns to pre admission functional level  Description: INTERVENTIONS:  - Perform BMAT or MOVE assessment daily    - Set and communicate daily mobility goal to care team and patient/family/caregiver  - Collaborate with rehabilitation services on mobility goals if consulted  - Out of bed for toileting  - Record patient progress and toleration of activity level   Outcome: Progressing     Problem: Nutrition/Hydration-ADULT  Goal: Nutrient/Hydration intake appropriate for improving, restoring or maintaining nutritional needs  Description: Monitor and assess patient's nutrition/hydration status for malnutrition  Collaborate with interdisciplinary team and initiate plan and interventions as ordered  Monitor patient's weight and dietary intake as ordered or per policy  Utilize nutrition screening tool and intervene as necessary  Determine patient's food preferences and provide high-protein, high-caloric foods as appropriate       INTERVENTIONS:  - Monitor oral intake, urinary output, labs, and treatment plans  - Assess nutrition and hydration status and recommend course of action  - Evaluate amount of meals eaten  - Assist patient with eating if necessary   - Allow adequate time for meals  - Recommend/ encourage appropriate diets, oral nutritional supplements, and vitamin/mineral supplements  - Order, calculate, and assess calorie counts as needed  - Recommend, monitor, and adjust tube feedings and TPN/PPN based on assessed needs  - Assess need for intravenous fluids  - Provide specific nutrition/hydration education as appropriate  - Include patient/family/caregiver in decisions related to nutrition  Outcome: Progressing     Problem: NEUROSENSORY - ADULT  Goal: Achieves stable or improved neurological status  Description: INTERVENTIONS  - Monitor and report changes in neurological status  - Monitor vital signs such as temperature, blood pressure, glucose, and any other labs ordered   - Initiate measures to prevent increased intracranial pressure  - Monitor for seizure activity and implement precautions if appropriate      Outcome: Progressing  Goal: Remains free of injury related to seizures activity  Description: INTERVENTIONS  - Maintain airway, patient safety  and administer oxygen as ordered  - Monitor patient for seizure activity, document and report duration and description of seizure to physician/advanced practitioner  - If seizure occurs,  ensure patient safety during seizure  - Reorient patient post seizure  - Seizure pads on all 4 side rails  - Instruct patient/family to notify RN of any seizure activity including if an aura is experienced  - Instruct patient/family to call for assistance with activity based on nursing assessment  - Administer anti-seizure medications if ordered    Outcome: Progressing

## 2023-04-04 NOTE — OCCUPATIONAL THERAPY NOTE
Occupational Therapy Progress Note     Patient Name: Aquilino Lemos  [id number] Date: 4/4/2023  Problem List  Principal Problem:    Small bowel obstruction ([facility] [address] )  Active Problems:    Chronic pain disorder    [facility]    Essential hypertension    Tardive dyskinesia    Mixed hyperlipidemia    Hypokalemia    Anemia    Memory loss    Fall    Witnessed seizure-like [facility] )        04/04/23 1225   OT Last Visit   OT Visit Date 04/04/23   Note Type   Note Type Treatment   Pain Assessment   Pain Assessment Tool 0-10   Pain Score No Pain   Restrictions/Precautions   Weight Bearing Precautions Per Order No   Other Precautions Bed Alarm; Fall Risk;Visual impairment;Telemetry   ADL   Where Assessed Chair   Grooming Assistance 5  Supervision/Setup   Grooming Deficit Verbal cueing;Supervision/safety; Increased time to complete;Standing with assistive device   Grooming Comments Standing at sink with RW   UB Bathing Assistance 5  Supervision/Setup   UB Bathing Deficit Setup;Supervision/safety; Increased time to complete   UB Bathing Comments s/u in chair, seated   LB Bathing Assistance 4  Minimal Assistance   LB Bathing Deficit Setup;Verbal cueing;Supervision/safety; Increased time to complete;Right lower leg including foot; Left lower leg including foot   LB Bathing Comments Impaired functional reach, required (A) at baseline per pt   UB Dressing Assistance 5  Supervision/Setup   UB Dressing Deficit Setup;Supervision/safety; Increased time to complete   LB Dressing Assistance 3  Moderate Assistance   LB Dressing Deficit Setup; Requires assistive device for steadying;Verbal cueing;Supervision/safety; Increased time to complete; Don/doff R sock; Don/doff L sock; Thread RLE into underwear; Thread LLE into underwear   LB Dressing Comments requires (A) at basline per pt   Toileting Assistance  Other (Comment)  (CGA)   Toileting Deficit Setup;Supervison/safety; Increased time to complete   Bed Mobility   Supine to Sit 6 Modified independent   Additional items HOB elevated; Bedrails   Sit to Supine 6  Modified independent   Additional items HOB elevated; Bedrails   Transfers   Sit to Stand 5  Supervision   Additional items Assist x 1   Stand to Sit 5  Supervision   Additional items Assist x 1   Stand pivot 5  Supervision   Additional items Assist x 1   Toilet transfer 5  Supervision   Additional items Assist x 1;Standard toilet; Other  (RW)   Functional Mobility   Functional Mobility 5  Supervision   Additional items Rolling walker   Cognition   Overall Cognitive Status Impaired   Arousal/Participation Responsive; Cooperative   Attention Attends with cues to redirect   Orientation Level Oriented X4   Memory Decreased recall of precautions   Following Commands Follows one step commands without difficulty   Activity Tolerance   Activity Tolerance Patient limited by fatigue;Patient limited by pain   Medical Staff Made Aware Yes   Assessment   Assessment Patient participated in skilled OT session this date with interventions consisting of therapeutic activities and self-care/ADL training to increase B UE strength, functional endurance/activity tolerance, static/dynamic sitting/standing balance, and overall safety awareness to increase (I) with ADLs/IADLs to return to PLOF  Provided education on energy conservation techniques, deep breathing techniques, fall prevention strategies, and overall safety awareness  Patient agreeable to OT treatment session, upon arrival patient was found supine in bed  Patient requiring verbal cues for safety and verbal cues for pacing through activity steps  Please refer to flowsheet for functional performance  Due to fall, obtained orthostatics: supine - 121/78, EOB - 114/75, standing - 78/51 however pt moving arm despite asking to stay still  Denies dizziness/lightheadedness  Repeat EOB - 111/77 and 110/68 standing  Did notify MARIA Hopper and PTA Varsha    Patient continues to be functioning below baseline level, occupational performance remains limited secondary to factors listed above and increased risk for falls and injury  Pt left supine in bed, alarm armed and call bell and bedside table within reach; all needs met  Pt continues to make progress towards POC, possibly change of D/C disposition to home with [place] pending steps with PT  Plan   Treatment Interventions ADL retraining;Functional transfer training;UE strengthening/ROM; Endurance training;Cognitive reorientation;Patient/family training; Compensatory technique education;Continued evaluation   Goal Expiration Date 04/07/23   OT Treatment Day 3   OT Frequency 2-3x/wk   Recommendation   OT Discharge Recommendation Home with home health rehabilitation  (and 24/7 S - pending stairs)   AM-PAC Daily Activity Inpatient   Lower Body Dressing 2   Bathing 3   Toileting 3   Upper Body Dressing 3   Grooming 4   Eating 4   Daily Activity Raw Score 19   Daily Activity Standardized Score (Calc for Raw Score >=11) 40 22   AM-PAC Applied Cognition Inpatient   Following a Speech/Presentation 3   Understanding Ordinary Conversation 3   Taking Medications 2   Remembering Where Things Are Placed or Put Away 2   Remembering List of 4-5 Errands 2   Taking Care of Complicated Tasks 2   Applied Cognition Raw Score 14   Applied Cognition Standardized Score 32 02     [place] Patient

## 2023-04-05 ENCOUNTER — APPOINTMENT (INPATIENT)
Dept: RADIOLOGY | Facility: HOSPITAL | Age: 58
End: 2023-04-05

## 2023-04-05 ENCOUNTER — HOME HEALTH ADMISSION (OUTPATIENT)
Dept: HOME HEALTH SERVICES | Facility: HOME HEALTHCARE | Age: 58
End: 2023-04-05

## 2023-04-05 LAB
ALBUMIN SERPL BCP-MCNC: 3.7 G/DL (ref 3.5–5)
ALP SERPL-CCNC: 61 U/L (ref 34–104)
ALT SERPL W P-5'-P-CCNC: 10 U/L (ref 7–52)
ANION GAP SERPL CALCULATED.3IONS-SCNC: 4 MMOL/L (ref 4–13)
AST SERPL W P-5'-P-CCNC: 14 U/L (ref 13–39)
BILIRUB SERPL-MCNC: 0.92 MG/DL (ref 0.2–1)
BUN SERPL-MCNC: 11 MG/DL (ref 5–25)
CALCIUM SERPL-MCNC: 8.8 MG/DL (ref 8.4–10.2)
CHLORIDE SERPL-SCNC: 109 MMOL/L (ref 96–108)
CO2 SERPL-SCNC: 29 MMOL/L (ref 21–32)
CREAT SERPL-MCNC: 0.87 MG/DL (ref 0.6–1.3)
ERYTHROCYTE [DISTWIDTH] IN BLOOD BY AUTOMATED COUNT: 14.2 % (ref 11.6–15.1)
GFR SERPL CREATININE-BSD FRML MDRD: 73 ML/MIN/1.73SQ M
GLUCOSE SERPL-MCNC: 90 MG/DL (ref 65–140)
HCT VFR BLD AUTO: 37.9 % (ref 34.8–46.1)
HGB BLD-MCNC: 12.2 G/DL (ref 11.5–15.4)
MCH RBC QN AUTO: 29.3 PG (ref 26.8–34.3)
MCHC RBC AUTO-ENTMCNC: 32.2 G/DL (ref 31.4–37.4)
MCV RBC AUTO: 91 FL (ref 82–98)
PLATELET # BLD AUTO: 285 THOUSANDS/UL (ref 149–390)
PMV BLD AUTO: 8.7 FL (ref 8.9–12.7)
POTASSIUM SERPL-SCNC: 4.1 MMOL/L (ref 3.5–5.3)
PROT SERPL-MCNC: 6 G/DL (ref 6.4–8.4)
RBC # BLD AUTO: 4.16 MILLION/UL (ref 3.81–5.12)
SODIUM SERPL-SCNC: 142 MMOL/L (ref 135–147)
WBC # BLD AUTO: 5.1 THOUSAND/UL (ref 4.31–10.16)

## 2023-04-05 RX ADMIN — QUETIAPINE FUMARATE 50 MG: 25 TABLET ORAL at 17:22

## 2023-04-05 RX ADMIN — BENZTROPINE MESYLATE 1 MG: 1 TABLET ORAL at 17:22

## 2023-04-05 RX ADMIN — FOLIC ACID 1000 MCG: 1 TABLET ORAL at 08:30

## 2023-04-05 RX ADMIN — PAROXETINE 60 MG: 20 TABLET, FILM COATED ORAL at 21:46

## 2023-04-05 RX ADMIN — ATORVASTATIN CALCIUM 40 MG: 40 TABLET, FILM COATED ORAL at 17:22

## 2023-04-05 RX ADMIN — PAROXETINE 30 MG: 20 TABLET, FILM COATED ORAL at 08:30

## 2023-04-05 RX ADMIN — ASPIRIN 81 MG: 81 TABLET, COATED ORAL at 08:30

## 2023-04-05 RX ADMIN — QUETIAPINE FUMARATE 50 MG: 25 TABLET ORAL at 21:46

## 2023-04-05 RX ADMIN — PAROXETINE 30 MG: 20 TABLET, FILM COATED ORAL at 17:22

## 2023-04-05 RX ADMIN — MIRTAZAPINE 7.5 MG: 15 TABLET, FILM COATED ORAL at 17:22

## 2023-04-05 RX ADMIN — PREGABALIN 50 MG: 50 CAPSULE ORAL at 17:22

## 2023-04-05 RX ADMIN — PREGABALIN 50 MG: 50 CAPSULE ORAL at 08:30

## 2023-04-05 RX ADMIN — FERROUS SULFATE TAB 325 MG (65 MG ELEMENTAL FE) 325 MG: 325 (65 FE) TAB at 08:30

## 2023-04-05 RX ADMIN — BENZTROPINE MESYLATE 1 MG: 1 TABLET ORAL at 08:30

## 2023-04-05 RX ADMIN — LITHIUM CARBONATE 300 MG: 300 TABLET, FILM COATED, EXTENDED RELEASE ORAL at 21:46

## 2023-04-05 RX ADMIN — PANTOPRAZOLE SODIUM 40 MG: 40 TABLET, DELAYED RELEASE ORAL at 06:04

## 2023-04-05 RX ADMIN — AMLODIPINE BESYLATE 10 MG: 10 TABLET ORAL at 08:30

## 2023-04-05 RX ADMIN — ENOXAPARIN SODIUM 40 MG: 100 INJECTION SUBCUTANEOUS at 08:30

## 2023-04-05 RX ADMIN — OXYCODONE 5 MG: 5 TABLET ORAL at 08:29

## 2023-04-05 RX ADMIN — BUSPIRONE HYDROCHLORIDE 20 MG: 10 TABLET ORAL at 08:30

## 2023-04-05 RX ADMIN — BUSPIRONE HYDROCHLORIDE 20 MG: 10 TABLET ORAL at 17:22

## 2023-04-05 RX ADMIN — BUSPIRONE HYDROCHLORIDE 20 MG: 10 TABLET ORAL at 21:46

## 2023-04-05 RX ADMIN — VALBENAZINE 40 MG: 40 CAPSULE ORAL at 10:23

## 2023-04-05 RX ADMIN — QUETIAPINE FUMARATE 50 MG: 25 TABLET ORAL at 08:30

## 2023-04-05 NOTE — PROGRESS NOTES
[address] [place] [address]  Progress Note  Name: Chiki Garcia  MRN: [id number]  Unit/Bed#: E5 -01 I Date of Admission: 3/23/2023   Date of Service: 4/5/2023 I Hospital Day: 12    Assessment/Plan   * Small bowel obstruction [facility])  Assessment & Plan  Background: Patient presented to the ED following multiple episodes of vomiting at home found to have high-grade small bowel obstruction  · Was followed by surgery  NG tube was unsuccessful in placement  · Eventually diet was started and tolerated  · Repeat imaging subsequently demonstrated contrast in the right colon and has had bowel movements  · Disposition: Initially awaiting rehab placement but now cleared for discharge home    Witnessed seizure-like activity [facility])  Assessment & Plan  · Day of planned discharge 4/1/23 the patient had a fall and subsequently seizure-like activity which was witnessed  · Neurology consulted  History of nonepileptic seizures in the past   · Seen by neurology this admission  · EEG this admission negative and keppra has been discontinued    Mixed hyperlipidemia  Assessment & Plan  · Continue atorvastatin    Tardive dyskinesia  Assessment & Plan  · Chronic tar dive dyskinesia follows with psychiatry and neurology as an outpatient  · Continue benztropine and ingrezza    Essential hypertension  Assessment & Plan  · Blood pressure stable on amlodipine    Bipolar disorder (HCC)  Assessment & Plan  · Mood stable  · Continue home regimen of quetiapine 50 mg 3 times daily, lithium 300 mg at bedtime, paroxetine 30 mg twice daily and 60 mg at bedtime, and buspirone    Chronic pain disorder  Assessment & Plan  · Continue pregabalin 50 mg  · X-ray of hips and femur bilaterally does not demonstrate acute bony fracture        VTE Pharmacologic Prophylaxis:  Moderate Risk (Score 3-4) - Pharmacological DVT Prophylaxis Ordered: enoxaparin (Lovenox)      Patient Centered Rounds: I have performed bedside rounds with nursing "staff today  Discussions with Specialists or Other Care Team Provider: Case management and PT    Education and Discussions with Family / Patient: Attempted to update  (daughter and son in law) via phone  Left voicemail  Left voicemail for both parties that patient is medically stable for discharge    Time Spent for Care: This time was spent on one or more of the following: performing physical exam; counseling and coordination of care; obtaining or reviewing history; documenting in the medical record; reviewing/ordering tests, medications or procedures; communicating with other healthcare professionals and discussing with patient's family/caregivers  Current Length of Stay: 12 day(s)  Current Patient Status: Inpatient   Certification Statement: The patient will continue to require additional inpatient hospital stay due to Cannot get a hold of family for discharge home  Discharge Plan: Anticipate discharge later today or tomorrow to home with home services  Code Status: Level 1 - Full Code      Subjective:   Patient seen and examined  Had some leg pains earlier but x-rays within limits  Objective:   Vitals: Blood pressure 126/80, pulse 77, temperature 99 2 °F (37 3 °C), temperature source Oral, resp  rate 17, height 5' 1\" (1 549 m), weight 72 5 kg (159 lb 13 3 oz), SpO2 95 %, not currently breastfeeding  No intake or output data in the 24 hours ending 04/05/23 1731    Physical Exam  Vitals reviewed  Constitutional:       General: She is not in acute distress  Appearance: Normal appearance  HENT:      Head: Atraumatic  Cardiovascular:      Rate and Rhythm: Regular rhythm  Heart sounds: Normal heart sounds  Pulmonary:      Breath sounds: Decreased breath sounds present  No wheezing  Abdominal:      General: Bowel sounds are normal       Palpations: Abdomen is soft  Tenderness: There is no guarding or rebound  Musculoskeletal:         General: No swelling     Skin:     " General: Skin is warm  Neurological:      Mental Status: She is alert  Mental status is at baseline  Psychiatric:      Comments: tar dive dyskinesia       Additional Data:   Labs:  Results from last 7 days   Lab Units 04/05/23  0433 04/02/23  0520 04/01/23  1319 03/30/23  0443   WBC Thousand/uL 5 10 4 60 6 17 3 80*   HEMOGLOBIN g/dL 12 2 12 2 13 9 10 3*   PLATELETS Thousands/uL 285 304 325 265   MCV fL 91 95 92 92   TOTAL NEUT ABS Thousand/uL  --   --  2 65 1 56*   BANDS PCT %  --   --   --  1     Results from last 7 days   Lab Units 04/05/23  0433 04/02/23  0520 04/01/23  1319   SODIUM mmol/L 142 140 139   POTASSIUM mmol/L 4 1 4 0 3 9   CHLORIDE mmol/L 109* 109* 104   CO2 mmol/L 29 24 24   ANION GAP mmol/L 4 7 11   BUN mg/dL 11 7 9   CREATININE mg/dL 0 87 0 72 1 03   CALCIUM mg/dL 8 8 8 5 8 7   ALBUMIN g/dL 3 7  --   --    TOTAL BILIRUBIN mg/dL 0 92  --   --    ALK PHOS U/L 61  --   --    ALT U/L 10  --   --    AST U/L 14  --   --    EGFR ml/min/1 73sq m 73 91 59   GLUCOSE RANDOM mg/dL 90 76 104     Results from last 7 days   Lab Units 04/02/23  0520 04/01/23  1319   MAGNESIUM mg/dL 2 5 1 7*     Results from last 7 days   Lab Units 04/01/23  1319   CK TOTAL U/L 57              Results from last 7 days   Lab Units 04/01/23  1754 04/01/23  1319   LACTIC ACID mmol/L 0 9 4 6*     Results from last 7 days   Lab Units 04/01/23  1304   POC GLUCOSE mg/dl 109             * I Have Reviewed All Lab Data Listed Above      Cultures:   Results from last 7 days   Lab Units 03/31/23  1404   INFLUENZA A PCR  Negative       Results from last 7 days   Lab Units 03/31/23  1404   SARS-COV-2  Negative   INFLUENZA A PCR  Negative   INFLUENZA B PCR  Negative   RSV PCR  Negative           Lines/Drains:  Invasive Devices     Peripheral Intravenous Line  Duration           Peripheral IV 04/01/23 Distal;Right;Upper;Ventral (anterior) Arm 4 days              Telemetry:      Imaging:  Imaging Reports Reviewed Today Include:       XR hips bilateral 2 vw w pelvis if performed    Result Date: 4/5/2023  Impression: No acute osseous abnormality   Workstation performed: [id number]         Scheduled Meds:  Current Facility-Administered Medications   Medication Dose Route Frequency Provider Last Rate   • acetaminophen  650 mg Oral Q6H PRN Kenia Son, PALishaC     • aluminum-magnesium hydroxide-simethicone  30 mL Oral Q6H PRN Kenia Son, PA-C     • amLODIPine  10 mg Oral Daily Kenia Son, PALishaC     • aspirin  81 mg Oral Daily Kenia Son, PALishaC     • atorvastatin  40 mg Oral After Nohemi Pacer, PA-C     • benztropine  1 mg Oral BID Kenia Son, PALishaC     • busPIRone  20 mg Oral TID Kenia Son, PALishaC     • enoxaparin  40 mg Subcutaneous Q24H [facility] Alysia Avelar MD     • ferrous sulfate  325 mg Oral Daily With Breakfast Kenia Pappas, PALishaC     • folic acid  7,616 mcg Oral Daily Kenia Pappas, PA-C     • HYDROmorphone  0 5 mg Intravenous Q4H PRN Alysia Avelar MD     • hydrOXYzine HCL  50 mg Oral TID PRN Kenia Son, PALishaC     • lithium carbonate  300 mg Oral HS Kenia Son, PALishaC     • LORazepam  2 mg Intravenous Once PRN Anna Wahl MD     • LORazepam  0 5 mg Oral BID PRN Kenia Pappas, PALishaC     • mirtazapine  7 5 mg Oral QPM Kenia Son, PALishaC     • ondansetron  4 mg Intravenous Q6H PRN Kenia Pappas, PA-C     • oxyCODONE  5 mg Oral Q4H PRN Alysia Avelar MD     • pantoprazole  40 mg Oral Daily Before Breakfast Anna Wahl MD     • PARoxetine  30 mg Oral BID Kenia Son, PALishaC     • PARoxetine  60 mg Oral HS Kenia Son, PALishaC     • prazosin  2 mg Oral HS Kenia Son, PALishaC     • pregabalin  50 mg Oral BID Kenia Son, PALishaC     • QUEtiapine  50 mg Oral TID Kenia Son, PALishaC     • Valbenazine Tosylate  40 mg Oral Daily Patrick Marie DO         Today, Patient Was Seen By: Nguyen Frank, DO    ** Please Note: Dictation voice to text software may have been used in the creation of this document   **

## 2023-04-05 NOTE — PLAN OF CARE
Problem: PHYSICAL THERAPY ADULT  Goal: Performs mobility at highest level of function for planned discharge setting  See evaluation for individualized goals  Description: Treatment/Interventions: Functional transfer training, LE strengthening/ROM, Elevations, Therapeutic exercise, Endurance training, Patient/family training, Bed mobility, Gait training, Continued evaluation, Spoke to nursing, OT          See flowsheet documentation for full assessment, interventions and recommendations  Outcome: Progressing  Note: Prognosis: Good  Problem List: Decreased strength, Decreased range of motion, Impaired balance, Decreased mobility, Decreased coordination, Pain  Assessment: Pt  reported no pain beginning of session however sever pain reported in B/L hips into the buttocks when patient laying supine and attempting to move  Pt  unable to perform supine SLR or HS even with assistance due to pain  Reported the same to [facility], RN Darryl Mccullough and Dr Collazo Ip  Pt  reported she did not have this pain before and thinks its from her fall  pt  did not show any discomfort or report with any transfers or ambulation  pt  Mod I for all trasnfers  Noted mild knee buckling/instability during second set of stairs  Negotiated steps using training stairs and patient given cues to hold on the R handrail with BUEs  No LOB noted t/o session  Pt  continues with tremors  Pt  positioned back in bed and bed alarm engaged post session  Will continue to follow per PT POC to address mobility deficits and maximzie functional mobility  Barriers to Discharge: None     PT Discharge Recommendation: Home with home health rehabilitation (with A at home as before)    See flowsheet documentation for full assessment

## 2023-04-05 NOTE — ASSESSMENT & PLAN NOTE
· Continue pregabalin 50 mg  · X-ray of hips and femur bilaterally does not demonstrate acute bony fracture

## 2023-04-05 NOTE — ASSESSMENT & PLAN NOTE
· Chronic tar dive dyskinesia follows with psychiatry and neurology as an outpatient    · Continue benztropine and ingrezza

## 2023-04-05 NOTE — PHYSICAL THERAPY NOTE
Physical Therapy Treatment Note     04/05/23 1141   PT Last Visit   PT Visit Date 04/05/23   Note Type   Note Type Treatment   Pain Assessment   Pain Assessment Tool 0-10   Pain Score 10 - Worst Possible Pain   Pain Location/Orientation Orientation: Left; Location: Hip; Other (Comment)  (only when in bed supine)   Restrictions/Precautions   Weight Bearing Precautions Per Order No   Other Precautions Bed Alarm; Fall Risk;Pain;Telemetry   General   Chart Reviewed Yes   Family/Caregiver Present No   Subjective   Subjective Pt  agreeable to PT  No pain reported when asked initially at the beginning of session  Bed Mobility   Supine to Sit 6  Modified independent   Additional items HOB elevated; Bedrails; Increased time required   Sit to Supine 6  Modified independent   Additional items HOB elevated; Bedrails; Increased time required   Transfers   Sit to Stand 6  Modified independent   Additional items Armrests   Stand to Sit 6  Modified independent   Additional items Armrests   Stand pivot 5  Supervision   Additional items Assist x 1   Ambulation/Elevation   Gait pattern Improper Weight shift; Forward Flexion;Decreased foot clearance; Short stride; Ataxia; Excessively slow; Inconsistent zack; Foward flexed;Decreased heel strike;Decreased toe off   Gait Assistance 5  Supervision   Additional items Assist x 1   Assistive Device Rolling walker   Distance 120ft x 2   Stair Management Assistance   (CGA)   Additional items Assist x 1; Increased time required;Verbal cues   Stair Management Technique One rail R;Step to pattern; Sideways; Foreward;Nonreciprocal   Number of Stairs 10   Balance   Static Sitting Good   Dynamic Sitting Fair +   Static Standing Fair   Dynamic Standing Fair -   Ambulatory Fair -   Endurance Deficit   Endurance Deficit Yes   Endurance Deficit Description pain in B/L hips when in supine post session   Activity Tolerance   Activity Tolerance Patient tolerated treatment well   Nurse Made Aware yes   Exercises   TKR Supine;Sitting;20 reps;5 reps;Bilateral;AROM;AAROM   Assessment   Prognosis Good   Problem List Decreased strength;Decreased range of motion; Impaired balance;Decreased mobility; Decreased coordination;Pain   Assessment Pt  reported no pain beginning of session however sever pain reported in B/L hips into the buttocks when patient laying supine and attempting to move  Pt  unable to perform supine SLR or HS even with assistance due to pain  Reported the same to [facility], RN Kiah Machuca and Dr Mary Tomas  Pt  reported she did not have this pain before and thinks its from her fall  pt  did not show any discomfort or report with any transfers or ambulation  pt  Mod I for all trasnfers  Noted mild knee buckling/instability during second set of stairs  Negotiated steps using training stairs and patient given cues to hold on the R handrail with BUEs  No LOB noted t/o session  Pt  continues with tremors  Pt  positioned back in bed and bed alarm engaged post session  Will continue to follow per PT POC to address mobility deficits and maximzie functional mobility  Barriers to Discharge None   Goals   Patient Goals I want to go to rehab  I need to get more therpy   STG Expiration Date 04/07/23   PT Treatment Day 4   Plan   Treatment/Interventions Functional transfer training;LE strengthening/ROM; Elevations; Therapeutic exercise;Spoke to nursing;Spoke to case management;Spoke to MD;Gait training;Bed mobility; Equipment eval/education;Patient/family training   Progress Progressing toward goals   PT Frequency 3-5x/wk   Recommendation   PT Discharge Recommendation Home with home health rehabilitation  (with A at home as before)   Equipment Recommended Walker   AM-PAC Basic Mobility Inpatient   Turning in Flat Bed Without Bedrails 4   Lying on Back to Sitting on Edge of Flat Bed Without Bedrails 4   Moving Bed to Chair 4   Standing Up From Chair Using Arms 4   Walk in Room 4   Climb 3-5 Stairs With Railing 3   Basic Mobility Inpatient Raw Score 23   Basic Mobility Standardized Score 50 88   Highest Level Of Mobility   -HLM Goal 7: Walk 25 feet or more   JH-HLM Achieved 7: Walk 25 feet or more   End of Consult   Patient Position at End of Consult Supine; All needs within reach;Bed/Chair alarm activated         Chris Vera PTA    An AM-PAC basic mobility standardized score less than 42 9 suggest the patient may benefit from discharge to post-acute rehab services

## 2023-04-05 NOTE — PLAN OF CARE
Problem: Potential for Falls  Goal: Patient will remain free of falls  Description: INTERVENTIONS:  - Educate patient/family on patient safety including physical limitations  - Instruct patient to call for assistance with activity   - Consult OT/PT to assist with strengthening/mobility   - Keep Call bell within reach  - Keep bed low and locked with side rails adjusted as appropriate  - Keep care items and personal belongings within reach  - Initiate and maintain comfort rounds  - Make Fall Risk Sign visible to staff  - Offer Toileting every 2 Hours, in advance of need  - Initiate/Maintain bed alarm  - Obtain necessary fall risk management equipment  - Apply yellow socks and bracelet for high fall risk patients  - Consider moving patient to room near nurses station  4/4/2023 2259 by Radha Cody RN  Outcome: Progressing  4/4/2023 2259 by Radha Cody RN  Outcome: Progressing     Problem: PAIN - ADULT  Goal: Verbalizes/displays adequate comfort level or baseline comfort level  Description: Interventions:  - Encourage patient to monitor pain and request assistance  - Assess pain using appropriate pain scale  - Administer analgesics based on type and severity of pain and evaluate response  - Implement non-pharmacological measures as appropriate and evaluate response  - Consider cultural and social influences on pain and pain management  - Notify physician/advanced practitioner if interventions unsuccessful or patient reports new pain  4/4/2023 2259 by Radha Cody RN  Outcome: Progressing  4/4/2023 2259 by Radha Cody RN  Outcome: Progressing     Problem: INFECTION - ADULT  Goal: Absence or prevention of progression during hospitalization  Description: INTERVENTIONS:  - Assess and monitor for signs and symptoms of infection  - Monitor lab/diagnostic results  - Monitor all insertion sites, i e  indwelling lines, tubes, and drains  - Monitor endotracheal if appropriate and nasal secretions for changes in amount and color  - [place] appropriate cooling/warming therapies per order  - Administer medications as ordered  - Instruct and encourage patient and family to use good hand hygiene technique  - Identify and instruct in appropriate isolation precautions for identified infection/condition  4/4/2023 2259 by Christina Toth RN  Outcome: Progressing  4/4/2023 2259 by Christina Toth RN  Outcome: Progressing     Problem: SAFETY ADULT  Goal: Patient will remain free of falls  Description: INTERVENTIONS:  - Educate patient/family on patient safety including physical limitations  - Instruct patient to call for assistance with activity   - Consult OT/PT to assist with strengthening/mobility   - Keep Call bell within reach  - Keep bed low and locked with side rails adjusted as appropriate  - Keep care items and personal belongings within reach  - Initiate and maintain comfort rounds  - Make Fall Risk Sign visible to staff  - Offer Toileting every 2 Hours, in advance of need  - Initiate/Maintain bed alarm  - Obtain necessary fall risk management equipment  - Apply yellow socks and bracelet for high fall risk patients  - Consider moving patient to room near nurses station  4/4/2023 2259 by Christina Toth RN  Outcome: Progressing  4/4/2023 2259 by Christina Toth RN  Outcome: Progressing  Goal: Maintain or return to baseline ADL function  Description: INTERVENTIONS:  -  Assess patient's ability to carry out ADLs; assess patient's baseline for ADL function and identify physical deficits which impact ability to perform ADLs (bathing, care of mouth/teeth, toileting, grooming, dressing, etc )  - Assess/evaluate cause of self-care deficits   - Assess range of motion  - Assess patient's mobility; develop plan if impaired  - Assess patient's need for assistive devices and provide as appropriate  - Encourage maximum independence but intervene and supervise when necessary  - Involve family in performance of ADLs  - Assess for home care needs following discharge   - Consider OT consult to assist with ADL evaluation and planning for discharge  - Provide patient education as appropriate  4/4/2023 2259 by Yeyo Carson RN  Outcome: Progressing  4/4/2023 2259 by Yeyo Carson RN  Outcome: Progressing  Goal: Maintains/Returns to pre admission functional level  Description: INTERVENTIONS:  - Perform BMAT or MOVE assessment daily    - Set and communicate daily mobility goal to care team and patient/family/caregiver  - Collaborate with rehabilitation services on mobility goals if consulted  - Perform Range of Motion   - Reposition patient every 2 hours    - Dangle patient  - Stand patient   - Ambulate patient   - Out of bed to chair   - Out of bed for meals   - Out of bed for toileting  - Record patient progress and toleration of activity level   4/4/2023 2259 by Yeyo Carson RN  Outcome: Progressing  4/4/2023 2259 by Yeyo Carson RN  Outcome: Progressing     Problem: DISCHARGE PLANNING  Goal: Discharge to home or other facility with appropriate resources  Description: INTERVENTIONS:  - Identify barriers to discharge w/patient and caregiver  - Arrange for needed discharge resources and transportation as appropriate  - Identify discharge learning needs (meds, wound care, etc )  - Arrange for interpretive services to assist at discharge as needed  - Refer to Case Management Department for coordinating discharge planning if the patient needs post-hospital services based on physician/advanced practitioner order or complex needs related to functional status, cognitive ability, or social support system  4/4/2023 2259 by Yeyo Carson RN  Outcome: Progressing  4/4/2023 2259 by Yeyo Carson RN  Outcome: Progressing     Problem: Knowledge Deficit  Goal: Patient/family/caregiver demonstrates understanding of disease process, treatment plan, medications, and discharge instructions  Description: Complete learning assessment and assess knowledge base  Interventions:  - Provide teaching at level of understanding  - Provide teaching via preferred learning methods  4/4/2023 2259 by Mian Marie RN  Outcome: Progressing  4/4/2023 2259 by Mian Marie RN  Outcome: Progressing     Problem: MOBILITY - ADULT  Goal: Maintain or return to baseline ADL function  Description: INTERVENTIONS:  -  Assess patient's ability to carry out ADLs; assess patient's baseline for ADL function and identify physical deficits which impact ability to perform ADLs (bathing, care of mouth/teeth, toileting, grooming, dressing, etc )  - Assess/evaluate cause of self-care deficits   - Assess range of motion  - Assess patient's mobility; develop plan if impaired  - Assess patient's need for assistive devices and provide as appropriate  - Encourage maximum independence but intervene and supervise when necessary  - Involve family in performance of ADLs  - Assess for home care needs following discharge   - Consider OT consult to assist with ADL evaluation and planning for discharge  - Provide patient education as appropriate  4/4/2023 2259 by Mian Marie RN  Outcome: Progressing  4/4/2023 2259 by Mian Marie RN  Outcome: Progressing  Goal: Maintains/Returns to pre admission functional level  Description: INTERVENTIONS:  - Perform BMAT or MOVE assessment daily    - Set and communicate daily mobility goal to care team and patient/family/caregiver  - Collaborate with rehabilitation services on mobility goals if consulted  - Perform Range of Motion   - Reposition patient every 2 hours    - Dangle patient   - Stand patient   - Ambulate patient   - Out of bed to chair    - Out of bed for meals   - Out of bed for toileting  - Record patient progress and toleration of activity level   4/4/2023 2259 by Mian Marie RN  Outcome: Progressing  4/4/2023 2259 by Mian Marie RN  Outcome: Progressing     Problem: Nutrition/Hydration-ADULT  Goal: Nutrient/Hydration intake appropriate for improving, restoring or maintaining nutritional needs  Description: Monitor and assess patient's nutrition/hydration status for malnutrition  Collaborate with interdisciplinary team and initiate plan and interventions as ordered  Monitor patient's weight and dietary intake as ordered or per policy  Utilize nutrition screening tool and intervene as necessary  Determine patient's food preferences and provide high-protein, high-caloric foods as appropriate       INTERVENTIONS:  - Monitor oral intake, urinary output, labs, and treatment plans  - Assess nutrition and hydration status and recommend course of action  - Evaluate amount of meals eaten  - Assist patient with eating if necessary   - Allow adequate time for meals  - Recommend/ encourage appropriate diets, oral nutritional supplements, and vitamin/mineral supplements  - Order, calculate, and assess calorie counts as needed  - Recommend, monitor, and adjust tube feedings and TPN/PPN based on assessed needs  - Assess need for intravenous fluids  - Provide specific nutrition/hydration education as appropriate  - Include patient/family/caregiver in decisions related to nutrition  4/4/2023 2259 by Herve Villeda RN  Outcome: Progressing  4/4/2023 2259 by Herve Villeda RN  Outcome: Progressing     Problem: NEUROSENSORY - ADULT  Goal: Achieves stable or improved neurological status  Description: INTERVENTIONS  - Monitor and report changes in neurological status  - Monitor vital signs such as temperature, blood pressure, glucose, and any other labs ordered   - Initiate measures to prevent increased intracranial pressure  - Monitor for seizure activity and implement precautions if appropriate      4/4/2023 2259 by Herve Villeda RN  Outcome: Progressing  4/4/2023 2259 by Herve Villeda RN  Outcome: Progressing  Goal: Remains free of injury related to seizures activity  Description: INTERVENTIONS  - Maintain airway, patient safety  and administer oxygen as ordered  - Monitor patient for seizure activity, document and report duration and description of seizure to physician/advanced practitioner  - If seizure occurs,  ensure patient safety during seizure  - Reorient patient post seizure  - Seizure pads on all 4 side rails  - Instruct patient/family to notify RN of any seizure activity including if an aura is experienced  - Instruct patient/family to call for assistance with activity based on nursing assessment  - Administer anti-seizure medications if ordered    4/4/2023 2259 by Lia Solomon RN  Outcome: Progressing  4/4/2023 2259 by Lia Solomon RN  Outcome: Progressing

## 2023-04-05 NOTE — PLAN OF CARE
Problem: Potential for Falls  Goal: Patient will remain free of falls  Description: INTERVENTIONS:  - Educate patient/family on patient safety including physical limitations  - Instruct patient to call for assistance with activity   - Consult OT/PT to assist with strengthening/mobility   - Keep Call bell within reach  - Keep bed low and locked with side rails adjusted as appropriate  - Keep care items and personal belongings within reach  - Initiate and maintain comfort rounds  - Make Fall Risk Sign visible to staff  - Offer Toileting every 2 Hours, in advance of need  - Initiate/Maintain bed alarm  - Obtain necessary fall risk management equipment  - Apply yellow socks and bracelet for high fall risk patients  - Consider moving patient to room near nurses station  Outcome: Progressing     Problem: PAIN - ADULT  Goal: Verbalizes/displays adequate comfort level or baseline comfort level  Description: Interventions:  - Encourage patient to monitor pain and request assistance  - Assess pain using appropriate pain scale  - Administer analgesics based on type and severity of pain and evaluate response  - Implement non-pharmacological measures as appropriate and evaluate response  - Consider cultural and social influences on pain and pain management  - Notify physician/advanced practitioner if interventions unsuccessful or patient reports new pain  Outcome: Progressing     Problem: INFECTION - ADULT  Goal: Absence or prevention of progression during hospitalization  Description: INTERVENTIONS:  - Assess and monitor for signs and symptoms of infection  - Monitor lab/diagnostic results  - Monitor all insertion sites, i e  indwelling lines, tubes, and drains  - Monitor endotracheal if appropriate and nasal secretions for changes in amount and color  - [place] appropriate cooling/warming therapies per order  - Administer medications as ordered  - Instruct and encourage patient and family to use good hand hygiene technique  - Identify and instruct in appropriate isolation precautions for identified infection/condition  Outcome: Progressing     Problem: SAFETY ADULT  Goal: Patient will remain free of falls  Description: INTERVENTIONS:  - Educate patient/family on patient safety including physical limitations  - Instruct patient to call for assistance with activity   - Consult OT/PT to assist with strengthening/mobility   - Keep Call bell within reach  - Keep bed low and locked with side rails adjusted as appropriate  - Keep care items and personal belongings within reach  - Initiate and maintain comfort rounds  - Make Fall Risk Sign visible to staff  - Offer Toileting every 2 Hours, in advance of need  - Initiate/Maintain bed alarm  - Obtain necessary fall risk management equipment  - Apply yellow socks and bracelet for high fall risk patients  - Consider moving patient to room near nurses station  Outcome: Progressing  Goal: Maintain or return to baseline ADL function  Description: INTERVENTIONS:  -  Assess patient's ability to carry out ADLs; assess patient's baseline for ADL function and identify physical deficits which impact ability to perform ADLs (bathing, care of mouth/teeth, toileting, grooming, dressing, etc )  - Assess/evaluate cause of self-care deficits   - Assess range of motion  - Assess patient's mobility; develop plan if impaired  - Assess patient's need for assistive devices and provide as appropriate  - Encourage maximum independence but intervene and supervise when necessary  - Involve family in performance of ADLs  - Assess for home care needs following discharge   - Consider OT consult to assist with ADL evaluation and planning for discharge  - Provide patient education as appropriate  Outcome: Progressing  Goal: Maintains/Returns to pre admission functional level  Description: INTERVENTIONS:  - Perform BMAT or MOVE assessment daily    - Set and communicate daily mobility goal to care team and patient/family/caregiver  - Collaborate with rehabilitation services on mobility goals if consulted  - Perform Range of Motion 3 times a day  - Reposition patient every 2 hours  - Dangle patient 3 times a day  - Stand patient 3 times a day  - Ambulate patient 3 times a day  - Out of bed to chair 3 times a day   - Out of bed for meals 3 times a day  - Out of bed for toileting  - Record patient progress and toleration of activity level   Outcome: Progressing     Problem: DISCHARGE PLANNING  Goal: Discharge to home or other facility with appropriate resources  Description: INTERVENTIONS:  - Identify barriers to discharge w/patient and caregiver  - Arrange for needed discharge resources and transportation as appropriate  - Identify discharge learning needs (meds, wound care, etc )  - Arrange for interpretive services to assist at discharge as needed  - Refer to Case Management Department for coordinating discharge planning if the patient needs post-hospital services based on physician/advanced practitioner order or complex needs related to functional status, cognitive ability, or social support system  Outcome: Progressing     Problem: Knowledge Deficit  Goal: Patient/family/caregiver demonstrates understanding of disease process, treatment plan, medications, and discharge instructions  Description: Complete learning assessment and assess knowledge base    Interventions:  - Provide teaching at level of understanding  - Provide teaching via preferred learning methods  Outcome: Progressing     Problem: MOBILITY - ADULT  Goal: Maintain or return to baseline ADL function  Description: INTERVENTIONS:  -  Assess patient's ability to carry out ADLs; assess patient's baseline for ADL function and identify physical deficits which impact ability to perform ADLs (bathing, care of mouth/teeth, toileting, grooming, dressing, etc )  - Assess/evaluate cause of self-care deficits   - Assess range of motion  - Assess patient's mobility; develop plan if impaired  - Assess patient's need for assistive devices and provide as appropriate  - Encourage maximum independence but intervene and supervise when necessary  - Involve family in performance of ADLs  - Assess for home care needs following discharge   - Consider OT consult to assist with ADL evaluation and planning for discharge  - Provide patient education as appropriate  Outcome: Progressing  Goal: Maintains/Returns to pre admission functional level  Description: INTERVENTIONS:  - Perform BMAT or MOVE assessment daily    - Set and communicate daily mobility goal to care team and patient/family/caregiver  - Collaborate with rehabilitation services on mobility goals if consulted  - Perform Range of Motion 3 times a day  - Reposition patient every 2 hours  - Dangle patient 3 times a day  - Stand patient 3 times a day  - Ambulate patient 3 times a day  - Out of bed to chair 3 times a day   - Out of bed for meals 3 times a day  - Out of bed for toileting  - Record patient progress and toleration of activity level   Outcome: Progressing     Problem: Nutrition/Hydration-ADULT  Goal: Nutrient/Hydration intake appropriate for improving, restoring or maintaining nutritional needs  Description: Monitor and assess patient's nutrition/hydration status for malnutrition  Collaborate with interdisciplinary team and initiate plan and interventions as ordered  Monitor patient's weight and dietary intake as ordered or per policy  Utilize nutrition screening tool and intervene as necessary  Determine patient's food preferences and provide high-protein, high-caloric foods as appropriate       INTERVENTIONS:  - Monitor oral intake, urinary output, labs, and treatment plans  - Assess nutrition and hydration status and recommend course of action  - Evaluate amount of meals eaten  - Assist patient with eating if necessary   - Allow adequate time for meals  - Recommend/ encourage appropriate diets, oral nutritional supplements, and vitamin/mineral supplements  - Order, calculate, and assess calorie counts as needed  - Recommend, monitor, and adjust tube feedings and TPN/PPN based on assessed needs  - Assess need for intravenous fluids  - Provide specific nutrition/hydration education as appropriate  - Include patient/family/caregiver in decisions related to nutrition  Outcome: Progressing     Problem: NEUROSENSORY - ADULT  Goal: Achieves stable or improved neurological status  Description: INTERVENTIONS  - Monitor and report changes in neurological status  - Monitor vital signs such as temperature, blood pressure, glucose, and any other labs ordered   - Initiate measures to prevent increased intracranial pressure  - Monitor for seizure activity and implement precautions if appropriate      Outcome: Progressing  Goal: Remains free of injury related to seizures activity  Description: INTERVENTIONS  - Maintain airway, patient safety  and administer oxygen as ordered  - Monitor patient for seizure activity, document and report duration and description of seizure to physician/advanced practitioner  - If seizure occurs,  ensure patient safety during seizure  - Reorient patient post seizure  - Seizure pads on all 4 side rails  - Instruct patient/family to notify RN of any seizure activity including if an aura is experienced  - Instruct patient/family to call for assistance with activity based on nursing assessment  - Administer anti-seizure medications if ordered    Outcome: Progressing

## 2023-04-05 NOTE — CASE MANAGEMENT
Case Management Discharge Planning Note    [facility] [place] [address]  [phone] MRN [id number]  : 1965 Date 2023       Current Admission Date: 3/23/2023  Current Admission Diagnosis:Small bowel obstruction [facility])   Patient Active Problem List    Diagnosis Date Noted   • Witnessed seizure-like activity ([facility] ) 2023   • Fall 2023   • Small bowel obstruction ([facility] ) 2023   • Memory loss 2023   • Hemiplegia, post-stroke ([facility] ) 2022   • Anemia 2022   • Status post total knee replacement, left 2022   • Seizure-like activity ([facility] ) 2022   • Hypokalemia 2022   • Constipation 03/15/2022   • S/P total knee arthroplasty, right 03/10/2022   • CVA (cerebral vascular accident) ([facility] ) 2022   • Preoperative evaluation to rule out surgical contraindication 2022   • Leukopenia 2021   • Mixed hyperlipidemia 2021   • Medical clearance for psychiatric admission 2021   • History of CVA (cerebrovascular accident) 2021   • Encephalopathy 2021   • Nausea 2021   • Multiple closed fractures of metatarsal bone of right foot 2021   • Chronic back pain 2021   • Arthritis of right knee 10/06/2020   • Dysphagia 2020   • Ambulatory dysfunction 2020   • Status post insertion of spinal cord stimulator 2020   • Superficial bacterial infection of skin 2020   • Scar of back 2020   • Impaired skin integrity associated with surgical incision 2020   • Rash 2020   • Primary osteoarthritis of both knees 2020   • Patellofemoral disorder of both knees 2020   • Tardive dyskinesia 2020   • MIMI (obstructive sleep apnea)    • Complaint related to dreams 2020   • Family history of colorectal cancer 2019   • Encounter for long-term use of opiate analgesic 2019   • Post laminectomy syndrome 10/07/2019   • Lumbar disc disease with radiculopathy 02/02/2018   • Spinal stenosis of lumbar region with neurogenic claudication 02/02/2018   • Lumbar radiculopathy 12/08/2017   • Bilateral hip pain 06/19/2017   • Primary localized osteoarthritis of both knees 06/16/2017   • Chronic pain disorder 02/28/2017   • Opioid dependence ([facility] ) 02/28/2017   • Sacroiliitis ([facility] ) 02/28/2017   • Lumbar spondylosis 10/31/2016   • Osteoarthritis of both hips 10/31/2016   • Generalized anxiety disorder 10/26/2016   • Major depressive disorder, recurrent episode, moderate degree ([facility] ) 09/08/2016   • Right knee pain 06/06/2016   • Recent unexplained weight loss 06/06/2016   • Fatigue 10/26/2015   • Bipolar disorder ([facility] ) 06/18/2015   • Panic disorder without agoraphobia 06/18/2015   • Post traumatic stress disorder 06/18/2015   • Left hip pain 07/25/2014   • Intractable low back pain 06/16/2014   • Tremor 06/12/2014   • Migraine 05/27/2014   • Esophageal reflux 05/01/2014   • Cognitive disorder 04/18/2014   • Female pelvic pain 10/30/2013   • Pain in joint, shoulder region 10/28/2013   • Overactive bladder 09/26/2013   • Osteoarthritis of knee 02/20/2013   • Insomnia 01/31/2013   • History of hypokalemia 01/03/2013   • Urinary incontinence 09/24/2012   • Vitamin D deficiency 09/18/2012   • Fibromyalgia 09/14/2012   • Essential hypertension 09/14/2012      LOS (days): 12  Geometric Mean LOS (GMLOS) (days): 3 00  Days to GMLOS:-9 6     OBJECTIVE:  Risk of Unplanned Readmission Score: 28 78         Current admission status: Inpatient   Preferred Pharmacy:   [address] [place] [address]  Phone: [phone] Fax: [phone]    [id number] [place] [address]  [address] [facility] [facility] [address]  Phone: [phone] Fax: [facility], [address] [place] [address] [place] [address]  Phone: [phone] Fax: [phone]    [address] [facility],[address], [address]  [address]  Phone: [phone] Fax: [address] [facility], [address] [facility]  [address] [facility] [address]  Phone: [phone] Fax: [phone] [address] [place], [address] [facility],  [address] [address]  Phone: [phone] Fax: [phone]    Primary Care Provider: Nelson Powers DO    Primary Insurance: [id number] [facility] [facility]  Secondary Insurance: [id number] DETAILS:    Discharge planning discussed with[de-identified] patient  Freedom of Choice: Yes  Comments - Freedom of Choice: Patient chose [facility]  CM contacted family/caregiver?: Yes  Were Treatment Team discharge recommendations reviewed with patient/caregiver?: Yes  Did patient/caregiver verbalize understanding of patient care needs?: Yes  Were patient/caregiver advised of the risks associated with not following Treatment Team discharge recommendations?: Yes    Contacts  Patient Contacts: Bharti Rodriguez(Dtr) [phone] (M)  Relationship to Patient[de-identified] Family  Contact Method: Phone  Phone Number: [phone]  Reason/Outcome: Discharge Planning, Continuity of Care, Emergency [address]         Is the patient interested in [place]  at discharge?: Yes  Via Garima Curiel requested[de-identified] Nursing, Physical Therapy, Occupational [address] [place] [address] Ave Name[de-identified] [facility] Provider[de-identified] PCP  Home Health Services Needed[de-identified] Gait/ADL Training, Strengthening/Theraputic Exercises to Improve Function, Evaluate Functional Status and Safety, Other (comment)  Homebound Criteria Met[de-identified] Uses an Assist Device (i e  cane, walker, etc)  Supporting Clincal Findings[de-identified] Limited Endurance    DME Referral Provided  Referral made for DME?: No    Other Referral/Resources/Interventions Provided:  Interventions: [facility]  Referral Comments: XAVIER MCGUIRE    [facility]  Discharge Destination Plan[de-identified] Home with [place] at Discharge : Wheelchair Nikki bajwa  Dispatcher Contacted: Yes  Number/Name of Dispatcher: Ar Genao [id number]     ETA of Transport (Date): 04/05/23  ETA of Transport (Time):  (PENDING)     Transfer Mode: Wheelchair     Additional Comments: Patient understands and is agreeable to discharge plan  Patient would have preferred to go to rehab but understands home health is being recommended      Update:    Discharge cancelled CM unable to reach patient's family

## 2023-04-06 VITALS
WEIGHT: 159.83 LBS | OXYGEN SATURATION: 98 % | DIASTOLIC BLOOD PRESSURE: 80 MMHG | RESPIRATION RATE: 19 BRPM | TEMPERATURE: 98.3 F | HEIGHT: 61 IN | BODY MASS INDEX: 30.18 KG/M2 | HEART RATE: 72 BPM | SYSTOLIC BLOOD PRESSURE: 109 MMHG

## 2023-04-06 RX ORDER — BUTALBITAL, ACETAMINOPHEN AND CAFFEINE 300; 40; 50 MG/1; MG/1; MG/1
1 CAPSULE ORAL DAILY PRN
Qty: 10 CAPSULE | Refills: 0 | Status: SHIPPED | OUTPATIENT
Start: 2023-04-06

## 2023-04-06 RX ADMIN — ENOXAPARIN SODIUM 40 MG: 100 INJECTION SUBCUTANEOUS at 08:01

## 2023-04-06 RX ADMIN — QUETIAPINE FUMARATE 50 MG: 25 TABLET ORAL at 08:02

## 2023-04-06 RX ADMIN — ASPIRIN 81 MG: 81 TABLET, COATED ORAL at 08:02

## 2023-04-06 RX ADMIN — PAROXETINE 30 MG: 20 TABLET, FILM COATED ORAL at 08:02

## 2023-04-06 RX ADMIN — VALBENAZINE 40 MG: 40 CAPSULE ORAL at 08:02

## 2023-04-06 RX ADMIN — FERROUS SULFATE TAB 325 MG (65 MG ELEMENTAL FE) 325 MG: 325 (65 FE) TAB at 06:46

## 2023-04-06 RX ADMIN — FOLIC ACID 1000 MCG: 1 TABLET ORAL at 08:02

## 2023-04-06 RX ADMIN — PREGABALIN 50 MG: 50 CAPSULE ORAL at 08:02

## 2023-04-06 RX ADMIN — BENZTROPINE MESYLATE 1 MG: 1 TABLET ORAL at 08:02

## 2023-04-06 RX ADMIN — BUSPIRONE HYDROCHLORIDE 20 MG: 10 TABLET ORAL at 08:02

## 2023-04-06 RX ADMIN — PANTOPRAZOLE SODIUM 40 MG: 40 TABLET, DELAYED RELEASE ORAL at 06:46

## 2023-04-06 RX ADMIN — OXYCODONE 5 MG: 5 TABLET ORAL at 08:02

## 2023-04-06 NOTE — PLAN OF CARE
Problem: Potential for Falls  Goal: Patient will remain free of falls  Description: INTERVENTIONS:  - Educate patient/family on patient safety including physical limitations  - Instruct patient to call for assistance with activity   - Consult OT/PT to assist with strengthening/mobility   - Keep Call bell within reach  - Keep bed low and locked with side rails adjusted as appropriate  - Keep care items and personal belongings within reach  - Initiate and maintain comfort rounds  - Make Fall Risk Sign visible to staff  - Offer Toileting every 2 Hours, in advance of need  - Initiate/Maintain bed alarm  - Obtain necessary fall risk management equipment  - Apply yellow socks and bracelet for high fall risk patients  - Consider moving patient to room near nurses station  Outcome: Progressing     Problem: PAIN - ADULT  Goal: Verbalizes/displays adequate comfort level or baseline comfort level  Description: Interventions:  - Encourage patient to monitor pain and request assistance  - Assess pain using appropriate pain scale  - Administer analgesics based on type and severity of pain and evaluate response  - Implement non-pharmacological measures as appropriate and evaluate response  - Consider cultural and social influences on pain and pain management  - Notify physician/advanced practitioner if interventions unsuccessful or patient reports new pain  Outcome: Progressing     Problem: INFECTION - ADULT  Goal: Absence or prevention of progression during hospitalization  Description: INTERVENTIONS:  - Assess and monitor for signs and symptoms of infection  - Monitor lab/diagnostic results  - Monitor all insertion sites, i e  indwelling lines, tubes, and drains  - Monitor endotracheal if appropriate and nasal secretions for changes in amount and color  - [place] appropriate cooling/warming therapies per order  - Administer medications as ordered  - Instruct and encourage patient and family to use good hand hygiene technique  - Identify and instruct in appropriate isolation precautions for identified infection/condition  Outcome: Progressing     Problem: SAFETY ADULT  Goal: Patient will remain free of falls  Description: INTERVENTIONS:  - Educate patient/family on patient safety including physical limitations  - Instruct patient to call for assistance with activity   - Consult OT/PT to assist with strengthening/mobility   - Keep Call bell within reach  - Keep bed low and locked with side rails adjusted as appropriate  - Keep care items and personal belongings within reach  - Initiate and maintain comfort rounds  - Make Fall Risk Sign visible to staff  - Offer Toileting every 2 Hours, in advance of need  - Initiate/Maintain bed alarm  - Obtain necessary fall risk management equipment  - Apply yellow socks and bracelet for high fall risk patients  - Consider moving patient to room near nurses station  Outcome: Progressing  Goal: Maintain or return to baseline ADL function  Description: INTERVENTIONS:  -  Assess patient's ability to carry out ADLs; assess patient's baseline for ADL function and identify physical deficits which impact ability to perform ADLs (bathing, care of mouth/teeth, toileting, grooming, dressing, etc )  - Assess/evaluate cause of self-care deficits   - Assess range of motion  - Assess patient's mobility; develop plan if impaired  - Assess patient's need for assistive devices and provide as appropriate  - Encourage maximum independence but intervene and supervise when necessary  - Involve family in performance of ADLs  - Assess for home care needs following discharge   - Consider OT consult to assist with ADL evaluation and planning for discharge  - Provide patient education as appropriate  Outcome: Progressing  Goal: Maintains/Returns to pre admission functional level  Description: INTERVENTIONS:  - Perform BMAT or MOVE assessment daily    - Set and communicate daily mobility goal to care team and patient/family/caregiver  - Collaborate with rehabilitation services on mobility goals if consulted  - Perform Range of Motion 3 times a day  - Reposition patient every 2 hours  - Dangle patient 3 times a day  - Stand patient 3 times a day  - Ambulate patient 3 times a day  - Out of bed to chair 3 times a day   - Out of bed for meals 3 times a day  - Out of bed for toileting  - Record patient progress and toleration of activity level   Outcome: Progressing     Problem: DISCHARGE PLANNING  Goal: Discharge to home or other facility with appropriate resources  Description: INTERVENTIONS:  - Identify barriers to discharge w/patient and caregiver  - Arrange for needed discharge resources and transportation as appropriate  - Identify discharge learning needs (meds, wound care, etc )  - Arrange for interpretive services to assist at discharge as needed  - Refer to Case Management Department for coordinating discharge planning if the patient needs post-hospital services based on physician/advanced practitioner order or complex needs related to functional status, cognitive ability, or social support system  Outcome: Progressing     Problem: Knowledge Deficit  Goal: Patient/family/caregiver demonstrates understanding of disease process, treatment plan, medications, and discharge instructions  Description: Complete learning assessment and assess knowledge base    Interventions:  - Provide teaching at level of understanding  - Provide teaching via preferred learning methods  Outcome: Progressing     Problem: MOBILITY - ADULT  Goal: Maintain or return to baseline ADL function  Description: INTERVENTIONS:  -  Assess patient's ability to carry out ADLs; assess patient's baseline for ADL function and identify physical deficits which impact ability to perform ADLs (bathing, care of mouth/teeth, toileting, grooming, dressing, etc )  - Assess/evaluate cause of self-care deficits   - Assess range of motion  - Assess patient's mobility; develop plan if impaired  - Assess patient's need for assistive devices and provide as appropriate  - Encourage maximum independence but intervene and supervise when necessary  - Involve family in performance of ADLs  - Assess for home care needs following discharge   - Consider OT consult to assist with ADL evaluation and planning for discharge  - Provide patient education as appropriate  Outcome: Progressing  Goal: Maintains/Returns to pre admission functional level  Description: INTERVENTIONS:  - Perform BMAT or MOVE assessment daily    - Set and communicate daily mobility goal to care team and patient/family/caregiver  - Collaborate with rehabilitation services on mobility goals if consulted  - Perform Range of Motion 3 times a day  - Reposition patient every 2 hours  - Dangle patient 3 times a day  - Stand patient 3 times a day  - Ambulate patient 3 times a day  - Out of bed to chair 3 times a day   - Out of bed for meals 3 times a day  - Out of bed for toileting  - Record patient progress and toleration of activity level   Outcome: Progressing     Problem: Nutrition/Hydration-ADULT  Goal: Nutrient/Hydration intake appropriate for improving, restoring or maintaining nutritional needs  Description: Monitor and assess patient's nutrition/hydration status for malnutrition  Collaborate with interdisciplinary team and initiate plan and interventions as ordered  Monitor patient's weight and dietary intake as ordered or per policy  Utilize nutrition screening tool and intervene as necessary  Determine patient's food preferences and provide high-protein, high-caloric foods as appropriate       INTERVENTIONS:  - Monitor oral intake, urinary output, labs, and treatment plans  - Assess nutrition and hydration status and recommend course of action  - Evaluate amount of meals eaten  - Assist patient with eating if necessary   - Allow adequate time for meals  - Recommend/ encourage appropriate diets, oral nutritional supplements, and vitamin/mineral supplements  - Order, calculate, and assess calorie counts as needed  - Recommend, monitor, and adjust tube feedings and TPN/PPN based on assessed needs  - Assess need for intravenous fluids  - Provide specific nutrition/hydration education as appropriate  - Include patient/family/caregiver in decisions related to nutrition  Outcome: Progressing     Problem: NEUROSENSORY - ADULT  Goal: Achieves stable or improved neurological status  Description: INTERVENTIONS  - Monitor and report changes in neurological status  - Monitor vital signs such as temperature, blood pressure, glucose, and any other labs ordered   - Initiate measures to prevent increased intracranial pressure  - Monitor for seizure activity and implement precautions if appropriate      Outcome: Progressing  Goal: Remains free of injury related to seizures activity  Description: INTERVENTIONS  - Maintain airway, patient safety  and administer oxygen as ordered  - Monitor patient for seizure activity, document and report duration and description of seizure to physician/advanced practitioner  - If seizure occurs,  ensure patient safety during seizure  - Reorient patient post seizure  - Seizure pads on all 4 side rails  - Instruct patient/family to notify RN of any seizure activity including if an aura is experienced  - Instruct patient/family to call for assistance with activity based on nursing assessment  - Administer anti-seizure medications if ordered    Outcome: Progressing

## 2023-04-06 NOTE — ASSESSMENT & PLAN NOTE
· Chronic tar dive dyskinesia follows with psychiatry and neurology as an outpatient  · Continue benztropine and ingrezza    · Attempted to refill Ingrezza at retail pharmacy but can only be prescribed through specialty pharmacy by original provider

## 2023-04-06 NOTE — DISCHARGE SUMMARY
[address]  Discharge- Samantha Jackson 1965, 59 y o  female MRN: [id number]  Unit/Bed#: E5 -01 Encounter: [id number]  Primary Care Provider: Parviz Mcdermott DO   Date and time admitted to hospital: 3/23/2023  9:35 PM    Admitting Provider:  No admitting provider for patient encounter  Discharge Provider:  Dwain Araujo DO  Admission Date: 3/23/2023       Discharge Date: 04/06/23   LOS: 13  Primary Care Physician at Discharge: Parviz Mcdermott [phone]    DISCHARGE DIAGNOSES  * Small bowel obstruction [facility])  Assessment & Plan  Background: Patient presented to the ED following multiple episodes of vomiting at home found to have high-grade small bowel obstruction  · Was followed by surgery  NG tube was unsuccessful in placement  · Eventually diet was started and tolerated  · Repeat imaging subsequently demonstrated contrast in the right colon and has had bowel movements  · Disposition: Initially awaiting rehab placement but now cleared for discharge home    Witnessed seizure-like activity [facility])  Assessment & Plan  · Day of planned discharge 4/1/23 the patient had a fall and subsequently seizure-like activity which was witnessed  · Neurology consulted  History of nonepileptic seizures in the past   · Seen by neurology this admission  · EEG this admission negative and keppra has been discontinued    Hypokalemia  Assessment & Plan  · Repleted    Mixed hyperlipidemia  Assessment & Plan  · Continue atorvastatin    Tardive dyskinesia  Assessment & Plan  · Chronic tar dive dyskinesia follows with psychiatry and neurology as an outpatient  · Continue benztropine and ingrezza  · Attempted to refill Ingrezza at retail pharmacy but can only be prescribed through specialty pharmacy by original provider    Essential hypertension  Assessment & Plan  · Blood pressure stable on amlodipine    Bipolar disorder (HCC)  Assessment & Plan  · Mood stable    · Continue home regimen of quetiapine 50 mg 3 times daily, lithium 300 mg at bedtime, paroxetine 30 mg twice daily and 60 mg at bedtime, and buspirone    Chronic pain disorder  Assessment & Plan  · Continue pregabalin 50 mg  · X-ray of hips and femur bilaterally does not demonstrate acute bony fracture    REASON FOR ADMISSION  Abdominal Pain (Pt reports abdominal pain with N/V/D since this am  No known sick contacts  Zofran administered by EMS) and Chest Pain (Pt reports chest pain, 4 morphine given by EMS)    HOSPITAL COURSE:  Melody D Beryle Reichmann is a 59 y o  female with a history of mood disorder hypertension nonepileptic seizures and pain who presented to the hospital abdominal pain  She was found to have bowel obstruction  She was seen by surgery  Eventually this resolved  She was planned to discharge to rehab but then on day of pickup she fell and had seizure-like activity  She was seen by neurology  EEG was ordered which was negative  She likely had nonepileptic seizures and any AEDs were discontinued  She is being discharged home in good condition now that she has been cleared by PT/OT  Attempt was made to fill the patient's Ingrezza  This was unsuccessful as this is a specialty medication and not available in our retail pharmacy  Care was discussed with the patient's daughter during hospitalization and voicemail was left prior to discharge    Please see problem list listed above  CONSULTING PROVIDERS   IP CONSULT TO ACUTE CARE SURGERY  IP CONSULT TO NEUROLOGY    PROCEDURES PERFORMED  * No surgery found *    RADIOLOGY RESULTS  XR hips bilateral 2 vw w pelvis if performed    Result Date: 4/5/2023  Impression: No acute osseous abnormality  Workstation performed: [id number]     XR knee 1 or 2 vw right    Result Date: 3/31/2023  Impression: No acute osseous abnormality   Stable satisfactory appearance of knee prosthesis Workstation performed: [id number]       Ambulatory EEG 48 Hours    Result Date: 3/23/2023  Narrative: There are no epileptiform discharges over the course of more than 47 hours ambulatory EEG  MD [facility]       CT abdomen pelvis w contrast    Result Date: 3/28/2023  Impression: Findings suggestive of ileus versus partial small bowel obstruction  Partially decompressed distal small bowel loops without evidence of discrete transition point   Workstation performed: [id number]       LABS  Results from last 7 days   Lab Units 04/05/23  0433 04/02/23  0520 04/01/23  1319   WBC Thousand/uL 5 10 4 60 6 17   HEMOGLOBIN g/dL 12 2 12 2 13 9   HEMATOCRIT % 37 9 38 8 43 2   MCV fL 91 95 92   TOTAL NEUT ABS Thousand/uL  --   --  2 65   PLATELETS Thousands/uL 285 304 325     Results from last 7 days   Lab Units 04/05/23  0433 04/02/23  0520 04/01/23  1319 04/01/23  0520 03/31/23  0533   SODIUM mmol/L 142 140 139  --  141   POTASSIUM mmol/L 4 1 4 0 3 9 3 6 3 4*   CHLORIDE mmol/L 109* 109* 104  --  106   CO2 mmol/L 29 24 24  --  27   BUN mg/dL 11 7 9  --  6   CREATININE mg/dL 0 87 0 72 1 03  --  0 82   CALCIUM mg/dL 8 8 8 5 8 7  --  8 0*   ALBUMIN g/dL 3 7  --   --   --   --    TOTAL BILIRUBIN mg/dL 0 92  --   --   --   --    ALK PHOS U/L 61  --   --   --   --    ALT U/L 10  --   --   --   --    AST U/L 14  --   --   --   --    EGFR ml/min/1 73sq m 73 91 59  --  78   GLUCOSE RANDOM mg/dL 90 76 104  --  85     Results from last 7 days   Lab Units 04/01/23  1319   CK TOTAL U/L 57                  Results from last 7 days   Lab Units 04/01/23  1304   POC GLUCOSE mg/dl 109             Results from last 7 days   Lab Units 04/01/23  1754 04/01/23  1319   LACTIC ACID mmol/L 0 9 4 6*           Cultures:           Results from last 7 days   Lab Units 03/31/23  1404   INFLUENZA A PCR  Negative     Results from last 7 days   Lab Units 03/31/23  1404   SARS-COV-2  Negative   INFLUENZA A PCR  Negative   INFLUENZA B PCR  Negative   RSV PCR  Negative             DISCHARGE DAY VISIT "AND PHYSICAL EXAM:  Subjective: Patient seen and examined  No complaints  Asking about Ingrezza    Vitals:   Blood Pressure: 109/80 (04/06/23 0802)  Pulse: 72 (04/06/23 0802)  Temperature: 98 3 °F (36 8 °C) (04/06/23 0706)  Temp Source: Oral (04/06/23 0706)  Respirations: 19 (04/06/23 0706)  Height: 5' 1\" (154 9 cm) (04/01/23 1443)  Weight - Scale: 72 5 kg (159 lb 13 3 oz) (03/24/23 0130)  SpO2: 98 % (04/06/23 0802)    Physical Exam  Vitals reviewed  Constitutional:       General: She is not in acute distress  Appearance: Normal appearance  HENT:      Head: Atraumatic  Eyes:      Extraocular Movements: Extraocular movements intact  Cardiovascular:      Rate and Rhythm: Regular rhythm  Pulmonary:      Breath sounds: Decreased breath sounds present  No wheezing  Abdominal:      General: Bowel sounds are normal       Palpations: Abdomen is soft  Tenderness: There is no guarding or rebound  Musculoskeletal:         General: No swelling  Skin:     General: Skin is warm  Neurological:      Mental Status: She is alert  Mental status is at baseline  Psychiatric:      Comments: Tar dive dyskinesia was noted       Planned Re-admission: No  Discharge Disposition: Home/Self Care    Test Results Pending at Discharge:   Pending Labs     Order Current Status    Path Slide Review In process      Incidental findings:     Medications   · Discharge Medication List: See after visit summary for reconciled discharge medications  Diet restrictions: Regular fluid restricted diet    Activity restrictions: No strenuous activity  Discharge Condition: stable    Outpatient Follow-Up and Discharge Instructions  See after visit summary section titled Discharge Instructions for information provided to patient and family  Code Status: Level 1 - Full Code  Discharge Statement   I spent 35 minutes discharging the patient  This time was spent on the day of discharge   Greater than 50% of total time was spent with " the patient and / or family counseling and / or coordination of care  ** Please Note: This note has been constructed using a voice recognition system   **

## 2023-04-06 NOTE — UTILIZATION REVIEW
NOTIFICATION OF ADMISSION DISCHARGE   This is a Notification of Discharge from [address] [facility]  Please be advised that this patient has been discharge from our facility  Below you will find the admission and discharge date and time including the patient’s disposition  UTILIZATION REVIEW CONTACT:  Alexandra Peoples  Utilization   Network Utilization Review Department  Phone: [phone] x carefully listen to the prompts  All voicemails are confidential   Email: [email or web address]     ADMISSION INFORMATION  PRESENTATION DATE: 3/23/2023  9:35 PM  OBERVATION ADMISSION DATE:   INPATIENT ADMISSION DATE: 3/24/23  1:29 AM   DISCHARGE DATE: 4/6/2023  1:12 PM   DISPOSITION:Home with Home Health Care    IMPORTANT INFORMATION:  Send all requests for admission clinical reviews, approved or denied determinations and any other requests to dedicated fax number below belonging to the campus where the patient is receiving treatment   List of dedicated fax numbers:  [address] DENIALS (Administrative/Medical Necessity) [phone]   [address] (Maternity/NICU/Pediatrics) [phone]   [facility] [phone]   [place] [address] [phone]   [address] [phone]   [address] [facility] [phone]   [facility] [phone]   [address] [place] [address] [phone]   [facility]  [phone]   [address] [facility] [phone]   [address] [facility] [address] [facility] [phone]

## 2023-04-06 NOTE — PLAN OF CARE
Problem: Potential for Falls  Goal: Patient will remain free of falls  Description: INTERVENTIONS:  - Educate patient/family on patient safety including physical limitations  - Instruct patient to call for assistance with activity   - Consult OT/PT to assist with strengthening/mobility   - Keep Call bell within reach  - Keep bed low and locked with side rails adjusted as appropriate  - Keep care items and personal belongings within reach  - Initiate and maintain comfort rounds  - Make Fall Risk Sign visible to staff  - Offer Toileting every 2 Hours, in advance of need  - Initiate/Maintain bed alarm  - Obtain necessary fall risk management equipment  - Apply yellow socks and bracelet for high fall risk patients  - Consider moving patient to room near nurses station  Outcome: Progressing     Problem: PAIN - ADULT  Goal: Verbalizes/displays adequate comfort level or baseline comfort level  Description: Interventions:  - Encourage patient to monitor pain and request assistance  - Assess pain using appropriate pain scale  - Administer analgesics based on type and severity of pain and evaluate response  - Implement non-pharmacological measures as appropriate and evaluate response  - Consider cultural and social influences on pain and pain management  - Notify physician/advanced practitioner if interventions unsuccessful or patient reports new pain  Outcome: Progressing     Problem: INFECTION - ADULT  Goal: Absence or prevention of progression during hospitalization  Description: INTERVENTIONS:  - Assess and monitor for signs and symptoms of infection  - Monitor lab/diagnostic results  - Monitor all insertion sites, i e  indwelling lines, tubes, and drains  - Monitor endotracheal if appropriate and nasal secretions for changes in amount and color  - [place] appropriate cooling/warming therapies per order  - Administer medications as ordered  - Instruct and encourage patient and family to use good hand hygiene technique  - Identify and instruct in appropriate isolation precautions for identified infection/condition  Outcome: Progressing     Problem: SAFETY ADULT  Goal: Patient will remain free of falls  Description: INTERVENTIONS:  - Educate patient/family on patient safety including physical limitations  - Instruct patient to call for assistance with activity   - Consult OT/PT to assist with strengthening/mobility   - Keep Call bell within reach  - Keep bed low and locked with side rails adjusted as appropriate  - Keep care items and personal belongings within reach  - Initiate and maintain comfort rounds  - Make Fall Risk Sign visible to staff  - Offer Toileting every 2 Hours, in advance of need  - Initiate/Maintain bed alarm  - Obtain necessary fall risk management equipment  - Apply yellow socks and bracelet for high fall risk patients  - Consider moving patient to room near nurses station  Outcome: Progressing  Goal: Maintain or return to baseline ADL function  Description: INTERVENTIONS:  -  Assess patient's ability to carry out ADLs; assess patient's baseline for ADL function and identify physical deficits which impact ability to perform ADLs (bathing, care of mouth/teeth, toileting, grooming, dressing, etc )  - Assess/evaluate cause of self-care deficits   - Assess range of motion  - Assess patient's mobility; develop plan if impaired  - Assess patient's need for assistive devices and provide as appropriate  - Encourage maximum independence but intervene and supervise when necessary  - Involve family in performance of ADLs  - Assess for home care needs following discharge   - Consider OT consult to assist with ADL evaluation and planning for discharge  - Provide patient education as appropriate  Outcome: Progressing     Problem: DISCHARGE PLANNING  Goal: Discharge to home or other facility with appropriate resources  Description: INTERVENTIONS:  - Identify barriers to discharge w/patient and caregiver  - Arrange for needed discharge resources and transportation as appropriate  - Identify discharge learning needs (meds, wound care, etc )  - Arrange for interpretive services to assist at discharge as needed  - Refer to Case Management Department for coordinating discharge planning if the patient needs post-hospital services based on physician/advanced practitioner order or complex needs related to functional status, cognitive ability, or social support system  Outcome: Progressing     Problem: Knowledge Deficit  Goal: Patient/family/caregiver demonstrates understanding of disease process, treatment plan, medications, and discharge instructions  Description: Complete learning assessment and assess knowledge base  Interventions:  - Provide teaching at level of understanding  - Provide teaching via preferred learning methods  Outcome: Progressing     Problem: MOBILITY - ADULT  Goal: Maintain or return to baseline ADL function  Description: INTERVENTIONS:  -  Assess patient's ability to carry out ADLs; assess patient's baseline for ADL function and identify physical deficits which impact ability to perform ADLs (bathing, care of mouth/teeth, toileting, grooming, dressing, etc )  - Assess/evaluate cause of self-care deficits   - Assess range of motion  - Assess patient's mobility; develop plan if impaired  - Assess patient's need for assistive devices and provide as appropriate  - Encourage maximum independence but intervene and supervise when necessary  - Involve family in performance of ADLs  - Assess for home care needs following discharge   - Consider OT consult to assist with ADL evaluation and planning for discharge  - Provide patient education as appropriate  Outcome: Progressing     Problem: Nutrition/Hydration-ADULT  Goal: Nutrient/Hydration intake appropriate for improving, restoring or maintaining nutritional needs  Description: Monitor and assess patient's nutrition/hydration status for malnutrition   Collaborate with interdisciplinary team and initiate plan and interventions as ordered  Monitor patient's weight and dietary intake as ordered or per policy  Utilize nutrition screening tool and intervene as necessary  Determine patient's food preferences and provide high-protein, high-caloric foods as appropriate       INTERVENTIONS:  - Monitor oral intake, urinary output, labs, and treatment plans  - Assess nutrition and hydration status and recommend course of action  - Evaluate amount of meals eaten  - Assist patient with eating if necessary   - Allow adequate time for meals  - Recommend/ encourage appropriate diets, oral nutritional supplements, and vitamin/mineral supplements  - Order, calculate, and assess calorie counts as needed  - Recommend, monitor, and adjust tube feedings and TPN/PPN based on assessed needs  - Assess need for intravenous fluids  - Provide specific nutrition/hydration education as appropriate  - Include patient/family/caregiver in decisions related to nutrition  Outcome: Progressing     Problem: NEUROSENSORY - ADULT  Goal: Achieves stable or improved neurological status  Description: INTERVENTIONS  - Monitor and report changes in neurological status  - Monitor vital signs such as temperature, blood pressure, glucose, and any other labs ordered   - Initiate measures to prevent increased intracranial pressure  - Monitor for seizure activity and implement precautions if appropriate      Outcome: Progressing  Goal: Remains free of injury related to seizures activity  Description: INTERVENTIONS  - Maintain airway, patient safety  and administer oxygen as ordered  - Monitor patient for seizure activity, document and report duration and description of seizure to physician/advanced practitioner  - If seizure occurs,  ensure patient safety during seizure  - Reorient patient post seizure  - Seizure pads on all 4 side rails  - Instruct patient/family to notify RN of any seizure activity including if an aura is experienced  - Instruct patient/family to call for assistance with activity based on nursing assessment  - Administer anti-seizure medications if ordered    Outcome: Progressing

## 2023-04-07 ENCOUNTER — HOME CARE VISIT (OUTPATIENT)
Dept: HOME HEALTH SERVICES | Facility: HOME HEALTHCARE | Age: 58
End: 2023-04-07

## 2023-04-25 NOTE — PROGRESS NOTES
Sleep Study Documentation    Pre-Sleep Study       Sleep testing procedure explained to patient:YES    Patient napped prior to study:NO    Caffeine:Dayshift worker after 12PM   Caffeine use:NO    Alcohol:Dayshift workers after 5PM: Alcohol use:NO    Typical day for patient:  NO  (Pt stated today was hectic as she had a couple doctors' appointments and day programs )     Study Documentation    Sleep Study Indications:    Impaired concentration/memory   Nightmares   BMI 40 0-44 9 (adult)   Chronic pain disorder   Hypertension    Sleep Study: Diagnostic       Snore:  Mild snoring & heavy breathing noted at times  Supplemental O2: no    Minimum SaO2:  90%  Baseline SaO2:   95 4%    TcpCO2 in place & utilized entire study (BMI>40)  EKG abnormalities: no     EEG abnormalities: YES  (Sharp activity noted--see epochs 056-919, 359-790)    Sleep Study Recorded < 2 hours: N/A    Sleep Study Recorded > 2 hours but incomplete study: N/A    Sleep Study Recorded 6 hours but no sleep obtained: NO    Patient classification: disabled       Post-Sleep Study    Medication used at bedtime or during sleep study:YES other prescription medications    Patient reports time it took to fall asleep:greater than 60 minutes    Patient reports waking up during study:3 or more times  Patient reports returning to sleep in greater than 30 minutes  Patient reports sleeping 2 to 4 hours with dreaming  Patient reports sleep during study:typical    Patient rated sleepiness: Somewhat sleepy or tired    PAP treatment:no  Klisyri Counseling:  I discussed with the patient the risks of Klisyri including but not limited to erythema, scaling, itching, weeping, crusting, and pain.

## 2023-04-26 ENCOUNTER — PATIENT OUTREACH (OUTPATIENT)
Dept: INTERNAL MEDICINE CLINIC | Facility: CLINIC | Age: 58
End: 2023-04-26

## 2023-04-26 ENCOUNTER — OFFICE VISIT (OUTPATIENT)
Dept: INTERNAL MEDICINE CLINIC | Facility: CLINIC | Age: 58
End: 2023-04-26

## 2023-04-26 ENCOUNTER — OFFICE VISIT (OUTPATIENT)
Dept: OBGYN CLINIC | Facility: CLINIC | Age: 58
End: 2023-04-26

## 2023-04-26 VITALS
SYSTOLIC BLOOD PRESSURE: 120 MMHG | WEIGHT: 155 LBS | BODY MASS INDEX: 29.27 KG/M2 | HEART RATE: 81 BPM | DIASTOLIC BLOOD PRESSURE: 84 MMHG | TEMPERATURE: 97.3 F | OXYGEN SATURATION: 100 % | HEIGHT: 61 IN

## 2023-04-26 VITALS
SYSTOLIC BLOOD PRESSURE: 95 MMHG | HEART RATE: 96 BPM | WEIGHT: 160.2 LBS | BODY MASS INDEX: 30.25 KG/M2 | HEIGHT: 61 IN | DIASTOLIC BLOOD PRESSURE: 65 MMHG

## 2023-04-26 DIAGNOSIS — G89.4 CHRONIC PAIN DISORDER: ICD-10-CM

## 2023-04-26 DIAGNOSIS — E87.6 HYPOKALEMIA: ICD-10-CM

## 2023-04-26 DIAGNOSIS — K56.609 SMALL BOWEL OBSTRUCTION (HCC): Primary | ICD-10-CM

## 2023-04-26 DIAGNOSIS — R26.2 AMBULATORY DYSFUNCTION: ICD-10-CM

## 2023-04-26 DIAGNOSIS — K59.09 OTHER CONSTIPATION: ICD-10-CM

## 2023-04-26 DIAGNOSIS — Z01.419 ROUTINE GYNECOLOGICAL EXAMINATION: Primary | ICD-10-CM

## 2023-04-26 DIAGNOSIS — E78.2 MIXED HYPERLIPIDEMIA: ICD-10-CM

## 2023-04-26 DIAGNOSIS — F31.60 BIPOLAR AFFECTIVE DISORDER, CURRENT EPISODE MIXED, CURRENT EPISODE SEVERITY UNSPECIFIED (HCC): ICD-10-CM

## 2023-04-26 DIAGNOSIS — Z12.31 ENCOUNTER FOR SCREENING MAMMOGRAM FOR MALIGNANT NEOPLASM OF BREAST: ICD-10-CM
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
## 2023-04-26 DIAGNOSIS — I10 ESSENTIAL HYPERTENSION: ICD-10-CM

## 2023-04-26 DIAGNOSIS — G43.809 OTHER MIGRAINE WITHOUT STATUS MIGRAINOSUS, NOT INTRACTABLE: ICD-10-CM

## 2023-04-26 DIAGNOSIS — G24.01 TARDIVE DYSKINESIA: ICD-10-CM

## 2023-04-26 DIAGNOSIS — R56.9 WITNESSED SEIZURE-LIKE ACTIVITY (HCC): ICD-10-CM

## 2023-04-26 DIAGNOSIS — Z12.11 SCREEN FOR COLON CANCER: ICD-10-CM

## 2023-04-26 PROBLEM — Z01.818 PREOPERATIVE EVALUATION TO RULE OUT SURGICAL CONTRAINDICATION: Status: RESOLVED | Noted: 2022-02-28 | Resolved: 2023-04-26

## 2023-04-26 PROBLEM — G89.29 CHRONIC BACK PAIN: Status: RESOLVED | Noted: 2021-05-02 | Resolved: 2023-04-26

## 2023-04-26 PROBLEM — F11.20 OPIOID DEPENDENCE (HCC): Status: RESOLVED | Noted: 2017-02-28 | Resolved: 2023-04-26

## 2023-04-26 PROBLEM — Z79.891 ENCOUNTER FOR LONG-TERM USE OF OPIATE ANALGESIC: Status: RESOLVED | Noted: 2019-12-03 | Resolved: 2023-04-26

## 2023-04-26 PROBLEM — M46.1 SACROILIITIS (HCC): Status: RESOLVED | Noted: 2017-02-28 | Resolved: 2023-04-26

## 2023-04-26 PROBLEM — T81.89XA IMPAIRED SKIN INTEGRITY ASSOCIATED WITH SURGICAL INCISION: Status: RESOLVED | Noted: 2020-07-20 | Resolved: 2023-04-26

## 2023-04-26 PROBLEM — M51.16 LUMBAR DISC DISEASE WITH RADICULOPATHY: Status: RESOLVED | Noted: 2018-02-02 | Resolved: 2023-04-26

## 2023-04-26 PROBLEM — M25.552 BILATERAL HIP PAIN: Status: RESOLVED | Noted: 2017-06-19 | Resolved: 2023-04-26

## 2023-04-26 PROBLEM — R23.9 IMPAIRED SKIN INTEGRITY ASSOCIATED WITH SURGICAL INCISION: Status: RESOLVED | Noted: 2020-07-20 | Resolved: 2023-04-26

## 2023-04-26 PROBLEM — M54.9 CHRONIC BACK PAIN: Status: RESOLVED | Noted: 2021-05-02 | Resolved: 2023-04-26

## 2023-04-26 PROBLEM — R11.0 NAUSEA: Status: RESOLVED | Noted: 2021-05-07 | Resolved: 2023-04-26

## 2023-04-26 PROBLEM — D72.819 LEUKOPENIA: Status: RESOLVED | Noted: 2021-07-18 | Resolved: 2023-04-26

## 2023-04-26 PROBLEM — S92.301A MULTIPLE CLOSED FRACTURES OF METATARSAL BONE OF RIGHT FOOT: Status: RESOLVED | Noted: 2021-05-02 | Resolved: 2023-04-26

## 2023-04-26 PROBLEM — M17.0 PRIMARY OSTEOARTHRITIS OF BOTH KNEES: Status: RESOLVED | Noted: 2020-05-01 | Resolved: 2023-04-26

## 2023-04-26 PROBLEM — Z96.652 STATUS POST TOTAL KNEE REPLACEMENT, LEFT: Status: RESOLVED | Noted: 2022-07-05 | Resolved: 2023-04-26

## 2023-04-26 PROBLEM — M17.11 ARTHRITIS OF RIGHT KNEE: Status: RESOLVED | Noted: 2020-10-06 | Resolved: 2023-04-26

## 2023-04-26 PROBLEM — L90.5 SCAR OF BACK: Status: RESOLVED | Noted: 2020-07-20 | Resolved: 2023-04-26

## 2023-04-26 PROBLEM — M25.551 BILATERAL HIP PAIN: Status: RESOLVED | Noted: 2017-06-19 | Resolved: 2023-04-26

## 2023-04-26 PROBLEM — Z80.0 FAMILY HISTORY OF COLORECTAL CANCER: Status: RESOLVED | Noted: 2019-12-11 | Resolved: 2023-04-26

## 2023-04-26 PROBLEM — L08.9 SUPERFICIAL BACTERIAL INFECTION OF SKIN: Status: RESOLVED | Noted: 2020-07-20 | Resolved: 2023-04-26

## 2023-04-26 PROBLEM — Z96.651 S/P TOTAL KNEE ARTHROPLASTY, RIGHT: Status: RESOLVED | Noted: 2022-03-10 | Resolved: 2023-04-26

## 2023-04-26 PROBLEM — Z00.8 MEDICAL CLEARANCE FOR PSYCHIATRIC ADMISSION: Status: RESOLVED | Noted: 2021-07-16 | Resolved: 2023-04-26

## 2023-04-26 PROBLEM — M17.0 PRIMARY LOCALIZED OSTEOARTHRITIS OF BOTH KNEES: Status: RESOLVED | Noted: 2017-06-16 | Resolved: 2023-04-26

## 2023-04-26 PROBLEM — R21 RASH: Status: RESOLVED | Noted: 2020-06-03 | Resolved: 2023-04-26

## 2023-04-26 PROBLEM — B96.89 SUPERFICIAL BACTERIAL INFECTION OF SKIN: Status: RESOLVED | Noted: 2020-07-20 | Resolved: 2023-04-26

## 2023-04-26 PROBLEM — M22.2X1 PATELLOFEMORAL DISORDER OF BOTH KNEES: Status: RESOLVED | Noted: 2020-05-01 | Resolved: 2023-04-26

## 2023-04-26 PROBLEM — M22.2X2 PATELLOFEMORAL DISORDER OF BOTH KNEES: Status: RESOLVED | Noted: 2020-05-01 | Resolved: 2023-04-26

## 2023-04-26 PROBLEM — R41.89: Status: RESOLVED | Noted: 2020-02-13 | Resolved: 2023-04-26

## 2023-04-26 RX ORDER — BUTALBITAL, ACETAMINOPHEN AND CAFFEINE 300; 40; 50 MG/1; MG/1; MG/1
1 CAPSULE ORAL DAILY PRN
Qty: 10 CAPSULE | Refills: 0 | Status: SHIPPED | OUTPATIENT
Start: 2023-04-26

## 2023-04-26 RX ORDER — LORAZEPAM 0.5 MG/1
TABLET ORAL
COMMUNITY
Start: 2023-04-25

## 2023-04-26 RX ORDER — DOCUSATE SODIUM 100 MG/1
100 CAPSULE, LIQUID FILLED ORAL 2 TIMES DAILY
Qty: 60 CAPSULE | Refills: 2 | Status: SHIPPED | OUTPATIENT
Start: 2023-04-26 | End: 2023-04-26

## 2023-04-26 RX ORDER — DOCUSATE SODIUM 100 MG/1
100 CAPSULE, LIQUID FILLED ORAL 2 TIMES DAILY
Qty: 60 CAPSULE | Refills: 2 | Status: SHIPPED | OUTPATIENT
Start: 2023-04-26

## 2023-04-26 NOTE — ASSESSMENT & PLAN NOTE
Chronic constipation  Add more fiber to diet  Increase hydration  Restart Colace twice daily as needed

## 2023-04-26 NOTE — PROGRESS NOTES
Outpatient Care Management Note:    Patient at PCP office today for hospital follow up appointment  I met with patient and her son in law/paid caregiver Annel after appointment  Patient reports overall she is doing well but does complain of headaches at times  Patient is scheduled to follow up with Neurology office on 7/10/23  Per son in law patient had a mental health appointment yesterday and will be getting bloodwork completed  He reports he has a list of medication they are prescribing her and requested he bring a copy of medication list to PCP office  Unfortunately they did not bring any medication with them to  Family is trying to help the best they can and as much as patient will allow with managing her medication  I spoke with them about bubble packing again and patient reports interest and they will further discuss with patient's daughter  I did give him two pharmacies that offer bubble packing and deliver    [facility]     [address]    Phone: [address]  [place], [address] [facility]    Phone: [phone]    Requested son in law to call me if they decide to go through with the bubble packing so I can update pharmacy in chart and send medication list and demographics to new pharmacy  I did explain to them the process of getting setup for bubble packing  They are aware of GYN appointment later today at 3:30 pm      Per son in law he will be working on scheduling GI appointment for colonoscopy, ENT and audiology follow up  He also reports patient planning on doing outpatient physical therapy  Patient ambulating at home and able to do steps  Patient has walker with her today and is requesting a rollator  I did make provider aware and she will place order in parachute  Does not appear order was placed in parachute when requested back in February of this year  Patient unsure when she received last walker  Aware insurance may not cover if it has been within the last 5 years and to further talk with DME company when they reach out regarding coverage or any out of pocket cost      Patient and son in law do not have any further questions, concerns, or other needs at this time  They have my contact # and PCP office # if needed  They have agreeable for further outreach  Aaronamy Grider (son in law) - phone# [phone]  Jameson Guillen (daughter) - phone # [phone]    Patient completed new communication consent form and phone numbers updated for family in chart

## 2023-04-26 NOTE — ASSESSMENT & PLAN NOTE
Patient attends [facility] psychiatry  Most recent appointment was yesterday  Patient tries to manage medications on her own  Unclear whether or not she is compliant with her medications  She is not able to verify her medication list  She forgot to bring in her medications with her today  Her son in law will try to bring it another day

## 2023-04-26 NOTE — ASSESSMENT & PLAN NOTE
She was followed by surgery during her hospital course  Her symptoms resolved and she was able to start a diet which she tolerated  Subsequent imaging demonstrated contrast in the right colon and she had regular bowel movements the remainder of her hospital stay  Appears to be doing well today

## 2023-04-26 NOTE — ASSESSMENT & PLAN NOTE
Her son in law states they will call today to set up her PT/OT sessions  She was unable to schedule the services in the home due to family scheduling issues  She also needs a new walker  A rollator was ordered through [employer] today

## 2023-04-26 NOTE — ASSESSMENT & PLAN NOTE
Fall with subsequent seizure-like activity  Neurology was consulted and it was deemed non-epileptic  No further follow up recommended at this time

## 2023-04-26 NOTE — PROGRESS NOTES
Margaret Grady is a 59 y o  female who presents today for annual GYN exam   Her last pap smear was performed many years ago and result was negative  She reports no history of abnormal pap smears in her past   Her last mammogram was performed few years ago and result was negative  She has not had colon cancer screening performed   No LMP recorded  Patient has had a hysterectomy  Her general medical history has been reviewed and she reports it as follows:    Past Medical History:   Diagnosis Date   • Anxiety    • Arthritis     left knee   • Arthritis of right knee 10/6/2020   • At risk for falls    • Bipolar 2 disorder (HCC)     FOLLOWS WITH PSYCHIATRIST  CONTINUE LAMOTRIGINE; RESOLVED: [id number]   • Depression    • Familial tremor     both hands   • Fibromyalgia     LAST ASSESSED: [id number]   • GERD (gastroesophageal reflux disease)    • Hearing aid worn     left ear   • [place] (hard of hearing)     left ear   • Hyperlipidemia    • Hypertension    • Left-sided weakness    • Lumbar disc disease with radiculopathy 2/2/2018   • Memory loss of unknown cause     long and short term   • Migraine    • Multiple closed fractures of metatarsal bone of right foot 5/2/2021   • Obesity    • Obesity, Class II, BMI 35-39 9    • Osteoarthritis of both hips 10/31/2016   • Osteoarthritis of knee 2/20/2013    Description: Continue Tylenol and Naproxen  Encourage exercise and weight loss  Patient refused physical therapy  I will refer the patient back to Orthopedics     • Overactive bladder    • Panic attack    • Patellofemoral disorder of both knees 5/1/2020   • Post traumatic stress disorder    • Primary localized osteoarthritis of both knees 6/16/2017   • Primary osteoarthritis of both knees 5/1/2020   • S/P insertion of spinal cord stimulator     no remote   • S/P total knee arthroplasty, right 3/10/2022   • Sacroiliitis ([facility] [address] ) 2/28/2017   • Seasonal allergies    • Seizure-like activity ([facility] [address] ) 6/3/2022   • Seizures ([address] )     possible seizure like activity   • Status post total knee replacement, left 2022   • Stroke [facility])     questionable stroke    • Tardive dyskinesia     PATIENT STATES   • 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    CONT ASA 81 MG DAILY; RESOLVED: [id number]   • Urinary incontinence    • Uses walker    • Wears dentures     partial lower / full upper- doesnt wear   • Wears glasses      Past Surgical History:   Procedure Laterality Date   • BACK SURGERY     • [address]   • COLONOSCOPY      RESOLVED: [id number]   • EAR SURGERY     • EGD     • HYSTERECTOMY     • MYRINGOTOMY W/ TUBES Left    • NECK SURGERY  2019   • [place] ARTHRP KNE CONDYLE&PLATU MEDIAL&LAT COMPARTMENTS Right 3/9/2022    Procedure: ARTHROPLASTY KNEE TOTAL;  Surgeon: Hanna Cook DO;  [facility];  Service: Orthopedics   • [place] ARTHRP KNE CONDYLE&PLATU MEDIAL&LAT COMPARTMENTS Left 2022    Procedure: ARTHROPLASTY KNEE TOTAL;  Surgeon: Hanna Cook DO;  [facility];  Service: Orthopedics   • [place] CYSTOURETHROSCOPY N/A 2016    Procedure: CYSTOSCOPY, BOTOX INJECTION;  Surgeon: Erasto Zhou MD;  [facility];  Service: Gynecology   • [place] INSJ/RPLCMT SPI NPGR DIR/INDUXIVE COUPLING Right 2/10/2021    Procedure: REPLACEMENT IMPLANTABLE PULSE GENERATOR DORSAL SPINAL COLUMN STIMULATOR, RIGHT;  Surgeon: Latonya Mcintyre MD;  [facility];  Service: Neurosurgery   • [place] PRQ  [address] NSTIM ELECTRODE ARRAY EPIDURAL Right 2020    Procedure: INSERTION THORACIC DORSAL COLUMN SPINAL CORD STIMULATOR PERCUTANEOUS W IMPLANTABLE PULSE GENERATOR, RIGHT;  Surgeon: Latonya Mcintyre MD;  [facility];  Service: Neurosurgery   • TONSILLECTOMY     • TUBAL LIGATION     • UPPER GASTROINTESTINAL ENDOSCOPY  2020     OB History        6    Para   6    Term                AB        Living           SAB        IAB        Ectopic Multiple        Live Births                   Social History     Tobacco Use   • Smoking status: Never   • Smokeless tobacco: Never   Vaping Use   • Vaping Use: Never used   Substance Use Topics   • Alcohol use: Not Currently     Comment: 2 x year; being a social drinker as per all scripts    • Drug use: No     Social History     Substance and Sexual Activity   Sexual Activity Not Currently     Cancer-related family history includes Breast cancer in her maternal aunt; Colon cancer in her brother, maternal aunt, and mother  Current Outpatient Medications   Medication Instructions   • acetaminophen (ACETAMINOPHEN EXTRA STRENGTH) 500 mg, Oral, Every 6 hours PRN   • amLODIPine (NORVASC) 10 mg tablet TAKE 1 TABLET BY MOUTH EVERY DAY   • aspirin (ECOTRIN LOW STRENGTH) 81 mg, Oral, Daily   • atorvastatin (LIPITOR) 40 mg tablet TAKE 1 TABLET BY MOUTH EVERY DAY   • benztropine (COGENTIN) 1 mg, Oral, 2 times daily   • busPIRone (BUSPAR) 20 mg, Oral, 3 times daily   • Butalbital-APAP-Caffeine (Fioricet) -40 MG CAPS 1 tablet, Oral, Daily PRN   • docusate sodium (COLACE) 100 mg, Oral, 2 times daily   • ferrous sulfate 324 mg, Oral, Daily   • folic acid (FOLVITE) 1 mg tablet TAKE 1 TABLET BY MOUTH EVERY DAY   • hydrOXYzine HCL (ATARAX) 50 mg, Oral, 3 times daily PRN   • Ingrezza 40 mg, Oral, Daily   • lithium carbonate (LITHOBID) 300 mg CR tablet TAKE 1 TABLET BY MOUTH DAILY AT BEDTIME   • LORazepam (ATIVAN) 0 5 mg tablet No dose, route, or frequency recorded  • mirtazapine (REMERON) 7 5 MG tablet TAKE 1 TABLET BY MOUTH EVERY EVENING  • Multiple Vitamin (multivitamin) tablet 1 tablet, Oral, Daily   • pantoprazole (PROTONIX) 20 mg, Oral, Daily   • PARoxetine (PAXIL) 60 mg, Oral, 2 times daily, Takes two (60 mg) tabs by mouth nightly    • prazosin (MINIPRESS) 2 mg, Oral, Daily at bedtime   • pregabalin (LYRICA) 50 mg capsule 1 capsule twice a day  May increase to 1 capsule 3 times per day if needed     • QUEtiapine "(SEROquel) 50 mg tablet TAKE 1 TAB (50 MG) BY MOUTH 3 TIMES A DAY  (9 AM, 4 PM, AND AT BEDTIME)       Review of Systems:  Review of Systems   All other systems reviewed and are negative  Physical Exam:  BP 95/65   Pulse 96   Ht 5' 1\" (1 549 m)   Wt 72 7 kg (160 lb 3 2 oz)   BMI 30 27 kg/m²   Physical Exam  Constitutional:       Appearance: Normal appearance  Genitourinary:      Bladder and urethral meatus normal       Right Labia: No rash, tenderness, lesions or skin changes  Left Labia: No tenderness, lesions, skin changes or rash  No inguinal adenopathy present in the right or left side  Vaginal cuff intact  Right Adnexa: not tender, not full and no mass present  Left Adnexa: not tender, not full and no mass present  Cervix is not absent  Uterus is not absent  No urethral tenderness or mass present  Breasts:     Breasts are soft  Right: No swelling, bleeding, inverted nipple, mass, nipple discharge, skin change or tenderness  Left: No swelling, bleeding, inverted nipple, mass, nipple discharge, skin change or tenderness  HENT:      Head: Normocephalic  Cardiovascular:      Rate and Rhythm: Normal rate and regular rhythm  Pulmonary:      Effort: Pulmonary effort is normal       Breath sounds: Normal breath sounds  Abdominal:      General: Bowel sounds are normal       Palpations: Abdomen is soft  Hernia: There is no hernia in the left inguinal area or right inguinal area  Musculoskeletal:      Cervical back: Normal range of motion  Right lower leg: No edema  Left lower leg: No edema  Lymphadenopathy:      Upper Body:      Right upper body: No supraclavicular or axillary adenopathy  Left upper body: No supraclavicular or axillary adenopathy  Lower Body: No right inguinal adenopathy  No left inguinal adenopathy  Neurological:      Mental Status: She is alert and oriented to person, place, and time     Skin:     General: " Skin is warm and dry  Psychiatric:         Mood and Affect: Mood normal            Assessment/Plan:   1  Normal well-woman GYN exam   2  Cervical cancer screening:  Absent s/p hysterectomy   3  STD screening:  Patient declines not sexually active  4  Breast cancer screening:  Normal breast exam   Order placed for bilateral screening mammogram   Reviewed breast self-awareness  5  Colon cancer screening:  Up to date  No  Order placed for consultation with Gastroenterology for consultation to discuss screening  6  Depression Screening: Patient's depression screening was assessed with a PHQ-2 score of 0  Their PHQ-9 score was 3  Clinically patient does not have depression  No treatment is required  7  BMI Counseling: Body mass index is 30 27 kg/m²  Discussed the patient's BMI with her  The BMI is above normal  Nutrition recommendations include reducing portion sizes  8  Return to office 1yr/prn  Reviewed with patient that [facility], but that they will not necessarily be reviewed by me immediately  Explained that I will review results at my earliest opportunity and contact patient appropriately

## 2023-04-26 NOTE — ASSESSMENT & PLAN NOTE
Lab Results   Component Value Date    CHOLESTEROL 215 (H) 02/13/2020     Lab Results   Component Value Date    HDL 84 02/13/2020    HDL 44 02/22/2014     Lab Results   Component Value Date    TRIG 74 02/13/2020    TRIG 80 02/22/2014     Lab Results   Component Value Date    NONHDLC 131 02/13/2020     Lab Results   Component Value Date    LDLCALC 116 (H) 02/13/2020     Continue atorvastatin 40 mg daily  Low fat diet

## 2023-04-26 NOTE — ASSESSMENT & PLAN NOTE
BP Readings from Last 3 Encounters:   04/26/23 120/84   04/06/23 109/80   03/09/23 124/82     Controlled  Continue amlodipine 10 mg daily  Limit sodium and salt intake  Avoid alcohol and caffeine

## 2023-04-26 NOTE — ASSESSMENT & PLAN NOTE
Chronic tardive dyskinesia, followed by psychiatry and neurology  Upcoming neurology follow up 7/10  Follow up with psychiatry yesterday  Continue benztropine and ingrezza

## 2023-04-26 NOTE — PATIENT INSTRUCTIONS
Thank you for your confidence in our team    We appreciate you and welcome your feedback  If you receive a survey from us, please take a few moments to let us know how we are doing     Sincerely,  [facility], DO

## 2023-04-26 NOTE — PROGRESS NOTES
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  No further follow up recommended at this time  3  Tardive dyskinesia  Assessment & Plan:  Chronic tardive dyskinesia, followed by psychiatry and neurology  Upcoming neurology follow up 7/10  Follow up with psychiatry yesterday  Continue benztropine and ingrezza  4  Bipolar affective disorder, current episode mixed, current episode severity unspecified [facility])  Assessment & Plan:  Patient attends [facility]  Most recent appointment was yesterday  Patient tries to manage medications on her own  Unclear whether or not she is compliant with her medications  She is not able to verify her medication list  She forgot to bring in her medications with her today  Her son in law will try to bring it another day  5  Other migraine without status migrainosus, not intractable  Assessment & Plan:  She used to be on a preventative medication, but it appears to be d/c  Patient is unsure when  Recommend discussing with her upcoming neurology appointment  Continue Fioricet as needed  Avoid over use  Recommend supportive treatment  Adequate rest and hydration  Avoid triggers and irritants  Orders:  -     Butalbital-APAP-Caffeine (Fioricet) -40 MG CAPS; Take 1 tablet by mouth daily as needed (headache)    6  Chronic pain disorder  Assessment & Plan:  Continue pregabalin 50 mg daily         7  Hypokalemia  Assessment & Plan:  Lab Results   Component Value Date    K 4 1 04/05/2023     Low upon admission and was repleted  8  Mixed hyperlipidemia  Assessment & Plan:  Lab Results   Component Value Date    CHOLESTEROL 215 (H) 02/13/2020     Lab Results   Component Value Date    HDL 84 02/13/2020    HDL 44 02/22/2014     Lab Results   Component Value Date    TRIG 74 02/13/2020    TRIG 80 02/22/2014     Lab Results   Component Value Date    [place] 131 02/13/2020     Lab Results   Component Value Date    LDLCALC 116 (H) 02/13/2020     Continue atorvastatin 40 mg daily  Low fat diet  9  Essential hypertension  Assessment & Plan:  BP Readings from Last 3 Encounters:   04/26/23 120/84   04/06/23 109/80   03/09/23 124/82     Controlled  Continue amlodipine 10 mg daily  Limit sodium and salt intake  Avoid alcohol and caffeine  10  Other constipation  Assessment & Plan:  Chronic constipation  Add more fiber to diet  Increase hydration  Restart Colace twice daily as needed  Orders:  -     docusate sodium (COLACE) 100 mg capsule; Take 1 capsule (100 mg total) by mouth 2 (two) times a day    11  Fall, subsequent encounter  Assessment & Plan:  Patient is a fall risk  She will soon start PT/OT  George Uriarte also ordered for her  12  Ambulatory dysfunction  Assessment & Plan:  Her son in law states they will call today to set up her PT/OT sessions  She was unable to schedule the services in the home due to family scheduling issues  She also needs a new walker  A rollator was ordered through [employer] today  Subjective     Transitional Care Management Review:   Samantha D Tharon Callander is a 59 y o  female here for TCM follow up       During the TCM phone call patient stated:  TCM Call     Date and time call was made  7/8/2021  2:33 PM    Hospital care reviewed  Records reviewed    Patient was hospitialized at  [address] [place] [address]    Date of Admission  03/23/23    Date of discharge  04/06/23    Diagnosis  Small bowel obstruction (K56 609) Disposition  --  [facility]     Were the patients medications reviewed and updated  No    Current Symptoms  --  BACK PAIN      TCM Call     Clinical progress swelling  --  comes and goes    Post hospital issues  Reduced activity; Poor medication adherence; Poor ADL (Activities of Daily Living) performance  PT  HAS A WHEELCHAIR, WALKER, SHOWER CHAIR AND CRUTCHES    Should patient be enrolled in anticoag monitoring? No    Scheduled for follow up? Yes  7/7/21 WITH DR WALKER    Not clinically warranted  NOT SCHEDULING APPTS  DUE TO COVID19, WILL SET UP WITH TELECALL    Patients specialists  Other (comment)    Other specialists names  [facility]    Did you obtain your prescribed medications  Yes  USES [facility]  [address]     Do you need help managing your prescriptions or medications  No  [facility] [address]    Is transportation to your appointment needed  No   [address], USES [facility] AND HAS [address] [facility],[address] [facility]    I have advised the patient to call PCP with any new or worsening symptoms  Antonia Kelly MA    Living Arrangements  Children    Are you recieving any outpatient services  Yes    What type of services  [facility]    Are you recieving home care services  Yes    Types of home care services  Nurse visit  [address] [facility]    Are you using any community resources  No    Have you fallen in the last 12 months  No    Interperter language line needed  No    Counseling  Family  SPOKE TO SON ANTHONY    Counseling topics  instructions for management; Importance of RX compliance    Comments  Patient is currently staying at an apartment in hospitals that is transitional housing  See note from Lennox Tinajero RN Care manager          Shana Black is a 59year old female with a past medical history of mood disorder, hypertension, MIMI, migraine, CVA, tardive dyskinesia, and chronic pain presenting for a "transition of care  She presented to the ED 3/23 following multiple episodes of vomiting at home and unable to move bowels  She was found to have small bowel obstruction  She was seen by surgery  Her symptoms resolved and she was tolerating a normal diet  She was planned to discharge to rehab but then on day of pickup she fell and had seizure-like activity  She was seen by neurology  EEG was ordered which was negative  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 and Keppra was discontinued  She was cleared by PT/OT and sent home  Her main concern today is her migraine's  She states she has been getting several a week  She does not have any of her migraine medication at home  Patient is a poor historian  Review of Systems   Constitutional: Negative for appetite change, chills, fatigue and fever  Respiratory: Negative for cough, shortness of breath and wheezing  Cardiovascular: Negative for chest pain, palpitations and leg swelling  Gastrointestinal: Positive for constipation (chronic)  Negative for abdominal distention, abdominal pain, blood in stool, diarrhea, nausea and vomiting  Musculoskeletal: Positive for arthralgias (chronic), back pain (chronic) and myalgias (chronic)  Skin: Negative for rash  Neurological: Positive for headaches  Negative for dizziness, weakness and light-headedness  Hematological: Negative for adenopathy  Objective     /84 (BP Location: Right arm, Patient Position: Sitting, Cuff Size: Standard)   Pulse 81   Temp (!) 97 3 °F (36 3 °C) (Temporal)   Ht 5' 1\" (1 549 m)   Wt 70 3 kg (155 lb)   SpO2 100%   BMI 29 29 kg/m²      Physical Exam  Vitals and nursing note reviewed  Constitutional:       General: She is awake  She is not in acute distress  Appearance: Normal appearance  She is well-developed, well-groomed and overweight  She is not ill-appearing  HENT:      Head: Normocephalic and atraumatic     Eyes:      Conjunctiva/sclera: Conjunctivae " normal    Cardiovascular:      Rate and Rhythm: Normal rate and regular rhythm  Pulses: Normal pulses  Heart sounds: Normal heart sounds  No murmur heard  Pulmonary:      Effort: Pulmonary effort is normal  No respiratory distress  Breath sounds: Normal breath sounds and air entry  No decreased air movement  No decreased breath sounds, wheezing, rhonchi or rales  Abdominal:      General: Abdomen is flat  Bowel sounds are normal  There is no distension  Palpations: Abdomen is soft  There is no mass  Tenderness: There is no abdominal tenderness  There is no right CVA tenderness, left CVA tenderness, guarding or rebound  Hernia: No hernia is present  Skin:     General: Skin is warm  Coloration: Skin is not jaundiced  Findings: No rash  Neurological:      General: No focal deficit present  Mental Status: She is alert and oriented to person, place, and time  Mental status is at baseline  Gait: Gait abnormal       Comments: Tongue rolling   Psychiatric:         Attention and Perception: Attention normal          Mood and Affect: Mood normal          Behavior: Behavior is cooperative         Medications have been reviewed by provider in current encounter    Hay Ramos PA-C

## 2023-04-28 ENCOUNTER — PATIENT OUTREACH (OUTPATIENT)
Dept: INTERNAL MEDICINE CLINIC | Facility: CLINIC | Age: 58
End: 2023-04-28

## 2023-04-28 NOTE — PROGRESS NOTES
Outpatient Care Management Note:    I called patient's son in [address] [place] [address] and no answer  I did make him aware that we received notification that patient received a rollator back on 3/28/22 and insurance will not pay for another one until after 5 years  I did suggest if they are unable to locate the rollator that was given or unable to purchase one out of pocket I did recommend follow up with the place below for a gently used/donated rollator  [facility]   [address] [facility]  Phone # [phone]    I did request that he keep me updated regarding if they decide to proceed with the bubble packing  I left my contact number [phone]

## 2023-04-30 NOTE — ASSESSMENT & PLAN NOTE
-Discussed options of [facility]-Intensive Lifestyle Intervention Program, Very Low Calorie Diet-VLCD, Conservative Program, Louis-En-Y Gastric Bypass and Vertical Sleeve Gastrectomy and the role of weight loss medications   -Initial weight loss goal of 5-10% weight loss for improved health  -Screening labs: check A1c  LP as ordered  CMP reviewed  Normal TSH, but slightly low T4 when checked in 11/2019  Can recheck, but she will need management with her PCP if abnormal    -Patient is interested in pursuing bariatric surgery   -Avoid phentermine due to questionable hx of stroke and hx of bipolar  Would also likely avoid Wellbutrin for this reason due to risk of lowering seizure threshold if she has residual effects of CVA (memory changes listed in chart, also left sided weakness but may be due to lumbar disc disease)  -Avoid Belviq as she is on duloxetine and sumatriptan   Avoid Contrave as she is on narcotics  -Reviewed that she will likely need clearance by psychiatry prior to surgery
-on multiple medications  -continue management with psychiatry
-sx may improve with weight loss  -on morphine
-taking propranolol and amlodipine
119.9

## 2023-05-01 ENCOUNTER — TELEPHONE (OUTPATIENT)
Dept: PSYCHIATRY | Facility: CLINIC | Age: 58
End: 2023-05-01

## 2023-05-01 NOTE — TELEPHONE ENCOUNTER
Ref pt to [facility] ([phone])  Pt is aware referral will be sent over and closed on our end

## 2023-05-11 ENCOUNTER — HOSPITAL ENCOUNTER (OUTPATIENT)
Dept: MAMMOGRAPHY | Facility: CLINIC | Age: 58
Discharge: HOME | End: 2023-05-11

## 2023-05-11 DIAGNOSIS — Z12.31 ENCOUNTER FOR SCREENING MAMMOGRAM FOR MALIGNANT NEOPLASM OF BREAST: ICD-10-CM

## 2023-05-25 ENCOUNTER — HOSPITAL ENCOUNTER (EMERGENCY)
Facility: HOSPITAL | Age: 58
Discharge: HOME | End: 2023-05-25
Attending: EMERGENCY MEDICINE

## 2023-05-25 ENCOUNTER — APPOINTMENT (EMERGENCY)
Dept: CT IMAGING | Facility: HOSPITAL | Age: 58
End: 2023-05-25

## 2023-05-25 VITALS
OXYGEN SATURATION: 100 % | DIASTOLIC BLOOD PRESSURE: 76 MMHG | RESPIRATION RATE: 18 BRPM | SYSTOLIC BLOOD PRESSURE: 127 MMHG | WEIGHT: 156.53 LBS | BODY MASS INDEX: 29.58 KG/M2 | TEMPERATURE: 98.2 F | HEART RATE: 68 BPM

## 2023-05-25 DIAGNOSIS — K45.8 INTERNAL HERNIA: ICD-10-CM

## 2023-05-25 DIAGNOSIS — R11.2 NAUSEA & VOMITING: ICD-10-CM

## 2023-05-25 DIAGNOSIS — R10.9 ABDOMINAL PAIN: Primary | ICD-10-CM

## 2023-05-25 LAB
ALBUMIN SERPL BCP-MCNC: 4 G/DL (ref 3.5–5)
ALP SERPL-CCNC: 72 U/L (ref 34–104)
ALT SERPL W P-5'-P-CCNC: 8 U/L (ref 7–52)
ANION GAP SERPL CALCULATED.3IONS-SCNC: 7 MMOL/L (ref 4–13)
AST SERPL W P-5'-P-CCNC: 12 U/L (ref 13–39)
ATRIAL RATE: 87 BPM
BASOPHILS # BLD MANUAL: 0.06 THOUSAND/UL (ref 0–0.1)
BASOPHILS NFR MAR MANUAL: 2 % (ref 0–1)
BILIRUB SERPL-MCNC: 2.27 MG/DL (ref 0.2–1)
BUN SERPL-MCNC: 12 MG/DL (ref 5–25)
CALCIUM SERPL-MCNC: 8.7 MG/DL (ref 8.4–10.2)
CARDIAC TROPONIN I PNL SERPL HS: <2 NG/L
CHLORIDE SERPL-SCNC: 108 MMOL/L (ref 96–108)
CO2 SERPL-SCNC: 26 MMOL/L (ref 21–32)
CREAT SERPL-MCNC: 0.73 MG/DL (ref 0.6–1.3)
EOSINOPHIL # BLD MANUAL: 0.09 THOUSAND/UL (ref 0–0.4)
EOSINOPHIL NFR BLD MANUAL: 3 % (ref 0–6)
ERYTHROCYTE [DISTWIDTH] IN BLOOD BY AUTOMATED COUNT: 13.4 % (ref 11.6–15.1)
GFR SERPL CREATININE-BSD FRML MDRD: 90 ML/MIN/1.73SQ M
GLUCOSE SERPL-MCNC: 89 MG/DL (ref 65–140)
HCT VFR BLD AUTO: 42.2 % (ref 34.8–46.1)
HGB BLD-MCNC: 14 G/DL (ref 11.5–15.4)
LACTATE SERPL-SCNC: 0.7 MMOL/L (ref 0.5–2)
LYMPHOCYTES # BLD AUTO: 1.23 THOUSAND/UL (ref 0.6–4.47)
LYMPHOCYTES # BLD AUTO: 43 % (ref 14–44)
MAGNESIUM SERPL-MCNC: 2.1 MG/DL (ref 1.9–2.7)
MCH RBC QN AUTO: 29.5 PG (ref 26.8–34.3)
MCHC RBC AUTO-ENTMCNC: 33.2 G/DL (ref 31.4–37.4)
MCV RBC AUTO: 89 FL (ref 82–98)
MONOCYTES # BLD AUTO: 0.6 THOUSAND/UL (ref 0–1.22)
MONOCYTES NFR BLD: 21 % (ref 4–12)
NEUTROPHILS # BLD MANUAL: 0.83 THOUSAND/UL (ref 1.85–7.62)
NEUTS SEG NFR BLD AUTO: 29 % (ref 43–75)
P AXIS: 66 DEGREES
PLATELET # BLD AUTO: 233 THOUSANDS/UL (ref 149–390)
PLATELET BLD QL SMEAR: ADEQUATE
PMV BLD AUTO: 9.3 FL (ref 8.9–12.7)
POTASSIUM SERPL-SCNC: 3 MMOL/L (ref 3.5–5.3)
PR INTERVAL: 120 MS
PROT SERPL-MCNC: 6.7 G/DL (ref 6.4–8.4)
QRS AXIS: 101 DEGREES
QRSD INTERVAL: 88 MS
QT INTERVAL: 396 MS
QTC INTERVAL: 476 MS
RBC # BLD AUTO: 4.75 MILLION/UL (ref 3.81–5.12)
RBC MORPH BLD: NORMAL
SODIUM SERPL-SCNC: 141 MMOL/L (ref 135–147)
T WAVE AXIS: 20 DEGREES
VARIANT LYMPHS # BLD AUTO: 2 %
VENTRICULAR RATE: 87 BPM
WBC # BLD AUTO: 2.86 THOUSAND/UL (ref 4.31–10.16)

## 2023-05-25 RX ORDER — DOCUSATE SODIUM 100 MG/1
100 CAPSULE, LIQUID FILLED ORAL EVERY 12 HOURS
Qty: 60 CAPSULE | Refills: 0 | Status: SHIPPED | OUTPATIENT
Start: 2023-05-25

## 2023-05-25 RX ORDER — PANTOPRAZOLE SODIUM 40 MG/10ML
40 INJECTION, POWDER, LYOPHILIZED, FOR SOLUTION INTRAVENOUS ONCE
Status: COMPLETED | OUTPATIENT
Start: 2023-05-25 | End: 2023-05-25

## 2023-05-25 RX ORDER — POTASSIUM CHLORIDE 20MEQ/15ML
40 LIQUID (ML) ORAL ONCE
Status: COMPLETED | OUTPATIENT
Start: 2023-05-25 | End: 2023-05-25

## 2023-05-25 RX ORDER — ONDANSETRON 2 MG/ML
4 INJECTION INTRAMUSCULAR; INTRAVENOUS ONCE
Status: COMPLETED | OUTPATIENT
Start: 2023-05-25 | End: 2023-05-25

## 2023-05-25 RX ORDER — POLYETHYLENE GLYCOL 3350 17 G/17G
17 POWDER, FOR SOLUTION ORAL DAILY
Qty: 100 EACH | Refills: 0 | Status: SHIPPED | OUTPATIENT
Start: 2023-05-25

## 2023-05-25 RX ADMIN — POTASSIUM CHLORIDE 40 MEQ: 1.5 SOLUTION ORAL at 12:51

## 2023-05-25 RX ADMIN — IOHEXOL 100 ML: 350 INJECTION, SOLUTION INTRAVENOUS at 09:17

## 2023-05-25 RX ADMIN — MORPHINE SULFATE 2 MG: 2 INJECTION, SOLUTION INTRAMUSCULAR; INTRAVENOUS at 12:51

## 2023-05-25 RX ADMIN — ONDANSETRON 4 MG: 2 INJECTION INTRAMUSCULAR; INTRAVENOUS at 08:39

## 2023-05-25 RX ADMIN — SODIUM CHLORIDE 1000 ML: 0.9 INJECTION, SOLUTION INTRAVENOUS at 08:38

## 2023-05-25 RX ADMIN — PANTOPRAZOLE SODIUM 40 MG: 40 INJECTION, POWDER, FOR SOLUTION INTRAVENOUS at 12:51

## 2023-05-25 NOTE — ED PROVIDER NOTES
History  Chief Complaint   Patient presents with   • Abdominal Pain   • Vomiting   • Dizziness   • Headache     Patients states N/V started yesterday afternoon  Abdominal pain and headache  • Weakness - Generalized     59 YOF with a history of CVA, seizure-like activity, chronic pain disorder, memory loss, dyskinesia, bipolar disorder, generative disc disease with spinal cord stimulator placement in July 2020 and bowel obstruction in 3/2023 presents today with abdominal pain, nausea, vomiting that started yesterday  Has not been able to eat or drink anything  States her last bowel movement was yesterday around 1pm  Admits to headache and generalized weakness  States she is also having left arm pain, unsure when the pain started  Pt denies any chest pain, SOB, diarrhea, LH, dizziness, syncope  Prior to Admission Medications   Prescriptions Last Dose Informant Patient Reported? Taking? Butalbital-APAP-Caffeine (Fioricet) -40 MG CAPS   No No   Sig: Take 1 tablet by mouth daily as needed (headache)   Ingrezza 40 MG CAPS   No No   Sig: Take 40 mg by mouth in the morning   LORazepam (ATIVAN) 0 5 mg tablet   Yes No   Multiple Vitamin (multivitamin) tablet   No No   Sig: Take 1 tablet by mouth daily   PARoxetine (PAXIL) 30 mg tablet   Yes No   Sig: Take 60 mg by mouth 2 (two) times a day   Takes two (60 mg) tabs by mouth nightly   Indications: Social Anxiety Disorder   QUEtiapine (SEROquel) 50 mg tablet   Yes No   Sig: TAKE 1 TAB (50 MG) BY MOUTH 3 TIMES A DAY  (9 AM, 4 PM, AND AT BEDTIME)   acetaminophen (Acetaminophen Extra Strength) 500 mg tablet   No No   Sig: Take 1 tablet (500 mg total) by mouth every 6 (six) hours as needed for mild pain   amLODIPine (NORVASC) 10 mg tablet   No No   Sig: TAKE 1 TABLET BY MOUTH EVERY DAY   aspirin (ECOTRIN LOW STRENGTH) 81 mg EC tablet   No No   Sig: Take 1 tablet (81 mg total) by mouth daily   atorvastatin (LIPITOR) 40 mg tablet   No No   Sig: TAKE 1 TABLET BY MOUTH EVERY DAY   benztropine (COGENTIN) 1 mg tablet   Yes No   Sig: Take 1 mg by mouth 2 (two) times a day   busPIRone (BUSPAR) 10 mg tablet   Yes No   Sig: Take 20 mg by mouth 3 (three) times a day    ferrous sulfate 324 (65 Fe) mg   No No   Sig: Take 1 tablet (324 mg total) by mouth daily   folic acid (FOLVITE) 1 mg tablet   No No   Sig: TAKE 1 TABLET BY MOUTH EVERY DAY   hydrOXYzine HCL (ATARAX) 50 mg tablet   Yes No   Sig: Take 50 mg by mouth 3 (three) times a day as needed for itching   lithium carbonate (LITHOBID) 300 mg CR tablet   No No   Sig: TAKE 1 TABLET BY MOUTH DAILY AT BEDTIME   mirtazapine (REMERON) 7 5 MG tablet   No No   Sig: TAKE 1 TABLET BY MOUTH EVERY EVENING    pantoprazole (PROTONIX) 20 mg tablet   Yes No   Sig: Take 20 mg by mouth daily   prazosin (MINIPRESS) 2 mg capsule   Yes No   Sig: Take 2 mg by mouth daily at bedtime  Indications: Frightening Dreams   pregabalin (LYRICA) 50 mg capsule   No No   Si capsule twice a day  May increase to 1 capsule 3 times per day if needed  Facility-Administered Medications: None       Past Medical History:   Diagnosis Date   • Anxiety    • Arthritis     left knee   • Arthritis of right knee 10/6/2020   • At risk for falls    • Bipolar 2 disorder (HCC)     FOLLOWS WITH PSYCHIATRIST   CONTINUE LAMOTRIGINE; RESOLVED: [id number]   • Depression    • Familial tremor     both hands   • Fibromyalgia     LAST ASSESSED: [id number]   • GERD (gastroesophageal reflux disease)    • Hearing aid worn     left ear   • [place] (hard of hearing)     left ear   • Hyperlipidemia    • Hypertension    • Left-sided weakness    • Lumbar disc disease with radiculopathy 2018   • Memory loss of unknown cause     long and short term   • Migraine    • Multiple closed fractures of metatarsal bone of right foot 2021   • Obesity    • Obesity, Class II, BMI 35-39 9    • Osteoarthritis of both hips 10/31/2016   • Osteoarthritis of knee 2013    Description: Continue Tylenol and Naproxen  Encourage exercise and weight loss  Patient refused physical therapy  I will refer the patient back to Orthopedics  • Overactive bladder    • Panic attack    • Patellofemoral disorder of both knees 2020   • Post traumatic stress disorder    • Primary localized osteoarthritis of both knees 2017   • Primary osteoarthritis of both knees 2020   • S/P insertion of spinal cord stimulator     no remote   • S/P total knee arthroplasty, right 3/10/2022   • Sacroiliitis ([facility] [address] ) 2017   • Seasonal allergies    • Seizure-like activity ([facility] [address] ) 6/3/2022   • Seizures ([facility] [address] )     possible seizure like activity   • Status post total knee replacement, left 2022   • Stroke [facility])     questionable stroke    • Tardive dyskinesia     PATIENT STATES   • 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    CONT ASA 81 MG DAILY; RESOLVED: [id number]   • Urinary incontinence    • Uses walker    • Wears dentures     partial lower / full upper- doesnt wear   • Wears glasses        Past Surgical History:   Procedure Laterality Date   • BACK SURGERY     •  SECTION     • COLONOSCOPY      RESOLVED: [id number]   • EAR SURGERY     • EGD     • HYSTERECTOMY     • MYRINGOTOMY W/ TUBES Left    • NECK SURGERY  2019   • IN ARTHRP KNE CONDYLE&PLATU MEDIAL&LAT COMPARTMENTS Right 3/9/2022    Procedure: ARTHROPLASTY KNEE TOTAL;  Surgeon: Meenakshi Londono DO;  Location: [facility];  Service: Orthopedics   • IN ARTHRP KNE CONDYLE&PLATU MEDIAL&LAT COMPARTMENTS Left 2022    Procedure: ARTHROPLASTY KNEE TOTAL;  Surgeon: Meenakshi Londono DO;  Location: [facility];  Service: Orthopedics   • IN CYSTOURETHROSCOPY N/A 2016    Procedure: CYSTOSCOPY, BOTOX INJECTION;  Surgeon: Sabrina Briseno MD;  Location: [facility];  Service: Gynecology   • IN INSJ/RPLCMT SPI NPGR DIR/INDUXIVE COUPLING Right 2/10/2021    Procedure: REPLACEMENT IMPLANTABLE PULSE GENERATOR DORSAL SPINAL COLUMN STIMULATOR, RIGHT;  Surgeon: Lakesha Laurent MD;  Location: [facility];  Service: Neurosurgery   • CT PRQ IMPLTJ NSTIM ELECTRODE ARRAY EPIDURAL Right 7/28/2020    Procedure: INSERTION THORACIC DORSAL COLUMN SPINAL CORD STIMULATOR PERCUTANEOUS W IMPLANTABLE PULSE GENERATOR, RIGHT;  Surgeon: Lakesha Laurent MD;  Location: [facility];  Service: Neurosurgery   • TONSILLECTOMY     • TUBAL LIGATION  1986   • UPPER GASTROINTESTINAL ENDOSCOPY  09/2020       Family History   Problem Relation Age of Onset   • Colon cancer Mother    • Alzheimer's disease Father    • Stroke Father    • Colon cancer Brother    • Bipolar disorder Brother    • Breast cancer Maternal Aunt    • Colon cancer Maternal Aunt    • Heart disease Other    • Diabetes Other    • Hypertension Other    • Seizures Son    • Depression Paternal Grandfather    • No Known Problems Sister    • No Known Problems Brother    • Thyroid disease Neg Hx      I have reviewed and agree with the history as documented  E-Cigarette/Vaping   • E-Cigarette Use Never User      E-Cigarette/Vaping Substances   • Nicotine No    • THC No    • CBD No    • Flavoring No    • Other No    • Unknown No      Social History     Tobacco Use   • Smoking status: Never   • Smokeless tobacco: Never   Vaping Use   • Vaping Use: Never used   Substance Use Topics   • Alcohol use: Not Currently     Comment: 2 x year; being a social drinker as per all scripts    • Drug use: No       Review of Systems   Constitutional: Negative for fever  Respiratory: Negative for shortness of breath  Cardiovascular: Negative for chest pain  Gastrointestinal: Positive for abdominal distention, abdominal pain, nausea and vomiting  Negative for diarrhea  Neurological: Positive for weakness and headaches  All other systems reviewed and are negative  Physical Exam  Physical Exam  Vitals and nursing note reviewed  Constitutional:       General: She is not in acute distress       Appearance: She is well-developed  She is not ill-appearing  HENT:      Head: Normocephalic and atraumatic  Eyes:      Conjunctiva/sclera: Conjunctivae normal    Cardiovascular:      Rate and Rhythm: Normal rate and regular rhythm  Heart sounds: No murmur heard  Pulmonary:      Effort: Pulmonary effort is normal  No respiratory distress  Breath sounds: Normal breath sounds  Abdominal:      General: There is distension  Palpations: Abdomen is soft  Tenderness: There is generalized abdominal tenderness  There is guarding  Musculoskeletal:         General: No swelling  Cervical back: Neck supple  Skin:     General: Skin is warm and dry  Capillary Refill: Capillary refill takes less than 2 seconds  Neurological:      Mental Status: She is alert and oriented to person, place, and time  Comments: Patient has symptoms consistent with tardive dyskinesia with lip smacking and tongue thrusting    No facial asymmetry   Psychiatric:         Mood and Affect: Mood normal          Vital Signs  ED Triage Vitals   Temperature Pulse Respirations Blood Pressure SpO2   05/25/23 0720 05/25/23 0720 05/25/23 0720 05/25/23 0720 05/25/23 0720   98 2 °F (36 8 °C) 83 20 169/100 100 %      Temp src Heart Rate Source Patient Position - Orthostatic VS BP Location FiO2 (%)   -- 05/25/23 1013 05/25/23 0720 05/25/23 0720 --    Monitor Lying Right arm       Pain Score       05/25/23 0720       9           Vitals:    05/25/23 0720 05/25/23 1013 05/25/23 1335   BP: 169/100 127/86 127/76   Pulse: 83 70 68   Patient Position - Orthostatic VS: Lying Lying Lying         Visual Acuity      ED Medications  Medications   sodium chloride 0 9 % bolus 1,000 mL (0 mL Intravenous Stopped 5/25/23 1252)   ondansetron (ZOFRAN) injection 4 mg (4 mg Intravenous Given 5/25/23 0839)   iohexol (OMNIPAQUE) 350 MG/ML injection (SINGLE-DOSE) 100 mL (100 mL Intravenous Given 5/25/23 0917)   pantoprazole (PROTONIX) injection 40 mg (40 mg Intravenous Given 5/25/23 1251)   potassium chloride oral solution 40 mEq (40 mEq Oral Given 5/25/23 1251)   morphine injection 2 mg (2 mg Intravenous Given 5/25/23 1251)       Diagnostic Studies  Results Reviewed     Procedure Component Value Units Date/Time    Lactic acid, plasma (w/reflex if result > 2 0) [[id number]]  (Normal) Collected: 05/25/23 1058    Lab Status: Final result Specimen: Blood from Arm, Right Updated: 05/25/23 1144     LACTIC ACID 0 7 mmol/L     Narrative:      Result may be elevated if tourniquet was used during collection      Manual Differential(PHLEBS Do Not Order) [[id number]]  (Abnormal) Collected: 05/25/23 0838    Lab Status: Final result Specimen: Blood from Arm, Right Updated: 05/25/23 1019     Segmented % 29 %      Lymphocytes % 43 %      Monocytes % 21 %      Eosinophils, % 3 %      Basophils % 2 %      Atypical Lymphocytes % 2 %      Absolute Neutrophils 0 83 Thousand/uL      Lymphocytes Absolute 1 23 Thousand/uL      Monocytes Absolute 0 60 Thousand/uL      Eosinophils Absolute 0 09 Thousand/uL      Basophils Absolute 0 06 Thousand/uL      Total Counted --     RBC Morphology Normal     Platelet Estimate Adequate    HS Troponin 0hr (reflex protocol) [[id number]]  (Normal) Collected: 05/25/23 0838    Lab Status: Final result Specimen: Blood from Arm, Right Updated: 05/25/23 0909     hs TnI 0hr <2 ng/L     Comprehensive metabolic panel [[id number]]  (Abnormal) Collected: 05/25/23 0838    Lab Status: Final result Specimen: Blood from Arm, Right Updated: 05/25/23 0902     Sodium 141 mmol/L      Potassium 3 0 mmol/L      Chloride 108 mmol/L      CO2 26 mmol/L      ANION GAP 7 mmol/L      BUN 12 mg/dL      Creatinine 0 73 mg/dL      Glucose 89 mg/dL      Calcium 8 7 mg/dL      AST 12 U/L      ALT 8 U/L      Alkaline Phosphatase 72 U/L      Total Protein 6 7 g/dL      Albumin 4 0 g/dL      Total Bilirubin 2 27 mg/dL      eGFR 90 ml/min/1 73sq m     Narrative:      National Kidney Disease Foundation guidelines for Chronic Kidney Disease (CKD):   •  Stage 1 with normal or high GFR (GFR > 90 mL/min/1 73 square meters)  •  Stage 2 Mild CKD (GFR = 60-89 mL/min/1 73 square meters)  •  Stage 3A Moderate CKD (GFR = 45-59 mL/min/1 73 square meters)  •  Stage 3B Moderate CKD (GFR = 30-44 mL/min/1 73 square meters)  •  Stage 4 Severe CKD (GFR = 15-29 mL/min/1 73 square meters)  •  Stage 5 End Stage CKD (GFR <15 mL/min/1 73 square meters)  Note: GFR calculation is accurate only with a steady state creatinine    Magnesium [[id number]]  (Normal) Collected: 05/25/23 0838    Lab Status: Final result Specimen: Blood from Arm, Right Updated: 05/25/23 0902     Magnesium 2 1 mg/dL     CBC and differential [[id number]]  (Abnormal) Collected: 05/25/23 0838    Lab Status: Final result Specimen: Blood from Arm, Right Updated: 05/25/23 0847     WBC 2 86 Thousand/uL      RBC 4 75 Million/uL      Hemoglobin 14 0 g/dL      Hematocrit 42 2 %      MCV 89 fL      MCH 29 5 pg      MCHC 33 2 g/dL      RDW 13 4 %      MPV 9 3 fL      Platelets 784 Thousands/uL                  CT abdomen pelvis w contrast   Final Result by Gerhardt Ferretti, MD (05/25 0928)      No evidence of acute abdominopelvic process  Nondilated small bowel loops in the right lateral abdomen as described above with chronic twisting of the mesentery suggestive of nonobstructing internal hernia              Workstation performed: [id number]                    Procedures  ECG 12 Lead Documentation Only    Date/Time: 5/25/2023 9:30 AM    Performed by: Penelope Guzman PA-C  Authorized by: Penelope Guzman PA-C    Indications / Diagnosis:  Left arm pain  ECG reviewed by me, the ED Provider: yes (and Dr  )    Patient location:  ED  Previous ECG:     Previous ECG:  Unavailable  Interpretation:     Interpretation: normal    Rate:     ECG rate assessment: normal    Rhythm:     Rhythm: sinus rhythm    Ectopy:     Ectopy: none    QRS:     QRS axis: Normal    QRS intervals:  Normal  Conduction:     Conduction: normal    ST segments:     ST segments:  Normal  T waves:     T waves: non-specific               ED Course  ED Course as of 05/25/23 1429   Thu May 25, 2023   0905 WBC(!): 2 86  Not meeting any other SIRS criteria   0906 Potassium(!): 3 0  Low  Likely due to vomiting  Will replete   0906 TOTAL BILIRUBIN(!): 2 27  High  Pending CT scan   0913 hs TnI 0hr: <2   0930 ECG 12 lead  Normal sinus rhythm  Rightward axis  Nonspecific T wave abnormality  Abnormal ECG  No previous ECGs available   0952 CT abdomen pelvis w contrast  No evidence of acute abdominopelvic process      Nondilated small bowel loops in the right lateral abdomen as described above with chronic twisting of the mesentery suggestive of nonobstructing internal hernia  1026 TT to surgery    1041 Surgery requesting lactic acid  Will add on   1236 Surgery PA saw pt- okay for discharge  Would recommend bowel regimen for at home  1242 Discussed all results with the pt  Will PO challenge  Pt still c/o pain  Will add on medication                               SBIRT 20yo+    Flowsheet Row Most Recent Value   Initial Alcohol Screen: US AUDIT-C     1  How often do you have a drink containing alcohol? 0 Filed at: 05/25/2023 0720   2  How many drinks containing alcohol do you have on a typical day you are drinking? 0 Filed at: 05/25/2023 0720   3b  FEMALE Any Age, or MALE 65+: How often do you have 4 or more drinks on one occassion? 0 Filed at: 05/25/2023 0720   Audit-C Score 0 Filed at: 05/25/2023 4021   CAMRON: How many times in the past year have you    Used an illegal drug or used a prescription medication for non-medical reasons?  Never Filed at: 05/25/2023 0720                    Medical Decision Making  59 YOF with a history of CVA, seizure-like activity, chronic pain disorder, memory loss, dyskinesia, bipolar disorder, generative disc disease with spinal cord stimulator placement in July 2020 and bowel obstruction in 3/2023 presents today with abdominal pain, nausea, vomiting that started yesterday  Last BM yesterday  Left arm pain for unknown amount of time  On physical exam pt is generally well appearing, non tachycardic and afebrile  Generalized abdominal tenderness and distention  Patient has symptoms consistent with tardive dyskinesia with lip smacking and tongue thrusting  No facial asymmetry  Plan to r/o intraabdominal pathology with CT scan and obtain basic labs to assess electrolytes, infection, liver function and kidney function  Trop and ECG due to left arm pain   ECG non ischemic, Trop was <2  Left arm pain unlikely to be cardiac in nature  CBC showed low wbc however pt not meeting any other SIRS criteria  CMP showed hypokalemia  This was repleted orally  Bilirubin slightly elevated  CT scan showed no acute intra-abdominal pathology, findings suggestive of nonobstructive internal hernia  General surgery did see pt and this time did not recommend anything further other than bowel regimen for at home  Pt was given anti-nausea meds and pain medication and was able to tolerate PO here with no episodes of vomiting  Was discharged home with bowel regimen  Should follow up with PCP in 2-3 days  I have discussed the plan to discharge pt from ED  The patient was discharged in stable condition   Patient ambulated off the department   Extensive return to emergency department precautions were discussed   Follow up with appropriate providers including primary care physician was discussed   Patient and/or their  primary decision maker expressed understanding  Cally Holman remained stable during entire emergency department stay  Abdominal pain: acute illness or injury  Internal hernia: undiagnosed new problem with uncertain prognosis  Nausea & vomiting: acute illness or injury  Amount and/or Complexity of Data Reviewed  Labs: ordered   Decision-making details documented in ED Course  Radiology: ordered  Decision-making details documented in ED Course  ECG/medicine tests:  Decision-making details documented in ED Course  Risk  OTC drugs  Prescription drug management  Disposition  Final diagnoses:   Abdominal pain   Nausea & vomiting   Internal hernia     Time reflects when diagnosis was documented in both MDM as applicable and the Disposition within this note     Time User Action Codes Description Comment    5/25/2023 12:36 PM Marnell Fiddler Add [R10 9] Abdominal pain     5/25/2023 12:36 PM Marnell Fiddler Add [R11 2] Nausea & vomiting     5/25/2023 12:43 PM Marnell Fiddler Add [K45 8] Internal hernia       ED Disposition     ED Disposition   Discharge    Condition   Stable    Date/Time   Thu May 25, 2023  1:55 PM    Comment   Samantha Fernandez discharge to home/self care                 Follow-up Information    None         Discharge Medication List as of 5/25/2023  1:56 PM      START taking these medications    Details   docusate sodium (COLACE) 100 mg capsule Take 1 capsule (100 mg total) by mouth every 12 (twelve) hours, Starting u 5/25/2023, Normal      polyethylene glycol (MIRALAX) 17 g packet Take 17 g by mouth daily, Starting Thu 5/25/2023, Normal         CONTINUE these medications which have NOT CHANGED    Details   acetaminophen (Acetaminophen Extra Strength) 500 mg tablet Take 1 tablet (500 mg total) by mouth every 6 (six) hours as needed for mild pain, Starting Fri 2/17/2023, Normal      amLODIPine (NORVASC) 10 mg tablet TAKE 1 TABLET BY MOUTH EVERY DAY, Normal      aspirin (ECOTRIN LOW STRENGTH) 81 mg EC tablet Take 1 tablet (81 mg total) by mouth daily, Starting Wed 10/12/2022, Normal      atorvastatin (LIPITOR) 40 mg tablet TAKE 1 TABLET BY MOUTH EVERY DAY, Normal      benztropine (COGENTIN) 1 mg tablet Take 1 mg by mouth 2 (two) times a day, Starting Tue 4/12/2022, Historical Med      busPIRone (BUSPAR) 10 mg tablet Take 20 mg by mouth 3 (three) times a day , Starting Mon 11/8/2021, Historical Med      Butalbital-APAP-Caffeine (Fioricet) -40 MG CAPS Take 1 tablet by mouth daily as needed (headache), Starting Wed 4/26/2023, Normal      ferrous sulfate 324 (65 Fe) mg Take 1 tablet (324 mg total) by mouth daily, Starting Thu 4/1/9388, Normal      folic acid (FOLVITE) 1 mg tablet TAKE 1 TABLET BY MOUTH EVERY DAY, Normal      hydrOXYzine HCL (ATARAX) 50 mg tablet Take 50 mg by mouth 3 (three) times a day as needed for itching, Historical Med      Ingrezza 40 MG CAPS Take 40 mg by mouth in the morning, Starting Tue 4/4/2023, No Print      lithium carbonate (LITHOBID) 300 mg CR tablet TAKE 1 TABLET BY MOUTH DAILY AT BEDTIME, Normal      LORazepam (ATIVAN) 0 5 mg tablet Starting Tue 4/25/2023, Historical Med      mirtazapine (REMERON) 7 5 MG tablet TAKE 1 TABLET BY MOUTH EVERY EVENING , Normal      Multiple Vitamin (multivitamin) tablet Take 1 tablet by mouth daily, Starting Thu 6/2/2022, Normal      pantoprazole (PROTONIX) 20 mg tablet Take 20 mg by mouth daily, Historical Med      PARoxetine (PAXIL) 30 mg tablet Take 60 mg by mouth 2 (two) times a day  Takes two (60 mg) tabs by mouth nightly   Indications: Social Anxiety Disorder, Historical Med      prazosin (MINIPRESS) 2 mg capsule Take 2 mg by mouth daily at bedtime  Indications: Frightening Dreams, Historical Med      pregabalin (LYRICA) 50 mg capsule 1 capsule twice a day  May increase to 1 capsule 3 times per day if needed , Normal      QUEtiapine (SEROquel) 50 mg tablet TAKE 1 TAB (50 MG) BY MOUTH 3 TIMES A DAY  (9 AM, 4 PM, AND AT BEDTIME), Historical Med             No discharge procedures on file      PDMP Review       Value Time User    PDMP Reviewed  Yes 4/26/2023  2:57 PM Enrigue Schwab, PA-C          ED Provider  Electronically Signed by           Ana Laura Thomas PA-C  05/25/23 4364

## 2023-05-25 NOTE — DISCHARGE INSTRUCTIONS
Bowel Regimen  MiraLAX: 3 times a day for 3 days, then once daily  Colace: 1 pill twice a day  Citrucel: As directed on the bottle  You should drink at least 1 5 L of water per day

## 2023-06-08 ENCOUNTER — EVALUATION (OUTPATIENT)
Dept: PHYSICAL THERAPY | Facility: CLINIC | Age: 58
End: 2023-06-08
Payer: MEDICARE

## 2023-06-08 DIAGNOSIS — Z96.653 TOTAL KNEE REPLACEMENT STATUS, BILATERAL: Primary | ICD-10-CM

## 2023-06-08 PROCEDURE — 97163 PT EVAL HIGH COMPLEX 45 MIN: CPT

## 2023-06-08 NOTE — PROGRESS NOTES
PT Evaluation     Today's date: 2023  Patient name: Fay Garcia  : 1965  MRN: [id number]  Referring provider: No ref  provider found  Dx:   Encounter Diagnosis     ICD-10-CM    1  Total knee replacement status, bilateral  Z96 653           Start Time: 1005  Stop Time: 1100  Total time in clinic (min): 55 minutes    Assessment  Assessment details: Pt is a 59y o  year old female presenting to physical therapy for s/p bilateral TKA (R TKA 3/9/22, L TKA 22)  She presents with the following impairments: significantly decreased b/l knee and hip strength, poor quad activation b/l, decreased b/l knee ROM, decreased score on TUG, decreased score on 5xSTS, and decreased weight bearing tolerance affecting her function with walking, navigating stairs, balance, standing up from chairs, and standing for prolonged periods of time  Pt will benefit from skilled physical therapy to address functional limitations noted in evaluation and meet patient goals  Impairments: abnormal muscle firing, abnormal or restricted ROM, activity intolerance, impaired physical strength, lacks appropriate home exercise program, pain with function and poor body mechanics    Symptom irritability: moderateBarriers to therapy: S/p TKA (R TKA 3/9/22, L TKA 22)  Understanding of Dx/Px/POC: good   Prognosis: good    Goals  ST  Pt will decrease pain at rest to 4/10   2  Pt will be independent with HEP  3  Pt will improve b/l quad activation to good to improve gait ability  4  Pt will improve b/l knee extension ROM to 2 degrees or less  LT  Pt will decrease pain to 2/10 or better by time of discharge  2  Pt will improve b/l knee extension and flexion strength to 4+/5   3  Pt will improve b/l knee flexion and extension to WNL to improve gait and stair climbing ability  4  Pt will improve b/l hip flexion and abduction strength to 4+/5   5  Pt will improve ability to perform SLS b/l to 10 seconds      Plan  Patient would benefit from: PT eval and skilled physical therapy  Planned modality interventions: biofeedback, manual electrical stimulation, microcurrent electrical stimulation, TENS, electrical stimulation/Russian stimulation, thermotherapy: hydrocollator packs, cryotherapy and unattended electrical stimulation  Planned therapy interventions: abdominal trunk stabilization, joint mobilization, manual therapy, massage, ADL retraining, neuromuscular re-education, body mechanics training, patient education, postural training, strengthening, stretching, therapeutic activities, therapeutic exercise, flexibility, functional ROM exercises, home exercise program, balance and balance/weight bearing training  Frequency: 2x week  Duration in weeks: 6  Treatment plan discussed with: patient        Subjective Evaluation    History of Present Illness  Mechanism of injury: Pt presents to the clinic s/p b/l TKA, with the R ocurring on 3/9/22 and the L occurring on 22  Pt reported that she did not attend physical therapy for either knee  Pt stated that she has problems walking long distances, standing up straight, and balancing  Pt stated that she fell 6 times since she had the knee surgeries, with one of the falls taking place in the hospital where she hit her head  Pt reported that she did not have any numbness and tingling into the lower extremities  Pt stated that her legs feel very weak and has a little bit of pain around the knees  Pt stated that she also has a lot of pain in her back which gives her more discomfort than her knees sometimes  Pt stated that laying down helps with the pain in her knees and her back but sitting too long starts to hurt her back  Pt stated that she is not taking medication or using heat or ice for the pain  Pt stated that she has difficulty putting shoes on as well as cleaning rooms in her house    Quality of life: fair    Pain  Current pain ratin  At best pain ratin  At worst pain rating: "10  Quality: tight, discomfort and dull ache    Patient Goals  Patient goals for therapy: decreased pain, improved balance, increased motion, increased strength and independence with ADLs/IADLs          Objective     Palpation   Left   Tenderness of the distal biceps femoris, distal semimembranosus and distal semitendinosus  Right   Tenderness of the distal biceps femoris, distal semimembranosus and distal semitendinosus  Additional Palpation Details  Very tight b/l hamstrings muscles  Active Range of Motion   Left Knee   Flexion: 110 degrees   Extension: 6 degrees     Right Knee   Flexion: 125 degrees   Extension: 4 degrees     Strength/Myotome Testing     Left Hip   Planes of Motion   Flexion: 4-  Abduction: 3    Right Hip   Planes of Motion   Flexion: 4-  Abduction: 3    Left Knee   Flexion: 4  Extension: 4-  Quadriceps contraction: poor    Right Knee   Flexion: 4  Extension: 3+  Quadriceps contraction: poor    Tests     Additional Tests Details  5xSTS: 29 seconds  TU seconds  Could not perform SLS on either side               Precautions: s/p TKA (R TKA 3/9/22, L TKA 22)    Date             Visit # IE            FOTO IE             Re-eval IE              Manuals             B/l knee PROM                                                    Neuro Re-Ed             Quad sets 3x5\" ea            clamshells 10x OTB            LAQ 10x ea            SAQ             TKE             bridges             Weight shifts             Ther Ex             bike             Seated march 5x ea            Hamstring curl stand             Standing hip abd/ext             Hamstring stretch 2x20\" ea sit            Sitting heel slide                                       Ther Activity             Sit to stands             Step ups             Gait Training                                       Modalities                                          "

## 2023-06-14 ENCOUNTER — OFFICE VISIT (OUTPATIENT)
Dept: PHYSICAL THERAPY | Facility: CLINIC | Age: 58
End: 2023-06-14
Payer: MEDICARE

## 2023-06-14 ENCOUNTER — TELEPHONE (OUTPATIENT)
Dept: OBGYN CLINIC | Facility: HOSPITAL | Age: 58
End: 2023-06-14

## 2023-06-14 DIAGNOSIS — Z96.653 STATUS POST BILATERAL KNEE REPLACEMENTS: Primary | ICD-10-CM

## 2023-06-14 DIAGNOSIS — Z96.653 TOTAL KNEE REPLACEMENT STATUS, BILATERAL: Primary | ICD-10-CM

## 2023-06-14 PROCEDURE — 97112 NEUROMUSCULAR REEDUCATION: CPT

## 2023-06-14 PROCEDURE — 97110 THERAPEUTIC EXERCISES: CPT

## 2023-06-14 NOTE — PROGRESS NOTES
"Daily Note     Today's date: 2023  Patient name: Hubert Barrientos  : 1965  MRN: [id number]  Referring provider: No ref  provider found  Dx:   Encounter Diagnosis     ICD-10-CM    1  Total knee replacement status, bilateral  Z96 653           Start Time: 730  Stop Time: 830  Total time in clinic (min): 60 minutes    Subjective: Pt stated that she tried a couple of the exercises on her HEP but did not perform them that much since last visit  Objective: See treatment diary below      Assessment: Pt tolerated warm up on bike well at beginning of session without increase in pain during or after activity and reported slight decrease in stiffness  Pt was challenged by performance of sit to stands from standard height clinic chair (47cm), continue to perform to improve transfer ability  Pt was also challenged by SLR activity and showed decreased performance on L side compared to R side  Pt had difficulty with trial of ambulation with cane, advised pt to continue to use rolling walker when ambulating in the community and in the home to improve stability, walking ability, and safety, pt understood instruction  Pt required verbal cueing throughout session to improve standing posture as well as to complete standing hip and knee strengthening exercises  Pt would benefit from continued skilled PT to improve b/l knee strength, hip strength, knee ROM, mobility, flexibility, gait, balance, and functional ability  Plan: Continue per plan of care  Progress treatment as tolerated         Precautions: s/p TKA (R TKA 3/9/22, L TKA 22)    Date            Visit # IE 2           FOTO IE             Re-eval IE              Manuals            B/l knee PROM                                                    Neuro Re-Ed            Quad sets 3x5\" ea seated on chair 10x3\" ea           clamshells 10x OTB 20x PTB           LAQ 10x ea 15x3\" ea           SAQ  15x3\" ea           TKE           " "  bridges  nv           Weight shifts  15x s/s            Ther Ex 6/8 6/14           bike  6' Seated march 5x ea 20x           Hamstring curl stand  nv           Standing hip abd/ext  10x ea           Hamstring stretch 2x20\" ea sit 3x20\" ea sit           Supine heel slide  10x10\" ea           SLR  10x ea                        Ther Activity 6/8 6/14           Sit to stands  2x10           Step ups  10x ea 0R           Gait Training 6/8 6/14           Walking with walker  3 laps           Walking with cane  2x10'           Modalities 6/8                                           "

## 2023-06-14 NOTE — TELEPHONE ENCOUNTER
Caller: [facility]    Doctor: Alice Dancer    Reason for call: Asked for a PT updated PT order           Fax- [phone]

## 2023-06-15 ENCOUNTER — PATIENT OUTREACH (OUTPATIENT)
Dept: INTERNAL MEDICINE CLINIC | Facility: CLINIC | Age: 58
End: 2023-06-15

## 2023-06-15 NOTE — PROGRESS NOTES
"Outpatient Care Management Note:    Received message from patient's son in [facility] requesting a call back regarding medication and \"pill case\" and PT referral  Per chart review Ortho did place order for PT yesterday  Patient had PT evaluation on 6/8/23 and scheduled tomorrow 6/16 with PT  He also wanted to discuss medication  I called him back at [phone] and left message requesting a call back and left my contact number  Patient needs PCP follow up appointment  Last seen for AWV in February and TCM 4/26/23   Patient to follow up in August    "

## 2023-06-16 ENCOUNTER — OFFICE VISIT (OUTPATIENT)
Dept: PHYSICAL THERAPY | Facility: CLINIC | Age: 58
End: 2023-06-16
Payer: MEDICARE

## 2023-06-16 DIAGNOSIS — Z96.653 TOTAL KNEE REPLACEMENT STATUS, BILATERAL: Primary | ICD-10-CM

## 2023-06-16 DIAGNOSIS — Z96.653 STATUS POST BILATERAL KNEE REPLACEMENTS: ICD-10-CM

## 2023-06-16 PROCEDURE — 97140 MANUAL THERAPY 1/> REGIONS: CPT

## 2023-06-16 PROCEDURE — 97110 THERAPEUTIC EXERCISES: CPT

## 2023-06-16 PROCEDURE — 97112 NEUROMUSCULAR REEDUCATION: CPT

## 2023-06-16 PROCEDURE — 97530 THERAPEUTIC ACTIVITIES: CPT

## 2023-06-16 NOTE — PROGRESS NOTES
"Daily Note     Today's date: 2023  Patient name: Stephanie Tang  : 1965  MRN: [id number]  Referring provider: Carlos Healy  Dx:   Encounter Diagnosis     ICD-10-CM    1  Total knee replacement status, bilateral  Z96 653       2  Status post bilateral knee replacements  Z96 653 Ambulatory Referral to Physical Therapy          Start Time: 1135  Stop Time: 1230  Total time in clinic (min): 55 minutes    Subjective: Pt reports to therapy reporting some knee stiffness  She said she just needs to stretch out  Objective: See treatment diary below      Assessment: Tolerated treatment fair  Patient required VC during LAQ and SAQ to engage quad muscle for increased time under tension and control the eccentric portion of the motion  Pt displays decreased active terminal knee extension throughout exercises  Bridges were added to exercises this session with significant difficulty noted as seen with decreased height and control  Knee extension ROM was more limited compared to knee flexion during PROM, though ROM visibly improved following stretching  Sit to stands were significantly fatiguing for pt with increased rest breaks required  CGA and tactile cuing was provided for posture  Pt was educated on performing exercise at home to improve quad and posterior chain strength  Continue to progress pt as tolerated next visit  Plan: Continue per plan of care        Precautions: s/p TKA (R TKA 3/9/22, L TKA 22)    Date           Visit # IE 2 3          FOTO IE             Re-eval IE              Manuals           B/l knee PROM   NS flexion extension                                                 Neuro Re-Ed           Quad sets 3x5\" ea seated on chair 10x3\" ea supine 10x 10\" hold ea side          clamshells 10x OTB 20x PTB 2x10 ptb          LAQ 10x ea 15x3\" ea 15x ea 5\" hold          SAQ  15x3\" ea 2x10 ea side          TKE             bridges  nv 2x10     " "     Weight shifts  15x s/s            Ther Ex 6/8 6/14 6/16          bike  6' 6'          Seated march 5x ea 20x           Hamstring curl stand  nv Sitting ptb          Standing hip abd/ext  10x ea           Hamstring stretch 2x20\" ea sit 3x20\" ea sit           Supine heel slide  10x10\" ea 15x 5\" hold ea side          SLR  10x ea 10x ea side                       Ther Activity 6/8 6/14 6/16          Sit to stands  2x10 2x10          Pt edu   NS          Step ups  10x ea 0R           Gait Training 6/8 6/14 6/16          Walking with walker  3 laps           Walking with cane  2x10'           Modalities 6/8 6/16                                         "

## 2023-06-21 ENCOUNTER — OFFICE VISIT (OUTPATIENT)
Dept: PHYSICAL THERAPY | Facility: CLINIC | Age: 58
End: 2023-06-21
Payer: MEDICARE

## 2023-06-21 DIAGNOSIS — Z96.653 STATUS POST BILATERAL KNEE REPLACEMENTS: ICD-10-CM

## 2023-06-21 DIAGNOSIS — Z96.653 TOTAL KNEE REPLACEMENT STATUS, BILATERAL: Primary | ICD-10-CM

## 2023-06-21 PROCEDURE — 97112 NEUROMUSCULAR REEDUCATION: CPT

## 2023-06-21 PROCEDURE — 97110 THERAPEUTIC EXERCISES: CPT

## 2023-06-21 NOTE — PROGRESS NOTES
"Daily Note     Today's date: 2023  Patient name: Zoe Mejía  : 1965  MRN: [id number]  Referring provider: No ref  provider found  Dx:   Encounter Diagnosis     ICD-10-CM    1  Total knee replacement status, bilateral  Z96 653       2  Status post bilateral knee replacements  Z96 653           Start Time: 07  Stop Time: 0810  Total time in clinic (min): 40 minutes    Subjective: Pt stated that she has been doing more around the house without pain medication but stated that the muscles in her arms and back have been feeling tight and a little sore  Objective: See treatment diary below      Assessment: Pt had mild difficulty with warm up on bike with default resistance on the bike when started and needed to modify activity by restarting without resistance  Pt continues to demonstrate poor posture with gait using rolling walker and required mild to moderate verbal cueing to correct posture  Pt attempted to perform supine bridges today but had a hard time with glute activation and when she was able to perform had moderate discomfort and reported that she had a muscle cramp in the RLE, add glute setting and hip extension exercises next session  Pt would benefit from continued skilled PT to improve BLE strength, gait, mobility, ROM, balance, and functional ability  Plan: Continue per plan of care  Progress treatment as tolerated         Precautions: s/p TKA (R TKA 3/9/22, L TKA 22)    Date          Visit # IE 2 3 4         FOTO IE             Re-eval IE              Manuals          B/l knee PROM   NS flexion extension nv                                                Neuro Re-Ed          Quad sets 3x5\" ea seated on chair 10x3\" ea supine 10x 10\" hold ea side 20x3\" sup ea         clamshells 10x OTB 20x PTB 2x10 ptb          LAQ 10x ea 15x3\" ea 15x ea 5\" hold 2x10 5\" ea         SAQ  15x3\" ea 2x10 ea side 2x10 ea         TKE           " "  bridges  nv 2x10 2x**         Weight shifts  15x s/s            SLS             Ther Ex 6/8 6/14 6/16 6/21         bike  6' 6' 6'         Seated march 5x ea 20x  20x ea         Hamstring curl stand  nv Sitting ptb 2x10  PTB         Standing hip abd/ext  10x ea  nv         Hamstring stretch 2x20\" ea sit 3x20\" ea sit           Supine heel slide  10x10\" ea 15x 5\" hold ea side 20x5\" ea         SLR  10x ea 10x ea side 2x10 ea                      Ther Activity 6/8 6/14 6/16 6/21         Sit to stands  2x10 2x10 2x10         Pt edu   NS DK         Step ups  10x ea 0R           Gait Training 6/8 6/14 6/16 6/21         Walking with walker  3 laps  2 laps         Walking with cane  2x10'           Obstacle course with walker                          Modalities 6/8 6/16                                         "

## 2023-06-23 ENCOUNTER — HOSPITAL ENCOUNTER (EMERGENCY)
Facility: HOSPITAL | Age: 58
Discharge: HOME | End: 2023-06-23
Attending: EMERGENCY MEDICINE
Payer: MEDICARE

## 2023-06-23 ENCOUNTER — OFFICE VISIT (OUTPATIENT)
Dept: PHYSICAL THERAPY | Facility: CLINIC | Age: 58
End: 2023-06-23
Payer: MEDICARE

## 2023-06-23 VITALS
TEMPERATURE: 98.3 F | SYSTOLIC BLOOD PRESSURE: 146 MMHG | DIASTOLIC BLOOD PRESSURE: 95 MMHG | HEART RATE: 92 BPM | OXYGEN SATURATION: 100 % | RESPIRATION RATE: 17 BRPM

## 2023-06-23 DIAGNOSIS — G24.01 TARDIVE DYSKINESIA: ICD-10-CM

## 2023-06-23 DIAGNOSIS — F41.0 PANIC ATTACK: Primary | ICD-10-CM

## 2023-06-23 DIAGNOSIS — G89.4 CHRONIC PAIN DISORDER: ICD-10-CM

## 2023-06-23 DIAGNOSIS — Z96.653 STATUS POST BILATERAL KNEE REPLACEMENTS: ICD-10-CM

## 2023-06-23 DIAGNOSIS — Z96.653 TOTAL KNEE REPLACEMENT STATUS, BILATERAL: Primary | ICD-10-CM

## 2023-06-23 LAB
ATRIAL RATE: 93 BPM
P AXIS: 81 DEGREES
PR INTERVAL: 132 MS
QRS AXIS: 92 DEGREES
QRSD INTERVAL: 90 MS
QT INTERVAL: 428 MS
QTC INTERVAL: 532 MS
T WAVE AXIS: 6 DEGREES
VENTRICULAR RATE: 93 BPM

## 2023-06-23 PROCEDURE — 99283 EMERGENCY DEPT VISIT LOW MDM: CPT

## 2023-06-23 PROCEDURE — 96374 THER/PROPH/DIAG INJ IV PUSH: CPT

## 2023-06-23 PROCEDURE — 97140 MANUAL THERAPY 1/> REGIONS: CPT

## 2023-06-23 PROCEDURE — 93005 ELECTROCARDIOGRAM TRACING: CPT

## 2023-06-23 PROCEDURE — 96376 TX/PRO/DX INJ SAME DRUG ADON: CPT

## 2023-06-23 PROCEDURE — 93010 ELECTROCARDIOGRAM REPORT: CPT | Performed by: INTERNAL MEDICINE

## 2023-06-23 PROCEDURE — 97112 NEUROMUSCULAR REEDUCATION: CPT

## 2023-06-23 PROCEDURE — 97110 THERAPEUTIC EXERCISES: CPT

## 2023-06-23 RX ORDER — LORAZEPAM 0.5 MG/1
0.5 TABLET ORAL EVERY 8 HOURS PRN
Qty: 5 TABLET | Refills: 0 | Status: SHIPPED | OUTPATIENT
Start: 2023-06-23 | End: 2023-06-28

## 2023-06-23 RX ORDER — VALBENAZINE 40 MG/1
40 CAPSULE ORAL DAILY
Qty: 30 CAPSULE | Refills: 0 | Status: SHIPPED | OUTPATIENT
Start: 2023-06-23

## 2023-06-23 RX ORDER — LORAZEPAM 2 MG/ML
1 INJECTION INTRAMUSCULAR ONCE
Status: COMPLETED | OUTPATIENT
Start: 2023-06-23 | End: 2023-06-23

## 2023-06-23 RX ORDER — LIDOCAINE 40 MG/G
CREAM TOPICAL AS NEEDED
Qty: 30 G | Refills: 0 | Status: SHIPPED | OUTPATIENT
Start: 2023-06-23

## 2023-06-23 RX ORDER — ACETAMINOPHEN 325 MG/1
325 TABLET ORAL ONCE
Status: COMPLETED | OUTPATIENT
Start: 2023-06-23 | End: 2023-06-23

## 2023-06-23 RX ORDER — SENNOSIDES 8.6 MG
650 CAPSULE ORAL EVERY 8 HOURS PRN
Qty: 30 TABLET | Refills: 0 | Status: SHIPPED | OUTPATIENT
Start: 2023-06-23

## 2023-06-23 RX ORDER — ACETAMINOPHEN 325 MG/1
650 TABLET ORAL ONCE
Status: COMPLETED | OUTPATIENT
Start: 2023-06-23 | End: 2023-06-23

## 2023-06-23 RX ADMIN — ACETAMINOPHEN 325 MG: 325 TABLET ORAL at 05:59

## 2023-06-23 RX ADMIN — LORAZEPAM 1 MG: 2 INJECTION INTRAMUSCULAR; INTRAVENOUS at 04:43

## 2023-06-23 RX ADMIN — LORAZEPAM 1 MG: 2 INJECTION INTRAMUSCULAR; INTRAVENOUS at 06:00

## 2023-06-23 RX ADMIN — ACETAMINOPHEN 650 MG: 325 TABLET ORAL at 04:42

## 2023-06-23 NOTE — ED NOTES
Spoke to son in law   Reports he will be on his way in 45 minutes     [facility]  06/23/23 0583

## 2023-06-23 NOTE — ED PROVIDER NOTES
History  Chief Complaint   Patient presents with   • Anxiety     Pt arrives via EMS for anxiety  Has been taking  Ativan for the last few days and family became concerned about pt so arrives for evaluation  Pt tearful on arrival and hyperventilating  The patient is a 61-year-old female with history of memory loss, CVA, bilateral knee replacement, bipolar 2 disorder, panic attack, who presents to the ED for evaluation of anxiety  The patient is tearful on exam, and states that she feels upset as she lives with her daughter and son-in-law who are her primary caretakers  She reports they have 5 children, and that they should not have to take care of her as well  She reports that her Ativan, as well as a couple of her other medications, are  and she has not picked up new prescriptions  Tonight, she took  Ativan  She then became concerned that because her medications have been , they were harmful to take, prompting her concern and ED visit  She does report numbness around her mouth and to her bilateral hands  Other than bilateral knee pain which is at its baseline, she denies any other complaints including CP, SOB, abdominal pain  She denies suicidal ideation, homicidal ideation, hallucinations  Prior to Admission Medications   Prescriptions Last Dose Informant Patient Reported? Taking? Butalbital-APAP-Caffeine (Fioricet) -40 MG CAPS   No No   Sig: Take 1 tablet by mouth daily as needed (headache)   Ingrezza 40 MG CAPS   No No   Sig: Take 40 mg by mouth in the morning   LORazepam (ATIVAN) 0 5 mg tablet   Yes No   Multiple Vitamin (multivitamin) tablet   No No   Sig: Take 1 tablet by mouth daily   PARoxetine (PAXIL) 30 mg tablet   Yes No   Sig: Take 60 mg by mouth 2 (two) times a day   Takes two (60 mg) tabs by mouth nightly   Indications: Social Anxiety Disorder   QUEtiapine (SEROquel) 50 mg tablet   Yes No   Sig: TAKE 1 TAB (50 MG) BY MOUTH 3 TIMES A DAY  (9 AM, 4 PM, AND AT BEDTIME)   acetaminophen (Acetaminophen Extra Strength) 500 mg tablet   No No   Sig: Take 1 tablet (500 mg total) by mouth every 6 (six) hours as needed for mild pain   amLODIPine (NORVASC) 10 mg tablet   No No   Sig: TAKE 1 TABLET BY MOUTH EVERY DAY   aspirin (ECOTRIN LOW STRENGTH) 81 mg EC tablet   No No   Sig: Take 1 tablet (81 mg total) by mouth daily   atorvastatin (LIPITOR) 40 mg tablet   No No   Sig: TAKE 1 TABLET BY MOUTH EVERY DAY   benztropine (COGENTIN) 1 mg tablet   Yes No   Sig: Take 1 mg by mouth 2 (two) times a day   busPIRone (BUSPAR) 10 mg tablet   Yes No   Sig: Take 20 mg by mouth 3 (three) times a day    docusate sodium (COLACE) 100 mg capsule   No No   Sig: Take 1 capsule (100 mg total) by mouth every 12 (twelve) hours   ferrous sulfate 324 (65 Fe) mg   No No   Sig: Take 1 tablet (324 mg total) by mouth daily   folic acid (FOLVITE) 1 mg tablet   No No   Sig: TAKE 1 TABLET BY MOUTH EVERY DAY   hydrOXYzine HCL (ATARAX) 50 mg tablet   Yes No   Sig: Take 50 mg by mouth 3 (three) times a day as needed for itching   lithium carbonate (LITHOBID) 300 mg CR tablet   No No   Sig: TAKE 1 TABLET BY MOUTH DAILY AT BEDTIME   mirtazapine (REMERON) 7 5 MG tablet   No No   Sig: TAKE 1 TABLET BY MOUTH EVERY EVENING    pantoprazole (PROTONIX) 20 mg tablet   Yes No   Sig: Take 20 mg by mouth daily   polyethylene glycol (MIRALAX) 17 g packet   No No   Sig: Take 17 g by mouth daily   prazosin (MINIPRESS) 2 mg capsule   Yes No   Sig: Take 2 mg by mouth daily at bedtime  Indications: Frightening Dreams   pregabalin (LYRICA) 50 mg capsule   No No   Si capsule twice a day  May increase to 1 capsule 3 times per day if needed  Facility-Administered Medications: None       Past Medical History:   Diagnosis Date   • Anxiety    • Arthritis     left knee   • Arthritis of right knee 10/6/2020   • At risk for falls    • Bipolar 2 disorder (HCC)     FOLLOWS WITH PSYCHIATRIST   CONTINUE LAMOTRIGINE; RESOLVED: [id number]   • Depression    • Familial tremor     both hands   • Fibromyalgia     LAST ASSESSED: [id number]   • GERD (gastroesophageal reflux disease)    • Hearing aid worn     left ear   • [place] (hard of hearing)     left ear   • Hyperlipidemia    • Hypertension    • Left-sided weakness    • Lumbar disc disease with radiculopathy 2/2/2018   • Memory loss of unknown cause     long and short term   • Migraine    • Multiple closed fractures of metatarsal bone of right foot 5/2/2021   • Obesity    • Obesity, Class II, BMI 35-39 9    • Osteoarthritis of both hips 10/31/2016   • Osteoarthritis of knee 2/20/2013    Description: Continue Tylenol and Naproxen  Encourage exercise and weight loss  Patient refused physical therapy  I will refer the patient back to Orthopedics  • Overactive bladder    • Panic attack    • Patellofemoral disorder of both knees 5/1/2020   • Post traumatic stress disorder    • Primary localized osteoarthritis of both knees 6/16/2017   • Primary osteoarthritis of both knees 5/1/2020   • S/P insertion of spinal cord stimulator     no remote   • S/P total knee arthroplasty, right 3/10/2022   • Sacroiliitis ([address] ) 2/28/2017   • Seasonal allergies    • Seizure-like activity ([address] ) 6/3/2022   • Seizures ([address] )     possible seizure like activity   • Status post total knee replacement, left 7/5/2022   • Stroke [facility])     questionable stroke 2009   • Tardive dyskinesia     PATIENT STATES   • 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    CONT ASA 81 MG DAILY; RESOLVED: [id number]   • Urinary incontinence    • Uses walker    • Wears dentures     partial lower / full upper- doesnt wear   • Wears glasses        Past Surgical History:   Procedure Laterality Date   • BACK SURGERY     • [address]   • COLONOSCOPY      RESOLVED: [id number]   • EAR SURGERY     • EGD     • HYSTERECTOMY  2004   • MYRINGOTOMY W/ TUBES Left    • NECK SURGERY  04/2019   • IA ARTHRP KNE CONDYLE&PLATU MEDIAL&LAT COMPARTMENTS Right 3/9/2022    Procedure: ARTHROPLASTY KNEE TOTAL;  Surgeon: Ruthy Foster DO;  [facility];  Service: Orthopedics   • CA ARTHRP KNE CONDYLE&PLATU MEDIAL&LAT COMPARTMENTS Left 7/5/2022    Procedure: ARTHROPLASTY KNEE TOTAL;  Surgeon: Ruthy Foster DO;  [facility];  Service: Orthopedics   • CA CYSTOURETHROSCOPY N/A 2/18/2016    Procedure: CYSTOSCOPY, BOTOX INJECTION;  Surgeon: iLnnette Trammell MD;  Location: [facility];  Service: Gynecology   • CA INSJ/RPLCMT SPI NPGR DIR/INDUXIVE COUPLING Right 2/10/2021    Procedure: REPLACEMENT IMPLANTABLE PULSE GENERATOR DORSAL SPINAL COLUMN STIMULATOR, RIGHT;  Surgeon: Priyanka Abbasi MD;  [facility];  Service: Neurosurgery   • CA PRQ IMPLTJ NSTIM ELECTRODE ARRAY EPIDURAL Right 7/28/2020    Procedure: INSERTION THORACIC DORSAL COLUMN SPINAL CORD STIMULATOR PERCUTANEOUS W IMPLANTABLE PULSE GENERATOR, RIGHT;  Surgeon: Priyanka Abbasi MD;  Location[facility];  Service: Neurosurgery   • TONSILLECTOMY     • TUBAL LIGATION  1986   • UPPER GASTROINTESTINAL ENDOSCOPY  09/2020       Family History   Problem Relation Age of Onset   • Colon cancer Mother    • Alzheimer's disease Father    • Stroke Father    • Colon cancer Brother    • Bipolar disorder Brother    • Breast cancer Maternal Aunt    • Colon cancer Maternal Aunt    • Heart disease Other    • Diabetes Other    • Hypertension Other    • Seizures Son    • Depression Paternal Grandfather    • No Known Problems Sister    • No Known Problems Brother    • Thyroid disease Neg Hx      I have reviewed and agree with the history as documented      E-Cigarette/Vaping   • E-Cigarette Use Never User      E-Cigarette/Vaping Substances   • Nicotine No    • THC No    • CBD No    • Flavoring No    • Other No    • Unknown No      Social History     Tobacco Use   • Smoking status: Never   • Smokeless tobacco: Never   Vaping Use   • Vaping Use: Never used   Substance Use Topics   • Alcohol use: Not Currently     Comment: 2 x year; being a social drinker as per all scripts    • Drug use: No       Review of Systems   Constitutional: Negative for chills and fever  Respiratory: Negative for shortness of breath  Cardiovascular: Negative for chest pain  Gastrointestinal: Negative for abdominal pain  Musculoskeletal: Positive for arthralgias  Negative for myalgias  Skin: Negative for rash and wound  Neurological: Negative for syncope  Psychiatric/Behavioral: The patient is nervous/anxious  Physical Exam  Physical Exam  Vitals and nursing note reviewed  Constitutional:       General: She is not in acute distress  Appearance: She is well-developed  She is not ill-appearing  Comments: Tardive dyskinesia    HENT:      Head: Normocephalic and atraumatic  Eyes:      Conjunctiva/sclera: Conjunctivae normal    Cardiovascular:      Rate and Rhythm: Normal rate and regular rhythm  Heart sounds: No murmur heard  Comments: Distal pulses 2+ in all extremities  Pulmonary:      Effort: Pulmonary effort is normal  No respiratory distress  Breath sounds: Normal breath sounds  Abdominal:      Palpations: Abdomen is soft  Tenderness: There is no abdominal tenderness  Musculoskeletal:         General: No swelling  Cervical back: Neck supple  Skin:     General: Skin is warm and dry  Capillary Refill: Capillary refill takes less than 2 seconds  Neurological:      Mental Status: She is alert and oriented to person, place, and time  Cranial Nerves: No facial asymmetry  Sensory: Sensation is intact  No sensory deficit  Motor: Motor function is intact  Psychiatric:         Mood and Affect: Mood normal  Affect is tearful  Behavior: Behavior is not agitated or aggressive  Behavior is cooperative  Thought Content: Thought content is not paranoid  Thought content does not include homicidal or suicidal ideation           Vital Signs  ED Triage Vitals   Temperature Pulse Respirations Blood Pressure SpO2   06/23/23 0346 06/23/23 0346 06/23/23 0346 06/23/23 0346 06/23/23 0346   99 5 °F (37 5 °C) 100 (!) 28 (!) 162/122 100 %      Temp Source Heart Rate Source Patient Position - Orthostatic VS BP Location FiO2 (%)   06/23/23 0346 06/23/23 0530 06/23/23 0530 06/23/23 0530 --   Oral Monitor Lying Right arm       Pain Score       06/23/23 0346       5           Vitals:    06/23/23 0346 06/23/23 0530   BP: (!) 162/122 (!) 146/101   Pulse: 100 95   Patient Position - Orthostatic VS:  Lying         Visual Acuity      ED Medications  Medications   acetaminophen (TYLENOL) tablet 325 mg (has no administration in time range)   LORazepam (ATIVAN) injection 1 mg (has no administration in time range)   LORazepam (ATIVAN) injection 1 mg (1 mg Intravenous Given 6/23/23 0443)   acetaminophen (TYLENOL) tablet 650 mg (650 mg Oral Given 6/23/23 0442)       Diagnostic Studies  Results Reviewed     None                 No orders to display              Procedures  Procedures         ED Course  ED Course as of 06/23/23 0557   Fri Jun 23, 2023   0451 EKG: NSR at 93 BPM, RAD, motion artifact, nonspecific T wave abnormality, no significant change from previous EKG, no acute ischemic changes as interpreted by me         SBIRT 20yo+    Flowsheet Row Most Recent Value   Initial Alcohol Screen: US AUDIT-C     1  How often do you have a drink containing alcohol? 0 Filed at: 06/23/2023 0406   2  How many drinks containing alcohol do you have on a typical day you are drinking? 0 Filed at: 06/23/2023 0406   3b  FEMALE Any Age, or MALE 65+: How often do you have 4 or more drinks on one occassion? 0 Filed at: 06/23/2023 0406   Audit-C Score 0 Filed at: 06/23/2023 7621   CAMRON: How many times in the past year have you    Used an illegal drug or used a prescription medication for non-medical reasons?  Never Filed at: 06/23/2023 0406          Medical Decision Making  Patient is a 28-year-old female presenting to the ED for evaluation of anxiety  She is requesting placement at an assisted living facility  Patient has a  already which she works with outpatient  Call placed to patient's son-in-law, and case discussed  He reports the patient confirms the patient does have a case management team   He and his wife to take care of the patient  He reports that the patient recently ran out of Ativan and Husarah Russell  She does have an appointment with neurology on  to have these medications refilled  She does have  Ativan at home  I discussed with the patient reaching out to her case management team for placement if she is interested, but due to the fact that she has a home and caretakers she does not require immediate placement today  She verbalized understanding and agreement, states she will reach out today  PDMP reviewed, patient was last prescribed 60 tablets of 0 5 mg of Ativan (30 day supply) on 23 by Dr Chaka Daniel  discussed with patient that I will prescribe very short course until she is able to follow up to help control her anxiety  She is agreeable  I am unable to prescribe Ingrezza  She does report feeling significant improvement in anxiety  She is no longer tearful, and her vitals are stable  Will dc  She also requests medications for her knee pain; will rx Lidocaine cream, Tylenol     At the time of discharge, the patient is in no acute distress  I discussed with the patient the diagnosis, treatment plan, follow-up, return precautions, and discharge instructions; they were given the opportunity to ask questions and verbalized understanding  Chronic pain disorder: chronic illness or injury  Panic attack: acute illness or injury  Amount and/or Complexity of Data Reviewed  Independent Historian: caregiver and EMS  External Data Reviewed: labs and notes  ECG/medicine tests: ordered and independent interpretation performed   Decision-making details documented in ED Course  Risk  OTC drugs  Prescription drug management  Disposition  Final diagnoses:   Panic attack   Chronic pain disorder     Time reflects when diagnosis was documented in both MDM as applicable and the Disposition within this note     Time User Action Codes Description Comment    6/23/2023  5:51 AM Angel Poon Add [F41 0] Panic attack     6/23/2023  5:53 AM Angel Cleve Add [G89 4] Chronic pain disorder       ED Disposition     ED Disposition   Discharge    Condition   Stable    Date/Time   Fri Jun 23, 2023  5:51 AM    Comment   Samantha Rodriguez discharge to home/self care  Follow-up Information    None         Patient's Medications   Discharge Prescriptions    ACETAMINOPHEN (TYLENOL) 650 MG CR TABLET    Take 1 tablet (650 mg total) by mouth every 8 (eight) hours as needed for mild pain       Start Date: 6/23/2023 End Date: --       Order Dose: 650 mg       Quantity: 30 tablet    Refills: 0    LIDOCAINE (LMX) 4 % CREAM    Apply topically as needed for mild pain       Start Date: 6/23/2023 End Date: --       Order Dose: --       Quantity: 30 g    Refills: 0    LORAZEPAM (ATIVAN) 0 5 MG TABLET    Take 1 tablet (0 5 mg total) by mouth every 8 (eight) hours as needed for anxiety for up to 5 days       Start Date: 6/23/2023 End Date: 6/28/2023       Order Dose: 0 5 mg       Quantity: 5 tablet    Refills: 0       No discharge procedures on file      PDMP Review       Value Time User    PDMP Reviewed  Yes 4/26/2023  2:57 PM Leo Curry PA-C          ED Provider  Electronically Signed by           Konstantin Lua PA-C  06/23/23 4612

## 2023-06-23 NOTE — ED NOTES
Pt provided with ginger ale at this time  Provider aware       Aidee Nino, [address] [place] [address]  06/23/23 3118

## 2023-06-23 NOTE — PROGRESS NOTES
"Daily Note     Today's date: 2023  Patient name: Odell Bautista  : 1965  MRN: [id number]  Referring provider: No ref  provider found  Dx:   Encounter Diagnosis     ICD-10-CM    1  Total knee replacement status, bilateral  Z96 653       2  Status post bilateral knee replacements  Z96 653                      Subjective: Pt presents to PT reporting she was at the ED this morning  She states she feels ok to do therapy today  Objective: See treatment diary below      Assessment: Reviewed pt's chart and noted she was in the ED for anxiety attack  Pt was instructed to let clinician know if she not feeling well  Pt reports a verbal understanding  Pt demonstrates appropriate challenge to TE performed today  Noted mild limitations with B knee extension  She continues to work toward PT goals  Pt offered no complaints throughout session  Pt was instructed on performing glute sets at home occ throughout the day  Pt reports a verbal understanding  Patient demonstrated fatigue post treatment, exhibited good technique with therapeutic exercises and would benefit from continued PT to increase flexibility, strength and function  Plan: Continue per plan of care        Precautions: s/p TKA (R TKA 3/9/22, L TKA 22)    Date         Visit # IE 2 3 4 5        FOTO IE             Re-eval IE              Manuals         B/l knee PROM   NS flexion extension nv PK                                               Neuro Re-Ed         Quad sets 3x5\" ea seated on chair 10x3\" ea supine 10x 10\" hold ea side 20x3\" sup ea         clamshells 10x OTB 20x PTB 2x10 ptb          LAQ 10x ea 15x3\" ea 15x ea 5\" hold 2x10 5\" ea 2x10 5\" ea 1#        SAQ  15x3\" ea 2x10 ea side 2x10 ea 2x10 5\" ea 1#        TKE             bridges  nv 2x10 2x**         Weight shifts  15x s/s            SLS             Ther Ex         bike  6' 6' 6' 6'      " "  Seated march 5x ea 20x  20x ea         Glut sets     5\" x 15        Hamstring curl stand  nv Sitting ptb 2x10  PTB         Standing hip abd/ext  10x ea  nv         Hamstring stretch 2x20\" ea sit 3x20\" ea sit           Supine heel slide  10x10\" ea 15x 5\" hold ea side 20x5\" ea 20x5\" ea        SLR  10x ea 10x ea side 2x10 ea                      Ther Activity 6/8 6/14 6/16 6/21 6/23        Sit to stands  2x10 2x10 2x10         Pt edu   NS DK         Step ups  10x ea 0R           Gait Training 6/8 6/14 6/16 6/21 6/23        Walking with walker  3 laps  2 laps         Walking with cane  2x10'           Obstacle course with walker                          Modalities 6/8 6/16 6/23                                       "

## 2023-06-23 NOTE — ED NOTES
"Pts son called at this time to pick patient up  Pt also requesting I note in chart that she would like to be placed at [facility] in [place]\"       [facility]  06/23/23 8916    "

## 2023-06-23 NOTE — DISCHARGE INSTRUCTIONS
Follow up with your  as discussed   Call today to inform them of your interest in an assisted living facility    Take Ativan as prescribed as needed for anxiety    Use Lidocaine cream on knees as needed for pain    Take Tylenol as prescribed as needed for pain    Return for chest pain, trouble breathing, leg swelling,  or any other new/concerning symptoms

## 2023-06-23 NOTE — ED NOTES
Picked up by son in law, wheeled to ED exit  Aware RX at [facility] for her       Yimi Toney, MARIA  06/23/23 7820

## 2023-06-23 NOTE — ED NOTES
Pt requesting to urinate, noris placed on pt at this time       Shelley Abraham [facility]  06/23/23 5870

## 2023-06-26 ENCOUNTER — TELEPHONE (OUTPATIENT)
Dept: NEUROLOGY | Facility: CLINIC | Age: 58
End: 2023-06-26

## 2023-06-26 NOTE — TELEPHONE ENCOUNTER
Called and left a voicemail for patient - Please call back to confirm upcoming appointment with PATIENTS [facility]  Provided patient with apt date, time and location  Informed patient that check in is at least 15 minutes prior to apt time

## 2023-06-28 ENCOUNTER — OFFICE VISIT (OUTPATIENT)
Dept: PHYSICAL THERAPY | Facility: CLINIC | Age: 58
End: 2023-06-28
Payer: MEDICARE

## 2023-06-28 DIAGNOSIS — Z96.653 STATUS POST BILATERAL KNEE REPLACEMENTS: ICD-10-CM

## 2023-06-28 DIAGNOSIS — Z96.653 TOTAL KNEE REPLACEMENT STATUS, BILATERAL: Primary | ICD-10-CM

## 2023-06-28 PROCEDURE — 97110 THERAPEUTIC EXERCISES: CPT

## 2023-06-28 PROCEDURE — 97112 NEUROMUSCULAR REEDUCATION: CPT

## 2023-06-28 PROCEDURE — 97116 GAIT TRAINING THERAPY: CPT

## 2023-06-28 NOTE — PROGRESS NOTES
"Daily Note     Today's date: 2023  Patient name: Milli Damico  : 1965  MRN: [id number]  Referring provider: No ref  provider found  Dx:   Encounter Diagnosis     ICD-10-CM    1  Total knee replacement status, bilateral  Z96 653       2  Status post bilateral knee replacements  Z96 653                      Subjective: Pt presents to PT reporting back soreness and stiffness  She states her knees \"feel good\"  Objective: See treatment diary below      Assessment: Tolerated treatment well  She is using walker outside the house  Educated pt on proper gait with SPC in L and R hand  Pt was advised to have cane in L hand secondary to noting cramping in L hand while using cane  Pt required cuing for correct gait with [facility] for step through pattern which she demonstrated well while in the clinic  Pt demonstrates difficulty with steps ups stepping up with R leg secondary to possible weakness  Patient demonstrated fatigue post treatment, exhibited good technique with therapeutic exercises and would benefit from continued PT to increase flexibility, strength and function  Plan: Continue per plan of care        Precautions: s/p TKA (R TKA 3/9/22, L TKA 22)    Date        Visit # IE 2 3 4 5 6       FOTO IE             Re-eval IE              Manuals        B/l knee PROM   NS flexion extension nv PK PK Ext                                              Neuro Re-Ed        Quad sets 3x5\" ea seated on chair 10x3\" ea supine 10x 10\" hold ea side 20x3\" sup ea  supine 10x 10\" hold ea side       clamshells 10x OTB 20x PTB 2x10 ptb   np       LAQ 10x ea 15x3\" ea 15x ea 5\" hold 2x10 5\" ea 2x10 5\" ea 1# 2x10 5\" ea 1#       SAQ  15x3\" ea 2x10 ea side 2x10 ea 2x10 5\" ea 1# np       TKE             bridges  nv 2x10 2x**         Weight shifts  15x s/s            SLS             Ther Ex        bike  6' 6' " "6' 6' 6'       Seated march 5x ea 20x  20x ea  np       Glut sets     5\" x 15 5\" x 15       Hamstring curl stand  nv Sitting ptb 2x10  PTB  15x  GTB       Standing hip abd/ext  10x ea  nv  1# 15x ea       Hamstring stretch 2x20\" ea sit 3x20\" ea sit           Supine heel slide  10x10\" ea 15x 5\" hold ea side 20x5\" ea 20x5\" ea        SLR  10x ea 10x ea side 2x10 ea  15x ea       Side steps      2 laps       Ther Activity 6/8 6/14 6/16 6/21 6/23 6/28       Sit to stands  2x10 2x10 2x10         Pt edu   NS DK  PK       Step ups  10x ea 0R    10x 1R       Gait Training 6/8 6/14 6/16 6/21 6/23 6/28       Walking with walker  3 laps  2 laps         Walking with cane  2x10'    3 x 15'       Obstacle course with walker                          Modalities 6/8 6/16 6/23 6/28                                      "

## 2023-06-30 ENCOUNTER — OFFICE VISIT (OUTPATIENT)
Dept: PHYSICAL THERAPY | Facility: CLINIC | Age: 58
End: 2023-06-30
Payer: MEDICARE

## 2023-06-30 DIAGNOSIS — Z96.653 STATUS POST BILATERAL KNEE REPLACEMENTS: ICD-10-CM

## 2023-06-30 DIAGNOSIS — Z96.653 TOTAL KNEE REPLACEMENT STATUS, BILATERAL: Primary | ICD-10-CM

## 2023-06-30 PROCEDURE — 97112 NEUROMUSCULAR REEDUCATION: CPT

## 2023-06-30 PROCEDURE — 97140 MANUAL THERAPY 1/> REGIONS: CPT

## 2023-06-30 PROCEDURE — 97110 THERAPEUTIC EXERCISES: CPT

## 2023-06-30 PROCEDURE — 97530 THERAPEUTIC ACTIVITIES: CPT

## 2023-06-30 NOTE — PROGRESS NOTES
"Daily Note     Today's date: 2023  Patient name: Marbella Flanagan  : 1965  MRN: [id number]  Referring provider: No ref  provider found  Dx:   Encounter Diagnosis     ICD-10-CM    1  Total knee replacement status, bilateral  Z96 653       2  Status post bilateral knee replacements  Z96 [id number]                      Subjective: Pt presents to PT reporting she has been working on better posture  She complains of weakness in mid back  Pt denies pain or discomfort in B knees today  Pt denies pain in low back or B knees post PT session  Objective: See treatment diary below      Assessment: Tolerated treatment well continuing to work towards mobility and strength goals  Added leg press to assist with B LE strength  Noted pliability remains in B knees and able to perform PROM to extension and noted improved extension while AMB c her walker however pt unable to maintain full extension while supine  Pt demonstrated improvement with AMB with SPC however she required cues to perform step through gait  Patient demonstrated fatigue post treatment, exhibited good technique with therapeutic exercises and would benefit from continued PT to increase flexibility, strength and function  Pt required occ rest periods throughout session  Plan: Continue per plan of care        Precautions: s/p TKA (R TKA 3/9/22, L TKA 22)    Date       Visit # IE 2 3 4 5 6 7      FOTO IE             Re-eval IE              Manuals       B/l knee PROM   NS flexion extension nv PK PK Ext PK ext      Hamstring stretch       PK                                Neuro Re-Ed       Quad sets 3x5\" ea seated on chair 10x3\" ea supine 10x 10\" hold ea side 20x3\" sup ea  supine 10x 10\" hold ea side       clamshells 10x OTB 20x PTB 2x10 ptb   np       LAQ 10x ea 15x3\" ea 15x ea 5\" hold 2x10 5\" ea 2x10 5\" ea 1# 2x10 5\" ea 1# 2x10 5\" ea 1#    " "  SAQ  15x3\" ea 2x10 ea side 2x10 ea 2x10 5\" ea 1# np       TKE             bridges  nv 2x10 2x**         Weight shifts  15x s/s            SLS             Ther Ex 6/8 6/14 6/16 6/21 6/23 6/28 6/30      bike  6' 6' 6' 6' 6' 6'      Leg press       65# 20x      Seated march 5x ea 20x  20x ea  np Stand1# 15x ea       Glut sets     5\" x 15 5\" x 15       Hamstring curl stand  nv Sitting ptb 2x10  PTB  15x  GTB       Standing hip abd/ext  10x ea  nv  1# 15x ea 1# 10x2 ea      Hamstring stretch 2x20\" ea sit 3x20\" ea sit           Supine heel slide  10x10\" ea 15x 5\" hold ea side 20x5\" ea 20x5\" ea        SLR  10x ea 10x ea side 2x10 ea  15x ea       Side steps      2 laps       Ther Activity 6/8 6/14 6/16 6/21 6/23 6/28 6/30      Sit to stands  2x10 2x10 2x10         Pt edu   NS DK  PK PK      Step ups  10x ea 0R    10x 1R 10x2 1R      Gait Training 6/8 6/14 6/16 6/21 6/23 6/28 6/30      Walking with walker  3 laps  2 laps         Walking with cane  2x10'    3 x 15' 3 x 15'      Obstacle course with walker                          Modalities 6/8 6/16 6/23 6/28 6/30                                     "

## 2023-07-03 ENCOUNTER — APPOINTMENT (OUTPATIENT)
Dept: PHYSICAL THERAPY | Facility: CLINIC | Age: 58
End: 2023-07-03
Payer: MEDICARE

## 2023-07-10 ENCOUNTER — OFFICE VISIT (OUTPATIENT)
Dept: NEUROLOGY | Facility: CLINIC | Age: 58
End: 2023-07-10
Payer: MEDICARE

## 2023-07-10 ENCOUNTER — APPOINTMENT (OUTPATIENT)
Dept: PHYSICAL THERAPY | Facility: CLINIC | Age: 58
End: 2023-07-10
Payer: MEDICARE

## 2023-07-10 VITALS
SYSTOLIC BLOOD PRESSURE: 146 MMHG | DIASTOLIC BLOOD PRESSURE: 94 MMHG | RESPIRATION RATE: 18 BRPM | TEMPERATURE: 97.8 F | OXYGEN SATURATION: 99 % | HEIGHT: 61 IN | BODY MASS INDEX: 28.58 KG/M2 | HEART RATE: 78 BPM | WEIGHT: 151.4 LBS

## 2023-07-10 DIAGNOSIS — G31.84 MILD COGNITIVE IMPAIRMENT: Primary | ICD-10-CM

## 2023-07-10 DIAGNOSIS — G89.4 CHRONIC PAIN DISORDER: ICD-10-CM

## 2023-07-10 DIAGNOSIS — Z86.73 HISTORY OF STROKE: ICD-10-CM

## 2023-07-10 DIAGNOSIS — G24.01 TARDIVE DYSKINESIA: ICD-10-CM

## 2023-07-10 PROCEDURE — 99214 OFFICE O/P EST MOD 30 MIN: CPT | Performed by: NURSE PRACTITIONER

## 2023-07-10 RX ORDER — PREGABALIN 50 MG/1
CAPSULE ORAL
Qty: 90 CAPSULE | Refills: 3 | Status: SHIPPED | OUTPATIENT
Start: 2023-07-10

## 2023-07-10 RX ORDER — VALBENAZINE 40 MG/1
40 CAPSULE ORAL DAILY
Qty: 30 CAPSULE | Refills: 5 | Status: SHIPPED | OUTPATIENT
Start: 2023-07-10

## 2023-07-10 NOTE — PROGRESS NOTES
Patient ID: Chino Nick is a 59 y.o. female. Assessment/Plan:  Patient Instructions:  Restart lyrica for pain, continue ingrezza at low dose-this was sent to the pharmacy lately; let us know if you do not get the medication. Continue aspirin/lipitor for stroke prevention  Memory testing today is improved; continue with plans to see neuropsychology  Make sure to follow up with psychiatry to discuss medications to prevent panic attacks and to address sleep; I think once you are back on some of your medications your headaches will improve. Continue with trying to stay active at home/prevent falls. Follow up in 4 months. We discussed the importance of sooner follow up with psychiatry to discuss anxiety/medications. It is my impression that her headaches will improve once she is stable with a medication regimen. With regard to her previous seizure like activity in the hospital setting (once in 2022 and once more recently) it would be important not to introduce high dose ssri therapy unless sure she is taking it regularly outpatient. Diagnoses and all orders for this visit:    Mild cognitive impairment    Chronic pain disorder  -     pregabalin (LYRICA) 50 mg capsule; 1 capsule twice a day. May increase to 1 capsule 3 times per day if needed. Tardive dyskinesia  -     Ingrezza 40 MG CAPS; Take 40 mg by mouth in the morning    History of stroke   History of Migraine  Panic attacks        Subjective:    ADI  Freddy Cheatham presents for 4 month follow up of memory loss, history of stroke, migraine, and multifactorial gait dysfunction. She is alone at today's visit; she states usually family accompanies her to the visit but her daughter recently had a stroke so she asked them to stay home and she took an Erenest Bolk here. Since last visit she has had one hospitalization for hernia/sbo/seizure like activity that was deemed nonepileptic-she denies seizure like activity since hospitalization.  Last visit she was suggested to see ENT and neuropsychology; she has not seen these providers (appears she may have appt with [facility] neuropsychiatry but she is unsure when). She states lately she has been able to be more independent and has been more active at home (she would like to eventually live on her own again). She does still feel her memory is a problem but repeat testing today shows improved MOCA. She complains mainly today of increase in headaches and increase in panic attacks (recent ed visit late June) since April. She states this is when she last saw psychiatrist ([facility]- saw Dr. Randee Faulkner and will see Dr. Elli Dunlap in the future) and medication changes were made at that time-she states now off buspar, paxil(was previously on high dose and states stopped this abruptly), cogentin,lithium, atarax . She takes her seroquel, prn ativan, ingrezza (awaiting refill at this time of low dose 40mg-I do not see much difference in facial movements off the medication but she reports improvement with the medication), and remeron. She states she ran out of lyrica but upon review of pdmp it appears she picked up first fill after last appt but not refills. It is unclear how frequently she is consistent with her medications. She is regular with her asa/lipitor and denies any recent stroke like symptoms. She continues PT for her knee and may consider PT for her back in the future as she continues with pain and "hunched over" posture. She states she is eating/drinking but less and more healthy and has purposefully lost weight. She states sleep is not good-she reports much better success in the past with trazodone. She uses PRN tylenol. No current headache, but she states headaches are bitemporal/bifrontal and occipital and associated with nausea, occasional vomiting, and brain fog.      Lab Results   Component Value Date    WBC 2.86 (L) 05/25/2023    HGB 14.0 05/25/2023    HCT 42.2 05/25/2023    MCV 89 05/25/2023     05/25/2023     Lab Results Component Value Date    SODIUM 141 05/25/2023    K 3.0 (L) 05/25/2023     05/25/2023    CO2 26 05/25/2023    BUN 12 05/25/2023    CREATININE 0.73 05/25/2023    GLUC 89 05/25/2023    CALCIUM 8.7 05/25/2023     Ct head 4/1/2023:  PARENCHYMA:  No intracranial mass, mass effect or midline shift. No CT signs of acute infarction. No acute parenchymal hemorrhage. VENTRICLES AND EXTRA-AXIAL SPACES:  Normal for the patient's age. VISUALIZED ORBITS: Normal visualized orbits. PARANASAL SINUSES: Normal visualized paranasal sinuses. CALVARIUM AND EXTRACRANIAL SOFT TISSUES:  Normal.  IMPRESSION:  No acute intracranial abnormality. EKG 5/25/2023:  Normal sinus rhythm, rate 87, qtc 476  Rightward axis  Nonspecific T wave abnormality  Abnormal ECG    EEG 48 hour 3/23/2023:  Interpretation: This is an abnormal day 2 of 2 days (24 hours without video) continuous ambulatory EEG due to excessive diffuse theta activity in the waking background. This finding is etiologically nonspecific for mild diffuse cerebral dysfunction, which may in part be due to psychotropic medications. There are no epileptiform discharges over the course of more than 47 hours ambulatory EEG.     Current Outpatient Medications on File Prior to Visit   Medication Sig Dispense Refill   • acetaminophen (TYLENOL) 650 mg CR tablet Take 1 tablet (650 mg total) by mouth every 8 (eight) hours as needed for mild pain 30 tablet 0   • amLODIPine (NORVASC) 10 mg tablet TAKE 1 TABLET BY MOUTH EVERY DAY 90 tablet 3   • aspirin (ECOTRIN LOW STRENGTH) 81 mg EC tablet Take 1 tablet (81 mg total) by mouth daily 90 tablet 3   • atorvastatin (LIPITOR) 40 mg tablet TAKE 1 TABLET BY MOUTH EVERY DAY 90 tablet 3   • docusate sodium (COLACE) 100 mg capsule Take 1 capsule (100 mg total) by mouth every 12 (twelve) hours 60 capsule 0   • ferrous sulfate 324 (65 Fe) mg Take 1 tablet (324 mg total) by mouth daily 30 tablet 1   • folic acid (FOLVITE) 1 mg tablet TAKE 1 TABLET BY MOUTH EVERY DAY 90 tablet 1   • LORazepam (ATIVAN) 0.5 mg tablet      • LORazepam (Ativan) 0.5 mg tablet Take 1 tablet (0.5 mg total) by mouth every 8 (eight) hours as needed for anxiety for up to 5 days (Patient taking differently: Take 0.5 mg by mouth 2 (two) times a day) 5 tablet 0   • mirtazapine (REMERON) 7.5 MG tablet TAKE 1 TABLET BY MOUTH EVERY EVENING. 90 tablet 1   • QUEtiapine (SEROquel) 50 mg tablet TAKE 1 TAB (50 MG) BY MOUTH 3 TIMES A DAY. (9 AM, 4 PM, AND AT BEDTIME)     • [DISCONTINUED] Ingrezza 40 MG CAPS Take 40 mg by mouth in the morning 30 capsule 0   • acetaminophen (Acetaminophen Extra Strength) 500 mg tablet Take 1 tablet (500 mg total) by mouth every 6 (six) hours as needed for mild pain (Patient not taking: Reported on 7/10/2023) 120 tablet 1   • benztropine (COGENTIN) 1 mg tablet Take 1 mg by mouth 2 (two) times a day (Patient not taking: Reported on 7/10/2023)     • busPIRone (BUSPAR) 10 mg tablet Take 20 mg by mouth 3 (three) times a day  (Patient not taking: Reported on 7/10/2023)     • hydrOXYzine HCL (ATARAX) 50 mg tablet Take 50 mg by mouth 3 (three) times a day as needed for itching (Patient not taking: Reported on 7/10/2023)     • lidocaine (LMX) 4 % cream Apply topically as needed for mild pain (Patient not taking: Reported on 7/10/2023) 30 g 0   • lithium carbonate (LITHOBID) 300 mg CR tablet TAKE 1 TABLET BY MOUTH DAILY AT BEDTIME (Patient not taking: Reported on 7/10/2023) 90 tablet 1   • Multiple Vitamin (multivitamin) tablet Take 1 tablet by mouth daily (Patient not taking: Reported on 7/10/2023) 30 tablet 1   • pantoprazole (PROTONIX) 20 mg tablet Take 20 mg by mouth daily (Patient not taking: Reported on 7/10/2023)     • PARoxetine (PAXIL) 30 mg tablet Take 60 mg by mouth 2 (two) times a day.  Takes two (60 mg) tabs by mouth nightly   Indications: Social Anxiety Disorder (Patient not taking: Reported on 7/10/2023)     • polyethylene glycol (MIRALAX) 17 g packet Take 17 g by mouth daily (Patient not taking: Reported on 7/10/2023) 100 each 0   • prazosin (MINIPRESS) 2 mg capsule Take 2 mg by mouth daily at bedtime. Indications: Frightening Dreams (Patient not taking: Reported on 7/10/2023)     • [DISCONTINUED] Butalbital-APAP-Caffeine (Fioricet) -40 MG CAPS Take 1 tablet by mouth daily as needed (headache) (Patient not taking: Reported on 7/10/2023) 10 capsule 0   • [DISCONTINUED] pregabalin (LYRICA) 50 mg capsule 1 capsule twice a day. May increase to 1 capsule 3 times per day if needed. 90 capsule 3     No current facility-administered medications on file prior to visit. 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 Leninbebeto Serena   she does have family history of seizures. The following portions of the patient's history were reviewed and updated as appropriate: allergies, current medications, past family history, past medical history, past social history, past surgical history and problem list.         Objective:    Blood pressure 146/94, pulse 78, temperature 97.8 °F (36.6 °C), temperature source Tympanic, resp. rate 18, height 5' 1" (1.549 m), weight 68.7 kg (151 lb 6.4 oz), SpO2 99 %, not currently breastfeeding. Physical Exam  Vitals reviewed. Constitutional:       General: She is not in acute distress. HENT:      Head: Normocephalic.       Right Ear: External ear normal.      Left Ear: External ear normal.      Nose: Nose normal.      Mouth/Throat:      Mouth: Mucous membranes are moist. Pharynx: Oropharynx is clear. Eyes:      Extraocular Movements: Extraocular movements intact. Pupils: Pupils are equal, round, and reactive to light. Cardiovascular:      Rate and Rhythm: Normal rate. Pulses: Normal pulses. Pulmonary:      Effort: Pulmonary effort is normal.   Abdominal:      General: There is no distension. Musculoskeletal:      Right lower leg: No edema. Left lower leg: No edema. Skin:     Findings: No rash. Neurological:      Mental Status: She is alert. Mental status is at baseline. Sensory: No sensory deficit. Motor: Weakness present. Gait: Gait abnormal.      Deep Tendon Reflexes:      Reflex Scores:       Brachioradialis reflexes are 2+ on the right side and 2+ on the left side. Patellar reflexes are 2+ on the right side and 3+ on the left side. Psychiatric:         Attention and Perception: Attention normal.         Mood and Affect: Mood is anxious. Behavior: Behavior is cooperative. Comments: MOCA 21/30 today         Neurological Exam  Mental Status  Alert. Speech: Speech is understandable, slight slurring at times because of abnormal facial movements. Viincio Precise of knowledge is appropriate for level of education. Cranial Nerves  CN II: Right visual acuity: counts fingers. Left visual acuity: counts fingers. CN III, IV, VI: Extraocular movements intact bilaterally. Pupils equal round and reactive to light bilaterally. CN V: Facial sensation is normal.  CN VII: Full and symmetric facial movement. CN VIII: Hearing is normal.  CN IX, X: Palate elevates symmetrically  CN XI: Shoulder shrug strength is normal.  CN XII: Tongue midline without atrophy or fasciculations. Motor   Strength is 5/5 in all four extremities except as noted. 4+/5 L sided weakness which is chronic  Bilateral shoulder weakness secondary to pain  No obvious tremor on exam today. Sensory  Light touch is normal in upper and lower extremities.      Reflexes Right                      Left  Brachioradialis                    2+                         2+  Patellar                                2+                         3+    Gait   Abnormal gait. Casual gait: Wide stance. Normal stride length. Unable to rise from chair without using arms. Walks slowly with walker, no ataxia. ROS:    Review of Systems   Constitutional: Positive for appetite change (eating less). Negative for fatigue and fever. HENT: Positive for tinnitus (left ear). Negative for hearing loss, trouble swallowing (a few weeks ago) and voice change. Eyes: Negative. Negative for photophobia, pain and visual disturbance. Respiratory: Negative. Negative for shortness of breath. Cardiovascular: Negative. Negative for palpitations. Gastrointestinal: Positive for constipation, nausea and vomiting. Endocrine: Negative. Negative for cold intolerance. Genitourinary: Positive for frequency. Negative for dysuria and urgency. Musculoskeletal: Positive for back pain (low mark pain and upper back), neck pain and neck stiffness. Negative for gait problem and myalgias. Patient fell last month, but has fallen several times before that   Skin: Negative. Negative for rash. Allergic/Immunologic: Negative. Neurological: Positive for numbness (face and feet) and headaches (few times a day). Negative for dizziness, tremors (hands), seizures, syncope, facial asymmetry, speech difficulty, weakness and light-headedness. Hematological: Negative. Does not bruise/bleed easily. Psychiatric/Behavioral: Positive for sleep disturbance. Negative for confusion and hallucinations. The patient is nervous/anxious (anxiety).     ROS was reviewed and updated as appropriate

## 2023-07-10 NOTE — PATIENT INSTRUCTIONS
Restart lyrica for pain, continue ingrezza at low dose-this was sent to the pharmacy lately; let us know if you do not get the medication. Continue aspirin/lipitor for stroke prevention  Memory testing today is improved; continue with plans to see neuropsychology  Make sure to follow up with psychiatry to discuss medications to prevent panic attacks and to address sleep; I think once you are back on some of your medications your headaches will improve. Continue with trying to stay active at home  Follow up in 4 months.

## 2023-07-12 ENCOUNTER — OFFICE VISIT (OUTPATIENT)
Dept: PHYSICAL THERAPY | Facility: CLINIC | Age: 58
End: 2023-07-12
Payer: MEDICARE

## 2023-07-12 DIAGNOSIS — Z96.653 TOTAL KNEE REPLACEMENT STATUS, BILATERAL: Primary | ICD-10-CM

## 2023-07-12 DIAGNOSIS — Z96.653 STATUS POST BILATERAL KNEE REPLACEMENTS: ICD-10-CM

## 2023-07-12 PROCEDURE — 97110 THERAPEUTIC EXERCISES: CPT

## 2023-07-12 PROCEDURE — 97112 NEUROMUSCULAR REEDUCATION: CPT

## 2023-07-12 PROCEDURE — 97140 MANUAL THERAPY 1/> REGIONS: CPT

## 2023-07-12 PROCEDURE — 97530 THERAPEUTIC ACTIVITIES: CPT

## 2023-07-12 NOTE — PROGRESS NOTES
Daily Note     Today's date: 2023  Patient name: Trevor Rasheed  : 1965  MRN: [id number]  Referring provider: Joyce Bae  Dx:   Encounter Diagnosis     ICD-10-CM    1. Total knee replacement status, bilateral  Z96.653       2. Status post bilateral knee replacements  Z96.653           Start Time: 0745  Stop Time: 0855  Total time in clinic (min): 70 minutes    Subjective: Pt presents to PT reporting she has some stiffness and knee pain today. Pt states she missed therapy for a couple of sessions and can tell how that has affected her knees. Objective: See treatment diary below      Assessment: Tolerated treatment well continuing to work towards mobility and strength goals. Pt continues to show challenge with current exercise program. Pt needed minimal VCing for form, technique and count of exercises. Patient demonstrated fatigue post treatment, exhibited good technique with therapeutic exercises and would benefit from continued PT to increase flexibility, strength and function. Pt required occ rest periods throughout session. Plan: Continue per plan of care.       Precautions: s/p TKA (R TKA 3/9/22, L TKA 22)    Date      Visit # IE 2 3 4 5 6 7 8     FOTO IE             Re-eval IE              Manuals      B/l knee PROM   NS flexion extension nv PK PK Ext PK ext HY     Hamstring stretch       PK HY                               Neuro Re-Ed      Quad sets 3x5" ea seated on chair 10x3" ea supine 10x 10" hold ea side 20x3" sup ea  supine 10x 10" hold ea side       clamshells 10x OTB 20x PTB 2x10 ptb   np       LAQ 10x ea 15x3" ea 15x ea 5" hold 2x10 5" ea 2x10 5" ea 1# 2x10 5" ea 1# 2x10 5" ea 1# 2x10 5" ea 2#     SAQ  15x3" ea 2x10 ea side 2x10 ea 2x10 5" ea 1# np  2x10 5" ea 2#     TKE             bridges  nv 2x10 2x**         Weight shifts  15x s/s SLS             Ther Ex 6/8 6/14 6/16 6/21 6/23 6/28 6/30 7/12     bike  6' 6' 6' 6' 6' 6' 6'     Leg press       65# 20x 75# 20x     Seated march 5x ea 20x  20x ea  np Stand1# 15x ea  2# 2x10     Glut sets     5" x 15 5" x 15  15x5"     Hamstring curl stand  nv Sitting ptb 2x10  PTB  15x  GTB       Standing hip abd/ext  10x ea  nv  1# 15x ea 1# 10x2 ea 2# 2x10 ea     Hamstring stretch 2x20" ea sit 3x20" ea sit           Supine heel slide  10x10" ea 15x 5" hold ea side 20x5" ea 20x5" ea        SLR  10x ea 10x ea side 2x10 ea  15x ea       Side steps      2 laps       Ther Activity 6/8 6/14 6/16 6/21 6/23 6/28 6/30 7/12     Sit to stands  2x10 2x10 2x10         Pt edu   NS DK  PK PK HY     Step ups  10x ea 0R    10x 1R 10x2 1R 10x2 1R     Gait Training 6/8 6/14 6/16 6/21 6/23 6/28 6/30 7/12     Walking with walker  3 laps  2 laps         Walking with cane  2x10'    3 x 15' 3 x 15' 3x15'     Obstacle course with walker                          Modalities 6/8 6/16 6/23 6/28 6/30 7/12

## 2023-07-17 ENCOUNTER — OFFICE VISIT (OUTPATIENT)
Dept: URGENT CARE | Facility: MEDICAL CENTER | Age: 58
End: 2023-07-17
Payer: MEDICARE

## 2023-07-17 ENCOUNTER — APPOINTMENT (EMERGENCY)
Dept: CT IMAGING | Facility: HOSPITAL | Age: 58
DRG: 092 | End: 2023-07-17
Payer: MEDICARE

## 2023-07-17 ENCOUNTER — HOSPITAL ENCOUNTER (EMERGENCY)
Facility: HOSPITAL | Age: 58
Discharge: HOME | DRG: 092 | End: 2023-07-17
Attending: EMERGENCY MEDICINE
Payer: MEDICARE

## 2023-07-17 ENCOUNTER — OFFICE VISIT (OUTPATIENT)
Dept: PHYSICAL THERAPY | Facility: CLINIC | Age: 58
End: 2023-07-17
Payer: MEDICARE

## 2023-07-17 VITALS
TEMPERATURE: 97.7 F | OXYGEN SATURATION: 100 % | RESPIRATION RATE: 20 BRPM | SYSTOLIC BLOOD PRESSURE: 139 MMHG | HEART RATE: 76 BPM | DIASTOLIC BLOOD PRESSURE: 86 MMHG

## 2023-07-17 VITALS
RESPIRATION RATE: 20 BRPM | HEART RATE: 88 BPM | HEIGHT: 61 IN | SYSTOLIC BLOOD PRESSURE: 123 MMHG | BODY MASS INDEX: 28.32 KG/M2 | TEMPERATURE: 98.4 F | DIASTOLIC BLOOD PRESSURE: 85 MMHG | WEIGHT: 150 LBS | OXYGEN SATURATION: 100 %

## 2023-07-17 DIAGNOSIS — Z96.653 STATUS POST BILATERAL KNEE REPLACEMENTS: ICD-10-CM

## 2023-07-17 DIAGNOSIS — F41.9 ANXIETY: Primary | ICD-10-CM

## 2023-07-17 DIAGNOSIS — R51.9 ACUTE NONINTRACTABLE HEADACHE, UNSPECIFIED HEADACHE TYPE: ICD-10-CM

## 2023-07-17 DIAGNOSIS — R20.0 LEFT FACIAL NUMBNESS: Primary | ICD-10-CM

## 2023-07-17 DIAGNOSIS — R51.9 HEADACHE: ICD-10-CM

## 2023-07-17 DIAGNOSIS — Z96.653 TOTAL KNEE REPLACEMENT STATUS, BILATERAL: Primary | ICD-10-CM

## 2023-07-17 LAB
ANION GAP SERPL CALCULATED.3IONS-SCNC: 9 MMOL/L
ATRIAL RATE: 80 BPM
BASOPHILS # BLD AUTO: 0.02 THOUSANDS/ÂΜL (ref 0–0.1)
BASOPHILS NFR BLD AUTO: 0 % (ref 0–1)
BUN SERPL-MCNC: 10 MG/DL (ref 5–25)
CALCIUM SERPL-MCNC: 9.2 MG/DL (ref 8.4–10.2)
CARDIAC TROPONIN I PNL SERPL HS: 5 NG/L
CHLORIDE SERPL-SCNC: 107 MMOL/L (ref 96–108)
CO2 SERPL-SCNC: 26 MMOL/L (ref 21–32)
CREAT SERPL-MCNC: 0.76 MG/DL (ref 0.6–1.3)
EOSINOPHIL # BLD AUTO: 0.04 THOUSAND/ÂΜL (ref 0–0.61)
EOSINOPHIL NFR BLD AUTO: 1 % (ref 0–6)
ERYTHROCYTE [DISTWIDTH] IN BLOOD BY AUTOMATED COUNT: 13.7 % (ref 11.6–15.1)
GFR SERPL CREATININE-BSD FRML MDRD: 86 ML/MIN/1.73SQ M
GLUCOSE SERPL-MCNC: 92 MG/DL (ref 65–140)
HCT VFR BLD AUTO: 42.8 % (ref 34.8–46.1)
HGB BLD-MCNC: 14.7 G/DL (ref 11.5–15.4)
IMM GRANULOCYTES # BLD AUTO: 0.01 THOUSAND/UL (ref 0–0.2)
IMM GRANULOCYTES NFR BLD AUTO: 0 % (ref 0–2)
LYMPHOCYTES # BLD AUTO: 1.11 THOUSANDS/ÂΜL (ref 0.6–4.47)
LYMPHOCYTES NFR BLD AUTO: 20 % (ref 14–44)
MCH RBC QN AUTO: 29.9 PG (ref 26.8–34.3)
MCHC RBC AUTO-ENTMCNC: 34.3 G/DL (ref 31.4–37.4)
MCV RBC AUTO: 87 FL (ref 82–98)
MONOCYTES # BLD AUTO: 0.29 THOUSAND/ÂΜL (ref 0.17–1.22)
MONOCYTES NFR BLD AUTO: 5 % (ref 4–12)
NEUTROPHILS # BLD AUTO: 4.11 THOUSANDS/ÂΜL (ref 1.85–7.62)
NEUTS SEG NFR BLD AUTO: 74 % (ref 43–75)
NRBC BLD AUTO-RTO: 0 /100 WBCS
P AXIS: 72 DEGREES
PLATELET # BLD AUTO: 305 THOUSANDS/UL (ref 149–390)
PMV BLD AUTO: 9.5 FL (ref 8.9–12.7)
POTASSIUM SERPL-SCNC: 3.1 MMOL/L (ref 3.5–5.3)
PR INTERVAL: 138 MS
QRS AXIS: 94 DEGREES
QRSD INTERVAL: 96 MS
QT INTERVAL: 348 MS
QTC INTERVAL: 401 MS
RBC # BLD AUTO: 4.91 MILLION/UL (ref 3.81–5.12)
SODIUM SERPL-SCNC: 142 MMOL/L (ref 135–147)
T WAVE AXIS: 49 DEGREES
VENTRICULAR RATE: 80 BPM
WBC # BLD AUTO: 5.58 THOUSAND/UL (ref 4.31–10.16)

## 2023-07-17 PROCEDURE — 80048 BASIC METABOLIC PNL TOTAL CA: CPT | Performed by: EMERGENCY MEDICINE

## 2023-07-17 PROCEDURE — G1004 CDSM NDSC: HCPCS

## 2023-07-17 PROCEDURE — 36415 COLL VENOUS BLD VENIPUNCTURE: CPT | Performed by: EMERGENCY MEDICINE

## 2023-07-17 PROCEDURE — 99284 EMERGENCY DEPT VISIT MOD MDM: CPT

## 2023-07-17 PROCEDURE — 85025 COMPLETE CBC W/AUTO DIFF WBC: CPT | Performed by: EMERGENCY MEDICINE

## 2023-07-17 PROCEDURE — 99284 EMERGENCY DEPT VISIT MOD MDM: CPT | Performed by: EMERGENCY MEDICINE

## 2023-07-17 PROCEDURE — 93010 ELECTROCARDIOGRAM REPORT: CPT | Performed by: STUDENT IN AN ORGANIZED HEALTH CARE EDUCATION/TRAINING PROGRAM

## 2023-07-17 PROCEDURE — 96374 THER/PROPH/DIAG INJ IV PUSH: CPT

## 2023-07-17 PROCEDURE — 96361 HYDRATE IV INFUSION ADD-ON: CPT

## 2023-07-17 PROCEDURE — 97112 NEUROMUSCULAR REEDUCATION: CPT

## 2023-07-17 PROCEDURE — 99213 OFFICE O/P EST LOW 20 MIN: CPT

## 2023-07-17 PROCEDURE — 97110 THERAPEUTIC EXERCISES: CPT

## 2023-07-17 PROCEDURE — 84484 ASSAY OF TROPONIN QUANT: CPT | Performed by: EMERGENCY MEDICINE

## 2023-07-17 PROCEDURE — 70450 CT HEAD/BRAIN W/O DYE: CPT

## 2023-07-17 PROCEDURE — 93005 ELECTROCARDIOGRAM TRACING: CPT

## 2023-07-17 RX ORDER — LORAZEPAM 2 MG/ML
0.5 INJECTION INTRAMUSCULAR ONCE
Status: COMPLETED | OUTPATIENT
Start: 2023-07-17 | End: 2023-07-17

## 2023-07-17 RX ORDER — ACETAMINOPHEN 325 MG/1
650 TABLET ORAL ONCE
Status: COMPLETED | OUTPATIENT
Start: 2023-07-17 | End: 2023-07-17

## 2023-07-17 RX ADMIN — SODIUM CHLORIDE 1000 ML: 0.9 INJECTION, SOLUTION INTRAVENOUS at 14:13

## 2023-07-17 RX ADMIN — ACETAMINOPHEN 325MG 650 MG: 325 TABLET ORAL at 15:02

## 2023-07-17 RX ADMIN — LORAZEPAM 0.5 MG: 2 INJECTION INTRAMUSCULAR; INTRAVENOUS at 14:14

## 2023-07-17 NOTE — PROGRESS NOTES
[facility]        NAME: Filiberto Fuentes is a 59 y.o. female  : 1965    MRN: [id number]  DATE: 2023  TIME: 11:52 AM    Assessment and Plan   Left facial numbness [R20.0]  1. Left facial numbness  Transfer to other facility      2. Acute nonintractable headache, unspecified headache type          Discussed with patient that due to her specifically stating her left neck has shooting pain to her head, left facial numbness which she never had before, and also stating that she feels like she has difficulty getting words out - she is agreeable to go the the ED for further evaluation. An ambulance was called for patient and she was taken to [address] [place] [address] ED charge RN was tigertexted to make aware. Report given to [facility] mobile stroke team.    Patient Instructions   Ambulance was called and Samantha is being transported there for further evaluation. Chief Complaint     Chief Complaint   Patient presents with   • Anxiety     Patient states she was feeling fine this am she called an Sincere Martinez to take her to the doctor and began with a panic attack, she took an ativan 0.5 mg and states it did nothing at all. She has an appointment with GI today. She is having cramping in her hands and is SOB         History of Present Illness       Patient states she had panic attack this morning prior to going to Physical therapy around 830am this morning. They were able to help her get through it. Went home and was waiting for Sincere Martinez to see GI and was starting to have anxiety again. Sincere Martinez  went to wrong place and dropped her off here - more anxiety. She is currently reporting anxiety plus shooting pain up her left neck, left facial numbness, generalized muscle spasms, and states she feels she has difficulty getting words out (she speech is not clear but I do not know her baseline). Review of Systems   Review of Systems   Constitutional: Positive for activity change.    Respiratory: Negative for cough, shortness of breath and wheezing. Genitourinary: Negative for flank pain. Musculoskeletal: Positive for myalgias and neck pain (Shooting pain up the left side of her neck to base of skull. ). Neurological: Positive for speech difficulty, weakness and headaches. Negative for dizziness and light-headedness. Psychiatric/Behavioral: Negative for suicidal ideas. The patient is nervous/anxious. Current Medications       Current Outpatient Medications:   •  acetaminophen (TYLENOL) 650 mg CR tablet, Take 1 tablet (650 mg total) by mouth every 8 (eight) hours as needed for mild pain, Disp: 30 tablet, Rfl: 0  •  amLODIPine (NORVASC) 10 mg tablet, TAKE 1 TABLET BY MOUTH EVERY DAY, Disp: 90 tablet, Rfl: 3  •  aspirin (ECOTRIN LOW STRENGTH) 81 mg EC tablet, Take 1 tablet (81 mg total) by mouth daily, Disp: 90 tablet, Rfl: 3  •  atorvastatin (LIPITOR) 40 mg tablet, TAKE 1 TABLET BY MOUTH EVERY DAY, Disp: 90 tablet, Rfl: 3  •  docusate sodium (COLACE) 100 mg capsule, Take 1 capsule (100 mg total) by mouth every 12 (twelve) hours, Disp: 60 capsule, Rfl: 0  •  ferrous sulfate 324 (65 Fe) mg, Take 1 tablet (324 mg total) by mouth daily, Disp: 30 tablet, Rfl: 1  •  folic acid (FOLVITE) 1 mg tablet, TAKE 1 TABLET BY MOUTH EVERY DAY, Disp: 90 tablet, Rfl: 1  •  hydrOXYzine HCL (ATARAX) 50 mg tablet, Take 50 mg by mouth 3 (three) times a day as needed for itching, Disp: , Rfl:   •  Ingrezza 40 MG CAPS, Take 40 mg by mouth in the morning, Disp: 30 capsule, Rfl: 5  •  LORazepam (ATIVAN) 0.5 mg tablet, , Disp: , Rfl:   •  mirtazapine (REMERON) 7.5 MG tablet, TAKE 1 TABLET BY MOUTH EVERY EVENING., Disp: 90 tablet, Rfl: 1  •  pregabalin (LYRICA) 50 mg capsule, 1 capsule twice a day. May increase to 1 capsule 3 times per day if needed. , Disp: 90 capsule, Rfl: 3  •  QUEtiapine (SEROquel) 50 mg tablet, TAKE 1 TAB (50 MG) BY MOUTH 3 TIMES A DAY. (9 AM, 4 PM, AND AT BEDTIME), Disp: , Rfl:   •  acetaminophen (Acetaminophen Extra Strength) 500 mg tablet, Take 1 tablet (500 mg total) by mouth every 6 (six) hours as needed for mild pain (Patient not taking: Reported on 7/17/2023), Disp: 120 tablet, Rfl: 1  •  benztropine (COGENTIN) 1 mg tablet, Take 1 mg by mouth 2 (two) times a day (Patient not taking: Reported on 7/10/2023), Disp: , Rfl:   •  busPIRone (BUSPAR) 10 mg tablet, Take 20 mg by mouth 3 (three) times a day  (Patient not taking: Reported on 7/10/2023), Disp: , Rfl:   •  lidocaine (LMX) 4 % cream, Apply topically as needed for mild pain (Patient not taking: Reported on 7/10/2023), Disp: 30 g, Rfl: 0  •  lithium carbonate (LITHOBID) 300 mg CR tablet, TAKE 1 TABLET BY MOUTH DAILY AT BEDTIME (Patient not taking: Reported on 7/10/2023), Disp: 90 tablet, Rfl: 1  •  LORazepam (Ativan) 0.5 mg tablet, Take 1 tablet (0.5 mg total) by mouth every 8 (eight) hours as needed for anxiety for up to 5 days (Patient taking differently: Take 0.5 mg by mouth 2 (two) times a day), Disp: 5 tablet, Rfl: 0  •  Multiple Vitamin (multivitamin) tablet, Take 1 tablet by mouth daily (Patient not taking: Reported on 7/10/2023), Disp: 30 tablet, Rfl: 1  •  pantoprazole (PROTONIX) 20 mg tablet, Take 20 mg by mouth daily (Patient not taking: Reported on 7/10/2023), Disp: , Rfl:   •  PARoxetine (PAXIL) 30 mg tablet, Take 60 mg by mouth 2 (two) times a day. Takes two (60 mg) tabs by mouth nightly   Indications: Social Anxiety Disorder (Patient not taking: Reported on 7/10/2023), Disp: , Rfl:   •  polyethylene glycol (MIRALAX) 17 g packet, Take 17 g by mouth daily (Patient not taking: Reported on 7/10/2023), Disp: 100 each, Rfl: 0  •  prazosin (MINIPRESS) 2 mg capsule, Take 2 mg by mouth daily at bedtime.  Indications: Frightening Dreams (Patient not taking: Reported on 7/10/2023), Disp: , Rfl:     Current Allergies     Allergies as of 07/17/2023   • (No Known Allergies)            The following portions of the patient's history were reviewed and updated as appropriate: allergies, current medications, past family history, past medical history, past social history, past surgical history and problem list.     Past Medical History:   Diagnosis Date   • Anxiety    • Arthritis     left knee   • Arthritis of right knee 10/6/2020   • At risk for falls    • Bipolar 2 disorder ([address])     FOLLOWS WITH PSYCHIATRIST. CONTINUE LAMOTRIGINE; RESOLVED: [id number]   • Depression    • Familial tremor     both hands   • Fibromyalgia     LAST ASSESSED: [id number]   • GERD (gastroesophageal reflux disease)    • Hearing aid worn     left ear   • [place] (hard of hearing)     left ear   • Hyperlipidemia    • Hypertension    • Left-sided weakness    • Lumbar disc disease with radiculopathy 2/2/2018   • Memory loss of unknown cause     long and short term   • Migraine    • Multiple closed fractures of metatarsal bone of right foot 5/2/2021   • Obesity    • Obesity, Class II, BMI 35-39.9    • Osteoarthritis of both hips 10/31/2016   • Osteoarthritis of knee 2/20/2013    Description: Continue Tylenol and Naproxen. Encourage exercise and weight loss. Patient refused physical therapy. I will refer the patient back to Orthopedics. • Overactive bladder    • Panic attack    • Patellofemoral disorder of both knees 5/1/2020   • Post traumatic stress disorder    • Primary localized osteoarthritis of both knees 6/16/2017   • Primary osteoarthritis of both knees 5/1/2020   • S/P insertion of spinal cord stimulator     no remote   • S/P total knee arthroplasty, right 3/10/2022   • Sacroiliitis ([address]) 2/28/2017   • Seasonal allergies    • Seizure-like activity ([address]) 6/3/2022   • Seizures ([address])     possible seizure like activity   • Status post total knee replacement, left 7/5/2022   • Stroke 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   CONT ASA 81 MG DAILY; RESOLVED: [id number]   • Urinary incontinence    • Uses walker    • Wears dentures     partial lower / full upper- doesnt wear   • Wears glasses        Past Surgical History:   Procedure Laterality Date   • BACK SURGERY     • [address]   • COLONOSCOPY      RESOLVED: [id number]   • EAR SURGERY     • EGD     • HYSTERECTOMY  2004   • MYRINGOTOMY W/ TUBES Left    • NECK SURGERY  04/2019   • WA ARTHRP KNE CONDYLE&PLATU MEDIAL&LAT COMPARTMENTS Right 3/9/2022    Procedure: ARTHROPLASTY KNEE TOTAL;  Surgeon: Shanique Coronel DO;  Location: [facility];  Service: Orthopedics   • WA ARTHRP KNE CONDYLE&PLATU MEDIAL&LAT COMPARTMENTS Left 7/5/2022    Procedure: ARTHROPLASTY KNEE TOTAL;  Surgeon: Shanique Coronel DO;  Location: [facility];  Service: Orthopedics   • WA CYSTOURETHROSCOPY N/A 2/18/2016    Procedure: CYSTOSCOPY, BOTOX INJECTION;  Surgeon: Nikki Bazzi MD;  Location: [facility];  Service: Gynecology   • WA INSJ/RPLCMT SPI NPGR DIR/INDUXIVE COUPLING Right 2/10/2021    Procedure: REPLACEMENT IMPLANTABLE PULSE GENERATOR DORSAL SPINAL COLUMN STIMULATOR, RIGHT;  Surgeon: Roberto Webb MD;  Location: [facility];  Service: Neurosurgery   • WA PRQ IMPLTJ NSTIM ELECTRODE ARRAY EPIDURAL Right 7/28/2020    Procedure: INSERTION THORACIC DORSAL COLUMN SPINAL CORD STIMULATOR PERCUTANEOUS W IMPLANTABLE PULSE GENERATOR, RIGHT;  Surgeon: Roberto Webb MD;  Location: [facility];  Service: Neurosurgery   • TONSILLECTOMY     • TUBAL LIGATION  1986   • UPPER GASTROINTESTINAL ENDOSCOPY  09/2020       Family History   Problem Relation Age of Onset   • Colon cancer Mother    • Alzheimer's disease Father    • Stroke Father    • Colon cancer Brother    • Bipolar disorder Brother    • Breast cancer Maternal Aunt    • Colon cancer Maternal Aunt    • Heart disease Other    • Diabetes Other    • Hypertension Other    • Seizures Son    • Depression Paternal Grandfather    • No Known Problems Sister    • No Known Problems Brother    • Thyroid disease Neg Hx Medications have been verified. Objective   /85   Pulse 88   Temp 98.4 °F (36.9 °C)   Resp 20   Ht 5' 1" (1.549 m)   Wt 68 kg (150 lb)   SpO2 100%   BMI 28.34 kg/m²   No LMP recorded. Patient has had a hysterectomy. Physical Exam     Physical Exam  Vitals and nursing note reviewed. Cardiovascular:      Rate and Rhythm: Tachycardia present. Pulses: Normal pulses. Pulmonary:      Effort: Pulmonary effort is normal.      Breath sounds: Normal breath sounds. Skin:     General: Skin is warm and dry. Capillary Refill: Capillary refill takes less than 2 seconds. Neurological:      Mental Status: She is alert and oriented to person, place, and time. GCS: GCS eye subscore is 4. GCS verbal subscore is 5. GCS motor subscore is 6. Cranial Nerves: Facial asymmetry present. Motor: Weakness present. Psychiatric:         Mood and Affect: Mood is anxious. Speech: Speech is delayed and slurred. Behavior: Behavior is not aggressive, hyperactive or combative. Behavior is cooperative. Thought Content:  Thought content normal.

## 2023-07-17 NOTE — PROGRESS NOTES
Daily Note     Today's date: 2023  Patient name: Jonny Eastman  : 1965  MRN: [id number]  Referring provider: Sobia Carolina  Dx:   Encounter Diagnosis     ICD-10-CM    1. Total knee replacement status, bilateral  Z96.653       2. Status post bilateral knee replacements  Z96.653           Start Time: 0820  Stop Time: 0910  Total time in clinic (min): 50 minutes    Subjective: Pt presents to PT reporting she has been having some familial struggles and is experiencing a panic attack. Pt questioned [address] about calling PCP's office in regard to attacks. PCP recommended if it is a life threatening situation to go to the ER, if not then provided the crisis line. Pt refused ER and was provided with Crisis line phone number. Pt reports she wants to do what she can in regard to today's PT session. Objective: See treatment diary below      Assessment: Tolerated treatment well continuing to work towards mobility and strength goals. Pt performed today's exercises with reduced weight and intensity focusing today's session on emotional regulation, breathing techniques, and general physical activity. Patient demonstrated fatigue post treatment, exhibited good technique with therapeutic exercises and would benefit from continued PT to increase flexibility, strength and function. Pt's overall affect and emotional status improved upon completion of today's session. Plan: Continue per plan of care.       Precautions: s/p TKA (R TKA 3/9/22, L TKA 22)    Date     Visit # IE 2 3 4 5 6 7 8 9    FOTO IE             Re-eval IE              Manuals     B/l knee PROM   NS flexion extension nv PK PK Ext PK ext HY nv    Hamstring stretch       PK HY                               Neuro Re-Ed     Quad sets 3x5" ea seated on chair 10x3" ea supine 10x 10" hold ea side 20x3" sup ea supine 10x 10" hold ea side       clamshells 10x OTB 20x PTB 2x10 ptb   np       LAQ 10x ea 15x3" ea 15x ea 5" hold 2x10 5" ea 2x10 5" ea 1# 2x10 5" ea 1# 2x10 5" ea 1# 2x10 5" ea 2# 2x10 5" ea 2#    SAQ  15x3" ea 2x10 ea side 2x10 ea 2x10 5" ea 1# np  2x10 5" ea 2# 2x10 5" ea 2#    TKE             bridges  nv 2x10 2x**         Weight shifts  15x s/s            SLS             Ther Ex 6/8 6/14 6/16 6/21 6/23 6/28 6/30 7/12 7/17    bike  6' 6' 6' 6' 6' 6' 6' 6'    Leg press       65# 20x 75# 20x 75# 20x    Seated march 5x ea 20x  20x ea  np Stand1# 15x ea  2# 2x10 2x10 2#    Glut sets     5" x 15 5" x 15  15x5" 15x5"    Hamstring curl stand  nv Sitting ptb 2x10  PTB  15x  GTB       Standing hip abd/ext  10x ea  nv  1# 15x ea 1# 10x2 ea 2# 2x10 ea nv    Hamstring stretch 2x20" ea sit 3x20" ea sit           Supine heel slide  10x10" ea 15x 5" hold ea side 20x5" ea 20x5" ea        SLR  10x ea 10x ea side 2x10 ea  15x ea   2x10    Side steps      2 laps       Ther Activity 6/8 6/14 6/16 6/21 6/23 6/28 6/30 7/12 7/17    Sit to stands  2x10 2x10 2x10         Pt edu   NS DK  PK PK HY HY    Step ups  10x ea 0R    10x 1R 10x2 1R 10x2 1R nv    Gait Training 6/8 6/14 6/16 6/21 6/23 6/28 6/30 7/12 7/17    Walking with walker  3 laps  2 laps         Walking with cane  2x10'    3 x 15' 3 x 15' 3x15' nv    Obstacle course with walker                          Modalities 6/8 6/16 6/23 6/28 6/30 7/12 7/17

## 2023-07-17 NOTE — ED PROVIDER NOTES
History  Chief Complaint   Patient presents with   • Anxiety     Arrives via ems from [facility] care. Went to [facility] for anxiety attack. Sent to ED d/t alleged right sided numbness. None for ems once pt calmed down. Pt panicking upon arrival.      A 53-year-old female with past medical history of bipolar disorder, anxiety, depression, fibromyalgia, hyperlipidemia, hypertension, CVA and tardive dyskinesia; presents from urgent care for evaluation of total body numbness and pain. Patient states she has been having more frequent panic attacks over the past several weeks, and this morning felt onset of a panic attack. Patient states since onset her symptoms have worsened. She also complains of a generalized headache and bilateral neck pain. She otherwise denies fever, chills, chest pain, shortness of breath, abdominal pain, nausea, vomiting, diarrhea, peripheral edema, rashes and focal weakness. Assessment and plan: Paresthesias, reportedly total body along with total body pain. She is currently neurologically intact. Suspect underlying anxiety. We will check lab work for electrolyte abnormality, anemia and CALE. Given prior history of CVA, will obtain CT head. History provided by:  Patient and medical records      Prior to Admission Medications   Prescriptions Last Dose Informant Patient Reported? Taking? Ingrezza 40 MG CAPS   No Yes   Sig: Take 40 mg by mouth in the morning   LORazepam (ATIVAN) 0.5 mg tablet   Yes Yes   LORazepam (Ativan) 0.5 mg tablet   No No   Sig: Take 1 tablet (0.5 mg total) by mouth every 8 (eight) hours as needed for anxiety for up to 5 days   Patient taking differently: Take 0.5 mg by mouth 2 (two) times a day   Multiple Vitamin (multivitamin) tablet Not Taking  No No   Sig: Take 1 tablet by mouth daily   Patient not taking: Reported on 7/10/2023   PARoxetine (PAXIL) 30 mg tablet Not Taking  Yes No   Sig: Take 60 mg by mouth 2 (two) times a day.  Takes two (60 mg) tabs by mouth nightly   Indications: Social Anxiety Disorder   Patient not taking: Reported on 7/10/2023   QUEtiapine (SEROquel) 50 mg tablet   Yes Yes   Sig: TAKE 1 TAB (50 MG) BY MOUTH 3 TIMES A DAY. (9 AM, 4 PM, AND AT BEDTIME)   amLODIPine (NORVASC) 10 mg tablet   No Yes   Sig: TAKE 1 TABLET BY MOUTH EVERY DAY   aspirin (ECOTRIN LOW STRENGTH) 81 mg EC tablet   No Yes   Sig: Take 1 tablet (81 mg total) by mouth daily   atorvastatin (LIPITOR) 40 mg tablet   No Yes   Sig: TAKE 1 TABLET BY MOUTH EVERY DAY   benztropine (COGENTIN) 1 mg tablet Not Taking  Yes No   Sig: Take 1 mg by mouth 2 (two) times a day   Patient not taking: Reported on 7/10/2023   busPIRone (BUSPAR) 10 mg tablet Not Taking  Yes No   Sig: Take 20 mg by mouth 3 (three) times a day    Patient not taking: Reported on 7/10/2023   docusate sodium (COLACE) 100 mg capsule   No Yes   Sig: Take 1 capsule (100 mg total) by mouth every 12 (twelve) hours   ferrous sulfate 324 (65 Fe) mg   No Yes   Sig: Take 1 tablet (324 mg total) by mouth daily   folic acid (FOLVITE) 1 mg tablet   No Yes   Sig: TAKE 1 TABLET BY MOUTH EVERY DAY   hydrOXYzine HCL (ATARAX) 50 mg tablet   Yes Yes   Sig: Take 50 mg by mouth 3 (three) times a day as needed for itching   lidocaine (LMX) 4 % cream Not Taking  No No   Sig: Apply topically as needed for mild pain   Patient not taking: Reported on 7/10/2023   lithium carbonate (LITHOBID) 300 mg CR tablet Not Taking  No No   Sig: TAKE 1 TABLET BY MOUTH DAILY AT BEDTIME   Patient not taking: Reported on 7/10/2023   mirtazapine (REMERON) 7.5 MG tablet   No Yes   Sig: TAKE 1 TABLET BY MOUTH EVERY EVENING.   pantoprazole (PROTONIX) 20 mg tablet Not Taking  Yes No   Sig: Take 20 mg by mouth daily   Patient not taking: Reported on 7/10/2023   polyethylene glycol (MIRALAX) 17 g packet Not Taking  No No   Sig: Take 17 g by mouth daily   Patient not taking: Reported on 7/10/2023   prazosin (MINIPRESS) 2 mg capsule Not Taking  Yes No   Sig: Take 2 mg by mouth daily at bedtime. Indications: Frightening Dreams   Patient not taking: Reported on 7/10/2023   pregabalin (LYRICA) 50 mg capsule   No Yes   Si capsule twice a day. May increase to 1 capsule 3 times per day if needed. Facility-Administered Medications: None       Past Medical History:   Diagnosis Date   • Anxiety    • Arthritis     left knee   • Arthritis of right knee 10/6/2020   • At risk for falls    • Bipolar 2 disorder (HCC)     FOLLOWS WITH PSYCHIATRIST. CONTINUE LAMOTRIGINE; RESOLVED: [id number]   • Depression    • Familial tremor     both hands   • Fibromyalgia     LAST ASSESSED: [id number]   • GERD (gastroesophageal reflux disease)    • Hearing aid worn     left ear   • [place]     left ear   • Hyperlipidemia    • Hypertension    • Left-sided weakness    • Lumbar disc disease with radiculopathy 2018   • Memory loss of unknown cause     long and short term   • Migraine    • Multiple closed fractures of metatarsal bone of right foot 2021   • Obesity    • Obesity, Class II, BMI 35-39.9    • Osteoarthritis of both hips 10/31/2016   • Osteoarthritis of knee 2013    Description: Continue Tylenol and Naproxen. Encourage exercise and weight loss. Patient refused physical therapy. I will refer the patient back to Orthopedics.    • Overactive bladder    • Panic attack    • Patellofemoral disorder of both knees 2020   • Post traumatic stress disorder    • Primary localized osteoarthritis of both knees 2017   • Primary osteoarthritis of both knees 2020   • S/P insertion of spinal cord stimulator     no remote   • S/P total knee arthroplasty, right 3/10/2022   • Sacroiliitis ([address]) 2017   • Seasonal allergies    • Seizure-like activity ([address]) 6/3/2022   • Seizures ([address])     possible seizure like activity   • Small bowel obstruction ([address]) 3/24/2023   • Status post total knee replacement, left 2022   • Stroke [facility])     questionable stroke    • Tardive dyskinesia     PATIENT STATES   • 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   CONT ASA 81 MG DAILY; RESOLVED: [id number]   • Urinary incontinence    • Uses walker    • Wears dentures     partial lower / full upper- doesnt wear   • Wears glasses        Past Surgical History:   Procedure Laterality Date   • BACK SURGERY     •  SECTION     • COLONOSCOPY      RESOLVED: [id number]   • EAR SURGERY     • EGD     • HYSTERECTOMY     • MYRINGOTOMY W/ TUBES Left    • NECK SURGERY  2019   • AR ARTHRP KNE CONDYLE&PLATU MEDIAL&LAT COMPARTMENTS Right 3/9/2022    Procedure: ARTHROPLASTY KNEE TOTAL;  Surgeon: Luisa Vyas DO;  Location: [facility];  Service: Orthopedics   • AR ARTHRP KNE CONDYLE&PLATU MEDIAL&LAT COMPARTMENTS Left 2022    Procedure: ARTHROPLASTY KNEE TOTAL;  Surgeon: Luisa Vyas DO;  Location: [facility];  Service: Orthopedics   • AR CYSTOURETHROSCOPY N/A 2016    Procedure: CYSTOSCOPY, BOTOX INJECTION;  Surgeon: Yodit Jasso MD;  Location: [facility];  Service: Gynecology   • AR INSJ/RPLCMT SPI NPGR DIR/INDUXIVE COUPLING Right 2/10/2021    Procedure: REPLACEMENT IMPLANTABLE PULSE GENERATOR DORSAL SPINAL COLUMN STIMULATOR, RIGHT;  Surgeon: Leonardo Nunn MD;  Location: [facility];  Service: Neurosurgery   • AR PRQ IMPLTJ NSTIM ELECTRODE ARRAY EPIDURAL Right 2020    Procedure: INSERTION THORACIC DORSAL COLUMN SPINAL CORD STIMULATOR PERCUTANEOUS W IMPLANTABLE PULSE GENERATOR, RIGHT;  Surgeon: Leonardo Nunn MD;  Location: [facility];  Service: Neurosurgery   • TONSILLECTOMY     • TUBAL LIGATION     • UPPER GASTROINTESTINAL ENDOSCOPY  2020       Family History   Problem Relation Age of Onset   • Colon cancer Mother    • Alzheimer's disease Father    • Stroke Father    • Colon cancer Brother    • Bipolar disorder Brother    • Breast cancer Maternal Aunt    • Colon cancer Maternal Aunt    • Heart disease Other    • Diabetes Other    • Hypertension Other    • Seizures Son    • Depression Paternal Grandfather    • No Known Problems Sister    • No Known Problems Brother    • Thyroid disease Neg Hx      I have reviewed and agree with the history as documented. E-Cigarette/Vaping   • E-Cigarette Use Never User      E-Cigarette/Vaping Substances   • Nicotine No    • THC No    • CBD No    • Flavoring No    • Other No    • Unknown No      Social History     Tobacco Use   • Smoking status: Never   • Smokeless tobacco: Never   Vaping Use   • Vaping Use: Never used   Substance Use Topics   • Alcohol use: Not Currently     Comment: 2 x year; being a social drinker as per all scripts    • Drug use: No       Review of Systems   Musculoskeletal: Positive for neck pain. Neurological: Positive for numbness and headaches. Psychiatric/Behavioral: The patient is nervous/anxious. All other systems reviewed and are negative. Physical Exam  Physical Exam  General Appearance: alert and oriented, nad, non toxic appearing  Skin:  Warm, dry, intact. No cyanosis  HEENT: Atraumatic, normocephalic. No eye drainage. Normal hearing. Moist mucous membranes. Neck: Supple, trachea midline  Cardiac: RRR; no murmurs, rub, gallops. No pedal edema, 2+ pulses  Pulmonary: lungs CTAB; no wheezes, rales, rhonchi  Gastrointestinal: abdomen soft, nontender, nondistended; no guarding or rebound tenderness; good bowel sounds, no mass or bruits  Extremities:  No deformities. No calf tenderness, no clubbing  Neuro:  CN 2-12 grossly intact. 5/5 motor strength to the bilateral upper and lower extremities. Sensation intact throughout. No pronator drift. Normal finger-to-nose to the bilateral upper extremities, normal heel-to-shin to the bilateral lower extremities.    Psych:  Normal mood and affect, normal judgement and insight      Vital Signs  ED Triage Vitals   Temperature Pulse Respirations Blood Pressure SpO2   07/17/23 1217 07/17/23 1217 07/17/23 1217 07/17/23 1217 07/17/23 1217   97.7 °F (36.5 °C) 76 20 139/86 100 %      Temp Source Heart Rate Source Patient Position - Orthostatic VS BP Location FiO2 (%)   07/17/23 1217 07/17/23 1217 07/17/23 1217 07/17/23 1217 --   Oral Monitor Lying Left arm       Pain Score       07/17/23 1334       10 - Worst Possible Pain           Vitals:    07/17/23 1217   BP: 139/86   Pulse: 76   Patient Position - Orthostatic VS: Lying         Visual Acuity      ED Medications  Medications   sodium chloride 0.9 % bolus 1,000 mL (0 mL Intravenous Stopped 7/17/23 1525)   LORazepam (ATIVAN) injection 0.5 mg (0.5 mg Intravenous Given 7/17/23 1414)   acetaminophen (TYLENOL) tablet 650 mg (650 mg Oral Given 7/17/23 1502)       Diagnostic Studies  Results Reviewed     Procedure Component Value Units Date/Time    HS Troponin 0hr (reflex protocol) [[id number]]  (Normal) Collected: 07/17/23 1347    Lab Status: Final result Specimen: Blood from Arm, Left Updated: 07/17/23 1415     hs TnI 0hr 5 ng/L     Basic metabolic panel [[id number]]  (Abnormal) Collected: 07/17/23 1347    Lab Status: Final result Specimen: Blood from Arm, Left Updated: 07/17/23 1411     Sodium 142 mmol/L      Potassium 3.1 mmol/L      Chloride 107 mmol/L      CO2 26 mmol/L      ANION GAP 9 mmol/L      BUN 10 mg/dL      Creatinine 0.76 mg/dL      Glucose 92 mg/dL      Calcium 9.2 mg/dL      eGFR 86 ml/min/1.73sq m     Narrative:      [place] guidelines for Chronic Kidney Disease (CKD):   •  Stage 1 with normal or high GFR (GFR > 90 mL/min/1.73 square meters)  •  Stage 2 Mild CKD (GFR = 60-89 mL/min/1.73 square meters)  •  Stage 3A Moderate CKD (GFR = 45-59 mL/min/1.73 square meters)  •  Stage 3B Moderate CKD (GFR = 30-44 mL/min/1.73 square meters)  •  Stage 4 Severe CKD (GFR = 15-29 mL/min/1.73 square meters)  •  Stage 5 End Stage CKD (GFR <15 mL/min/1.73 square meters)  Note: GFR calculation is accurate only with a steady state creatinine    CBC and differential [[id number]] Collected: 07/17/23 1347    Lab Status: Final result Specimen: Blood from Arm, Left Updated: 07/17/23 1355     WBC 5.58 Thousand/uL      RBC 4.91 Million/uL      Hemoglobin 14.7 g/dL      Hematocrit 42.8 %      MCV 87 fL      MCH 29.9 pg      MCHC 34.3 g/dL      RDW 13.7 %      MPV 9.5 fL      Platelets 261 Thousands/uL      nRBC 0 /100 WBCs      Neutrophils Relative 74 %      Immat GRANS % 0 %      Lymphocytes Relative 20 %      Monocytes Relative 5 %      Eosinophils Relative 1 %      Basophils Relative 0 %      Neutrophils Absolute 4.11 Thousands/µL      Immature Grans Absolute 0.01 Thousand/uL      Lymphocytes Absolute 1.11 Thousands/µL      Monocytes Absolute 0.29 Thousand/µL      Eosinophils Absolute 0.04 Thousand/µL      Basophils Absolute 0.02 Thousands/µL                  CT head without contrast   Final Result by Brittany Campos MD (07/17 1329)      No acute intracranial abnormality. Workstation performed: [id number]                    Procedures  Procedures   ECG 12 Lead Documentation  Date/Time: today/date: 7/19/2023  Performed by: Conception Oas    ECG reviewed by me, the ED Provider: yes    Patient location:  ED   Previous ECG:  Compared to current, no change   Rate:  80  ECG rate assessment: normal    Rhythm: sinus rhythm    Ectopy:  none    QRS axis:  Normal  Intervals: normal   Q waves: None   ST segments:  Normal  T waves: normal      Impression: Normal EKG        ED Course  ED Course as of 07/19/23 2329 Mon Jul 17, 2023   1333 CT head without contrast  No acute intracranial abnormality. 1445 hs TnI 0hr: 5  Pt without chest pain. No further trending needed   1445 Remainder of labs WNL   1451 Pt complaining of persistent headaches and total body numbness. Updated on labs and imaging results.   On review of records, pt has been seen several times for these symptoms - most recently her neurologist on 7/10 who recommended she follow up with her psychiatrist.      Discussed with patient that we will be unlikely to completely resolve her symptoms today, encouraging outpatient follow up with her PCP and psychiatrist (which is scheduled for August 1). 1508 Pt able to ambulate with her walker, will proceed with discharge home. Recommending follow up with PCP and pyschiatrist                               SBIRT 22yo+    Flowsheet Row Most Recent Value   Initial Alcohol Screen: US AUDIT-C     1. How often do you have a drink containing alcohol? 0 Filed at: 07/17/2023 1213   2. How many drinks containing alcohol do you have on a typical day you are drinking? 0 Filed at: 07/17/2023 1213   3b. FEMALE Any Age, or MALE 65+: How often do you have 4 or more drinks on one occassion? 0 Filed at: 07/17/2023 1213   Audit-C Score 0 Filed at: 07/17/2023 1213   CAMRON: How many times in the past year have you. .. Used an illegal drug or used a prescription medication for non-medical reasons? Never Filed at: 07/17/2023 1213                    Medical Decision Making  Amount and/or Complexity of Data Reviewed  Labs: ordered. Decision-making details documented in ED Course. Radiology: ordered. Decision-making details documented in ED Course. Risk  OTC drugs. Prescription drug management. Disposition  Final diagnoses:   Anxiety   Headache     Time reflects when diagnosis was documented in both MDM as applicable and the Disposition within this note     Time User Action Codes Description Comment    7/17/2023  3:09 PM Rahel Rutledge Add [F41.9] Anxiety     7/17/2023  3:09 PM Rahel Rutledge Add [R51.9] Headache       ED Disposition     ED Disposition   Discharge    Condition   Stable    Date/Time   Mon Jul 17, 2023  3:09 PM    Comment   Samantha Rodriguez discharge to home/self care.                Follow-up Information     Follow up With Specialties Details Why Contact Info    Psychiatrist  Schedule an appointment as soon as possible for a visit in 2 days For re-evaluation           Discharge Medication List as of 7/17/2023  3:10 PM      CONTINUE these medications which have NOT CHANGED    Details   amLODIPine (NORVASC) 10 mg tablet TAKE 1 TABLET BY MOUTH EVERY DAY, Normal      aspirin (ECOTRIN LOW STRENGTH) 81 mg EC tablet Take 1 tablet (81 mg total) by mouth daily, Starting Wed 10/12/2022, Normal      atorvastatin (LIPITOR) 40 mg tablet TAKE 1 TABLET BY MOUTH EVERY DAY, Normal      docusate sodium (COLACE) 100 mg capsule Take 1 capsule (100 mg total) by mouth every 12 (twelve) hours, Starting Thu 5/25/2023, Normal      ferrous sulfate 324 (65 Fe) mg Take 1 tablet (324 mg total) by mouth daily, Starting Thu 1/3/2736, Normal      folic acid (FOLVITE) 1 mg tablet TAKE 1 TABLET BY MOUTH EVERY DAY, Normal      hydrOXYzine HCL (ATARAX) 50 mg tablet Take 50 mg by mouth 3 (three) times a day as needed for itching, Historical Med      Ingrezza 40 MG CAPS Take 40 mg by mouth in the morning, Starting Mon 7/10/2023, Normal      LORazepam (ATIVAN) 0.5 mg tablet Starting Tue 4/25/2023, Historical Med      mirtazapine (REMERON) 7.5 MG tablet TAKE 1 TABLET BY MOUTH EVERY EVENING., Normal      pregabalin (LYRICA) 50 mg capsule 1 capsule twice a day.  May increase to 1 capsule 3 times per day if needed., Normal      QUEtiapine (SEROquel) 50 mg tablet TAKE 1 TAB (50 MG) BY MOUTH 3 TIMES A DAY. (9 AM, 4 PM, AND AT BEDTIME), Historical Med      acetaminophen (TYLENOL) 650 mg CR tablet Take 1 tablet (650 mg total) by mouth every 8 (eight) hours as needed for mild pain, Starting Fri 6/23/2023, Normal      benztropine (COGENTIN) 1 mg tablet Take 1 mg by mouth 2 (two) times a day, Starting Tue 4/12/2022, Historical Med      busPIRone (BUSPAR) 10 mg tablet Take 20 mg by mouth 3 (three) times a day , Starting Mon 11/8/2021, Historical Med      lidocaine (LMX) 4 % cream Apply topically as needed for mild pain, Starting Fri 6/23/2023, Normal      lithium carbonate (LITHOBID) 300 mg CR tablet TAKE 1 TABLET BY MOUTH DAILY AT BEDTIME, Normal      Multiple Vitamin (multivitamin) tablet Take 1 tablet by mouth daily, Starting Thu 6/2/2022, Normal      pantoprazole (PROTONIX) 20 mg tablet Take 20 mg by mouth daily, Historical Med      PARoxetine (PAXIL) 30 mg tablet Take 60 mg by mouth 2 (two) times a day. Takes two (60 mg) tabs by mouth nightly   Indications: Social Anxiety Disorder, Historical Med      polyethylene glycol (MIRALAX) 17 g packet Take 17 g by mouth daily, Starting Thu 5/25/2023, Normal      prazosin (MINIPRESS) 2 mg capsule Take 2 mg by mouth daily at bedtime. Indications: Frightening Dreams, Historical Med      acetaminophen (Acetaminophen Extra Strength) 500 mg tablet Take 1 tablet (500 mg total) by mouth every 6 (six) hours as needed for mild pain, Starting Fri 2/17/2023, Normal             No discharge procedures on file.     PDMP Review       Value Time User    PDMP Reviewed  Yes 7/19/2023 12:22 AM Wellington Novoa PA-C          ED Provider  Electronically Signed by           Evaristo Benavidez DO  07/19/23 7936

## 2023-07-18 ENCOUNTER — APPOINTMENT (EMERGENCY)
Dept: CT IMAGING | Facility: HOSPITAL | Age: 58
DRG: 092 | End: 2023-07-18
Payer: MEDICARE

## 2023-07-18 ENCOUNTER — HOSPITAL ENCOUNTER (INPATIENT)
Facility: HOSPITAL | Age: 58
LOS: 1 days | Discharge: HOME | DRG: 092 | End: 2023-07-20
Attending: EMERGENCY MEDICINE | Admitting: INTERNAL MEDICINE
Payer: MEDICARE

## 2023-07-18 ENCOUNTER — APPOINTMENT (EMERGENCY)
Dept: RADIOLOGY | Facility: HOSPITAL | Age: 58
DRG: 092 | End: 2023-07-18
Payer: MEDICARE

## 2023-07-18 DIAGNOSIS — M62.838 MUSCLE SPASM: ICD-10-CM

## 2023-07-18 DIAGNOSIS — R29.90 STROKE-LIKE SYMPTOMS: Primary | ICD-10-CM

## 2023-07-18 DIAGNOSIS — Z00.00 MEDICARE ANNUAL WELLNESS VISIT, INITIAL: ICD-10-CM

## 2023-07-18 DIAGNOSIS — F31.60 BIPOLAR AFFECTIVE DISORDER, CURRENT EPISODE MIXED, CURRENT EPISODE SEVERITY UNSPECIFIED (HCC): ICD-10-CM

## 2023-07-18 DIAGNOSIS — F41.0 PANIC DISORDER WITHOUT AGORAPHOBIA: ICD-10-CM

## 2023-07-18 DIAGNOSIS — F33.1 MAJOR DEPRESSIVE DISORDER, RECURRENT EPISODE, MODERATE DEGREE (HCC): ICD-10-CM

## 2023-07-18 LAB
ANION GAP SERPL CALCULATED.3IONS-SCNC: 12 MMOL/L
APTT PPP: 26 SECONDS (ref 23–37)
ATRIAL RATE: 78 BPM
BUN SERPL-MCNC: 5 MG/DL (ref 5–25)
CALCIUM SERPL-MCNC: 8.8 MG/DL (ref 8.4–10.2)
CARDIAC TROPONIN I PNL SERPL HS: 2 NG/L
CHLORIDE SERPL-SCNC: 109 MMOL/L (ref 96–108)
CO2 SERPL-SCNC: 21 MMOL/L (ref 21–32)
CREAT SERPL-MCNC: 0.88 MG/DL (ref 0.6–1.3)
ERYTHROCYTE [DISTWIDTH] IN BLOOD BY AUTOMATED COUNT: 13.9 % (ref 11.6–15.1)
FLUAV RNA RESP QL NAA+PROBE: NEGATIVE
FLUBV RNA RESP QL NAA+PROBE: NEGATIVE
GFR SERPL CREATININE-BSD FRML MDRD: 72 ML/MIN/1.73SQ M
GLUCOSE SERPL-MCNC: 140 MG/DL (ref 65–140)
GLUCOSE SERPL-MCNC: 146 MG/DL (ref 65–140)
GLUCOSE SERPL-MCNC: 99 MG/DL (ref 65–140)
HCT VFR BLD AUTO: 41.9 % (ref 34.8–46.1)
HGB BLD-MCNC: 14 G/DL (ref 11.5–15.4)
INR PPP: 0.95 (ref 0.84–1.19)
MCH RBC QN AUTO: 29.5 PG (ref 26.8–34.3)
MCHC RBC AUTO-ENTMCNC: 33.4 G/DL (ref 31.4–37.4)
MCV RBC AUTO: 88 FL (ref 82–98)
P AXIS: 76 DEGREES
PLATELET # BLD AUTO: 261 THOUSANDS/UL (ref 149–390)
PMV BLD AUTO: 9.5 FL (ref 8.9–12.7)
POTASSIUM SERPL-SCNC: 3.1 MMOL/L (ref 3.5–5.3)
PR INTERVAL: 144 MS
PROTHROMBIN TIME: 12.6 SECONDS (ref 11.6–14.5)
QRS AXIS: 105 DEGREES
QRSD INTERVAL: 94 MS
QT INTERVAL: 400 MS
QTC INTERVAL: 456 MS
RBC # BLD AUTO: 4.75 MILLION/UL (ref 3.81–5.12)
RSV RNA RESP QL NAA+PROBE: NEGATIVE
SARS-COV-2 RNA RESP QL NAA+PROBE: NEGATIVE
SODIUM SERPL-SCNC: 142 MMOL/L (ref 135–147)
T WAVE AXIS: 29 DEGREES
VENTRICULAR RATE: 78 BPM
WBC # BLD AUTO: 3.84 THOUSAND/UL (ref 4.31–10.16)

## 2023-07-18 PROCEDURE — 82948 REAGENT STRIP/BLOOD GLUCOSE: CPT

## 2023-07-18 PROCEDURE — 36415 COLL VENOUS BLD VENIPUNCTURE: CPT

## 2023-07-18 PROCEDURE — 99291 CRITICAL CARE FIRST HOUR: CPT | Performed by: EMERGENCY MEDICINE

## 2023-07-18 PROCEDURE — 99285 EMERGENCY DEPT VISIT HI MDM: CPT

## 2023-07-18 PROCEDURE — 0241U HB NFCT DS VIR RESP RNA 4 TRGT: CPT

## 2023-07-18 PROCEDURE — 96374 THER/PROPH/DIAG INJ IV PUSH: CPT

## 2023-07-18 PROCEDURE — 85027 COMPLETE CBC AUTOMATED: CPT

## 2023-07-18 PROCEDURE — 70498 CT ANGIOGRAPHY NECK: CPT

## 2023-07-18 PROCEDURE — 93005 ELECTROCARDIOGRAM TRACING: CPT

## 2023-07-18 PROCEDURE — 70496 CT ANGIOGRAPHY HEAD: CPT

## 2023-07-18 PROCEDURE — 71045 X-RAY EXAM CHEST 1 VIEW: CPT

## 2023-07-18 PROCEDURE — 85730 THROMBOPLASTIN TIME PARTIAL: CPT

## 2023-07-18 PROCEDURE — 84484 ASSAY OF TROPONIN QUANT: CPT

## 2023-07-18 PROCEDURE — 85610 PROTHROMBIN TIME: CPT

## 2023-07-18 PROCEDURE — 80048 BASIC METABOLIC PNL TOTAL CA: CPT

## 2023-07-18 RX ORDER — LORAZEPAM 2 MG/ML
1 INJECTION INTRAMUSCULAR ONCE
Status: COMPLETED | OUTPATIENT
Start: 2023-07-18 | End: 2023-07-18

## 2023-07-18 RX ORDER — ASPIRIN 325 MG
325 TABLET ORAL ONCE
Status: COMPLETED | OUTPATIENT
Start: 2023-07-18 | End: 2023-07-18

## 2023-07-18 RX ORDER — CLOPIDOGREL BISULFATE 75 MG/1
300 TABLET ORAL ONCE
Status: COMPLETED | OUTPATIENT
Start: 2023-07-18 | End: 2023-07-18

## 2023-07-18 RX ADMIN — IOHEXOL 100 ML: 350 INJECTION, SOLUTION INTRAVENOUS at 22:26

## 2023-07-18 RX ADMIN — CLOPIDOGREL BISULFATE 300 MG: 75 TABLET ORAL at 23:40

## 2023-07-18 RX ADMIN — ASPIRIN 325 MG ORAL TABLET 325 MG: 325 PILL ORAL at 23:41

## 2023-07-18 RX ADMIN — LORAZEPAM 1 MG: 2 INJECTION INTRAMUSCULAR; INTRAVENOUS at 23:04

## 2023-07-19 ENCOUNTER — PATIENT OUTREACH (OUTPATIENT)
Dept: INTERNAL MEDICINE CLINIC | Facility: CLINIC | Age: 58
End: 2023-07-19

## 2023-07-19 ENCOUNTER — APPOINTMENT (OUTPATIENT)
Dept: NON INVASIVE DIAGNOSTICS | Facility: HOSPITAL | Age: 58
DRG: 092 | End: 2023-07-19
Payer: MEDICARE

## 2023-07-19 PROBLEM — R29.90 STROKE-LIKE EPISODE: Status: ACTIVE | Noted: 2023-07-19

## 2023-07-19 PROBLEM — K56.609 SMALL BOWEL OBSTRUCTION (HCC): Status: RESOLVED | Noted: 2023-03-24 | Resolved: 2023-07-19

## 2023-07-19 LAB
2HR DELTA HS TROPONIN: <0 NG/L
ANION GAP SERPL CALCULATED.3IONS-SCNC: 7 MMOL/L
AORTIC ROOT: 2.8 CM
AORTIC VALVE MEAN VELOCITY: 9.4 M/S
APICAL FOUR CHAMBER EJECTION FRACTION: 61 %
ASCENDING AORTA: 3.2 CM
ATRIAL RATE: 72 BPM
ATRIAL RATE: 88 BPM
AV AREA BY CONTINUOUS VTI: 2.7 CM2
AV AREA PEAK VELOCITY: 2.2 CM2
AV LVOT MEAN GRADIENT: 2 MMHG
AV LVOT PEAK GRADIENT: 4 MMHG
AV MEAN GRADIENT: 4 MMHG
AV PEAK GRADIENT: 8 MMHG
AV VALVE AREA: 2.7 CM2
AV VELOCITY RATIO: 0.7
BUN SERPL-MCNC: 5 MG/DL (ref 5–25)
CALCIUM SERPL-MCNC: 8.4 MG/DL (ref 8.4–10.2)
CARDIAC TROPONIN I PNL SERPL HS: <2 NG/L
CHLORIDE SERPL-SCNC: 107 MMOL/L (ref 96–108)
CHOLEST SERPL-MCNC: 116 MG/DL
CO2 SERPL-SCNC: 27 MMOL/L (ref 21–32)
CREAT SERPL-MCNC: 0.7 MG/DL (ref 0.6–1.3)
DOP CALC AO PEAK VEL: 1.45 M/S
DOP CALC AO VTI: 26.85 CM
DOP CALC LVOT AREA: 3.14 CM2
DOP CALC LVOT CARDIAC INDEX: 2.86 L/MIN/M2
DOP CALC LVOT CARDIAC OUTPUT: 5.04 L/MIN
DOP CALC LVOT DIAMETER: 2 CM
DOP CALC LVOT PEAK VEL VTI: 23.05 CM
DOP CALC LVOT PEAK VEL: 1.02 M/S
DOP CALC LVOT STROKE INDEX: 40.9 ML/M2
DOP CALC LVOT STROKE VOLUME: 72.38
E WAVE DECELERATION TIME: 162 MS
ERYTHROCYTE [DISTWIDTH] IN BLOOD BY AUTOMATED COUNT: 14.1 % (ref 11.6–15.1)
EST. AVERAGE GLUCOSE BLD GHB EST-MCNC: 82 MG/DL
FRACTIONAL SHORTENING: 35 (ref 28–44)
GFR SERPL CREATININE-BSD FRML MDRD: 95 ML/MIN/1.73SQ M
GLUCOSE P FAST SERPL-MCNC: 82 MG/DL (ref 65–99)
GLUCOSE SERPL-MCNC: 82 MG/DL (ref 65–140)
HBA1C MFR BLD: 4.5 %
HCT VFR BLD AUTO: 40.5 % (ref 34.8–46.1)
HDLC SERPL-MCNC: 44 MG/DL
HGB BLD-MCNC: 13.6 G/DL (ref 11.5–15.4)
INTERVENTRICULAR SEPTUM IN DIASTOLE (PARASTERNAL SHORT AXIS VIEW): 0.6 CM
INTERVENTRICULAR SEPTUM: 0.6 CM (ref 0.6–1.1)
LAAS-AP2: 15.2 CM2
LAAS-AP4: 15.9 CM2
LDLC SERPL CALC-MCNC: 56 MG/DL (ref 0–100)
LEFT ATRIUM SIZE: 3.6 CM
LEFT ATRIUM VOLUME (MOD BIPLANE): 45 ML
LEFT INTERNAL DIMENSION IN SYSTOLE: 3.2 CM (ref 2.1–4)
LEFT VENTRICULAR INTERNAL DIMENSION IN DIASTOLE: 4.9 CM (ref 3.5–6)
LEFT VENTRICULAR POSTERIOR WALL IN END DIASTOLE: 0.8 CM
LEFT VENTRICULAR STROKE VOLUME: 71 ML
LVSV (TEICH): 71 ML
MCH RBC QN AUTO: 29.7 PG (ref 26.8–34.3)
MCHC RBC AUTO-ENTMCNC: 33.6 G/DL (ref 31.4–37.4)
MCV RBC AUTO: 88 FL (ref 82–98)
MV E'TISSUE VEL-SEP: 8 CM/S
MV PEAK A VEL: 0.79 M/S
MV PEAK E VEL: 80 CM/S
MV STENOSIS PRESSURE HALF TIME: 47 MS
MV VALVE AREA P 1/2 METHOD: 4.68
P AXIS: 65 DEGREES
P AXIS: 76 DEGREES
PLATELET # BLD AUTO: 261 THOUSANDS/UL (ref 149–390)
PMV BLD AUTO: 9.6 FL (ref 8.9–12.7)
POTASSIUM SERPL-SCNC: 3.1 MMOL/L (ref 3.5–5.3)
PR INTERVAL: 132 MS
PR INTERVAL: 150 MS
QRS AXIS: 108 DEGREES
QRS AXIS: 79 DEGREES
QRSD INTERVAL: 100 MS
QRSD INTERVAL: 92 MS
QT INTERVAL: 382 MS
QT INTERVAL: 400 MS
QTC INTERVAL: 438 MS
QTC INTERVAL: 462 MS
RA PRESSURE ESTIMATED: 3 MMHG
RBC # BLD AUTO: 4.58 MILLION/UL (ref 3.81–5.12)
RIGHT ATRIUM AREA SYSTOLE A4C: 13.1 CM2
RIGHT VENTRICLE ID DIMENSION: 3.2 CM
SL CV LEFT ATRIUM LENGTH A2C: 4.2 CM
SL CV LV EF: 60
SL CV PED ECHO LEFT VENTRICLE DIASTOLIC VOLUME (MOD BIPLANE) 2D: 113 ML
SL CV PED ECHO LEFT VENTRICLE SYSTOLIC VOLUME (MOD BIPLANE) 2D: 42 ML
SODIUM SERPL-SCNC: 141 MMOL/L (ref 135–147)
T WAVE AXIS: 16 DEGREES
T WAVE AXIS: 17 DEGREES
TRICUSPID ANNULAR PLANE SYSTOLIC EXCURSION: 2.3 CM
TRIGL SERPL-MCNC: 78 MG/DL
VENTRICULAR RATE: 72 BPM
VENTRICULAR RATE: 88 BPM
WBC # BLD AUTO: 4.79 THOUSAND/UL (ref 4.31–10.16)

## 2023-07-19 PROCEDURE — 85027 COMPLETE CBC AUTOMATED: CPT

## 2023-07-19 PROCEDURE — 93306 TTE W/DOPPLER COMPLETE: CPT

## 2023-07-19 PROCEDURE — 93010 ELECTROCARDIOGRAM REPORT: CPT | Performed by: INTERNAL MEDICINE

## 2023-07-19 PROCEDURE — 99215 OFFICE O/P EST HI 40 MIN: CPT | Performed by: STUDENT IN AN ORGANIZED HEALTH CARE EDUCATION/TRAINING PROGRAM

## 2023-07-19 PROCEDURE — 83036 HEMOGLOBIN GLYCOSYLATED A1C: CPT

## 2023-07-19 PROCEDURE — 36415 COLL VENOUS BLD VENIPUNCTURE: CPT

## 2023-07-19 PROCEDURE — 99222 1ST HOSP IP/OBS MODERATE 55: CPT | Performed by: PSYCHIATRY & NEUROLOGY

## 2023-07-19 PROCEDURE — 93005 ELECTROCARDIOGRAM TRACING: CPT

## 2023-07-19 PROCEDURE — 84484 ASSAY OF TROPONIN QUANT: CPT

## 2023-07-19 PROCEDURE — 99223 1ST HOSP IP/OBS HIGH 75: CPT

## 2023-07-19 PROCEDURE — 80048 BASIC METABOLIC PNL TOTAL CA: CPT

## 2023-07-19 PROCEDURE — 80061 LIPID PANEL: CPT

## 2023-07-19 RX ORDER — ASPIRIN 81 MG/1
81 TABLET, CHEWABLE ORAL DAILY
Status: DISCONTINUED | OUTPATIENT
Start: 2023-07-19 | End: 2023-07-19 | Stop reason: SDUPTHER

## 2023-07-19 RX ORDER — QUETIAPINE FUMARATE 25 MG/1
50 TABLET, FILM COATED ORAL 3 TIMES DAILY
Status: DISCONTINUED | OUTPATIENT
Start: 2023-07-19 | End: 2023-07-19

## 2023-07-19 RX ORDER — PREGABALIN 50 MG/1
50 CAPSULE ORAL DAILY
Status: DISCONTINUED | OUTPATIENT
Start: 2023-07-19 | End: 2023-07-20 | Stop reason: HOSPADM

## 2023-07-19 RX ORDER — CLONAZEPAM 0.5 MG/1
0.5 TABLET ORAL 2 TIMES DAILY
Status: DISCONTINUED | OUTPATIENT
Start: 2023-07-19 | End: 2023-07-20 | Stop reason: HOSPADM

## 2023-07-19 RX ORDER — MIRTAZAPINE 15 MG/1
7.5 TABLET, FILM COATED ORAL EVERY EVENING
Status: DISCONTINUED | OUTPATIENT
Start: 2023-07-19 | End: 2023-07-20 | Stop reason: HOSPADM

## 2023-07-19 RX ORDER — AMLODIPINE BESYLATE 10 MG/1
10 TABLET ORAL DAILY
Status: DISCONTINUED | OUTPATIENT
Start: 2023-07-19 | End: 2023-07-19

## 2023-07-19 RX ORDER — ASPIRIN 81 MG/1
81 TABLET, CHEWABLE ORAL DAILY
Status: DISCONTINUED | OUTPATIENT
Start: 2023-07-19 | End: 2023-07-20 | Stop reason: HOSPADM

## 2023-07-19 RX ORDER — MAGNESIUM HYDROXIDE/ALUMINUM HYDROXICE/SIMETHICONE 120; 1200; 1200 MG/30ML; MG/30ML; MG/30ML
30 SUSPENSION ORAL EVERY 6 HOURS PRN
Status: DISCONTINUED | OUTPATIENT
Start: 2023-07-19 | End: 2023-07-20 | Stop reason: HOSPADM

## 2023-07-19 RX ORDER — DOCUSATE SODIUM 100 MG/1
100 CAPSULE, LIQUID FILLED ORAL EVERY 12 HOURS SCHEDULED
Status: DISCONTINUED | OUTPATIENT
Start: 2023-07-19 | End: 2023-07-20 | Stop reason: HOSPADM

## 2023-07-19 RX ORDER — ATORVASTATIN CALCIUM 40 MG/1
40 TABLET, FILM COATED ORAL DAILY
Status: DISCONTINUED | OUTPATIENT
Start: 2023-07-19 | End: 2023-07-19

## 2023-07-19 RX ORDER — QUETIAPINE FUMARATE 25 MG/1
50 TABLET, FILM COATED ORAL
Status: DISCONTINUED | OUTPATIENT
Start: 2023-07-19 | End: 2023-07-20 | Stop reason: HOSPADM

## 2023-07-19 RX ORDER — ATORVASTATIN CALCIUM 40 MG/1
40 TABLET, FILM COATED ORAL EVERY EVENING
Status: DISCONTINUED | OUTPATIENT
Start: 2023-07-19 | End: 2023-07-19 | Stop reason: SDUPTHER

## 2023-07-19 RX ORDER — HYDROXYZINE HYDROCHLORIDE 25 MG/1
50 TABLET, FILM COATED ORAL 3 TIMES DAILY PRN
Status: DISCONTINUED | OUTPATIENT
Start: 2023-07-19 | End: 2023-07-19

## 2023-07-19 RX ORDER — ONDANSETRON 2 MG/ML
4 INJECTION INTRAMUSCULAR; INTRAVENOUS EVERY 6 HOURS PRN
Status: DISCONTINUED | OUTPATIENT
Start: 2023-07-19 | End: 2023-07-20 | Stop reason: HOSPADM

## 2023-07-19 RX ORDER — LORAZEPAM 1 MG/1
1 TABLET ORAL EVERY 6 HOURS PRN
Status: DISCONTINUED | OUTPATIENT
Start: 2023-07-19 | End: 2023-07-20 | Stop reason: HOSPADM

## 2023-07-19 RX ORDER — LORAZEPAM 0.5 MG/1
0.5 TABLET ORAL 2 TIMES DAILY
Status: DISCONTINUED | OUTPATIENT
Start: 2023-07-19 | End: 2023-07-19

## 2023-07-19 RX ORDER — ATORVASTATIN CALCIUM 40 MG/1
40 TABLET, FILM COATED ORAL
Status: DISCONTINUED | OUTPATIENT
Start: 2023-07-19 | End: 2023-07-20 | Stop reason: HOSPADM

## 2023-07-19 RX ORDER — POLYETHYLENE GLYCOL 3350 17 G/17G
17 POWDER, FOR SOLUTION ORAL DAILY
Status: DISCONTINUED | OUTPATIENT
Start: 2023-07-19 | End: 2023-07-20 | Stop reason: HOSPADM

## 2023-07-19 RX ORDER — ACETAMINOPHEN 325 MG/1
650 TABLET ORAL EVERY 4 HOURS PRN
Status: DISCONTINUED | OUTPATIENT
Start: 2023-07-19 | End: 2023-07-20 | Stop reason: HOSPADM

## 2023-07-19 RX ORDER — FOLIC ACID 1 MG/1
1000 TABLET ORAL DAILY
Status: DISCONTINUED | OUTPATIENT
Start: 2023-07-19 | End: 2023-07-20 | Stop reason: HOSPADM

## 2023-07-19 RX ADMIN — CLONAZEPAM 0.5 MG: 0.5 TABLET ORAL at 17:31

## 2023-07-19 RX ADMIN — DOCUSATE SODIUM 100 MG: 100 CAPSULE, LIQUID FILLED ORAL at 08:25

## 2023-07-19 RX ADMIN — FOLIC ACID 1000 MCG: 1 TABLET ORAL at 08:16

## 2023-07-19 RX ADMIN — HYDROXYZINE HYDROCHLORIDE 50 MG: 25 TABLET, FILM COATED ORAL at 10:53

## 2023-07-19 RX ADMIN — QUETIAPINE FUMARATE 50 MG: 25 TABLET ORAL at 16:03

## 2023-07-19 RX ADMIN — DOCUSATE SODIUM 100 MG: 100 CAPSULE, LIQUID FILLED ORAL at 01:54

## 2023-07-19 RX ADMIN — QUETIAPINE FUMARATE 50 MG: 25 TABLET ORAL at 12:10

## 2023-07-19 RX ADMIN — ATORVASTATIN CALCIUM 40 MG: 40 TABLET, FILM COATED ORAL at 16:03

## 2023-07-19 RX ADMIN — VALBENAZINE 40 MG: 40 CAPSULE ORAL at 08:22

## 2023-07-19 RX ADMIN — ACETAMINOPHEN 325MG 650 MG: 325 TABLET ORAL at 05:51

## 2023-07-19 RX ADMIN — CLONAZEPAM 0.5 MG: 0.5 TABLET ORAL at 12:10

## 2023-07-19 RX ADMIN — MIRTAZAPINE 7.5 MG: 15 TABLET, FILM COATED ORAL at 17:32

## 2023-07-19 RX ADMIN — DOCUSATE SODIUM 100 MG: 100 CAPSULE, LIQUID FILLED ORAL at 20:18

## 2023-07-19 RX ADMIN — ASPIRIN 81 MG 81 MG: 81 TABLET ORAL at 14:19

## 2023-07-19 RX ADMIN — PREGABALIN 50 MG: 50 CAPSULE ORAL at 08:22

## 2023-07-19 RX ADMIN — ACETAMINOPHEN 325MG 650 MG: 325 TABLET ORAL at 20:18

## 2023-07-19 RX ADMIN — QUETIAPINE FUMARATE 50 MG: 25 TABLET ORAL at 08:16

## 2023-07-19 RX ADMIN — POLYETHYLENE GLYCOL 3350 17 G: 17 POWDER, FOR SOLUTION ORAL at 08:16

## 2023-07-19 RX ADMIN — LORAZEPAM 0.5 MG: 0.5 TABLET ORAL at 08:16

## 2023-07-19 RX ADMIN — QUETIAPINE FUMARATE 50 MG: 25 TABLET ORAL at 21:09

## 2023-07-19 NOTE — ASSESSMENT & PLAN NOTE
Presents tonight for reported R sided facial droop, L hemibody decreased sensation and slurred speech. On admission after receiving ativan her symptoms reportedly improved. She remained somewhat dysarthric but also did not have dentures.  She was very anxious, tearful and tangential in speech    Plan:  • Stroke alert called with CTH/CTA without evidence of acute intracranial abnormalities, neuro recommending stroke pathway  • Continue stroke pathway  • Maintain telemetry   • Check a1c, lipids   • Consults: neurology/psychiatry

## 2023-07-19 NOTE — QUICK NOTE
Notified of stroke alert at 10:13 PM. Response immediate. Pt is a 60 yo F PMHx bipolar dx, 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, previous stroke, cognitive impairment/ history of encephalopathy likely related to polypharmacy, MIMI not on CPAP, OA with gait dysfunction/chronic pain, DDD s/p lumbar/cervical fusion who presents for evaluation of acute/subacute neurologic symptoms. EMS reported R sided facial droop for a few days, and pt reports L hemibody decreased sensation and slurred speech which started at 5 pm today. Initial NIHSS 2 for dysarthria and R sided sensory deficits, no evidence of facial droop. Reportedly, she presented to an outside ED yesterday for anxiety and R sided decreased sensation which has since resolved. CTH showed no evidence of acute intracranial abnormalities. CTA showed no evidence of LVO. Not a candidate for TNK as she presented outside of the window. Recommend admission stroke pathway, MRI brain w/o, continue ASA, start plavix, continue statin, A1c, lipid panel, PT/OT/SLP.

## 2023-07-19 NOTE — PROGRESS NOTES
Progress Note - Samantha Aranda 59 y.o. female MRN: [id number]    Unit/Bed#: E4 -01 Encounter: [id number]      Subjective: The patient is feeling pretty well right at the moment. She says that she has been having worsening panic attacks of late. She had a particularly severe episode yesterday. She felt like she was out of control. Her whole body got numb. Physical Exam:   Temp:  [97.1 °F (36.2 °C)-98.5 °F (36.9 °C)] 97.1 °F (36.2 °C)  HR:  [51-95] 72  Resp:  [16-51] 18  BP: (105-177)/() 150/99    Gen: Well-developed, well-nourished, in no distress. Neck: Supple. No lymphadenopathy, goiter, or bruit. Heart: Regular rhythm. I heard no murmur, gallop, or rub. Lungs: Clear to auscultation and percussion. No wheezing, rales, or rhonchi. Abd: Soft with active bowel sounds. No mass, tenderness, organomegaly. Extremities: No clubbing, cyanosis, or edema. No calf tenderness. Neuro: Alert and oriented. Anxious. No focal sign. Skin: Warm and dry. LABS:   CBC:   Lab Results   Component Value Date    WBC 4.79 07/19/2023    HGB 13.6 07/19/2023    HCT 40.5 07/19/2023    MCV 88 07/19/2023     07/19/2023    RBC 4.58 07/19/2023    MCH 29.7 07/19/2023    MCHC 33.6 07/19/2023    RDW 14.1 07/19/2023    MPV 9.6 07/19/2023   , CMP:   Lab Results   Component Value Date    SODIUM 141 07/19/2023    K 3.1 (L) 07/19/2023     07/19/2023    CO2 27 07/19/2023    BUN 5 07/19/2023    CREATININE 0.70 07/19/2023    CALCIUM 8.4 07/19/2023    EGFR 95 07/19/2023           Assessment/Plan:  1. Panic disorder, not well controlled  2. Hypertension  3. Generalized numbness    Neurology input is greatly appreciated. I agree that the presenting symptoms are unlikely to represent cerebral ischemia and are much more likely to be psychiatric in origin. Psychiatry help is greatly appreciated and the patient's medications will be adjusted as recommended.   I do not think that the patient is suitable for discharge at the moment. Given the severity of her symptoms, I believe that it is more appropriate to monitor her in the hospital until we can  the effectiveness of her medication adjustments.     VTE Pharmacologic Prophylaxis: Reason for no pharmacologic prophylaxis Low risk  VTE Mechanical Prophylaxis: reason for no mechanical VTE prophylaxis Low risk

## 2023-07-19 NOTE — CASE MANAGEMENT
Case Management Assessment & Discharge Planning Note    Patient name [employer]  Location [address]  269 [id number]-* MRN [id number]  : 1965 Date 2023       Current Admission Date: 2023  Current Admission Diagnosis:Stroke-like episode   Patient Active Problem List    Diagnosis Date Noted   • Stroke-like episode 2023   • Witnessed seizure-like activity ([address]) 2023   • Fall 2023   • Memory loss 2023   • Hemiplegia, post-stroke ([address]) 2022   • Anemia 2022   • Hypokalemia 2022   • Constipation 03/15/2022   • CVA (cerebral vascular accident) ([address]) 2022   • Mixed hyperlipidemia 2021   • History of CVA (cerebrovascular accident) 2021   • Encephalopathy 2021   • Dysphagia 2020   • Ambulatory dysfunction 2020   • Status post insertion of spinal cord stimulator 2020   • Tardive dyskinesia 2020   • MIMI (obstructive sleep apnea)    • Post laminectomy syndrome 10/07/2019   • Spinal stenosis of lumbar region with neurogenic claudication 2018   • Lumbar radiculopathy 2017   • Chronic pain disorder 2017   • Lumbar spondylosis 10/31/2016   • Generalized anxiety disorder 10/26/2016   • Major depressive disorder, recurrent episode, moderate degree ([address]) 2016   • Bipolar disorder ([address]) 2015   • Panic disorder without agoraphobia 2015   • Post traumatic stress disorder 2015   • Tremor 2014   • Migraine 2014   • Esophageal reflux 2014   • Cognitive disorder 2014   • Overactive bladder 2013   • Insomnia 2013   • Urinary incontinence 2012   • Vitamin D deficiency 2012   • Fibromyalgia 2012   • Essential hypertension 2012      LOS (days): 0  Geometric Mean LOS (GMLOS) (days):   Days to GMLOS:     OBJECTIVE:              Current admission status: Inpatient       Preferred Pharmacy:   [address] [place] [address] SHAKILA HUMPHREY - Dao [address]  [address] [facility],6Th Floor 630 [facility]  Phone: [phone] Fax: [address] [address]  Phone: [phone] Fax: 1000 [facility], [address] [facility]  [address]  [facility]  TH[address] [address]  Phone: [phone] Fax: [phone]    [place] [address] - [facility]  [facility] [address]  Phone: [phone] Fax: [address], [address]  [address]  [address] [address]  Phone: [phone] Fax: [phone] [address] [place] [address] [address] [facility],  [address] [facility],  1798 [facility] 1515 [facility]  Phone: [phone] Fax: [phone]    Primary Care Provider: Chano Dominguez DO    Primary Insurance: [id number] [facility] [place]  Secondary Insurance: Maryann Fee    ASSESSMENT:  Nehal Marsh 13, [address] [facility] Representative - Daughter   Primary Phone: [phone] (Mobile)  Home Phone: [phone]               Readmission Root Cause  30 Day Readmission: No    Patient Information  Admitted from[de-identified] Home  Mental Status: Alert ("Cognitive disorder")  During Assessment patient was accompanied by: Daughter  Assessment information provided by[de-identified] Daughter  Primary Caregiver: Family  Caregiver's Name[de-identified] Dtr and CLARENCE  Caregiver's Relationship to Patient[de-identified] Family Member  Support Systems: Daughter, Family members  [facility]: [facility] do you live in?: [address] entry access options.  Select all that apply.: Stairs  Number of steps to enter home.: 2  Do the steps have railings?: No  Type of Current Residence: [address] [address]  Upon entering residence, is there a bedroom on the main floor (no further steps)?: No  A bedroom is located on the following floor levels of residence (select all that apply):: 2nd Floor  Upon entering residence, is there a bathroom on the main floor (no further steps)?: Yes  Number of steps to 2nd floor from main floor: One Flight  In the last 12 months, was there a time when you were not able to pay the mortgage or rent on time?: No  In the last 12 months, how many places have you lived?: 2  In the last 12 months, was there a time when you did not have a steady place to sleep or slept in a shelter (including now)?: No  Homeless/housing insecurity resource given?: N/A  Living Arrangements: Lives w/ Daughter, Lives w/ Extended Family (Dtr, CLARENCE, 5 grandchildren)    Activities of Daily Living Prior to Admission  Functional Status: Assistance  Completes ADLs independently?: No  Level of ADL dependence: Assistance  Ambulates independently?: No  Level of ambulatory dependence: Assistance  Does patient use assisted devices?: Yes  Assisted Devices (DME) used: Rollator, Bedside Commode, Shower Chair  Does patient currently own DME?: Yes  What DME does the patient currently own?: Queta Wade, Rollator, Shower Chair, Bedside Commode, Wheelchair  Does patient have a history of Outpatient Therapy (PT/OT)?: No  Does the patient have a history of Short-Term Rehab?: Yes ([facility], [facility], others)  Does patient have a history of HHC?: Yes ([facility])  Does patient currently have [facility]?: No    Patient Information Continued  Does patient have prescription coverage?: Yes  Within the past 12 months, you worried that your food would run out before you got the money to buy more.: Never true  Within the past 12 months, the food you bought just didn't last and you didn't have money to get more.: Never true  Food insecurity resource given?: N/A  Does patient receive dialysis treatments?: No  Does patient have a history of substance abuse?: No  Does patient have a history of Mental Health Diagnosis?: Yes (MDD, PTSD, Agoraphobia)  Is patient receiving treatment for mental health?: Yes (Sees psych for med management)  Has patient received inpatient treatment related to mental health in the last 2 years?: Yes (2021 at [facility])    Means of [employer] of Transport to Appts[de-identified] Family transport  In the past 12 months, has lack of transportation kept you from medical appointments or from getting medications?: No  In the past 12 months, has lack of transportation kept you from meetings, work, or from getting things needed for daily living?: No  Was application for public transport provided?: N/A    DISCHARGE DETAILS:    Discharge planning discussed with[de-identified] Pt's daughter  Freedom of Choice: Yes  Comments - Freedom of Choice: Pt's daughter confirmed that family are caregivers at baseline. No CM concerns or needs identified at this time. CM contacted family/caregiver?: Yes  Were Treatment Team discharge recommendations reviewed with patient/caregiver?: Yes     Were patient/caregiver advised of the risks associated with not following Treatment Team discharge recommendations?: Yes    Contacts  Patient Contacts: Reji Rodriguez/Daughter  Relationship to Patient[de-identified] Family  Contact Method: Phone  Phone Number: [phone]  Reason/Outcome: Continuity of Care, Emergency Contact, Discharge [facility]         Is the patient interested in [address]  [facility] at discharge?: No    DME Referral Provided  Referral made for DME?: No    Other Referral/Resources/Interventions Provided:  Interventions: None Indicated    Treatment Team Recommendation: Home  Discharge Destination Plan[de-identified] Home  Transport at Discharge : Family     Additional Comments: Patient reviewed during care coordination rounds with Dr. Ok Escalera who informed that pt was cleared by psych and did not have a stroke. Psych did recommend medication changes.

## 2023-07-19 NOTE — PLAN OF CARE
Problem: Potential for Falls  Goal: Patient will remain free of falls  Description: INTERVENTIONS:  - Educate patient/family on patient safety including physical limitations  - Instruct patient to call for assistance with activity   - Consult OT/PT to assist with strengthening/mobility   - Keep Call bell within reach  - Keep bed low and locked with side rails adjusted as appropriate  - Keep care items and personal belongings within reach  - Initiate and maintain comfort rounds  - Make Fall Risk Sign visible to staff  - Offer Toileting every 2 Hours, in advance of need  - Initiate/Maintain bed alarm  - Obtain necessary fall risk management equipment:   - Apply yellow socks and bracelet for high fall risk patients  - Consider moving patient to room near nurses station  Outcome: Progressing     Problem: MOBILITY - ADULT  Goal: Maintain or return to baseline ADL function  Description: INTERVENTIONS:  -  Assess patient's ability to carry out ADLs; assess patient's baseline for ADL function and identify physical deficits which impact ability to perform ADLs (bathing, care of mouth/teeth, toileting, grooming, dressing, etc.)  - Assess/evaluate cause of self-care deficits   - Assess range of motion  - Assess patient's mobility; develop plan if impaired  - Assess patient's need for assistive devices and provide as appropriate  - Encourage maximum independence but intervene and supervise when necessary  - Involve family in performance of ADLs  - Assess for home care needs following discharge   - Consider OT consult to assist with ADL evaluation and planning for discharge  - Provide patient education as appropriate  Outcome: Progressing  Goal: Maintains/Returns to pre admission functional level  Description: INTERVENTIONS:  - Perform BMAT or MOVE assessment daily.   - Set and communicate daily mobility goal to care team and patient/family/caregiver.    - Collaborate with rehabilitation services on mobility goals if consulted  - Perform Range of Motion 3 times a day. - Reposition patient every 2 hours. - Dangle patient 3 times a day  - Stand patient 3 times a day  - Ambulate patient 3 times a day  - Out of bed to chair 3 times a day   - Out of bed for meals 3 times a day  - Out of bed for toileting  - Record patient progress and toleration of activity level   Outcome: Progressing     Problem: Prexisting or High Potential for Compromised Skin Integrity  Goal: Skin integrity is maintained or improved  Description: INTERVENTIONS:  - Identify patients at risk for skin breakdown  - Assess and monitor skin integrity  - Assess and monitor nutrition and hydration status  - Monitor labs   - Assess for incontinence   - Turn and reposition patient  - Assist with mobility/ambulation  - Relieve pressure over bony prominences  - Avoid friction and shearing  - Provide appropriate hygiene as needed including keeping skin clean and dry  - Evaluate need for skin moisturizer/barrier cream  - Collaborate with interdisciplinary team   - Patient/family teaching  - Consider wound care consult   Outcome: Progressing     Problem: Neurological Deficit  Goal: Neurological status is stable or improving  Description: Interventions:  - Monitor and assess patient's level of consciousness, motor function, sensory function, and level of assistance needed for ADLs. - Monitor and report changes from baseline. Collaborate with interdisciplinary team to initiate plan and implement interventions as ordered. - Provide and maintain a safe environment. - Consider seizure precautions. - Consider fall precautions. - Consider aspiration precautions. - Consider bleeding precautions. Outcome: Progressing     Problem: Activity Intolerance/Impaired Mobility  Goal: Mobility/activity is maintained at optimum level for patient  Description: Interventions:  - Assess and monitor patient  barriers to mobility and need for assistive/adaptive devices.   - Assess patient's emotional response to limitations. - Collaborate with interdisciplinary team and initiate plans and interventions as ordered. - Encourage independent activity per ability.  - Maintain proper body alignment. - Perform active/passive rom as tolerated/ordered. - Plan activities to conserve energy.  - Turn patient as appropriate  Outcome: Progressing     Problem: Communication Impairment  Goal: Ability to express needs and understand communication  Description: Assess patient's communication skills and ability to understand information. Patient will demonstrate use of effective communication techniques, alternative methods of communication and understanding even if not able to speak. - Encourage communication and provide alternate methods of communication as needed. - Collaborate with case management/ for discharge needs. - Include patient/family/caregiver in decisions related to communication. Outcome: Progressing     Problem: Potential for Aspiration  Goal: Non-ventilated patient's risk of aspiration is minimized  Description: Assess and monitor vital signs, respiratory status, and labs (WBC). Monitor for signs of aspiration (tachypnea, cough, rales, wheezing, cyanosis, fever). - Assess and monitor patient's ability to swallow. - Place patient up in chair to eat if possible. - HOB up at 90 degrees to eat if unable to get patient up into chair.  - Supervise patient during oral intake. - Instruct patient/ family to take small bites. - Instruct patient/ family to take small single sips when taking liquids. - Follow patient-specific strategies generated by speech pathologist.  Outcome: Progressing  Goal: Ventilated patient's risk of aspiration is minimized  Description: Assess and monitor vital signs, respiratory status, airway cuff pressure, and labs (WBC). Monitor for signs of aspiration (tachypnea, cough, rales, wheezing, cyanosis, fever).     - Elevate head of bed 30 degrees if patient has tube feeding.  - Monitor tube feeding. Outcome: Progressing     Problem: Nutrition  Goal: Nutrition/Hydration status is improving  Description: Monitor and assess patient's nutrition/hydration status for malnutrition (ex- brittle hair, bruises, dry skin, pale skin and conjunctiva, muscle wasting, smooth red tongue, and disorientation). Collaborate with interdisciplinary team and initiate plan and interventions as ordered. Monitor patient's weight and dietary intake as ordered or per policy. Utilize nutrition screening tool and intervene per policy. Determine patient's food preferences and provide high-protein, high-caloric foods as appropriate. - Assist patient with eating.  - Allow adequate time for meals.  - Encourage patient to take dietary supplement as ordered. - Collaborate with clinical nutritionist.  - Include patient/family/caregiver in decisions related to nutrition.   Outcome: Progressing

## 2023-07-19 NOTE — ED ATTENDING ATTESTATION
7/18/2023  Wendy MARTINEZ MD, saw and evaluated the patient. I have discussed the patient with the resident/non-physician practitioner and agree with the resident's/non-physician practitioner's findings, Plan of Care, and MDM as documented in the resident's/non-physician practitioner's note, except where noted. All available labs and Radiology studies were reviewed. I was present for key portions of any procedure(s) performed by the resident/non-physician practitioner and I was immediately available to provide assistance. At this point I agree with the current assessment done in the Emergency Department. I have conducted an independent evaluation of this patient a history and physical is as follows:        Final Diagnosis:  1. Stroke-like symptoms      Chief Complaint   Patient presents with   • Weakness - Generalized     Pt coming from home c/o generalized weakness, numbness, tingling to right side of face, bilateral arms and legs. Hx of stroke 12 years ago. Pt states has been feeling unwell for past couple days. Right sided facial droop noted in triage. 38-year-old female with extensive history of bipolar disorder, CVA, hypertension, migraine, hyperlipidemia, anxiety who presents with slurred speech and left-sided weakness. Patient is anxious and obtaining history is difficult. Patient states that she has a speech deficit at baseline. However, this worsened around 5 PM associated with "numbness/weakness" of the left side. PMH:  -CVA, bipolar disorder, hypertension, hyperlipidemia, migraine, anxiety  PSH:  -Not applicable    PE:   Vitals:    07/19/23 0015 07/19/23 0030 07/19/23 0045 07/19/23 0100   BP: 137/82 145/76 141/69 105/74   BP Location: Left arm Left arm Left arm Left arm   Pulse: 73 71 74 95   Resp: 21 22 22 22   Temp:    98.5 °F (36.9 °C)   TempSrc:    Temporal   SpO2: 98% 100% 98% 100%   Weight:             Constitutional: She appears well-developed. She is cooperative. HENT:   Head: Questionable, intermittent right-sided facial droop. Mouth/Throat: Uvula is midline, oropharynx is clear and moist and mucous membranes are normal.  Edentulous. Eyes: Pupils are equal, round, and reactive to light. Conjunctivae and EOM are normal.   Neck: Trachea normal. No thyroid mass and no thyromegaly present. Cardiovascular: Normal rate, regular rhythm, normal heart sounds. No murmur heard. Pulmonary/Chest: Effort normal and breath sounds normal.   Abdominal: Soft. Normal appearance and bowel sounds are normal. There is no tenderness. There is no rebound, no guarding. Neurological: She is alert. Strength 5 out of 5 throughout. Paresthesias of left upper and left lower extremity. Skin: Skin is warm, dry and intact. Psychiatric: Patient appears anxious. A:  -Chronically ill 76-year-old female who presents with speech deficit and left-sided paresthesias. Unfortunately, patient has many previous visits for her anxiety and complaints of a wide variety of neurodeficits. P:  -Stroke alert called. Refer to resident documentation for NIH stroke scale. Labs, EKG, CTA head and neck. Will speak with neurology regarding further work-up and disposition. - 13 point ROS was performed and all are normal unless stated in the history above. - Nursing note reviewed. Vitals reviewed. - Orders placed by myself and/or advanced practitioner / resident.    - Previous chart was reviewed  - No language barrier.   - History obtained from patient. - There are no limitations to the history obtained. - Critical care time: 35 minutes. - Patient does not need initiation of IV thrombolytics: Outside of window for thrombolytics.        Medications   iohexol (OMNIPAQUE) 350 MG/ML injection (SINGLE-DOSE) 100 mL (100 mL Intravenous Given 7/18/23 2226)   LORazepam (ATIVAN) injection 1 mg (1 mg Intravenous Given 7/18/23 2304)   clopidogrel (PLAVIX) tablet 300 mg (300 mg Oral Given 7/18/23 2340) aspirin tablet 325 mg (325 mg Oral Given 7/18/23 6451)     CTA stroke alert (head/neck)   Final Result      No stenosis, dissection or occlusion of the carotid or vertebral arteries or major vessels of the [place]. Findings were directly discussed with Kristen Lucia at  10:49 PM  .                           Workstation performed: [id number]         CT stroke alert brain   Final Result      No acute evidence of acute vascular territorial infarction, intracranial hemorrhage or mass.       Findings were directly discussed with Kristen Lucia at  10:30 PM  .      Workstation performed: [id number]         X-ray chest 1 view portable    (Results Pending)     Orders Placed This Encounter   Procedures   • Critical Care   • FLU/RSV/COVID - if FLU/RSV clinically relevant   • CT stroke alert brain   • CTA stroke alert (head/neck)   • X-ray chest 1 view portable   • Basic metabolic panel   • CBC and Platelet   • Protime-INR   • APTT   • HS Troponin 0hr (reflex protocol)   • HS Troponin I 2hr   • Continuous cardiac monitoring   • Continuous pulse oximetry   • Neuro checks   • Weigh patient and record   • Insert peripheral IV   • Nursing dysphagia Screen   • Nasal cannula oxygen   • Fingerstick Glucose (POCT)   • Consult to Neurology   • Contact and airborne isolation status   • ECG 12 lead   • ECG 12 lead   • ECG 12 lead   • ECG 12 lead   • Place in Observation     Labs Reviewed   BASIC METABOLIC PANEL - Abnormal       Result Value Ref Range Status    Sodium 142  135 - 147 mmol/L Final    Potassium 3.1 (*) 3.5 - 5.3 mmol/L Final    Chloride 109 (*) 96 - 108 mmol/L Final    CO2 21  21 - 32 mmol/L Final    ANION GAP 12  mmol/L Final    BUN 5  5 - 25 mg/dL Final    Creatinine 0.88  0.60 - 1.30 mg/dL Final    Comment: Standardized to IDMS reference method    Glucose 146 (*) 65 - 140 mg/dL Final    Comment: If the patient is fasting, the ADA then defines impaired fasting glucose as > 100 mg/dL and diabetes as > or equal to 123 mg/dL. Calcium 8.8  8.4 - 10.2 mg/dL Final    eGFR 72  ml/min/1.73sq m Final    Narrative:     [place] guidelines for Chronic Kidney Disease (CKD):   •  Stage 1 with normal or high GFR (GFR > 90 mL/min/1.73 square meters)  •  Stage 2 Mild CKD (GFR = 60-89 mL/min/1.73 square meters)  •  Stage 3A Moderate CKD (GFR = 45-59 mL/min/1.73 square meters)  •  Stage 3B Moderate CKD (GFR = 30-44 mL/min/1.73 square meters)  •  Stage 4 Severe CKD (GFR = 15-29 mL/min/1.73 square meters)  •  Stage 5 End Stage CKD (GFR <15 mL/min/1.73 square meters)  Note: GFR calculation is accurate only with a steady state creatinine   CBC AND PLATELET - Abnormal    WBC 3.84 (*) 4.31 - 10.16 Thousand/uL Final    RBC 4.75  3.81 - 5.12 Million/uL Final    Hemoglobin 14.0  11.5 - 15.4 g/dL Final    Hematocrit 41.9  34.8 - 46.1 % Final    MCV 88  82 - 98 fL Final    MCH 29.5  26.8 - 34.3 pg Final    MCHC 33.4  31.4 - 37.4 g/dL Final    RDW 13.9  11.6 - 15.1 % Final    Platelets 938  391 - 390 Thousands/uL Final    MPV 9.5  8.9 - 12.7 fL Final   COVID19, INFLUENZA A/B, RSV PCR, [facility] - Normal    SARS-CoV-2 Negative  Negative Final    Comment:      INFLUENZA A PCR Negative  Negative Final    Comment:      INFLUENZA B PCR Negative  Negative Final    Comment:      RSV PCR Negative  Negative Final    Comment:      Narrative:     FOR PEDIATRIC PATIENTS - copy/paste COVID Guidelines URL to browser: [email or web address]    SARS-CoV-2 assay is a Nucleic Acid Amplification assay intended for the  qualitative detection of nucleic acid from SARS-CoV-2 in nasopharyngeal  swabs. Results are for the presumptive identification of SARS-CoV-2 RNA. Positive results are indicative of infection with SARS-CoV-2, the virus  causing COVID-19, but do not rule out bacterial infection or co-infection  with other viruses.  Laboratories within the [facility] and its  territories are required to report all positive results to the appropriate  public health authorities. Negative results do not preclude SARS-CoV-2  infection and should not be used as the sole basis for treatment or other  patient management decisions. Negative results must be combined with  clinical observations, patient history, and epidemiological information. This test has not been FDA cleared or approved. This test has been authorized by FDA under an Emergency Use Authorization  (EUA). This test is only authorized for the duration of time the  declaration that circumstances exist justifying the authorization of the  emergency use of an in vitro diagnostic tests for detection of SARS-CoV-2  virus and/or diagnosis of COVID-19 infection under section 564(b)(1) of  the Act, 21 U. S.C. 428MVP-5(S)(6), unless the authorization is terminated  or revoked sooner. The test has been validated but independent review by FDA  and CLIA is pending. Test performed using [employer] GeneXpert: This RT-PCR assay targets N2,  a region unique to SARS-CoV-2. A conserved region in the E-gene was chosen  for pan-Sarbecovirus detection which includes SARS-CoV-2. According to CMS-2020-01-R, this platform meets the definition of high-throughput technology. PROTIME-INR - Normal    Protime 12.6  11.6 - 14.5 seconds Final    INR 0.95  0.84 - 1.19 Final   APTT - Normal    PTT 26  23 - 37 seconds Final    Comment: Therapeutic Heparin Range =  60-90 seconds   HS TROPONIN I 0HR - Normal    hs TnI 0hr 2  "Refer to ACS Flowchart"- see link ng/L Final    Comment:                                              Initial (time 0) result  If >=50 ng/L, Myocardial injury suggested ;  Type of myocardial injury and treatment strategy  to be determined. If 5-49 ng/L, a delta result at 2 hours and or 4 hours will be needed to further evaluate.   If <4 ng/L, and chest pain has been >3 hours since onset, patient may qualify for discharge based on the HEART score in the ED. If <5 ng/L and <3hours since onset of chest pain, a delta result at 2 hours will be needed to further evaluate. HS Troponin 99th Percentile URL of a Health Population=12 ng/L with a 95% Confidence Interval of 8-18 ng/L. Second Troponin (time 2 hours)  If calculated delta >= 20 ng/L,  Myocardial injury suggested ; Type of myocardial injury and treatment strategy to be determined. If 5-49 ng/L and the calculated delta is 5-19 ng/L, consult medical service for evaluation. Continue evaluation for ischemia on ecg and other possible etiology and repeat hs troponin at 4 hours. If delta is <5 ng/L at 2 hours, consider discharge based on risk stratification via the HEART score (if in ED), or ELEUTERIO risk score in IP/Observation. HS Troponin 99th Percentile URL of a Health Population=12 ng/L with a 95% Confidence Interval of 8-18 ng/L.   HS TROPONIN I 2HR - Normal    hs TnI 2hr <2  "Refer to ACS Flowchart"- see link ng/L Final    Comment:                                              Initial (time 0) result  If >=50 ng/L, Myocardial injury suggested ;  Type of myocardial injury and treatment strategy  to be determined. If 5-49 ng/L, a delta result at 2 hours and or 4 hours will be needed to further evaluate. If <4 ng/L, and chest pain has been >3 hours since onset, patient may qualify for discharge based on the HEART score in the ED. If <5 ng/L and <3hours since onset of chest pain, a delta result at 2 hours will be needed to further evaluate. HS Troponin 99th Percentile URL of a Health Population=12 ng/L with a 95% Confidence Interval of 8-18 ng/L. Second Troponin (time 2 hours)  If calculated delta >= 20 ng/L,  Myocardial injury suggested ; Type of myocardial injury and treatment strategy to be determined. If 5-49 ng/L and the calculated delta is 5-19 ng/L, consult medical service for evaluation.   Continue evaluation for ischemia on ecg and other possible etiology and repeat hs troponin at 4 hours.  If delta is <5 ng/L at 2 hours, consider discharge based on risk stratification via the HEART score (if in ED), or ELEUTERIO risk score in IP/Observation. HS Troponin 99th Percentile URL of a Health Population=12 ng/L with a 95% Confidence Interval of 8-18 ng/L. Delta 2hr hsTnI <0  <20 ng/L Final   POCT GLUCOSE - Normal    POC Glucose 140  65 - 140 mg/dl Final   POCT GLUCOSE - Normal    POC Glucose 99  65 - 140 mg/dl Final     Time reflects when diagnosis was documented in both MDM as applicable and the Disposition within this note     Time User Action Codes Description Comment    7/18/2023 10:14 PM Virginia Rendon Add [R29.90] Stroke-like symptoms       ED Disposition     ED Disposition   Admit    Condition   Stable    Date/Time   Tue Jul 18, 2023 11:41 PM    Comment   Case was discussed with internal medicine and neurology and the patient's admission status was agreed to be Admission Status: observation status to the service of Dr. Tyler Langford .            Follow-up Information    None       Current Discharge Medication List      CONTINUE these medications which have NOT CHANGED    Details   amLODIPine (NORVASC) 10 mg tablet TAKE 1 TABLET BY MOUTH EVERY DAY  Qty: 90 tablet, Refills: 3    Associated Diagnoses: Hypertension, unspecified type      aspirin (ECOTRIN LOW STRENGTH) 81 mg EC tablet Take 1 tablet (81 mg total) by mouth daily  Qty: 90 tablet, Refills: 3    Associated Diagnoses: History of CVA (cerebrovascular accident)      atorvastatin (LIPITOR) 40 mg tablet TAKE 1 TABLET BY MOUTH EVERY DAY  Qty: 90 tablet, Refills: 3    Associated Diagnoses: History of CVA (cerebrovascular accident)      benztropine (COGENTIN) 1 mg tablet Take 1 mg by mouth 2 (two) times a day      busPIRone (BUSPAR) 10 mg tablet Take 20 mg by mouth 3 (three) times a day       docusate sodium (COLACE) 100 mg capsule Take 1 capsule (100 mg total) by mouth every 12 (twelve) hours  Qty: 60 capsule, Refills: 0    Associated Diagnoses: Abdominal pain      ferrous sulfate 324 (65 Fe) mg Take 1 tablet (324 mg total) by mouth daily  Qty: 30 tablet, Refills: 1    Associated Diagnoses: Primary osteoarthritis of left knee      folic acid (FOLVITE) 1 mg tablet TAKE 1 TABLET BY MOUTH EVERY DAY  Qty: 90 tablet, Refills: 1    Associated Diagnoses: Primary osteoarthritis of left knee      hydrOXYzine HCL (ATARAX) 50 mg tablet Take 50 mg by mouth 3 (three) times a day as needed for itching      Ingrezza 40 MG CAPS Take 40 mg by mouth in the morning  Qty: 30 capsule, Refills: 5    Associated Diagnoses: Tardive dyskinesia      lidocaine (LMX) 4 % cream Apply topically as needed for mild pain  Qty: 30 g, Refills: 0    Associated Diagnoses: Chronic pain disorder      lithium carbonate (LITHOBID) 300 mg CR tablet TAKE 1 TABLET BY MOUTH DAILY AT BEDTIME  Qty: 90 tablet, Refills: 1    Associated Diagnoses: Medical clearance for psychiatric admission      LORazepam (ATIVAN) 0.5 mg tablet       mirtazapine (REMERON) 7.5 MG tablet TAKE 1 TABLET BY MOUTH EVERY EVENING. Qty: 90 tablet, Refills: 1    Associated Diagnoses: Medicare annual wellness visit, initial      Multiple Vitamin (multivitamin) tablet Take 1 tablet by mouth daily  Qty: 30 tablet, Refills: 1    Associated Diagnoses: Primary osteoarthritis of left knee      pantoprazole (PROTONIX) 20 mg tablet Take 20 mg by mouth daily      PARoxetine (PAXIL) 30 mg tablet Take 60 mg by mouth 2 (two) times a day. Takes two (60 mg) tabs by mouth nightly   Indications: Social Anxiety Disorder      polyethylene glycol (MIRALAX) 17 g packet Take 17 g by mouth daily  Qty: 100 each, Refills: 0    Associated Diagnoses: Abdominal pain      prazosin (MINIPRESS) 2 mg capsule Take 2 mg by mouth daily at bedtime. Indications: Frightening Dreams      pregabalin (LYRICA) 50 mg capsule 1 capsule twice a day. May increase to 1 capsule 3 times per day if needed.   Qty: 90 capsule, Refills: 3    Associated Diagnoses: Chronic pain disorder      QUEtiapine (SEROquel) 50 mg tablet TAKE 1 TAB (50 MG) BY MOUTH 3 TIMES A DAY. (9 AM, 4 PM, AND AT BEDTIME)           No discharge procedures on file. Prior to Admission Medications   Prescriptions Last Dose Informant Patient Reported? Taking? Ingrezza 40 MG CAPS   No No   Sig: Take 40 mg by mouth in the morning   LORazepam (ATIVAN) 0.5 mg tablet   Yes No   LORazepam (Ativan) 0.5 mg tablet   No No   Sig: Take 1 tablet (0.5 mg total) by mouth every 8 (eight) hours as needed for anxiety for up to 5 days   Patient taking differently: Take 0.5 mg by mouth 2 (two) times a day   Multiple Vitamin (multivitamin) tablet   No No   Sig: Take 1 tablet by mouth daily   Patient not taking: Reported on 7/10/2023   PARoxetine (PAXIL) 30 mg tablet   Yes No   Sig: Take 60 mg by mouth 2 (two) times a day.  Takes two (60 mg) tabs by mouth nightly   Indications: Social Anxiety Disorder   Patient not taking: Reported on 7/10/2023   QUEtiapine (SEROquel) 50 mg tablet   Yes No   Sig: TAKE 1 TAB (50 MG) BY MOUTH 3 TIMES A DAY. (9 AM, 4 PM, AND AT BEDTIME)   amLODIPine (NORVASC) 10 mg tablet   No No   Sig: TAKE 1 TABLET BY MOUTH EVERY DAY   aspirin (ECOTRIN LOW STRENGTH) 81 mg EC tablet   No No   Sig: Take 1 tablet (81 mg total) by mouth daily   atorvastatin (LIPITOR) 40 mg tablet   No No   Sig: TAKE 1 TABLET BY MOUTH EVERY DAY   benztropine (COGENTIN) 1 mg tablet   Yes No   Sig: Take 1 mg by mouth 2 (two) times a day   Patient not taking: Reported on 7/10/2023   busPIRone (BUSPAR) 10 mg tablet   Yes No   Sig: Take 20 mg by mouth 3 (three) times a day    Patient not taking: Reported on 7/10/2023   docusate sodium (COLACE) 100 mg capsule   No No   Sig: Take 1 capsule (100 mg total) by mouth every 12 (twelve) hours   ferrous sulfate 324 (65 Fe) mg   No No   Sig: Take 1 tablet (324 mg total) by mouth daily   folic acid (FOLVITE) 1 mg tablet   No No   Sig: TAKE 1 TABLET BY MOUTH EVERY DAY   hydrOXYzine HCL (ATARAX) 50 mg tablet   Yes No   Sig: Take 50 mg by mouth 3 (three) times a day as needed for itching   lidocaine (LMX) 4 % cream   No No   Sig: Apply topically as needed for mild pain   Patient not taking: Reported on 7/10/2023   lithium carbonate (LITHOBID) 300 mg CR tablet   No No   Sig: TAKE 1 TABLET BY MOUTH DAILY AT BEDTIME   Patient not taking: Reported on 7/10/2023   mirtazapine (REMERON) 7.5 MG tablet   No No   Sig: TAKE 1 TABLET BY MOUTH EVERY EVENING.   pantoprazole (PROTONIX) 20 mg tablet   Yes No   Sig: Take 20 mg by mouth daily   Patient not taking: Reported on 7/10/2023   polyethylene glycol (MIRALAX) 17 g packet   No No   Sig: Take 17 g by mouth daily   Patient not taking: Reported on 7/10/2023   prazosin (MINIPRESS) 2 mg capsule   Yes No   Sig: Take 2 mg by mouth daily at bedtime. Indications: Frightening Dreams   Patient not taking: Reported on 7/10/2023   pregabalin (LYRICA) 50 mg capsule   No No   Si capsule twice a day. May increase to 1 capsule 3 times per day if needed. Facility-Administered Medications: None       Portions of the record may have been created with voice recognition software. Occasional wrong word or "sound a like" substitutions may have occurred due to the inherent limitations of voice recognition software. Read the chart carefully and recognize, using context, where substitutions have occurred.       ED Course         Critical Care Time  CriticalCare Time    Date/Time: 2023 10:26 PM    Performed by: Jody Brizuela MD  Authorized by: Jody Brizuela MD    Critical care provider statement:     Critical care time (minutes):  35    Critical care time was exclusive of:  Separately billable procedures and treating other patients    Critical care was necessary to treat or prevent imminent or life-threatening deterioration of the following conditions:  CNS failure or compromise    Critical care was time spent personally by me on the following activities:  Obtaining history from patient or surrogate, development of treatment plan with patient or surrogate, evaluation of patient's response to treatment, examination of patient, review of old charts, re-evaluation of patient's condition, ordering and review of radiographic studies and ordering and review of laboratory studies    I assumed direction of critical care for this patient from another provider in my specialty: no

## 2023-07-19 NOTE — H&P
[facility]  H&P  Name: Princess Perez 59 y.o. female I MRN: [id number]  Unit/Bed#: E4 -01 I Date of Admission: 7/18/2023   Date of Service: 7/19/2023 I Hospital Day: 0      Assessment/Plan   Panic disorder without agoraphobia  Assessment & Plan  Current episode could be panic attack/psych related vs acute TIA/CVA, reportedly has been changing some of her psychiatric medications?, tearful and very anxious on admission     Plan:  • Continue hydroxyzine, lorazepam, mirtazapine and Ingrezza  • Consult: psychiatry       Essential hypertension  Assessment & Plan  Hypertensive on admission    Plan:  • Holding amlodipine 10mg for possible stroke      * Stroke-like episode  Assessment & Plan  Presents tonight for reported R sided facial droop, L hemibody decreased sensation and slurred speech. On admission after receiving ativan her symptoms reportedly improved. She remained somewhat dysarthric but also did not have dentures. She was very anxious, tearful and tangential in speech    Plan:  • Stroke alert called with CTH/CTA without evidence of acute intracranial abnormalities, neuro recommending stroke pathway  • Continue stroke pathway  • Maintain telemetry   • Check a1c, lipids   • Consults: neurology/psychiatry      VTE Pharmacologic Prophylaxis: VTE Score: 2 Low Risk (Score 0-2) - Encourage Ambulation. Code Status: Level 1 - Full Code   Discussion with family: Patient declined call to . Anticipated Length of Stay: Patient will be admitted on an observation basis with an anticipated length of stay of less than 2 midnights secondary to stroke-like episode.     Total Time Spent on Date of Encounter in care of patient: 75 minutes This time was spent on one or more of the following: performing physical exam; counseling and coordination of care; obtaining or reviewing history; documenting in the medical record; reviewing/ordering tests, medications or procedures; communicating with other healthcare professionals and discussing with patient's family/caregivers. Chief Complaint: weakness    History of Present Illness:  Samantha Oliver is a 59 y.o. female with a PMH of CVA, HTN, HLD, bipolar, anxiety, tardive dyskinesia, cognitive impairment, chronic pain syndrome who presents with stroke alert. History limited from patient due to mental status, supplemented with chart review and discussion with ED resident. She presents tonight due to a possible R facial droop for several days. No facial droop noted on initial exam. She states that the L side of her body had decreased sensation and she began slurring her words at 5pm. After this she continued to get anxious then called 911 for help due to history of her stroke. She states someone is changing her psychiatric medications which is making her more anxious. She suffers from panic disorders. She became tearful and could not provide more meaningful history because of her anxiety    Review of Systems:  Review of Systems   Constitutional: Negative for chills and fever. Respiratory: Negative for shortness of breath. Cardiovascular: Negative for chest pain. Gastrointestinal: Negative for abdominal pain. Neurological: Negative for dizziness, syncope and facial asymmetry. Past Medical and Surgical History:   Past Medical History:   Diagnosis Date   • Anxiety    • Arthritis     left knee   • Arthritis of right knee 10/6/2020   • At risk for falls    • Bipolar 2 disorder ([address])     FOLLOWS WITH PSYCHIATRIST.  CONTINUE LAMOTRIGINE; RESOLVED: [id number]   • Depression    • Familial tremor     both hands   • Fibromyalgia     LAST ASSESSED: [id number]   • GERD (gastroesophageal reflux disease)    • Hearing aid worn     left ear   • Teller (hard of hearing)     left ear   • Hyperlipidemia    • Hypertension    • Left-sided weakness    • Lumbar disc disease with radiculopathy 2/2/2018   • Memory loss of unknown cause     long and short term   • Migraine    • Multiple closed fractures of metatarsal bone of right foot 2021   • Obesity    • Obesity, Class II, BMI 35-39.9    • Osteoarthritis of both hips 10/31/2016   • Osteoarthritis of knee 2013    Description: Continue Tylenol and Naproxen. Encourage exercise and weight loss. Patient refused physical therapy. I will refer the patient back to Orthopedics. • Overactive bladder    • Panic attack    • Patellofemoral disorder of both knees 2020   • Post traumatic stress disorder    • Primary localized osteoarthritis of both knees 2017   • Primary osteoarthritis of both knees 2020   • S/P insertion of spinal cord stimulator     no remote   • S/P total knee arthroplasty, right 3/10/2022   • Sacroiliitis ([address]) 2017   • Seasonal allergies    • Seizure-like activity ([address]) 6/3/2022   • Seizures ([address])     possible seizure like activity   • Small bowel obstruction ([address]) 3/24/2023   • Status post total knee replacement, left 2022   • Stroke [facility])     questionable stroke    • Tardive dyskinesia     PATIENT STATES   • 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   CONT ASA 81 MG DAILY; RESOLVED: [id number]   • Urinary incontinence    • Uses walker    • Wears dentures     partial lower / full upper- doesnt wear   • Wears glasses        Past Surgical History:   Procedure Laterality Date   • BACK SURGERY     •  SECTION     • COLONOSCOPY      RESOLVED: [id number]   • EAR SURGERY     • EGD     • HYSTERECTOMY     • MYRINGOTOMY W/ TUBES Left    • NECK SURGERY  2019   • KS ARTHRP KNE CONDYLE&PLATU MEDIAL&LAT COMPARTMENTS Right 3/9/2022    Procedure: ARTHROPLASTY KNEE TOTAL;  Surgeon: Joyce Bae DO;  Location: [facility];  Service: Orthopedics   • KS ARTHRP KNE CONDYLE&PLATU MEDIAL&LAT COMPARTMENTS Left 2022    Procedure: ARTHROPLASTY KNEE TOTAL;  Surgeon: Joyce Bae DO;  [facility];  Service: Orthopedics   • KS CYSTOURETHROSCOPY N/A 2/18/2016    Procedure: CYSTOSCOPY, BOTOX INJECTION;  Surgeon: Falguni Marley MD;  Location: [facility];  Service: Gynecology   • MA INSJ/RPLCMT SPI NPGR DIR/INDUXIVE COUPLING Right 2/10/2021    Procedure: REPLACEMENT IMPLANTABLE PULSE GENERATOR DORSAL SPINAL COLUMN STIMULATOR, RIGHT;  Surgeon: Kayli Kuhn MD;  Location: [facility];  Service: Neurosurgery   • MA PRQ [address] NSTIM ELECTRODE ARRAY EPIDURAL Right 7/28/2020    Procedure: INSERTION THORACIC DORSAL COLUMN SPINAL CORD STIMULATOR PERCUTANEOUS W IMPLANTABLE PULSE GENERATOR, RIGHT;  Surgeon: Kayli Kuhn MD;  Location: [facility];  Service: Neurosurgery   • TONSILLECTOMY     • TUBAL LIGATION  1986   • UPPER GASTROINTESTINAL ENDOSCOPY  09/2020       Meds/Allergies:  Prior to Admission medications    Medication Sig Start Date End Date Taking?  Authorizing Provider   amLODIPine (NORVASC) 10 mg tablet TAKE 1 TABLET BY MOUTH EVERY DAY 7/26/22   Glo Valente, DO   aspirin (ECOTRIN LOW STRENGTH) 81 mg EC tablet Take 1 tablet (81 mg total) by mouth daily 10/12/22   Fiordaliza Parker,    atorvastatin (LIPITOR) 40 mg tablet TAKE 1 TABLET BY MOUTH EVERY DAY 4/21/23   Glo Valente,    benztropine (COGENTIN) 1 mg tablet Take 1 mg by mouth 2 (two) times a day  Patient not taking: Reported on 7/10/2023 4/12/22   Historical Provider, MD   busPIRone (BUSPAR) 10 mg tablet Take 20 mg by mouth 3 (three) times a day   Patient not taking: Reported on 7/10/2023 11/8/21   Historical Provider, MD   docusate sodium (COLACE) 100 mg capsule Take 1 capsule (100 mg total) by mouth every 12 (twelve) hours 5/25/23   Romario Pham PA-C   ferrous sulfate 324 (65 Fe) mg Take 1 tablet (324 mg total) by mouth daily 6/2/22   Dariana Grates, DO   folic acid (FOLVITE) 1 mg tablet TAKE 1 TABLET BY MOUTH EVERY DAY 6/16/22   Arpan Quinonez PA-C   hydrOXYzine HCL (ATARAX) 50 mg tablet Take 50 mg by mouth 3 (three) times a day as needed for itching Historical Provider, MD   Ingrezza 40 MG CAPS Take 40 mg by mouth in the morning 7/10/23   MERCY Cotter   lidocaine (LMX) 4 % cream Apply topically as needed for mild pain  Patient not taking: Reported on 7/10/2023 6/23/23   Hardeep Hwang PA-C   lithium carbonate (LITHOBID) 300 mg CR tablet TAKE 1 TABLET BY MOUTH DAILY AT BEDTIME  Patient not taking: Reported on 7/10/2023 2/1/23   Glo Valente DO   LORazepam (ATIVAN) 0.5 mg tablet  4/25/23   Historical Provider, MD   LORazepam (Ativan) 0.5 mg tablet Take 1 tablet (0.5 mg total) by mouth every 8 (eight) hours as needed for anxiety for up to 5 days  Patient taking differently: Take 0.5 mg by mouth 2 (two) times a day 6/23/23 7/10/23  Hardeep Hwang PA-C   mirtazapine (REMERON) 7.5 MG tablet TAKE 1 TABLET BY MOUTH EVERY EVENING. 3/13/23   Glo Valente DO   Multiple Vitamin (multivitamin) tablet Take 1 tablet by mouth daily  Patient not taking: Reported on 7/10/2023 6/2/22   Shannan Tuttle DO   pantoprazole (PROTONIX) 20 mg tablet Take 20 mg by mouth daily  Patient not taking: Reported on 7/10/2023    Historical Provider, MD   PARoxetine (PAXIL) 30 mg tablet Take 60 mg by mouth 2 (two) times a day. Takes two (60 mg) tabs by mouth nightly   Indications: Social Anxiety Disorder  Patient not taking: Reported on 7/10/2023    Historical Provider, MD   polyethylene glycol (MIRALAX) 17 g packet Take 17 g by mouth daily  Patient not taking: Reported on 7/10/2023 5/25/23   Ronald Denny PA-C   prazosin (MINIPRESS) 2 mg capsule Take 2 mg by mouth daily at bedtime. Indications: Frightening Dreams  Patient not taking: Reported on 7/10/2023    Historical Provider, MD   pregabalin (LYRICA) 50 mg capsule 1 capsule twice a day. May increase to 1 capsule 3 times per day if needed.  7/10/23   MERCY Cotter   QUEtiapine (SEROquel) 50 mg tablet TAKE 1 TAB (50 MG) BY MOUTH 3 TIMES A DAY. (9 AM, 4 PM, AND AT BEDTIME) 2/7/23   Historical Provider, MD   acetaminophen (Acetaminophen Extra Strength) 500 mg tablet Take 1 tablet (500 mg total) by mouth every 6 (six) hours as needed for mild pain  Patient not taking: Reported on 7/17/2023 2/17/23 7/19/23  Glo Valente DO   acetaminophen (TYLENOL) 650 mg CR tablet Take 1 tablet (650 mg total) by mouth every 8 (eight) hours as needed for mild pain 6/23/23 7/19/23  Yee Garrett PA-C     I have reviewed home medications using recent Epic encounter. Allergies: No Known Allergies    Social History:  Marital Status:    Occupation:   Patient Pre-hospital Living Situation:   Patient Pre-hospital Level of Mobility:   Patient Pre-hospital Diet Restrictions:   Substance Use History:   Social History     Substance and Sexual Activity   Alcohol Use Not Currently    Comment: 2 x year; being a social drinker as per all scripts      Social History     Tobacco Use   Smoking Status Never   Smokeless Tobacco Never     Social History     Substance and Sexual Activity   Drug Use No       Family History:  Family History   Problem Relation Age of Onset   • Colon cancer Mother    • Alzheimer's disease Father    • Stroke Father    • Colon cancer Brother    • Bipolar disorder Brother    • Breast cancer Maternal Aunt    • Colon cancer Maternal Aunt    • Heart disease Other    • Diabetes Other    • Hypertension Other    • Seizures Son    • Depression Paternal Grandfather    • No Known Problems Sister    • No Known Problems Brother    • Thyroid disease Neg Hx        Physical Exam:     Vitals:   Blood Pressure: 126/90 (07/19/23 0400)  Pulse: 72 (07/19/23 0400)  Temperature: 97.5 °F (36.4 °C) (07/19/23 0400)  Temp Source: Temporal (07/19/23 0400)  Respirations: 22 (07/19/23 0400)  Weight - Scale: 76.5 kg (168 lb 10.4 oz) (07/18/23 2156)  SpO2: 98 % (07/19/23 0400)    Physical Exam  Vitals and nursing note reviewed. Constitutional:       General: She is not in acute distress.      Appearance: She is well-developed. HENT:      Head: Normocephalic and atraumatic. Eyes:      Conjunctiva/sclera: Conjunctivae normal.   Cardiovascular:      Rate and Rhythm: Normal rate and regular rhythm. Heart sounds: No murmur heard. Pulmonary:      Effort: Pulmonary effort is normal. No respiratory distress. Breath sounds: Normal breath sounds. Abdominal:      Palpations: Abdomen is soft. Tenderness: There is no abdominal tenderness. Musculoskeletal:         General: No swelling. Cervical back: Neck supple. Skin:     General: Skin is warm and dry. Capillary Refill: Capillary refill takes less than 2 seconds. Neurological:      Mental Status: She is alert. GCS: GCS eye subscore is 4. GCS verbal subscore is 5. GCS motor subscore is 6. Cranial Nerves: Dysarthria (no dentures) present. Motor: Motor function is intact.       Comments: Poor patient cooperation   Psychiatric:         Mood and Affect: Mood normal.          Additional Data:     Lab Results:  Results from last 7 days   Lab Units 07/18/23 2217 07/17/23  1347   WBC Thousand/uL 3.84* 5.58   HEMOGLOBIN g/dL 14.0 14.7   HEMATOCRIT % 41.9 42.8   PLATELETS Thousands/uL 261 305   NEUTROS PCT %  --  74   LYMPHS PCT %  --  20   MONOS PCT %  --  5   EOS PCT %  --  1     Results from last 7 days   Lab Units 07/18/23 2217   SODIUM mmol/L 142   POTASSIUM mmol/L 3.1*   CHLORIDE mmol/L 109*   CO2 mmol/L 21   BUN mg/dL 5   CREATININE mg/dL 0.88   ANION GAP mmol/L 12   CALCIUM mg/dL 8.8   GLUCOSE RANDOM mg/dL 146*     Results from last 7 days   Lab Units 07/18/23  2217   INR  0.95     Results from last 7 days   Lab Units 07/18/23  2254 07/18/23  2152   POC GLUCOSE mg/dl 99 140               Lines/Drains:  Invasive Devices     Peripheral Intravenous Line  Duration           Peripheral IV 07/18/23 Left Antecubital <1 day                    Imaging: Reviewed radiology reports from this admission including: CT head and Personally reviewed the following imaging: CT head  CTA stroke alert (head/neck)   Final Result by Javy Chapin MD (07/18 2251)      No stenosis, dissection or occlusion of the carotid or vertebral arteries or major vessels of the [place]. Findings were directly discussed with Harry Huang at  10:49 PM  .                           Workstation performed: [id number]         CT stroke alert brain   Final Result by Javy Chapin MD (07/18 2251)      No acute evidence of acute vascular territorial infarction, intracranial hemorrhage or mass. Findings were directly discussed with Harry Huang at  10:30 PM  .      Workstation performed: [id number]         X-ray chest 1 view portable    (Results Pending)       EKG and Other Studies Reviewed on Admission:   · EKG: NSR. HR 72.    ** Please Note: This note has been constructed using a voice recognition system.  **

## 2023-07-19 NOTE — PLAN OF CARE
Problem: Potential for Falls  Goal: Patient will remain free of falls  Description: INTERVENTIONS:  - Educate patient/family on patient safety including physical limitations  - Instruct patient to call for assistance with activity   - Consult OT/PT to assist with strengthening/mobility   - Keep Call bell within reach  - Keep bed low and locked with side rails adjusted as appropriate  - Keep care items and personal belongings within reach  - Initiate and maintain comfort rounds  - Make Fall Risk Sign visible to staff  - Offer Toileting every 2 Hours, in advance of need  - Initiate/Maintain bed alarm  - Obtain necessary fall risk management equipment: alarm  - Apply yellow socks and bracelet for high fall risk patients  - Consider moving patient to room near nurses station  Outcome: Progressing     Problem: MOBILITY - ADULT  Goal: Maintain or return to baseline ADL function  Description: INTERVENTIONS:  -  Assess patient's ability to carry out ADLs; assess patient's baseline for ADL function and identify physical deficits which impact ability to perform ADLs (bathing, care of mouth/teeth, toileting, grooming, dressing, etc.)  - Assess/evaluate cause of self-care deficits   - Assess range of motion  - Assess patient's mobility; develop plan if impaired  - Assess patient's need for assistive devices and provide as appropriate  - Encourage maximum independence but intervene and supervise when necessary  - Involve family in performance of ADLs  - Assess for home care needs following discharge   - Consider OT consult to assist with ADL evaluation and planning for discharge  - Provide patient education as appropriate  Outcome: Progressing  Goal: Maintains/Returns to pre admission functional level  Description: INTERVENTIONS:  - Perform BMAT or MOVE assessment daily.   - Set and communicate daily mobility goal to care team and patient/family/caregiver.    - Collaborate with rehabilitation services on mobility goals if consulted  - Perform Range of Motion 2 times a day. - Reposition patient every 2 hours. - Dangle patient 2 times a day  - Stand patient 2 times a day  - Ambulate patient 2 times a day  - Out of bed to chair 2 times a day   - Out of bed for meals 2 times a day  - Out of bed for toileting  - Record patient progress and toleration of activity level   Outcome: Progressing     Problem: Prexisting or High Potential for Compromised Skin Integrity  Goal: Skin integrity is maintained or improved  Description: INTERVENTIONS:  - Identify patients at risk for skin breakdown  - Assess and monitor skin integrity  - Assess and monitor nutrition and hydration status  - Monitor labs   - Assess for incontinence   - Turn and reposition patient  - Assist with mobility/ambulation  - Relieve pressure over bony prominences  - Avoid friction and shearing  - Provide appropriate hygiene as needed including keeping skin clean and dry  - Evaluate need for skin moisturizer/barrier cream  - Collaborate with interdisciplinary team   - Patient/family teaching  - Consider wound care consult   Outcome: Progressing     Problem: Neurological Deficit  Goal: Neurological status is stable or improving  Description: Interventions:  - Monitor and assess patient's level of consciousness, motor function, sensory function, and level of assistance needed for ADLs. - Monitor and report changes from baseline. Collaborate with interdisciplinary team to initiate plan and implement interventions as ordered. - Provide and maintain a safe environment. - Consider seizure precautions. - Consider fall precautions. - Consider aspiration precautions. - Consider bleeding precautions. Outcome: Progressing     Problem: Activity Intolerance/Impaired Mobility  Goal: Mobility/activity is maintained at optimum level for patient  Description: Interventions:  - Assess and monitor patient  barriers to mobility and need for assistive/adaptive devices.   - Assess patient's emotional response to limitations. - Collaborate with interdisciplinary team and initiate plans and interventions as ordered. - Encourage independent activity per ability.  - Maintain proper body alignment. - Perform active/passive rom as tolerated/ordered. - Plan activities to conserve energy.  - Turn patient as appropriate  Outcome: Progressing     Problem: Communication Impairment  Goal: Ability to express needs and understand communication  Description: Assess patient's communication skills and ability to understand information. Patient will demonstrate use of effective communication techniques, alternative methods of communication and understanding even if not able to speak. - Encourage communication and provide alternate methods of communication as needed. - Collaborate with case management/ for discharge needs. - Include patient/family/caregiver in decisions related to communication. Outcome: Progressing     Problem: Potential for Aspiration  Goal: Non-ventilated patient's risk of aspiration is minimized  Description: Assess and monitor vital signs, respiratory status, and labs (WBC). Monitor for signs of aspiration (tachypnea, cough, rales, wheezing, cyanosis, fever). - Assess and monitor patient's ability to swallow. - Place patient up in chair to eat if possible. - HOB up at 90 degrees to eat if unable to get patient up into chair.  - Supervise patient during oral intake. - Instruct patient/ family to take small bites. - Instruct patient/ family to take small single sips when taking liquids. - Follow patient-specific strategies generated by speech pathologist.  Outcome: Progressing  Goal: Ventilated patient's risk of aspiration is minimized  Description: Assess and monitor vital signs, respiratory status, airway cuff pressure, and labs (WBC). Monitor for signs of aspiration (tachypnea, cough, rales, wheezing, cyanosis, fever).     - Elevate head of bed 30 degrees if patient has tube feeding.  - Monitor tube feeding. Outcome: Progressing     Problem: Nutrition  Goal: Nutrition/Hydration status is improving  Description: Monitor and assess patient's nutrition/hydration status for malnutrition (ex- brittle hair, bruises, dry skin, pale skin and conjunctiva, muscle wasting, smooth red tongue, and disorientation). Collaborate with interdisciplinary team and initiate plan and interventions as ordered. Monitor patient's weight and dietary intake as ordered or per policy. Utilize nutrition screening tool and intervene per policy. Determine patient's food preferences and provide high-protein, high-caloric foods as appropriate. - Assist patient with eating.  - Allow adequate time for meals.  - Encourage patient to take dietary supplement as ordered. - Collaborate with clinical nutritionist.  - Include patient/family/caregiver in decisions related to nutrition.   Outcome: Progressing

## 2023-07-19 NOTE — TELEMEDICINE
TeleConsultation - [address] [facility] 59 y.o. female MRN: [id number]  Unit/Bed#: E4 -01 Encounter: [id number]        REQUIRED DOCUMENTATION:     1. This service was provided via Telemedicine. 2. Provider located at [facility].  3. TeleMed provider: Stefanie Dancer, MD.  4. Identify all parties in room with patient during tele consult:  pt  5. Patient was then informed that this was a Telemedicine visit and that the exam was being conducted confidentially over secure lines. My office door was closed. No one else was in the room. Patient acknowledged consent and understanding of privacy and security of the Telemedicine visit, and gave us permission to have the assistant stay in the room in order to assist with the history and to conduct the exam.  I informed the patient that I have reviewed their record in Epic and presented the opportunity for them to ask any questions regarding the visit today. The patient agreed to participate. Assessment/Plan     Present on Admission:  • Essential hypertension  • Tardive dyskinesia  • Panic disorder without agoraphobia    Assessment:    Unspecified anxiety disorder; unspecified mood disorder; bipolar 1 disorder by history; panic disorder by history    Treatment Plan:    Consider increasing Seroquel to 50 mg p.o. 4 times daily as mood stabilizer and for anxiety. The patient complains that Ativan wears off quickly. Consider changing Ativan to Klonopin 0.5 mg p.o. twice daily for less peak/trough variation as mood stabilizer and for anxiety. Continue Remeron 15 mg p.o. nightly. No suicide precautions are indicated. Upon discharge, outpatient psychiatric follow-up is indicated to include medication management with a psychotherapy/counseling/case management component. The patient is in agreement this plan. She gives her informed consent for the medication adjustment.     Current Medications:     Current Facility-Administered Medications   Medication Dose Route Frequency Provider Last Rate   • acetaminophen  650 mg Oral Q4H PRN Trace Corado PA-C     • aluminum-magnesium hydroxide-simethicone  30 mL Oral Q6H PRN Trace Corado PA-C     • docusate sodium  100 mg Oral Q12H [address] Trace Corado PA-C     • folic acid  5,417 mcg Oral Daily Trace Corado PA-C     • hydrOXYzine HCL  50 mg Oral TID PRN Trace Corado PA-C     • LORazepam  0.5 mg Oral BID Trace Corado PA-C     • mirtazapine  7.5 mg Oral QPM Trace Corado PA-C     • ondansetron  4 mg Intravenous Q6H PRN Trace Corado PA-C     • polyethylene glycol  17 g Oral Daily Trace Corado PA-C     • pregabalin  50 mg Oral Daily Trace Corado PA-C     • QUEtiapine  50 mg Oral TID Trace Corado PA-C     • Valbenazine Tosylate  40 mg Oral Daily Trace Corado PA-C         Risks / Benefits of Treatment:    Risks, benefits, and possible side effects of medications explained to patient and patient verbalizes understanding. Other treatment modalities recommended as indicated:    · outpatient referral      Inpatient consult to Psychiatry  Consult performed by: Chas Wallace MD  Consult ordered by: Trace Corado PA-C        Physician Requesting Consult: Valentine Zhou MD  Principal Problem:Stroke-like episode    Reason for Consult: Bipolar disorder      History of Present Illness      Patient is a 59 y.o. female who presented to the emergency department where the resident document the following:  Arlene Mullins is a 59 y.o. female with PMH CVA who presents to the emergency department as a stroke alert. EMS reported patient had a possible right facial droop for several days, however no facial droop noted on initial exam.  Patient with slurred speech and subjective left-sided numbness, which she states started at 5 PM today, stroke alert called and patient taken to CT.  on subsequent chart review, patient has had previous visits with neurologic complaints with panic attacks.     Prior to Admission Medications   Prescriptions Last Dose Informant Patient Reported? Taking? Ingrezza 40 MG CAPS     No No   Sig: Take 40 mg by mouth in the morning   LORazepam (ATIVAN) 0.5 mg tablet     Yes No   LORazepam (Ativan) 0.5 mg tablet     No No   Sig: Take 1 tablet (0.5 mg total) by mouth every 8 (eight) hours as needed for anxiety for up to 5 days   Patient taking differently: Take 0.5 mg by mouth 2 (two) times a day   Multiple Vitamin (multivitamin) tablet     No No   Sig: Take 1 tablet by mouth daily   Patient not taking: Reported on 7/10/2023   PARoxetine (PAXIL) 30 mg tablet     Yes No   Sig: Take 60 mg by mouth 2 (two) times a day.  Takes two (60 mg) tabs by mouth nightly   Indications: Social Anxiety Disorder   Patient not taking: Reported on 7/10/2023   QUEtiapine (SEROquel) 50 mg tablet     Yes No   Sig: TAKE 1 TAB (50 MG) BY MOUTH 3 TIMES A DAY. (9 AM, 4 PM, AND AT BEDTIME)   acetaminophen (Acetaminophen Extra Strength) 500 mg tablet     No No   Sig: Take 1 tablet (500 mg total) by mouth every 6 (six) hours as needed for mild pain   Patient not taking: Reported on 7/17/2023   acetaminophen (TYLENOL) 650 mg CR tablet     No No   Sig: Take 1 tablet (650 mg total) by mouth every 8 (eight) hours as needed for mild pain   amLODIPine (NORVASC) 10 mg tablet     No No   Sig: TAKE 1 TABLET BY MOUTH EVERY DAY   aspirin (ECOTRIN LOW STRENGTH) 81 mg EC tablet     No No   Sig: Take 1 tablet (81 mg total) by mouth daily   atorvastatin (LIPITOR) 40 mg tablet     No No   Sig: TAKE 1 TABLET BY MOUTH EVERY DAY   benztropine (COGENTIN) 1 mg tablet     Yes No   Sig: Take 1 mg by mouth 2 (two) times a day   Patient not taking: Reported on 7/10/2023   busPIRone (BUSPAR) 10 mg tablet     Yes No   Sig: Take 20 mg by mouth 3 (three) times a day    Patient not taking: Reported on 7/10/2023   docusate sodium (COLACE) 100 mg capsule     No No   Sig: Take 1 capsule (100 mg total) by mouth every 12 (twelve) hours   ferrous sulfate 324 (65 Fe) mg     No No   Sig: Take 1 tablet (324 mg total) by mouth daily   folic acid (FOLVITE) 1 mg tablet     No No   Sig: TAKE 1 TABLET BY MOUTH EVERY DAY   hydrOXYzine HCL (ATARAX) 50 mg tablet     Yes No   Sig: Take 50 mg by mouth 3 (three) times a day as needed for itching   lidocaine (LMX) 4 % cream     No No   Sig: Apply topically as needed for mild pain   Patient not taking: Reported on 7/10/2023   lithium carbonate (LITHOBID) 300 mg CR tablet     No No   Sig: TAKE 1 TABLET BY MOUTH DAILY AT BEDTIME   Patient not taking: Reported on 7/10/2023   mirtazapine (REMERON) 7.5 MG tablet     No No   Sig: TAKE 1 TABLET BY MOUTH EVERY EVENING.   pantoprazole (PROTONIX) 20 mg tablet     Yes No   Sig: Take 20 mg by mouth daily   Patient not taking: Reported on 7/10/2023   polyethylene glycol (MIRALAX) 17 g packet     No No   Sig: Take 17 g by mouth daily   Patient not taking: Reported on 7/10/2023   prazosin (MINIPRESS) 2 mg capsule     Yes No   Sig: Take 2 mg by mouth daily at bedtime. Indications: Frightening Dreams   Patient not taking: Reported on 7/10/2023   pregabalin (LYRICA) 50 mg capsule     No No   Si capsule twice a day. May increase to 1 capsule 3 times per day if needed. Facility-Administered Medications: None         Medical History        Past Medical History:   Diagnosis Date   • Anxiety     • Arthritis       left knee   • Arthritis of right knee 10/6/2020   • At risk for falls     • Bipolar 2 disorder (HCC)       FOLLOWS WITH PSYCHIATRIST.  CONTINUE LAMOTRIGINE; RESOLVED: [id number]   • Depression     • Familial tremor       both hands   • Fibromyalgia       LAST ASSESSED: [id number]   • GERD (gastroesophageal reflux disease)     • Hearing aid worn       left ear   • [place] (hard of hearing)       left ear   • Hyperlipidemia     • Hypertension     • Left-sided weakness     • Lumbar disc disease with radiculopathy 2018   • Memory loss of unknown cause       long and short term   • Migraine     • Multiple closed fractures of metatarsal bone of right foot 2021   • Obesity     • Obesity, Class II, BMI 35-39. 9     • Osteoarthritis of both hips 10/31/2016   • Osteoarthritis of knee 2013     Description: Continue Tylenol and Naproxen. Encourage exercise and weight loss. Patient refused physical therapy. I will refer the patient back to Orthopedics. • Overactive bladder     • Panic attack     • Patellofemoral disorder of both knees 2020   • Post traumatic stress disorder     • Primary localized osteoarthritis of both knees 2017   • Primary osteoarthritis of both knees 2020   • S/P insertion of spinal cord stimulator       no remote   • S/P total knee arthroplasty, right 3/10/2022   • Sacroiliitis ([address]) 2017   • Seasonal allergies     • Seizure-like activity ([address]) 6/3/2022   • Seizures ([address])       possible seizure like activity   • Status post total knee replacement, left 2022   • Stroke [facility])       questionable stroke    • Tardive dyskinesia       PATIENT STATES   • Thrombosis of cerebral arteries       WITH L RESIDUAL WEAKNESS.   CONT ASA 81 MG DAILY; RESOLVED: [id number]   • Urinary incontinence     • Uses walker     • Wears dentures       partial lower / full upper- doesnt wear   • Wears glasses              Surgical History   Past Surgical History:   Procedure Laterality Date   • BACK SURGERY       •  SECTION      • COLONOSCOPY         RESOLVED: [id number]   • EAR SURGERY       • EGD       • HYSTERECTOMY      • MYRINGOTOMY W/ TUBES Left     • NECK SURGERY   2019   • OK ARTHRP KNE CONDYLE&PLATU MEDIAL&LAT COMPARTMENTS Right 3/9/2022     Procedure: ARTHROPLASTY KNEE TOTAL;  Surgeon: Brenda Allen DO;  Location: [facility];  Service: Orthopedics   • OK ARTHRP KNE CONDYLE&PLATU MEDIAL&LAT COMPARTMENTS Left 2022     Procedure: ARTHROPLASTY KNEE TOTAL;  Surgeon: Brenda Allen DO;  Location: [facility];  Service: Orthopedics   • OK CYSTOURETHROSCOPY N/A 2016     Procedure: CYSTOSCOPY, BOTOX INJECTION;  Surgeon: Hadley Mckeon MD;  Location: [facility];  Service: Gynecology   • IA INSJ/RPLCMT SPI NPGR DIR/INDUXIVE COUPLING Right 2/10/2021     Procedure: REPLACEMENT IMPLANTABLE PULSE GENERATOR DORSAL SPINAL COLUMN STIMULATOR, RIGHT;  Surgeon: Olivia Chen MD;  Location: [facility];  Service: Neurosurgery   • IA PRQ IMPLTJ NSTIM ELECTRODE ARRAY EPIDURAL Right 7/28/2020     Procedure: INSERTION THORACIC DORSAL COLUMN SPINAL CORD STIMULATOR PERCUTANEOUS W IMPLANTABLE PULSE GENERATOR, RIGHT;  Surgeon: Olivia Chen MD;  Location: [facility];  Service: Neurosurgery   • TONSILLECTOMY       • TUBAL LIGATION   1986   • UPPER GASTROINTESTINAL ENDOSCOPY   09/2020            Family History         Family History   Problem Relation Age of Onset   • Colon cancer Mother     • Alzheimer's disease Father     • Stroke Father     • Colon cancer Brother     • Bipolar disorder Brother     • Breast cancer Maternal Aunt     • Colon cancer Maternal Aunt     • Heart disease Other     • Diabetes Other     • Hypertension Other     • Seizures Son     • Depression Paternal Grandfather     • No Known Problems Sister     • No Known Problems Brother     • Thyroid disease Neg Hx           I have reviewed and agree with the history as documented.           E-Cigarette/Vaping   • E-Cigarette Use Never User              E-Cigarette/Vaping Substances   • Nicotine No     • THC No     • CBD No     • Flavoring No     • Other No     • Unknown No        Social History            Tobacco Use   • Smoking status: Never   • Smokeless tobacco: Never   Vaping Use   • Vaping Use: Never used   Substance Use Topics   • Alcohol use: Not Currently       Comment: 2 x year; being a social drinker as per all scripts    • Drug use: No         Home medications:  Prior to Admission Medications   Prescriptions Last Dose Informant Patient Reported? Taking?    Ingrezza 40 MG CAPS     No No   Sig: Take 40 mg by mouth in the morning   LORazepam (ATIVAN) 0.5 mg tablet     Yes No   LORazepam (Ativan) 0.5 mg tablet     No No   Sig: Take 1 tablet (0.5 mg total) by mouth every 8 (eight) hours as needed for anxiety for up to 5 days   Patient taking differently: Take 0.5 mg by mouth 2 (two) times a day   Multiple Vitamin (multivitamin) tablet     No No   Sig: Take 1 tablet by mouth daily   Patient not taking: Reported on 7/10/2023   PARoxetine (PAXIL) 30 mg tablet     Yes No   Sig: Take 60 mg by mouth 2 (two) times a day.  Takes two (60 mg) tabs by mouth nightly   Indications: Social Anxiety Disorder   Patient not taking: Reported on 7/10/2023   QUEtiapine (SEROquel) 50 mg tablet     Yes No   Sig: TAKE 1 TAB (50 MG) BY MOUTH 3 TIMES A DAY. (9 AM, 4 PM, AND AT BEDTIME)   acetaminophen (Acetaminophen Extra Strength) 500 mg tablet     No No   Sig: Take 1 tablet (500 mg total) by mouth every 6 (six) hours as needed for mild pain   Patient not taking: Reported on 7/17/2023   acetaminophen (TYLENOL) 650 mg CR tablet     No No   Sig: Take 1 tablet (650 mg total) by mouth every 8 (eight) hours as needed for mild pain   amLODIPine (NORVASC) 10 mg tablet     No No   Sig: TAKE 1 TABLET BY MOUTH EVERY DAY   aspirin (ECOTRIN LOW STRENGTH) 81 mg EC tablet     No No   Sig: Take 1 tablet (81 mg total) by mouth daily   atorvastatin (LIPITOR) 40 mg tablet     No No   Sig: TAKE 1 TABLET BY MOUTH EVERY DAY   benztropine (COGENTIN) 1 mg tablet     Yes No   Sig: Take 1 mg by mouth 2 (two) times a day   Patient not taking: Reported on 7/10/2023   busPIRone (BUSPAR) 10 mg tablet     Yes No   Sig: Take 20 mg by mouth 3 (three) times a day    Patient not taking: Reported on 7/10/2023   docusate sodium (COLACE) 100 mg capsule     No No   Sig: Take 1 capsule (100 mg total) by mouth every 12 (twelve) hours   ferrous sulfate 324 (65 Fe) mg     No No   Sig: Take 1 tablet (324 mg total) by mouth daily   folic acid (FOLVITE) 1 mg tablet     No No   Sig: TAKE 1 TABLET BY MOUTH EVERY DAY hydrOXYzine HCL (ATARAX) 50 mg tablet     Yes No   Sig: Take 50 mg by mouth 3 (three) times a day as needed for itching   lidocaine (LMX) 4 % cream     No No   Sig: Apply topically as needed for mild pain   Patient not taking: Reported on 7/10/2023   lithium carbonate (LITHOBID) 300 mg CR tablet     No No   Sig: TAKE 1 TABLET BY MOUTH DAILY AT BEDTIME   Patient not taking: Reported on 7/10/2023   mirtazapine (REMERON) 7.5 MG tablet     No No   Sig: TAKE 1 TABLET BY MOUTH EVERY EVENING.   pantoprazole (PROTONIX) 20 mg tablet     Yes No   Sig: Take 20 mg by mouth daily   Patient not taking: Reported on 7/10/2023   polyethylene glycol (MIRALAX) 17 g packet     No No   Sig: Take 17 g by mouth daily   Patient not taking: Reported on 7/10/2023   prazosin (MINIPRESS) 2 mg capsule     Yes No   Sig: Take 2 mg by mouth daily at bedtime. Indications: Frightening Dreams   Patient not taking: Reported on 7/10/2023   pregabalin (LYRICA) 50 mg capsule     No No   Si capsule twice a day. May increase to 1 capsule 3 times per day if needed. Facility-Administered Medications: None      Allergies:  No Known Allergies     Review of Systems   Constitutional: Positive for fatigue. Negative for fever. Respiratory: Positive for shortness of breath. Cardiovascular: Positive for chest pain. Gastrointestinal: Positive for nausea. Neurological: Positive for numbness. All other systems reviewed and are negative. The patient tells me her primary concern is her "panic attacks". She speaks of generalized anxiety that interferes with her sleep. She reports normal appetite. Nursing reports the patient has been highly anxious. Nursing reports the patient has been oriented x4 but seems to be disorganized in her communication. Nursing also reports she has been very fidgety in general but has been compliant with medications and care.     Past psychiatric history: The patient is very circumstantial making it difficult to get a detailed history. She does give history of diagnoses of bipolar 1 disorder and anxiety which she refers to as "panic attacks" but poorly describes these episodes. The best I could discern from the information she shared there is that she currently sees a psychiatrist, therapist and case management but seems to be wanting to explore psychiatric treatment elsewhere for reasons unclear. Social history: Patient reports she is . She has 3 adult children 1 daughter with whom she lives in 2 sons. 1 son is a current [employer]. She reports no abuse. Family history: The patient states her sister was previously mentally institutionalized. She does not seem to know details. Substance use history: The patient denies he is substance use. [place]-Suicide Severity Rating Scale   Wish you're dead or unable to wake up No   Do you have thoughts of killing yourself No   Started/prepared/acted to end your life No   [place] Suicide Risk None Identified   Safe Environment Assessment Indicated No     Mental status examination: The patient is alert and well oriented in all spheres. She made good eye contact. Speech was somewhat pressured as she was very circumstantial.  Speech was also slightly slurred making it difficult to comprehend her frequently especially with the circumstantial at times tangential responses. She reports history of depression and bipolar 1 disorder but states that her mood has been doing well with the exception of her anxiety which she states is interfering with her sleep. She denies any history of suicidal homicidal ideation. She denies hallucinations and other psychotic features. She is likely been in the average range of intellectual functioning by recent vocabulary, sentence structure and syntax and general fund of knowledge. Insight and judgment are fair. She is motivated for treatment of her anxiety.   She is motivated for outpatient psychiatric follow-up upon discharge. Past Medical History:   Diagnosis Date   • Anxiety    • Arthritis     left knee   • Arthritis of right knee 10/6/2020   • At risk for falls    • Bipolar 2 disorder (HCC)     FOLLOWS WITH PSYCHIATRIST. CONTINUE LAMOTRIGINE; RESOLVED: [id number]   • Depression    • Familial tremor     both hands   • Fibromyalgia     LAST ASSESSED: [id number]   • GERD (gastroesophageal reflux disease)    • Hearing aid worn     left ear   • [place] (hard of hearing)     left ear   • Hyperlipidemia    • Hypertension    • Left-sided weakness    • Lumbar disc disease with radiculopathy 2/2/2018   • Memory loss of unknown cause     long and short term   • Migraine    • Multiple closed fractures of metatarsal bone of right foot 5/2/2021   • Obesity    • Obesity, Class II, BMI 35-39.9    • Osteoarthritis of both hips 10/31/2016   • Osteoarthritis of knee 2/20/2013    Description: Continue Tylenol and Naproxen. Encourage exercise and weight loss. Patient refused physical therapy. I will refer the patient back to Orthopedics. • Overactive bladder    • Panic attack    • Patellofemoral disorder of both knees 5/1/2020   • Post traumatic stress disorder    • Primary localized osteoarthritis of both knees 6/16/2017   • Primary osteoarthritis of both knees 5/1/2020   • S/P insertion of spinal cord stimulator     no remote   • S/P total knee arthroplasty, right 3/10/2022   • Sacroiliitis ([address]) 2/28/2017   • Seasonal allergies    • Seizure-like activity ([address]) 6/3/2022   • Seizures ([address])     possible seizure like activity   • Small bowel obstruction ([address]) 3/24/2023   • Status post total knee replacement, left 7/5/2022   • Stroke [facility])     questionable stroke 2009   • Tardive dyskinesia     PATIENT STATES   • 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   CONT ASA 81 MG DAILY; RESOLVED: [id number]   • Urinary incontinence    • Uses walker    • Wears dentures     partial lower / full upper- doesnt wear   • Wears glasses Medical Review Of Systems:    Review of Systems    Meds/Allergies     all current active meds have been reviewed  No Known Allergies    Objective     Vital signs in last 24 hours:  Temp:  [97.1 °F (36.2 °C)-98.5 °F (36.9 °C)] 97.1 °F (36.2 °C)  HR:  [51-95] 51  Resp:  [16-51] 18  BP: (105-177)/() 124/94      Intake/Output Summary (Last 24 hours) at 7/19/2023 1001  Last data filed at 7/19/2023 0300  Gross per 24 hour   Intake --   Output 600 ml   Net -600 ml       Lab Results: I have personally reviewed all pertinent laboratory/tests results. Imaging Studies: CT stroke alert brain    Result Date: 7/18/2023  Narrative: CT BRAIN - STROKE ALERT PROTOCOL INDICATION:   Stroke Alert. COMPARISON: 7/17/2023. TECHNIQUE:  CT examination of the brain was performed. In addition to axial images, coronal reformatted images were created and submitted for interpretation. Radiation dose length product (DLP) for this visit:  941 mGy-cm . This examination, like all CT scans performed in the [facility], was performed utilizing techniques to minimize radiation dose exposure, including the use of iterative reconstruction and automated exposure control. IMAGE QUALITY:  Diagnostic. FINDINGS: PARENCHYMA: No acute intracranial hemorrhage, mass or mass effect. VENTRICLES AND EXTRA-AXIAL SPACES: No hydrocephalus or extra-axial collection. VISUALIZED ORBITS: Intact globes and orbits. PARANASAL SINUSES: Clear. CALVARIUM AND EXTRACRANIAL SOFT TISSUES:   Underpneumatized mastoid air cells. Partial opacification of the right mastoid air cells likely representing effusion. No lytic or blastic lesion     Impression: No acute evidence of acute vascular territorial infarction, intracranial hemorrhage or mass.  Findings were directly discussed with Víctor Weir at  10:30 PM  . Workstation performed: [id number]     CTA stroke alert (head/neck)    Result Date: 7/18/2023  Narrative: CTA NECK AND BRAIN WITH CONTRAST INDICATION: Stroke Alert COMPARISON: 6/27/2021 TECHNIQUE:   Post contrast imaging was performed after administration of iodinated contrast through the neck and brain. Post contrast axial 0.625 mm images timed to opacify the arterial system. 3D rendering was performed on an independent workstation. MIP reconstructions performed. Coronal reconstructions were performed of the noncontrast portion of the brain. Radiation dose length product (DLP) for this visit:  745 mGy-cm . This examination, like all CT scans performed in the [facility], was performed utilizing techniques to minimize radiation dose exposure, including the use of iterative reconstruction and automated exposure control. IV Contrast:  100 mL of iohexol (OMNIPAQUE) IMAGE QUALITY:   Diagnostic FINDINGS: CERVICAL VASCULATURE AORTIC ARCH AND GREAT VESSELS: Three-vessel configuration of the aortic arch. No stenosis in the subclavian arteries. RIGHT VERTEBRAL ARTERY CERVICAL SEGMENT:  Normal origin. The vessel is normal in caliber throughout the neck. LEFT VERTEBRAL ARTERY CERVICAL SEGMENT:  Normal origin. The vessel is normal in caliber throughout the neck. RIGHT EXTRACRANIAL CAROTID SEGMENT:  Normal caliber common carotid artery. Normal bifurcation and cervical internal carotid artery. No stenosis or dissection. LEFT EXTRACRANIAL CAROTID SEGMENT:  Normal caliber common carotid artery. Normal bifurcation and cervical internal carotid artery. No stenosis or dissection. NASCET criteria was used to determine the degree of internal carotid artery diameter stenosis. INTRACRANIAL VASCULATURE INTERNAL CAROTID ARTERIES: No stenosis in the carotid siphons. ANTERIOR CIRCULATION:  Symmetric A1 segments and anterior cerebral arteries with normal enhancement. Normal anterior communicating artery. MIDDLE CEREBRAL ARTERY CIRCULATION:  M1 segment and middle cerebral artery branches demonstrate normal enhancement bilaterally.  DISTAL VERTEBRAL ARTERIES: Normal distal vertebral arteries. Posterior inferior cerebellar arteries are patent. BASILAR ARTERY:  Basilar artery is normal in caliber. Patent superior cerebellar arteries. POSTERIOR CEREBRAL ARTERIES: No stenosis in the posterior cerebral arteries. Tiny left posterior communicating artery identified. VENOUS STRUCTURES: Patent dural venous sinuses. NON VASCULAR ANATOMY BONY STRUCTURES: C4-5 anterior and interbody fusion. Normal alignment. SOFT TISSUES OF THE NECK: No mass or lymphadenopathy. THORACIC INLET: Clear lung apices. Impression: No stenosis, dissection or occlusion of the carotid or vertebral arteries or major vessels of the [place]. Findings were directly discussed with Garima Blanchard at  10:49 PM  . Workstation performed: [id number]     CT head without contrast    Result Date: 7/17/2023  Narrative: CT BRAIN - WITHOUT CONTRAST INDICATION:   Neuro deficit, acute, stroke suspected headache, prior stroke. COMPARISON: CT head 4/1/2023. TECHNIQUE:  CT examination of the brain was performed. Multiplanar 2D reformatted images were created from the source data. Radiation dose length product (DLP) for this visit:  883 mGy-cm . This examination, like all CT scans performed in the [facility], was performed utilizing techniques to minimize radiation dose exposure, including the use of iterative reconstruction and automated exposure control. IMAGE QUALITY:  Diagnostic. FINDINGS: PARENCHYMA:  No intracranial mass, mass effect or midline shift. No CT signs of acute infarction. No acute parenchymal hemorrhage. VENTRICLES AND EXTRA-AXIAL SPACES:  Normal for the patient's age. VISUALIZED ORBITS: Normal visualized orbits. PARANASAL SINUSES: Normal visualized paranasal sinuses. CALVARIUM AND EXTRACRANIAL SOFT TISSUES:  Normal.     Impression: No acute intracranial abnormality.  Workstation performed: [id number]     EKG/Pathology/Other Studies:   Lab Results   Component Value Date VENTRATE 72 07/19/2023    ATRIALRATE 72 07/19/2023    PRINT 150 07/19/2023    QRSDINT 100 07/19/2023    QTINT 400 07/19/2023    QTCINT 438 07/19/2023    PAXIS 65 07/19/2023    QRSAXIS 79 07/19/2023    TWAVEAXIS 16 07/19/2023        Code Status: Level 1 - Full Code  Advance Directive and Living Will:      Power of :    POLST:      Screenings:    1. Nutrition Screening  · Not available on chart    2. Pain Screening  Not available on chart    3. Suicide Screening  Not available on chart    Counseling / Coordination of Care: Total floor / unit time spent today 30 minutes. Greater than 50% of total time was spent with the patient and / or family counseling and / or coordination of care. A description of the counseling / coordination of care: Chart review, patient evaluation, coordination and communication with staff, nursing and provider.

## 2023-07-19 NOTE — PROGRESS NOTES
Outpatient Care Management Note:    Received ADT alert that patient was in the ED on 7/17/23 and sent from [facility] for anxiety attack and right sided numbness. Patient discharged home and to follow up with mental health provider. Received another ADT alert that patient is currently admitted to [facility] - CLOSED as of 7/18/23-present for stroke-like episode. Will plan to outreach when discharged home. Chart reviewed. Per notes patient had noted a recent change to her mental health medication making her more anxious.

## 2023-07-19 NOTE — ASSESSMENT & PLAN NOTE
49-year-old female  with history of stroke, HTN, HLD, bipolar, anxiety, tardive dyskinesia, cognitive impairment and chronic pain syndrome with spinal stimulator in place who presents with right facial droop for the last few days and onset of generalized numbness (left > right). Patient reported she had been feeling increasingly anxious and felt that she was going to have a panic attack. When she does have increased anxiety, her oral dyskinesia "acts up" and she felt a right facial droop. Patient also presented to an outside ED on 7/17 for anxiety and right-sided decreased sensation that since resolved. BP on arrival 164/104. NIH 2 for dysarthria (however patient does not have her dentures in) and sensory deficits. Workup:  - CTH, CTA head/neck unremarkable  - Echo pending  - Lipid panel: Cholesterol 116, LDL 56  - Hemoglobin A1c pending    Low clinical suspicion for presenting symptoms are secondary to acute ischemia given generalized numbness (left > right) and right facial droop. There also seems to be a functional component during exam. Patient unable to obtain MRI due to spinal stimulator. Plan:  - No need for stroke pathway  - Unable to get MRI secondary to spinal stimulator. No need for further neuroimaging  - s/p Aspirin 324 mg x 1 and Plavix 300 mg x 1  - Continue home medications Aspirin/statin   - Dickson following, appreciate recommendations  - Medical management and supportive care per primary team. Correction of any metabolic or infectious disturbances. Plan discussed with Attending Neurologist, please see attestation for further input/recommendations.

## 2023-07-19 NOTE — CONSULTS
Consultation - Neurology   Samantha Dick Erlinwayne 59 y.o. female MRN: [id number]  Unit/Bed#: E4 -01 Encounter: [id number]      Assessment/Plan     * Stroke-like episode  Assessment & Plan  64-year-old female  with history of stroke, HTN, HLD, bipolar, anxiety, tardive dyskinesia, cognitive impairment and chronic pain syndrome with spinal stimulator in place who presents with right facial droop for the last few days and onset of generalized numbness (left > right). Patient reported she had been feeling increasingly anxious and felt that she was going to have a panic attack. When she does have increased anxiety, her oral dyskinesia "acts up" and she felt a right facial droop. Patient also presented to an outside ED on 7/17 for anxiety and right-sided decreased sensation that since resolved. BP on arrival 164/104. NIH 2 for dysarthria (however patient does not have her dentures in) and sensory deficits. Workup:  - CTH, CTA head/neck unremarkable  - Echo pending  - Lipid panel: Cholesterol 116, LDL 56  - Hemoglobin A1c pending    Low clinical suspicion for presenting symptoms are secondary to acute ischemia given generalized numbness (left > right) and right facial droop. There also seems to be a functional component during exam. Patient unable to obtain MRI due to spinal stimulator. Plan:  - No need for stroke pathway  - Unable to get MRI secondary to spinal stimulator. No need for further neuroimaging  - s/p Aspirin 324 mg x 1 and Plavix 300 mg x 1  - Continue home medications Aspirin/statin   - Pydottie following, appreciate recommendations  - Medical management and supportive care per primary team. Correction of any metabolic or infectious disturbances. Plan discussed with Attending Neurologist, please see attestation for further input/recommendations. Patient has follow-up appointment with [facility] November 13, 2023. She should plan on keeping this appointment.   No additional outpatient neurological testing required at this time. History of Present Illness     Reason for Consult / Principal Problem: Strokelike symptoms  Hx and PE limited by: Patient with known cognitive impairment, history obtained per chart review and per patient  HPI: Deborah Titus is a 59 y.o. female with history of stroke (2009), HTN, HLD, bipolar, anxiety, neuroleptic induced orofacial dyskinesia, cognitive impairment and chronic pain syndrome who presents with strokelike symptoms. Documentation reports that EMS stated patient had a right facial droop for the last few days. On 7/18, patient was a stroke alert for acute onset of left sided numbness and slurred speech. Patient reports that she has been feeling increasingly anxious and felt that she was having a panic attack on 7/18. She reported feeling her oral dyskinesia "acting up" (which does happen when she gets increasingly anxious) and described her left cheek feeling disc and torted with a right facial droop. She then reported all extremities became numb, however more so on the left side. Per chart review, patient presented to an outside ED on 7/17 for anxiety and decreased sensation throughout the right side that has since resolved. BP on arrival 164/104. NIH 2 for dysarthria and right-sided sensory deficits. CT head negative for acute intracranial abnormality. CTA head/neck negative for significant stenosis, dissection or occlusion. Patient was not a TNK candidate as she was outside of the appropriate window. She was loaded with Aspirin and Plavix and admitted to the hospital for concerns of stroke. Per documentation, on admission patient received Ativan for her anxiety and symptoms reportedly improved. Patient did remain dysarthric, however did not have her dentures in. Patient follows with [facility] neurology for memory loss, history of stroke, migraine and multifactorial gait dysfunction.   She last saw Jose Luis Wolfe in office July 10, 2023. During this visit, she was instructed to restart Lyrica for chronic pain syndrome, continuing [place] for TD, and continue aspirin/statin for stroke prevention. It is documented that her memory testing had improved and she was instructed to see neuropsychology and psychiatry to discuss medications to prevent anxiety attacks. Inpatient consult to Neurology  Consult performed by: [facility] Steen  Consult ordered by: Eneida Alston MD          A 12 system ROS was completed. Other than the above mentioned complaints in the HPI and those commented on below, all remaining systems were negative:    Historical Information   Past Medical History:   Diagnosis Date   • Anxiety    • Arthritis     left knee   • Arthritis of right knee 10/6/2020   • At risk for falls    • Bipolar 2 disorder (HCC)     FOLLOWS WITH PSYCHIATRIST. CONTINUE LAMOTRIGINE; RESOLVED: [id number]   • Depression    • Familial tremor     both hands   • Fibromyalgia     LAST ASSESSED: [id number]   • GERD (gastroesophageal reflux disease)    • Hearing aid worn     left ear   • [place] (hard of hearing)     left ear   • Hyperlipidemia    • Hypertension    • Left-sided weakness    • Lumbar disc disease with radiculopathy 2/2/2018   • Memory loss of unknown cause     long and short term   • Migraine    • Multiple closed fractures of metatarsal bone of right foot 5/2/2021   • Obesity    • Obesity, Class II, BMI 35-39.9    • Osteoarthritis of both hips 10/31/2016   • Osteoarthritis of knee 2/20/2013    Description: Continue Tylenol and Naproxen. Encourage exercise and weight loss. Patient refused physical therapy. I will refer the patient back to Orthopedics.    • Overactive bladder    • Panic attack    • Patellofemoral disorder of both knees 5/1/2020   • Post traumatic stress disorder    • Primary localized osteoarthritis of both knees 6/16/2017   • Primary osteoarthritis of both knees 5/1/2020   • S/P insertion of spinal cord stimulator     no remote   • S/P total knee arthroplasty, right 3/10/2022   • Sacroiliitis ([address]) 2/28/2017   • Seasonal allergies    • Seizure-like activity ([address]) 6/3/2022   • Seizures ([address])     possible seizure like activity   • Small bowel obstruction ([address]) 3/24/2023   • Status post total knee replacement, left 7/5/2022   • Stroke [facility])     questionable stroke 2009   • Tardive dyskinesia     PATIENT STATES   • 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   CONT ASA 81 MG DAILY; RESOLVED: [id number]   • Urinary incontinence    • Uses walker    • Wears dentures     partial lower / full upper- doesnt wear   • Wears glasses      Past Surgical History:   Procedure Laterality Date   • BACK SURGERY     • [id number] [facility]   • COLONOSCOPY      RESOLVED: [id number]   • EAR SURGERY     • EGD     • HYSTERECTOMY  2004   • MYRINGOTOMY W/ TUBES Left    • NECK SURGERY  04/2019   • IA ARTHRP KNE CONDYLE&PLATU MEDIAL&LAT COMPARTMENTS Right 3/9/2022    Procedure: ARTHROPLASTY KNEE TOTAL;  Surgeon: Jeffrey Hollis DO;  Location: [facility];  Service: Orthopedics   • IA ARTHRP KNE CONDYLE&PLATU MEDIAL&LAT COMPARTMENTS Left 7/5/2022    Procedure: ARTHROPLASTY KNEE TOTAL;  Surgeon: Jeffrey Hollis DO;  Location[facility];  Service: Orthopedics   • IA CYSTOURETHROSCOPY N/A 2/18/2016    Procedure: CYSTOSCOPY, BOTOX INJECTION;  Surgeon: Zainab Long MD;  Location: [facility];  Service: Gynecology   • IA INSJ/RPLCMT SPI NPGR DIR/INDUXIVE COUPLING Right 2/10/2021    Procedure: REPLACEMENT IMPLANTABLE PULSE GENERATOR DORSAL SPINAL COLUMN STIMULATOR, RIGHT;  Surgeon: Glenna Contreras MD;  Location: [facility];  Service: Neurosurgery   • IA PRQ [address] NSTIM ELECTRODE ARRAY EPIDURAL Right 7/28/2020    Procedure: INSERTION THORACIC DORSAL COLUMN SPINAL CORD STIMULATOR PERCUTANEOUS W IMPLANTABLE PULSE GENERATOR, RIGHT;  Surgeon: Glenna Contreras MD;  Location: [facility];  Service: Neurosurgery   • TONSILLECTOMY     • TUBAL LIGATION  1986   • UPPER GASTROINTESTINAL ENDOSCOPY  09/2020     Social History   Social History     Substance and Sexual Activity   Alcohol Use Not Currently    Comment: 2 x year; being a social drinker as per all scripts      Social History     Substance and Sexual Activity   Drug Use No     E-Cigarette/Vaping   • E-Cigarette Use Never User      E-Cigarette/Vaping Substances   • Nicotine No    • THC No    • CBD No    • Flavoring No    • Other No    • Unknown No      Social History     Tobacco Use   Smoking Status Never   Smokeless Tobacco Never     Family History:   Family History   Problem Relation Age of Onset   • Colon cancer Mother    • Alzheimer's disease Father    • Stroke Father    • Colon cancer Brother    • Bipolar disorder Brother    • Breast cancer Maternal Aunt    • Colon cancer Maternal Aunt    • Heart disease Other    • Diabetes Other    • Hypertension Other    • Seizures Son    • Depression Paternal Grandfather    • No Known Problems Sister    • No Known Problems Brother    • Thyroid disease Neg Hx        Review of previous medical records was completed.      Meds/Allergies   all current active meds have been reviewed, current meds:   Current Facility-Administered Medications   Medication Dose Route Frequency   • acetaminophen (TYLENOL) tablet 650 mg  650 mg Oral Q4H PRN   • aluminum-magnesium hydroxide-simethicone (MAALOX) oral suspension 30 mL  30 mL Oral Q6H PRN   • clonazePAM (KlonoPIN) tablet 0.5 mg  0.5 mg Oral BID   • docusate sodium (COLACE) capsule 100 mg  100 mg Oral U67Q [address]   • folic acid (FOLVITE) tablet 1,000 mcg  1,000 mcg Oral Daily   • LORazepam (ATIVAN) tablet 1 mg  1 mg Oral Q6H PRN   • mirtazapine (REMERON) tablet 7.5 mg  7.5 mg Oral QPM   • ondansetron (ZOFRAN) injection 4 mg  4 mg Intravenous Q6H PRN   • polyethylene glycol (MIRALAX) packet 17 g  17 g Oral Daily   • pregabalin (LYRICA) capsule 50 mg  50 mg Oral Daily   • QUEtiapine (SEROquel) tablet 50 mg  50 mg Oral 4x Daily (with meals and at bedtime)   • Valbenazine Tosylate CAPS 40 mg  40 mg Oral Daily    and PTA meds:   Prior to Admission Medications   Prescriptions Last Dose Informant Patient Reported? Taking? Ingrezza 40 MG CAPS   No No   Sig: Take 40 mg by mouth in the morning   LORazepam (ATIVAN) 0.5 mg tablet   Yes No   LORazepam (Ativan) 0.5 mg tablet   No No   Sig: Take 1 tablet (0.5 mg total) by mouth every 8 (eight) hours as needed for anxiety for up to 5 days   Patient taking differently: Take 0.5 mg by mouth 2 (two) times a day   Multiple Vitamin (multivitamin) tablet   No No   Sig: Take 1 tablet by mouth daily   Patient not taking: Reported on 7/10/2023   PARoxetine (PAXIL) 30 mg tablet   Yes No   Sig: Take 60 mg by mouth 2 (two) times a day.  Takes two (60 mg) tabs by mouth nightly   Indications: Social Anxiety Disorder   Patient not taking: Reported on 7/10/2023   QUEtiapine (SEROquel) 50 mg tablet   Yes No   Sig: TAKE 1 TAB (50 MG) BY MOUTH 3 TIMES A DAY. (9 AM, 4 PM, AND AT BEDTIME)   amLODIPine (NORVASC) 10 mg tablet   No No   Sig: TAKE 1 TABLET BY MOUTH EVERY DAY   aspirin (ECOTRIN LOW STRENGTH) 81 mg EC tablet   No No   Sig: Take 1 tablet (81 mg total) by mouth daily   atorvastatin (LIPITOR) 40 mg tablet   No No   Sig: TAKE 1 TABLET BY MOUTH EVERY DAY   benztropine (COGENTIN) 1 mg tablet   Yes No   Sig: Take 1 mg by mouth 2 (two) times a day   Patient not taking: Reported on 7/10/2023   busPIRone (BUSPAR) 10 mg tablet   Yes No   Sig: Take 20 mg by mouth 3 (three) times a day    Patient not taking: Reported on 7/10/2023   docusate sodium (COLACE) 100 mg capsule   No No   Sig: Take 1 capsule (100 mg total) by mouth every 12 (twelve) hours   ferrous sulfate 324 (65 Fe) mg   No No   Sig: Take 1 tablet (324 mg total) by mouth daily   folic acid (FOLVITE) 1 mg tablet   No No   Sig: TAKE 1 TABLET BY MOUTH EVERY DAY   hydrOXYzine HCL (ATARAX) 50 mg tablet   Yes No   Sig: Take 50 mg by mouth 3 (three) times a day as needed for itching   lidocaine (LMX) 4 % cream   No No   Sig: Apply topically as needed for mild pain   Patient not taking: Reported on 7/10/2023   lithium carbonate (LITHOBID) 300 mg CR tablet   No No   Sig: TAKE 1 TABLET BY MOUTH DAILY AT BEDTIME   Patient not taking: Reported on 7/10/2023   mirtazapine (REMERON) 7.5 MG tablet   No No   Sig: TAKE 1 TABLET BY MOUTH EVERY EVENING.   pantoprazole (PROTONIX) 20 mg tablet   Yes No   Sig: Take 20 mg by mouth daily   Patient not taking: Reported on 7/10/2023   polyethylene glycol (MIRALAX) 17 g packet   No No   Sig: Take 17 g by mouth daily   Patient not taking: Reported on 7/10/2023   prazosin (MINIPRESS) 2 mg capsule   Yes No   Sig: Take 2 mg by mouth daily at bedtime. Indications: Frightening Dreams   Patient not taking: Reported on 7/10/2023   pregabalin (LYRICA) 50 mg capsule   No No   Si capsule twice a day. May increase to 1 capsule 3 times per day if needed. Facility-Administered Medications: None       No Known Allergies    Objective   Vitals:Blood pressure 150/99, pulse 72, temperature (!) 97.1 °F (36.2 °C), temperature source Temporal, resp. rate 18, height 5' 1" (1.549 m), weight 76.2 kg (168 lb), SpO2 100 %, not currently breastfeeding. ,Body mass index is 31.74 kg/m². Intake/Output Summary (Last 24 hours) at 2023 1149  Last data filed at 2023 0300  Gross per 24 hour   Intake --   Output 600 ml   Net -600 ml       Invasive Devices: Invasive Devices     Peripheral Intravenous Line  Duration           Peripheral IV 23 Left Antecubital <1 day                Physical Exam  Vitals reviewed. Constitutional:       General: She is not in acute distress. Appearance: Normal appearance. She is normal weight. She is not ill-appearing. HENT:      Head: Normocephalic and atraumatic.       Right Ear: External ear normal.      Left Ear: External ear normal.      Nose: Nose normal.      Mouth/Throat:      Mouth: Mucous membranes are moist. Comments: Orofacial dyskinesia present  Eyes:      General:         Right eye: No discharge. Left eye: No discharge. Extraocular Movements: Extraocular movements intact and EOM normal.      Conjunctiva/sclera: Conjunctivae normal.      Pupils: Pupils are equal, round, and reactive to light. Cardiovascular:      Rate and Rhythm: Normal rate. Pulmonary:      Effort: Pulmonary effort is normal. No respiratory distress. Musculoskeletal:         General: Normal range of motion. Cervical back: Normal range of motion. No rigidity. Right lower leg: No edema. Left lower leg: No edema. Skin:     General: Skin is warm and dry. Coloration: Skin is not jaundiced or pale. Neurological:      Mental Status: She is alert and oriented to person, place, and time. Coordination: Finger-Nose-Finger Test normal.      Comments: See below   Psychiatric:      Comments: Anxious, cooperative       Neurologic Exam     Mental Status   Oriented to person, place, and time. Follows 2 step commands. Attention: normal. Concentration: normal.   Speech: (Speech is slightly slurred, however patient reports she does not have her dentures in. No aphasia)  Level of consciousness: alert  Knowledge: good. Able to name object. Normal comprehension. Cranial Nerves     CN II   Visual fields full to confrontation. CN III, IV, VI   Pupils are equal, round, and reactive to light. Extraocular motions are normal.   Nystagmus: none   Diplopia: none  Conjugate gaze: present    CN V   Facial sensation intact. CN VIII   CN VIII normal.     CN IX, X   CN IX normal.     CN XII   CN XII normal.     Patient reports decreased left sided facial sensation     Motor Exam   Muscle bulk: normal  Overall muscle tone: normal  Right arm pronator drift: absent  Left arm pronator drift: absent  Able to lift all extremities antigravity. She was seen pushing herself up in bed, however reported she felt weak.  + Ryder sign. Sensory Exam     Decreased light touch sensation throughout left side. On re-evaluation with attenting, patient reported intact light touch sensation throughout left side     Gait, Coordination, and Reflexes     Gait  Gait: (Deferred for safety)    Coordination   Finger to nose coordination: normal    Tremor   Resting tremor: absent  Intention tremor: absent  Action tremor: absent      Lab Results:   I have personally reviewed pertinent reports. , CBC:   Results from last 7 days   Lab Units 07/19/23  0520 07/18/23 2217 07/17/23  1347   WBC Thousand/uL 4.79 3.84* 5.58   RBC Million/uL 4.58 4.75 4.91   HEMOGLOBIN g/dL 13.6 14.0 14.7   HEMATOCRIT % 40.5 41.9 42.8   MCV fL 88 88 87   PLATELETS Thousands/uL 261 261 305   , BMP/CMP:   Results from last 7 days   Lab Units 07/19/23 0520 07/18/23 2217 07/17/23  1347   SODIUM mmol/L 141 142 142   POTASSIUM mmol/L 3.1* 3.1* 3.1*   CHLORIDE mmol/L 107 109* 107   CO2 mmol/L 27 21 26   BUN mg/dL 5 5 10   CREATININE mg/dL 0.70 0.88 0.76   CALCIUM mg/dL 8.4 8.8 9.2   EGFR ml/min/1.73sq m 95 72 86    Coagulation:   Results from last 7 days   Lab Units 07/18/23 2217   INR  0.95   Imaging Studies: I have personally reviewed pertinent reports. and I have personally reviewed pertinent films in PACS [address] CTA had/neck  EKG, Pathology, and Other Studies: I have personally reviewed pertinent reports.    and I have personally reviewed pertinent films in PACS  VTE Prophylaxis: Sequential compression device (Venodyne)     Code Status: Level 1 - Full Code

## 2023-07-19 NOTE — UTILIZATION REVIEW
Date: 7/19    Day 3: Has surpassed a 2nd midnight with active treatments and services, which include neuro and psych consults, r/o CVA, psych med adjustments    Initial Clinical Review    OBSERVATION 7/18 CHANGED TO INPATIENT ON 7/19 @ 1249 - CONTINUED WORKUP    Admission: Date/Time/Statement:   Admission Orders (From admission, onward)     Ordered        07/19/23 1249  Inpatient Admission  Once                      Orders Placed This Encounter   Procedures   • Inpatient Admission     Standing Status:   Standing     Number of Occurrences:   1     Order Specific Question:   Level of Care     Answer:   Med Surg [16]     Order Specific Question:   Estimated length of stay     Answer:   More than 2 Midnights     Order Specific Question:   Certification     Answer:   I certify that inpatient services are medically necessary for this patient for a duration of greater than two midnights. See H&P and MD Progress Notes for additional information about the patient's course of treatment. ED Arrival Information     Expected   -    Arrival   7/18/2023 21:47    Acuity   Emergent            Means of arrival   Ambulance    Escorted by   Family Member    Service   Hospitalist    Admission type   Emergency            Arrival complaint   medical problem           Chief Complaint   Patient presents with   • Weakness - Generalized     Pt coming from home c/o generalized weakness, numbness, tingling to right side of face, bilateral arms and legs. Hx of stroke 12 years ago. Pt states has been feeling unwell for past couple days. Right sided facial droop noted in triage. Initial Presentation: 59 y.o. female presents to the ED from home with c/o poss R facial droop, decreased sensation L side of body, slurred speech at 1700, anxiety - has had psych med changes recently. PMH: CVA, HTN, HLD, bipolar, anxiety, tardive dyskinesia, cognitive impairment, chronic pain syndrome, panic disorder. In the ED she was HTN.   Labs - low K, leukopenia. Imaging - no acute changes. Treated with IV Ativan, Plavix, ASA. On exam no facial droop, GCS 15, no dysarthria. She is admitted to OBSERVATION status with Stroke-like episode - stroke alert, imaging, tele, consult neuro. Panic disorder w/o agoraphobia vs TIA/CVA - Continue hydroxyzine, lorazepam, mirtazapine and Ingrezza, psych consult. HTN - hold Amlodipine. Date: 7/19   CHANGED TO INPATIENT TODAY -     Continuation of stroke work up. Pt is not stable for d/c today. Will need med adjustments d/t severe symptoms. Continues with low K 3.1, HTN.      7/19 Neuro Consult - PMH: prior stroke, hypertension, hyperlipidemia, bipolar disorder, anxiety, tardive dyskinesia, cognitive impairment, and chronic pain with spinal stimulator presents with multiple symptoms including increased anxiety/panic attack, right facial droop, generalized numbness. On initial evaluation, her speech was dysarthric however the patient did not have her dentures in. Imaging negative. Cannot get MRI d/t spinal stimulator. On exam + Ryder sign on R, no sensory deficits. Low suspicion for CVA, likely functional anxiety. Continue statin, ASA, no Plavix. 7/19 Psych Consult - Unspecified anxiety disorder; unspecified mood disorder; bipolar 1 disorder by history; panic disorder by history - Consider increasing Seroquel to 50 mg p.o. 4 times daily as mood stabilizer and for anxiety. The patient complains that Ativan wears off quickly. Consider changing Ativan to Klonopin 0.5 mg p.o. twice daily for less peak/trough variation as mood stabilizer and for anxiety. Continue Remeron 15 mg p.o. nightly. OP psych f/u.       DATE: 7/20  IP DAY 2:   Pt;s symptoms have resolved. Could not get MRI d/t spinal stimulator. Will continue ASA and statin. Needs OP F/u w/ Psych. Started on longer acting Klonopin BID,can use PRN Ativan. seroquel dose increased. She is written for d/c to home today.       ED Triage Vitals Temperature Pulse Respirations Blood Pressure SpO2   07/18/23 2238 07/18/23 2156 07/18/23 2156 07/18/23 2156 07/18/23 2156   97.5 °F (36.4 °C) 87 16 (!) 164/104 100 %      Temp Source Heart Rate Source Patient Position - Orthostatic VS BP Location FiO2 (%)   07/18/23 2238 07/18/23 2156 07/18/23 2156 07/18/23 2156 --   Oral Monitor Lying Left arm       Pain Score       07/19/23 0126       9          Wt Readings from Last 1 Encounters:   07/19/23 76.2 kg (168 lb)     Additional Vital Signs:   07/19/23 1200 -- 80 18 140/94 105 -- -- Lying   07/19/23 1007 -- 72 18 150/99 110 -- -- Lying   07/19/23 0930 -- 51 Abnormal  18 124/94 103 -- -- Lying   07/19/23 0929 -- 91 -- 138/104 Abnormal  -- -- -- --   07/19/23 0800 -- 91 18 138/104 Abnormal  119 -- -- Lying   07/19/23 0600 97.1 °F (36.2 °C) Abnormal  67 18 129/90 98 100 % None (Room air) Lying   07/19/23 0400 97.5 °F (36.4 °C) 72 22 126/90 98 98 % -- Lying   07/19/23 0300 97.5 °F (36.4 °C) 70 22 114/87 94 100 % -- Lying   07/19/23 0200 97.6 °F (36.4 °C) 95 22 135/80 -- 100 % -- Lying   07/19/23 0100 98.5 °F (36.9 °C) 95 22 105/74 -- 100 % None (Room air) Lying   07/19/23 0045 -- 74 22 141/69 98 98 % None (Room air) Lying   07/19/23 0030 -- 71 22 145/76 101 100 % None (Room air) Lying   07/19/23 0015 -- 73 21 137/82 103 98 % None (Room air) Lying   07/18/23 2330 -- 79 22 152/89 115 96 % None (Room air) Lying   07/18/23 2315 -- 77 22 134/94 110 100 % None (Room air) Lying   07/18/23 23:00:12 -- 92 22 146/86 -- -- None (Room air) Lying   07/18/23 2300 -- 92 47 Abnormal  -- -- 100 % -- --   07/18/23 22:56:58 -- 87 24 Abnormal  -- -- 100 % -- --   07/18/23 2255 -- 85 48 Abnormal  -- -- 100 % -- --   07/18/23 2250 -- 86 51 Abnormal  -- -- 100 % -- --   07/18/23 2246 -- -- -- 143/78 -- -- -- --   07/18/23 22:45:49 -- 87 24 Abnormal  143/78 -- 100 % None (Room air) Lying   07/18/23 2245 -- 86 44 Abnormal  -- -- 100 % -- --   07/18/23 2240 -- 74 26 Abnormal  -- -- 100 % -- -- 07/18/23 2238 97.5 °F (36.4 °C) -- -- -- -- -- -- --   07/18/23 2236 -- 80 32 Abnormal  -- -- 100 % -- --   07/18/23 2231 -- -- -- -- -- 100 % -- --   07/18/23 2230 -- 94 18 177/89 Abnormal  -- 100 % None (Room air) Lying   07/18/23 2225 -- 77 25 Abnormal  -- -- 99 % -- --   07/18/23 2220 -- 76 42 Abnormal  -- -- 100 % -- --   07/18/23 2215 -- 78 20 146/76 -- 100 % None (Room air) Lying     Pertinent Labs/Diagnostic Test Results:   X-ray chest 1 view portable   Final Result by Javy Oneill MD (07/19 1324)      No acute cardiopulmonary disease. Findings are stable      CTA stroke alert (head/neck)   Final Result by Julio C Manuel MD (07/18 2251)      No stenosis, dissection or occlusion of the carotid or vertebral arteries or major vessels of the [place]. CT stroke alert brain   Final Result by Julio C Manuel MD (07/18 2251)      No acute evidence of acute vascular territorial infarction, intracranial hemorrhage or mass.      Results from last 7 days   Lab Units 07/18/23 2257   SARS-COV-2  Negative     Results from last 7 days   Lab Units 07/19/23 0520 07/18/23 2217 07/17/23  1347   WBC Thousand/uL 4.79 3.84* 5.58   HEMOGLOBIN g/dL 13.6 14.0 14.7   HEMATOCRIT % 40.5 41.9 42.8   PLATELETS Thousands/uL 261 261 305   NEUTROS ABS Thousands/µL  --   --  4.11         Results from last 7 days   Lab Units 07/19/23 0520 07/18/23 2217 07/17/23  1347   SODIUM mmol/L 141 142 142   POTASSIUM mmol/L 3.1* 3.1* 3.1*   CHLORIDE mmol/L 107 109* 107   CO2 mmol/L 27 21 26   ANION GAP mmol/L 7 12 9   BUN mg/dL 5 5 10   CREATININE mg/dL 0.70 0.88 0.76   EGFR ml/min/1.73sq m 95 72 86   CALCIUM mg/dL 8.4 8.8 9.2         Results from last 7 days   Lab Units 07/18/23  2254 07/18/23  2152   POC GLUCOSE mg/dl 99 140     Results from last 7 days   Lab Units 07/19/23  0520 07/18/23  2217 07/17/23  1347   GLUCOSE RANDOM mg/dL 82 146* 92         Results from last 7 days   Lab Units 07/19/23  0520 HEMOGLOBIN A1C % 4.5   EAG mg/dl 82     Results from last 7 days   Lab Units 07/19/23  0029 07/18/23  2217 07/17/23  1347   HS TNI 0HR ng/L  --  2 5   HS TNI 2HR ng/L <2  --   --    HSTNI D2 ng/L <0  --   --          Results from last 7 days   Lab Units 07/18/23  2217   PROTIME seconds 12.6   INR  0.95   PTT seconds 26     Results from last 7 days   Lab Units 07/18/23  2257   INFLUENZA A PCR  Negative   INFLUENZA B PCR  Negative   RSV PCR  Negative     ED Treatment:   Medication Administration from 07/18/2023 2147 to 07/19/2023 0109       Date/Time Order Dose Route Action     07/18/2023 2226 EDT iohexol (OMNIPAQUE) 350 MG/ML injection (SINGLE-DOSE) 100 mL 100 mL Intravenous Given     07/18/2023 2304 EDT LORazepam (ATIVAN) injection 1 mg 1 mg Intravenous Given     07/18/2023 2340 EDT clopidogrel (PLAVIX) tablet 300 mg 300 mg Oral Given     07/18/2023 2341 EDT aspirin tablet 325 mg 325 mg Oral Given        Past Medical History:   Diagnosis Date   • Anxiety    • Arthritis     left knee   • Arthritis of right knee 10/6/2020   • At risk for falls    • Bipolar 2 disorder ([address])     FOLLOWS WITH PSYCHIATRIST. CONTINUE LAMOTRIGINE; RESOLVED: [id number]   • Depression    • Familial tremor     both hands   • Fibromyalgia     LAST ASSESSED: [id number]   • GERD (gastroesophageal reflux disease)    • Hearing aid worn     left ear   • [place] (hard of hearing)     left ear   • Hyperlipidemia    • Hypertension    • Left-sided weakness    • Lumbar disc disease with radiculopathy 2/2/2018   • Memory loss of unknown cause     long and short term   • Migraine    • Multiple closed fractures of metatarsal bone of right foot 5/2/2021   • Obesity    • Obesity, Class II, BMI 35-39.9    • Osteoarthritis of both hips 10/31/2016   • Osteoarthritis of knee 2/20/2013    Description: Continue Tylenol and Naproxen. Encourage exercise and weight loss. Patient refused physical therapy. I will refer the patient back to Orthopedics.    • Overactive bladder • Panic attack    • Patellofemoral disorder of both knees 5/1/2020   • Post traumatic stress disorder    • Primary localized osteoarthritis of both knees 6/16/2017   • Primary osteoarthritis of both knees 5/1/2020   • S/P insertion of spinal cord stimulator     no remote   • S/P total knee arthroplasty, right 3/10/2022   • Sacroiliitis ([address]) 2/28/2017   • Seasonal allergies    • Seizure-like activity ([address]) 6/3/2022   • Seizures ([address])     possible seizure like activity   • Small bowel obstruction ([address]) 3/24/2023   • Status post total knee replacement, left 7/5/2022   • Stroke [facility])     questionable stroke 2009   • Tardive dyskinesia     PATIENT STATES   • 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   CONT ASA 81 MG DAILY; RESOLVED: [id number]   • Urinary incontinence    • Uses walker    • Wears dentures     partial lower / full upper- doesnt wear   • Wears glasses      Present on Admission:  • Essential hypertension  • Tardive dyskinesia  • Panic disorder without agoraphobia      Admitting Diagnosis: Flank pain [R10.9]  Stroke-like symptoms [R29.90]  Age/Sex: 59 y.o. female  Admission Orders:  Scheduled Medications:  aspirin, 81 mg, Oral, Daily  atorvastatin, 40 mg, Oral, Daily With Dinner  clonazePAM, 0.5 mg, Oral, BID  docusate sodium, 100 mg, Oral, G93E KESHIA  folic acid, 7,323 mcg, Oral, Daily  mirtazapine, 7.5 mg, Oral, QPM  polyethylene glycol, 17 g, Oral, Daily  pregabalin, 50 mg, Oral, Daily  QUEtiapine, 50 mg, Oral, 4x Daily (with meals and at bedtime)  Valbenazine Tosylate, 40 mg, Oral, Daily      Continuous IV Infusions:     PRN Meds:  acetaminophen, 650 mg, Oral, Q4H PRN - X 1 7/19  aluminum-magnesium hydroxide-simethicone, 30 mL, Oral, Q6H PRN  Hydroxyzine PO x 1 7/19 and d/c   LORazepam, 1 mg, Oral, Q6H PRN  ondansetron, 4 mg, Intravenous, Q6H PRN    Weight  Incentive spirometry  NIHSS  Regular diet   Echo  IP CONSULT TO NEUROLOGY  IP CONSULT TO CASE MANAGEMENT  IP CONSULT TO PSYCHIATRY    Network Utilization Review Department  ATTENTION: Please call with any questions or concerns to [phone] and carefully listen to the prompts so that you are directed to the right person. All voicemails are confidential.  Kareen Bueno all requests for admission clinical reviews, approved or denied determinations and any other requests to dedicated fax number below belonging to the campus where the patient is receiving treatment.  List of dedicated fax numbers for the Facilities:  [place] DENIALS (Administrative/Medical Necessity) [phone] [address] [facility] (Maternity/NICU/Pediatrics) [phone]   [address] [place] [address] [phone]   [facility] [address] [facility] [phone]   [address] [facility] [address] [phone]   [address] [address] [phone]

## 2023-07-19 NOTE — ASSESSMENT & PLAN NOTE
Current episode could be panic attack/psych related vs acute TIA/CVA, reportedly has been changing some of her psychiatric medications?, tearful and very anxious on admission     Plan:  • Continue hydroxyzine, lorazepam, mirtazapine and Ingrezza  • Consult: psychiatry

## 2023-07-19 NOTE — ED PROVIDER NOTES
History  Chief Complaint   Patient presents with   • Weakness - Generalized     Pt coming from home c/o generalized weakness, numbness, tingling to right side of face, bilateral arms and legs. Hx of stroke 12 years ago. Pt states has been feeling unwell for past couple days. Right sided facial droop noted in triage. HPI  Samantha Peterson is a 59 y.o. female with PMH CVA who presents to the emergency department as a stroke alert. EMS reported patient had a possible right facial droop for several days, however no facial droop noted on initial exam.  Patient with slurred speech and subjective left-sided numbness, which she states started at 5 PM today, stroke alert called and patient taken to CT. on subsequent chart review, patient has had previous visits with neurologic complaints with panic attacks. Prior to Admission Medications   Prescriptions Last Dose Informant Patient Reported? Taking? Ingrezza 40 MG CAPS   No No   Sig: Take 40 mg by mouth in the morning   LORazepam (ATIVAN) 0.5 mg tablet   Yes No   LORazepam (Ativan) 0.5 mg tablet   No No   Sig: Take 1 tablet (0.5 mg total) by mouth every 8 (eight) hours as needed for anxiety for up to 5 days   Patient taking differently: Take 0.5 mg by mouth 2 (two) times a day   Multiple Vitamin (multivitamin) tablet   No No   Sig: Take 1 tablet by mouth daily   Patient not taking: Reported on 7/10/2023   PARoxetine (PAXIL) 30 mg tablet   Yes No   Sig: Take 60 mg by mouth 2 (two) times a day.  Takes two (60 mg) tabs by mouth nightly   Indications: Social Anxiety Disorder   Patient not taking: Reported on 7/10/2023   QUEtiapine (SEROquel) 50 mg tablet   Yes No   Sig: TAKE 1 TAB (50 MG) BY MOUTH 3 TIMES A DAY. (9 AM, 4 PM, AND AT BEDTIME)   acetaminophen (Acetaminophen Extra Strength) 500 mg tablet   No No   Sig: Take 1 tablet (500 mg total) by mouth every 6 (six) hours as needed for mild pain   Patient not taking: Reported on 7/17/2023   acetaminophen (TYLENOL) 650 mg CR tablet   No No   Sig: Take 1 tablet (650 mg total) by mouth every 8 (eight) hours as needed for mild pain   amLODIPine (NORVASC) 10 mg tablet   No No   Sig: TAKE 1 TABLET BY MOUTH EVERY DAY   aspirin (ECOTRIN LOW STRENGTH) 81 mg EC tablet   No No   Sig: Take 1 tablet (81 mg total) by mouth daily   atorvastatin (LIPITOR) 40 mg tablet   No No   Sig: TAKE 1 TABLET BY MOUTH EVERY DAY   benztropine (COGENTIN) 1 mg tablet   Yes No   Sig: Take 1 mg by mouth 2 (two) times a day   Patient not taking: Reported on 7/10/2023   busPIRone (BUSPAR) 10 mg tablet   Yes No   Sig: Take 20 mg by mouth 3 (three) times a day    Patient not taking: Reported on 7/10/2023   docusate sodium (COLACE) 100 mg capsule   No No   Sig: Take 1 capsule (100 mg total) by mouth every 12 (twelve) hours   ferrous sulfate 324 (65 Fe) mg   No No   Sig: Take 1 tablet (324 mg total) by mouth daily   folic acid (FOLVITE) 1 mg tablet   No No   Sig: TAKE 1 TABLET BY MOUTH EVERY DAY   hydrOXYzine HCL (ATARAX) 50 mg tablet   Yes No   Sig: Take 50 mg by mouth 3 (three) times a day as needed for itching   lidocaine (LMX) 4 % cream   No No   Sig: Apply topically as needed for mild pain   Patient not taking: Reported on 7/10/2023   lithium carbonate (LITHOBID) 300 mg CR tablet   No No   Sig: TAKE 1 TABLET BY MOUTH DAILY AT BEDTIME   Patient not taking: Reported on 7/10/2023   mirtazapine (REMERON) 7.5 MG tablet   No No   Sig: TAKE 1 TABLET BY MOUTH EVERY EVENING.   pantoprazole (PROTONIX) 20 mg tablet   Yes No   Sig: Take 20 mg by mouth daily   Patient not taking: Reported on 7/10/2023   polyethylene glycol (MIRALAX) 17 g packet   No No   Sig: Take 17 g by mouth daily   Patient not taking: Reported on 7/10/2023   prazosin (MINIPRESS) 2 mg capsule   Yes No   Sig: Take 2 mg by mouth daily at bedtime. Indications: Frightening Dreams   Patient not taking: Reported on 7/10/2023   pregabalin (LYRICA) 50 mg capsule   No No   Si capsule twice a day.  May increase to 1 capsule 3 times per day if needed. Facility-Administered Medications: None       Past Medical History:   Diagnosis Date   • Anxiety    • Arthritis     left knee   • Arthritis of right knee 10/6/2020   • At risk for falls    • Bipolar 2 disorder (HCC)     FOLLOWS WITH PSYCHIATRIST. CONTINUE LAMOTRIGINE; RESOLVED: [id number]   • Depression    • Familial tremor     both hands   • Fibromyalgia     LAST ASSESSED: [id number]   • GERD (gastroesophageal reflux disease)    • Hearing aid worn     left ear   • [place] (hard of hearing)     left ear   • Hyperlipidemia    • Hypertension    • Left-sided weakness    • Lumbar disc disease with radiculopathy 2/2/2018   • Memory loss of unknown cause     long and short term   • Migraine    • Multiple closed fractures of metatarsal bone of right foot 5/2/2021   • Obesity    • Obesity, Class II, BMI 35-39.9    • Osteoarthritis of both hips 10/31/2016   • Osteoarthritis of knee 2/20/2013    Description: Continue Tylenol and Naproxen. Encourage exercise and weight loss. Patient refused physical therapy. I will refer the patient back to Orthopedics. • Overactive bladder    • Panic attack    • Patellofemoral disorder of both knees 5/1/2020   • Post traumatic stress disorder    • Primary localized osteoarthritis of both knees 6/16/2017   • Primary osteoarthritis of both knees 5/1/2020   • S/P insertion of spinal cord stimulator     no remote   • S/P total knee arthroplasty, right 3/10/2022   • Sacroiliitis ([address]) 2/28/2017   • Seasonal allergies    • Seizure-like activity ([address]) 6/3/2022   • Seizures ([address])     possible seizure like activity   • Status post total knee replacement, left 7/5/2022   • Stroke [facility])     questionable stroke 2009   • Tardive dyskinesia     PATIENT STATES   • 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   CONT ASA 81 MG DAILY; RESOLVED: [id number]   • Urinary incontinence    • Uses walker    • Wears dentures     partial lower / full upper- doesnt wear • Wears glasses        Past Surgical History:   Procedure Laterality Date   • BACK SURGERY     •  SECTION     • COLONOSCOPY      RESOLVED: [id number]   • EAR SURGERY     • EGD     • HYSTERECTOMY     • MYRINGOTOMY W/ TUBES Left    • NECK SURGERY  2019   • MO ARTHRP KNE CONDYLE&PLATU MEDIAL&LAT COMPARTMENTS Right 3/9/2022    Procedure: ARTHROPLASTY KNEE TOTAL;  Surgeon: Omaira Alonso DO;  [facility];  Service: Orthopedics   • MO ARTHRP KNE CONDYLE&PLATU MEDIAL&LAT COMPARTMENTS Left 2022    Procedure: ARTHROPLASTY KNEE TOTAL;  Surgeon: Omaira Alonso DO;  [facility];  Service: Orthopedics   • MO CYSTOURETHROSCOPY N/A 2016    Procedure: CYSTOSCOPY, BOTOX INJECTION;  Surgeon: Brianna Haines MD;  [facility];  Service: Gynecology   • MO INSJ/RPLCMT SPI NPGR DIR/INDUXIVE COUPLING Right 2/10/2021    Procedure: REPLACEMENT IMPLANTABLE PULSE GENERATOR DORSAL SPINAL COLUMN STIMULATOR, RIGHT;  Surgeon: Fouzia Simon MD;  [facility];  Service: Neurosurgery   • MO PRQ IMPLTJ NSTIM ELECTRODE ARRAY EPIDURAL Right 2020    Procedure: INSERTION THORACIC DORSAL COLUMN SPINAL CORD STIMULATOR PERCUTANEOUS W IMPLANTABLE PULSE GENERATOR, RIGHT;  Surgeon: Fouzia Simon MD;  Location: [facility];  Service: Neurosurgery   • TONSILLECTOMY     • TUBAL LIGATION     • UPPER GASTROINTESTINAL ENDOSCOPY  2020       Family History   Problem Relation Age of Onset   • Colon cancer Mother    • Alzheimer's disease Father    • Stroke Father    • Colon cancer Brother    • Bipolar disorder Brother    • Breast cancer Maternal Aunt    • Colon cancer Maternal Aunt    • Heart disease Other    • Diabetes Other    • Hypertension Other    • Seizures Son    • Depression Paternal Grandfather    • No Known Problems Sister    • No Known Problems Brother    • Thyroid disease Neg Hx      I have reviewed and agree with the history as documented.     E-Cigarette/Vaping   • E-Cigarette Use Never User      E-Cigarette/Vaping Substances   • Nicotine No    • THC No    • CBD No    • Flavoring No    • Other No    • Unknown No      Social History     Tobacco Use   • Smoking status: Never   • Smokeless tobacco: Never   Vaping Use   • Vaping Use: Never used   Substance Use Topics   • Alcohol use: Not Currently     Comment: 2 x year; being a social drinker as per all scripts    • Drug use: No       Home medications:  Prior to Admission Medications   Prescriptions Last Dose Informant Patient Reported? Taking? Ingrezza 40 MG CAPS   No No   Sig: Take 40 mg by mouth in the morning   LORazepam (ATIVAN) 0.5 mg tablet   Yes No   LORazepam (Ativan) 0.5 mg tablet   No No   Sig: Take 1 tablet (0.5 mg total) by mouth every 8 (eight) hours as needed for anxiety for up to 5 days   Patient taking differently: Take 0.5 mg by mouth 2 (two) times a day   Multiple Vitamin (multivitamin) tablet   No No   Sig: Take 1 tablet by mouth daily   Patient not taking: Reported on 7/10/2023   PARoxetine (PAXIL) 30 mg tablet   Yes No   Sig: Take 60 mg by mouth 2 (two) times a day.  Takes two (60 mg) tabs by mouth nightly   Indications: Social Anxiety Disorder   Patient not taking: Reported on 7/10/2023   QUEtiapine (SEROquel) 50 mg tablet   Yes No   Sig: TAKE 1 TAB (50 MG) BY MOUTH 3 TIMES A DAY. (9 AM, 4 PM, AND AT BEDTIME)   acetaminophen (Acetaminophen Extra Strength) 500 mg tablet   No No   Sig: Take 1 tablet (500 mg total) by mouth every 6 (six) hours as needed for mild pain   Patient not taking: Reported on 7/17/2023   acetaminophen (TYLENOL) 650 mg CR tablet   No No   Sig: Take 1 tablet (650 mg total) by mouth every 8 (eight) hours as needed for mild pain   amLODIPine (NORVASC) 10 mg tablet   No No   Sig: TAKE 1 TABLET BY MOUTH EVERY DAY   aspirin (ECOTRIN LOW STRENGTH) 81 mg EC tablet   No No   Sig: Take 1 tablet (81 mg total) by mouth daily   atorvastatin (LIPITOR) 40 mg tablet   No No   Sig: TAKE 1 TABLET BY MOUTH EVERY DAY   benztropine (COGENTIN) 1 mg tablet   Yes No   Sig: Take 1 mg by mouth 2 (two) times a day   Patient not taking: Reported on 7/10/2023   busPIRone (BUSPAR) 10 mg tablet   Yes No   Sig: Take 20 mg by mouth 3 (three) times a day    Patient not taking: Reported on 7/10/2023   docusate sodium (COLACE) 100 mg capsule   No No   Sig: Take 1 capsule (100 mg total) by mouth every 12 (twelve) hours   ferrous sulfate 324 (65 Fe) mg   No No   Sig: Take 1 tablet (324 mg total) by mouth daily   folic acid (FOLVITE) 1 mg tablet   No No   Sig: TAKE 1 TABLET BY MOUTH EVERY DAY   hydrOXYzine HCL (ATARAX) 50 mg tablet   Yes No   Sig: Take 50 mg by mouth 3 (three) times a day as needed for itching   lidocaine (LMX) 4 % cream   No No   Sig: Apply topically as needed for mild pain   Patient not taking: Reported on 7/10/2023   lithium carbonate (LITHOBID) 300 mg CR tablet   No No   Sig: TAKE 1 TABLET BY MOUTH DAILY AT BEDTIME   Patient not taking: Reported on 7/10/2023   mirtazapine (REMERON) 7.5 MG tablet   No No   Sig: TAKE 1 TABLET BY MOUTH EVERY EVENING.   pantoprazole (PROTONIX) 20 mg tablet   Yes No   Sig: Take 20 mg by mouth daily   Patient not taking: Reported on 7/10/2023   polyethylene glycol (MIRALAX) 17 g packet   No No   Sig: Take 17 g by mouth daily   Patient not taking: Reported on 7/10/2023   prazosin (MINIPRESS) 2 mg capsule   Yes No   Sig: Take 2 mg by mouth daily at bedtime. Indications: Frightening Dreams   Patient not taking: Reported on 7/10/2023   pregabalin (LYRICA) 50 mg capsule   No No   Si capsule twice a day. May increase to 1 capsule 3 times per day if needed. Facility-Administered Medications: None     Allergies:  No Known Allergies     Review of Systems   Constitutional: Positive for fatigue. Negative for fever. Respiratory: Positive for shortness of breath. Cardiovascular: Positive for chest pain. Gastrointestinal: Positive for nausea. Neurological: Positive for numbness. All other systems reviewed and are negative. Physical Exam  ED Triage Vitals   Temperature Pulse Respirations Blood Pressure SpO2   07/18/23 2238 07/18/23 2156 07/18/23 2156 07/18/23 2156 07/18/23 2156   97.5 °F (36.4 °C) 87 16 (!) 164/104 100 %      Temp Source Heart Rate Source Patient Position - Orthostatic VS BP Location FiO2 (%)   07/18/23 2238 07/18/23 2156 07/18/23 2156 07/18/23 2156 --   Oral Monitor Lying Left arm       Pain Score       --                    Orthostatic Vital Signs  Vitals:    07/18/23 2300 07/18/23 2300 07/18/23 2315 07/18/23 2330   BP:  146/86 134/94 152/89   Pulse: 92 92 77 79   Patient Position - Orthostatic VS:  Lying Lying Lying       Physical Exam  Vitals and nursing note reviewed. Constitutional:       General: She is in acute distress (Agitated, anxious, yelling). HENT:      Head: Normocephalic. Mouth/Throat:      Mouth: Mucous membranes are moist.   Eyes:      Pupils: Pupils are equal, round, and reactive to light. Comments: Able to look side to side and track when talking with her, however if specifically asked to look to the sides when testing EOMI, patient will only stare straight ahead   Cardiovascular:      Rate and Rhythm: Normal rate and regular rhythm. Pulmonary:      Effort: No respiratory distress. Breath sounds: No wheezing, rhonchi or rales. Abdominal:      General: Abdomen is flat. There is no distension. Palpations: Abdomen is soft. Tenderness: There is no abdominal tenderness. There is no guarding or rebound. Musculoskeletal:      Cervical back: No rigidity. Skin:     General: Skin is warm and dry. Neurological:      Mental Status: She is alert. Comments: NIHSS 2  +1 dysarthria  +1 subjective numbness throughout entire left side    Difficult exam, limited cooperation, otherwise movement grossly symmetric. No facial droop.           ED Medications  Medications   iohexol (OMNIPAQUE) 350 MG/ML injection (SINGLE-DOSE) 100 mL (100 mL Intravenous Given 7/18/23 2226)   LORazepam (ATIVAN) injection 1 mg (1 mg Intravenous Given 7/18/23 2304)   clopidogrel (PLAVIX) tablet 300 mg (300 mg Oral Given 7/18/23 2340)   aspirin tablet 325 mg (325 mg Oral Given 7/18/23 2341)       Diagnostic Studies  Results Reviewed     Procedure Component Value Units Date/Time    FLU/RSV/COVID - if FLU/RSV clinically relevant [[id number]]  (Normal) Collected: 07/18/23 2257    Lab Status: Final result Specimen: Nares from Nose Updated: 07/18/23 2340     SARS-CoV-2 Negative     INFLUENZA A PCR Negative     INFLUENZA B PCR Negative     RSV PCR Negative    Narrative:      FOR PEDIATRIC PATIENTS - copy/paste COVID Guidelines URL to browser: [email or web address]    SARS-CoV-2 assay is a Nucleic Acid Amplification assay intended for the  qualitative detection of nucleic acid from SARS-CoV-2 in nasopharyngeal  swabs. Results are for the presumptive identification of SARS-CoV-2 RNA. Positive results are indicative of infection with SARS-CoV-2, the virus  causing COVID-19, but do not rule out bacterial infection or co-infection  with other viruses. Laboratories within the [facility] and its  territories are required to report all positive results to the appropriate  public health authorities. Negative results do not preclude SARS-CoV-2  infection and should not be used as the sole basis for treatment or other  patient management decisions. Negative results must be combined with  clinical observations, patient history, and epidemiological information. This test has not been FDA cleared or approved. This test has been authorized by FDA under an Emergency Use Authorization  (EUA).  This test is only authorized for the duration of time the  declaration that circumstances exist justifying the authorization of the  emergency use of an in vitro diagnostic tests for detection of SARS-CoV-2  virus and/or diagnosis of COVID-19 infection under section 564(b)(1) of  the Act, 21 U. S.C. 461QBQ-1(F)(7), unless the authorization is terminated  or revoked sooner. The test has been validated but independent review by FDA  and CLIA is pending. Test performed using [employer] Gene[employer]pert: This RT-PCR assay targets N2,  a region unique to SARS-CoV-2. A conserved region in the E-gene was chosen  for pan-Sarbecovirus detection which includes SARS-CoV-2. According to CMS-2020-01-R, this platform meets the definition of high-throughput technology.     Fingerstick Glucose (POCT) [[id number]]  (Normal) Collected: 07/18/23 2254    Lab Status: Final result Updated: 07/18/23 2255     POC Glucose 99 mg/dl     Basic metabolic panel [[id number]]  (Abnormal) Collected: 07/18/23 2217    Lab Status: Final result Specimen: Blood from Arm, Right Updated: 07/18/23 2253     Sodium 142 mmol/L      Potassium 3.1 mmol/L      Chloride 109 mmol/L      CO2 21 mmol/L      ANION GAP 12 mmol/L      BUN 5 mg/dL      Creatinine 0.88 mg/dL      Glucose 146 mg/dL      Calcium 8.8 mg/dL      eGFR 72 ml/min/1.73sq m     Narrative:      [place] guidelines for Chronic Kidney Disease (CKD):   •  Stage 1 with normal or high GFR (GFR > 90 mL/min/1.73 square meters)  •  Stage 2 Mild CKD (GFR = 60-89 mL/min/1.73 square meters)  •  Stage 3A Moderate CKD (GFR = 45-59 mL/min/1.73 square meters)  •  Stage 3B Moderate CKD (GFR = 30-44 mL/min/1.73 square meters)  •  Stage 4 Severe CKD (GFR = 15-29 mL/min/1.73 square meters)  •  Stage 5 End Stage CKD (GFR <15 mL/min/1.73 square meters)  Note: GFR calculation is accurate only with a steady state creatinine    HS Troponin 0hr (reflex protocol) [[id number]]  (Normal) Collected: 07/18/23 2217    Lab Status: Final result Specimen: Blood from Arm, Right Updated: 07/18/23 2247     hs TnI 0hr 2 ng/L     HS Troponin I 2hr [[id number]]     Lab Status: No result Specimen: Blood     Protime-INR [[id number]]  (Normal) Collected: 07/18/23 2217    Lab Status: Final result Specimen: Blood from Arm, Right Updated: 07/18/23 2238     Protime 12.6 seconds      INR 0.95    APTT [[id number]]  (Normal) Collected: 07/18/23 2217    Lab Status: Final result Specimen: Blood from Arm, Right Updated: 07/18/23 2238     PTT 26 seconds     CBC and Platelet [[id number]]  (Abnormal) Collected: 07/18/23 2217    Lab Status: Final result Specimen: Blood from Arm, Right Updated: 07/18/23 2223     WBC 3.84 Thousand/uL      RBC 4.75 Million/uL      Hemoglobin 14.0 g/dL      Hematocrit 41.9 %      MCV 88 fL      MCH 29.5 pg      MCHC 33.4 g/dL      RDW 13.9 %      Platelets 128 Thousands/uL      MPV 9.5 fL     Fingerstick Glucose (POCT) [[id number]]  (Normal) Collected: 07/18/23 2152    Lab Status: Final result Updated: 07/18/23 2154     POC Glucose 140 mg/dl                  CTA stroke alert (head/neck)   Final Result by Fredy Givens MD (07/18 2251)      No stenosis, dissection or occlusion of the carotid or vertebral arteries or major vessels of the [place]. Findings were directly discussed with Hakeem Fregoso at  10:49 PM  .                           Workstation performed: [id number]         CT stroke alert brain   Final Result by Fredy Givens MD (07/18 2251)      No acute evidence of acute vascular territorial infarction, intracranial hemorrhage or mass. Findings were directly discussed with Hakeem Fregoso at  10:30 PM  .      Workstation performed: [id number]         X-ray chest 1 view portable    (Results Pending)         Procedures  Procedures      ED Course                                       [facility]  Samantha Linares is a 59 y.o. female with PMH CVA who presents to the emergency department with dysarthria and subjective left-sided numbness. Stroke alert called, workup including vital signs, physical exam, EKG, CXR, labs and CT.   EKG without acute ischemic changes, CXR without acute cardiopulmonary abnormalities. CTA/CTH without acute abnormalities, case discussed with neurology, plan for aspirin/Plavix load and admission to medicine under stroke pathway. Per chart review patient with prior visits with panic attacks with subjective neurologic complaints that appear to have improved after Ativan, given 1 mg IV Ativan in ER with some subjective improvement, still with mild dysarthria. Plan for admission to Medicine. Disposition  Final diagnoses:   Stroke-like symptoms     Time reflects when diagnosis was documented in both MDM as applicable and the Disposition within this note     Time User Action Codes Description Comment    7/18/2023 10:14 PM Jessica Rendon Balloon Add [R29.90] Stroke-like symptoms       ED Disposition     ED Disposition   Admit    Condition   Stable    Date/Time   Tue Jul 18, 2023 11:41 PM    Comment   Case was discussed with internal medicine and neurology and the patient's admission status was agreed to be Admission Status: observation status to the service of Dr. Julio Livingston . Follow-up Information    None         Patient's Medications   Discharge Prescriptions    No medications on file       No discharge procedures on file. PDMP Review       Value Time User    PDMP Reviewed  Yes 7/10/2023  8:29 AM Barbra Jaffe, [address] [place] [address]           ED Provider  Attending physically available and evaluated Samantha okeefe. I managed the patient along with the ED Attending. Electronically Signed by    Portions of the record may have been created with voice recognition software. Occasional wrong word or "sound a like" substitutions may have occurred due to the inherent limitations of voice recognition software.   Read the chart carefully and recognize, using context, where substitutions have occurred     Shy Ha MD  07/18/23 7043

## 2023-07-20 ENCOUNTER — APPOINTMENT (OUTPATIENT)
Dept: PHYSICAL THERAPY | Facility: CLINIC | Age: 58
End: 2023-07-20
Payer: MEDICARE

## 2023-07-20 VITALS
DIASTOLIC BLOOD PRESSURE: 72 MMHG | SYSTOLIC BLOOD PRESSURE: 113 MMHG | HEART RATE: 56 BPM | HEIGHT: 61 IN | WEIGHT: 168 LBS | OXYGEN SATURATION: 99 % | RESPIRATION RATE: 18 BRPM | BODY MASS INDEX: 31.72 KG/M2 | TEMPERATURE: 96.9 F

## 2023-07-20 PROBLEM — R20.0 NUMBNESS: Status: ACTIVE | Noted: 2023-07-20

## 2023-07-20 PROCEDURE — 99239 HOSP IP/OBS DSCHRG MGMT >30: CPT | Performed by: INTERNAL MEDICINE

## 2023-07-20 RX ORDER — METHOCARBAMOL 500 MG/1
500 TABLET, FILM COATED ORAL EVERY 6 HOURS PRN
Status: DISCONTINUED | OUTPATIENT
Start: 2023-07-20 | End: 2023-07-20 | Stop reason: HOSPADM

## 2023-07-20 RX ORDER — METHOCARBAMOL 500 MG/1
500 TABLET, FILM COATED ORAL 2 TIMES DAILY PRN
Qty: 10 TABLET | Refills: 0 | Status: SHIPPED | OUTPATIENT
Start: 2023-07-20

## 2023-07-20 RX ORDER — CLONAZEPAM 0.5 MG/1
0.5 TABLET ORAL 2 TIMES DAILY
Qty: 60 TABLET | Refills: 0 | Status: SHIPPED | OUTPATIENT
Start: 2023-07-20 | End: 2023-08-19

## 2023-07-20 RX ORDER — MIRTAZAPINE 15 MG/1
15 TABLET, FILM COATED ORAL
Qty: 30 TABLET | Refills: 0 | Status: SHIPPED | OUTPATIENT
Start: 2023-07-20 | End: 2023-08-19

## 2023-07-20 RX ORDER — QUETIAPINE FUMARATE 50 MG/1
50 TABLET, FILM COATED ORAL
Qty: 120 TABLET | Refills: 0 | Status: SHIPPED | OUTPATIENT
Start: 2023-07-20 | End: 2023-08-19

## 2023-07-20 RX ORDER — LORAZEPAM 1 MG/1
0.5 TABLET ORAL EVERY 6 HOURS PRN
Qty: 30 TABLET | Refills: 0 | Status: SHIPPED | OUTPATIENT
Start: 2023-07-20 | End: 2023-08-04

## 2023-07-20 RX ORDER — POTASSIUM CHLORIDE 20 MEQ/1
40 TABLET, EXTENDED RELEASE ORAL 2 TIMES DAILY
Status: DISCONTINUED | OUTPATIENT
Start: 2023-07-20 | End: 2023-07-20 | Stop reason: HOSPADM

## 2023-07-20 RX ADMIN — ACETAMINOPHEN 325MG 650 MG: 325 TABLET ORAL at 02:10

## 2023-07-20 RX ADMIN — CLONAZEPAM 0.5 MG: 0.5 TABLET ORAL at 08:10

## 2023-07-20 RX ADMIN — POTASSIUM CHLORIDE 40 MEQ: 1500 TABLET, EXTENDED RELEASE ORAL at 08:43

## 2023-07-20 RX ADMIN — VALBENAZINE 40 MG: 40 CAPSULE ORAL at 08:11

## 2023-07-20 RX ADMIN — FOLIC ACID 1000 MCG: 1 TABLET ORAL at 08:10

## 2023-07-20 RX ADMIN — QUETIAPINE FUMARATE 50 MG: 25 TABLET ORAL at 08:10

## 2023-07-20 RX ADMIN — METHOCARBAMOL 500 MG: 500 TABLET ORAL at 11:14

## 2023-07-20 RX ADMIN — QUETIAPINE FUMARATE 50 MG: 25 TABLET ORAL at 11:14

## 2023-07-20 RX ADMIN — ASPIRIN 81 MG 81 MG: 81 TABLET ORAL at 08:10

## 2023-07-20 RX ADMIN — PREGABALIN 50 MG: 50 CAPSULE ORAL at 08:16

## 2023-07-20 RX ADMIN — DOCUSATE SODIUM 100 MG: 100 CAPSULE, LIQUID FILLED ORAL at 08:16

## 2023-07-20 RX ADMIN — POLYETHYLENE GLYCOL 3350 17 G: 17 POWDER, FOR SOLUTION ORAL at 08:10

## 2023-07-20 NOTE — UTILIZATION REVIEW
Notification of Unplanned, Urgent, or   Emergency Inpatient Admission   [address]  [facility], [address] [facility]  Tax ID: [id number]  NPI: [id number]  Place of Service: [address] [facility]  Admission Level of Care: Inpatient  Place of Service Code: 21     Attending Physician Information  Attending Name and NPI#: Blaine Garcia, [address] [[id number]]  Phone: [phone]     Admission Information  Inpatient Admission Date/Time: 7/19/23 12:49 PM  Discharge Date/Time: No discharge date for patient encounter. Admitting Diagnosis Code/Description:  Flank pain [R10.9]  Stroke-like symptoms [R29.90]     Utilization Review Contact  Francis Zuluaga Utilization   Phone: [phone]  Fax: [phone]  Email: [email or web address]  Contact for approvals/pending authorizations, clinical reviews, and discharge. Physician Advisory Services Contact  Medical Necessity Denial & [id number] Discussion  Phone: [phone]  Fax: [phone]  Email: [email or web address]

## 2023-07-20 NOTE — CASE MANAGEMENT
Case Management Discharge Planning Note    Patient name Lakisha Ramp  Location [address]  966 6486-* MRN [id number]  : 1965 Date 2023       Current Admission Date: 2023  Current Admission Diagnosis:Numbness   Patient Active Problem List    Diagnosis Date Noted   • Numbness 2023   • Stroke-like episode 2023   • Witnessed seizure-like activity ([address]) 2023   • Fall 2023   • Memory loss 2023   • Hemiplegia, post-stroke ([address]) 2022   • Anemia 2022   • Hypokalemia 2022   • Constipation 03/15/2022   • CVA (cerebral vascular accident) ([address]) 2022   • Mixed hyperlipidemia 2021   • History of CVA (cerebrovascular accident) 2021   • Encephalopathy 2021   • Dysphagia 2020   • Ambulatory dysfunction 2020   • Status post insertion of spinal cord stimulator 2020   • Tardive dyskinesia 2020   • MIMI (obstructive sleep apnea)    • Post laminectomy syndrome 10/07/2019   • Spinal stenosis of lumbar region with neurogenic claudication 2018   • Lumbar radiculopathy 2017   • Chronic pain disorder 2017   • Lumbar spondylosis 10/31/2016   • Generalized anxiety disorder 10/26/2016   • Major depressive disorder, recurrent episode, moderate degree ([address]) 2016   • Bipolar disorder ([address]) 2015   • Panic disorder without agoraphobia 2015   • Post traumatic stress disorder 2015   • Tremor 2014   • Migraine 2014   • Esophageal reflux 2014   • Cognitive disorder 2014   • Overactive bladder 2013   • Insomnia 2013   • Urinary incontinence 2012   • Vitamin D deficiency 2012   • Fibromyalgia 2012   • Essential hypertension 2012      LOS (days): 1  Geometric Mean LOS (GMLOS) (days): 2.90  Days to GMLOS:1.8     OBJECTIVE:  Risk of Unplanned Readmission Score: 21.93         Current admission status: Inpatient   Preferred Pharmacy: [id number] SHAKILA Abbott - [address] [facility]  Phone: [phone] Fax: [phone]    [id number] [place], [facility] [address]  Phone: [phone] Fax: [phone]    61 [facility], 10 42 [facility] 6135 [facility]  [address] [facility]  [address] [facility]  [place] [address]  Phone: [phone] Fax: [phone] [address], 10 42 [facility] [place] [address]  Phone: [phone] Fax: [phone] [facility]  [address] [facility]  171 [facility] [address]  Phone: [phone] Fax: [phone] 600 [facility] 3700 [facility],  37[address] [facility],  [address] [place] [address] [facility]  Phone: [phone] Fax: [phone]    Primary Care Provider: Eddi Irene DO    Primary Insurance: [facility] HOSPITAL REP  Secondary Insurance: [facility] DETAILS:     Additional Comments: CM spoke with pt's daughter who requested a Lyft be arranged for pt. Lyft ordered for 3:00 PM pickup, RN added to request to be notified when the [address] [facility] [address] arrives.

## 2023-07-20 NOTE — ASSESSMENT & PLAN NOTE
Current episode with panic attack/psych related vs acute TIA/CVA  Seen by neurology with low suspicion for ischemic event  Seen in consultation by psychiatry and medications were adjusted  Seroquel was increased to 50 mg 4 times daily  Patient was also started on longer acting Klonopin 0.5 mg twice daily  Instructed to use Ativan 1 mg as needed  Her Remeron was increased to 15 mg at bedtime  Needs ongoing outpatient follow-up with psychiatry/psychotherapy counseling/outpatient   The above was discussed in detail with patient's daughter via telephone

## 2023-07-20 NOTE — NURSING NOTE
Patient has been discharged, instructions reviewed and expressed understanding. Home medications retrieved from pharmacy, all belongings reviewed and returned. Accompany to lobby by PCA, uber awaiting to transport.

## 2023-07-20 NOTE — UTILIZATION REVIEW
Notification of Unplanned, Urgent, or   Emergency Inpatient Admission   [address] [facility], [facility]  Tax ID: [id number]  NPI: [id number]  [facility] [address] [facility]   Admission Level of Care: Inpatient  Place of Service Code: 21     Attending Physician Information  Attending Name and NPI#: Blaine Garcia, [place] [[id number]]  Phone: [phone]     Admission Information  Inpatient Admission Date/Time: 7/19/23 12:49 PM  Discharge Date/Time: No discharge date for patient encounter. Admitting Diagnosis Code/Description:  Flank pain [R10.9]  Stroke-like symptoms [R29.90]     Utilization Review Contact  Francis Zuluaga, Utilization   Phone: [phone]  Fax: [phone]  Email: [email or web address]  Contact for approvals/pending authorizations, clinical reviews, and discharge. Physician Advisory Services Contact  Medical Necessity Denial & Csej-qe-Edvo Discussion  Phone: [phone]  Fax: [phone]  Email: [email or web address]

## 2023-07-20 NOTE — PLAN OF CARE
Problem: Neurological Deficit  Goal: Neurological status is stable or improving  Description: Interventions:  - Monitor and assess patient's level of consciousness, motor function, sensory function, and level of assistance needed for ADLs. - Monitor and report changes from baseline. Collaborate with interdisciplinary team to initiate plan and implement interventions as ordered. - Provide and maintain a safe environment. - Consider seizure precautions. - Consider fall precautions. - Consider aspiration precautions. - Consider bleeding precautions.   7/20/2023 1439 by Miki Nation RN  Outcome: Adequate for Discharge  7/20/2023 1031 by Miki Nation RN  Outcome: Progressing

## 2023-07-20 NOTE — DISCHARGE SUMMARY
[address] [facility]  Discharge- Samantha Gilliam 1965, 59 y.o. female MRN: [id number]  Unit/Bed#: E4 -01 Encounter: [id number]  Primary Care Provider: Walker Pitts DO   Date and time admitted to hospital: 7/18/2023  9:47 PM    * Numbness  Assessment & Plan  Presented for reported R sided facial droop, L hemibody decreased sensation/numbness, and slurred speech. On admission after receiving ativan her symptoms reportedly improved. She was very anxious, tearful and tangential in speech  CTH/CTA without evidence of acute intracranial abnormalities  Seen and evaluated by neurology  Suspected symptoms to be secondary to acute panic attack rather than cerebral ischemia  Unable to get MRI secondary to spinal stimulator  Neurology recommended continuing aspirin and statin and no additional inpatient neuro work-up  Seen and evaluated by psychiatry who had adjusted medications  Patient's symptoms have resolved at this time  Needs ongoing outpt followup with psych    Panic disorder without agoraphobia  Assessment & Plan  Current episode with panic attack/psych related vs acute TIA/CVA  Seen by neurology with low suspicion for ischemic event  Seen in consultation by psychiatry and medications were adjusted  Seroquel was increased to 50 mg 4 times daily  Patient was also started on longer acting Klonopin 0.5 mg twice daily  Instructed to use Ativan 1 mg as needed  Her Remeron was increased to 15 mg at bedtime  Needs ongoing outpatient follow-up with psychiatry/psychotherapy counseling/outpatient   The above was discussed in detail with patient's daughter via telephone    Hypokalemia  Assessment & Plan  Potassium noted to be 3.1.   This was repleted    Tardive dyskinesia  Assessment & Plan  Noted on exam  Maintained on valbenzine    Essential hypertension  Assessment & Plan  Home regimen amlodipine 10 mg daily      Consultations During Hospital Stay:  · Psychiatry · Neurology    Procedures Performed:   X-ray chest 1 view portable  Result Date: 7/19/2023  Impression: No acute cardiopulmonary disease. Findings are stable Workstation performed: [id number]     CT stroke alert brain  Result Date: 7/18/2023  Impression: No acute evidence of acute vascular territorial infarction, intracranial hemorrhage or mass. Findings were directly discussed with Gabe Delgadillo at  10:30 PM  . Workstation performed: [id number]     CTA stroke alert (head/neck)  Result Date: 7/18/2023  · Impression: No stenosis, dissection or occlusion of the carotid or vertebral arteries or major vessels of the [place]. Findings were directly discussed with Gabe Delgadillo at  10:49 PM  . Workstation performed: [id number]     Significant Findings / Test Results:   · Panic attack/disorder    Incidental Findings:   · None    Test Results Pending at Discharge (will require follow up): · None     Outpatient Tests Requested:  · PCP-1 week  · Psychiatry/psychologist- routine follow-up    Complications:  none    Hospital Course:     Samantha Schultz is a 59 y.o. female patient with a past medical history of CVA, hypertension, hyperlipidemia, bipolar, anxiety, tardive dyskinesia, cognitive impairment, chronic pain. She originally presented to the hospital on 7/18/2023 due to complaints of right-sided facial droop as well as left-sided numbness/tingling tingling and slurring of words. Upon evaluation in the ED a stroke alert was called and she had CT head imaging. She was seen and evaluated by neurology. She was unable to get an MRI performed given her history of spinal stimulator. She was very anxious and tearful appearing on exam and symptoms improved after administration of Ativan. Neurology felt suspicion for acute stroke was low given her presentation and symptoms appear to be more functional/anxiety related given clinical picture.   Neurology instructed her to continue aspirin and statin but there was no need for plavix. No further need for inpatient additional neuro workup. She was seen in consultation by psychiatry who increased her psychiatric regimen. Her Seroquel was increased to 50 mg 4 times daily, Remeron increased to 50 mg nightly, Klonopin 0.5 mg twice daily was added for longer acting coverage. She was instructed to use low-dose Ativan in the state of acute attack. All medication changes were discussed with daughter via telephone. Patient will be discharged to live at home with daughter until additional living situation can be arranged which her outpatient  is working on. She will need ongoing psych followup as well as psychotherapy. In conclusion, patient is stable for discharge at this time. Condition at Discharge: stable     Discharge Day Visit / Exam:     Subjective: Resting in bed sleeping upon arrival.  Feels better today. Numbness is resolving. Discussed medication changes. Hoping she can get into facility/group home to live in the near future rather than living with family. Vitals: Blood Pressure: 113/72 (07/20/23 0713)  Pulse: 56 (07/20/23 0713)  Temperature: (!) 96.9 °F (36.1 °C) (07/20/23 0713)  Temp Source: Temporal (07/20/23 0713)  Respirations: 18 (07/20/23 0713)  Height: 5' 1" (154.9 cm) (07/19/23 0929)  Weight - Scale: 76.2 kg (168 lb) (07/19/23 0929)  SpO2: 99 % (07/20/23 0713)     Exam:   Physical Exam  Vitals and nursing note reviewed. Constitutional:       General: She is not in acute distress. Appearance: She is not ill-appearing, toxic-appearing or diaphoretic. Comments: Tardive dyskinesia   HENT:      Head: Normocephalic and atraumatic. Eyes:      General: No scleral icterus. Cardiovascular:      Rate and Rhythm: Normal rate and regular rhythm. Pulmonary:      Effort: Pulmonary effort is normal. No respiratory distress. Breath sounds: Normal breath sounds. No stridor. No wheezing or rhonchi.    Abdominal:      General: Bowel sounds are normal. There is no distension. Palpations: Abdomen is soft. There is no mass. Tenderness: There is no abdominal tenderness. Hernia: No hernia is present. Musculoskeletal:         General: No swelling. Cervical back: Neck supple. Skin:     General: Skin is warm and dry. Neurological:      Mental Status: She is oriented to person, place, and time. Mental status is at baseline. Psychiatric:         Mood and Affect: Mood normal.         Behavior: Behavior normal.         Discussion with Family: daughter via telephone    Discharge instructions/Information to patient and family:   See after visit summary for information provided to patient and family. Provisions for Follow-Up Care:  See after visit summary for information related to follow-up care and any pertinent home health orders. Planned Readmission: no     Discharge Statement:  I spent 50 minutes discharging the patient. This time was spent on the day of discharge. I had direct contact with the patient on the day of discharge. Greater than 50% of the total time was spent examining patient, answering all patient questions, arranging and discussing plan of care with patient as well as directly providing post-discharge instructions. Additional time then spent on discharge activities. Discharge Medications:  See after visit summary for reconciled discharge medications provided to patient and family.       ** Please Note: This note has been constructed using a voice recognition system **

## 2023-07-20 NOTE — ASSESSMENT & PLAN NOTE
Presented for reported R sided facial droop, L hemibody decreased sensation/numbness, and slurred speech. On admission after receiving ativan her symptoms reportedly improved.    She was very anxious, tearful and tangential in speech  CTH/CTA without evidence of acute intracranial abnormalities  Seen and evaluated by neurology  Suspected symptoms to be secondary to acute panic attack rather than cerebral ischemia  Unable to get MRI secondary to spinal stimulator  Neurology recommended continuing aspirin and statin and no additional inpatient neuro work-up  Seen and evaluated by psychiatry who had adjusted medications  Patient's symptoms have resolved at this time  Needs ongoing outpt followup with psych

## 2023-07-21 ENCOUNTER — TRANSITIONAL CARE MANAGEMENT (OUTPATIENT)
Dept: INTERNAL MEDICINE CLINIC | Facility: CLINIC | Age: 58
End: 2023-07-21

## 2023-07-21 NOTE — UTILIZATION REVIEW
NOTIFICATION OF ADMISSION DISCHARGE   This is a Notification of Discharge from [facility]. Please be advised that this patient has been discharge from our facility. Below you will find the admission and discharge date and time including the patient’s disposition. UTILIZATION REVIEW CONTACT:  Meryle Hurry  Utilization   Network Utilization Review Department  Phone: [phone] x carefully listen to the prompts. All voicemails are confidential.  Email: [email or web address]     ADMISSION INFORMATION  PRESENTATION DATE: 7/18/2023  9:47 PM  OBERVATION ADMISSION DATE:   INPATIENT ADMISSION DATE: 7/19/23 12:49 PM   DISCHARGE DATE: 7/20/2023  3:33 PM   DISPOSITION:Home/Self Care    IMPORTANT INFORMATION:  Send all requests for admission clinical reviews, approved or denied determinations and any other requests to dedicated fax number below belonging to the campus where the patient is receiving treatment.  List of dedicated fax numbers:  [place] DENIALS (Administrative/Medical Necessity) [phone] 230[address] [facility] (Maternity/NICU/Pediatrics) [phone]   [facility] [phone]   [facility] [phone] 1636 [facility] [phone]   [address] [place] [address] [phone]   [facility] [phone]   [address] [place] [address] [address] [place] [address] [phone] 506 [facility] [phone] 3441 [facility] [phone] 2720 [facility] 3000 32[facility] [phone]

## 2023-07-24 ENCOUNTER — OFFICE VISIT (OUTPATIENT)
Dept: PHYSICAL THERAPY | Facility: CLINIC | Age: 58
End: 2023-07-24
Payer: MEDICARE

## 2023-07-24 DIAGNOSIS — Z96.653 STATUS POST BILATERAL KNEE REPLACEMENTS: ICD-10-CM

## 2023-07-24 DIAGNOSIS — Z96.653 TOTAL KNEE REPLACEMENT STATUS, BILATERAL: Primary | ICD-10-CM

## 2023-07-24 PROCEDURE — 97112 NEUROMUSCULAR REEDUCATION: CPT | Performed by: PHYSICAL THERAPIST

## 2023-07-24 PROCEDURE — 97110 THERAPEUTIC EXERCISES: CPT | Performed by: PHYSICAL THERAPIST

## 2023-07-24 NOTE — PROGRESS NOTES
Daily Note     Today's date: 2023  Patient name: Mike Dawn  : 1965  MRN: [id number]  Referring provider: David Calhoun  Dx:   Encounter Diagnosis     ICD-10-CM    1. Total knee replacement status, bilateral  Z96.653       2. Status post bilateral knee replacements  Z96.653           Start Time: 0900  Stop Time: 0940  Total time in clinic (min): 40 minutes    Subjective: Patient reports that she in the hospital last week because she was experiencing panic attacks. Her medication was changed, and she reports significant improvement in sx. Objective: See treatment diary below      Assessment: Patient demonstrated good tolerance to PT and was able to resume standing exercises today with no aggravation of sx reported. Patient requires Sha Ham in order to maintain stability with step ups with decreased terminal knee extension demonstrated during activity. This strength deficit addressed with initiation of SL leg press. Progress LE strengthening/balance, as able. Plan: Continue per plan of care.       Precautions: s/p TKA (R TKA 3/9/22, L TKA 22)    Date    Visit # IE 2 3 4 5 6 7 8 9    FOTO IE             Re-eval IE              Manuals    B/l knee PROM   NS flexion extension nv PK PK Ext PK ext HY nv nt   Hamstring stretch       PK HY                               Neuro Re-Ed     Quad sets 3x5" ea seated on chair 10x3" ea supine 10x 10" hold ea side 20x3" sup ea  supine 10x 10" hold ea side       clamshells 10x OTB 20x PTB 2x10 ptb   np       LAQ 10x ea 15x3" ea 15x ea 5" hold 2x10 5" ea 2x10 5" ea 1# 2x10 5" ea 1# 2x10 5" ea 1# 2x10 5" ea 2# 2x10 5" ea 2# 2x10 5" ea 2#   SAQ  15x3" ea 2x10 ea side 2x10 ea 2x10 5" ea 1# np  2x10 5" ea 2# 2x10 5" ea 2# 2x10 5" ea 2#   TKE             bridges  nv 2x10 2x**         Weight shifts  15x s/s SLS             Ther Ex 6/8 6/14 6/16 6/21 6/23 6/28 6/30 7/12 7/17    bike  6' 6' 6' 6' 6' 6' 6' 6' 6'   Leg press       65# 20x 75# 20x 75# 20x 75# 20x    SL leg press          45# 20x ea   Seated march 5x ea 20x  20x ea  np Stand1# 15x ea  2# 2x10 2x10 2# 2x10 2#   Glut sets     5" x 15 5" x 15  15x5" 15x5" 15x5"   Hamstring curl stand  nv Sitting ptb 2x10  PTB  15x  GTB       Standing hip abd/ext  10x ea  nv  1# 15x ea 1# 10x2 ea 2# 2x10 ea nv 10x ea GTB (use OTB nv)    Hamstring stretch 2x20" ea sit 3x20" ea sit           Supine heel slide  10x10" ea 15x 5" hold ea side 20x5" ea 20x5" ea        SLR  10x ea 10x ea side 2x10 ea  15x ea   2x10 2x10   Side steps      2 laps       Ther Activity 6/8 6/14 6/16 6/21 6/23 6/28 6/30 7/12 7/17 7/24   Sit to stands  2x10 2x10 2x10         Pt edu   NS DK  PK PK HY HY    Step ups  10x ea 0R    10x 1R 10x2 1R 10x2 1R nv 20x ea 1 R   Gait Training 6/8 6/14 6/16 6/21 6/23 6/28 6/30 7/12 7/17 7/24   Walking with walker  3 laps  2 laps         Walking with cane  2x10'    3 x 15' 3 x 15' 3x15' nv    Obstacle course with walker                          Modalities 6/8 6/16 6/23 6/28 6/30 7/12 7/17 7/24

## 2023-07-25 ENCOUNTER — PATIENT OUTREACH (OUTPATIENT)
Dept: INTERNAL MEDICINE CLINIC | Facility: CLINIC | Age: 58
End: 2023-07-25

## 2023-07-25 NOTE — PROGRESS NOTES
Outpatient Care Management Note:    Received ADT alert that patient was at [facility] from 7/18-7/20/23 for stroke alert and neurology was consulted and felt her presentation and symptoms appeared to be more functional/anxiety related. Psychiatry was consulted and they increased her regimen. Seroquel increased to 50 mg 4 times a day   Remeron increased to 50 mg at night   Klonopin 0.5 mg twice daily was added for longer acting coverage   Ativan 0.5 mg as needed     Patient to follow up closely with outpatient mental health provider. I called patient and no answer and message left requesting a call back and I stated reason for call. Per chart review patient is now attending outpatient physical therapy. Patient due for routine follow up with PCP office.

## 2023-07-26 ENCOUNTER — OFFICE VISIT (OUTPATIENT)
Dept: PHYSICAL THERAPY | Facility: CLINIC | Age: 58
End: 2023-07-26
Payer: MEDICARE

## 2023-07-26 DIAGNOSIS — Z96.653 TOTAL KNEE REPLACEMENT STATUS, BILATERAL: Primary | ICD-10-CM

## 2023-07-26 DIAGNOSIS — Z96.653 STATUS POST BILATERAL KNEE REPLACEMENTS: ICD-10-CM

## 2023-07-26 PROCEDURE — 97110 THERAPEUTIC EXERCISES: CPT

## 2023-07-26 PROCEDURE — 97112 NEUROMUSCULAR REEDUCATION: CPT

## 2023-07-26 NOTE — PROGRESS NOTES
Daily Note     Today's date: 2023  Patient name: Jose Wasserman  : 1965  MRN: [id number]  Referring provider: Luz Marina Lockhart  Dx:   Encounter Diagnosis     ICD-10-CM    1. Total knee replacement status, bilateral  Z96.653       2. Status post bilateral knee replacements  Z96.653                      Subjective: Pt presents to PT reporting "I feel better". Pt denies increased pain post PT session. Objective: See treatment diary below      Assessment: Pt demonstrates fair to good tolerance to progressions depending on TE. Pt able to perform increased weight with SAQ requiring cuing for L knee extension but demonstrates difficulty with full R knee extension. Noted pliability with manual therapy. Noted quad lag with SLR with R leg. Pt demonstrates difficulty with ambulating with SPC secondary to poor quad strength and LOB that was self corrected. Patient demonstrated fatigue post treatment, exhibited good technique with therapeutic exercises and would benefit from continued PT to increase flexibility, strength and function. Plan: Continue per plan of care.       Precautions: s/p TKA (R TKA 3/9/22, L TKA 22)    Date    Visit # 11     6 7 8 9 10   FOTO             Re-eval               Manuals    B/l knee PROM R PK     PK Ext PK ext HY nv nt   Hamstring stretch       PK HY                               Neuro Re-Ed     Quad sets      supine 10x 10" hold ea side       clamshells      np       LAQ      2x10 5" ea 1# 2x10 5" ea 1# 2x10 5" ea 2# 2x10 5" ea 2# 2x10 5" ea 2#   SAQ 2x10 5" ea 2#     np  2x10 5" ea 2# 2x10 5" ea 2# 2x10 5" ea 2#   TKE             bridges 10x            Weight shifts             SLS             Ther Ex     bike 6'     6' 6' 6' 6' 6'   Leg press 75# 20x       65# 20x 75# 20x 75# 20x 75# 20x    SL leg press 45# 20x          45# 20x ea Seated march 2x10 3#     np Stand1# 15x ea  2# 2x10 2x10 2# 2x10 2#   Glut sets 15x5"     5" x 15  15x5" 15x5" 15x5"   Hamstring curl stand      15x  GTB       Standing hip abd/ext 10x2 ea OTB     1# 15x ea 1# 10x2 ea 2# 2x10 ea nv 10x ea GTB (use OTB nv)    Hamstring stretch             Supine heel slide             SLR  x 10     15x ea   2x10 2x10   Side steps      2 laps       Ther Activity 7/26 6/28 6/30 7/12 7/17 7/24   Sit to stands             Pt edu      PK PK HY HY    Step ups      10x 1R 10x2 1R 10x2 1R nv 20x ea 1 R   Gait Training 7/26 6/28 6/30 7/12 7/17 7/24   Walking with walker             Walking with cane      3 x 15' 3 x 15' 3x15' nv    Obstacle course with walker                          Modalities 7/26 6/28 6/30 7/12 7/17 7/24

## 2023-07-31 ENCOUNTER — OFFICE VISIT (OUTPATIENT)
Dept: PHYSICAL THERAPY | Facility: CLINIC | Age: 58
End: 2023-07-31
Payer: MEDICARE

## 2023-07-31 DIAGNOSIS — Z96.653 STATUS POST BILATERAL KNEE REPLACEMENTS: ICD-10-CM

## 2023-07-31 DIAGNOSIS — Z96.653 TOTAL KNEE REPLACEMENT STATUS, BILATERAL: Primary | ICD-10-CM

## 2023-07-31 PROCEDURE — 97110 THERAPEUTIC EXERCISES: CPT

## 2023-07-31 NOTE — PROGRESS NOTES
Daily Note     Today's date: 2023  Patient name: Aissatou Olsen  : 1965  MRN: [id number]  Referring provider: Kimberley Mcmahon  Dx:   Encounter Diagnosis     ICD-10-CM    1. Total knee replacement status, bilateral  Z96.653       2. Status post bilateral knee replacements  Z96.653           Start Time: 0830  Stop Time: 0900  Total time in clinic (min): 30 minutes    Subjective: Pt presents to PT reporting she has another appointment today and has to end today's session early. Pt states she has noticed she has been feeling better and has been loosing weight from doing her exercises. Objective: See treatment diary below      Assessment: Pt continues to work towards goals of increased knee ROM and strength. Pt performed exercises as noted within time constraints, continue full session next visit. Patient demonstrated fatigue post treatment, exhibited good technique with therapeutic exercises and would benefit from continued PT to increase flexibility, strength and function. Continue to progress as able. Plan: Continue per plan of care.       Precautions: s/p TKA (R TKA 3/9/22, L TKA 22)    Date    Visit # 11 12    6 7 8 9 10   FOTO             Re-eval               Manuals    B/l knee PROM R PK     PK Ext PK ext HY nv nt   Hamstring stretch       PK HY                               Neuro Re-Ed     Quad sets      supine 10x 10" hold ea side       clamshells      np       LAQ  2x10 3#    2x10 5" ea 1# 2x10 5" ea 1# 2x10 5" ea 2# 2x10 5" ea 2# 2x10 5" ea 2#   SAQ 2x10 5" ea 2# 2x10 3#    np  2x10 5" ea 2# 2x10 5" ea 2# 2x10 5" ea 2#   TKE             bridges 10x            Weight shifts             SLS             Ther Ex     bike 6' 6'    6' 6' 6' 6' 6'   Leg press 75# 20x  95# 2x10     65# 20x 75# 20x 75# 20x 75# 20x    SL leg press 45# 20x  45# 20x         45# 20x ea   Seated march 2x10 3# 2x10 3#    np Stand1# 15x ea  2# 2x10 2x10 2# 2x10 2#   Glut sets 15x5"     5" x 15  15x5" 15x5" 15x5"   Hamstring curl stand      15x  GTB       Standing hip abd/ext 10x2 ea OTB 10x2 ea OTB    1# 15x ea 1# 10x2 ea 2# 2x10 ea nv 10x ea GTB (use OTB nv)    Hamstring stretch             Supine heel slide             SLR  x 10     15x ea   2x10 2x10   Side steps      2 laps       Ther Activity 7/26 7/31 6/28 6/30 7/12 7/17 7/24   Sit to stands             Pt edu      PK PK HY HY    Step ups      10x 1R 10x2 1R 10x2 1R nv 20x ea 1 R   Gait Training 7/26 7/31 6/28 6/30 7/12 7/17 7/24   Walking with walker             Walking with cane      3 x 15' 3 x 15' 3x15' nv    Obstacle course with walker                          Modalities 7/26 7/31 6/28 6/30 7/12 7/17 7/24

## 2023-08-02 ENCOUNTER — OFFICE VISIT (OUTPATIENT)
Dept: PHYSICAL THERAPY | Facility: CLINIC | Age: 58
End: 2023-08-02
Payer: MEDICARE

## 2023-08-02 DIAGNOSIS — Z96.653 TOTAL KNEE REPLACEMENT STATUS, BILATERAL: Primary | ICD-10-CM

## 2023-08-02 DIAGNOSIS — Z96.653 STATUS POST BILATERAL KNEE REPLACEMENTS: ICD-10-CM

## 2023-08-02 PROCEDURE — 97112 NEUROMUSCULAR REEDUCATION: CPT

## 2023-08-02 PROCEDURE — 97164 PT RE-EVAL EST PLAN CARE: CPT

## 2023-08-02 NOTE — PROGRESS NOTES
Re-evaluation     Today's date: 2023  Patient name: Wandy Posada  : 1965  MRN: [id number]  Referring provider: Maria L Rouse  Dx:   Encounter Diagnosis     ICD-10-CM    1. Total knee replacement status, bilateral  Z96.653       2. Status post bilateral knee replacements  Z96.653           Start Time: 07  Stop Time: 08  Total time in clinic (min): 60 minutes    Subjective: Pt stated that she is doing well today and presented to clinic today with spinal stimulator on which is helping with her low back symptoms. Pt stated that her pain is about a 4 or 5/10 at start of session. Objective: See treatment diary below    Active Range of Motion   Left Knee   Flexion: 125 degrees   Extension: 3 degrees      Right Knee   Flexion: 130 degrees   Extension: 4 degrees      Strength/Myotome Testing      Left Hip   Planes of Motion   Flexion: 4  Abduction: 3+     Right Hip   Planes of Motion   Flexion: 4  Abduction: 3     Left Knee   Flexion: 4+  Extension: 4  Quadriceps contraction: fair     Right Knee   Flexion: 4+  Extension: 4-  Quadriceps contraction: poor (slight improvement noted however)     Tests     Additional Tests Details  5xSTS: 17 seconds (29 at IE on )  TU seconds (55 at IE on )      Assessment: Pt strength, ROM, and functional ability were reassessed during session today. Pt demonstrated slight improvements with BLE strength, especially into knee flexion, but continues to have limitations into b/l hip abduction and quad activation, plan to focus more on quad activation activities to improve stability during gait.  Pt continues to have difficulty with terminal knee extension, but did show mild improvement in b/l knee extension strength, continue to work on hamstrings stretching and knee extension activity Pt showed good improvement in TUG and 5xSTS time compared to IE, but could still benefit from continued gait training and LE strengthening to improve fall risk status and community ambulation ability. Pt was re educated on proper form of quad set during session and re emphasized importance of performing at home to improve knee stability. Overall, pt is seeing good improvement from PT but still has moderate limitations that should be continued to be addressed through formal PT sessions. Pt would benefit from continued skilled PT to improve BLE strength, ROM, mobility, gait, balance, and functional ability. Goals  ST. Pt will decrease pain at rest to 4/10. Met ()  2. Pt will be independent with HEP. Met ()  3. Pt will improve b/l quad activation to good to improve gait ability. Not Met ()  4. Pt will improve b/l knee extension ROM to 2 degrees or less. Partially Met () LLE achieved, RLE did not. LT. Pt will decrease pain to 2/10 or better by time of discharge. 2. Pt will improve b/l knee extension and flexion strength to 4+/5.  3. Pt will improve b/l knee flexion and extension to WNL to improve gait and stair climbing ability. 4. Pt will improve b/l hip flexion and abduction strength to 4+/5.  5. Pt will improve ability to perform SLS b/l to 10 seconds. Plan: Continue per plan of care. Progress treatment as tolerated.        Precautions: s/p TKA (R TKA 3/9/22, L TKA 22)    Date    Visit # 11 12 13   6 7 8 9 10   FOTO   Done (d/c)          Re-eval   DK            Manuals    B/l knee PROM R PK     PK Ext PK ext HY nv nt   Hamstring stretch       PK HY                               Neuro Re-Ed     Quad sets   20x5" ea sup   supine 10x 10" hold ea side       clamshells      np       LAQ  2x10 3#    2x10 5" ea 1# 2x10 5" ea 1# 2x10 5" ea 2# 2x10 5" ea 2# 2x10 5" ea 2#   SAQ 2x10 5" ea 2# 2x10 3#    np  2x10 5" ea 2# 2x10 5" ea 2# 2x10 5" ea 2#   TKE             bridges 10x            Weight shifts             SLS             Ther Ex  7/31 8/2 6/28 6/30 7/12 7/17    bike 6' 6' 6'   6' 6' 6' 6' 6'   Leg press 75# 20x  95# 2x10 2x15 105#    65# 20x 75# 20x 75# 20x 75# 20x    SL leg press 45# 20x  45# 20x  2x10 55#  ea       45# 20x ea   Seated march 2x10 3# 2x10 3#    np Stand1# 15x ea  2# 2x10 2x10 2# 2x10 2#   Glut sets 15x5"     5" x 15  15x5" 15x5" 15x5"   Hamstring curl stand      15x  GTB       Standing hip abd/ext 10x2 ea OTB 10x2 ea OTB 2x10 ea OTB   1# 15x ea 1# 10x2 ea 2# 2x10 ea nv 10x ea GTB (use OTB nv)    Hamstring stretch             Supine heel slide             SLR  x 10     15x ea   2x10 2x10   Side steps   2 laps OTB   2 laps       Ther Activity 7/26 7/31 6/28 6/30 7/12 7/17 7/24   Sit to stands             Pt edu      PK PK HY HY    Step ups      10x 1R 10x2 1R 10x2 1R nv 20x ea 1 R   Gait Training 7/26 7/31 6/28 6/30 7/12 7/17 7/24   Walking with walker             Walking with cane      3 x 15' 3 x 15' 3x15' nv    Obstacle course with walker                          Modalities 7/26 7/31 6/28 6/30 7/12 7/17 7/24

## 2023-08-09 ENCOUNTER — OFFICE VISIT (OUTPATIENT)
Dept: PHYSICAL THERAPY | Facility: CLINIC | Age: 58
End: 2023-08-09
Payer: MEDICARE

## 2023-08-09 DIAGNOSIS — Z96.653 STATUS POST BILATERAL KNEE REPLACEMENTS: ICD-10-CM

## 2023-08-09 DIAGNOSIS — Z96.653 TOTAL KNEE REPLACEMENT STATUS, BILATERAL: Primary | ICD-10-CM

## 2023-08-09 PROCEDURE — 97110 THERAPEUTIC EXERCISES: CPT

## 2023-08-09 PROCEDURE — 97112 NEUROMUSCULAR REEDUCATION: CPT

## 2023-08-09 NOTE — PROGRESS NOTES
Daily Note     Today's date: 2023  Patient name: Richland Pump  : 1965  MRN: [id number]  Referring provider: Sarah Esposito,*  Dx: No diagnosis found. Subjective: Pt presents to PT reporting her knees feel good but she states her legs feel weak. Pt states she feels good post PT session but does reports she feels tired. Objective: See treatment diary below. Attempted to increase height in pt's walker. Able to increase the left handle however unable to increase R secondary to handle missing to loosen mechanism to increase handle. Assessment:  Pt demonstrates difficulty with OTB; added 2# weight with improved AROM. Pt demonstrates difficulty AMB with SPC secondary to poor gait pattern despite frequent verbal and tactile cuing. She also demonstrates R LE quad lag that improves with cuing but fatigues quickly. Pt also demonstrates difficulty with lumbar extension which may be secondary to weakness. Educated pt on using walker c decreased weight bearing through B ue and use B LE weight bearing to assist with  endurance. Patient demonstrated fatigue post treatment, exhibited good technique with therapeutic exercises and would benefit from continued PT to increase flexibility, strength and function. Plan: Continue per plan of care.       Precautions: s/p TKA (R TKA 3/9/22, L TKA 22)    Date    Visit # 11 12 13 14     9 10   FOTO   Done (d/c)          Re-eval   DK            Manuals    B/l knee PROM R PK        nv nt   Hamstring stretch                                       Neuro Re-Ed     Quad sets   20x5" ea sup          clamshells             LAQ  2x10 3#  3x10 3#     2x10 5" ea 2# 2x10 5" ea 2#   SAQ 2x10 5" ea 2# 2x10 3#       2x10 5" ea 2# 2x10 5" ea 2#   TKE             bridges 10x   15x         Weight shifts             SLS             Ther Ex  7/17    bike 6' 6' 6' 6'     6' 6'   Leg press 75# 20x  95# 2x10 2x15 105# 2x15 105#     75# 20x 75# 20x    SL leg press 45# 20x  45# 20x  2x10 55#  ea 2x15 57#  ea      45# 20x ea   Seated march 2x10 3# 2x10 3#  3x10 3#     2x10 2# 2x10 2#   Glut sets 15x5"        15x5" 15x5"   Hamstring curl stand             Standing hip abd/ext 10x2 ea OTB 10x2 ea OTB 2x10 ea OTB 2x15 ea 2#     nv 10x ea GTB (use OTB nv)    Hamstring stretch             Supine heel slide             SLR  x 10   2 x 10 ea     2x10 2x10   Side steps   2 laps OTB          Ther Activity 7/26 7/31 8/9 7/17 7/24   Sit to stands             Pt edu         HY    Step ups         nv 20x ea 1 R   Gait Training 7/26 7/31 8/9 7/17 7/24   Walking with walker    2 laps RW c higher handle, 1 lap c          Walking with cane         nv    Obstacle course with walker                          Modalities 7/26 7/31 8/9 7/17 7/24

## 2023-08-14 ENCOUNTER — TELEPHONE (OUTPATIENT)
Dept: NEUROLOGY | Facility: CLINIC | Age: 58
End: 2023-08-14

## 2023-08-14 NOTE — TELEPHONE ENCOUNTER
Pt left a VM requesting refills of Lyrica, Cymbalta and something else that this writer was unable to understand. Unable to transcribe as pt is very difficult to understand. Per chart review, pt has refills available of Lyrica, and is not currently on Cymbalta. Called back and left a VM with call back #.

## 2023-08-15 NOTE — TELEPHONE ENCOUNTER
08-, 3:01:08 pm EDT  Taken off 8/15/2023 9:23am    Patient requesting Linda Madsen to refill cymbalta, lyrica and diclofenac cream. Normally prescribed by PCP but she has not seen PCP for quite some time and he will not refill until seen in office.     Requesting:  cymbalta 60mg BID  lyrica 50mg BID   Diclofenac topical gel    [phone]

## 2023-08-16 ENCOUNTER — HOSPITAL ENCOUNTER (EMERGENCY)
Facility: HOSPITAL | Age: 58
Discharge: HOME | End: 2023-08-16
Attending: EMERGENCY MEDICINE
Payer: MEDICARE

## 2023-08-16 ENCOUNTER — OFFICE VISIT (OUTPATIENT)
Dept: PHYSICAL THERAPY | Facility: CLINIC | Age: 58
End: 2023-08-16
Payer: MEDICARE

## 2023-08-16 VITALS
TEMPERATURE: 98.3 F | DIASTOLIC BLOOD PRESSURE: 99 MMHG | OXYGEN SATURATION: 100 % | SYSTOLIC BLOOD PRESSURE: 159 MMHG | HEART RATE: 60 BPM | RESPIRATION RATE: 17 BRPM

## 2023-08-16 DIAGNOSIS — Z86.73 HISTORY OF CVA (CEREBROVASCULAR ACCIDENT): ICD-10-CM

## 2023-08-16 DIAGNOSIS — F41.9 ANXIETY: Primary | ICD-10-CM

## 2023-08-16 DIAGNOSIS — Z96.653 STATUS POST BILATERAL KNEE REPLACEMENTS: ICD-10-CM

## 2023-08-16 DIAGNOSIS — Z96.653 TOTAL KNEE REPLACEMENT STATUS, BILATERAL: Primary | ICD-10-CM

## 2023-08-16 PROCEDURE — 93005 ELECTROCARDIOGRAM TRACING: CPT

## 2023-08-16 PROCEDURE — 97110 THERAPEUTIC EXERCISES: CPT

## 2023-08-16 PROCEDURE — 96372 THER/PROPH/DIAG INJ SC/IM: CPT

## 2023-08-16 PROCEDURE — 99284 EMERGENCY DEPT VISIT MOD MDM: CPT

## 2023-08-16 PROCEDURE — 97112 NEUROMUSCULAR REEDUCATION: CPT

## 2023-08-16 RX ORDER — DIAZEPAM 5 MG/ML
5 INJECTION, SOLUTION INTRAMUSCULAR; INTRAVENOUS ONCE
Status: COMPLETED | OUTPATIENT
Start: 2023-08-16 | End: 2023-08-16

## 2023-08-16 RX ORDER — LORAZEPAM 1 MG/1
1 TABLET ORAL ONCE
Status: COMPLETED | OUTPATIENT
Start: 2023-08-16 | End: 2023-08-16

## 2023-08-16 RX ORDER — ASPIRIN 81 MG/1
81 TABLET, COATED ORAL DAILY
Qty: 90 TABLET | Refills: 3 | Status: SHIPPED | OUTPATIENT
Start: 2023-08-16

## 2023-08-16 RX ADMIN — LORAZEPAM 1 MG: 1 TABLET ORAL at 21:49

## 2023-08-16 RX ADMIN — DIAZEPAM 5 MG: 10 INJECTION, SOLUTION INTRAMUSCULAR; INTRAVENOUS at 19:50

## 2023-08-16 NOTE — PROGRESS NOTES
Daily Note     Today's date: 2023  Patient name: Ciarra Wilson  : 1965  MRN: [id number]  Referring provider: Mookie Antonio  Dx:   Encounter Diagnosis     ICD-10-CM    1. Total knee replacement status, bilateral  Z96.653       2. Status post bilateral knee replacements  Z96.653           Start Time: 07  Stop Time: 0808  Total time in clinic (min): 38 minutes    Subjective: Pt presents to PT reporting her knees feel stiff. She also states she is not feeling quite right stating her anxiety medicine was sent to another pharmacy. Pt denies increased pain but reports fatigue post PT session. Objective: See treatment diary below. Attempted to increase height in pt's walker. Able to increase the left handle however unable to increase R secondary to handle missing to loosen mechanism to increase handle. Assessment:  Limited TE today secondary to fatigue and what she states is anxiety. Pt demonstrates B LE quad lag fatiguing quickly. Pt reports soreness in B quads with knee extension. Pt educated on performing knee extension exercises at home; she reports a verbal understanding. Patient demonstrated fatigue post treatment, exhibited good technique with therapeutic exercises and would benefit from continued PT to increase flexibility, strength and function. Plan: Continue per plan of care.       Precautions: s/p TKA (R TKA 3/9/22, L TKA 22)    Date    Visit # 11 12 13 14 15    9 10   FOTO   Done (d/c) -- --        Re-eval   DK            Manuals    B/l knee PROM R PK        nv nt   Hamstring stretch                                       Neuro Re-Ed     Quad sets   20x5" ea sup          clamshells             LAQ  2x10 3#  3x10 3# 3x10 3#    2x10 5" ea 2# 2x10 5" ea 2#   SAQ 2x10 5" ea 2# 2x10 3#       2x10 5" ea 2# 2x10 5" ea 2#   TKE             bridges 10x   15x 2 x 10 Weight shifts             SLS             Ther Ex 7/26 7/31 8/2 8/9 8/16 7/17    bike 6' 6' 6' 6' 6'    6' 6'   Leg press 75# 20x  95# 2x10 2x15 105# 2x15 105# 2x15 107#    75# 20x 75# 20x    SL leg press 45# 20x  45# 20x  2x10 55#  ea 2x15 57#  ea 2x15 57#  ea     45# 20x ea   Seated march 2x10 3# 2x10 3#  3x10 3#     2x10 2# 2x10 2#   Glut sets 15x5"        15x5" 15x5"   Hamstring curl stand             Standing hip abd/ext 10x2 ea OTB 10x2 ea OTB 2x10 ea OTB 2x15 ea 2#     nv 10x ea GTB (use OTB nv)    Hamstring stretch             Supine heel slide             SLR  x 10   2 x 10 ea 2 x 10 ea    2x10 2x10   Side steps   2 laps OTB          Ther Activity 7/26 7/31 8/9 8/16 7/17 7/24   Sit to stands             Pt edu         HY    Step ups         nv 20x ea 1 R   Gait Training 7/26 7/31 8/9 8/16 7/17 7/24   Walking with walker    2 laps RW c higher handle, 1 lap c          Walking with cane         nv    Obstacle course with walker                          Modalities 7/26 7/31 8/9 8/16 7/17 7/24

## 2023-08-16 NOTE — ED PROVIDER NOTES
Pt Name: Gopal Sierra  MRN: [id number]  [address] 1965  Age/Sex: 59 y.o. female  Date of evaluation: 8/16/2023  PCP: Paulo Swain    Chief Complaint   Patient presents with   • Anxiety     Pt states she ran out of her ativan on Thursday. Just got a refill today and took it along with prescribed klonopine and paxil. Pt states it made her anxiety worse and is c/o SOB, headache, nausea, and weakness. HPI    Samantha presents to the Emergency Department complaining of " a bad panic attack". She had been out of her ativan but now has it. When she took it, her symptoms did not completely resolve and that seemed to upset her even more. She is living at home with her daughter who has "lots of kids" and she did not want to cause an issue so she came here for help. HPI      Past Medical and Surgical History    Past Medical History:   Diagnosis Date   • Anxiety    • Arthritis     left knee   • Arthritis of right knee 10/6/2020   • At risk for falls    • Bipolar 2 disorder (HCC)     FOLLOWS WITH PSYCHIATRIST. CONTINUE LAMOTRIGINE; RESOLVED: [id number]   • Depression    • Familial tremor     both hands   • Fibromyalgia     LAST ASSESSED: [id number]   • GERD (gastroesophageal reflux disease)    • Hearing aid worn     left ear   • [place] (hard of hearing)     left ear   • Hyperlipidemia    • Hypertension    • Left-sided weakness    • Lumbar disc disease with radiculopathy 2/2/2018   • Memory loss of unknown cause     long and short term   • Migraine    • Multiple closed fractures of metatarsal bone of right foot 5/2/2021   • Obesity    • Obesity, Class II, BMI 35-39.9    • Osteoarthritis of both hips 10/31/2016   • Osteoarthritis of knee 2/20/2013    Description: Continue Tylenol and Naproxen. Encourage exercise and weight loss. Patient refused physical therapy. I will refer the patient back to Orthopedics.    • Overactive bladder    • Panic attack    • Patellofemoral disorder of both knees 5/1/2020   • Post traumatic stress disorder    • Primary localized osteoarthritis of both knees 6/16/2017   • Primary osteoarthritis of both knees 5/1/2020   • S/P insertion of spinal cord stimulator     no remote   • S/P total knee arthroplasty, right 3/10/2022   • Sacroiliitis ([address]) 2/28/2017   • Seasonal allergies    • Seizure-like activity ([address]) 6/3/2022   • Seizures ([address])     possible seizure like activity   • Small bowel obstruction ([address]) 3/24/2023   • Status post total knee replacement, left 7/5/2022   • Stroke [facility])     questionable stroke 2009   • Tardive dyskinesia     PATIENT STATES   • 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   CONT ASA 81 MG DAILY; RESOLVED: [id number]   • Urinary incontinence    • Uses walker    • Wears dentures     partial lower / full upper- doesnt wear   • Wears glasses        Past Surgical History:   Procedure Laterality Date   • BACK SURGERY     • [address] [facility]   • COLONOSCOPY      RESOLVED: [id number]   • EAR SURGERY     • EGD     • HYSTERECTOMY  2004   • MYRINGOTOMY W/ TUBES Left    • NECK SURGERY  04/2019   • NM ARTHRP KNE CONDYLE&PLATU MEDIAL&LAT COMPARTMENTS Right 3/9/2022    Procedure: ARTHROPLASTY KNEE TOTAL;  Surgeon: Stephanie Le DO;  Location: [facility];  Service: Orthopedics   • NM ARTHRP KNE CONDYLE&PLATU MEDIAL&LAT COMPARTMENTS Left 7/5/2022    Procedure: ARTHROPLASTY KNEE TOTAL;  Surgeon: Stephanie Le DO;  Location: [facility];  Service: Orthopedics   • NM CYSTOURETHROSCOPY N/A 2/18/2016    Procedure: CYSTOSCOPY, BOTOX INJECTION;  Surgeon: Swapnil Briggs MD;  Location: [facility];  Service: Gynecology   • NM INSJ/RPLCMT SPI NPGR DIR/INDUXIVE COUPLING Right 2/10/2021    Procedure: REPLACEMENT IMPLANTABLE PULSE GENERATOR DORSAL SPINAL COLUMN STIMULATOR, RIGHT;  Surgeon: Itzel Hoover MD;  Location: [facility];  Service: Neurosurgery   • NM PR[address] NSTIM ELECTRODE ARRAY EPIDURAL Right 7/28/2020    Procedure: INSERTION THORACIC DORSAL COLUMN SPINAL CORD STIMULATOR PERCUTANEOUS W IMPLANTABLE PULSE GENERATOR, RIGHT;  Surgeon: Fouzia Simon MD;  Location:  MAIN OR;  Service: Neurosurgery   • TONSILLECTOMY     • TUBAL LIGATION  1986   • UPPER GASTROINTESTINAL ENDOSCOPY  09/2020       Family History   Problem Relation Age of Onset   • Colon cancer Mother    • Alzheimer's disease Father    • Stroke Father    • Colon cancer Brother    • Bipolar disorder Brother    • Breast cancer Maternal Aunt    • Colon cancer Maternal Aunt    • Heart disease Other    • Diabetes Other    • Hypertension Other    • Seizures Son    • Depression Paternal Grandfather    • No Known Problems Sister    • No Known Problems Brother    • Thyroid disease Neg Hx        Social History     Tobacco Use   • Smoking status: Never   • Smokeless tobacco: Never   Vaping Use   • Vaping Use: Never used   Substance Use Topics   • Alcohol use: Not Currently     Comment: 2 x year; being a social drinker as per all scripts    • Drug use: No         .    Allergies    No Known Allergies    Home Medications    Prior to Admission medications    Medication Sig Start Date End Date Taking?  Authorizing Provider   amLODIPine (NORVASC) 10 mg tablet TAKE 1 TABLET BY MOUTH EVERY DAY 7/26/22   Glo Valente,    Aspirin Low Dose 81 MG EC tablet TAKE 1 TABLET BY MOUTH EVERY DAY 8/16/23   Fiordaliza Parker DO   atorvastatin (LIPITOR) 40 mg tablet TAKE 1 TABLET BY MOUTH EVERY DAY 4/21/23   Glo Valente DO   clonazePAM (KlonoPIN) 0.5 mg tablet Take 1 tablet (0.5 mg total) by mouth 2 (two) times a day 7/20/23 8/19/23  Opal Aguiar PA-C   docusate sodium (COLACE) 100 mg capsule Take 1 capsule (100 mg total) by mouth every 12 (twelve) hours 5/25/23   Gita Conner PA-C   ferrous sulfate 324 (65 Fe) mg Take 1 tablet (324 mg total) by mouth daily 6/2/22   Omaira Alonso DO   folic acid (FOLVITE) 1 mg tablet TAKE 1 TABLET BY MOUTH EVERY DAY 6/16/22   Nayely Mchugh PA-C hydrOXYzine HCL (ATARAX) 50 mg tablet Take 50 mg by mouth 3 (three) times a day as needed for itching    Historical Provider, MD   Ingrezza 40 MG CAPS Take 40 mg by mouth in the morning 7/10/23   [facility]   LORazepam (ATIVAN) 1 mg tablet Take 0.5 tablets (0.5 mg total) by mouth every 6 (six) hours as needed for anxiety for up to 15 days 7/20/23 8/4/23  Gabby Campuzano PA-C   methocarbamol (ROBAXIN) 500 mg tablet Take 1 tablet (500 mg total) by mouth 2 (two) times a day as needed for muscle spasms 7/20/23   Gabby Campuzano PA-C   mirtazapine (REMERON) 15 mg tablet Take 1 tablet (15 mg total) by mouth daily at bedtime 7/20/23 8/19/23  Rylie Montoya PA-C   pregabalin (LYRICA) 50 mg capsule 1 capsule twice a day. May increase to 1 capsule 3 times per day if needed. 7/10/23   [facility]   QUEtiapine (SEROquel) 50 mg tablet Take 1 tablet (50 mg total) by mouth 4 (four) times a day (with meals and at bedtime) 7/20/23 8/19/23  Gabby Campuzano PA-C   aspirin (ECOTRIN LOW STRENGTH) 81 mg EC tablet Take 1 tablet (81 mg total) by mouth daily 10/12/22 8/16/23  Darcy Seip, DO           Review of Systems    Review of Systems   Constitutional: Negative for chills and fever. HENT: Negative for ear pain and sore throat. Eyes: Negative for pain and visual disturbance. Respiratory: Negative for cough and shortness of breath. Cardiovascular: Negative for chest pain and palpitations. Gastrointestinal: Negative for abdominal pain and vomiting. Genitourinary: Negative for dysuria and hematuria. Musculoskeletal: Negative for arthralgias and back pain. Skin: Negative for color change and rash. Neurological: Negative for seizures and syncope. Psychiatric/Behavioral: The patient is nervous/anxious. All other systems reviewed and are negative.         Physical Exam      ED Triage Vitals [08/16/23 1713]   Temperature Pulse Respirations Blood Pressure SpO2   98.3 °F (36.8 °C) 73 22 (!) 150/105 100 % Temp Source Heart Rate Source Patient Position - Orthostatic VS BP Location FiO2 (%)   Oral Monitor Lying Left arm --      Pain Score       --               Physical Exam  Vitals and nursing note reviewed. Constitutional:       General: She is not in acute distress. Appearance: She is well-developed. HENT:      Head: Normocephalic and atraumatic. Eyes:      Conjunctiva/sclera: Conjunctivae normal.   Cardiovascular:      Rate and Rhythm: Normal rate and regular rhythm. Heart sounds: No murmur heard. Pulmonary:      Effort: Pulmonary effort is normal. No respiratory distress. Breath sounds: Normal breath sounds. Abdominal:      Palpations: Abdomen is soft. Tenderness: There is no abdominal tenderness. Musculoskeletal:         General: No swelling. Cervical back: Neck supple. Skin:     General: Skin is warm and dry. Capillary Refill: Capillary refill takes less than 2 seconds. Neurological:      Mental Status: She is alert. Psychiatric:         Mood and Affect: Mood normal.                Assessment and Plan    Samantha Wallace is a 59 y.o. female who presents with anxiety. Physical examination unremarkable. Patient will be treated symptomatically and discharged.        MDM    Diagnostic Results        EKG INTERPRETATIO    Labs:      Procedure    Procedures      ED Course of Care and Re-Assessments    Medications   diazepam (VALIUM) injection 5 mg (5 mg Intramuscular Given 8/16/23 1950)   LORazepam (ATIVAN) tablet 1 mg (1 mg Oral Given 8/16/23 2149)           FINAL IMPRESSION    Final diagnoses:   Anxiety         DISPOSITION/PLAN    Time reflects when diagnosis was documented in both MDM as applicable and the Disposition within this note     Time User Action Codes Description Comment    8/16/2023  9:40 PM Timi Garcia Add [F41.9] Anxiety       ED Disposition     ED Disposition   Discharge    Condition   Stable    Date/Time   Wed Aug 16, 2023  9:40 PM Comment   Samantha Rodriguez discharge to home/self care. Follow-up Information    None           PATIENT REFERRED TO:    No follow-up provider specified. DISCHARGE MEDICATIONS:    Patient's Medications   Discharge Prescriptions    No medications on file       No discharge procedures on file.          Mathew Terrazas, [address],   08/16/23 4182

## 2023-08-17 LAB
ATRIAL RATE: 86 BPM
P AXIS: 38 DEGREES
PR INTERVAL: 116 MS
QRS AXIS: 119 DEGREES
QRSD INTERVAL: 86 MS
QT INTERVAL: 354 MS
QTC INTERVAL: 405 MS
T WAVE AXIS: 16 DEGREES
VENTRICULAR RATE: 79 BPM

## 2023-08-17 PROCEDURE — 93010 ELECTROCARDIOGRAM REPORT: CPT | Performed by: INTERNAL MEDICINE

## 2023-08-18 ENCOUNTER — PATIENT OUTREACH (OUTPATIENT)
Dept: INTERNAL MEDICINE CLINIC | Facility: CLINIC | Age: 58
End: 2023-08-18
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
## 2023-08-21 ENCOUNTER — PATIENT OUTREACH (OUTPATIENT)
Dept: INTERNAL MEDICINE CLINIC | Facility: CLINIC | Age: 58
End: 2023-08-21

## 2023-08-21 ENCOUNTER — OFFICE VISIT (OUTPATIENT)
Dept: PHYSICAL THERAPY | Facility: CLINIC | Age: 58
End: 2023-08-21
Payer: MEDICARE

## 2023-08-21 DIAGNOSIS — Z96.653 TOTAL KNEE REPLACEMENT STATUS, BILATERAL: Primary | ICD-10-CM

## 2023-08-21 PROCEDURE — 97112 NEUROMUSCULAR REEDUCATION: CPT

## 2023-08-21 PROCEDURE — 97110 THERAPEUTIC EXERCISES: CPT

## 2023-08-21 NOTE — TELEPHONE ENCOUNTER
Received -Hi, this is aicha pedro. Calling back again for Dr. Porfirio Peterson. My phone number, you have my number [phone]. I'm calling again, he prescribed lyrica for me. I have that. But I'm wondering if he can prescribe cymbalta and diclofenac gel. that goes along with the lyrica. cymbalta is not on the list, but i've been taking it for about a month  So if you could do that for me.  My [facility] [address] [facility] in [place], thank you  ------------------------------------------------  Left detailed message per communication consent regarding lior's message

## 2023-08-21 NOTE — TELEPHONE ENCOUNTER
MERCY Kaminski  to Dirk House RN        8/15/23 10:23 AM  I prescribed lyrica early July with refills so these should be available at pharmacy   Cymbalta is not on her list and last I saw she is supposed to be on remeron which I would suggest she gets from psychiatry   Diclofenac is over the counter.   Ginger Lugo

## 2023-08-21 NOTE — PROGRESS NOTES
SW received call from pt who was asking for PCP to re - prescribe Cymbalta medication for her. She felt it was helpful for her muscles. SW shared that it is best for her to speak to her PCP re same . SW notes she is on Lyrica and [place] mediations. SW encouraged hr to come to her upcoming PCP appointment 9/1/23 and to being all of her medications with her. SW shares that her son in law who is also her  paid caregiver will bring her. In addition, pt had concerns re panic attacks and shared that had a recent ED visit re danyel. Pt shares that she remains active with [facility] and [facility]. She sees the psychiatric there and also has a  names Ravi there. They are helping her with a referral to [facility] ) a program she was in the past and that she would like to return to. Pt indicates she would like to be in a more independent setting than with her family . She feels she is doing better. She shares she is walking better and using the exercise bike and is going up and down the stairs. She shares that  Her family maybe down sizing their apt and she would just like to be in her own place. She further relates " everything is fine" at home. SW offered to help her call her  re the Housing referral but she declines and indicated she would preferred to follow up herself and did not want SW involvement at this time. Pt also asked about earlier reports made to APS. Pt not clear as to the timing of same. She indicated that the reports may have causes stress for family members. She wants info removed from their data base. HIRAM did provided her the # for [facility] [phone] so she can inquire further f/u with them. HIRAM notes she has called Wilver Diego RN/CM earlier re danyel . Wilver Diego RN/CM did join in for some of this call. .  CM does not have the authority to remove any info from their system.    HIRAM did advise she could ask further if any cases were founded or unfounded and what that means. Please re-consult SW as needed.

## 2023-08-21 NOTE — PROGRESS NOTES
Daily Note     Today's date: 2023  Patient name: Jana Bains  : 1965  MRN: [id number]  Referring provider: Gracy Redding  Dx:   Encounter Diagnosis     ICD-10-CM    1. Total knee replacement status, bilateral  Z96.653           Start Time: 0730  Stop Time: 0800  Total time in clinic (min): 30 minutes    Subjective: Pt reports she is feeling ok today, states she had a good weekend. Pt reports some stiffness overall, but "not too bad."      Objective: See treatment diary below. Assessment:  Pt tolerated today's session well, but continues to show fatigue. Pt shows continued lag with SLR and LAQs. Pt performed increased weight on LP as noted. Continue to progress as able. Patient demonstrated fatigue post treatment, exhibited good technique with therapeutic exercises and would benefit from continued PT to increase flexibility, strength and function. Plan: Continue per plan of care.       Precautions: s/p TKA (R TKA 3/9/22, L TKA 22)    Date    Visit # 11 12 13 14 15 16   9 10   FOTO   Done (d/c) -- --        Re-eval   DK            Manuals    B/l knee PROM R PK        nv nt   Hamstring stretch                                       Neuro Re-Ed     Quad sets   20x5" ea sup          clamshells             LAQ  2x10 3#  3x10 3# 3x10 3# 3x10 3#   2x10 5" ea 2# 2x10 5" ea 2#   SAQ 2x10 5" ea 2# 2x10 3#       2x10 5" ea 2# 2x10 5" ea 2#   TKE             bridges 10x   15x 2 x 10 2x10       Weight shifts             SLS             Ther Ex     bike 6' 6' 6' 6' 6' 6'   6' 6'   Leg press 75# 20x  95# 2x10 2x15 105# 2x15 105# 2x15 107# 2x15 109#   75# 20x 75# 20x    SL leg press 45# 20x  45# 20x  2x10 55#  ea 2x15 57#  ea 2x15 57#  ea 2x15 59#  ea    45# 20x ea   Seated march 2x10 3# 2x10 3#  3x10 3#  3x10 3#   2x10 2# 2x10 2#   Glut sets 15x5"        15x5" 15x5"   Hamstring curl stand             Standing hip abd/ext 10x2 ea OTB 10x2 ea OTB 2x10 ea OTB 2x15 ea 2#  2x15 ea 2#   nv 10x ea GTB (use OTB nv)    Hamstring stretch             Supine heel slide             SLR  x 10   2 x 10 ea 2 x 10 ea 2x10 ea   2x10 2x10   Side steps   2 laps OTB          Ther Activity 7/26 7/31 8/9 8/16 8/21 7/17 7/24   Sit to stands             Pt edu         HY    Step ups         nv 20x ea 1 R   Gait Training 7/26 7/31 8/9 8/16 8/21 7/17 7/24   Walking with walker    2 laps RW c higher handle, 1 lap c          Walking with cane         nv    Obstacle course with walker                          Modalities 7/26 7/31 8/9 8/16 8/21 7/17 7/24

## 2023-08-23 ENCOUNTER — OFFICE VISIT (OUTPATIENT)
Dept: PHYSICAL THERAPY | Facility: CLINIC | Age: 58
End: 2023-08-23
Payer: MEDICARE

## 2023-08-23 DIAGNOSIS — Z96.653 STATUS POST BILATERAL KNEE REPLACEMENTS: ICD-10-CM

## 2023-08-23 DIAGNOSIS — I10 HYPERTENSION, UNSPECIFIED TYPE: ICD-10-CM

## 2023-08-23 DIAGNOSIS — Z96.653 TOTAL KNEE REPLACEMENT STATUS, BILATERAL: Primary | ICD-10-CM

## 2023-08-23 PROCEDURE — 97140 MANUAL THERAPY 1/> REGIONS: CPT

## 2023-08-23 PROCEDURE — 97112 NEUROMUSCULAR REEDUCATION: CPT

## 2023-08-23 PROCEDURE — 97110 THERAPEUTIC EXERCISES: CPT

## 2023-08-23 RX ORDER — AMLODIPINE BESYLATE 10 MG/1
TABLET ORAL
Qty: 90 TABLET | Refills: 2 | Status: SHIPPED | OUTPATIENT
Start: 2023-08-23

## 2023-08-23 NOTE — PROGRESS NOTES
Daily Note     Today's date: 2023  Patient name: Melvin Nur  : 1965  MRN: [id number]  Referring provider: Alber Lyman  Dx:   Encounter Diagnosis     ICD-10-CM    1. Total knee replacement status, bilateral  Z96.653       2. Status post bilateral knee replacements  Z96.653           Start Time: 0800  Stop Time: 0900  Total time in clinic (min): 60 minutes    Subjective: Pt stated that her legs are doing fine today, but her RLE has more tightness compared to her LLE. Objective: See treatment diary below      Assessment: Pt demonstrated improved exercise tolerance and LE strength with progression of resistance with mild fatigue post session. Pt continues to demonstrate difficulty achieving terminal knee extension with poor quad activation on R side, pt was educated further on proper activation of her quad and to perform quad set with other knee extension and hip flexion activities throughout session. Pt also demonstrated increased tightness of b/l hip flexors and pt tolerated manual hip flexor stretch and quad STM with reported decrease in stiffness post manual treatment. Pt was challenged appropriately with performance of sit to stands with focus on glute activation when standing up. Pt would benefit from continued skilled PT to improve gait, balance, posture, BLE strength, and functional ability. Plan: Continue per plan of care. Progress treatment as tolerated.        Precautions: s/p TKA (R TKA 3/9/22, L TKA 22)    Date       Visit # 11 12 13 14 15 16 [address] (d/c) -- --        Re-eval   DK            Manuals       B/l knee PROM R PK            Hip flexor str       DK      Quad STM       DK w roller                   Neuro Re-Ed       Quad sets   20x5" ea sup    20x3" ea sup      clamshells             LAQ  2x10 3#  3x10 3# 3x10 3# 3x10 3# 3x10 2# 3" hold      SAQ 2x10 5" ea 2# 2x10 3#     2x10 3" hold ea 2#      TKE             bridges 10x   15x 2 x 10 2x10 20x2"      Weight shifts             SLS             Ther Ex 7/26 7/31 8/2 8/9 8/16 8/21 8/23      bike 6' 6' 6' 6' 6' 6' 6'      Leg press 75# 20x  95# 2x10 2x15 105# 2x15 105# 2x15 107# 2x15 109# 3x10 115#      SL leg press 45# 20x  45# 20x  2x10 55#  ea 2x15 57#  ea 2x15 57#  ea 2x15 59#  ea 3x10 65# ea      Seated march 2x10 3# 2x10 3#  3x10 3#  3x10 3# 2x10 PTB manu resist      Glut sets 15x5"            Hamstring curl stand             Standing hip abd/ext 10x2 ea OTB 10x2 ea OTB 2x10 ea OTB 2x15 ea 2#  2x15 ea 2# 2x10 ea 2#      Hamstring stretch             Supine heel slide             SLR  x 10   2 x 10 ea 2 x 10 ea 2x10 ea 2x10 2# ea w QS      Side steps   2 laps OTB          Ther Activity 7/26 7/31 8/9 8/16 8/21 8/23      Sit to stands       2x10 w 2" glute set at top      Pt edu             Step ups             Gait Training 7/26 7/31 8/9 8/16 8/21 8/23      Walking with walker    2 laps RW c higher handle, 1 lap c          Walking with cane             Obstacle course with walker                          Modalities 7/26 7/31 8/9 8/16 8/21

## 2023-08-24 ENCOUNTER — PATIENT OUTREACH (OUTPATIENT)
Dept: INTERNAL MEDICINE CLINIC | Facility: CLINIC | Age: 58
End: 2023-08-24

## 2023-08-24 DIAGNOSIS — Z78.9 NEEDS ASSISTANCE WITH COMMUNITY RESOURCES: Primary | ICD-10-CM

## 2023-08-24 NOTE — PROGRESS NOTES
SW has received a call from pt who was had unorgananized thoughts and was difficult to keep on topic. [facility] pt shares that she still trying to get into to [facility] and was told by her [id number] [facility] provider [address] she would not be appropriate for her. She disagrees and wants to go there. Pt has also discusses problems with her MH medications and increased severe panic attacks. SW attempted to clarify her issues and suggested they may feel that TLC is just temporary programs and she will need to have more supports and a longer term plan . SW suggested an assisted living place maybe an option? Pt feels she is doing much better and can eventually can live alone in her own apt. She stress she wants her Providers to speak to her about these issues "as she is an adult and not a child."    SW offered to reach out to her [facility]   and she agreed. We made a 3 way call and spoke to spoke to [facility] [phone] . We reviewed above and pt again stressed she wants to go to [facility] feeling they have a lot of support and she feels she can stay the for an year. She hope to get her own apt by then. She repeated she wants the Team spreaking to her about these issues. SW did ask if she has discussed her plans with her dgt and son in law and she indicates she has and they were supportive. SW also clarified they are her official care givers and she agreed they are for now. SW asked if we can speak to them re her care issues and she agreed we can . Pt reviewed past issues with her medications and Ms Luisito Kapoor inquired if she has enough medications until her appointment with them on Monday 8/28 /23 and she responded yes. Ms Luisito Kapoor asked her to count her pills she had left and pt seemed to not be able to do it at this time. SW asked if her son in law was present so he could help but pt did not answer the question. Ms. Luisito Kapoor to f/u with her re same.     SW reminded her of her PCP appointment her 9/1/23 and pt confirmed she will come and bring her medications with her. SW asked if she will give permission for [facility] / Ms Kathern Mohs her Case manger to get back to  and she indicted yes. Ms Elise Thomson will ask pt to sign a NICOLAS at her appointment with them. SW to attempt to see pt at this appointment and to assist as indicted.

## 2023-08-28 ENCOUNTER — OFFICE VISIT (OUTPATIENT)
Dept: PHYSICAL THERAPY | Facility: CLINIC | Age: 58
End: 2023-08-28
Payer: MEDICARE

## 2023-08-28 DIAGNOSIS — Z96.653 STATUS POST BILATERAL KNEE REPLACEMENTS: ICD-10-CM

## 2023-08-28 DIAGNOSIS — Z96.653 TOTAL KNEE REPLACEMENT STATUS, BILATERAL: Primary | ICD-10-CM

## 2023-08-28 PROCEDURE — 97140 MANUAL THERAPY 1/> REGIONS: CPT

## 2023-08-28 PROCEDURE — 97110 THERAPEUTIC EXERCISES: CPT

## 2023-08-28 NOTE — PROGRESS NOTES
Daily Note     Today's date: 2023  Patient name: Suzanne Garcia  : 1965  MRN: [id number]  Referring provider: Arnaud Ceja  Dx:   Encounter Diagnosis     ICD-10-CM    1. Total knee replacement status, bilateral  Z96.653       2. Status post bilateral knee replacements  Z96.653           Start Time: 0730  Stop Time: 0800  Total time in clinic (min): 30 minutes    Subjective: Pt reports that her knee is doing good overall today. She reports that she was active over the weekend and had no increase in symptoms during any activity she performed. Pt noted she needed to leave by 8 this session due to having another appointment she needs to make. Objective: See treatment diary below      Assessment: Pt responded well to manual STM, stretching, and PROM with improved knee joint and muscular mobility post-interventions. Increased weight for both single and double leg, leg press during today session. Pt was challenged by increase in weight but was able to demonstrate proper form throughout exercises and appropriate levels of fatigue post-exercise. Patient exhibited good technique with therapeutic exercises and would benefit from continued PT      Plan: Continue per plan of care. Progress treatment as tolerated.        Precautions: s/p TKA (R TKA 3/9/22, L TKA 22)    Date      Visit # 84 16 49 50 19 40 42 55     USIV   BBPN (d/c) -- --        Re-eval   DK            Manuals      B/l knee PROM R PK       PWK     Hip flexor str       DK PWK     Quad STM       DK w roller PWK                  Neuro Re-Ed      Quad sets   20x5" ea sup    20x3" ea sup      clamshells             LAQ  2x10 3#  3x10 3# 3x10 3# 3x10 3# 3x10 2# 3" hold      SAQ 2x10 5" ea 2# 2x10 3#     2x10 3" hold ea 2#      TKE             bridges 10x   15x 2 x 10 2x10 20x2"      Weight shifts             SLS Ther Ex 7/26 7/31 8/2 8/9 8/16 8/21 8/23 8/28     bike 6' 6' 6' 6' 6' 6' 6' 6'     Leg press 75# 20x  95# 2x10 2x15 105# 2x15 105# 2x15 107# 2x15 109# 3x10 115# 3x10 125#     SL leg press 45# 20x  45# 20x  2x10 55#  ea 2x15 57#  ea 2x15 57#  ea 2x15 59#  ea 3x10 65# ea 2x10 75#     Seated march 2x10 3# 2x10 3#  3x10 3#  3x10 3# 2x10 PTB manu resist      Glut sets 15x5"            Hamstring curl stand             Standing hip abd/ext 10x2 ea OTB 10x2 ea OTB 2x10 ea OTB 2x15 ea 2#  2x15 ea 2# 2x10 ea 2# 2x15 ea 2#     Hamstring stretch             Supine heel slide             SLR  x 10   2 x 10 ea 2 x 10 ea 2x10 ea 2x10 2# ea w QS 2x10 2#      Side steps   2 laps OTB          Ther Activity 7/26 7/31 8/9 8/16 8/21 8/23 8/28     Sit to stands       2x10 w 2" glute set at top      Pt edu             Step ups             Gait Training 7/26 7/31 8/9 8/16 8/21 8/23 8/28     Walking with walker    2 laps RW c higher handle, 1 lap c          Walking with cane             Obstacle course with walker                          Modalities 7/26 7/31 8/9 8/16 8/21

## 2023-08-30 ENCOUNTER — APPOINTMENT (OUTPATIENT)
Dept: PHYSICAL THERAPY | Facility: CLINIC | Age: 58
End: 2023-08-30
Payer: MEDICARE

## 2023-08-31 ENCOUNTER — OFFICE VISIT (OUTPATIENT)
Dept: PHYSICAL THERAPY | Facility: CLINIC | Age: 58
End: 2023-08-31
Payer: MEDICARE

## 2023-08-31 DIAGNOSIS — Z96.653 STATUS POST BILATERAL KNEE REPLACEMENTS: ICD-10-CM

## 2023-08-31 DIAGNOSIS — Z96.653 TOTAL KNEE REPLACEMENT STATUS, BILATERAL: Primary | ICD-10-CM

## 2023-08-31 PROCEDURE — 97110 THERAPEUTIC EXERCISES: CPT

## 2023-08-31 PROCEDURE — 97112 NEUROMUSCULAR REEDUCATION: CPT

## 2023-08-31 NOTE — PROGRESS NOTES
Daily Note     Today's date: 2023  Patient name: Xiomy Starr  : 1965  MRN: [id number]  Referring provider: Lelia Hsu  Dx:   Encounter Diagnosis     ICD-10-CM    1. Total knee replacement status, bilateral  Z96.653       2. Status post bilateral knee replacements  Z96.653           Start Time: 0725  Stop Time: 0800  Total time in clinic (min): 35 minutes    Subjective: Pt reports that her knee is doing good overall today. Pt states she has joined [email or web address] and has been working out there. Objective: See treatment diary below      Assessment: Pt tolerated today's session well with no adverse symptoms. Pt continues to work towards goals of increased LE strength and control. Pt demonstrates good knowledge of exercises, requiring min VCing for count and hold of exercises. Patient exhibited good technique with therapeutic exercises and would benefit from continued PT. Continue to progress as able. Plan: Continue per plan of care. Progress treatment as tolerated.        Precautions: s/p TKA (R TKA 3/9/22, L TKA 22)    Date     Visit # 60 87 72 36 13 54 44 07 32    UNXS   REZZ (d/c) -- --        Re-eval   DK            Manuals     B/l knee PROM R PK       PWK     Hip flexor str       DK PWK     Quad STM       DK w roller PWK                  Neuro Re-Ed     Quad sets   20x5" ea sup    20x3" ea sup  20x3" ea sup    clamshells             LAQ  2x10 3#  3x10 3# 3x10 3# 3x10 3# 3x10 2# 3" hold  3x10 2# 3" hold    SAQ 2x10 5" ea 2# 2x10 3#     2x10 3" hold ea 2#  3x10 2# 3" hold    TKE             bridges 10x   15x 2 x 10 2x10 20x2"  20x2"    Weight shifts             SLS             Ther Ex 7/26 7/31 8/2 8/9 8/16 8/21 8/23 8/28 8/31    bike 6' 6' 6' 6' 6' 6' 6' 6' 6'    Leg press 75# 20x  95# 2x10 2x15 105# 2x15 105# 2x15 107# 2x15 109# 3x10 115# 3x10 125# 3x10 125#    SL leg press 45# 20x  45# 20x  2x10 55#  ea 2x15 57#  ea 2x15 57#  ea 2x15 59#  ea 3x10 65# ea 2x10 75# 2x10 75#    Seated march 2x10 3# 2x10 3#  3x10 3#  3x10 3# 2x10 PTB manu resist      Glut sets 15x5"            Hamstring curl stand             Standing hip abd/ext 10x2 ea OTB 10x2 ea OTB 2x10 ea OTB 2x15 ea 2#  2x15 ea 2# 2x10 ea 2# 2x15 ea 2# 2x15 ea 2#    Hamstring stretch             Supine heel slide             SLR  x 10   2 x 10 ea 2 x 10 ea 2x10 ea 2x10 2# ea w QS 2x10 2#  2x10 2#     Side steps   2 laps OTB          Ther Activity 7/26 7/31 8/9 8/16 8/21 8/23 8/28 8/31    Sit to stands       2x10 w 2" glute set at top      Pt edu             Step ups             Gait Training 7/26 7/31 8/9 8/16 8/21 8/23 8/28 8/31    Walking with walker    2 laps RW c higher handle, 1 lap c          Walking with cane             Obstacle course with walker                          Modalities 7/26 7/31 8/9 8/16 8/21

## 2023-09-01 ENCOUNTER — PATIENT OUTREACH (OUTPATIENT)
Dept: INTERNAL MEDICINE CLINIC | Facility: CLINIC | Age: 58
End: 2023-09-01

## 2023-09-01 ENCOUNTER — OFFICE VISIT (OUTPATIENT)
Dept: INTERNAL MEDICINE CLINIC | Facility: CLINIC | Age: 58
End: 2023-09-01

## 2023-09-01 VITALS
DIASTOLIC BLOOD PRESSURE: 74 MMHG | TEMPERATURE: 97.7 F | SYSTOLIC BLOOD PRESSURE: 118 MMHG | HEART RATE: 89 BPM | WEIGHT: 173 LBS | BODY MASS INDEX: 32.69 KG/M2

## 2023-09-01 DIAGNOSIS — M17.12 PRIMARY OSTEOARTHRITIS OF LEFT KNEE: ICD-10-CM

## 2023-09-01 DIAGNOSIS — Z12.11 COLON CANCER SCREENING: ICD-10-CM

## 2023-09-01 DIAGNOSIS — G47.33 OSA (OBSTRUCTIVE SLEEP APNEA): ICD-10-CM

## 2023-09-01 DIAGNOSIS — K21.9 GASTROESOPHAGEAL REFLUX DISEASE WITHOUT ESOPHAGITIS: ICD-10-CM

## 2023-09-01 DIAGNOSIS — I63.9 CEREBROVASCULAR ACCIDENT (CVA), UNSPECIFIED MECHANISM (HCC): ICD-10-CM

## 2023-09-01 DIAGNOSIS — G24.01 TARDIVE DYSKINESIA: ICD-10-CM

## 2023-09-01 DIAGNOSIS — M79.89 LEG SWELLING: ICD-10-CM

## 2023-09-01 DIAGNOSIS — G89.4 CHRONIC PAIN DISORDER: ICD-10-CM

## 2023-09-01 DIAGNOSIS — I10 ESSENTIAL HYPERTENSION: ICD-10-CM

## 2023-09-01 DIAGNOSIS — M62.838 MUSCLE SPASM: ICD-10-CM

## 2023-09-01 DIAGNOSIS — F41.0 PANIC DISORDER WITHOUT AGORAPHOBIA: Primary | ICD-10-CM

## 2023-09-01 PROCEDURE — 3078F DIAST BP <80 MM HG: CPT | Performed by: INTERNAL MEDICINE

## 2023-09-01 PROCEDURE — 3074F SYST BP LT 130 MM HG: CPT | Performed by: INTERNAL MEDICINE

## 2023-09-01 PROCEDURE — 99213 OFFICE O/P EST LOW 20 MIN: CPT | Performed by: INTERNAL MEDICINE

## 2023-09-01 RX ORDER — METHOCARBAMOL 500 MG/1
500 TABLET, FILM COATED ORAL 2 TIMES DAILY PRN
Qty: 10 TABLET | Refills: 0 | Status: SHIPPED | OUTPATIENT
Start: 2023-09-01

## 2023-09-01 RX ORDER — DULOXETIN HYDROCHLORIDE 30 MG/1
30 CAPSULE, DELAYED RELEASE ORAL 2 TIMES DAILY
Qty: 30 CAPSULE | Refills: 1 | Status: SHIPPED | OUTPATIENT
Start: 2023-09-01 | End: 2023-10-01

## 2023-09-01 RX ORDER — FERROUS SULFATE TAB EC 324 MG (65 MG FE EQUIVALENT) 324 (65 FE) MG
324 TABLET DELAYED RESPONSE ORAL DAILY
Qty: 30 TABLET | Refills: 1 | Status: SHIPPED | OUTPATIENT
Start: 2023-09-01

## 2023-09-01 RX ORDER — OMEPRAZOLE 20 MG/1
20 CAPSULE, DELAYED RELEASE ORAL DAILY
COMMUNITY

## 2023-09-01 NOTE — PROGRESS NOTES
Assessment/Plan:    1. Panic disorder without agoraphobia  -Patient history of panic disorder with frequent panic attacks following with psychiatry  -Currently takes Ativan 0.5 mg every 6 as needed and Klonopin 0.5 mg 2 times daily and reports her symptoms are well controlled with this current regimen  -We will continue current medication regimen and recommend follow-up with psychiatry in 1 month scheduled appointment    2. Colon cancer screening  -Discussed colonoscopy colorectal cancer screening patient is agreeable we will refer to GI    3. Muscle spasm  -Patient reports muscle spasm states she normally takes Cymbalta and Robaxin as needed  -She states she is currently out of Cymbalta and has brought in her medication bottle  -Recommend to continue Robaxin as needed for muscle spasms and was prescribed 3 days supply of 30 mg twice daily Cymbalta until patient can be reassessed by psychiatry and make medication regimen adjustments as needed as to avoid withdrawal     4. Gastroesophageal reflux disease without esophagitis  -Patient reports symptoms of GERD and is currently using omeprazole 20 mg daily  -We will reassess at next office visit for continued necessity of treatment if patient is still taking at that time    5. MIMI (obstructive sleep apnea)  -Patient reports history of MIMI not currently using CPAP because she does have machine and will attempt to get the machine    6. Essential hypertension  -History of hypertension currently on amlodipine 10 mg daily; blood pressure today 118/74  -We will continue current medication regimen    7. Cerebrovascular accident (CVA), unspecified mechanism ([address])  -History of CVA with residual right-sided lower extremity weakness  -Patient is completing physical therapy after her knee replacements and reports improvement in her muscle strength  -We will continue aspirin 81    8.  Tardive dyskinesia  -Tardive dyskinesia secondary to medication side effects in the past  -We will continue Ingrezza 40 mg daily    9. Leg swelling  -Patient reports 1 to 2-week history of lower extremity swelling equal bilaterally; she reports she may have had increased salt intake at this time but does not report any changes in activity  -Last echocardiogram showed 60% EF no wall motion normalities, no diastolic normalities, no valvular dysfunction with GFR of 95   -Trace lower extremity edema on exam with no significant varicosities  - Discussed avoiding salty foods and elevating legs and can attempt compression stockings if swelling persist  - Discussed making appointment if the swelling gets worse or fails to improve    10. Chronic pain disorder  -History of chronic pain managed pregabalin 50, Robaxin as needed, and Cymbalta  -Patient is requesting refill on Cymbalta 60 mg twice daily however is not prescribed by PCP not listed on medication list; she has brought in her pill bottle  - recommended request refills from psychiatry also avoid polypharmacy  -We will prescribe short course of Cymbalta as to avoid withdrawal if not able to get prescription from psychiatry         Patient Instructions   1) Please contact your psychiatrist for refills on your cymbalta   2) please follow with GI for colonoscopy  3) please follow with Gynecologist         No follow-ups on file. Subjective:      Patient ID: Mag Edward is a 59 y.o. female. Chief Complaint   Patient presents with   • Follow-up     Medication check       The patient is a 24-year-old female with past medical history of transient ischemic stroke, hypertension, panic disorder, recent bilateral total knee replacements, and CVA With residual left-sided lower extremity weakness. She is presenting for follow-up of chronic conditions and medication refill. She states she has been taking all her medications as prescribed in addition to Cymbalta which is not listed in her chart.   She states she has previous prescribed this by her psychiatrist who is going to help. She takes Cymbalta for muscle spasms and is about to run out. She most recently saw her psychiatrist on August 28 and is scheduled for follow-up in 1 month. She also states she is taking Robaxin as needed for this pain. She also states she has severe panic attacks and takes Ativan and scheduled daily. She also states she has constant anxiety that is well controlled with current medication regimen. She is taking Ingrezza for tardive dyskinesia. She states she is aware that she is due for colorectal cancer screening and is agreeable to colonoscopy. She states she is following up with her dentist for dentures soon. She is also in the process of getting a CPAP for MIMI. The following portions of the patient's history were reviewed and updated as appropriate: allergies, current medications, past family history, past medical history, past social history, past surgical history and problem list.    Review of Systems   Constitutional: Negative for chills, fatigue and fever. Respiratory: Negative for cough, shortness of breath and wheezing. Cardiovascular: Negative for chest pain and leg swelling. Gastrointestinal: Negative for abdominal distention, abdominal pain, constipation, diarrhea, nausea and vomiting. Genitourinary: Negative for difficulty urinating. Neurological: Negative for dizziness and light-headedness.          Current Outpatient Medications   Medication Sig Dispense Refill   • amLODIPine (NORVASC) 10 mg tablet TAKE 1 TABLET BY MOUTH EVERY DAY 90 tablet 2   • Aspirin Low Dose 81 MG EC tablet TAKE 1 TABLET BY MOUTH EVERY DAY 90 tablet 3   • atorvastatin (LIPITOR) 40 mg tablet TAKE 1 TABLET BY MOUTH EVERY DAY 90 tablet 3   • clonazePAM (KlonoPIN) 0.5 mg tablet Take 1 tablet (0.5 mg total) by mouth 2 (two) times a day 60 tablet 0   • DULoxetine (CYMBALTA) 30 mg delayed release capsule Take 1 capsule (30 mg total) by mouth 2 (two) times a day 30 capsule 1   • ferrous sulfate 324 (65 Fe) mg Take 1 tablet (324 mg total) by mouth daily 30 tablet 1   • Ingrezza 40 MG CAPS Take 40 mg by mouth in the morning 30 capsule 5   • LORazepam (ATIVAN) 1 mg tablet Take 0.5 tablets (0.5 mg total) by mouth every 6 (six) hours as needed for anxiety for up to 15 days 30 tablet 0   • methocarbamol (ROBAXIN) 500 mg tablet Take 1 tablet (500 mg total) by mouth 2 (two) times a day as needed for muscle spasms 10 tablet 0   • mirtazapine (REMERON) 15 mg tablet Take 1 tablet (15 mg total) by mouth daily at bedtime 30 tablet 0   • omeprazole (PriLOSEC) 20 mg delayed release capsule Take 20 mg by mouth daily One tablet daily     • pregabalin (LYRICA) 50 mg capsule 1 capsule twice a day. May increase to 1 capsule 3 times per day if needed. 90 capsule 3   • QUEtiapine (SEROquel) 50 mg tablet Take 1 tablet (50 mg total) by mouth 4 (four) times a day (with meals and at bedtime) 120 tablet 0   • docusate sodium (COLACE) 100 mg capsule Take 1 capsule (100 mg total) by mouth every 12 (twelve) hours (Patient not taking: Reported on 9/1/2023) 60 capsule 0     No current facility-administered medications for this visit. Objective:    /74 (BP Location: Left arm, Patient Position: Sitting, Cuff Size: Large)   Pulse 89   Temp 97.7 °F (36.5 °C) (Temporal)   Wt 78.5 kg (173 lb)   BMI 32.69 kg/m²        Physical Exam  Constitutional:       General: She is not in acute distress. Appearance: Normal appearance. She is normal weight. She is not ill-appearing or toxic-appearing. HENT:      Head: Normocephalic and atraumatic. Mouth/Throat:      Mouth: Mucous membranes are dry. Comments: tardive dyskinesia   Cardiovascular:      Rate and Rhythm: Normal rate and regular rhythm. Heart sounds: No murmur heard. No gallop. Pulmonary:      Effort: Pulmonary effort is normal. No respiratory distress. Breath sounds: No wheezing or rales. Abdominal:      General: Abdomen is flat.  There is no distension. Tenderness: There is no abdominal tenderness. There is no guarding. Musculoskeletal:      Right lower leg: Edema (trace) present. Left lower leg: Edema (trace) present. Neurological:      Mental Status: She is alert and oriented to person, place, and time.       Comments: RLE 4/5 muscle strength   LUE 5/5    Psychiatric:         Mood and Affect: Mood normal.         Behavior: Behavior normal.                Lucita Harrington, [address] [place] [address] Internal Medicine PGY-2

## 2023-09-01 NOTE — PROGRESS NOTES
Outpatient Care Management Note:    Patient at PCP office today to follow up. I met with patient after her appointment along with , Siva Hodgson. Patient using rollator today to ambulate. Present with her is her son in law/paid care giver Annel. He reports new phone number [phone] (chart updated with his new contact number). Patient reports she is doing well and moving around much better and has 2-3 more sessions of outpatient physical therapy left. She reports no recent falls, able to do steps, and is now going the [facility] to exercise through the sliver sneakers program. Encouraged to continue to remain active and exercise. Patient requesting a new rollator. Per chart review patient received a walker last year and insurance will not cover another one. I did give [address] for [address] [place] [address] for a donated/used rollator. Son will help her call and look into this. Patient reports recent increase with her anxiety and panic attacks and reports her mental health provider with [address] [place] [address] Avenue increased her Seroquel and reports she is now on Klonopin twice a day and Lorazepam three times a day. She is seeing the psychiatrist and has another appointment with them this month. Encouraged routine follow up. Patient has a  with Jana Huston at her mental health office [phone] that she reports is assisting her with getting into [facility]. Patient reports her goal is to be on her own and possibly have the support of the PA waiver program and to have her own place to live in near future. Patient currently living with her daughter, son in law and their 11 young children who are home schooled and patient would like to have a more private and quiet environment and be more independent. Explained to patient that it is important to have a plan in place and stable, accessible, affordable housing and resources in place.  Patient and son given list of housing that [facility], Ashwini Katz previously helped her apply for and they will call to see if she is still on waitlist, where she is on waitlist, and update contact information. Patient given information regarding Brina Sweeney in [facility] and made aware this is a personal care home. Son in law aware patient would like to move and be on her own but reports this is a conversation and discussion she has been having with her daughter and is more aware of it than he is. Patient's  with SHAKILA cordova is [place] at [phone]. Email [email or web address]. Patient to see if she would be able to keep her waiver services if she goes into the [facility] transitional living program or to a personal care home. Patient will be using [facility] at [employer] starting next week and will be getting her mental health medication bubble packed from them. Patient reports she would like to see how that goes and will consider adding her other medication to the bubble packing. Encouraged to keep to one pharmacy and encouraged bubble packing. Patient will update PCP office if she changes pharmacies for her other medications so we can update it in her chart but for now would like to continue to use [facility].     Patient did later call the office regarding her Robaxin and that she only got 10 tablets for a 5 day supply and if this will be enough. Patient instructed that she should only be taking this twice a day if she really needs it and that she doesn't have to take it or can only take it once a day if needed. I made her aware this is not a medication she should be taking everyday or long term. Per chart review patient also out of Cymbalta and was filled today at a reduced dose until reassessed by psychiatry. Patient to provide update in 1 week if she does not note any improvement. Patient to follow up with psychiatrist regarding further refills on her Cymbalta.      Patient does not have any further questions, concerns, or other needs at this time. Pt has my contact # and PCP office # if needed. Pt is agreeable for further outreach. Please see provider note and  note for additional information.

## 2023-09-01 NOTE — PATIENT INSTRUCTIONS
1) Please contact your psychiatrist for refills on your cymbalta   2) please follow with GI for colonoscopy  3) please follow with Gynecologist

## 2023-09-01 NOTE — PROGRESS NOTES
HIRAM and Vj Montana RN/CM have met with pt this date to f/u on care issues and SW to assist with community resources. Pt had come to the appointment with her son in law and PA Waiver caregiver Annel. Pt shared with CM Team and Son in law that she is interested in more independent living in the future. CM asked for an Update from her [address] [place] [address] Provider [address] re  the [facility] Referral which pt had requested. She shared she was not approved. We did discussed that  since they are just temporary program they may need to know her future housing options. SW Inquired if she still has an ICM thru [facility] and pt was not certain. She was did share her  is Kiley Andrews [phone]  / [email or web address] . We reviewed that her [facility] Provider and an ICM could help with additional Housing options. .  SW did suggest she may want to look into [facility] @ [address] [place] [address]. Lars Foy is their  Admission director [phone]. SW did caution that she would need to see if she would be allowed to have her PA Waiver Program services if she was in a personal  care boarding home? Pt was receptive to find out more so at her request  SW assisted with a call to Carole of admissions as requested she call pt back. # provided for pt/ family to call back as well. In addition SW reviewed that a # of Housing applications have been completed in the past.   Pt does not know which one or where she stands. HIRAM has provide info from the [facility] # [phone] that shares she is # [phone] on the one bedroom list.    HIRAM attempted to reach the [address] [place] [address] [phone] but the answering service picked up. Pt/ family to call them back. HUNTER did point out they have a RN in this Building. In addition  HIRAM provided them contact info on [facility] [phone] as well as [employer] [phone].    Pt/ family to call and see where she is on their  list.  HIRAM stressed pt shpuld call every 6 months to check where  she is on the wait and keep her # and address updated so she does not miss a chance. These are apartments so pt could keep her  [address] for these locations. HIRAM did stress the  recommendation to keep the [employer] active as they took so long to obtain and are there to keep her independent and safe. With pt's permission HIRAM has called [address] Behavioral Case Manger  [phone]. With pt's permission HIRAM reviewed above and asked if pt has an ICM? It appears she does not but Ms. Jaky Hopson can help her apply at her next [facility] appointment 9/17/23. She is also scheduled/28/23. She does not yet have a therapist.  She confirmed pt not approved forth TLC Program as they did not feel she was appropriate for same.     SW to f/u with pt's  and [facility]  to see if she can get an ICM to further assist with her housing request.

## 2023-09-05 ENCOUNTER — PATIENT OUTREACH (OUTPATIENT)
Dept: INTERNAL MEDICINE CLINIC | Facility: CLINIC | Age: 58
End: 2023-09-05

## 2023-09-05 ENCOUNTER — APPOINTMENT (OUTPATIENT)
Dept: PHYSICAL THERAPY | Facility: CLINIC | Age: 58
End: 2023-09-05
Payer: MEDICARE

## 2023-09-05 NOTE — PROGRESS NOTES
SW has attempted to return call to pt who has left 2 messages for SW .  Pt is asking for help with Housing options. Pt was asking SW to a call Nancy Ramo at [facility] @ [phone]    SW not able to make out the rest of her request.   SW has also attempted to reach pt's son in law and Caregiver [phone] who was with pt at recent visit but SW had to leave a message. SW did reach out to [facility] [employer] and she shares she is the new manger and has received a few calls from pt who is interested in moving there. SW has asked if pt is able to be on the PA Waiver Program in their Facility. She will look into same. She would need medical records. SW has suggested pt tour the Facility an she shares she will be available next week for tours. SW will f/u with pt/ family re same.

## 2023-09-06 ENCOUNTER — PATIENT OUTREACH (OUTPATIENT)
Dept: INTERNAL MEDICINE CLINIC | Facility: CLINIC | Age: 58
End: 2023-09-06

## 2023-09-06 NOTE — PROGRESS NOTES
This writer spoke with the patient's son Catarina Camacho  He discussed patient mobility  He states she utilizes a wheelchair and a walker at home  He reports there are places in the apartment that she cannot use the wheelchair because of the layout, therefore she will utilize the walker  He reports he administers her medication daily  He reports she can return to live with him upon discharge  He reports he has to provide all care  He reports he prepare the meals  He reports that she struggles to complete her ADL's at times  He reports he is on FMLA to care for her  Infliximab Counseling:  I discussed with the patient the risks of infliximab including but not limited to myelosuppression, immunosuppression, autoimmune hepatitis, demyelinating diseases, lymphoma, and serious infections.  The patient understands that monitoring is required including a PPD at baseline and must alert us or the primary physician if symptoms of infection or other concerning signs are noted.

## 2023-09-06 NOTE — PROGRESS NOTES
SW has received a call from pt this AM re her request to help with the Referral to [address] [place] [address] did review that it is best for her also to meet with her [address] Provider. SW realized SW had the wrong date recorded for her next meeting and pt did not know that date. SW with pt's permission spoke to pt's son in law and then dgt. They confirmed the pt's next appointment with  [facility] Providers is  9/19/23. [address] [place] [address] Drive  direct [phone] plans to meet with pt to assist with Housing and Placement options and ICM Referrals. SW did review with dgt that pt is very intested in the referral and indicated she needs to move by October. Dgt shares that they are relocating then but pt is able to be with them until she finds a safe and suitable place. SW did review that Anali with [facility] will offer a tour next week and will let us know  if pt can use the PA Waiver Program there in their [address] [place] [address]. HIRAM [facility] advised dgt to call pt  to confirm as well. Pt has given verbal permission to fax medal into to [address] [place] [address] for their referral if needed. Sw assisted with a 3 way call to [facility] @ [facility] but she was in a meeting. She will get back to us when a tour is possible an if the PA Waiver is allowed. Dgt has #'s for Anali at [address] [place] [address] and Pt's  at [address] to f/u with them as neded. SW did strongly recommend seeing he facility to see if pt would be appropriate and comfortable and especially recommended keeping the PA Waiver Program as it was very difficult and lengthy to obtain. .    SW received a message from pt who shares that she and her family had a good talk and she will be staying from them. She would prefer that then paying to be elsewhere and sharing a room with someone. Jatinder Pedersen

## 2023-09-07 ENCOUNTER — OFFICE VISIT (OUTPATIENT)
Dept: PHYSICAL THERAPY | Facility: CLINIC | Age: 58
End: 2023-09-07
Payer: MEDICARE

## 2023-09-07 ENCOUNTER — PATIENT OUTREACH (OUTPATIENT)
Dept: INTERNAL MEDICINE CLINIC | Facility: CLINIC | Age: 58
End: 2023-09-07

## 2023-09-07 DIAGNOSIS — Z96.653 STATUS POST BILATERAL KNEE REPLACEMENTS: ICD-10-CM

## 2023-09-07 DIAGNOSIS — Z96.653 TOTAL KNEE REPLACEMENT STATUS, BILATERAL: Primary | ICD-10-CM

## 2023-09-07 PROCEDURE — 97110 THERAPEUTIC EXERCISES: CPT

## 2023-09-07 PROCEDURE — 97530 THERAPEUTIC ACTIVITIES: CPT

## 2023-09-07 PROCEDURE — 97112 NEUROMUSCULAR REEDUCATION: CPT

## 2023-09-07 NOTE — PROGRESS NOTES
Daily Note     Today's date: 2023  Patient name: Sofya Alvarez  : 1965  MRN: [id number]  Referring provider: Tahira Loza  Dx:   Encounter Diagnosis     ICD-10-CM    1. Total knee replacement status, bilateral  Z96.653       2. Status post bilateral knee replacements  Z96.653           Start Time: 0740  Stop Time: 0825  Total time in clinic (min): 45 minutes    Subjective: Pt reports that she is feeling ok today, no new c/o pain or symptoms. Pt questions height of rollator handles. Objective: See treatment diary below      Assessment: Pt tolerated today's session well with no adverse symptoms. Adjusted handle height and performed gait training with higher handles. Pt continues to work towards goals of increased LE strength and control. Pt demonstrates good knowledge of exercises, requiring min VCing for count and hold of exercises. Patient exhibited good technique with therapeutic exercises and would benefit from continued PT. Continue to progress as able. Plan: Continue per plan of care. Progress treatment as tolerated.        Precautions: s/p TKA (R TKA 3/9/22, L TKA 22)    Date    Visit # 11 12 13 14 15 16 17 18 19 20   FOTO   Done (d/c) -- --        Re-eval   DK            Manuals    B/l knee PROM R PK       PWK     Hip flexor str       DK PWK     Quad STM       DK w roller PWK                  Neuro Re-Ed  8   Quad sets   20x5" ea sup    20x3" ea sup  20x3" ea sup 20x3" ea sup   clamshells             LAQ  2x10 3#  3x10 3# 3x10 3# 3x10 3# 3x10 2# 3" hold  3x10 2# 3" hold 3x10 2# 3" hold   SAQ 2x10 5" ea 2# 2x10 3#     2x10 3" hold ea 2#  3x10 2# 3" hold 3x10 2# 3" hold   TKE             bridges 10x   15x 2 x 10 2x10 20x2"  20x2" 20x2"   Weight shifts             SLS             Ther Ex  8 bike 6' 6' 6' 6' 6' 6' 6' 6' 6' 6'   Leg press 75# 20x  95# 2x10 2x15 105# 2x15 105# 2x15 107# 2x15 109# 3x10 115# 3x10 125# 3x10 125# 3x10 125#   SL leg press 45# 20x  45# 20x  2x10 55#  ea 2x15 57#  ea 2x15 57#  ea 2x15 59#  ea 3x10 65# ea 2x10 75# 2x10 75# 2x10 75#   Seated march 2x10 3# 2x10 3#  3x10 3#  3x10 3# 2x10 PTB manu resist      Glut sets 15x5"            Hamstring curl stand             Standing hip abd/ext 10x2 ea OTB 10x2 ea OTB 2x10 ea OTB 2x15 ea 2#  2x15 ea 2# 2x10 ea 2# 2x15 ea 2# 2x15 ea 2# 2x15 ea 2#   Hamstring stretch             Supine heel slide             SLR  x 10   2 x 10 ea 2 x 10 ea 2x10 ea 2x10 2# ea w QS 2x10 2#  2x10 2#  2x10 2#    Side steps   2 laps OTB          Ther Activity 7/26 7/31 8/9 8/16 8/21 8/23 8/28 8/31 9/7   Sit to stands       2x10 w 2" glute set at top      Pt edu             Step ups             Gait Training 7/26 7/31 8/9 8/16 8/21 8/23 8/28 8/31 9/7   Walking with walker    2 laps RW c higher handle, 1 lap c       2 laps RW c higher handle, 1 lap c    Walking with cane             Obstacle course with walker                          Modalities 7/26 7/31 8/9 8/16 8/21

## 2023-09-07 NOTE — PROGRESS NOTES
SW received another return call from pt confirming she will move with pt to their new apt and wait until there is an opening fr her at the [facility]. Pt is now # 24 which is more hopefull. SW attmepted to reach pt re same and acknowledge SW received her message. However SW had to leave a message for her.

## 2023-09-08 ENCOUNTER — TELEPHONE (OUTPATIENT)
Dept: INTERNAL MEDICINE CLINIC | Facility: CLINIC | Age: 58
End: 2023-09-08

## 2023-09-08 DIAGNOSIS — M62.838 MUSCLE SPASM: ICD-10-CM

## 2023-09-08 RX ORDER — METHOCARBAMOL 500 MG/1
500 TABLET, FILM COATED ORAL 2 TIMES DAILY PRN
Qty: 10 TABLET | Refills: 0 | Status: CANCELLED | OUTPATIENT
Start: 2023-09-08

## 2023-09-08 NOTE — TELEPHONE ENCOUNTER
Patient called in to request a refill for Diclofenac Sodium (VOLTAREN) 1 %.  This medication is no longer in current med list

## 2023-09-11 ENCOUNTER — NURSE TRIAGE (OUTPATIENT)
Dept: OTHER | Facility: OTHER | Age: 58
End: 2023-09-11

## 2023-09-11 ENCOUNTER — HOSPITAL ENCOUNTER (EMERGENCY)
Facility: HOSPITAL | Age: 58
Discharge: HOME | End: 2023-09-11
Attending: EMERGENCY MEDICINE
Payer: MEDICARE

## 2023-09-11 ENCOUNTER — OFFICE VISIT (OUTPATIENT)
Dept: PHYSICAL THERAPY | Facility: CLINIC | Age: 58
End: 2023-09-11
Payer: MEDICARE

## 2023-09-11 ENCOUNTER — OFFICE VISIT (OUTPATIENT)
Dept: INTERNAL MEDICINE CLINIC | Facility: CLINIC | Age: 58
End: 2023-09-11

## 2023-09-11 ENCOUNTER — PATIENT OUTREACH (OUTPATIENT)
Dept: INTERNAL MEDICINE CLINIC | Facility: CLINIC | Age: 58
End: 2023-09-11

## 2023-09-11 VITALS
HEART RATE: 72 BPM | BODY MASS INDEX: 32.7 KG/M2 | OXYGEN SATURATION: 97 % | DIASTOLIC BLOOD PRESSURE: 88 MMHG | RESPIRATION RATE: 20 BRPM | WEIGHT: 173.06 LBS | TEMPERATURE: 98.5 F | SYSTOLIC BLOOD PRESSURE: 175 MMHG

## 2023-09-11 VITALS
WEIGHT: 173 LBS | BODY MASS INDEX: 32.66 KG/M2 | SYSTOLIC BLOOD PRESSURE: 170 MMHG | TEMPERATURE: 98 F | HEIGHT: 61 IN | DIASTOLIC BLOOD PRESSURE: 109 MMHG | OXYGEN SATURATION: 97 % | HEART RATE: 88 BPM

## 2023-09-11 DIAGNOSIS — K21.9 GERD (GASTROESOPHAGEAL REFLUX DISEASE): ICD-10-CM

## 2023-09-11 DIAGNOSIS — Z96.653 STATUS POST BILATERAL KNEE REPLACEMENTS: ICD-10-CM

## 2023-09-11 DIAGNOSIS — F41.0 PANIC DISORDER WITHOUT AGORAPHOBIA: Primary | ICD-10-CM

## 2023-09-11 DIAGNOSIS — Z96.653 TOTAL KNEE REPLACEMENT STATUS, BILATERAL: Primary | ICD-10-CM

## 2023-09-11 DIAGNOSIS — F41.9 ANXIETY: Primary | ICD-10-CM

## 2023-09-11 DIAGNOSIS — F31.60 BIPOLAR AFFECTIVE DISORDER, CURRENT EPISODE MIXED, CURRENT EPISODE SEVERITY UNSPECIFIED (HCC): ICD-10-CM

## 2023-09-11 DIAGNOSIS — M17.12 PRIMARY OSTEOARTHRITIS OF LEFT KNEE: ICD-10-CM

## 2023-09-11 DIAGNOSIS — M62.838 MUSCLE SPASM: ICD-10-CM

## 2023-09-11 PROCEDURE — 93005 ELECTROCARDIOGRAM TRACING: CPT

## 2023-09-11 PROCEDURE — 99284 EMERGENCY DEPT VISIT MOD MDM: CPT | Performed by: EMERGENCY MEDICINE

## 2023-09-11 PROCEDURE — 99283 EMERGENCY DEPT VISIT LOW MDM: CPT

## 2023-09-11 PROCEDURE — 97110 THERAPEUTIC EXERCISES: CPT

## 2023-09-11 PROCEDURE — 97530 THERAPEUTIC ACTIVITIES: CPT

## 2023-09-11 PROCEDURE — 99213 OFFICE O/P EST LOW 20 MIN: CPT | Performed by: INTERNAL MEDICINE

## 2023-09-11 PROCEDURE — 3080F DIAST BP >= 90 MM HG: CPT | Performed by: INTERNAL MEDICINE

## 2023-09-11 PROCEDURE — 97112 NEUROMUSCULAR REEDUCATION: CPT

## 2023-09-11 PROCEDURE — 3077F SYST BP >= 140 MM HG: CPT | Performed by: INTERNAL MEDICINE

## 2023-09-11 RX ORDER — LORAZEPAM 1 MG/1
2 TABLET ORAL ONCE
Status: COMPLETED | OUTPATIENT
Start: 2023-09-11 | End: 2023-09-11

## 2023-09-11 RX ORDER — LORAZEPAM 0.5 MG/1
TABLET ORAL
Status: ON HOLD | COMMUNITY
Start: 2023-09-05

## 2023-09-11 RX ORDER — QUETIAPINE FUMARATE 50 MG/1
50 TABLET, FILM COATED ORAL
Qty: 120 TABLET | Refills: 0 | Status: ON HOLD | OUTPATIENT
Start: 2023-09-11 | End: 2023-10-11

## 2023-09-11 RX ORDER — DULOXETIN HYDROCHLORIDE 30 MG/1
30 CAPSULE, DELAYED RELEASE ORAL 2 TIMES DAILY
Qty: 180 CAPSULE | Refills: 1 | Status: SHIPPED | OUTPATIENT
Start: 2023-09-11 | End: 2023-09-12 | Stop reason: SDUPTHER

## 2023-09-11 RX ORDER — FERROUS SULFATE TAB EC 324 MG (65 MG FE EQUIVALENT) 324 (65 FE) MG
324 TABLET DELAYED RESPONSE ORAL EVERY OTHER DAY
Qty: 30 TABLET | Refills: 1 | Status: ON HOLD | OUTPATIENT
Start: 2023-09-11

## 2023-09-11 RX ORDER — METHOCARBAMOL 500 MG/1
500 TABLET, FILM COATED ORAL 2 TIMES DAILY PRN
Qty: 10 TABLET | Refills: 0 | Status: ON HOLD | OUTPATIENT
Start: 2023-09-11

## 2023-09-11 RX ORDER — DULOXETIN HYDROCHLORIDE 30 MG/1
30 CAPSULE, DELAYED RELEASE ORAL 2 TIMES DAILY
Qty: 180 CAPSULE | Refills: 1 | Status: SHIPPED | OUTPATIENT
Start: 2023-09-11 | End: 2023-09-11

## 2023-09-11 RX ADMIN — LORAZEPAM 2 MG: 1 TABLET ORAL at 19:47

## 2023-09-11 NOTE — TELEPHONE ENCOUNTER
Regarding: panic attack  ----- Message from Scott Ngo sent at 9/11/2023  6:26 PM EDT -----  " I am having a panic attack. I already took all my medications.  I don't want to go to the hospital. I don't know what else to do."

## 2023-09-11 NOTE — PROGRESS NOTES
Daily Note     Today's date: 2023  Patient name: David Benoit  : 1965  MRN: [id number]  Referring provider: Eben Arnold  Dx:   Encounter Diagnosis     ICD-10-CM    1. Total knee replacement status, bilateral  Z96.653       2. Status post bilateral knee replacements  Z96.653           Start Time: 0800  Stop Time: 0840  Total time in clinic (min): 40 minutes    Subjective: Pt reports that she is feeling ok today, no new c/o pain or symptoms. Pt states she is feeling sore due to exercising at the gym. Objective: See treatment diary below      Assessment: Pt tolerated today's session well with no adverse symptoms. Pt continues to work towards goals of increased LE strength and flexibility. Pt performed increased exercises and intensity today, with no adverse symptoms. Patient exhibited good technique with therapeutic exercises and would benefit from continued PT. Continue to progress as able. Plan: Continue per plan of care. Progress treatment as tolerated.        Precautions: s/p TKA (R TKA 3/9/22, L TKA 22)    Date    Visit # 21    15 16 17 18 19 20   FOTO     --        Re-eval               Manuals    B/l knee PROM        PWK     Hip flexor str       DK PWK     Quad STM       DK w roller PWK                  Neuro Re-Ed    Quad sets 20x3" ea sup      20x3" ea sup  20x3" ea sup 20x3" ea sup   clamshells             LAQ 3x10 2# 3" hold    3x10 3# 3x10 3# 3x10 2# 3" hold  3x10 2# 3" hold 3x10 2# 3" hold   SAQ 3x10 2# 3" hold      2x10 3" hold ea 2#  3x10 2# 3" hold 3x10 2# 3" hold   TKE             bridges 20x2"    2 x 10 2x10 20x2"  20x2" 20x2"   Weight shifts             SLS             Ther Ex    bike 6'    6' 6' 6' 6' 6' 6'   Leg press 3x10 125#    2x15 107# 2x15 109# 3x10 115# 3x10 125# 3x10 125# 3x10 125#   SL leg press 2x10 75#    2x15 57#  ea 2x15 59#  ea 3x10 65# ea 2x10 75# 2x10 75# 2x10 75#   Seated march 2x10 standing      3x10 3# 2x10 PTB manu resist      Glut sets             Hamstring curl stand 2x10            Standing hip abd/ext 2x15 ea 2#     2x15 ea 2# 2x10 ea 2# 2x15 ea 2# 2x15 ea 2# 2x15 ea 2#   Hamstring stretch             Mini squats 2x10            SLR 2x10 2#     2 x 10 ea 2x10 ea 2x10 2# ea w QS 2x10 2#  2x10 2#  2x10 2#    Side steps PTB 3 laps            Ther Activity 9/11 8/16 8/21 8/23 8/28 8/31 9/7   Sit to stands       2x10 w 2" glute set at top      Pt edu             Step ups             Gait Training 9/11 8/16 8/21 8/23 8/28 8/31 9/7   Walking with walker 2 laps RW         2 laps RW c higher handle, 1 lap c    Walking with cane             Obstacle course with walker                          Modalities 9/11 8/16 8/21

## 2023-09-11 NOTE — PROGRESS NOTES
[address] Visit Note  Samantha Toledo 59 y.o. female   MRN: [id number]    Assessment and Plan      1. Panic disorder without agoraphobia: Patient with uncontrolled anxiety and panic attacks, having panic attacks once weekly with one prior to office visit today. She states her trigger is running out of her mediations (psych meds, lorazepam and clonazepam). She requests increase in her Cymbalta to 60 mg twice daily. She brought some of her medications with her to the office today which were reviewed in detail. She is taking lorazepam three times daily, and clonazepam twice daily without relief. She is taking Seroquel three times daily even though prescription says 4 times daily. • Start taking 60 mg of Cymbalta in the morning and 30 mg of Cymbalta at night  • Start taking Seroquel 4 times a day   • Follow-up with [facility] psychiatry next week to discuss additional pharmacotherapy like adding SSRI (Zoloft or Lexapro); also recommend discussing the need to continue multiple benzodiazepines like lorazepam and clonazepam, will not send refill today   -     QUEtiapine (SEROquel) 50 mg tablet; Take 1 tablet (50 mg total) by mouth 4 (four) times a day (with meals and at bedtime)    2. GERD: Patient no longer having reflux symptoms. · Discontinue PPI; discussed possibility of rebound acid secretion and management strategies  · Follow-up at next visit regarding reflux symptoms    3. Primary osteoarthritis of left knee  -     Diclofenac Sodium (VOLTAREN) 1 %; Apply 2 g topically 4 (four) times a day  -     ferrous sulfate 324 (65 Fe) mg; Take 1 tablet (324 mg total) by mouth every other day    4. Bipolar affective disorder, current episode mixed, current episode severity unspecified (HCC)  -     QUEtiapine (SEROquel) 50 mg tablet; Take 1 tablet (50 mg total) by mouth 4 (four) times a day (with meals and at bedtime)    5.  Muscle spasm  -     DULoxetine (CYMBALTA) 30 mg delayed release capsule; Take 1 capsule (30 mg total) by mouth 2 (two) times a day Take 2 capsules (60 mg) every morning and 1 capsule (30 mg) at night        Follow up in our clinic in 6 month(s) for [address]. Subjective   HISTORY OF PRESENT ILLNESS:  Samantha Elliott is a 59 y.o. female with a past medical history of anxiety with panic attacks, bilateral knee osteoarthritis s/p bilateral knee replacements, chronic pain syndrome, opioid dependence, tardive dyskinesia, hypertension, hyperlipidemia, and prior CVA who presents to clinic today to discuss medications. She reports having a panic attack in the [employer] ride here just prior to arrival. She is currently feeling better. She has about 1 panic attack per week on average. She states her trigger is running out of her mediations (psych meds, lorazepam and clonazepam). Her symptoms usually include extreme fear, feeling like she is dying, palpitations, and shortness of breath. She also requests increase in her Cymbalta to 60 mg twice daily. Her pain is diffuse and worsens with panic attacks. She brought some of her medications with her to the office today which were reviewed in detail. She is taking lorazepam three times daily, and clonazepam twice daily. She is taking Seroquel three times daily even though prescription says 4 times daily. She requests refill on Voltaren gel. Her reflux symptoms are improved and she is agreeable to discontinuing PPI. She will start taking oral iron every other day. Review of Systems - Negative other than stated above    Objective     Vitals:    09/11/23 1314   BP: (!) 170/109   BP Location: Left arm   Patient Position: Sitting   Cuff Size: Standard   Pulse: 88   Temp: 98 °F (36.7 °C)   TempSrc: Temporal   SpO2: 97%   Weight: 78.5 kg (173 lb)   Height: 5' 1" (1.549 m)       Physical Exam:  General: Obese. Appears anxious.  No apparent distress, resting comfortably   Head: Normocephalic, atraumatic  Eyes: Anicteric, no conjunctival erythema  ENT: External ear normal, no nasal discharge  Neck: Trachea midline, no visible lymphadenopathy or goiter  Respiratory: Clear to auscultation. Non-labored respirations, symmetric thorax expansion  Cardiovascular: Regular rate. Extremities appear well-perfused  Abdomen: Non-distended  Extremities: Moves extremities spontaneously, no peripheral edema  Skin: No visible rashes, wounds, or jaundice  Neuro: Tardive dyskinesia. A&O x 3, no gross focal deficits    History     Current Outpatient Medications:   •  amLODIPine (NORVASC) 10 mg tablet, TAKE 1 TABLET BY MOUTH EVERY DAY, Disp: 90 tablet, Rfl: 2  •  Aspirin Low Dose 81 MG EC tablet, TAKE 1 TABLET BY MOUTH EVERY DAY, Disp: 90 tablet, Rfl: 3  •  atorvastatin (LIPITOR) 40 mg tablet, TAKE 1 TABLET BY MOUTH EVERY DAY, Disp: 90 tablet, Rfl: 3  •  clonazePAM (KlonoPIN) 0.5 mg tablet, Take 1 tablet (0.5 mg total) by mouth 2 (two) times a day, Disp: 60 tablet, Rfl: 0  •  Diclofenac Sodium (VOLTAREN) 1 %, Apply 2 g topically 4 (four) times a day, Disp: 240 g, Rfl: 2  •  DULoxetine (CYMBALTA) 30 mg delayed release capsule, Take 1 capsule (30 mg total) by mouth 2 (two) times a day Take 2 capsules (60 mg) every morning and 1 capsule (30 mg) at night, Disp: 180 capsule, Rfl: 1  •  ferrous sulfate 324 (65 Fe) mg, Take 1 tablet (324 mg total) by mouth every other day, Disp: 30 tablet, Rfl: 1  •  Ingrezza 40 MG CAPS, Take 40 mg by mouth in the morning, Disp: 30 capsule, Rfl: 5  •  methocarbamol (ROBAXIN) 500 mg tablet, Take 1 tablet (500 mg total) by mouth 2 (two) times a day as needed for muscle spasms, Disp: 10 tablet, Rfl: 0  •  mirtazapine (REMERON) 15 mg tablet, Take 1 tablet (15 mg total) by mouth daily at bedtime, Disp: 30 tablet, Rfl: 0  •  pregabalin (LYRICA) 50 mg capsule, 1 capsule twice a day. May increase to 1 capsule 3 times per day if needed. , Disp: 90 capsule, Rfl: 3  •  QUEtiapine (SEROquel) 50 mg tablet, Take 1 tablet (50 mg total) by mouth 4 (four) times a day (with meals and at bedtime), Disp: 120 tablet, Rfl: 0  •  LORazepam (ATIVAN) 0.5 mg tablet, , Disp: , Rfl:   •  LORazepam (ATIVAN) 1 mg tablet, Take 0.5 tablets (0.5 mg total) by mouth every 6 (six) hours as needed for anxiety for up to 15 days, Disp: 30 tablet, Rfl: 0  No Known Allergies   Past Medical History:   Diagnosis Date   • Anxiety    • Arthritis     left knee   • Arthritis of right knee 10/6/2020   • At risk for falls    • Bipolar 2 disorder ([address])     FOLLOWS WITH PSYCHIATRIST. CONTINUE LAMOTRIGINE; RESOLVED: [id number]   • Depression    • Familial tremor     both hands   • Fibromyalgia     LAST ASSESSED: [id number]   • GERD (gastroesophageal reflux disease)    • Hearing aid worn     left ear   • [place] (hard of hearing)     left ear   • Hyperlipidemia    • Hypertension    • Left-sided weakness    • Lumbar disc disease with radiculopathy 2/2/2018   • Memory loss of unknown cause     long and short term   • Migraine    • Multiple closed fractures of metatarsal bone of right foot 5/2/2021   • Obesity    • Obesity, Class II, BMI 35-39.9    • Osteoarthritis of both hips 10/31/2016   • Osteoarthritis of knee 2/20/2013    Description: Continue Tylenol and Naproxen. Encourage exercise and weight loss. Patient refused physical therapy. I will refer the patient back to Orthopedics.    • Overactive bladder    • Panic attack    • Patellofemoral disorder of both knees 5/1/2020   • Post traumatic stress disorder    • Primary localized osteoarthritis of both knees 6/16/2017   • Primary osteoarthritis of both knees 5/1/2020   • S/P insertion of spinal cord stimulator     no remote   • S/P total knee arthroplasty, right 3/10/2022   • Sacroiliitis ([address]) 2/28/2017   • Seasonal allergies    • Seizure-like activity ([address]) 6/3/2022   • Seizures ([address])     possible seizure like activity   • Small bowel obstruction ([address]) 3/24/2023   • Status post total knee replacement, left 7/5/2022   • Stroke [facility])     questionable stroke 2009 • Tardive dyskinesia     PATIENT STATES   • 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   CONT ASA 81 MG DAILY; RESOLVED: [id number]   • Urinary incontinence    • Uses walker    • Wears dentures     partial lower / full upper- doesnt wear   • Wears glasses      Past Surgical History:   Procedure Laterality Date   • BACK SURGERY     •  SECTION     • COLONOSCOPY      RESOLVED: [id number]   • EAR SURGERY     • EGD     • HYSTERECTOMY     • MYRINGOTOMY W/ TUBES Left    • NECK SURGERY  2019   • ME ARTHRP KNE CONDYLE&PLATU MEDIAL&LAT COMPARTMENTS Right 3/9/2022    Procedure: ARTHROPLASTY KNEE TOTAL;  Surgeon: Tulio Adrian DO;  Location[facility];  Service: Orthopedics   • ME ARTHRP KNE CONDYLE&PLATU MEDIAL&LAT COMPARTMENTS Left 2022    Procedure: ARTHROPLASTY KNEE TOTAL;  Surgeon: Tulio Adrian DO;  [facility];  Service: Orthopedics   • ME CYSTOURETHROSCOPY N/A 2016    Procedure: CYSTOSCOPY, BOTOX INJECTION;  Surgeon: Marylene Ladd, MD;  Location: [facility];  Service: Gynecology   • ME INSJ/RPLCMT SPI NPGR DIR/INDUXIVE COUPLING Right 2/10/2021    Procedure: REPLACEMENT IMPLANTABLE PULSE GENERATOR DORSAL SPINAL COLUMN STIMULATOR, RIGHT;  Surgeon: Lukasz Contreras MD;  Location: [facility];  Service: Neurosurgery   • ME PRQ IMPLTJ NSTIM ELECTRODE ARRAY EPIDURAL Right 2020    Procedure: INSERTION THORACIC DORSAL COLUMN SPINAL CORD STIMULATOR PERCUTANEOUS W IMPLANTABLE PULSE GENERATOR, RIGHT;  Surgeon: Lukasz Contreras MD;  Location: [facility];  Service: Neurosurgery   • TONSILLECTOMY     • TUBAL LIGATION     • UPPER GASTROINTESTINAL ENDOSCOPY  2020     Social History     Socioeconomic History   • Marital status:      Spouse name: Not on file   • Number of children: 2   • Years of education: graduate school    • Highest education level: Not on file   Occupational History   • Occupation: Disabled   Tobacco Use   • Smoking status: Never   • Smokeless tobacco: Never   Vaping Use   • Vaping Use: Never used   Substance and Sexual Activity   • Alcohol use: Not Currently     Comment: 2 x year; being a social drinker as per all scripts    • Drug use: No   • Sexual activity: Not Currently   Other Topics Concern   • Not on file   Social History Narrative    Bereavement    Daily caffeine consumption, 6-8 servings per day     as per all scripts    Lives in [facility] Strain: Low Risk  (2/17/2023)    Overall Financial Resource Strain (CARDIA)    • Difficulty of Paying Living Expenses: Not hard at all   Food Insecurity: No Food Insecurity (7/19/2023)    Hunger Vital Sign    • Worried About Running Out of Food in the Last Year: Never true    • Ran Out of Food in the Last Year: Never true   Transportation Needs: No Transportation Needs (7/19/2023)    PRAPARE - Transportation    • Lack of Transportation (Medical): No    • Lack of Transportation (Non-Medical): No   Physical Activity: Inactive (3/9/2021)    Exercise Vital Sign    • Days of Exercise per Week: 0 days    • Minutes of Exercise per Session: 0 min   Stress: Not on file   Social Connections:  Moderately Isolated (3/9/2021)    Social Connection and Isolation Panel [NHANES]    • Frequency of Communication with Friends and Family: More than three times a week    • Frequency of Social Gatherings with Friends and Family: More than three times a week    • Attends Baptism Services: More than 4 times per year    • Active Member of Clubs or Organizations: No    • Attends Club or Organization Meetings: Never    • Marital Status:    Intimate Partner Violence: Not At Risk (3/9/2021)    Humiliation, Afraid, Rape, and Kick questionnaire    • Fear of Current or Ex-Partner: No    • Emotionally Abused: No    • Physically Abused: No    • Sexually Abused: No   Housing Stability: Low Risk  (7/19/2023)    Housing Stability Vital Sign    • Unable to Pay for Housing in the Last Year: No    • Number of Places Lived in the Last Year: 2    • Unstable Housing in the Last Year: No     Family History   Problem Relation Age of Onset   • Colon cancer Mother    • Alzheimer's disease Father    • Stroke Father    • Colon cancer Brother    • Bipolar disorder Brother    • Breast cancer Maternal Aunt    • Colon cancer Maternal Aunt    • Heart disease Other    • Diabetes Other    • Hypertension Other    • Seizures Son    • Depression Paternal Grandfather    • No Known Problems Sister    • No Known Problems Brother    • Thyroid disease Neg Hx        Glo Valente DO  [facility] Internal Medicine PGY-3  [address] [place] [address]  [address] [facility], [address]    PLEASE NOTE:  This encounter was completed utilizing the [employer] Direct Speech Voice Recognition Software. Grammatical errors, random word insertions, pronoun errors and incomplete sentences are occasional consequences of the system due to software limitations, ambient noise and hardware issues. These may be missed by proof reading prior to affixing electronic signature. Any questions or concerns about the content, text or information contained within the body of this dictation should be directly addressed to the physician for clarification. Please do not hesitate to call me directly if you have any any questions or concerns.

## 2023-09-11 NOTE — PROGRESS NOTES
Noted. Patient will plan to stay with daughter/family until apartment becomes available at [facility] per Augusta Alpers, .

## 2023-09-11 NOTE — PATIENT INSTRUCTIONS
Start taking 60 mg of Cymbalta in the morning and 30 mg of Cymbalta at night  Start taking Seroquel 4 times a day   See your psychiatrist next week to discuss additional medication options like adding SSRI (Zoloft, Lexapro)  Ask your psychiatrist about the need to continue multiple benzodiazepines like lorazepam and clonazepam   Start taking iron every other day instead of daily  Stop taking pantoprazole (heartburn medication)

## 2023-09-12 ENCOUNTER — TELEPHONE (OUTPATIENT)
Dept: INTERNAL MEDICINE CLINIC | Facility: CLINIC | Age: 58
End: 2023-09-12

## 2023-09-12 DIAGNOSIS — M62.838 MUSCLE SPASM: ICD-10-CM

## 2023-09-12 LAB
ATRIAL RATE: 73 BPM
P AXIS: 37 DEGREES
PR INTERVAL: 140 MS
QRS AXIS: 90 DEGREES
QRSD INTERVAL: 94 MS
QT INTERVAL: 424 MS
QTC INTERVAL: 467 MS
T WAVE AXIS: 17 DEGREES
VENTRICULAR RATE: 73 BPM

## 2023-09-12 PROCEDURE — 93010 ELECTROCARDIOGRAM REPORT: CPT | Performed by: INTERNAL MEDICINE

## 2023-09-12 RX ORDER — DULOXETIN HYDROCHLORIDE 30 MG/1
CAPSULE, DELAYED RELEASE ORAL
Qty: 180 CAPSULE | Refills: 1 | Status: ON HOLD | OUTPATIENT
Start: 2023-09-12

## 2023-09-12 NOTE — PROGRESS NOTES
SW met with pt as per her request.  Pt reviewed that she will stay with her family and keep the [employer] until she is able to move into the [facility]. SW has encouraged she f/u wit her OP MH . Support offered. Pt has also asked Provider for a back brace. SW reviewed same with  the Clinical Team who will let PC know. Patient does not have any further questions, concerns, or other needs at this time. Patient has my contact # and PCP office # if needed. Social Work to remain available to assist as indicated. Please re-consult Social Work if needed.

## 2023-09-12 NOTE — TELEPHONE ENCOUNTER
Please place order for back brace and have the attending sign so we can fax to [facility]. Also patient is requesting a order for PT for her back.

## 2023-09-12 NOTE — TELEPHONE ENCOUNTER
Patient called in to request an order for a back brace. Patient said she was using a back brace previously but it was misplaced at another facility. Patient said the brace will help her with her hunchback.

## 2023-09-13 ENCOUNTER — OFFICE VISIT (OUTPATIENT)
Dept: PHYSICAL THERAPY | Facility: CLINIC | Age: 58
End: 2023-09-13
Payer: MEDICARE

## 2023-09-13 ENCOUNTER — PATIENT OUTREACH (OUTPATIENT)
Dept: INTERNAL MEDICINE CLINIC | Facility: CLINIC | Age: 58
End: 2023-09-13

## 2023-09-13 DIAGNOSIS — Z96.653 TOTAL KNEE REPLACEMENT STATUS, BILATERAL: Primary | ICD-10-CM

## 2023-09-13 DIAGNOSIS — G89.4 CHRONIC PAIN DISORDER: Primary | ICD-10-CM

## 2023-09-13 DIAGNOSIS — Z96.653 STATUS POST BILATERAL KNEE REPLACEMENTS: ICD-10-CM

## 2023-09-13 PROCEDURE — 97112 NEUROMUSCULAR REEDUCATION: CPT

## 2023-09-13 PROCEDURE — 97110 THERAPEUTIC EXERCISES: CPT

## 2023-09-13 PROCEDURE — 97535 SELF CARE MNGMENT TRAINING: CPT

## 2023-09-13 NOTE — PROGRESS NOTES
SW returned call to pt who is asking for assistance with transportation as she only has one lyft ride available with her insurance. Pt shares she owes [employer] past tickets that she disputes so they are not willing to transport her until the past bill is paid. SW did review that paying this past bill will probably be less expensive then paying for Lyft rides. Sw did also ask about if family can transport pt but their car is not working at present. SW was going to suggest she can check with the Offices to see if they can help but pt disconnected the call. SW to f/u with pt re same.

## 2023-09-13 NOTE — TELEPHONE ENCOUNTER
Order for Back brace faxed to [address]. Placed in scanning folder to be scanned to chart.

## 2023-09-13 NOTE — PROGRESS NOTES
Daily Note     Today's date: 2023  Patient name: Sage Al  : 1965  MRN: [id number]  Referring provider: London Johnson,*  Dx: No diagnosis found. Subjective:  Pt presents to PT reporting no B knee pain today. Pt denies increased pain post PT session. Objective: See treatment diary below      Assessment: Pt requests walker handles to be adjusted to a higher position however, her elbows are already at 45 degrees when using walker and she appears to weight bear through B UE's. Pt was educated on strengthening back muscles with proper posture. Pt was advised to sit when she feels tired and cannot maintain proper standing posture. Pt's walker has a seat she can utilize. Pt was not happy but repeated reasons for not increasing height of handles. Added hip flexor stretch secondary to tight B hip flexors. Discussed with pt about possibly getting script for back. Patient demonstrated fatigue post treatment, exhibited good technique with therapeutic exercises and would benefit from continued PT to increase flexibility, strength and function. Plan: Continue per plan of care.       Precautions: s/p TKA (R TKA 3/9/22, L TKA 22)    Date    Visit # 21 22      18 19 20   FOTO             Re-eval               Manuals    B/l knee PROM        PWK     Hip flexor str        PWK     Quad STM        PWK                  Neuro Re-Ed    Quad sets 20x3" ea sup        20x3" ea sup 20x3" ea sup   Seated lumbar ext  3" x 15           LAQ 3x10 2# 3" hold 3# 3" x 15 ea       3x10 2# 3" hold 3x10 2# 3" hold   SAQ 3x10 2# 3" hold 3# 3" x 15 ea       3x10 2# 3" hold 3x10 2# 3" hold   Hip flexor stretch  30' x3 manual  B           bridges 20x2" 20x2"       20x2" 20x2"   Weight shifts             SLS             Ther Ex    bike 6' 6'      6' 6' 6'   Leg press 3x10 125# 3x10 125#      3x10 125# 3x10 125# 3x10 125#   SL leg press 2x10 75# 2x10 75#      2x10 75# 2x10 75# 2x10 75#   Seated march 2x10 standing  2x10 standing           Glut sets             Hamstring curl stand 2x10 2# 2 x 10           Standing hip abd/ext 2x15 ea 2# 2x15 ea 2#      2x15 ea 2# 2x15 ea 2# 2x15 ea 2#   Hamstring stretch             Mini squats 2x10            SLR 2x10 2#        2x10 2#  2x10 2#  2x10 2#    Side steps PTB 3 laps            Ther Activity 9/11 9/13 8/28 8/31 9/7   Sit to stands             Pt edu             Step ups             Gait Training 9/11 9/13 8/28 8/31 9/7   Walking with walker 2 laps RW 2 laps RW        2 laps RW c higher handle, 1 lap c    Walking with cane             Obstacle course with walker                          Modalities 9/11 9/13

## 2023-09-14 ENCOUNTER — PATIENT OUTREACH (OUTPATIENT)
Dept: INTERNAL MEDICINE CLINIC | Facility: CLINIC | Age: 58
End: 2023-09-14

## 2023-09-14 ENCOUNTER — HOSPITAL ENCOUNTER (INPATIENT)
Facility: HOSPITAL | Age: 58
LOS: 11 days | Discharge: HOME | DRG: 880 | End: 2023-09-25
Attending: EMERGENCY MEDICINE | Admitting: STUDENT IN AN ORGANIZED HEALTH CARE EDUCATION/TRAINING PROGRAM
Payer: MEDICARE

## 2023-09-14 DIAGNOSIS — G47.09 OTHER INSOMNIA: ICD-10-CM

## 2023-09-14 DIAGNOSIS — M79.7 FIBROMYALGIA: ICD-10-CM

## 2023-09-14 DIAGNOSIS — K21.9 GERD WITHOUT ESOPHAGITIS: ICD-10-CM

## 2023-09-14 DIAGNOSIS — Z00.8 MEDICAL CLEARANCE FOR PSYCHIATRIC ADMISSION: ICD-10-CM

## 2023-09-14 DIAGNOSIS — F31.30 BIPOLAR I DISORDER, MOST RECENT EPISODE DEPRESSED (HCC): ICD-10-CM

## 2023-09-14 DIAGNOSIS — F41.9 ANXIETY: Primary | ICD-10-CM

## 2023-09-14 DIAGNOSIS — F41.0 PANIC DISORDER WITHOUT AGORAPHOBIA: ICD-10-CM

## 2023-09-14 DIAGNOSIS — F41.1 GENERALIZED ANXIETY DISORDER: ICD-10-CM

## 2023-09-14 DIAGNOSIS — I10 HYPERTENSION, UNSPECIFIED TYPE: ICD-10-CM

## 2023-09-14 LAB
ALBUMIN SERPL BCP-MCNC: 4.4 G/DL (ref 3.5–5)
ALP SERPL-CCNC: 83 U/L (ref 34–104)
ALT SERPL W P-5'-P-CCNC: 16 U/L (ref 7–52)
AMPHETAMINES SERPL QL SCN: NEGATIVE
ANION GAP SERPL CALCULATED.3IONS-SCNC: 9 MMOL/L
AST SERPL W P-5'-P-CCNC: 21 U/L (ref 13–39)
BACTERIA UR QL AUTO: NORMAL /HPF
BARBITURATES UR QL: NEGATIVE
BASOPHILS # BLD AUTO: 0.04 THOUSANDS/ÂΜL (ref 0–0.1)
BASOPHILS NFR BLD AUTO: 1 % (ref 0–1)
BENZODIAZ UR QL: NEGATIVE
BILIRUB SERPL-MCNC: 1.03 MG/DL (ref 0.2–1)
BILIRUB UR QL STRIP: NEGATIVE
BUN SERPL-MCNC: 11 MG/DL (ref 5–25)
CALCIUM SERPL-MCNC: 8.9 MG/DL (ref 8.4–10.2)
CHLORIDE SERPL-SCNC: 105 MMOL/L (ref 96–108)
CLARITY UR: CLEAR
CO2 SERPL-SCNC: 25 MMOL/L (ref 21–32)
COCAINE UR QL: NEGATIVE
COLOR UR: ABNORMAL
CREAT SERPL-MCNC: 0.72 MG/DL (ref 0.6–1.3)
EOSINOPHIL # BLD AUTO: 0.22 THOUSAND/ÂΜL (ref 0–0.61)
EOSINOPHIL NFR BLD AUTO: 6 % (ref 0–6)
ERYTHROCYTE [DISTWIDTH] IN BLOOD BY AUTOMATED COUNT: 13.6 % (ref 11.6–15.1)
ETHANOL SERPL-MCNC: <10 MG/DL
GFR SERPL CREATININE-BSD FRML MDRD: 91 ML/MIN/1.73SQ M
GLUCOSE SERPL-MCNC: 98 MG/DL (ref 65–140)
GLUCOSE UR STRIP-MCNC: NEGATIVE MG/DL
HCT VFR BLD AUTO: 40.7 % (ref 34.8–46.1)
HGB BLD-MCNC: 13.7 G/DL (ref 11.5–15.4)
HGB UR QL STRIP.AUTO: 10
IMM GRANULOCYTES # BLD AUTO: 0.01 THOUSAND/UL (ref 0–0.2)
IMM GRANULOCYTES NFR BLD AUTO: 0 % (ref 0–2)
KETONES UR STRIP-MCNC: NEGATIVE MG/DL
LEUKOCYTE ESTERASE UR QL STRIP: 100
LYMPHOCYTES # BLD AUTO: 1.68 THOUSANDS/ÂΜL (ref 0.6–4.47)
LYMPHOCYTES NFR BLD AUTO: 48 % (ref 14–44)
MCH RBC QN AUTO: 30.1 PG (ref 26.8–34.3)
MCHC RBC AUTO-ENTMCNC: 33.7 G/DL (ref 31.4–37.4)
MCV RBC AUTO: 90 FL (ref 82–98)
METHADONE UR QL: NEGATIVE
MONOCYTES # BLD AUTO: 0.24 THOUSAND/ÂΜL (ref 0.17–1.22)
MONOCYTES NFR BLD AUTO: 7 % (ref 4–12)
NEUTROPHILS # BLD AUTO: 1.32 THOUSANDS/ÂΜL (ref 1.85–7.62)
NEUTS SEG NFR BLD AUTO: 38 % (ref 43–75)
NITRITE UR QL STRIP: NEGATIVE
NON-SQ EPI CELLS URNS QL MICRO: NORMAL /HPF
NRBC BLD AUTO-RTO: 0 /100 WBCS
OPIATES UR QL SCN: NEGATIVE
OXYCODONE+OXYMORPHONE UR QL SCN: NEGATIVE
PCP UR QL: NEGATIVE
PH UR STRIP.AUTO: 8 [PH]
PLATELET # BLD AUTO: 254 THOUSANDS/UL (ref 149–390)
PMV BLD AUTO: 9.1 FL (ref 8.9–12.7)
POTASSIUM SERPL-SCNC: 3.5 MMOL/L (ref 3.5–5.3)
PROT SERPL-MCNC: 6.8 G/DL (ref 6.4–8.4)
PROT UR STRIP-MCNC: NEGATIVE MG/DL
RBC # BLD AUTO: 4.55 MILLION/UL (ref 3.81–5.12)
RBC #/AREA URNS AUTO: NORMAL /HPF
SODIUM SERPL-SCNC: 139 MMOL/L (ref 135–147)
SP GR UR STRIP.AUTO: 1.01 (ref 1–1.04)
THC UR QL: NEGATIVE
TSH SERPL DL<=0.05 MIU/L-ACNC: 4.7 UIU/ML (ref 0.45–4.5)
UROBILINOGEN UA: NEGATIVE MG/DL
WBC # BLD AUTO: 3.51 THOUSAND/UL (ref 4.31–10.16)
WBC #/AREA URNS AUTO: NORMAL /HPF

## 2023-09-14 PROCEDURE — 81001 URINALYSIS AUTO W/SCOPE: CPT | Performed by: EMERGENCY MEDICINE

## 2023-09-14 PROCEDURE — 82077 ASSAY SPEC XCP UR&BREATH IA: CPT | Performed by: EMERGENCY MEDICINE

## 2023-09-14 PROCEDURE — 80307 DRUG TEST PRSMV CHEM ANLYZR: CPT | Performed by: EMERGENCY MEDICINE

## 2023-09-14 PROCEDURE — 85025 COMPLETE CBC W/AUTO DIFF WBC: CPT | Performed by: EMERGENCY MEDICINE

## 2023-09-14 PROCEDURE — 80053 COMPREHEN METABOLIC PANEL: CPT | Performed by: EMERGENCY MEDICINE

## 2023-09-14 PROCEDURE — 36415 COLL VENOUS BLD VENIPUNCTURE: CPT | Performed by: EMERGENCY MEDICINE

## 2023-09-14 PROCEDURE — 82075 ASSAY OF BREATH ETHANOL: CPT | Performed by: EMERGENCY MEDICINE

## 2023-09-14 PROCEDURE — 99284 EMERGENCY DEPT VISIT MOD MDM: CPT

## 2023-09-14 PROCEDURE — 84443 ASSAY THYROID STIM HORMONE: CPT | Performed by: EMERGENCY MEDICINE

## 2023-09-14 PROCEDURE — 93005 ELECTROCARDIOGRAM TRACING: CPT

## 2023-09-14 PROCEDURE — 99285 EMERGENCY DEPT VISIT HI MDM: CPT | Performed by: EMERGENCY MEDICINE

## 2023-09-14 PROCEDURE — 81003 URINALYSIS AUTO W/O SCOPE: CPT | Performed by: EMERGENCY MEDICINE

## 2023-09-14 RX ORDER — HALOPERIDOL 5 MG/ML
5 INJECTION INTRAMUSCULAR
Status: DISCONTINUED | OUTPATIENT
Start: 2023-09-14 | End: 2023-09-25 | Stop reason: HOSPADM

## 2023-09-14 RX ORDER — IBUPROFEN 600 MG/1
600 TABLET ORAL EVERY 8 HOURS PRN
Status: DISCONTINUED | OUTPATIENT
Start: 2023-09-14 | End: 2023-09-25 | Stop reason: HOSPADM

## 2023-09-14 RX ORDER — RISPERIDONE 1 MG/1
1 TABLET ORAL
Status: DISCONTINUED | OUTPATIENT
Start: 2023-09-14 | End: 2023-09-25 | Stop reason: HOSPADM

## 2023-09-14 RX ORDER — ATORVASTATIN CALCIUM 40 MG/1
40 TABLET, FILM COATED ORAL DAILY
Status: DISCONTINUED | OUTPATIENT
Start: 2023-09-14 | End: 2023-09-14

## 2023-09-14 RX ORDER — LANOLIN ALCOHOL/MO/W.PET/CERES
6 CREAM (GRAM) TOPICAL
Status: DISCONTINUED | OUTPATIENT
Start: 2023-09-14 | End: 2023-09-14

## 2023-09-14 RX ORDER — METHOCARBAMOL 500 MG/1
500 TABLET, FILM COATED ORAL 2 TIMES DAILY PRN
Status: DISCONTINUED | OUTPATIENT
Start: 2023-09-14 | End: 2023-09-14

## 2023-09-14 RX ORDER — HYDROXYZINE HYDROCHLORIDE 25 MG/1
25 TABLET, FILM COATED ORAL
Status: DISCONTINUED | OUTPATIENT
Start: 2023-09-14 | End: 2023-09-25 | Stop reason: HOSPADM

## 2023-09-14 RX ORDER — AMOXICILLIN 250 MG
1 CAPSULE ORAL DAILY PRN
Status: DISCONTINUED | OUTPATIENT
Start: 2023-09-14 | End: 2023-09-25 | Stop reason: HOSPADM

## 2023-09-14 RX ORDER — POLYETHYLENE GLYCOL 3350 17 G/17G
17 POWDER, FOR SOLUTION ORAL DAILY PRN
Status: DISCONTINUED | OUTPATIENT
Start: 2023-09-14 | End: 2023-09-25 | Stop reason: HOSPADM

## 2023-09-14 RX ORDER — RISPERIDONE 0.25 MG/1
0.25 TABLET ORAL
Status: DISCONTINUED | OUTPATIENT
Start: 2023-09-14 | End: 2023-09-25 | Stop reason: HOSPADM

## 2023-09-14 RX ORDER — AMLODIPINE BESYLATE 10 MG/1
10 TABLET ORAL DAILY
Status: DISCONTINUED | OUTPATIENT
Start: 2023-09-14 | End: 2023-09-14

## 2023-09-14 RX ORDER — DULOXETIN HYDROCHLORIDE 30 MG/1
30 CAPSULE, DELAYED RELEASE ORAL 2 TIMES DAILY
Status: DISCONTINUED | OUTPATIENT
Start: 2023-09-14 | End: 2023-09-14

## 2023-09-14 RX ORDER — LORAZEPAM 0.5 MG/1
0.5 TABLET ORAL
Status: DISCONTINUED | OUTPATIENT
Start: 2023-09-14 | End: 2023-09-25 | Stop reason: HOSPADM

## 2023-09-14 RX ORDER — IBUPROFEN 400 MG/1
400 TABLET ORAL EVERY 6 HOURS PRN
Status: DISCONTINUED | OUTPATIENT
Start: 2023-09-14 | End: 2023-09-25 | Stop reason: HOSPADM

## 2023-09-14 RX ORDER — TRAZODONE HYDROCHLORIDE 50 MG/1
50 TABLET ORAL
Status: DISCONTINUED | OUTPATIENT
Start: 2023-09-14 | End: 2023-09-25 | Stop reason: HOSPADM

## 2023-09-14 RX ORDER — IBUPROFEN 400 MG/1
200 TABLET ORAL EVERY 6 HOURS PRN
Status: DISCONTINUED | OUTPATIENT
Start: 2023-09-14 | End: 2023-09-25 | Stop reason: HOSPADM

## 2023-09-14 RX ORDER — LANOLIN ALCOHOL/MO/W.PET/CERES
6 CREAM (GRAM) TOPICAL ONCE
Status: COMPLETED | OUTPATIENT
Start: 2023-09-14 | End: 2023-09-14

## 2023-09-14 RX ORDER — QUETIAPINE FUMARATE 50 MG/1
50 TABLET, FILM COATED ORAL
Status: DISCONTINUED | OUTPATIENT
Start: 2023-09-14 | End: 2023-09-14

## 2023-09-14 RX ORDER — LORAZEPAM 1 MG/1
1 TABLET ORAL
Status: DISCONTINUED | OUTPATIENT
Start: 2023-09-14 | End: 2023-09-25 | Stop reason: HOSPADM

## 2023-09-14 RX ORDER — LORAZEPAM 2 MG/ML
1 INJECTION INTRAMUSCULAR
Status: DISCONTINUED | OUTPATIENT
Start: 2023-09-14 | End: 2023-09-25 | Stop reason: HOSPADM

## 2023-09-14 RX ORDER — RISPERIDONE 0.5 MG/1
0.5 TABLET ORAL
Status: DISCONTINUED | OUTPATIENT
Start: 2023-09-14 | End: 2023-09-25 | Stop reason: HOSPADM

## 2023-09-14 RX ORDER — QUETIAPINE 150 MG/1
150 TABLET, FILM COATED, EXTENDED RELEASE ORAL
Status: DISCONTINUED | OUTPATIENT
Start: 2023-09-14 | End: 2023-09-25 | Stop reason: HOSPADM

## 2023-09-14 RX ORDER — PREGABALIN 50 MG/1
50 CAPSULE ORAL DAILY
Status: DISCONTINUED | OUTPATIENT
Start: 2023-09-14 | End: 2023-09-15

## 2023-09-14 RX ORDER — CLONAZEPAM 0.5 MG/1
0.5 TABLET ORAL 2 TIMES DAILY
Status: DISCONTINUED | OUTPATIENT
Start: 2023-09-14 | End: 2023-09-15

## 2023-09-14 RX ADMIN — IBUPROFEN 400 MG: 400 TABLET ORAL at 23:18

## 2023-09-14 RX ADMIN — Medication 6 MG: at 03:37

## 2023-09-14 RX ADMIN — LORAZEPAM 1 MG: 1 TABLET ORAL at 15:55

## 2023-09-14 RX ADMIN — ASPIRIN 81 MG: 81 TABLET, COATED ORAL at 12:40

## 2023-09-14 RX ADMIN — VALBENAZINE 40 MG: 40 CAPSULE ORAL at 14:25

## 2023-09-14 RX ADMIN — PREGABALIN 50 MG: 50 CAPSULE ORAL at 12:49

## 2023-09-14 RX ADMIN — ATORVASTATIN CALCIUM 40 MG: 20 TABLET, FILM COATED ORAL at 12:40

## 2023-09-14 RX ADMIN — AMLODIPINE BESYLATE 10 MG: 5 TABLET ORAL at 12:40

## 2023-09-14 RX ADMIN — DULOXETINE 30 MG: 30 CAPSULE, DELAYED RELEASE ORAL at 12:41

## 2023-09-14 RX ADMIN — CLONAZEPAM 0.5 MG: 0.5 TABLET ORAL at 17:14

## 2023-09-14 RX ADMIN — HYDROXYZINE HYDROCHLORIDE 25 MG: 25 TABLET ORAL at 21:26

## 2023-09-14 RX ADMIN — QUETIAPINE FUMARATE 50 MG: 50 TABLET ORAL at 12:41

## 2023-09-14 RX ADMIN — QUETIAPINE FUMARATE 150 MG: 50 TABLET, EXTENDED RELEASE ORAL at 21:26

## 2023-09-14 RX ADMIN — IBUPROFEN 600 MG: 600 TABLET ORAL at 17:14

## 2023-09-14 NOTE — ED NOTES
Pt out of room - ambulated to bathroom with walker.  Pt back to bed sleeping     Marquis Medellin RN  09/14/23 [id number]

## 2023-09-14 NOTE — ED NOTES
[place] PR[place]e indicates: Active. Recipient #[id number]   [facility] managed by [employer] [place]. Primary payor is [facility] Medicare Assured. Plan is a Medicare replacement.

## 2023-09-14 NOTE — PLAN OF CARE
Problem: Potential for Falls  Goal: Patient will remain free of falls  Description: INTERVENTIONS:  - Educate patient/family on patient safety including physical limitations  - Instruct patient to call for assistance with activity   - Consult OT/PT to assist with strengthening/mobility   - Keep Call bell within reach  - Keep bed low and locked with side rails adjusted as appropriate  - Keep care items and personal belongings within reach  - Initiate and maintain comfort rounds  - Make Fall Risk Sign visible to staff  - Offer Toileting every  Hours, in advance of need  - Initiate/Maintain alarm  - Obtain necessary fall risk management equipment:  - Apply yellow socks and bracelet for high fall risk patients  - Consider moving patient to room near nurses station  Outcome: Not Progressing     Problem: Depression  Goal: Treatment Goal: Demonstrate behavioral control of depressive symptoms, verbalize feelings of improved mood/affect, and adopt new coping skills prior to discharge  Outcome: Not Progressing  Goal: Verbalize thoughts and feelings  Description: Interventions:  - Assess and re-assess patient's level of risk   - Engage patient in 1:1 interactions, daily, for a minimum of 15 minutes   - Encourage patient to express feelings, fears, frustrations, hopes   Outcome: Not Progressing  Goal: Refrain from harming self  Description: Interventions:  - Monitor patient closely, per order   - Supervise medication ingestion, monitor effects and side effects   Outcome: Not Progressing  Goal: Refrain from isolation  Description: Interventions:  - Develop a trusting relationship   - Encourage socialization   Outcome: Not Progressing  Goal: Refrain from self-neglect  Outcome: Not Progressing  Goal: Attend and participate in unit activities, including therapeutic, recreational, and educational groups  Description: Interventions:  - Provide therapeutic and educational activities daily, encourage attendance and participation, and document same in the medical record   Outcome: Not Progressing  Goal: Complete daily ADLs, including personal hygiene independently, as able  Description: Interventions:  - Observe, teach, and assist patient with ADLS  -  Monitor and promote a balance of rest/activity, with adequate nutrition and elimination   Outcome: Not Progressing     Problem: Anxiety  Goal: Anxiety is at manageable level  Description: Interventions:  - Assess and monitor patient's anxiety level. - Monitor for signs and symptoms (heart palpitations, chest pain, shortness of breath, headaches, nausea, feeling jumpy, restlessness, irritable, apprehensive). - Collaborate with interdisciplinary team and initiate plan and interventions as ordered. - Sophia patient to unit/surroundings  - Explain treatment plan  - Encourage participation in care  - Encourage verbalization of concerns/fears  - Identify coping mechanisms  - Assist in developing anxiety-reducing skills  - Administer/offer alternative therapies  - Limit or eliminate stimulants  Outcome: Not Progressing     Problem: BEHAVIOR  Goal: Pt/Family maintain appropriate behavior and adhere to behavioral management agreement, if implemented  Description: INTERVENTIONS:  - Assess the family dynamic   - Encourage verbalization of thoughts and concerns in a socially appropriate manner  - Assess patient/family's coping skills and non-compliant behavior (including use of illegal substances). - Utilize positive, consistent limit setting strategies supporting safety of patient, staff and others  - Initiate consult with Case Management, Spiritual Care or other ancillary services as appropriate  - If a patient's/visitor's behavior jeopardizes the safety of the patient, staff, or others, refer to organization procedure.    - Notify Security of behavior or suspected illegal substances which indicate the need for search of the patient and/or belongings  - Encourage participation in the decision making process about a behavioral management agreement; implement if patient meets criteria  Outcome: Not Progressing     Problem: SLEEP DISTURBANCE  Goal: Will exhibit normal sleeping pattern  Description: Interventions:  -  Assess the patients sleep pattern, noting recent changes  - Administer medication as ordered  - Decrease environmental stimuli, including noise, as appropriate during the night  - Encourage the patient to actively participate in unit groups and or exercise during the day to enhance ability to achieve adequate sleep at night  - Assess the patient, in the morning, encouraging a description of sleep experience  Outcome: Not Progressing

## 2023-09-14 NOTE — ED NOTES
Insurance Authorization Request for Admission:   Request faxed to [address] [place] [address]. Fax number: [phone]. Fax confirmation and original emailed to [email or web address].  Days approved: TBD. Level of care: [facility]. Review on TBD. Authorization # To be issued upon approval.        Insurance COB for admission: To be completed once primary payor provides number of days approved. Phone call to be placed to [facility]. Phone number: [phone]. Level of care: [facility].

## 2023-09-14 NOTE — ED PROVIDER NOTES
History  Chief Complaint   Patient presents with   • Panic Attack     Reports increased panic attacks at home - states she has been to emergency rooms every week and she hasn't been able to get help. She states she took her klonipin, lorazepam,seroquel and remeron 4 hours prior to coming in and then took an "old" trazaone to help her sleep and "nothing's working." Denies suicidal or homicidal ideation and no hallucinations. She states she feels a lot of stress in her life but feels safe where she lives. 49-year-old female, presents with anxiety. Patient states she is very anxious, has difficulty concentrating and sleeping. Patient states she has a lot of stress in her life involving her children which causes a lot of her anxiety. Has been taking multiple medications with no improvement, has had numerous ED visits with similar symptoms. Patient denies any drug or alcohol use. Denies any thoughts about harming herself or others. History provided by:  Patient   used: No    Panic Attack  Associated symptoms: anxiety        Prior to Admission Medications   Prescriptions Last Dose Informant Patient Reported? Taking?    Aspirin Low Dose 81 MG EC tablet   No No   Sig: TAKE 1 TABLET BY MOUTH EVERY DAY   DULoxetine (CYMBALTA) 30 mg delayed release capsule   No No   Sig: Take 2 capsules (60 mg) every morning and 1 capsule (30 mg) at night   Diclofenac Sodium (VOLTAREN) 1 %   No No   Sig: Apply 2 g topically 4 (four) times a day   Ingrezza 40 MG CAPS   No No   Sig: Take 40 mg by mouth in the morning   LORazepam (ATIVAN) 0.5 mg tablet   Yes No   LORazepam (ATIVAN) 1 mg tablet   No No   Sig: Take 0.5 tablets (0.5 mg total) by mouth every 6 (six) hours as needed for anxiety for up to 15 days   QUEtiapine (SEROquel) 50 mg tablet   No No   Sig: Take 1 tablet (50 mg total) by mouth 4 (four) times a day (with meals and at bedtime)   amLODIPine (NORVASC) 10 mg tablet   No No   Sig: TAKE 1 TABLET BY MOUTH EVERY DAY   atorvastatin (LIPITOR) 40 mg tablet   No No   Sig: TAKE 1 TABLET BY MOUTH EVERY DAY   clonazePAM (KlonoPIN) 0.5 mg tablet   No No   Sig: Take 1 tablet (0.5 mg total) by mouth 2 (two) times a day   ferrous sulfate 324 (65 Fe) mg   No No   Sig: Take 1 tablet (324 mg total) by mouth every other day   methocarbamol (ROBAXIN) 500 mg tablet   No No   Sig: Take 1 tablet (500 mg total) by mouth 2 (two) times a day as needed for muscle spasms   mirtazapine (REMERON) 15 mg tablet   No No   Sig: Take 1 tablet (15 mg total) by mouth daily at bedtime   pregabalin (LYRICA) 50 mg capsule   No No   Si capsule twice a day. May increase to 1 capsule 3 times per day if needed. Facility-Administered Medications: None       Past Medical History:   Diagnosis Date   • Anxiety    • Arthritis     left knee   • Arthritis of right knee 10/6/2020   • At risk for falls    • Bipolar 2 disorder (HCC)     FOLLOWS WITH PSYCHIATRIST. CONTINUE LAMOTRIGINE; RESOLVED: [id number]   • Depression    • Familial tremor     both hands   • Fibromyalgia     LAST ASSESSED: [id number]   • GERD (gastroesophageal reflux disease)    • Hearing aid worn     left ear   • [place] (hard of hearing)     left ear   • Hyperlipidemia    • Hypertension    • Left-sided weakness    • Lumbar disc disease with radiculopathy 2018   • Memory loss of unknown cause     long and short term   • Migraine    • Multiple closed fractures of metatarsal bone of right foot 2021   • Obesity    • Obesity, Class II, BMI 35-39.9    • Osteoarthritis of both hips 10/31/2016   • Osteoarthritis of knee 2013    Description: Continue Tylenol and Naproxen. Encourage exercise and weight loss. Patient refused physical therapy. I will refer the patient back to Orthopedics.    • Overactive bladder    • Panic attack    • Patellofemoral disorder of both knees 2020   • Post traumatic stress disorder    • Primary localized osteoarthritis of both knees 2017   • Primary osteoarthritis of both knees 5/1/2020   • S/P insertion of spinal cord stimulator     no remote   • S/P total knee arthroplasty, right 3/10/2022   • Sacroiliitis ([address]) 2/28/2017   • Seasonal allergies    • Seizure-like activity ([address]) 6/3/2022   • Seizures ([address])     possible seizure like activity   • Small bowel obstruction ([address]) 3/24/2023   • Status post total knee replacement, left 7/5/2022   • Stroke [facility])     questionable stroke 2009   • Tardive dyskinesia     PATIENT STATES   • 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   CONT ASA 81 MG DAILY; RESOLVED: [id number]   • Urinary incontinence    • Uses walker    • Wears dentures     partial lower / full upper- doesnt wear   • Wears glasses        Past Surgical History:   Procedure Laterality Date   • BACK SURGERY     • [address] [facility]   • COLONOSCOPY      RESOLVED: [id number]   • EAR SURGERY     • EGD     • HYSTERECTOMY  2004   • MYRINGOTOMY W/ TUBES Left    • NECK SURGERY  04/2019   • [facility] ARTHRP KNE CONDYLE&PLATU MEDIAL&LAT COMPARTMENTS Right 3/9/2022    Procedure: ARTHROPLASTY KNEE TOTAL;  Surgeon: Alber Lyman DO;  Location: [facility];  Service: Orthopedics   • VA ARTHRP KNE CONDYLE&PLATU MEDIAL&LAT COMPARTMENTS Left 7/5/2022    Procedure: ARTHROPLASTY KNEE TOTAL;  Surgeon: Alber Lyman DO;  [facility];  Service: Orthopedics   • VA CYSTOURETHROSCOPY N/A 2/18/2016    Procedure: CYSTOSCOPY, BOTOX INJECTION;  Surgeon: Khadijah Lee MD;  Location: [facility];  Service: Gynecology   • VA INSJ/RPLCMT SPI NPGR DIR/INDUXIVE COUPLING Right 2/10/2021    Procedure: REPLACEMENT IMPLANTABLE PULSE GENERATOR DORSAL SPINAL COLUMN STIMULATOR, RIGHT;  Surgeon: Tc Verdin MD;  Location: [facility];  Service: Neurosurgery   • VA PRQ [address] NSTIM ELECTRODE ARRAY EPIDURAL Right 7/28/2020    Procedure: INSERTION THORACIC DORSAL COLUMN SPINAL CORD STIMULATOR PERCUTANEOUS W IMPLANTABLE PULSE GENERATOR, RIGHT;  Surgeon: Tony Flores Mel Howell MD;  Location:  MAIN OR;  Service: Neurosurgery   • TONSILLECTOMY     • TUBAL LIGATION  1986   • UPPER GASTROINTESTINAL ENDOSCOPY  09/2020       Family History   Problem Relation Age of Onset   • Colon cancer Mother    • Alzheimer's disease Father    • Stroke Father    • Colon cancer Brother    • Bipolar disorder Brother    • Breast cancer Maternal Aunt    • Colon cancer Maternal Aunt    • Heart disease Other    • Diabetes Other    • Hypertension Other    • Seizures Son    • Depression Paternal Grandfather    • No Known Problems Sister    • No Known Problems Brother    • Thyroid disease Neg Hx      I have reviewed and agree with the history as documented. E-Cigarette/Vaping   • E-Cigarette Use Never User      E-Cigarette/Vaping Substances   • Nicotine No    • THC No    • CBD No    • Flavoring No    • Other No    • Unknown No      Social History     Tobacco Use   • Smoking status: Never   • Smokeless tobacco: Never   Vaping Use   • Vaping Use: Never used   Substance Use Topics   • Alcohol use: Not Currently     Comment: 2 x year; being a social drinker as per all scripts    • Drug use: No       Review of Systems   Constitutional: Negative. Respiratory: Negative. Cardiovascular: Negative. Gastrointestinal: Negative. Neurological: Negative. Psychiatric/Behavioral: Positive for sleep disturbance. The patient is nervous/anxious. Physical Exam  Physical Exam  Vitals and nursing note reviewed. Constitutional:       General: She is not in acute distress. HENT:      Head: Normocephalic and atraumatic. Eyes:      Extraocular Movements: Extraocular movements intact. Pupils: Pupils are equal, round, and reactive to light. Cardiovascular:      Rate and Rhythm: Normal rate and regular rhythm. Pulmonary:      Effort: Pulmonary effort is normal.      Breath sounds: Normal breath sounds. Musculoskeletal:         General: Normal range of motion.    Skin:     General: Skin is warm and dry.   Neurological:      General: No focal deficit present. Mental Status: She is alert and oriented to person, place, and time.    Psychiatric:      Comments: Anxious, cooperative         Vital Signs  ED Triage Vitals   Temperature Pulse Respirations Blood Pressure SpO2   09/14/23 0055 09/14/23 0055 09/14/23 0055 09/14/23 0103 09/14/23 0055   97.5 °F (36.4 °C) 100 18 (!) 151/114 100 %      Temp Source Heart Rate Source Patient Position - Orthostatic VS BP Location FiO2 (%)   09/14/23 0055 09/14/23 0055 09/14/23 0103 09/14/23 0103 --   Tympanic Monitor Sitting Left arm       Pain Score       09/14/23 0200       No Pain           Vitals:    09/14/23 0055 09/14/23 0103   BP:  (!) 151/114   Pulse: 100    Patient Position - Orthostatic VS:  Sitting         Visual Acuity      ED Medications  Medications - No data to display    Diagnostic Studies  Results Reviewed     Procedure Component Value Units Date/Time    TSH [[id number]]  (Abnormal) Collected: 09/14/23 0117    Lab Status: Final result Specimen: Blood from Hand, Right Updated: 09/14/23 0205     TSH 3RD GENERATON 4.699 uIU/mL     Ethanol [[id number]]  (Normal) Collected: 09/14/23 0117    Lab Status: Final result Specimen: Blood from Arm, Right Updated: 09/14/23 0148     Ethanol Lvl <10 mg/dL     Comprehensive metabolic panel [[id number]]  (Abnormal) Collected: 09/14/23 0117    Lab Status: Final result Specimen: Blood from Hand, Right Updated: 09/14/23 0148     Sodium 139 mmol/L      Potassium 3.5 mmol/L      Chloride 105 mmol/L      CO2 25 mmol/L      ANION GAP 9 mmol/L      BUN 11 mg/dL      Creatinine 0.72 mg/dL      Glucose 98 mg/dL      Calcium 8.9 mg/dL      AST 21 U/L      ALT 16 U/L      Alkaline Phosphatase 83 U/L      Total Protein 6.8 g/dL      Albumin 4.4 g/dL      Total Bilirubin 1.03 mg/dL      eGFR 91 ml/min/1.73sq m     Narrative:      [facility] guidelines for Chronic Kidney Disease (CKD):   •  Stage 1 with normal or high GFR (GFR > 90 mL/min/1.73 square meters)  •  Stage 2 Mild CKD (GFR = 60-89 mL/min/1.73 square meters)  •  Stage 3A Moderate CKD (GFR = 45-59 mL/min/1.73 square meters)  •  Stage 3B Moderate CKD (GFR = 30-44 mL/min/1.73 square meters)  •  Stage 4 Severe CKD (GFR = 15-29 mL/min/1.73 square meters)  •  Stage 5 End Stage CKD (GFR <15 mL/min/1.73 square meters)  Note: GFR calculation is accurate only with a steady state creatinine    Urine Microscopic [[id number]]  (Normal) Collected: 09/14/23 0121    Lab Status: Final result Specimen: Urine, Clean Catch Updated: 09/14/23 0143     RBC, UA None Seen /hpf      WBC, UA 2-4 /hpf      Epithelial Cells Occasional /hpf      Bacteria, UA Occasional /hpf     Rapid drug screen, urine [[id number]]  (Normal) Collected: 09/14/23 0121    Lab Status: Final result Specimen: Urine, Clean Catch Updated: 09/14/23 0141     Amph/Meth UR Negative     Barbiturate Ur Negative     Benzodiazepine Urine Negative     Cocaine Urine Negative     Methadone Urine Negative     Opiate Urine Negative     PCP Ur Negative     THC Urine Negative     Oxycodone Urine Negative    Narrative:      FOR MEDICAL PURPOSES ONLY. IF CONFIRMATION NEEDED PLEASE CONTACT THE LAB WITHIN 5 DAYS.     Drug Screen Cutoff Levels:  AMPHETAMINE/METHAMPHETAMINES  1000 ng/mL  BARBITURATES     200 ng/mL  BENZODIAZEPINES     200 ng/mL  COCAINE      300 ng/mL  METHADONE      300 ng/mL  OPIATES      300 ng/mL  PHENCYCLIDINE     25 ng/mL  THC       50 ng/mL  OXYCODONE      100 ng/mL    UA w Reflex to Microscopic w Reflex to Culture [[id number]]  (Abnormal) Collected: 09/14/23 0121    Lab Status: Final result Specimen: Urine, Clean Catch Updated: 09/14/23 0129     Color, UA Straw     Clarity, UA Clear     Specific Gravity, UA 1.010     pH, UA 8.0     Leukocytes, .0     Nitrite, UA Negative     Protein, UA Negative mg/dl      Glucose, UA Negative mg/dl      Ketones, UA Negative mg/dl      Bilirubin, UA Negative     Occult Blood, UA 10.0     UROBILINOGEN UA Negative mg/dL     CBC and differential [[id number]]  (Abnormal) Collected: 09/14/23 0117    Lab Status: Final result Specimen: Blood from Hand, Right Updated: 09/14/23 0128     WBC 3.51 Thousand/uL      RBC 4.55 Million/uL      Hemoglobin 13.7 g/dL      Hematocrit 40.7 %      MCV 90 fL      MCH 30.1 pg      MCHC 33.7 g/dL      RDW 13.6 %      MPV 9.1 fL      Platelets 208 Thousands/uL      nRBC 0 /100 WBCs      Neutrophils Relative 38 %      Immat GRANS % 0 %      Lymphocytes Relative 48 %      Monocytes Relative 7 %      Eosinophils Relative 6 %      Basophils Relative 1 %      Neutrophils Absolute 1.32 Thousands/µL      Immature Grans Absolute 0.01 Thousand/uL      Lymphocytes Absolute 1.68 Thousands/µL      Monocytes Absolute 0.24 Thousand/µL      Eosinophils Absolute 0.22 Thousand/µL      Basophils Absolute 0.04 Thousands/µL     POCT alcohol breath test [[id number]]  (Normal) Resulted: 09/14/23 0126    Lab Status: Final result Updated: 09/14/23 0127     EXTBreath Alcohol --                 No orders to display              Procedures  ECG 12 Lead Documentation Only    Date/Time: 9/14/2023 1:23 AM    Performed by: Aurora Chaudhary MD  Authorized by: Aurora Chaudhary MD    ECG reviewed by me, the ED Provider: yes    Patient location:  ED  Rate:     ECG rate assessment: normal    Rhythm:     Rhythm: sinus rhythm    Ectopy:     Ectopy: none    QRS:     QRS axis:  Normal    QRS intervals:  Normal  Conduction:     Conduction: normal    ST segments:     ST segments:  Normal  Comments:      Sinus rhythm at 67, no acute changes             ED Course                               SBIRT 20yo+    Flowsheet Row Most Recent Value   Initial Alcohol Screen: US AUDIT-C     1. How often do you have a drink containing alcohol? 0 Filed at: 09/14/2023 0102   2. How many drinks containing alcohol do you have on a typical day you are drinking? 0 Filed at: 09/14/2023 0102   3a. Male UNDER 65:  How often do you have five or more drinks on one occasion? 0 Filed at: 09/14/2023 0102   3b. FEMALE Any Age, or MALE 65+: How often do you have 4 or more drinks on one occassion? 0 Filed at: 09/14/2023 0102   Audit-C Score 0 Filed at: 09/14/2023 0102   CAMRON: How many times in the past year have you. .. Used an illegal drug or used a prescription medication for non-medical reasons? Never Filed at: 09/14/2023 0102                    Medical Decision Making  53-year-old female, presents with anxiety. Differential diagnosis includes anxiety, psychosis, intoxication among other diagnoses. Screening labs ordered, will consult ED crisis worker, continue to monitor in ED and reevaluate. Patient has signed 12 for voluntary psychiatric admission. Patient is medically cleared for psychiatric transfer, treatment. Amount and/or Complexity of Data Reviewed  External Data Reviewed: notes. Details: Previous medical records reviewed, multiple ED visits for anxiety. Labs: ordered. Decision-making details documented in ED Course. ECG/medicine tests: ordered and independent interpretation performed. Decision-making details documented in ED Course. Risk  Decision regarding hospitalization. Disposition  Final diagnoses:   Anxiety     Time reflects when diagnosis was documented in both MDM as applicable and the Disposition within this note     Time User Action Codes Description Comment    9/14/2023  1:56 AM Consuelo Tompkins Add [F41.9] Anxiety       ED Disposition     ED Disposition   Transfer to Behavioral Health    [facility]   --    Date/Time   Thu Sep 14, 2023  1:56 AM    Comment   Samantha Szymanski has been medically cleared. Follow-up Information    None         Patient's Medications   Discharge Prescriptions    No medications on file       No discharge procedures on file.     PDMP Review       Value Time User    PDMP Reviewed  Yes 7/19/2023 12:22 AM Adonis Lee PA-C          ED Provider  Electronically Signed by           Anju Dimas MD  09/14/23 2229

## 2023-09-14 NOTE — ED NOTES
Patient is accepted at [address]. Patient is accepted by Alia Condon MD.     Patient may go to the floor at D. Nurse report is to be called at 1430 to [phone] prior to patient transfer.

## 2023-09-14 NOTE — ED NOTES
Pt presented to the ED with [employer] from home at her request due to panic attacks. Pt presents as anxious, but cooperative in the ED. Pt was medically cleared and evaluated by crisis worker. Pt reports that 1 month ago her psychiatrist started her on Klonopin, but it has not been helping her anxiety. She feels constant anxiety and has multiple panic attacks per day. She has been evaluated for panic attacks in the ED several times recently. She is unable to identify a stressor to her anxiety. She has difficulty sleeping although she is on several medications. She has increased appetite with a reported 10 lb weight gain over the last month. Pt is scheduled for a psychaitry appointment next Tuesday, but she did not feel she could make it to that appointment with the severity of her symptoms. Pt uses a walker and at times needs assistance with ADLs and does not feel PHP would benefit her at this time. She was last admitted in 2021 for similar symptoms. She denies SI/HI/AH/VH. She reports many years ago she had some epsidoes of SIB by cutting, but denies any recent gestures. Pt denies prior suicide attempts. Pt is requesting inpatient admission at this time and understands her rights.

## 2023-09-14 NOTE — ED NOTES
Breakfast given to pt, Pt states she is tired and just got to sleep at 6am. Informed pt that I will leave her breakfast there so when she gets hungry she can eat     Kimmy Griffith RN  09/14/23 [address] [place] [address] Bridgette Pineda, [address] [place] [address]  09/14/23 0931

## 2023-09-14 NOTE — PROGRESS NOTES
Outpatient Care Management Note:    Received ADT alert that patient was at [address] [facility] on 9/11/23 for anxiety and per chart review was out of her clonazepam. Patient to follow up with outpatient mental health provider. Received ADT alert that patient is currently admitted to [facility] from 9/14/23 to present for anxiety and signed 201 for voluntary psychiatric admission.      Chart reviewed

## 2023-09-15 PROBLEM — M62.838 MUSCLE SPASM: Status: ACTIVE | Noted: 2023-09-15

## 2023-09-15 PROBLEM — F31.30 BIPOLAR I DISORDER, MOST RECENT EPISODE DEPRESSED (HCC): Status: ACTIVE | Noted: 2019-09-26

## 2023-09-15 LAB
25(OH)D3 SERPL-MCNC: 12 NG/ML (ref 30–100)
ALBUMIN SERPL BCP-MCNC: 4.1 G/DL (ref 3.5–5)
ALP SERPL-CCNC: 78 U/L (ref 34–104)
ALT SERPL W P-5'-P-CCNC: 13 U/L (ref 7–52)
ANION GAP SERPL CALCULATED.3IONS-SCNC: 8 MMOL/L
AST SERPL W P-5'-P-CCNC: 18 U/L (ref 13–39)
ATRIAL RATE: 67 BPM
BASOPHILS # BLD AUTO: 0.03 THOUSANDS/ÂΜL (ref 0–0.1)
BASOPHILS NFR BLD AUTO: 1 % (ref 0–1)
BILIRUB SERPL-MCNC: 0.92 MG/DL (ref 0.2–1)
BUN SERPL-MCNC: 16 MG/DL (ref 5–25)
CALCIUM SERPL-MCNC: 8.6 MG/DL (ref 8.4–10.2)
CHLORIDE SERPL-SCNC: 105 MMOL/L (ref 96–108)
CHOLEST SERPL-MCNC: 139 MG/DL
CO2 SERPL-SCNC: 26 MMOL/L (ref 21–32)
CREAT SERPL-MCNC: 0.8 MG/DL (ref 0.6–1.3)
EOSINOPHIL # BLD AUTO: 0.33 THOUSAND/ÂΜL (ref 0–0.61)
EOSINOPHIL NFR BLD AUTO: 9 % (ref 0–6)
ERYTHROCYTE [DISTWIDTH] IN BLOOD BY AUTOMATED COUNT: 13.8 % (ref 11.6–15.1)
FOLATE SERPL-MCNC: 11 NG/ML
GFR SERPL CREATININE-BSD FRML MDRD: 80 ML/MIN/1.73SQ M
GLUCOSE P FAST SERPL-MCNC: 91 MG/DL (ref 65–99)
GLUCOSE SERPL-MCNC: 91 MG/DL (ref 65–140)
HCT VFR BLD AUTO: 41.2 % (ref 34.8–46.1)
HDLC SERPL-MCNC: 74 MG/DL
HGB BLD-MCNC: 13.5 G/DL (ref 11.5–15.4)
IMM GRANULOCYTES # BLD AUTO: 0.02 THOUSAND/UL (ref 0–0.2)
IMM GRANULOCYTES NFR BLD AUTO: 1 % (ref 0–2)
LDLC SERPL CALC-MCNC: 56 MG/DL (ref 0–100)
LYMPHOCYTES # BLD AUTO: 1.88 THOUSANDS/ÂΜL (ref 0.6–4.47)
LYMPHOCYTES NFR BLD AUTO: 47 % (ref 14–44)
MAGNESIUM SERPL-MCNC: 2.1 MG/DL (ref 1.9–2.7)
MCH RBC QN AUTO: 30.3 PG (ref 26.8–34.3)
MCHC RBC AUTO-ENTMCNC: 32.8 G/DL (ref 31.4–37.4)
MCV RBC AUTO: 92 FL (ref 82–98)
MONOCYTES # BLD AUTO: 0.31 THOUSAND/ÂΜL (ref 0.17–1.22)
MONOCYTES NFR BLD AUTO: 8 % (ref 4–12)
NEUTROPHILS # BLD AUTO: 1.31 THOUSANDS/ÂΜL (ref 1.85–7.62)
NEUTS SEG NFR BLD AUTO: 34 % (ref 43–75)
NONHDLC SERPL-MCNC: 65 MG/DL
NRBC BLD AUTO-RTO: 0 /100 WBCS
P AXIS: 64 DEGREES
PHOSPHATE SERPL-MCNC: 4.6 MG/DL (ref 2.7–4.5)
PLATELET # BLD AUTO: 268 THOUSANDS/UL (ref 149–390)
PMV BLD AUTO: 9.1 FL (ref 8.9–12.7)
POTASSIUM SERPL-SCNC: 3.8 MMOL/L (ref 3.5–5.3)
PR INTERVAL: 142 MS
PROT SERPL-MCNC: 6.4 G/DL (ref 6.4–8.4)
QRS AXIS: 74 DEGREES
QRSD INTERVAL: 100 MS
QT INTERVAL: 438 MS
QTC INTERVAL: 462 MS
RBC # BLD AUTO: 4.46 MILLION/UL (ref 3.81–5.12)
SODIUM SERPL-SCNC: 139 MMOL/L (ref 135–147)
T WAVE AXIS: 51 DEGREES
TRIGL SERPL-MCNC: 45 MG/DL
TSH SERPL DL<=0.05 MIU/L-ACNC: 1.19 UIU/ML (ref 0.45–4.5)
VENTRICULAR RATE: 67 BPM
VIT B12 SERPL-MCNC: 424 PG/ML (ref 180–914)
WBC # BLD AUTO: 3.88 THOUSAND/UL (ref 4.31–10.16)

## 2023-09-15 PROCEDURE — 99253 IP/OBS CNSLTJ NEW/EST LOW 45: CPT | Performed by: NURSE PRACTITIONER

## 2023-09-15 PROCEDURE — 84443 ASSAY THYROID STIM HORMONE: CPT

## 2023-09-15 PROCEDURE — 82306 VITAMIN D 25 HYDROXY: CPT

## 2023-09-15 PROCEDURE — 80061 LIPID PANEL: CPT

## 2023-09-15 PROCEDURE — 93010 ELECTROCARDIOGRAM REPORT: CPT | Performed by: INTERNAL MEDICINE

## 2023-09-15 PROCEDURE — 99223 1ST HOSP IP/OBS HIGH 75: CPT | Performed by: STUDENT IN AN ORGANIZED HEALTH CARE EDUCATION/TRAINING PROGRAM

## 2023-09-15 PROCEDURE — 85025 COMPLETE CBC W/AUTO DIFF WBC: CPT

## 2023-09-15 PROCEDURE — 82746 ASSAY OF FOLIC ACID SERUM: CPT

## 2023-09-15 PROCEDURE — 80053 COMPREHEN METABOLIC PANEL: CPT

## 2023-09-15 PROCEDURE — 84100 ASSAY OF PHOSPHORUS: CPT

## 2023-09-15 PROCEDURE — 82607 VITAMIN B-12: CPT

## 2023-09-15 PROCEDURE — 83735 ASSAY OF MAGNESIUM: CPT

## 2023-09-15 RX ORDER — ATORVASTATIN CALCIUM 40 MG/1
40 TABLET, FILM COATED ORAL
Status: DISCONTINUED | OUTPATIENT
Start: 2023-09-15 | End: 2023-09-25 | Stop reason: HOSPADM

## 2023-09-15 RX ORDER — ESCITALOPRAM OXALATE 5 MG/1
5 TABLET ORAL DAILY
Status: DISCONTINUED | OUTPATIENT
Start: 2023-09-15 | End: 2023-09-15

## 2023-09-15 RX ORDER — CLONAZEPAM 0.5 MG/1
0.5 TABLET ORAL DAILY
Status: DISCONTINUED | OUTPATIENT
Start: 2023-09-16 | End: 2023-09-25 | Stop reason: HOSPADM

## 2023-09-15 RX ORDER — PANTOPRAZOLE SODIUM 40 MG/1
40 TABLET, DELAYED RELEASE ORAL
Status: DISCONTINUED | OUTPATIENT
Start: 2023-09-15 | End: 2023-09-25 | Stop reason: HOSPADM

## 2023-09-15 RX ORDER — ERGOCALCIFEROL 1.25 MG/1
50000 CAPSULE ORAL WEEKLY
Status: DISCONTINUED | OUTPATIENT
Start: 2023-09-15 | End: 2023-09-25 | Stop reason: HOSPADM

## 2023-09-15 RX ORDER — ESCITALOPRAM OXALATE 10 MG/1
10 TABLET ORAL DAILY
Status: DISCONTINUED | OUTPATIENT
Start: 2023-09-16 | End: 2023-09-19

## 2023-09-15 RX ORDER — PREGABALIN 50 MG/1
50 CAPSULE ORAL 3 TIMES DAILY
Status: DISCONTINUED | OUTPATIENT
Start: 2023-09-15 | End: 2023-09-18

## 2023-09-15 RX ORDER — CLONAZEPAM 1 MG/1
1 TABLET ORAL
Status: DISCONTINUED | OUTPATIENT
Start: 2023-09-15 | End: 2023-09-25 | Stop reason: HOSPADM

## 2023-09-15 RX ORDER — FERROUS SULFATE 325(65) MG
325 TABLET ORAL EVERY OTHER DAY
Status: DISCONTINUED | OUTPATIENT
Start: 2023-09-15 | End: 2023-09-25 | Stop reason: HOSPADM

## 2023-09-15 RX ORDER — METHOCARBAMOL 500 MG/1
500 TABLET, FILM COATED ORAL EVERY 6 HOURS PRN
Status: DISCONTINUED | OUTPATIENT
Start: 2023-09-15 | End: 2023-09-25 | Stop reason: HOSPADM

## 2023-09-15 RX ORDER — LANOLIN ALCOHOL/MO/W.PET/CERES
3 CREAM (GRAM) TOPICAL
Status: DISCONTINUED | OUTPATIENT
Start: 2023-09-15 | End: 2023-09-25 | Stop reason: HOSPADM

## 2023-09-15 RX ORDER — DULOXETIN HYDROCHLORIDE 60 MG/1
60 CAPSULE, DELAYED RELEASE ORAL DAILY
Status: DISCONTINUED | OUTPATIENT
Start: 2023-09-16 | End: 2023-09-18

## 2023-09-15 RX ORDER — AMLODIPINE BESYLATE 10 MG/1
10 TABLET ORAL DAILY
Status: DISCONTINUED | OUTPATIENT
Start: 2023-09-15 | End: 2023-09-25 | Stop reason: HOSPADM

## 2023-09-15 RX ADMIN — PANTOPRAZOLE SODIUM 40 MG: 40 TABLET, DELAYED RELEASE ORAL at 11:54

## 2023-09-15 RX ADMIN — AMLODIPINE BESYLATE 10 MG: 10 TABLET ORAL at 10:39

## 2023-09-15 RX ADMIN — CLONAZEPAM 0.5 MG: 0.5 TABLET ORAL at 08:11

## 2023-09-15 RX ADMIN — HYDROXYZINE HYDROCHLORIDE 25 MG: 25 TABLET ORAL at 16:57

## 2023-09-15 RX ADMIN — LORAZEPAM 0.5 MG: 0.5 TABLET ORAL at 00:52

## 2023-09-15 RX ADMIN — FERROUS SULFATE TAB 325 MG (65 MG ELEMENTAL FE) 325 MG: 325 (65 FE) TAB at 10:39

## 2023-09-15 RX ADMIN — CLONAZEPAM 1 MG: 1 TABLET ORAL at 21:09

## 2023-09-15 RX ADMIN — Medication 2 G: at 11:55

## 2023-09-15 RX ADMIN — PREGABALIN 50 MG: 50 CAPSULE ORAL at 15:58

## 2023-09-15 RX ADMIN — DULOXETINE HYDROCHLORIDE 90 MG: 30 CAPSULE, DELAYED RELEASE ORAL at 08:11

## 2023-09-15 RX ADMIN — ATORVASTATIN CALCIUM 40 MG: 40 TABLET, FILM COATED ORAL at 16:55

## 2023-09-15 RX ADMIN — VALBENAZINE 40 MG: 40 CAPSULE ORAL at 08:11

## 2023-09-15 RX ADMIN — PREGABALIN 50 MG: 50 CAPSULE ORAL at 08:11

## 2023-09-15 RX ADMIN — Medication 2 G: at 16:02

## 2023-09-15 RX ADMIN — ASPIRIN 81 MG: 81 TABLET, COATED ORAL at 10:39

## 2023-09-15 RX ADMIN — MELATONIN TAB 3 MG 3 MG: 3 TAB at 21:09

## 2023-09-15 RX ADMIN — QUETIAPINE FUMARATE 150 MG: 50 TABLET, EXTENDED RELEASE ORAL at 21:09

## 2023-09-15 RX ADMIN — ERGOCALCIFEROL 50000 UNITS: 1.25 CAPSULE ORAL at 15:58

## 2023-09-15 RX ADMIN — ESCITALOPRAM 5 MG: 5 TABLET, FILM COATED ORAL at 10:39

## 2023-09-15 RX ADMIN — PREGABALIN 50 MG: 50 CAPSULE ORAL at 21:09

## 2023-09-15 NOTE — PLAN OF CARE
Problem: Potential for Falls  Goal: Patient will remain free of falls  Description: INTERVENTIONS:  - Educate patient/family on patient safety including physical limitations  - Instruct patient to call for assistance with activity   - Consult OT/PT to assist with strengthening/mobility   - Keep Call bell within reach  - Keep bed low and locked with side rails adjusted as appropriate  - Keep care items and personal belongings within reach  - Initiate and maintain comfort rounds  - Make Fall Risk Sign visible to staff  - Offer Toileting every Hours, in advance of need  - Initiate/Maintain alarm  - Obtain necessary fall risk management equipment:   - Apply yellow socks and bracelet for high fall risk patients  - Consider moving patient to room near nurses station  Outcome: Progressing     Problem: Depression  Goal: Verbalize thoughts and feelings  Description: Interventions:  - Assess and re-assess patient's level of risk   - Engage patient in 1:1 interactions, daily, for a minimum of 15 minutes   - Encourage patient to express feelings, fears, frustrations, hopes   Outcome: Progressing     Problem: Depression  Goal: Attend and participate in unit activities, including therapeutic, recreational, and educational groups  Description: Interventions:  - Provide therapeutic and educational activities daily, encourage attendance and participation, and document same in the medical record   Outcome: Progressing

## 2023-09-15 NOTE — PLAN OF CARE
Pt attends groups and participates.     Problem: Depression  Goal: Attend and participate in unit activities, including therapeutic, recreational, and educational groups  Description: Interventions:  - Provide therapeutic and educational activities daily, encourage attendance and participation, and document same in the medical record   Outcome: Progressing

## 2023-09-15 NOTE — ASSESSMENT & PLAN NOTE
· Patient has muscle spasms in her back and she will continue to take her outpatient Robaxin 500 mg p.o. every 6 as needed muscle spasms

## 2023-09-15 NOTE — PROGRESS NOTES
09/15/23 1408   [facility] Admission Notification   Notification of Admission Provided to: Psychiatrist   Psychiatrist Notified via: Phone call  ( [address] [facility])     Calli Pepe ([phone]) at the clinic to advise of the admission.

## 2023-09-15 NOTE — TREATMENT TEAM
Pt attended 1 am group. Pt anxious and noted her reasons for admisson. Pt in wheelchair. Pt able to make needs known and provide a coping skill. 09/15/23 1000   Activity/Group Checklist   Group [employer] Attended; Other (Comment)  (late)   Attendance Duration (min) 31-45   Interactions Interacted appropriately   Affect/Mood Other (Comment)  (anxious)   Goals Achieved Identified feelings; Identified relapse prevention strategies; Discussed coping strategies; Able to listen to others; Able to engage in interactions; Able to reflect/comment on own behavior;Able to self-disclose

## 2023-09-15 NOTE — PROGRESS NOTES
09/15/23 0807   Team Meeting   Meeting Type Daily Rounds   Initial Conference Date 09/15/23   Next Conference Date 09/18/23   Team Members Present   Team Members Present Physician;Nurse;;Occupational Therapist;   Physician Team Member Dr Fartun Woodward Team Member [facility] Management Team Member [facility] Work Team Member Diogo   OT Team Member    Patient/Family Present   Patient Present No   Patient's Family Present No     At ED multiple times, called PD on herself, on multiple meds, offered PHP and said it will not help her

## 2023-09-15 NOTE — NURSING NOTE
Patient is alert and oriented able to make her needs known. C/O pain PRN ibuprofen 400 mg administered at 2318 for generalized pain and PRN atarax administered for anxiety. Patient is medication compliant. Patient is pleasant and cooperative on approach. She is visible in milieu interacting with peers. No other issues or concerns at this time. Safety checks ongoing.

## 2023-09-15 NOTE — PLAN OF CARE
Problem: Potential for Falls  Goal: Patient will remain free of falls  Description: INTERVENTIONS:  - Educate patient/family on patient safety including physical limitations  - Instruct patient to call for assistance with activity   - Consult OT/PT to assist with strengthening/mobility   - Keep Call bell within reach  - Keep bed low and locked with side rails adjusted as appropriate  - Keep care items and personal belongings within reach  - Initiate and maintain comfort rounds  - Make Fall Risk Sign visible to staff  - Apply yellow socks and bracelet for high fall risk patients  - Consider moving patient to room near nurses station  Outcome: Progressing     Problem: Depression  Goal: Treatment Goal: Demonstrate behavioral control of depressive symptoms, verbalize feelings of improved mood/affect, and adopt new coping skills prior to discharge  Outcome: Progressing  Goal: Verbalize thoughts and feelings  Description: Interventions:  - Assess and re-assess patient's level of risk   - Engage patient in 1:1 interactions, daily, for a minimum of 15 minutes   - Encourage patient to express feelings, fears, frustrations, hopes   Outcome: Progressing  Goal: Refrain from harming self  Description: Interventions:  - Monitor patient closely, per order   - Supervise medication ingestion, monitor effects and side effects   Outcome: Progressing  Goal: Refrain from isolation  Description: Interventions:  - Develop a trusting relationship   - Encourage socialization   Outcome: Progressing  Goal: Refrain from self-neglect  Outcome: Progressing  Goal: Complete daily ADLs, including personal hygiene independently, as able  Description: Interventions:  - Observe, teach, and assist patient with ADLS  -  Monitor and promote a balance of rest/activity, with adequate nutrition and elimination   Outcome: Progressing     Problem: Anxiety  Goal: Anxiety is at manageable level  Description: Interventions:  - Assess and monitor patient's anxiety level. - Monitor for signs and symptoms (heart palpitations, chest pain, shortness of breath, headaches, nausea, feeling jumpy, restlessness, irritable, apprehensive). - Collaborate with interdisciplinary team and initiate plan and interventions as ordered. - [place] patient to unit/surroundings  - Explain treatment plan  - Encourage participation in care  - Encourage verbalization of concerns/fears  - Identify coping mechanisms  - Assist in developing anxiety-reducing skills  - Administer/offer alternative therapies  - Limit or eliminate stimulants  Outcome: Progressing     Problem: BEHAVIOR  Goal: Pt/Family maintain appropriate behavior and adhere to behavioral management agreement, if implemented  Description: INTERVENTIONS:  - Assess the family dynamic   - Encourage verbalization of thoughts and concerns in a socially appropriate manner  - Assess patient/family's coping skills and non-compliant behavior (including use of illegal substances). - Utilize positive, consistent limit setting strategies supporting safety of patient, staff and others  - Initiate consult with Case Management, Spiritual Care or other ancillary services as appropriate  - If a patient's/visitor's behavior jeopardizes the safety of the patient, staff, or others, refer to organization procedure.    - Notify Security of behavior or suspected illegal substances which indicate the need for search of the patient and/or belongings  - Encourage participation in the decision making process about a behavioral management agreement; implement if patient meets criteria  Outcome: Progressing     Problem: SLEEP DISTURBANCE  Goal: Will exhibit normal sleeping pattern  Description: Interventions:  -  Assess the patients sleep pattern, noting recent changes  - Administer medication as ordered  - Decrease environmental stimuli, including noise, as appropriate during the night  - Encourage the patient to actively participate in unit groups and or exercise during the day to enhance ability to achieve adequate sleep at night  - Assess the patient, in the morning, encouraging a description of sleep experience  Outcome: Progressing     Problem: Prexisting or High Potential for Compromised Skin Integrity  Goal: Skin integrity is maintained or improved  Description: INTERVENTIONS:  - Identify patients at risk for skin breakdown  - Assess and monitor skin integrity  - Assess and monitor nutrition and hydration status  - Monitor labs   - Assess for incontinence   - Turn and reposition patient  - Assist with mobility/ambulation  - Relieve pressure over bony prominences  - Avoid friction and shearing  - Provide appropriate hygiene as needed including keeping skin clean and dry  - Evaluate need for skin moisturizer/barrier cream  - Collaborate with interdisciplinary team   - Patient/family teaching  - Consider wound care consult   Outcome: Progressing

## 2023-09-15 NOTE — ASSESSMENT & PLAN NOTE
· Vital signs stable afebrile patient seen and examined by myself Labs from 9/15/2023 reviewed sodium 139 potassium 3.8 creatinine 0.80 TSH 1.189  · Patient medically cleared for admit  · SL IM will sign off please call with any questions or concerns

## 2023-09-15 NOTE — PROGRESS NOTES
09/15/23 1314   Crisis Info   Release of Information Signed Yes  ([facility] - [address] [facility]; [address]; Ashwini Patton (daughter) [phone]; Margo Rollins (son) [phone]; Davi Avendaño (son in law & caretaker ([phone]);  Jason (waiver services))

## 2023-09-15 NOTE — CMS CERTIFICATION NOTE
Certification: Based upon physical, mental and social evaluations, I certify that inpatient psychiatric services are medically necessary for this patient for a duration of 21 midnights for the treatment of Panic disorder without agoraphobia    Available alternative community resources do not meet the patient's mental health care needs. I further attest that an established written individualized plan of care has been implemented and is outlined in the patient's medical records.
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
## 2023-09-15 NOTE — TREATMENT TEAM
09/15/23 0000   Puente Anxiety Scale   Anxious Mood 3   Tension 3   Fears 3   Insomnia 3   Intellectual 1   Depressed Mood 3   Somatic Complaints: Muscular 0   Somatic Complaints: Sensory 0   Cardiovascular Symptoms 0   Respiratory Symptoms 1   Gastrointestinal Symptoms 0   Genitourinary Symptoms 0   Autonomic Symptoms 0   Behavior at Interview 3   Puente Anxiety Score 20     PRN ativan 0.5 mg administered for puente scale 20

## 2023-09-15 NOTE — TREATMENT PLAN
TREATMENT PLAN REVIEW - Behavioral Health Samantha Lucio 59 y.o. 1965 female MRN: [id number]    [address] Room / Bed: Leonardo Hooker [facility] [id number] Encounter: [id number]          Admit Date/Time:  9/14/2023 12:50 AM    Treatment Team: Attending Provider: Anola Mcburney, MD; Nurse Manager: Najma Arias RN; Registered Nurse: Traci German RN; Patient Care Assistant: Desi Ramos;  Patient Care Assistant: Omero Esquivel; Patient Care Assistant: Domenic Fuentes; : Yue Hermosillo [facility]; Patient Care Assistant: Roderick Babinski; Patient Care Assistant: Romeo Lubin; Patient Care Assistant: Nancy Roth    Diagnosis: Principal Problem:    Panic disorder without agoraphobia  Active Problems:    Bipolar disorder ([address])    Generalized anxiety disorder    Post traumatic stress disorder    Lumbar radiculopathy    GERD without esophagitis    Essential hypertension    Tardive dyskinesia    Medical clearance for psychiatric admission    Mixed hyperlipidemia    Anemia    Muscle spasm      Patient Strengths/Assets: cooperative, communication skills, family ties    Patient Barriers/Limitations: difficulty adapting, limited support system, poor physical health, resistance to treatment, unresourceful    Short Term Goals: decrease in anxiety symptoms, ability to stay safe on the unit, ability to stay free of restraints, sleep improvement, mood stabilization, decrease in panic attacks    Long Term Goals: improvement in anxiety, stabilization of mood, free of suicidal thoughts, free of homicidal thoughts, improvement in reality testing, improved insight, able to express basic needs    Progress Towards Goals: starting psychiatric medications as prescribed    Recommended Treatment: medication management, patient medication education, group therapy, milieu therapy, continued Behavioral Health psychiatric evaluation/assessment process    Treatment Frequency: daily medication monitoring, group and milieu therapy daily, monitoring through interdisciplinary rounds, monitoring through weekly patient care conferences    Expected Discharge Date:  14 days    Discharge Plan: referral for outpatient medication management with a psychiatrist, referral for outpatient psychotherapy, return to previous living arrangement vs. Placement at higher level of care (e.g NH)    Treatment Plan Created/Updated By: Ngoc Suero MD

## 2023-09-15 NOTE — PROGRESS NOTES
09/15/23 1254   Referral Data   Referral Reason [address]   Readmission Root Cause   30 Day Readmission No   Patient Information   Mental Status Alert   Primary Caregiver Family   Support System Immediate family; Extended family   Adventism/Cultural Requests Non Worship   Legal Information   Tx Plan Signed Yes   Current Status: 201   Legal Issues denies   Health Care Proxy Appointed Yes - [facility] Proxy section   Activities of Daily Living Prior to Admission   Functional Status Minimum assistance   Assistive Device Walker  (uses walker on the unit, reports that she does not use a walker at home)   Living Arrangement House;Lives with someone  (daughter, son in law (who is her caregiver), and grandchildren)   Ambulation Minimum assistance   Access to Firearms   Access to Firearms No   Income [address] [place] [address] SSI/SSD   [employer] of Transportation   [email or web address] of Transport to [facility]: [facility]  (Gets 100 rides a year with insurance and lanta)

## 2023-09-15 NOTE — NURSING NOTE
Pt says she is feeling "eh" this morning. Pt frequently asking for her medications. She is cooperative with care. Social in the milieu. Currently denying any unmet needs.

## 2023-09-15 NOTE — CONSULTS
[address] [facility]  Consult  Name: Roman Acharya 59 y.o. female I MRN: [id number]  Unit/Bed#: [facility] 731-34  Date of Admission: 9/14/2023   Date of Service: 9/15/2023 I Hospital Day: 1    Inpatient consult for Medical Clearance for [facility] patient  Consult performed by: MERCY Gr  Consult ordered by: MERCY Shah          Assessment/Plan   Medical clearance for psychiatric admission  Assessment & Plan  · Vital signs stable afebrile patient seen and examined by myself Labs from 9/15/2023 reviewed sodium 139 potassium 3.8 creatinine 0.80 TSH 1.189  · Patient medically cleared for admit  · [facility] IM will sign off please call with any questions or concerns    Muscle spasm  Assessment & Plan  · Patient has muscle spasms in her back and she will continue to take her outpatient Robaxin 500 mg p.o. every 6 as needed muscle spasms    Anemia  Assessment & Plan  · Patient will continue with her outpatient ferrous sulfate 325 mg p.o. every other day    Mixed hyperlipidemia  Assessment & Plan  · Patient will continue to take her outpatient Lipitor 40 mg p.o. daily    Tardive dyskinesia  Assessment & Plan  · Patient will continue on her outpatient Valbenazine    Essential hypertension  Assessment & Plan  · Patient will continue on her enteric-coated aspirin 81 mg p.o. daily   ·  patient will continue on her outpatient amlodipine 10 mg p.o. daily    GERD without esophagitis  Assessment & Plan  · Protonix 40 mg po daily    Lumbar radiculopathy  Assessment & Plan  · Patient has a spinal cord stimulator in place however she has left her remote at home           Counseling / Coordination of Care Time: 45 minutes. Greater than 50% of total time spent on patient counseling and coordination of care. Collaboration of Care:  Were Recommendations Directly Discussed with Primary Treatment Team? - No     History of Present Illness:    Samantha Swartz is a 59 y.o. female who is originally admitted to the psychiatry service due to anxiety and panic attacks. We are consulted for medical clearance for admission to [facility] Unit and treatment of underlying psychiatric illness. Patient presents on 9/14/2023 to [facility] ED [facility] at her request secondary to panic attacks she had also been in the ER on 9/11/2023 with the same symptoms patient has had a prior admission for the same symptoms in 2021. Past medical history is extensive for anxiety and panic attacks she has hypertension hyperlipidemia tardive dyskinesia iron deficiency anemia she has lumbar radiculopathy with a spinal cord stimulator and muscle spasms    Review of Systems:    Review of Systems   Constitutional: Negative for chills and fever. HENT: Negative for ear pain and sore throat. Eyes: Negative for pain and visual disturbance. Respiratory: Negative for cough and shortness of breath. Cardiovascular: Negative for chest pain and palpitations. Gastrointestinal: Positive for abdominal distention and abdominal pain. Negative for vomiting. Genitourinary: Negative for dysuria and hematuria. Musculoskeletal: Positive for back pain and gait problem. Negative for arthralgias. Skin: Negative for color change and rash. Neurological: Negative for seizures and syncope. Psychiatric/Behavioral: The patient is nervous/anxious. All other systems reviewed and are negative. Past Medical and Surgical History:     Past Medical History:   Diagnosis Date   • Anxiety    • Arthritis     left knee   • Arthritis of right knee 10/6/2020   • At risk for falls    • Bipolar 2 disorder ([address])     FOLLOWS WITH PSYCHIATRIST.  CONTINUE LAMOTRIGINE; RESOLVED: [id number]   • Depression    • Familial tremor     both hands   • Fibromyalgia     LAST ASSESSED: [id number]   • GERD (gastroesophageal reflux disease)    • Hearing aid worn     left ear   • [place] (hard of hearing)     left ear   • Hyperlipidemia    • Hypertension    • Left-sided weakness    • Lumbar disc disease with radiculopathy 2018   • Memory loss of unknown cause     long and short term   • Migraine    • Multiple closed fractures of metatarsal bone of right foot 2021   • Obesity    • Obesity, Class II, BMI 35-39.9    • Osteoarthritis of both hips 10/31/2016   • Osteoarthritis of knee 2013    Description: Continue Tylenol and Naproxen. Encourage exercise and weight loss. Patient refused physical therapy. I will refer the patient back to Orthopedics. • Overactive bladder    • Panic attack    • Patellofemoral disorder of both knees 2020   • Post traumatic stress disorder    • Primary localized osteoarthritis of both knees 2017   • Primary osteoarthritis of both knees 2020   • S/P insertion of spinal cord stimulator     no remote   • S/P total knee arthroplasty, right 3/10/2022   • Sacroiliitis ([address]) 2017   • Seasonal allergies    • Seizure-like activity ([address]) 6/3/2022   • Seizures ([address])     possible seizure like activity   • Small bowel obstruction ([address]) 3/24/2023   • Status post total knee replacement, left 2022   • Stroke [facility])     questionable stroke    • Tardive dyskinesia     PATIENT STATES   • 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   CONT ASA 81 MG DAILY; RESOLVED: [id number]   • Urinary incontinence    • Uses walker    • Wears dentures     partial lower / full upper- doesnt wear   • Wears glasses        Past Surgical History:   Procedure Laterality Date   • BACK SURGERY     •  SECTION     • COLONOSCOPY      RESOLVED: [id number]   • EAR SURGERY     • EGD     • HYSTERECTOMY     • MYRINGOTOMY W/ TUBES Left    • NECK SURGERY  2019   • [facility] ARTHRP KNE CONDYLE&PLATU MEDIAL&LAT COMPARTMENTS Right 3/9/2022    Procedure: ARTHROPLASTY KNEE TOTAL;  Surgeon: London Johnson DO;  Location: [facility];  Service: Orthopedics   • VA ARTHRP KNE CONDYLE&PLATU MEDIAL&LAT COMPARTMENTS Left 7/5/2022    Procedure: ARTHROPLASTY KNEE TOTAL;  Surgeon: Tyrell Waller DO;  Location[facility];  Service: Orthopedics   • CT CYSTOURETHROSCOPY N/A 2/18/2016    Procedure: CYSTOSCOPY, BOTOX INJECTION;  Surgeon: Tasha Castro MD;  Location: [facility];  Service: Gynecology   • CT INSJ/RPLCMT SPI NPGR DIR/INDUXIVE COUPLING Right 2/10/2021    Procedure: REPLACEMENT IMPLANTABLE PULSE GENERATOR DORSAL SPINAL COLUMN STIMULATOR, RIGHT;  Surgeon: Argenis Ramos MD;  Location: [facility];  Service: Neurosurgery   • CT PRQ IMPLTJ NSTIM ELECTRODE ARRAY EPIDURAL Right 7/28/2020    Procedure: INSERTION THORACIC DORSAL COLUMN SPINAL CORD STIMULATOR PERCUTANEOUS W IMPLANTABLE PULSE GENERATOR, RIGHT;  Surgeon: Argenis Ramos MD;  Location: [facility];  Service: Neurosurgery   • TONSILLECTOMY     • TUBAL LIGATION  1986   • UPPER GASTROINTESTINAL ENDOSCOPY  09/2020       Meds/Allergies:    all medications and allergies reviewed    Allergies: No Known Allergies    Social History:     Marital Status:     Substance Use History:   Social History     Substance and Sexual Activity   Alcohol Use Not Currently    Comment: 2 x year; being a social drinker as per all scripts      Social History     Tobacco Use   Smoking Status Never   Smokeless Tobacco Never     Social History     Substance and Sexual Activity   Drug Use No       Family History:    non-contributory    Physical Exam:     Vitals:   Blood Pressure: 124/70 (09/15/23 1039)  Pulse: 70 (09/15/23 0729)  Temperature: (!) 97 °F (36.1 °C) (09/15/23 0729)  Temp Source: Temporal (09/15/23 0729)  Respirations: 16 (09/15/23 0729)  Height: 5' 1" (154.9 cm) (09/14/23 1521)  SpO2: 98 % (09/15/23 0729)    Physical Exam  Constitutional:       General: She is not in acute distress. Appearance: Normal appearance. She is not ill-appearing. HENT:      Head: Normocephalic and atraumatic.       Nose: Nose normal.      Mouth/Throat:      Mouth: Mucous membranes are moist. Eyes:      Extraocular Movements: Extraocular movements intact. Cardiovascular:      Rate and Rhythm: Normal rate and regular rhythm. Heart sounds: Normal heart sounds. Pulmonary:      Breath sounds: Normal breath sounds. No wheezing. Abdominal:      General: Abdomen is flat. Bowel sounds are normal. There is no distension. Palpations: Abdomen is soft. There is no mass. Tenderness: There is no abdominal tenderness. There is no right CVA tenderness, left CVA tenderness, guarding or rebound. Hernia: No hernia is present. Musculoskeletal:         General: Deformity present. Skin:     General: Skin is warm and dry. Neurological:      Mental Status: She is alert. Additional Data:     Lab Results: I have personally reviewed pertinent reports.       Results from last 7 days   Lab Units 09/15/23  0458   WBC Thousand/uL 3.88*   HEMOGLOBIN g/dL 13.5   HEMATOCRIT % 41.2   PLATELETS Thousands/uL 268   NEUTROS PCT % 34*   LYMPHS PCT % 47*   MONOS PCT % 8   EOS PCT % 9*     Results from last 7 days   Lab Units 09/15/23  0458   SODIUM mmol/L 139   POTASSIUM mmol/L 3.8   CHLORIDE mmol/L 105   CO2 mmol/L 26   BUN mg/dL 16   CREATININE mg/dL 0.80   ANION GAP mmol/L 8   CALCIUM mg/dL 8.6   ALBUMIN g/dL 4.1   TOTAL BILIRUBIN mg/dL 0.92   ALK PHOS U/L 78   ALT U/L 13   AST U/L 18   GLUCOSE RANDOM mg/dL 91             Lab Results   Component Value Date/Time    HGBA1C 4.5 07/19/2023 05:20 AM    HGBA1C 4.8 06/28/2022 12:53 PM    HGBA1C 4.7 03/05/2022 08:54 AM    HGBA1C 4.6 03/01/2022 09:51 AM    HGBA1C 4.8 02/08/2021 05:56 AM           EKG, Pathology, and Other Studies Reviewed on Admission:   · EKG 9/14/2023 twelve-lead EKG reviewed by myself as well as interpreted by myself ventricular rate 67 QT interval 438 QTc 462 normal sinus rhythm when compared with an EKG of September 11, 2023 there are no new acute EKG findings and EKG was also reviewed from July 2023 with no new acute EKG findings also    ** Please Note: This note has been constructed using a voice recognition system. **    I have spent a total time of 45 minutes on 09/15/23 in caring for this patient including Patient and family education, Importance of tx compliance, Risk factor reductions, Impressions, Counseling / Coordination of care, Documenting in the medical record, Reviewing / ordering tests, medicine, procedures  , Obtaining or reviewing history   and Communicating with other healthcare professionals .
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
## 2023-09-15 NOTE — ASSESSMENT & PLAN NOTE
· Patient will continue on her enteric-coated aspirin 81 mg p.o. daily   ·  patient will continue on her outpatient amlodipine 10 mg p.o. daily

## 2023-09-16 PROCEDURE — 99232 SBSQ HOSP IP/OBS MODERATE 35: CPT

## 2023-09-16 RX ADMIN — ASPIRIN 81 MG: 81 TABLET, COATED ORAL at 09:18

## 2023-09-16 RX ADMIN — ESCITALOPRAM OXALATE 10 MG: 10 TABLET ORAL at 09:19

## 2023-09-16 RX ADMIN — CLONAZEPAM 0.5 MG: 0.5 TABLET ORAL at 09:18

## 2023-09-16 RX ADMIN — PANTOPRAZOLE SODIUM 40 MG: 40 TABLET, DELAYED RELEASE ORAL at 05:55

## 2023-09-16 RX ADMIN — PREGABALIN 50 MG: 50 CAPSULE ORAL at 09:19

## 2023-09-16 RX ADMIN — ATORVASTATIN CALCIUM 40 MG: 40 TABLET, FILM COATED ORAL at 15:35

## 2023-09-16 RX ADMIN — CLONAZEPAM 1 MG: 1 TABLET ORAL at 21:14

## 2023-09-16 RX ADMIN — VALBENAZINE 40 MG: 40 CAPSULE ORAL at 09:18

## 2023-09-16 RX ADMIN — PREGABALIN 50 MG: 50 CAPSULE ORAL at 21:14

## 2023-09-16 RX ADMIN — DULOXETINE HYDROCHLORIDE 60 MG: 60 CAPSULE, DELAYED RELEASE ORAL at 09:18

## 2023-09-16 RX ADMIN — QUETIAPINE FUMARATE 150 MG: 50 TABLET, EXTENDED RELEASE ORAL at 21:14

## 2023-09-16 RX ADMIN — AMLODIPINE BESYLATE 10 MG: 10 TABLET ORAL at 09:04

## 2023-09-16 RX ADMIN — MELATONIN TAB 3 MG 3 MG: 3 TAB at 21:14

## 2023-09-16 RX ADMIN — PREGABALIN 50 MG: 50 CAPSULE ORAL at 15:35

## 2023-09-16 NOTE — NURSING NOTE
Patient is medication and meal compliant. No complaints of pain or discomfort. Patient denies depression but endorses anxiety saying she gets "panic attacks". Patient denies current SI, AH or VH. Patient is visible and social with peers on the unit. Will continue to monitor patient for changes in mood or behavior.

## 2023-09-16 NOTE — PROGRESS NOTES
Psychiatric Progress Note - Department of Behavioral Health   Samantha Cortes 59 y.o. female MRN: [id number]  Unit/Bed#: [facility] 512-57 Encounter: [id number]    ASSESSMENT & PLAN     Principal Problem:    Panic disorder without agoraphobia  Active Problems:    Lumbar radiculopathy    Bipolar disorder ([address])    GERD without esophagitis    Generalized anxiety disorder    Essential hypertension    Post traumatic stress disorder    Tardive dyskinesia    Medical clearance for psychiatric admission    Mixed hyperlipidemia    Anemia    Muscle spasm      • Continue to promote patient participation in therapeutic milieu. • Continue medical management per medicine. • Continue previously prescribed psychotropic medication regimen; see below. • Continue behavioral health checks q.7 minutes. • Continue vitals per behavioral health unit protocol. • Discharge date per primary team    SUBJECTIVE   Patient was seen today for continuation of care, records reviewed and patient was discussed with the morning case review team. Patient was seen today for psychiatric follow-up. Per nursing report patient was initially isolative but was visible in the milieu and sitting with peer in the dining room watching a movie. Patient was cooperative and compliant with medication and meals. Upon approach, patient was resting in bed, was calm, cooperative, and pleasant. Patient rates both her depression and anxiety a 7/10. Patient complains of back pain, which has been partially alleviated with Lyrica. Patient reports her mood has been better today and had the best sleep in a long time. Appetite has been good. Patient reports 1 mild panic attack yesterday and denies panic attacks today. Patient reports her medications have been effective and denies any side effects. Patient denies AH/VH, SI/HI and does not appear overtly manic. No overt delusions or paranoia are verbalized. Impulse control is intact.  Herve Godinez remains adherent to his current psychotropic medication regimen and denies any side effects from medications, as well as none noted on exam.        Behavior over the last 24 hours:  unchanged  Sleep: adequate  Appetite: adequate  Medication side effects: No  ROS: no complaints and all other systems are negative    PSYCHIATRIC REVIEW OF SYSTEMS     Behavior over the last 24 hours:  unchanged  Sleep: normal  Appetite: normal  Medication side effects: No    REVIEW OF SYSTEMS   Review of systems: Back pain    OBJECTIVE     Vital Signs in Past 24 Hours:  Temp:  [96.9 °F (36.1 °C)-97.6 °F (36.4 °C)] 97.6 °F (36.4 °C)  HR:  [67-74] 67  Resp:  [15-16] 16  BP: (116-136)/(66-83) 124/66    Intake/Output in Past 24 hours:  I/O last 3 completed shifts: In: 380 [P.O.:380]  Out: -   I/O this shift:  In: 1340 [P.O.:1340]  Out: -         Laboratory Results:  I have personally reviewed all pertinent laboratory/tests results. Behavioral Health Medications: all current active meds have been reviewed.     Current Facility-Administered Medications   Medication Dose Route Frequency Provider Last Rate   • amLODIPine  10 mg Oral Daily MERCY Cisneros     • aspirin  81 mg Oral Daily MERCY Cisneros     • atorvastatin  40 mg Oral Daily With [employer]     • clonazePAM  0.5 mg Oral Daily Johnathan Gonzalez MD     • clonazePAM  1 mg Oral HS Johnathan Gonzalez MD     • Diclofenac Sodium  2 g Topical 4x Daily PRN MERCY Cisneros     • DULoxetine  60 mg Oral Daily Johnathan Gonzalez MD     • ergocalciferol  50,000 Units Oral Weekly MERCY Cisneros     • escitalopram  10 mg Oral Daily Johnathan Gonzalez MD     • ferrous sulfate  325 mg Oral Every Other Day MERCY Cisneros     • haloperidol lactate  5 mg Intramuscular Q4H PRN Max 4/day MERCY Calvo     • hydrOXYzine HCL  25 mg Oral Q6H PRN Max 4/day MERCY Calvo     • ibuprofen  200 mg Oral Q6H PRN MERCY Calvo     • ibuprofen  400 mg Oral Q6H PRN Nancy Quirk, CRNP     • ibuprofen  600 mg Oral Q8H PRN Nancy Quirk, CRNP     • LORazepam  1 mg Intramuscular Q6H PRN Max 3/day Nancy Quirk, CRNP     • LORazepam  0.5 mg Oral Q6H PRN Max 4/day Nancy Quirk, CRNP     • LORazepam  1 mg Oral Q6H PRN Max 3/day Nancy Quirk, CRNP     • melatonin  3 mg Oral HS Leopoldo Priest MD     • methocarbamol  500 mg Oral Q6H PRN Sandra Dixon, CRNP     • pantoprazole  40 mg Oral Early Morning Sandra Dixon, CRNP     • polyethylene glycol  17 g Oral Daily PRN Nacny Quirk, CRNP     • pregabalin  50 mg Oral TID Leopoldo Priest MD     • QUEtiapine  150 mg Oral HS Nancy Quirk, CRNP     • risperiDONE  0.25 mg Oral Q4H PRN Max 6/day Nancy Quirk, CRNP     • risperiDONE  0.5 mg Oral Q4H PRN Max 3/day Nancy Quirk, CRNP     • risperiDONE  1 mg Oral Q2H PRN Max 3/day Nancy Quirk, CRNP     • senna-docusate sodium  1 tablet Oral Daily PRN Nancy Quirk, CRNP     • traZODone  50 mg Oral HS PRN Nancy Quirk, CRNP     • Valbenazine Tosylate  40 mg Oral Daily Nancy Quirk, CRNP         Risks, benefits and possible side effects of Medications:   Risks, benefits, and possible side effects of medications explained to patient and patient verbalizes understanding.         Mental Status Evaluation:  Appearance:  age appropriate and casually dressed   Behavior:  cooperative   Speech:  normal pitch and normal volume   Mood:  anxious and depressed   Affect:  constricted and mood-congruent   Language naming objects and repeating phrases   Thought Process:  goal directed   Thought Content:  normal   Perceptual Disturbances: None   Risk Potential: Suicidal Ideations none, Homicidal Ideations none and Potential for Aggression No   Sensorium:  person, place, time/date and situation   Cognition:  recent and remote memory grossly intact   Consciousness:  alert and awake    Attention: attention span and concentration were age appropriate   Insight:  fair Judgment: fair   Intellect    Gait/Station: unable to assess, patient in bed   Motor Activity: unable to assess, patient in bed     Memory: Short and long term memory  intact     Progress Toward Goals: Patient is progressing towards goals of inpatient psychiatric treatment by continued medication compliance and is attending therapeutic modalities on the milieu. However, the patient continues to require inpatient psychiatric hospitalization for continued medication management and titration to optimize symptom reduction, improve sleep hygiene, and demonstrate adequate self-care. Recommended Treatment:   See above for assessment and plan. Inpatient Psychiatric Certification: Estimated length of stay: More than 2 midnights. Counseling/Coordination of Care    Total unit time spent today ws greater than 15 minutes. Greater than 50% of total time was spent with the patient and/or patient's relatives and/or coordination of patient's care. Patient's rights, confidentiality, exceptions to confidentiality, use of electronic medical record including appropriate staff access to medical record regarding behavioral health services and consent to treatment were reviewed. Note Share: This note was not shared with the patient due to reasonable likelihood of causing patient harm     This note has been constructed using a voice recognition system. There may be translation, syntax, or grammatical errors. If you have any questions, please contact the dictating provider.     Zechariah Keith

## 2023-09-16 NOTE — NURSING NOTE
Patient initially isolative to self in room until late evening, noted to be sitting with peers in the dining room watching a movie after snack. Patient cooperative with assessment questions on approach, reporting moderate  Depression and severe anxiety, denies SI/HI/AVH. Patient frequently asking writer about various medications, easy to reassure. Compliant with medications and meals, appetite good. No further signs of distress noted.

## 2023-09-17 PROCEDURE — 99232 SBSQ HOSP IP/OBS MODERATE 35: CPT

## 2023-09-17 RX ORDER — HYDROCHLOROTHIAZIDE 12.5 MG/1
12.5 TABLET ORAL ONCE
Status: COMPLETED | OUTPATIENT
Start: 2023-09-17 | End: 2023-09-17

## 2023-09-17 RX ADMIN — CLONAZEPAM 1 MG: 1 TABLET ORAL at 21:40

## 2023-09-17 RX ADMIN — ATORVASTATIN CALCIUM 40 MG: 40 TABLET, FILM COATED ORAL at 15:31

## 2023-09-17 RX ADMIN — VALBENAZINE 40 MG: 40 CAPSULE ORAL at 08:30

## 2023-09-17 RX ADMIN — PREGABALIN 50 MG: 50 CAPSULE ORAL at 21:40

## 2023-09-17 RX ADMIN — QUETIAPINE FUMARATE 150 MG: 50 TABLET, EXTENDED RELEASE ORAL at 21:40

## 2023-09-17 RX ADMIN — FERROUS SULFATE TAB 325 MG (65 MG ELEMENTAL FE) 325 MG: 325 (65 FE) TAB at 08:31

## 2023-09-17 RX ADMIN — MELATONIN TAB 3 MG 3 MG: 3 TAB at 21:40

## 2023-09-17 RX ADMIN — ASPIRIN 81 MG: 81 TABLET, COATED ORAL at 08:30

## 2023-09-17 RX ADMIN — PREGABALIN 50 MG: 50 CAPSULE ORAL at 15:31

## 2023-09-17 RX ADMIN — AMLODIPINE BESYLATE 10 MG: 10 TABLET ORAL at 08:32

## 2023-09-17 RX ADMIN — DULOXETINE HYDROCHLORIDE 60 MG: 60 CAPSULE, DELAYED RELEASE ORAL at 08:30

## 2023-09-17 RX ADMIN — HYDROXYZINE HYDROCHLORIDE 25 MG: 25 TABLET ORAL at 12:19

## 2023-09-17 RX ADMIN — PANTOPRAZOLE SODIUM 40 MG: 40 TABLET, DELAYED RELEASE ORAL at 06:01

## 2023-09-17 RX ADMIN — PREGABALIN 50 MG: 50 CAPSULE ORAL at 08:31

## 2023-09-17 RX ADMIN — ESCITALOPRAM OXALATE 10 MG: 10 TABLET ORAL at 08:31

## 2023-09-17 RX ADMIN — CLONAZEPAM 0.5 MG: 0.5 TABLET ORAL at 08:31

## 2023-09-17 RX ADMIN — HYDROCHLOROTHIAZIDE 12.5 MG: 12.5 TABLET ORAL at 12:18

## 2023-09-17 NOTE — PROGRESS NOTES
Psychiatric Progress Note - Department of Behavioral Health   Samantha Chacon 59 y.o. female MRN: [id number]  Unit/Bed#: [facility] 996-55 Encounter: [id number]    ASSESSMENT & PLAN     Principal Problem:    Panic disorder without agoraphobia  Active Problems:    Lumbar radiculopathy    Bipolar disorder ([address])    GERD without esophagitis    Generalized anxiety disorder    Essential hypertension    Post traumatic stress disorder    Tardive dyskinesia    Medical clearance for psychiatric admission    Mixed hyperlipidemia    Anemia    Muscle spasm      • Continue to promote patient participation in therapeutic milieu. • Continue medical management per medicine. • Continue previously prescribed psychotropic medication regimen; see below. • Continue behavioral health checks q.7 minutes. • Continue vitals per behavioral health unit protocol. • Discharge date per primary team    SUBJECTIVE   Patient was seen today for continuation of care, records reviewed and patient was discussed with the morning case review team. Patient was seen today for psychiatric follow-up. Per nursing report patient was visible in the milieu, cooperative and compliant with medication and meals. Upon approach, patient was walking the halls with her walker, was calm, cooperative, and pleasant. Patient reports she had a mild panic attack and was given atarax with good results. No complaints pain and discomfort. Patient reports good sleep but had nightmares about her significant other cheating on her. Appetite has been good. Patient reports her medications have been effective and denies any side effects. Patient denies AH/VH, SI/HI and does not appear overtly manic. No overt delusions or paranoia are verbalized. Impulse control is intact.  Giselle Ham remains adherent to his current psychotropic medication regimen and denies any side effects from medications, as well as none noted on exam.        Behavior over the last 24 hours:  unchanged  Sleep: adequate  Appetite: adequate  Medication side effects: No  ROS: no complaints and all other systems are negative    PSYCHIATRIC REVIEW OF SYSTEMS     Behavior over the last 24 hours:  unchanged  Sleep: normal  Appetite: normal  Medication side effects: No    REVIEW OF SYSTEMS   Review of systems: Back pain    OBJECTIVE     Vital Signs in Past 24 Hours:  Temp:  [96 °F (35.6 °C)-97.8 °F (36.6 °C)] 96 °F (35.6 °C)  HR:  [56-97] 97  Resp:  [16] 16  BP: (122-131)/(66-80) 131/76    Intake/Output in Past 24 hours:  I/O last 3 completed shifts: In: 1340 [P.O.:1340]  Out: -   I/O this shift: In: 700 [P.O.:700]  Out: -         Laboratory Results:  I have personally reviewed all pertinent laboratory/tests results. Behavioral Health Medications: all current active meds have been reviewed.     Current Facility-Administered Medications   Medication Dose Route Frequency Provider Last Rate   • amLODIPine  10 mg Oral Daily MERCY Cisneros     • aspirin  81 mg Oral Daily MERCY Cisneros     • atorvastatin  40 mg Oral Daily With [employer]     • clonazePAM  0.5 mg Oral Daily Kevin Sanchez MD     • clonazePAM  1 mg Oral HS Kevin Sanchez MD     • Diclofenac Sodium  2 g Topical 4x Daily PRN MERCY Cisneros     • DULoxetine  60 mg Oral Daily Kevin Sanchez MD     • ergocalciferol  50,000 Units Oral Weekly MERCY Cisneros     • escitalopram  10 mg Oral Daily Kevin Sanchez MD     • ferrous sulfate  325 mg Oral Every Other Day MERCY Cisneros     • haloperidol lactate  5 mg Intramuscular Q4H PRN Max 4/day Tarry MERCY Woodard     • hydrOXYzine HCL  25 mg Oral Q6H PRN Max 4/day Mercedesry MERCY Woodard     • ibuprofen  200 mg Oral Q6H PRN Mercedesry MERCY Woodard     • ibuprofen  400 mg Oral Q6H PRN Mercedesry MERCY Woodard     • ibuprofen  600 mg Oral Q8H PRN MERCY Mcgrath     • LORazepam  1 mg Intramuscular Q6H PRN Max 3/day MERCY Mcgrath     • LORazepam 0.5 mg Oral Q6H PRN Max 4/day MERCY Bolton     • LORazepam  1 mg Oral Q6H PRN Max 3/day MERCY Bolton     • melatonin  3 mg Oral HS Mark Mccurdy MD     • methocarbamol  500 mg Oral Q6H PRN MERCY Cisneros     • pantoprazole  40 mg Oral Early Morning MERCY Cisneros     • polyethylene glycol  17 g Oral Daily PRN MERCY Bolton     • pregabalin  50 mg Oral TID Mark Mccurdy MD     • QUEtiapine  150 mg Oral HS MERCY Bolton     • risperiDONE  0.25 mg Oral Q4H PRN Max 6/day MERCY Bolton     • risperiDONE  0.5 mg Oral Q4H PRN Max 3/day MERCY Bolton     • risperiDONE  1 mg Oral Q2H PRN Max 3/day MERCY Bolton     • senna-docusate sodium  1 tablet Oral Daily PRN MERCY Bolton     • traZODone  50 mg Oral HS PRN MERCY Bolton     • Valbenazine Tosylate  40 mg Oral Daily MERCY Bolton         Risks, benefits and possible side effects of Medications:   Risks, benefits, and possible side effects of medications explained to patient and patient verbalizes understanding.         Mental Status Evaluation:  Appearance:  age appropriate and casually dressed   Behavior:  cooperative   Speech:  normal pitch and normal volume   Mood:  anxious and depressed   Affect:  constricted and mood-congruent   Language naming objects and repeating phrases   Thought Process:  goal directed   Thought Content:  normal   Perceptual Disturbances: None   Risk Potential: Suicidal Ideations none, Homicidal Ideations none and Potential for Aggression No   Sensorium:  person, place, time/date and situation   Cognition:  recent and remote memory grossly intact   Consciousness:  alert and awake    Attention: attention span and concentration were age appropriate   Insight:  fair   Judgment: fair   Intellect    Gait/Station: unable to assess, patient in bed   Motor Activity: unable to assess, patient in bed     Memory: Short and long term memory  intact Progress Toward Goals: Patient is progressing towards goals of inpatient psychiatric treatment by continued medication compliance and is attending therapeutic modalities on the milieu. However, the patient continues to require inpatient psychiatric hospitalization for continued medication management and titration to optimize symptom reduction, improve sleep hygiene, and demonstrate adequate self-care. Recommended Treatment:   See above for assessment and plan. Inpatient Psychiatric Certification: Estimated length of stay: More than 2 midnights. Counseling/Coordination of Care    Total unit time spent today ws greater than 15 minutes. Greater than 50% of total time was spent with the patient and/or patient's relatives and/or coordination of patient's care. Patient's rights, confidentiality, exceptions to confidentiality, use of electronic medical record including appropriate staff access to medical record regarding behavioral health services and consent to treatment were reviewed. Note Share: This note was not shared with the patient due to reasonable likelihood of causing patient harm     This note has been constructed using a voice recognition system. There may be translation, syntax, or grammatical errors. If you have any questions, please contact the dictating provider.     Mj Jules

## 2023-09-17 NOTE — NURSING NOTE
Patient is medication and meal compliant. No complaints of pain or discomfort. Patient endorses anxiety and reports she is here because she gets panic attacks. Patient is on scheduled klonopin for same. Patient denies SI, AH or VH. Patient was asking for a "diuretic" that she says she used "years ago"  Patient then pointed to her right ankle to show this nurse some swelling but there was minimal edema noted. Patient does have a history of CVA. Patient advised that we would have to check with SLIM provider to investigate as there is no diuretics listed on her prior to admission med list. SlIM provider notified of same. Patient is visible and social with peers on the unit. Will continue to monitor patient for changes in mood or behavior.

## 2023-09-17 NOTE — NURSING NOTE
Patient complained of anxiety and requested PRN Atarax for same. PRN Atarax given at 21 [phone] with effectiveness noted and patient reporting decrease in anxiety. Will continue to monitor patient for changes in mood or behavior.

## 2023-09-18 ENCOUNTER — PATIENT OUTREACH (OUTPATIENT)
Dept: INTERNAL MEDICINE CLINIC | Facility: CLINIC | Age: 58
End: 2023-09-18

## 2023-09-18 ENCOUNTER — APPOINTMENT (OUTPATIENT)
Dept: PHYSICAL THERAPY | Facility: CLINIC | Age: 58
End: 2023-09-18
Payer: MEDICARE

## 2023-09-18 PROCEDURE — 99232 SBSQ HOSP IP/OBS MODERATE 35: CPT | Performed by: STUDENT IN AN ORGANIZED HEALTH CARE EDUCATION/TRAINING PROGRAM

## 2023-09-18 PROCEDURE — 92610 EVALUATE SWALLOWING FUNCTION: CPT

## 2023-09-18 RX ORDER — PREGABALIN 75 MG/1
75 CAPSULE ORAL 3 TIMES DAILY
Status: DISCONTINUED | OUTPATIENT
Start: 2023-09-18 | End: 2023-09-20

## 2023-09-18 RX ORDER — DULOXETIN HYDROCHLORIDE 30 MG/1
30 CAPSULE, DELAYED RELEASE ORAL DAILY
Status: DISCONTINUED | OUTPATIENT
Start: 2023-09-19 | End: 2023-09-19

## 2023-09-18 RX ADMIN — ASPIRIN 81 MG: 81 TABLET, COATED ORAL at 08:03

## 2023-09-18 RX ADMIN — QUETIAPINE FUMARATE 150 MG: 50 TABLET, EXTENDED RELEASE ORAL at 21:05

## 2023-09-18 RX ADMIN — PREGABALIN 75 MG: 75 CAPSULE ORAL at 21:05

## 2023-09-18 RX ADMIN — CLONAZEPAM 0.5 MG: 0.5 TABLET ORAL at 08:03

## 2023-09-18 RX ADMIN — PANTOPRAZOLE SODIUM 40 MG: 40 TABLET, DELAYED RELEASE ORAL at 05:36

## 2023-09-18 RX ADMIN — AMLODIPINE BESYLATE 10 MG: 10 TABLET ORAL at 08:03

## 2023-09-18 RX ADMIN — ESCITALOPRAM OXALATE 10 MG: 10 TABLET ORAL at 08:03

## 2023-09-18 RX ADMIN — ATORVASTATIN CALCIUM 40 MG: 40 TABLET, FILM COATED ORAL at 15:33

## 2023-09-18 RX ADMIN — MELATONIN TAB 3 MG 3 MG: 3 TAB at 21:05

## 2023-09-18 RX ADMIN — DULOXETINE HYDROCHLORIDE 60 MG: 60 CAPSULE, DELAYED RELEASE ORAL at 08:03

## 2023-09-18 RX ADMIN — PREGABALIN 50 MG: 50 CAPSULE ORAL at 08:03

## 2023-09-18 RX ADMIN — CLONAZEPAM 1 MG: 1 TABLET ORAL at 21:05

## 2023-09-18 RX ADMIN — HYDROXYZINE HYDROCHLORIDE 25 MG: 25 TABLET ORAL at 16:09

## 2023-09-18 RX ADMIN — PREGABALIN 75 MG: 75 CAPSULE ORAL at 15:33

## 2023-09-18 RX ADMIN — VALBENAZINE 40 MG: 40 CAPSULE ORAL at 08:03

## 2023-09-18 NOTE — NURSING NOTE
Patient is medication and meal compliant. No complaints of pain or discomfort. Patient is visible on the unit and social with peers. Patient endorses both depression and anxiety but more so with anxiety as she reports getting panic attacks at times. No panic attacks noted this shift. Patient denies SI, AH, or VH. Will continue to monitor patient status.

## 2023-09-18 NOTE — PROGRESS NOTES
SW has called [facility] [phone] and spoke to Truth or consequences. SW was told the pt no longer has a bill with them but her application has  and she must complete a new application. SW reached out to [employer] [facility] at [facility] where pt is now an in patient. SW  advised her pt needs a new application competed  as well as a Face to Face evaluation to become eligible again for [facility] services. Ms Arlen Fernando will assist pt with the application including the [employer] [facility] certification form for the application. SW also shared  pt has used up almost all of her insurance rides. SW also suggested that if possible it could help to do an ICM referrel while she was and INPt. Ms. Arlen Fernando shared they are already were working on it and  they would reach ourt to [employer]. Patient has my contact # and PCP office # if needed. Social Work to remain available to assist as indicated. Please re-consult Social Work if needed.

## 2023-09-18 NOTE — PROGRESS NOTES
09/18/23 0810   Team Meeting   Meeting Type Daily Rounds   Initial Conference Date 09/18/23   Next Conference Date 09/19/23   Team Members Present   Team Members Present Physician;;Nurse;Occupational Therapist;   Physician Team Member Dr Macy Hager, Dr De Bacon Management Team Member [address] [place] [address] Work Team Member Diogo   OT Team Member Omero Lozano   Patient/Family Present   Patient Present No   Patient's Family Present No     PRN atarax, social, pleasant, increased klonopin, cross titration from Cymbalta to lexapro, may do virtual PHP, mild panic attacks here on unit.

## 2023-09-18 NOTE — CASE MANAGEMENT
Spoke to [employer] with [address], she is her SW-CM at the practice, she advised that she found out the patient does not owe [place] money at this time and would like us to do an application for David Espinoza for the patient. We are also [facility] to assist the patient with community connection.

## 2023-09-18 NOTE — NURSING NOTE
Pt withdrawn to the room throughout the evening hours. On approach, pt noted with a bible. Pt reported that she has been reading the bible most of the day. Denies any concerns and symptoms. Medication compliant.

## 2023-09-18 NOTE — PLAN OF CARE
Pt did not attend group when prompted or offered.      Problem: Depression  Goal: Attend and participate in unit activities, including therapeutic, recreational, and educational groups  Description: Interventions:  - Provide therapeutic and educational activities daily, encourage attendance and participation, and document same in the medical record   Outcome: Not Progressing

## 2023-09-18 NOTE — NURSING NOTE
Patient complained of anxiety and requested PRN Atarax for same. PRN Atarax given at [phone] with decrease in anxiety reported and effectiveness noted at this time. Will continue to monitor patient status.

## 2023-09-18 NOTE — PROGRESS NOTES
Progress Note - Behavioral Health   Samantha Núñez Peak 59 y.o. female MRN: [id number]  Unit/Bed#: Roxanne Little 378-62 Encounter: [id number]       Patient was visited on unit for continuing care; chart reviewed and discussed with multidisciplinary treatment team. On approach, the patient was calm and cooperative. She reported feeling better since slept well last night, and reported;y her anxiety level has been improving and only had "two panic attacks" with good response to Atarax. Denied any changes in appetite, and energy level. No problem initiating and maintaining sleep. Denied A/VH currently. Denied SI/HI, intent or plan upon direct inquiry at this time. Patient continues to be withdrawn with minimal interactions with staff and peers, reading the Bible in her room. No reports of aggression or self-injurious behavior on unit. Received Atarax 25 mg po PRN at 1219 yesterday for anxiety. Patient accepted all offered medications and reported feeling better. No adverse effects of medications noted or reported. Given the lack of clinical response and to avoid polypharmacy, the patient's regimen is being simplified. Cymbalta to be cross titrated to Lexapro, and Seroquel switched to Seroquel  mg nightly. Klonopin dose adjusted to 0.5/1 mg to be tapered off subsequently, and Lyrica increased from 50 mg daily to TID and then to 75 mg TID to control pain and anxiety, and Remeron discontinued. Doses to be adjusted as indicated. Maintained on Ingrezza 40 mg daily. PT/OT eval ordered and the patient was placed on fall and seizure precautions. Given the history and multiple medical conditions, the patient benefits from higher level of care (e.g NH placement) upon further stabilization.      Current Mental Status Evaluation:  Appearance and attitude: appeared as stated age, dressed in hospital attire, with good hygiene, poor dentition  Eye contact: good  Motor Function: within normal limits, No PMA/PMR  Gait/station: Not observed  Speech: normal for rate, rhythm, volume, latency, amount  Language: No overt abnormality  Mood/affect: less anxious / Affect was constricted but reactive, mood congruent  Thought Processes: linear, concrete  Thought content: denied suicidal ideations or homicidal ideations, no overt delusions elicited  Associations: concrete associations  Perceptual disturbances: denies Auditory/Visual/Tactile Hallucinations  Orientation: oriented to time, person, place and to the situational context  Cognitive Function: intact  Memory: recent and remote memory grossly intact  Intellect: average  Fund of knowledge: aware of current events, aware of past history and vocabulary average  Impulse control: good  Insight/judgment: fair/fair    Pain: reported having pain in her back  Pain scale: 7      Vital signs in last 24 hours:    Temp:  [96.8 °F (36 °C)-97.8 °F (36.6 °C)] 97 °F (36.1 °C)  HR:  [65-66] 65  Resp:  [15-16] 15  BP: (121-144)/(75-92) 144/92    Laboratory results: I have personally reviewed all pertinent laboratory/tests results    Results from the past 24 hours: No results found for this or any previous visit (from the past 24 hour(s)). Progress Toward Goals: gradual improvement    Assessment:  Principal Problem:    Panic disorder without agoraphobia  Active Problems:    Bipolar disorder (HCC)    Generalized anxiety disorder    Post traumatic stress disorder    Lumbar radiculopathy    GERD without esophagitis    Essential hypertension    Tardive dyskinesia    Medical clearance for psychiatric admission    Mixed hyperlipidemia    Anemia    Muscle spasm        Plan:  - f/u SLIM recs regarding the medical problems  - fall and seizure precaution  - f/u OT eval  - Cross titrate Cymbalta to Lexapro  - Continue medication titration and treatment plan; adjust medication to optimize treatment response and as clinically indicated.      Scheduled medications:  Current Facility-Administered Medications   Medication Dose Route Frequency Provider Last Rate   • amLODIPine  10 mg Oral Daily MERCY Cisneros     • aspirin  81 mg Oral Daily MERCY Cisneros     • atorvastatin  40 mg Oral Daily With [employer]MERCY     • clonazePAM  0.5 mg Oral Daily Murray Webb MD     • clonazePAM  1 mg Oral HS Murray Webb MD     • Diclofenac Sodium  2 g Topical 4x Daily PRN MERCY Cisneros     • [START ON 9/19/2023] DULoxetine  30 mg Oral Daily Murray Webb MD     • ergocalciferol  50,000 Units Oral Weekly MERCY Cisneros     • escitalopram  10 mg Oral Daily Murray Webb MD     • ferrous sulfate  325 mg Oral Every Other Day MERCY Cisneros     • haloperidol lactate  5 mg Intramuscular Q4H PRN Max 4/day MERCY Goldman     • hydrOXYzine HCL  25 mg Oral Q6H PRN Max 4/day MERCY Goldman     • ibuprofen  200 mg Oral Q6H PRN MERCY Goldman     • ibuprofen  400 mg Oral Q6H PRN MERCY Goldman     • ibuprofen  600 mg Oral Q8H PRN MERCY Goldman     • LORazepam  1 mg Intramuscular Q6H PRN Max 3/day MERCY Goldman     • LORazepam  0.5 mg Oral Q6H PRN Max 4/day MERCY Goldman     • LORazepam  1 mg Oral Q6H PRN Max 3/day MERCY Goldman     • melatonin  3 mg Oral HS Murray Webb MD     • methocarbamol  500 mg Oral Q6H PRN MERCY Cisneros     • pantoprazole  40 mg Oral Early Morning MERCY Cisneros     • polyethylene glycol  17 g Oral Daily PRN MERCY Goldman     • pregabalin  75 mg Oral TID Murray Webb MD     • QUEtiapine  150 mg Oral HS MERCY Goldman     • risperiDONE  0.25 mg Oral Q4H PRN Max 6/day MERCY Goldman     • risperiDONE  0.5 mg Oral Q4H PRN Max 3/day MERCY Goldman     • risperiDONE  1 mg Oral Q2H PRN Max 3/day MERCY Goldman     • senna-docusate sodium  1 tablet Oral Daily PRN MERCY Goldman     • traZODone  50 mg Oral HS PRN MERCY Goldman     • Valbenazine Tosylate  40 mg Oral Daily MERCY Marlow          PRN:  •  Diclofenac Sodium  •  haloperidol lactate  •  hydrOXYzine HCL  •  ibuprofen  •  ibuprofen  •  ibuprofen  •  LORazepam  •  LORazepam  •  LORazepam  •  methocarbamol  •  polyethylene glycol  •  risperiDONE  •  risperiDONE  •  risperiDONE  •  senna-docusate sodium  •  traZODone    - Observation: routine    - VS: as per unit protocol  - Diet: Regular diet  - Psychoeducation (benefits and potential risks) discussed, importance of compliance with the psychiatric treatment reiterated, and the patient verbalized understanding of the matter  - Encourage group attendance and milieu therapy    - The pt was educated and agreed to verbalize any suicidal thoughts, frustrations or concerns to the nursing staff, immediately. - Dispo: TBD       Next of Kin  Extended Emergency Contact Information  Primary Emergency Contact: [address] [facility]  Mobile Phone: [phone]  Relation: Son In-Law  Secondary Emergency Contact: [place]  Address: [address] [facility]  Mobile Phone: [phone]  Relation: Son      Counseling / Coordination of Care  Patient's progress discussed with staff in treatment team meeting. Medications, treatment progress and treatment plan reviewed with patient. Medication changes discussed with patient. Supportive therapy provided to patient. Cognitive techniques utilized during the session. Reassurance and supportive therapy provided. Reoriented to reality and reassured. Group attendance encouraged.      Alonzo Kahn MD  Attending [address] [place] [address]

## 2023-09-18 NOTE — SPEECH THERAPY NOTE
Speech Pathology Bedside Swallow Evaluation:                    SLP RECOMMENDATIONS:         Diet: Regular         Liquids: Thin liquids         Medications: as best tolerated                Summary:  Pt seen for bedside swallow evaluation. Pt is currently on a Regular/thin liquid diet. Pt was seen by ST services following CVA in 2020. At that time a Level 3 [facility] soft/thin liquid diet was recommended. Pt reports she has since been on a regular consistency diet at home. 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 Pt is currently on a Regular/thin liquid diet. [facility] was notable for intermittent tongue protrusion, lip smacking, eye blinking, and jerking movements consistent with tardive dyskinesia dx. Pt noted to be edentulous. Pt reports she is getting dentures made currently, but feels she can chew okay even with missing and avoids ordering coarse solids from the menu as a precaution. Pt endorses a good appetite. Pt trialed with thin liquids via single and consecutive straw sips with no overt s/s aspiration or apparent difficulty. Pt trialed with regular consistency solids (crackers) with slow, but functional mastication/manipulation of bolus with slightly reduced bite-strength r/t missing dentition. Pt reports she prefers to avoid harder foods from the menu vs having her diet modified. Pt demonstrated good understanding of general swallow precautions. Pt does endorse some occasional esophageal pressure after eating and reflux. Given h/o tortuous esophagus, recommend considering f/u with OP GI services. Recommend continuing current diet. ST services not indicated at this time. Please re-consult with any new concerns. Eval only, No f/u tx indicated.      Vitals:    09/17/23 1217 09/17/23 1530 09/17/23 2043 09/18/23 0735   BP: 131/76 143/78 121/75 144/92   BP Location: Right arm Right arm Right arm Left arm   Pulse: 97 66 65 65   Resp:  16 15 15   Temp:  (!) 96.8 °F (36 °C) 97.8 °F (36.6 °C) (!) 97 °F (36.1 °C)   TempSrc:  Temporal Temporal Temporal   SpO2:  99% 98% 98%   Weight:       Height:         Lab Results   Component Value Date    WBC 3.88 (L) 09/15/2023    HGB 13.5 09/15/2023    HCT 41.2 09/15/2023    MCV 92 09/15/2023     09/15/2023         Consider consult w/:  OP GI      Goal(s):  Pt will tolerate least restrictive diet w/out s/s aspiration or oral/pharyngeal difficulties. H&P/Admit info/ pertinent provider notes: (PMH noted above)  Linnette Farmer MD  Physician  Specialty:  Emergency Medicine  "39-year-old female, presents with anxiety. Patient states she is very anxious, has difficulty concentrating and sleeping. Patient states she has a lot of stress in her life involving her children which causes a lot of her anxiety. Has been taking multiple medications with no improvement, has had numerous ED visits with similar symptoms. Patient denies any drug or alcohol use. Denies any thoughts about harming herself or others."    Copied from admission 9/5/2020:  "Samantha Rodriguez is a 62 y.o. female who presents with knee pain.  She reports acute worsening of her knee over the last week and reports today that she was unable to weight bear secondary to pain.  She reports some mild associated swelling of the knee joint.  She reports today her knee almost gave out while walking on the stairs and had a near fall.  Denies any rash.  Denies fever and chills.  She has extensive psychiatry history including bipolar 2, anxiety, tardive dyskinesia, depression and follows with outpatient psychiatry and did have an inpatient psychiatry admission in the past and will need psychiatry evaluation prior to placement.  She denies recent illness.  She reports that she lives at home with her daughter and her 4 children."    Special Studies:  FINDINGS: EGD 9/7  • Tortuous esophagus.  There appeared to be a prominent bulge in the upper esophagus that was likely related to extrinsic pressure from the aortic knob. • Moderate erythematous mucosa in the body of the stomach and antrum; performed cold biopsy biopsied to rule out H. Pylori  • The duodenum appeared normal.    CXR 7/18/23: IMPRESSION:  No acute cardiopulmonary disease. Previous VBS:  None     Patient's goal: none stated     Did the pt report pain? No   If yes, was nursing notified/was it addressed?     Reason for consult:  R/o aspiration  Determine safest and least restrictive diet    Precautions:  Aspiration    Food allergies:  No Known Allergies     Current diet:  Regular/thin    Premorbid diet:  Regular/thin    O2 requirements:  RA   Voice/Speech:  WFL   Follows commands:  Intact    Cognitive status:  Alert and oriented             Thank you for this consult,    Sarah Beck, [address] [facility], [facility]

## 2023-09-18 NOTE — PLAN OF CARE
Problem: Anxiety  Goal: Anxiety is at manageable level  Description: Interventions:  - Assess and monitor patient's anxiety level. - Monitor for signs and symptoms (heart palpitations, chest pain, shortness of breath, headaches, nausea, feeling jumpy, restlessness, irritable, apprehensive). - Collaborate with interdisciplinary team and initiate plan and interventions as ordered. - [place] patient to unit/surroundings  - Explain treatment plan  - Encourage participation in care  - Encourage verbalization of concerns/fears  - Identify coping mechanisms  - Assist in developing anxiety-reducing skills  - Administer/offer alternative therapies  - Limit or eliminate stimulants  Outcome: Progressing     Problem: BEHAVIOR  Goal: Pt/Family maintain appropriate behavior and adhere to behavioral management agreement, if implemented  Description: INTERVENTIONS:  - Assess the family dynamic   - Encourage verbalization of thoughts and concerns in a socially appropriate manner  - Assess patient/family's coping skills and non-compliant behavior (including use of illegal substances). - Utilize positive, consistent limit setting strategies supporting safety of patient, staff and others  - Initiate consult with Case Management, Spiritual Care or other ancillary services as appropriate  - If a patient's/visitor's behavior jeopardizes the safety of the patient, staff, or others, refer to organization procedure.    - Notify Security of behavior or suspected illegal substances which indicate the need for search of the patient and/or belongings  - Encourage participation in the decision making process about a behavioral management agreement; implement if patient meets criteria  Outcome: Progressing     Problem: SLEEP DISTURBANCE  Goal: Will exhibit normal sleeping pattern  Description: Interventions:  -  Assess the patients sleep pattern, noting recent changes  - Administer medication as ordered  - Decrease environmental stimuli, including noise, as appropriate during the night  - Encourage the patient to actively participate in unit groups and or exercise during the day to enhance ability to achieve adequate sleep at night  - Assess the patient, in the morning, encouraging a description of sleep experience  Outcome: Progressing

## 2023-09-19 PROCEDURE — 99232 SBSQ HOSP IP/OBS MODERATE 35: CPT | Performed by: STUDENT IN AN ORGANIZED HEALTH CARE EDUCATION/TRAINING PROGRAM

## 2023-09-19 RX ORDER — BISACODYL 5 MG/1
5 TABLET, DELAYED RELEASE ORAL DAILY PRN
Status: DISCONTINUED | OUTPATIENT
Start: 2023-09-19 | End: 2023-09-19

## 2023-09-19 RX ORDER — BISACODYL 5 MG/1
5 TABLET, DELAYED RELEASE ORAL DAILY PRN
Status: DISCONTINUED | OUTPATIENT
Start: 2023-09-19 | End: 2023-09-25 | Stop reason: HOSPADM

## 2023-09-19 RX ADMIN — PREGABALIN 75 MG: 75 CAPSULE ORAL at 08:09

## 2023-09-19 RX ADMIN — CLONAZEPAM 0.5 MG: 0.5 TABLET ORAL at 08:09

## 2023-09-19 RX ADMIN — ATORVASTATIN CALCIUM 40 MG: 40 TABLET, FILM COATED ORAL at 16:26

## 2023-09-19 RX ADMIN — VALBENAZINE 40 MG: 40 CAPSULE ORAL at 08:10

## 2023-09-19 RX ADMIN — FERROUS SULFATE TAB 325 MG (65 MG ELEMENTAL FE) 325 MG: 325 (65 FE) TAB at 08:09

## 2023-09-19 RX ADMIN — HYDROXYZINE HYDROCHLORIDE 25 MG: 25 TABLET ORAL at 15:10

## 2023-09-19 RX ADMIN — PANTOPRAZOLE SODIUM 40 MG: 40 TABLET, DELAYED RELEASE ORAL at 05:22

## 2023-09-19 RX ADMIN — BISACODYL 5 MG: 5 TABLET, COATED ORAL at 10:31

## 2023-09-19 RX ADMIN — QUETIAPINE FUMARATE 150 MG: 50 TABLET, EXTENDED RELEASE ORAL at 21:08

## 2023-09-19 RX ADMIN — PREGABALIN 75 MG: 75 CAPSULE ORAL at 16:26

## 2023-09-19 RX ADMIN — ESCITALOPRAM OXALATE 10 MG: 10 TABLET ORAL at 08:09

## 2023-09-19 RX ADMIN — MELATONIN TAB 3 MG 3 MG: 3 TAB at 21:09

## 2023-09-19 RX ADMIN — ASPIRIN 81 MG: 81 TABLET, COATED ORAL at 08:09

## 2023-09-19 RX ADMIN — PREGABALIN 75 MG: 75 CAPSULE ORAL at 21:09

## 2023-09-19 RX ADMIN — DULOXETINE HYDROCHLORIDE 30 MG: 30 CAPSULE, DELAYED RELEASE ORAL at 08:09

## 2023-09-19 RX ADMIN — AMLODIPINE BESYLATE 10 MG: 10 TABLET ORAL at 08:09

## 2023-09-19 RX ADMIN — Medication 2 G: at 19:19

## 2023-09-19 RX ADMIN — CLONAZEPAM 1 MG: 1 TABLET ORAL at 21:09

## 2023-09-19 NOTE — PROGRESS NOTES
Progress Note - Behavioral Health   Samantha QUINONEZ Leandra Varela 59 y.o. female MRN: [id number]  Unit/Bed#: Luis Eduardo Knox 828-01 Encounter: [id number]       Patient was visited on unit for continuing care; chart reviewed and discussed with multidisciplinary treatment team. On approach, the patient was calm and cooperative. The patient continues to feeling depressed and anxious. However she reported that her pain and anxiety has been more manageable with the increase of Lyrica. The patient reports that she is not interested in referral for nursing home and would like to go back and live with her daughter and son-in-law who reportedly have been very supportive. Denied any changes in appetite, and energy level. No problem initiating and maintaining sleep. Denied A/VH currently. Denied SI/HI, intent or plan upon direct inquiry at this time. Patient continues to be intermittently visible in the milieu and interacts with select staff and peers. No reports of aggression or self-injurious behavior on unit. No PRN medications used in the past 24 hours except Atarax 25 mg PRN at 1609 yesterday for anxiety. Patient accepted all offered medications and reported feeling better. No adverse effects of medications noted or reported. Given the lack of clinical response and to avoid polypharmacy, the patient's regimen is being simplified. Cymbalta was cross titrated to Lexapro, and Seroquel switched to Seroquel  mg nightly. Klonopin dose adjusted to 0.5/1 mg to be tapered off subsequently, and Lyrica increased from 50 mg daily to TID and then to 75 mg TID to control pain and anxiety, and Remeron discontinued. Doses to be adjusted as indicated. Maintained on Ingrezza 40 mg daily. PT/OT eval ordered and the patient was placed on fall and seizure precautions. Lexapro is being increased to 15 mg daily tomorrow; doses to be adjusted as indicated.      Current Mental Status Evaluation:  Appearance and attitude: appeared as stated age, dressed in hospital attire, with good hygiene  Eye contact: good  Motor Function: No PMA/PMR, tonhue movements due to TD  Gait/station: Not observed  Speech: normal for rate, rhythm, volume, and latency with decreased amount  Language: No overt abnormality  Mood/affect: less anxious / Affect was constricted but reactive, mood congruent  Thought Processes: concrete  Thought content: denied suicidal ideations or homicidal ideations, no overt delusions elicited, negative thinking, ruminations  Associations: concrete associations  Perceptual disturbances: denies Auditory/Visual/Tactile Hallucinations  Orientation: oriented to time, person, place and to the situational context  Cognitive Function: intact  Memory: recent and remote memory grossly intact  Intellect: average  Fund of knowledge: aware of current events, aware of past history and vocabulary average  Impulse control: good  Insight/judgment: fair/fair      Vital signs in last 24 hours:    Temp:  [96.8 °F (36 °C)-98.6 °F (37 °C)] 97.8 °F (36.6 °C)  HR:  [64-74] 64  Resp:  [16] 16  BP: (117-145)/(75-87) 117/75    Laboratory results: I have personally reviewed all pertinent laboratory/tests results    Results from the past 24 hours: No results found for this or any previous visit (from the past 24 hour(s)). Progress Toward Goals: gradual improvement    Assessment:  Principal Problem:    Panic disorder without agoraphobia  Active Problems:    Bipolar disorder (HCC)    Generalized anxiety disorder    Post traumatic stress disorder    Lumbar radiculopathy    GERD without esophagitis    Essential hypertension    Tardive dyskinesia    Medical clearance for psychiatric admission    Mixed hyperlipidemia    Anemia    Muscle spasm        Plan:  - fall and seizure precaution  - f/u SLIM recs regarding the medical problems  - Continue medication titration and treatment plan; adjust medication to optimize treatment response and as clinically indicated.      Scheduled medications:  Current Facility-Administered Medications   Medication Dose Route Frequency Provider Last Rate   • amLODIPine  10 mg Oral Daily MERCY Cisneros     • aspirin  81 mg Oral Daily MERCY Cisneros     • atorvastatin  40 mg Oral Daily With [employer], CRNP     • bisacodyl  5 mg Oral Daily PRN Swathi Sun MD     • clonazePAM  0.5 mg Oral Daily Swathi Sun MD     • clonazePAM  1 mg Oral HS Swathi Sun MD     • Diclofenac Sodium  2 g Topical 4x Daily PRN MERCY Cisneros     • ergocalciferol  50,000 Units Oral Weekly MERCY Cisneros     • [START ON 9/20/2023] escitalopram  15 mg Oral Daily Swathi Sun MD     • ferrous sulfate  325 mg Oral Every Other Day MERCY Cisneros     • haloperidol lactate  5 mg Intramuscular Q4H PRN Max 4/day MERCY Saucedo     • hydrOXYzine HCL  25 mg Oral Q6H PRN Max 4/day MERCY Saucedo     • ibuprofen  200 mg Oral Q6H PRN MERCY Saucedo     • ibuprofen  400 mg Oral Q6H PRN MERCY Saucedo     • ibuprofen  600 mg Oral Q8H PRN MERCY Saucedo     • LORazepam  1 mg Intramuscular Q6H PRN Max 3/day MERCY Saucedo     • LORazepam  0.5 mg Oral Q6H PRN Max 4/day MERCY Saucedo     • LORazepam  1 mg Oral Q6H PRN Max 3/day MERCY Saucedo     • melatonin  3 mg Oral HS Swathi Sun MD     • methocarbamol  500 mg Oral Q6H PRN MERCY Cisneros     • pantoprazole  40 mg Oral Early Morning MERCY Cisneros     • polyethylene glycol  17 g Oral Daily PRN MERCY Saucedo     • pregabalin  75 mg Oral TID Swathi Sun MD     • QUEtiapine  150 mg Oral HS MERCY Saucedo     • risperiDONE  0.25 mg Oral Q4H PRN Max 6/day MERCY Saucedo     • risperiDONE  0.5 mg Oral Q4H PRN Max 3/day MERCY Saucedo     • risperiDONE  1 mg Oral Q2H PRN Max 3/day MERCY Saucedo     • senna-docusate sodium  1 tablet Oral Daily PRN Patti Noonan Gilford Bars     • traZODone  50 mg Oral HS PRN MERCY Brannon     • Valbenazine Tosylate  40 mg Oral Daily MERCY Brannon          PRN:  •  bisacodyl  •  Diclofenac Sodium  •  haloperidol lactate  •  hydrOXYzine HCL  •  ibuprofen  •  ibuprofen  •  ibuprofen  •  LORazepam  •  LORazepam  •  LORazepam  •  methocarbamol  •  polyethylene glycol  •  risperiDONE  •  risperiDONE  •  risperiDONE  •  senna-docusate sodium  •  traZODone    - Observation: routine    - VS: as per unit protocol  - Diet: Regular diet  - Psychoeducation (benefits and potential risks) discussed, importance of compliance with the psychiatric treatment reiterated, and the patient verbalized understanding of the matter  - Encourage group attendance and milieu therapy    - The pt was educated and agreed to verbalize any suicidal thoughts, frustrations or concerns to the nursing staff, immediately. - Dispo: TBD       Next of Kin  · Extended Emergency Contact Information  · Primary Emergency Contact: Annel Villanueva  · Mobile Phone: [phone]  · Relation: Son In-Law  · Secondary Emergency Contact: Melvin Villa  · Address: [facility] [address]  ·          [facility], [address] [place] [address]  · Mobile Phone: [phone]  · Relation: Son      Counseling / Coordination of Care  Patient's progress discussed with staff in treatment team meeting. Medications, treatment progress and treatment plan reviewed with patient. Supportive therapy provided to patient. Cognitive techniques utilized during the session. Reassurance and supportive therapy provided. Encouraged participation in milieu and group therapy on the unit.      Bianca Avery MD  Attending [address]

## 2023-09-19 NOTE — NURSING NOTE
Patient endorses depression and anxiety. Denies SI/HI/AH/VH. Pleasant and cooperative with staff. Medication compliant. Patient is visible in the dayroom with peers. Patient encouraged to make needs known. Safety checks ongoing.

## 2023-09-19 NOTE — NURSING NOTE
Patient reporting severe anxiety, describes this as palpitations and tension. Allen score 13.  PRN atarax 25mg given at 1510    Patient reports PRN atarax was mildly effective during reassessment

## 2023-09-19 NOTE — CASE MANAGEMENT
Spoke to the patient's daughter, Teresa Rizzo ([phone]), who states they are moving into another place next week and are expecting the patient to come back to live with them. They suggested that the patient maybe get involved in a day program. [facility] referral sent for their day program.     Spoke to the patient and she asked to have some medication scheduled for constipation and then to have her klonopin increased. Spoke to the psychiatrist and he advised the klonopin was recently increased and he will add meds for constipation. Hannah Borja application was sent today via e-mail to [email or web address] and scanned into the chart.      ICM referral sent to [facility] and [facility] CHEMICAL DEPENDENCY [facility]

## 2023-09-19 NOTE — PROGRESS NOTES
09/19/23 0810   Team Meeting   Meeting Type Daily Rounds   Initial Conference Date 09/19/23   Next Conference Date 09/20/23   Team Members Present   Team Members Present Physician;Nurse;;Occupational Therapist;   Physician Team Member Dr Anayeli Kulkarni, Dr Norma Bronson Management Team Member [address] [place] [address] [employer] Team Member Diogo   OT Team Member Jack Vo   Patient/Family Present   Patient Present No   Patient's Family Present No     Reports anxiety, increased lyrica, PT/OT dionne in[place]rine application to be submitted.  Will review for skilled need for SNF

## 2023-09-19 NOTE — PLAN OF CARE
Problem: Potential for Falls  Goal: Patient will remain free of falls  Description: INTERVENTIONS:  - Educate patient/family on patient safety including physical limitations  - Instruct patient to call for assistance with activity   - Consult OT/PT to assist with strengthening/mobility   - Keep Call bell within reach  - Keep bed low and locked with side rails adjusted as appropriate  - Keep care items and personal belongings within reach  - Initiate and maintain comfort rounds  - Make Fall Risk Sign visible to staff  - Offer Toileting every  Hours, in advance of need  - Initiate/Maintain alarm  - Obtain necessary fall risk management equipment:  - Apply yellow socks and bracelet for high fall risk patients  - Consider moving patient to room near nurses station  Outcome: Progressing     Problem: Depression  Goal: Treatment Goal: Demonstrate behavioral control of depressive symptoms, verbalize feelings of improved mood/affect, and adopt new coping skills prior to discharge  Outcome: Progressing  Goal: Verbalize thoughts and feelings  Description: Interventions:  - Assess and re-assess patient's level of risk   - Engage patient in 1:1 interactions, daily, for a minimum of 15 minutes   - Encourage patient to express feelings, fears, frustrations, hopes   Outcome: Progressing  Goal: Refrain from harming self  Description: Interventions:  - Monitor patient closely, per order   - Supervise medication ingestion, monitor effects and side effects   Outcome: Progressing  Goal: Refrain from isolation  Description: Interventions:  - Develop a trusting relationship   - Encourage socialization   Outcome: Progressing  Goal: Refrain from self-neglect  Outcome: Progressing  Goal: Attend and participate in unit activities, including therapeutic, recreational, and educational groups  Description: Interventions:  - Provide therapeutic and educational activities daily, encourage attendance and participation, and document same in the medical record   Outcome: Progressing  Goal: Complete daily ADLs, including personal hygiene independently, as able  Description: Interventions:  - Observe, teach, and assist patient with ADLS  -  Monitor and promote a balance of rest/activity, with adequate nutrition and elimination   Outcome: Progressing     Problem: Anxiety  Goal: Anxiety is at manageable level  Description: Interventions:  - Assess and monitor patient's anxiety level. - Monitor for signs and symptoms (heart palpitations, chest pain, shortness of breath, headaches, nausea, feeling jumpy, restlessness, irritable, apprehensive). - Collaborate with interdisciplinary team and initiate plan and interventions as ordered. - [place] patient to unit/surroundings  - Explain treatment plan  - Encourage participation in care  - Encourage verbalization of concerns/fears  - Identify coping mechanisms  - Assist in developing anxiety-reducing skills  - Administer/offer alternative therapies  - Limit or eliminate stimulants  Outcome: Progressing     Problem: BEHAVIOR  Goal: Pt/Family maintain appropriate behavior and adhere to behavioral management agreement, if implemented  Description: INTERVENTIONS:  - Assess the family dynamic   - Encourage verbalization of thoughts and concerns in a socially appropriate manner  - Assess patient/family's coping skills and non-compliant behavior (including use of illegal substances). - Utilize positive, consistent limit setting strategies supporting safety of patient, staff and others  - Initiate consult with Case Management, Spiritual Care or other ancillary services as appropriate  - If a patient's/visitor's behavior jeopardizes the safety of the patient, staff, or others, refer to organization procedure.    - Notify Security of behavior or suspected illegal substances which indicate the need for search of the patient and/or belongings  - Encourage participation in the decision making process about a behavioral management agreement; implement if patient meets criteria  Outcome: Progressing     Problem: SLEEP DISTURBANCE  Goal: Will exhibit normal sleeping pattern  Description: Interventions:  -  Assess the patients sleep pattern, noting recent changes  - Administer medication as ordered  - Decrease environmental stimuli, including noise, as appropriate during the night  - Encourage the patient to actively participate in unit groups and or exercise during the day to enhance ability to achieve adequate sleep at night  - Assess the patient, in the morning, encouraging a description of sleep experience  Outcome: Progressing     Problem: Prexisting or High Potential for Compromised Skin Integrity  Goal: Skin integrity is maintained or improved  Description: INTERVENTIONS:  - Identify patients at risk for skin breakdown  - Assess and monitor skin integrity  - Assess and monitor nutrition and hydration status  - Monitor labs   - Assess for incontinence   - Turn and reposition patient  - Assist with mobility/ambulation  - Relieve pressure over bony prominences  - Avoid friction and shearing  - Provide appropriate hygiene as needed including keeping skin clean and dry  - Evaluate need for skin moisturizer/barrier cream  - Collaborate with interdisciplinary team   - Patient/family teaching  - Consider wound care consult   Outcome: Progressing     Problem: DISCHARGE PLANNING - CARE MANAGEMENT  Goal: Discharge to post-acute care or home with appropriate resources  Description: INTERVENTIONS:  - Conduct assessment to determine patient/family and health care team treatment goals, and need for post-acute services based on payer coverage, community resources, and patient preferences, and barriers to discharge  - Address psychosocial, clinical, and financial barriers to discharge as identified in assessment in conjunction with the patient/family and health care team  - Arrange appropriate level of post-acute services according to patient’s needs and preference and payer coverage in collaboration with the physician and health care team  - Communicate with and update the patient/family, physician, and health care team regarding progress on the discharge plan  - Arrange appropriate transportation to post-acute venues  Outcome: Progressing

## 2023-09-19 NOTE — NURSING NOTE
Pt is pleasant on approach. She currently reports feeling "okay" today. Pt is med/meal compliant. Social with peers in the milieu. Pt currently denying any unmet needs.

## 2023-09-19 NOTE — PLAN OF CARE
Problem: Potential for Falls  Goal: Patient will remain free of falls  Description: INTERVENTIONS:  - Educate patient/family on patient safety including physical limitations  - Instruct patient to call for assistance with activity   - Consult OT/PT to assist with strengthening/mobility   - Keep Call bell within reach  - Keep bed low and locked with side rails adjusted as appropriate  - Keep care items and personal belongings within reach  - Initiate and maintain comfort rounds  - Make Fall Risk Sign visible to staff  - Apply yellow socks and bracelet for high fall risk patients  - Consider moving patient to room near nurses station  Outcome: Progressing     Problem: Depression  Goal: Treatment Goal: Demonstrate behavioral control of depressive symptoms, verbalize feelings of improved mood/affect, and adopt new coping skills prior to discharge  Outcome: Progressing  Goal: Verbalize thoughts and feelings  Description: Interventions:  - Assess and re-assess patient's level of risk   - Engage patient in 1:1 interactions, daily, for a minimum of 15 minutes   - Encourage patient to express feelings, fears, frustrations, hopes   Outcome: Progressing  Goal: Refrain from harming self  Description: Interventions:  - Monitor patient closely, per order   - Supervise medication ingestion, monitor effects and side effects   Outcome: Progressing  Goal: Refrain from isolation  Description: Interventions:  - Develop a trusting relationship   - Encourage socialization   Outcome: Progressing  Goal: Refrain from self-neglect  Outcome: Progressing  Goal: Complete daily ADLs, including personal hygiene independently, as able  Description: Interventions:  - Observe, teach, and assist patient with ADLS  -  Monitor and promote a balance of rest/activity, with adequate nutrition and elimination   Outcome: Progressing     Problem: Anxiety  Goal: Anxiety is at manageable level  Description: Interventions:  - Assess and monitor patient's anxiety level. - Monitor for signs and symptoms (heart palpitations, chest pain, shortness of breath, headaches, nausea, feeling jumpy, restlessness, irritable, apprehensive). - Collaborate with interdisciplinary team and initiate plan and interventions as ordered. - [place] patient to unit/surroundings  - Explain treatment plan  - Encourage participation in care  - Encourage verbalization of concerns/fears  - Identify coping mechanisms  - Assist in developing anxiety-reducing skills  - Administer/offer alternative therapies  - Limit or eliminate stimulants  Outcome: Progressing     Problem: BEHAVIOR  Goal: Pt/Family maintain appropriate behavior and adhere to behavioral management agreement, if implemented  Description: INTERVENTIONS:  - Assess the family dynamic   - Encourage verbalization of thoughts and concerns in a socially appropriate manner  - Assess patient/family's coping skills and non-compliant behavior (including use of illegal substances). - Utilize positive, consistent limit setting strategies supporting safety of patient, staff and others  - Initiate consult with Case Management, Spiritual Care or other ancillary services as appropriate  - If a patient's/visitor's behavior jeopardizes the safety of the patient, staff, or others, refer to organization procedure.    - Notify Security of behavior or suspected illegal substances which indicate the need for search of the patient and/or belongings  - Encourage participation in the decision making process about a behavioral management agreement; implement if patient meets criteria  Outcome: Progressing     Problem: SLEEP DISTURBANCE  Goal: Will exhibit normal sleeping pattern  Description: Interventions:  -  Assess the patients sleep pattern, noting recent changes  - Administer medication as ordered  - Decrease environmental stimuli, including noise, as appropriate during the night  - Encourage the patient to actively participate in unit groups and or exercise during the day to enhance ability to achieve adequate sleep at night  - Assess the patient, in the morning, encouraging a description of sleep experience  Outcome: Progressing     Problem: Prexisting or High Potential for Compromised Skin Integrity  Goal: Skin integrity is maintained or improved  Description: INTERVENTIONS:  - Identify patients at risk for skin breakdown  - Assess and monitor skin integrity  - Assess and monitor nutrition and hydration status  - Monitor labs   - Assess for incontinence   - Turn and reposition patient  - Assist with mobility/ambulation  - Relieve pressure over bony prominences  - Avoid friction and shearing  - Provide appropriate hygiene as needed including keeping skin clean and dry  - Evaluate need for skin moisturizer/barrier cream  - Collaborate with interdisciplinary team   - Patient/family teaching  - Consider wound care consult   Outcome: Progressing

## 2023-09-20 PROCEDURE — 99232 SBSQ HOSP IP/OBS MODERATE 35: CPT | Performed by: STUDENT IN AN ORGANIZED HEALTH CARE EDUCATION/TRAINING PROGRAM

## 2023-09-20 RX ORDER — PREGABALIN 100 MG/1
100 CAPSULE ORAL 3 TIMES DAILY
Status: DISCONTINUED | OUTPATIENT
Start: 2023-09-20 | End: 2023-09-25 | Stop reason: HOSPADM

## 2023-09-20 RX ADMIN — ASPIRIN 81 MG: 81 TABLET, COATED ORAL at 08:05

## 2023-09-20 RX ADMIN — ATORVASTATIN CALCIUM 40 MG: 40 TABLET, FILM COATED ORAL at 16:46

## 2023-09-20 RX ADMIN — CLONAZEPAM 0.5 MG: 0.5 TABLET ORAL at 08:05

## 2023-09-20 RX ADMIN — PREGABALIN 100 MG: 100 CAPSULE ORAL at 16:46

## 2023-09-20 RX ADMIN — QUETIAPINE FUMARATE 150 MG: 50 TABLET, EXTENDED RELEASE ORAL at 21:18

## 2023-09-20 RX ADMIN — PANTOPRAZOLE SODIUM 40 MG: 40 TABLET, DELAYED RELEASE ORAL at 05:53

## 2023-09-20 RX ADMIN — MELATONIN TAB 3 MG 3 MG: 3 TAB at 21:18

## 2023-09-20 RX ADMIN — ESCITALOPRAM 15 MG: 5 TABLET, FILM COATED ORAL at 08:05

## 2023-09-20 RX ADMIN — VALBENAZINE 40 MG: 40 CAPSULE ORAL at 08:05

## 2023-09-20 RX ADMIN — PREGABALIN 100 MG: 100 CAPSULE ORAL at 21:18

## 2023-09-20 RX ADMIN — AMLODIPINE BESYLATE 10 MG: 10 TABLET ORAL at 08:05

## 2023-09-20 RX ADMIN — CLONAZEPAM 1 MG: 1 TABLET ORAL at 21:18

## 2023-09-20 RX ADMIN — PREGABALIN 75 MG: 75 CAPSULE ORAL at 08:05

## 2023-09-20 NOTE — PLAN OF CARE
Problem: Potential for Falls  Goal: Patient will remain free of falls  Description: INTERVENTIONS:  - Educate patient/family on patient safety including physical limitations  - Instruct patient to call for assistance with activity   - Consult OT/PT to assist with strengthening/mobility   - Keep Call bell within reach  - Keep bed low and locked with side rails adjusted as appropriate  - Keep care items and personal belongings within reach  - Initiate and maintain comfort rounds  - Make Fall Risk Sign visible to staff  - Apply yellow socks and bracelet for high fall risk patients  - Consider moving patient to room near nurses station  Outcome: Progressing     Problem: Depression  Goal: Treatment Goal: Demonstrate behavioral control of depressive symptoms, verbalize feelings of improved mood/affect, and adopt new coping skills prior to discharge  Outcome: Progressing  Goal: Verbalize thoughts and feelings  Description: Interventions:  - Assess and re-assess patient's level of risk   - Engage patient in 1:1 interactions, daily, for a minimum of 15 minutes   - Encourage patient to express feelings, fears, frustrations, hopes   Outcome: Progressing  Goal: Refrain from harming self  Description: Interventions:  - Monitor patient closely, per order   - Supervise medication ingestion, monitor effects and side effects   Outcome: Progressing  Goal: Refrain from isolation  Description: Interventions:  - Develop a trusting relationship   - Encourage socialization   Outcome: Progressing  Goal: Refrain from self-neglect  Outcome: Progressing  Goal: Complete daily ADLs, including personal hygiene independently, as able  Description: Interventions:  - Observe, teach, and assist patient with ADLS  -  Monitor and promote a balance of rest/activity, with adequate nutrition and elimination   Outcome: Progressing     Problem: Anxiety  Goal: Anxiety is at manageable level  Description: Interventions:  - Assess and monitor patient's anxiety level. - Monitor for signs and symptoms (heart palpitations, chest pain, shortness of breath, headaches, nausea, feeling jumpy, restlessness, irritable, apprehensive). - Collaborate with interdisciplinary team and initiate plan and interventions as ordered. - [place] patient to unit/surroundings  - Explain treatment plan  - Encourage participation in care  - Encourage verbalization of concerns/fears  - Identify coping mechanisms  - Assist in developing anxiety-reducing skills  - Administer/offer alternative therapies  - Limit or eliminate stimulants  Outcome: Progressing     Problem: BEHAVIOR  Goal: Pt/Family maintain appropriate behavior and adhere to behavioral management agreement, if implemented  Description: INTERVENTIONS:  - Assess the family dynamic   - Encourage verbalization of thoughts and concerns in a socially appropriate manner  - Assess patient/family's coping skills and non-compliant behavior (including use of illegal substances). - Utilize positive, consistent limit setting strategies supporting safety of patient, staff and others  - Initiate consult with Case Management, Spiritual Care or other ancillary services as appropriate  - If a patient's/visitor's behavior jeopardizes the safety of the patient, staff, or others, refer to organization procedure.    - Notify Security of behavior or suspected illegal substances which indicate the need for search of the patient and/or belongings  - Encourage participation in the decision making process about a behavioral management agreement; implement if patient meets criteria  Outcome: Progressing     Problem: SLEEP DISTURBANCE  Goal: Will exhibit normal sleeping pattern  Description: Interventions:  -  Assess the patients sleep pattern, noting recent changes  - Administer medication as ordered  - Decrease environmental stimuli, including noise, as appropriate during the night  - Encourage the patient to actively participate in unit groups and or exercise during the day to enhance ability to achieve adequate sleep at night  - Assess the patient, in the morning, encouraging a description of sleep experience  Outcome: Progressing     Problem: Prexisting or High Potential for Compromised Skin Integrity  Goal: Skin integrity is maintained or improved  Description: INTERVENTIONS:  - Identify patients at risk for skin breakdown  - Assess and monitor skin integrity  - Assess and monitor nutrition and hydration status  - Monitor labs   - Assess for incontinence   - Turn and reposition patient  - Assist with mobility/ambulation  - Relieve pressure over bony prominences  - Avoid friction and shearing  - Provide appropriate hygiene as needed including keeping skin clean and dry  - Evaluate need for skin moisturizer/barrier cream  - Collaborate with interdisciplinary team   - Patient/family teaching  - Consider wound care consult   Outcome: Progressing

## 2023-09-20 NOTE — PLAN OF CARE
Pt attends groups and visible.     Problem: Depression  Goal: Attend and participate in unit activities, including therapeutic, recreational, and educational groups  Description: Interventions:  - Provide therapeutic and educational activities daily, encourage attendance and participation, and document same in the medical record   Outcome: Progressing

## 2023-09-20 NOTE — PROGRESS NOTES
Progress Note - Behavioral Health   Samantha QUINONEZ Shelly Leonard 59 y.o. female MRN: [id number]  Unit/Bed#: [facility] 158-15 Encounter: [id number]       Patient was visited on unit for continuing care; chart reviewed and discussed with multidisciplinary treatment team. On approach, the patient was calm and cooperative. Continues to be preoccupied with "panic attacks" with anticipatory anxiety. She reported feeling depressed about "life in general" and being anxious about having panic attacks despite not having any full blown panic attacks over past 48 hours. She noted that she feels anxious about moving to the new house with her daughter, but at the same time, would like to move with them and does not want to be placed in a NH or other type of facility. She was preoccupied with increasing her meds sander Klonopin. Psychoeducation about meds including benzodiazepines provided and the risks including risk of addiction and increased fall risk discussed with the patient. Denied any changes in appetite, and energy level. No problem initiating and maintaining sleep. Denied A/VH currently. Denied SI/HI, intent or plan upon direct inquiry at this time. Patient continues to be intermittently visible in the milieu and interacts with select staff and peers. No reports of aggression or self-injurious behavior on unit. No PRN medications used in the past 24 hours except Atarax 25 mg PRN at 1510 yesterday. Patient accepted all offered medications and reported feeling better. No adverse effects of medications noted or reported. Given the lack of clinical response and to avoid polypharmacy, the patient's regimen is being simplified. Cymbalta was cross titrated to Lexapro, and Seroquel switched to Seroquel  mg nightly. Klonopin dose adjusted to 0.5/1 mg to be tapered off subsequently, and Lyrica increased from 50 mg daily to [facility] then gradually to 100 mg TID pm 9/20/23 to control pain and anxiety, and Remeron discontinued.  Doses to be adjusted as indicated. Maintained on Ingrezza 40 mg daily. PT/OT eval ordered and the patient was placed on fall and seizure precautions. Lexapro was increased to 15 mg daily today; doses to be adjusted as indicated. Current Mental Status Evaluation:  Appearance and attitude: appeared as stated age, dressed in hospital attire, with good hygiene, Using the walker  Eye contact: good  Motor Function: No PMA/PMR, tongue movements due to TD  Gait/station:  needs assistive device  Speech: normal for rate, rhythm, volume, latency, amount  Language: No overt abnormality  Mood/affect: anxious, less depressed / Affect was constricted but reactive, mood congruent  Thought Processes: ruminating  Thought content: denies suicidal ideation or homicidal ideation; no delusions or first rank symptoms, denied suicidal ideations or homicidal ideations, no overt delusions elicited, negative thinking, ruminating thoughts  Associations: concrete associations, perseverative  Perceptual disturbances: denies Auditory/Visual/Tactile Hallucinations  Orientation: oriented to time, person, place and to the situational context  Cognitive Function: intact  Memory: recent and remote memory grossly intact  Intellect: average  Fund of knowledge: aware of current events, aware of past history and vocabulary average  Impulse control: good  Insight/judgment: fair/fair    Pain: reported having pain in her back  Pain scale: 5      Vital signs in last 24 hours:    Temp:  [97.2 °F (36.2 °C)-97.9 °F (36.6 °C)] 97.2 °F (36.2 °C)  HR:  [62-78] 62  Resp:  [16] 16  BP: (119-134)/(67-91) 124/67    Laboratory results: I have personally reviewed all pertinent laboratory/tests results    Results from the past 24 hours: No results found for this or any previous visit (from the past 24 hour(s)).     Progress Toward Goals: making slow improvement    Assessment:  Principal Problem:    Panic disorder without agoraphobia  Active Problems:    Bipolar disorder ([address]) Generalized anxiety disorder    Post traumatic stress disorder    Lumbar radiculopathy    GERD without esophagitis    Essential hypertension    Tardive dyskinesia    Medical clearance for psychiatric admission    Mixed hyperlipidemia    Anemia    Muscle spasm        Plan:  - f/u [facility] recs regarding the medical problems  - fall and seizure precaution  - Continue medication titration and treatment plan; adjust medication to optimize treatment response and as clinically indicated.      Scheduled medications:  Current Facility-Administered Medications   Medication Dose Route Frequency Provider Last Rate   • amLODIPine  10 mg Oral Daily MERCY Cisneros     • aspirin  81 mg Oral Daily MERCY Cisneros     • atorvastatin  40 mg Oral Daily With [employer]     • bisacodyl  5 mg Oral Daily PRN Bianca Avery MD     • clonazePAM  0.5 mg Oral Daily Bianca Avery MD     • clonazePAM  1 mg Oral HS Bianca Avery MD     • Diclofenac Sodium  2 g Topical 4x Daily PRN MERCY Cisneros     • ergocalciferol  50,000 Units Oral Weekly MERCY Cisneros     • escitalopram  15 mg Oral Daily Bianca Avery MD     • ferrous sulfate  325 mg Oral Every Other Day MERCY Cisneros     • haloperidol lactate  5 mg Intramuscular Q4H PRN Max 4/day MERCY Brannon     • hydrOXYzine HCL  25 mg Oral Q6H PRN Max 4/day MERCY Brannon     • ibuprofen  200 mg Oral Q6H PRN MERCY Brannon     • ibuprofen  400 mg Oral Q6H PRN MERCY Brannon     • ibuprofen  600 mg Oral Q8H PRN MERYC Brannon     • LORazepam  1 mg Intramuscular Q6H PRN Max 3/day MERCY Brannon     • LORazepam  0.5 mg Oral Q6H PRN Max 4/day MERCY Brannon     • LORazepam  1 mg Oral Q6H PRN Max 3/day MERCY Brannon     • melatonin  3 mg Oral HS Bianca Avery MD     • methocarbamol  500 mg Oral Q6H PRN MERCY Cisneros     • pantoprazole  40 mg Oral Early Morning MERCY Cisneros     • polyethylene glycol  17 g Oral Daily PRN MERCY Saucedo     • pregabalin  100 mg Oral TID Swathi Sun MD     • QUEtiapine  150 mg Oral HS MERCY Saucedo     • risperiDONE  0.25 mg Oral Q4H PRN Max 6/day MERCY Saucedo     • risperiDONE  0.5 mg Oral Q4H PRN Max 3/day MERCY Saucedo     • risperiDONE  1 mg Oral Q2H PRN Max 3/day MERCY Saucedo     • senna-docusate sodium  1 tablet Oral Daily PRN MERCY Saucedo     • traZODone  50 mg Oral HS PRN MERCY Saucedo     • Valbenazine Tosylate  40 mg Oral Daily MERCY Saucedo          PRN:  •  bisacodyl  •  Diclofenac Sodium  •  haloperidol lactate  •  hydrOXYzine HCL  •  ibuprofen  •  ibuprofen  •  ibuprofen  •  LORazepam  •  LORazepam  •  LORazepam  •  methocarbamol  •  polyethylene glycol  •  risperiDONE  •  risperiDONE  •  risperiDONE  •  senna-docusate sodium  •  traZODone    - Observation: routine    - VS: as per unit protocol  - Diet: Regular diet  - Psychoeducation (benefits and potential risks) discussed, importance of compliance with the psychiatric treatment reiterated, and the patient verbalized understanding of the matter  - Encourage group attendance and milieu therapy    - The pt was educated and agreed to verbalize any suicidal thoughts, frustrations or concerns to the nursing staff, immediately. - Dispo: TBD       Next of Kin  Extended Emergency Contact Information  Primary Emergency Contact: [address] [facility]  Mobile Phone: [phone]  Relation: Son In-Law  Secondary Emergency Contact: [place]  Address: [address] [facility]  Mobile Phone: [phone]  Relation: Son      Counseling / Coordination of Care  Patient's progress discussed with staff in treatment team meeting. Medications, treatment progress and treatment plan reviewed with patient. Medication changes discussed with patient.   Supportive therapy provided to patient. Cognitive techniques utilized during the session. Reassurance and supportive therapy provided. Reoriented to reality and reassured. Group attendance encouraged.      Lakesha Maloney MD  Attending [address] [place] [address] Drive

## 2023-09-20 NOTE — NURSING NOTE
Pt reports mild depression and anxiety this morning, but is overall feeling "alright". She is med/meal compliant. Pleasant on approach. Mostly isolative to self. Currently denying any unmet needs.

## 2023-09-20 NOTE — NURSING NOTE
Patient visible on the unit intermittently to sit with peers, otherwise isolative to self in room. Patient cooperative with assessment questions, reporting severe depression, anxiety, denies SI/HI/AVH. Patient compliant with medications and meals, appetite good. No further signs distress noted.

## 2023-09-20 NOTE — PROGRESS NOTES
09/20/23 1100   Activity/Group Checklist   Group Life Skills  (anger management/values)   Attendance Attended   Attendance Duration (min) 16-30   Interactions Interacted appropriately  (Pt. 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 Pt. describe her former self as a runner. Pt. unable to run her anger off."I'm not who I once was.")   Affect/Mood Appropriate;Normal range   Goals Achieved Able to listen to others; Able to self-disclose; Able to reflect/comment on own behavior;Discussed coping strategies; Identified feelings; Identified triggers

## 2023-09-20 NOTE — TREATMENT TEAM
Pt attended all groups. Pt restless and visible. 09/20/23 1000   Activity/Group Checklist   Group [place] Group Attended; Other (Comment)  (late)   Attendance Duration (min) 31-45   Interactions Did not interact  (trestless)   Affect/Mood Other (Comment)  (restless)   Goals Achieved Identified feelings; Identified relapse prevention strategies; Able to listen to others;Discussed coping strategies; Discussed self-esteem issues

## 2023-09-20 NOTE — PROGRESS NOTES
09/20/23 0816   Team Meeting   Meeting Type Daily Rounds   Initial Conference Date 09/20/23   Next Conference Date 09/21/23   Team Members Present   Team Members Present Physician;Nurse;;Occupational Therapist;   Physician Team Member Dr Carrie Srinivasan, Dr Laurent Lakesha Management Team Member [address] [place] [address] Work Team Member Diogo   OT Team Member Dylon Cruz   Patient/Family Present   Patient Present No   Patient's Family Present No     Discharge on Monday possible, depressed and anxious related to her pain, stays in her room most of the day, can increase lyrica more for pain.

## 2023-09-21 PROCEDURE — 99232 SBSQ HOSP IP/OBS MODERATE 35: CPT | Performed by: STUDENT IN AN ORGANIZED HEALTH CARE EDUCATION/TRAINING PROGRAM

## 2023-09-21 RX ORDER — ESCITALOPRAM OXALATE 10 MG/1
20 TABLET ORAL DAILY
Status: DISCONTINUED | OUTPATIENT
Start: 2023-09-22 | End: 2023-09-25 | Stop reason: HOSPADM

## 2023-09-21 RX ADMIN — SENNOSIDES AND DOCUSATE SODIUM 1 TABLET: 50; 8.6 TABLET ORAL at 21:12

## 2023-09-21 RX ADMIN — CLONAZEPAM 1 MG: 1 TABLET ORAL at 21:11

## 2023-09-21 RX ADMIN — Medication 2 G: at 21:16

## 2023-09-21 RX ADMIN — PANTOPRAZOLE SODIUM 40 MG: 40 TABLET, DELAYED RELEASE ORAL at 06:03

## 2023-09-21 RX ADMIN — MELATONIN TAB 3 MG 3 MG: 3 TAB at 21:11

## 2023-09-21 RX ADMIN — VALBENAZINE 40 MG: 40 CAPSULE ORAL at 08:04

## 2023-09-21 RX ADMIN — ASPIRIN 81 MG: 81 TABLET, COATED ORAL at 08:04

## 2023-09-21 RX ADMIN — ATORVASTATIN CALCIUM 40 MG: 40 TABLET, FILM COATED ORAL at 16:37

## 2023-09-21 RX ADMIN — HYDROXYZINE HYDROCHLORIDE 25 MG: 25 TABLET ORAL at 17:38

## 2023-09-21 RX ADMIN — ESCITALOPRAM 15 MG: 5 TABLET, FILM COATED ORAL at 08:03

## 2023-09-21 RX ADMIN — PREGABALIN 100 MG: 100 CAPSULE ORAL at 21:12

## 2023-09-21 RX ADMIN — PREGABALIN 100 MG: 100 CAPSULE ORAL at 08:04

## 2023-09-21 RX ADMIN — QUETIAPINE FUMARATE 150 MG: 50 TABLET, EXTENDED RELEASE ORAL at 21:12

## 2023-09-21 RX ADMIN — PREGABALIN 100 MG: 100 CAPSULE ORAL at 16:37

## 2023-09-21 RX ADMIN — CLONAZEPAM 0.5 MG: 0.5 TABLET ORAL at 08:03

## 2023-09-21 RX ADMIN — FERROUS SULFATE TAB 325 MG (65 MG ELEMENTAL FE) 325 MG: 325 (65 FE) TAB at 08:03

## 2023-09-21 NOTE — PROGRESS NOTES
09/21/23 0820   Team Meeting   Meeting Type Daily Rounds   Initial Conference Date 09/21/23   Next Conference Date 09/22/23   Team Members Present   Team Members Present Physician;Nurse;;Occupational Therapist;   Physician Team Member Dr Nicoel Wolfe, Dr Larry Figueroa Management Team Member [address] [place] [address] Work Team Member Diogo   OT Team Member Zhanna Barker   Patient/Family Present   Patient Present No   Patient's Family Present No     Discharge Monday, reports panic attacks related to going to her new apartment with her family, meds will be increased tomorrow.

## 2023-09-21 NOTE — NURSING NOTE
Patient visible on the unit for the majority of the shift sitting with peers either watching television or reading. Patient cooperative with assessment questions, reporting moderate depression and mild anxiety, denies SI/HI/AVH. Patient compliant with medications and meals, appetite good. No further signs of distress noted.

## 2023-09-21 NOTE — NURSING NOTE
Pt is pleasant on approach. She is stating that she feels "alright" today. Pt is med/meal compliant. Denies SI/HI or AVH. Pt resting comfortably in bed and is denying any unmet needs.

## 2023-09-21 NOTE — PLAN OF CARE
Problem: Potential for Falls  Goal: Patient will remain free of falls  Description: INTERVENTIONS:  - Educate patient/family on patient safety including physical limitations  - Instruct patient to call for assistance with activity   - Consult OT/PT to assist with strengthening/mobility   - Keep Call bell within reach  - Keep bed low and locked with side rails adjusted as appropriate  - Keep care items and personal belongings within reach  - Initiate and maintain comfort rounds  - Make Fall Risk Sign visible to staff  - Offer Toileting every  Hours, in advance of need  - Initiate/Maintain alarm  - Obtain necessary fall risk management equipment  - Apply yellow socks and bracelet for high fall risk patients  - Consider moving patient to room near nurses station  Outcome: Progressing     Problem: Depression  Goal: Treatment Goal: Demonstrate behavioral control of depressive symptoms, verbalize feelings of improved mood/affect, and adopt new coping skills prior to discharge  Outcome: Progressing  Goal: Verbalize thoughts and feelings  Description: Interventions:  - Assess and re-assess patient's level of risk   - Engage patient in 1:1 interactions, daily, for a minimum of 15 minutes   - Encourage patient to express feelings, fears, frustrations, hopes   Outcome: Progressing  Goal: Refrain from harming self  Description: Interventions:  - Monitor patient closely, per order   - Supervise medication ingestion, monitor effects and side effects   Outcome: Progressing  Goal: Refrain from isolation  Description: Interventions:  - Develop a trusting relationship   - Encourage socialization   Outcome: Progressing  Goal: Refrain from self-neglect  Outcome: Progressing  Goal: Attend and participate in unit activities, including therapeutic, recreational, and educational groups  Description: Interventions:  - Provide therapeutic and educational activities daily, encourage attendance and participation, and document same in the medical record   Outcome: Progressing  Goal: Complete daily ADLs, including personal hygiene independently, as able  Description: Interventions:  - Observe, teach, and assist patient with ADLS  -  Monitor and promote a balance of rest/activity, with adequate nutrition and elimination   Outcome: Progressing     Problem: Anxiety  Goal: Anxiety is at manageable level  Description: Interventions:  - Assess and monitor patient's anxiety level. - Monitor for signs and symptoms (heart palpitations, chest pain, shortness of breath, headaches, nausea, feeling jumpy, restlessness, irritable, apprehensive). - Collaborate with interdisciplinary team and initiate plan and interventions as ordered. - [place] patient to unit/surroundings  - Explain treatment plan  - Encourage participation in care  - Encourage verbalization of concerns/fears  - Identify coping mechanisms  - Assist in developing anxiety-reducing skills  - Administer/offer alternative therapies  - Limit or eliminate stimulants  Outcome: Progressing     Problem: BEHAVIOR  Goal: Pt/Family maintain appropriate behavior and adhere to behavioral management agreement, if implemented  Description: INTERVENTIONS:  - Assess the family dynamic   - Encourage verbalization of thoughts and concerns in a socially appropriate manner  - Assess patient/family's coping skills and non-compliant behavior (including use of illegal substances). - Utilize positive, consistent limit setting strategies supporting safety of patient, staff and others  - Initiate consult with Case Management, Spiritual Care or other ancillary services as appropriate  - If a patient's/visitor's behavior jeopardizes the safety of the patient, staff, or others, refer to organization procedure.    - Notify Security of behavior or suspected illegal substances which indicate the need for search of the patient and/or belongings  - Encourage participation in the decision making process about a behavioral management agreement; implement if patient meets criteria  Outcome: Progressing     Problem: SLEEP DISTURBANCE  Goal: Will exhibit normal sleeping pattern  Description: Interventions:  -  Assess the patients sleep pattern, noting recent changes  - Administer medication as ordered  - Decrease environmental stimuli, including noise, as appropriate during the night  - Encourage the patient to actively participate in unit groups and or exercise during the day to enhance ability to achieve adequate sleep at night  - Assess the patient, in the morning, encouraging a description of sleep experience  Outcome: Progressing     Problem: Prexisting or High Potential for Compromised Skin Integrity  Goal: Skin integrity is maintained or improved  Description: INTERVENTIONS:  - Identify patients at risk for skin breakdown  - Assess and monitor skin integrity  - Assess and monitor nutrition and hydration status  - Monitor labs   - Assess for incontinence   - Turn and reposition patient  - Assist with mobility/ambulation  - Relieve pressure over bony prominences  - Avoid friction and shearing  - Provide appropriate hygiene as needed including keeping skin clean and dry  - Evaluate need for skin moisturizer/barrier cream  - Collaborate with interdisciplinary team   - Patient/family teaching  - Consider wound care consult   Outcome: Progressing     Problem: DISCHARGE PLANNING - CARE MANAGEMENT  Goal: Discharge to post-acute care or home with appropriate resources  Description: INTERVENTIONS:  - Conduct assessment to determine patient/family and health care team treatment goals, and need for post-acute services based on payer coverage, community resources, and patient preferences, and barriers to discharge  - Address psychosocial, clinical, and financial barriers to discharge as identified in assessment in conjunction with the patient/family and health care team  - Arrange appropriate level of post-acute services according to patient’s needs and preference and payer coverage in collaboration with the physician and health care team  - Communicate with and update the patient/family, physician, and health care team regarding progress on the discharge plan  - Arrange appropriate transportation to post-acute venues  Outcome: Progressing

## 2023-09-21 NOTE — PROGRESS NOTES
Progress Note - Behavioral Health Melody [place] 59 y.o. female MRN: [id number]  Unit/Bed#: Rodrigo Barnett 779-07 Encounter: [id number]       Patient was visited on unit for continuing care; chart reviewed and discussed with multidisciplinary treatment team. On approach, the patient was calmer, more pleasant and cooperative. She reported feeling less anxious and better in general, and was less preoccupied with anticipatory anxiety sxs regarding having a panic attack, and denied any panic attacks or intense anxiety since yesterday. She has tolerated the cross titration and denied any side effects. Denied any changes in appetite, and energy level. No problem initiating and maintaining sleep and noted that her current meds has been helpful. She noted that she talked with her daughter and was happy about moving to a new house with her family. She appeared future oriented, looks forward to starting her day program at [address] [place] [address] upon discharge. She endorsed good relationship with her children and especially dated on her family relations. She denied A/VH currently. Denied SI/HI, intent or plan upon direct inquiry at this time. Patient continues to be intermittently visible in the milieu and interacts with select staff and peers. No reports of aggression or self-injurious behavior on unit. No PRN medications used in the past 24 hours. Patient accepted all offered medications and reported feeling better. No adverse effects of medications noted or reported. Given the lack of clinical response and to avoid polypharmacy, the patient's regimen is being simplified. Cymbalta was cross titrated to Lexapro, and Seroquel switched to Seroquel  mg nightly. Klonopin dose adjusted to 0.5/1 mg to be tapered off subsequently, and Lyrica increased from 50 mg daily to [facility] then gradually to 100 mg TID pm 9/20/23 to control pain and anxiety, and Remeron discontinued. Doses to be adjusted as indicated.  Maintained on Ingrezza 40 mg daily. PT/OT eval ordered and the patient was placed on fall and seizure precautions. Lexapro was increased to 15 mg daily on 9/20 and to be optimized to 20 mg tomorrow. Tentative discharge on Monday, and  scheduled the day program at [facility].      Current Mental Status Evaluation:  Appearance and attitude: appeared as stated age, dressed in hospital attire, wearing eyeglasses, with good hygiene, with edentulism  Eye contact: good  Motor Function: within normal limits, No PMA/PMR, tongue movements due to TD  Gait/station: Not observed  Speech: normal for rate, rhythm, volume, latency, amount  Language: No overt abnormality  Mood/affect: "better"; less anxious / Affect was constricted but reactive, mood congruent, brighter  Thought Processes: sequential and goal-directed  Thought content: denies suicidal ideation or homicidal ideation; no delusions or first rank symptoms  Associations: concrete associations  Perceptual disturbances: denies Auditory/Visual/Tactile Hallucinations  Orientation: oriented to time, person, place and to the situational context  Cognitive Function: intact  Memory: recent and remote memory grossly intact  Intellect: average  Fund of knowledge: aware of current events, aware of past history and vocabulary average  Impulse control: good  Insight/judgment: fair/fair    Pain: reported having pain in lower back  Pain scale: 6      Vital signs in last 24 hours:    Temp:  [97.6 °F (36.4 °C)-98.2 °F (36.8 °C)] 98.2 °F (36.8 °C)  HR:  [63-67] 63  Resp:  [16] 16  BP: (107-134)/(63-80) 107/63    Laboratory results: I have personally reviewed all pertinent laboratory/tests results    Results from the past 24 hours: No results found for this or any previous visit (from the past 24 hour(s)).     Progress Toward Goals: making gradual improvement    Assessment:  Principal Problem:    Panic disorder without agoraphobia  Active Problems:    Bipolar disorder (HCC)    Generalized anxiety disorder    Post traumatic stress disorder    Lumbar radiculopathy    GERD without esophagitis    Essential hypertension    Tardive dyskinesia    Medical clearance for psychiatric admission    Mixed hyperlipidemia    Anemia    Muscle spasm        Plan:  - f/u SLIM recs regarding the medical problems  - Continue medication titration and treatment plan; adjust medication to optimize treatment response and as clinically indicated.      Scheduled medications:  Current Facility-Administered Medications   Medication Dose Route Frequency Provider Last Rate   • amLODIPine  10 mg Oral Daily MERCY Cisneros     • aspirin  81 mg Oral Daily MERCY Cisneros     • atorvastatin  40 mg Oral Daily With [employer]     • bisacodyl  5 mg Oral Daily PRN Gricel Castro MD     • clonazePAM  0.5 mg Oral Daily Gricel Castro MD     • clonazePAM  1 mg Oral HS Gricel Castro MD     • Diclofenac Sodium  2 g Topical 4x Daily PRN MERCY Cisneros     • ergocalciferol  50,000 Units Oral Weekly MERCY Cisneros     • [START ON 9/22/2023] escitalopram  20 mg Oral Daily Gricel Castro MD     • ferrous sulfate  325 mg Oral Every Other Day MERCY Cisneros     • haloperidol lactate  5 mg Intramuscular Q4H PRN Max 4/day MERCY Thompson     • hydrOXYzine HCL  25 mg Oral Q6H PRN Max 4/day MERCY Thompson     • ibuprofen  200 mg Oral Q6H PRN MERCY Thompson     • ibuprofen  400 mg Oral Q6H PRN MERCY Thompson     • ibuprofen  600 mg Oral Q8H PRN MERCY Thompson     • LORazepam  1 mg Intramuscular Q6H PRN Max 3/day MERCY Thompson     • LORazepam  0.5 mg Oral Q6H PRN Max 4/day MERCY Thompson     • LORazepam  1 mg Oral Q6H PRN Max 3/day MERCY Thompson     • melatonin  3 mg Oral HS Gricel Castro MD     • methocarbamol  500 mg Oral Q6H PRN MERCY Cisneros     • pantoprazole  40 mg Oral Early Morning MERCY Cisneros     • polyethylene glycol  17 g Oral Daily PRN Ozzie [place], CRNP     • pregabalin  100 mg Oral TID Alonzo Kahn MD     • QUEtiapine  150 mg Oral HS Yehuda Sledge, CRNP     • risperiDONE  0.25 mg Oral Q4H PRN Max 6/day Ozzie [place], CRNP     • risperiDONE  0.5 mg Oral Q4H PRN Max 3/day Ozzie [place], CRNP     • risperiDONE  1 mg Oral Q2H PRN Max 3/day Ozzie [place], CRNP     • senna-docusate sodium  1 tablet Oral Daily PRN Ozzie [place], CRNP     • traZODone  50 mg Oral HS PRN Ozzie [place], CRNP     • Valbenazine Tosylate  40 mg Oral Daily Yehuda Sledge, CRNP          PRN:  •  bisacodyl  •  Diclofenac Sodium  •  haloperidol lactate  •  hydrOXYzine HCL  •  ibuprofen  •  ibuprofen  •  ibuprofen  •  LORazepam  •  LORazepam  •  LORazepam  •  methocarbamol  •  polyethylene glycol  •  risperiDONE  •  risperiDONE  •  risperiDONE  •  senna-docusate sodium  •  traZODone    - Observation: routine    - VS: as per unit protocol  - Diet: Regular diet  - Psychoeducation (benefits and potential risks) discussed, importance of compliance with the psychiatric treatment reiterated, and the patient verbalized understanding of the matter  - Encourage group attendance and milieu therapy    - The pt was educated and agreed to verbalize any suicidal thoughts, frustrations or concerns to the nursing staff, immediately. - Dispo: TBD       Next of Kin  · Extended Emergency Contact Information  · Primary Emergency Contact: Annel Villanueva  · Mobile Phone: [phone]  · Relation: Son In-Law  · Secondary Emergency Contact: Corbin Villafana  · Address: [facility] [address]  ·          [facility], [address] [place] [address]  · Mobile Phone: [phone]  · Relation: Son      Counseling / Coordination of Care  Patient's progress discussed with staff in treatment team meeting. Medications, treatment progress and treatment plan reviewed with patient. Medication changes discussed with patient.   Supportive therapy provided to patient. Cognitive techniques utilized during the session. Reassurance and supportive therapy provided. Reoriented to reality and reassured. Encouraged participation in milieu and group therapy on the unit. Discharge plan discussed with patient.      Cassie Figueroa MD  Attending [address] [place] [address]

## 2023-09-22 PROCEDURE — 97163 PT EVAL HIGH COMPLEX 45 MIN: CPT

## 2023-09-22 PROCEDURE — 99232 SBSQ HOSP IP/OBS MODERATE 35: CPT | Performed by: STUDENT IN AN ORGANIZED HEALTH CARE EDUCATION/TRAINING PROGRAM

## 2023-09-22 RX ORDER — LANOLIN ALCOHOL/MO/W.PET/CERES
3 CREAM (GRAM) TOPICAL
Qty: 30 TABLET | Refills: 0 | Status: SHIPPED | OUTPATIENT
Start: 2023-09-22 | End: 2023-10-22

## 2023-09-22 RX ORDER — PREGABALIN 100 MG/1
100 CAPSULE ORAL 3 TIMES DAILY
Qty: 90 CAPSULE | Refills: 0 | Status: SHIPPED | OUTPATIENT
Start: 2023-09-22 | End: 2023-10-22

## 2023-09-22 RX ORDER — CLONAZEPAM 0.5 MG/1
TABLET ORAL
Qty: 30 TABLET | Refills: 0 | Status: SHIPPED | OUTPATIENT
Start: 2023-09-22

## 2023-09-22 RX ORDER — AMLODIPINE BESYLATE 10 MG/1
10 TABLET ORAL DAILY
Qty: 30 TABLET | Refills: 0 | Status: SHIPPED | OUTPATIENT
Start: 2023-09-22 | End: 2023-10-22

## 2023-09-22 RX ORDER — ESCITALOPRAM OXALATE 20 MG/1
20 TABLET ORAL DAILY
Qty: 30 TABLET | Refills: 0 | Status: SHIPPED | OUTPATIENT
Start: 2023-09-23 | End: 2023-10-23

## 2023-09-22 RX ORDER — PANTOPRAZOLE SODIUM 40 MG/1
40 TABLET, DELAYED RELEASE ORAL
Qty: 30 TABLET | Refills: 0 | Status: SHIPPED | OUTPATIENT
Start: 2023-09-23 | End: 2023-10-23

## 2023-09-22 RX ORDER — HYDROXYZINE HYDROCHLORIDE 25 MG/1
25 TABLET, FILM COATED ORAL EVERY 8 HOURS PRN
Qty: 90 TABLET | Refills: 0 | Status: SHIPPED | OUTPATIENT
Start: 2023-09-22 | End: 2023-10-22

## 2023-09-22 RX ORDER — QUETIAPINE 150 MG/1
150 TABLET, FILM COATED, EXTENDED RELEASE ORAL
Qty: 30 TABLET | Refills: 0 | Status: SHIPPED | OUTPATIENT
Start: 2023-09-22 | End: 2023-10-22

## 2023-09-22 RX ADMIN — VALBENAZINE 40 MG: 40 CAPSULE ORAL at 08:09

## 2023-09-22 RX ADMIN — ESCITALOPRAM OXALATE 20 MG: 10 TABLET ORAL at 08:04

## 2023-09-22 RX ADMIN — Medication 2 G: at 09:40

## 2023-09-22 RX ADMIN — ASPIRIN 81 MG: 81 TABLET, COATED ORAL at 08:04

## 2023-09-22 RX ADMIN — ATORVASTATIN CALCIUM 40 MG: 40 TABLET, FILM COATED ORAL at 16:19

## 2023-09-22 RX ADMIN — PREGABALIN 100 MG: 100 CAPSULE ORAL at 08:04

## 2023-09-22 RX ADMIN — CLONAZEPAM 0.5 MG: 0.5 TABLET ORAL at 08:04

## 2023-09-22 RX ADMIN — PREGABALIN 100 MG: 100 CAPSULE ORAL at 16:19

## 2023-09-22 RX ADMIN — CLONAZEPAM 1 MG: 1 TABLET ORAL at 21:24

## 2023-09-22 RX ADMIN — ERGOCALCIFEROL 50000 UNITS: 1.25 CAPSULE ORAL at 08:04

## 2023-09-22 RX ADMIN — MELATONIN TAB 3 MG 3 MG: 3 TAB at 21:24

## 2023-09-22 RX ADMIN — PANTOPRAZOLE SODIUM 40 MG: 40 TABLET, DELAYED RELEASE ORAL at 06:26

## 2023-09-22 RX ADMIN — QUETIAPINE FUMARATE 150 MG: 50 TABLET, EXTENDED RELEASE ORAL at 21:24

## 2023-09-22 RX ADMIN — PREGABALIN 100 MG: 100 CAPSULE ORAL at 21:24

## 2023-09-22 NOTE — PLAN OF CARE
Problem: Potential for Falls  Goal: Patient will remain free of falls  Description: INTERVENTIONS:  - Educate patient/family on patient safety including physical limitations  - Instruct patient to call for assistance with activity   - Consult OT/PT to assist with strengthening/mobility   - Keep Call bell within reach  - Keep bed low and locked with side rails adjusted as appropriate  - Keep care items and personal belongings within reach  - Initiate and maintain comfort rounds  - Make Fall Risk Sign visible to staff  - Apply yellow socks and bracelet for high fall risk patients  - Consider moving patient to room near nurses station  Outcome: Progressing     Problem: Depression  Goal: Treatment Goal: Demonstrate behavioral control of depressive symptoms, verbalize feelings of improved mood/affect, and adopt new coping skills prior to discharge  Outcome: Progressing  Goal: Verbalize thoughts and feelings  Description: Interventions:  - Assess and re-assess patient's level of risk   - Engage patient in 1:1 interactions, daily, for a minimum of 15 minutes   - Encourage patient to express feelings, fears, frustrations, hopes   Outcome: Progressing  Goal: Refrain from harming self  Description: Interventions:  - Monitor patient closely, per order   - Supervise medication ingestion, monitor effects and side effects   Outcome: Progressing  Goal: Refrain from isolation  Description: Interventions:  - Develop a trusting relationship   - Encourage socialization   Outcome: Progressing  Goal: Refrain from self-neglect  Outcome: Progressing  Goal: Complete daily ADLs, including personal hygiene independently, as able  Description: Interventions:  - Observe, teach, and assist patient with ADLS  -  Monitor and promote a balance of rest/activity, with adequate nutrition and elimination   Outcome: Progressing     Problem: Anxiety  Goal: Anxiety is at manageable level  Description: Interventions:  - Assess and monitor patient's anxiety level. - Monitor for signs and symptoms (heart palpitations, chest pain, shortness of breath, headaches, nausea, feeling jumpy, restlessness, irritable, apprehensive). - Collaborate with interdisciplinary team and initiate plan and interventions as ordered. - [place] patient to unit/surroundings  - Explain treatment plan  - Encourage participation in care  - Encourage verbalization of concerns/fears  - Identify coping mechanisms  - Assist in developing anxiety-reducing skills  - Administer/offer alternative therapies  - Limit or eliminate stimulants  Outcome: Progressing     Problem: BEHAVIOR  Goal: Pt/Family maintain appropriate behavior and adhere to behavioral management agreement, if implemented  Description: INTERVENTIONS:  - Assess the family dynamic   - Encourage verbalization of thoughts and concerns in a socially appropriate manner  - Assess patient/family's coping skills and non-compliant behavior (including use of illegal substances). - Utilize positive, consistent limit setting strategies supporting safety of patient, staff and others  - Initiate consult with Case Management, Spiritual Care or other ancillary services as appropriate  - If a patient's/visitor's behavior jeopardizes the safety of the patient, staff, or others, refer to organization procedure.    - Notify Security of behavior or suspected illegal substances which indicate the need for search of the patient and/or belongings  - Encourage participation in the decision making process about a behavioral management agreement; implement if patient meets criteria  Outcome: Progressing     Problem: SLEEP DISTURBANCE  Goal: Will exhibit normal sleeping pattern  Description: Interventions:  -  Assess the patients sleep pattern, noting recent changes  - Administer medication as ordered  - Decrease environmental stimuli, including noise, as appropriate during the night  - Encourage the patient to actively participate in unit groups and or exercise during the day to enhance ability to achieve adequate sleep at night  - Assess the patient, in the morning, encouraging a description of sleep experience  Outcome: Progressing     Problem: Prexisting or High Potential for Compromised Skin Integrity  Goal: Skin integrity is maintained or improved  Description: INTERVENTIONS:  - Identify patients at risk for skin breakdown  - Assess and monitor skin integrity  - Assess and monitor nutrition and hydration status  - Monitor labs   - Assess for incontinence   - Turn and reposition patient  - Assist with mobility/ambulation  - Relieve pressure over bony prominences  - Avoid friction and shearing  - Provide appropriate hygiene as needed including keeping skin clean and dry  - Evaluate need for skin moisturizer/barrier cream  - Collaborate with interdisciplinary team   - Patient/family teaching  - Consider wound care consult   Outcome: Progressing

## 2023-09-22 NOTE — PROGRESS NOTES
09/22/23 1100 09/22/23 1400   Activity/Group Checklist   Group [facility]  (states and fall trivia) Wellness  (guided imagery relaxation ocean walk.)   Attendance Attended Attended   Attendance Duration (min) 16-30 16-30   Interactions Interacted appropriately Interacted appropriately   Affect/Mood Appropriate;Normal range Calm   Goals Achieved Able to listen to others; Able to engage in interactions Able to engage in interactions; Able to listen to others

## 2023-09-22 NOTE — PROGRESS NOTES
09/22/23 0824   Team Meeting   Meeting Type Daily Rounds   Initial Conference Date 09/22/23   Next Conference Date 09/25/23   Team Members Present   Team Members Present Physician;Nurse;;; Occupational Therapist   Physician Team Member Dr Micky Adrian, Dr Phoebe Greene Management Team Member [address] [place] [address] Work Team Member Kris Mora   OT Team Member Jalil Lee   Patient/Family Present   Patient Present No   Patient's Family Present No     Visible, pleasant, cooperative, PRN senna given at 2112, atarax at 1738 effective, AM amlodipine held due to low BP, discharge Monday.

## 2023-09-22 NOTE — NURSING NOTE
Patient is alert and oriented able to make her needs known. Patient is visible in milieu, pleasant and cooperative on approach. Patient c/o constipation. PRN senna 8.6-50 administered at 2112. Patient denies SI/HI/AVH. Safety checks ongoing.

## 2023-09-22 NOTE — DISCHARGE INSTR - OTHER ORDERS
You are being discharged to [address] [facility]. Triggers you have identified during your hospitalization that led to your admission are distressed mood, and david attacks related to your health. Coping skills you have identified during your hospitalization include taking slow breaths, positive thoughts, and music. If you are unable to deal with your distressed mood alone please contact Sonny or your daughter. If that is not effective and you continue to have a distressed mood, are feeling overwhelmed, and/or are in crisis please contact [facility] ([facility] AT [place] at [phone], dial 911 or go to the nearest emergency center. [address] [facility] [address]: [phone] - 24/7    A warm line is a phone service for people who are looking for support, engagement, and hope during non-emergency mental health struggles or distress. A warm line is staffed by peers who have personal or lived experience with mental health disorders and who can listen, share, and offer a sense of recovery. A warm line is different from a crisis line or hotline, which are intended for emergency situations    [place] Suicide Hotline: [address] [facility]    Crisis Text Line: 589153      Brie Pardo, our [address], will be calling you after your discharge, on the phone number that you provided. They will be available as an additional support, if needed. If you wish to speak with one of them, you may contact Bryan Cortes at [phone] or Óscar Watts at [phone].

## 2023-09-22 NOTE — PROGRESS NOTES
Progress Note - Behavioral Health   Samantha Conroy Heart 59 y.o. female MRN: [id number]  Unit/Bed#: Lissette Azul 015-41 Encounter: [id number]       Patient was visited on unit for continuing care; chart reviewed and discussed with multidisciplinary treatment team. On approach, the patient was calm, pleasant and cooperative. She endorsed better mood and improving control of her anxiety. She denied any panic attacks over past 24 hours. The patient showed the art work done by her to the writer and noted that she wanted to give them to her grandchildren. She appeared future oriented and is looking forward to getting back home, moving to the new house with her daughter and her family, and starting the day program at [address] [place] [address]. She asked if she could have haircuts before discharge "to look good when get[s] home". Denied any changes in appetite, and energy level. No problem initiating and maintaining sleep. Denied A/VH currently. Denied SI/HI, intent or plan upon direct inquiry at this time. Patient continues to be intermittently visible in the milieu and interacts with select staff and peers. No reports of aggression or self-injurious behavior on unit. No PRN medications used in the past 24 hours. Patient accepted all offered medications and reported feeling better. No adverse effects of medications noted or reported. Given the lack of clinical response and to avoid polypharmacy, the patient's regimen was simplified. Cymbalta was cross titrated to Lexapro, and Seroquel switched to Seroquel  mg nightly. Klonopin dose adjusted to 0.5/1 mg to be tapered off subsequently, and Lyrica increased gradually to 100 mg TID on 9/20/23 to control pain and anxiety, and Remeron discontinued. Doses to be adjusted as indicated. Maintained on Ingrezza 40 mg daily. PT/OT eval ordered and the patient was placed on fall and seizure precautions. Lexapro gradually optimized to 20 mg on 9/22.  Tentative discharge on Monday, and the  has scheduled the day program at [facility].      Current Mental Status Evaluation:  Appearance and attitude: appeared as stated age, dressed in hospital attire, with good hygiene, w/ edentulism  Eye contact: good  Motor Function: within normal limits, No PMA/PMR, tongue movements due to TD and edentulism  Gait/station: Not observed  Speech: normal for rate, rhythm, volume, latency, amount  Language: No overt abnormality  Mood/affect: euthymic / Affect was euthymic, reactive, in full range, normal intensity and mood congruent  Thought Processes: sequential and goal-directed  Thought content: denies suicidal ideation or homicidal ideation; no delusions or first rank symptoms  Associations: concrete associations  Perceptual disturbances: denies Auditory/Visual/Tactile Hallucinations  Orientation: oriented to time, person, place and to the situational context  Cognitive Function: intact  Memory: recent and remote memory grossly intact  Intellect: average  Fund of knowledge: aware of current events, aware of past history and vocabulary average  Impulse control: good  Insight/judgment: fair/fair    Pain: reported having pain in lower back  Pain scale: 5      Vital signs in last 24 hours:    Temp:  [97.2 °F (36.2 °C)-97.8 °F (36.6 °C)] 97.2 °F (36.2 °C)  HR:  [57-66] 57  Resp:  [16-18] 18  BP: (109-149)/(59-79) 109/59    Laboratory results: I have personally reviewed all pertinent laboratory/tests results    Results from the past 24 hours: No results found for this or any previous visit (from the past 24 hour(s)).     Progress Toward Goals: making improvement    Assessment:  Principal Problem:    Panic disorder without agoraphobia  Active Problems:    Bipolar disorder (HCC)    Generalized anxiety disorder    Post traumatic stress disorder    Lumbar radiculopathy    GERD without esophagitis    Essential hypertension    Tardive dyskinesia    Medical clearance for psychiatric admission    Mixed hyperlipidemia Anemia    Muscle spasm        Plan:  - f/u SLIM recs regarding the medical problems  - Continue medication titration and treatment plan; adjust medication to optimize treatment response and as clinically indicated.      Scheduled medications:  Current Facility-Administered Medications   Medication Dose Route Frequency Provider Last Rate   • amLODIPine  10 mg Oral Daily MERCY Cisneros     • aspirin  81 mg Oral Daily MERCY Cisneros     • atorvastatin  40 mg Oral Daily With [employer]     • bisacodyl  5 mg Oral Daily PRN Jocelyn Waggoner MD     • clonazePAM  0.5 mg Oral Daily Jocelyn Waggoner MD     • clonazePAM  1 mg Oral HS Jocelyn Waggoner MD     • Diclofenac Sodium  2 g Topical 4x Daily PRN MERCY Cisneros     • ergocalciferol  50,000 Units Oral Weekly MERCY Cisneros     • escitalopram  20 mg Oral Daily Jocelyn Waggoner MD     • ferrous sulfate  325 mg Oral Every Other Day MERCY Cisneros     • haloperidol lactate  5 mg Intramuscular Q4H PRN Max 4/day Jayson Sever, CRNP     • hydrOXYzine HCL  25 mg Oral Q6H PRN Max 4/day Jayson Sever, CRNP     • ibuprofen  200 mg Oral Q6H PRN Jayson Sever, CRNP     • ibuprofen  400 mg Oral Q6H PRN Jayson Sever, CRNP     • ibuprofen  600 mg Oral Q8H PRN Jayson Sever, CRNP     • LORazepam  1 mg Intramuscular Q6H PRN Max 3/day Jayson Sever, CRNP     • LORazepam  0.5 mg Oral Q6H PRN Max 4/day Jayson Sever, CRNP     • LORazepam  1 mg Oral Q6H PRN Max 3/day Jayson Sever, CRNP     • melatonin  3 mg Oral HS Jocelyn Waggoner MD     • methocarbamol  500 mg Oral Q6H PRN MERCY Cisneros     • pantoprazole  40 mg Oral Early Morning MERCY Cisneros     • polyethylene glycol  17 g Oral Daily PRN Jayson Sever, CRNP     • pregabalin  100 mg Oral TID Jocelyn Waggoner MD     • QUEtiapine  150 mg Oral HS Jayson Sever, CRNP     • risperiDONE  0.25 mg Oral Q4H PRN Max 6/day Jayson Sever, BOBBYNP     • risperiDONE  0.5 mg Oral Q4H PRN Max 3/day MERCY Thompson     • risperiDONE  1 mg Oral Q2H PRN Max 3/day MERCY Thompson     • senna-docusate sodium  1 tablet Oral Daily PRN MERCY Thompson     • traZODone  50 mg Oral HS PRN MERCY Thompson     • Valbenazine Tosylate  40 mg Oral Daily MERCY Thompson          PRN:  •  bisacodyl  •  Diclofenac Sodium  •  haloperidol lactate  •  hydrOXYzine HCL  •  ibuprofen  •  ibuprofen  •  ibuprofen  •  LORazepam  •  LORazepam  •  LORazepam  •  methocarbamol  •  polyethylene glycol  •  risperiDONE  •  risperiDONE  •  risperiDONE  •  senna-docusate sodium  •  traZODone    - Observation: routine    - VS: as per unit protocol  - Diet: Regular diet  - Psychoeducation (benefits and potential risks) discussed, importance of compliance with the psychiatric treatment reiterated, and the patient verbalized understanding of the matter  - Encourage group attendance and milieu therapy    - The pt was educated and agreed to verbalize any suicidal thoughts, frustrations or concerns to the nursing staff, immediately. - Dispo: TBD       Next of Kin  Extended Emergency Contact Information  Primary Emergency Contact: [address] [facility]  Mobile Phone: [phone]  Relation: Son In-Law  Secondary Emergency Contact: [place]  Address: [address] [facility]  Mobile Phone: [phone]  Relation: Son      Counseling / Coordination of Care  Patient's progress discussed with staff in treatment team meeting. Medications, treatment progress and treatment plan reviewed with patient. Medication changes discussed with patient. Supportive therapy provided to patient. Cognitive techniques utilized during the session. Reassurance and supportive therapy provided. Reoriented to reality and reassured. Encouraged participation in milieu and group therapy on the unit.   Crisis/safety plan discussed with patient. Discharge plan discussed with patient.      Jamee Ferguson MD  Attending [address] [place] [address]

## 2023-09-22 NOTE — NURSING NOTE
Pt is pleasant on approach. Pt denies all psych symptoms currently, and says her anxiety is "alright". Pt is med/meal compliant. Social and visible in the milieu. Pt asked for PRN Voltaren for back pain. Currently denying any unmet needs.

## 2023-09-22 NOTE — CASE MANAGEMENT
Jyotsna Perez at the [address] [facility] to coordinate an appointment for patient. Left voicemail, awaiting call back. *update: appt scheduled for 9/27/23 @ 10 am with MERCY Cain    Spoke to the daughter Rayray Cook ([phone]) to advise of the patient's discharge on Monday at 1 pm, advised to send her in a lyft as she does not have transportation for her. She will be at the house when she arrives.

## 2023-09-23 PROCEDURE — 99232 SBSQ HOSP IP/OBS MODERATE 35: CPT

## 2023-09-23 RX ADMIN — Medication 2 G: at 03:16

## 2023-09-23 RX ADMIN — MELATONIN TAB 3 MG 3 MG: 3 TAB at 21:06

## 2023-09-23 RX ADMIN — VALBENAZINE 40 MG: 40 CAPSULE ORAL at 09:41

## 2023-09-23 RX ADMIN — PANTOPRAZOLE SODIUM 40 MG: 40 TABLET, DELAYED RELEASE ORAL at 05:47

## 2023-09-23 RX ADMIN — CLONAZEPAM 0.5 MG: 0.5 TABLET ORAL at 09:39

## 2023-09-23 RX ADMIN — METHOCARBAMOL 500 MG: 500 TABLET, FILM COATED ORAL at 17:42

## 2023-09-23 RX ADMIN — SENNOSIDES AND DOCUSATE SODIUM 1 TABLET: 50; 8.6 TABLET ORAL at 21:06

## 2023-09-23 RX ADMIN — ESCITALOPRAM OXALATE 20 MG: 10 TABLET ORAL at 09:38

## 2023-09-23 RX ADMIN — ATORVASTATIN CALCIUM 40 MG: 40 TABLET, FILM COATED ORAL at 17:00

## 2023-09-23 RX ADMIN — ASPIRIN 81 MG: 81 TABLET, COATED ORAL at 09:39

## 2023-09-23 RX ADMIN — PREGABALIN 100 MG: 100 CAPSULE ORAL at 09:38

## 2023-09-23 RX ADMIN — CLONAZEPAM 1 MG: 1 TABLET ORAL at 21:06

## 2023-09-23 RX ADMIN — PREGABALIN 100 MG: 100 CAPSULE ORAL at 21:06

## 2023-09-23 RX ADMIN — QUETIAPINE FUMARATE 150 MG: 50 TABLET, EXTENDED RELEASE ORAL at 21:06

## 2023-09-23 RX ADMIN — IBUPROFEN 600 MG: 600 TABLET ORAL at 03:16

## 2023-09-23 RX ADMIN — POLYETHYLENE GLYCOL 3350 17 G: 17 POWDER, FOR SOLUTION ORAL at 17:53

## 2023-09-23 RX ADMIN — PREGABALIN 100 MG: 100 CAPSULE ORAL at 17:00

## 2023-09-23 RX ADMIN — FERROUS SULFATE TAB 325 MG (65 MG ELEMENTAL FE) 325 MG: 325 (65 FE) TAB at 09:38

## 2023-09-23 RX ADMIN — AMLODIPINE BESYLATE 10 MG: 10 TABLET ORAL at 09:39

## 2023-09-23 NOTE — NURSING NOTE
Patient visible on the unit sitting with peers in the dining room for the majority of the shift, social with staff on approach and to make needs known. Patient cooperative with assessment questions, reporting mild depression, anxiety, SI/HI/AVH. Patient compliant with medications and meals, appetite good. No further signs of distress noted.

## 2023-09-23 NOTE — PLAN OF CARE
Problem: Potential for Falls  Goal: Patient will remain free of falls  Description: INTERVENTIONS:  - Educate patient/family on patient safety including physical limitations  - Instruct patient to call for assistance with activity   - Consult OT/PT to assist with strengthening/mobility   - Keep Call bell within reach  - Keep bed low and locked with side rails adjusted as appropriate  - Keep care items and personal belongings within reach  - Initiate and maintain comfort rounds  - Make Fall Risk Sign visible to staff  - Offer Toileting every  Hours, in advance of need  - Initiate/Maintain alarm  - Obtain necessary fall risk management equipment:   - Apply yellow socks and bracelet for high fall risk patients  - Consider moving patient to room near nurses station  Outcome: Progressing     Problem: Depression  Goal: Treatment Goal: Demonstrate behavioral control of depressive symptoms, verbalize feelings of improved mood/affect, and adopt new coping skills prior to discharge  Outcome: Progressing  Goal: Verbalize thoughts and feelings  Description: Interventions:  - Assess and re-assess patient's level of risk   - Engage patient in 1:1 interactions, daily, for a minimum of 15 minutes   - Encourage patient to express feelings, fears, frustrations, hopes   Outcome: Progressing  Goal: Refrain from harming self  Description: Interventions:  - Monitor patient closely, per order   - Supervise medication ingestion, monitor effects and side effects   Outcome: Progressing  Goal: Refrain from isolation  Description: Interventions:  - Develop a trusting relationship   - Encourage socialization   Outcome: Progressing  Goal: Refrain from self-neglect  Outcome: Progressing  Goal: Complete daily ADLs, including personal hygiene independently, as able  Description: Interventions:  - Observe, teach, and assist patient with ADLS  -  Monitor and promote a balance of rest/activity, with adequate nutrition and elimination   Outcome: Progressing     Problem: Anxiety  Goal: Anxiety is at manageable level  Description: Interventions:  - Assess and monitor patient's anxiety level. - Monitor for signs and symptoms (heart palpitations, chest pain, shortness of breath, headaches, nausea, feeling jumpy, restlessness, irritable, apprehensive). - Collaborate with interdisciplinary team and initiate plan and interventions as ordered. - [place] patient to unit/surroundings  - Explain treatment plan  - Encourage participation in care  - Encourage verbalization of concerns/fears  - Identify coping mechanisms  - Assist in developing anxiety-reducing skills  - Administer/offer alternative therapies  - Limit or eliminate stimulants  Outcome: Progressing     Problem: BEHAVIOR  Goal: Pt/Family maintain appropriate behavior and adhere to behavioral management agreement, if implemented  Description: INTERVENTIONS:  - Assess the family dynamic   - Encourage verbalization of thoughts and concerns in a socially appropriate manner  - Assess patient/family's coping skills and non-compliant behavior (including use of illegal substances). - Utilize positive, consistent limit setting strategies supporting safety of patient, staff and others  - Initiate consult with Case Management, Spiritual Care or other ancillary services as appropriate  - If a patient's/visitor's behavior jeopardizes the safety of the patient, staff, or others, refer to organization procedure.    - Notify Security of behavior or suspected illegal substances which indicate the need for search of the patient and/or belongings  - Encourage participation in the decision making process about a behavioral management agreement; implement if patient meets criteria  Outcome: Progressing     Problem: SLEEP DISTURBANCE  Goal: Will exhibit normal sleeping pattern  Description: Interventions:  -  Assess the patients sleep pattern, noting recent changes  - Administer medication as ordered  - Decrease environmental stimuli, including noise, as appropriate during the night  - Encourage the patient to actively participate in unit groups and or exercise during the day to enhance ability to achieve adequate sleep at night  - Assess the patient, in the morning, encouraging a description of sleep experience  Outcome: Progressing     Problem: Prexisting or High Potential for Compromised Skin Integrity  Goal: Skin integrity is maintained or improved  Description: INTERVENTIONS:  - Identify patients at risk for skin breakdown  - Assess and monitor skin integrity  - Assess and monitor nutrition and hydration status  - Monitor labs   - Assess for incontinence   - Turn and reposition patient  - Assist with mobility/ambulation  - Relieve pressure over bony prominences  - Avoid friction and shearing  - Provide appropriate hygiene as needed including keeping skin clean and dry  - Evaluate need for skin moisturizer/barrier cream  - Collaborate with interdisciplinary team   - Patient/family teaching  - Consider wound care consult   Outcome: Progressing

## 2023-09-23 NOTE — NURSING NOTE
The patient came to nursing station asking for something for generalized pain 9/10. Patient was given Voltaren 2g and Ibuprofen 600 mg at 0316.  Continue to monitor

## 2023-09-23 NOTE — NURSING NOTE
After having dinner pt started to complain on stomach fullness and some pain in upper epigastria area. Stomach was checked, was soft, extended, and non tender on palpation. Patient was passing flatus. She said she is having a hernia that painful at time. Robaxin was given to relax her stomach muscles at 1742. Almost at the same time pt received Miralax to help her to move bowels as she said she had a small BM this am and the previous BM was 2 days ago. She is prone to have hard stools. Otherwise, patient was doing well entire shift, was med and meal compliant with no behavorial issues. Will continue to monitor.

## 2023-09-23 NOTE — PROGRESS NOTES
Progress Note - Behavioral Health   Samantha Myers 59 y.o. female MRN: [id number]  Unit/Bed#: [facility] 005-50 Encounter: [id number]    Assessment/Plan   Principal Problem:    Panic disorder without agoraphobia  Active Problems:    Lumbar radiculopathy    Bipolar disorder ([address])    GERD without esophagitis    Generalized anxiety disorder    Essential hypertension    Post traumatic stress disorder    Tardive dyskinesia    Medical clearance for psychiatric admission    Mixed hyperlipidemia    Anemia    Muscle spasm      Progress Toward Goals: Unchanged. No significant changes in behaviors or clinical status over the last 24 hours. she will continue working on current treatment goals which include: 1. Continue with group therapy, milieu therapy and occupational therapy  2. Behavioral Health checks every 7 minutes  3. Continue frequent safety checks and vitals per unit protocol  4. Continue with SLIM medical management as indicated  5. The patient will be maintained on the following medications:  6. Disposition Planning: Discharge planning and efforts remain ongoing per primary treatment teams recommendation    Psychiatric Review of Systems:  Behavior over the last 24 hours: Unchanged  Sleep: sleeping okay throughout the night  Appetite: adequate  Medication side effects: none reported  ROS:all other systems are negative    Patient was seen today for continuation of care, records reviewed and patient was discussed with the morning case review team.  No behavioral outbursts or acute events noted overnight and no significant changes in behaviors or clinical status over the last 24 hours. Samantha was seen today while lying in bed. Stating that she had mild anxiety attack this morning, patient reports that symptoms were resolved with deep breathing. When questioned about using techniques well at home, she reports that she tries but is often unsuccessful.   Complaining of generalized pain, Voltaren and ibuprofen given and deemed effective. We will continue with current regimen as Lexapro recently increased to 20 mg daily, and Klonopin continues to be tapered off. Samantha denies suicidal ideation/intent/plan. Samantha also denies HI/AH/VH, does not voice any paranoia and delusional content, and does not appear overtly manic. Samantha states that she feels safe on the unit. No medication changes indicated at this time, continue current medication regimen. Vitals:  Vitals:    09/23/23 0740   BP: 125/77   Pulse: 64   Resp: 17   Temp: 97.5 °F (36.4 °C)   SpO2: 97%       Laboratory Results:    I have personally reviewed all pertinent laboratory/tests results.   Most Recent Labs:   Lab Results   Component Value Date    WBC 3.88 (L) 09/15/2023    RBC 4.46 09/15/2023    HGB 13.5 09/15/2023    HCT 41.2 09/15/2023     09/15/2023    RDW 13.8 09/15/2023    TOTANEUTABS 0.83 (L) 05/25/2023    NEUTROABS 1.31 (L) 09/15/2023    SODIUM 139 09/15/2023    K 3.8 09/15/2023     09/15/2023    CO2 26 09/15/2023    BUN 16 09/15/2023    CREATININE 0.80 09/15/2023    GLUC 91 09/15/2023    GLUF 91 09/15/2023    CALCIUM 8.6 09/15/2023    AST 18 09/15/2023    ALT 13 09/15/2023    ALKPHOS 78 09/15/2023    TP 6.4 09/15/2023    ALB 4.1 09/15/2023    TBILI 0.92 09/15/2023    CHOLESTEROL 139 09/15/2023    HDL 74 09/15/2023    TRIG 45 09/15/2023    LDLCALC 56 09/15/2023    NONHDLC 65 09/15/2023    LITHIUM <0.1 (L) 03/23/2023    AMMONIA <10 (L) 06/27/2021    KPH6QAOIPTQJ 1.189 09/15/2023    FREET4 0.80 02/13/2020    PREGSERUM Negative 02/22/2014    RPR Non-Reactive 06/29/2021    HGBA1C 4.5 07/19/2023    EAG 82 07/19/2023       Mental Status Evaluation:  Appearance:  dressed appropriately, adequate grooming   Behavior:  cooperative, calm, interacts appropriately with this writer   Speech:  normal rate and volume   Mood:  anxious   Affect:  constricted   Thought Process:  goal directed, linear   Associations: intact associations   Thought Content:  no overt delusions, no paranoia noted on exam   Perceptual Disturbances: denies auditory hallucinations when asked, does not appear responding to internal stimuli   Risk Potential: Suicidal ideation - None  Homicidal ideation - None  Potential for aggression - No   Sensorium:  oriented to person, place and time/date   Memory:  recent and remote memory grossly intact   Consciousness:  alert and awake   Attention/Concentration: attention span and concentration are age appropriate   Insight:  moderate   Judgment: moderate   Gait/Station: normal gait/station   Motor Activity: no abnormal movements     Current Facility-Administered Medications   Medication Dose Route Frequency Provider Last Rate   • amLODIPine  10 mg Oral Daily MERCY Cisneros     • aspirin  81 mg Oral Daily MERCY Cisneros     • atorvastatin  40 mg Oral Daily With [employer]     • bisacodyl  5 mg Oral Daily PRN Feli Mitchell MD     • clonazePAM  0.5 mg Oral Daily Feli Mitchell MD     • clonazePAM  1 mg Oral HS Feli Mitchell MD     • Diclofenac Sodium  2 g Topical 4x Daily PRN MERCY Cisneros     • ergocalciferol  50,000 Units Oral Weekly MERCY Cisneros     • escitalopram  20 mg Oral Daily Feli Mitchell MD     • ferrous sulfate  325 mg Oral Every Other Day MERCY Cisneros     • haloperidol lactate  5 mg Intramuscular Q4H PRN Max 4/day MERCY Greer     • hydrOXYzine HCL  25 mg Oral Q6H PRN Max 4/day MERCY Greer     • ibuprofen  200 mg Oral Q6H PRN MERCY Greer     • ibuprofen  400 mg Oral Q6H PRN MERCY Greer     • ibuprofen  600 mg Oral Q8H PRN MERCY Greer     • LORazepam  1 mg Intramuscular Q6H PRN Max 3/day MERCY Greer     • LORazepam  0.5 mg Oral Q6H PRN Max 4/day MERCY Greer     • LORazepam  1 mg Oral Q6H PRN Max 3/day MERCY Greer     • melatonin  3 mg Oral HS Feli Mitchell MD     • methocarbamol  500 mg Oral Q6H PRN MERCY Cisneros     • pantoprazole  40 mg Oral Early Morning MERCY Cisneros     • polyethylene glycol  17 g Oral Daily PRN Jayson Sever, CRNP     • pregabalin  100 mg Oral TID Jocelyn Waggoner MD     • QUEtiapine  150 mg Oral HS Jayson Sever, CRNP     • risperiDONE  0.25 mg Oral Q4H PRN Max 6/day Jayson Sever, CRNP     • risperiDONE  0.5 mg Oral Q4H PRN Max 3/day Jayson Sever, CRNP     • risperiDONE  1 mg Oral Q2H PRN Max 3/day Jayson Sever, CRNP     • senna-docusate sodium  1 tablet Oral Daily PRN Jayson Sever, CRNP     • traZODone  50 mg Oral HS PRN Jayson Sever, CRNP     • Valbenazine Tosylate  40 mg Oral Daily Jayson Sever, CRNP          Discharge Disposition: Discharge disposition and planning remain ongoing    Risks / Benefits of Treatment:  Risks, benefits, and possible side effects of medications explained to patient and patient verbalizes understanding and agreement for treatment. Counseling / Coordination of Care: Total floor/unit time spent today 25 minutes. Greater than 50% of total time was spent with the patient and / or family counseling and / or coordination of care. A description of the counseling / coordination of care:   Patient's progress discussed with staff in treatment team meeting. Medications, treatment progress and treatment plan reviewed with patient. Educated on importance of medication and treatment compliance. Reassurance and supportive therapy provided. Encouraged participation in milieu and group therapy on the unit.     Sharan St

## 2023-09-24 PROCEDURE — 99232 SBSQ HOSP IP/OBS MODERATE 35: CPT

## 2023-09-24 RX ORDER — LACTULOSE 20 G/30ML
30 SOLUTION ORAL ONCE
Status: COMPLETED | OUTPATIENT
Start: 2023-09-24 | End: 2023-09-24

## 2023-09-24 RX ADMIN — ASPIRIN 81 MG: 81 TABLET, COATED ORAL at 08:30

## 2023-09-24 RX ADMIN — IBUPROFEN 600 MG: 600 TABLET ORAL at 09:19

## 2023-09-24 RX ADMIN — QUETIAPINE FUMARATE 150 MG: 50 TABLET, EXTENDED RELEASE ORAL at 21:13

## 2023-09-24 RX ADMIN — PREGABALIN 100 MG: 100 CAPSULE ORAL at 08:30

## 2023-09-24 RX ADMIN — LACTULOSE 30 G: 20 SOLUTION ORAL at 09:42

## 2023-09-24 RX ADMIN — CLONAZEPAM 0.5 MG: 0.5 TABLET ORAL at 08:30

## 2023-09-24 RX ADMIN — PREGABALIN 100 MG: 100 CAPSULE ORAL at 21:12

## 2023-09-24 RX ADMIN — MELATONIN TAB 3 MG 3 MG: 3 TAB at 21:13

## 2023-09-24 RX ADMIN — PANTOPRAZOLE SODIUM 40 MG: 40 TABLET, DELAYED RELEASE ORAL at 05:39

## 2023-09-24 RX ADMIN — PREGABALIN 100 MG: 100 CAPSULE ORAL at 15:31

## 2023-09-24 RX ADMIN — ESCITALOPRAM OXALATE 20 MG: 10 TABLET ORAL at 08:31

## 2023-09-24 RX ADMIN — VALBENAZINE 40 MG: 40 CAPSULE ORAL at 08:32

## 2023-09-24 RX ADMIN — SENNOSIDES AND DOCUSATE SODIUM 1 TABLET: 50; 8.6 TABLET ORAL at 09:19

## 2023-09-24 RX ADMIN — CLONAZEPAM 1 MG: 1 TABLET ORAL at 21:13

## 2023-09-24 RX ADMIN — ATORVASTATIN CALCIUM 40 MG: 40 TABLET, FILM COATED ORAL at 15:31

## 2023-09-24 NOTE — NURSING NOTE
The patient has been in and out of her room throughout the night. She is calm and cooperative with staff/peers. Patient denies anxiety and depression. Denies SI/HI/AVH. The patient has been med compliant. Safety checks ongoing.

## 2023-09-24 NOTE — PROGRESS NOTES
Progress Note - Behavioral Health   Samantha Elliott 59 y.o. female MRN: [id number]  Unit/Bed#: [facility] 186-51 Encounter: [id number]    Assessment/Plan   Principal Problem:    Panic disorder without agoraphobia  Active Problems:    Lumbar radiculopathy    Bipolar disorder ([address])    GERD without esophagitis    Generalized anxiety disorder    Essential hypertension    Post traumatic stress disorder    Tardive dyskinesia    Medical clearance for psychiatric admission    Mixed hyperlipidemia    Anemia    Muscle spasm      Recommended Treatment:   Order a one-time dose of lactulose for constipation  Continue with group therapy, milieu therapy and occupational therapy. Continue frequent safety checks and vitals per unit protocol. Case discussed with treatment team.  Risks, benefits and possible side effects of Medications: Risks, benefits, and possible side effects of medications have been explained to the patient, who verbalizes understanding    ------------------------------------------------------------    Subjective: Per nursing report, Samantha has been cooperative on the unit and compliant with medications. Today, Samantha is consenting for safety on the unit. Seen in the day room today, patient is noted to be more visible and is seen interacting with peers. No bowel movement reported x 3 days, despite MiraLAX as well as Senokot. We will order a one-time dose of lactulose to assist with constipation. No depression currently verbalized. Samantha expresses that anxiety is improving, and that she feels more settled here. Due to be discharged tomorrow, patient expresses excitement about returning home. No medication changes to be made at this time we will continue with current regimen.     Progress Toward Goals: slow improvement    Psychiatric Review of Systems:  Behavior over the last 24 hours: unchanged  Sleep: improving  Appetite: adequate  Medication side effects: none verbalized  ROS: Complete review of systems is negative except as noted above.     Vital signs in last 24 hours:  Temp:  [97.2 °F (36.2 °C)-97.8 °F (36.6 °C)] 97.8 °F (36.6 °C)  HR:  [56-70] 56  Resp:  [16-18] 18  BP: ()/(58-86) 98/58    Mental Status Exam:  Appearance:  alert, casually dressed and appropriate grooming and hygiene   Behavior:  calm and cooperative   Motor: no abnormal movements and normal gait and balance   Speech:  spontaneous and coherent   Mood:  anxious   Affect:  anxious   Thought Process:  goal directed   Thought Content: no verbalized delusions or overt paranoia   Perceptual disturbances: no reported hallucinations and does not appear to be responding to internal stimuli at this time   Risk Potential: No active or passive suicidal or homicidal ideation was verbalized during interview   Cognition: oriented to self and situation, appears to be of average intelligence and cognition not formally tested   Insight:  Improving   Judgment: Improving     Current Medications:  Current Facility-Administered Medications   Medication Dose Route Frequency Provider Last Rate   • amLODIPine  10 mg Oral Daily MERCY Cisneros     • aspirin  81 mg Oral Daily MERCY Cisneros     • atorvastatin  40 mg Oral Daily With [employer]     • bisacodyl  5 mg Oral Daily PRN Johanna Henry MD     • clonazePAM  0.5 mg Oral Daily Johanna Henry MD     • clonazePAM  1 mg Oral HS Johanna Henry MD     • Diclofenac Sodium  2 g Topical 4x Daily PRN MERCY Cisneros     • ergocalciferol  50,000 Units Oral Weekly MERCY Cisneros     • escitalopram  20 mg Oral Daily Johanna Henry MD     • ferrous sulfate  325 mg Oral Every Other Day MERCY Cisneros     • haloperidol lactate  5 mg Intramuscular Q4H PRN Max 4/day MERCY Patel     • hydrOXYzine HCL  25 mg Oral Q6H PRN Max 4/day MERCY Patel     • ibuprofen  200 mg Oral Q6H PRN MERCY Patel     • ibuprofen  400 mg Oral Q6H PRN Cordell Stairs, CRNP     • ibuprofen  600 mg Oral Q8H PRN Cordell Stairs, CRNP     • LORazepam  1 mg Intramuscular Q6H PRN Max 3/day Cordell Stairs, CRNP     • LORazepam  0.5 mg Oral Q6H PRN Max 4/day Cordell Stairs, CRNP     • LORazepam  1 mg Oral Q6H PRN Max 3/day Cordell Stairs, CRNP     • melatonin  3 mg Oral HS Donald Gates MD     • methocarbamol  500 mg Oral Q6H PRN Sandra Dixon, CRNP     • pantoprazole  40 mg Oral Early Morning Sandra NicoleFabian, CRNP     • polyethylene glycol  17 g Oral Daily PRN Cordell Stairs, CRNP     • pregabalin  100 mg Oral TID Donald Gates MD     • QUEtiapine  150 mg Oral HS Cordell Stairs, CRNP     • risperiDONE  0.25 mg Oral Q4H PRN Max 6/day Cordell Stairs, CRNP     • risperiDONE  0.5 mg Oral Q4H PRN Max 3/day Cordell Stairs, CRNP     • risperiDONE  1 mg Oral Q2H PRN Max 3/day Cordell Stairs, CRNP     • senna-docusate sodium  1 tablet Oral Daily PRN Cordell Stairs, CRNP     • traZODone  50 mg Oral HS PRN Cordell Stairs, CRNP     • Valbenazine Tosylate  40 mg Oral Daily Cordell Stairs, CRNP         Behavioral Health Medications: all current active meds have been reviewed. Changes as in plan section above. Laboratory results:  I have personally reviewed all pertinent laboratory/tests results. No results found for this or any previous visit (from the past 48 hour(s)). Judson Vo    This note was completed in part utilizing Dragon dictation Software. Grammatical, translation, syntax errors, random word insertions, spelling mistakes, and incomplete sentences may be an occasional consequence of this system secondary to software limitations with voice recognition, ambient noise, and hardware issues.  If you have any questions or concerns about the content, text, or information contained within the body of this dictation, please contact the provider for clarification

## 2023-09-24 NOTE — PLAN OF CARE
Problem: Anxiety  Goal: Anxiety is at manageable level  Description: Interventions:  - Assess and monitor patient's anxiety level. - Monitor for signs and symptoms (heart palpitations, chest pain, shortness of breath, headaches, nausea, feeling jumpy, restlessness, irritable, apprehensive). - Collaborate with interdisciplinary team and initiate plan and interventions as ordered.   - [place] patient to unit/surroundings  - Explain treatment plan  - Encourage participation in care  - Encourage verbalization of concerns/fears  - Identify coping mechanisms  - Assist in developing anxiety-reducing skills  - Administer/offer alternative therapies  - Limit or eliminate stimulants  Outcome: Progressing

## 2023-09-24 NOTE — NURSING NOTE
Patient is medication and meal compliant. Patient complained of a headache and requested medication for same. PRN Motrin given at 0919 with effectiveness noted and decrease in pain reported by patient. Patient also complained of constipation with no recorded BM for four days. PRN Senokot and one time order for Lactulose x 1 given with effectiveness noted and patient reporting she had a bowel movement today after PRN interventions. Patient denies depression and reports no acute anxiety concerns. Patient also denies SI, AH or VH. Patient is scheduled for discharge tomorrow at 1 PM with no new concerns or issues noted at this time. Will continue to monitor current patient status.

## 2023-09-25 VITALS
WEIGHT: 178 LBS | DIASTOLIC BLOOD PRESSURE: 88 MMHG | TEMPERATURE: 97.6 F | RESPIRATION RATE: 16 BRPM | SYSTOLIC BLOOD PRESSURE: 135 MMHG | OXYGEN SATURATION: 93 % | BODY MASS INDEX: 33.61 KG/M2 | HEART RATE: 68 BPM | HEIGHT: 61 IN

## 2023-09-25 PROCEDURE — 99238 HOSP IP/OBS DSCHRG MGMT 30/<: CPT | Performed by: STUDENT IN AN ORGANIZED HEALTH CARE EDUCATION/TRAINING PROGRAM

## 2023-09-25 RX ADMIN — FERROUS SULFATE TAB 325 MG (65 MG ELEMENTAL FE) 325 MG: 325 (65 FE) TAB at 08:51

## 2023-09-25 RX ADMIN — ASPIRIN 81 MG: 81 TABLET, COATED ORAL at 08:52

## 2023-09-25 RX ADMIN — ESCITALOPRAM OXALATE 20 MG: 10 TABLET ORAL at 08:51

## 2023-09-25 RX ADMIN — VALBENAZINE 40 MG: 40 CAPSULE ORAL at 08:52

## 2023-09-25 RX ADMIN — PREGABALIN 100 MG: 100 CAPSULE ORAL at 08:51

## 2023-09-25 RX ADMIN — PANTOPRAZOLE SODIUM 40 MG: 40 TABLET, DELAYED RELEASE ORAL at 05:49

## 2023-09-25 RX ADMIN — CLONAZEPAM 0.5 MG: 0.5 TABLET ORAL at 08:51

## 2023-09-25 RX ADMIN — AMLODIPINE BESYLATE 10 MG: 10 TABLET ORAL at 08:34

## 2023-09-25 NOTE — PROGRESS NOTES
09/25/23 1235   Means of Transportation   [facility] Application Mailed (Indicate date sent)  (7/13/36)     Application was emailed to [email or web address]

## 2023-09-25 NOTE — CASE MANAGEMENT
Faxed psych eval, med list, DCI, labs, and physician notes to  [address] [place] [address] at [address] [place] to [phone]

## 2023-09-25 NOTE — NURSING NOTE
The patient has been visible all evening. She is calm and cooperative with staff/peers. Patient endorsed some anxiety due to being discharged tomorrow. Denies depression, SI/HI/AVH. The patient was med compliant. No acting out behaviors. Safety checks ongoing.

## 2023-09-25 NOTE — NURSING NOTE
Pt pleasant and med-compliant with good appetite and steady gait with walker. VSS. No panic attacks noted. Pt expressed understanding of d/c meds and f/u instructions. Pt left unit with all her belongings and accompanied by staff to meet  in lobby for transport home at 1300. Monitored for safety and support.

## 2023-09-25 NOTE — PHYSICAL THERAPY NOTE
Physical Therapy Evaluation    Patient's Name: Samantha Toledo    Admitting Diagnosis  Panic disorder with agoraphobia and moderate panic attacks [F40.01]  Anxiety [F41.9]  Medical clearance for psychiatric admission [Z00.8]    Problem List  Patient Active Problem List   Diagnosis    Chronic pain disorder    Lumbar radiculopathy    Lumbar spondylosis    Fibromyalgia    Spinal stenosis of lumbar region with neurogenic claudication    Bipolar disorder (HCC)    Cognitive disorder    GERD without esophagitis    Generalized anxiety disorder    Essential hypertension    Insomnia    Major depressive disorder, recurrent episode, moderate degree (HCC)    Migraine    Overactive bladder    Panic disorder without agoraphobia    Post traumatic stress disorder    Tremor    Urinary incontinence    Vitamin D deficiency    Post laminectomy syndrome    MIMI (obstructive sleep apnea)    Tardive dyskinesia    Status post insertion of spinal cord stimulator    Ambulatory dysfunction    Dysphagia    Encephalopathy    History of CVA (cerebrovascular accident)    Medical clearance for psychiatric admission    Mixed hyperlipidemia    CVA (cerebral vascular accident) ([address])    Constipation    Hypokalemia    Anemia    Hemiplegia, post-stroke (HCC)    Memory loss    Fall    Witnessed seizure-like activity (HCC)    Stroke-like episode    Numbness    Muscle spasm    Bipolar I disorder, most recent episode depressed [facility])       Past Medical History  Past Medical History:   Diagnosis Date    Anxiety     Arthritis     left knee    Arthritis of right knee 10/6/2020    At risk for falls     Bipolar 2 disorder ([address])     FOLLOWS WITH PSYCHIATRIST.  CONTINUE LAMOTRIGINE; RESOLVED: [id number]    Depression     Familial tremor     both hands    Fibromyalgia     LAST ASSESSED: [id number]    GERD (gastroesophageal reflux disease)     Hearing aid worn     left ear    [place] (hard of hearing)     left ear    Hyperlipidemia     Hypertension     Left-sided weakness Lumbar disc disease with radiculopathy 2018    Memory loss of unknown cause     long and short term    Migraine     Multiple closed fractures of metatarsal bone of right foot 2021    Obesity     Obesity, Class II, BMI 35-39.9     Osteoarthritis of both hips 10/31/2016    Osteoarthritis of knee 2013    Description: Continue Tylenol and Naproxen. Encourage exercise and weight loss. Patient refused physical therapy. I will refer the patient back to Orthopedics. Overactive bladder     Panic attack     Patellofemoral disorder of both knees 2020    Post traumatic stress disorder     Primary localized osteoarthritis of both knees 2017    Primary osteoarthritis of both knees 2020    S/P insertion of spinal cord stimulator     no remote    S/P total knee arthroplasty, right 3/10/2022    Sacroiliitis ([address]) 2017    Seasonal allergies     Seizure-like activity ([address]) 6/3/2022    Seizures ([address])     possible seizure like activity    Small bowel obstruction ([address]) 3/24/2023    Status post total knee replacement, left 2022    Stroke [facility])     questionable stroke 2009    Tardive dyskinesia     PATIENT STATES    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   CONT ASA 81 MG DAILY; RESOLVED: [id number]    Urinary incontinence     Uses walker     Wears dentures     partial lower / full upper- doesnt wear    Wears glasses        Past Surgical History  Past Surgical History:   Procedure Laterality Date    BACK SURGERY       SECTION      COLONOSCOPY      RESOLVED: [id number]    EAR SURGERY      EGD      HYSTERECTOMY  2004    MYRINGOTOMY W/ TUBES Left     NECK SURGERY  2019    IA ARTHRP KNE CONDYLE&PLATU MEDIAL&LAT COMPARTMENTS Right 3/9/2022    Procedure: ARTHROPLASTY KNEE TOTAL;  Surgeon: Ophelia Llamas DO;  [facility];  Service: Orthopedics    IA ARTHRP KNE CONDYLE&PLATU MEDIAL&LAT COMPARTMENTS Left 2022    Procedure: ARTHROPLASTY KNEE TOTAL; Surgeon: Arnaud Ceja DO;  [facility];  Service: Orthopedics    GA CYSTOURETHROSCOPY N/A 2/18/2016    Procedure: CYSTOSCOPY, BOTOX INJECTION;  Surgeon: Sharmin Martínez MD;  Location: [facility];  Service: Gynecology    GA INSJ/RPLCMT SPI NPGR DIR/INDUXIVE COUPLING Right 2/10/2021    Procedure: REPLACEMENT IMPLANTABLE PULSE GENERATOR DORSAL SPINAL COLUMN STIMULATOR, RIGHT;  Surgeon: Delmy Lubin MD;  Location: [facility];  Service: Neurosurgery    GA PRQ [address] NSTIM ELECTRODE ARRAY EPIDURAL Right 7/28/2020    Procedure: INSERTION THORACIC DORSAL COLUMN SPINAL CORD STIMULATOR PERCUTANEOUS W IMPLANTABLE PULSE GENERATOR, RIGHT;  Surgeon: Delmy Lubin MD;  Location: [facility];  Service: Neurosurgery    TONSILLECTOMY      TUBAL LIGATION  1986    UPPER GASTROINTESTINAL ENDOSCOPY  09/2020       Recent Imaging  No orders to display       Recent Vital Signs  Vitals:    09/24/23 0743 09/24/23 1558 09/24/23 1600 09/24/23 2049   BP: 98/58 106/55 116/57 124/72   BP Location: Right arm Right arm Right arm Left arm   Pulse: 56 70 95 (!) 54   Resp: 18 17 17    Temp: 97.8 °F (36.6 °C) (!) 96.8 °F (36 °C) (!) 97.3 °F (36.3 °C) 97.5 °F (36.4 °C)   TempSrc: Temporal Temporal Temporal Temporal   SpO2: 98% 98% 98% 100%   Weight:       Height:            09/22/23 1345   PT Last Visit   PT Visit Date 09/22/23   Note Type   Note type Evaluation   Pain Assessment   Pain Assessment Tool 0-10   Pain Score No Pain   Restrictions/Precautions   Weight Bearing Precautions Per Order No   Home Living   Type of [facility]  Two level;Stairs to enter with rails   Home Equipment   (rollator)   Prior Function   Level of [place] Independent with ADLs; Independent with functional mobility   Lives With [address] in the last 6 months 1 to 4   General   Family/Caregiver Present No   Cognition   Arousal/Participation Alert   Orientation Level Oriented X4   Memory Within functional limits   Following Commands Follows all commands and directions without difficulty   RLE Assessment   RLE Assessment   (3+/5)   LLE Assessment   LLE Assessment   (3+/5)   Coordination   Movements are Fluid and Coordinated 0   Coordination and Movement Description forward flexed posture with decreased LE coordination   Sensation X   Light Touch   RLE Light Touch Impaired   LLE Light Touch Impaired   Bed Mobility   Supine to Sit 6  Modified independent   Sit to Supine 6  Modified independent   Transfers   Sit to Stand 6  Modified independent   Stand to Sit 6  Modified independent   Ambulation/Elevation   Gait pattern Step through pattern;Decreased toe off;Decreased heel strike; Short stride; Foward flexed;Decreased foot clearance   Gait Assistance 6  Modified independent   Assistive Device Rollabout   Distance 250ft   Balance   Static Sitting Fair +   Dynamic Sitting Fair +   Static Standing Fair   Dynamic Standing Fair -   Ambulatory Fair -   Endurance Deficit   Endurance Deficit Yes   Endurance Deficit Description back fatigue   Activity Tolerance   Activity Tolerance Patient tolerated treatment well   Medical Staff Made Aware spoke to CM   Nurse Made Aware spoke toRN   Assessment   Prognosis Good   Problem List Decreased range of motion;Decreased strength; Impaired balance;Decreased endurance;Decreased mobility; Decreased coordination; Impaired sensation   Barriers to Discharge Inaccessible home environment;Decreased caregiver support   Goals   Patient Goals go home and get home PT   Recommendation   PT Discharge Recommendation Home with home health rehabilitation   [address] walker   AM-PAC Basic Mobility Inpatient   Turning in Flat Bed Without Bedrails 4   Lying on Back to Sitting on Edge of Flat Bed Without Bedrails 4   Moving Bed to Chair 4   Standing Up From Chair Using Arms 4   Walk in Room 4   Climb 3-5 Stairs With Railing 3   Basic Mobility Inpatient Raw Score 23   Basic Mobility Standardized Score 50.88   Highest Level Of Mobility   -HLM Goal 7: Walk 25 feet or more   JH-HLM Achieved 8: Walk 250 feet ot more   End of Consult   Patient Position at End of Consult Standing           ASSESSMENT                                                                                                                     Samantha Yan is a 59 y.o. female admitted to [facility] on 9/14/2023 for Panic disorder without agoraphobia. Pt  has a past medical history of Anxiety, Arthritis, Arthritis of right knee (10/6/2020), At risk for falls, Bipolar 2 disorder ([address]), Depression, Familial tremor, Fibromyalgia, GERD (gastroesophageal reflux disease), Hearing aid worn, [place] (hard of hearing), Hyperlipidemia, Hypertension, Left-sided weakness, Lumbar disc disease with radiculopathy (2/2/2018), Memory loss of unknown cause, Migraine, Multiple closed fractures of metatarsal bone of right foot (5/2/2021), Obesity, Obesity, Class II, BMI 35-39.9, Osteoarthritis of both hips (10/31/2016), Osteoarthritis of knee (2/20/2013), Overactive bladder, Panic attack, Patellofemoral disorder of both knees (5/1/2020), Post traumatic stress disorder, Primary localized osteoarthritis of both knees (6/16/2017), Primary osteoarthritis of both knees (5/1/2020), S/P insertion of spinal cord stimulator, S/P total knee arthroplasty, right (3/10/2022), Sacroiliitis ([address]) (2/28/2017), Seasonal allergies, Seizure-like activity ([address]) (6/3/2022), Seizures ([address]), Small bowel obstruction ([address]) (3/24/2023), Status post total knee replacement, left (7/5/2022), Stroke ([address]), Tardive dyskinesia, Thrombosis of cerebral arteries, Urinary incontinence, Uses walker, Wears dentures, and Wears glasses. . PT was consulted and pt was seen on 9/25/2023 for mobility assessment and d/c planning.   Impairments limiting pt at this time include decreased ROM, impaired balance, decreased endurance, decreased coordination, decreased IADLS, decreased activity tolerance, decreased sensation, and decreased strength. Pt is currently functioning at a modified independent assistance level for bed mobility, modified independent assistance level for transfers, modified independent assistance level for ambulation with Rolling Walker. The patient's AM-PAC Basic Mobility Inpatient Short Form Raw Score is 23. A Raw score of greater than 16 suggests the patient may benefit from discharge to home. Please also refer to the recommendation of the Physical Therapist for safe discharge planning.     Recommendations                                                                                                                DME: Rolling Walker    Discharge Disposition:  Home with [address] PT, DPT

## 2023-09-25 NOTE — BH TRANSITION RECORD
Contact Information: If you have any questions, concerns, pended studies, tests and/or procedures, or emergencies regarding your inpatient behavioral health visit. Please contact [facility] older adult behavioral health unit [phone] and ask to speak to a , nurse or physician. A contact is available 24 hours/ 7 days a week at this number. Summary of Procedures Performed During your Stay:  Below is a list of major procedures performed during your hospital stay and a summary of results:    - Cardiac Procedures/Studies: EKG.      EKG:  Component Ref Range & Units 9/14/23 0118 9/11/23 1912 8/16/23 1724 7/19/23 0012 7/18/23 2235 7/18/23 2154 7/17/23 1218   Ventricular Rate BPM 67  73  79  72  78  88  80    Atrial Rate BPM 67  73  86  72  78  88  80    [place] Interval ms 142  140  116  150  144  132  138    QRSD Interval ms 100  94  86  100  94  92  96    QT Interval ms 438  424  354  400  400  382  348    QTC Interval ms 462  467  405  438  456  462  401    P Axis degrees 64  37  38  65  76  76  72    QRS Axis degrees 74  90  119  79  105  108  94    T Wave Axis degrees 51  17  16  16  29  17  49           WBC   Date Value Ref Range Status   09/15/2023 3.88 (L) 4.31 - 10.16 Thousand/uL Final   10/26/2015 4.50 4.31 - 10.16 Thousand/uL Final     WBC, UA   Date Value Ref Range Status   09/14/2023 2-4 None Seen, 0-1, 1-2, 0-5, 2-4 /hpf Final   02/18/2014 None Seen None Seen,2-4 /hpf Final     MCV   Date Value Ref Range Status   09/15/2023 92 82 - 98 fL Final   10/26/2015 87 82 - 98 fL Final     Lab Results   Component Value Date    BUN 16 09/15/2023    SODIUM 139 09/15/2023    CO2 26 09/15/2023     Lab Results   Component Value Date    ALKPHOS 78 09/15/2023     Lab Results   Component Value Date    CKMB 4.8 07/01/2021     No results found for: "TSH"  INR   Date Value Ref Range Status   07/18/2023 0.95 0.84 - 1.19 Final   03/23/2023 0.93 0.84 - 1.19 Final   06/28/2022 1.00 0.84 - 1.19 Final     No results found for: "APTT"  No results found for: "PHENO"  Lithium Lvl   Date Value Ref Range Status   03/23/2023 <0.1 (L) 0.6 - 1.2 mmol/L Final     Sodium   Date Value Ref Range Status   09/15/2023 139 135 - 147 mmol/L Final   07/27/2015 137 136 - 145 mmol/L Final     BUN   Date Value Ref Range Status   09/15/2023 16 5 - 25 mg/dL Final   07/27/2015 6 5 - 25 mg/dL Final     Creatinine   Date Value Ref Range Status   09/15/2023 0.80 0.60 - 1.30 mg/dL Final     Comment:     Standardized to IDMS reference method   07/27/2015 0.75 0.60 - 1.30 mg/dL Final     Comment:     Standardized to IDMS reference method     TSH 3RD GENERATON   Date Value Ref Range Status   09/15/2023 1.189 0.450 - 4.500 uIU/mL Final     Comment:     The recommended reference ranges for TSH during pregnancy are as follows:   First trimester 0.1 to 2.5 uIU/mL   Second trimester  0.2 to 3.0 uIU/mL   Third trimester 0.3 to 3.0 uIU/m    Note: Normal ranges may not apply to patients who are transgender, non-binary, or whose legal sex, sex at birth, and gender identity differ. Adult TSH (3rd generation) reference range follows the recommended guidelines of the American [employer] Association, January, 2020.   05/04/2015 1.084 0.550 - 4.780 uIU/ML Final     Comment:     *Patients undergoing fluorescein dye angiography may retain small  amounts of fluorescein in the body for 48-72 hours post procedure. Samples containing fluorescein can produce falsely depressed TSH   values. If the patient had this procedure, a specimen should be  resubmitted post fluorescein clearance.   The recommended reference ranges for TSH during pregnancy are as  follows:  First trimester 0.1 to 2.5 uIU/mL  Second trimester  0.2 to 3.0 uIU/mL  Third trimester 0.3 to 3.0 uIU/mL  The above 1 analytes were performed by [facility]  [address] [place] [id number]       WBC   Date Value Ref Range Status   09/15/2023 3.88 (L) 4.31 - 10.16 Thousand/uL Final   10/26/2015 4.50 4.31 - 10.16 Thousand/uL Final     WBC, UA   Date Value Ref Range Status   09/14/2023 2-4 None Seen, 0-1, 1-2, 0-5, 2-4 /hpf Final   02/18/2014 None Seen None Seen,2-4 /hpf Final     No components found for: "B12"  Lab Results   Component Value Date    FOLATE 11.0 09/15/2023     Lab Results   Component Value Date    RPR Non-Reactive 06/29/2021       Imaging  No orders to display         Pending Studies (From admission, onward)    None        Please follow up on the above pending studies with your PCP and/or referring provider.

## 2023-09-25 NOTE — PROGRESS NOTES
09/25/23 1211   Discharge Planning   Living Arrangements Lives w/ Family members   Support Systems Self;Daughter;Private Caregivers; Son   Type of Current Residence Private residence   [address] Yes   Type of [address] health aide   Other Referral/Resources/Interventions Provided:   Referrals Provided: Crisis Hotline;Psych Rehab; Other (Specify);[facility]  David Espinoza)   Discharge Communications   Discharge planning discussed with: Patient and her Daughter Sally Nyhan of Choice Yes   IMM Given (Date): 09/25/23   IMM Given to: Patient   Transportation at Discharge? Yes   Transport at Discharge  Other (Comment)  (Lyft)   ETA of Transport 1300   Transport Service Arrived No   Accompanied by Alone   Contacts   Patient Contacts Tunde Rodriguez/Daughter   Relationship to Patient: Family   Contact Method Phone   Phone Number [phone]   Reason/Outcome Continuity of Care;Emergency Contact; Discharge Planning   [facility]ication Program   Would you like to participate in our [id number] service program?   No - Declined
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
## 2023-09-25 NOTE — CASE MANAGEMENT
Case Management Assessment & Discharge Planning Note    Patient name Brittny Pierre  [facility] 660/OABHU 006-80 MRN [id number]  : 1965 Date 2023       Current Admission Date: 2023  Current Admission Diagnosis:Panic disorder without agoraphobia   Patient Active Problem List    Diagnosis Date Noted   • Muscle spasm 09/15/2023   • Numbness 2023   • Stroke-like episode 2023   • Witnessed seizure-like activity ([address]) 2023   • Fall 2023   • Memory loss 2023   • Hemiplegia, post-stroke ([address]) 2022   • Anemia 2022   • Hypokalemia 2022   • Constipation 03/15/2022   • CVA (cerebral vascular accident) ([address]) 2022   • Mixed hyperlipidemia 2021   • Medical clearance for psychiatric admission 2021   • History of CVA (cerebrovascular accident) 2021   • Encephalopathy 2021   • Dysphagia 2020   • Ambulatory dysfunction 2020   • Status post insertion of spinal cord stimulator 2020   • Tardive dyskinesia 2020   • MIMI (obstructive sleep apnea)    • Post laminectomy syndrome 10/07/2019   • Bipolar I disorder, most recent episode depressed ([address]) 2019   • Spinal stenosis of lumbar region with neurogenic claudication 2018   • Lumbar radiculopathy 2017   • Chronic pain disorder 2017   • Lumbar spondylosis 10/31/2016   • Generalized anxiety disorder 10/26/2016   • Major depressive disorder, recurrent episode, moderate degree ([address]) 2016   • Bipolar disorder ([address]) 2015   • Panic disorder without agoraphobia 2015   • Post traumatic stress disorder 2015   • Tremor 2014   • Migraine 2014   • GERD without esophagitis 2014   • Cognitive disorder 2014   • Overactive bladder 2013   • Insomnia 2013   • Urinary incontinence 2012   • Vitamin D deficiency 2012   • Fibromyalgia 2012   • Essential hypertension 2012 LOS (days): 11  Geometric Mean LOS (GMLOS) (days):   Days to GMLOS:     OBJECTIVE:    Risk of Unplanned Readmission Score: 28.76         Current admission status: Inpatient Psych  Referral Reason: Psych    Preferred Pharmacy:   [facility], [facility]  [facility],[address] [facility]  Phone: [phone] Fax: [phone]    Primary Care Provider: Montse Leung DO    Primary Insurance: [facility] RE[facility]  Secondary Insurance:  [place] [employer] [place]    ASSESSMENT:  Nehal Marsh 13, [address] [facility] Representative - Daughter   Primary Phone: [phone] (Mobile)  Home Phone: [phone]                              Patient Information  Support Systems: Self, Daughter, Private Caregivers, Son  Type of Current Residence: Private residence  Living Arrangements: Lives w/ Family members         Current Home Health Care  Type of Current Home Care Services: Home health aide                       DISCHARGE DETAILS:    Discharge planning discussed with[de-identified] Patient and her Daughter Sally Nyhan of Choice: Yes                   Contacts  Patient Contacts: Brenda Vega Michael/Daughter  Relationship to Patient[de-identified] Family  Contact Method: Phone  Phone Number: [phone]  Reason/Outcome: Continuity of Care, Emergency Contact, Discharge Planning              Other Referral/Resources/Interventions Provided:  Referrals Provided[de-identified] Crisis Hotline, Psych Rehab, Other (Specify), [facility] David Olga)    Would you like to participate in our [facility] service program?  : No - Declined          Transport at Discharge : Other (Comment) (Lyft)                 Transport Service Arrived: No     Accompanied by: Alone     IMM Given (Date):: 09/25/23  IMM Given to[de-identified] Patient          CM met with PT for PT check in. PT was pleasant in conversation, reviewed discharge plan along with follow up care and supports, PT in agreement with all.  PT denies si/hi/ah/vh anxiety and depression. PT expressed gratitude. Reviewed IMM, PT declined right to appeal, feels she is ready for discharge and signed. Patient has been set up with [facility], they will be reaching out to her today. A Beth Almaguer application was submitted on the patient's behalf, awaiting determination.      Aftercare:   [facility] [address] - 9/27/23 @ 10 am with [facility] - reaching out on Monday to do the day program.

## 2023-09-25 NOTE — PROGRESS NOTES
09/25/23 0818   Team Meeting   Meeting Type Daily Rounds   Initial Conference Date 09/25/23   Next Conference Date 09/26/23   Team Members Present   Team Members Present Physician;Nurse;;Occupational Therapist;   Physician Team Member Dr Nai Erickson Team Member Gem Reddy, [address] Management Team Member Reji   Social Work Team Member Diogo MERCADO Team Member Agus Rolle   Patient/Family Present   Patient Present No   Patient's Family Present No     Discharge at 1 pm,

## 2023-09-26 ENCOUNTER — PATIENT OUTREACH (OUTPATIENT)
Dept: INTERNAL MEDICINE CLINIC | Facility: CLINIC | Age: 58
End: 2023-09-26

## 2023-09-26 ENCOUNTER — TELEPHONE (OUTPATIENT)
Dept: INTERNAL MEDICINE CLINIC | Facility: CLINIC | Age: 58
End: 2023-09-26

## 2023-09-26 NOTE — CASE MANAGEMENT
Patient called and advised that [employer]ani [employer] said they did not have an application sent to them in her name. This was emailed on 9/19/23 to them, called [employer] and spoke to [address] [place] [address] who advised I should actually scan it to their address directly from the printer since they need it in PDF format, advised that is the format that it was sent to them and he was unable to assist. He advised that the best bet would be to scan it to them AND mail it, so I scanned it to [email or web address]  and put it in the mail today to the address of [address] [place] [address], [address] [place] [address]. Called the patient at [phone] and left a message advising that I scanned the application and also put it in the mail.

## 2023-09-26 NOTE — PROGRESS NOTES
Outpatient Care Management Note:    Received ADT alert. Chart reviewed. Patient had inpatient behavioral health stay at [address] [place] [address] from 9/14/23-9/25/23 for panic disorder without agoraphobia. Patient was discharged home back to her daughter and son in [facility]. Patient to follow up with day program with [facility] and follow up with outpatient mental health provider with [facility] and has scheduled appointment Wednesday 9/27 @ 10 am with Eloina Beltran, [address] [place] [address]. Medication changes:    Duloxetine (Cymbalta) discontinued and started on Escitalopram (Lexapro) 20 mg daily. Seroquel switched to Seroquel  mg nightly    Klonopin adjusted to 0.5 mg (1 tablet) in the AM and 1 mg (2 tablets) in the PM and to be tapered off subsequently     Lyrica increased from 50 mg daily to three times a day for pain and anxiety     Lorazepam discontinued and patient on hydroxyzine 25 mg every 8 hours as needed for anxiety     Remeron discontinued     Patient maintained on Ingrezza 40 mg daily     I called and spoke with patient's daughter Tisha Raya at [phone]. I explained my role and reason for call. She recalls speaking with me in the past. She reports patient is doing well at this time and she and her  Shilpi Castillo are doing their best to provide the support she needs and help her with appointments and medications as much as she will allow them to help her. I reviewed medication changes above. Son in law went yesterday to pharmacy to get medication and they are waiting to get one more medication from pharmacy but unable to recall name. I did encourage use of weekly pill box and family helping to setup or getting setup with pharmacy that offers bubble packing. Daughter agrees and reports that when they are helping her manage her medication her anxiety seems better controlled.  Per daughter patient will hide medication and even when they had weekly pill box setup she got medication for the week mixed up that they had to dispense medication to her day by day and for those 2 months that they did that she was doing very well. Patient voices she wants to be independent but still requires support in many ways per family. I did review some pharmacies that offer bubble packing services. They will further discuss with patient. I encouraged bubble packing. I did review with daughter mental health appointment tomorrow at 10 am. I did review referral made for day program at [facility] and encouraged follow up. Will defer follow up with PCP office at this time. Patient also was referred for an ICM to [facility]CM and [facility] ICM per inpatient  note. Per daughter they will be moving beginning of October and patient will be coming with them and continuing to stay with them. They are going to finalize between 2 places this week. Encouraged to keep us updated regarding change of address for patient. Daughter aware that patient is on several housing lists and encouraged to call to check on where she is at on wait list and encouraged to update contact information and address. I did review that she could have her own housing in the future and keep waiver program and have an agency that provides aides to help her in the future. She reports her  Sherron Temple was going over this info with her. I did review Stephanie Brandt in [facility] with her. I did make her aware patient is out of rides with her insurance for the year and that she needed to reapply for [address] and  at the hospital helped her to reapply and that she is under 72 and will need to go for an in person evaluation. I did make her aware Medicaid funds the medical trips and she can have someone ride with her to appointments and trips can be setup up to the day before up to 4:30 pm and reminded her that it is shared riding. Daughter very appreciative of the outreach and time to go over information.  She does not have any further questions at this time and has my contact number [phone] PCP office number [phone] if needed. I did make her aware that  Darcy Parker will be further reaching out to follow up.

## 2023-09-26 NOTE — TELEPHONE ENCOUNTER
Patient called to request a script for physical therapy for her back. She wanted to get a status on her script for her Cymbalta that was suppose to be order on 9/12/23 but a Sig is needed.  Please contact patient on this issues,

## 2023-09-28 ENCOUNTER — PATIENT OUTREACH (OUTPATIENT)
Dept: INTERNAL MEDICINE CLINIC | Facility: CLINIC | Age: 58
End: 2023-09-28

## 2023-09-28 NOTE — PROGRESS NOTES
SW reached out to [employer] [phone] and spoke to Ligia Ahumada who shares pt's application was returned to pt due to an issue with her Proof of age. SW has Texted her INPT 5min Mediar Preedo and she has e-mailed SW a copy as well. SW to f/u re same.

## 2023-09-29 ENCOUNTER — PATIENT OUTREACH (OUTPATIENT)
Dept: INTERNAL MEDICINE CLINIC | Facility: CLINIC | Age: 58
End: 2023-09-29

## 2023-09-29 DIAGNOSIS — Z74.8 ASSISTANCE NEEDED WITH TRANSPORTATION: Primary | ICD-10-CM

## 2023-09-29 DIAGNOSIS — Z78.9 NEEDS ASSISTANCE WITH COMMUNITY RESOURCES: ICD-10-CM

## 2023-09-29 NOTE — TELEPHONE ENCOUNTER
Looked at [address] note from 9/26/23 patient is no longer on Cymbalta and is now on Lexapro. I spoke to Cherylene Mixer RN throught teams and she confirmed. Called and spoke to patient and made her aware as per note she understood. Patient did request a script for Tylenol and a PT referral for her back pain.       Please review and advise

## 2023-09-29 NOTE — PROGRESS NOTES
SW attempted to   Return call to pt who asked for help [facility] which has been returned to pt. Sw left message for pt to return SW call. SW also attempted  [phone]  # NOT IS SERVICE. SW also reached out to pt's son in law and Care Provider but had to leave a message for pt/family to return SW call. Sw also reached out to pt 's dgt Tunde [phone] but could not leave a message. Sw received a message from our clerical Team the pt was trying to return SW call. Sw return call to pt and she shared she spoke to Waandrea Crowder and they did  Not accept her Proof of Age but she could not say what was wrong with it. Sw also reached out to pt's dgt Agustina Simmonds and she said she did not get to open the enc=velope yet but when she gets home she will  let Sw know what the issue is. Most likely we need to re-submit the copy we have of the application with a better copy of her Proof of Age. Sw has reviewed with pt and dgt the pt will need to go to a FACE to Face evaluation once the application is accepted and it may take a few weeks before we know if she is approved. Sw asked how she can get to her upcoming appointments especially her MH ? Dgt shares the her Brother  ( Pt's son ) Spencer Anderson maybe able to accompany pt via and UBER to them. Sw reviewed with dgt pt has been referred to   [facility] and for an ICM. Pt's Eldar Laurie Vicente from [address] shares applications sent to [facility] and [facility]. CM Team to f/u and assist pt with transportation and her OP MH and medical  f/u as able. Sw to ask [address] to f/u with pt's L[place] application as needed.

## 2023-10-01 DIAGNOSIS — M54.50 CHRONIC LOW BACK PAIN, UNSPECIFIED BACK PAIN LATERALITY, UNSPECIFIED WHETHER SCIATICA PRESENT: Primary | ICD-10-CM

## 2023-10-01 DIAGNOSIS — G89.29 CHRONIC LOW BACK PAIN, UNSPECIFIED BACK PAIN LATERALITY, UNSPECIFIED WHETHER SCIATICA PRESENT: Primary | ICD-10-CM

## 2023-10-01 RX ORDER — ACETAMINOPHEN 500 MG
500 TABLET ORAL EVERY 8 HOURS PRN
Qty: 180 TABLET | Refills: 1 | Status: SHIPPED | OUTPATIENT
Start: 2023-10-01

## 2023-10-02 ENCOUNTER — PATIENT OUTREACH (OUTPATIENT)
Dept: INTERNAL MEDICINE CLINIC | Facility: CLINIC | Age: 58
End: 2023-10-02

## 2023-10-02 NOTE — PROGRESS NOTES
SW has received several Incoming call from pt inquiring about her [employer] applications to let us know she has moved and she is temporally staying with her son Kofi Wallis @  [address] [place] [address] [address] [place] [address]. She also shares very unfortunately he has tested positive for COVID. She share she is negative. SW has updates [employer] who is hand delivering pt's [employer] Application today to their Office. CM Team to f/u and assist as indicated.

## 2023-10-03 ENCOUNTER — PATIENT OUTREACH (OUTPATIENT)
Dept: INTERNAL MEDICINE CLINIC | Facility: CLINIC | Age: 58
End: 2023-10-03

## 2023-10-03 NOTE — PROGRESS NOTES
SW has returned call to pt who wanted to know her Supports Coordinator's # to change her address so her aides can be paid and to set up her non medical rides. HIRAM shares the # we have  for [place] [phone]  [email or web address]  [employer]   Pt to f/u. In addition she shares that her [facility] Rober Face to face is scheduled for 10/11/23 @ 9 AM.    CM Team to f/u and assist as indicated.

## 2023-10-06 ENCOUNTER — PATIENT OUTREACH (OUTPATIENT)
Dept: INTERNAL MEDICINE CLINIC | Facility: CLINIC | Age: 58
End: 2023-10-06

## 2023-10-06 NOTE — PROGRESS NOTES
SW has reached out to pt to return her call . Pt shares she has spoken with her  and does have the # for NON MEDICAL rides . Moisés reminded her that she told SW her Maciel Parisi evaluation is on 10/11/23 @ 9 AM.    Pt now was not sure of the date now but will call Etta Back to check. She has also been in touch with [employer] re her SNAP but needs to call them back. She is updating her address sn expenses with them. She has updated her address with her  and her son in law will continue to come to help her. Pt to f/u with Sw as needed.

## 2023-10-06 NOTE — PROGRESS NOTES
Chart Review    SW Cheryl Parker required [facility] to bring [employer]'s application in person. [facility] went in person to leave a copy of Marshalelver Dio's application since [facility] has had difficulties receiving the applications via secured email.     CMOC handed it to the  Cheryl Parker. Application scanned in chart for futures needs.

## 2023-10-11 ENCOUNTER — PATIENT OUTREACH (OUTPATIENT)
Dept: INTERNAL MEDICINE CLINIC | Facility: CLINIC | Age: 58
End: 2023-10-11

## 2023-10-11 ENCOUNTER — TELEPHONE (OUTPATIENT)
Dept: INTERNAL MEDICINE CLINIC | Facility: CLINIC | Age: 58
End: 2023-10-11

## 2023-10-11 DIAGNOSIS — I10 HYPERTENSION, UNSPECIFIED TYPE: ICD-10-CM

## 2023-10-11 RX ORDER — AMLODIPINE BESYLATE 10 MG/1
10 TABLET ORAL DAILY
Qty: 30 TABLET | Refills: 0 | Status: ON HOLD | OUTPATIENT
Start: 2023-10-11 | End: 2023-11-10

## 2023-10-11 NOTE — TELEPHONE ENCOUNTER
Patient called stating that while she was moving she lost her amlodipine medication. Per patient, she has not taken her medication in almost 2 weeks and is getting a headache.  Patient is asking for a new script be sent over to her pharmacy

## 2023-10-11 NOTE — PROGRESS NOTES
Pt called to tell SW that she went to her [facility] Face to Face evaluation today and she thinks it went well. She will know in a few days. Support offered. CM team to f/u and assist as indicated.

## 2023-10-12 NOTE — TELEPHONE ENCOUNTER
Pt called office stating pharmacy said refill is too soon so pt is requesting provider call insurance for prior auth so they will pay for the medication early

## 2023-10-12 NOTE — TELEPHONE ENCOUNTER
Called and spoke to Tana at insurance to get override. Override approved. Called pharmacy and received a paid claim.     Left detailed message on patients VM advising of approval.

## 2023-10-18 ENCOUNTER — HOSPITAL ENCOUNTER (EMERGENCY)
Facility: HOSPITAL | Age: 58
End: 2023-10-18
Attending: EMERGENCY MEDICINE
Payer: MEDICARE

## 2023-10-18 VITALS
DIASTOLIC BLOOD PRESSURE: 85 MMHG | SYSTOLIC BLOOD PRESSURE: 130 MMHG | BODY MASS INDEX: 31.91 KG/M2 | OXYGEN SATURATION: 95 % | WEIGHT: 168.87 LBS | TEMPERATURE: 98 F | HEART RATE: 65 BPM | RESPIRATION RATE: 20 BRPM

## 2023-10-18 DIAGNOSIS — F41.9 ANXIETY: Primary | ICD-10-CM

## 2023-10-18 DIAGNOSIS — M54.9 BACK PAIN: ICD-10-CM

## 2023-10-18 LAB
ALBUMIN SERPL BCP-MCNC: 4.3 G/DL (ref 3.5–5)
ALP SERPL-CCNC: 102 U/L (ref 34–104)
ALT SERPL W P-5'-P-CCNC: 27 U/L (ref 7–52)
AMPHETAMINES SERPL QL SCN: NEGATIVE
ANION GAP SERPL CALCULATED.3IONS-SCNC: 11 MMOL/L
APAP SERPL-MCNC: <10 UG/ML (ref 10–20)
AST SERPL W P-5'-P-CCNC: 24 U/L (ref 13–39)
ATRIAL RATE: 66 BPM
BACTERIA UR QL AUTO: ABNORMAL /HPF
BARBITURATES UR QL: NEGATIVE
BASOPHILS # BLD AUTO: 0.03 THOUSANDS/ÂΜL (ref 0–0.1)
BASOPHILS NFR BLD AUTO: 1 % (ref 0–1)
BENZODIAZ UR QL: NEGATIVE
BILIRUB SERPL-MCNC: 0.76 MG/DL (ref 0.2–1)
BILIRUB UR QL STRIP: NEGATIVE
BUN SERPL-MCNC: 7 MG/DL (ref 5–25)
CALCIUM SERPL-MCNC: 9 MG/DL (ref 8.4–10.2)
CHLORIDE SERPL-SCNC: 103 MMOL/L (ref 96–108)
CLARITY UR: CLEAR
CO2 SERPL-SCNC: 25 MMOL/L (ref 21–32)
COCAINE UR QL: NEGATIVE
COLOR UR: ABNORMAL
CREAT SERPL-MCNC: 0.73 MG/DL (ref 0.6–1.3)
EOSINOPHIL # BLD AUTO: 0.17 THOUSAND/ÂΜL (ref 0–0.61)
EOSINOPHIL NFR BLD AUTO: 4 % (ref 0–6)
ERYTHROCYTE [DISTWIDTH] IN BLOOD BY AUTOMATED COUNT: 13.5 % (ref 11.6–15.1)
ETHANOL SERPL-MCNC: <10 MG/DL
GFR SERPL CREATININE-BSD FRML MDRD: 90 ML/MIN/1.73SQ M
GLUCOSE SERPL-MCNC: 118 MG/DL (ref 65–140)
GLUCOSE UR STRIP-MCNC: NEGATIVE MG/DL
HCT VFR BLD AUTO: 41.4 % (ref 34.8–46.1)
HGB BLD-MCNC: 13.2 G/DL (ref 11.5–15.4)
HGB UR QL STRIP.AUTO: NEGATIVE
IMM GRANULOCYTES # BLD AUTO: 0.02 THOUSAND/UL (ref 0–0.2)
IMM GRANULOCYTES NFR BLD AUTO: 1 % (ref 0–2)
KETONES UR STRIP-MCNC: NEGATIVE MG/DL
LEUKOCYTE ESTERASE UR QL STRIP: 500
LYMPHOCYTES # BLD AUTO: 1.59 THOUSANDS/ÂΜL (ref 0.6–4.47)
LYMPHOCYTES NFR BLD AUTO: 40 % (ref 14–44)
MAGNESIUM SERPL-MCNC: 2 MG/DL (ref 1.9–2.7)
MCH RBC QN AUTO: 28.7 PG (ref 26.8–34.3)
MCHC RBC AUTO-ENTMCNC: 31.9 G/DL (ref 31.4–37.4)
MCV RBC AUTO: 90 FL (ref 82–98)
METHADONE UR QL: NEGATIVE
MONOCYTES # BLD AUTO: 0.33 THOUSAND/ÂΜL (ref 0.17–1.22)
MONOCYTES NFR BLD AUTO: 8 % (ref 4–12)
NEUTROPHILS # BLD AUTO: 1.86 THOUSANDS/ÂΜL (ref 1.85–7.62)
NEUTS SEG NFR BLD AUTO: 46 % (ref 43–75)
NITRITE UR QL STRIP: NEGATIVE
NON-SQ EPI CELLS URNS QL MICRO: ABNORMAL /HPF
NRBC BLD AUTO-RTO: 0 /100 WBCS
OPIATES UR QL SCN: NEGATIVE
OXYCODONE+OXYMORPHONE UR QL SCN: NEGATIVE
P AXIS: 70 DEGREES
PCP UR QL: NEGATIVE
PH UR STRIP.AUTO: 8 [PH]
PLATELET # BLD AUTO: 289 THOUSANDS/UL (ref 149–390)
PMV BLD AUTO: 9.5 FL (ref 8.9–12.7)
POTASSIUM SERPL-SCNC: 4 MMOL/L (ref 3.5–5.3)
PR INTERVAL: 150 MS
PROT SERPL-MCNC: 6.2 G/DL (ref 6.4–8.4)
PROT UR STRIP-MCNC: NEGATIVE MG/DL
QRS AXIS: 76 DEGREES
QRSD INTERVAL: 98 MS
QT INTERVAL: 428 MS
QTC INTERVAL: 448 MS
RBC # BLD AUTO: 4.6 MILLION/UL (ref 3.81–5.12)
RBC #/AREA URNS AUTO: ABNORMAL /HPF
SALICYLATES SERPL-MCNC: <5 MG/DL (ref 3–20)
SODIUM SERPL-SCNC: 139 MMOL/L (ref 135–147)
SP GR UR STRIP.AUTO: 1.01 (ref 1–1.04)
T WAVE AXIS: 57 DEGREES
THC UR QL: NEGATIVE
TSH SERPL DL<=0.05 MIU/L-ACNC: 0.77 UIU/ML (ref 0.45–4.5)
UROBILINOGEN UA: NEGATIVE MG/DL
VENTRICULAR RATE: 66 BPM
WBC # BLD AUTO: 4 THOUSAND/UL (ref 4.31–10.16)
WBC #/AREA URNS AUTO: ABNORMAL /HPF

## 2023-10-18 PROCEDURE — 81001 URINALYSIS AUTO W/SCOPE: CPT

## 2023-10-18 PROCEDURE — 80143 DRUG ASSAY ACETAMINOPHEN: CPT

## 2023-10-18 PROCEDURE — 36415 COLL VENOUS BLD VENIPUNCTURE: CPT

## 2023-10-18 PROCEDURE — 82077 ASSAY SPEC XCP UR&BREATH IA: CPT

## 2023-10-18 PROCEDURE — 80179 DRUG ASSAY SALICYLATE: CPT

## 2023-10-18 PROCEDURE — 96372 THER/PROPH/DIAG INJ SC/IM: CPT

## 2023-10-18 PROCEDURE — 85025 COMPLETE CBC W/AUTO DIFF WBC: CPT

## 2023-10-18 PROCEDURE — 93005 ELECTROCARDIOGRAM TRACING: CPT

## 2023-10-18 PROCEDURE — 81003 URINALYSIS AUTO W/O SCOPE: CPT

## 2023-10-18 PROCEDURE — 99285 EMERGENCY DEPT VISIT HI MDM: CPT

## 2023-10-18 PROCEDURE — 99284 EMERGENCY DEPT VISIT MOD MDM: CPT

## 2023-10-18 PROCEDURE — 83735 ASSAY OF MAGNESIUM: CPT

## 2023-10-18 PROCEDURE — 80307 DRUG TEST PRSMV CHEM ANLYZR: CPT

## 2023-10-18 PROCEDURE — 84443 ASSAY THYROID STIM HORMONE: CPT | Performed by: EMERGENCY MEDICINE

## 2023-10-18 PROCEDURE — 87086 URINE CULTURE/COLONY COUNT: CPT

## 2023-10-18 PROCEDURE — 80053 COMPREHEN METABOLIC PANEL: CPT

## 2023-10-18 PROCEDURE — 99244 OFF/OP CNSLTJ NEW/EST MOD 40: CPT | Performed by: PSYCHIATRY & NEUROLOGY

## 2023-10-18 PROCEDURE — 93010 ELECTROCARDIOGRAM REPORT: CPT | Performed by: INTERNAL MEDICINE

## 2023-10-18 RX ORDER — LANOLIN ALCOHOL/MO/W.PET/CERES
3 CREAM (GRAM) TOPICAL
Status: CANCELLED | OUTPATIENT
Start: 2023-10-18

## 2023-10-18 RX ORDER — HYDROXYZINE HYDROCHLORIDE 25 MG/1
25 TABLET, FILM COATED ORAL
Status: CANCELLED | OUTPATIENT
Start: 2023-10-18

## 2023-10-18 RX ORDER — CLONAZEPAM 0.5 MG/1
0.5 TABLET ORAL 2 TIMES DAILY
Status: CANCELLED | OUTPATIENT
Start: 2023-10-18

## 2023-10-18 RX ORDER — HALOPERIDOL 5 MG/ML
5 INJECTION INTRAMUSCULAR
Status: CANCELLED | OUTPATIENT
Start: 2023-10-18

## 2023-10-18 RX ORDER — LIDOCAINE 50 MG/G
1 PATCH TOPICAL ONCE
Status: COMPLETED | OUTPATIENT
Start: 2023-10-18 | End: 2023-10-18

## 2023-10-18 RX ORDER — ESCITALOPRAM OXALATE 10 MG/1
20 TABLET ORAL DAILY
Status: CANCELLED | OUTPATIENT
Start: 2023-10-19

## 2023-10-18 RX ORDER — LORAZEPAM 1 MG/1
1 TABLET ORAL ONCE
Status: COMPLETED | OUTPATIENT
Start: 2023-10-18 | End: 2023-10-18

## 2023-10-18 RX ORDER — PREGABALIN 100 MG/1
100 CAPSULE ORAL 3 TIMES DAILY
Status: CANCELLED | OUTPATIENT
Start: 2023-10-18

## 2023-10-18 RX ORDER — LANOLIN ALCOHOL/MO/W.PET/CERES
3 CREAM (GRAM) TOPICAL
Status: DISCONTINUED | OUTPATIENT
Start: 2023-10-18 | End: 2023-10-19 | Stop reason: HOSPADM

## 2023-10-18 RX ORDER — RISPERIDONE 1 MG/1
1 TABLET ORAL
Status: CANCELLED | OUTPATIENT
Start: 2023-10-18

## 2023-10-18 RX ORDER — CLONAZEPAM 0.5 MG/1
1 TABLET ORAL
Status: DISCONTINUED | OUTPATIENT
Start: 2023-10-18 | End: 2023-10-19 | Stop reason: HOSPADM

## 2023-10-18 RX ORDER — RISPERIDONE 0.25 MG/1
0.25 TABLET ORAL
Status: CANCELLED | OUTPATIENT
Start: 2023-10-18

## 2023-10-18 RX ORDER — HYDROXYZINE HYDROCHLORIDE 25 MG/1
25 TABLET, FILM COATED ORAL EVERY 8 HOURS PRN
Status: DISCONTINUED | OUTPATIENT
Start: 2023-10-18 | End: 2023-10-19 | Stop reason: HOSPADM

## 2023-10-18 RX ORDER — QUETIAPINE 150 MG/1
150 TABLET, FILM COATED, EXTENDED RELEASE ORAL
Status: CANCELLED | OUTPATIENT
Start: 2023-10-18

## 2023-10-18 RX ORDER — PANTOPRAZOLE SODIUM 40 MG/1
40 TABLET, DELAYED RELEASE ORAL
Status: DISCONTINUED | OUTPATIENT
Start: 2023-10-18 | End: 2023-10-19 | Stop reason: HOSPADM

## 2023-10-18 RX ORDER — CLONAZEPAM 0.5 MG/1
0.5 TABLET ORAL DAILY
Status: DISCONTINUED | OUTPATIENT
Start: 2023-10-18 | End: 2023-10-19 | Stop reason: HOSPADM

## 2023-10-18 RX ORDER — PANTOPRAZOLE SODIUM 40 MG/1
40 TABLET, DELAYED RELEASE ORAL
Status: CANCELLED | OUTPATIENT
Start: 2023-10-19

## 2023-10-18 RX ORDER — LANOLIN ALCOHOL/MO/W.PET/CERES
3 CREAM (GRAM) TOPICAL
Status: CANCELLED | OUTPATIENT
Start: 2023-10-18 | End: 2023-10-22

## 2023-10-18 RX ORDER — ATORVASTATIN CALCIUM 20 MG/1
40 TABLET, FILM COATED ORAL DAILY
Status: DISCONTINUED | OUTPATIENT
Start: 2023-10-18 | End: 2023-10-19 | Stop reason: HOSPADM

## 2023-10-18 RX ORDER — ACETAMINOPHEN 325 MG/1
650 TABLET ORAL EVERY 6 HOURS PRN
Status: DISCONTINUED | OUTPATIENT
Start: 2023-10-18 | End: 2023-10-19 | Stop reason: HOSPADM

## 2023-10-18 RX ORDER — LORAZEPAM 2 MG/ML
1 INJECTION INTRAMUSCULAR
Status: CANCELLED | OUTPATIENT
Start: 2023-10-18

## 2023-10-18 RX ORDER — PREGABALIN 100 MG/1
100 CAPSULE ORAL 3 TIMES DAILY
Status: DISCONTINUED | OUTPATIENT
Start: 2023-10-18 | End: 2023-10-19 | Stop reason: HOSPADM

## 2023-10-18 RX ORDER — RISPERIDONE 0.25 MG/1
0.5 TABLET ORAL
Status: CANCELLED | OUTPATIENT
Start: 2023-10-18

## 2023-10-18 RX ORDER — AMLODIPINE BESYLATE 5 MG/1
10 TABLET ORAL DAILY
Status: CANCELLED | OUTPATIENT
Start: 2023-10-19

## 2023-10-18 RX ORDER — ESCITALOPRAM OXALATE 10 MG/1
20 TABLET ORAL DAILY
Status: DISCONTINUED | OUTPATIENT
Start: 2023-10-18 | End: 2023-10-19 | Stop reason: HOSPADM

## 2023-10-18 RX ORDER — QUETIAPINE 150 MG/1
150 TABLET, FILM COATED, EXTENDED RELEASE ORAL
Status: DISCONTINUED | OUTPATIENT
Start: 2023-10-18 | End: 2023-10-19 | Stop reason: HOSPADM

## 2023-10-18 RX ORDER — AMLODIPINE BESYLATE 5 MG/1
10 TABLET ORAL DAILY
Status: DISCONTINUED | OUTPATIENT
Start: 2023-10-18 | End: 2023-10-19 | Stop reason: HOSPADM

## 2023-10-18 RX ORDER — KETOROLAC TROMETHAMINE 30 MG/ML
15 INJECTION, SOLUTION INTRAMUSCULAR; INTRAVENOUS ONCE
Status: COMPLETED | OUTPATIENT
Start: 2023-10-18 | End: 2023-10-18

## 2023-10-18 RX ORDER — ATORVASTATIN CALCIUM 20 MG/1
40 TABLET, FILM COATED ORAL DAILY
Status: CANCELLED | OUTPATIENT
Start: 2023-10-19

## 2023-10-18 RX ORDER — MAGNESIUM HYDROXIDE/ALUMINUM HYDROXICE/SIMETHICONE 120; 1200; 1200 MG/30ML; MG/30ML; MG/30ML
30 SUSPENSION ORAL EVERY 4 HOURS PRN
Status: CANCELLED | OUTPATIENT
Start: 2023-10-18

## 2023-10-18 RX ORDER — FERROUS SULFATE 325(65) MG
325 TABLET ORAL EVERY OTHER DAY
Status: DISCONTINUED | OUTPATIENT
Start: 2023-10-18 | End: 2023-10-19 | Stop reason: HOSPADM

## 2023-10-18 RX ADMIN — PREGABALIN 100 MG: 100 CAPSULE ORAL at 22:10

## 2023-10-18 RX ADMIN — ESCITALOPRAM OXALATE 20 MG: 10 TABLET ORAL at 09:02

## 2023-10-18 RX ADMIN — ASPIRIN 81 MG: 81 TABLET, COATED ORAL at 09:02

## 2023-10-18 RX ADMIN — KETOROLAC TROMETHAMINE 15 MG: 30 INJECTION, SOLUTION INTRAMUSCULAR; INTRAVENOUS at 18:48

## 2023-10-18 RX ADMIN — MELATONIN TAB 3 MG 3 MG: 3 TAB at 22:10

## 2023-10-18 RX ADMIN — ATORVASTATIN CALCIUM 40 MG: 20 TABLET, FILM COATED ORAL at 09:02

## 2023-10-18 RX ADMIN — LORAZEPAM 1 MG: 1 TABLET ORAL at 02:29

## 2023-10-18 RX ADMIN — QUETIAPINE FUMARATE 150 MG: 150 TABLET, EXTENDED RELEASE ORAL at 22:51

## 2023-10-18 RX ADMIN — FERROUS SULFATE TAB 325 MG (65 MG ELEMENTAL FE) 325 MG: 325 (65 FE) TAB at 09:02

## 2023-10-18 RX ADMIN — CLONAZEPAM 1 MG: 0.5 TABLET ORAL at 22:10

## 2023-10-18 RX ADMIN — ACETAMINOPHEN 650 MG: 325 TABLET ORAL at 09:57

## 2023-10-18 RX ADMIN — CLONAZEPAM 0.5 MG: 0.5 TABLET ORAL at 09:02

## 2023-10-18 RX ADMIN — PREGABALIN 100 MG: 100 CAPSULE ORAL at 09:02

## 2023-10-18 RX ADMIN — LIDOCAINE 1 PATCH: 700 PATCH TOPICAL at 02:06

## 2023-10-18 RX ADMIN — PANTOPRAZOLE SODIUM 40 MG: 40 TABLET, DELAYED RELEASE ORAL at 06:00

## 2023-10-18 RX ADMIN — LIDOCAINE 1 PATCH: 700 PATCH TOPICAL at 02:34

## 2023-10-18 RX ADMIN — PREGABALIN 100 MG: 100 CAPSULE ORAL at 17:45

## 2023-10-18 RX ADMIN — AMLODIPINE BESYLATE 10 MG: 5 TABLET ORAL at 09:02
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
## 2023-10-18 NOTE — ED NOTES
Insurance   Eligibility Verification System checked - ([phone]).   Online system / automated system indicates[id number]

## 2023-10-18 NOTE — ED NOTES
patient arrived in the ED, after experiencing panic attacks at home. She is presenting as very depressed and anxious, disheveled, tartive dyskinesa with difficulty understanding her speech. She is noticably anxious and tearful, feeling helpless and hopeless. Patient was evaluated by psychiatry and signed a 201     Patient was admitted [address] on 9/14-9/25/23. She is follow at the  Mental health clinic [address].  She is referred to the [facility] rehab program but is on the waiting list.

## 2023-10-18 NOTE — ED NOTES
Pt has personal cellphone and tablet at bedside.  Provider made aware      Jonatan De León RN  10/18/23 1199

## 2023-10-18 NOTE — ED NOTES
Pt asked if family could bring in her valbenazine tosylate and pt stated, "I don't think so"     Linda Nielson, RN  10/18/23 6498

## 2023-10-18 NOTE — ED PROVIDER NOTES
History  Chief Complaint   Patient presents with    Panic Attack     Patient reports that she has been having panic attacks and would like to be admitted upstairs to [facility] for a few days. Denies suicidal or homicidal ideations and has no hallucinations. Also c/o low back pain which is ongoing. Patient is a 59year old female coming in by way of EMS for panic attacks. Has a history of being hospitalized for this in the past. Would like to sign herself in. Also complaining of back pain. No red flags symptoms      Panic Attack      Prior to Admission Medications   Prescriptions Last Dose Informant Patient Reported? Taking? Aspirin Low Dose 81 MG EC tablet   No No   Sig: TAKE 1 TABLET BY MOUTH EVERY DAY   Diclofenac Sodium (VOLTAREN) 1 %   No No   Sig: Apply 2 g topically 4 (four) times a day   Ingrezza 40 MG CAPS   No No   Sig: Take 40 mg by mouth in the morning   QUEtiapine (SEROquel XR) 150 mg 24 hr tablet   No No   Sig: Take 1 tablet (150 mg total) by mouth daily at bedtime   acetaminophen (TYLENOL) 500 mg tablet   No No   Sig: Take 1 tablet (500 mg total) by mouth every 8 (eight) hours as needed for mild pain   amLODIPine (NORVASC) 10 mg tablet   No No   Sig: Take 1 tablet (10 mg total) by mouth daily   atorvastatin (LIPITOR) 40 mg tablet   No No   Sig: TAKE 1 TABLET BY MOUTH EVERY DAY   clonazePAM (KlonoPIN) 0.5 mg tablet   No No   Sig: Take 1 tablet in the morning and 2 tablets at night   escitalopram (LEXAPRO) 20 mg tablet   No No   Sig: Take 1 tablet (20 mg total) by mouth daily Do not start before September 23, 2023.    ferrous sulfate 324 (65 Fe) mg   No No   Sig: Take 1 tablet (324 mg total) by mouth every other day   hydrOXYzine HCL (ATARAX) 25 mg tablet   No No   Sig: Take 1 tablet (25 mg total) by mouth every 8 (eight) hours as needed for anxiety (panic attacks)   melatonin 3 mg   No No   Sig: Take 1 tablet (3 mg total) by mouth daily at bedtime   methocarbamol (ROBAXIN) 500 mg tablet   No No   Sig: Take 1 tablet (500 mg total) by mouth 2 (two) times a day as needed for muscle spasms   pantoprazole (PROTONIX) 40 mg tablet   No No   Sig: Take 1 tablet (40 mg total) by mouth daily in the early morning Do not start before September 23, 2023. pregabalin (LYRICA) 100 mg capsule   No No   Sig: Take 1 capsule (100 mg total) by mouth 3 (three) times a day      Facility-Administered Medications: None       Past Medical History:   Diagnosis Date    Anxiety     Arthritis     left knee    Arthritis of right knee 10/6/2020    At risk for falls     Bipolar 2 disorder (HCC)     FOLLOWS WITH PSYCHIATRIST. CONTINUE LAMOTRIGINE; RESOLVED: [id number]    Depression     Familial tremor     both hands    Fibromyalgia     LAST ASSESSED: [id number]    GERD (gastroesophageal reflux disease)     Hearing aid worn     left ear    [place] (hard of hearing)     left ear    Hyperlipidemia     Hypertension     Left-sided weakness     Lumbar disc disease with radiculopathy 2/2/2018    Memory loss of unknown cause     long and short term    Migraine     Multiple closed fractures of metatarsal bone of right foot 5/2/2021    Obesity     Obesity, Class II, BMI 35-39.9     Osteoarthritis of both hips 10/31/2016    Osteoarthritis of knee 2/20/2013    Description: Continue Tylenol and Naproxen. Encourage exercise and weight loss. Patient refused physical therapy. I will refer the patient back to Orthopedics.     Overactive bladder     Panic attack     Patellofemoral disorder of both knees 5/1/2020    Post traumatic stress disorder     Primary localized osteoarthritis of both knees 6/16/2017    Primary osteoarthritis of both knees 5/1/2020    S/P insertion of spinal cord stimulator     no remote    S/P total knee arthroplasty, right 3/10/2022    Sacroiliitis ([address]) 2/28/2017    Seasonal allergies     Seizure-like activity ([address]) 6/3/2022    Seizures ([address])     possible seizure like activity    Small bowel obstruction ([address]) 3/24/2023    Status post total knee replacement, left 2022    Stroke [facility])     questionable stroke 2009    Tardive dyskinesia     PATIENT STATES    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   CONT ASA 81 MG DAILY; RESOLVED: [id number]    Urinary incontinence     Uses walker     Wears dentures     partial lower / full upper- doesnt wear    Wears glasses        Past Surgical History:   Procedure Laterality Date    BACK SURGERY       SECTION      COLONOSCOPY      RESOLVED: [id number]    EAR SURGERY      EGD      HYSTERECTOMY  2004    MYRINGOTOMY W/ TUBES Left     NECK SURGERY  2019    MD ARTHRP KNE CONDYLE&PLATU MEDIAL&LAT COMPARTMENTS Right 3/9/2022    Procedure: ARTHROPLASTY KNEE TOTAL;  Surgeon: Arnaud Ceja DO;  [facility]: [facility];  Service: Orthopedics    MD ARTHRP KNE CONDYLE&PLATU MEDIAL&LAT COMPARTMENTS Left 2022    Procedure: ARTHROPLASTY KNEE TOTAL;  Surgeon: Arnaud Ceja DO;  Location: [facility];  Service: Orthopedics    MD CYSTOURETHROSCOPY N/A 2016    Procedure: CYSTOSCOPY, BOTOX INJECTION;  Surgeon: Sharmin Martínez MD;  Location: [facility];  Service: Gynecology    MD INSJ/RPLCMT SPI NPGR DIR/INDUXIVE COUPLING Right 2/10/2021    Procedure: REPLACEMENT IMPLANTABLE PULSE GENERATOR DORSAL SPINAL COLUMN STIMULATOR, RIGHT;  Surgeon: Delmy Lubin MD;  Location: [facility];  Service: Neurosurgery    MD PRQ IMPLTJ NSTIM ELECTRODE ARRAY EPIDURAL Right 2020    Procedure: INSERTION THORACIC DORSAL COLUMN SPINAL CORD STIMULATOR PERCUTANEOUS W IMPLANTABLE PULSE GENERATOR, RIGHT;  Surgeon: Delmy Lubin MD;  Location: [facility];  Service: Neurosurgery    TONSILLECTOMY      TUBAL LIGATION      UPPER GASTROINTESTINAL ENDOSCOPY  2020       Family History   Problem Relation Age of Onset    Colon cancer Mother     Alzheimer's disease Father     Stroke Father     Colon cancer Brother     Bipolar disorder Brother     Breast cancer Maternal Aunt     Colon cancer Maternal Aunt Heart disease Other     Diabetes Other     Hypertension Other     Seizures Son     Depression Paternal Grandfather     No Known Problems Sister     No Known Problems Brother     Thyroid disease Neg Hx      I have reviewed and agree with the history as documented. E-Cigarette/Vaping    E-Cigarette Use Never User      E-Cigarette/Vaping Substances    Nicotine No     THC No     CBD No     Flavoring No     Other No     Unknown No      Social History     Tobacco Use    Smoking status: Never    Smokeless tobacco: Never   Vaping Use    Vaping Use: Never used   Substance Use Topics    Alcohol use: Not Currently     Comment: 2 x year; being a social drinker as per all scripts     Drug use: No       Review of Systems   Genitourinary:  Negative for difficulty urinating. Musculoskeletal:  Positive for back pain. Psychiatric/Behavioral:  Negative for sleep disturbance. Physical Exam  Physical Exam  Vitals reviewed. Constitutional:       Appearance: Normal appearance. She is normal weight. HENT:      Head: Normocephalic and atraumatic. Right Ear: External ear normal.      Left Ear: External ear normal.      Nose: Nose normal.   Eyes:      Conjunctiva/sclera: Conjunctivae normal.   Cardiovascular:      Rate and Rhythm: Normal rate. Pulmonary:      Effort: Pulmonary effort is normal.   Musculoskeletal:         General: Normal range of motion. Cervical back: Normal range of motion. Skin:     General: Skin is warm and dry. Neurological:      Mental Status: She is alert.          Vital Signs  ED Triage Vitals   Temperature Pulse Respirations Blood Pressure SpO2   10/18/23 0132 10/18/23 0132 10/18/23 0132 10/18/23 0132 10/18/23 0132   97.8 °F (36.6 °C) 65 16 112/68 98 %      Temp Source Heart Rate Source Patient Position - Orthostatic VS BP Location FiO2 (%)   10/18/23 0132 10/18/23 0132 10/18/23 0132 10/18/23 0132 --   Tympanic Monitor Lying Left arm       Pain Score       10/18/23 0130       8 Vitals:    10/18/23 0132   BP: 112/68   Pulse: 65   Patient Position - Orthostatic VS: Lying         Visual Acuity      ED Medications  Medications   lidocaine (LIDODERM) 5 % patch 1 patch (1 patch Topical Patch Removed 10/18/23 1406)   lidocaine (LIDODERM) 5 % patch 1 patch (1 patch Topical Medication Applied 10/18/23 0234)   amLODIPine (NORVASC) tablet 10 mg (has no administration in time range)   aspirin (ECOTRIN LOW STRENGTH) EC tablet 81 mg (has no administration in time range)   atorvastatin (LIPITOR) tablet 40 mg (has no administration in time range)   clonazePAM (KlonoPIN) tablet 0.5 mg (has no administration in time range)   clonazePAM (KlonoPIN) tablet 1 mg (has no administration in time range)   escitalopram (LEXAPRO) tablet 20 mg (has no administration in time range)   ferrous sulfate tablet 325 mg (has no administration in time range)   hydrOXYzine HCL (ATARAX) tablet 25 mg (has no administration in time range)   Valbenazine Tosylate CAPS 40 mg (has no administration in time range)   melatonin tablet 3 mg (has no administration in time range)   pantoprazole (PROTONIX) EC tablet 40 mg (40 mg Oral Given 10/18/23 0600)   pregabalin (LYRICA) capsule 100 mg (has no administration in time range)   QUEtiapine (SEROquel XR) 24 hr tablet 150 mg (has no administration in time range)   LORazepam (ATIVAN) tablet 1 mg (1 mg Oral Given 10/18/23 0229)       Diagnostic Studies  Results Reviewed       Procedure Component Value Units Date/Time    Comprehensive metabolic panel [[id number]]  (Abnormal) Collected: 10/18/23 0205    Lab Status: Final result Specimen: Blood from Arm, Right Updated: 10/18/23 0233     Sodium 139 mmol/L      Potassium 4.0 mmol/L      Chloride 103 mmol/L      CO2 25 mmol/L      ANION GAP 11 mmol/L      BUN 7 mg/dL      Creatinine 0.73 mg/dL      Glucose 118 mg/dL      Calcium 9.0 mg/dL      AST 24 U/L      ALT 27 U/L      Alkaline Phosphatase 102 U/L      Total Protein 6.2 g/dL      Albumin 4.3 g/dL      Total Bilirubin 0.76 mg/dL      eGFR 90 ml/min/1.73sq m     Narrative:      [facility] ([facility]D):     Stage 1 with normal or high GFR (GFR > 90 mL/min/1.73 square meters)    Stage 2 Mild CKD (GFR = 60-89 mL/min/1.73 square meters)    Stage 3A Moderate CKD (GFR = 45-59 mL/min/1.73 square meters)    Stage 3B Moderate CKD (GFR = 30-44 mL/min/1.73 square meters)    Stage 4 Severe CKD (GFR = 15-29 mL/min/1.73 square meters)    Stage 5 End Stage CKD (GFR <15 mL/min/1.73 square meters)  Note: GFR calculation is accurate only with a steady state creatinine    Magnesium [[id number]]  (Normal) Collected: 10/18/23 0205    Lab Status: Final result Specimen: Blood from Arm, Right Updated: 10/18/23 0233     Magnesium 2.0 mg/dL     Salicylate level [[id number]]  (Normal) Collected: 10/18/23 0205    Lab Status: Final result Specimen: Blood from Arm, Right Updated: 78/83/22 3978     Salicylate Lvl <5 mg/dL     Acetaminophen level-If concentration is detectable, please discuss with medical  on call. [[id number]]  (Abnormal) Collected: 10/18/23 0205    Lab Status: Final result Specimen: Blood from Arm, Right Updated: 10/18/23 0233     Acetaminophen Level <10 ug/mL     Ethanol [[id number]]  (Normal) Collected: 10/18/23 0205    Lab Status: Final result Specimen: Blood from Arm, Right Updated: 10/18/23 0230     Ethanol Lvl <10 mg/dL     Urine Microscopic [[id number]]  (Abnormal) Collected: 10/18/23 0205    Lab Status: Final result Specimen: Urine, Clean Catch Updated: 10/18/23 0230     RBC, UA None Seen /hpf      WBC, UA 10-20 /hpf      Epithelial Cells Occasional /hpf      Bacteria, UA Occasional /hpf     Urine culture [[id number]] Collected: 10/18/23 0205    Lab Status:  In process Specimen: Urine, Clean Catch Updated: 10/18/23 0230    Rapid drug screen, urine [[id number]]  (Normal) Collected: 10/18/23 0205    Lab Status: Final result Specimen: Urine, Clean Catch Updated: 10/18/23 0227     Amph/Meth UR Negative     Barbiturate Ur Negative     Benzodiazepine Urine Negative     Cocaine Urine Negative     Methadone Urine Negative     Opiate Urine Negative     PCP Ur Negative     THC Urine Negative     Oxycodone Urine Negative    Narrative:      FOR MEDICAL PURPOSES ONLY. IF CONFIRMATION NEEDED PLEASE CONTACT THE LAB WITHIN 5 DAYS.     Drug Screen Cutoff Levels:  AMPHETAMINE/METHAMPHETAMINES  1000 ng/mL  BARBITURATES     200 ng/mL  BENZODIAZEPINES     200 ng/mL  COCAINE      300 ng/mL  METHADONE      300 ng/mL  OPIATES      300 ng/mL  PHENCYCLIDINE     25 ng/mL  THC       50 ng/mL  OXYCODONE      100 ng/mL    UA w Reflex to Microscopic w Reflex to Culture [[id number]]  (Abnormal) Collected: 10/18/23 0205    Lab Status: Final result Specimen: Urine, Clean Catch Updated: 10/18/23 0219     Color, UA Straw     Clarity, UA Clear     Specific Gravity, UA 1.015     pH, UA 8.0     Leukocytes, .0     Nitrite, UA Negative     Protein, UA Negative mg/dl      Glucose, UA Negative mg/dl      Ketones, UA Negative mg/dl      Bilirubin, UA Negative     Occult Blood, UA Negative     UROBILINOGEN UA Negative mg/dL     CBC and differential [[id number]]  (Abnormal) Collected: 10/18/23 0205    Lab Status: Final result Specimen: Blood from Arm, Right Updated: 10/18/23 0214     WBC 4.00 Thousand/uL      RBC 4.60 Million/uL      Hemoglobin 13.2 g/dL      Hematocrit 41.4 %      MCV 90 fL      MCH 28.7 pg      MCHC 31.9 g/dL      RDW 13.5 %      MPV 9.5 fL      Platelets 676 Thousands/uL      nRBC 0 /100 WBCs      Neutrophils Relative 46 %      Immat GRANS % 1 %      Lymphocytes Relative 40 %      Monocytes Relative 8 %      Eosinophils Relative 4 %      Basophils Relative 1 %      Neutrophils Absolute 1.86 Thousands/µL      Immature Grans Absolute 0.02 Thousand/uL      Lymphocytes Absolute 1.59 Thousands/µL      Monocytes Absolute 0.33 Thousand/µL      Eosinophils Absolute 0.17 Thousand/µL Basophils Absolute 0.03 Thousands/µL                    No orders to display              Procedures  Procedures         ED Course                               SBIRT 20yo+      Flowsheet Row Most Recent Value   Initial Alcohol Screen: US AUDIT-C     1. How often do you have a drink containing alcohol? 0 Filed at: 10/18/2023 0129   2. How many drinks containing alcohol do you have on a typical day you are drinking? 0 Filed at: 10/18/2023 0129   3a. Male UNDER 65: How often do you have five or more drinks on one occasion? 0 Filed at: 10/18/2023 0129   3b. FEMALE Any Age, or MALE 65+: How often do you have 4 or more drinks on one occassion? 0 Filed at: 10/18/2023 0129   Audit-C Score 0 Filed at: 10/18/2023 0129   CAMRON: How many times in the past year have you. .. Used an illegal drug or used a prescription medication for non-medical reasons? Never Filed at: 10/18/2023 0129                      Medical Decision Making  Patient comes in requesting inpatient mental health treatment. Had significant anxiety. Given ativan, and was sleeping when crisis attempted to see. Complicated history. Psych to see in the morning    Amount and/or Complexity of Data Reviewed  Labs: ordered. Risk  OTC drugs. Prescription drug management. Decision regarding hospitalization.              Disposition  Final diagnoses:   Anxiety   Back pain     Time reflects when diagnosis was documented in both MDM as applicable and the Disposition within this note       Time User Action Codes Description Comment    10/18/2023  2:36 AM Tigre Villavicencio Add [F41.9] Anxiety     10/18/2023  2:37 AM Tigre Villavicencio Add [M54.9] Back pain           ED Disposition       ED Disposition   Transfer to [address] [place] [address]   --    Date/Time   Wed Oct 18, 2023 0236    Comment   Samantha CRISTIANO [place] is medically clear for psychiatric evaluation               Follow-up Information    None         Patient's Medications   Discharge Prescriptions    No medications on file       No discharge procedures on file.     PDMP Review         Value Time User    PDMP Reviewed  Yes 9/22/2023 10:32 AM Carey Reddy MD            ED Provider  Electronically Signed by             Ami Benson PA-C  10/18/23 0355

## 2023-10-19 ENCOUNTER — HOSPITAL ENCOUNTER (INPATIENT)
Facility: HOSPITAL | Age: 58
LOS: 22 days | Discharge: HOME | DRG: 880 | End: 2023-11-10
Attending: PSYCHIATRY & NEUROLOGY | Admitting: PSYCHIATRY & NEUROLOGY
Payer: MEDICARE

## 2023-10-19 DIAGNOSIS — F51.01 PRIMARY INSOMNIA: ICD-10-CM

## 2023-10-19 DIAGNOSIS — G89.4 CHRONIC PAIN DISORDER: ICD-10-CM

## 2023-10-19 DIAGNOSIS — F41.9 ANXIETY: ICD-10-CM

## 2023-10-19 DIAGNOSIS — G24.01 TARDIVE DYSKINESIA: ICD-10-CM

## 2023-10-19 DIAGNOSIS — F43.10 POST TRAUMATIC STRESS DISORDER: Chronic | ICD-10-CM

## 2023-10-19 DIAGNOSIS — R20.0 NUMBNESS: ICD-10-CM

## 2023-10-19 DIAGNOSIS — M79.7 FIBROMYALGIA: ICD-10-CM

## 2023-10-19 DIAGNOSIS — I10 HYPERTENSION: ICD-10-CM

## 2023-10-19 DIAGNOSIS — J30.9 ALLERGIC RHINITIS: ICD-10-CM

## 2023-10-19 DIAGNOSIS — Z86.73 HISTORY OF CVA (CEREBROVASCULAR ACCIDENT): ICD-10-CM

## 2023-10-19 DIAGNOSIS — E55.9 VITAMIN D DEFICIENCY: ICD-10-CM

## 2023-10-19 DIAGNOSIS — F32.A DEPRESSION: ICD-10-CM

## 2023-10-19 DIAGNOSIS — F31.30 BIPOLAR I DISORDER, MOST RECENT EPISODE DEPRESSED (HCC): Primary | ICD-10-CM

## 2023-10-19 DIAGNOSIS — E53.8 VITAMIN B12 DEFICIENCY: ICD-10-CM

## 2023-10-19 DIAGNOSIS — K21.9 GERD WITHOUT ESOPHAGITIS: ICD-10-CM

## 2023-10-19 DIAGNOSIS — R63.5 WEIGHT GAIN: ICD-10-CM

## 2023-10-19 DIAGNOSIS — M17.12 PRIMARY OSTEOARTHRITIS OF LEFT KNEE: ICD-10-CM

## 2023-10-19 DIAGNOSIS — F41.0 GENERALIZED ANXIETY DISORDER WITH PANIC ATTACKS: ICD-10-CM

## 2023-10-19 DIAGNOSIS — F41.1 GENERALIZED ANXIETY DISORDER WITH PANIC ATTACKS: ICD-10-CM

## 2023-10-19 LAB
25(OH)D3 SERPL-MCNC: 18.4 NG/ML (ref 30–100)
ANION GAP SERPL CALCULATED.3IONS-SCNC: 8 MMOL/L
BACTERIA UR CULT: NORMAL
BASOPHILS # BLD AUTO: 0.03 THOUSANDS/ÂΜL (ref 0–0.1)
BASOPHILS NFR BLD AUTO: 1 % (ref 0–1)
BUN SERPL-MCNC: 11 MG/DL (ref 5–25)
CALCIUM SERPL-MCNC: 8.7 MG/DL (ref 8.4–10.2)
CHLORIDE SERPL-SCNC: 103 MMOL/L (ref 96–108)
CHOLEST SERPL-MCNC: 153 MG/DL
CO2 SERPL-SCNC: 28 MMOL/L (ref 21–32)
CREAT SERPL-MCNC: 0.66 MG/DL (ref 0.6–1.3)
EOSINOPHIL # BLD AUTO: 0.15 THOUSAND/ÂΜL (ref 0–0.61)
EOSINOPHIL NFR BLD AUTO: 4 % (ref 0–6)
ERYTHROCYTE [DISTWIDTH] IN BLOOD BY AUTOMATED COUNT: 13.4 % (ref 11.6–15.1)
FOLATE SERPL-MCNC: 9.1 NG/ML
GFR SERPL CREATININE-BSD FRML MDRD: 97 ML/MIN/1.73SQ M
GLUCOSE P FAST SERPL-MCNC: 86 MG/DL (ref 65–99)
GLUCOSE SERPL-MCNC: 86 MG/DL (ref 65–140)
HCT VFR BLD AUTO: 38.6 % (ref 34.8–46.1)
HDLC SERPL-MCNC: 63 MG/DL
HGB BLD-MCNC: 12.9 G/DL (ref 11.5–15.4)
IMM GRANULOCYTES # BLD AUTO: 0.01 THOUSAND/UL (ref 0–0.2)
IMM GRANULOCYTES NFR BLD AUTO: 0 % (ref 0–2)
LDLC SERPL CALC-MCNC: 76 MG/DL (ref 0–100)
LYMPHOCYTES # BLD AUTO: 1.74 THOUSANDS/ÂΜL (ref 0.6–4.47)
LYMPHOCYTES NFR BLD AUTO: 51 % (ref 14–44)
MCH RBC QN AUTO: 31.6 PG (ref 26.8–34.3)
MCHC RBC AUTO-ENTMCNC: 33.4 G/DL (ref 31.4–37.4)
MCV RBC AUTO: 95 FL (ref 82–98)
MONOCYTES # BLD AUTO: 0.29 THOUSAND/ÂΜL (ref 0.17–1.22)
MONOCYTES NFR BLD AUTO: 9 % (ref 4–12)
NEUTROPHILS # BLD AUTO: 1.17 THOUSANDS/ÂΜL (ref 1.85–7.62)
NEUTS SEG NFR BLD AUTO: 35 % (ref 43–75)
NONHDLC SERPL-MCNC: 90 MG/DL
NRBC BLD AUTO-RTO: 0 /100 WBCS
PLATELET # BLD AUTO: 291 THOUSANDS/UL (ref 149–390)
PMV BLD AUTO: 9.7 FL (ref 8.9–12.7)
POTASSIUM SERPL-SCNC: 3.6 MMOL/L (ref 3.5–5.3)
RBC # BLD AUTO: 4.08 MILLION/UL (ref 3.81–5.12)
SODIUM SERPL-SCNC: 139 MMOL/L (ref 135–147)
TRIGL SERPL-MCNC: 70 MG/DL
VIT B12 SERPL-MCNC: 408 PG/ML (ref 180–914)
WBC # BLD AUTO: 3.39 THOUSAND/UL (ref 4.31–10.16)

## 2023-10-19 PROCEDURE — 80048 BASIC METABOLIC PNL TOTAL CA: CPT | Performed by: PSYCHIATRY & NEUROLOGY

## 2023-10-19 PROCEDURE — 80061 LIPID PANEL: CPT | Performed by: PSYCHIATRY & NEUROLOGY

## 2023-10-19 PROCEDURE — 82746 ASSAY OF FOLIC ACID SERUM: CPT | Performed by: PSYCHIATRY & NEUROLOGY

## 2023-10-19 PROCEDURE — 85025 COMPLETE CBC W/AUTO DIFF WBC: CPT | Performed by: PSYCHIATRY & NEUROLOGY

## 2023-10-19 PROCEDURE — 82607 VITAMIN B-12: CPT | Performed by: PSYCHIATRY & NEUROLOGY

## 2023-10-19 PROCEDURE — 99223 1ST HOSP IP/OBS HIGH 75: CPT | Performed by: PSYCHIATRY & NEUROLOGY

## 2023-10-19 PROCEDURE — 82306 VITAMIN D 25 HYDROXY: CPT | Performed by: PSYCHIATRY & NEUROLOGY

## 2023-10-19 RX ORDER — FERROUS SULFATE 325(65) MG
325 TABLET ORAL EVERY OTHER DAY
Status: DISCONTINUED | OUTPATIENT
Start: 2023-10-20 | End: 2023-11-10 | Stop reason: HOSPADM

## 2023-10-19 RX ORDER — ESCITALOPRAM OXALATE 10 MG/1
20 TABLET ORAL DAILY
Status: DISCONTINUED | OUTPATIENT
Start: 2023-10-19 | End: 2023-10-19

## 2023-10-19 RX ORDER — QUETIAPINE 150 MG/1
150 TABLET, FILM COATED, EXTENDED RELEASE ORAL
Status: DISCONTINUED | OUTPATIENT
Start: 2023-10-19 | End: 2023-10-19

## 2023-10-19 RX ORDER — LORAZEPAM 2 MG/ML
1 INJECTION INTRAMUSCULAR
Status: DISCONTINUED | OUTPATIENT
Start: 2023-10-19 | End: 2023-11-10 | Stop reason: HOSPADM

## 2023-10-19 RX ORDER — HYDROXYZINE HYDROCHLORIDE 25 MG/1
25 TABLET, FILM COATED ORAL
Status: DISCONTINUED | OUTPATIENT
Start: 2023-10-19 | End: 2023-11-10 | Stop reason: HOSPADM

## 2023-10-19 RX ORDER — CLONAZEPAM 0.5 MG/1
0.5 TABLET ORAL 2 TIMES DAILY
Status: DISCONTINUED | OUTPATIENT
Start: 2023-10-19 | End: 2023-10-19

## 2023-10-19 RX ORDER — CLONAZEPAM 0.5 MG/1
0.5 TABLET ORAL DAILY
Status: DISCONTINUED | OUTPATIENT
Start: 2023-10-20 | End: 2023-10-23

## 2023-10-19 RX ORDER — LANOLIN ALCOHOL/MO/W.PET/CERES
6 CREAM (GRAM) TOPICAL
Status: DISCONTINUED | OUTPATIENT
Start: 2023-10-19 | End: 2023-11-10 | Stop reason: HOSPADM

## 2023-10-19 RX ORDER — POLYETHYLENE GLYCOL 3350 17 G/17G
17 POWDER, FOR SOLUTION ORAL DAILY PRN
Status: DISCONTINUED | OUTPATIENT
Start: 2023-10-19 | End: 2023-11-10 | Stop reason: HOSPADM

## 2023-10-19 RX ORDER — TRAMADOL HYDROCHLORIDE 50 MG/1
50 TABLET ORAL EVERY 8 HOURS PRN
Status: DISCONTINUED | OUTPATIENT
Start: 2023-10-19 | End: 2023-10-21

## 2023-10-19 RX ORDER — MAGNESIUM HYDROXIDE/ALUMINUM HYDROXICE/SIMETHICONE 120; 1200; 1200 MG/30ML; MG/30ML; MG/30ML
30 SUSPENSION ORAL EVERY 4 HOURS PRN
Status: DISCONTINUED | OUTPATIENT
Start: 2023-10-19 | End: 2023-11-10 | Stop reason: HOSPADM

## 2023-10-19 RX ORDER — RISPERIDONE 0.25 MG/1
0.25 TABLET ORAL
Status: DISCONTINUED | OUTPATIENT
Start: 2023-10-19 | End: 2023-11-10 | Stop reason: HOSPADM

## 2023-10-19 RX ORDER — HALOPERIDOL 5 MG/ML
5 INJECTION INTRAMUSCULAR
Status: DISCONTINUED | OUTPATIENT
Start: 2023-10-19 | End: 2023-11-10 | Stop reason: HOSPADM

## 2023-10-19 RX ORDER — RISPERIDONE 1 MG/1
1 TABLET ORAL
Status: DISCONTINUED | OUTPATIENT
Start: 2023-10-19 | End: 2023-11-10 | Stop reason: HOSPADM

## 2023-10-19 RX ORDER — PANTOPRAZOLE SODIUM 40 MG/1
40 TABLET, DELAYED RELEASE ORAL
Status: DISCONTINUED | OUTPATIENT
Start: 2023-10-19 | End: 2023-11-10 | Stop reason: HOSPADM

## 2023-10-19 RX ORDER — RISPERIDONE 0.5 MG/1
0.5 TABLET ORAL
Status: DISCONTINUED | OUTPATIENT
Start: 2023-10-19 | End: 2023-11-10 | Stop reason: HOSPADM

## 2023-10-19 RX ORDER — LANOLIN ALCOHOL/MO/W.PET/CERES
3 CREAM (GRAM) TOPICAL
Status: DISCONTINUED | OUTPATIENT
Start: 2023-10-19 | End: 2023-10-19

## 2023-10-19 RX ORDER — ATORVASTATIN CALCIUM 40 MG/1
40 TABLET, FILM COATED ORAL
Status: DISCONTINUED | OUTPATIENT
Start: 2023-10-19 | End: 2023-11-10 | Stop reason: HOSPADM

## 2023-10-19 RX ORDER — ACETAMINOPHEN 325 MG/1
650 TABLET ORAL EVERY 4 HOURS PRN
Status: DISCONTINUED | OUTPATIENT
Start: 2023-10-19 | End: 2023-11-10 | Stop reason: HOSPADM

## 2023-10-19 RX ORDER — ACETAMINOPHEN 325 MG/1
975 TABLET ORAL EVERY 6 HOURS PRN
Status: DISCONTINUED | OUTPATIENT
Start: 2023-10-19 | End: 2023-11-10 | Stop reason: HOSPADM

## 2023-10-19 RX ORDER — PREGABALIN 100 MG/1
100 CAPSULE ORAL 3 TIMES DAILY
Status: DISCONTINUED | OUTPATIENT
Start: 2023-10-19 | End: 2023-11-10 | Stop reason: HOSPADM

## 2023-10-19 RX ORDER — CLONAZEPAM 1 MG/1
1 TABLET ORAL
Status: DISCONTINUED | OUTPATIENT
Start: 2023-10-19 | End: 2023-11-06

## 2023-10-19 RX ORDER — AMLODIPINE BESYLATE 10 MG/1
10 TABLET ORAL DAILY
Status: DISCONTINUED | OUTPATIENT
Start: 2023-10-19 | End: 2023-11-08

## 2023-10-19 RX ADMIN — QUETIAPINE 200 MG: 50 TABLET, FILM COATED, EXTENDED RELEASE ORAL at 21:10

## 2023-10-19 RX ADMIN — AMLODIPINE BESYLATE 10 MG: 10 TABLET ORAL at 08:47

## 2023-10-19 RX ADMIN — CLONAZEPAM 0.5 MG: 0.5 TABLET ORAL at 08:47

## 2023-10-19 RX ADMIN — Medication 6 MG: at 21:12

## 2023-10-19 RX ADMIN — ATORVASTATIN CALCIUM 40 MG: 40 TABLET, FILM COATED ORAL at 17:04

## 2023-10-19 RX ADMIN — QUETIAPINE FUMARATE 150 MG: 150 TABLET, EXTENDED RELEASE ORAL at 02:01

## 2023-10-19 RX ADMIN — Medication 3 MG: at 02:02

## 2023-10-19 RX ADMIN — PREGABALIN 100 MG: 100 CAPSULE ORAL at 21:11

## 2023-10-19 RX ADMIN — CLONAZEPAM 1 MG: 1 TABLET ORAL at 21:11

## 2023-10-19 RX ADMIN — ESCITALOPRAM OXALATE 20 MG: 10 TABLET ORAL at 08:47

## 2023-10-19 RX ADMIN — PREGABALIN 100 MG: 100 CAPSULE ORAL at 08:47

## 2023-10-19 RX ADMIN — TRAMADOL HYDROCHLORIDE 50 MG: 50 TABLET, COATED ORAL at 21:26

## 2023-10-19 RX ADMIN — PREGABALIN 100 MG: 100 CAPSULE ORAL at 17:04

## 2023-10-19 RX ADMIN — PANTOPRAZOLE SODIUM 40 MG: 40 TABLET, DELAYED RELEASE ORAL at 06:00

## 2023-10-19 NOTE — SOCIAL WORK
CM placed call to PT daughter Seble Cardenas, left message requesting return call. CM placed call to PCP office spoke with [facility] and informed of PT admission. Was told PT goes to [facility] location. HUNTER placed call to [address] [place] [address] , left message requesting return call. HUNTER placed call to [facility] mental health clinical, spoke with Estelle informed of PT admission. Marylee Camara will update team. Confirmed fax number. Call ended mutually.  [phone]

## 2023-10-19 NOTE — ED NOTES
Patient is accepted at [address] [place] [address] Older Adult   Patient is accepted by Dr. High [place] is arranged with [facility]. Transportation is scheduled for 10/18/2023. Patient may go to the floor at after 2200hrs.

## 2023-10-19 NOTE — PLAN OF CARE
Problem: Ineffective Coping  Goal: Cooperates with admission process  Description: Interventions:   - Complete admission process  Outcome: Progressing   New admit as of 10/19/2023 at [facility]

## 2023-10-19 NOTE — PROGRESS NOTES
Pt belongings:  Night shift did, I was told to put in. Remains with pt:  1 black shirt  2 paid black pants  2 pink shirts  1 blue sweat pants    Contraband: #8  1 tablet   1 cell phone  2 chargers  1 blue walker (laundry room)    Cabinet:  1 pair black pants w/ string  1 pair blue shoes   1 red bag   1 black wallet  Misc papers and cards   2 makeup bags w/ makeup    Security:  # [id number]  2 medicare cards  1 ss.  Card  2 [employer] cards  2 lanta cards  1 mc card  1 visa card  1 amerihealth card  1 birth certificate  1  pair earrings  1 ring    Meds:  [id number]

## 2023-10-19 NOTE — NURSING NOTE
Pt was visible and social in the milieu. Pt was calm and cooperative. Pt was medication compliant. Pt endorsed moderate anxiety. Pt denied depression SI/HI/AVH. Pt ambulates with a roller walker. Fall precautions were maintained. Q 7 minute safety checks were maintained.

## 2023-10-19 NOTE — PROGRESS NOTES
10/19/23 1300   Team Meeting   Meeting Type Tx Team Meeting   Team Members Present   Team Members Present Physician;Nurse;   Physician Team Member Dr. Shayla Brennan DO   Nursing Team Member Saranya Sky RN   Care Management Team Member MS Francois , [facility]   Patient/Family Present   Patient Present Yes   Patient's Family Present No     PT met with [place] team, reviewed [place] plan and goals, all in agreement and signed.

## 2023-10-19 NOTE — H&P
Scarlett Rodriguez#  :1965 F  RUR:[id number]    CVJ:[id number]  Adm Date: 10/19/2023 0038  12:38 AM   ATT PHY: Christopher Kaminski, [facility]         Chief Complaint: anxiety      History of Presenting Illness: Samantha Sandoval is a(n) 59y.o. year old female who is admitted to [address] [place] [address] on voluntary 201 commitment basis. Patient originally presented to [address] [place] [address] on 10/18/2023 for worsening anxiety. Patient examined at bedside. Patient currently complains of chronic back pain. She states this is poorly controlled. Does state she has taken tramadol in the past and this worked well for her. Denies chest pain, shortness of breath, abdominal pain, nausea, vomiting, diarrhea, dysuria, frequency.     No Known Allergies    Current Facility-Administered Medications on File Prior to Encounter   Medication Dose Route Frequency Provider Last Rate Last Admin    [COMPLETED] ketorolac (TORADOL) injection 15 mg  15 mg Intramuscular Once Juanita Warren MD   15 mg at 10/18/23 1848    [DISCONTINUED] acetaminophen (TYLENOL) tablet 650 mg  650 mg Oral Q6H PRN Blanca Bhatti MD   650 mg at 10/18/23 0957    [DISCONTINUED] amLODIPine (NORVASC) tablet 10 mg  10 mg Oral Daily Tawanna Pace, PA-C   10 mg at 10/18/23 0594    [DISCONTINUED] aspirin (ECOTRIN LOW STRENGTH) EC tablet 81 mg  81 mg Oral Daily Tawanna Pace, PA-C   81 mg at 10/18/23 6982    [DISCONTINUED] atorvastatin (LIPITOR) tablet 40 mg  40 mg Oral Daily Tawanna Pace, PA-C   40 mg at 10/18/23 4706    [DISCONTINUED] clonazePAM (KlonoPIN) tablet 0.5 mg  0.5 mg Oral Daily Tawanna Pace, PA-C   0.5 mg at 10/18/23 2268    [DISCONTINUED] clonazePAM (KlonoPIN) tablet 1 mg  1 mg Oral HS Tawanna Pace, PA-C   1 mg at 10/18/23 2210    [DISCONTINUED] escitalopram (LEXAPRO) tablet 20 mg  20 mg Oral Daily Tawanna Pace, PA-C   20 mg at 10/18/23 6116 [DISCONTINUED] ferrous sulfate tablet 325 mg  325 mg Oral Every Other Day Windy Hence, PA-C   325 mg at 10/18/23 7146    [DISCONTINUED] hydrOXYzine HCL (ATARAX) tablet 25 mg  25 mg Oral Q8H PRN Windy Hence, PA-C        [DISCONTINUED] melatonin tablet 3 mg  3 mg Oral HS Windy Hence, PA-C   3 mg at 10/18/23 2210    [DISCONTINUED] pantoprazole (PROTONIX) EC tablet 40 mg  40 mg Oral Early Morning Windy Hence, PA-C   40 mg at 10/18/23 0600    [DISCONTINUED] pregabalin (LYRICA) capsule 100 mg  100 mg Oral TID Windy Hence, PA-C   100 mg at 10/18/23 2210    [DISCONTINUED] QUEtiapine (SEROquel XR) 24 hr tablet 150 mg  150 mg Oral HS Windy Hence, PA-C   150 mg at 10/18/23 2251    [DISCONTINUED] Valbenazine Tosylate CAPS 40 mg  40 mg Oral Daily Windy Hence, PA-C         Current Outpatient Medications on File Prior to Encounter   Medication Sig Dispense Refill    acetaminophen (TYLENOL) 500 mg tablet Take 1 tablet (500 mg total) by mouth every 8 (eight) hours as needed for mild pain 180 tablet 1    amLODIPine (NORVASC) 10 mg tablet Take 1 tablet (10 mg total) by mouth daily 30 tablet 0    Aspirin Low Dose 81 MG EC tablet TAKE 1 TABLET BY MOUTH EVERY DAY 90 tablet 3    atorvastatin (LIPITOR) 40 mg tablet TAKE 1 TABLET BY MOUTH EVERY DAY 90 tablet 3    clonazePAM (KlonoPIN) 0.5 mg tablet Take 1 tablet in the morning and 2 tablets at night 30 tablet 0    Diclofenac Sodium (VOLTAREN) 1 % Apply 2 g topically 4 (four) times a day 240 g 2    escitalopram (LEXAPRO) 20 mg tablet Take 1 tablet (20 mg total) by mouth daily Do not start before September 23, 2023. 30 tablet 0    ferrous sulfate 324 (65 Fe) mg Take 1 tablet (324 mg total) by mouth every other day 30 tablet 1    hydrOXYzine HCL (ATARAX) 25 mg tablet Take 1 tablet (25 mg total) by mouth every 8 (eight) hours as needed for anxiety (panic attacks) 90 tablet 0    Ingrezza 40 MG CAPS Take 40 mg by mouth in the morning 30 capsule 5 melatonin 3 mg Take 1 tablet (3 mg total) by mouth daily at bedtime 30 tablet 0    methocarbamol (ROBAXIN) 500 mg tablet Take 1 tablet (500 mg total) by mouth 2 (two) times a day as needed for muscle spasms 10 tablet 0    pantoprazole (PROTONIX) 40 mg tablet Take 1 tablet (40 mg total) by mouth daily in the early morning Do not start before September 23, 2023. 30 tablet 0    pregabalin (LYRICA) 100 mg capsule Take 1 capsule (100 mg total) by mouth 3 (three) times a day 90 capsule 0    QUEtiapine (SEROquel XR) 150 mg 24 hr tablet Take 1 tablet (150 mg total) by mouth daily at bedtime 30 tablet 0     Active Ambulatory Problems     Diagnosis Date Noted    Chronic pain disorder 02/28/2017    Lumbar radiculopathy 12/08/2017    Lumbar spondylosis 10/31/2016    Fibromyalgia 09/14/2012    Spinal stenosis of lumbar region with neurogenic claudication 02/02/2018    Bipolar disorder ([address]) 06/18/2015    Cognitive disorder 04/18/2014    GERD without esophagitis 05/01/2014    Generalized anxiety disorder with panic attacks 10/26/2016    Essential hypertension 09/14/2012    Insomnia 01/31/2013    Major depressive disorder, recurrent episode, moderate degree ([address]) 09/08/2016    Migraine 05/27/2014    Overactive bladder 09/26/2013    Panic disorder without agoraphobia 06/18/2015    Post traumatic stress disorder 06/18/2015    Tremor 06/12/2014    Urinary incontinence 09/24/2012    Vitamin D deficiency 09/18/2012    Post laminectomy syndrome 10/07/2019    MIMI (obstructive sleep apnea)     Tardive dyskinesia 03/09/2020    Status post insertion of spinal cord stimulator 08/11/2020    Ambulatory dysfunction 09/04/2020    Dysphagia 09/05/2020    Encephalopathy 06/27/2021    History of CVA (cerebrovascular accident) 07/13/2021    Medical clearance for psychiatric admission 07/16/2021    Mixed hyperlipidemia 07/17/2021    CVA (cerebral vascular accident) ([address]) 03/09/2022    Constipation 03/15/2022    Hypokalemia 03/21/2022    Anemia 07/06/2022    Hemiplegia, post-stroke ([address]) 08/31/2022    Memory loss 03/24/2023    Fall 03/29/2023    Witnessed seizure-like activity ([address]) 04/01/2023    Stroke-like episode 07/19/2023    Numbness 07/20/2023    Muscle spasm 09/15/2023    Bipolar I disorder, most recent episode depressed ([address]) 09/26/2019     Resolved Ambulatory Problems     Diagnosis Date Noted    Chronic bilateral low back pain with bilateral sciatica 03/22/2017    Right knee pain 06/06/2016    Lumbar disc disease with radiculopathy 02/02/2018    Pain in joint, shoulder region 10/28/2013    Intractable low back pain 06/16/2014    Bilateral hip pain 06/19/2017    Fatigue 10/26/2015    Female pelvic pain 10/30/2013    History of hypokalemia 01/03/2013    Opioid dependence ([address]) 02/28/2017    Osteoarthritis of both hips 10/31/2016    Osteoarthritis of knee 02/20/2013    Left hip pain 07/25/2014    Primary localized osteoarthritis of both knees 06/16/2017    Recent unexplained weight loss 06/06/2016    Sacroiliitis ([address]) 02/28/2017    Encounter for long-term use of opiate analgesic 12/03/2019    Family history of colorectal cancer 12/11/2019    Morbid obesity with BMI of 40.0-44.9, adult ([address]) 02/06/2020    Complaint related to dreams 02/13/2020    Mild fever 03/09/2020    Primary osteoarthritis of both knees 05/01/2020    Patellofemoral disorder of both knees 05/01/2020    Rash 06/03/2020    Superficial bacterial infection of skin 07/20/2020    Scar of back 07/20/2020    Impaired skin integrity associated with surgical incision 07/20/2020    Arthritis of right knee 10/06/2020    Multiple closed fractures of metatarsal bone of right foot 05/02/2021    Chronic back pain 05/02/2021    CALE (acute kidney injury) ([address]) 05/03/2021    Tachycardia 05/03/2021    Nausea 05/07/2021    Altered mental status     Leukopenia 07/18/2021    Cough 08/26/2021    Preoperative evaluation to rule out surgical contraindication 02/28/2022    S/P total knee arthroplasty, right 03/10/2022    Seizure-like activity ([address]) 2022    Status post total knee replacement, left 2022    Small bowel obstruction ([address]) 2023     Past Medical History:   Diagnosis Date    Anxiety     Arthritis     At risk for falls     Bipolar 2 disorder (HCC)     Depression     Familial tremor     GERD (gastroesophageal reflux disease)     Hearing aid worn     [place] (hard of hearing)     Hyperlipidemia     Hypertension     Left-sided weakness     Memory loss of unknown cause     Obesity     Obesity, Class II, BMI 35-39.9     Panic attack     S/P insertion of spinal cord stimulator     Seasonal allergies     Seizures (HCC)     Stroke (HCC)     Thrombosis of cerebral arteries     Uses walker     Wears dentures     Wears glasses      Past Surgical History:   Procedure Laterality Date    BACK SURGERY       SECTION      COLONOSCOPY      RESOLVED: [id number]    EAR SURGERY      EGD      HYSTERECTOMY      MYRINGOTOMY W/ TUBES Left     NECK SURGERY  2019    NE ARTHRP KNE CONDYLE&PLATU MEDIAL&LAT COMPARTMENTS Right 3/9/2022    Procedure: ARTHROPLASTY KNEE TOTAL;  Surgeon: Kapil Knight DO;  Location[facility];  Service: Orthopedics    NE ARTHRP KNE CONDYLE&PLATU MEDIAL&LAT COMPARTMENTS Left 2022    Procedure: ARTHROPLASTY KNEE TOTAL;  Surgeon: Kapil Knight DO;  [facility];  Service: Orthopedics    NE CYSTOURETHROSCOPY N/A 2016    Procedure: CYSTOSCOPY, BOTOX INJECTION;  Surgeon: Federico oRdriguez MD;  Location: [facility];  Service: Gynecology    NE INSJ/RPLCMT SPI NPGR DIR/INDUXIVE COUPLING Right 2/10/2021    Procedure: REPLACEMENT IMPLANTABLE PULSE GENERATOR DORSAL SPINAL COLUMN STIMULATOR, RIGHT;  Surgeon: Elsy Pillai MD;  [facility]: [facility];  Service: Neurosurgery    NE PRQ IMPLTJ NSTIM ELECTRODE ARRAY EPIDURAL Right 2020    Procedure: INSERTION THORACIC DORSAL COLUMN SPINAL CORD STIMULATOR PERCUTANEOUS W IMPLANTABLE PULSE GENERATOR, RIGHT; Surgeon: Elsy Pillai MD;  Location:  MAIN OR;  Service: Neurosurgery    TONSILLECTOMY      TUBAL LIGATION  1986    UPPER GASTROINTESTINAL ENDOSCOPY  09/2020     Social History:   Social History     Socioeconomic History    Marital status:      Spouse name: None    Number of children: 2    Years of education: graduate school     Highest education level: None   Occupational History    Occupation: Disabled   Tobacco Use    Smoking status: Never    Smokeless tobacco: Never   Vaping Use    Vaping Use: Never used   Substance and Sexual Activity    Alcohol use: Not Currently     Comment: 2 x year; being a social drinker as per all scripts     Drug use: No    Sexual activity: Not Currently   Other Topics Concern    None   Social History Narrative    Bereavement    Daily caffeine consumption, 6-8 servings per day     as per all scripts    Lives in [address] Determinants of Health     Financial Resource Strain: [address]  (2/17/2023)    Overall Financial Resource Strain (CARDIA)     Difficulty of Paying Living Expenses: Not hard at all   Food Insecurity: No Food Insecurity (7/19/2023)    Hunger Vital Sign     Worried About Running Out of Food in the Last Year: Never true     Ran Out of Food in the Last Year: Never true   Transportation Needs: No Transportation Needs (7/19/2023)    PRAPARE - Transportation     Lack of Transportation (Medical): No     Lack of Transportation (Non-Medical): No   Physical Activity: Inactive (3/9/2021)    Exercise Vital Sign     Days of Exercise per Week: 0 days     Minutes of Exercise per Session: 0 min   Stress: Not on file   Social Connections:  Moderately Isolated (3/9/2021)    Social Connection and Isolation Panel [NHANES]     Frequency of Communication with Friends and Family: More than three times a week     Frequency of Social Gatherings with Friends and Family: More than three times a week     Attends [employer] Services: More than 4 times per year     Active Member of Clubs or Organizations: No     Attends Club or Organization Meetings: Never     Marital Status:    Intimate Partner Violence: Not At Risk (3/9/2021)    Humiliation, Afraid, Rape, and Kick questionnaire     Fear of Current or Ex-Partner: No     Emotionally Abused: No     Physically Abused: No     Sexually Abused: No   Housing Stability: Low Risk  (7/19/2023)    Housing Stability Vital Sign     Unable to Pay for Housing in the Last Year: No     Number of Places Lived in the Last Year: 2     Unstable Housing in the Last Year: No     Family History:   Family History   Problem Relation Age of Onset    Colon cancer Mother     Alzheimer's disease Father     Stroke Father     Colon cancer Brother     Bipolar disorder Brother     Breast cancer Maternal Aunt     Colon cancer Maternal Aunt     Heart disease Other     Diabetes Other     Hypertension Other     Seizures Son     Depression Paternal Grandfather     No Known Problems Sister     No Known Problems Brother     Thyroid disease Neg Hx      Review of Systems   Constitutional:  Negative for chills and fever. HENT:  Negative for ear pain and sore throat. Eyes:  Negative for pain and visual disturbance. Respiratory:  Negative for cough and shortness of breath. Cardiovascular:  Negative for chest pain and palpitations. Gastrointestinal:  Negative for abdominal pain, constipation, diarrhea, nausea and vomiting. Genitourinary:  Negative for dysuria and frequency. Musculoskeletal:  Positive for arthralgias, back pain, gait problem and myalgias. Skin:  Negative for color change and rash. Neurological:  Positive for weakness. Negative for dizziness and headaches. Psychiatric/Behavioral:  The patient is nervous/anxious. All other systems reviewed and are negative. Physical Exam   Vitals: Blood pressure 131/75, pulse 72, temperature 97.8 °F (36.6 °C), temperature source Temporal, resp.  rate 18, height 5' 1" (1.549 m), weight 81.7 kg (180 lb 3.2 oz), SpO2 98 %, not currently breastfeeding. ,Body mass index is 34.05 kg/m². Constitutional: Awake, alert, in no acute distress. Head: Normocephalic and atraumatic. Mouth/Throat: Oropharynx is clear and moist.  Orofacial dyskinesia. Eyes: Conjunctivae and EOM are normal.   Neck: Neck supple. Cardiovascular: Normal rate, regular rhythm. Pulmonary/Chest: Effort normal and breath sounds normal.   Abdominal: Soft. Bowel sounds are normal. There is no tenderness. Neurological: Alert, oriented to person, place, and time. Ambulating with walker. Skin: Skin is warm and dry. No edema. Assessment     Samantha Lindsay is a(n) 59 y.o. female with panic disorder. 1.  Cardiac with hx HTN, HLD. Continue amlodipine 10 mg daily, atorvastatin 40 mg daily, ASA 81 mg daily. 2. Tardive dyskinesia. Continue home valbenazine tosylate 40 mg daily. 3. Iron deficiency anemia. Patient is on ferrous sulfate 324 mg every other day. 4. DJD/OA/chronic back pain. Continue Lyrica 100 mg 3 times daily. Tylenol for mild/mod pain. Tramadol q8h prn for severe pain. Robaxin prn.  5. Gait abnormality. PT OT consulted for further evaluation. 6. GERD. Patient is on Protonix 40 mg daily. 7. Psych with panic disorder. This is being managed by the psych team.    Prognosis: Fair. Discharge Plan: In progress. Advanced Directives: I have discussed in detail with the patient the advanced directives. The patient states she does not have an appointed POA and does not have a living will. Patient's emergency contact is her daughter, Marybeth Hartley, whose number is [phone]. When discussing cardiac and pulmonary resuscitation efforts with the patient, the patient wishes to be full code. I have spent more than 50 minutes gathering data, doing physical examination, and discussing the advanced directives, which was witnessed by caring staff.     The patient was discussed with Dr. Patito Calhoun and he is in agreement with the above note.

## 2023-10-19 NOTE — NURSING NOTE
Adjusting to unit. Difficulty falling asleep. Unit walker at bedside. No further complaint of anxiety. Observed sleeping after 0130. Maintained on q 7 minute safety checks. No complaint overnight. Controlled.

## 2023-10-19 NOTE — PROGRESS NOTES
Pt admitted to the unit with the following belongings:      Remains with pt:1 black shirt  2 pair black pants  2 pink shirts  1 blue sweat shirt                    Pt locked cabinet: 200-01  1 pair blue shoes with string  1 pair black pants with string  1 blue shirt with string  1 red bag  1 black wallet / misc papers and cards  2 make up cases filled with make-up            Contraband: #8  1 tablet  1 cell phone  2 [facility] safe - bag #[id number]  2 medicare cards  1 S. S card  2 [employer] card  2 lanta card  1 Mc card  1 visa card  1 [facility] card  1 birth certificate  1 pair earrings  1 ring          Medications - bag #[id number]

## 2023-10-19 NOTE — EMTALA/ACUTE CARE TRANSFER
[facility] EMERGENCY DEPARTMENT  [address] [facility] [address] [phone]  Dept: [phone]      EMTALA TRANSFER CONSENT    NAME Samantha Hendrickson 1965                              MRN [id number]    I have been informed of my rights regarding examination, treatment, and transfer   by Dr. Mookie Fitzgerald MD    Benefits: Specialized equipment and/or services available at the receiving facility (Include comment)________________________ (psych)    Risks: Potential for delay in receiving treatment, Potential deterioration of medical condition, Increased discomfort during transfer, Possible worsening of condition or death during transfer      Consent for Transfer:  I acknowledge that my medical condition has been evaluated and explained to me by the emergency department physician or other qualified medical person and/or my attending physician, who has recommended that I be transferred to the service of    at  . The above potential benefits of such transfer, the potential risks associated with such transfer, and the probable risks of not being transferred have been explained to me, and I fully understand them. The doctor has explained that, in my case, the benefits of transfer outweigh the risks. I agree to be transferred. I authorize the performance of emergency medical procedures and treatments upon me in both transit and upon arrival at the receiving facility. Additionally, I authorize the release of any and all medical records to the receiving facility and request they be transported with me, if possible. I understand that the safest mode of transportation during a medical emergency is an ambulance and that the Hospital advocates the use of this mode of transport.  Risks of traveling to the receiving facility by car, including absence of medical control, life sustaining equipment, such as oxygen, and medical personnel has been explained to me and I fully understand them. (KAYCE CORRECT BOX BELOW)  [  ]  I consent to the stated transfer and to be transported by ambulance/helicopter. [  ]  I consent to the stated transfer, but refuse transportation by ambulance and accept full responsibility for my transportation by car. I understand the risks of non-ambulance transfers and I exonerate the Hospital and its staff from any deterioration in my condition that results from this refusal.    X___________________________________________    DATE  10/18/23  TIME________  Signature of patient or legally responsible individual signing on patient behalf           RELATIONSHIP TO PATIENT_________________________          Provider Certification    NAME Samantha Mckeon 1965                              MRN [id number]    A medical screening exam was performed on the above named patient. Based on the examination:    Condition Necessitating Transfer The primary encounter diagnosis was Anxiety. A diagnosis of Back pain was also pertinent to this visit.     Patient Condition: The patient has been stabilized such that within reasonable medical probability, no material deterioration of the patient condition or the condition of the unborn child(mere) is likely to result from the transfer    Reason for Transfer: Level of Care needed not available at this facility    Transfer Requirements: [address] available and qualified personnel available for treatment as acknowledged by    Agreed to accept transfer and to provide appropriate medical treatment as acknowledged by          Appropriate medical records of the examination and treatment of the patient are provided at the time of transfer   [address] _______  Transfer will be performed by qualified personnel from    and appropriate transfer equipment as required, including the use of necessary and appropriate life support measures. Provider Certification: I have examined the patient and explained the following risks and benefits of being transferred/refusing transfer to the patient/family:  General risk, such as traffic hazards, adverse weather conditions, rough terrain or turbulence, possible failure of equipment (including vehicle or aircraft), or consequences of actions of persons outside the control of the transport personnel, Unanticipated needs of medical equipment and personnel during transport, Risk of worsening condition, The possibility of a transport vehicle being unavailable      Based on these reasonable risks and benefits to the patient and/or the unborn child(mere), and based upon the information available at the time of the patient’s examination, I certify that the medical benefits reasonably to be expected from the provision of appropriate medical treatments at another medical facility outweigh the increasing risks, if any, to the individual’s medical condition, and in the case of labor to the unborn child, from effecting the transfer.     X____________________________________________ DATE 10/18/23        TIME_______      ORIGINAL - SEND TO MEDICAL RECORDS   COPY - SEND WITH PATIENT DURING TRANSFER

## 2023-10-19 NOTE — PROGRESS NOTES
10/19/23 1400   Activity/Group Checklist   Group Admission/Discharge   Attendance Attended   Attendance Duration (min) 16-30   Interactions Interacted appropriately   Affect/Mood Appropriate   Goals Achieved Identified feelings; Identified triggers; Identified relapse prevention strategies; Discussed coping strategies; Able to listen to others; Identified resources and support systems; Able to engage in interactions; Able to reflect/comment on own behavior;Able to self-disclose; Able to recieve feedback     Patient agreeable to meet and complete self assessment and relapse prevention plan with CTRS. Patient struggles to write and asked CTRS to write her responses on documents; Patient did sign her name.

## 2023-10-19 NOTE — PROGRESS NOTES
10/19/23     Team Meeting   Meeting Type Daily Rounds   Team Members Present   Team Members Present Physician;Nurse;;Occupational Therapist   Physician Team Member Dr. Michela Buchanan, DO; SHAKILA Burnette   Nursing Team Member Adrienne Rutledge RN   Care Management Team Member MS Francois, [facility]   OT Team Member [place], [place]   Patient/Family Present   Patient Present No   Patient's Family Present No   New admission, 12. Reviewed medical and psych hx. Increased anxiety and depression.
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
## 2023-10-19 NOTE — NURSING NOTE
Ordered medications given as ordered and checked by pharmacy. 2nd dose of Melatonin held due to being a duplicate order. Patient settling in bed and adjusting to unit. Request Depends, given.
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
## 2023-10-19 NOTE — ED NOTES
Given report by previous RN. Pt awake alert and oriented resting with no signs of respiratory distress or discomfort on stretcher. Q15 continued.       Joaquim Hahn RN  10/18/23 3018

## 2023-10-19 NOTE — TREATMENT PLAN
TREATMENT PLAN REVIEW - Behavioral Health Samantha Miller 59 y.o. 1965 female MRN: [id number]    [address] [address] Room / Bed: Amaya Stevens/[facility] 200-01 Encounter: [id number]          Admit Date/Time:  10/19/2023 12:38 AM    Treatment Team: Attending Provider: Simone Kaye MD; Recreational Therapist: Leonarda Brooks; Certified Nursing Assistant: Nikkie Corrigan; Patient Care Assistant: Devi Vigil; Patient Care Assistant: Sonja Head; Registered Nurse: Yan Myers RN; Licensed Practical Nurse: Zandra Rayo LPN; : Brenden Montana MS    Diagnosis: Principal Problem:    Generalized anxiety disorder with panic attacks  Active Problems:    Depression      Patient Strengths/Assets: adapts well, cooperative, negotiates basic needs, patient is on a voluntary commitment    Patient Barriers/Limitations: poor insight, poor past treatment response    Short Term Goals: decrease in depressive symptoms, decrease in anxiety symptoms, mood stabilization    Long Term Goals: improvement in depression, improvement in anxiety, stabilization of mood    Progress Towards Goals: starting psychiatric medications as prescribed    Recommended Treatment: medication management, patient medication education, group therapy, milieu therapy, continued Behavioral Health psychiatric evaluation/assessment process    Treatment Frequency: daily medication monitoring, group and milieu therapy daily, monitoring through interdisciplinary rounds, monitoring through weekly patient care conferences    Expected Discharge Date:  5-7 midnights    Discharge Plan: referrals as indicated    Treatment Plan Created/Updated By: Fred Parekh DO

## 2023-10-19 NOTE — ED NOTES
Pt Behavioral Health Authorization Request faxed to [facility] [phone]. Roundtrip booked for  from [facility] and transport to [address] [place] [address].

## 2023-10-19 NOTE — PROGRESS NOTES
Pt belongings:  Night shift did it, I was told to enter by nurses    Ngoc 8  Tablet and    Cell phone and       [address]:  # [phone]  Medicare x 2  Ss card x 2  Birth certificate  Pa id x 2  Lanta card x 2  Mc card  [facility]   #[id number]

## 2023-10-19 NOTE — NURSING NOTE
Arslan, from [facility] to [facility] patient medication to unit.

## 2023-10-19 NOTE — NURSING NOTE
Patient admitted to Older Adult Behavior Health Unit, [address] [place] [address] from [facility] ER with a valid 201. Physical assessment completed, no wounds or weapons noted. Samantha wears glasses. She usually has dentures but is waiting on a new pain. Using walker to ambulate. Given unit walker due to her walker having a loose wheel. Patient mildly upset at this. Stats she doesn't like hospital walkers. Gait is noted as unsteady. To be place on fall risk. Scars noted on bilateral knees and along spinal column. Denied any suicidal ideations. Stated she has high anxiety and ongoing depression. Refused shower, no signs of infestation noted. Bill of Rights and HIPPA regulations reviewed and signed. Admission medication and diet ordered. Oriented to unit. Started on Q 7 minute safety checks. Fluids and food given. Appetite fair. Personal property recorded. Affect flat. Appears depressed.

## 2023-10-20 ENCOUNTER — PATIENT OUTREACH (OUTPATIENT)
Dept: INTERNAL MEDICINE CLINIC | Facility: CLINIC | Age: 58
End: 2023-10-20

## 2023-10-20 PROCEDURE — 97163 PT EVAL HIGH COMPLEX 45 MIN: CPT

## 2023-10-20 PROCEDURE — 97167 OT EVAL HIGH COMPLEX 60 MIN: CPT

## 2023-10-20 PROCEDURE — 99232 SBSQ HOSP IP/OBS MODERATE 35: CPT | Performed by: PSYCHIATRY & NEUROLOGY

## 2023-10-20 RX ORDER — CYANOCOBALAMIN 1000 UG/ML
1000 INJECTION, SOLUTION INTRAMUSCULAR; SUBCUTANEOUS
Status: DISCONTINUED | OUTPATIENT
Start: 2023-10-21 | End: 2023-11-10 | Stop reason: HOSPADM

## 2023-10-20 RX ORDER — ERGOCALCIFEROL 1.25 MG/1
50000 CAPSULE ORAL WEEKLY
Status: DISCONTINUED | OUTPATIENT
Start: 2023-10-21 | End: 2023-11-10 | Stop reason: HOSPADM

## 2023-10-20 RX ORDER — MELATONIN
1000 DAILY
Status: DISCONTINUED | OUTPATIENT
Start: 2023-12-10 | End: 2023-11-10 | Stop reason: HOSPADM

## 2023-10-20 RX ADMIN — TRAMADOL HYDROCHLORIDE 50 MG: 50 TABLET, COATED ORAL at 06:20

## 2023-10-20 RX ADMIN — TRAMADOL HYDROCHLORIDE 50 MG: 50 TABLET, COATED ORAL at 21:57

## 2023-10-20 RX ADMIN — PANTOPRAZOLE SODIUM 40 MG: 40 TABLET, DELAYED RELEASE ORAL at 06:20

## 2023-10-20 RX ADMIN — CLONAZEPAM 1 MG: 1 TABLET ORAL at 21:58

## 2023-10-20 RX ADMIN — ATORVASTATIN CALCIUM 40 MG: 40 TABLET, FILM COATED ORAL at 17:13

## 2023-10-20 RX ADMIN — PREGABALIN 100 MG: 100 CAPSULE ORAL at 17:13

## 2023-10-20 RX ADMIN — PREGABALIN 100 MG: 100 CAPSULE ORAL at 08:36

## 2023-10-20 RX ADMIN — CLONAZEPAM 0.5 MG: 0.5 TABLET ORAL at 08:36

## 2023-10-20 RX ADMIN — PREGABALIN 100 MG: 100 CAPSULE ORAL at 21:57

## 2023-10-20 RX ADMIN — Medication 6 MG: at 21:57

## 2023-10-20 RX ADMIN — SERTRALINE HYDROCHLORIDE 50 MG: 50 TABLET ORAL at 08:35

## 2023-10-20 RX ADMIN — AMLODIPINE BESYLATE 10 MG: 10 TABLET ORAL at 08:36

## 2023-10-20 RX ADMIN — FERROUS SULFATE TAB 325 MG (65 MG ELEMENTAL FE) 325 MG: 325 (65 FE) TAB at 08:35

## 2023-10-20 RX ADMIN — ASPIRIN 81 MG: 81 TABLET, COATED ORAL at 08:36

## 2023-10-20 RX ADMIN — QUETIAPINE 200 MG: 50 TABLET, FILM COATED, EXTENDED RELEASE ORAL at 21:58

## 2023-10-20 NOTE — PROGRESS NOTES
Outpatient Care Management Note:    Received ADT alert that patient was in the emergency room on 10/18/23 at [facility] for worsening anxiety and panic attacks and requesting to be admitted to inpatient behavioral health. Patient was transferred to [address] [place] [address] on 10/19/23 to present on voluntary 201. Chart reviewed.

## 2023-10-20 NOTE — PLAN OF CARE
Problem: SAFETY ADULT  Goal: Patient will remain free of falls  Description: INTERVENTIONS:  - Educate patient/family on patient safety including physical limitations  - Instruct patient to call for assistance with activity   - Consult OT/PT to assist with strengthening/mobility   - Keep Call bell within reach  - Keep bed low and locked with side rails adjusted as appropriate  - Keep care items and personal belongings within reach  - Initiate and maintain comfort rounds  - Make Fall Risk Sign visible to staff  - Offer Toileting every 2 Hours, in advance of need  - Initiate/Maintain NA  - Obtain necessary fall risk management equipment Rolling Walker  Problem: Depression  Goal: Treatment Goal: Demonstrate behavioral control of depressive symptoms, verbalize feelings of improved mood/affect, and adopt new coping skills prior to discharge  Outcome: Progressing  Goal: Verbalize thoughts and feelings  Description: Interventions:  - Assess and re-assess patient's level of risk   - Engage patient in 1:1 interactions, daily, for a minimum of 15 minutes   - Encourage patient to express feelings, fears, frustrations, hopes   Outcome: Progressing  Goal: Refrain from harming self  Description: Interventions:  - Monitor patient closely, per order   - Supervise medication ingestion, monitor effects and side effects   Outcome: Progressing  Goal: Refrain from isolation  Description: Interventions:  - Develop a trusting relationship   - Encourage socialization   Outcome: Progressing  Goal: Refrain from self-neglect  Outcome: Progressing  Goal: Attend and participate in unit activities, including therapeutic, recreational, and educational groups  Description: Interventions:  - Provide therapeutic and educational activities daily, encourage attendance and participation, and document same in the medical record   Outcome: Progressing  Goal: Complete daily ADLs, including personal hygiene independently, as able  Description: Interventions:  - Observe, teach, and assist patient with ADLS  -  Monitor and promote a balance of rest/activity, with adequate nutrition and elimination   Outcome: Progressing     Problem: Anxiety  Goal: Anxiety is at manageable level  Description: Interventions:  - Assess and monitor patient's anxiety level. - Monitor for signs and symptoms (heart palpitations, chest pain, shortness of breath, headaches, nausea, feeling jumpy, restlessness, irritable, apprehensive). - Collaborate with interdisciplinary team and initiate plan and interventions as ordered.   - [place] patient to unit/surroundings  - Explain treatment plan  - Encourage participation in care  - Encourage verbalization of concerns/fears  - Identify coping mechanisms  - Assist in developing anxiety-reducing skills  - Administer/offer alternative therapies  - Limit or eliminate stimulants  Outcome: Progressing     - Apply yellow socks and bracelet for high fall risk patients  - Consider moving patient to room near nurses station  Outcome: Progressing     Problem: Depression  Goal: Treatment Goal: Demonstrate behavioral control of depressive symptoms, verbalize feelings of improved mood/affect, and adopt new coping skills prior to discharge  Outcome: Progressing  Goal: Verbalize thoughts and feelings  Description: Interventions:  - Assess and re-assess patient's level of risk   - Engage patient in 1:1 interactions, daily, for a minimum of 15 minutes   - Encourage patient to express feelings, fears, frustrations, hopes   Outcome: Progressing  Goal: Refrain from harming self  Description: Interventions:  - Monitor patient closely, per order   - Supervise medication ingestion, monitor effects and side effects   Outcome: Progressing  Goal: Refrain from isolation  Description: Interventions:  - Develop a trusting relationship   - Encourage socialization   Outcome: Progressing  Goal: Refrain from self-neglect  Outcome: Progressing  Goal: Attend and participate in unit activities, including therapeutic, recreational, and educational groups  Description: Interventions:  - Provide therapeutic and educational activities daily, encourage attendance and participation, and document same in the medical record   Outcome: Progressing  Goal: Complete daily ADLs, including personal hygiene independently, as able  Description: Interventions:  - Observe, teach, and assist patient with ADLS  -  Monitor and promote a balance of rest/activity, with adequate nutrition and elimination   Outcome: Progressing     Problem: Anxiety  Goal: Anxiety is at manageable level  Description: Interventions:  - Assess and monitor patient's anxiety level. - Monitor for signs and symptoms (heart palpitations, chest pain, shortness of breath, headaches, nausea, feeling jumpy, restlessness, irritable, apprehensive). - Collaborate with interdisciplinary team and initiate plan and interventions as ordered.   - [place] patient to unit/surroundings  - Explain treatment plan  - Encourage participation in care  - Encourage verbalization of concerns/fears  - Identify coping mechanisms  - Assist in developing anxiety-reducing skills  - Administer/offer alternative therapies  - Limit or eliminate stimulants  Outcome: Progressing

## 2023-10-20 NOTE — PLAN OF CARE
Problem: PHYSICAL THERAPY ADULT  Goal: Performs mobility at highest level of function for planned discharge setting. See evaluation for individualized goals. Description: Treatment/Interventions: Functional transfer training, LE strengthening/ROM, Therapeutic exercise, Endurance training, Elevations, Patient/family training, Equipment eval/education, Bed mobility, Gait training, Spoke to nursing, Spoke to case management  Equipment Recommended:  (pt encouraged to use RW at this time)       See flowsheet documentation for full assessment, interventions and recommendations. Note: Prognosis: Good  Problem List: Decreased strength, Decreased endurance, Impaired balance, Decreased mobility, Pain  Assessment: Pt is 59 y.o. female seen for high-complexity PT evaluation on 10/20/2023 s/p admit to [address] [place] [address] on 10/19/2023 w/ Generalized anxiety disorder with panic attacks. PT was consulted to assess pt's functional mobility and d/c needs. Order placed for PT eval and tx, w/ up and OOB as tolerated and ambulate patient orders. PTA, pt lives c son and family in 2nd floor apt c FOS to access, amb c rollator @ baseline, (I) ADLs primarily. At time of eval, pt requires SUP for all functional mobility tasks, including amb x 140 ft with use of RW. Upon evaluation, pt presenting with impaired functional mobility d/t decreased strength, decreased endurance, impaired balance, decreased mobility, and pain. Pertinent PMHx and current co-morbidities affecting pt's physical performance at time of assessment include: HTN, HLD, tardive dyskinesia, iron deficiency anemia, DJD/OA, chronic back pain, gait abnormality, GERD. Personal factors affecting pt at time of eval include: decreased initiation and engagement. The following objective measures performed on IE also reveal limitations: AM-PAC 6-Clicks: 42/87.  Pt's clinical presentation is currently unstable/unpredictable seen in pt's presentation of need for input for task focus and mobility technique and ongoing medical assessment, moderate-severe low back pain with radiating pain to B LEs. Overall, pt's rehab potential and prognosis to return to PLOF is good as impacted by objective findings, warranting pt to receive further skilled PT interventions to address identified impairments, activity limitation(s), and participation restriction(s). Goal for patient is to get better. Pt to benefit from continued PT tx to address deficits as defined above and maximize level of functional independent mobility and consistency in order for pt to improve activity tolerance. From PT/mobility standpoint, recommendation at time of d/c would be home with home health rehabilitation pending progress in order to facilitate return to PLOF. Barriers to Discharge: Inaccessible home environment, Decreased caregiver support     PT Discharge Recommendation: Home with home health rehabilitation    See flowsheet documentation for full assessment.

## 2023-10-20 NOTE — NURSING NOTE
Pleasant during assessment. Denied any panic attacks this evening. Stated anxiety and depression are mild. Requesting different walker due to current one not assisting her current needs. Talked at length about her life stressor and her feelings about her home life condition (living with her daughter and 4 children). Positive for HS medications and evening snack. Ultram 50 mg given at 2126 for # 8 back pain. Maintained on q 7 minute safety checks. [None] : The patient is currently asymptomatic [FreeTextEntry1] : Lucy is a 3 year old girl for follow-up of complex febrile seizure. \par Last visit was in August 2018 . ( 6 months ago).\par \par No seizure since June 2018; with fever\par currently in  mainstreamed in a class of 15:1:2;\par Learning letters, numbers; can read a few sight words\par \par Last seizure with fever on June 18, 2018\par She had a busy day the day before; playing soccer, swimming;\par On the day of seizure, she was lying on a couch, looked tired ( not her usual self); Mother checked her temperature- no fever;\par An hour later, had a  GTC x 2 minutes;brought to [facility]; temperature checked 3 hours later was 102F;\par discharged from ER; found to have  UTI treated with antibiotics\par \par Prior seizure was September 20, 2016 with fever \par \par History reviewed:\par Lucy had a seizure at 6 weeks old. The seizure was described as eyes and head turn to the left, arms and legs stiff lasting  5 minutes. She was brought by ambulance to  [facility] where  LP, EEG, head  CT- were normal.\par \par On May 27,2015, Lucy had a  GTC x 4-5 minutes with fever 102, 104F. She was  post ictal x 20 minutes; brought by ambulance to [facility]. Diagnosed: febrile seizure.\par \par She had 2 episodes of seizures with fever on February 18, 2016. The seizures occurred within 12 hours.\par At 4:30 am while sleeping, parents heard gurgling noise from the baby monitor. The seizure was  GTC x 1.5 minutes. She felt warm, found with temperature 101F, brought to ER, no source of infection, sent home. Second seizure at 5 pm while on Tylenol for fever, GTC x 3 min, 102.8F, brought to ER again, respiratory panel done- possible viral URI-saw Peds the following day, no source of infection, continued with low grade fever x 1 week\par \par Lucy had another seizure with fever on Sept 20, 2016\par GTC x 2 minutes, occurred out of sleep, she was moaning after, then has some shaking of extremities ( shiver-like), EMS came and told the parents that the episode was second seizure.\par She was brought to [facility]- video EEG x 24 hours- 3 bursts of generalized high amplitude activity with spikes embedded. Potential for generalized seizure.

## 2023-10-20 NOTE — PROGRESS NOTES
10/20/23   Team Meeting   Meeting Type Tx Team Meeting   Team Members Present   Team Members Present Physician;Nurse;Occupational Therapist;   Physician Team Member Dr. João Funez; Bryanna JHAVERI   Nursing Team Member Tc Leung RN   Social Work Team Member [phone]   OT Team Member Paulino Dugan CTRS   Patient/Family Present   Patient Present No   Patient's Family Present No     Pt reported moderate anxiety d/t 'bad dreams'. No panic attacks reported. Pt visible on unit during day; evenings in room. Pt concerned that meds will cause weight gain. No d/c date; poss end of next week.

## 2023-10-20 NOTE — OCCUPATIONAL THERAPY NOTE
Occupational Therapy Evaluation     Patient Name: Radha Maria  SLURV'Z Date: 10/20/2023  Problem List  Principal Problem:    Generalized anxiety disorder with panic attacks  Active Problems:    Post traumatic stress disorder    Depression    Past Medical History  Past Medical History:   Diagnosis Date    Anxiety     Arthritis     left knee    Arthritis of right knee 10/6/2020    At risk for falls     Bipolar 2 disorder (HCC)     FOLLOWS WITH PSYCHIATRIST. CONTINUE LAMOTRIGINE; RESOLVED: [id number]    Depression     Familial tremor     both hands    Fibromyalgia     LAST ASSESSED: [id number]    GERD (gastroesophageal reflux disease)     Hearing aid worn     left ear    [place] (hard of hearing)     left ear    Hyperlipidemia     Hypertension     Left-sided weakness     Lumbar disc disease with radiculopathy 2/2/2018    Memory loss of unknown cause     long and short term    Migraine     Multiple closed fractures of metatarsal bone of right foot 5/2/2021    Obesity     Obesity, Class II, BMI 35-39.9     Osteoarthritis of both hips 10/31/2016    Osteoarthritis of knee 2/20/2013    Description: Continue Tylenol and Naproxen. Encourage exercise and weight loss. Patient refused physical therapy. I will refer the patient back to Orthopedics.     Overactive bladder     Panic attack     Patellofemoral disorder of both knees 5/1/2020    Post traumatic stress disorder     Primary localized osteoarthritis of both knees 6/16/2017    Primary osteoarthritis of both knees 5/1/2020    S/P insertion of spinal cord stimulator     no remote    S/P total knee arthroplasty, right 3/10/2022    Sacroiliitis ([address]) 2/28/2017    Seasonal allergies     Seizure-like activity ([address]) 6/3/2022    Seizures ([address])     possible seizure like activity    Small bowel obstruction ([address]) 3/24/2023    Status post total knee replacement, left 7/5/2022    Stroke [facility])     questionable stroke 2009    Tardive dyskinesia     PATIENT STATES    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 CONT ASA 81 MG DAILY; RESOLVED: [id number]    Urinary incontinence     Uses walker     Wears dentures     partial lower / full upper- doesnt wear    Wears glasses      Past Surgical History  Past Surgical History:   Procedure Laterality Date    BACK SURGERY       SECTION      COLONOSCOPY      RESOLVED: [id number]    EAR SURGERY      EGD      HYSTERECTOMY  2004    MYRINGOTOMY W/ TUBES Left     NECK SURGERY  2019    VT ARTHRP KNE CONDYLE&PLATU MEDIAL&LAT COMPARTMENTS Right 3/9/2022    Procedure: ARTHROPLASTY KNEE TOTAL;  Surgeon: Odell Lubin DO;  Location: [facility];  Service: Orthopedics    VT ARTHRP KNE CONDYLE&PLATU MEDIAL&LAT COMPARTMENTS Left 2022    Procedure: ARTHROPLASTY KNEE TOTAL;  Surgeon: Odell Lubin DO;  Location: [facility];  Service: Orthopedics    VT CYSTOURETHROSCOPY N/A 2016    Procedure: CYSTOSCOPY, BOTOX INJECTION;  Surgeon: Negar Ballard MD;  Location: [facility];  Service: Gynecology    VT INSJ/RPLCMT SPI NPGR DIR/INDUXIVE COUPLING Right 2/10/2021    Procedure: REPLACEMENT IMPLANTABLE PULSE GENERATOR DORSAL SPINAL COLUMN STIMULATOR, RIGHT;  Surgeon: Richard Varghese MD;  Location: [facility];  Service: Neurosurgery    VT PRQ [address] NSTIM ELECTRODE ARRAY EPIDURAL Right 2020    Procedure: INSERTION THORACIC DORSAL COLUMN SPINAL CORD STIMULATOR PERCUTANEOUS W IMPLANTABLE PULSE GENERATOR, RIGHT;  Surgeon: Richard Varghese MD;  Location: [facility];  Service: Neurosurgery    TONSILLECTOMY      TUBAL LIGATION      UPPER GASTROINTESTINAL ENDOSCOPY  2020        10/20/23 0740   OT Last Visit   OT Visit Date 10/20/23   Note Type   Note type Evaluation   Pain Assessment   Pain Assessment Tool 0-10   Pain Score 7   Pain Location/Orientation Orientation: Bilateral;Orientation: Upper; Location: Leg;Orientation: Lower; Location: Back   Pain Onset/Description Onset: Ongoing;Frequency: Constant/Continuous Patient's Stated Pain Goal No pain   Hospital Pain Intervention(s) Emotional support;Rest;Repositioned   Restrictions/Precautions   Weight Bearing Precautions Per Order No   Other Precautions Cognitive; Fall Risk;Pain   Home Living   Type of Home Apartment  (2nd floor)   Home Layout One level;Performs ADLs on one level;Stairs to enter with rails  (FOS to access apt level)   Bathroom Shower/Tub Tub/shower unit   Bathroom Toilet Standard   Bathroom Equipment Shower chair  (does not use at baseline)   [address]  (pt admits to rollator use at baseline)   Prior Function   Level of [place] Independent with ADLs; Independent with functional mobility; Needs assistance with IADLS   Lives With Son;Family   Receives Help From Family;Personal care attendant  (HHA pt reports 5hrs/day ("most days"))   IADLs Family/Friend/Other provides transportation; Family/Friend/Other provides meals; Family/Friend/Other provides medication management   Falls in the last 6 months 0  (pt denies)   Subjective   Subjective "I get around okay, I have some help at home"   ADL   Where Assessed Edge of bed   UB Bathing Assistance 5  Supervision/Setup   LB Bathing Assistance 4  Minimal Assistance   UB Dressing Assistance 5  Supervision/Setup   LB Dressing Assistance 4  Minimal Assistance   LB Dressing Deficit Don/doff L sock; Don/doff R sock; Increased time to complete  (cross over leg technique)   Bed Mobility   Additional Comments DNT; pt received standing in bathroom and seated in chair upon conclusion of session   Transfers   Sit to Stand 5  Supervision   Additional items Assist x 1; Increased time required   Stand to Sit 5  Supervision   Additional items Assist x 1; Increased time required   Additional Comments RW used   Functional Mobility   Functional Mobility 5  Supervision   Additional Comments Pt completed long distance ADL mobility from room > dining room at (S) level w/ RW.  No significant LOB observed, mild instability   Additional items Rolling walker   Balance   Static Sitting Good   Dynamic Sitting Fair +   Static Standing Fair   Dynamic Standing Fair -   Ambulatory Fair -   Activity Tolerance   Activity Tolerance Patient limited by fatigue;Patient limited by pain   Medical Staff Made Aware Yes, coordination of care provided with PT Bre   Nurse Made Aware Yes, nursing staff made aware of session outcomes   RUE Assessment   RUE Assessment WFL   RUE Strength   RUE Overall Strength   (3+/5)   LUE Assessment   LUE Assessment WFL   LUE Strength   LUE Overall Strength   (3+/5)   Cognition   Arousal/Participation Alert; Responsive   Attention Within functional limits   Orientation Level Oriented X4   Memory Decreased recall of precautions;Decreased recall of recent events   Following Commands Follows one step commands with increased time or repetition   Comments Pt agreeable to OT evaluation, pleasant   Assessment   Limitation Decreased ADL status; Decreased UE strength;Decreased Safe judgement during ADL;Decreased endurance;Decreased high-level ADLs   Prognosis Good   Assessment Pt is a 59 y.o. female seen for OT evaluation s/p admit to [facility]. [facility] on 10/19/2023 w/ Generalized anxiety disorder with panic attacks. Comorbidities affecting pt's functional performance at time of assessment include: PTSD, chronic pain disorder, lumbar radiculopathy, fibromyalgia, spinal stenosis, bipolar disorder, cognitive disorder, HTN, MDD, vit D deficiency. Personal factors affecting pt at time of IE include:steps to enter environment, difficulty performing ADLS, difficulty performing IADLS , limited insight into deficits, decreased initiation and engagement , and health management . Prior to admission, pt was I with ADLs and required A with IADLs.  Upon evaluation: the following deficits impact occupational performance: weakness, decreased strength, decreased balance, decreased tolerance, decreased safety awareness, and increased pain. Pt to benefit from continued skilled OT tx while in the hospital to address deficits as defined above and maximize level of functional independence w ADL's and functional mobility. Occupational Performance areas to address include: grooming, bathing/shower, toilet hygiene, dressing, functional mobility, community mobility, and clothing management. From OT standpoint, recommendation at time of d/c would be home with home health OT. Goals   Patient Goals to get better   Plan   Treatment Interventions ADL retraining;UE strengthening/ROM; Functional transfer training; Endurance training;Patient/family training;Energy conservation; Activityengagement   Goal Expiration Date 11/09/23   OT Treatment Day 0   OT Frequency   (2-5x)   Discharge Recommendation   OT Discharge Recommendation Home with home health rehabilitation   Additional Comments  The patient's raw score on the AM-PAC Daily Activity inpatient short form is 19, standardized score is 40.22, greater than 39.4. Patients at this level are likely to benefit from discharge to home. Please refer to the recommendation of the Occupational Therapist for safe discharge planning.    AM-PAC Daily Activity Inpatient   Lower Body Dressing 2   Bathing 3   Toileting 3   Upper Body Dressing 3   Grooming 4   Eating 4   Daily Activity Raw Score 19   Daily Activity Standardized Score (Calc for Raw Score >=11) 40.22   AM-PAC Applied Cognition Inpatient   Following a Speech/Presentation 3   Understanding Ordinary Conversation 4   Taking Medications 3   Remembering Where Things Are Placed or Put Away 3   Remembering List of 4-5 Errands 3   Taking Care of Complicated Tasks 2   Applied Cognition Raw Score 18   Applied Cognition Standardized Score 38.07     GOALS:    Pt will achieve the following within specified time frame: LTG  Pt will achieve the following goals within 10 days    *ADL transfers with (I) for inc'd independence with ADLs/purposeful tasks    *UB ADL with (I) for inc'd independence with self cares    *LB ADL with (I) using AE prn for inc'd independence with self cares    *Toileting with (I) for clothing management and hygiene for return to PLOF with personal care    *Increase static stand balance and dyn stand balance to F+ for inc'd safety with standing purposeful tasks    *Increase stand tolerance x7 m for inc'd tolerance with standing purposeful tasks    *Bed mobility- (I) for inc'd independence to manage own comfort and initiate EOB & OOB purposeful tasks      *Participate in 10-15m UE therex to increase overall stamina/activity tolerance for purposeful tasks      Rajni Miller, MS, OTR/L

## 2023-10-20 NOTE — NURSING NOTE
Patient visible in milieu, pleasant and cooperative in interaction. Social with staff and peers. Patient endorses moderate anxiety over having "bad dreams" - would not further elaborate on type of dreams, mild depression. Patient denies SI/HI, hallucinations. No noted panic attacks thus far. Remains medication compliant and on 7" checks for safety and behaviors.

## 2023-10-20 NOTE — PROGRESS NOTES
Progress Note - Behavioral Health   Samantha QUINONEZ Ori Whalen 59 y.o. female MRN: [id number]  Unit/Bed#: [facility] Encounter: [id number]    Assessment/Plan   Principal Problem:    Generalized anxiety disorder with panic attacks  Active Problems:    Post traumatic stress disorder    Depression      Subjective: Patient was seen, chart was reviewed, and case was discussed with entire team.   Patient seen in day room while sitting and reading the paper. Overall is cooperative, slight edginess but remains calm. Patient reports sleeping better last night. Still reports being depressed and anxious but has had no panic episodes. Patient reported having some bad dreams last night. Patient expresses concern about weight gain medications. Patient also talks about not wanting to go back to the place she was living before admitted. She feels like she is a burden to her children. Patient did eat breakfast this morning. She has been medication compliant. Psychiatric Review of Systems:  Behavior over the last 24 hours:  Slight improvement  Sleep: normal and nightmares  Appetite: adequate  Medication side effects: none verbalized  Medical ROS: Complete review of systems is negative except as noted above.             Vitals:  Vitals:    10/20/23 0733   BP: 122/75   Pulse: 61   Resp: 16   Temp: (!) 96.7 °F (35.9 °C)   SpO2: 98%       Mental Status Exam:    Appearance:  alert, good eye contact, appears stated age, casually dressed, and appropriate grooming and hygiene   Behavior:  calm, cooperative, and somewhat edgy this morning   Speech:  dysarthric, normal rate, normal volume, and difficulty with speech due to tardive dyskinetic movements of her tongue and mouth   Mood:  anxious and depressed   Affect:  constricted   Thought Process:  goal directed   Thought Content:  no verbalized delusions or overt paranoia   Perceptual Disturbances: no reported hallucinations and does not appear to be responding to internal stimuli at this time   Risk Potential: Suicidal ideation - None at present  Homicidal ideation - None at present  Potential for aggression - No   Sensorium:  oriented to person, place, and time/date   Memory:  recent memory grossly intact   Consciousness:  alert and awake   Attention/Concentration: attention span and concentration appear shorter than expected for age   Insight:  limited   Judgment: limited   Gait/Station: uses walker   Motor Activity: Patient with tardive dyskinesia with tongue and mouth movements     Progress Toward Goals: Progressing    Recommended Treatment: Continue with pharmacotherapy, group therapy, milieu therapy and occupational therapy. Continue frequent safety checks and vitals per unit protocol. Continue with medical management as indicated. Continue coordinating with case management regarding disposition  1. We will have dietary last nutrition talk with patient about diet. Prazosin 1 mg at bedtime for nightmares.   2.  Discharge planning      Risks, benefits and possible side effects of Medications: Risks, benefits, and possible side effects of medications have been explained to the patient, who verbalizes understanding      Current Medications:  Current Facility-Administered Medications   Medication Dose Route Frequency Provider Last Rate    acetaminophen  650 mg Oral Q4H PRN Martha L Sampson, PA-C      acetaminophen  975 mg Oral Q6H PRN Martha L Sampson PA-C      aluminum-magnesium hydroxide-simethicone  30 mL Oral Q4H PRN Maribel Padilla MD      amLODIPine  10 mg Oral Daily Maribel Padilla MD      aspirin  81 mg Oral Daily Martha Braden PA-C      atorvastatin  40 mg Oral Daily With Dinner Maribel Padilla MD      [START ON 12/10/2023] cholecalciferol  1,000 Units Oral Daily Martha Braden, PA-C      clonazePAM  0.5 mg Oral Daily Rudy York DO      clonazePAM  1 mg Oral HS Rudy York DO      [START ON 10/21/2023] cyanocobalamin  1,000 mcg Intramuscular Q30 Days Martha Braden PA-C      [START ON 10/21/2023] ergocalciferol  50,000 Units Oral Weekly Martha Braden PA-C      ferrous sulfate  325 mg Oral Every Other Day Martha Braden PA-C      haloperidol lactate  5 mg Intramuscular Q4H PRN Max 4/day Maribel Padilla MD      hydrOXYzine HCL  25 mg Oral Q6H PRN Max 4/day Maribel Padilla MD      influenza vaccine  0.5 mL Intramuscular Prior to discharge Kendy Talley MD      LORazepam  1 mg Intramuscular Q6H PRN Max 3/day Maribel Padilla MD      melatonin  6 mg Oral HS Rudy A Ekaterinayson, DO      nicotine polacrilex  4 mg Oral Q2H PRN Maribel Padilla MD      pantoprazole  40 mg Oral Early Morning Maribel Padilla MD      polyethylene glycol  17 g Oral Daily PRN Martha Braden PA-C      pregabalin  100 mg Oral TID Mairbel Padilla MD      QUEtiapine  200 mg Oral HS Tameka Huddle, DO      risperiDONE  0.25 mg Oral Q4H PRN Max 6/day Maribel Padilla MD      risperiDONE  0.5 mg Oral Q4H PRN Max 3/day Maribel Padilla MD      risperiDONE  1 mg Oral Q2H PRN Max 3/day Maribel Padilla MD      sertraline  50 mg Oral Daily Rudy A Prayson, DO      traMADol  50 mg Oral Q8H PRN Martha Braden PA-C      Valbenazine Tosylate  40 mg Oral Daily Maribel Padilla MD         Behavioral Health Medications:   all current active meds have been reviewed. Laboratory results:  I have personally reviewed all pertinent laboratory/tests results.    Recent Results (from the past 48 hour(s))   TSH    Collection Time: 10/18/23  5:26 PM   Result Value Ref Range    TSH 3RD GENERATON 0.765 0.450 - 4.500 uIU/mL   Vitamin B12    Collection Time: 10/19/23  5:55 AM   Result Value Ref Range    Vitamin B-12 408 180 - 914 pg/mL   Folate    Collection Time: 10/19/23  5:55 AM   Result Value Ref Range    Folate 9.1 >5.9 ng/mL   Vitamin D 25 hydroxy    Collection Time: 10/19/23  5:55 AM   Result Value Ref Range    Vit D, 25-Hydroxy 18.4 (L) 30.0 - 100.0 ng/mL   Basic metabolic panel    Collection Time: 10/19/23  5:55 AM   Result Value Ref Range    Sodium 139 135 - 147 mmol/L    Potassium 3.6 3.5 - 5.3 mmol/L    Chloride 103 96 - 108 mmol/L    CO2 28 21 - 32 mmol/L    ANION GAP 8 mmol/L    BUN 11 5 - 25 mg/dL    Creatinine 0.66 0.60 - 1.30 mg/dL    Glucose 86 65 - 140 mg/dL    Glucose, Fasting 86 65 - 99 mg/dL    Calcium 8.7 8.4 - 10.2 mg/dL    eGFR 97 ml/min/1.73sq m   CBC and differential    Collection Time: 10/19/23  5:55 AM   Result Value Ref Range    WBC 3.39 (L) 4.31 - 10.16 Thousand/uL    RBC 4.08 3.81 - 5.12 Million/uL    Hemoglobin 12.9 11.5 - 15.4 g/dL    Hematocrit 38.6 34.8 - 46.1 %    MCV 95 82 - 98 fL    MCH 31.6 26.8 - 34.3 pg    MCHC 33.4 31.4 - 37.4 g/dL    RDW 13.4 11.6 - 15.1 %    MPV 9.7 8.9 - 12.7 fL    Platelets 697 350 - 421 Thousands/uL    nRBC 0 /100 WBCs    Neutrophils Relative 35 (L) 43 - 75 %    Immat GRANS % 0 0 - 2 %    Lymphocytes Relative 51 (H) 14 - 44 %    Monocytes Relative 9 4 - 12 %    Eosinophils Relative 4 0 - 6 %    Basophils Relative 1 0 - 1 %    Neutrophils Absolute 1.17 (L) 1.85 - 7.62 Thousands/µL    Immature Grans Absolute 0.01 0.00 - 0.20 Thousand/uL    Lymphocytes Absolute 1.74 0.60 - 4.47 Thousands/µL    Monocytes Absolute 0.29 0.17 - 1.22 Thousand/µL    Eosinophils Absolute 0.15 0.00 - 0.61 Thousand/µL    Basophils Absolute 0.03 0.00 - 0.10 Thousands/µL   Lipid panel    Collection Time: 10/19/23  5:55 AM   Result Value Ref Range    Cholesterol 153 See Comment mg/dL    Triglycerides 70 See Comment mg/dL    HDL, Direct 63 >=50 mg/dL    LDL Calculated 76 0 - 100 mg/dL    Non-HDL-Chol (CHOL-HDL) 90 mg/dl            Javy Narvaez DO 10/20/23

## 2023-10-20 NOTE — PHYSICAL THERAPY NOTE
Physical Therapy Evaluation   Time in: 0740  Time out: 0751  Total evaluation time: 11 minutes    Patient's Name: Samantha Brown    Admitting Diagnosis  Psychiatric problem [F99]  Major depressive disorder [F32.9]    Problem List  Patient Active Problem List   Diagnosis    Chronic pain disorder    Lumbar radiculopathy    Lumbar spondylosis    Fibromyalgia    Spinal stenosis of lumbar region with neurogenic claudication    Bipolar disorder (HCC)    Cognitive disorder    GERD without esophagitis    Generalized anxiety disorder with panic attacks    Essential hypertension    Insomnia    Major depressive disorder, recurrent episode, moderate degree (HCC)    Migraine    Overactive bladder    Panic disorder without agoraphobia    Post traumatic stress disorder    Tremor    Urinary incontinence    Vitamin D deficiency    Post laminectomy syndrome    MIMI (obstructive sleep apnea)    Tardive dyskinesia    Status post insertion of spinal cord stimulator    Ambulatory dysfunction    Dysphagia    Encephalopathy    History of CVA (cerebrovascular accident)    Medical clearance for psychiatric admission    Mixed hyperlipidemia    CVA (cerebral vascular accident) ([address])    Constipation    Hypokalemia    Anemia    Hemiplegia, post-stroke (HCC)    Memory loss    Fall    Witnessed seizure-like activity (HCC)    Stroke-like episode    Numbness    Muscle spasm    Bipolar I disorder, most recent episode depressed ([address])    Depression       Past Medical History  Past Medical History:   Diagnosis Date    Anxiety     Arthritis     left knee    Arthritis of right knee 10/6/2020    At risk for falls     Bipolar 2 disorder ([address])     FOLLOWS WITH PSYCHIATRIST.  CONTINUE LAMOTRIGINE; RESOLVED: [id number]    Depression     Familial tremor     both hands    Fibromyalgia     LAST ASSESSED: [id number]    GERD (gastroesophageal reflux disease)     Hearing aid worn     left ear    [place] (hard of hearing)     left ear    Hyperlipidemia     Hypertension Left-sided weakness     Lumbar disc disease with radiculopathy 2018    Memory loss of unknown cause     long and short term    Migraine     Multiple closed fractures of metatarsal bone of right foot 2021    Obesity     Obesity, Class II, BMI 35-39.9     Osteoarthritis of both hips 10/31/2016    Osteoarthritis of knee 2013    Description: Continue Tylenol and Naproxen. Encourage exercise and weight loss. Patient refused physical therapy. I will refer the patient back to Orthopedics. Overactive bladder     Panic attack     Patellofemoral disorder of both knees 2020    Post traumatic stress disorder     Primary localized osteoarthritis of both knees 2017    Primary osteoarthritis of both knees 2020    S/P insertion of spinal cord stimulator     no remote    S/P total knee arthroplasty, right 3/10/2022    Sacroiliitis ([address]) 2017    Seasonal allergies     Seizure-like activity ([address]) 6/3/2022    Seizures ([address])     possible seizure like activity    Small bowel obstruction ([address]) 3/24/2023    Status post total knee replacement, left 2022    Stroke [facility])     questionable stroke 2009    Tardive dyskinesia     PATIENT STATES    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   CONT ASA 81 MG DAILY; RESOLVED: [id number]    Urinary incontinence     Uses walker     Wears dentures     partial lower / full upper- doesnt wear    Wears glasses        Past Surgical History  Past Surgical History:   Procedure Laterality Date    BACK SURGERY       SECTION      COLONOSCOPY      RESOLVED: [id number]    EAR SURGERY      EGD      HYSTERECTOMY  2004    MYRINGOTOMY W/ TUBES Left     NECK SURGERY  2019    [place] ARTHRP KNE CONDYLE&PLATU MEDIAL&LAT COMPARTMENTS Right 3/9/2022    Procedure: ARTHROPLASTY KNEE TOTAL;  Surgeon: Lauro Titus DO;  [facility];  Service: Orthopedics    [place] ARTHRP KNE CONDYLE&PLATU MEDIAL&LAT COMPARTMENTS Left 2022    Procedure: ARTHROPLASTY KNEE TOTAL;  Surgeon: Mary Banks DO;  [facility];  Service: Orthopedics    [place] CYSTOURETHROSCOPY N/A 2/18/2016    Procedure: CYSTOSCOPY, BOTOX INJECTION;  Surgeon: Juanita Valera MD;  [facility];  Service: Gynecology    [place] INSJ/RPLCMT SPI NPGR DIR/INDUXIVE COUPLING Right 2/10/2021    Procedure: REPLACEMENT IMPLANTABLE PULSE GENERATOR DORSAL SPINAL COLUMN STIMULATOR, RIGHT;  Surgeon: Celestino Johnson MD;  Location: [facility];  Service: Neurosurgery    [place] PRQ [address] NSTIM ELECTRODE ARRAY EPIDURAL Right 7/28/2020    Procedure: INSERTION THORACIC DORSAL COLUMN SPINAL CORD STIMULATOR PERCUTANEOUS W IMPLANTABLE PULSE GENERATOR, RIGHT;  Surgeon: Celestino Johnson MD;  Location[facility];  Service: Neurosurgery    TONSILLECTOMY      TUBAL LIGATION  1986    UPPER GASTROINTESTINAL ENDOSCOPY  09/2020       PT performed at least 2 patient identifiers during session: Name and wristband. 10/20/23 0751   PT Last Visit   PT Visit Date 10/20/23   Note Type   Note type Evaluation   Pain Assessment   Pain Assessment Tool 0-10   Pain Score 7   Pain Location/Orientation Orientation: Bilateral;Orientation: Upper; Location: Leg;Orientation: Lower; Location: Back   Pain Onset/Description Onset: Ongoing;Frequency: Constant/Continuous   Patient's Stated Pain Goal No pain   Hospital Pain Intervention(s) Repositioned; Emotional support; Rest   Restrictions/Precautions   Weight Bearing Precautions Per Order No   Other Precautions Fall Risk;Pain;Cognitive   Home Living   Type of Home Apartment  (2nd floor)   Home Layout Performs ADLs on one level;Stairs to enter with rails  (FOS to access apt level)   Bathroom Shower/Tub Tub/shower unit   Bathroom Toilet Standard   Bathroom Equipment Shower chair  (does not use @ baseline)   [address]  (pt admits to rollator use @ baseline)   Prior Function   Level of [place] Independent with ADLs; Independent with functional mobility; Needs assistance with IADLS   Lives With Son;Family   Receives Help From Family;Personal care attendant  (HHA pt reports 5 hrs/day ("most days"))   IADLs Family/Friend/Other provides transportation; Family/Friend/Other provides meals; Family/Friend/Other provides medication management   Falls in the last 6 months 0   General   Family/Caregiver Present No   Cognition   Arousal/Participation Alert   Orientation Level Oriented X4   Memory Decreased recall of precautions;Decreased recall of recent events   Following Commands Follows one step commands with increased time or repetition   Comments pt pleasant, agreeable to PT session   Subjective   Subjective "I can walk to the dining room"   RLE Assessment   RLE Assessment X   Strength RLE   RLE Overall Strength 3+/5   LLE Assessment   LLE Assessment X   Strength LLE   LLE Overall Strength 3+/5   Vision-Basic Assessment   Current Vision Wears glasses all the time   Coordination   Movements are Fluid and Coordinated 1   Sensation WFL   Bed Mobility   Supine to Sit Unable to assess  (pt received OOB in recliner upon arrival)   Transfers   Sit to Stand 5  Supervision   Additional items Assist x 1; Increased time required   Stand to Sit 5  Supervision   Additional items Assist x 1   Ambulation/Elevation   Gait pattern Improper Weight shift; Forward Flexion;Decreased foot clearance   Gait Assistance 5  Supervision   Additional items Assist x 1   Assistive Device Rolling walker   Distance 140 ft   Stair Management Assistance Not tested   Balance   Static Sitting Good   Dynamic Sitting Fair +   Static Standing Fair   Dynamic Standing Fair -   Ambulatory Fair -   Endurance Deficit   Endurance Deficit Yes   Activity Tolerance   Activity Tolerance Patient limited by fatigue;Patient limited by pain   Medical Staff Made Aware coordination of care provided with OT [address] [place] Yes, RN made aware of outcomes/recs   Assessment   Prognosis Good   Problem List Decreased strength;Decreased endurance; Impaired balance;Decreased mobility;Pain   Assessment Pt is 59 y.o. female seen for high-complexity PT evaluation on 10/20/2023 s/p admit to [address] [place] [address] on 10/19/2023 w/ Generalized anxiety disorder with panic attacks. PT was consulted to assess pt's functional mobility and d/c needs. Order placed for PT eval and tx, w/ up and OOB as tolerated and ambulate patient orders. PTA, pt lives c son and family in 2nd floor apt c FOS to access, amb c rollator @ baseline, (I) ADLs primarily. At time of eval, pt requires SUP for all functional mobility tasks, including amb x 140 ft with use of RW. Upon evaluation, pt presenting with impaired functional mobility d/t decreased strength, decreased endurance, impaired balance, decreased mobility, and pain. Pertinent PMHx and current co-morbidities affecting pt's physical performance at time of assessment include: HTN, HLD, tardive dyskinesia, iron deficiency anemia, DJD/OA, chronic back pain, gait abnormality, GERD. Personal factors affecting pt at time of eval include: decreased initiation and engagement. The following objective measures performed on IE also reveal limitations: AM-PAC 6-Clicks: 29/12. Pt's clinical presentation is currently unstable/unpredictable seen in pt's presentation of need for input for task focus and mobility technique and ongoing medical assessment, moderate-severe low back pain with radiating pain to B LEs. Overall, pt's rehab potential and prognosis to return to PLOF is good as impacted by objective findings, warranting pt to receive further skilled PT interventions to address identified impairments, activity limitation(s), and participation restriction(s). Goal for patient is to get better. Pt to benefit from continued PT tx to address deficits as defined above and maximize level of functional independent mobility and consistency in order for pt to improve activity tolerance.  From PT/mobility standpoint, recommendation at time of d/c would be home with home health rehabilitation pending progress in order to facilitate return to PLOF. Barriers to Discharge Inaccessible home environment;Decreased caregiver support   Goals   Patient Goals "to get better"   [facility] Expiration Date 11/09/23   Short Term Goal #1 In 7-10 days: Increase bilateral LE strength 1/2 grade to facilitate independent mobility, Perform all bed mobility tasks modified independent to decrease caregiver burden, Perform all transfers modified independent to improve independence, Ambulate > 150 ft. with least restrictive assistive device modified independent w/o LOB and w/ normalized gait pattern 100% of the time, Navigate 12 stairs with distant S with unilateral handrail to facilitate return to previous living environment, Increase all balance 1/2 grade to decrease risk for falls, Complete exercise program independently, Tolerate 4 hr OOB to faciliate upright tolerance, and Complete % of the time   PT Treatment Day 0   Plan   Treatment/Interventions Functional transfer training;LE strengthening/ROM; Therapeutic exercise; Endurance training;Elevations; Patient/family training;Equipment eval/education; Bed mobility;Gait training;Spoke to nursing;Spoke to case management   PT Frequency   (2-5x/wk)   Discharge Recommendation   PT Discharge Recommendation Home with home health rehabilitation   Equipment Recommended   (pt encouraged to use RW at this time)   Additional Comments Pt's raw score on the [facility] Basic Mobility inpatient short form is 21, standardized score is 45.55. Patients at this level are likely to benefit from DC to home with no services, however, please refer to therapist recommendation for safe DC planning.    [facility] Basic Mobility Inpatient   Turning in Flat Bed Without Bedrails 4   Lying on Back to Sitting on Edge of Flat Bed Without Bedrails 4   Moving Bed to Chair 4   Standing Up From Chair Using Arms 4   Walk in Room 3   Climb 3-5 Stairs With Railing 2   Basic Mobility Inpatient Raw Score 21   Basic Mobility Standardized Score 45.55   Highest Level Of Mobility   JH-HLM Goal 6: Walk 10 steps or more   JH-HLM Achieved 7: Walk 25 feet or more   End of Consult   Patient Position at End of Consult Bedside chair; All needs within reach  (pt in dining room for morning coffee)       eKra Urrutia, PT, DPT

## 2023-10-20 NOTE — PLAN OF CARE
Problem: OCCUPATIONAL THERAPY ADULT  Goal: Performs self-care activities at highest level of function for planned discharge setting. See evaluation for individualized goals. Description: Treatment Interventions: ADL retraining, UE strengthening/ROM, Functional transfer training, Endurance training, Patient/family training, Energy conservation, Activityengagement     See flowsheet documentation for full assessment, interventions and recommendations. Note: Limitation: Decreased ADL status, Decreased UE strength, Decreased Safe judgement during ADL, Decreased endurance, Decreased high-level ADLs  Prognosis: Good  Assessment: Pt is a 59 y.o. female seen for OT evaluation s/p admit to [facility] on 10/19/2023 w/ Generalized anxiety disorder with panic attacks. Comorbidities affecting pt's functional performance at time of assessment include: PTSD, chronic pain disorder, lumbar radiculopathy, fibromyalgia, spinal stenosis, bipolar disorder, cognitive disorder, HTN, MDD, vit D deficiency. Personal factors affecting pt at time of IE include:steps to enter environment, difficulty performing ADLS, difficulty performing IADLS , limited insight into deficits, decreased initiation and engagement , and health management . Prior to admission, pt was I with ADLs and required A with IADLs. Upon evaluation: the following deficits impact occupational performance: weakness, decreased strength, decreased balance, decreased tolerance, decreased safety awareness, and increased pain. Pt to benefit from continued skilled OT tx while in the hospital to address deficits as defined above and maximize level of functional independence w ADL's and functional mobility. Occupational Performance areas to address include: grooming, bathing/shower, toilet hygiene, dressing, functional mobility, community mobility, and clothing management. From OT standpoint, recommendation at time of d/c would be home with home health OT.      OT Discharge Recommendation: Home with home health rehabilitation     aMrija Ngo MS, OTR/L

## 2023-10-20 NOTE — PROGRESS NOTES
Progress Note - Samantha Jacinto 59 y.o. female MRN: [id number]    Unit/Bed#: [facility] Encounter: [id number]        Subjective:   Patient seen and examined at bedside after reviewing the chart and discussing the case with the caring staff. Patient examined at bedside. Patient states she is concerned about her weight gain. States she gained 10lb weight gain over the past month. Patient otherwise denies any acute symptoms. Physical Exam   Vitals: Blood pressure 122/75, pulse 61, temperature (!) 96.7 °F (35.9 °C), temperature source Temporal, resp. rate 16, height 5' 1" (1.549 m), weight 81.7 kg (180 lb 3.2 oz), SpO2 98 %, not currently breastfeeding. ,Body mass index is 34.05 kg/m². Constitutional: Patient in no acute distress. HEENT: PERR, EOMI, MMM. Cardiovascular: Normal rate and regular rhythm. Pulmonary/Chest: Effort normal and breath sounds normal.   Abdomen: Soft, + BS, NT. Assessment/Plan:  Samantha Jacinto is a(n) 59 y.o. female with panic disorder. 1.  Cardiac with hx HTN, HLD. Continue amlodipine 10 mg daily, atorvastatin 40 mg daily, ASA 81 mg daily. 2. Tardive dyskinesia. Continue home valbenazine tosylate 40 mg daily. 3. Iron deficiency anemia. Patient is on ferrous sulfate 324 mg every other day. 4. DJD/OA/chronic back pain. Continue Lyrica 100 mg 3 times daily. Tylenol for mild/mod pain. Tramadol q8h prn for severe pain. Robaxin prn.  5. Gait abnormality. PT OT consulted for further evaluation. 6. GERD. Patient is on Protonix 40 mg daily. 7. Vitamin D deficiency. Patient started on vitamin D2 50,000 units weekly for 8 weeks followed by vitamin D3 1000 units daily. 8. Vitamin B12 deficiency. Patient started on vitamin B-12 monthly injections. 9. Weight gain. Dietitian to see patient. The patient was discussed with Dr. Moni Vasquez and he is in agreement with the above note.

## 2023-10-20 NOTE — NURSING NOTE
No complaints of pain or "panic attacks". Slept well during most q 7 minute safety checks. No respiratory distress or changes in medical condition. Sleep has been sound and undisturbed. No suicidal ideations noted. Safety maintained via clutter-free environment, ID band, and given non-skid socks. Will continue to monitor.

## 2023-10-21 PROCEDURE — 99232 SBSQ HOSP IP/OBS MODERATE 35: CPT | Performed by: PHYSICIAN ASSISTANT

## 2023-10-21 RX ORDER — PRAZOSIN HYDROCHLORIDE 1 MG/1
1 CAPSULE ORAL
Status: DISCONTINUED | OUTPATIENT
Start: 2023-10-21 | End: 2023-11-10 | Stop reason: HOSPADM

## 2023-10-21 RX ORDER — TRAMADOL HYDROCHLORIDE 50 MG/1
50 TABLET ORAL EVERY 6 HOURS PRN
Status: DISCONTINUED | OUTPATIENT
Start: 2023-10-21 | End: 2023-11-10 | Stop reason: HOSPADM

## 2023-10-21 RX ADMIN — ASPIRIN 81 MG: 81 TABLET, COATED ORAL at 08:24

## 2023-10-21 RX ADMIN — CYANOCOBALAMIN 1000 MCG: 1000 INJECTION, SOLUTION INTRAMUSCULAR; SUBCUTANEOUS at 09:06

## 2023-10-21 RX ADMIN — CLONAZEPAM 0.5 MG: 0.5 TABLET ORAL at 08:24

## 2023-10-21 RX ADMIN — ERGOCALCIFEROL 50000 UNITS: 1.25 CAPSULE, LIQUID FILLED ORAL at 08:25

## 2023-10-21 RX ADMIN — SERTRALINE HYDROCHLORIDE 50 MG: 50 TABLET ORAL at 08:25

## 2023-10-21 RX ADMIN — TRAMADOL HYDROCHLORIDE 50 MG: 50 TABLET, COATED ORAL at 17:06

## 2023-10-21 RX ADMIN — PREGABALIN 100 MG: 100 CAPSULE ORAL at 08:24

## 2023-10-21 RX ADMIN — PREGABALIN 100 MG: 100 CAPSULE ORAL at 17:05

## 2023-10-21 RX ADMIN — TRAMADOL HYDROCHLORIDE 50 MG: 50 TABLET, COATED ORAL at 09:05

## 2023-10-21 RX ADMIN — AMLODIPINE BESYLATE 10 MG: 10 TABLET ORAL at 08:25

## 2023-10-21 RX ADMIN — CLONAZEPAM 1 MG: 1 TABLET ORAL at 21:34

## 2023-10-21 RX ADMIN — PREGABALIN 100 MG: 100 CAPSULE ORAL at 21:34

## 2023-10-21 RX ADMIN — QUETIAPINE 200 MG: 50 TABLET, FILM COATED, EXTENDED RELEASE ORAL at 21:33

## 2023-10-21 RX ADMIN — PANTOPRAZOLE SODIUM 40 MG: 40 TABLET, DELAYED RELEASE ORAL at 06:01

## 2023-10-21 RX ADMIN — ATORVASTATIN CALCIUM 40 MG: 40 TABLET, FILM COATED ORAL at 17:05

## 2023-10-21 RX ADMIN — Medication 6 MG: at 21:34

## 2023-10-21 RX ADMIN — PRAZOSIN HYDROCHLORIDE 1 MG: 1 CAPSULE ORAL at 21:34

## 2023-10-21 RX ADMIN — HYDROXYZINE HYDROCHLORIDE 25 MG: 25 TABLET ORAL at 13:21

## 2023-10-21 NOTE — PROGRESS NOTES
Progress Note - Behavioral Health     Samantha Bonilla 59 y.o. female MRN: [id number]   Unit/Bed#: [facility] 200-01 Encounter: [id number]    Behavior over the last 24 hours: unchanged. Samantha seen in office. Continues to be anxious; feels like she will panic as well as shortness of breath, "can't move"; increased urination, headache. Pain in back and legs exacerbates anxiety. States prn atarax minimally effective today. Upset about weight gain. Sleep: normal  Appetite: normal  Medication side effects: c/o weight gain  ROS:  as above    Mental Status Evaluation:    Appearance:  age appropriate   Behavior:  cooperative   Speech:  normal rate and volume   Mood:  Low, anxious   Affect:  constricted   Thought Process:  goal directed   Associations: intact associations   Thought Content:  no overt delusions   Perceptual Disturbances: none   Risk Potential: Suicidal ideation - None at present  Homicidal ideation - None at present   Sensorium:  oriented to person, place, and time/date   Memory:  recent and remote memory grossly intact   Consciousness:  alert and awake   Attention: attention span and concentration are age appropriate   Insight:  limited   Judgment: limited   Gait/Station: uses walker   Motor Activity: Oral TD     Vital signs in last 24 hours:    Temp:  [97.1 °F (36.2 °C)-97.5 °F (36.4 °C)] 97.1 °F (36.2 °C)  HR:  [60-75] 64  Resp:  [18] 18  BP: (105-121)/(66-77) 114/66    Laboratory results: I have personally reviewed all pertinent laboratory/tests results. Assessment/Plan   Principal Problem:    Generalized anxiety disorder with panic attacks  Active Problems:    Post traumatic stress disorder    Depression    Recommended Treatment: cont present tx      Planned medication and treatment changes:  no mec change- zoloft 50 mg/d, klonopin 0.5/1 mg, prazosin 1 mg q bedtime, seroquel xr 200 mg q bedtime, valbenazine 40 mg/d.      All current active medications have been reviewed  Encourage group therapy, milieu therapy and occupational therapy  Behavioral Health checks every 7 minutes  Current Facility-Administered Medications   Medication Dose Route Frequency Provider Last Rate    acetaminophen  650 mg Oral Q4H PRN Martha Braden PA-C      acetaminophen  975 mg Oral Q6H PRN Martha Braden PA-C      aluminum-magnesium hydroxide-simethicone  30 mL Oral Q4H PRN Analy Cooney MD      amLODIPine  10 mg Oral Daily Analy Cooney MD      aspirin  81 mg Oral Daily Martha Braden PA-C      atorvastatin  40 mg Oral Daily With Dinner MD Obey Jade ON 12/10/2023] cholecalciferol  1,000 Units Oral Daily Martha Braden PA-C      clonazePAM  0.5 mg Oral Daily Rudy A Prayson,       clonazePAM  1 mg Oral HS Rudy A Prayson, DO      cyanocobalamin  1,000 mcg Intramuscular Q30 Days Martha Braden PA-C      ergocalciferol  50,000 Units Oral Weekly Martha Braden PA-C      ferrous sulfate  325 mg Oral Every Other Day Martha Braden PA-C      haloperidol lactate  5 mg Intramuscular Q4H PRN Max 4/day Analy Cooney MD      hydrOXYzine HCL  25 mg Oral Q6H PRN Max 4/day Analy Cooney MD      influenza vaccine  0.5 mL Intramuscular Prior to discharge Linette Gibson MD      LORazepam  1 mg Intramuscular Q6H PRN Max 3/day Analy Cooney MD      melatonin  6 mg Oral HS Davey Santamaria,       nicotine polacrilex  4 mg Oral Q2H PRN Analy Cooney MD      pantoprazole  40 mg Oral Early Morning Analy Cooney MD      polyethylene glycol  17 g Oral Daily PRN Martha Braden PA-C      prazosin  1 mg Oral HS Rudy A Ekaterinayson,       pregabalin  100 mg Oral TID Analy Cooney MD      QUEtiapine  200 mg Oral HS Rudy A Prayson, DO      risperiDONE  0.25 mg Oral Q4H PRN Max 6/day Analy Cooney MD      risperiDONE  0.5 mg Oral Q4H PRN Max 3/day Analy Cooney MD      risperiDONE  1 mg Oral Q2H PRN Max 3/day Analy Cooney MD      sertraline  50 mg Oral Daily Rudy York DO      traMADol  50 mg Oral Q8H PRN Martha Braden PA-C      Valbenazine Tosylate  40 mg Oral Daily Mahendra Yeh MD         Risks / Benefits of Treatment:    Risks, benefits, and possible side effects of medications explained to patient and patient verbalizes understanding and agreement for treatment. Counseling / Coordination of Care:    Patient's progress discussed with staff in treatment team meeting. Medications, treatment progress and treatment plan reviewed with patient.     Vanesa Apple PA-C 10/21/23

## 2023-10-21 NOTE — NURSING NOTE
Patient visible on the unit. Pleasant and cooperative. Social with peers. Denies SI, HI, hallucinations,. Endorses mild anxiety and depression. Compliant with medications. Able to make needs known. Received Tramadol for pain with good results. Q 7 minute checks maintained. Will continue to monitor and access.

## 2023-10-21 NOTE — PROGRESS NOTES
Progress Note - Samantha Leonard 59 y.o. female MRN: [id number]    Unit/Bed#: [facility] Encounter: [id number]        Subjective:   Patient seen and examined at bedside after reviewing the chart and discussing the case with the caring staff. Patient examined at bedside. Patient is complaining that her pain is poorly controlled with the current dose of tramadol. Patient quest that if he can give her more frequently. Continues to be obsessed about her weight and wants weight loss medication. Physical Exam   Vitals: Blood pressure 114/66, pulse 64, temperature (!) 97.1 °F (36.2 °C), temperature source Temporal, resp. rate 18, height 5' 1" (1.549 m), weight 81.7 kg (180 lb 3.2 oz), SpO2 100 %, not currently breastfeeding. ,Body mass index is 34.05 kg/m². Constitutional: Patient in no acute distress. HEENT: PERR, EOMI, MMM. Cardiovascular: Normal rate and regular rhythm. Pulmonary/Chest: Effort normal and breath sounds normal.   Abdomen: Soft, + BS, NT. Assessment/Plan:  Samantha Leonard is a(n) 59 y.o. female with panic disorder. 1.  Cardiac with hx HTN, HLD. Continue amlodipine 10 mg daily, atorvastatin 40 mg daily, ASA 81 mg daily. 2. Tardive dyskinesia. Continue home valbenazine tosylate 40 mg daily. 3. Iron deficiency anemia. Patient is on ferrous sulfate 324 mg every other day. 4. DJD/OA/chronic back pain. Continue Lyrica 100 mg 3 times daily. Tylenol for mild/mod pain. I will increase tramadol q6h prn for severe pain. Robaxin prn.  5. Gait abnormality. PT OT consulted for further evaluation. 6. GERD. Patient is on Protonix 40 mg daily. 7. Vitamin D deficiency. Patient started on vitamin D2 50,000 units weekly for 8 weeks followed by vitamin D3 1000 units daily. 8. Vitamin B12 deficiency. Patient started on vitamin B-12 monthly injections. 9. Weight gain. Dietitian to see patient.

## 2023-10-21 NOTE — PLAN OF CARE
Problem: Ineffective Coping  Goal: Cooperates with admission process  Description: Interventions:   - Complete admission process  Outcome: Completed  Goal: Understands least restrictive measures  Description: Interventions:  - Utilize least restrictive behavior  Outcome: Progressing  Goal: Free from restraint events  Description: - Utilize least restrictive measures   - Provide behavioral interventions   - Redirect inappropriate behaviors   Outcome: Progressing

## 2023-10-21 NOTE — ED NOTES
COORDINATION OF BENEFITS:  Phone call placed to [facility]. Phone number: [phone]. Spoke to Trini Gross. Level of care: [address] Authorization # [id number]. Fax discharge paperwork to: [phone].

## 2023-10-21 NOTE — NURSING NOTE
Patient seen at the [facility] and social. She is calm and stable. Walks around with a walker. Atarax was effective for her anxiety. Compliant with morning and evening medications. No complaints of S/I,H/I and AH. Denies any pain at present. Positive for snacks. Maintain on q7 min safety checks. Will continue to monitor.

## 2023-10-22 PROCEDURE — 99232 SBSQ HOSP IP/OBS MODERATE 35: CPT

## 2023-10-22 RX ADMIN — CLONAZEPAM 1 MG: 1 TABLET ORAL at 21:11

## 2023-10-22 RX ADMIN — ATORVASTATIN CALCIUM 40 MG: 40 TABLET, FILM COATED ORAL at 16:51

## 2023-10-22 RX ADMIN — PRAZOSIN HYDROCHLORIDE 1 MG: 1 CAPSULE ORAL at 21:11

## 2023-10-22 RX ADMIN — AMLODIPINE BESYLATE 10 MG: 10 TABLET ORAL at 08:24

## 2023-10-22 RX ADMIN — PANTOPRAZOLE SODIUM 40 MG: 40 TABLET, DELAYED RELEASE ORAL at 06:24

## 2023-10-22 RX ADMIN — PREGABALIN 100 MG: 100 CAPSULE ORAL at 21:12

## 2023-10-22 RX ADMIN — QUETIAPINE 200 MG: 50 TABLET, FILM COATED, EXTENDED RELEASE ORAL at 21:11

## 2023-10-22 RX ADMIN — TRAMADOL HYDROCHLORIDE 50 MG: 50 TABLET, COATED ORAL at 08:23

## 2023-10-22 RX ADMIN — ASPIRIN 81 MG: 81 TABLET, COATED ORAL at 08:24

## 2023-10-22 RX ADMIN — Medication 6 MG: at 21:11

## 2023-10-22 RX ADMIN — FERROUS SULFATE TAB 325 MG (65 MG ELEMENTAL FE) 325 MG: 325 (65 FE) TAB at 08:24

## 2023-10-22 RX ADMIN — HYDROXYZINE HYDROCHLORIDE 25 MG: 25 TABLET ORAL at 17:48

## 2023-10-22 RX ADMIN — SERTRALINE HYDROCHLORIDE 50 MG: 50 TABLET ORAL at 08:24

## 2023-10-22 RX ADMIN — PREGABALIN 100 MG: 100 CAPSULE ORAL at 16:51

## 2023-10-22 RX ADMIN — CLONAZEPAM 0.5 MG: 0.5 TABLET ORAL at 08:24

## 2023-10-22 RX ADMIN — TRAMADOL HYDROCHLORIDE 50 MG: 50 TABLET, COATED ORAL at 18:02

## 2023-10-22 RX ADMIN — PREGABALIN 100 MG: 100 CAPSULE ORAL at 08:24

## 2023-10-22 NOTE — NURSING NOTE
Pt complained of " heart pounding," and  of being "shaky." Pt was medicated with PRN atarax 25 mg for mild anxiety. Will monitor for effectiveness.

## 2023-10-22 NOTE — NURSING NOTE
Patient is pleasant, calm, and cooperative throughout the afternoon. She is socializing with peers in the community. Able to make needs known. No complaints at this time.

## 2023-10-22 NOTE — NURSING NOTE
Patient observed in small dining room. Pleasant, social with staff and peers. Walks with walker. Endorses mild to moderate anxiety and depression. Denies SI, HI, A/V/H. Complains of itching of back and arms, no rash noted. Compliant with evening medications. Q 7 minute safety checks maintained.

## 2023-10-22 NOTE — NURSING NOTE
Pt up ad dudley with walker. Pt medicated for c/o lower back pain with Ultram po @ 0823 with relief obtained. BLEPS assessment completed. Pt c/o moderate depression & moderate anxiety. Pt denies any hallucinations, suicidal or homicidal ideations. Q 7 min checks maintained to monitor pt's behavior & safety. Pt is cooperative & compliant with medications. +1 edema noted BLE. Lungs clear upon auscultation. Pt is pleasant & socializes with other patients.

## 2023-10-22 NOTE — PROGRESS NOTES
Progress Note - Behavioral Health   Samantha Limon 59 y.o. female MRN: [id number]  Unit/Bed#: [facility] Encounter: [id number]    Assessment/Plan   Principal Problem:    Generalized anxiety disorder with panic attacks  Active Problems:    Post traumatic stress disorder    Depression      Recommended Treatment:   No psychopharmacologic changes necessary at this moment; will continue to assess daily for further optimization. Continue with pharmacotherapy, group therapy, milieu therapy and occupational therapy. Continue to assess for adverse medication side effects. Encourage Samantha Limon to participate in nonverbal forms of therapy including journaling and art/music therapy. Continue frequent safety checks and vitals per unit protocol. Continue to engage CM/SW to assist with collateral, disposition planning, and the implementation of an individualized, patient-centered plan of care. Continue medical management by medical team.  Case discussed with treatment team.    Legal Status: 201  ------------------------------------------------------------    Subjective: All documentation including nursing notes, medication history to ensure medication adherence on the unit, labs, and vitals were reviewed. Samantha was evaluated this morning for continuity of care and no acute distress noted throughout the evaluation. Over the past 24 hours per nursing report, Samantha has been cooperative on the unit and compliant with medications. Today, Samantha is consenting for safety on the unit. Samantha reports feeling "ok." Samantha notes having good sleep. Samantha states having a good appetite. Samantha has been taking the medications as prescribed and reporting no side effects. Patient discussed looking forward to PT, discussed her back brace. Samantha denies suicidal ideations. Samantha denies homicidal ideations.  Regarding hallucinations, Samantha denies    PRNs overnight: atarax for anxiety, tramadol for pain   VS: Reviewed, within normal limits    Progress Toward Goals: slow improvement    Psychiatric Review of Systems:  Behavior over the last 24 hours:  improved  Sleep: normal  Appetite: normal  Medication side effects: No   ROS: all other systems are negative    Vital signs in last 24 hours:  Temp:  [97.1 °F (36.2 °C)-97.9 °F (36.6 °C)] 97.5 °F (36.4 °C)  HR:  [64-81] 78  Resp:  [18] 18  BP: (114-124)/(66-81) 120/81    Laboratory results:  I have personally reviewed all pertinent laboratory/tests results. No results found for this or any previous visit (from the past 48 hour(s)).       Mental Status Evaluation:    Appearance:  casually dressed, adequate grooming, looks older than stated age, overweight   Behavior:  pleasant, cooperative   Speech:  normal rate and volume, articulation error   Mood:  "ok"   Affect:  brighter   Thought Process:  coherent, goal directed, linear   Associations: intact associations   Thought Content:  no overt delusions   Perceptual Disturbances: Denies auditory or visual hallucinations and Does not appear to be responding to internal stimuli   Risk Potential: Suicidal ideation - None at present  Homicidal ideation - None at present  Potential for aggression - No   Sensorium:  oriented to person, place, and situation   Memory:  recent and remote memory grossly intact   Consciousness:  alert and awake   Attention/Concentration: attention span and concentration are age appropriate   Insight:  limited   Judgment: limited   Gait/Station: in wheelchair   Motor Activity: Chronic TD for patient with tongue movements primarily       Current Medications:  Current Facility-Administered Medications   Medication Dose Route Frequency Provider Last Rate    acetaminophen  650 mg Oral Q4H PRN Martha Braden PA-C      acetaminophen  975 mg Oral Q6H PRN Martha Braden PA-C      aluminum-magnesium hydroxide-simethicone  30 mL Oral Q4H PRN Marco Reinoso MD      amLODIPine  10 mg Oral Daily Meeta RIOS Neli Durbin MD      aspirin  81 mg Oral Daily Martha Braden PA-C      atorvastatin  40 mg Oral Daily With Dinner Simone Kaye MD      [START ON 12/10/2023] cholecalciferol  1,000 Units Oral Daily Martha Braden PA-C      clonazePAM  0.5 mg Oral Daily Rudy A Prayson, DO      clonazePAM  1 mg Oral HS Rudy A Prayson, DO      cyanocobalamin  1,000 mcg Intramuscular Q30 Days Martha Braden PA-C      ergocalciferol  50,000 Units Oral Weekly Martha Braden PA-C      ferrous sulfate  325 mg Oral Every Other Day Martha Braden PA-C      haloperidol lactate  5 mg Intramuscular Q4H PRN Max 4/day Simone Kaye MD      hydrOXYzine HCL  25 mg Oral Q6H PRN Max 4/day Simone Kaye MD      influenza vaccine  0.5 mL Intramuscular Prior to discharge Liss Palacios MD      LORazepam  1 mg Intramuscular Q6H PRN Max 3/day Simone Kaye MD      melatonin  6 mg Oral HS Fred Freeze, DO      nicotine polacrilex  4 mg Oral Q2H PRN Simone Kaye MD      pantoprazole  40 mg Oral Early Morning Simone Kaye MD      polyethylene glycol  17 g Oral Daily PRN Martha Bradne PA-C      prazosin  1 mg Oral HS Rudy A Prayson, DO      pregabalin  100 mg Oral TID Simnoe Kaye MD      QUEtiapine  200 mg Oral HS Fred Freeze, DO      risperiDONE  0.25 mg Oral Q4H PRN Max 6/day Simone Kaye MD      risperiDONE  0.5 mg Oral Q4H PRN Max 3/day Simone Kaye MD      risperiDONE  1 mg Oral Q2H PRN Max 3/day Simone Kaye MD      sertraline  50 mg Oral Daily Fred Freeze, DO      traMADol  50 mg Oral Q6H PRN Liss Palacios MD      Valbenazine Tosylate  40 mg Oral Daily Simone Kaye MD         Suicide/Homicide Risk Assessment:  Risk of Harm to Self:   Based on today's assessment, Samantha presents the following risk of harm to self: moderate    Risk of Harm to Others:  Based on today's assessment, Samantha presents the following risk of harm to others: minimal    The following interventions are recommended: behavioral checks every 7 minutes, continued hospitalization on locked unit    Behavioral Health Medications: All current active meds have been reviewed. Changes as in plan section above. Risks, benefits and possible side effects of Medications:   Risks, benefits, and possible side effects of medications explained to patient and patient verbalizes understanding. Counseling / Coordination of Care:  Patient's progress discussed with staff in treatment team meeting. Medications, treatment progress and treatment plan reviewed with patient. Michel Harrison,  10/22/23      This note was completed in part utilizing [employer]. Grammatical, translation, syntax errors, random word insertions, spelling mistakes, and incomplete sentences may be an occasional consequence of this system secondary to software limitations with voice recognition, ambient noise, and hardware issues. If you have any questions or concerns about the content, text, or information contained within the body of this dictation, please contact the provider for clarification.

## 2023-10-22 NOTE — PLAN OF CARE
Problem: Anxiety  Goal: Anxiety is at manageable level  Description: Interventions:  - Assess and monitor patient's anxiety level. - Monitor for signs and symptoms (heart palpitations, chest pain, shortness of breath, headaches, nausea, feeling jumpy, restlessness, irritable, apprehensive). - Collaborate with interdisciplinary team and initiate plan and interventions as ordered.   - [place] patient to unit/surroundings  - Explain treatment plan  - Encourage participation in care  - Encourage verbalization of concerns/fears  - Identify coping mechanisms  - Assist in developing anxiety-reducing skills  - Administer/offer alternative therapies  - Limit or eliminate stimulants  Outcome: Progressing   See nurse note

## 2023-10-22 NOTE — PROGRESS NOTES
Progress Note - Samantha Yates 59 y.o. female MRN: [id number]    Unit/Bed#: [facility] Tello Encounter: [id number]        Subjective:   Patient seen and examined at bedside after reviewing the chart and discussing the case with the caring staff. Patient examined at bedside. Patient pain is better controlled with the tramadol every 6 hours as needed. Continues to be obsessed about her weight and wants weight loss medication. Physical Exam   Vitals: Blood pressure 120/81, pulse 78, temperature 97.5 °F (36.4 °C), temperature source Temporal, resp. rate 18, height 5' 1" (1.549 m), weight 81.7 kg (180 lb 3.2 oz), SpO2 98 %, not currently breastfeeding. ,Body mass index is 34.05 kg/m². Constitutional: Patient in no acute distress. HEENT: PERR, EOMI, MMM. Cardiovascular: Normal rate and regular rhythm. Pulmonary/Chest: Effort normal and breath sounds normal.   Abdomen: Soft, + BS, NT. Assessment/Plan:  Samantha Yates is a(n) 59 y.o. female with panic disorder. 1.  Cardiac with hx HTN, HLD. Continue amlodipine 10 mg daily, atorvastatin 40 mg daily, ASA 81 mg daily. 2. Tardive dyskinesia. Continue home valbenazine tosylate 40 mg daily. 3. Iron deficiency anemia. Patient is on ferrous sulfate 324 mg every other day. 4. DJD/OA/chronic back pain. Continue Lyrica 100 mg 3 times daily. Tylenol for mild/mod pain. I will increase tramadol q6h prn for severe pain. Robaxin prn.  5. Gait abnormality. PT OT consulted for further evaluation. 6. GERD. Patient is on Protonix 40 mg daily. 7. Vitamin D deficiency. Patient started on vitamin D2 50,000 units weekly for 8 weeks followed by vitamin D3 1000 units daily. 8. Vitamin B12 deficiency. Patient started on vitamin B-12 monthly injections. 9. Weight gain. Dietitian to see patient.

## 2023-10-22 NOTE — PROGRESS NOTES
10/22/23 1802   Pain Assessment   Pain Assessment Tool 0-10   Pain Score 9   Pain Location/Orientation Location: Back   Hospital Pain Intervention(s) Medication (See MAR)     Patient reporting 9/10 back pain and is requesting PRN tramadol. 50mg administered at 1802 for severe pain.

## 2023-10-23 PROCEDURE — 99232 SBSQ HOSP IP/OBS MODERATE 35: CPT | Performed by: PSYCHIATRY & NEUROLOGY

## 2023-10-23 PROCEDURE — 97116 GAIT TRAINING THERAPY: CPT

## 2023-10-23 RX ORDER — CLONAZEPAM 1 MG/1
1 TABLET ORAL DAILY
Status: DISCONTINUED | OUTPATIENT
Start: 2023-10-24 | End: 2023-11-06

## 2023-10-23 RX ADMIN — CLONAZEPAM 0.5 MG: 0.5 TABLET ORAL at 08:33

## 2023-10-23 RX ADMIN — PREGABALIN 100 MG: 100 CAPSULE ORAL at 08:33

## 2023-10-23 RX ADMIN — SERTRALINE HYDROCHLORIDE 50 MG: 50 TABLET ORAL at 08:33

## 2023-10-23 RX ADMIN — PRAZOSIN HYDROCHLORIDE 1 MG: 1 CAPSULE ORAL at 21:55

## 2023-10-23 RX ADMIN — CLONAZEPAM 1 MG: 1 TABLET ORAL at 21:55

## 2023-10-23 RX ADMIN — PANTOPRAZOLE SODIUM 40 MG: 40 TABLET, DELAYED RELEASE ORAL at 05:49

## 2023-10-23 RX ADMIN — TRAMADOL HYDROCHLORIDE 50 MG: 50 TABLET, COATED ORAL at 05:49

## 2023-10-23 RX ADMIN — Medication 6 MG: at 21:55

## 2023-10-23 RX ADMIN — QUETIAPINE 200 MG: 50 TABLET, FILM COATED, EXTENDED RELEASE ORAL at 21:55

## 2023-10-23 RX ADMIN — TRAMADOL HYDROCHLORIDE 50 MG: 50 TABLET, COATED ORAL at 22:22

## 2023-10-23 RX ADMIN — PREGABALIN 100 MG: 100 CAPSULE ORAL at 21:55

## 2023-10-23 RX ADMIN — ATORVASTATIN CALCIUM 40 MG: 40 TABLET, FILM COATED ORAL at 15:59

## 2023-10-23 RX ADMIN — PREGABALIN 100 MG: 100 CAPSULE ORAL at 15:59

## 2023-10-23 RX ADMIN — ASPIRIN 81 MG: 81 TABLET, COATED ORAL at 08:33

## 2023-10-23 NOTE — PLAN OF CARE
Problem: PHYSICAL THERAPY ADULT  Goal: Performs mobility at highest level of function for planned discharge setting. See evaluation for individualized goals. Description: Treatment/Interventions: Functional transfer training, LE strengthening/ROM, Therapeutic exercise, Endurance training, Elevations, Patient/family training, Equipment eval/education, Bed mobility, Gait training, Spoke to nursing, Spoke to case management  Equipment Recommended:  (pt encouraged to use RW at this time)       See flowsheet documentation for full assessment, interventions and recommendations. Outcome: Progressing  Note: Prognosis: Good  Problem List: Decreased strength, Decreased endurance, Impaired balance, Decreased mobility, Pain  Assessment: Pt seen for PT treatment session this date with interventions consisting of gait training w/ emphasis on improving pt's ability to ambulate level surfaces x 150 ft with distant S provided by therapist with RW and therapeutic activity consisting of training: sit<>stand transfers. Pt agreeable to PT treatment session upon arrival, pt found seated OOB in chair, in no apparent distress and responsive. In comparison to previous session, pt with improvements in distance ambulated . Post session: all needs in reach and RN notified of session findings/recommendations. Continue to recommend home with home health rehabilitation at time of d/c in order to maximize pt's functional independence and safety w/ mobility. Pt continues to be functioning below baseline level. PT will continue to see pt during current hospitalization in order to address the deficits listed above and provide interventions consistent w/ POC in effort to achieve STGs. Barriers to Discharge: Inaccessible home environment, Decreased caregiver support     PT Discharge Recommendation: Home with home health rehabilitation    See flowsheet documentation for full assessment.

## 2023-10-23 NOTE — NURSING NOTE
Upon reassessment of medication, patient reports feeling less shaky and is verbalizing less anxiety at this time. Medication seemingly effective.

## 2023-10-23 NOTE — TREATMENT TEAM
10/22/23 6527   Pain Assessment Post Intervention   Pain Assessment Tool Used: 0-10   Post Intervention Pain Score 7   Post Intervention Pain Location/Orientation Location: Back   Post Intervention POSS 1   Response to Interventions Patient reports some relief     Medication effective.

## 2023-10-23 NOTE — NURSING NOTE
Patient withdrawn to room. Pleasant and cooperative, reading a book. Denies SI, HI, A/V/H. Reports mild anxiety and depression. Pain in back ongoing. Will continue to monitor and access. Q 7 minute safety checks maintained.

## 2023-10-23 NOTE — NURSING NOTE
Patient visible in milieu, pleasant and cooperative in interaction. Social with staff and peers. Patient endorses moderate anxiety/depression, denies SI/HI, hallucinations. Patient attributes increased anxiety/depression to "bad dreams". Patient unable to state what dreams are due to  not remembering them. Remains medication compliant and on continuous rounding for safety and behaviors.

## 2023-10-23 NOTE — H&P
Progress Note - Behavioral Health     Samantha Hernandez Serum 59 y.o. female MRN: [id number]   Unit/Bed#: [facility] 200-01 Encounter: [id number]    Behavior over the last 24 hours: unchanged. Samantha was seen today in follow-up. Per staff, she continues to endorse ongoing moderate anxiety and depression symptoms. Did report to an episode of a panic attack which led to the utilization of PRN atarax 25 mg yesterday at 1748. Did request tramadol 50 mg at 0549 for severe pain. Today she is seen resting in bed. She does continue to have orofacial dyskinetic movements, she is prescribed Martha Gross however this is not currently on hospital formulary. Unfortunately, she states that she has no one to come bring this medication for her from home. Usually when taking it, she states that this is helpful but currently her involuntary movements have been manageable. She has continued to endorse ongoing anxiety and panic symptoms without any identifiable trigger. We did discuss increase in Klonopin to help with symptom management, however patient was educated to return for any worsening sedative effects. She did not mention any worsening nightmares today. No worsening episodes of tearfulness on exam.  No overt psychosis or paranoia.        Sleep: normal  Appetite: normal  Medication side effects: No   ROS: no complaints, all other systems are negative    Mental Status Evaluation:    Appearance:  age appropriate, casually dressed, dressed appropriately   Behavior:  cooperative   Speech:  dysarthric   Mood:  depressed, anxious   Affect:  mood-congruent   Thought Process:  goal directed   Associations: intact associations   Thought Content:  negative thoughts, ruminating thoughts   Perceptual Disturbances: no auditory hallucinations, no visual hallucinations   Risk Potential: Suicidal ideation - None at present  Homicidal ideation - None at present  Potential for aggression - No   Sensorium:  oriented to person, place, and time/date Memory:  recent and remote memory grossly intact   Consciousness:  alert and awake   Attention/Concentration: attention span and concentration are age appropriate   Insight:  limited   Judgment: limited   Gait/Station: normal gait/station   Motor Activity: no abnormal movements     Vital signs in last 24 hours:    Temp:  [97.4 °F (36.3 °C)-97.6 °F (36.4 °C)] 97.5 °F (36.4 °C)  HR:  [63-71] 65  Resp:  [18] 18  BP: (105-118)/(55-66) 105/55    Laboratory results: I have personally reviewed all pertinent laboratory/tests results    Results from the past 24 hours: No results found for this or any previous visit (from the past 24 hour(s)). Most Recent Labs:   Lab Results   Component Value Date    WBC 3.39 (L) 10/19/2023    RBC 4.08 10/19/2023    HGB 12.9 10/19/2023    HCT 38.6 10/19/2023     10/19/2023    RDW 13.4 10/19/2023    NEUTROABS 1.17 (L) 10/19/2023    TOTANEUTABS 0.83 (L) 05/25/2023    SODIUM 139 10/19/2023    K 3.6 10/19/2023     10/19/2023    CO2 28 10/19/2023    BUN 11 10/19/2023    CREATININE 0.66 10/19/2023    GLUC 86 10/19/2023    CALCIUM 8.7 10/19/2023    AST 24 10/18/2023    ALT 27 10/18/2023    ALKPHOS 102 10/18/2023    TP 6.2 (L) 10/18/2023    ALB 4.3 10/18/2023    TBILI 0.76 10/18/2023    CHOLESTEROL 153 10/19/2023    HDL 63 10/19/2023    TRIG 70 10/19/2023    LDLCALC 76 10/19/2023    [address] 90 10/19/2023    LITHIUM <0.1 (L) 03/23/2023    AMMONIA <10 (L) 06/27/2021    [id number] 0.765 10/18/2023    FREET4 0.80 02/13/2020    PREGSERUM Negative 02/22/2014    HGBA1C 4.5 07/19/2023    EAG 82 07/19/2023       Progress Toward Goals: progressing    Assessment/Plan   Principal Problem:    Generalized anxiety disorder with panic attacks  Active Problems:    Post traumatic stress disorder    Depression      Recommended Treatment:     Planned medication and treatment changes:     All current active medications have been reviewed  Encourage group therapy, milieu therapy and occupational therapy  Behavioral Health checks every 7 minutes    Increase Klonopin to 1 mg twice daily for anxiety symptoms  Continue other medications; she was made aware that we currently do not have Ingrezza on hospital formulary. She is unable to bring in her own medication. She states that she is okay on holding off on this at present.   Continues require inpatient hospitalization at this time; continues to complain of severe anxiety and ongoing panic attacks    Current Facility-Administered Medications   Medication Dose Route Frequency Provider Last Rate    acetaminophen  650 mg Oral Q4H PRN Martha Braden PA-C      acetaminophen  975 mg Oral Q6H PRN Marthajessenia Braden PA-C      aluminum-magnesium hydroxide-simethicone  30 mL Oral Q4H PRN Kuldip Grey MD      amLODIPine  10 mg Oral Daily Kuldip Grey MD      aspirin  81 mg Oral Daily Martha Braden PA-C      atorvastatin  40 mg Oral Daily With Dinner MD Sierra Cavanaugh ON 12/10/2023] cholecalciferol  1,000 Units Oral Daily Martha Braden PA-C      clonazePAM  0.5 mg Oral Daily Rudy A Prayson, DO      clonazePAM  1 mg Oral HS Rudy A Prayson, DO      cyanocobalamin  1,000 mcg Intramuscular Q30 Days Martha Braden PA-C      ergocalciferol  50,000 Units Oral Weekly Martha Braden PA-C      ferrous sulfate  325 mg Oral Every Other Day Martha Braden PA-C      haloperidol lactate  5 mg Intramuscular Q4H PRN Max 4/day Kuldip Grey MD      hydrOXYzine HCL  25 mg Oral Q6H PRN Max 4/day Kuldip Grey MD      influenza vaccine  0.5 mL Intramuscular Prior to discharge Marina Trujillo MD      LORazepam  1 mg Intramuscular Q6H PRN Max 3/day Kuldip Grey MD      melatonin  6 mg Oral HS Rudy A Prayson, DO      nicotine polacrilex  4 mg Oral Q2H PRN Kuldip Grey MD      pantoprazole  40 mg Oral Early Morning Kuldip Grey MD      polyethylene glycol  17 g Oral Daily PRN Martha Braden PA-C      prazosin  1 mg Oral HS Rudy York,       pregabalin  100 mg Oral TID Lulu Essex, MD      QUEtiapine  200 mg Oral HS Alia Raphael,       risperiDONE  0.25 mg Oral Q4H PRN Max 6/day Lulu Essex, MD      risperiDONE  0.5 mg Oral Q4H PRN Max 3/day Lulu Essex, MD      risperiDONE  1 mg Oral Q2H PRN Max 3/day Lulu Essex, MD      sertraline  50 mg Oral Daily Alia Raphael DO      traMADol  50 mg Oral Q6H PRN Erich Eisenmenger, MD       Risks / Benefits of Treatment:    Risks, benefits, and possible side effects of medications explained to patient and patient verbalizes understanding and agreement for treatment. Counseling / Coordination of Care: Total floor / unit time spent today 20 minutes. Greater than 50% of total time was spent with the patient and / or family counseling and / or coordination of care. A description of counseling / coordination of care:  Patient's progress discussed with staff in treatment team meeting. Medications, treatment progress and treatment plan reviewed with patient.     Alex Gunter PA-C 10/23/23

## 2023-10-23 NOTE — PHYSICAL THERAPY NOTE
PHYSICAL THERAPY TREATMENT NOTE  NAME:  Samantha Jasso  DATE: 10/23/23    Length Of Stay: 4  Performed at least 2 patient identifiers during session: Name and ID bracelet    TREATMENT:      10/23/23 1237   PT Last Visit   PT Visit Date 10/23/23   Note Type   Note Type Treatment   Pain Assessment   Pain Assessment Tool 0-10   Pain Score No Pain   Restrictions/Precautions   Weight Bearing Precautions Per Order No   Other Precautions Cognitive; Fall Risk;Pain   General   Chart Reviewed Yes   Family/Caregiver Present No   Cognition   Arousal/Participation Alert; Responsive   Attention Within functional limits   Orientation Level Oriented X4   Memory Decreased recall of precautions;Decreased recall of recent events   Following Commands Follows one step commands without difficulty   Comments pt pleasant and cooperative   Subjective   Subjective "I want to use the walker with the seat"   Transfers   Sit to Stand 5  Supervision   Additional items Increased time required   Stand to Sit 5  Supervision   Additional items Increased time required   Additional Comments RW utilized for functional transfers   Ambulation/Elevation   Gait pattern Improper Weight shift; Forward Flexion;Decreased foot clearance   Gait Assistance 5  Supervision   Additional items Assist x 1   Assistive Device Rolling walker   Distance 150 ftx1 to dining room for lunch   Balance   Static Sitting Good   Dynamic Sitting Fair +   Static Standing Fair   Dynamic Standing Fair -   Ambulatory Fair -   Endurance Deficit   Endurance Deficit Yes   Activity Tolerance   Activity Tolerance Patient limited by fatigue;Patient limited by pain   Nurse Made Aware yes   Assessment   Prognosis Good   Problem List Decreased strength;Decreased endurance; Impaired balance;Decreased mobility;Pain   Assessment Pt seen for PT treatment session this date with interventions consisting of gait training w/ emphasis on improving pt's ability to ambulate level surfaces x 150 ft with distant S provided by therapist with RW and therapeutic activity consisting of training: sit<>stand transfers. Pt agreeable to PT treatment session upon arrival, pt found seated OOB in chair, in no apparent distress and responsive. In comparison to previous session, pt with improvements in distance ambulated . Post session: all needs in reach and RN notified of session findings/recommendations. Continue to recommend home with home health rehabilitation at time of d/c in order to maximize pt's functional independence and safety w/ mobility. Pt continues to be functioning below baseline level. PT will continue to see pt during current hospitalization in order to address the deficits listed above and provide interventions consistent w/ POC in effort to achieve STGs. Goals   STG Expiration Date 11/09/23   Plan   Treatment/Interventions Functional transfer training;LE strengthening/ROM; Therapeutic exercise; Endurance training;Elevations; Patient/family training;Equipment eval/education; Bed mobility;Gait training;Spoke to nursing   Discharge Recommendation   PT Discharge Recommendation Home with home health rehabilitation   AM-PAC Basic Mobility Inpatient   Turning in Flat Bed Without Bedrails 4   Lying on Back to Sitting on Edge of Flat Bed Without Bedrails 4   Moving Bed to Chair 4   Standing Up From Chair Using Arms 4   Walk in Room 3   Climb 3-5 Stairs With Railing 2   Basic Mobility Inpatient Raw Score 21   Basic Mobility Standardized Score 45.55   Highest Level Of Mobility   JH-HLM Goal 6: Walk 10 steps or more   JH-HLM Achieved 7: Walk 25 feet or more   End of Consult   Patient Position at End of Consult All needs within reach  (in dining room for lunch)         The patient's AM-PAC Basic Mobility Inpatient Short Form Raw Score is 21. A Raw score of greater than 16 suggests the patient may benefit from discharge to home.  Please also refer to the recommendation of the Physical Therapist for safe discharge planning.       Berenice Arauz, PTA,PTA

## 2023-10-23 NOTE — PROGRESS NOTES
Progress Note - Samantha Cooper 59 y.o. female MRN: [id number]    Unit/Bed#: [facility] Encounter: [id number]        Subjective:   Patient seen and examined at bedside after reviewing the chart and discussing the case with the caring staff. Patient examined at bedside. Patient reports no acute symptoms. Physical Exam   Vitals: Blood pressure 105/55, pulse 65, temperature 97.5 °F (36.4 °C), temperature source Temporal, resp. rate 18, height 5' 1" (1.549 m), weight 81.7 kg (180 lb 3.2 oz), SpO2 97 %, not currently breastfeeding. ,Body mass index is 34.05 kg/m². Constitutional: Patient in no acute distress. HEENT: PERR, EOMI, MMM. Cardiovascular: Normal rate and regular rhythm. Pulmonary/Chest: Effort normal and breath sounds normal.   Abdomen: Soft, + BS, NT. Assessment/Plan:  Samantha Cooper is a(n) 59 y.o. female with panic disorder. 1.  Cardiac with hx HTN, HLD. Continue amlodipine 10 mg daily, atorvastatin 40 mg daily, ASA 81 mg daily. 2. Tardive dyskinesia. Continue home valbenazine tosylate 40 mg daily. 3. Iron deficiency anemia. Patient is on ferrous sulfate 324 mg every other day. 4. DJD/OA/chronic back pain. Continue Lyrica 100 mg 3 times daily. Tylenol for mild/mod pain. I will increase tramadol q6h prn for severe pain. Robaxin prn.  5. Gait abnormality. PT OT consulted for further evaluation. 6. GERD. Patient is on Protonix 40 mg daily. 7. Vitamin D deficiency. Patient started on vitamin D2 50,000 units weekly for 8 weeks followed by vitamin D3 1000 units daily. 8. Vitamin B12 deficiency. Patient started on vitamin B-12 monthly injections. 9. Weight gain. Dietitian to see patient.

## 2023-10-23 NOTE — PROGRESS NOTES
Progress Note - Behavioral Health     Samantha Cooper 59 y.o. female MRN: [id number]   Unit/Bed#: [facility] 200-01 Encounter: [id number]    Behavior over the last 24 hours: unchanged. Samantha was seen today in follow-up. Per staff, she continues to endorse ongoing moderate anxiety and depression symptoms. Did report to an episode of a panic attack which led to the utilization of PRN atarax 25 mg yesterday at 1748. Did request tramadol 50 mg at 0549 for severe pain. Today she is seen resting in bed. She does continue to have orofacial dyskinetic movements, she is prescribed Babar Carolus however this is not currently on hospital formulary. Unfortunately, she states that she has no one to come bring this medication for her from home. Usually when taking it, she states that this is helpful but currently her involuntary movements have been manageable. She has continued to endorse ongoing anxiety and panic symptoms without any identifiable trigger. We did discuss increase in Klonopin to help with symptom management, however patient was educated to return for any worsening sedative effects. She did not mention any worsening nightmares today. No worsening episodes of tearfulness on exam.  No overt psychosis or paranoia.        Sleep: normal  Appetite: normal  Medication side effects: No   ROS: no complaints, all other systems are negative    Mental Status Evaluation:    Appearance:  age appropriate, casually dressed, dressed appropriately   Behavior:  cooperative   Speech:  dysarthric   Mood:  depressed, anxious   Affect:  mood-congruent   Thought Process:  goal directed   Associations: intact associations   Thought Content:  negative thoughts, ruminating thoughts   Perceptual Disturbances: no auditory hallucinations, no visual hallucinations   Risk Potential: Suicidal ideation - None at present  Homicidal ideation - None at present  Potential for aggression - No   Sensorium:  oriented to person, place, and time/date Memory:  recent and remote memory grossly intact   Consciousness:  alert and awake   Attention/Concentration: attention span and concentration are age appropriate   Insight:  limited   Judgment: limited   Gait/Station: normal gait/station   Motor Activity: no abnormal movements     Vital signs in last 24 hours:    Temp:  [97.4 °F (36.3 °C)-97.6 °F (36.4 °C)] 97.5 °F (36.4 °C)  HR:  [63-71] 65  Resp:  [18] 18  BP: (105-118)/(55-66) 105/55    Laboratory results: I have personally reviewed all pertinent laboratory/tests results    Results from the past 24 hours: No results found for this or any previous visit (from the past 24 hour(s)). Most Recent Labs:   Lab Results   Component Value Date    WBC 3.39 (L) 10/19/2023    RBC 4.08 10/19/2023    HGB 12.9 10/19/2023    HCT 38.6 10/19/2023     10/19/2023    RDW 13.4 10/19/2023    NEUTROABS 1.17 (L) 10/19/2023    TOTANEUTABS 0.83 (L) 05/25/2023    SODIUM 139 10/19/2023    K 3.6 10/19/2023     10/19/2023    CO2 28 10/19/2023    BUN 11 10/19/2023    CREATININE 0.66 10/19/2023    GLUC 86 10/19/2023    CALCIUM 8.7 10/19/2023    AST 24 10/18/2023    ALT 27 10/18/2023    ALKPHOS 102 10/18/2023    TP 6.2 (L) 10/18/2023    ALB 4.3 10/18/2023    TBILI 0.76 10/18/2023    CHOLESTEROL 153 10/19/2023    HDL 63 10/19/2023    TRIG 70 10/19/2023    LDLCALC 76 10/19/2023    [address] 90 10/19/2023    LITHIUM <0.1 (L) 03/23/2023    AMMONIA <10 (L) 06/27/2021    [id number] 0.765 10/18/2023    FREET4 0.80 02/13/2020    PREGSERUM Negative 02/22/2014    HGBA1C 4.5 07/19/2023    EAG 82 07/19/2023       Progress Toward Goals: progressing    Assessment/Plan   Principal Problem:    Generalized anxiety disorder with panic attacks  Active Problems:    Post traumatic stress disorder    Depression      Recommended Treatment:     Planned medication and treatment changes:     All current active medications have been reviewed  Encourage group therapy, milieu therapy and occupational therapy  Behavioral Health checks every 7 minutes    Increase Klonopin to 1 mg twice daily for anxiety symptoms  Continue other medications; she was made aware that we currently do not have Ingrezza on hospital formulary. She is unable to bring in her own medication. She states that she is okay on holding off on this at present.   Continues require inpatient hospitalization at this time; continues to complain of severe anxiety and ongoing panic attacks    Current Facility-Administered Medications   Medication Dose Route Frequency Provider Last Rate    acetaminophen  650 mg Oral Q4H PRN Martha Braden PA-C      acetaminophen  975 mg Oral Q6H PRN Martha Braden PA-C      aluminum-magnesium hydroxide-simethicone  30 mL Oral Q4H PRN Simone Kaye MD      amLODIPine  10 mg Oral Daily Simone Kaye MD      aspirin  81 mg Oral Daily Martha Braden PA-C      atorvastatin  40 mg Oral Daily With Dinner Simone Kaye MD      [START ON 12/10/2023] cholecalciferol  1,000 Units Oral Daily Martah Braden PA-C      clonazePAM  1 mg Oral HS Fred Parekh DO      [START ON 10/24/2023] clonazePAM  1 mg Oral Daily Blank Brock PA-C      cyanocobalamin  1,000 mcg Intramuscular Q30 Days Martha Braden PA-C      ergocalciferol  50,000 Units Oral Weekly Martha Braden PA-C      ferrous sulfate  325 mg Oral Every Other Day Martha Braden PA-C      haloperidol lactate  5 mg Intramuscular Q4H PRN Max 4/day Simone Kaye MD      hydrOXYzine HCL  25 mg Oral Q6H PRN Max 4/day Simone Kaye MD      influenza vaccine  0.5 mL Intramuscular Prior to discharge Liss Palacios MD      LORazepam  1 mg Intramuscular Q6H PRN Max 3/day Simone Kaye MD      melatonin  6 mg Oral HS Rudy York DO      nicotine polacrilex  4 mg Oral Q2H PRN Simone Kaye MD      pantoprazole  40 mg Oral Early Morning Simone Kaye MD      polyethylene glycol  17 g Oral Daily PRN Martha OROURKE JACQUI Braden      prazosin  1 mg Oral HS Rudy York, DO      pregabalin  100 mg Oral TID Tyronne Holstein, MD      QUEtiapine  200 mg Oral HS Fco Coronel DO      risperiDONE  0.25 mg Oral Q4H PRN Max 6/day Tyronne Holstein, MD      risperiDONE  0.5 mg Oral Q4H PRN Max 3/day Tyronne Holstein, MD      risperiDONE  1 mg Oral Q2H PRN Max 3/day Tyronne Holstein, MD      sertraline  50 mg Oral Daily Fco Coronel DO      traMADol  50 mg Oral Q6H PRN Courtney Muñoz MD       Risks / Benefits of Treatment:    Risks, benefits, and possible side effects of medications explained to patient and patient verbalizes understanding and agreement for treatment. Counseling / Coordination of Care: Total floor / unit time spent today 20 minutes. Greater than 50% of total time was spent with the patient and / or family counseling and / or coordination of care. A description of counseling / coordination of care:  Patient's progress discussed with staff in treatment team meeting. Medications, treatment progress and treatment plan reviewed with patient.     Ana Castaneda PA-C 10/23/23

## 2023-10-23 NOTE — PROGRESS NOTES
10/23/23    Team Meeting   Meeting Type Daily Rounds   Team Members Present   Team Members Present Physician;Nurse;   Physician Team Member Dr. Jhonny Sofia MD   Nursing Team Member Katie Negron RN   Care Management Team Member Bharti Crain MS, [facility] [place]   Patient/Family Present   Patient Present No   Patient's Family Present No   Panic attack yesterday. Moderate anxiety and depression, reports nightmares. From home will return when stable, has waiver services. Will follow up with [facility] mental health clinic.

## 2023-10-24 PROCEDURE — 97116 GAIT TRAINING THERAPY: CPT

## 2023-10-24 PROCEDURE — 97530 THERAPEUTIC ACTIVITIES: CPT

## 2023-10-24 PROCEDURE — 99232 SBSQ HOSP IP/OBS MODERATE 35: CPT | Performed by: PSYCHIATRY & NEUROLOGY

## 2023-10-24 RX ADMIN — FERROUS SULFATE TAB 325 MG (65 MG ELEMENTAL FE) 325 MG: 325 (65 FE) TAB at 09:05

## 2023-10-24 RX ADMIN — CLONAZEPAM 1 MG: 1 TABLET ORAL at 09:05

## 2023-10-24 RX ADMIN — Medication 6 MG: at 21:50

## 2023-10-24 RX ADMIN — PREGABALIN 100 MG: 100 CAPSULE ORAL at 17:24

## 2023-10-24 RX ADMIN — PREGABALIN 100 MG: 100 CAPSULE ORAL at 21:50

## 2023-10-24 RX ADMIN — PRAZOSIN HYDROCHLORIDE 1 MG: 1 CAPSULE ORAL at 21:50

## 2023-10-24 RX ADMIN — PREGABALIN 100 MG: 100 CAPSULE ORAL at 09:05

## 2023-10-24 RX ADMIN — ATORVASTATIN CALCIUM 40 MG: 40 TABLET, FILM COATED ORAL at 17:24

## 2023-10-24 RX ADMIN — TRAMADOL HYDROCHLORIDE 50 MG: 50 TABLET, COATED ORAL at 17:43

## 2023-10-24 RX ADMIN — CLONAZEPAM 1 MG: 1 TABLET ORAL at 21:49

## 2023-10-24 RX ADMIN — PANTOPRAZOLE SODIUM 40 MG: 40 TABLET, DELAYED RELEASE ORAL at 05:51

## 2023-10-24 RX ADMIN — QUETIAPINE 200 MG: 50 TABLET, FILM COATED, EXTENDED RELEASE ORAL at 21:50

## 2023-10-24 RX ADMIN — ASPIRIN 81 MG: 81 TABLET, COATED ORAL at 09:04

## 2023-10-24 RX ADMIN — TRAMADOL HYDROCHLORIDE 50 MG: 50 TABLET, COATED ORAL at 09:27

## 2023-10-24 RX ADMIN — SERTRALINE HYDROCHLORIDE 50 MG: 50 TABLET ORAL at 09:05

## 2023-10-24 NOTE — PROGRESS NOTES
Progress Note - Behavioral Health     Samantha Miguel 59 y.o. female MRN: [id number]   Unit/Bed#: [facility] 200-01 Encounter: [id number]    Behavior over the last 24 hours: unchanged. Samantha was seen today in follow-up. Per staff, has been generally pleasant and cooperative with peers and staff. Is seen visible on the milieu, however is withdrawn to self. Continues to complain of ongoing lower back pain and is taking PRN Toradol for relief. No recent panic episodes, nightmares were not reported. Otherwise, has been cooperative with sleep, medications and meals. No acute events reported overnight. Today, Samantha is seen in group room eating her breakfast.  She is dressed in regular attire, cooperative and engaged with interview. She endorses that her mood is still, "depressed and anxious."  She is rating her anxiety an 8/10 today, however is hopeful that recent medication adjustments will help with these her anxiety and fear of panic episodes. Primary psychosocial stressors are surrounding her living situation (living with her children and grandchildren). Objectively does appear calm and less anxious. Seems to be goal oriented in conversation, however coping skills have been limited when dealing with family stressors. Otherwise, she is denying suicidal thoughts, plan or intent. She denies HI/AVH. No overt delusional content. Continues to have orofacial-dyskinetic movements - nursing to reach out of family can bring in [facility].      Sleep: normal  Appetite: normal  Medication side effects: No   ROS: no complaints, all other systems are negative  VS reviewed and stable    Mental Status Evaluation:    Appearance:  age appropriate, casually dressed, dressed appropriately   Behavior:  pleasant, cooperative, calm   Speech:  dysarthric   Mood:  depressed, anxious   Affect:  mood-congruent   Thought Process:  goal directed   Associations: intact associations   Thought Content:  negative thoughts, ruminating thoughts Perceptual Disturbances: no auditory hallucinations, no visual hallucinations   Risk Potential: Suicidal ideation - None at present  Homicidal ideation - None at present  Potential for aggression - No   Sensorium:  oriented to person, place, and time/date   Memory:  recent and remote memory grossly intact   Consciousness:  alert and awake   Attention/Concentration: attention span and concentration are age appropriate   Insight:  limited   Judgment: limited   Gait/Station: uses walker   Motor Activity: abnormal movement noted: dyskinetic face movements present     Vital signs in last 24 hours:    Temp:  [97.3 °F (36.3 °C)-98 °F (36.7 °C)] 97.4 °F (36.3 °C)  HR:  [60-71] 60  Resp:  [16-18] 16  BP: (101-149)/(54-93) 106/66    Laboratory results: I have personally reviewed all pertinent laboratory/tests results    Results from the past 24 hours: No results found for this or any previous visit (from the past 24 hour(s)).   Most Recent Labs:   Lab Results   Component Value Date    WBC 3.39 (L) 10/19/2023    RBC 4.08 10/19/2023    HGB 12.9 10/19/2023    HCT 38.6 10/19/2023     10/19/2023    RDW 13.4 10/19/2023    NEUTROABS 1.17 (L) 10/19/2023    TOTANEUTABS 0.83 (L) 05/25/2023    SODIUM 139 10/19/2023    K 3.6 10/19/2023     10/19/2023    CO2 28 10/19/2023    BUN 11 10/19/2023    CREATININE 0.66 10/19/2023    GLUC 86 10/19/2023    CALCIUM 8.7 10/19/2023    AST 24 10/18/2023    ALT 27 10/18/2023    ALKPHOS 102 10/18/2023    TP 6.2 (L) 10/18/2023    ALB 4.3 10/18/2023    TBILI 0.76 10/18/2023    CHOLESTEROL 153 10/19/2023    HDL 63 10/19/2023    TRIG 70 10/19/2023    LDLCALC 76 10/19/2023    [address] 10/19/2023    LITHIUM <0.1 (L) 03/23/2023    AMMONIA <10 (L) 06/27/2021    [id number] 0.765 10/18/2023    FREET4 0.80 02/13/2020    PREGSERUM Negative 02/22/2014    HGBA1C 4.5 07/19/2023    EAG 82 07/19/2023       Progress Toward Goals: progressing    Assessment/Plan   Principal Problem:    Generalized anxiety disorder with panic attacks  Active Problems:    Post traumatic stress disorder    Depression      Recommended Treatment:     Planned medication and treatment changes: All current active medications have been reviewed  Encourage group therapy, milieu therapy and occupational therapy  Behavioral Health checks every 7 minutes    Continue current medications and therapy -there is option to further titrate Zoloft if necessary to continue to target her depressed mood and anxiety symptoms. Will monitor response to Klonopin increase today.   Nursing to reach out to family can provide Cely Vale to help with her tardive dyskinesia  Continues require inpatient hospitalization at this time due to ongoing severe anxiety and risk of decompensation if discharged    Current Facility-Administered Medications   Medication Dose Route Frequency Provider Last Rate    acetaminophen  650 mg Oral Q4H PRN SHAKILA Wren-C      acetaminophen  975 mg Oral Q6H PRN Martha L SHAKILA Braden-C      aluminum-magnesium hydroxide-simethicone  30 mL Oral Q4H PRN Maribel Padilla MD      amLODIPine  10 mg Oral Daily Maribel Padilla MD      aspirin  81 mg Oral Daily Martha Braden PA-C      atorvastatin  40 mg Oral Daily With Dinner Maribel Padilla MD      [START ON 12/10/2023] cholecalciferol  1,000 Units Oral Daily Martha Braden PA-C      clonazePAM  1 mg Oral HS Rudy York DO      clonazePAM  1 mg Oral Daily Praneeth Brock PA-C      cyanocobalamin  1,000 mcg Intramuscular Q30 Days Martha Braden PA-C      ergocalciferol  50,000 Units Oral Weekly Martha Braden PA-C      ferrous sulfate  325 mg Oral Every Other Day Martha Braden PA-C      haloperidol lactate  5 mg Intramuscular Q4H PRN Max 4/day Maribel Padilla MD      hydrOXYzine HCL  25 mg Oral Q6H PRN Max 4/day Maribel Padilla MD      influenza vaccine  0.5 mL Intramuscular Prior to discharge Kendy Talley MD      LORazepam  1 mg Intramuscular Q6H PRN Max 3/day Veronica Knight MD      melatonin  6 mg Oral HS Nesha Foster DO      nicotine polacrilex  4 mg Oral Q2H PRN Veronica Knight MD      pantoprazole  40 mg Oral Early Morning Veronica Knight MD      polyethylene glycol  17 g Oral Daily PRN Martha Braden PA-C      prazosin  1 mg Oral HS Rudy DEWAYNE Prayson, DO      pregabalin  100 mg Oral TID Veronica Knight MD      QUEtiapine  200 mg Oral HS Nesha Foster, DO      risperiDONE  0.25 mg Oral Q4H PRN Max 6/day Veronica Knight MD      risperiDONE  0.5 mg Oral Q4H PRN Max 3/day Veronica Knight MD      risperiDONE  1 mg Oral Q2H PRN Max 3/day Veronica Knight MD      sertraline  50 mg Oral Daily Nesha Foster DO      traMADol  50 mg Oral Q6H PRN Ivis Song MD       Risks / Benefits of Treatment:    Risks, benefits, and possible side effects of medications explained to patient and patient verbalizes understanding and agreement for treatment. Counseling / Coordination of Care: Total floor / unit time spent today 20 minutes. Greater than 50% of total time was spent with the patient and / or family counseling and / or coordination of care. A description of counseling / coordination of care:  Patient's progress discussed with staff in treatment team meeting. Medications, treatment progress and treatment plan reviewed with patient.     Gaurav Valdes PA-C 10/24/23

## 2023-10-24 NOTE — PROGRESS NOTES
Progress Note - Samantha Yee Serum 59 y.o. female MRN: [id number]    Unit/Bed#: [facility] Encounter: [id number]        Subjective:   Patient seen and examined at bedside after reviewing the chart and discussing the case with the caring staff. Patient examined at bedside. Patient reports no acute symptoms. Physical Exam   Vitals: Blood pressure 106/66, pulse 60, temperature (!) 97.4 °F (36.3 °C), temperature source Temporal, resp. rate 16, height 5' 1" (1.549 m), weight 81.7 kg (180 lb 3.2 oz), SpO2 100 %, not currently breastfeeding. ,Body mass index is 34.05 kg/m². Constitutional: Patient in no acute distress. HEENT: PERR, EOMI, MMM. Cardiovascular: Normal rate and regular rhythm. Pulmonary/Chest: Effort normal and breath sounds normal.   Abdomen: Soft, + BS, NT. Assessment/Plan:  Samantha Jacinto is a(n) 59 y.o. female with panic disorder. 1.  Cardiac with hx HTN, HLD. Continue amlodipine 10 mg daily, atorvastatin 40 mg daily, ASA 81 mg daily. 2. Tardive dyskinesia. Continue home valbenazine tosylate 40 mg daily. 3. Iron deficiency anemia. Patient is on ferrous sulfate 324 mg every other day. 4. DJD/OA/chronic back pain. Continue Lyrica 100 mg 3 times daily. Tylenol for mild/mod pain. I will increase tramadol q6h prn for severe pain. Robaxin prn.  5. Gait abnormality. PT OT consulted for further evaluation. 6. GERD. Patient is on Protonix 40 mg daily. 7. Vitamin D deficiency. Patient started on vitamin D2 50,000 units weekly for 8 weeks followed by vitamin D3 1000 units daily. 8. Vitamin B12 deficiency. Patient started on vitamin B-12 monthly injections. 9. Weight gain. Dietitian to see patient.

## 2023-10-24 NOTE — PLAN OF CARE
Problem: PHYSICAL THERAPY ADULT  Goal: Performs mobility at highest level of function for planned discharge setting. See evaluation for individualized goals. Description: Treatment/Interventions: Functional transfer training, LE strengthening/ROM, Therapeutic exercise, Endurance training, Elevations, Patient/family training, Equipment eval/education, Bed mobility, Gait training, Spoke to nursing, Spoke to case management  Equipment Recommended:  (pt encouraged to use RW at this time)       See flowsheet documentation for full assessment, interventions and recommendations. Outcome: Progressing  Note: Prognosis: Good  Problem List: Decreased strength, Decreased endurance, Impaired balance, Decreased mobility, Pain  Assessment: Pt seen for PT treatment session this date with interventions consisting of gait training w/ emphasis on improving pt's ability to ambulate level surfaces x 150 ftx1 with close S provided by therapist with RW and therapeutic activity consisting of training: bed mobility, supine<>sit transfers, and sit<>stand transfers. Pt agreeable to PT treatment session upon arrival, pt found supine in bed w/ HOB elevated, in no apparent distress and responsive. In comparison to previous session, pt with improvements in amount of supervision required . Post session: all needs in reach and RN notified of session findings/recommendations. Continue to recommend home with home health rehabilitation at time of d/c in order to maximize pt's functional independence and safety w/ mobility. Pt continues to be functioning below baseline level. PT will continue to see pt during current hospitalization in order to address the deficits listed above and provide interventions consistent w/ POC in effort to achieve STGs.   Barriers to Discharge: Inaccessible home environment, Decreased caregiver support     PT Discharge Recommendation: Home with home health rehabilitation    See flowsheet documentation for full assessment.

## 2023-10-24 NOTE — NURSING NOTE
Patient compliant with meds and meals. Patient pleasant and cooperative, denies everything, social and visible. Patient states still has anxiety but states increased clonazepam is helping. Patient attends groups. Q 7 min behavioral and safety checks in place.

## 2023-10-24 NOTE — NURSING NOTE
Pleasant and cooperative with staff during shift. Compliant with medication. Visible on the unit and social with peers. Endorsed mild to moderate anxiety and depression. Denied SI, HI, or AVH. Made c/o  lower back pain requested PRN pain med, no further c/o pain or discomfort observed. Pt's affect flat. Speech pattern normal and relaxed. Eye contact good. Appearance and hygiene unremarkable. Pt resting in her room. 7 minute safety checks continued.

## 2023-10-24 NOTE — TREATMENT TEAM
10/23/23 2225   Pain Assessment   Pain Assessment Tool 0-10   Pain Score 8   Pain Location/Orientation Orientation: Lower; Location: Back   Pain Radiating Towards no   Pain Onset/Description Onset: Ongoing   Effect of Pain on Daily Activities mood   Patient's Stated Pain Goal No pain   Hospital Pain Intervention(s) Medication (See MAR)   Multiple Pain Sites No   POSS Assessment   [place] Opioid-Induced Sedation Scale (POSS) 1     Tramadol 50 mg administered as ordered per pt's request for lower back pain. Will continue to monitor. 7 minute safety checks continued.

## 2023-10-24 NOTE — PLAN OF CARE
Problem: PAIN - ADULT  Goal: Verbalizes/displays adequate comfort level or baseline comfort level  Description: Interventions:  - Encourage patient to monitor pain and request assistance  - Assess pain using appropriate pain scale  - Administer analgesics based on type and severity of pain and evaluate response  - Implement non-pharmacological measures as appropriate and evaluate response  - Consider cultural and social influences on pain and pain management  - Notify physician/advanced practitioner if interventions unsuccessful or patient reports new pain  Outcome: Progressing     Problem: Depression  Goal: Treatment Goal: Demonstrate behavioral control of depressive symptoms, verbalize feelings of improved mood/affect, and adopt new coping skills prior to discharge  Outcome: Progressing  Goal: Verbalize thoughts and feelings  Description: Interventions:  - Assess and re-assess patient's level of risk   - Engage patient in 1:1 interactions, daily, for a minimum of 15 minutes   - Encourage patient to express feelings, fears, frustrations, hopes   Outcome: Progressing  Goal: Refrain from harming self  Description: Interventions:  - Monitor patient closely, per order   - Supervise medication ingestion, monitor effects and side effects   Outcome: Progressing  Goal: Refrain from isolation  Description: Interventions:  - Develop a trusting relationship   - Encourage socialization   Outcome: Progressing  Goal: Refrain from self-neglect  Outcome: Progressing  Goal: Attend and participate in unit activities, including therapeutic, recreational, and educational groups  Description: Interventions:  - Provide therapeutic and educational activities daily, encourage attendance and participation, and document same in the medical record   Outcome: Progressing  Goal: Complete daily ADLs, including personal hygiene independently, as able  Description: Interventions:  - Observe, teach, and assist patient with ADLS  -  Monitor and promote a balance of rest/activity, with adequate nutrition and elimination   Outcome: Progressing     Problem: Anxiety  Goal: Anxiety is at manageable level  Description: Interventions:  - Assess and monitor patient's anxiety level. - Monitor for signs and symptoms (heart palpitations, chest pain, shortness of breath, headaches, nausea, feeling jumpy, restlessness, irritable, apprehensive). - Collaborate with interdisciplinary team and initiate plan and interventions as ordered.   - [place] patient to unit/surroundings  - Explain treatment plan  - Encourage participation in care  - Encourage verbalization of concerns/fears  - Identify coping mechanisms  - Assist in developing anxiety-reducing skills  - Administer/offer alternative therapies  - Limit or eliminate stimulants  Outcome: Progressing

## 2023-10-24 NOTE — PROGRESS NOTES
10/24/23    Team Meeting   Meeting Type Daily Rounds   Team Members Present   Team Members Present Physician;Nurse;   Physician Team Member Dr. Tatiana Maldonado MD; SHAKILA Rodrigez   Nursing Team Member Jessica Shah[address] Management Team Member MS Francois, [facility], [facility]   Patient/Family Present   Patient Present No   Patient's Family Present No   Will return home when stable with family. Will follow up with [facility]. Cooperative, visible, medication adjustment, nursing will follow up with family to bring in pt select medication.

## 2023-10-24 NOTE — OCCUPATIONAL THERAPY NOTE
10/24/23 1102   Note Type   Note Type Cancelled Session   Cancel Reasons Refusal     Attempted OT trreatment today at 1102. Patient in bed and adamanty refuses at this time. Asked OT to return this afternoon. Will continue with POC as able.   Hortensia King

## 2023-10-24 NOTE — PHYSICAL THERAPY NOTE
PHYSICAL THERAPY TREATMENT NOTE  NAME:  Samantha Brown  DATE: 10/24/23    Length Of Stay: 5  Performed at least 2 patient identifiers during session: Name and ID bracelet    TREATMENT:      10/24/23 1247   PT Last Visit   PT Visit Date 10/24/23   Note Type   Note Type Treatment   Pain Assessment   Pain Assessment Tool 0-10   Pain Score 8   Pain Location/Orientation Orientation: Lower; Location: Back   Pain Onset/Description Onset: Ongoing;Frequency: Constant/Continuous   Effect of Pain on Daily Activities mood   Patient's Stated Pain Goal No pain   Hospital Pain Intervention(s) Medication (See MAR); Repositioned; Ambulation/increased activity; Emotional support; Rest   Multiple Pain Sites No   Restrictions/Precautions   Weight Bearing Precautions Per Order No   Other Precautions Cognitive; Fall Risk;Pain   General   Chart Reviewed Yes   Family/Caregiver Present No   Cognition   Arousal/Participation Alert; Responsive   Attention Within functional limits   Orientation Level Oriented X4   Memory Decreased recall of precautions;Decreased recall of recent events   Following Commands Follows one step commands with increased time or repetition   Comments pt agreeable to limited PT session   Bed Mobility   Supine to Sit 7  Independent   Additional Comments pt seated in chair in DR upon conclusion   Transfers   Sit to Stand 6  Modified independent   Additional items Increased time required   Stand to Sit 6  Modified independent   Additional items Increased time required   Stand pivot 6  Modified independent   Additional items Increased time required  (RW)   Additional Comments RW used   Ambulation/Elevation   Gait pattern Improper Weight shift;Decreased foot clearance; Foward flexed   Gait Assistance 5  Supervision   Additional items Assist x 1   Assistive Device Rolling walker   Distance 150 ftx1   Ambulation/Elevation Additional Comments verbal cues for upright posture   Balance   Static Sitting Normal   Dynamic Sitting Good Static Standing Fair +   Dynamic Standing Fair   Ambulatory Fair -   Endurance Deficit   Endurance Deficit Yes   Activity Tolerance   Activity Tolerance Patient limited by fatigue;Patient limited by pain   Nurse Made Aware yes   Assessment   Prognosis Good   Problem List Decreased strength;Decreased endurance; Impaired balance;Decreased mobility;Pain   Assessment Pt seen for PT treatment session this date with interventions consisting of gait training w/ emphasis on improving pt's ability to ambulate level surfaces x 150 ftx1 with close S provided by therapist with RW and therapeutic activity consisting of training: bed mobility, supine<>sit transfers, and sit<>stand transfers. Pt agreeable to PT treatment session upon arrival, pt found supine in bed w/ HOB elevated, in no apparent distress and responsive. In comparison to previous session, pt with improvements in amount of supervision required . Post session: all needs in reach and RN notified of session findings/recommendations. Continue to recommend home with home health rehabilitation at time of d/c in order to maximize pt's functional independence and safety w/ mobility. Pt continues to be functioning below baseline level. PT will continue to see pt during current hospitalization in order to address the deficits listed above and provide interventions consistent w/ POC in effort to achieve STGs. Barriers to Discharge Inaccessible home environment;Decreased caregiver support   Goals   STG Expiration Date 11/09/23   Plan   Treatment/Interventions Functional transfer training;LE strengthening/ROM; Therapeutic exercise; Endurance training;Gait training;Bed mobility;Spoke to nursing   Discharge Recommendation   PT Discharge Recommendation Home with home health rehabilitation   AM-PAC Basic Mobility Inpatient   Turning in Flat Bed Without Bedrails 4   Lying on Back to Sitting on Edge of Flat Bed Without Bedrails 4   Moving Bed to Chair 4   Standing Up From Chair Using Arms 4   Walk in Room 3   Climb 3-5 Stairs With Railing 2   Basic Mobility Inpatient Raw Score 21   Basic Mobility Standardized Score 45.55   Highest Level Of Mobility   JH-HLM Goal 6: Walk 10 steps or more   JH-HLM Achieved 7: Walk 25 feet or more   End of Consult   Patient Position at End of Consult All needs within reach         The patient's AM-PAC Basic Mobility Inpatient Short Form Raw Score is 21. A Raw score of greater than 16 suggests the patient may benefit from discharge to home. Please also refer to the recommendation of the Physical Therapist for safe discharge planning.       Janet Maradiaga, PTA,PTA

## 2023-10-24 NOTE — NURSING NOTE
Follow up Tramadol 50 mg. Pt observed resting in bed. No sign of pain or discomfort. Breathing calm and even. Will continue to monitor. 7 minute safety checks continued.

## 2023-10-25 PROCEDURE — 99232 SBSQ HOSP IP/OBS MODERATE 35: CPT | Performed by: PSYCHIATRY & NEUROLOGY

## 2023-10-25 RX ORDER — LORATADINE 10 MG/1
10 TABLET ORAL DAILY
Status: DISCONTINUED | OUTPATIENT
Start: 2023-10-25 | End: 2023-11-10 | Stop reason: HOSPADM

## 2023-10-25 RX ADMIN — PREGABALIN 100 MG: 100 CAPSULE ORAL at 08:08

## 2023-10-25 RX ADMIN — PREGABALIN 100 MG: 100 CAPSULE ORAL at 21:37

## 2023-10-25 RX ADMIN — ATORVASTATIN CALCIUM 40 MG: 40 TABLET, FILM COATED ORAL at 17:29

## 2023-10-25 RX ADMIN — PANTOPRAZOLE SODIUM 40 MG: 40 TABLET, DELAYED RELEASE ORAL at 06:23

## 2023-10-25 RX ADMIN — PRAZOSIN HYDROCHLORIDE 1 MG: 1 CAPSULE ORAL at 21:37

## 2023-10-25 RX ADMIN — PREGABALIN 100 MG: 100 CAPSULE ORAL at 17:30

## 2023-10-25 RX ADMIN — TRAMADOL HYDROCHLORIDE 50 MG: 50 TABLET, COATED ORAL at 00:21

## 2023-10-25 RX ADMIN — QUETIAPINE 200 MG: 50 TABLET, FILM COATED, EXTENDED RELEASE ORAL at 21:37

## 2023-10-25 RX ADMIN — TRAMADOL HYDROCHLORIDE 50 MG: 50 TABLET, COATED ORAL at 14:06

## 2023-10-25 RX ADMIN — SERTRALINE HYDROCHLORIDE 50 MG: 50 TABLET ORAL at 08:08

## 2023-10-25 RX ADMIN — LORATADINE 10 MG: 10 TABLET ORAL at 12:26

## 2023-10-25 RX ADMIN — ASPIRIN 81 MG: 81 TABLET, COATED ORAL at 08:09

## 2023-10-25 RX ADMIN — CLONAZEPAM 1 MG: 1 TABLET ORAL at 08:08

## 2023-10-25 RX ADMIN — Medication 6 MG: at 21:37

## 2023-10-25 RX ADMIN — CLONAZEPAM 1 MG: 1 TABLET ORAL at 21:37

## 2023-10-25 NOTE — NURSING NOTE
Patient visible in milieu, pleasant and cooperative in interaction. Social with staff and peers. Patient endorses high anxiety/depression due to thinking about life and the changes that will need to be made, support provided. Denies SI/HI, hallucinations. Remains medication compliant and on continuous rounding for safety and behaviors.

## 2023-10-25 NOTE — NURSING NOTE
Patient requested and received PRN Ultram for c/o 8/10 left leg and lower back pain. Administered as per order, will monitor for medication effectiveness.

## 2023-10-25 NOTE — NURSING NOTE
On reassessment of PRN Ultram, patient was observed to be sleeping in bed. No further c/o pain voiced by patient. PRN seemingly effective.

## 2023-10-25 NOTE — PROGRESS NOTES
10/25/23 5677   Team Meeting   Meeting Type Daily Rounds   Team Members Present   Team Members Present Physician;Nurse;;Occupational Therapist   Physician Team Member Dr. Farhat Greer MD; JANIA Landa   Nursing Team Member Rachel Umanzor RN   Care Management Team Member MS Francois, [facility]   OT Team Member Cielo Palmer RN   Patient/Family Present   Patient Present No   Patient's Family Present No   No d/c date at this time. High anxiety and depression, bad dreams, medication adjustment. Reviewed stressors. Will need to schedule meeting with family.

## 2023-10-25 NOTE — PLAN OF CARE
Problem: Ineffective Coping  Goal: Participates in unit activities  Description: Interventions:  - Provide therapeutic environment   - Provide required programming   - Redirect inappropriate behaviors   Outcome: Not Progressing   Patient has not attended any RT Groups this shift. Spends time in isolated activity such as reading. Pleasant on approach. Minimally interactive.

## 2023-10-25 NOTE — PROGRESS NOTES
Progress Note - Behavioral Health     Samantha QUINONEZ Bowie 59 y.o. female MRN: [id number]   Unit/Bed#: [facility] 200-01 Encounter: [id number]    Behavior over the last 24 hours: unchanged. Samantha was seen today in follow-up. Per staff, has been visible, mostly isolative to self on unit. No significant events reported overnight. She was complaining of some nightmares as well as moderate depression and anxiety symptoms. Did except as needed tramadol yesterday for pain management. On presentation, Samantha is seen in group room reading a book. She states that she continues to endorse moderate anxiety due to depressive symptoms, even endorsed having a panic attack last evening. States that she is very overwhelmed by her ongoing stressors with her family. States that she would prefer to go to [facility] or shelter instead of returning to previous residence. It is still very unclear why she does not want to return, however she relates that she often feels like a burden and that she perceives herself as very "difficult" to take care of. In addition, moving back and forth has been difficult for her in terms of ongoing stability. She continues to be negative and thought and ruminative about this. Otherwise, does report some improvement with increasing Klonopin. She is in agreement to increase her Zoloft today to help with ongoing depression and anxiety symptoms. She denies SI/HI/AVH.     Sleep: normal  Appetite: normal  Medication side effects: No   ROS: no complaints, all other systems are negative  VS reviewed and stable    Mental Status Evaluation:    Appearance:  age appropriate, casually dressed, dressed appropriately   Behavior:  pleasant, cooperative, calm   Speech:  dysarthric   Mood:  depressed, anxious, " panicky"   Affect:  mood-congruent   Thought Process:  goal directed   Associations: intact associations   Thought Content:  negative thoughts, ruminating thoughts   Perceptual Disturbances: no auditory hallucinations, no visual hallucinations   Risk Potential: Suicidal ideation - None at present  Homicidal ideation - None at present  Potential for aggression - No   Sensorium:  oriented to person, place, and time/date   Memory:  recent and remote memory grossly intact   Consciousness:  alert and awake   Attention/Concentration: attention span and concentration are age appropriate   Insight:  limited   Judgment: limited   Gait/Station: uses walker   Motor Activity: abnormal movement noted: dyskinetic face movements present     Vital signs in last 24 hours:    Temp:  [96.6 °F (35.9 °C)-97.2 °F (36.2 °C)] 97 °F (36.1 °C)  HR:  [66-71] 67  Resp:  [16-18] 18  BP: (109-110)/(65-69) 109/65    Laboratory results: I have personally reviewed all pertinent laboratory/tests results    Results from the past 24 hours: No results found for this or any previous visit (from the past 24 hour(s)).   Most Recent Labs:   Lab Results   Component Value Date    WBC 3.39 (L) 10/19/2023    RBC 4.08 10/19/2023    HGB 12.9 10/19/2023    HCT 38.6 10/19/2023     10/19/2023    RDW 13.4 10/19/2023    NEUTROABS 1.17 (L) 10/19/2023    TOTANEUTABS 0.83 (L) 05/25/2023    SODIUM 139 10/19/2023    K 3.6 10/19/2023     10/19/2023    CO2 28 10/19/2023    BUN 11 10/19/2023    CREATININE 0.66 10/19/2023    GLUC 86 10/19/2023    CALCIUM 8.7 10/19/2023    AST 24 10/18/2023    ALT 27 10/18/2023    ALKPHOS 102 10/18/2023    TP 6.2 (L) 10/18/2023    ALB 4.3 10/18/2023    TBILI 0.76 10/18/2023    CHOLESTEROL 153 10/19/2023    HDL 63 10/19/2023    TRIG 70 10/19/2023    LDLCALC 76 10/19/2023    [address] 10/19/2023    LITHIUM <0.1 (L) 03/23/2023    AMMONIA <10 (L) 06/27/2021    ULX3XGEXPAVD 0.765 10/18/2023    FREET4 0.80 02/13/2020    PREGSERUM Negative 02/22/2014    HGBA1C 4.5 07/19/2023    EAG 82 07/19/2023       Progress Toward Goals: progressing    Assessment/Plan   Principal Problem:    Generalized anxiety disorder with panic attacks  Active Problems:    Post traumatic stress disorder    Depression      Recommended Treatment:     Planned medication and treatment changes:     All current active medications have been reviewed  Encourage group therapy, milieu therapy and occupational therapy  Behavioral Health checks every 7 minutes    We will increase Zoloft to 75 mg daily to help target her depression and anxiety symptoms  Nursing to reach out to family regarding patient's [place] Law to require inpatient hospitalization at this time due to ongoing anxiety and risk of decompensation if discharged    Current Facility-Administered Medications   Medication Dose Route Frequency Provider Last Rate    acetaminophen  650 mg Oral Q4H PRN Martha Braden PA-C      acetaminophen  975 mg Oral Q6H PRN MarthaSHAKILA Wilkins-C      aluminum-magnesium hydroxide-simethicone  30 mL Oral Q4H PRN Jocelyne Thibodeaux MD      amLODIPine  10 mg Oral Daily Jocelyne Thibodeaux MD      aspirin  81 mg Oral Daily Martha Braden PA-C      atorvastatin  40 mg Oral Daily With Dinner Jocelyne Thibodeaux MD      [START ON 12/10/2023] cholecalciferol  1,000 Units Oral Daily Martha Braden PA-C      clonazePAM  1 mg Oral HS Rudy York DO      clonazePAM  1 mg Oral Daily Adrienne Brock PA-C      cyanocobalamin  1,000 mcg Intramuscular Q30 Days Martha Braden PA-C      ergocalciferol  50,000 Units Oral Weekly Martha Braden PA-C      ferrous sulfate  325 mg Oral Every Other Day Martha Braden PA-C      haloperidol lactate  5 mg Intramuscular Q4H PRN Max 4/day Jocelyne Thibodeaux MD      hydrOXYzine HCL  25 mg Oral Q6H PRN Max 4/day Jocelyne Thibodeaux MD      influenza vaccine  0.5 mL Intramuscular Prior to discharge Sunday MD Saud      loratadine  10 mg Oral Daily Martha Braden PA-C      LORazepam  1 mg Intramuscular Q6H PRN Max 3/day Jocelyne Thibodeaux MD      melatonin  6 mg Oral HS Rudy York DO      nicotine polacrilex  4 mg Oral Q2H PRN Christopher Kaminski MD      pantoprazole  40 mg Oral Early Morning Christopher Kaminski MD      polyethylene glycol  17 g Oral Daily PRN Martha Braden PA-C      prazosin  1 mg Oral HS Rudy Traceyon, DO      pregabalin  100 mg Oral TID Christopher Kaminski MD      QUEtiapine  200 mg Oral HS Malina Grant,       risperiDONE  0.25 mg Oral Q4H PRN Max 6/day Christopher Kaminski MD      risperiDONE  0.5 mg Oral Q4H PRN Max 3/day Christopher Kaminski MD      risperiDONE  1 mg Oral Q2H PRN Max 3/day Christopher Kaminski MD      sertraline  50 mg Oral Daily Malina Grant, DO      traMADol  50 mg Oral Q6H PRN Shelia Nguyễn MD       Risks / Benefits of Treatment:    Risks, benefits, and possible side effects of medications explained to patient and patient verbalizes understanding and agreement for treatment. Counseling / Coordination of Care: Total floor / unit time spent today 20 minutes. Greater than 50% of total time was spent with the patient and / or family counseling and / or coordination of care. A description of counseling / coordination of care:  Patient's progress discussed with staff in treatment team meeting. Medications, treatment progress and treatment plan reviewed with patient.     Bunny Kamara PA-C 10/25/23

## 2023-10-25 NOTE — PROGRESS NOTES
Progress Note - Melody D Doroteo Favre 59 y.o. female MRN: [id number]    Unit/Bed#: [facility] Encounter: [id number]        Subjective:   Patient seen and examined at bedside after reviewing the chart and discussing the case with the caring staff. Patient examined at bedside. Patient complaining of allergy symptoms including itchy eyes and sneezing. She is requesting allergy medication. Patient also stated she was upset about her recent weight gain over past few months. Physical Exam   Vitals: Blood pressure 109/65, pulse 67, temperature (!) 97 °F (36.1 °C), temperature source Temporal, resp. rate 18, height 5' 1" (1.549 m), weight 81.7 kg (180 lb 3.2 oz), SpO2 94 %, not currently breastfeeding. ,Body mass index is 34.05 kg/m². Constitutional: Patient in no acute distress. HEENT: PERR, EOMI, MMM. Cardiovascular: Normal rate and regular rhythm. Pulmonary/Chest: Effort normal and breath sounds normal.   Abdomen: Soft, + BS, NT. Assessment/Plan:  Melody D Doroteo Favre is a(n) 59 y.o. female with panic disorder. 1. Cardiac with hx HTN, HLD. Continue amlodipine 10 mg daily, atorvastatin 40 mg daily, ASA 81 mg daily. 2. Tardive dyskinesia. Continue home valbenazine tosylate 40 mg daily. 3. Iron deficiency anemia. Patient is on ferrous sulfate 324 mg every other day. 4. DJD/OA/chronic back pain. Continue Lyrica 100 mg 3 times daily. Tylenol for mild/mod pain. Tramadol q6h prn for severe pain. Robaxin prn.  5. Gait abnormality. PT OT consulted for further evaluation. 6. GERD. Patient is on Protonix 40 mg daily. 7. Vitamin D deficiency. Patient started on vitamin D2 50,000 units weekly for 8 weeks followed by vitamin D3 1000 units daily. 8. Vitamin B12 deficiency. Patient started on vitamin B-12 monthly injections. 9. Weight gain. Dietitian to see patient. 10. Allergic rhinitis. Start Claritin 10 mg daily.      The patient was discussed with Dr. David Walters and he is in agreement with the above note.

## 2023-10-25 NOTE — NURSING NOTE
Patient was observed to be visible in the community this evening; spending time reading and watching television with peers in the small dining area. She is calm and pleasant during staff interactions. Declined evening snack stating she was not hungry, however requested and received a cup of ginger ale. No complaints voiced. Endorses ongoing anxiety, denies depression, denies SI, HI and hallucinations. Samantha was medication compliant at  HS. Utilizes a walker for mobility. Continuous safety rounding in progress.

## 2023-10-26 PROCEDURE — 99232 SBSQ HOSP IP/OBS MODERATE 35: CPT | Performed by: PSYCHIATRY & NEUROLOGY

## 2023-10-26 PROCEDURE — 97116 GAIT TRAINING THERAPY: CPT

## 2023-10-26 RX ORDER — SERTRALINE HYDROCHLORIDE 25 MG/1
25 TABLET, FILM COATED ORAL ONCE
Status: COMPLETED | OUTPATIENT
Start: 2023-10-26 | End: 2023-10-26

## 2023-10-26 RX ADMIN — PREGABALIN 100 MG: 100 CAPSULE ORAL at 08:43

## 2023-10-26 RX ADMIN — FERROUS SULFATE TAB 325 MG (65 MG ELEMENTAL FE) 325 MG: 325 (65 FE) TAB at 08:43

## 2023-10-26 RX ADMIN — SERTRALINE 25 MG: 25 TABLET, FILM COATED ORAL at 15:54

## 2023-10-26 RX ADMIN — CLONAZEPAM 1 MG: 1 TABLET ORAL at 21:41

## 2023-10-26 RX ADMIN — Medication 6 MG: at 21:41

## 2023-10-26 RX ADMIN — AMLODIPINE BESYLATE 10 MG: 10 TABLET ORAL at 08:43

## 2023-10-26 RX ADMIN — ATORVASTATIN CALCIUM 40 MG: 40 TABLET, FILM COATED ORAL at 15:54

## 2023-10-26 RX ADMIN — PREGABALIN 100 MG: 100 CAPSULE ORAL at 21:41

## 2023-10-26 RX ADMIN — QUETIAPINE 200 MG: 50 TABLET, FILM COATED, EXTENDED RELEASE ORAL at 21:40

## 2023-10-26 RX ADMIN — PRAZOSIN HYDROCHLORIDE 1 MG: 1 CAPSULE ORAL at 21:40

## 2023-10-26 RX ADMIN — SERTRALINE HYDROCHLORIDE 50 MG: 50 TABLET ORAL at 08:43

## 2023-10-26 RX ADMIN — TRAMADOL HYDROCHLORIDE 50 MG: 50 TABLET, COATED ORAL at 17:14

## 2023-10-26 RX ADMIN — ASPIRIN 81 MG: 81 TABLET, COATED ORAL at 08:43

## 2023-10-26 RX ADMIN — LORATADINE 10 MG: 10 TABLET ORAL at 08:43

## 2023-10-26 RX ADMIN — CLONAZEPAM 1 MG: 1 TABLET ORAL at 08:43

## 2023-10-26 RX ADMIN — PREGABALIN 100 MG: 100 CAPSULE ORAL at 15:54

## 2023-10-26 RX ADMIN — PANTOPRAZOLE SODIUM 40 MG: 40 TABLET, DELAYED RELEASE ORAL at 06:24

## 2023-10-26 NOTE — PLAN OF CARE
Problem: PAIN - ADULT  Goal: Verbalizes/displays adequate comfort level or baseline comfort level  Description: Interventions:  - Encourage patient to monitor pain and request assistance  - Assess pain using appropriate pain scale  - Administer analgesics based on type and severity of pain and evaluate response  - Implement non-pharmacological measures as appropriate and evaluate response  - Consider cultural and social influences on pain and pain management  - Notify physician/advanced practitioner if interventions unsuccessful or patient reports new pain  Outcome: Progressing     Problem: SAFETY ADULT  Goal: Patient will remain free of falls  Description: INTERVENTIONS:  - Educate patient/family on patient safety including physical limitations  - Instruct patient to call for assistance with activity   - Consult OT/PT to assist with strengthening/mobility   - Keep Call bell within reach  - Keep bed low and locked with side rails adjusted as appropriate  - Keep care items and personal belongings within reach  - Initiate and maintain comfort rounds  - Make Fall Risk Sign visible to staff  - Apply yellow socks and bracelet for high fall risk patients  - Consider moving patient to room near nurses station  Outcome: Progressing     Problem: Ineffective Coping  Goal: Understands least restrictive measures  Description: Interventions:  - Utilize least restrictive behavior  Outcome: Progressing  Goal: Free from restraint events  Description: - Utilize least restrictive measures   - Provide behavioral interventions   - Redirect inappropriate behaviors   Outcome: Progressing     Problem: Depression  Goal: Verbalize thoughts and feelings  Description: Interventions:  - Assess and re-assess patient's level of risk   - Engage patient in 1:1 interactions, daily, for a minimum of 15 minutes   - Encourage patient to express feelings, fears, frustrations, hopes   Outcome: Progressing  Goal: Refrain from harming self  Description: Interventions:  - Monitor patient closely, per order   - Supervise medication ingestion, monitor effects and side effects   Outcome: Progressing  Goal: Refrain from self-neglect  Outcome: Progressing  Goal: Complete daily ADLs, including personal hygiene independently, as able  Description: Interventions:  - Observe, teach, and assist patient with ADLS  -  Monitor and promote a balance of rest/activity, with adequate nutrition and elimination   Outcome: Progressing     Problem: Anxiety  Goal: Anxiety is at manageable level  Description: Interventions:  - Assess and monitor patient's anxiety level. - Monitor for signs and symptoms (heart palpitations, chest pain, shortness of breath, headaches, nausea, feeling jumpy, restlessness, irritable, apprehensive). - Collaborate with interdisciplinary team and initiate plan and interventions as ordered. - [place] patient to unit/surroundings  - Explain treatment plan  - Encourage participation in care  - Encourage verbalization of concerns/fears  - Identify coping mechanisms  - Assist in developing anxiety-reducing skills  - Administer/offer alternative therapies  - Limit or eliminate stimulants  Outcome: Progressing     Problem: Nutrition/Hydration-ADULT  Goal: Nutrient/Hydration intake appropriate for improving, restoring or maintaining nutritional needs  Description: Monitor and assess patient's nutrition/hydration status for malnutrition. Collaborate with interdisciplinary team and initiate plan and interventions as ordered. Monitor patient's weight and dietary intake as ordered or per policy. Utilize nutrition screening tool and intervene as necessary. Determine patient's food preferences and provide high-protein, high-caloric foods as appropriate.      INTERVENTIONS:  - Monitor oral intake, urinary output, labs, and treatment plans  - Assess nutrition and hydration status and recommend course of action  - Evaluate amount of meals eaten  - Assist patient with eating if necessary   - Allow adequate time for meals  - Recommend/ encourage appropriate diets, oral nutritional supplements, and vitamin/mineral supplements  - Order, calculate, and assess calorie counts as needed  - Recommend, monitor, and adjust tube feedings and TPN/PPN based on assessed needs  - Assess need for intravenous fluids  - Provide specific nutrition/hydration education as appropriate  - Include patient/family/caregiver in decisions related to nutrition  Outcome: Progressing     Problem: Prexisting or High Potential for Compromised Skin Integrity  Goal: Skin integrity is maintained or improved  Description: INTERVENTIONS:  - Identify patients at risk for skin breakdown  - Assess and monitor skin integrity  - Assess and monitor nutrition and hydration status  - Monitor labs   - Assess for incontinence   - Turn and reposition patient  - Assist with mobility/ambulation  - Relieve pressure over bony prominences  - Avoid friction and shearing  - Provide appropriate hygiene as needed including keeping skin clean and dry  - Evaluate need for skin moisturizer/barrier cream  - Collaborate with interdisciplinary team   - Patient/family teaching  - Consider wound care consult   Outcome: Progressing

## 2023-10-26 NOTE — PROGRESS NOTES
Progress Note - Samantha Elliott 59 y.o. female MRN: [id number]    Unit/Bed#: [facility] Encounter: [id number]        Subjective:   Patient seen and examined at bedside after reviewing the chart and discussing the case with the caring staff. Patient examined at bedside. Patient has no complaints today. She reports her allergies feel improved since starting Claritin yesterday. Physical Exam   Vitals: Blood pressure 120/68, pulse 58, temperature 97.5 °F (36.4 °C), temperature source Temporal, resp. rate 16, height 5' 1" (1.549 m), weight 81.7 kg (180 lb 3.2 oz), SpO2 99 %, not currently breastfeeding. ,Body mass index is 34.05 kg/m². Constitutional: Patient in no acute distress. HEENT: PERR, EOMI, MMM. Cardiovascular: Normal rate and regular rhythm. Pulmonary/Chest: Effort normal and breath sounds normal.   Abdomen: Soft, + BS, NT. Assessment/Plan:  Samantha Elliott is a(n) 59 y.o. female with panic disorder. 1. Cardiac with hx HTN, HLD. Continue amlodipine 10 mg daily, atorvastatin 40 mg daily, ASA 81 mg daily. 2. Tardive dyskinesia. Continue home valbenazine tosylate 40 mg daily. 3. Iron deficiency anemia. Patient is on ferrous sulfate 324 mg every other day. 4. DJD/OA/chronic back pain. Continue Lyrica 100 mg 3 times daily. Tylenol for mild/mod pain. Tramadol q6h prn for severe pain. Robaxin prn.  5. Gait abnormality. PT OT consulted for further evaluation. 6. GERD. Patient is on Protonix 40 mg daily. 7. Vitamin D deficiency. Patient started on vitamin D2 50,000 units weekly for 8 weeks followed by vitamin D3 1000 units daily. 8. Vitamin B12 deficiency. Patient started on vitamin B-12 monthly injections. 9. Weight gain. Dietitian to see patient. 10. Allergic rhinitis. Started on Claritin 10 mg daily 10/25/23. The patient was discussed with Dr. Poornima Pablo and he is in agreement with the above note.

## 2023-10-26 NOTE — PLAN OF CARE
Problem: PHYSICAL THERAPY ADULT  Goal: Performs mobility at highest level of function for planned discharge setting. See evaluation for individualized goals. Description: Treatment/Interventions: Functional transfer training, LE strengthening/ROM, Therapeutic exercise, Endurance training, Elevations, Patient/family training, Equipment eval/education, Bed mobility, Gait training, Spoke to nursing, Spoke to case management  Equipment Recommended:  (pt encouraged to use RW at this time)       See flowsheet documentation for full assessment, interventions and recommendations. Outcome: Progressing  Note: Prognosis: Good  Problem List: Decreased strength, Decreased endurance, Impaired balance, Decreased mobility, Decreased safety awareness  Assessment: Pt seen for PT treatment session this date with interventions consisting of gait training w/ emphasis on improving pt's ability to ambulate level surfaces x 150 ftx1 with modified independent provided by therapist with RW and therapeutic activity consisting of training: sit<>stand transfers. Pt agreeable to PT treatment session upon arrival, pt found seated at EOB, in no apparent distress and responsive. In comparison to previous session, pt with improvements in amount of supervision required . Post session: all needs in reach and RN notified of session findings/recommendations. Continue to recommend home with home health rehabilitation at time of d/c in order to maximize pt's functional independence and safety w/ mobility. Pt continues to be functioning below baseline level. PT will continue to see pt during current hospitalization in order to address the deficits listed above and provide interventions consistent w/ POC in effort to achieve STGs. Barriers to Discharge: Inaccessible home environment, Decreased caregiver support     PT Discharge Recommendation: Home with home health rehabilitation    See flowsheet documentation for full assessment.

## 2023-10-26 NOTE — SOCIAL WORK
CM met with PT for PT check in. PT initially started with that she is not going to her daughters home. CM inquired why, PT stated " she is dead to me" . CM processed with PT PT thoughts and feelings. After conversation, PT is agreeable to return to daughters home and to [facility] speaking with her. CM indicated that she would reach out to her today. PT denies si/hi/ah/vh reported high anxiety and depression. Much reassurance provided.

## 2023-10-26 NOTE — NURSING NOTE
Pt up ad dudley with walker. Pt c/o mild depression & mild anxiety. Pt denies any hallucinations, suicidal or homicidal ideations. Q 7 min checks maintained to monitor pt's behavior & safety. Pt is pleasant & socializes with other patients. Pt is cooperative & compliant with medications.

## 2023-10-26 NOTE — SOCIAL WORK
CM placed follow up call to PT daughter Rayray Cook [phone] , left message requesting return call.

## 2023-10-26 NOTE — NUTRITION
10/26/23 1504   Biochemical Data,Medical Tests, and Procedures   Biochemical Data/Medical Tests/Procedures Lab values reviewed; Meds reviewed   Labs (Comment) 10/19 BMP WNL, lipids WNL, Vit D:18.4, WBC:3.39   Meds (Comment) norvasc, ASA, lipitor, Vit D, klonopin, Vit B12, FeSO4, haldol, ativan, melatonin, protonix, minipress, lyrica, zoloft, risperdal, ultram   Nutrition-Focused Physical Exam   Nutrition-Focused Physical Exam Findings RN skin assessment reviewed; No skin issues documented   Nutrition-Focused Physical Exam Findings Trace BLE edema. LBM 10/25. Medical-Related Concerns anxiety, arthritis, depression, bipolar disorder, GERD, Samish, HLD, HTN, left sided weakness, obesity, PTSD, CVA, SBO, tardive dyskinesia   Adequacy of Intake   Nutrition Modality PO   Feeding Route   PO Independent   Current PO Intake   Current Diet Order Regular diet thin liquids   Current Meal Intake %   Estimated calorie intake compared to estimated need Nutrient needs met. PES Statement   Problem Clinical   Weight (3) Unintended weight gain NC-3.4   Related to Energy intake>energy output over time   As evidenced by: Weight gain;Per patient/family interview   Recommendations/Interventions   Malnutrition/BMI Present No  (does not meet criteria)   Summary Consult: nutrition assessment. Length of stay. Regular diet thin liquids. Meal completions %. Reports good appetite. Reports she eats a healthy diet and avoids use of salt. She reports eating about 3 meals per day. B: cereal, banana, orange juice; L: 1/2 sandwich; D: vegetables & fish. Reports consuming small portions. 10/19/#; 9/16/#; 3/23/#, 21#(13%) gain; 8/31/#. Patient reports weight gain due to her medications. Trace BLE edema. LBM 10/25. Skin intact. Patient states she is not very active and uses a walker to ambulate. She expressed frustration about her weight gain.  Writer provided verbal diet education however patient responded "I've already done that!" to all dietary suggestions. Discussed changing diet to Cardiac to help promote healthier food choices however patient was not agreeable. Interventions/Recommendations Continue current diet order   Education Assessment   Education Education initiated/ completed   Education Notes Provided verbal weight loss diet education. Discussed foods to avoid or limit during admission to prevent further weight gain. Patient Nutrition Goals   Goal Avoid weight gain; Improve to healthful diet   Goal Status Initiated   Timeframe to complete goal by next f/u   Nutrition Complexity Risk   Nutrition complexity level Low risk   Follow up date 11/09/23

## 2023-10-26 NOTE — PHYSICAL THERAPY NOTE
PHYSICAL THERAPY TREATMENT NOTE  NAME:  Samantha Gregory  DATE: 10/26/23    Length Of Stay: 7  Performed at least 2 patient identifiers during session: Name and ID bracelet    TREATMENT:      10/26/23 1410   PT Last Visit   PT Visit Date 10/26/23   Note Type   Note Type Treatment   Pain Assessment   Pain Assessment Tool 0-10   Pain Score No Pain   Restrictions/Precautions   Weight Bearing Precautions Per Order No   Other Precautions Cognitive; Fall Risk;Pain   General   Chart Reviewed Yes   Family/Caregiver Present No   Cognition   Arousal/Participation Alert; Responsive   Attention Within functional limits   Orientation Level Oriented X4   Memory Decreased recall of precautions;Decreased recall of recent events   Following Commands Follows one step commands without difficulty   Comments pt pleasant and cooperative   Subjective   Subjective "I going to make a phone call"   Transfers   Sit to Stand 6  Modified independent   Additional items Increased time required   Stand to Sit 6  Modified independent   Additional items Increased time required   Additional Comments RW utilized   Ambulation/Elevation   Gait pattern Improper Weight shift;Decreased foot clearance; Forward Flexion   Gait Assistance 6  Modified independent   Assistive Device Rolling walker   Distance 150 ftx1   Balance   Static Sitting Normal   Dynamic Sitting Good   Static Standing Fair +   Dynamic Standing Fair   Ambulatory Fair   Endurance Deficit   Endurance Deficit Yes   Activity Tolerance   Activity Tolerance Patient limited by fatigue;Patient limited by pain   Nurse Made Aware yes   Assessment   Prognosis Good   Problem List Decreased strength;Decreased endurance; Impaired balance;Decreased mobility; Decreased safety awareness   Assessment Pt seen for PT treatment session this date with interventions consisting of gait training w/ emphasis on improving pt's ability to ambulate level surfaces x 150 ftx1 with modified independent provided by therapist with RW and therapeutic activity consisting of training: sit<>stand transfers. Pt agreeable to PT treatment session upon arrival, pt found seated at EOB, in no apparent distress and responsive. In comparison to previous session, pt with improvements in amount of supervision required . Post session: all needs in reach and RN notified of session findings/recommendations. Continue to recommend home with home health rehabilitation at time of d/c in order to maximize pt's functional independence and safety w/ mobility. Pt continues to be functioning below baseline level. PT will continue to see pt during current hospitalization in order to address the deficits listed above and provide interventions consistent w/ POC in effort to achieve STGs. Goals   STG Expiration Date 11/09/23   Plan   Treatment/Interventions Functional transfer training;LE strengthening/ROM; Therapeutic exercise; Endurance training;Elevations; Bed mobility;Gait training;Spoke to nursing   Discharge Recommendation   PT Discharge Recommendation Home with home health rehabilitation   AM-PAC Basic Mobility Inpatient   Turning in Flat Bed Without Bedrails 4   Lying on Back to Sitting on Edge of Flat Bed Without Bedrails 4   Moving Bed to Chair 4   Standing Up From Chair Using Arms 4   Walk in Room 4   Climb 3-5 Stairs With Railing 2   Basic Mobility Inpatient Raw Score 22   Basic Mobility Standardized Score 47.4   Highest Level Of Mobility   JH-HLM Goal 7: Walk 25 feet or more   JH-HLM Achieved 7: Walk 25 feet or more   End of Consult   Patient Position at End of Consult All needs within reach         The patient's AM-[facility] Basic Mobility Inpatient Short Form Raw Score is 22. A Raw score of greater than 16 suggests the patient may benefit from discharge to home. Please also refer to the recommendation of the Physical Therapist for safe discharge planning.       Ginger Poag, PTA,PTA

## 2023-10-26 NOTE — NURSING NOTE
Patient was observed to be napping during the early evening hours; she was easily arousible for assessment. She is calm and cooperative during staff interactions. She endorses moderate anxiety and depression d/t "spending time thinking about things", denies SI, HI and hallucinations. Denies any pain. She showered this evening. Spent time in the small dining area reading a book. Samantha was medication compliant at HS. Declined snack stating she was not hungry and is "watching her weight."  No acute behaviors observed. Ambulates with use of walker. Continuous safety rounding in progress.

## 2023-10-26 NOTE — PROGRESS NOTES
Progress Note - Behavioral Health     Samantha Núñez Peak 59 y.o. female MRN: [id number]   Unit/Bed#: [facility] 200-01 Encounter: [id number]    Behavior over the last 24 hours: unchanged. Samantha was seen today in follow-up. Per staff, has been visible on the unit but remains isolative to self. Endorses mild- moderate depressive symptoms. Did utilize PRN pain medication Tramadol 50 mg for pain at 0021 and 1406 yesterday. Did not report any worsening nightmares. She has been attending groups. She is seen waiting alongside phones. She is dressed in regular attire, with fair self-care. Affect constricted but does brighten at points during conversation. She is still endorsing ongoing depressed mood and anxiety surrounding her family conflicts and life changes. Still very preservative about not being able to go back to her family's home. Otherwise, no SI/HI/AVH. She is still often keeping to herself, remains isolative and guarded.        Sleep: normal  Appetite: normal  Medication side effects: No   ROS: no complaints, all other systems are negative  VS reviewed and stable    Mental Status Evaluation:    Appearance:  age appropriate, casually dressed, dressed appropriately   Behavior:  pleasant, cooperative, calm   Speech:  dysarthric   Mood:  depressed, anxious   Affect:  mood-congruent   Thought Process:  goal directed   Associations: intact associations   Thought Content:  negative thoughts, ruminating thoughts   Perceptual Disturbances: no auditory hallucinations, no visual hallucinations   Risk Potential: Suicidal ideation - None at present  Homicidal ideation - None at present  Potential for aggression - No   Sensorium:  oriented to person, place, and time/date   Memory:  recent and remote memory grossly intact   Consciousness:  alert and awake   Attention/Concentration: attention span and concentration are age appropriate   Insight:  limited   Judgment: limited   Gait/Station: uses walker   Motor Activity: abnormal movement noted: dyskinetic face movements present     Vital signs in last 24 hours:    Temp:  [97.2 °F (36.2 °C)-98.3 °F (36.8 °C)] 97.5 °F (36.4 °C)  HR:  [58-77] 58  Resp:  [16-18] 16  BP: (119-121)/(67-76) 120/68    Laboratory results: I have personally reviewed all pertinent laboratory/tests results    Results from the past 24 hours: No results found for this or any previous visit (from the past 24 hour(s)). Most Recent Labs:   Lab Results   Component Value Date    WBC 3.39 (L) 10/19/2023    RBC 4.08 10/19/2023    HGB 12.9 10/19/2023    HCT 38.6 10/19/2023     10/19/2023    RDW 13.4 10/19/2023    NEUTROABS 1.17 (L) 10/19/2023    TOTANEUTABS 0.83 (L) 05/25/2023    SODIUM 139 10/19/2023    K 3.6 10/19/2023     10/19/2023    CO2 28 10/19/2023    BUN 11 10/19/2023    CREATININE 0.66 10/19/2023    GLUC 86 10/19/2023    CALCIUM 8.7 10/19/2023    AST 24 10/18/2023    ALT 27 10/18/2023    ALKPHOS 102 10/18/2023    TP 6.2 (L) 10/18/2023    ALB 4.3 10/18/2023    TBILI 0.76 10/18/2023    CHOLESTEROL 153 10/19/2023    HDL 63 10/19/2023    TRIG 70 10/19/2023    LDLCALC 76 10/19/2023    [address] 90 10/19/2023    LITHIUM <0.1 (L) 03/23/2023    AMMONIA <10 (L) 06/27/2021    [id number] 0.765 10/18/2023    FREET4 0.80 02/13/2020    PREGSERUM Negative 02/22/2014    HGBA1C 4.5 07/19/2023    EAG 82 07/19/2023       Progress Toward Goals: progressing    Assessment/Plan   Principal Problem:    Generalized anxiety disorder with panic attacks  Active Problems:    Post traumatic stress disorder    Depression      Recommended Treatment:     Planned medication and treatment changes:     All current active medications have been reviewed  Encourage group therapy, milieu therapy and occupational therapy  Behavioral Health checks every 7 minutes    Continue current medications and therapy  DC planning ongoing    Current Facility-Administered Medications   Medication Dose Route Frequency Provider Last Rate    acetaminophen  650 mg Oral Q4H PRN Martha Corrigannall, PA-C      acetaminophen  975 mg Oral Q6H PRN Martha L Dagnall, PA-C      aluminum-magnesium hydroxide-simethicone  30 mL Oral Q4H PRN Kuldip Grey MD      amLODIPine  10 mg Oral Daily Kuldip Grey MD      aspirin  81 mg Oral Daily Martha HAVEN Sampson, PA-C      atorvastatin  40 mg Oral Daily With Dinner Kuldip Grey MD      [START ON 12/10/2023] cholecalciferol  1,000 Units Oral Daily Martha HAVEN Corrigannall, PA-C      clonazePAM  1 mg Oral HS Rudy A Prayson, DO      clonazePAM  1 mg Oral Daily Fleeta Flatten Stives, PA-C      cyanocobalamin  1,000 mcg Intramuscular Q30 Days Martha HAVEN Kimballl, PA-C      ergocalciferol  50,000 Units Oral Weekly Martha HAVEN Corrigandaniel, PA-C      ferrous sulfate  325 mg Oral Every Other Day Martha OROURKE ALISON BradenC      haloperidol lactate  5 mg Intramuscular Q4H PRN Max 4/day Kuldip Grey MD      hydrOXYzine HCL  25 mg Oral Q6H PRN Max 4/day Kuldip Grey MD      influenza vaccine  0.5 mL Intramuscular Prior to discharge Marina Trujillo MD      loratadine  10 mg Oral Daily Martha HAVEN ALISON BradenC      LORazepam  1 mg Intramuscular Q6H PRN Max 3/day Kuldip Grey MD      melatonin  6 mg Oral HS Teretha Phoenix, DO      nicotine polacrilex  4 mg Oral Q2H PRN Kuldip Grey MD      pantoprazole  40 mg Oral Early Morning Kuldip Grey MD      polyethylene glycol  17 g Oral Daily PRN Martha HAVEN ALISON BradenC      prazosin  1 mg Oral HS Rudy A Prayson, DO      pregabalin  100 mg Oral TID Kuldip Grey MD      QUEtiapine  200 mg Oral HS Teretha Phoenix, DO      risperiDONE  0.25 mg Oral Q4H PRN Max 6/day Kuldip Grey MD      risperiDONE  0.5 mg Oral Q4H PRN Max 3/day Kuldip Grey MD      risperiDONE  1 mg Oral Q2H PRN Max 3/day Kuldip Grey MD      [START ON 10/27/2023] sertraline  75 mg Oral Daily Hank Brock PA-C      traMADol  50 mg Oral Q6H PRN Marina Trujillo MD       Risks / Benefits of Treatment:    Risks, benefits, and possible side effects of medications explained to patient and patient verbalizes understanding and agreement for treatment. Counseling / Coordination of Care: Total floor / unit time spent today 20 minutes. Greater than 50% of total time was spent with the patient and / or family counseling and / or coordination of care. A description of counseling / coordination of care:  Patient's progress discussed with staff in treatment team meeting. Medications, treatment progress and treatment plan reviewed with patient.     Shanda Guzmán PA-C 10/26/23

## 2023-10-27 PROCEDURE — 97110 THERAPEUTIC EXERCISES: CPT

## 2023-10-27 PROCEDURE — 99232 SBSQ HOSP IP/OBS MODERATE 35: CPT

## 2023-10-27 PROCEDURE — 97530 THERAPEUTIC ACTIVITIES: CPT

## 2023-10-27 PROCEDURE — 97116 GAIT TRAINING THERAPY: CPT

## 2023-10-27 RX ORDER — FLUTICASONE PROPIONATE 50 MCG
2 SPRAY, SUSPENSION (ML) NASAL DAILY PRN
Status: DISCONTINUED | OUTPATIENT
Start: 2023-10-27 | End: 2023-11-10 | Stop reason: HOSPADM

## 2023-10-27 RX ADMIN — LORATADINE 10 MG: 10 TABLET ORAL at 08:12

## 2023-10-27 RX ADMIN — PREGABALIN 100 MG: 100 CAPSULE ORAL at 08:12

## 2023-10-27 RX ADMIN — CLONAZEPAM 1 MG: 1 TABLET ORAL at 20:40

## 2023-10-27 RX ADMIN — Medication 6 MG: at 20:40

## 2023-10-27 RX ADMIN — AMLODIPINE BESYLATE 10 MG: 10 TABLET ORAL at 08:11

## 2023-10-27 RX ADMIN — PREGABALIN 100 MG: 100 CAPSULE ORAL at 16:54

## 2023-10-27 RX ADMIN — POLYETHYLENE GLYCOL 3350 17 G: 17 POWDER, FOR SOLUTION ORAL at 16:53

## 2023-10-27 RX ADMIN — ASPIRIN 81 MG: 81 TABLET, COATED ORAL at 08:12

## 2023-10-27 RX ADMIN — PRAZOSIN HYDROCHLORIDE 1 MG: 1 CAPSULE ORAL at 20:39

## 2023-10-27 RX ADMIN — CLONAZEPAM 1 MG: 1 TABLET ORAL at 08:11

## 2023-10-27 RX ADMIN — PANTOPRAZOLE SODIUM 40 MG: 40 TABLET, DELAYED RELEASE ORAL at 05:57

## 2023-10-27 RX ADMIN — ATORVASTATIN CALCIUM 40 MG: 40 TABLET, FILM COATED ORAL at 16:54

## 2023-10-27 RX ADMIN — QUETIAPINE 200 MG: 50 TABLET, FILM COATED, EXTENDED RELEASE ORAL at 20:39

## 2023-10-27 RX ADMIN — PREGABALIN 100 MG: 100 CAPSULE ORAL at 20:40

## 2023-10-27 RX ADMIN — SERTRALINE HYDROCHLORIDE 75 MG: 50 TABLET ORAL at 08:11

## 2023-10-27 NOTE — PROGRESS NOTES
Progress Note - Behavioral Health   Samantha QUINONEZ Sujey Eye 59 y.o. female MRN: [id number]  Unit/Bed#: [facility] Encounter: [id number]    Assessment/Plan   Principal Problem:    Generalized anxiety disorder with panic attacks  Active Problems:    Post traumatic stress disorder    Depression      Subjective:Patient was seen today for continuation of care, records reviewed and  patient was discussed with the morning case review team. Pt endorses ongoing depression and anxiety related to family stressor regarding her son not allowing pt to live with him. Focused on family conflict. Validated her feelings, she is hopeful the increase in Zoloft will help manage symptoms. Noted to be seclusive to room, encouraged milieu therapy. Reports adequate sleep and appetite. Denies SI, HI, AH, VH, paranoia. Remains medication compliance. Denies any side effects from medications.     Psychiatric Review of Systems:    Sleep: normal  Appetite: normal  Medication side effects: No   ROS: all other systems are negative    Vitals:  Vitals:    10/27/23 0745   BP: 136/83   Pulse: 62   Resp: 16   Temp: 98.1 °F (36.7 °C)   SpO2: 100%       Mental Status Evaluation:    Appearance:  age appropriate, casually dressed, dressed appropriately   Behavior:  pleasant, cooperative, calm   Speech:  dysarthric   Mood:  depressed   Affect:  mood-congruent   Thought Process:  coherent, goal directed   Associations: intact associations   Thought Content:  negative thoughts   Perceptual Disturbances: no auditory hallucinations, no visual hallucinations   Risk Potential: Suicidal ideation - None at present  Homicidal ideation - None at present  Potential for aggression - No   Sensorium:  oriented to person, place, and time/date   Memory:  recent and remote memory grossly intact   Consciousness:  alert and awake   Attention: attention span and concentration are age appropriate   Insight:  limited   Judgment: limited   Gait/Station: uses walker   Motor Activity: abnormal movement noted: dyskinetic face movements present     Laboratory results:  I have personally reviewed all pertinent laboratory/tests results.   Most Recent Labs:   Lab Results   Component Value Date    WBC 3.39 (L) 10/19/2023    RBC 4.08 10/19/2023    HGB 12.9 10/19/2023    HCT 38.6 10/19/2023     10/19/2023    RDW 13.4 10/19/2023    TOTANEUTABS 0.83 (L) 05/25/2023    NEUTROABS 1.17 (L) 10/19/2023    SODIUM 139 10/19/2023    K 3.6 10/19/2023     10/19/2023    CO2 28 10/19/2023    BUN 11 10/19/2023    CREATININE 0.66 10/19/2023    GLUC 86 10/19/2023    GLUF 86 10/19/2023    CALCIUM 8.7 10/19/2023    AST 24 10/18/2023    ALT 27 10/18/2023    ALKPHOS 102 10/18/2023    TP 6.2 (L) 10/18/2023    ALB 4.3 10/18/2023    TBILI 0.76 10/18/2023    CHOLESTEROL 153 10/19/2023    HDL 63 10/19/2023    TRIG 70 10/19/2023    LDLCALC 76 10/19/2023    [address] 90 10/19/2023    LITHIUM <0.1 (L) 03/23/2023    AMMONIA <10 (L) 06/27/2021    [id number] 0.765 10/18/2023    FREET4 0.80 02/13/2020    PREGSERUM Negative 02/22/2014    RPR Non-Reactive 06/29/2021    HGBA1C 4.5 07/19/2023    EAG 82 07/19/2023         Recommended Treatment:     -Continue current medications    All current active medications have been reviewed  Encourage group therapy, milieu therapy and occupational therapy  Continue treatment with group therapy, milieu therapy and occupational therapy  Behavioral Health checks every 7 minutes  Continue current medications:  Current Facility-Administered Medications   Medication Dose Route Frequency Provider Last Rate    acetaminophen  650 mg Oral Q4H PRN Marthajessenia Braden PA-C      acetaminophen  975 mg Oral Q6H PRN Martha HAVEN Braden PA-C      aluminum-magnesium hydroxide-simethicone  30 mL Oral Q4H PRN Modesto Poole MD      amLODIPine  10 mg Oral Daily Modesto Poole MD      aspirin  81 mg Oral Daily Martha Braden PA-C      atorvastatin  40 mg Oral Daily With Angel Solano MD [START ON 12/10/2023] cholecalciferol  1,000 Units Oral Daily Martha OROURKE JACQUI Braden      clonazePAM  1 mg Oral HS Rudy A Prayson, DO      clonazePAM  1 mg Oral Daily Mary Brock PA-C      cyanocobalamin  1,000 mcg Intramuscular Q30 Days Martha HAVEN SHAKILA Braden-C      Diclofenac Sodium  2 g Topical 4x Daily PRN Martha HAVEN SHAKILA Braden-C      ergocalciferol  50,000 Units Oral Weekly Martha HAVEN SHAKILA Braden-DAMIAN      ferrous sulfate  325 mg Oral Every Other Day Martha L ALISON BradenC      haloperidol lactate  5 mg Intramuscular Q4H PRN Max 4/day Charlotte Antonio MD      hydrOXYzine HCL  25 mg Oral Q6H PRN Max 4/day Charlotte Antonio MD      influenza vaccine  0.5 mL Intramuscular Prior to discharge Oswald Reed MD      loratadine  10 mg Oral Daily Martha Braden PA-C      LORazepam  1 mg Intramuscular Q6H PRN Max 3/day Charlotte Antonio MD      melatonin  6 mg Oral HS Kaleta Model, DO      nicotine polacrilex  4 mg Oral Q2H PRN Charlotte Antonio MD      pantoprazole  40 mg Oral Early Morning Charlotte Antonio MD      polyethylene glycol  17 g Oral Daily PRN Martha Braden PA-C      prazosin  1 mg Oral HS Rudy A Prayson, DO      pregabalin  100 mg Oral TID Charlotte Antonio MD      QUEtiapine  200 mg Oral HS Rudy A Prayson, DO      risperiDONE  0.25 mg Oral Q4H PRN Max 6/day Charlotte Antonio MD      risperiDONE  0.5 mg Oral Q4H PRN Max 3/day Charlotte Antonio MD      risperiDONE  1 mg Oral Q2H PRN Max 3/day Charlotte Antonio MD      sertraline  75 mg Oral Daily Mary Brock PA-C      traMADol  50 mg Oral Q6H PRN Oswald Reed MD         Risks / Benefits of Treatment:     Risks, benefits, and possible side effects of medications explained to patient. Patient has limited understanding of risks and benefits of treatment at this time, but agrees to take medications as prescribed. Counseling / Coordination of Care:     Patient's progress reviewed with nursing staff.   Medications, treatment progress and treatment plan reviewed with patient. Supportive counseling provided to the patient.           MERCY Rodriguez

## 2023-10-27 NOTE — PLAN OF CARE
Problem: PHYSICAL THERAPY ADULT  Goal: Performs mobility at highest level of function for planned discharge setting. See evaluation for individualized goals. Description: Treatment/Interventions: Functional transfer training, LE strengthening/ROM, Therapeutic exercise, Endurance training, Elevations, Patient/family training, Equipment eval/education, Bed mobility, Gait training, Spoke to nursing, Spoke to case management  Equipment Recommended:  (pt encouraged to use RW at this time)       See flowsheet documentation for full assessment, interventions and recommendations. Outcome: Progressing  Note: Prognosis: Good  Problem List: Decreased strength, Decreased endurance, Impaired balance, Decreased mobility, Pain  Assessment: Pt seen for PT treatment session this date with interventions consisting of gait training w/ emphasis on improving pt's ability to ambulate level surfaces x 200 ft with modified independent provided by therapist with RW, Therapeutic exercise consisting of: AROM 10 reps B LE in sitting position, and therapeutic activity consisting of training: sit<>stand transfers. Pt agreeable to PT treatment session upon arrival, pt found seated OOB in chair, in no apparent distress and responsive. In comparison to previous session, pt with improvements in distance ambulated . Post session: all needs in reach and RN notified of session findings/recommendations. Continue to recommend home with home health rehabilitation at time of d/c in order to maximize pt's functional independence and safety w/ mobility. Pt continues to be functioning below baseline level. PT will continue to see pt during current hospitalization in order to address the deficits listed above and provide interventions consistent w/ POC in effort to achieve STGs.   Barriers to Discharge: Inaccessible home environment, Decreased caregiver support     PT Discharge Recommendation: Home with home health rehabilitation    See flowsheet documentation for full assessment.

## 2023-10-27 NOTE — PLAN OF CARE
Problem: PAIN - ADULT  Goal: Verbalizes/displays adequate comfort level or baseline comfort level  Description: Interventions:  - Encourage patient to monitor pain and request assistance  - Assess pain using appropriate pain scale  - Administer analgesics based on type and severity of pain and evaluate response  - Implement non-pharmacological measures as appropriate and evaluate response  - Consider cultural and social influences on pain and pain management  - Notify physician/advanced practitioner if interventions unsuccessful or patient reports new pain  Outcome: Progressing     Problem: SAFETY ADULT  Goal: Patient will remain free of falls  Description: INTERVENTIONS:  - Educate patient/family on patient safety including physical limitations  - Instruct patient to call for assistance with activity   - Consult OT/PT to assist with strengthening/mobility   - Keep Call bell within reach  - Keep bed low and locked with side rails adjusted as appropriate  - Keep care items and personal belongings within reach  - Initiate and maintain comfort rounds  - Make Fall Risk Sign visible to staff  - Apply yellow socks and bracelet for high fall risk patients  - Consider moving patient to room near nurses station  Outcome: Progressing     Problem: Ineffective Coping  Goal: Demonstrates healthy coping skills  Outcome: Progressing  Goal: Participates in unit activities  Description: Interventions:  - Provide therapeutic environment   - Provide required programming   - Redirect inappropriate behaviors   Outcome: Progressing  Goal: Patient/Family participate in treatment and DC plans  Description: Interventions:  - Provide therapeutic environment  Outcome: Progressing  Goal: Patient/Family verbalizes awareness of resources  Outcome: Progressing  Goal: Understands least restrictive measures  Description: Interventions:  - Utilize least restrictive behavior  Outcome: Progressing  Goal: Free from restraint events  Description: - Utilize least restrictive measures   - Provide behavioral interventions   - Redirect inappropriate behaviors   Outcome: Progressing     Problem: Depression  Goal: Treatment Goal: Demonstrate behavioral control of depressive symptoms, verbalize feelings of improved mood/affect, and adopt new coping skills prior to discharge  Outcome: Progressing  Goal: Verbalize thoughts and feelings  Description: Interventions:  - Assess and re-assess patient's level of risk   - Engage patient in 1:1 interactions, daily, for a minimum of 15 minutes   - Encourage patient to express feelings, fears, frustrations, hopes   Outcome: Progressing  Goal: Refrain from harming self  Description: Interventions:  - Monitor patient closely, per order   - Supervise medication ingestion, monitor effects and side effects   Outcome: Progressing  Goal: Refrain from isolation  Description: Interventions:  - Develop a trusting relationship   - Encourage socialization   Outcome: Progressing  Goal: Refrain from self-neglect  Outcome: Progressing  Goal: Complete daily ADLs, including personal hygiene independently, as able  Description: Interventions:  - Observe, teach, and assist patient with ADLS  -  Monitor and promote a balance of rest/activity, with adequate nutrition and elimination   Outcome: Progressing     Problem: Anxiety  Goal: Anxiety is at manageable level  Description: Interventions:  - Assess and monitor patient's anxiety level. - Monitor for signs and symptoms (heart palpitations, chest pain, shortness of breath, headaches, nausea, feeling jumpy, restlessness, irritable, apprehensive). - Collaborate with interdisciplinary team and initiate plan and interventions as ordered.   - [place] patient to unit/surroundings  - Explain treatment plan  - Encourage participation in care  - Encourage verbalization of concerns/fears  - Identify coping mechanisms  - Assist in developing anxiety-reducing skills  - Administer/offer alternative therapies  - Limit or eliminate stimulants  Outcome: Progressing     Problem: Nutrition/Hydration-ADULT  Goal: Nutrient/Hydration intake appropriate for improving, restoring or maintaining nutritional needs  Description: Monitor and assess patient's nutrition/hydration status for malnutrition. Collaborate with interdisciplinary team and initiate plan and interventions as ordered. Monitor patient's weight and dietary intake as ordered or per policy. Utilize nutrition screening tool and intervene as necessary. Determine patient's food preferences and provide high-protein, high-caloric foods as appropriate.      INTERVENTIONS:  - Monitor oral intake, urinary output, labs, and treatment plans  - Assess nutrition and hydration status and recommend course of action  - Evaluate amount of meals eaten  - Assist patient with eating if necessary   - Allow adequate time for meals  - Recommend/ encourage appropriate diets, oral nutritional supplements, and vitamin/mineral supplements  - Order, calculate, and assess calorie counts as needed  - Recommend, monitor, and adjust tube feedings and TPN/PPN based on assessed needs  - Assess need for intravenous fluids  - Provide specific nutrition/hydration education as appropriate  - Include patient/family/caregiver in decisions related to nutrition  Outcome: Progressing     Problem: Prexisting or High Potential for Compromised Skin Integrity  Goal: Skin integrity is maintained or improved  Description: INTERVENTIONS:  - Identify patients at risk for skin breakdown  - Assess and monitor skin integrity  - Assess and monitor nutrition and hydration status  - Monitor labs   - Assess for incontinence   - Turn and reposition patient  - Assist with mobility/ambulation  - Relieve pressure over bony prominences  - Avoid friction and shearing  - Provide appropriate hygiene as needed including keeping skin clean and dry  - Evaluate need for skin moisturizer/barrier cream  - Collaborate with interdisciplinary team   - Patient/family teaching  - Consider wound care consult   Outcome: Progressing

## 2023-10-27 NOTE — NURSING NOTE
Patient was observed to be visible in the community this evening; spending time in the small dining area. She spent time reading a book; mostly withdrawn to herself. She endorses ongoing anxiety and depression, denies SI, HI and hallucinations. She is calm and pleasant. Samantha was medication compliant at . Medical clipboard request made for patient as she is requesting Voltaren gel for her back and b/l legs. She also c/o pruritus for which she was offered moisturing skin cream, c/o watery eyes and runny nose, stating it could be seasonal allergies. Utilizes a walker for mobility. Continuous safety rounding in progress.

## 2023-10-27 NOTE — PHYSICAL THERAPY NOTE
PHYSICAL THERAPY TREATMENT NOTE  NAME:  Samantha Boyer  DATE: 10/27/23    Length Of Stay: 8  Performed at least 2 patient identifiers during session: Name and ID bracelet    TREATMENT:      10/27/23 1342   PT Last Visit   PT Visit Date 10/27/23   Note Type   Note Type Treatment   Pain Assessment   Pain Assessment Tool 0-10   Pain Score No Pain   Restrictions/Precautions   Weight Bearing Precautions Per Order No   Other Precautions Cognitive; Fall Risk;Pain   General   Chart Reviewed Yes   Family/Caregiver Present No   Cognition   Arousal/Participation Alert; Responsive   Attention Within functional limits   Orientation Level Oriented X4   Memory Decreased recall of precautions;Decreased recall of recent events   Following Commands Follows one step commands without difficulty   Comments pt pleasant and cooperative   Transfers   Sit to Stand 6  Modified independent   Additional items Increased time required   Stand to Sit 6  Modified independent   Additional items Increased time required   Stand pivot 6  Modified independent   Additional items Increased time required  (RW)   Additional Comments RW utilized   Ambulation/Elevation   Gait pattern Improper Weight shift; Forward Flexion;Decreased foot clearance   Gait Assistance 6  Modified independent   Assistive Device Rolling walker   Distance 200 ftx1   Ambulation/Elevation Additional Comments verbal cues for upright posture   Balance   Static Sitting Normal   Dynamic Sitting Good   Static Standing Fair +   Dynamic Standing Fair   Ambulatory Fair   Endurance Deficit   Endurance Deficit Yes   Activity Tolerance   Activity Tolerance Patient limited by fatigue   Nurse Made Aware yes   Exercises   Quad Sets Sitting;10 reps;AROM; Bilateral   Heelslides Sitting;10 reps;AROM; Bilateral   Glute Sets Sitting;10 reps;AROM; Bilateral   Hip Abduction Sitting;10 reps;AROM; Bilateral   Hip Adduction Sitting;10 reps;AROM; Bilateral   Knee AROM Long Arc Quad Sitting;10 reps;AROM; Bilateral   Ankle Pumps Sitting;10 reps;AROM; Bilateral   Marching Sitting;10 reps;AROM; Bilateral   Assessment   Prognosis Good   Problem List Decreased strength;Decreased endurance; Impaired balance;Decreased mobility;Pain   Assessment Pt seen for PT treatment session this date with interventions consisting of gait training w/ emphasis on improving pt's ability to ambulate level surfaces x 200 ft with modified independent provided by therapist with RW, Therapeutic exercise consisting of: AROM 10 reps B LE in sitting position, and therapeutic activity consisting of training: sit<>stand transfers. Pt agreeable to PT treatment session upon arrival, pt found seated OOB in chair, in no apparent distress and responsive. In comparison to previous session, pt with improvements in distance ambulated . Post session: all needs in reach and RN notified of session findings/recommendations. Continue to recommend home with home health rehabilitation at time of d/c in order to maximize pt's functional independence and safety w/ mobility. Pt continues to be functioning below baseline level. PT will continue to see pt during current hospitalization in order to address the deficits listed above and provide interventions consistent w/ POC in effort to achieve STGs. Barriers to Discharge Inaccessible home environment;Decreased caregiver support   Goals   STG Expiration Date 11/09/23   Plan   Treatment/Interventions Functional transfer training;LE strengthening/ROM; Therapeutic exercise; Endurance training;Bed mobility;Gait training   PT Frequency   (2-5x/wk)   Discharge Recommendation   PT Discharge Recommendation Home with home health rehabilitation   AM-PAC Basic Mobility Inpatient   Turning in Flat Bed Without Bedrails 4   Lying on Back to Sitting on Edge of Flat Bed Without Bedrails 4   Moving Bed to Chair 4   Standing Up From Chair Using Arms 4   Walk in Room 4   Climb 3-5 Stairs With Railing 2   Basic Mobility Inpatient Raw Score 22   Basic Mobility Standardized Score 47.4   Highest Level Of Mobility   JH-HLM Goal 7: Walk 25 feet or more   JH-HLM Achieved 7: Walk 25 feet or more   End of Consult   Patient Position at End of Consult All needs within reach         The patient's AM-PAC Basic Mobility Inpatient Short Form Raw Score is 22. A Raw score of greater than 16 suggests the patient may benefit from discharge to home. Please also refer to the recommendation of the Physical Therapist for safe discharge planning.       Poornima Reynaga, PTA,PTA

## 2023-10-27 NOTE — NURSING NOTE
Pt c/o high depression & high anxiety over pt's son informed pt that she can't stay with him. Pt denies any hallucinations, suicidal or homicidal ideations. Q 7 min checks maintained to monitor pt's behavior & safety. Pt is selectively social & cooperative. Pt is compliant with medications. Pt up ad dudley with walker.

## 2023-10-27 NOTE — CMS CERTIFICATION NOTE
Recertification: Based upon physical, mental and social evaluations, I certify that inpatient psychiatric services continue to be medically necessary for this patient for a duration of 12 midnights for the treatment of  Generalized anxiety disorder with panic attacks Available alternative community resources still do not meet the patient's mental health care needs. I further attest that an established written individualized plan of care has been updated and is outlined in the patient's medical records.

## 2023-10-27 NOTE — PROGRESS NOTES
Progress Note - Samantha Abarca 59 y.o. female MRN: [id number]    Unit/Bed#: [facility] Encounter: [id number]        Subjective:   Patient seen and examined at bedside after reviewing the chart and discussing the case with the caring staff. Patient examined at bedside. Patient is requesting Voltaren gel for her back and leg pain. Physical Exam   Vitals: Blood pressure 136/83, pulse 62, temperature 98.1 °F (36.7 °C), temperature source Temporal, resp. rate 16, height 5' 1" (1.549 m), weight 81.7 kg (180 lb 3.2 oz), SpO2 100 %, not currently breastfeeding. ,Body mass index is 34.05 kg/m². Constitutional: Patient in no acute distress. HEENT: PERR, EOMI, MMM. Cardiovascular: Normal rate and regular rhythm. Pulmonary/Chest: Effort normal and breath sounds normal.   Abdomen: Soft, + BS, NT. Assessment/Plan:  Samantha Abarca is a(n) 59 y.o. female with panic disorder. 1. Cardiac with hx HTN, HLD. Continue amlodipine 10 mg daily, atorvastatin 40 mg daily, ASA 81 mg daily. 2. Tardive dyskinesia. Continue home valbenazine tosylate 40 mg daily. 3. Iron deficiency anemia. Patient is on ferrous sulfate 324 mg every other day. 4. DJD/OA/chronic back pain. Continue Lyrica 100 mg 3 times daily. Tylenol for mild/mod pain. Tramadol q6h prn for severe pain. Robaxin prn. Add Voltaren gel prn.  5. Gait abnormality. PT OT consulted for further evaluation. 6. GERD. Patient is on Protonix 40 mg daily. 7. Vitamin D deficiency. Patient started on vitamin D2 50,000 units weekly for 8 weeks followed by vitamin D3 1000 units daily. 8. Vitamin B12 deficiency. Patient started on vitamin B-12 monthly injections. 9. Weight gain. Dietitian to see patient. 10. Allergic rhinitis. Started on Claritin 10 mg daily 10/25/23. Flonase prn. The patient was discussed with Dr. Andrew Romero and he is in agreement with the above note.

## 2023-10-28 PROCEDURE — 99232 SBSQ HOSP IP/OBS MODERATE 35: CPT

## 2023-10-28 RX ADMIN — SERTRALINE HYDROCHLORIDE 75 MG: 50 TABLET ORAL at 08:14

## 2023-10-28 RX ADMIN — Medication 6 MG: at 21:33

## 2023-10-28 RX ADMIN — PREGABALIN 100 MG: 100 CAPSULE ORAL at 17:07

## 2023-10-28 RX ADMIN — CLONAZEPAM 1 MG: 1 TABLET ORAL at 08:14

## 2023-10-28 RX ADMIN — TRAMADOL HYDROCHLORIDE 50 MG: 50 TABLET, COATED ORAL at 06:01

## 2023-10-28 RX ADMIN — HYDROXYZINE HYDROCHLORIDE 25 MG: 25 TABLET ORAL at 18:24

## 2023-10-28 RX ADMIN — ERGOCALCIFEROL 50000 UNITS: 1.25 CAPSULE, LIQUID FILLED ORAL at 08:14

## 2023-10-28 RX ADMIN — PREGABALIN 100 MG: 100 CAPSULE ORAL at 08:14

## 2023-10-28 RX ADMIN — QUETIAPINE 200 MG: 50 TABLET, FILM COATED, EXTENDED RELEASE ORAL at 21:32

## 2023-10-28 RX ADMIN — FERROUS SULFATE TAB 325 MG (65 MG ELEMENTAL FE) 325 MG: 325 (65 FE) TAB at 08:14

## 2023-10-28 RX ADMIN — LORATADINE 10 MG: 10 TABLET ORAL at 08:14

## 2023-10-28 RX ADMIN — CLONAZEPAM 1 MG: 1 TABLET ORAL at 21:33

## 2023-10-28 RX ADMIN — PREGABALIN 100 MG: 100 CAPSULE ORAL at 21:33

## 2023-10-28 RX ADMIN — ATORVASTATIN CALCIUM 40 MG: 40 TABLET, FILM COATED ORAL at 17:07

## 2023-10-28 RX ADMIN — PRAZOSIN HYDROCHLORIDE 1 MG: 1 CAPSULE ORAL at 21:33

## 2023-10-28 RX ADMIN — AMLODIPINE BESYLATE 10 MG: 10 TABLET ORAL at 08:14

## 2023-10-28 RX ADMIN — ASPIRIN 81 MG: 81 TABLET, COATED ORAL at 08:14

## 2023-10-28 RX ADMIN — ACETAMINOPHEN 975 MG: 325 TABLET ORAL at 18:25

## 2023-10-28 RX ADMIN — PANTOPRAZOLE SODIUM 40 MG: 40 TABLET, DELAYED RELEASE ORAL at 05:59

## 2023-10-28 NOTE — NURSING NOTE
Patient resting in bed at this time. Pleasant and cooperative in interaction. Endorses mild anxiety/depression due to "personal worry". Denies SI/HI, hallucinations. Remains on continual rounding for safety and behaviors.

## 2023-10-28 NOTE — NURSING NOTE
Upon assessment pt is calm and cooperative. She has been compliant with her scheduled medications. Her appetite is good. She remains primarily withdrawn to her room- encouragement to attend meals and groups provided. She continues to report anxiety and depression mostly related to the relationship she has with her family. She denies Si/Hi and hallucinations. She denies pain at this time. She is able to make her needs known. Plan of care continues. Q7 minute safety checks in progress.

## 2023-10-28 NOTE — NURSING NOTE
Patient went to lie in bed stating she was not feeling well. Requested PRN Atarax and Tylenol for pain. Patient stated she had mild anxiety, unable to identify trigger. No outward signs of distress. PRN Atarax given for anxiety, Allen score of 13. Remains on continual rounding for safety and behaviors.

## 2023-10-28 NOTE — PROGRESS NOTES
Progress Note - Behavioral Health   Samantha Levin 59 y.o. female MRN: [id number]  Unit/Bed#: [facility] Encounter: [id number]    Patient was seen today for continuation of care, records reviewed and patient was discussed with the morning case review team.    Samantha was seen today for psychiatric follow-up. On assessment today, Samantha was seen in the consult room. Samantha presents as mildly anxious, she is cooperative with the interview today. She is somewhat perseverative about discharge disposition but is redirectable. She continues to endorse anxiety and depression. Zoloft increased to 75mg PO dailt yesterday and patient is tolerating. She is sleeping adequately and has a good appetite. Does stay withdrawn to room but was observed today reading in the group room. Samantha denies acute suicidal/self-harm ideation/intent/plan upon direct inquiry at this time. Samantha remains withdrawn but behaviorally controlled with no agitation or aggression noted on exam or in report. Samantha denies HI/AH/VH, and does not appear overtly manic. No overt delusions or paranoia are verbalized. Samantha remains adherent to her current psychotropic medication regimen and denies any side effects from medications, as well as none noted on exam.    Continue current medications, Zoloft increased yesterday to 75mg PO daily for ongoing depression and anxiety. Vitals:  Vitals:    10/28/23 0733   BP: 130/70   Pulse: 61   Resp: 18   Temp: 97.8 °F (36.6 °C)   SpO2: 97%       Laboratory Results:  I have personally reviewed all pertinent laboratory/tests results.   Most Recent Labs:   Lab Results   Component Value Date    WBC 3.39 (L) 10/19/2023    RBC 4.08 10/19/2023    HGB 12.9 10/19/2023    HCT 38.6 10/19/2023     10/19/2023    RDW 13.4 10/19/2023    TOTANEUTABS 0.83 (L) 05/25/2023    NEUTROABS 1.17 (L) 10/19/2023    SODIUM 139 10/19/2023    K 3.6 10/19/2023     10/19/2023    CO2 28 10/19/2023    BUN 11 10/19/2023 CREATININE 0.66 10/19/2023    GLUC 86 10/19/2023    GLUF 86 10/19/2023    CALCIUM 8.7 10/19/2023    AST 24 10/18/2023    ALT 27 10/18/2023    ALKPHOS 102 10/18/2023    TP 6.2 (L) 10/18/2023    ALB 4.3 10/18/2023    TBILI 0.76 10/18/2023    CHOLESTEROL 153 10/19/2023    HDL 63 10/19/2023    TRIG 70 10/19/2023    LDLCALC 76 10/19/2023    NONHDLC 90 10/19/2023    LITHIUM <0.1 (L) 03/23/2023    AMMONIA <10 (L) 06/27/2021    NTN1UPHFIYTB 0.765 10/18/2023    FREET4 0.80 02/13/2020    PREGSERUM Negative 02/22/2014    RPR Non-Reactive 06/29/2021    HGBA1C 4.5 07/19/2023    EAG 82 07/19/2023       Psychiatric Review of Systems:  Behavior over the last 24 hours:  unchanged.    Sleep: adequate  Appetite: adequate  Medication side effects: denies  ROS: no complaints, denies shortness of breath or chest pain and all other systems are negative for acute changes    Mental Status Evaluation:    Appearance:  casually dressed, adequate grooming   Behavior:  cooperative, calm   Speech:  normal rate and volume   Mood:  anxious   Affect:  constricted   Thought Process:  perseverative   Associations: concrete associations   Thought Content:  no overt delusions   Perceptual Disturbances: denies auditory or visual hallucinations when asked, does not appear responding to internal stimuli   Risk Potential: Suicidal ideation - None at present, contracts for safety on the unit, would talk to staff if not feeling safe on the unit  Homicidal ideation - None  Potential for aggression - No   Sensorium:  oriented to person and situation   Memory:  recent memory intact   Consciousness:  alert and awake   Attention/Concentration: attention span and concentration appear shorter than expected for age   Insight:  limited   Judgment: limited   Gait/Station: also uses walker   Motor Activity: abnormal movement noted: dyskinetic face movements present     Progress Toward Goals:   Samantha is progressing towards goals of inpatient psychiatric treatment by continued medication compliance and is attending therapeutic modalities on the milieu. However, the patient continues to require inpatient psychiatric hospitalization for continued medication management and titration to optimize symptom reduction, improve sleep hygiene, and demonstrate adequate self-care. Risk of Harm to Self:   Nursing Suicide Risk Assessment Last 24 hours: C-SSRS Risk (Since Last Contact)  Calculated C-SSRS Risk Score (Since Last Contact): No Risk Indicated  Current Specific Risk Factors include: mental illness diagnosis  Protective Factors: no current suicidal ideation, ability to communicate with staff on the unit, able to contract for safety on the unit, improved anxiety symptoms, responds to redirection  Based on today's assessment, Samantha presents the following risk of harm to self: minimal    Risk of Harm to Others:  Nursing Homicide Risk Assessment: Violence Risk to Others: Denies within past 6 months  Current Specific Risk Factors include: none  Protective Factors: no current homicidal ideation  Based on today's assessment, Samantha presents the following risk of harm to others: minimal    The following interventions are recommended: behavioral checks every 7 minutes, continued hospitalization on locked unit      Assessment/Plan   Principal Problem:    Generalized anxiety disorder with panic attacks  Active Problems:    Post traumatic stress disorder    Depression      Recommended Treatment: Treatment plan and medication changes discussed and per the attending physician the plan is: 1. Continue with group therapy, milieu therapy and occupational therapy  2. Behavioral Health checks every 7 minutes  3. Continue frequent safety checks and vitals per unit protocol  4. Continue with SLIM medical management as indicated  5. Continue with current medication regimen  6. Will review labs in the a.m.   7.Disposition Planning: Discharge planning and efforts remain ongoing    [address] [place] [address] Medications: all current active meds have been reviewed and continue current psychiatric medications.   Current Facility-Administered Medications   Medication Dose Route Frequency Provider Last Rate    acetaminophen  650 mg Oral Q4H PRN Martha Braden PA-C      acetaminophen  975 mg Oral Q6H PRN Martha Braden PA-C      aluminum-magnesium hydroxide-simethicone  30 mL Oral Q4H PRN Junaid Allen MD      amLODIPine  10 mg Oral Daily Junaid Allen MD      aspirin  81 mg Oral Daily Martha Braden PA-C      atorvastatin  40 mg Oral Daily With Dinner Junaid Allen MD      [START ON 12/10/2023] cholecalciferol  1,000 Units Oral Daily Martha Braden PA-C      clonazePAM  1 mg Oral HS Rudysuzanne York,       clonazePAM  1 mg Oral Daily Bre Brock PA-C      cyanocobalamin  1,000 mcg Intramuscular Q30 Days Martha Braden PA-C      Diclofenac Sodium  2 g Topical 4x Daily PRN Martha Braden PA-C      ergocalciferol  50,000 Units Oral Weekly Martha Braden PA-C      ferrous sulfate  325 mg Oral Every Other Day Martha Braden PA-C      fluticasone  2 spray Each Nare Daily PRN Martha Braden PA-C      haloperidol lactate  5 mg Intramuscular Q4H PRN Max 4/day Junaid Allen MD      hydrOXYzine HCL  25 mg Oral Q6H PRN Max 4/day Junaid Allen MD      influenza vaccine  0.5 mL Intramuscular Prior to discharge Rakel Vigil MD      loratadine  10 mg Oral Daily Martha Braden PA-C      LORazepam  1 mg Intramuscular Q6H PRN Max 3/day Junaid Allen MD      melatonin  6 mg Oral HS Rudy A Prayson, DO      nicotine polacrilex  4 mg Oral Q2H PRN Junaid Allen MD      pantoprazole  40 mg Oral Early Morning Junaid Allen MD      polyethylene glycol  17 g Oral Daily PRN Martha Braden PA-C      prazosin  1 mg Oral HS Rudy A Prayson, DO      pregabalin  100 mg Oral TID Junaid Allen MD      QUEtiapine  200 mg Oral HS Supa Boston DO risperiDONE  0.25 mg Oral Q4H PRN Max 6/day Kuldip Grey MD      risperiDONE  0.5 mg Oral Q4H PRN Max 3/day Kuldip Grey MD      risperiDONE  1 mg Oral Q2H PRN Max 3/day Kuldip Grey MD      sertraline  75 mg Oral Daily Hank Ferrari PA-C      traMADol  50 mg Oral Q6H PRN Marina Trujillo MD         Risks / Benefits of Treatment:  Risks, benefits, and possible side effects of medications explained to patient and patient verbalizes understanding and agreement for treatment. Counseling / Coordination of Care:  Patient's progress reviewed with nursing staff. Medications, treatment progress and treatment plan reviewed with patient. Supportive counseling provided to the patient. Total floor/unit time spent today 25 minutes. Greater than 50% of total time was spent with the patient and / or family counseling and / or coordination of care. A description of the counseling / coordination of care: medication education, treatment plan, supportive therapy.

## 2023-10-28 NOTE — PLAN OF CARE
Problem: Depression  Goal: Treatment Goal: Demonstrate behavioral control of depressive symptoms, verbalize feelings of improved mood/affect, and adopt new coping skills prior to discharge  Outcome: Progressing  Goal: Refrain from harming self  Description: Interventions:  - Monitor patient closely, per order   - Supervise medication ingestion, monitor effects and side effects   Outcome: Progressing  Goal: Refrain from isolation  Description: Interventions:  - Develop a trusting relationship   - Encourage socialization   Outcome: Progressing     Problem: Anxiety  Goal: Anxiety is at manageable level  Description: Interventions:  - Assess and monitor patient's anxiety level. - Monitor for signs and symptoms (heart palpitations, chest pain, shortness of breath, headaches, nausea, feeling jumpy, restlessness, irritable, apprehensive). - Collaborate with interdisciplinary team and initiate plan and interventions as ordered.   - [place] patient to unit/surroundings  - Explain treatment plan  - Encourage participation in care  - Encourage verbalization of concerns/fears  - Identify coping mechanisms  - Assist in developing anxiety-reducing skills  - Administer/offer alternative therapies  - Limit or eliminate stimulants  Outcome: Progressing

## 2023-10-28 NOTE — NURSING NOTE
Patient seen at the  [facility] and [facility]. She is calm and stable. Participated group activities. Compliant with evening medications. No complaints of S/I,H/I and AH. Denies any pain at present. Positive for evening snacks. Maintain on q7 min safety checks. Will continue to monitor.

## 2023-10-28 NOTE — PROGRESS NOTES
Progress Note - Samantha Kirkland 59 y.o. female MRN: [id number]    Unit/Bed#: [facility] Encounter: [id number]        Subjective:   Patient seen and examined at bedside after reviewing the chart and discussing the case with the caring staff. Patient examined at bedside. Patient has no acute complaints. Physical Exam   Vitals: Blood pressure 130/70, pulse 61, temperature 97.8 °F (36.6 °C), temperature source Temporal, resp. rate 18, height 5' 1" (1.549 m), weight 85 kg (187 lb 6.4 oz), SpO2 97 %, not currently breastfeeding. ,Body mass index is 35.41 kg/m². Constitutional: Patient in no acute distress. HEENT: PERR, EOMI, MMM. Cardiovascular: Normal rate and regular rhythm. Pulmonary/Chest: Effort normal and breath sounds normal.   Abdomen: Soft, + BS, NT. Assessment/Plan:  Samantha Kirkland is a(n) 59 y.o. female with panic disorder. 1. Cardiac with hx HTN, HLD. Continue amlodipine 10 mg daily, atorvastatin 40 mg daily, ASA 81 mg daily. 2. Tardive dyskinesia. Continue home valbenazine tosylate 40 mg daily. 3. Iron deficiency anemia. Patient is on ferrous sulfate 324 mg every other day. 4. DJD/OA/chronic back pain. Continue Lyrica 100 mg 3 times daily. Tylenol for mild/mod pain. Tramadol q6h prn for severe pain. Robaxin prn. Add Voltaren gel prn.  5. Gait abnormality. PT OT consulted for further evaluation. 6. GERD. Patient is on Protonix 40 mg daily. 7. Vitamin D deficiency. Patient started on vitamin D2 50,000 units weekly for 8 weeks followed by vitamin D3 1000 units daily. 8. Vitamin B12 deficiency. Patient started on vitamin B-12 monthly injections. 9. Weight gain. Dietitian to see patient. 10. Allergic rhinitis. Started on Claritin 10 mg daily 10/25/23. Flonase prn. The patient was discussed with Dr. Oskar Steele and he is in agreement with the above note.

## 2023-10-29 PROCEDURE — 99232 SBSQ HOSP IP/OBS MODERATE 35: CPT

## 2023-10-29 RX ADMIN — LORATADINE 10 MG: 10 TABLET ORAL at 08:32

## 2023-10-29 RX ADMIN — CLONAZEPAM 1 MG: 1 TABLET ORAL at 21:57

## 2023-10-29 RX ADMIN — SERTRALINE HYDROCHLORIDE 75 MG: 50 TABLET ORAL at 08:32

## 2023-10-29 RX ADMIN — CLONAZEPAM 1 MG: 1 TABLET ORAL at 08:32

## 2023-10-29 RX ADMIN — DICLOFENAC SODIUM 2 G: 10 GEL TOPICAL at 08:33

## 2023-10-29 RX ADMIN — ATORVASTATIN CALCIUM 40 MG: 40 TABLET, FILM COATED ORAL at 16:38

## 2023-10-29 RX ADMIN — Medication 6 MG: at 21:57

## 2023-10-29 RX ADMIN — PANTOPRAZOLE SODIUM 40 MG: 40 TABLET, DELAYED RELEASE ORAL at 06:04

## 2023-10-29 RX ADMIN — HYDROXYZINE HYDROCHLORIDE 25 MG: 25 TABLET ORAL at 17:52

## 2023-10-29 RX ADMIN — PREGABALIN 100 MG: 100 CAPSULE ORAL at 21:57

## 2023-10-29 RX ADMIN — PREGABALIN 100 MG: 100 CAPSULE ORAL at 08:32

## 2023-10-29 RX ADMIN — ASPIRIN 81 MG: 81 TABLET, COATED ORAL at 08:32

## 2023-10-29 RX ADMIN — TRAMADOL HYDROCHLORIDE 50 MG: 50 TABLET, COATED ORAL at 06:05

## 2023-10-29 RX ADMIN — AMLODIPINE BESYLATE 10 MG: 10 TABLET ORAL at 08:32

## 2023-10-29 RX ADMIN — QUETIAPINE 200 MG: 50 TABLET, FILM COATED, EXTENDED RELEASE ORAL at 21:57

## 2023-10-29 RX ADMIN — PREGABALIN 100 MG: 100 CAPSULE ORAL at 16:38

## 2023-10-29 NOTE — NURSING NOTE
Patient visible on the unit. Pleasant and cooperative. Denies SI, HI, and hallucinations. Endorses mild anxiety and depression. Compliant with medications. Able to make needs known. Q 7 minute checks maintained. Will continue to monitor and access.

## 2023-10-29 NOTE — NURSING NOTE
Patient seen at the [facility] and social. Participated group activities. Walks with a walker. Compliant with evening medications. No complaints of S/I,H/I and AH. Denies any pain at present. Positive for evening snacks. Maintain on q7 min safety checks. Will continue to monitor.

## 2023-10-29 NOTE — PLAN OF CARE
Problem: Depression  Goal: Treatment Goal: Demonstrate behavioral control of depressive symptoms, verbalize feelings of improved mood/affect, and adopt new coping skills prior to discharge  Outcome: Progressing  Goal: Refrain from harming self  Description: Interventions:  - Monitor patient closely, per order   - Supervise medication ingestion, monitor effects and side effects   Outcome: Progressing  Goal: Refrain from isolation  Description: Interventions:  - Develop a trusting relationship   - Encourage socialization   Outcome: Progressing     Problem: Anxiety  Goal: Anxiety is at manageable level  Description: Interventions:  - Assess and monitor patient's anxiety level. - Monitor for signs and symptoms (heart palpitations, chest pain, shortness of breath, headaches, nausea, feeling jumpy, restlessness, irritable, apprehensive). - Collaborate with interdisciplinary team and initiate plan and interventions as ordered.   - [place] patient to unit/surroundings  - Explain treatment plan  - Encourage participation in care  - Encourage verbalization of concerns/fears  - Identify coping mechanisms  - Assist in developing anxiety-reducing skills  - Administer/offer alternative therapies  - Limit or eliminate stimulants  Outcome: Progressing

## 2023-10-29 NOTE — PROGRESS NOTES
Progress Note - Behavioral Health   Samantha Lyon 59 y.o. female MRN: [id number]  Unit/Bed#: [facility] Encounter: [id number]    Patient was seen today for continuation of care, records reviewed and patient was discussed with the morning case review team.    Samantha was seen today for psychiatric follow-up. On assessment today, Samantha was seen in the group room away from peers. Samantha continues to report some anxiety and depression. She did sleep well last night. Complaints of generalized pain that Voltaren gel is helpful for, she was advise Voltaren is available PRN. No other complaints today, no behavioral changes in the past 24 hours. Less perseverative about discharge disposition. Samantha denies acute suicidal/self-harm ideation/intent/plan upon direct inquiry at this time. Smaantha remains behaviorally controlled with no agitation or aggression noted on exam or in report. Samantha denies HI/AH/VH, and does not appear overtly manic. No overt delusions or paranoia are verbalized. Samantha remains adherent to her current psychotropic medication regimen and denies any side effects from medications, as well as none noted on exam.    Continue current medications, consider further titration of Zoloft this week if continued anxiety and depression. Vitals:  Vitals:    10/29/23 0800   BP: 120/73   Pulse: 66   Resp: 16   Temp: 97.5 °F (36.4 °C)   SpO2: 99%       Laboratory Results:  I have personally reviewed all pertinent laboratory/tests results.   Most Recent Labs:   Lab Results   Component Value Date    WBC 3.39 (L) 10/19/2023    RBC 4.08 10/19/2023    HGB 12.9 10/19/2023    HCT 38.6 10/19/2023     10/19/2023    RDW 13.4 10/19/2023    TOTANEUTABS 0.83 (L) 05/25/2023    NEUTROABS 1.17 (L) 10/19/2023    SODIUM 139 10/19/2023    K 3.6 10/19/2023     10/19/2023    CO2 28 10/19/2023    BUN 11 10/19/2023    CREATININE 0.66 10/19/2023    GLUC 86 10/19/2023    GLUF 86 10/19/2023    CALCIUM 8.7 10/19/2023    AST 24 10/18/2023    ALT 27 10/18/2023    ALKPHOS 102 10/18/2023    TP 6.2 (L) 10/18/2023    ALB 4.3 10/18/2023    TBILI 0.76 10/18/2023    CHOLESTEROL 153 10/19/2023    HDL 63 10/19/2023    TRIG 70 10/19/2023    LDLCALC 76 10/19/2023    NONHDLC 90 10/19/2023    LITHIUM <0.1 (L) 03/23/2023    AMMONIA <10 (L) 06/27/2021    RAT1YOVZQAEP 0.765 10/18/2023    FREET4 0.80 02/13/2020    PREGSERUM Negative 02/22/2014    RPR Non-Reactive 06/29/2021    HGBA1C 4.5 07/19/2023    EAG 82 07/19/2023       Psychiatric Review of Systems:  Behavior over the last 24 hours:  unchanged.    Sleep: adequate  Appetite: adequate  Medication side effects: denies  ROS:  generalized aches and pain , denies shortness of breath or chest pain and all other systems are negative for acute changes    Mental Status Evaluation:    Appearance:  casually dressed, adequate grooming   Behavior:  cooperative, calm   Speech:  normal rate, soft   Mood:  less anxious   Affect:  constricted   Thought Process:  less perseverative   Associations: concrete associations   Thought Content:  no overt delusions   Perceptual Disturbances: denies auditory or visual hallucinations when asked, does not appear responding to internal stimuli   Risk Potential: Suicidal ideation - None at present, contracts for safety on the unit, would talk to staff if not feeling safe on the unit  Homicidal ideation - None  Potential for aggression - No   Sensorium:  oriented to person and situation   Memory:  recent memory intact   Consciousness:  alert and awake   Attention/Concentration: attention span and concentration appear shorter than expected for age   Insight:  limited   Judgment: limited   Gait/Station: also uses walker   Motor Activity: abnormal movement noted: dyskinetic face movements present     Progress Toward Goals:   Saamntha is progressing towards goals of inpatient psychiatric treatment by continued medication compliance and is attending therapeutic modalities on the milieu. However, the patient continues to require inpatient psychiatric hospitalization for continued medication management and titration to optimize symptom reduction, improve sleep hygiene, and demonstrate adequate self-care. Risk of Harm to Self:   Nursing Suicide Risk Assessment Last 24 hours: C-SSRS Risk (Since Last Contact)  Calculated C-SSRS Risk Score (Since Last Contact): No Risk Indicated  Current Specific Risk Factors include: mental illness diagnosis  Protective Factors: no current suicidal ideation, ability to communicate with staff on the unit, able to contract for safety on the unit, improved anxiety symptoms, responds to redirection  Based on today's assessment, Samantha presents the following risk of harm to self: minimal    Risk of Harm to Others:  Nursing Homicide Risk Assessment: Violence Risk to Others: Denies within past 6 months  Current Specific Risk Factors include: none  Protective Factors: no current homicidal ideation  Based on today's assessment, Samantha presents the following risk of harm to others: minimal    The following interventions are recommended: behavioral checks every 7 minutes, continued hospitalization on locked unit      Assessment/Plan   Principal Problem:    Generalized anxiety disorder with panic attacks  Active Problems:    Post traumatic stress disorder    Depression      Recommended Treatment: Treatment plan and medication changes discussed and per the attending physician the plan is: 1. Continue with group therapy, milieu therapy and occupational therapy  2. Behavioral Health checks every 7 minutes  3. Continue frequent safety checks and vitals per unit protocol  4. Continue with SLIM medical management as indicated  5. Continue with current medication regimen  6. Will review labs in the a.m.   7.Disposition Planning: Discharge planning and efforts remain ongoing    Behavioral Health Medications: all current active meds have been reviewed and continue current psychiatric medications.   Current Facility-Administered Medications   Medication Dose Route Frequency Provider Last Rate    acetaminophen  650 mg Oral Q4H PRN Martha Braden PA-C      acetaminophen  975 mg Oral Q6H PRN Martha HAVEN SHAKILA Braden-DAMIAN      aluminum-magnesium hydroxide-simethicone  30 mL Oral Q4H PRN Hilton Osler, MD      amLODIPine  10 mg Oral Daily Hilton Osler, MD      aspirin  81 mg Oral Daily Martha Braden PA-C      atorvastatin  40 mg Oral Daily With Dinner Hilton Osler, MD      [START ON 12/10/2023] cholecalciferol  1,000 Units Oral Daily Martha Braden PA-C      clonazePAM  1 mg Oral HS Rudy A Prayson, DO      clonazePAM  1 mg Oral Daily Lizeth Aus StivesJACQUI      cyanocobalamin  1,000 mcg Intramuscular Q30 Days Martha Braden PA-C      Diclofenac Sodium  2 g Topical 4x Daily PRN Martha Braden PA-C      ergocalciferol  50,000 Units Oral Weekly Martha Braden PA-C      ferrous sulfate  325 mg Oral Every Other Day Martha Braden PA-C      fluticasone  2 spray Each Nare Daily PRN Martha Braden PA-C      haloperidol lactate  5 mg Intramuscular Q4H PRN Max 4/day Hilton Osler, MD      hydrOXYzine HCL  25 mg Oral Q6H PRN Max 4/day Hilton Osler, MD      influenza vaccine  0.5 mL Intramuscular Prior to discharge Hardeep Sanabria MD      loratadine  10 mg Oral Daily Martha Braden PA-C      LORazepam  1 mg Intramuscular Q6H PRN Max 3/day Hilton Osler, MD      melatonin  6 mg Oral HS Rudy A Prayson, DO      nicotine polacrilex  4 mg Oral Q2H PRN Hilton Osler, MD      pantoprazole  40 mg Oral Early Morning Hilton Osler, MD      polyethylene glycol  17 g Oral Daily PRN Martha Braden PA-C      prazosin  1 mg Oral HS Rudy A Prayson, DO      pregabalin  100 mg Oral TID Hilton Osler, MD      QUEtiapine  200 mg Oral HS Rodriguez Meredither, DO      risperiDONE  0.25 mg Oral Q4H PRN Max 6/day Hilton Osler, MD risperiDONE  0.5 mg Oral Q4H PRN Max 3/day Eloina Hernandez MD      risperiDONE  1 mg Oral Q2H PRN Max 3/day Eloina Hernandez MD      sertraline  75 mg Oral Daily Lucaskaris Ferrari PA-C      traMADol  50 mg Oral Q6H PRN Erlinda Calle MD         Risks / Benefits of Treatment:  Risks, benefits, and possible side effects of medications explained to patient and patient verbalizes understanding and agreement for treatment. Counseling / Coordination of Care:  Patient's progress reviewed with nursing staff. Medications, treatment progress and treatment plan reviewed with patient. Supportive counseling provided to the patient. Total floor/unit time spent today 25 minutes. Greater than 50% of total time was spent with the patient and / or family counseling and / or coordination of care. A description of the counseling / coordination of care: medication education, treatment plan, supportive therapy.

## 2023-10-29 NOTE — PLAN OF CARE
Problem: Ineffective Coping  Goal: Understands least restrictive measures  Description: Interventions:  - Utilize least restrictive behavior  Outcome: Progressing  Goal: Free from restraint events  Description: - Utilize least restrictive measures   - Provide behavioral interventions   - Redirect inappropriate behaviors   Outcome: Progressing     Problem: Depression  Goal: Verbalize thoughts and feelings  Description: Interventions:  - Assess and re-assess patient's level of risk   - Engage patient in 1:1 interactions, daily, for a minimum of 15 minutes   - Encourage patient to express feelings, fears, frustrations, hopes   Outcome: Progressing  Goal: Refrain from harming self  Description: Interventions:  - Monitor patient closely, per order   - Supervise medication ingestion, monitor effects and side effects   Outcome: Progressing  Goal: Refrain from isolation  Description: Interventions:  - Develop a trusting relationship   - Encourage socialization   Outcome: Progressing

## 2023-10-29 NOTE — NURSING NOTE
BLEPS assessment completed. Crackles rt base, otherwise clear upon auscultation. +1 edema noted BLE. Voltaren gel applied to lower back as requested by pt. Pt c/o moderate depression & moderate anxiety. Pt denies any hallucinations, suicidal or homicidal ideations. Q 7 min checks maintained to monitor pt's behavior & safety. Pt up ad dudley with walker. Pt is pleasant & selectively social with other patients. Pt is cooperative & compliant with medications.

## 2023-10-29 NOTE — PROGRESS NOTES
Progress Note - Samantha Lyon 59 y.o. female MRN: [id number]    Unit/Bed#: [facility] Encounter: [id number]        Subjective:   Patient seen and examined at bedside after reviewing the chart and discussing the case with the caring staff. Patient examined at bedside. Patient has no acute complaints. Physical Exam   Vitals: Blood pressure 120/73, pulse 66, temperature 97.5 °F (36.4 °C), temperature source Temporal, resp. rate 16, height 5' 1" (1.549 m), weight 85 kg (187 lb 6.4 oz), SpO2 99 %, not currently breastfeeding. ,Body mass index is 35.41 kg/m². Constitutional: Patient in no acute distress. HEENT: PERR, EOMI, MMM. Cardiovascular: Normal rate and regular rhythm, no wheezing, no rhonchi, no crackles noted. Pulmonary/Chest: Effort normal and breath sounds normal.   Abdomen: Soft, + BS, NT. Assessment/Plan:  Samantha Lyon is a(n) 59 y.o. female with panic disorder. 1. Cardiac with hx HTN, HLD. Continue amlodipine 10 mg daily, atorvastatin 40 mg daily, ASA 81 mg daily. 2. Tardive dyskinesia. Continue home valbenazine tosylate 40 mg daily. 3. Iron deficiency anemia. Patient is on ferrous sulfate 324 mg every other day. 4. DJD/OA/chronic back pain. Continue Lyrica 100 mg 3 times daily. Tylenol for mild/mod pain. Tramadol q6h prn for severe pain, Robaxin and Voltaren gel prn.  5. Gait abnormality. PT OT consulted for further evaluation. 6. GERD. Patient is on Protonix 40 mg daily. 7. Vitamin D deficiency. Patient started on vitamin D2 50,000 units weekly for 8 weeks followed by vitamin D3 1000 units daily. 8. Vitamin B12 deficiency. Patient started on vitamin B-12 monthly injections. 9. Weight gain. Dietitian to see patient. 10. Allergic rhinitis. Started on Claritin 10 mg daily 10/25/23. Flonase prn. The patient was discussed with Dr. Maegan Woodson and he is in agreement with the above note.

## 2023-10-30 PROCEDURE — 99232 SBSQ HOSP IP/OBS MODERATE 35: CPT | Performed by: PSYCHIATRY & NEUROLOGY

## 2023-10-30 PROCEDURE — 97110 THERAPEUTIC EXERCISES: CPT

## 2023-10-30 PROCEDURE — 97112 NEUROMUSCULAR REEDUCATION: CPT

## 2023-10-30 PROCEDURE — 97116 GAIT TRAINING THERAPY: CPT

## 2023-10-30 PROCEDURE — 97530 THERAPEUTIC ACTIVITIES: CPT

## 2023-10-30 RX ORDER — SERTRALINE HYDROCHLORIDE 100 MG/1
100 TABLET, FILM COATED ORAL DAILY
Status: DISCONTINUED | OUTPATIENT
Start: 2023-10-31 | End: 2023-11-02

## 2023-10-30 RX ADMIN — CLONAZEPAM 1 MG: 1 TABLET ORAL at 08:16

## 2023-10-30 RX ADMIN — Medication 6 MG: at 21:45

## 2023-10-30 RX ADMIN — SERTRALINE HYDROCHLORIDE 75 MG: 50 TABLET ORAL at 08:15

## 2023-10-30 RX ADMIN — ACETAMINOPHEN 975 MG: 325 TABLET ORAL at 12:19

## 2023-10-30 RX ADMIN — CLONAZEPAM 1 MG: 1 TABLET ORAL at 21:45

## 2023-10-30 RX ADMIN — PREGABALIN 100 MG: 100 CAPSULE ORAL at 16:09

## 2023-10-30 RX ADMIN — AMLODIPINE BESYLATE 10 MG: 10 TABLET ORAL at 08:16

## 2023-10-30 RX ADMIN — DICLOFENAC SODIUM 2 G: 10 GEL TOPICAL at 22:53

## 2023-10-30 RX ADMIN — ATORVASTATIN CALCIUM 40 MG: 40 TABLET, FILM COATED ORAL at 16:09

## 2023-10-30 RX ADMIN — LORATADINE 10 MG: 10 TABLET ORAL at 08:15

## 2023-10-30 RX ADMIN — PANTOPRAZOLE SODIUM 40 MG: 40 TABLET, DELAYED RELEASE ORAL at 06:10

## 2023-10-30 RX ADMIN — DICLOFENAC SODIUM 2 G: 10 GEL TOPICAL at 12:19

## 2023-10-30 RX ADMIN — PREGABALIN 100 MG: 100 CAPSULE ORAL at 21:45

## 2023-10-30 RX ADMIN — QUETIAPINE 200 MG: 50 TABLET, FILM COATED, EXTENDED RELEASE ORAL at 21:44

## 2023-10-30 RX ADMIN — PRAZOSIN HYDROCHLORIDE 1 MG: 1 CAPSULE ORAL at 21:44

## 2023-10-30 RX ADMIN — PREGABALIN 100 MG: 100 CAPSULE ORAL at 08:15

## 2023-10-30 RX ADMIN — ASPIRIN 81 MG: 81 TABLET, COATED ORAL at 08:15

## 2023-10-30 RX ADMIN — FERROUS SULFATE TAB 325 MG (65 MG ELEMENTAL FE) 325 MG: 325 (65 FE) TAB at 08:15

## 2023-10-30 NOTE — PROGRESS NOTES
10/30/23    Team Meeting   Meeting Type Daily Rounds   Team Members Present   Team Members Present Physician;Nurse;;Occupational Therapist   Physician Team Member Dr. Corinne Jc MD; SHAKILA Corral   Nursing Team Member Mehreen Callahan RN   Care Management Team Member MS Francois, [facility]   OT Team Member Jaime Nickerson   Patient/Family Present   Patient Present No   Patient's Family Present No   Will return home with family with waiver services. Will follow up with lvh. Moderate anxiety and depression, pleasant, cooperative, social. Medication adjustment.

## 2023-10-30 NOTE — NURSING NOTE
Patient visible on the unit. Pleasant and cooperative. Denies SI, HI, hallucinations. Endorses moderate anxiety and  mild depression. Compliant with medications. Able to make needs known. Continuous rounding maintained. . Will continue to monitor and access.

## 2023-10-30 NOTE — NURSING NOTE
Pt found sitting on floor in juan bathroom @ 0745. Pt denies any pain or discomfort. RODRICK. TPR - 97.1-71-18. BP -136/67. O2 sat - 99%. Marleni Braden PA-C notified. Pt denies any hallucinations, suicidal or homicidal ideations. Pt c/o moderate depression & moderate anxiety. Q 7 min checks maintained to monitor pt's behavior & safety. Pt is cooperative & pleasant. Pt is selectively social & enjoys reading in her room. Pt is compliant with medications. Pt up ad dudley with walker.

## 2023-10-30 NOTE — OCCUPATIONAL THERAPY NOTE
10/30/23 1054   OT Last Visit   OT Visit Date 10/30/23   Note Type   Note Type Treatment   Pain Assessment   Pain Assessment Tool 0-10   Pain Score No Pain   Restrictions/Precautions   Weight Bearing Precautions Per Order No   Other Precautions Cognitive; Fall Risk;Pain   Therapeutic Excerise-Strength   UE Strength Yes   Right Upper Extremity- Strength   R Shoulder Flexion; Extension;Horizontal ABduction  (horiz. add, scap pro/ret)   R Elbow Elbow flexion;Elbow extension   R Wrist   (wrist rotation)   R Position Seated   Equipment Dumbbell   R Weight/Reps/Sets 1# weight x 2 sets of 10 reps   Left Upper Extremity-Strength   L Weights/Reps/Sets TE same as R UE above   Cognition   Arousal/Participation Alert; Responsive; Cooperative   Attention Within functional limits   Orientation Level Oriented X4   Memory Decreased recall of precautions;Decreased recall of recent events   Following Commands Follows one step commands without difficulty   Comments Pt agreeable to OT treatment   Activity Tolerance   Activity Tolerance Patient limited by fatigue   Assessment   Assessment Patient participated in Skilled OT session this date with interventions consisting of Energy Conservation techniques and therapeutic exercise to: increase functional use of BUEs, increase BUE muscle strength  . Patient agreeable to OT treatment session, upon arrival patient was found seated OOB to Chair, alert, responsive , and in no apparent distress. Patient tolerated UB TE as detailed in flow sheet. Following TE, patient declined further activity. Session ended with patient seated OOB to chair and all needs met. In comparison to previous session, patient with improvements in UB strength and endurance. Patient requiring verbal cues for correct technique, verbal cues for pacing thru activity steps, and frequent rest periods.  Patient performance  demonstrated good carryover of learned techniques and strategies to facilitate safety during functional tasks. Patient continues to be functioning below baseline level, occupational performance remains limited secondary to factors listed above and increased risk for falls and injury. From OT standpoint, recommendation at time of d/c would be Home OT. Patient to benefit from continued Occupational Therapy treatment while in the hospital to address deficits as defined above and maximize level of functional independence with ADLs and functional mobility in order to return to PLOF. Plan   Treatment Interventions UE strengthening/ROM; Energy conservation   Goal Expiration Date 11/09/23   OT Treatment Day 1   OT Frequency   (2-5x/wk)   Discharge Recommendation   Rehab Resource Intensity Level, OT III (Minimum Resource Intensity)   Additional Comments  The patient's raw score on the AM-PAC Daily Activity Inpatient Short Form is 19. A raw score of greater than or equal to 19 suggests the patient may benefit from discharge to home. Please refer to the recommendation of the Occupational Therapist for safe discharge planning.    AM-PAC Daily Activity Inpatient   Lower Body Dressing 2   Bathing 3   Toileting 3   Upper Body Dressing 3   Grooming 4   Eating 4   Daily Activity Raw Score 19   Daily Activity Standardized Score (Calc for Raw Score >=11) 40.22   AM-PAC Applied Cognition Inpatient   Following a Speech/Presentation 3   Understanding Ordinary Conversation 4   Taking Medications 3   Remembering Where Things Are Placed or Put Away 3   Remembering List of 4-5 Errands 3   Taking Care of Complicated Tasks 2   Applied Cognition Raw Score 18   Applied Cognition Standardized Score 38.07       Tabatha Hammonds

## 2023-10-30 NOTE — SOCIAL WORK
CM met with PT for PT check in. Reviewed that CM has reached out to PT daughter on several occasions without response. PT indicated that she has not spoken with her either. CM reviewed that she will reattempt tomorrow, PT in agreement. Reviewed that once we are able to collaborate with family we will be able to move forward with discharge planning. PT denies si/hi/ah/vh reported ongoing anxiety and depression. Reassurance provided.

## 2023-10-30 NOTE — PLAN OF CARE
Problem: PHYSICAL THERAPY ADULT  Goal: Performs mobility at highest level of function for planned discharge setting. See evaluation for individualized goals. Description: Treatment/Interventions: Functional transfer training, LE strengthening/ROM, Therapeutic exercise, Endurance training, Elevations, Patient/family training, Equipment eval/education, Bed mobility, Gait training, Spoke to nursing, Spoke to case management  Equipment Recommended:  (pt encouraged to use RW at this time)       See flowsheet documentation for full assessment, interventions and recommendations. Outcome: Progressing  Note: Prognosis: Good  Problem List: Decreased strength, Impaired balance, Decreased endurance, Decreased range of motion, Decreased mobility, Pain  Assessment: Pt seen for PT treatment session this date with interventions consisting of transfer training, gait training, toilet transfers, neuromuscular re-education of movement performed to improve dynamic standing balance, and education provided as needed for safety and direction to improve functional mobility, safety awareness, and activity tolerance. Pt agreeable to PT treatment session upon arrival, pt found sitting in small community room. At end of session, pt left sitting in small community room with all needs in reach. In comparison to previous session, pt with improvements in ambulation distance . Continue to recommend Home PT at time of d/c in order to maximize pt's functional independence and safety w/ mobility. Pt continues to be functioning below baseline level. PT will continue to see pt while here in order to address the deficits listed above and provide interventions consistent w/ POC in effort to achieve STGs. Barriers to Discharge: Inaccessible home environment, Decreased caregiver support     Rehab Resource Intensity Level, PT: III (Minimum Resource Intensity)    See flowsheet documentation for full assessment.

## 2023-10-30 NOTE — PHYSICAL THERAPY NOTE
PHYSICAL THERAPY TREATMENT NOTE  NAME:  Samantha Rascon Fillers  DATE: 10/30/23    Length Of Stay: 11  Performed at least 2 patient identifiers during session: Name and ID bracelet    TREATMENT FLOWSHEET:    10/30/23 1419   PT Last Visit   PT Visit Date 10/30/23   Note Type   Note Type Treatment   Pain Assessment   Pain Assessment Tool 0-10   Pain Score 6   Pain Location/Orientation Orientation: Lower; Location: Back   Pain Onset/Description Onset: Ongoing;Frequency: Intermittent; Descriptor: Aching   Patient's Stated Pain Goal No pain   Hospital Pain Intervention(s) Ambulation/increased activity   Restrictions/Precautions   Weight Bearing Precautions Per Order No   Other Precautions Cognitive; Fall Risk;Pain   General   Chart Reviewed Yes   Response to Previous Treatment Patient with no complaints from previous session. Family/Caregiver Present No   Cognition   Overall Cognitive Status Impaired   Arousal/Participation Alert; Responsive   Attention Within functional limits   Orientation Level Oriented X4   Memory Decreased recall of recent events   Following Commands Follows one step commands without difficulty   Subjective   Subjective "I do better with a rollator walker."   Bed Mobility   Additional Comments Pt. found sitting in small community room   Transfers   Sit to Stand 6  Modified independent   Additional items Armrests; Increased time required  (RW)   Stand to Sit 6  Modified independent   Additional items Armrests; Increased time required   Stand pivot 6  Modified independent   Additional items Increased time required;Armrests  (RW)   Toilet transfer 6  Modified independent   Additional items Increased time required;Standard toilet  (RW)   Additional Comments Pt. I with clothing management and hygiene needs   Ambulation/Elevation   Gait pattern Improper Weight shift;Decreased foot clearance; Forward Flexion   Gait Assistance 6  Modified independent   Assistive Device Rolling walker   Distance [id number]   Stair Management Assistance Not tested   Balance   Static Sitting Normal   Dynamic Sitting Good   Static Standing Fair +   Dynamic Standing Fair   Ambulatory Fair   Endurance Deficit   Endurance Deficit Yes   Activity Tolerance   Activity Tolerance Patient limited by fatigue;Patient limited by pain   Nurse Made Aware YES   Exercises   Neuro re-ed Patient performed dynamic standing balance activities before and after toileting while completing clothing management and hygiene needs unsupported with close S including multidirectional weight shifting, reaching across midline and out of CALLY, and anterior weight shift with forward reach picking up object from below waist line. Assessment   Prognosis Good   Problem List Decreased strength; Impaired balance;Decreased endurance;Decreased range of motion;Decreased mobility;Pain   Goals   Patient Goals to get her rollator back   PT Treatment Day 1   Plan   Treatment/Interventions Functional transfer training;LE strengthening/ROM; Elevations; Therapeutic exercise; Endurance training;Patient/family training;Equipment eval/education; Bed mobility; Compensatory technique education;Gait training;Spoke to nursing   Progress Progressing toward goals   PT Frequency Other (Comment)  (2-5x/wk)   Discharge Recommendation   Rehab Resource Intensity Level, PT III (Minimum Resource Intensity)   AM-PAC Basic Mobility Inpatient   Turning in Flat Bed Without Bedrails 4   Lying on Back to Sitting on Edge of Flat Bed Without Bedrails 4   Moving Bed to Chair 4   Standing Up From Chair Using Arms 4   Walk in Room 4   Climb 3-5 Stairs With Railing 2   Basic Mobility Inpatient Raw Score 22   Basic Mobility Standardized Score 47.4   Highest Level Of Mobility   JH-HLM Goal 7: Walk 25 feet or more   JH-HLM Achieved 8: Walk 250 feet ot more       The patient's AM-PAC Basic Mobility Inpatient Short Form Raw Score is 22. A raw score greater than 16 suggests the patient may benefit from discharge to home.  Please also refer to the recommendation of the Physical Therapist for safe discharge planning. Pt seen for PT treatment session this date with interventions consisting of transfer training, gait training, toilet transfers, neuromuscular re-education of movement performed to improve dynamic standing balance, and education provided as needed for safety and direction to improve functional mobility, safety awareness, and activity tolerance. Pt agreeable to PT treatment session upon arrival, pt found sitting in small community room. At end of session, pt left sitting in small community room with all needs in reach. In comparison to previous session, pt with improvements in ambulation distance . Continue to recommend Home PT at time of d/c in order to maximize pt's functional independence and safety w/ mobility. Pt continues to be functioning below baseline level. PT will continue to see pt while here in order to address the deficits listed above and provide interventions consistent w/ POC in effort to achieve STGs.     Carlene Meade

## 2023-10-30 NOTE — PROGRESS NOTES
Progress Note - Behavioral Health     Samantha Bettencourt 59 y.o. female MRN: [id number]   Unit/Bed#: [facility] 200-01 Encounter: [id number]    Behavior over the last 24 hours: unchanged. Samantha was seen today in follow-up. Visible and social throughout the milieu. Staff report positive interactions and has been generally to herself but pleasant and cooperative. No significant events reported overnight. She did except as needed hydroxyzine 25 mg yesterday for anxiety symptoms and tramadol 50 mg yesterday morning for pain management. He is seen sitting in group room eating breakfast.  She appears anxious, states that she had an event this morning where she fell off her shower chair. She reports feeling, "shaken up" and has been trying to calm herself. She does report ongoing depression and anxiety symptoms however did notes some improvement with increase in Zoloft. She states that she has been doing well in hospital with less intensity and frequency of panic attacks. She still worried about her discharge planning and is very preservative on this stating that she cannot go back home to live with her children. States that she continues to feel guilty frequently calling 911 out of fear. She denies SI/HI/AVH.   He is agreeable to increase in Zoloft today      Sleep: normal  Appetite: normal  Medication side effects: No   ROS: no complaints, all other systems are negative  VS reviewed and stable    Mental Status Evaluation:    Appearance:  age appropriate, casually dressed, dressed appropriately   Behavior:  pleasant, cooperative, calm   Speech:  dysarthric   Mood:  anxious, "shaken up."   Affect:  mood-congruent, anxious   Thought Process:  goal directed   Associations: intact associations   Thought Content:  negative thoughts, ruminating thoughts, perseverative    Perceptual Disturbances: no auditory hallucinations, no visual hallucinations   Risk Potential: Suicidal ideation - None at present  Homicidal ideation - None at present  Potential for aggression - No   Sensorium:  oriented to person, place, and time/date   Memory:  recent and remote memory grossly intact   Consciousness:  alert and awake   Attention/Concentration: attention span and concentration are age appropriate   Insight:  limited   Judgment: limited   Gait/Station: uses walker   Motor Activity: abnormal movement noted: dyskinetic face movements present     Vital signs in last 24 hours:    Temp:  [97.1 °F (36.2 °C)-97.8 °F (36.6 °C)] 97.1 °F (36.2 °C)  HR:  [66-71] 71  Resp:  [18] 18  BP: ()/(58-67) 136/67    Laboratory results: I have personally reviewed all pertinent laboratory/tests results    Results from the past 24 hours: No results found for this or any previous visit (from the past 24 hour(s)).   Most Recent Labs:   Lab Results   Component Value Date    WBC 3.39 (L) 10/19/2023    RBC 4.08 10/19/2023    HGB 12.9 10/19/2023    HCT 38.6 10/19/2023     10/19/2023    RDW 13.4 10/19/2023    NEUTROABS 1.17 (L) 10/19/2023    TOTANEUTABS 0.83 (L) 05/25/2023    SODIUM 139 10/19/2023    K 3.6 10/19/2023     10/19/2023    CO2 28 10/19/2023    BUN 11 10/19/2023    CREATININE 0.66 10/19/2023    GLUC 86 10/19/2023    CALCIUM 8.7 10/19/2023    AST 24 10/18/2023    ALT 27 10/18/2023    ALKPHOS 102 10/18/2023    TP 6.2 (L) 10/18/2023    ALB 4.3 10/18/2023    TBILI 0.76 10/18/2023    CHOLESTEROL 153 10/19/2023    HDL 63 10/19/2023    TRIG 70 10/19/2023    LDLCALC 76 10/19/2023    [address] 10/19/2023    LITHIUM <0.1 (L) 03/23/2023    AMMONIA <10 (L) 06/27/2021    [id number] 0.765 10/18/2023    FREET4 0.80 02/13/2020    PREGSERUM Negative 02/22/2014    HGBA1C 4.5 07/19/2023    EAG 82 07/19/2023       Progress Toward Goals: progressing    Assessment/Plan   Principal Problem:    Generalized anxiety disorder with panic attacks  Active Problems:    Post traumatic stress disorder    Depression      Recommended Treatment:     Planned medication and treatment changes:     All current active medications have been reviewed  Encourage group therapy, milieu therapy and occupational therapy  Behavioral Health checks every 7 minutes    Increase Zoloft to 100 mg every morning starting tomorrow   Continue all other medications  Discharge planning ongoing -continue with medication titration to help combat her ongoing anxiety and panic attacks; plan would be to get back home with family however not in agreement at this time    Current Facility-Administered Medications   Medication Dose Route Frequency Provider Last Rate    acetaminophen  650 mg Oral Q4H PRN SHAKILA Wren-C      acetaminophen  975 mg Oral Q6H PRN SHAKILA Wren-C      aluminum-magnesium hydroxide-simethicone  30 mL Oral Q4H PRN Adelita Lewis MD      amLODIPine  10 mg Oral Daily Adelita Lewis MD      aspirin  81 mg Oral Daily Martha Braden PA-C      atorvastatin  40 mg Oral Daily With Dinner Adelita Lewis MD      [START ON 12/10/2023] cholecalciferol  1,000 Units Oral Daily ALISON WrenC      clonazePAM  1 mg Oral HS Rudy York DO      clonazePAM  1 mg Oral Daily Josefina Brock PA-C      cyanocobalamin  1,000 mcg Intramuscular Q30 Days ALISON WrenC      Diclofenac Sodium  2 g Topical 4x Daily PRN SHAKILA Wren-DAMIAN      ergocalciferol  50,000 Units Oral Weekly SHAKILA Wren-C      ferrous sulfate  325 mg Oral Every Other Day SHAKILA Wren-C      fluticasone  2 spray Each Nare Daily PRN SHAKILA Wren-C      haloperidol lactate  5 mg Intramuscular Q4H PRN Max 4/day Adelita Lewis MD      hydrOXYzine HCL  25 mg Oral Q6H PRN Max 4/day Adelita Lewis MD      influenza vaccine  0.5 mL Intramuscular Prior to discharge Kathrin Monroe MD      loratadine  10 mg Oral Daily Martha Braden PA-C      LORazepam  1 mg Intramuscular Q6H PRN Max 3/day Adelita Lewis MD      melatonin  6 mg Oral HS Polly Skiff Prayson, DO      nicotine polacrilex  4 mg Oral Q2H PRN Winter Mazariegos MD      pantoprazole  40 mg Oral Early Morning Winter Mazariegos MD      polyethylene glycol  17 g Oral Daily PRN Martha Braden PA-C      prazosin  1 mg Oral HS Rudy BUCHANAN Prayson, DO      pregabalin  100 mg Oral TID Winter Mazariegos MD      QUEtiapine  200 mg Oral HS Jane Sales, DO      risperiDONE  0.25 mg Oral Q4H PRN Max 6/day Winter Mazariegos MD      risperiDONE  0.5 mg Oral Q4H PRN Max 3/day Winter Mazariegos MD      risperiDONE  1 mg Oral Q2H PRN Max 3/day Winter Mazariegos MD      [START ON 10/31/2023] sertraline  100 mg Oral Daily Anatoliy Brock PA-C      traMADol  50 mg Oral Q6H PRN Rodrick Christopher MD       Risks / Benefits of Treatment:    Risks, benefits, and possible side effects of medications explained to patient and patient verbalizes understanding and agreement for treatment. Counseling / Coordination of Care: Total floor / unit time spent today 20 minutes. Greater than 50% of total time was spent with the patient and / or family counseling and / or coordination of care. A description of counseling / coordination of care:  Patient's progress discussed with staff in treatment team meeting. Medications, treatment progress and treatment plan reviewed with patient.     Trev Myers PA-C 10/30/23

## 2023-10-30 NOTE — PLAN OF CARE
Problem: OCCUPATIONAL THERAPY ADULT  Goal: Performs self-care activities at highest level of function for planned discharge setting. See evaluation for individualized goals. Description: Treatment Interventions: ADL retraining, UE strengthening/ROM, Functional transfer training, Endurance training, Patient/family training, Energy conservation, Activityengagement          See flowsheet documentation for full assessment, interventions and recommendations. Outcome: Progressing  Note: Limitation: Decreased ADL status, Decreased UE strength, Decreased Safe judgement during ADL, Decreased endurance, Decreased high-level ADLs  Prognosis: Good  Assessment: Patient participated in Skilled OT session this date with interventions consisting of Energy Conservation techniques and therapeutic exercise to: increase functional use of BUEs, increase BUE muscle strength  . Patient agreeable to OT treatment session, upon arrival patient was found seated OOB to Chair, alert, responsive , and in no apparent distress. Patient tolerated UB TE as detailed in flow sheet. Following TE, patient declined further activity. Session ended with patient seated OOB to chair and all needs met. In comparison to previous session, patient with improvements in UB strength and endurance. Patient requiring verbal cues for correct technique, verbal cues for pacing thru activity steps, and frequent rest periods. Patient performance  demonstrated good carryover of learned techniques and strategies to facilitate safety during functional tasks. Patient continues to be functioning below baseline level, occupational performance remains limited secondary to factors listed above and increased risk for falls and injury. From OT standpoint, recommendation at time of d/c would be Home OT.   Patient to benefit from continued Occupational Therapy treatment while in the hospital to address deficits as defined above and maximize level of functional independence with ADLs and functional mobility in order to return to PLOF.      Rehab Resource Intensity Level, OT: III (Minimum Resource Intensity)

## 2023-10-30 NOTE — SOCIAL WORK
CM placed follow up call to PT daughter to review discharge planning. Left message requesting return call.  [phone]

## 2023-10-30 NOTE — PROGRESS NOTES
Progress Note - Samantha Lindsay 59 y.o. female MRN: [id number]    Unit/Bed#: [facility] Encounter: [id number]        Subjective:   Patient seen and examined at bedside after reviewing the chart and discussing the case with the caring staff. Patient examined at bedside. Patient had a fall yesterday in the shower without any injury. Physical Exam   Vitals: Blood pressure 136/67, pulse 71, temperature (!) 97.1 °F (36.2 °C), temperature source Temporal, resp. rate 18, height 5' 1" (1.549 m), weight 85 kg (187 lb 6.4 oz), SpO2 99 %, not currently breastfeeding. ,Body mass index is 35.41 kg/m². Constitutional: Patient in no acute distress. HEENT: PERR, EOMI, MMM. Cardiovascular: Normal rate and regular rhythm, no wheezing, no rhonchi, no crackles noted. Pulmonary/Chest: Effort normal and breath sounds normal.   Abdomen: Soft, + BS, NT. Assessment/Plan:  Samantha Lindsay is a(n) 59 y.o. female with panic disorder. 1. Cardiac with hx HTN, HLD. Continue amlodipine 10 mg daily, atorvastatin 40 mg daily, ASA 81 mg daily. 2. Tardive dyskinesia. Continue home valbenazine tosylate 40 mg daily. 3. Iron deficiency anemia. Patient is on ferrous sulfate 324 mg every other day. 4. DJD/OA/chronic back pain. Continue Lyrica 100 mg 3 times daily. Tylenol for mild/mod pain. Tramadol q6h prn for severe pain, Robaxin and Voltaren gel prn.  5. Gait abnormality. PT OT consulted for further evaluation. 6. GERD. Patient is on Protonix 40 mg daily. 7. Vitamin D deficiency. Patient started on vitamin D2 50,000 units weekly for 8 weeks followed by vitamin D3 1000 units daily. 8. Vitamin B12 deficiency. Patient started on vitamin B-12 monthly injections. 9. Weight gain. Dietitian to see patient. 10. Allergic rhinitis. Started on Claritin 10 mg daily 10/25/23. Flonase prn.

## 2023-10-31 PROCEDURE — 99232 SBSQ HOSP IP/OBS MODERATE 35: CPT | Performed by: PSYCHIATRY & NEUROLOGY

## 2023-10-31 RX ADMIN — PREGABALIN 100 MG: 100 CAPSULE ORAL at 08:11

## 2023-10-31 RX ADMIN — SERTRALINE 100 MG: 100 TABLET, FILM COATED ORAL at 08:12

## 2023-10-31 RX ADMIN — PREGABALIN 100 MG: 100 CAPSULE ORAL at 17:06

## 2023-10-31 RX ADMIN — PRAZOSIN HYDROCHLORIDE 1 MG: 1 CAPSULE ORAL at 21:22

## 2023-10-31 RX ADMIN — AMLODIPINE BESYLATE 10 MG: 10 TABLET ORAL at 08:11

## 2023-10-31 RX ADMIN — PREGABALIN 100 MG: 100 CAPSULE ORAL at 21:22

## 2023-10-31 RX ADMIN — ASPIRIN 81 MG: 81 TABLET, COATED ORAL at 08:11

## 2023-10-31 RX ADMIN — CLONAZEPAM 1 MG: 1 TABLET ORAL at 08:11

## 2023-10-31 RX ADMIN — LORATADINE 10 MG: 10 TABLET ORAL at 08:11

## 2023-10-31 RX ADMIN — CLONAZEPAM 1 MG: 1 TABLET ORAL at 21:22

## 2023-10-31 RX ADMIN — ATORVASTATIN CALCIUM 40 MG: 40 TABLET, FILM COATED ORAL at 17:06

## 2023-10-31 RX ADMIN — Medication 6 MG: at 21:22

## 2023-10-31 RX ADMIN — QUETIAPINE 200 MG: 50 TABLET, FILM COATED, EXTENDED RELEASE ORAL at 21:22

## 2023-10-31 RX ADMIN — HYDROXYZINE HYDROCHLORIDE 25 MG: 25 TABLET ORAL at 14:08

## 2023-10-31 RX ADMIN — ACETAMINOPHEN 975 MG: 325 TABLET ORAL at 19:53

## 2023-10-31 RX ADMIN — PANTOPRAZOLE SODIUM 40 MG: 40 TABLET, DELAYED RELEASE ORAL at 06:15

## 2023-10-31 NOTE — NURSING NOTE
Patient was observed to be visible in the community this evening; spending time with peers in the small dining area. She is quiet and mostly keeps to herself. She continues to endorse moderate anxiety and depression, denies SI, HI and hallucinations. Samantha was medication compliant at HS. Positive for snack and fluids tonight. Ambulates with a walker. Continuous safety rounding in progress. LBM 10/30/2023 per patient. Fall precautions maintained.

## 2023-10-31 NOTE — SOCIAL WORK
CM met with PT. PT was labile throughout conversation. CM reviewed attempts made to PT daughter without response. CM inquired if PT would allow  CM to speak with her son. PT declined and indicated that she does  not want children involved in her care and that she can not go to their home. CM provided reassurance and explained to PT that she was hopeful to be able to collaborate with her family for a safe discharge plan. PT declined. Reassurance provided.

## 2023-10-31 NOTE — PROGRESS NOTES
Progress Note - Behavioral Health     Samantha Szymanski 59 y.o. female MRN: [id number]   Unit/Bed#: [facility] 200-01 Encounter: [id number]    Behavior over the last 24 hours: unchanged. Samantha was seen in follow-up. Has been seen visible on the milieu but has been isolative to herself, often seen reading. Per staff, no acute events reported overnight. Reports ongoing moderate depression and anxiety symptoms. However has been accepting and tolerating medication without side effects. Samantha was seen in group room reading. She states that she is doing well without any acute complaints. Still continues to endorse ongoing anxiety but no overt episodes of panic. Does endorse fear of future panic attacks, but for the most part has been feeling more in control in hospital. She feels that her medications have been helpful. She remains preservative on housing options and is frustrated that she cannot immediately be discharged to her own apartment or [facility]. Per chart review, she does have extensive services by which [facility] was deemed not appropriate after referral in September of this year. She is refusing us at this time to get in contact with her family. Prior to this per , messages have been left however we have not been able to touch base with her daughter. Is irritable at times and frustrated during my discussion with her today regarding options. States that her daughter and son moved in together with her five grandchildren so this would not be ideal for her to return there. She denies SI/HI/AVH. Tolerating recent adjustment in zoloft.      Sleep: normal  Appetite: normal  Medication side effects: No   ROS: no complaints, all other systems are negative  VS reviewed and stable    Mental Status Evaluation:    Appearance:  age appropriate, casually dressed, dressed appropriately   Behavior:  pleasant, cooperative, calm, irritable edge at times   Speech:  dysarthric   Mood:  anxious and depressed   Affect:  labile   Thought Process:  goal directed   Associations: intact associations   Thought Content:  negative thoughts, ruminating thoughts, perseverative on finding her own place to live   Perceptual Disturbances: no auditory hallucinations, no visual hallucinations   Risk Potential: Suicidal ideation - None at present  Homicidal ideation - None at present  Potential for aggression - No   Sensorium:  oriented to person, place, and time/date   Memory:  recent and remote memory grossly intact   Consciousness:  alert and awake   Attention/Concentration: attention span and concentration are age appropriate   Insight:  limited   Judgment: limited   Gait/Station: uses walker   Motor Activity: abnormal movement noted: dyskinetic face movements present     Vital signs in last 24 hours:    Temp:  [97.5 °F (36.4 °C)-97.7 °F (36.5 °C)] 97.7 °F (36.5 °C)  HR:  [64-74] 64  Resp:  [16-18] 16  BP: (111-118)/(60-73) 111/60    Laboratory results: I have personally reviewed all pertinent laboratory/tests results    Results from the past 24 hours: No results found for this or any previous visit (from the past 24 hour(s)).   Most Recent Labs:   Lab Results   Component Value Date    WBC 3.39 (L) 10/19/2023    RBC 4.08 10/19/2023    HGB 12.9 10/19/2023    HCT 38.6 10/19/2023     10/19/2023    RDW 13.4 10/19/2023    NEUTROABS 1.17 (L) 10/19/2023    TOTANEUTABS 0.83 (L) 05/25/2023    SODIUM 139 10/19/2023    K 3.6 10/19/2023     10/19/2023    CO2 28 10/19/2023    BUN 11 10/19/2023    CREATININE 0.66 10/19/2023    GLUC 86 10/19/2023    CALCIUM 8.7 10/19/2023    AST 24 10/18/2023    ALT 27 10/18/2023    ALKPHOS 102 10/18/2023    TP 6.2 (L) 10/18/2023    ALB 4.3 10/18/2023    TBILI 0.76 10/18/2023    CHOLESTEROL 153 10/19/2023    HDL 63 10/19/2023    TRIG 70 10/19/2023    LDLCALC 76 10/19/2023    [address] 10/19/2023    LITHIUM <0.1 (L) 03/23/2023    AMMONIA <10 (L) 06/27/2021    [id number] 0.765 10/18/2023    FREET4 0.80 02/13/2020    PREGSERUM Negative 02/22/2014    HGBA1C 4.5 07/19/2023    EAG 82 07/19/2023       Progress Toward Goals: progressing    Assessment/Plan   Principal Problem:    Generalized anxiety disorder with panic attacks  Active Problems:    Post traumatic stress disorder    Depression      Recommended Treatment:     Planned medication and treatment changes: All current active medications have been reviewed  Encourage group therapy, milieu therapy and occupational therapy  Behavioral Health checks every 7 minutes    Continue current medications  DC planning ongoing - shelter vs. Placement with family; CM has reached out to family on various occasions with no response. Will continue to discuss options.     Current Facility-Administered Medications   Medication Dose Route Frequency Provider Last Rate    acetaminophen  650 mg Oral Q4H PRN Martha L Dagcieral, PA-C      acetaminophen  975 mg Oral Q6H PRN Martha L Sampson, PA-C      aluminum-magnesium hydroxide-simethicone  30 mL Oral Q4H PRN Jocelyne Thibodeaux MD      amLODIPine  10 mg Oral Daily Jocelyne Thibodeaux MD      aspirin  81 mg Oral Daily Martha Braden, JACQUI      atorvastatin  40 mg Oral Daily With Dinner Jocelyne Thibodeaux MD      [START ON 12/10/2023] cholecalciferol  1,000 Units Oral Daily Martha L Jigarl, PA-C      clonazePAM  1 mg Oral HS Rudy York DO      clonazePAM  1 mg Oral Daily Adrienne Brock PA-C      cyanocobalamin  1,000 mcg Intramuscular Q30 Days Martha Braden, PA-C      Diclofenac Sodium  2 g Topical 4x Daily PRN Martha Kimballl, PA-C      ergocalciferol  50,000 Units Oral Weekly Martha Braden, PA-C      ferrous sulfate  325 mg Oral Every Other Day Martha L Sampson, PA-C      fluticasone  2 spray Each Nare Daily PRN Matrhajessenia Braden, PA-C      haloperidol lactate  5 mg Intramuscular Q4H PRN Max 4/day Jocelyne Thibodeaux MD      hydrOXYzine HCL  25 mg Oral Q6H PRN Max 4/day Jocelyne Thibodeaux MD      influenza vaccine  0.5 mL Intramuscular Prior to discharge Jo Ann Sarabia MD      loratadine  10 mg Oral Daily Martha Braden PA-C      LORazepam  1 mg Intramuscular Q6H PRN Max 3/day Mirela Rossi MD      melatonin  6 mg Oral HS Lima Evan, DO      nicotine polacrilex  4 mg Oral Q2H PRN Mirela Rossi MD      pantoprazole  40 mg Oral Early Morning Mirela Rossi MD      polyethylene glycol  17 g Oral Daily PRN Martha Braden PA-C      prazosin  1 mg Oral HS Rudy A Prayson, DO      pregabalin  100 mg Oral TID Mirela Rossi MD      QUEtiapine  200 mg Oral HS Lima Evan, DO      risperiDONE  0.25 mg Oral Q4H PRN Max 6/day Mirela Rossi MD      risperiDONE  0.5 mg Oral Q4H PRN Max 3/day Mirela Rossi MD      risperiDONE  1 mg Oral Q2H PRN Max 3/day Mirela Rossi MD      sertraline  100 mg Oral Daily Bailey Lakshmi BrockJACQUI      traMADol  50 mg Oral Q6H PRN Jo Ann Sarabia MD       Risks / Benefits of Treatment:    Risks, benefits, and possible side effects of medications explained to patient and patient verbalizes understanding and agreement for treatment. Counseling / Coordination of Care: Total floor / unit time spent today 20 minutes. Greater than 50% of total time was spent with the patient and / or family counseling and / or coordination of care. A description of counseling / coordination of care:  Patient's progress discussed with staff in treatment team meeting. Medications, treatment progress and treatment plan reviewed with patient.     Vladimir Evans PA-C 10/31/23

## 2023-10-31 NOTE — PROGRESS NOTES
10/31/23 4592   Team Meeting   Meeting Type Daily Rounds   Team Members Present   Team Members Present Physician;Nurse;;Occupational Therapist   Physician Team Member Dr. Jacqui Cates MD; SHAKILA Feng   Nursing Team Member Terence Douglas, RN   Care Management Team Member MS Francois, [facility], [facility]   OT Team Member Donna Arroyo, [place]   Patient/Family Present   Patient Present No   Patient's Family Present No   Moderate anxiety and depression, denies si/hi/ah/vh, ready for discharge, disposition tbd. Medication compliant.

## 2023-10-31 NOTE — PLAN OF CARE
Problem: PAIN - ADULT  Goal: Verbalizes/displays adequate comfort level or baseline comfort level  Description: Interventions:  - Encourage patient to monitor pain and request assistance  - Assess pain using appropriate pain scale  - Administer analgesics based on type and severity of pain and evaluate response  - Implement non-pharmacological measures as appropriate and evaluate response  - Consider cultural and social influences on pain and pain management  - Notify physician/advanced practitioner if interventions unsuccessful or patient reports new pain  Outcome: Progressing     Problem: SAFETY ADULT  Goal: Patient will remain free of falls  Description: INTERVENTIONS:  - Educate patient/family on patient safety including physical limitations  - Instruct patient to call for assistance with activity   - Consult OT/PT to assist with strengthening/mobility   - Keep Call bell within reach  - Keep bed low and locked with side rails adjusted as appropriate  - Keep care items and personal belongings within reach  - Initiate and maintain comfort rounds  - Make Fall Risk Sign visible to staff  - Obtain necessary fall risk management equipment: walker  - Apply yellow socks and bracelet for high fall risk patients  - Consider moving patient to room near nurses station  Outcome: Progressing     Problem: DISCHARGE PLANNING  Goal: Discharge to home or other facility with appropriate resources  Description: INTERVENTIONS:  - Identify barriers to discharge w/patient and caregiver  - Arrange for needed discharge resources and transportation as appropriate  - Identify discharge learning needs (meds, wound care, etc.)  - Arrange for interpretive services to assist at discharge as needed  - Refer to Case Management Department for coordinating discharge planning if the patient needs post-hospital services based on physician/advanced practitioner order or complex needs related to functional status, cognitive ability, or social support system  Outcome: Progressing     Problem: Ineffective Coping  Goal: Demonstrates healthy coping skills  Outcome: Progressing  Goal: Participates in unit activities  Description: Interventions:  - Provide therapeutic environment   - Provide required programming   - Redirect inappropriate behaviors   Outcome: Progressing  Goal: Patient/Family participate in treatment and DC plans  Description: Interventions:  - Provide therapeutic environment  Outcome: Progressing  Goal: Patient/Family verbalizes awareness of resources  Outcome: Progressing  Goal: Understands least restrictive measures  Description: Interventions:  - Utilize least restrictive behavior  Outcome: Progressing  Goal: Free from restraint events  Description: - Utilize least restrictive measures   - Provide behavioral interventions   - Redirect inappropriate behaviors   Outcome: Progressing     Problem: Depression  Goal: Treatment Goal: Demonstrate behavioral control of depressive symptoms, verbalize feelings of improved mood/affect, and adopt new coping skills prior to discharge  Outcome: Progressing  Goal: Verbalize thoughts and feelings  Description: Interventions:  - Assess and re-assess patient's level of risk   - Engage patient in 1:1 interactions, daily, for a minimum of 15 minutes   - Encourage patient to express feelings, fears, frustrations, hopes   Outcome: Progressing  Goal: Refrain from harming self  Description: Interventions:  - Monitor patient closely, per order   - Supervise medication ingestion, monitor effects and side effects   Outcome: Progressing  Goal: Refrain from isolation  Description: Interventions:  - Develop a trusting relationship   - Encourage socialization   Outcome: Progressing  Goal: Refrain from self-neglect  Outcome: Progressing  Goal: Complete daily ADLs, including personal hygiene independently, as able  Description: Interventions:  - Observe, teach, and assist patient with ADLS  -  Monitor and promote a balance of rest/activity, with adequate nutrition and elimination   Outcome: Progressing     Problem: Anxiety  Goal: Anxiety is at manageable level  Description: Interventions:  - Assess and monitor patient's anxiety level. - Monitor for signs and symptoms (heart palpitations, chest pain, shortness of breath, headaches, nausea, feeling jumpy, restlessness, irritable, apprehensive). - Collaborate with interdisciplinary team and initiate plan and interventions as ordered. - Hydes patient to unit/surroundings  - Explain treatment plan  - Encourage participation in care  - Encourage verbalization of concerns/fears  - Identify coping mechanisms  - Assist in developing anxiety-reducing skills  - Administer/offer alternative therapies  - Limit or eliminate stimulants  Outcome: Progressing     Problem: Nutrition/Hydration-ADULT  Goal: Nutrient/Hydration intake appropriate for improving, restoring or maintaining nutritional needs  Description: Monitor and assess patient's nutrition/hydration status for malnutrition. Collaborate with interdisciplinary team and initiate plan and interventions as ordered. Monitor patient's weight and dietary intake as ordered or per policy. Utilize nutrition screening tool and intervene as necessary. Determine patient's food preferences and provide high-protein, high-caloric foods as appropriate.      INTERVENTIONS:  - Monitor oral intake, urinary output, labs, and treatment plans  - Assess nutrition and hydration status and recommend course of action  - Evaluate amount of meals eaten  - Assist patient with eating if necessary   - Allow adequate time for meals  - Recommend/ encourage appropriate diets, oral nutritional supplements, and vitamin/mineral supplements  - Order, calculate, and assess calorie counts as needed  - Recommend, monitor, and adjust tube feedings and TPN/PPN based on assessed needs  - Assess need for intravenous fluids  - Provide specific nutrition/hydration education as appropriate  - Include patient/family/caregiver in decisions related to nutrition  Outcome: Progressing     Problem: Prexisting or High Potential for Compromised Skin Integrity  Goal: Skin integrity is maintained or improved  Description: INTERVENTIONS:  - Identify patients at risk for skin breakdown  - Assess and monitor skin integrity  - Assess and monitor nutrition and hydration status  - Monitor labs   - Assess for incontinence   - Turn and reposition patient  - Assist with mobility/ambulation  - Relieve pressure over bony prominences  - Avoid friction and shearing  - Provide appropriate hygiene as needed including keeping skin clean and dry  - Evaluate need for skin moisturizer/barrier cream  - Collaborate with interdisciplinary team   - Patient/family teaching  - Consider wound care consult   Outcome: Progressing

## 2023-10-31 NOTE — NURSING NOTE
Pt was medicated for mild anxiety with PRN atarax 25 mg. Pt was observed sleeping in her room. No distress noted.

## 2023-10-31 NOTE — PROGRESS NOTES
Progress Note - Samantha Bustamante 59 y.o. female MRN: [id number]    Unit/Bed#: [facility] Encounter: [id number]        Subjective:   Patient seen and examined at bedside after reviewing the chart and discussing the case with the caring staff. Patient examined at bedside. Patient reports no acute issues. Physical Exam   Vitals: Blood pressure 111/60, pulse 64, temperature 97.7 °F (36.5 °C), temperature source Temporal, resp. rate 16, height 5' 1" (1.549 m), weight 85 kg (187 lb 6.4 oz), SpO2 100 %, not currently breastfeeding. ,Body mass index is 35.41 kg/m². Constitutional: Patient in no acute distress. HEENT: PERR, EOMI, MMM. Cardiovascular: Normal rate and regular rhythm, no wheezing, no rhonchi, no crackles noted. Pulmonary/Chest: Effort normal and breath sounds normal.   Abdomen: Soft, + BS, NT. Assessment/Plan:  Samantha Bustamante is a(n) 59 y.o. female with panic disorder. 1. Cardiac with hx HTN, HLD. Continue amlodipine 10 mg daily, atorvastatin 40 mg daily, ASA 81 mg daily. 2. Tardive dyskinesia. Continue home valbenazine tosylate 40 mg daily. 3. Iron deficiency anemia. Patient is on ferrous sulfate 324 mg every other day. 4. DJD/OA/chronic back pain. Continue Lyrica 100 mg 3 times daily. Tylenol for mild/mod pain. Tramadol q6h prn for severe pain, Robaxin and Voltaren gel prn.  5. Gait abnormality. PT OT consulted for further evaluation. 6. GERD. Patient is on Protonix 40 mg daily. 7. Vitamin D deficiency. Patient started on vitamin D2 50,000 units weekly for 8 weeks followed by vitamin D3 1000 units daily. 8. Vitamin B12 deficiency. Patient started on vitamin B-12 monthly injections. 9. Weight gain. Dietitian to see patient. 10. Allergic rhinitis. Started on Claritin 10 mg daily 10/25/23. Flonase prn.

## 2023-10-31 NOTE — NURSING NOTE
Patient visible in milieu, pleasant and cooperative in interaction. Mostly keeps to self, likes to read. Social with staff and select peers. Patient described anxiety as "quite a bit" due to discharge disposition. Endorsed depression however unable to identify cause. Denies SI/HI, hallucinations. Remains medication compliant and on 7" checks for safety and behaviors.

## 2023-10-31 NOTE — PHYSICAL THERAPY NOTE
10/31/23 1125   PT Last Visit   PT Visit Date 10/31/23   Note Type   Note Type Cancelled Session   Cancel Reasons Other  (asleep)                                          Physical Therapy Cancellation Note       PT treatment attempted this morning but upon entering room patient was sleeping. Despite calling name loudly patient did not arouse. Will continue to offer PT treatment as ordered and progress as able when patient is alert enough to participate.         Hanny Sprague [place]

## 2023-10-31 NOTE — OCCUPATIONAL THERAPY NOTE
10/31/23 1055   Note Type   Note Type Cancelled Session   Cancel Reasons Other  (unarousable)       Attempted OT treatment today ay 1055. Patient unarousable at this time. Will continue with POC as able.   Korina Scott

## 2023-10-31 NOTE — NURSING NOTE
Pt reported to nursing that she was having a " panic attack." Pt was breathing heavily and reported, " racing heart." Pt was medicated with PRN PO atarax 25 mg for mild anxiety. Will monitor for effectiveness.

## 2023-11-01 PROCEDURE — 99232 SBSQ HOSP IP/OBS MODERATE 35: CPT | Performed by: PSYCHIATRY & NEUROLOGY

## 2023-11-01 RX ADMIN — PREGABALIN 100 MG: 100 CAPSULE ORAL at 08:09

## 2023-11-01 RX ADMIN — AMLODIPINE BESYLATE 10 MG: 10 TABLET ORAL at 08:08

## 2023-11-01 RX ADMIN — PREGABALIN 100 MG: 100 CAPSULE ORAL at 21:42

## 2023-11-01 RX ADMIN — ASPIRIN 81 MG: 81 TABLET, COATED ORAL at 08:09

## 2023-11-01 RX ADMIN — Medication 6 MG: at 21:42

## 2023-11-01 RX ADMIN — FERROUS SULFATE TAB 325 MG (65 MG ELEMENTAL FE) 325 MG: 325 (65 FE) TAB at 08:08

## 2023-11-01 RX ADMIN — ATORVASTATIN CALCIUM 40 MG: 40 TABLET, FILM COATED ORAL at 17:02

## 2023-11-01 RX ADMIN — TRAMADOL HYDROCHLORIDE 50 MG: 50 TABLET, COATED ORAL at 13:46

## 2023-11-01 RX ADMIN — HYDROXYZINE HYDROCHLORIDE 25 MG: 25 TABLET ORAL at 13:46

## 2023-11-01 RX ADMIN — QUETIAPINE 200 MG: 50 TABLET, FILM COATED, EXTENDED RELEASE ORAL at 21:41

## 2023-11-01 RX ADMIN — CLONAZEPAM 1 MG: 1 TABLET ORAL at 08:08

## 2023-11-01 RX ADMIN — SERTRALINE 100 MG: 100 TABLET, FILM COATED ORAL at 08:08

## 2023-11-01 RX ADMIN — PREGABALIN 100 MG: 100 CAPSULE ORAL at 17:02

## 2023-11-01 RX ADMIN — PANTOPRAZOLE SODIUM 40 MG: 40 TABLET, DELAYED RELEASE ORAL at 05:57

## 2023-11-01 RX ADMIN — LORATADINE 10 MG: 10 TABLET ORAL at 08:09

## 2023-11-01 RX ADMIN — CLONAZEPAM 1 MG: 1 TABLET ORAL at 21:41

## 2023-11-01 NOTE — NURSING NOTE
Patient appears to have slept through the overnight hours without issue. No complaints voiced. No acute behaviors observed. Samantha remains in bed sleeping at this time. Continuous safety rounding in progress.

## 2023-11-01 NOTE — PROGRESS NOTES
Progress Note - Behavioral Health     Samantha Bettencourt 59 y.o. female MRN: [id number]   Unit/Bed#: [facility] 200-01 Encounter: [id number]    Behavior over the last 24 hours: unchanged. Samantha was seen in follow-up. Per staff, patient approached her yesterday afternoon and has a panic attack. She was complaining of symptoms such as racing heart and a headache. She was medicated with hydroxyzine at 1408 which was effective. Otherwise has been accepting medications and tolerating. Is still complaining of ongoing depression and anxiety symptoms. Denies SI/HI/AVH. On presentation she is dressed in regular attire waiting to use phones. She is still very anxious and ruminating about her discharge panning. She is now in agreement for team to contact her family to discuss options. She is preoccupied with the future, focusing on negatives and future panic attacks. States she has been a burden to her family, risked her daughter being kicked out of her home since she has been calling 911 so frequently. She still endorsing periods of panic but her coping skills remain poor.          Sleep: normal  Appetite: normal  Medication side effects: No   ROS: no complaints, all other systems are negative  VS reviewed and stable    Mental Status Evaluation:    Appearance:  age appropriate, casually dressed, dressed appropriately   Behavior:  pleasant, cooperative, calm   Speech:  dysarthric   Mood:  anxious, depressed   Affect:  constricted   Thought Process:  goal directed   Associations: intact associations   Thought Content:  negative thoughts, ruminating thoughts   Perceptual Disturbances: no auditory hallucinations, no visual hallucinations   Risk Potential: Suicidal ideation - None at present  Homicidal ideation - None at present  Potential for aggression - No   Sensorium:  oriented to person, place, and time/date   Memory:  recent and remote memory grossly intact   Consciousness:  alert and awake   Attention/Concentration: attention span and concentration are age appropriate   Insight:  limited   Judgment: limited   Gait/Station: uses walker   Motor Activity: abnormal movement noted: dyskinetic face movements present     Vital signs in last 24 hours:    Temp:  [97.5 °F (36.4 °C)-98 °F (36.7 °C)] 98 °F (36.7 °C)  HR:  [69-71] 71  Resp:  [18] 18  BP: (100-136)/(59-88) 136/88    Laboratory results: I have personally reviewed all pertinent laboratory/tests results    Results from the past 24 hours: No results found for this or any previous visit (from the past 24 hour(s)). Most Recent Labs:   Lab Results   Component Value Date    WBC 3.39 (L) 10/19/2023    RBC 4.08 10/19/2023    HGB 12.9 10/19/2023    HCT 38.6 10/19/2023     10/19/2023    RDW 13.4 10/19/2023    NEUTROABS 1.17 (L) 10/19/2023    TOTANEUTABS 0.83 (L) 05/25/2023    SODIUM 139 10/19/2023    K 3.6 10/19/2023     10/19/2023    CO2 28 10/19/2023    BUN 11 10/19/2023    CREATININE 0.66 10/19/2023    GLUC 86 10/19/2023    CALCIUM 8.7 10/19/2023    AST 24 10/18/2023    ALT 27 10/18/2023    ALKPHOS 102 10/18/2023    TP 6.2 (L) 10/18/2023    ALB 4.3 10/18/2023    TBILI 0.76 10/18/2023    CHOLESTEROL 153 10/19/2023    HDL 63 10/19/2023    TRIG 70 10/19/2023    LDLCALC 76 10/19/2023    [address] 90 10/19/2023    LITHIUM <0.1 (L) 03/23/2023    AMMONIA <10 (L) 06/27/2021    XKX6ZOSXNQDB 0.765 10/18/2023    FREET4 0.80 02/13/2020    PREGSERUM Negative 02/22/2014    HGBA1C 4.5 07/19/2023    EAG 82 07/19/2023       Progress Toward Goals: progressing    Assessment/Plan   Principal Problem:    Generalized anxiety disorder with panic attacks  Active Problems:    Post traumatic stress disorder    Depression      Recommended Treatment:     Planned medication and treatment changes:     All current active medications have been reviewed  Encourage group therapy, milieu therapy and occupational therapy  Behavioral Health checks every 7 minutes    Continue current medications and therapy  Consider increase in Zoloft to 125 mg this upcoming week for ongoing symptoms  DC planning ongoing - CM to reach out to daughter to consider placement options.        Current Facility-Administered Medications   Medication Dose Route Frequency Provider Last Rate    acetaminophen  650 mg Oral Q4H PRN Martha Braden PA-C      acetaminophen  975 mg Oral Q6H PRN SHAKILA Wren-C      aluminum-magnesium hydroxide-simethicone  30 mL Oral Q4H PRN Roxy Jade MD      amLODIPine  10 mg Oral Daily Roxy Jade MD      aspirin  81 mg Oral Daily Martha Braden PA-C      atorvastatin  40 mg Oral Daily With Dinner Roxy Jade MD      [START ON 12/10/2023] cholecalciferol  1,000 Units Oral Daily Martha Braden PA-C      clonazePAM  1 mg Oral HS Rudy A Prayson, DO      clonazePAM  1 mg Oral Daily Isabelle Brock PA-C      cyanocobalamin  1,000 mcg Intramuscular Q30 Days Martha Braedn PA-C      Diclofenac Sodium  2 g Topical 4x Daily PRN Martha Braden PA-C      ergocalciferol  50,000 Units Oral Weekly Martha Braden PA-C      ferrous sulfate  325 mg Oral Every Other Day Martha Braden PA-C      fluticasone  2 spray Each Nare Daily PRN SHAKILA Wren-DAMIAN      haloperidol lactate  5 mg Intramuscular Q4H PRN Max 4/day Roxy Jade MD      hydrOXYzine HCL  25 mg Oral Q6H PRN Max 4/day Roxy Jade MD      influenza vaccine  0.5 mL Intramuscular Prior to discharge Aubrey Brewster MD      loratadine  10 mg Oral Daily Martha Braden PA-C      LORazepam  1 mg Intramuscular Q6H PRN Max 3/day Roxy Jade MD      melatonin  6 mg Oral HS Rudy A Prayson, DO      nicotine polacrilex  4 mg Oral Q2H PRN Roxy Jade MD      pantoprazole  40 mg Oral Early Morning Roxy Jade MD      polyethylene glycol  17 g Oral Daily PRN Martha Braden PA-C      prazosin  1 mg Oral HS Rudy A Prayson, DO      pregabalin  100 mg Oral TID Roxy Jade MD      QUEtiapine  200 mg Oral HS Ely Hill DO      risperiDONE  0.25 mg Oral Q4H PRN Max 6/day Maira Kahn MD      risperiDONE  0.5 mg Oral Q4H PRN Max 3/day Maira Kahn MD      risperiDONE  1 mg Oral Q2H PRN Max 3/day Maira Kahn MD      sertraline  100 mg Oral Daily Kip Ferrari PA-C      traMADol  50 mg Oral Q6H PRN Arpit Lugo MD       Risks / Benefits of Treatment:    Risks, benefits, and possible side effects of medications explained to patient and patient verbalizes understanding and agreement for treatment. Counseling / Coordination of Care: Total floor / unit time spent today 20 minutes. Greater than 50% of total time was spent with the patient and / or family counseling and / or coordination of care. A description of counseling / coordination of care:  Patient's progress discussed with staff in treatment team meeting. Medications, treatment progress and treatment plan reviewed with patient.     Mukul Escalona PA-C 11/01/23

## 2023-11-01 NOTE — NURSING NOTE
Patient appears to have less anxiety as she is quietly resting in bed, no noted distress. Remains on 7" checks for safety and behaviors.

## 2023-11-01 NOTE — PROGRESS NOTES
Progress Note - Samantha Kirkland 59 y.o. female MRN: [id number]    Unit/Bed#: [facility] Encounter: [id number]        Subjective:   Patient seen and examined at bedside after reviewing the chart and discussing the case with the caring staff. Patient examined at bedside. Patient reports no acute issues. Physical Exam   Vitals: Blood pressure 136/88, pulse 71, temperature 98 °F (36.7 °C), temperature source Temporal, resp. rate 18, height 5' 1" (1.549 m), weight 85 kg (187 lb 6.4 oz), SpO2 97 %, not currently breastfeeding. ,Body mass index is 35.41 kg/m². Constitutional: Patient in no acute distress. HEENT: PERR, EOMI, MMM. Cardiovascular: Normal rate and regular rhythm, no wheezing, no rhonchi, no crackles noted. Pulmonary/Chest: Effort normal and breath sounds normal.   Abdomen: Soft, + BS, NT. Assessment/Plan:  Samantha Kirkland is a(n) 59 y.o. female with panic disorder. 1. Cardiac with hx HTN, HLD. Continue amlodipine 10 mg daily, atorvastatin 40 mg daily, ASA 81 mg daily. 2. Tardive dyskinesia. Continue home valbenazine tosylate 40 mg daily. 3. Iron deficiency anemia. Patient is on ferrous sulfate 324 mg every other day. 4. DJD/OA/chronic back pain. Continue Lyrica 100 mg 3 times daily. Tylenol for mild/mod pain. Tramadol q6h prn for severe pain, Robaxin and Voltaren gel prn.  5. Gait abnormality. PT OT consulted for further evaluation. 6. GERD. Patient is on Protonix 40 mg daily. 7. Vitamin D deficiency. Patient started on vitamin D2 50,000 units weekly for 8 weeks followed by vitamin D3 1000 units daily. 8. Vitamin B12 deficiency. Patient started on vitamin B-12 monthly injections. 9. Weight gain. Dietitian to see patient. 10. Allergic rhinitis. Started on Claritin 10 mg daily 10/25/23. Flonase prn.

## 2023-11-01 NOTE — PROGRESS NOTES
11/01/23    Team Meeting   Meeting Type Daily Rounds   Team Members Present   Team Members Present Physician;Nurse;;Occupational Therapist   Physician Team Member Dr. Laura Green MD; SHAKILA Neves   Nursing Team Member Rosemary Escobar, RN   Care Management Team Member Lexii Rosenthal MS, [facility] [place], [facility]   OT Team Member Silvano Johnson [place]   Patient/Family Present   Patient Present No   Patient's Family Present No   Will return home with waiver services. CM to follow up with family. [facility] outpatient psych with [facility]. Ready for discharge pending family/care taker collaboration. Endorsees anxiety and depression, blunted, withdrawn to room. Comes to some groups. Will encourage participation in groups.

## 2023-11-01 NOTE — NURSING NOTE
On reassessment of PRN Tylenol effectiveness, patient informs her headache pain was reduced to 5/10; PRN was helpful. No further complaints of pain voiced by patient.

## 2023-11-01 NOTE — PLAN OF CARE
Problem: PAIN - ADULT  Goal: Verbalizes/displays adequate comfort level or baseline comfort level  Description: Interventions:  - Encourage patient to monitor pain and request assistance  - Assess pain using appropriate pain scale  - Administer analgesics based on type and severity of pain and evaluate response  - Implement non-pharmacological measures as appropriate and evaluate response  - Consider cultural and social influences on pain and pain management  - Notify physician/advanced practitioner if interventions unsuccessful or patient reports new pain  Outcome: Progressing     Problem: SAFETY ADULT  Goal: Patient will remain free of falls  Description: INTERVENTIONS:  - Educate patient/family on patient safety including physical limitations  - Instruct patient to call for assistance with activity   - Consult OT/PT to assist with strengthening/mobility   - Keep Call bell within reach  - Keep bed low and locked with side rails adjusted as appropriate  - Keep care items and personal belongings within reach  - Initiate and maintain comfort rounds  - Make Fall Risk Sign visible to staff  - Apply yellow socks and bracelet for high fall risk patients  - Consider moving patient to room near nurses station  Outcome: Progressing     Problem: DISCHARGE PLANNING  Goal: Discharge to home or other facility with appropriate resources  Description: INTERVENTIONS:  - Identify barriers to discharge w/patient and caregiver  - Arrange for needed discharge resources and transportation as appropriate  - Identify discharge learning needs (meds, wound care, etc.)  - Arrange for interpretive services to assist at discharge as needed  - Refer to Case Management Department for coordinating discharge planning if the patient needs post-hospital services based on physician/advanced practitioner order or complex needs related to functional status, cognitive ability, or social support system  Outcome: Progressing     Problem: Ineffective Coping  Goal: Identifies ineffective coping skills  Outcome: Progressing  Goal: Identifies healthy coping skills  Outcome: Progressing  Goal: Demonstrates healthy coping skills  Outcome: Progressing  Goal: Participates in unit activities  Description: Interventions:  - Provide therapeutic environment   - Provide required programming   - Redirect inappropriate behaviors   Outcome: Progressing  Goal: Patient/Family participate in treatment and DC plans  Description: Interventions:  - Provide therapeutic environment  Outcome: Progressing  Goal: Patient/Family verbalizes awareness of resources  Outcome: Progressing  Goal: Understands least restrictive measures  Description: Interventions:  - Utilize least restrictive behavior  Outcome: Progressing  Goal: Free from restraint events  Description: - Utilize least restrictive measures   - Provide behavioral interventions   - Redirect inappropriate behaviors   Outcome: Progressing     Problem: Depression  Goal: Treatment Goal: Demonstrate behavioral control of depressive symptoms, verbalize feelings of improved mood/affect, and adopt new coping skills prior to discharge  Outcome: Progressing  Goal: Verbalize thoughts and feelings  Description: Interventions:  - Assess and re-assess patient's level of risk   - Engage patient in 1:1 interactions, daily, for a minimum of 15 minutes   - Encourage patient to express feelings, fears, frustrations, hopes   Outcome: Progressing  Goal: Refrain from harming self  Description: Interventions:  - Monitor patient closely, per order   - Supervise medication ingestion, monitor effects and side effects   Outcome: Progressing  Goal: Refrain from isolation  Description: Interventions:  - Develop a trusting relationship   - Encourage socialization   Outcome: Progressing  Goal: Refrain from self-neglect  Outcome: Progressing  Goal: Attend and participate in unit activities, including therapeutic, recreational, and educational groups  Description: Interventions:  - Provide therapeutic and educational activities daily, encourage attendance and participation, and document same in the medical record   Outcome: Progressing  Goal: Complete daily ADLs, including personal hygiene independently, as able  Description: Interventions:  - Observe, teach, and assist patient with ADLS  -  Monitor and promote a balance of rest/activity, with adequate nutrition and elimination   Outcome: Progressing     Problem: Anxiety  Goal: Anxiety is at manageable level  Description: Interventions:  - Assess and monitor patient's anxiety level. - Monitor for signs and symptoms (heart palpitations, chest pain, shortness of breath, headaches, nausea, feeling jumpy, restlessness, irritable, apprehensive). - Collaborate with interdisciplinary team and initiate plan and interventions as ordered. - [place] patient to unit/surroundings  - Explain treatment plan  - Encourage participation in care  - Encourage verbalization of concerns/fears  - Identify coping mechanisms  - Assist in developing anxiety-reducing skills  - Administer/offer alternative therapies  - Limit or eliminate stimulants  Outcome: Progressing     Problem: Nutrition/Hydration-ADULT  Goal: Nutrient/Hydration intake appropriate for improving, restoring or maintaining nutritional needs  Description: Monitor and assess patient's nutrition/hydration status for malnutrition. Collaborate with interdisciplinary team and initiate plan and interventions as ordered. Monitor patient's weight and dietary intake as ordered or per policy. Utilize nutrition screening tool and intervene as necessary. Determine patient's food preferences and provide high-protein, high-caloric foods as appropriate.      INTERVENTIONS:  - Monitor oral intake, urinary output, labs, and treatment plans  - Assess nutrition and hydration status and recommend course of action  - Evaluate amount of meals eaten  - Assist patient with eating if necessary   - Allow adequate time for meals  - Recommend/ encourage appropriate diets, oral nutritional supplements, and vitamin/mineral supplements  - Order, calculate, and assess calorie counts as needed  - Recommend, monitor, and adjust tube feedings and TPN/PPN based on assessed needs  - Assess need for intravenous fluids  - Provide specific nutrition/hydration education as appropriate  - Include patient/family/caregiver in decisions related to nutrition  Outcome: Progressing     Problem: Prexisting or High Potential for Compromised Skin Integrity  Goal: Skin integrity is maintained or improved  Description: INTERVENTIONS:  - Identify patients at risk for skin breakdown  - Assess and monitor skin integrity  - Assess and monitor nutrition and hydration status  - Monitor labs   - Assess for incontinence   - Turn and reposition patient  - Assist with mobility/ambulation  - Relieve pressure over bony prominences  - Avoid friction and shearing  - Provide appropriate hygiene as needed including keeping skin clean and dry  - Evaluate need for skin moisturizer/barrier cream  - Collaborate with interdisciplinary team   - Patient/family teaching  - Consider wound care consult   Outcome: Progressing

## 2023-11-01 NOTE — NURSING NOTE
Patient with mild anxiety over the possibility of having to go to shelter after discharge, requesting PRN for anxiety. Support provided. PRN Atarax given as ordered for Allen score of 16. Patient currently resting in bed. Remains on 7" checks for safety and behaviors.

## 2023-11-01 NOTE — SOCIAL WORK
CM placed call to PT daughter Adan Ross  for family check in. Updated her on PT status and plan of care. Reviewed that PT is stable for discharge, pending discharge planning. PT daughter reported that PT was staying with her and then in October PT daughter and her family as well as PT all moved in with PT son and his child in a one bedroom apartment. PT daughter reported that they are in the process of moving/building but will not be ready to end of November tentatively. PT daughter spoke with PT son while on phone with CM and confirmed with PT son that PT is not able to return to his apartment due to her ongoing behaviors and not being on the lease that he can not risk losing his apartment due to this. PT daughter indicated that she has been speaking with PT and that PT has been telling her that she wants to go to a shelter. CM reviewed that at this time if PT does not change her mind about alt options which she has declined PT will go to shelter. PT daughter reflected she understood and that she and her brother understand that PT may go to shelter, that this is PT request per their conversations and are ok with this at this time. PT daughter confirmed that home health team was [facility] that was providing home health care services for 5 hours a week, but reported that PT was independent with cares. Adan Ross is unsure of PT supports coordinator information but reported that [facility] would have this information. Much reassurance provided. Call ended mutually. CM placed call to [facility] and spoke with Rakesh Lovelace and updated on PT status and being ready for discharge but not being able to return to the family home. Shayna Montague confirmed PT is approved for 5 hours a week but really has not been receiving due to being back and forth to the hospital. Shayna Montague is unsure of supports coordinator information but will call CM back by end of day with name and contact information.  They do not have a working fax, they requested discharge summary be mailed. Call ended mutually.  [phone]

## 2023-11-01 NOTE — NURSING NOTE
Patient requested and received PRN Tylenol for c/o headache with a rating of 6/10. Administered PRN Tylenol 975 mg as per order for moderate pain. Will monitor for medication effectiveness.

## 2023-11-01 NOTE — NURSING NOTE
Patient visible in milieu, pleasant and cooperative in interaction. Offers minimal socialization with peers, social with staff. Affect blunted. Patient endorses mild anxiety/depression stating she is "feeling better". Denies SI/HI, hallucinations. Remains medications compliant and on 7" checks for safety and behaviors.

## 2023-11-01 NOTE — SOCIAL WORK
HUNTER spoke with [facility]  with [facility] [phone]. Melissa indicated that PT is approved for 25.76 hours a week. Reviewed PT status and plan of care. Reviewed that PT is ready for discharge but that she can not return to the family home and that PT son has resigned for being care taker with [facility]. Melissa indicated that she was not aware of any of this information. Reviewed that at this time PT is homeless and shelter is current dispo. as PT is declining [address] . Melissa will review this with her supervisor and follow up with CM on the outcome. Call ended mutually.

## 2023-11-01 NOTE — NURSING NOTE
Patient was observed to be visible this evening; spending time in the dining area with peers. No acute behaviors observed. She is calm and cooperative. She informs she feels tired today. Endorses ongoing anxiety and depression, denies SI, HI and hallucinations. Samantha was medication compliant at HS. Positive for snack and fluids tonight. Ambulates with a walker. Fall precautions maintained. Continuous safety rounding in progress.

## 2023-11-02 PROCEDURE — 97110 THERAPEUTIC EXERCISES: CPT

## 2023-11-02 PROCEDURE — 99232 SBSQ HOSP IP/OBS MODERATE 35: CPT | Performed by: PSYCHIATRY & NEUROLOGY

## 2023-11-02 PROCEDURE — 97116 GAIT TRAINING THERAPY: CPT

## 2023-11-02 PROCEDURE — 97112 NEUROMUSCULAR REEDUCATION: CPT

## 2023-11-02 RX ADMIN — ASPIRIN 81 MG: 81 TABLET, COATED ORAL at 08:38

## 2023-11-02 RX ADMIN — CLONAZEPAM 1 MG: 1 TABLET ORAL at 20:48

## 2023-11-02 RX ADMIN — PREGABALIN 100 MG: 100 CAPSULE ORAL at 08:38

## 2023-11-02 RX ADMIN — LORATADINE 10 MG: 10 TABLET ORAL at 08:38

## 2023-11-02 RX ADMIN — SERTRALINE 100 MG: 100 TABLET, FILM COATED ORAL at 08:38

## 2023-11-02 RX ADMIN — ATORVASTATIN CALCIUM 40 MG: 40 TABLET, FILM COATED ORAL at 15:49

## 2023-11-02 RX ADMIN — HYDROXYZINE HYDROCHLORIDE 25 MG: 25 TABLET ORAL at 14:12

## 2023-11-02 RX ADMIN — CLONAZEPAM 1 MG: 1 TABLET ORAL at 08:38

## 2023-11-02 RX ADMIN — PREGABALIN 100 MG: 100 CAPSULE ORAL at 15:49

## 2023-11-02 RX ADMIN — PRAZOSIN HYDROCHLORIDE 1 MG: 1 CAPSULE ORAL at 20:47

## 2023-11-02 RX ADMIN — TRAMADOL HYDROCHLORIDE 50 MG: 50 TABLET, COATED ORAL at 23:28

## 2023-11-02 RX ADMIN — PREGABALIN 100 MG: 100 CAPSULE ORAL at 20:46

## 2023-11-02 RX ADMIN — Medication 6 MG: at 20:47

## 2023-11-02 RX ADMIN — ACETAMINOPHEN 975 MG: 325 TABLET ORAL at 14:11

## 2023-11-02 RX ADMIN — PANTOPRAZOLE SODIUM 40 MG: 40 TABLET, DELAYED RELEASE ORAL at 06:04

## 2023-11-02 RX ADMIN — QUETIAPINE 200 MG: 50 TABLET, FILM COATED, EXTENDED RELEASE ORAL at 20:46

## 2023-11-02 RX ADMIN — AMLODIPINE BESYLATE 10 MG: 10 TABLET ORAL at 08:37

## 2023-11-02 NOTE — PLAN OF CARE
Problem: PAIN - ADULT  Goal: Verbalizes/displays adequate comfort level or baseline comfort level  Description: Interventions:  - Encourage patient to monitor pain and request assistance  - Assess pain using appropriate pain scale  - Administer analgesics based on type and severity of pain and evaluate response  - Implement non-pharmacological measures as appropriate and evaluate response  - Consider cultural and social influences on pain and pain management  - Notify physician/advanced practitioner if interventions unsuccessful or patient reports new pain  Outcome: Progressing     Problem: SAFETY ADULT  Goal: Patient will remain free of falls  Description: INTERVENTIONS:  - Educate patient/family on patient safety including physical limitations  - Instruct patient to call for assistance with activity   - Consult OT/PT to assist with strengthening/mobility   - Keep Call bell within reach  - Keep bed low and locked with side rails adjusted as appropriate  - Keep care items and personal belongings within reach  - Initiate and maintain comfort rounds  - Make Fall Risk Sign visible to staff  - Obtain necessary fall risk management equipment: walker  - Apply yellow socks and bracelet for high fall risk patients  - Consider moving patient to room near nurses station  Outcome: Progressing     Problem: DISCHARGE PLANNING  Goal: Discharge to home or other facility with appropriate resources  Description: INTERVENTIONS:  - Identify barriers to discharge w/patient and caregiver  - Arrange for needed discharge resources and transportation as appropriate  - Identify discharge learning needs (meds, wound care, etc.)  - Arrange for interpretive services to assist at discharge as needed  - Refer to Case Management Department for coordinating discharge planning if the patient needs post-hospital services based on physician/advanced practitioner order or complex needs related to functional status, cognitive ability, or social support system  Outcome: Progressing     Problem: Ineffective Coping  Goal: Demonstrates healthy coping skills  Outcome: Progressing  Goal: Participates in unit activities  Description: Interventions:  - Provide therapeutic environment   - Provide required programming   - Redirect inappropriate behaviors   Outcome: Progressing  Goal: Patient/Family participate in treatment and DC plans  Description: Interventions:  - Provide therapeutic environment  Outcome: Progressing  Goal: Patient/Family verbalizes awareness of resources  Outcome: Progressing  Goal: Understands least restrictive measures  Description: Interventions:  - Utilize least restrictive behavior  Outcome: Progressing  Goal: Free from restraint events  Description: - Utilize least restrictive measures   - Provide behavioral interventions   - Redirect inappropriate behaviors   Outcome: Progressing     Problem: Depression  Goal: Treatment Goal: Demonstrate behavioral control of depressive symptoms, verbalize feelings of improved mood/affect, and adopt new coping skills prior to discharge  Outcome: Progressing  Goal: Verbalize thoughts and feelings  Description: Interventions:  - Assess and re-assess patient's level of risk   - Engage patient in 1:1 interactions, daily, for a minimum of 15 minutes   - Encourage patient to express feelings, fears, frustrations, hopes   Outcome: Progressing  Goal: Refrain from harming self  Description: Interventions:  - Monitor patient closely, per order   - Supervise medication ingestion, monitor effects and side effects   Outcome: Progressing  Goal: Refrain from isolation  Description: Interventions:  - Develop a trusting relationship   - Encourage socialization   Outcome: Progressing  Goal: Refrain from self-neglect  Outcome: Progressing  Goal: Complete daily ADLs, including personal hygiene independently, as able  Description: Interventions:  - Observe, teach, and assist patient with ADLS  -  Monitor and promote a balance of rest/activity, with adequate nutrition and elimination   Outcome: Progressing     Problem: Anxiety  Goal: Anxiety is at manageable level  Description: Interventions:  - Assess and monitor patient's anxiety level. - Monitor for signs and symptoms (heart palpitations, chest pain, shortness of breath, headaches, nausea, feeling jumpy, restlessness, irritable, apprehensive). - Collaborate with interdisciplinary team and initiate plan and interventions as ordered. - [place] patient to unit/surroundings  - Explain treatment plan  - Encourage participation in care  - Encourage verbalization of concerns/fears  - Identify coping mechanisms  - Assist in developing anxiety-reducing skills  - Administer/offer alternative therapies  - Limit or eliminate stimulants  Outcome: Progressing     Problem: Nutrition/Hydration-ADULT  Goal: Nutrient/Hydration intake appropriate for improving, restoring or maintaining nutritional needs  Description: Monitor and assess patient's nutrition/hydration status for malnutrition. Collaborate with interdisciplinary team and initiate plan and interventions as ordered. Monitor patient's weight and dietary intake as ordered or per policy. Utilize nutrition screening tool and intervene as necessary. Determine patient's food preferences and provide high-protein, high-caloric foods as appropriate.      INTERVENTIONS:  - Monitor oral intake, urinary output, labs, and treatment plans  - Assess nutrition and hydration status and recommend course of action  - Evaluate amount of meals eaten  - Assist patient with eating if necessary   - Allow adequate time for meals  - Recommend/ encourage appropriate diets, oral nutritional supplements, and vitamin/mineral supplements  - Order, calculate, and assess calorie counts as needed  - Recommend, monitor, and adjust tube feedings and TPN/PPN based on assessed needs  - Assess need for intravenous fluids  - Provide specific nutrition/hydration education as appropriate  - Include patient/family/caregiver in decisions related to nutrition  Outcome: Progressing     Problem: Prexisting or High Potential for Compromised Skin Integrity  Goal: Skin integrity is maintained or improved  Description: INTERVENTIONS:  - Identify patients at risk for skin breakdown  - Assess and monitor skin integrity  - Assess and monitor nutrition and hydration status  - Monitor labs   - Assess for incontinence   - Turn and reposition patient  - Assist with mobility/ambulation  - Relieve pressure over bony prominences  - Avoid friction and shearing  - Provide appropriate hygiene as needed including keeping skin clean and dry  - Evaluate need for skin moisturizer/barrier cream  - Collaborate with interdisciplinary team   - Patient/family teaching  - Consider wound care consult   Outcome: Progressing

## 2023-11-02 NOTE — SOCIAL WORK
CM met with PT for PT check in. Reviewed the outcome of discussions yesterday with community supports. Reviewed option of going to [facility] and what this entails, expectations and referral process. PT reflected that she understood and is in agreement to going to [facility]. PT denies si/hi/ah/vh and reported anxiety and depression are improved. PT signed NICOLAS for [facility].

## 2023-11-02 NOTE — PROGRESS NOTES
Progress Note - Samantha Vasquez 59 y.o. female MRN: [id number]    Unit/Bed#: [facility] Encounter: [id number]        Subjective:   Patient seen and examined at bedside after reviewing the chart and discussing the case with the caring staff. Patient examined at bedside. Patient reports no acute complaints. Physical Exam   Vitals: Blood pressure 117/73, pulse 67, temperature (!) 97.1 °F (36.2 °C), temperature source Temporal, resp. rate 18, height 5' 1" (1.549 m), weight 85 kg (187 lb 6.4 oz), SpO2 100 %, not currently breastfeeding. ,Body mass index is 35.41 kg/m². Constitutional: Patient in no acute distress. HEENT: PERR, EOMI, MMM. Cardiovascular: Normal rate and regular rhythm. Pulmonary/Chest: Effort normal and breath sounds normal.   Abdomen: Soft, + BS, NT. Skin: Trace bilateral edema. Assessment/Plan:  Samantha Vasquez is a(n) 59 y.o. female with panic disorder. 1. Cardiac with hx HTN, HLD. Continue amlodipine 10 mg daily, atorvastatin 40 mg daily, ASA 81 mg daily. 2. Tardive dyskinesia. Home valbenazine tosylate 40 mg daily nonformulary. 3. Iron deficiency anemia. Patient is on ferrous sulfate 324 mg every other day. 4. DJD/OA/chronic back pain. Continue Lyrica 100 mg 3 times daily. Tylenol for mild/mod pain. Tramadol q6h prn for severe pain, Robaxin and Voltaren gel prn.  5. Gait abnormality. PT OT consulted for further evaluation. 6. GERD. Patient is on Protonix 40 mg daily. 7. Vitamin D deficiency. Patient started on vitamin D2 50,000 units weekly for 8 weeks followed by vitamin D3 1000 units daily. 8. Vitamin B12 deficiency. Patient started on vitamin B-12 monthly injections. 9. Allergic rhinitis. Started on Claritin 10 mg daily 10/25/23. Flonase prn. The patient was discussed with Dr. Jamel Keys and he is in agreement with the above note.

## 2023-11-02 NOTE — PROGRESS NOTES
11/02/23 9093   Team Meeting   Meeting Type Daily Rounds   Team Members Present   Team Members Present Physician;Nurse;;Occupational Therapist   Physician Team Member Dr. Alfonso Kennedy MD; SHAKILA Singh   Nursing Team Member Ashish Hurst RN   Care Management Team Member MS Francois, [facility], [facility]   OT Team Member Jaime Brewer   Patient/Family Present   Patient Present No   Patient's Family Present No     PT unable to return to family home with waiver services. PT now agreeable to [facility]  to begin referrals. Slept, blunted, visbable, calm, cooperative, medication compliant.

## 2023-11-02 NOTE — PROGRESS NOTES
Progress Note - Behavioral Health     Samantha Yates 59 y.o. female MRN: [id number]   Unit/Bed#: [facility] 200-01 Encounter: [id number]    Behavior over the last 24 hours: unchanged. Samantha was seen in follow-up. Overnight, she slept with no reported behavioral disturbances. Has been reporting more severe anxiety and depressive symptoms. Did accept atarax yesterday afternoon for anxiety symptoms. Otherwise tolerating medications without side effects. Samantha was seen today in group room. She continues to report ongoing depressive any anxiety symptoms. Reports to continuously feeling like a burden to her children in addition to feelings of helplessness. She is worried about her discharge planning since she is unable to go back and live with her children. She denied any recent panic attacks yesterday or feelings of impending doom. No SI/HI/AVH.      Sleep: normal  Appetite: normal  Medication side effects: No   ROS: no complaints, all other systems are negative  VS reviewed and stable    Mental Status Evaluation:    Appearance:  age appropriate, casually dressed, dressed appropriately   Behavior:  pleasant, cooperative, calm   Speech:  dysarthric   Mood:  anxious,    Affect:  Mood-congruent, worried   Thought Process:  goal directed   Associations: intact associations   Thought Content:  negative thoughts, ruminating thoughts   Perceptual Disturbances: no auditory hallucinations, no visual hallucinations   Risk Potential: Suicidal ideation - None at present  Homicidal ideation - None at present  Potential for aggression - No   Sensorium:  oriented to person, place, and time/date   Memory:  recent and remote memory grossly intact   Consciousness:  alert and awake   Attention/Concentration: attention span and concentration are age appropriate   Insight:  limited   Judgment: limited   Gait/Station: uses walker   Motor Activity: abnormal movement noted: dyskinetic face movements present     Vital signs in last 24 hours: Temp:  [97.1 °F (36.2 °C)-98.1 °F (36.7 °C)] 97.1 °F (36.2 °C)  HR:  [67-71] 67  Resp:  [18] 18  BP: (105-117)/(59-73) 117/73    Laboratory results: I have personally reviewed all pertinent laboratory/tests results    Results from the past 24 hours: No results found for this or any previous visit (from the past 24 hour(s)). Most Recent Labs:   Lab Results   Component Value Date    WBC 3.39 (L) 10/19/2023    RBC 4.08 10/19/2023    HGB 12.9 10/19/2023    HCT 38.6 10/19/2023     10/19/2023    RDW 13.4 10/19/2023    NEUTROABS 1.17 (L) 10/19/2023    TOTANEUTABS 0.83 (L) 05/25/2023    SODIUM 139 10/19/2023    K 3.6 10/19/2023     10/19/2023    CO2 28 10/19/2023    BUN 11 10/19/2023    CREATININE 0.66 10/19/2023    GLUC 86 10/19/2023    CALCIUM 8.7 10/19/2023    AST 24 10/18/2023    ALT 27 10/18/2023    ALKPHOS 102 10/18/2023    TP 6.2 (L) 10/18/2023    ALB 4.3 10/18/2023    TBILI 0.76 10/18/2023    CHOLESTEROL 153 10/19/2023    HDL 63 10/19/2023    TRIG 70 10/19/2023    LDLCALC 76 10/19/2023    [address] 90 10/19/2023    LITHIUM <0.1 (L) 03/23/2023    AMMONIA <10 (L) 06/27/2021    RDJ0GRXSTXMC 0.765 10/18/2023    FREET4 0.80 02/13/2020    PREGSERUM Negative 02/22/2014    HGBA1C 4.5 07/19/2023    EAG 82 07/19/2023       Progress Toward Goals: progressing    Assessment/Plan   Principal Problem:    Generalized anxiety disorder with panic attacks  Active Problems:    Post traumatic stress disorder    Depression      Recommended Treatment:     Planned medication and treatment changes:     All current active medications have been reviewed  Encourage group therapy, milieu therapy and occupational therapy  Behavioral Health checks every 7 minutes    Increase Zoloft to 125 mg QAM for mood and anxiety symptoms  New labs ordered for lipid monitoring last done on 9/15/23  Continue al other medications - discussed option of adding low dose Remeron for anxiety symptoms  DC disposition remains ongoing - will need referral to a [facility]    Current Facility-Administered Medications   Medication Dose Route Frequency Provider Last Rate    acetaminophen  650 mg Oral Q4H PRN Martha Braden PA-C      acetaminophen  975 mg Oral Q6H PRN Martha Braden PA-C      aluminum-magnesium hydroxide-simethicone  30 mL Oral Q4H PRN Isael Barney MD      amLODIPine  10 mg Oral Daily Isael Barney MD      aspirin  81 mg Oral Daily Martha Braden PA-C      atorvastatin  40 mg Oral Daily With Dinner Isael Barney MD      [START ON 12/10/2023] cholecalciferol  1,000 Units Oral Daily Martha Braden PA-C      clonazePAM  1 mg Oral HS Rudy A Prayson, DO      clonazePAM  1 mg Oral Daily Amadou Charline Brock PA-C      cyanocobalamin  1,000 mcg Intramuscular Q30 Days Martha Braden PA-C      Diclofenac Sodium  2 g Topical 4x Daily PRN Martha Braden PA-C      ergocalciferol  50,000 Units Oral Weekly Martha Braden PA-C      ferrous sulfate  325 mg Oral Every Other Day Martha Braden PA-C      fluticasone  2 spray Each Nare Daily PRN Martha Braden PA-C      haloperidol lactate  5 mg Intramuscular Q4H PRN Max 4/day Isael Barney MD      hydrOXYzine HCL  25 mg Oral Q6H PRN Max 4/day Isael Barney MD      influenza vaccine  0.5 mL Intramuscular Prior to discharge Arman Cushing, MD      loratadine  10 mg Oral Daily Martha Braden PA-C      LORazepam  1 mg Intramuscular Q6H PRN Max 3/day Isael Barney MD      melatonin  6 mg Oral HS Rudy A Prayson, DO      nicotine polacrilex  4 mg Oral Q2H PRN Isael Barney MD      pantoprazole  40 mg Oral Early Morning Isael Barney MD      polyethylene glycol  17 g Oral Daily PRN Martha Braden PA-C      prazosin  1 mg Oral HS Rudy A Prayson, DO      pregabalin  100 mg Oral TID Isael Barney MD      QUEtiapine  200 mg Oral HS Rudy A Prayson, DO      risperiDONE  0.25 mg Oral Q4H PRN Max 6/day Isael Barney MD      risperiDONE  0.5 mg Oral Q4H PRN Max 3/day Rosalba Lowry MD      risperiDONE  1 mg Oral Q2H PRN Max 3/day Rosalba Lowry MD      sertraline  100 mg Oral Daily Lowell Ferrari PA-C      traMADol  50 mg Oral Q6H PRN Hung Ramos MD       Risks / Benefits of Treatment:    Risks, benefits, and possible side effects of medications explained to patient and patient verbalizes understanding and agreement for treatment. Counseling / Coordination of Care: Total floor / unit time spent today 20 minutes. Greater than 50% of total time was spent with the patient and / or family counseling and / or coordination of care. A description of counseling / coordination of care:  Patient's progress discussed with staff in treatment team meeting. Medications, treatment progress and treatment plan reviewed with patient.     Gallo Pearson PA-C 11/02/23

## 2023-11-02 NOTE — SOCIAL WORK
[facility] [place] [phone] [phone]  , spoke with Beth Stephenson, they have a first floor bed available. CM to fax referral to [phone]. Beth Stephenson will follow up with CM once reviewed. Call ended mutually. CM faxed requested clinical to Beth Stephenson at [facility], confirmation received.

## 2023-11-02 NOTE — PLAN OF CARE
Problem: PHYSICAL THERAPY ADULT  Goal: Performs mobility at highest level of function for planned discharge setting. See evaluation for individualized goals. Description: Treatment/Interventions: Functional transfer training, LE strengthening/ROM, Therapeutic exercise, Endurance training, Elevations, Patient/family training, Equipment eval/education, Bed mobility, Gait training, Spoke to nursing, Spoke to case management  Equipment Recommended:  (pt encouraged to use RW at this time)       See flowsheet documentation for full assessment, interventions and recommendations. Outcome: Progressing  Note: Prognosis: Good  Problem List: Decreased strength, Decreased endurance, Impaired balance, Decreased mobility  Assessment: Pt seen for PT treatment session this date with interventions consisting of transfer training, gait training, seated TE, neuromuscular re-education of movement performed to improve dynamic sitting balance and improve trunk control, and education provided as needed for safety and direction to improve functional mobility, safety awareness, and activity tolerance. Pt agreeable to PT treatment session upon arrival, pt found sitting in community room. At end of session, pt left sitting in community room with all needs in reach. In comparison to previous session, pt with improvements in ambulation distance . Continue to recommend Home PT at time of d/c in order to maximize pt's functional independence and safety w/ mobility. Pt continues to be functioning below baseline level. PT will continue to see pt while here in order to address the deficits listed above and provide interventions consistent w/ POC in effort to achieve STGs. Barriers to Discharge: Inaccessible home environment, Decreased caregiver support     Rehab Resource Intensity Level, PT: III (Minimum Resource Intensity)    See flowsheet documentation for full assessment.

## 2023-11-02 NOTE — PHYSICAL THERAPY NOTE
PHYSICAL THERAPY TREATMENT NOTE  NAME:  Samantha Lundberg Host  DATE: 11/02/23    Length Of Stay: 14  Performed at least 2 patient identifiers during session: Name and ID bracelet    TREATMENT FLOWSHEET:    11/02/23 1140   PT Last Visit   PT Visit Date 11/02/23   Note Type   Note Type Treatment   Pain Assessment   Pain Assessment Tool 0-10   Pain Score No Pain   Restrictions/Precautions   Weight Bearing Precautions Per Order No   Other Precautions Cognitive; Fall Risk   General   Chart Reviewed Yes   Response to Previous Treatment Patient with no complaints from previous session. Family/Caregiver Present No   Cognition   Overall Cognitive Status Impaired   Arousal/Participation Alert; Cooperative   Attention Within functional limits   Orientation Level Oriented X4   Memory Decreased recall of recent events   Following Commands Follows one step commands without difficulty   Subjective   Subjective "I was measured for a back brace before I came here."   Bed Mobility   Additional Comments OOB sitting in community room   Transfers   Sit to Stand 6  Modified independent   Additional items Armrests; Increased time required  (RW)   Stand to Sit 6  Modified independent   Additional items Armrests; Increased time required   Stand pivot 6  Modified independent   Additional items Armrests; Increased time required  (RW)   Ambulation/Elevation   Gait pattern Improper Weight shift; Forward Flexion;Decreased foot clearance; Short stride; Excessively slow;Decreased heel strike;Decreased toe off;Decreased hip extension; Step through pattern   Gait Assistance 6  Modified independent   Assistive Device Rolling walker   Distance 250ft   Ambulation/Elevation Additional Comments VC's proided for proper upright posture   Balance   Static Sitting Normal   Dynamic Sitting Good   Static Standing Fair +   Dynamic Standing Fair   Ambulatory Fair   Endurance Deficit   Endurance Deficit Yes   Activity Tolerance   Activity Tolerance Patient limited by fatigue   Medical Staff Made Aware PCA aware   Nurse Made Aware yes   Exercises   Quad Sets Sitting;20 reps;AROM; Bilateral   Heelslides Sitting;20 reps;AROM; Bilateral   Glute Sets Sitting;20 reps;AROM; Bilateral   Hip Flexion Sitting;20 reps;AROM; Bilateral   Hip Abduction Sitting;20 reps;AROM; Bilateral   Hip Adduction Sitting;20 reps;AROM; Bilateral   Knee AROM Long Arc Quad Sitting;20 reps;AROM; Bilateral   Ankle Pumps Sitting;20 reps;AROM; Bilateral   Marching Sitting;20 reps;AROM; Bilateral   Neuro re-ed Patient performed dynamic sitting balance activities while sitting at EOB unsupported with close S   including multidirectional weight shifting, reaching, trunk rotation, lateral trunk side bends, trunk flexion and extension, and scooting. Assessment   Prognosis Good   Problem List Decreased strength;Decreased endurance; Impaired balance;Decreased mobility   Goals   Patient Goals to get rollator walker, teeth from dentist, find lost controller for spinal stimulator, and get back brace she was meadured for prior to admission   PT Treatment Day 2   Plan   Treatment/Interventions Functional transfer training;LE strengthening/ROM; Elevations; Therapeutic exercise; Endurance training;Patient/family training;Equipment eval/education; Bed mobility;Gait training; Compensatory technique education;Spoke to nursing   Progress Progressing toward goals   PT Frequency Other (Comment)  (2-5x/wk)   Discharge Recommendation   Rehab Resource Intensity Level, PT III (Minimum Resource Intensity)   AM-PAC Basic Mobility Inpatient   Turning in Flat Bed Without Bedrails 4   Lying on Back to Sitting on Edge of Flat Bed Without Bedrails 4   Moving Bed to Chair 4   Standing Up From Chair Using Arms 4   Walk in Room 4   Climb 3-5 Stairs With Railing 2   Basic Mobility Inpatient Raw Score 22   Basic Mobility Standardized Score 47.4   Highest Level Of Mobility   JH-HLM Goal 7: Walk 25 feet or more   JH-HLM Achieved 8: Walk 250 feet ot more       The patient's AM-PAC Basic Mobility Inpatient Short Form Raw Score is 22. A raw score greater than 16 suggests the patient may benefit from discharge to home. Please also refer to the recommendation of the Physical Therapist for safe discharge planning. Pt seen for PT treatment session this date with interventions consisting of transfer training, gait training, seated TE, neuromuscular re-education of movement performed to improve dynamic sitting balance and improve trunk control, and education provided as needed for safety and direction to improve functional mobility, safety awareness, and activity tolerance. Pt agreeable to PT treatment session upon arrival, pt found sitting in community room. At end of session, pt left sitting in community room with all needs in reach. In comparison to previous session, pt with improvements in ambulation distance . Continue to recommend Home PT at time of d/c in order to maximize pt's functional independence and safety w/ mobility. Pt continues to be functioning below baseline level. PT will continue to see pt while here in order to address the deficits listed above and provide interventions consistent w/ POC in effort to achieve STGs.     Carlene Najera

## 2023-11-02 NOTE — NURSING NOTE
Patient was observed to be visible in the community this evening. She is quiet and withdrawn to self. Calm and cooperative. She endorses severe anxiety and depresssion tonight, denies SI, HI and hallucinations. Josh any pain. Samantha was medication compliant at HS. Held scheduled Minipress for systolic . Patient c/o b/l lower leg and foot swelling for which she was encouraged to elevate legs in bed during HS. Positive for snack and fluids tonight. LBM 11/1/2023. Continuous safety rounding in progress.

## 2023-11-02 NOTE — NURSING NOTE
Pt medicated for c/o increased anxiety @ 1412 with Atarax 25 mg po with relief obtained. Clement Samaria - 16 @ that time. Pt medicated for c/o headache with Tylenol po @ 1411 with relief obtained. Pt napping in her room @ present. Q 7 min checks maintained to monitor pt's behavior & safety.

## 2023-11-02 NOTE — NURSING NOTE
Pt is pleasant & selectively social with other patients. Pt c/o mild depression & mild anxiety. Pt denies any hallucinations, suicidal ore homicidal ideations. Q 7 min checks maintained to monitor pt's behavior & safety. Pt is cooperative & compliant with medications. Trace edema noted BLE. Pt up ad dudley with walker. Pt enjoys reading novels in her room.

## 2023-11-03 LAB
CHOLEST SERPL-MCNC: 129 MG/DL
HDLC SERPL-MCNC: 63 MG/DL
LDLC SERPL CALC-MCNC: 57 MG/DL (ref 0–100)
NONHDLC SERPL-MCNC: 66 MG/DL
TRIGL SERPL-MCNC: 44 MG/DL

## 2023-11-03 PROCEDURE — 99232 SBSQ HOSP IP/OBS MODERATE 35: CPT | Performed by: PSYCHIATRY & NEUROLOGY

## 2023-11-03 PROCEDURE — 80061 LIPID PANEL: CPT | Performed by: PSYCHIATRY & NEUROLOGY

## 2023-11-03 PROCEDURE — 97110 THERAPEUTIC EXERCISES: CPT

## 2023-11-03 RX ORDER — DIPHENHYDRAMINE HYDROCHLORIDE, ZINC ACETATE 2; .1 G/100G; G/100G
CREAM TOPICAL 3 TIMES DAILY PRN
Status: DISCONTINUED | OUTPATIENT
Start: 2023-11-03 | End: 2023-11-10 | Stop reason: HOSPADM

## 2023-11-03 RX ORDER — ALBUTEROL SULFATE 90 UG/1
2 AEROSOL, METERED RESPIRATORY (INHALATION) EVERY 4 HOURS PRN
Status: DISCONTINUED | OUTPATIENT
Start: 2023-11-03 | End: 2023-11-10 | Stop reason: HOSPADM

## 2023-11-03 RX ADMIN — PREGABALIN 100 MG: 100 CAPSULE ORAL at 15:37

## 2023-11-03 RX ADMIN — AMLODIPINE BESYLATE 10 MG: 10 TABLET ORAL at 08:43

## 2023-11-03 RX ADMIN — PANTOPRAZOLE SODIUM 40 MG: 40 TABLET, DELAYED RELEASE ORAL at 05:57

## 2023-11-03 RX ADMIN — ATORVASTATIN CALCIUM 40 MG: 40 TABLET, FILM COATED ORAL at 15:37

## 2023-11-03 RX ADMIN — PREGABALIN 100 MG: 100 CAPSULE ORAL at 21:40

## 2023-11-03 RX ADMIN — Medication 6 MG: at 21:40

## 2023-11-03 RX ADMIN — TRAMADOL HYDROCHLORIDE 50 MG: 50 TABLET, COATED ORAL at 15:37

## 2023-11-03 RX ADMIN — QUETIAPINE 200 MG: 50 TABLET, FILM COATED, EXTENDED RELEASE ORAL at 21:40

## 2023-11-03 RX ADMIN — CLONAZEPAM 1 MG: 1 TABLET ORAL at 08:43

## 2023-11-03 RX ADMIN — ASPIRIN 81 MG: 81 TABLET, COATED ORAL at 08:43

## 2023-11-03 RX ADMIN — PRAZOSIN HYDROCHLORIDE 1 MG: 1 CAPSULE ORAL at 21:40

## 2023-11-03 RX ADMIN — CLONAZEPAM 1 MG: 1 TABLET ORAL at 21:40

## 2023-11-03 RX ADMIN — HYDROXYZINE HYDROCHLORIDE 25 MG: 25 TABLET ORAL at 15:37

## 2023-11-03 RX ADMIN — LORATADINE 10 MG: 10 TABLET ORAL at 08:43

## 2023-11-03 RX ADMIN — PREGABALIN 100 MG: 100 CAPSULE ORAL at 08:42

## 2023-11-03 RX ADMIN — FERROUS SULFATE TAB 325 MG (65 MG ELEMENTAL FE) 325 MG: 325 (65 FE) TAB at 08:43

## 2023-11-03 RX ADMIN — SERTRALINE HYDROCHLORIDE 125 MG: 25 TABLET ORAL at 08:42

## 2023-11-03 NOTE — SOCIAL WORK
CM placed call to [facility] to follow up on status of referral. 21  . Left message requesting return call.

## 2023-11-03 NOTE — NURSING NOTE
Patient was visible in milieu, spent most of the shift in dinning room socializing with select peers. patient was calm, cooperative and pleasant upon approach. Patient complied with medications and meals, denies SI, HI, AV or VH. Staff maintained around the clock safety checks,administered medications as prescribed,and assisted as needed. We will continue to monitor, support and encourage.

## 2023-11-03 NOTE — PLAN OF CARE
Problem: OCCUPATIONAL THERAPY ADULT  Goal: Performs self-care activities at highest level of function for planned discharge setting. See evaluation for individualized goals. Description: Treatment Interventions: ADL retraining, UE strengthening/ROM, Functional transfer training, Endurance training, Patient/family training, Energy conservation, Activityengagement          See flowsheet documentation for full assessment, interventions and recommendations. Outcome: Progressing  Note: Limitation: Decreased ADL status, Decreased UE strength, Decreased Safe judgement during ADL, Decreased endurance, Decreased high-level ADLs  Prognosis: Good  Assessment: Patient participated in Skilled OT session this date with interventions consisting of Energy Conservation techniques and therapeutic exercise to: increase functional use of BUEs, increase BUE muscle strength  . Patient agreeable to OT treatment session, upon arrival patient was found seated OOB to Chair, alert, responsive , and in no apparent distress. Patient tolerated UB TE as detailed in flow sheet. Patient reports pain 8/10 in BLEs. Patient declined further activity. Session ended with patient seated OBO to chair and all needs met. In comparison to previous session, patient with improvements in UB strength and endurance. Patient requiring verbal cues for correct technique, verbal cues for pacing thru activity steps, and frequent rest periods. Patient performance  demonstrated good carryover of learned techniques and strategies to facilitate safety during functional tasks. Patient continues to be functioning below baseline level, occupational performance remains limited secondary to factors listed above and increased risk for falls and injury. From OT standpoint, recommendation at time of d/c would be Home OT.   Patient to benefit from continued Occupational Therapy treatment while in the hospital to address deficits as defined above and maximize level of functional independence with ADLs and functional mobility in order to return to PLOF.      Rehab Resource Intensity Level, OT: III (Minimum Resource Intensity)

## 2023-11-03 NOTE — OCCUPATIONAL THERAPY NOTE
11/03/23 0939   OT Last Visit   OT Visit Date 11/03/23   Note Type   Note Type Treatment   Pain Assessment   Pain Assessment Tool 0-10   Pain Score 8   Pain Location/Orientation Orientation: Bilateral;Location: Leg   Pain Onset/Description Onset: Ongoing   Patient's Stated Pain Goal No pain   Hospital Pain Intervention(s) Medication (See MAR); Ambulation/increased activity; Emotional support   Restrictions/Precautions   Weight Bearing Precautions Per Order No   Other Precautions Cognitive; Fall Risk   Therapeutic Excerise-Strength   UE Strength Yes   Right Upper Extremity- Strength   R Shoulder Flexion; Extension;Horizontal ABduction  (horiz. add, scap pro/ret)   R Elbow Elbow flexion;Elbow extension   R Wrist   (wrist rotation)   R Position Seated   Equipment Dumbbell   R Weight/Reps/Sets 1.5# weight x 15 reps   Left Upper Extremity-Strength   L Weights/Reps/Sets TE same as RUE above   Cognition   Overall Cognitive Status Impaired   Arousal/Participation Alert; Cooperative   Attention Within functional limits   Orientation Level Oriented X4   Memory Decreased recall of recent events   Following Commands Follows one step commands without difficulty   Comments PT agreeable to OT treatment. Activity Tolerance   Activity Tolerance Patient limited by pain   Assessment   Assessment Patient participated in Skilled OT session this date with interventions consisting of Energy Conservation techniques and therapeutic exercise to: increase functional use of BUEs, increase BUE muscle strength  . Patient agreeable to OT treatment session, upon arrival patient was found seated OOB to Chair, alert, responsive , and in no apparent distress. Patient tolerated UB TE as detailed in flow sheet. Patient reports pain 8/10 in BLEs. Patient declined further activity. Session ended with patient seated OBO to chair and all needs met. In comparison to previous session, patient with improvements in UB strength and endurance.  Patient requiring verbal cues for correct technique, verbal cues for pacing thru activity steps, and frequent rest periods. Patient performance  demonstrated good carryover of learned techniques and strategies to facilitate safety during functional tasks. Patient continues to be functioning below baseline level, occupational performance remains limited secondary to factors listed above and increased risk for falls and injury. From OT standpoint, recommendation at time of d/c would be Home OT. Patient to benefit from continued Occupational Therapy treatment while in the hospital to address deficits as defined above and maximize level of functional independence with ADLs and functional mobility in order to return to OF. Plan   Treatment Interventions UE strengthening/ROM; Energy conservation   Goal Expiration Date 11/09/23   OT Treatment Day 2   OT Frequency   (2-5x/wk)   Discharge Recommendation   Rehab Resource Intensity Level, OT III (Minimum Resource Intensity)   Additional Comments  The patient's raw score on the AM-PAC Daily Activity Inpatient Short Form is 19. A raw score of greater than or equal to 19 suggests the patient may benefit from discharge to home. Please refer to the recommendation of the Occupational Therapist for safe discharge planning.    AM-PAC Daily Activity Inpatient   Lower Body Dressing 2   Bathing 3   Toileting 3   Upper Body Dressing 3   Grooming 4   Eating 4   Daily Activity Raw Score 19   Daily Activity Standardized Score (Calc for Raw Score >=11) 40.22   AM-PAC Applied Cognition Inpatient   Following a Speech/Presentation 3   Understanding Ordinary Conversation 4   Taking Medications 3   Remembering Where Things Are Placed or Put Away 3   Remembering List of 4-5 Errands 3   Taking Care of Complicated Tasks 2   Applied Cognition Raw Score 18   Applied Cognition Standardized Score 38.07       Ana Hammonds

## 2023-11-03 NOTE — NURSING NOTE
ANTIONE stocking not applied at this time as order not placed until this evening. Will measure patient and apply tomorrow.

## 2023-11-03 NOTE — PROGRESS NOTES
11/03/23 1637   Pain Assessment Post Intervention   Pain Assessment Tool Used: 0-10   Post Intervention Pain Score 4   Post Intervention Pain Location/Orientation Location: Back;Orientation: Bilateral;Location: Leg   Post Intervention POSS 1   Response to Interventions Patient reports relief     Medication effective.

## 2023-11-03 NOTE — PHYSICAL THERAPY NOTE
11/03/23 1221   PT Last Visit   PT Visit Date 11/03/23   Note Type   Note Type Cancelled Session   Cancel Reasons Refusal                                          Physical Therapy Cancellation Note       PT treatment offered this afternoon but patient declined expressing not feeling up to it today. Patient was educated in the benefits of participation but patient continued to decline. Will continue to offer PT treatment as ordered and progress as tolerated when patient willing to partake.          Carlene Cool

## 2023-11-03 NOTE — NURSING NOTE
Upon reassessment of PRN hydroxyzine, patient reports feeling less anxious and is able to sit in the dining room to eat dinner and socialize with peers. Medication effective.

## 2023-11-03 NOTE — PROGRESS NOTES
Progress Note - Behavioral Health     Samanthakiran Lindsay 59 y.o. female MRN: [id number]   Unit/Bed#: [facility] 200-01 Encounter: [id number]    Behavior over the last 24 hours: unchanged. Samantha was seen in follow-up. Per staff, no significant events reported overnight. Still continues to endorse feelings of depressed mood and anxiety now surrounding her discharge disposition. She has has no other panic episodes. Accepted atarax yesterday afternoon for anxiety. Tolerating medications. Accepting meals and slept. She was seen resting in group room reading. She states that she continues to struggle with bouts of depression ad anxiety. She states she is worried about worsening swelling in her legs and a new wheeze. Informed to speak with medical provider. She remains fairly rigid in terms of her discharge options. I explained there is an option for [facility], which is where she previously tried to get into which she is interested in. Due to ongoing depressed mood and anxiety I offer possible increase in Zoloft over the weekend. However, is complaining of a headache today. No SI/HI/AVH.      Sleep: normal  Appetite: normal  Medication side effects: No   ROS: no complaints, all other systems are negative  VS reviewed and stable    Mental Status Evaluation:    Appearance:  age appropriate, casually dressed, dressed appropriately   Behavior:  pleasant, cooperative, calm   Speech:  dysarthric   Mood:  anxious,    Affect:  Mood-congruent, worried   Thought Process:  goal directed   Associations: intact associations   Thought Content:  negative thoughts, ruminating thoughts   Perceptual Disturbances: no auditory hallucinations, no visual hallucinations   Risk Potential: Suicidal ideation - None at present  Homicidal ideation - None at present  Potential for aggression - No   Sensorium:  oriented to person, place, and time/date   Memory:  recent and remote memory grossly intact   Consciousness:  alert and awake Attention/Concentration: attention span and concentration are age appropriate   Insight:  limited   Judgment: limited   Gait/Station: uses walker   Motor Activity: abnormal movement noted: dyskinetic face movements present     Vital signs in last 24 hours:    Temp:  [97.3 °F (36.3 °C)-98 °F (36.7 °C)] 97.3 °F (36.3 °C)  HR:  [59-75] 59  Resp:  [17-18] 17  BP: (115-123)/(57-69) 123/59    Laboratory results: I have personally reviewed all pertinent laboratory/tests results    Results from the past 24 hours: No results found for this or any previous visit (from the past 24 hour(s)). Most Recent Labs:   Lab Results   Component Value Date    WBC 3.39 (L) 10/19/2023    RBC 4.08 10/19/2023    HGB 12.9 10/19/2023    HCT 38.6 10/19/2023     10/19/2023    RDW 13.4 10/19/2023    NEUTROABS 1.17 (L) 10/19/2023    TOTANEUTABS 0.83 (L) 05/25/2023    SODIUM 139 10/19/2023    K 3.6 10/19/2023     10/19/2023    CO2 28 10/19/2023    BUN 11 10/19/2023    CREATININE 0.66 10/19/2023    GLUC 86 10/19/2023    CALCIUM 8.7 10/19/2023    AST 24 10/18/2023    ALT 27 10/18/2023    ALKPHOS 102 10/18/2023    TP 6.2 (L) 10/18/2023    ALB 4.3 10/18/2023    TBILI 0.76 10/18/2023    CHOLESTEROL 153 10/19/2023    HDL 63 10/19/2023    TRIG 70 10/19/2023    LDLCALC 76 10/19/2023    [address] 10/19/2023    LITHIUM <0.1 (L) 03/23/2023    AMMONIA <10 (L) 06/27/2021    CJA7TMIWWRNN 0.765 10/18/2023    FREET4 0.80 02/13/2020    PREGSERUM Negative 02/22/2014    HGBA1C 4.5 07/19/2023    EAG 82 07/19/2023       Progress Toward Goals: progressing    Assessment/Plan   Principal Problem:    Generalized anxiety disorder with panic attacks  Active Problems:    Post traumatic stress disorder    Depression      Recommended Treatment:     Planned medication and treatment changes:     All current active medications have been reviewed  Encourage group therapy, milieu therapy and occupational therapy  Behavioral Health checks every 7 minutes    Continue current medications and therapy  DC planning ongoing - possible d/c to [facility]    Current Facility-Administered Medications   Medication Dose Route Frequency Provider Last Rate    acetaminophen  650 mg Oral Q4H PRN Martha Braden PA-C      acetaminophen  975 mg Oral Q6H PRN Martha Braden PA-C      aluminum-magnesium hydroxide-simethicone  30 mL Oral Q4H PRN Roly Xie MD      amLODIPine  10 mg Oral Daily Roly Xie MD      aspirin  81 mg Oral Daily Martha Braden PA-C      atorvastatin  40 mg Oral Daily With Dinner Roly Xie MD      [START ON 12/10/2023] cholecalciferol  1,000 Units Oral Daily Martha Braden PA-C      clonazePAM  1 mg Oral HS Rudy DEWAYNE Tobaryson, DO      clonazePAM  1 mg Oral Daily Carole Brock PA-C      cyanocobalamin  1,000 mcg Intramuscular Q30 Days Martha Braden PA-C      Diclofenac Sodium  2 g Topical 4x Daily PRN Martha Braden PA-C      ergocalciferol  50,000 Units Oral Weekly Martha Braden PA-C      ferrous sulfate  325 mg Oral Every Other Day Martha Braden PA-C      fluticasone  2 spray Each Nare Daily PRN Martha Braden PA-C      haloperidol lactate  5 mg Intramuscular Q4H PRN Max 4/day Roly Xie MD      hydrOXYzine HCL  25 mg Oral Q6H PRN Max 4/day Roly Xie MD      influenza vaccine  0.5 mL Intramuscular Prior to discharge Kathy Martínez MD      loratadine  10 mg Oral Daily Martha Braden PA-C      LORazepam  1 mg Intramuscular Q6H PRN Max 3/day Roly Xie MD      melatonin  6 mg Oral HS Rudy DEWAYNE Tobaryson, DO      nicotine polacrilex  4 mg Oral Q2H PRN Roly Xie MD      pantoprazole  40 mg Oral Early Morning Roly Xie MD      polyethylene glycol  17 g Oral Daily PRN Martha Braden PA-C      prazosin  1 mg Oral HS Rudy DEWAYNE Tobaryson, DO      pregabalin  100 mg Oral TID Roly Xie MD      QUEtiapine  200 mg Oral HS Rudy York DO      risperiDONE  0.25 mg Oral Q4H PRN Max 6/day Prosper Ugarte MD      risperiDONE  0.5 mg Oral Q4H PRN Max 3/day Prosper Ugarte MD      risperiDONE  1 mg Oral Q2H PRN Max 3/day Prosper Ugarte MD      sertraline  125 mg Oral Daily Aminta Ferrari PA-C      traMADol  50 mg Oral Q6H PRN Maximino Olszewski, MD       Risks / Benefits of Treatment:    Risks, benefits, and possible side effects of medications explained to patient and patient verbalizes understanding and agreement for treatment. Counseling / Coordination of Care: Total floor / unit time spent today 20 minutes. Greater than 50% of total time was spent with the patient and / or family counseling and / or coordination of care. A description of counseling / coordination of care:  Patient's progress discussed with staff in treatment team meeting. Medications, treatment progress and treatment plan reviewed with patient.     Adriano Suarez PA-C 11/03/23

## 2023-11-03 NOTE — NURSING NOTE
Patient has been visible in the milieu this morning. She is pleasant and brightens upon approach. Selectively social with peers. She has been compliant with her scheduled medications. Her appetite is good-100 % of meals. She continues to endorse moderate anxiety and depression. Denies Si/Hi and hallucinations. She denies pain. She has been up and ambulatory without assistance- uses a walker. She is able to make her needs known. Plan of care continues. Q7 minute safety checks in progress.

## 2023-11-03 NOTE — PROGRESS NOTES
Progress Note - Samantha Bustamante 59 y.o. female MRN: [id number]    Unit/Bed#: [facility] Encounter: [id number]        Subjective:   Patient seen and examined at bedside after reviewing the chart and discussing the case with the caring staff. Patient examined at bedside. Patient complaining of swelling in her legs and states she needs a water pill. States she also has wheezing. Denies shortness of breath or cough. Also complains of skin itching at times throughout the night and would like benadryl cream.     Physical Exam   Vitals: Blood pressure 116/74, pulse 75, temperature 97.7 °F (36.5 °C), temperature source Temporal, resp. rate 18, height 5' 1" (1.549 m), weight 85 kg (187 lb 6.4 oz), SpO2 100 %, not currently breastfeeding. ,Body mass index is 35.41 kg/m². Constitutional: Patient in no acute distress. HEENT: PERR, EOMI, MMM. Cardiovascular: Normal rate and regular rhythm. Pulmonary/Chest: Effort normal and breath sounds normal, slight expiratory wheeze. Abdomen: Soft, + BS, NT. Skin: Trace bilateral edema, no erythema, no warmth, no calf tenderness. Assessment/Plan:  Samantha Bustamante is a(n) 59 y.o. female with panic disorder. 1. Cardiac with hx HTN, HLD. Continue amlodipine 10 mg daily, atorvastatin 40 mg daily, ASA 81 mg daily. 2. Tardive dyskinesia. Home valbenazine tosylate 40 mg daily nonformulary. 3. Iron deficiency anemia. Patient is on ferrous sulfate 324 mg every other day. 4. DJD/OA/chronic back pain. Continue Lyrica 100 mg 3 times daily. Tylenol for mild/mod pain. Tramadol q6h prn for severe pain, Robaxin and Voltaren gel prn.  5. Gait abnormality. PT OT consulted for further evaluation. 6. GERD. Patient is on Protonix 40 mg daily. 7. Vitamin D deficiency. Patient started on vitamin D2 50,000 units weekly for 8 weeks followed by vitamin D3 1000 units daily. 8. Vitamin B12 deficiency. Patient started on vitamin B-12 monthly injections.   9. Allergic rhinitis/itching. Started on Claritin 10 mg daily 10/25/23. Flonase and benadryl cream prn. 10. Wheezing. Albuterol inhaler prn. 11. Leg swelling. Compression socks ordered. Encouraged to elevate extremities. Continue to monitor. The patient was discussed with Dr. Maegan Woodson and he is in agreement with the above note.

## 2023-11-04 PROCEDURE — 99232 SBSQ HOSP IP/OBS MODERATE 35: CPT

## 2023-11-04 RX ADMIN — PRAZOSIN HYDROCHLORIDE 1 MG: 1 CAPSULE ORAL at 21:43

## 2023-11-04 RX ADMIN — ALBUTEROL SULFATE 2 PUFF: 108 INHALANT RESPIRATORY (INHALATION) at 06:22

## 2023-11-04 RX ADMIN — TRAMADOL HYDROCHLORIDE 50 MG: 50 TABLET, COATED ORAL at 06:23

## 2023-11-04 RX ADMIN — QUETIAPINE 200 MG: 50 TABLET, FILM COATED, EXTENDED RELEASE ORAL at 21:43

## 2023-11-04 RX ADMIN — ERGOCALCIFEROL 50000 UNITS: 1.25 CAPSULE, LIQUID FILLED ORAL at 09:15

## 2023-11-04 RX ADMIN — ALBUTEROL SULFATE 2 PUFF: 108 INHALANT RESPIRATORY (INHALATION) at 14:20

## 2023-11-04 RX ADMIN — ASPIRIN 81 MG: 81 TABLET, COATED ORAL at 09:15

## 2023-11-04 RX ADMIN — HYDROXYZINE HYDROCHLORIDE 25 MG: 25 TABLET ORAL at 14:20

## 2023-11-04 RX ADMIN — LORATADINE 10 MG: 10 TABLET ORAL at 09:15

## 2023-11-04 RX ADMIN — PREGABALIN 100 MG: 100 CAPSULE ORAL at 21:43

## 2023-11-04 RX ADMIN — PANTOPRAZOLE SODIUM 40 MG: 40 TABLET, DELAYED RELEASE ORAL at 06:09

## 2023-11-04 RX ADMIN — SERTRALINE HYDROCHLORIDE 125 MG: 25 TABLET ORAL at 09:15

## 2023-11-04 RX ADMIN — CLONAZEPAM 1 MG: 1 TABLET ORAL at 09:15

## 2023-11-04 RX ADMIN — TRAMADOL HYDROCHLORIDE 50 MG: 50 TABLET, COATED ORAL at 16:29

## 2023-11-04 RX ADMIN — CLONAZEPAM 1 MG: 1 TABLET ORAL at 21:43

## 2023-11-04 RX ADMIN — ACETAMINOPHEN 975 MG: 325 TABLET ORAL at 09:41

## 2023-11-04 RX ADMIN — ATORVASTATIN CALCIUM 40 MG: 40 TABLET, FILM COATED ORAL at 16:29

## 2023-11-04 RX ADMIN — Medication 6 MG: at 21:43

## 2023-11-04 RX ADMIN — PREGABALIN 100 MG: 100 CAPSULE ORAL at 16:29

## 2023-11-04 RX ADMIN — PREGABALIN 100 MG: 100 CAPSULE ORAL at 09:15

## 2023-11-04 NOTE — PROGRESS NOTES
11/04/23 1420   Allen Anxiety Scale   Anxious Mood 2   Tension 2   Fears 1   Insomnia 0   Intellectual 2   Depressed Mood 2   Somatic Complaints: Muscular 2   Somatic Complaints: Sensory 0   Cardiovascular Symptoms 0   Respiratory Symptoms 2   Gastrointestinal Symptoms 0   Genitourinary Symptoms 0   Autonomic Symptoms 1   Behavior at Interview 1   Allen Anxiety Score 15     Patient at nurse's station and is requesting PRN medication for anxiety. She reports racing thoughts about what is going on in her life at this time. She was attempting to nap and it was unhelpful in reducing her anxiety. She is reporting SOB and is also requesting abluterol inhaler. Patient was educated about the effects of albuterol on her anxiety. Verbalized understanding. 25mg hydroxyzine administered at 1420 for mild anxiety.

## 2023-11-04 NOTE — NURSING NOTE
Patient presents with flat and depressed affect. She does endorse moderate depression and anxiety today. Denies hallucinations and suicidal thoughts. She is withdrawn to her room and naps frequently. She is fixated on her pain and uses many PRNs to help keep her pain controlled. Compliant with all medications. Gets around independently. Able to make needs known.

## 2023-11-04 NOTE — NURSING NOTE
Patient visible on the unit. Pleasant and cooperative. Social with peers. Denies SI, HI, hallucinations. Endorses mild anxiety and depression. Compliant with medications. Able to make needs known. Q 7 minute checks maintained. Will continue to monitor and access.

## 2023-11-04 NOTE — PROGRESS NOTES
Progress Note - Samantha Jacinto 59 y.o. female MRN: [id number]    Unit/Bed#: [facility] Pop Encounter: [id number]        Subjective:   Patient seen and examined at bedside after reviewing the chart and discussing the case with the caring staff. Patient examined at bedside. Patient reports no acute symptoms. Physical Exam   Vitals: Blood pressure 92/54, pulse 67, temperature 97.5 °F (36.4 °C), temperature source Temporal, resp. rate 16, height 5' 1" (1.549 m), weight 85 kg (187 lb 6.4 oz), SpO2 98 %, not currently breastfeeding. ,Body mass index is 35.41 kg/m². Constitutional: Patient in no acute distress. HEENT: PERR, EOMI, MMM. Cardiovascular: Normal rate and regular rhythm. Pulmonary/Chest: Effort normal and breath sounds normal, slight expiratory wheeze. Abdomen: Soft, + BS, NT. Skin: Trace bilateral edema, no erythema, no warmth, no calf tenderness. Assessment/Plan:  Samantha Jacinto is a(n) 59 y.o. female with panic disorder. 1. Cardiac with hx HTN, HLD. Continue amlodipine 10 mg daily, atorvastatin 40 mg daily, ASA 81 mg daily. 2. Tardive dyskinesia. Home valbenazine tosylate 40 mg daily nonformulary. 3. Iron deficiency anemia. Patient is on ferrous sulfate 324 mg every other day. 4. DJD/OA/chronic back pain. Continue Lyrica 100 mg 3 times daily. Tylenol for mild/mod pain. Tramadol q6h prn for severe pain, Robaxin and Voltaren gel prn.  5. Gait abnormality. PT OT consulted for further evaluation. 6. GERD. Patient is on Protonix 40 mg daily. 7. Vitamin D deficiency. Patient started on vitamin D2 50,000 units weekly for 8 weeks followed by vitamin D3 1000 units daily. 8. Vitamin B12 deficiency. Patient started on vitamin B-12 monthly injections. 9. Allergic rhinitis/itching. Started on Claritin 10 mg daily 10/25/23. Flonase and benadryl cream prn. 10. Wheezing. Albuterol inhaler prn. 11. Leg swelling. Compression socks ordered. Encouraged to elevate extremities. Continue to monitor.

## 2023-11-04 NOTE — PROGRESS NOTES
11/04/23 1041   Pain Assessment Post Intervention   Reason Not Indicated Sleeping / Easy to arouse   Post Intervention POSS S   Response to Interventions Patient resting in bed, appears comfortable     Medication seemingly effective.

## 2023-11-04 NOTE — PLAN OF CARE
Problem: Ineffective Coping  Goal: Free from restraint events  Description: - Utilize least restrictive measures   - Provide behavioral interventions   - Redirect inappropriate behaviors   Outcome: Progressing     Problem: Depression  Goal: Refrain from harming self  Description: Interventions:  - Monitor patient closely, per order   - Supervise medication ingestion, monitor effects and side effects   Outcome: Progressing  Goal: Refrain from isolation  Description: Interventions:  - Develop a trusting relationship   - Encourage socialization   Outcome: Progressing

## 2023-11-04 NOTE — PROGRESS NOTES
Progress Note - Behavioral Health   Samantha QUINONEZ Kim Ha 59 y.o. female MRN: [id number]  Unit/Bed#: [facility] Encounter: [id number]    Assessment/Plan   Principal Problem:    Generalized anxiety disorder with panic attacks  Active Problems:    Post traumatic stress disorder    Depression      Subjective:Patient was seen today for continuation of care, records reviewed and  patient was discussed with the morning case review team.  No behavioral changes over the past 24 hours, endorses moderate anxiety levels regarding her discharge, possible [facility] placement but planning remains ongoing. Reports having a dream of her ex- last evening which increased her anxiety, she utilizes as needed Atarax with mild effectiveness. Encouraged interaction in the milieu and attendance in group therapy. Reports adequate sleep and appetite. Patient denies endorsing any suicidal or homicidal ideation. Remains medication compliance. Denies any side effects from medications.     Psychiatric Review of Systems:    Sleep: normal  Appetite: normal  Medication side effects: No   ROS: all other systems are negative    Vitals:  Vitals:    11/04/23 0740   BP: 92/54   Pulse: 67   Resp: 16   Temp: 97.5 °F (36.4 °C)   SpO2: 98%       Mental Status Evaluation:    Appearance:  age appropriate, casually dressed   Behavior:  cooperative, calm   Speech:  dysarthric   Mood:  anxious   Affect:  constricted   Thought Process:  coherent, goal directed   Associations: concrete associations   Thought Content:  negative thinking   Perceptual Disturbances: no auditory hallucinations, no visual hallucinations   Risk Potential: Suicidal ideation - None at present  Homicidal ideation - None at present  Potential for aggression - No   Sensorium:  oriented to person, place, and time/date   Memory:  recent and remote memory grossly intact   Consciousness:  alert and awake   Attention: attention span and concentration appear shorter than expected for age Insight:  limited   Judgment: limited   Gait/Station: uses walker   Motor Activity: abnormal movement noted: involuntary face movements present     Laboratory results:  I have personally reviewed all pertinent laboratory/tests results.   Most Recent Labs:   Lab Results   Component Value Date    WBC 3.39 (L) 10/19/2023    RBC 4.08 10/19/2023    HGB 12.9 10/19/2023    HCT 38.6 10/19/2023     10/19/2023    RDW 13.4 10/19/2023    TOTANEUTABS 0.83 (L) 05/25/2023    NEUTROABS 1.17 (L) 10/19/2023    SODIUM 139 10/19/2023    K 3.6 10/19/2023     10/19/2023    CO2 28 10/19/2023    BUN 11 10/19/2023    CREATININE 0.66 10/19/2023    GLUC 86 10/19/2023    GLUF 86 10/19/2023    CALCIUM 8.7 10/19/2023    AST 24 10/18/2023    ALT 27 10/18/2023    ALKPHOS 102 10/18/2023    TP 6.2 (L) 10/18/2023    ALB 4.3 10/18/2023    TBILI 0.76 10/18/2023    CHOLESTEROL 129 11/03/2023    HDL 63 11/03/2023    TRIG 44 11/03/2023    LDLCALC 57 11/03/2023    NONHDLC 66 11/03/2023    LITHIUM <0.1 (L) 03/23/2023    AMMONIA <10 (L) 06/27/2021    [id number] 0.765 10/18/2023    FREET4 0.80 02/13/2020    PREGSERUM Negative 02/22/2014    RPR Non-Reactive 06/29/2021    HGBA1C 4.5 07/19/2023    EAG 82 07/19/2023         Recommended Treatment:     -Continue current medications, discharge plan ongoing with possible [facility] placement      All current active medications have been reviewed  Encourage group therapy, milieu therapy and occupational therapy  Continue treatment with group therapy, milieu therapy and occupational therapy  Behavioral Health checks every 7 minutes  Continue current medications:  Current Facility-Administered Medications   Medication Dose Route Frequency Provider Last Rate    acetaminophen  650 mg Oral Q4H PRN Martha Braden PA-C      acetaminophen  975 mg Oral Q6H PRN Martha L Dagnall, PA-C      albuterol  2 puff Inhalation Q4H PRN Martha Braden PA-C      aluminum-magnesium hydroxide-simethicone  30 mL Oral Q4H PRN Carmen Lozano MD      amLODIPine  10 mg Oral Daily Carmen Lozano MD      aspirin  81 mg Oral Daily Martha Braden PA-C      atorvastatin  40 mg Oral Daily With Dinner Carmen Lozano MD      [START ON 12/10/2023] cholecalciferol  1,000 Units Oral Daily ALISON WrenC      clonazePAM  1 mg Oral HS Everrett Shaper Prayson, DO      clonazePAM  1 mg Oral Daily Annabelle Brock PA-C      cyanocobalamin  1,000 mcg Intramuscular Q30 Days SHAKILA Wren-C      Diclofenac Sodium  2 g Topical 4x Daily PRN SHAKILA rWen-C      diphenhydrAMINE-zinc acetate   Topical TID PRN Martha Braden PA-C      ergocalciferol  50,000 Units Oral Weekly SHAKILA Wren-C      ferrous sulfate  325 mg Oral Every Other Day ALISON WrenC      fluticasone  2 spray Each Nare Daily PRN Martha Braden PA-C      haloperidol lactate  5 mg Intramuscular Q4H PRN Max 4/day Carmen Lozano MD      hydrOXYzine HCL  25 mg Oral Q6H PRN Max 4/day Carmen Lozano MD      influenza vaccine  0.5 mL Intramuscular Prior to discharge Ashley Escalona MD      loratadine  10 mg Oral Daily Martha Braden PA-C      LORazepam  1 mg Intramuscular Q6H PRN Max 3/day Carmen Lozano MD      melatonin  6 mg Oral HS Rudy A Prayson, DO      nicotine polacrilex  4 mg Oral Q2H PRN Carmen Lozano MD      pantoprazole  40 mg Oral Early Morning Carmen Lozano MD      polyethylene glycol  17 g Oral Daily PRN ALISON WrenC      prazosin  1 mg Oral HS Rudy A Prayson, DO      pregabalin  100 mg Oral TID Carmen Lozano MD      QUEtiapine  200 mg Oral HS Rudy A Prayson, DO      risperiDONE  0.25 mg Oral Q4H PRN Max 6/day Carmen Lozano MD      risperiDONE  0.5 mg Oral Q4H PRN Max 3/day Carmen Lozano MD      risperiDONE  1 mg Oral Q2H PRN Max 3/day Carmen Lozano MD      sertraline  125 mg Oral Daily Andriette Balder Stives, PA-C      traMADol  50 mg Oral Q6H PRN Ashley Escalona MD Risks / Benefits of Treatment:     Risks, benefits, and possible side effects of medications explained to patient. Patient has limited understanding of risks and benefits of treatment at this time, but agrees to take medications as prescribed. Counseling / Coordination of Care:     Patient's progress reviewed with nursing staff. Medications, treatment progress and treatment plan reviewed with patient. Supportive counseling provided to the patient.           MERCY Wilkerson

## 2023-11-04 NOTE — PROGRESS NOTES
11/04/23 1729   Pain Assessment Post Intervention   Pain Assessment Tool Used: 0-10   Post Intervention Pain Score 7   Post Intervention Pain Location/Orientation Orientation: Bilateral;Location: Leg;Location: Back   Post Intervention POSS 1   Response to Interventions Patient verbalized some relief     Medication effective.

## 2023-11-05 PROCEDURE — 99232 SBSQ HOSP IP/OBS MODERATE 35: CPT

## 2023-11-05 RX ORDER — HYDROXYZINE HYDROCHLORIDE 25 MG/1
25 TABLET, FILM COATED ORAL DAILY
Status: DISCONTINUED | OUTPATIENT
Start: 2023-11-06 | End: 2023-11-07

## 2023-11-05 RX ADMIN — CLONAZEPAM 1 MG: 1 TABLET ORAL at 08:44

## 2023-11-05 RX ADMIN — ALBUTEROL SULFATE 2 PUFF: 108 INHALANT RESPIRATORY (INHALATION) at 08:49

## 2023-11-05 RX ADMIN — Medication 6 MG: at 21:54

## 2023-11-05 RX ADMIN — PANTOPRAZOLE SODIUM 40 MG: 40 TABLET, DELAYED RELEASE ORAL at 06:18

## 2023-11-05 RX ADMIN — QUETIAPINE 200 MG: 50 TABLET, FILM COATED, EXTENDED RELEASE ORAL at 21:54

## 2023-11-05 RX ADMIN — LORATADINE 10 MG: 10 TABLET ORAL at 08:44

## 2023-11-05 RX ADMIN — HYDROXYZINE HYDROCHLORIDE 25 MG: 25 TABLET ORAL at 12:50

## 2023-11-05 RX ADMIN — AMLODIPINE BESYLATE 10 MG: 10 TABLET ORAL at 08:44

## 2023-11-05 RX ADMIN — PREGABALIN 100 MG: 100 CAPSULE ORAL at 08:44

## 2023-11-05 RX ADMIN — PREGABALIN 100 MG: 100 CAPSULE ORAL at 21:54

## 2023-11-05 RX ADMIN — PREGABALIN 100 MG: 100 CAPSULE ORAL at 17:06

## 2023-11-05 RX ADMIN — ATORVASTATIN CALCIUM 40 MG: 40 TABLET, FILM COATED ORAL at 17:06

## 2023-11-05 RX ADMIN — ASPIRIN 81 MG: 81 TABLET, COATED ORAL at 08:45

## 2023-11-05 RX ADMIN — FERROUS SULFATE TAB 325 MG (65 MG ELEMENTAL FE) 325 MG: 325 (65 FE) TAB at 08:43

## 2023-11-05 RX ADMIN — ACETAMINOPHEN 975 MG: 325 TABLET ORAL at 12:50

## 2023-11-05 RX ADMIN — CLONAZEPAM 1 MG: 1 TABLET ORAL at 21:54

## 2023-11-05 RX ADMIN — PRAZOSIN HYDROCHLORIDE 1 MG: 1 CAPSULE ORAL at 21:54

## 2023-11-05 RX ADMIN — SERTRALINE HYDROCHLORIDE 125 MG: 25 TABLET ORAL at 08:43

## 2023-11-05 NOTE — PROGRESS NOTES
Progress Note - Behavioral Health   Samantha QUINONEZ Maddie Appiah 59 y.o. female MRN: [id number]  Unit/Bed#: [facility] Encounter: [id number]    Assessment/Plan   Principal Problem:    Generalized anxiety disorder with panic attacks  Active Problems:    Post traumatic stress disorder    Depression      Subjective:Patient was seen today for continuation of care, records reviewed and  patient was discussed with the morning case review team. No behavioral changes over the past 24 hours, endorses ongoing moderate anxiety and depressive symptoms. Appears flat, depressed, withdrawn. Received as needed Atarax with mild effectiveness for anxiety. Zoloft titrated to 150 mg daily today, tolerating with no side effects. Reports adequate sleep and appetite. Patient denies endorsing any suicidal or homicidal ideation. Remains medication compliance. Denies any side effects from medications.     Psychiatric Review of Systems:    Sleep: slept off and on  Appetite: normal  Medication side effects: No   ROS: all other systems are negative    Vitals:  Vitals:    11/05/23 0703   BP: 117/66   Pulse: 65   Resp: 16   Temp: (!) 96.1 °F (35.6 °C)   SpO2: 97%       Mental Status Evaluation:    Appearance:  age appropriate, casually dressed   Behavior:  cooperative, calm, restless   Speech:  dysarthric   Mood:  depressed, anxious   Affect:  constricted   Thought Process:  coherent, goal directed   Associations: concrete associations   Thought Content:  negative thinking   Perceptual Disturbances: no auditory hallucinations, no visual hallucinations   Risk Potential: Suicidal ideation - None at present  Homicidal ideation - None at present  Potential for aggression - No   Sensorium:  oriented to person, place, and time/date   Memory:  recent and remote memory grossly intact   Consciousness:  alert and awake   Attention: attention span and concentration appear shorter than expected for age   Insight:  limited   Judgment: limited   Gait/Station: uses walker   Motor Activity: abnormal movement noted: involuntary face movements present     Laboratory results:  I have personally reviewed all pertinent laboratory/tests results.   Most Recent Labs:   Lab Results   Component Value Date    WBC 3.39 (L) 10/19/2023    RBC 4.08 10/19/2023    HGB 12.9 10/19/2023    HCT 38.6 10/19/2023     10/19/2023    RDW 13.4 10/19/2023    TOTANEUTABS 0.83 (L) 05/25/2023    NEUTROABS 1.17 (L) 10/19/2023    SODIUM 139 10/19/2023    K 3.6 10/19/2023     10/19/2023    CO2 28 10/19/2023    BUN 11 10/19/2023    CREATININE 0.66 10/19/2023    GLUC 86 10/19/2023    GLUF 86 10/19/2023    CALCIUM 8.7 10/19/2023    AST 24 10/18/2023    ALT 27 10/18/2023    ALKPHOS 102 10/18/2023    TP 6.2 (L) 10/18/2023    ALB 4.3 10/18/2023    TBILI 0.76 10/18/2023    CHOLESTEROL 129 11/03/2023    HDL 63 11/03/2023    TRIG 44 11/03/2023    LDLCALC 57 11/03/2023    NONHDLC 66 11/03/2023    LITHIUM <0.1 (L) 03/23/2023    AMMONIA <10 (L) 06/27/2021    [id number] 0.765 10/18/2023    FREET4 0.80 02/13/2020    PREGSERUM Negative 02/22/2014    RPR Non-Reactive 06/29/2021    HGBA1C 4.5 07/19/2023    EAG 82 07/19/2023         Recommended Treatment:     -Titrate Zoloft to 150 mg daily for ongoing depression and anxiety symptoms.  - Discharge plan ongoing with possible [facility] placement      All current active medications have been reviewed  Encourage group therapy, milieu therapy and occupational therapy  Continue treatment with group therapy, milieu therapy and occupational therapy  Behavioral Health checks every 7 minutes  Continue current medications:  Current Facility-Administered Medications   Medication Dose Route Frequency Provider Last Rate    acetaminophen  650 mg Oral Q4H PRN Martha L Dagnall, PA-C      acetaminophen  975 mg Oral Q6H PRN Martha Braden PA-C      albuterol  2 puff Inhalation Q4H PRN Martha Braden PA-C      aluminum-magnesium hydroxide-simethicone  30 mL Oral Q4H PRN Kuldip Grey MD      amLODIPine  10 mg Oral Daily Kuldip Grey MD      aspirin  81 mg Oral Daily Martha Braden PA-C      atorvastatin  40 mg Oral Daily With Dinner Kuldip Grey MD      [START ON 12/10/2023] cholecalciferol  1,000 Units Oral Daily Martha Braden PA-C      clonazePAM  1 mg Oral HS Bamberg Millin Prayson, DO      clonazePAM  1 mg Oral Daily Fleion Brock, JACQUI      cyanocobalamin  1,000 mcg Intramuscular Q30 Days Martha Braden PA-C      Diclofenac Sodium  2 g Topical 4x Daily PRN Martha Braden PA-C      diphenhydrAMINE-zinc acetate   Topical TID PRN Martha Braden PA-C      ergocalciferol  50,000 Units Oral Weekly Martha Braden PA-C      ferrous sulfate  325 mg Oral Every Other Day Martha Braden PA-C      fluticasone  2 spray Each Nare Daily PRN Martha Braden PA-C      haloperidol lactate  5 mg Intramuscular Q4H PRN Max 4/day Kuldip Grey MD      hydrOXYzine HCL  25 mg Oral Q6H PRN Max 4/day Kuldip Grey MD      influenza vaccine  0.5 mL Intramuscular Prior to discharge Marina Trujillo MD      loratadine  10 mg Oral Daily Martha Braden PA-C      LORazepam  1 mg Intramuscular Q6H PRN Max 3/day Kuldip Grey MD      melatonin  6 mg Oral HS Rudy A Prayson, DO      nicotine polacrilex  4 mg Oral Q2H PRN Kuldip Grey MD      pantoprazole  40 mg Oral Early Morning Kuldip Grey MD      polyethylene glycol  17 g Oral Daily PRN Martha Braden PA-C      prazosin  1 mg Oral HS Rudy A Prayson, DO      pregabalin  100 mg Oral TID Kuldip Grey MD      QUEtiapine  200 mg Oral HS Rudy A Prayson, DO      risperiDONE  0.25 mg Oral Q4H PRN Max 6/day Kuldip Grey MD      risperiDONE  0.5 mg Oral Q4H PRN Max 3/day Kuldip Grey MD      risperiDONE  1 mg Oral Q2H PRN Max 3/day Kuldip Grey MD      sertraline  125 mg Oral Daily Hank Brock PA-C      traMADol  50 mg Oral Q6H PRN Marina Trujillo MD Risks / Benefits of Treatment:     Risks, benefits, and possible side effects of medications explained to patient. Patient has limited understanding of risks and benefits of treatment at this time, but agrees to take medications as prescribed. Counseling / Coordination of Care:     Patient's progress reviewed with nursing staff. Medications, treatment progress and treatment plan reviewed with patient. Supportive counseling provided to the patient.           MERCY Tabares

## 2023-11-05 NOTE — PROGRESS NOTES
Progress Note - Samantha Trujillo 59 y.o. female MRN: [id number]    Unit/Bed#: [facility] Encounter: [id number]        Subjective:   Patient seen and examined at bedside after reviewing the chart and discussing the case with the caring staff. Patient examined at bedside. Patient reports no acute symptoms except headache earlier. Physical Exam   Vitals: Blood pressure 117/66, pulse 65, temperature (!) 96.1 °F (35.6 °C), temperature source Temporal, resp. rate 16, height 5' 1" (1.549 m), weight 85 kg (187 lb 6.4 oz), SpO2 97 %, not currently breastfeeding. ,Body mass index is 35.41 kg/m². Constitutional: Patient in no acute distress. HEENT: PERR, EOMI, MMM. Cardiovascular: Normal rate and regular rhythm. Pulmonary/Chest: Effort normal and breath sounds normal, slight expiratory wheeze. Abdomen: Soft, + BS, NT. Skin: Trace bilateral edema, no erythema, no warmth, no calf tenderness. Assessment/Plan:  Samantha Trujillo is a(n) 59 y.o. female with panic disorder. 1. Cardiac with hx HTN, HLD. Continue amlodipine 10 mg daily, atorvastatin 40 mg daily, ASA 81 mg daily. 2. Tardive dyskinesia. Home valbenazine tosylate 40 mg daily nonformulary. 3. Iron deficiency anemia. Patient is on ferrous sulfate 324 mg every other day. 4. DJD/OA/chronic back pain. Continue Lyrica 100 mg 3 times daily. Tylenol for mild/mod pain. Tramadol q6h prn for severe pain, Robaxin and Voltaren gel prn.  5. Gait abnormality. PT OT consulted for further evaluation. 6. GERD. Patient is on Protonix 40 mg daily. 7. Vitamin D deficiency. Patient started on vitamin D2 50,000 units weekly for 8 weeks followed by vitamin D3 1000 units daily. 8. Vitamin B12 deficiency. Patient started on vitamin B-12 monthly injections. 9. Allergic rhinitis/itching. Started on Claritin 10 mg daily 10/25/23. Flonase and benadryl cream prn. 10. Wheezing. Albuterol inhaler prn. 11. Leg swelling. Compression socks ordered.  Encouraged to elevate extremities. Continue to monitor.

## 2023-11-05 NOTE — NURSING NOTE
Patient visible on the unit. Pleasant and cooperative. Social with peers. Compliant with medications. Endorses mild to moderate anxiety and depression. Denies SI, HI, hallucinations. Continuous rounding maintained. Will continue to monitor and access.

## 2023-11-05 NOTE — NURSING NOTE
Patient visible in milieu, pleasant and cooperative in interaction. Social with staff and peers. Patient endorses mild anxiety/depression, denies SI/HI, hallucinations. Remains medication compliant and on 7" checks for safety and behaviors. B - Abdomen soft, non-tender, non-distended with + bowel sounds x 4. No N/V. L - Lungs CTA, no chest pain/SOB. No cough/congestion. E - Trace edema bilateral feet. P - Positive pedal pulses, brisk capillary refill. S - Skin intact, no erythema, ecchymosis. Trace edema noted to bilateral feet.

## 2023-11-05 NOTE — NURSING NOTE
Patient appears to be resting quietly in bed, no anxiety noted S/P PRN Atarax. Remains on 7" checks for safety and behaviors.

## 2023-11-05 NOTE — NURSING NOTE
Patient C/O mild anxiety and headache, requesting PRN Tylenol and medication for anxiety. Patient stated her anxiety is dependent upon the time of day otherwise unable to identify exact cause. PRN Tylenol and Atarax given for same, patient returned to room to rest in bed. Remains on 7" checks for safety and behaviors.

## 2023-11-06 PROCEDURE — 97110 THERAPEUTIC EXERCISES: CPT

## 2023-11-06 PROCEDURE — 97530 THERAPEUTIC ACTIVITIES: CPT

## 2023-11-06 PROCEDURE — 99232 SBSQ HOSP IP/OBS MODERATE 35: CPT

## 2023-11-06 PROCEDURE — 97116 GAIT TRAINING THERAPY: CPT

## 2023-11-06 RX ORDER — CLONAZEPAM 1 MG/1
1 TABLET ORAL 2 TIMES DAILY
Status: DISCONTINUED | OUTPATIENT
Start: 2023-11-06 | End: 2023-11-10 | Stop reason: HOSPADM

## 2023-11-06 RX ADMIN — SERTRALINE HYDROCHLORIDE 150 MG: 50 TABLET ORAL at 08:31

## 2023-11-06 RX ADMIN — AMLODIPINE BESYLATE 10 MG: 10 TABLET ORAL at 08:30

## 2023-11-06 RX ADMIN — HYDROXYZINE HYDROCHLORIDE 25 MG: 25 TABLET ORAL at 13:26

## 2023-11-06 RX ADMIN — PRAZOSIN HYDROCHLORIDE 1 MG: 1 CAPSULE ORAL at 21:25

## 2023-11-06 RX ADMIN — QUETIAPINE 200 MG: 50 TABLET, FILM COATED, EXTENDED RELEASE ORAL at 21:25

## 2023-11-06 RX ADMIN — TRAMADOL HYDROCHLORIDE 50 MG: 50 TABLET, COATED ORAL at 17:38

## 2023-11-06 RX ADMIN — Medication 6 MG: at 21:24

## 2023-11-06 RX ADMIN — CLONAZEPAM 1 MG: 1 TABLET ORAL at 21:24

## 2023-11-06 RX ADMIN — ASPIRIN 81 MG: 81 TABLET, COATED ORAL at 08:30

## 2023-11-06 RX ADMIN — CLONAZEPAM 1 MG: 1 TABLET ORAL at 08:30

## 2023-11-06 RX ADMIN — ATORVASTATIN CALCIUM 40 MG: 40 TABLET, FILM COATED ORAL at 16:07

## 2023-11-06 RX ADMIN — PANTOPRAZOLE SODIUM 40 MG: 40 TABLET, DELAYED RELEASE ORAL at 06:18

## 2023-11-06 RX ADMIN — PREGABALIN 100 MG: 100 CAPSULE ORAL at 21:25

## 2023-11-06 RX ADMIN — LORATADINE 10 MG: 10 TABLET ORAL at 08:31

## 2023-11-06 RX ADMIN — PREGABALIN 100 MG: 100 CAPSULE ORAL at 08:31

## 2023-11-06 RX ADMIN — PREGABALIN 100 MG: 100 CAPSULE ORAL at 16:07

## 2023-11-06 NOTE — PLAN OF CARE
Problem: OCCUPATIONAL THERAPY ADULT  Goal: Performs self-care activities at highest level of function for planned discharge setting. See evaluation for individualized goals. Description: Treatment Interventions: ADL retraining, UE strengthening/ROM, Functional transfer training, Endurance training, Patient/family training, Energy conservation, Activityengagement          See flowsheet documentation for full assessment, interventions and recommendations. Outcome: Progressing  Note: Limitation: Decreased ADL status, Decreased UE strength, Decreased Safe judgement during ADL, Decreased endurance, Decreased high-level ADLs  Prognosis: Good  Assessment: Patient participated in Skilled OT session this date with interventions consisting of functional transfer training, Energy Conservation techniques and therapeutic exercise to: increase functional use of BUEs, increase BUE muscle strength  . Patient agreeable to OT treatment session, upon arrival patient was found seated OOB to Chair, alert, responsive , and in no apparent distress. Patient transfers sit to stand with Mod I. Patient then ambulates 125 feet with S x 1. Patient then performs UB TE using 1.5# weight as detailed in flow sheet. Session ended with patient seated OOB to chair and all needs met. In comparison to previous session, patient with improvements in UB strength, endurance, and functional transfers. Patient requiring verbal cues for safety, verbal cues for correct technique, verbal cues for pacing thru activity steps, and frequent rest periods. Patient performance  demonstrated good carryover of learned techniques and strategies to facilitate safety during functional tasks. Patient continues to be functioning below baseline level, occupational performance remains limited secondary to factors listed above and increased risk for falls and injury. From OT standpoint, recommendation at time of d/c would be Short Term Rehab.   Patient to benefit from continued Occupational Therapy treatment while in the hospital to address deficits as defined above and maximize level of functional independence with ADLs and functional mobility in order to return to PLOF.      Rehab Resource Intensity Level, OT: III (Minimum Resource Intensity)

## 2023-11-06 NOTE — PHYSICAL THERAPY NOTE
PHYSICAL THERAPY TREATMENT NOTE  NAME:  Samantha Kenny  DATE: 11/06/23    Length Of Stay: 18  Performed at least 2 patient identifiers during session: Name and ID bracelet    TREATMENT:      11/06/23 1234   PT Last Visit   PT Visit Date 11/06/23   Note Type   Note Type Treatment   Pain Assessment   Pain Assessment Tool 0-10   Pain Score 5   Pain Location/Orientation Orientation: Bilateral;Location: Buttocks; Location: Leg   Pain Onset/Description Onset: Ongoing   Patient's Stated Pain Goal No pain   Hospital Pain Intervention(s) Medication (See MAR); Repositioned; Ambulation/increased activity; Emotional support; Rest   Restrictions/Precautions   Weight Bearing Precautions Per Order No   Other Precautions Fall Risk;Cognitive   General   Chart Reviewed Yes   Family/Caregiver Present No   Cognition   Overall Cognitive Status Impaired   Arousal/Participation Alert; Responsive; Cooperative   Attention Within functional limits   Orientation Level Oriented X4   Memory Decreased recall of recent events   Following Commands Follows one step commands without difficulty   Comments pt agreeable to PT session   Transfers   Sit to Stand 6  Modified independent   Additional items Armrests; Increased time required;Verbal cues   Stand to Sit 6  Modified independent   Additional items Armrests; Increased time required;Verbal cues   Stand pivot 6  Modified independent   Additional items Increased time required;Verbal cues  (RW)   Additional Comments RW utilized for functional transfers   Ambulation/Elevation   Gait pattern Improper Weight shift; Forward Flexion;Decreased foot clearance; Wide CALLY   Gait Assistance 6  Modified independent   Assistive Device Rolling walker   Distance 250 ftx1   Stair Management Assistance Not tested   Ambulation/Elevation Additional Comments verbal cues for upright posture   Balance   Static Sitting Normal   Dynamic Sitting Good   Static Standing Fair +   Dynamic Standing Fair   Ambulatory Fair   Endurance Deficit   Endurance Deficit Yes   Activity Tolerance   Activity Tolerance Patient limited by fatigue;Patient limited by pain   Nurse Made Aware yes   Exercises   Quad Sets Sitting;20 reps;AROM; Bilateral   Heelslides Sitting;20 reps;AROM; Bilateral   Glute Sets Sitting;20 reps;AROM; Bilateral   Hip Abduction Sitting;20 reps;AROM; Bilateral   Hip Adduction Sitting;20 reps;AROM; Bilateral   Ankle Pumps Sitting;20 reps;AROM; Bilateral   Marching Sitting;20 reps;AROM; Bilateral   Assessment   Prognosis Good   Problem List Decreased strength;Decreased endurance; Impaired balance;Decreased mobility;Pain   Assessment Pt seen for PT treatment session this date with interventions consisting of gait training w/ emphasis on improving pt's ability to ambulate level surfaces x 250 ftx1 with modified independent provided by therapist with RW, Therapeutic exercise consisting of: AROM 20 reps B LE in sitting position, and therapeutic activity consisting of training: sit<>stand transfers. Pt agreeable to PT treatment session upon arrival, pt found seated OOB in chair, in no apparent distress and responsive. In comparison to previous session, pt with improvements in distance ambulated . Post session: all needs in reach. Continue to recommend Level III (Minimum Resource Intensity) at time of d/c in order to maximize pt's functional independence and safety w/ mobility. Pt continues to be functioning below baseline level. PT will continue to see pt during current hospitalization in order to address the deficits listed above and provide interventions consistent w/ POC in effort to achieve STGs. Estephanie Escalona PTA   Barriers to Discharge Inaccessible home environment;Decreased caregiver support   Goals   STG Expiration Date 11/09/23   Plan   Treatment/Interventions Functional transfer training;LE strengthening/ROM; Therapeutic exercise; Endurance training;Bed mobility;Gait training   Progress Progressing toward goals   PT Frequency (2-5x/wk)   Discharge Recommendation   Rehab Resource Intensity Level, PT III (Minimum Resource Intensity)   AM-PAC Basic Mobility Inpatient   Turning in Flat Bed Without Bedrails 4   Lying on Back to Sitting on Edge of Flat Bed Without Bedrails 4   Moving Bed to Chair 4   Standing Up From Chair Using Arms 4   Walk in Room 4   Climb 3-5 Stairs With Railing 2   Basic Mobility Inpatient Raw Score 22   Basic Mobility Standardized Score 47.4   Highest Level Of Mobility   JH-HLM Goal 7: Walk 25 feet or more   JH-HLM Achieved 8: Walk 250 feet ot more   End of Consult   Patient Position at End of Consult All needs within reach       The patient's AM-PAC Basic Mobility Inpatient Short Form Raw Score is 22. A Raw score of greater than 16 suggests the patient may benefit from discharge to home. Please also refer to the recommendation of the Physical Therapist for safe discharge planning.       Ulysses Gutting, PTA,PTA

## 2023-11-06 NOTE — OCCUPATIONAL THERAPY NOTE
11/06/23 1235   OT Last Visit   OT Visit Date 11/06/23   Note Type   Note Type Treatment   Pain Assessment   Pain Assessment Tool 0-10   Pain Score 5   Pain Location/Orientation Orientation: Bilateral;Location: Leg;Location: Buttocks   Pain Onset/Description Onset: Ongoing   Patient's Stated Pain Goal No pain   Hospital Pain Intervention(s) Medication (See MAR); Ambulation/increased activity;Repositioned; Emotional support   Restrictions/Precautions   Weight Bearing Precautions Per Order No   Other Precautions Cognitive; Fall Risk   Transfers   Sit to Stand 6  Modified independent   Additional items Armrests; Increased time required;Verbal cues   Stand to Sit 6  Modified independent   Additional items Armrests; Increased time required;Verbal cues   Stand pivot 6  Modified independent   Additional items Increased time required;Verbal cues   Functional Mobility   Functional Mobility 5  Supervision   Additional Comments Pt ambulates 125 feet with S x 1 and RW. Additional items Rolling walker   Therapeutic Excerise-Strength   UE Strength Yes   Right Upper Extremity- Strength   R Shoulder Flexion; Extension;Horizontal ABduction  (horiz. add, scap pro/ret)   R Elbow Elbow flexion;Elbow extension   R Wrist   (wrist rotation)   R Position Seated   Equipment Dumbbell   R Weight/Reps/Sets 1.5# weight x 10 reps   Left Upper Extremity-Strength   L Weights/Reps/Sets TE same as R UE above   Cognition   Overall Cognitive Status Impaired   Arousal/Participation Alert; Responsive; Cooperative   Attention Within functional limits   Orientation Level Oriented X4   Memory Decreased recall of recent events   Following Commands Follows one step commands without difficulty   Comments Pt agreeable to OT treatment.    Activity Tolerance   Activity Tolerance Patient tolerated treatment well;Patient limited by fatigue;Patient limited by pain   Assessment   Assessment Patient participated in Skilled OT session this date with interventions consisting of functional transfer training, Energy Conservation techniques and therapeutic exercise to: increase functional use of BUEs, increase BUE muscle strength  . Patient agreeable to OT treatment session, upon arrival patient was found seated OOB to Chair, alert, responsive , and in no apparent distress. Patient transfers sit to stand with Mod I. Patient then ambulates 125 feet with S x 1. Patient then performs UB TE using 1.5# weight as detailed in flow sheet. Session ended with patient seated OOB to chair and all needs met. In comparison to previous session, patient with improvements in UB strength, endurance, and functional transfers. Patient requiring verbal cues for safety, verbal cues for correct technique, verbal cues for pacing thru activity steps, and frequent rest periods. Patient performance  demonstrated good carryover of learned techniques and strategies to facilitate safety during functional tasks. Patient continues to be functioning below baseline level, occupational performance remains limited secondary to factors listed above and increased risk for falls and injury. From OT standpoint, recommendation at time of d/c would be Short Term Rehab. Patient to benefit from continued Occupational Therapy treatment while in the hospital to address deficits as defined above and maximize level of functional independence with ADLs and functional mobility in order to return to [facility]. Plan   Treatment Interventions Functional transfer training;UE strengthening/ROM; Energy conservation   Goal Expiration Date 11/09/23   OT Treatment Day 3   OT Frequency   (2-5x/wk)   Discharge Recommendation   Rehab Resource Intensity Level, OT III (Minimum Resource Intensity)   Additional Comments  The patient's raw score on the AM-PAC Daily Activity Inpatient Short Form is 19. A raw score of greater than or equal to 19 suggests the patient may benefit from discharge to home.  Please refer to the recommendation of the Occupational Therapist for safe discharge planning.    AM-PAC Daily Activity Inpatient   Lower Body Dressing 2   Bathing 3   Toileting 3   Upper Body Dressing 3   Grooming 4   Eating 4   Daily Activity Raw Score 19   Daily Activity Standardized Score (Calc for Raw Score >=11) 40.22   AM-PAC Applied Cognition Inpatient   Following a Speech/Presentation 3   Understanding Ordinary Conversation 4   Taking Medications 3   Remembering Where Things Are Placed or Put Away 3   Remembering List of 4-5 Errands 3   Taking Care of Complicated Tasks 2   Applied Cognition Raw Score 18   Applied Cognition Standardized Score 38.07       Ya Hammonds

## 2023-11-06 NOTE — PROGRESS NOTES
11/06/23    Team Meeting   Meeting Type Daily Rounds   Team Members Present   Team Members Present Physician;Nurse;;Occupational Therapist   Physician Team Member Dr. Krissy Awan MD; [facility]   Nursing Team Member Blu Drummond RN   Care Management Team Member MS Francois, [facility]   OT Team Member Jaime Razo Asp   Patient/Family Present   Patient Present No   Patient's Family Present No   Pleasant and cooperative, prn, medication compliant. Denies si/hi/ah/vh. Awaiting pch placement. Medication adjustment.

## 2023-11-06 NOTE — NURSING NOTE
Patient visible on the unit. Calm and cooperative. Participated in snack. Denies SI, HI, and hallucinations. Endorses mild anxiety and depression. Compliant with medications. Continuous rounding maintained.

## 2023-11-06 NOTE — NURSING NOTE
Pt was visible and social in the milieu. Pt was calm and cooperative. Pt attended group and participated appropriately. Pt endorsed mild anxiety and depression. Pt denied SI/HI/AVH. Pt ambulates independently with a roller walker. Fall precautions were maintained. Q 7 minute safety checks were maintained.

## 2023-11-06 NOTE — PLAN OF CARE
Problem: PHYSICAL THERAPY ADULT  Goal: Performs mobility at highest level of function for planned discharge setting. See evaluation for individualized goals. Description: Treatment/Interventions: Functional transfer training, LE strengthening/ROM, Therapeutic exercise, Endurance training, Elevations, Patient/family training, Equipment eval/education, Bed mobility, Gait training, Spoke to nursing, Spoke to case management  Equipment Recommended:  (pt encouraged to use RW at this time)       See flowsheet documentation for full assessment, interventions and recommendations. Outcome: Progressing  Note: Prognosis: Good  Problem List: Decreased strength, Decreased endurance, Impaired balance, Decreased mobility, Pain  Assessment: Pt seen for PT treatment session this date with interventions consisting of gait training w/ emphasis on improving pt's ability to ambulate level surfaces x 250 ftx1 with modified independent provided by therapist with RW, Therapeutic exercise consisting of: AROM 20 reps B LE in sitting position, and therapeutic activity consisting of training: sit<>stand transfers. Pt agreeable to PT treatment session upon arrival, pt found seated OOB in chair, in no apparent distress and responsive. In comparison to previous session, pt with improvements in distance ambulated . Post session: all needs in reach. Continue to recommend Level III (Minimum Resource Intensity) at time of d/c in order to maximize pt's functional independence and safety w/ mobility. Pt continues to be functioning below baseline level. PT will continue to see pt during current hospitalization in order to address the deficits listed above and provide interventions consistent w/ POC in effort to achieve STGs.     Estephanie Ferguson, PTA  Barriers to Discharge: Inaccessible home environment, Decreased caregiver support     Rehab Resource Intensity Level, PT: III (Minimum Resource Intensity)    See flowsheet documentation for full assessment.

## 2023-11-06 NOTE — PROGRESS NOTES
Progress Note - Samantha Levin 59 y.o. female MRN: [id number]    Unit/Bed#: [facility] Encounter: [id number]        Subjective:   Patient seen and examined at bedside after reviewing the chart and discussing the case with the caring staff. Patient examined at bedside. Patient reports no acute symptoms. Physical Exam   Vitals: Blood pressure 129/91, pulse 82, temperature (!) 97.2 °F (36.2 °C), temperature source Temporal, resp. rate 18, height 5' 1" (1.549 m), weight 85 kg (187 lb 6.4 oz), SpO2 100 %, not currently breastfeeding. ,Body mass index is 35.41 kg/m². Constitutional: Patient in no acute distress. HEENT: PERR, EOMI, MMM. Cardiovascular: Normal rate and regular rhythm. Pulmonary/Chest: Effort normal and breath sounds normal, slight expiratory wheeze. Abdomen: Soft, + BS, NT. Skin: Trace bilateral edema, no erythema, no warmth, no calf tenderness. Assessment/Plan:  Samantha Levin is a(n) 59 y.o. female with panic disorder. 1. Cardiac with hx HTN, HLD. Continue amlodipine 10 mg daily, atorvastatin 40 mg daily, ASA 81 mg daily. 2. Tardive dyskinesia. Home valbenazine tosylate 40 mg daily nonformulary. 3. Iron deficiency anemia. Patient is on ferrous sulfate 324 mg every other day. 4. DJD/OA/chronic back pain. Continue Lyrica 100 mg 3 times daily. Tylenol for mild/mod pain. Tramadol q6h prn for severe pain, Robaxin and Voltaren gel prn.  5. Gait abnormality. PT OT consulted for further evaluation. 6. GERD. Patient is on Protonix 40 mg daily. 7. Vitamin D deficiency. Patient started on vitamin D2 50,000 units weekly for 8 weeks followed by vitamin D3 1000 units daily. 8. Vitamin B12 deficiency. Patient started on vitamin B-12 monthly injections. 9. Allergic rhinitis/itching. Started on Claritin 10 mg daily 10/25/23. Flonase and benadryl cream prn. 10. Wheezing. Albuterol inhaler prn. 11. Leg swelling. Compression socks ordered.  Encouraged to elevate extremities. Continue to monitor.

## 2023-11-06 NOTE — PROGRESS NOTES
Progress Note - Behavioral Health   Samantha Adamson 59 y.o. female MRN: [id number]  Unit/Bed#: [facility] Encounter: [id number]    Assessment/Plan   Principal Problem:    Generalized anxiety disorder with panic attacks  Active Problems:    Post traumatic stress disorder    Depression      Behavior over the last 24 hours:  unchanged  Sleep: normal  Appetite: normal  Medication side effects: No  ROS: no complaints and all other systems are negative    Subjective: Samantha was seen today for psychiatric follow-up. Patient calm, cooperative. She is intermittently visible on the unit visible on the unit, social with select peers. Patient is withdrawn to her room isolative to self. She endorses some anxiety and depression. She is compliant with her current medication regimen, she denied any SI/HI/AVH. She did not appear internally preoccupied. Mental Status Evaluation:  Appearance:  age appropriate and casually dressed   Behavior:  Calm, cooperative   Speech:  dysarthric   Mood:  anxious and depressed   Affect:  mood-incongruent   Thought Process:  goal directed   Associations: intact associations   Thought Content:  Ruminative thoughts   Perceptual Disturbances: Denied AVH, did not appear internally preoccupied   Risk Potential: Suicidal Ideations none at present  Homicidal Ideations none at present  Potential for Aggression No   Sensorium:  person, place, and time/date   Memory:  recent and remote memory grossly intact   Consciousness:  alert and awake    Attention: attention span and concentration were age appropriate   Insight:  limited   Judgment: limited   Gait/Station: In bed   Motor Activity: Dyskinetic facial movement present     Progress Toward Goals: Unchanged. Patient calm, cooperative. She is compliant with her current psychotropic medication regimen. She denied side effects from current psychotropic medication regimen. Will continue current psychotropic medication regimen.  No discharge date at this time.    Recommended Treatment: Continue with group therapy, milieu therapy and occupational therapy. Risks, benefits and possible side effects of Medications:   Risks, benefits, and possible side effects of medications explained to patient and patient verbalizes understanding. Medications: all current active meds have been reviewed. Labs: I have personally reviewed all pertinent laboratory/tests results. Most Recent Labs:   Lab Results   Component Value Date    WBC 3.39 (L) 10/19/2023    RBC 4.08 10/19/2023    HGB 12.9 10/19/2023    HCT 38.6 10/19/2023     10/19/2023    RDW 13.4 10/19/2023    NEUTROABS 1.17 (L) 10/19/2023    SODIUM 139 10/19/2023    K 3.6 10/19/2023     10/19/2023    CO2 28 10/19/2023    BUN 11 10/19/2023    CREATININE 0.66 10/19/2023    GLUC 86 10/19/2023    GLUF 86 10/19/2023    CALCIUM 8.7 10/19/2023    AST 24 10/18/2023    ALT 27 10/18/2023    ALKPHOS 102 10/18/2023    TP 6.2 (L) 10/18/2023    ALB 4.3 10/18/2023    TBILI 0.76 10/18/2023    CHOLESTEROL 129 11/03/2023    HDL 63 11/03/2023    TRIG 44 11/03/2023    LDLCALC 57 11/03/2023    NONHDLC 66 11/03/2023    LITHIUM <0.1 (L) 03/23/2023    AMMONIA <10 (L) 06/27/2021    NEL5INFGOZTE 0.765 10/18/2023    FREET4 0.80 02/13/2020    PREGSERUM Negative 02/22/2014    RPR Non-Reactive 06/29/2021    HGBA1C 4.5 07/19/2023    EAG 82 07/19/2023       Counseling / Coordination of Care  Total floor / unit time spent today 25 minutes.

## 2023-11-07 ENCOUNTER — PATIENT OUTREACH (OUTPATIENT)
Dept: INTERNAL MEDICINE CLINIC | Facility: CLINIC | Age: 58
End: 2023-11-07

## 2023-11-07 PROCEDURE — 97110 THERAPEUTIC EXERCISES: CPT

## 2023-11-07 PROCEDURE — 99232 SBSQ HOSP IP/OBS MODERATE 35: CPT

## 2023-11-07 RX ORDER — HYDROXYZINE HYDROCHLORIDE 25 MG/1
25 TABLET, FILM COATED ORAL ONCE
Status: COMPLETED | OUTPATIENT
Start: 2023-11-07 | End: 2023-11-07

## 2023-11-07 RX ORDER — HYDROXYZINE HYDROCHLORIDE 25 MG/1
25 TABLET, FILM COATED ORAL DAILY
Status: DISCONTINUED | OUTPATIENT
Start: 2023-11-08 | End: 2023-11-10 | Stop reason: HOSPADM

## 2023-11-07 RX ADMIN — CLONAZEPAM 1 MG: 1 TABLET ORAL at 08:33

## 2023-11-07 RX ADMIN — CLONAZEPAM 1 MG: 1 TABLET ORAL at 22:06

## 2023-11-07 RX ADMIN — PREGABALIN 100 MG: 100 CAPSULE ORAL at 16:11

## 2023-11-07 RX ADMIN — QUETIAPINE 200 MG: 50 TABLET, FILM COATED, EXTENDED RELEASE ORAL at 21:55

## 2023-11-07 RX ADMIN — FERROUS SULFATE TAB 325 MG (65 MG ELEMENTAL FE) 325 MG: 325 (65 FE) TAB at 08:32

## 2023-11-07 RX ADMIN — ASPIRIN 81 MG: 81 TABLET, COATED ORAL at 08:33

## 2023-11-07 RX ADMIN — SERTRALINE HYDROCHLORIDE 150 MG: 50 TABLET ORAL at 08:31

## 2023-11-07 RX ADMIN — Medication 6 MG: at 21:56

## 2023-11-07 RX ADMIN — ATORVASTATIN CALCIUM 40 MG: 40 TABLET, FILM COATED ORAL at 16:11

## 2023-11-07 RX ADMIN — PREGABALIN 100 MG: 100 CAPSULE ORAL at 21:54

## 2023-11-07 RX ADMIN — PRAZOSIN HYDROCHLORIDE 1 MG: 1 CAPSULE ORAL at 21:54

## 2023-11-07 RX ADMIN — HYDROXYZINE HYDROCHLORIDE 25 MG: 25 TABLET ORAL at 08:32

## 2023-11-07 RX ADMIN — PREGABALIN 100 MG: 100 CAPSULE ORAL at 08:33

## 2023-11-07 RX ADMIN — HYDROXYZINE HYDROCHLORIDE 25 MG: 25 TABLET ORAL at 13:15

## 2023-11-07 RX ADMIN — LORATADINE 10 MG: 10 TABLET ORAL at 08:33

## 2023-11-07 RX ADMIN — PANTOPRAZOLE SODIUM 40 MG: 40 TABLET, DELAYED RELEASE ORAL at 06:04

## 2023-11-07 RX ADMIN — TRAMADOL HYDROCHLORIDE 50 MG: 50 TABLET, COATED ORAL at 21:10

## 2023-11-07 NOTE — NURSING NOTE
A&Ox4.  Pt was cooperative with staff during care. Compliant with medication. Pt observed on the unit and social with peers. Denied SI, HI, ANDRAE, anxiety, or depression. Speech pattern normal and relaxed. Pt's affect was flat. Pt's eye contact was good. Appearance and hygiene was unremarkable. Made no c/o pain or discomfort. Pt was resting in her room. 7 minute safety checks continued.

## 2023-11-07 NOTE — PROGRESS NOTES
Progress Note - Samantha Varela 59 y.o. female MRN: [id number]    Unit/Bed#: [facility] Encounter: [id number]        Subjective:   Patient seen and examined at bedside after reviewing the chart and discussing the case with the caring staff. Patient examined at bedside. Patient reports no acute symptoms. Physical Exam   Vitals: Blood pressure 96/59, pulse 60, temperature (!) 97.2 °F (36.2 °C), temperature source Temporal, resp. rate 18, height 5' 1" (1.549 m), weight 85 kg (187 lb 6.4 oz), SpO2 98 %, not currently breastfeeding. ,Body mass index is 35.41 kg/m². Constitutional: Patient in no acute distress. HEENT: PERR, EOMI, MMM. Cardiovascular: Normal rate and regular rhythm. Pulmonary/Chest: Effort normal and breath sounds normal, slight expiratory wheeze. Abdomen: Soft, + BS, NT. Skin: Trace bilateral edema, no erythema, no warmth, no calf tenderness. Assessment/Plan:  Samantha Varela is a(n) 59 y.o. female with panic disorder. 1. Cardiac with hx HTN, HLD. Continue amlodipine 10 mg daily, atorvastatin 40 mg daily, ASA 81 mg daily. 2. Tardive dyskinesia. Home valbenazine tosylate 40 mg daily nonformulary. 3. Iron deficiency anemia. Patient is on ferrous sulfate 324 mg every other day. 4. DJD/OA/chronic back pain. Continue Lyrica 100 mg 3 times daily. Tylenol for mild/mod pain. Tramadol q6h prn for severe pain, Robaxin and Voltaren gel prn.  5. Gait abnormality. PT OT consulted for further evaluation. 6. GERD. Patient is on Protonix 40 mg daily. 7. Vitamin D deficiency. Patient started on vitamin D2 50,000 units weekly for 8 weeks followed by vitamin D3 1000 units daily. 8. Vitamin B12 deficiency. Patient started on vitamin B-12 monthly injections. 9. Allergic rhinitis/itching. Started on Claritin 10 mg daily 10/25/23. Flonase and benadryl cream prn. 10. Wheezing. Albuterol inhaler prn. 11. Leg swelling. Compression socks ordered. Encouraged to elevate extremities. Continue to monitor.

## 2023-11-07 NOTE — PROGRESS NOTES
Progress Note - Behavioral Health   Samantha Bridges 59 y.o. female MRN: [id number]  Unit/Bed#: [facility] Encounter: [id number]    Assessment/Plan   Principal Problem:    Generalized anxiety disorder with panic attacks  Active Problems:    Post traumatic stress disorder    Depression      Behavior over the last 24 hours:  unchanged  Sleep: normal  Appetite: normal  Medication side effects: No  ROS: no complaints and all other systems are negative    Subjective: Samantha was seen today for psychiatric follow-up. Patient calm, cooperative. She is more visible on the unit today, social with peers. Patient however remains ruminative and negative in thought, with mild depression anxiety. According to nursing staff patient is compliant with her medication regimen. She denied any sleep disturbance, SI/HI/AVH. She did not appear to responding to internal stimuli. Mental Status Evaluation:  Appearance:  age appropriate and casually dressed   Behavior:  Calm, cooperative   Speech:  dysarthric   Mood:  anxious and depressed   Affect:  mood-incongruent   Thought Process:  goal directed   Associations: intact associations   Thought Content:  Ruminative thoughts   Perceptual Disturbances: Denied AVH, did not appear internally preoccupied   Risk Potential: Suicidal Ideations none at present  Homicidal Ideations none at present  Potential for Aggression No   Sensorium:  person, place, and time/date   Memory:  recent and remote memory grossly intact   Consciousness:  alert and awake    Attention: attention span and concentration were age appropriate   Insight:  limited   Judgment: limited   Gait/Station: Seated in a chair   Motor Activity: Dyskinetic facial movement present     Progress Toward Goals: Unchanged. Patient remains depressed and anxious. She is compliant with her current psychotropic medication regimen. She denied side effects from current psychotropic medication regimen.  Will continue current psychotropic medication regimen. No discharge date at this time. Recommended Treatment: Continue with group therapy, milieu therapy and occupational therapy. Risks, benefits and possible side effects of Medications:   Risks, benefits, and possible side effects of medications explained to patient and patient verbalizes understanding. Medications: all current active meds have been reviewed. Labs: I have personally reviewed all pertinent laboratory/tests results. Most Recent Labs:   Lab Results   Component Value Date    WBC 3.39 (L) 10/19/2023    RBC 4.08 10/19/2023    HGB 12.9 10/19/2023    HCT 38.6 10/19/2023     10/19/2023    RDW 13.4 10/19/2023    NEUTROABS 1.17 (L) 10/19/2023    SODIUM 139 10/19/2023    K 3.6 10/19/2023     10/19/2023    CO2 28 10/19/2023    BUN 11 10/19/2023    CREATININE 0.66 10/19/2023    GLUC 86 10/19/2023    GLUF 86 10/19/2023    CALCIUM 8.7 10/19/2023    AST 24 10/18/2023    ALT 27 10/18/2023    ALKPHOS 102 10/18/2023    TP 6.2 (L) 10/18/2023    ALB 4.3 10/18/2023    TBILI 0.76 10/18/2023    CHOLESTEROL 129 11/03/2023    HDL 63 11/03/2023    TRIG 44 11/03/2023    LDLCALC 57 11/03/2023    NONHDLC 66 11/03/2023    LITHIUM <0.1 (L) 03/23/2023    AMMONIA <10 (L) 06/27/2021    GOA4VBKVQQBW 0.765 10/18/2023    FREET4 0.80 02/13/2020    PREGSERUM Negative 02/22/2014    RPR Non-Reactive 06/29/2021    HGBA1C 4.5 07/19/2023    EAG 82 07/19/2023       Counseling / Coordination of Care  Total floor / unit time spent today 25 minutes.

## 2023-11-07 NOTE — OCCUPATIONAL THERAPY NOTE
11/07/23 1236   OT Last Visit   OT Visit Date 11/07/23   Note Type   Note Type Treatment   Pain Assessment   Pain Assessment Tool 0-10   Pain Score 5   Pain Location/Orientation Orientation: Left; Location: Arm   Pain Onset/Description Onset: Ongoing;Frequency: Constant/Continuous; Descriptor: Sore   Patient's Stated Pain Goal No pain   Hospital Pain Intervention(s) Repositioned; Ambulation/increased activity; Emotional support   Restrictions/Precautions   Weight Bearing Precautions Per Order No   Other Precautions Cognitive; Fall Risk   Therapeutic Excerise-Strength   UE Strength Yes   Right Upper Extremity- Strength   R Shoulder Flexion; Extension;Horizontal ABduction  (horiz add, scap pro/ret)   R Elbow Elbow flexion;Elbow extension   R Wrist   (wrist rotation)   R Position Seated   Equipment Dumbbell   R Weight/Reps/Sets 1.5# weight x 10 reps   Left Upper Extremity-Strength   L Weights/Reps/Sets TE same as R UE above   Cognition   Overall Cognitive Status Impaired   Arousal/Participation Alert; Responsive; Cooperative   Attention Within functional limits   Orientation Level Oriented X4   Memory Decreased recall of recent events   Following Commands Follows one step commands without difficulty   Comments Pt agreeable to OT treatment,   Activity Tolerance   Activity Tolerance Patient tolerated treatment well;Patient limited by pain   Assessment   Assessment Patient participated in Skilled OT session this date with interventions consisting of Energy Conservation techniques and therapeutic exercise to: increase functional use of BUEs, increase BUE muscle strength  . Patient agreeable to OT treatment session, upon arrival patient was found seated OOB to Chair, alert, responsive , and in no apparent distress. Patient tolerated UB TE using 1.5# weight as detailed in flow sheet. Patient declined further activity following TE due to arrival of lunch trays. Session ended with patient seated OOB to chair and all needs met.  In comparison to previous session, patient with improvements in UB strength and endurance. Patient requiring verbal cues for safety, verbal cues for correct technique, and frequent rest periods. Patient performance  demonstrated good carryover of learned techniques and strategies to facilitate safety during functional tasks. Patient continues to be functioning below baseline level, occupational performance remains limited secondary to factors listed above and increased risk for falls and injury. From OT standpoint, recommendation at time of d/c would be Short Term Rehab. Patient to benefit from continued Occupational Therapy treatment while in the hospital to address deficits as defined above and maximize level of functional independence with ADLs and functional mobility in order to return to [facility]. Plan   Treatment Interventions UE strengthening/ROM; Energy conservation   Goal Expiration Date 11/09/23   OT Treatment Day 4   OT Frequency   (2-5x/wk)   Discharge Recommendation   Rehab Resource Intensity Level, OT III (Minimum Resource Intensity)   Additional Comments  The patient's raw score on the AM-PAC Daily Activity Inpatient Short Form is 19. A raw score of greater than or equal to 19 suggests the patient may benefit from discharge to home. Please refer to the recommendation of the Occupational Therapist for safe discharge planning.    AM-PAC Daily Activity Inpatient   Lower Body Dressing 2   Bathing 3   Toileting 3   Upper Body Dressing 3   Grooming 4   Eating 4   Daily Activity Raw Score 19   Daily Activity Standardized Score (Calc for Raw Score >=11) 40.22   AM-PAC Applied Cognition Inpatient   Following a Speech/Presentation 3   Understanding Ordinary Conversation 4   Taking Medications 3   Remembering Where Things Are Placed or Put Away 3   Remembering List of 4-5 Errands 3   Taking Care of Complicated Tasks 2   Applied Cognition Raw Score 18   Applied Cognition Standardized Score 38.07     Js Shen Cassius

## 2023-11-07 NOTE — NURSING NOTE
Patient visible in milieu, pleasant and cooperative in interaction. Social with staff and peers. Patient endorses mild anxiety and depression, denies SI/HI, hallucinations. Affect flat. Remains medication compliant and on 7" checks for safety and behaviors.

## 2023-11-07 NOTE — PROGRESS NOTES
11/07/23    Team Meeting   Meeting Type Daily Rounds   Team Members Present   Team Members Present Physician;Nurse;;Occupational Therapist   Physician Team Member Dr. Haider Martínez MD; [facility]   Nursing Team Member Flash Lyman RN   Care Management Team Member MS Francois, [facility], [facility]   OT Team Member Jaime Cervantes   Patient/Family Present   Patient Present No   Patient's Family Present No   Awaiting [facility] review of referral. Will d/c once accepted. Slept, medication compliant, cooperative, mild anxiety and depression, denies si/hi/ah/vh. Appears flat.

## 2023-11-07 NOTE — SOCIAL WORK
HUNTER placed follow up call to [address] with [facility] on status of referral review. [address] indicated that she will send application to CM for PT to complete along with ANABELLE, MINH and award letter for soc sec to be obtained and submitted with application. [address] will come out to see PT in person tomorrow at 11am to complete assessment. CM in agreement. Call ended mutually.

## 2023-11-07 NOTE — PLAN OF CARE
Problem: OCCUPATIONAL THERAPY ADULT  Goal: Performs self-care activities at highest level of function for planned discharge setting. See evaluation for individualized goals. Description: Treatment Interventions: ADL retraining, UE strengthening/ROM, Functional transfer training, Endurance training, Patient/family training, Energy conservation, Activityengagement          See flowsheet documentation for full assessment, interventions and recommendations. Outcome: Progressing  Note: Limitation: Decreased ADL status, Decreased UE strength, Decreased Safe judgement during ADL, Decreased endurance, Decreased high-level ADLs  Prognosis: Good  Assessment: Patient participated in Skilled OT session this date with interventions consisting of Energy Conservation techniques and therapeutic exercise to: increase functional use of BUEs, increase BUE muscle strength  . Patient agreeable to OT treatment session, upon arrival patient was found seated OOB to Chair, alert, responsive , and in no apparent distress. Patient tolerated UB TE using 1.5# weight as detailed in flow sheet. Patient declined further activity following TE due to arrival of lunch trays. Session ended with patient seated OOB to chair and all needs met. In comparison to previous session, patient with improvements in UB strength and endurance. Patient requiring verbal cues for safety, verbal cues for correct technique, and frequent rest periods. Patient performance  demonstrated good carryover of learned techniques and strategies to facilitate safety during functional tasks. Patient continues to be functioning below baseline level, occupational performance remains limited secondary to factors listed above and increased risk for falls and injury. From OT standpoint, recommendation at time of d/c would be Short Term Rehab.   Patient to benefit from continued Occupational Therapy treatment while in the hospital to address deficits as defined above and maximize Strata level of functional independence with ADLs and functional mobility in order to return to PLOF.      Rehab Resource Intensity Level, OT: III (Minimum Resource Intensity)

## 2023-11-08 PROCEDURE — 97116 GAIT TRAINING THERAPY: CPT

## 2023-11-08 PROCEDURE — 99232 SBSQ HOSP IP/OBS MODERATE 35: CPT

## 2023-11-08 RX ORDER — AMLODIPINE BESYLATE 5 MG/1
5 TABLET ORAL DAILY
Status: DISCONTINUED | OUTPATIENT
Start: 2023-11-09 | End: 2023-11-10 | Stop reason: HOSPADM

## 2023-11-08 RX ORDER — HYDROCHLOROTHIAZIDE 12.5 MG/1
12.5 TABLET ORAL DAILY
Status: DISCONTINUED | OUTPATIENT
Start: 2023-11-09 | End: 2023-11-10 | Stop reason: HOSPADM

## 2023-11-08 RX ADMIN — CLONAZEPAM 1 MG: 1 TABLET ORAL at 21:19

## 2023-11-08 RX ADMIN — QUETIAPINE 200 MG: 50 TABLET, FILM COATED, EXTENDED RELEASE ORAL at 21:18

## 2023-11-08 RX ADMIN — ACETAMINOPHEN 975 MG: 325 TABLET ORAL at 13:19

## 2023-11-08 RX ADMIN — PREGABALIN 100 MG: 100 CAPSULE ORAL at 09:11

## 2023-11-08 RX ADMIN — ASPIRIN 81 MG: 81 TABLET, COATED ORAL at 09:11

## 2023-11-08 RX ADMIN — LORATADINE 10 MG: 10 TABLET ORAL at 09:11

## 2023-11-08 RX ADMIN — PRAZOSIN HYDROCHLORIDE 1 MG: 1 CAPSULE ORAL at 21:18

## 2023-11-08 RX ADMIN — SERTRALINE HYDROCHLORIDE 150 MG: 50 TABLET ORAL at 09:11

## 2023-11-08 RX ADMIN — HYDROXYZINE HYDROCHLORIDE 25 MG: 25 TABLET ORAL at 13:15

## 2023-11-08 RX ADMIN — ATORVASTATIN CALCIUM 40 MG: 40 TABLET, FILM COATED ORAL at 16:03

## 2023-11-08 RX ADMIN — Medication 6 MG: at 21:18

## 2023-11-08 RX ADMIN — PREGABALIN 100 MG: 100 CAPSULE ORAL at 21:18

## 2023-11-08 RX ADMIN — PANTOPRAZOLE SODIUM 40 MG: 40 TABLET, DELAYED RELEASE ORAL at 06:19

## 2023-11-08 RX ADMIN — CLONAZEPAM 1 MG: 1 TABLET ORAL at 09:11

## 2023-11-08 RX ADMIN — PREGABALIN 100 MG: 100 CAPSULE ORAL at 16:03

## 2023-11-08 RX ADMIN — AMLODIPINE BESYLATE 10 MG: 10 TABLET ORAL at 09:11

## 2023-11-08 NOTE — PHYSICAL THERAPY NOTE
PHYSICAL THERAPY TREATMENT NOTE  NAME:  Samantha Cortes  DATE: 11/08/23    Length Of Stay: 20  Performed at least 2 patient identifiers during session: Name and ID bracelet    TREATMENT:      11/08/23 1323   PT Last Visit   PT Visit Date 11/08/23   Note Type   Note Type Treatment   Pain Assessment   Pain Assessment Tool 0-10   Pain Score 10 - Worst Possible Pain   Pain Location/Orientation Location: Generalized   Pain Onset/Description Onset: Ongoing;Frequency: Constant/Continuous   Effect of Pain on Daily Activities mobility   Patient's Stated Pain Goal No pain   Hospital Pain Intervention(s) Ambulation/increased activity; Medication (See MAR); Emotional support   Restrictions/Precautions   Weight Bearing Precautions Per Order No   Other Precautions Cognitive; Fall Risk;Pain   General   Chart Reviewed Yes   Family/Caregiver Present No   Cognition   Overall Cognitive Status Impaired   Arousal/Participation Alert; Responsive; Cooperative   Attention Within functional limits   Orientation Level Oriented X4   Memory Decreased recall of precautions;Decreased recall of recent events   Following Commands Follows one step commands without difficulty   Comments pt agreeable to PT session   Subjective   Subjective "I need a water pill, I'm swollen all over"   Bed Mobility   Additional Comments pt recieved standing at nurses station   Transfers   Stand to Sit 6  Modified independent   Additional items Increased time required   Additional Comments RW used   Ambulation/Elevation   Gait pattern Improper Weight shift; Forward Flexion;Decreased foot clearance   Gait Assistance 6  Modified independent   Assistive Device Rolling walker   Distance 250 ftx1   Stair Management Assistance Not tested   Balance   Static Sitting Normal   Dynamic Sitting Good   Static Standing Fair +   Dynamic Standing Fair   Ambulatory Fair   Endurance Deficit   Endurance Deficit Yes   Activity Tolerance   Activity Tolerance Patient limited by fatigue;Patient limited by pain   Nurse Made Aware yes   Assessment   Prognosis Good   Problem List Decreased strength;Decreased endurance; Impaired balance;Decreased mobility;Pain   Assessment Pt seen for PT treatment session this date with interventions consisting of gait training w/ emphasis on improving pt's ability to ambulate level surfaces x 250 ftx1 with modified independent provided by therapist with RW and therapeutic activity consisting of training: sit<>stand transfers. Pt agreeable to PT treatment session upon arrival, pt found standing at nurses station, in no apparent distress and responsive. In comparison to previous session, pt with improvements in distance ambulated and amount of cues required . Post session: all needs in reach and RN notified of session findings/recommendations. Continue to recommend Level III (Minimum Resource Intensity) at time of d/c in order to maximize pt's functional independence and safety w/ mobility. Pt continues to be functioning below baseline level. PT will continue to see pt during current hospitalization in order to address the deficits listed above and provide interventions consistent w/ POC in effort to achieve STGs. Estephanie Childs, PTA   Barriers to Discharge Inaccessible home environment;Decreased caregiver support   Goals   Patient Goals "to get a water pill"   STG Expiration Date 11/09/23   PT Treatment Day 3   Plan   Treatment/Interventions Functional transfer training;LE strengthening/ROM; Therapeutic exercise; Endurance training;Equipment eval/education; Bed mobility;Gait training;Spoke to nursing   Progress Progressing toward goals   PT Frequency   (2-5x/wk)   Discharge Recommendation   Rehab Resource Intensity Level, PT III (Minimum Resource Intensity)   AM-PAC Basic Mobility Inpatient   Turning in Flat Bed Without Bedrails 4   Lying on Back to Sitting on Edge of Flat Bed Without Bedrails 4   Moving Bed to Chair 4   Standing Up From Chair Using Arms 4 Walk in Room 4   Climb 3-5 Stairs With Railing 2   Basic Mobility Inpatient Raw Score 22   Basic Mobility Standardized Score 47.4   Highest Level Of Mobility   JH-HLM Goal 7: Walk 25 feet or more   JH-HLM Achieved 8: Walk 250 feet ot more   End of Consult   Patient Position at End of Consult All needs within reach         The patient's AM-PAC Basic Mobility Inpatient Short Form Raw Score is 22. A Raw score of greater than 16 suggests the patient may benefit from discharge to home. Please also refer to the recommendation of the Physical Therapist for safe discharge planning.       Alma Delia Geller, PTA,PTA

## 2023-11-08 NOTE — SOCIAL WORK
HUNTER received voicemail from [place] from [facility] that she had emergency at center and that she is running later. HUNTER returned call and spoke with Bethany eugene whom indicated that she will be here in 30 minutes. CM in agreement. Call ended mutually.

## 2023-11-08 NOTE — SOCIAL WORK
CM met with PT for PT check in. PT denies si/hi/ah/vh anxiety and depression. PT reflected that she is ready and looking forward to her meeting with Nara Reyes with [facility] today. Encouragement provided.

## 2023-11-08 NOTE — NURSING NOTE
Patient was visible in milieu, calm and cooperative with staff and peers , pt complied with medications and meals. Pt denies SI,HI and no self injurious behavior. Staff maintained Q 7 safety checks, administered medications as prescribed and assisted as needed. We will continue to monitor support and encourage.

## 2023-11-08 NOTE — SOCIAL WORK
PT and CM took part in meeting with Simi with [facility], assessment went well they are accepting PT. CM to complete application, OR94 and DME and submit with soc sec letter to [facility] and plan for a discharge to their facility permitted that the medications are received on site prior to Friday and all necessary paperwork is completed. [facility] indicated that they require 2 months deposit but PT does not have this so [facility] will work with team and make an exception as she understands PT has been cleared for d/c to a lesser restrictive setting at this time. [facility] will email CM the requeired documentation for completion. Sera is [facility] and Dulce Gasca is psych provider.

## 2023-11-08 NOTE — PROGRESS NOTES
11/08/23    Team Meeting   Meeting Type Daily Rounds   Team Members Present   Team Members Present Physician;Nurse;;Occupational Therapist   Physician Team Member Dr. Paula Herrera MD; [facility]   Nursing Team Member Alyssia Rowe RN   Care Management Team Member MS Francois, Neela [facility]   OT Team Member Jaime Castaneda   Patient/Family Present   Patient Present No   Patient's Family Present No   Awaiting pch placement. [facility] coming to assess today in person at 1100. Slept, endorses anxiety and depression, denies si/hi/ah/vh, uses walker.

## 2023-11-08 NOTE — PROGRESS NOTES
Progress Note - Behavioral Health   Samantha Levin 59 y.o. female MRN: [id number]  Unit/Bed#: Geovani Calhoun Encounter: [id number]    Assessment/Plan   Principal Problem:    Generalized anxiety disorder with panic attacks  Active Problems:    Post traumatic stress disorder    Depression      Behavior over the last 24 hours:  unchanged  Sleep: normal  Appetite: normal  Medication side effects: No  ROS: no complaints and all other systems are negative    Subjective: Samantha was seen today for psychiatric follow-up. Patient calm, cooperative. She is visible on the unit, social with peers. Patient appears less withdrawn and isolative to self on the unit, with more interaction in the milieu. She however remains ruminative and negative in thought. She endorses anxiety and depression. She denied any sleep disturbance, SI/HI/AVH. She did not appear to responding to internal stimuli. Mental Status Evaluation:  Appearance:  age appropriate and casually dressed   Behavior:  Calm, cooperative   Speech:  dysarthric   Mood:  anxious and depressed   Affect:  mood-congruent   Thought Process:  goal directed   Associations: intact associations   Thought Content:  Negative, Ruminative thoughts   Perceptual Disturbances: Denied AVH, did not appear internally preoccupied   Risk Potential: Suicidal Ideations none at present  Homicidal Ideations none at present  Potential for Aggression No   Sensorium:  person, place, and time/date   Memory:  recent and remote memory grossly intact   Consciousness:  alert and awake    Attention: attention span and concentration were age appropriate   Insight:  limited   Judgment: limited   Gait/Station: Seated in a chair   Motor Activity: Dyskinetic facial movement present     Progress Toward Goals: Unchanged. Patient compliant with the current psychotropic medication regimen. She denies side effects from current psychotropic medication regimen.   We will continue current psychotropic medication regimen. Case management actively working on discharge disposition. No discharge date at this time. Recommended Treatment: Continue with group therapy, milieu therapy and occupational therapy. Risks, benefits and possible side effects of Medications:   Risks, benefits, and possible side effects of medications explained to patient and patient verbalizes understanding. Medications: all current active meds have been reviewed. Labs: I have personally reviewed all pertinent laboratory/tests results. Most Recent Labs:   Lab Results   Component Value Date    WBC 3.39 (L) 10/19/2023    RBC 4.08 10/19/2023    HGB 12.9 10/19/2023    HCT 38.6 10/19/2023     10/19/2023    RDW 13.4 10/19/2023    NEUTROABS 1.17 (L) 10/19/2023    SODIUM 139 10/19/2023    K 3.6 10/19/2023     10/19/2023    CO2 28 10/19/2023    BUN 11 10/19/2023    CREATININE 0.66 10/19/2023    GLUC 86 10/19/2023    GLUF 86 10/19/2023    CALCIUM 8.7 10/19/2023    AST 24 10/18/2023    ALT 27 10/18/2023    ALKPHOS 102 10/18/2023    TP 6.2 (L) 10/18/2023    ALB 4.3 10/18/2023    TBILI 0.76 10/18/2023    CHOLESTEROL 129 11/03/2023    HDL 63 11/03/2023    TRIG 44 11/03/2023    LDLCALC 57 11/03/2023    NONHDLC 66 11/03/2023    LITHIUM <0.1 (L) 03/23/2023    AMMONIA <10 (L) 06/27/2021    RGG7SVOXYBMC 0.765 10/18/2023    FREET4 0.80 02/13/2020    PREGSERUM Negative 02/22/2014    RPR Non-Reactive 06/29/2021    HGBA1C 4.5 07/19/2023    EAG 82 07/19/2023       Counseling / Coordination of Care  Total floor / unit time spent today 25 minutes.

## 2023-11-08 NOTE — PROGRESS NOTES
Progress Note - Samantha Vasquez 59 y.o. female MRN: [id number]    Unit/Bed#: Perla Bamberger Encounter: [id number]        Subjective:   Patient seen and examined at bedside after reviewing the chart and discussing the case with the caring staff. Patient examined at bedside. Patient reports no acute symptoms. Physical Exam   Vitals: Blood pressure 111/70, pulse 63, temperature 97.5 °F (36.4 °C), temperature source Temporal, resp. rate 18, height 5' 1" (1.549 m), weight 85 kg (187 lb 6.4 oz), SpO2 100 %, not currently breastfeeding. ,Body mass index is 35.41 kg/m². Constitutional: Patient in no acute distress. HEENT: PERR, EOMI, MMM. Cardiovascular: Normal rate and regular rhythm. Pulmonary/Chest: Effort normal and breath sounds normal, slight expiratory wheeze. Abdomen: Soft, + BS, NT. Assessment/Plan:  Samantha Vasquez is a(n) 59 y.o. female with panic disorder. 1. Cardiac with hx HTN, HLD. Continue amlodipine 10 mg daily, atorvastatin 40 mg daily, ASA 81 mg daily. 2. Tardive dyskinesia. Home valbenazine tosylate 40 mg daily nonformulary. 3. Iron deficiency anemia. Patient is on ferrous sulfate 324 mg every other day. 4. DJD/OA/chronic back pain. Continue Lyrica 100 mg 3 times daily. Tylenol for mild/mod pain. Tramadol q6h prn for severe pain, Robaxin and Voltaren gel prn.  5. Gait abnormality. PT OT consulted for further evaluation. 6. GERD. Patient is on Protonix 40 mg daily. 7. Vitamin D deficiency. Patient started on vitamin D2 50,000 units weekly for 8 weeks followed by vitamin D3 1000 units daily. 8. Vitamin B12 deficiency. Patient started on vitamin B-12 monthly injections. 9. Allergic rhinitis/itching. Started on Claritin 10 mg daily 10/25/23. Flonase and benadryl cream prn. 10. Wheezing. Albuterol inhaler prn. 11. Leg swelling. Compression socks ordered. Encouraged to elevate extremities. Continue to monitor.      The patient was discussed with Dr. Jamel Keys and he is in agreement with the above note.

## 2023-11-08 NOTE — PLAN OF CARE
Problem: PHYSICAL THERAPY ADULT  Goal: Performs mobility at highest level of function for planned discharge setting. See evaluation for individualized goals. Description: Treatment/Interventions: Functional transfer training, LE strengthening/ROM, Therapeutic exercise, Endurance training, Elevations, Patient/family training, Equipment eval/education, Bed mobility, Gait training, Spoke to nursing, Spoke to case management  Equipment Recommended:  (pt encouraged to use RW at this time)       See flowsheet documentation for full assessment, interventions and recommendations. Outcome: Progressing  Note: Prognosis: Good  Problem List: Decreased strength, Decreased endurance, Impaired balance, Decreased mobility, Pain  Assessment: Pt seen for PT treatment session this date with interventions consisting of gait training w/ emphasis on improving pt's ability to ambulate level surfaces x 250 ftx1 with modified independent provided by therapist with RW and therapeutic activity consisting of training: sit<>stand transfers. Pt agreeable to PT treatment session upon arrival, pt found standing at nurses station, in no apparent distress and responsive. In comparison to previous session, pt with improvements in distance ambulated and amount of cues required . Post session: all needs in reach and RN notified of session findings/recommendations. Continue to recommend Level III (Minimum Resource Intensity) at time of d/c in order to maximize pt's functional independence and safety w/ mobility. Pt continues to be functioning below baseline level. PT will continue to see pt during current hospitalization in order to address the deficits listed above and provide interventions consistent w/ POC in effort to achieve STGs.     [address], SHERLY  Barriers to Discharge: Inaccessible home environment, Decreased caregiver support     Rehab Resource Intensity Level, PT: III (Minimum Resource Intensity)    See flowsheet documentation for full assessment.

## 2023-11-08 NOTE — NURSING NOTE
Patient has been visible on the unit. She is pleasant and cooperative. Social with staff and peers. Endorsed a little anxiety and depression. Denied SI,HI, or hallucinations. Administered tylenol earlier for a headache and was effective. Q 7 minute safety checks maintained.

## 2023-11-09 ENCOUNTER — TELEPHONE (OUTPATIENT)
Dept: NEUROLOGY | Facility: CLINIC | Age: 58
End: 2023-11-09

## 2023-11-09 PROBLEM — Z00.8 MEDICAL CLEARANCE FOR PSYCHIATRIC ADMISSION: Status: RESOLVED | Noted: 2021-07-16 | Resolved: 2023-11-09

## 2023-11-09 PROBLEM — R56.9 WITNESSED SEIZURE-LIKE ACTIVITY (HCC): Status: RESOLVED | Noted: 2023-04-01 | Resolved: 2023-11-09

## 2023-11-09 PROBLEM — F32.A DEPRESSION: Status: RESOLVED | Noted: 2023-10-19 | Resolved: 2023-11-09

## 2023-11-09 PROBLEM — D64.9 ANEMIA: Status: RESOLVED | Noted: 2022-07-06 | Resolved: 2023-11-09

## 2023-11-09 PROBLEM — G93.40 ENCEPHALOPATHY: Status: RESOLVED | Noted: 2021-06-27 | Resolved: 2023-11-09

## 2023-11-09 PROBLEM — R29.90 STROKE-LIKE EPISODE: Status: RESOLVED | Noted: 2023-07-19 | Resolved: 2023-11-09

## 2023-11-09 PROBLEM — E87.6 HYPOKALEMIA: Status: RESOLVED | Noted: 2022-03-21 | Resolved: 2023-11-09

## 2023-11-09 PROBLEM — M62.838 MUSCLE SPASM: Status: RESOLVED | Noted: 2023-09-15 | Resolved: 2023-11-09
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
## 2023-11-09 PROBLEM — K59.00 CONSTIPATION: Status: RESOLVED | Noted: 2022-03-15 | Resolved: 2023-11-09

## 2023-11-09 PROCEDURE — 99232 SBSQ HOSP IP/OBS MODERATE 35: CPT

## 2023-11-09 RX ORDER — PRAZOSIN HYDROCHLORIDE 1 MG/1
1 CAPSULE ORAL
Qty: 30 CAPSULE | Refills: 0 | Status: SHIPPED | OUTPATIENT
Start: 2023-11-09

## 2023-11-09 RX ORDER — QUETIAPINE 200 MG/1
200 TABLET, FILM COATED, EXTENDED RELEASE ORAL
Qty: 30 TABLET | Refills: 0 | Status: SHIPPED | OUTPATIENT
Start: 2023-11-09

## 2023-11-09 RX ORDER — MELATONIN
1000 DAILY
Qty: 30 TABLET | Refills: 0 | Status: SHIPPED | OUTPATIENT
Start: 2023-12-10

## 2023-11-09 RX ORDER — CYANOCOBALAMIN 1000 UG/ML
1000 INJECTION, SOLUTION INTRAMUSCULAR; SUBCUTANEOUS
Qty: 1 ML | Refills: 0 | Status: SHIPPED | OUTPATIENT
Start: 2023-11-20

## 2023-11-09 RX ORDER — ERGOCALCIFEROL 1.25 MG/1
50000 CAPSULE ORAL WEEKLY
Qty: 5 CAPSULE | Refills: 0 | Status: SHIPPED | OUTPATIENT
Start: 2023-11-11 | End: 2023-12-10

## 2023-11-09 RX ORDER — HYDROCHLOROTHIAZIDE 12.5 MG/1
12.5 TABLET ORAL DAILY
Qty: 30 TABLET | Refills: 0 | Status: SHIPPED | OUTPATIENT
Start: 2023-11-10

## 2023-11-09 RX ORDER — LORATADINE 10 MG/1
10 TABLET ORAL DAILY
Qty: 30 TABLET | Refills: 0 | Status: SHIPPED | OUTPATIENT
Start: 2023-11-10

## 2023-11-09 RX ORDER — ATORVASTATIN CALCIUM 40 MG/1
40 TABLET, FILM COATED ORAL DAILY
Qty: 30 TABLET | Refills: 0 | Status: SHIPPED | OUTPATIENT
Start: 2023-11-09

## 2023-11-09 RX ORDER — AMLODIPINE BESYLATE 5 MG/1
5 TABLET ORAL DAILY
Qty: 30 TABLET | Refills: 0 | Status: SHIPPED | OUTPATIENT
Start: 2023-11-10

## 2023-11-09 RX ORDER — FERROUS SULFATE 324(65)MG
324 TABLET, DELAYED RELEASE (ENTERIC COATED) ORAL EVERY OTHER DAY
Qty: 15 TABLET | Refills: 0 | Status: SHIPPED | OUTPATIENT
Start: 2023-11-09

## 2023-11-09 RX ORDER — PANTOPRAZOLE SODIUM 40 MG/1
40 TABLET, DELAYED RELEASE ORAL
Qty: 30 TABLET | Refills: 0 | Status: SHIPPED | OUTPATIENT
Start: 2023-11-09

## 2023-11-09 RX ORDER — LANOLIN ALCOHOL/MO/W.PET/CERES
6 CREAM (GRAM) TOPICAL
Qty: 60 TABLET | Refills: 0 | Status: SHIPPED | OUTPATIENT
Start: 2023-11-09

## 2023-11-09 RX ORDER — ASPIRIN 81 MG/1
81 TABLET ORAL DAILY
Qty: 30 TABLET | Refills: 0 | Status: SHIPPED | OUTPATIENT
Start: 2023-11-09

## 2023-11-09 RX ORDER — CLONAZEPAM 1 MG/1
1 TABLET ORAL 2 TIMES DAILY
Qty: 20 TABLET | Refills: 0 | Status: SHIPPED | OUTPATIENT
Start: 2023-11-09 | End: 2023-11-19

## 2023-11-09 RX ORDER — TRAMADOL HYDROCHLORIDE 50 MG/1
50 TABLET ORAL EVERY 8 HOURS PRN
Qty: 30 TABLET | Refills: 0 | Status: SHIPPED | OUTPATIENT
Start: 2023-11-09 | End: 2023-11-19

## 2023-11-09 RX ORDER — HYDROXYZINE HYDROCHLORIDE 25 MG/1
25 TABLET, FILM COATED ORAL DAILY
Qty: 30 TABLET | Refills: 0 | Status: SHIPPED | OUTPATIENT
Start: 2023-11-09

## 2023-11-09 RX ORDER — PREGABALIN 100 MG/1
100 CAPSULE ORAL 3 TIMES DAILY
Qty: 30 CAPSULE | Refills: 0 | Status: SHIPPED | OUTPATIENT
Start: 2023-11-09 | End: 2023-11-19

## 2023-11-09 RX ADMIN — LORATADINE 10 MG: 10 TABLET ORAL at 08:31

## 2023-11-09 RX ADMIN — QUETIAPINE 200 MG: 50 TABLET, FILM COATED, EXTENDED RELEASE ORAL at 20:47

## 2023-11-09 RX ADMIN — HYDROXYZINE HYDROCHLORIDE 25 MG: 25 TABLET ORAL at 12:51

## 2023-11-09 RX ADMIN — ATORVASTATIN CALCIUM 40 MG: 40 TABLET, FILM COATED ORAL at 16:55

## 2023-11-09 RX ADMIN — CLONAZEPAM 1 MG: 1 TABLET ORAL at 20:48

## 2023-11-09 RX ADMIN — HYDROCHLOROTHIAZIDE 12.5 MG: 12.5 TABLET ORAL at 08:31

## 2023-11-09 RX ADMIN — ALBUTEROL SULFATE 2 PUFF: 108 INHALANT RESPIRATORY (INHALATION) at 08:59

## 2023-11-09 RX ADMIN — AMLODIPINE BESYLATE 5 MG: 5 TABLET ORAL at 08:31

## 2023-11-09 RX ADMIN — PREGABALIN 100 MG: 100 CAPSULE ORAL at 08:31

## 2023-11-09 RX ADMIN — PRAZOSIN HYDROCHLORIDE 1 MG: 1 CAPSULE ORAL at 20:48

## 2023-11-09 RX ADMIN — SERTRALINE HYDROCHLORIDE 150 MG: 50 TABLET ORAL at 08:31

## 2023-11-09 RX ADMIN — TRAMADOL HYDROCHLORIDE 50 MG: 50 TABLET, COATED ORAL at 09:00

## 2023-11-09 RX ADMIN — FERROUS SULFATE TAB 325 MG (65 MG ELEMENTAL FE) 325 MG: 325 (65 FE) TAB at 08:31

## 2023-11-09 RX ADMIN — Medication 6 MG: at 20:48

## 2023-11-09 RX ADMIN — PREGABALIN 100 MG: 100 CAPSULE ORAL at 20:47

## 2023-11-09 RX ADMIN — DICLOFENAC SODIUM 2 G: 10 GEL TOPICAL at 09:00

## 2023-11-09 RX ADMIN — PANTOPRAZOLE SODIUM 40 MG: 40 TABLET, DELAYED RELEASE ORAL at 05:55

## 2023-11-09 RX ADMIN — PREGABALIN 100 MG: 100 CAPSULE ORAL at 16:55

## 2023-11-09 RX ADMIN — CLONAZEPAM 1 MG: 1 TABLET ORAL at 08:31

## 2023-11-09 RX ADMIN — ASPIRIN 81 MG: 81 TABLET, COATED ORAL at 08:31

## 2023-11-09 NOTE — SOCIAL WORK
CM met with PT and reviewed the application for ANABELLE Francis, as well as SELVIN WILKINSON in agreement with all and signed. CM sent via secure email the application, [id number], and ANABELLE to [address] at [facility] for review, inquired when transport may be scheduled for tomorrow. Pending response.

## 2023-11-09 NOTE — OCCUPATIONAL THERAPY NOTE
11/09/23 1209   Note Type   Note Type Cancelled Session   Cancel Reasons Refusal       Attempted OT treatment today at 1209. Patient still in bed asleep and upon awakening, declined therapy due to not feeling well. Will continue with POC as able.   Charyl Dancer

## 2023-11-09 NOTE — TELEPHONE ENCOUNTER
Recd vm 11/7 taken off 11/9    I'm calling from [address] looking for an alternate contact for aicha. Luis Fernando Bettencourt. YOB: 1965. Calling to schedule a shipment for ingrezza. Can't get a hold of her. Just give us a call back at [phone]. We are open Monday through Friday. 8 AM. 8 AM to 5 30 PM Central Standard Time.

## 2023-11-09 NOTE — SOCIAL WORK
Nicholas Trujillo claimed the ride for [employer] in unit/room [facility] 200 bed [facility] 200-01, and will arrive on 11/10/2023 at 10:00am EST. Contact them at [phone].  Ride details here: [email or web address]

## 2023-11-09 NOTE — NURSING NOTE
Patient was visible in milieu , calm, cooperative and pleasant upon approach. Patient complied with medication, and meals,denies SI,HI and no self injurious behavior. Staff maintained Q 7 safety checks, administered medications as prescribed, and assisted as needed. We will continue to monitor, support and encourage.

## 2023-11-09 NOTE — PROGRESS NOTES
11/09/23    Team Meeting   Meeting Type Daily Rounds   Team Members Present   Team Members Present Physician;Nurse;;Occupational Therapist   Physician Team Member Dr. Jamar Hendricks MD; [facility]   Nursing Team Member Fausto Finch RN   Care Management Team Member MS Francois, [facility]   OT Team Member Jaime Galeas   Patient/Family Present   Patient Present No   Patient's Family Present No   Possible d/c to [employer]. Slept, endorses anxiety, denies si/hi/ah/vh, prn. Social, medication compliant, uses walker.

## 2023-11-09 NOTE — DISCHARGE INSTR - OTHER ORDERS
You are being discharged to [facility] located at [address] [address], Phone: [phone]. Triggers you have identified during your hospitalization that led to your admission distressed mood, regression in mental health. Coping skills you have identified during your hospitalization include talking with others, music, tv, reading. If you are unable to deal with your distressed mood alone please contact a  member of your team at [facility] at . If that is not effective and you continue to have (ex: suicidal ideation, homicidal ideation, distressed mood, overwhelmed, in crisis) please contact (Crisis #) [address]: [phone] dial 291 or go to the nearest emergency center. [facility] Crisis Hotline: 127.333.1718  *[place] Alcohol Anonymous: [address]  [facility] [employer] [facility] ([facility]) 3645-114 on [address] [facility] ([facility]) HELPLINE: 742.933.8125/Website: www.elle.Motivapps  *Substance Abuse and [address] Administration([facility]) [employer], which is a confidential, free, 24-hour-a-day, 365-day-a-year, information service for individuals and family members facing mental health and/or substance use disorders. This service provides referrals to local treatment facilities, support groups, and community-based organizations. Callers can also order free publications and other information. Call 7-103.191.1490/Website: www.[facility].gov  *[facility] 2-1-1: This is a toll free, confidential, 24-hour-a-day service which connects you to a community  in your area who can help you find services and resources that are available to you locally and provide critical services that can improve and save lives.   Call: 211  /Website: https://bhupendravitalclipcolin.net/       Elva Eli or Tayla, pennie Reyes, will be calling you after your discharge, on the phone number that you provided. They will be available as an additional support, if needed. If you wish to speak with one of them, you may contact Aleksander Bailey at [phone] or Moraima Seth at [phone].

## 2023-11-09 NOTE — PROGRESS NOTES
Progress Note - Behavioral Health   Samantha Rm Eye 59 y.o. female MRN: [id number]  Unit/Bed#: [facility] Encounter: [id number]    Assessment/Plan   Principal Problem:    Generalized anxiety disorder with panic attacks  Active Problems:    Post traumatic stress disorder    Depression      Behavior over the last 24 hours:  unchanged  Sleep: normal  Appetite: normal  Medication side effects: No  ROS: no complaints and all other systems are negative    Subjective: Samantha was seen today for psychiatric follow-up. Patient calm, cooperative. She is visible on the unit, social with peers. Patient appears less anxious and depressed. She is more engaged in conversation. Per patient "my meeting went [facility] yesterday went well ."  Patient compliant with her current medication regimen. She denied any sleep disturbance, SI/HI/AVH. She did not appear to respond to internal stimuli. Mental Status Evaluation:  Appearance:  age appropriate and casually dressed   Behavior:  Calm, cooperative   Speech:  dysarthric   Mood:  Less anxious and depressed   Affect:  mood-congruent   Thought Process:  goal directed   Associations: intact associations   Thought Content:  Ruminative thoughts   Perceptual Disturbances: Denied AVH, did not appear internally preoccupied   Risk Potential: Suicidal Ideations none at present  Homicidal Ideations none at present  Potential for Aggression No   Sensorium:  person, place, and time/date   Memory:  recent and remote memory grossly intact   Consciousness:  alert and awake    Attention: attention span and concentration were age appropriate   Insight:  limited   Judgment: limited   Gait/Station: Seated in a chair   Motor Activity: Dyskinetic facial movement present     Progress Toward Goals: Unchanged. Patient continues to exhibit a controlled mood on the unit. She is compliant with the current psychotropic medication regimen.   She denies side effects from current psychotropic medication regimen. Will continue current psychotropic medication regimen. Case management actively working on discharge disposition. No discharge date at this time. Recommended Treatment: Continue with group therapy, milieu therapy and occupational therapy. Risks, benefits and possible side effects of Medications:   Risks, benefits, and possible side effects of medications explained to patient and patient verbalizes understanding. Medications: all current active meds have been reviewed. Labs: I have personally reviewed all pertinent laboratory/tests results. Most Recent Labs:   Lab Results   Component Value Date    WBC 3.39 (L) 10/19/2023    RBC 4.08 10/19/2023    HGB 12.9 10/19/2023    HCT 38.6 10/19/2023     10/19/2023    RDW 13.4 10/19/2023    NEUTROABS 1.17 (L) 10/19/2023    SODIUM 139 10/19/2023    K 3.6 10/19/2023     10/19/2023    CO2 28 10/19/2023    BUN 11 10/19/2023    CREATININE 0.66 10/19/2023    GLUC 86 10/19/2023    GLUF 86 10/19/2023    CALCIUM 8.7 10/19/2023    AST 24 10/18/2023    ALT 27 10/18/2023    ALKPHOS 102 10/18/2023    TP 6.2 (L) 10/18/2023    ALB 4.3 10/18/2023    TBILI 0.76 10/18/2023    CHOLESTEROL 129 11/03/2023    HDL 63 11/03/2023    TRIG 44 11/03/2023    LDLCALC 57 11/03/2023    NONHDLC 66 11/03/2023    LITHIUM <0.1 (L) 03/23/2023    AMMONIA <10 (L) 06/27/2021    RQC4SWCDEWDB 0.765 10/18/2023    FREET4 0.80 02/13/2020    PREGSERUM Negative 02/22/2014    RPR Non-Reactive 06/29/2021    HGBA1C 4.5 07/19/2023    EAG 82 07/19/2023       Counseling / Coordination of Care  Total floor / unit time spent today 25 minutes.

## 2023-11-09 NOTE — BH TRANSITION RECORD
Contact Information: If you have any questions, concerns, pended studies, tests and/or procedures, or emergencies regarding your inpatient behavioral health visit. Please contact [facility] [phone] and ask to speak to a , nurse or physician. A contact is available 24 hours/ 7 days a week at this number. Summary of Procedures Performed During your Stay:  Below is a list of major procedures performed during your hospital stay and a summary of results:  - No major procedures performed. Pending Studies (From admission, onward)      None          Please follow up on the above pending studies with your PCP and/or referring provider.

## 2023-11-09 NOTE — SOCIAL WORK
CM placed call to [address] at [facility] [phone] to follow up on scheduling discharge to facility tomorrow. Simi confirmed receipt of email documentation and that she confirmed with pharmacy receipt of scripts. [address] is ok with PT transfer tomorrow to their facility. CM to schedule transport. Call ended mutually. HUNTER placed call to PT daughter Radha Ralph [phone], left message requesting return call. CM sent wheelchair transport request through roundtrip for 10am tomorrow, pending confirmation.

## 2023-11-09 NOTE — PLAN OF CARE
Problem: Nutrition/Hydration-ADULT  Goal: Nutrient/Hydration intake appropriate for improving, restoring or maintaining nutritional needs  Description: Monitor and assess patient's nutrition/hydration status for malnutrition. Collaborate with interdisciplinary team and initiate plan and interventions as ordered. Monitor patient's weight and dietary intake as ordered or per policy. Utilize nutrition screening tool and intervene as necessary. Determine patient's food preferences and provide high-protein, high-caloric foods as appropriate.      INTERVENTIONS:  - Monitor oral intake, urinary output, labs, and treatment plans  - Assess nutrition and hydration status and recommend course of action  - Evaluate amount of meals eaten  - Assist patient with eating if necessary   - Allow adequate time for meals  - Recommend/ encourage appropriate diets, oral nutritional supplements, and vitamin/mineral supplements  - Order, calculate, and assess calorie counts as needed  - Recommend, monitor, and adjust tube feedings and TPN/PPN based on assessed needs  - Assess need for intravenous fluids  - Provide specific nutrition/hydration education as appropriate  - Include patient/family/caregiver in decisions related to nutrition  Outcome: Progressing     Problem: Ineffective Coping  Goal: Identifies healthy coping skills  Outcome: Progressing     Problem: Ineffective Coping  Goal: Identifies ineffective coping skills  Outcome: Progressing Pt ate 100% of lunch tray. Tolerated well. Pt requesting a 2nd tray.      Ananda Oliver RN  11/14/21 [id number]

## 2023-11-09 NOTE — PHYSICAL THERAPY NOTE
11/09/23 1351   PT Last Visit   PT Visit Date 11/09/23   Note Type   Note Type Cancelled Session   Cancel Reasons Refusal     Physical Therapy Cancellation Note    Chart review performed. At this time, PT treatment session cancelled , pt declined despite encouragement. PT will follow and provide PT interventions as appropriate.     Arabella Birmingham, PTA

## 2023-11-09 NOTE — PLAN OF CARE
Problem: DISCHARGE PLANNING  Goal: Discharge to home or other facility with appropriate resources  Description: INTERVENTIONS:  - Identify barriers to discharge w/patient and caregiver  - Arrange for needed discharge resources and transportation as appropriate  - Identify discharge learning needs (meds, wound care, etc.)  - Arrange for interpretive services to assist at discharge as needed  - Refer to Case Management Department for coordinating discharge planning if the patient needs post-hospital services based on physician/advanced practitioner order or complex needs related to functional status, cognitive ability, or social support system  Outcome: Completed   PT will d/c tomorrow 11/10/23 to [facility] and follow up with in house medical and psychiatry.
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
## 2023-11-09 NOTE — PROGRESS NOTES
Progress Note - Samantha okeefe 59 y.o. female MRN: [id number]    Unit/Bed#: Anamika Sanchez Encounter: [id number]        Subjective:   Patient seen and examined at bedside after reviewing the chart and discussing the case with the caring staff. Patient examined at bedside. Patient complaining of swelling in her legs and requesting to be on a water pill. Patient is being discharged tomorrow, Friday 11/10/2023. Physical Exam   Vitals: Blood pressure 127/84, pulse 71, temperature (!) 97.4 °F (36.3 °C), temperature source Temporal, resp. rate 16, height 5' 1" (1.549 m), weight 85 kg (187 lb 6.4 oz), SpO2 98 %, not currently breastfeeding. ,Body mass index is 35.41 kg/m². Constitutional: Patient in no acute distress. HEENT: PERR, EOMI, MMM. Cardiovascular: Normal rate and regular rhythm. Pulmonary/Chest: Effort normal and breath sounds normal.  Abdomen: Soft, + BS, NT. Skin: +1 edema BLE, no erythema, no warmth, no calf tenderness. Assessment/Plan:  Samantha okeefe is a(n) 59 y.o. female with panic disorder. Medical clearance. Patient is medically cleared for discharge. All scripts were sent out for the patient. 1. Cardiac with hx HTN, HLD. Continue amlodipine 10 mg daily, atorvastatin 40 mg daily, ASA 81 mg daily. Decrease amlodipine to 5 mg daily and start HCTZ 12.5 mg daily. 2. Tardive dyskinesia. Home valbenazine tosylate 40 mg daily nonformulary. 3. Iron deficiency anemia. Patient is on ferrous sulfate 324 mg every other day. 4. DJD/OA/chronic back pain. Continue Lyrica 100 mg 3 times daily. Tylenol for mild/mod pain. Tramadol q6h prn for severe pain, Robaxin and Voltaren gel prn.  5. Gait abnormality. PT OT consulted for further evaluation. 6. GERD. Patient is on Protonix 40 mg daily. 7. Vitamin D deficiency. Patient started on vitamin D2 50,000 units weekly for 8 weeks followed by vitamin D3 1000 units daily. 8. Vitamin B12 deficiency.   Patient started on vitamin B-12 monthly injections. 9. Allergic rhinitis/itching. Started on Claritin 10 mg daily 10/25/23. Flonase and benadryl cream prn. 10. Wheezing. Albuterol inhaler prn. 11. Leg swelling. Compression socks ordered. Encouraged to elevate extremities. Patient started on HCTZ 12.5 mg daily. Continue to monitor. The patient was discussed with Dr. Amelia Crowder and he is in agreement with the above note.

## 2023-11-09 NOTE — SOCIAL WORK
Cm met with pt and reviewed d/x plan along with care and supports. Pt in agreement with all. CM reviewed IMM with pt and pt declined right to appeal and signed. Pt verbalized she is ready to d/c tomorrow.

## 2023-11-09 NOTE — NURSING NOTE
Patient has been visible in the milieu throughout the day. She is calm and cooperative. Pleasant. Enjoys reading books. She endorses mild anxiety and depression. Denies Si/Hi and hallucinations. She is looking forward to her discharge to [facility] tomorrow morning. She is able to make her needs known and offers no current complaints/ concerns. Plan of care continues. Q7 minute safety checks in progress.

## 2023-11-09 NOTE — NUTRITION
11/09/23 1532   Biochemical Data,Medical Tests, and Procedures   Biochemical Data/Medical Tests/Procedures Lab values reviewed; Meds reviewed   Labs (Comment) No new labs   Meds (Comment) norvasc, ASA, lipitor, Vit D, klonopin, FeSO4, haldol, melatonin, lyrica, minimpress, zoloft   Nutrition-Focused Physical Exam   Nutrition-Focused Physical Exam Findings RN skin assessment reviewed; No skin issues documented   Nutrition-Focused Physical Exam Findings Trace BLE edema   Medical-Related Concerns PMH reviewed. Adequacy of Intake   Nutrition Modality PO   Feeding Route   PO Independent   Current PO Intake   Current Diet Order Regular diet thin liquids. Current Meal Intake %   Estimated calorie intake compared to estimated need Nutrient needs met. PES Statement   Problem Continue previous diagnosis   Recommendations/Interventions   Malnutrition/BMI Present No  (does not meet criteria)   Summary Regular diet thin liquids. Meal completions %. No supplements. 10/28 187#, BMI=35.41; 73 weight gain from admission. 10/19 180#. Medications reviewed. No new labs. Trace BLE edema. Skin intact. Reports good appetite. Per notes anticipated discharge tomorrow. Interventions/Recommendations Continue current diet order   Education Assessment   Education Education not indicated at this time  (previously completed)   Patient Nutrition Goals   Goal Avoid weight gain; Improve to healthful diet   Goal Status Not met; Extended   Timeframe to complete goal by next f/u   Nutrition Complexity Risk   Nutrition complexity level Low risk   Follow up date 11/23/23

## 2023-11-10 VITALS
WEIGHT: 187.4 LBS | HEIGHT: 61 IN | OXYGEN SATURATION: 96 % | HEART RATE: 68 BPM | RESPIRATION RATE: 16 BRPM | DIASTOLIC BLOOD PRESSURE: 55 MMHG | TEMPERATURE: 98 F | SYSTOLIC BLOOD PRESSURE: 99 MMHG | BODY MASS INDEX: 35.38 KG/M2

## 2023-11-10 PROCEDURE — 99238 HOSP IP/OBS DSCHRG MGMT 30/<: CPT

## 2023-11-10 RX ADMIN — SERTRALINE HYDROCHLORIDE 150 MG: 50 TABLET ORAL at 08:30

## 2023-11-10 RX ADMIN — PREGABALIN 100 MG: 100 CAPSULE ORAL at 08:30

## 2023-11-10 RX ADMIN — PANTOPRAZOLE SODIUM 40 MG: 40 TABLET, DELAYED RELEASE ORAL at 05:56

## 2023-11-10 RX ADMIN — CLONAZEPAM 1 MG: 1 TABLET ORAL at 08:30

## 2023-11-10 RX ADMIN — TRAMADOL HYDROCHLORIDE 50 MG: 50 TABLET, COATED ORAL at 09:45

## 2023-11-10 RX ADMIN — LORATADINE 10 MG: 10 TABLET ORAL at 08:30

## 2023-11-10 RX ADMIN — ASPIRIN 81 MG: 81 TABLET, COATED ORAL at 08:30

## 2023-11-10 NOTE — PROGRESS NOTES
11/10/23    Team Meeting   Meeting Type Daily Rounds   Team Members Present   Team Members Present Physician;Nurse;;Occupational Therapist   Physician Team Member Dr. Sonny Olson MD   Nursing Team Member Charisse Chadwick RN   Care Management Team Member [address]   OT Team Member Fariha [place], [place]   Patient/Family Present   Patient Present No   Patient's Family Present No   D/C today to [facility] at 10am via wheel chair transport. Mild anxiety. Visible, medication compliant, denies all.

## 2023-11-10 NOTE — NURSING NOTE
Pt discharged to [facility] via [facility] AFFILIATED WITH [facility] wheelchair transport & belongings sent with pt.

## 2023-11-10 NOTE — PROGRESS NOTES
Son dropped off items for her to take with  Her when discharged.  Items in white bag in laundry room  On top of wheelchair to help transport items/

## 2023-11-10 NOTE — PROGRESS NOTES
Patient discharged with the following belongings:    Blue walker  Eyeglasses  Black shirt  2 pair black pants  2 pink shirts  Blue sweatshirt    Contraband items:  Tablet  Cell phone  2 chargers    Locked cabinet:  Black pants with string  Blue shirt with string  Blue shoes  Red bag  Black wallet  Misc papers  Cards  Make up    Contents of security bag #[id number]  2 Medicare cards  2 SS cards  2 PA ID cards  [place] card  [facility] card  VISA card  [employer] card  Birth certificate  1 pair earrings  1 ring    Contents of pharmacy bag #[id number]    Large white bag of clothes (inventory not done, bag not opened)    Belongings reviewed with patient and patient signed form

## 2023-11-10 NOTE — NURSING NOTE
Patient was visible in milieu, calm, cooperative and pleasant upon approach. Patient complied with medications and meals, denies SI,HI AH nor VH. Patient attended group meetings  and participated in group activities. Staff maintained Q 7 safety checks, administered medications as prescribed and monitored as needed. Staff will continue to monitor,support and encourage.

## 2023-11-10 NOTE — SOCIAL WORK
CM placed call to [facility]  with [address] [facility] [phone], left message requesting return call.

## 2023-11-10 NOTE — NURSING NOTE
Pt up ad dudley with walker. Pt c/o mild anxiety due to discharge & denies any depression. Pt denies any hallucinations, suicidal or homicidal ideations. Q 7 min checks maintained to monitor pt's behavior & safety. Pt is pleasant & social with other patients & staff. Pt medicated for c/o lower back pain with moderate relief obtained. Pt is cooperative & compliant with medications. ANTIONE stockings on & trace edema noted BLE. Pt instructed on discharge instructions & medications; & verbalized understanding of instructions.

## 2023-11-14 ENCOUNTER — PATIENT OUTREACH (OUTPATIENT)
Dept: INTERNAL MEDICINE CLINIC | Facility: CLINIC | Age: 58
End: 2023-11-14

## 2023-11-14 NOTE — PROGRESS NOTES
HIRAM has spoken with Sebastien Dunlap of [employer] who shares the pt must speak the Finance dept prior to being able to schedule any rides. They do have her new application which request an aide and she has gone to her [address] [place] [address] Dr Quiles Evaluation. HIRAM has also noted that from [address] that pt has been transferred to   [address] [place] [address] on 11/10/23 . HIRAM has spoken to the  [facility] [phone] who confirms the pt became a permanent resident this past Friday. She shares that pt will have a new PCP Sharifa Cage (NP?) starting on Thursday when she will see patient. She will also be seen by their Psych NP- Ariel Erazo NP and will not be going to [address] [place] [address] .   They also said they will provide personal care if needed

## 2023-11-14 NOTE — TELEPHONE ENCOUNTER
"----- Message -----   From: HIRAM May   Sent: 11/14/2023   2:53 PM EST   To: Sarah Dockery RN   Subject: FYI new residence                              Dear Robina,   I see you are trying to reach this patient. I see that pt is now residing at [address] [place] [address] on 11/10/23 . HIRAM has spoken to the  Florencia Sanderson [phone]. She shared they now order all of her medications from the pharmacy they use. Perhaps you can reach out to her?    Roseanna Galeano"

## 2023-11-14 NOTE — TELEPHONE ENCOUNTER
received vm from 11/10 at 4:46pm-A hi, I'm calling from [facility]. I'm calling in regards to mutual patient. calling to see if you have an alternative phone number for this patient. First name is Samantha. Last name is Edwin Miguel. Date of birth, of [birth year]. the phone number that we have and we haven't been successful in reaching out to the patient is [phone]. And then [phone]. If you could kindly give us a call back with an alternative phone number. we have been trying to reach out, reach patient to ship out ingrezza 40 milligrams quantity 30, she takes one capsule by month daily. Thank you and have a great day.

## 2023-11-14 NOTE — PROGRESS NOTES
Outpatient Care Management Note:    Received ADT alert that patient was [facility] from 10/19-11/10/23 for generalized anxiety disorder with panic attacks. Patient was discharged to [facility] at [address] [place] [address]. Per Mayra Herrera,  she spoke with  at [facility] and patient will be a permanent resident there and will receive her primary care there and her mental health care. Will close from outpatient nurse care management at this time. Please re-consult as needed. Please see  note for additional information.

## 2023-11-15 ENCOUNTER — PATIENT OUTREACH (OUTPATIENT)
Dept: INTERNAL MEDICINE CLINIC | Facility: CLINIC | Age: 58
End: 2023-11-15

## 2023-11-15 NOTE — PROGRESS NOTES
SW has attempted to reach pt's Dgt but her phone not in service. SW has has also reached out to pt's son in [facility] . He did share that pt has relocated to [facility]. He was unaware that was her decision until recently. He understands the so far  pt is happy with this move as she had wanted to be more independent.    He will ask his wife to call back and confirm with SW as well

## 2023-11-17 ENCOUNTER — PATIENT OUTREACH (OUTPATIENT)
Dept: INTERNAL MEDICINE CLINIC | Facility: CLINIC | Age: 58
End: 2023-11-17

## 2023-11-17 ENCOUNTER — LAB REQUISITION (OUTPATIENT)
Dept: LAB | Facility: HOSPITAL | Age: 58
End: 2023-11-17
Payer: MEDICARE

## 2023-11-17 DIAGNOSIS — R53.83 OTHER FATIGUE: ICD-10-CM

## 2023-11-17 DIAGNOSIS — I10 ESSENTIAL (PRIMARY) HYPERTENSION: ICD-10-CM

## 2023-11-17 DIAGNOSIS — D50.9 IRON DEFICIENCY ANEMIA, UNSPECIFIED: ICD-10-CM

## 2023-11-17 DIAGNOSIS — E55.9 VITAMIN D DEFICIENCY, UNSPECIFIED: ICD-10-CM

## 2023-11-17 LAB
25(OH)D3 SERPL-MCNC: 25.3 NG/ML (ref 30–100)
ALBUMIN SERPL BCP-MCNC: 4.2 G/DL (ref 3.5–5)
ALP SERPL-CCNC: 90 U/L (ref 34–104)
ALT SERPL W P-5'-P-CCNC: 18 U/L (ref 7–52)
ANION GAP SERPL CALCULATED.3IONS-SCNC: 5 MMOL/L
AST SERPL W P-5'-P-CCNC: 19 U/L (ref 13–39)
BASOPHILS # BLD AUTO: 0.03 THOUSANDS/ÂΜL (ref 0–0.1)
BASOPHILS NFR BLD AUTO: 1 % (ref 0–1)
BILIRUB SERPL-MCNC: 1.34 MG/DL (ref 0.2–1)
BUN SERPL-MCNC: 12 MG/DL (ref 5–25)
CALCIUM SERPL-MCNC: 8.9 MG/DL (ref 8.4–10.2)
CHLORIDE SERPL-SCNC: 103 MMOL/L (ref 96–108)
CO2 SERPL-SCNC: 33 MMOL/L (ref 21–32)
CREAT SERPL-MCNC: 0.81 MG/DL (ref 0.6–1.3)
EOSINOPHIL # BLD AUTO: 0.13 THOUSAND/ÂΜL (ref 0–0.61)
EOSINOPHIL NFR BLD AUTO: 5 % (ref 0–6)
ERYTHROCYTE [DISTWIDTH] IN BLOOD BY AUTOMATED COUNT: 14.1 % (ref 11.6–15.1)
GFR SERPL CREATININE-BSD FRML MDRD: 79 ML/MIN/1.73SQ M
GLUCOSE SERPL-MCNC: 80 MG/DL (ref 65–140)
HCT VFR BLD AUTO: 41.4 % (ref 34.8–46.1)
HGB BLD-MCNC: 13.5 G/DL (ref 11.5–15.4)
IMM GRANULOCYTES # BLD AUTO: 0 THOUSAND/UL (ref 0–0.2)
IMM GRANULOCYTES NFR BLD AUTO: 0 % (ref 0–2)
LYMPHOCYTES # BLD AUTO: 1 THOUSANDS/ÂΜL (ref 0.6–4.47)
LYMPHOCYTES NFR BLD AUTO: 36 % (ref 14–44)
MCH RBC QN AUTO: 29.6 PG (ref 26.8–34.3)
MCHC RBC AUTO-ENTMCNC: 32.6 G/DL (ref 31.4–37.4)
MCV RBC AUTO: 91 FL (ref 82–98)
MONOCYTES # BLD AUTO: 0.3 THOUSAND/ÂΜL (ref 0.17–1.22)
MONOCYTES NFR BLD AUTO: 11 % (ref 4–12)
NEUTROPHILS # BLD AUTO: 1.34 THOUSANDS/ÂΜL (ref 1.85–7.62)
NEUTS SEG NFR BLD AUTO: 47 % (ref 43–75)
NRBC BLD AUTO-RTO: 0 /100 WBCS
PLATELET # BLD AUTO: 265 THOUSANDS/UL (ref 149–390)
PMV BLD AUTO: 10.4 FL (ref 8.9–12.7)
POTASSIUM SERPL-SCNC: 3.6 MMOL/L (ref 3.5–5.3)
PROT SERPL-MCNC: 6.8 G/DL (ref 6.4–8.4)
RBC # BLD AUTO: 4.56 MILLION/UL (ref 3.81–5.12)
SODIUM SERPL-SCNC: 141 MMOL/L (ref 135–147)
TSH SERPL DL<=0.05 MIU/L-ACNC: 1.39 UIU/ML (ref 0.45–4.5)
WBC # BLD AUTO: 2.8 THOUSAND/UL (ref 4.31–10.16)

## 2023-11-17 PROCEDURE — 80307 DRUG TEST PRSMV CHEM ANLYZR: CPT | Performed by: NURSE PRACTITIONER

## 2023-11-17 PROCEDURE — 82306 VITAMIN D 25 HYDROXY: CPT | Performed by: NURSE PRACTITIONER

## 2023-11-17 PROCEDURE — 84443 ASSAY THYROID STIM HORMONE: CPT | Performed by: NURSE PRACTITIONER

## 2023-11-17 PROCEDURE — 80053 COMPREHEN METABOLIC PANEL: CPT | Performed by: NURSE PRACTITIONER

## 2023-11-17 PROCEDURE — 85025 COMPLETE CBC W/AUTO DIFF WBC: CPT | Performed by: NURSE PRACTITIONER

## 2023-11-17 NOTE — PROGRESS NOTES
SW has reached out to  [employer] [phone] to confirm if pt has switched Providers but SW had to leave a message. SW also wants them and pt to know that pt must pay a past bill with [employer] ( ? 79.78) before she can schedule new rides. SW also tried pt's # [phone] but had to leave a message. Sw also tried her Dgt [phone] but had to leave a message. In addition ,SW reached out to pt's son in law [phone] but again had to leave a message. SW will try again. ADDENDUM:  SW received message from pt's dgt Tunde confirming pt;s move to [facility] as per her request and wanting to be more independent. Sw did leave message for pt/family to f/u teri Melendez with the need to pay back bill to have services resume. SW left message for dgt to return call.

## 2023-11-20 ENCOUNTER — PATIENT OUTREACH (OUTPATIENT)
Dept: INTERNAL MEDICINE CLINIC | Facility: CLINIC | Age: 58
End: 2023-11-20

## 2023-11-20 NOTE — PROGRESS NOTES
SW has reached out to pt again this date but had to leave a message. SW also reached out to  [facility] [phone] but had to leave a message. SW also reached out to pt's dgt Tunde and was able to connect. She shares the pt is happy with her [facility] residence. She is going to confirm wither if she has switched to their medical provider. SW has also suggested she consider if she will f/u with [facility] and [facility] Office. If she closes there it may be hard to re-establish. She may also be very happy [facility] who [facility] visits to [facility]. HIRAM has shared  pt/family needs to call Santiago [employer] to see what is owed so she can have transportation services resumed. She will have pt confirm her medical provider switch with HIRAM.

## 2023-11-21 NOTE — PROGRESS NOTES
Chart Review    Re[facility] - Approved  Patient has been approved for [employer]. For [employer] patient is approved. Rahel Rae, the representative of Rebecca Cristobal, reported that the patient was approved but has a previous balance of $79.78. Rahel Rae gave [facility] the telephone number for the financial department [phone] so that the patient could communicate and obtain options to make the payment. [facility] called the patient to provide her with this information but she did not answer. [facility] will close the case since the patient is with another PCP Offices.

## 2023-11-22 ENCOUNTER — PATIENT OUTREACH (OUTPATIENT)
Dept: INTERNAL MEDICINE CLINIC | Facility: CLINIC | Age: 58
End: 2023-11-22

## 2023-11-22 NOTE — PROGRESS NOTES
HIRAM also reached out to the  [facility] [phone] of [facility] [address] [place] [address] [address] [address] . Pt became a resident on 11/10/23 . She confirms that pt had transitioned to their PCP and psychiatrist.   PCP Ananth Leon (NP?) Psych NP- Kait Schreiber NP. [facility] relates She relates that pt is still adjusting to their facility. HIRAM has reviewed with [facility] that pt has been approved for [employer] door to door service but must pay a past bill before she can have free rides again to medical appointment continued. She can pay for non medical rides as well. Pt may also bring an aide/ helper with her on rides for free.   HIRAM has reviewed the [employer] program with [facility] and provided their #.     SW to close case at this time and let Clerical Team know pt has switched to a new Provider so they can inform the care gap team.

## 2023-11-24 ENCOUNTER — LAB REQUISITION (OUTPATIENT)
Dept: LAB | Facility: HOSPITAL | Age: 58
End: 2023-11-24
Payer: MEDICARE

## 2023-11-24 DIAGNOSIS — F25.0 SCHIZOAFFECTIVE DISORDER, BIPOLAR TYPE (HCC): ICD-10-CM

## 2023-11-24 LAB
EST. AVERAGE GLUCOSE BLD GHB EST-MCNC: 91 MG/DL
HBA1C MFR BLD: 4.8 %
HDLC SERPL-MCNC: 60 MG/DL

## 2023-11-24 PROCEDURE — 83718 ASSAY OF LIPOPROTEIN: CPT | Performed by: NURSE PRACTITIONER

## 2023-11-24 PROCEDURE — 83036 HEMOGLOBIN GLYCOSYLATED A1C: CPT | Performed by: NURSE PRACTITIONER

## 2023-11-30 LAB — MISCELLANEOUS LAB TEST RESULT: NORMAL

## 2023-11-30 PROCEDURE — 80061 LIPID PANEL: CPT | Performed by: NURSE PRACTITIONER

## 2023-11-30 PROCEDURE — 83036 HEMOGLOBIN GLYCOSYLATED A1C: CPT | Performed by: NURSE PRACTITIONER

## 2023-12-01 ENCOUNTER — LAB REQUISITION (OUTPATIENT)
Dept: LAB | Facility: HOSPITAL | Age: 58
End: 2023-12-01
Payer: MEDICARE

## 2023-12-01 DIAGNOSIS — F25.0 SCHIZOAFFECTIVE DISORDER, BIPOLAR TYPE (HCC): ICD-10-CM

## 2023-12-01 LAB
CHOLEST SERPL-MCNC: 115 MG/DL
EST. AVERAGE GLUCOSE BLD GHB EST-MCNC: 94 MG/DL
HBA1C MFR BLD: 4.9 %
HDLC SERPL-MCNC: 50 MG/DL
LDLC SERPL CALC-MCNC: 50 MG/DL (ref 0–100)
NONHDLC SERPL-MCNC: 65 MG/DL
TRIGL SERPL-MCNC: 77 MG/DL

## 2023-12-01 NOTE — TELEPHONE ENCOUNTER
Health Maintenance Due   Topic Date Due   • Influenza Vaccine (1) 09/01/2023   • COVID-19 Vaccine (2 - 2023-24 season) 09/01/2023       Patient is due for topics listed above, she wishes to proceed with Depression Screening , but is not proceeding with Immunization(s) COVID-19 and Influenza at this time. The following has occurred: Patient declined vaccines       Review Flowsheet  More data exists       12/1/2023   PHQ 2/9 Score   Adult PHQ 2 Score 0   Adult PHQ 2 Interpretation No further screening needed   Little interest or pleasure in activity? Not at all   Feeling down, depressed or hopeless? Not at all      Pt wanted the # and to be transferred to spine pain   transferred

## 2023-12-14 NOTE — PLAN OF CARE
----- Message from Gail  sent at 12/14/2023  1:40 PM CST -----  Regarding: Refill Request  Who Called: RANJEET DELAROSA [[id number]]          New Prescription or Refill : Refill      RX Name and Strength:  blood sugar diagnostic (TRUE METRIX GLUCOSE TEST STRIP) Strp      30 day or 90 day RX:      Preferred Pharmacy: [facility]/pharm[facility] #[id number] - [place] - [address]   Phone: [phone]  Fax: [phone]            Would the patient rather a call back or a response via [facility]? Call back        Best Call Back Number:   [phone]        Additional Information: Prescription sent to [facility] pharmacy     No care due was identified.  [facility] Embedded Care Due Messages. Reference number: [id number].   12/14/2023 1:51:53 PM CST   Problem: PAIN - ADULT  Goal: Verbalizes/displays adequate comfort level or baseline comfort level  Description: Interventions:  - Encourage patient to monitor pain and request assistance  - Assess pain using appropriate pain scale  - Administer analgesics based on type and severity of pain and evaluate response  - Implement non-pharmacological measures as appropriate and evaluate response  - Consider cultural and social influences on pain and pain management  - Notify physician/advanced practitioner if interventions unsuccessful or patient reports new pain  Outcome: Progressing     Problem: SAFETY ADULT  Goal: Patient will remain free of falls  Description: INTERVENTIONS:  - Educate patient/family on patient safety including physical limitations  - Instruct patient to call for assistance with activity   - Consult OT/PT to assist with strengthening/mobility   - Keep Call bell within reach  - Keep bed low and locked with side rails adjusted as appropriate  - Keep care items and personal belongings within reach  - Initiate and maintain comfort rounds  - Make Fall Risk Sign visible to staff  - Apply yellow socks and bracelet for high fall risk patients  - Consider moving patient to room near nurses station  Outcome: Progressing     Problem: DISCHARGE PLANNING  Goal: Discharge to home or other facility with appropriate resources  Description: INTERVENTIONS:  - Identify barriers to discharge w/patient and caregiver  - Arrange for needed discharge resources and transportation as appropriate  - Identify discharge learning needs (meds, wound care, etc.)  - Arrange for interpretive services to assist at discharge as needed  - Refer to Case Management Department for coordinating discharge planning if the patient needs post-hospital services based on physician/advanced practitioner order or complex needs related to functional status, cognitive ability, or social support system  Outcome: Progressing     Problem: Ineffective Coping  Goal: Demonstrates healthy coping skills  Outcome: Progressing  Goal: Participates in unit activities  Description: Interventions:  - Provide therapeutic environment   - Provide required programming   - Redirect inappropriate behaviors   Outcome: Progressing  Goal: Patient/Family participate in treatment and DC plans  Description: Interventions:  - Provide therapeutic environment  Outcome: Progressing  Goal: Patient/Family verbalizes awareness of resources  Outcome: Progressing  Goal: Understands least restrictive measures  Description: Interventions:  - Utilize least restrictive behavior  Outcome: Progressing  Goal: Free from restraint events  Description: - Utilize least restrictive measures   - Provide behavioral interventions   - Redirect inappropriate behaviors   Outcome: Progressing     Problem: Depression  Goal: Treatment Goal: Demonstrate behavioral control of depressive symptoms, verbalize feelings of improved mood/affect, and adopt new coping skills prior to discharge  Outcome: Progressing  Goal: Verbalize thoughts and feelings  Description: Interventions:  - Assess and re-assess patient's level of risk   - Engage patient in 1:1 interactions, daily, for a minimum of 15 minutes   - Encourage patient to express feelings, fears, frustrations, hopes   Outcome: Progressing  Goal: Refrain from harming self  Description: Interventions:  - Monitor patient closely, per order   - Supervise medication ingestion, monitor effects and side effects   Outcome: Progressing  Goal: Refrain from isolation  Description: Interventions:  - Develop a trusting relationship   - Encourage socialization   Outcome: Progressing  Goal: Refrain from self-neglect  Outcome: Progressing  Goal: Attend and participate in unit activities, including therapeutic, recreational, and educational groups  Description: Interventions:  - Provide therapeutic and educational activities daily, encourage attendance and participation, and document same in the medical record   Outcome: Progressing  Goal: Complete daily ADLs, including personal hygiene independently, as able  Description: Interventions:  - Observe, teach, and assist patient with ADLS  -  Monitor and promote a balance of rest/activity, with adequate nutrition and elimination   Outcome: Progressing     Problem: Anxiety  Goal: Anxiety is at manageable level  Description: Interventions:  - Assess and monitor patient's anxiety level. - Monitor for signs and symptoms (heart palpitations, chest pain, shortness of breath, headaches, nausea, feeling jumpy, restlessness, irritable, apprehensive). - Collaborate with interdisciplinary team and initiate plan and interventions as ordered. - Baxter patient to unit/surroundings  - Explain treatment plan  - Encourage participation in care  - Encourage verbalization of concerns/fears  - Identify coping mechanisms  - Assist in developing anxiety-reducing skills  - Administer/offer alternative therapies  - Limit or eliminate stimulants  Outcome: Progressing     Problem: Nutrition/Hydration-ADULT  Goal: Nutrient/Hydration intake appropriate for improving, restoring or maintaining nutritional needs  Description: Monitor and assess patient's nutrition/hydration status for malnutrition. Collaborate with interdisciplinary team and initiate plan and interventions as ordered. Monitor patient's weight and dietary intake as ordered or per policy. Utilize nutrition screening tool and intervene as necessary. Determine patient's food preferences and provide high-protein, high-caloric foods as appropriate.      INTERVENTIONS:  - Monitor oral intake, urinary output, labs, and treatment plans  - Assess nutrition and hydration status and recommend course of action  - Evaluate amount of meals eaten  - Assist patient with eating if necessary   - Allow adequate time for meals  - Recommend/ encourage appropriate diets, oral nutritional supplements, and vitamin/mineral supplements  - Order, calculate, and assess calorie counts as needed  - Recommend, monitor, and adjust tube feedings and TPN/PPN based on assessed needs  - Assess need for intravenous fluids  - Provide specific nutrition/hydration education as appropriate  - Include patient/family/caregiver in decisions related to nutrition  Outcome: Progressing     Problem: Prexisting or High Potential for Compromised Skin Integrity  Goal: Skin integrity is maintained or improved  Description: INTERVENTIONS:  - Identify patients at risk for skin breakdown  - Assess and monitor skin integrity  - Assess and monitor nutrition and hydration status  - Monitor labs   - Assess for incontinence   - Turn and reposition patient  - Assist with mobility/ambulation  - Relieve pressure over bony prominences  - Avoid friction and shearing  - Provide appropriate hygiene as needed including keeping skin clean and dry  - Evaluate need for skin moisturizer/barrier cream  - Collaborate with interdisciplinary team   - Patient/family teaching  - Consider wound care consult   Outcome: Progressing Rm

## 2024-01-26 ENCOUNTER — OFFICE VISIT (OUTPATIENT)
Dept: AUDIOLOGY | Age: 59
End: 2024-01-26

## 2024-01-26 DIAGNOSIS — H90.3 SENSORY HEARING LOSS, BILATERAL: Primary | ICD-10-CM

## 2024-01-26 NOTE — PROGRESS NOTES
Progress Note    Name:  Samantha Rodriguez  :  1965  Age:  58 y.o.  MRN:  [id number]  Date of Evaluation: 24     HISTORY:    Patient was scheduled to discuss hearing aids today. Patient had a hearing test completed on 3/7/23 which revealed a moderate to severe mixed hearing loss with the presence of unhealthy middle ear function. The patient was informed that in order to fit hearing aids a new hearing evaluation would need to be completed due to the age of the original and due to the status of her ear health at the time of testing.     Patient was also informed that she has a [employer] benefit with her insurance. Patient noted concerns for paying for hearing aids. Patient was given information on the [employer] to see if they can help her with the cost of hearing aids. The patient was also made aware that a comparable hearing aid will [facility].     Patient was encouraged to return for an updated hearing evaluation and to further discuss hearing aids. Patient stated she will look into the [facility] hearing benefit and the [employer] prior to her upcoming visit.     Samantha was encouraged to contact the office with any questions.     Garret Rico, CCC-A  Clinical Audiologist

## 2024-02-19 ENCOUNTER — OFFICE VISIT (OUTPATIENT)
Dept: INTERNAL MEDICINE CLINIC | Facility: CLINIC | Age: 59
End: 2024-02-19

## 2024-02-19 VITALS
OXYGEN SATURATION: 99 % | DIASTOLIC BLOOD PRESSURE: 85 MMHG | SYSTOLIC BLOOD PRESSURE: 129 MMHG | RESPIRATION RATE: 16 BRPM | HEIGHT: 61 IN | HEART RATE: 61 BPM | BODY MASS INDEX: 35.98 KG/M2 | WEIGHT: 190.6 LBS | TEMPERATURE: 97.6 F

## 2024-02-19 DIAGNOSIS — K21.9 GERD WITHOUT ESOPHAGITIS: Primary | ICD-10-CM

## 2024-02-19 DIAGNOSIS — K59.09 OTHER CONSTIPATION: ICD-10-CM

## 2024-02-19 DIAGNOSIS — R63.5 WEIGHT GAIN: ICD-10-CM

## 2024-02-19 DIAGNOSIS — R79.89 OTHER SPECIFIED ABNORMAL FINDINGS OF BLOOD CHEMISTRY: ICD-10-CM

## 2024-02-19 DIAGNOSIS — G89.29 CHRONIC LOW BACK PAIN, UNSPECIFIED BACK PAIN LATERALITY, UNSPECIFIED WHETHER SCIATICA PRESENT: ICD-10-CM

## 2024-02-19 DIAGNOSIS — M54.50 CHRONIC LOW BACK PAIN, UNSPECIFIED BACK PAIN LATERALITY, UNSPECIFIED WHETHER SCIATICA PRESENT: ICD-10-CM

## 2024-02-19 PROCEDURE — 99214 OFFICE O/P EST MOD 30 MIN: CPT | Performed by: PHYSICIAN ASSISTANT

## 2024-02-19 PROCEDURE — 3074F SYST BP LT 130 MM HG: CPT | Performed by: PHYSICIAN ASSISTANT

## 2024-02-19 PROCEDURE — 3079F DIAST BP 80-89 MM HG: CPT | Performed by: PHYSICIAN ASSISTANT

## 2024-02-19 RX ORDER — ESCITALOPRAM OXALATE 20 MG/1
20 TABLET ORAL DAILY
COMMUNITY
Start: 2023-10-19 | End: 2024-02-19 | Stop reason: ALTCHOICE

## 2024-02-19 RX ORDER — BENZOCAINE/MENTHOL 6 MG-10 MG
LOZENGE MUCOUS MEMBRANE
COMMUNITY
Start: 2023-12-26 | End: 2024-02-19 | Stop reason: ALTCHOICE

## 2024-02-19 RX ORDER — CHLORHEXIDINE GLUCONATE ORAL RINSE 1.2 MG/ML
SOLUTION DENTAL
COMMUNITY
Start: 2024-01-20 | End: 2024-02-19 | Stop reason: ALTCHOICE

## 2024-02-19 RX ORDER — PRAZOSIN HYDROCHLORIDE 5 MG/1
CAPSULE ORAL
COMMUNITY
Start: 2024-01-30

## 2024-02-19 RX ORDER — PANTOPRAZOLE SODIUM 40 MG/1
40 TABLET, DELAYED RELEASE ORAL
Qty: 30 TABLET | Refills: 2 | Status: SHIPPED | OUTPATIENT
Start: 2024-02-19 | End: 2024-02-20

## 2024-02-19 RX ORDER — QUETIAPINE 150 MG/1
TABLET, FILM COATED, EXTENDED RELEASE ORAL
COMMUNITY
Start: 2023-12-11

## 2024-02-19 RX ORDER — TRAMADOL HYDROCHLORIDE 50 MG/1
TABLET ORAL
COMMUNITY
Start: 2024-01-29

## 2024-02-19 RX ORDER — MIRTAZAPINE 15 MG/1
TABLET, FILM COATED ORAL
COMMUNITY
Start: 2024-02-04

## 2024-02-19 RX ORDER — TOPIRAMATE 100 MG/1
TABLET, FILM COATED ORAL
COMMUNITY
Start: 2024-01-30

## 2024-02-19 RX ORDER — HYDROXYZINE PAMOATE 50 MG/1
50 CAPSULE ORAL 3 TIMES DAILY
COMMUNITY
Start: 2024-02-03

## 2024-02-19 RX ORDER — SERTRALINE HYDROCHLORIDE 100 MG/1
TABLET, FILM COATED ORAL
COMMUNITY
Start: 2024-01-30

## 2024-02-19 RX ORDER — QUETIAPINE FUMARATE 50 MG/1
50 TABLET, EXTENDED RELEASE ORAL
COMMUNITY
Start: 2024-02-05

## 2024-02-19 RX ORDER — AMOXICILLIN 250 MG
1 CAPSULE ORAL 2 TIMES DAILY
COMMUNITY
Start: 2024-02-18 | End: 2024-02-19 | Stop reason: ALTCHOICE

## 2024-02-19 RX ORDER — BUSPIRONE HYDROCHLORIDE 15 MG/1
TABLET ORAL
COMMUNITY
Start: 2024-01-31

## 2024-02-19 RX ORDER — POLYETHYLENE GLYCOL 3350 17 G/17G
17 POWDER, FOR SOLUTION ORAL DAILY
Qty: 510 G | Refills: 2 | Status: SHIPPED | OUTPATIENT
Start: 2024-02-19

## 2024-02-19 RX ORDER — POLYETHYLENE GLYCOL 3350 17 G/17G
POWDER, FOR SOLUTION ORAL
COMMUNITY
Start: 2024-01-11 | End: 2024-02-19 | Stop reason: SDUPTHER

## 2024-02-19 NOTE — PROGRESS NOTES
Name: Samantha Rodriguez      : 1965      MRN: [id number]  Encounter Provider: Rahel Verdugo PA-C  Encounter Date: 2024   Encounter department: [facility]    Assessment & Plan     1. GERD without esophagitis  Assessment & Plan:  I reviewed her ED visit from yesterday with the same complaints. CT abdomen was unremarkable. Patient believes it is her umbilical hernia that is causing all of her GI complaints. We did discuss that this is unlikely. Her CBC and CMP were relatively unremarkable, with a mildly elevated ALT and normal lipase. Will recheck CMP within a week.  History of GERD. Discussed possible causes of current complaints: to include but not limited to uncontrolled GERD, gastritis, PUD, gastroparesis. I do not believe she has had her pantoprazole as it has not been refills in 6 months. Restart pantoprazole 40  mg daily. Discussed anti-GERD diet. Avoid alcohol and caffeine. Avoid NSAIDs. ER precautions given. Follow up with GI asap. ER precautions given.     Orders:  -     pantoprazole (PROTONIX) 40 mg tablet; Take 1 tablet (40 mg total) by mouth daily in the early morning  -     Ambulatory Referral to Gastroenterology; Future    2. Other constipation  Assessment & Plan:  Constipation is likely multifactorial. Opiates likely contributing. Recommend miralax 17 g once daily. Increase hydration. Add more fiber to diet. Will consult GI.     Orders:  -     Comprehensive metabolic panel; Future  -     Celiac Disease Antibody Profile; Future  -     polyethylene glycol (GLYCOLAX) 17 GM/SCOOP powder; Take 17 g by mouth daily  -     Ambulatory Referral to Gastroenterology; Future    3. Weight gain  Assessment & Plan:  Medications likely contributing to weight gain. Encouraged healthy well balanced diet. Try to stay as active as possible. Will evaluate further with labs to include TSH and A1c.     Orders:  -     Hemoglobin A1C; Future  -     TSH, 3rd generation with Free  T4 reflex; Future    4. Chronic low back pain, unspecified back pain laterality, unspecified whether sciatica present  Assessment & Plan:  New back brace ordered.     Orders:  -     Lumbar Back Brace    5. Other specified abnormal findings of blood chemistry  -     Hemoglobin A1C; Future           Subjective      Patient is a 58-year-old female presenting for same-day appointment.  She is complaining of nausea, vomiting, and abdominal pain for 4 to 5 months.  States it is generalized abdominal pain.  She cannot describe it.  States it feels bloated and swollen.  She often has nausea and vomits throughout the day.  She unsure how often.  States at nighttime she vomits when she lays down as well (from her description sounds more like reflux).  Denies heartburn.  Admits to decreased appetite.  States every time she tries to eat, she feels like she has to throw up.  Denies diarrhea.  She admits to constipation.  She has 1 bowel movement every 1 to 2 weeks.  Denies hematochezia or melena.  Denies dysuria, frequency, or urgency.  Denies any menstrual complaints or vaginal discharge.  She thinks she has been gaining weight.  States she was 168 several months ago, 190 today.  Denies changes in activity level or diet.  She states she also used to have a back brace, but [facility] threw it out.  She is requesting a new prescription.      Review of Systems   Constitutional:  Positive for appetite change and unexpected weight change. Negative for chills, diaphoresis, fatigue and fever.   HENT:  Negative for congestion, sore throat and trouble swallowing.    Respiratory:  Negative for cough, chest tightness, shortness of breath and wheezing.    Cardiovascular:  Negative for chest pain, palpitations and leg swelling.   Gastrointestinal:  Positive for abdominal distention, abdominal pain, constipation, nausea and vomiting. Negative for blood in stool and diarrhea.   Genitourinary:  Negative for decreased urine volume, difficulty  urinating, dysuria, flank pain, frequency, pelvic pain and urgency.   Musculoskeletal:  Positive for arthralgias, back pain and gait problem.        Chronic   Skin:  Negative for rash.   Neurological:  Negative for dizziness, weakness, light-headedness and headaches.   Hematological:  Negative for adenopathy.       Current Outpatient Medications on File Prior to Visit   Medication Sig    busPIRone (BUSPAR) 15 mg tablet     hydrOXYzine pamoate (VISTARIL) 50 mg capsule Take 50 mg by mouth 3 (three) times a day    mirtazapine (REMERON) 15 mg tablet TAKE 1/2 TAB (7.5MG) AT BEDTIME    prazosin (MINIPRESS) 5 mg capsule     QUEtiapine (SEROquel XR) 150 mg 24 hr tablet     QUEtiapine (SEROquel XR) 50 mg Take 50 mg by mouth daily at bedtime    sertraline (ZOLOFT) 100 mg tablet     topiramate (TOPAMAX) 100 mg tablet     traMADol (ULTRAM) 50 mg tablet     [DISCONTINUED] chlorhexidine (PERIDEX) 0.12 % solution     [DISCONTINUED] escitalopram (LEXAPRO) 20 mg tablet Take 20 mg by mouth daily    [DISCONTINUED] hydrocortisone 1 % cream     [DISCONTINUED] polyethylene glycol (GLYCOLAX) 17 GM/SCOOP powder     [DISCONTINUED] senna-docusate sodium (SENOKOT S) 8.6-50 mg per tablet Take 1 tablet by mouth 2 (two) times a day    acetaminophen (TYLENOL) 500 mg tablet Take 1 tablet (500 mg total) by mouth every 8 (eight) hours as needed for mild pain    aspirin (Aspirin Low Dose) 81 mg EC tablet Take 1 tablet (81 mg total) by mouth daily    atorvastatin (LIPITOR) 40 mg tablet Take 1 tablet (40 mg total) by mouth daily    cholecalciferol (VITAMIN D3) 1,000 units tablet Take 1 tablet (1,000 Units total) by mouth daily Do not start before December 10, 2023.    clonazePAM (KlonoPIN) 1 mg tablet Take 1 tablet (1 mg total) by mouth 2 (two) times a day for 10 days    Diclofenac Sodium (VOLTAREN) 1 % Apply 2 g topically 4 (four) times a day as needed (pain)    Ingrezza 40 MG CAPS Take 40 mg by mouth in the morning    melatonin 3 mg Take 2 tablets (6  "mg total) by mouth daily at bedtime    pregabalin (LYRICA) 100 mg capsule Take 1 capsule (100 mg total) by mouth 3 (three) times a day for 10 days    [DISCONTINUED] amLODIPine (NORVASC) 5 mg tablet Take 1 tablet (5 mg total) by mouth daily Do not start before November 10, 2023.    [DISCONTINUED] cyanocobalamin 1,000 mcg/mL Inject 1 mL (1,000 mcg total) into a muscle every 30 (thirty) days Do not start before November 20, 2023.    [DISCONTINUED] ergocalciferol (VITAMIN D2) 50,000 units Take 1 capsule (50,000 Units total) by mouth once a week for 5 doses Do not start before November 11, 2023.    [DISCONTINUED] ferrous sulfate 324 (65 Fe) mg Take 1 tablet (324 mg total) by mouth every other day    [DISCONTINUED] hydrochlorothiazide (HYDRODIURIL) 12.5 mg tablet Take 1 tablet (12.5 mg total) by mouth daily Do not start before November 10, 2023.    [DISCONTINUED] hydrOXYzine HCL (ATARAX) 25 mg tablet Take 1 tablet (25 mg total) by mouth in the morning    [DISCONTINUED] loratadine (CLARITIN) 10 mg tablet Take 1 tablet (10 mg total) by mouth daily Do not start before November 10, 2023.    [DISCONTINUED] pantoprazole (PROTONIX) 40 mg tablet Take 1 tablet (40 mg total) by mouth daily in the early morning    [DISCONTINUED] prazosin (MINIPRESS) 1 mg capsule Take 1 capsule (1 mg total) by mouth daily at bedtime    [DISCONTINUED] QUEtiapine (SEROquel XR) 200 mg 24 hr tablet Take 1 tablet (200 mg total) by mouth daily at bedtime    [DISCONTINUED] sertraline (ZOLOFT) 50 mg tablet Take 3 tablets (150 mg total) by mouth daily Do not start before November 10, 2023.       Objective     /85 (BP Location: Left arm, Patient Position: Sitting, Cuff Size: Large)   Pulse 61   Temp 97.6 °F (36.4 °C) (Temporal)   Resp 16   Ht 5' 1\" (1.549 m)   Wt 86.5 kg (190 lb 9.6 oz)   SpO2 99%   BMI 36.01 kg/m²     Physical Exam  Vitals and nursing note reviewed.   Constitutional:       General: She is awake. She is not in acute distress.     " Appearance: Normal appearance. She is well-developed and well-groomed. She is obese. She is not ill-appearing.   HENT:      Head: Normocephalic and atraumatic.      Right Ear: Tympanic membrane, ear canal and external ear normal.      Left Ear: Tympanic membrane, ear canal and external ear normal.      Nose: Nose normal.      Mouth/Throat:      Mouth: Mucous membranes are moist.      Pharynx: Oropharynx is clear. No oropharyngeal exudate or posterior oropharyngeal erythema.   Eyes:      General: No scleral icterus.     Conjunctiva/sclera: Conjunctivae normal.   Cardiovascular:      Rate and Rhythm: Normal rate and regular rhythm.      Pulses: Normal pulses.           Radial pulses are 2+ on the right side and 2+ on the left side.      Heart sounds: Normal heart sounds. No murmur heard.  Pulmonary:      Effort: Pulmonary effort is normal. No respiratory distress.      Breath sounds: Normal breath sounds and air entry. No decreased air movement. No decreased breath sounds, wheezing, rhonchi or rales.   Abdominal:      General: A surgical scar is present. Bowel sounds are normal. There is no distension.      Palpations: Abdomen is soft. There is no hepatomegaly or mass.      Tenderness: There is no abdominal tenderness. There is no right CVA tenderness, left CVA tenderness, guarding or rebound. Negative signs include Grover's sign, Rovsing's sign and McBurney's sign.      Hernia: A hernia is present. Hernia is present in the umbilical area.   Musculoskeletal:         General: Normal range of motion.      Cervical back: Neck supple.      Right lower leg: No edema.      Left lower leg: No edema.   Lymphadenopathy:      Cervical: No cervical adenopathy.   Skin:     General: Skin is warm.      Coloration: Skin is not jaundiced.      Findings: No rash.   Neurological:      General: No focal deficit present.      Mental Status: She is alert and oriented to person, place, and time. Mental status is at baseline.      Motor:  Motor function is intact.      Coordination: Coordination is intact.      Gait: Gait abnormal (uses walker).   Psychiatric:         Attention and Perception: Attention normal.         Mood and Affect: Mood and affect normal.         Speech: Speech normal.         Behavior: Behavior normal. Behavior is cooperative.       Rahel Verdugo PA-C

## 2024-02-19 NOTE — ASSESSMENT & PLAN NOTE
I reviewed her ED visit from yesterday with the same complaints. CT abdomen was unremarkable. Patient believes it is her umbilical hernia that is causing all of her GI complaints. We did discuss that this is unlikely. Her CBC and CMP were relatively unremarkable, with a mildly elevated ALT and normal lipase. Will recheck CMP within a week.  History of GERD. Discussed possible causes of current complaints: to include but not limited to uncontrolled GERD, gastritis, PUD, gastroparesis. I do not believe she has had her pantoprazole as it has not been refills in 6 months. Restart pantoprazole 40  mg daily. Discussed anti-GERD diet. Avoid alcohol and caffeine. Avoid NSAIDs. ER precautions given. Follow up with GI asap. ER precautions given.

## 2024-02-19 NOTE — ASSESSMENT & PLAN NOTE
Constipation is likely multifactorial. Opiates likely contributing. Recommend miralax 17 g once daily. Increase hydration. Add more fiber to diet. Will consult GI.

## 2024-02-19 NOTE — ASSESSMENT & PLAN NOTE
Medications likely contributing to weight gain. Encouraged healthy well balanced diet. Try to stay as active as possible. Will evaluate further with labs to include TSH and A1c.

## 2024-02-20 ENCOUNTER — TELEPHONE (OUTPATIENT)
Dept: INTERNAL MEDICINE CLINIC | Facility: CLINIC | Age: 59
End: 2024-02-20

## 2024-02-20 ENCOUNTER — TELEPHONE (OUTPATIENT)
Dept: GASTROENTEROLOGY | Facility: MEDICAL CENTER | Age: 59
End: 2024-02-20

## 2024-02-20 ENCOUNTER — OFFICE VISIT (OUTPATIENT)
Dept: GASTROENTEROLOGY | Facility: MEDICAL CENTER | Age: 59
End: 2024-02-20
Payer: MEDICARE

## 2024-02-20 ENCOUNTER — APPOINTMENT (OUTPATIENT)
Dept: LAB | Facility: MEDICAL CENTER | Age: 59
End: 2024-02-20
Payer: MEDICARE

## 2024-02-20 ENCOUNTER — TELEPHONE (OUTPATIENT)
Age: 59
End: 2024-02-20

## 2024-02-20 VITALS
HEART RATE: 71 BPM | BODY MASS INDEX: 36.1 KG/M2 | SYSTOLIC BLOOD PRESSURE: 130 MMHG | DIASTOLIC BLOOD PRESSURE: 88 MMHG | HEIGHT: 61 IN | WEIGHT: 191.2 LBS | TEMPERATURE: 98.3 F

## 2024-02-20 DIAGNOSIS — R10.84 GENERALIZED ABDOMINAL PAIN: ICD-10-CM

## 2024-02-20 DIAGNOSIS — Z80.0 FAMILY HISTORY OF COLON CANCER: Primary | ICD-10-CM

## 2024-02-20 DIAGNOSIS — K21.9 GERD WITHOUT ESOPHAGITIS: ICD-10-CM

## 2024-02-20 DIAGNOSIS — R79.89 OTHER SPECIFIED ABNORMAL FINDINGS OF BLOOD CHEMISTRY: ICD-10-CM

## 2024-02-20 DIAGNOSIS — K59.09 OTHER CONSTIPATION: ICD-10-CM

## 2024-02-20 DIAGNOSIS — R63.5 WEIGHT GAIN: ICD-10-CM

## 2024-02-20 LAB
ALBUMIN SERPL BCP-MCNC: 4.3 G/DL (ref 3.5–5)
ALP SERPL-CCNC: 102 U/L (ref 34–104)
ALT SERPL W P-5'-P-CCNC: 53 U/L (ref 7–52)
ANION GAP SERPL CALCULATED.3IONS-SCNC: 8 MMOL/L
AST SERPL W P-5'-P-CCNC: 30 U/L (ref 13–39)
BILIRUB SERPL-MCNC: 0.6 MG/DL (ref 0.2–1)
BUN SERPL-MCNC: 8 MG/DL (ref 5–25)
CALCIUM SERPL-MCNC: 8.8 MG/DL (ref 8.4–10.2)
CHLORIDE SERPL-SCNC: 109 MMOL/L (ref 96–108)
CO2 SERPL-SCNC: 24 MMOL/L (ref 21–32)
CREAT SERPL-MCNC: 0.85 MG/DL (ref 0.6–1.3)
GFR SERPL CREATININE-BSD FRML MDRD: 74 ML/MIN/1.73SQ M
GLUCOSE SERPL-MCNC: 73 MG/DL (ref 65–140)
POTASSIUM SERPL-SCNC: 3.5 MMOL/L (ref 3.5–5.3)
PROT SERPL-MCNC: 6.8 G/DL (ref 6.4–8.4)
SODIUM SERPL-SCNC: 141 MMOL/L (ref 135–147)
TSH SERPL DL<=0.05 MIU/L-ACNC: 1.75 UIU/ML (ref 0.45–4.5)

## 2024-02-20 PROCEDURE — 83036 HEMOGLOBIN GLYCOSYLATED A1C: CPT

## 2024-02-20 PROCEDURE — 84443 ASSAY THYROID STIM HORMONE: CPT

## 2024-02-20 PROCEDURE — 80053 COMPREHEN METABOLIC PANEL: CPT

## 2024-02-20 PROCEDURE — 86364 TISS TRNSGLTMNASE EA IG CLAS: CPT

## 2024-02-20 PROCEDURE — 36415 COLL VENOUS BLD VENIPUNCTURE: CPT

## 2024-02-20 PROCEDURE — 86231 EMA EACH IG CLASS: CPT

## 2024-02-20 PROCEDURE — 82784 ASSAY IGA/IGD/IGG/IGM EACH: CPT

## 2024-02-20 PROCEDURE — 86258 DGP ANTIBODY EACH IG CLASS: CPT

## 2024-02-20 PROCEDURE — 99204 OFFICE O/P NEW MOD 45 MIN: CPT | Performed by: INTERNAL MEDICINE

## 2024-02-20 RX ORDER — FAMOTIDINE 20 MG/1
40 TABLET, FILM COATED ORAL
Qty: 60 TABLET | Refills: 3 | Status: SHIPPED | OUTPATIENT
Start: 2024-02-20

## 2024-02-20 RX ORDER — PANTOPRAZOLE SODIUM 40 MG/1
40 TABLET, DELAYED RELEASE ORAL
Qty: 60 TABLET | Refills: 2 | Status: SHIPPED | OUTPATIENT
Start: 2024-02-20

## 2024-02-20 RX ORDER — LINACLOTIDE 290 UG/1
290 CAPSULE, GELATIN COATED ORAL
Qty: 30 CAPSULE | Refills: 3 | Status: SHIPPED | OUTPATIENT
Start: 2024-02-20

## 2024-02-20 RX ORDER — PANTOPRAZOLE SODIUM 40 MG/1
40 TABLET, DELAYED RELEASE ORAL
Qty: 60 TABLET | Refills: 2 | Status: CANCELLED | OUTPATIENT
Start: 2024-02-20

## 2024-02-20 NOTE — TELEPHONE ENCOUNTER
Procedure: EGD/Colonoscopy  Date: 03/20/2024  Physician performing: Dr. Rhodes  Location of procedure:    Instructions given to patient: Yes  Diabetic: No  Clearances: No

## 2024-02-20 NOTE — PROGRESS NOTES
[facility] - Outpatient Consultation  Samantha Rodriguez 58 y.o. female MRN: [id number]  Encounter: [id number]          ASSESSMENT AND PLAN:   58-year-old female with history of GERD, hypertension, CVA,, hyperlipidemia who presents for evaluation.    1. GERD without esophagitis  2. Other constipation  3. Generalized abdominal pain  4. Family history of colon cancer  She has a history of chronic reflux symptoms and previously underwent EGD in 2020 showing a tortuous esophagus, otherwise unremarkable.  She then underwent pH testing and manometry testing showing normal esophageal motility and pH testing was positive for acid reflux.  She is taking pantoprazole once daily with poorly controlled reflux symptoms.  I recommend increasing her pantoprazole to twice daily and adding famotidine 40 mg at night.  Repeat EGD will be scheduled for evaluation given her persistent symptoms    She reports chronic constipation with a bowel movement usually every few weeks.  I recommend drinking a GoLytely bowel preparation at home for a bowel cleanse and then provided her samples of Linzess 290 mcg daily and have sent a prescription to the pharmacy.    Her last colonoscopy in 2014 showed 1 small polyp and a poor prep and she was recommended repeat in 5 years.  She has a family history of 2 first-degree relatives, her mother and brother who are both diagnosed with colon cancer at age 60 or younger.  I discussed the indication, risk and benefit of colonoscopy with her today and she is agreeable to proceed    - famotidine (PEPCID) 20 mg tablet; Take 2 tablets (40 mg total) by mouth daily at bedtime  Dispense: 60 tablet; Refill: 3  - pantoprazole (PROTONIX) 40 mg tablet; Take 1 tablet (40 mg total) by mouth 2 (two) times a day before meals  Dispense: 60 tablet; Refill: 2  - polyethylene glycol (GOLYTELY) 4000 mL solution; Take as instructed on bowel preparation instruction  Dispense: 4000 mL; Refill: 0  -  "Colonoscopy; Future  - EGD; Future  - polyethylene glycol (GOLYTELY) 4000 mL solution; Take as instructed on bowel preparation instructions  Dispense: 4000 mL; Refill: 0          ______________________________________________________________________    HPI:  58-year-old female with history of GERD, hypertension, CVA,, hyperlipidemia who presents for evaluation.      She was previously seen in the GI office in [place] in 2020 for history of dysphagia and reflux.  She underwent EGD September 2020 showing a tortuous esophagus, prominent bulge in the upper esophagus moderate gastritis otherwise normal.  Gastric biopsy showed gastritis negative for H. pylori and proximal and distal esophageal biopsies were unremarkable.  She subsequently underwent manometry and 24-hour pH testing showing normal esophageal motility, overall acid exposure time of 14%, DeMeester score 49, positive symptom correlation and overall positive study for acid reflux    Interval history: She reports regurgitation of acid at night into her throat/mouth with a sour taste.  She reports a \"choking sensation due to this.  She takes Protonix 40 mg once a day but continues to have acid reflux symptoms.  She also reports infrequent bowel movement usually going 2 to 3 weeks without a bowel movement.  She is taken MiraLAX every day with no improvement of her symptoms.  When she has a bowel movement it is a small caliber and Heber type I stool.  Her appetite is low.  She reports a 30 pound weight gain in the last several months    She reports a family history of her brother disease with stage IV colon cancer at age 52 and mother with history of colon cancer diagnosed at age 61    She believes she had an endoscopy many years ago but is unsure of the results    2/23 CT abdomen pelvis with small ubilic hernia    Prior EGD/colonoscopy 2014 EGD was normal  2014 colonoscopy showed 1 small polyp and a poor prep and she was recommended repeat in 5 " years    REVIEW OF SYSTEMS:    CONSTITUTIONAL: Denies any fever, chills, rigors, and weight loss.  HEENT: No earache or tinnitus. Denies hearing loss or visual disturbances.  CARDIOVASCULAR: No chest pain or palpitations.   RESPIRATORY: Denies any cough, hemoptysis, shortness of breath or dyspnea on exertion.  GASTROINTESTINAL: As noted in the History of Present Illness.   GENITOURINARY: No problems with urination. Denies any hematuria or dysuria.  NEUROLOGIC: No dizziness or vertigo, denies headaches.   MUSCULOSKELETAL: Denies any muscle or joint pain.   SKIN: Denies skin rashes or itching.   ENDOCRINE: Denies excessive thirst. Denies intolerance to heat or cold.  PSYCHOSOCIAL: Denies depression or anxiety. Denies any recent memory loss.       Historical Information   Past Medical History:   Diagnosis Date    Anxiety     Arthritis     left knee    Arthritis of right knee 10/6/2020    At risk for falls     Bipolar 2 disorder (HCC)     FOLLOWS WITH PSYCHIATRIST. CONTINUE LAMOTRIGINE; RESOLVED: [id number]    Depression     Familial tremor     both hands    Fibromyalgia     LAST ASSESSED: [id number]    GERD (gastroesophageal reflux disease)     Hearing aid worn     left ear    [place] (hard of hearing)     left ear    Hyperlipidemia     Hypertension     Left-sided weakness     Lumbar disc disease with radiculopathy 2/2/2018    Memory loss of unknown cause     long and short term    Migraine     Multiple closed fractures of metatarsal bone of right foot 5/2/2021    Obesity     Obesity, Class II, BMI 35-39.9     Osteoarthritis of both hips 10/31/2016    Osteoarthritis of knee 2/20/2013    Description: Continue Tylenol and Naproxen. Encourage exercise and weight loss. Patient refused physical therapy. I will refer the patient back to Orthopedics.    Overactive bladder     Panic attack     Patellofemoral disorder of both knees 5/1/2020    Post traumatic stress disorder     Primary localized osteoarthritis of both knees 6/16/2017     Primary osteoarthritis of both knees 2020    S/P insertion of spinal cord stimulator     no remote    S/P total knee arthroplasty, right 3/10/2022    Sacroiliitis (HCC) 2017    Seasonal allergies     Seizure-like activity (HCC) 6/3/2022    Seizures (HCC)     possible seizure like activity    Small bowel obstruction (HCC) 3/24/2023    Status post total knee replacement, left 2022    Stroke (HCC)     questionable stroke     Tardive dyskinesia     PATIENT STATES    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  CONT ASA 81 MG DAILY; RESOLVED: [id number]    Urinary incontinence     Uses walker     Wears dentures     partial lower / full upper- doesnt wear    Wears glasses      Past Surgical History:   Procedure Laterality Date    BACK SURGERY       SECTION      COLONOSCOPY      RESOLVED: 2016    EAR SURGERY      EGD      HYSTERECTOMY      MYRINGOTOMY W/ TUBES Left     NECK SURGERY  2019    AR ARTHRP KNE CONDYLE&PLATU MEDIAL&LAT COMPARTMENTS Right 3/9/2022    Procedure: ARTHROPLASTY KNEE TOTAL;  Surgeon: Chandni Tuttle DO;  Location: [facility];  Service: Orthopedics    AR ARTHRP KNE CONDYLE&PLATU MEDIAL&LAT COMPARTMENTS Left 2022    Procedure: ARTHROPLASTY KNEE TOTAL;  Surgeon: Chandni Tuttle DO;  Location: [facility];  Service: Orthopedics    AR CYSTOURETHROSCOPY N/A 2016    Procedure: CYSTOSCOPY, BOTOX INJECTION;  Surgeon: Abraham Teague MD;  Location: [facility];  Service: Gynecology    AR INSJ/RPLCMT SPINAL NPG/RCVR POCKET CRTJ&CONNJ Right 2/10/2021    Procedure: REPLACEMENT IMPLANTABLE PULSE GENERATOR DORSAL SPINAL COLUMN STIMULATOR, RIGHT;  Surgeon: Carlos Alberto Jacome MD;  Location: [facility];  Service: Neurosurgery    AR PRQ IMPLTJ NSTIM ELECTRODE ARRAY EPIDURAL Right 2020    Procedure: INSERTION THORACIC DORSAL COLUMN SPINAL CORD STIMULATOR PERCUTANEOUS W IMPLANTABLE PULSE GENERATOR, RIGHT;  Surgeon: Carlos Alberto Jacome MD;   "Location:  MAIN OR;  Service: Neurosurgery    TONSILLECTOMY      TUBAL LIGATION  1986    UPPER GASTROINTESTINAL ENDOSCOPY  09/2020     Social History   Social History     Substance and Sexual Activity   Alcohol Use Not Currently    Comment: 2 x year; being a social drinker as per all scripts      Social History     Substance and Sexual Activity   Drug Use No     Social History     Tobacco Use   Smoking Status Never   Smokeless Tobacco Never     Family History   Problem Relation Age of Onset    Colon cancer Mother     Alzheimer's disease Father     Stroke Father     Colon cancer Brother     Bipolar disorder Brother     Breast cancer Maternal Aunt     Colon cancer Maternal Aunt     Heart disease Other     Diabetes Other     Hypertension Other     Seizures Son     Depression Paternal Grandfather     No Known Problems Sister     No Known Problems Brother     Thyroid disease Neg Hx        Meds/Allergies       Current Outpatient Medications:     acetaminophen (TYLENOL) 500 mg tablet    aspirin (Aspirin Low Dose) 81 mg EC tablet    atorvastatin (LIPITOR) 40 mg tablet    busPIRone (BUSPAR) 15 mg tablet    cholecalciferol (VITAMIN D3) 1,000 units tablet    clonazePAM (KlonoPIN) 1 mg tablet    Diclofenac Sodium (VOLTAREN) 1 %    hydrOXYzine pamoate (VISTARIL) 50 mg capsule    Ingrezza 40 MG CAPS    melatonin 3 mg    mirtazapine (REMERON) 15 mg tablet    pantoprazole (PROTONIX) 40 mg tablet    polyethylene glycol (GLYCOLAX) 17 GM/SCOOP powder    prazosin (MINIPRESS) 5 mg capsule    pregabalin (LYRICA) 100 mg capsule    QUEtiapine (SEROquel XR) 150 mg 24 hr tablet    QUEtiapine (SEROquel XR) 50 mg    sertraline (ZOLOFT) 100 mg tablet    topiramate (TOPAMAX) 100 mg tablet    traMADol (ULTRAM) 50 mg tablet    No Known Allergies        Objective     Blood pressure 130/88, pulse 71, temperature 98.3 °F (36.8 °C), temperature source Tympanic, height 5' 1\" (1.549 m), weight 86.7 kg (191 lb 3.2 oz), not currently breastfeeding. Body " mass index is 36.13 kg/m².        PHYSICAL EXAM:      General Appearance:   Alert, cooperative, no distress   HEENT:   Normocephalic, atraumatic, anicteric.     Neck:  Supple, symmetrical, trachea midline   Lungs:   Clear to auscultation bilaterally; no rales, rhonchi or wheezing; respirations unlabored    Heart::   Regular rate and rhythm; no murmur, rub, or gallop.   Abdomen:   Soft, generalized abdominal tenderness to deep palpation without rebound or guarding, non-distended; normal bowel sounds; no masses, no organomegaly    Genitalia:   Deferred    Rectal:   Deferred    Extremities:  No cyanosis, clubbing or edema    Pulses:  2+ and symmetric    Skin:  No jaundice, rashes, or lesions    Lymph nodes:  No palpable cervical lymphadenopathy        Lab Results:   No visits with results within 1 Day(s) from this visit.   Latest known visit with results is:   Lab Requisition on 11/30/2023   Component Date Value    Hemoglobin A1C 11/30/2023 4.9     EAG 11/30/2023 94     Cholesterol 11/30/2023 115     Triglycerides 11/30/2023 77     HDL, Direct 11/30/2023 50     LDL Calculated 11/30/2023 50     Non-HDL-Chol (CHOL-HDL) 11/30/2023 65          Radiology Results:   CTA abdomen pelvis w wo contrast    Result Date: 2/18/2024  Narrative: History: Abdominal pain.   Exam: CT of the abdomen and pelvis with IV contrast.   Technique: Using helical technique, axial images were obtained through the abdomen and pelvis following administration of 75 cc of Omnipaque 350 intravenous contrast. Coronal and sagittal reformations were performed.   Comparison: Study of 5/9/2022     Findings: Lung Bases: Clear lung bases. Abdomen: Liver: Homogeneous liver parenchyma. Gallbladder/Bile ducts: Unremarkable. Pancreas: Normal in appearance. Spleen: Normal in size without focal lesion. Adrenal glands: Normal in appearance. Kidneys/ureters: Enhance symmetrically without hydronephrosis. Peritoneum: No ascites. Bowel/Mesentery: There is no bowel  obstruction. No mesenteric mass present. Appendix: Normal appendix in the right lower quadrant Vessels: Normal caliber abdominal aorta. Lymph nodes: No adenopathy in the abdomen. Abdominal Wall: A tiny fat-containing umbilical hernia is stable. Patient may have undergone prior mesh repair for an infraumbilical hernia. There is no definite recurrence. There is stable mild tethering of the small bowel loops to the lower anterior abdominal wall on image 107. No bowel obstruction present   Pelvis: Uterus and adnexa are unremarkable Urinary Bladder: No asymmetric bladder wall thickening present. Lymph nodes:  No adenopathy in the pelvis. Bones: There are stable extensive post surgical changes in the thoracic and lumbar spine. Thoracic spinal stimulator is partially visualized      Impression: Impression: 1.  No acute findings in the abdomen or pelvis. 2.  Stable tiny fat-containing umbilical hernia. Workstation:[id number]

## 2024-02-21 LAB
EST. AVERAGE GLUCOSE BLD GHB EST-MCNC: 103 MG/DL
HBA1C MFR BLD: 5.2 %

## 2024-02-22 ENCOUNTER — TELEPHONE (OUTPATIENT)
Dept: GASTROENTEROLOGY | Facility: CLINIC | Age: 59
End: 2024-02-22

## 2024-02-22 DIAGNOSIS — I69.359 HEMIPLEGIA, POST-STROKE (HCC): ICD-10-CM

## 2024-02-22 DIAGNOSIS — Z96.89 STATUS POST INSERTION OF SPINAL CORD STIMULATOR: ICD-10-CM

## 2024-02-22 DIAGNOSIS — M47.816 LUMBAR SPONDYLOSIS: ICD-10-CM

## 2024-02-22 DIAGNOSIS — M48.062 SPINAL STENOSIS OF LUMBAR REGION WITH NEUROGENIC CLAUDICATION: ICD-10-CM

## 2024-02-22 DIAGNOSIS — M54.16 LUMBAR RADICULOPATHY: Primary | ICD-10-CM

## 2024-02-22 DIAGNOSIS — M96.1 POST LAMINECTOMY SYNDROME: ICD-10-CM

## 2024-02-22 LAB
ENDOMYSIUM IGA SER QL: NEGATIVE
GLIADIN PEPTIDE IGA SER-ACNC: 7 UNITS (ref 0–19)
GLIADIN PEPTIDE IGG SER-ACNC: 3 UNITS (ref 0–19)
IGA SERPL-MCNC: 226 MG/DL (ref 87–352)
TTG IGA SER-ACNC: <2 U/ML (ref 0–3)
TTG IGG SER-ACNC: 2 U/ML (ref 0–5)

## 2024-02-22 NOTE — TELEPHONE ENCOUNTER
Patients GI provider:  Dr. Jaiyeola    Number to return call: [phone]    Reason for call: Pt calling stating Dr. Jaiyeola ordered her a back brace at recent apt to help with back pain. Pt states insurance will not cover the brace with a sx of pain she has to order it for another reason.     Scheduled procedure/appointment date if applicable: procedure 3/20

## 2024-02-22 NOTE — TELEPHONE ENCOUNTER
I reordered it with other diagnoses if we can fax it one more time to [facility]. If still not covered, she may have to pay out of pocket. I placed the script in the fax bin. Thank you

## 2024-02-23 ENCOUNTER — TELEPHONE (OUTPATIENT)
Dept: INTERNAL MEDICINE CLINIC | Facility: CLINIC | Age: 59
End: 2024-02-23

## 2024-02-26 NOTE — TELEPHONE ENCOUNTER
Order for back brace faxed to [facility]   Pt states that she needs to use the restroom. Placed on the bedpan.

## 2024-02-27 ENCOUNTER — TELEPHONE (OUTPATIENT)
Dept: INTERNAL MEDICINE CLINIC | Facility: CLINIC | Age: 59
End: 2024-02-27

## 2024-02-27 NOTE — TELEPHONE ENCOUNTER
Patient left voicemail message for the status of the labs and that she is having stomach pain and states she is gaining weight

## 2024-02-27 NOTE — TELEPHONE ENCOUNTER
"Received fax from [employer].    \"We received script for an LSO for a patient. We need a valid dx code other than pain. Please fax the updated script and chart notes to our office in order to proceed. If you have any questions please contact at [phone].\"    Fax updated script to [phone]  "

## 2024-02-28 ENCOUNTER — TELEPHONE (OUTPATIENT)
Dept: INTERNAL MEDICINE CLINIC | Facility: CLINIC | Age: 59
End: 2024-02-28

## 2024-02-28 NOTE — TELEPHONE ENCOUNTER
"Received call from patient Samantha. Patient requesting lab results and stating she still has pain/stomach is \"blowing up.\" Patient aware that she has an appointment scheduled for this Monday 3/4/24 at 1000. Patient stating she is okay at home and taking medications as prescribed.     Please follow up with patient regarding lab work/results.   "

## 2024-02-28 NOTE — TELEPHONE ENCOUNTER
I called the patient. No answer, so I left a voicemail. Will review labs with patient at office visit.

## 2024-02-29 ENCOUNTER — TELEPHONE (OUTPATIENT)
Dept: NEUROSURGERY | Facility: CLINIC | Age: 59
End: 2024-02-29

## 2024-02-29 ENCOUNTER — TELEPHONE (OUTPATIENT)
Age: 59
End: 2024-02-29

## 2024-02-29 NOTE — TELEPHONE ENCOUNTER
Received fax from [facility] requesting [facility]     Sent fax to [facility] with document and sent to [facility] for scan

## 2024-02-29 NOTE — TELEPHONE ENCOUNTER
Caller: Patient     Doctor/Office: Neurology     Call regarding :  Looking to speak with Neurology      Call was transferred to: Neurology

## 2024-03-02 NOTE — TELEPHONE ENCOUNTER
02-, 1:37:16 pm EST    Patient called in to neurology office and left . She is trying to get in touch with Dr. Jacome regarding her spinal cord stimulator. She is in need of another remote and the company called [employer] wants to know if the provider can fill out paperwork.     Patient would appreciate a call back     #: [phone]    Chiqui - I was not sure which pool to route this to. Can you assist? Thank you!

## 2024-03-04 ENCOUNTER — OFFICE VISIT (OUTPATIENT)
Dept: INTERNAL MEDICINE CLINIC | Facility: CLINIC | Age: 59
End: 2024-03-04

## 2024-03-04 ENCOUNTER — PATIENT OUTREACH (OUTPATIENT)
Dept: INTERNAL MEDICINE CLINIC | Facility: CLINIC | Age: 59
End: 2024-03-04

## 2024-03-04 ENCOUNTER — TELEPHONE (OUTPATIENT)
Dept: INTERNAL MEDICINE CLINIC | Facility: CLINIC | Age: 59
End: 2024-03-04

## 2024-03-04 ENCOUNTER — TELEPHONE (OUTPATIENT)
Dept: GASTROENTEROLOGY | Facility: CLINIC | Age: 59
End: 2024-03-04

## 2024-03-04 VITALS
HEART RATE: 75 BPM | BODY MASS INDEX: 34.93 KG/M2 | DIASTOLIC BLOOD PRESSURE: 84 MMHG | SYSTOLIC BLOOD PRESSURE: 135 MMHG | WEIGHT: 185 LBS | HEIGHT: 61 IN | TEMPERATURE: 98.1 F

## 2024-03-04 DIAGNOSIS — F41.0 PANIC DISORDER WITHOUT AGORAPHOBIA: ICD-10-CM

## 2024-03-04 DIAGNOSIS — R14.0 BLOATING: Primary | ICD-10-CM

## 2024-03-04 DIAGNOSIS — G24.01 TARDIVE DYSKINESIA: ICD-10-CM

## 2024-03-04 DIAGNOSIS — Z71.89 COMPLEX CARE COORDINATION: Primary | ICD-10-CM

## 2024-03-04 DIAGNOSIS — M54.50 CHRONIC LOW BACK PAIN, UNSPECIFIED BACK PAIN LATERALITY, UNSPECIFIED WHETHER SCIATICA PRESENT: ICD-10-CM

## 2024-03-04 DIAGNOSIS — K59.09 OTHER CONSTIPATION: ICD-10-CM

## 2024-03-04 DIAGNOSIS — K21.9 GERD WITHOUT ESOPHAGITIS: ICD-10-CM

## 2024-03-04 DIAGNOSIS — I63.9 CEREBROVASCULAR ACCIDENT (CVA), UNSPECIFIED MECHANISM (HCC): ICD-10-CM

## 2024-03-04 DIAGNOSIS — G89.29 CHRONIC LOW BACK PAIN, UNSPECIFIED BACK PAIN LATERALITY, UNSPECIFIED WHETHER SCIATICA PRESENT: ICD-10-CM

## 2024-03-04 DIAGNOSIS — I10 ESSENTIAL HYPERTENSION: ICD-10-CM

## 2024-03-04 RX ORDER — MAGNESIUM HYDROXIDE/ALUMINUM HYDROXICE/SIMETHICONE 120; 1200; 1200 MG/30ML; MG/30ML; MG/30ML
30 SUSPENSION ORAL EVERY 4 HOURS PRN
Status: DISCONTINUED | OUTPATIENT
Start: 2024-03-04 | End: 2024-03-14

## 2024-03-04 RX ORDER — AMLODIPINE BESYLATE 5 MG/1
5 TABLET ORAL DAILY
Qty: 30 TABLET | Refills: 2 | Status: SHIPPED | OUTPATIENT
Start: 2024-03-04 | End: 2024-06-02

## 2024-03-04 NOTE — PROGRESS NOTES
"  Formerly [facility], later [facility]  INTERNAL MEDICINE RESIDENCY    Clinic Visit Note  Samantha Rodriguez 58 y.o. female   MRN: [id number]    Assessment and Plan      Samantha Rodriguez is a 58-year-old female with a complex medical history as noted below who is now currently transitioning her primary care back to [facility] after briefly residing at [facility] in [place] (assisted living [facility]), but was displeased with the care and food there. She is now back living with her daughter and family.  Patient's office visits are often complicated as patient has an extensive medication list and usually does not bring her medications with her, even after being reminded numerous times that this is important for making medication changes.  She also does not recall her medications. Please see additional patient outreach notes by Renae Espitia RN and HIRAM Estrada. Please see below for problem specific assessment and plans and follow-up recommendations.     1. Bloating: Patient was recently seen by GI 2/20/24 for abdominal pain and constipation. She has chronic constipation and was previously reporting having a BM every \"few weeks,\" so she was prescribed a GoLytely bowel cleanse and Linzess 290 mcg daily. She completed the GoLyltely a few days ago and now reports liquid BM's. Educated and reassured patient about expected BM changes. She also reports bloating that is chronic in nature but has worsened recently. Reviewed recent celiac serologies from 2/20/24 which were negative. This could be 2/2 the recent bowel prep completion. She is scheduled for colonoscopy 3/20/24 and will continue taking Linzess. In the meantime, will prescribe Maalox and await colonoscopy results. If symptoms persist despite improving bowel movements, should consider future testing for SIBO.   -     aluminum-magnesium hydroxide-simethicone (MAALOX) oral suspension 30 mL    2. Other constipation: As mentioned above, continue Linzess and " plan for upcoming colonoscopy 3/20/24.     3. GERD without esophagitis: Improved at this time after recent medication changes by GI. Continue Pepcid 40 mg HS and Protonix 40 mg BID. EGD scheduled for 3/20/24.     4. Panic disorder without agoraphobia: History of panic disorder with frequent panic attacks following with psychiatry. Was last admitted to inpatient psuch at [facility] from 10/19 - 11/10/23 for anxiety with panic attacks where she was started on Zoloft, Seroquel, Klonopin and Atarax, and melatonin. Unable to complete med rec today because patient does not have medications with her and is unsure of medications. At this time, plan to continue current psych medications as mentioned above. Continue to follow with psychiatry.     5. Essential hypertension: History of hypertension previously controlled with amlodipine 5 mg daily.  Per chart review, amlodipine was recently discontinued at office visit 2/19/24 by Rahel Verdugo PA-C.  BP in office today is 135/84, suspect elevated BP secondary to discontinuation of antihypertensives. This was not addressed at today's office visit, I attempted to reach patient via phone call to discuss plan, however no answer.  Will send task message to call patient to inform her to restart amlodipine 5 mg daily and call with BPs in 1 week versus return for nursing visit for BP check in 1 week.  Will need to f/u at next office visit. Continue to discuss DASH diet and dietary sodium restrictions (<2 g per day), also increase dietary efforts and physical activity     6. Chronic low back pain, unspecified back pain laterality, unspecified whether sciatica present: Patient has a history of chronic pain and opioid dependence. S/p thoracolumbar decompression and fusion in 2018 in [place] with subsequent laminectomy syndrome, s/p thoracic spinal cord stimulator placement in July 2020. Analgesic regimen currently is Lyrica 100 mg TID, Voltaren gel, Tylenol 500 mg Q8H PRN, and  tramadol 50 mg Q6H PRN severe pain.     7. Cerebrovascular accident (CVA), unspecified mechanism (HCC): History of CVA with residual right-sided lower extremity weakness. Has participated in PT after her knee replacements and reports improvement in her muscle strength. Continue ASA 81 mg daily.     8. Tardive dyskinesia: Patient has a longstanding history of tardive dyskinesia 2/2 medication side effects in the past. Continue Ingrezza 40 mg daily.     Health Maintenance:  Labs: UTD; CBC, CMP, A1c, TSH all WNL in February 2024   Cancer Screening:   Cervical cancer screening:  Absent s/p hysterectomy              Breast cancer screening:  UTD; due for repeat mammogram 5/11/24               Colon cancer screening:  Colonoscopy scheduled for 3/20/24  Vaccinations: UTD  Other: Needs complete med rec at next office visit       Follow up in our clinic in 6 month(s) for  Medicare Wellness Exam with any provider .    Subjective   HISTORY OF PRESENT ILLNESS:  Samantha Rodriguez is a 58 y.o. female with a past medical history of anxiety with panic attacks, bilateral knee osteoarthritis s/p bilateral knee replacements, chronic pain syndrome, opioid dependence, tardive dyskinesia, hypertension, hyperlipidemia, and prior CVA who presents to clinic today for GI complaints.    Today, patient reports chronic GI complaints including abdominal cramping/bloating and change in bowel habits.  She scheduled this appointment because she was confused about instructions after her last office visit with GI on 2/20/2024.  I reviewed the recommendations from last GI office visit with patient.  She reports that she has been having liquid bowel movements for the past few days since completing her GoLytely bowel cleanse.  She is scheduled for EGD and colonoscopy on 3/20/2024.  She reports that she is usually constipated, not having a bowel movement for weeks at a time but is now moving her bowels daily after the bowel prep.  Explained and reassured  "that her current bowel movement consistency is expected given completion of the bowel prep.  She complains of cramping/bloating sensation that is been ongoing for years.  She reports that her reflux symptoms are improved at this time.  She denies other GI symptoms including dysphagia, nausea, vomiting, melena, hematochezia.    Briefly reviewed social situation with patient.  She states that she briefly was living in an assisted living facility [facility] and had switched providers, however she was displeased with the care received there so she is now transitioning back to care here in our office and living back at home with her family.  Attempted once again to review medications with patient, however she does not have her medications with her today and is on clear of the current medications and doses that she is taking.  I attempted to review the general GI medications that were recommended after her last office visit and she verbalized understanding at this time.    Patient also requesting back brace to be re-ordered d/t previous incorrect diagnosis code. Per chart review, back brace was ordered at last office visit (2/19/24) with new diagnosis code. Number provided to patient at office today to call company to acquire.     Review of Systems - Negative other than stated above    Objective     Vitals:    03/04/24 1039   BP: 135/84   BP Location: Left arm   Patient Position: Sitting   Cuff Size: Adult   Pulse: 75   Temp: 98.1 °F (36.7 °C)   TempSrc: Temporal   Weight: 83.9 kg (185 lb)   Height: 5' 1\" (1.549 m)       Physical Exam:  General: Obese. No apparent distress, resting comfortably   Head: Normocephalic, atraumatic  Eyes: Anicteric, no conjunctival erythema  ENT: External ear normal, no nasal discharge  Neck: Trachea midline, no visible lymphadenopathy or goiter  Respiratory:  Non-labored respirations, symmetric thorax expansion  Cardiovascular:  Extremities appear well-perfused  Abdomen: " Non-distended  Extremities: Moves extremities spontaneously, no peripheral edema  Skin: No visible rashes, wounds, or jaundice  Neuro: A&O x 3, no gross focal deficits, no aphasia    History     Current Outpatient Medications:     acetaminophen (TYLENOL) 500 mg tablet, Take 1 tablet (500 mg total) by mouth every 8 (eight) hours as needed for mild pain, Disp: 180 tablet, Rfl: 1    aspirin (Aspirin Low Dose) 81 mg EC tablet, Take 1 tablet (81 mg total) by mouth daily, Disp: 30 tablet, Rfl: 0    atorvastatin (LIPITOR) 40 mg tablet, Take 1 tablet (40 mg total) by mouth daily, Disp: 30 tablet, Rfl: 0    busPIRone (BUSPAR) 15 mg tablet, , Disp: , Rfl:     cholecalciferol (VITAMIN D3) 1,000 units tablet, Take 1 tablet (1,000 Units total) by mouth daily Do not start before December 10, 2023., Disp: 30 tablet, Rfl: 0    clonazePAM (KlonoPIN) 1 mg tablet, Take 1 tablet (1 mg total) by mouth 2 (two) times a day for 10 days, Disp: 20 tablet, Rfl: 0    Diclofenac Sodium (VOLTAREN) 1 %, Apply 2 g topically 4 (four) times a day as needed (pain), Disp: 150 g, Rfl: 0    famotidine (PEPCID) 20 mg tablet, Take 2 tablets (40 mg total) by mouth daily at bedtime, Disp: 60 tablet, Rfl: 3    hydrOXYzine pamoate (VISTARIL) 50 mg capsule, Take 50 mg by mouth 3 (three) times a day, Disp: , Rfl:     Ingrezza 40 MG CAPS, Take 40 mg by mouth in the morning, Disp: 30 capsule, Rfl: 5    linaCLOtide (Linzess) 290 MCG CAPS, Take 1 capsule by mouth daily before breakfast, Disp: 30 capsule, Rfl: 3    melatonin 3 mg, Take 2 tablets (6 mg total) by mouth daily at bedtime, Disp: 60 tablet, Rfl: 0    mirtazapine (REMERON) 15 mg tablet, TAKE 1/2 TAB (7.5MG) AT BEDTIME, Disp: , Rfl:     pantoprazole (PROTONIX) 40 mg tablet, Take 1 tablet (40 mg total) by mouth 2 (two) times a day before meals, Disp: 60 tablet, Rfl: 2    polyethylene glycol (GLYCOLAX) 17 GM/SCOOP powder, Take 17 g by mouth daily, Disp: 510 g, Rfl: 2    polyethylene glycol (GOLYTELY) 4000 mL  solution, Take as instructed on bowel preparation instructions, Disp: 4000 mL, Rfl: 0    polyethylene glycol (GOLYTELY) 4000 mL solution, Take as instructed on bowel preparation instruction, Disp: 4000 mL, Rfl: 0    prazosin (MINIPRESS) 5 mg capsule, , Disp: , Rfl:     pregabalin (LYRICA) 100 mg capsule, Take 1 capsule (100 mg total) by mouth 3 (three) times a day for 10 days, Disp: 30 capsule, Rfl: 0    QUEtiapine (SEROquel XR) 150 mg 24 hr tablet, , Disp: , Rfl:     QUEtiapine (SEROquel XR) 50 mg, Take 50 mg by mouth daily at bedtime, Disp: , Rfl:     sertraline (ZOLOFT) 100 mg tablet, , Disp: , Rfl:     topiramate (TOPAMAX) 100 mg tablet, , Disp: , Rfl:     traMADol (ULTRAM) 50 mg tablet, , Disp: , Rfl:     Current Facility-Administered Medications:     aluminum-magnesium hydroxide-simethicone (MAALOX) oral suspension 30 mL, 30 mL, Oral, Q4H PRN,   No Known Allergies   Past Medical History:   Diagnosis Date    Anxiety     Arthritis     left knee    Arthritis of right knee 10/6/2020    At risk for falls     Bipolar 2 disorder (HCC)     FOLLOWS WITH PSYCHIATRIST. CONTINUE LAMOTRIGINE; RESOLVED: 28JAN2016    Depression     Familial tremor     both hands    Fibromyalgia     LAST ASSESSED: 08DEC2017    GERD (gastroesophageal reflux disease)     Hearing aid worn     left ear    Sycuan (hard of hearing)     left ear    Hyperlipidemia     Hypertension     Left-sided weakness     Lumbar disc disease with radiculopathy 2/2/2018    Memory loss of unknown cause     long and short term    Migraine     Multiple closed fractures of metatarsal bone of right foot 5/2/2021    Obesity     Obesity, Class II, BMI 35-39.9     Osteoarthritis of both hips 10/31/2016    Osteoarthritis of knee 2/20/2013    Description: Continue Tylenol and Naproxen. Encourage exercise and weight loss. Patient refused physical therapy. I will refer the patient back to Orthopedics.    Overactive bladder     Panic attack     Patellofemoral disorder of both  knees 2020    Post traumatic stress disorder     Primary localized osteoarthritis of both knees 2017    Primary osteoarthritis of both knees 2020    S/P insertion of spinal cord stimulator     no remote    S/P total knee arthroplasty, right 3/10/2022    Sacroiliitis (HCC) 2017    Seasonal allergies     Seizure-like activity (HCC) 6/3/2022    Seizures (HCC)     possible seizure like activity    Small bowel obstruction (HCC) 3/24/2023    Status post total knee replacement, left 2022    Stroke (HCC)     questionable stroke     Tardive dyskinesia     PATIENT STATES    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  CONT ASA 81 MG DAILY; RESOLVED: 2015    Urinary incontinence     Uses walker     Wears dentures     partial lower / full upper- doesnt wear    Wears glasses      Past Surgical History:   Procedure Laterality Date    BACK SURGERY       SECTION      COLONOSCOPY      RESOLVED: 2016    EAR SURGERY      EGD      HYSTERECTOMY      MYRINGOTOMY W/ TUBES Left     NECK SURGERY  2019    OH ARTHRP KNE CONDYLE&PLATU MEDIAL&LAT COMPARTMENTS Right 3/9/2022    Procedure: ARTHROPLASTY KNEE TOTAL;  Surgeon: Chandni Tuttle DO;  Location: [facility];  Service: Orthopedics    OH ARTHRP KNE CONDYLE&PLATU MEDIAL&LAT COMPARTMENTS Left 2022    Procedure: ARTHROPLASTY KNEE TOTAL;  Surgeon: Chandni Tuttle DO;  Location: [facility];  Service: Orthopedics    OH CYSTOURETHROSCOPY N/A 2016    Procedure: CYSTOSCOPY, BOTOX INJECTION;  Surgeon: Abraham Teague MD;  Location: [facility];  Service: Gynecology    OH INSJ/RPLCMT SPINAL NPG/RCVR POCKET CRTJ&CONNJ Right 2/10/2021    Procedure: REPLACEMENT IMPLANTABLE PULSE GENERATOR DORSAL SPINAL COLUMN STIMULATOR, RIGHT;  Surgeon: Carlos Alberto Jacome MD;  Location: [facility];  Service: Neurosurgery    OH PRQ IMPLTJ NSTIM ELECTRODE ARRAY EPIDURAL Right 2020    Procedure: INSERTION THORACIC DORSAL  COLUMN SPINAL CORD STIMULATOR PERCUTANEOUS W IMPLANTABLE PULSE GENERATOR, RIGHT;  Surgeon: Carlos Alberto Jacome MD;  Location:  MAIN OR;  Service: Neurosurgery    TONSILLECTOMY      TUBAL LIGATION  1986    UPPER GASTROINTESTINAL ENDOSCOPY  09/2020     Social History     Socioeconomic History    Marital status:      Spouse name: Not on file    Number of children: 2    Years of education: graduate school     Highest education level: Not on file   Occupational History    Occupation: Disabled   Tobacco Use    Smoking status: Never    Smokeless tobacco: Never   Vaping Use    Vaping status: Never Used   Substance and Sexual Activity    Alcohol use: Not Currently     Comment: 2 x year; being a social drinker as per all scripts     Drug use: No    Sexual activity: Not Currently   Other Topics Concern    Not on file   Social History Narrative    Bereavement    Daily caffeine consumption, 6-8 servings per day     as per all scripts    Lives in [place]      Social Determinants of Health     Financial Resource Strain: Low Risk  (2/19/2024)    Overall Financial Resource Strain (CARDIA)     Difficulty of Paying Living Expenses: Not hard at all   Food Insecurity: No Food Insecurity (2/19/2024)    Hunger Vital Sign     Worried About Running Out of Food in the Last Year: Never true     Ran Out of Food in the Last Year: Never true   Transportation Needs: No Transportation Needs (2/19/2024)    PRAPARE - Transportation     Lack of Transportation (Medical): No     Lack of Transportation (Non-Medical): No   Physical Activity: Insufficiently Active (2/19/2024)    Exercise Vital Sign     Days of Exercise per Week: 5 days     Minutes of Exercise per Session: 20 min   Stress: No Stress Concern Present (2/19/2024)    [place] [employer] - Occupational Stress Questionnaire     Feeling of Stress : Not at all   Social Connections: Socially Isolated (2/19/2024)    Social Connection and Isolation Panel [NHANES]      Frequency of Communication with Friends and Family: More than three times a week     Frequency of Social Gatherings with Friends and Family: More than three times a week     Attends Faith Services: Never     Active Member of Clubs or Organizations: No     Attends Club or Organization Meetings: Never     Marital Status:    Intimate Partner Violence: Not At Risk (2/19/2024)    Humiliation, Afraid, Rape, and Kick questionnaire     Fear of Current or Ex-Partner: No     Emotionally Abused: No     Physically Abused: No     Sexually Abused: No   Housing Stability: Low Risk  (2/19/2024)    Housing Stability Vital Sign     Unable to Pay for Housing in the Last Year: No     Number of Places Lived in the Last Year: 2     Unstable Housing in the Last Year: No     Family History   Problem Relation Age of Onset    Colon cancer Mother     Alzheimer's disease Father     Stroke Father     No Known Problems Sister     Colon cancer Brother     Bipolar disorder Brother     No Known Problems Brother     Depression Paternal Grandfather     Breast cancer Maternal Aunt     Colon cancer Maternal Aunt     Seizures Son     Heart disease Other     Diabetes Other     Hypertension Other     Thyroid disease Neg Hx        Glo Valente DO  [facility] Internal Medicine PGY-3  [place] [address]  [address]  [place] [address]    PLEASE NOTE:  This encounter was completed utilizing the [employer] Direct Speech Voice Recognition Software. Grammatical errors, random word insertions, pronoun errors and incomplete sentences are occasional consequences of the system due to software limitations, ambient noise and hardware issues.These may be missed by proof reading prior to affixing electronic signature. Any questions or concerns about the content, text or information contained within the body of this dictation should be directly addressed to the physician for clarification. Please do not hesitate to  call me directly if you have any any questions or concerns.

## 2024-03-04 NOTE — TELEPHONE ENCOUNTER
Please print and fax notes from appointment on 2/19/2024. The script for the LSO is the lumbar back brace in other orders.

## 2024-03-04 NOTE — PROGRESS NOTES
SW has met with pt this date at her PCP appointment.  Pt has re-established with our Office .  She share she has left [facility] and is currently living wither son @ [address] [place] [address].  The palsm is fpr pt to move in withehr dgt Cyrie in a few weeks in their stand alone one story home. Located is [facility].  Sw has asked if she is back on the [place]aiver Program or if she wants to re-apply?  Pt only wants dgt to be her care giver and dgt does work , cares for her children and welcomes another caregiver.  Pt shares she plans to be with her dgt as she works at her dgt's [employer].  Pt /dgt  declining the Waiver at present as she does not think she needs it.  Sw did share if pt changes her mind she can call the [facility] [phone]  to ask for services to resume. Moisés advised Aging probably would need to do a new assessment.    Moisés has asked about her OP MH f/u and she shares she would like to contine with her new Provider - [facility]  Alex MADRID  [address] [place] [address] [phone]  fax [phone],  Pt asked for SW to send her insurance info to their Office,  Sw attempted to do same and spoke to Office who shares they do NOT take pt's Insurance.  They has ordered her medication 1/29/24 and they will run out in APRIL 2024.    MOISÉS to reach out to pt/dgt re: same.

## 2024-03-04 NOTE — PROGRESS NOTES
Outpatient Care Management Note:    Re:  referral     Patient at [facility] today and was previously enrolled in complex care management. Patient transitioning back to [facility] for primary care and was living at an [facility] in [place] which is an assisted living facility for a brief period of time but displeased with the care and food there and is back living with family. I spoke with Dr. Valente prior to patient's appointment making him aware I planned to further follow up with patient today regarding medication management, need for any specialty follow up appointments and to address and further medical needs. Encouraged social work referral for any social needs. Patient declined outreach from nurse care manager today and told social patricia Gerardo she was managing ok with her medication at this time. However, patient did not bring any medication with her to appointment today.     Please see physician note and  note for more information.     Please re-consult as needed.

## 2024-03-05 ENCOUNTER — TELEPHONE (OUTPATIENT)
Dept: INTERNAL MEDICINE CLINIC | Facility: CLINIC | Age: 59
End: 2024-03-05

## 2024-03-05 ENCOUNTER — PATIENT OUTREACH (OUTPATIENT)
Dept: INTERNAL MEDICINE CLINIC | Facility: CLINIC | Age: 59
End: 2024-03-05

## 2024-03-05 NOTE — TELEPHONE ENCOUNTER
----- Message from Glo Valente DO sent at 3/4/2024  7:04 PM EST -----  Regarding: HTN f/u  Hello.  I saw the patient for office visit today 3/4/24, but was unable to address her hypertension due to time constraints.  Please call the patient and inform her that I plan to restart her amlodipine 5 mg daily and I have sent this to her pharmacy.  If she has a way to check her blood pressure at home and call with the readings in 1 week that would be great.  Otherwise, please inform her to  the amlodipine and return to the office in 1 week for a nursing visit check for BP.  I will adjust her blood pressure meds accordingly.     Thanks,   Dr. Valente

## 2024-03-05 NOTE — PROGRESS NOTES
SW attempted to reach pt to inform her that her new Provider - [facility]  [facility]  @ [address] [place] [address]  [phone]   fax [phone] does NOT TAKE her Insurance and she needs to find a new PROVIDER.  SW left message re same and asked pt to return SW call.   SW did reach out to pt's dgt Tunde and did relay the message re the [facility] NOT talking pt's insurance.    Sw has advised reaching out to her former  Provider at [facility] and has provided her the contact info for [facility] [phone] or direct [phone] to reach out to request pt be allowed to restart.  HIRAM aslo reviewed she should help her Mother get her new Insurance card .  Dgt shares she has received mail from [employer] at her Office.    Sw advised seeing if she has the card and if not to call and request one.    Hiram also suggested calling the Customer Service # for alteratives if [facility] can not take her back.   Sw did share the ID #'s are the same with her new [employer] plans as it ws with [employer].  SW shares her medications will run our in April 2024.  If she has a problem with re-establishing and need medication she should f/u with her PCP.     Dgt denies need of other needs at present.    Patient does not have any further questions, concerns, or other needs at this time.  Patient has my contact # and PCP office # if needed.  Social Work to remain available to assist as indicated.    Please re-consult Social Work if needed.      SW received a message from pt who shares that she has spoken to the Social Security Office and because of the brief time she was at [facility] they had become pt's Rep Payee. She is now asking her PCP to complete the paperwork to have that Rep payee rescinded.  She only had it because she was in there facility.  She is fearful she will not get her money  back or in a timely fashion.  Prior she also ways managed her affairs.  HIRAM informed pt SW will let the Providers and clerical Team know .    Sw also strongly encouraged pt to call  Hospital back to get an appointments the re ASAP as getting Medicare and Medicare Advantage appointment are very difficult to get and the waiting lists are long.

## 2024-03-06 ENCOUNTER — PATIENT OUTREACH (OUTPATIENT)
Dept: INTERNAL MEDICINE CLINIC | Facility: CLINIC | Age: 59
End: 2024-03-06

## 2024-03-06 NOTE — PROGRESS NOTES
SW received call from pt who requested OFFICE FAX # ( [phone]) and # provided.  She is having the Social security Office fax a form so pt can have her Rep Payee removed since she is no longer in a personal care facility.

## 2024-03-07 ENCOUNTER — TELEPHONE (OUTPATIENT)
Dept: INTERNAL MEDICINE CLINIC | Facility: CLINIC | Age: 59
End: 2024-03-07

## 2024-03-07 NOTE — TELEPHONE ENCOUNTER
Received call from patient requesting update if facility received fax from social security office.     Attempted to reach patient at both , daughter number. Unable to leave voice message.     Left voice message at .

## 2024-03-07 NOTE — TELEPHONE ENCOUNTER
Folder Color- Red    Name of Form- Medical Source Opinion     Form to be filled out by- Dr. Valente    Form to be given to HIRAM Gerardo    Patient made aware of 10 business day policy.

## 2024-03-08 NOTE — TELEPHONE ENCOUNTER
Printed out 2/19/24 chart notes, script for back brace, faxed to [facility] [phone], received transaction report.

## 2024-03-12 ENCOUNTER — TELEPHONE (OUTPATIENT)
Dept: NEUROSURGERY | Facility: CLINIC | Age: 59
End: 2024-03-12

## 2024-03-12 NOTE — TELEPHONE ENCOUNTER
RECEIVED CALL FROM ZAIDA AT [facility] REQUESTING THAT A 2ND PAGE TO A FORM SHE FAXED BE FAXED ALSO.    AFTER REVIEWING CHART . NO 2ND FORM IS THERE ONLY PAGE ONE OF ONE...    SHE WILL REFAX .

## 2024-03-14 ENCOUNTER — TELEPHONE (OUTPATIENT)
Dept: INTERNAL MEDICINE CLINIC | Facility: CLINIC | Age: 59
End: 2024-03-14

## 2024-03-14 ENCOUNTER — ANESTHESIA EVENT (OUTPATIENT)
Dept: ANESTHESIOLOGY | Facility: HOSPITAL | Age: 59
End: 2024-03-14

## 2024-03-14 ENCOUNTER — ANESTHESIA (OUTPATIENT)
Dept: ANESTHESIOLOGY | Facility: HOSPITAL | Age: 59
End: 2024-03-14

## 2024-03-14 DIAGNOSIS — R14.0 BLOATING: Primary | ICD-10-CM

## 2024-03-14 DIAGNOSIS — M79.7 FIBROMYALGIA: ICD-10-CM

## 2024-03-14 RX ORDER — PREGABALIN 100 MG/1
100 CAPSULE ORAL 3 TIMES DAILY
Qty: 30 CAPSULE | Refills: 0 | OUTPATIENT
Start: 2024-03-14 | End: 2024-03-24

## 2024-03-14 RX ORDER — TRAMADOL HYDROCHLORIDE 50 MG/1
TABLET ORAL
OUTPATIENT
Start: 2024-03-14

## 2024-03-14 NOTE — TELEPHONE ENCOUNTER
Spoke with patient on the phone. Patient confirmed she is scheduled tomorrow 3/15/24 to be fit for her back brace.

## 2024-03-14 NOTE — TELEPHONE ENCOUNTER
Patient call in requesting status on her back brace, I did advise patient that the information was faxed to [facility] on 3/8, patient requested [facility] ph# which I did provided her with it [phone] and she advise she would call to get an update.

## 2024-03-14 NOTE — TELEPHONE ENCOUNTER
Spoke with patient on the phone. Patient stated the pharmacy did not receive script for Maalox. Discussed with patient that Maalox can be purchased over the counter but will follow up with physician.     Please review script. Pharmacy did not receive.

## 2024-03-14 NOTE — TELEPHONE ENCOUNTER
Patient stated that she never received or the below was never sent, please advise      aluminum-magnesium hydroxide-simethicone (MAALOX) oral suspension 30 mL

## 2024-03-14 NOTE — TELEPHONE ENCOUNTER
Received Voicemail - Hi, my name is Samantha Rodriguez. My birthday is 1965. I'm calling because I'm returning the call concerning some medicine. I'm supposed to get some medicine before. Can't come blow up my abdomen lump, supposed to have a cold month free on the 20th. I hadn't received that medicine. I also supposed to receive back brace. I'm having problems standing straight up and I have been able to stand straight up or walk with her Walker since 2020 have problem and also I need a refill for Georgette and also Tramadol. I'm having pain in my buttock and size and types of my legs and my brother back. Video call in tramadol. I need refills. My phone number is [phone]. Could you call me back by the way? Bye, bye.      Returned call and left voicemail kime patient to return our call

## 2024-03-15 NOTE — TELEPHONE ENCOUNTER
Maalox sent to pt's pharmacy. Just a reminder, I am in the ICU this month so should not be taskable.

## 2024-03-15 NOTE — TELEPHONE ENCOUNTER
Patient calling with prep questions and stated she will be taking an uber to and from procedure. Advised she will need someone to bring and pick her up from the procedure.

## 2024-03-18 ENCOUNTER — TELEPHONE (OUTPATIENT)
Dept: GASTROENTEROLOGY | Facility: CLINIC | Age: 59
End: 2024-03-18

## 2024-03-18 ENCOUNTER — TELEPHONE (OUTPATIENT)
Dept: GASTROENTEROLOGY | Facility: HOSPITAL | Age: 59
End: 2024-03-18

## 2024-03-18 ENCOUNTER — NURSE TRIAGE (OUTPATIENT)
Age: 59
End: 2024-03-18

## 2024-03-18 ENCOUNTER — TELEPHONE (OUTPATIENT)
Age: 59
End: 2024-03-18

## 2024-03-18 NOTE — TELEPHONE ENCOUNTER
Patients GI provider:  Dr. Jaiyeola    Number to return call: [phone]    Reason for call: Pt calling with questions regarding 2 day bowel prep. Reviewed with pt but she is confused about miralax dosing. Transferred call to Shari in triage.     Scheduled procedure/appointment date if applicable: procedure 3/20

## 2024-03-18 NOTE — TELEPHONE ENCOUNTER
Pt called in going over on her bowel prep instructions and all of a sudden loud music was on she couldn't hear me and so was disconnected.

## 2024-03-18 NOTE — TELEPHONE ENCOUNTER
Pt has 2 day prep, stated she will not have prep until tomorrow and also does not have a ride home from procedure, advised she would need to reschedule.

## 2024-03-18 NOTE — TELEPHONE ENCOUNTER
Patient request her prep instructions to be emailed. Golytely instructions were sent to [email or web address]

## 2024-03-18 NOTE — TELEPHONE ENCOUNTER
"Reason for Disposition   Information only question and nurse able to answer    Answer Assessment - Initial Assessment Questions  1. REASON FOR CALL or QUESTION: \"What is your reason for calling today?\" or \"How can I best help you?\" or \"What question do you have that I can help answer?\"          Pt. Called and needed to review prep instructions, reviewed 2 day prep instruction with pt. In length, pt. Returned verbal instruction, no further review needed, pt. Did not have Gatorade but would mix Miralax with water to drink tonight at 6 pm    Protocols used: Information Only Call - No Triage-ADULT-[place]    "

## 2024-03-20 ENCOUNTER — ANESTHESIA (OUTPATIENT)
Dept: GASTROENTEROLOGY | Facility: HOSPITAL | Age: 59
End: 2024-03-20

## 2024-03-20 ENCOUNTER — ANESTHESIA EVENT (OUTPATIENT)
Dept: GASTROENTEROLOGY | Facility: HOSPITAL | Age: 59
End: 2024-03-20

## 2024-03-20 ENCOUNTER — NURSE TRIAGE (OUTPATIENT)
Age: 59
End: 2024-03-20

## 2024-03-20 ENCOUNTER — HOSPITAL ENCOUNTER (OUTPATIENT)
Dept: GASTROENTEROLOGY | Facility: HOSPITAL | Age: 59
Setting detail: OUTPATIENT SURGERY
Discharge: HOME | End: 2024-03-20
Attending: INTERNAL MEDICINE
Payer: MEDICARE

## 2024-03-20 VITALS
TEMPERATURE: 97.6 F | RESPIRATION RATE: 18 BRPM | HEART RATE: 62 BPM | SYSTOLIC BLOOD PRESSURE: 110 MMHG | OXYGEN SATURATION: 100 % | DIASTOLIC BLOOD PRESSURE: 87 MMHG

## 2024-03-20 DIAGNOSIS — K21.9 GERD WITHOUT ESOPHAGITIS: ICD-10-CM

## 2024-03-20 DIAGNOSIS — Z80.0 FAMILY HISTORY OF COLON CANCER: ICD-10-CM

## 2024-03-20 DIAGNOSIS — K59.09 OTHER CONSTIPATION: ICD-10-CM

## 2024-03-20 DIAGNOSIS — R10.84 GENERALIZED ABDOMINAL PAIN: ICD-10-CM

## 2024-03-20 PROCEDURE — 88305 TISSUE EXAM BY PATHOLOGIST: CPT | Performed by: PATHOLOGY

## 2024-03-20 PROCEDURE — 43239 EGD BIOPSY SINGLE/MULTIPLE: CPT | Performed by: INTERNAL MEDICINE

## 2024-03-20 PROCEDURE — 45385 COLONOSCOPY W/LESION REMOVAL: CPT | Performed by: INTERNAL MEDICINE

## 2024-03-20 RX ORDER — SODIUM CHLORIDE, SODIUM LACTATE, POTASSIUM CHLORIDE, CALCIUM CHLORIDE 600; 310; 30; 20 MG/100ML; MG/100ML; MG/100ML; MG/100ML
50 INJECTION, SOLUTION INTRAVENOUS CONTINUOUS
Status: DISCONTINUED | OUTPATIENT
Start: 2024-03-20 | End: 2024-03-24 | Stop reason: HOSPADM

## 2024-03-20 RX ORDER — LIDOCAINE HYDROCHLORIDE 20 MG/ML
INJECTION, SOLUTION EPIDURAL; INFILTRATION; INTRACAUDAL; PERINEURAL AS NEEDED
Status: DISCONTINUED | OUTPATIENT
Start: 2024-03-20 | End: 2024-03-20

## 2024-03-20 RX ORDER — PROPOFOL 10 MG/ML
INJECTION, EMULSION INTRAVENOUS AS NEEDED
Status: DISCONTINUED | OUTPATIENT
Start: 2024-03-20 | End: 2024-03-20

## 2024-03-20 RX ORDER — SODIUM CHLORIDE, SODIUM LACTATE, POTASSIUM CHLORIDE, CALCIUM CHLORIDE 600; 310; 30; 20 MG/100ML; MG/100ML; MG/100ML; MG/100ML
50 INJECTION, SOLUTION INTRAVENOUS CONTINUOUS
Status: CANCELLED | OUTPATIENT
Start: 2024-03-20

## 2024-03-20 RX ORDER — SODIUM CHLORIDE, SODIUM LACTATE, POTASSIUM CHLORIDE, CALCIUM CHLORIDE 600; 310; 30; 20 MG/100ML; MG/100ML; MG/100ML; MG/100ML
INJECTION, SOLUTION INTRAVENOUS CONTINUOUS PRN
Status: DISCONTINUED | OUTPATIENT
Start: 2024-03-20 | End: 2024-03-20

## 2024-03-20 RX ADMIN — PROPOFOL 50 MG: 10 INJECTION, EMULSION INTRAVENOUS at 09:53

## 2024-03-20 RX ADMIN — PROPOFOL 50 MG: 10 INJECTION, EMULSION INTRAVENOUS at 10:11

## 2024-03-20 RX ADMIN — PROPOFOL 50 MG: 10 INJECTION, EMULSION INTRAVENOUS at 10:05

## 2024-03-20 RX ADMIN — SODIUM CHLORIDE, SODIUM LACTATE, POTASSIUM CHLORIDE, AND CALCIUM CHLORIDE: .6; .31; .03; .02 INJECTION, SOLUTION INTRAVENOUS at 09:01

## 2024-03-20 RX ADMIN — LIDOCAINE HYDROCHLORIDE 100 MG: 20 INJECTION, SOLUTION EPIDURAL; INFILTRATION; INTRACAUDAL at 09:49

## 2024-03-20 RX ADMIN — PROPOFOL 50 MG: 10 INJECTION, EMULSION INTRAVENOUS at 09:57

## 2024-03-20 RX ADMIN — PROPOFOL 100 MG: 10 INJECTION, EMULSION INTRAVENOUS at 09:49

## 2024-03-20 RX ADMIN — SODIUM CHLORIDE, SODIUM LACTATE, POTASSIUM CHLORIDE, AND CALCIUM CHLORIDE 50 ML/HR: .6; .31; .03; .02 INJECTION, SOLUTION INTRAVENOUS at 09:43

## 2024-03-20 NOTE — TELEPHONE ENCOUNTER
"Pt calling in, she had a procedure done at [place] today and was asking if a [employer] phone was found. I called the endoscopy center but they did not find any phone.   If phone is found contact pt at [phone] Tunde pts daughter.         Reason for Disposition   Information only question and nurse able to answer    Answer Assessment - Initial Assessment Questions  1. REASON FOR CALL or QUESTION: \"What is your reason for calling today?\" or \"How can I best help you?\" or \"What question do you have that I can help answer?\"      Lost phone    Protocols used: Information Only Call - No Triage-ADULT-[place]    "

## 2024-03-20 NOTE — ANESTHESIA PREPROCEDURE EVALUATION
Procedure:  COLONOSCOPY  EGD    Relevant Problems   CARDIO   (+) Essential hypertension   (+) Migraine   (+) Mixed hyperlipidemia      GI/HEPATIC   (+) Dysphagia   (+) GERD without esophagitis      MUSCULOSKELETAL   (+) Chronic low back pain, unspecified back pain laterality, unspecified whether sciatica present   (+) Fibromyalgia   (+) Lumbar spondylosis      NEURO/PSYCH   (+) CVA (cerebral vascular accident) (HCC)   (+) Chronic low back pain, unspecified back pain laterality, unspecified whether sciatica present   (+) Chronic pain disorder   (+) Fibromyalgia   (+) Migraine   (+) Panic disorder without agoraphobia      PULMONARY   (+) MIMI (obstructive sleep apnea)        Physical Exam    Airway  Comment: H/o neck fusion   Mallampati score: II  TM Distance: >3 FB  Neck ROM: full     Dental        Cardiovascular  Cardiovascular exam normal    Pulmonary  Pulmonary exam normal     Other Findings  post-pubertal.      Anesthesia Plan  ASA Score- 3     Anesthesia Type- IV sedation with anesthesia with ASA Monitors.         Additional Monitors:     Airway Plan:            Plan Factors-    Chart reviewed. EKG reviewed.  Existing labs reviewed. Patient summary reviewed.                  Induction-     Postoperative Plan-     Informed Consent- Anesthetic plan and risks discussed with patient.  I personally reviewed this patient with the CRNA. Discussed and agreed on the Anesthesia Plan with the CRNA..              Lab Results   Component Value Date    HGBA1C 5.2 02/20/2024       Lab Results   Component Value Date     07/27/2015    K 3.5 02/20/2024     (H) 02/20/2024    CO2 24 02/20/2024    ANIONGAP 7 07/27/2015    BUN 8 02/20/2024    CREATININE 0.85 02/20/2024    GLUCOSE 96 07/27/2015    GLUF 86 10/19/2023    CALCIUM 8.8 02/20/2024    CORRECTEDCA 8.6 03/29/2023    AST 30 02/20/2024    ALT 53 (H) 02/20/2024    ALKPHOS 102 02/20/2024    PROT 6.9 02/22/2014    BILITOT 0.51 02/22/2014    EGFR 74 02/20/2024       Lab  Results   Component Value Date    WBC 2.80 (L) 11/17/2023    HGB 13.5 11/17/2023    HCT 41.4 11/17/2023    MCV 91 11/17/2023     11/17/2023     Normal sinus rhythm  Nonspecific T wave abnormality  Abnormal ECG  When compared with ECG of 14-SEP-2023 01:18,  Nonspecific T wave abnormality, worse in Anterior leads  Confirmed by Edmar Coy ([id number]) on 10/18/2023 8:00:02 AM      Specimen Collected: 10/18/23 02:20        Stroke. MIMI. CVA. HLD.   Echo 2023 July     Left Ventricle: Left ventricular cavity size is normal. Wall thickness is normal. The left ventricular ejection fraction is 60%. Systolic function is normal. Wall motion is normal. Diastolic function is normal.    Aortic Valve: There is trace regurgitation.    Mitral Valve: There is trace regurgitation.    Tricuspid Valve: There is trace regurgitation.    Pulmonic Valve: There is trace regurgitation.

## 2024-03-20 NOTE — ANESTHESIA POSTPROCEDURE EVALUATION
Post-Op Assessment Note    CV Status:  Stable  Pain Score: 0    Pain management: adequate       Mental Status:  Alert   Hydration Status:  Stable   PONV Controlled:  Controlled   Airway Patency:  Patent     Post Op Vitals Reviewed: Yes    No anethesia notable event occurred.    Staff: CRNA               BP   145/98   Temp      Pulse  64   Resp   20   SpO2   99

## 2024-03-20 NOTE — H&P
History and Physical - [facility]  Samantha Rodriguez 58 y.o. female MRN: [id number]                  HPI: Samantha Rodriguez is a 58 y.o. year old female who presents for EGD for GERD with uncontrolled symptoms despite PPI and colonoscopy due to prior polyps and family history of colon cancer.         REVIEW OF SYSTEMS: Per the HPI, and otherwise unremarkable.    Historical Information   Past Medical History:   Diagnosis Date    Anxiety     Arthritis     left knee    Arthritis of right knee 10/6/2020    At risk for falls     Bipolar 2 disorder (HCC)     FOLLOWS WITH PSYCHIATRIST. CONTINUE LAMOTRIGINE; RESOLVED: 28JAN2016    Depression     Familial tremor     both hands    Fibromyalgia     LAST ASSESSED: 08DEC2017    GERD (gastroesophageal reflux disease)     Hearing aid worn     left ear    [place] (hard of hearing)     left ear    Hyperlipidemia     Hypertension     Left-sided weakness     Lumbar disc disease with radiculopathy 2/2/2018    Memory loss of unknown cause     long and short term    Migraine     Multiple closed fractures of metatarsal bone of right foot 5/2/2021    Obesity     Obesity, Class II, BMI 35-39.9     Osteoarthritis of both hips 10/31/2016    Osteoarthritis of knee 2/20/2013    Description: Continue Tylenol and Naproxen. Encourage exercise and weight loss. Patient refused physical therapy. I will refer the patient back to Orthopedics.    Overactive bladder     Panic attack     Patellofemoral disorder of both knees 5/1/2020    Post traumatic stress disorder     Primary localized osteoarthritis of both knees 6/16/2017    Primary osteoarthritis of both knees 5/1/2020    S/P insertion of spinal cord stimulator     no remote    S/P total knee arthroplasty, right 3/10/2022    Sacroiliitis (HCC) 2/28/2017    Seasonal allergies     Seizure-like activity (HCC) 6/3/2022    Seizures (HCC)     possible seizure like activity    Small bowel obstruction (HCC) 3/24/2023    Status post total knee  replacement, left 2022    Stroke (HCC)     questionable stroke     Tardive dyskinesia     PATIENT STATES    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  CONT ASA 81 MG DAILY; RESOLVED: 2015    Urinary incontinence     Uses walker     Wears dentures     partial lower / full upper- doesnt wear    Wears glasses      Past Surgical History:   Procedure Laterality Date    BACK SURGERY       SECTION      COLONOSCOPY      RESOLVED: 2016    EAR SURGERY      EGD      HYSTERECTOMY  2004    MYRINGOTOMY W/ TUBES Left     NECK SURGERY  2019    GA ARTHRP KNE CONDYLE&PLATU MEDIAL&LAT COMPARTMENTS Right 3/9/2022    Procedure: ARTHROPLASTY KNEE TOTAL;  Surgeon: Chandni Tuttle DO;  Location: [facility];  Service: Orthopedics    GA ARTHRP KNE CONDYLE&PLATU MEDIAL&LAT COMPARTMENTS Left 2022    Procedure: ARTHROPLASTY KNEE TOTAL;  Surgeon: Chandni Tuttle DO;  Location[facility];  Service: Orthopedics    GA CYSTOURETHROSCOPY N/A 2016    Procedure: CYSTOSCOPY, BOTOX INJECTION;  Surgeon: Abraham Teague MD;  Location: [facility];  Service: Gynecology    GA INSJ/RPLCMT SPINAL NPG/RCVR POCKET CRTJ&CONNJ Right 2/10/2021    Procedure: REPLACEMENT IMPLANTABLE PULSE GENERATOR DORSAL SPINAL COLUMN STIMULATOR, RIGHT;  Surgeon: Carlos Alberto Jacome MD;  Location: [facility];  Service: Neurosurgery    GA PRQ IMPLTJ NSTIM ELECTRODE ARRAY EPIDURAL Right 2020    Procedure: INSERTION THORACIC DORSAL COLUMN SPINAL CORD STIMULATOR PERCUTANEOUS W IMPLANTABLE PULSE GENERATOR, RIGHT;  Surgeon: Carlos Alberto Jacome MD;  Location: [facility];  Service: Neurosurgery    TONSILLECTOMY      TUBAL LIGATION      UPPER GASTROINTESTINAL ENDOSCOPY  2020     Social History   Social History     Substance and Sexual Activity   Alcohol Use Not Currently    Comment: 2 x year; being a social drinker as per all scripts      Social History     Substance and Sexual Activity   Drug Use No     Social  History     Tobacco Use   Smoking Status Never   Smokeless Tobacco Never     Family History   Problem Relation Age of Onset    Colon cancer Mother     Alzheimer's disease Father     Stroke Father     No Known Problems Sister     Colon cancer Brother     Bipolar disorder Brother     No Known Problems Brother     Depression Paternal Grandfather     Breast cancer Maternal Aunt     Colon cancer Maternal Aunt     Seizures Son     Heart disease Other     Diabetes Other     Hypertension Other     Thyroid disease Neg Hx        Meds/Allergies       Current Outpatient Medications:     amLODIPine (NORVASC) 5 mg tablet    atorvastatin (LIPITOR) 40 mg tablet    busPIRone (BUSPAR) 15 mg tablet    cholecalciferol (VITAMIN D3) 1,000 units tablet    clonazePAM (KlonoPIN) 1 mg tablet    Diclofenac Sodium (VOLTAREN) 1 %    famotidine (PEPCID) 20 mg tablet    hydrOXYzine pamoate (VISTARIL) 50 mg capsule    Ingrezza 40 MG CAPS    linaCLOtide (Linzess) 290 MCG CAPS    melatonin 3 mg    polyethylene glycol (GLYCOLAX) 17 GM/SCOOP powder    polyethylene glycol (GOLYTELY) 4000 mL solution    polyethylene glycol (GOLYTELY) 4000 mL solution    prazosin (MINIPRESS) 5 mg capsule    QUEtiapine (SEROquel XR) 150 mg 24 hr tablet    QUEtiapine (SEROquel XR) 50 mg    sertraline (ZOLOFT) 100 mg tablet    topiramate (TOPAMAX) 100 mg tablet    traMADol (ULTRAM) 50 mg tablet    acetaminophen (TYLENOL) 500 mg tablet    aluminum-magnesium hydroxide 200-200 MG/5ML suspension    aspirin (Aspirin Low Dose) 81 mg EC tablet    mirtazapine (REMERON) 15 mg tablet    pantoprazole (PROTONIX) 40 mg tablet    pregabalin (LYRICA) 100 mg capsule    Current Facility-Administered Medications:     lactated ringers infusion, 50 mL/hr, Intravenous, Continuous    Facility-Administered Medications Ordered in Other Encounters:     lactated ringers infusion, , Intravenous, Continuous PRN, New Bag at 03/20/24 0901    No Known Allergies    Objective     BP (!) 142/101   Pulse 67    Resp 20   SpO2 97%       PHYSICAL EXAM    Gen: NAD  Head: NCAT  CV: RRR  CHEST: Clear  ABD: soft, NT/ND  EXT: no edema      ASSESSMENT/PLAN:  This is a 58 y.o. year old female here for EGD and colonoscopy, and she is stable and optimized for her procedure.

## 2024-03-22 PROCEDURE — 88305 TISSUE EXAM BY PATHOLOGIST: CPT | Performed by: PATHOLOGY

## 2024-04-22 DIAGNOSIS — R14.0 BLOATING: ICD-10-CM

## 2024-04-22 DIAGNOSIS — G89.4 CHRONIC PAIN DISORDER: ICD-10-CM

## 2024-04-24 DIAGNOSIS — K59.09 OTHER CONSTIPATION: ICD-10-CM

## 2024-04-24 RX ORDER — LINACLOTIDE 290 UG/1
290 CAPSULE, GELATIN COATED ORAL
Qty: 30 CAPSULE | Refills: 3 | Status: SHIPPED | OUTPATIENT
Start: 2024-04-24

## 2024-04-24 NOTE — TELEPHONE ENCOUNTER
Called and spoke to [facility] pharmacy med tech and per med tech the medication Voltaren is ready for  and the Aluminum-magnesium has to be bought OTC as her insurance does not cover the medication.

## 2024-04-24 NOTE — TELEPHONE ENCOUNTER
Called and spoke to patient, patient made aware as per note. Patient understood. Per patient she can not afford the medication OTC and prefers something the insurance will cover. Per patient Pepcid does not work.     Please review and advise

## 2024-04-26 ENCOUNTER — PATIENT OUTREACH (OUTPATIENT)
Dept: INTERNAL MEDICINE CLINIC | Facility: CLINIC | Age: 59
End: 2024-04-26

## 2024-04-26 ENCOUNTER — OFFICE VISIT (OUTPATIENT)
Dept: INTERNAL MEDICINE CLINIC | Facility: CLINIC | Age: 59
End: 2024-04-26

## 2024-04-26 VITALS
HEIGHT: 61 IN | OXYGEN SATURATION: 99 % | DIASTOLIC BLOOD PRESSURE: 86 MMHG | BODY MASS INDEX: 34.95 KG/M2 | WEIGHT: 185.13 LBS | HEART RATE: 74 BPM | SYSTOLIC BLOOD PRESSURE: 144 MMHG | RESPIRATION RATE: 18 BRPM | TEMPERATURE: 97.6 F

## 2024-04-26 DIAGNOSIS — L29.9 PRURITUS: ICD-10-CM

## 2024-04-26 DIAGNOSIS — F31.30 BIPOLAR I DISORDER, MOST RECENT EPISODE DEPRESSED (HCC): ICD-10-CM

## 2024-04-26 DIAGNOSIS — M54.50 CHRONIC LOW BACK PAIN, UNSPECIFIED BACK PAIN LATERALITY, UNSPECIFIED WHETHER SCIATICA PRESENT: Primary | ICD-10-CM

## 2024-04-26 DIAGNOSIS — G24.01 TARDIVE DYSKINESIA: ICD-10-CM

## 2024-04-26 DIAGNOSIS — G89.29 CHRONIC LOW BACK PAIN, UNSPECIFIED BACK PAIN LATERALITY, UNSPECIFIED WHETHER SCIATICA PRESENT: Primary | ICD-10-CM

## 2024-04-26 DIAGNOSIS — I10 ESSENTIAL HYPERTENSION: ICD-10-CM

## 2024-04-26 DIAGNOSIS — K13.79 MOUTH SORE: ICD-10-CM

## 2024-04-26 DIAGNOSIS — R10.9 ABDOMINAL PAIN, UNSPECIFIED ABDOMINAL LOCATION: ICD-10-CM

## 2024-04-26 DIAGNOSIS — M79.7 FIBROMYALGIA: ICD-10-CM

## 2024-04-26 RX ORDER — CHLORHEXIDINE GLUCONATE ORAL RINSE 1.2 MG/ML
15 SOLUTION DENTAL 2 TIMES DAILY
Qty: 120 ML | Refills: 0 | Status: SHIPPED | OUTPATIENT
Start: 2024-04-26

## 2024-04-26 RX ORDER — METHOCARBAMOL 500 MG/1
500 TABLET, FILM COATED ORAL 4 TIMES DAILY
Qty: 56 TABLET | Refills: 0 | Status: SHIPPED | OUTPATIENT
Start: 2024-04-26 | End: 2024-05-10

## 2024-04-26 RX ORDER — AMLODIPINE BESYLATE 5 MG/1
5 TABLET ORAL DAILY
Qty: 30 TABLET | Refills: 2 | Status: SHIPPED | OUTPATIENT
Start: 2024-04-26 | End: 2024-07-25

## 2024-04-26 RX ORDER — DIPHENHYDRAMINE HCL 25 MG
25 TABLET ORAL EVERY 6 HOURS PRN
Qty: 30 TABLET | Refills: 0 | Status: CANCELLED | OUTPATIENT
Start: 2024-04-26

## 2024-04-26 RX ORDER — PREGABALIN 100 MG/1
100 CAPSULE ORAL 3 TIMES DAILY
Qty: 30 CAPSULE | Refills: 0 | Status: SHIPPED | OUTPATIENT
Start: 2024-04-26 | End: 2024-05-06

## 2024-04-26 RX ORDER — LORATADINE 10 MG/1
10 TABLET ORAL DAILY
Qty: 30 TABLET | Refills: 2 | Status: SHIPPED | OUTPATIENT
Start: 2024-04-26 | End: 2024-07-25

## 2024-04-26 RX ORDER — VALBENAZINE 40 MG/1
40 CAPSULE ORAL DAILY
Qty: 30 CAPSULE | Refills: 5 | Status: SHIPPED | OUTPATIENT
Start: 2024-04-26

## 2024-04-26 RX ORDER — DICYCLOMINE HYDROCHLORIDE 10 MG/1
10 CAPSULE ORAL
Qty: 120 CAPSULE | Refills: 0 | Status: SHIPPED | OUTPATIENT
Start: 2024-04-26 | End: 2024-05-26

## 2024-04-26 NOTE — PROGRESS NOTES
"  [facility]  INTERNAL MEDICINE RESIDENCY    Clinic Visit Note  Samantha Rodriguez 58 y.o. female   MRN: [id number]    Assessment and Plan      1. Chronic low back pain, unspecified back pain laterality, unspecified whether sciatica present: Patient has a history of chronic pain and opioid dependence. S/p thoracolumbar decompression and fusion in 2018 in [place] with subsequent laminectomy syndrome, s/p thoracic spinal cord stimulator placement in July 2020. Analgesic regimen currently is Lyrica 100 mg TID, Voltaren gel, Tylenol 500 mg Q8H PRN, and tramadol 50 mg Q6H PRN severe pain. She currently complains of uncontrolled pain and is requesting refill on Lyrica, she states she ran out of it a month ago. She appears to use the Lyrica intermittently as it has not been filled since November for 30 capsules by a geriatrician Martha Braden PA-C.   Will place referral to comprehensive spine program  Refill Lyrica 100 mg TID x10 days   Short course of Robaxin 500 mg QID PRN x2 weeks   Continue analgesic regimen as mentioned above  Will order cane via [employer]   Follow-up in 6 months   -     Ambulatory Referral to Comprehensive Spine Program; Future  -     methocarbamol (ROBAXIN) 500 mg tablet; Take 1 tablet (500 mg total) by mouth 4 (four) times a day for 14 days    2. Abdominal pain, unspecified abdominal location: Patient was recently seen by GI 2/20/24 for abdominal pain and constipation. She has chronic constipation and was previously reporting having a BM every \"few weeks,\" so she was prescribed a GoLytely bowel cleanse and Linzess 290 mcg daily. She reports bloating that is chronic in nature but has worsened recently. Reviewed recent celiac serologies from 2/20/24 which were negative. We trialed Maalox at last visit and she said this has not helped much. Pt underwent recent colonoscopy and EGD as follows. 3/20/24 colonoscopy: 2 subcentimeter polyps in transverse colon removed, mild diverticulosis in " sigmoid colon. Pathology pending, due for repeat in 5 years. 3/20/24 EGD: Found 3 cm type 1 hiatal hernia with moderate edematous and erythematous mucosa in the body of the stomach, incisura and antrum, s/p biopsies. Biopsies pending. GI said to consider changing PPIs if increased fose of Protonix does not help or consider surgery for hiatal hernia and pH testing.   Start Bentyl   Consider future testing for SIBO    -     dicyclomine (BENTYL) 10 mg capsule; Take 1 capsule (10 mg total) by mouth 4 (four) times a day (before meals and at bedtime)    3. Essential hypertension: Noncompliant with medication. BP elevated today. Refill amlodipine and educated on importance of compliance.   -     amLODIPine (NORVASC) 5 mg tablet; Take 1 tablet (5 mg total) by mouth daily    4. Pruritus  -     loratadine (CLARITIN) 10 mg tablet; Take 1 tablet (10 mg total) by mouth daily    5. Tardive dyskinesia  -     Ingrezza 40 MG CAPS; Take 40 mg by mouth in the morning    6. Fibromyalgia  -     pregabalin (LYRICA) 100 mg capsule; Take 1 capsule (100 mg total) by mouth 3 (three) times a day for 10 days    7. Mouth sore  -     chlorhexidine (PERIDEX) 0.12 % solution; Apply 15 mL to the mouth or throat 2 (two) times a day    8. Bipolar I disorder, most recent episode depressed (HCC)        Follow up in our clinic in 6 month(s) for  medicare wellness .    Subjective   HISTORY OF PRESENT ILLNESS:  Samantha Rodriguez is a 58 y.o. female with a past medical history of bilateral knee osteoarthritis s/p bilateral knee replacements, chronic pain syndrome, opioid dependence, tardive dyskinesia, hypertension, hyperlipidemia, and prior CVA  who presents to clinic today for back pain.    Patient reports chronic uncontrolled back pain for many years and has been seen previously for similar complaints. She requests a cane as she feels this would be more comfortable for ambulation compared to her Rolator. She reports the pain is located in upper back to  "low back and hips. She is not participating in therapy at this time. She requests refill on Lyrica which she reports she ran out of a month ago. She also requests a muscle relaxer.     She also complains of persistent abdominal bloating/cramping. She would like another medication other than Mylanta. We reviewed her recent EGD/colonoscopy results.     We reviewed that her BP is elevated today and she reports that she has not taken her amlodipine for at least a few days. She needs a refill on amlodipine. No other end-organ complaints.     Review of Systems - Negative other than stated above    Objective     Vitals:    04/26/24 1023   BP: 144/86   Pulse: 74   Resp: 18   Temp: 97.6 °F (36.4 °C)   TempSrc: Temporal   SpO2: 99%   Weight: 84 kg (185 lb 2 oz)   Height: 5' 1\" (1.549 m)       Physical Exam:  General: No apparent distress, resting comfortably   Head: Normocephalic, atraumatic  Eyes: Anicteric, no conjunctival erythema  ENT: External ear normal, no nasal discharge  Neck: Trachea midline, no visible lymphadenopathy or goiter  Respiratory:  Non-labored respirations, symmetric thorax expansion  Cardiovascular:  Extremities appear well-perfused  Abdomen: Non-distended  Extremities: Tenderness to palpation paraspinal muscles. Antalgic gait. No step-off sign.   Skin: No visible rashes, wounds, or jaundice  Neuro: Tardive dyskinesia. A&O x 3, no gross focal deficits, strength intact    History     Current Outpatient Medications:     amLODIPine (NORVASC) 5 mg tablet, Take 1 tablet (5 mg total) by mouth daily, Disp: 30 tablet, Rfl: 2    chlorhexidine (PERIDEX) 0.12 % solution, Apply 15 mL to the mouth or throat 2 (two) times a day, Disp: 120 mL, Rfl: 0    dicyclomine (BENTYL) 10 mg capsule, Take 1 capsule (10 mg total) by mouth 4 (four) times a day (before meals and at bedtime), Disp: 120 capsule, Rfl: 0    Ingrezza 40 MG CAPS, Take 40 mg by mouth in the morning, Disp: 30 capsule, Rfl: 5    loratadine (CLARITIN) 10 mg " tablet, Take 1 tablet (10 mg total) by mouth daily, Disp: 30 tablet, Rfl: 2    methocarbamol (ROBAXIN) 500 mg tablet, Take 1 tablet (500 mg total) by mouth 4 (four) times a day for 14 days, Disp: 56 tablet, Rfl: 0    pregabalin (LYRICA) 100 mg capsule, Take 1 capsule (100 mg total) by mouth 3 (three) times a day for 10 days, Disp: 30 capsule, Rfl: 0    acetaminophen (TYLENOL) 500 mg tablet, Take 1 tablet (500 mg total) by mouth every 8 (eight) hours as needed for mild pain, Disp: 180 tablet, Rfl: 1    aluminum-magnesium hydroxide 200-200 MG/5ML suspension, Take 5 mL by mouth every 6 (six) hours as needed for heartburn, Disp: 355 mL, Rfl: 0    aspirin (Aspirin Low Dose) 81 mg EC tablet, Take 1 tablet (81 mg total) by mouth daily, Disp: 30 tablet, Rfl: 0    atorvastatin (LIPITOR) 40 mg tablet, Take 1 tablet (40 mg total) by mouth daily, Disp: 30 tablet, Rfl: 0    busPIRone (BUSPAR) 15 mg tablet, , Disp: , Rfl:     cholecalciferol (VITAMIN D3) 1,000 units tablet, Take 1 tablet (1,000 Units total) by mouth daily Do not start before December 10, 2023., Disp: 30 tablet, Rfl: 0    clonazePAM (KlonoPIN) 1 mg tablet, Take 1 tablet (1 mg total) by mouth 2 (two) times a day for 10 days, Disp: 20 tablet, Rfl: 0    Diclofenac Sodium (VOLTAREN) 1 %, Apply 2 g topically 4 (four) times a day as needed (pain), Disp: 150 g, Rfl: 0    famotidine (PEPCID) 20 mg tablet, Take 2 tablets (40 mg total) by mouth daily at bedtime, Disp: 60 tablet, Rfl: 3    hydrOXYzine pamoate (VISTARIL) 50 mg capsule, Take 50 mg by mouth 3 (three) times a day, Disp: , Rfl:     linaCLOtide (Linzess) 290 MCG CAPS, Take 1 capsule by mouth daily before breakfast, Disp: 30 capsule, Rfl: 3    melatonin 3 mg, Take 2 tablets (6 mg total) by mouth daily at bedtime, Disp: 60 tablet, Rfl: 0    mirtazapine (REMERON) 15 mg tablet, TAKE 1/2 TAB (7.5MG) AT BEDTIME, Disp: , Rfl:     pantoprazole (PROTONIX) 40 mg tablet, Take 1 tablet (40 mg total) by mouth 2 (two) times a  day before meals, Disp: 60 tablet, Rfl: 2    polyethylene glycol (GLYCOLAX) 17 GM/SCOOP powder, Take 17 g by mouth daily, Disp: 510 g, Rfl: 2    polyethylene glycol (GOLYTELY) 4000 mL solution, Take as instructed on bowel preparation instructions, Disp: 4000 mL, Rfl: 0    polyethylene glycol (GOLYTELY) 4000 mL solution, Take as instructed on bowel preparation instruction, Disp: 4000 mL, Rfl: 0    prazosin (MINIPRESS) 5 mg capsule, , Disp: , Rfl:     QUEtiapine (SEROquel XR) 150 mg 24 hr tablet, , Disp: , Rfl:     QUEtiapine (SEROquel XR) 50 mg, Take 50 mg by mouth daily at bedtime, Disp: , Rfl:     sertraline (ZOLOFT) 100 mg tablet, , Disp: , Rfl:     topiramate (TOPAMAX) 100 mg tablet, , Disp: , Rfl:     traMADol (ULTRAM) 50 mg tablet, , Disp: , Rfl:   No Known Allergies   Past Medical History:   Diagnosis Date    Anxiety     Arthritis     left knee    Arthritis of right knee 10/6/2020    At risk for falls     Bipolar 2 disorder (HCC)     FOLLOWS WITH PSYCHIATRIST. CONTINUE LAMOTRIGINE; RESOLVED: 28JAN2016    Depression     Familial tremor     both hands    Fibromyalgia     LAST ASSESSED: 08DEC2017    GERD (gastroesophageal reflux disease)     Hearing aid worn     left ear    [place] (hard of hearing)     left ear    Hyperlipidemia     Hypertension     Left-sided weakness     Lumbar disc disease with radiculopathy 2/2/2018    Memory loss of unknown cause     long and short term    Migraine     Multiple closed fractures of metatarsal bone of right foot 5/2/2021    Obesity     Obesity, Class II, BMI 35-39.9     Osteoarthritis of both hips 10/31/2016    Osteoarthritis of knee 2/20/2013    Description: Continue Tylenol and Naproxen. Encourage exercise and weight loss. Patient refused physical therapy. I will refer the patient back to Orthopedics.    Overactive bladder     Panic attack     Patellofemoral disorder of both knees 5/1/2020    Post traumatic stress disorder     Primary localized osteoarthritis of both knees  2017    Primary osteoarthritis of both knees 2020    S/P insertion of spinal cord stimulator     no remote    S/P total knee arthroplasty, right 3/10/2022    Sacroiliitis (HCC) 2017    Seasonal allergies     Seizure-like activity (HCC) 6/3/2022    Seizures (HCC)     possible seizure like activity    Small bowel obstruction (HCC) 3/24/2023    Status post total knee replacement, left 2022    Stroke (HCC)     questionable stroke     Tardive dyskinesia     PATIENT STATES    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  CONT ASA 81 MG DAILY; RESOLVED: 2015    Urinary incontinence     Uses walker     Wears dentures     partial lower / full upper- doesnt wear    Wears glasses      Past Surgical History:   Procedure Laterality Date    BACK SURGERY       SECTION      COLONOSCOPY      RESOLVED: 2016    EAR SURGERY      EGD      HYSTERECTOMY      MYRINGOTOMY W/ TUBES Left     NECK SURGERY  2019    MO ARTHRP KNE CONDYLE&PLATU MEDIAL&LAT COMPARTMENTS Right 3/9/2022    Procedure: ARTHROPLASTY KNEE TOTAL;  Surgeon: Chandni Tuttle DO;  [facility];  Service: Orthopedics    MO ARTHRP KNE CONDYLE&PLATU MEDIAL&LAT COMPARTMENTS Left 2022    Procedure: ARTHROPLASTY KNEE TOTAL;  Surgeon: Chandni Tuttle DO;  [facility];  Service: Orthopedics    MO CYSTOURETHROSCOPY N/A 2016    Procedure: CYSTOSCOPY, BOTOX INJECTION;  Surgeon: Abraham Teague MD;  [facility];  Service: Gynecology    MO INSJ/RPLCMT SPINAL NPG/RCVR POCKET CRTJ&CONNJ Right 2/10/2021    Procedure: REPLACEMENT IMPLANTABLE PULSE GENERATOR DORSAL SPINAL COLUMN STIMULATOR, RIGHT;  Surgeon: Carlos Alberto Jacome MD;  [facility]: [facility];  Service: Neurosurgery    MO PRQ IMPLTJ NSTIM ELECTRODE ARRAY EPIDURAL Right 2020    Procedure: INSERTION THORACIC DORSAL COLUMN SPINAL CORD STIMULATOR PERCUTANEOUS W IMPLANTABLE PULSE GENERATOR, RIGHT;  Surgeon: Carlos Alberto Jacome  MD;  Location:  MAIN OR;  Service: Neurosurgery    TONSILLECTOMY      TUBAL LIGATION  1986    UPPER GASTROINTESTINAL ENDOSCOPY  09/2020     Social History     Socioeconomic History    Marital status:      Spouse name: Not on file    Number of children: 2    Years of education: graduate school     Highest education level: Not on file   Occupational History    Occupation: Disabled   Tobacco Use    Smoking status: Never    Smokeless tobacco: Never   Vaping Use    Vaping status: Never Used   Substance and Sexual Activity    Alcohol use: Not Currently     Comment: 2 x year; being a social drinker as per all scripts     Drug use: No    Sexual activity: Not Currently   Other Topics Concern    Not on file   Social History Narrative    Bereavement    Daily caffeine consumption, 6-8 servings per day     as per all scripts    Lives in [place]      Social Determinants of Health     Financial Resource Strain: Low Risk  (2/19/2024)    Overall Financial Resource Strain (CARDIA)     Difficulty of Paying Living Expenses: Not hard at all   Food Insecurity: No Food Insecurity (2/19/2024)    Hunger Vital Sign     Worried About Running Out of Food in the Last Year: Never true     Ran Out of Food in the Last Year: Never true   Transportation Needs: No Transportation Needs (2/19/2024)    PRAPARE - Transportation     Lack of Transportation (Medical): No     Lack of Transportation (Non-Medical): No   Physical Activity: Insufficiently Active (2/19/2024)    Exercise Vital Sign     Days of Exercise per Week: 5 days     Minutes of Exercise per Session: 20 min   Stress: No Stress Concern Present (2/19/2024)    [employer] [place] [employer] - Occupational Stress Questionnaire     Feeling of Stress : Not at all   Social Connections: Socially Isolated (2/19/2024)    Social Connection and Isolation Panel [NHANES]     Frequency of Communication with Friends and Family: More than three times a week     Frequency of  Social Gatherings with Friends and Family: More than three times a week     Attends Jew Services: Never     Active Member of Clubs or Organizations: No     Attends Club or Organization Meetings: Never     Marital Status:    Intimate Partner Violence: Not At Risk (2/19/2024)    Humiliation, Afraid, Rape, and Kick questionnaire     Fear of Current or Ex-Partner: No     Emotionally Abused: No     Physically Abused: No     Sexually Abused: No   Housing Stability: Low Risk  (2/19/2024)    Housing Stability Vital Sign     Unable to Pay for Housing in the Last Year: No     Number of Places Lived in the Last Year: 2     Unstable Housing in the Last Year: No     Family History   Problem Relation Age of Onset    Colon cancer Mother     Alzheimer's disease Father     Stroke Father     No Known Problems Sister     Colon cancer Brother     Bipolar disorder Brother     No Known Problems Brother     Depression Paternal Grandfather     Breast cancer Maternal Aunt     Colon cancer Maternal Aunt     Seizures Son     Heart disease Other     Diabetes Other     Hypertension Other     Thyroid disease Neg Hx        Glo Valente DO  [facility] Internal Medicine PGY-3  [address]  [address]  [place] [address]    PLEASE NOTE:  This encounter was completed utilizing the [employer] Direct Speech Voice Recognition Software. Grammatical errors, random word insertions, pronoun errors and incomplete sentences are occasional consequences of the system due to software limitations, ambient noise and hardware issues.These may be missed by proof reading prior to affixing electronic signature. Any questions or concerns about the content, text or information contained within the body of this dictation should be directly addressed to the physician for clarification. Please do not hesitate to call me directly if you have any any questions or concerns.      74

## 2024-04-26 NOTE — PROGRESS NOTES
SW received Text Message from PCP this date to reach out to pt as per her request re her financial situation.  He shared pt is living with a friend for the next 2 weeks.  Previously she was staying with her son until she moved in with her dgt in a home is [place].  Pt was also reaching out to re-establish with her prior MH Provider at [facility].  SW has attempted to reach pt @ [phone] and her dgt Tunde ( E) [phone] but had to leave a message.

## 2024-04-29 ENCOUNTER — TELEPHONE (OUTPATIENT)
Dept: PHYSICAL THERAPY | Facility: OTHER | Age: 59
End: 2024-04-29

## 2024-04-29 ENCOUNTER — PATIENT OUTREACH (OUTPATIENT)
Dept: INTERNAL MEDICINE CLINIC | Facility: CLINIC | Age: 59
End: 2024-04-29

## 2024-04-29 NOTE — TELEPHONE ENCOUNTER
Call placed to the patient per Comprehensive Spine Program referral.    Spoke with Prachi Rodriguez (pt's daughter) she is in communication consent.    Message left for patient to call back. Phone number and hours of business provided.    This is the 1st attempt to reach the patient. Will defer referral per protocol.

## 2024-05-01 ENCOUNTER — TELEPHONE (OUTPATIENT)
Dept: PSYCHIATRY | Facility: CLINIC | Age: 59
End: 2024-05-01

## 2024-05-01 ENCOUNTER — TELEPHONE (OUTPATIENT)
Dept: INTERNAL MEDICINE CLINIC | Facility: CLINIC | Age: 59
End: 2024-05-01

## 2024-05-01 ENCOUNTER — PATIENT OUTREACH (OUTPATIENT)
Dept: INTERNAL MEDICINE CLINIC | Facility: CLINIC | Age: 59
End: 2024-05-01

## 2024-05-01 DIAGNOSIS — Z78.9 NEEDS ASSISTANCE WITH COMMUNITY RESOURCES: Primary | ICD-10-CM

## 2024-05-01 DIAGNOSIS — Z86.73 HISTORY OF CVA (CEREBROVASCULAR ACCIDENT): ICD-10-CM

## 2024-05-01 NOTE — PROGRESS NOTES
HIRAM has reached out to pt and dgt  Cain who shares that pt is still living with her dgt and children at another family member's home until they move into a new apt.  They had lost the one they had helped to move into because pt did not have her SS Income at that time.  Pt's dgt has been become her new rep payee for her Social Security payments.  Pt is expecting a payment in May.  They are aware that her amount can change due to the change in her family composition.  Pt has also NOT re-established with OP MH.  Pt said she had but it was not clear if she is in their list.  Hiram suggested the dgt call with pt to explore if she can re-establish or get on their waiting list.  HIRAM provided their contact info [facility] [phone] or direct [phone]  SW also offered to IN BASKET to [facility] to add her to their list. Sw to also mail pt a list of OP MH Providers the dgt will help pt call to find a new provider.  HIRAM has also suggested pt/dgt call her [facility] Program to see if she is still enrolled. HIRAM provided contact info for Isabella Bustos RN  [facility] [phone] and Carie Dawson [facility] Supervisor  [phone].  HIRAM has also suggested that pt call her [place] WAIVER   Casi Jaquez [phone]  [email or web address] to see if pt is still active on the [place] Waiver program.    Pt indicates she feels she does not need this help.  HIRAM has advised that now the her dgt is not able to be a paid caregiver as she is now her Rep Payee.  Since family does work and pt is still a fall risk SW suggest she allows additional helpers.  They had [facility] as the care provider.    Patient does not have any further questions, concerns, or other needs at this time.  Patient has my contact # and PCP office # if needed.  Social Work to remain available to assist as indicated.    Please re-consult Social Work if needed.

## 2024-05-01 NOTE — TELEPHONE ENCOUNTER
Received call from patient's [facility] pharmacy requesting if patient is still on Atorvastatin 40 mg tablet. Patient is overdue for refill by 2 months.     Please review and advise if patient should be taking medication. Will need refill.

## 2024-05-02 ENCOUNTER — TELEPHONE (OUTPATIENT)
Dept: INTERNAL MEDICINE CLINIC | Facility: CLINIC | Age: 59
End: 2024-05-02

## 2024-05-02 DIAGNOSIS — M54.50 CHRONIC LOW BACK PAIN, UNSPECIFIED BACK PAIN LATERALITY, UNSPECIFIED WHETHER SCIATICA PRESENT: Primary | ICD-10-CM

## 2024-05-02 DIAGNOSIS — G89.29 CHRONIC LOW BACK PAIN, UNSPECIFIED BACK PAIN LATERALITY, UNSPECIFIED WHETHER SCIATICA PRESENT: Primary | ICD-10-CM

## 2024-05-02 NOTE — TELEPHONE ENCOUNTER
Patient called office explaining she was here 4/26 and the provider stated he would send her an order for a cane. I don't see anything about a cane in the chart, please review and see if a script can be sent or I there was misunderstanding. Please call pt to update

## 2024-05-03 NOTE — TELEPHONE ENCOUNTER
Patient called again looking for the status of the cane, saw the order but it was discontinued. Told her I would add on to already made task and let the back know she called again.

## 2024-05-03 NOTE — TELEPHONE ENCOUNTER
Left voice message with patient requesting callback with name of medication she needs refilled. Waiting for callback.

## 2024-05-03 NOTE — TELEPHONE ENCOUNTER
Script for [employer] cane sent to [employer] for review. Order pending.     Left voice message with patient re:cane order update.

## 2024-05-06 NOTE — TELEPHONE ENCOUNTER
Per [employer]- will not cover a cane due to patient recently getting a roller walker.  Insurance does not allow you to go backward with equipment. Because she received the walker, she will not be eligible for a cane, however, she is eligible for a new walker on 3/28/2027.    Attempted to reach patient at . Left voice message and office number for follow up.

## 2024-05-06 NOTE — TELEPHONE ENCOUNTER
Left another voice message with patient at . Waiting on callback to discuss BP medication needed and clarify Atorvastatin 40 mg.

## 2024-05-07 ENCOUNTER — PATIENT OUTREACH (OUTPATIENT)
Dept: INTERNAL MEDICINE CLINIC | Facility: CLINIC | Age: 59
End: 2024-05-07

## 2024-05-07 RX ORDER — ATORVASTATIN CALCIUM 40 MG/1
40 TABLET, FILM COATED ORAL DAILY
Qty: 30 TABLET | Refills: 3 | Status: SHIPPED | OUTPATIENT
Start: 2024-05-07

## 2024-05-07 NOTE — TELEPHONE ENCOUNTER
Received call from patient. Patient requesting a refill of her blood pressure medication amlodipine. Patient updated amlodipine was sent to [facility] pharmacy in [place] on 4/26/24.     Nurse discussed with patient if she was taking atorvastatin (Lipitor). Patient stated she was taking medication for her cholesterol.     Patient updated script will be sent to her pharmacy also.     All questions/concerns answered at this time.     Per physician, patient should be taking atorvastatin as prescribed.

## 2024-05-07 NOTE — PROGRESS NOTES
"SW returned call to pt this date re her VM where she was very upset that she understood PCP has sent a Form to the Social Security Office that she could not handle her affairs.      SW did reach out to pt who agreed to speak to SW re her concerns.    Pt was upset that she was not able to get her ability to manage her SS benefits again.   She said since that time the benefit have been lowered.  SW did share that PCP had completed the [employer] Opinion of Patient's Capability that she did have this ability.     HIRAM suggested we call the [employer] [employer] Office to see if they received it and inform them we will refax if needed.  HIRAM made a 3 way call to the Social Security Office [phone] and spoke to Tammie who checked that they did not have the form Dr. Valente completed.    They did have a form from a Dr. Preciado 12/2023 from [place] [employer].    We were provided the FAX # [phone] to [place] to the Attention of Mr Quiñonez .  Confirmation received.    Pt also discussed with Tammie the concern she has about the reduction of her SS benefit.  Tammie shared the pt had not completed the info needed previously so that is they the charged her the highest amount.    Social Security will send pt a new form for her and her son Mick to complete at the  [place] address.    Pt shares that she spoke to [facility] and they are working on getting her a new provider.  She thinks this will be soon.  She has medications until the middle of May.  SW advised letting her PCP know if she is in jeopardy of running out.    Pt has also spoken to Clinical Team re getting a new \" donated cane with a seat \" if possible.  HIRAM note Garima RN has spoken to pt re same.    Sw to assist if needed.    HIRAM asked if pt wanted to reactivate the [place] Program and pt declines as she does not feel she needs this help.    Pt to f/u with HIRAM re the out come of her Social Security when they contact her back.    "

## 2024-05-07 NOTE — TELEPHONE ENCOUNTER
Received call from patient. Introduced self and role. Patient updated quad cane order was sent to [employer] [facility] [employer] reviewed and unable to fill order due to patient receiving a roller walker within the past 5 years.     Nurse discussed with patient other options such as [employer] stores, pharmacies and [employer] to purchase a quad cane. Patient stated she is on a fixed income and not able to afford quad cane out of pocket. Patient aware that nurse will reach out to  for another option.     Spoke with  and provided contact number for [facility] at . Left voice message with Candice. Waiting for callback.

## 2024-05-10 ENCOUNTER — PATIENT OUTREACH (OUTPATIENT)
Dept: INTERNAL MEDICINE CLINIC | Facility: CLINIC | Age: 59
End: 2024-05-10

## 2024-05-10 NOTE — PROGRESS NOTES
SW received call from pt who was requesting assistance to get her psych medications refilled.  Pt shared she was speaking with Amy Grant [phone] from [facility] [address] re getting an appointment.  Pt not clear if she is on the waiting list.  SW has [place] Text Clinical Team to ask for help from PCP re medications refill.  MOISÉS has also called Amy Grant to ask if pt is on there list and if they will be able to accept her back?  MOISÉS had to leave a message .    Moisés also called the main # [phone] and also left the same message.    SW to f/u with pt to see if additional referrals needed for OP MH TX

## 2024-05-13 NOTE — TELEPHONE ENCOUNTER
Received call from patient. Patient updated nurse received call from Candice with [facility]. [facility] has a quad cane available for patient. Nurse discussed with patient what a quad cane looks like. Patient aware no seat on quad cane. Patient would like the quad cane but does not have transportation. Patient stated she would have to get an Uber. Patient updated nurse will reach out to  about quad cane/delivery to home    All questions answered at this time.

## 2024-05-13 NOTE — TELEPHONE ENCOUNTER
Received voice message from Candice with [facility] at  ext 111. Center has a quad cane available for patient. Patient or staff would have to  quad cane from center.     Attempted to reach Candice at  ext 111. Left voice message with callback number.     Left voice message with patient to discuss quad cane/setting up  of DME. Waiting for callback.

## 2024-05-14 ENCOUNTER — PATIENT OUTREACH (OUTPATIENT)
Dept: INTERNAL MEDICINE CLINIC | Facility: CLINIC | Age: 59
End: 2024-05-14

## 2024-05-14 DIAGNOSIS — Z78.9 NEEDS ASSISTANCE WITH COMMUNITY RESOURCES: Primary | ICD-10-CM

## 2024-05-14 NOTE — PROGRESS NOTES
"Case reviewed with Garima Andrade RN who has researched finding a donated cane for pt  through [facility].  HIRAM has confirmed with Candice of [facility] @ [phone]  that they have one and our CMOC can make arrangement to pick it up from them and to deliver it to pt.  CMOC would need to make an appointment with Candice  and to have to sign she received and to get confirmation back to them.  SW top ask CMOC to assist with same.    HIRAM also received a  message form Amy Hernandez from [facility] [address]  [phone]  they will not be able to re-accept pt as they \"can not meet her needs\".    HIRAM has reached out to pt re same.    Pt's dgt Tunde answered as they are sharing a phone at he moment.  HIRAM did relay above infi re the cane being available through the [facility] . Hiram also  shared the [facility] will not be able to take her back.    Hiram has suggested the pt call [facility] [phone] located at [address].  In addition, HIRAM suggested she call they main # to ask about her psych medication refills.    As per dgt request HIRAM called back and SW left a message re above.   SW to remain available to assist as indicated.      "

## 2024-05-17 ENCOUNTER — PATIENT OUTREACH (OUTPATIENT)
Dept: INTERNAL MEDICINE CLINIC | Facility: CLINIC | Age: 59
End: 2024-05-17

## 2024-05-17 ENCOUNTER — TELEPHONE (OUTPATIENT)
Dept: INTERNAL MEDICINE CLINIC | Facility: CLINIC | Age: 59
End: 2024-05-17

## 2024-05-17 NOTE — PROGRESS NOTES
HIRAM has received message from pt who shares her new temporary address is [address] [place] [address] [place] [address].  HIRAM has spoken with [facility] who will reach out to the  [facility] and  same as soon as possible.  CM Team to f/u amd assist as indicated.

## 2024-05-17 NOTE — TELEPHONE ENCOUNTER
Received a Tiger Text from [facility],  stating patient left a message on her voicemail regarding medication. She asked if clinical can reach out to her. I called patient and she did not answer. LMOM to call back.

## 2024-05-20 ENCOUNTER — PATIENT OUTREACH (OUTPATIENT)
Dept: INTERNAL MEDICINE CLINIC | Facility: CLINIC | Age: 59
End: 2024-05-20

## 2024-05-20 NOTE — PROGRESS NOTES
Outgoing call   05/20/2024    CMOC left message for Candice YANG at [facility] at [phone] ext [phone].     CMOC would like to know when I can  the donated cane.     Next outreach is schedule for 05/27/2024

## 2024-05-21 ENCOUNTER — PATIENT OUTREACH (OUTPATIENT)
Dept: INTERNAL MEDICINE CLINIC | Facility: CLINIC | Age: 59
End: 2024-05-21

## 2024-05-21 DIAGNOSIS — M54.50 CHRONIC LOW BACK PAIN, UNSPECIFIED BACK PAIN LATERALITY, UNSPECIFIED WHETHER SCIATICA PRESENT: ICD-10-CM

## 2024-05-21 DIAGNOSIS — M79.7 FIBROMYALGIA: ICD-10-CM

## 2024-05-21 DIAGNOSIS — G89.29 CHRONIC LOW BACK PAIN, UNSPECIFIED BACK PAIN LATERALITY, UNSPECIFIED WHETHER SCIATICA PRESENT: ICD-10-CM

## 2024-05-21 DIAGNOSIS — F41.0 GENERALIZED ANXIETY DISORDER WITH PANIC ATTACKS: ICD-10-CM

## 2024-05-21 DIAGNOSIS — K21.9 GERD WITHOUT ESOPHAGITIS: ICD-10-CM

## 2024-05-21 DIAGNOSIS — F41.1 GENERALIZED ANXIETY DISORDER WITH PANIC ATTACKS: ICD-10-CM

## 2024-05-21 NOTE — PROGRESS NOTES
Incoming call   05/21/2024    [facility] received incoming call from Candice from [facility]. She states I can  the cane on Thursday May 23, 2024 at 1:pm.     CMOC left message for patient to call me back.     Next outreach is schedule for 05/23/2024

## 2024-05-21 NOTE — PROGRESS NOTES
HIRAM has received call from pt who is asking if her MH medication are going to be refilled?  HIRAM has reached out to Clinical Team who had left a message for pt.    Yaya has spoken to pt teri montaño.    HIRAM has also assisted pt with a call to [facility]  [phone]  @ [address] [place] .    They have indicated they take her insurance.  Pt has to create an account/application which her dgt Tunde assisted with to provide her Insurance info and them they need to call Carie at [facility] back @ [phone] and after they verify her insurance they will set up an appointment.  Pt/dgt to get back to HIRAM re same.  HIRAM also updated them the [facility] is planning to  cane at [facility] on Thursday.    Cm Team to f/u and assist as indicated.

## 2024-05-21 NOTE — TELEPHONE ENCOUNTER
Please review and advise if appropriate to refill, patient is currently working with [facility] to find a new Mental health facility.

## 2024-05-22 RX ORDER — METHOCARBAMOL 500 MG/1
500 TABLET, FILM COATED ORAL 4 TIMES DAILY
Qty: 56 TABLET | Refills: 0 | OUTPATIENT
Start: 2024-05-22 | End: 2024-06-05

## 2024-05-22 RX ORDER — FAMOTIDINE 20 MG/1
40 TABLET, FILM COATED ORAL
Qty: 60 TABLET | Refills: 3 | Status: SHIPPED | OUTPATIENT
Start: 2024-05-22

## 2024-05-22 NOTE — TELEPHONE ENCOUNTER
PDMP reviewed. Last filled 4/26/24. Due 5/6/24.     Per [place] note on 4/26/24, Lyrica ordered for total of 10 days. Patient stating she takes 2 tablets but ordered only 1 tablet TID. Please review and advise if appropriate for refill. Thank you!

## 2024-05-23 ENCOUNTER — PATIENT OUTREACH (OUTPATIENT)
Dept: INTERNAL MEDICINE CLINIC | Facility: CLINIC | Age: 59
End: 2024-05-23

## 2024-05-23 DIAGNOSIS — R10.9 ABDOMINAL PAIN, UNSPECIFIED ABDOMINAL LOCATION: ICD-10-CM

## 2024-05-23 DIAGNOSIS — G24.01 TARDIVE DYSKINESIA: ICD-10-CM

## 2024-05-23 RX ORDER — DICYCLOMINE HYDROCHLORIDE 10 MG/1
CAPSULE ORAL
Qty: 120 CAPSULE | Refills: 0 | Status: SHIPPED | OUTPATIENT
Start: 2024-05-23

## 2024-05-23 RX ORDER — VALBENAZINE 40 MG/1
40 CAPSULE ORAL DAILY
Qty: 30 CAPSULE | Refills: 5 | Status: SHIPPED | OUTPATIENT
Start: 2024-05-23

## 2024-05-23 NOTE — PROGRESS NOTES
Outgoing call  05/23/2024    CMOC spoke to patient to let her know I will be picking up the donated cane and dropping it at her house this afternoon. Pt needs to sign a form for [facility].     CMOC picked up 2 donated canes. A standard cane and a quad cane. CMOC brought canes to patient. Pt was very satisfied.     Pt needed to sign a general release of liability agreement and this has been secured e-mail back to Candice Dickinson at [facility].     All Goals have been completed and [facility] will close case at this time.

## 2024-05-24 DIAGNOSIS — F41.0 GENERALIZED ANXIETY DISORDER WITH PANIC ATTACKS: Primary | ICD-10-CM

## 2024-05-24 DIAGNOSIS — F41.1 GENERALIZED ANXIETY DISORDER WITH PANIC ATTACKS: Primary | ICD-10-CM

## 2024-05-24 RX ORDER — MIRTAZAPINE 15 MG/1
7.5 TABLET, FILM COATED ORAL
Qty: 30 TABLET | Refills: 0 | Status: SHIPPED | OUTPATIENT
Start: 2024-05-24 | End: 2024-05-28 | Stop reason: SDUPTHER

## 2024-05-28 ENCOUNTER — PATIENT OUTREACH (OUTPATIENT)
Dept: INTERNAL MEDICINE CLINIC | Facility: CLINIC | Age: 59
End: 2024-05-28

## 2024-05-28 DIAGNOSIS — F41.1 GENERALIZED ANXIETY DISORDER WITH PANIC ATTACKS: ICD-10-CM

## 2024-05-28 DIAGNOSIS — M54.50 CHRONIC LOW BACK PAIN, UNSPECIFIED BACK PAIN LATERALITY, UNSPECIFIED WHETHER SCIATICA PRESENT: ICD-10-CM

## 2024-05-28 DIAGNOSIS — F41.0 GENERALIZED ANXIETY DISORDER WITH PANIC ATTACKS: ICD-10-CM

## 2024-05-28 DIAGNOSIS — G89.29 CHRONIC LOW BACK PAIN, UNSPECIFIED BACK PAIN LATERALITY, UNSPECIFIED WHETHER SCIATICA PRESENT: ICD-10-CM

## 2024-05-28 DIAGNOSIS — M79.7 FIBROMYALGIA: ICD-10-CM

## 2024-05-28 RX ORDER — QUETIAPINE FUMARATE 50 MG/1
200 TABLET, EXTENDED RELEASE ORAL
Qty: 360 TABLET | Refills: 0 | Status: SHIPPED | OUTPATIENT
Start: 2024-05-28 | End: 2024-08-26

## 2024-05-28 RX ORDER — HYDROXYZINE PAMOATE 50 MG/1
50 CAPSULE ORAL 3 TIMES DAILY
Qty: 270 CAPSULE | Refills: 0 | Status: SHIPPED | OUTPATIENT
Start: 2024-05-28 | End: 2024-08-26

## 2024-05-28 RX ORDER — SERTRALINE HYDROCHLORIDE 100 MG/1
TABLET, FILM COATED ORAL
Status: CANCELLED | OUTPATIENT
Start: 2024-05-28

## 2024-05-28 RX ORDER — TOPIRAMATE 100 MG/1
TABLET, FILM COATED ORAL
Status: CANCELLED | OUTPATIENT
Start: 2024-05-28

## 2024-05-28 RX ORDER — SERTRALINE HYDROCHLORIDE 100 MG/1
100 TABLET, FILM COATED ORAL DAILY
Qty: 90 TABLET | Refills: 0 | Status: SHIPPED | OUTPATIENT
Start: 2024-05-28 | End: 2024-08-26

## 2024-05-28 RX ORDER — BUSPIRONE HYDROCHLORIDE 15 MG/1
15 TABLET ORAL 3 TIMES DAILY
Qty: 90 TABLET | Refills: 2 | Status: SHIPPED | OUTPATIENT
Start: 2024-05-28 | End: 2024-08-26

## 2024-05-28 RX ORDER — MIRTAZAPINE 15 MG/1
TABLET, FILM COATED ORAL
Status: CANCELLED | OUTPATIENT
Start: 2024-05-28

## 2024-05-28 RX ORDER — QUETIAPINE FUMARATE 50 MG/1
50 TABLET, EXTENDED RELEASE ORAL
Status: CANCELLED | OUTPATIENT
Start: 2024-05-28

## 2024-05-28 RX ORDER — MIRTAZAPINE 15 MG/1
7.5 TABLET, FILM COATED ORAL
Qty: 30 TABLET | Refills: 0 | Status: SHIPPED | OUTPATIENT
Start: 2024-05-28

## 2024-05-28 RX ORDER — CLONAZEPAM 1 MG/1
1 TABLET ORAL 2 TIMES DAILY
Qty: 20 TABLET | Refills: 0 | Status: CANCELLED | OUTPATIENT
Start: 2024-05-28 | End: 2024-06-07

## 2024-05-28 RX ORDER — BUSPIRONE HYDROCHLORIDE 15 MG/1
15 TABLET ORAL 3 TIMES DAILY
Qty: 270 TABLET | Refills: 0 | Status: CANCELLED | OUTPATIENT
Start: 2024-05-28 | End: 2024-08-26

## 2024-05-28 RX ORDER — HYDROXYZINE PAMOATE 50 MG/1
50 CAPSULE ORAL 3 TIMES DAILY
Qty: 30 CAPSULE | Status: CANCELLED | OUTPATIENT
Start: 2024-05-28

## 2024-05-28 RX ORDER — TOPIRAMATE 100 MG/1
100 TABLET, FILM COATED ORAL DAILY
Qty: 90 TABLET | Refills: 0 | Status: SHIPPED | OUTPATIENT
Start: 2024-05-28 | End: 2024-08-26

## 2024-05-28 NOTE — TELEPHONE ENCOUNTER
I refilled the patient's medications except Klonopin as this was only supposed to be prescribed for a 10-day course when she was discharged from  psych 6 months ago. She definitely needs to establish with mental health so they can manage these psych meds.

## 2024-05-28 NOTE — PROGRESS NOTES
SW received a call from pt who had questions re her MH medications.  SW to ask the Clinical Team to call her back re same.  She shared she has spoke to [facility] and is schedule for her IN take appointments but she did not know when.     HIRAM has also assisted pt with a call to Carie @ [facility]  [phone] @ [address] [place] .   Who shares that pt's Admissions counselor Meeting is 6/25/24 @ 11:30 AM and her Psychiatrist appointment will be Wednesday 6/25/24 @ 10 AM .  All of these appointments will be Virtual.    She will get her therapist appointment after these appointment.    Pt was very happy to receive 2 canes the were delivered from [facility].      SW to remain available to assist as indicated.

## 2024-05-29 ENCOUNTER — TELEPHONE (OUTPATIENT)
Dept: INTERNAL MEDICINE CLINIC | Facility: CLINIC | Age: 59
End: 2024-05-29

## 2024-05-29 NOTE — TELEPHONE ENCOUNTER
Received multiple voice messages from patient requesting an update on her Klonopin order. Nurse discussed with patient the following medications were sent to her [facility] pharmacy in [place]:  Buspar, Seroquel, Zoloft, Vistaril, Topamax and Remeron.     Nurse reviewed with patient per last office visit (4/26/24), patient was prescribed a short term does of Lyrica 100 mg TID x 10 days and Robaxin 500 mg QID PRN x 2 weeks. Patient updated she was recommended to follow up with [facility].     Patient stated she has not heard from them. Nurse provided contact number for [facility] and encouraged patient to reach out directly. Patient then stated she spoke to them and waiting for someone to contact her from [place].     Patient's main concern is refill of her Klonopin. Patient stated she does not have an appointment with Behavioral Health until 6/25/24.     Patient expressing concern that she does not want to have a panic attack. Complaints of swelling and pain in her BLE.     Patient requesting refill of her Diclofenac gel.     Nurse expressed to patient that will f/u with physician. Stressed with patient the importance of following up with behavioral health team and [facility]. Patient also updated there are seven scripts waiting for  in pharmacy.     All questions/concerns answered at this time.

## 2024-05-30 ENCOUNTER — TELEPHONE (OUTPATIENT)
Dept: INTERNAL MEDICINE CLINIC | Facility: CLINIC | Age: 59
End: 2024-05-30

## 2024-05-30 ENCOUNTER — EVALUATION (OUTPATIENT)
Dept: PHYSICAL THERAPY | Facility: REHABILITATION | Age: 59
End: 2024-05-30
Payer: MEDICARE

## 2024-05-30 DIAGNOSIS — G89.29 CHRONIC LOW BACK PAIN, UNSPECIFIED BACK PAIN LATERALITY, UNSPECIFIED WHETHER SCIATICA PRESENT: Primary | ICD-10-CM

## 2024-05-30 DIAGNOSIS — M54.50 CHRONIC LOW BACK PAIN, UNSPECIFIED BACK PAIN LATERALITY, UNSPECIFIED WHETHER SCIATICA PRESENT: Primary | ICD-10-CM

## 2024-05-30 DIAGNOSIS — G89.4 CHRONIC PAIN DISORDER: ICD-10-CM

## 2024-05-30 PROCEDURE — 97110 THERAPEUTIC EXERCISES: CPT | Performed by: PHYSICAL THERAPIST

## 2024-05-30 PROCEDURE — 97162 PT EVAL MOD COMPLEX 30 MIN: CPT | Performed by: PHYSICAL THERAPIST

## 2024-05-30 NOTE — TELEPHONE ENCOUNTER
Received call from patient requesting refill of her diclofenac gel.     Script sent to pharmacy for refill.

## 2024-05-30 NOTE — PROGRESS NOTES
PT Evaluation     Today's date: 2024  Patient name: Samantha Rodriguez  : 1965  MRN: [id number]  Referring provider: Victorino Cardona PT  Dx:   Encounter Diagnosis     ICD-10-CM    1. Chronic low back pain, unspecified back pain laterality, unspecified whether sciatica present  M54.50 Ambulatory referral to PT spine    G89.29                      Assessment  Impairments: abnormal gait, abnormal or restricted ROM, activity intolerance, impaired balance, impaired physical strength, lacks appropriate home exercise program, pain with function, poor posture  and poor body mechanics    Assessment details: Patient presents with decreased lumbar ROM, decreased LE/core strength, postural deficits and decreased function secondary to chronic low back pain s/p multiple surgeries (fusion).  Patient would benefit from skilled PT intervention to address these issues and to maximize function.  Thank you for the referral.  Understanding of Dx/Px/POC: good     Prognosis: good    Goals  Short Term:  Pt will report decreased levels of pain by at least 2 subjective ratings in 4 weeks  Pt will demonstrate improved ROM by at least 25% in 4 weeks  Pt will demonstrate improved strength by 1/2 grade MMT in 4 weeks  Long Term:   Pt will be independent in their HEP in 8 weeks  Pt will demonstrate improved FOTO, > predicted level  Pt will be independent with all ADL's  Pt will perform household activities at prior/maximal level of function    Plan  Patient would benefit from: skilled physical therapy    Planned therapy interventions: abdominal trunk stabilization, balance, body mechanics training, manual therapy, neuromuscular re-education, patient/caregiver education, strengthening, stretching, therapeutic activities, therapeutic exercise, flexibility, graded exercise, transfer training and home exercise program    Frequency: 2x week  Duration in weeks: 8  Plan of Care beginning date: 2024  Plan of Care expiration date:  7/26/2024  Treatment plan discussed with: patient  Plan details: Patient was educated in HEP and Plan of Care.  All questions were answered to pt's satisfaction.        Subjective Evaluation    History of Present Illness  Mechanism of injury: Pt is a 58 y.o female with a c/o ongoing back pain for years.  Pt has hx of thoracolumbar fusion about 4 years ago.  Pt also has dorsal column spinal stimulator, but notes losing the remote and is trying to get another one.  Pt has not had any recent Physical Therapy and is now referred for PT at this time.  Pt reports pain/difficulty with ambulation (using quad cane currently), steps, bed mobility, supine to sit transfers, sit to stand transfers, dressing (pulling up pants), household activities (lives with son and son does most things), sleep.  Pt wears TLSO brace when she's up and has been wearing that for a few months.    Patient Goals  Patient goals for therapy: decreased pain, improved balance, independence with ADLs/IADLs and increased strength  Patient goal: stand up straighter  Pain  At best pain rating: 10  At worst pain rating: 10  Location: entire spine, upper thighs  Alleviating factors: none.    Treatments  Previous treatment: physical therapy    Objective     Concurrent Complaints  Negative for bladder dysfunction, bowel dysfunction and saddle (S4) numbness    Additional Special Questions  Pt denies unexplained weight loss.     Neurological Testing     Reflexes   Left   Patellar (L4): absent (0)  Achilles (S1): trace (1+)  Clonus sign: negative    Right   Patellar (L4): absent (0)  Achilles (S1): trace (1+)  Clonus sign: negative    Additional Neurological Details  Pt reports n/t in lumbar region and abdomen at site of incision.     Active Range of Motion     Additional Active Range of Motion Details  L/S AROM (% of normal):  Flex=50% with pain and stretch  Ext=10% with pain  RSB=50% with pain  LSB=50% with pain    Strength/Myotome Testing     Left Hip   Planes  of Motion   Flexion: 4-  Abduction: 3-  External rotation: 3+    Right Hip   Planes of Motion   Flexion: 4-  Abduction: 3  External rotation: 3+    Left Knee   Flexion: 4  Extension: 5    Right Knee   Flexion: 4  Extension: 4+    Left Ankle/Foot   Dorsiflexion: 5  Plantar flexion: 5 (seated)    Right Ankle/Foot   Dorsiflexion: 5  Plantar flexion: 5 (seated)    Tests     Lumbar     Left   Negative crossed SLR and passive SLR.     Right   Negative crossed SLR and passive SLR.     Left Pelvic Girdle/Sacrum   Positive: active SLR test.     Right Pelvic Girdle/Sacrum   Positive: active SLR test.     Additional Tests Details  (+)ALSR w/stabilization.  Pain with bed mobility on plinthe.   Mild decreased HS flexibility b/l.             Precautions: anxiety, OA, bi-polar, depression, fibromyalgia, HTN, obesity, PTSD, dorsal spinal cord stimulator, b/l TKA, seizure like activity, chronic LBP  POC expires Unit limit Auth Expiration date PT/OT + Visit Limit?   7/26 BOMN TBD BOMN         Visit/Unit Tracking  AUTH Status:  Date 5/30        TBD Used 1         Remaining             Pertinent Findings:                                                                                          Test / Measure  5/30/2024   FOTO (Predicted 47) 40                       Manuals 5/30                                                                Neuro Re-Ed             Standing pball crush             Seated TA w/LPD             Seated TA w/rows             Seated TA w/multifidus press                                                    Ther Ex             NS             Seated lumbar flexion w/pball             TA w/hip add squeeze             TA w/HL clamshell w/TB             TA w/bridges             TA w/SLR x 3 in standing b/l                          Pt education+ HEP instruction TP 8 mins            Ther Activity             TA w/alt marching in standing             Sit to stand             Side stepping             Gait Training                                        Modalities

## 2024-05-31 ENCOUNTER — TELEPHONE (OUTPATIENT)
Dept: INTERNAL MEDICINE CLINIC | Facility: CLINIC | Age: 59
End: 2024-05-31

## 2024-05-31 DIAGNOSIS — M79.7 FIBROMYALGIA: Primary | ICD-10-CM

## 2024-05-31 DIAGNOSIS — G89.4 CHRONIC PAIN DISORDER: ICD-10-CM

## 2024-05-31 RX ORDER — PREGABALIN 100 MG/1
100 CAPSULE ORAL 2 TIMES DAILY
Qty: 60 CAPSULE | Refills: 0 | Status: SHIPPED | OUTPATIENT
Start: 2024-05-31

## 2024-05-31 RX ORDER — CLONAZEPAM 1 MG/1
1 TABLET ORAL 2 TIMES DAILY
Qty: 20 TABLET | Refills: 0 | OUTPATIENT
Start: 2024-05-31 | End: 2024-06-10

## 2024-05-31 RX ORDER — PREGABALIN 100 MG/1
100 CAPSULE ORAL 3 TIMES DAILY
Qty: 30 CAPSULE | Refills: 0 | OUTPATIENT
Start: 2024-05-31 | End: 2024-06-10

## 2024-05-31 RX ORDER — METHOCARBAMOL 500 MG/1
500 TABLET, FILM COATED ORAL 4 TIMES DAILY
Qty: 56 TABLET | Refills: 0 | OUTPATIENT
Start: 2024-05-31 | End: 2024-06-14

## 2024-05-31 NOTE — TELEPHONE ENCOUNTER
I gave Ms. Rodriguez #60 of lyrica, to take twice per day as needed. She was suppose to follow with spine and make that appt which I see she is working on. If she cannot, we will begin to taper her lyrica down .     She also needs a follow up before Dr Valente leaves. If not possible, please schedule with new resident.

## 2024-05-31 NOTE — TELEPHONE ENCOUNTER
Spoke with patient on the phone. Introduced self and role. Patient updated script for Lyrica was sent to her pharmacy. Patient updated she is supposed to follow up with spine and make an appointment. Patient aware that her lyrica will be tapered down if not compliant with following up with spine.

## 2024-05-31 NOTE — TELEPHONE ENCOUNTER
Tried to call the patient to know more details about her klonopin but she did not  so I left voicemail.  Renewed Diclofenac gel

## 2024-05-31 NOTE — TELEPHONE ENCOUNTER
Spoke with patient on the phone. Introduced self and role. Patient updated Lyrica was sent to her pharmacy for refill. Patient aware to follow up with [facility] for an appointment. Patient updated per physician, Klonopin is not being refilled. Patient needs to follow up with her psych team regarding that medication. Patient aware of the importance of following up with both care teams. All questions/concerns answered at this time.     Patient stated she is currently staying at [address] [place] [address]. Patient's medications are being delivered to the home.

## 2024-05-31 NOTE — TELEPHONE ENCOUNTER
As mentioned in my note documented 5/28,  will not refill Klonopin as this was only supposed to be prescribed for a 10-day course when she was discharged from [facility] 6 months ago.

## 2024-06-03 RX ORDER — PREGABALIN 100 MG/1
CAPSULE ORAL
Qty: 30 CAPSULE | Refills: 0 | Status: SHIPPED | OUTPATIENT
Start: 2024-06-03

## 2024-06-06 ENCOUNTER — OFFICE VISIT (OUTPATIENT)
Dept: PHYSICAL THERAPY | Facility: REHABILITATION | Age: 59
End: 2024-06-06
Payer: MEDICARE

## 2024-06-06 DIAGNOSIS — M54.50 CHRONIC LOW BACK PAIN, UNSPECIFIED BACK PAIN LATERALITY, UNSPECIFIED WHETHER SCIATICA PRESENT: Primary | ICD-10-CM

## 2024-06-06 DIAGNOSIS — G89.29 CHRONIC LOW BACK PAIN, UNSPECIFIED BACK PAIN LATERALITY, UNSPECIFIED WHETHER SCIATICA PRESENT: Primary | ICD-10-CM

## 2024-06-06 PROCEDURE — 97110 THERAPEUTIC EXERCISES: CPT | Performed by: PHYSICAL THERAPIST

## 2024-06-06 NOTE — PROGRESS NOTES
Daily Note     Today's date: 2024  Patient name: Samantha Rodriguez  : 1965  MRN: [id number]  Referring provider: Victorino Cardona, SELVIN  Dx:   Encounter Diagnosis     ICD-10-CM    1. Chronic low back pain, unspecified back pain laterality, unspecified whether sciatica present  M54.50     G89.29               Pt arrived 15 mins late, but was accommodated.         Subjective: Pt reports difficulty with standing and ambulating, noting she feels shaky.  Pt reports falling yesterday trying to get up and answer the door.  Pt unsure if this is a side effect of being off 1 of her physiatric meds, as she can't get script filled until the end of the month when she sees her new Psychiatrist, as per pt.  Pt states her rollator is broken and is unable to get another one via insurance so she continues to use her quad cane.  Pt states she has not performed HEP yet.        Objective: See treatment diary below      Assessment: Tolerated treatment fair. Limited tolerance to standing exercises due to dizziness.  Pt unable to perform glute bridge secondary to weakness.  No pain complaints post session.  Monitor response NV.  Patient would benefit from continued PT      Plan: Continue per plan of care.  Progress treatment as tolerated.  Pt was encouraged to perform HEP daily.       Precautions: anxiety, OA, bi-polar, depression, fibromyalgia, HTN, obesity, PTSD, dorsal spinal cord stimulator, b/l TKA, seizure like activity, chronic LBP  POC expires Unit limit Auth Expiration date PT/OT + Visit Limit?    BOMN TBD BOMN         Visit/Unit Tracking  AUTH Status:  Date        8V - Used 1 2        Remaining   6          Pertinent Findings:                                                                                          Test / Measure  2024   FOTO (Predicted 47) 40                       Manuals                                                                Neuro Re-Ed             Standing  "pball crush             Seated TA w/LPD             Seated TA w/rows             Seated TA w/multifidus press                                                    Ther Ex             NS  L5x10'           Seated lumbar flexion w/pball  10\"x10           TA w/hip add squeeze  5\" x10           TA w/HL clamshell w/TB  Otb 3\"x10           TA w/bridges  Np-unable           Glue squeeze  5\"x10           TA w/SLR x 3 in standing b/l  Abd+ext x10 ea                        Pt education+ HEP instruction TP 8 mins            Ther Activity             TA w/alt marching in standing  X10 ea           Sit to stand             Side stepping             Gait Training                                       Modalities                                            "

## 2024-06-10 ENCOUNTER — OFFICE VISIT (OUTPATIENT)
Dept: PHYSICAL THERAPY | Facility: REHABILITATION | Age: 59
End: 2024-06-10
Payer: MEDICARE

## 2024-06-10 DIAGNOSIS — G89.29 CHRONIC LOW BACK PAIN, UNSPECIFIED BACK PAIN LATERALITY, UNSPECIFIED WHETHER SCIATICA PRESENT: Primary | ICD-10-CM

## 2024-06-10 DIAGNOSIS — M54.50 CHRONIC LOW BACK PAIN, UNSPECIFIED BACK PAIN LATERALITY, UNSPECIFIED WHETHER SCIATICA PRESENT: Primary | ICD-10-CM

## 2024-06-10 PROCEDURE — 97110 THERAPEUTIC EXERCISES: CPT | Performed by: PHYSICAL THERAPIST

## 2024-06-10 PROCEDURE — 97112 NEUROMUSCULAR REEDUCATION: CPT | Performed by: PHYSICAL THERAPIST

## 2024-06-10 NOTE — PROGRESS NOTES
"Daily Note     Today's date: 6/10/2024  Patient name: Samantha Rodriguez  : 1965  MRN: [id number]  Referring provider: Victorino Cardona, PT  Dx:   Encounter Diagnosis     ICD-10-CM    1. Chronic low back pain, unspecified back pain laterality, unspecified whether sciatica present  M54.50     G89.29                      Subjective: Pt offer no new complaints this session.        Objective: See treatment diary below      Assessment: Tolerated treatment well. VC's required for proper execution with exercises.  No pain complaints during or after session.  Patient would benefit from continued PT      Plan: Continue per plan of care.  Progress treatment as tolerated.       Precautions: anxiety, OA, bi-polar, depression, fibromyalgia, HTN, obesity, PTSD, dorsal spinal cord stimulator, b/l TKA, seizure like activity, chronic LBP  POC expires Unit limit Auth Expiration date PT/OT + Visit Limit?    BOMN TBD BOMN         Visit/Unit Tracking  AUTH Status:  Date 5/30 6/6 6/10      8V - Used 1 2 3       Remaining   6 5         Pertinent Findings:                                                                                          Test / Measure  2024   FOTO (Predicted 47) 40                       Manuals 5/30 6/6 6/10                                                              Neuro Re-Ed             Standing pball crush   5\"x10          Seated TA w/LPD   8# x10          Seated TA w/rows   9# x15          Seated TA w/multifidus press   7# 3\"x15                                                 Ther Ex             NS  L5x10' L5x10'          Seated lumbar flexion w/pball  10\"x10 10\"x10          TA w/hip add squeeze  5\" x10 5\"x10          TA w/HL clamshell w/TB  Otb 3\"x10 Otb 3\" x20          TA w/bridges  Np-unable           Glue squeeze  5\"x10 5\"x15          TA w/SLR x 3 in standing b/l  Abd+ext x10 ea nv                       Pt education+ HEP instruction TP 8 mins            Ther Activity           "   TA w/alt marching in standing  X10 ea nv          Sit to stand             Side stepping             Gait Training                                       Modalities

## 2024-06-12 ENCOUNTER — TELEPHONE (OUTPATIENT)
Dept: INTERNAL MEDICINE CLINIC | Facility: CLINIC | Age: 59
End: 2024-06-12

## 2024-06-12 NOTE — TELEPHONE ENCOUNTER
Patient wanted the Clinical team and Provider to know  since she hasn't had the medication Clonazepam (Klonopin)  Patient states she has been having severe withdrawals bad headaches,shaking of the hands,legs,and feet.barely able to walk.    On 6/07/24 to answer the door she stood up and took a step and fell forward (Patient states she must have lost her balance) she will be able to get the above medication on 6/26/24 through her Psychiatrist. But in the meantime she having the withdrawal symptoms.

## 2024-06-12 NOTE — TELEPHONE ENCOUNTER
Karli Chapman MA1 hour ago (2:44 PM)     PS  Patient wanted the Clinical team and Provider to know  since she hasn't had the medication Clonazepam (Klonopin)  Patient states she has been having severe withdrawals bad headaches,shaking of the hands,legs,and feet.barely able to walk.     On 6/07/24 to answer the door she stood up and took a step and fell forward (Patient states she must have lost her balance) she will be able to get the above medication on 6/26/24 through her Psychiatrist. But in the meantime she having the withdrawal symptoms.            Note

## 2024-06-13 ENCOUNTER — APPOINTMENT (OUTPATIENT)
Dept: PHYSICAL THERAPY | Facility: REHABILITATION | Age: 59
End: 2024-06-13
Payer: MEDICARE

## 2024-06-18 ENCOUNTER — TELEPHONE (OUTPATIENT)
Dept: NEUROSURGERY | Facility: CLINIC | Age: 59
End: 2024-06-18

## 2024-06-18 NOTE — TELEPHONE ENCOUNTER
Received call on nurseline from patient inquiring number to her [employer] rep, attempted to return call, no answer, left detailed voicemail with requested number.

## 2024-06-19 ENCOUNTER — TELEPHONE (OUTPATIENT)
Dept: INTERNAL MEDICINE CLINIC | Facility: CLINIC | Age: 59
End: 2024-06-19

## 2024-06-19 ENCOUNTER — PATIENT OUTREACH (OUTPATIENT)
Dept: INTERNAL MEDICINE CLINIC | Facility: CLINIC | Age: 59
End: 2024-06-19

## 2024-06-19 NOTE — PROGRESS NOTES
Outpatient Care Management Note:    Re:  [employer] contact number     Received message from patient requesting contact number for [employer] as she is trying to obtain a remote for her spinal cord stimulator.    I called patient today and no answer and left message that neurosurgery office reached out to her yesterday and left message with phone number (did not put phone # in their note). I did give her the main contact number for [employer] [phone] option [phone] and the contact number for the rep she had Loc at [phone]. I encourage follow up with Neurosurgery office should she need further info regarding this.     I did leave main number for PCP office [phone] if needed.     Not currently open to RN care management at this time. This is a one time outreach.

## 2024-06-19 NOTE — TELEPHONE ENCOUNTER
Patient call to request medication Ingrezza dosage to be increased from 40mg to 80mg.Patient spoke to the Ingrezza team which advised patient to call PCP and  request an increase of her Ingrezza.    Patient states since her Tardive Dyskinesia symptoms are not improving on the 40mg she wants to go see about taking the 80mg    If Clinical speaks with patient please let her know when script was sent in for her Incontinence supplies.

## 2024-06-20 ENCOUNTER — OFFICE VISIT (OUTPATIENT)
Dept: PHYSICAL THERAPY | Facility: REHABILITATION | Age: 59
End: 2024-06-20
Payer: MEDICARE

## 2024-06-20 DIAGNOSIS — M54.50 CHRONIC LOW BACK PAIN, UNSPECIFIED BACK PAIN LATERALITY, UNSPECIFIED WHETHER SCIATICA PRESENT: Primary | ICD-10-CM

## 2024-06-20 DIAGNOSIS — G89.29 CHRONIC LOW BACK PAIN, UNSPECIFIED BACK PAIN LATERALITY, UNSPECIFIED WHETHER SCIATICA PRESENT: Primary | ICD-10-CM

## 2024-06-20 PROCEDURE — 97112 NEUROMUSCULAR REEDUCATION: CPT | Performed by: PHYSICAL THERAPIST

## 2024-06-20 PROCEDURE — 97530 THERAPEUTIC ACTIVITIES: CPT | Performed by: PHYSICAL THERAPIST

## 2024-06-20 PROCEDURE — 97110 THERAPEUTIC EXERCISES: CPT | Performed by: PHYSICAL THERAPIST

## 2024-06-20 NOTE — PROGRESS NOTES
"Daily Note     Today's date: 2024  Patient name: Samantha Rodriguez  : 1965  MRN: [id number]  Referring provider: Victorino Cardona PT  Dx:   Encounter Diagnosis     ICD-10-CM    1. Chronic low back pain, unspecified back pain laterality, unspecified whether sciatica present  M54.50     G89.29               1on1 w/-805 and w/-827.       Subjective: Pt expresses frustration with persistent pain symptoms and difficulty getting around.  Pt feels some of her symptoms are related to her tardive dyskinesia and states her Doctors have not explained things well to her.        Objective: See treatment diary below      Assessment: Tolerated treatment well. Improved ambulation with us of rollator this session and improved tolerance to all exercises compared to prior sessions.  No increase in pain complaints during or after session.  Patient would benefit from continued PT      Plan: Continue per plan of care.  Progress treatment as tolerated.  Pt encouraged to contact her PCP to discuss all her medical concerns.       Precautions: anxiety, OA, bi-polar, depression, fibromyalgia, HTN, obesity, PTSD, dorsal spinal cord stimulator, b/l TKA, seizure like activity, chronic LBP  POC expires Unit limit Auth Expiration date PT/OT + Visit Limit?    BOMN TBD BOMN         Visit/Unit Tracking  AUTH Status:  Date 5/30 6/6 6/10 6/20     8V - Used 1 2 3 4      Remaining   6 5 4        Pertinent Findings:                                                                                          Test / Measure  2024   FOTO (Predicted 47) 40                       Manuals 5/30 6/6 6/10 6/20                                                             Neuro Re-Ed             Standing pball crush   5\"x10 np         Seated TA w/LPD   8# x10 8#x15         Seated TA w/rows   9# x15 10#x115         Seated TA w/multifidus press   7# 3\"x15 7# 3\"x15 ea                                                Ther Ex          " "   NS  L5x10' L5x10' L5x10'         Seated lumbar flexion w/pball  10\"x10 10\"x10 10\"x10         TA w/hip add squeeze  5\" x10 5\"x10 5\"x15         TA w/HL clamshell w/TB  Otb 3\"x10 Otb 3\" x20 Gtb 3\" 2x10         TA w/bridges  Np-unable           Glue squeeze  5\"x10 5\"x15 5\"x15         TA w/SLR x 3 in standing b/l  Abd+ext x10 ea nv Abd+ext x10 ea                      Pt education+ HEP instruction TP 8 mins            Ther Activity             TA w/alt marching in standing  X10 ea nv X10 ea         Sit to stand    W/foam x10         Side stepping    2 laps at window         Gait Training                                       Modalities                                                "

## 2024-06-21 NOTE — TELEPHONE ENCOUNTER
Ingrezza dose adjustments would need to be addressed by neurology who patient already follows with. Please reach out to neurology office regarding her request.

## 2024-06-24 ENCOUNTER — TELEPHONE (OUTPATIENT)
Dept: INTERNAL MEDICINE CLINIC | Facility: CLINIC | Age: 59
End: 2024-06-24

## 2024-06-24 NOTE — TELEPHONE ENCOUNTER
Folder Color- Red    Name of Form- Prescriber Response     Form to be filled out by- Dr. Valente     Form to be Faxed [phone]    Patient made aware of 10 business day policy.

## 2024-06-25 NOTE — TELEPHONE ENCOUNTER
Called and spoke to patient, patient made aware as per note. Patient understood and had no questions at this time.

## 2024-06-26 ENCOUNTER — PATIENT OUTREACH (OUTPATIENT)
Dept: INTERNAL MEDICINE CLINIC | Facility: CLINIC | Age: 59
End: 2024-06-26

## 2024-06-26 ENCOUNTER — TELEPHONE (OUTPATIENT)
Age: 59
End: 2024-06-26

## 2024-06-26 ENCOUNTER — TELEPHONE (OUTPATIENT)
Dept: INTERNAL MEDICINE CLINIC | Facility: CLINIC | Age: 59
End: 2024-06-26

## 2024-06-26 DIAGNOSIS — M54.16 LUMBAR RADICULOPATHY: Primary | ICD-10-CM

## 2024-06-26 NOTE — PROGRESS NOTES
Pt has called SW and she shares that her dgt is in the hospital after major surgery and she does not want to Sw to contact her re pt's affairs.  Pt shares that she is upset re her her not able to manage her Financial Affairs.    She had a rep payee when she was at [facility].    She feels she was mistreated and they mis-managed her finances.    Pt shares she is not getting the proper amount in her SW check.      SW reviewed that her PCP did send in an Letter to  Office that you can handle your affaisrs.  SW did offerd to help her all the [facility] Office to clarify her sitiuation.  Pt agreed to same.    SW also attempted to clarify if pt is active with [facility]. Their services are Virtual and she shares their application is on her dgt's I Phone. She also wants to get it on he phone.  It was difficult to follow pt but she indicated she has an appointment with them today.  Pt shares she feesl she does not need ansy hgelp.  SW has suggested that pt ask for an ICM from [facility] as it can help her navigate these type issues.  Pt appears intersdted.  Pt aslo is concerned re her symptoms of tardive dyskinesia. SW encouraged her to dicuss withher psychiatriat and PCP.    Sw has reviewed that since pt  is unsteady on her feet and has memory problems at time ( Pt s/p CVA and back issues etc)  and at times seems to need reinforcement to coordinate her care it is good to have her family involved.  SW had also reviewed this is why we had helped her to get in the [place] in the past.  She again shares that she does NOT feel she needs the [place] Program at this time.  Sw to return call later this date to attempt to call the [facility] Office to confirm she is getting her proper benefits .     SW attempted to return call to pt this afternoon but the call went to .  SW left message for pt to return  call.

## 2024-06-26 NOTE — TELEPHONE ENCOUNTER
Patient called in to request a referral to see an ortho specialist. Patient said she's been dealing with spinal pain for long term. She said she tried PT and a [facility]. Patient said she thinks it time to be seen by ortho.

## 2024-06-28 ENCOUNTER — TELEPHONE (OUTPATIENT)
Dept: INTERNAL MEDICINE CLINIC | Facility: CLINIC | Age: 59
End: 2024-06-28

## 2024-06-28 ENCOUNTER — OFFICE VISIT (OUTPATIENT)
Dept: NEUROLOGY | Facility: CLINIC | Age: 59
End: 2024-06-28
Payer: MEDICARE

## 2024-06-28 VITALS
HEIGHT: 61 IN | TEMPERATURE: 97.8 F | HEART RATE: 64 BPM | DIASTOLIC BLOOD PRESSURE: 90 MMHG | SYSTOLIC BLOOD PRESSURE: 140 MMHG | WEIGHT: 185 LBS | BODY MASS INDEX: 34.93 KG/M2 | OXYGEN SATURATION: 100 %

## 2024-06-28 DIAGNOSIS — F41.0 PANIC ATTACKS: ICD-10-CM

## 2024-06-28 DIAGNOSIS — G89.29 CHRONIC PAIN: ICD-10-CM

## 2024-06-28 DIAGNOSIS — Z86.73 HISTORY OF CVA (CEREBROVASCULAR ACCIDENT): Primary | ICD-10-CM

## 2024-06-28 DIAGNOSIS — M25.50 PAIN IN JOINT, MULTIPLE SITES: ICD-10-CM

## 2024-06-28 DIAGNOSIS — G24.01 TARDIVE DYSKINESIA: ICD-10-CM

## 2024-06-28 DIAGNOSIS — G31.84 MILD COGNITIVE IMPAIRMENT: ICD-10-CM

## 2024-06-28 PROCEDURE — 99213 OFFICE O/P EST LOW 20 MIN: CPT | Performed by: NURSE PRACTITIONER

## 2024-06-28 RX ORDER — VALBENAZINE 60 MG/1
1 CAPSULE ORAL DAILY
Qty: 90 CAPSULE | Refills: 1 | Status: SHIPPED | OUTPATIENT
Start: 2024-06-28

## 2024-06-28 RX ORDER — ASPIRIN 81 MG/1
81 TABLET ORAL DAILY
Qty: 30 TABLET | Refills: 11 | Status: SHIPPED | OUTPATIENT
Start: 2024-06-28

## 2024-06-28 NOTE — PATIENT INSTRUCTIONS
Restart aspirin   Discuss use of klonopin with psychiatry  Can increase the ingrezza to 60 but please get a repeat EKG after on it for 2 weeks  Follow up in 6 months or sooner if needed.

## 2024-06-28 NOTE — PROGRESS NOTES
Ambulatory Visit  Name: Samantha Rodriguez      : 1965      MRN: [id number]  Encounter Provider: MERCY Moore  Encounter Date: 2024   Encounter department: [facility] [place]    Assessment & Plan   1. History of CVA (cerebrovascular accident)  -     aspirin (Aspirin Low Dose) 81 mg EC tablet; Take 1 tablet (81 mg total) by mouth daily  -     ECG 12 lead; Future  2. Tardive dyskinesia  -     Valbenazine Tosylate (Ingrezza) 60 MG CAPS; Take 1 capsule by mouth in the morning  -     ECG 12 lead; Future  3. Panic attacks  4. Pain in joint, multiple sites  5. Chronic pain  6. Mild cognitive impairment    Patient Instructions:  Restart aspirin   Discuss use of klonopin with psychiatry  Can increase the ingrezza to 60 but please get a repeat EKG after on it for 2 weeks  Follow up in 6 months or sooner if needed.    History of Present Illness     Samantha Rodriguez is a 58 y.o. female who presents for follow up, last office visit 7/10/2023. She was admitted -2023 with numbness/stroke like symptoms; CT/CTA were normal and her symptoms resolved and the risk for stroke was felt to be low-more likely functional/anxiety related. Since that time she did have admissions to  unit in September and 2023 and states she did go to a personal care home from November to 2024 but didn't like it so now she is back living with family. She arrives alone today. She states her Tardive dyskinesia symptoms are worse and requests higher dose of ingrezza (previously lowered b/c of high Qtc- last qtc was 448 which was much improved). She does still complain of memory loss- MOCA today is  compared to  from before. She states her anxiety was under better control when she was in the facility on a higher dose of klonopin and requests this today. She states for unclear reasons she has stopped her aspirin. She does complain of continued chronic pain; her lyrica is now 100mg TID.  She is hard to understand at times; this is chronic. She denies recent seizure like activity.     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  migraine   she does have family history of seizures.     EEG 48 hour 3/23/2023:  Interpretation:   This is an abnormal day 2 of 2 days (24 hours without video) continuous ambulatory EEG due to excessive diffuse theta activity in the waking background.  This finding is etiologically nonspecific for mild diffuse cerebral dysfunction, which may in part be due to psychotropic medications.  There are no epileptiform discharges over the course of more than 47 hours ambulatory EEG    Review of Systems   Constitutional: Negative.    HENT: Negative.     Eyes: Negative.    Respiratory: Negative.     Cardiovascular: Negative.    Gastrointestinal: Negative.    Endocrine: Negative.    Genitourinary: Negative.    Musculoskeletal:  Positive for gait problem (rollator).   Skin: Negative.    Allergic/Immunologic: Negative.    Neurological:  Positive for dizziness, tremors (whole body), seizures (approx 1 year ago), weakness, light-headedness, numbness and headaches.   Hematological: Negative.    Psychiatric/Behavioral: Negative.     Vision - hard time seeing - blurry  Unstable on feet - fell a few months ago  ROS was reviewed and updated as appropriate      Current Outpatient Medications on File Prior to Visit   Medication Sig Dispense Refill    acetaminophen (TYLENOL) 500 mg tablet Take 1 tablet (500 mg total) by mouth every 8  (eight) hours as needed for mild pain 180 tablet 1    aluminum-magnesium hydroxide 200-200 MG/5ML suspension Take 5 mL by mouth every 6 (six) hours as needed for heartburn 355 mL 0    amLODIPine (NORVASC) 5 mg tablet Take 1 tablet (5 mg total) by mouth daily 30 tablet 2    atorvastatin (LIPITOR) 40 mg tablet Take 1 tablet (40 mg total) by mouth daily 30 tablet 3    busPIRone (BUSPAR) 15 mg tablet Take 1 tablet (15 mg total) by mouth 3 (three) times a day 90 tablet 2    dicyclomine (BENTYL) 10 mg capsule TAKE 1 CAPSULE BY MOUTH 4 TIMES A DAY (BEFORE MEALS AND AT BEDTIME) 120 capsule 0    famotidine (PEPCID) 20 mg tablet Take 2 tablets (40 mg total) by mouth daily at bedtime 60 tablet 3    hydrOXYzine pamoate (VISTARIL) 50 mg capsule Take 1 capsule (50 mg total) by mouth 3 (three) times a day 270 capsule 0    linaCLOtide (Linzess) 290 MCG CAPS Take 1 capsule by mouth daily before breakfast 30 capsule 3    loratadine (CLARITIN) 10 mg tablet Take 1 tablet (10 mg total) by mouth daily 30 tablet 2    melatonin 3 mg Take 2 tablets (6 mg total) by mouth daily at bedtime 60 tablet 0    mirtazapine (REMERON) 15 mg tablet Take 0.5 tablets (7.5 mg total) by mouth daily at bedtime 30 tablet 0    QUEtiapine (SEROquel XR) 50 mg Take 4 tablets (200 mg total) by mouth daily at bedtime (Patient taking differently: Take 50 mg by mouth daily at bedtime Only taking 2, 50 mg a day) 360 tablet 0    sertraline (ZOLOFT) 100 mg tablet Take 1 tablet (100 mg total) by mouth daily 90 tablet 0    topiramate (TOPAMAX) 100 mg tablet Take 1 tablet (100 mg total) by mouth daily 90 tablet 0    clonazePAM (KlonoPIN) 1 mg tablet Take 1 tablet (1 mg total) by mouth 2 (two) times a day for 10 days 20 tablet 0    methocarbamol (ROBAXIN) 500 mg tablet Take 1 tablet (500 mg total) by mouth 4 (four) times a day for 14 days 56 tablet 0    polyethylene glycol (GOLYTELY) 4000 mL solution Take as instructed on bowel preparation instruction 4000 mL 0     No  "current facility-administered medications on file prior to visit.      Social History     Tobacco Use    Smoking status: Never    Smokeless tobacco: Never   Vaping Use    Vaping status: Never Used   Substance and Sexual Activity    Alcohol use: Not Currently     Comment: 2 x year; being a social drinker as per all scripts     Drug use: No    Sexual activity: Not Currently     Objective     /90 (BP Location: Right arm, Patient Position: Sitting, Cuff Size: Standard)   Pulse 64   Temp 97.8 °F (36.6 °C) (Temporal)   Ht 5' 1\" (1.549 m)   Wt 83.9 kg (185 lb)   SpO2 100%   BMI 34.96 kg/m²     Physical Exam  Vitals reviewed.   Constitutional:       General: She is not in acute distress.     Appearance: She is obese.   HENT:      Head: Normocephalic and atraumatic.      Right Ear: External ear normal.      Left Ear: External ear normal.      Mouth/Throat:      Pharynx: Oropharynx is clear.   Eyes:      Extraocular Movements: Extraocular movements intact.      Pupils: Pupils are equal, round, and reactive to light.   Cardiovascular:      Rate and Rhythm: Normal rate.      Pulses: Normal pulses.      Heart sounds: Normal heart sounds.   Pulmonary:      Effort: Pulmonary effort is normal.      Breath sounds: Normal breath sounds.   Abdominal:      General: There is no distension.      Tenderness: There is no abdominal tenderness.   Musculoskeletal:         General: Tenderness present.      Cervical back: Tenderness present.   Skin:     General: Skin is warm and dry.      Capillary Refill: Capillary refill takes less than 2 seconds.      Findings: No rash.   Neurological:      Mental Status: She is alert. Mental status is at baseline.      Motor: Weakness present.      Deep Tendon Reflexes: Reflexes normal.      Comments: Abnormal facial movements  4+/5 Left sided weakness-no change  Antalgic gait with walker     Psychiatric:         Mood and Affect: Mood is anxious.         Behavior: Behavior is cooperative.        "  Thought Content: Thought content does not include homicidal or suicidal ideation.         Cognition and Memory: Memory is impaired.         Judgment: Judgment is not impulsive.       Neurologic Exam     Cranial Nerves     CN III, IV, VI   Pupils are equal, round, and reactive to light.

## 2024-06-28 NOTE — TELEPHONE ENCOUNTER
Folder Color: Red     Name of Form: [facility]      Form to be filled out by: Dr Valente     Form to be Faxed: [phone]     Patient made aware of 10 day business policy.

## 2024-07-02 ENCOUNTER — TELEPHONE (OUTPATIENT)
Age: 59
End: 2024-07-02

## 2024-07-02 ENCOUNTER — TELEPHONE (OUTPATIENT)
Dept: INTERNAL MEDICINE CLINIC | Facility: CLINIC | Age: 59
End: 2024-07-02

## 2024-07-02 ENCOUNTER — PATIENT OUTREACH (OUTPATIENT)
Dept: INTERNAL MEDICINE CLINIC | Facility: CLINIC | Age: 59
End: 2024-07-02

## 2024-07-02 DIAGNOSIS — M79.7 FIBROMYALGIA: ICD-10-CM

## 2024-07-02 NOTE — TELEPHONE ENCOUNTER
Abby called in about the preauth for pts medication    Valbenazine Tosylate (Ingrezza) 60 MG CAPS    The prior auth is running out and she wanted to keep on top of it.     She faxed over the papers to us or can be done by [email or web address]  Keycode [id number]    Her call back number is [phone]

## 2024-07-02 NOTE — TELEPHONE ENCOUNTER
Patient called to let us know about her appt with Ortho 7/16/24.Patient request for Provider to read Neurology.    Patient is also requesting to retry the medication Tramadol but with increase dosage from 50 mg to next dose up.

## 2024-07-02 NOTE — PROGRESS NOTES
SW received a returned call from pt who shares that she has gone to the Social Security Office yesterday and she has gotten it cleared up that she now does not need a Rep Payee and she is manging her own affairs.    Pt is pleased re same.    She further relates that she has had conversations with her current psychiatrist  [facility]   Pt shares that she feels she needs more clondine and her neurologist is in agreement.  SW has advised her to discuss same with her medical providers.    Pt is intersted in finding a new OP MH provider and her neurologist agrees.  SW has advised her that there are limited Medicare MH Providers and she should definitely connect with a New Provider for discontinuing her current provider.  Pt agrees to same.    SW has suggested calling the Customer service # to find a new OP MH Provider.  SW will send pt another list of OP MH Providers via the mail to her   Sw also provided pt the contact info for [facility]  [phone] X [address] [phone],  [facility]  [phone] and [facility] [phone]. ( SW had already IN BASKETED them 5/1/24 to add pt to their list).     SW suggested that perhaps her children can help call abut she did not feel she needed their assistance re same.     Pt has previously denied need for the [place].  She is currently connected to OP MH and has agreed to not discontinue until she finds a new provider.    Patient does not have any further questions, concerns, or other needs at this time.  Patient has my contact # and PCP office # if needed.  Social Work to remain available to assist as indicated.    Please re-consult Social Work if needed.

## 2024-07-02 NOTE — TELEPHONE ENCOUNTER
Received call from patient. Patient expressed she went to the psychiatrist. Psychiatrist will not prescribe Klonopin. Patient wants to look for a new psychiatrist. SW aware.     Per physician note on 5/31-PCP will not refill Klonopin as this was only supposed to be prescribed for a 10 day course when discharged from  psych 6 months ago    Patient also requesting refill of her Lyrica. Per physician note on 5/31-patient to follow with spine and make appointment. If she cannot, we will begin to taper her lyrica down. Current dose 100 mg by mouth BID    Patient contacting office for refill of Tramadol with a higher dose. Reviewed PDMP. Last filled on 1/29/24. Per recent neurology visit on 6/28/24, patient recommended to stop taking Tramadol.     Please review and advise.

## 2024-07-03 RX ORDER — PREGABALIN 100 MG/1
100 CAPSULE ORAL DAILY
Qty: 30 CAPSULE | Refills: 0 | Status: SHIPPED | OUTPATIENT
Start: 2024-07-03

## 2024-07-03 NOTE — TELEPHONE ENCOUNTER
Please review and advise. Last note from Dr. Parker-  JUAN gave Ms. Rodriguez #60 of lyrica, to take twice per day as needed. She was suppose to follow with spine and make that appt which I see she is working on. If she cannot, we will begin to taper her lyrica down .      She also needs a follow up before Dr Valente leaves. If not possible, please schedule with new resident.         PDMP reviewed. Last filled 5/31/24. Due for refill 6/30/24

## 2024-07-03 NOTE — TELEPHONE ENCOUNTER
Patient called back to check status. I explained to patient that she would need to be seen in office.     Patient was scheduled with Dr. Montoya for 7/5 at 10am.

## 2024-07-03 NOTE — TELEPHONE ENCOUNTER
Thanks Garima.    I agree that she should follow-up with spine specialists as you outlined, she should otherwise follow-up with us in the office so that we can work on tapering her Lyrica.    As far as prescribing Klonopin and Tramadol, we would not be comfortable prescribing this without seeing her in the office, particularly given multiple specialists declined to prescribe these in the recent past.    If she has persistent symptoms and concerns, she should make a follow-up appointment with us to go over her medications.

## 2024-07-05 ENCOUNTER — TELEPHONE (OUTPATIENT)
Dept: INTERNAL MEDICINE CLINIC | Facility: CLINIC | Age: 59
End: 2024-07-05

## 2024-07-05 NOTE — TELEPHONE ENCOUNTER
Gricel [facility] - PHONE # [phone] EXT [phone]. Gricel Called in and stated that she spoke to the Patients plan on how to submit the following medication for Prior Auth.     Valbenazine Tosylate (Ingrezza) 60 MG CAPS [[id number]]    Order Details  Dose: 1 capsule Route: Oral Frequency: Daily   Dispense Quantity: 90 capsule Refills: 1      She said to have the medication submitted to [employer] Key CODE:  [id number]    Their Phone # [phone]  She stated that the PA was faxed to our Main # on July 2nd.   Please call Gricel at the number above if any you have any further questions.   Thank you.

## 2024-07-05 NOTE — TELEPHONE ENCOUNTER
PA approved from through 7/5/2025.    Pharmacy notified. Spoke with Jeimy on [employer] line. Transferred to Ingrezza line. Spoke with Margoth.    Informed her of approval. Asking for letter to be faxed to them at [phone].     She informs that pt received medication on 7/2/2024. Next date to be filled is in August.     Awaiting approval letter.

## 2024-07-08 NOTE — TELEPHONE ENCOUNTER
1st attempt- Called patient, patient did not answer left a voicemail to call back.    Clerical- please reach out to patient and schedule a follow up

## 2024-07-08 NOTE — TELEPHONE ENCOUNTER
Still awaiting approval letter. Once received, Maria Esther would like for us to fax it to them. Awaiting approval letter.

## 2024-07-09 NOTE — TELEPHONE ENCOUNTER
2nd attempt-Called patient and left detailed voice message. Patient needs an appointment with Dr. Garcia to discuss medications.     Please follow up if able to reach patient for a follow up appointment with Dr. Garcia regarding medication management.

## 2024-07-11 ENCOUNTER — TELEPHONE (OUTPATIENT)
Dept: NEUROLOGY | Facility: CLINIC | Age: 59
End: 2024-07-11

## 2024-07-11 NOTE — TELEPHONE ENCOUNTER
[facility]za.

## 2024-07-16 ENCOUNTER — OFFICE VISIT (OUTPATIENT)
Dept: OBGYN CLINIC | Facility: CLINIC | Age: 59
End: 2024-07-16
Payer: MEDICARE

## 2024-07-16 ENCOUNTER — OFFICE VISIT (OUTPATIENT)
Dept: INTERNAL MEDICINE CLINIC | Facility: CLINIC | Age: 59
End: 2024-07-16

## 2024-07-16 VITALS
WEIGHT: 196.7 LBS | BODY MASS INDEX: 37.17 KG/M2 | HEART RATE: 88 BPM | OXYGEN SATURATION: 98 % | TEMPERATURE: 98.6 F | SYSTOLIC BLOOD PRESSURE: 142 MMHG | DIASTOLIC BLOOD PRESSURE: 95 MMHG

## 2024-07-16 VITALS
DIASTOLIC BLOOD PRESSURE: 68 MMHG | WEIGHT: 185 LBS | HEIGHT: 61 IN | BODY MASS INDEX: 34.93 KG/M2 | SYSTOLIC BLOOD PRESSURE: 122 MMHG

## 2024-07-16 DIAGNOSIS — M54.16 LUMBAR RADICULOPATHY: ICD-10-CM

## 2024-07-16 DIAGNOSIS — Z98.890 HISTORY OF LUMBAR SURGERY: ICD-10-CM

## 2024-07-16 DIAGNOSIS — M54.50 CHRONIC LOW BACK PAIN, UNSPECIFIED BACK PAIN LATERALITY, UNSPECIFIED WHETHER SCIATICA PRESENT: Primary | ICD-10-CM

## 2024-07-16 DIAGNOSIS — R26.2 AMBULATORY DYSFUNCTION: ICD-10-CM

## 2024-07-16 DIAGNOSIS — G89.29 CHRONIC LOW BACK PAIN, UNSPECIFIED BACK PAIN LATERALITY, UNSPECIFIED WHETHER SCIATICA PRESENT: Primary | ICD-10-CM

## 2024-07-16 DIAGNOSIS — M54.50 LOW BACK PAIN WITHOUT SCIATICA, UNSPECIFIED BACK PAIN LATERALITY, UNSPECIFIED CHRONICITY: Primary | ICD-10-CM

## 2024-07-16 PROCEDURE — 99214 OFFICE O/P EST MOD 30 MIN: CPT | Performed by: ORTHOPAEDIC SURGERY

## 2024-07-16 PROCEDURE — 3080F DIAST BP >= 90 MM HG: CPT | Performed by: INTERNAL MEDICINE

## 2024-07-16 PROCEDURE — 3077F SYST BP >= 140 MM HG: CPT | Performed by: INTERNAL MEDICINE

## 2024-07-16 PROCEDURE — 99213 OFFICE O/P EST LOW 20 MIN: CPT | Performed by: INTERNAL MEDICINE

## 2024-07-16 PROCEDURE — G2211 COMPLEX E/M VISIT ADD ON: HCPCS | Performed by: INTERNAL MEDICINE

## 2024-07-16 RX ORDER — DICLOFENAC SODIUM 30 MG/G
1 GEL TOPICAL 2 TIMES DAILY
Qty: 100 G | Refills: 5 | Status: SHIPPED | OUTPATIENT
Start: 2024-07-16 | End: 2024-07-25 | Stop reason: SDUPTHER

## 2024-07-16 NOTE — PROGRESS NOTES
[facility] ORTHOPEDIC SPINE SURGERY  DR.AMIR LITA MD  [address] [address] [phone]    HISTORY OF PRESENT ILLNESS:    Samantha Rodriguez is a 58 y.o. female who presents for initial evaluation of lumbar spine. The patient has attended physical therapy in the form aqua therapy, comprehensive spine and in patient physical therapy without improved symptoms. The patient had surgery 5 years ago with Dr. Ramses Sarabia, orthopedic spine surgeon, [facility] in [place], [place]. Surgery was performed for severe scoliosis. She reports she has been worse since surgery and walks leaning forward ever since. She is in constant pain. She cannot turn over in bed. She has been treated by Pain Management. The patient has pain in the lower back, into the buttocks, into the groin, to the knees, to the calves, into her toes. She lives with her son. The patient is not a diabetic. The patient is not a smoker. The patient had a stroke that effected her left side in 2010. The patient ambulates with a rolling walker.        ALLERGIES: No Known Allergies    MEDICATIONS:    Current Outpatient Medications:     acetaminophen (TYLENOL) 500 mg tablet, Take 1 tablet (500 mg total) by mouth every 8 (eight) hours as needed for mild pain, Disp: 180 tablet, Rfl: 1    aluminum-magnesium hydroxide 200-200 MG/5ML suspension, Take 5 mL by mouth every 6 (six) hours as needed for heartburn, Disp: 355 mL, Rfl: 0    amLODIPine (NORVASC) 5 mg tablet, Take 1 tablet (5 mg total) by mouth daily, Disp: 30 tablet, Rfl: 2    aspirin (Aspirin Low Dose) 81 mg EC tablet, Take 1 tablet (81 mg total) by mouth daily, Disp: 30 tablet, Rfl: 11    atorvastatin (LIPITOR) 40 mg tablet, Take 1 tablet (40 mg total) by mouth daily, Disp: 30 tablet, Rfl: 3    busPIRone (BUSPAR) 15 mg tablet, Take 1 tablet (15 mg total) by mouth 3 (three) times a day, Disp: 90 tablet, Rfl: 2    Diclofenac Sodium (VOLTAREN) 1 %, Apply 2 g topically 4  (four) times a day as needed (pain), Disp: 150 g, Rfl: 3    dicyclomine (BENTYL) 10 mg capsule, TAKE 1 CAPSULE BY MOUTH 4 TIMES A DAY (BEFORE MEALS AND AT BEDTIME), Disp: 120 capsule, Rfl: 0    famotidine (PEPCID) 20 mg tablet, Take 2 tablets (40 mg total) by mouth daily at bedtime, Disp: 60 tablet, Rfl: 3    hydrOXYzine pamoate (VISTARIL) 50 mg capsule, Take 1 capsule (50 mg total) by mouth 3 (three) times a day, Disp: 270 capsule, Rfl: 0    linaCLOtide (Linzess) 290 MCG CAPS, Take 1 capsule by mouth daily before breakfast, Disp: 30 capsule, Rfl: 3    loratadine (CLARITIN) 10 mg tablet, Take 1 tablet (10 mg total) by mouth daily, Disp: 30 tablet, Rfl: 2    melatonin 3 mg, Take 2 tablets (6 mg total) by mouth daily at bedtime, Disp: 60 tablet, Rfl: 0    mirtazapine (REMERON) 15 mg tablet, Take 0.5 tablets (7.5 mg total) by mouth daily at bedtime, Disp: 30 tablet, Rfl: 0    polyethylene glycol (GOLYTELY) 4000 mL solution, Take as instructed on bowel preparation instruction, Disp: 4000 mL, Rfl: 0    pregabalin (LYRICA) 100 mg capsule, Take 1 capsule (100 mg total) by mouth daily, Disp: 30 capsule, Rfl: 0    QUEtiapine (SEROquel XR) 50 mg, Take 4 tablets (200 mg total) by mouth daily at bedtime (Patient taking differently: Take 50 mg by mouth daily at bedtime Only taking 2, 50 mg a day), Disp: 360 tablet, Rfl: 0    sertraline (ZOLOFT) 100 mg tablet, Take 1 tablet (100 mg total) by mouth daily, Disp: 90 tablet, Rfl: 0    topiramate (TOPAMAX) 100 mg tablet, Take 1 tablet (100 mg total) by mouth daily, Disp: 90 tablet, Rfl: 0    Valbenazine Tosylate (Ingrezza) 60 MG CAPS, Take 1 capsule by mouth in the morning, Disp: 90 capsule, Rfl: 1    chlorhexidine (PERIDEX) 0.12 % solution, Apply 15 mL to the mouth or throat 2 (two) times a day (Patient not taking: Reported on 6/28/2024), Disp: 120 mL, Rfl: 0    cholecalciferol (VITAMIN D3) 1,000 units tablet, Take 1 tablet (1,000 Units total) by mouth daily Do not start before  December 10, 2023. (Patient not taking: Reported on 6/28/2024), Disp: 30 tablet, Rfl: 0    clonazePAM (KlonoPIN) 1 mg tablet, Take 1 tablet (1 mg total) by mouth 2 (two) times a day for 10 days, Disp: 20 tablet, Rfl: 0    methocarbamol (ROBAXIN) 500 mg tablet, Take 1 tablet (500 mg total) by mouth 4 (four) times a day for 14 days, Disp: 56 tablet, Rfl: 0    pantoprazole (PROTONIX) 40 mg tablet, Take 1 tablet (40 mg total) by mouth 2 (two) times a day before meals (Patient not taking: Reported on 6/28/2024), Disp: 60 tablet, Rfl: 2    polyethylene glycol (GLYCOLAX) 17 GM/SCOOP powder, Take 17 g by mouth daily (Patient not taking: Reported on 6/28/2024), Disp: 510 g, Rfl: 2    polyethylene glycol (GOLYTELY) 4000 mL solution, Take as instructed on bowel preparation instructions (Patient not taking: Reported on 6/28/2024), Disp: 4000 mL, Rfl: 0    QUEtiapine (SEROquel XR) 150 mg 24 hr tablet, , Disp: , Rfl:      PAST MEDICAL HISTORY:   Past Medical History:   Diagnosis Date    Anxiety     Arthritis     left knee    Arthritis of right knee 10/6/2020    At risk for falls     Bipolar 2 disorder (HCC)     FOLLOWS WITH PSYCHIATRIST. CONTINUE LAMOTRIGINE; RESOLVED: [id number]    Depression     Familial tremor     both hands    Fibromyalgia     LAST ASSESSED: 08DEC2017    GERD (gastroesophageal reflux disease)     Hearing aid worn     left ear    [place] (hard of hearing)     left ear    Hyperlipidemia     Hypertension     Left-sided weakness     Lumbar disc disease with radiculopathy 2/2/2018    Memory loss of unknown cause     long and short term    Migraine     Multiple closed fractures of metatarsal bone of right foot 5/2/2021    Obesity     Obesity, Class II, BMI 35-39.9     Osteoarthritis of both hips 10/31/2016    Osteoarthritis of knee 2/20/2013    Description: Continue Tylenol and Naproxen. Encourage exercise and weight loss. Patient refused physical therapy. I will refer the patient back to Orthopedics.    Overactive  bladder     Panic attack     Patellofemoral disorder of both knees 2020    Post traumatic stress disorder     Primary localized osteoarthritis of both knees 2017    Primary osteoarthritis of both knees 2020    S/P insertion of spinal cord stimulator     no remote    S/P total knee arthroplasty, right 3/10/2022    Sacroiliitis (HCC) 2017    Seasonal allergies     Seizure-like activity (HCC) 6/3/2022    Seizures (HCC)     possible seizure like activity    Small bowel obstruction (HCC) 3/24/2023    Status post total knee replacement, left 2022    Stroke ([facility])     questionable stroke     Tardive dyskinesia     PATIENT STATES    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  CONT ASA 81 MG DAILY; RESOLVED: [id number]    Urinary incontinence     Uses walker     Wears dentures     partial lower / full upper- doesnt wear    Wears glasses        PAST SURGICAL HISTORY:  Past Surgical History:   Procedure Laterality Date    BACK SURGERY       SECTION      COLONOSCOPY      RESOLVED: 2016    EAR SURGERY      EGD      HYSTERECTOMY      MYRINGOTOMY W/ TUBES Left     NECK SURGERY  2019    VT ARTHRP KNE CONDYLE&PLATU MEDIAL&LAT COMPARTMENTS Right 3/9/2022    Procedure: ARTHROPLASTY KNEE TOTAL;  Surgeon: Chandni Tuttle DO;  Location: [facility];  Service: Orthopedics    VT ARTHRP KNE CONDYLE&PLATU MEDIAL&LAT COMPARTMENTS Left 2022    Procedure: ARTHROPLASTY KNEE TOTAL;  Surgeon: Chandni Tuttle DO;  [facility]: [facility];  Service: Orthopedics    VT CYSTOURETHROSCOPY N/A 2016    Procedure: CYSTOSCOPY, BOTOX INJECTION;  Surgeon: Abraham Teague MD;  Location: [facility];  Service: Gynecology    VT INSJ/RPLCMT SPINAL NPG/RCVR POCKET CRTJ&CONNJ Right 2/10/2021    Procedure: REPLACEMENT IMPLANTABLE PULSE GENERATOR DORSAL SPINAL COLUMN STIMULATOR, RIGHT;  Surgeon: Carlos Alberto Jacome MD;  [facility]: [facility];  Service: Neurosurgery    VT PRQ IMPLTJ  NSTIM ELECTRODE ARRAY EPIDURAL Right 7/28/2020    Procedure: INSERTION THORACIC DORSAL COLUMN SPINAL CORD STIMULATOR PERCUTANEOUS W IMPLANTABLE PULSE GENERATOR, RIGHT;  Surgeon: Carlos Alberto Jacome MD;  Location:  MAIN OR;  Service: Neurosurgery    TONSILLECTOMY      TUBAL LIGATION  1986    UPPER GASTROINTESTINAL ENDOSCOPY  09/2020       SOCIAL HISTORY:  Social History     Tobacco Use   Smoking Status Never   Smokeless Tobacco Never          PHYSICAL EXAM:  58 y.o. female sitting comfortably on exam chair in no apparent distress.   Patient ambulates without normal gait, leaning forward, shuffling gait.   TTP over bilateral greater trochanteric bursa    Sensation decreased to light touch left L2 through S1 dermatomes.   Sensation decreased to light touch right L2 through S1 dermatomes     Left Motor: 3/5 iliopsoas, 3/5 quadriceps, 3/5 tibialis anterior, 3/5 extensor hallucis longus, and 3/5 gastrocsoleus.   Right Motor: 3/5 iliopsoas, 3/5 quadriceps, 3/5 tibialis anterior, 3/5 extensor hallucis longus, and 3/5 gastrocsoleus.     ROM:  Pain free ROM of bilateral hips     Straight leg raise test is negative Bilateral   The patient is well perfused distally.        RADIOGRAPHIC STUDIES:  X-ray, bilateral hips, 4/5/2023: 2 view normal x-rays of the pelvis and hips.  No evidence of significant SI joint abnormality.  Instrumentation down to the level of S1.  CT, abdomen pelvis, 5/25/2023: Evidence of prior extensive fusion from T8-S1 on the left and T9-S1 on the right side.  There is evidence of prior spinal cord placement at the T8-9 level.  There is also evidence of pseudoarthrosis at the upper part of the construct, T8-9 and possibly T9-10.  Upper thoracic spine were not visualized on the CT scan.  There is evidence of anterior interbody fusion at L2-3, L3-4, L4-5 and L5-S1.  There is clear evidence of pseudoarthrosis at L5-S1 both anteriorly and posteriorly.  Bilateral S1 screws are loose.  There is evidence of subsidence  anteriorly with screw loosening and significant neuroforaminal stenosis.    ASSESSMENT:  1. Low back pain without sciatica, unspecified back pain laterality, unspecified chronicity  2. Lumbar radiculopathy  -     Ambulatory Referral to Orthopedic Surgery  -     Ambulatory Referral to Physical Therapy; Future  3. Ambulatory dysfunction  -     Ambulatory Referral to Physical Therapy; Future  4. History of lumbar surgery  -     Ambulatory Referral to Physical Therapy; Future  -     XR entire spine (scoliosis) 4-5 vw; Future; Expected date: 07/16/2024      PLAN:  58 y.o. female with low back pain, lumbar radiculopathy, history of thoracolumbar surgery and ambulatory dysfunction. The patient has a spinal cord stimulator.  She does have a number of other medical issues including history of stroke.  She has significant amatory dysfunction flatback deformity and leans forward approximately 30 degrees when ambulating.    I did review the CT scan and x-rays of the pelvis.  There is clear evidence of pseudoarthrosis at the superior aspect of the instrumentation addition to L5-S1 level.  The screws at S1 are loose the anterior implant has subsided.  There is no effusion seen either anteriorly or posteriorly.      The best plan of care for the patient is to attend aqua therapy. The patient should attend physical therapy about once per week. Although the patient has attend physical therapy previously, that was many years ago. The patient is encouraged to attend as she is deconditioned in general and recovering from bilateral total knee arthroplasties. The patient was provided a list of locations to attend. She is encouraged to attend at [facility] for best care.     She does need additional x-rays.  I did order x-rays of the lumbar spine which will include flexion-extension x-rays time of next visit.  Also ordered scoliosis films to look at her global sagittal balance and alignment.    I will see the patient back in 6 weeks  for re-evaluation. 4 view lumbar spine x-rays upon arrival at the patient's follow-up visit.          Scribe Attestation      I,:  Denise Corea am acting as a scribe while in the presence of the attending physician.:       I,:  Yenni Stevens MD personally performed the services described in this documentation    as scribed in my presence.:

## 2024-07-17 ENCOUNTER — NURSE TRIAGE (OUTPATIENT)
Age: 59
End: 2024-07-17

## 2024-07-17 DIAGNOSIS — K21.9 GERD WITHOUT ESOPHAGITIS: ICD-10-CM

## 2024-07-17 RX ORDER — PANTOPRAZOLE SODIUM 40 MG/1
40 TABLET, DELAYED RELEASE ORAL
Qty: 60 TABLET | Refills: 2 | Status: SHIPPED | OUTPATIENT
Start: 2024-07-17

## 2024-07-17 NOTE — PROGRESS NOTES
Ambulatory Visit  Name: Samantha Rodriguez      : 1965      MRN: [id number]  Encounter Provider: Yfn Ruelas DO  Encounter Date: 2024   Encounter department: [facility]    Assessment & Plan   1. Chronic low back pain, unspecified back pain laterality, unspecified whether sciatica present  Patient has a history of chronic pain and opiate dependence  Patient is status post thoracolumbar decompression and fusion in  in [place] with subsequent appendectomy status post thoracic spinal cord stimulator placement in   Patient was on Lyrica 100 mg 3 times daily, Voltaren gel, tramadol 50 mg every 6 hours  Patient was weaned off tramadol and we started to wean off Lyrica  Patient was referred to comprehensive spine program and pain medicine and orthopedic surgery  Neither specialist recommended controlled substance for pain control  Patient is coming today asking for tramadol and increase Lyrica dosage  Patient was advised that we cannot prescribe these medications again because it is not recommended by her specialist  Patient was seen in the same day with orthopedic surgery for the same reason which they recommended physical therapy in the form of aqua therapy  -     Diclofenac Sodium (SOLARAZE) 3 % GEL; Apply 1 Application topically 2 (two) times a day  -     Ambulatory referral to Spine & Pain Management; Future       History of Present Illness     HPI  58-year-old female patient with past medical history of osteoarthritis, chronic pain syndrome, opioid dependence, tardive dyskinesia, hypertension, hyperlipidemia, CVA, chronic back pain status post thoracolumbar decompression and fusion  [place] and subsequent dynamic 20 syndrome status post thoracic spinal cord stimulator placement in  who came into the clinic today complaining of severe back pain that is not controlled by the current regimen.  Patient was seen by orthopedic surgery the same day in the  morning who recommended physical therapy and aqua therapy.  We were able to wean off Lyrica anxious currently on 100 mg however she requesting increase Lyrica dosage and starting back on tramadol.  We discussed with the patient that we are not able as specialist does not recommend restarting pain medication and recommended continue physical therapy due to deconditioning.  Review of Systems   Constitutional:  Negative for activity change, appetite change, diaphoresis, fatigue, fever and unexpected weight change.   HENT:  Negative for congestion, dental problem, drooling, ear pain, facial swelling, nosebleeds, rhinorrhea, sore throat, tinnitus and trouble swallowing.    Eyes:  Negative for pain and visual disturbance.   Respiratory:  Negative for apnea, cough, chest tightness, shortness of breath and wheezing.    Cardiovascular:  Negative for chest pain and leg swelling.   Gastrointestinal:  Negative for abdominal distention, abdominal pain, blood in stool and nausea.   Endocrine: Negative for heat intolerance, polydipsia and polyuria.   Genitourinary:  Negative for decreased urine volume, difficulty urinating, dyspareunia, dysuria, enuresis, flank pain, genital sores and vaginal pain.   Musculoskeletal:  Positive for back pain and gait problem. Negative for arthralgias, joint swelling, myalgias, neck pain and neck stiffness.   Skin:  Negative for color change and pallor.   Neurological:  Negative for tremors, seizures, syncope, facial asymmetry, numbness and headaches.   Psychiatric/Behavioral:  Negative for agitation, decreased concentration, dysphoric mood and suicidal ideas. The patient is not nervous/anxious and is not hyperactive.          /95 (BP Location: Left arm, Patient Position: Sitting, Cuff Size: Large)   Pulse 88   Temp 98.6 °F (37 °C) (Temporal)   Wt 89.2 kg (196 lb 11.2 oz)   SpO2 98%   BMI 37.17 kg/m²     Physical Exam  Constitutional:       General: She is not in acute distress.      Appearance: She is not ill-appearing, toxic-appearing or diaphoretic.   HENT:      Head: Normocephalic and atraumatic.      Nose: Nose normal. No rhinorrhea.   Cardiovascular:      Rate and Rhythm: Normal rate.      Pulses: Normal pulses.      Heart sounds: No murmur heard.     No friction rub. No gallop.   Pulmonary:      Effort: Pulmonary effort is normal. No respiratory distress.      Breath sounds: No stridor. No wheezing or rhonchi.   Abdominal:      General: Abdomen is flat. There is no distension.      Palpations: There is no mass.      Tenderness: There is no abdominal tenderness. There is no guarding or rebound.      Hernia: No hernia is present.   Musculoskeletal:         General: No swelling, tenderness, deformity or signs of injury. Normal range of motion.      Right lower leg: No edema.      Left lower leg: No edema.   Skin:     General: Skin is warm.      Coloration: Skin is not jaundiced or pale.      Findings: No bruising, erythema, lesion or rash.   Neurological:      General: No focal deficit present.      Mental Status: She is alert.      Cranial Nerves: No cranial nerve deficit.      Sensory: No sensory deficit.      Motor: No weakness.   Psychiatric:         Mood and Affect: Mood normal.         Behavior: Behavior normal.         Thought Content: Thought content normal.         Judgment: Judgment normal.         Yfn Ruelas DO PGY3  Internal Medicine Resident   [facility]

## 2024-07-17 NOTE — TELEPHONE ENCOUNTER
Regarding: pain,bloating,numbness and pain in abdomen and esophagus  ----- Message from Radha RAMIREZ sent at 7/17/2024  2:42 PM EDT -----  Pt calling with stomach pain ,bloating, swelling and numbness and pain in the abdomen and esophagus. Please reach out to the pt to discuss

## 2024-07-17 NOTE — TELEPHONE ENCOUNTER
"LOV 2/20/24 Dr. Jaiyeola (GERD, constipation, abd pain), Procedure combo 3/20/24     Patient called in with complaints of moderate-severe abdominal pain, bloating, weight gain but no appetite. I advised ED visit but she declines at this time.     I reviewed medications and she continues with Pepcid 40 mg at bedtime, dicyclomine 10 mg QID and her Linzess 290 mcg (does not take on daily basis due to some incontinence at times). She has not been on pantoprazole for months since she was in personal care facility earlier this year.    I advised we can reorder medication, [facility] confirmed. When entered pantoprazole comes up as not covered by her plan may require prior authorization.     I asked her to go to call clears for next 24 hours and then start bland food diet. I scheduled next urgent visit w/NLyudmila Mcallister 7/23/24 and reiterated to report to ED for severe pain.    Please review/advise if any further recommendations. Please sign off on PPI order.    Reason for Disposition   SEVERE abdominal pain (e.g., excruciating)    Answer Assessment - Initial Assessment Questions  1. LOCATION: \"Where does it hurt?\"       Upper and lower   2. RADIATION: \"Does the pain shoot anywhere else?\" (e.g., chest, back)      Goes around sides and hips and lower back, buttocks, swelling hips/thighs  3. ONSET: \"When did the pain begin?\" (e.g., minutes, hours or days ago)       Worsening symptoms last month  4. SUDDEN: \"Gradual or sudden onset?\"      Gradually worsening  5. PATTERN \"Does the pain come and go, or is it constant?\"     - If constant: \"Is it getting better, staying the same, or worsening?\"       (Note: Constant means the pain never goes away completely; most serious pain is constant and it progresses)      - If intermittent: \"How long does it last?\" \"Do you have pain now?\"      (Note: Intermittent means the pain goes away completely between bouts)      constant  6. SEVERITY: \"How bad is the pain?\"  (e.g., Scale 1-10; mild, moderate, or " "severe)    - MILD (1-3): doesn't interfere with normal activities, abdomen soft and not tender to touch     - MODERATE (4-7): interferes with normal activities or awakens from sleep, tender to touch     - SEVERE (8-10): excruciating pain, doubled over, unable to do any normal activities       Moderate to severe  7. RECURRENT SYMPTOM: \"Have you ever had this type of stomach pain before?\" If Yes, ask: \"When was the last time?\" and \"What happened that time?\"       denies  8. CAUSE: \"What do you think is causing the stomach pain?\"      unknown  9. RELIEVING/AGGRAVATING FACTORS: \"What makes it better or worse?\" (e.g., movement, antacids, bowel movement)      Nothing improving symptoms  10. OTHER SYMPTOMS: \"Has there been any vomiting, diarrhea, constipation, or urine problems?\"        Bloating  11. PREGNANCY: \"Is there any chance you are pregnant?\" \"When was your last menstrual period?\"        N/A    Protocols used: Abdominal Pain - Female-ADULT-[place]    "

## 2024-07-22 NOTE — TELEPHONE ENCOUNTER
[facility] Spoke with Cierra. Medicare member, transferred to that line. Spoke with Jose Alberto. She faxed approval letter to  location.

## 2024-07-23 DIAGNOSIS — G89.4 CHRONIC PAIN DISORDER: ICD-10-CM

## 2024-07-23 NOTE — TELEPHONE ENCOUNTER
Received call from patient requesting refill of her Diclofenac 1% gel.     Script sent to pharmacy for refill.

## 2024-07-24 DIAGNOSIS — G89.29 CHRONIC LOW BACK PAIN, UNSPECIFIED BACK PAIN LATERALITY, UNSPECIFIED WHETHER SCIATICA PRESENT: ICD-10-CM

## 2024-07-24 DIAGNOSIS — M54.50 CHRONIC LOW BACK PAIN, UNSPECIFIED BACK PAIN LATERALITY, UNSPECIFIED WHETHER SCIATICA PRESENT: ICD-10-CM

## 2024-07-25 ENCOUNTER — TELEPHONE (OUTPATIENT)
Dept: INTERNAL MEDICINE CLINIC | Facility: CLINIC | Age: 59
End: 2024-07-25

## 2024-07-25 DIAGNOSIS — M54.50 CHRONIC LOW BACK PAIN, UNSPECIFIED BACK PAIN LATERALITY, UNSPECIFIED WHETHER SCIATICA PRESENT: ICD-10-CM

## 2024-07-25 DIAGNOSIS — G89.29 CHRONIC LOW BACK PAIN, UNSPECIFIED BACK PAIN LATERALITY, UNSPECIFIED WHETHER SCIATICA PRESENT: ICD-10-CM

## 2024-07-25 RX ORDER — DICLOFENAC SODIUM 30 MG/G
1 GEL TOPICAL 2 TIMES DAILY
Qty: 100 G | Refills: 5 | Status: SHIPPED | OUTPATIENT
Start: 2024-07-25

## 2024-07-25 RX ORDER — METHOCARBAMOL 500 MG/1
500 TABLET, FILM COATED ORAL 4 TIMES DAILY
Qty: 56 TABLET | Refills: 0 | OUTPATIENT
Start: 2024-07-25 | End: 2024-08-08

## 2024-07-25 NOTE — TELEPHONE ENCOUNTER
Received call from patient requesting an update on her Diclofenac 3%.     Contacted patient's pharmacy at . Pharmacy did not receive script for Diclofenac Sodium (Solaraze) 3%.     Script resent and will complete prior authorization if needed.

## 2024-07-25 NOTE — TELEPHONE ENCOUNTER
Patient has been leaving multiple vms about getting her refill about her methocarbamol and Diclofenac Sodium. After speaking to Garima she explained that the methocarbamol was refused and has been refused for a while now since it was a short term medication. I explained that to the patient and she was upset about the ordeal. I sympathized with the patient's pain and emphasized her getting into contact with pain and spine management. She said she was still awaiting a call from them. After some time of venting to me about what she was going through, she said she was receiving a call from another line with this number. We hung up.

## 2024-07-30 ENCOUNTER — TELEPHONE (OUTPATIENT)
Age: 59
End: 2024-07-30

## 2024-07-30 NOTE — TELEPHONE ENCOUNTER
PT CALLED ASKING FOR ARIANNE'S, FROM [employer], PHONE NUMBER.    CONFIRMED PHONE NUMBER. PT WAS APPRECIATIVE.    ARIANNE - [phone]

## 2024-08-06 ENCOUNTER — TELEPHONE (OUTPATIENT)
Age: 59
End: 2024-08-06

## 2024-08-06 ENCOUNTER — PATIENT OUTREACH (OUTPATIENT)
Dept: INTERNAL MEDICINE CLINIC | Facility: CLINIC | Age: 59
End: 2024-08-06

## 2024-08-06 NOTE — TELEPHONE ENCOUNTER
"Behavioral Health Outpatient Intake Questions    Referred By   : PCP    Please advise interviewee that they need to answer all questions truthfully to allow for best care, and any misrepresentations of information may affect their ability to be seen at this clinic   => Was this discussed? Yes       Behavioral Health Outpatient Intake History -     Presenting Problem (in patient's own words):   Panic Attacks, Depression, Bi Polar, Social Anxiety    Are there any communication barriers for this patient?     No                                                   Are you taking any psychiatric medications? Yes   Hydroxzine, Buspar, priprimate, remron - PCP    Has the Patient previously received outpatient Talk Therapy or Medication Management from [facility]  No       Has the Patient abused alcohol or other substances in the last 6 months ? No      Legal History-     Is this treatment court ordered? No       Has the Patient been convicted of a felony?  NO      ACCEPTED as a patient yes  If \"Yes\" Appointment Date: 11/12 with Dr. Canseco        Name of Insurance Co:[employer] [facility]  Insurance ID# [id number]  Insurance Phone #   If ins is primary or secondary? Primary      Pt. Not able to use [employer] or Email for NP  - per pt.  "

## 2024-08-06 NOTE — PROGRESS NOTES
SW returned call to pt who had left a message updating her new # [phone].    The chart has already been updated with this #.    In addition, Sw did provide pt the # for [facility] [phone] as per her request.    In addition , SW did share it looked like [facility] was trying to reach her to see if she wanted to remain on the  waiting lists.    SW provide pt there  # as well. [phone].    Patient does not have any further questions, concerns, or other needs at this time.  Patient has my contact # and PCP office # if needed.  Social Work to remain available to assist as indicated.    Please re-consult Social Work if needed.

## 2024-08-07 ENCOUNTER — TELEPHONE (OUTPATIENT)
Dept: INTERNAL MEDICINE CLINIC | Facility: CLINIC | Age: 59
End: 2024-08-07

## 2024-08-07 NOTE — TELEPHONE ENCOUNTER
Patient called back because she said that she spoke with her insurance company. She said that she was told by insurance that if that dx is changed to actinic keratosis that the 3% gel would be covered. She said that she had a skin condition in the past

## 2024-08-08 NOTE — TELEPHONE ENCOUNTER
Attempted to reach patient at . Left voice message with patient and office callback number.     Patient requesting diagnosis be changed to approve her Diclofenac 3% Gel to actinic keratosis. Patient stating she has that condition. Reviewed chart, patient has no documentation of diagnosis noted.

## 2024-08-13 ENCOUNTER — TELEPHONE (OUTPATIENT)
Dept: PSYCHIATRY | Facility: CLINIC | Age: 59
End: 2024-08-13

## 2024-08-13 NOTE — TELEPHONE ENCOUNTER
One week follow up call for New Patient appointment with Arron Canseco on 11/12/24  was made on 8/13/24. Writer informed patient of New Patient paperwork needing to be completed 5 days prior to the appointment. Writer confirmed paperwork has been sent via mail.    Appointment was made on: 8/6/24

## 2024-08-15 DIAGNOSIS — G89.4 CHRONIC PAIN DISORDER: ICD-10-CM

## 2024-08-15 DIAGNOSIS — G89.29 CHRONIC LOW BACK PAIN, UNSPECIFIED BACK PAIN LATERALITY, UNSPECIFIED WHETHER SCIATICA PRESENT: ICD-10-CM

## 2024-08-15 DIAGNOSIS — M54.50 CHRONIC LOW BACK PAIN, UNSPECIFIED BACK PAIN LATERALITY, UNSPECIFIED WHETHER SCIATICA PRESENT: ICD-10-CM

## 2024-08-15 RX ORDER — DICLOFENAC SODIUM 30 MG/G
1 GEL TOPICAL 2 TIMES DAILY
Qty: 100 G | Refills: 5 | Status: SHIPPED | OUTPATIENT
Start: 2024-08-15

## 2024-08-16 DIAGNOSIS — R10.9 ABDOMINAL PAIN, UNSPECIFIED ABDOMINAL LOCATION: ICD-10-CM

## 2024-08-19 RX ORDER — DICYCLOMINE HYDROCHLORIDE 10 MG/1
10 CAPSULE ORAL
Qty: 120 CAPSULE | Refills: 2 | Status: SHIPPED | OUTPATIENT
Start: 2024-08-19

## 2024-08-26 ENCOUNTER — OFFICE VISIT (OUTPATIENT)
Dept: OBGYN CLINIC | Facility: CLINIC | Age: 59
End: 2024-08-26
Payer: MEDICARE

## 2024-08-26 VITALS
SYSTOLIC BLOOD PRESSURE: 120 MMHG | DIASTOLIC BLOOD PRESSURE: 82 MMHG | HEIGHT: 61 IN | WEIGHT: 194 LBS | BODY MASS INDEX: 36.63 KG/M2

## 2024-08-26 DIAGNOSIS — M54.50 LOW BACK PAIN WITHOUT SCIATICA, UNSPECIFIED BACK PAIN LATERALITY, UNSPECIFIED CHRONICITY: ICD-10-CM

## 2024-08-26 DIAGNOSIS — M54.16 LUMBAR RADICULOPATHY: ICD-10-CM

## 2024-08-26 DIAGNOSIS — M54.50 LUMBAR SPINE PAIN: Primary | ICD-10-CM

## 2024-08-26 DIAGNOSIS — Z98.890 HISTORY OF LUMBAR SURGERY: ICD-10-CM

## 2024-08-26 DIAGNOSIS — R26.2 AMBULATORY DYSFUNCTION: ICD-10-CM

## 2024-08-26 PROCEDURE — 99213 OFFICE O/P EST LOW 20 MIN: CPT | Performed by: ORTHOPAEDIC SURGERY

## 2024-08-26 RX ORDER — CLONAZEPAM 0.5 MG/1
0.5 TABLET ORAL DAILY PRN
COMMUNITY
Start: 2024-08-21

## 2024-08-26 RX ORDER — QUETIAPINE FUMARATE 50 MG/1
50 TABLET, FILM COATED ORAL 2 TIMES DAILY
COMMUNITY
Start: 2024-08-20

## 2024-08-26 NOTE — PROGRESS NOTES
[facility] ORTHOPEDIC SPINE SURGERY  DR.AMIR LITA MD  [address] [address]  [phone]    HISTORY OF PRESENT ILLNESS:    Samantha Rodriguez is a 58 y.o. female who presents for follow-up of lumbar spine. The patient was last seen in the office on 7/16/2024 where the patient was referred to aquatic therapy, and entire spine x-rays were ordered. Unfortunately the patient did not go for her entire spine x-rays. The patient goes to her evaluation for aqua therapy tomorrow. She had to wait to begin this as she will be living with her daughter during the day time and her daughter recently moved. The patient continues to have pain.  The patient has a history of stroke in 2010. The patient had surgery 5 years ago with Dr. Ramses Sarabia, orthopedic spine surgeon, [facility] in [place], [place]. Surgery was performed for severe scoliosis. The patient has a spinal cord stimulator placed by Dr. Jacome in 2021. The patient reports pain in her legs is worse than pain in her arms and lumbar spine. The patient is also vitamin D deficient that is untreated.       ALLERGIES: No Known Allergies    MEDICATIONS:    Current Outpatient Medications:     acetaminophen (TYLENOL) 500 mg tablet, Take 1 tablet (500 mg total) by mouth every 8 (eight) hours as needed for mild pain, Disp: 180 tablet, Rfl: 1    aluminum-magnesium hydroxide 200-200 MG/5ML suspension, Take 5 mL by mouth every 6 (six) hours as needed for heartburn, Disp: 355 mL, Rfl: 0    aspirin (Aspirin Low Dose) 81 mg EC tablet, Take 1 tablet (81 mg total) by mouth daily, Disp: 30 tablet, Rfl: 11    atorvastatin (LIPITOR) 40 mg tablet, Take 1 tablet (40 mg total) by mouth daily, Disp: 30 tablet, Rfl: 3    busPIRone (BUSPAR) 15 mg tablet, Take 1 tablet (15 mg total) by mouth 3 (three) times a day, Disp: 90 tablet, Rfl: 2    clonazePAM (KlonoPIN) 0.5 mg tablet, Take 0.5 mg by mouth daily as needed, Disp: , Rfl:     Diclofenac Sodium  (SOLARAZE) 3 % GEL, Apply 1 Application topically 2 (two) times a day, Disp: 100 g, Rfl: 5    Diclofenac Sodium (VOLTAREN) 1 %, Apply 2 g topically 4 (four) times a day as needed (pain), Disp: 150 g, Rfl: 3    dicyclomine (BENTYL) 10 mg capsule, Take 1 capsule (10 mg total) by mouth 4 (four) times a day (before meals and at bedtime), Disp: 120 capsule, Rfl: 2    famotidine (PEPCID) 20 mg tablet, Take 2 tablets (40 mg total) by mouth daily at bedtime, Disp: 60 tablet, Rfl: 3    hydrOXYzine pamoate (VISTARIL) 50 mg capsule, Take 1 capsule (50 mg total) by mouth 3 (three) times a day, Disp: 270 capsule, Rfl: 0    linaCLOtide (Linzess) 290 MCG CAPS, Take 1 capsule by mouth daily before breakfast, Disp: 30 capsule, Rfl: 3    melatonin 3 mg, Take 2 tablets (6 mg total) by mouth daily at bedtime, Disp: 60 tablet, Rfl: 0    mirtazapine (REMERON) 15 mg tablet, Take 0.5 tablets (7.5 mg total) by mouth daily at bedtime, Disp: 30 tablet, Rfl: 0    pantoprazole (PROTONIX) 40 mg tablet, Take 1 tablet (40 mg total) by mouth 2 (two) times a day before meals, Disp: 60 tablet, Rfl: 2    polyethylene glycol (GOLYTELY) 4000 mL solution, Take as instructed on bowel preparation instruction, Disp: 4000 mL, Rfl: 0    QUEtiapine (SEROquel XR) 50 mg, Take 4 tablets (200 mg total) by mouth daily at bedtime (Patient taking differently: Take 50 mg by mouth daily at bedtime Only taking 2, 50 mg a day), Disp: 360 tablet, Rfl: 0    QUEtiapine (SEROquel) 50 mg tablet, Take 50 mg by mouth 2 (two) times a day, Disp: , Rfl:     sertraline (ZOLOFT) 100 mg tablet, Take 1 tablet (100 mg total) by mouth daily, Disp: 90 tablet, Rfl: 0    topiramate (TOPAMAX) 100 mg tablet, Take 1 tablet (100 mg total) by mouth daily, Disp: 90 tablet, Rfl: 0    Valbenazine Tosylate (Ingrezza) 60 MG CAPS, Take 1 capsule by mouth in the morning, Disp: 90 capsule, Rfl: 1    amLODIPine (NORVASC) 5 mg tablet, Take 1 tablet (5 mg total) by mouth daily, Disp: 30 tablet, Rfl: 2     clonazePAM (KlonoPIN) 1 mg tablet, Take 1 tablet (1 mg total) by mouth 2 (two) times a day for 10 days, Disp: 20 tablet, Rfl: 0    loratadine (CLARITIN) 10 mg tablet, Take 1 tablet (10 mg total) by mouth daily, Disp: 30 tablet, Rfl: 2    methocarbamol (ROBAXIN) 500 mg tablet, Take 1 tablet (500 mg total) by mouth 4 (four) times a day for 14 days, Disp: 56 tablet, Rfl: 0    pregabalin (LYRICA) 100 mg capsule, Take 1 capsule (100 mg total) by mouth daily (Patient not taking: Reported on 8/26/2024), Disp: 30 capsule, Rfl: 0     PAST MEDICAL HISTORY:   Past Medical History:   Diagnosis Date    Anxiety     Arthritis     left knee    Arthritis of right knee 10/6/2020    At risk for falls     Bipolar 2 disorder (HCC)     FOLLOWS WITH PSYCHIATRIST. CONTINUE LAMOTRIGINE; RESOLVED: 28JAN2016    Depression     Familial tremor     both hands    Fibromyalgia     LAST ASSESSED: 08DEC2017    GERD (gastroesophageal reflux disease)     Hearing aid worn     left ear    [place] (hard of hearing)     left ear    Hyperlipidemia     Hypertension     Left-sided weakness     Lumbar disc disease with radiculopathy 2/2/2018    Memory loss of unknown cause     long and short term    Migraine     Multiple closed fractures of metatarsal bone of right foot 5/2/2021    Obesity     Obesity, Class II, BMI 35-39.9     Osteoarthritis of both hips 10/31/2016    Osteoarthritis of knee 2/20/2013    Description: Continue Tylenol and Naproxen. Encourage exercise and weight loss. Patient refused physical therapy. I will refer the patient back to Orthopedics.    Overactive bladder     Panic attack     Patellofemoral disorder of both knees 5/1/2020    Post traumatic stress disorder     Primary localized osteoarthritis of both knees 6/16/2017    Primary osteoarthritis of both knees 5/1/2020    S/P insertion of spinal cord stimulator     no remote    S/P total knee arthroplasty, right 3/10/2022    Sacroiliitis (HCC) 2/28/2017    Seasonal allergies     Seizure-like  activity (HCC) 6/3/2022    Seizures (HCC)     possible seizure like activity    Small bowel obstruction (HCC) 3/24/2023    Status post total knee replacement, left 2022    Stroke (HCC)     questionable stroke 2009    Tardive dyskinesia     PATIENT STATES    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  CONT ASA 81 MG DAILY; RESOLVED: [id number]    Urinary incontinence     Uses walker     Wears dentures     partial lower / full upper- doesnt wear    Wears glasses        PAST SURGICAL HISTORY:  Past Surgical History:   Procedure Laterality Date    BACK SURGERY       SECTION      COLONOSCOPY      RESOLVED: 2016    EAR SURGERY      EGD      HYSTERECTOMY      MYRINGOTOMY W/ TUBES Left     NECK SURGERY  2019    ID ARTHRP KNE CONDYLE&PLATU MEDIAL&LAT COMPARTMENTS Right 3/9/2022    Procedure: ARTHROPLASTY KNEE TOTAL;  Surgeon: Chandni Tuttle DO;  Location: [facility];  Service: Orthopedics    ID ARTHRP KNE CONDYLE&PLATU MEDIAL&LAT COMPARTMENTS Left 2022    Procedure: ARTHROPLASTY KNEE TOTAL;  Surgeon: Chandni Tuttle DO;  [facility];  Service: Orthopedics    ID CYSTOURETHROSCOPY N/A 2016    Procedure: CYSTOSCOPY, BOTOX INJECTION;  Surgeon: Abraham Teague MD;  Location: [facility];  Service: Gynecology    ID INSJ/RPLCMT SPINAL NPG/RCVR POCKET CRTJ&CONNJ Right 2/10/2021    Procedure: REPLACEMENT IMPLANTABLE PULSE GENERATOR DORSAL SPINAL COLUMN STIMULATOR, RIGHT;  Surgeon: Carlos Alberto Jacome MD;  Location: [facility];  Service: Neurosurgery    ID PRQ IMPLTJ NSTIM ELECTRODE ARRAY EPIDURAL Right 2020    Procedure: INSERTION THORACIC DORSAL COLUMN SPINAL CORD STIMULATOR PERCUTANEOUS W IMPLANTABLE PULSE GENERATOR, RIGHT;  Surgeon: Carlos Alberto Jacome MD;  Location: [facility];  Service: Neurosurgery    TONSILLECTOMY      TUBAL LIGATION  1986    UPPER GASTROINTESTINAL ENDOSCOPY  2020       SOCIAL HISTORY:  Social History     Tobacco Use   Smoking Status  Never   Smokeless Tobacco Never          PHYSICAL EXAM:  58 y.o. female sitting comfortably on exam chair in no apparent distress.   Difficulty rising from a seated position.   Loss of coronal balance   Patient ambulates without normal gait, leaning forward, shuffling gait with use of rolling walker.   TTP over bilateral greater trochanteric bursa    Sensation decreased to light touch left L2 through S1 dermatomes.   Sensation decreased to light touch right L2 through S1 dermatomes     Left Motor: 3/5 iliopsoas, 3/5 quadriceps, 3/5 tibialis anterior, 3/5 extensor hallucis longus, and 3/5 gastrocsoleus.   Right Motor: 3/5 iliopsoas, 3/5 quadriceps, 3/5 tibialis anterior, 3/5 extensor hallucis longus, and 3/5 gastrocsoleus.     ROM:  Pain free ROM of bilateral hips     Straight leg raise test is negative Bilateral   The patient is well perfused distally.        RADIOGRAPHIC STUDIES:  X-ray, bilateral hips, 4/5/2023: 2 view normal x-rays of the pelvis and hips.  No evidence of significant SI joint abnormality.  Instrumentation down to the level of S1.  CT, abdomen pelvis, 5/25/2023: Evidence of prior extensive fusion from T8-S1 on the left and T9-S1 on the right side.  There is evidence of prior spinal cord placement at the T8-9 level.  There is also evidence of pseudoarthrosis at the upper part of the construct, T8-9 and possibly T9-10.  Upper thoracic spine were not visualized on the CT scan.  There is evidence of anterior interbody fusion at L2-3, L3-4, L4-5 and L5-S1.  There is clear evidence of pseudoarthrosis at L5-S1 both anteriorly and posteriorly.  Bilateral S1 screws are loose.  There is evidence of subsidence anteriorly with screw loosening and significant neuroforaminal stenosis.  X-rays, lumbar spine, 8/26/2024: Multilevel anterior posterior lumbar fusion with instrumentation.  There is evidence of screw loosening and interbody loosening at L5-S1.    ASSESSMENT:  1. Lumbar spine pain  -     XR spine lumbar  minimum 4 views non injury; Future; Expected date: 08/26/2024  -     Ambulatory Referral to Physiatry; Future  2. Lumbar radiculopathy  -     Ambulatory Referral to Physiatry; Future  3. Ambulatory dysfunction  -     Ambulatory Referral to Physiatry; Future  4. History of lumbar surgery  -     Ambulatory Referral to Physiatry; Future  5. Low back pain without sciatica, unspecified back pain laterality, unspecified chronicity  -     Ambulatory Referral to Physiatry; Future      PLAN:  58 y.o. female with low back pain, lumbar radiculopathy, history of thoracolumbar surgery and ambulatory dysfunction. The patient has a spinal cord stimulator.  She does have a number of other medical issues including history of stroke.  She has significant amatory dysfunction flatback deformity and leans forward approximately 30 degrees when ambulating.    The patient's x-rays of lumbar spine were reviewed today.  We were unable to obtain standing scoliosis films.  Those x-rays will be obtained next week.  She will come back next week for further evaluation    There was an in depth conversation had about the procedure that would be performed. It was discussed surgery is warranted to preserve function, will not alleviate pain, and there is potential the patient may have an increase in pain following surgery.     Risks of surgery, including but not limited to, anesthesia complications, infection, damage to nerves and blood vessels, blood loss needing a transfusion, blood clots, postoperative stiffness, residual pain, residual weakness, drop foot, dural tearing, adjacent segment disease, and even death were discussed at length.  If the patient has a dural tear, they will be in bed for approximately 32 hours following surgery. Patient was informed surgery would be indicated to improve function, and would likely no improve leg and arm pain.      The patient was referred to Dr. Daily for pain management.     Xray supine with legs straight of  lumbar spine, x-ray entire spine will be required for surgical planning to see how much of a deformity is present. I will see the patient back in 1 week for updated x-rays listed above.        Scribe Attestation      I,:  Denise Corea am acting as a scribe while in the presence of the attending physician.:       I,:  Yenni Stevens MD personally performed the services described in this documentation    as scribed in my presence.:

## 2024-08-26 NOTE — PATIENT INSTRUCTIONS
Begin aqua therapy.     [employer], [employer], [employer] are good brands but I recommend going to a dedicated shoe store (not [employer]). At these types of stores, they have experts that can fit you for shoes appropriate for your foot problem. Shoe choice is essential to solving/improving most types of foot pain.  Even after a surgery, good shoes are necessary to keep the foot as comfortable as possible. Bring along orthotics if you have them for optimal shoe fit.     [employer]  [address] [place] [address]  [phone]    [employer]  [address], [place] [address]  [phone]    [employer]  [address] [place] [address] [phone]    [employer]   [address]  [phone]    [employer]  [address], [place] [address] [phone]    [employer] * may not do personalized shoe evaluation *  [address] [facility] [phone]    [facility] [employer]   [address] [place] [address] [phone]    [employer]  [address] [place], [place]  [phone]    [employer] [address], [address]  [phone]    [employer] [address], [address], [place] [address] [phone]

## 2024-08-28 ENCOUNTER — TELEPHONE (OUTPATIENT)
Dept: INTERNAL MEDICINE CLINIC | Facility: CLINIC | Age: 59
End: 2024-08-28

## 2024-08-29 ENCOUNTER — TELEPHONE (OUTPATIENT)
Dept: OBGYN CLINIC | Facility: HOSPITAL | Age: 59
End: 2024-08-29

## 2024-08-29 NOTE — TELEPHONE ENCOUNTER
Called and spoke with pt. She states she is having extreme pain. She is also having numbness and swelling in her b/l LE and its hard to move the legs. She states she does get swelling on and off with her pain and always has so swelling not new. She is taking tylenol and ibuprofen with no relief. She was referred to physiatry however she states they do not take her insurance. No bowel or bladder incontinence but she is having frequent urination but no burning. I did advise pt if she was in that much pain she could go to the ER to be evaluated but she refused, tried one more time before ending the conversation to let pt know if she is having that much pain with numbness and swelling that she should go to ER, pt again refused. She would like something for pain called in to [facility] [address] in [place]. Advised pt that  may not be able to call in pain med, she stated understanding. Please review and advise

## 2024-08-29 NOTE — TELEPHONE ENCOUNTER
Caller: Patient    Doctor: Hilda    Reason for call: Patient is having pain in lower back and swelling in her legs. Patient is taking Tylenol and Motrin without any pain relief and is requesting meds to help with the pain.    Call back#: [phone]

## 2024-08-29 NOTE — TELEPHONE ENCOUNTER
Caller: Self    Doctor: Hilda    Reason for call: Patient returning call, warm transferred to triage nurse    Call back#: [phone]

## 2024-09-03 ENCOUNTER — OFFICE VISIT (OUTPATIENT)
Dept: OBGYN CLINIC | Facility: CLINIC | Age: 59
End: 2024-09-03
Payer: COMMERCIAL

## 2024-09-03 VITALS — HEIGHT: 61 IN | BODY MASS INDEX: 36.63 KG/M2 | WEIGHT: 194 LBS

## 2024-09-03 DIAGNOSIS — M41.9 SCOLIOSIS, UNSPECIFIED SCOLIOSIS TYPE, UNSPECIFIED SPINAL REGION: Primary | ICD-10-CM

## 2024-09-03 DIAGNOSIS — R26.2 AMBULATORY DYSFUNCTION: ICD-10-CM

## 2024-09-03 DIAGNOSIS — S32.009K PSEUDOARTHROSIS OF LUMBAR SPINE: ICD-10-CM

## 2024-09-03 DIAGNOSIS — Z01.818 PRE-OP TESTING: ICD-10-CM

## 2024-09-03 DIAGNOSIS — M54.50 LUMBAR SPINE PAIN: ICD-10-CM

## 2024-09-03 DIAGNOSIS — Z98.890 HISTORY OF LUMBAR SURGERY: ICD-10-CM

## 2024-09-03 DIAGNOSIS — E66.01 OBESITY, MORBID (HCC): ICD-10-CM

## 2024-09-03 PROCEDURE — 99214 OFFICE O/P EST MOD 30 MIN: CPT | Performed by: ORTHOPAEDIC SURGERY

## 2024-09-03 NOTE — PROGRESS NOTES
[facility] ORTHOPEDIC SPINE SURGERY  DR.AMIR LITA MD  [address] [facility] [address] [phone]    HISTORY OF PRESENT ILLNESS:    Samantha Rodriguez is a 58 y.o. female who presents for follow-up of lumbar spine. The patient was last seen in the office on 8/26/2024 where possible surgical intervention was discussed. Patient reports she continues to be unable to stand. Patient continues to note significant pain in her back. Patient mentions she is unable to see Dr. Daily as he does not take her insurance.       ALLERGIES: No Known Allergies    MEDICATIONS:    Current Outpatient Medications:     acetaminophen (TYLENOL) 500 mg tablet, Take 1 tablet (500 mg total) by mouth every 8 (eight) hours as needed for mild pain, Disp: 180 tablet, Rfl: 1    aluminum-magnesium hydroxide 200-200 MG/5ML suspension, Take 5 mL by mouth every 6 (six) hours as needed for heartburn, Disp: 355 mL, Rfl: 0    aspirin (Aspirin Low Dose) 81 mg EC tablet, Take 1 tablet (81 mg total) by mouth daily, Disp: 30 tablet, Rfl: 11    atorvastatin (LIPITOR) 40 mg tablet, Take 1 tablet (40 mg total) by mouth daily, Disp: 30 tablet, Rfl: 3    clonazePAM (KlonoPIN) 0.5 mg tablet, Take 0.5 mg by mouth daily as needed, Disp: , Rfl:     Diclofenac Sodium (SOLARAZE) 3 % GEL, Apply 1 Application topically 2 (two) times a day, Disp: 100 g, Rfl: 5    Diclofenac Sodium (VOLTAREN) 1 %, Apply 2 g topically 4 (four) times a day as needed (pain), Disp: 150 g, Rfl: 3    dicyclomine (BENTYL) 10 mg capsule, Take 1 capsule (10 mg total) by mouth 4 (four) times a day (before meals and at bedtime), Disp: 120 capsule, Rfl: 2    famotidine (PEPCID) 20 mg tablet, Take 2 tablets (40 mg total) by mouth daily at bedtime, Disp: 60 tablet, Rfl: 3    linaCLOtide (Linzess) 290 MCG CAPS, Take 1 capsule by mouth daily before breakfast, Disp: 30 capsule, Rfl: 3    melatonin 3 mg, Take 2 tablets (6 mg total) by mouth daily at bedtime, Disp: 60 tablet, Rfl: 0    mirtazapine  (REMERON) 15 mg tablet, Take 0.5 tablets (7.5 mg total) by mouth daily at bedtime, Disp: 30 tablet, Rfl: 0    pantoprazole (PROTONIX) 40 mg tablet, Take 1 tablet (40 mg total) by mouth 2 (two) times a day before meals, Disp: 60 tablet, Rfl: 2    polyethylene glycol (GOLYTELY) 4000 mL solution, Take as instructed on bowel preparation instruction, Disp: 4000 mL, Rfl: 0    pregabalin (LYRICA) 100 mg capsule, Take 1 capsule (100 mg total) by mouth daily, Disp: 30 capsule, Rfl: 0    QUEtiapine (SEROquel) 50 mg tablet, Take 50 mg by mouth 2 (two) times a day, Disp: , Rfl:     Valbenazine Tosylate (Ingrezza) 60 MG CAPS, Take 1 capsule by mouth in the morning, Disp: 90 capsule, Rfl: 1    amLODIPine (NORVASC) 5 mg tablet, Take 1 tablet (5 mg total) by mouth daily, Disp: 30 tablet, Rfl: 2    busPIRone (BUSPAR) 15 mg tablet, Take 1 tablet (15 mg total) by mouth 3 (three) times a day, Disp: 90 tablet, Rfl: 2    clonazePAM (KlonoPIN) 1 mg tablet, Take 1 tablet (1 mg total) by mouth 2 (two) times a day for 10 days, Disp: 20 tablet, Rfl: 0    hydrOXYzine pamoate (VISTARIL) 50 mg capsule, Take 1 capsule (50 mg total) by mouth 3 (three) times a day, Disp: 270 capsule, Rfl: 0    loratadine (CLARITIN) 10 mg tablet, Take 1 tablet (10 mg total) by mouth daily, Disp: 30 tablet, Rfl: 2    methocarbamol (ROBAXIN) 500 mg tablet, Take 1 tablet (500 mg total) by mouth 4 (four) times a day for 14 days, Disp: 56 tablet, Rfl: 0    QUEtiapine (SEROquel XR) 50 mg, Take 4 tablets (200 mg total) by mouth daily at bedtime (Patient taking differently: Take 50 mg by mouth daily at bedtime Only taking 2, 50 mg a day), Disp: 360 tablet, Rfl: 0    sertraline (ZOLOFT) 100 mg tablet, Take 1 tablet (100 mg total) by mouth daily, Disp: 90 tablet, Rfl: 0    topiramate (TOPAMAX) 100 mg tablet, Take 1 tablet (100 mg total) by mouth daily, Disp: 90 tablet, Rfl: 0     PAST MEDICAL HISTORY:   Past Medical History:   Diagnosis Date    Anxiety     Arthritis     left  knee    Arthritis of right knee 10/6/2020    At risk for falls     Bipolar 2 disorder (HCC)     FOLLOWS WITH PSYCHIATRIST. CONTINUE LAMOTRIGINE; RESOLVED: 2016    Depression     Familial tremor     both hands    Fibromyalgia     LAST ASSESSED: 2017    GERD (gastroesophageal reflux disease)     Hearing aid worn     left ear    [place] (hard of hearing)     left ear    Hyperlipidemia     Hypertension     Left-sided weakness     Lumbar disc disease with radiculopathy 2018    Memory loss of unknown cause     long and short term    Migraine     Multiple closed fractures of metatarsal bone of right foot 2021    Obesity     Obesity, Class II, BMI 35-39.9     Osteoarthritis of both hips 10/31/2016    Osteoarthritis of knee 2013    Description: Continue Tylenol and Naproxen. Encourage exercise and weight loss. Patient refused physical therapy. I will refer the patient back to Orthopedics.    Overactive bladder     Panic attack     Patellofemoral disorder of both knees 2020    Post traumatic stress disorder     Primary localized osteoarthritis of both knees 2017    Primary osteoarthritis of both knees 2020    S/P insertion of spinal cord stimulator     no remote    S/P total knee arthroplasty, right 3/10/2022    Sacroiliitis (HCC) 2017    Seasonal allergies     Seizure-like activity (HCC) 6/3/2022    Seizures (HCC)     possible seizure like activity    Small bowel obstruction (HCC) 3/24/2023    Status post total knee replacement, left 2022    Stroke (HCC)     questionable stroke 2009    Tardive dyskinesia     PATIENT STATES    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  CONT ASA 81 MG DAILY; RESOLVED: [id number]    Urinary incontinence     Uses walker     Wears dentures     partial lower / full upper- doesnt wear    Wears glasses        PAST SURGICAL HISTORY:  Past Surgical History:   Procedure Laterality Date    BACK SURGERY       SECTION      COLONOSCOPY       RESOLVED: [id number]    EAR SURGERY      EGD      HYSTERECTOMY  2004    MYRINGOTOMY W/ TUBES Left     NECK SURGERY  04/2019    MI ARTHRP KNE CONDYLE&PLATU MEDIAL&LAT COMPARTMENTS Right 3/9/2022    Procedure: ARTHROPLASTY KNEE TOTAL;  Surgeon: Chandni Tuttle DO;  Location: [facility];  Service: Orthopedics    MI ARTHRP KNE CONDYLE&PLATU MEDIAL&LAT COMPARTMENTS Left 7/5/2022    Procedure: ARTHROPLASTY KNEE TOTAL;  Surgeon: Chandni Tuttle DO;  Location: [facility];  Service: Orthopedics    MI CYSTOURETHROSCOPY N/A 2/18/2016    Procedure: CYSTOSCOPY, BOTOX INJECTION;  Surgeon: Abraham Teague MD;  Location: [facility];  Service: Gynecology    MI INSJ/RPLCMT SPINAL NPG/RCVR POCKET CRTJ&CONNJ Right 2/10/2021    Procedure: REPLACEMENT IMPLANTABLE PULSE GENERATOR DORSAL SPINAL COLUMN STIMULATOR, RIGHT;  Surgeon: Carlos Alberto Jacome MD;  Location: [facility];  Service: Neurosurgery    MI PRQ IMPLTJ NSTIM ELECTRODE ARRAY EPIDURAL Right 7/28/2020    Procedure: INSERTION THORACIC DORSAL COLUMN SPINAL CORD STIMULATOR PERCUTANEOUS W IMPLANTABLE PULSE GENERATOR, RIGHT;  Surgeon: Carlos Alberto Jacome MD;  Location: [facility];  Service: Neurosurgery    TONSILLECTOMY      TUBAL LIGATION  1986    UPPER GASTROINTESTINAL ENDOSCOPY  09/2020       SOCIAL HISTORY:  Social History     Tobacco Use   Smoking Status Never   Smokeless Tobacco Never          PHYSICAL EXAM:  58 y.o. female sitting comfortably on exam chair in no apparent distress.   Difficulty rising from a seated position.   Loss of coronal balance   Patient ambulates without normal gait, leaning forward, shuffling gait with use of rolling walker.       RADIOGRAPHIC STUDIES:  X-ray, bilateral hips, 4/5/2023: 2 view normal x-rays of the pelvis and hips.  No evidence of significant SI joint abnormality.  Instrumentation down to the level of S1.  CT, abdomen pelvis, 5/25/2023: Evidence of prior extensive fusion from T8-S1 on the left and T9-S1 on the right side.  There  is evidence of prior spinal cord placement at the T8-9 level.  There is also evidence of pseudoarthrosis at the upper part of the construct, T8-9 and possibly T9-10.  Upper thoracic spine were not visualized on the CT scan.  There is evidence of anterior interbody fusion at L2-3, L3-4, L4-5 and L5-S1.  There is clear evidence of pseudoarthrosis at L5-S1 both anteriorly and posteriorly.  Bilateral S1 screws are loose.  There is evidence of subsidence anteriorly with screw loosening and significant neuroforaminal stenosis.  X-rays, lumbar spine, 8/26/2024: Multilevel anterior posterior lumbar fusion with instrumentation.  There is evidence of screw loosening and interbody loosening at L5-S1.  Xrays, scoliosis films, 9/03/2024: Extensive thoracolumbar fusion to the level of S1.  There is evidence of deformity focalized to the L5-S1 level with significant sagittal imbalance.      ASSESSMENT:  1. Scoliosis, unspecified scoliosis type, unspecified spinal region  -     XR spine thoracic 2 vw; Future; Expected date: 09/03/2024      PLAN:  58 y.o. female withlow back pain, lumbar radiculopathy, history of thoracolumbar surgery and ambulatory dysfunction.     Scoliosis films were obtained and reviewed in the office today. It was discussed that surgical intervention may be performed to correct spinal deformity and preserve function. Surgery will be an extensive procedure involving a posterior approach followed by a possible anterior approach. [facility] will [facility] and will be done by both Dr. Stevens and Dr. Bills.  Surgery would involve correction of deformity through a posterior approach.  Unfortunate she has pseudoarthrosis at multilevel anterior interbody fusion.  It is extremely unlikely that the correction can be complete given the extensive anterior approach.  Surgery osteotomy may be required to correct for the deformity.  In the long run she most likely will need anterior column  grafted.    Risk of the surgery was discussed with the patient.  We are going to attempt to find time at [facility] for both surgeons.  I will see the patient back and consent and H&P will be obtained at time of next visit.    Patient will follow-up in the near future for further surgical discussion.      Scribe Attestation      I,:  Janice Pina PA-C am acting as a scribe while in the presence of the attending physician.:       I,:  Yenni Stevens MD personally performed the services described in this documentation    as scribed in my presence.:

## 2024-09-03 NOTE — SOCIAL WORK
PT rec STR -      TCF is reviewing    Pt was at [facility] in March 2020, was optioned for STR, optioning paperwork came back with approval for her to go to SNF for rehab, however, she went straight home as her functinoal status had improved while she was waiting  Sent SNF referrals with Letter of Determination from March to see if she can be a direct admit to a SNF  No

## 2024-09-09 RX ORDER — CHLORHEXIDINE GLUCONATE ORAL RINSE 1.2 MG/ML
15 SOLUTION DENTAL ONCE
OUTPATIENT
Start: 2024-09-09 | End: 2024-09-09

## 2024-09-09 NOTE — TELEPHONE ENCOUNTER
I was notified by RN about positive blood culture drawn 7/9/2024 growing gram-positive cocci in clusters.  Verigene  showed Staphylococcus species, positive for coagulase-negative staphylococci.  She is being treated with ceftriaxone and azithromycin for community-acquired pneumonia.  She is hemodynamically stable.  Above blood culture likely a contaminant.  Will not add any further antibiotics at this time.  Will repeat blood cultures   Was this addressed?

## 2024-09-13 ENCOUNTER — TELEPHONE (OUTPATIENT)
Age: 59
End: 2024-09-13

## 2024-09-13 ENCOUNTER — TELEPHONE (OUTPATIENT)
Dept: FAMILY MEDICINE CLINIC | Facility: CLINIC | Age: 59
End: 2024-09-13

## 2024-09-13 DIAGNOSIS — Z86.73 HISTORY OF CVA (CEREBROVASCULAR ACCIDENT): ICD-10-CM

## 2024-09-13 DIAGNOSIS — K21.9 GERD WITHOUT ESOPHAGITIS: ICD-10-CM

## 2024-09-13 NOTE — TELEPHONE ENCOUNTER
My name is aicha pedro, my date of birth is [birth year] i'm just calling because i was given your name. Like Barbie, I'm insurance, I'm just woke up because I'm a member of that is going to start next month and they said they covered dental implants. And i was just wondering if we have a dentistry there. Price or coverage is seven thousand dollars. I just thought that sometimes it happens. [place] but i got a couple thousand for the trap and a couple thousand for five. I'm talking about something like that. OK, thank you bye bye my Phone number is. My Phone number is. Four excuse me eight, three five, two, two, two. Three, Four, Nine Five. Eight, three five, two, two, two, three, four nine five again, my name is aicha pedro. My birthday [birth year]. I'm not with that yet i will be at the first of the month. I'm just calling for information. OK, about OK, thank you welcomesylvia. [place]. Stop running around. [place].      Left message to inform patient she contacted the wrong office and left phone number for dental.

## 2024-09-16 RX ORDER — ATORVASTATIN CALCIUM 40 MG/1
40 TABLET, FILM COATED ORAL DAILY
Qty: 30 TABLET | Refills: 3 | Status: SHIPPED | OUTPATIENT
Start: 2024-09-16 | End: 2024-09-17 | Stop reason: SDUPTHER

## 2024-09-16 RX ORDER — FAMOTIDINE 20 MG/1
40 TABLET, FILM COATED ORAL
Qty: 60 TABLET | Refills: 3 | Status: SHIPPED | OUTPATIENT
Start: 2024-09-16

## 2024-09-17 DIAGNOSIS — Z86.73 HISTORY OF CVA (CEREBROVASCULAR ACCIDENT): ICD-10-CM

## 2024-09-17 RX ORDER — ATORVASTATIN CALCIUM 40 MG/1
40 TABLET, FILM COATED ORAL DAILY
Qty: 30 TABLET | Refills: 3 | Status: SHIPPED | OUTPATIENT
Start: 2024-09-17

## 2024-09-23 DIAGNOSIS — F41.1 GENERALIZED ANXIETY DISORDER WITH PANIC ATTACKS: ICD-10-CM

## 2024-09-23 DIAGNOSIS — F41.0 GENERALIZED ANXIETY DISORDER WITH PANIC ATTACKS: ICD-10-CM

## 2024-09-24 ENCOUNTER — TELEPHONE (OUTPATIENT)
Dept: OBGYN CLINIC | Facility: HOSPITAL | Age: 59
End: 2024-09-24

## 2024-09-24 RX ORDER — TOPIRAMATE 100 MG/1
100 TABLET, FILM COATED ORAL DAILY
Qty: 90 TABLET | Refills: 1 | Status: SHIPPED | OUTPATIENT
Start: 2024-09-24 | End: 2025-03-23

## 2024-09-24 NOTE — TELEPHONE ENCOUNTER
Caller: Samantha    Doctor: Hilda    Reason for call: Patient is calling back again to speak with Dr Stevens about future back SX  Please call patient  Thank you    Call back#:[phone]  [facility] room

## 2024-09-24 NOTE — TELEPHONE ENCOUNTER
Occupational Therapy    OT Treatment    Patient Name: Gilberto White  MRN: [id number]  Department: [address]  Room: [facility]-A  Today's Date: 9/24/2024  Time Calculation  Start Time: 1413  Stop Time: 1429  Time Calculation (min): 16 min        Assessment:  End of Session Communication: Bedside nurse, PCT/NA/CTA  End of Session Patient Position: Up in chair, Alarm on     Plan:  Treatment Interventions: ADL retraining, Functional transfer training, UE strengthening/ROM, Endurance training  OT Frequency: 4 times per week  OT Discharge Recommendations: No OT needed after discharge  OT Recommended Transfer Status: Stand by assist  OT - OK to Discharge: Yes ((when medically approp.))  Treatment Interventions: ADL retraining, Functional transfer training, UE strengthening/ROM, Endurance training    Subjective   Previous Visit Info:  OT Last Visit  OT Received On: 09/24/24  General:  General  Prior to Session Communication: Bedside nurse  Patient Position Received: Alarm on, Up in chair  General Comment: pt agreeable to OT tx session  Precautions:  Medical Precautions: Fall precautions  Post-Surgical Precautions: Abdominal surgery precautions          Pain:  Pain Assessment  Pain Assessment: 0-10  0-10 (Numeric) Pain Score: 2  Pain Type: Acute pain, Surgical pain  Pain Location: Abdomen    Objective    Cognition:  Cognition  Overall Cognitive Status: Within Functional Limits  Orientation Level: Oriented X4  Coordination:     Activities of Daily Living: LE Dressing  LE Dressing: Yes  Pants Level of Assistance: Setup, Close supervision  LE Dressing Where Assessed: Chair  Functional Standing Tolerance:  Time: 2-3 min standing bouts  Activity: pt tolerated standing bouts with close supv and no LOB noted. No DME used  Bed Mobility/Transfers: Transfers  Transfer: Yes  Transfer 1  Transfer From 1: Chair with arms to  Transfer to 1: Stand  Technique 1: Sit to stand, Stand to sit  Transfer Level of Assistance 1: Close  Patient left a message and I returned her call to inform her that [facility]) had cleared her for surgery as far as Neurology is concerned and the paperwork was faxed to Sahra Alexandre at Dr Goldie Echavarria office at [phone]  supervision      Functional Mobility:  Functional Mobility  Functional Mobility Performed: Yes  Functional Mobility 1  Comments 1: over household distance of functional mobility with no DME and cga-sba, no LOB noted  Sitting Balance:  Static Sitting Balance  Static Sitting-Level of Assistance: Independent  Dynamic Sitting Balance  Dynamic Sitting-Level of Assistance: Independent  Standing Balance:  Static Standing Balance  Static Standing-Level of Assistance: Distant supervision  Dynamic Standing Balance  Dynamic Standing-Level of Assistance: Close supervision      Outcome Measures:[facility] Daily Activity  Putting on and taking off regular lower body clothing: A little  Bathing (including washing, rinsing, drying): A little  Putting on and taking off regular upper body clothing: None  Toileting, which includes using toilet, bedpan or urinal: A little  Taking care of personal grooming such as brushing teeth: None  Eating Meals: None  Daily Activity - Total Score: 21        Education Documentation  Precautions, taught by LILIANA Singer at 9/24/2024  2:51 PM.  Learner: Patient  Readiness: Acceptance  Method: Explanation  Response: Verbalizes Understanding    Education Comments  No comments found.        OP EDUCATION:       Goals:  Encounter Problems       Encounter Problems (Active)       ADLs       SBA LB dressing  (Progressing)       Start:  09/23/24    Expected End:  09/30/24               MOBILITY       [facility]. mobility  (Progressing)       Start:  09/23/24    Expected End:  09/30/24

## 2024-09-24 NOTE — TELEPHONE ENCOUNTER
Caller: Patient     Doctor: Hilda     Reason for call: Patient would like to discuss surgery, patient is currently in hospital    Call back#: [phone]

## 2024-09-24 NOTE — TELEPHONE ENCOUNTER
Hilda     Pt states she is admitted to the  ([facility]) for sever back pain. Would like to speak to Dr. Stevens.  States they her to start in patient rehab.     Pt would like more information on her surgery. States it is for November (case request in but date not set yet. Attempted to schedule follow up but declined.        Callback: [phone]     Hospital room number: [phone] (preferred)

## 2024-09-25 NOTE — TELEPHONE ENCOUNTER
CLM on pt's id VM w/DENISE Pina's msg. Gave contact number  to CB to schedule appt in mid October to discuss surgery and sign consents. Await CB.

## 2024-09-25 NOTE — TELEPHONE ENCOUNTER
Caller: Patient     Doctor: Hilda     Reason for call: Patient is calling again from [facility], Room #6B-18B [phone], to discuss the plan for surgery. She is unable to come in for a f/u appt to sign consent and schedule surgery. She is asking for a call back ASAP to schedule surgery     Call back#: [phone] mobile phone

## 2024-09-26 NOTE — TELEPHONE ENCOUNTER
Caller: Patient    Doctor: Hilda    Reason for call: Patient called again to go over surgery details. States she is currently in the hospital and unwilling to schedule the follow up for mid October. Tosin transferred the call to Marly.    Call back#: [phone]

## 2024-09-26 NOTE — TELEPHONE ENCOUNTER
Received transfer call from pt, she states that she is currently in [facility] and has been since Friday to try to get her pain under control. She states she will probably be going to rehab from there so she wasn't sure if she would be able to get to an appt. Advised most rehabs are able to transport pts to appts, she stated understanding and made an appt for 10/15/24 she didn't have a pen so she will call back to get the address for the office.

## 2024-10-15 ENCOUNTER — OFFICE VISIT (OUTPATIENT)
Age: 59
End: 2024-10-15
Payer: COMMERCIAL

## 2024-10-15 ENCOUNTER — PREP FOR PROCEDURE (OUTPATIENT)
Age: 59
End: 2024-10-15

## 2024-10-15 VITALS
SYSTOLIC BLOOD PRESSURE: 110 MMHG | HEIGHT: 61 IN | WEIGHT: 212 LBS | DIASTOLIC BLOOD PRESSURE: 82 MMHG | BODY MASS INDEX: 40.02 KG/M2

## 2024-10-15 DIAGNOSIS — M54.50 LUMBAR SPINE PAIN: ICD-10-CM

## 2024-10-15 DIAGNOSIS — M41.9 SCOLIOSIS, UNSPECIFIED SCOLIOSIS TYPE, UNSPECIFIED SPINAL REGION: Primary | ICD-10-CM

## 2024-10-15 DIAGNOSIS — R26.2 AMBULATORY DYSFUNCTION: ICD-10-CM

## 2024-10-15 DIAGNOSIS — S32.009K PSEUDOARTHROSIS OF LUMBAR SPINE: ICD-10-CM

## 2024-10-15 DIAGNOSIS — Z98.890 HISTORY OF LUMBAR SURGERY: ICD-10-CM

## 2024-10-15 DIAGNOSIS — M54.16 LUMBAR RADICULOPATHY: ICD-10-CM

## 2024-10-15 PROCEDURE — 99214 OFFICE O/P EST MOD 30 MIN: CPT | Performed by: ORTHOPAEDIC SURGERY

## 2024-10-15 RX ORDER — CHLORHEXIDINE GLUCONATE ORAL RINSE 1.2 MG/ML
15 SOLUTION DENTAL ONCE
OUTPATIENT
Start: 2024-10-15 | End: 2024-10-15

## 2024-10-15 NOTE — PROGRESS NOTES
[facility] ORTHOPEDIC SPINE SURGERY  DR.AMIR LITA MD  [address] [facility] [address] [phone]    HISTORY OF PRESENT ILLNESS:    Samantha Rodriguez is a 58 y.o. female who presents for follow-up of lumbar spine. The patient was last seen in the office on 8/26/2024 where possible surgical intervention was discussed. Patient reports she continues to be unable to stand. Patient continues to note significant pain in her back. Patient mentions she is unable to see Dr. Daily as he does not take her insurance.     Interval history 10/15/2024:  The patient presents for surgical planning of lumbar spine.  She mentions visiting ED recently due to increased symptoms.  Today she continues to complain of low back pain with bilateral leg pain to ankle.  Most activity is difficult.  She does use rolling walker for ambulation.  She denies blood thinning medication.  She denies tobacco use.  She denies diabetes.        ALLERGIES: No Known Allergies    MEDICATIONS:    Current Outpatient Medications:     acetaminophen (TYLENOL) 500 mg tablet, Take 1 tablet (500 mg total) by mouth every 8 (eight) hours as needed for mild pain, Disp: 180 tablet, Rfl: 1    aluminum-magnesium hydroxide 200-200 MG/5ML suspension, Take 5 mL by mouth every 6 (six) hours as needed for heartburn, Disp: 355 mL, Rfl: 0    aspirin (Aspirin Low Dose) 81 mg EC tablet, Take 1 tablet (81 mg total) by mouth daily, Disp: 30 tablet, Rfl: 11    atorvastatin (LIPITOR) 40 mg tablet, Take 1 tablet (40 mg total) by mouth daily, Disp: 30 tablet, Rfl: 3    clonazePAM (KlonoPIN) 0.5 mg tablet, Take 0.5 mg by mouth daily as needed, Disp: , Rfl:     Diclofenac Sodium (SOLARAZE) 3 % GEL, Apply 1 Application topically 2 (two) times a day, Disp: 100 g, Rfl: 5    Diclofenac Sodium (VOLTAREN) 1 %, Apply 2 g topically 4 (four) times a day as needed (pain), Disp: 150 g, Rfl: 3    dicyclomine (BENTYL) 10 mg capsule, Take 1 capsule (10 mg total) by mouth 4 (four) times a day  (before meals and at bedtime), Disp: 120 capsule, Rfl: 2    famotidine (PEPCID) 20 mg tablet, TAKE 2 TABLETS (40 MG TOTAL) BY MOUTH DAILY AT BEDTIME, Disp: 60 tablet, Rfl: 3    linaCLOtide (Linzess) 290 MCG CAPS, Take 1 capsule by mouth daily before breakfast, Disp: 30 capsule, Rfl: 3    melatonin 3 mg, Take 2 tablets (6 mg total) by mouth daily at bedtime, Disp: 60 tablet, Rfl: 0    mirtazapine (REMERON) 15 mg tablet, Take 0.5 tablets (7.5 mg total) by mouth daily at bedtime, Disp: 30 tablet, Rfl: 0    pantoprazole (PROTONIX) 40 mg tablet, Take 1 tablet (40 mg total) by mouth 2 (two) times a day before meals, Disp: 60 tablet, Rfl: 2    polyethylene glycol (GOLYTELY) 4000 mL solution, Take as instructed on bowel preparation instruction, Disp: 4000 mL, Rfl: 0    pregabalin (LYRICA) 100 mg capsule, Take 1 capsule (100 mg total) by mouth daily, Disp: 30 capsule, Rfl: 0    QUEtiapine (SEROquel) 50 mg tablet, Take 50 mg by mouth 2 (two) times a day, Disp: , Rfl:     topiramate (TOPAMAX) 100 mg tablet, Take 1 tablet (100 mg total) by mouth daily, Disp: 90 tablet, Rfl: 1    Valbenazine Tosylate (Ingrezza) 60 MG CAPS, Take 1 capsule by mouth in the morning, Disp: 90 capsule, Rfl: 1    amLODIPine (NORVASC) 5 mg tablet, Take 1 tablet (5 mg total) by mouth daily, Disp: 30 tablet, Rfl: 2    busPIRone (BUSPAR) 15 mg tablet, Take 1 tablet (15 mg total) by mouth 3 (three) times a day, Disp: 90 tablet, Rfl: 2    clonazePAM (KlonoPIN) 1 mg tablet, Take 1 tablet (1 mg total) by mouth 2 (two) times a day for 10 days, Disp: 20 tablet, Rfl: 0    hydrOXYzine pamoate (VISTARIL) 50 mg capsule, Take 1 capsule (50 mg total) by mouth 3 (three) times a day, Disp: 270 capsule, Rfl: 0    loratadine (CLARITIN) 10 mg tablet, Take 1 tablet (10 mg total) by mouth daily, Disp: 30 tablet, Rfl: 2    methocarbamol (ROBAXIN) 500 mg tablet, Take 1 tablet (500 mg total) by mouth 4 (four) times a day for 14 days, Disp: 56 tablet, Rfl: 0    QUEtiapine  (SEROquel XR) 50 mg, Take 4 tablets (200 mg total) by mouth daily at bedtime (Patient taking differently: Take 50 mg by mouth daily at bedtime Only taking 2, 50 mg a day), Disp: 360 tablet, Rfl: 0    sertraline (ZOLOFT) 100 mg tablet, Take 1 tablet (100 mg total) by mouth daily, Disp: 90 tablet, Rfl: 0     PAST MEDICAL HISTORY:   Past Medical History:   Diagnosis Date    Anxiety     Arthritis     left knee    Arthritis of right knee 10/6/2020    At risk for falls     Bipolar 2 disorder (HCC)     FOLLOWS WITH PSYCHIATRIST. CONTINUE LAMOTRIGINE; RESOLVED: [id number]    Depression     Familial tremor     both hands    Fibromyalgia     LAST ASSESSED: 08DEC2017    GERD (gastroesophageal reflux disease)     Hearing aid worn     left ear    [place] (hard of hearing)     left ear    Hyperlipidemia     Hypertension     Left-sided weakness     Lumbar disc disease with radiculopathy 2/2/2018    Memory loss of unknown cause     long and short term    Migraine     Multiple closed fractures of metatarsal bone of right foot 5/2/2021    Obesity     Obesity, Class II, BMI 35-39.9     Osteoarthritis of both hips 10/31/2016    Osteoarthritis of knee 2/20/2013    Description: Continue Tylenol and Naproxen. Encourage exercise and weight loss. Patient refused physical therapy. I will refer the patient back to Orthopedics.    Overactive bladder     Panic attack     Patellofemoral disorder of both knees 5/1/2020    Post traumatic stress disorder     Primary localized osteoarthritis of both knees 6/16/2017    Primary osteoarthritis of both knees 5/1/2020    S/P insertion of spinal cord stimulator     no remote    S/P total knee arthroplasty, right 3/10/2022    Sacroiliitis (HCC) 2/28/2017    Seasonal allergies     Seizure-like activity (HCC) 6/3/2022    Seizures (HCC)     possible seizure like activity    Small bowel obstruction (HCC) 3/24/2023    Status post total knee replacement, left 7/5/2022    Stroke (HCC)     questionable stroke 2009     Tardive dyskinesia     PATIENT STATES    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  CONT ASA 81 MG DAILY; RESOLVED: [id number]    Urinary incontinence     Uses walker     Wears dentures     partial lower / full upper- doesnt wear    Wears glasses        PAST SURGICAL HISTORY:  Past Surgical History:   Procedure Laterality Date    BACK SURGERY       SECTION      COLONOSCOPY      RESOLVED: 2016    EAR SURGERY      EGD      HYSTERECTOMY      MYRINGOTOMY W/ TUBES Left     NECK SURGERY  2019    VA ARTHRP KNE CONDYLE&PLATU MEDIAL&LAT COMPARTMENTS Right 3/9/2022    Procedure: ARTHROPLASTY KNEE TOTAL;  Surgeon: Chandni Tuttle DO;  Location: [facility];  Service: Orthopedics    VA ARTHRP KNE CONDYLE&PLATU MEDIAL&LAT COMPARTMENTS Left 2022    Procedure: ARTHROPLASTY KNEE TOTAL;  Surgeon: Chandni Tuttle DO;  Location: [facility];  Service: Orthopedics    VA CYSTOURETHROSCOPY N/A 2016    Procedure: CYSTOSCOPY, BOTOX INJECTION;  Surgeon: Abraham Teague MD;  Location: [facility];  Service: Gynecology    VA INSJ/RPLCMT SPINAL NPG/RCVR POCKET CRTJ&CONNJ Right 2/10/2021    Procedure: REPLACEMENT IMPLANTABLE PULSE GENERATOR DORSAL SPINAL COLUMN STIMULATOR, RIGHT;  Surgeon: Carlos Alberto Jacome MD;  Location: [facility];  Service: Neurosurgery    VA PRQ IMPLTJ NSTIM ELECTRODE ARRAY EPIDURAL Right 2020    Procedure: INSERTION THORACIC DORSAL COLUMN SPINAL CORD STIMULATOR PERCUTANEOUS W IMPLANTABLE PULSE GENERATOR, RIGHT;  Surgeon: Carlos Alberto Jacome MD;  Location: [facility];  Service: Neurosurgery    TONSILLECTOMY      TUBAL LIGATION      UPPER GASTROINTESTINAL ENDOSCOPY  2020       SOCIAL HISTORY:  Social History     Tobacco Use   Smoking Status Never   Smokeless Tobacco Never          PHYSICAL EXAM:  58 y.o. female sitting comfortably on exam chair in no apparent distress.   Difficulty rising from a seated position.   Loss of coronal balance   Patient ambulates  without normal gait, leaning forward, shuffling gait with use of rolling walker.       RADIOGRAPHIC STUDIES:  X-ray, bilateral hips, 4/5/2023: 2 view normal x-rays of the pelvis and hips.  No evidence of significant SI joint abnormality.  Instrumentation down to the level of S1.  CT, abdomen pelvis, 5/25/2023: Evidence of prior extensive fusion from T8-S1 on the left and T9-S1 on the right side.  There is evidence of prior spinal cord placement at the T8-9 level.  There is also evidence of pseudoarthrosis at the upper part of the construct, T8-9 and possibly T9-10.  Upper thoracic spine were not visualized on the CT scan.  There is evidence of anterior interbody fusion at L2-3, L3-4, L4-5 and L5-S1.  There is clear evidence of pseudoarthrosis at L5-S1 both anteriorly and posteriorly.  Bilateral S1 screws are loose.  There is evidence of subsidence anteriorly with screw loosening and significant neuroforaminal stenosis.  X-rays, lumbar spine, 8/26/2024: Multilevel anterior posterior lumbar fusion with instrumentation.  There is evidence of screw loosening and interbody loosening at L5-S1.  Xrays, scoliosis films, 9/03/2024: Extensive thoracolumbar fusion to the level of S1.  There is evidence of deformity focalized to the L5-S1 level with significant sagittal imbalance.      ASSESSMENT:  1. Scoliosis, unspecified scoliosis type, unspecified spinal region  2. Lumbar spine pain  3. History of lumbar surgery  4. Ambulatory dysfunction  5. Pseudoarthrosis of lumbar spine  6. Lumbar radiculopathy        PLAN:  58 y.o. female withlow back pain, lumbar radiculopathy, history of thoracolumbar surgery and ambulatory dysfunction.     Scoliosis films were obtained and reviewed in the office at previous visit. It was discussed that surgical intervention may be performed to correct spinal deformity and preserve function. Surgery will be an extensive procedure involving a posterior approach followed by a possible anterior  approach. Surgery will need to be performed at the [facility] and will be done by both Dr. Stevens and Dr. Bills.  Surgery would involve correction of deformity through a posterior approach.  Unfortunate she has pseudoarthrosis at multilevel anterior interbody fusion.  It is extremely unlikely that the correction can be complete given the extensive anterior approach.  Surgery osteotomy may be required to correct for the deformity.  In the long run she most likely will need anterior column grafted.    Risks, benefits and expectations of surgery were discussed in detail with patient.  All questions were answered.    Surgery has been scheduled for 12/19/2024.  Clearance will be indicated.  The patient will need to be seen within the month surgery for H&P.         Scribe Attestation      I,:  Maninder Ge MA am acting as a scribe while in the presence of the attending physician.:       I,:  Yenni Stevens MD personally performed the services described in this documentation    as scribed in my presence.:

## 2024-11-08 ENCOUNTER — TELEPHONE (OUTPATIENT)
Age: 59
End: 2024-11-08

## 2024-11-11 ENCOUNTER — TELEPHONE (OUTPATIENT)
Age: 59
End: 2024-11-11

## 2024-11-11 NOTE — TELEPHONE ENCOUNTER
Caller: patient    Doctor: Hilda    Reason for call: Patient is in a lot of pain, patient would like to know if she can reschedule her surgery for a sooner date     Call back#: [phone]

## 2024-11-12 ENCOUNTER — OFFICE VISIT (OUTPATIENT)
Dept: PSYCHIATRY | Facility: CLINIC | Age: 59
End: 2024-11-12
Payer: COMMERCIAL

## 2024-11-12 ENCOUNTER — TELEPHONE (OUTPATIENT)
Age: 59
End: 2024-11-12

## 2024-11-12 DIAGNOSIS — F41.0 GENERALIZED ANXIETY DISORDER WITH PANIC ATTACKS: ICD-10-CM

## 2024-11-12 DIAGNOSIS — F43.10 POST TRAUMATIC STRESS DISORDER (PTSD): ICD-10-CM

## 2024-11-12 DIAGNOSIS — F51.05 INSOMNIA DUE TO OTHER MENTAL DISORDER: ICD-10-CM

## 2024-11-12 DIAGNOSIS — F41.1 GENERALIZED ANXIETY DISORDER WITH PANIC ATTACKS: ICD-10-CM

## 2024-11-12 DIAGNOSIS — M79.7 FIBROMYALGIA: ICD-10-CM

## 2024-11-12 DIAGNOSIS — F99 INSOMNIA DUE TO OTHER MENTAL DISORDER: ICD-10-CM

## 2024-11-12 DIAGNOSIS — F33.2 SEVERE EPISODE OF RECURRENT MAJOR DEPRESSIVE DISORDER, WITHOUT PSYCHOTIC FEATURES (HCC): Primary | ICD-10-CM

## 2024-11-12 PROCEDURE — 90792 PSYCH DIAG EVAL W/MED SRVCS: CPT | Performed by: STUDENT IN AN ORGANIZED HEALTH CARE EDUCATION/TRAINING PROGRAM

## 2024-11-12 RX ORDER — MIRTAZAPINE 15 MG/1
15 TABLET, FILM COATED ORAL
Status: SHIPPED
Start: 2024-11-12 | End: 2024-11-12

## 2024-11-12 RX ORDER — CYCLOBENZAPRINE HCL 10 MG
15 TABLET ORAL 3 TIMES DAILY
Status: ON HOLD | COMMUNITY
Start: 2024-10-30

## 2024-11-12 RX ORDER — TRAZODONE HYDROCHLORIDE 50 MG/1
50 TABLET, FILM COATED ORAL
Qty: 90 TABLET | Refills: 0 | Status: ON HOLD | OUTPATIENT
Start: 2024-11-12 | End: 2025-02-10

## 2024-11-12 RX ORDER — PROPRANOLOL HCL 20 MG
20 TABLET ORAL DAILY PRN
Status: ON HOLD | COMMUNITY
Start: 2024-11-03

## 2024-11-12 RX ORDER — BUSPIRONE HYDROCHLORIDE 10 MG/1
20 TABLET ORAL 3 TIMES DAILY
Qty: 540 TABLET | Refills: 0 | Status: ON HOLD | OUTPATIENT
Start: 2024-11-12 | End: 2025-02-10

## 2024-11-12 RX ORDER — SERTRALINE HYDROCHLORIDE 100 MG/1
150 TABLET, FILM COATED ORAL DAILY
Qty: 135 TABLET | Refills: 0 | Status: ON HOLD | OUTPATIENT
Start: 2024-11-12 | End: 2025-02-10

## 2024-11-12 RX ORDER — PREGABALIN 150 MG/1
150 CAPSULE ORAL DAILY
Status: ON HOLD
Start: 2024-11-12 | End: 2024-12-12

## 2024-11-12 RX ORDER — PRAZOSIN HYDROCHLORIDE 2 MG/1
2 CAPSULE ORAL
Status: ON HOLD | COMMUNITY
Start: 2024-10-21

## 2024-11-12 NOTE — TELEPHONE ENCOUNTER
Received transfer call from pt and relayed DENISE Pina's message. She is currently at [facility] and has gained 25lbs and her back brace no longer fits. Is it possible for her to get a larger back brace? Please advise, thanks!

## 2024-11-12 NOTE — BH TREATMENT PLAN
TREATMENT PLAN (Medication Management Only)        [facility]    Name and Date of Birth:  Samantha Rodriguez 58 y.o. 1965  Date of Treatment Plan: November 12, 2024  Diagnosis/Diagnoses:    1. Insomnia due to other mental disorder    2. Generalized anxiety disorder with panic attacks    3. Fibromyalgia      Strengths/Personal Resources for Self-Care: supportive family, supportive friends.  Area/Areas of need (in own words): anxiety, depression  1. Long Term Goal: continue improvement in depression.  Target Date:6 months - 5/12/2025  Person/Persons responsible for completion of goal: Samantha  2.  Short Term Objective (s) - How will we reach this goal?:   A. Provider new recommended medication/dosage changes and/or continue medication(s): continue current medications as prescribed.  B. N/A.  C. N/A.  Target Date:6 months - 5/12/2025  Person/Persons Responsible for Completion of Goal: Samantha  Progress Towards Goals: continuing treatment  Treatment Modality: medication management every 3 months  Review due 180 days from date of this plan: 6 months - 5/12/2025  Expected length of service: maintenance  My Physician/PA/NP and I have developed this plan together and I agree to work on the goals and objectives. I understand the treatment goals that were developed for my treatment.

## 2024-11-12 NOTE — TELEPHONE ENCOUNTER
Called pt and lvm letting her know that she needs to be seen before surgery to do her H&P and to go over preoperative questions

## 2024-11-12 NOTE — PSYCH
PSYCHIATRIC EVALUATION     [facility] - PSYCHIATRIC ASSOCIATES    Name and Date of Birth:  Samantha Rodriguez 58 y.o. 1965 MRN: [id number]    Date of Visit: November 12, 2024    Reason for visit: Initial psychiatric intake assessment    Chief complaint: My last psychiatrist didn't want to prescribe certain medications.    History of Present Illness (HPI):      Samantha Rodriguez is a 58 y.o., female, possessing a previous psychiatric history significant for MDD, AMAURY, medically complicated by fibromyalgia, lumbar radiculopathy/spondylosis, s/p multiple spinal fusion procedures, HTN, GERD, MIMI presenting for intake assessment. Samantha states that she has been struggling with depression and anxiety symptoms for many years - stemming back to approximately age 26.  She reports that she has trouble with periods of depression throughout her life in an episodic fashion.  Finding that when feeling more depressed she has anhedonia, anergia, poor concentration and focus, increased feelings of guilt.  In the past she has had some suicidal ideations although has never attempted suicide.  She reports that she has also struggled with panic attacks and intermittent generalized anxiety characterized by poor focus and concentration, feeling tense and keyed up, excessive worry and rumination.  She reports that more recently her depression has been increased due to various medical conditions.  She suffered multiple spinal injuries due to stenosis and spondylolisthesis which resulted in in her having decreased mobility.  She is in chronic and constant pain and feels that she is no longer able to be as physically active as she once was which is upsetting to her as she previously was an athlete.  She reports she has an upcoming spinal fusion surgery in December and due to chronic pain that has been worsening as of late she is now residing in a rehab center.  She reports that she has also been experiencing  "worsening panic attacks.  These are characterized by by feeling very overwhelmed, crying, yelling having a sense of impending doom with increased tachycardia and palpitations.  She reports that she will also feel very shaky and restless\".  She reports that this occurs a few times per week and will often need assistance from people at the rehab and calm herself down.  In the past she had been taking benzodiazepines, particularly Ativan and Klonopin of which she felt were helpful although her previous doctor stopped this and August due to concurrent use of pain medication morphine.  She reports she does have a history of trauma, witnessed her ex-fiancé dying from a fall and she also and has nightmares and flashbacks of his face when this occurred.  She was also diagnosed in the past with tardive dyskinesia due to lengthy use of antipsychotic medications.  Reports that this was used more so for sleep and anxiety trialing risperidone and Seroquel.  Denies any symptoms consistent of auditory or visual hallucinations in the past.  No paranoia.  No delusions.  No clear manic or hypomanic symptoms although does carry past diagnosis of bipolar disorder.  Denies any SI, HI, AVH presently.  No OCD symptoms.  No history of eating disorder.    Current Rating Scores:     Current PHQ-9   PHQ-2/9 Depression Screening             Psychiatric Review Of Systems:    Appetite: no  Adverse eating: no  Weight changes: no  Insomnia/sleeplessness: decreased  Fatigue/anergy: decreased  Anhedonia/lack of interest: decreased  Attention/concentration: decreased  Psychomotor agitation/retardation: no  Somatic symptoms: no  Anxiety/panic attack: yes, panic attacks, worrying  Janina/hypomania: no  Hopelessness/helplessness/worthlessness: yes  Self-injurious behavior/high-risk behavior: no  Suicidal ideation: no  Homicidal ideation: no  Auditory hallucinations: no  Visual hallucinations: no  Other perceptual disturbances: no  Delusional thinking: " no  Obsessive/compulsive symptoms: no    Review Of Systems:    Constitutional negative   ENT negative   Cardiovascular negative   Respiratory negative   Gastrointestinal negative   Genitourinary negative   Musculoskeletal back pain   Integumentary negative   Neurological negative   Endocrine negative   Other Symptoms none, all other systems are negative       Family Psychiatric History:     Family History   Problem Relation Age of Onset    Colon cancer Mother     Alzheimer's disease Father     Stroke Father     No Known Problems Sister     Colon cancer Brother     Bipolar disorder Brother     No Known Problems Brother     Depression Paternal Grandfather     Breast cancer Maternal Aunt     Colon cancer Maternal Aunt     Seizures Son     Heart disease Other     Diabetes Other     Hypertension Other     Thyroid disease Neg Hx            Past Psychiatric History:     Previous inpatient psychiatric admissions: Total of 6 inpatient admissions.  Previous inpatient/outpatient substance abuse rehabilitation: Denied.  Present/previous outpatient psychiatric treatment: Previously [facility] in the past, currently in rehabilitation psychiatrist at [place].  Present/previous psychotherapy: None currently.  History of suicidal attempts/gestures: Ideations no attempts.  History of violence/aggressive behaviors: Denied.  Present/previous psychotropic medication use: Seroquel, Risperdal, Prozac, prazosin, Zoloft, Ativan, Klonopin.    Substance Abuse History:  Social History     Substance and Sexual Activity   Drug Use No       Nicotine Use: Denied  Alcohol Use: In the past although has not used recently  Cannabis Use: In the past, none currently  Illicit Substance Use: Denied    Social History:    Developmental History: Unknown  Academic history: college graduate  Marital history:   Children: 2 adult sons, 1 adult daughter  Social support system: children  Residential history: Resides in [place]; lives at rehab  presently  Vocational History: on permanent disability  Access to firearms: denies direct access to weapons/firearms.   Legal History: denied    Traumatic History:     Abuse:none is reported   Other Traumatic Events:  witnessed ex-fianace die from a fall    Past Medical History:    Past Medical History:   Diagnosis Date    Anxiety     Arthritis     left knee    Arthritis of right knee 10/6/2020    At risk for falls     Bipolar 2 disorder (HCC)     FOLLOWS WITH PSYCHIATRIST. CONTINUE LAMOTRIGINE; RESOLVED: [id number]    Depression     Familial tremor     both hands    Fibromyalgia     LAST ASSESSED: 08DEC2017    GERD (gastroesophageal reflux disease)     Hearing aid worn     left ear    [place] (hard of hearing)     left ear    Hyperlipidemia     Hypertension     Left-sided weakness     Lumbar disc disease with radiculopathy 2/2/2018    Memory loss of unknown cause     long and short term    Migraine     Multiple closed fractures of metatarsal bone of right foot 5/2/2021    Obesity     Obesity, Class II, BMI 35-39.9     Osteoarthritis of both hips 10/31/2016    Osteoarthritis of knee 2/20/2013    Description: Continue Tylenol and Naproxen. Encourage exercise and weight loss. Patient refused physical therapy. I will refer the patient back to Orthopedics.    Overactive bladder     Panic attack     Patellofemoral disorder of both knees 5/1/2020    Post traumatic stress disorder     Primary localized osteoarthritis of both knees 6/16/2017    Primary osteoarthritis of both knees 5/1/2020    S/P insertion of spinal cord stimulator     no remote    S/P total knee arthroplasty, right 3/10/2022    Sacroiliitis (HCC) 2/28/2017    Seasonal allergies     Seizure-like activity (HCC) 6/3/2022    Seizures (HCC)     possible seizure like activity    Small bowel obstruction (HCC) 3/24/2023    Status post total knee replacement, left 7/5/2022    Stroke (HCC)     questionable stroke 2009    Tardive dyskinesia     PATIENT STATES     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  CONT ASA 81 MG DAILY; RESOLVED: [id number]    Urinary incontinence     Uses walker     Wears dentures     partial lower / full upper- doesnt wear    Wears glasses         Past Surgical History:   Procedure Laterality Date    BACK SURGERY       SECTION      COLONOSCOPY      RESOLVED: 2016    EAR SURGERY      EGD      HYSTERECTOMY  2004    MYRINGOTOMY W/ TUBES Left     NECK SURGERY  2019    CO ARTHRP KNE CONDYLE&PLATU MEDIAL&LAT COMPARTMENTS Right 3/9/2022    Procedure: ARTHROPLASTY KNEE TOTAL;  Surgeon: Chandni Tuttle DO;  Location[facility];  Service: Orthopedics    CO ARTHRP KNE CONDYLE&PLATU MEDIAL&LAT COMPARTMENTS Left 2022    Procedure: ARTHROPLASTY KNEE TOTAL;  Surgeon: Chandni Tuttle DO;  Location[facility];  Service: Orthopedics    CO CYSTOURETHROSCOPY N/A 2016    Procedure: CYSTOSCOPY, BOTOX INJECTION;  Surgeon: Abraham Teague MD;  Location: [facility];  Service: Gynecology    CO INSJ/RPLCMT SPINAL NPG/RCVR POCKET CRTJ&CONNJ Right 2/10/2021    Procedure: REPLACEMENT IMPLANTABLE PULSE GENERATOR DORSAL SPINAL COLUMN STIMULATOR, RIGHT;  Surgeon: Carlos Alberto Jacome MD;  Location: [facility];  Service: Neurosurgery    CO PRQ IMPLTJ NSTIM ELECTRODE ARRAY EPIDURAL Right 2020    Procedure: INSERTION THORACIC DORSAL COLUMN SPINAL CORD STIMULATOR PERCUTANEOUS W IMPLANTABLE PULSE GENERATOR, RIGHT;  Surgeon: Carlos Alberto Jacome MD;  Location: [facility];  Service: Neurosurgery    TONSILLECTOMY      TUBAL LIGATION      UPPER GASTROINTESTINAL ENDOSCOPY  2020     No Known Allergies    History Review:    The following portions of the patient's history were reviewed and updated as appropriate: allergies, current medications, past family history, past medical history, past social history, past surgical history, and problem list.    OBJECTIVE:    Vital signs in last 24 hours:    There were no vitals filed for  this visit.    Mental Status Evaluation:    Appearance age appropriate, casually dressed, ambulates with walker; edentulous   Behavior cooperative, calm   Speech normal rate, normal volume, normal pitch   Mood depressed, anxious   Affect constricted   Thought Processes organized, goal directed   Associations intact associations   Thought Content no overt delusions, negative thoughts, ruminations   Perceptual Disturbances: no auditory hallucinations, no visual hallucinations   Abnormal Thoughts  Risk Potential Suicidal ideation - None  Homicidal ideation - None  Potential for aggression - No   Orientation oriented to person, place, time/date, and situation   Memory recent and remote memory grossly intact   Consciousness alert and awake   Attention Span Concentration Span attention span and concentration are age appropriate   Intellect appears to be of average intelligence   Insight intact   Judgement intact   Muscle Strength and  Gait normal muscle strength and normal muscle tone, normal gait and normal balance   Motor Activity no abnormal movements   Language no difficulty naming common objects, no difficulty repeating a phrase, no difficulty writing a sentence   Fund of Knowledge adequate knowledge of current events  adequate fund of knowledge regarding past history  adequate fund of knowledge regarding vocabulary    Pain none   Pain Scale 0       Laboratory Results: I have personally reviewed all pertinent laboratory/tests results    Recent Labs (last 4 months):   No visits with results within 4 Month(s) from this visit.   Latest known visit with results is:   Hospital Outpatient Visit on 03/20/2024   Component Date Value    Case Report 03/20/2024                      Value:Surgical Pathology Report                         Case: [id number]                                  Authorizing Provider:  Kasey Rhodes DO  Collected:           03/20/2024 0956              Ordering Location:     [facility]  S[facility] Received:            03/20/2024 1424                                     Heart Endoscopy                                                              Pathologist:           Fernando Coles MD                                                             Specimens:   A) - Stomach, bx, r/o h pylori                                                                      B) - Colon, cold snare, transverse polyp x2                                                Final Diagnosis 03/20/2024                      Value:A. Stomach, bx, r/o h pylori:                          - Gastric oxyntic and antral type mucosa with minimal or mild chronic                           inflammation                          - No rudy shaped bacteria seen on H&E stained tissue section                          - Negative for intestinal metaplasia and dysplasia                                                     B. Colon, cold snare, transverse polyp x2:                          - Focal mild adenomatous change                              Additional Information 03/20/2024                      Value:All reported additional testing was performed with appropriately reactive                           controls.  These tests were developed and their performance                           characteristics determined by [facility] or                           appropriate performing facility, though some tests may be performed on                           tissues which have not been validated for performance characteristics                           (such as staining performed on alcohol exposed cell blocks and decalcified                           tissues).  Results should be interpreted with caution and in the context                           of the patients’ clinical condition. These tests may not be cleared or                           approved by the U.S. Food and Drug Administration, though the FDA has                            "determined that such clearance or approval is not necessary. These tests                           are used for clinical purposes and they should not be regarded as                           investigational or for research. This laboratory has been approved by CLIA                           88, designated as a high-complexity laboratory and is qualified to perform                           these tests.                          .    Synoptic Checklist 03/20/2024                      Value:                            COLON/RECTUM POLYP FORM - GI - All Specimens                                                                                     :    Adenoma(s)      Gross Description 03/20/2024                      Value:A. The specimen is received in formalin, labeled with the patient's name                           and hospital number, and is designated \" stomach biopsy rule out H.                           pylori\".  It consists of two 0.3 cm tan-white tissue fragments.  Entirely                           submitted in a screen cassette.                          B. The specimen is received in formalin, labeled with the patient's name                           and hospital number, and is designated \" cold snare transverse polyp x 2\".                            It consists of 3 0.2 cm - 1.2 cm tan-pink elongated tissue fragments.                            Entirely submitted in a screen cassette.                                                    Note: The estimated total formalin fixation time based upon information                           provided by the submitting clinician and the standard processing schedule                           is under 72 hours.  [facility]       Suicide/Homicide Risk Assessment:    Risk of Harm to Self:  The following ratings are based on assessment at the time of the interview  Demographic risk factors include:   Historical Risk Factors include: chronic depressive symptoms, " history of anxiety  Recent Specific Risk Factors include: diagnosis of depression, current anxiety symptoms, significant anxiety symptoms  Protective Factors: no current suicidal ideation, ability to adapt to change, able to manage anger well, access to mental health treatment, being a parent, compliant with medications, compliant with mental health treatment, connection to community, effective coping skills, effective decision-making skills, effective problem solving skills  Weapons: none. The following steps have been taken to ensure weapons are properly secured: not applicable  Based on today's assessment, Samantha presents the following risk of harm to self: minimal    Risk of Harm to Others:  The following ratings are based on assessment at the time of the interview  Demographic Risk Factors include: none.  Historical Risk Factors include: none.  Recent Specific Risk Factors include: none.  Protective Factors: no current homicidal ideation, able to manage anger well, access to mental health treatment, being a parent, compliant with medications, connection to own children, cultural beliefs, effective coping skills, effective decision-making skills, effective problem solving skills  Weapons: none. The following steps have been taken to ensure weapons are properly secured: not applicable  Based on today's assessment, Samantha presents the following risk of harm to others: minimal    The following interventions are recommended: no intervention changes needed. Although patient's acute lethality risk is low, long-term/chronic lethality risk is mildly elevated in the presence of MDD, AMAURY. At the current moment, Samantha is future-oriented, forward-thinking, and demonstrates ability to act in a self-preserving manner as evidenced by volitionally presenting to the clinic today, seeking treatment.To mitigate future risk, patient should adhere to the recommendations of this writer, avoid alcohol/illicit substance use, utilize  community-based resources and familiar support and prioritize mental health treatment.     Presently, patient denies suicidal/homicidal ideation in addition to thoughts of self-injury; contracts for safety, see below for risk assessment. At conclusion of evaluation, patient is amenable to the recommendations of this writer including: medication management.  Also, patient is amenable to calling/contacting the outpatient office including this writer if any acute adverse effects of their medication regimen arise in addition to any comments or concerns pertaining to their psychiatric management.  Patient is amenable to calling/contacting crisis and/or attending to the nearest emergency department if their clinical condition deteriorates to assure their safety and stability, stating that they are able to appropriately confide in their children regarding their psychiatric state.      Summary:  Samantha Rodriguez is a 58 y.o., female, possessing a previous psychiatric history significant for MDD, AMAURY, medically complicated by fibromyalgia, lumbar radiculopathy/spondylosis, s/p multiple spinal fusion procedures, HTN, GERD, MIMI presenting for intake assessment. She has had intermittent treatment of depression and anxiety throughout her lifetime. Anxiety symptoms and panic attacks worsened recently due to moving to [facility] until pending spinal fusion surgery in December. She also struggles with increased depression related to her loss of mobility and independence related to her various back conditions and pain.    11/12/24 We discussed avoiding benzodiazepines in treating anxiety symptoms due to propensity for causing with respiratory depression in the setting of concurrent use of opioid medications.  Discussed benefit from further increasing sertraline to target anxiety and depression symptoms as well as increasing BuSpar to assist with anxiety.  She does not feel the Remeron is particularly impactful for targeting  sleep and was in agreement to discontinuing this and switching in the level of trazodone.    Assessment & Plan  Severe episode of recurrent major depressive disorder, without psychotic features (HCC)    Orders:    sertraline (ZOLOFT) 100 mg tablet; Take 1.5 tablets (150 mg total) by mouth daily    Generalized anxiety disorder with panic attacks    Orders:    sertraline (ZOLOFT) 100 mg tablet; Take 1.5 tablets (150 mg total) by mouth daily    busPIRone (BUSPAR) 10 mg tablet; Take 2 tablets (20 mg total) by mouth 3 (three) times a day    Fibromyalgia    Orders:    pregabalin (LYRICA) 150 mg capsule; Take 1 capsule (150 mg total) by mouth daily    Insomnia due to other mental disorder    Orders:    traZODone (DESYREL) 50 mg tablet; Take 1 tablet (50 mg total) by mouth daily at bedtime    Post traumatic stress disorder (PTSD)             Additonal Recommendations/Precautions:    Aware of need to follow up with family physician for medical issues  Aware of 24 hour and weekend coverage for urgent situations accessed by calling [facility] main practice number    Medications Risks/Benefits:      Risks, Benefits And Possible Side Effects Of Medications:    Risks, benefits, and possible side effects of medications explained to Samantha including risk of suicidality and serotonin syndrome related to treatment with antidepressants. She verbalizes understanding and agreement for treatment..    Controlled Medication Discussion:     Samantha has been filling controlled prescriptions on time as prescribed according to [place] Prescription Drug Monitoring Program    Treatment Plan:    Completed and signed during the session: Yes - with Samantha    This note was not shared with the patient due to reasonable likelihood of causing patient harm    Visit Time    Visit Start Time: 200pm  Visit Stop Time: 300pm  Total Visit Duration:  60 minutes    Arron Canseco MD 11/12/24

## 2024-11-16 ENCOUNTER — HOSPITAL ENCOUNTER (INPATIENT)
Facility: HOSPITAL | Age: 59
LOS: 14 days | Discharge: NURSING, REHAB OR LONG-TERM CARE | DRG: 885 | End: 2024-12-02
Attending: EMERGENCY MEDICINE | Admitting: PSYCHIATRY & NEUROLOGY
Payer: COMMERCIAL

## 2024-11-16 DIAGNOSIS — G89.4 CHRONIC PAIN DISORDER: ICD-10-CM

## 2024-11-16 DIAGNOSIS — I10 ESSENTIAL HYPERTENSION: ICD-10-CM

## 2024-11-16 DIAGNOSIS — K21.9 GERD WITHOUT ESOPHAGITIS: ICD-10-CM

## 2024-11-16 DIAGNOSIS — F41.0 GENERALIZED ANXIETY DISORDER WITH PANIC ATTACKS: ICD-10-CM

## 2024-11-16 DIAGNOSIS — R45.851 SUICIDAL IDEATION: ICD-10-CM

## 2024-11-16 DIAGNOSIS — Z00.8 MEDICAL CLEARANCE FOR PSYCHIATRIC ADMISSION: ICD-10-CM

## 2024-11-16 DIAGNOSIS — E78.2 MIXED HYPERLIPIDEMIA: ICD-10-CM

## 2024-11-16 DIAGNOSIS — F41.1 GENERALIZED ANXIETY DISORDER WITH PANIC ATTACKS: ICD-10-CM

## 2024-11-16 DIAGNOSIS — F43.10 POST TRAUMATIC STRESS DISORDER (PTSD): ICD-10-CM

## 2024-11-16 DIAGNOSIS — Z86.73 HISTORY OF CVA (CEREBROVASCULAR ACCIDENT): ICD-10-CM

## 2024-11-16 DIAGNOSIS — G47.33 OSA (OBSTRUCTIVE SLEEP APNEA): ICD-10-CM

## 2024-11-16 DIAGNOSIS — M47.816 LUMBAR SPONDYLOSIS: ICD-10-CM

## 2024-11-16 DIAGNOSIS — M48.062 SPINAL STENOSIS OF LUMBAR REGION WITH NEUROGENIC CLAUDICATION: ICD-10-CM

## 2024-11-16 DIAGNOSIS — E55.9 VITAMIN D DEFICIENCY: ICD-10-CM

## 2024-11-16 DIAGNOSIS — F41.9 ANXIETY: ICD-10-CM

## 2024-11-16 DIAGNOSIS — R13.10 DYSPHAGIA, UNSPECIFIED TYPE: ICD-10-CM

## 2024-11-16 DIAGNOSIS — M96.1 POST LAMINECTOMY SYNDROME: ICD-10-CM

## 2024-11-16 DIAGNOSIS — G24.01 TARDIVE DYSKINESIA: ICD-10-CM

## 2024-11-16 DIAGNOSIS — M54.16 LUMBAR RADICULOPATHY: ICD-10-CM

## 2024-11-16 DIAGNOSIS — K59.03 OPIOID-INDUCED CONSTIPATION: ICD-10-CM

## 2024-11-16 DIAGNOSIS — F33.2 SEVERE EPISODE OF RECURRENT MAJOR DEPRESSIVE DISORDER, WITHOUT PSYCHOTIC FEATURES (HCC): Primary | ICD-10-CM

## 2024-11-16 DIAGNOSIS — T40.2X5A OPIOID-INDUCED CONSTIPATION: ICD-10-CM

## 2024-11-16 DIAGNOSIS — T78.40XA ALLERGIES: ICD-10-CM

## 2024-11-16 LAB
ALBUMIN SERPL BCG-MCNC: 4.6 G/DL (ref 3.5–5)
ALP SERPL-CCNC: 101 U/L (ref 34–104)
ALT SERPL W P-5'-P-CCNC: 17 U/L (ref 7–52)
AMPHETAMINES SERPL QL SCN: NEGATIVE
ANION GAP SERPL CALCULATED.3IONS-SCNC: 13 MMOL/L (ref 4–13)
AST SERPL W P-5'-P-CCNC: 22 U/L (ref 13–39)
BACTERIA UR QL AUTO: ABNORMAL /HPF
BARBITURATES UR QL: NEGATIVE
BASOPHILS # BLD AUTO: 0.03 THOUSANDS/ÂΜL (ref 0–0.1)
BASOPHILS NFR BLD AUTO: 1 % (ref 0–1)
BENZODIAZ UR QL: NEGATIVE
BILIRUB SERPL-MCNC: 1.39 MG/DL (ref 0.2–1)
BILIRUB UR QL STRIP: NEGATIVE
BUN SERPL-MCNC: 12 MG/DL (ref 5–25)
CALCIUM SERPL-MCNC: 9.9 MG/DL (ref 8.4–10.2)
CHLORIDE SERPL-SCNC: 103 MMOL/L (ref 96–108)
CLARITY UR: CLEAR
CO2 SERPL-SCNC: 22 MMOL/L (ref 21–32)
COCAINE UR QL: NEGATIVE
COLOR UR: ABNORMAL
CREAT SERPL-MCNC: 0.65 MG/DL (ref 0.6–1.3)
EOSINOPHIL # BLD AUTO: 0.06 THOUSAND/ÂΜL (ref 0–0.61)
EOSINOPHIL NFR BLD AUTO: 1 % (ref 0–6)
ERYTHROCYTE [DISTWIDTH] IN BLOOD BY AUTOMATED COUNT: 13.1 % (ref 11.6–15.1)
ETHANOL EXG-MCNC: 0 MG/DL
ETHANOL SERPL-MCNC: <10 MG/DL
FENTANYL UR QL SCN: NEGATIVE
GFR SERPL CREATININE-BSD FRML MDRD: 96 ML/MIN/1.73SQ M
GLUCOSE SERPL-MCNC: 107 MG/DL (ref 65–140)
GLUCOSE UR STRIP-MCNC: NEGATIVE MG/DL
HCT VFR BLD AUTO: 42.8 % (ref 34.8–46.1)
HGB BLD-MCNC: 14.7 G/DL (ref 11.5–15.4)
HGB UR QL STRIP.AUTO: 50
HYDROCODONE UR QL SCN: NEGATIVE
IMM GRANULOCYTES # BLD AUTO: 0.02 THOUSAND/UL (ref 0–0.2)
IMM GRANULOCYTES NFR BLD AUTO: 0 % (ref 0–2)
KETONES UR STRIP-MCNC: NEGATIVE MG/DL
LEUKOCYTE ESTERASE UR QL STRIP: 25
LYMPHOCYTES # BLD AUTO: 1.16 THOUSANDS/ÂΜL (ref 0.6–4.47)
LYMPHOCYTES NFR BLD AUTO: 22 % (ref 14–44)
MCH RBC QN AUTO: 29.6 PG (ref 26.8–34.3)
MCHC RBC AUTO-ENTMCNC: 34.3 G/DL (ref 31.4–37.4)
MCV RBC AUTO: 86 FL (ref 82–98)
METHADONE UR QL: NEGATIVE
MONOCYTES # BLD AUTO: 0.34 THOUSAND/ÂΜL (ref 0.17–1.22)
MONOCYTES NFR BLD AUTO: 7 % (ref 4–12)
NEUTROPHILS # BLD AUTO: 3.6 THOUSANDS/ÂΜL (ref 1.85–7.62)
NEUTS SEG NFR BLD AUTO: 69 % (ref 43–75)
NITRITE UR QL STRIP: NEGATIVE
NON-SQ EPI CELLS URNS QL MICRO: ABNORMAL /HPF
NRBC BLD AUTO-RTO: 0 /100 WBCS
OPIATES UR QL SCN: POSITIVE
OXYCODONE+OXYMORPHONE UR QL SCN: NEGATIVE
PCP UR QL: NEGATIVE
PH UR STRIP.AUTO: 8 [PH]
PLATELET # BLD AUTO: 242 THOUSANDS/UL (ref 149–390)
PMV BLD AUTO: 9.5 FL (ref 8.9–12.7)
POTASSIUM SERPL-SCNC: 3.4 MMOL/L (ref 3.5–5.3)
PROT SERPL-MCNC: 7.7 G/DL (ref 6.4–8.4)
PROT UR STRIP-MCNC: ABNORMAL MG/DL
RBC # BLD AUTO: 4.97 MILLION/UL (ref 3.81–5.12)
RBC #/AREA URNS AUTO: ABNORMAL /HPF
SODIUM SERPL-SCNC: 138 MMOL/L (ref 135–147)
SP GR UR STRIP.AUTO: 1.01 (ref 1–1.04)
THC UR QL: NEGATIVE
TSH SERPL DL<=0.05 MIU/L-ACNC: 1.76 UIU/ML (ref 0.45–4.5)
UROBILINOGEN UA: NEGATIVE MG/DL
WBC # BLD AUTO: 5.21 THOUSAND/UL (ref 4.31–10.16)
WBC #/AREA URNS AUTO: ABNORMAL /HPF

## 2024-11-16 PROCEDURE — 80307 DRUG TEST PRSMV CHEM ANLYZR: CPT | Performed by: EMERGENCY MEDICINE

## 2024-11-16 PROCEDURE — 82746 ASSAY OF FOLIC ACID SERUM: CPT

## 2024-11-16 PROCEDURE — 82306 VITAMIN D 25 HYDROXY: CPT

## 2024-11-16 PROCEDURE — 96375 TX/PRO/DX INJ NEW DRUG ADDON: CPT

## 2024-11-16 PROCEDURE — 36415 COLL VENOUS BLD VENIPUNCTURE: CPT | Performed by: EMERGENCY MEDICINE

## 2024-11-16 PROCEDURE — 81001 URINALYSIS AUTO W/SCOPE: CPT | Performed by: EMERGENCY MEDICINE

## 2024-11-16 PROCEDURE — 80053 COMPREHEN METABOLIC PANEL: CPT | Performed by: EMERGENCY MEDICINE

## 2024-11-16 PROCEDURE — 99285 EMERGENCY DEPT VISIT HI MDM: CPT | Performed by: EMERGENCY MEDICINE

## 2024-11-16 PROCEDURE — 81003 URINALYSIS AUTO W/O SCOPE: CPT | Performed by: EMERGENCY MEDICINE

## 2024-11-16 PROCEDURE — 96374 THER/PROPH/DIAG INJ IV PUSH: CPT

## 2024-11-16 PROCEDURE — 93005 ELECTROCARDIOGRAM TRACING: CPT

## 2024-11-16 PROCEDURE — 82077 ASSAY SPEC XCP UR&BREATH IA: CPT | Performed by: EMERGENCY MEDICINE

## 2024-11-16 PROCEDURE — 99285 EMERGENCY DEPT VISIT HI MDM: CPT

## 2024-11-16 PROCEDURE — 84443 ASSAY THYROID STIM HORMONE: CPT | Performed by: EMERGENCY MEDICINE

## 2024-11-16 PROCEDURE — 85025 COMPLETE CBC W/AUTO DIFF WBC: CPT | Performed by: EMERGENCY MEDICINE

## 2024-11-16 PROCEDURE — 82075 ASSAY OF BREATH ETHANOL: CPT | Performed by: EMERGENCY MEDICINE

## 2024-11-16 RX ORDER — PROPRANOLOL HCL 20 MG
20 TABLET ORAL DAILY PRN
Status: DISCONTINUED | OUTPATIENT
Start: 2024-11-16 | End: 2024-11-18

## 2024-11-16 RX ORDER — METHOCARBAMOL 500 MG/1
500 TABLET, FILM COATED ORAL 4 TIMES DAILY
Status: DISCONTINUED | OUTPATIENT
Start: 2024-11-16 | End: 2024-11-18

## 2024-11-16 RX ORDER — ATORVASTATIN CALCIUM 40 MG/1
40 TABLET, FILM COATED ORAL
Status: DISCONTINUED | OUTPATIENT
Start: 2024-11-17 | End: 2024-11-18

## 2024-11-16 RX ORDER — ASPIRIN 81 MG/1
81 TABLET ORAL DAILY
Status: DISCONTINUED | OUTPATIENT
Start: 2024-11-17 | End: 2024-11-18

## 2024-11-16 RX ORDER — TRAZODONE HYDROCHLORIDE 50 MG/1
50 TABLET, FILM COATED ORAL
Status: DISCONTINUED | OUTPATIENT
Start: 2024-11-16 | End: 2024-11-18

## 2024-11-16 RX ORDER — PRAZOSIN HYDROCHLORIDE 1 MG/1
2 CAPSULE ORAL
Status: DISCONTINUED | OUTPATIENT
Start: 2024-11-16 | End: 2024-11-18

## 2024-11-16 RX ORDER — BUSPIRONE HYDROCHLORIDE 10 MG/1
20 TABLET ORAL 3 TIMES DAILY
Status: DISCONTINUED | OUTPATIENT
Start: 2024-11-16 | End: 2024-11-18

## 2024-11-16 RX ORDER — HYDROMORPHONE HCL/PF 1 MG/ML
0.5 SYRINGE (ML) INJECTION ONCE
Refills: 0 | Status: COMPLETED | OUTPATIENT
Start: 2024-11-16 | End: 2024-11-16

## 2024-11-16 RX ORDER — LORAZEPAM 2 MG/ML
0.5 INJECTION INTRAMUSCULAR ONCE
Status: COMPLETED | OUTPATIENT
Start: 2024-11-16 | End: 2024-11-16

## 2024-11-16 RX ORDER — CYCLOBENZAPRINE HCL 10 MG
10 TABLET ORAL 3 TIMES DAILY
Status: DISCONTINUED | OUTPATIENT
Start: 2024-11-16 | End: 2024-11-18

## 2024-11-16 RX ORDER — AMLODIPINE BESYLATE 5 MG/1
5 TABLET ORAL DAILY
Status: DISCONTINUED | OUTPATIENT
Start: 2024-11-17 | End: 2024-11-18

## 2024-11-16 RX ADMIN — PRAZOSIN HYDROCHLORIDE 2 MG: 1 CAPSULE ORAL at 23:46

## 2024-11-16 RX ADMIN — CYCLOBENZAPRINE HYDROCHLORIDE 10 MG: 10 TABLET, FILM COATED ORAL at 23:53

## 2024-11-16 RX ADMIN — LORAZEPAM 0.5 MG: 2 INJECTION INTRAMUSCULAR; INTRAVENOUS at 22:20

## 2024-11-16 RX ADMIN — HYDROMORPHONE HYDROCHLORIDE 0.5 MG: 1 INJECTION, SOLUTION INTRAMUSCULAR; INTRAVENOUS; SUBCUTANEOUS at 22:22

## 2024-11-16 RX ADMIN — BUSPIRONE HYDROCHLORIDE 20 MG: 10 TABLET ORAL at 23:46

## 2024-11-16 RX ADMIN — METHOCARBAMOL TABLETS 500 MG: 500 TABLET, COATED ORAL at 23:53

## 2024-11-16 RX ADMIN — TRAZODONE HYDROCHLORIDE 50 MG: 50 TABLET ORAL at 23:53

## 2024-11-16 NOTE — LETTER
ID # [id number]    [facility] # [id number]    [place] Phone # [phone]    DISCHARGE SUMMARY

## 2024-11-17 LAB
ATRIAL RATE: 61 BPM
P AXIS: 55 DEGREES
PR INTERVAL: 136 MS
QRS AXIS: 58 DEGREES
QRSD INTERVAL: 100 MS
QT INTERVAL: 466 MS
QTC INTERVAL: 470 MS
T WAVE AXIS: 26 DEGREES
VENTRICULAR RATE: 61 BPM

## 2024-11-17 PROCEDURE — 93010 ELECTROCARDIOGRAM REPORT: CPT | Performed by: INTERNAL MEDICINE

## 2024-11-17 PROCEDURE — 96372 THER/PROPH/DIAG INJ SC/IM: CPT

## 2024-11-17 RX ORDER — MORPHINE SULFATE 4 MG/ML
4 INJECTION, SOLUTION INTRAMUSCULAR; INTRAVENOUS ONCE
Status: COMPLETED | OUTPATIENT
Start: 2024-11-17 | End: 2024-11-17

## 2024-11-17 RX ORDER — MORPHINE SULFATE 4 MG/ML
4 INJECTION, SOLUTION INTRAMUSCULAR; INTRAVENOUS ONCE
Status: DISCONTINUED | OUTPATIENT
Start: 2024-11-17 | End: 2024-11-17

## 2024-11-17 RX ORDER — MORPHINE SULFATE 15 MG/1
15 TABLET ORAL EVERY 6 HOURS PRN
Refills: 0 | Status: DISCONTINUED | OUTPATIENT
Start: 2024-11-17 | End: 2024-12-02 | Stop reason: HOSPADM

## 2024-11-17 RX ORDER — LORAZEPAM 1 MG/1
2 TABLET ORAL ONCE
Status: COMPLETED | OUTPATIENT
Start: 2024-11-17 | End: 2024-11-17

## 2024-11-17 RX ADMIN — METHOCARBAMOL TABLETS 500 MG: 500 TABLET, COATED ORAL at 12:16

## 2024-11-17 RX ADMIN — PRAZOSIN HYDROCHLORIDE 2 MG: 1 CAPSULE ORAL at 22:04

## 2024-11-17 RX ADMIN — BUSPIRONE HYDROCHLORIDE 20 MG: 10 TABLET ORAL at 22:04

## 2024-11-17 RX ADMIN — PROPRANOLOL HYDROCHLORIDE 20 MG: 20 TABLET ORAL at 08:51

## 2024-11-17 RX ADMIN — BUSPIRONE HYDROCHLORIDE 20 MG: 10 TABLET ORAL at 08:33

## 2024-11-17 RX ADMIN — PREGABALIN 150 MG: 25 CAPSULE ORAL at 08:51

## 2024-11-17 RX ADMIN — MORPHINE SULFATE 4 MG: 4 INJECTION, SOLUTION INTRAMUSCULAR; INTRAVENOUS at 17:11

## 2024-11-17 RX ADMIN — ASPIRIN 81 MG: 81 TABLET, COATED ORAL at 08:33

## 2024-11-17 RX ADMIN — CYCLOBENZAPRINE HYDROCHLORIDE 10 MG: 10 TABLET, FILM COATED ORAL at 17:06

## 2024-11-17 RX ADMIN — TRAZODONE HYDROCHLORIDE 50 MG: 50 TABLET ORAL at 22:04

## 2024-11-17 RX ADMIN — METHOCARBAMOL TABLETS 500 MG: 500 TABLET, COATED ORAL at 22:04

## 2024-11-17 RX ADMIN — BUSPIRONE HYDROCHLORIDE 20 MG: 10 TABLET ORAL at 17:06

## 2024-11-17 RX ADMIN — SERTRALINE HYDROCHLORIDE 150 MG: 50 TABLET ORAL at 08:33

## 2024-11-17 RX ADMIN — CYCLOBENZAPRINE HYDROCHLORIDE 10 MG: 10 TABLET, FILM COATED ORAL at 22:04

## 2024-11-17 RX ADMIN — METHOCARBAMOL TABLETS 500 MG: 500 TABLET, COATED ORAL at 08:33

## 2024-11-17 RX ADMIN — ATORVASTATIN CALCIUM 40 MG: 20 TABLET, FILM COATED ORAL at 17:06

## 2024-11-17 RX ADMIN — CYCLOBENZAPRINE HYDROCHLORIDE 10 MG: 10 TABLET, FILM COATED ORAL at 08:33

## 2024-11-17 RX ADMIN — LORAZEPAM 2 MG: 1 TABLET ORAL at 22:59

## 2024-11-17 RX ADMIN — METHOCARBAMOL TABLETS 500 MG: 500 TABLET, COATED ORAL at 17:06

## 2024-11-17 RX ADMIN — AMLODIPINE BESYLATE 5 MG: 5 TABLET ORAL at 08:33

## 2024-11-17 NOTE — ED NOTES
Inpatient process, patient's rights, and bed search process explained to the patient at this time. Patient verbalizes understanding and is agreeable to admission to IP unit.     Voluntary commitment forms (201) signed by both the patient and ED attending, Dr. Robby MD, at this time. Original placed with the patient's chart, copy located in crisis office. The patient does not have a preference at this time as to where she is admitted but will likely need a network bed due to medical acuity.     201 has been emailed to [facility] intake at [email or web address] via ED fax machine for morning review.
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
## 2024-11-17 NOTE — ED PROVIDER NOTES
Time reflects when diagnosis was documented in both MDM as applicable and the Disposition within this note       Time User Action Codes Description Comment    11/16/2024 10:01 PM Rikki Arango [F41.9] Anxiety     11/16/2024 10:01 PM Rikki Arango [R45.851] Suicidal ideation           ED Disposition       ED Disposition   Transfer to Behavioral Health Condition   --    Date/Time   Sat Nov 16, 2024 10:01 PM    Comment   Samantha Rodriguez should be transferred out to  and has been medically cleared.               Assessment & Plan       Medical Decision Making  58-year-old female presents with generalized bodyaches, panic attack, suicidal ideation  Patient states that her pain is her chronic pain but slightly worsened.  Her home medication has not been sufficient to address her pain  Due to the worsening panic attacks and patient's endorsement of suicidal ideation will obtain behavioral health labs as well as crisis consult  Due to pain being not new and being her chronic pain we will address the pain with pain medication as well as anxiolytic due to her panic attack      Amount and/or Complexity of Data Reviewed  Labs: ordered.             Medications   amLODIPine (NORVASC) tablet 5 mg (5 mg Oral Given 11/17/24 0833)   aspirin (ECOTRIN LOW STRENGTH) EC tablet 81 mg (81 mg Oral Given 11/17/24 0833)   atorvastatin (LIPITOR) tablet 40 mg (has no administration in time range)   busPIRone (BUSPAR) tablet 20 mg (20 mg Oral Given 11/17/24 0833)   cyclobenzaprine (FLEXERIL) tablet 10 mg (10 mg Oral Given 11/17/24 0833)   methocarbamol (ROBAXIN) tablet 500 mg (500 mg Oral Given 11/17/24 1216)   prazosin (MINIPRESS) capsule 2 mg (2 mg Oral Given 11/16/24 2346)   pregabalin (LYRICA) capsule 150 mg (150 mg Oral Given 11/17/24 0851)   propranolol (INDERAL) tablet 20 mg (20 mg Oral Given 11/17/24 0851)   sertraline (ZOLOFT) tablet 150 mg (150 mg Oral Given 11/17/24 0833)   traZODone (DESYREL) tablet 50 mg (50 mg Oral Given  11/16/24 2353)   HYDROmorphone (DILAUDID) injection 0.5 mg (0.5 mg Intravenous Given 11/16/24 2222)   LORazepam (ATIVAN) injection 0.5 mg (0.5 mg Intravenous Given 11/16/24 2220)       ED Risk Strat Scores                           SBIRT 20yo+      Flowsheet Row Most Recent Value   Initial Alcohol Screen: US AUDIT-C     1. How often do you have a drink containing alcohol? 0 Filed at: 11/16/2024 2140   2. How many drinks containing alcohol do you have on a typical day you are drinking?  0 Filed at: 11/16/2024 2140   3b. FEMALE Any Age, or MALE 65+: How often do you have 4 or more drinks on one occassion? 0 Filed at: 11/16/2024 2140   Audit-C Score 0 Filed at: 11/16/2024 2140   CAMRON: How many times in the past year have you...    Used an illegal drug or used a prescription medication for non-medical reasons? Never Filed at: 11/16/2024 2140                            History of Present Illness       Chief Complaint   Patient presents with    Abdominal Pain     Arrives with [facility] from a rehab home. Left sided abd pain since today, also c/o all 4 extrem pain, scheduled spinal surgery dec 19, says that she is having difficulty using the bathroom in the morning and at night. States she took morphine at 7pm. Pt reports high anxiety d/t the pain.     Anxiety       Past Medical History:   Diagnosis Date    Anxiety     Arthritis     left knee    Arthritis of right knee 10/6/2020    At risk for falls     Bipolar 2 disorder (HCC)     FOLLOWS WITH PSYCHIATRIST. CONTINUE LAMOTRIGINE; RESOLVED: [id number]    Depression     Familial tremor     both hands    Fibromyalgia     LAST ASSESSED: 08DEC2017    GERD (gastroesophageal reflux disease)     Hearing aid worn     left ear    [place] (hard of hearing)     left ear    Hyperlipidemia     Hypertension     Left-sided weakness     Lumbar disc disease with radiculopathy 2/2/2018    Memory loss of unknown cause     long and short term    Migraine     Multiple closed fractures of metatarsal bone  of right foot 2021    Obesity     Obesity, Class II, BMI 35-39.9     Osteoarthritis of both hips 10/31/2016    Osteoarthritis of knee 2013    Description: Continue Tylenol and Naproxen. Encourage exercise and weight loss. Patient refused physical therapy. I will refer the patient back to Orthopedics.    Overactive bladder     Panic attack     Patellofemoral disorder of both knees 2020    Post traumatic stress disorder     Primary localized osteoarthritis of both knees 2017    Primary osteoarthritis of both knees 2020    S/P insertion of spinal cord stimulator     no remote    S/P total knee arthroplasty, right 3/10/2022    Sacroiliitis (HCC) 2017    Seasonal allergies     Seizure-like activity (HCC) 6/3/2022    Seizures (HCC)     possible seizure like activity    Small bowel obstruction (HCC) 3/24/2023    Status post total knee replacement, left 2022    Stroke (HCC)     questionable stroke     Tardive dyskinesia     PATIENT STATES    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  CONT ASA 81 MG DAILY; RESOLVED: [id number]    Urinary incontinence     Uses walker     Wears dentures     partial lower / full upper- doesnt wear    Wears glasses       Past Surgical History:   Procedure Laterality Date    BACK SURGERY       SECTION      COLONOSCOPY      RESOLVED: 2016    EAR SURGERY      EGD      HYSTERECTOMY  2004    MYRINGOTOMY W/ TUBES Left     NECK SURGERY  2019    SC ARTHRP KNE CONDYLE&PLATU MEDIAL&LAT COMPARTMENTS Right 3/9/2022    Procedure: ARTHROPLASTY KNEE TOTAL;  Surgeon: Chandni Tuttle DO;  Location: [facility];  Service: Orthopedics    SC ARTHRP KNE CONDYLE&PLATU MEDIAL&LAT COMPARTMENTS Left 2022    Procedure: ARTHROPLASTY KNEE TOTAL;  Surgeon: Chandni Tuttle DO;  Location[facility];  Service: Orthopedics    SC CYSTOURETHROSCOPY N/A 2016    Procedure: CYSTOSCOPY, BOTOX INJECTION;  Surgeon: Abraham Teague MD;   Location: [facility];  Service: Gynecology    RI INSJ/RPLCMT SPINAL NPG/RCVR POCKET CRTJ&CONNJ Right 2/10/2021    Procedure: REPLACEMENT IMPLANTABLE PULSE GENERATOR DORSAL SPINAL COLUMN STIMULATOR, RIGHT;  Surgeon: Carlos Alberto Jacome MD;  Location: [facility];  Service: Neurosurgery    RI PRQ IMPLTJ NSTIM ELECTRODE ARRAY EPIDURAL Right 7/28/2020    Procedure: INSERTION THORACIC DORSAL COLUMN SPINAL CORD STIMULATOR PERCUTANEOUS W IMPLANTABLE PULSE GENERATOR, RIGHT;  Surgeon: Carlos Alberto Jacome MD;  Location: [facility];  Service: Neurosurgery    TONSILLECTOMY      TUBAL LIGATION  1986    UPPER GASTROINTESTINAL ENDOSCOPY  09/2020      Family History   Problem Relation Age of Onset    Colon cancer Mother     Alzheimer's disease Father     Stroke Father     No Known Problems Sister     Colon cancer Brother     Bipolar disorder Brother     No Known Problems Brother     Depression Paternal Grandfather     Breast cancer Maternal Aunt     Colon cancer Maternal Aunt     Seizures Son     Heart disease Other     Diabetes Other     Hypertension Other     Thyroid disease Neg Hx       Social History     Tobacco Use    Smoking status: Never    Smokeless tobacco: Never   Vaping Use    Vaping status: Never Used   Substance Use Topics    Alcohol use: Not Currently     Comment: 2 x year; being a social drinker as per all scripts     Drug use: No      E-Cigarette/Vaping    E-Cigarette Use Never User       E-Cigarette/Vaping Substances    Nicotine No     THC No     CBD No     Flavoring No     Other No     Unknown No       I have reviewed and agree with the history as documented.     HPI  58-year-old female presents with generalized bodyaches, worsening anxiety.  Patient resides in a rehab facility, due to her chronic pain and is due for back surgery stemming from her chronic back pain.  Patient also has a history of seizures, spinal cord stimulator, ambulatory dysfunction, left-sided weakness from a stroke back in 2009.  Patient states that her  chronic pain was unbearable today and triggered her anxiety.  Patient states that anxiety was worse to the point where she started having suicidal ideation.  Patient denies any homicidal ideation and reports no hallucinations.  Patient took her morphine with minimal relief.  Patient called EMS because she felt like she was having a panic attack.  Denies any chest pain, palpitations.  Patient reports no other complaints.    Review of Systems   Constitutional:  Negative for chills, diaphoresis, fever and unexpected weight change.   HENT:  Negative for ear pain and sore throat.    Eyes:  Negative for visual disturbance.   Respiratory:  Negative for cough, chest tightness and shortness of breath.    Cardiovascular:  Negative for chest pain and leg swelling.   Gastrointestinal:  Negative for abdominal distention, abdominal pain, constipation, diarrhea, nausea and vomiting.   Endocrine: Negative.    Genitourinary:  Negative for difficulty urinating and dysuria.   Musculoskeletal:  Positive for myalgias.   Skin: Negative.    Allergic/Immunologic: Negative.    Neurological: Negative.    Hematological: Negative.    Psychiatric/Behavioral:  The patient is nervous/anxious.    All other systems reviewed and are negative.          Objective       ED Triage Vitals   Temperature Pulse Blood Pressure Respirations SpO2 Patient Position - Orthostatic VS   11/16/24 2143 11/16/24 2143 11/16/24 2143 11/16/24 2143 11/16/24 2143 11/16/24 2143   98.9 °F (37.2 °C) 82 163/97 18 100 % Lying      Temp Source Heart Rate Source BP Location FiO2 (%) Pain Score    11/16/24 2143 11/16/24 2143 11/16/24 2143 -- 11/16/24 2222    Tympanic Monitor Left arm  10 - Worst Possible Pain      Vitals      Date and Time Temp Pulse SpO2 Resp BP Pain Score FACES Pain Rating User   11/17/24 0812 -- 81 100 % 18 135/89 -- -- HM   11/17/24 0418 -- 67 98 % 18 101/67 -- -- MN   11/17/24 0122 -- 72 100 % 18 100/59 -- -- MN   11/16/24 2354 -- 70 98 % 18 -- -- -- MN    11/16/24 2346 -- -- -- -- 165/103 -- -- MN   11/16/24 2222 -- -- -- -- -- 10 - Worst Possible Pain -- MN   11/16/24 2143 98.9 °F (37.2 °C) 82 100 % 18 163/97 -- -- AM            Physical Exam  Vitals and nursing note reviewed.   Constitutional:       General: She is not in acute distress.     Appearance: Normal appearance. She is not ill-appearing.   HENT:      Head: Normocephalic and atraumatic.      Right Ear: External ear normal.      Left Ear: External ear normal.      Nose: Nose normal.      Mouth/Throat:      Mouth: Mucous membranes are moist.      Pharynx: Oropharynx is clear.   Eyes:      General: No scleral icterus.        Right eye: No discharge.         Left eye: No discharge.      Extraocular Movements: Extraocular movements intact.      Conjunctiva/sclera: Conjunctivae normal.      Pupils: Pupils are equal, round, and reactive to light.   Cardiovascular:      Rate and Rhythm: Normal rate and regular rhythm.      Pulses: Normal pulses.      Heart sounds: Normal heart sounds.   Pulmonary:      Effort: Pulmonary effort is normal.      Breath sounds: Normal breath sounds.   Abdominal:      General: Abdomen is flat. Bowel sounds are normal. There is no distension.      Palpations: Abdomen is soft.      Tenderness: There is no abdominal tenderness. There is no guarding or rebound.   Musculoskeletal:         General: Normal range of motion.      Cervical back: Normal range of motion and neck supple.   Skin:     General: Skin is warm and dry.      Capillary Refill: Capillary refill takes less than 2 seconds.   Neurological:      General: No focal deficit present.      Mental Status: She is alert and oriented to person, place, and time. Mental status is at baseline.   Psychiatric:         Mood and Affect: Mood normal.         Behavior: Behavior normal.         Thought Content: Thought content normal.         Judgment: Judgment normal.         Results Reviewed       Procedure Component Value Units Date/Time     TSH [[id number]]  (Normal) Collected: 11/16/24 2219    Lab Status: Final result Specimen: Blood from Hand, Right Updated: 11/16/24 2309     TSH 3RD GENERATON 1.759 uIU/mL     Comprehensive metabolic panel [[id number]]  (Abnormal) Collected: 11/16/24 2219    Lab Status: Final result Specimen: Blood from Hand, Right Updated: 11/16/24 2255     Sodium 138 mmol/L      Potassium 3.4 mmol/L      Chloride 103 mmol/L      CO2 22 mmol/L      ANION GAP 13 mmol/L      BUN 12 mg/dL      Creatinine 0.65 mg/dL      Glucose 107 mg/dL      Calcium 9.9 mg/dL      AST 22 U/L      ALT 17 U/L      Alkaline Phosphatase 101 U/L      Total Protein 7.7 g/dL      Albumin 4.6 g/dL      Total Bilirubin 1.39 mg/dL      eGFR 96 ml/min/1.73sq m     Narrative:      National Kidney Disease Foundation guidelines for Chronic Kidney Disease (CKD):     Stage 1 with normal or high GFR (GFR > 90 mL/min/1.73 square meters)    Stage 2 Mild CKD (GFR = 60-89 mL/min/1.73 square meters)    Stage 3A Moderate CKD (GFR = 45-59 mL/min/1.73 square meters)    Stage 3B Moderate CKD (GFR = 30-44 mL/min/1.73 square meters)    Stage 4 Severe CKD (GFR = 15-29 mL/min/1.73 square meters)    Stage 5 End Stage CKD (GFR <15 mL/min/1.73 square meters)  Note: GFR calculation is accurate only with a steady state creatinine    Ethanol [[id number]]  (Normal) Collected: 11/16/24 2219    Lab Status: Final result Specimen: Blood from Arm, Right Updated: 11/16/24 2252     Ethanol Lvl <10 mg/dL     Urine Microscopic [[id number]]  (Abnormal) Collected: 11/16/24 2201    Lab Status: Final result Specimen: Urine, Other Updated: 11/16/24 2250     RBC, UA 10-20 /hpf      WBC, UA 1-2 /hpf      Epithelial Cells Occasional /hpf      Bacteria, UA Occasional /hpf     Rapid drug screen, urine [[id number]]  (Abnormal) Collected: 11/16/24 2201    Lab Status: Final result Specimen: Urine, Other Updated: 11/16/24 2229     Amph/Meth UR Negative     Barbiturate Ur Negative     Benzodiazepine Urine Negative      Cocaine Urine Negative     Methadone Urine Negative     Opiate Urine Positive     PCP Ur Negative     THC Urine Negative     Oxycodone Urine Negative     Fentanyl Urine Negative     HYDROCODONE URINE Negative    Narrative:      Presumptive report. If requested, specimen will be sent to reference lab for confirmation.  FOR MEDICAL PURPOSES ONLY.   IF CONFIRMATION NEEDED PLEASE CONTACT THE LAB WITHIN 5 DAYS.    Drug Screen Cutoff Levels:  AMPHETAMINE/METHAMPHETAMINES  1000 ng/mL  BARBITURATES     200 ng/mL  BENZODIAZEPINES     200 ng/mL  COCAINE      300 ng/mL  METHADONE      300 ng/mL  OPIATES      300 ng/mL  PHENCYCLIDINE     25 ng/mL  THC       50 ng/mL  OXYCODONE      100 ng/mL  FENTANYL      5 ng/mL  HYDROCODONE     300 ng/mL    POCT alcohol breath test [[id number]]  (Normal) Resulted: 11/16/24 2225    Lab Status: Final result Updated: 11/16/24 2225     EXTBreath Alcohol 0.000    UA w Reflex to Microscopic w Reflex to Culture [[id number]]  (Abnormal) Collected: 11/16/24 2201    Lab Status: Final result Specimen: Urine, Other Updated: 11/16/24 2214     Color, UA Straw     Clarity, UA Clear     Specific Gravity, UA 1.010     pH, UA 8.0     Leukocytes, UA 25.0     Nitrite, UA Negative     Protein, UA 15 (Trace) mg/dl      Glucose, UA Negative mg/dl      Ketones, UA Negative mg/dl      Bilirubin, UA Negative     Occult Blood, UA 50.0     UROBILINOGEN UA Negative mg/dL     CBC and differential [[id number]] Collected: 11/16/24 2202    Lab Status: Final result Specimen: Blood from Arm, Left Updated: 11/16/24 2210     WBC 5.21 Thousand/uL      RBC 4.97 Million/uL      Hemoglobin 14.7 g/dL      Hematocrit 42.8 %      MCV 86 fL      MCH 29.6 pg      MCHC 34.3 g/dL      RDW 13.1 %      MPV 9.5 fL      Platelets 242 Thousands/uL      nRBC 0 /100 WBCs      Segmented % 69 %      Immature Grans % 0 %      Lymphocytes % 22 %      Monocytes % 7 %      Eosinophils Relative 1 %      Basophils Relative 1 %      Absolute Neutrophils  3.60 Thousands/µL      Absolute Immature Grans 0.02 Thousand/uL      Absolute Lymphocytes 1.16 Thousands/µL      Absolute Monocytes 0.34 Thousand/µL      Eosinophils Absolute 0.06 Thousand/µL      Basophils Absolute 0.03 Thousands/µL             No orders to display       Procedures    ED Medication and Procedure Management   Prior to Admission Medications   Prescriptions Last Dose Informant Patient Reported? Taking?   Diclofenac Sodium (SOLARAZE) 3 % GEL   No No   Sig: Apply 1 Application topically 2 (two) times a day   Diclofenac Sodium (VOLTAREN) 1 %   No No   Sig: Apply 2 g topically 4 (four) times a day as needed (pain)   Valbenazine Tosylate (Ingrezza) 60 MG CAPS   No No   Sig: Take 1 capsule by mouth in the morning   acetaminophen (TYLENOL) 500 mg tablet  Self No No   Sig: Take 1 tablet (500 mg total) by mouth every 8 (eight) hours as needed for mild pain   aluminum-magnesium hydroxide 200-200 MG/5ML suspension   No No   Sig: Take 5 mL by mouth every 6 (six) hours as needed for heartburn   amLODIPine (NORVASC) 5 mg tablet   No No   Sig: Take 1 tablet (5 mg total) by mouth daily   aspirin (Aspirin Low Dose) 81 mg EC tablet   No No   Sig: Take 1 tablet (81 mg total) by mouth daily   atorvastatin (LIPITOR) 40 mg tablet   No No   Sig: Take 1 tablet (40 mg total) by mouth daily   busPIRone (BUSPAR) 10 mg tablet   No No   Sig: Take 2 tablets (20 mg total) by mouth 3 (three) times a day   cyclobenzaprine (FLEXERIL) 10 mg tablet   Yes No   Sig: Take 15 mg by mouth 3 (three) times a day   linaCLOtide (Linzess) 290 MCG CAPS   No No   Sig: Take 1 capsule by mouth daily before breakfast   methocarbamol (ROBAXIN) 500 mg tablet   No No   Sig: Take 1 tablet (500 mg total) by mouth 4 (four) times a day for 14 days   prazosin (MINIPRESS) 2 mg capsule   Yes No   Sig: Take 2 mg by mouth daily at bedtime   pregabalin (LYRICA) 150 mg capsule   No No   Sig: Take 1 capsule (150 mg total) by mouth daily   propranolol (INDERAL) 20 mg  tablet   Yes No   Sig: Take 20 mg by mouth daily as needed (anxiety)   sertraline (ZOLOFT) 100 mg tablet   No No   Sig: Take 1.5 tablets (150 mg total) by mouth daily   traZODone (DESYREL) 50 mg tablet   No No   Sig: Take 1 tablet (50 mg total) by mouth daily at bedtime      Facility-Administered Medications: None     Patient's Medications   Discharge Prescriptions    No medications on file     No discharge procedures on file.  ED SEPSIS DOCUMENTATION   Time reflects when diagnosis was documented in both MDM as applicable and the Disposition within this note       Time User Action Codes Description Comment    11/16/2024 10:01 PM Rikki Arango [F41.9] Anxiety     11/16/2024 10:01 PM Rikki Arango [R45.851] Suicidal ideation                  Rikki Arango MD  11/17/24 8821

## 2024-11-17 NOTE — ED NOTES
Report received from previous nurse; patient is currently sleeping; respirations are even and non-labored; Q15 min checks continue     Deya Rosales RN  11/17/24 0410

## 2024-11-17 NOTE — ED NOTES
Met with patient to provide update. Patient is tearful and continues to state she is experiencing significant pain and this has trigger thoughts of wanting to die. Breathing fast and not deeply. Encouraged her to take deeper longer breaths to decreased anxiety and tension. Adjusted gown due to neck being too tight, per patient's request. Adjusted bed per her request. Will update the patient periodically

## 2024-11-17 NOTE — ED NOTES
Recipient    Name: HAMZAH CORONA   Recipient ID: [id number]   YOB: 1965   Gender: Female           Eligibility Summary  Type Name   Medicaid Category: MHX  Program Status: 00  Service Program: [facility] Funding Only - Non-Medic   [id number]   Medicaid Category: J  Program Status: 00  Service Program: HCB50-ADULT   Other or Additional Payor MEDICARE PART B   Other or Additional Payor MEDICARE PART A   Other or Additional Payor [facility] CHOICE

## 2024-11-17 NOTE — ED CARE HANDOFF
Emergency Department Sign Out Note        Sign out and transfer of care from [facility]. See Separate Emergency Department note.     The patient, Samantha Rodriguez, was evaluated by the previous provider for see prior note.      ED Course / Workup Pending (followup):                                    ED Course as of 11/17/24 0307   Sat Nov 16, 2024   8358 S/O: [place], anxiety, SI no plan, chronic pain. Crisis consult pending. Can leave.     Procedures  Medical Decision Making          Disposition  Final diagnoses:   Anxiety   Suicidal ideation     Time reflects when diagnosis was documented in both MDM as applicable and the Disposition within this note       Time User Action Codes Description Comment    11/16/2024 10:01 PM Rikki Arango Add [F41.9] Anxiety     11/16/2024 10:01 PM Rikki Arango Add [R45.851] Suicidal ideation           ED Disposition       ED Disposition   Transfer to Behavioral Health Condition   --    Date/Time   Sat Nov 16, 2024 10:01 PM    Comment   Saamntha Rodriguez should be transferred out to  and has been medically cleared.               Follow-up Information    None       Patient's Medications   Discharge Prescriptions    No medications on file     No discharge procedures on file.       ED Provider  Electronically Signed by     Heber Bustamante MD  11/17/24 0307

## 2024-11-17 NOTE — ED CARE HANDOFF
Emergency Department Sign Out Note        Sign out and transfer of care from Dr Bustamante. See Separate Emergency Department note.     The patient, Samantha Rodriguez, was evaluated by the previous provider for SI.    Workup Completed:  As completed    ED Course / Workup Pending (followup):  On 201                                     Procedures  MDM        Disposition  Final diagnoses:   Anxiety   Suicidal ideation     Time reflects when diagnosis was documented in both MDM as applicable and the Disposition within this note       Time User Action Codes Description Comment    11/16/2024 10:01 PM Rikki Arango Add [F41.9] Anxiety     11/16/2024 10:01 PM Rikki Arango Add [R45.851] Suicidal ideation           ED Disposition       ED Disposition   Transfer to Behavioral Health Condition   --    Date/Time   Sat Nov 16, 2024 10:01 PM    Comment   Samantha Rodriguez should be transferred out to  and has been medically cleared.               Follow-up Information    None       Patient's Medications   Discharge Prescriptions    No medications on file     No discharge procedures on file.       ED Provider  Electronically Signed by     Sonny Min MD  11/17/24 0707

## 2024-11-18 PROBLEM — F33.9 EPISODE OF RECURRENT MAJOR DEPRESSIVE DISORDER (HCC): Status: ACTIVE | Noted: 2024-11-18

## 2024-11-18 LAB
25(OH)D3 SERPL-MCNC: 40.4 NG/ML (ref 30–100)
EST. AVERAGE GLUCOSE BLD GHB EST-MCNC: 91 MG/DL
FOLATE SERPL-MCNC: 21.4 NG/ML
HBA1C MFR BLD: 4.8 %

## 2024-11-18 PROCEDURE — 99223 1ST HOSP IP/OBS HIGH 75: CPT | Performed by: PSYCHIATRY & NEUROLOGY

## 2024-11-18 PROCEDURE — 83036 HEMOGLOBIN GLYCOSYLATED A1C: CPT

## 2024-11-18 RX ORDER — BENZTROPINE MESYLATE 1 MG/ML
1 INJECTION, SOLUTION INTRAMUSCULAR; INTRAVENOUS
Status: DISCONTINUED | OUTPATIENT
Start: 2024-11-18 | End: 2024-12-02 | Stop reason: HOSPADM

## 2024-11-18 RX ORDER — MORPHINE SULFATE 15 MG/1
15 TABLET ORAL EVERY 4 HOURS PRN
COMMUNITY
End: 2024-12-02

## 2024-11-18 RX ORDER — CYCLOBENZAPRINE HCL 10 MG
10 TABLET ORAL 3 TIMES DAILY PRN
Status: DISCONTINUED | OUTPATIENT
Start: 2024-11-18 | End: 2024-12-02 | Stop reason: HOSPADM

## 2024-11-18 RX ORDER — HYDROXYZINE HYDROCHLORIDE 25 MG/1
25 TABLET, FILM COATED ORAL
Status: DISCONTINUED | OUTPATIENT
Start: 2024-11-18 | End: 2024-12-02 | Stop reason: HOSPADM

## 2024-11-18 RX ORDER — OLANZAPINE 2.5 MG/1
2.5 TABLET, FILM COATED ORAL
Status: DISCONTINUED | OUTPATIENT
Start: 2024-11-18 | End: 2024-12-02 | Stop reason: HOSPADM

## 2024-11-18 RX ORDER — SUMATRIPTAN 50 MG/1
50 TABLET, FILM COATED ORAL ONCE AS NEEDED
COMMUNITY
End: 2024-12-02

## 2024-11-18 RX ORDER — PROPRANOLOL HYDROCHLORIDE 10 MG/1
5 TABLET ORAL EVERY 8 HOURS PRN
Status: DISCONTINUED | OUTPATIENT
Start: 2024-11-18 | End: 2024-11-27

## 2024-11-18 RX ORDER — ACETAMINOPHEN 325 MG/1
650 TABLET ORAL EVERY 4 HOURS PRN
Status: DISCONTINUED | OUTPATIENT
Start: 2024-11-18 | End: 2024-12-02 | Stop reason: HOSPADM

## 2024-11-18 RX ORDER — POLYETHYLENE GLYCOL 3350 17 G/17G
17 POWDER, FOR SOLUTION ORAL DAILY PRN
Status: DISCONTINUED | OUTPATIENT
Start: 2024-11-18 | End: 2024-12-02 | Stop reason: HOSPADM

## 2024-11-18 RX ORDER — TRAZODONE HYDROCHLORIDE 50 MG/1
50 TABLET, FILM COATED ORAL
Status: DISCONTINUED | OUTPATIENT
Start: 2024-11-18 | End: 2024-11-21

## 2024-11-18 RX ORDER — IBUPROFEN 600 MG/1
600 TABLET, FILM COATED ORAL EVERY 8 HOURS PRN
Status: DISCONTINUED | OUTPATIENT
Start: 2024-11-18 | End: 2024-12-02 | Stop reason: HOSPADM

## 2024-11-18 RX ORDER — OLANZAPINE 10 MG/2ML
5 INJECTION, POWDER, FOR SOLUTION INTRAMUSCULAR
Status: DISCONTINUED | OUTPATIENT
Start: 2024-11-18 | End: 2024-12-02 | Stop reason: HOSPADM

## 2024-11-18 RX ORDER — METHOCARBAMOL 500 MG/1
500 TABLET, FILM COATED ORAL EVERY 6 HOURS PRN
Status: DISCONTINUED | OUTPATIENT
Start: 2024-11-18 | End: 2024-12-02 | Stop reason: HOSPADM

## 2024-11-18 RX ORDER — BENZTROPINE MESYLATE 0.5 MG/1
0.5 TABLET ORAL
Status: DISCONTINUED | OUTPATIENT
Start: 2024-11-18 | End: 2024-12-02 | Stop reason: HOSPADM

## 2024-11-18 RX ORDER — LORAZEPAM 1 MG/1
1 TABLET ORAL
Status: DISCONTINUED | OUTPATIENT
Start: 2024-11-18 | End: 2024-11-20

## 2024-11-18 RX ORDER — AMOXICILLIN 250 MG
1 CAPSULE ORAL DAILY PRN
Status: DISCONTINUED | OUTPATIENT
Start: 2024-11-18 | End: 2024-12-02 | Stop reason: HOSPADM

## 2024-11-18 RX ORDER — HYDROXYZINE HYDROCHLORIDE 50 MG/1
50 TABLET, FILM COATED ORAL
Status: DISCONTINUED | OUTPATIENT
Start: 2024-11-18 | End: 2024-12-02 | Stop reason: HOSPADM

## 2024-11-18 RX ORDER — OLANZAPINE 5 MG/1
5 TABLET ORAL
Status: DISCONTINUED | OUTPATIENT
Start: 2024-11-18 | End: 2024-12-02 | Stop reason: HOSPADM

## 2024-11-18 RX ORDER — SENNA AND DOCUSATE SODIUM 50; 8.6 MG/1; MG/1
1 TABLET, FILM COATED ORAL 2 TIMES DAILY
COMMUNITY
End: 2024-12-02

## 2024-11-18 RX ORDER — MAGNESIUM HYDROXIDE/ALUMINUM HYDROXICE/SIMETHICONE 120; 1200; 1200 MG/30ML; MG/30ML; MG/30ML
30 SUSPENSION ORAL EVERY 4 HOURS PRN
Status: DISCONTINUED | OUTPATIENT
Start: 2024-11-18 | End: 2024-12-02 | Stop reason: HOSPADM

## 2024-11-18 RX ORDER — CALCIUM CARBONATE 500 MG/1
1 TABLET, CHEWABLE ORAL AS NEEDED
COMMUNITY
End: 2024-12-02

## 2024-11-18 RX ORDER — LORAZEPAM 2 MG/ML
1 INJECTION INTRAMUSCULAR
Status: DISCONTINUED | OUTPATIENT
Start: 2024-11-18 | End: 2024-12-02 | Stop reason: HOSPADM

## 2024-11-18 RX ORDER — BISACODYL 10 MG
10 SUPPOSITORY, RECTAL RECTAL DAILY PRN
Status: DISCONTINUED | OUTPATIENT
Start: 2024-11-18 | End: 2024-12-02 | Stop reason: HOSPADM

## 2024-11-18 RX ADMIN — LORAZEPAM 1 MG: 1 TABLET ORAL at 21:22

## 2024-11-18 RX ADMIN — PREGABALIN 150 MG: 25 CAPSULE ORAL at 09:30

## 2024-11-18 RX ADMIN — TRAZODONE HYDROCHLORIDE 50 MG: 50 TABLET ORAL at 21:22

## 2024-11-18 RX ADMIN — SERTRALINE HYDROCHLORIDE 150 MG: 50 TABLET ORAL at 09:24

## 2024-11-18 RX ADMIN — METHOCARBAMOL TABLETS 500 MG: 500 TABLET, COATED ORAL at 13:11

## 2024-11-18 RX ADMIN — ASPIRIN 81 MG: 81 TABLET, COATED ORAL at 09:24

## 2024-11-18 RX ADMIN — BUSPIRONE HYDROCHLORIDE 20 MG: 10 TABLET ORAL at 09:24

## 2024-11-18 RX ADMIN — MORPHINE SULFATE 15 MG: 15 TABLET ORAL at 09:24

## 2024-11-18 RX ADMIN — AMLODIPINE BESYLATE 5 MG: 5 TABLET ORAL at 09:24

## 2024-11-18 RX ADMIN — CYCLOBENZAPRINE HYDROCHLORIDE 10 MG: 10 TABLET, FILM COATED ORAL at 09:24

## 2024-11-18 RX ADMIN — SENNOSIDES AND DOCUSATE SODIUM 1 TABLET: 50; 8.6 TABLET ORAL at 17:05

## 2024-11-18 RX ADMIN — MORPHINE SULFATE 15 MG: 15 TABLET ORAL at 20:22

## 2024-11-18 RX ADMIN — METHOCARBAMOL TABLETS 500 MG: 500 TABLET, COATED ORAL at 21:22

## 2024-11-18 RX ADMIN — METHOCARBAMOL TABLETS 500 MG: 500 TABLET, COATED ORAL at 09:24

## 2024-11-18 NOTE — ASSESSMENT & PLAN NOTE
Samantha has a long history of major depressive disorder with episodes occurring throughout adult life  She describes 6 total inpatient hospitalizations due to depressive episodes  Expressing suicidal ideation on ED admission as well as at present  signed a 201 is now awaiting placement  Patient is treated on Zoloft 150 mg BuSpar 20 mg 3 times daily at present    Plan:  Continue Zoloft 150 mg for depression  Continue BuSpar 20 mg 3 times daily for depression

## 2024-11-18 NOTE — ED NOTES
Patient is accepted at  6T.  Patient is accepted by     Patient may go to the floor at 1300          Nurse report is to be called to [phone] prior to patient transfer.
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
## 2024-11-18 NOTE — ED NOTES
Pt currently sleeping, respirations are even and non labored.      Zofia Guido RN  11/18/24 0037

## 2024-11-18 NOTE — ED NOTES
Discussed placement with Dad.  He is ok with some other facilities if [facility] or [facility] do not open beds, but not [facility] or [facility]

## 2024-11-18 NOTE — ED CARE HANDOFF
Emergency Department Sign Out Note        Sign out and transfer of care from Dr. Bustamante. See Separate Emergency Department note.     The patient, Samantha Rodriguez, was evaluated by the previous provider for psychiatric evaluation, SI, 201 signed.    Workup Completed:  Medical clearance    ED Course / Workup Pending (followup):  Pending placement                                  ED Course as of 11/18/24 0653   Mon Nov 18, 2024   0652 SO: 201 signed pending placement     Procedures  MDM        Disposition  Final diagnoses:   Anxiety   Suicidal ideation     Time reflects when diagnosis was documented in both MDM as applicable and the Disposition within this note       Time User Action Codes Description Comment    11/16/2024 10:01 PM Rikki Arango Add [F41.9] Anxiety     11/16/2024 10:01 PM Rikki Arango Add [R45.851] Suicidal ideation           ED Disposition       ED Disposition   Transfer to Behavioral Health    [facility]   --    Date/Time   Sat Nov 16, 2024 10:01 PM    Comment   Samantha Rodriguez should be transferred out to  and has been medically cleared.               MD Documentation      Flowsheet Row Most Recent Value   Sending MD Dr. Bustamante          Follow-up Information       Follow up With Specialties Details Why Contact Info    Glo Valente DO Gastroenterology, Internal Medicine Follow up  [address] [place] [address] [id number] [phone]      Latesha Miller MD Family Medicine Follow up  [address]  [address] [place] [id number] [phone]            Patient's Medications   Discharge Prescriptions    No medications on file     No discharge procedures on file.       ED Provider  Electronically Signed by     Vladimir Reid MD  11/18/24 0653

## 2024-11-18 NOTE — ASSESSMENT & PLAN NOTE
"Patient has a history of spinal stenosis with severe pain  Patient has been in rehab, with plan to have surgical procedure in mid December  Requires significant pain management for \"10 out of 10 pain\"    Plan:  Pain management per primary team  "

## 2024-11-18 NOTE — NURSING NOTE
"Patient is a 201 admit from [facility] for increased anxiety/depression and passive SI. Patient reports having worsening anxiety/depression and panic attacks because her pain is not managed well at her rehab facility. Patient in rehab facility for about a month for chronic back pain and is supposed to have back surgery on 12/19. Patient has a hx of hypertension, chronic back pain, CVA, bipolar and PTSD. Patient calm and cooperative with admission process. Endorses anxiety/depression, and passive \"death wish\", reports feeling \"safe\" on the unit. Denies AVH. Skin intact, ambulates with a walker. Patient was oriented to the unit. Fall precautions and Q 15 minutes safety checks initiated.  "
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
## 2024-11-18 NOTE — NURSING NOTE
"Patient visible in milieu. Withdrawn to self. Pleasant and cooperative on approach. Endorsing depression, anxiety, and passive death wish. States, \"I wish I didn't exist.\" Denies HI and /. Able to contract for safety while on unit. Administered PRN senna per patient request.  "

## 2024-11-18 NOTE — ED NOTES
Insurance Authorization for admission:   Phone call placed to [facility] ([facility])  Phone number:[phone]  Spoke to Marly.     ** days approved.  Level of care: **.to be determined  Clinical faxed to [phone] for authorization    Review on **.   Authorization # **.   To be  determined

## 2024-11-18 NOTE — TREATMENT TEAM
11/18/24 1529   Provider Notification   Reason for Communication Admission   Provider Name Kimberly Jones   Provider Role Attending physician   Method of Communication   (secure chat)   Response No new orders   Notification Time 1529     Notified Dr. Jones and Kimberly MADRID of PTA med list reviewed using outside facility report.

## 2024-11-18 NOTE — ASSESSMENT & PLAN NOTE
Longstanding history of generalized anxiety disorder with panic attacks  Panic attacks described as episodes of shortness of breath, rapid heart rate, rapid thoughts, dissociations, crying, and incontinence  Patient frequently requesting Ativan or other benzos    Plan:  Treatment as above

## 2024-11-19 PROBLEM — K59.03 OPIOID-INDUCED CONSTIPATION: Status: ACTIVE | Noted: 2024-02-19

## 2024-11-19 PROBLEM — F33.2 SEVERE EPISODE OF RECURRENT MAJOR DEPRESSIVE DISORDER, WITHOUT PSYCHOTIC FEATURES (HCC): Status: ACTIVE | Noted: 2024-11-18

## 2024-11-19 PROBLEM — T40.2X5A OPIOID-INDUCED CONSTIPATION: Status: ACTIVE | Noted: 2024-02-19

## 2024-11-19 LAB
ALBUMIN SERPL BCG-MCNC: 3.8 G/DL (ref 3.5–5)
ALP SERPL-CCNC: 79 U/L (ref 34–104)
ALT SERPL W P-5'-P-CCNC: 17 U/L (ref 7–52)
ANION GAP SERPL CALCULATED.3IONS-SCNC: 12 MMOL/L (ref 4–13)
AST SERPL W P-5'-P-CCNC: 17 U/L (ref 13–39)
ATRIAL RATE: 64 BPM
BASOPHILS # BLD AUTO: 0.03 THOUSANDS/ÂΜL (ref 0–0.1)
BASOPHILS NFR BLD AUTO: 1 % (ref 0–1)
BILIRUB SERPL-MCNC: 0.73 MG/DL (ref 0.2–1)
BUN SERPL-MCNC: 15 MG/DL (ref 5–25)
CALCIUM SERPL-MCNC: 8.5 MG/DL (ref 8.4–10.2)
CHLORIDE SERPL-SCNC: 103 MMOL/L (ref 96–108)
CHOLEST SERPL-MCNC: 99 MG/DL (ref ?–200)
CO2 SERPL-SCNC: 26 MMOL/L (ref 21–32)
CREAT SERPL-MCNC: 0.73 MG/DL (ref 0.6–1.3)
EOSINOPHIL # BLD AUTO: 0.17 THOUSAND/ÂΜL (ref 0–0.61)
EOSINOPHIL NFR BLD AUTO: 4 % (ref 0–6)
ERYTHROCYTE [DISTWIDTH] IN BLOOD BY AUTOMATED COUNT: 13.2 % (ref 11.6–15.1)
GFR SERPL CREATININE-BSD FRML MDRD: 89 ML/MIN/1.73SQ M
GLUCOSE P FAST SERPL-MCNC: 97 MG/DL (ref 65–99)
GLUCOSE SERPL-MCNC: 97 MG/DL (ref 65–140)
HCT VFR BLD AUTO: 39.7 % (ref 34.8–46.1)
HDLC SERPL-MCNC: 49 MG/DL
HGB BLD-MCNC: 12.9 G/DL (ref 11.5–15.4)
IMM GRANULOCYTES # BLD AUTO: 0.01 THOUSAND/UL (ref 0–0.2)
IMM GRANULOCYTES NFR BLD AUTO: 0 % (ref 0–2)
LDLC SERPL CALC-MCNC: 42 MG/DL (ref 0–100)
LYMPHOCYTES # BLD AUTO: 1.64 THOUSANDS/ÂΜL (ref 0.6–4.47)
LYMPHOCYTES NFR BLD AUTO: 35 % (ref 14–44)
MAGNESIUM SERPL-MCNC: 1.8 MG/DL (ref 1.9–2.7)
MCH RBC QN AUTO: 29 PG (ref 26.8–34.3)
MCHC RBC AUTO-ENTMCNC: 32.5 G/DL (ref 31.4–37.4)
MCV RBC AUTO: 89 FL (ref 82–98)
MONOCYTES # BLD AUTO: 0.44 THOUSAND/ÂΜL (ref 0.17–1.22)
MONOCYTES NFR BLD AUTO: 10 % (ref 4–12)
NEUTROPHILS # BLD AUTO: 2.34 THOUSANDS/ÂΜL (ref 1.85–7.62)
NEUTS SEG NFR BLD AUTO: 50 % (ref 43–75)
NONHDLC SERPL-MCNC: 50 MG/DL
NRBC BLD AUTO-RTO: 0 /100 WBCS
P AXIS: 64 DEGREES
PHOSPHATE SERPL-MCNC: 5.1 MG/DL (ref 2.3–4.1)
PLATELET # BLD AUTO: 226 THOUSANDS/UL (ref 149–390)
PMV BLD AUTO: 9.5 FL (ref 8.9–12.7)
POTASSIUM SERPL-SCNC: 3.2 MMOL/L (ref 3.5–5.3)
PR INTERVAL: 142 MS
PROT SERPL-MCNC: 6.2 G/DL (ref 6.4–8.4)
QRS AXIS: 68 DEGREES
QRSD INTERVAL: 96 MS
QT INTERVAL: 444 MS
QTC INTERVAL: 459 MS
RBC # BLD AUTO: 4.45 MILLION/UL (ref 3.81–5.12)
SODIUM SERPL-SCNC: 141 MMOL/L (ref 135–147)
T WAVE AXIS: 43 DEGREES
TRIGL SERPL-MCNC: 40 MG/DL (ref ?–150)
VENTRICULAR RATE: 64 BPM
VIT B12 SERPL-MCNC: 636 PG/ML (ref 180–914)
WBC # BLD AUTO: 4.63 THOUSAND/UL (ref 4.31–10.16)

## 2024-11-19 PROCEDURE — GZHZZZZ GROUP PSYCHOTHERAPY: ICD-10-PCS | Performed by: PSYCHIATRY & NEUROLOGY

## 2024-11-19 PROCEDURE — 80061 LIPID PANEL: CPT

## 2024-11-19 PROCEDURE — 84100 ASSAY OF PHOSPHORUS: CPT

## 2024-11-19 PROCEDURE — 99223 1ST HOSP IP/OBS HIGH 75: CPT | Performed by: PSYCHIATRY & NEUROLOGY

## 2024-11-19 PROCEDURE — 93010 ELECTROCARDIOGRAM REPORT: CPT | Performed by: STUDENT IN AN ORGANIZED HEALTH CARE EDUCATION/TRAINING PROGRAM

## 2024-11-19 PROCEDURE — 85025 COMPLETE CBC W/AUTO DIFF WBC: CPT

## 2024-11-19 PROCEDURE — 80053 COMPREHEN METABOLIC PANEL: CPT

## 2024-11-19 PROCEDURE — 93005 ELECTROCARDIOGRAM TRACING: CPT

## 2024-11-19 PROCEDURE — 99221 1ST HOSP IP/OBS SF/LOW 40: CPT | Performed by: NURSE PRACTITIONER

## 2024-11-19 PROCEDURE — 82607 VITAMIN B-12: CPT

## 2024-11-19 PROCEDURE — 83735 ASSAY OF MAGNESIUM: CPT

## 2024-11-19 RX ORDER — POLYETHYLENE GLYCOL 3350 17 G/17G
17 POWDER, FOR SOLUTION ORAL DAILY
Status: DISCONTINUED | OUTPATIENT
Start: 2024-11-19 | End: 2024-12-02 | Stop reason: HOSPADM

## 2024-11-19 RX ORDER — ARIPIPRAZOLE 5 MG/1
5 TABLET ORAL DAILY
Status: DISCONTINUED | OUTPATIENT
Start: 2024-11-19 | End: 2024-11-23

## 2024-11-19 RX ORDER — POTASSIUM CHLORIDE 1500 MG/1
40 TABLET, EXTENDED RELEASE ORAL EVERY 4 HOURS
Status: COMPLETED | OUTPATIENT
Start: 2024-11-19 | End: 2024-11-19

## 2024-11-19 RX ORDER — LANOLIN ALCOHOL/MO/W.PET/CERES
400 CREAM (GRAM) TOPICAL DAILY
Status: DISCONTINUED | OUTPATIENT
Start: 2024-11-19 | End: 2024-12-02 | Stop reason: HOSPADM

## 2024-11-19 RX ORDER — SERTRALINE HYDROCHLORIDE 100 MG/1
200 TABLET, FILM COATED ORAL
Status: DISCONTINUED | OUTPATIENT
Start: 2024-11-19 | End: 2024-12-02 | Stop reason: HOSPADM

## 2024-11-19 RX ORDER — ATORVASTATIN CALCIUM 40 MG/1
40 TABLET, FILM COATED ORAL
Status: DISCONTINUED | OUTPATIENT
Start: 2024-11-19 | End: 2024-12-02 | Stop reason: HOSPADM

## 2024-11-19 RX ORDER — LORATADINE 10 MG/1
10 TABLET ORAL DAILY
Status: DISCONTINUED | OUTPATIENT
Start: 2024-11-19 | End: 2024-12-02 | Stop reason: HOSPADM

## 2024-11-19 RX ORDER — LUBIPROSTONE 8 UG/1
8 CAPSULE ORAL 2 TIMES DAILY
Status: DISCONTINUED | OUTPATIENT
Start: 2024-11-19 | End: 2024-12-02 | Stop reason: HOSPADM

## 2024-11-19 RX ORDER — ASPIRIN 81 MG/1
81 TABLET ORAL DAILY
Status: DISCONTINUED | OUTPATIENT
Start: 2024-11-19 | End: 2024-12-02 | Stop reason: HOSPADM

## 2024-11-19 RX ORDER — PANTOPRAZOLE SODIUM 40 MG/1
40 TABLET, DELAYED RELEASE ORAL
Status: DISCONTINUED | OUTPATIENT
Start: 2024-11-20 | End: 2024-12-02 | Stop reason: HOSPADM

## 2024-11-19 RX ORDER — AMLODIPINE BESYLATE 5 MG/1
5 TABLET ORAL DAILY
Status: DISCONTINUED | OUTPATIENT
Start: 2024-11-19 | End: 2024-12-02 | Stop reason: HOSPADM

## 2024-11-19 RX ORDER — PRAZOSIN HYDROCHLORIDE 1 MG/1
2 CAPSULE ORAL
Status: DISCONTINUED | OUTPATIENT
Start: 2024-11-19 | End: 2024-11-29

## 2024-11-19 RX ORDER — BUSPIRONE HYDROCHLORIDE 10 MG/1
20 TABLET ORAL 3 TIMES DAILY
Status: DISCONTINUED | OUTPATIENT
Start: 2024-11-19 | End: 2024-11-25

## 2024-11-19 RX ADMIN — POLYETHYLENE GLYCOL 3350 17 G: 17 POWDER, FOR SOLUTION ORAL at 14:26

## 2024-11-19 RX ADMIN — PREGABALIN 150 MG: 100 CAPSULE ORAL at 17:15

## 2024-11-19 RX ADMIN — BUSPIRONE HYDROCHLORIDE 20 MG: 10 TABLET ORAL at 17:15

## 2024-11-19 RX ADMIN — PREGABALIN 150 MG: 100 CAPSULE ORAL at 12:54

## 2024-11-19 RX ADMIN — ARIPIPRAZOLE 5 MG: 5 TABLET ORAL at 14:26

## 2024-11-19 RX ADMIN — Medication 400 MG: at 12:55

## 2024-11-19 RX ADMIN — ALUMINUM HYDROXIDE, MAGNESIUM HYDROXIDE, AND DIMETHICONE 30 ML: 200; 20; 200 SUSPENSION ORAL at 21:36

## 2024-11-19 RX ADMIN — LUBIPROSTONE 8 MCG: 8 CAPSULE, GELATIN COATED ORAL at 21:19

## 2024-11-19 RX ADMIN — MORPHINE SULFATE 15 MG: 15 TABLET ORAL at 03:55

## 2024-11-19 RX ADMIN — PRAZOSIN HYDROCHLORIDE 2 MG: 1 CAPSULE ORAL at 21:19

## 2024-11-19 RX ADMIN — Medication 1000 UNITS: at 12:54

## 2024-11-19 RX ADMIN — POTASSIUM CHLORIDE 40 MEQ: 1500 TABLET, EXTENDED RELEASE ORAL at 17:16

## 2024-11-19 RX ADMIN — POTASSIUM CHLORIDE 40 MEQ: 1500 TABLET, EXTENDED RELEASE ORAL at 12:54

## 2024-11-19 RX ADMIN — SERTRALINE HYDROCHLORIDE 200 MG: 100 TABLET ORAL at 21:19

## 2024-11-19 RX ADMIN — LUBIPROSTONE 8 MCG: 8 CAPSULE, GELATIN COATED ORAL at 14:26

## 2024-11-19 RX ADMIN — BUSPIRONE HYDROCHLORIDE 20 MG: 10 TABLET ORAL at 21:19

## 2024-11-19 RX ADMIN — IBUPROFEN 600 MG: 600 TABLET, FILM COATED ORAL at 18:04

## 2024-11-19 RX ADMIN — AMLODIPINE BESYLATE 5 MG: 5 TABLET ORAL at 12:54

## 2024-11-19 RX ADMIN — LORATADINE 10 MG: 10 TABLET ORAL at 12:55

## 2024-11-19 RX ADMIN — MORPHINE SULFATE 15 MG: 15 TABLET ORAL at 14:42

## 2024-11-19 RX ADMIN — TRAZODONE HYDROCHLORIDE 50 MG: 50 TABLET ORAL at 21:27

## 2024-11-19 RX ADMIN — ASPIRIN 81 MG: 81 TABLET, COATED ORAL at 12:54

## 2024-11-19 RX ADMIN — ATORVASTATIN CALCIUM 40 MG: 40 TABLET, FILM COATED ORAL at 17:16

## 2024-11-19 RX ADMIN — LORAZEPAM 1 MG: 1 TABLET ORAL at 18:04

## 2024-11-19 NOTE — TREATMENT TEAM
11/19/24 1802   Pain Assessment   Pain Assessment Tool 0-10   Pain Score 10 - Worst Possible Pain   Pain Location/Orientation Location: Back   Pain Onset/Description Onset: Ongoing   Patient's Stated Pain Goal No pain   Hospital Pain Intervention(s) Medication (See MAR)     Administered 600 mg ibuprofen for breakthrough pain rated 10 on a 0-10 pain scale.

## 2024-11-19 NOTE — ASSESSMENT & PLAN NOTE
Samantha endorses history of symptomatology suggestive of PTSD (post traumatic stress disorder). Samantha reports recurrent, involuntary, and intrusive distressing memories of trauma that truly impair functionality. Samantha endorses flashbacks, memory-flooding, and avoidance behaviors. Samantha reports nightmares, fragmented sleep, and exagerated startle response secondary to trauma.     Plan:  Continue prazosin 2 mg at night for nightmares  Continue ongoing medication management

## 2024-11-19 NOTE — NURSING NOTE
Patient visible in milieu. Social with peers. Pleasant and cooperative. Endorses depression and anxiety. Preoccupied with family and new grandchild. Denies SI/HI. Medication compliant.

## 2024-11-19 NOTE — TREATMENT TEAM
11/19/24 1804   Allen Anxiety Scale   Anxious Mood 4   Tension 4   Fears 4   Insomnia 4   Intellectual 3   Depressed Mood 4   Somatic Complaints: Muscular 4   Somatic Complaints: Sensory 3   Cardiovascular Symptoms 0   Respiratory Symptoms 0   Gastrointestinal Symptoms 0   Genitourinary Symptoms 0   Autonomic Symptoms 0   Behavior at Interview 0   Allen Anxiety Score 30     Administered ativan 1 mg for severe anxiety.

## 2024-11-19 NOTE — ASSESSMENT & PLAN NOTE
"Patient has a history of spinal stenosis with severe pain  Patient has been in rehab, with plan to have surgical procedure in mid December  Requires significant pain management for \"10 out of 10 pain\"    Plan:  Pain management per medical team  "

## 2024-11-19 NOTE — TREATMENT TEAM
11/19/24 1442   Pain Assessment   Pain Assessment Tool 0-10   Pain Score 10 - Worst Possible Pain   Pain Location/Orientation Location: Back   Patient's Stated Pain Goal No pain   Hospital Pain Intervention(s) Medication (See MAR)

## 2024-11-19 NOTE — NURSING NOTE
Patient is calm, visible intermittently, endorses anxiety and depression. Patient denies SI, AH, VH at the moment. No behavioral issues noted, safety checks maintained.

## 2024-11-19 NOTE — ASSESSMENT & PLAN NOTE
[facility]  Management per primary service

## 2024-11-19 NOTE — TREATMENT TEAM
11/18/24 2022   Pain Assessment   Pain Score 10 - Worst Possible Pain   Pain Location/Orientation Location: Back   Pain Onset/Description Onset: Ongoing   Effect of Pain on Daily Activities limits rest   Patient's Stated Pain Goal No pain   Hospital Pain Intervention(s) Medication (See MAR)     Administered 15 mg morphine for patient report of back pain rated 10 on 0-10 pain scale.

## 2024-11-19 NOTE — ASSESSMENT & PLAN NOTE
Longstanding history of generalized anxiety disorder with panic attacks  Panic attacks described as episodes of shortness of breath, rapid heart rate, rapid thoughts, dissociations, crying, and incontinence  Patient frequently requesting Ativan or other benzos    Plan:  Increased Zoloft to 200 mg at bedtime for ongoing symptoms of depression and anxiety  Increased Lyrica to 150 mg twice daily for anxiety and chronic pain  Continue Buspar 20 mg three times daily for generalized anxiety  At this time will avoid benzodiazepine use in the setting of current morphine use

## 2024-11-19 NOTE — H&P
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 6.2 (L) 11/19/2024    ALB 3.8 11/19/2024    TBILI 0.73 11/19/2024    CHOLESTEROL 99 11/19/2024    HDL 49 (L) 11/19/2024    TRIG 40 11/19/2024    LDLCALC 42 11/19/2024    NONHDLC 50 11/19/2024    LITHIUM <0.1 (L) 03/23/2023    AMMONIA <10 (L) 06/27/2021    HFL6CXFEQOZE 1.759 11/16/2024    FREET4 0.80 02/13/2020    PREGSERUM Negative 02/22/2014    RPR Non-Reactive 06/29/2021    HGBA1C 4.8 11/18/2024    EAG 91 11/18/2024        Administrative Statements   I have spent a total time of 60 minutes in caring for this patient on the day of the visit/encounter including Counseling / Coordination of care, Documenting in the medical record, Reviewing / ordering tests, medicine, procedures  , Obtaining or reviewing history  , and Communicating with other healthcare professionals . Topics discussed with the patient / family include symptom assessment and management, medication review, and medication adjustment.    Oleksandr Massey MD

## 2024-11-19 NOTE — ASSESSMENT & PLAN NOTE
Admission labs: CBC, CMP, lipid panel, TSH acceptable  Folate, B12, Vitamin D stable   Vitals stable   UA blood, leuks, protein, RBCs   UDS positive for opiate (prescribed Morphine)  EKG reveals NSR, bpm QTC   Patient is medically cleared for admission to U and treatment of underlying psychiatric illness based on available results  Please contact [facility] with any questions or concerns
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
## 2024-11-19 NOTE — PROGRESS NOTES
11/19/24 0800 11/19/24 1000 11/19/24 1100   Activity/Group Checklist   Group Exercise Community meeting Other (Comment)  ( Recovery: Letting go, change and purpose)   Attendance Did not attend Did not attend Did not attend   Attendance Duration (min)  --   --   --    Interactions  --   --   --    Affect/Mood  --   --   --    Goals Achieved  --   --   --       11/19/24 1300 11/19/24 1430   Activity/Group Checklist   Group Pet therapy Admission/Discharge  (self assessment interview completed at bedside.)   Attendance Attended Attended   Attendance Duration (min) 0-15 16-30   Interactions Interacted appropriately Interacted appropriately  (Pt. desires to improve calmness in order to get ready for tentative back surgery in Dec. Pt. self esteem has decreased since  having panic attacks and tardive dyskinesia and had to stop her  job.)   Affect/Mood Appropriate;Bright Appropriate   Goals Achieved Able to engage in interactions Able to self-disclose

## 2024-11-19 NOTE — CONSULTS
"Consultation - Hospitalist   Name: Samantha Rodriguez 58 y.o. female I MRN: [id number]  Unit/Bed#: [facility] 606-01 I Date of Admission: 11/16/2024   Date of Service: 11/19/2024 I Hospital Day: 1   Inpatient consult for Medical Clearance for  patient  Consult performed by: MERCY Kim  Consult ordered by: MERCY Sanchez        Physician Requesting Evaluation: Sagar Jones DO   Reason for Evaluation / Principal Problem[facility]      Assessment & Plan  Medical clearance for psychiatric admission  Admission labs: CBC, CMP, lipid panel, TSH acceptable  Folate, B12, Vitamin D stable   Vitals stable   UA blood, leuks, protein, RBCs   UDS positive for opiate (prescribed Morphine)  EKG reveals NSR, bpm QTC   Patient is medically cleared for admission to U and treatment of underlying psychiatric illness based on available results  Please contact [facility] with any questions or concerns  Lumbar spondylosis  Scheduled for spinal surgery on Dec 19th for \"FUSION LUMBAR/THORACIC POSTERIOR  Removal of right T7 pedicle screw and resection of the right proximal rudy; Removal hardware L3-S1, posterior osteotomy L5-S1, posterior fusion L5-S1, revision instrumentation L2 to pelvis; application of bilateral S2 alar screws; possible application of iliac bolts; possible bone grafting of the disc at L5-S1; iliac crest bone graft (Spine Lumbar) .\"  Has been staying in SNF prior to surgery 2/2 pain   Continue pain regimen, Morphine IR 15 mg q6h PRN  Robaxin and Flexeril  Tylenol and Motrin PRN   Supportive care   Obesity, morbid (HCC)  Would benefit from weight loss and therapeutic lifestyle changes  Education and counseling as tolerated   Consider nutrition evaluation     Opioid-induced constipation  On Morphine for back pain  Continue home Linzess (auto substitute with Amitiza)  Tardive dyskinesia  Continue home Ingrezza   History of CVA (cerebrovascular accident)  Continue home ASA and statin   Mixed " "hyperlipidemia  Continue home statin   Episode of recurrent major depressive disorder (HCC)  Admitted to [facility]  Management per primary service   Generalized anxiety disorder with panic attacks  Admitted to [facility]  Management per primary service   PTSD (post-traumatic stress disorder)  Admitted to [facility]  Management per primary service     Please contact the SecureChat role,\" \", with any questions/concerns.   psychiatry medical provider     Collaboration of Care: Were Recommendations Directly Discussed with Primary Treatment Team? - Yes     History of Present Illness   Samantha Rodriguez is a 58 y.o. female with a PMH including scoliosis, lumbar spine pain, ambulatory dysfunction, anxiety who is originally admitted to the psychiatry service due to anxiety and panic attack. We are consulted for medical clearance for admission to Behavioral Health Unit and treatment of underlying psychiatric illness.  Patient has been in a SNF due to back pain and ambulatory dysfunction. She is scheduled for spinal surgery on 12/19/24. She reports increased anxiety and SI due to uncontrolled pain for which she was admitted to [facility]. Currently, she is resting in bed.     Review of Systems   Constitutional:  Negative for appetite change and chills.   HENT:  Negative for congestion and sore throat.    Eyes:  Negative for visual disturbance.   Respiratory:  Negative for cough and shortness of breath.    Cardiovascular:  Negative for chest pain.   Gastrointestinal:  Negative for abdominal pain, constipation, diarrhea, nausea and vomiting.   Genitourinary:  Negative for difficulty urinating.   Musculoskeletal:  Negative for gait problem.   Skin:  Negative for wound.   Neurological:  Negative for dizziness, light-headedness and headaches.     Historical Information   Past Medical History:   Diagnosis Date    Anxiety     Arthritis     left knee    Arthritis of right knee 10/6/2020    At risk for falls     Bipolar 2 disorder (HCC)     " FOLLOWS WITH PSYCHIATRIST. CONTINUE LAMOTRIGINE; RESOLVED: 2016    Depression     Familial tremor     both hands    Fibromyalgia     LAST ASSESSED: [id number]    GERD (gastroesophageal reflux disease)     Hearing aid worn     left ear    [place] (hard of hearing)     left ear    Hyperlipidemia     Hypertension     Left-sided weakness     Lumbar disc disease with radiculopathy 2018    Memory loss of unknown cause     long and short term    Migraine     Multiple closed fractures of metatarsal bone of right foot 2021    Obesity     Obesity, Class II, BMI 35-39.9     Osteoarthritis of both hips 10/31/2016    Osteoarthritis of knee 2013    Description: Continue Tylenol and Naproxen. Encourage exercise and weight loss. Patient refused physical therapy. I will refer the patient back to Orthopedics.    Overactive bladder     Panic attack     Patellofemoral disorder of both knees 2020    Post traumatic stress disorder     Primary localized osteoarthritis of both knees 2017    Primary osteoarthritis of both knees 2020    S/P insertion of spinal cord stimulator     no remote    S/P total knee arthroplasty, right 3/10/2022    Sacroiliitis (HCC) 2017    Seasonal allergies     Seizure-like activity (HCC) 6/3/2022    Seizures (HCC)     possible seizure like activity    Small bowel obstruction (HCC) 3/24/2023    Status post total knee replacement, left 2022    Stroke ([facility])     questionable stroke 2009    Tardive dyskinesia     PATIENT STATES    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  CONT ASA 81 MG DAILY; RESOLVED: [id number]    Urinary incontinence     Uses walker     Wears dentures     partial lower / full upper- doesnt wear    Wears glasses      Past Surgical History:   Procedure Laterality Date    BACK SURGERY       SECTION      COLONOSCOPY      RESOLVED: 2016    EAR SURGERY      EGD      HYSTERECTOMY  2004    MYRINGOTOMY W/ TUBES Left     NECK SURGERY   04/2019    MI ARTHRP KNE CONDYLE&PLATU MEDIAL&LAT COMPARTMENTS Right 3/9/2022    Procedure: ARTHROPLASTY KNEE TOTAL;  Surgeon: Chandni Tuttle DO;  [facility];  Service: Orthopedics    MI ARTHRP KNE CONDYLE&PLATU MEDIAL&LAT COMPARTMENTS Left 7/5/2022    Procedure: ARTHROPLASTY KNEE TOTAL;  Surgeon: Chandni Tuttle DO;  Location[facility];  Service: Orthopedics    MI CYSTOURETHROSCOPY N/A 2/18/2016    Procedure: CYSTOSCOPY, BOTOX INJECTION;  Surgeon: Abraham Teague MD;  Location: [facility];  Service: Gynecology    MI INSJ/RPLCMT SPINAL NPG/RCVR POCKET CRTJ&CONNJ Right 2/10/2021    Procedure: REPLACEMENT IMPLANTABLE PULSE GENERATOR DORSAL SPINAL COLUMN STIMULATOR, RIGHT;  Surgeon: Carlos Alberto Jacome MD;  Location: [facility];  Service: Neurosurgery    MI PRQ IMPLTJ NSTIM ELECTRODE ARRAY EPIDURAL Right 7/28/2020    Procedure: INSERTION THORACIC DORSAL COLUMN SPINAL CORD STIMULATOR PERCUTANEOUS W IMPLANTABLE PULSE GENERATOR, RIGHT;  Surgeon: Carlos Alberto Jacome MD;  [facility];  Service: Neurosurgery    TONSILLECTOMY      TUBAL LIGATION  1986    UPPER GASTROINTESTINAL ENDOSCOPY  09/2020     Social History     Tobacco Use    Smoking status: Never    Smokeless tobacco: Never   Vaping Use    Vaping status: Never Used   Substance and Sexual Activity    Alcohol use: Not Currently     Comment: 2 x year; being a social drinker as per all scripts     Drug use: No    Sexual activity: Not Currently     E-Cigarette/Vaping    E-Cigarette Use Never User      E-Cigarette/Vaping Substances    Nicotine No     THC No     CBD No     Flavoring No     Other No     Unknown No      Family history non-contributory  Marital Status:     Meds/Allergies   I have reviewed home medications using recent Epic encounter.  Prior to Admission medications    Medication Sig Start Date End Date Taking? Authorizing Provider   amLODIPine (NORVASC) 5 mg tablet Take 1 tablet (5 mg total) by mouth daily 4/26/24 11/18/24 Yes  Glo Valente DO   aspirin (Aspirin Low Dose) 81 mg EC tablet Take 1 tablet (81 mg total) by mouth daily 6/28/24  Yes MERCY Gerber   atorvastatin (LIPITOR) 40 mg tablet Take 1 tablet (40 mg total) by mouth daily 9/17/24  Yes Caroline Kumar DO   busPIRone (BUSPAR) 10 mg tablet Take 2 tablets (20 mg total) by mouth 3 (three) times a day 11/12/24 2/10/25 Yes Arron Canseco MD   calcium carbonate (TUMS) 500 mg chewable tablet Chew 1 tablet if needed for indigestion or heartburn   Yes Historical Provider, MD   Cholecalciferol (VITAMIN D3) 1,000 units tablet Take 1,000 Units by mouth daily   Yes Historical Provider, MD   Diclofenac Sodium (VOLTAREN) 1 % Apply 2 g topically 4 (four) times a day as needed (pain) 7/23/24  Yes Yfn Ruelas DO   linaCLOtide (Linzess) 290 MCG CAPS Take 1 capsule by mouth daily before breakfast 4/24/24  Yes Lourdes Yañez PA-C   methocarbamol (ROBAXIN) 500 mg tablet Take 1 tablet (500 mg total) by mouth 4 (four) times a day for 14 days 4/26/24 11/18/24 Yes Glo Valente DO   morphine (MSIR) 15 mg tablet Take 15 mg by mouth every 4 (four) hours as needed for severe pain   Yes Historical Provider, MD   prazosin (MINIPRESS) 2 mg capsule Take 2 mg by mouth daily at bedtime 10/21/24  Yes Historical Provider, MD   pregabalin (LYRICA) 150 mg capsule Take 1 capsule (150 mg total) by mouth daily 11/12/24 12/12/24 Yes Arron Canseco MD   propranolol (INDERAL) 20 mg tablet Take 20 mg by mouth daily as needed (anxiety) 11/3/24  Yes Historical Provider, MD   senna-docusate sodium (SENOKOT-S) 8.6-50 mg per tablet Take 1 tablet by mouth 2 (two) times a day   Yes Historical Provider, MD   sertraline (ZOLOFT) 100 mg tablet Take 1.5 tablets (150 mg total) by mouth daily 11/12/24 2/10/25 Yes Arron Canseco MD   traZODone (DESYREL) 50 mg tablet Take 1 tablet (50 mg total) by mouth daily at bedtime 11/12/24 2/10/25 Yes Arron Canseco MD   Valbenazine Tosylate (Ingrezza) 60 MG CAPS Take 1 capsule by mouth in the  "morning 6/28/24  Yes MERCY Gerber   acetaminophen (TYLENOL) 500 mg tablet Take 1 tablet (500 mg total) by mouth every 8 (eight) hours as needed for mild pain  Patient not taking: Reported on 11/18/2024 10/1/23   Glo Valente DO   aluminum-magnesium hydroxide 200-200 MG/5ML suspension Take 5 mL by mouth every 6 (six) hours as needed for heartburn  Patient not taking: Reported on 11/18/2024 4/24/24   Glo Valente DO   cyclobenzaprine (FLEXERIL) 10 mg tablet Take 15 mg by mouth 3 (three) times a day 10/30/24   Historical Provider, MD   Diclofenac Sodium (SOLARAZE) 3 % GEL Apply 1 Application topically 2 (two) times a day 8/15/24   Fiordaliza Parker DO   SUMAtriptan (IMITREX) 50 mg tablet Take 50 mg by mouth once as needed for migraine    Historical Provider, MD     No Known Allergies    Objective :  Temp:  [97 °F (36.1 °C)-97.5 °F (36.4 °C)] 97 °F (36.1 °C)  HR:  [64-91] 71  BP: (114-147)/(64-84) 120/65  Resp:  [17-18] 18  SpO2:  [95 %-97 %] 97 %  O2 Device: None (Room air)    Height: 5' 1\" (154.9 cm) (11/18/24 1330)  Weight - Scale: 91.3 kg (201 lb 3.2 oz) (11/18/24 1330)  Physical Exam  Constitutional:       General: She is not in acute distress.     Appearance: She is obese. She is not toxic-appearing or diaphoretic.   HENT:      Head: Normocephalic.      Mouth/Throat:      Mouth: Mucous membranes are moist.   Cardiovascular:      Rate and Rhythm: Normal rate.   Pulmonary:      Effort: Pulmonary effort is normal. No respiratory distress.      Breath sounds: Normal breath sounds.   Abdominal:      General: Abdomen is flat. Bowel sounds are normal. There is no distension.      Palpations: Abdomen is soft.      Tenderness: There is no abdominal tenderness. There is no guarding or rebound.   Musculoskeletal:      Cervical back: Normal range of motion.      Right lower leg: No edema.      Left lower leg: No edema.   Skin:     General: Skin is warm and dry.      Capillary Refill: Capillary refill takes less than 2 " seconds.   Neurological:      Mental Status: She is alert and oriented to person, place, and time.   Psychiatric:         Behavior: Behavior normal.           Lab Results: I have reviewed the following results:  Results from last 7 days   Lab Units 11/19/24  0622   WBC Thousand/uL 4.63   HEMOGLOBIN g/dL 12.9   HEMATOCRIT % 39.7   PLATELETS Thousands/uL 226   SEGS PCT % 50   LYMPHO PCT % 35   MONO PCT % 10   EOS PCT % 4     Results from last 7 days   Lab Units 11/19/24  0622   SODIUM mmol/L 141   POTASSIUM mmol/L 3.2*   CHLORIDE mmol/L 103   CO2 mmol/L 26   BUN mg/dL 15   CREATININE mg/dL 0.73   ANION GAP mmol/L 12   CALCIUM mg/dL 8.5   ALBUMIN g/dL 3.8   TOTAL BILIRUBIN mg/dL 0.73   ALK PHOS U/L 79   ALT U/L 17   AST U/L 17   GLUCOSE RANDOM mg/dL 97             Lab Results   Component Value Date/Time    HGBA1C 4.8 11/18/2024 04:34 PM    HGBA1C 5.2 02/20/2024 04:29 PM    HGBA1C 4.9 11/30/2023 12:00 AM    HGBA1C 4.6 03/01/2022 09:51 AM    HGBA1C 4.8 02/08/2021 05:56 AM           Imaging Results Review: No pertinent imaging studies reviewed.  Other Study Results Review: EKG was reviewed.     Administrative Statements   I have spent a total time of   minutes in caring for this patient on the day of the visit/encounter including Diagnostic results, Risk factor reductions, Counseling / Coordination of care, Documenting in the medical record, Reviewing / ordering tests, medicine, procedures  , Obtaining or reviewing history  , and Communicating with other healthcare professionals .  ** Please Note: This note has been constructed using a voice recognition system. **

## 2024-11-19 NOTE — TREATMENT TEAM
11/18/24 2122   Pain Assessment Post Intervention   Pain Assessment Tool Used: 0-10   Post Intervention Pain Score 5   Post Intervention Pain Location/Orientation Location: Back   Response to Interventions partially effective     Patient reported morphine 15 mg was partially effective for back pain. Rated back pain 5 on 0-10 pain scale.

## 2024-11-19 NOTE — TREATMENT TEAM
11/19/24 0354   Pain Assessment   Pain Assessment Tool 0-10   Pain Score 10 - Worst Possible Pain   Pain Location/Orientation Orientation: Upper;Location: Back;Location: Shoulder   Pain Onset/Description Onset: Ongoing   Effect of Pain on Daily Activities patient unable to sleep   Patient's Stated Pain Goal No pain   Hospital Pain Intervention(s) Medication (See MAR)   Multiple Pain Sites No     Medicated with Morphine IR 15 mg po

## 2024-11-19 NOTE — PLAN OF CARE
Problem: Ineffective Coping  Goal: Cooperates with admission process  Description: Interventions:   - Complete admission process  Outcome: Progressing  Goal: Identifies ineffective coping skills  Outcome: Progressing  Goal: Identifies healthy coping skills  Outcome: Progressing  Goal: Demonstrates healthy coping skills  Outcome: Progressing  Goal: Participates in unit activities  Description: Interventions:  - Provide therapeutic environment   - Provide required programming   - Redirect inappropriate behaviors   Outcome: Progressing  Goal: Patient/Family participate in treatment and DC plans  Description: Interventions:  - Provide therapeutic environment  Outcome: Progressing  Goal: Patient/Family verbalizes awareness of resources  Outcome: Progressing  Goal: Understands least restrictive measures  Description: Interventions:  - Utilize least restrictive behavior  Outcome: Progressing  Goal: Free from restraint events  Description: - Utilize least restrictive measures   - Provide behavioral interventions   - Redirect inappropriate behaviors   Outcome: Progressing     Problem: Depression  Goal: Treatment Goal: Demonstrate behavioral control of depressive symptoms, verbalize feelings of improved mood/affect, and adopt new coping skills prior to discharge  Outcome: Progressing  Goal: Verbalize thoughts and feelings  Description: Interventions:  - Assess and re-assess patient's level of risk   - Engage patient in 1:1 interactions, daily, for a minimum of 15 minutes   - Encourage patient to express feelings, fears, frustrations, hopes   Outcome: Progressing  Goal: Refrain from harming self  Description: Interventions:  - Monitor patient closely, per order   - Supervise medication ingestion, monitor effects and side effects   Outcome: Progressing  Goal: Refrain from isolation  Description: Interventions:  - Develop a trusting relationship   - Encourage socialization   Outcome: Progressing  Goal: Refrain from  self-neglect  Outcome: Progressing  Goal: Attend and participate in unit activities, including therapeutic, recreational, and educational groups  Description: Interventions:  - Provide therapeutic and educational activities daily, encourage attendance and participation, and document same in the medical record   Outcome: Progressing  Goal: Complete daily ADLs, including personal hygiene independently, as able  Description: Interventions:  - Observe, teach, and assist patient with ADLS  -  Monitor and promote a balance of rest/activity, with adequate nutrition and elimination   Outcome: Progressing     Problem: Anxiety  Goal: Anxiety is at manageable level  Description: Interventions:  - Assess and monitor patient's anxiety level.   - Monitor for signs and symptoms (heart palpitations, chest pain, shortness of breath, headaches, nausea, feeling jumpy, restlessness, irritable, apprehensive).   - Collaborate with interdisciplinary team and initiate plan and interventions as ordered.  - [place] patient to unit/surroundings  - Explain treatment plan  - Encourage participation in care  - Encourage verbalization of concerns/fears  - Identify coping mechanisms  - Assist in developing anxiety-reducing skills  - Administer/offer alternative therapies  - Limit or eliminate stimulants  Outcome: Progressing

## 2024-11-19 NOTE — TREATMENT TEAM
11/19/24 1558   Pain Assessment Post Intervention   Pain Assessment Tool Used: 0-10   Post Intervention Pain Score 6   Post Intervention Pain Location/Orientation Location: Back   Response to Interventions partially effective     Patient reports morphine 15 mg was partially effective. Rated back pain 6 on a 0-10 pain scale.

## 2024-11-19 NOTE — TREATMENT TEAM
11/18/24 2120   Allen Anxiety Scale   Anxious Mood 4   Tension 4   Fears 4   Insomnia 4   Intellectual 3   Depressed Mood 4   Somatic Complaints: Muscular 4   Somatic Complaints: Sensory 3   Cardiovascular Symptoms 0   Respiratory Symptoms 0   Gastrointestinal Symptoms 0   Genitourinary Symptoms 0   Autonomic Symptoms 0   Behavior at Interview 0   Allen Anxiety Score 30     Administered 1 mg ativan for patient report of severe anxiety.

## 2024-11-19 NOTE — ASSESSMENT & PLAN NOTE
Samantha has a long history of major depressive disorder with episodes occurring throughout adult life  She describes 6 total inpatient hospitalizations due to depressive episodes  At the present moment, Samantha reports that recently she has been experiencing severe symptoms of depression and anxiety progressively worsening in the past month in the setting of chronic pain, physical limitations in the setting of chronic pain, and recent stay at rehab facility  Expressing suicidal ideation on ED admission as well as at present    Plan  Increased Zoloft to 200 mg at bedtime for ongoing symptoms of depression and anxiety  Started Abilify 5 mg daily for antidepressant augmentation

## 2024-11-19 NOTE — TREATMENT TEAM
11/19/24 0915   Team Meeting   Meeting Type Daily Rounds   Initial Conference Date 11/19/24   Team Members Present   Team Members Present Physician;Nurse;   Physician Team Member Dr Jones, Dr Palafox, Dr Massey, Renae MADRID   Nursing Team Member Martha   Care Management Team Member Lory MERCADO Team Member Tyler   Patient/Family Present   Patient Present No   Patient's Family Present No      201 admission from  ED due to increased anxiety, depression and passive SI. Reports increase in symptoms due [facility] Reports shoulder and back pain; scheduled for surgery on 12/19 with [facility]. PRN Ativan and Robaxin at 2122. PRN Trazodone with good effect.

## 2024-11-19 NOTE — ASSESSMENT & PLAN NOTE
"Scheduled for spinal surgery on Dec 19th for \"FUSION LUMBAR/THORACIC POSTERIOR  Removal of right T7 pedicle screw and resection of the right proximal rudy; Removal hardware L3-S1, posterior osteotomy L5-S1, posterior fusion L5-S1, revision instrumentation L2 to pelvis; application of bilateral S2 alar screws; possible application of iliac bolts; possible bone grafting of the disc at L5-S1; iliac crest bone graft (Spine Lumbar) .\"  Has been staying in SNF prior to surgery 2/2 pain   Continue pain regimen, Morphine IR 15 mg q6h PRN  Robaxin and Flexeril  Tylenol and Motrin PRN   Supportive care   "

## 2024-11-20 PROCEDURE — 97163 PT EVAL HIGH COMPLEX 45 MIN: CPT

## 2024-11-20 PROCEDURE — 99232 SBSQ HOSP IP/OBS MODERATE 35: CPT | Performed by: PSYCHIATRY & NEUROLOGY

## 2024-11-20 RX ORDER — HYDROXYZINE HYDROCHLORIDE 50 MG/1
100 TABLET, FILM COATED ORAL EVERY 6 HOURS PRN
Status: DISCONTINUED | OUTPATIENT
Start: 2024-11-20 | End: 2024-12-02 | Stop reason: HOSPADM

## 2024-11-20 RX ADMIN — ARIPIPRAZOLE 5 MG: 5 TABLET ORAL at 08:49

## 2024-11-20 RX ADMIN — BUSPIRONE HYDROCHLORIDE 20 MG: 10 TABLET ORAL at 17:17

## 2024-11-20 RX ADMIN — TRAZODONE HYDROCHLORIDE 50 MG: 50 TABLET ORAL at 21:23

## 2024-11-20 RX ADMIN — PREGABALIN 150 MG: 100 CAPSULE ORAL at 17:17

## 2024-11-20 RX ADMIN — LUBIPROSTONE 8 MCG: 8 CAPSULE, GELATIN COATED ORAL at 08:50

## 2024-11-20 RX ADMIN — ASPIRIN 81 MG: 81 TABLET, COATED ORAL at 08:49

## 2024-11-20 RX ADMIN — ATORVASTATIN CALCIUM 40 MG: 40 TABLET, FILM COATED ORAL at 17:17

## 2024-11-20 RX ADMIN — PANTOPRAZOLE SODIUM 40 MG: 40 TABLET, DELAYED RELEASE ORAL at 06:13

## 2024-11-20 RX ADMIN — POLYETHYLENE GLYCOL 3350 17 G: 17 POWDER, FOR SOLUTION ORAL at 08:48

## 2024-11-20 RX ADMIN — HYDROXYZINE HYDROCHLORIDE 25 MG: 25 TABLET ORAL at 12:33

## 2024-11-20 RX ADMIN — BUSPIRONE HYDROCHLORIDE 20 MG: 10 TABLET ORAL at 21:18

## 2024-11-20 RX ADMIN — VALBENAZINE 60 MG: 60 CAPSULE ORAL at 08:50

## 2024-11-20 RX ADMIN — SERTRALINE HYDROCHLORIDE 200 MG: 100 TABLET ORAL at 21:18

## 2024-11-20 RX ADMIN — MORPHINE SULFATE 15 MG: 15 TABLET ORAL at 10:23

## 2024-11-20 RX ADMIN — LORATADINE 10 MG: 10 TABLET ORAL at 08:49

## 2024-11-20 RX ADMIN — AMLODIPINE BESYLATE 5 MG: 5 TABLET ORAL at 08:49

## 2024-11-20 RX ADMIN — Medication 400 MG: at 08:49

## 2024-11-20 RX ADMIN — PREGABALIN 150 MG: 100 CAPSULE ORAL at 08:49

## 2024-11-20 RX ADMIN — LUBIPROSTONE 8 MCG: 8 CAPSULE, GELATIN COATED ORAL at 21:18

## 2024-11-20 RX ADMIN — BUSPIRONE HYDROCHLORIDE 20 MG: 10 TABLET ORAL at 08:49

## 2024-11-20 RX ADMIN — MORPHINE SULFATE 15 MG: 15 TABLET ORAL at 04:08

## 2024-11-20 RX ADMIN — METHOCARBAMOL TABLETS 500 MG: 500 TABLET, COATED ORAL at 14:22

## 2024-11-20 RX ADMIN — MORPHINE SULFATE 15 MG: 15 TABLET ORAL at 20:19

## 2024-11-20 RX ADMIN — PRAZOSIN HYDROCHLORIDE 2 MG: 1 CAPSULE ORAL at 21:18

## 2024-11-20 RX ADMIN — Medication 1000 UNITS: at 08:49

## 2024-11-20 NOTE — PHYSICAL THERAPY NOTE
Physical Therapy Evaluation    Patient's Name: Samantha Rodriguez    Admitting Diagnosis  Suicidal ideation [R45.851]  Lumbar radiculopathy [M54.16]  Anxiety [F41.9]  Lumbar spondylosis [M47.816]  Depression [F32.A]  Essential hypertension [I10]  Abdominal pain [R10.9]  Post laminectomy syndrome [M96.1]  Chronic pain disorder [G89.4]  MIMI (obstructive sleep apnea) [G47.33]  History of CVA (cerebrovascular accident) [Z86.73]  Spinal stenosis of lumbar region with neurogenic claudication [M48.062]  Medical clearance for psychiatric admission [Z00.8]  Dysphagia, unspecified type [R13.10]    Problem List  Patient Active Problem List   Diagnosis    Chronic pain disorder    Lumbar radiculopathy    Lumbar spondylosis    Fibromyalgia    Spinal stenosis of lumbar region with neurogenic claudication    Bipolar disorder (HCC)    Cognitive disorder    GERD without esophagitis    Generalized anxiety disorder with panic attacks    Essential hypertension    Mood insomnia (HCC)    Migraine    Overactive bladder    PTSD (post-traumatic stress disorder)    Tremor    Urinary incontinence    Vitamin D deficiency    Post laminectomy syndrome    Obesity, morbid (HCC)    MIMI (obstructive sleep apnea)    Tardive dyskinesia    Status post insertion of spinal cord stimulator    Ambulatory dysfunction    Dysphagia    History of CVA (cerebrovascular accident)    Medical clearance for psychiatric admission    Mixed hyperlipidemia    CVA (cerebral vascular accident) (HCC)    Hemiplegia, post-stroke (HCC)    Memory loss    Numbness    Bipolar I disorder, most recent episode depressed (HCC)    Panic disorder without agoraphobia    Chronic low back pain, unspecified back pain laterality, unspecified whether sciatica present    Opioid-induced constipation    Weight gain    Severe episode of recurrent major depressive disorder, without psychotic features (HCC)       Past Medical History  Past Medical History:   Diagnosis Date    Anxiety     Arthritis      left knee    Arthritis of right knee 10/6/2020    At risk for falls     Bipolar 2 disorder (HCC)     FOLLOWS WITH PSYCHIATRIST. CONTINUE LAMOTRIGINE; RESOLVED: 2016    Depression     Familial tremor     both hands    Fibromyalgia     LAST ASSESSED: 2017    GERD (gastroesophageal reflux disease)     Hearing aid worn     left ear    [place] (hard of hearing)     left ear    Hyperlipidemia     Hypertension     Left-sided weakness     Lumbar disc disease with radiculopathy 2018    Memory loss of unknown cause     long and short term    Migraine     Multiple closed fractures of metatarsal bone of right foot 2021    Obesity     Obesity, Class II, BMI 35-39.9     Osteoarthritis of both hips 10/31/2016    Osteoarthritis of knee 2013    Description: Continue Tylenol and Naproxen. Encourage exercise and weight loss. Patient refused physical therapy. I will refer the patient back to Orthopedics.    Overactive bladder     Panic attack     Patellofemoral disorder of both knees 2020    Post traumatic stress disorder     Primary localized osteoarthritis of both knees 2017    Primary osteoarthritis of both knees 2020    S/P insertion of spinal cord stimulator     no remote    S/P total knee arthroplasty, right 3/10/2022    Sacroiliitis (HCC) 2017    Seasonal allergies     Seizure-like activity (HCC) 6/3/2022    Seizures (HCC)     possible seizure like activity    Small bowel obstruction (HCC) 3/24/2023    Status post total knee replacement, left 2022    Stroke (HCC)     questionable stroke 2009    Tardive dyskinesia     PATIENT STATES    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  CONT ASA 81 MG DAILY; RESOLVED: [id number]    Urinary incontinence     Uses walker     Wears dentures     partial lower / full upper- doesnt wear    Wears glasses        Past Surgical History  Past Surgical History:   Procedure Laterality Date    BACK SURGERY       SECTION       COLONOSCOPY      RESOLVED: [id number]    EAR SURGERY      EGD      HYSTERECTOMY  2004    MYRINGOTOMY W/ TUBES Left     NECK SURGERY  04/2019    AR ARTHRP KNE CONDYLE&PLATU MEDIAL&LAT COMPARTMENTS Right 3/9/2022    Procedure: ARTHROPLASTY KNEE TOTAL;  Surgeon: Chandni Tuttle DO;  Location: [facility];  Service: Orthopedics    AR ARTHRP KNE CONDYLE&PLATU MEDIAL&LAT COMPARTMENTS Left 7/5/2022    Procedure: ARTHROPLASTY KNEE TOTAL;  Surgeon: Chandni Tuttle DO;  [facility];  Service: Orthopedics    AR CYSTOURETHROSCOPY N/A 2/18/2016    Procedure: CYSTOSCOPY, BOTOX INJECTION;  Surgeon: Abraham Teague MD;  Location: [facility];  Service: Gynecology    AR INSJ/RPLCMT SPINAL NPG/RCVR POCKET CRTJ&CONNJ Right 2/10/2021    Procedure: REPLACEMENT IMPLANTABLE PULSE GENERATOR DORSAL SPINAL COLUMN STIMULATOR, RIGHT;  Surgeon: Carlos Alberto Jacome MD;  Location[facility];  Service: Neurosurgery    AR PRQ IMPLTJ NSTIM ELECTRODE ARRAY EPIDURAL Right 7/28/2020    Procedure: INSERTION THORACIC DORSAL COLUMN SPINAL CORD STIMULATOR PERCUTANEOUS W IMPLANTABLE PULSE GENERATOR, RIGHT;  Surgeon: Carlos Alberto Jacome MD;  Location: [facility];  Service: Neurosurgery    TONSILLECTOMY      TUBAL LIGATION  1986    UPPER GASTROINTESTINAL ENDOSCOPY  09/2020       Recent Imaging  No orders to display       Recent Vital Signs  Vitals:    11/19/24 1610 11/19/24 2050 11/19/24 2126 11/20/24 0805   BP: 137/75 99/54 113/63 113/74   BP Location: Right arm Left arm Left arm Left arm   Pulse: 61 65  63   Resp: 16 16  16   Temp: 97.5 °F (36.4 °C) (!) 96.7 °F (35.9 °C)  (!) 96.5 °F (35.8 °C)   TempSrc: Temporal Tympanic  Temporal   SpO2: 95% 95%  96%   Weight:       Height:            11/20/24 1215   PT Last Visit   PT Visit Date 11/20/24   Note Type   Note type Evaluation   Pain Assessment   Pain Assessment Tool 0-10   Pain Score 9   Pain Location/Orientation Location: Back   Restrictions/Precautions   Weight Bearing Precautions Per Order No    Other Precautions Pain;Fall Risk   Home Living   Type of Home SNF   Prior Function   Level of [place] Needs assistance with IADLS;Needs assistance with functional mobility;Needs assistance with ADLs   General   Family/Caregiver Present No   Cognition   Arousal/Participation Alert   Orientation Level Oriented X4   Memory Within functional limits   Following Commands Follows all commands and directions without difficulty   RLE Assessment   RLE Assessment   (3+/5)   LLE Assessment   LLE Assessment   (3+/5)   Coordination   Movements are Fluid and Coordinated 0   Coordination and Movement Description anatalic and decreased LE coordiation   Sensation X   Light Touch   RLE Light Touch Impaired   LLE Light Touch Impaired   Bed Mobility   Supine to Sit 5  Supervision   Additional items Increased time required   Sit to Supine 5  Supervision   Additional items Increased time required   Transfers   Sit to Stand 5  Supervision   Additional items Increased time required   Stand to Sit 5  Supervision   Additional items Increased time required   Ambulation/Elevation   Gait pattern Step through pattern;Decreased toe off;Decreased heel strike;Excessively slow;Short stride;Decreased foot clearance;Improper Weight shift   Gait Assistance 5  Supervision   Additional items Verbal cues   Assistive Device Rolling walker   Distance 75ft   Balance   Static Sitting Fair +   Dynamic Sitting Fair   Static Standing Fair -   Dynamic Standing Fair -   Ambulatory Fair -   Endurance Deficit   Endurance Deficit Yes   Endurance Deficit Description pain and fatigue   Activity Tolerance   Activity Tolerance Patient limited by fatigue;Patient limited by pain   Medical Staff Made Aware spoke to Cm   Nurse Made Aware spoke to RN   Assessment   Prognosis Good   Problem List Decreased strength;Decreased range of motion;Decreased endurance;Impaired balance;Decreased mobility;Decreased coordination;Pain;Impaired sensation;Obesity   Barriers to  Discharge Inaccessible home environment;Decreased caregiver support   Goals   Patient Goals to get back fixed and go home   STG Expiration Date 11/30/24   Short Term Goal #1 see eval note   PT Treatment Day 0   Plan   Treatment/Interventions ADL retraining;Functional transfer training;LE strengthening/ROM;Elevations;Therapeutic exercise;Endurance training;Equipment eval/education;Patient/family training;Bed mobility;Gait training;Spoke to nursing;Spoke to case management;OT   PT Frequency 2-3x/wk   Discharge Recommendation   Rehab Resource Intensity Level, PT II (Moderate Resource Intensity)   Equipment Recommended Walker   Walker Package Recommended Wheeled walker   AM-PAC Basic Mobility Inpatient   Turning in Flat Bed Without Bedrails 3   Lying on Back to Sitting on Edge of Flat Bed Without Bedrails 3   Moving Bed to Chair 3   Standing Up From Chair Using Arms 3   Walk in Room 3   Climb 3-5 Stairs With Railing 2   Basic Mobility Inpatient Raw Score 17   Basic Mobility Standardized Score 39.67   [facility] Highest Level Of Mobility   -HL Goal 5: Stand one or more mins   -HLM Achieved 7: Walk 25 feet or more   End of Consult   Patient Position at End of Consult Bedside chair;All needs within reach         ASSESSMENT                                                                                                                     Samantha Rodriguez is a 58 y.o. female admitted to [facility] on 11/16/2024 for Severe episode of recurrent major depressive disorder, without psychotic features ([facility]). Pt  has a past medical history of Anxiety, Arthritis, Arthritis of right knee (10/6/2020), At risk for falls, Bipolar 2 disorder ([facility]), Depression, Familial tremor, Fibromyalgia, GERD (gastroesophageal reflux disease), Hearing aid worn, [place] (hard of hearing), Hyperlipidemia, Hypertension, Left-sided weakness, Lumbar disc disease with radiculopathy (2/2/2018), Memory loss of unknown cause, Migraine, Multiple closed fractures  of metatarsal bone of right foot (5/2/2021), Obesity, Obesity, Class II, BMI 35-39.9, Osteoarthritis of both hips (10/31/2016), Osteoarthritis of knee (2/20/2013), Overactive bladder, Panic attack, Patellofemoral disorder of both knees (5/1/2020), Post traumatic stress disorder, Primary localized osteoarthritis of both knees (6/16/2017), Primary osteoarthritis of both knees (5/1/2020), S/P insertion of spinal cord stimulator, S/P total knee arthroplasty, right (3/10/2022), Sacroiliitis (HCC) (2/28/2017), Seasonal allergies, Seizure-like activity (HCC) (6/3/2022), Seizures (HCC), Small bowel obstruction (HCC) (3/24/2023), Status post total knee replacement, left (7/5/2022), Stroke ([facility]), Tardive dyskinesia, Thrombosis of cerebral arteries, Urinary incontinence, Uses walker, Wears dentures, and Wears glasses.. PT was consulted and pt was seen on 11/20/2024 for mobility assessment and d/c planning.  Impairments limiting pt at this time include decreased ROM, impaired balance, decreased endurance, decreased coordination, increased fall risk, new onset of impairment of functional mobility, decreased sensation, and decreased strength. Pt is currently functioning at a supervision assistance x1 level for bed mobility, supervision assistance x1 level for transfers, supervision assistance x1 level for ambulation with Rolling Walker. The patient's -[facility] Basic Mobility Inpatient Short Form Raw Score is 17. A Raw score of greater than 16 suggests the patient may benefit from discharge to home. Please also refer to the recommendation of the Physical Therapist for safe discharge planning.    Goals                                                                                                                                    1) Bed mobility skills with modified independent assistance to facilitate safe return to previous living environment 2) Functional transfers with modified independent assistance to facilitate safe return to  previous living environment  3) Ambulation with least restrictive AD modified independent assistance without LOB and stable vitals for safe ambulation home/ community distances. 4) Stair training up/down flight 12 step/s with appropriate rail/s  and modified independent assistance for safe access to previous living environment. 5) Improve balance grades to fair + to reduce risk of falls. 6)Improve LE strength grades by 1 to increase independence w/ transfers and gait.  7) PT for ongoing pt and family education; DME needs and D/C planning to promote highest level of function in least restrictive environment.     Recommendations                                                                                                              Pt will benefit from continued skilled IP PT to address the above mentioned impairments in order to maximize recovery and increase functional independence when completing mobility and ADLs. See flow sheet for goals and POC.     DME: Rolling Walker    Discharge Disposition:  Post Acute Rehab Services      Sanket Edwards PT, DPT

## 2024-11-20 NOTE — TREATMENT PLAN
TREATMENT PLAN REVIEW - Behavioral Health Samantha Rodriguez 58 y.o. 1965 female MRN: [id number]    [facility] Encounter: [id number]        Admit Date/Time:  11/16/2024  9:37 PM    Treatment Team:   DO Katherine Tovar, RN  Ruby Harris, NICK Corona    Diagnosis: Principal Problem:    Severe episode of recurrent major depressive disorder, without psychotic features (HCC)  Active Problems:    Lumbar spondylosis    Generalized anxiety disorder with panic attacks    PTSD (post-traumatic stress disorder)    Obesity, morbid (HCC)    Tardive dyskinesia    History of CVA (cerebrovascular accident)    Medical clearance for psychiatric admission    Mixed hyperlipidemia    Opioid-induced constipation      Patient Strengths/Assets: ability for insight, average or above intelligence, compliant with medication, good past treatment response, motivation for treatment/growth, patient is on a voluntary commitment, patient is willing to work on problems, supportive family, supportive friends      Patient Barriers/Limitations: financial instability, lack of financial means, lack of stable employment, low self esteem, poor physical health    Short Term Goals: decrease in depressive symptoms, decrease in anxiety symptoms, decrease in suicidal thoughts, ability to stay safe on the unit, improvement in insight, sleep improvement, tolerate medications, increase in group attendance, increase in group participation, increase in socialization with peers on the unit, acceptance of need for psychiatric treatment, acceptance of psychiatric medications    Long Term Goals: improvement in depression, improvement in anxiety, stabilization of mood, free of suicidal thoughts    Progress Towards Goals: starting psychiatric medications as prescribed    Recommended Treatment: medication  management, patient medication education, group therapy, milieu therapy, continued Behavioral Health psychiatric evaluation/assessment process     Treatment Frequency: daily medication monitoring, group and milieu therapy daily, monitoring through interdisciplinary rounds, monitoring through weekly patient care conferences    Expected Discharge Date: 9 days - 11/28/2024    Discharge Plan: referral for outpatient psychotherapy, return to previous living arrangement, continue to follow with Dr. Canseco for psychiatric medication management.    Treatment Plan Created/Updated By: Oleksandr Massey MD

## 2024-11-20 NOTE — TREATMENT TEAM
11/20/24 0408   Pain Assessment   Pain Assessment Tool 0-10   Pain Score 9   Pain Location/Orientation Location: Back   Pain Onset/Description Onset: Ongoing   Patient's Stated Pain Goal No pain   Hospital Pain Intervention(s) Medication (See MAR)   POSS Assessment   Pasero Opioid-Induced Sedation Scale (POSS) 1

## 2024-11-20 NOTE — TREATMENT TEAM
11/20/24 1023   Pain Assessment   Pain Assessment Tool 0-10   Pain Score 9   Pain Location/Orientation Orientation: Bilateral;Location: Leg   Patient's Stated Pain Goal No pain   Hospital Pain Intervention(s) Medication (See MAR)

## 2024-11-20 NOTE — PROGRESS NOTES
11/20/24 1100   Activity/Group Checklist   Group Other (Comment)  (Community support and resources: Crisis, 988 and supports)   Attendance Attended   Attendance Duration (min) 16-30   Interactions Other (Comment)  (talkative with peer in beginning of group and needed encouragement for focus)   Affect/Mood Other (Comment)  (needed encouragement to leave group room for safety)   Goals Achieved Identified feelings;Identified triggers;Discussed coping strategies;Able to listen to others;Able to engage in interactions

## 2024-11-20 NOTE — ASSESSMENT & PLAN NOTE
Continue Zoloft 200 mg at bedtime for ongoing symptoms of depression and anxiety  Continue Abilify 5 mg daily for antidepressant augmentation

## 2024-11-20 NOTE — TREATMENT TEAM
11/20/24 0855   Team Meeting   Meeting Type Daily Rounds   Initial Conference Date 11/20/24   Team Members Present   Team Members Present Physician;Nurse;;Occupational Therapist   Physician Team Member Dr Jones, Dr Palafox, Dr Massey, Renae MADRID   Nursing Team Member Martha   Care Management Team Member Lory   OT Team Member Tyler   Patient/Family Present   Patient Present No   Patient's Family Present No     Ingrezza was picked up by XAVIER haynes from [facility]; given dose this AM. Attended 2 groups, eating well, PRN Trazodone 50 mg at 2130 for insomnia, PRN morphine for pain at 0400 with good effect. Calm, pleasant, cooperative. PRN Ativan at 1804.

## 2024-11-20 NOTE — PROGRESS NOTES
11/20/24 0800 11/20/24 1000   Activity/Group Checklist   Group Exercise Community meeting   Attendance Attended Attended   Attendance Duration (min) 31-45 31-45   Interactions Interacted appropriately  (engaged in seated stretching within comfort limits.) Other (Comment)  (Pt. 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 is relating to another peer with same past interests from her former home town in [place].)

## 2024-11-20 NOTE — CMS CERTIFICATION NOTE
Certification: Based upon physical, mental and social evaluations, I certify that inpatient psychiatric services are medically necessary for this patient for a duration of 30 midnights for the treatment of Severe episode of recurrent major depressive disorder, without psychotic features (HCC)  Available alternative community resources do not meet the patient's mental health care needs.  I further attest that an established written individualized plan of care has been implemented and is outlined in the patient's medical records.

## 2024-11-20 NOTE — PLAN OF CARE
Problem: PHYSICAL THERAPY ADULT  Goal: Performs mobility at highest level of function for planned discharge setting.  See evaluation for individualized goals.  Description: Treatment/Interventions: ADL retraining, Functional transfer training, LE strengthening/ROM, Elevations, Therapeutic exercise, Endurance training, Equipment eval/education, Patient/family training, Bed mobility, Gait training, Spoke to nursing, Spoke to case management, OT  Equipment Recommended: Walker       See flowsheet documentation for full assessment, interventions and recommendations.  Outcome: Progressing  Note: Prognosis: Good  Problem List: Decreased strength, Decreased range of motion, Decreased endurance, Impaired balance, Decreased mobility, Decreased coordination, Pain, Impaired sensation, Obesity     Barriers to Discharge: Inaccessible home environment, Decreased caregiver support     Rehab Resource Intensity Level, PT: II (Moderate Resource Intensity)    See flowsheet documentation for full assessment.

## 2024-11-20 NOTE — TREATMENT TEAM
11/20/24 0508   Pain Assessment   Pain Rating: FLACC (Rest) - Face 0   Pain Rating: FLACC (Rest) - Legs 0   Pain Rating: FLACC (Rest) - Activity 0   Pain Rating: FLACC (Rest) - Cry 0   Pain Rating: FLACC (Rest) - Consolability 0   Score: FLACC (Rest) 0   POSS Assessment   [place]ero Opioid-Induced Sedation Scale (POSS) S   Pain Assessment Post Intervention   Pain Assessment Tool Used: FLACC   Response to Interventions effective

## 2024-11-20 NOTE — NURSING NOTE
"Patient reported that 1 mg ativan was effective for anxiety. Patient stated, \"I feel a lot better.\"   "

## 2024-11-20 NOTE — PLAN OF CARE
Problem: Depression  Goal: Treatment Goal: Demonstrate behavioral control of depressive symptoms, verbalize feelings of improved mood/affect, and adopt new coping skills prior to discharge  Outcome: Progressing  Goal: Verbalize thoughts and feelings  Description: Interventions:  - Assess and re-assess patient's level of risk   - Engage patient in 1:1 interactions, daily, for a minimum of 15 minutes   - Encourage patient to express feelings, fears, frustrations, hopes   Outcome: Progressing  Goal: Refrain from harming self  Description: Interventions:  - Monitor patient closely, per order   - Supervise medication ingestion, monitor effects and side effects   Outcome: Progressing  Goal: Refrain from isolation  Description: Interventions:  - Develop a trusting relationship   - Encourage socialization   Outcome: Progressing  Goal: Refrain from self-neglect  Outcome: Progressing  Goal: Attend and participate in unit activities, including therapeutic, recreational, and educational groups  Description: Interventions:  - Provide therapeutic and educational activities daily, encourage attendance and participation, and document same in the medical record   Outcome: Progressing  Goal: Complete daily ADLs, including personal hygiene independently, as able  Description: Interventions:  - Observe, teach, and assist patient with ADLS  -  Monitor and promote a balance of rest/activity, with adequate nutrition and elimination   Outcome: Progressing     Problem: Anxiety  Goal: Anxiety is at manageable level  Description: Interventions:  - Assess and monitor patient's anxiety level.   - Monitor for signs and symptoms (heart palpitations, chest pain, shortness of breath, headaches, nausea, feeling jumpy, restlessness, irritable, apprehensive).   - Collaborate with interdisciplinary team and initiate plan and interventions as ordered.  - [place] patient to unit/surroundings  - Explain treatment plan  - Encourage participation in  care  - Encourage verbalization of concerns/fears  - Identify coping mechanisms  - Assist in developing anxiety-reducing skills  - Administer/offer alternative therapies  - Limit or eliminate stimulants  Outcome: Progressing

## 2024-11-20 NOTE — TREATMENT TEAM
11/19/24 7636   Pain Assessment Post Intervention   Pain Assessment Tool Used: 0-10   Post Intervention Pain Score No Pain   Post Intervention Pain Location/Orientation Location: Back   Response to Interventions effective     Patient reports that ibuprofen 600 mg was effective for pain.

## 2024-11-20 NOTE — PROGRESS NOTES
Progress Note - Behavioral Health   Name: Samantha Rodriguez 58 y.o. female I MRN: [id number]  Unit/Bed#: [facility] 606-01 I Date of Admission: 11/16/2024   Date of Service: 11/20/2024 I Hospital Day: 2    Assessment & Plan  Severe episode of recurrent major depressive disorder, without psychotic features (HCC)  Continue Zoloft 200 mg at bedtime for ongoing symptoms of depression and anxiety  Continue Abilify 5 mg daily for antidepressant augmentation  Generalized anxiety disorder with panic attacks  Continue Zoloft 200 mg at bedtime for ongoing symptoms of depression and anxiety  Continue Lyrica 150 mg twice daily for anxiety and chronic pain  Continue Buspar 20 mg three times daily for generalized anxiety  At this time will avoid benzodiazepine use in the setting of current morphine use  PTSD (post-traumatic stress disorder)  Continue prazosin 2 mg at night for nightmares  Continue ongoing medication management   Lumbar spondylosis  Pain management per medical team  Obesity, morbid (HCC)  management per medical team  Tardive dyskinesia  Continue Ingrezza 60 mg daily  History of CVA (cerebrovascular accident)  management per medical team  Medical clearance for psychiatric admission  management per medical team  Mixed hyperlipidemia  management per medical team  Opioid-induced constipation  management per medical team    Progress Toward Goals: Slow Improvement    Recommended Treatment: Continue with group therapy, milieu therapy and occupational therapy.      Risks, benefits and possible side effects of Medications:   Risks, benefits, and possible side effects of medications explained to patient and patient verbalizes understanding.      History of Present Illness   Behavior over the last 24 hours:  Improved  Sleep: Improving, slept better last night  Appetite: normal  Medication side effects: No  ROS: Remains with ongoing and severe chronic pains unchanged in severity    Subjective:    Per nursing report, on the  "inpatient unit Samantha has continued to make slow progress. She was seen for intake yesterday. Following interview she was observed to be visible in the milieu, social with peers, pleasant and cooperative on interview. On afternoon nursing assessment she continued to remain preoccupied with family and her new grandchild. On afternoon nursing assessment she denied symptoms of depression and denied suicidal ideation and homicidal ideation. She received a PRN of Ativan 1 mg yesterday at 1804 for severe anxiety.    Today, Samantha reports feeling \"better.\" She reports improvements in her depression and anxiety during this current hospitalization. At the time of interview, Samantha added that she felt \"lost.\" She remained somatically preoccupied and continued to ruminate about her ongoing back pain and her upcoming surgery. She expressed fears that she will need additional pain medication following the surgery. Supportive therapy was provided. Samantha reports that she had one breakdown yesterday in the setting of these ruminations and she feels more positive overall today.     At the time of interview today, Samantha reports she has been attending groups. She reports feeling frustrated by coping skills such as deep breathing and distraction techniques, however agrees to work on incorporating them on a more consistent basis.     Samantha reports she had difficulty falling asleep last night, but overall slept better and slept throughout the night. She reports that she has been eating well. She has been taking her psychiatric medications as prescribed and she denies any medication side effects.    At the time of interview today, Samantha reports fleeting suicidal ideation. She adamantly denies any plans to harm herself. She denies homicidal ideation, visual and auditory hallucinations.    Objective   Mental Status Evaluation:  Appearance:  Age appropriate, casually dressed, appropriate grooming and hygiene, fair eye contact, no acute " "distress   Behavior:  Polite, cooperative, less anxious than previous interview   Speech:  Increased rate, normal volume, often circumstantial   Mood:  \"Better\"   Affect:  Constricted, less anxious   Thought Process:  Generally circumstantial   Associations: circumstantial associations   Thought Content:  Remains with ongoing somatic preoccupations surrounding chronic pain   Perceptual Disturbances: Denies visual and auditory hallucinations, does not appear responding to internal stimuli   Risk Potential: Suicidal Ideations - fleeting suicidal ideation without plan to harm herself  Homicidal Ideations - denies   Potential for Aggression - no   Sensorium:  person, place, and time/date   Memory:  recent and remote memory grossly intact   Consciousness:  alert and awake    Attention: attention span appeared shorter than expected for age   Insight:  limited   Judgment: fair   Gait/Station: Stable gait with assistive device    Motor Activity: At the time of interview the patient remains with repetitive and rhythmic involuntary movements of the mouth and tongue. These involuntary movements are improved and less frequent.     Medications: all current active meds have been reviewed and continue current psychiatric medications.      Lab Results: I have reviewed the following results:  Most Recent Labs:   Lab Results   Component Value Date    WBC 4.63 11/19/2024    RBC 4.45 11/19/2024    HGB 12.9 11/19/2024    HCT 39.7 11/19/2024     11/19/2024    RDW 13.2 11/19/2024    NEUTROABS 2.34 11/19/2024    SODIUM 141 11/19/2024    K 3.2 (L) 11/19/2024     11/19/2024    CO2 26 11/19/2024    BUN 15 11/19/2024    CREATININE 0.73 11/19/2024    GLUC 97 11/19/2024    GLUF 97 11/19/2024    CALCIUM 8.5 11/19/2024    AST 17 11/19/2024    ALT 17 11/19/2024    ALKPHOS 79 11/19/2024    TP 6.2 (L) 11/19/2024    ALB 3.8 11/19/2024    TBILI 0.73 11/19/2024    CHOLESTEROL 99 11/19/2024    HDL 49 (L) 11/19/2024    TRIG 40 11/19/2024    " LDLCALC 42 11/19/2024    NONHDLC 50 11/19/2024    LITHIUM <0.1 (L) 03/23/2023    AMMONIA <10 (L) 06/27/2021    [id number] 1.759 11/16/2024    FREET4 0.80 02/13/2020    PREGSERUM Negative 02/22/2014    RPR Non-Reactive 06/29/2021    HGBA1C 4.8 11/18/2024    EAG 91 11/18/2024       Oleksandr Massey MD

## 2024-11-20 NOTE — NURSING NOTE
Anxiety and depression are getting better per patient. Patient is visible in the milieu, clam, pleasant, and social. PRN requested robaxin given for muscle spasm. Patient offers no c/o at the moment. Patient is resting in bed, safety checks maintained.

## 2024-11-20 NOTE — TREATMENT TEAM
11/20/24 1233   Allen Anxiety Scale   Anxious Mood 2   Tension 2   Fears 1   Insomnia 1   Intellectual 2   Depressed Mood 1   Somatic Complaints: Muscular 1   Somatic Complaints: Sensory 1   Cardiovascular Symptoms 0   Respiratory Symptoms 0   Gastrointestinal Symptoms 0   Genitourinary Symptoms 0   Autonomic Symptoms 1   Behavior at Interview 1   Allen Anxiety Score 13     Patient reports anxiety while eating her lunch, PRN Atarax given.

## 2024-11-20 NOTE — ASSESSMENT & PLAN NOTE
Continue Zoloft 200 mg at bedtime for ongoing symptoms of depression and anxiety  Continue Lyrica 150 mg twice daily for anxiety and chronic pain  Continue Buspar 20 mg three times daily for generalized anxiety  At this time will avoid benzodiazepine use in the setting of current morphine use

## 2024-11-20 NOTE — NURSING NOTE
Received bottle of Ingrezza 60 mg capsules with 23 pills inside, at 1700 from [facility] via [facility] . Kimberly MADRID made aware at 1738. Ingrezza bottle hand delivered to pharmacy.

## 2024-11-21 LAB
ANION GAP SERPL CALCULATED.3IONS-SCNC: 6 MMOL/L (ref 4–13)
BUN SERPL-MCNC: 14 MG/DL (ref 5–25)
CALCIUM SERPL-MCNC: 8.3 MG/DL (ref 8.4–10.2)
CHLORIDE SERPL-SCNC: 105 MMOL/L (ref 96–108)
CO2 SERPL-SCNC: 28 MMOL/L (ref 21–32)
CREAT SERPL-MCNC: 0.68 MG/DL (ref 0.6–1.3)
GFR SERPL CREATININE-BSD FRML MDRD: 95 ML/MIN/1.73SQ M
GLUCOSE P FAST SERPL-MCNC: 94 MG/DL (ref 65–99)
GLUCOSE SERPL-MCNC: 94 MG/DL (ref 65–140)
POTASSIUM SERPL-SCNC: 4 MMOL/L (ref 3.5–5.3)
SODIUM SERPL-SCNC: 139 MMOL/L (ref 135–147)

## 2024-11-21 PROCEDURE — 99232 SBSQ HOSP IP/OBS MODERATE 35: CPT | Performed by: PSYCHIATRY & NEUROLOGY

## 2024-11-21 PROCEDURE — 80048 BASIC METABOLIC PNL TOTAL CA: CPT | Performed by: NURSE PRACTITIONER

## 2024-11-21 RX ORDER — TRAZODONE HYDROCHLORIDE 100 MG/1
100 TABLET ORAL
Status: DISCONTINUED | OUTPATIENT
Start: 2024-11-21 | End: 2024-11-26

## 2024-11-21 RX ORDER — TRAZODONE HYDROCHLORIDE 50 MG/1
50 TABLET, FILM COATED ORAL
Status: DISCONTINUED | OUTPATIENT
Start: 2024-11-21 | End: 2024-11-21

## 2024-11-21 RX ADMIN — TRAZODONE HYDROCHLORIDE 100 MG: 100 TABLET ORAL at 21:32

## 2024-11-21 RX ADMIN — Medication 1000 UNITS: at 08:35

## 2024-11-21 RX ADMIN — SERTRALINE HYDROCHLORIDE 200 MG: 100 TABLET ORAL at 21:32

## 2024-11-21 RX ADMIN — BUSPIRONE HYDROCHLORIDE 20 MG: 10 TABLET ORAL at 15:30

## 2024-11-21 RX ADMIN — POLYETHYLENE GLYCOL 3350 17 G: 17 POWDER, FOR SOLUTION ORAL at 08:36

## 2024-11-21 RX ADMIN — AMLODIPINE BESYLATE 5 MG: 5 TABLET ORAL at 08:35

## 2024-11-21 RX ADMIN — MORPHINE SULFATE 15 MG: 15 TABLET ORAL at 12:27

## 2024-11-21 RX ADMIN — PREGABALIN 150 MG: 100 CAPSULE ORAL at 08:35

## 2024-11-21 RX ADMIN — ASPIRIN 81 MG: 81 TABLET, COATED ORAL at 08:36

## 2024-11-21 RX ADMIN — LUBIPROSTONE 8 MCG: 8 CAPSULE, GELATIN COATED ORAL at 21:32

## 2024-11-21 RX ADMIN — ARIPIPRAZOLE 5 MG: 5 TABLET ORAL at 08:35

## 2024-11-21 RX ADMIN — Medication 400 MG: at 08:35

## 2024-11-21 RX ADMIN — ATORVASTATIN CALCIUM 40 MG: 40 TABLET, FILM COATED ORAL at 15:30

## 2024-11-21 RX ADMIN — LUBIPROSTONE 8 MCG: 8 CAPSULE, GELATIN COATED ORAL at 08:35

## 2024-11-21 RX ADMIN — LORATADINE 10 MG: 10 TABLET ORAL at 08:36

## 2024-11-21 RX ADMIN — POLYETHYLENE GLYCOL 3350 17 G: 17 POWDER, FOR SOLUTION ORAL at 21:58

## 2024-11-21 RX ADMIN — BUSPIRONE HYDROCHLORIDE 20 MG: 10 TABLET ORAL at 21:32

## 2024-11-21 RX ADMIN — VALBENAZINE 60 MG: 60 CAPSULE ORAL at 08:37

## 2024-11-21 RX ADMIN — PREGABALIN 150 MG: 100 CAPSULE ORAL at 17:22

## 2024-11-21 RX ADMIN — SENNOSIDES AND DOCUSATE SODIUM 1 TABLET: 50; 8.6 TABLET ORAL at 14:31

## 2024-11-21 RX ADMIN — BUSPIRONE HYDROCHLORIDE 20 MG: 10 TABLET ORAL at 08:36

## 2024-11-21 RX ADMIN — MORPHINE SULFATE 15 MG: 15 TABLET ORAL at 05:34

## 2024-11-21 RX ADMIN — PRAZOSIN HYDROCHLORIDE 2 MG: 1 CAPSULE ORAL at 21:32

## 2024-11-21 RX ADMIN — PANTOPRAZOLE SODIUM 40 MG: 40 TABLET, DELAYED RELEASE ORAL at 05:22

## 2024-11-21 NOTE — PLAN OF CARE
Problem: Ineffective Coping  Goal: Participates in unit activities  Description: Interventions:  - Provide therapeutic environment   - Provide required programming   - Redirect inappropriate behaviors   Outcome: Progressing     Problem: Depression  Goal: Verbalize thoughts and feelings  Description: Interventions:  - Assess and re-assess patient's level of risk   - Engage patient in 1:1 interactions, daily, for a minimum of 15 minutes   - Encourage patient to express feelings, fears, frustrations, hopes   Outcome: Progressing     Problem: Anxiety  Goal: Anxiety is at manageable level  Description: Interventions:  - Assess and monitor patient's anxiety level.   - Monitor for signs and symptoms (heart palpitations, chest pain, shortness of breath, headaches, nausea, feeling jumpy, restlessness, irritable, apprehensive).   - Collaborate with interdisciplinary team and initiate plan and interventions as ordered.  - [place] patient to unit/surroundings  - Explain treatment plan  - Encourage participation in care  - Encourage verbalization of concerns/fears  - Identify coping mechanisms  - Assist in developing anxiety-reducing skills  - Administer/offer alternative therapies  - Limit or eliminate stimulants  Outcome: Progressing

## 2024-11-21 NOTE — NURSING NOTE
Patient was withdrawn to her room lying bed quietly but came out to the milieu for HS snack and was social with select peers. Denies all psych s/s. No behaviors noted. C/o B/L leg pain 9/10, morphine 15 mg given at 2019 was effective. Took her HS medications. Safety checks ongoing.

## 2024-11-21 NOTE — TREATMENT TEAM
11/21/24 0848   Team Meeting   Meeting Type Daily Rounds   Initial Conference Date 11/21/24   Team Members Present   Team Members Present Physician;Nurse;Occupational Therapist;   Physician Team Member Dr Jones, Dr Palafox, Dr Massey, Renae MADRID   Nursing Team Member Julienne   Care Management Team Member Lory   OT Team Member Tyler   Patient/Family Present   Patient Present No   Patient's Family Present No     Endorses depression and anxiety, withdrawn, med compliant, eating well, showered 11/19, attended 3/4 groups   PRN Atarax 25 mg at 1223  PRN Morphine 15 mg at 1023, 2019, 0534  PRN Trazodone 50 mg at 2123

## 2024-11-21 NOTE — PROGRESS NOTES
11/21/24 0800 11/21/24 1000   Activity/Group Checklist   Group Exercise Community meeting   Attendance Did not attend Did not attend

## 2024-11-21 NOTE — TREATMENT TEAM
11/21/24 1541   Team Meeting   Meeting Type Tx Team Meeting   Initial Conference Date 11/20/24   Team Members Present   Team Members Present Physician;Nurse;   Physician Team Member Dr Jones   Nursing Team Member Katherine   Care Management Team Member Lory   Patient/Family Present   Patient Present Yes   Patient's Family Present No     Treatment plan and goals reviewed with pt. Pt in agreement and signed,.

## 2024-11-21 NOTE — CASE MANAGEMENT
Henrry referral sent to [facility] with request for pt's return. CM requested facility to notify if Aging Options will be required for pt's return.     HUNTER rec'd response from [facility] admissions, stating pt is able to return. Isabella will be checking with administration if Options will be required.

## 2024-11-21 NOTE — TREATMENT TEAM
Pt lat to group and fixated that she could not hear others in group and needing hearing checked.  Advised Nursing (headset/amplifier to be tried)  Pt did note she could hear better half way through group without headset.  Pt fixated on when she does not like others and getting even with negative self talk.      11/21/24 1330   Activity/Group Checklist   Group Other (Comment)  (Take action, strengths, gratitude and goals)   Attendance Attended;Other (Comment)  (late)   Attendance Duration (min) 31-45   Interactions Other (Comment)  (needed to speak closer to pt)   Affect/Mood Wide;Incongruent   Goals Achieved Identified feelings;Identified triggers;Discussed coping strategies;Able to engage in interactions;Able to listen to others;Able to reflect/comment on own behavior;Verbalized increased hopefulness;Able to manage/cope with feelings;Able to self-disclose

## 2024-11-21 NOTE — PROGRESS NOTES
Progress Note - Behavioral Health   Name: Samantha Rodriguez 58 y.o. female I MRN: [id number]  Unit/Bed#: [facility] 606-01 I Date of Admission: 11/16/2024   Date of Service: 11/21/2024 I Hospital Day: 3    Assessment & Plan  Severe episode of recurrent major depressive disorder, without psychotic features (HCC)  Continue Zoloft 200 mg at bedtime for ongoing symptoms of depression and anxiety  Continue Abilify 5 mg daily for antidepressant augmentation  Generalized anxiety disorder with panic attacks  Continue Zoloft 200 mg at bedtime for ongoing symptoms of depression and anxiety  Continue Lyrica 150 mg twice daily for anxiety and chronic pain  Continue Buspar 20 mg three times daily for generalized anxiety  At this time will avoid benzodiazepine use in the setting of current morphine use  PTSD (post-traumatic stress disorder)  Continue prazosin 2 mg at night for nightmares  Continue ongoing medication management   Lumbar spondylosis  Pain management per medical team  Obesity, morbid (HCC)  management per medical team  Tardive dyskinesia  Continue Ingrezza 60 mg daily  History of CVA (cerebrovascular accident)  management per medical team  Medical clearance for psychiatric admission  management per medical team  Mixed hyperlipidemia  management per medical team  Opioid-induced constipation  management per medical team  Mood insomnia (HCC)  Adjusted trazodone to 100 mg at bedtime for insomnia    Progress Toward Goals: Slow improvements    Recommended Treatment: Continue with group therapy, milieu therapy and occupational therapy.      Risks, benefits and possible side effects of Medications:   Risks, benefits, and possible side effects of medications explained to patient and patient verbalizes understanding.      History of Present Illness   Behavior over the last 24 hours:  improved  Sleep: Patient reports difficulty falling asleep last night and was given a as needed of trazodone 50 mg a 2123, which was  "effective  Appetite: normal  Medication side effects: No  ROS: Patient remains with chronic pain symptoms unchanged from baseline    Subjective:    Per nursing report, on the inpatient psychiatric unit patient continues to make slow progress.  Yesterday she was noted to be visible in the milieu, attending groups and participating appropriately.  She met with physical therapy yesterday for assessment yesterday afternoon.  Yesterday afternoon she attended groups and interacted appropriately.  On evening nursing assessment she reported improvements in her symptoms of depression and anxiety.  On evening nursing assessment she was noted to be visible in the milieu, calm, pleasant, and social. Patient reports difficulty falling asleep last night and was given a as needed of trazodone 50 mg a 2123, which was effective.  On the inpatient unit patient has been medication and meal compliant.    Today, Samantha reports feeling \"more anxious.\"  She reports that she has continued to have fluctuations in her chronic pain and reports that today she is experiencing severe generalized pain (back pain, shoulder pain, leg pain, knee pain).  She reports that her pain symptoms are exacerbated by the weather.  At the time of interview, she catastrophizes that, in the context of pain, she will have significant difficulties returning to the rehab center and attending to activities of daily living.  Supportive therapy was provided.    Samantha reports that she has been spending her time on the inpatient unit attending groups.  She reports that she has not been in contact with her family since arriving to the inpatient unit.    Today, Samantha reports that she continues to have ongoing difficulty falling asleep.  She reports that trazodone has been effective to help her sleep and she was agreeable to starting on trazodone 100 mg at bedtime for nighttime anxiety as well as insomnia.    At the time of interview, Samantha denies suicidal ideation, " "homicidal ideation, visual and auditory hallucinations.    Objective   Mental Status Evaluation:  Appearance:  Age appropriate, casually dressed, appropriate grooming and hygiene, fair eye contact, no acute distress   Behavior:  Polite, cooperative   Speech:  Dysarthric, normal volume, increased rate, often circumstantial   Mood:  \"More anxious\"   Affect:  Anxious   Thought Process:  Remains generally circumstantial in thought process   Associations: circumstantial associations   Thought Content:  Remains with ongoing somatic preoccupations surrounding chronic pain   Perceptual Disturbances: Denies visual and auditory hallucinations, does not appear responding to internal stimuli   Risk Potential: Suicidal Ideations-none  Homicidal Ideations-none  Potential for Aggression-no   Sensorium:  person, place, and time/date   Memory:  recent and remote memory grossly intact   Consciousness:  alert and awake    Attention: attention span appeared shorter than expected for age   Insight:  limited   Judgment: fair   Gait/Station: Stable gait with assistive device   Motor Activity: Patient remains with repetitive and rhythmic involuntary movements of the mouth and tongue.  There are no observed changes in these involuntary movements when compared with examination yesterday.     Medications: all current active meds have been reviewed.      Lab Results: I have reviewed the following results:  Most Recent Labs:   Lab Results   Component Value Date    WBC 4.63 11/19/2024    RBC 4.45 11/19/2024    HGB 12.9 11/19/2024    HCT 39.7 11/19/2024     11/19/2024    RDW 13.2 11/19/2024    NEUTROABS 2.34 11/19/2024    SODIUM 139 11/21/2024    K 4.0 11/21/2024     11/21/2024    CO2 28 11/21/2024    BUN 14 11/21/2024    CREATININE 0.68 11/21/2024    GLUC 94 11/21/2024    GLUF 94 11/21/2024    CALCIUM 8.3 (L) 11/21/2024    AST 17 11/19/2024    ALT 17 11/19/2024    ALKPHOS 79 11/19/2024    TP 6.2 (L) 11/19/2024    ALB 3.8 11/19/2024 "    TBILI 0.73 11/19/2024    CHOLESTEROL 99 11/19/2024    HDL 49 (L) 11/19/2024    TRIG 40 11/19/2024    LDLCALC 42 11/19/2024    NONHDLC 50 11/19/2024    LITHIUM <0.1 (L) 03/23/2023    AMMONIA <10 (L) 06/27/2021    [id number] 1.759 11/16/2024    FREET4 0.80 02/13/2020    PREGSERUM Negative 02/22/2014    RPR Non-Reactive 06/29/2021    HGBA1C 4.8 11/18/2024    EAG 91 11/18/2024       Oleksandr Massey MD

## 2024-11-21 NOTE — NURSING NOTE
PRN Senna and prune juice given for constipation. Patient ios visible in the milieu, calm, pleasant on approach. Patient continues to endorse anxiety and depression r/t pain. No further distress noted. Safety checks maintained.

## 2024-11-21 NOTE — TREATMENT TEAM
11/21/24 1227   Pain Assessment   Pain Assessment Tool 0-10   Pain Score 9   Pain Location/Orientation Location: [facility]   Hospital Pain Intervention(s) Medication (See MAR)

## 2024-11-22 PROCEDURE — 99232 SBSQ HOSP IP/OBS MODERATE 35: CPT | Performed by: PSYCHIATRY & NEUROLOGY

## 2024-11-22 RX ADMIN — PREGABALIN 150 MG: 100 CAPSULE ORAL at 17:11

## 2024-11-22 RX ADMIN — Medication 400 MG: at 08:21

## 2024-11-22 RX ADMIN — LUBIPROSTONE 8 MCG: 8 CAPSULE, GELATIN COATED ORAL at 08:21

## 2024-11-22 RX ADMIN — VALBENAZINE 60 MG: 60 CAPSULE ORAL at 08:24

## 2024-11-22 RX ADMIN — MORPHINE SULFATE 15 MG: 15 TABLET ORAL at 18:01

## 2024-11-22 RX ADMIN — ATORVASTATIN CALCIUM 40 MG: 40 TABLET, FILM COATED ORAL at 17:11

## 2024-11-22 RX ADMIN — PREGABALIN 150 MG: 100 CAPSULE ORAL at 08:21

## 2024-11-22 RX ADMIN — PRAZOSIN HYDROCHLORIDE 2 MG: 1 CAPSULE ORAL at 21:27

## 2024-11-22 RX ADMIN — LUBIPROSTONE 8 MCG: 8 CAPSULE, GELATIN COATED ORAL at 21:27

## 2024-11-22 RX ADMIN — MORPHINE SULFATE 15 MG: 15 TABLET ORAL at 09:29

## 2024-11-22 RX ADMIN — ASPIRIN 81 MG: 81 TABLET, COATED ORAL at 08:24

## 2024-11-22 RX ADMIN — AMLODIPINE BESYLATE 5 MG: 5 TABLET ORAL at 08:22

## 2024-11-22 RX ADMIN — TRAZODONE HYDROCHLORIDE 100 MG: 100 TABLET ORAL at 21:27

## 2024-11-22 RX ADMIN — LORATADINE 10 MG: 10 TABLET ORAL at 08:22

## 2024-11-22 RX ADMIN — MORPHINE SULFATE 15 MG: 15 TABLET ORAL at 00:12

## 2024-11-22 RX ADMIN — SERTRALINE HYDROCHLORIDE 200 MG: 100 TABLET ORAL at 21:27

## 2024-11-22 RX ADMIN — BUSPIRONE HYDROCHLORIDE 20 MG: 10 TABLET ORAL at 21:27

## 2024-11-22 RX ADMIN — BUSPIRONE HYDROCHLORIDE 20 MG: 10 TABLET ORAL at 17:11

## 2024-11-22 RX ADMIN — BUSPIRONE HYDROCHLORIDE 20 MG: 10 TABLET ORAL at 08:21

## 2024-11-22 RX ADMIN — ARIPIPRAZOLE 5 MG: 5 TABLET ORAL at 08:22

## 2024-11-22 RX ADMIN — Medication 1000 UNITS: at 08:21

## 2024-11-22 RX ADMIN — METHOCARBAMOL TABLETS 500 MG: 500 TABLET, COATED ORAL at 12:19

## 2024-11-22 RX ADMIN — PANTOPRAZOLE SODIUM 40 MG: 40 TABLET, DELAYED RELEASE ORAL at 06:21

## 2024-11-22 RX ADMIN — SENNOSIDES AND DOCUSATE SODIUM 1 TABLET: 50; 8.6 TABLET ORAL at 08:22

## 2024-11-22 RX ADMIN — POLYETHYLENE GLYCOL 3350 17 G: 17 POWDER, FOR SOLUTION ORAL at 08:21

## 2024-11-22 NOTE — NURSING NOTE
Patient continues to endorse anxiety and depression, denies SI, AH, VH. Patient is calm, social, c/o muscle spasm. PRN Robaxin given with effective effect. PRN senna, given with scheduled miralax, and prune juice for constipation. Patient reports PRN effective. Patient is visible in the milieu intermittently. Patient is alert and oriented, able to make needs known. Safety checks ongoing.

## 2024-11-22 NOTE — NURSING NOTE
"At 1600 patient found in bedroom breathing quickly. Stated, \"I might be having a panic attack.\" VSS. Routine buspirone 20 mg administered. Reassurance provided. Patient's breathing slowed. Withdrawn to self for the rest of the shift. Calm and cooperative on approach. Medication compliant.   "

## 2024-11-22 NOTE — NURSING NOTE
Patient requested and was given PRN morphine 15 mg for back pain 10/10. Patient re-assessed after 1hr, medication was effective.

## 2024-11-22 NOTE — TREATMENT TEAM
11/22/24 1804   Pain Assessment   Pain Assessment Tool 0-10   Pain Score 10 - Worst Possible Pain   Pain Location/Orientation Location: Generalized   Hospital Pain Intervention(s) Medication (See MAR)     Morphine given for generalized body pain

## 2024-11-22 NOTE — PLAN OF CARE
Pt. Not attending any of 3 scheduled groups today.  Problem: Ineffective Coping  Goal: Participates in unit activities  Description: Interventions:  - Provide therapeutic environment   - Provide required programming   - Redirect inappropriate behaviors   Outcome: Not Progressing

## 2024-11-22 NOTE — ASSESSMENT & PLAN NOTE
Zoloft 200 mg at bedtime for ongoing symptoms of depression and anxiety  Increase Abilify to 10 mg daily for antidepressant augmentation (increased 11/23/2024)    Legal Status: 201  Expected Discharge: 11/27/2024

## 2024-11-22 NOTE — TREATMENT TEAM
11/22/24 0902   Team Meeting   Meeting Type Daily Rounds   Initial Conference Date 11/22/24   Team Members Present   Team Members Present Physician;Nurse;   Physician Team Member Dr Jones, Dr Palafox   Nursing Team Member [place]   Care Management Team Member Lory   Patient/Family Present   Patient Present No   Patient's Family Present No     Withdrawn, reported panic attack, redirectable, eating well, med compliant, given Miralax, senna and prune juice awaiting effect, Morphine for pain. Trazadone now scheduled, slept

## 2024-11-22 NOTE — PLAN OF CARE
Problem: Depression  Goal: Treatment Goal: Demonstrate behavioral control of depressive symptoms, verbalize feelings of improved mood/affect, and adopt new coping skills prior to discharge  Outcome: Progressing     Problem: Anxiety  Goal: Anxiety is at manageable level  Description: Interventions:  - Assess and monitor patient's anxiety level.   - Monitor for signs and symptoms (heart palpitations, chest pain, shortness of breath, headaches, nausea, feeling jumpy, restlessness, irritable, apprehensive).   - Collaborate with interdisciplinary team and initiate plan and interventions as ordered.  - [place] patient to unit/surroundings  - Explain treatment plan  - Encourage participation in care  - Encourage verbalization of concerns/fears  - Identify coping mechanisms  - Assist in developing anxiety-reducing skills  - Administer/offer alternative therapies  - Limit or eliminate stimulants  Outcome: Progressing     Problem: Ineffective Coping  Goal: Identifies healthy coping skills  Outcome: Progressing

## 2024-11-22 NOTE — PROGRESS NOTES
Progress Note - Behavioral Health   Name: Samantha Rodriguez 58 y.o. female I MRN: [id number]  Unit/Bed#: [facility] 606-01 I Date of Admission: 11/16/2024   Date of Service: 11/22/2024 I Hospital Day: 4     Assessment & Plan  Severe episode of recurrent major depressive disorder, without psychotic features (HCC)  Continue Zoloft 200 mg at bedtime for ongoing symptoms of depression and anxiety  Continue Abilify 5 mg daily for antidepressant augmentation  Generalized anxiety disorder with panic attacks  Continue Zoloft 200 mg at bedtime for ongoing symptoms of depression and anxiety  Continue Lyrica 150 mg twice daily for anxiety and chronic pain  Continue Buspar 20 mg three times daily for generalized anxiety  At this time will avoid benzodiazepine use in the setting of current morphine use  PTSD (post-traumatic stress disorder)  Continue prazosin 2 mg at night for nightmares  Continue ongoing medication management   Lumbar spondylosis  Pain management per medical team  Obesity, morbid (HCC)  management per medical team  Tardive dyskinesia  Continue Ingrezza 60 mg daily  History of CVA (cerebrovascular accident)  management per medical team  Medical clearance for psychiatric admission  management per medical team  Mixed hyperlipidemia  management per medical team  Opioid-induced constipation  management per medical team  Mood insomnia (HCC)  Trazodone 100 mg at bedtime for insomnia    PRN medications over the past 24 hours:    Morphine 15mg x2, Robaxin 500mg, Miralax 17g, Senna x2    Progress toward goals:    Slow progress, tolerating increased dose of trazodone, some improvement in sleep and mood, continued anxiety/panic, continues to require inpatient psychiatric hospitalization    Discharge disposition:   201, discharge TBD pending improvement in symptoms    Recommended Treatment:      - Encourage early mobility and having a structured day  - Provide frequent re-orientation, and cognitive stimulation  - Ensure assistive  devices are in proper working order (eye-glasses, hearing aids)  - Encourage adequate hydration, nutrition and monitor bowel movements  - Maintain sleep-wake cycle: Uninterrupted sleep time; low-level lighting at night  - Fall precaution  - Encourage group therapy, milieu therapy, and occupational therapy  - Behavioral Health checks every 15 minutes  - Medical management per SLIM    Risks, benefits and possible side effects of Medications:   Risks, benefits, and possible side effects of medications explained to patient and patient verbalizes understanding.      SUBJECTIVE:  The patient was evaluated today for continuity of care and no acute distress noted throughout the evaluation.  The patient was found laying in bed.  She was able to share that she had a panic attack yesterday, but was able to get through it with use of her standing medications and coping skills.  States when she gets these panic attacks, it feels like someone is smothering her with a pillow.  Patient appears anxious and depressed during assessment.  She does endorse slightly improved sleep with Trazodone, but reports that the pain does tend to wake her up in the middle of the night.  We spoke about the dynamics on the unit and patient reports that she gets upset when certain peers interrupt or bother her.  She states that she would defend herself if someone were to come after he, but has no desire to hurt or harm others.  Oral/buccal involuntary TD movements apparent on assessment.  Patient with increased pain and leg spasms during assessment.      Samantha denies acute suicidal/self-harm ideation/intent/plan upon direct inquiry at this time.  Samantha is able to contract for safety and is willing to seek staff support should suicidal/self-harm symptoms or urges worsen.  Patient denies AH/VH/HI but does have hostile thoughts towards other people on the unit.  She is adherent to her current medication regimen.  Denies side effects.  Still struggling  with lack of BM, patient provided with PRN Miralax and Senna.    Psychiatric Review of Systems:  Behavior over the last 24 hours:  unchanged  Sleep:  slightly better with 100mg of standing Trazodone  Appetite: fair  Medication side effects: No   ROS: reports leg pain and shoulder pain, all other systems are negative    OBJECTIVE:  Current Medications:  Current Facility-Administered Medications   Medication Dose Route Frequency Provider Last Rate    acetaminophen  650 mg Oral Q4H PRN MERCY Sanchez      acetaminophen  650 mg Oral Q4H PRN MERCY Sanchez      aluminum-magnesium hydroxide-simethicone  30 mL Oral Q4H PRN MERCY Sanchez      amLODIPine  5 mg Oral Daily Anika Harris, MERCY      ARIPiprazole  5 mg Oral Daily Oleksandr Massey MD      aspirin  81 mg Oral Daily Anika Harris, MERCY      atorvastatin  40 mg Oral Daily With Dinner MERCY Kim      benztropine  1 mg Intramuscular Q4H PRN Max 6/day MERCY Sanchez      benztropine  0.5 mg Oral Q4H PRN Max 6/day MERCY Sanchez      bisacodyl  10 mg Rectal Daily PRN MERCY Sanchez      busPIRone  20 mg Oral TID Oleksandr Massey MD      Cholecalciferol  1,000 Units Oral Daily MERCY Kim      cyclobenzaprine  10 mg Oral TID PRN MERCY Kim      hydrOXYzine HCL  100 mg Oral Q6H PRN Oleksandr Massey MD      hydrOXYzine HCL  25 mg Oral Q6H PRN Max 4/day MERCY Sanchez      hydrOXYzine HCL  50 mg Oral Q6H PRN Max 4/day MERCY Sanchez      ibuprofen  600 mg Oral Q8H PRN MERCY Kim      loratadine  10 mg Oral Daily MERCY Kim      LORazepam  1 mg Intramuscular Q6H PRN Max 3/day MERCY Sanchez      lubiprostone  8 mcg Oral BID MERCY Kim      magnesium Oxide  400 mg Oral Daily MERCY Kim      methocarbamol  500 mg Oral Q6H PRN MERCY Kim      morphine  15  mg Oral Q6H PRN Renae Galvez, MERCY      OLANZapine  5 mg Intramuscular Q3H PRN Max 3/day Renaeangy Galvez, MERCY      OLANZapine  2.5 mg Oral Q4H PRN Max 6/day Renaeangy Galvez, MERCY      OLANZapine  5 mg Oral Q4H PRN Max 3/day Renae Galvez, MERCY      OLANZapine  5 mg Oral Q3H PRN Max 3/day Renaeangy Galvez, MERCY      pantoprazole  40 mg Oral Early Morning Anika Coxpiedad Harris, MERCY      polyethylene glycol  17 g Oral Daily PRN Renae Leonor, MERCY      polyethylene glycol  17 g Oral Daily Anika Coxpiedad Harris, MERCY      prazosin  2 mg Oral HS Oleksandr Massey MD      pregabalin  150 mg Oral BID Oleksandr Massey MD      propranolol  5 mg Oral Q8H PRN Renae MERCY Galvez      senna-docusate sodium  1 tablet Oral Daily PRN Renae Leonor, MERCY      sertraline  200 mg Oral HS Oleksandr Massey MD      traZODone  100 mg Oral HS Oleksandr Massey MD      Valbenazine Tosylate  60 mg Oral Daily MERCY Kim       Vital signs in last 24 hours:  Temp:  [97 °F (36.1 °C)-97.9 °F (36.6 °C)] 97 °F (36.1 °C)  HR:  [70-85] 76  BP: (123-136)/(59-86) 134/76  Resp:  [18] 18  SpO2:  [95 %-99 %] 98 %  O2 Device: None (Room air)    Laboratory results:  I have personally reviewed all pertinent laboratory/tests results.  Most Recent Labs:   Lab Results   Component Value Date    WBC 4.63 11/19/2024    RBC 4.45 11/19/2024    HGB 12.9 11/19/2024    HCT 39.7 11/19/2024     11/19/2024    RDW 13.2 11/19/2024    TOTANEUTABS 0.83 (L) 05/25/2023    NEUTROABS 2.34 11/19/2024    SODIUM 139 11/21/2024    K 4.0 11/21/2024     11/21/2024    CO2 28 11/21/2024    BUN 14 11/21/2024    CREATININE 0.68 11/21/2024    GLUC 94 11/21/2024    GLUF 94 11/21/2024    CALCIUM 8.3 (L) 11/21/2024    AST 17 11/19/2024    ALT 17 11/19/2024    ALKPHOS 79 11/19/2024    TP 6.2 (L) 11/19/2024    ALB 3.8 11/19/2024    TBILI 0.73 11/19/2024    CHOLESTEROL 99 11/19/2024    HDL 49 (L) 11/19/2024    TRIG 40 11/19/2024     LDLCALC 42 11/19/2024    NONHDLC 50 11/19/2024    LITHIUM <0.1 (L) 03/23/2023    AMMONIA <10 (L) 06/27/2021    [id number] 1.759 11/16/2024    FREET4 0.80 02/13/2020    PREGSERUM Negative 02/22/2014    RPR Non-Reactive 06/29/2021    HGBA1C 4.8 11/18/2024    EAG 91 11/18/2024     Mental Status Exam:  Appearance:  casually dressed, marginal hygiene, overweight   Behavior:  cooperative, restless, responds to redirection   Speech:  increased rate, dysarthric due to involuntary oral movements   Mood:  depressed, anxious   Affect:  anxious, constricted   Thought Process:  perseverative   Associations: perseveration   Thought Content:  no overt delusions, negative thoughts, ruminating thoughts   Perceptual Disturbances: denies auditory hallucinations when asked, does not appear responding to internal stimuli   Risk Potential: Suicidal ideation - None at present, contracts for safety on the unit, would talk to staff if not feeling safe on the unit  Homicidal ideation - angry, hostile feelings with no homicidal plan  Potential for aggression - Not at present   Sensorium:  oriented to person, place, and time/date   Memory:  recent and remote memory grossly intact   Consciousness:  alert and awake   Attention/Concentration: attention span and concentration appear shorter than expected for age   Insight:  limited   Judgment: limited   Gait/Station: slow gait, uses walker   Motor Activity: abnormal movement noted: dyskinetic face movements present     MERCY Sanchez    This note was not shared with the patient due to reasonable likelihood of causing patient harm    This note was completed in part utilizing Dragon dictation Software. Grammatical, translation, syntax errors, random word insertions, spelling mistakes, and incomplete sentences may be an occasional consequence of this system secondary to software limitations with voice recognition, ambient noise, and hardware issues. If you have any questions or concerns about  the content, text, or information contained within the body of this dictation, please contact the provider for clarification.

## 2024-11-23 PROCEDURE — 99232 SBSQ HOSP IP/OBS MODERATE 35: CPT | Performed by: PSYCHIATRY & NEUROLOGY

## 2024-11-23 RX ORDER — ARIPIPRAZOLE 10 MG/1
10 TABLET ORAL DAILY
Status: DISCONTINUED | OUTPATIENT
Start: 2024-11-24 | End: 2024-12-02 | Stop reason: HOSPADM

## 2024-11-23 RX ORDER — ARIPIPRAZOLE 5 MG/1
5 TABLET ORAL ONCE
Status: COMPLETED | OUTPATIENT
Start: 2024-11-23 | End: 2024-11-23

## 2024-11-23 RX ADMIN — MORPHINE SULFATE 15 MG: 15 TABLET ORAL at 09:26

## 2024-11-23 RX ADMIN — LUBIPROSTONE 8 MCG: 8 CAPSULE, GELATIN COATED ORAL at 21:40

## 2024-11-23 RX ADMIN — MORPHINE SULFATE 15 MG: 15 TABLET ORAL at 17:03

## 2024-11-23 RX ADMIN — PREGABALIN 150 MG: 100 CAPSULE ORAL at 09:17

## 2024-11-23 RX ADMIN — VALBENAZINE 60 MG: 60 CAPSULE ORAL at 09:18

## 2024-11-23 RX ADMIN — AMLODIPINE BESYLATE 5 MG: 5 TABLET ORAL at 09:18

## 2024-11-23 RX ADMIN — LUBIPROSTONE 8 MCG: 8 CAPSULE, GELATIN COATED ORAL at 09:17

## 2024-11-23 RX ADMIN — ASPIRIN 81 MG: 81 TABLET, COATED ORAL at 09:17

## 2024-11-23 RX ADMIN — BUSPIRONE HYDROCHLORIDE 20 MG: 10 TABLET ORAL at 17:02

## 2024-11-23 RX ADMIN — PANTOPRAZOLE SODIUM 40 MG: 40 TABLET, DELAYED RELEASE ORAL at 05:37

## 2024-11-23 RX ADMIN — POLYETHYLENE GLYCOL 3350 17 G: 17 POWDER, FOR SOLUTION ORAL at 18:05

## 2024-11-23 RX ADMIN — PRAZOSIN HYDROCHLORIDE 2 MG: 1 CAPSULE ORAL at 21:40

## 2024-11-23 RX ADMIN — HYDROXYZINE HYDROCHLORIDE 50 MG: 50 TABLET, FILM COATED ORAL at 10:45

## 2024-11-23 RX ADMIN — ARIPIPRAZOLE 5 MG: 5 TABLET ORAL at 09:17

## 2024-11-23 RX ADMIN — TRAZODONE HYDROCHLORIDE 100 MG: 100 TABLET ORAL at 21:41

## 2024-11-23 RX ADMIN — LORATADINE 10 MG: 10 TABLET ORAL at 09:18

## 2024-11-23 RX ADMIN — SERTRALINE HYDROCHLORIDE 200 MG: 100 TABLET ORAL at 21:41

## 2024-11-23 RX ADMIN — IBUPROFEN 600 MG: 600 TABLET, FILM COATED ORAL at 23:04

## 2024-11-23 RX ADMIN — Medication 400 MG: at 09:17

## 2024-11-23 RX ADMIN — METHOCARBAMOL TABLETS 500 MG: 500 TABLET, COATED ORAL at 05:37

## 2024-11-23 RX ADMIN — BUSPIRONE HYDROCHLORIDE 20 MG: 10 TABLET ORAL at 09:18

## 2024-11-23 RX ADMIN — Medication 1000 UNITS: at 09:20

## 2024-11-23 RX ADMIN — BUSPIRONE HYDROCHLORIDE 20 MG: 10 TABLET ORAL at 21:40

## 2024-11-23 RX ADMIN — IBUPROFEN 600 MG: 600 TABLET, FILM COATED ORAL at 10:48

## 2024-11-23 RX ADMIN — ARIPIPRAZOLE 5 MG: 5 TABLET ORAL at 12:05

## 2024-11-23 RX ADMIN — ALUMINUM HYDROXIDE, MAGNESIUM HYDROXIDE, AND DIMETHICONE 30 ML: 200; 20; 200 SUSPENSION ORAL at 21:41

## 2024-11-23 RX ADMIN — POLYETHYLENE GLYCOL 3350 17 G: 17 POWDER, FOR SOLUTION ORAL at 09:19

## 2024-11-23 RX ADMIN — PREGABALIN 150 MG: 100 CAPSULE ORAL at 17:02

## 2024-11-23 RX ADMIN — MORPHINE SULFATE 15 MG: 15 TABLET ORAL at 02:12

## 2024-11-23 RX ADMIN — ATORVASTATIN CALCIUM 40 MG: 40 TABLET, FILM COATED ORAL at 17:02

## 2024-11-23 NOTE — ASSESSMENT & PLAN NOTE
Zoloft 200 mg at bedtime for ongoing symptoms of depression and anxiety  Lyrica 150 mg twice daily for anxiety and chronic pain  Buspar 20 mg three times daily for generalized anxiety  At this time will avoid benzodiazepine use in the setting of current morphine use

## 2024-11-23 NOTE — NURSING NOTE
Pt requested atarax for anxiety related experiencing pain currently. Pt accepted atarax po for moderate anxiety with positive result, pt reported having very small amount of anxiety at current time.

## 2024-11-23 NOTE — NURSING NOTE
Pt reported 9/10 generalized pain, received morphine IR with partial effective. Pt reported reduction in pain to 7/10. Miralax prn provided upon request, will monitor.

## 2024-11-23 NOTE — PROGRESS NOTES
Progress Note - Behavioral Health   Name: Samantha Rodriguez 58 y.o. female I MRN: [id number]  Unit/Bed#: [facility] 606-01 I Date of Admission: 11/16/2024   Date of Service: 11/23/2024 I Hospital Day: 5     Assessment & Plan  Severe episode of recurrent major depressive disorder, without psychotic features (HCC)  Zoloft 200 mg at bedtime for ongoing symptoms of depression and anxiety  Increase Abilify to 10 mg daily for antidepressant augmentation (increased 11/23/2024)    Legal Status: 201  Expected Discharge: 11/27/2024  Generalized anxiety disorder with panic attacks  Zoloft 200 mg at bedtime for ongoing symptoms of depression and anxiety  Lyrica 150 mg twice daily for anxiety and chronic pain  Buspar 20 mg three times daily for generalized anxiety  At this time will avoid benzodiazepine use in the setting of current morphine use  PTSD (post-traumatic stress disorder)  Continue prazosin 2 mg at night for nightmares  Continue ongoing medication management   Lumbar spondylosis  Pain management per medical team  Obesity, morbid (HCC)  management per medical team  Tardive dyskinesia  Continue Ingrezza 60 mg daily  History of CVA (cerebrovascular accident)  management per medical team  Medical clearance for psychiatric admission  management per medical team  Mixed hyperlipidemia  management per medical team  Opioid-induced constipation  management per medical team  Mood insomnia (HCC)  Trazodone 100 mg at bedtime for insomnia    PRN medications over the past 24 hours:    Atarax 50 mg, ibuprofen, Robaxin, morphine x 4, Senokot    Progress toward goals:    Unchanged, increased anxiety in bed mood, increasing Abilify for mood stabilization, continues to require inpatient psychiatric hospitalization    Discharge disposition:   201, discharge to be determined pending improvement in symptoms    Recommended Treatment:      - Encourage early mobility and having a structured day  - Provide frequent re-orientation, and cognitive  "stimulation  - Ensure assistive devices are in proper working order (eye-glasses, hearing aids)  - Encourage adequate hydration, nutrition and monitor bowel movements  - Maintain sleep-wake cycle: Uninterrupted sleep time; low-level lighting at night  - Fall precaution  - Encourage group therapy, milieu therapy, and occupational therapy  - Behavioral Health checks every 15 minutes  - Medical management per SLIM    Risks, benefits and possible side effects of Medications:   Risks, benefits, and possible side effects of medications explained to patient and patient verbalizes understanding.            SUBJECTIVE:  The patient was evaluated today for continuity of care and no acute distress noted throughout the evaluation.  The patient was irritable on approach, lying in bed, reports mood is \"not good\" secondary to worsening pain, increased anxiety, and agitation.  We discussed increasing Abilify for improved control of mood stabilization and patient was in agreement with plan.  Patient reports sleep was off and on with \"vivid dreams.\"  She endorsed adequate appetite, low energy.  She denied suicidal or homicidal ideation.  She denied auditory or visual hallucinations.  Patient denied adverse effects from her medication and was in agreement with plan to increase Abilify.      Psychiatric Review of Systems:  Behavior over the last 24 hours:  unchanged  Sleep: slept off and on, vivid dreams  Appetite: normal  Medication side effects: No   ROS: reports back pain, all other systems are negative    OBJECTIVE:  Current Medications:  Current Facility-Administered Medications   Medication Dose Route Frequency Provider Last Rate    acetaminophen  650 mg Oral Q4H PRN MERCY Sanchez      acetaminophen  650 mg Oral Q4H PRN MERCY Sanchez      aluminum-magnesium hydroxide-simethicone  30 mL Oral Q4H PRN MERCY Sanchez      amLODIPine  5 mg Oral Daily MERCY Kim      [START ON 11/24/2024] ARIPiprazole  " 10 mg Oral Daily Sagar Bucca, DO      aspirin  81 mg Oral Daily Anika Thaogeorge, CRNP      atorvastatin  40 mg Oral Daily With Dinner Anika Coxpiedad Harris, BOBBYNP      benztropine  1 mg Intramuscular Q4H PRN Max 6/day Renae Galvez, CRNP      benztropine  0.5 mg Oral Q4H PRN Max 6/day Renae Galvez, CRNP      bisacodyl  10 mg Rectal Daily PRN Renae Galvez, BOBBYNP      busPIRone  20 mg Oral TID Oleksandr Massey MD      Cholecalciferol  1,000 Units Oral Daily Anika Orozcodelphine, BOBBYNP      cyclobenzaprine  10 mg Oral TID PRN Anika Geneva Harris, MERCY      Diclofenac Sodium  2 g Topical 4x Daily PRN Sandra Dixon, BOBBYNP      hydrOXYzine HCL  100 mg Oral Q6H PRN Oleksandr Msasey MD      hydrOXYzine HCL  25 mg Oral Q6H PRN Max 4/day Renae Galvez, MERCY      hydrOXYzine HCL  50 mg Oral Q6H PRN Max 4/day Renae Galvez, BOBBYNP      ibuprofen  600 mg Oral Q8H PRN Anika Orozcodelphine, BOBBYNP      loratadine  10 mg Oral Daily Anika Bean Kimberly, BOBBYNP      LORazepam  1 mg Intramuscular Q6H PRN Max 3/day Renae Galvez, BOBBYNP      lubiprostone  8 mcg Oral BID Anika Orozcodelphine, BOBBYNP      magnesium Oxide  400 mg Oral Daily Anika Bean Kimberly, MERCY      methocarbamol  500 mg Oral Q6H PRN Anika Bean Kimberly, BOBBYNP      morphine  15 mg Oral Q6H PRN Renae Galvez, BOBBYNP      OLANZapine  5 mg Intramuscular Q3H PRN Max 3/day Renae Galvez, BOBBYNP      OLANZapine  2.5 mg Oral Q4H PRN Max 6/day BOBBY SanchezNP      OLANZapine  5 mg Oral Q4H PRN Max 3/day Renae Galvez, BOBBYNP      OLANZapine  5 mg Oral Q3H PRN Max 3/day Renae Galvez, BOBBYNP      pantoprazole  40 mg Oral Early Morning Anika Harris, BOBBYNP      polyethylene glycol  17 g Oral Daily PRN Renae Galvez, BOBBYNP      polyethylene glycol  17 g Oral Daily MERCY Kim      prazosin  2 mg Oral HS Oleksandr Massey MD      pregabalin  150 mg Oral BID Oleksandr Massey MD      propranolol  5 mg Oral Q8H PRN  MERCY Sanchez      senna-docusate sodium  1 tablet Oral Daily PRN MERCY Sanchez      sertraline  200 mg Oral HS Oleksandr Massey MD      traZODone  100 mg Oral HS Oleksandr Massey MD      Valbenazine Tosylate  60 mg Oral Daily Anika CoxMERCY Daniels         Vital signs in last 24 hours:  Temp:  [97.2 °F (36.2 °C)-97.5 °F (36.4 °C)] 97.5 °F (36.4 °C)  HR:  [66-93] 73  BP: (110-136)/(57-79) 136/66  Resp:  [16-18] 18  SpO2:  [95 %-98 %] 98 %  O2 Device: None (Room air)    Laboratory results:  I have personally reviewed all pertinent laboratory/tests results.  Most Recent Labs:   Lab Results   Component Value Date    WBC 4.63 11/19/2024    RBC 4.45 11/19/2024    HGB 12.9 11/19/2024    HCT 39.7 11/19/2024     11/19/2024    RDW 13.2 11/19/2024    TOTANEUTABS 0.83 (L) 05/25/2023    NEUTROABS 2.34 11/19/2024    SODIUM 139 11/21/2024    K 4.0 11/21/2024     11/21/2024    CO2 28 11/21/2024    BUN 14 11/21/2024    CREATININE 0.68 11/21/2024    GLUC 94 11/21/2024    GLUF 94 11/21/2024    CALCIUM 8.3 (L) 11/21/2024    AST 17 11/19/2024    ALT 17 11/19/2024    ALKPHOS 79 11/19/2024    TP 6.2 (L) 11/19/2024    ALB 3.8 11/19/2024    TBILI 0.73 11/19/2024    CHOLESTEROL 99 11/19/2024    HDL 49 (L) 11/19/2024    TRIG 40 11/19/2024    LDLCALC 42 11/19/2024    NONHDLC 50 11/19/2024    LITHIUM <0.1 (L) 03/23/2023    AMMONIA <10 (L) 06/27/2021    [id number] 1.759 11/16/2024    FREET4 0.80 02/13/2020    PREGSERUM Negative 02/22/2014    RPR Non-Reactive 06/29/2021    HGBA1C 4.8 11/18/2024    EAG 91 11/18/2024             Mental Status Exam:  Appearance:  casually dressed, marginal hygiene   Behavior:  cooperative, angry, responds to redirection   Speech:  loud   Mood:  dysphoric, anxious   Affect:  constricted   Thought Process:  perseverative, negative thinking   Associations: perseveration   Thought Content:  no overt delusions, negative thoughts   Perceptual Disturbances: denies  auditory hallucinations when asked, does not appear responding to internal stimuli   Risk Potential: Suicidal ideation - None at present  Homicidal ideation - None at present  Potential for aggression - No   Sensorium:  oriented to person, place, and time/date   Memory:  recent and remote memory grossly intact   Consciousness:  alert and awake   Attention/Concentration: decreased concentration and decreased attention span   Insight:  limited   Judgment: limited   Gait/Station: normal gait/station, normal balance   Motor Activity: abnormal movement noted: dyskinetic face movements present         aSgar Jones DO  Attending Psychiatrist   [facility]    This note was not shared with the patient due to reasonable likelihood of causing patient harm    This note was completed in part utilizing Dragon dictation Software. Grammatical, translation, syntax errors, random word insertions, spelling mistakes, and incomplete sentences may be an occasional consequence of this system secondary to software limitations with voice recognition, ambient noise, and hardware issues. If you have any questions or concerns about the content, text, or information contained within the body of this dictation, please contact the provider for clarification.

## 2024-11-23 NOTE — NURSING NOTE
Pt reported combination of morphine IR prn and ibuprofen prn for breakthrough pain effective for pain, pt reports improvement in pain to 7/10 currently, reports she is able tolerate well. Pt pleasant during interactions. Denies additional symptoms currently.

## 2024-11-23 NOTE — PLAN OF CARE
Problem: Potential for Falls  Goal: Patient will remain free of falls  Description: INTERVENTIONS:  - Educate patient/family on patient safety including physical limitations  - Instruct patient to call for assistance with activity   - Consult OT/PT to assist with strengthening/mobility   - Keep Call bell within reach  - Keep bed low and locked with side rails adjusted as appropriate  - Keep care items and personal belongings within reach  - Initiate and maintain comfort rounds  - Make Fall Risk Sign visible to staff  - Offer Toileting every q2 Hours, in advance of need  - Initiate/Maintain bed alarm  - Obtain necessary fall risk management equipment: walker  - Apply yellow socks and bracelet for high fall risk patients  - Consider moving patient to room near nurses station  Outcome: Progressing     Problem: Ineffective Coping  Goal: Participates in unit activities  Description: Interventions:  - Provide therapeutic environment   - Provide required programming   - Redirect inappropriate behaviors   Outcome: Progressing     Problem: Depression  Goal: Treatment Goal: Demonstrate behavioral control of depressive symptoms, verbalize feelings of improved mood/affect, and adopt new coping skills prior to discharge  Outcome: Progressing  Goal: Verbalize thoughts and feelings  Description: Interventions:  - Assess and re-assess patient's level of risk   - Engage patient in 1:1 interactions, daily, for a minimum of 15 minutes   - Encourage patient to express feelings, fears, frustrations, hopes   Outcome: Progressing  Goal: Refrain from harming self  Description: Interventions:  - Monitor patient closely, per order   - Supervise medication ingestion, monitor effects and side effects   Outcome: Progressing  Goal: Refrain from isolation  Description: Interventions:  - Develop a trusting relationship   - Encourage socialization   Outcome: Progressing  Goal: Refrain from self-neglect  Outcome: Progressing  Goal: Attend and  participate in unit activities, including therapeutic, recreational, and educational groups  Description: Interventions:  - Provide therapeutic and educational activities daily, encourage attendance and participation, and document same in the medical record   Outcome: Progressing  Goal: Complete daily ADLs, including personal hygiene independently, as able  Description: Interventions:  - Observe, teach, and assist patient with ADLS  -  Monitor and promote a balance of rest/activity, with adequate nutrition and elimination   Outcome: Progressing

## 2024-11-23 NOTE — NURSING NOTE
Patient was visible and social with select peers in the milieu. Endorses anxiety, denies all other psych s/s. No behaviors noted. Morphine 15 mg given at 1801 for generalized body pain was ineffective. Took her HS medications. Safety checks ongoing.

## 2024-11-24 PROCEDURE — 99232 SBSQ HOSP IP/OBS MODERATE 35: CPT | Performed by: PSYCHIATRY & NEUROLOGY

## 2024-11-24 RX ADMIN — MORPHINE SULFATE 15 MG: 15 TABLET ORAL at 08:10

## 2024-11-24 RX ADMIN — LUBIPROSTONE 8 MCG: 8 CAPSULE, GELATIN COATED ORAL at 21:17

## 2024-11-24 RX ADMIN — PANTOPRAZOLE SODIUM 40 MG: 40 TABLET, DELAYED RELEASE ORAL at 06:29

## 2024-11-24 RX ADMIN — Medication 400 MG: at 08:06

## 2024-11-24 RX ADMIN — LORATADINE 10 MG: 10 TABLET ORAL at 08:05

## 2024-11-24 RX ADMIN — BUSPIRONE HYDROCHLORIDE 20 MG: 10 TABLET ORAL at 17:15

## 2024-11-24 RX ADMIN — SERTRALINE HYDROCHLORIDE 200 MG: 100 TABLET ORAL at 21:17

## 2024-11-24 RX ADMIN — HYDROXYZINE HYDROCHLORIDE 50 MG: 50 TABLET, FILM COATED ORAL at 21:58

## 2024-11-24 RX ADMIN — IBUPROFEN 600 MG: 600 TABLET, FILM COATED ORAL at 15:13

## 2024-11-24 RX ADMIN — BUSPIRONE HYDROCHLORIDE 20 MG: 10 TABLET ORAL at 21:17

## 2024-11-24 RX ADMIN — Medication 1000 UNITS: at 08:06

## 2024-11-24 RX ADMIN — BUSPIRONE HYDROCHLORIDE 20 MG: 10 TABLET ORAL at 08:06

## 2024-11-24 RX ADMIN — ASPIRIN 81 MG: 81 TABLET, COATED ORAL at 08:06

## 2024-11-24 RX ADMIN — AMLODIPINE BESYLATE 5 MG: 5 TABLET ORAL at 08:06

## 2024-11-24 RX ADMIN — POLYETHYLENE GLYCOL 3350 17 G: 17 POWDER, FOR SOLUTION ORAL at 08:06

## 2024-11-24 RX ADMIN — LUBIPROSTONE 8 MCG: 8 CAPSULE, GELATIN COATED ORAL at 08:05

## 2024-11-24 RX ADMIN — PREGABALIN 150 MG: 100 CAPSULE ORAL at 08:05

## 2024-11-24 RX ADMIN — ARIPIPRAZOLE 10 MG: 10 TABLET ORAL at 08:06

## 2024-11-24 RX ADMIN — ALUMINUM HYDROXIDE, MAGNESIUM HYDROXIDE, AND DIMETHICONE 30 ML: 200; 20; 200 SUSPENSION ORAL at 22:00

## 2024-11-24 RX ADMIN — VALBENAZINE 60 MG: 60 CAPSULE ORAL at 08:07

## 2024-11-24 RX ADMIN — PREGABALIN 150 MG: 100 CAPSULE ORAL at 17:15

## 2024-11-24 RX ADMIN — TRAZODONE HYDROCHLORIDE 100 MG: 100 TABLET ORAL at 21:17

## 2024-11-24 RX ADMIN — ATORVASTATIN CALCIUM 40 MG: 40 TABLET, FILM COATED ORAL at 17:15

## 2024-11-24 NOTE — NURSING NOTE
Pt resting in bed and visible for meals during shift. Pt reports positive mood with no symptoms reported during shift. Pt does report feeling bloated with trend of weight gain over the past few months and notes swelling of her ankles. Trace edema in BLE noted upon assessment. Pt also reports upper abdominal fullness, tenderness upon palpation. Pt reports shortness of breath with ambulation, encouraged to continue to ambulate as tolerated. No shortness of breath observed during interaction. She reported having 1 medium soft BM today, but reports incomplete emptying may be contributing to abdominal fullness. SLIM informed of patient concerns.

## 2024-11-24 NOTE — PROGRESS NOTES
Progress Note - Behavioral Health   Name: Samantha Rodriguez 58 y.o. female I MRN: [id number]  Unit/Bed#: [facility] 606-01 I Date of Admission: 11/16/2024   Date of Service: 11/24/2024 I Hospital Day: 6     Assessment & Plan  Severe episode of recurrent major depressive disorder, without psychotic features (HCC)  Continue Zoloft 200 mg at bedtime for ongoing symptoms of depression and anxiety  Continue Abilify 10 mg daily for antidepressant augmentation (increased 11/23/2024)    Legal Status: 201  Expected Discharge: 11/27/2024  Generalized anxiety disorder with panic attacks  Continue Zoloft 200 mg at bedtime for ongoing symptoms of depression and anxiety  Continue Lyrica 150 mg twice daily for anxiety and chronic pain  Continue Buspar 20 mg three times daily for generalized anxiety  At this time will avoid benzodiazepine use in the setting of current morphine use  PTSD (post-traumatic stress disorder)  Continue prazosin 2 mg at night for nightmares  Continue ongoing medication management   Lumbar spondylosis  Pain management per medical team  Obesity, morbid (HCC)  management per medical team  Tardive dyskinesia  Continue Ingrezza 60 mg daily  History of CVA (cerebrovascular accident)  management per medical team  Medical clearance for psychiatric admission  management per medical team  Mixed hyperlipidemia  management per medical team  Opioid-induced constipation  management per medical team  Mood insomnia (HCC)  Trazodone 100 mg at bedtime for insomnia    PRN medications over the past 24 hours:    Maalox, Atarax, Motrin, Robaxin, Morphine, Miralax    Progress toward goals:    Unchanged, somatic, perseverative on pain, depression and anxiety, continues to require inpatient psychiatric hospitalization    Discharge disposition:   201, discharge TBD pending improvement in symptoms    Recommended Treatment:      - Encourage early mobility and having a structured day  - Provide frequent re-orientation, and cognitive  "stimulation  - Ensure assistive devices are in proper working order (eye-glasses, hearing aids)  - Encourage adequate hydration, nutrition and monitor bowel movements  - Maintain sleep-wake cycle: Uninterrupted sleep time; low-level lighting at night  - Fall precaution  - Encourage group therapy, milieu therapy, and occupational therapy  - Behavioral Health checks every 15 minutes  - Medical management per SLIM    Risks, benefits and possible side effects of Medications:   Risks, benefits, and possible side effects of medications explained to patient and patient verbalizes understanding.            SUBJECTIVE:  The patient was evaluated today for continuity of care and no acute distress noted throughout the evaluation.  The patient was cooperative with the evaluation, reporting \"better\" mood today citing less pain and decreased anxiety.  She reports she is tolerating increased dose of Abilify without issue.  She continues to be somatic, focused on pain, reporting constipation, leg swelling.  Advised nurse to contact medical provider.  Today the patient endorsed adequate sleep, appetite, low level of energy.  She denied suicidal or homicidal ideation.  Denies auditory or visual hallucinations.  Patient denies adverse effects from medication and is in agreement with plans to continue current medications.      Psychiatric Review of Systems:  Behavior over the last 24 hours:  unchanged  Sleep: normal  Appetite: normal  Medication side effects: No   ROS: reports back pain, constipation, leg swelling    OBJECTIVE:  Current Medications:  Current Facility-Administered Medications   Medication Dose Route Frequency Provider Last Rate    acetaminophen  650 mg Oral Q4H PRN MERCY Sanchez      acetaminophen  650 mg Oral Q4H PRN MERCY Sanchez      aluminum-magnesium hydroxide-simethicone  30 mL Oral Q4H PRN MERCY Sanchez      amLODIPine  5 mg Oral Daily MERCY Kim      ARIPiprazole  10 mg Oral " Daily Sagar Bucca, DO      aspirin  81 mg Oral Daily Anika Harris, CRNP      atorvastatin  40 mg Oral Daily With Dinner Anika Orozcodelphine, CRNP      benztropine  1 mg Intramuscular Q4H PRN Max 6/day Renae Galvez, CRNP      benztropine  0.5 mg Oral Q4H PRN Max 6/day Renae Galvez, CRNP      bisacodyl  10 mg Rectal Daily PRN Renae Galvez, BOBBYNP      busPIRone  20 mg Oral TID Oleksandr Massey MD      Cholecalciferol  1,000 Units Oral Daily Anika Orozcodelphine, CRNP      cyclobenzaprine  10 mg Oral TID PRN Anika Bean Kimberly, MERCY      Diclofenac Sodium  2 g Topical 4x Daily PRN Sandra Dixon, BOBBYNP      hydrOXYzine HCL  100 mg Oral Q6H PRN Oleksandr Massey MD      hydrOXYzine HCL  25 mg Oral Q6H PRN Max 4/day Renae Galvez, BOBBYNP      hydrOXYzine HCL  50 mg Oral Q6H PRN Max 4/day Renae Galvez, BOBBYNP      ibuprofen  600 mg Oral Q8H PRN Anika Thaogeorge, CRNP      loratadine  10 mg Oral Daily Anika Orozcodelphine, BOBBYNP      LORazepam  1 mg Intramuscular Q6H PRN Max 3/day Renae Galvez, BOBBYNP      lubiprostone  8 mcg Oral BID Anika Orozcodelphine, CRNP      magnesium Oxide  400 mg Oral Daily Anika Thaogeorge, CRNP      methocarbamol  500 mg Oral Q6H PRN Anika Orozcodelphine, CRNP      morphine  15 mg Oral Q6H PRN Renae Galvez, BOBBYNP      OLANZapine  5 mg Intramuscular Q3H PRN Max 3/day Renae Galvez, BOBBYNP      OLANZapine  2.5 mg Oral Q4H PRN Max 6/day Renae Galvez, BOBBYNP      OLANZapine  5 mg Oral Q4H PRN Max 3/day Renae Galvez, BOBBYNP      OLANZapine  5 mg Oral Q3H PRN Max 3/day Renae Galvez, BOBBYNP      pantoprazole  40 mg Oral Early Morning Anika Coxpiedad Harris, CRNP      polyethylene glycol  17 g Oral Daily PRN Renae Galvez, BOBBYNP      polyethylene glycol  17 g Oral Daily Anika MERCY Lynn      prazosin  2 mg Oral HS Oleksandr Msasey MD      pregabalin  150 mg Oral BID Oleksandr Massey MD      propranolol  5 mg Oral Q8H PRN Renae  MERCY Galvez      senna-docusate sodium  1 tablet Oral Daily PRN MERCY Sanchez      sertraline  200 mg Oral HS Oleksandr Massey MD      traZODone  100 mg Oral HS Oleksandr Massey MD      Valbenazine Tosylate  60 mg Oral Daily Anika CoxMERCY Daniels         Vital signs in last 24 hours:  Temp:  [97.5 °F (36.4 °C)-97.7 °F (36.5 °C)] 97.5 °F (36.4 °C)  HR:  [62-74] 62  BP: (117-123)/(59-69) 123/69  Resp:  [15-16] 15  SpO2:  [95 %-96 %] 95 %  O2 Device: None (Room air)    Laboratory results:  I have personally reviewed all pertinent laboratory/tests results.  Most Recent Labs:   Lab Results   Component Value Date    WBC 4.63 11/19/2024    RBC 4.45 11/19/2024    HGB 12.9 11/19/2024    HCT 39.7 11/19/2024     11/19/2024    RDW 13.2 11/19/2024    TOTANEUTABS 0.83 (L) 05/25/2023    NEUTROABS 2.34 11/19/2024    SODIUM 139 11/21/2024    K 4.0 11/21/2024     11/21/2024    CO2 28 11/21/2024    BUN 14 11/21/2024    CREATININE 0.68 11/21/2024    GLUC 94 11/21/2024    GLUF 94 11/21/2024    CALCIUM 8.3 (L) 11/21/2024    AST 17 11/19/2024    ALT 17 11/19/2024    ALKPHOS 79 11/19/2024    TP 6.2 (L) 11/19/2024    ALB 3.8 11/19/2024    TBILI 0.73 11/19/2024    CHOLESTEROL 99 11/19/2024    HDL 49 (L) 11/19/2024    TRIG 40 11/19/2024    LDLCALC 42 11/19/2024    NONHDLC 50 11/19/2024    LITHIUM <0.1 (L) 03/23/2023    AMMONIA <10 (L) 06/27/2021    [id number] 1.759 11/16/2024    FREET4 0.80 02/13/2020    PREGSERUM Negative 02/22/2014    RPR Non-Reactive 06/29/2021    HGBA1C 4.8 11/18/2024    EAG 91 11/18/2024             Mental Status Exam:  Appearance:  casually dressed, marginal hygiene, overweight   Behavior:  cooperative, responds to redirection   Speech:  slow, soft   Mood:  depressed, dysphoric   Affect:  constricted   Thought Process:  perseverative, negative thinking   Associations: perseveration   Thought Content:  no overt delusions, negative thoughts   Perceptual Disturbances: denies  auditory hallucinations when asked, does not appear responding to internal stimuli   Risk Potential: Suicidal ideation - None at present  Homicidal ideation - None at present  Potential for aggression - No   Sensorium:  oriented to person, place, and time/date   Memory:  recent and remote memory grossly intact   Consciousness:  alert and awake   Attention/Concentration: attention span and concentration appear shorter than expected for age   Insight:  limited   Judgment: limited   Gait/Station: normal gait/station, normal balance   Motor Activity: no abnormal movements         Sagar Jones DO  Attending Psychiatrist   [facility]    This note was not shared with the patient due to reasonable likelihood of causing patient harm    This note was completed in part utilizing Dragon dictation Software. Grammatical, translation, syntax errors, random word insertions, spelling mistakes, and incomplete sentences may be an occasional consequence of this system secondary to software limitations with voice recognition, ambient noise, and hardware issues. If you have any questions or concerns about the content, text, or information contained within the body of this dictation, please contact the provider for clarification.

## 2024-11-24 NOTE — NURSING NOTE
Patient was withdrawn to her lying in bed quietly. Endorses anxiety and depression, denies all other psych s/s. No behaviors noted. C/o generalized body pain 10/10, Motrin 600 mg given at 2304 was effective. C/o heartburn, Maalox given at 2141 was effective. Took her HS medications. Fall precautions in place. Safety checks ongoing.

## 2024-11-24 NOTE — NURSING NOTE
Pt reported 9/10 generalized pain, morphine IR administered with positive result, pt resting in room, appears to sleep when reassessed.

## 2024-11-24 NOTE — ASSESSMENT & PLAN NOTE
Continue Zoloft 200 mg at bedtime for ongoing symptoms of depression and anxiety  Continue Abilify 10 mg daily for antidepressant augmentation (increased 11/23/2024)    Legal Status: 201  Expected Discharge: 11/27/2024

## 2024-11-24 NOTE — PLAN OF CARE
Problem: Ineffective Coping  Goal: Participates in unit activities  Description: Interventions:  - Provide therapeutic environment   - Provide required programming   - Redirect inappropriate behaviors   Outcome: Progressing     Problem: Depression  Goal: Verbalize thoughts and feelings  Description: Interventions:  - Assess and re-assess patient's level of risk   - Engage patient in 1:1 interactions, daily, for a minimum of 15 minutes   - Encourage patient to express feelings, fears, frustrations, hopes   Outcome: Progressing     Problem: Depression  Goal: Refrain from isolation  Description: Interventions:  - Develop a trusting relationship   - Encourage socialization   Outcome: Progressing

## 2024-11-25 PROCEDURE — 99232 SBSQ HOSP IP/OBS MODERATE 35: CPT | Performed by: PSYCHIATRY & NEUROLOGY

## 2024-11-25 RX ORDER — BUSPIRONE HYDROCHLORIDE 15 MG/1
15 TABLET ORAL 3 TIMES DAILY
Status: DISCONTINUED | OUTPATIENT
Start: 2024-11-25 | End: 2024-12-02 | Stop reason: HOSPADM

## 2024-11-25 RX ADMIN — VALBENAZINE 60 MG: 60 CAPSULE ORAL at 09:15

## 2024-11-25 RX ADMIN — TRAZODONE HYDROCHLORIDE 100 MG: 100 TABLET ORAL at 21:14

## 2024-11-25 RX ADMIN — Medication 1000 UNITS: at 09:14

## 2024-11-25 RX ADMIN — MORPHINE SULFATE 15 MG: 15 TABLET ORAL at 01:11

## 2024-11-25 RX ADMIN — ATORVASTATIN CALCIUM 40 MG: 40 TABLET, FILM COATED ORAL at 16:32

## 2024-11-25 RX ADMIN — PREGABALIN 150 MG: 100 CAPSULE ORAL at 16:32

## 2024-11-25 RX ADMIN — PREGABALIN 150 MG: 100 CAPSULE ORAL at 21:12

## 2024-11-25 RX ADMIN — SERTRALINE HYDROCHLORIDE 200 MG: 100 TABLET ORAL at 21:14

## 2024-11-25 RX ADMIN — LORATADINE 10 MG: 10 TABLET ORAL at 09:14

## 2024-11-25 RX ADMIN — LUBIPROSTONE 8 MCG: 8 CAPSULE, GELATIN COATED ORAL at 09:13

## 2024-11-25 RX ADMIN — HYDROXYZINE HYDROCHLORIDE 50 MG: 50 TABLET, FILM COATED ORAL at 18:35

## 2024-11-25 RX ADMIN — PRAZOSIN HYDROCHLORIDE 2 MG: 1 CAPSULE ORAL at 21:13

## 2024-11-25 RX ADMIN — MORPHINE SULFATE 15 MG: 15 TABLET ORAL at 16:40

## 2024-11-25 RX ADMIN — PREGABALIN 150 MG: 100 CAPSULE ORAL at 09:13

## 2024-11-25 RX ADMIN — ASPIRIN 81 MG: 81 TABLET, COATED ORAL at 09:14

## 2024-11-25 RX ADMIN — PANTOPRAZOLE SODIUM 40 MG: 40 TABLET, DELAYED RELEASE ORAL at 06:10

## 2024-11-25 RX ADMIN — LUBIPROSTONE 8 MCG: 8 CAPSULE, GELATIN COATED ORAL at 21:14

## 2024-11-25 RX ADMIN — BUSPIRONE HYDROCHLORIDE 15 MG: 15 TABLET ORAL at 21:14

## 2024-11-25 RX ADMIN — ARIPIPRAZOLE 10 MG: 10 TABLET ORAL at 09:14

## 2024-11-25 RX ADMIN — IBUPROFEN 600 MG: 600 TABLET, FILM COATED ORAL at 21:14

## 2024-11-25 RX ADMIN — Medication 400 MG: at 09:14

## 2024-11-25 RX ADMIN — ALUMINUM HYDROXIDE, MAGNESIUM HYDROXIDE, AND DIMETHICONE 30 ML: 200; 20; 200 SUSPENSION ORAL at 09:20

## 2024-11-25 RX ADMIN — AMLODIPINE BESYLATE 5 MG: 5 TABLET ORAL at 09:14

## 2024-11-25 RX ADMIN — BUSPIRONE HYDROCHLORIDE 20 MG: 10 TABLET ORAL at 09:14

## 2024-11-25 RX ADMIN — IBUPROFEN 600 MG: 600 TABLET, FILM COATED ORAL at 09:30

## 2024-11-25 RX ADMIN — BUSPIRONE HYDROCHLORIDE 15 MG: 15 TABLET ORAL at 16:33

## 2024-11-25 NOTE — PROGRESS NOTES
Progress Note - Behavioral Health   Name: Samantha Rodriguez 58 y.o. female I MRN: [id number]  Unit/Bed#: [facility] 606-01 I Date of Admission: 11/16/2024   Date of Service: 11/25/2024 I Hospital Day: 7    Assessment & Plan  Severe episode of recurrent major depressive disorder, without psychotic features (HCC)  Continue Zoloft 200 mg at bedtime for ongoing symptoms of depression and anxiety  Continue Abilify 10 mg daily for antidepressant augmentation (increased 11/23/2024)    Legal Status: 201  Expected Discharge: 11/27/2024  Generalized anxiety disorder with panic attacks  Continue Zoloft 200 mg at bedtime for ongoing symptoms of depression and anxiety  Increased Lyrica to 150 mg three times daily for anxiety and chronic pain  Reduced BuSpar to 15 mg three times daily for generalized anxiety  At this time will avoid benzodiazepine use in the setting of current morphine use  PTSD (post-traumatic stress disorder)  Continue prazosin 2 mg at night for nightmares  Continue ongoing medication management   Lumbar spondylosis  Pain management per medical team  Obesity, morbid (HCC)  Management per medical team  Tardive dyskinesia  Continue Ingrezza 60 mg daily  History of CVA (cerebrovascular accident)  Management per medical team  Medical clearance for psychiatric admission  Management per medical team  Mixed hyperlipidemia  Management per medical team  Opioid-induced constipation  Management per medical team  Mood insomnia (HCC)  Trazodone 100 mg at bedtime for insomnia    Progress Toward Goals: Slow Improvement    Recommended Treatment: Continue with group therapy, milieu therapy and occupational therapy.      Risks, benefits and possible side effects of Medications:   Risks, benefits, and possible side effects of medications explained to patient and patient verbalizes understanding.      History of Present Illness   Behavior over the last 24 hours:  unchanged  Sleep: normal  Appetite: normal  Medication side effects:  "No  ROS: no complaints    Subjective:    Patient was seen today for continuity of care. Prior to patient interview, all available and pertinent information was reviewed including prior documentation, laboratory results, and imaging studies as applicable.    Today, Samantha reports that she continues to have ongoing struggles with depression and anxiety. She reports feeling \"worried.\" She states that she is currently experiencing 9/10 depression and 9/10 anxiety (with 10 being the worst symptomatology).     Today, Samantha reports that she experienced two panic attacks yesterday. She continues to remain preoccupied with panic attacks and expresses fear regarding to returning to the rehab facility, stating that she did not receive good treatment. She reports at this time plans are being made for her to live with her son and daughter in [place]. She continues to fear having a panic attack upon discharge.    Today, Samantha reports that she has been eating well. She reports that she has been sleeping adequately on her medication regimen. Samantha denies side effects to her psychiatric medications and has been taking them as directed.    Today, Samantha denies suicidal ideation, homicidal ideation, visual and auditory hallucinations.    Objective   Mental Status Evaluation:  Appearance:  age appropriate and casually dressed, good eye contact, no acute distress   Behavior:  Calm, pleasant, cooperative   Speech:  Dysarthric, normal rate, normal volume   Mood:  \"Worried\"   Affect:  Remains anxious   Thought Process:  Organized, linear, goal directed   Associations: intact associations   Thought Content:  Remains preoccupied that she will have a panic attack in the future   Perceptual Disturbances: Denies visual and auditory hallucinations, does not appear responding to internal stimuli   Risk Potential: Suicidal Ideations - none  Homicidal Ideations - none  Potential for Aggression No   Sensorium:  person, place, and time/date "   Memory:  recent and remote memory grossly intact   Consciousness:  alert and awake    Attention: attention span appeared shorter than expected for age   Insight:  limited   Judgment: fair   Gait/Station: Stable gait with assistive device   Motor Activity: Patient remains with mild repetitive and rhythmic involuntary movements of the mouth and tongue. These movements have markedly improved during hospitalization with current regimen.     Medications: all current active meds have been reviewed.      Lab Results: I have reviewed the following results:  Most Recent Labs:   Lab Results   Component Value Date    WBC 4.63 11/19/2024    RBC 4.45 11/19/2024    HGB 12.9 11/19/2024    HCT 39.7 11/19/2024     11/19/2024    RDW 13.2 11/19/2024    NEUTROABS 2.34 11/19/2024    SODIUM 139 11/21/2024    K 4.0 11/21/2024     11/21/2024    CO2 28 11/21/2024    BUN 14 11/21/2024    CREATININE 0.68 11/21/2024    GLUC 94 11/21/2024    GLUF 94 11/21/2024    CALCIUM 8.3 (L) 11/21/2024    AST 17 11/19/2024    ALT 17 11/19/2024    ALKPHOS 79 11/19/2024    TP 6.2 (L) 11/19/2024    ALB 3.8 11/19/2024    TBILI 0.73 11/19/2024    CHOLESTEROL 99 11/19/2024    HDL 49 (L) 11/19/2024    TRIG 40 11/19/2024    LDLCALC 42 11/19/2024    NONHDLC 50 11/19/2024    LITHIUM <0.1 (L) 03/23/2023    AMMONIA <10 (L) 06/27/2021    [id number] 1.759 11/16/2024    FREET4 0.80 02/13/2020    PREGSERUM Negative 02/22/2014    RPR Non-Reactive 06/29/2021    HGBA1C 4.8 11/18/2024    EAG 91 11/18/2024       Oleksandr Massey MD

## 2024-11-25 NOTE — PLAN OF CARE
Problem: Potential for Falls  Goal: Patient will remain free of falls  Description: INTERVENTIONS:  - Educate patient/family on patient safety including physical limitations  - Instruct patient to call for assistance with activity   - Consult OT/PT to assist with strengthening/mobility   - Keep Call bell within reach  - Keep bed low and locked with side rails adjusted as appropriate  - Keep care items and personal belongings within reach  - Initiate and maintain comfort rounds  - Make Fall Risk Sign visible to staff  - Offer Toileting every q2 Hours, in advance of need  - Initiate/Maintain bed alarm  - Obtain necessary fall risk management equipment: walker  - Apply yellow socks and bracelet for high fall risk patients  - Consider moving patient to room near nurses station  Outcome: Progressing     Problem: Ineffective Coping  Goal: Demonstrates healthy coping skills  Outcome: Progressing     Problem: Ineffective Coping  Goal: Participates in unit activities  Description: Interventions:  - Provide therapeutic environment   - Provide required programming   - Redirect inappropriate behaviors   Outcome: Progressing

## 2024-11-25 NOTE — NURSING NOTE
Pt tearful, reported anxiety while resting in room. Pt administered atarax prn for moderate anxiety, will monitor.

## 2024-11-25 NOTE — NURSING NOTE
"Pt appears calm and cooperative during shift. Pleasant during interactions. Pt reported generalized pain, ibuprofen administered with positive result. Pt appears to sleep soundly in room. Pt encouraged to attend groups during shift. Pt declined miralax, reported loose BM today. Pt reports mood is \"not as good\" today, reporting feeling depressed. Reports some anxiety related to upcoming surgery. Pt denies SI or additional symptoms at current time.   "

## 2024-11-25 NOTE — ASSESSMENT & PLAN NOTE
Continue Zoloft 200 mg at bedtime for ongoing symptoms of depression and anxiety  Increased Lyrica to 150 mg three times daily for anxiety and chronic pain  Reduced BuSpar to 15 mg three times daily for generalized anxiety  At this time will avoid benzodiazepine use in the setting of current morphine use

## 2024-11-25 NOTE — TREATMENT TEAM
11/25/24 0830   Team Meeting   Meeting Type Daily Rounds   Team Members Present   Team Members Present Physician;Nurse;;Other (Discipline and Name)   Physician Team Member Dr. Jones   Nursing Team Member DENISA Smith   Care Management Team Member JUAN Sesay   Other (Discipline and Name) CHELSIE Rod     Patient withdrawn OOB for meals endorses anxiety, no participation in groups, increased Abilify over the weekend, received Atarax 50mg 0158 which was effective. Slept without issue last night.

## 2024-11-25 NOTE — NURSING NOTE
Pt administered morphine IR for 9/10 generalized pain, pt reported effective in reducing pain to 8/10. Medication partially effective.

## 2024-11-25 NOTE — PROGRESS NOTES
11/25/24 0800 11/25/24 1000 11/25/24 1100   Activity/Group Checklist   Group Exercise Community meeting Life Skills  (task on leisure values.)   Attendance Did not attend Attended Attended   Attendance Duration (min)  --  0-15 46-60   Interactions  --  Other (Comment)  (Pt. joined group in progress,after telling self that she needs to be more motivated.) Interacted appropriately  (Pt. spoke about her fear of being less functional even after pending back surgery.)   Affect/Mood  --  Calm Appropriate   Goals Achieved  --  Able to engage in interactions;Able to listen to others;Able to reflect/comment on own behavior Able to listen to others;Able to engage in interactions;Able to reflect/comment on own behavior;Identified feelings;Identified triggers      11/25/24 1330   Activity/Group Checklist   Group Wellness  (guided relaxation session.)   Attendance Did not attend   Attendance Duration (min)  --    Interactions  --    Affect/Mood  --    Goals Achieved  --

## 2024-11-25 NOTE — NURSING NOTE
Patient was withdrawn to her room. Endorses anxiety, denies all other psych s/s. No behaviors noted. C/o bloating, Maalox given at 2200 was effective. Took her HS medications. Safety checks ongoing.

## 2024-11-26 ENCOUNTER — TELEPHONE (OUTPATIENT)
Age: 59
End: 2024-11-26

## 2024-11-26 PROCEDURE — 99232 SBSQ HOSP IP/OBS MODERATE 35: CPT | Performed by: PSYCHIATRY & NEUROLOGY

## 2024-11-26 RX ORDER — TRAZODONE HYDROCHLORIDE 100 MG/1
200 TABLET ORAL
Status: DISCONTINUED | OUTPATIENT
Start: 2024-11-26 | End: 2024-11-29

## 2024-11-26 RX ADMIN — BUSPIRONE HYDROCHLORIDE 15 MG: 15 TABLET ORAL at 21:31

## 2024-11-26 RX ADMIN — PREGABALIN 150 MG: 100 CAPSULE ORAL at 08:35

## 2024-11-26 RX ADMIN — Medication 400 MG: at 08:35

## 2024-11-26 RX ADMIN — HYDROXYZINE HYDROCHLORIDE 100 MG: 50 TABLET, FILM COATED ORAL at 22:44

## 2024-11-26 RX ADMIN — ATORVASTATIN CALCIUM 40 MG: 40 TABLET, FILM COATED ORAL at 17:20

## 2024-11-26 RX ADMIN — VALBENAZINE 60 MG: 60 CAPSULE ORAL at 08:35

## 2024-11-26 RX ADMIN — POLYETHYLENE GLYCOL 3350 17 G: 17 POWDER, FOR SOLUTION ORAL at 08:35

## 2024-11-26 RX ADMIN — MORPHINE SULFATE 15 MG: 15 TABLET ORAL at 04:03

## 2024-11-26 RX ADMIN — LUBIPROSTONE 8 MCG: 8 CAPSULE, GELATIN COATED ORAL at 08:35

## 2024-11-26 RX ADMIN — MORPHINE SULFATE 15 MG: 15 TABLET ORAL at 11:52

## 2024-11-26 RX ADMIN — PANTOPRAZOLE SODIUM 40 MG: 40 TABLET, DELAYED RELEASE ORAL at 05:39

## 2024-11-26 RX ADMIN — TRAZODONE HYDROCHLORIDE 200 MG: 100 TABLET ORAL at 21:31

## 2024-11-26 RX ADMIN — ALUMINUM HYDROXIDE, MAGNESIUM HYDROXIDE, AND DIMETHICONE 30 ML: 200; 20; 200 SUSPENSION ORAL at 11:52

## 2024-11-26 RX ADMIN — ASPIRIN 81 MG: 81 TABLET, COATED ORAL at 08:35

## 2024-11-26 RX ADMIN — ARIPIPRAZOLE 10 MG: 10 TABLET ORAL at 08:35

## 2024-11-26 RX ADMIN — SERTRALINE HYDROCHLORIDE 200 MG: 100 TABLET ORAL at 21:31

## 2024-11-26 RX ADMIN — AMLODIPINE BESYLATE 5 MG: 5 TABLET ORAL at 08:34

## 2024-11-26 RX ADMIN — BUSPIRONE HYDROCHLORIDE 15 MG: 15 TABLET ORAL at 08:34

## 2024-11-26 RX ADMIN — BUSPIRONE HYDROCHLORIDE 15 MG: 15 TABLET ORAL at 17:20

## 2024-11-26 RX ADMIN — LORATADINE 10 MG: 10 TABLET ORAL at 08:34

## 2024-11-26 RX ADMIN — IBUPROFEN 600 MG: 600 TABLET, FILM COATED ORAL at 21:30

## 2024-11-26 RX ADMIN — IBUPROFEN 600 MG: 600 TABLET, FILM COATED ORAL at 09:24

## 2024-11-26 RX ADMIN — PREGABALIN 150 MG: 100 CAPSULE ORAL at 17:20

## 2024-11-26 RX ADMIN — Medication 1000 UNITS: at 08:35

## 2024-11-26 RX ADMIN — LUBIPROSTONE 8 MCG: 8 CAPSULE, GELATIN COATED ORAL at 21:31

## 2024-11-26 RX ADMIN — PREGABALIN 150 MG: 100 CAPSULE ORAL at 21:31

## 2024-11-26 NOTE — TELEPHONE ENCOUNTER
Returned patient's call to the nurses station number listed below, there was no answer. Left VM asking to have patient call me back. Gave my direct # [phone] and advised of my office hours.

## 2024-11-26 NOTE — TREATMENT TEAM
11/25/24 2114   Pain Assessment   Pain Score 6   Pain Location/Orientation Location: Generalized

## 2024-11-26 NOTE — TREATMENT TEAM
11/26/24 1152   Pain Assessment   Pain Assessment Tool 0-10   Pain Score 10 - Worst Possible Pain   Pain Location/Orientation Location: Generalized   Hospital Pain Intervention(s) Medication (See MAR)     PRN Morphine given for generalized pain

## 2024-11-26 NOTE — NURSING NOTE
Patient was alert, pleasant in the milieu. Patient was socializing with select peers. Patient endorsed anxiety and depression and denied SI. No unmet needs at present time. Medication compliant. Safety measures maintained.

## 2024-11-26 NOTE — TREATMENT TEAM
11/25/24 2117   Pain Assessment   Pain Score 6   Pain Location/Orientation Location: Generalized     Medicated with 600 mg motrin

## 2024-11-26 NOTE — CASE MANAGEMENT
Call made to pt's dtr Tunde tel# [phone]; lvm requesting c/b.     Call made to pt's son Mick, tel# [phone]; m requesting c/b.

## 2024-11-26 NOTE — TREATMENT TEAM
11/26/24 2920   Pain Assessment   Pain Assessment Tool 0-10   Pain Score 10 - Worst Possible Pain   Pain Location/Orientation Location: Back;Location: Generalized   Pain Onset/Description Onset: Ongoing   Effect of Pain on Daily Activities unable to sleep   Patient's Stated Pain Goal No pain   Hospital Pain Intervention(s) Medication (See MAR)   Multiple Pain Sites Yes   Pain Rating: FLACC (Rest) - Face 0   Pain Rating: FLACC (Rest) - Legs 0   Pain Rating: FLACC (Rest) - Activity 0   Pain Rating: FLACC (Rest) - Cry 0   Pain Rating: FLACC (Rest) - Consolability 0   Score: FLACC (Rest) 0   POSS Assessment   [place] Opioid-Induced Sedation Scale (POSS) 1     Medicated with Morphine 15 IR

## 2024-11-26 NOTE — TELEPHONE ENCOUNTER
Caller: Patient     Doctor: Hilda     Reason for call: Patient would like to discuss if she is all set with her clearance, Patient does not have access to a phone at this time, patient will call back

## 2024-11-26 NOTE — PROGRESS NOTES
11/26/24 0800 11/26/24 1000 11/26/24 1100   Activity/Group Checklist   Group Exercise Community meeting Other (Comment)  ([facility] Recovery: Self improvement goal and affirmation)   Attendance Did not attend Did not attend Did not attend      11/26/24 1330   Activity/Group Checklist   Group Life Skills   Attendance Did not attend

## 2024-11-26 NOTE — PROGRESS NOTES
Progress Note - Behavioral Health   Name: Saamntha Rodriguez 58 y.o. female I MRN: [id number]  Unit/Bed#: [facility] 606-01 I Date of Admission: 11/16/2024   Date of Service: 11/26/2024 I Hospital Day: 8    Assessment & Plan  Severe episode of recurrent major depressive disorder, without psychotic features (HCC)  Continue Zoloft 200 mg at bedtime for ongoing symptoms of depression and anxiety  Continue Abilify 10 mg daily for antidepressant augmentation (increased 11/23/2024)    Generalized anxiety disorder with panic attacks  Continue Zoloft 200 mg at bedtime for ongoing symptoms of depression and anxiety  Increased Lyrica to 150 mg three times daily for anxiety and chronic pain  Reduced BuSpar to 15 mg three times daily for generalized anxiety  At this time will avoid benzodiazepine use in the setting of current morphine use  PTSD (post-traumatic stress disorder)  Continue prazosin 2 mg at night for nightmares  Continue ongoing medication management   Lumbar spondylosis  Pain management per medical team  Obesity, morbid (HCC)  Management per medical team  Tardive dyskinesia  Continue Ingrezza 60 mg daily  History of CVA (cerebrovascular accident)  Management per medical team  Medical clearance for psychiatric admission  Management per medical team  Mixed hyperlipidemia  Management per medical team  Opioid-induced constipation  Management per medical team  Mood insomnia (HCC)  Increased Trazodone to 200 mg at bedtime for insomnia    Progress Toward Goals: Slow improvement    Recommended Treatment: Continue with group therapy, milieu therapy and occupational therapy.      Risks, benefits and possible side effects of Medications:   Risks, benefits, and possible side effects of medications explained to patient and patient verbalizes understanding.      History of Present Illness   Behavior over the last 24 hours:  unchanged  Sleep: insomnia  Appetite: normal  Medication side effects: No  ROS: Patient continues to report  "chronic severe pain largely unchanged in severity    Subjective:    Patient was seen today for continuity of care. Prior to patient interview, all available and pertinent information was reviewed including prior documentation, laboratory results, and imaging studies as applicable.    Today, Samantha continues to feel \"anxious.\"  She reports that she has reflected on the situation, and at this time she is planning to return to the rehab facility upon hospital discharge ([facility]).  She reports feelings of apprehension surrounding this decision, stating that she continues to intermittently struggle with panic attacks (she reports experiencing 2 panic attacks yesterday) and in this context is worried how she will be able to cope when she returns to the rehab center.  She continues to employ coping skills on a regular basis, including deep breathing.    Today, Samantha remains with chronic severe pain largely unchanged in severity.  She continues to catastrophize that this pain will remain present following her surgery (which is scheduled for 12/19/2024).  The patient was encouraged to continue challenging these automatic negative thoughts.  Samantha agreed, and stated that she has been doing her best to regularly attend group, napping at intervals throughout the day.    Samantha continues to report ongoing struggles sleeping at night in the context of both anxiety and chronic pain.  She was agreeable to an increase in trazodone to better alleviate these insomnia symptoms.    Samantha has been eating well.  She reports that she has been taking her psychiatric medications as prescribed and denies medication side effects.    Today, Samantha does report fleeting suicidal ideation during panic attacks, stating at times she sees \"images of me stabbing myself.\"  She denies homicidal ideation, visual or auditory hallucinations.    Objective   Mental Status Evaluation:  Appearance:  Age appropriate and casually dressed, good eye " "contact, no acute distress   Behavior:  Calm, pleasant, cooperative   Speech:  Dysarthric, normal rate, normal volume   Mood:  \"Anxious\"   Affect:  Remains anxious in affect   Thought Process:  Organized, linear, goal directed   Associations: Intact associations   Thought Content:  Continues to remain preoccupied that she will have panic attacks in the future, continues to remain with negative filtering and catastrophizing   Perceptual Disturbances: Denies visual and auditory hallucinations, does not appear responding to internal stimuli   Risk Potential: Suicidal Ideations-reports fleeting suicidal ideation during panic attacks, stating at times she sees \"images of me stabbing myself\"  Homicidal Ideations-denies  Potential for Aggression No   Sensorium:  person, place, and time/date   Memory:  recent and remote memory grossly intact   Consciousness:  alert and awake    Attention: attention span appeared shorter than expected for age   Insight:  limited   Judgment: fair   Gait/Station: Stable gait with assistive device   Motor Activity: Patient remains with mild repetitive and rhythmic involuntary movements of the mouth and tongue.  These movements have markedly improved during hospitalization with current medication regimen     Medications: all current active meds have been reviewed.      Lab Results: I have reviewed the following results:  Most Recent Labs:   Lab Results   Component Value Date    WBC 4.63 11/19/2024    RBC 4.45 11/19/2024    HGB 12.9 11/19/2024    HCT 39.7 11/19/2024     11/19/2024    RDW 13.2 11/19/2024    NEUTROABS 2.34 11/19/2024    SODIUM 139 11/21/2024    K 4.0 11/21/2024     11/21/2024    CO2 28 11/21/2024    BUN 14 11/21/2024    CREATININE 0.68 11/21/2024    GLUC 94 11/21/2024    GLUF 94 11/21/2024    CALCIUM 8.3 (L) 11/21/2024    AST 17 11/19/2024    ALT 17 11/19/2024    ALKPHOS 79 11/19/2024    TP 6.2 (L) 11/19/2024    ALB 3.8 11/19/2024    TBILI 0.73 11/19/2024    " CHOLESTEROL 99 11/19/2024    HDL 49 (L) 11/19/2024    TRIG 40 11/19/2024    LDLCALC 42 11/19/2024    NONHDLC 50 11/19/2024    LITHIUM <0.1 (L) 03/23/2023    AMMONIA <10 (L) 06/27/2021    VGB1AKSAGEFE 1.759 11/16/2024    FREET4 0.80 02/13/2020    PREGSERUM Negative 02/22/2014    RPR Non-Reactive 06/29/2021    HGBA1C 4.8 11/18/2024    EAG 91 11/18/2024       Oleksandr Massey MD

## 2024-11-26 NOTE — NURSING NOTE
Patient is withdrawn to room, depressed affect, calm, medication and meals compliant. Patient reports anxiety and depression. No behavioral issues, safety checks ongoing.

## 2024-11-26 NOTE — TELEPHONE ENCOUNTER
Caller: Patient    Doctor: Hilda    Reason for call: Patient called would like to discuss clearance for surgery. Patient currently in the hospital with no access to phone at this time. Patient gave nurses station number. Please advise.    Call back#: [phone] Nurses Station

## 2024-11-26 NOTE — TREATMENT TEAM
11/26/24 0912   Team Meeting   Meeting Type Daily Rounds   Initial Conference Date 11/26/24   Team Members Present   Team Members Present Physician;Nurse;;Occupational Therapist   Physician Team Member Dr Palafox, Dr Jones,Dr Massey, Renae MADRID   Nursing Team Member Julienne   Care Management Team Member Lory   OT Team Member Tyler   Patient/Family Present   Patient Present No   Patient's Family Present No     Reports anxiety and depression, med compliant, showered 11/23, remains on continuous morphine, PRN Atarax effective, attended 2/4 groups, reports fear of losing more function after surgery.

## 2024-11-26 NOTE — PLAN OF CARE
Problem: Potential for Falls  Goal: Patient will remain free of falls  Description: INTERVENTIONS:  - Educate patient/family on patient safety including physical limitations  - Instruct patient to call for assistance with activity   - Consult OT/PT to assist with strengthening/mobility   - Keep Call bell within reach  - Keep bed low and locked with side rails adjusted as appropriate  - Keep care items and personal belongings within reach  - Initiate and maintain comfort rounds  - Make Fall Risk Sign visible to staff  - Offer Toileting every q2 Hours, in advance of need  - Initiate/Maintain bed alarm  - Obtain necessary fall risk management equipment: walker  - Apply yellow socks and bracelet for high fall risk patients  - Consider moving patient to room near nurses station  Outcome: Progressing     Problem: Depression  Goal: Treatment Goal: Demonstrate behavioral control of depressive symptoms, verbalize feelings of improved mood/affect, and adopt new coping skills prior to discharge  Outcome: Progressing  Goal: Verbalize thoughts and feelings  Description: Interventions:  - Assess and re-assess patient's level of risk   - Engage patient in 1:1 interactions, daily, for a minimum of 15 minutes   - Encourage patient to express feelings, fears, frustrations, hopes   Outcome: Progressing  Goal: Refrain from harming self  Description: Interventions:  - Monitor patient closely, per order   - Supervise medication ingestion, monitor effects and side effects   Outcome: Progressing  Goal: Refrain from isolation  Description: Interventions:  - Develop a trusting relationship   - Encourage socialization   Outcome: Progressing  Goal: Refrain from self-neglect  Outcome: Progressing  Goal: Attend and participate in unit activities, including therapeutic, recreational, and educational groups  Description: Interventions:  - Provide therapeutic and educational activities daily, encourage attendance and participation, and document  same in the medical record   Outcome: Progressing  Goal: Complete daily ADLs, including personal hygiene independently, as able  Description: Interventions:  - Observe, teach, and assist patient with ADLS  -  Monitor and promote a balance of rest/activity, with adequate nutrition and elimination   Outcome: Progressing     Problem: Anxiety  Goal: Anxiety is at manageable level  Description: Interventions:  - Assess and monitor patient's anxiety level.   - Monitor for signs and symptoms (heart palpitations, chest pain, shortness of breath, headaches, nausea, feeling jumpy, restlessness, irritable, apprehensive).   - Collaborate with interdisciplinary team and initiate plan and interventions as ordered.  - [place] patient to unit/surroundings  - Explain treatment plan  - Encourage participation in care  - Encourage verbalization of concerns/fears  - Identify coping mechanisms  - Assist in developing anxiety-reducing skills  - Administer/offer alternative therapies  - Limit or eliminate stimulants  Outcome: Progressing

## 2024-11-26 NOTE — TREATMENT TEAM
11/26/24 0503   Pain Assessment Post Intervention   Pain Assessment Tool Used: 0-10   Post Intervention Pain Score 2   Post Intervention Pain Location/Orientation Location: Generalized   Response to Interventions effective     Effective

## 2024-11-26 NOTE — ASSESSMENT & PLAN NOTE
Continue Zoloft 200 mg at bedtime for ongoing symptoms of depression and anxiety  Continue Abilify 10 mg daily for antidepressant augmentation (increased 11/23/2024)

## 2024-11-27 PROCEDURE — 97167 OT EVAL HIGH COMPLEX 60 MIN: CPT

## 2024-11-27 PROCEDURE — 97116 GAIT TRAINING THERAPY: CPT

## 2024-11-27 PROCEDURE — 99232 SBSQ HOSP IP/OBS MODERATE 35: CPT | Performed by: PSYCHIATRY & NEUROLOGY

## 2024-11-27 RX ORDER — PROPRANOLOL HYDROCHLORIDE 10 MG/1
10 TABLET ORAL EVERY 8 HOURS PRN
Status: DISCONTINUED | OUTPATIENT
Start: 2024-11-27 | End: 2024-11-27

## 2024-11-27 RX ORDER — PROPRANOLOL HYDROCHLORIDE 10 MG/1
5 TABLET ORAL EVERY 8 HOURS PRN
Status: DISCONTINUED | OUTPATIENT
Start: 2024-11-27 | End: 2024-12-02 | Stop reason: HOSPADM

## 2024-11-27 RX ADMIN — VALBENAZINE 60 MG: 60 CAPSULE ORAL at 08:21

## 2024-11-27 RX ADMIN — PREGABALIN 150 MG: 100 CAPSULE ORAL at 21:26

## 2024-11-27 RX ADMIN — BUSPIRONE HYDROCHLORIDE 15 MG: 15 TABLET ORAL at 08:19

## 2024-11-27 RX ADMIN — ASPIRIN 81 MG: 81 TABLET, COATED ORAL at 08:19

## 2024-11-27 RX ADMIN — PREGABALIN 150 MG: 100 CAPSULE ORAL at 08:19

## 2024-11-27 RX ADMIN — BUSPIRONE HYDROCHLORIDE 15 MG: 15 TABLET ORAL at 15:56

## 2024-11-27 RX ADMIN — MORPHINE SULFATE 15 MG: 15 TABLET ORAL at 09:53

## 2024-11-27 RX ADMIN — PROPRANOLOL HYDROCHLORIDE 5 MG: 10 TABLET ORAL at 22:30

## 2024-11-27 RX ADMIN — LUBIPROSTONE 8 MCG: 8 CAPSULE, GELATIN COATED ORAL at 21:26

## 2024-11-27 RX ADMIN — PREGABALIN 150 MG: 100 CAPSULE ORAL at 15:56

## 2024-11-27 RX ADMIN — POLYETHYLENE GLYCOL 3350 17 G: 17 POWDER, FOR SOLUTION ORAL at 08:19

## 2024-11-27 RX ADMIN — LUBIPROSTONE 8 MCG: 8 CAPSULE, GELATIN COATED ORAL at 08:19

## 2024-11-27 RX ADMIN — AMLODIPINE BESYLATE 5 MG: 5 TABLET ORAL at 08:19

## 2024-11-27 RX ADMIN — TRAZODONE HYDROCHLORIDE 200 MG: 100 TABLET ORAL at 21:26

## 2024-11-27 RX ADMIN — LORATADINE 10 MG: 10 TABLET ORAL at 08:19

## 2024-11-27 RX ADMIN — HYDROXYZINE HYDROCHLORIDE 25 MG: 25 TABLET ORAL at 13:06

## 2024-11-27 RX ADMIN — Medication 1000 UNITS: at 08:19

## 2024-11-27 RX ADMIN — ATORVASTATIN CALCIUM 40 MG: 40 TABLET, FILM COATED ORAL at 15:56

## 2024-11-27 RX ADMIN — SERTRALINE HYDROCHLORIDE 200 MG: 100 TABLET ORAL at 21:26

## 2024-11-27 RX ADMIN — Medication 400 MG: at 08:19

## 2024-11-27 RX ADMIN — PANTOPRAZOLE SODIUM 40 MG: 40 TABLET, DELAYED RELEASE ORAL at 06:51

## 2024-11-27 RX ADMIN — PRAZOSIN HYDROCHLORIDE 2 MG: 1 CAPSULE ORAL at 21:26

## 2024-11-27 RX ADMIN — BUSPIRONE HYDROCHLORIDE 15 MG: 15 TABLET ORAL at 21:26

## 2024-11-27 RX ADMIN — HYDROXYZINE HYDROCHLORIDE 100 MG: 50 TABLET, FILM COATED ORAL at 22:32

## 2024-11-27 RX ADMIN — ARIPIPRAZOLE 10 MG: 10 TABLET ORAL at 08:20

## 2024-11-27 RX ADMIN — PROPRANOLOL HYDROCHLORIDE 10 MG: 10 TABLET ORAL at 14:13

## 2024-11-27 RX ADMIN — METHOCARBAMOL TABLETS 500 MG: 500 TABLET, COATED ORAL at 13:06

## 2024-11-27 NOTE — CASE MANAGEMENT
CM rec'd facility and accepting provider info from [facility]; info provided to JULIO DOLL for snf auth.

## 2024-11-27 NOTE — PLAN OF CARE
Pt did not attend any groups when prompted or offered.   Problem: Depression  Goal: Attend and participate in unit activities, including therapeutic, recreational, and educational groups  Description: Interventions:  - Provide therapeutic and educational activities daily, encourage attendance and participation, and document same in the medical record   Outcome: Not Progressing

## 2024-11-27 NOTE — OCCUPATIONAL THERAPY NOTE
Occupational Therapy Evaluation     Patient Name: Samantha Rodriguez  Today's Date: 11/27/2024  Problem List  Principal Problem:    Severe episode of recurrent major depressive disorder, without psychotic features (HCC)  Active Problems:    Lumbar spondylosis    Generalized anxiety disorder with panic attacks    Mood insomnia (HCC)    PTSD (post-traumatic stress disorder)    Obesity, morbid (HCC)    Tardive dyskinesia    History of CVA (cerebrovascular accident)    Medical clearance for psychiatric admission    Mixed hyperlipidemia    Opioid-induced constipation    Past Medical History  Past Medical History:   Diagnosis Date    Anxiety     Arthritis     left knee    Arthritis of right knee 10/6/2020    At risk for falls     Bipolar 2 disorder (HCC)     FOLLOWS WITH PSYCHIATRIST. CONTINUE LAMOTRIGINE; RESOLVED: [id number]    Depression     Familial tremor     both hands    Fibromyalgia     LAST ASSESSED: 08DEC2017    GERD (gastroesophageal reflux disease)     Hearing aid worn     left ear    [place] (hard of hearing)     left ear    Hyperlipidemia     Hypertension     Left-sided weakness     Lumbar disc disease with radiculopathy 2/2/2018    Memory loss of unknown cause     long and short term    Migraine     Multiple closed fractures of metatarsal bone of right foot 5/2/2021    Obesity     Obesity, Class II, BMI 35-39.9     Osteoarthritis of both hips 10/31/2016    Osteoarthritis of knee 2/20/2013    Description: Continue Tylenol and Naproxen. Encourage exercise and weight loss. Patient refused physical therapy. I will refer the patient back to Orthopedics.    Overactive bladder     Panic attack     Patellofemoral disorder of both knees 5/1/2020    Post traumatic stress disorder     Primary localized osteoarthritis of both knees 6/16/2017    Primary osteoarthritis of both knees 5/1/2020    S/P insertion of spinal cord stimulator     no remote    S/P total knee arthroplasty, right 3/10/2022    Sacroiliitis (HCC)  2017    Seasonal allergies     Seizure-like activity (HCC) 6/3/2022    Seizures (HCC)     possible seizure like activity    Small bowel obstruction (HCC) 3/24/2023    Status post total knee replacement, left 2022    Stroke (HCC)     questionable stroke     Tardive dyskinesia     PATIENT STATES    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  CONT ASA 81 MG DAILY; RESOLVED: [id number]    Urinary incontinence     Uses walker     Wears dentures     partial lower / full upper- doesnt wear    Wears glasses      Past Surgical History  Past Surgical History:   Procedure Laterality Date    BACK SURGERY       SECTION      COLONOSCOPY      RESOLVED: 2016    EAR SURGERY      EGD      HYSTERECTOMY      MYRINGOTOMY W/ TUBES Left     NECK SURGERY  2019    OR ARTHRP KNE CONDYLE&PLATU MEDIAL&LAT COMPARTMENTS Right 3/9/2022    Procedure: ARTHROPLASTY KNEE TOTAL;  Surgeon: Chandni Tuttle DO;  Location: [facility];  Service: Orthopedics    OR ARTHRP KNE CONDYLE&PLATU MEDIAL&LAT COMPARTMENTS Left 2022    Procedure: ARTHROPLASTY KNEE TOTAL;  Surgeon: Channdi Tuttle DO;  Location: [facility];  Service: Orthopedics    OR CYSTOURETHROSCOPY N/A 2016    Procedure: CYSTOSCOPY, BOTOX INJECTION;  Surgeon: Abraham Teague MD;  Location: [facility];  Service: Gynecology    OR INSJ/RPLCMT SPINAL NPG/RCVR POCKET CRTJ&CONNJ Right 2/10/2021    Procedure: REPLACEMENT IMPLANTABLE PULSE GENERATOR DORSAL SPINAL COLUMN STIMULATOR, RIGHT;  Surgeon: Carlos Alberto Jacome MD;  Location: [facility];  Service: Neurosurgery    OR PRQ IMPLTJ NSTIM ELECTRODE ARRAY EPIDURAL Right 2020    Procedure: INSERTION THORACIC DORSAL COLUMN SPINAL CORD STIMULATOR PERCUTANEOUS W IMPLANTABLE PULSE GENERATOR, RIGHT;  Surgeon: Carlos Alberto Jacome MD;  Location: [facility];  Service: Neurosurgery    TONSILLECTOMY      TUBAL LIGATION      UPPER GASTROINTESTINAL ENDOSCOPY  24 1150    OT Last Visit   OT Visit Date 11/27/24   Note Type   Note type Evaluation   Pain Assessment   Pain Assessment Tool 0-10   Pain Score 5   Pain Location/Orientation Location: Back;Location: Generalized   Pain Onset/Description Onset: Ongoing   Restrictions/Precautions   Weight Bearing Precautions Per Order No   Other Precautions Pain;Fall Risk   Home Living   Type of Home SNF   Prior Function   Level of [place] Independent with IADLS;Needs assistance with ADLs   ADL   Grooming Assistance 5  Supervision/Setup   UB Bathing Assistance 4  Minimal Assistance   LB Bathing Assistance 4  Minimal Assistance   UB Dressing Assistance 5  Supervision/Setup   LB Dressing Assistance 4  Minimal Assistance   Toileting Assistance  4  Minimal Assistance   Transfers   Sit to Stand 5  Supervision   Additional items Increased time required   Stand to Sit 5  Supervision   Additional items Increased time required   Functional Mobility   Functional Mobility 5  Supervision   Additional items Rolling walker   Balance   Static Sitting Fair +   Dynamic Sitting Fair   Static Standing Fair -   Dynamic Standing Fair -   Ambulatory Fair -   Activity Tolerance   Activity Tolerance Patient tolerated treatment well   RUE Assessment   RUE Assessment WFL   LUE Assessment   LUE Assessment WFL   Hand Function   Gross Motor Coordination Functional   Cognition   Arousal/Participation Alert;Cooperative   Attention Within functional limits   Orientation Level Oriented X4   Assessment   Limitation Decreased high-level ADLs;Decreased Safe judgement during ADL;Decreased cognition;Decreased endurance;Decreased ADL status   Prognosis Good   Assessment   Pt is a 58 y.o. female seen for OT evaluation s/p admit to [facility] on 11/16/2024 w/ Severe episode of recurrent major depressive disorder, without psychotic features (HCC).  See medical history above for extensive list of comorbidities affecting pt's functional performance at time of assessment. Personal factors  affecting Pt at time of IE include:difficulty performing ADLS, difficulty performing IADLS , and health management . Upon evaluation: Pt requires supervision for functional ambulation including bathroom mobility and negotiation of small spaces, supervision for ADL transfers.  Pt requires Lisandro for ADLs in standing. Pt's primary barrier(s) at this time: decreased ROM, educated re: figure 4 sit. Pt with decreased BLE sensation, increased pain, thus with difficulty managing LB ADLs. The following deficits impact occupational performance: weakness, decreased strength, decreased balance, decreased tolerance, and decreased safety awareness. Pt to benefit from continued skilled OT services while in the hospital to address deficits as defined above and maximize level of functional independence w ADL's and functional mobility. Occupational performance areas to address include: bathing/shower, toilet hygiene, dressing, health maintenance, functional mobility, and clothing management. From OT standpoint, recommendation at time of d/c would be moderate resource intensity.       Goals   STG Time Frame   (2 weeks)   Short Term Goals Pt will complete LB ADLs Alexa.   Pt will complete functional ambulation Alexa.   Pt will complete toilet hygiene Alexa.    Plan   Treatment Interventions ADL retraining;Functional transfer training;UE strengthening/ROM;Endurance training;Continued evaluation;Energy conservation;Activityengagement   Goal Expiration Date 12/11/24   OT Frequency 2-3x/wk   Discharge Recommendation   Rehab Resource Intensity Level, OT II (Moderate Resource Intensity)   AM-PAC Daily Activity Inpatient   Lower Body Dressing 2   Bathing 2   Toileting 2   Upper Body Dressing 3   Grooming 3   Eating 4   Daily Activity Raw Score 16   Daily Activity Standardized Score (Calc for Raw Score >=11) 35.96

## 2024-11-27 NOTE — PLAN OF CARE
Problem: Depression  Goal: Treatment Goal: Demonstrate behavioral control of depressive symptoms, verbalize feelings of improved mood/affect, and adopt new coping skills prior to discharge  Outcome: Progressing     Problem: Depression  Goal: Verbalize thoughts and feelings  Description: Interventions:  - Assess and re-assess patient's level of risk   - Engage patient in 1:1 interactions, daily, for a minimum of 15 minutes   - Encourage patient to express feelings, fears, frustrations, hopes   Outcome: Progressing     Problem: Anxiety  Goal: Anxiety is at manageable level  Description: Interventions:  - Assess and monitor patient's anxiety level.   - Monitor for signs and symptoms (heart palpitations, chest pain, shortness of breath, headaches, nausea, feeling jumpy, restlessness, irritable, apprehensive).   - Collaborate with interdisciplinary team and initiate plan and interventions as ordered.  - [place] patient to unit/surroundings  - Explain treatment plan  - Encourage participation in care  - Encourage verbalization of concerns/fears  - Identify coping mechanisms  - Assist in developing anxiety-reducing skills  - Administer/offer alternative therapies  - Limit or eliminate stimulants  Outcome: Progressing

## 2024-11-27 NOTE — TELEPHONE ENCOUNTER
Tried reaching out to patient again via nurses station number left below. No answer. I left my direct # asking to have the patient return my call.

## 2024-11-27 NOTE — PHYSICAL THERAPY NOTE
Physical TherapyTreatment Note    Patient's Name: Samantha Rodriguez    Admitting Diagnosis  Suicidal ideation [R45.851]  Lumbar radiculopathy [M54.16]  Anxiety [F41.9]  Lumbar spondylosis [M47.816]  Depression [F32.A]  Essential hypertension [I10]  Abdominal pain [R10.9]  Post laminectomy syndrome [M96.1]  Chronic pain disorder [G89.4]  MIMI (obstructive sleep apnea) [G47.33]  History of CVA (cerebrovascular accident) [Z86.73]  Spinal stenosis of lumbar region with neurogenic claudication [M48.062]  Medical clearance for psychiatric admission [Z00.8]  Dysphagia, unspecified type [R13.10]    Problem List  Patient Active Problem List   Diagnosis    Chronic pain disorder    Lumbar radiculopathy    Lumbar spondylosis    Fibromyalgia    Spinal stenosis of lumbar region with neurogenic claudication    Bipolar disorder (HCC)    Cognitive disorder    GERD without esophagitis    Generalized anxiety disorder with panic attacks    Essential hypertension    Mood insomnia (HCC)    Migraine    Overactive bladder    PTSD (post-traumatic stress disorder)    Tremor    Urinary incontinence    Vitamin D deficiency    Post laminectomy syndrome    Obesity, morbid (HCC)    MIMI (obstructive sleep apnea)    Tardive dyskinesia    Status post insertion of spinal cord stimulator    Ambulatory dysfunction    Dysphagia    History of CVA (cerebrovascular accident)    Medical clearance for psychiatric admission    Mixed hyperlipidemia    CVA (cerebral vascular accident) (HCC)    Hemiplegia, post-stroke (HCC)    Memory loss    Numbness    Bipolar I disorder, most recent episode depressed (HCC)    Panic disorder without agoraphobia    Chronic low back pain, unspecified back pain laterality, unspecified whether sciatica present    Opioid-induced constipation    Weight gain    Severe episode of recurrent major depressive disorder, without psychotic features (HCC)       Past Medical History  Past Medical History:   Diagnosis Date    Anxiety      Arthritis     left knee    Arthritis of right knee 10/6/2020    At risk for falls     Bipolar 2 disorder (HCC)     FOLLOWS WITH PSYCHIATRIST. CONTINUE LAMOTRIGINE; RESOLVED: 2016    Depression     Familial tremor     both hands    Fibromyalgia     LAST ASSESSED: 2017    GERD (gastroesophageal reflux disease)     Hearing aid worn     left ear    [place] (hard of hearing)     left ear    Hyperlipidemia     Hypertension     Left-sided weakness     Lumbar disc disease with radiculopathy 2018    Memory loss of unknown cause     long and short term    Migraine     Multiple closed fractures of metatarsal bone of right foot 2021    Obesity     Obesity, Class II, BMI 35-39.9     Osteoarthritis of both hips 10/31/2016    Osteoarthritis of knee 2013    Description: Continue Tylenol and Naproxen. Encourage exercise and weight loss. Patient refused physical therapy. I will refer the patient back to Orthopedics.    Overactive bladder     Panic attack     Patellofemoral disorder of both knees 2020    Post traumatic stress disorder     Primary localized osteoarthritis of both knees 2017    Primary osteoarthritis of both knees 2020    S/P insertion of spinal cord stimulator     no remote    S/P total knee arthroplasty, right 3/10/2022    Sacroiliitis (HCC) 2017    Seasonal allergies     Seizure-like activity (HCC) 6/3/2022    Seizures (HCC)     possible seizure like activity    Small bowel obstruction (HCC) 3/24/2023    Status post total knee replacement, left 2022    Stroke (HCC)     questionable stroke 2009    Tardive dyskinesia     PATIENT STATES    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  CONT ASA 81 MG DAILY; RESOLVED: [id number]    Urinary incontinence     Uses walker     Wears dentures     partial lower / full upper- doesnt wear    Wears glasses        Past Surgical History  Past Surgical History:   Procedure Laterality Date    BACK SURGERY       SECTION   1986    COLONOSCOPY      RESOLVED: [id number]    EAR SURGERY      EGD      HYSTERECTOMY  2004    MYRINGOTOMY W/ TUBES Left     NECK SURGERY  04/2019    NH ARTHRP KNE CONDYLE&PLATU MEDIAL&LAT COMPARTMENTS Right 3/9/2022    Procedure: ARTHROPLASTY KNEE TOTAL;  Surgeon: Chandni Tuttle DO;  Location: [facility];  Service: Orthopedics    NH ARTHRP KNE CONDYLE&PLATU MEDIAL&LAT COMPARTMENTS Left 7/5/2022    Procedure: ARTHROPLASTY KNEE TOTAL;  Surgeon: Chandni Tuttle DO;  Location: [facility];  Service: Orthopedics    NH CYSTOURETHROSCOPY N/A 2/18/2016    Procedure: CYSTOSCOPY, BOTOX INJECTION;  Surgeon: Abraham Teague MD;  Location: [facility];  Service: Gynecology    NH INSJ/RPLCMT SPINAL NPG/RCVR POCKET CRTJ&CONNJ Right 2/10/2021    Procedure: REPLACEMENT IMPLANTABLE PULSE GENERATOR DORSAL SPINAL COLUMN STIMULATOR, RIGHT;  Surgeon: Carlos Alberto Jacome MD;  Location: [facility];  Service: Neurosurgery    NH PRQ IMPLTJ NSTIM ELECTRODE ARRAY EPIDURAL Right 7/28/2020    Procedure: INSERTION THORACIC DORSAL COLUMN SPINAL CORD STIMULATOR PERCUTANEOUS W IMPLANTABLE PULSE GENERATOR, RIGHT;  Surgeon: Carlos Alberto Jacome MD;  Location: [facility];  Service: Neurosurgery    TONSILLECTOMY      TUBAL LIGATION  1986    UPPER GASTROINTESTINAL ENDOSCOPY  09/2020       Recent Imaging  No orders to display       Recent Vital Signs  Vitals:    11/26/24 0819 11/26/24 1600 11/26/24 2044 11/27/24 0811   BP: 118/57 125/78 106/65 120/64   BP Location: Right arm Left arm Left arm Right arm   Pulse: 70 82 76 74   Resp: 18 17 16 18   Temp: 97.6 °F (36.4 °C) (!) 97 °F (36.1 °C) 97.7 °F (36.5 °C) 97.7 °F (36.5 °C)   TempSrc: Temporal Temporal Temporal Temporal   SpO2: 94% 95% 96% 95%   Weight:       Height:            11/27/24 1000   PT Last Visit   PT Visit Date 11/27/24   Note Type   Note Type Treatment   Pain Assessment   Pain Assessment Tool 0-10   Pain Score 9   Pain Location/Orientation Orientation: Bilateral;Orientation:  Upper;Location: Leg   Restrictions/Precautions   Weight Bearing Precautions Per Order No   Other Precautions Pain;Fall Risk   General   Chart Reviewed Yes   Response to Previous Treatment Patient with no complaints from previous session.   Family/Caregiver Present No   Cognition   Overall Cognitive Status WFL   Arousal/Participation Alert   Attention Within functional limits   Orientation Level Oriented to person;Oriented to place;Oriented to situation   Memory Within functional limits   Following Commands Follows all commands and directions without difficulty   Transfers   Sit to Stand 5  Supervision   Additional items Increased time required   Stand to Sit 5  Supervision   Additional items Increased time required   Ambulation/Elevation   Gait pattern Step through pattern;Decreased toe off;Decreased heel strike;Excessively slow;Short stride;Shuffling;Decreased foot clearance;Wide CALLY;Improper Weight shift   Gait Assistance 5  Supervision   Additional items Verbal cues   Assistive Device Rolling walker   Distance 50ft x3   Stair Management Assistance 3  Moderate assist   Additional items Assist x 1;Tactile cues;Verbal cues;Increased time required   Stair Management Technique Step to pattern;Foreward;Two rails   Number of Stairs 4   Balance   Static Sitting Fair +   Dynamic Sitting Fair   Static Standing Fair -   Dynamic Standing Fair -   Ambulatory Fair -   Endurance Deficit   Endurance Deficit Yes   Endurance Deficit Description pain and fatigue   Activity Tolerance   Activity Tolerance Patient limited by fatigue;Patient limited by pain   Medical Staff Made Aware spoke to CM   Nurse Made Aware spoke to RN   Assessment   Prognosis Good   Problem List Decreased strength;Decreased range of motion;Decreased endurance;Impaired balance;Decreased mobility;Decreased coordination;Pain;Impaired sensation;Obesity   Assessment Pt continues to be limited due to pain and weakness in the LEs. Unable to stand fully upright due to  limitations to the lumbar spine including pain, weakness and lack of ROM. Balance is reduced due to decreased sensation and coordination of the LEs. Activity tolerance is limited and not able to negotiate stairs without assistance. Continue to recommend inpt rehab at this time.   Barriers to Discharge Inaccessible home environment;Decreased caregiver support   Goals   Patient Goals to get back surgery so can start working on walking without RW   STG Expiration Date 11/30/24   Short Term Goal #1 see eval note   PT Treatment Day 1   Plan   Treatment/Interventions ADL retraining;Functional transfer training;LE strengthening/ROM;Elevations;Therapeutic exercise;Endurance training;Patient/family training;Gait training;Equipment eval/education;Bed mobility;Spoke to nursing;Spoke to case management;OT   Progress Progressing toward goals   PT Frequency 2-3x/wk   Discharge Recommendation   Rehab Resource Intensity Level, PT II (Moderate Resource Intensity)   Equipment Recommended Walker   Walker Package Recommended Wheeled walker   AM-PAC Basic Mobility Inpatient   Turning in Flat Bed Without Bedrails 3   Lying on Back to Sitting on Edge of Flat Bed Without Bedrails 3   Moving Bed to Chair 3   Standing Up From Chair Using Arms 3   Walk in Room 3   Climb 3-5 Stairs With Railing 2   Basic Mobility Inpatient Raw Score 17   Basic Mobility Standardized Score 39.67   [facility] Highest Level Of Mobility   -HLM Goal 5: Stand one or more mins   JH-HLM Achieved 7: Walk 25 feet or more   Education   Education Provided Mobility training;Home exercise program;Assistive device   Patient Explanation/teachback used;Demonstrates verbal understanding   End of Consult   Patient Position at End of Consult Bedside chair       Sanket Edwards PT, DPT

## 2024-11-27 NOTE — ASSESSMENT & PLAN NOTE
Continue Zoloft 200 mg at bedtime for ongoing symptoms of depression and anxiety  Continue Lyrica to 150 mg three times daily for anxiety and chronic pain  Continue BuSpar to 15 mg three times daily for generalized anxiety  At this time will avoid benzodiazepine use in the setting of current morphine use

## 2024-11-27 NOTE — QUICK NOTE
"RESIDENT ATTESTATION - I have reviewed all components of the note performed and documented by the Resident. I interviewed, took the history, personally performed all the required components and examined the patient on 11/27/24. I discussed the case with the Resident and reviewed the Resident’s note, prescribed medications, and orders placed. I supervised the Resident and I was available for discussion. I agree with the Resident management plan as it was presented to me. I attest that this information is true, accurate and complete to the best of my knowledge.    Assessment & Plan   Principal Problem:    Severe episode of recurrent major depressive disorder, without psychotic features (HCC)  Active Problems:    Lumbar spondylosis    Generalized anxiety disorder with panic attacks    Mood insomnia (HCC)    PTSD (post-traumatic stress disorder)    Obesity, morbid (HCC)    Tardive dyskinesia    History of CVA (cerebrovascular accident)    Medical clearance for psychiatric admission    Mixed hyperlipidemia    Opioid-induced constipation    Samantha is anxious today, says she is having a \"panic attack\" and continues to request additional medications.  \"Come closer, can you see me shaking?\"  Support was provided to patient, positive thinking was encouraged, coping mechanisms were offered.  Nursing was notified and patient administered as needed medications for anxiety.  We will continue current medications otherwise, continue to monitor symptoms.  Patient denies adverse effects from her medication and was in agreement with plan.    Vital signs in last 24 hours:    Temp:  [97 °F (36.1 °C)-97.7 °F (36.5 °C)] 97.7 °F (36.5 °C)  HR:  [74-82] 80  BP: (106-137)/(64-78) 137/77  Resp:  [16-18] 18  SpO2:  [95 %-96 %] 95 %  O2 Device: None (Room air)    Laboratory Results: I have personally reviewed all pertinent laboratory/tests results    Most Recent Labs:   Lab Results   Component Value Date    WBC 4.63 11/19/2024    RBC 4.45 " 11/19/2024    HGB 12.9 11/19/2024    HCT 39.7 11/19/2024     11/19/2024    RDW 13.2 11/19/2024    NEUTROABS 2.34 11/19/2024    TOTANEUTABS 0.83 (L) 05/25/2023    SODIUM 139 11/21/2024    K 4.0 11/21/2024     11/21/2024    CO2 28 11/21/2024    BUN 14 11/21/2024    CREATININE 0.68 11/21/2024    GLUC 94 11/21/2024    CALCIUM 8.3 (L) 11/21/2024    AST 17 11/19/2024    ALT 17 11/19/2024    ALKPHOS 79 11/19/2024    TP 6.2 (L) 11/19/2024    ALB 3.8 11/19/2024    TBILI 0.73 11/19/2024    CHOLESTEROL 99 11/19/2024    HDL 49 (L) 11/19/2024    TRIG 40 11/19/2024    LDLCALC 42 11/19/2024    NONHDLC 50 11/19/2024    LITHIUM <0.1 (L) 03/23/2023    AMMONIA <10 (L) 06/27/2021    MUO9SSYIIPDE 1.759 11/16/2024    FREET4 0.80 02/13/2020    PREGSERUM Negative 02/22/2014    HGBA1C 4.8 11/18/2024    EAG 91 11/18/2024       Plan:   Treatment plan, treatment progress and medication changes were reviewed with nursing staff, Pharmacy Service and Case Management in Treatment Team meeting  Continue current medications.  Current medications:   Current Facility-Administered Medications   Medication Dose Route Frequency Provider Last Rate    acetaminophen  650 mg Oral Q4H PRN MERCY Sanchez      acetaminophen  650 mg Oral Q4H PRN MERCY Sanchez      aluminum-magnesium hydroxide-simethicone  30 mL Oral Q4H PRN MERCY Sanchez      amLODIPine  5 mg Oral Daily MERCY Kim      ARIPiprazole  10 mg Oral Daily Sagar Bucca, DO      aspirin  81 mg Oral Daily MERCY Kim      atorvastatin  40 mg Oral Daily With Dinner MERCY Kim      benztropine  1 mg Intramuscular Q4H PRN Max 6/day MERCY Sanchez      benztropine  0.5 mg Oral Q4H PRN Max 6/day MERCY Sanchez      bisacodyl  10 mg Rectal Daily PRN MERCY Sanchez      busPIRone  15 mg Oral TID Oleksandr Massey MD      Cholecalciferol  1,000 Units Oral Daily MERCY Kim      cyclobenzaprine   10 mg Oral TID PRN Anika Orozcodelphine, BOBBYNP      Diclofenac Sodium  2 g Topical 4x Daily PRN Sandra Dixon, MERCY      hydrOXYzine HCL  100 mg Oral Q6H PRN Oleksandr Massey MD      hydrOXYzine HCL  25 mg Oral Q6H PRN Max 4/day Renae Galvez, MERCY      hydrOXYzine HCL  50 mg Oral Q6H PRN Max 4/day Renae Galvez, MERCY      ibuprofen  600 mg Oral Q8H PRN Anika Thaogeorge, CRNP      loratadine  10 mg Oral Daily Anika Thaogeorge, CRNP      LORazepam  1 mg Intramuscular Q6H PRN Max 3/day MERCY Sanchez      lubiprostone  8 mcg Oral BID Anika Harris, MERCY      magnesium Oxide  400 mg Oral Daily Anika Harris, CRNP      methocarbamol  500 mg Oral Q6H PRN Anika Thaogeorge, CRNP      morphine  15 mg Oral Q6H PRN MERCY Sanchez      OLANZapine  5 mg Intramuscular Q3H PRN Max 3/day MERCY Sanchez      OLANZapine  2.5 mg Oral Q4H PRN Max 6/day Renae Galvez, BOBBYNP      OLANZapine  5 mg Oral Q4H PRN Max 3/day Renae Galvez, MERCY      OLANZapine  5 mg Oral Q3H PRN Max 3/day MERCY Sanchez      pantoprazole  40 mg Oral Early Morning Anika Harris, BOBBYNP      polyethylene glycol  17 g Oral Daily PRN Renae Galvez, MERCY      polyethylene glycol  17 g Oral Daily Anika Bean Kimberly, MERCY      prazosin  2 mg Oral HS Oleksandr Massey MD      pregabalin  150 mg Oral TID Oleksandr Massey MD      propranolol  5 mg Oral Q8H PRN Sagar Jones DO      senna-docusate sodium  1 tablet Oral Daily PRN MERCY Sanchez      sertraline  200 mg Oral HS Oleksandr Massey MD      traZODone  200 mg Oral HS Oleksandr Massey MD      Valbenazine Tosylate  60 mg Oral Daily Anika GenevaMERCY Daniels       Treatment plan and proposed medication changes discussed with patient.  Patient's diagnosis reviewed with patient.  Risks and benefits and possible side effects of medications discussed with patient. Patient verbalizes understanding and  agreement for treatment.  Estimated Discharge Date: 12/2/2024  Legal Status: Voluntary 201 commitment.    Sagar Jones,  11/27/24

## 2024-11-27 NOTE — TREATMENT TEAM
11/26/24 0171   Pain Assessment   Pain Assessment Tool 0-10   Pain Score 8   Pain Location/Orientation Orientation: Bilateral;Location: Arm   Pain Onset/Description Onset: Ongoing   Patient's Stated Pain Goal No pain   Hospital Pain Intervention(s) Medication (See MAR)     Ibuprofen 600 mg given for 8/10 bilateral arm pain.

## 2024-11-27 NOTE — PLAN OF CARE
Problem: PHYSICAL THERAPY ADULT  Goal: Performs mobility at highest level of function for planned discharge setting.  See evaluation for individualized goals.  Description: Treatment/Interventions: ADL retraining, Functional transfer training, LE strengthening/ROM, Elevations, Therapeutic exercise, Endurance training, Patient/family training, Gait training, Equipment eval/education, Bed mobility, Spoke to nursing, Spoke to case management, OT  Equipment Recommended: Walker       See flowsheet documentation for full assessment, interventions and recommendations.  Outcome: Progressing  Note: Prognosis: Good  Problem List: Decreased strength, Decreased range of motion, Decreased endurance, Impaired balance, Decreased mobility, Decreased coordination, Pain, Impaired sensation, Obesity  Assessment: Pt continues to be limited due to pain and weakness in the LEs. Unable to stand fully upright due to limitations to the lumbar spine including pain, weakness and lack of ROM. Balance is reduced due to decreased sensation and coordination of the LEs. Activity tolerance is limited and not able to negotiate stairs without assistance. Continue to recommend inpt rehab at this time.  Barriers to Discharge: Inaccessible home environment, Decreased caregiver support     Rehab Resource Intensity Level, PT: II (Moderate Resource Intensity)    See flowsheet documentation for full assessment.

## 2024-11-27 NOTE — PLAN OF CARE
Problem: OCCUPATIONAL THERAPY ADULT  Goal: Performs self-care activities at highest level of function for planned discharge setting.  See evaluation for individualized goals.  Description: Treatment Interventions: ADL retraining, Functional transfer training, UE strengthening/ROM, Endurance training, Continued evaluation, Energy conservation, Activityengagement          See flowsheet documentation for full assessment, interventions and recommendations.   Outcome: Progressing  Note: Limitation: Decreased high-level ADLs, Decreased Safe judgement during ADL, Decreased cognition, Decreased endurance, Decreased ADL status  Prognosis: Good  Assessment: Pt is a 58 y.o. female seen for OT evaluation s/p admit to [facility] on 11/16/2024 w/ Severe episode of recurrent major depressive disorder, without psychotic features (HCC).  See medical history above for extensive list of comorbidities affecting pt's functional performance at time of assessment. Personal factors affecting Pt at time of IE include:difficulty performing ADLS, difficulty performing IADLS , and health management . Upon evaluation: Pt requires supervision for functional ambulation including bathroom mobility and negotiation of small spaces, supervision for ADL transfers.  Pt requires Lisandro for ADLs in standing. Pt's primary barrier(s) at this time: decreased ROM, educated re: figure 4 sit. Pt with decreased BLE sensation, increased pain, thus with difficulty managing LB ADLs. The following deficits impact occupational performance: weakness, decreased strength, decreased balance, decreased tolerance, and decreased safety awareness. Pt to benefit from continued skilled OT services while in the hospital to address deficits as defined above and maximize level of functional independence w ADL's and functional mobility. Occupational performance areas to address include: bathing/shower, toilet hygiene, dressing, health maintenance, functional mobility, and clothing  management. From OT standpoint, recommendation at time of d/c would be moderate resource intensity.       Rehab Resource Intensity Level, OT: II (Moderate Resource Intensity)

## 2024-11-27 NOTE — TREATMENT TEAM
11/27/24 0904   Team Meeting   Meeting Type Daily Rounds   Initial Conference Date 11/27/24   Team Members Present   Team Members Present Physician;Nurse;;Occupational Therapist   Physician Team Member Dr Palafox, Dr Jones, Dr Massey, Renae MADRID   Nursing Team Member Julienne   Care Management Team Member Lory MERCADO Team Member Tyler   Patient/Family Present   Patient Present No   Patient's Family Present No     Withdrawn, endorses depression and anxiety, PRN Atarax 100 mg at 2245 with good effect, not attending group, slept

## 2024-11-27 NOTE — NURSING NOTE
Propanol given for anxiety, patient reports PRN effective. Patient is visible at the moment, socializing with peers in the dayroom. Patient is medication and meals compliant. Safety checks maintained.
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
## 2024-11-27 NOTE — PROGRESS NOTES
Progress Note - Behavioral Health   Name: Samantha Rodriguez 58 y.o. female I MRN: [id number]  Unit/Bed#: [facility] 606-01 I Date of Admission: 11/16/2024   Date of Service: 11/27/2024 I Hospital Day: 9    Assessment & Plan  Severe episode of recurrent major depressive disorder, without psychotic features (HCC)  Continue Zoloft 200 mg at bedtime for ongoing symptoms of depression and anxiety  Continue Abilify 10 mg daily for antidepressant augmentation (increased 11/23/2024)    Generalized anxiety disorder with panic attacks  Continue Zoloft 200 mg at bedtime for ongoing symptoms of depression and anxiety  Continue Lyrica to 150 mg three times daily for anxiety and chronic pain  Continue BuSpar to 15 mg three times daily for generalized anxiety  At this time will avoid benzodiazepine use in the setting of current morphine use  PTSD (post-traumatic stress disorder)  Continue prazosin 2 mg at night for nightmares  Continue ongoing medication management   Lumbar spondylosis  Pain management per medical team  Obesity, morbid (HCC)  Management per medical team  Tardive dyskinesia  Continue Ingrezza 60 mg daily  History of CVA (cerebrovascular accident)  Management per medical team  Medical clearance for psychiatric admission  Management per medical team  Mixed hyperlipidemia  Management per medical team  Opioid-induced constipation  Management per medical team  Mood insomnia (HCC)  Continue Trazodone to 200 mg at bedtime for insomnia    Progress Toward Goals: Slow improvement    Recommended Treatment: Continue with group therapy, milieu therapy and occupational therapy.      Risks, benefits and possible side effects of Medications:   Risks, benefits, and possible side effects of medications explained to patient and patient verbalizes understanding.      History of Present Illness   Behavior over the last 24 hours:  unchanged  Sleep: Normal  Appetite: Normal  Medication side effects: No  ROS: Patient continues to report chronic  "severe pain largely unchanged in severity    Subjective:    Patient was seen today for continuity of care. Prior to patient interview, all available and pertinent information was reviewed including prior documentation, laboratory results, and imaging studies as applicable.    Today, Samantha continues to feel \"anxious.\"  She reports that she continues to have feelings of apprehension surrounding her plan to return to rehab facility upon hospital discharge ([facility]).  She continues to intermittently struggle with panic attacks, she reports experiencing 1 panic attack yesterday in the evening (11/26/2024 at 2244 - the patient was given as needed Atarax 100 mg at the time).  Cognitive reframing strategies were utilized and it was highlighted to the patient that her panic attacks are becoming less frequent.  Samantha agreed and reports that her panic attacks are indeed becoming less frequent and less severe.  She was encouraged to continue participating in the unit milieu and continue employing coping skills strategies with the help of the unit therapist.    Today, Samantha is forward thinking.  She reports that she is hopeful to be discharged early next week so she can make multiple appointments, including an appointment with her family medicine doctor.    Samantha reports she continues to spend her time on the unit talking with family members (particularly her daughter).  She continues to attend groups regularly.    At the time of interview, Samantha reports fleeting suicidal ideation (with brief visions of her stabbing herself).  She adamantly states she will not act on these thoughts.  She denies homicidal ideation, visual and auditory hallucinations.    Objective   Mental Status Evaluation:  Appearance:  Age appropriate, casually dressed, good eye contact, no acute distress   Behavior:  Calm, pleasant, cooperative   Speech:  Dysarthric, normal rate, normal volume   Mood:  \"Anxious\"   Affect:  Remains anxious in affect "   Thought Process:  Organized, linear, goal directed   Associations: intact associations   Thought Content:  Continues to remain preoccupied that she will have a panic attack in the future, continues to remain with negative filtering and catastrophizing   Perceptual Disturbances: Denies visual and auditory hallucinations, does not appear responding to internal stimuli   Risk Potential: Suicidal Ideations-reports fleeting suicidal ideation (with brief visions of her stabbing herself).  She adamantly states she will not act on these thoughts.  Homicidal Ideations-denies  Potential for Aggression No   Sensorium:  person, place, and time/date   Memory:  recent and remote memory grossly intact   Consciousness:  alert and awake    Attention: attention span appeared shorter than expected for age   Insight:  limited   Judgment: fair   Gait/Station: Stable gait with assistive device (walker)   Motor Activity: At this time patient remains with mild repetitive and rhythmic involuntary movements of the mouth and tongue.  These movements have markedly improved during hospitalization with current medication management     Medications: all current active meds have been reviewed and continue current psychiatric medications.      Lab Results: I have reviewed the following results:  Most Recent Labs:   Lab Results   Component Value Date    WBC 4.63 11/19/2024    RBC 4.45 11/19/2024    HGB 12.9 11/19/2024    HCT 39.7 11/19/2024     11/19/2024    RDW 13.2 11/19/2024    NEUTROABS 2.34 11/19/2024    SODIUM 139 11/21/2024    K 4.0 11/21/2024     11/21/2024    CO2 28 11/21/2024    BUN 14 11/21/2024    CREATININE 0.68 11/21/2024    GLUC 94 11/21/2024    GLUF 94 11/21/2024    CALCIUM 8.3 (L) 11/21/2024    AST 17 11/19/2024    ALT 17 11/19/2024    ALKPHOS 79 11/19/2024    TP 6.2 (L) 11/19/2024    ALB 3.8 11/19/2024    TBILI 0.73 11/19/2024    CHOLESTEROL 99 11/19/2024    HDL 49 (L) 11/19/2024    TRIG 40 11/19/2024    LDLCALC 42  11/19/2024    NONHDLC 50 11/19/2024    LITHIUM <0.1 (L) 03/23/2023    AMMONIA <10 (L) 06/27/2021    CSY5OXMSRZJD 1.759 11/16/2024    FREET4 0.80 02/13/2020    PREGSERUM Negative 02/22/2014    RPR Non-Reactive 06/29/2021    HGBA1C 4.8 11/18/2024    EAG 91 11/18/2024       Oleksandr Massey MD

## 2024-11-28 PROCEDURE — 99232 SBSQ HOSP IP/OBS MODERATE 35: CPT

## 2024-11-28 RX ADMIN — TRAZODONE HYDROCHLORIDE 200 MG: 100 TABLET ORAL at 21:49

## 2024-11-28 RX ADMIN — PRAZOSIN HYDROCHLORIDE 2 MG: 1 CAPSULE ORAL at 21:49

## 2024-11-28 RX ADMIN — POLYETHYLENE GLYCOL 3350 17 G: 17 POWDER, FOR SOLUTION ORAL at 08:12

## 2024-11-28 RX ADMIN — IBUPROFEN 600 MG: 600 TABLET, FILM COATED ORAL at 01:50

## 2024-11-28 RX ADMIN — ATORVASTATIN CALCIUM 40 MG: 40 TABLET, FILM COATED ORAL at 16:31

## 2024-11-28 RX ADMIN — Medication 2 G: at 23:28

## 2024-11-28 RX ADMIN — PREGABALIN 150 MG: 100 CAPSULE ORAL at 08:21

## 2024-11-28 RX ADMIN — HYDROXYZINE HYDROCHLORIDE 100 MG: 50 TABLET, FILM COATED ORAL at 12:53

## 2024-11-28 RX ADMIN — BUSPIRONE HYDROCHLORIDE 15 MG: 15 TABLET ORAL at 16:31

## 2024-11-28 RX ADMIN — Medication 1000 UNITS: at 08:11

## 2024-11-28 RX ADMIN — AMLODIPINE BESYLATE 5 MG: 5 TABLET ORAL at 08:11

## 2024-11-28 RX ADMIN — VALBENAZINE 60 MG: 60 CAPSULE ORAL at 08:20

## 2024-11-28 RX ADMIN — LUBIPROSTONE 8 MCG: 8 CAPSULE, GELATIN COATED ORAL at 08:12

## 2024-11-28 RX ADMIN — ARIPIPRAZOLE 10 MG: 10 TABLET ORAL at 08:11

## 2024-11-28 RX ADMIN — PREGABALIN 150 MG: 100 CAPSULE ORAL at 21:49

## 2024-11-28 RX ADMIN — Medication 400 MG: at 08:12

## 2024-11-28 RX ADMIN — Medication 2 G: at 14:49

## 2024-11-28 RX ADMIN — BUSPIRONE HYDROCHLORIDE 15 MG: 15 TABLET ORAL at 08:11

## 2024-11-28 RX ADMIN — LUBIPROSTONE 8 MCG: 8 CAPSULE, GELATIN COATED ORAL at 21:48

## 2024-11-28 RX ADMIN — LORATADINE 10 MG: 10 TABLET ORAL at 08:12

## 2024-11-28 RX ADMIN — PREGABALIN 150 MG: 100 CAPSULE ORAL at 16:31

## 2024-11-28 RX ADMIN — PANTOPRAZOLE SODIUM 40 MG: 40 TABLET, DELAYED RELEASE ORAL at 06:26

## 2024-11-28 RX ADMIN — SERTRALINE HYDROCHLORIDE 200 MG: 100 TABLET ORAL at 21:49

## 2024-11-28 RX ADMIN — PROPRANOLOL HYDROCHLORIDE 5 MG: 10 TABLET ORAL at 12:53

## 2024-11-28 RX ADMIN — IBUPROFEN 600 MG: 600 TABLET, FILM COATED ORAL at 16:31

## 2024-11-28 RX ADMIN — BUSPIRONE HYDROCHLORIDE 15 MG: 15 TABLET ORAL at 21:49

## 2024-11-28 RX ADMIN — METHOCARBAMOL TABLETS 500 MG: 500 TABLET, COATED ORAL at 01:50

## 2024-11-28 RX ADMIN — ASPIRIN 81 MG: 81 TABLET, COATED ORAL at 08:11

## 2024-11-28 NOTE — NURSING NOTE
PRN Atarax and Inderal appear to have been effective. Pt currently in bed; appears to be sleeping, no signs of distress.

## 2024-11-28 NOTE — PROGRESS NOTES
Progress Note - Behavioral Health   Name: Samantha Rodriguez 58 y.o. female I MRN: [id number]  Unit/Bed#: [facility] 606[facility]01 I Date of Admission: 11/16/2024   Date of Service: 11/28/2024 I Hospital Day: 10     Assessment & Plan  Severe episode of recurrent major depressive disorder, without psychotic features (HCC)  Continue Zoloft 200 mg at bedtime for ongoing symptoms of depression and anxiety  Continue Abilify 10 mg daily for antidepressant augmentation (increased 11/23/2024)    Generalized anxiety disorder with panic attacks  Continue Zoloft 200 mg at bedtime for ongoing symptoms of depression and anxiety  Continue Lyrica to 150 mg three times daily for anxiety and chronic pain  Continue BuSpar to 15 mg three times daily for generalized anxiety  At this time will avoid benzodiazepine use in the setting of current morphine use  PTSD (post-traumatic stress disorder)  Continue prazosin 2 mg at night for nightmares  Continue ongoing medication management   Lumbar spondylosis  Pain management per medical team  Obesity, morbid (HCC)  Management per medical team  Tardive dyskinesia  Continue Ingrezza 60 mg daily  History of CVA (cerebrovascular accident)  Management per medical team  Medical clearance for psychiatric admission  Management per medical team  Mixed hyperlipidemia  Management per medical team  Opioid-induced constipation  Management per medical team  Mood insomnia (HCC)  Continue Trazodone to 200 mg at bedtime for insomnia    Recommended Treatment:     Planned medication and treatment changes:    All current active medications have been reviewed  Encourage group therapy, milieu therapy and occupational therapy  Behavioral Health checks for safety monitoring  Continue current medications:    Current medications:  Current Facility-Administered Medications   Medication Dose Route Frequency Provider Last Rate    acetaminophen  650 mg Oral Q4H PRN MERCY Sanchez      acetaminophen  650 mg Oral Q4H PRN Renae Galvez  CRNP      aluminum-magnesium hydroxide-simethicone  30 mL Oral Q4H PRN Renae Galvez, CRNP      amLODIPine  5 mg Oral Daily Anika Thaogeorge, CRNP      ARIPiprazole  10 mg Oral Daily Sagar Bucca, DO      aspirin  81 mg Oral Daily Anika Thaogeorge, CRNP      atorvastatin  40 mg Oral Daily With Dinner Anika Bean Kimberly, CRNP      benztropine  1 mg Intramuscular Q4H PRN Max 6/day Renae Galvez, CRNP      benztropine  0.5 mg Oral Q4H PRN Max 6/day Renae Galvez, BOBBYNP      bisacodyl  10 mg Rectal Daily PRN Renae Galvez, BOBBYNP      busPIRone  15 mg Oral TID Oleksandr Massey MD      Cholecalciferol  1,000 Units Oral Daily Anika Orozcodelphine, CRNP      cyclobenzaprine  10 mg Oral TID PRN Anika Bean Kimberly, BOBBYNP      Diclofenac Sodium  2 g Topical 4x Daily PRN Sandra Dixon, BOBBYNP      hydrOXYzine HCL  100 mg Oral Q6H PRN Oleksandr Massey MD      hydrOXYzine HCL  25 mg Oral Q6H PRN Max 4/day Renae Galvez, BOBBYNP      hydrOXYzine HCL  50 mg Oral Q6H PRN Max 4/day Renae Galvez, BOBBYNP      ibuprofen  600 mg Oral Q8H PRN Anika Orozcodelphine, CRNP      loratadine  10 mg Oral Daily Anika Thaogeorge, CRNP      LORazepam  1 mg Intramuscular Q6H PRN Max 3/day Renae Galvez, BOBBYNP      lubiprostone  8 mcg Oral BID Anika Thaogeorge, CRNP      magnesium Oxide  400 mg Oral Daily Anika Thaogeorge, CRNP      methocarbamol  500 mg Oral Q6H PRN Anika Orozcodelphine, CRNP      morphine  15 mg Oral Q6H PRN Renae Galvez, BOBBYNP      OLANZapine  5 mg Intramuscular Q3H PRN Max 3/day Renae Galvez, BOBBYNP      OLANZapine  2.5 mg Oral Q4H PRN Max 6/day Renae Galvez, BOBBYNP      OLANZapine  5 mg Oral Q4H PRN Max 3/day Renae Galvez, BOBBYNP      OLANZapine  5 mg Oral Q3H PRN Max 3/day Renae Hangey, CRNP      pantoprazole  40 mg Oral Early Morning MERCY Kim      polyethylene glycol  17 g Oral Daily PRN MERCY Sanchez      polyethylene glycol  17 g Oral Daily Anika Bean  "MERCY Harris      prazosin  2 mg Oral HS Oleksandr Massey MD      pregabalin  150 mg Oral TID Oleksandr Massey MD      propranolol  5 mg Oral Q8H PRN Sagar Bucca, DO      senna-docusate sodium  1 tablet Oral Daily PRN MERCY Sanchez      sertraline  200 mg Oral HS Oleksandr Massey MD      traZODone  200 mg Oral HS Oleksandr Massey MD      Valbenazine Tosylate  60 mg Oral Daily Anika Bean MERCY Harris         Risks / Benefits of Treatment:    Risks, benefits, and possible side effects of medications explained to patient and patient verbalizes understanding and agreement for treatment.    Subjective:    Patient was seen today for continuation of care, records reviewed and patient was discussed with the morning case review team. Per nursing report, patient continues to deny all psychiatric complaints but appears withdrawn after meals. Patient has been showing slow improvement. She remains medication/meal compliant. No behavioral concerns. No acute events in the past 24 hours.    Samantha was seen today for psychiatric follow-up. On assessment today, Samantha was approached while laying in bed. She states that her mood today is \"so-so\". Sleep has been fluctuating and she reports that it was not the best last night. Patient states that she is feeling tired today and she is going to take a nap. Reports that appetite has been good. She denies having any major questions or concerns to discuss at this time.      Samantha denies acute suicidal/self-harm ideation/intent/plan upon direct inquiry at this time. Samantha remains behaviorally appropriate, no agitation or aggression noted on exam or in report. Samantha also denies HI/AH/VH, and does not appear overtly manic. No overt delusions or paranoia are verbalized. Samantha remains adherent to her current psychotropic medication regimen and denies any side effects from medications, as well as none noted on exam.    Behavior over the " "last 24 hours: slowly improving.     Sleep:  Appropriate  Appetite:  Appropriate  Medication side effects:  Denies    ROS: no complaints    Mental Status Evaluation:    Appearance:  age appropriate, casually dressed, good eye contact   Behavior:  pleasant, cooperative, calm   Speech:  normal rate and volume   Mood:  \"So-so\"   Affect:  constricted   Thought Process:  organized, linear   Associations: intact associations   Thought Content:  no overt delusions   Perceptual Disturbances: denies auditory or visual hallucinations when asked   Risk Potential: Suicidal ideation - None at present  Homicidal ideation - None at present  Potential for aggression - Not at present   Sensorium:  oriented to person, place, time/date, and situation   Memory:  recent and remote memory grossly intact   Consciousness:  alert and awake   Attention/Concentration: attention span and concentration appear shorter than expected for age   Insight:  limited   Judgment: fair   Gait/Station: in bed   Motor Activity: Mild repetitive and rhythmic involuntary movements of the mouth and tongue     Vital signs in last 24 hours:    Temp:  [96.8 °F (36 °C)-97.1 °F (36.2 °C)] 96.8 °F (36 °C)  HR:  [67-80] 75  BP: (126-162)/(61-92) 139/92  Resp:  [16-18] 16  SpO2:  [96 %-98 %] 96 %  O2 Device: None (Room air)    Laboratory results: I have personally reviewed all pertinent laboratory/tests results    Results from the past 24 hours: No results found for this or any previous visit (from the past 24 hours).    Suicide/Homicide Risk Assessment:    Risk of Harm to Self:   Nursing Suicide Risk Assessment Last 24 hours: C-SSRS Risk (Since Last Contact)  Calculated C-SSRS Risk Score (Since Last Contact): No Risk Indicated    Risk of Harm to Others:  Nursing Homicide Risk Assessment: Violence Risk to Others: Denies within past 6 months    The following interventions are recommended: Behavioral Health checks for safety monitoring    Progress Toward " Goals: progressing    Counseling / Coordination of Care:    Total floor / unit time spent today 25 minutes. Greater than 50% of total time was spent with the patient and / or family counseling and / or coordination of care. A description of counseling / coordination of care:  Patient's progress reviewed with nursing staff.  Medication education provided to patient.    This note was not shared with the patient due to reasonable likelihood of causing patient harm    Renae Fonseca PA-C 11/28/24

## 2024-11-28 NOTE — NURSING NOTE
Pt complains of severe anxiety and restlessness. Pt is unable to identify trigger at this time. Remains mostly isolative to room. Pt describes having panic attacks at times. Requests and is administered PRN Atarax 100 mg w/ Inderal 5 mg for a puente of 27. Coping skills encouraged along w/ increased visibility and socialization on the unit. Pt denies all other psych S+S's at this time. Will monitor for medication effectiveness.

## 2024-11-28 NOTE — NURSING NOTE
Patient is alert and oriented able to make her needs known. She is visible in the milieu with no peer interactions. She is calm and pleasant on approach. She is medication and meal compliant. She endorses anxiety and depression. PRN atarax 100 mg and PRN propranolol 5 mg given for anxiety and restlessness at 0150, awaiting effectiveness. No behaviors noted this shift. Safety checks ongoing.

## 2024-11-28 NOTE — NURSING NOTE
Patient re-assessed at 0250, She states medications are not effective. Patient requested and was given robaxin 50mg and ibuprofen 600 at 0150, which were effective.

## 2024-11-29 PROCEDURE — 99232 SBSQ HOSP IP/OBS MODERATE 35: CPT | Performed by: PSYCHIATRY & NEUROLOGY

## 2024-11-29 RX ORDER — TRAZODONE HYDROCHLORIDE 100 MG/1
300 TABLET ORAL
Status: DISCONTINUED | OUTPATIENT
Start: 2024-11-29 | End: 2024-12-02 | Stop reason: HOSPADM

## 2024-11-29 RX ORDER — PRAZOSIN HYDROCHLORIDE 1 MG/1
2 CAPSULE ORAL
Status: DISCONTINUED | OUTPATIENT
Start: 2024-11-29 | End: 2024-12-02 | Stop reason: HOSPADM

## 2024-11-29 RX ORDER — PRAZOSIN HYDROCHLORIDE 1 MG/1
3 CAPSULE ORAL
Status: DISCONTINUED | OUTPATIENT
Start: 2024-11-29 | End: 2024-11-29

## 2024-11-29 RX ADMIN — MORPHINE SULFATE 15 MG: 15 TABLET ORAL at 18:23

## 2024-11-29 RX ADMIN — AMLODIPINE BESYLATE 5 MG: 5 TABLET ORAL at 09:28

## 2024-11-29 RX ADMIN — IBUPROFEN 600 MG: 600 TABLET, FILM COATED ORAL at 11:21

## 2024-11-29 RX ADMIN — PANTOPRAZOLE SODIUM 40 MG: 40 TABLET, DELAYED RELEASE ORAL at 06:14

## 2024-11-29 RX ADMIN — Medication 1000 UNITS: at 09:28

## 2024-11-29 RX ADMIN — VALBENAZINE 60 MG: 60 CAPSULE ORAL at 09:31

## 2024-11-29 RX ADMIN — IBUPROFEN 600 MG: 600 TABLET, FILM COATED ORAL at 21:34

## 2024-11-29 RX ADMIN — PROPRANOLOL HYDROCHLORIDE 5 MG: 10 TABLET ORAL at 18:20

## 2024-11-29 RX ADMIN — SERTRALINE HYDROCHLORIDE 200 MG: 100 TABLET ORAL at 21:19

## 2024-11-29 RX ADMIN — ASPIRIN 81 MG: 81 TABLET, COATED ORAL at 09:28

## 2024-11-29 RX ADMIN — LORATADINE 10 MG: 10 TABLET ORAL at 09:28

## 2024-11-29 RX ADMIN — BUSPIRONE HYDROCHLORIDE 15 MG: 15 TABLET ORAL at 17:04

## 2024-11-29 RX ADMIN — POLYETHYLENE GLYCOL 3350 17 G: 17 POWDER, FOR SOLUTION ORAL at 09:30

## 2024-11-29 RX ADMIN — Medication 400 MG: at 09:28

## 2024-11-29 RX ADMIN — ALUMINUM HYDROXIDE, MAGNESIUM HYDROXIDE, AND DIMETHICONE 30 ML: 200; 20; 200 SUSPENSION ORAL at 21:34

## 2024-11-29 RX ADMIN — HYDROXYZINE HYDROCHLORIDE 25 MG: 25 TABLET ORAL at 18:11

## 2024-11-29 RX ADMIN — CYCLOBENZAPRINE HYDROCHLORIDE 10 MG: 10 TABLET, FILM COATED ORAL at 11:20

## 2024-11-29 RX ADMIN — MORPHINE SULFATE 15 MG: 15 TABLET ORAL at 03:20

## 2024-11-29 RX ADMIN — ARIPIPRAZOLE 10 MG: 10 TABLET ORAL at 09:28

## 2024-11-29 RX ADMIN — ALUMINUM HYDROXIDE, MAGNESIUM HYDROXIDE, AND DIMETHICONE 30 ML: 200; 20; 200 SUSPENSION ORAL at 11:20

## 2024-11-29 RX ADMIN — LUBIPROSTONE 8 MCG: 8 CAPSULE, GELATIN COATED ORAL at 09:28

## 2024-11-29 RX ADMIN — BUSPIRONE HYDROCHLORIDE 15 MG: 15 TABLET ORAL at 21:19

## 2024-11-29 RX ADMIN — PREGABALIN 150 MG: 100 CAPSULE ORAL at 21:19

## 2024-11-29 RX ADMIN — TRAZODONE HYDROCHLORIDE 300 MG: 100 TABLET ORAL at 21:19

## 2024-11-29 RX ADMIN — BUSPIRONE HYDROCHLORIDE 15 MG: 15 TABLET ORAL at 09:28

## 2024-11-29 RX ADMIN — PREGABALIN 150 MG: 100 CAPSULE ORAL at 17:04

## 2024-11-29 RX ADMIN — LUBIPROSTONE 8 MCG: 8 CAPSULE, GELATIN COATED ORAL at 21:19

## 2024-11-29 RX ADMIN — PREGABALIN 150 MG: 100 CAPSULE ORAL at 09:28

## 2024-11-29 RX ADMIN — Medication 2 G: at 21:40

## 2024-11-29 RX ADMIN — ATORVASTATIN CALCIUM 40 MG: 40 TABLET, FILM COATED ORAL at 17:04

## 2024-11-29 NOTE — ASSESSMENT & PLAN NOTE
Continue Zoloft 200 mg at bedtime for ongoing symptoms of depression and anxiety  Continue Lyrica to 150 mg three times daily for anxiety and chronic pain  Continue BuSpar to 15 mg three times daily for generalized anxiety  Increase trazodone to 300 mg nightly for insomnia and as an adjunct for mood  Avoid benzodiazepine use in the setting of current morphine use

## 2024-11-29 NOTE — TREATMENT TEAM
11/29/24 1823   Allen Anxiety Scale   Anxious Mood 2   Tension 1   Fears 2   Insomnia 2   Intellectual 2   Depressed Mood 2   Somatic Complaints: Muscular 2   Somatic Complaints: Sensory 0   Cardiovascular Symptoms 0   Respiratory Symptoms 1   Gastrointestinal Symptoms 0   Genitourinary Symptoms 0   Autonomic Symptoms 0   Behavior at Interview 2   Allen Anxiety Score 16     Administered atarax 25 mg for patient c/o anxiety.

## 2024-11-29 NOTE — PLAN OF CARE
Problem: Depression  Goal: Treatment Goal: Demonstrate behavioral control of depressive symptoms, verbalize feelings of improved mood/affect, and adopt new coping skills prior to discharge  Outcome: Progressing  Goal: Verbalize thoughts and feelings  Description: Interventions:  - Assess and re-assess patient's level of risk   - Engage patient in 1:1 interactions, daily, for a minimum of 15 minutes   - Encourage patient to express feelings, fears, frustrations, hopes   Outcome: Progressing  Goal: Refrain from harming self  Description: Interventions:  - Monitor patient closely, per order   - Supervise medication ingestion, monitor effects and side effects   Outcome: Progressing  Goal: Refrain from isolation  Description: Interventions:  - Develop a trusting relationship   - Encourage socialization   Outcome: Progressing  Goal: Refrain from self-neglect  Outcome: Progressing  Goal: Attend and participate in unit activities, including therapeutic, recreational, and educational groups  Description: Interventions:  - Provide therapeutic and educational activities daily, encourage attendance and participation, and document same in the medical record   Outcome: Progressing  Goal: Complete daily ADLs, including personal hygiene independently, as able  Description: Interventions:  - Observe, teach, and assist patient with ADLS  -  Monitor and promote a balance of rest/activity, with adequate nutrition and elimination   Outcome: Progressing     Problem: Anxiety  Goal: Anxiety is at manageable level  Description: Interventions:  - Assess and monitor patient's anxiety level.   - Monitor for signs and symptoms (heart palpitations, chest pain, shortness of breath, headaches, nausea, feeling jumpy, restlessness, irritable, apprehensive).   - Collaborate with interdisciplinary team and initiate plan and interventions as ordered.  - [place] patient to unit/surroundings  - Explain treatment plan  - Encourage participation in  care  - Encourage verbalization of concerns/fears  - Identify coping mechanisms  - Assist in developing anxiety-reducing skills  - Administer/offer alternative therapies  - Limit or eliminate stimulants  Outcome: Progressing     Problem: Potential for Falls  Goal: Patient will remain free of falls  Description: INTERVENTIONS:  - Educate patient/family on patient safety including physical limitations  - Instruct patient to call for assistance with activity   - Consult OT/PT to assist with strengthening/mobility   - Keep Call bell within reach  - Keep bed low and locked with side rails adjusted as appropriate  - Keep care items and personal belongings within reach  - Initiate and maintain comfort rounds  - Make Fall Risk Sign visible to staff  - Offer Toileting every q2 Hours, in advance of need  - Initiate/Maintain bed alarm  - Obtain necessary fall risk management equipment: walker  - Apply yellow socks and bracelet for high fall risk patients  - Consider moving patient to room near nurses station  Outcome: Progressing

## 2024-11-29 NOTE — NURSING NOTE
Patient withdrawn to her room. Patient endorses depression and having panic attacks. Patient is calm and brightens on approach. Patient is medication compliant. Safety measures maintained.

## 2024-11-29 NOTE — TREATMENT TEAM
11/29/24 1823   Pain Assessment   Pain Assessment Tool 0-10   Pain Score 10 - Worst Possible Pain   Pain Location/Orientation Location: Back   Pain Onset/Description Onset: Ongoing   Effect of Pain on Daily Activities mood   Patient's Stated Pain Goal No pain   Hospital Pain Intervention(s) Medication (See MAR)   Multiple Pain Sites No     Administered 15 mg morphine for patient c/o back pain rated 10 on a 0-10 pain scale.

## 2024-11-29 NOTE — TREATMENT TEAM
11/29/24 0317   Pain Assessment   Pain Assessment Tool 0-10   Pain Score 9     Medicated with morphine IR 15 mg

## 2024-11-29 NOTE — NURSING NOTE
Patient withdrawn to room for majority of the shift. Pleasant and cooperative on approach. Reporting anxiety related to discharge. PRN propanolol 5 mg administered for c/o restlessness. Denies SI/HI and AH/VH. Medication compliant.

## 2024-11-29 NOTE — NURSING NOTE
"Patient preoccupied with discharge and stating that she does not feel as though she is ready to go.  Patient states that she does not like how the rehab deals with her anxiety and depression.  Patient requested Maloxx, motrin and muscle relaxer at 1121 which had minimal effect.  Patient was in bed most of shift and needed much encouragement to come out, states that she is \"just not feeling well today\".    "

## 2024-11-29 NOTE — CASE MANAGEMENT
CM notified [facility] of pt's poss d/c Mon vs Tue.   Auth info received from insurance: Auth#: [id number] 12/02-12/09/24, next review 12/9   CM provided auth info to [facility] via [employer].

## 2024-11-29 NOTE — TREATMENT TEAM
11/29/24 0916   Team Meeting   Meeting Type Daily Rounds   Initial Conference Date 11/29/24   Team Members Present   Team Members Present Physician;Nurse;;Occupational Therapist   Physician Team Member Dr Jones, Dr York   Nursing Team Member Julienne   Care Management Team Member Lory MERCADO Team Member Tyler   Patient/Family Present   Patient Present No   Patient's Family Present No     Remains somatic, preoccupied with pain, isolative at times, reports anxiety, restlessness. PRN Atarax 100 mg and Propanolol 5 mg for restlessness; d/c to rehab Monday dependent upon pt's status and PRN usage on weekend.

## 2024-11-29 NOTE — PROGRESS NOTES
Progress Note - Behavioral Health   Name: Samantha Rodriguez 58 y.o. female I MRN: [id number]  Unit/Bed#: [facility] 606-01 I Date of Admission: 11/16/2024   Date of Service: 11/29/2024 I Hospital Day: 11     Assessment & Plan  Severe episode of recurrent major depressive disorder, without psychotic features (HCC)  Continue Zoloft 200 mg at bedtime for ongoing symptoms of depression and anxiety  Continue Abilify 10 mg daily for antidepressant augmentation (increased 11/23/2024)    Generalized anxiety disorder with panic attacks  Continue Zoloft 200 mg at bedtime for ongoing symptoms of depression and anxiety  Continue Lyrica to 150 mg three times daily for anxiety and chronic pain  Continue BuSpar to 15 mg three times daily for generalized anxiety  Increase trazodone to 300 mg nightly for insomnia and as an adjunct for mood  Avoid benzodiazepine use in the setting of current morphine use  PTSD (post-traumatic stress disorder)  Continue prazosin 2 mg at night for nightmares  Continue ongoing medication management   Lumbar spondylosis  Pain management per medical team  Obesity, morbid (HCC)  Management per medical team  Tardive dyskinesia  Continue Ingrezza 60 mg daily  History of CVA (cerebrovascular accident)  Management per medical team  Medical clearance for psychiatric admission  Management per medical team  Mixed hyperlipidemia  Management per medical team  Opioid-induced constipation  Management per medical team  Mood insomnia (HCC)  Increase trazodone to 300 mg nightly for insomnia and as an adjunct for mood    PRN medications over the past 24 hours:    Voltaren, Atarax 100 mg, ibuprofen, Robaxin, morphine, propranolol 5 mg    Progress toward goals:    Unchanged, continued severe anxiety, increasing trazodone to help with mood, potential discharge next week to rehab facility pending improvement in symptoms, continues to require inpatient psychiatric hospitalization    Discharge disposition:   201, discharge to rehab  "pending improvement in symptoms    Recommended Treatment:      - Encourage early mobility and having a structured day  - Provide frequent re-orientation, and cognitive stimulation  - Ensure assistive devices are in proper working order (eye-glasses, hearing aids)  - Encourage adequate hydration, nutrition and monitor bowel movements  - Maintain sleep-wake cycle: Uninterrupted sleep time; low-level lighting at night  - Fall precaution  - Encourage group therapy, milieu therapy, and occupational therapy  - Behavioral Health checks every 15 minutes  - Medical management per SLIM    Risks, benefits and possible side effects of Medications:   Risks, benefits, and possible side effects of medications explained to patient and patient verbalizes understanding.            SUBJECTIVE:  The patient was evaluated today for continuity of care and no acute distress noted throughout the evaluation.  The patient the patient was visited in her room today, she was lying in bed, covered in a blanket, anxious appearing, requiring redirection.  Patient reports \"I am not able to breathe, I am having panic.\"  Patient was redirected, provided with positive affirmation and coping mechanisms.  Patient is somatically preoccupied on pain.  She is perseverative on discharge, she is asking for presurgery test work to be completed while she is on the psych unit.  Patient reports \"anxious\" mood and endorsed poor sleep, adequate appetite, low energy.  She denied suicidal or homicidal ideation, she denied auditory or visual hallucinations.  She did not elicit paranoid or delusional thoughts.  We discussed increasing trazodone for improved control of insomnia and mood.  Patient then also asked for Atarax to be scheduled.  Patient also requesting benzodiazepine.  Again provided patient with redirection, discussed with patient that we will only increase 1 medication at a time.  Patient was in agreement with the plan.      Psychiatric Review of " Systems:  Behavior over the last 24 hours:  unchanged  Sleep: decreased  Appetite: normal  Medication side effects: No   ROS: no complaints    OBJECTIVE:  Current Medications:  Current Facility-Administered Medications   Medication Dose Route Frequency Provider Last Rate    acetaminophen  650 mg Oral Q4H PRN MERCY Sanchez      acetaminophen  650 mg Oral Q4H PRN MERCY Sanchez      aluminum-magnesium hydroxide-simethicone  30 mL Oral Q4H PRN MERCY Sanchez      amLODIPine  5 mg Oral Daily MERCY Kim      ARIPiprazole  10 mg Oral Daily Sagar Bucca, DO      aspirin  81 mg Oral Daily Anika Harris, MERCY      atorvastatin  40 mg Oral Daily With Dinner MERCY Kim      benztropine  1 mg Intramuscular Q4H PRN Max 6/day MERCY Sanchez      benztropine  0.5 mg Oral Q4H PRN Max 6/day MERCY Sanchez      bisacodyl  10 mg Rectal Daily PRN MECRY Sanchez      busPIRone  15 mg Oral TID Oleksandr Massey MD      Cholecalciferol  1,000 Units Oral Daily MERCY Kim      cyclobenzaprine  10 mg Oral TID PRN MERCY Kim      Diclofenac Sodium  2 g Topical 4x Daily PRN MERCY Cisneros      hydrOXYzine HCL  100 mg Oral Q6H PRN Oleksandr Massey MD      hydrOXYzine HCL  25 mg Oral Q6H PRN Max 4/day MERCY Sacnhez      hydrOXYzine HCL  50 mg Oral Q6H PRN Max 4/day MERCY Sanchez      ibuprofen  600 mg Oral Q8H PRN MERCY Kim      loratadine  10 mg Oral Daily Anika Harris, MERCY      LORazepam  1 mg Intramuscular Q6H PRN Max 3/day MERCY Sanchez      lubiprostone  8 mcg Oral BID Anika Harrsi, MERCY      magnesium Oxide  400 mg Oral Daily Anika Harris, MERCY      methocarbamol  500 mg Oral Q6H PRN MERCY Kim      morphine  15 mg Oral Q6H PRN MERCY Sanchez      OLANZapine  5 mg Intramuscular Q3H PRN Max 3/day MERCY Sanchez       OLANZapine  2.5 mg Oral Q4H PRN Max 6/day MERCY Sanchez      OLANZapine  5 mg Oral Q4H PRN Max 3/day MERCY Sanchez      OLANZapine  5 mg Oral Q3H PRN Max 3/day MERCY Sanchez      pantoprazole  40 mg Oral Early Morning Anikajessenia CoxMERCY Daniels      polyethylene glycol  17 g Oral Daily PRN RenaeMERCY De Dios      polyethylene glycol  17 g Oral Daily Anika MERCY Lynn      prazosin  2 mg Oral HS Sagar Bucca, DO      pregabalin  150 mg Oral TID Oleksandr Massey MD      propranolol  5 mg Oral Q8H PRN Sagar Bucca, DO      senna-docusate sodium  1 tablet Oral Daily PRN MERCY Sanchez      sertraline  200 mg Oral HS Oleksandr Massey MD      traZODone  300 mg Oral HS Sagar Bucca, DO      Valbenazine Tosylate  60 mg Oral Daily MERCY Kim         Vital signs in last 24 hours:  Temp:  [97 °F (36.1 °C)-97.6 °F (36.4 °C)] 97.3 °F (36.3 °C)  HR:  [72-84] 84  BP: ()/(57-88) 131/80  Resp:  [18-19] 18  SpO2:  [93 %-96 %] 96 %  O2 Device: None (Room air)    Laboratory results:  I have personally reviewed all pertinent laboratory/tests results.  Most Recent Labs:   Lab Results   Component Value Date    WBC 4.63 11/19/2024    RBC 4.45 11/19/2024    HGB 12.9 11/19/2024    HCT 39.7 11/19/2024     11/19/2024    RDW 13.2 11/19/2024    TOTANEUTABS 0.83 (L) 05/25/2023    NEUTROABS 2.34 11/19/2024    SODIUM 139 11/21/2024    K 4.0 11/21/2024     11/21/2024    CO2 28 11/21/2024    BUN 14 11/21/2024    CREATININE 0.68 11/21/2024    GLUC 94 11/21/2024    GLUF 94 11/21/2024    CALCIUM 8.3 (L) 11/21/2024    AST 17 11/19/2024    ALT 17 11/19/2024    ALKPHOS 79 11/19/2024    TP 6.2 (L) 11/19/2024    ALB 3.8 11/19/2024    TBILI 0.73 11/19/2024    CHOLESTEROL 99 11/19/2024    HDL 49 (L) 11/19/2024    TRIG 40 11/19/2024    LDLCALC 42 11/19/2024    NONHDLC 50 11/19/2024    LITHIUM <0.1 (L) 03/23/2023    AMMONIA <10 (L) 06/27/2021    UOM1BFTFTBMA 1.759  11/16/2024    FREET4 0.80 02/13/2020    PREGSERUM Negative 02/22/2014    RPR Non-Reactive 06/29/2021    HGBA1C 4.8 11/18/2024    EAG 91 11/18/2024             Mental Status Exam:  Appearance:  casually dressed, marginal hygiene, in bed, covered in a blanket   Behavior:  restless, responds to redirection   Speech:  increased rate, articulation error   Mood:  anxious   Affect:  anxious, constricted   Thought Process:  perseverative   Associations: perseveration   Thought Content:  somatic preoccupation, negative thinking, ruminating thoughts   Perceptual Disturbances: denies auditory hallucinations when asked, does not appear responding to internal stimuli   Risk Potential: Suicidal ideation - None at present  Homicidal ideation - None at present  Potential for aggression - Not at present   Sensorium:  oriented to person, place, and time/date   Memory:  recent and remote memory grossly intact   Consciousness:  alert and awake   Attention/Concentration: decreased concentration and decreased attention span   Insight:  limited   Judgment: limited   Gait/Station: uses walker   Motor Activity: no abnormal movements         Sagar Jones DO  Attending Psychiatrist   [facility]    This note was not shared with the patient due to reasonable likelihood of causing patient harm    This note was completed in part utilizing Dragon dictation Software. Grammatical, translation, syntax errors, random word insertions, spelling mistakes, and incomplete sentences may be an occasional consequence of this system secondary to software limitations with voice recognition, ambient noise, and hardware issues. If you have any questions or concerns about the content, text, or information contained within the body of this dictation, please contact the provider for clarification.

## 2024-11-29 NOTE — PLAN OF CARE
Pt. Remained in bed not attending any of 3 offered groups.  Problem: Ineffective Coping  Goal: Participates in unit activities  Description: Interventions:  - Provide therapeutic environment   - Provide required programming   - Redirect inappropriate behaviors   Outcome: Not Progressing

## 2024-11-30 PROCEDURE — 99232 SBSQ HOSP IP/OBS MODERATE 35: CPT | Performed by: PSYCHIATRY & NEUROLOGY

## 2024-11-30 RX ADMIN — VALBENAZINE 60 MG: 60 CAPSULE ORAL at 08:56

## 2024-11-30 RX ADMIN — ASPIRIN 81 MG: 81 TABLET, COATED ORAL at 08:48

## 2024-11-30 RX ADMIN — PRAZOSIN HYDROCHLORIDE 2 MG: 1 CAPSULE ORAL at 21:20

## 2024-11-30 RX ADMIN — PREGABALIN 150 MG: 100 CAPSULE ORAL at 21:20

## 2024-11-30 RX ADMIN — BUSPIRONE HYDROCHLORIDE 15 MG: 15 TABLET ORAL at 16:23

## 2024-11-30 RX ADMIN — HYDROXYZINE HYDROCHLORIDE 25 MG: 25 TABLET ORAL at 17:23

## 2024-11-30 RX ADMIN — LUBIPROSTONE 8 MCG: 8 CAPSULE, GELATIN COATED ORAL at 21:20

## 2024-11-30 RX ADMIN — ALUMINUM HYDROXIDE, MAGNESIUM HYDROXIDE, AND DIMETHICONE 30 ML: 200; 20; 200 SUSPENSION ORAL at 08:58

## 2024-11-30 RX ADMIN — POLYETHYLENE GLYCOL 3350 17 G: 17 POWDER, FOR SOLUTION ORAL at 08:51

## 2024-11-30 RX ADMIN — SERTRALINE HYDROCHLORIDE 200 MG: 100 TABLET ORAL at 21:20

## 2024-11-30 RX ADMIN — BUSPIRONE HYDROCHLORIDE 15 MG: 15 TABLET ORAL at 08:48

## 2024-11-30 RX ADMIN — PANTOPRAZOLE SODIUM 40 MG: 40 TABLET, DELAYED RELEASE ORAL at 06:32

## 2024-11-30 RX ADMIN — ARIPIPRAZOLE 10 MG: 10 TABLET ORAL at 08:48

## 2024-11-30 RX ADMIN — Medication 1000 UNITS: at 08:48

## 2024-11-30 RX ADMIN — PROPRANOLOL HYDROCHLORIDE 5 MG: 10 TABLET ORAL at 19:20

## 2024-11-30 RX ADMIN — LORATADINE 10 MG: 10 TABLET ORAL at 08:48

## 2024-11-30 RX ADMIN — TRAZODONE HYDROCHLORIDE 300 MG: 100 TABLET ORAL at 21:20

## 2024-11-30 RX ADMIN — BUSPIRONE HYDROCHLORIDE 15 MG: 15 TABLET ORAL at 21:20

## 2024-11-30 RX ADMIN — AMLODIPINE BESYLATE 5 MG: 5 TABLET ORAL at 08:48

## 2024-11-30 RX ADMIN — PREGABALIN 150 MG: 100 CAPSULE ORAL at 08:48

## 2024-11-30 RX ADMIN — PREGABALIN 150 MG: 100 CAPSULE ORAL at 16:23

## 2024-11-30 RX ADMIN — Medication 400 MG: at 08:48

## 2024-11-30 RX ADMIN — MORPHINE SULFATE 15 MG: 15 TABLET ORAL at 21:20

## 2024-11-30 RX ADMIN — SENNOSIDES AND DOCUSATE SODIUM 1 TABLET: 50; 8.6 TABLET ORAL at 17:23

## 2024-11-30 RX ADMIN — LUBIPROSTONE 8 MCG: 8 CAPSULE, GELATIN COATED ORAL at 08:48

## 2024-11-30 RX ADMIN — ATORVASTATIN CALCIUM 40 MG: 40 TABLET, FILM COATED ORAL at 16:23

## 2024-11-30 NOTE — NURSING NOTE
The patient was received at 7:00 AM. She has been intermittently visible on the unit and was observed showering. The patient reported ongoing anxiety that is only partially relieved by medication. She received her scheduled medications and has been compliant with both medications and meals. The patient denies suicidal ideation (SI), homicidal ideation (HI), and auditory or visual hallucinations.

## 2024-11-30 NOTE — TREATMENT TEAM
11/29/24 8218   Pain Assessment   Pain Assessment Tool 0-10   Pain Score 10 - Worst Possible Pain   Pain Location/Orientation Location: Back   Pain Onset/Description Onset: Ongoing   Effect of Pain on Daily Activities mood   Patient's Stated Pain Goal No pain   Hospital Pain Intervention(s) Medication (See MAR)     Administered 600 mg ibuprofen for patient c/o back pain rated 10 on 0-10 pain scale.

## 2024-11-30 NOTE — TREATMENT TEAM
11/30/24 1720   Allen Anxiety Scale   Anxious Mood 2   Tension 1   Fears 0   Insomnia 0   Intellectual 1   Depressed Mood 2   Somatic Complaints: Muscular 0   Somatic Complaints: Sensory 0   Cardiovascular Symptoms 0   Respiratory Symptoms 0   Gastrointestinal Symptoms 0   Genitourinary Symptoms 0   Autonomic Symptoms 0   Behavior at Interview 2   Allen Anxiety Score 8     Patient reports feeling anxious. PRN atarax 25 mg given PO.

## 2024-11-30 NOTE — TREATMENT TEAM
Patient reports ibuprofen 600 mg was minimally effective for pain. Rates pain 7 on a 0-10 pain scale.    no joint pain/no calf pain

## 2024-11-30 NOTE — TREATMENT TEAM
11/29/24 1923   Pain Assessment Post Intervention   Pain Assessment Tool Used: 0-10   Post Intervention Pain Score 6   Post Intervention Pain Location/Orientation Location: Back   Response to Interventions minimally effective     Patient reports that 15 mg of morphine was effective for back pain. Rated pain 6 on 0-10 pain scale.

## 2024-11-30 NOTE — PLAN OF CARE
Problem: Ineffective Coping  Goal: Cooperates with admission process  Description: Interventions:   - Complete admission process  Outcome: Progressing  Goal: Identifies ineffective coping skills  Outcome: Progressing  Goal: Identifies healthy coping skills  Outcome: Progressing  Goal: Demonstrates healthy coping skills  Outcome: Progressing  Goal: Patient/Family participate in treatment and DC plans  Description: Interventions:  - Provide therapeutic environment  Outcome: Progressing  Goal: Patient/Family verbalizes awareness of resources  Outcome: Progressing  Goal: Understands least restrictive measures  Description: Interventions:  - Utilize least restrictive behavior  Outcome: Progressing  Goal: Free from restraint events  Description: - Utilize least restrictive measures   - Provide behavioral interventions   - Redirect inappropriate behaviors   Outcome: Progressing     Problem: Depression  Goal: Treatment Goal: Demonstrate behavioral control of depressive symptoms, verbalize feelings of improved mood/affect, and adopt new coping skills prior to discharge  Outcome: Progressing  Goal: Verbalize thoughts and feelings  Description: Interventions:  - Assess and re-assess patient's level of risk   - Engage patient in 1:1 interactions, daily, for a minimum of 15 minutes   - Encourage patient to express feelings, fears, frustrations, hopes   Outcome: Progressing  Goal: Refrain from harming self  Description: Interventions:  - Monitor patient closely, per order   - Supervise medication ingestion, monitor effects and side effects   Outcome: Progressing  Goal: Refrain from isolation  Description: Interventions:  - Develop a trusting relationship   - Encourage socialization   Outcome: Progressing  Goal: Refrain from self-neglect  Outcome: Progressing  Goal: Attend and participate in unit activities, including therapeutic, recreational, and educational groups  Description: Interventions:  - Provide therapeutic and  educational activities daily, encourage attendance and participation, and document same in the medical record   Outcome: Progressing  Goal: Complete daily ADLs, including personal hygiene independently, as able  Description: Interventions:  - Observe, teach, and assist patient with ADLS  -  Monitor and promote a balance of rest/activity, with adequate nutrition and elimination   Outcome: Progressing     Problem: Anxiety  Goal: Anxiety is at manageable level  Description: Interventions:  - Assess and monitor patient's anxiety level.   - Monitor for signs and symptoms (heart palpitations, chest pain, shortness of breath, headaches, nausea, feeling jumpy, restlessness, irritable, apprehensive).   - Collaborate with interdisciplinary team and initiate plan and interventions as ordered.  - [place] patient to unit/surroundings  - Explain treatment plan  - Encourage participation in care  - Encourage verbalization of concerns/fears  - Identify coping mechanisms  - Assist in developing anxiety-reducing skills  - Administer/offer alternative therapies  - Limit or eliminate stimulants  Outcome: Progressing     Problem: DISCHARGE PLANNING - CARE MANAGEMENT  Goal: Discharge to post-acute care or home with appropriate resources  Description: INTERVENTIONS:  - Conduct assessment to determine patient/family and health care team treatment goals, and need for post-acute services based on payer coverage, community resources, and patient preferences, and barriers to discharge  - Address psychosocial, clinical, and financial barriers to discharge as identified in assessment in conjunction with the patient/family and health care team  - Arrange appropriate level of post-acute services according to patient’s   needs and preference and payer coverage in collaboration with the physician and health care team  - Communicate with and update the patient/family, physician, and health care team regarding progress on the discharge plan  - Arrange  appropriate transportation to post-acute venues  Outcome: Progressing     Problem: Potential for Falls  Goal: Patient will remain free of falls  Description: INTERVENTIONS:  - Educate patient/family on patient safety including physical limitations  - Instruct patient to call for assistance with activity   - Consult OT/PT to assist with strengthening/mobility   - Keep Call bell within reach  - Keep bed low and locked with side rails adjusted as appropriate  - Keep care items and personal belongings within reach  - Initiate and maintain comfort rounds  - Make Fall Risk Sign visible to staff  - Offer Toileting every q2 Hours, in advance of need  - Initiate/Maintain bed alarm  - Obtain necessary fall risk management equipment: walker  - Apply yellow socks and bracelet for high fall risk patients  - Consider moving patient to room near nurses station  Outcome: Progressing     Problem: Prexisting or High Potential for Compromised Skin Integrity  Goal: Skin integrity is maintained or improved  Description: INTERVENTIONS:  - Identify patients at risk for skin breakdown  - Assess and monitor skin integrity  - Assess and monitor nutrition and hydration status  - Monitor labs   - Assess for incontinence   - Turn and reposition patient  - Assist with mobility/ambulation  - Relieve pressure over bony prominences  - Avoid friction and shearing  - Provide appropriate hygiene as needed including keeping skin clean and dry  - Evaluate need for skin moisturizer/barrier cream  - Collaborate with interdisciplinary team   - Patient/family teaching  - Consider wound care consult   Outcome: Progressing

## 2024-11-30 NOTE — ASSESSMENT & PLAN NOTE
Continue Zoloft 200 mg at bedtime for ongoing symptoms of depression and anxiety  Continue Lyrica to 150 mg three times daily for anxiety and chronic pain  Continue BuSpar to 15 mg three times daily for generalized anxiety  Continue trazodone to 300 mg nightly for insomnia and as an adjunct for mood  Avoid benzodiazepine use in the setting of current morphine use

## 2024-11-30 NOTE — PROGRESS NOTES
Progress Note - Behavioral Health   Name: Samantha Rodriguez 58 y.o. female I MRN: [id number]  Unit/Bed#: [facility] 606[facility]01 I Date of Admission: 11/16/2024   Date of Service: 11/30/2024 I Hospital Day: 12    Assessment & Plan  Severe episode of recurrent major depressive disorder, without psychotic features (HCC)  Continue Zoloft 200 mg at bedtime for ongoing symptoms of depression and anxiety  Continue Abilify 10 mg daily for antidepressant augmentation (increased 11/23/2024)    Generalized anxiety disorder with panic attacks  Continue Zoloft 200 mg at bedtime for ongoing symptoms of depression and anxiety  Continue Lyrica to 150 mg three times daily for anxiety and chronic pain  Continue BuSpar to 15 mg three times daily for generalized anxiety  Continue trazodone to 300 mg nightly for insomnia and as an adjunct for mood  Avoid benzodiazepine use in the setting of current morphine use  PTSD (post-traumatic stress disorder)  Continue prazosin 2 mg at night for nightmares  Continue ongoing medication management   Lumbar spondylosis  Pain management per medical team  Obesity, morbid (HCC)  Management per medical team  Tardive dyskinesia  Continue Ingrezza 60 mg daily  History of CVA (cerebrovascular accident)  Management per medical team  Medical clearance for psychiatric admission  Management per medical team  Mixed hyperlipidemia  Management per medical team  Opioid-induced constipation  Management per medical team  Mood insomnia (HCC)  Increase trazodone to 300 mg nightly for insomnia and as an adjunct for mood    Progress Toward Goals: Improving    Recommended Treatment: Continue with group therapy, milieu therapy and occupational therapy.      Risks, benefits and possible side effects of Medications:   Risks, benefits, and possible side effects of medications explained to patient and patient verbalizes understanding.      History of Present Illness   Behavior over the last 24 hours:  improved  Sleep:  Improving  Appetite: normal  Medication side effects: No  ROS: no complaints    Subjective: Patient chart reviewed and case discussed with the nurse.  The patient was seen in the milieu today.  She reports she feels much better today.  Reports her sleep significantly improved on increase in trazodone yesterday.  Denies feeling depressed today.  Reports anxiety and rates it at 5 on a scale of 0-10 with 10 being the worst.  Denies any SI or HI.  Reports appetite has been good.  Energy level and concentration has been better.  Denies any auditory or visual hallucination.  Does not endorse any paranoia or delusional ideation.  Reports compliant with psychiatric medications and denies any side effect.    As per the nurse the patient has been intermittently visible in the unit today.  Took shower today.  Has some anxiety which improves partially on medications.  Compliant with medication and meals.  Denies any SI or HI.    Objective   Mental Status Evaluation:  Appearance:  age appropriate   Behavior:  normal   Speech:  normal pitch and normal volume   Mood:  normal, does report anxiety   Affect:  normal and brighter   Thought Process:  normal   Associations: intact associations   Thought Content:  normal   Perceptual Disturbances: None   Risk Potential: Suicidal Ideations none  Homicidal Ideations none  Potential for Aggression No   Sensorium:  person, place, and time/date   Memory:  recent and remote memory grossly intact   Consciousness:  alert and awake    Attention: attention span and concentration were age appropriate   Insight:  fair   Judgment: fair   Gait/Station: In chair   Motor Activity: no abnormal movements     Medications: all current active meds have been reviewed.      Lab Results: I have reviewed the following results:  Last Laboratory Results with Depakote and/or Tegretol levels:   Lab Results   Component Value Date    WBC 4.63 11/19/2024    RBC 4.45 11/19/2024    HGB 12.9 11/19/2024    HCT 39.7  11/19/2024     11/19/2024    RDW 13.2 11/19/2024    NEUTROABS 2.34 11/19/2024    SODIUM 139 11/21/2024    K 4.0 11/21/2024     11/21/2024    CO2 28 11/21/2024    BUN 14 11/21/2024    CREATININE 0.68 11/21/2024    GLUC 94 11/21/2024    GLUF 94 11/21/2024    CALCIUM 8.3 (L) 11/21/2024    AST 17 11/19/2024    ALT 17 11/19/2024    ALKPHOS 79 11/19/2024    TP 6.2 (L) 11/19/2024    ALB 3.8 11/19/2024    TBILI 0.73 11/19/2024

## 2024-12-01 PROCEDURE — 99232 SBSQ HOSP IP/OBS MODERATE 35: CPT | Performed by: PSYCHIATRY & NEUROLOGY

## 2024-12-01 RX ADMIN — PREGABALIN 150 MG: 100 CAPSULE ORAL at 21:09

## 2024-12-01 RX ADMIN — SERTRALINE HYDROCHLORIDE 200 MG: 100 TABLET ORAL at 21:09

## 2024-12-01 RX ADMIN — ARIPIPRAZOLE 10 MG: 10 TABLET ORAL at 08:47

## 2024-12-01 RX ADMIN — VALBENAZINE 60 MG: 60 CAPSULE ORAL at 09:18

## 2024-12-01 RX ADMIN — LUBIPROSTONE 8 MCG: 8 CAPSULE, GELATIN COATED ORAL at 21:09

## 2024-12-01 RX ADMIN — BUSPIRONE HYDROCHLORIDE 15 MG: 15 TABLET ORAL at 16:07

## 2024-12-01 RX ADMIN — BUSPIRONE HYDROCHLORIDE 15 MG: 15 TABLET ORAL at 21:09

## 2024-12-01 RX ADMIN — PREGABALIN 150 MG: 100 CAPSULE ORAL at 16:07

## 2024-12-01 RX ADMIN — TRAZODONE HYDROCHLORIDE 300 MG: 100 TABLET ORAL at 21:09

## 2024-12-01 RX ADMIN — ATORVASTATIN CALCIUM 40 MG: 40 TABLET, FILM COATED ORAL at 16:07

## 2024-12-01 RX ADMIN — ALUMINUM HYDROXIDE, MAGNESIUM HYDROXIDE, AND DIMETHICONE 30 ML: 200; 20; 200 SUSPENSION ORAL at 21:39

## 2024-12-01 RX ADMIN — BISACODYL 10 MG: 10 SUPPOSITORY RECTAL at 18:32

## 2024-12-01 RX ADMIN — Medication 400 MG: at 08:48

## 2024-12-01 RX ADMIN — LORATADINE 10 MG: 10 TABLET ORAL at 08:48

## 2024-12-01 RX ADMIN — ASPIRIN 81 MG: 81 TABLET, COATED ORAL at 08:48

## 2024-12-01 RX ADMIN — POLYETHYLENE GLYCOL 3350 17 G: 17 POWDER, FOR SOLUTION ORAL at 09:18

## 2024-12-01 RX ADMIN — MORPHINE SULFATE 15 MG: 15 TABLET ORAL at 06:17

## 2024-12-01 RX ADMIN — Medication 1000 UNITS: at 08:48

## 2024-12-01 RX ADMIN — PREGABALIN 150 MG: 100 CAPSULE ORAL at 08:47

## 2024-12-01 RX ADMIN — PANTOPRAZOLE SODIUM 40 MG: 40 TABLET, DELAYED RELEASE ORAL at 05:40

## 2024-12-01 RX ADMIN — PRAZOSIN HYDROCHLORIDE 2 MG: 1 CAPSULE ORAL at 21:09

## 2024-12-01 RX ADMIN — BUSPIRONE HYDROCHLORIDE 15 MG: 15 TABLET ORAL at 08:48

## 2024-12-01 RX ADMIN — AMLODIPINE BESYLATE 5 MG: 5 TABLET ORAL at 08:48

## 2024-12-01 RX ADMIN — HYDROXYZINE HYDROCHLORIDE 100 MG: 50 TABLET, FILM COATED ORAL at 09:17

## 2024-12-01 RX ADMIN — LUBIPROSTONE 8 MCG: 8 CAPSULE, GELATIN COATED ORAL at 08:47

## 2024-12-01 NOTE — NURSING NOTE
Patient is alert and visible in the milieu for meals and snacks. Withdrawn to room otherwise. Denies all psych s/s. Medication compliant and cooperative with care. Safety precautions maintained.

## 2024-12-01 NOTE — NURSING NOTE
Patient reports severe generalized pain and was offered 650mg Tylenol per order and patient refused indicating she wants Morphine.  She was given 15mg morphine IR @ 0617.

## 2024-12-01 NOTE — PROGRESS NOTES
Progress Note - Behavioral Health   Name: Samantha Rodriguez 58 y.o. female I MRN: [id number]  Unit/Bed#: [facility] 606-01 I Date of Admission: 11/16/2024   Date of Service: 12/1/2024 I Hospital Day: 13    Assessment & Plan  Severe episode of recurrent major depressive disorder, without psychotic features (HCC)  Continue Zoloft 200 mg at bedtime for ongoing symptoms of depression and anxiety  Continue Abilify 10 mg daily for antidepressant augmentation (increased 11/23/2024)    Generalized anxiety disorder with panic attacks  Continue Zoloft 200 mg at bedtime for ongoing symptoms of depression and anxiety  Continue Lyrica to 150 mg three times daily for anxiety and chronic pain  Continue BuSpar to 15 mg three times daily for generalized anxiety  Continue trazodone to 300 mg nightly for insomnia and as an adjunct for mood  Avoid benzodiazepine use in the setting of current morphine use  PTSD (post-traumatic stress disorder)  Continue prazosin 2 mg at night for nightmares  Continue ongoing medication management   Lumbar spondylosis  Pain management per medical team  Obesity, morbid (HCC)  Management per medical team  Tardive dyskinesia  Continue Ingrezza 60 mg daily  History of CVA (cerebrovascular accident)  Management per medical team  Medical clearance for psychiatric admission  Management per medical team  Mixed hyperlipidemia  Management per medical team  Opioid-induced constipation  Management per medical team  Mood insomnia (HCC)  Increase trazodone to 300 mg nightly for insomnia and as an adjunct for mood    Progress Toward Goals: Improving    Recommended Treatment: Continue with group therapy, milieu therapy and occupational therapy.      Risks, benefits and possible side effects of Medications:   Risks, benefits, and possible side effects of medications explained to patient and patient verbalizes understanding.      History of Present Illness   Behavior over the last 24 hours:  improved  Sleep:  Improving  Appetite: normal  Medication side effects: No  ROS: no complaints    Subjective: Patient chart reviewed and case discussed with the nurse.  The patient was seen in the milieu today.  She continues to report feeling better.  Reports her sleep has been much better with trazodone.  Reports depression and anxiety being present though has been better. Rates it at 5 on a scale of 0-10 with 10 being the worst.  Denies any SI or HI.  Reports appetite has been good.  Energy level and concentration has been better.  Denies any auditory or visual hallucination.  Does not endorse any paranoia or delusional ideation.  Reports compliant with psychiatric medications and denies any side effect.    As per the nurse the patient complained of generalized pain and was offered Tylenol but the patient declined.  She requested morphine and was given 15 mg IR.  She later in the  morning complained of anxiety and was given Atarax 100 mg which was effective.  Denied any other concerns today.  Objective   Mental Status Evaluation:  Appearance:  age appropriate   Behavior:  normal   Speech:  normal pitch and normal volume   Mood:  Depression and anxiety   Affect:  Mildly constricted   Thought Process:  normal   Associations: intact associations   Thought Content:  normal   Perceptual Disturbances: None   Risk Potential: Suicidal Ideations none  Homicidal Ideations none  Potential for Aggression No   Sensorium:  person, place, and time/date   Memory:  recent and remote memory grossly intact   Consciousness:  alert and awake    Attention: attention span and concentration were age appropriate   Insight:  fair   Judgment: fair   Gait/Station: In chair   Motor Activity: no abnormal movements     Medications: all current active meds have been reviewed.      Lab Results: I have reviewed the following results:  Last Laboratory Results with Depakote and/or Tegretol levels:   Lab Results   Component Value Date    WBC 4.63 11/19/2024    RBC  4.45 11/19/2024    HGB 12.9 11/19/2024    HCT 39.7 11/19/2024     11/19/2024    RDW 13.2 11/19/2024    NEUTROABS 2.34 11/19/2024    SODIUM 139 11/21/2024    K 4.0 11/21/2024     11/21/2024    CO2 28 11/21/2024    BUN 14 11/21/2024    CREATININE 0.68 11/21/2024    GLUC 94 11/21/2024    GLUF 94 11/21/2024    CALCIUM 8.3 (L) 11/21/2024    AST 17 11/19/2024    ALT 17 11/19/2024    ALKPHOS 79 11/19/2024    TP 6.2 (L) 11/19/2024    ALB 3.8 11/19/2024    TBILI 0.73 11/19/2024

## 2024-12-01 NOTE — NURSING NOTE
Pt cooperative after increased anxiety this morning. Was able to sleep and woke up in a better mood. Present in milieu in afternoon interacting with peers appropriately. Medication and meal compliant. Denies SI/HI. Q 15 minute checks maintained.

## 2024-12-01 NOTE — TREATMENT TEAM
12/01/24 0919   Allen Anxiety Scale   Anxious Mood 3   Tension 3   Fears 3   Insomnia 0   Intellectual 2   Depressed Mood 3   Somatic Complaints: Muscular 0   Somatic Complaints: Sensory 0   Cardiovascular Symptoms 0   Respiratory Symptoms 0   Gastrointestinal Symptoms 3   Genitourinary Symptoms 0   Autonomic Symptoms 3   Behavior at Interview 4   Allen Anxiety Score 24     PRN Atarax 100 mg given for increased anxiety patient having a difficult time this morning and was unable to control her anger towards staff related to a cup of coffee.

## 2024-12-02 VITALS
RESPIRATION RATE: 18 BRPM | HEIGHT: 61 IN | HEART RATE: 89 BPM | SYSTOLIC BLOOD PRESSURE: 158 MMHG | DIASTOLIC BLOOD PRESSURE: 88 MMHG | TEMPERATURE: 97.2 F | BODY MASS INDEX: 40.29 KG/M2 | OXYGEN SATURATION: 98 % | WEIGHT: 213.4 LBS

## 2024-12-02 PROBLEM — Z00.8 MEDICAL CLEARANCE FOR PSYCHIATRIC ADMISSION: Status: RESOLVED | Noted: 2021-07-16 | Resolved: 2024-12-02

## 2024-12-02 PROCEDURE — 99238 HOSP IP/OBS DSCHRG MGMT 30/<: CPT | Performed by: PSYCHIATRY & NEUROLOGY

## 2024-12-02 RX ORDER — CYCLOBENZAPRINE HCL 10 MG
10 TABLET ORAL 3 TIMES DAILY PRN
Start: 2024-12-02

## 2024-12-02 RX ORDER — ARIPIPRAZOLE 10 MG/1
10 TABLET ORAL DAILY
Qty: 30 TABLET | Refills: 0 | Status: SHIPPED | OUTPATIENT
Start: 2024-12-03

## 2024-12-02 RX ORDER — ACETAMINOPHEN 325 MG/1
650 TABLET ORAL EVERY 4 HOURS PRN
Start: 2024-12-02

## 2024-12-02 RX ORDER — ATORVASTATIN CALCIUM 40 MG/1
40 TABLET, FILM COATED ORAL
Qty: 30 TABLET | Refills: 0 | Status: SHIPPED | OUTPATIENT
Start: 2024-12-02

## 2024-12-02 RX ORDER — LUBIPROSTONE 8 UG/1
8 CAPSULE ORAL 2 TIMES DAILY
Qty: 60 CAPSULE | Refills: 0 | Status: SHIPPED | OUTPATIENT
Start: 2024-12-02

## 2024-12-02 RX ORDER — TRAZODONE HYDROCHLORIDE 300 MG/1
300 TABLET ORAL
Qty: 30 TABLET | Refills: 0 | Status: SHIPPED | OUTPATIENT
Start: 2024-12-02

## 2024-12-02 RX ORDER — MORPHINE SULFATE 15 MG/1
15 TABLET ORAL 2 TIMES DAILY
Qty: 60 TABLET | Refills: 0 | Status: SHIPPED | OUTPATIENT
Start: 2024-12-02 | End: 2025-01-01

## 2024-12-02 RX ORDER — SERTRALINE HYDROCHLORIDE 100 MG/1
200 TABLET, FILM COATED ORAL
Qty: 60 TABLET | Refills: 0 | Status: SHIPPED | OUTPATIENT
Start: 2024-12-02

## 2024-12-02 RX ORDER — HYDROXYZINE HYDROCHLORIDE 25 MG/1
25 TABLET, FILM COATED ORAL 3 TIMES DAILY
Qty: 90 TABLET | Refills: 0 | Status: SHIPPED | OUTPATIENT
Start: 2024-12-02

## 2024-12-02 RX ORDER — AMLODIPINE BESYLATE 5 MG/1
5 TABLET ORAL DAILY
Qty: 30 TABLET | Refills: 0 | Status: SHIPPED | OUTPATIENT
Start: 2024-12-02 | End: 2025-03-02

## 2024-12-02 RX ORDER — PANTOPRAZOLE SODIUM 40 MG/1
40 TABLET, DELAYED RELEASE ORAL
Qty: 30 TABLET | Refills: 0 | Status: SHIPPED | OUTPATIENT
Start: 2024-12-03

## 2024-12-02 RX ORDER — PRAZOSIN HYDROCHLORIDE 2 MG/1
2 CAPSULE ORAL
Qty: 30 CAPSULE | Refills: 0 | Status: SHIPPED | OUTPATIENT
Start: 2024-12-02

## 2024-12-02 RX ORDER — ASPIRIN 81 MG/1
81 TABLET ORAL DAILY
Qty: 30 TABLET | Refills: 0 | Status: SHIPPED | OUTPATIENT
Start: 2024-12-02

## 2024-12-02 RX ORDER — VALBENAZINE 60 MG/1
1 CAPSULE ORAL DAILY
Qty: 30 CAPSULE | Refills: 0 | Status: SHIPPED | OUTPATIENT
Start: 2024-12-02

## 2024-12-02 RX ORDER — PREGABALIN 150 MG/1
150 CAPSULE ORAL 3 TIMES DAILY
Qty: 90 CAPSULE | Refills: 0 | Status: SHIPPED | OUTPATIENT
Start: 2024-12-02 | End: 2025-01-01

## 2024-12-02 RX ORDER — MAGNESIUM HYDROXIDE/ALUMINUM HYDROXICE/SIMETHICONE 120; 1200; 1200 MG/30ML; MG/30ML; MG/30ML
30 SUSPENSION ORAL DAILY
Qty: 900 ML | Refills: 0 | Status: SHIPPED | OUTPATIENT
Start: 2024-12-02

## 2024-12-02 RX ORDER — BUSPIRONE HYDROCHLORIDE 15 MG/1
15 TABLET ORAL 3 TIMES DAILY
Qty: 90 TABLET | Refills: 0 | Status: SHIPPED | OUTPATIENT
Start: 2024-12-02

## 2024-12-02 RX ORDER — AMOXICILLIN 250 MG
1 CAPSULE ORAL DAILY PRN
Start: 2024-12-02

## 2024-12-02 RX ORDER — LORATADINE 10 MG/1
10 TABLET ORAL DAILY
Qty: 30 TABLET | Refills: 0 | Status: SHIPPED | OUTPATIENT
Start: 2024-12-03

## 2024-12-02 RX ORDER — HYDROXYZINE HYDROCHLORIDE 25 MG/1
25 TABLET, FILM COATED ORAL 3 TIMES DAILY
Status: DISCONTINUED | OUTPATIENT
Start: 2024-12-02 | End: 2024-12-02 | Stop reason: HOSPADM

## 2024-12-02 RX ORDER — LANOLIN ALCOHOL/MO/W.PET/CERES
400 CREAM (GRAM) TOPICAL DAILY
Qty: 30 TABLET | Refills: 0 | Status: SHIPPED | OUTPATIENT
Start: 2024-12-03

## 2024-12-02 RX ADMIN — AMLODIPINE BESYLATE 5 MG: 5 TABLET ORAL at 08:35

## 2024-12-02 RX ADMIN — VALBENAZINE 60 MG: 60 CAPSULE ORAL at 08:36

## 2024-12-02 RX ADMIN — PANTOPRAZOLE SODIUM 40 MG: 40 TABLET, DELAYED RELEASE ORAL at 05:49

## 2024-12-02 RX ADMIN — LORATADINE 10 MG: 10 TABLET ORAL at 08:35

## 2024-12-02 RX ADMIN — ARIPIPRAZOLE 10 MG: 10 TABLET ORAL at 08:35

## 2024-12-02 RX ADMIN — Medication 400 MG: at 08:35

## 2024-12-02 RX ADMIN — ASPIRIN 81 MG: 81 TABLET, COATED ORAL at 08:35

## 2024-12-02 RX ADMIN — HYDROXYZINE HYDROCHLORIDE 100 MG: 50 TABLET, FILM COATED ORAL at 08:49

## 2024-12-02 RX ADMIN — MORPHINE SULFATE 15 MG: 15 TABLET ORAL at 08:41

## 2024-12-02 RX ADMIN — LUBIPROSTONE 8 MCG: 8 CAPSULE, GELATIN COATED ORAL at 08:35

## 2024-12-02 RX ADMIN — PREGABALIN 150 MG: 100 CAPSULE ORAL at 08:34

## 2024-12-02 RX ADMIN — BUSPIRONE HYDROCHLORIDE 15 MG: 15 TABLET ORAL at 08:35

## 2024-12-02 NOTE — DISCHARGE INSTR - APPOINTMENTS
Behavioral Health Nurse Navigator, Fiordaliza or Senait will be calling you after your discharge, on the phone number that you provided.  They will be available as an additional support, if needed.   If you wish to speak with Fiordaliza, you may contact her at [phone].

## 2024-12-02 NOTE — ASSESSMENT & PLAN NOTE
Continue Zoloft 200 mg at bedtime for ongoing symptoms of depression and anxiety  Continue Lyrica to 150 mg three times daily for anxiety and chronic pain  Continue BuSpar to 15 mg three times daily for generalized anxiety  Continue trazodone to 300 mg nightly for insomnia and as an adjunct for mood

## 2024-12-02 NOTE — ASSESSMENT & PLAN NOTE
Aware of the need to follow up outpatient with family physician and specialists for medical issues

## 2024-12-02 NOTE — ASSESSMENT & PLAN NOTE
Continue Zoloft 200 mg at bedtime for ongoing symptoms of depression and anxiety  Continue Abilify 10 mg daily for antidepressant augmentation

## 2024-12-02 NOTE — CASE MANAGEMENT
HUNTER notified [facility] of pt's return today. Call made to [facility], spoke with Lour[facility] SIFUENTES regarding pt's d/c scripts. HUNTER informed all meds can be sent to [facility]. [facility] tel# [phone], fax# [phone].  HUNTER met with pt, reviewed d/c to [facility] today and IMM notice. Pt in agreement with d/c and return to [facility]. Pt signed IMM notice.   S transport requested through [facility]. Transport accepted by [facility] with 1400 . CMN form completed.  CM notified [facility], pt and RN of d/c time.  Call made to pt's son Mick; missy notifying of d/c and time.
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
## 2024-12-02 NOTE — TREATMENT TEAM
12/02/24 0909   Team Meeting   Meeting Type Daily Rounds   Initial Conference Date 12/02/24   Team Members Present   Team Members Present Physician;Nurse;;Occupational Therapist   Physician Team Member Dr Jones, Dr Palafox, Dr Massey, Renae MADRID   Nursing Team Member Martha   Care Management Team Member Lory MERCADO Team Member Tyler   Patient/Family Present   Patient Present No   Patient's Family Present No     Eating well, PRN Atarax 0900 with good effect due to anxiety, demanding, PRN Atarax this AM. Slept. Plan d/c today.

## 2024-12-02 NOTE — CMS CERTIFICATION NOTE
Recertification: Based upon physical, mental and social evaluations, I certify that inpatient psychiatric services continue to be medically necessary for this patient for a duration of 12 midnights for the treatment of  Severe episode of recurrent major depressive disorder, without psychotic features (HCC) Available alternative community resources still do not meet the patient's mental health care needs. I further attest that an established written individualized plan of care has been updated and is outlined in the patient's medical records.

## 2024-12-02 NOTE — DISCHARGE INSTR - OTHER ORDERS
You are being discharged to: [facility]   If you are unable to deal with your distressed mood alone please contact [facility] Crisis # [phone], dial 911 or go to the nearest emergency center.

## 2024-12-02 NOTE — PLAN OF CARE
Problem: Potential for Falls  Goal: Patient will remain free of falls  Description: INTERVENTIONS:  - Educate patient/family on patient safety including physical limitations  - Instruct patient to call for assistance with activity   - Consult OT/PT to assist with strengthening/mobility   - Keep Call bell within reach  - Keep bed low and locked with side rails adjusted as appropriate  - Keep care items and personal belongings within reach  - Initiate and maintain comfort rounds  - Make Fall Risk Sign visible to staff  - Offer Toileting every q2 Hours, in advance of need  - Initiate/Maintain bed alarm  - Obtain necessary fall risk management equipment: walker  - Apply yellow socks and bracelet for high fall risk patients  - Consider moving patient to room near nurses station  Outcome: Completed     Problem: Ineffective Coping  Goal: Cooperates with admission process  Description: Interventions:   - Complete admission process  Outcome: Completed  Goal: Identifies ineffective coping skills  Outcome: Completed  Goal: Identifies healthy coping skills  Outcome: Completed  Goal: Demonstrates healthy coping skills  Outcome: Completed  Goal: Participates in unit activities  Description: Interventions:  - Provide therapeutic environment   - Provide required programming   - Redirect inappropriate behaviors   Outcome: Completed  Goal: Patient/Family participate in treatment and DC plans  Description: Interventions:  - Provide therapeutic environment  Outcome: Completed  Goal: Patient/Family verbalizes awareness of resources  Outcome: Completed  Goal: Understands least restrictive measures  Description: Interventions:  - Utilize least restrictive behavior  Outcome: Completed  Goal: Free from restraint events  Description: - Utilize least restrictive measures   - Provide behavioral interventions   - Redirect inappropriate behaviors   Outcome: Completed     Problem: Depression  Goal: Treatment Goal: Demonstrate behavioral control  of depressive symptoms, verbalize feelings of improved mood/affect, and adopt new coping skills prior to discharge  Outcome: Completed  Goal: Verbalize thoughts and feelings  Description: Interventions:  - Assess and re-assess patient's level of risk   - Engage patient in 1:1 interactions, daily, for a minimum of 15 minutes   - Encourage patient to express feelings, fears, frustrations, hopes   Outcome: Completed  Goal: Refrain from harming self  Description: Interventions:  - Monitor patient closely, per order   - Supervise medication ingestion, monitor effects and side effects   Outcome: Completed  Goal: Refrain from isolation  Description: Interventions:  - Develop a trusting relationship   - Encourage socialization   Outcome: Completed  Goal: Refrain from self-neglect  Outcome: Completed  Goal: Attend and participate in unit activities, including therapeutic, recreational, and educational groups  Description: Interventions:  - Provide therapeutic and educational activities daily, encourage attendance and participation, and document same in the medical record   Outcome: Completed  Goal: Complete daily ADLs, including personal hygiene independently, as able  Description: Interventions:  - Observe, teach, and assist patient with ADLS  -  Monitor and promote a balance of rest/activity, with adequate nutrition and elimination   Outcome: Completed     Problem: Anxiety  Goal: Anxiety is at manageable level  Description: Interventions:  - Assess and monitor patient's anxiety level.   - Monitor for signs and symptoms (heart palpitations, chest pain, shortness of breath, headaches, nausea, feeling jumpy, restlessness, irritable, apprehensive).   - Collaborate with interdisciplinary team and initiate plan and interventions as ordered.  - [place] patient to unit/surroundings  - Explain treatment plan  - Encourage participation in care  - Encourage verbalization of concerns/fears  - Identify coping mechanisms  - Assist in  developing anxiety-reducing skills  - Administer/offer alternative therapies  - Limit or eliminate stimulants  Outcome: Completed     Problem: DISCHARGE PLANNING - CARE MANAGEMENT  Goal: Discharge to post-acute care or home with appropriate resources  Description: INTERVENTIONS:  - Conduct assessment to determine patient/family and health care team treatment goals, and need for post-acute services based on payer coverage, community resources, and patient preferences, and barriers to discharge  - Address psychosocial, clinical, and financial barriers to discharge as identified in assessment in conjunction with the patient/family and health care team  - Arrange appropriate level of post-acute services according to patient’s   needs and preference and payer coverage in collaboration with the physician and health care team  - Communicate with and update the patient/family, physician, and health care team regarding progress on the discharge plan  - Arrange appropriate transportation to post-acute venues  Outcome: Completed     Problem: Prexisting or High Potential for Compromised Skin Integrity  Goal: Skin integrity is maintained or improved  Description: INTERVENTIONS:  - Identify patients at risk for skin breakdown  - Assess and monitor skin integrity  - Assess and monitor nutrition and hydration status  - Monitor labs   - Assess for incontinence   - Turn and reposition patient  - Assist with mobility/ambulation  - Relieve pressure over bony prominences  - Avoid friction and shearing  - Provide appropriate hygiene as needed including keeping skin clean and dry  - Evaluate need for skin moisturizer/barrier cream  - Collaborate with interdisciplinary team   - Patient/family teaching  - Consider wound care consult   Outcome: Completed

## 2024-12-02 NOTE — BH TRANSITION RECORD
Contact Information: If you have any questions, concerns, pended studies, tests and/or procedures, or emergencies regarding your inpatient behavioral health visit. Please contact [facility] [phone] and ask to speak to a , nurse or physician. A contact is available 24 hours/ 7 days a week at this number.     Summary of Procedures Performed During your Stay:  Below is a list of major procedures performed during your hospital stay and a summary of results:  - Cardiac Procedures/Studies:     Component      Latest Ref Rng 11/19/2024   Ventricular Rate      BPM 64    Atrial Rate      BPM 64    WV Interval      ms 142    QRSD Interval      ms 96    QT Interval      ms 444    QTC Interval      ms 459    P Axis      degrees 64    QRS Axis      degrees 68    T Wave Axis      degrees 43    Normal sinus rhythm  Normal ECG      Pending Studies (From admission, onward)       Start     Ordered    11/18/24 1326  TSH, 3rd generation with Free T4 reflex  Once         11/18/24 1325                  Please follow up on the above pending studies with your PCP and/or referring provider.

## 2024-12-02 NOTE — PLAN OF CARE
Problem: Potential for Falls  Goal: Patient will remain free of falls  Description: INTERVENTIONS:  - Educate patient/family on patient safety including physical limitations  - Instruct patient to call for assistance with activity   - Consult OT/PT to assist with strengthening/mobility   - Keep Call bell within reach  - Keep bed low and locked with side rails adjusted as appropriate  - Keep care items and personal belongings within reach  - Initiate and maintain comfort rounds  - Make Fall Risk Sign visible to staff  - Offer Toileting every q2 Hours, in advance of need  - Initiate/Maintain bed alarm  - Obtain necessary fall risk management equipment: walker  - Apply yellow socks and bracelet for high fall risk patients  - Consider moving patient to room near nurses station  Outcome: Progressing     Problem: Ineffective Coping  Goal: Cooperates with admission process  Description: Interventions:   - Complete admission process  Outcome: Progressing  Goal: Identifies ineffective coping skills  Outcome: Progressing  Goal: Identifies healthy coping skills  Outcome: Progressing  Goal: Demonstrates healthy coping skills  Outcome: Progressing  Goal: Patient/Family participate in treatment and DC plans  Description: Interventions:  - Provide therapeutic environment  Outcome: Progressing  Goal: Patient/Family verbalizes awareness of resources  Outcome: Progressing  Goal: Understands least restrictive measures  Description: Interventions:  - Utilize least restrictive behavior  Outcome: Progressing  Goal: Free from restraint events  Description: - Utilize least restrictive measures   - Provide behavioral interventions   - Redirect inappropriate behaviors   Outcome: Progressing     Problem: Depression  Goal: Treatment Goal: Demonstrate behavioral control of depressive symptoms, verbalize feelings of improved mood/affect, and adopt new coping skills prior to discharge  Outcome: Progressing  Goal: Verbalize thoughts and  feelings  Description: Interventions:  - Assess and re-assess patient's level of risk   - Engage patient in 1:1 interactions, daily, for a minimum of 15 minutes   - Encourage patient to express feelings, fears, frustrations, hopes   Outcome: Progressing  Goal: Refrain from harming self  Description: Interventions:  - Monitor patient closely, per order   - Supervise medication ingestion, monitor effects and side effects   Outcome: Progressing  Goal: Refrain from isolation  Description: Interventions:  - Develop a trusting relationship   - Encourage socialization   Outcome: Progressing  Goal: Refrain from self-neglect  Outcome: Progressing  Goal: Complete daily ADLs, including personal hygiene independently, as able  Description: Interventions:  - Observe, teach, and assist patient with ADLS  -  Monitor and promote a balance of rest/activity, with adequate nutrition and elimination   Outcome: Progressing     Problem: Anxiety  Goal: Anxiety is at manageable level  Description: Interventions:  - Assess and monitor patient's anxiety level.   - Monitor for signs and symptoms (heart palpitations, chest pain, shortness of breath, headaches, nausea, feeling jumpy, restlessness, irritable, apprehensive).   - Collaborate with interdisciplinary team and initiate plan and interventions as ordered.  - [place] patient to unit/surroundings  - Explain treatment plan  - Encourage participation in care  - Encourage verbalization of concerns/fears  - Identify coping mechanisms  - Assist in developing anxiety-reducing skills  - Administer/offer alternative therapies  - Limit or eliminate stimulants  Outcome: Progressing     Problem: DISCHARGE PLANNING - CARE MANAGEMENT  Goal: Discharge to post-acute care or home with appropriate resources  Description: INTERVENTIONS:  - Conduct assessment to determine patient/family and health care team treatment goals, and need for post-acute services based on payer coverage, community resources, and  patient preferences, and barriers to discharge  - Address psychosocial, clinical, and financial barriers to discharge as identified in assessment in conjunction with the patient/family and health care team  - Arrange appropriate level of post-acute services according to patient’s   needs and preference and payer coverage in collaboration with the physician and health care team  - Communicate with and update the patient/family, physician, and health care team regarding progress on the discharge plan  - Arrange appropriate transportation to post-acute venues  Outcome: Progressing     Problem: Prexisting or High Potential for Compromised Skin Integrity  Goal: Skin integrity is maintained or improved  Description: INTERVENTIONS:  - Identify patients at risk for skin breakdown  - Assess and monitor skin integrity  - Assess and monitor nutrition and hydration status  - Monitor labs   - Assess for incontinence   - Turn and reposition patient  - Assist with mobility/ambulation  - Relieve pressure over bony prominences  - Avoid friction and shearing  - Provide appropriate hygiene as needed including keeping skin clean and dry  - Evaluate need for skin moisturizer/barrier cream  - Collaborate with interdisciplinary team   - Patient/family teaching  - Consider wound care consult   Outcome: Progressing

## 2024-12-02 NOTE — NURSING NOTE
Patient discharged from the unit on this day. Denied SI,HI, psychosis and or A/V. No questions verbalized. Patient is in agreement with discharge . Patient provided with after care appointment, discharge instructions and medication regime reviewed . AVS faxed to receiving facility and report called and nurse spoke to admission nurse. Transport team picked up patient.   .All belongings returned. Medication returned from pharmacy.

## 2024-12-02 NOTE — PLAN OF CARE
Problem: Potential for Falls  Goal: Patient will remain free of falls  Description: INTERVENTIONS:  - Educate patient/family on patient safety including physical limitations  - Instruct patient to call for assistance with activity   - Consult OT/PT to assist with strengthening/mobility   - Keep Call bell within reach  - Keep bed low and locked with side rails adjusted as appropriate  - Keep care items and personal belongings within reach  - Initiate and maintain comfort rounds  - Make Fall Risk Sign visible to staff  - Offer Toileting every q2 Hours, in advance of need  - Initiate/Maintain bed alarm  - Obtain necessary fall risk management equipment: walker  - Apply yellow socks and bracelet for high fall risk patients  - Consider moving patient to room near nurses station  Outcome: Progressing     Problem: Depression  Goal: Verbalize thoughts and feelings  Description: Interventions:  - Assess and re-assess patient's level of risk   - Engage patient in 1:1 interactions, daily, for a minimum of 15 minutes   - Encourage patient to express feelings, fears, frustrations, hopes   Outcome: Progressing     Problem: Anxiety  Goal: Anxiety is at manageable level  Description: Interventions:  - Assess and monitor patient's anxiety level.   - Monitor for signs and symptoms (heart palpitations, chest pain, shortness of breath, headaches, nausea, feeling jumpy, restlessness, irritable, apprehensive).   - Collaborate with interdisciplinary team and initiate plan and interventions as ordered.  - [place] patient to unit/surroundings  - Explain treatment plan  - Encourage participation in care  - Encourage verbalization of concerns/fears  - Identify coping mechanisms  - Assist in developing anxiety-reducing skills  - Administer/offer alternative therapies  - Limit or eliminate stimulants  Outcome: Progressing

## 2024-12-02 NOTE — PLAN OF CARE
Problem: DISCHARGE PLANNING - CARE MANAGEMENT  Goal: Discharge to post-acute care or home with appropriate resources  Description: INTERVENTIONS:  - Conduct assessment to determine patient/family and health care team treatment goals, and need for post-acute services based on payer coverage, community resources, and patient preferences, and barriers to discharge  - Address psychosocial, clinical, and financial barriers to discharge as identified in assessment in conjunction with the patient/family and health care team  - Arrange appropriate level of post-acute services according to patient’s   needs and preference and payer coverage in collaboration with the physician and health care team  - Communicate with and update the patient/family, physician, and health care team regarding progress on the discharge plan  - Arrange appropriate transportation to post-acute venues  Outcome: Adequate for Discharge   [facility] via [facility] 2 PM . DC meds sent to facility pharmacy [facility].

## 2024-12-03 NOTE — TELEPHONE ENCOUNTER
The Patient is calling due to missing her pre-op testing over the phone call. Also she is please asking if all pre op testing that she needs done be faxed to :      [phone]  [address] [place] [address].      [phone] patient

## 2024-12-03 NOTE — TELEPHONE ENCOUNTER
Returned patients call - no answer, left message explaining she needs to follow-up with her PCP for clearance. Gave my direct # to call me back.

## 2024-12-09 ENCOUNTER — PREP FOR PROCEDURE (OUTPATIENT)
Age: 59
End: 2024-12-09

## 2024-12-09 ENCOUNTER — OFFICE VISIT (OUTPATIENT)
Age: 59
End: 2024-12-09
Payer: COMMERCIAL

## 2024-12-09 VITALS — WEIGHT: 213 LBS | HEIGHT: 61 IN | BODY MASS INDEX: 40.22 KG/M2

## 2024-12-09 DIAGNOSIS — M54.50 LUMBAR SPINE PAIN: ICD-10-CM

## 2024-12-09 DIAGNOSIS — M41.9 SCOLIOSIS, UNSPECIFIED SCOLIOSIS TYPE, UNSPECIFIED SPINAL REGION: Primary | ICD-10-CM

## 2024-12-09 DIAGNOSIS — S32.009K PSEUDOARTHROSIS OF LUMBAR SPINE: ICD-10-CM

## 2024-12-09 DIAGNOSIS — Z98.890 HISTORY OF LUMBAR SURGERY: ICD-10-CM

## 2024-12-09 DIAGNOSIS — M54.16 LUMBAR RADICULOPATHY: ICD-10-CM

## 2024-12-09 DIAGNOSIS — R26.2 AMBULATORY DYSFUNCTION: ICD-10-CM

## 2024-12-09 PROCEDURE — 99214 OFFICE O/P EST MOD 30 MIN: CPT | Performed by: ORTHOPAEDIC SURGERY

## 2024-12-09 NOTE — H&P (VIEW-ONLY)
[facility] ORTHOPEDIC SPINE SURGERY  DR. MOE LONDON MD  [phone]    [address], [address]  [place] [address]      HISTORY OF PRESENT ILLNESS:    Samantha Rodriguez is a 58 y.o. female who presents for follow-up evaluation of lumbar spine. The patient was last seen in the office on 10/15/2024, where possible surgical intervention was discussed. At this time, patient reports that she continues to be unable to stand and has significant pain in her low back.  Patient presents today for signing of surgical consent. Patient denies any questions about the upcoming surgical procedure.       ALLERGIES: No Known Allergies    MEDICATIONS:    Current Outpatient Medications:     acetaminophen (TYLENOL) 325 mg tablet, Take 2 tablets (650 mg total) by mouth every 4 (four) hours as needed for moderate pain, Disp: , Rfl:     aluminum-magnesium hydroxide-simethicone (MAALOX) 5411-0835-443 mg/30 mL suspension, Take 30 mL by mouth in the morning, Disp: 900 mL, Rfl: 0    amLODIPine (NORVASC) 5 mg tablet, Take 1 tablet (5 mg total) by mouth daily, Disp: 30 tablet, Rfl: 0    ARIPiprazole (ABILIFY) 10 mg tablet, Take 1 tablet (10 mg total) by mouth daily, Disp: 30 tablet, Rfl: 0    aspirin (Aspirin Low Dose) 81 mg EC tablet, Take 1 tablet (81 mg total) by mouth daily, Disp: 30 tablet, Rfl: 0    atorvastatin (LIPITOR) 40 mg tablet, Take 1 tablet (40 mg total) by mouth daily with dinner, Disp: 30 tablet, Rfl: 0    busPIRone (BUSPAR) 15 mg tablet, Take 1 tablet (15 mg total) by mouth 3 (three) times a day, Disp: 90 tablet, Rfl: 0    Cholecalciferol (VITAMIN D3) 1,000 units tablet, Take 1 tablet (1,000 Units total) by mouth daily, Disp: 30 tablet, Rfl: 0    cyclobenzaprine (FLEXERIL) 10 mg tablet, Take 1 tablet (10 mg total) by mouth 3 (three) times a day as needed for muscle spasms, Disp: , Rfl:     Diclofenac Sodium (VOLTAREN) 1 %, Apply 2 g topically 4 (four) times a day as needed (pain),  Disp: 150 g, Rfl: 3    hydrOXYzine HCL (ATARAX) 25 mg tablet, Take 1 tablet (25 mg total) by mouth 3 (three) times a day, Disp: 90 tablet, Rfl: 0    loratadine (CLARITIN) 10 mg tablet, Take 1 tablet (10 mg total) by mouth daily, Disp: 30 tablet, Rfl: 0    lubiprostone (AMITIZA) 8 mcg capsule, Take 1 capsule (8 mcg total) by mouth 2 (two) times a day, Disp: 60 capsule, Rfl: 0    magnesium Oxide (MAG-OX) 400 mg TABS, Take 1 tablet (400 mg total) by mouth daily, Disp: 30 tablet, Rfl: 0    morphine (MSIR) 15 mg tablet, Take 1 tablet (15 mg total) by mouth 2 (two) times a day Max Daily Amount: 30 mg, Disp: 60 tablet, Rfl: 0    pantoprazole (PROTONIX) 40 mg tablet, Take 1 tablet (40 mg total) by mouth daily in the early morning, Disp: 30 tablet, Rfl: 0    prazosin (MINIPRESS) 2 mg capsule, Take 1 capsule (2 mg total) by mouth daily at bedtime, Disp: 30 capsule, Rfl: 0    pregabalin (LYRICA) 150 mg capsule, Take 1 capsule (150 mg total) by mouth 3 (three) times a day, Disp: 90 capsule, Rfl: 0    senna-docusate sodium (SENOKOT S) 8.6-50 mg per tablet, Take 1 tablet by mouth daily as needed for constipation, Disp: , Rfl:     sertraline (ZOLOFT) 100 mg tablet, Take 2 tablets (200 mg total) by mouth daily at bedtime, Disp: 60 tablet, Rfl: 0    traZODone (DESYREL) 300 MG tablet, Take 1 tablet (300 mg total) by mouth daily at bedtime, Disp: 30 tablet, Rfl: 0    Valbenazine Tosylate (Ingrezza) 60 MG CAPS, Take 1 capsule by mouth in the morning, Disp: 30 capsule, Rfl: 0     PAST MEDICAL HISTORY:   Past Medical History:   Diagnosis Date    Anxiety     Arthritis     left knee    Arthritis of right knee 10/6/2020    At risk for falls     Bipolar 2 disorder (HCC)     FOLLOWS WITH PSYCHIATRIST. CONTINUE LAMOTRIGINE; RESOLVED: [id number]    Depression     Familial tremor     both hands    Fibromyalgia     LAST ASSESSED: 08DEC2017    GERD (gastroesophageal reflux disease)     Hearing aid worn     left ear    [place] (hard of hearing)     left  ear    Hyperlipidemia     Hypertension     Left-sided weakness     Lumbar disc disease with radiculopathy 2018    Memory loss of unknown cause     long and short term    Migraine     Multiple closed fractures of metatarsal bone of right foot 2021    Obesity     Obesity, Class II, BMI 35-39.9     Osteoarthritis of both hips 10/31/2016    Osteoarthritis of knee 2013    Description: Continue Tylenol and Naproxen. Encourage exercise and weight loss. Patient refused physical therapy. I will refer the patient back to Orthopedics.    Overactive bladder     Panic attack     Patellofemoral disorder of both knees 2020    Post traumatic stress disorder     Primary localized osteoarthritis of both knees 2017    Primary osteoarthritis of both knees 2020    S/P insertion of spinal cord stimulator     no remote    S/P total knee arthroplasty, right 3/10/2022    Sacroiliitis (HCC) 2017    Seasonal allergies     Seizure-like activity (HCC) 6/3/2022    Seizures (HCC)     possible seizure like activity    Small bowel obstruction (HCC) 3/24/2023    Status post total knee replacement, left 2022    Stroke ([facility])     questionable stroke 2009    Tardive dyskinesia     PATIENT STATES    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  CONT ASA 81 MG DAILY; RESOLVED: [id number]    Urinary incontinence     Uses walker     Wears dentures     partial lower / full upper- doesnt wear    Wears glasses        PAST SURGICAL HISTORY:  Past Surgical History:   Procedure Laterality Date    BACK SURGERY       SECTION      COLONOSCOPY      RESOLVED: 2016    EAR SURGERY      EGD      HYSTERECTOMY  2004    MYRINGOTOMY W/ TUBES Left     NECK SURGERY  2019    RI ARTHRP KNE CONDYLE&PLATU MEDIAL&LAT COMPARTMENTS Right 3/9/2022    Procedure: ARTHROPLASTY KNEE TOTAL;  Surgeon: Chandni Tuttle DO;  [facility];  Service: Orthopedics    RI ARTHRP KNE CONDYLE&PLATU MEDIAL&LAT  COMPARTMENTS Left 7/5/2022    Procedure: ARTHROPLASTY KNEE TOTAL;  Surgeon: Chandni Tuttle DO;  [facility];  Service: Orthopedics    MT CYSTOURETHROSCOPY N/A 2/18/2016    Procedure: CYSTOSCOPY, BOTOX INJECTION;  Surgeon: Abraham Teague MD;  [facility];  Service: Gynecology    MT INSJ/RPLCMT SPINAL NPG/RCVR POCKET CRTJ&CONNJ Right 2/10/2021    Procedure: REPLACEMENT IMPLANTABLE PULSE GENERATOR DORSAL SPINAL COLUMN STIMULATOR, RIGHT;  Surgeon: Carlos Alberto Jacome MD;  [facility];  Service: Neurosurgery    MT PRQ IMPLTJ NSTIM ELECTRODE ARRAY EPIDURAL Right 7/28/2020    Procedure: INSERTION THORACIC DORSAL COLUMN SPINAL CORD STIMULATOR PERCUTANEOUS W IMPLANTABLE PULSE GENERATOR, RIGHT;  Surgeon: Carlos Alberto Jacome MD;  Location: [facility];  Service: Neurosurgery    TONSILLECTOMY      TUBAL LIGATION  1986    UPPER GASTROINTESTINAL ENDOSCOPY  09/2020       SOCIAL HISTORY:  Social History     Tobacco Use   Smoking Status Never   Smokeless Tobacco Never          PHYSICAL EXAM:  58 y.o. female sitting comfortably on exam chair in no apparent distress.   Difficulty rising from a seated position.   Ambulates without normal gait, leaning forward, shuffling gait   Ambulated with use of rolling walker   Loss of coronal balance  Patient is unable to go up on toes and on heels   Patient is  unable to balance on 1 leg   Strength RLE: hip flexion 3/5, knee extension 3/5, knee flexion 4/5 , dorsiflexion 3/5, plantar flexion 4/5  Strength LLE: hip flexion 3/5, knee extension 3/5, knee flexion 4/5 , dorsiflexion 3/5, plantar flexion 4/5  Sensation is intact and equal bilaterally in lower extremities  Reflexes: diminished in bilateral lower extremities  Non-TTP over spinous processes and paraspinal muscles  Heart - regular rate and rhythm. No murmurs, clicks, or gallops appreciated.  Lungs - clear to auscultation in all lung fields   Abdomen - soft and non-tender to palpation. Normoactive bowel sounds in  all 4 quadrants.         RADIOGRAPHIC STUDIES:  X-ray, bilateral hips, 4/5/2023: 2 view normal x-rays of the pelvis and hips.  No evidence of significant SI joint abnormality.  Instrumentation down to the level of S1.  CT, abdomen pelvis, 5/25/2023: Evidence of prior extensive fusion from T8-S1 on the left and T9-S1 on the right side.  There is evidence of prior spinal cord placement at the T8-9 level.  There is also evidence of pseudoarthrosis at the upper part of the construct, T8-9 and possibly T9-10.  Upper thoracic spine were not visualized on the CT scan.  There is evidence of anterior interbody fusion at L2-3, L3-4, L4-5 and L5-S1.  There is clear evidence of pseudoarthrosis at L5-S1 both anteriorly and posteriorly.  Bilateral S1 screws are loose.  There is evidence of subsidence anteriorly with screw loosening and significant neuroforaminal stenosis.  X-rays, lumbar spine, 8/26/2024: Multilevel anterior posterior lumbar fusion with instrumentation.  There is evidence of screw loosening and interbody loosening at L5-S1.  Xrays, scoliosis films, 9/03/2024: Extensive thoracolumbar fusion to the level of S1.  There is evidence of deformity focalized to the L5-S1 level with significant sagittal imbalance.      ASSESSMENT:  1. Scoliosis, unspecified scoliosis type, unspecified spinal region  2. Lumbar spine pain  3. History of lumbar surgery  4. Ambulatory dysfunction  5. Pseudoarthrosis of lumbar spine  6. Lumbar radiculopathy      PLAN:  58 y.o. female with low back pain, lumbar radiculopathy, history of thoracolumbar surgery, and ambulatory dysfunction.     The patient's MRI and x-rays were reviewed.  The patient has failed injections and physical therapy which provided minimal to no pain relief. Surgical intervention was discussed.  There was an in depth conversation had about the procedure that would be performed. It was discussed surgery is warranted to preserve function, will not alleviate pain, and there  "is potential the patient may have an increase in pain following surgery.     Risks of surgery, including but not limited to, anesthesia complications, infection, damage to nerves and blood vessels, blood loss needing a transfusion, blood clots, postoperative stiffness, residual pain, residual weakness, drop foot, dural tearing, adjacent segment disease, and even death were discussed at length.  If the patient has a dural tear, they will be in bed for approximately 32 hours following surgery.      The patient understands the risks and benefits of all mentioned treatment options and has no further questions. The patient has elected to proceed forward with patient procedure.  The plan is to remove the bottom part of hardware, remove the S1 screws and we instrument the sacrum in addition to adding pelvic screws.  Osteotomy will be performed to correct the deformity.  She is aware that the entire deformity cannot be corrected.  She is also aware that in the long run the anterior column may need to be bone grafted and the failed hardware at L5-S1 may need to be removed..  If this per procedure goes well, the loose hardware in the upper thoracic spine will also be removed.    She has been consented for \"Removal of right T7 pedicle screw and resection of the right proximal rudy; Removal hardware L3-S1, posterior osteotomy L5-S1, posterior fusion L5-S1, revision instrumentation L2 to pelvis; application of bilateral S2 alar screws; possible application of iliac bolts; possible bone grafting of the disc at L5-S1; iliac crest bone graft\"    Pre-operative clearance in the form of primary care physician clearance, blood work, EKG, and chest x-ray are required prior to surgical intervention. The patient was informed they may have to go to a short term rehab facility if they do not have support at home following discharge from the hospital.   A consent form was obtained today . Surgery is tentatively scheduled for 12/19/2024. I " will see the patient back in office 2 week post-operatively.        Scribe Attestation      I,:  Casie Vargas PA-C am acting as a scribe while in the presence of the attending physician.:       I,:  Yenni Stevens MD personally performed the services described in this documentation    as scribed in my presence.:

## 2024-12-09 NOTE — H&P
[facility] ORTHOPEDIC SPINE SURGERY  DR. MOE LONDON MD  [phone]    [address], [address]  [place] [address]      HISTORY OF PRESENT ILLNESS:    Samantha Rodriguez is a 58 y.o. female who presents for follow-up evaluation of lumbar spine. The patient was last seen in the office on 10/15/2024, where possible surgical intervention was discussed. At this time, patient reports that she continues to be unable to stand and has significant pain in her low back.  Patient presents today for signing of surgical consent. Patient denies any questions about the upcoming surgical procedure.       ALLERGIES: No Known Allergies    MEDICATIONS:    Current Outpatient Medications:     acetaminophen (TYLENOL) 325 mg tablet, Take 2 tablets (650 mg total) by mouth every 4 (four) hours as needed for moderate pain, Disp: , Rfl:     aluminum-magnesium hydroxide-simethicone (MAALOX) 9497-2466-195 mg/30 mL suspension, Take 30 mL by mouth in the morning, Disp: 900 mL, Rfl: 0    amLODIPine (NORVASC) 5 mg tablet, Take 1 tablet (5 mg total) by mouth daily, Disp: 30 tablet, Rfl: 0    ARIPiprazole (ABILIFY) 10 mg tablet, Take 1 tablet (10 mg total) by mouth daily, Disp: 30 tablet, Rfl: 0    aspirin (Aspirin Low Dose) 81 mg EC tablet, Take 1 tablet (81 mg total) by mouth daily, Disp: 30 tablet, Rfl: 0    atorvastatin (LIPITOR) 40 mg tablet, Take 1 tablet (40 mg total) by mouth daily with dinner, Disp: 30 tablet, Rfl: 0    busPIRone (BUSPAR) 15 mg tablet, Take 1 tablet (15 mg total) by mouth 3 (three) times a day, Disp: 90 tablet, Rfl: 0    Cholecalciferol (VITAMIN D3) 1,000 units tablet, Take 1 tablet (1,000 Units total) by mouth daily, Disp: 30 tablet, Rfl: 0    cyclobenzaprine (FLEXERIL) 10 mg tablet, Take 1 tablet (10 mg total) by mouth 3 (three) times a day as needed for muscle spasms, Disp: , Rfl:     Diclofenac Sodium (VOLTAREN) 1 %, Apply 2 g topically 4 (four) times a day as needed (pain),  Disp: 150 g, Rfl: 3    hydrOXYzine HCL (ATARAX) 25 mg tablet, Take 1 tablet (25 mg total) by mouth 3 (three) times a day, Disp: 90 tablet, Rfl: 0    loratadine (CLARITIN) 10 mg tablet, Take 1 tablet (10 mg total) by mouth daily, Disp: 30 tablet, Rfl: 0    lubiprostone (AMITIZA) 8 mcg capsule, Take 1 capsule (8 mcg total) by mouth 2 (two) times a day, Disp: 60 capsule, Rfl: 0    magnesium Oxide (MAG-OX) 400 mg TABS, Take 1 tablet (400 mg total) by mouth daily, Disp: 30 tablet, Rfl: 0    morphine (MSIR) 15 mg tablet, Take 1 tablet (15 mg total) by mouth 2 (two) times a day Max Daily Amount: 30 mg, Disp: 60 tablet, Rfl: 0    pantoprazole (PROTONIX) 40 mg tablet, Take 1 tablet (40 mg total) by mouth daily in the early morning, Disp: 30 tablet, Rfl: 0    prazosin (MINIPRESS) 2 mg capsule, Take 1 capsule (2 mg total) by mouth daily at bedtime, Disp: 30 capsule, Rfl: 0    pregabalin (LYRICA) 150 mg capsule, Take 1 capsule (150 mg total) by mouth 3 (three) times a day, Disp: 90 capsule, Rfl: 0    senna-docusate sodium (SENOKOT S) 8.6-50 mg per tablet, Take 1 tablet by mouth daily as needed for constipation, Disp: , Rfl:     sertraline (ZOLOFT) 100 mg tablet, Take 2 tablets (200 mg total) by mouth daily at bedtime, Disp: 60 tablet, Rfl: 0    traZODone (DESYREL) 300 MG tablet, Take 1 tablet (300 mg total) by mouth daily at bedtime, Disp: 30 tablet, Rfl: 0    Valbenazine Tosylate (Ingrezza) 60 MG CAPS, Take 1 capsule by mouth in the morning, Disp: 30 capsule, Rfl: 0     PAST MEDICAL HISTORY:   Past Medical History:   Diagnosis Date    Anxiety     Arthritis     left knee    Arthritis of right knee 10/6/2020    At risk for falls     Bipolar 2 disorder (HCC)     FOLLOWS WITH PSYCHIATRIST. CONTINUE LAMOTRIGINE; RESOLVED: [id number]    Depression     Familial tremor     both hands    Fibromyalgia     LAST ASSESSED: 08DEC2017    GERD (gastroesophageal reflux disease)     Hearing aid worn     left ear    [place] (hard of hearing)     left  ear    Hyperlipidemia     Hypertension     Left-sided weakness     Lumbar disc disease with radiculopathy 2018    Memory loss of unknown cause     long and short term    Migraine     Multiple closed fractures of metatarsal bone of right foot 2021    Obesity     Obesity, Class II, BMI 35-39.9     Osteoarthritis of both hips 10/31/2016    Osteoarthritis of knee 2013    Description: Continue Tylenol and Naproxen. Encourage exercise and weight loss. Patient refused physical therapy. I will refer the patient back to Orthopedics.    Overactive bladder     Panic attack     Patellofemoral disorder of both knees 2020    Post traumatic stress disorder     Primary localized osteoarthritis of both knees 2017    Primary osteoarthritis of both knees 2020    S/P insertion of spinal cord stimulator     no remote    S/P total knee arthroplasty, right 3/10/2022    Sacroiliitis (HCC) 2017    Seasonal allergies     Seizure-like activity (HCC) 6/3/2022    Seizures (HCC)     possible seizure like activity    Small bowel obstruction (HCC) 3/24/2023    Status post total knee replacement, left 2022    Stroke (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  CONT ASA 81 MG DAILY; RESOLVED: [id number]    Urinary incontinence     Uses walker     Wears dentures     partial lower / full upper- doesnt wear    Wears glasses        PAST SURGICAL HISTORY:  Past Surgical History:   Procedure Laterality Date    BACK SURGERY       SECTION      COLONOSCOPY      RESOLVED: 2016    EAR SURGERY      EGD      HYSTERECTOMY  2004    MYRINGOTOMY W/ TUBES Left     NECK SURGERY  2019    SD ARTHRP KNE CONDYLE&PLATU MEDIAL&LAT COMPARTMENTS Right 3/9/2022    Procedure: ARTHROPLASTY KNEE TOTAL;  Surgeon: Chandni Tuttle DO;  [facility];  Service: Orthopedics    SD ARTHRP KNE CONDYLE&PLATU MEDIAL&LAT  COMPARTMENTS Left 7/5/2022    Procedure: ARTHROPLASTY KNEE TOTAL;  Surgeon: Chandni Tuttle DO;  [facility];  Service: Orthopedics    SD CYSTOURETHROSCOPY N/A 2/18/2016    Procedure: CYSTOSCOPY, BOTOX INJECTION;  Surgeon: Abraham Teague MD;  [facility];  Service: Gynecology    SD INSJ/RPLCMT SPINAL NPG/RCVR POCKET CRTJ&CONNJ Right 2/10/2021    Procedure: REPLACEMENT IMPLANTABLE PULSE GENERATOR DORSAL SPINAL COLUMN STIMULATOR, RIGHT;  Surgeon: Carlos Alberto Jacome MD;  [facility];  Service: Neurosurgery    SD PRQ IMPLTJ NSTIM ELECTRODE ARRAY EPIDURAL Right 7/28/2020    Procedure: INSERTION THORACIC DORSAL COLUMN SPINAL CORD STIMULATOR PERCUTANEOUS W IMPLANTABLE PULSE GENERATOR, RIGHT;  Surgeon: Carlos Alberto Jacome MD;  [facility];  Service: Neurosurgery    TONSILLECTOMY      TUBAL LIGATION  1986    UPPER GASTROINTESTINAL ENDOSCOPY  09/2020       SOCIAL HISTORY:  Social History     Tobacco Use   Smoking Status Never   Smokeless Tobacco Never          PHYSICAL EXAM:  58 y.o. female sitting comfortably on exam chair in no apparent distress.   Difficulty rising from a seated position.   Ambulates without normal gait, leaning forward, shuffling gait   Ambulated with use of rolling walker   Loss of coronal balance  Patient is unable to go up on toes and on heels   Patient is  unable to balance on 1 leg   Strength RLE: hip flexion 3/5, knee extension 3/5, knee flexion 4/5 , dorsiflexion 3/5, plantar flexion 4/5  Strength LLE: hip flexion 3/5, knee extension 3/5, knee flexion 4/5 , dorsiflexion 3/5, plantar flexion 4/5  Sensation is intact and equal bilaterally in lower extremities  Reflexes: diminished in bilateral lower extremities  Non-TTP over spinous processes and paraspinal muscles  Heart - regular rate and rhythm. No murmurs, clicks, or gallops appreciated.  Lungs - clear to auscultation in all lung fields   Abdomen - soft and non-tender to palpation. Normoactive bowel sounds in  all 4 quadrants.         RADIOGRAPHIC STUDIES:  X-ray, bilateral hips, 4/5/2023: 2 view normal x-rays of the pelvis and hips.  No evidence of significant SI joint abnormality.  Instrumentation down to the level of S1.  CT, abdomen pelvis, 5/25/2023: Evidence of prior extensive fusion from T8-S1 on the left and T9-S1 on the right side.  There is evidence of prior spinal cord placement at the T8-9 level.  There is also evidence of pseudoarthrosis at the upper part of the construct, T8-9 and possibly T9-10.  Upper thoracic spine were not visualized on the CT scan.  There is evidence of anterior interbody fusion at L2-3, L3-4, L4-5 and L5-S1.  There is clear evidence of pseudoarthrosis at L5-S1 both anteriorly and posteriorly.  Bilateral S1 screws are loose.  There is evidence of subsidence anteriorly with screw loosening and significant neuroforaminal stenosis.  X-rays, lumbar spine, 8/26/2024: Multilevel anterior posterior lumbar fusion with instrumentation.  There is evidence of screw loosening and interbody loosening at L5-S1.  Xrays, scoliosis films, 9/03/2024: Extensive thoracolumbar fusion to the level of S1.  There is evidence of deformity focalized to the L5-S1 level with significant sagittal imbalance.      ASSESSMENT:  1. Scoliosis, unspecified scoliosis type, unspecified spinal region  2. Lumbar spine pain  3. History of lumbar surgery  4. Ambulatory dysfunction  5. Pseudoarthrosis of lumbar spine  6. Lumbar radiculopathy      PLAN:  58 y.o. female with low back pain, lumbar radiculopathy, history of thoracolumbar surgery, and ambulatory dysfunction.     The patient's MRI and x-rays were reviewed.  The patient has failed injections and physical therapy which provided minimal to no pain relief. Surgical intervention was discussed.  There was an in depth conversation had about the procedure that would be performed. It was discussed surgery is warranted to preserve function, will not alleviate pain, and there  "is potential the patient may have an increase in pain following surgery.     Risks of surgery, including but not limited to, anesthesia complications, infection, damage to nerves and blood vessels, blood loss needing a transfusion, blood clots, postoperative stiffness, residual pain, residual weakness, drop foot, dural tearing, adjacent segment disease, and even death were discussed at length.  If the patient has a dural tear, they will be in bed for approximately 32 hours following surgery.      The patient understands the risks and benefits of all mentioned treatment options and has no further questions. The patient has elected to proceed forward with patient procedure.  The plan is to remove the bottom part of hardware, remove the S1 screws and we instrument the sacrum in addition to adding pelvic screws.  Osteotomy will be performed to correct the deformity.  She is aware that the entire deformity cannot be corrected.  She is also aware that in the long run the anterior column may need to be bone grafted and the failed hardware at L5-S1 may need to be removed..  If this per procedure goes well, the loose hardware in the upper thoracic spine will also be removed.    She has been consented for \"Removal of right T7 pedicle screw and resection of the right proximal rudy; Removal hardware L3-S1, posterior osteotomy L5-S1, posterior fusion L5-S1, revision instrumentation L2 to pelvis; application of bilateral S2 alar screws; possible application of iliac bolts; possible bone grafting of the disc at L5-S1; iliac crest bone graft\"    Pre-operative clearance in the form of primary care physician clearance, blood work, EKG, and chest x-ray are required prior to surgical intervention. The patient was informed they may have to go to a short term rehab facility if they do not have support at home following discharge from the hospital.   A consent form was obtained today . Surgery is tentatively scheduled for 12/19/2024. I " will see the patient back in office 2 week post-operatively.        Scribe Attestation      I,:  Casie Vargas PA-C am acting as a scribe while in the presence of the attending physician.:       I,:  Yenni Stevens MD personally performed the services described in this documentation    as scribed in my presence.:

## 2024-12-09 NOTE — PROGRESS NOTES
[facility] ORTHOPEDIC SPINE SURGERY  DR. MOE LONDON MD  [phone]    [address], [address]  [place] [address]      HISTORY OF PRESENT ILLNESS:    Samantha Rodriguez is a 58 y.o. female who presents for follow-up evaluation of lumbar spine. The patient was last seen in the office on 10/15/2024, where possible surgical intervention was discussed. At this time, patient reports that she continues to be unable to stand and has significant pain in her low back.  Patient presents today for signing of surgical consent. Patient denies any questions about the upcoming surgical procedure.       ALLERGIES: No Known Allergies    MEDICATIONS:    Current Outpatient Medications:     acetaminophen (TYLENOL) 325 mg tablet, Take 2 tablets (650 mg total) by mouth every 4 (four) hours as needed for moderate pain, Disp: , Rfl:     aluminum-magnesium hydroxide-simethicone (MAALOX) 3629-7514-309 mg/30 mL suspension, Take 30 mL by mouth in the morning, Disp: 900 mL, Rfl: 0    amLODIPine (NORVASC) 5 mg tablet, Take 1 tablet (5 mg total) by mouth daily, Disp: 30 tablet, Rfl: 0    ARIPiprazole (ABILIFY) 10 mg tablet, Take 1 tablet (10 mg total) by mouth daily, Disp: 30 tablet, Rfl: 0    aspirin (Aspirin Low Dose) 81 mg EC tablet, Take 1 tablet (81 mg total) by mouth daily, Disp: 30 tablet, Rfl: 0    atorvastatin (LIPITOR) 40 mg tablet, Take 1 tablet (40 mg total) by mouth daily with dinner, Disp: 30 tablet, Rfl: 0    busPIRone (BUSPAR) 15 mg tablet, Take 1 tablet (15 mg total) by mouth 3 (three) times a day, Disp: 90 tablet, Rfl: 0    Cholecalciferol (VITAMIN D3) 1,000 units tablet, Take 1 tablet (1,000 Units total) by mouth daily, Disp: 30 tablet, Rfl: 0    cyclobenzaprine (FLEXERIL) 10 mg tablet, Take 1 tablet (10 mg total) by mouth 3 (three) times a day as needed for muscle spasms, Disp: , Rfl:     Diclofenac Sodium (VOLTAREN) 1 %, Apply 2 g topically 4 (four) times a day as needed (pain),  Disp: 150 g, Rfl: 3    hydrOXYzine HCL (ATARAX) 25 mg tablet, Take 1 tablet (25 mg total) by mouth 3 (three) times a day, Disp: 90 tablet, Rfl: 0    loratadine (CLARITIN) 10 mg tablet, Take 1 tablet (10 mg total) by mouth daily, Disp: 30 tablet, Rfl: 0    lubiprostone (AMITIZA) 8 mcg capsule, Take 1 capsule (8 mcg total) by mouth 2 (two) times a day, Disp: 60 capsule, Rfl: 0    magnesium Oxide (MAG-OX) 400 mg TABS, Take 1 tablet (400 mg total) by mouth daily, Disp: 30 tablet, Rfl: 0    morphine (MSIR) 15 mg tablet, Take 1 tablet (15 mg total) by mouth 2 (two) times a day Max Daily Amount: 30 mg, Disp: 60 tablet, Rfl: 0    pantoprazole (PROTONIX) 40 mg tablet, Take 1 tablet (40 mg total) by mouth daily in the early morning, Disp: 30 tablet, Rfl: 0    prazosin (MINIPRESS) 2 mg capsule, Take 1 capsule (2 mg total) by mouth daily at bedtime, Disp: 30 capsule, Rfl: 0    pregabalin (LYRICA) 150 mg capsule, Take 1 capsule (150 mg total) by mouth 3 (three) times a day, Disp: 90 capsule, Rfl: 0    senna-docusate sodium (SENOKOT S) 8.6-50 mg per tablet, Take 1 tablet by mouth daily as needed for constipation, Disp: , Rfl:     sertraline (ZOLOFT) 100 mg tablet, Take 2 tablets (200 mg total) by mouth daily at bedtime, Disp: 60 tablet, Rfl: 0    traZODone (DESYREL) 300 MG tablet, Take 1 tablet (300 mg total) by mouth daily at bedtime, Disp: 30 tablet, Rfl: 0    Valbenazine Tosylate (Ingrezza) 60 MG CAPS, Take 1 capsule by mouth in the morning, Disp: 30 capsule, Rfl: 0     PAST MEDICAL HISTORY:   Past Medical History:   Diagnosis Date    Anxiety     Arthritis     left knee    Arthritis of right knee 10/6/2020    At risk for falls     Bipolar 2 disorder (HCC)     FOLLOWS WITH PSYCHIATRIST. CONTINUE LAMOTRIGINE; RESOLVED: [id number]    Depression     Familial tremor     both hands    Fibromyalgia     LAST ASSESSED: [id number]    GERD (gastroesophageal reflux disease)     Hearing aid worn     left ear    [place] (hard of hearing)     left  ear    Hyperlipidemia     Hypertension     Left-sided weakness     Lumbar disc disease with radiculopathy 2018    Memory loss of unknown cause     long and short term    Migraine     Multiple closed fractures of metatarsal bone of right foot 2021    Obesity     Obesity, Class II, BMI 35-39.9     Osteoarthritis of both hips 10/31/2016    Osteoarthritis of knee 2013    Description: Continue Tylenol and Naproxen. Encourage exercise and weight loss. Patient refused physical therapy. I will refer the patient back to Orthopedics.    Overactive bladder     Panic attack     Patellofemoral disorder of both knees 2020    Post traumatic stress disorder     Primary localized osteoarthritis of both knees 2017    Primary osteoarthritis of both knees 2020    S/P insertion of spinal cord stimulator     no remote    S/P total knee arthroplasty, right 3/10/2022    Sacroiliitis (HCC) 2017    Seasonal allergies     Seizure-like activity (HCC) 6/3/2022    Seizures (HCC)     possible seizure like activity    Small bowel obstruction (HCC) 3/24/2023    Status post total knee replacement, left 2022    Stroke ([facility])     questionable stroke 2009    Tardive dyskinesia     PATIENT STATES    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  CONT ASA 81 MG DAILY; RESOLVED: [id number]    Urinary incontinence     Uses walker     Wears dentures     partial lower / full upper- doesnt wear    Wears glasses        PAST SURGICAL HISTORY:  Past Surgical History:   Procedure Laterality Date    BACK SURGERY       SECTION      COLONOSCOPY      RESOLVED: 2016    EAR SURGERY      EGD      HYSTERECTOMY  2004    MYRINGOTOMY W/ TUBES Left     NECK SURGERY  2019    MN ARTHRP KNE CONDYLE&PLATU MEDIAL&LAT COMPARTMENTS Right 3/9/2022    Procedure: ARTHROPLASTY KNEE TOTAL;  Surgeon: Chandni Tuttle DO;  Location: [facility];  Service: Orthopedics    MN ARTHRP KNE CONDYLE&PLATU MEDIAL&LAT  COMPARTMENTS Left 7/5/2022    Procedure: ARTHROPLASTY KNEE TOTAL;  Surgeon: Chandni Tuttle DO;  Location: [facility];  Service: Orthopedics    TX CYSTOURETHROSCOPY N/A 2/18/2016    Procedure: CYSTOSCOPY, BOTOX INJECTION;  Surgeon: Abraham Teague MD;  Location: [facility];  Service: Gynecology    TX INSJ/RPLCMT SPINAL NPG/RCVR POCKET CRTJ&CONNJ Right 2/10/2021    Procedure: REPLACEMENT IMPLANTABLE PULSE GENERATOR DORSAL SPINAL COLUMN STIMULATOR, RIGHT;  Surgeon: Carlos Alberto Jacome MD;  Location: [facility];  Service: Neurosurgery    TX PRQ IMPLTJ NSTIM ELECTRODE ARRAY EPIDURAL Right 7/28/2020    Procedure: INSERTION THORACIC DORSAL COLUMN SPINAL CORD STIMULATOR PERCUTANEOUS W IMPLANTABLE PULSE GENERATOR, RIGHT;  Surgeon: Carlos Alberto Jacome MD;  Location: [facility];  Service: Neurosurgery    TONSILLECTOMY      TUBAL LIGATION  1986    UPPER GASTROINTESTINAL ENDOSCOPY  09/2020       SOCIAL HISTORY:  Social History     Tobacco Use   Smoking Status Never   Smokeless Tobacco Never          PHYSICAL EXAM:  58 y.o. female sitting comfortably on exam chair in no apparent distress.   Difficulty rising from a seated position.   Ambulates without normal gait, leaning forward, shuffling gait   Ambulated with use of rolling walker   Loss of coronal balance  Patient is unable to go up on toes and on heels   Patient is  unable to balance on 1 leg   Strength RLE: hip flexion 3/5, knee extension 3/5, knee flexion 4/5 , dorsiflexion 3/5, plantar flexion 4/5  Strength LLE: hip flexion 3/5, knee extension 3/5, knee flexion 4/5 , dorsiflexion 3/5, plantar flexion 4/5  Sensation is intact and equal bilaterally in lower extremities  Reflexes: diminished in bilateral lower extremities  Non-TTP over spinous processes and paraspinal muscles  Heart - regular rate and rhythm. No murmurs, clicks, or gallops appreciated.  Lungs - clear to auscultation in all lung fields   Abdomen - soft and non-tender to palpation. Normoactive bowel sounds in  all 4 quadrants.         RADIOGRAPHIC STUDIES:  X-ray, bilateral hips, 4/5/2023: 2 view normal x-rays of the pelvis and hips.  No evidence of significant SI joint abnormality.  Instrumentation down to the level of S1.  CT, abdomen pelvis, 5/25/2023: Evidence of prior extensive fusion from T8-S1 on the left and T9-S1 on the right side.  There is evidence of prior spinal cord placement at the T8-9 level.  There is also evidence of pseudoarthrosis at the upper part of the construct, T8-9 and possibly T9-10.  Upper thoracic spine were not visualized on the CT scan.  There is evidence of anterior interbody fusion at L2-3, L3-4, L4-5 and L5-S1.  There is clear evidence of pseudoarthrosis at L5-S1 both anteriorly and posteriorly.  Bilateral S1 screws are loose.  There is evidence of subsidence anteriorly with screw loosening and significant neuroforaminal stenosis.  X-rays, lumbar spine, 8/26/2024: Multilevel anterior posterior lumbar fusion with instrumentation.  There is evidence of screw loosening and interbody loosening at L5-S1.  Xrays, scoliosis films, 9/03/2024: Extensive thoracolumbar fusion to the level of S1.  There is evidence of deformity focalized to the L5-S1 level with significant sagittal imbalance.      ASSESSMENT:  1. Scoliosis, unspecified scoliosis type, unspecified spinal region  2. Lumbar spine pain  3. History of lumbar surgery  4. Ambulatory dysfunction  5. Pseudoarthrosis of lumbar spine  6. Lumbar radiculopathy      PLAN:  58 y.o. female with low back pain, lumbar radiculopathy, history of thoracolumbar surgery, and ambulatory dysfunction.     The patient's MRI and x-rays were reviewed.  The patient has failed injections and physical therapy which provided minimal to no pain relief. Surgical intervention was discussed.  There was an in depth conversation had about the procedure that would be performed. It was discussed surgery is warranted to preserve function, will not alleviate pain, and there  "is potential the patient may have an increase in pain following surgery.     Risks of surgery, including but not limited to, anesthesia complications, infection, damage to nerves and blood vessels, blood loss needing a transfusion, blood clots, postoperative stiffness, residual pain, residual weakness, drop foot, dural tearing, adjacent segment disease, and even death were discussed at length.  If the patient has a dural tear, they will be in bed for approximately 32 hours following surgery.      The patient understands the risks and benefits of all mentioned treatment options and has no further questions. The patient has elected to proceed forward with patient procedure.  The plan is to remove the bottom part of hardware, remove the S1 screws and we instrument the sacrum in addition to adding pelvic screws.  Osteotomy will be performed to correct the deformity.  She is aware that the entire deformity cannot be corrected.  She is also aware that in the long run the anterior column may need to be bone grafted and the failed hardware at L5-S1 may need to be removed..  If this per procedure goes well, the loose hardware in the upper thoracic spine will also be removed.    She has been consented for \"Removal of right T7 pedicle screw and resection of the right proximal rudy; Removal hardware L3-S1, posterior osteotomy L5-S1, posterior fusion L5-S1, revision instrumentation L2 to pelvis; application of bilateral S2 alar screws; possible application of iliac bolts; possible bone grafting of the disc at L5-S1; iliac crest bone graft\"    Pre-operative clearance in the form of primary care physician clearance, blood work, EKG, and chest x-ray are required prior to surgical intervention. The patient was informed they may have to go to a short term rehab facility if they do not have support at home following discharge from the hospital.   A consent form was obtained today . Surgery is tentatively scheduled for 12/19/2024. I " will see the patient back in office 2 week post-operatively.        Scribe Attestation      I,:  Casie Vargas PA-C am acting as a scribe while in the presence of the attending physician.:       I,:  Yenni Stevens MD personally performed the services described in this documentation    as scribed in my presence.:

## 2024-12-10 DIAGNOSIS — N17.9 AKI (ACUTE KIDNEY INJURY) (HCC): Primary | ICD-10-CM

## 2024-12-11 ENCOUNTER — APPOINTMENT (OUTPATIENT)
Dept: PREADMISSION TESTING | Facility: HOSPITAL | Age: 59
DRG: 447 | End: 2024-12-11
Payer: COMMERCIAL

## 2024-12-17 ENCOUNTER — ANESTHESIA EVENT (OUTPATIENT)
Dept: PERIOP | Facility: HOSPITAL | Age: 59
DRG: 447 | End: 2024-12-17
Payer: COMMERCIAL

## 2024-12-17 RX ORDER — BISACODYL 10 MG
10 SUPPOSITORY, RECTAL RECTAL AS NEEDED
COMMUNITY

## 2024-12-17 RX ORDER — SODIUM PHOSPHATE, DIBASIC AND SODIUM PHOSPHATE, MONOBASIC 3.5; 9.5 G/66ML; G/66ML
1 ENEMA RECTAL ONCE
COMMUNITY

## 2024-12-17 RX ORDER — ONDANSETRON 4 MG/1
TABLET, FILM COATED ORAL
COMMUNITY
Start: 2024-12-06

## 2024-12-17 RX ORDER — TIRZEPATIDE 2.5 MG/.5ML
2.5 INJECTION, SOLUTION SUBCUTANEOUS WEEKLY
COMMUNITY
Start: 2024-12-05

## 2024-12-17 NOTE — PRE-PROCEDURE INSTRUCTIONS
Pre-Surgery Instructions:   Medication Instructions    acetaminophen (TYLENOL) 325 mg tablet Uses PRN- OK to take day of surgery    aluminum-magnesium hydroxide-simethicone (MAALOX) 2759-1657-193 mg/30 mL suspension Hold day of surgery.    amLODIPine (NORVASC) 5 mg tablet Take day of surgery.    ARIPiprazole (ABILIFY) 10 mg tablet Take day of surgery.    aspirin (Aspirin Low Dose) 81 mg EC tablet Stop taking 5 days prior to surgery.per  from 12/6    atorvastatin (LIPITOR) 40 mg tablet Take night before surgery    bisacodyl (DULCOLAX) 10 mg suppository Hold day of surgery.    busPIRone (BUSPAR) 15 mg tablet Take day of surgery.    Cholecalciferol (VITAMIN D3) 1,000 units tablet Hold day of surgery.    cyclobenzaprine (FLEXERIL) 10 mg tablet Uses PRN- OK to take day of surgery    Diclofenac Sodium (VOLTAREN) 1 % Stop taking 7 days prior to surgery.per  from 12/6    hydrOXYzine HCL (ATARAX) 25 mg tablet Take day of surgery.    loratadine (CLARITIN) 10 mg tablet Uses PRN- OK to take day of surgery    lubiprostone (AMITIZA) 8 mcg capsule Take day of surgery.    magnesium Oxide (MAG-OX) 400 mg TABS Hold day of surgery.    morphine (MSIR) 15 mg tablet Uses PRN- OK to take day of surgery    ondansetron (ZOFRAN) 4 mg tablet Uses PRN- OK to take day of surgery    pantoprazole (PROTONIX) 40 mg tablet Take day of surgery.    prazosin (MINIPRESS) 2 mg capsule Take night before surgery    pregabalin (LYRICA) 150 mg capsule Take day of surgery.    senna-docusate sodium (SENOKOT S) 8.6-50 mg per tablet Hold day of surgery.    sertraline (ZOLOFT) 100 mg tablet Take day of surgery.    sodium phosphate (PEDIA-LAX) 3.5-9.5 g 59 mL enema Hold day of surgery.    tirzepatide (Zepbound) 2.5 mg/0.5 mL auto-injector Instructions provided by La[facility] to start till after surgery per     traZODone (DESYREL) 300 MG tablet Take night before surgery   Medication instructions for day surgery reviewed. Please use only a sip of water to take your  instructed medications. Avoid all over the counter vitamins, supplements and NSAIDS for one week prior to surgery per anesthesia guidelines. Tylenol is ok to take as needed.     You will receive a call one business day prior to surgery with an arrival time and hospital directions. If your surgery is scheduled on a Monday, the hospital will be calling you on the Friday prior to your surgery. If you have not heard from anyone by 8pm, please call the hospital supervisor through the hospital  at [phone]. ([place] [phone] or [place] [phone]).    Do not eat or drink anything after midnight the night before your surgery, including candy, mints, lifesavers, or chewing gum. Do not drink alcohol 24hrs before your surgery. Try not to smoke at least 24hrs before your surgery.       Follow the pre surgery showering instructions as listed in the “My Surgical Experience Booklet” or otherwise provided by your surgeon's office. Do not use a blade to shave the surgical area 1 week before surgery. It is okay to use a clean electric clippers up to 24 hours before surgery. Do not apply any lotions, creams, including makeup, cologne, deodorant, or perfumes after showering on the day of your surgery. Do not use dry shampoo, hair spray, hair gel, or any type of hair products.     No contact lenses, eye make-up, or artificial eyelashes. Remove nail polish, including gel polish, and any artificial, gel, or acrylic nails if possible. Remove all jewelry including rings and body piercing jewelry.     Wear causal clothing that is easy to take on and off. Consider your type of surgery.    Keep any valuables, jewelry, piercings at home. Please bring any specially ordered equipment (sling, braces) if indicated.    Arrange for a responsible person to drive you to and from the hospital on the day of your surgery. Please confirm the visitor policy for the day of your procedure when you receive your phone call with an arrival  time.     Call the surgeon's office with any new illnesses, exposures, or additional questions prior to surgery.    Please reference your “My Surgical Experience Booklet” for additional information to prepare for your upcoming surgery.    FAXED TO FACILITY @  [phone]

## 2024-12-18 ENCOUNTER — TELEPHONE (OUTPATIENT)
Dept: OTHER | Facility: OTHER | Age: 59
End: 2024-12-18

## 2024-12-19 ENCOUNTER — APPOINTMENT (OUTPATIENT)
Dept: RADIOLOGY | Facility: HOSPITAL | Age: 59
DRG: 447 | End: 2024-12-19
Payer: COMMERCIAL

## 2024-12-19 ENCOUNTER — HOSPITAL ENCOUNTER (INPATIENT)
Facility: HOSPITAL | Age: 59
LOS: 5 days | Discharge: NURSING, REHAB OR LONG-TERM CARE | DRG: 447 | End: 2024-12-24
Attending: ORTHOPAEDIC SURGERY | Admitting: ORTHOPAEDIC SURGERY
Payer: COMMERCIAL

## 2024-12-19 ENCOUNTER — ANESTHESIA (OUTPATIENT)
Dept: PERIOP | Facility: HOSPITAL | Age: 59
DRG: 447 | End: 2024-12-19
Payer: COMMERCIAL

## 2024-12-19 DIAGNOSIS — G24.01 TARDIVE DYSKINESIA: Primary | ICD-10-CM

## 2024-12-19 DIAGNOSIS — G47.33 OSA (OBSTRUCTIVE SLEEP APNEA): ICD-10-CM

## 2024-12-19 DIAGNOSIS — K21.9 GERD WITHOUT ESOPHAGITIS: ICD-10-CM

## 2024-12-19 DIAGNOSIS — M48.062 SPINAL STENOSIS OF LUMBAR REGION WITH NEUROGENIC CLAUDICATION: ICD-10-CM

## 2024-12-19 DIAGNOSIS — F41.0 GENERALIZED ANXIETY DISORDER WITH PANIC ATTACKS: ICD-10-CM

## 2024-12-19 DIAGNOSIS — F41.1 GENERALIZED ANXIETY DISORDER WITH PANIC ATTACKS: ICD-10-CM

## 2024-12-19 DIAGNOSIS — I63.9 CEREBROVASCULAR ACCIDENT (CVA), UNSPECIFIED MECHANISM (HCC): ICD-10-CM

## 2024-12-19 DIAGNOSIS — R13.10 DYSPHAGIA, UNSPECIFIED TYPE: ICD-10-CM

## 2024-12-19 DIAGNOSIS — M79.7 FIBROMYALGIA: ICD-10-CM

## 2024-12-19 DIAGNOSIS — E78.2 MIXED HYPERLIPIDEMIA: ICD-10-CM

## 2024-12-19 DIAGNOSIS — F31.60 BIPOLAR AFFECTIVE DISORDER, CURRENT EPISODE MIXED, CURRENT EPISODE SEVERITY UNSPECIFIED (HCC): ICD-10-CM

## 2024-12-19 LAB
ABO GROUP BLD: NORMAL
BASE EXCESS BLDA CALC-SCNC: -1 MMOL/L (ref -2–3)
BASE EXCESS BLDA CALC-SCNC: 0 MMOL/L (ref -2–3)
BASE EXCESS BLDA CALC-SCNC: 0 MMOL/L (ref -2–3)
BLD GP AB SCN SERPL QL: NEGATIVE
CA-I BLD-SCNC: 1.08 MMOL/L (ref 1.12–1.32)
CA-I BLD-SCNC: 1.12 MMOL/L (ref 1.12–1.32)
CA-I BLD-SCNC: 1.13 MMOL/L (ref 1.12–1.32)
GLUCOSE SERPL-MCNC: 112 MG/DL (ref 65–140)
GLUCOSE SERPL-MCNC: 121 MG/DL (ref 65–140)
GLUCOSE SERPL-MCNC: 135 MG/DL (ref 65–140)
GLUCOSE SERPL-MCNC: 174 MG/DL (ref 65–140)
HCO3 BLDA-SCNC: 23.3 MMOL/L (ref 22–28)
HCO3 BLDA-SCNC: 24.3 MMOL/L (ref 22–28)
HCO3 BLDA-SCNC: 24.8 MMOL/L (ref 22–28)
HCT VFR BLD CALC: 33 % (ref 34.8–46.1)
HCT VFR BLD CALC: 36 % (ref 34.8–46.1)
HCT VFR BLD CALC: 37 % (ref 34.8–46.1)
HGB BLDA-MCNC: 11.2 G/DL (ref 11.5–15.4)
HGB BLDA-MCNC: 12.2 G/DL (ref 11.5–15.4)
HGB BLDA-MCNC: 12.6 G/DL (ref 11.5–15.4)
PCO2 BLD: 24 MMOL/L (ref 21–32)
PCO2 BLD: 25 MMOL/L (ref 21–32)
PCO2 BLD: 26 MMOL/L (ref 21–32)
PCO2 BLD: 35.9 MM HG (ref 36–44)
PCO2 BLD: 36.8 MM HG (ref 36–44)
PCO2 BLD: 40.7 MM HG (ref 36–44)
PH BLD: 7.39 [PH] (ref 7.35–7.45)
PH BLD: 7.42 [PH] (ref 7.35–7.45)
PH BLD: 7.43 [PH] (ref 7.35–7.45)
PO2 BLD: 111 MM HG (ref 75–129)
PO2 BLD: 194 MM HG (ref 75–129)
PO2 BLD: 90 MM HG (ref 75–129)
POTASSIUM BLD-SCNC: 3.2 MMOL/L (ref 3.5–5.3)
POTASSIUM BLD-SCNC: 3.5 MMOL/L (ref 3.5–5.3)
POTASSIUM BLD-SCNC: 3.6 MMOL/L (ref 3.5–5.3)
RH BLD: NEGATIVE
SAO2 % BLD FROM PO2: 100 % (ref 60–85)
SAO2 % BLD FROM PO2: 97 % (ref 60–85)
SAO2 % BLD FROM PO2: 98 % (ref 60–85)
SARS-COV-2 RNA RESP QL NAA+PROBE: NEGATIVE
SODIUM BLD-SCNC: 142 MMOL/L (ref 136–145)
SODIUM BLD-SCNC: 145 MMOL/L (ref 136–145)
SODIUM BLD-SCNC: 146 MMOL/L (ref 136–145)
SPECIMEN EXPIRATION DATE: NORMAL
SPECIMEN SOURCE: ABNORMAL

## 2024-12-19 PROCEDURE — 0SG1071 FUSION OF 2 OR MORE LUMBAR VERTEBRAL JOINTS WITH AUTOLOGOUS TISSUE SUBSTITUTE, POSTERIOR APPROACH, POSTERIOR COLUMN, OPEN APPROACH: ICD-10-PCS | Performed by: ORTHOPAEDIC SURGERY

## 2024-12-19 PROCEDURE — 84132 ASSAY OF SERUM POTASSIUM: CPT

## 2024-12-19 PROCEDURE — 0SP304Z REMOVAL OF INTERNAL FIXATION DEVICE FROM LUMBOSACRAL JOINT, OPEN APPROACH: ICD-10-PCS | Performed by: ORTHOPAEDIC SURGERY

## 2024-12-19 PROCEDURE — 85014 HEMATOCRIT: CPT

## 2024-12-19 PROCEDURE — C1713 ANCHOR/SCREW BN/BN,TIS/BN: HCPCS | Performed by: ORTHOPAEDIC SURGERY

## 2024-12-19 PROCEDURE — 82803 BLOOD GASES ANY COMBINATION: CPT

## 2024-12-19 PROCEDURE — 84295 ASSAY OF SERUM SODIUM: CPT

## 2024-12-19 PROCEDURE — 0SH004Z INSERTION OF INTERNAL FIXATION DEVICE INTO LUMBAR VERTEBRAL JOINT, OPEN APPROACH: ICD-10-PCS | Performed by: ORTHOPAEDIC SURGERY

## 2024-12-19 PROCEDURE — 0SP004Z REMOVAL OF INTERNAL FIXATION DEVICE FROM LUMBAR VERTEBRAL JOINT, OPEN APPROACH: ICD-10-PCS | Performed by: ORTHOPAEDIC SURGERY

## 2024-12-19 PROCEDURE — 86920 COMPATIBILITY TEST SPIN: CPT

## 2024-12-19 PROCEDURE — 72100 X-RAY EXAM L-S SPINE 2/3 VWS: CPT

## 2024-12-19 PROCEDURE — 0SG3071 FUSION OF LUMBOSACRAL JOINT WITH AUTOLOGOUS TISSUE SUBSTITUTE, POSTERIOR APPROACH, POSTERIOR COLUMN, OPEN APPROACH: ICD-10-PCS | Performed by: ORTHOPAEDIC SURGERY

## 2024-12-19 PROCEDURE — 99222 1ST HOSP IP/OBS MODERATE 55: CPT | Performed by: INTERNAL MEDICINE

## 2024-12-19 PROCEDURE — 87635 SARS-COV-2 COVID-19 AMP PRB: CPT | Performed by: ORTHOPAEDIC SURGERY

## 2024-12-19 PROCEDURE — 4A11X4G MONITORING OF PERIPHERAL NERVOUS ELECTRICAL ACTIVITY, INTRAOPERATIVE, EXTERNAL APPROACH: ICD-10-PCS | Performed by: ORTHOPAEDIC SURGERY

## 2024-12-19 PROCEDURE — 82948 REAGENT STRIP/BLOOD GLUCOSE: CPT

## 2024-12-19 PROCEDURE — 0SH304Z INSERTION OF INTERNAL FIXATION DEVICE INTO LUMBOSACRAL JOINT, OPEN APPROACH: ICD-10-PCS | Performed by: ORTHOPAEDIC SURGERY

## 2024-12-19 PROCEDURE — 86900 BLOOD TYPING SEROLOGIC ABO: CPT | Performed by: ORTHOPAEDIC SURGERY

## 2024-12-19 PROCEDURE — 82947 ASSAY GLUCOSE BLOOD QUANT: CPT

## 2024-12-19 PROCEDURE — 8E0WXBZ COMPUTER ASSISTED PROCEDURE OF TRUNK REGION: ICD-10-PCS | Performed by: ORTHOPAEDIC SURGERY

## 2024-12-19 PROCEDURE — 82330 ASSAY OF CALCIUM: CPT

## 2024-12-19 PROCEDURE — 86850 RBC ANTIBODY SCREEN: CPT | Performed by: ORTHOPAEDIC SURGERY

## 2024-12-19 PROCEDURE — 86901 BLOOD TYPING SEROLOGIC RH(D): CPT | Performed by: ORTHOPAEDIC SURGERY

## 2024-12-19 DEVICE — CONNECTOR 779125555 TI DOM CL 5.5 CL 5.5
Type: IMPLANTABLE DEVICE | Site: SPINE LUMBAR | Status: FUNCTIONAL
Brand: CD HORIZON® SPINAL SYSTEM

## 2024-12-19 DEVICE — GRAFT BONE CRUSHED CANC CHIPS 0.1-4MM 30ML FREEZE DRIED: Type: IMPLANTABLE DEVICE | Site: SPINE LUMBAR | Status: FUNCTIONAL

## 2024-12-19 DEVICE — DBM T42280 8MMX1CMX20CM 2 EACH GRAFTON M
Type: IMPLANTABLE DEVICE | Site: SPINE LUMBAR | Status: FUNCTIONAL
Brand: GRAFTON®AND GRAFTON PLUS®DEMINERALIZED BONE MATRIX (DBM)

## 2024-12-19 RX ORDER — HYDROXYZINE HYDROCHLORIDE 25 MG/1
25 TABLET, FILM COATED ORAL 3 TIMES DAILY
Status: DISCONTINUED | OUTPATIENT
Start: 2024-12-19 | End: 2024-12-24 | Stop reason: HOSPADM

## 2024-12-19 RX ORDER — SODIUM CHLORIDE 9 MG/ML
INJECTION, SOLUTION INTRAVENOUS CONTINUOUS PRN
Status: DISCONTINUED | OUTPATIENT
Start: 2024-12-19 | End: 2024-12-19

## 2024-12-19 RX ORDER — TRAZODONE HYDROCHLORIDE 100 MG/1
300 TABLET ORAL
Status: DISCONTINUED | OUTPATIENT
Start: 2024-12-19 | End: 2024-12-24 | Stop reason: HOSPADM

## 2024-12-19 RX ORDER — CEFAZOLIN SODIUM 1 G/3ML
INJECTION, POWDER, FOR SOLUTION INTRAMUSCULAR; INTRAVENOUS AS NEEDED
Status: DISCONTINUED | OUTPATIENT
Start: 2024-12-19 | End: 2024-12-19

## 2024-12-19 RX ORDER — HYDROMORPHONE HCL/PF 1 MG/ML
0.5 SYRINGE (ML) INJECTION
Status: COMPLETED | OUTPATIENT
Start: 2024-12-19 | End: 2024-12-19

## 2024-12-19 RX ORDER — HYDROMORPHONE HCL/PF 1 MG/ML
SYRINGE (ML) INJECTION AS NEEDED
Status: DISCONTINUED | OUTPATIENT
Start: 2024-12-19 | End: 2024-12-19

## 2024-12-19 RX ORDER — MAGNESIUM HYDROXIDE/ALUMINUM HYDROXICE/SIMETHICONE 120; 1200; 1200 MG/30ML; MG/30ML; MG/30ML
30 SUSPENSION ORAL EVERY 6 HOURS PRN
Status: DISCONTINUED | OUTPATIENT
Start: 2024-12-19 | End: 2024-12-24 | Stop reason: HOSPADM

## 2024-12-19 RX ORDER — AMLODIPINE BESYLATE 5 MG/1
5 TABLET ORAL DAILY
Status: DISCONTINUED | OUTPATIENT
Start: 2024-12-20 | End: 2024-12-24 | Stop reason: HOSPADM

## 2024-12-19 RX ORDER — PREGABALIN 75 MG/1
150 CAPSULE ORAL 3 TIMES DAILY
Status: DISCONTINUED | OUTPATIENT
Start: 2024-12-19 | End: 2024-12-24 | Stop reason: HOSPADM

## 2024-12-19 RX ORDER — ACETAMINOPHEN 325 MG/1
650 TABLET ORAL EVERY 6 HOURS SCHEDULED
Status: DISCONTINUED | OUTPATIENT
Start: 2024-12-19 | End: 2024-12-24 | Stop reason: HOSPADM

## 2024-12-19 RX ORDER — CALCIUM CARBONATE 500 MG/1
1000 TABLET, CHEWABLE ORAL DAILY PRN
Status: DISCONTINUED | OUTPATIENT
Start: 2024-12-19 | End: 2024-12-24 | Stop reason: HOSPADM

## 2024-12-19 RX ORDER — MAGNESIUM HYDROXIDE 1200 MG/15ML
LIQUID ORAL AS NEEDED
Status: DISCONTINUED | OUTPATIENT
Start: 2024-12-19 | End: 2024-12-19 | Stop reason: HOSPADM

## 2024-12-19 RX ORDER — OXYCODONE HYDROCHLORIDE 5 MG/1
10 TABLET ORAL EVERY 4 HOURS PRN
Refills: 0 | Status: DISCONTINUED | OUTPATIENT
Start: 2024-12-19 | End: 2024-12-24 | Stop reason: HOSPADM

## 2024-12-19 RX ORDER — BUPIVACAINE HYDROCHLORIDE AND EPINEPHRINE 2.5; 5 MG/ML; UG/ML
INJECTION, SOLUTION EPIDURAL; INFILTRATION; INTRACAUDAL; PERINEURAL AS NEEDED
Status: DISCONTINUED | OUTPATIENT
Start: 2024-12-19 | End: 2024-12-19 | Stop reason: HOSPADM

## 2024-12-19 RX ORDER — LIDOCAINE HYDROCHLORIDE 10 MG/ML
INJECTION, SOLUTION EPIDURAL; INFILTRATION; INTRACAUDAL; PERINEURAL AS NEEDED
Status: DISCONTINUED | OUTPATIENT
Start: 2024-12-19 | End: 2024-12-19

## 2024-12-19 RX ORDER — HYDROMORPHONE HCL/PF 1 MG/ML
0.5 SYRINGE (ML) INJECTION
Status: DISCONTINUED | OUTPATIENT
Start: 2024-12-19 | End: 2024-12-19

## 2024-12-19 RX ORDER — ARIPIPRAZOLE 10 MG/1
10 TABLET ORAL DAILY
Status: DISCONTINUED | OUTPATIENT
Start: 2024-12-20 | End: 2024-12-24 | Stop reason: HOSPADM

## 2024-12-19 RX ORDER — ONDANSETRON 2 MG/ML
4 INJECTION INTRAMUSCULAR; INTRAVENOUS EVERY 6 HOURS PRN
Status: DISCONTINUED | OUTPATIENT
Start: 2024-12-19 | End: 2024-12-24 | Stop reason: HOSPADM

## 2024-12-19 RX ORDER — FENTANYL CITRATE 50 UG/ML
INJECTION, SOLUTION INTRAMUSCULAR; INTRAVENOUS AS NEEDED
Status: DISCONTINUED | OUTPATIENT
Start: 2024-12-19 | End: 2024-12-19

## 2024-12-19 RX ORDER — CHLORHEXIDINE GLUCONATE ORAL RINSE 1.2 MG/ML
15 SOLUTION DENTAL ONCE
Status: COMPLETED | OUTPATIENT
Start: 2024-12-19 | End: 2024-12-19

## 2024-12-19 RX ORDER — SODIUM CHLORIDE, SODIUM LACTATE, POTASSIUM CHLORIDE, CALCIUM CHLORIDE 600; 310; 30; 20 MG/100ML; MG/100ML; MG/100ML; MG/100ML
INJECTION, SOLUTION INTRAVENOUS CONTINUOUS PRN
Status: DISCONTINUED | OUTPATIENT
Start: 2024-12-19 | End: 2024-12-19

## 2024-12-19 RX ORDER — PROPOFOL 10 MG/ML
INJECTION, EMULSION INTRAVENOUS AS NEEDED
Status: DISCONTINUED | OUTPATIENT
Start: 2024-12-19 | End: 2024-12-19

## 2024-12-19 RX ORDER — GLYCOPYRROLATE 0.2 MG/ML
INJECTION INTRAMUSCULAR; INTRAVENOUS AS NEEDED
Status: DISCONTINUED | OUTPATIENT
Start: 2024-12-19 | End: 2024-12-19

## 2024-12-19 RX ORDER — HEPARIN SODIUM 5000 [USP'U]/ML
5000 INJECTION, SOLUTION INTRAVENOUS; SUBCUTANEOUS EVERY 8 HOURS SCHEDULED
Status: DISCONTINUED | OUTPATIENT
Start: 2024-12-20 | End: 2024-12-24 | Stop reason: HOSPADM

## 2024-12-19 RX ORDER — DOCUSATE SODIUM 100 MG/1
100 CAPSULE, LIQUID FILLED ORAL 2 TIMES DAILY
Status: DISCONTINUED | OUTPATIENT
Start: 2024-12-20 | End: 2024-12-24 | Stop reason: HOSPADM

## 2024-12-19 RX ORDER — FENTANYL CITRATE/PF 50 MCG/ML
50 SYRINGE (ML) INJECTION
Status: COMPLETED | OUTPATIENT
Start: 2024-12-19 | End: 2024-12-19

## 2024-12-19 RX ORDER — SODIUM CHLORIDE, SODIUM LACTATE, POTASSIUM CHLORIDE, CALCIUM CHLORIDE 600; 310; 30; 20 MG/100ML; MG/100ML; MG/100ML; MG/100ML
50 INJECTION, SOLUTION INTRAVENOUS CONTINUOUS
Status: DISCONTINUED | OUTPATIENT
Start: 2024-12-19 | End: 2024-12-23

## 2024-12-19 RX ORDER — KETAMINE HCL IN NACL, ISO-OSM 100MG/10ML
SYRINGE (ML) INJECTION AS NEEDED
Status: DISCONTINUED | OUTPATIENT
Start: 2024-12-19 | End: 2024-12-19

## 2024-12-19 RX ORDER — CEFAZOLIN SODIUM 2 G/50ML
2000 SOLUTION INTRAVENOUS ONCE
Status: COMPLETED | OUTPATIENT
Start: 2024-12-19 | End: 2024-12-19

## 2024-12-19 RX ORDER — METOCLOPRAMIDE HYDROCHLORIDE 5 MG/ML
10 INJECTION INTRAMUSCULAR; INTRAVENOUS ONCE AS NEEDED
Status: DISCONTINUED | OUTPATIENT
Start: 2024-12-19 | End: 2024-12-19 | Stop reason: HOSPADM

## 2024-12-19 RX ORDER — ATORVASTATIN CALCIUM 40 MG/1
40 TABLET, FILM COATED ORAL
Status: DISCONTINUED | OUTPATIENT
Start: 2024-12-19 | End: 2024-12-24 | Stop reason: HOSPADM

## 2024-12-19 RX ORDER — FENTANYL CITRATE/PF 50 MCG/ML
50 SYRINGE (ML) INJECTION
Refills: 0 | Status: DISCONTINUED | OUTPATIENT
Start: 2024-12-19 | End: 2024-12-19 | Stop reason: HOSPADM

## 2024-12-19 RX ORDER — CEFAZOLIN SODIUM 2 G/50ML
2000 SOLUTION INTRAVENOUS EVERY 8 HOURS
Status: COMPLETED | OUTPATIENT
Start: 2024-12-19 | End: 2024-12-20

## 2024-12-19 RX ORDER — PANTOPRAZOLE SODIUM 40 MG/1
40 TABLET, DELAYED RELEASE ORAL
Status: DISCONTINUED | OUTPATIENT
Start: 2024-12-20 | End: 2024-12-24 | Stop reason: HOSPADM

## 2024-12-19 RX ORDER — METHOCARBAMOL 500 MG/1
500 TABLET, FILM COATED ORAL EVERY 6 HOURS SCHEDULED
Status: DISCONTINUED | OUTPATIENT
Start: 2024-12-19 | End: 2024-12-24 | Stop reason: HOSPADM

## 2024-12-19 RX ORDER — OXYCODONE HYDROCHLORIDE 5 MG/1
5 TABLET ORAL EVERY 4 HOURS PRN
Refills: 0 | Status: DISCONTINUED | OUTPATIENT
Start: 2024-12-19 | End: 2024-12-24 | Stop reason: HOSPADM

## 2024-12-19 RX ORDER — DIPHENHYDRAMINE HYDROCHLORIDE 50 MG/ML
12.5 INJECTION INTRAMUSCULAR; INTRAVENOUS ONCE AS NEEDED
Status: DISCONTINUED | OUTPATIENT
Start: 2024-12-19 | End: 2024-12-19 | Stop reason: HOSPADM

## 2024-12-19 RX ORDER — ONDANSETRON 2 MG/ML
INJECTION INTRAMUSCULAR; INTRAVENOUS AS NEEDED
Status: DISCONTINUED | OUTPATIENT
Start: 2024-12-19 | End: 2024-12-19

## 2024-12-19 RX ORDER — VANCOMYCIN HYDROCHLORIDE 1 G/20ML
INJECTION, POWDER, LYOPHILIZED, FOR SOLUTION INTRAVENOUS AS NEEDED
Status: DISCONTINUED | OUTPATIENT
Start: 2024-12-19 | End: 2024-12-19 | Stop reason: HOSPADM

## 2024-12-19 RX ORDER — LUBIPROSTONE 8 UG/1
8 CAPSULE ORAL 2 TIMES DAILY
Status: DISCONTINUED | OUTPATIENT
Start: 2024-12-19 | End: 2024-12-24 | Stop reason: HOSPADM

## 2024-12-19 RX ORDER — MIDAZOLAM HYDROCHLORIDE 2 MG/2ML
INJECTION, SOLUTION INTRAMUSCULAR; INTRAVENOUS AS NEEDED
Status: DISCONTINUED | OUTPATIENT
Start: 2024-12-19 | End: 2024-12-19

## 2024-12-19 RX ORDER — SERTRALINE HYDROCHLORIDE 100 MG/1
200 TABLET, FILM COATED ORAL
Status: DISCONTINUED | OUTPATIENT
Start: 2024-12-19 | End: 2024-12-24 | Stop reason: HOSPADM

## 2024-12-19 RX ORDER — SODIUM CHLORIDE, SODIUM LACTATE, POTASSIUM CHLORIDE, CALCIUM CHLORIDE 600; 310; 30; 20 MG/100ML; MG/100ML; MG/100ML; MG/100ML
125 INJECTION, SOLUTION INTRAVENOUS CONTINUOUS
Status: DISCONTINUED | OUTPATIENT
Start: 2024-12-19 | End: 2024-12-19

## 2024-12-19 RX ORDER — PRAZOSIN HYDROCHLORIDE 2 MG/1
2 CAPSULE ORAL
Status: DISCONTINUED | OUTPATIENT
Start: 2024-12-19 | End: 2024-12-24 | Stop reason: HOSPADM

## 2024-12-19 RX ORDER — DEXAMETHASONE SODIUM PHOSPHATE 10 MG/ML
INJECTION, SOLUTION INTRAMUSCULAR; INTRAVENOUS AS NEEDED
Status: DISCONTINUED | OUTPATIENT
Start: 2024-12-19 | End: 2024-12-19

## 2024-12-19 RX ORDER — SUCCINYLCHOLINE/SOD CL,ISO/PF 100 MG/5ML
SYRINGE (ML) INTRAVENOUS AS NEEDED
Status: DISCONTINUED | OUTPATIENT
Start: 2024-12-19 | End: 2024-12-19

## 2024-12-19 RX ORDER — PROPOFOL 10 MG/ML
INJECTION, EMULSION INTRAVENOUS CONTINUOUS PRN
Status: DISCONTINUED | OUTPATIENT
Start: 2024-12-19 | End: 2024-12-19

## 2024-12-19 RX ORDER — SENNOSIDES 8.6 MG
1 TABLET ORAL DAILY
Status: DISCONTINUED | OUTPATIENT
Start: 2024-12-20 | End: 2024-12-24 | Stop reason: HOSPADM

## 2024-12-19 RX ADMIN — PHENYLEPHRINE HYDROCHLORIDE 20 MCG/MIN: 10 INJECTION INTRAVENOUS at 07:59

## 2024-12-19 RX ADMIN — SODIUM CHLORIDE, SODIUM LACTATE, POTASSIUM CHLORIDE, AND CALCIUM CHLORIDE: .6; .31; .03; .02 INJECTION, SOLUTION INTRAVENOUS at 07:44

## 2024-12-19 RX ADMIN — FENTANYL CITRATE 50 MCG: 50 INJECTION INTRAMUSCULAR; INTRAVENOUS at 13:08

## 2024-12-19 RX ADMIN — Medication 10 MG: at 09:17

## 2024-12-19 RX ADMIN — Medication 10 MG: at 10:17

## 2024-12-19 RX ADMIN — Medication: at 16:15

## 2024-12-19 RX ADMIN — ONDANSETRON 4 MG: 2 INJECTION INTRAMUSCULAR; INTRAVENOUS at 07:44

## 2024-12-19 RX ADMIN — ATORVASTATIN CALCIUM 40 MG: 40 TABLET, FILM COATED ORAL at 20:55

## 2024-12-19 RX ADMIN — FENTANYL CITRATE 50 MCG: 50 INJECTION INTRAMUSCULAR; INTRAVENOUS at 12:36

## 2024-12-19 RX ADMIN — HYDROMORPHONE HYDROCHLORIDE 0.5 MG: 1 INJECTION, SOLUTION INTRAMUSCULAR; INTRAVENOUS; SUBCUTANEOUS at 12:01

## 2024-12-19 RX ADMIN — HYDROXYZINE HYDROCHLORIDE 25 MG: 25 TABLET, FILM COATED ORAL at 20:54

## 2024-12-19 RX ADMIN — HYDROMORPHONE HYDROCHLORIDE 0.5 MG: 1 INJECTION, SOLUTION INTRAMUSCULAR; INTRAVENOUS; SUBCUTANEOUS at 15:10

## 2024-12-19 RX ADMIN — METHOCARBAMOL 500 MG: 500 TABLET ORAL at 20:55

## 2024-12-19 RX ADMIN — DEXAMETHASONE SODIUM PHOSPHATE 10 MG: 10 INJECTION, SOLUTION INTRAMUSCULAR; INTRAVENOUS at 07:48

## 2024-12-19 RX ADMIN — Medication 5 MG: at 07:59

## 2024-12-19 RX ADMIN — Medication 80 MG: at 07:48

## 2024-12-19 RX ADMIN — FENTANYL CITRATE 50 MCG: 50 INJECTION INTRAMUSCULAR; INTRAVENOUS at 07:48

## 2024-12-19 RX ADMIN — FENTANYL CITRATE 50 MCG: 50 INJECTION INTRAMUSCULAR; INTRAVENOUS at 13:20

## 2024-12-19 RX ADMIN — MIDAZOLAM 2 MG: 1 INJECTION INTRAMUSCULAR; INTRAVENOUS at 07:44

## 2024-12-19 RX ADMIN — SODIUM CHLORIDE: 0.9 INJECTION, SOLUTION INTRAVENOUS at 11:08

## 2024-12-19 RX ADMIN — FENTANYL CITRATE 50 MCG: 50 INJECTION INTRAMUSCULAR; INTRAVENOUS at 14:09

## 2024-12-19 RX ADMIN — LIDOCAINE HYDROCHLORIDE 50 MG: 10 INJECTION, SOLUTION EPIDURAL; INFILTRATION; INTRACAUDAL; PERINEURAL at 07:48

## 2024-12-19 RX ADMIN — CHLORHEXIDINE GLUCONATE 0.12% ORAL RINSE 15 ML: 1.2 LIQUID ORAL at 06:10

## 2024-12-19 RX ADMIN — PREGABALIN 150 MG: 75 CAPSULE ORAL at 20:54

## 2024-12-19 RX ADMIN — PROPOFOL 120 MCG/KG/MIN: 10 INJECTION, EMULSION INTRAVENOUS at 07:54

## 2024-12-19 RX ADMIN — FENTANYL CITRATE 50 MCG: 50 INJECTION INTRAMUSCULAR; INTRAVENOUS at 16:30

## 2024-12-19 RX ADMIN — LUBIPROSTONE 8 MCG: 8 CAPSULE ORAL at 21:30

## 2024-12-19 RX ADMIN — FENTANYL CITRATE 50 MCG: 50 INJECTION INTRAMUSCULAR; INTRAVENOUS at 16:00

## 2024-12-19 RX ADMIN — Medication 5 MG: at 08:03

## 2024-12-19 RX ADMIN — SODIUM CHLORIDE: 0.9 INJECTION, SOLUTION INTRAVENOUS at 07:50

## 2024-12-19 RX ADMIN — SODIUM CHLORIDE, SODIUM LACTATE, POTASSIUM CHLORIDE, AND CALCIUM CHLORIDE: .6; .31; .03; .02 INJECTION, SOLUTION INTRAVENOUS at 09:14

## 2024-12-19 RX ADMIN — SODIUM CHLORIDE, SODIUM LACTATE, POTASSIUM CHLORIDE, AND CALCIUM CHLORIDE 50 ML/HR: .6; .31; .03; .02 INJECTION, SOLUTION INTRAVENOUS at 16:15

## 2024-12-19 RX ADMIN — CEFAZOLIN SODIUM 2000 MG: 2 SOLUTION INTRAVENOUS at 08:42

## 2024-12-19 RX ADMIN — BUSPIRONE HYDROCHLORIDE 15 MG: 10 TABLET ORAL at 20:55

## 2024-12-19 RX ADMIN — ACETAMINOPHEN 650 MG: 325 TABLET, FILM COATED ORAL at 20:55

## 2024-12-19 RX ADMIN — Medication 30 MG: at 07:48

## 2024-12-19 RX ADMIN — CEFAZOLIN 2000 MG: 1 INJECTION, POWDER, FOR SOLUTION INTRAMUSCULAR; INTRAVENOUS at 12:13

## 2024-12-19 RX ADMIN — PRAZOSIN HYDROCHLORIDE 2 MG: 2 CAPSULE ORAL at 21:30

## 2024-12-19 RX ADMIN — REMIFENTANIL HYDROCHLORIDE 0.2 MCG/KG/MIN: 1 INJECTION, POWDER, LYOPHILIZED, FOR SOLUTION INTRAVENOUS at 07:54

## 2024-12-19 RX ADMIN — GLYCOPYRROLATE 0.1 MG: 0.2 INJECTION, SOLUTION INTRAMUSCULAR; INTRAVENOUS at 08:16

## 2024-12-19 RX ADMIN — PROPOFOL 130 MG: 10 INJECTION, EMULSION INTRAVENOUS at 07:48

## 2024-12-19 RX ADMIN — TRAZODONE HYDROCHLORIDE 300 MG: 100 TABLET ORAL at 21:29

## 2024-12-19 RX ADMIN — FENTANYL CITRATE 50 MCG: 50 INJECTION INTRAMUSCULAR; INTRAVENOUS at 14:55

## 2024-12-19 RX ADMIN — CEFAZOLIN SODIUM 2000 MG: 2 SOLUTION INTRAVENOUS at 20:58

## 2024-12-19 RX ADMIN — HYDROMORPHONE HYDROCHLORIDE 0.5 MG: 1 INJECTION, SOLUTION INTRAMUSCULAR; INTRAVENOUS; SUBCUTANEOUS at 09:11

## 2024-12-19 RX ADMIN — HYDROMORPHONE HYDROCHLORIDE 0.5 MG: 1 INJECTION, SOLUTION INTRAMUSCULAR; INTRAVENOUS; SUBCUTANEOUS at 15:35

## 2024-12-19 RX ADMIN — ONDANSETRON 4 MG: 2 INJECTION INTRAMUSCULAR; INTRAVENOUS at 12:42

## 2024-12-19 RX ADMIN — HYDROMORPHONE HYDROCHLORIDE 0.5 MG: 1 INJECTION, SOLUTION INTRAMUSCULAR; INTRAVENOUS; SUBCUTANEOUS at 12:49

## 2024-12-19 RX ADMIN — SERTRALINE 200 MG: 100 TABLET, FILM COATED ORAL at 21:29

## 2024-12-19 NOTE — INTERVAL H&P NOTE
H&P reviewed. After examining the patient I find no changes in the patients condition since the H&P had been written.    Vitals:    12/19/24 0601   BP: 149/93   Pulse: 71   Resp: 20   Temp: (!) 96.9 °F (36.1 °C)   SpO2: 97%

## 2024-12-19 NOTE — ANESTHESIA PROCEDURE NOTES
"Arterial Line Insertion    Performed by: Shweta Kasper CRNA  Authorized by: Yfn Mcgraw MD  Consent: Verbal consent obtained. Written consent obtained.  Risks and benefits: risks, benefits and alternatives were discussed  Consent given by: patient  Patient understanding: patient states understanding of the procedure being performed  Patient consent: the patient's understanding of the procedure matches consent given  Procedure consent: procedure consent matches procedure scheduled  Relevant documents: relevant documents present and verified  Test results: test results available and properly labeled  Site marked: the operative site was marked  Radiology Images: Radiology Images displayed and confirmed. If images not available, report reviewed  Required items: required blood products, implants, devices, and special equipment available  Patient identity confirmed: arm band  Time out: Immediately prior to procedure a \"time out\" was called to verify the correct patient, procedure, equipment, support staff and site/side marked as required.  Preparation: Patient was prepped and draped in the usual sterile fashion.  Indications: hemodynamic monitoring  Orientation:  Left  Location: radial artery  Sedation:  Patient sedated: GETA.    Procedure Details:  Chris's test normal: yes  Needle gauge: 20  Number of attempts: 2    Post-procedure:  Post-procedure: dressing applied  Waveform: good waveform  Post-procedure CNS: unchanged  Patient tolerance: Patient tolerated the procedure well with no immediate complications  Comments: First attempt SRNA with ultrasound          "

## 2024-12-19 NOTE — ANESTHESIA POSTPROCEDURE EVALUATION
Post-Op Assessment Note    CV Status:  Stable    Pain management: adequate    Multimodal analgesia used between 6 hours prior to anesthesia start to PACU discharge    Mental Status:  Alert   Hydration Status:  Stable   PONV Controlled:  None   Airway Patency:  Patent  Two or more mitigation strategies used for obstructive sleep apnea   Post Op Vitals Reviewed: Yes    No anethesia notable event occurred.    Staff: Anesthesiologist           Last Filed PACU Vitals:  Vitals Value Taken Time   Temp 98.3 °F (36.8 °C) 12/19/24 1337   Pulse 98 12/19/24 1558   /73 12/19/24 1546   Resp 19 12/19/24 1558   SpO2 99 % 12/19/24 1558   Vitals shown include unfiled device data.    Modified Gala:  Activity: 1 (12/19/2024  2:00 PM)  Respiration: 2 (12/19/2024  2:00 PM)  Circulation: 2 (12/19/2024  2:00 PM)  Consciousness: 1 (12/19/2024  2:00 PM)  Oxygen Saturation: 2 (12/19/2024  2:00 PM)  Modified Gala Score: 8 (12/19/2024  2:00 PM)      Patient slow to emerge from anesthesia and confused in PACU intially. I was called to patient's bedside due to abnormal tongue movements. In my assessment this is congruent with her history of tardive dyskinesia. Patient does tell us her name and states she is in pain. Improvement in symptoms with combination of dilaudid and fentanyl. Surgical team and APS aware.

## 2024-12-19 NOTE — ANESTHESIA PREPROCEDURE EVALUATION
Procedure:  Removal of right T7 pedicle screw and resection of the right proximal rudy; Removal hardware L3-S1, posterior osteotomy L5-S1, posterior fusion L5-S1, revision instrumentation L2 to pelvis; application of bilateral S2 alar screws; possible application of iliac bolts; possible bone grafting of the disc at L5-S1; iliac crest bone graft (Spine Lumbar)    Relevant Problems   CARDIO   (+) Essential hypertension   (+) Migraine   (+) Mixed hyperlipidemia      GI/HEPATIC   (+) Dysphagia   (+) GERD without esophagitis      MUSCULOSKELETAL   (+) Chronic low back pain, unspecified back pain laterality, unspecified whether sciatica present   (+) Fibromyalgia   (+) Lumbar spondylosis      NEURO/PSYCH   (+) CVA (cerebral vascular accident) (HCC)   (+) Chronic low back pain, unspecified back pain laterality, unspecified whether sciatica present   (+) Chronic pain disorder   (+) Fibromyalgia   (+) Generalized anxiety disorder with panic attacks   (+) Migraine   (+) PTSD (post-traumatic stress disorder)   (+) Panic disorder without agoraphobia   (+) Severe episode of recurrent major depressive disorder, without psychotic features (HCC)      PULMONARY   (+) MIMI (obstructive sleep apnea)        Physical Exam    Airway    Mallampati score: II  TM Distance: >3 FB  Neck ROM: full     Dental       Cardiovascular      Pulmonary      Other Findings  post-pubertal.      Anesthesia Plan  ASA Score- 3     Anesthesia Type- general with ASA Monitors.         Additional Monitors: arterial line.    Airway Plan: ETT.    Comment: Prone. Multimodal pain plan.       Plan Factors-Exercise tolerance (METS): <4 METS.    Chart reviewed.        Patient is not a current smoker.  Patient did not smoke on day of surgery.    Obstructive sleep apnea risk education given perioperatively.        Induction- intravenous.    Postoperative Plan- Plan for postoperative opioid use. Planned trial extubation    Perioperative Resuscitation Plan - Level 1 - Full  Code.       Informed Consent- Anesthetic plan and risks discussed with patient.  I personally reviewed this patient with the CRNA. Discussed and agreed on the Anesthesia Plan with the CRNA..    Anesthesia plan and consent discussed with Samantha who expressed understanding and agreement. Risks/benefits and alternatives discussed with patient including possible PONV, sore throat, damage to teeth/lips/gums and possibility of rare anesthetic and surgical emergencies.

## 2024-12-19 NOTE — ANESTHESIA POSTPROCEDURE EVALUATION
Post-Op Assessment Note    CV Status:  Stable  Pain Score: 0    Pain management: adequate       Mental Status:  Sleepy and arousable   Hydration Status:  Euvolemic   PONV Controlled:  Controlled   Airway Patency:  Patent     Post Op Vitals Reviewed: Yes    No anethesia notable event occurred.    Staff: CRNA, Anesthesiologist           Last Filed PACU Vitals:  Vitals Value Taken Time   Temp 98.3 °F (36.8 °C) 12/19/24 1337   Pulse 104 12/19/24 1341   /79 12/19/24 1338   Resp 10 12/19/24 1341   SpO2 99 % 12/19/24 1341   Vitals shown include unfiled device data.    Modified Gala:  No data recorded

## 2024-12-19 NOTE — TELEPHONE ENCOUNTER
Pt reached answering service, she states she did not get a call yet regarding surgery time. Advised time on appt desk of 730am with arrival 1 hr prior.

## 2024-12-19 NOTE — INTERIM OP NOTE
Removal hardware L2-S1, posterior fusion L5-S1, navigated revision instrumentation L2 to pelvis; application of bilateral S2 alar screws and bone grafting of the disc at L5-S1  Postoperative Note  PATIENT NAME: Samantha Rodriguez  : 1965  MRN: [id number]  [facility] OR ROOM 18    Surgery Date: 2024    Preop Diagnosis:  Scoliosis, unspecified scoliosis type, unspecified spinal region [M41.9]  Lumbar spine pain [M54.50]  History of lumbar surgery [Z98.890]  Ambulatory dysfunction [R26.2]  Pseudoarthrosis of lumbar spine [S32.009K]  Lumbar radiculopathy [M54.16]    Post-Op Diagnosis Codes:     * Scoliosis, unspecified scoliosis type, unspecified spinal region [M41.9]     * Lumbar spine pain [M54.50]     * History of lumbar surgery [Z98.890]     * Ambulatory dysfunction [R26.2]     * Pseudoarthrosis of lumbar spine [S32.009K]     * Lumbar radiculopathy [M54.16]    Procedure(s) (LRB):  Removal hardware L2-S1, posterior fusion L5-S1, navigated revision instrumentation L2 to pelvis; application of bilateral S2 alar screws and bone grafting of the disc at L5-S1 (N/A)    Surgeons and Role:     * Yenni Stevens MD - Assisting      * Tylor Acosta MD - Assisting     * Eleni Benz PA-C - Assisting     * Bayron Bills MD -  Primary     Specimens:  * No specimens in log *    Estimated Blood Loss:   500 mL    Anesthesia Type:   General     Findings:   Lumbosacral pseudoarthrosis    Complications:   None      SIGNATURE: Bayron Bills MD   DATE: 2024   TIME: 1:43 PM

## 2024-12-19 NOTE — DISCHARGE INSTR - AVS FIRST PAGE
Dr. Bills Spine Surgery  Post-op Information and Instructions  Lumbar Fusion    1. Follow-up  - You should return to the office for your follow-up appointment approximately 2 weeks post-op. This should have been scheduled prior to your surgery.  If you do not have an appointment scheduled, please call [phone] to do so as soon as possible.  - At the first post op appointment we will check your incision and make sure that you are healing well.  - X-rays will be taken at this appointment to evaluate your hardware.  - If you need any refills on your pain medications, this will be addressed at your follow-up appointment.  - You can expect to have post-op appointments at 2 weeks, 6 weeks, 3 months, 6 months and 1 year after surgery.  After that time, we will continue to follow you yearly to monitor your fusion.    2. Incision Care:  - You will have a surgical dressing over the area. Keep dressing intact and incision dry until 2 week post-op visit. The surgical dressing is placed in a sterile environment and is important to allow the wound to begin healing in a sterile environment.  - You have sutures closing your incision and also sutures that are buried beneath the skin and will dissolve with time. The incision may be raised initially, although this will improve as the sutures dissolve.  - Please keep surgical dressing on and incision dry until your dressing is removed at 2 week post-op visit. Avoid getting incision wet in the shower until dressing are removed at follow-up visit. After 2 weeks, your incision may get wet in the shower, but DO NOT submerge your incision (bath, hot tub, pool, lake, etc.) until you have been cleared to do so. You may gently wash it with soap and water. Do not scrub the incision. Air-dry the incision or pat dry with clean, fresh towel before reapplying the dry dressing.  - If the dressing falls off prior to 2 week post-op visit, please keep the incision covered with a non-adhesive  dressing. Dressing material can be purchased over the counter at any drug store. Materials include: square gauze, ABD dressing, and skin tape. Place dry dressing over dry incision and tape the sides.  - Please do not apply any ointments or salves to the incision unless instructed to do so.  - If you notice any drainage, foul odor, increased redness, swelling or pain of the incision, please call our office immediately.  These may be signs of an infection and should be evaluated.    3. Medications  - You will be given a prescription for pain medication when you are discharged from the hospital.  - It is recommended that you take the medication as prescribed for the first 24-48 hours after surgery, even if your pain is minimal.  It is much easier to control your pain when you stay on top of the medications than if you wait to take them until your pain is severe.  - After the first few days, you should try to take the pain medication less often, and only when needed.  - You may take Tylenol in place of, not in addition to, your pain medication if you wish.  - DO NOT take NSAIDs (Advil, Aleve, Ibuprofen, Motrin, Naproxen, etc.) for 3 months following your surgery.  This may impede the healing of your fusion.  - DO NOT drive while you are taking narcotic pain medications.  - If there is an issue with any of your discharge medications, please call our office at [phone] to discuss.  - It is very common for surgery and narcotic pain medications to cause constipation.  It is very important that you eat a high fiber diet, drink lots of water, and also consider taking a stool softener while taking pain medications to help with this.  It is not uncommon for you to go several days without a bowel movement after surgery.  If you are constipated and it is accompanied by severe abdominal pain, nausea and vomiting, inability to keep food or drink down, and/or a fever, please call our office as these may be signs of a more  serious medical condition.    4. DVT prophylaxis:  - You were given a prescription of aspirin 81mg. Please take aspirin 81mg twice daily over the next 4 weeks for blood clot prevention.    5. Activity:  - You should have been fitted for a lumbar brace. You may remove this for hygiene (showering, etc.), and for sleeping. The brace should otherwise be worn for the first 3 months post-operatively. If you have any issues with the fit of your brace, you can call the office and we can have you come in to see our brace fitter.  - You should avoid any bending, twisting. If you must bend over, please use your knees.  - You CANNOT lift greater than 5lbs, or a gallon of milk, until instructed.  - You should try to avoid sitting for greater than 20 minutes at a time as this will cause your muscles to stiffen and spasm, making you more uncomfortable.  - You will start PT (physical therapy) around 3 months after surgery.  - You may resume your normal daily activities as tolerated.  - Walking is the best exercise and you should try to walk up to 1 mile throughout the day as you are recovering.  - You should NOT smoke following spinal fusion as this will impede your healing.    6. Diet:  - It is important to eat a well balanced diet to help your body heal.  - You should make sure that you are drinking plenty of fluids - water is best    7. Return to Work:  - You can expect to be out of work for at least 6 weeks up to 3 months.  We will discuss your return to work status at your post-op appointments, but please do not hesitate to call if you have any questions or concerns.  - Please make sure that any short-term disability paperwork is dropped off at the .    Please call our office if you have any of the following after surgery:  - Persistent fever greater than 100.5 degrees  - Foul odor, drainage, redness, swelling or increased pain around your incision site  - A headache that is worse with standing or sitting, and is  alleviated with lying flat on your back.  - New onset arm or leg weakness, numbness or tingling  - Significantly increased pain that is not alleviated with pain medications, rest and ice  - New-onset calf pain    If you experience any chest pain or shortness of breath after you are discharged, call 911 immediately.

## 2024-12-19 NOTE — OP NOTE
OPERATIVE REPORT  PATIENT NAME: Samantha Rodriguez    :  1965  MRN: [id number]  Pt Location: [facility] OR ROOM 18    SURGERY DATE: 2024    Surgeons and Role:     * Yenni Stevens MD - Assisting      * Tylor Acosta MD - Assisting     * Eleni Benz PA-C - Assisting     * Bayron Bills MD - Primary     Preop Diagnosis:  Scoliosis, unspecified scoliosis type, unspecified spinal region [M41.9]  Lumbar spine pain [M54.50]  History of lumbar surgery [Z98.890]  Ambulatory dysfunction [R26.2]  Pseudoarthrosis of lumbar spine [S32.009K]  Lumbar radiculopathy [M54.16]    Post-Op Diagnosis Codes:     * Scoliosis, unspecified scoliosis type, unspecified spinal region [M41.9]     * Lumbar spine pain [M54.50]     * History of lumbar surgery [Z98.890]     * Ambulatory dysfunction [R26.2]     * Pseudoarthrosis of lumbar spine [S32.009K]     * Lumbar radiculopathy [M54.16]    Procedure(s):  Exploration of L2-S1 Fusion mass: 07387  Removal of hardware (pedicle screws): 97301  Arthrodesis, L5-S1 PL: 66621  Arthrodesis, L4-5 PL: 46061  Arthrodesis, L3-4 PL: 98384  Arthrodesis, L2-3 PL: 66658  Posterior segmental instrumentation/pedicle screw fixation, L2-S2: 32632  Pelvic Fixation, S2AI: 22414  3D computer-assisted navigation: 83582    Specimen(s):  * No specimens in log *    Estimated Blood Loss:   500 mL    Drains:  Urethral Catheter Non-latex 16 Fr. (Active)   Number of days: 0       Anesthesia Type:   General    Operative Indications:  Scoliosis, unspecified scoliosis type, unspecified spinal region [M41.9]  Lumbar spine pain [M54.50]  History of lumbar surgery [Z98.890]  Ambulatory dysfunction [R26.2]  Pseudoarthrosis of lumbar spine [S32.009K]  Lumbar radiculopathy [M54.16]    58-year-old female with back pain, history of thoracolumbar fusion surgery with pseudoarthrosis and hardware complications, ambulatory dysfunction presents for surgical stabilization.  Preoperatively the patient was evaluated by  orthopedic spine surgeon Dr. Stevens and surgical intervention as well as nonoperative treatments were discussed in detail.  Due to her significant past medical history surgery was scheduled for [facility] for intraoperative and postoperative care.  I agreed to take over care for Samantha Rodriguez as well as postoperative management.  I personally reviewed imaging and electronic medical record as well as discussed case in depth with Dr. Stevens.  I agree with revision surgery giving her pseudoarthrosis and sagittal malalignment.  Preoperatively evaluated and examined Ms. Rodriguez in the preoperative holding area.  We discussed the surgical plan as well as the risks and benefits.  We discussed that deformity correction may not be feasible given her medical complexity as well as anterior interbody hardware.  We discussed that complete anatomical reconstruction may require additional anterior surgery.  Consent previously obtained in outpatient setting by Dr. Stevens.   Prior to surgery in the holding area, I again explained in detail to the patient, the possible risks of surgery which include the risk of nerve injury, persistent, and/or worsening pain, spinal fluid leak, infection, and/or meningitis, excessive bleeding, paralysis, death, blindness, blood vessel injury, need for further surgery, instability, as well as the potential for unforeseen medical and surgical complications.  We also discussed risk of junctional degeneration, bone graft complications, the differences in efficacy between local bone graft, autologous iliac crest bone graft and allograft and BMP, the FDA status of the instrumentation and products, the risk of nonhealing and instrumentation failure, as well as chronic pain.  We agreed to proceed with BMP usage.  I reiterated an understanding, that in general, spine surgery is more predictive of improving extremity discomfort rather than axial spine pain and arresting the progression of spinal cord  dysfunction rather than improving it.  Samantha Rodriguez had no further questions.     Operative Findings:  Lumbosacral pseudoarthrosis    Complications:   None    Procedure and Technique:  Patient was identified in the preoperative holding area.  The operative site was appropriately marked.      Patient was taken to the operating room.  Antibiotics were administered.  Sequential compression devices were applied.  After completion anesthesia, neuro monitoring leads were applied and amos was inserted.  Patient was placed prone on the Michael table.  During this time and the entire operation, care was taken to maintain appropriate perfusion pressures.  All bony prominences were properly padded.  Preprocedure fluoroscopic images were obtained in AP and lateral position.  The operative field was then prepped and draped in the normal sterile fashion.  Surgical loupes and headlamp were used during the procedure.      Incision was made in the skin over the intended surgical levels, with in the previous surgical scar and dissection was carried down through the subcutaneous tissue down to level of deep fascia.   Hemostasis was obtained using Bovie cautery.  Extensive scar tissue was encountered. The deep fascia was elevated off the posterior elements of L2-S1 in a subperiosteal manner.  We then continued our dissection lateral and identified the L2-S1 pedicle screws.  Dissection was carried out caudally to identify the posterior elements of the sacrum as well as the S1 and S2 neural foramen.     At this point we turned our attention to the existing fusion and the hardware.  Further dissection was carried out lateral on the right and left.  There was excess bone grafting in the posterior lateral gutter which had not been incorporated into the osseous fusion and was thoroughly debrided with curettes and rongeurs and then irrigated.  We then inspected the hardware.  First on the left side - the existing rudy was cut with a metal  cutting tristen, just superior to the L2 screw.  After the rudy and L2-S1 set screws were removed the pedicle screws were inspected.  L2 was removed to accommodate an in-line connector.  L3, L4 and S1 screws were loose, had excess toggle in the them and were removed.  The pedicle tract was probed with ball-tip probe - bone was encountered at the base of the tract as well as along all 4 walls of the pedicle.  The left L3, L4 and S1 screws were replaced (L3 7.5x40mm, L4 6.5x40mm, S1 9.5x40mm).  Neurophysiology monitoring was stable.  We turned our attention the right side - the existing rudy was cut with a metal cutting tristen just superior to the L3 screw.  After the rudy and L2-S1 set screws were removed the pedicle screws were inspected.  L3 was removed to accommodate an in-line connector.  The S1 screw was removed per the pre operative plan due to its misplacement in the foramen.      Attention was then directed to the placement of implants according to the presurgical plan. Stereotactic computer-assisted navigation (which additionally included but was not limited to intraoperative placement of fiducial markers and image-assisted registration) was utilized to increase precision during placement of instrumentation during surgery while avoiding vital structures.   O-Arm CT image acquisition was completed.  We turned our attention to the S2AI screws. First using a navigated bur for a starting hole, followed by navigated drill, the S2AI corridor was drilled.  A ball-tip probe was used to palpate and bone was encountered at the base of the tract as well as along all 4 walls.  The S2AI corridor was tapped using a navigated tap.  9.5x90mm pedicle screws bilateral were then placed under navigated guidance.  Neurophysiology monitoring was stable.   We then turned our attention to the right S1 pedicle screw.  First using a navigated bur for a starting hole, followed by navigated drill, the pedicle was drilled.  A ball-tip probe was  used to palpate the pedicle and bone was encountered at the base of the tract as well as along all 4 walls.  The pedicle was tapped using a navigated tap.  6.5x40mm pedicle screws bilateral were then placed under navigated guidance.  Neurophysiology monitoring was stable.  Fluoroscopy was used to confirm appropriate placement of screws.        Appropriate sized cobalt chrome right sided rudy was then contoured to fit the L2-S2 AI segment with a side to side connector.  The rudy was inserted into the tulip heads and secured.  Due to anatomical considerations a offset connector was used at S2 AI.  Then on the left side, appropriate sized cobalt chrome left sided rudy was then contoured to fit the L1-S2 AI segment with an in line connector.  The rudy was inserted into the tulip heads and secured.   Neurophysiology monitoring was stable.  The set screws were final tightened.  Final fluoroscopic images were obtained.  Orthogonal views confirmed appropriate placement of instrumentation.       We turned our attention back to the lumbar fusion procedure.  The wound was thoroughly irrigated.  The L2, L3, L4, L5 and S1 lamina, lateral pars and the lateral wall the facet joint were decorticated with the use of a high-speed tristen.  Local autograft, Simin strips and crushed cancellous allograft were mixed and placed in the posterior fusion bed.       Vancomycin powder was placed in the wound.  The wound was closed in a layered fashion.  Muscle layer and fascia was closed with #1 Vicryl and supplemented with a running Vicryl suture.  Closure continued with 2-0 Vicryl and monocryl for skin.   Mepilex dressing was applied to the wound.  The sponge and needle count were reported as correct at the end of the case.       The patient was gently flipped supine, emerged from anesthesia, and extubated.  Patient was transferred to the recovery room in stable condition.       I was present for the entire procedure.    Patient  Disposition:  PACU       SIGNATURE: Bayron Bills MD  DATE: December 19, 2024  TIME: 1:48 PM

## 2024-12-20 PROBLEM — G89.18 PAIN ASSOCIATED WITH SURGICAL PROCEDURE: Status: ACTIVE | Noted: 2024-12-20

## 2024-12-20 PROBLEM — D62 ACUTE BLOOD LOSS ANEMIA: Status: ACTIVE | Noted: 2024-12-20

## 2024-12-20 PROBLEM — G92.9 TOXIC ENCEPHALOPATHY: Status: ACTIVE | Noted: 2024-12-20

## 2024-12-20 LAB
ANION GAP SERPL CALCULATED.3IONS-SCNC: 7 MMOL/L (ref 4–13)
BASOPHILS # BLD AUTO: 0.03 THOUSANDS/ÂΜL (ref 0–0.1)
BASOPHILS NFR BLD AUTO: 0 % (ref 0–1)
BUN SERPL-MCNC: 10 MG/DL (ref 5–25)
CALCIUM SERPL-MCNC: 8.2 MG/DL (ref 8.4–10.2)
CHLORIDE SERPL-SCNC: 105 MMOL/L (ref 96–108)
CO2 SERPL-SCNC: 29 MMOL/L (ref 21–32)
CREAT SERPL-MCNC: 0.72 MG/DL (ref 0.6–1.3)
EOSINOPHIL # BLD AUTO: 0.07 THOUSAND/ÂΜL (ref 0–0.61)
EOSINOPHIL NFR BLD AUTO: 1 % (ref 0–6)
ERYTHROCYTE [DISTWIDTH] IN BLOOD BY AUTOMATED COUNT: 13.4 % (ref 11.6–15.1)
GFR SERPL CREATININE-BSD FRML MDRD: 91 ML/MIN/1.73SQ M
GLUCOSE SERPL-MCNC: 124 MG/DL (ref 65–140)
HCT VFR BLD AUTO: 30.7 % (ref 34.8–46.1)
HGB BLD-MCNC: 9.6 G/DL (ref 11.5–15.4)
IMM GRANULOCYTES # BLD AUTO: 0.04 THOUSAND/UL (ref 0–0.2)
IMM GRANULOCYTES NFR BLD AUTO: 1 % (ref 0–2)
LYMPHOCYTES # BLD AUTO: 0.85 THOUSANDS/ÂΜL (ref 0.6–4.47)
LYMPHOCYTES NFR BLD AUTO: 12 % (ref 14–44)
MCH RBC QN AUTO: 28.3 PG (ref 26.8–34.3)
MCHC RBC AUTO-ENTMCNC: 31.3 G/DL (ref 31.4–37.4)
MCV RBC AUTO: 91 FL (ref 82–98)
MONOCYTES # BLD AUTO: 0.67 THOUSAND/ÂΜL (ref 0.17–1.22)
MONOCYTES NFR BLD AUTO: 9 % (ref 4–12)
NEUTROPHILS # BLD AUTO: 5.58 THOUSANDS/ÂΜL (ref 1.85–7.62)
NEUTS SEG NFR BLD AUTO: 77 % (ref 43–75)
NRBC BLD AUTO-RTO: 0 /100 WBCS
PLATELET # BLD AUTO: 259 THOUSANDS/UL (ref 149–390)
PMV BLD AUTO: 10.1 FL (ref 8.9–12.7)
POTASSIUM SERPL-SCNC: 3.5 MMOL/L (ref 3.5–5.3)
RBC # BLD AUTO: 3.39 MILLION/UL (ref 3.81–5.12)
SODIUM SERPL-SCNC: 141 MMOL/L (ref 135–147)
WBC # BLD AUTO: 7.24 THOUSAND/UL (ref 4.31–10.16)

## 2024-12-20 PROCEDURE — 99223 1ST HOSP IP/OBS HIGH 75: CPT | Performed by: ANESTHESIOLOGY

## 2024-12-20 PROCEDURE — 99222 1ST HOSP IP/OBS MODERATE 55: CPT | Performed by: PSYCHIATRY & NEUROLOGY

## 2024-12-20 PROCEDURE — 97167 OT EVAL HIGH COMPLEX 60 MIN: CPT

## 2024-12-20 PROCEDURE — 99232 SBSQ HOSP IP/OBS MODERATE 35: CPT | Performed by: INTERNAL MEDICINE

## 2024-12-20 PROCEDURE — 97163 PT EVAL HIGH COMPLEX 45 MIN: CPT

## 2024-12-20 PROCEDURE — 85025 COMPLETE CBC W/AUTO DIFF WBC: CPT

## 2024-12-20 PROCEDURE — 99024 POSTOP FOLLOW-UP VISIT: CPT | Performed by: ORTHOPAEDIC SURGERY

## 2024-12-20 PROCEDURE — 80048 BASIC METABOLIC PNL TOTAL CA: CPT

## 2024-12-20 RX ADMIN — ARIPIPRAZOLE 10 MG: 10 TABLET ORAL at 08:38

## 2024-12-20 RX ADMIN — CEFAZOLIN SODIUM 2000 MG: 2 SOLUTION INTRAVENOUS at 03:07

## 2024-12-20 RX ADMIN — LUBIPROSTONE 8 MCG: 8 CAPSULE ORAL at 08:39

## 2024-12-20 RX ADMIN — HEPARIN SODIUM 5000 UNITS: 5000 INJECTION, SOLUTION INTRAVENOUS; SUBCUTANEOUS at 13:47

## 2024-12-20 RX ADMIN — ATORVASTATIN CALCIUM 40 MG: 40 TABLET, FILM COATED ORAL at 17:14

## 2024-12-20 RX ADMIN — PRAZOSIN HYDROCHLORIDE 2 MG: 2 CAPSULE ORAL at 21:25

## 2024-12-20 RX ADMIN — METHOCARBAMOL 500 MG: 500 TABLET ORAL at 17:14

## 2024-12-20 RX ADMIN — DOCUSATE SODIUM 100 MG: 100 CAPSULE, LIQUID FILLED ORAL at 17:14

## 2024-12-20 RX ADMIN — PREGABALIN 150 MG: 75 CAPSULE ORAL at 21:21

## 2024-12-20 RX ADMIN — SENNOSIDES 8.6 MG: 8.6 TABLET, FILM COATED ORAL at 08:39

## 2024-12-20 RX ADMIN — PREGABALIN 150 MG: 75 CAPSULE ORAL at 08:39

## 2024-12-20 RX ADMIN — HYDROXYZINE HYDROCHLORIDE 25 MG: 25 TABLET, FILM COATED ORAL at 08:39

## 2024-12-20 RX ADMIN — ACETAMINOPHEN 650 MG: 325 TABLET, FILM COATED ORAL at 05:14

## 2024-12-20 RX ADMIN — HEPARIN SODIUM 5000 UNITS: 5000 INJECTION, SOLUTION INTRAVENOUS; SUBCUTANEOUS at 05:17

## 2024-12-20 RX ADMIN — TRAZODONE HYDROCHLORIDE 300 MG: 100 TABLET ORAL at 21:21

## 2024-12-20 RX ADMIN — PANTOPRAZOLE SODIUM 40 MG: 40 TABLET, DELAYED RELEASE ORAL at 05:14

## 2024-12-20 RX ADMIN — HEPARIN SODIUM 5000 UNITS: 5000 INJECTION, SOLUTION INTRAVENOUS; SUBCUTANEOUS at 21:26

## 2024-12-20 RX ADMIN — ACETAMINOPHEN 650 MG: 325 TABLET, FILM COATED ORAL at 17:15

## 2024-12-20 RX ADMIN — BUSPIRONE HYDROCHLORIDE 15 MG: 10 TABLET ORAL at 17:15

## 2024-12-20 RX ADMIN — ACETAMINOPHEN 650 MG: 325 TABLET, FILM COATED ORAL at 11:02

## 2024-12-20 RX ADMIN — LUBIPROSTONE 8 MCG: 8 CAPSULE ORAL at 21:26

## 2024-12-20 RX ADMIN — HYDROXYZINE HYDROCHLORIDE 25 MG: 25 TABLET, FILM COATED ORAL at 21:21

## 2024-12-20 RX ADMIN — DOCUSATE SODIUM 100 MG: 100 CAPSULE, LIQUID FILLED ORAL at 08:39

## 2024-12-20 RX ADMIN — SERTRALINE 200 MG: 100 TABLET, FILM COATED ORAL at 21:21

## 2024-12-20 RX ADMIN — BUSPIRONE HYDROCHLORIDE 15 MG: 10 TABLET ORAL at 21:21

## 2024-12-20 RX ADMIN — AMLODIPINE BESYLATE 5 MG: 5 TABLET ORAL at 08:39

## 2024-12-20 RX ADMIN — METHOCARBAMOL 500 MG: 500 TABLET ORAL at 05:14

## 2024-12-20 RX ADMIN — METHOCARBAMOL 500 MG: 500 TABLET ORAL at 11:02

## 2024-12-20 RX ADMIN — OXYCODONE HYDROCHLORIDE 10 MG: 5 TABLET ORAL at 05:14

## 2024-12-20 RX ADMIN — BUSPIRONE HYDROCHLORIDE 15 MG: 10 TABLET ORAL at 08:39

## 2024-12-20 RX ADMIN — PREGABALIN 150 MG: 75 CAPSULE ORAL at 17:14

## 2024-12-20 RX ADMIN — HYDROXYZINE HYDROCHLORIDE 25 MG: 25 TABLET, FILM COATED ORAL at 17:15

## 2024-12-20 NOTE — PROGRESS NOTES
Progress Note - Hospitalist   Name: Samantha Rodriguez 58 y.o. female I MRN: [id number]  Unit/Bed#: -01 I Date of Admission: 12/19/2024   Date of Service: 12/20/2024 I Hospital Day: 1    Assessment & Plan  Spinal stenosis of lumbar region with neurogenic claudication  Status post removal L2-S1 hardware, with new L5-S1 fusion performed by Dr. Stevens on 12/19 without complication.  Primary management per orthopedic surgery team  On subcu heparin for DVT prophylaxis  Analgesia per primary/ Acute Pain service (dilaudid PCA pump currently)  Appears primary has ordered venous duplex for Sunday 12/22 to eval for DVT post op  Bipolar disorder (HCC)  Continue home Abilify  Psych consulted by primary, f/u input  Patient placed on virtual 1:1 overnight 12/19-->12/20 as she was agitated and disoriented--most likely related to post op and not psych however can f/u psych input, wean as able  Essential hypertension  Monitor on home amlodipine and prazosin  Titrate meds as needed  Tardive dyskinesia  Continue home valbenazine  History of CVA (cerebrovascular accident)  No antiplatelet medication listed on home list  Continue home statin  Opioid-induced constipation  Cont home lubipristone  Severe episode of recurrent major depressive disorder, without psychotic features (HCC)  Continue home Abilify, BuSpar, Zoloft  Psych consulted by primary, f/u input  Acute blood loss anemia  A/e/b >2 gram drop from pre-op levels  Monitor CBC  Transfuse PRN    VTE Pharmacologic Prophylaxis:   Moderate Risk (Score 3-4) - Pharmacological DVT Prophylaxis Ordered: heparin.    Mobility:   Basic Mobility Inpatient Raw Score: 14  JH-HLM Goal: 4: Move to chair/commode  JH-HLM Achieved: 1: Laying in bed  JH-HLM Goal NOT achieved. Continue with multidisciplinary rounding and encourage appropriate mobility to improve upon JH-HLM goals.    Patient Centered Rounds: I performed bedside rounds with nursing staff today.   Discussions with Specialists or  Other Care Team Provider: appreciate primary team and psych input     Education and Discussions with Family / Patient:  updates per primary team .     Current Length of Stay: 1 day(s)  Current Patient Status: Inpatient   Certification Statement: The patient will continue to require additional inpatient hospital stay due to per primary   Discharge Plan: [facility] is following this patient on consult. They are not yet medically stable for discharge secondary to post op recovery, pain control .    Code Status: Level 1 - Full Code    Subjective   Doing okay. States that she was agitated overnight due to pain and being uncomfortable in bed. She wants to get up and move today.     Objective :  Temp:  [97.2 °F (36.2 °C)-99.5 °F (37.5 °C)] 99.3 °F (37.4 °C)  HR:  [] 77  BP: ()/() 116/65  Resp:  [12-41] 17  SpO2:  [94 %-100 %] 94 %  O2 Device: Nasal cannula  Nasal Cannula O2 Flow Rate (L/min):  [3 L/min] 3 L/min    Body mass index is 39.87 kg/m².     Input and Output Summary (last 24 hours):     Intake/Output Summary (Last 24 hours) at 12/20/2024 0810  Last data filed at 12/20/2024 0732  Gross per 24 hour   Intake 3522.07 ml   Output 3300 ml   Net 222.07 ml       Physical Exam  Vitals and nursing note reviewed.   Constitutional:       Appearance: She is obese.      Comments: On RA   Cardiovascular:      Rate and Rhythm: Normal rate and regular rhythm.   Pulmonary:      Effort: No respiratory distress.   Abdominal:      General: There is no distension.      Tenderness: There is no abdominal tenderness.   Neurological:      Mental Status: She is alert and oriented to person, place, and time.   Psychiatric:      Comments: flat           Lines/Drains:              Lab Results: I have reviewed the following results:   Results from last 7 days   Lab Units 12/20/24  0637   WBC Thousand/uL 7.24   HEMOGLOBIN g/dL 9.6*   HEMATOCRIT % 30.7*   PLATELETS Thousands/uL 259   SEGS PCT % 77*   LYMPHO PCT % 12*   MONO PCT % 9    EOS PCT % 1     Results from last 7 days   Lab Units 12/20/24  0637   SODIUM mmol/L 141   POTASSIUM mmol/L 3.5   CHLORIDE mmol/L 105   CO2 mmol/L 29   BUN mg/dL 10   CREATININE mg/dL 0.72   ANION GAP mmol/L 7   CALCIUM mg/dL 8.2*   GLUCOSE RANDOM mg/dL 124         Results from last 7 days   Lab Units 12/19/24  1412   POC GLUCOSE mg/dl 174*               Recent Cultures (last 7 days):         Imaging Results Review: No pertinent imaging studies reviewed.  Other Study Results Review: No additional pertinent studies reviewed.    Last 24 Hours Medication List:     Current Facility-Administered Medications:     acetaminophen (TYLENOL) tablet 650 mg, Q6H KESHIA    aluminum-magnesium hydroxide-simethicone (MAALOX) oral suspension 30 mL, Q6H PRN    amLODIPine (NORVASC) tablet 5 mg, Daily    ARIPiprazole (ABILIFY) tablet 10 mg, Daily    atorvastatin (LIPITOR) tablet 40 mg, After Dinner    busPIRone (BUSPAR) tablet 15 mg, TID    calcium carbonate (TUMS) chewable tablet 1,000 mg, Daily PRN    docusate sodium (COLACE) capsule 100 mg, BID    heparin (porcine) subcutaneous injection 5,000 Units, Q8H KESHIA    HYDROmorphone (DILAUDID) 1 mg/mL 50 mL PCA, Continuous    hydrOXYzine HCL (ATARAX) tablet 25 mg, TID    lactated ringers infusion, Continuous, Last Rate: 50 mL/hr (12/20/24 0729)    lubiprostone (AMITIZA) capsule 8 mcg, BID    methocarbamol (ROBAXIN) tablet 500 mg, Q6H KESHIA    ondansetron (ZOFRAN) injection 4 mg, Q6H PRN    oxyCODONE (ROXICODONE) IR tablet 10 mg, Q4H PRN    oxyCODONE (ROXICODONE) IR tablet 5 mg, Q4H PRN    pantoprazole (PROTONIX) EC tablet 40 mg, Early Morning    prazosin (MINIPRESS) capsule 2 mg, HS    pregabalin (LYRICA) capsule 150 mg, TID    senna (SENOKOT) tablet 8.6 mg, Daily    sertraline (ZOLOFT) tablet 200 mg, HS    traZODone (DESYREL) tablet 300 mg, HS    Valbenazine Tosylate CAPS 1 capsule, Daily    Administrative Statements   Today, Patient Was Seen By: Nedra Martini PA-C      **Please Note: This note  may have been constructed using a voice recognition system.**

## 2024-12-20 NOTE — ASSESSMENT & PLAN NOTE
Possible toxic encephalopathy due to anesthesia; as evidenced by altered mental status  which improved by this morning, now oriented X 3; requiring virtual 1:1 for 24 hours and neuro checks every 4 hours. Now appears to be at her baseline     125

## 2024-12-20 NOTE — PLAN OF CARE
Problem: OCCUPATIONAL THERAPY ADULT  Goal: Performs self-care activities at highest level of function for planned discharge setting.  See evaluation for individualized goals.  Description: Treatment Interventions: ADL retraining, Functional transfer training, UE strengthening/ROM, Endurance training, Cognitive reorientation, Patient/family training, Equipment evaluation/education, Compensatory technique education, Continued evaluation, Energy conservation, Activityengagement          See flowsheet documentation for full assessment, interventions and recommendations.   Outcome: Progressing  Note: Limitation: Decreased ADL status, Decreased Safe judgement during ADL, Decreased cognition, Decreased endurance, Decreased self-care trans, Decreased high-level ADLs  Prognosis: Good  Assessment: Pt is a 57 y/o female that was admitted to [facility] 12/19/2024 with spinal stenosis of lumbar region. Pt s/p L2-S1 hardware removal with new L5-S1 fusion. Pt with LSO and spinal precautions. Pt with active OT orders and activity orders. Pt  has a past medical history of Anxiety, Arthritis, Arthritis of right knee, At risk for falls, Bipolar 2 disorder ([facility]), Depression, Dysphagia, Familial tremor, Fibromyalgia, GERD (gastroesophageal reflux disease), Hearing aid worn, [place] (hard of hearing), Hyperlipidemia, Hypertension, Impaired speech, Left-sided weakness, Lumbar disc disease with radiculopathy, Memory loss of unknown cause, Migraine, Multiple closed fractures of metatarsal bone of right foot, Obesity, Obesity, Class II, BMI 35-39.9, Osteoarthritis of both hips, Osteoarthritis of knee, Overactive bladder, Panic attack, Patellofemoral disorder of both knees, Post traumatic stress disorder, Primary localized osteoarthritis of both knees, Primary osteoarthritis of both knees, S/P insertion of spinal cord stimulator, S/P total knee arthroplasty, right, Sacroiliitis ([facility]), Seasonal allergies, Seizure-like activity ([facility]),  Seizures (HCC), Small bowel obstruction (HCC), Status post total knee replacement, left, Stroke (HCC), Tardive dyskinesia, Thrombosis of cerebral arteries, Urinary incontinence, Uses walker, Wears dentures, and Wears glasses. Pt is a ? Historian, pt reports living with daughter in a two level house with SU. Prior to admission pt (I) ADLs, IADLs, and functional mobility. Pt currently requires MIN A to complete grooming and UB ADLs, MOD A to complete functional transfers and take steps with rw, and MAX A to complete LB ADLs and toileting. Pt limited by decreased ADL status, functional transfers, functional mobility, and activity tolerance. Pt supine in bed at begning of session, pt seated in bedside chair at end of session with alarm set and items within reach. The patient's raw score on the AM-PAC Daily Activity Inpatient Short Form is 15. A raw score of less than 19 suggests the patient may benefit from discharge to post-acute rehabilitation services. Please refer to the recommendation of the Occupational Therapist for safe discharge planning. Recommend Level II moderate intensity OT services at d/c to maximize pt function.     Rehab Resource Intensity Level, OT: II (Moderate Resource Intensity)

## 2024-12-20 NOTE — PLAN OF CARE
Problem: Prexisting or High Potential for Compromised Skin Integrity  Goal: Skin integrity is maintained or improved  Description: INTERVENTIONS:  - Identify patients at risk for skin breakdown  - Assess and monitor skin integrity  - Assess and monitor nutrition and hydration status  - Monitor labs   - Assess for incontinence   - Turn and reposition patient  - Assist with mobility/ambulation  - Relieve pressure over bony prominences  - Avoid friction and shearing  - Provide appropriate hygiene as needed including keeping skin clean and dry  - Evaluate need for skin moisturizer/barrier cream  - Collaborate with interdisciplinary team   - Patient/family teaching  - Consider wound care consult   Outcome: Progressing     Problem: PAIN - ADULT  Goal: Verbalizes/displays adequate comfort level or baseline comfort level  Description: Interventions:  - Encourage patient to monitor pain and request assistance  - Assess pain using appropriate pain scale  - Administer analgesics based on type and severity of pain and evaluate response  - Implement non-pharmacological measures as appropriate and evaluate response  - Consider cultural and social influences on pain and pain management  - Notify physician/advanced practitioner if interventions unsuccessful or patient reports new pain  Outcome: Progressing     Problem: INFECTION - ADULT  Goal: Absence or prevention of progression during hospitalization  Description: INTERVENTIONS:  - Assess and monitor for signs and symptoms of infection  - Monitor lab/diagnostic results  - Monitor all insertion sites, i.e. indwelling lines, tubes, and drains  - Monitor endotracheal if appropriate and nasal secretions for changes in amount and color  - [place] appropriate cooling/warming therapies per order  - Administer medications as ordered  - Instruct and encourage patient and family to use good hand hygiene technique  - Identify and instruct in appropriate isolation precautions for  identified infection/condition  Outcome: Progressing  Goal: Absence of fever/infection during neutropenic period  Description: INTERVENTIONS:  - Monitor WBC    Outcome: Progressing     Problem: SAFETY ADULT  Goal: Patient will remain free of falls  Description: INTERVENTIONS:  - Educate patient/family on patient safety including physical limitations  - Instruct patient to call for assistance with activity   - Consult OT/PT to assist with strengthening/mobility   - Keep Call bell within reach  - Keep bed low and locked with side rails adjusted as appropriate  - Keep care items and personal belongings within reach  - Initiate and maintain comfort rounds  - Make Fall Risk Sign visible to staff  - Offer Toileting every 2 Hours, in advance of need  - Initiate/Maintain bed/chairalarm  - Obtain necessary fall risk management equipment: walker  - Apply yellow socks and bracelet for high fall risk patients  - Consider moving patient to room near nurses station  Outcome: Progressing  Goal: Maintain or return to baseline ADL function  Description: INTERVENTIONS:  -  Assess patient's ability to carry out ADLs; assess patient's baseline for ADL function and identify physical deficits which impact ability to perform ADLs (bathing, care of mouth/teeth, toileting, grooming, dressing, etc.)  - Assess/evaluate cause of self-care deficits   - Assess range of motion  - Assess patient's mobility; develop plan if impaired  - Assess patient's need for assistive devices and provide as appropriate  - Encourage maximum independence but intervene and supervise when necessary  - Involve family in performance of ADLs  - Assess for home care needs following discharge   - Consider OT consult to assist with ADL evaluation and planning for discharge  - Provide patient education as appropriate  Outcome: Progressing  Goal: Maintains/Returns to pre admission functional level  Description: INTERVENTIONS:  - Perform AM-PAC 6 Click Basic Mobility/ Daily  Activity assessment daily.  - Set and communicate daily mobility goal to care team and patient/family/caregiver.   - Collaborate with rehabilitation services on mobility goals if consulted  - Perform Range of Motion 3 times a day.  - Reposition patient every 2 hours.  - Dangle patient 3 times a day  - Stand patient 3 times a day  - Ambulate patient 3 times a day  - Out of bed to chair 3 times a day   - Out of bed for meals 3 times a day  - Out of bed for toileting  - Record patient progress and toleration of activity level   Outcome: Progressing     Problem: DISCHARGE PLANNING  Goal: Discharge to home or other facility with appropriate resources  Description: INTERVENTIONS:  - Identify barriers to discharge w/patient and caregiver  - Arrange for needed discharge resources and transportation as appropriate  - Identify discharge learning needs (meds, wound care, etc.)  - Arrange for interpretive services to assist at discharge as needed  - Refer to Case Management Department for coordinating discharge planning if the patient needs post-hospital services based on physician/advanced practitioner order or complex needs related to functional status, cognitive ability, or social support system  Outcome: Progressing     Problem: Knowledge Deficit  Goal: Patient/family/caregiver demonstrates understanding of disease process, treatment plan, medications, and discharge instructions  Description: Complete learning assessment and assess knowledge base.  Interventions:  - Provide teaching at level of understanding  - Provide teaching via preferred learning methods  Outcome: Progressing

## 2024-12-20 NOTE — ASSESSMENT & PLAN NOTE
Status post removal L2-S1 hardware, with new L5-S1 fusion performed by Dr. Stevens on 12/19 without complication.  Primary management per orthopedic surgery team  On subcu heparin for DVT prophylaxis  Analgesia per primary/ Acute Pain service (dilaudid PCA pump currently)

## 2024-12-20 NOTE — PROGRESS NOTES
Progress Note - Orthopedics   Name: Samantha Rodriguez 58 y.o. female I MRN: [id number]  Unit/Bed#: -[facility] I Date of Admission: 12/19/2024   Date of Service: 12/19/2024 I Hospital Day: 0     Assessment & Plan  Spinal stenosis of lumbar region with neurogenic claudication  POD 0 from removal hardware L2-S1, posterior fusion L5-S1, revision instrumentation L2-pelvis, bilateral S2 alar screws. Doing well.     WBAT  Lumbar spine precautions  LSO when OOB for comfort  Will monitor for ABLA and administer IVF/prbc as indicated for Greater than 2 gram drop or Hgb < 7   PT/OT  Incentive spirometry   Pain control  DVT ppx SQH starting 12/20  Diet regular    Subjective   58 y.o.female POD 0 from removal hardware L2-S1, posterior fusion L5-S1, revision instrumentation L2-pelvis, bilateral S2 alar screws. No acute events, no new complaints. Pain well controlled. Denies fevers, chills, CP, SOB, N/V, numbness or tingling.     Objective :  Temp:  [96.9 °F (36.1 °C)-98.3 °F (36.8 °C)] 97.2 °F (36.2 °C)  HR:  [] 91  BP: ()/() 159/107  Resp:  [12-41] 16  SpO2:  [95 %-100 %] 98 %  O2 Device: Nasal cannula  Nasal Cannula O2 Flow Rate (L/min):  [3 L/min] 3 L/min    Physical Exam  TTP sona-incisionally over lumbar spine.  Sensation intact to light touch left L2 through S1 dermatomes.   Sensation intact to light touch right L2 through S1 dermatomes     Left Motor: Hip flexion 3+/5, knee extension 3+/5, knee flexion 4/5, dorsiflexion 3+/5, plantarflexion 4/5  Right Motor: Hip flexion 3+/5, knee extension 3+/5, knee flexion 4/5, dorsiflexion 3+/5, plantarflexion 4/5    The patient is well perfused distally.       Lab Results: I have reviewed the following results:  Recent Labs     12/19/24  0852 12/19/24  1055 12/19/24  1155   HGB 11.2* 12.2 12.6   HCT 33* 36 37     Blood Culture:    Lab Results   Component Value Date    BLOODCX No Growth After 5 Days. 07/01/2021    BLOODCX No Growth After 5 Days. 07/01/2021     Wound  "Culture: No results found for: \"WOUNDCULT\"  "

## 2024-12-20 NOTE — ASSESSMENT & PLAN NOTE
Continue home Abilify  Psych consulted by primary, f/u input  Patient placed on virtual 1:1 overnight 12/19-->12/20 as she was agitated and disoriented--most likely related to post op and not psych however can f/u psych input, wean as able

## 2024-12-20 NOTE — PROGRESS NOTES
Progress Note - Orthopedics   Name: Samantha Rodriguez 58 y.o. female I MRN: [id number]  Unit/Bed#: -01 I Date of Admission: 12/19/2024   Date of Service: 12/20/2024 I Hospital Day: 1    Assessment & Plan  Spinal stenosis of lumbar region with neurogenic claudication  POD 0 from removal hardware L2-S1, posterior fusion L5-S1, revision instrumentation L2-pelvis, bilateral S2 alar screws. Doing well.     AMS overnight, routine psych consult placed for past psych hx. Could possibly be anesthesia; improved by this morning, now oriented X 3; requiring virtual 1:1 for 24 hours and neuro checks every 4 hours.     Acute blood loss anemia in the post-op setting; as evidenced by a drop in hemoglobin from 12.9 on 11/19/2024 to 9.6 post-op, with an EBL of 500 ml; requiring monitoring of hemoglobin levels.       WBAT  Lumbar spine precautions  LSO when OOB for comfor  Pt placed on virtual 1:1 overnight  F/u routine psych consult  Will monitor for ABLA and administer IVF/prbc as indicated for Greater than 2 gram drop or Hgb < 7   PT/OT  Incentive spirometry   Pain control  DVT ppx SQH starting 12/20  Diet regular    Subjective   58 y.o.female doing well post op. No acute events, no new complaints. Pain well controlled. Denies fevers, chills, CP, SOB, N/V, numbness or tingling. Patient reports no issues with urination or bowel movements. Pt is more oriented this am, oriented x3    Objective :  Temp:  [96.9 °F (36.1 °C)-99.5 °F (37.5 °C)] 99.5 °F (37.5 °C)  HR:  [] 91  BP: ()/() 138/86  Resp:  [12-41] 17  SpO2:  [95 %-100 %] 97 %  O2 Device: Nasal cannula  Nasal Cannula O2 Flow Rate (L/min):  [3 L/min] 3 L/min    Physical Exam  Physical Exam  TTP sona-incisionally over lumbar spine.  Sensation intact to light touch left L2 through S1 dermatomes.   Sensation intact to light touch right L2 through S1 dermatomes      Left Motor: Hip flexion 3+/5, knee extension 3+/5, knee flexion 4/5, dorsiflexion 3+/5,  "plantarflexion 4/5  Right Motor: Hip flexion 3+/5, knee extension 3+/5, knee flexion 4/5, dorsiflexion 3+/5, plantarflexion 4/5     The patient is well perfused distally.       Lab Results: I have reviewed the following results:  Recent Labs     12/19/24  0852 12/19/24  1055 12/19/24  1155   HGB 11.2* 12.2 12.6   HCT 33* 36 37     Blood Culture:    Lab Results   Component Value Date    BLOODCX No Growth After 5 Days. 07/01/2021    BLOODCX No Growth After 5 Days. 07/01/2021     Wound Culture: No results found for: \"WOUNDCULT\"  "

## 2024-12-20 NOTE — ASSESSMENT & PLAN NOTE
Patient has a longstanding history of depression, generalized anxiety with panic attacks, PTSD, bipolar disorder. She has over six inpatient psychiatric hospitalizations due to the above, most recently from 11/29-12/2/24 at [facility] - admitted for depression secondary to chronic back pain and suicidal ideation. Currently she is mildly disoriented - awake and intermittently alert. She is oriented to person, place, and time, however delayed in cognition and impaired in judgement/attention.    No barriers to discharge to acute rehab/skilled nursing facility from psychiatric perspective  Continue current psychiatric medication regimen  Aripiprazole 10 mg daily  Buspar 15 mg TID  Hydroxyzine 25 mg TID  Sertraline 200 mg HS  Trazodone 300 mg HS  Ingrezza 60 mg daily  Rest of care per primary team  Patient remains slightly disoriented, which can be expected post operatively. From a behavioral and psychiatric standpoint she is stable without intention for self-harm or harm to others.  Psychiatry to sign off at this time.Please reach out with any acute concerns

## 2024-12-20 NOTE — CONSULTS
Consultation - Acute Pain   Name: Samantha Rodriguez 58 y.o. female I MRN: [id number]  Unit/Bed#: -01 I Date of Admission: 12/19/2024   Date of Service: 12/20/2024 I Hospital Day: 1       Inpatient consult to Acute Pain Service     Date/Time  12/20/2024 10:21 AM     Performed by  Mike Perez MD   Authorized by  Yfn Mcgraw MD           Physician Requesting Evaluation: Yenni Stevens MD   Reason for Evaluation / Principal Problem: postoperative pain management    Assessment & Plan  Spinal stenosis of lumbar region with neurogenic claudication    Bipolar disorder (HCC)    Essential hypertension    Tardive dyskinesia    History of CVA (cerebrovascular accident)    Opioid-induced constipation    Severe episode of recurrent major depressive disorder, without psychotic features (HCC)    Acute blood loss anemia    Pain associated with surgical procedure  Continue acetominophen 675 Q6 scheduled  Continue robaxin 500 mg Q6 scheduled  Continue lyrica 150 mg Q8 scheduled  On hydromorphone PCA 0/.2/10  Continue oxycodone 5/10 Q4 mod/severe pain PRN        APS will continue to follow. Please contact Acute Pain Service - via [employer] from [phone] with additional questions or concerns. See [employer] E or Sinch for additional contacts and after hours information.     History of Present Illness    HPI: Samantha Rodriguez is a 58 y.o. female POD1 from removal hardware L2-S1, posterior fusion L5-S1, revision instrumentation L2-pelvis, bilateral S2 alar screws.  Overnight patient with AMS currently on virtual 1:1.  On exam this morning, patient was somnolent.  Pain appears reasonably well controlled on current regimen, but history limited by patient drowsiness.  This could be related to residual anesthesia after lengthy procedure.  A psych consult is pending.       Current pain location(s): Pain Score: 0  Pain Location/Orientation: Location: Back  Pain Scale: Pain Assessment Tool: 0-10    Pain Management  Physician:    I have reviewed the patient's controlled substance dispensing history in the Prescription Drug Monitoring Program in compliance with the [facility] regulations before prescribing any controlled substances.     Review of Systems  I have reviewed the patient's PMH, PSH, Social History, Family History, Meds, and Allergies    Objective :  Temp:  [97.2 °F (36.2 °C)-99.5 °F (37.5 °C)] 99.3 °F (37.4 °C)  HR:  [] 77  BP: ()/() 116/65  Resp:  [12-41] 17  SpO2:  [94 %-100 %] 94 %  O2 Device: None (Room air)  Nasal Cannula O2 Flow Rate (L/min):  [3 L/min] 3 L/min    Physical Exam  Vitals and nursing note reviewed.   Constitutional:       Comments: Somnolent   HENT:      Head: Atraumatic.      Mouth/Throat:      Mouth: Mucous membranes are moist.   Eyes:      Extraocular Movements: Extraocular movements intact.   Cardiovascular:      Rate and Rhythm: Normal rate and regular rhythm.   Pulmonary:      Breath sounds: Normal breath sounds.   Abdominal:      Palpations: Abdomen is soft.   Skin:     General: Skin is warm.   Neurological:      General: No focal deficit present.          Lab Results: I have reviewed the following results:  Estimated Creatinine Clearance: 87.9 mL/min (by C-G formula based on SCr of 0.72 mg/dL).  Lab Results   Component Value Date    WBC 7.24 12/20/2024    WBC 4.50 10/26/2015    HGB 9.6 (L) 12/20/2024    HGB 14.9 10/26/2015    HCT 30.7 (L) 12/20/2024    HCT 41.9 10/26/2015     12/20/2024     10/26/2015         Component Value Date/Time     07/27/2015 1206    K 3.5 12/20/2024 0637    K 3.2 (L) 10/16/2024 2317     12/20/2024 0637     10/16/2024 2317    CO2 29 12/20/2024 0637    CO2 25 12/19/2024 1155    CO2 27 10/16/2024 2317    BUN 10 12/20/2024 0637    BUN 7 10/16/2024 2317    CREATININE 0.72 12/20/2024 0637    CREATININE 0.65 10/16/2024 2317         Component Value Date/Time    CALCIUM 8.2 (L) 12/20/2024 0637    CALCIUM 9.6 10/16/2024 2317     ALKPHOS 79 11/19/2024 0622    ALKPHOS 110 10/16/2024 2317    AST 17 11/19/2024 0622    AST 19 10/16/2024 2317    ALT 17 11/19/2024 0622    ALT 18 10/16/2024 2317    BILITOT 0.51 02/22/2014 0615    TP 6.2 (L) 11/19/2024 0622    TP 7.7 10/16/2024 2317    ALB 3.8 11/19/2024 0622    ALB 4.6 10/16/2024 2317

## 2024-12-20 NOTE — RESTORATIVE TECHNICIAN NOTE
Restorative Technician Note      Patient Name: Samantha Rodriguez     Restorative Tech Visit Date: 12/20/24  Note Type: Bracing, Initial consult  Patient Position Upon Consult: Supine  Brace Applied: [place] LSO (set to LSO)  Additional Brace Ordered: No  Patient Position When Brace Applied: Seated  Bracing Recommendations: Recommendation (comment) (while OOB)  Education Provided: Yes  Patient Position at End of Consult: Supine; All needs within reach  Nurse Communication: Nurse aware of consult, application of brace    Please contact [facility] PT Restorative tech on Epic Secure Chat  in regards to bracing instruction and/or adjustment.     Lory Ly, Restorative Technician

## 2024-12-20 NOTE — CASE MANAGEMENT
Case Management Assessment & Discharge Planning Note    Patient name Samantha Rodriguez  Location /-01 MRN [id number]  : 1965 Date 2024       Current Admission Date: 2024  Current Admission Diagnosis:Spinal stenosis of lumbar region with neurogenic claudication   Patient Active Problem List    Diagnosis Date Noted Date Diagnosed    Acute blood loss anemia 2024     Pain associated with surgical procedure 2024     Severe episode of recurrent major depressive disorder, without psychotic features ([facility]) 2024     Chronic low back pain, unspecified back pain laterality, unspecified whether sciatica present 2024     Opioid-induced constipation 2024     Weight gain 2024     Numbness 2023     Memory loss 2023     Hemiplegia, post-stroke ([facility]) 2022     CVA (cerebral vascular accident) ([facility]) 2022     Mixed hyperlipidemia 2021     History of CVA (cerebrovascular accident) 2021     Dysphagia 2020     Ambulatory dysfunction 2020     Status post insertion of spinal cord stimulator 2020     Tardive dyskinesia 2020     MIMI (obstructive sleep apnea)      Obesity, morbid ([facility]) 2020     Post laminectomy syndrome 10/07/2019     Bipolar I disorder, most recent episode depressed ([facility]) 2019 09/15/2023    Spinal stenosis of lumbar region with neurogenic claudication 2018     Lumbar radiculopathy 2017     Chronic pain disorder 2017     Lumbar spondylosis 10/31/2016     Generalized anxiety disorder with panic attacks 10/26/2016     Bipolar disorder ([facility]) 2015     PTSD (post-traumatic stress disorder) 2015     Panic disorder without agoraphobia 2015     Tremor 2014     Migraine 2014     GERD without esophagitis 2014     Cognitive disorder 2014     Overactive bladder 2013     Mood insomnia ([facility]) 2013     Urinary incontinence  09/24/2012     Vitamin D deficiency 09/18/2012     Fibromyalgia 09/14/2012     Essential hypertension 09/14/2012       LOS (days): 1  Geometric Mean LOS (GMLOS) (days): 2.4  Days to GMLOS:1.5     OBJECTIVE:  PATIENT READMITTED TO HOSPITAL  Risk of Unplanned Readmission Score: 21.83         Current admission status: Inpatient  Referral Reason: Other    Preferred Pharmacy:   [place] - [address]  [address] [address]  Phone: [phone] Fax: [phone]    [id number] - [place] - [address]  [address] [facility] [place] [address]  Phone: [phone] Fax: [phone]    [facility] [place], [place] - [address] [place]  [address] [place] [place] [address]  Phone: [phone] Fax: [phone]    [facility] [place], [place] - [address] [facility]  [address]  [facility] [address]  Phone: [phone] Fax: [phone]    [facility] [place] - [address]  [address]  [place] [address]  Phone: [phone] Fax: [phone]    Primary Care Provider: Glo Valente DO    Primary Insurance: [facility]  Secondary Insurance:     ASSESSMENT:  Active Health Care Proxies       [facility] Representative - Daughter   Primary Phone: [phone] (Mobile)  Home Phone: [phone]                    Readmission Root Cause  30 Day Readmission: No    Patient Information  Admitted from:: Home  Mental Status: Alert  During Assessment patient was accompanied by: Not accompanied during assessment  Assessment information provided by:: Patient  Primary Caregiver: Self  Support Systems: Children  County of Residence: [place]  [place] [facility] do you live in?: [place]  Type of Current Residence: 2 story home  Upon entering residence, is there a bedroom on the main floor (no further  steps)?: Yes  Upon entering residence, is there a bathroom on the main floor (no further steps)?: Yes  Living Arrangements: Lives w/ Children    Activities of Daily Living Prior to Admission  Functional Status: Independent  Completes ADLs independently?: Yes  Ambulates independently?: Yes  Does patient use assisted devices?: Yes  Assisted Devices (DME) used: Walker  Does patient currently own DME?: Yes  What DME does the patient currently own?: Walker  Does patient have a history of Outpatient Therapy (PT/OT)?: Yes  Does the patient have a history of Short-Term Rehab?: Yes  Does patient have a history of HHC?: No  Does patient currently have HHC?: Yes ([facility])         Patient Information Continued  Income Source: SSI/SSD  Does patient have prescription coverage?: Yes  Does patient receive dialysis treatments?: No  Does patient have a history of substance abuse?: No  Does patient have a history of Mental Health Diagnosis?: Yes (Anxiety)  Is patient receiving treatment for mental health?: Yes  Has patient received inpatient treatment related to mental health in the last 2 years?: No         Means of Transportation  Means of Transport to Appts:: Family transport          DISCHARGE DETAILS:    Discharge planning discussed with:: Patient  Freedom of Choice: Yes     CM contacted family/caregiver?: Yes (VM left for family)  Were Treatment Team discharge recommendations reviewed with patient/caregiver?: Yes  Did patient/caregiver verbalize understanding of patient care needs?: Yes  Were patient/caregiver advised of the risks associated with not following Treatment Team discharge recommendations?: Yes    Contacts  Patient Contacts: [place]  Relationship to Patient:: Family  Contact Method: Phone  Phone Number:  left          Additional Comments: HUNTER completed Open with pt at bedside.  Per pt, she resides at home with her daughter in a 2 michelle home with a first floor set up, 3 SKY.  Pt has a history of admission to [facility]  but states she does not want to go to an STR at d/c.  Pt also states she would not be interested in HHC at d/c.  Pt states she has a history of anxiety with recent IP psy admission.  If pt needs STR at d/c, pt would need to be optioned.

## 2024-12-20 NOTE — CONSULTS
Consultation - Hospitalist   Name: Samantha Rodriguez 58 y.o. female I MRN: [id number]  Unit/Bed#: -01 I Date of Admission: 12/19/2024   Date of Service: 12/19/2024 I Hospital Day: 0   Inpatient consult to Internal Medicine  Consult performed by: Vitor Chen DO  Consult ordered by: Eleni Benz PA-C        Physician Requesting Evaluation: Yenni Stevens MD   Reason for Evaluation / Principal Problem: postoperative medical management    Assessment & Plan  Spinal stenosis of lumbar region with neurogenic claudication  Status post removal L2-S1 hardware, with new L5-S1 fusion performed by Dr. Stevens on 12/19 without complication.  Primary management per orthopedic surgery team  On subcu heparin for DVT prophylaxis  Analgesia per primary/ Acute Pain service (dilaudid PCA pump currently)  Bipolar disorder (HCC)  Will monitor mood on home Abilify  Essential hypertension  Monitor on home amlodipine and prazosin  titrate meds as needed  Tardive dyskinesia  Continue home valbenazine  History of CVA (cerebrovascular accident)  No antiplatelet medication listed on home list  Continue home statin  Opioid-induced constipation  Cont home lubipristone  Severe episode of recurrent major depressive disorder, without psychotic features (HCC)  Continue home Abilify, BuSpar, Zoloft  I have discussed the above management plan in detail with the primary service.       VTE Pharmacologic Prophylaxis:   High Risk (Score >/= 5) - Pharmacological DVT Prophylaxis Ordered: heparin. Sequential Compression Devices Ordered.  Code Status: Level 1 - Full Code   Discussion with family: Patient declined call to .     History of Present Illness   Chief Complaint: back pain    Samantha Rodriguez is a 58 y.o. female with a PMH of lumbar stenosis and radiculopathy, prior lumbar spinal surgery, bipolar disorder, hypertension, tardive dyskinesia, history of CVA, opioid-induced constipation, fibromyalgia, depression,  anxiety, PTSD, who presents  for elective spinal surgery.  The patient follows closely with Dr. Stevens as an outpatient, and presented today for elective hardware removal, and subsequent L5-S1 fusion.  She tolerated the procedure well and was transferred back to the medical surgical floor.  Postoperatively the hospitalist team has been asked for assistance with medical management.  At the time my evaluation patient is complaining of some pain in her low back but otherwise pleasant.    REVIEW OF SYSTEMS  General Denies fevers or chills. Denies generalized weakness or fatigue.    HEENT Denies hearing or vision changes.   Cardiovascular Denies chest pain. Denies LE swelling. Denies palpitations. Denies dyspnea on exertion.   Respiratory Denies cough. Denies difficulty breathing. Denies shortness of breath.   Genitourinary Denies hematuria. Denies dysuria. Denies difficulty voiding.Denies incontinence.   Gastrointestinal Denies nausea, vomiting, or diarrhea. Denies hematochezia, melena, or hematemesis.    Musculoskeletal Denies arthralgias or myalgias. Denies joint swelling.   Psychiatric  Denies changes in mood. Denies anxiety or depression.   Neurologic Denies headache. Denies lightheadedness/dizziness.Denies numbness/tingling. Denies weakness.   Endocrine Denies weight loss or weight gain. Denies excessive thirst, sweating, urination.         Historical Information   Past Medical History:   Diagnosis Date    Anxiety     Arthritis     left knee    Arthritis of right knee 10/06/2020    At risk for falls     Bipolar 2 disorder (HCC)     FOLLOWS WITH PSYCHIATRIST. CONTINUE LAMOTRIGINE; RESOLVED: [id number]    Depression     Dysphagia     per [facility] paperwork    Familial tremor     both hands    Fibromyalgia     LAST ASSESSED: [id number]    GERD (gastroesophageal reflux disease)     Hearing aid worn     left ear    [place] (hard of hearing)     left ear    Hyperlipidemia     Hypertension     Impaired speech      Left-sided weakness     Lumbar disc disease with radiculopathy 2018    Memory loss of unknown cause     long and short term    Migraine     Multiple closed fractures of metatarsal bone of right foot 2021    Obesity     Obesity, Class II, BMI 35-39.9     Osteoarthritis of both hips 10/31/2016    Osteoarthritis of knee 2013    Description: Continue Tylenol and Naproxen. Encourage exercise and weight loss. Patient refused physical therapy. I will refer the patient back to Orthopedics.    Overactive bladder     Panic attack     Patellofemoral disorder of both knees 2020    Post traumatic stress disorder     Primary localized osteoarthritis of both knees 2017    Primary osteoarthritis of both knees 2020    S/P insertion of spinal cord stimulator     no remote    S/P total knee arthroplasty, right 03/10/2022    Sacroiliitis (HCC) 2017    Seasonal allergies     Seizure-like activity (HCC) 2022    Seizures (HCC)     possible seizure like activity    Small bowel obstruction (HCC) 2023    Status post total knee replacement, left 2022    Stroke (HCC)     questionable stroke 2009    Tardive dyskinesia     PATIENT STATES    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  CONT ASA 81 MG DAILY; RESOLVED: [id number]    Urinary incontinence     Uses walker     Wears dentures     partial lower / full upper- doesnt wear    Wears glasses      Past Surgical History:   Procedure Laterality Date    BACK SURGERY       SECTION      COLONOSCOPY      RESOLVED: 2016    EAR SURGERY      EGD      HYSTERECTOMY  2004    MYRINGOTOMY W/ TUBES Left     NECK SURGERY  2019    SD ARTHRP KNE CONDYLE&PLATU MEDIAL&LAT COMPARTMENTS Right 2022    Procedure: ARTHROPLASTY KNEE TOTAL;  Surgeon: Chandni Tuttle DO;  [facility];  Service: Orthopedics    SD ARTHRP KNE CONDYLE&PLATU MEDIAL&LAT COMPARTMENTS Left 2022    Procedure: ARTHROPLASTY  KNEE TOTAL;  Surgeon: Chandni Tuttle DO;  [facility];  Service: Orthopedics    AZ CYSTOURETHROSCOPY N/A 02/18/2016    Procedure: CYSTOSCOPY, BOTOX INJECTION;  Surgeon: Abraham Teague MD;  [facility];  Service: Gynecology    AZ INSJ/RPLCMT SPINAL NPG/RCVR POCKET CRTJ&CONNJ Right 02/10/2021    Procedure: REPLACEMENT IMPLANTABLE PULSE GENERATOR DORSAL SPINAL COLUMN STIMULATOR, RIGHT;  Surgeon: Carlos Alberto Jacome MD;  [facility];  Service: Neurosurgery    AZ PRQ IMPLTJ NSTIM ELECTRODE ARRAY EPIDURAL Right 07/28/2020    Procedure: INSERTION THORACIC DORSAL COLUMN SPINAL CORD STIMULATOR PERCUTANEOUS W IMPLANTABLE PULSE GENERATOR, RIGHT;  Surgeon: Carlos Alberto Jacome MD;  [facility];  Service: Neurosurgery    TONSILLECTOMY      TUBAL LIGATION  1986    UPPER GASTROINTESTINAL ENDOSCOPY  09/2020     Social History     Tobacco Use    Smoking status: Never    Smokeless tobacco: Never   Vaping Use    Vaping status: Never Used   Substance and Sexual Activity    Alcohol use: Not Currently     Comment: 2 x year; being a social drinker as per all scripts     Drug use: No    Sexual activity: Not Currently     E-Cigarette/Vaping    E-Cigarette Use Never User      E-Cigarette/Vaping Substances    Nicotine No     THC No     CBD No     Flavoring No     Other No     Unknown No      Family History   Problem Relation Age of Onset    Colon cancer Mother     Alzheimer's disease Father     Stroke Father     No Known Problems Sister     Colon cancer Brother     Bipolar disorder Brother     No Known Problems Brother     Depression Paternal Grandfather     Breast cancer Maternal Aunt     Colon cancer Maternal Aunt     Seizures Son     Heart disease Other     Diabetes Other     Hypertension Other     Thyroid disease Neg Hx      Social History:  Marital Status:    Occupation:   Patient Pre-hospital Living Situation: Home  Patient Pre-hospital Level of Mobility: walks  Patient Pre-hospital Diet  Restrictions: none    Meds/Allergies   I have reviewed home medications using recent Epic encounter.  Prior to Admission medications    Medication Sig Start Date End Date Taking? Authorizing Provider   acetaminophen (TYLENOL) 325 mg tablet Take 2 tablets (650 mg total) by mouth every 4 (four) hours as needed for moderate pain 12/2/24  Yes Oleksandr Massey MD   aluminum-magnesium hydroxide-simethicone (MAALOX) 2375-7789-626 mg/30 mL suspension Take 30 mL by mouth in the morning 12/2/24  Yes Oleksandr Massey MD   amLODIPine (NORVASC) 5 mg tablet Take 1 tablet (5 mg total) by mouth daily 12/2/24 3/2/25 Yes Oleksandr Massey MD   ARIPiprazole (ABILIFY) 10 mg tablet Take 1 tablet (10 mg total) by mouth daily 12/3/24  Yes Oleksandr Massey MD   aspirin (Aspirin Low Dose) 81 mg EC tablet Take 1 tablet (81 mg total) by mouth daily 12/2/24  Yes Oleksandr Massey MD   atorvastatin (LIPITOR) 40 mg tablet Take 1 tablet (40 mg total) by mouth daily with dinner 12/2/24  Yes Oleksandr Massey MD   bisacodyl (DULCOLAX) 10 mg suppository Insert 10 mg into the rectum as needed for constipation As needed on day 5 of no bowel movement   Yes Historical Provider, MD   busPIRone (BUSPAR) 15 mg tablet Take 1 tablet (15 mg total) by mouth 3 (three) times a day 12/2/24  Yes Oleksandr Massey MD   Cholecalciferol (VITAMIN D3) 1,000 units tablet Take 1 tablet (1,000 Units total) by mouth daily 12/2/24  Yes Oleksandr Massey MD   cyclobenzaprine (FLEXERIL) 10 mg tablet Take 1 tablet (10 mg total) by mouth 3 (three) times a day as needed for muscle spasms 12/2/24  Yes Oleksandr Massey MD   Diclofenac Sodium (VOLTAREN) 1 % Apply 2 g topically 4 (four) times a day as needed (pain) 7/23/24  Yes Yfn Ruelas DO   hydrOXYzine HCL (ATARAX) 25 mg tablet Take 1 tablet (25 mg total) by mouth 3 (three) times a day 12/2/24  Yes Oleksandr Massey MD    lubiprostone (AMITIZA) 8 mcg capsule Take 1 capsule (8 mcg total) by mouth 2 (two) times a day 12/2/24  Yes Oleksandr Massey MD   magnesium Oxide (MAG-OX) 400 mg TABS Take 1 tablet (400 mg total) by mouth daily 12/3/24  Yes Oleksandr Massey MD   morphine (MSIR) 15 mg tablet Take 1 tablet (15 mg total) by mouth 2 (two) times a day Max Daily Amount: 30 mg 12/2/24 1/1/25 Yes Oleksandr Massey MD   ondansetron (ZOFRAN) 4 mg tablet  12/6/24  Yes Historical Provider, MD   pantoprazole (PROTONIX) 40 mg tablet Take 1 tablet (40 mg total) by mouth daily in the early morning 12/3/24  Yes Oleksandr Massey MD   prazosin (MINIPRESS) 2 mg capsule Take 1 capsule (2 mg total) by mouth daily at bedtime 12/2/24  Yes Oleksandr Massey MD   pregabalin (LYRICA) 150 mg capsule Take 1 capsule (150 mg total) by mouth 3 (three) times a day 12/2/24 1/1/25 Yes Oleksandr Massey MD   senna-docusate sodium (SENOKOT S) 8.6-50 mg per tablet Take 1 tablet by mouth daily as needed for constipation 12/2/24  Yes Oleksandr Massey MD   sertraline (ZOLOFT) 100 mg tablet Take 2 tablets (200 mg total) by mouth daily at bedtime 12/2/24  Yes Oleksandr Massey MD   sodium phosphate (PEDIA-LAX) 3.5-9.5 g 59 mL enema Insert 1 enema into the rectum once   Yes Historical Provider, MD   tirzepatide (Zepbound) 2.5 mg/0.5 mL auto-injector Inject 2.5 mg under the skin Once a week 12/5/24  Yes Historical Provider, MD   traZODone (DESYREL) 300 MG tablet Take 1 tablet (300 mg total) by mouth daily at bedtime 12/2/24  Yes Oleksandr Massey MD   Valbenazine Tosylate (Ingrezza) 60 MG CAPS Take 1 capsule by mouth in the morning 12/2/24  Yes Oleksandr Massey MD   loratadine (CLARITIN) 10 mg tablet Take 1 tablet (10 mg total) by mouth daily 12/3/24   Oleksandr Massey MD     No Known Allergies    Objective :  Temp:  [96.9 °F (36.1 °C)-98.3 °F (36.8 °C)] 97.2 °F (36.2  °C)  HR:  [] 91  BP: ()/() 159/107  Resp:  [12-41] 16  SpO2:  [95 %-100 %] 98 %  O2 Device: Nasal cannula  Nasal Cannula O2 Flow Rate (L/min):  [3 L/min] 3 L/min    PHYSICAL EXAM:    Vitals signs reviewed  Constitutional   Awake and cooperative. NAD.   Head/Neck   Normocephalic. Atraumatic.   HEENT   No scleral icterus. EOMI.   Heart   Regular rate and rhythm. No murmers.   Lungs   Clear to auscultation bilaterally. Respirations unlaboured.   Abdomen   Soft. Nontender. Nondistended.    Skin   Skin color normal. No rashes.   Extremities   No deformities. No peripheral edema.   Neuro  Alert.  Oriented to self.  Moves all extremities.  EOMIs intact.  Lipsmacking noted.   Psych   Mood stable. Affect normal.         Lines/Drains:  Lines/Drains/Airways       Active Status       Name Placement date Placement time Site Days    Urethral Catheter Non-latex 16 Fr. 12/19/24  0815  Non-latex  less than 1                  Urinary Catheter:  Goal for removal: Voiding trial when ambulation improves               Lab Results: I have reviewed the following results:  Results from last 7 days   Lab Units 12/19/24  1155   I STAT HEMOGLOBIN g/dl 12.6   HEMATOCRIT, ISTAT % 37     Results from last 7 days   Lab Units 12/19/24  1155   CO2, I-STAT mmol/L 25         Results from last 7 days   Lab Units 12/19/24  1412   POC GLUCOSE mg/dl 174*     Lab Results   Component Value Date    HGBA1C 4.8 11/18/2024    HGBA1C 5.2 02/20/2024    HGBA1C 4.9 11/30/2023           Imaging Results Review: No pertinent imaging studies reviewed.  Other Study Results Review: No additional pertinent studies reviewed.    Administrative Statements   I have spent a total time of 50 minutes in caring for this patient on the day of the visit/encounter including Diagnostic results, Prognosis, Patient and family education, Impressions, Counseling / Coordination of care, Documenting in the medical record, Reviewing / ordering tests, medicine, procedures   , Obtaining or reviewing history  , and Communicating with other healthcare professionals .    ** Please Note: This note has been constructed using a voice recognition system. **

## 2024-12-20 NOTE — ASSESSMENT & PLAN NOTE
Status post removal L2-S1 hardware, with new L5-S1 fusion performed by Dr. Stevens on 12/19 without complication.  Primary management per orthopedic surgery team  On subcu heparin for DVT prophylaxis  Analgesia per primary/ Acute Pain service (dilaudid PCA pump currently)  Appears primary has ordered venous duplex for Sunday 12/22 to eval for DVT post op

## 2024-12-20 NOTE — ASSESSMENT & PLAN NOTE
Continue acetominophen 675 Q6 scheduled  Continue robaxin 500 mg Q6 scheduled  Continue lyrica 150 mg Q8 scheduled  On hydromorphone PCA 0/.2/10  Continue oxycodone 5/10 Q4 mod/severe pain PRN

## 2024-12-20 NOTE — RESTORATIVE TECHNICIAN NOTE
Restorative Technician Note      Patient Name: Samantha Rodriguez     [facility] Visit Date: 12/20/24  Note Type: Mobility  Patient Position Upon Consult: Bedside chair  Activity Performed: Ambulated; Transferred  Assistive Device: Roller walker  Brace Applied: [place] LSO (set to LSO)  Additional Brace Ordered: No  Patient Position When Brace Applied: Seated  Bracing Recommendations: Recommendation (comment) (while OOB)  Education Provided: Yes  Patient Position at End of Consult: Supine; All needs within reach; Bed/Chair alarm activated  Nurse Communication: Nurse aware of consult, application of brace    Jcarlos Roblero, Restorative Technician

## 2024-12-20 NOTE — PROGRESS NOTES
Progress Note - Acute Pain   Name: Samantha Rodriguez 58 y.o. female I MRN: [id number]  Unit/Bed#: -01 I Date of Admission: 12/19/2024   Date of Service: 12/20/2024 I Hospital Day: 1    Assessment & Plan  Spinal stenosis of lumbar region with neurogenic claudication    Bipolar disorder (HCC)    Essential hypertension    Tardive dyskinesia    History of CVA (cerebrovascular accident)    Opioid-induced constipation    Severe episode of recurrent major depressive disorder, without psychotic features (HCC)    Acute blood loss anemia    Pain associated with surgical procedure  Continue acetominophen 675 Q6 scheduled  Continue robaxin 500 mg Q6 scheduled  Continue lyrica 150 mg Q8 scheduled  On hydromorphone PCA 0/.2/10  Continue oxycodone 5/10 Q4 mod/severe pain PRN    APS will continue to follow. Please contact Acute Pain Service - via SecureChat from [phone] with additional questions or concerns. See SecureChat or Live[employer] Mobileon for additional contacts and after hours information.     Subjective   Samantha Rodriguez is a 58 y.o. female POD1 from removal hardware L2-S1, posterior fusion L5-S1, revision instrumentation L2-pelvis, bilateral S2 alar screws.  Overnight patient with AMS currently on virtual 1:1.  On exam this morning, patient was somnolent.  Pain appears reasonably well controlled on current regimen, but history limited by patient drowsiness.  This could be related to residual anesthesia after lengthy procedure.  A psych consult is pending.    Pain History  Current pain location(s):  Pain Score: 10  Pain Location/Orientation: Location: Back  Pain Scale: Pain Assessment Tool: 0-10      Opioid requirement previous 24 hours:   Outpatient Morphine Milligram Equivalents Per Day       12/20/24 - 1/1/25 30 MME/Day      Order Name Dose Route Frequency Maximum MME/Day     morphine (MSIR) 15 mg tablet 15 mg Oral 2 times daily 30 MME/Day    Total Potential Morphine Milligram Equivalents Per Day 30 MME/Day      Calculation  Information          morphine (MSIR) 15 mg tablet    morphine 15 mg Tabs: maximum daily dose of 30 mg * morphine equivalence factor of 1 = 30 MME/Day                             1/2/25 and after None                     No Known Allergies  Objective :  Temp:  [97.2 °F (36.2 °C)-99.5 °F (37.5 °C)] 99.3 °F (37.4 °C)  HR:  [] 77  BP: ()/() 116/65  Resp:  [12-41] 17  SpO2:  [94 %-100 %] 94 %  O2 Device: Nasal cannula  Nasal Cannula O2 Flow Rate (L/min):  [3 L/min] 3 L/min    Physical Exam  Vitals and nursing note reviewed.   Constitutional:       Comments: Somnolent   HENT:      Head: Normocephalic.   Eyes:      Extraocular Movements: Extraocular movements intact.   Cardiovascular:      Rate and Rhythm: Normal rate and regular rhythm.   Pulmonary:      Breath sounds: Normal breath sounds.   Abdominal:      Palpations: Abdomen is soft.   Neurological:      General: No focal deficit present.            Lab Results: I have reviewed the following results:  Estimated Creatinine Clearance: 87.9 mL/min (by C-G formula based on SCr of 0.72 mg/dL).  Lab Results   Component Value Date    WBC 7.24 12/20/2024    WBC 4.50 10/26/2015    HGB 9.6 (L) 12/20/2024    HGB 14.9 10/26/2015    HCT 30.7 (L) 12/20/2024    HCT 41.9 10/26/2015     12/20/2024     10/26/2015         Component Value Date/Time     07/27/2015 1206    K 3.5 12/20/2024 0637    K 3.2 (L) 10/16/2024 2317     12/20/2024 0637     10/16/2024 2317    CO2 29 12/20/2024 0637    CO2 25 12/19/2024 1155    CO2 27 10/16/2024 2317    BUN 10 12/20/2024 0637    BUN 7 10/16/2024 2317    CREATININE 0.72 12/20/2024 0637    CREATININE 0.65 10/16/2024 2317         Component Value Date/Time    CALCIUM 8.2 (L) 12/20/2024 0637    CALCIUM 9.6 10/16/2024 2317    ALKPHOS 79 11/19/2024 0622    ALKPHOS 110 10/16/2024 2317    AST 17 11/19/2024 0622    AST 19 10/16/2024 2317    ALT 17 11/19/2024 0622    ALT 18 10/16/2024 2317    BILITOT 0.51  02/22/2014 0615    TP 6.2 (L) 11/19/2024 0622    TP 7.7 10/16/2024 2317    ALB 3.8 11/19/2024 0622    ALB 4.6 10/16/2024 2317

## 2024-12-20 NOTE — QUICK NOTE
Possible toxic encephalopathy due to anesthesia; as evidenced by altered mental status overnight which improved by this morning, now oriented X 3; requiring virtual 1:1 for 24 hours and neuro checks every 4 hours.

## 2024-12-20 NOTE — ASSESSMENT & PLAN NOTE
POD 0 from removal hardware L2-S1, posterior fusion L5-S1, revision instrumentation L2-pelvis, bilateral S2 alar screws. Doing well.     AMS overnight, routine psych consult placed for past psych hx. Could possibly be anesthesia; improved by this morning, now oriented X 3; requiring virtual 1:1 for 24 hours and neuro checks every 4 hours.     Acute blood loss anemia in the post-op setting; as evidenced by a drop in hemoglobin from 12.9 on 11/19/2024 to 9.6 post-op, with an EBL of 500 ml; requiring monitoring of hemoglobin levels.

## 2024-12-20 NOTE — UTILIZATION REVIEW
Initial Clinical Review    Elective Inpatient surgical procedure  Age/Sex: 58 y.o. female  Surgery Date: 12/19  Procedure: S/p Removal hardware L2-S1, posterior fusion L5-S1, navigated revision instrumentation L2 to pelvis; application of bilateral S2 alar screws and bone grafting of the disc at L5-S1 (N/A)   Anesthesia: General    POD#1 Progress Note: Altered mental status overnight.   Lumbar spine precautions. LSO when OOB for comfort  Virtual 1:1 overnight. Routine psychiatry consult for past psychiatry history.    Per Internal Med; Placed  on virtual 1:1 overnight 12/19-->12/20 as she was agitated and disoriented--most likely related to post op and not psych however can f/u psych input, wean as able   Currently on Iv Dilaudid PCA. Psychiatry consulted.   Pt states she was agitated overnight due to pain and being uncomfortable in bed. She wants to get up and move today.        Admission Orders: Date/Time/Statement:   Admission Orders (From admission, onward)       Ordered        12/19/24 1258  Inpatient Admission  Once            12/19/24 1257  Inpatient Admission  Once            Pending  Inpatient Admission  Once                          Orders Placed This Encounter   Procedures    Inpatient Admission     Standing Status:   Standing     Number of Occurrences:   1     Level of Care:   Med Surg [16]     Estimated length of stay:   More than 2 Midnights     Certification:   I certify that inpatient services are medically necessary for this patient for a duration of greater than two midnights. See H&P and MD Progress Notes for additional information about the patient's course of treatment.    Inpatient Admission     Standing Status:   Standing     Number of Occurrences:   1     Level of Care:   Med Surg [16]     Estimated length of stay:   More than 2 Midnights     Certification:   I certify that inpatient services are medically necessary for this patient for a duration of greater than two midnights. See H&P and MD  Progress Notes for additional information about the patient's course of treatment.     Diet: Regular  Mobility: OOB with Brace  DVT Prophylaxis: SCD    Medications/Pain Control:   Scheduled Medications:  acetaminophen, 650 mg, Oral, Q6H KESHIA  amLODIPine, 5 mg, Oral, Daily  ARIPiprazole, 10 mg, Oral, Daily  atorvastatin, 40 mg, Oral, After Dinner  busPIRone, 15 mg, Oral, TID  docusate sodium, 100 mg, Oral, BID  heparin (porcine), 5,000 Units, Subcutaneous, Q8H KESHIA  hydrOXYzine HCL, 25 mg, Oral, TID  lubiprostone, 8 mcg, Oral, BID  methocarbamol, 500 mg, Oral, Q6H KESHIA  pantoprazole, 40 mg, Oral, Early Morning  prazosin, 2 mg, Oral, HS  pregabalin, 150 mg, Oral, TID  senna, 1 tablet, Oral, Daily  sertraline, 200 mg, Oral, HS  traZODone, 300 mg, Oral, HS  Valbenazine Tosylate, 1 capsule, Oral, Daily      Continuous IV Infusions:  HYDROmorphone, , Intravenous, Continuous  lactated ringers, 50 mL/hr, Intravenous, Continuous      PRN Meds:  aluminum-magnesium hydroxide-simethicone, 30 mL, Oral, Q6H PRN  calcium carbonate, 1,000 mg, Oral, Daily PRN  ondansetron, 4 mg, Intravenous, Q6H PRN  oxyCODONE, 10 mg, Oral, Q4H PRN 12/20 x1  oxyCODONE, 5 mg, Oral, Q4H PRN      Vital Signs (last 3 days)       Date/Time Temp Pulse Resp BP MAP (mmHg) SpO2 Calculated FIO2 (%) - Nasal Cannula O2 Flow Rate (L/min) Nasal Cannula O2 Flow Rate (L/min) O2 Device Cardiac (WDL) Patient Position - Orthostatic VS Nestor Coma Scale Score Pain    12/20/24 10:49:47 99 °F (37.2 °C) 79 -- 114/67 83 95 % -- -- -- -- -- -- -- --    12/20/24 0800 -- -- -- -- -- -- -- -- -- None (Room air) -- -- 14 No Pain    12/20/24 07:32:29 99.3 °F (37.4 °C) 77 -- 116/65 82 94 % -- -- -- -- -- -- -- --    12/20/24 0514 -- -- -- -- -- -- -- -- -- -- -- -- -- 10 - Worst Possible Pain    12/20/24 05:10:21 99.5 °F (37.5 °C) 91 -- 138/86 103 97 % -- -- -- -- -- -- -- --    12/20/24 0200 98.5 °F (36.9 °C) -- -- -- -- -- -- -- -- -- -- -- -- --    12/20/24 01:51:46 98.5 °F  (36.9 °C) 91 -- 138/87 104 97 % -- -- -- -- -- -- -- --    12/20/24 0100 -- -- -- -- -- -- -- -- -- -- -- -- -- 9 12/19/24 2345 -- -- -- -- -- -- -- -- -- -- -- -- 14 --    12/19/24 21:27:50 97.9 °F (36.6 °C) 90 17 140/87 105 97 % -- -- -- -- -- -- -- --    12/19/24 20:26:11 -- 100 18 131/85 100 98 % -- -- -- -- -- -- -- --    12/19/24 1930 -- -- -- -- -- -- -- -- -- -- -- -- 14 --    12/19/24 1918 -- -- -- -- -- -- -- -- -- -- -- -- -- 5 12/19/24 1813 -- -- -- -- -- -- -- -- -- -- -- -- -- 5 12/19/24 18:03:50 97.2 °F (36.2 °C) 91 -- 159/107 124 98 % -- -- -- -- -- -- -- --    12/19/24 18:02:58 -- 95 16 156/105 122 98 % -- -- -- -- -- -- -- --    12/19/24 1730 -- 85 16 132/78 100 98 % -- -- -- -- -- -- 14 --    12/19/24 1700 -- 79 12 133/80 99 100 % -- -- -- -- -- -- 14 --    12/19/24 1645 97.9 °F (36.6 °C) 80 14 137/71 98 96 % 32 -- 3 L/min Nasal cannula WDL -- 14 --    12/19/24 1630 -- 81 12 157/91 121 100 % -- -- -- -- -- -- 14 5    12/19/24 1615 -- 92 21 160/100 118 98 % -- -- -- -- -- -- -- --    12/19/24 1600 -- 95 17 158/89 117 99 % 32 -- 3 L/min Nasal cannula WDL -- 14 --    12/19/24 1545 -- 107 20 168/73 105 99 % 32 -- 3 L/min Nasal cannula -- -- -- --    12/19/24 1530 -- 107 21 85/68 73 99 % 32 -- 3 L/min Nasal cannula -- -- -- --    12/19/24 1515 -- 97 41 86/50 61 99 % 32 -- 3 L/min Nasal cannula -- -- -- --    12/19/24 1500 -- 102 22 93/50 56 98 % 32 -- 3 L/min Nasal cannula -- -- -- --    12/19/24 1445 -- 107 21 134/86 103 98 % 32 -- 3 L/min Nasal cannula -- -- -- --    12/19/24 1430 -- 102 22 121/82 98 100 % 32 -- 3 L/min Nasal cannula -- -- -- --    12/19/24 1415 -- 97 20 119/86 98 95 % 32 -- 3 L/min Nasal cannula -- -- -- --    12/19/24 1409 -- -- -- -- -- -- -- -- -- -- -- -- -- 9    12/19/24 1400 -- 102 20 144/86 105 97 % -- -- -- None (Room air) -- -- -- No Pain    12/19/24 1345 -- 109 17 128/85 101 99 % -- 6 L/min -- Simple mask -- -- -- --    12/19/24 1337 98.3 °F (36.8 °C) 107 15  119/79 95 99 % -- 6 L/min -- Simple mask X -- -- --    12/19/24 0601 96.9 °F (36.1 °C) 71 20 149/93 -- 97 % -- -- -- None (Room air) -- Lying -- No Pain          Weight (last 2 days)       Date/Time Weight    12/19/24 0601 95.7 (211)            Pertinent Labs/Diagnostic Test Results:   Radiology:  XR spine lumbar 2 or 3 views injury   Final Interpretation by Rigo Cottrell MD (12/20 0833)      Fluoroscopy provided for procedure guidance.      Please refer to the separate procedure note for additional details.                  Workstation performed: [id number]         XR spine lumbar 2 or 3 views injury    (Results Pending)    VAS VENOUS DUPLEX - LOWER LIMB BILATERAL    (Results Pending)     Cardiology:  No orders to display     GI:  No orders to display       Results from last 7 days   Lab Units 12/19/24  0616   SARS-COV-2  Negative     Results from last 7 days   Lab Units 12/20/24  0637 12/19/24  1155 12/19/24  1055 12/19/24  0852   WBC Thousand/uL 7.24  --   --   --    HEMOGLOBIN g/dL 9.6*  --   --   --    I STAT HEMOGLOBIN g/dl  --  12.6 12.2 11.2*   HEMATOCRIT % 30.7*  --   --   --    HEMATOCRIT, ISTAT %  --  37 36 33*   PLATELETS Thousands/uL 259  --   --   --    TOTAL NEUT ABS Thousands/µL 5.58  --   --   --          Results from last 7 days   Lab Units 12/20/24  0637 12/19/24  1155 12/19/24  1055 12/19/24  0852   SODIUM mmol/L 141  --   --   --    POTASSIUM mmol/L 3.5  --   --   --    CHLORIDE mmol/L 105  --   --   --    CO2 mmol/L 29  --   --   --    CO2, I-STAT mmol/L  --  25 26 24   ANION GAP mmol/L 7  --   --   --    BUN mg/dL 10  --   --   --    CREATININE mg/dL 0.72  --   --   --    EGFR ml/min/1.73sq m 91  --   --   --    CALCIUM mg/dL 8.2*  --   --   --    CALCIUM, IONIZED, ISTAT mmol/L  --  1.12 1.13 1.08*         Results from last 7 days   Lab Units 12/19/24  1412   POC GLUCOSE mg/dl 174*     Results from last 7 days   Lab Units 12/20/24  0637   GLUCOSE RANDOM mg/dL 124             No results  "found for: \"BETA-HYDROXYBUTYRATE\"           Results from last 7 days   Lab Units 12/19/24  1155 12/19/24  1055 12/19/24  0852   I STAT BASE EXC mmol/L 0 0 -1   I STAT O2 SAT % 98* 97* 100*   ISTAT PH ART  7.428 7.393 7.421   I STAT ART PCO2 mm HG 36.8 40.7 35.9*   I STAT ART PO2 mm .0 90.0 194.0*   I STAT ART HCO3 mmol/L 24.3 24.8 23.3                                                     Results from last 7 days   Lab Units 12/19/24  0932   UNIT PRODUCT CODE  [id number]  [id number]   UNIT NUMBER  [id number]  [id number]   UNITABO  O  O   UNITRH  NEG  NEG   CROSSMATCH  Compatible  Compatible   UNIT DISPENSE STATUS  Crossmatched  Crossmatched   UNIT PRODUCT VOL mL 350  350                                                                           Network Utilization Review Department  ATTENTION: Please call with any questions or concerns to [phone] and carefully listen to the prompts so that you are directed to the right person. All voicemails are confidential.   For Discharge needs, contact Care Management DC Support Team at [phone] opt. 2  Send all requests for admission clinical reviews, approved or denied determinations and any other requests to dedicated fax number below belonging to the campus where the patient is receiving treatment. List of dedicated fax numbers for the Facilities:  FACILITY NAME UR FAX NUMBER   ADMISSION DENIALS (Administrative/Medical Necessity) [phone]   DISCHARGE SUPPORT TEAM (NETWORK) [phone]   PARENT CHILD HEALTH (Maternity/NICU/Pediatrics) [phone]   [facility] [phone]   [facility] [phone]   [facility] [phone]   [facility] [phone]   [facility] [phone]   [facility] [phone]   [facility] [phone]   [facility] - " [facility] [phone]   [facility] [phone]   [facility] [phone]   [facility] [phone]   [facility] [phone]

## 2024-12-20 NOTE — ASSESSMENT & PLAN NOTE
POD 0 from removal hardware L2-S1, posterior fusion L5-S1, revision instrumentation L2-pelvis, bilateral S2 alar screws. Doing well.

## 2024-12-20 NOTE — PLAN OF CARE
Problem: PHYSICAL THERAPY ADULT  Goal: Performs mobility at highest level of function for planned discharge setting.  See evaluation for individualized goals.  Description: Treatment/Interventions: Functional transfer training, LE strengthening/ROM, Elevations, Therapeutic exercise, Endurance training, Patient/family training, Equipment eval/education, Bed mobility, Gait training, Spoke to nursing, OT  Equipment Recommended: Walker       See flowsheet documentation for full assessment, interventions and recommendations.  Note: Prognosis: Good  Problem List: Decreased strength, Decreased endurance, Impaired balance, Decreased mobility, Pain, Orthopedic restrictions  Assessment: Pt is 58 y.o. female seen for PT evaluation s/p admit to [facility] on 12/19/2024. Two pt identifiers were used to confirm. Pt presented for scheduled removal hardware L2-S1, posterior fusion L5-S1, revision instrumentation L2-pelvis, bilateral S2 alar screws which was performed on 12/19/24 .  Pt was admitted with a primary dx of: spinal stenosis of lumbar region with neurogenic claudication s/p removal hardware L2-S1, posterior fusion L5-S1, revision instrumentation L2-pelvis, bilateral S2 alar screws .  PT now consulted for assessment of mobility and d/c needs. Pt with Ambulate orders.  Pts current co morbidities affecting treatment include: see PMH above. Pts current clinical presentation is Unstable/ Unpredictable (high complexity) due to Ongoing medical management for primary dx, Increased reliance on more restrictive AD compared to baseline, Decreased activity tolerance compared to baseline, Fall risk, Increased assistance needed from caregiver at current time, Spinal precautions at current time, Continuous pulse oximetry monitoring , s/p surgical intervention  .  Upon evaluation, pt currently is requiring Mod Ax1 for bed mobility; Mod Ax1 for transfers and Mod Ax1 for ambulation w/ RW. Pt presents at PT eval functioning  below baseline and currently w/ overall mobility deficits 2* to: BLE weakness, decreased ROM, impaired balance, decreased endurance, gait deviations, pain, decreased activity tolerance compared to baseline, fall risk, spinal precautions.  At conclusion of PT session pt returned back in chair and chair alarm engaged with phone and call bell within reach and with virtual 1:1. Pt denies any further questions at this time. PT is currently recommending Level II resource intensity.  Pt/ family agreeable to plan and goals as stated on evaluation. PT will continue to follow during hospital stay.        Rehab Resource Intensity Level, PT: II (Moderate Resource Intensity)    See flowsheet documentation for full assessment.

## 2024-12-20 NOTE — OCCUPATIONAL THERAPY NOTE
Occupational Therapy Evaluation     Patient Name: Samantha Rodriguez  Today's Date: 12/20/2024  Problem List  Principal Problem:    Spinal stenosis of lumbar region with neurogenic claudication  Active Problems:    Bipolar disorder (HCC)    Essential hypertension    Tardive dyskinesia    History of CVA (cerebrovascular accident)    Opioid-induced constipation    Severe episode of recurrent major depressive disorder, without psychotic features (HCC)    Acute blood loss anemia    Pain associated with surgical procedure    Past Medical History  Past Medical History:   Diagnosis Date    Anxiety     Arthritis     left knee    Arthritis of right knee 10/06/2020    At risk for falls     Bipolar 2 disorder (HCC)     FOLLOWS WITH PSYCHIATRIST. CONTINUE LAMOTRIGINE; RESOLVED: [id number]    Depression     Dysphagia     per [facility] facility paperwork    Familial tremor     both hands    Fibromyalgia     LAST ASSESSED: 08DEC2017    GERD (gastroesophageal reflux disease)     Hearing aid worn     left ear    [place] (hard of hearing)     left ear    Hyperlipidemia     Hypertension     Impaired speech     Left-sided weakness     Lumbar disc disease with radiculopathy 02/02/2018    Memory loss of unknown cause     long and short term    Migraine     Multiple closed fractures of metatarsal bone of right foot 05/02/2021    Obesity     Obesity, Class II, BMI 35-39.9     Osteoarthritis of both hips 10/31/2016    Osteoarthritis of knee 02/20/2013    Description: Continue Tylenol and Naproxen. Encourage exercise and weight loss. Patient refused physical therapy. I will refer the patient back to Orthopedics.    Overactive bladder     Panic attack     Patellofemoral disorder of both knees 05/01/2020    Post traumatic stress disorder     Primary localized osteoarthritis of both knees 06/16/2017    Primary osteoarthritis of both knees 05/01/2020    S/P insertion of spinal cord stimulator     no remote    S/P total knee arthroplasty, right 03/10/2022     Sacroiliitis (HCC) 2017    Seasonal allergies     Seizure-like activity (HCC) 2022    Seizures (HCC)     possible seizure like activity    Small bowel obstruction (HCC) 2023    Status post total knee replacement, left 2022    Stroke (HCC)     questionable stroke     Tardive dyskinesia     PATIENT STATES    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  CONT ASA 81 MG DAILY; RESOLVED: [id number]    Urinary incontinence     Uses walker     Wears dentures     partial lower / full upper- doesnt wear    Wears glasses      Past Surgical History  Past Surgical History:   Procedure Laterality Date    BACK SURGERY       SECTION      COLONOSCOPY      RESOLVED: 2016    EAR SURGERY      EGD      HYSTERECTOMY      LUMBAR FUSION N/A 2024    Procedure: Removal hardware L2-S1, posterior fusion L5-S1, navigated revision instrumentation L2 to pelvis; application of bilateral S2 alar screws and bone grafting of the disc at L5-S1;  Surgeon: Yenni Stevens MD;  [facility]: [facility];  Service: Orthopedics    MYRINGOTOMY W/ TUBES Left     NECK SURGERY  2019    OR ARTHRP KNE CONDYLE&PLATU MEDIAL&LAT COMPARTMENTS Right 2022    Procedure: ARTHROPLASTY KNEE TOTAL;  Surgeon: Chandni Tuttle DO;  Location[facility];  Service: Orthopedics    OR ARTHRP KNE CONDYLE&PLATU MEDIAL&LAT COMPARTMENTS Left 2022    Procedure: ARTHROPLASTY KNEE TOTAL;  Surgeon: Chandni Tuttle DO;  [facility];  Service: Orthopedics    OR CYSTOURETHROSCOPY N/A 2016    Procedure: CYSTOSCOPY, BOTOX INJECTION;  Surgeon: Abraham Teague MD;  [facility];  Service: Gynecology    OR INSJ/RPLCMT SPINAL NPG/RCVR POCKET CRTJ&CONNJ Right 02/10/2021    Procedure: REPLACEMENT IMPLANTABLE PULSE GENERATOR DORSAL SPINAL COLUMN STIMULATOR, RIGHT;  Surgeon: Carlos Alberto Jacome MD;  Location[facility];  Service: Neurosurgery    OR PRQ IMPLTJ NSTIM ELECTRODE ARRAY  EPIDURAL Right 07/28/2020    Procedure: INSERTION THORACIC DORSAL COLUMN SPINAL CORD STIMULATOR PERCUTANEOUS W IMPLANTABLE PULSE GENERATOR, RIGHT;  Surgeon: Carlos Alberto Jacome MD;  Location: [facility];  Service: Neurosurgery    TONSILLECTOMY      TUBAL LIGATION  1986    UPPER GASTROINTESTINAL ENDOSCOPY  09/2020 12/20/24 1016   OT Last Visit   OT Visit Date 12/20/24   Note Type   Note type Evaluation   Pain Assessment   Pain Assessment Tool 0-10   Pain Score 7   Pain Location/Orientation Location: Back   Hospital Pain Intervention(s) Ambulation/increased activity;Repositioned   Restrictions/Precautions   Weight Bearing Precautions Per Order No   Braces or Orthoses (S)  LSO   Other Precautions Cognitive;Chair Alarm;Bed Alarm;Multiple lines;Fall Risk;Pain;1:1;Spinal precautions  (virtual 1:1)   Home Living   Type of Home House   Home Layout Two level;Performs ADLs on one level;Able to live on main level with bedroom/bathroom   Home Equipment Walker  (rollator)   Additional Comments Pt ? historian at this time, information obtained from pt interview   Prior Function   Level of [place] Independent with ADLs;Independent with functional mobility;Independent with IADLS   Lives With Daughter   Receives Help From Family   IADLs Independent with meal prep;Independent with medication management   Falls in the last 6 months 0   Comments Pt ? historian at this time, information obtained from pt interview   Lifestyle   Autonomy Pt ? historian, pt reports (I) with ADLs, IADLs, and functional mobility   Reciprocal Relationships family   ADL   Where Assessed Edge of bed   Eating Assistance 5  Supervision/Setup   Grooming Assistance 4  Minimal Assistance   UB Bathing Assistance 4  Minimal Assistance   LB Bathing Assistance 2  Maximal Assistance   UB Dressing Assistance 4  Minimal Assistance   LB Dressing Assistance 2  Maximal Assistance   Toileting Assistance  2  Maximal Assistance   Functional Assistance 3  Moderate  Assistance   Bed Mobility   Supine to Sit 3  Moderate assistance   Additional items Assist x 1;Increased time required;Verbal cues   Transfers   Sit to Stand 3  Moderate assistance   Additional items Assist x 1;Increased time required;Verbal cues   Stand to Sit 3  Moderate assistance   Additional items Assist x 1;Increased time required;Verbal cues   Additional Comments with rw   Functional Mobility   Functional Mobility 3  Moderate assistance   Additional Comments Pt requires MOD A to take steps with rw   Additional items Rolling walker   Balance   Static Sitting Fair -   Dynamic Sitting Fair -   Static Standing Poor +   Dynamic Standing Poor   Ambulatory Poor   Activity Tolerance   Activity Tolerance Patient limited by fatigue;Patient limited by pain   Medical Staff Made Aware PT   Nurse Made Aware RN Cleared   RUE Assessment   RUE Assessment WFL   LUE Assessment   LUE Assessment WFL   Hand Function   Gross Motor Coordination Functional   Fine Motor Coordination Functional   Cognition   Overall Cognitive Status Impaired   Arousal/Participation Alert;Responsive   Attention Attends with cues to redirect   Orientation Level Oriented to person;Oriented to place;Oriented to time;Oriented to situation   Memory Decreased recall of precautions;Decreased recall of recent events;Decreased short term memory   Following Commands Follows one step commands with increased time or repetition   Comments Pt requires cues throughout session for redirection to task   Assessment   Limitation Decreased ADL status;Decreased Safe judgement during ADL;Decreased cognition;Decreased endurance;Decreased self-care trans;Decreased high-level ADLs   Prognosis Good   Assessment Pt is a 57 y/o female that was [facility]/19/20[facility] with spinal stenosis of lumbar region. Pt s/p L2-S1 hardware removal with new L5-S1 fusion. Pt with LSO and spinal precautions. Pt with active OT orders and activity orders. Pt  has a past medical  history of Anxiety, Arthritis, Arthritis of right knee, At risk for falls, Bipolar 2 disorder (HCC), Depression, Dysphagia, Familial tremor, Fibromyalgia, GERD (gastroesophageal reflux disease), Hearing aid worn, [place] (hard of hearing), Hyperlipidemia, Hypertension, Impaired speech, Left-sided weakness, Lumbar disc disease with radiculopathy, Memory loss of unknown cause, Migraine, Multiple closed fractures of metatarsal bone of right foot, Obesity, Obesity, Class II, BMI 35-39.9, Osteoarthritis of both hips, Osteoarthritis of knee, Overactive bladder, Panic attack, Patellofemoral disorder of both knees, Post traumatic stress disorder, Primary localized osteoarthritis of both knees, Primary osteoarthritis of both knees, S/P insertion of spinal cord stimulator, S/P total knee arthroplasty, right, Sacroiliitis (HCC), Seasonal allergies, Seizure-like activity ([facility]), Seizures ([facility]), Small bowel obstruction ([facility]), Status post total knee replacement, left, Stroke ([facility]), Tardive dyskinesia, Thrombosis of cerebral arteries, Urinary incontinence, Uses walker, Wears dentures, and Wears glasses. Pt is a ? Historian, pt reports living with daughter in a two level house with [facility]. Prior to admission pt (I) ADLs, IADLs, and functional mobility. Pt currently requires MIN A to complete grooming and UB ADLs, MOD A to complete functional transfers and take steps with rw, and MAX A to complete LB ADLs and toileting. Pt limited by decreased ADL status, functional transfers, functional mobility, and activity tolerance. Pt supine in bed at begning of session, pt seated in bedside chair at end of session with alarm set and items within reach. The patient's raw score on the -PAC Daily Activity Inpatient Short Form is 15. A raw score of less than 19 suggests the patient may benefit from discharge to post-acute rehabilitation services. Please refer to the recommendation of the Occupational Therapist for safe discharge planning. Recommend  Level II moderate intensity OT services at d/c to maximize pt function.   Goals   Patient Goals to rest   LTG Time Frame 10-14   Plan   Treatment Interventions ADL retraining;Functional transfer training;UE strengthening/ROM;Endurance training;Cognitive reorientation;Patient/family training;Equipment evaluation/education;Compensatory technique education;Continued evaluation;Energy conservation;Activityengagement   Goal Expiration Date 01/03/25   OT Frequency 2-3x/wk   Discharge Recommendation   Rehab Resource Intensity Level, OT II (Moderate Resource Intensity)   AM-PAC Daily Activity Inpatient   Lower Body Dressing 2   Bathing 2   Toileting 2   Upper Body Dressing 3   Grooming 3   Eating 3   Daily Activity Raw Score 15   Daily Activity Standardized Score (Calc for Raw Score >=11) 34.69   AM-PAC Applied Cognition Inpatient   Following a Speech/Presentation 3   Understanding Ordinary Conversation 4   Taking Medications 3   Remembering Where Things Are Placed or Put Away 3   Remembering List of 4-5 Errands 2   Taking Care of Complicated Tasks 2   Applied Cognition Raw Score 17   Applied Cognition Standardized Score 36.52   End of Consult   Education Provided Yes   Patient Position at End of Consult Bedside chair;Bed/Chair alarm activated;All needs within reach   Nurse Communication Nurse aware of consult     Goals:    Pt will complete functional transfers with MOD IND and appropriate AD to maximize pt safety.    Pt will complete bed mobility with MOD IND  to maximize pt safety.    Pt will complete grooming tasks with MOD IND to maximize pt independence.    Pt will complete LB ADLs with MOD IND  to maximize pt independence.    Pt will complete UB ADLs with MOD IND to maximize pt independence.    Pt will complete toileting with MOD IND to maximize pt independence.    Pt will complete functional household distance mobility with MOD IND and appropriate AD to maximize pt safety.    Pt will complete simulated IADL tasks  with MOD IND to maximize pt independence.     Pt will be able to tolerate 30 minutes of functional activity during therapy session.    Pt will follow multistep directions with increased time or repeating directions 2X to maximize pt safety.     Pt will participate in continued cognitive evaluation for safe discharge planning.        ELTON Ceballos, OTR/L

## 2024-12-21 ENCOUNTER — APPOINTMENT (INPATIENT)
Dept: RADIOLOGY | Facility: HOSPITAL | Age: 59
DRG: 447 | End: 2024-12-21
Payer: COMMERCIAL

## 2024-12-21 LAB
ANION GAP SERPL CALCULATED.3IONS-SCNC: 6 MMOL/L (ref 4–13)
BASOPHILS # BLD AUTO: 0.04 THOUSANDS/ÂΜL (ref 0–0.1)
BASOPHILS NFR BLD AUTO: 1 % (ref 0–1)
BUN SERPL-MCNC: 9 MG/DL (ref 5–25)
CALCIUM SERPL-MCNC: 8.2 MG/DL (ref 8.4–10.2)
CHLORIDE SERPL-SCNC: 105 MMOL/L (ref 96–108)
CO2 SERPL-SCNC: 28 MMOL/L (ref 21–32)
CREAT SERPL-MCNC: 0.67 MG/DL (ref 0.6–1.3)
EOSINOPHIL # BLD AUTO: 0.28 THOUSAND/ÂΜL (ref 0–0.61)
EOSINOPHIL NFR BLD AUTO: 4 % (ref 0–6)
ERYTHROCYTE [DISTWIDTH] IN BLOOD BY AUTOMATED COUNT: 13.6 % (ref 11.6–15.1)
GFR SERPL CREATININE-BSD FRML MDRD: 95 ML/MIN/1.73SQ M
GLUCOSE SERPL-MCNC: 103 MG/DL (ref 65–140)
HCT VFR BLD AUTO: 29 % (ref 34.8–46.1)
HGB BLD-MCNC: 9.1 G/DL (ref 11.5–15.4)
IMM GRANULOCYTES # BLD AUTO: 0.02 THOUSAND/UL (ref 0–0.2)
IMM GRANULOCYTES NFR BLD AUTO: 0 % (ref 0–2)
LYMPHOCYTES # BLD AUTO: 0.92 THOUSANDS/ÂΜL (ref 0.6–4.47)
LYMPHOCYTES NFR BLD AUTO: 14 % (ref 14–44)
MCH RBC QN AUTO: 28.7 PG (ref 26.8–34.3)
MCHC RBC AUTO-ENTMCNC: 31.4 G/DL (ref 31.4–37.4)
MCV RBC AUTO: 92 FL (ref 82–98)
MONOCYTES # BLD AUTO: 0.63 THOUSAND/ÂΜL (ref 0.17–1.22)
MONOCYTES NFR BLD AUTO: 10 % (ref 4–12)
NEUTROPHILS # BLD AUTO: 4.56 THOUSANDS/ÂΜL (ref 1.85–7.62)
NEUTS SEG NFR BLD AUTO: 71 % (ref 43–75)
NRBC BLD AUTO-RTO: 0 /100 WBCS
PLATELET # BLD AUTO: 244 THOUSANDS/UL (ref 149–390)
PMV BLD AUTO: 10.7 FL (ref 8.9–12.7)
POTASSIUM SERPL-SCNC: 3.4 MMOL/L (ref 3.5–5.3)
RBC # BLD AUTO: 3.17 MILLION/UL (ref 3.81–5.12)
SODIUM SERPL-SCNC: 139 MMOL/L (ref 135–147)
WBC # BLD AUTO: 6.45 THOUSAND/UL (ref 4.31–10.16)

## 2024-12-21 PROCEDURE — 99232 SBSQ HOSP IP/OBS MODERATE 35: CPT | Performed by: NURSE PRACTITIONER

## 2024-12-21 PROCEDURE — NC001 PR NO CHARGE: Performed by: ORTHOPAEDIC SURGERY

## 2024-12-21 PROCEDURE — 80048 BASIC METABOLIC PNL TOTAL CA: CPT

## 2024-12-21 PROCEDURE — 99232 SBSQ HOSP IP/OBS MODERATE 35: CPT | Performed by: STUDENT IN AN ORGANIZED HEALTH CARE EDUCATION/TRAINING PROGRAM

## 2024-12-21 PROCEDURE — 72100 X-RAY EXAM L-S SPINE 2/3 VWS: CPT

## 2024-12-21 PROCEDURE — 85025 COMPLETE CBC W/AUTO DIFF WBC: CPT

## 2024-12-21 RX ORDER — POTASSIUM CHLORIDE 1500 MG/1
40 TABLET, EXTENDED RELEASE ORAL ONCE
Status: COMPLETED | OUTPATIENT
Start: 2024-12-21 | End: 2024-12-21

## 2024-12-21 RX ADMIN — ATORVASTATIN CALCIUM 40 MG: 40 TABLET, FILM COATED ORAL at 17:28

## 2024-12-21 RX ADMIN — ACETAMINOPHEN 650 MG: 325 TABLET, FILM COATED ORAL at 06:32

## 2024-12-21 RX ADMIN — DOCUSATE SODIUM 100 MG: 100 CAPSULE, LIQUID FILLED ORAL at 17:28

## 2024-12-21 RX ADMIN — OXYCODONE HYDROCHLORIDE 10 MG: 5 TABLET ORAL at 21:41

## 2024-12-21 RX ADMIN — HEPARIN SODIUM 5000 UNITS: 5000 INJECTION, SOLUTION INTRAVENOUS; SUBCUTANEOUS at 21:43

## 2024-12-21 RX ADMIN — HEPARIN SODIUM 5000 UNITS: 5000 INJECTION, SOLUTION INTRAVENOUS; SUBCUTANEOUS at 06:32

## 2024-12-21 RX ADMIN — LUBIPROSTONE 8 MCG: 8 CAPSULE ORAL at 21:44

## 2024-12-21 RX ADMIN — BUSPIRONE HYDROCHLORIDE 15 MG: 10 TABLET ORAL at 15:33

## 2024-12-21 RX ADMIN — AMLODIPINE BESYLATE 5 MG: 5 TABLET ORAL at 08:53

## 2024-12-21 RX ADMIN — METHOCARBAMOL 500 MG: 500 TABLET ORAL at 17:28

## 2024-12-21 RX ADMIN — BUSPIRONE HYDROCHLORIDE 15 MG: 10 TABLET ORAL at 08:53

## 2024-12-21 RX ADMIN — ACETAMINOPHEN 650 MG: 325 TABLET, FILM COATED ORAL at 17:28

## 2024-12-21 RX ADMIN — HYDROXYZINE HYDROCHLORIDE 25 MG: 25 TABLET, FILM COATED ORAL at 08:53

## 2024-12-21 RX ADMIN — METHOCARBAMOL 500 MG: 500 TABLET ORAL at 21:42

## 2024-12-21 RX ADMIN — LUBIPROSTONE 8 MCG: 8 CAPSULE ORAL at 08:54

## 2024-12-21 RX ADMIN — PREGABALIN 150 MG: 75 CAPSULE ORAL at 21:42

## 2024-12-21 RX ADMIN — BUSPIRONE HYDROCHLORIDE 15 MG: 10 TABLET ORAL at 21:41

## 2024-12-21 RX ADMIN — HYDROXYZINE HYDROCHLORIDE 25 MG: 25 TABLET, FILM COATED ORAL at 21:42

## 2024-12-21 RX ADMIN — PREGABALIN 150 MG: 75 CAPSULE ORAL at 08:53

## 2024-12-21 RX ADMIN — SERTRALINE 200 MG: 100 TABLET, FILM COATED ORAL at 21:41

## 2024-12-21 RX ADMIN — PANTOPRAZOLE SODIUM 40 MG: 40 TABLET, DELAYED RELEASE ORAL at 06:32

## 2024-12-21 RX ADMIN — HYDROXYZINE HYDROCHLORIDE 25 MG: 25 TABLET, FILM COATED ORAL at 15:33

## 2024-12-21 RX ADMIN — ARIPIPRAZOLE 10 MG: 10 TABLET ORAL at 08:54

## 2024-12-21 RX ADMIN — SENNOSIDES 8.6 MG: 8.6 TABLET, FILM COATED ORAL at 08:54

## 2024-12-21 RX ADMIN — DOCUSATE SODIUM 100 MG: 100 CAPSULE, LIQUID FILLED ORAL at 08:53

## 2024-12-21 RX ADMIN — METHOCARBAMOL 500 MG: 500 TABLET ORAL at 06:32

## 2024-12-21 RX ADMIN — PREGABALIN 150 MG: 75 CAPSULE ORAL at 15:33

## 2024-12-21 RX ADMIN — ACETAMINOPHEN 650 MG: 325 TABLET, FILM COATED ORAL at 21:42

## 2024-12-21 RX ADMIN — HEPARIN SODIUM 5000 UNITS: 5000 INJECTION, SOLUTION INTRAVENOUS; SUBCUTANEOUS at 13:23

## 2024-12-21 RX ADMIN — ONDANSETRON 4 MG: 2 INJECTION INTRAMUSCULAR; INTRAVENOUS at 15:33

## 2024-12-21 RX ADMIN — POTASSIUM CHLORIDE 40 MEQ: 1500 TABLET, EXTENDED RELEASE ORAL at 09:18

## 2024-12-21 RX ADMIN — ACETAMINOPHEN 650 MG: 325 TABLET, FILM COATED ORAL at 00:40

## 2024-12-21 RX ADMIN — OXYCODONE HYDROCHLORIDE 5 MG: 5 TABLET ORAL at 10:54

## 2024-12-21 RX ADMIN — OXYCODONE HYDROCHLORIDE 10 MG: 5 TABLET ORAL at 15:32

## 2024-12-21 RX ADMIN — PRAZOSIN HYDROCHLORIDE 2 MG: 2 CAPSULE ORAL at 21:44

## 2024-12-21 RX ADMIN — METHOCARBAMOL 500 MG: 500 TABLET ORAL at 00:40

## 2024-12-21 RX ADMIN — ACETAMINOPHEN 650 MG: 325 TABLET, FILM COATED ORAL at 11:00

## 2024-12-21 RX ADMIN — TRAZODONE HYDROCHLORIDE 300 MG: 100 TABLET ORAL at 21:42

## 2024-12-21 RX ADMIN — METHOCARBAMOL 500 MG: 500 TABLET ORAL at 11:00

## 2024-12-21 NOTE — PROGRESS NOTES
Progress Note - Acute Pain   Name: Samantha Rodriguez 58 y.o. female I MRN: [id number]  Unit/Bed#: -01 I Date of Admission: 12/19/2024   Date of Service: 12/21/2024 I Hospital Day: 2    Assessment & Plan  Spinal stenosis of lumbar region with neurogenic claudication    Bipolar disorder (HCC)    Essential hypertension    Tardive dyskinesia    History of CVA (cerebrovascular accident)    Opioid-induced constipation    Severe episode of recurrent major depressive disorder, without psychotic features (HCC)    Acute blood loss anemia    Pain associated with surgical procedure    Upon bedside evaluation, Samantha was sleeping in the chair. She is arousable with A/O*3. Dilaudid PCA has been discontinued. I encouraged her to use PO oxycodone PRN for pain control. I did not observe any excessive somnolence.     Continue acetominophen 675 Q6 scheduled  Continue robaxin 500 mg Q6 scheduled  Continue lyrica 150 mg Q8 scheduled  Discontinue hydromorphone PCA 0/.2/10  Continue oxycodone 5/10 Q4 mod/severe pain PRN  Toxic encephalopathy      APS will continue to follow. Please contact Acute Pain Service - via SecureChat from [phone] with additional questions or concerns. See [email or web address] or Cashually for additional contacts and after hours information.     Subjective   Samantha Rodriguez is a 58 y.o. female POD1 from removal hardware L2-S1, posterior fusion L5-S1, revision instrumentation L2-pelvis, bilateral S2 alar screws. Overnight patient with AMS currently on virtual 1:1      Pain History  Current pain location(s):  Pain Score: 8  Pain Location/Orientation: Location: Back  Pain Scale: Pain Assessment Tool: FLACC  24 hour history: See above    Opioid requirement previous 24 hours: PO oxycodone 5mg  Meds/Allergies   all current active meds have been reviewed  No Known Allergies  Objective :  Temp:  [97.6 °F (36.4 °C)-100.5 °F (38.1 °C)] 99.9 °F (37.7 °C)  HR:  [85-95] 89  BP: (111-144)/(61-92) 144/92  Resp:  [16-20] 16  SpO2:  [94  %-97 %] 97 %  O2 Device: None (Room air)    Physical Exam  Constitutional:       Appearance: Normal appearance.   HENT:      Head: Atraumatic.      Mouth/Throat:      Mouth: Mucous membranes are dry.   Eyes:      Pupils: Pupils are equal, round, and reactive to light.   Cardiovascular:      Rate and Rhythm: Normal rate and regular rhythm.      Pulses: Normal pulses.   Pulmonary:      Effort: Pulmonary effort is normal.   Abdominal:      Palpations: Abdomen is soft.   Musculoskeletal:         General: Tenderness (Back) present.   Skin:     General: Skin is dry.   Neurological:      General: No focal deficit present.      Mental Status: She is alert and oriented to person, place, and time.   Psychiatric:         Mood and Affect: Mood normal.            Lab Results: I have reviewed the following results:  Estimated Creatinine Clearance: 94.4 mL/min (by C-G formula based on SCr of 0.67 mg/dL).  Lab Results   Component Value Date    WBC 6.45 12/21/2024    WBC 4.50 10/26/2015    HGB 9.1 (L) 12/21/2024    HGB 14.9 10/26/2015    HCT 29.0 (L) 12/21/2024    HCT 41.9 10/26/2015     12/21/2024     10/26/2015         Component Value Date/Time     07/27/2015 1206    K 3.4 (L) 12/21/2024 0634    K 3.2 (L) 10/16/2024 2317     12/21/2024 0634     10/16/2024 2317    CO2 28 12/21/2024 0634    CO2 25 12/19/2024 1155    CO2 27 10/16/2024 2317    BUN 9 12/21/2024 0634    BUN 7 10/16/2024 2317    CREATININE 0.67 12/21/2024 0634    CREATININE 0.65 10/16/2024 2317         Component Value Date/Time    CALCIUM 8.2 (L) 12/21/2024 0634    CALCIUM 9.6 10/16/2024 2317    ALKPHOS 79 11/19/2024 0622    ALKPHOS 110 10/16/2024 2317    AST 17 11/19/2024 0622    AST 19 10/16/2024 2317    ALT 17 11/19/2024 0622    ALT 18 10/16/2024 2317    BILITOT 0.51 02/22/2014 0615    TP 6.2 (L) 11/19/2024 0622    TP 7.7 10/16/2024 2317    ALB 3.8 11/19/2024 0622    ALB 4.6 10/16/2024 2317       Imaging Results Review: No pertinent  imaging studies reviewed.  Other Study Results Review: No additional pertinent studies reviewed.

## 2024-12-21 NOTE — ASSESSMENT & PLAN NOTE
Status post removal L2-S1 hardware, with new L5-S1 fusion performed by Dr. Stevens on 12/19 without complication.  Primary management per orthopedic surgery team  WBAT   LSO when oob for comfort  On subcu heparin for DVT prophylaxis 12/20  Analgesia per primary/ Acute Pain service   Transitioned from pca pump to po oxycodone prn   Primary has ordered venous duplex for Sunday 12/22 to eval for DVT post op

## 2024-12-21 NOTE — PROGRESS NOTES
Progress Note - Orthopedics   Name: Samantha Rodriguez 58 y.o. female I MRN: [id number]  Unit/Bed#: -01 I Date of Admission: 12/19/2024   Date of Service: 12/21/2024 I Hospital Day: 2    Assessment & Plan  Spinal stenosis of lumbar region with neurogenic claudication  POD 2 from removal hardware L2-S1, posterior fusion L5-S1, revision instrumentation L2-pelvis, bilateral S2 alar screws. Doing well.     No acute events overnight.  Acute blood loss anemia  Acute blood loss anemia in the post-op setting; as evidenced by a drop in hemoglobin from 12.9 on 11/19/2024 to 9.6 post-op, with an EBL of 500 ml; requiring monitoring of hemoglobin levels.   WBAT  Lumbar spine precautions  LSO when OOB for comfort  F/u routine psych consult  Will monitor for ABLA and administer IVF/prbc as indicated for Greater than 2 gram drop or Hgb < 7   PT/OT  Incentive spirometry   Pain control  DVT ppx SQH starting 12/20  Diet regular    Subjective   58 y.o.female doing well post op. No acute events, no new complaints. Pain well controlled though still having pain when laying supine. Denies fevers, chills, CP, SOB, N/V, numbness or tingling. Patient has not had BM since surgery but states she has had issues with this preoperatively as well. Pt is more oriented this am, oriented x3    Objective :  Temp:  [97.6 °F (36.4 °C)-100.5 °F (38.1 °C)] 100.5 °F (38.1 °C)  HR:  [77-95] 95  BP: (111-133)/(61-81) 133/81  Resp:  [18-20] 18  SpO2:  [94 %-96 %] 96 %  O2 Device: None (Room air)    Physical Exam  Lumbar exam  Tender to palpation around the incision of lumbar spine  Sensation intact to light touch bilaterally L2-S1 dermatomes  Left motor: Hip flexion 4/5, knee extension 4/5, knee flexion 4/5, dorsiflexion 3+/5, plantarflexion 4/5, EHL 3/5  Right motor: Hip flexion 3+/5, knee extension 4/5, knee flexion 4/5, dorsiflexion 3+/5, plantarflexion 4/5, EHL 4/5      Lab Results: I have reviewed the following results:  Recent Labs     12/19/24  0934  "12/19/24  1155 12/20/24  0637   WBC  --   --  7.24   HGB 12.2 12.6 9.6*   HCT 36 37 30.7*   PLT  --   --  259   BUN  --   --  10   CREATININE  --   --  0.72     Blood Culture:    Lab Results   Component Value Date    BLOODCX No Growth After 5 Days. 07/01/2021    BLOODCX No Growth After 5 Days. 07/01/2021     Wound Culture: No results found for: \"WOUNDCULT\"  "

## 2024-12-21 NOTE — PLAN OF CARE
Problem: Prexisting or High Potential for Compromised Skin Integrity  Goal: Skin integrity is maintained or improved  Description: INTERVENTIONS:  - Identify patients at risk for skin breakdown  - Assess and monitor skin integrity  - Assess and monitor nutrition and hydration status  - Monitor labs   - Assess for incontinence   - Turn and reposition patient  - Assist with mobility/ambulation  - Relieve pressure over bony prominences  - Avoid friction and shearing  - Provide appropriate hygiene as needed including keeping skin clean and dry  - Evaluate need for skin moisturizer/barrier cream  - Collaborate with interdisciplinary team   - Patient/family teaching  - Consider wound care consult   Outcome: Progressing     Problem: PAIN - ADULT  Goal: Verbalizes/displays adequate comfort level or baseline comfort level  Description: Interventions:  - Encourage patient to monitor pain and request assistance  - Assess pain using appropriate pain scale  - Administer analgesics based on type and severity of pain and evaluate response  - Implement non-pharmacological measures as appropriate and evaluate response  - Consider cultural and social influences on pain and pain management  - Notify physician/advanced practitioner if interventions unsuccessful or patient reports new pain  Outcome: Progressing     Problem: INFECTION - ADULT  Goal: Absence or prevention of progression during hospitalization  Description: INTERVENTIONS:  - Assess and monitor for signs and symptoms of infection  - Monitor lab/diagnostic results  - Monitor all insertion sites, i.e. indwelling lines, tubes, and drains  - Monitor endotracheal if appropriate and nasal secretions for changes in amount and color  - [place] appropriate cooling/warming therapies per order  - Administer medications as ordered  - Instruct and encourage patient and family to use good hand hygiene technique  - Identify and instruct in appropriate isolation precautions for  identified infection/condition  Outcome: Progressing  Goal: Absence of fever/infection during neutropenic period  Description: INTERVENTIONS:  - Monitor WBC    Outcome: Progressing     Problem: SAFETY ADULT  Goal: Patient will remain free of falls  Description: INTERVENTIONS:  - Educate patient/family on patient safety including physical limitations  - Instruct patient to call for assistance with activity   - Consult OT/PT to assist with strengthening/mobility   - Keep Call bell within reach  - Keep bed low and locked with side rails adjusted as appropriate  - Keep care items and personal belongings within reach  - Initiate and maintain comfort rounds  - Make Fall Risk Sign visible to staff  - Offer Toileting every 2 Hours, in advance of need  - Initiate/Maintain bed/chairalarm  - Obtain necessary fall risk management equipment: walker  - Apply yellow socks and bracelet for high fall risk patients  - Consider moving patient to room near nurses station  Outcome: Progressing  Goal: Maintain or return to baseline ADL function  Description: INTERVENTIONS:  -  Assess patient's ability to carry out ADLs; assess patient's baseline for ADL function and identify physical deficits which impact ability to perform ADLs (bathing, care of mouth/teeth, toileting, grooming, dressing, etc.)  - Assess/evaluate cause of self-care deficits   - Assess range of motion  - Assess patient's mobility; develop plan if impaired  - Assess patient's need for assistive devices and provide as appropriate  - Encourage maximum independence but intervene and supervise when necessary  - Involve family in performance of ADLs  - Assess for home care needs following discharge   - Consider OT consult to assist with ADL evaluation and planning for discharge  - Provide patient education as appropriate  Outcome: Progressing  Goal: Maintains/Returns to pre admission functional level  Description: INTERVENTIONS:  - Perform AM-PAC 6 Click Basic Mobility/ Daily  Activity assessment daily.  - Set and communicate daily mobility goal to care team and patient/family/caregiver.   - Collaborate with rehabilitation services on mobility goals if consulted  - Perform Range of Motion 3 times a day.  - Reposition patient every 2 hours.  - Dangle patient 3 times a day  - Stand patient 3 times a day  - Ambulate patient 3 times a day  - Out of bed to chair 3 times a day   - Out of bed for meals 3 times a day  - Out of bed for toileting  - Record patient progress and toleration of activity level   Outcome: Progressing     Problem: DISCHARGE PLANNING  Goal: Discharge to home or other facility with appropriate resources  Description: INTERVENTIONS:  - Identify barriers to discharge w/patient and caregiver  - Arrange for needed discharge resources and transportation as appropriate  - Identify discharge learning needs (meds, wound care, etc.)  - Arrange for interpretive services to assist at discharge as needed  - Refer to Case Management Department for coordinating discharge planning if the patient needs post-hospital services based on physician/advanced practitioner order or complex needs related to functional status, cognitive ability, or social support system  Outcome: Progressing     Problem: Knowledge Deficit  Goal: Patient/family/caregiver demonstrates understanding of disease process, treatment plan, medications, and discharge instructions  Description: Complete learning assessment and assess knowledge base.  Interventions:  - Provide teaching at level of understanding  - Provide teaching via preferred learning methods  Outcome: Progressing

## 2024-12-21 NOTE — ASSESSMENT & PLAN NOTE
POD 2 from removal hardware L2-S1, posterior fusion L5-S1, revision instrumentation L2-pelvis, bilateral S2 alar screws. Doing well.     No acute events overnight.

## 2024-12-21 NOTE — PLAN OF CARE
Problem: Prexisting or High Potential for Compromised Skin Integrity  Goal: Skin integrity is maintained or improved  Description: INTERVENTIONS:  - Identify patients at risk for skin breakdown  - Assess and monitor skin integrity  - Assess and monitor nutrition and hydration status  - Monitor labs   - Assess for incontinence   - Turn and reposition patient  - Assist with mobility/ambulation  - Relieve pressure over bony prominences  - Avoid friction and shearing  - Provide appropriate hygiene as needed including keeping skin clean and dry  - Evaluate need for skin moisturizer/barrier cream  - Collaborate with interdisciplinary team   - Patient/family teaching  - Consider wound care consult   Outcome: Progressing     Problem: PAIN - ADULT  Goal: Verbalizes/displays adequate comfort level or baseline comfort level  Description: Interventions:  - Encourage patient to monitor pain and request assistance  - Assess pain using appropriate pain scale  - Administer analgesics based on type and severity of pain and evaluate response  - Implement non-pharmacological measures as appropriate and evaluate response  - Consider cultural and social influences on pain and pain management  - Notify physician/advanced practitioner if interventions unsuccessful or patient reports new pain  Outcome: Progressing     Problem: INFECTION - ADULT  Goal: Absence or prevention of progression during hospitalization  Description: INTERVENTIONS:  - Assess and monitor for signs and symptoms of infection  - Monitor lab/diagnostic results  - Monitor all insertion sites, i.e. indwelling lines, tubes, and drains  - Monitor endotracheal if appropriate and nasal secretions for changes in amount and color  - [place] appropriate cooling/warming therapies per order  - Administer medications as ordered  - Instruct and encourage patient and family to use good hand hygiene technique  - Identify and instruct in appropriate isolation precautions for  identified infection/condition  Outcome: Progressing  Goal: Absence of fever/infection during neutropenic period  Description: INTERVENTIONS:  - Monitor WBC    Outcome: Progressing     Problem: SAFETY ADULT  Goal: Patient will remain free of falls  Description: INTERVENTIONS:  - Educate patient/family on patient safety including physical limitations  - Instruct patient to call for assistance with activity   - Consult OT/PT to assist with strengthening/mobility   - Keep Call bell within reach  - Keep bed low and locked with side rails adjusted as appropriate  - Keep care items and personal belongings within reach  - Initiate and maintain comfort rounds  - Make Fall Risk Sign visible to staff  - Offer Toileting every 2 Hours, in advance of need  - Initiate/Maintain bed/chairalarm  - Obtain necessary fall risk management equipment: walker  - Apply yellow socks and bracelet for high fall risk patients  - Consider moving patient to room near nurses station  Outcome: Progressing  Goal: Maintain or return to baseline ADL function  Description: INTERVENTIONS:  -  Assess patient's ability to carry out ADLs; assess patient's baseline for ADL function and identify physical deficits which impact ability to perform ADLs (bathing, care of mouth/teeth, toileting, grooming, dressing, etc.)  - Assess/evaluate cause of self-care deficits   - Assess range of motion  - Assess patient's mobility; develop plan if impaired  - Assess patient's need for assistive devices and provide as appropriate  - Encourage maximum independence but intervene and supervise when necessary  - Involve family in performance of ADLs  - Assess for home care needs following discharge   - Consider OT consult to assist with ADL evaluation and planning for discharge  - Provide patient education as appropriate  Outcome: Progressing  Goal: Maintains/Returns to pre admission functional level  Description: INTERVENTIONS:  - Perform AM-PAC 6 Click Basic Mobility/ Daily  Activity assessment daily.  - Set and communicate daily mobility goal to care team and patient/family/caregiver.   - Collaborate with rehabilitation services on mobility goals if consulted  - Perform Range of Motion 3 times a day.  - Reposition patient every 2 hours.  - Dangle patient 3 times a day  - Stand patient 3 times a day  - Ambulate patient 3 times a day  - Out of bed to chair 3 times a day   - Out of bed for meals 3 times a day  - Out of bed for toileting  - Record patient progress and toleration of activity level   Outcome: Progressing     Problem: DISCHARGE PLANNING  Goal: Discharge to home or other facility with appropriate resources  Description: INTERVENTIONS:  - Identify barriers to discharge w/patient and caregiver  - Arrange for needed discharge resources and transportation as appropriate  - Identify discharge learning needs (meds, wound care, etc.)  - Arrange for interpretive services to assist at discharge as needed  - Refer to Case Management Department for coordinating discharge planning if the patient needs post-hospital services based on physician/advanced practitioner order or complex needs related to functional status, cognitive ability, or social support system  Outcome: Progressing     Problem: Knowledge Deficit  Goal: Patient/family/caregiver demonstrates understanding of disease process, treatment plan, medications, and discharge instructions  Description: Complete learning assessment and assess knowledge base.  Interventions:  - Provide teaching at level of understanding  - Provide teaching via preferred learning methods  Outcome: Progressing

## 2024-12-21 NOTE — PROGRESS NOTES
Progress Note - Hospitalist   Name: Samantha Rodriguez 58 y.o. female I MRN: [id number]  Unit/Bed#: -01 I Date of Admission: 12/19/2024   Date of Service: 12/21/2024 I Hospital Day: 2    Assessment & Plan  Spinal stenosis of lumbar region with neurogenic claudication  Status post removal L2-S1 hardware, with new L5-S1 fusion performed by Dr. Stevens on 12/19 without complication.  Primary management per orthopedic surgery team  WBAT   LSO when oob for comfort  On subcu heparin for DVT prophylaxis 12/20  Analgesia per primary/ Acute Pain service   Transitioned from pca pump to po oxycodone prn   Primary has ordered venous duplex for Sunday 12/22 to eval for DVT post op  Bipolar disorder (HCC)  Continue home Abilify  Psych consulted by primary, f/u input  Patient placed on virtual 1:1 overnight 12/19-->12/20 as she was agitated and disoriented--most likely related to post op and not psych however can f/u psych input, wean as able  Hypokalemia  Potassium 3.4   Replete with 40 meq kdur  Toxic encephalopathy  Possible toxic encephalopathy due to anesthesia; as evidenced by altered mental status  which improved by this morning, now oriented X 3; requiring virtual 1:1 for 24 hours and neuro checks every 4 hours. Now appears to be at her baseline    Essential hypertension  Monitor on home amlodipine and prazosin  Titrate meds as needed  Tardive dyskinesia  Continue home valbenazine  History of CVA (cerebrovascular accident)  No antiplatelet medication listed on home list  Continue home statin  Opioid-induced constipation  Cont home lubipristone  Severe episode of recurrent major depressive disorder, without psychotic features (HCC)  Continue home Abilify, BuSpar, Zoloft  Psych consulted by primary, f/u input  Acute blood loss anemia  A/e/b >2 gram drop from pre-op levels  Monitor CBC  Transfuse PRN  Pain associated with surgical procedure      VTE Pharmacologic Prophylaxis:   High Risk (Score >/= 5) - Pharmacological  DVT Prophylaxis Ordered: heparin. Sequential Compression Devices Ordered.    Mobility:   Basic Mobility Inpatient Raw Score: 14  JH-HLM Goal: 4: Move to chair/commode  JH-HLM Achieved: 4: Move to chair/commode  JH-HLM Goal achieved. Continue to encourage appropriate mobility.    Patient Centered Rounds: I performed bedside rounds with nursing staff today.   Discussions with Specialists or Other Care Team Provider: nursing    Education and Discussions with Family / Patient:  per primary team .     Current Length of Stay: 2 day(s)  Current Patient Status: Inpatient   Certification Statement: The patient will continue to require additional inpatient hospital stay due to pending rehab per primary  Discharge Plan: Anticipate discharge in 24-48 hrs to rehab facility.    Code Status: Level 1 - Full Code    Subjective   Pt is laying in bed needs to get up to go to the bathroom. She has pain in back around surgical site. She states she has not had a bm since arrival and feels like she needs to go . Stood up and assisted to the commode at the bedside    Objective :  Temp:  [97.6 °F (36.4 °C)-100.5 °F (38.1 °C)] 99.9 °F (37.7 °C)  HR:  [85-95] 89  BP: (111-144)/(61-92) 144/92  Resp:  [16-20] 16  SpO2:  [94 %-97 %] 97 %  O2 Device: None (Room air)    Body mass index is 39.87 kg/m².     Input and Output Summary (last 24 hours):     Intake/Output Summary (Last 24 hours) at 12/21/2024 1450  Last data filed at 12/21/2024 1101  Gross per 24 hour   Intake 482 ml   Output 950 ml   Net -468 ml       Physical Exam  Constitutional:       General: She is not in acute distress.     Appearance: She is not ill-appearing, toxic-appearing or diaphoretic.   HENT:      Head: Normocephalic and atraumatic.   Cardiovascular:      Rate and Rhythm: Normal rate.      Heart sounds: No murmur heard.     No friction rub. No gallop.   Pulmonary:      Effort: No respiratory distress.      Breath sounds: No stridor. No wheezing, rhonchi or rales.   Chest:       Chest wall: No tenderness.   Abdominal:      General: There is no distension.      Palpations: Abdomen is soft. There is no mass.      Tenderness: There is no abdominal tenderness. There is no guarding or rebound.      Hernia: No hernia is present.   Musculoskeletal:         General: No swelling, tenderness, deformity or signs of injury.      Right lower leg: No edema.      Left lower leg: No edema.   Skin:     Coloration: Skin is not jaundiced or pale.      Findings: No bruising, erythema, lesion or rash.      Comments: Posterior back with incision post op dressing peeling off discussed nursing she is going to talk with ortho to change   Neurological:      Mental Status: She is alert and oriented to person, place, and time.   Psychiatric:         Behavior: Behavior normal.           Lines/Drains:              Lab Results: I have reviewed the following results:   Results from last 7 days   Lab Units 12/21/24  0634   WBC Thousand/uL 6.45   HEMOGLOBIN g/dL 9.1*   HEMATOCRIT % 29.0*   PLATELETS Thousands/uL 244   SEGS PCT % 71   LYMPHO PCT % 14   MONO PCT % 10   EOS PCT % 4     Results from last 7 days   Lab Units 12/21/24  0634   SODIUM mmol/L 139   POTASSIUM mmol/L 3.4*   CHLORIDE mmol/L 105   CO2 mmol/L 28   BUN mg/dL 9   CREATININE mg/dL 0.67   ANION GAP mmol/L 6   CALCIUM mg/dL 8.2*   GLUCOSE RANDOM mg/dL 103         Results from last 7 days   Lab Units 12/19/24  1412   POC GLUCOSE mg/dl 174*               Recent Cultures (last 7 days):         Imaging Results Review: I reviewed radiology reports from this admission including: xray(s).  Other Study Results Review: No additional pertinent studies reviewed.    Last 24 Hours Medication List:     Current Facility-Administered Medications:     acetaminophen (TYLENOL) tablet 650 mg, Q6H [facility]    aluminum-magnesium hydroxide-simethicone (MAALOX) oral suspension 30 mL, Q6H PRN    amLODIPine (NORVASC) tablet 5 mg, Daily    ARIPiprazole (ABILIFY) tablet 10 mg, Daily     atorvastatin (LIPITOR) tablet 40 mg, After Dinner    busPIRone (BUSPAR) tablet 15 mg, TID    calcium carbonate (TUMS) chewable tablet 1,000 mg, Daily PRN    docusate sodium (COLACE) capsule 100 mg, BID    heparin (porcine) subcutaneous injection 5,000 Units, Q8H [facility]    hydrOXYzine HCL (ATARAX) tablet 25 mg, TID    lactated ringers infusion, Continuous, Last Rate: 50 mL/hr (12/20/24 0729)    lubiprostone (AMITIZA) capsule 8 mcg, BID    methocarbamol (ROBAXIN) tablet 500 mg, Q6H KESHIA    ondansetron (ZOFRAN) injection 4 mg, Q6H PRN    oxyCODONE (ROXICODONE) IR tablet 10 mg, Q4H PRN    oxyCODONE (ROXICODONE) IR tablet 5 mg, Q4H PRN    pantoprazole (PROTONIX) EC tablet 40 mg, Early Morning    prazosin (MINIPRESS) capsule 2 mg, HS    pregabalin (LYRICA) capsule 150 mg, TID    senna (SENOKOT) tablet 8.6 mg, Daily    sertraline (ZOLOFT) tablet 200 mg, HS    traZODone (DESYREL) tablet 300 mg, HS    Valbenazine Tosylate CAPS 1 capsule, Daily    Administrative Statements   Today, Patient Was Seen By: MERCY Arcos      **Please Note: This note may have been constructed using a voice recognition system.**

## 2024-12-21 NOTE — ASSESSMENT & PLAN NOTE
Upon bedside evaluation, Samantha was sleeping in the chair. She is arousable with A/O*3. Dilaudid PCA has been discontinued. I encouraged her to use PO oxycodone PRN for pain control. I did not observe any excessive somnolence.     Continue acetominophen 675 Q6 scheduled  Continue robaxin 500 mg Q6 scheduled  Continue lyrica 150 mg Q8 scheduled  Discontinue hydromorphone PCA 0/.2/10  Continue oxycodone 5/10 Q4 mod/severe pain PRN

## 2024-12-21 NOTE — ASSESSMENT & PLAN NOTE
Acute blood loss anemia in the post-op setting; as evidenced by a drop in hemoglobin from 12.9 on 11/19/2024 to 9.6 post-op, with an EBL of 500 ml; requiring monitoring of hemoglobin levels.

## 2024-12-22 ENCOUNTER — APPOINTMENT (INPATIENT)
Dept: NON INVASIVE DIAGNOSTICS | Facility: HOSPITAL | Age: 59
DRG: 447 | End: 2024-12-22
Payer: COMMERCIAL

## 2024-12-22 PROBLEM — K59.01 SLOW TRANSIT CONSTIPATION: Status: ACTIVE | Noted: 2022-03-15

## 2024-12-22 LAB
ABO GROUP BLD BPU: NORMAL
ABO GROUP BLD BPU: NORMAL
ANION GAP SERPL CALCULATED.3IONS-SCNC: 9 MMOL/L (ref 4–13)
BASOPHILS # BLD AUTO: 0.04 THOUSANDS/ÂΜL (ref 0–0.1)
BASOPHILS NFR BLD AUTO: 1 % (ref 0–1)
BPU ID: NORMAL
BPU ID: NORMAL
BUN SERPL-MCNC: 7 MG/DL (ref 5–25)
CALCIUM SERPL-MCNC: 8.5 MG/DL (ref 8.4–10.2)
CHLORIDE SERPL-SCNC: 102 MMOL/L (ref 96–108)
CO2 SERPL-SCNC: 30 MMOL/L (ref 21–32)
CREAT SERPL-MCNC: 0.57 MG/DL (ref 0.6–1.3)
CROSSMATCH: NORMAL
CROSSMATCH: NORMAL
EOSINOPHIL # BLD AUTO: 0.32 THOUSAND/ÂΜL (ref 0–0.61)
EOSINOPHIL NFR BLD AUTO: 6 % (ref 0–6)
ERYTHROCYTE [DISTWIDTH] IN BLOOD BY AUTOMATED COUNT: 13.5 % (ref 11.6–15.1)
GFR SERPL CREATININE-BSD FRML MDRD: 101 ML/MIN/1.73SQ M
GLUCOSE SERPL-MCNC: 92 MG/DL (ref 65–140)
HCT VFR BLD AUTO: 29.2 % (ref 34.8–46.1)
HGB BLD-MCNC: 9.4 G/DL (ref 11.5–15.4)
IMM GRANULOCYTES # BLD AUTO: 0.02 THOUSAND/UL (ref 0–0.2)
IMM GRANULOCYTES NFR BLD AUTO: 0 % (ref 0–2)
LYMPHOCYTES # BLD AUTO: 1.27 THOUSANDS/ÂΜL (ref 0.6–4.47)
LYMPHOCYTES NFR BLD AUTO: 24 % (ref 14–44)
MCH RBC QN AUTO: 29.2 PG (ref 26.8–34.3)
MCHC RBC AUTO-ENTMCNC: 32.2 G/DL (ref 31.4–37.4)
MCV RBC AUTO: 91 FL (ref 82–98)
MONOCYTES # BLD AUTO: 0.5 THOUSAND/ÂΜL (ref 0.17–1.22)
MONOCYTES NFR BLD AUTO: 9 % (ref 4–12)
NEUTROPHILS # BLD AUTO: 3.17 THOUSANDS/ÂΜL (ref 1.85–7.62)
NEUTS SEG NFR BLD AUTO: 60 % (ref 43–75)
NRBC BLD AUTO-RTO: 0 /100 WBCS
PLATELET # BLD AUTO: 262 THOUSANDS/UL (ref 149–390)
PMV BLD AUTO: 10.6 FL (ref 8.9–12.7)
POTASSIUM SERPL-SCNC: 3.5 MMOL/L (ref 3.5–5.3)
RBC # BLD AUTO: 3.22 MILLION/UL (ref 3.81–5.12)
SODIUM SERPL-SCNC: 141 MMOL/L (ref 135–147)
UNIT DISPENSE STATUS: NORMAL
UNIT DISPENSE STATUS: NORMAL
UNIT PRODUCT CODE: NORMAL
UNIT PRODUCT CODE: NORMAL
UNIT PRODUCT VOLUME: 350 ML
UNIT PRODUCT VOLUME: 350 ML
UNIT RH: NORMAL
UNIT RH: NORMAL
WBC # BLD AUTO: 5.32 THOUSAND/UL (ref 4.31–10.16)

## 2024-12-22 PROCEDURE — 93970 EXTREMITY STUDY: CPT | Performed by: SURGERY

## 2024-12-22 PROCEDURE — NC001 PR NO CHARGE: Performed by: ORTHOPAEDIC SURGERY

## 2024-12-22 PROCEDURE — 80048 BASIC METABOLIC PNL TOTAL CA: CPT

## 2024-12-22 PROCEDURE — 93970 EXTREMITY STUDY: CPT

## 2024-12-22 PROCEDURE — 99232 SBSQ HOSP IP/OBS MODERATE 35: CPT | Performed by: NURSE PRACTITIONER

## 2024-12-22 PROCEDURE — 85025 COMPLETE CBC W/AUTO DIFF WBC: CPT

## 2024-12-22 RX ORDER — LACTULOSE 10 G/15ML
30 SOLUTION ORAL ONCE AS NEEDED
Status: COMPLETED | OUTPATIENT
Start: 2024-12-22 | End: 2024-12-22

## 2024-12-22 RX ADMIN — ACETAMINOPHEN 650 MG: 325 TABLET, FILM COATED ORAL at 12:14

## 2024-12-22 RX ADMIN — SENNOSIDES 8.6 MG: 8.6 TABLET, FILM COATED ORAL at 09:09

## 2024-12-22 RX ADMIN — AMLODIPINE BESYLATE 5 MG: 5 TABLET ORAL at 09:09

## 2024-12-22 RX ADMIN — METHOCARBAMOL 500 MG: 500 TABLET ORAL at 21:37

## 2024-12-22 RX ADMIN — PREGABALIN 150 MG: 75 CAPSULE ORAL at 16:23

## 2024-12-22 RX ADMIN — OXYCODONE HYDROCHLORIDE 10 MG: 5 TABLET ORAL at 08:26

## 2024-12-22 RX ADMIN — METHYLNALTREXONE BROMIDE 8 MG: 8 INJECTION, SOLUTION SUBCUTANEOUS at 13:06

## 2024-12-22 RX ADMIN — ATORVASTATIN CALCIUM 40 MG: 40 TABLET, FILM COATED ORAL at 17:14

## 2024-12-22 RX ADMIN — SERTRALINE 200 MG: 100 TABLET, FILM COATED ORAL at 21:36

## 2024-12-22 RX ADMIN — METHOCARBAMOL 500 MG: 500 TABLET ORAL at 12:14

## 2024-12-22 RX ADMIN — HYDROXYZINE HYDROCHLORIDE 25 MG: 25 TABLET, FILM COATED ORAL at 16:23

## 2024-12-22 RX ADMIN — PANTOPRAZOLE SODIUM 40 MG: 40 TABLET, DELAYED RELEASE ORAL at 06:26

## 2024-12-22 RX ADMIN — METHOCARBAMOL 500 MG: 500 TABLET ORAL at 06:26

## 2024-12-22 RX ADMIN — OXYCODONE HYDROCHLORIDE 10 MG: 5 TABLET ORAL at 17:30

## 2024-12-22 RX ADMIN — LUBIPROSTONE 8 MCG: 8 CAPSULE ORAL at 09:09

## 2024-12-22 RX ADMIN — ARIPIPRAZOLE 10 MG: 10 TABLET ORAL at 09:09

## 2024-12-22 RX ADMIN — HYDROXYZINE HYDROCHLORIDE 25 MG: 25 TABLET, FILM COATED ORAL at 09:09

## 2024-12-22 RX ADMIN — HEPARIN SODIUM 5000 UNITS: 5000 INJECTION, SOLUTION INTRAVENOUS; SUBCUTANEOUS at 06:26

## 2024-12-22 RX ADMIN — ACETAMINOPHEN 650 MG: 325 TABLET, FILM COATED ORAL at 06:26

## 2024-12-22 RX ADMIN — PREGABALIN 150 MG: 75 CAPSULE ORAL at 21:36

## 2024-12-22 RX ADMIN — LACTULOSE 30 G: 20 SOLUTION ORAL at 21:37

## 2024-12-22 RX ADMIN — OXYCODONE HYDROCHLORIDE 10 MG: 5 TABLET ORAL at 13:12

## 2024-12-22 RX ADMIN — PRAZOSIN HYDROCHLORIDE 2 MG: 2 CAPSULE ORAL at 21:38

## 2024-12-22 RX ADMIN — OXYCODONE HYDROCHLORIDE 10 MG: 5 TABLET ORAL at 03:56

## 2024-12-22 RX ADMIN — PREGABALIN 150 MG: 75 CAPSULE ORAL at 09:09

## 2024-12-22 RX ADMIN — BUSPIRONE HYDROCHLORIDE 15 MG: 10 TABLET ORAL at 16:23

## 2024-12-22 RX ADMIN — HEPARIN SODIUM 5000 UNITS: 5000 INJECTION, SOLUTION INTRAVENOUS; SUBCUTANEOUS at 21:37

## 2024-12-22 RX ADMIN — OXYCODONE HYDROCHLORIDE 10 MG: 5 TABLET ORAL at 21:36

## 2024-12-22 RX ADMIN — HYDROXYZINE HYDROCHLORIDE 25 MG: 25 TABLET, FILM COATED ORAL at 21:36

## 2024-12-22 RX ADMIN — LUBIPROSTONE 8 MCG: 8 CAPSULE ORAL at 21:38

## 2024-12-22 RX ADMIN — BUSPIRONE HYDROCHLORIDE 15 MG: 10 TABLET ORAL at 21:36

## 2024-12-22 RX ADMIN — DOCUSATE SODIUM 100 MG: 100 CAPSULE, LIQUID FILLED ORAL at 17:14

## 2024-12-22 RX ADMIN — TRAZODONE HYDROCHLORIDE 300 MG: 100 TABLET ORAL at 21:36

## 2024-12-22 RX ADMIN — DOCUSATE SODIUM 100 MG: 100 CAPSULE, LIQUID FILLED ORAL at 09:09

## 2024-12-22 RX ADMIN — METHOCARBAMOL 500 MG: 500 TABLET ORAL at 17:14

## 2024-12-22 RX ADMIN — ACETAMINOPHEN 650 MG: 325 TABLET, FILM COATED ORAL at 17:14

## 2024-12-22 RX ADMIN — HEPARIN SODIUM 5000 UNITS: 5000 INJECTION, SOLUTION INTRAVENOUS; SUBCUTANEOUS at 13:07

## 2024-12-22 RX ADMIN — ACETAMINOPHEN 650 MG: 325 TABLET, FILM COATED ORAL at 21:37

## 2024-12-22 RX ADMIN — BUSPIRONE HYDROCHLORIDE 15 MG: 10 TABLET ORAL at 09:09

## 2024-12-22 NOTE — PLAN OF CARE
Problem: Prexisting or High Potential for Compromised Skin Integrity  Goal: Skin integrity is maintained or improved  Description: INTERVENTIONS:  - Identify patients at risk for skin breakdown  - Assess and monitor skin integrity  - Assess and monitor nutrition and hydration status  - Monitor labs   - Assess for incontinence   - Turn and reposition patient  - Assist with mobility/ambulation  - Relieve pressure over bony prominences  - Avoid friction and shearing  - Provide appropriate hygiene as needed including keeping skin clean and dry  - Evaluate need for skin moisturizer/barrier cream  - Collaborate with interdisciplinary team   - Patient/family teaching  - Consider wound care consult   Outcome: Progressing     Problem: PAIN - ADULT  Goal: Verbalizes/displays adequate comfort level or baseline comfort level  Description: Interventions:  - Encourage patient to monitor pain and request assistance  - Assess pain using appropriate pain scale  - Administer analgesics based on type and severity of pain and evaluate response  - Implement non-pharmacological measures as appropriate and evaluate response  - Consider cultural and social influences on pain and pain management  - Notify physician/advanced practitioner if interventions unsuccessful or patient reports new pain  Outcome: Progressing     Problem: INFECTION - ADULT  Goal: Absence or prevention of progression during hospitalization  Description: INTERVENTIONS:  - Assess and monitor for signs and symptoms of infection  - Monitor lab/diagnostic results  - Monitor all insertion sites, i.e. indwelling lines, tubes, and drains  - Monitor endotracheal if appropriate and nasal secretions for changes in amount and color  - [place] appropriate cooling/warming therapies per order  - Administer medications as ordered  - Instruct and encourage patient and family to use good hand hygiene technique  - Identify and instruct in appropriate isolation precautions for  identified infection/condition  Outcome: Progressing  Goal: Absence of fever/infection during neutropenic period  Description: INTERVENTIONS:  - Monitor WBC    Outcome: Progressing     Problem: SAFETY ADULT  Goal: Patient will remain free of falls  Description: INTERVENTIONS:  - Educate patient/family on patient safety including physical limitations  - Instruct patient to call for assistance with activity   - Consult OT/PT to assist with strengthening/mobility   - Keep Call bell within reach  - Keep bed low and locked with side rails adjusted as appropriate  - Keep care items and personal belongings within reach  - Initiate and maintain comfort rounds  - Make Fall Risk Sign visible to staff  - Offer Toileting every 2 Hours, in advance of need  - Initiate/Maintain bed/chairalarm  - Obtain necessary fall risk management equipment: walker  - Apply yellow socks and bracelet for high fall risk patients  - Consider moving patient to room near nurses station  Outcome: Progressing  Goal: Maintain or return to baseline ADL function  Description: INTERVENTIONS:  -  Assess patient's ability to carry out ADLs; assess patient's baseline for ADL function and identify physical deficits which impact ability to perform ADLs (bathing, care of mouth/teeth, toileting, grooming, dressing, etc.)  - Assess/evaluate cause of self-care deficits   - Assess range of motion  - Assess patient's mobility; develop plan if impaired  - Assess patient's need for assistive devices and provide as appropriate  - Encourage maximum independence but intervene and supervise when necessary  - Involve family in performance of ADLs  - Assess for home care needs following discharge   - Consider OT consult to assist with ADL evaluation and planning for discharge  - Provide patient education as appropriate  Outcome: Progressing  Goal: Maintains/Returns to pre admission functional level  Description: INTERVENTIONS:  - Perform AM-PAC 6 Click Basic Mobility/ Daily  Activity assessment daily.  - Set and communicate daily mobility goal to care team and patient/family/caregiver.   - Collaborate with rehabilitation services on mobility goals if consulted  - Perform Range of Motion 3 times a day.  - Reposition patient every 2 hours.  - Dangle patient 3 times a day  - Stand patient 3 times a day  - Ambulate patient 3 times a day  - Out of bed to chair 3 times a day   - Out of bed for meals 3 times a day  - Out of bed for toileting  - Record patient progress and toleration of activity level   Outcome: Progressing     Problem: DISCHARGE PLANNING  Goal: Discharge to home or other facility with appropriate resources  Description: INTERVENTIONS:  - Identify barriers to discharge w/patient and caregiver  - Arrange for needed discharge resources and transportation as appropriate  - Identify discharge learning needs (meds, wound care, etc.)  - Arrange for interpretive services to assist at discharge as needed  - Refer to Case Management Department for coordinating discharge planning if the patient needs post-hospital services based on physician/advanced practitioner order or complex needs related to functional status, cognitive ability, or social support system  Outcome: Progressing     Problem: Knowledge Deficit  Goal: Patient/family/caregiver demonstrates understanding of disease process, treatment plan, medications, and discharge instructions  Description: Complete learning assessment and assess knowledge base.  Interventions:  - Provide teaching at level of understanding  - Provide teaching via preferred learning methods  Outcome: Progressing

## 2024-12-22 NOTE — PLAN OF CARE
Problem: Prexisting or High Potential for Compromised Skin Integrity  Goal: Skin integrity is maintained or improved  Description: INTERVENTIONS:  - Identify patients at risk for skin breakdown  - Assess and monitor skin integrity  - Assess and monitor nutrition and hydration status  - Monitor labs   - Assess for incontinence   - Turn and reposition patient  - Assist with mobility/ambulation  - Relieve pressure over bony prominences  - Avoid friction and shearing  - Provide appropriate hygiene as needed including keeping skin clean and dry  - Evaluate need for skin moisturizer/barrier cream  - Collaborate with interdisciplinary team   - Patient/family teaching  - Consider wound care consult   Outcome: Progressing     Problem: PAIN - ADULT  Goal: Verbalizes/displays adequate comfort level or baseline comfort level  Description: Interventions:  - Encourage patient to monitor pain and request assistance  - Assess pain using appropriate pain scale  - Administer analgesics based on type and severity of pain and evaluate response  - Implement non-pharmacological measures as appropriate and evaluate response  - Consider cultural and social influences on pain and pain management  - Notify physician/advanced practitioner if interventions unsuccessful or patient reports new pain  Outcome: Progressing     Problem: INFECTION - ADULT  Goal: Absence or prevention of progression during hospitalization  Description: INTERVENTIONS:  - Assess and monitor for signs and symptoms of infection  - Monitor lab/diagnostic results  - Monitor all insertion sites, i.e. indwelling lines, tubes, and drains  - Monitor endotracheal if appropriate and nasal secretions for changes in amount and color  - [place] appropriate cooling/warming therapies per order  - Administer medications as ordered  - Instruct and encourage patient and family to use good hand hygiene technique  - Identify and instruct in appropriate isolation precautions for  identified infection/condition  Outcome: Progressing  Goal: Absence of fever/infection during neutropenic period  Description: INTERVENTIONS:  - Monitor WBC    Outcome: Progressing     Problem: SAFETY ADULT  Goal: Patient will remain free of falls  Description: INTERVENTIONS:  - Educate patient/family on patient safety including physical limitations  - Instruct patient to call for assistance with activity   - Consult OT/PT to assist with strengthening/mobility   - Keep Call bell within reach  - Keep bed low and locked with side rails adjusted as appropriate  - Keep care items and personal belongings within reach  - Initiate and maintain comfort rounds  - Make Fall Risk Sign visible to staff  - Offer Toileting every 2 Hours, in advance of need  - Initiate/Maintain bed/chairalarm  - Obtain necessary fall risk management equipment: walker  - Apply yellow socks and bracelet for high fall risk patients  - Consider moving patient to room near nurses station  Outcome: Progressing  Goal: Maintain or return to baseline ADL function  Description: INTERVENTIONS:  -  Assess patient's ability to carry out ADLs; assess patient's baseline for ADL function and identify physical deficits which impact ability to perform ADLs (bathing, care of mouth/teeth, toileting, grooming, dressing, etc.)  - Assess/evaluate cause of self-care deficits   - Assess range of motion  - Assess patient's mobility; develop plan if impaired  - Assess patient's need for assistive devices and provide as appropriate  - Encourage maximum independence but intervene and supervise when necessary  - Involve family in performance of ADLs  - Assess for home care needs following discharge   - Consider OT consult to assist with ADL evaluation and planning for discharge  - Provide patient education as appropriate  Outcome: Progressing  Goal: Maintains/Returns to pre admission functional level  Description: INTERVENTIONS:  - Perform AM-PAC 6 Click Basic Mobility/ Daily  Activity assessment daily.  - Set and communicate daily mobility goal to care team and patient/family/caregiver.   - Collaborate with rehabilitation services on mobility goals if consulted  - Perform Range of Motion 3 times a day.  - Reposition patient every 2 hours.  - Dangle patient 3 times a day  - Stand patient 3 times a day  - Ambulate patient 3 times a day  - Out of bed to chair 3 times a day   - Out of bed for meals 3 times a day  - Out of bed for toileting  - Record patient progress and toleration of activity level   Outcome: Progressing     Problem: DISCHARGE PLANNING  Goal: Discharge to home or other facility with appropriate resources  Description: INTERVENTIONS:  - Identify barriers to discharge w/patient and caregiver  - Arrange for needed discharge resources and transportation as appropriate  - Identify discharge learning needs (meds, wound care, etc.)  - Arrange for interpretive services to assist at discharge as needed  - Refer to Case Management Department for coordinating discharge planning if the patient needs post-hospital services based on physician/advanced practitioner order or complex needs related to functional status, cognitive ability, or social support system  Outcome: Progressing     Problem: Knowledge Deficit  Goal: Patient/family/caregiver demonstrates understanding of disease process, treatment plan, medications, and discharge instructions  Description: Complete learning assessment and assess knowledge base.  Interventions:  - Provide teaching at level of understanding  - Provide teaching via preferred learning methods  Outcome: Progressing

## 2024-12-22 NOTE — ASSESSMENT & PLAN NOTE
POD 3 from removal hardware L2-S1, posterior fusion L5-S1, revision instrumentation L2-pelvis, bilateral S2 alar screws. Doing well.     No acute events overnight.

## 2024-12-22 NOTE — PROGRESS NOTES
Progress Note - Orthopedics   Name: Samantha Rodriguez 58 y.o. female I MRN: [id number]  Unit/Bed#: -01 I Date of Admission: 12/19/2024   Date of Service: 12/22/2024 I Hospital Day: 3    Assessment & Plan  Spinal stenosis of lumbar region with neurogenic claudication  POD 3 from removal hardware L2-S1, posterior fusion L5-S1, revision instrumentation L2-pelvis, bilateral S2 alar screws. Doing well.     No acute events overnight.  Acute blood loss anemia  Acute blood loss anemia in the post-op setting; as evidenced by a drop in hemoglobin from 12.9 on 11/19/2024 to 9.6 post-op, with an EBL of 500 ml; requiring monitoring of hemoglobin levels.   Hypokalemia    WBAT  Venous duplex study to be performed 12/22  Lumbar spine precautions  LSO when OOB for comfort  F/u routine psych consult  Will monitor for ABLA and administer IVF/prbc as indicated for Greater than 2 gram drop or Hgb < 7   PT/OT  Incentive spirometry   Pain control  DVT ppx SQH starting 12/20  Diet regular    Subjective   58 y.o.female doing well post op. No acute events, no new complaints. Pain well controlled though still having pain when laying supine. Denies fevers, chills, CP, SOB, N/V, numbness or tingling. Patient has not had BM since surgery but states she has had issues with this preoperatively as well. Pt is more oriented this am, oriented x3. No longer using PCA.    Objective :  Temp:  [97.3 °F (36.3 °C)-99.9 °F (37.7 °C)] 99.2 °F (37.3 °C)  HR:  [84-89] 84  BP: (115-144)/(64-92) 115/64  Resp:  [16-17] 16  SpO2:  [94 %-97 %] 97 %  O2 Device: None (Room air)    Physical Exam  Lumbar exam  Tender to palpation around incision of lumbar spine  Sensation is intact to light touch bilaterally at the L2-S1 dermatomes  Left motor: Hip flexion 4/5, knee extension 4/5, knee flexion 4/5, dorsiflexion 3+/5, plantarflexion 4/5, EHL 4/5  Right motor: Hip flexion 3+/5, knee extension 4/5, knee flexion 4/5, dorsiflexion 3+/5, plantarflexion 4/5, [facility]  "3/5      Lab Results: I have reviewed the following results:  Recent Labs     12/20/24  0637 12/21/24  0634 12/22/24  0441   WBC 7.24 6.45 5.32   HGB 9.6* 9.1* 9.4*   HCT 30.7* 29.0* 29.2*    244 262   BUN 10 9 7   CREATININE 0.72 0.67 0.57*     Blood Culture:    Lab Results   Component Value Date    BLOODCX No Growth After 5 Days. 07/01/2021    BLOODCX No Growth After 5 Days. 07/01/2021     Wound Culture: No results found for: \"WOUNDCULT\"  "

## 2024-12-22 NOTE — ASSESSMENT & PLAN NOTE
Possible toxic encephalopathy due to anesthesia; as evidenced by altered mental status  which improved by this morning, now oriented X 3; requiring virtual 1:1 for 24 hours and neuro checks every 4 hours. Now appears to be at her baseline

## 2024-12-22 NOTE — PROGRESS NOTES
Progress Note - Hospitalist   Name: Samantha Rodriguez 58 y.o. female I MRN: [id number]  Unit/Bed#: -01 I Date of Admission: 12/19/2024   Date of Service: 12/22/2024 I Hospital Day: 3    Assessment & Plan  Spinal stenosis of lumbar region with neurogenic claudication  Status post removal L2-S1 hardware, with new L5-S1 fusion performed by Dr. Stevens on 12/19 without complication.  Primary management per orthopedic surgery team  WBAT   LSO when oob for comfort  On subcu heparin for DVT prophylaxis 12/20  Analgesia per primary/ Acute Pain service   Transitioned from pca pump to po oxycodone prn   Primary has ordered venous duplex for Sunday 12/22 to eval for DVT post op  Bipolar disorder (HCC)  Continue home Abilify  Psych consulted by primary, f/u input  Patient placed on virtual 1:1 overnight 12/19-->12/20 as she was agitated and disoriented--most likely related to post op and not psych however can f/u psych input, wean as able  Hypokalemia  Potassium 3.5  Replete as needed chk labs in am   Toxic encephalopathy  Possible toxic encephalopathy due to anesthesia; as evidenced by altered mental status  which improved by this morning, now oriented X 3; requiring virtual 1:1 for 24 hours and neuro checks every 4 hours. Now appears to be at her baseline    Essential hypertension  Monitor on home amlodipine and prazosin  Titrate meds as needed  Tardive dyskinesia  Continue home valbenazine  History of CVA (cerebrovascular accident)  No antiplatelet medication listed on home list  Continue home statin  Opioid-induced constipation  Cont home lubipristone  Severe episode of recurrent major depressive disorder, without psychotic features (HCC)  Continue home Abilify, BuSpar, Zoloft  Psych consulted by primary, f/u input  Acute blood loss anemia  A/e/b >2 gram drop from pre-op levels  Monitor CBC  Transfuse PRN  Pain associated with surgical procedure    Slow transit constipation  Pt reports no bm since admission   Continue  senna 1 tab daily   Added lactulose prn daily   Added relistor once prn     VTE Pharmacologic Prophylaxis:   High Risk (Score >/= 5) - Pharmacological DVT Prophylaxis Ordered: heparin. Sequential Compression Devices Ordered.    Mobility:   Basic Mobility Inpatient Raw Score: 14  JH-HLM Goal: 4: Move to chair/commode  JH-HLM Achieved: 6: Walk 10 steps or more  JH-HLM Goal achieved. Continue to encourage appropriate mobility.    Patient Centered Rounds: I performed bedside rounds with nursing staff today.   Discussions with Specialists or Other Care Team Provider: nursing    Education and Discussions with Family / Patient:  patient .     Current Length of Stay: 3 day(s)  Current Patient Status: Inpatient   Certification Statement: The patient will continue to require additional inpatient hospital stay due to ongoing need for rehab   Discharge Plan: Anticipate discharge in 24-48 hrs to rehab facility.    Code Status: Level 1 - Full Code    Subjective   Patient is laying on her left side.  Reports severe pain posterior back area close to incision.  She reports having just taken pain medication.  No nausea or vomiting still with no bowel movement.  We discussed various options.  Reports that lactulose does not do much for her.    Objective :  Temp:  [97.3 °F (36.3 °C)-99.9 °F (37.7 °C)] 98.6 °F (37 °C)  HR:  [78-89] 78  BP: (113-144)/(64-92) 113/64  Resp:  [16-17] 16  SpO2:  [94 %-100 %] 100 %  O2 Device: None (Room air)    Body mass index is 39.87 kg/m².     Input and Output Summary (last 24 hours):     Intake/Output Summary (Last 24 hours) at 12/22/2024 0955  Last data filed at 12/22/2024 0601  Gross per 24 hour   Intake 600 ml   Output 550 ml   Net 50 ml       Physical Exam  Constitutional:       General: She is not in acute distress.     Appearance: She is obese. She is not ill-appearing, toxic-appearing or diaphoretic.   HENT:      Head: Normocephalic and atraumatic.      Nose: No congestion.   Eyes:      General:          Right eye: No discharge.         Left eye: No discharge.   Cardiovascular:      Rate and Rhythm: Normal rate.      Heart sounds: No murmur heard.     No friction rub. No gallop.   Pulmonary:      Effort: No respiratory distress.      Breath sounds: No stridor. No wheezing, rhonchi or rales.   Chest:      Chest wall: No tenderness.   Abdominal:      General: There is no distension.      Palpations: There is no mass.      Tenderness: There is no abdominal tenderness. There is no guarding or rebound.      Hernia: No hernia is present.   Musculoskeletal:         General: No swelling, tenderness, deformity or signs of injury.      Right lower leg: No edema.      Left lower leg: No edema.   Skin:     Coloration: Skin is not jaundiced or pale.      Findings: No bruising, erythema, lesion or rash.      Comments: Posterior mid back area dry sterile dressing in place changed day prior with some peeling off    Neurological:      Mental Status: She is alert and oriented to person, place, and time.           Lines/Drains:              Lab Results: I have reviewed the following results:   Results from last 7 days   Lab Units 12/22/24  0441   WBC Thousand/uL 5.32   HEMOGLOBIN g/dL 9.4*   HEMATOCRIT % 29.2*   PLATELETS Thousands/uL 262   SEGS PCT % 60   LYMPHO PCT % 24   MONO PCT % 9   EOS PCT % 6     Results from last 7 days   Lab Units 12/22/24  0441   SODIUM mmol/L 141   POTASSIUM mmol/L 3.5   CHLORIDE mmol/L 102   CO2 mmol/L 30   BUN mg/dL 7   CREATININE mg/dL 0.57*   ANION GAP mmol/L 9   CALCIUM mg/dL 8.5   GLUCOSE RANDOM mg/dL 92         Results from last 7 days   Lab Units 12/19/24  1412   POC GLUCOSE mg/dl 174*               Recent Cultures (last 7 days):         Imaging Results Review: I reviewed radiology reports from this admission including: xray(s).  Other Study Results Review: No additional pertinent studies reviewed.    Last 24 Hours Medication List:     Current Facility-Administered Medications:      acetaminophen (TYLENOL) tablet 650 mg, Q6H KESHIA    aluminum-magnesium hydroxide-simethicone (MAALOX) oral suspension 30 mL, Q6H PRN    amLODIPine (NORVASC) tablet 5 mg, Daily    ARIPiprazole (ABILIFY) tablet 10 mg, Daily    atorvastatin (LIPITOR) tablet 40 mg, After Dinner    busPIRone (BUSPAR) tablet 15 mg, TID    calcium carbonate (TUMS) chewable tablet 1,000 mg, Daily PRN    docusate sodium (COLACE) capsule 100 mg, BID    heparin (porcine) subcutaneous injection 5,000 Units, Q8H KESHIA    hydrOXYzine HCL (ATARAX) tablet 25 mg, TID    lactated ringers infusion, Continuous, Last Rate: 50 mL/hr (12/20/24 0729)    lactulose (CHRONULAC) oral solution 30 g, Once PRN    lubiprostone (AMITIZA) capsule 8 mcg, BID    methocarbamol (ROBAXIN) tablet 500 mg, Q6H KESHIA    methylnaltrexone (Relistor) subcutaneous injection 8 mg, Daily PRN    ondansetron (ZOFRAN) injection 4 mg, Q6H PRN    oxyCODONE (ROXICODONE) IR tablet 10 mg, Q4H PRN    oxyCODONE (ROXICODONE) IR tablet 5 mg, Q4H PRN    pantoprazole (PROTONIX) EC tablet 40 mg, Early Morning    prazosin (MINIPRESS) capsule 2 mg, HS    pregabalin (LYRICA) capsule 150 mg, TID    senna (SENOKOT) tablet 8.6 mg, Daily    sertraline (ZOLOFT) tablet 200 mg, HS    traZODone (DESYREL) tablet 300 mg, HS    Valbenazine Tosylate CAPS 1 capsule, Daily    Administrative Statements   Today, Patient Was Seen By: MERCY Arcos      **Please Note: This note may have been constructed using a voice recognition system.**

## 2024-12-22 NOTE — ASSESSMENT & PLAN NOTE
Pt reports no bm since admission   Continue senna 1 tab daily   Added lactulose prn daily   Added relistor once prn

## 2024-12-23 LAB
ANION GAP SERPL CALCULATED.3IONS-SCNC: 5 MMOL/L (ref 4–13)
BASOPHILS # BLD AUTO: 0.04 THOUSANDS/ÂΜL (ref 0–0.1)
BASOPHILS NFR BLD AUTO: 1 % (ref 0–1)
BUN SERPL-MCNC: 7 MG/DL (ref 5–25)
CALCIUM SERPL-MCNC: 8.4 MG/DL (ref 8.4–10.2)
CHLORIDE SERPL-SCNC: 102 MMOL/L (ref 96–108)
CO2 SERPL-SCNC: 32 MMOL/L (ref 21–32)
CREAT SERPL-MCNC: 0.69 MG/DL (ref 0.6–1.3)
EOSINOPHIL # BLD AUTO: 0.27 THOUSAND/ÂΜL (ref 0–0.61)
EOSINOPHIL NFR BLD AUTO: 5 % (ref 0–6)
ERYTHROCYTE [DISTWIDTH] IN BLOOD BY AUTOMATED COUNT: 13.4 % (ref 11.6–15.1)
GFR SERPL CREATININE-BSD FRML MDRD: 94 ML/MIN/1.73SQ M
GLUCOSE SERPL-MCNC: 118 MG/DL (ref 65–140)
HCT VFR BLD AUTO: 30.4 % (ref 34.8–46.1)
HGB BLD-MCNC: 9.3 G/DL (ref 11.5–15.4)
IMM GRANULOCYTES # BLD AUTO: 0.04 THOUSAND/UL (ref 0–0.2)
IMM GRANULOCYTES NFR BLD AUTO: 1 % (ref 0–2)
LYMPHOCYTES # BLD AUTO: 1.13 THOUSANDS/ÂΜL (ref 0.6–4.47)
LYMPHOCYTES NFR BLD AUTO: 22 % (ref 14–44)
MCH RBC QN AUTO: 28.4 PG (ref 26.8–34.3)
MCHC RBC AUTO-ENTMCNC: 30.6 G/DL (ref 31.4–37.4)
MCV RBC AUTO: 93 FL (ref 82–98)
MONOCYTES # BLD AUTO: 0.4 THOUSAND/ÂΜL (ref 0.17–1.22)
MONOCYTES NFR BLD AUTO: 8 % (ref 4–12)
NEUTROPHILS # BLD AUTO: 3.21 THOUSANDS/ÂΜL (ref 1.85–7.62)
NEUTS SEG NFR BLD AUTO: 63 % (ref 43–75)
NRBC BLD AUTO-RTO: 0 /100 WBCS
PLATELET # BLD AUTO: 268 THOUSANDS/UL (ref 149–390)
PMV BLD AUTO: 9.7 FL (ref 8.9–12.7)
POTASSIUM SERPL-SCNC: 3.1 MMOL/L (ref 3.5–5.3)
RBC # BLD AUTO: 3.27 MILLION/UL (ref 3.81–5.12)
SODIUM SERPL-SCNC: 139 MMOL/L (ref 135–147)
WBC # BLD AUTO: 5.09 THOUSAND/UL (ref 4.31–10.16)

## 2024-12-23 PROCEDURE — 99232 SBSQ HOSP IP/OBS MODERATE 35: CPT | Performed by: PHYSICIAN ASSISTANT

## 2024-12-23 PROCEDURE — 97116 GAIT TRAINING THERAPY: CPT

## 2024-12-23 PROCEDURE — 97535 SELF CARE MNGMENT TRAINING: CPT

## 2024-12-23 PROCEDURE — 99232 SBSQ HOSP IP/OBS MODERATE 35: CPT | Performed by: INTERNAL MEDICINE

## 2024-12-23 PROCEDURE — 97530 THERAPEUTIC ACTIVITIES: CPT

## 2024-12-23 PROCEDURE — 99024 POSTOP FOLLOW-UP VISIT: CPT | Performed by: ORTHOPAEDIC SURGERY

## 2024-12-23 PROCEDURE — 85025 COMPLETE CBC W/AUTO DIFF WBC: CPT

## 2024-12-23 PROCEDURE — 97112 NEUROMUSCULAR REEDUCATION: CPT

## 2024-12-23 PROCEDURE — 80048 BASIC METABOLIC PNL TOTAL CA: CPT

## 2024-12-23 RX ORDER — POTASSIUM CHLORIDE 1500 MG/1
40 TABLET, EXTENDED RELEASE ORAL ONCE
Status: COMPLETED | OUTPATIENT
Start: 2024-12-23 | End: 2024-12-23

## 2024-12-23 RX ADMIN — PREGABALIN 150 MG: 75 CAPSULE ORAL at 21:42

## 2024-12-23 RX ADMIN — HEPARIN SODIUM 5000 UNITS: 5000 INJECTION, SOLUTION INTRAVENOUS; SUBCUTANEOUS at 16:19

## 2024-12-23 RX ADMIN — METHOCARBAMOL 500 MG: 500 TABLET ORAL at 17:30

## 2024-12-23 RX ADMIN — SENNOSIDES 8.6 MG: 8.6 TABLET, FILM COATED ORAL at 08:11

## 2024-12-23 RX ADMIN — PREGABALIN 150 MG: 75 CAPSULE ORAL at 08:11

## 2024-12-23 RX ADMIN — BUSPIRONE HYDROCHLORIDE 15 MG: 10 TABLET ORAL at 08:11

## 2024-12-23 RX ADMIN — HYDROXYZINE HYDROCHLORIDE 25 MG: 25 TABLET, FILM COATED ORAL at 08:11

## 2024-12-23 RX ADMIN — METHOCARBAMOL 500 MG: 500 TABLET ORAL at 12:12

## 2024-12-23 RX ADMIN — ACETAMINOPHEN 650 MG: 325 TABLET, FILM COATED ORAL at 12:12

## 2024-12-23 RX ADMIN — METHOCARBAMOL 500 MG: 500 TABLET ORAL at 04:51

## 2024-12-23 RX ADMIN — OXYCODONE HYDROCHLORIDE 10 MG: 5 TABLET ORAL at 17:43

## 2024-12-23 RX ADMIN — HYDROXYZINE HYDROCHLORIDE 25 MG: 25 TABLET, FILM COATED ORAL at 21:42

## 2024-12-23 RX ADMIN — HEPARIN SODIUM 5000 UNITS: 5000 INJECTION, SOLUTION INTRAVENOUS; SUBCUTANEOUS at 04:51

## 2024-12-23 RX ADMIN — LUBIPROSTONE 8 MCG: 8 CAPSULE ORAL at 08:11

## 2024-12-23 RX ADMIN — DOCUSATE SODIUM 100 MG: 100 CAPSULE, LIQUID FILLED ORAL at 17:30

## 2024-12-23 RX ADMIN — OXYCODONE HYDROCHLORIDE 10 MG: 5 TABLET ORAL at 04:51

## 2024-12-23 RX ADMIN — HEPARIN SODIUM 5000 UNITS: 5000 INJECTION, SOLUTION INTRAVENOUS; SUBCUTANEOUS at 21:43

## 2024-12-23 RX ADMIN — ATORVASTATIN CALCIUM 40 MG: 40 TABLET, FILM COATED ORAL at 17:30

## 2024-12-23 RX ADMIN — HYDROXYZINE HYDROCHLORIDE 25 MG: 25 TABLET, FILM COATED ORAL at 16:19

## 2024-12-23 RX ADMIN — OXYCODONE HYDROCHLORIDE 10 MG: 5 TABLET ORAL at 21:41

## 2024-12-23 RX ADMIN — SERTRALINE 200 MG: 100 TABLET, FILM COATED ORAL at 21:42

## 2024-12-23 RX ADMIN — BUSPIRONE HYDROCHLORIDE 15 MG: 10 TABLET ORAL at 16:19

## 2024-12-23 RX ADMIN — OXYCODONE HYDROCHLORIDE 10 MG: 5 TABLET ORAL at 10:35

## 2024-12-23 RX ADMIN — ARIPIPRAZOLE 10 MG: 10 TABLET ORAL at 08:11

## 2024-12-23 RX ADMIN — PRAZOSIN HYDROCHLORIDE 2 MG: 2 CAPSULE ORAL at 21:43

## 2024-12-23 RX ADMIN — ACETAMINOPHEN 650 MG: 325 TABLET, FILM COATED ORAL at 04:51

## 2024-12-23 RX ADMIN — LUBIPROSTONE 8 MCG: 8 CAPSULE ORAL at 21:43

## 2024-12-23 RX ADMIN — BUSPIRONE HYDROCHLORIDE 15 MG: 10 TABLET ORAL at 21:42

## 2024-12-23 RX ADMIN — PREGABALIN 150 MG: 75 CAPSULE ORAL at 16:19

## 2024-12-23 RX ADMIN — ACETAMINOPHEN 650 MG: 325 TABLET, FILM COATED ORAL at 17:30

## 2024-12-23 RX ADMIN — ONDANSETRON 4 MG: 2 INJECTION INTRAMUSCULAR; INTRAVENOUS at 08:38

## 2024-12-23 RX ADMIN — DOCUSATE SODIUM 100 MG: 100 CAPSULE, LIQUID FILLED ORAL at 08:11

## 2024-12-23 RX ADMIN — TRAZODONE HYDROCHLORIDE 300 MG: 100 TABLET ORAL at 21:42

## 2024-12-23 RX ADMIN — POTASSIUM CHLORIDE 40 MEQ: 1500 TABLET, EXTENDED RELEASE ORAL at 09:57

## 2024-12-23 RX ADMIN — AMLODIPINE BESYLATE 5 MG: 5 TABLET ORAL at 08:11

## 2024-12-23 RX ADMIN — PANTOPRAZOLE SODIUM 40 MG: 40 TABLET, DELAYED RELEASE ORAL at 04:51

## 2024-12-23 NOTE — PROGRESS NOTES
Progress Note - Orthopedics   Name: Samantha Rodriguez 58 y.o. female I MRN: [id number]  Unit/Bed#: -01 I Date of Admission: 12/19/2024   Date of Service: 12/23/2024 I Hospital Day: 4    Assessment & Plan  Spinal stenosis of lumbar region with neurogenic claudication  POD 4 from removal hardware L2-S1, posterior fusion L5-S1, revision instrumentation L2-pelvis, bilateral S2 alar screws. Doing well.     No acute events overnight.  Acute blood loss anemia  Acute blood loss anemia in the post-op setting; as evidenced by a drop in hemoglobin from 12.9 on 11/19/2024 to 9.6 post-op, with an EBL of 500 ml; requiring monitoring of hemoglobin levels.   Hypokalemia    Slow transit constipation    WBAT  Venous duplex negative  Lumbar spine precautions  LSO when OOB for comfort  F/u routine psych consult  Will monitor for ABLA and administer IVF/prbc as indicated for Greater than 2 gram drop or Hgb < 7   PT/OT  Incentive spirometry   Pain control  DVT ppx SQH starting 12/20  Diet regular    Subjective   58 y.o.female doing well post op. No acute events, no new complaints. Pain well controlled. Denies fevers, chills, CP, SOB, N/V, numbness or tingling. Patient reports no issues with urination or bowel movements.     Objective :  Temp:  [98.6 °F (37 °C)-100 °F (37.8 °C)] 99.2 °F (37.3 °C)  HR:  [78-79] 78  BP: (113-131)/(64-74) 127/73  Resp:  [16-18] 16  SpO2:  [97 %-100 %] 100 %  O2 Device: None (Room air)    Physical Exam  Lumbar exam  Tender to palpation around incision of lumbar spine  Sensation is intact to light touch bilaterally at the L2-S1 dermatomes  Left motor: Hip flexion 4/5, knee extension 4/5, knee flexion 4/5, dorsiflexion 3+/5, plantarflexion 4/5, EHL 4/5  Right motor: Hip flexion 3+/5, knee extension 4/5, knee flexion 4/5, dorsiflexion 3+/5, plantarflexion 4/5, EHL 3/5      Lab Results: I have reviewed the following results:  Recent Labs     12/20/24  0637 12/21/24  0634 12/22/24  0441   WBC 7.24 6.45 5.32  "  HGB 9.6* 9.1* 9.4*   HCT 30.7* 29.0* 29.2*    244 262   BUN 10 9 7   CREATININE 0.72 0.67 0.57*     Blood Culture:    Lab Results   Component Value Date    BLOODCX No Growth After 5 Days. 07/01/2021    BLOODCX No Growth After 5 Days. 07/01/2021     Wound Culture: No results found for: \"WOUNDCULT\"  "

## 2024-12-23 NOTE — PHYSICAL THERAPY NOTE
Physical Therapy Progress Note     12/23/24 1000   PT Last Visit   PT Visit Date 12/23/24   Note Type   Note Type Treatment   Pain Assessment   Pain Assessment Tool 0-10   Pain Score 5   Pain Location/Orientation Location: Back   Hospital Pain Intervention(s) Repositioned;Ambulation/increased activity;Emotional support   Restrictions/Precautions   Braces or Orthoses LSO   Other Precautions Pain;Fall Risk;Spinal precautions;Bed Alarm;Chair Alarm  (Alarm active post session.)   Subjective   Subjective The patient states that she is feeling stronger today.   Transfers   Sit to Stand 4  Minimal assistance   Additional items Assist x 1   Stand to Sit 4  Minimal assistance   Additional items Assist x 1;Increased time required   Ambulation/Elevation   Gait pattern Excessively slow;Short stride   Gait Assistance 4  Minimal assist   Additional items Assist x 1;Verbal cues   Assistive Device Rolling walker   Distance 25 feet.   Balance   Static Sitting Fair +   Dynamic Sitting Fair   Static Standing Fair -   Dynamic Standing Poor +   Ambulatory Poor +   Activity Tolerance   Activity Tolerance Patient tolerated treatment well;Patient limited by pain   Nurse Made Aware MARIA Xavier.   Exercises   Knee AROM Long Arc Quad Sitting;5 reps;Bilateral;AROM   Ankle Pumps Sitting;10 reps;Bilateral;AROM   Assessment   Prognosis Good   Problem List Decreased strength;Decreased endurance;Impaired balance;Decreased mobility;Pain;Orthopedic restrictions   Assessment The patient is demonstrating notable improvement in her mobility as she only requires limited assistance. She does continue to present with deficits in strength, balance, and overall activity tolerance, but these are improved from the prior session. She continues to be unable to ambulate household distance, but she was able to ambulate within the room today. Her static stance is improved as well as she was able to do so without physical assistance and she was able to stand for  upwards of 14 minutes during the session. The patient remains limited from her baseline, and she will greatly benefit from continued therapies in order to maximize her functional mobility, safety, and independence.   Goals   Patient Goals To get better.   STG Expiration Date 01/03/25   PT Treatment Day 1   Plan   Treatment/Interventions Functional transfer training;LE strengthening/ROM;Therapeutic exercise;Endurance training;Patient/family training;Bed mobility;Gait training;Elevations   Progress Progressing toward goals   PT Frequency 3-5x/wk   Discharge Recommendation   Rehab Resource Intensity Level, PT II (Moderate Resource Intensity)   Equipment Recommended Walker   Walker Package Recommended Wheeled walker   AM-[facility] Basic Mobility Inpatient   Turning in Flat Bed Without Bedrails 3   Lying on Back to Sitting on Edge of Flat Bed Without Bedrails 3   Moving Bed to Chair 3   Standing Up From Chair Using Arms 3   Walk in Room 3   Climb 3-5 Stairs With Railing 2   Basic Mobility Inpatient Raw Score 17   Basic Mobility Standardized Score 39.67   [facility] Highest Level Of Mobility   -HL Goal 5: Stand one or more mins   -HLM Achieved 7: Walk 25 feet or more         An AM-PAC Basic Mobility raw score less than 16 suggests the patient may benefit from discharge to post-acute rehab services.    Harpreet Almeida, PTA

## 2024-12-23 NOTE — CASE MANAGEMENT
Case Management Discharge Planning Note    Patient name Samantha Rodriguez  Location /-01 MRN [id number]  : 1965 Date 2024       Current Admission Date: 2024  Current Admission Diagnosis:Spinal stenosis of lumbar region with neurogenic claudication   Patient Active Problem List    Diagnosis Date Noted Date Diagnosed    Acute blood loss anemia 2024     Pain associated with surgical procedure 2024     Toxic encephalopathy 2024     Severe episode of recurrent major depressive disorder, without psychotic features ([facility]) 2024     Chronic low back pain, unspecified back pain laterality, unspecified whether sciatica present 2024     Opioid-induced constipation 2024     Weight gain 2024     Numbness 2023     Memory loss 2023     Hemiplegia, post-stroke ([facility]) 2022     Hypokalemia 2022     Slow transit constipation 03/15/2022     CVA (cerebral vascular accident) ([facility]) 2022     Mixed hyperlipidemia 2021     History of CVA (cerebrovascular accident) 2021     Dysphagia 2020     Ambulatory dysfunction 2020     Status post insertion of spinal cord stimulator 2020     Tardive dyskinesia 2020     MIMI (obstructive sleep apnea)      Obesity, morbid ([facility]) 2020     Post laminectomy syndrome 10/07/2019     Bipolar I disorder, most recent episode depressed ([facility]) 2019 09/15/2023    Spinal stenosis of lumbar region with neurogenic claudication 2018     Lumbar radiculopathy 2017     Chronic pain disorder 2017     Lumbar spondylosis 10/31/2016     Generalized anxiety disorder with panic attacks 10/26/2016     Bipolar disorder ([facility]) 2015     PTSD (post-traumatic stress disorder) 2015     Panic disorder without agoraphobia 2015     Tremor 2014     Migraine 2014     GERD without esophagitis 2014     Cognitive disorder 2014      Overactive bladder 09/26/2013     Mood insomnia (HCC) 01/31/2013     Urinary incontinence 09/24/2012     Vitamin D deficiency 09/18/2012     Fibromyalgia 09/14/2012     Essential hypertension 09/14/2012       LOS (days): 4  Geometric Mean LOS (GMLOS) (days): 6.3  Days to GMLOS:2.3     OBJECTIVE:  Risk of Unplanned Readmission Score: 21.43         Current admission status: Inpatient   Preferred Pharmacy:   [place], [place] - [address]  [address] [address]  Phone: [phone] Fax: [phone]    [facility]/pharmacy #[id number] - [place] - [address]  [address]  [place] [address]  Phone: [phone] Fax: [phone]    [facility] [place] - [place] - [address]  [address] [place] [address]  Phone: [phone] Fax: [phone]    [facility] [place], [place] - [address]  [address] [address]  Phone: [phone] Fax: [phone]    [facility] [place] - [address]  [address]  [place] [address]  Phone: [phone] Fax: [phone]    Primary Care Provider: Glo Valente DO    Primary Insurance: [facility]  Secondary Insurance:     DISCHARGE DETAILS:    Discharge planning discussed with:: Patient  Freedom of Choice: Yes  Comments - Freedom of Choice: Discussed FOC  CM contacted family/caregiver?: No- see comments (Declined)  Were Treatment Team discharge recommendations reviewed with patient/caregiver?: Yes  Did patient/caregiver verbalize understanding of patient care needs?: N/A- going to facility  Were patient/caregiver advised of the risks associated with not following Treatment Team discharge recommendations?: Yes        Per chart review/am rounds:  pt refusing HH and STR.  Pt stable for dc. Pt requesting to speak to [facility].      CM met  w/pt at bedside. Discussed dcp. Pt was at [facility] and agreeable to return to [facility] for rehab but she wanted to talk to them first. CM asked what she would like to talk to them about - pt stating it was about a medication. CM offered to call [facility] on pt's behalf to f/u about medications.  Pt agreeable.  Pt said if we need to call someone today to call her son Mick as her dtr was unavailable today.     CM sent referral in [facility] to [facility] for return for STR.  Able to accept pt.  CM sent messages to PT/OT requesting updated notes to submit for auth.  Updated notes completed in Aidin. Tasked DCS to submit for auth.    CM called  [employer] re: optioning PW. Pt was optioned 10/30/2024 by [facility].  Paperwork received via email from  SHARRON.

## 2024-12-23 NOTE — CASE MANAGEMENT
[facility] received request for authorization from Care Manager.  Authorization request submitted for: [facility]  Facility Name:  [facility] NPI: [id number]   Facility MD:  Nadeen Chavez NPI: [id number]  Authorization initiated by contacting insurance:  [employer]  Via: Fax  Clinicals submitted via fax #  [phone]  Pending Reference #: N/A    Care Manager notified: Vikki Galeano    Updates to authorization status will be noted in chart. Please reach out to CM for updates on any clinical information.

## 2024-12-23 NOTE — PLAN OF CARE
Problem: OCCUPATIONAL THERAPY ADULT  Goal: Performs self-care activities at highest level of function for planned discharge setting.  See evaluation for individualized goals.  Description: Treatment Interventions: ADL retraining, Functional transfer training, UE strengthening/ROM, Endurance training, Cognitive reorientation, Patient/family training, Equipment evaluation/education, Compensatory technique education, Continued evaluation, Energy conservation, Activityengagement          See flowsheet documentation for full assessment, interventions and recommendations.   Outcome: Progressing  Note: Limitation: Decreased ADL status, Decreased Safe judgement during ADL, Decreased cognition, Decreased endurance, Decreased self-care trans, Decreased high-level ADLs  Prognosis: Good  Assessment: Pt seen for OT treatment session day 1 on this date focused on ADL retraining, functional transfers and mobility, energy conservation, functional endurance, recall of safety precautions. Pt was greeted in bed and was cooperative throughout session. Following session, pt was left in bed at pt request with alarm on and all needs within reach. Pt continues to be limited by functional status related to ADLs and transfers requiring MOD A for toileting, MIN A for UB dressing, MIN A for functional transfers and mobility, although demonstrates improvements in seated grooming performing after setup.   The patient's raw score on the AM-PAC Daily Activity Inpatient Short Form is 16. A raw score of less than 19 suggests the patient may benefit from discharge to post-acute rehabilitation services. Please refer to the recommendation of the Occupational Therapist for safe discharge planning. Pt would benefit from continued acute OT intervention with plan to continue OT treatment sessions 2-3x per week. Recommend d/c to level II services.     Rehab Resource Intensity Level, OT: II (Moderate Resource Intensity)

## 2024-12-23 NOTE — PROGRESS NOTES
Patient:    MRN:  [id number]    Henrry Request ID:  [id number]    Level of care reserved:  Skilled Nursing Facility    Partner Reserved:  [facility], [place] [address] [phone]    Clinical needs requested:    Geography searched:  10 miles around [address]    Start of Service:    Request sent:  10:07am EST on 12/23/2024 by Vikki Mayorga    Partner reserved:  10:13am EST on 12/23/2024 by Vikki Mayorga    Choice list shared:  10:11am EST on 12/23/2024 by Vikki Mayorga

## 2024-12-23 NOTE — ASSESSMENT & PLAN NOTE
POD 4 from removal hardware L2-S1, posterior fusion L5-S1, revision instrumentation L2-pelvis, bilateral S2 alar screws. Doing well.     No acute events overnight.

## 2024-12-23 NOTE — PLAN OF CARE
Problem: Prexisting or High Potential for Compromised Skin Integrity  Goal: Skin integrity is maintained or improved  Description: INTERVENTIONS:  - Identify patients at risk for skin breakdown  - Assess and monitor skin integrity  - Assess and monitor nutrition and hydration status  - Monitor labs   - Assess for incontinence   - Turn and reposition patient  - Assist with mobility/ambulation  - Relieve pressure over bony prominences  - Avoid friction and shearing  - Provide appropriate hygiene as needed including keeping skin clean and dry  - Evaluate need for skin moisturizer/barrier cream  - Collaborate with interdisciplinary team   - Patient/family teaching  - Consider wound care consult   Outcome: Progressing     Problem: PAIN - ADULT  Goal: Verbalizes/displays adequate comfort level or baseline comfort level  Description: Interventions:  - Encourage patient to monitor pain and request assistance  - Assess pain using appropriate pain scale  - Administer analgesics based on type and severity of pain and evaluate response  - Implement non-pharmacological measures as appropriate and evaluate response  - Consider cultural and social influences on pain and pain management  - Notify physician/advanced practitioner if interventions unsuccessful or patient reports new pain  Outcome: Progressing     Problem: INFECTION - ADULT  Goal: Absence or prevention of progression during hospitalization  Description: INTERVENTIONS:  - Assess and monitor for signs and symptoms of infection  - Monitor lab/diagnostic results  - Monitor all insertion sites, i.e. indwelling lines, tubes, and drains  - Monitor endotracheal if appropriate and nasal secretions for changes in amount and color  - [place] appropriate cooling/warming therapies per order  - Administer medications as ordered  - Instruct and encourage patient and family to use good hand hygiene technique  - Identify and instruct in appropriate isolation precautions for  identified infection/condition  Outcome: Progressing  Goal: Absence of fever/infection during neutropenic period  Description: INTERVENTIONS:  - Monitor WBC    Outcome: Progressing     Problem: SAFETY ADULT  Goal: Patient will remain free of falls  Description: INTERVENTIONS:  - Educate patient/family on patient safety including physical limitations  - Instruct patient to call for assistance with activity   - Consult OT/PT to assist with strengthening/mobility   - Keep Call bell within reach  - Keep bed low and locked with side rails adjusted as appropriate  - Keep care items and personal belongings within reach  - Initiate and maintain comfort rounds  - Make Fall Risk Sign visible to staff  - Offer Toileting every 2 Hours, in advance of need  - Initiate/Maintain bed/chairalarm  - Obtain necessary fall risk management equipment: walker  - Apply yellow socks and bracelet for high fall risk patients  - Consider moving patient to room near nurses station  Outcome: Progressing  Goal: Maintain or return to baseline ADL function  Description: INTERVENTIONS:  -  Assess patient's ability to carry out ADLs; assess patient's baseline for ADL function and identify physical deficits which impact ability to perform ADLs (bathing, care of mouth/teeth, toileting, grooming, dressing, etc.)  - Assess/evaluate cause of self-care deficits   - Assess range of motion  - Assess patient's mobility; develop plan if impaired  - Assess patient's need for assistive devices and provide as appropriate  - Encourage maximum independence but intervene and supervise when necessary  - Involve family in performance of ADLs  - Assess for home care needs following discharge   - Consider OT consult to assist with ADL evaluation and planning for discharge  - Provide patient education as appropriate  Outcome: Progressing  Goal: Maintains/Returns to pre admission functional level  Description: INTERVENTIONS:  - Perform AM-PAC 6 Click Basic Mobility/ Daily  Activity assessment daily.  - Set and communicate daily mobility goal to care team and patient/family/caregiver.   - Collaborate with rehabilitation services on mobility goals if consulted  - Perform Range of Motion 3 times a day.  - Reposition patient every 2 hours.  - Dangle patient 3 times a day  - Stand patient 3 times a day  - Ambulate patient 3 times a day  - Out of bed to chair 3 times a day   - Out of bed for meals 3 times a day  - Out of bed for toileting  - Record patient progress and toleration of activity level   Outcome: Progressing     Problem: DISCHARGE PLANNING  Goal: Discharge to home or other facility with appropriate resources  Description: INTERVENTIONS:  - Identify barriers to discharge w/patient and caregiver  - Arrange for needed discharge resources and transportation as appropriate  - Identify discharge learning needs (meds, wound care, etc.)  - Arrange for interpretive services to assist at discharge as needed  - Refer to Case Management Department for coordinating discharge planning if the patient needs post-hospital services based on physician/advanced practitioner order or complex needs related to functional status, cognitive ability, or social support system  Outcome: Progressing     Problem: Knowledge Deficit  Goal: Patient/family/caregiver demonstrates understanding of disease process, treatment plan, medications, and discharge instructions  Description: Complete learning assessment and assess knowledge base.  Interventions:  - Provide teaching at level of understanding  - Provide teaching via preferred learning methods  Outcome: Progressing

## 2024-12-23 NOTE — PLAN OF CARE
Problem: Prexisting or High Potential for Compromised Skin Integrity  Goal: Skin integrity is maintained or improved  Description: INTERVENTIONS:  - Identify patients at risk for skin breakdown  - Assess and monitor skin integrity  - Assess and monitor nutrition and hydration status  - Monitor labs   - Assess for incontinence   - Turn and reposition patient  - Assist with mobility/ambulation  - Relieve pressure over bony prominences  - Avoid friction and shearing  - Provide appropriate hygiene as needed including keeping skin clean and dry  - Evaluate need for skin moisturizer/barrier cream  - Collaborate with interdisciplinary team   - Patient/family teaching  - Consider wound care consult   Outcome: Progressing     Problem: PAIN - ADULT  Goal: Verbalizes/displays adequate comfort level or baseline comfort level  Description: Interventions:  - Encourage patient to monitor pain and request assistance  - Assess pain using appropriate pain scale  - Administer analgesics based on type and severity of pain and evaluate response  - Implement non-pharmacological measures as appropriate and evaluate response  - Consider cultural and social influences on pain and pain management  - Notify physician/advanced practitioner if interventions unsuccessful or patient reports new pain  Outcome: Progressing     Problem: INFECTION - ADULT  Goal: Absence or prevention of progression during hospitalization  Description: INTERVENTIONS:  - Assess and monitor for signs and symptoms of infection  - Monitor lab/diagnostic results  - Monitor all insertion sites, i.e. indwelling lines, tubes, and drains  - Monitor endotracheal if appropriate and nasal secretions for changes in amount and color  - [place] appropriate cooling/warming therapies per order  - Administer medications as ordered  - Instruct and encourage patient and family to use good hand hygiene technique  - Identify and instruct in appropriate isolation precautions for  identified infection/condition  Outcome: Progressing  Goal: Absence of fever/infection during neutropenic period  Description: INTERVENTIONS:  - Monitor WBC    Outcome: Progressing     Problem: SAFETY ADULT  Goal: Patient will remain free of falls  Description: INTERVENTIONS:  - Educate patient/family on patient safety including physical limitations  - Instruct patient to call for assistance with activity   - Consult OT/PT to assist with strengthening/mobility   - Keep Call bell within reach  - Keep bed low and locked with side rails adjusted as appropriate  - Keep care items and personal belongings within reach  - Initiate and maintain comfort rounds  - Make Fall Risk Sign visible to staff  - Offer Toileting every 2 Hours, in advance of need  - Initiate/Maintain bed/chairalarm  - Obtain necessary fall risk management equipment: walker  - Apply yellow socks and bracelet for high fall risk patients  - Consider moving patient to room near nurses station  Outcome: Progressing  Goal: Maintain or return to baseline ADL function  Description: INTERVENTIONS:  -  Assess patient's ability to carry out ADLs; assess patient's baseline for ADL function and identify physical deficits which impact ability to perform ADLs (bathing, care of mouth/teeth, toileting, grooming, dressing, etc.)  - Assess/evaluate cause of self-care deficits   - Assess range of motion  - Assess patient's mobility; develop plan if impaired  - Assess patient's need for assistive devices and provide as appropriate  - Encourage maximum independence but intervene and supervise when necessary  - Involve family in performance of ADLs  - Assess for home care needs following discharge   - Consider OT consult to assist with ADL evaluation and planning for discharge  - Provide patient education as appropriate  Outcome: Progressing  Goal: Maintains/Returns to pre admission functional level  Description: INTERVENTIONS:  - Perform AM-PAC 6 Click Basic Mobility/ Daily  Activity assessment daily.  - Set and communicate daily mobility goal to care team and patient/family/caregiver.   - Collaborate with rehabilitation services on mobility goals if consulted  - Perform Range of Motion 3 times a day.  - Reposition patient every 2 hours.  - Dangle patient 3 times a day  - Stand patient 3 times a day  - Ambulate patient 3 times a day  - Out of bed to chair 3 times a day   - Out of bed for meals 3 times a day  - Out of bed for toileting  - Record patient progress and toleration of activity level   Outcome: Progressing     Problem: DISCHARGE PLANNING  Goal: Discharge to home or other facility with appropriate resources  Description: INTERVENTIONS:  - Identify barriers to discharge w/patient and caregiver  - Arrange for needed discharge resources and transportation as appropriate  - Identify discharge learning needs (meds, wound care, etc.)  - Arrange for interpretive services to assist at discharge as needed  - Refer to Case Management Department for coordinating discharge planning if the patient needs post-hospital services based on physician/advanced practitioner order or complex needs related to functional status, cognitive ability, or social support system  Outcome: Progressing     Problem: Knowledge Deficit  Goal: Patient/family/caregiver demonstrates understanding of disease process, treatment plan, medications, and discharge instructions  Description: Complete learning assessment and assess knowledge base.  Interventions:  - Provide teaching at level of understanding  - Provide teaching via preferred learning methods  Outcome: Progressing

## 2024-12-23 NOTE — PROGRESS NOTES
Patient was not physically seen or examined, however chart thoroughly reviewed. D/W primary RN. Plan as below. Patient appears stable for discharge from SLIM standpoint when deemed acceptable per primary service. Will see PRN. Please call with questions.  Assessment & Plan  Spinal stenosis of lumbar region with neurogenic claudication  Status post removal L2-S1 hardware, with new L5-S1 fusion performed by Dr. Stevens on 12/19 without complication.  Primary management per orthopedic surgery team  WBAT   LSO when OOB for comfort   On subcu heparin for DVT prophylaxis 12/20  Analgesia per primary/APS  Transitioned from pca pump to po oxycodone prn   Venous duplex 12/22 negative for DVT  Discharge planning per primary   Bipolar disorder (HCC)  Continue home Abilify  Psych consulted by primary, signed off  Hypokalemia  Replete   BMP PRN  Toxic encephalopathy  Possible toxic encephalopathy due to anesthesia; as evidenced by altered mental status  which improved by this morning, now oriented X 3; requiring virtual 1:1 for 24 hours and neuro checks every 4 hours. Now appears to be at her baseline    Essential hypertension  Monitor on home amlodipine and prazosin  Titrate meds as needed  Tardive dyskinesia  Continue home valbenazine  History of CVA (cerebrovascular accident)  No antiplatelet medication listed on home list  Continue home statin  Opioid-induced constipation  Cont home lubipristone  Severe episode of recurrent major depressive disorder, without psychotic features ([facility])  Continue home Abilify, BuSpar, Zoloft  Psych consulted by primary, f/u input  Acute blood loss anemia  A/e/b >2 gram drop from pre-op levels  Monitor CBC  Transfuse PRN  Pain associated with surgical procedure    Slow transit constipation  S/p aggressive bowel regimen had BM 12/23

## 2024-12-23 NOTE — ASSESSMENT & PLAN NOTE
Status post removal L2-S1 hardware, with new L5-S1 fusion performed by Dr. Stevens on 12/19 without complication.  Primary management per orthopedic surgery team  WBAT   LSO when OOB for comfort   On subcu heparin for DVT prophylaxis 12/20  Analgesia per primary/APS  Transitioned from pca pump to po oxycodone prn   Venous duplex 12/22 negative for DVT  Discharge planning per primary

## 2024-12-23 NOTE — RESTORATIVE TECHNICIAN NOTE
Restorative Technician Note      Patient Name: Samantha Rodriguez     [facility] Tech Visit Date: 12/23/24  Note Type: Mobility  Patient Position Upon Consult: Supine  Activity Performed: Ambulated  Assistive Device: Roller walker  Patient Position at End of Consult: Bedside chair; All needs within reach; Bed/Chair alarm activated  Nurse Communication: Nurse aware of consult, application of brace    Jcarlos Roblero, Restorative Technician

## 2024-12-23 NOTE — OCCUPATIONAL THERAPY NOTE
Occupational Therapy Progress Note     Patient Name: Samantha Rodriguez  Today's Date: 12/23/2024  Problem List  Principal Problem:    Spinal stenosis of lumbar region with neurogenic claudication  Active Problems:    Bipolar disorder (HCC)    Essential hypertension    Tardive dyskinesia    History of CVA (cerebrovascular accident)    Slow transit constipation    Hypokalemia    Opioid-induced constipation    Severe episode of recurrent major depressive disorder, without psychotic features (HCC)    Acute blood loss anemia    Pain associated with surgical procedure    Toxic encephalopathy              12/23/24 1054   OT Last Visit   OT Visit Date 12/23/24   Note Type   Note Type Treatment for insurance authorization   Pain Assessment   Pain Assessment Tool 0-10   Pain Score 8   Pain Location/Orientation Location: Back   Hospital Pain Intervention(s) Repositioned;Ambulation/increased activity   Restrictions/Precautions   Braces or Orthoses LSO   Other Precautions Cognitive;Bed Alarm;Fall Risk;Pain;Spinal precautions   Lifestyle   Autonomy Pt ? historian, pt reports (I) with ADLs, IADLs, and functional mobility   Reciprocal Relationships family   ADL   Where Assessed Commode   Grooming Assistance 5  Supervision/Setup   Grooming Deficit Setup;Wash/dry face   Grooming Comments washes face seated EOB   UB Dressing Assistance 4  Minimal Assistance   UB Dressing Deficit Pull around back   UB Dressing Comments Pt dons LSO requiring assist to pull around back.   Toileting Assistance  3  Moderate Assistance   Toileting Deficit Setup;Steadying;Perineal hygiene   Toileting Comments Pt requiring assist for hygiene following void using commode.   Bed Mobility   Supine to Sit 3  Moderate assistance   Additional items Assist x 1;Increased time required;Verbal cues;LE management   Sit to Supine 3  Moderate assistance   Additional items Assist x 1;Increased time required;Verbal cues;LE management   Additional Comments Pt greeted and  left in bed with alarm on and all needs within reach. LSO donned EOB prior to transfers/FM.   Transfers   Sit to Stand 4  Minimal assistance   Additional items Assist x 1;Increased time required;Verbal cues   Stand to Sit 4  Minimal assistance   Additional items Assist x 1;Increased time required;Verbal cues   Toilet transfer 4  Minimal assistance   Additional items Assist x 1;Increased time required;Verbal cues;Commode   Additional Comments with RW   Functional Mobility   Functional Mobility 4  Minimal assistance   Additional Comments Pt performs a couple steps bed<>commode with MIN A x 1 with RW.   Additional items Rolling walker   Cognition   Overall Cognitive Status Impaired   Arousal/Participation Cooperative;Alert   Attention Attends with cues to redirect   Orientation Level Oriented X4   Memory Decreased recall of precautions;Decreased recall of recent events;Decreased short term memory  (recalls 2/3 words after 5 minutes)   Following Commands Follows one step commands with increased time or repetition   Comments Pt cooperative to therapy although with some decreased safety awareness and insight to deficits, requiring vc for safety.   Activity Tolerance   Activity Tolerance Patient limited by fatigue;Patient limited by pain   Medical Staff Made Aware RN cleared, communicated with CM   Assessment   Assessment Pt seen for OT treatment session day 1 on this date focused on ADL retraining, functional transfers and mobility, energy conservation, functional endurance, recall of safety precautions. Pt was greeted in bed and was cooperative throughout session. Following session, pt was left in bed at pt request with alarm on and all needs within reach. Pt continues to be limited by functional status related to ADLs and transfers requiring MOD A for toileting, MIN A for UB dressing, MIN A for functional transfers and mobility, although demonstrates improvements in seated grooming performing after setup.   The  patient's raw score on the AM-PAC Daily Activity Inpatient Short Form is 16. A raw score of less than 19 suggests the patient may benefit from discharge to post-acute rehabilitation services. Please refer to the recommendation of the Occupational Therapist for safe discharge planning. Pt would benefit from continued acute OT intervention with plan to continue OT treatment sessions 2-3x per week. Recommend d/c to level II services.   Plan   Treatment Interventions ADL retraining;Functional transfer training;Endurance training;Continued evaluation;Energy conservation;Cognitive reorientation   Goal Expiration Date 01/03/25   OT Treatment Day 1   OT Frequency 2-3x/wk   Discharge Recommendation   Rehab Resource Intensity Level, OT II (Moderate Resource Intensity)   AM-PAC Daily Activity Inpatient   Lower Body Dressing 2   Bathing 2   Toileting 2   Upper Body Dressing 3   Grooming 3   Eating 4   Daily Activity Raw Score 16   Daily Activity Standardized Score (Calc for Raw Score >=11) 35.96   AM-PAC Applied Cognition Inpatient   Following a Speech/Presentation 3   Understanding Ordinary Conversation 4   Taking Medications 3   Remembering Where Things Are Placed or Put Away 3   Remembering List of 4-5 Errands 2   Taking Care of Complicated Tasks 2   Applied Cognition Raw Score 17   Applied Cognition Standardized Score 36.52   Modified [place] Scale   Modified [place] Scale 4   End of Consult   Education Provided Yes   Patient Position at End of Consult Supine;Bed/Chair alarm activated;All needs within reach   Nurse Communication Nurse aware of consult       KENDRICK Soni, OTR/L

## 2024-12-23 NOTE — ASSESSMENT & PLAN NOTE
Upon bedside evaluation, Samantha was sleeping in the chair. She is arousable with A/O*3. Dilaudid PCA has been discontinued. I encouraged her to use PO oxycodone PRN for pain control. I did not observe any excessive somnolence.     Continue acetaminophen 650 Q6 scheduled  Continue robaxin 500 mg Q6 scheduled  Continue lyrica 150 mg Q8 scheduled  Continue oxycodone 5/10 Q4 mod/severe pain PRN  Continue bowel regimen to avoid opioid-induced constipation while on opioid pain medication.  Narcan as needed to reverse opioid should this become necessary.  Ice to painful area for up to 20 minutes every hour as needed.    At discharge, suggest the following:  Acetaminophen 650 mg p.o. every 6 hours as needed mild pain.  Methocarbamol 500 mg p.o. every 6 hours as needed muscle spasm x 3 days.  Pregabalin 150 mg p.o. every 8 hours scheduled.  Oxycodone 5 mg p.o. every 4 hours as needed moderate or severe pain x 3 days.  Patient is not to take previously prescribed morphine while taking oxycodone.  Patient may resume previously prescribed morphine once no longer taking oxycodone.  Follow-up with morphine prescriber as soon as possible after discharge.

## 2024-12-23 NOTE — PLAN OF CARE
Problem: PHYSICAL THERAPY ADULT  Goal: Performs mobility at highest level of function for planned discharge setting.  See evaluation for individualized goals.  Description: Treatment/Interventions: Functional transfer training, LE strengthening/ROM, Elevations, Therapeutic exercise, Endurance training, Patient/family training, Equipment eval/education, Bed mobility, Gait training, Spoke to nursing, OT  Equipment Recommended: Walker       See flowsheet documentation for full assessment, interventions and recommendations.  Outcome: Progressing  Note: Prognosis: Good  Problem List: Decreased strength, Decreased endurance, Impaired balance, Decreased mobility, Pain, Orthopedic restrictions  Assessment: The patient is demonstrating notable improvement in her mobility as she only requires limited assistance. She does continue to present with deficits in strength, balance, and overall activity tolerance, but these are improved from the prior session. She continues to be unable to ambulate household distance, but she was able to ambulate within the room today. Her static stance is improved as well as she was able to do so without physical assistance and she was able to stand for upwards of 14 minutes during the session. The patient remains limited from her baseline, and she will greatly benefit from continued therapies in order to maximize her functional mobility, safety, and independence.        Rehab Resource Intensity Level, PT: II (Moderate Resource Intensity)    See flowsheet documentation for full assessment.

## 2024-12-23 NOTE — PROGRESS NOTES
Progress Note - Acute Pain   Name: Samantha Rodriguez 58 y.o. female I MRN: [id number]  Unit/Bed#: -01 I Date of Admission: 12/19/2024   Date of Service: 12/23/2024 I Hospital Day: 4    Assessment & Plan  Pain associated with surgical procedure    Upon bedside evaluation, Samantha was sleeping in the chair. She is arousable with A/O*3. Dilaudid PCA has been discontinued. I encouraged her to use PO oxycodone PRN for pain control. I did not observe any excessive somnolence.     Continue acetaminophen 650 Q6 scheduled  Continue robaxin 500 mg Q6 scheduled  Continue lyrica 150 mg Q8 scheduled  Continue oxycodone 5/10 Q4 mod/severe pain PRN  Continue bowel regimen to avoid opioid-induced constipation while on opioid pain medication.  Narcan as needed to reverse opioid should this become necessary.  Ice to painful area for up to 20 minutes every hour as needed.    At discharge, suggest the following:  Acetaminophen 650 mg p.o. every 6 hours as needed mild pain.  Methocarbamol 500 mg p.o. every 6 hours as needed muscle spasm x 3 days.  Pregabalin 150 mg p.o. every 8 hours scheduled.  Oxycodone 5 mg p.o. every 4 hours as needed moderate or severe pain x 3 days.  Patient is not to take previously prescribed morphine while taking oxycodone.  Patient may resume previously prescribed morphine once no longer taking oxycodone.  Follow-up with morphine prescriber as soon as possible after discharge.    APS will sign off at this time. Thank you for the consult. All opioids and other analgesics to be written at discretion of primary team. Please contact Acute Pain Service - via SecureChat from [phone] with additional questions or concerns. See SecureFlavours or UserApp for additional contacts and after hours information.    Subjective   Samantha Rodriguez is a 58 y.o. female status post removal hardware L2-S1, posterior fusion L5-S1, revision instrumentation L2-pelvis, bilateral S2 alar screws on 12/20/2024.     Pain History  Current  pain location(s):  Pain Score: 8  Pain Location/Orientation: Location: Back  Pain Scale: Pain Assessment Tool: 0-10  24 hour history: Patient continues to complain of 9-10 out of 10 pain in her lower back although was sleeping on my arrival.  Appeared in no acute distress.  Has been improving with physical therapy and is generally independent with activities per PT notes.  On further questioning, patient states her pain has improved since immediately postoperatively.  I believe her mild delirium may be contributing to difficulty in discerning mild versus moderate versus severe pain.    Opioid requirement previous 24 hours: Oxycodone 10 mg p.o. x 4.  Meds/Allergies   all current active meds have been reviewed, current meds:   Current Facility-Administered Medications:     acetaminophen (TYLENOL) tablet 650 mg, Q6H KESHIA    aluminum-magnesium hydroxide-simethicone (MAALOX) oral suspension 30 mL, Q6H PRN    amLODIPine (NORVASC) tablet 5 mg, Daily    ARIPiprazole (ABILIFY) tablet 10 mg, Daily    atorvastatin (LIPITOR) tablet 40 mg, After Dinner    busPIRone (BUSPAR) tablet 15 mg, TID    calcium carbonate (TUMS) chewable tablet 1,000 mg, Daily PRN    docusate sodium (COLACE) capsule 100 mg, BID    heparin (porcine) subcutaneous injection 5,000 Units, Q8H KESHIA    hydrOXYzine HCL (ATARAX) tablet 25 mg, TID    lubiprostone (AMITIZA) capsule 8 mcg, BID    methocarbamol (ROBAXIN) tablet 500 mg, Q6H KESHIA    methylnaltrexone (Relistor) subcutaneous injection 8 mg, Once PRN    naloxone (NARCAN) 0.04 mg/mL syringe 0.04 mg, Q1MIN PRN    ondansetron (ZOFRAN) injection 4 mg, Q6H PRN    oxyCODONE (ROXICODONE) IR tablet 10 mg, Q4H PRN    oxyCODONE (ROXICODONE) IR tablet 5 mg, Q4H PRN    pantoprazole (PROTONIX) EC tablet 40 mg, Early Morning    prazosin (MINIPRESS) capsule 2 mg, HS    pregabalin (LYRICA) capsule 150 mg, TID    senna (SENOKOT) tablet 8.6 mg, Daily    sertraline (ZOLOFT) tablet 200 mg, HS    traZODone (DESYREL) tablet 300  mg, HS, and PTA meds:   Prior to Admission Medications   Prescriptions Last Dose Informant Patient Reported? Taking?   ARIPiprazole (ABILIFY) 10 mg tablet 12/18/2024 Morning  No Yes   Sig: Take 1 tablet (10 mg total) by mouth daily   Cholecalciferol (VITAMIN D3) 1,000 units tablet 12/18/2024  No Yes   Sig: Take 1 tablet (1,000 Units total) by mouth daily   Diclofenac Sodium (VOLTAREN) 1 % Past Month Outside Facility (Specify) No Yes   Sig: Apply 2 g topically 4 (four) times a day as needed (pain)   Valbenazine Tosylate (Ingrezza) 60 MG CAPS 12/18/2024 at  8:00 AM  No Yes   Sig: Take 1 capsule by mouth in the morning   acetaminophen (TYLENOL) 325 mg tablet Past Month  No Yes   Sig: Take 2 tablets (650 mg total) by mouth every 4 (four) hours as needed for moderate pain   aluminum-magnesium hydroxide-simethicone (MAALOX) 7655-6699-376 mg/30 mL suspension Past Month  No Yes   Sig: Take 30 mL by mouth in the morning   amLODIPine (NORVASC) 5 mg tablet 12/18/2024 at  8:00 AM  No Yes   Sig: Take 1 tablet (5 mg total) by mouth daily   aspirin (Aspirin Low Dose) 81 mg EC tablet 12/3/2024  No Yes   Sig: Take 1 tablet (81 mg total) by mouth daily   atorvastatin (LIPITOR) 40 mg tablet 12/18/2024  No Yes   Sig: Take 1 tablet (40 mg total) by mouth daily with dinner   bisacodyl (DULCOLAX) 10 mg suppository Past Month  Yes Yes   Sig: Insert 10 mg into the rectum as needed for constipation As needed on day 5 of no bowel movement   busPIRone (BUSPAR) 15 mg tablet 12/18/2024  No Yes   Sig: Take 1 tablet (15 mg total) by mouth 3 (three) times a day   cyclobenzaprine (FLEXERIL) 10 mg tablet Past Month  No Yes   Sig: Take 1 tablet (10 mg total) by mouth 3 (three) times a day as needed for muscle spasms   hydrOXYzine HCL (ATARAX) 25 mg tablet 12/18/2024  No Yes   Sig: Take 1 tablet (25 mg total) by mouth 3 (three) times a day   loratadine (CLARITIN) 10 mg tablet Unknown  No No   Sig: Take 1 tablet (10 mg total) by mouth daily    lubiprostone (AMITIZA) 8 mcg capsule 12/18/2024  No Yes   Sig: Take 1 capsule (8 mcg total) by mouth 2 (two) times a day   magnesium Oxide (MAG-OX) 400 mg TABS Past Month  No Yes   Sig: Take 1 tablet (400 mg total) by mouth daily   morphine (MSIR) 15 mg tablet 12/18/2024 at  9:00 PM  No Yes   Sig: Take 1 tablet (15 mg total) by mouth 2 (two) times a day Max Daily Amount: 30 mg   ondansetron (ZOFRAN) 4 mg tablet Past Month  Yes Yes   pantoprazole (PROTONIX) 40 mg tablet 12/18/2024  No Yes   Sig: Take 1 tablet (40 mg total) by mouth daily in the early morning   prazosin (MINIPRESS) 2 mg capsule 12/18/2024 at  9:00 PM  No Yes   Sig: Take 1 capsule (2 mg total) by mouth daily at bedtime   pregabalin (LYRICA) 150 mg capsule 12/18/2024 at  9:00 PM  No Yes   Sig: Take 1 capsule (150 mg total) by mouth 3 (three) times a day   senna-docusate sodium (SENOKOT S) 8.6-50 mg per tablet Past Month  No Yes   Sig: Take 1 tablet by mouth daily as needed for constipation   sertraline (ZOLOFT) 100 mg tablet 12/18/2024 at  9:00 PM  No Yes   Sig: Take 2 tablets (200 mg total) by mouth daily at bedtime   sodium phosphate (PEDIA-LAX) 3.5-9.5 g 59 mL enema Past Month  Yes Yes   Sig: Insert 1 enema into the rectum once   tirzepatide (Zepbound) 2.5 mg/0.5 mL auto-injector   Yes Yes   Sig: Inject 2.5 mg under the skin Once a week   traZODone (DESYREL) 300 MG tablet 12/18/2024 at  9:00 PM  No Yes   Sig: Take 1 tablet (300 mg total) by mouth daily at bedtime      Facility-Administered Medications: None     No Known Allergies  Objective :  Temp:  [99 °F (37.2 °C)-100 °F (37.8 °C)] 99 °F (37.2 °C)  HR:  [78-91] 85  BP: (119-131)/(73-74) 119/74  Resp:  [16-18] 16  SpO2:  [97 %-100 %] 100 %  O2 Device: None (Room air)    Physical Exam  Vitals and nursing note reviewed.   Constitutional:       General: She is sleeping. She is not in acute distress.     Appearance: She is not ill-appearing, toxic-appearing or diaphoretic.      Comments: Lying in  bed.  Appears comfortable.   Skin:     General: Skin is warm and dry.   Neurological:      Mental Status: She is oriented to person, place, and time and easily aroused.      GCS: GCS eye subscore is 4. GCS verbal subscore is 5. GCS motor subscore is 6.   Psychiatric:         Attention and Perception: Attention normal.         Speech: Speech is delayed.         Behavior: Behavior is slowed. Behavior is cooperative.            Lab Results: I have reviewed the following results:  Estimated Creatinine Clearance: 91.7 mL/min (by C-G formula based on SCr of 0.69 mg/dL).  Lab Results   Component Value Date    WBC 5.09 12/23/2024    WBC 4.50 10/26/2015    HGB 9.3 (L) 12/23/2024    HGB 14.9 10/26/2015    HCT 30.4 (L) 12/23/2024    HCT 41.9 10/26/2015     12/23/2024     10/26/2015         Component Value Date/Time     07/27/2015 1206    K 3.1 (L) 12/23/2024 0758    K 3.2 (L) 10/16/2024 2317     12/23/2024 0758     10/16/2024 2317    CO2 32 12/23/2024 0758    CO2 25 12/19/2024 1155    CO2 27 10/16/2024 2317    BUN 7 12/23/2024 0758    BUN 7 10/16/2024 2317    CREATININE 0.69 12/23/2024 0758    CREATININE 0.65 10/16/2024 2317         Component Value Date/Time    CALCIUM 8.4 12/23/2024 0758    CALCIUM 9.6 10/16/2024 2317    ALKPHOS 79 11/19/2024 0622    ALKPHOS 110 10/16/2024 2317    AST 17 11/19/2024 0622    AST 19 10/16/2024 2317    ALT 17 11/19/2024 0622    ALT 18 10/16/2024 2317    BILITOT 0.51 02/22/2014 0615    TP 6.2 (L) 11/19/2024 0622    TP 7.7 10/16/2024 2317    ALB 3.8 11/19/2024 0622    ALB 4.6 10/16/2024 2317       Imaging Results Review: No pertinent imaging studies reviewed.  Other Study Results Review: No additional pertinent studies reviewed.     Administrative Statements   I have spent a total time of 31 minutes in caring for this patient on the day of the visit/encounter including Risks and benefits of tx options, Instructions for management, Patient and family education,  Importance of tx compliance, Risk factor reductions, Impressions, Counseling / Coordination of care, Documenting in the medical record, Reviewing / ordering tests, medicine, procedures  , Obtaining or reviewing history  , and Communicating with other healthcare professionals .

## 2024-12-23 NOTE — CASE MANAGEMENT
Case Management Discharge Planning Note    Patient name Samantha Rodriguez  Location /-01 MRN [id number]  : 1965 Date 2024       Current Admission Date: 2024  Current Admission Diagnosis:Spinal stenosis of lumbar region with neurogenic claudication   Patient Active Problem List    Diagnosis Date Noted Date Diagnosed    Acute blood loss anemia 2024     Pain associated with surgical procedure 2024     Toxic encephalopathy 2024     Severe episode of recurrent major depressive disorder, without psychotic features ([facility]) 2024     Chronic low back pain, unspecified back pain laterality, unspecified whether sciatica present 2024     Opioid-induced constipation 2024     Weight gain 2024     Numbness 2023     Memory loss 2023     Hemiplegia, post-stroke ([facility]) 2022     Hypokalemia 2022     Slow transit constipation 03/15/2022     CVA (cerebral vascular accident) ([facility]) 2022     Mixed hyperlipidemia 2021     History of CVA (cerebrovascular accident) 2021     Dysphagia 2020     Ambulatory dysfunction 2020     Status post insertion of spinal cord stimulator 2020     Tardive dyskinesia 2020     MIMI (obstructive sleep apnea)      Obesity, morbid ([facility]) 2020     Post laminectomy syndrome 10/07/2019     Bipolar I disorder, most recent episode depressed ([facility]) 2019 09/15/2023    Spinal stenosis of lumbar region with neurogenic claudication 2018     Lumbar radiculopathy 2017     Chronic pain disorder 2017     Lumbar spondylosis 10/31/2016     Generalized anxiety disorder with panic attacks 10/26/2016     Bipolar disorder ([facility]) 2015     PTSD (post-traumatic stress disorder) 2015     Panic disorder without agoraphobia 2015     Tremor 2014     Migraine 2014     GERD without esophagitis 2014     Cognitive disorder 2014      Overactive bladder 09/26/2013     Mood insomnia (HCC) 01/31/2013     Urinary incontinence 09/24/2012     Vitamin D deficiency 09/18/2012     Fibromyalgia 09/14/2012     Essential hypertension 09/14/2012       LOS (days): 4  Geometric Mean LOS (GMLOS) (days): 6.3  Days to GMLOS:2.2     OBJECTIVE:  Risk of Unplanned Readmission Score: 21.43         Current admission status: Inpatient   Preferred Pharmacy:   [place], [place] - [address] [facility] [address] [address]  [address] [facility] [address]  Phone: [phone] Fax: [phone]    [facility]/pharmacy #[id number] - [place] - [address]  [address]  [place] [address]  Phone: [phone] Fax: [phone]    [facility] [place]  [place] - [address]  [address]  [place] [address]  Phone: [phone] Fax: [phone]    [facility] [place], [place] - [address]  [address] [address]  Phone: [phone] Fax: [phone]    [facility]  [place], [place] - [address]  [address]  [place] [address]  Phone: [phone] Fax: [phone]    Primary Care Provider: Glo Valente DO    Primary Insurance: [facility] REP  Secondary Insurance:     DISCHARGE DETAILS:     Upon further review of chart pt was optioned by [facility] 10/30/2024. Had an IP stay at [facility] 11/16/2024 - 12/2/2024.  Pt returned to [facility] from [facility].  Message sent in [facility] to [facility] to confirm if pt is there for STR or LTC. Confirmed /[facility] liaison pt is a resident there for LTC.  Pt can return when medically stable.  CM notified ortho provider of same.

## 2024-12-23 NOTE — CASE MANAGEMENT
Called [facility] ([phone]) to check if auth request was received. Spoke to Alvaro who stated auth request has been received and is pending at this time. Pending ref: [id number].  notified: Vikki Galeano

## 2024-12-24 VITALS
DIASTOLIC BLOOD PRESSURE: 83 MMHG | TEMPERATURE: 99 F | HEART RATE: 80 BPM | BODY MASS INDEX: 39.84 KG/M2 | RESPIRATION RATE: 18 BRPM | OXYGEN SATURATION: 96 % | HEIGHT: 61 IN | SYSTOLIC BLOOD PRESSURE: 128 MMHG | WEIGHT: 211 LBS

## 2024-12-24 LAB
ALBUMIN SERPL BCG-MCNC: 3.3 G/DL (ref 3.5–5)
ALP SERPL-CCNC: 69 U/L (ref 34–104)
ALT SERPL W P-5'-P-CCNC: 13 U/L (ref 7–52)
ANION GAP SERPL CALCULATED.3IONS-SCNC: 4 MMOL/L (ref 4–13)
ANION GAP SERPL CALCULATED.3IONS-SCNC: 4 MMOL/L (ref 4–13)
AST SERPL W P-5'-P-CCNC: 18 U/L (ref 13–39)
BASOPHILS # BLD AUTO: 0.03 THOUSANDS/ÂΜL (ref 0–0.1)
BASOPHILS NFR BLD AUTO: 1 % (ref 0–1)
BILIRUB SERPL-MCNC: 0.75 MG/DL (ref 0.2–1)
BUN SERPL-MCNC: 10 MG/DL (ref 5–25)
BUN SERPL-MCNC: 10 MG/DL (ref 5–25)
CALCIUM ALBUM COR SERPL-MCNC: 8.9 MG/DL (ref 8.3–10.1)
CALCIUM SERPL-MCNC: 8.3 MG/DL (ref 8.4–10.2)
CALCIUM SERPL-MCNC: 8.3 MG/DL (ref 8.4–10.2)
CHLORIDE SERPL-SCNC: 104 MMOL/L (ref 96–108)
CHLORIDE SERPL-SCNC: 104 MMOL/L (ref 96–108)
CO2 SERPL-SCNC: 32 MMOL/L (ref 21–32)
CO2 SERPL-SCNC: 32 MMOL/L (ref 21–32)
CREAT SERPL-MCNC: 0.78 MG/DL (ref 0.6–1.3)
CREAT SERPL-MCNC: 0.78 MG/DL (ref 0.6–1.3)
EOSINOPHIL # BLD AUTO: 0.39 THOUSAND/ÂΜL (ref 0–0.61)
EOSINOPHIL NFR BLD AUTO: 8 % (ref 0–6)
ERYTHROCYTE [DISTWIDTH] IN BLOOD BY AUTOMATED COUNT: 13.8 % (ref 11.6–15.1)
GFR SERPL CREATININE-BSD FRML MDRD: 82 ML/MIN/1.73SQ M
GFR SERPL CREATININE-BSD FRML MDRD: 82 ML/MIN/1.73SQ M
GLUCOSE SERPL-MCNC: 114 MG/DL (ref 65–140)
GLUCOSE SERPL-MCNC: 114 MG/DL (ref 65–140)
HCT VFR BLD AUTO: 28.5 % (ref 34.8–46.1)
HGB BLD-MCNC: 8.9 G/DL (ref 11.5–15.4)
IMM GRANULOCYTES # BLD AUTO: 0.03 THOUSAND/UL (ref 0–0.2)
IMM GRANULOCYTES NFR BLD AUTO: 1 % (ref 0–2)
LYMPHOCYTES # BLD AUTO: 1.42 THOUSANDS/ÂΜL (ref 0.6–4.47)
LYMPHOCYTES NFR BLD AUTO: 30 % (ref 14–44)
MCH RBC QN AUTO: 28.4 PG (ref 26.8–34.3)
MCHC RBC AUTO-ENTMCNC: 31.2 G/DL (ref 31.4–37.4)
MCV RBC AUTO: 91 FL (ref 82–98)
MONOCYTES # BLD AUTO: 0.51 THOUSAND/ÂΜL (ref 0.17–1.22)
MONOCYTES NFR BLD AUTO: 11 % (ref 4–12)
NEUTROPHILS # BLD AUTO: 2.4 THOUSANDS/ÂΜL (ref 1.85–7.62)
NEUTS SEG NFR BLD AUTO: 49 % (ref 43–75)
NRBC BLD AUTO-RTO: 0 /100 WBCS
PLATELET # BLD AUTO: 274 THOUSANDS/UL (ref 149–390)
PMV BLD AUTO: 9.7 FL (ref 8.9–12.7)
POTASSIUM SERPL-SCNC: 3.4 MMOL/L (ref 3.5–5.3)
POTASSIUM SERPL-SCNC: 3.4 MMOL/L (ref 3.5–5.3)
PROT SERPL-MCNC: 5.5 G/DL (ref 6.4–8.4)
RBC # BLD AUTO: 3.13 MILLION/UL (ref 3.81–5.12)
SODIUM SERPL-SCNC: 140 MMOL/L (ref 135–147)
SODIUM SERPL-SCNC: 140 MMOL/L (ref 135–147)
WBC # BLD AUTO: 4.78 THOUSAND/UL (ref 4.31–10.16)

## 2024-12-24 PROCEDURE — 80048 BASIC METABOLIC PNL TOTAL CA: CPT

## 2024-12-24 PROCEDURE — NC001 PR NO CHARGE: Performed by: ORTHOPAEDIC SURGERY

## 2024-12-24 PROCEDURE — 80053 COMPREHEN METABOLIC PANEL: CPT | Performed by: NURSE PRACTITIONER

## 2024-12-24 PROCEDURE — 85025 COMPLETE CBC W/AUTO DIFF WBC: CPT

## 2024-12-24 RX ORDER — POTASSIUM CHLORIDE 1500 MG/1
40 TABLET, EXTENDED RELEASE ORAL ONCE
Status: COMPLETED | OUTPATIENT
Start: 2024-12-24 | End: 2024-12-24

## 2024-12-24 RX ADMIN — POTASSIUM CHLORIDE 40 MEQ: 1500 TABLET, EXTENDED RELEASE ORAL at 09:29

## 2024-12-24 RX ADMIN — LUBIPROSTONE 8 MCG: 8 CAPSULE ORAL at 08:25

## 2024-12-24 RX ADMIN — ACETAMINOPHEN 650 MG: 325 TABLET, FILM COATED ORAL at 00:25

## 2024-12-24 RX ADMIN — ARIPIPRAZOLE 10 MG: 10 TABLET ORAL at 08:25

## 2024-12-24 RX ADMIN — OXYCODONE HYDROCHLORIDE 10 MG: 5 TABLET ORAL at 05:38

## 2024-12-24 RX ADMIN — ACETAMINOPHEN 650 MG: 325 TABLET, FILM COATED ORAL at 05:38

## 2024-12-24 RX ADMIN — PREGABALIN 150 MG: 75 CAPSULE ORAL at 08:21

## 2024-12-24 RX ADMIN — HYDROXYZINE HYDROCHLORIDE 25 MG: 25 TABLET, FILM COATED ORAL at 08:22

## 2024-12-24 RX ADMIN — DOCUSATE SODIUM 100 MG: 100 CAPSULE, LIQUID FILLED ORAL at 08:22

## 2024-12-24 RX ADMIN — METHOCARBAMOL 500 MG: 500 TABLET ORAL at 00:25

## 2024-12-24 RX ADMIN — METHOCARBAMOL 500 MG: 500 TABLET ORAL at 05:38

## 2024-12-24 RX ADMIN — HEPARIN SODIUM 5000 UNITS: 5000 INJECTION, SOLUTION INTRAVENOUS; SUBCUTANEOUS at 05:38

## 2024-12-24 RX ADMIN — OXYCODONE HYDROCHLORIDE 10 MG: 5 TABLET ORAL at 09:40

## 2024-12-24 RX ADMIN — PANTOPRAZOLE SODIUM 40 MG: 40 TABLET, DELAYED RELEASE ORAL at 05:38

## 2024-12-24 RX ADMIN — BUSPIRONE HYDROCHLORIDE 15 MG: 10 TABLET ORAL at 08:22

## 2024-12-24 RX ADMIN — SENNOSIDES 8.6 MG: 8.6 TABLET, FILM COATED ORAL at 08:21

## 2024-12-24 NOTE — CASE MANAGEMENT
[facility] received request for transport authorization from Care Manager.   Date of transport: 12/24  Type of transport: BLS  Transport company:  [employer]   NPI: [id number]  Start Location: [facility]  End Location: [facility] Necessity: cannot tolerate seated position  Authorization initiated by contacting insurance: [facility]  Via: Fax [phone]    Care Manager notified: Vikki Galeano    Please reach out to  for updates on any clinical information.

## 2024-12-24 NOTE — PROGRESS NOTES
Progress Note - Orthopedics   Name: Samantha Rodriguez 58 y.o. female I MRN: [id number]  Unit/Bed#: -01 I Date of Admission: 12/19/2024   Date of Service: 12/24/2024 I Hospital Day: 5    Assessment & Plan  Spinal stenosis of lumbar region with neurogenic claudication  POD 5 from removal hardware L2-S1, posterior fusion L5-S1, revision instrumentation L2-pelvis, bilateral S2 alar screws. Doing well.     No acute events overnight.  Acute blood loss anemia  Acute blood loss anemia in the post-op setting; as evidenced by a drop in hemoglobin from 12.9 on 11/19/2024 to 9.6 post-op, with an EBL of 500 ml; requiring monitoring of hemoglobin levels.   Hypokalemia    Slow transit constipation      WBAT  Lumbar spine precautions  LSO when OOB for comfort  Will monitor for ABLA and administer IVF/prbc as indicated for Greater than 2 gram drop or Hgb < 7   PT/OT  Incentive spirometry   Pain control  DVT ppx SQH starting 12/20  Diet regular   dispo planning    Subjective   58 y.o.female doing well post op. No acute events, no new complaints. Pain well controlled. Denies fevers, chills, CP, SOB, N/V, numbness or tingling. Patient reports no issues with urination or bowel movements.    Objective :  Temp:  [98.8 °F (37.1 °C)-99.6 °F (37.6 °C)] 99.6 °F (37.6 °C)  HR:  [71-91] 71  BP: (117-119)/(67-74) 117/70  Resp:  [15-16] 15  SpO2:  [96 %-100 %] 99 %  O2 Device: None (Room air)    Physical Exam  Lumbar exam  Tender to palpation around incision of lumbar spine  Sensation is intact to light touch bilaterally at the L2-S1 dermatomes  Left motor: Hip flexion 4/5, knee extension 4/5, knee flexion 4/5, dorsiflexion 3+/5, plantarflexion 4/5, EHL 4/5  Right motor: Hip flexion 3+/5, knee extension 4/5, knee flexion 4/5, dorsiflexion 3+/5, plantarflexion 4/5, EHL 3/5      Lab Results: I have reviewed the following results:  Recent Labs     12/22/24  0441 12/23/24  0758 12/24/24  0448   WBC 5.32 5.09 4.78   HGB 9.4* 9.3* 8.9*   HCT 29.2*  "30.4* 28.5*    268 274   BUN 7 7 10  10   CREATININE 0.57* 0.69 0.78  0.78     Blood Culture:    Lab Results   Component Value Date    BLOODCX No Growth After 5 Days. 07/01/2021    BLOODCX No Growth After 5 Days. 07/01/2021     Wound Culture: No results found for: \"WOUNDCULT\"  "

## 2024-12-24 NOTE — ASSESSMENT & PLAN NOTE
POD 5 from removal hardware L2-S1, posterior fusion L5-S1, revision instrumentation L2-pelvis, bilateral S2 alar screws. Doing well.     No acute events overnight.

## 2024-12-24 NOTE — DISCHARGE SUMMARY
Discharge Summary - Orthopedics   Name: Samantha Rodriguez 58 y.o. female I MRN: [id number]  Unit/Bed#: -01 I Date of Admission: 12/19/2024   Date of Service: 12/24/2024 I Hospital Day: 5    Admission Date: 12/19/2024 0550  Discharge Date: 12/24/24  Admitting Diagnosis: Scoliosis, unspecified scoliosis type, unspecified spinal region [M41.9]  Lumbar spine pain [M54.50]  History of lumbar surgery [Z98.890]  Ambulatory dysfunction [R26.2]  Pseudoarthrosis of lumbar spine [S32.009K]  Lumbar radiculopathy [M54.16]  Discharge Diagnosis: Status post removal hardware L2-S1, posterior fusion L5-S1, navigated revision instrumentation L2 to pelvis; application of bilateral S2 alar screws and bone grafting of the disc at L5-S1   Medical Problems       Resolved Problems  Date Reviewed: 12/23/2024   None         HPI: 58 y.o. female with a history of scoliosis, lumbar spine pain, previous lumbar surgery, ambulatory dysfunction, pseudoarthrosis of the lumbar spine, and lumbar radiculopathy who has been seen by Dr. Stevens in clinic.  Pt has failed previous non operative therapies and was scheduled for removal hardware L2-S1, posterior fusion L5-S1, navigated revision instrumentation L2 to pelvis; application of bilateral S2 alar screws and bone grafting of the disc at L5-S1. Prior to surgery the risks and benefits of surgery were explained and informed consent was obtained.    Procedures Performed:   Exploration of L2-S1 Fusion mass: 30102  Removal of hardware (pedicle screws): 50581  Arthrodesis, L5-S1 PL: 22820  Arthrodesis, L4-5 PL: 80390  Arthrodesis, L3-4 PL: 99315  Arthrodesis, L2-3 PL: 87966  Posterior segmental instrumentation/pedicle screw fixation, L2-S2: 19346  Pelvic Fixation, S2AI: 29618  3D computer-assisted navigation: 20399      Summary of Hospital Course: Pt was taken to the OR on 12/19/24.  Surgery went without complications and pt was discharged to the PACU in a stable condition and was transferred to the  floor.  On discharge date pt was cleared by PT and the medicine team and determined to be safe for discharge.  Daily discussion was had with the patient, nursing staff, orthopaedic team, and family members if present.  All questions were answered to the patients satisifaction.     Greater than 2 gram decrease in Hb qualifies for diagnosis of acute blood loss anemia. Vital signs remained stable and pt was resuscitated with IVF as needed.     For further details, please refer to the patients medical record and daily progress notes.    Significant Findings, Care, Treatment and Services Provided: None    Complications: None    Condition at Discharge: good       Discharge instructions/Information to patient and family:   See After Visit Summary (AVS) for information provided to patient and family.      Provisions for Follow-Up Care:  See after visit summary for information related to follow-up care and any pertinent home health orders.      PCP: Glo Valente DO    Disposition: Extended care facility [place]    Planned Readmission: No     Discharge Medications:  See after visit summary for reconciled discharge medications provided to patient and family.      Discharge Statement:  I have spent a total time of 30 minutes in caring for this patient on the day of the visit/encounter. >30 minutes of time was spent on: Patient and family education, Counseling / Coordination of care, Documenting in the medical record, Reviewing / ordering tests, medicine, procedures  , and Communicating with other healthcare professionals .

## 2024-12-24 NOTE — PLAN OF CARE
Problem: Prexisting or High Potential for Compromised Skin Integrity  Goal: Skin integrity is maintained or improved  Description: INTERVENTIONS:  - Identify patients at risk for skin breakdown  - Assess and monitor skin integrity  - Assess and monitor nutrition and hydration status  - Monitor labs   - Assess for incontinence   - Turn and reposition patient  - Assist with mobility/ambulation  - Relieve pressure over bony prominences  - Avoid friction and shearing  - Provide appropriate hygiene as needed including keeping skin clean and dry  - Evaluate need for skin moisturizer/barrier cream  - Collaborate with interdisciplinary team   - Patient/family teaching  - Consider wound care consult   Outcome: Adequate for Discharge     Problem: PAIN - ADULT  Goal: Verbalizes/displays adequate comfort level or baseline comfort level  Description: Interventions:  - Encourage patient to monitor pain and request assistance  - Assess pain using appropriate pain scale  - Administer analgesics based on type and severity of pain and evaluate response  - Implement non-pharmacological measures as appropriate and evaluate response  - Consider cultural and social influences on pain and pain management  - Notify physician/advanced practitioner if interventions unsuccessful or patient reports new pain  Outcome: Adequate for Discharge     Problem: INFECTION - ADULT  Goal: Absence or prevention of progression during hospitalization  Description: INTERVENTIONS:  - Assess and monitor for signs and symptoms of infection  - Monitor lab/diagnostic results  - Monitor all insertion sites, i.e. indwelling lines, tubes, and drains  - Monitor endotracheal if appropriate and nasal secretions for changes in amount and color  - Verdi appropriate cooling/warming therapies per order  - Administer medications as ordered  - Instruct and encourage patient and family to use good hand hygiene technique  - Identify and instruct in appropriate isolation  precautions for identified infection/condition  Outcome: Adequate for Discharge  Goal: Absence of fever/infection during neutropenic period  Description: INTERVENTIONS:  - Monitor WBC    Outcome: Adequate for Discharge     Problem: SAFETY ADULT  Goal: Patient will remain free of falls  Description: INTERVENTIONS:  - Educate patient/family on patient safety including physical limitations  - Instruct patient to call for assistance with activity   - Consult OT/PT to assist with strengthening/mobility   - Keep Call bell within reach  - Keep bed low and locked with side rails adjusted as appropriate  - Keep care items and personal belongings within reach  - Initiate and maintain comfort rounds  - Make Fall Risk Sign visible to staff  - Offer Toileting every 2 Hours, in advance of need  - Initiate/Maintain bed/chairalarm  - Obtain necessary fall risk management equipment: walker  - Apply yellow socks and bracelet for high fall risk patients  - Consider moving patient to room near nurses station  Outcome: Adequate for Discharge  Goal: Maintain or return to baseline ADL function  Description: INTERVENTIONS:  -  Assess patient's ability to carry out ADLs; assess patient's baseline for ADL function and identify physical deficits which impact ability to perform ADLs (bathing, care of mouth/teeth, toileting, grooming, dressing, etc.)  - Assess/evaluate cause of self-care deficits   - Assess range of motion  - Assess patient's mobility; develop plan if impaired  - Assess patient's need for assistive devices and provide as appropriate  - Encourage maximum independence but intervene and supervise when necessary  - Involve family in performance of ADLs  - Assess for home care needs following discharge   - Consider OT consult to assist with ADL evaluation and planning for discharge  - Provide patient education as appropriate  Outcome: Adequate for Discharge  Goal: Maintains/Returns to pre admission functional level  Description:  INTERVENTIONS:  - Perform AM-PAC 6 Click Basic Mobility/ Daily Activity assessment daily.  - Set and communicate daily mobility goal to care team and patient/family/caregiver.   - Collaborate with rehabilitation services on mobility goals if consulted  - Perform Range of Motion 3 times a day.  - Reposition patient every 2 hours.  - Dangle patient 3 times a day  - Stand patient 3 times a day  - Ambulate patient 3 times a day  - Out of bed to chair 3 times a day   - Out of bed for meals 3 times a day  - Out of bed for toileting  - Record patient progress and toleration of activity level   Outcome: Adequate for Discharge     Problem: DISCHARGE PLANNING  Goal: Discharge to home or other facility with appropriate resources  Description: INTERVENTIONS:  - Identify barriers to discharge w/patient and caregiver  - Arrange for needed discharge resources and transportation as appropriate  - Identify discharge learning needs (meds, wound care, etc.)  - Arrange for interpretive services to assist at discharge as needed  - Refer to Case Management Department for coordinating discharge planning if the patient needs post-hospital services based on physician/advanced practitioner order or complex needs related to functional status, cognitive ability, or social support system  Outcome: Adequate for Discharge     Problem: Knowledge Deficit  Goal: Patient/family/caregiver demonstrates understanding of disease process, treatment plan, medications, and discharge instructions  Description: Complete learning assessment and assess knowledge base.  Interventions:  - Provide teaching at level of understanding  - Provide teaching via preferred learning methods  Outcome: Adequate for Discharge

## 2024-12-24 NOTE — PLAN OF CARE
Problem: Prexisting or High Potential for Compromised Skin Integrity  Goal: Skin integrity is maintained or improved  Description: INTERVENTIONS:  - Identify patients at risk for skin breakdown  - Assess and monitor skin integrity  - Assess and monitor nutrition and hydration status  - Monitor labs   - Assess for incontinence   - Turn and reposition patient  - Assist with mobility/ambulation  - Relieve pressure over bony prominences  - Avoid friction and shearing  - Provide appropriate hygiene as needed including keeping skin clean and dry  - Evaluate need for skin moisturizer/barrier cream  - Collaborate with interdisciplinary team   - Patient/family teaching  - Consider wound care consult   12/24/2024 0219 by Layna Schwab, RN  Outcome: Progressing  12/24/2024 0219 by Layna Schwab, RN  Outcome: Progressing     Problem: PAIN - ADULT  Goal: Verbalizes/displays adequate comfort level or baseline comfort level  Description: Interventions:  - Encourage patient to monitor pain and request assistance  - Assess pain using appropriate pain scale  - Administer analgesics based on type and severity of pain and evaluate response  - Implement non-pharmacological measures as appropriate and evaluate response  - Consider cultural and social influences on pain and pain management  - Notify physician/advanced practitioner if interventions unsuccessful or patient reports new pain  12/24/2024 0219 by Layna Schwab, RN  Outcome: Progressing  12/24/2024 0219 by Layna Schwab, RN  Outcome: Progressing     Problem: INFECTION - ADULT  Goal: Absence or prevention of progression during hospitalization  Description: INTERVENTIONS:  - Assess and monitor for signs and symptoms of infection  - Monitor lab/diagnostic results  - Monitor all insertion sites, i.e. indwelling lines, tubes, and drains  - Monitor endotracheal if appropriate and nasal secretions for changes in amount and color  - Felt appropriate cooling/warming therapies per  order  - Administer medications as ordered  - Instruct and encourage patient and family to use good hand hygiene technique  - Identify and instruct in appropriate isolation precautions for identified infection/condition  12/24/2024 0219 by Layna Schwab, RN  Outcome: Progressing  12/24/2024 0219 by Layna Schwab, RN  Outcome: Progressing  Goal: Absence of fever/infection during neutropenic period  Description: INTERVENTIONS:  - Monitor WBC    12/24/2024 0219 by Layna Schwab, RN  Outcome: Progressing  12/24/2024 0219 by Layna Schwab, RN  Outcome: Progressing     Problem: SAFETY ADULT  Goal: Patient will remain free of falls  Description: INTERVENTIONS:  - Educate patient/family on patient safety including physical limitations  - Instruct patient to call for assistance with activity   - Consult OT/PT to assist with strengthening/mobility   - Keep Call bell within reach  - Keep bed low and locked with side rails adjusted as appropriate  - Keep care items and personal belongings within reach  - Initiate and maintain comfort rounds  - Make Fall Risk Sign visible to staff  - Offer Toileting every 2 Hours, in advance of need  - Initiate/Maintain bed/chairalarm  - Obtain necessary fall risk management equipment: walker  - Apply yellow socks and bracelet for high fall risk patients  - Consider moving patient to room near nurses station  12/24/2024 0219 by Layna Schwab, RN  Outcome: Progressing  12/24/2024 0219 by Layna Schwab, RN  Outcome: Progressing  Goal: Maintain or return to baseline ADL function  Description: INTERVENTIONS:  -  Assess patient's ability to carry out ADLs; assess patient's baseline for ADL function and identify physical deficits which impact ability to perform ADLs (bathing, care of mouth/teeth, toileting, grooming, dressing, etc.)  - Assess/evaluate cause of self-care deficits   - Assess range of motion  - Assess patient's mobility; develop plan if impaired  - Assess patient's need for assistive devices  and provide as appropriate  - Encourage maximum independence but intervene and supervise when necessary  - Involve family in performance of ADLs  - Assess for home care needs following discharge   - Consider OT consult to assist with ADL evaluation and planning for discharge  - Provide patient education as appropriate  12/24/2024 0219 by Layna Schwab, RN  Outcome: Progressing  12/24/2024 0219 by Layna Schwab, RN  Outcome: Progressing  Goal: Maintains/Returns to pre admission functional level  Description: INTERVENTIONS:  - Perform AM-PAC 6 Click Basic Mobility/ Daily Activity assessment daily.  - Set and communicate daily mobility goal to care team and patient/family/caregiver.   - Collaborate with rehabilitation services on mobility goals if consulted  - Perform Range of Motion 3 times a day.  - Reposition patient every 2 hours.  - Dangle patient 3 times a day  - Stand patient 3 times a day  - Ambulate patient 3 times a day  - Out of bed to chair 3 times a day   - Out of bed for meals 3 times a day  - Out of bed for toileting  - Record patient progress and toleration of activity level   12/24/2024 0219 by Layna Schwab, RN  Outcome: Progressing  12/24/2024 0219 by Layna Schwab, RN  Outcome: Progressing     Problem: DISCHARGE PLANNING  Goal: Discharge to home or other facility with appropriate resources  Description: INTERVENTIONS:  - Identify barriers to discharge w/patient and caregiver  - Arrange for needed discharge resources and transportation as appropriate  - Identify discharge learning needs (meds, wound care, etc.)  - Arrange for interpretive services to assist at discharge as needed  - Refer to Case Management Department for coordinating discharge planning if the patient needs post-hospital services based on physician/advanced practitioner order or complex needs related to functional status, cognitive ability, or social support system  12/24/2024 0219 by Layna Schwab, RN  Outcome: Progressing  12/24/2024  0219 by Layna Schwab, RN  Outcome: Progressing     Problem: Knowledge Deficit  Goal: Patient/family/caregiver demonstrates understanding of disease process, treatment plan, medications, and discharge instructions  Description: Complete learning assessment and assess knowledge base.  Interventions:  - Provide teaching at level of understanding  - Provide teaching via preferred learning methods  12/24/2024 0219 by Layna Schwab, RN  Outcome: Progressing  12/24/2024 0219 by Layna Schwab, RN  Outcome: Progressing

## 2024-12-24 NOTE — CASE MANAGEMENT
[facility] has received APPROVED authorization.  Insurance:   [employer]   Auth obtained via Insurance Rep:  Traci YANG#: [phone]  Authorization received for: SNF  Facility: [facility]   Authorization #: [id number]  Start of Care: 12/24  Next Review Date: 12/31 (Members plan terms 12/31 then pt will have new insurance)  Continued Stay Care Coordinator:  none given  Submit next review to: [phone]      Care Manager notified: Vikki Galeano    Please reach out to CM for updates on any clinical information.

## 2024-12-24 NOTE — CASE MANAGEMENT
Case Management Discharge Planning Note    Patient name Samantha Rodriguez  Location /-01 MRN [id number]  : 1965 Date 2024       Current Admission Date: 2024  Current Admission Diagnosis:Spinal stenosis of lumbar region with neurogenic claudication   Patient Active Problem List    Diagnosis Date Noted Date Diagnosed    Acute blood loss anemia 2024     Pain associated with surgical procedure 2024     Toxic encephalopathy 2024     Severe episode of recurrent major depressive disorder, without psychotic features ([facility]) 2024     Chronic low back pain, unspecified back pain laterality, unspecified whether sciatica present 2024     Opioid-induced constipation 2024     Weight gain 2024     Numbness 2023     Memory loss 2023     Hemiplegia, post-stroke ([facility]) 2022     Hypokalemia 2022     Slow transit constipation 03/15/2022     CVA (cerebral vascular accident) ([facility]) 2022     Mixed hyperlipidemia 2021     History of CVA (cerebrovascular accident) 2021     Dysphagia 2020     Ambulatory dysfunction 2020     Status post insertion of spinal cord stimulator 2020     Tardive dyskinesia 2020     MIMI (obstructive sleep apnea)      Obesity, morbid ([facility]) 2020     Post laminectomy syndrome 10/07/2019     Bipolar I disorder, most recent episode depressed ([facility]) 2019 09/15/2023    Spinal stenosis of lumbar region with neurogenic claudication 2018     Lumbar radiculopathy 2017     Chronic pain disorder 2017     Lumbar spondylosis 10/31/2016     Generalized anxiety disorder with panic attacks 10/26/2016     Bipolar disorder ([facility]) 2015     PTSD (post-traumatic stress disorder) 2015     Panic disorder without agoraphobia 2015     Tremor 2014     Migraine 2014     GERD without esophagitis 2014     Cognitive disorder 2014      Overactive bladder 09/26/2013     Mood insomnia (HCC) 01/31/2013     Urinary incontinence 09/24/2012     Vitamin D deficiency 09/18/2012     Fibromyalgia 09/14/2012     Essential hypertension 09/14/2012       LOS (days): 5  Geometric Mean LOS (GMLOS) (days): 6.3  Days to GMLOS:1.4     OBJECTIVE:  Risk of Unplanned Readmission Score: 24.39         Current admission status: Inpatient   Preferred Pharmacy:   [place], [place] - [address] [facility]  [address] [facility] [address]  Phone: [phone] Fax: [phone]    [facility]/pharmacy #[id number] - [place] - [address]  [address]  [place] [address]  Phone: [phone] Fax: [phone]    [facility] SPECIALTY [place] - [place] - [address] [place]  [address] [place]  [place] [address]  Phone: [phone] Fax: [phone]    [facility] [place], [place] - [address]  [address] [facility] [address] [facility] [address]  Phone: [phone] Fax: [phone]    [facility] [place] - [address]  [address]  [place] [address]  Phone: [phone] Fax: [phone]    Primary Care Provider: Glo Valente DO    [facility]  Secondary Insurance:     DISCHARGE DETAILS:                                                                                                               Facility Insurance Auth Number: [id number]

## 2024-12-24 NOTE — NURSING NOTE
Attempted to call report to [facility] 4 times. Got no answer. Faxed in paperwork. Our callback number is [phone].

## 2024-12-24 NOTE — PLAN OF CARE
Problem: Prexisting or High Potential for Compromised Skin Integrity  Goal: Skin integrity is maintained or improved  Description: INTERVENTIONS:  - Identify patients at risk for skin breakdown  - Assess and monitor skin integrity  - Assess and monitor nutrition and hydration status  - Monitor labs   - Assess for incontinence   - Turn and reposition patient  - Assist with mobility/ambulation  - Relieve pressure over bony prominences  - Avoid friction and shearing  - Provide appropriate hygiene as needed including keeping skin clean and dry  - Evaluate need for skin moisturizer/barrier cream  - Collaborate with interdisciplinary team   - Patient/family teaching  - Consider wound care consult   Outcome: Progressing     Problem: PAIN - ADULT  Goal: Verbalizes/displays adequate comfort level or baseline comfort level  Description: Interventions:  - Encourage patient to monitor pain and request assistance  - Assess pain using appropriate pain scale  - Administer analgesics based on type and severity of pain and evaluate response  - Implement non-pharmacological measures as appropriate and evaluate response  - Consider cultural and social influences on pain and pain management  - Notify physician/advanced practitioner if interventions unsuccessful or patient reports new pain  Outcome: Progressing     Problem: INFECTION - ADULT  Goal: Absence or prevention of progression during hospitalization  Description: INTERVENTIONS:  - Assess and monitor for signs and symptoms of infection  - Monitor lab/diagnostic results  - Monitor all insertion sites, i.e. indwelling lines, tubes, and drains  - Monitor endotracheal if appropriate and nasal secretions for changes in amount and color  - [place] appropriate cooling/warming therapies per order  - Administer medications as ordered  - Instruct and encourage patient and family to use good hand hygiene technique  - Identify and instruct in appropriate isolation precautions for  identified infection/condition  Outcome: Progressing  Goal: Absence of fever/infection during neutropenic period  Description: INTERVENTIONS:  - Monitor WBC    Outcome: Progressing     Problem: SAFETY ADULT  Goal: Patient will remain free of falls  Description: INTERVENTIONS:  - Educate patient/family on patient safety including physical limitations  - Instruct patient to call for assistance with activity   - Consult OT/PT to assist with strengthening/mobility   - Keep Call bell within reach  - Keep bed low and locked with side rails adjusted as appropriate  - Keep care items and personal belongings within reach  - Initiate and maintain comfort rounds  - Make Fall Risk Sign visible to staff  - Offer Toileting every 2 Hours, in advance of need  - Initiate/Maintain bed/chairalarm  - Obtain necessary fall risk management equipment: walker  - Apply yellow socks and bracelet for high fall risk patients  - Consider moving patient to room near nurses station  Outcome: Progressing  Goal: Maintain or return to baseline ADL function  Description: INTERVENTIONS:  -  Assess patient's ability to carry out ADLs; assess patient's baseline for ADL function and identify physical deficits which impact ability to perform ADLs (bathing, care of mouth/teeth, toileting, grooming, dressing, etc.)  - Assess/evaluate cause of self-care deficits   - Assess range of motion  - Assess patient's mobility; develop plan if impaired  - Assess patient's need for assistive devices and provide as appropriate  - Encourage maximum independence but intervene and supervise when necessary  - Involve family in performance of ADLs  - Assess for home care needs following discharge   - Consider OT consult to assist with ADL evaluation and planning for discharge  - Provide patient education as appropriate  Outcome: Progressing  Goal: Maintains/Returns to pre admission functional level  Description: INTERVENTIONS:  - Perform AM-PAC 6 Click Basic Mobility/ Daily  Activity assessment daily.  - Set and communicate daily mobility goal to care team and patient/family/caregiver.   - Collaborate with rehabilitation services on mobility goals if consulted  - Perform Range of Motion 3 times a day.  - Reposition patient every 2 hours.  - Dangle patient 3 times a day  - Stand patient 3 times a day  - Ambulate patient 3 times a day  - Out of bed to chair 3 times a day   - Out of bed for meals 3 times a day  - Out of bed for toileting  - Record patient progress and toleration of activity level   Outcome: Progressing     Problem: DISCHARGE PLANNING  Goal: Discharge to home or other facility with appropriate resources  Description: INTERVENTIONS:  - Identify barriers to discharge w/patient and caregiver  - Arrange for needed discharge resources and transportation as appropriate  - Identify discharge learning needs (meds, wound care, etc.)  - Arrange for interpretive services to assist at discharge as needed  - Refer to Case Management Department for coordinating discharge planning if the patient needs post-hospital services based on physician/advanced practitioner order or complex needs related to functional status, cognitive ability, or social support system  Outcome: Progressing     Problem: Knowledge Deficit  Goal: Patient/family/caregiver demonstrates understanding of disease process, treatment plan, medications, and discharge instructions  Description: Complete learning assessment and assess knowledge base.  Interventions:  - Provide teaching at level of understanding  - Provide teaching via preferred learning methods  Outcome: Progressing

## 2024-12-24 NOTE — CASE MANAGEMENT
Case Management Discharge Planning Note    Patient name Samantha Rodriguez  Location /-01 MRN [id number]  : 1965 Date 2024       Current Admission Date: 2024  Current Admission Diagnosis:Spinal stenosis of lumbar region with neurogenic claudication   Patient Active Problem List    Diagnosis Date Noted Date Diagnosed    Acute blood loss anemia 2024     Pain associated with surgical procedure 2024     Toxic encephalopathy 2024     Severe episode of recurrent major depressive disorder, without psychotic features ([facility]) 2024     Chronic low back pain, unspecified back pain laterality, unspecified whether sciatica present 2024     Opioid-induced constipation 2024     Weight gain 2024     Numbness 2023     Memory loss 2023     Hemiplegia, post-stroke ([facility]) 2022     Hypokalemia 2022     Slow transit constipation 03/15/2022     CVA (cerebral vascular accident) ([facility]) 2022     Mixed hyperlipidemia 2021     History of CVA (cerebrovascular accident) 2021     Dysphagia 2020     Ambulatory dysfunction 2020     Status post insertion of spinal cord stimulator 2020     Tardive dyskinesia 2020     MIMI (obstructive sleep apnea)      Obesity, morbid ([facility]) 2020     Post laminectomy syndrome 10/07/2019     Bipolar I disorder, most recent episode depressed ([facility]) 2019 09/15/2023    Spinal stenosis of lumbar region with neurogenic claudication 2018     Lumbar radiculopathy 2017     Chronic pain disorder 2017     Lumbar spondylosis 10/31/2016     Generalized anxiety disorder with panic attacks 10/26/2016     Bipolar disorder ([facility]) 2015     PTSD (post-traumatic stress disorder) 2015     Panic disorder without agoraphobia 2015     Tremor 2014     Migraine 2014     GERD without esophagitis 2014     Cognitive disorder 2014      Overactive bladder 09/26/2013     Mood insomnia (HCC) 01/31/2013     Urinary incontinence 09/24/2012     Vitamin D deficiency 09/18/2012     Fibromyalgia 09/14/2012     Essential hypertension 09/14/2012       LOS (days): 5  Geometric Mean LOS (GMLOS) (days): 6.3  Days to GMLOS:1.4     OBJECTIVE:  Risk of Unplanned Readmission Score: 24.39         Current admission status: Inpatient   Preferred Pharmacy:   [place], [place] - [address] [facility]  [address] [facility] [address]  Phone: [phone] Fax: [phone]    [facility]/pharmacy #[id number] - [place] - [address]  [address]  [place] [address]  Phone: [phone] Fax: [phone]    [facility] [place] - [place] - [address] [place]  [address] [place] [address]  Phone: [phone] Fax: [phone]    [facility] [place], [place] - [address]  [address] [address]  Phone: [phone] Fax: [phone]    [facility] [place] - [address]  [address]  [place] [address]  Phone: [phone] Fax: [phone]    Primary Care Provider: Glo Valente DO    Primary Insurance: [facility]  Secondary Insurance:     DISCHARGE DETAILS:    Discharge planning discussed with:: Patient  Freedom of Choice: Yes  Comments - Freedom of Choice: Discussed FOC  CM contacted family/caregiver?: Yes  Were Treatment Team discharge recommendations reviewed with patient/caregiver?: Yes  Did patient/caregiver verbalize understanding of patient care needs?: N/A- going to facility  Were patient/caregiver advised of the risks associated with not following Treatment Team discharge recommendations?: Yes    Contacts  Patient Contacts: Cyrie  Relationship to Patient:: Family  Contact Method: Phone  Phone Number: [phone]  Reason/Outcome: Continuity of  Care       Treatment Team Recommendation: Facility Return  Discharge Destination Plan:: Facility Return  Transport at Discharge : [facility]     Number/Name of Dispatcher: DEIDRA [phone]  Transported by (Company and Unit #): [employer] [phone]  ETA of Transport (Date): 12/24/24  ETA of Transport (Time): 1230          Accepting Facility Name, City & State : [facility]  Receiving Facility/Agency Phone Number: [phone]  Facility/Agency Fax Number: [phone]         Notified by provider pt clear for dc today. Pt is a LTC resident at [facility].  Pt clear to return to facility.  Transport arranged. Tasked CM DCS to submit for auth for S transport.      CM called pt's dtr Tunde to update of dc today - no answer - left VM.  Attempted to call pt's son Mick - no answer.      CM met w/pt at bedside to update of return to LTC facilty. Pt verbalized understanding & in agreement w/dcp & accepting facility. Per pt she spoke to her son Mick and notified him of same.     CM notified provider, bedside nurse and facility of transport time.  AVS uploaded in A[employer].

## 2024-12-26 ENCOUNTER — TELEPHONE (OUTPATIENT)
Dept: NEUROLOGY | Facility: CLINIC | Age: 59
End: 2024-12-26

## 2024-12-26 ENCOUNTER — TELEPHONE (OUTPATIENT)
Age: 59
End: 2024-12-26

## 2024-12-26 DIAGNOSIS — Z98.1 S/P LUMBAR FUSION: Primary | ICD-10-CM

## 2024-12-26 RX ORDER — MORPHINE SULFATE 15 MG/1
15 TABLET ORAL EVERY 6 HOURS PRN
Qty: 30 TABLET | Refills: 0 | Status: SHIPPED | OUTPATIENT
Start: 2024-12-26

## 2024-12-26 NOTE — TELEPHONE ENCOUNTER
Left VM for angel Andrade will be out of office tomorrow (12/27) and we have to r/s appt. Provided call back number.  Please r/s 12/27/24 carlos James

## 2024-12-26 NOTE — CASE MANAGEMENT
Call made into [facility] P# [phone] to check status of pending transportation auth. Spoke to Ainsley CRAWLEY Who stated auth not req'd since transport was from facility to facility.    CM notified: Vikki RAMIREZ

## 2024-12-26 NOTE — UTILIZATION REVIEW
NOTIFICATION OF ADMISSION DISCHARGE   This is a Notification of Discharge from [facility]. Please be advised that this patient has been discharge from our facility. Below you will find the admission and discharge date and time including the patient’s disposition.   UTILIZATION REVIEW CONTACT:  Christiana Xiong  Utilization   Network Utilization Review Department  Phone: [phone] x carefully listen to the prompts. All voicemails are confidential.  Email: [email or web address]     ADMISSION INFORMATION  PRESENTATION DATE: 12/19/2024  5:50 AM  OBERVATION ADMISSION DATE: N/A  INPATIENT ADMISSION DATE: 12/19/24 12:57 PM   DISCHARGE DATE: 12/24/2024  1:15 PM   DISPOSITION:Non [facility] SNF/TCU/SNU    Network Utilization Review Department  ATTENTION: Please call with any questions or concerns to [phone] and carefully listen to the prompts so that you are directed to the right person. All voicemails are confidential.   For Discharge needs, contact Care Management DC Support Team at [phone] opt. 2  Send all requests for admission clinical reviews, approved or denied determinations and any other requests to dedicated fax number below belonging to the campus where the patient is receiving treatment. List of dedicated fax numbers for the Facilities:  FACILITY NAME UR FAX NUMBER   ADMISSION DENIALS (Administrative/Medical Necessity) [phone]   DISCHARGE SUPPORT TEAM ([facility]) [phone]   PARENT CHILD HEALTH (Maternity/NICU/Pediatrics) [phone]   [facility] [phone]   [facility] [phone]   [facility] [phone]   [facility] [phone]   [facility] [phone]   [facility] [phone]   [facility] [phone]   [facility]  [phone]   [facility] [phone]   [facility] [phone]   [facility] [phone]   [facility] [phone]

## 2024-12-26 NOTE — TELEPHONE ENCOUNTER
Caller: Patient    Doctor: Hilda Torrez    Reason for call: Patient is calling in regards to her surgery on 12/19 and states she was supposed to have a prescription for oxycodone 10 MG 4x a day sent to her pharmacy, but nothing was sent and she has been dealing with a lot of pain. Please advise patient.     Call back#: [phone]

## 2024-12-27 NOTE — TELEPHONE ENCOUNTER
12/27/24    Related Message to Patient.    Patient UNDERSTOOD and AGREED to Reschedule appt.     Appt rescheduled for 03/27/25, 3:30 PM With Mr. James at the [place] Location.      Any questions, please contact Patient.  Thank You.

## 2024-12-27 NOTE — TELEPHONE ENCOUNTER
12/27/24    Patient called The Neurology Office thinking that she was calling the Orthopedic Office.    Patient is requesting for Oxycodone Script to be sent to the Pharmacy.    Per Patient she stated that she was supposed have a script sent to the Pharmacy after her Surgery.     Patient stated that she is still in pain and the Current Medication that she is taking is not working.       NOTE:   I MADE NO PROMISES, but that I gave the patient my word that I will send a 1-Time courtesy Message to Ortho, due that I'm from the Neurology Department and Not Ortho.    Patient UNDERSTOOD, AGREED, and EXPRESSED HER THANKS.    I also provided Ortho Contact Number [phone].      Please contact Patient with any status of her Request.  Thank You.      My Apologies for the Trouble.

## 2025-01-02 ENCOUNTER — APPOINTMENT (EMERGENCY)
Dept: CT IMAGING | Facility: HOSPITAL | Age: 60
End: 2025-01-02
Payer: MEDICARE

## 2025-01-02 ENCOUNTER — TELEPHONE (OUTPATIENT)
Age: 60
End: 2025-01-02

## 2025-01-02 ENCOUNTER — HOSPITAL ENCOUNTER (INPATIENT)
Facility: HOSPITAL | Age: 60
LOS: 6 days | End: 2025-01-09
Admitting: INTERNAL MEDICINE
Payer: MEDICARE

## 2025-01-02 DIAGNOSIS — R10.9 ABDOMINAL PAIN: ICD-10-CM

## 2025-01-02 DIAGNOSIS — M54.50 CHRONIC LOW BACK PAIN, UNSPECIFIED BACK PAIN LATERALITY, UNSPECIFIED WHETHER SCIATICA PRESENT: ICD-10-CM

## 2025-01-02 DIAGNOSIS — R26.2 AMBULATORY DYSFUNCTION: Primary | ICD-10-CM

## 2025-01-02 DIAGNOSIS — G89.29 CHRONIC LOW BACK PAIN, UNSPECIFIED BACK PAIN LATERALITY, UNSPECIFIED WHETHER SCIATICA PRESENT: ICD-10-CM

## 2025-01-02 DIAGNOSIS — S31.000A WOUND OF SACRAL REGION, INITIAL ENCOUNTER: ICD-10-CM

## 2025-01-02 LAB
ANION GAP SERPL CALCULATED.3IONS-SCNC: 13 MMOL/L (ref 4–13)
ANISOCYTOSIS BLD QL SMEAR: PRESENT
BASOPHILS # BLD MANUAL: 0 THOUSAND/UL (ref 0–0.1)
BASOPHILS NFR MAR MANUAL: 0 % (ref 0–1)
BUN SERPL-MCNC: 8 MG/DL (ref 5–25)
CALCIUM SERPL-MCNC: 8.9 MG/DL (ref 8.4–10.2)
CHLORIDE SERPL-SCNC: 99 MMOL/L (ref 96–108)
CO2 SERPL-SCNC: 23 MMOL/L (ref 21–32)
CREAT SERPL-MCNC: 0.69 MG/DL (ref 0.6–1.3)
EOSINOPHIL # BLD MANUAL: 0 THOUSAND/UL (ref 0–0.4)
EOSINOPHIL NFR BLD MANUAL: 0 % (ref 0–6)
ERYTHROCYTE [DISTWIDTH] IN BLOOD BY AUTOMATED COUNT: 13.9 % (ref 11.6–15.1)
GFR SERPL CREATININE-BSD FRML MDRD: 94 ML/MIN/1.73SQ M
GLUCOSE SERPL-MCNC: 101 MG/DL (ref 65–140)
HCT VFR BLD AUTO: 32.7 % (ref 34.8–46.1)
HGB BLD-MCNC: 10.5 G/DL (ref 11.5–15.4)
LG PLATELETS BLD QL SMEAR: PRESENT
LYMPHOCYTES # BLD AUTO: 0.22 THOUSAND/UL (ref 0.6–4.47)
LYMPHOCYTES # BLD AUTO: 2 % (ref 14–44)
MCH RBC QN AUTO: 27.4 PG (ref 26.8–34.3)
MCHC RBC AUTO-ENTMCNC: 32.1 G/DL (ref 31.4–37.4)
MCV RBC AUTO: 85 FL (ref 82–98)
MONOCYTES # BLD AUTO: 0.78 THOUSAND/UL (ref 0–1.22)
MONOCYTES NFR BLD: 7 % (ref 4–12)
NEUTROPHILS # BLD MANUAL: 10.18 THOUSAND/UL (ref 1.85–7.62)
NEUTS BAND NFR BLD MANUAL: 1 % (ref 0–8)
NEUTS SEG NFR BLD AUTO: 90 % (ref 43–75)
OVALOCYTES BLD QL SMEAR: PRESENT
PLATELET # BLD AUTO: 434 THOUSANDS/UL (ref 149–390)
PLATELET BLD QL SMEAR: ABNORMAL
PMV BLD AUTO: 9.5 FL (ref 8.9–12.7)
POLYCHROMASIA BLD QL SMEAR: PRESENT
POTASSIUM SERPL-SCNC: 3.6 MMOL/L (ref 3.5–5.3)
RBC # BLD AUTO: 3.83 MILLION/UL (ref 3.81–5.12)
RBC MORPH BLD: PRESENT
SODIUM SERPL-SCNC: 135 MMOL/L (ref 135–147)
WBC # BLD AUTO: 11.19 THOUSAND/UL (ref 4.31–10.16)

## 2025-01-02 PROCEDURE — 80048 BASIC METABOLIC PNL TOTAL CA: CPT

## 2025-01-02 PROCEDURE — 99285 EMERGENCY DEPT VISIT HI MDM: CPT

## 2025-01-02 PROCEDURE — 72131 CT LUMBAR SPINE W/O DYE: CPT

## 2025-01-02 PROCEDURE — 36415 COLL VENOUS BLD VENIPUNCTURE: CPT

## 2025-01-02 PROCEDURE — 96374 THER/PROPH/DIAG INJ IV PUSH: CPT

## 2025-01-02 PROCEDURE — 99284 EMERGENCY DEPT VISIT MOD MDM: CPT

## 2025-01-02 PROCEDURE — 85007 BL SMEAR W/DIFF WBC COUNT: CPT

## 2025-01-02 PROCEDURE — 85027 COMPLETE CBC AUTOMATED: CPT

## 2025-01-02 RX ORDER — HYDROMORPHONE HCL/PF 1 MG/ML
0.5 SYRINGE (ML) INJECTION EVERY 6 HOURS PRN
Status: DISCONTINUED | OUTPATIENT
Start: 2025-01-02 | End: 2025-01-05

## 2025-01-02 RX ORDER — HYDROMORPHONE HCL/PF 1 MG/ML
0.5 SYRINGE (ML) INJECTION ONCE
Status: COMPLETED | OUTPATIENT
Start: 2025-01-02 | End: 2025-01-02

## 2025-01-02 RX ORDER — POTASSIUM CHLORIDE 1500 MG/1
20 TABLET, EXTENDED RELEASE ORAL ONCE
Status: COMPLETED | OUTPATIENT
Start: 2025-01-02 | End: 2025-01-02

## 2025-01-02 RX ORDER — ACETAMINOPHEN 325 MG/1
975 TABLET ORAL EVERY 8 HOURS SCHEDULED
Status: DISCONTINUED | OUTPATIENT
Start: 2025-01-02 | End: 2025-01-09 | Stop reason: HOSPADM

## 2025-01-02 RX ORDER — OXYCODONE HYDROCHLORIDE 5 MG/1
5 TABLET ORAL EVERY 6 HOURS PRN
Refills: 0 | Status: DISCONTINUED | OUTPATIENT
Start: 2025-01-02 | End: 2025-01-03

## 2025-01-02 RX ORDER — LIDOCAINE 50 MG/G
1 PATCH TOPICAL DAILY
Status: DISCONTINUED | OUTPATIENT
Start: 2025-01-02 | End: 2025-01-09 | Stop reason: HOSPADM

## 2025-01-02 RX ADMIN — POTASSIUM CHLORIDE 20 MEQ: 1500 TABLET, EXTENDED RELEASE ORAL at 23:08

## 2025-01-02 RX ADMIN — ACETAMINOPHEN 975 MG: 325 TABLET, FILM COATED ORAL at 23:25

## 2025-01-02 RX ADMIN — LIDOCAINE 1 PATCH: 50 PATCH TOPICAL at 23:25

## 2025-01-02 RX ADMIN — HYDROMORPHONE HYDROCHLORIDE 0.5 MG: 1 INJECTION, SOLUTION INTRAMUSCULAR; INTRAVENOUS; SUBCUTANEOUS at 21:55

## 2025-01-02 NOTE — TELEPHONE ENCOUNTER
Lvm letting her know that Dr. Stevens will be out of the office the morning of on 1/6/25 so we can reschedule for the afternoon since she's PO or have her be seen on another day

## 2025-01-03 ENCOUNTER — APPOINTMENT (OUTPATIENT)
Dept: RADIOLOGY | Facility: HOSPITAL | Age: 60
End: 2025-01-03
Payer: MEDICARE

## 2025-01-03 ENCOUNTER — APPOINTMENT (INPATIENT)
Dept: RADIOLOGY | Facility: HOSPITAL | Age: 60
End: 2025-01-03
Payer: MEDICARE

## 2025-01-03 PROBLEM — R65.10 SIRS (SYSTEMIC INFLAMMATORY RESPONSE SYNDROME) (HCC): Status: ACTIVE | Noted: 2020-03-09

## 2025-01-03 LAB
ANION GAP SERPL CALCULATED.3IONS-SCNC: 8 MMOL/L (ref 4–13)
BACTERIA UR QL AUTO: ABNORMAL /HPF
BASOPHILS # BLD AUTO: 0.04 THOUSANDS/ΜL (ref 0–0.1)
BASOPHILS NFR BLD AUTO: 0 % (ref 0–1)
BILIRUB UR QL STRIP: ABNORMAL
BUN SERPL-MCNC: 10 MG/DL (ref 5–25)
CALCIUM SERPL-MCNC: 8.4 MG/DL (ref 8.4–10.2)
CHLORIDE SERPL-SCNC: 101 MMOL/L (ref 96–108)
CLARITY UR: ABNORMAL
CO2 SERPL-SCNC: 27 MMOL/L (ref 21–32)
COLOR UR: ABNORMAL
CREAT SERPL-MCNC: 0.92 MG/DL (ref 0.6–1.3)
CRP SERPL QL: 231.1 MG/L
EOSINOPHIL # BLD AUTO: 0.06 THOUSAND/ΜL (ref 0–0.61)
EOSINOPHIL NFR BLD AUTO: 1 % (ref 0–6)
ERYTHROCYTE [DISTWIDTH] IN BLOOD BY AUTOMATED COUNT: 14 % (ref 11.6–15.1)
ERYTHROCYTE [SEDIMENTATION RATE] IN BLOOD: 81 MM/HOUR (ref 0–29)
FLUAV RNA RESP QL NAA+PROBE: NEGATIVE
FLUBV RNA RESP QL NAA+PROBE: NEGATIVE
GFR SERPL CREATININE-BSD FRML MDRD: 67 ML/MIN/1.73SQ M
GLUCOSE P FAST SERPL-MCNC: 143 MG/DL (ref 65–99)
GLUCOSE SERPL-MCNC: 143 MG/DL (ref 65–140)
GLUCOSE UR STRIP-MCNC: NEGATIVE MG/DL
HCT VFR BLD AUTO: 28.7 % (ref 34.8–46.1)
HGB BLD-MCNC: 9.3 G/DL (ref 11.5–15.4)
HGB UR QL STRIP.AUTO: ABNORMAL
HYALINE CASTS #/AREA URNS LPF: ABNORMAL /LPF
IMM GRANULOCYTES # BLD AUTO: 0.07 THOUSAND/UL (ref 0–0.2)
IMM GRANULOCYTES NFR BLD AUTO: 1 % (ref 0–2)
KETONES UR STRIP-MCNC: ABNORMAL MG/DL
LACTATE SERPL-SCNC: 0.8 MMOL/L (ref 0.5–2)
LEUKOCYTE ESTERASE UR QL STRIP: ABNORMAL
LYMPHOCYTES # BLD AUTO: 0.81 THOUSANDS/ΜL (ref 0.6–4.47)
LYMPHOCYTES NFR BLD AUTO: 9 % (ref 14–44)
MAGNESIUM SERPL-MCNC: 2.1 MG/DL (ref 1.9–2.7)
MCH RBC QN AUTO: 27.8 PG (ref 26.8–34.3)
MCHC RBC AUTO-ENTMCNC: 32.4 G/DL (ref 31.4–37.4)
MCV RBC AUTO: 86 FL (ref 82–98)
MONOCYTES # BLD AUTO: 0.79 THOUSAND/ΜL (ref 0.17–1.22)
MONOCYTES NFR BLD AUTO: 8 % (ref 4–12)
MUCOUS THREADS UR QL AUTO: ABNORMAL
NEUTROPHILS # BLD AUTO: 7.73 THOUSANDS/ΜL (ref 1.85–7.62)
NEUTS SEG NFR BLD AUTO: 81 % (ref 43–75)
NITRITE UR QL STRIP: NEGATIVE
NON-SQ EPI CELLS URNS QL MICRO: ABNORMAL /HPF
NRBC BLD AUTO-RTO: 0 /100 WBCS
PH UR STRIP.AUTO: 6.5 [PH]
PLATELET # BLD AUTO: 405 THOUSANDS/UL (ref 149–390)
PMV BLD AUTO: 9.5 FL (ref 8.9–12.7)
POTASSIUM SERPL-SCNC: 3.6 MMOL/L (ref 3.5–5.3)
PROCALCITONIN SERPL-MCNC: 0.36 NG/ML
PROT UR STRIP-MCNC: ABNORMAL MG/DL
RBC # BLD AUTO: 3.34 MILLION/UL (ref 3.81–5.12)
RBC #/AREA URNS AUTO: ABNORMAL /HPF
RENAL EPI CELLS #/AREA URNS HPF: PRESENT /[HPF]
RSV RNA RESP QL NAA+PROBE: POSITIVE
SARS-COV-2 RNA RESP QL NAA+PROBE: NEGATIVE
SODIUM SERPL-SCNC: 136 MMOL/L (ref 135–147)
SP GR UR STRIP.AUTO: 1.02 (ref 1–1.03)
UROBILINOGEN UR STRIP-ACNC: >=12 MG/DL
WBC # BLD AUTO: 9.5 THOUSAND/UL (ref 4.31–10.16)
WBC #/AREA URNS AUTO: ABNORMAL /HPF

## 2025-01-03 PROCEDURE — 71045 X-RAY EXAM CHEST 1 VIEW: CPT

## 2025-01-03 PROCEDURE — 0241U HB NFCT DS VIR RESP RNA 4 TRGT: CPT | Performed by: INTERNAL MEDICINE

## 2025-01-03 PROCEDURE — 80048 BASIC METABOLIC PNL TOTAL CA: CPT

## 2025-01-03 PROCEDURE — 84145 PROCALCITONIN (PCT): CPT

## 2025-01-03 PROCEDURE — 73502 X-RAY EXAM HIP UNI 2-3 VIEWS: CPT

## 2025-01-03 PROCEDURE — 87186 SC STD MICRODIL/AGAR DIL: CPT

## 2025-01-03 PROCEDURE — 87086 URINE CULTURE/COLONY COUNT: CPT

## 2025-01-03 PROCEDURE — 85025 COMPLETE CBC W/AUTO DIFF WBC: CPT

## 2025-01-03 PROCEDURE — 97163 PT EVAL HIGH COMPLEX 45 MIN: CPT

## 2025-01-03 PROCEDURE — 83605 ASSAY OF LACTIC ACID: CPT

## 2025-01-03 PROCEDURE — 83735 ASSAY OF MAGNESIUM: CPT

## 2025-01-03 PROCEDURE — 81001 URINALYSIS AUTO W/SCOPE: CPT

## 2025-01-03 PROCEDURE — 99222 1ST HOSP IP/OBS MODERATE 55: CPT | Performed by: INTERNAL MEDICINE

## 2025-01-03 PROCEDURE — 86140 C-REACTIVE PROTEIN: CPT

## 2025-01-03 PROCEDURE — 97167 OT EVAL HIGH COMPLEX 60 MIN: CPT

## 2025-01-03 PROCEDURE — 99024 POSTOP FOLLOW-UP VISIT: CPT

## 2025-01-03 PROCEDURE — 87077 CULTURE AEROBIC IDENTIFY: CPT

## 2025-01-03 PROCEDURE — 87040 BLOOD CULTURE FOR BACTERIA: CPT

## 2025-01-03 PROCEDURE — 85652 RBC SED RATE AUTOMATED: CPT

## 2025-01-03 RX ORDER — PANTOPRAZOLE SODIUM 40 MG/1
40 TABLET, DELAYED RELEASE ORAL
Status: DISCONTINUED | OUTPATIENT
Start: 2025-01-03 | End: 2025-01-08

## 2025-01-03 RX ORDER — CYCLOBENZAPRINE HCL 10 MG
10 TABLET ORAL 3 TIMES DAILY PRN
Status: DISCONTINUED | OUTPATIENT
Start: 2025-01-03 | End: 2025-01-05

## 2025-01-03 RX ORDER — PRAZOSIN HYDROCHLORIDE 2 MG/1
2 CAPSULE ORAL
Status: DISCONTINUED | OUTPATIENT
Start: 2025-01-03 | End: 2025-01-09 | Stop reason: HOSPADM

## 2025-01-03 RX ORDER — HYDROXYZINE HYDROCHLORIDE 25 MG/1
25 TABLET, FILM COATED ORAL 3 TIMES DAILY
Status: DISCONTINUED | OUTPATIENT
Start: 2025-01-03 | End: 2025-01-09 | Stop reason: HOSPADM

## 2025-01-03 RX ORDER — BISACODYL 10 MG
10 SUPPOSITORY, RECTAL RECTAL DAILY PRN
Status: DISCONTINUED | OUTPATIENT
Start: 2025-01-03 | End: 2025-01-09 | Stop reason: HOSPADM

## 2025-01-03 RX ORDER — SERTRALINE HYDROCHLORIDE 100 MG/1
200 TABLET, FILM COATED ORAL
Status: DISCONTINUED | OUTPATIENT
Start: 2025-01-03 | End: 2025-01-09 | Stop reason: HOSPADM

## 2025-01-03 RX ORDER — ENOXAPARIN SODIUM 100 MG/ML
40 INJECTION SUBCUTANEOUS DAILY
Status: DISCONTINUED | OUTPATIENT
Start: 2025-01-03 | End: 2025-01-09 | Stop reason: HOSPADM

## 2025-01-03 RX ORDER — ONDANSETRON 2 MG/ML
4 INJECTION INTRAMUSCULAR; INTRAVENOUS EVERY 6 HOURS PRN
Status: DISCONTINUED | OUTPATIENT
Start: 2025-01-03 | End: 2025-01-09 | Stop reason: HOSPADM

## 2025-01-03 RX ORDER — ARIPIPRAZOLE 10 MG/1
10 TABLET ORAL DAILY
Status: DISCONTINUED | OUTPATIENT
Start: 2025-01-03 | End: 2025-01-09 | Stop reason: HOSPADM

## 2025-01-03 RX ORDER — OXYCODONE HYDROCHLORIDE 5 MG/1
5 TABLET ORAL EVERY 4 HOURS PRN
Refills: 0 | Status: DISCONTINUED | OUTPATIENT
Start: 2025-01-03 | End: 2025-01-07

## 2025-01-03 RX ORDER — PREGABALIN 75 MG/1
150 CAPSULE ORAL 3 TIMES DAILY
Status: DISCONTINUED | OUTPATIENT
Start: 2025-01-03 | End: 2025-01-09 | Stop reason: HOSPADM

## 2025-01-03 RX ORDER — LUBIPROSTONE 8 UG/1
8 CAPSULE ORAL 2 TIMES DAILY
Status: DISCONTINUED | OUTPATIENT
Start: 2025-01-03 | End: 2025-01-09 | Stop reason: HOSPADM

## 2025-01-03 RX ORDER — ASPIRIN 81 MG/1
81 TABLET ORAL DAILY
Status: DISCONTINUED | OUTPATIENT
Start: 2025-01-03 | End: 2025-01-09 | Stop reason: HOSPADM

## 2025-01-03 RX ORDER — CEFAZOLIN SODIUM 2 G/50ML
2000 SOLUTION INTRAVENOUS EVERY 8 HOURS
Status: DISCONTINUED | OUTPATIENT
Start: 2025-01-03 | End: 2025-01-09 | Stop reason: HOSPADM

## 2025-01-03 RX ORDER — MORPHINE SULFATE 15 MG/1
15 TABLET ORAL EVERY 8 HOURS PRN
Refills: 0 | Status: DISCONTINUED | OUTPATIENT
Start: 2025-01-03 | End: 2025-01-03

## 2025-01-03 RX ORDER — MAGNESIUM HYDROXIDE/ALUMINUM HYDROXICE/SIMETHICONE 120; 1200; 1200 MG/30ML; MG/30ML; MG/30ML
30 SUSPENSION ORAL DAILY
Status: DISCONTINUED | OUTPATIENT
Start: 2025-01-03 | End: 2025-01-05

## 2025-01-03 RX ORDER — AMLODIPINE BESYLATE 5 MG/1
5 TABLET ORAL DAILY
Status: DISCONTINUED | OUTPATIENT
Start: 2025-01-03 | End: 2025-01-09 | Stop reason: HOSPADM

## 2025-01-03 RX ORDER — ATORVASTATIN CALCIUM 40 MG/1
40 TABLET, FILM COATED ORAL
Status: DISCONTINUED | OUTPATIENT
Start: 2025-01-03 | End: 2025-01-09 | Stop reason: HOSPADM

## 2025-01-03 RX ORDER — OXYCODONE HYDROCHLORIDE 10 MG/1
10 TABLET ORAL EVERY 4 HOURS PRN
Refills: 0 | Status: DISCONTINUED | OUTPATIENT
Start: 2025-01-03 | End: 2025-01-07

## 2025-01-03 RX ORDER — AMOXICILLIN 250 MG
1 CAPSULE ORAL DAILY PRN
Status: DISCONTINUED | OUTPATIENT
Start: 2025-01-03 | End: 2025-01-04

## 2025-01-03 RX ADMIN — OXYCODONE HYDROCHLORIDE 10 MG: 10 TABLET ORAL at 09:19

## 2025-01-03 RX ADMIN — ATORVASTATIN CALCIUM 40 MG: 40 TABLET, FILM COATED ORAL at 17:49

## 2025-01-03 RX ADMIN — ONDANSETRON 4 MG: 2 INJECTION INTRAMUSCULAR; INTRAVENOUS at 09:43

## 2025-01-03 RX ADMIN — PREGABALIN 150 MG: 75 CAPSULE ORAL at 09:01

## 2025-01-03 RX ADMIN — LUBIPROSTONE 8 MCG: 8 CAPSULE, GELATIN COATED ORAL at 09:12

## 2025-01-03 RX ADMIN — BUSPIRONE HYDROCHLORIDE 15 MG: 10 TABLET ORAL at 09:00

## 2025-01-03 RX ADMIN — ACETAMINOPHEN 975 MG: 325 TABLET, FILM COATED ORAL at 06:11

## 2025-01-03 RX ADMIN — PANTOPRAZOLE SODIUM 40 MG: 40 TABLET, DELAYED RELEASE ORAL at 06:11

## 2025-01-03 RX ADMIN — AMLODIPINE BESYLATE 5 MG: 5 TABLET ORAL at 09:00

## 2025-01-03 RX ADMIN — ACETAMINOPHEN 975 MG: 325 TABLET, FILM COATED ORAL at 14:24

## 2025-01-03 RX ADMIN — HYDROXYZINE HYDROCHLORIDE 25 MG: 25 TABLET ORAL at 17:49

## 2025-01-03 RX ADMIN — ASPIRIN 81 MG: 81 TABLET, COATED ORAL at 09:01

## 2025-01-03 RX ADMIN — HYDROMORPHONE HYDROCHLORIDE 0.5 MG: 1 INJECTION, SOLUTION INTRAMUSCULAR; INTRAVENOUS; SUBCUTANEOUS at 14:24

## 2025-01-03 RX ADMIN — PREGABALIN 150 MG: 75 CAPSULE ORAL at 21:46

## 2025-01-03 RX ADMIN — SERTRALINE HYDROCHLORIDE 200 MG: 100 TABLET ORAL at 21:47

## 2025-01-03 RX ADMIN — ENOXAPARIN SODIUM 40 MG: 40 INJECTION SUBCUTANEOUS at 09:00

## 2025-01-03 RX ADMIN — CEFAZOLIN SODIUM 2000 MG: 2 SOLUTION INTRAVENOUS at 21:48

## 2025-01-03 RX ADMIN — PRAZOSIN HYDROCHLORIDE 2 MG: 2 CAPSULE ORAL at 21:55

## 2025-01-03 RX ADMIN — OXYCODONE HYDROCHLORIDE 10 MG: 10 TABLET ORAL at 17:50

## 2025-01-03 RX ADMIN — LUBIPROSTONE 8 MCG: 8 CAPSULE, GELATIN COATED ORAL at 21:52

## 2025-01-03 RX ADMIN — HYDROMORPHONE HYDROCHLORIDE 0.5 MG: 1 INJECTION, SOLUTION INTRAMUSCULAR; INTRAVENOUS; SUBCUTANEOUS at 04:05

## 2025-01-03 RX ADMIN — CEFTRIAXONE 1000 MG: 10 INJECTION, POWDER, FOR SOLUTION INTRAVENOUS at 08:58

## 2025-01-03 RX ADMIN — HYDROXYZINE HYDROCHLORIDE 25 MG: 25 TABLET ORAL at 21:46

## 2025-01-03 RX ADMIN — ACETAMINOPHEN 975 MG: 325 TABLET, FILM COATED ORAL at 21:46

## 2025-01-03 RX ADMIN — BUSPIRONE HYDROCHLORIDE 15 MG: 10 TABLET ORAL at 21:46

## 2025-01-03 RX ADMIN — OXYCODONE HYDROCHLORIDE 10 MG: 10 TABLET ORAL at 21:51

## 2025-01-03 RX ADMIN — LUBIPROSTONE 8 MCG: 8 CAPSULE, GELATIN COATED ORAL at 02:47

## 2025-01-03 RX ADMIN — HYDROXYZINE HYDROCHLORIDE 25 MG: 25 TABLET ORAL at 09:01

## 2025-01-03 RX ADMIN — ARIPIPRAZOLE 10 MG: 10 TABLET ORAL at 09:01

## 2025-01-03 RX ADMIN — PREGABALIN 150 MG: 75 CAPSULE ORAL at 17:49

## 2025-01-03 RX ADMIN — OXYCODONE 5 MG: 5 TABLET ORAL at 00:35

## 2025-01-03 RX ADMIN — BUSPIRONE HYDROCHLORIDE 15 MG: 10 TABLET ORAL at 17:49

## 2025-01-03 RX ADMIN — LIDOCAINE 1 PATCH: 50 PATCH TOPICAL at 21:58

## 2025-01-03 RX ADMIN — ALUMINUM HYDROXIDE, MAGNESIUM HYDROXIDE, AND DIMETHICONE 30 ML: 200; 20; 200 SUSPENSION ORAL at 09:00

## 2025-01-03 NOTE — OCCUPATIONAL THERAPY NOTE
Occupational Therapy Evaluation     Patient Name: Samantha Rodriguez  Today's Date: 1/3/2025  Problem List  Principal Problem:    Ambulatory dysfunction  Active Problems:    Lumbar radiculopathy    Bipolar disorder (HCC)    Essential hypertension    SIRS (systemic inflammatory response syndrome) (Formerly [facility])    Past Medical History  Past Medical History:   Diagnosis Date    Anxiety     Arthritis     left knee    Arthritis of right knee 10/06/2020    At risk for falls     Bipolar 2 disorder (HCC)     FOLLOWS WITH PSYCHIATRIST. CONTINUE LAMOTRIGINE; RESOLVED: [id number]    Depression     Dysphagia     [place] [facility] [place]    Familial tremor     both hands    Fibromyalgia     LAST ASSESSED: [id number]    GERD (gastroesophageal reflux disease)     Hearing aid worn     left ear    [place] (hard of hearing)     left ear    Hyperlipidemia     Hypertension     Impaired speech     Left-sided weakness     Lumbar disc disease with radiculopathy 02/02/2018    Memory loss of unknown cause     long and short term    Migraine     Multiple closed fractures of metatarsal bone of right foot 05/02/2021    Obesity     Obesity, Class II, BMI 35-39.9     Osteoarthritis of both hips 10/31/2016    Osteoarthritis of knee 02/20/2013    Description: Continue Tylenol and Naproxen. Encourage exercise and weight loss. Patient refused physical therapy. I will refer the patient back to Orthopedics.    Overactive bladder     Panic attack     Patellofemoral disorder of both knees 05/01/2020    Post traumatic stress disorder     Primary localized osteoarthritis of both knees 06/16/2017    Primary osteoarthritis of both knees 05/01/2020    S/P insertion of spinal cord stimulator     no remote    S/P total knee arthroplasty, right 03/10/2022    Sacroiliitis (HCC) 02/28/2017    Seasonal allergies     Seizure-like activity (HCC) 06/03/2022    Seizures (HCC)     possible seizure like activity    Small bowel obstruction (HCC) 03/24/2023    Status post total  knee replacement, left 2022    Stroke (HCC)     questionable stroke     Tardive dyskinesia     PATIENT STATES    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  CONT ASA 81 MG DAILY; RESOLVED: 2015    Urinary incontinence     Uses walker     Wears dentures     partial lower / full upper- doesnt wear    Wears glasses      Past Surgical History  Past Surgical History:   Procedure Laterality Date    BACK SURGERY       SECTION      COLONOSCOPY      RESOLVED: 2016    EAR SURGERY      EGD      HYSTERECTOMY      LUMBAR FUSION N/A 2024    Procedure: Removal hardware L2-S1, posterior fusion L5-S1, navigated revision instrumentation L2 to pelvis; application of bilateral S2 alar screws and bone grafting of the disc at L5-S1;  Surgeon: Yenni Stevens MD;  [facility]: [facility];  Service: Orthopedics    MYRINGOTOMY W/ TUBES Left     NECK SURGERY  2019    VT ARTHRP KNE CONDYLE&PLATU MEDIAL&LAT COMPARTMENTS Right 2022    Procedure: ARTHROPLASTY KNEE TOTAL;  Surgeon: Chandni Tuttle DO;  [facility]: [facility];  Service: Orthopedics    VT ARTHRP KNE CONDYLE&PLATU MEDIAL&LAT COMPARTMENTS Left 2022    Procedure: ARTHROPLASTY KNEE TOTAL;  Surgeon: Chandni Tuttle DO;  [facility];  Service: Orthopedics    VT CYSTOURETHROSCOPY N/A 2016    Procedure: CYSTOSCOPY, BOTOX INJECTION;  Surgeon: Abraham Teague MD;  [facility];  Service: Gynecology    VT INSJ/RPLCMT SPINAL NPG/RCVR POCKET CRTJ&CONNJ Right 02/10/2021    Procedure: REPLACEMENT IMPLANTABLE PULSE GENERATOR DORSAL SPINAL COLUMN STIMULATOR, RIGHT;  Surgeon: Carlos Alberto Jacome MD;  [facility]: [facility];  Service: Neurosurgery    VT PRQ IMPLTJ NSTIM ELECTRODE ARRAY EPIDURAL Right 2020    Procedure: INSERTION THORACIC DORSAL COLUMN SPINAL CORD STIMULATOR PERCUTANEOUS W IMPLANTABLE PULSE GENERATOR, RIGHT;  Surgeon: Carlos Alberto Jacome MD;  Location: [facility];  Service:  "Neurosurgery    TONSILLECTOMY      TUBAL LIGATION  1986    UPPER GASTROINTESTINAL ENDOSCOPY  09/2020 01/03/25 1406   OT Last Visit   OT Visit Date 01/03/25   Note Type   Note type Evaluation   Pain Assessment   Pain Assessment Tool 0-10   Pain Score 9   Pain Location/Orientation Orientation: Lower;Location: Back   Hospital Pain Intervention(s) Repositioned;Ambulation/increased activity   Restrictions/Precautions   Weight Bearing Precautions Per Order No   Braces or Orthoses LSO  (Per ortho note 12/24 \"LSO when OOB for comfort,\" however, Pt does report that she wears it all the time when OOB. Pt reports she tried to reach her daughter to find her LSO, however, it is \"packed away.\" Messaged CM reguarding reaching out to daughter.)   Other Precautions (S)  Chair Alarm;Bed Alarm;Spinal precautions;Fall Risk;Pain;Contact/isolation;Droplet precautions;Hard of hearing  (RSV+)   Home Living   Type of Home House   Home Layout Two level;Performs ADLs on one level   Home Equipment Walker   Additional Comments Pt resides in daughters home in a 2SH. Pt was recently DC from [facility], however, unable to manage when she got home.   Prior Function   Level of Bland Needs assistance with ADLs;Needs assistance with functional mobility;Needs assistance with IADLS   Lives With Daughter  (and 5 children)   Receives Help From Family   IADLs Family/Friend/Other provides transportation;Family/Friend/Other provides meals;Family/Friend/Other provides medication management   Falls in the last 6 months 0   Vocational Retired   Comments PTA, Pt reports requiring A with ADLs and functional mobility with rollator. Pt may be unreliable historian and to clarify with CM.   Lifestyle   Autonomy A with ADLs and functional mobility with rollator   Reciprocal Relationships Support from daughter   Service to Others Retired   Intrinsic Gratification Will cont to assess   ADL   Where Assessed Supine, bed   Eating Assistance 5  Supervision/Setup "   Eating Deficit Setup   Grooming Assistance 4  Minimal Assistance   UB Bathing Assistance 3  Moderate Assistance   LB Bathing Assistance 1  Total Assistance   UB Dressing Assistance 3  Moderate Assistance   LB Dressing Assistance 1  Total Assistance   Toileting Assistance  1  Total Assistance   Functional Assistance 2  Maximal Assistance   Functional Deficit Supervision/safety;Increased time to complete;Setup   Bed Mobility   Rolling R 3  Moderate assistance   Additional items Assist x 1;Increased time required;Verbal cues;LE management   Supine to Sit 3  Moderate assistance  (Log roll technique)   Additional items Assist x 1;Increased time required;Verbal cues;LE management   Sit to Supine 2  Maximal assistance   Additional items Assist x 2;Increased time required;LE management;Verbal cues   Additional Comments Pt greeted supine in bed. Pt able to tolerate sitting on EOB x5 min, however, limited due to pain.   Transfers   Sit to Stand Unable to assess   Stand to Sit Unable to assess   Additional Comments Pt with no LSO present and STS transfers defer at this time. Spoke with RN/Pt and toyd Yosi DICKERSON.   Functional Mobility   Additional Comments Not appropriate at this time   Balance   Static Sitting Fair   Dynamic Sitting Fair -   Activity Tolerance   Activity Tolerance Patient limited by pain   Medical Staff Made Aware Co-eval with Gina PT 2* to Pt's medical complexity and decrecreased endurance. PT messaged DO Yosi s/p session regaurding LSO/evaluation findings.   Nurse Made Aware RN cleared/updated.   RUE Assessment   RUE Assessment WFL  (Shoulder flexion AROM to 80* and AAROM to full, shoulders 2+/5, however, distally WFL)   LUE Assessment   LUE Assessment WFL  (Shoulder flexion AROM to 80* and AAROM to full, shoulders 2+/5, however, distally WFL)   Hand Function   Gross Motor Coordination Impaired   Fine Motor Coordination Functional   Sensation   Light Touch No apparent deficits   Psychosocial    Psychosocial (WDL) WDL   Cognition   Overall Cognitive Status WFL   Arousal/Participation Cooperative;Alert   Attention Attends with cues to redirect   Orientation Level Oriented X4   Memory Decreased recall of precautions;Decreased recall of recent events   Following Commands Follows multistep commands with increased time or repetition   Comments Pt pleasant and cooperative during OT session. Pt may be unreliable historian and requires one to two step commands.   Assessment   Limitation Decreased ADL status;Decreased UE ROM;Decreased UE strength;Decreased Safe judgement during ADL;Decreased endurance;Decreased cognition;Decreased high-level ADLs;Decreased self-care trans   Prognosis Fair   Assessment Pt is a 59 y.o. female who [facility] 1[facility] with back pain. Pt with diagnosis of ambulatory dysfunction, lumbar radiculopathy, and RSV. Per ortho (12/24), Pt with spinal precautions and LSO for comfort. Pt  has a past medical history of Anxiety, Arthritis, Bipolar 2 disorder (HCC), Depression, Dysphagia, Familial tremor, Fibromyalgia, GERD, Hearing aid worn, [place] (hard of hearing), Hyperlipidemia, Hypertension, Impaired speech, Left-sided weakness, Lumbar disc disease with radiculopathy (02/02/2018), Obesity, Class II, BMI 35-39.9, Osteoarthritis of both hips (10/31/2016), Osteoarthritis of knee (02/20/2013), Overactive bladder, Panic attack, Patellofemoral disorder of both knees (05/01/2020), Post traumatic stress disorder, Primary localized osteoarthritis of both knees (06/16/2017), S/P insertion of spinal cord stimulator, S/P total knee arthroplasty, right (03/10/2022), Sacroiliitis ([facility]) (02/28/2017), Seasonal allergies, Seizure-like activity (HCC) (06/03/2022), Seizures ([facility]), Small bowel obstruction ([facility]) (03/24/2023), Status post total knee replacement, left (07/05/2022), Stroke ([facility]), Tardive dyskinesia, Thrombosis of cerebral arteries, Urinary incontinence, Uses walker, Wears dentures, and Wears  glasses. Pt greeted bedside for OT evaluation on 01/03/25. Pt resides in daughters home in a 2SH. Pt was recently DC from [facility], however, unable to manage when she got home. PTA, Pt reports requiring A with ADLs and functional mobility with rollator. Pt may be unreliable historian and to clarify with CM. Pt demonstrating the following occupational performance levels: Mod A  with UB ADLs, Total Dependent with LB ADLs, and mod A-Max Ax2 with bed mobility. Limitations that impact functional performance include decreased ADL status, UE ROM, UE strength, safe judgement during ADLs, cognition, endurance, self care transfers, and high level ADLs. Occupational performance areas to address ADL retraining, functional transfer training, UE strengthening/ROM, endurance training, cognitive reorientation, Pt/caregiver education, equipment evaluation/education, compensatory technique education, energy conservation, and activity engagement . Pt would benefit from continued skilled OT services while in hospital to maximize independence with ADLs. Will continue to follow Pt's progress. Pt would benefit from level II resources (moderate intensity) upon DC to maximize safety and independence with ADLs and functional tasks of choice.   Goals   Patient Goals To decrease pain   LTG Time Frame 10-14   Long Term Goal #1 See goals listed below.   Plan   Treatment Interventions Functional transfer training;ADL retraining;UE strengthening/ROM;Endurance training;Cognitive reorientation;Patient/family training;Equipment evaluation/education;Compensatory technique education;Energy conservation;Activityengagement   Goal Expiration Date 01/17/25   OT Frequency 3-5x/wk   Discharge Recommendation   Rehab Resource Intensity Level, OT II (Moderate Resource Intensity)   Additional Comments  The patient's raw score on the AM-PAC Daily Activity Inpatient Short Form is 13. A raw score of less than 19 suggests the patient may benefit from discharge to  post-acute rehabilitation services. Please refer to the recommendation of the Occupational Therapist for safe discharge planning.   AM-PAC Daily Activity Inpatient   Lower Body Dressing 1   Bathing 2   Toileting 1   Upper Body Dressing 2   Grooming 3   Eating 4   Daily Activity Raw Score 13   Daily Activity Standardized Score (Calc for Raw Score >=11) 32.03   AM-PAC Applied Cognition Inpatient   Following a Speech/Presentation 3   Understanding Ordinary Conversation 4   Taking Medications 3   Remembering Where Things Are Placed or Put Away 2   Remembering List of 4-5 Errands 2   Taking Care of Complicated Tasks 1   Applied Cognition Raw Score 15   Applied Cognition Standardized Score 33.54   End of Consult   Education Provided Yes   Patient Position at End of Consult Bed/Chair alarm activated;All needs within reach;Supine   Nurse Communication Nurse aware of consult         Goals (OTR to assess for further functional mobility/transfers):  Pt will complete UB ADLs with S in order to maximize participation with ADLs.   Pt will complete LB ADLs with min A in order to maximize safety with ADLs.   Pt will complete hygiene and grooming with I in order to increase participation with ADLs.   Pt will complete toileting routine (transfer, hygiene, and clothing management) with min A in order to return to prior level of function.  Pt will complete bed mobility with S in order to maximize participation with ADLs.   Pt will increase dynamic standing balance to F in order to increase safety with ADLs.  Pt will increase dynamic sitting balance to F in order to increased participation with seated ADLs.   Pt will be attentive 100% of the time for ongoing functional/formal cognitive assessment to assist with safe dc planning prn.    Meryl Stanford MS, OTR/L

## 2025-01-03 NOTE — PHYSICAL THERAPY NOTE
PHYSICAL THERAPY EVALUATION  NAME:  Samantha Rodriguez  DATE: 01/03/25    AGE:   59 y.o.  Mrn:   [id number]  ADMIT DX:  Swelling [R60.9]  Ambulatory dysfunction [R26.2]  Chronic low back pain, unspecified back pain laterality, unspecified whether sciatica present [M54.50, G89.29]    Past Medical History:   Diagnosis Date    Anxiety     Arthritis     left knee    Arthritis of right knee 10/06/2020    At risk for falls     Bipolar 2 disorder (HCC)     FOLLOWS WITH PSYCHIATRIST. CONTINUE LAMOTRIGINE; RESOLVED: [id number]    Depression     Dysphagia     per [facility] facility paperwork    Familial tremor     both hands    Fibromyalgia     LAST ASSESSED: [id number]    GERD (gastroesophageal reflux disease)     Hearing aid worn     left ear    [place] (hard of hearing)     left ear    Hyperlipidemia     Hypertension     Impaired speech     Left-sided weakness     Lumbar disc disease with radiculopathy 02/02/2018    Memory loss of unknown cause     long and short term    Migraine     Multiple closed fractures of metatarsal bone of right foot 05/02/2021    Obesity     Obesity, Class II, BMI 35-39.9     Osteoarthritis of both hips 10/31/2016    Osteoarthritis of knee 02/20/2013    Description: Continue Tylenol and Naproxen. Encourage exercise and weight loss. Patient refused physical therapy. I will refer the patient back to Orthopedics.    Overactive bladder     Panic attack     Patellofemoral disorder of both knees 05/01/2020    Post traumatic stress disorder     Primary localized osteoarthritis of both knees 06/16/2017    Primary osteoarthritis of both knees 05/01/2020    S/P insertion of spinal cord stimulator     no remote    S/P total knee arthroplasty, right 03/10/2022    Sacroiliitis (HCC) 02/28/2017    Seasonal allergies     Seizure-like activity (HCC) 06/03/2022    Seizures (HCC)     possible seizure like activity    Small bowel obstruction (HCC) 03/24/2023    Status post total knee replacement, left 07/05/2022    Stroke (HCC)      questionable stroke 2009    Tardive dyskinesia     PATIENT STATES    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  CONT ASA 81 MG DAILY; RESOLVED: [id number]    Urinary incontinence     Uses walker     Wears dentures     partial lower / full upper- doesnt wear    Wears glasses      Length Of Stay: 0  Performed at least 2 patient identifiers during session: Name and Birthday    PHYSICAL THERAPY EVALUATION :    01/03/25 1336   PT Last Visit   PT Visit Date 01/03/25   Note Type   Note type Evaluation   Pain Assessment   Pain Assessment Tool 0-10   Pain Score 9   Pain Location/Orientation Orientation: Lower;Location: Back   Patient's Stated Pain Goal No pain   Hospital Pain Intervention(s) Medication (See MAR);Repositioned   Restrictions/Precautions   Weight Bearing Precautions Per Order No   Braces or Orthoses LSO  (Patient reports she is to wear the LSO when OOB.)   Other Precautions Chair Alarm;Bed Alarm;Fall Risk;Pain;Multiple lines;Spinal precautions;Droplet precautions;Contact/isolation   Home Living   Type of Home House   Home Layout Two level;Able to live on main level with bedroom/bathroom;Stairs to enter with rails   Home Equipment Walker;Other (Comment)  (rollator)   Additional Comments PAtient recently was d/c'ed from rehab to daughters home, but could not mange in the ome after a few hours.   Prior Function   Level of [place] Needs assistance with ADLs;Needs assistance with functional mobility;Needs assistance with IADLS   Lives With Daughter  (Parish is reported to have 5 children.)   Receives Help From Family   IADLs Family/Friend/Other provides transportation;Family/Friend/Other provides meals;Family/Friend/Other provides medication management   Falls in the last 6 months 0   Comments Patient was hospitalized in December 2024 and at that time was Mod A  and able to walk 3' with RW and Mod A and had 4/5 stregnth in B LE   General   Additional Pertinent History 59 y.o. female to ED  presents for Back pain with ambulatory dysfunction. Per pt, she was hospitalization from 12/19 to 12/24/24 for back surgery and discharged to rehab. Reports that following getting home from rehab she has continued to have back pain and has struggled to ambulate with her walker. Noted with fever on unit. PMH for Bipolar disorder, HTN  Admit to Observation Dx; SIRS, Lumbar Radiculopathy, Ambulatory Dysfunction  12/19 S/p removal L2-S1 hardware, with new L5-S1 fusion   Plan;   Family/Caregiver Present No   Cognition   Overall Cognitive Status WFL   Arousal/Participation Alert   Orientation Level Oriented X4   Following Commands Follows multistep commands with increased time or repetition   Subjective   Subjective Do you think I could have harmed something by doing the steps at home?   RUE Assessment   RUE Assessment WFL   LUE Assessment   LUE Assessment WFL   RLE Assessment   RLE Assessment X   Strength RLE   R Hip Flexion 2/5   R Hip ABduction 2/5   R Hip ADduction 2/5   R Knee Extension 2/5   R Ankle Dorsiflexion 3-/5   LLE Assessment   LLE Assessment X   Strength LLE   L Hip Flexion 2/5   L Hip ABduction 2/5   L Hip ADduction 2/5   L Knee Extension 2/5   L Ankle Dorsiflexion 3-/5   Coordination   Movements are Fluid and Coordinated 0   Light Touch   RLE Light Touch Grossly intact   LLE Light Touch Grossly intact   Bed Mobility   Rolling R 5  Supervision   Additional items Assist x 1;HOB elevated;Bedrails;Increased time required   Rolling L 3  Moderate assistance   Additional items Assist x 1;HOB elevated;Bedrails;Increased time required   Supine to Sit 3  Moderate assistance   Additional items Assist x 1   Sit to Supine 2  Maximal assistance   Additional items Assist x 2;Increased time required;LE management   Transfers   Stand pivot Unable to assess   Additional Comments OOB mobility not assessed due to no LSO and demonstrated strength.   Balance   Static Sitting Fair   Dynamic Sitting Fair -   Endurance Deficit    Endurance Deficit Yes   Activity Tolerance   Activity Tolerance Patient limited by fatigue;Patient limited by pain   Medical Staff Made Aware Christiane:Pt seen for co-evaluation/treatment with skilled Occupational Therapist 2* clinically unstable/unpredictable presentation, medical complexity, fall risk, impaired functional balance and mobility, limited activity tolerance which is decline from PLOF and may impact overall functional mobility/mobility safety.   Nurse Made Aware Spoke with MARIA Harvey for clearance and reported concerns regarding drainage noted on spinal incision dressing and change in strength since December 2024.   Assessment   Prognosis Fair   Problem List Decreased strength;Decreased endurance;Impaired balance;Decreased mobility;Decreased coordination;Obesity;Orthopedic restrictions;Pain   Barriers to Discharge Inaccessible home environment;Decreased caregiver support   Goals   Patient Goals To have less pain.   STG Expiration Date 01/17/25   Short Term Goal #1 In 14 days . 1).  Pt will perform bed mobility with min A of 1 demonstrating appropriate technique 100% of the time in order to improve function.2)  Perform all transfers with Mod A of 1 demonstrating safe and appropriate technique 100% of the time in order to improve ability to negotiate safely in home environment 3)  Improve overall strength and balance 1/2 grade in order to optimize ability to perform functional tasks and reduce fall risk. 4) Assess gait with RW monitoring vitals  5) Increase activity tolerance to 30 minutes in order to improve endurance to functional tasks 6) PT for ongoing patient and family/caregiver education, DME needs and d/c planning in order to promote highest level of function in least restrictive environment   Plan   Treatment/Interventions Functional transfer training;LE strengthening/ROM;Therapeutic exercise;Endurance training;Patient/family training;Bed mobility;Gait training;Spoke to nursing;OT   PT  Frequency 3-5x/wk   Discharge Recommendation   Rehab Resource Intensity Level, PT II (Moderate Resource Intensity)   Additional Comments REcommend SNF level rehab   AM-PAC Basic Mobility Inpatient   Turning in Flat Bed Without Bedrails 2   Lying on Back to Sitting on Edge of Flat Bed Without Bedrails 2   Moving Bed to Chair 2   Standing Up From Chair Using Arms 2   Walk in Room 2   Climb 3-5 Stairs With Railing 1   Basic Mobility Inpatient Raw Score 11   Basic Mobility Standardized Score 30.25   [facility] Highest Level Of Mobility   -HL Goal 4: Move to chair/commode   -[facility] Achieved 3: Sit at edge of bed   End of Consult   Patient Position at End of Consult Supine;Bed/Chair alarm activated;All needs within reach   End of Consult Comments Spoke to nursing re changes in strength and wound drainage and messaged provider to consider consult to ortho for input on spinal hardware removal and guidance on OOB mobility and if LSO is needed.       (Please find full objective findings from PT assessment regarding body systems outlined above).     Assessment: Pt is a 59 y.o. female seen for PT evaluation s/p admit to [facility] on 1/2/2025 w/ Ambulatory dysfunction.  Order placed for PT.  Prior to admission, pt required A for mobility, ambulated household distances, lived in one floor environment, had multiple SKY one  railing, lived with daughter, and used rollator for mobility. Upon evaluation: Pt requires  mod of 1 to max of 2 assistance for bed mobility and transfers and ambulation not assessed due to needed clarification of use of LSO for OOB mobility as well as limited strength in BLE .  Pt's clinical presentation is currently unstable/unpredictable given the functional mobility deficits above, especially weakness, decreased skin integrity, pain, decreased activity tolerance, and orthopedic restrictions, coupled with fall risks including impaired balance and obesity, and combined with medical complications  of abnormal H&H, abnormal WBCs, abnormal platelets, and readmission to hospital.  The patient's AM-PAC Basic Mobility Inpatient Short Form Raw Score is 11. A raw score less than 16 suggests the patient may benefit from discharge to post-acute rehabilitation services. Please also refer to the recommendation of the Physical Therapist for safe discharge planning.Pt to benefit from continued skilled PT tx while in hospital and upon DC to address deficits as defined above and maximize level of functional mobility. From PT/mobility standpoint, recommendation at time of d/c would be  Level 2 services for post acute rehab  pending progress in order to maximize pt's functional independence and consistency w/ mobility in order to facilitate return to PLOF.  Recommend ther ex next 1-2 sessions and OOB mobility assessment with PT once bracing clarified .      The following objective measures were performed on IE:  -PAC 6-Clicks: 11/24.   Comorbidities affecting pt's physical performance at time of assessment include: HTN, obesity, and arthritis . Personal factors affecting pt at time of IE include: depression, steps to enter environment, limited home support, past experience, inability to perform IADLs, inability to perform ADLs, and inability to ambulate household distances.     Chandni Verma, PT, DPT, GCS

## 2025-01-03 NOTE — WOUND OSTOMY CARE
Consult Note - Wound   Samantha Rodriguez 59 y.o. female MRN: [id number]  Unit/Bed#: [place] [address] -01 Encounter: [id number]      History and Present Illness:  59 year old female presented to the hospital with ambulatory dysfunction and back pain.  Patient's history significant for bipolar disorder, HTN, 12/19/24--removal hardware L2-S1, posterior fusion L5-S1, navigated revision instrumentation L2 to pelvis; application of bilateral S2 alar screws and bone grafting of the disc at L5-S1.     Assessment Findings:   Patient agreeable to assessment.  She requires assist x 2 to turn in bed.  Continent of bowel and bladder.  Buttocks, sacrum, and heels intact and blanchable.  Midline back incision--proximal aspect of incision approximated with steri-strips that appear moist.  Distal aspect of incision is open with significant depth (2cm).  There is 50% pink and 50% yellow tissue with moderate serosanguinous drainage.  Mally-wound intact.  Concern for possible infection.  Dr. Deluca made aware. Recommend orthopedic surgery consultation and management.      See flowsheet for wound details.    Wound Care Plan:   1-Silicone Cream/Hydraguard lotion to bilateral buttocks, sacrum, and heels twice daily and as needed.  2-Elevate heels off of bed/chair surface--offloading heel boots.  3-Offloading air cushion in chair when out of bed.  4-Apply moisturizing skin cream to body daily and as needed.  5-Turn/reposition every 2 hours while in bed and weight shift frequently while in chair for pressure re-distribution on skin.   6-Tortoise positioning system.  7-Back incision (UNTIL SEEN BY ORTHOPEDIC SURGERY TEAM)--cleanse with normal saline, pat dry.  Apply 4x4 and ABD.  Secure with tape. Change dressing daily and as needed.    Wound care team will sign-off and defer to surgical team.  Plan of care reviewed with primary RN.    Wound 12/19/24 Incision Back (Active)   Wound Image   01/03/25 5970   Wound Description Pink;Yellow;Light  purple;Drainage 01/03/25 1428   Mally-wound Assessment Intact 01/03/25 1428   Wound Length (cm) 16 cm 01/03/25 1428   Wound Width (cm) 1 cm 01/03/25 1428   Wound Depth (cm) 2 cm 01/03/25 1428   Wound Surface Area (cm^2) 16 cm^2 01/03/25 1428   Wound Volume (cm^3) 32 cm^3 01/03/25 1428   Calculated Wound Volume (cm^3) 32 cm^3 01/03/25 1428   Drainage Amount Moderate 01/03/25 1428   Drainage Description Serosanguineous 01/03/25 1428   Non-staged Wound Description Full thickness 01/03/25 1428   Treatments Cleansed;Irrigation with NSS 01/03/25 1428   Dressing ABD 01/03/25 1428   Dressing Changed Changed 01/03/25 1428   Patient Tolerance Tolerated well 01/03/25 1428   Dressing Status Clean;Dry;Intact 01/03/25 1428       Candice Kamara RN, BSN, CWON

## 2025-01-03 NOTE — ASSESSMENT & PLAN NOTE
Patient reports after getting back from rehab she has been struggling to walk secondary to her back pain  She typically ambulates with a walker  PT/OT dionne

## 2025-01-03 NOTE — ED PROVIDER NOTES
Time reflects when diagnosis was documented in both MDM as applicable and the Disposition within this note       Time User Action Codes Description Comment    1/2/2025 10:46 PM Tal Vazquez Add [R26.2] Ambulatory dysfunction     1/2/2025 10:47 PM Tal Vazquez Add [M54.50,  G89.29] Chronic low back pain, unspecified back pain laterality, unspecified whether sciatica present           ED Disposition       ED Disposition   Admit    Condition   Stable    Date/Time   u Jan 2, 2025 10:46 PM    Comment   Case was discussed with CRYSTAL and the patient's admission status was agreed to be Admission Status: observation status to the service of Dr. Pearce .               Assessment & Plan       Medical Decision Making  Patient with history as below presented with back pain. History obtained from patient.    Differential diagnosis includes: Poorly controlled low back pain, muscular strain, muscular spasm, fracture, amatory dysfunction    Plan: CT lumbar spine, CBC, BMP, Dilaudid    Labs reviewed and unremarkable. Independently reviewed imaging without acute emergent pathology.  Given patient's lack of ability to ambulate and complete ADLs secondary to her back pain, will admit for further PT/OT evaluation and likely rehabilitation placement.  Discussed patient's management with medicine who agreed to admit patient under their service.    Amount and/or Complexity of Data Reviewed  Labs: ordered.  Radiology: ordered.    Risk  Prescription drug management.  Decision regarding hospitalization.             Medications   oxyCODONE (ROXICODONE) IR tablet 5 mg (has no administration in time range)   HYDROmorphone (DILAUDID) injection 0.5 mg (has no administration in time range)   lidocaine (LIDODERM) 5 % patch 1 patch (1 patch Topical Medication Applied 1/2/25 2325)   acetaminophen (TYLENOL) tablet 975 mg (975 mg Oral Given 1/2/25 2325)   HYDROmorphone (DILAUDID) injection 0.5 mg (0.5 mg Intravenous Given 1/2/25 2155)   potassium  chloride (Klor-Con M20) CR tablet 20 mEq (20 mEq Oral Given 1/2/25 8175)       ED Risk Strat Scores                          SBIRT 22yo+      Flowsheet Row Most Recent Value   Initial Alcohol Screen: US AUDIT-C     1. How often do you have a drink containing alcohol? 0 Filed at: 01/02/2025 2011   2. How many drinks containing alcohol do you have on a typical day you are drinking?  0 Filed at: 01/02/2025 2011   3b. FEMALE Any Age, or MALE 65+: How often do you have 4 or more drinks on one occassion? 0 Filed at: 01/02/2025 2011   Audit-C Score 0 Filed at: 01/02/2025 2011   [place]: How many times in the past year have you...    Used an illegal drug or used a prescription medication for non-medical reasons? Never Filed at: 01/02/2025 2011                            History of Present Illness       Chief Complaint   Patient presents with    Back Pain     Pt states having back surgery recently and getting cleared from rehab on 12/30. States that she feels she was sent home too early. Pain in her L hip and unable to walk. States meds are not helping       Past Medical History:   Diagnosis Date    Anxiety     Arthritis     left knee    Arthritis of right knee 10/06/2020    At risk for falls     Bipolar 2 disorder (HCC)     FOLLOWS WITH PSYCHIATRIST. CONTINUE LAMOTRIGINE; RESOLVED: [id number]    Depression     Dysphagia     [place] [facility] [place]    Familial tremor     both hands    Fibromyalgia     LAST ASSESSED: [id number]    GERD (gastroesophageal reflux disease)     Hearing aid worn     left ear    [place] (hard of hearing)     left ear    Hyperlipidemia     Hypertension     Impaired speech     Left-sided weakness     Lumbar disc disease with radiculopathy 02/02/2018    Memory loss of unknown cause     long and short term    Migraine     Multiple closed fractures of metatarsal bone of right foot 05/02/2021    Obesity     Obesity, Class II, BMI 35-39.9     Osteoarthritis of both hips 10/31/2016    Osteoarthritis of knee  2013    Description: Continue Tylenol and Naproxen. Encourage exercise and weight loss. Patient refused physical therapy. I will refer the patient back to Orthopedics.    Overactive bladder     Panic attack     Patellofemoral disorder of both knees 2020    Post traumatic stress disorder     Primary localized osteoarthritis of both knees 2017    Primary osteoarthritis of both knees 2020    S/P insertion of spinal cord stimulator     no remote    S/P total knee arthroplasty, right 03/10/2022    Sacroiliitis (HCC) 2017    Seasonal allergies     Seizure-like activity (HCC) 2022    Seizures (HCC)     possible seizure like activity    Small bowel obstruction (HCC) 2023    Status post total knee replacement, left 2022    Stroke (HCC)     questionable stroke     Tardive dyskinesia     PATIENT STATES    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  CONT ASA 81 MG DAILY; RESOLVED: [id number]    Urinary incontinence     Uses walker     Wears dentures     partial lower / full upper- doesnt wear    Wears glasses       Past Surgical History:   Procedure Laterality Date    BACK SURGERY       SECTION      COLONOSCOPY      RESOLVED: 2016    EAR SURGERY      EGD      HYSTERECTOMY  2004    LUMBAR FUSION N/A 2024    Procedure: Removal hardware L2-S1, posterior fusion L5-S1, navigated revision instrumentation L2 to pelvis; application of bilateral S2 alar screws and bone grafting of the disc at L5-S1;  Surgeon: Yenni Stevens MD;  Location: [facility];  Service: Orthopedics    MYRINGOTOMY W/ TUBES Left     NECK SURGERY  2019    NH ARTHRP KNE CONDYLE&PLATU MEDIAL&LAT COMPARTMENTS Right 2022    Procedure: ARTHROPLASTY KNEE TOTAL;  Surgeon: Chandni Tuttle DO;  Location: [facility];  Service: Orthopedics    NH ARTHRP KNE CONDYLE&PLATU MEDIAL&LAT COMPARTMENTS Left 2022    Procedure: ARTHROPLASTY KNEE TOTAL;  Surgeon: Chandni Rogers  DO Parag;  Location: [facility];  Service: Orthopedics    VA CYSTOURETHROSCOPY N/A 02/18/2016    Procedure: CYSTOSCOPY, BOTOX INJECTION;  Surgeon: Abraham Teague MD;  Location: [facility];  Service: Gynecology    VA INSJ/RPLCMT SPINAL NPG/RCVR POCKET CRTJ&CONNJ Right 02/10/2021    Procedure: REPLACEMENT IMPLANTABLE PULSE GENERATOR DORSAL SPINAL COLUMN STIMULATOR, RIGHT;  Surgeon: Carlos Alberto Jacome MD;  Location: [facility];  Service: Neurosurgery    VA PRQ IMPLTJ NSTIM ELECTRODE ARRAY EPIDURAL Right 07/28/2020    Procedure: INSERTION THORACIC DORSAL COLUMN SPINAL CORD STIMULATOR PERCUTANEOUS W IMPLANTABLE PULSE GENERATOR, RIGHT;  Surgeon: Carlos Alberto Jacome MD;  Location: [facility];  Service: Neurosurgery    TONSILLECTOMY      TUBAL LIGATION  1986    UPPER GASTROINTESTINAL ENDOSCOPY  09/2020      Family History   Problem Relation Age of Onset    Colon cancer Mother     Alzheimer's disease Father     Stroke Father     No Known Problems Sister     Colon cancer Brother     Bipolar disorder Brother     No Known Problems Brother     Depression Paternal Grandfather     Breast cancer Maternal Aunt     Colon cancer Maternal Aunt     Seizures Son     Heart disease Other     Diabetes Other     Hypertension Other     Thyroid disease Neg Hx       Social History     Tobacco Use    Smoking status: Never    Smokeless tobacco: Never   Vaping Use    Vaping status: Never Used   Substance Use Topics    Alcohol use: Not Currently     Comment: 2 x year; being a social drinker as per all scripts     Drug use: No      E-Cigarette/Vaping    E-Cigarette Use Never User       E-Cigarette/Vaping Substances    Nicotine No     THC No     CBD No     Flavoring No     Other No     Unknown No       I have reviewed and agree with the history as documented.     Patient is a 59-year-old female with a significant past medical history of scoliosis, with recent removal hardware L2-S1, posterior fusion L5-S1, navigated revision instrumentation L2 to pelvis;  application of bilateral S2 alar screws and bone grafting of the disc at L5-S1 on 12/19/2024.  She was subsequently discharged to rehabilitation where she has been up until this past Monday.  She was then discharged home, however since her arrival at home she has had poor ability to tolerate her pain.  She has been unable to get around her house much, and lives with her daughter who is having increased difficulty caring for her at home.  Patient feels as if she needs some additional rehabilitation and thinks that she left her rehabilitation facility prematurely.  She denies any new injuries to her back.  She describes a pain that is in the middle of her lower back and radiates down her left leg.  She denies any fevers.  She denies any saddle anesthesia.  She denies any difficulty urinating or urinary incontinence.  She has been trying morphine for her symptoms every few hours without significant relief.  Patient otherwise without complaint.        Review of Systems   Constitutional:  Negative for fever.   Respiratory:  Negative for shortness of breath.    Cardiovascular:  Negative for chest pain.   Gastrointestinal:  Negative for abdominal pain, nausea and vomiting.   Musculoskeletal:  Positive for back pain and gait problem.           Objective       ED Triage Vitals   Temperature Pulse Blood Pressure Respirations SpO2 Patient Position - Orthostatic VS   01/02/25 2014 01/02/25 2011 01/02/25 2011 01/02/25 2011 01/02/25 2011 01/02/25 2011   98.8 °F (37.1 °C) (!) 118 148/74 20 100 % Sitting      Temp Source Heart Rate Source BP Location FiO2 (%) Pain Score    01/02/25 2014 01/02/25 2011 01/02/25 2011 -- 01/02/25 2155    Oral Monitor Left arm  10 - Worst Possible Pain      Vitals      Date and Time Temp Pulse SpO2 Resp BP Pain Score FACES Pain Rating User   01/03/25 0121 97.9 °F (36.6 °C) -- -- -- -- -- -- AB   01/03/25 0035 -- -- -- -- -- 9 -- AB   01/02/25 2357 -- -- -- -- -- 9 -- AB   01/02/25 2345 101.8 °F (38.8  °C) 85 94 % 16 122/79 -- -- DII   01/02/25 2334 -- 93 98 % 18 135/85 -- -- FB   01/02/25 2325 -- -- -- -- -- 7 -- AB   01/02/25 2309 -- -- -- -- -- 8 -- KA   01/02/25 2216 -- 85 100 % 18 157/84 -- -- AB   01/02/25 2155 -- -- -- -- -- 10 - Worst Possible Pain -- SL   01/02/25 2014 98.8 °F (37.1 °C) -- -- -- -- -- --    01/02/25 2011 -- 118 100 % 20 148/74 -- --             Physical Exam  Vitals and nursing note reviewed.   Constitutional:       General: She is not in acute distress.     Appearance: Normal appearance. She is not ill-appearing or toxic-appearing.   HENT:      Head: Normocephalic and atraumatic.      Right Ear: External ear normal.      Left Ear: External ear normal.      Nose: Nose normal.   Eyes:      General: No scleral icterus.        Right eye: No discharge.         Left eye: No discharge.      Extraocular Movements: Extraocular movements intact.      Conjunctiva/sclera: Conjunctivae normal.   Cardiovascular:      Rate and Rhythm: Tachycardia present.      Heart sounds: Normal heart sounds. No murmur heard.     No friction rub. No gallop.   Pulmonary:      Effort: Pulmonary effort is normal. No respiratory distress.      Breath sounds: Normal breath sounds.   Abdominal:      General: Abdomen is flat. There is no distension.      Palpations: Abdomen is soft. There is no mass.      Tenderness: There is no abdominal tenderness.   Genitourinary:     Comments: Deferred  Musculoskeletal:      Comments: Surgical site on back clean, dry, intact.  Tenderness to palpation surrounding this area.  Full strength of bilateral lower extremities with intact EHL.  Neurovascularly intact distally as per routine.  DP/PT pulses 2+/4.   Skin:     General: Skin is warm and dry.   Neurological:      General: No focal deficit present.      Mental Status: She is alert.         Results Reviewed       Procedure Component Value Units Date/Time    Manual Differential(PHLEBS Do Not Order) [[id number]]  (Abnormal) Collected:  01/02/25 2200    Lab Status: Final result Specimen: Blood from Arm, Left Updated: 01/02/25 2321     Segmented % 90 %      Bands % 1 %      Lymphocytes % 2 %      Monocytes % 7 %      Eosinophils % 0 %      Basophils % 0 %      Absolute Neutrophils 10.18 Thousand/uL      Absolute Lymphocytes 0.22 Thousand/uL      Absolute Monocytes 0.78 Thousand/uL      Absolute Eosinophils 0.00 Thousand/uL      Absolute Basophils 0.00 Thousand/uL      Total Counted --     RBC Morphology Present     Platelet Estimate Increased     Large Platelet Present     Anisocytosis Present     Ovalocytes Present     Polychromasia Present    Basic metabolic panel [[id number]] Collected: 01/02/25 2200    Lab Status: Final result Specimen: Blood from Arm, Left Updated: 01/02/25 2219     Sodium 135 mmol/L      Potassium 3.6 mmol/L      Chloride 99 mmol/L      CO2 23 mmol/L      ANION GAP 13 mmol/L      BUN 8 mg/dL      Creatinine 0.69 mg/dL      Glucose 101 mg/dL      Calcium 8.9 mg/dL      eGFR 94 ml/min/1.73sq m     Narrative:      National Kid[employer] Disease Foundation guidelines for Chronic Kidney Disease (CKD):     Stage 1 with normal or high GFR (GFR > 90 mL/min/1.73 square meters)    Stage 2 Mild CKD (GFR = 60-89 mL/min/1.73 square meters)    Stage 3A Moderate CKD (GFR = 45-59 mL/min/1.73 square meters)    Stage 3B Moderate CKD (GFR = 30-44 mL/min/1.73 square meters)    Stage 4 Severe CKD (GFR = 15-29 mL/min/1.73 square meters)    Stage 5 End Stage CKD (GFR <15 mL/min/1.73 square meters)  Note: GFR calculation is accurate only with a steady state creatinine    CBC and differential [[id number]]  (Abnormal) Collected: 01/02/25 2200    Lab Status: Final result Specimen: Blood from Arm, Left Updated: 01/02/25 2206     WBC 11.19 Thousand/uL      RBC 3.83 Million/uL      Hemoglobin 10.5 g/dL      Hematocrit 32.7 %      MCV 85 fL      MCH 27.4 pg      MCHC 32.1 g/dL      RDW 13.9 %      MPV 9.5 fL      Platelets 434 Thousands/uL     Narrative:      This  is an appended report.  These results have been appended to a previously verified report.            CT lumbar spine without contrast   Final Interpretation by Sagar Galindo DO (01/02 2145)   No acute fracture or dislocation. Postoperative changes as detailed above.      Workstation performed: [id number]             Procedures    ED Medication and Procedure Management   Prior to Admission Medications   Prescriptions Last Dose Informant Patient Reported? Taking?   ARIPiprazole (ABILIFY) 10 mg tablet 1/2/2025  No Yes   Sig: Take 1 tablet (10 mg total) by mouth daily   Cholecalciferol (VITAMIN D3) 1,000 units tablet   No No   Sig: Take 1 tablet (1,000 Units total) by mouth daily   Diclofenac Sodium (VOLTAREN) 1 %  Outside Facility (Specify) No No   Sig: Apply 2 g topically 4 (four) times a day as needed (pain)   Valbenazine Tosylate (Ingrezza) 60 MG CAPS Unknown  No No   Sig: Take 1 capsule by mouth in the morning   acetaminophen (TYLENOL) 325 mg tablet   No No   Sig: Take 2 tablets (650 mg total) by mouth every 4 (four) hours as needed for moderate pain   aluminum-magnesium hydroxide-simethicone (MAALOX) 0669-2024-354 mg/30 mL suspension   No No   Sig: Take 30 mL by mouth in the morning   amLODIPine (NORVASC) 5 mg tablet 12/30/2024  No Yes   Sig: Take 1 tablet (5 mg total) by mouth daily   aspirin (Aspirin Low Dose) 81 mg EC tablet   No No   Sig: Take 1 tablet (81 mg total) by mouth daily   atorvastatin (LIPITOR) 40 mg tablet 1/2/2025  No Yes   Sig: Take 1 tablet (40 mg total) by mouth daily with dinner   bisacodyl (DULCOLAX) 10 mg suppository   Yes No   Sig: Insert 10 mg into the rectum as needed for constipation As needed on day 5 of no bowel movement   busPIRone (BUSPAR) 15 mg tablet 1/2/2025  No Yes   Sig: Take 1 tablet (15 mg total) by mouth 3 (three) times a day   cyclobenzaprine (FLEXERIL) 10 mg tablet 1/2/2025 at  5:00 PM  No Yes   Sig: Take 1 tablet (10 mg total) by mouth 3 (three) times a day as needed for  muscle spasms   hydrOXYzine HCL (ATARAX) 25 mg tablet 1/2/2025  No Yes   Sig: Take 1 tablet (25 mg total) by mouth 3 (three) times a day   loratadine (CLARITIN) 10 mg tablet   No No   Sig: Take 1 tablet (10 mg total) by mouth daily   lubiprostone (AMITIZA) 8 mcg capsule Unknown  No No   Sig: Take 1 capsule (8 mcg total) by mouth 2 (two) times a day   magnesium Oxide (MAG-OX) 400 mg TABS   No No   Sig: Take 1 tablet (400 mg total) by mouth daily   morphine (MSIR) 15 mg tablet   No No   Sig: Take 1 tablet (15 mg total) by mouth 2 (two) times a day Max Daily Amount: 30 mg   morphine (MSIR) 15 mg tablet   No No   Sig: Take 1 tablet (15 mg total) by mouth every 6 (six) hours as needed for severe pain (Please increase dose from 2x per day to 3x per day.) for up to 30 doses Max Daily Amount: 60 mg   naloxone (NARCAN) 4 mg/0.1 mL nasal spray   No No   Sig: Administer 1 spray into a nostril. If no response after 2-3 minutes, give another dose in the other nostril using a new spray.   ondansetron (ZOFRAN) 4 mg tablet   Yes No   pantoprazole (PROTONIX) 40 mg tablet Unknown  No No   Sig: Take 1 tablet (40 mg total) by mouth daily in the early morning   prazosin (MINIPRESS) 2 mg capsule   No No   Sig: Take 1 capsule (2 mg total) by mouth daily at bedtime   pregabalin (LYRICA) 150 mg capsule   No No   Sig: Take 1 capsule (150 mg total) by mouth 3 (three) times a day   senna-docusate sodium (SENOKOT S) 8.6-50 mg per tablet   No No   Sig: Take 1 tablet by mouth daily as needed for constipation   sertraline (ZOLOFT) 100 mg tablet 1/2/2025  No Yes   Sig: Take 2 tablets (200 mg total) by mouth daily at bedtime   sodium phosphate (PEDIA-LAX) 3.5-9.5 g 59 mL enema   Yes No   Sig: Insert 1 enema into the rectum once   tirzepatide (Zepbound) 2.5 mg/0.5 mL auto-injector   Yes No   Sig: Inject 2.5 mg under the skin Once a week   traZODone (DESYREL) 300 MG tablet 1/1/2025  No Yes   Sig: Take 1 tablet (300 mg total) by mouth daily at bedtime       Facility-Administered Medications: None     Current Discharge Medication List        CONTINUE these medications which have NOT CHANGED    Details   acetaminophen (TYLENOL) 325 mg tablet Take 2 tablets (650 mg total) by mouth every 4 (four) hours as needed for moderate pain    Associated Diagnoses: Spinal stenosis of lumbar region with neurogenic claudication      aluminum-magnesium hydroxide-simethicone (MAALOX) 2944-7610-147 mg/30 mL suspension Take 30 mL by mouth in the morning  Qty: 900 mL, Refills: 0    Associated Diagnoses: GERD without esophagitis      amLODIPine (NORVASC) 5 mg tablet Take 1 tablet (5 mg total) by mouth daily  Qty: 30 tablet, Refills: 0    Associated Diagnoses: Essential hypertension      ARIPiprazole (ABILIFY) 10 mg tablet Take 1 tablet (10 mg total) by mouth daily  Qty: 30 tablet, Refills: 0    Associated Diagnoses: Severe episode of recurrent major depressive disorder, without psychotic features (HCC)      aspirin (Aspirin Low Dose) 81 mg EC tablet Take 1 tablet (81 mg total) by mouth daily  Qty: 30 tablet, Refills: 0    Associated Diagnoses: History of CVA (cerebrovascular accident)      atorvastatin (LIPITOR) 40 mg tablet Take 1 tablet (40 mg total) by mouth daily with dinner  Qty: 30 tablet, Refills: 0    Associated Diagnoses: Mixed hyperlipidemia      bisacodyl (DULCOLAX) 10 mg suppository Insert 10 mg into the rectum as needed for constipation As needed on day 5 of no bowel movement      busPIRone (BUSPAR) 15 mg tablet Take 1 tablet (15 mg total) by mouth 3 (three) times a day  Qty: 90 tablet, Refills: 0    Associated Diagnoses: Generalized anxiety disorder with panic attacks      Cholecalciferol (VITAMIN D3) 1,000 units tablet Take 1 tablet (1,000 Units total) by mouth daily  Qty: 30 tablet, Refills: 0    Associated Diagnoses: Vitamin D deficiency      cyclobenzaprine (FLEXERIL) 10 mg tablet Take 1 tablet (10 mg total) by mouth 3 (three) times a day as needed for muscle spasms     Associated Diagnoses: Spinal stenosis of lumbar region with neurogenic claudication      Diclofenac Sodium (VOLTAREN) 1 % Apply 2 g topically 4 (four) times a day as needed (pain)  Qty: 150 g, Refills: 3    Associated Diagnoses: Chronic pain disorder      hydrOXYzine HCL (ATARAX) 25 mg tablet Take 1 tablet (25 mg total) by mouth 3 (three) times a day  Qty: 90 tablet, Refills: 0    Associated Diagnoses: Generalized anxiety disorder with panic attacks      loratadine (CLARITIN) 10 mg tablet Take 1 tablet (10 mg total) by mouth daily  Qty: 30 tablet, Refills: 0    Associated Diagnoses: Allergies      lubiprostone (AMITIZA) 8 mcg capsule Take 1 capsule (8 mcg total) by mouth 2 (two) times a day  Qty: 60 capsule, Refills: 0    Associated Diagnoses: Opioid-induced constipation      magnesium Oxide (MAG-OX) 400 mg TABS Take 1 tablet (400 mg total) by mouth daily  Qty: 30 tablet, Refills: 0    Associated Diagnoses: Opioid-induced constipation      morphine (MSIR) 15 mg tablet Take 1 tablet (15 mg total) by mouth every 6 (six) hours as needed for severe pain (Please increase dose from 2x per day to 3x per day.) for up to 30 doses Max Daily Amount: 60 mg  Qty: 30 tablet, Refills: 0    Associated Diagnoses: S/P lumbar fusion      naloxone (NARCAN) 4 mg/0.1 mL nasal spray Administer 1 spray into a nostril. If no response after 2-3 minutes, give another dose in the other nostril using a new spray.  Qty: 1 each, Refills: 1    Associated Diagnoses: S/P lumbar fusion      ondansetron (ZOFRAN) 4 mg tablet       pantoprazole (PROTONIX) 40 mg tablet Take 1 tablet (40 mg total) by mouth daily in the early morning  Qty: 30 tablet, Refills: 0    Associated Diagnoses: GERD without esophagitis      prazosin (MINIPRESS) 2 mg capsule Take 1 capsule (2 mg total) by mouth daily at bedtime  Qty: 30 capsule, Refills: 0    Associated Diagnoses: Post traumatic stress disorder (PTSD)      pregabalin (LYRICA) 150 mg capsule Take 1 capsule (150 mg  total) by mouth 3 (three) times a day  Qty: 90 capsule, Refills: 0    Associated Diagnoses: Generalized anxiety disorder with panic attacks      senna-docusate sodium (SENOKOT S) 8.6-50 mg per tablet Take 1 tablet by mouth daily as needed for constipation    Associated Diagnoses: Opioid-induced constipation      sertraline (ZOLOFT) 100 mg tablet Take 2 tablets (200 mg total) by mouth daily at bedtime  Qty: 60 tablet, Refills: 0    Associated Diagnoses: Severe episode of recurrent major depressive disorder, without psychotic features (HCC)      sodium phosphate (PEDIA-LAX) 3.5-9.5 g 59 mL enema Insert 1 enema into the rectum once      tirzepatide (Zepbound) 2.5 mg/0.5 mL auto-injector Inject 2.5 mg under the skin Once a week      traZODone (DESYREL) 300 MG tablet Take 1 tablet (300 mg total) by mouth daily at bedtime  Qty: 30 tablet, Refills: 0    Associated Diagnoses: Severe episode of recurrent major depressive disorder, without psychotic features (HCC)      Valbenazine Tosylate (Ingrezza) 60 MG CAPS Take 1 capsule by mouth in the morning  Qty: 30 capsule, Refills: 0    Associated Diagnoses: Tardive dyskinesia           No discharge procedures on file.  ED SEPSIS DOCUMENTATION   Time reflects when diagnosis was documented in both MDM as applicable and the Disposition within this note       Time User Action Codes Description Comment    1/2/2025 10:46 PM Tal Vazquez [R26.2] Ambulatory dysfunction     1/2/2025 10:47 PM Tal Vazquez [M54.50,  G89.29] Chronic low back pain, unspecified back pain laterality, unspecified whether sciatica present                  Tal Vazquez DO  01/03/25 0125

## 2025-01-03 NOTE — PLAN OF CARE
Problem: PHYSICAL THERAPY ADULT  Goal: Performs mobility at highest level of function for planned discharge setting.  See evaluation for individualized goals.  Description: Treatment/Interventions: Functional transfer training, LE strengthening/ROM, Therapeutic exercise, Endurance training, Patient/family training, Bed mobility, Gait training, Spoke to nursing, OT          See flowsheet documentation for full assessment, interventions and recommendations.  Note: Prognosis: Fair  Problem List: Decreased strength, Decreased endurance, Impaired balance, Decreased mobility, Decreased coordination, Obesity, Orthopedic restrictions, Pain  Assessment: Pt is a 59 y.o. female seen for PT evaluation s/p admit to [facility] on 1/2/2025 w/ Ambulatory dysfunction.  Order placed for PT.  Prior to admission, pt required A for mobility, ambulated household distances, lived in one floor environment, had multiple SKY one  railing, lived with daughter, and used rollator for mobility. Upon evaluation: Pt requires  mod of 1 to max of 2 assistance for bed mobility and transfers and ambulation not assessed due to needed clarification of use of LSO for OOB mobility as well as limited strength in BLE .  Pt's clinical presentation is currently unstable/unpredictable given the functional mobility deficits above, especially weakness, decreased skin integrity, pain, decreased activity tolerance, and orthopedic restrictions, coupled with fall risks including impaired balance and obesity, and combined with medical complications of abnormal H&H, abnormal WBCs, abnormal platelets, and readmission to hospital.  The patient's AM-PAC Basic Mobility Inpatient Short Form Raw Score is 11. A raw score less than 16 suggests the patient may benefit from discharge to post-acute rehabilitation services. Please also refer to the recommendation of the Physical Therapist for safe discharge planning.Pt to benefit from continued skilled PT tx while in  hospital and upon DC to address deficits as defined above and maximize level of functional mobility. From PT/mobility standpoint, recommendation at time of d/c would be  Level 2 services for post acute rehab  pending progress in order to maximize pt's functional independence and consistency w/ mobility in order to facilitate return to PLOF.  Recommend ther ex next 1-2 sessions and OOB mobility assessment with PT once bracing clarified .  Barriers to Discharge: Inaccessible home environment, Decreased caregiver support     Rehab Resource Intensity Level, PT: II (Moderate Resource Intensity)    See flowsheet documentation for full assessment.

## 2025-01-03 NOTE — CASE MANAGEMENT
Case Management Discharge Planning Note    Patient name Samantha Rodriguez  Location [place] [address] /South 2 M* MRN [id number]  : 1965 Date 1/3/2025       Current Admission Date: 2025  Current Admission Diagnosis:Ambulatory dysfunction   Patient Active Problem List    Diagnosis Date Noted Date Diagnosed    Acute blood loss anemia 2024     Pain associated with surgical procedure 2024     Toxic encephalopathy 2024     Severe episode of recurrent major depressive disorder, without psychotic features ([facility]) 2024     Chronic low back pain, unspecified back pain laterality, unspecified whether sciatica present 2024     Opioid-induced constipation 2024     Weight gain 2024     Numbness 2023     Memory loss 2023     Hemiplegia, post-stroke ([facility]) 2022     Hypokalemia 2022     Slow transit constipation 03/15/2022     CVA (cerebral vascular accident) ([facility]) 2022     Mixed hyperlipidemia 2021     History of CVA (cerebrovascular accident) 2021     Dysphagia 2020     Ambulatory dysfunction 2020     Status post insertion of spinal cord stimulator 2020     SIRS (systemic inflammatory response syndrome) ([facility]) 2020     Tardive dyskinesia 2020     MIMI (obstructive sleep apnea)      Obesity, morbid ([facility]) 2020     Post laminectomy syndrome 10/07/2019     Bipolar I disorder, most recent episode depressed ([facility]) 2019 09/15/2023    Spinal stenosis of lumbar region with neurogenic claudication 2018     Lumbar radiculopathy 2017     Chronic pain disorder 2017     Lumbar spondylosis 10/31/2016     Generalized anxiety disorder with panic attacks 10/26/2016     Bipolar disorder ([facility]) 2015     PTSD (post-traumatic stress disorder) 2015     Panic disorder without agoraphobia 2015     Tremor 2014     Migraine 2014     GERD without esophagitis 2014     Cognitive  disorder 04/18/2014     Overactive bladder 09/26/2013     Mood insomnia (HCC) 01/31/2013     Urinary incontinence 09/24/2012     Vitamin D deficiency 09/18/2012     Fibromyalgia 09/14/2012     Essential hypertension 09/14/2012       LOS (days): 0  Geometric Mean LOS (GMLOS) (days):   Days to GMLOS:     OBJECTIVE:            Current admission status: Observation   Preferred Pharmacy:   [place], [place] - [address]  [address] [address]  Phone: [phone] Fax: [phone]    [facility]/pharmacy #[id number] - [place]GUET[place] - [address]  [address] [place] [address]  Phone: [phone] Fax: [phone]    [facility] [place] - [place] - [address]  [address]  [place] [address]  Phone: [phone] Fax: [phone]    [facility] [place], [place] - [address]  [address] [facility] [address]  Phone: [phone] Fax: [phone]    [facility] - [place] - [address]  [address]  [place] [address]  Phone: [phone] Fax: [phone]    Primary Care Provider: Glo Valente DO    Primary Insurance: [employer] [facility]  Secondary Insurance: [facility]    DISCHARGE DETAILS:    Additional Comments: CM attempted to contact patient via hospital phone, cell phone and CM left message for patient's daughter to attempt to complete assessment. CM department to follow for further discharge planning.

## 2025-01-03 NOTE — H&P
"H&P - Hospitalist   Name: Samantha Rodriguez 59 y.o. female I MRN: [id number]  Unit/Bed#: [place] [address] -02 I Date of Admission: 1/2/2025   Date of Service: 1/3/2025 I Hospital Day: 0     Assessment & Plan  Ambulatory dysfunction  Patient reports after getting back from rehab she has been struggling to walk secondary to her back pain  She typically ambulates with a walker  PT/OT eval  Lumbar radiculopathy  Status post removal L2-S1 hardware, with new L5-S1 fusion performed by Dr. Stevens on 12/19 without complication  Pt reports following procedure and rehab she has continued to have pain limiting her ambulation  She denies any lower extremity numbness/weakness, saddle parenthesis, bowel or bladder retention/incontinence   She reporting taking oral morphine at home with minimal relief  Supportive care, pain control prn, scheduled tylenol   SIRS (systemic inflammatory response syndrome) (HCC)  Pt was documented to have fever when brought up to the floor +tachycardia  WBC 11.19  On repeat, temp improved to 97.9 without interventions about 2 hours later  Pt denies any infectious symptoms such as fever, chills, cough, congestion, sore throat, SOB, headache, abdominal pain, N/V/D, dysuria, or hematuria  Will check procalc, lactic, BC, UA, and CXR to rule out infectious etiology   Will monitor off antibiotics for now  Essential hypertension  Continue amlodipine and prazosin  Bipolar disorder (HCC)  Mood stable  Continue abilify, zoloft, buspar      VTE Pharmacologic Prophylaxis: VTE Score: 3 Moderate Risk (Score 3-4) - Pharmacological DVT Prophylaxis Ordered: enoxaparin (Lovenox).  Code Status: Level 1 - Full Code   Discussion with family:  Update in AM.     Anticipated Length of Stay: Patient will be admitted on an observation basis with an anticipated length of stay of less than 2 midnights secondary to ambulatory dysfunction.    History of Present Illness   Chief Complaint: \"I can't walk\"    Samantha oRdriguez is a 59 y.o. " female who presents with ambulatory dysfunction secondary to back pain.  Patient reports that she was recently admitted for back surgery and discharged to rehab.  She reports that following getting home from rehab she has continued to have back pain and has struggled to ambulate with her walker.  Patient denies any lower extremity numbness or weakness, saddle paresthesias, urinary or bowel incontinence/retention.  Patient also noted to have fever when she got to the floor.  She denies any infectious symptoms such as fever, chills, cough, congestion, abdominal pain, nausea, vomiting, diarrhea, dysuria, or hematuria.    Review of Systems   Constitutional:  Negative for appetite change, chills, diaphoresis, fatigue and fever.   HENT:  Negative for congestion, rhinorrhea and sore throat.    Eyes:  Negative for photophobia and visual disturbance.   Respiratory:  Negative for cough, shortness of breath and wheezing.    Cardiovascular:  Negative for chest pain, palpitations and leg swelling.   Gastrointestinal:  Negative for abdominal distention, abdominal pain, blood in stool, constipation, diarrhea, nausea and vomiting.   Genitourinary:  Negative for dysuria and hematuria.   Musculoskeletal:  Positive for back pain and gait problem. Negative for arthralgias, myalgias and neck stiffness.   Skin:  Negative for color change and rash.   Neurological:  Negative for dizziness, seizures, syncope, weakness, light-headedness and headaches.   Psychiatric/Behavioral:  Negative for agitation, behavioral problems and confusion. The patient is not nervous/anxious.    All other systems reviewed and are negative.      Historical Information   Past Medical History:   Diagnosis Date    Anxiety     Arthritis     left knee    Arthritis of right knee 10/06/2020    At risk for falls     Bipolar 2 disorder (HCC)     FOLLOWS WITH PSYCHIATRIST. CONTINUE LAMOTRIGINE; RESOLVED: [id number]    Depression     Dysphagia     per [facility] [place]     Familial tremor     both hands    Fibromyalgia     LAST ASSESSED: [id number]    GERD (gastroesophageal reflux disease)     Hearing aid worn     left ear    [place] (hard of hearing)     left ear    Hyperlipidemia     Hypertension     Impaired speech     Left-sided weakness     Lumbar disc disease with radiculopathy 2018    Memory loss of unknown cause     long and short term    Migraine     Multiple closed fractures of metatarsal bone of right foot 2021    Obesity     Obesity, Class II, BMI 35-39.9     Osteoarthritis of both hips 10/31/2016    Osteoarthritis of knee 2013    Description: Continue Tylenol and Naproxen. Encourage exercise and weight loss. Patient refused physical therapy. I will refer the patient back to Orthopedics.    Overactive bladder     Panic attack     Patellofemoral disorder of both knees 2020    Post traumatic stress disorder     Primary localized osteoarthritis of both knees 2017    Primary osteoarthritis of both knees 2020    S/P insertion of spinal cord stimulator     no remote    S/P total knee arthroplasty, right 03/10/2022    Sacroiliitis (HCC) 2017    Seasonal allergies     Seizure-like activity (HCC) 2022    Seizures (HCC)     possible seizure like activity    Small bowel obstruction (HCC) 2023    Status post total knee replacement, left 2022    Stroke ([facility])     questionable stroke 2009    Tardive dyskinesia     PATIENT STATES    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  CONT ASA 81 MG DAILY; RESOLVED: [id number]    Urinary incontinence     Uses walker     Wears dentures     partial lower / full upper- doesnt wear    Wears glasses      Past Surgical History:   Procedure Laterality Date    BACK SURGERY       SECTION      COLONOSCOPY      RESOLVED: 2016    EAR SURGERY      EGD      HYSTERECTOMY  2004    LUMBAR FUSION N/A 2024    Procedure: Removal hardware L2-S1, posterior fusion L5-S1,  navigated revision instrumentation L2 to pelvis; application of bilateral S2 alar screws and bone grafting of the disc at L5-S1;  Surgeon: Yenni Stevens MD;  [facility];  Service: Orthopedics    MYRINGOTOMY W/ TUBES Left     NECK SURGERY  04/2019    WY ARTHRP KNE CONDYLE&PLATU MEDIAL&LAT COMPARTMENTS Right 03/09/2022    Procedure: ARTHROPLASTY KNEE TOTAL;  Surgeon: Chandni Tuttle DO;  [facility];  Service: Orthopedics    WY ARTHRP KNE CONDYLE&PLATU MEDIAL&LAT COMPARTMENTS Left 07/05/2022    Procedure: ARTHROPLASTY KNEE TOTAL;  Surgeon: Chandni Tuttle DO;  [facility];  Service: Orthopedics    WY CYSTOURETHROSCOPY N/A 02/18/2016    Procedure: CYSTOSCOPY, BOTOX INJECTION;  Surgeon: Abraham Teague MD;  [facility];  Service: Gynecology    WY INSJ/RPLCMT SPINAL NPG/RCVR POCKET CRTJ&CONNJ Right 02/10/2021    Procedure: REPLACEMENT IMPLANTABLE PULSE GENERATOR DORSAL SPINAL COLUMN STIMULATOR, RIGHT;  Surgeon: Carlos Alberto Jacome MD;  [facility];  Service: Neurosurgery    WY PRQ IMPLTJ NSTIM ELECTRODE ARRAY EPIDURAL Right 07/28/2020    Procedure: INSERTION THORACIC DORSAL COLUMN SPINAL CORD STIMULATOR PERCUTANEOUS W IMPLANTABLE PULSE GENERATOR, RIGHT;  Surgeon: Carlos Alberto Jacome MD;  [facility];  Service: Neurosurgery    TONSILLECTOMY      TUBAL LIGATION  1986    UPPER GASTROINTESTINAL ENDOSCOPY  09/2020     Social History     Tobacco Use    Smoking status: Never    Smokeless tobacco: Never   Vaping Use    Vaping status: Never Used   Substance and Sexual Activity    Alcohol use: Not Currently     Comment: 2 x year; being a social drinker as per all scripts     Drug use: No    Sexual activity: Not Currently     E-Cigarette/Vaping    E-Cigarette Use Never User      E-Cigarette/Vaping Substances    Nicotine No     THC No     CBD No     Flavoring No     Other No     Unknown No      Family History   Problem Relation Age of Onset    Colon cancer Mother      Alzheimer's disease Father     Stroke Father     No Known Problems Sister     Colon cancer Brother     Bipolar disorder Brother     No Known Problems Brother     Depression Paternal Grandfather     Breast cancer Maternal Aunt     Colon cancer Maternal Aunt     Seizures Son     Heart disease Other     Diabetes Other     Hypertension Other     Thyroid disease Neg Hx      Social History:  Marital Status:    Patient Pre-hospital Living Situation: Home  Patient Pre-hospital Level of Mobility: walks with walker  Patient Pre-hospital Diet Restrictions: None    Meds/Allergies   I have reviewed home medications using recent Epic encounter.  Prior to Admission medications    Medication Sig Start Date End Date Taking? Authorizing Provider   amLODIPine (NORVASC) 5 mg tablet Take 1 tablet (5 mg total) by mouth daily 12/2/24 3/2/25 Yes Oleksandr Massey MD   ARIPiprazole (ABILIFY) 10 mg tablet Take 1 tablet (10 mg total) by mouth daily 12/3/24  Yes Oleksandr Massey MD   atorvastatin (LIPITOR) 40 mg tablet Take 1 tablet (40 mg total) by mouth daily with dinner 12/2/24  Yes Oleksandr Massey MD   busPIRone (BUSPAR) 15 mg tablet Take 1 tablet (15 mg total) by mouth 3 (three) times a day 12/2/24  Yes Oleksandr Massey MD   cyclobenzaprine (FLEXERIL) 10 mg tablet Take 1 tablet (10 mg total) by mouth 3 (three) times a day as needed for muscle spasms 12/2/24  Yes Oleksandr Massey MD   hydrOXYzine HCL (ATARAX) 25 mg tablet Take 1 tablet (25 mg total) by mouth 3 (three) times a day 12/2/24  Yes Oleksandr Massey MD   sertraline (ZOLOFT) 100 mg tablet Take 2 tablets (200 mg total) by mouth daily at bedtime 12/2/24  Yes Oleksandr Massey MD   traZODone (DESYREL) 300 MG tablet Take 1 tablet (300 mg total) by mouth daily at bedtime 12/2/24  Yes Oleksandr Massey MD   acetaminophen (TYLENOL) 325 mg tablet Take 2 tablets (650 mg total) by mouth every 4 (four)  hours as needed for moderate pain 12/2/24   Oleksandr Massey MD   aluminum-magnesium hydroxide-simethicone (MAALOX) 9713-8431-473 mg/30 mL suspension Take 30 mL by mouth in the morning 12/2/24   Oleksandr Massey MD   aspirin (Aspirin Low Dose) 81 mg EC tablet Take 1 tablet (81 mg total) by mouth daily 12/2/24   Oleksandr Massey MD   bisacodyl (DULCOLAX) 10 mg suppository Insert 10 mg into the rectum as needed for constipation As needed on day 5 of no bowel movement    Historical Provider, MD   Cholecalciferol (VITAMIN D3) 1,000 units tablet Take 1 tablet (1,000 Units total) by mouth daily 12/2/24   Oleksandr Massey MD   Diclofenac Sodium (VOLTAREN) 1 % Apply 2 g topically 4 (four) times a day as needed (pain) 7/23/24   Yfn Ruelas DO   loratadine (CLARITIN) 10 mg tablet Take 1 tablet (10 mg total) by mouth daily 12/3/24   Oleksandr Massey MD   lubiprostone (AMITIZA) 8 mcg capsule Take 1 capsule (8 mcg total) by mouth 2 (two) times a day 12/2/24   Oleksandr Massey MD   magnesium Oxide (MAG-OX) 400 mg TABS Take 1 tablet (400 mg total) by mouth daily 12/3/24   Oleksandr Massey MD   morphine (MSIR) 15 mg tablet Take 1 tablet (15 mg total) by mouth 2 (two) times a day Max Daily Amount: 30 mg 12/2/24 1/1/25  Oleksandr Massey MD   morphine (MSIR) 15 mg tablet Take 1 tablet (15 mg total) by mouth every 6 (six) hours as needed for severe pain (Please increase dose from 2x per day to 3x per day.) for up to 30 doses Max Daily Amount: 60 mg 12/26/24   Yenni Stevens MD   naloxone (NARCAN) 4 mg/0.1 mL nasal spray Administer 1 spray into a nostril. If no response after 2-3 minutes, give another dose in the other nostril using a new spray. 12/26/24 12/26/25  Yenni Stevens MD   ondansetron (ZOFRAN) 4 mg tablet  12/6/24   Historical Provider, MD   pantoprazole (PROTONIX) 40 mg tablet Take 1 tablet (40 mg total) by mouth daily in the early morning  12/3/24   Oleksandr Massey MD   prazosin (MINIPRESS) 2 mg capsule Take 1 capsule (2 mg total) by mouth daily at bedtime 12/2/24   Oleksandr Massey MD   pregabalin (LYRICA) 150 mg capsule Take 1 capsule (150 mg total) by mouth 3 (three) times a day 12/2/24 1/1/25  Oleksandr Massey MD   senna-docusate sodium (SENOKOT S) 8.6-50 mg per tablet Take 1 tablet by mouth daily as needed for constipation 12/2/24   Oleksandr Massey MD   sodium phosphate (PEDIA-LAX) 3.5-9.5 g 59 mL enema Insert 1 enema into the rectum once    Historical Provider, MD   tirzepatide (Zepbound) 2.5 mg/0.5 mL auto-injector Inject 2.5 mg under the skin Once a week 12/5/24   Historical Provider, MD   Valbenazine Tosylate (Ingrezza) 60 MG CAPS Take 1 capsule by mouth in the morning 12/2/24   Oleksandr Massey MD     No Known Allergies    Objective :  Temp:  [97.9 °F (36.6 °C)-101.8 °F (38.8 °C)] 97.9 °F (36.6 °C)  HR:  [] 85  BP: (122-157)/(74-85) 122/79  Resp:  [16-20] 16  SpO2:  [94 %-100 %] 94 %  O2 Device: None (Room air)    Physical Exam  Vitals and nursing note reviewed.   Constitutional:       General: She is not in acute distress.     Appearance: She is well-developed. She is obese.   HENT:      Head: Normocephalic and atraumatic.      Nose: Nose normal. No congestion.      Mouth/Throat:      Mouth: Mucous membranes are moist.      Pharynx: Oropharynx is clear.   Eyes:      Conjunctiva/sclera: Conjunctivae normal.   Cardiovascular:      Rate and Rhythm: Normal rate and regular rhythm.      Heart sounds: Normal heart sounds. No murmur heard.     No friction rub. No gallop.   Pulmonary:      Effort: Pulmonary effort is normal. No respiratory distress.      Breath sounds: Normal breath sounds. No wheezing, rhonchi or rales.   Abdominal:      General: Bowel sounds are normal. There is no distension.      Palpations: Abdomen is soft.      Tenderness: There is no abdominal tenderness.    Musculoskeletal:         General: Tenderness (lower back) present.      Cervical back: Neck supple.      Right lower leg: No edema.      Left lower leg: No edema.   Skin:     General: Skin is warm and dry.      Capillary Refill: Capillary refill takes less than 2 seconds.   Neurological:      General: No focal deficit present.      Mental Status: She is alert and oriented to person, place, and time. Mental status is at baseline.   Psychiatric:         Mood and Affect: Mood normal.         Behavior: Behavior normal.          Lines/Drains:            Lab Results: I have reviewed the following results:  Results from last 7 days   Lab Units 01/02/25  2200   WBC Thousand/uL 11.19*   HEMOGLOBIN g/dL 10.5*   HEMATOCRIT % 32.7*   PLATELETS Thousands/uL 434*   BANDS PCT % 1   LYMPHO PCT % 2*   MONO PCT % 7   EOS PCT % 0     Results from last 7 days   Lab Units 01/02/25  2200   SODIUM mmol/L 135   POTASSIUM mmol/L 3.6   CHLORIDE mmol/L 99   CO2 mmol/L 23   BUN mg/dL 8   CREATININE mg/dL 0.69   ANION GAP mmol/L 13   CALCIUM mg/dL 8.9   GLUCOSE RANDOM mg/dL 101             Lab Results   Component Value Date    HGBA1C 4.8 11/18/2024    HGBA1C 5.2 02/20/2024    HGBA1C 4.9 11/30/2023           Imaging Results Review: No pertinent imaging studies reviewed.  Other Study Results Review: EKG was reviewed.     Administrative Statements   I have spent a total time of 65 minutes in caring for this patient on the day of the visit/encounter including Diagnostic results, Impressions, Counseling / Coordination of care, Documenting in the medical record, Reviewing / ordering tests, medicine, procedures  , Obtaining or reviewing history  , and Communicating with other healthcare professionals .    ** Please Note: This note has been constructed using a voice recognition system. **

## 2025-01-03 NOTE — ASSESSMENT & PLAN NOTE
Pt was documented to have fever when brought up to the floor +tachycardia  WBC 11.19  On repeat, temp improved to 97.9 without interventions about 2 hours later  Pt denies any infectious symptoms such as fever, chills, cough, congestion, sore throat, SOB, headache, abdominal pain, N/V/D, dysuria, or hematuria  Will check procalc, lactic, BC, UA, and CXR to rule out infectious etiology   Will monitor off antibiotics for now

## 2025-01-03 NOTE — UTILIZATION REVIEW
Initial Clinical Review    WAS OBSERVATION 1/2 CONVERTED TO INPATIENT ADMISSION 1/3 DUE TO CONTINUED STAY REQUIRED TO CARE FOR PATIENT WITH SIRS, Lumbar Radiculopathy, Ambulatory Dysfunction. Pain control. Iv antibiotics. Work up and treat    Admission: Date/Time/Statement:   Admission Orders (From admission, onward)       Ordered        01/03/25 1445  INPATIENT ADMISSION  Once            01/02/25 2247  Place in Observation  Once                          Orders Placed This Encounter   Procedures    INPATIENT ADMISSION     Standing Status:   Standing     Number of Occurrences:   1     Level of Care:   Med Surg [16]     Estimated length of stay:   More than 2 Midnights     Certification:   I certify that inpatient services are medically necessary for this patient for a duration of greater than two midnights. See H&P and MD Progress Notes for additional information about the patient's course of treatment.     ED Arrival Information       Expected   -    Arrival   1/2/2025 20:06    Acuity   Urgent              Means of arrival   Wheelchair    Escorted by   Self    Service   Hospitalist    Admission type   Emergency              Arrival complaint   Recent spine surgery, pain, unable to walk, Swelling             Chief Complaint   Patient presents with    Back Pain     Pt states having back surgery recently and getting cleared from rehab on 12/30. States that she feels she was sent home too early. Pain in her L hip and unable to walk. States meds are not helping       Initial Presentation: 59 y.o. female to ED presents for Back pain with ambulatory dysfunction. Per pt, she was hospitalization from 12/19 to 12/24/24 for back surgery and discharged to rehab. Reports that following getting home from rehab she has continued to have back pain and has struggled to ambulate with her walker. Noted with fever on unit. States she had minimal relief with po morphine at home. PMH for Bipolar disorder, HTN  Admit to Observation Dx;  SIRS, Lumbar Radiculopathy, Ambulatory Dysfunction  12/19 S/p removal L2-S1 hardware, with new L5-S1 fusion   Plan; Procalcitonin. Lactic acid. Bld cultures. UA. CXR. Monitor off antibiotics for now.  Pain control.   On exam; Tenderness lower back    1/3 Changed to Inpatient status  Procalcitonin 0.36 today. Bld cultures pending. Iv antibiotics started.  RSV PCR positive    ED Treatment-Medication Administration from 01/02/2025 2006 to 01/02/2025 2341         Date/Time Order Dose Route Action     01/02/2025 2155 HYDROmorphone (DILAUDID) injection 0.5 mg 0.5 mg Intravenous Given     01/02/2025 2308 potassium chloride (Klor-Con M20) CR tablet 20 mEq 20 mEq Oral Given     01/02/2025 2325 lidocaine (LIDODERM) 5 % patch 1 patch 1 patch Topical Medication Applied     01/02/2025 2325 acetaminophen (TYLENOL) tablet 975 mg 975 mg Oral Given            Scheduled Medications:  acetaminophen, 975 mg, Oral, Q8H KESHIA  aluminum-magnesium hydroxide-simethicone, 30 mL, Oral, Daily  amLODIPine, 5 mg, Oral, Daily  ARIPiprazole, 10 mg, Oral, Daily  aspirin, 81 mg, Oral, Daily  atorvastatin, 40 mg, Oral, Daily With Dinner  busPIRone, 15 mg, Oral, TID  cefTRIAXone, 1,000 mg, Intravenous, Q24H  enoxaparin, 40 mg, Subcutaneous, Daily  hydrOXYzine HCL, 25 mg, Oral, TID  lidocaine, 1 patch, Topical, Daily  lubiprostone, 8 mcg, Oral, BID  pantoprazole, 40 mg, Oral, Early Morning  prazosin, 2 mg, Oral, HS  pregabalin, 150 mg, Oral, TID  sertraline, 200 mg, Oral, HS  Valbenazine Tosylate, 1 capsule, Oral, Daily      Continuous IV Infusions: None     PRN Meds:  bisacodyl, 10 mg, Rectal, Daily PRN  cyclobenzaprine, 10 mg, Oral, TID PRN  HYDROmorphone, 0.5 mg, Intravenous, Q6H PRN 1/3 x2  ondansetron, 4 mg, Intravenous, Q6H PRN 1/3 x1  oxyCODONE, 5 mg, Oral, Q4H PRN 1/3 x1   Or  oxyCODONE, 10 mg, Oral, Q4H PRN 1/3 x1  pneumococcal 20-cheryl conj vacc, 0.5 mL, Intramuscular, Once PRN  senna-docusate sodium, 1 tablet, Oral, Daily PRN      ED Triage  Vitals   Temperature Pulse Respirations Blood Pressure SpO2 Pain Score   01/02/25 2014 01/02/25 2011 01/02/25 2011 01/02/25 2011 01/02/25 2011 01/02/25 2155   98.8 °F (37.1 °C) (!) 118 20 148/74 100 % 10 - Worst Possible Pain     Weight (last 2 days)       Date/Time Weight    01/03/25 0600 92.8 (204.59)    01/02/25 2356 91.1 (200.84)            Vital Signs (last 3 days)       Date/Time Temp Pulse Resp BP MAP (mmHg) SpO2 O2 Device Patient Position - Orthostatic VS Pain    01/03/25 1424 -- -- -- -- -- -- -- -- 8 01/03/25 14:02:25 -- 87 -- -- -- 99 % -- -- --    01/03/25 0919 -- -- -- -- -- -- -- -- 9 01/03/25 0855 -- 79 -- 105/61 76 98 % -- -- --    01/03/25 0854 98.7 °F (37.1 °C) 72 -- -- -- 99 % -- -- --    01/03/25 0611 -- -- -- -- -- -- -- -- 7 01/03/25 0405 -- -- -- -- -- -- -- -- 9 01/03/25 0121 97.9 °F (36.6 °C) -- -- -- -- -- -- -- --    01/03/25 0035 -- -- -- -- -- -- -- -- 9 01/02/25 2357 -- -- -- -- -- -- -- -- 9 01/02/25 23:45:17 101.8 °F (38.8 °C) 85 16 122/79 93 94 % -- -- --    01/02/25 2334 -- 93 18 135/85 -- 98 % None (Room air) Lying --    01/02/25 2325 -- -- -- -- -- -- -- -- 7 01/02/25 2309 -- -- -- -- -- -- -- -- 8    01/02/25 2216 -- 85 18 157/84 -- 100 % None (Room air) Lying --    01/02/25 2155 -- -- -- -- -- -- -- -- 10 - Worst Possible Pain    01/02/25 2048 -- -- -- -- -- -- None (Room air) -- --    01/02/25 2014 98.8 °F (37.1 °C) -- -- -- -- -- -- -- --    01/02/25 2011 -- 118 20 148/74 -- 100 % None (Room air) Sitting --            Pertinent Labs/Diagnostic Test Results:   Radiology:  XR chest portable   Final Interpretation by Mario Downs MD (01/03 0831)      No acute cardiopulmonary disease.            Workstation performed: [id number]         CT lumbar spine without contrast   Final Interpretation by Sagar Galindo DO (01/02 2145)   No acute fracture or dislocation. Postoperative changes as detailed above.      Workstation performed: [id number]            Cardiology:  No orders to display     GI:  No orders to display       Results from last 7 days   Lab Units 01/03/25  0854   SARS-COV-2  Negative     Results from last 7 days   Lab Units 01/03/25  0315 01/02/25  2200   WBC Thousand/uL 9.50 11.19*   HEMOGLOBIN g/dL 9.3* 10.5*   HEMATOCRIT % 28.7* 32.7*   PLATELETS Thousands/uL 405* 434*   TOTAL NEUT ABS Thousands/µL 7.73*  --    BANDS PCT %  --  1         Results from last 7 days   Lab Units 01/03/25  0315 01/02/25  2200   SODIUM mmol/L 136 135   POTASSIUM mmol/L 3.6 3.6   CHLORIDE mmol/L 101 99   CO2 mmol/L 27 23   ANION GAP mmol/L 8 13   BUN mg/dL 10 8   CREATININE mg/dL 0.92 0.69   EGFR ml/min/1.73sq m 67 94   CALCIUM mg/dL 8.4 8.9   MAGNESIUM mg/dL 2.1  --              Results from last 7 days   Lab Units 01/03/25  0315 01/02/25  2200   GLUCOSE RANDOM mg/dL 143* 101       Results from last 7 days   Lab Units 01/03/25  0315   PROCALCITONIN ng/ml 0.36*     Results from last 7 days   Lab Units 01/03/25  0315   LACTIC ACID mmol/L 0.8     Results from last 7 days   Lab Units 01/03/25  0626   CLARITY UA  Turbid   COLOR UA  [place]   SPEC GRAV UA  1.025   PH UA  6.5   GLUCOSE UA mg/dl Negative   KETONES UA mg/dl 60 (2+)*   BLOOD UA  Moderate*   PROTEIN UA mg/dl 50 (1+)*   NITRITE UA  Negative   BILIRUBIN UA  Small*   UROBILINOGEN UA (BE) mg/dl >=12.0*   LEUKOCYTES UA  Large*   WBC UA /hpf 30-50*   RBC UA /hpf 10-20*   BACTERIA UA /hpf Moderate*   EPITHELIAL CELLS WET PREP /hpf Innumerable*   MUCUS THREADS  Occasional*     Results from last 7 days   Lab Units 01/03/25  0854   INFLUENZA A PCR  Negative   INFLUENZA B PCR  Negative   RSV PCR  Positive*       Results from last 7 days   Lab Units 01/03/25  0316   BLOOD CULTURE  Received in Microbiology Lab. Culture in Progress.  Received in Microbiology Lab. Culture in Progress.         Past Medical History:   Diagnosis Date    Anxiety     Arthritis     left knee    Arthritis of right knee 10/06/2020    At risk for falls      Bipolar 2 disorder (HCC)     FOLLOWS WITH PSYCHIATRIST. CONTINUE LAMOTRIGINE; RESOLVED: [id number]    Depression     Dysphagia     per [facility] facility paperwork    Familial tremor     both hands    Fibromyalgia     LAST ASSESSED: 08DEC2017    GERD (gastroesophageal reflux disease)     Hearing aid worn     left ear    [place] (hard of hearing)     left ear    Hyperlipidemia     Hypertension     Impaired speech     Left-sided weakness     Lumbar disc disease with radiculopathy 02/02/2018    Memory loss of unknown cause     long and short term    Migraine     Multiple closed fractures of metatarsal bone of right foot 05/02/2021    Obesity     Obesity, Class II, BMI 35-39.9     Osteoarthritis of both hips 10/31/2016    Osteoarthritis of knee 02/20/2013    Description: Continue Tylenol and Naproxen. Encourage exercise and weight loss. Patient refused physical therapy. I will refer the patient back to Orthopedics.    Overactive bladder     Panic attack     Patellofemoral disorder of both knees 05/01/2020    Post traumatic stress disorder     Primary localized osteoarthritis of both knees 06/16/2017    Primary osteoarthritis of both knees 05/01/2020    S/P insertion of spinal cord stimulator     no remote    S/P total knee arthroplasty, right 03/10/2022    Sacroiliitis (HCC) 02/28/2017    Seasonal allergies     Seizure-like activity ([facility]) 06/03/2022    Seizures ([facility])     possible seizure like activity    Small bowel obstruction ([facility]) 03/24/2023    Status post total knee replacement, left 07/05/2022    Stroke (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  CONT ASA 81 MG DAILY; RESOLVED: [id number]    Urinary incontinence     Uses walker     Wears dentures     partial lower / full upper- doesnt wear    Wears glasses      Present on Admission:   Ambulatory dysfunction   SIRS (systemic inflammatory response syndrome) ([facility])   Essential  hypertension   Lumbar radiculopathy   Bipolar disorder (HCC)      Admitting Diagnosis: Swelling [R60.9]  Ambulatory dysfunction [R26.2]  Chronic low back pain, unspecified back pain laterality, unspecified whether sciatica present [M54.50, G89.29]  Age/Sex: 59 y.o. female    Network Utilization Review Department  ATTENTION: Please call with any questions or concerns to [phone] and carefully listen to the prompts so that you are directed to the right person. All voicemails are confidential.   For Discharge needs, contact Care Management DC Support Team at [phone] opt. 2  Send all requests for admission clinical reviews, approved or denied determinations and any other requests to dedicated fax number below belonging to the campus where the patient is receiving treatment. List of dedicated fax numbers for the Facilities:  FACILITY NAME UR FAX NUMBER   ADMISSION DENIALS (Administrative/Medical Necessity) [phone]   DISCHARGE SUPPORT TEAM (NETWORK) [phone]   PARENT CHILD HEALTH (Maternity/NICU/Pediatrics) [phone]   [facility] [phone]   General acute hospital [phone]   [facility] [phone]   [facility] [phone]   [facility] [phone]   [facility] [phone]   [facility] [phone]   [facility] [phone]   [facility] [phone]   [facility] [phone]   [facility] [phone]   [facility] [phone]

## 2025-01-03 NOTE — PLAN OF CARE
Problem: PAIN - ADULT  Goal: Verbalizes/displays adequate comfort level or baseline comfort level  Description: Interventions:  - Encourage patient to monitor pain and request assistance  - Assess pain using appropriate pain scale  - Administer analgesics based on type and severity of pain and evaluate response  - Implement non-pharmacological measures as appropriate and evaluate response  - Consider cultural and social influences on pain and pain management  - Notify physician/advanced practitioner if interventions unsuccessful or patient reports new pain  Outcome: Progressing     Problem: INFECTION - ADULT  Goal: Absence or prevention of progression during hospitalization  Description: INTERVENTIONS:  - Assess and monitor for signs and symptoms of infection  - Monitor lab/diagnostic results  - Monitor all insertion sites, i.e. indwelling lines, tubes, and drains  - Monitor endotracheal if appropriate and nasal secretions for changes in amount and color  - [place] appropriate cooling/warming therapies per order  - Administer medications as ordered  - Instruct and encourage patient and family to use good hand hygiene technique  - Identify and instruct in appropriate isolation precautions for identified infection/condition  Outcome: Progressing     Problem: SAFETY ADULT  Goal: Patient will remain free of falls  Description: INTERVENTIONS:  - Educate patient/family on patient safety including physical limitations  - Instruct patient to call for assistance with activity   - Consult OT/PT to assist with strengthening/mobility   - Keep Call bell within reach  - Keep bed low and locked with side rails adjusted as appropriate  - Keep care items and personal belongings within reach  - Initiate and maintain comfort rounds  - Make Fall Risk Sign visible to staff  - Offer Toileting every 2 Hours, in advance of need  - Initiate/Maintain bed alarm  - Obtain necessary fall risk management equipment: bed alarms   - Apply  yellow socks and bracelet for high fall risk patients  - Consider moving patient to room near nurses station  Outcome: Progressing  Goal: Maintain or return to baseline ADL function  Description: INTERVENTIONS:  -  Assess patient's ability to carry out ADLs; assess patient's baseline for ADL function and identify physical deficits which impact ability to perform ADLs (bathing, care of mouth/teeth, toileting, grooming, dressing, etc.)  - Assess/evaluate cause of self-care deficits   - Assess range of motion  - Assess patient's mobility; develop plan if impaired  - Assess patient's need for assistive devices and provide as appropriate  - Encourage maximum independence but intervene and supervise when necessary  - Involve family in performance of ADLs  - Assess for home care needs following discharge   - Consider OT consult to assist with ADL evaluation and planning for discharge  - Provide patient education as appropriate  Outcome: Progressing  Goal: Maintains/Returns to pre admission functional level  Description: INTERVENTIONS:  - Perform AM-PAC 6 Click Basic Mobility/ Daily Activity assessment daily.  - Set and communicate daily mobility goal to care team and patient/family/caregiver.   - Collaborate with rehabilitation services on mobility goals if consulted  - Perform Range of Motion 4 times a day.  - Reposition patient every 2 hours.  - Dangle patient 3 times a day  - Stand patient 3 times a day  - Ambulate patient 3 times a day  - Out of bed to chair 3 times a day   - Out of bed for meals 3 times a day  - Out of bed for toileting  - Record patient progress and toleration of activity level   Outcome: Progressing     Problem: DISCHARGE PLANNING  Goal: Discharge to home or other facility with appropriate resources  Description: INTERVENTIONS:  - Identify barriers to discharge w/patient and caregiver  - Arrange for needed discharge resources and transportation as appropriate  - Identify discharge learning needs  (meds, wound care, etc.)  - Arrange for interpretive services to assist at discharge as needed  - Refer to Case Management Department for coordinating discharge planning if the patient needs post-hospital services based on physician/advanced practitioner order or complex needs related to functional status, cognitive ability, or social support system  Outcome: Progressing     Problem: Knowledge Deficit  Goal: Patient/family/caregiver demonstrates understanding of disease process, treatment plan, medications, and discharge instructions  Description: Complete learning assessment and assess knowledge base.  Interventions:  - Provide teaching at level of understanding  - Provide teaching via preferred learning methods  Outcome: Progressing     Problem: SKIN/TISSUE INTEGRITY - ADULT  Goal: Skin Integrity remains intact(Skin Breakdown Prevention)  Description: Assess:  -Perform Ignacio assessment every shift  -Clean and moisturize skin every shift  -Inspect skin when repositioning, toileting, and assisting with ADLS  -Assess under medical devices such as Masimo every shift  -Assess extremities for adequate circulation and sensation     Bed Management:  -Have minimal linens on bed & keep smooth, unwrinkled  -Change linens as needed when moist or perspiring  -Avoid sitting or lying in one position for more than 1 hours while in bed  -Keep HOB at 30 degrees     Toileting:  -Offer bedside commode  -Assess for incontinence every shift  -Use incontinent care products after each incontinent episode such as shield wipes     Activity:  -Mobilize patient 3 times a day  -Encourage activity and walks on unit  -Encourage or provide ROM exercises   -Turn and reposition patient every 2 Hours  -Use appropriate equipment to lift or move patient in bed  -Instruct/ Assist with weight shifting every 1 hour when out of bed in chair  -Consider limitation of chair time 1 hour intervals    Skin Care:  -Avoid use of baby powder, tape, friction and  shearing, hot water or constrictive clothing  -Relieve pressure over bony prominences using wedges   -Do not massage red bony areas    Next Steps:  -Teach patient strategies to minimize risks such as skin breakdown   -Consider consults to  interdisciplinary teams such as wound care nurse   Outcome: Progressing  Goal: Incision(s), wounds(s) or drain site(s) healing without S/S of infection  Description: INTERVENTIONS  - Assess and document dressing, incision, wound bed, drain sites and surrounding tissue  - Provide patient and family education  - Perform skin care/dressing changes every shift  Outcome: Progressing     Problem: HEMATOLOGIC - ADULT  Goal: Maintains hematologic stability  Description: INTERVENTIONS  - Assess for signs and symptoms of bleeding or hemorrhage  - Monitor labs  - Administer supportive blood products/factors as ordered and appropriate  Outcome: Progressing     Problem: MUSCULOSKELETAL - ADULT  Goal: Maintain or return mobility to safest level of function  Description: INTERVENTIONS:  - Assess patient's ability to carry out ADLs; assess patient's baseline for ADL function and identify physical deficits which impact ability to perform ADLs (bathing, care of mouth/teeth, toileting, grooming, dressing, etc.)  - Assess/evaluate cause of self-care deficits   - Assess range of motion  - Assess patient's mobility  - Assess patient's need for assistive devices and provide as appropriate  - Encourage maximum independence but intervene and supervise when necessary  - Involve family in performance of ADLs  - Assess for home care needs following discharge   - Consider OT consult to assist with ADL evaluation and planning for discharge  - Provide patient education as appropriate  Outcome: Progressing  Goal: Maintain proper alignment of affected body part  Description: INTERVENTIONS:  - Support, maintain and protect limb and body alignment  - Provide patient/ family with appropriate education  Outcome:  Progressing

## 2025-01-03 NOTE — PLAN OF CARE
Problem: OCCUPATIONAL THERAPY ADULT  Goal: Performs self-care activities at highest level of function for planned discharge setting.  See evaluation for individualized goals.  Description: Treatment Interventions: Functional transfer training, ADL retraining, UE strengthening/ROM, Endurance training, Cognitive reorientation, Patient/family training, Equipment evaluation/education, Compensatory technique education, Energy conservation, Activityengagement          See flowsheet documentation for full assessment, interventions and recommendations.   Note: Limitation: Decreased ADL status, Decreased UE ROM, Decreased UE strength, Decreased Safe judgement during ADL, Decreased endurance, Decreased cognition, Decreased high-level ADLs, Decreased self-care trans  Prognosis: Fair  Assessment: Pt is a 59 y.o. female who [facility]/2025 with back pain. Pt with diagnosis of ambulatory dysfunction, lumbar radiculopathy, and RSV. Per ortho (12/24), Pt with spinal precautions and LSO for comfort. Pt  has a past medical history of Anxiety, Arthritis, Bipolar 2 disorder (HCC), Depression, Dysphagia, Familial tremor, Fibromyalgia, GERD, Hearing aid worn, [place] (hard of hearing), Hyperlipidemia, Hypertension, Impaired speech, Left-sided weakness, Lumbar disc disease with radiculopathy (02/02/2018), Obesity, Class II, BMI 35-39.9, Osteoarthritis of both hips (10/31/2016), Osteoarthritis of knee (02/20/2013), Overactive bladder, Panic attack, Patellofemoral disorder of both knees (05/01/2020), Post traumatic stress disorder, Primary localized osteoarthritis of both knees (06/16/2017), S/P insertion of spinal cord stimulator, S/P total knee arthroplasty, right (03/10/2022), Sacroiliitis ([facility]) (02/28/2017), Seasonal allergies, Seizure-like activity ([facility]) (06/03/2022), Seizures ([facility]), Small bowel obstruction ([facility]) (03/24/2023), Status post total knee replacement, left (07/05/2022), Stroke ([facility]), Tardive dyskinesia, Thrombosis  of cerebral arteries, Urinary incontinence, Uses walker, Wears dentures, and Wears glasses. Pt greeted bedside for OT evaluation on 01/03/25. Pt resides in daughters home in a 2SH. Pt was [facility], however, unable to manage when she got home. PTA, Pt reports requiring A with ADLs and functional mobility with rollator. Pt may be unreliable historian and to clarify with CM. Pt demonstrating the following occupational performance levels: Mod A  with UB ADLs, Total Dependent with LB ADLs, and mod A-Max Ax2 with bed mobility. Limitations that impact functional performance include decreased ADL status, UE ROM, UE strength, safe judgement during ADLs, cognition, endurance, self care transfers, and high level ADLs. Occupational performance areas to address ADL retraining, functional transfer training, UE strengthening/ROM, endurance training, cognitive reorientation, Pt/caregiver education, equipment evaluation/education, compensatory technique education, energy conservation, and activity engagement . Pt would benefit from continued skilled OT services while in hospital to maximize independence with ADLs. Will continue to follow Pt's progress. Pt would benefit from level II resources (moderate intensity) upon DC to maximize safety and independence with ADLs and functional tasks of choice.     Rehab Resource Intensity Level, OT: II (Moderate Resource Intensity)

## 2025-01-03 NOTE — CASE MANAGEMENT
Case Management Discharge Planning Note    Patient name Samantha Rodriguez  Location [facility] 2 /[facility] 2 M* MRN [id number]  : 1965 Date 1/3/2025       Current Admission Date: 2025  Current Admission Diagnosis:Ambulatory dysfunction   Patient Active Problem List    Diagnosis Date Noted Date Diagnosed    Acute blood loss anemia 2024     Pain associated with surgical procedure 2024     Toxic encephalopathy 2024     Severe episode of recurrent major depressive disorder, without psychotic features ([facility]) 2024     Chronic low back pain, unspecified back pain laterality, unspecified whether sciatica present 2024     Opioid-induced constipation 2024     Weight gain 2024     Numbness 2023     Memory loss 2023     Hemiplegia, post-stroke ([facility]) 2022     Hypokalemia 2022     Slow transit constipation 03/15/2022     CVA (cerebral vascular accident) ([facility]) 2022     Mixed hyperlipidemia 2021     History of CVA (cerebrovascular accident) 2021     Dysphagia 2020     Ambulatory dysfunction 2020     Status post insertion of spinal cord stimulator 2020     SIRS (systemic inflammatory response syndrome) ([facility]) 2020     Tardive dyskinesia 2020     MIMI (obstructive sleep apnea)      Obesity, morbid ([facility]) 2020     Post laminectomy syndrome 10/07/2019     Bipolar I disorder, most recent episode depressed ([facility]) 2019 09/15/2023    Spinal stenosis of lumbar region with neurogenic claudication 2018     Lumbar radiculopathy 2017     Chronic pain disorder 2017     Lumbar spondylosis 10/31/2016     Generalized anxiety disorder with panic attacks 10/26/2016     Bipolar disorder ([facility]) 2015     PTSD (post-traumatic stress disorder) 2015     Panic disorder without agoraphobia 2015     Tremor 2014     Migraine 2014     GERD without esophagitis 2014     Cognitive  disorder 04/18/2014     Overactive bladder 09/26/2013     Mood insomnia (HCC) 01/31/2013     Urinary incontinence 09/24/2012     Vitamin D deficiency 09/18/2012     Fibromyalgia 09/14/2012     Essential hypertension 09/14/2012       LOS (days): 0  Geometric Mean LOS (GMLOS) (days):   Days to GMLOS:     OBJECTIVE:  Risk of Unplanned Readmission Score: 24.23         Current admission status: Inpatient   Preferred Pharmacy:   [place], [place] - [address]  [address] [facility] [facility] [address]  Phone: [phone] Fax: [phone]    [id number] - [place] - [address]  [address]  [place] [address]  Phone: [phone] Fax: [phone]    [facility] [place] - [place] - [address]  [address]  [place] [address]  Phone: [phone] Fax: [phone]    [facility] [place], [place] - [address]  [address] [facility] [address]  Phone: [phone] Fax: [phone]    [facility] - [place], [place] - [address]  [address]  [place] [address]  Phone: [phone] Fax: [phone]    Primary Care Provider: Glo Valente DO    Primary Insurance: [employer] [facility]  Secondary Insurance: [facility]    DISCHARGE DETAILS:     Additional Comments: CM attempted to contact patient to complete assessment a second time for follow up, CM had bedside nurse inform patient. CM reviewed file, patient reported to bedside nurse from; although recent hospitalization discharge reported patient LTC at [facility]. CM sent referral to inquire patient LTC/ MA bed hold at [facility]. CM did not recieve call back from patient's daugtherTunde. CM department to follow for further discharge planning needs.

## 2025-01-03 NOTE — PLAN OF CARE
Problem: PAIN - ADULT  Goal: Verbalizes/displays adequate comfort level or baseline comfort level  Description: Interventions:  - Encourage patient to monitor pain and request assistance  - Assess pain using appropriate pain scale  - Administer analgesics based on type and severity of pain and evaluate response  - Implement non-pharmacological measures as appropriate and evaluate response  - Consider cultural and social influences on pain and pain management  - Notify physician/advanced practitioner if interventions unsuccessful or patient reports new pain  Outcome: Progressing     Problem: INFECTION - ADULT  Goal: Absence or prevention of progression during hospitalization  Description: INTERVENTIONS:  - Assess and monitor for signs and symptoms of infection  - Monitor lab/diagnostic results  - Monitor all insertion sites, i.e. indwelling lines, tubes, and drains  - Monitor endotracheal if appropriate and nasal secretions for changes in amount and color  - [place] appropriate cooling/warming therapies per order  - Administer medications as ordered  - Instruct and encourage patient and family to use good hand hygiene technique  - Identify and instruct in appropriate isolation precautions for identified infection/condition  Outcome: Progressing     Problem: SAFETY ADULT  Goal: Patient will remain free of falls  Description: INTERVENTIONS:  - Educate patient/family on patient safety including physical limitations  - Instruct patient to call for assistance with activity   - Consult OT/PT to assist with strengthening/mobility   - Keep Call bell within reach  - Keep bed low and locked with side rails adjusted as appropriate  - Keep care items and personal belongings within reach  - Initiate and maintain comfort rounds  - Make Fall Risk Sign visible to staff  - Offer Toileting every 2 Hours, in advance of need  - Initiate/Maintain bed alarm  - Obtain necessary fall risk management equipment: bed alarms   - Apply  yellow socks and bracelet for high fall risk patients  - Consider moving patient to room near nurses station  Outcome: Progressing  Goal: Maintain or return to baseline ADL function  Description: INTERVENTIONS:  -  Assess patient's ability to carry out ADLs; assess patient's baseline for ADL function and identify physical deficits which impact ability to perform ADLs (bathing, care of mouth/teeth, toileting, grooming, dressing, etc.)  - Assess/evaluate cause of self-care deficits   - Assess range of motion  - Assess patient's mobility; develop plan if impaired  - Assess patient's need for assistive devices and provide as appropriate  - Encourage maximum independence but intervene and supervise when necessary  - Involve family in performance of ADLs  - Assess for home care needs following discharge   - Consider OT consult to assist with ADL evaluation and planning for discharge  - Provide patient education as appropriate  Outcome: Progressing  Goal: Maintains/Returns to pre admission functional level  Description: INTERVENTIONS:  - Perform AM-PAC 6 Click Basic Mobility/ Daily Activity assessment daily.  - Set and communicate daily mobility goal to care team and patient/family/caregiver.   - Collaborate with rehabilitation services on mobility goals if consulted  - Perform Range of Motion 4 times a day.  - Reposition patient every 2 hours.  - Dangle patient 3 times a day  - Stand patient 3 times a day  - Ambulate patient 3 times a day  - Out of bed to chair 3 times a day   - Out of bed for meals 3 times a day  - Out of bed for toileting  - Record patient progress and toleration of activity level   Outcome: Progressing     Problem: DISCHARGE PLANNING  Goal: Discharge to home or other facility with appropriate resources  Description: INTERVENTIONS:  - Identify barriers to discharge w/patient and caregiver  - Arrange for needed discharge resources and transportation as appropriate  - Identify discharge learning needs  (meds, wound care, etc.)  - Arrange for interpretive services to assist at discharge as needed  - Refer to Case Management Department for coordinating discharge planning if the patient needs post-hospital services based on physician/advanced practitioner order or complex needs related to functional status, cognitive ability, or social support system  Outcome: Progressing     Problem: Knowledge Deficit  Goal: Patient/family/caregiver demonstrates understanding of disease process, treatment plan, medications, and discharge instructions  Description: Complete learning assessment and assess knowledge base.  Interventions:  - Provide teaching at level of understanding  - Provide teaching via preferred learning methods  Outcome: Progressing     Problem: SKIN/TISSUE INTEGRITY - ADULT  Goal: Skin Integrity remains intact(Skin Breakdown Prevention)  Description: Assess:  -Perform Ignacio assessment every shift  -Clean and moisturize skin every shift  -Inspect skin when repositioning, toileting, and assisting with ADLS  -Assess under medical devices such as Masimo every shift  -Assess extremities for adequate circulation and sensation     Bed Management:  -Have minimal linens on bed & keep smooth, unwrinkled  -Change linens as needed when moist or perspiring  -Avoid sitting or lying in one position for more than 1 hours while in bed  -Keep HOB at 30 degrees     Toileting:  -Offer bedside commode  -Assess for incontinence every shift  -Use incontinent care products after each incontinent episode such as shield wipes     Activity:  -Mobilize patient 3 times a day  -Encourage activity and walks on unit  -Encourage or provide ROM exercises   -Turn and reposition patient every 2 Hours  -Use appropriate equipment to lift or move patient in bed  -Instruct/ Assist with weight shifting every 1 hour when out of bed in chair  -Consider limitation of chair time 1 hour intervals    Skin Care:  -Avoid use of baby powder, tape, friction and  shearing, hot water or constrictive clothing  -Relieve pressure over bony prominences using wedges   -Do not massage red bony areas    Next Steps:  -Teach patient strategies to minimize risks such as skin breakdown   -Consider consults to  interdisciplinary teams such as wound care nurse   Outcome: Progressing  Goal: Incision(s), wounds(s) or drain site(s) healing without S/S of infection  Description: INTERVENTIONS  - Assess and document dressing, incision, wound bed, drain sites and surrounding tissue  - Provide patient and family education  - Perform skin care/dressing changes every shift  Outcome: Progressing     Problem: HEMATOLOGIC - ADULT  Goal: Maintains hematologic stability  Description: INTERVENTIONS  - Assess for signs and symptoms of bleeding or hemorrhage  - Monitor labs  - Administer supportive blood products/factors as ordered and appropriate  Outcome: Progressing     Problem: MUSCULOSKELETAL - ADULT  Goal: Maintain or return mobility to safest level of function  Description: INTERVENTIONS:  - Assess patient's ability to carry out ADLs; assess patient's baseline for ADL function and identify physical deficits which impact ability to perform ADLs (bathing, care of mouth/teeth, toileting, grooming, dressing, etc.)  - Assess/evaluate cause of self-care deficits   - Assess range of motion  - Assess patient's mobility  - Assess patient's need for assistive devices and provide as appropriate  - Encourage maximum independence but intervene and supervise when necessary  - Involve family in performance of ADLs  - Assess for home care needs following discharge   - Consider OT consult to assist with ADL evaluation and planning for discharge  - Provide patient education as appropriate  Outcome: Progressing  Goal: Maintain proper alignment of affected body part  Description: INTERVENTIONS:  - Support, maintain and protect limb and body alignment  - Provide patient/ family with appropriate education  Outcome:  Progressing     Problem: Prexisting or High Potential for Compromised Skin Integrity  Goal: Skin integrity is maintained or improved  Description: INTERVENTIONS:  - Identify patients at risk for skin breakdown  - Assess and monitor skin integrity  - Assess and monitor nutrition and hydration status  - Monitor labs   - Assess for incontinence   - Turn and reposition patient  - Assist with mobility/ambulation  - Relieve pressure over bony prominences  - Avoid friction and shearing  - Provide appropriate hygiene as needed including keeping skin clean and dry  - Evaluate need for skin moisturizer/barrier cream  - Collaborate with interdisciplinary team   - Patient/family teaching  - Consider wound care consult   Outcome: Progressing

## 2025-01-03 NOTE — CONSULTS
Consultation - Orthopedics   Name: Samantha Rodriguez 59 y.o. female I MRN: [id number]  Unit/Bed#: [place] [address] -01 I Date of Admission: 1/2/2025   Date of Service: 1/3/2025 I Hospital Day: 0   Inpatient consult to Orthopedic Surgery  Consult performed by: Nedra Monteiro PA-C  Consult ordered by: Vitor Deluca DO        Physician Requesting Evaluation: Vitor Deluca DO   Reason for Evaluation / Principal Problem: Ambulatory dysfunction    Assessment & Plan  Ambulatory dysfunction  2 weeks s/p removal hardware L2-S1, posterior fusion L5-S1, revision instrumentation L2-pelvis, bilateral S2 alar screws with Dr. Bills and Dr. Stevens.  ESR, CRP elevated at 81 and 231.1   Will initiate ancef q8h  Continue local wound care for incision site. Cover with Abd, change daily and as needed.  LSO brace when OOB  PT/OT    Lumbar radiculopathy    SIRS (systemic inflammatory response syndrome) ([facility])    Orthopedics service will follow.    History of Present Illness   HPI: Samantha Rodriguez is a 59 y.o. year old female who presents with ambulatory dysfunction s/p removal hardware L2-S1, posterior fusion L5-S1, revision instrumentation L2-pelvis, bilateral S2 alar screws with Dr. Bills and Dr. Stevens on 12/19/24. Patient reports having difficulty ambulating and pain in the back and and legs since being discharged from rehab. She presented to the ED on 1/2 for her symptoms. She denies any sudden change or progression of her symptoms. She reports having difficulty using the stairs at home and having to slide down the stairs on a mattress. She also reports a fall a few days ago when trying to sit on her bed and missing the edge of the bed. She reports numbness in the medial thighs. Denies any bowel or bladder incontinence.       Review of Systems significant for findings described in the HPI.  I have reviewed the patient's PMH, PSH, Social History, Family History, Meds, and Allergies    Objective :  Temp:  [97.9 °F (36.6  "°C)-101.8 °F (38.8 °C)] 98.7 °F (37.1 °C)  HR:  [72-93] 87  BP: (105-157)/(61-85) 105/61  Resp:  [16-18] 16  SpO2:  [94 %-100 %] 99 %  O2 Device: None (Room air)  Physical Exam  Musculoskeletal: BACK  Steri strips and Abd in place over incision site. Positive drainage and sacral wound just distal to incision  Positive sona-incisional tenderness  TTP left hip and thigh  Left motor: Hip flexion 3/5, knee extension 3/5, knee flexion 4/5, dorsiflexion 3/5, plantar flexion 4/5, EHL 3/5  Right motor: Hip flexion 4/5, knee extension 4/5, knee flexion 4/5, dorsiflexion 3/5, plantarflexion 4/5, EHL 3/5  Sensation intact L2-S1 bilateral lower extremities  negative straight leg raise  2+ DP pulses bilaterally      Lab Results: I have reviewed the following results:   Recent Labs     01/02/25  2200 01/03/25  0315 01/03/25  1829   WBC 11.19* 9.50  --    HGB 10.5* 9.3*  --    HCT 32.7* 28.7*  --    * 405*  --    BANDSPCT 1  --   --    BUN 8 10  --    CREATININE 0.69 0.92  --    ESR  --   --  81*   CRP  --   --  231.1*     Blood Culture:   Lab Results   Component Value Date    BLOODCX Received in Microbiology Lab. Culture in Progress. 01/03/2025    BLOODCX Received in Microbiology Lab. Culture in Progress. 01/03/2025     Wound Culture: No results found for: \"WOUNDCULT\"    Imaging Results Review: I reviewed radiology reports from this admission including: CT lumbar spine.  Other Study Results Review: No additional pertinent studies reviewed.  "

## 2025-01-03 NOTE — ASSESSMENT & PLAN NOTE
Status post removal L2-S1 hardware, with new L5-S1 fusion performed by Dr. Stevens on 12/19 without complication  Pt reports following procedure and rehab she has continued to have pain limiting her ambulation  She denies any lower extremity numbness/weakness, saddle parenthesis, bowel or bladder retention/incontinence   She reporting taking oral morphine at home with minimal relief  Supportive care, pain control prn, scheduled tylenol

## 2025-01-04 PROBLEM — S31.000A WOUND OF SACRAL REGION: Status: ACTIVE | Noted: 2025-01-04

## 2025-01-04 PROBLEM — J20.5 RSV BRONCHITIS: Status: ACTIVE | Noted: 2025-01-04

## 2025-01-04 LAB
ANION GAP SERPL CALCULATED.3IONS-SCNC: 8 MMOL/L (ref 4–13)
BUN SERPL-MCNC: 15 MG/DL (ref 5–25)
CALCIUM SERPL-MCNC: 8.4 MG/DL (ref 8.4–10.2)
CHLORIDE SERPL-SCNC: 103 MMOL/L (ref 96–108)
CO2 SERPL-SCNC: 26 MMOL/L (ref 21–32)
CREAT SERPL-MCNC: 0.98 MG/DL (ref 0.6–1.3)
CRP SERPL QL: 224.7 MG/L
ERYTHROCYTE [DISTWIDTH] IN BLOOD BY AUTOMATED COUNT: 14.6 % (ref 11.6–15.1)
GFR SERPL CREATININE-BSD FRML MDRD: 62 ML/MIN/1.73SQ M
GLUCOSE SERPL-MCNC: 113 MG/DL (ref 65–140)
GLUCOSE SERPL-MCNC: 119 MG/DL (ref 65–140)
HCT VFR BLD AUTO: 30.9 % (ref 34.8–46.1)
HGB BLD-MCNC: 9.7 G/DL (ref 11.5–15.4)
MCH RBC QN AUTO: 27.8 PG (ref 26.8–34.3)
MCHC RBC AUTO-ENTMCNC: 31.4 G/DL (ref 31.4–37.4)
MCV RBC AUTO: 89 FL (ref 82–98)
PLATELET # BLD AUTO: 456 THOUSANDS/UL (ref 149–390)
PMV BLD AUTO: 9.7 FL (ref 8.9–12.7)
POTASSIUM SERPL-SCNC: 3.7 MMOL/L (ref 3.5–5.3)
RBC # BLD AUTO: 3.49 MILLION/UL (ref 3.81–5.12)
SODIUM SERPL-SCNC: 137 MMOL/L (ref 135–147)
WBC # BLD AUTO: 6.61 THOUSAND/UL (ref 4.31–10.16)

## 2025-01-04 PROCEDURE — 86140 C-REACTIVE PROTEIN: CPT

## 2025-01-04 PROCEDURE — 85027 COMPLETE CBC AUTOMATED: CPT | Performed by: INTERNAL MEDICINE

## 2025-01-04 PROCEDURE — 99024 POSTOP FOLLOW-UP VISIT: CPT

## 2025-01-04 PROCEDURE — 80048 BASIC METABOLIC PNL TOTAL CA: CPT | Performed by: INTERNAL MEDICINE

## 2025-01-04 PROCEDURE — 99233 SBSQ HOSP IP/OBS HIGH 50: CPT | Performed by: INTERNAL MEDICINE

## 2025-01-04 PROCEDURE — 82948 REAGENT STRIP/BLOOD GLUCOSE: CPT

## 2025-01-04 RX ORDER — GUAIFENESIN/DEXTROMETHORPHAN 100-10MG/5
10 SYRUP ORAL 3 TIMES DAILY
Status: DISCONTINUED | OUTPATIENT
Start: 2025-01-04 | End: 2025-01-09 | Stop reason: HOSPADM

## 2025-01-04 RX ORDER — AMOXICILLIN 250 MG
1 CAPSULE ORAL 2 TIMES DAILY
Status: DISCONTINUED | OUTPATIENT
Start: 2025-01-04 | End: 2025-01-07

## 2025-01-04 RX ORDER — IPRATROPIUM BROMIDE AND ALBUTEROL SULFATE 2.5; .5 MG/3ML; MG/3ML
3 SOLUTION RESPIRATORY (INHALATION) EVERY 6 HOURS PRN
Status: DISCONTINUED | OUTPATIENT
Start: 2025-01-04 | End: 2025-01-09 | Stop reason: HOSPADM

## 2025-01-04 RX ORDER — POLYETHYLENE GLYCOL 3350 17 G/17G
17 POWDER, FOR SOLUTION ORAL DAILY
Status: DISCONTINUED | OUTPATIENT
Start: 2025-01-04 | End: 2025-01-08

## 2025-01-04 RX ADMIN — LUBIPROSTONE 8 MCG: 8 CAPSULE, GELATIN COATED ORAL at 09:18

## 2025-01-04 RX ADMIN — SERTRALINE HYDROCHLORIDE 200 MG: 100 TABLET ORAL at 21:35

## 2025-01-04 RX ADMIN — BUSPIRONE HYDROCHLORIDE 15 MG: 10 TABLET ORAL at 09:18

## 2025-01-04 RX ADMIN — PANTOPRAZOLE SODIUM 40 MG: 40 TABLET, DELAYED RELEASE ORAL at 05:20

## 2025-01-04 RX ADMIN — PREGABALIN 150 MG: 75 CAPSULE ORAL at 09:18

## 2025-01-04 RX ADMIN — ARIPIPRAZOLE 10 MG: 10 TABLET ORAL at 09:18

## 2025-01-04 RX ADMIN — ATORVASTATIN CALCIUM 40 MG: 40 TABLET, FILM COATED ORAL at 17:11

## 2025-01-04 RX ADMIN — ACETAMINOPHEN 975 MG: 325 TABLET, FILM COATED ORAL at 05:20

## 2025-01-04 RX ADMIN — PREGABALIN 150 MG: 75 CAPSULE ORAL at 21:35

## 2025-01-04 RX ADMIN — SENNOSIDES AND DOCUSATE SODIUM 1 TABLET: 50; 8.6 TABLET ORAL at 17:11

## 2025-01-04 RX ADMIN — ACETAMINOPHEN 975 MG: 325 TABLET, FILM COATED ORAL at 21:35

## 2025-01-04 RX ADMIN — HYDROXYZINE HYDROCHLORIDE 25 MG: 25 TABLET ORAL at 17:11

## 2025-01-04 RX ADMIN — BUSPIRONE HYDROCHLORIDE 15 MG: 10 TABLET ORAL at 21:35

## 2025-01-04 RX ADMIN — ALUMINUM HYDROXIDE, MAGNESIUM HYDROXIDE, AND DIMETHICONE 30 ML: 200; 20; 200 SUSPENSION ORAL at 09:17

## 2025-01-04 RX ADMIN — ACETAMINOPHEN 975 MG: 325 TABLET, FILM COATED ORAL at 14:11

## 2025-01-04 RX ADMIN — HYDROXYZINE HYDROCHLORIDE 25 MG: 25 TABLET ORAL at 21:36

## 2025-01-04 RX ADMIN — ASPIRIN 81 MG: 81 TABLET, COATED ORAL at 09:18

## 2025-01-04 RX ADMIN — LUBIPROSTONE 8 MCG: 8 CAPSULE, GELATIN COATED ORAL at 21:36

## 2025-01-04 RX ADMIN — OXYCODONE HYDROCHLORIDE 10 MG: 10 TABLET ORAL at 13:00

## 2025-01-04 RX ADMIN — LIDOCAINE 1 PATCH: 50 PATCH TOPICAL at 21:37

## 2025-01-04 RX ADMIN — POLYETHYLENE GLYCOL 3350 17 G: 17 POWDER, FOR SOLUTION ORAL at 17:10

## 2025-01-04 RX ADMIN — CEFAZOLIN SODIUM 2000 MG: 2 SOLUTION INTRAVENOUS at 14:12

## 2025-01-04 RX ADMIN — CEFAZOLIN SODIUM 2000 MG: 2 SOLUTION INTRAVENOUS at 21:37

## 2025-01-04 RX ADMIN — ENOXAPARIN SODIUM 40 MG: 40 INJECTION SUBCUTANEOUS at 09:17

## 2025-01-04 RX ADMIN — BUSPIRONE HYDROCHLORIDE 15 MG: 10 TABLET ORAL at 17:11

## 2025-01-04 RX ADMIN — GUAIFENESIN AND DEXTROMETHORPHAN 10 ML: 100; 10 SYRUP ORAL at 21:36

## 2025-01-04 RX ADMIN — AMLODIPINE BESYLATE 5 MG: 5 TABLET ORAL at 09:18

## 2025-01-04 RX ADMIN — SENNOSIDES AND DOCUSATE SODIUM 1 TABLET: 50; 8.6 TABLET ORAL at 21:39

## 2025-01-04 RX ADMIN — HYDROMORPHONE HYDROCHLORIDE 0.5 MG: 1 INJECTION, SOLUTION INTRAMUSCULAR; INTRAVENOUS; SUBCUTANEOUS at 14:11

## 2025-01-04 RX ADMIN — PREGABALIN 150 MG: 75 CAPSULE ORAL at 17:11

## 2025-01-04 RX ADMIN — GUAIFENESIN AND DEXTROMETHORPHAN 10 ML: 100; 10 SYRUP ORAL at 17:10

## 2025-01-04 RX ADMIN — HYDROXYZINE HYDROCHLORIDE 25 MG: 25 TABLET ORAL at 09:18

## 2025-01-04 RX ADMIN — CEFAZOLIN SODIUM 2000 MG: 2 SOLUTION INTRAVENOUS at 05:20

## 2025-01-04 NOTE — PLAN OF CARE
Problem: PAIN - ADULT  Goal: Verbalizes/displays adequate comfort level or baseline comfort level  Description: Interventions:  - Encourage patient to monitor pain and request assistance  - Assess pain using appropriate pain scale  - Administer analgesics based on type and severity of pain and evaluate response  - Implement non-pharmacological measures as appropriate and evaluate response  - Consider cultural and social influences on pain and pain management  - Notify physician/advanced practitioner if interventions unsuccessful or patient reports new pain  Outcome: Progressing     Problem: INFECTION - ADULT  Goal: Absence or prevention of progression during hospitalization  Description: INTERVENTIONS:  - Assess and monitor for signs and symptoms of infection  - Monitor lab/diagnostic results  - Monitor all insertion sites, i.e. indwelling lines, tubes, and drains  - Monitor endotracheal if appropriate and nasal secretions for changes in amount and color  - [place] appropriate cooling/warming therapies per order  - Administer medications as ordered  - Instruct and encourage patient and family to use good hand hygiene technique  - Identify and instruct in appropriate isolation precautions for identified infection/condition  Outcome: Progressing     Problem: SAFETY ADULT  Goal: Patient will remain free of falls  Description: INTERVENTIONS:  - Educate patient/family on patient safety including physical limitations  - Instruct patient to call for assistance with activity   - Consult OT/PT to assist with strengthening/mobility   - Keep Call bell within reach  - Keep bed low and locked with side rails adjusted as appropriate  - Keep care items and personal belongings within reach  - Initiate and maintain comfort rounds  - Make Fall Risk Sign visible to staff  - Offer Toileting every 2 Hours, in advance of need  - Initiate/Maintain bed alarm  - Obtain necessary fall risk management equipment: bed alarms   - Apply  yellow socks and bracelet for high fall risk patients  - Consider moving patient to room near nurses station  Outcome: Progressing  Goal: Maintain or return to baseline ADL function  Description: INTERVENTIONS:  -  Assess patient's ability to carry out ADLs; assess patient's baseline for ADL function and identify physical deficits which impact ability to perform ADLs (bathing, care of mouth/teeth, toileting, grooming, dressing, etc.)  - Assess/evaluate cause of self-care deficits   - Assess range of motion  - Assess patient's mobility; develop plan if impaired  - Assess patient's need for assistive devices and provide as appropriate  - Encourage maximum independence but intervene and supervise when necessary  - Involve family in performance of ADLs  - Assess for home care needs following discharge   - Consider OT consult to assist with ADL evaluation and planning for discharge  - Provide patient education as appropriate  Outcome: Progressing  Goal: Maintains/Returns to pre admission functional level  Description: INTERVENTIONS:  - Perform AM-PAC 6 Click Basic Mobility/ Daily Activity assessment daily.  - Set and communicate daily mobility goal to care team and patient/family/caregiver.   - Collaborate with rehabilitation services on mobility goals if consulted  - Perform Range of Motion 4 times a day.  - Reposition patient every 2 hours.  - Dangle patient 3 times a day  - Stand patient 3 times a day  - Ambulate patient 3 times a day  - Out of bed to chair 3 times a day   - Out of bed for meals 3 times a day  - Out of bed for toileting  - Record patient progress and toleration of activity level   Outcome: Progressing     Problem: DISCHARGE PLANNING  Goal: Discharge to home or other facility with appropriate resources  Description: INTERVENTIONS:  - Identify barriers to discharge w/patient and caregiver  - Arrange for needed discharge resources and transportation as appropriate  - Identify discharge learning needs  (meds, wound care, etc.)  - Arrange for interpretive services to assist at discharge as needed  - Refer to Case Management Department for coordinating discharge planning if the patient needs post-hospital services based on physician/advanced practitioner order or complex needs related to functional status, cognitive ability, or social support system  Outcome: Progressing     Problem: Knowledge Deficit  Goal: Patient/family/caregiver demonstrates understanding of disease process, treatment plan, medications, and discharge instructions  Description: Complete learning assessment and assess knowledge base.  Interventions:  - Provide teaching at level of understanding  - Provide teaching via preferred learning methods  Outcome: Progressing     Problem: SKIN/TISSUE INTEGRITY - ADULT  Goal: Skin Integrity remains intact(Skin Breakdown Prevention)  Description: Assess:  -Perform Ignacio assessment every shift  -Clean and moisturize skin every shift  -Inspect skin when repositioning, toileting, and assisting with ADLS  -Assess under medical devices such as Masimo every shift  -Assess extremities for adequate circulation and sensation     Bed Management:  -Have minimal linens on bed & keep smooth, unwrinkled  -Change linens as needed when moist or perspiring  -Avoid sitting or lying in one position for more than 1 hours while in bed  -Keep HOB at 30 degrees     Toileting:  -Offer bedside commode  -Assess for incontinence every shift  -Use incontinent care products after each incontinent episode such as shield wipes     Activity:  -Mobilize patient 3 times a day  -Encourage activity and walks on unit  -Encourage or provide ROM exercises   -Turn and reposition patient every 2 Hours  -Use appropriate equipment to lift or move patient in bed  -Instruct/ Assist with weight shifting every 1 hour when out of bed in chair  -Consider limitation of chair time 1 hour intervals    Skin Care:  -Avoid use of baby powder, tape, friction and  shearing, hot water or constrictive clothing  -Relieve pressure over bony prominences using wedges   -Do not massage red bony areas    Next Steps:  -Teach patient strategies to minimize risks such as skin breakdown   -Consider consults to  interdisciplinary teams such as wound care nurse   Outcome: Progressing  Goal: Incision(s), wounds(s) or drain site(s) healing without S/S of infection  Description: INTERVENTIONS  - Assess and document dressing, incision, wound bed, drain sites and surrounding tissue  - Provide patient and family education  - Perform skin care/dressing changes every shift  Outcome: Progressing     Problem: HEMATOLOGIC - ADULT  Goal: Maintains hematologic stability  Description: INTERVENTIONS  - Assess for signs and symptoms of bleeding or hemorrhage  - Monitor labs  - Administer supportive blood products/factors as ordered and appropriate  Outcome: Progressing     Problem: MUSCULOSKELETAL - ADULT  Goal: Maintain or return mobility to safest level of function  Description: INTERVENTIONS:  - Assess patient's ability to carry out ADLs; assess patient's baseline for ADL function and identify physical deficits which impact ability to perform ADLs (bathing, care of mouth/teeth, toileting, grooming, dressing, etc.)  - Assess/evaluate cause of self-care deficits   - Assess range of motion  - Assess patient's mobility  - Assess patient's need for assistive devices and provide as appropriate  - Encourage maximum independence but intervene and supervise when necessary  - Involve family in performance of ADLs  - Assess for home care needs following discharge   - Consider OT consult to assist with ADL evaluation and planning for discharge  - Provide patient education as appropriate  Outcome: Progressing  Goal: Maintain proper alignment of affected body part  Description: INTERVENTIONS:  - Support, maintain and protect limb and body alignment  - Provide patient/ family with appropriate education  Outcome:  Progressing     Problem: Prexisting or High Potential for Compromised Skin Integrity  Goal: Skin integrity is maintained or improved  Description: INTERVENTIONS:  - Identify patients at risk for skin breakdown  - Assess and monitor skin integrity  - Assess and monitor nutrition and hydration status  - Monitor labs   - Assess for incontinence   - Turn and reposition patient  - Assist with mobility/ambulation  - Relieve pressure over bony prominences  - Avoid friction and shearing  - Provide appropriate hygiene as needed including keeping skin clean and dry  - Evaluate need for skin moisturizer/barrier cream  - Collaborate with interdisciplinary team   - Patient/family teaching  - Consider wound care consult   Outcome: Progressing

## 2025-01-04 NOTE — ASSESSMENT & PLAN NOTE
2 weeks s/p removal hardware L2-S1, posterior fusion L5-S1, revision instrumentation L2-pelvis, bilateral S2 alar screws with Dr. Bills and Dr. Stevens.  ESR, CRP elevated at 81 and 231.1 on 1/3. Will continue to trend.   Started on ancef q8h  Continue local wound care for incision site. Cover with Abd, change daily and as needed.  LSO brace when OOB  PT/OT  XR left hip negative for fracture or dislocation.

## 2025-01-04 NOTE — PROGRESS NOTES
"Progress Note - Orthopedics   Name: Samantha Rodriguez 59 y.o. female I MRN: [id number]  Unit/Bed#: [place] [address] -01 I Date of Admission: 1/2/2025   Date of Service: 1/4/2025 I Hospital Day: 1    Assessment & Plan  Ambulatory dysfunction  2 weeks s/p removal hardware L2-S1, posterior fusion L5-S1, revision instrumentation L2-pelvis, bilateral S2 alar screws with Dr. Bills and Dr. Stevens.  ESR, CRP elevated at 81 and 231.1 on 1/3. Will continue to trend.   Started on ancef q8h  Continue local wound care for incision site. Cover with Abd, change daily and as needed.  LSO brace when OOB  PT/OT  XR left hip negative for fracture or dislocation.     Lumbar radiculopathy    SIRS (systemic inflammatory response syndrome) (HCC)      Orthopedics service will follow.    Subjective   59 y.o.female seen and evaluated at bedside, resting comfortably in bed at time of exam. She reports feeling sweaty this morning, but room temp was noted to be 78 degrees. Reports continued pain in the back and left hip with some relief with meds. Denies chills, CP, SOB, N/V, numbness or tingling. Denies issues with urination or bowel movements. She is concerned that she continues to have difficulty walking.     Objective :  /71   Pulse 89   Temp (!) 100.7 °F (38.2 °C)   Resp 18   Ht 5' 1\" (1.549 m)   Wt 94.6 kg (208 lb 8.9 oz)   SpO2 98%   BMI 39.41 kg/m²   Gen: No acute distress, resting comfortably in bed  HEENT: Eyes clear, moist mucus membranes, hearing intact  Respiratory: No audible wheezing or stridor  Cardiovascular: Well Perfused peripherally, 2+ distal pulse  Abdomen: nondistended, no peritoneal signs  Musculoskeletal: bilateral lower extremity  Steri strips and Abd in place over incision site. Minimal drainage on Abd with sacral wound just distal to incision  Positive sona-incisional tenderness  TTP left hip and thigh  Left motor: Hip flexion 3/5, knee extension 3/5, knee flexion 4/5, dorsiflexion 3/5, plantar flexion " "4/5, EHL 3/5  Right motor: Hip flexion 4/5, knee extension 4/5, knee flexion 4/5, dorsiflexion 3/5, plantarflexion 4/5, EHL 3/5  Sensation intact L2-S1 bilateral lower extremities  2+ DP pulses bilaterally      Lab Results: I have reviewed the following results:  Recent Labs     01/02/25  2200 01/03/25  0315 01/03/25  1829 01/04/25  0539   WBC 11.19* 9.50  --  6.61   HGB 10.5* 9.3*  --  9.7*   HCT 32.7* 28.7*  --  30.9*   * 405*  --  456*   BANDSPCT 1  --   --   --    BUN 8 10  --  15   CREATININE 0.69 0.92  --  0.98   ESR  --   --  81*  --    CRP  --   --  231.1*  --      Blood Culture:    Lab Results   Component Value Date    BLOODCX No Growth at 24 hrs. 01/03/2025    BLOODCX No Growth at 24 hrs. 01/03/2025     Wound Culture: No results found for: \"WOUNDCULT\"  "

## 2025-01-04 NOTE — PROGRESS NOTES
Progress Note - Hospitalist   Name: Samantha Rodriguez 59 y.o. female I MRN: [id number]  Unit/Bed#: [place] [address] -01 I Date of Admission: 1/2/2025   Date of Service: 1/4/2025 I Hospital Day: 1    Assessment & Plan  Ambulatory dysfunction  Patient reports after getting back from rehab she has been struggling to walk secondary to her back pain  She typically ambulates with a walker  Continue PT/OT  Lumbar radiculopathy  Status post removal L2-S1 hardware, with new L5-S1 fusion performed by Dr. Stevens on 12/19 without complication  Pt reports following procedure and rehab she has continued to have pain limiting her ambulation  She denies any lower extremity numbness/weakness, saddle parenthesis, bowel or bladder retention/incontinence   Reviewed CT scan with contrast done on admission with radiologist which did not show any central canal narrowing  Pain control with oxycodone as she was not getting any relief with home morphine  PT/OT  SIRS (systemic inflammatory response syndrome) (HCC)  SIRS suspected secondary to RSV infection, less likely due to superficial cellulitis  Blood cultures negative at 24 hours  Trend fever/WBC curve  Essential hypertension  Continue amlodipine and prazosin  Bipolar disorder (HCC)  Mood stable  Continue abilify, zoloft, buspar  Wound of sacral region  Sacral wound distal to previous incision.  Orthopedist does not feel represents dehiscence but more likely sacral wound.  Plan to place Prevena VAC  Empiric course of antibiotics with IV Ancef to treat possibly early superficial infection around that she has been site  RSV bronchitis  Supportive care   Mucinex DM  Nebs as needed    VTE Pharmacologic Prophylaxis: lovenox     Patient Centered Rounds:  Patient care rounds were performed with nursing    Discussions with Specialists or Other Care Team Provider: Independently reviewed case with orthopedics    Education and Discussions with Family / Patient: Called Laina phone approx [phone]  with no answer     Time Spent for Care: I have spent a total time of 56 minutes on 25 in caring for this patient including Diagnostic results, Risks and benefits of tx options, Instructions for management, Impressions, Counseling / Coordination of care, Documenting in the medical record, Reviewing / ordering tests, medicine, procedures  , and Communicating with other healthcare professionals .      Current Length of Stay: 1 day(s)    Current Patient Status: Inpatient   Certification Statement: The patient will continue to require additional inpatient hospital stay due to management of SIRS secondary to RSV, ambulatory dysfunction    Discharge Plan: 48 hours    Code Status: Level 1 - Full Code      Subjective:   Patient seen and evaluated at bedside.  She does not feel well.  Feels sick.    Objective:     Vitals:   Temp (24hrs), Av.3 °F (37.9 °C), Min:99.2 °F (37.3 °C), Max:101.1 °F (38.4 °C)    Temp:  [99.2 °F (37.3 °C)-101.1 °F (38.4 °C)] 99.2 °F (37.3 °C)  HR:  [79-96] 83  Resp:  [16-18] 16  BP: (118-126)/(71-81) 118/81  SpO2:  [94 %-98 %] 97 %  Body mass index is 39.41 kg/m².     Input and Output Summary (last 24 hours):       Intake/Output Summary (Last 24 hours) at 2025 1626  Last data filed at 2025 1119  Gross per 24 hour   Intake 540 ml   Output 500 ml   Net 40 ml       Physical Exam:     Physical Exam  Vitals reviewed.   Constitutional:       General: She is not in acute distress.     Appearance: She is well-developed. She is diaphoretic. She is not ill-appearing or toxic-appearing.   HENT:      Head: Normocephalic and atraumatic.      Mouth/Throat:      Mouth: Mucous membranes are moist.   Eyes:      General: No scleral icterus.     Extraocular Movements: Extraocular movements intact.   Cardiovascular:      Rate and Rhythm: Normal rate and regular rhythm.      Heart sounds: Normal heart sounds.   Pulmonary:      Effort: Pulmonary effort is normal. No respiratory distress.      Breath  sounds: No wheezing or rales.      Comments: Coarse breath sounds bilaterally, faint scattered wheeze  Abdominal:      General: There is no distension.      Palpations: Abdomen is soft.      Tenderness: There is no abdominal tenderness. There is no guarding or rebound.   Musculoskeletal:         General: No swelling, tenderness or deformity.   Skin:     General: Skin is warm.   Neurological:      General: No focal deficit present.      Mental Status: She is alert. Mental status is at baseline.   Psychiatric:         Mood and Affect: Mood normal.         Behavior: Behavior normal.         Thought Content: Thought content normal.         Judgment: Judgment normal.         Additional Data:     Labs: I have reviewed pertinent results     Results from last 7 days   Lab Units 01/04/25  0539 01/03/25  0315 01/02/25  2200   WBC Thousand/uL 6.61 9.50 11.19*   HEMOGLOBIN g/dL 9.7* 9.3* 10.5*   HEMATOCRIT % 30.9* 28.7* 32.7*   PLATELETS Thousands/uL 456* 405* 434*   BANDS PCT %  --   --  1   SEGS PCT %  --  81*  --    LYMPHO PCT %  --  9* 2*   MONO PCT %  --  8 7   EOS PCT %  --  1 0     Results from last 7 days   Lab Units 01/04/25  0539   SODIUM mmol/L 137   POTASSIUM mmol/L 3.7   CHLORIDE mmol/L 103   CO2 mmol/L 26   BUN mg/dL 15   CREATININE mg/dL 0.98   ANION GAP mmol/L 8   CALCIUM mg/dL 8.4   GLUCOSE RANDOM mg/dL 113                 Results from last 7 days   Lab Units 01/03/25  0315   LACTIC ACID mmol/L 0.8   PROCALCITONIN ng/ml 0.36*         Imaging: I have reviewed pertinent imaging       Recent Cultures (last 7 days):     Results from last 7 days   Lab Units 01/03/25  0626 01/03/25  0316   BLOOD CULTURE   --  No Growth at 24 hrs.  No Growth at 24 hrs.   URINE CULTURE  >100,000 cfu/ml Escherichia coli*  --        Last 24 Hours Medication List:   Current Facility-Administered Medications   Medication Dose Route Frequency Provider Last Rate    acetaminophen  975 mg Oral Q8H [facility] Humberto Smith PA-C       aluminum-magnesium hydroxide-simethicone  30 mL Oral Daily Humberto Smith, PA-C      amLODIPine  5 mg Oral Daily Humberto Smith, PA-C      ARIPiprazole  10 mg Oral Daily Humberto Smith, PA-C      aspirin  81 mg Oral Daily Humberto Smith, PA-C      atorvastatin  40 mg Oral Daily With Dinner Humberto Smith, PA-C      bisacodyl  10 mg Rectal Daily PRN Humberto Smith, PA-C      busPIRone  15 mg Oral TID Humberto Smith, PA-C      cefazolin  2,000 mg Intravenous Q8H Nedra Monteiro, PA-C 2,000 mg (01/04/25 1412)    cyclobenzaprine  10 mg Oral TID PRN Humberto Smith, PA-C      enoxaparin  40 mg Subcutaneous Daily Humberto Smith, PA-C      HYDROmorphone  0.5 mg Intravenous Q6H PRN Humberto Smith, PA-C      hydrOXYzine HCL  25 mg Oral TID Humberto Smith, PA-C      lidocaine  1 patch Topical Daily Humberto Smith, PA-DAMIAN      lubiprostone  8 mcg Oral BID Humberto Smith, PA-C      ondansetron  4 mg Intravenous Q6H PRN Humberto Smith, JACQUI      oxyCODONE  5 mg Oral Q4H PRN Vitor Deluca DO      Or    oxyCODONE  10 mg Oral Q4H PRN Vitor Deluca,       pantoprazole  40 mg Oral Early Morning Humberto Smith, JACQUI      pneumococcal 20-cheryl conj vacc  0.5 mL Intramuscular Once PRN Sanket Pearce MD      polyethylene glycol  17 g Oral Daily Vitor Deluca DO      prazosin  2 mg Oral HS Humberto Smith, JACQUI      pregabalin  150 mg Oral TID Humberto Smith, JACQUI      senna-docusate sodium  1 tablet Oral BID Vitor Deluca DO      sertraline  200 mg Oral HS Humberto Smith, JACQUI      Valbenazine Tosylate  1 capsule Oral Daily Humberto Smith, JACQUI          Today, Patient Was Seen By: Vitor Deluca DO    ** Please Note: Dictation voice to text software may have been used in the creation of this document. **

## 2025-01-04 NOTE — ASSESSMENT & PLAN NOTE
Sacral wound distal to previous incision.  Orthopedist does not feel represents dehiscence but more likely sacral wound.  Plan to place Prevena VAC  Empiric course of antibiotics with IV Ancef to treat possibly early superficial infection around that she has been site

## 2025-01-04 NOTE — ASSESSMENT & PLAN NOTE
2 weeks s/p removal hardware L2-S1, posterior fusion L5-S1, revision instrumentation L2-pelvis, bilateral S2 alar screws with Dr. Bills and Dr. Stevens.  ESR, CRP elevated at 81 and 231.1   Will initiate ancef q8h  Continue local wound care for incision site. Cover with Abd, change daily and as needed.  LSO brace when OOB  PT/OT

## 2025-01-04 NOTE — ASSESSMENT & PLAN NOTE
Patient reports after getting back from rehab she has been struggling to walk secondary to her back pain  She typically ambulates with a walker  Continue PT/OT

## 2025-01-04 NOTE — ASSESSMENT & PLAN NOTE
Status post removal L2-S1 hardware, with new L5-S1 fusion performed by Dr. Stevens on 12/19 without complication  Pt reports following procedure and rehab she has continued to have pain limiting her ambulation  She denies any lower extremity numbness/weakness, saddle parenthesis, bowel or bladder retention/incontinence   Reviewed CT scan with contrast done on admission with radiologist which did not show any central canal narrowing  Pain control with oxycodone as she was not getting any relief with home morphine  PT/OT

## 2025-01-04 NOTE — ASSESSMENT & PLAN NOTE
SIRS suspected secondary to RSV infection, less likely due to superficial cellulitis  Blood cultures negative at 24 hours  Trend fever/WBC curve

## 2025-01-04 NOTE — UTILIZATION REVIEW
Continued Stay Review    SEE INITIAL REVIEW AT BOTTOM    Date: 01/04                          Current Patient Class: Inpatient  Current Level of Care: MS    HPI:59 y.o. female initially admitted on 01/03     Assessment/Plan:   01/03   IM Notes: On exam, dehiscence of incision site some serosanguineous drainage on dressing, LE w/ decreased strength 3/5 bilaterally limited by pain as she was grimacing while attempting to lift legs. No gross sensory deficit. She had 2 different stimulators in her back and thus is not MRI safe.  CT scan without contrast from admission and there was no central canal narrowing  Continue supportive care with as needed analgesia. PT/OT. Ortho consulted.     Orthopedics Consult: Ambulatory Dysfunction:  Pt 2 weeks s/p removal hardware L2-S1, posterior fusion L5-S1, revision instrumentation L2-pelvis, bilateral S2 alar screws.  ESR, CRP elevated at 81 and 231.1   Plan: start Ancef.q8h. Cont LWC for incision site. LSO brace when OOB. PT/OT.      Date: 01/04  Day 3: Has surpassed a 2nd midnight with active treatments and services.  Pt reports feeling sweaty this am despite room temp of 78 degrees. Reports continued back and L hip,pain w/ some relief w/ meds. XR left hip negative for fracture or dislocation.   Cont to trend ESR, CRP. Cont Ancef. Cont LWC. LSO brace when OOB. PT/OT recommending level 2 rehab.

## 2025-01-04 NOTE — PLAN OF CARE
Problem: PAIN - ADULT  Goal: Verbalizes/displays adequate comfort level or baseline comfort level  Description: Interventions:  - Encourage patient to monitor pain and request assistance  - Assess pain using appropriate pain scale  - Administer analgesics based on type and severity of pain and evaluate response  - Implement non-pharmacological measures as appropriate and evaluate response  - Consider cultural and social influences on pain and pain management  - Notify physician/advanced practitioner if interventions unsuccessful or patient reports new pain  Outcome: Progressing     Problem: INFECTION - ADULT  Goal: Absence or prevention of progression during hospitalization  Description: INTERVENTIONS:  - Assess and monitor for signs and symptoms of infection  - Monitor lab/diagnostic results  - Monitor all insertion sites, i.e. indwelling lines, tubes, and drains  - Monitor endotracheal if appropriate and nasal secretions for changes in amount and color  - [place] appropriate cooling/warming therapies per order  - Administer medications as ordered  - Instruct and encourage patient and family to use good hand hygiene technique  - Identify and instruct in appropriate isolation precautions for identified infection/condition  Outcome: Progressing     Problem: SAFETY ADULT  Goal: Patient will remain free of falls  Description: INTERVENTIONS:  - Educate patient/family on patient safety including physical limitations  - Instruct patient to call for assistance with activity   - Consult OT/PT to assist with strengthening/mobility   - Keep Call bell within reach  - Keep bed low and locked with side rails adjusted as appropriate  - Keep care items and personal belongings within reach  - Initiate and maintain comfort rounds  - Make Fall Risk Sign visible to staff  - Offer Toileting every 2 Hours, in advance of need  - Initiate/Maintain bed alarm  - Obtain necessary fall risk management equipment: bed alarms   - Apply  yellow socks and bracelet for high fall risk patients  - Consider moving patient to room near nurses station  Outcome: Progressing  Goal: Maintain or return to baseline ADL function  Description: INTERVENTIONS:  -  Assess patient's ability to carry out ADLs; assess patient's baseline for ADL function and identify physical deficits which impact ability to perform ADLs (bathing, care of mouth/teeth, toileting, grooming, dressing, etc.)  - Assess/evaluate cause of self-care deficits   - Assess range of motion  - Assess patient's mobility; develop plan if impaired  - Assess patient's need for assistive devices and provide as appropriate  - Encourage maximum independence but intervene and supervise when necessary  - Involve family in performance of ADLs  - Assess for home care needs following discharge   - Consider OT consult to assist with ADL evaluation and planning for discharge  - Provide patient education as appropriate  Outcome: Progressing  Goal: Maintains/Returns to pre admission functional level  Description: INTERVENTIONS:  - Perform AM-PAC 6 Click Basic Mobility/ Daily Activity assessment daily.  - Set and communicate daily mobility goal to care team and patient/family/caregiver.   - Collaborate with rehabilitation services on mobility goals if consulted  - Perform Range of Motion 4 times a day.  - Reposition patient every 2 hours.  - Dangle patient 3 times a day  - Stand patient 3 times a day  - Ambulate patient 3 times a day  - Out of bed to chair 3 times a day   - Out of bed for meals 3 times a day  - Out of bed for toileting  - Record patient progress and toleration of activity level   Outcome: Progressing     Problem: DISCHARGE PLANNING  Goal: Discharge to home or other facility with appropriate resources  Description: INTERVENTIONS:  - Identify barriers to discharge w/patient and caregiver  - Arrange for needed discharge resources and transportation as appropriate  - Identify discharge learning needs  (meds, wound care, etc.)  - Arrange for interpretive services to assist at discharge as needed  - Refer to Case Management Department for coordinating discharge planning if the patient needs post-hospital services based on physician/advanced practitioner order or complex needs related to functional status, cognitive ability, or social support system  Outcome: Progressing     Problem: Knowledge Deficit  Goal: Patient/family/caregiver demonstrates understanding of disease process, treatment plan, medications, and discharge instructions  Description: Complete learning assessment and assess knowledge base.  Interventions:  - Provide teaching at level of understanding  - Provide teaching via preferred learning methods  Outcome: Progressing     Problem: SKIN/TISSUE INTEGRITY - ADULT  Goal: Skin Integrity remains intact(Skin Breakdown Prevention)  Description: Assess:  -Perform Ignacio assessment every shift  -Clean and moisturize skin every shift  -Inspect skin when repositioning, toileting, and assisting with ADLS  -Assess under medical devices such as Masimo every shift  -Assess extremities for adequate circulation and sensation     Bed Management:  -Have minimal linens on bed & keep smooth, unwrinkled  -Change linens as needed when moist or perspiring  -Avoid sitting or lying in one position for more than 1 hours while in bed  -Keep HOB at 30 degrees     Toileting:  -Offer bedside commode  -Assess for incontinence every shift  -Use incontinent care products after each incontinent episode such as shield wipes     Activity:  -Mobilize patient 3 times a day  -Encourage activity and walks on unit  -Encourage or provide ROM exercises   -Turn and reposition patient every 2 Hours  -Use appropriate equipment to lift or move patient in bed  -Instruct/ Assist with weight shifting every 1 hour when out of bed in chair  -Consider limitation of chair time 1 hour intervals    Skin Care:  -Avoid use of baby powder, tape, friction and  shearing, hot water or constrictive clothing  -Relieve pressure over bony prominences using wedges   -Do not massage red bony areas    Next Steps:  -Teach patient strategies to minimize risks such as skin breakdown   -Consider consults to  interdisciplinary teams such as wound care nurse   Outcome: Progressing  Goal: Incision(s), wounds(s) or drain site(s) healing without S/S of infection  Description: INTERVENTIONS  - Assess and document dressing, incision, wound bed, drain sites and surrounding tissue  - Provide patient and family education  - Perform skin care/dressing changes every shift  Outcome: Progressing     Problem: HEMATOLOGIC - ADULT  Goal: Maintains hematologic stability  Description: INTERVENTIONS  - Assess for signs and symptoms of bleeding or hemorrhage  - Monitor labs  - Administer supportive blood products/factors as ordered and appropriate  Outcome: Progressing     Problem: MUSCULOSKELETAL - ADULT  Goal: Maintain or return mobility to safest level of function  Description: INTERVENTIONS:  - Assess patient's ability to carry out ADLs; assess patient's baseline for ADL function and identify physical deficits which impact ability to perform ADLs (bathing, care of mouth/teeth, toileting, grooming, dressing, etc.)  - Assess/evaluate cause of self-care deficits   - Assess range of motion  - Assess patient's mobility  - Assess patient's need for assistive devices and provide as appropriate  - Encourage maximum independence but intervene and supervise when necessary  - Involve family in performance of ADLs  - Assess for home care needs following discharge   - Consider OT consult to assist with ADL evaluation and planning for discharge  - Provide patient education as appropriate  Outcome: Progressing  Goal: Maintain proper alignment of affected body part  Description: INTERVENTIONS:  - Support, maintain and protect limb and body alignment  - Provide patient/ family with appropriate education  Outcome:  Progressing     Problem: Prexisting or High Potential for Compromised Skin Integrity  Goal: Skin integrity is maintained or improved  Description: INTERVENTIONS:  - Identify patients at risk for skin breakdown  - Assess and monitor skin integrity  - Assess and monitor nutrition and hydration status  - Monitor labs   - Assess for incontinence   - Turn and reposition patient  - Assist with mobility/ambulation  - Relieve pressure over bony prominences  - Avoid friction and shearing  - Provide appropriate hygiene as needed including keeping skin clean and dry  - Evaluate need for skin moisturizer/barrier cream  - Collaborate with interdisciplinary team   - Patient/family teaching  - Consider wound care consult   Outcome: Progressing

## 2025-01-05 ENCOUNTER — TELEPHONE (OUTPATIENT)
Age: 60
End: 2025-01-05

## 2025-01-05 ENCOUNTER — APPOINTMENT (INPATIENT)
Dept: RADIOLOGY | Facility: HOSPITAL | Age: 60
End: 2025-01-05
Payer: MEDICARE

## 2025-01-05 LAB
ANION GAP SERPL CALCULATED.3IONS-SCNC: 7 MMOL/L (ref 4–13)
BACTERIA UR CULT: ABNORMAL
BACTERIA UR CULT: ABNORMAL
BUN SERPL-MCNC: 12 MG/DL (ref 5–25)
CALCIUM SERPL-MCNC: 8.2 MG/DL (ref 8.4–10.2)
CHLORIDE SERPL-SCNC: 102 MMOL/L (ref 96–108)
CO2 SERPL-SCNC: 28 MMOL/L (ref 21–32)
CREAT SERPL-MCNC: 0.69 MG/DL (ref 0.6–1.3)
CRP SERPL QL: 198.9 MG/L
ERYTHROCYTE [DISTWIDTH] IN BLOOD BY AUTOMATED COUNT: 14.8 % (ref 11.6–15.1)
GFR SERPL CREATININE-BSD FRML MDRD: 94 ML/MIN/1.73SQ M
GLUCOSE SERPL-MCNC: 102 MG/DL (ref 65–140)
GLUCOSE SERPL-MCNC: 163 MG/DL (ref 65–140)
HCT VFR BLD AUTO: 29.9 % (ref 34.8–46.1)
HGB BLD-MCNC: 9.3 G/DL (ref 11.5–15.4)
MCH RBC QN AUTO: 27.7 PG (ref 26.8–34.3)
MCHC RBC AUTO-ENTMCNC: 31.1 G/DL (ref 31.4–37.4)
MCV RBC AUTO: 89 FL (ref 82–98)
PLATELET # BLD AUTO: 473 THOUSANDS/UL (ref 149–390)
PMV BLD AUTO: 9.8 FL (ref 8.9–12.7)
POTASSIUM SERPL-SCNC: 3.5 MMOL/L (ref 3.5–5.3)
RBC # BLD AUTO: 3.36 MILLION/UL (ref 3.81–5.12)
SODIUM SERPL-SCNC: 137 MMOL/L (ref 135–147)
WBC # BLD AUTO: 5.54 THOUSAND/UL (ref 4.31–10.16)

## 2025-01-05 PROCEDURE — 94760 N-INVAS EAR/PLS OXIMETRY 1: CPT

## 2025-01-05 PROCEDURE — 80048 BASIC METABOLIC PNL TOTAL CA: CPT | Performed by: INTERNAL MEDICINE

## 2025-01-05 PROCEDURE — 99233 SBSQ HOSP IP/OBS HIGH 50: CPT | Performed by: INTERNAL MEDICINE

## 2025-01-05 PROCEDURE — 94640 AIRWAY INHALATION TREATMENT: CPT

## 2025-01-05 PROCEDURE — 85027 COMPLETE CBC AUTOMATED: CPT | Performed by: INTERNAL MEDICINE

## 2025-01-05 PROCEDURE — 71045 X-RAY EXAM CHEST 1 VIEW: CPT

## 2025-01-05 PROCEDURE — 82948 REAGENT STRIP/BLOOD GLUCOSE: CPT

## 2025-01-05 PROCEDURE — 94664 DEMO&/EVAL PT USE INHALER: CPT

## 2025-01-05 PROCEDURE — 86140 C-REACTIVE PROTEIN: CPT

## 2025-01-05 PROCEDURE — 99024 POSTOP FOLLOW-UP VISIT: CPT | Performed by: ORTHOPAEDIC SURGERY

## 2025-01-05 RX ORDER — METHYLPREDNISOLONE SODIUM SUCCINATE 40 MG/ML
40 INJECTION, POWDER, LYOPHILIZED, FOR SOLUTION INTRAMUSCULAR; INTRAVENOUS DAILY
Status: DISCONTINUED | OUTPATIENT
Start: 2025-01-05 | End: 2025-01-07

## 2025-01-05 RX ORDER — IPRATROPIUM BROMIDE AND ALBUTEROL SULFATE 2.5; .5 MG/3ML; MG/3ML
3 SOLUTION RESPIRATORY (INHALATION)
Status: DISCONTINUED | OUTPATIENT
Start: 2025-01-05 | End: 2025-01-05

## 2025-01-05 RX ORDER — HYDROMORPHONE HCL/PF 1 MG/ML
0.5 SYRINGE (ML) INJECTION EVERY 4 HOURS PRN
Status: DISCONTINUED | OUTPATIENT
Start: 2025-01-05 | End: 2025-01-07

## 2025-01-05 RX ORDER — MAGNESIUM HYDROXIDE/ALUMINUM HYDROXICE/SIMETHICONE 120; 1200; 1200 MG/30ML; MG/30ML; MG/30ML
30 SUSPENSION ORAL EVERY 4 HOURS PRN
Status: DISCONTINUED | OUTPATIENT
Start: 2025-01-05 | End: 2025-01-09 | Stop reason: HOSPADM

## 2025-01-05 RX ORDER — SODIUM CHLORIDE, SODIUM GLUCONATE, SODIUM ACETATE, POTASSIUM CHLORIDE, MAGNESIUM CHLORIDE, SODIUM PHOSPHATE, DIBASIC, AND POTASSIUM PHOSPHATE .53; .5; .37; .037; .03; .012; .00082 G/100ML; G/100ML; G/100ML; G/100ML; G/100ML; G/100ML; G/100ML
125 INJECTION, SOLUTION INTRAVENOUS CONTINUOUS
Status: DISCONTINUED | OUTPATIENT
Start: 2025-01-05 | End: 2025-01-06

## 2025-01-05 RX ADMIN — ASPIRIN 81 MG: 81 TABLET, COATED ORAL at 09:14

## 2025-01-05 RX ADMIN — SERTRALINE HYDROCHLORIDE 200 MG: 100 TABLET ORAL at 20:53

## 2025-01-05 RX ADMIN — HYDROXYZINE HYDROCHLORIDE 25 MG: 25 TABLET ORAL at 17:07

## 2025-01-05 RX ADMIN — CEFAZOLIN SODIUM 2000 MG: 2 SOLUTION INTRAVENOUS at 20:57

## 2025-01-05 RX ADMIN — SENNOSIDES AND DOCUSATE SODIUM 1 TABLET: 50; 8.6 TABLET ORAL at 20:53

## 2025-01-05 RX ADMIN — ACETAMINOPHEN 975 MG: 325 TABLET, FILM COATED ORAL at 20:53

## 2025-01-05 RX ADMIN — OXYCODONE HYDROCHLORIDE 10 MG: 10 TABLET ORAL at 03:38

## 2025-01-05 RX ADMIN — IPRATROPIUM BROMIDE AND ALBUTEROL SULFATE 3 ML: .5; 3 SOLUTION RESPIRATORY (INHALATION) at 19:44

## 2025-01-05 RX ADMIN — AMLODIPINE BESYLATE 5 MG: 5 TABLET ORAL at 09:13

## 2025-01-05 RX ADMIN — POLYETHYLENE GLYCOL 3350 17 G: 17 POWDER, FOR SOLUTION ORAL at 09:13

## 2025-01-05 RX ADMIN — SENNOSIDES AND DOCUSATE SODIUM 1 TABLET: 50; 8.6 TABLET ORAL at 09:13

## 2025-01-05 RX ADMIN — HYDROMORPHONE HYDROCHLORIDE 0.5 MG: 1 INJECTION, SOLUTION INTRAMUSCULAR; INTRAVENOUS; SUBCUTANEOUS at 10:48

## 2025-01-05 RX ADMIN — TIZANIDINE 4 MG: 4 TABLET ORAL at 12:17

## 2025-01-05 RX ADMIN — HYDROXYZINE HYDROCHLORIDE 25 MG: 25 TABLET ORAL at 20:52

## 2025-01-05 RX ADMIN — IPRATROPIUM BROMIDE AND ALBUTEROL SULFATE 3 ML: .5; 3 SOLUTION RESPIRATORY (INHALATION) at 14:43

## 2025-01-05 RX ADMIN — LUBIPROSTONE 8 MCG: 8 CAPSULE, GELATIN COATED ORAL at 20:52

## 2025-01-05 RX ADMIN — ALUMINUM HYDROXIDE, MAGNESIUM HYDROXIDE, AND DIMETHICONE 30 ML: 200; 20; 200 SUSPENSION ORAL at 09:13

## 2025-01-05 RX ADMIN — HYDROXYZINE HYDROCHLORIDE 25 MG: 25 TABLET ORAL at 09:13

## 2025-01-05 RX ADMIN — GUAIFENESIN AND DEXTROMETHORPHAN 10 ML: 100; 10 SYRUP ORAL at 17:07

## 2025-01-05 RX ADMIN — SODIUM CHLORIDE, SODIUM GLUCONATE, SODIUM ACETATE, POTASSIUM CHLORIDE, MAGNESIUM CHLORIDE, SODIUM PHOSPHATE, DIBASIC, AND POTASSIUM PHOSPHATE 125 ML/HR: .53; .5; .37; .037; .03; .012; .00082 INJECTION, SOLUTION INTRAVENOUS at 09:12

## 2025-01-05 RX ADMIN — BUSPIRONE HYDROCHLORIDE 15 MG: 10 TABLET ORAL at 17:07

## 2025-01-05 RX ADMIN — ACETAMINOPHEN 975 MG: 325 TABLET, FILM COATED ORAL at 05:30

## 2025-01-05 RX ADMIN — ACETAMINOPHEN 975 MG: 325 TABLET, FILM COATED ORAL at 15:52

## 2025-01-05 RX ADMIN — BUSPIRONE HYDROCHLORIDE 15 MG: 10 TABLET ORAL at 20:52

## 2025-01-05 RX ADMIN — BUSPIRONE HYDROCHLORIDE 15 MG: 10 TABLET ORAL at 09:13

## 2025-01-05 RX ADMIN — SODIUM CHLORIDE, SODIUM GLUCONATE, SODIUM ACETATE, POTASSIUM CHLORIDE, MAGNESIUM CHLORIDE, SODIUM PHOSPHATE, DIBASIC, AND POTASSIUM PHOSPHATE 125 ML/HR: .53; .5; .37; .037; .03; .012; .00082 INJECTION, SOLUTION INTRAVENOUS at 17:07

## 2025-01-05 RX ADMIN — ENOXAPARIN SODIUM 40 MG: 40 INJECTION SUBCUTANEOUS at 09:13

## 2025-01-05 RX ADMIN — CEFAZOLIN SODIUM 2000 MG: 2 SOLUTION INTRAVENOUS at 05:32

## 2025-01-05 RX ADMIN — PANTOPRAZOLE SODIUM 40 MG: 40 TABLET, DELAYED RELEASE ORAL at 05:30

## 2025-01-05 RX ADMIN — BISACODYL 10 MG: 10 SUPPOSITORY RECTAL at 21:03

## 2025-01-05 RX ADMIN — LIDOCAINE 1 PATCH: 50 PATCH TOPICAL at 20:56

## 2025-01-05 RX ADMIN — GUAIFENESIN AND DEXTROMETHORPHAN 10 ML: 100; 10 SYRUP ORAL at 09:12

## 2025-01-05 RX ADMIN — OXYCODONE HYDROCHLORIDE 10 MG: 10 TABLET ORAL at 21:03

## 2025-01-05 RX ADMIN — PRAZOSIN HYDROCHLORIDE 2 MG: 2 CAPSULE ORAL at 20:53

## 2025-01-05 RX ADMIN — PREGABALIN 150 MG: 75 CAPSULE ORAL at 20:52

## 2025-01-05 RX ADMIN — GUAIFENESIN AND DEXTROMETHORPHAN 10 ML: 100; 10 SYRUP ORAL at 20:54

## 2025-01-05 RX ADMIN — LUBIPROSTONE 8 MCG: 8 CAPSULE, GELATIN COATED ORAL at 09:14

## 2025-01-05 RX ADMIN — OXYCODONE HYDROCHLORIDE 10 MG: 10 TABLET ORAL at 09:12

## 2025-01-05 RX ADMIN — ATORVASTATIN CALCIUM 40 MG: 40 TABLET, FILM COATED ORAL at 17:07

## 2025-01-05 RX ADMIN — PREGABALIN 150 MG: 75 CAPSULE ORAL at 09:13

## 2025-01-05 RX ADMIN — METHYLPREDNISOLONE SODIUM SUCCINATE 40 MG: 40 INJECTION, POWDER, FOR SOLUTION INTRAMUSCULAR; INTRAVENOUS at 11:59

## 2025-01-05 RX ADMIN — CEFAZOLIN SODIUM 2000 MG: 2 SOLUTION INTRAVENOUS at 15:52

## 2025-01-05 RX ADMIN — PREGABALIN 150 MG: 75 CAPSULE ORAL at 17:07

## 2025-01-05 RX ADMIN — ARIPIPRAZOLE 10 MG: 10 TABLET ORAL at 09:13

## 2025-01-05 NOTE — ASSESSMENT & PLAN NOTE
Status post removal L2-S1 hardware, with new L5-S1 fusion performed by Dr. Stevens on 12/19 without complication  Pt reports following procedure and rehab she has continued to have pain limiting her ambulation  She denies any lower extremity numbness/weakness, saddle parenthesis, bowel or bladder retention/incontinence   Reviewed CT scan with contrast done on admission with radiologist which did not show any central canal narrowing  Orthopedics notes exam similar to prior documentation   Pain control with oxycodone as she was not getting any relief with home morphine  Continue multimodal pain regimen   PT/OT

## 2025-01-05 NOTE — PROGRESS NOTES
Progress Note - Hospitalist   Name: Samantha Rodriguez 59 y.o. female I MRN: [id number]  Unit/Bed#: [place] [address] -01 I Date of Admission: 1/2/2025   Date of Service: 1/5/2025 I Hospital Day: 2    Assessment & Plan  Ambulatory dysfunction  Patient reports after getting back from rehab she has been struggling to walk secondary to her back pain  She typically ambulates with a walker  Continue PT/OT  Lumbar radiculopathy  Status post removal L2-S1 hardware, with new L5-S1 fusion performed by Dr. Stevens on 12/19 without complication  Pt reports following procedure and rehab she has continued to have pain limiting her ambulation  She denies any lower extremity numbness/weakness, saddle parenthesis, bowel or bladder retention/incontinence   Reviewed CT scan with contrast done on admission with radiologist which did not show any central canal narrowing  Orthopedics notes exam similar to prior documentation   Pain control with oxycodone as she was not getting any relief with home morphine  Continue multimodal pain regimen   PT/OT  SIRS (systemic inflammatory response syndrome) (HCC)  SIRS suspected secondary to RSV infection  Independently reviewed CXR without obvious consolidation   Blood cultures no growth  Urine culture with E. coli sensitive to cefazolin, denied urinary symptoms  If high fevers persist may need to consider drug fever and change Ancef to an alternative antibiotic  Start IVF given persistent fevers   Essential hypertension  Continue amlodipine   Bipolar disorder (HCC)  Mood stable  Continue abilify, zoloft, buspar, minipress  Wound of sacral region  Sacral wound distal to previous incision.  Orthopedist does not feel represents dehiscence but more likely sacral wound.  Preveena vac placed   Empiric course of antibiotics with IV Ancef to treat possibly early superficial infection around that she has been site  RSV bronchitis  Worsening cough and wheezing today  Start IV steroids daily plus scheduled  nebulizers  Continue antitussives  Supportive measures    VTE Pharmacologic Prophylaxis: lovenox     Patient Centered Rounds:  Patient care rounds were performed with nursing    Discussions with Specialists or Other Care Team Provider: Independently reviewed case with orthopedic team     Education and Discussions with Family / Patient: Marleny major mobile phone approx [phone] no answer     Time Spent for Care: I have spent a total time of 57 minutes on 25 in caring for this patient including Diagnostic results, Risks and benefits of tx options, Instructions for management, Patient and family education, Impressions, Counseling / Coordination of care, Documenting in the medical record, Reviewing / ordering tests, medicine, procedures  , and Communicating with other healthcare professionals .      Current Length of Stay: 2 day(s)    Current Patient Status: Inpatient   Certification Statement: The patient will continue to require additional inpatient hospital stay due to management of RSV bronchitis, management of sacral wound, ambulatory dysfunction     Discharge Plan: 48 hours     Code Status: Level 1 - Full Code      Subjective:   Patient seen and evaluated at bedside. She is coughing a lot.     Objective:     Vitals:   Temp (24hrs), Av °F (37.8 °C), Min:99.1 °F (37.3 °C), Max:101.2 °F (38.4 °C)    Temp:  [99.1 °F (37.3 °C)-101.2 °F (38.4 °C)] 99.1 °F (37.3 °C)  HR:  [65-83] 77  Resp:  [18] 18  BP: ()/(55-75) 110/74  SpO2:  [96 %-99 %] 99 %  Body mass index is 39.41 kg/m².     Input and Output Summary (last 24 hours):       Intake/Output Summary (Last 24 hours) at 2025 1259  Last data filed at 2025 0935  Gross per 24 hour   Intake 910 ml   Output 550 ml   Net 360 ml       Physical Exam:     Physical Exam  Vitals reviewed.   Constitutional:       General: She is not in acute distress.     Appearance: She is well-developed. She is ill-appearing. She is not toxic-appearing or diaphoretic.   HENT:       Head: Normocephalic and atraumatic.      Mouth/Throat:      Mouth: Mucous membranes are moist.   Eyes:      General: No scleral icterus.     Extraocular Movements: Extraocular movements intact.   Cardiovascular:      Rate and Rhythm: Normal rate and regular rhythm.      Heart sounds: Normal heart sounds.   Pulmonary:      Effort: Pulmonary effort is normal. No respiratory distress.      Breath sounds: Normal breath sounds. No wheezing or rales.   Abdominal:      General: There is no distension.      Palpations: Abdomen is soft.      Tenderness: There is no abdominal tenderness. There is no guarding or rebound.   Musculoskeletal:         General: No swelling, tenderness or deformity.   Skin:     General: Skin is warm and dry.   Neurological:      General: No focal deficit present.      Mental Status: She is alert. Mental status is at baseline.      Comments: 3/5 b/l weakness limited by pain    Psychiatric:         Mood and Affect: Mood normal.         Behavior: Behavior normal.         Thought Content: Thought content normal.         Judgment: Judgment normal.         Additional Data:     Labs: I have reviewed pertinent results     Results from last 7 days   Lab Units 01/05/25  0500 01/04/25  0539 01/03/25  0315 01/02/25  2200   WBC Thousand/uL 5.54   < > 9.50 11.19*   HEMOGLOBIN g/dL 9.3*   < > 9.3* 10.5*   HEMATOCRIT % 29.9*   < > 28.7* 32.7*   PLATELETS Thousands/uL 473*   < > 405* 434*   BANDS PCT %  --   --   --  1   SEGS PCT %  --   --  81*  --    LYMPHO PCT %  --   --  9* 2*   MONO PCT %  --   --  8 7   EOS PCT %  --   --  1 0    < > = values in this interval not displayed.     Results from last 7 days   Lab Units 01/05/25  0500   SODIUM mmol/L 137   POTASSIUM mmol/L 3.5   CHLORIDE mmol/L 102   CO2 mmol/L 28   BUN mg/dL 12   CREATININE mg/dL 0.69   ANION GAP mmol/L 7   CALCIUM mg/dL 8.2*   GLUCOSE RANDOM mg/dL 102         Results from last 7 days   Lab Units 01/04/25  2110   POC GLUCOSE mg/dl 119          Results from last 7 days   Lab Units 01/03/25  0315   LACTIC ACID mmol/L 0.8   PROCALCITONIN ng/ml 0.36*         Imaging: I have reviewed pertinent imaging       Recent Cultures (last 7 days):     Results from last 7 days   Lab Units 01/03/25  0626 01/03/25  0316   BLOOD CULTURE   --  No Growth at 48 hrs.  No Growth at 48 hrs.   URINE CULTURE  >100,000 cfu/ml Escherichia coli*  50,000-59,000 cfu/ml  --        Last 24 Hours Medication List:   Current Facility-Administered Medications   Medication Dose Route Frequency Provider Last Rate    acetaminophen  975 mg Oral Q8H [facility] Humberto Smith, JACQUI      aluminum-magnesium hydroxide-simethicone  30 mL Oral Daily Humberto Smith, JACQUI      amLODIPine  5 mg Oral Daily Humberto Smith, JACQUI      ARIPiprazole  10 mg Oral Daily Humberto Smith, JACQUI      aspirin  81 mg Oral Daily Humberto Smith, JACQUI      atorvastatin  40 mg Oral Daily With Dinner Humberto Smith PA-C      bisacodyl  10 mg Rectal Daily PRN Humbetro Smith, JACQUI      busPIRone  15 mg Oral TID Humberto Smith PA-C      cefazolin  2,000 mg Intravenous Q8H Nedra Monteiro PA-C 2,000 mg (01/05/25 0532)    dextromethorphan-guaiFENesin  10 mL Oral TID Vitor Deluca DO      enoxaparin  40 mg Subcutaneous Daily Humberto Smith PA-C      HYDROmorphone  0.5 mg Intravenous Q6H PRN Humberto Smith, JACQUI      hydrOXYzine HCL  25 mg Oral TID Humberto Smith, JACQUI      ipratropium-albuterol  3 mL Nebulization Q6H PRN Vitro Deluca DO      ipratropium-albuterol  3 mL Nebulization TID Vitor Deluca, DO      lidocaine  1 patch Topical Daily Humberto Smith PA-C      lubiprostone  8 mcg Oral BID Humberto Smith, JACQUI      methylPREDNISolone sodium succinate  40 mg Intravenous Daily Vitor Deluca, DO      multi-electrolyte  125 mL/hr Intravenous Continuous Vitor Deluca  mL/hr (01/05/25 0912)    ondansetron  4 mg Intravenous Q6H PRN Humberto Smith PA-C      oxyCODONE  5 mg Oral  Q4H PRN Vitor Deluca DO      Or    oxyCODONE  10 mg Oral Q4H PRN Vitor Deluca DO      pantoprazole  40 mg Oral Early Morning Humberto Smith PA-C      pneumococcal 20-cheryl conj vacc  0.5 mL Intramuscular Once PRN Sanket Pearce MD      polyethylene glycol  17 g Oral Daily Vitor Deluca DO      prazosin  2 mg Oral HS Humberto Smith PA-C      pregabalin  150 mg Oral TID Humberto Smith PA-C      senna-docusate sodium  1 tablet Oral BID Vitor Deluca DO      sertraline  200 mg Oral HS Humberto Smith PA-C      tiZANidine  4 mg Oral Q8H PRN Vitor Deluca DO      Valbenazine Tosylate  1 capsule Oral Daily Humberto Smith PA-C          Today, Patient Was Seen By: Vitor Deluca DO    ** Please Note: Dictation voice to text software may have been used in the creation of this document. **

## 2025-01-05 NOTE — PROGRESS NOTES
"Progress Note - Orthopedics   Name: Samantha Rodriguez 59 y.o. female I MRN: [id number]  Unit/Bed#: [place] [address] -01 I Date of Admission: 1/2/2025   Date of Service: 1/5/2025 I Hospital Day: 2    Assessment & Plan  Ambulatory dysfunction  2 weeks s/p removal hardware L2-S1, posterior fusion L5-S1, revision instrumentation L2-pelvis, bilateral S2 alar screws with Dr. Bills and Dr. Stevens on 12/19.  CRP improving from 231.1 > 224.7 > 198.9. Patient continues to have high fevers without leukocytosis. Also being treated for RSV bronchitis per SLIM  Currently on ancef q8h  Prevena incisional vac applied over incision site and sacral wound on back  WBAT bilateral upper and lower extremities  LSO brace when OOB  PT/OT, recommending level 2 rehab  Pain control, DVT ppx per primary  Lumbar radiculopathy    SIRS (systemic inflammatory response syndrome) (HCC)    Wound of sacral region    RSV bronchitis      Orthopedics service will follow.    Subjective   59 y.o.female seen and evaluated at bedside, resting comfortably in bed at time of exam. She reports continued pain in the back and left hip. Denies chills, CP, SOB, N/V, numbness or tingling in the bilateral lower extremities. Denies issues with urination or bowel movements.     Objective :  /74   Pulse 77   Temp 99.1 °F (37.3 °C)   Resp 18   Ht 5' 1\" (1.549 m)   Wt 94.6 kg (208 lb 8.9 oz)   SpO2 99%   BMI 39.41 kg/m²   Gen: No acute distress, resting comfortably in bed  Musculoskeletal: back and bilateral lower extremity  Steri strips and Abd in place over incision site and sacral wound, removed. Minimal drainage on dressings. Prevena incisional vac applied.  Left motor: Hip flexion 3/5, knee extension 3/5, knee flexion 4/5, dorsiflexion 3/5, plantar flexion 4/5, EHL 3/5  Right motor: Hip flexion 4/5, knee extension 4/5, knee flexion 4/5, dorsiflexion 3/5, plantarflexion 4/5, EHL 3/5  Sensation intact L2-S1 bilateral lower extremities  2+ DP pulses " "bilaterally      Lab Results: I have reviewed the following results:  Recent Labs     01/02/25  2200 01/03/25  0315 01/03/25  1829 01/04/25  0539 01/04/25  1258 01/05/25  0500   WBC 11.19* 9.50  --  6.61  --  5.54   HGB 10.5* 9.3*  --  9.7*  --  9.3*   HCT 32.7* 28.7*  --  30.9*  --  29.9*   * 405*  --  456*  --  473*   BANDSPCT 1  --   --   --   --   --    BUN 8 10  --  15  --  12   CREATININE 0.69 0.92  --  0.98  --  0.69   ESR  --   --  81*  --   --   --    CRP  --   --  231.1*  --    < > 198.9*    < > = values in this interval not displayed.     Blood Culture:    Lab Results   Component Value Date    BLOODCX No Growth at 48 hrs. 01/03/2025    BLOODCX No Growth at 48 hrs. 01/03/2025     Wound Culture: No results found for: \"WOUNDCULT\"    Nedra Monteiro PA-C    "

## 2025-01-05 NOTE — PLAN OF CARE
Problem: PAIN - ADULT  Goal: Verbalizes/displays adequate comfort level or baseline comfort level  Description: Interventions:  - Encourage patient to monitor pain and request assistance  - Assess pain using appropriate pain scale  - Administer analgesics based on type and severity of pain and evaluate response  - Implement non-pharmacological measures as appropriate and evaluate response  - Consider cultural and social influences on pain and pain management  - Notify physician/advanced practitioner if interventions unsuccessful or patient reports new pain  Outcome: Progressing     Problem: INFECTION - ADULT  Goal: Absence or prevention of progression during hospitalization  Description: INTERVENTIONS:  - Assess and monitor for signs and symptoms of infection  - Monitor lab/diagnostic results  - Monitor all insertion sites, i.e. indwelling lines, tubes, and drains  - Monitor endotracheal if appropriate and nasal secretions for changes in amount and color  - [place] appropriate cooling/warming therapies per order  - Administer medications as ordered  - Instruct and encourage patient and family to use good hand hygiene technique  - Identify and instruct in appropriate isolation precautions for identified infection/condition  Outcome: Progressing     Problem: SAFETY ADULT  Goal: Patient will remain free of falls  Description: INTERVENTIONS:  - Educate patient/family on patient safety including physical limitations  - Instruct patient to call for assistance with activity   - Consult OT/PT to assist with strengthening/mobility   - Keep Call bell within reach  - Keep bed low and locked with side rails adjusted as appropriate  - Keep care items and personal belongings within reach  - Initiate and maintain comfort rounds  - Make Fall Risk Sign visible to staff  - Offer Toileting every 2 Hours, in advance of need  - Initiate/Maintain bed alarm  - Obtain necessary fall risk management equipment: bed alarms   - Apply  yellow socks and bracelet for high fall risk patients  - Consider moving patient to room near nurses station  Outcome: Progressing  Goal: Maintain or return to baseline ADL function  Description: INTERVENTIONS:  -  Assess patient's ability to carry out ADLs; assess patient's baseline for ADL function and identify physical deficits which impact ability to perform ADLs (bathing, care of mouth/teeth, toileting, grooming, dressing, etc.)  - Assess/evaluate cause of self-care deficits   - Assess range of motion  - Assess patient's mobility; develop plan if impaired  - Assess patient's need for assistive devices and provide as appropriate  - Encourage maximum independence but intervene and supervise when necessary  - Involve family in performance of ADLs  - Assess for home care needs following discharge   - Consider OT consult to assist with ADL evaluation and planning for discharge  - Provide patient education as appropriate  Outcome: Progressing  Goal: Maintains/Returns to pre admission functional level  Description: INTERVENTIONS:  - Perform AM-PAC 6 Click Basic Mobility/ Daily Activity assessment daily.  - Set and communicate daily mobility goal to care team and patient/family/caregiver.   - Collaborate with rehabilitation services on mobility goals if consulted  - Perform Range of Motion 4 times a day.  - Reposition patient every 2 hours.  - Dangle patient 3 times a day  - Stand patient 3 times a day  - Ambulate patient 3 times a day  - Out of bed to chair 3 times a day   - Out of bed for meals 3 times a day  - Out of bed for toileting  - Record patient progress and toleration of activity level   Outcome: Progressing     Problem: DISCHARGE PLANNING  Goal: Discharge to home or other facility with appropriate resources  Description: INTERVENTIONS:  - Identify barriers to discharge w/patient and caregiver  - Arrange for needed discharge resources and transportation as appropriate  - Identify discharge learning needs  (meds, wound care, etc.)  - Arrange for interpretive services to assist at discharge as needed  - Refer to Case Management Department for coordinating discharge planning if the patient needs post-hospital services based on physician/advanced practitioner order or complex needs related to functional status, cognitive ability, or social support system  Outcome: Progressing     Problem: Knowledge Deficit  Goal: Patient/family/caregiver demonstrates understanding of disease process, treatment plan, medications, and discharge instructions  Description: Complete learning assessment and assess knowledge base.  Interventions:  - Provide teaching at level of understanding  - Provide teaching via preferred learning methods  Outcome: Progressing     Problem: SKIN/TISSUE INTEGRITY - ADULT  Goal: Skin Integrity remains intact(Skin Breakdown Prevention)  Description: Assess:  -Perform Ignacio assessment every shift  -Clean and moisturize skin every shift  -Inspect skin when repositioning, toileting, and assisting with ADLS  -Assess under medical devices such as Masimo every shift  -Assess extremities for adequate circulation and sensation     Bed Management:  -Have minimal linens on bed & keep smooth, unwrinkled  -Change linens as needed when moist or perspiring  -Avoid sitting or lying in one position for more than 1 hours while in bed  -Keep HOB at 30 degrees     Toileting:  -Offer bedside commode  -Assess for incontinence every shift  -Use incontinent care products after each incontinent episode such as shield wipes     Activity:  -Mobilize patient 3 times a day  -Encourage activity and walks on unit  -Encourage or provide ROM exercises   -Turn and reposition patient every 2 Hours  -Use appropriate equipment to lift or move patient in bed  -Instruct/ Assist with weight shifting every 1 hour when out of bed in chair  -Consider limitation of chair time 1 hour intervals    Skin Care:  -Avoid use of baby powder, tape, friction and  shearing, hot water or constrictive clothing  -Relieve pressure over bony prominences using wedges   -Do not massage red bony areas    Next Steps:  -Teach patient strategies to minimize risks such as skin breakdown   -Consider consults to  interdisciplinary teams such as wound care nurse   Outcome: Progressing  Goal: Incision(s), wounds(s) or drain site(s) healing without S/S of infection  Description: INTERVENTIONS  - Assess and document dressing, incision, wound bed, drain sites and surrounding tissue  - Provide patient and family education  - Perform skin care/dressing changes every shift  Outcome: Progressing     Problem: HEMATOLOGIC - ADULT  Goal: Maintains hematologic stability  Description: INTERVENTIONS  - Assess for signs and symptoms of bleeding or hemorrhage  - Monitor labs  - Administer supportive blood products/factors as ordered and appropriate  Outcome: Progressing     Problem: MUSCULOSKELETAL - ADULT  Goal: Maintain or return mobility to safest level of function  Description: INTERVENTIONS:  - Assess patient's ability to carry out ADLs; assess patient's baseline for ADL function and identify physical deficits which impact ability to perform ADLs (bathing, care of mouth/teeth, toileting, grooming, dressing, etc.)  - Assess/evaluate cause of self-care deficits   - Assess range of motion  - Assess patient's mobility  - Assess patient's need for assistive devices and provide as appropriate  - Encourage maximum independence but intervene and supervise when necessary  - Involve family in performance of ADLs  - Assess for home care needs following discharge   - Consider OT consult to assist with ADL evaluation and planning for discharge  - Provide patient education as appropriate  Outcome: Progressing  Goal: Maintain proper alignment of affected body part  Description: INTERVENTIONS:  - Support, maintain and protect limb and body alignment  - Provide patient/ family with appropriate education  Outcome:  Progressing     Problem: Prexisting or High Potential for Compromised Skin Integrity  Goal: Skin integrity is maintained or improved  Description: INTERVENTIONS:  - Identify patients at risk for skin breakdown  - Assess and monitor skin integrity  - Assess and monitor nutrition and hydration status  - Monitor labs   - Assess for incontinence   - Turn and reposition patient  - Assist with mobility/ambulation  - Relieve pressure over bony prominences  - Avoid friction and shearing  - Provide appropriate hygiene as needed including keeping skin clean and dry  - Evaluate need for skin moisturizer/barrier cream  - Collaborate with interdisciplinary team   - Patient/family teaching  - Consider wound care consult   Outcome: Progressing

## 2025-01-05 NOTE — ASSESSMENT & PLAN NOTE
SIRS suspected secondary to RSV infection  Independently reviewed CXR without obvious consolidation   Blood cultures no growth  Urine culture with E. coli sensitive to cefazolin, denied urinary symptoms  If high fevers persist may need to consider drug fever and change Ancef to an alternative antibiotic  Start IVF given persistent fevers

## 2025-01-05 NOTE — RESTORATIVE TECHNICIAN NOTE
Restorative Technician Note      Patient Name: Samantha Rodriguez     Restorative Tech Visit Date: 01/05/25  Note Type: Bracing, Follow-up fitting  Patient Position Upon Consult: Supine  Activity Performed: Stood; Repositioned  Assistive Device: Roller walker  Brace Applied: [place] LSO  Additional Brace Ordered: No  Patient Position When Brace Applied: Standing  Bracing Recommendations: None  Patient Position at End of Consult: All needs within reach; Supine

## 2025-01-05 NOTE — ASSESSMENT & PLAN NOTE
2 weeks s/p removal hardware L2-S1, posterior fusion L5-S1, revision instrumentation L2-pelvis, bilateral S2 alar screws with Dr. Bills and Dr. Stevens on 12/19.  CRP improving from 231.1 > 224.7 > 198.9. Patient continues to have high fevers without leukocytosis. Also being treated for RSV bronchitis per SLIM  Currently on ancef q8h  Prevena incisional vac applied over incision site and sacral wound on back  WBAT bilateral upper and lower extremities  LSO brace when OOB  PT/OT, recommending level 2 rehab  Pain control, DVT ppx per primary

## 2025-01-05 NOTE — ASSESSMENT & PLAN NOTE
Sacral wound distal to previous incision.  Orthopedist does not feel represents dehiscence but more likely sacral wound.  Preveena vac placed   Empiric course of antibiotics with IV Ancef to treat possibly early superficial infection around that she has been site

## 2025-01-05 NOTE — PLAN OF CARE
Problem: PAIN - ADULT  Goal: Verbalizes/displays adequate comfort level or baseline comfort level  Description: Interventions:  - Encourage patient to monitor pain and request assistance  - Assess pain using appropriate pain scale  - Administer analgesics based on type and severity of pain and evaluate response  - Implement non-pharmacological measures as appropriate and evaluate response  - Consider cultural and social influences on pain and pain management  - Notify physician/advanced practitioner if interventions unsuccessful or patient reports new pain  Outcome: Progressing     Problem: INFECTION - ADULT  Goal: Absence or prevention of progression during hospitalization  Description: INTERVENTIONS:  - Assess and monitor for signs and symptoms of infection  - Monitor lab/diagnostic results  - Monitor all insertion sites, i.e. indwelling lines, tubes, and drains  - Monitor endotracheal if appropriate and nasal secretions for changes in amount and color  - [place] appropriate cooling/warming therapies per order  - Administer medications as ordered  - Instruct and encourage patient and family to use good hand hygiene technique  - Identify and instruct in appropriate isolation precautions for identified infection/condition  Outcome: Progressing     Problem: SAFETY ADULT  Goal: Patient will remain free of falls  Description: INTERVENTIONS:  - Educate patient/family on patient safety including physical limitations  - Instruct patient to call for assistance with activity   - Consult OT/PT to assist with strengthening/mobility   - Keep Call bell within reach  - Keep bed low and locked with side rails adjusted as appropriate  - Keep care items and personal belongings within reach  - Initiate and maintain comfort rounds  - Make Fall Risk Sign visible to staff  - Offer Toileting every 2 Hours, in advance of need  - Initiate/Maintain bed alarm  - Obtain necessary fall risk management equipment: bed alarms   - Apply  yellow socks and bracelet for high fall risk patients  - Consider moving patient to room near nurses station  Outcome: Progressing  Goal: Maintain or return to baseline ADL function  Description: INTERVENTIONS:  -  Assess patient's ability to carry out ADLs; assess patient's baseline for ADL function and identify physical deficits which impact ability to perform ADLs (bathing, care of mouth/teeth, toileting, grooming, dressing, etc.)  - Assess/evaluate cause of self-care deficits   - Assess range of motion  - Assess patient's mobility; develop plan if impaired  - Assess patient's need for assistive devices and provide as appropriate  - Encourage maximum independence but intervene and supervise when necessary  - Involve family in performance of ADLs  - Assess for home care needs following discharge   - Consider OT consult to assist with ADL evaluation and planning for discharge  - Provide patient education as appropriate  Outcome: Progressing  Goal: Maintains/Returns to pre admission functional level  Description: INTERVENTIONS:  - Perform AM-PAC 6 Click Basic Mobility/ Daily Activity assessment daily.  - Set and communicate daily mobility goal to care team and patient/family/caregiver.   - Collaborate with rehabilitation services on mobility goals if consulted  - Perform Range of Motion 4 times a day.  - Reposition patient every 2 hours.  - Dangle patient 3 times a day  - Stand patient 3 times a day  - Ambulate patient 3 times a day  - Out of bed to chair 3 times a day   - Out of bed for meals 3 times a day  - Out of bed for toileting  - Record patient progress and toleration of activity level   Outcome: Progressing     Problem: DISCHARGE PLANNING  Goal: Discharge to home or other facility with appropriate resources  Description: INTERVENTIONS:  - Identify barriers to discharge w/patient and caregiver  - Arrange for needed discharge resources and transportation as appropriate  - Identify discharge learning needs  (meds, wound care, etc.)  - Arrange for interpretive services to assist at discharge as needed  - Refer to Case Management Department for coordinating discharge planning if the patient needs post-hospital services based on physician/advanced practitioner order or complex needs related to functional status, cognitive ability, or social support system  Outcome: Progressing     Problem: Knowledge Deficit  Goal: Patient/family/caregiver demonstrates understanding of disease process, treatment plan, medications, and discharge instructions  Description: Complete learning assessment and assess knowledge base.  Interventions:  - Provide teaching at level of understanding  - Provide teaching via preferred learning methods  Outcome: Progressing     Problem: SKIN/TISSUE INTEGRITY - ADULT  Goal: Skin Integrity remains intact(Skin Breakdown Prevention)  Description: Assess:  -Perform Ignacio assessment every shift  -Clean and moisturize skin every shift  -Inspect skin when repositioning, toileting, and assisting with ADLS  -Assess under medical devices such as Masimo every shift  -Assess extremities for adequate circulation and sensation     Bed Management:  -Have minimal linens on bed & keep smooth, unwrinkled  -Change linens as needed when moist or perspiring  -Avoid sitting or lying in one position for more than 1 hours while in bed  -Keep HOB at 30 degrees     Toileting:  -Offer bedside commode  -Assess for incontinence every shift  -Use incontinent care products after each incontinent episode such as shield wipes     Activity:  -Mobilize patient 3 times a day  -Encourage activity and walks on unit  -Encourage or provide ROM exercises   -Turn and reposition patient every 2 Hours  -Use appropriate equipment to lift or move patient in bed  -Instruct/ Assist with weight shifting every 1 hour when out of bed in chair  -Consider limitation of chair time 1 hour intervals    Skin Care:  -Avoid use of baby powder, tape, friction and  shearing, hot water or constrictive clothing  -Relieve pressure over bony prominences using wedges   -Do not massage red bony areas    Next Steps:  -Teach patient strategies to minimize risks such as skin breakdown   -Consider consults to  interdisciplinary teams such as wound care nurse   Outcome: Progressing  Goal: Incision(s), wounds(s) or drain site(s) healing without S/S of infection  Description: INTERVENTIONS  - Assess and document dressing, incision, wound bed, drain sites and surrounding tissue  - Provide patient and family education  - Perform skin care/dressing changes every shift  Outcome: Progressing     Problem: HEMATOLOGIC - ADULT  Goal: Maintains hematologic stability  Description: INTERVENTIONS  - Assess for signs and symptoms of bleeding or hemorrhage  - Monitor labs  - Administer supportive blood products/factors as ordered and appropriate  Outcome: Progressing     Problem: MUSCULOSKELETAL - ADULT  Goal: Maintain or return mobility to safest level of function  Description: INTERVENTIONS:  - Assess patient's ability to carry out ADLs; assess patient's baseline for ADL function and identify physical deficits which impact ability to perform ADLs (bathing, care of mouth/teeth, toileting, grooming, dressing, etc.)  - Assess/evaluate cause of self-care deficits   - Assess range of motion  - Assess patient's mobility  - Assess patient's need for assistive devices and provide as appropriate  - Encourage maximum independence but intervene and supervise when necessary  - Involve family in performance of ADLs  - Assess for home care needs following discharge   - Consider OT consult to assist with ADL evaluation and planning for discharge  - Provide patient education as appropriate  Outcome: Progressing  Goal: Maintain proper alignment of affected body part  Description: INTERVENTIONS:  - Support, maintain and protect limb and body alignment  - Provide patient/ family with appropriate education  Outcome:  Progressing     Problem: Prexisting or High Potential for Compromised Skin Integrity  Goal: Skin integrity is maintained or improved  Description: INTERVENTIONS:  - Identify patients at risk for skin breakdown  - Assess and monitor skin integrity  - Assess and monitor nutrition and hydration status  - Monitor labs   - Assess for incontinence   - Turn and reposition patient  - Assist with mobility/ambulation  - Relieve pressure over bony prominences  - Avoid friction and shearing  - Provide appropriate hygiene as needed including keeping skin clean and dry  - Evaluate need for skin moisturizer/barrier cream  - Collaborate with interdisciplinary team   - Patient/family teaching  - Consider wound care consult   Outcome: Progressing

## 2025-01-05 NOTE — TELEPHONE ENCOUNTER
Prior auth for [place] submitted through [facility], [place] [id number]. Awaiting determination.

## 2025-01-05 NOTE — UTILIZATION REVIEW
Notification of Unplanned, Urgent, or   Emergency Inpatient Admission   AUTHORIZATION REQUEST   Admitting Facility [place] [address]  Tax ID: [id number]  NPI: [id number]  Place of Service: Acute Care Hospital  Admission Level of Care: Inpatient  Place of Service Code: 21     Attending Physician Information  Attending Name and NPI#: Vitor Yosi  [[id number]  Phone: [phone]     Admission Information  Inpatient Admission Date/Time: 1/3/25  2:45 PM  Discharge Date/Time: No discharge date for patient encounter.  Admitting Diagnosis Code/Description:  Swelling [R60.9]  Ambulatory dysfunction [R26.2]  Chronic low back pain, unspecified back pain laterality, unspecified whether sciatica present [M54.50, G89.29]     Utilization Review Contact  Janice Teixeira Utilization   Phone: [phone]  Fax: [phone]  Email: [email or web address]  Contact for approvals/pending authorizations, clinical reviews, and discharge.     Physician Advisory Services Contact  Medical Necessity Denial & [id number] Discussion  Phone: [phone]  Fax: [phone]  Email: [email or web address]     DISCHARGE SUPPORT TEAM:  For Patients Discharge Needs & Updates  Phone: [phone] opt. 2 Fax: [phone]  Email: [email or web address]

## 2025-01-05 NOTE — ASSESSMENT & PLAN NOTE
Worsening cough and wheezing today  Start IV steroids daily plus scheduled nebulizers  Continue antitussives  Supportive measures

## 2025-01-06 LAB
ANION GAP SERPL CALCULATED.3IONS-SCNC: 8 MMOL/L (ref 4–13)
BUN SERPL-MCNC: 8 MG/DL (ref 5–25)
CALCIUM SERPL-MCNC: 8.2 MG/DL (ref 8.4–10.2)
CHLORIDE SERPL-SCNC: 103 MMOL/L (ref 96–108)
CO2 SERPL-SCNC: 29 MMOL/L (ref 21–32)
CREAT SERPL-MCNC: 0.59 MG/DL (ref 0.6–1.3)
ERYTHROCYTE [DISTWIDTH] IN BLOOD BY AUTOMATED COUNT: 14.6 % (ref 11.6–15.1)
GFR SERPL CREATININE-BSD FRML MDRD: 99 ML/MIN/1.73SQ M
GLUCOSE SERPL-MCNC: 101 MG/DL (ref 65–140)
GLUCOSE SERPL-MCNC: 102 MG/DL (ref 65–140)
HCT VFR BLD AUTO: 29.9 % (ref 34.8–46.1)
HGB BLD-MCNC: 9.2 G/DL (ref 11.5–15.4)
MCH RBC QN AUTO: 27.3 PG (ref 26.8–34.3)
MCHC RBC AUTO-ENTMCNC: 30.8 G/DL (ref 31.4–37.4)
MCV RBC AUTO: 89 FL (ref 82–98)
PLATELET # BLD AUTO: 460 THOUSANDS/UL (ref 149–390)
PMV BLD AUTO: 9.8 FL (ref 8.9–12.7)
POTASSIUM SERPL-SCNC: 3.7 MMOL/L (ref 3.5–5.3)
RBC # BLD AUTO: 3.37 MILLION/UL (ref 3.81–5.12)
SODIUM SERPL-SCNC: 140 MMOL/L (ref 135–147)
WBC # BLD AUTO: 5.63 THOUSAND/UL (ref 4.31–10.16)

## 2025-01-06 PROCEDURE — 99024 POSTOP FOLLOW-UP VISIT: CPT | Performed by: PHYSICIAN ASSISTANT

## 2025-01-06 PROCEDURE — 99232 SBSQ HOSP IP/OBS MODERATE 35: CPT

## 2025-01-06 PROCEDURE — 97530 THERAPEUTIC ACTIVITIES: CPT

## 2025-01-06 PROCEDURE — 85027 COMPLETE CBC AUTOMATED: CPT | Performed by: INTERNAL MEDICINE

## 2025-01-06 PROCEDURE — 97535 SELF CARE MNGMENT TRAINING: CPT

## 2025-01-06 PROCEDURE — 97116 GAIT TRAINING THERAPY: CPT

## 2025-01-06 PROCEDURE — 80048 BASIC METABOLIC PNL TOTAL CA: CPT | Performed by: INTERNAL MEDICINE

## 2025-01-06 PROCEDURE — 82948 REAGENT STRIP/BLOOD GLUCOSE: CPT

## 2025-01-06 RX ORDER — TRAZODONE HYDROCHLORIDE 100 MG/1
300 TABLET ORAL
Status: DISCONTINUED | OUTPATIENT
Start: 2025-01-06 | End: 2025-01-09 | Stop reason: HOSPADM

## 2025-01-06 RX ADMIN — PRAZOSIN HYDROCHLORIDE 2 MG: 2 CAPSULE ORAL at 21:27

## 2025-01-06 RX ADMIN — PREGABALIN 150 MG: 75 CAPSULE ORAL at 21:28

## 2025-01-06 RX ADMIN — ACETAMINOPHEN 975 MG: 325 TABLET, FILM COATED ORAL at 21:28

## 2025-01-06 RX ADMIN — CEFAZOLIN SODIUM 2000 MG: 2 SOLUTION INTRAVENOUS at 05:09

## 2025-01-06 RX ADMIN — CEFAZOLIN SODIUM 2000 MG: 2 SOLUTION INTRAVENOUS at 21:29

## 2025-01-06 RX ADMIN — GUAIFENESIN AND DEXTROMETHORPHAN 10 ML: 100; 10 SYRUP ORAL at 09:12

## 2025-01-06 RX ADMIN — GUAIFENESIN AND DEXTROMETHORPHAN 10 ML: 100; 10 SYRUP ORAL at 15:37

## 2025-01-06 RX ADMIN — SODIUM CHLORIDE, SODIUM GLUCONATE, SODIUM ACETATE, POTASSIUM CHLORIDE, MAGNESIUM CHLORIDE, SODIUM PHOSPHATE, DIBASIC, AND POTASSIUM PHOSPHATE 125 ML/HR: .53; .5; .37; .037; .03; .012; .00082 INJECTION, SOLUTION INTRAVENOUS at 09:13

## 2025-01-06 RX ADMIN — METHYLPREDNISOLONE SODIUM SUCCINATE 40 MG: 40 INJECTION, POWDER, FOR SOLUTION INTRAMUSCULAR; INTRAVENOUS at 09:13

## 2025-01-06 RX ADMIN — LUBIPROSTONE 8 MCG: 8 CAPSULE, GELATIN COATED ORAL at 21:32

## 2025-01-06 RX ADMIN — ACETAMINOPHEN 975 MG: 325 TABLET, FILM COATED ORAL at 13:07

## 2025-01-06 RX ADMIN — BUSPIRONE HYDROCHLORIDE 15 MG: 10 TABLET ORAL at 15:37

## 2025-01-06 RX ADMIN — ATORVASTATIN CALCIUM 40 MG: 40 TABLET, FILM COATED ORAL at 15:37

## 2025-01-06 RX ADMIN — BUSPIRONE HYDROCHLORIDE 15 MG: 10 TABLET ORAL at 21:29

## 2025-01-06 RX ADMIN — OXYCODONE HYDROCHLORIDE 10 MG: 10 TABLET ORAL at 10:33

## 2025-01-06 RX ADMIN — ASPIRIN 81 MG: 81 TABLET, COATED ORAL at 09:29

## 2025-01-06 RX ADMIN — ACETAMINOPHEN 975 MG: 325 TABLET, FILM COATED ORAL at 05:09

## 2025-01-06 RX ADMIN — SODIUM CHLORIDE, SODIUM GLUCONATE, SODIUM ACETATE, POTASSIUM CHLORIDE, MAGNESIUM CHLORIDE, SODIUM PHOSPHATE, DIBASIC, AND POTASSIUM PHOSPHATE 125 ML/HR: .53; .5; .37; .037; .03; .012; .00082 INJECTION, SOLUTION INTRAVENOUS at 03:34

## 2025-01-06 RX ADMIN — LIDOCAINE 1 PATCH: 50 PATCH TOPICAL at 21:29

## 2025-01-06 RX ADMIN — BUSPIRONE HYDROCHLORIDE 15 MG: 10 TABLET ORAL at 09:12

## 2025-01-06 RX ADMIN — ENOXAPARIN SODIUM 40 MG: 40 INJECTION SUBCUTANEOUS at 09:14

## 2025-01-06 RX ADMIN — SENNOSIDES AND DOCUSATE SODIUM 1 TABLET: 50; 8.6 TABLET ORAL at 21:27

## 2025-01-06 RX ADMIN — PREGABALIN 150 MG: 75 CAPSULE ORAL at 09:12

## 2025-01-06 RX ADMIN — LUBIPROSTONE 8 MCG: 8 CAPSULE, GELATIN COATED ORAL at 09:26

## 2025-01-06 RX ADMIN — HYDROXYZINE HYDROCHLORIDE 25 MG: 25 TABLET ORAL at 15:37

## 2025-01-06 RX ADMIN — OXYCODONE HYDROCHLORIDE 10 MG: 10 TABLET ORAL at 18:02

## 2025-01-06 RX ADMIN — OXYCODONE HYDROCHLORIDE 10 MG: 10 TABLET ORAL at 22:28

## 2025-01-06 RX ADMIN — CEFAZOLIN SODIUM 2000 MG: 2 SOLUTION INTRAVENOUS at 13:08

## 2025-01-06 RX ADMIN — SERTRALINE HYDROCHLORIDE 200 MG: 100 TABLET ORAL at 21:27

## 2025-01-06 RX ADMIN — HYDROXYZINE HYDROCHLORIDE 25 MG: 25 TABLET ORAL at 09:14

## 2025-01-06 RX ADMIN — AMLODIPINE BESYLATE 5 MG: 5 TABLET ORAL at 09:13

## 2025-01-06 RX ADMIN — PANTOPRAZOLE SODIUM 40 MG: 40 TABLET, DELAYED RELEASE ORAL at 05:10

## 2025-01-06 RX ADMIN — PREGABALIN 150 MG: 75 CAPSULE ORAL at 15:37

## 2025-01-06 RX ADMIN — ARIPIPRAZOLE 10 MG: 10 TABLET ORAL at 09:12

## 2025-01-06 RX ADMIN — HYDROXYZINE HYDROCHLORIDE 25 MG: 25 TABLET ORAL at 21:28

## 2025-01-06 RX ADMIN — GUAIFENESIN AND DEXTROMETHORPHAN 10 ML: 100; 10 SYRUP ORAL at 21:29

## 2025-01-06 NOTE — PLAN OF CARE
Problem: OCCUPATIONAL THERAPY ADULT  Goal: Performs self-care activities at highest level of function for planned discharge setting.  See evaluation for individualized goals.  Description: Treatment Interventions: Functional transfer training, ADL retraining, UE strengthening/ROM, Endurance training, Cognitive reorientation, Patient/family training, Equipment evaluation/education, Compensatory technique education, Energy conservation, Activityengagement          See flowsheet documentation for full assessment, interventions and recommendations.   Outcome: Progressing  Note: Limitation: Decreased ADL status, Decreased UE ROM, Decreased UE strength, Decreased Safe judgement during ADL, Decreased endurance, Decreased cognition, Decreased high-level ADLs, Decreased self-care trans  Prognosis: Fair  Assessment: Pt seen for skilled OT session focused on ADLs, functional transfers and mobility, bed mobility. Pt w/ MIN assist supine>sit bed mobility w/ increased time and cues for log rolling techniques. Pt w/ MIN assist to don LSO brace. Pt w/ MIN assist sit>stand from bed w/ increased time to complete. Pt w/ MIN assist x1 functional mobility w/ RW to BSC. Pt completed pericare while seated. Pt w/ MIN assist functional mobility w/ RW to EOB. Pt w/ MIN assist sit>stand from bed. Pt w/ MIN assist functional mobility w/ RW to recliner chair w/ cues for controlled descent. Pt w/ setup grooming tasks. Pt w/ MIN assist UB ADLs and assist w/ LSO brace. Charles from restorative came to provide extension panel for patient for LSO, pt reports improvement in fit w/ extension panel. Pt w/ MOD assist LB Bathing and min assist steadying w/ RW to wash buttocks. Pt w/ MAX assist LB Dressing 2* impaired functional reach and unable to complete w/ compensatory techniques due to spinal precautions. Pt upright in recliner w/ LEs elevated at end of session. Pt w/ wound vac and LSO in position end of session and RN made aware. Pt continues to be  limited due to decreased strength and endurance, impaired balance, increased pain in back and left LE, impaired functional reach, decreased insight and safety awareness, spinal precautions, multiple lines, fall risk, all causing a decline in ADLs, functional transfers and mobility. Recommend level II moderate resources.   The patient's raw score on the AM-PAC Daily Activity Inpatient Short Form is 16. A raw score of less than 19 suggests the patient may benefit from discharge to post-acute rehabilitation services. Please refer to the recommendation of the Occupational Therapist for safe discharge planning.     Rehab Resource Intensity Level, OT: II (Moderate Resource Intensity)

## 2025-01-06 NOTE — RESTORATIVE TECHNICIAN NOTE
Restorative Technician Note      Patient Name: Samantha Rodriguez     Restorative Tech Visit Date: 01/06/25  Note Type: Mobility  Patient Position Upon Consult: Bedside chair  Mobility / Activity Provided: Assisted PTA with walking pt  Activity Performed: Ambulated; Range of motion  Assistive Device: Roller walker  Brace Applied: [place] LSO  Additional Brace Ordered: No  Patient Position When Brace Applied: Other (comment) (Brace already on)  Bracing Recommendations: None  Patient Position at End of Consult: All needs within reach; Supine; Bed/Chair alarm activated

## 2025-01-06 NOTE — ASSESSMENT & PLAN NOTE
Patient reports after getting back from rehab she has been struggling to walk secondary to her back pain  She typically ambulates with a walker  Continue PT/OT - recommending rehab. Pending safe dispo

## 2025-01-06 NOTE — ASSESSMENT & PLAN NOTE
SIRS suspected secondary to RSV infection  CXR no acute cardiopulmonary disease   Blood cultures no growth @ 72 hours  Urine culture with E. coli sensitive to cefazolin, denied urinary symptoms  If high fevers persist may need to consider drug fever and change Ancef to an alternative antibiotic  24 hours w/o documented fever. Last febrile 1/5 0530 tmax 101.2  Recommended to continue keflex x7 days on discharge

## 2025-01-06 NOTE — PHYSICAL THERAPY NOTE
Physical Therapy Treatment Note     01/06/25 1338   PT Last Visit   PT Visit Date 01/06/25   Note Type   Note Type Treatment for insurance authorization   Pain Assessment   Pain Assessment Tool 0-10   Pain Score 5   Pain Location/Orientation Location: Back;Location: Head   Precautions   Spinal Precautions yes   Restrictions/Precautions   Weight Bearing Precautions Per Order No   Braces or Orthoses LSO   Other Precautions Chair Alarm;Bed Alarm;Pain;Fall Risk;Spinal precautions;Multiple lines;Contact/isolation;Droplet precautions   General   Chart Reviewed Yes   Family/Caregiver Present No   Subjective   Subjective Pt. agreeable to PT. Pt. reported daughter does not want her to go back to her becuase it is too much for her with 5 kids and pt. also reported her son in law is not her caretaker anymore.   Bed Mobility   Sit to Supine 4  Minimal assistance   Additional items Assist x 1;Increased time required;LE management;Verbal cues   Additional Comments HOB flat and bed rails used for log roll   Transfers   Sit to Stand 5  Supervision   Additional items Assist x 1;Increased time required;Verbal cues;Armrests   Stand to Sit 5  Supervision   Additional items Assist x 1;Increased time required;Armrests;Verbal cues   Stand pivot 5  Supervision   Additional items Assist x 1;Increased time required;Verbal cues   Ambulation/Elevation   Gait pattern Forward Flexion;Narrow CALLY;Decreased foot clearance;Foward flexed;Short stride;Excessively slow;Decreased toe off;Decreased heel strike   Gait Assistance 5  Supervision  (CGA)   Additional items Assist x 1;Verbal cues   Assistive Device Rolling walker   Distance 40ft   Balance   Static Sitting Good   Dynamic Sitting Fair   Static Standing Fair   Dynamic Standing Fair -   Ambulatory Fair -   Endurance Deficit   Endurance Deficit Yes   Endurance Deficit Description pain, fatigue   Activity Tolerance   Activity Tolerance Patient tolerated treatment well   Nurse Made Aware yes    Assessment   Prognosis Fair   Problem List Decreased endurance;Decreased range of motion;Decreased strength;Decreased mobility;Pain;Orthopedic restrictions;Impaired judgement   Assessment Pt. needed decreased assist for overall mobility. Cues for hand placement for STS transfers. Donned and doff LSO during session. DOnned LSO to fit pt. correctly. Pt. noted with improved upright posture compared to previous sessions with this theapist during previous admission. pt. unable to ambulate long distance due to pain and fatigue. Pt. positioned back in bed with all needs within reach and bed alarm engaged. Will continue to follow per PT POC. Pt. is fucntioning below he baseline and needs A for mobility. SpO2 stats noted % and HR 68-72 bpm.   Barriers to Discharge None   Goals   Patient Goals None reported   STG Expiration Date 01/17/25   Plan   Treatment/Interventions Functional transfer training;Spoke to nursing;Gait training;Bed mobility;Equipment eval/education;Patient/family training;Spoke to case management   Progress Progressing toward goals   PT Frequency 3-5x/wk   Discharge Recommendation   Rehab Resource Intensity Level, PT II (Moderate Resource Intensity)   AM-PAC Basic Mobility Inpatient   Turning in Flat Bed Without Bedrails 3   Lying on Back to Sitting on Edge of Flat Bed Without Bedrails 3   Moving Bed to Chair 3   Standing Up From Chair Using Arms 3   Walk in Room 3   Climb 3-5 Stairs With Railing 3   Basic Mobility Inpatient Raw Score 18   Basic Mobility Standardized Score 41.05   [facility] Highest Level Of Mobility   -HLM Goal 6: Walk 10 steps or more   -HLM Achieved 7: Walk 25 feet or more   End of Consult   Patient Position at End of Consult All needs within reach;Bed/Chair alarm activated;Supine           Chris Vera PTA    An AM-PAC basic mobility standardized score less than 42.9 suggest the patient may benefit from discharge to post-acute rehab services.

## 2025-01-06 NOTE — CASE MANAGEMENT
Case Management Assessment & Discharge Planning Note    Patient name Samantha Rodriguez  Location South 2 /South 2 M* MRN [id number]  : 1965 Date 2025       Current Admission Date: 2025  Current Admission Diagnosis:Ambulatory dysfunction   Patient Active Problem List    Diagnosis Date Noted Date Diagnosed    Wound of sacral region 2025     RSV bronchitis 2025     Acute blood loss anemia 2024     Pain associated with surgical procedure 2024     Toxic encephalopathy 2024     Severe episode of recurrent major depressive disorder, without psychotic features ([facility]) 2024     Chronic low back pain, unspecified back pain laterality, unspecified whether sciatica present 2024     Opioid-induced constipation 2024     Weight gain 2024     Numbness 2023     Memory loss 2023     Hemiplegia, post-stroke ([facility]) 2022     Hypokalemia 2022     Slow transit constipation 03/15/2022     CVA (cerebral vascular accident) ([facility]) 2022     Mixed hyperlipidemia 2021     History of CVA (cerebrovascular accident) 2021     Dysphagia 2020     Ambulatory dysfunction 2020     Status post insertion of spinal cord stimulator 2020     SIRS (systemic inflammatory response syndrome) ([facility]) 2020     Tardive dyskinesia 2020     MIMI (obstructive sleep apnea)      Obesity, morbid ([facility]) 2020     Post laminectomy syndrome 10/07/2019     Bipolar I disorder, most recent episode depressed ([facility]) 2019 09/15/2023    Spinal stenosis of lumbar region with neurogenic claudication 2018     Lumbar radiculopathy 2017     Chronic pain disorder 2017     Lumbar spondylosis 10/31/2016     Generalized anxiety disorder with panic attacks 10/26/2016     Bipolar disorder ([facility]) 2015     PTSD (post-traumatic stress disorder) 2015     Panic disorder without agoraphobia 2015     Tremor  06/12/2014     Migraine 05/27/2014     GERD without esophagitis 05/01/2014     Cognitive disorder 04/18/2014     Overactive bladder 09/26/2013     Mood insomnia (HCC) 01/31/2013     Urinary incontinence 09/24/2012     Vitamin D deficiency 09/18/2012     Fibromyalgia 09/14/2012     Essential hypertension 09/14/2012       LOS (days): 3  Geometric Mean LOS (GMLOS) (days):   Days to GMLOS:     OBJECTIVE:  PATIENT READMITTED TO HOSPITAL  Risk of Unplanned Readmission Score: 30.84         Current admission status: Inpatient  Referral Reason: Option Paperwork, Area of Aging Referral (Reoption paperwork sent 01/06/2024)    Pref[place] Pharmacy:   [place] [address]  [address] [address]  Phone: [phone] Fax: [phone]    [facility]/pharmacy #[id number] - [place]ET[place] - [address]  [address] [place] [address]  Phone: [phone] Fax: [phone]    [facility] [place] - [address]  [address]  [place] [address]  Phone: [phone] Fax: [phone]    [facility] [place], [place] - [address]  [address] [address]  Phone: [phone] Fax: [phone]    [facility] [place] [facility] [place] - [address]  [address]  [place] [address]  Phone: [phone] Fax: [phone]    Primary Care Provider: Glo Valente DO    Primary Insurance: [employer]IC[employer] [facility] RE[facility]  Secondary Insurance: [facility]    ASSESSMENT:  Active Health Care Proxies       Tunde Rodriguez Health Care Representative - Daughter   Primary Phone: [phone] (Mobile)                 Advance Directives  Does patient have a Health Care POA?: No  Does patient have Advance Directives?: No  Primary Contact: Tunde Rodriguez (Daughter)  [phone] (Mobile)    Readmission Root Cause  30 Day Readmission:  Yes  During your hospital stay, did someone (provider, nurse, ) explain your care to you in a way you could understand?: Yes  Did you feel medically stable to leave the hospital?: Yes  Were you able to pay for your medication at the pharmacy?: Yes  Did you have reliable transportation to take you to your appointments?: Yes  During previous admission, was a post-acute recommendation made?: Yes  What post-acute resources were offered?: STR  Patient was readmitted due to: Ambulatory Dysfunction  Action Plan: STR readmission    Patient Information  Admitted from:: Home  Mental Status: Alert  During Assessment patient was accompanied by: Not accompanied during assessment  Assessment information provided by:: Patient  Primary Caregiver: Self  Support Systems: Self, Children, Daughter  County of Residence: [place]  What city do you live in?: [place]  Home entry access options. Select all that apply.: Stairs  Number of steps to enter home.: 2  Type of Current Residence: 2 story home  Living Arrangements: Lives w/ Daughter  Is patient a ?: No    Activities of Daily Living Prior to Admission  Functional Status: Assistance  Completes ADLs independently?: Yes  Ambulates independently?: Yes  Does patient use assisted devices?: Yes  Assisted Devices (DME) used: Rollator  Does patient currently own DME?: Yes  What DME does the patient currently own?: Rollator  Does patient have a history of Outpatient Therapy (PT/OT)?: No  Does the patient have a history of Short-Term Rehab?: Yes ([facility] [place])  Does patient have a history of HHC?: Yes  Does patient currently have HHC?: No    Patient Information Continued  Income Source: SSI/SSD  Does patient have prescription coverage?: Yes  Does patient receive dialysis treatments?: No  Does patient have a history of substance abuse?: No  Does patient have a history of Mental Health Diagnosis?: Yes  Is patient receiving treatment for mental health?: Yes  Has patient received  inpatient treatment related to mental health in the last 2 years?: Yes (Psychiatric inpatient, November 2024; optioning paperwork completed)    Means of Transportation  Means of Transport to Appts:: Family transport    DISCHARGE DETAILS:    Discharge planning discussed with:: Patient  Freedom of Choice: Yes  Comments - Freedom of Choice: CM reviewed therapy recommendations for STR; CM also reviewed STR choices. Patient agreeable to STR with [facility].  CM contacted family/caregiver?: Yes (CM attempted to contact patient's daughter via phone contact.)  Were Treatment Team discharge recommendations reviewed with patient/caregiver?: Yes  Did patient/caregiver verbalize understanding of patient care needs?: N/A- going to facility    Contacts  Patient Contacts: Cyrie  Relationship to Patient:: Family  Contact Method: Phone  Phone Number: [phone]  Reason/Outcome: Emergency Contact, Discharge Planning    Requested Home Health Care         Is the patient interested in HHC at discharge?: No    DME Referral Provided  Referral made for DME?: No    Other Referral/Resources/Interventions Provided:  Interventions: Short Term Rehab, Other (Specify)  Referral Comments: STR, patient chose STR at [facility]; [facility] paperwork resubmitted  Government Services:: [facility] Agency on Aging    Treatment Team Recommendation: Short Term Rehab  Discharge Destination Plan:: Short Term Rehab           Additional Comments: CM spoke with [facility] office of aging (PH: [phone]); CM received October 2024 optioning paperwork.CM resubmitted paperwork (November 2024 psychiatric admission). CM sent via email. CM completed assessment with patient at bedside. Patient confirmed post STR, patient was home with daughter, son in law and grandchildren. Patient confirmed has RW for home. Patient confirmed would follow up with [facility] in [place]. Patient confirmed prior admission to [facility]. CM attempted to reach patient's  padmini via phone contact.  CM provided patient with choice list; patient agreeable to [facility] updated inpatient therapy notes updated. CM requested NPI information from Mora ESQUIVEL.  CM to follow for further discharge planning.    CM submitted Wound vac to patient's paper file to complete. CM to follow.     Concepcion Banegas,

## 2025-01-06 NOTE — ASSESSMENT & PLAN NOTE
2 weeks s/p removal hardware L2-S1, posterior fusion L5-S1, revision instrumentation L2-pelvis, bilateral S2 alar screws with Dr. Bills and Dr. Stevens on 12/19.  Overall improvement.  CRP improving from 231.1 > 224.7 > 198.9.   Leukocytosis resolved and patient has been afebrile   Also being treated for RSV bronchitis per SLIM  Currently on ancef q8h  Maintain Prevena iVAC over incision site and sacral wound on back  WBAT bilateral upper and lower extremities  LSO brace when OOB  PT/OT, recommending level 2 rehab  Pain control, DVT ppx per primary  Stable for d/c from orthopedic standpoint. Follow up as outpt with Dr. Bills.

## 2025-01-06 NOTE — RESTORATIVE TECHNICIAN NOTE
Restorative Technician Note      Patient Name: Samantha Rodriguez     Restorative Tech Visit Date: 01/06/25  Note Type: Mobility  Patient Position Upon Consult: Bedside chair  Mobility / Activity Provided: Assisted OT with adjusting brace  Activity Performed: Stood; Repositioned  Assistive Device: Roller walker  Brace Applied: [place] LSO  Additional Brace Ordered: No  Patient Position When Brace Applied: Standing  Bracing Recommendations: None  Patient Position at End of Consult: All needs within reach; Bedside chair

## 2025-01-06 NOTE — PROGRESS NOTES
Progress Note - Orthopedics   Name: Samantha Rodriguez 59 y.o. female I MRN: [id number]  Unit/Bed#: [place] [address] -01 I Date of Admission: 1/2/2025   Date of Service: 1/6/2025 I Hospital Day: 3     Assessment & Plan  Ambulatory dysfunction  2 weeks s/p removal hardware L2-S1, posterior fusion L5-S1, revision instrumentation L2-pelvis, bilateral S2 alar screws with Dr. Bills and Dr. Stevens on 12/19.  Overall improvement.  CRP improving from 231.1 > 224.7 > 198.9.   Leukocytosis resolved and patient has been afebrile   Also being treated for RSV bronchitis per SLIM  Currently on ancef q8h  Maintain Prevena iVAC over incision site and sacral wound on back  WBAT bilateral upper and lower extremities  LSO brace when OOB  PT/OT, recommending level 2 rehab  Pain control, DVT ppx per primary  Stable for d/c from orthopedic standpoint. Follow up as outpt with Dr. Bills.   Lumbar radiculopathy    SIRS (systemic inflammatory response syndrome) (HCC)    Wound of sacral region    RSV bronchitis      Ok for discharge from Orthopedics service perspective.    Subjective   59 y.o.female seen and evaluated. She notes overall improvement in her low back pain. Her biggest complaint is her cough. She denies an LE numbness/tingling. No fever/chills.    Objective :  Temp:  [97.8 °F (36.6 °C)-99.4 °F (37.4 °C)] 98.3 °F (36.8 °C)  HR:  [56-69] 65  BP: (121-138)/(67-87) 129/78  Resp:  [20] 20  SpO2:  [94 %-100 %] 98 %  O2 Device: None (Room air)    Physical Exam  Musculoskeletal: Lumbar spine:  iVAC in place with adequate seal. No drainage appreciated in canister.  No surrounding TTP or palpable collection/fluctuance.  Left motor: Hip flexion 3/5, knee extension 3/5, knee flexion 4/5, dorsiflexion 3/5, plantar flexion 4/5, EHL 3/5  Right motor: Hip flexion 4/5, knee extension 4/5, knee flexion 4/5, dorsiflexion 3/5, plantarflexion 4/5, EHL 3/5  Sensation intact L2-S1 b/l LEs  2+ DP pulses bilaterally      Lab Results: I have reviewed  the following results:  Recent Labs       0000 01/03/25  1829 01/04/25  0539 01/04/25  1258 01/05/25  0500 01/06/25  0511   WBC  --   --  6.61  --  5.54 5.63   HGB  --   --  9.7*  --  9.3* 9.2*   HCT  --   --  30.9*  --  29.9* 29.9*   PLT   < >  --  456*  --  473* 460*   BUN  --   --  15  --  12 8   CREATININE  --   --  0.98  --  0.69 0.59*   ESR  --  81*  --   --   --   --    CRP  --  231.1*  --    < > 198.9*  --     < > = values in this interval not displayed.     Blood Culture:    Lab Results   Component Value Date    BLOODCX No Growth at 72 hrs. 01/03/2025    BLOODCX No Growth at 72 hrs. 01/03/2025

## 2025-01-06 NOTE — PLAN OF CARE
Problem: PHYSICAL THERAPY ADULT  Goal: Performs mobility at highest level of function for planned discharge setting.  See evaluation for individualized goals.  Description: Treatment/Interventions: Functional transfer training, LE strengthening/ROM, Therapeutic exercise, Endurance training, Patient/family training, Bed mobility, Gait training, Spoke to nursing, OT          See flowsheet documentation for full assessment, interventions and recommendations.  Outcome: Progressing  Note: Prognosis: Fair  Problem List: Decreased endurance, Decreased range of motion, Decreased strength, Decreased mobility, Pain, Orthopedic restrictions, Impaired judgement  Assessment: Pt. needed decreased assist for overall mobility. Cues for hand placement for STS transfers. Donned and doff LSO during session. DOnned LSO to fit pt. correctly. Pt. noted with improved upright posture compared to previous sessions with this theapist during previous admission. pt. unable to ambulate long distance due to pain and fatigue. Pt. positioned back in bed with all needs within reach and bed alarm engaged. Will continue to follow per PT POC. Pt. is fucntioning below he baseline and needs A for mobility. SpO2 stats noted % and HR 68-72 bpm.  Barriers to Discharge: None     Rehab Resource Intensity Level, PT: II (Moderate Resource Intensity)    See flowsheet documentation for full assessment.

## 2025-01-06 NOTE — OCCUPATIONAL THERAPY NOTE
Occupational Therapy Progress Note     Patient Name: Samantha Rodriguez  Today's Date: 1/6/2025  Problem List  Principal Problem:    Ambulatory dysfunction  Active Problems:    Lumbar radiculopathy    Bipolar disorder (HCC)    Essential hypertension    SIRS (systemic inflammatory response syndrome) (HCC)    Wound of sacral region    RSV bronchitis        01/06/25 1127   OT Last Visit   OT Visit Date 01/06/25   Note Type   Note Type Treatment for insurance authorization   Pain Assessment   Pain Assessment Tool 0-10   Pain Score 9   Pain Location/Orientation Location: Back;Location: Hip;Orientation: Left   Hospital Pain Intervention(s) Ambulation/increased activity;Repositioned;Emotional support   Restrictions/Precautions   Weight Bearing Precautions Per Order No   Braces or Orthoses LSO  (when OOB per ortho)   Other Precautions Chair Alarm;Bed Alarm;Cognitive;Contact/isolation;Droplet precautions;Spinal precautions;Fall Risk;Pain;Multiple lines  (wound vac)   ADL   Where Assessed Chair   Grooming Assistance 5  Supervision/Setup   Grooming Deficit Setup;Supervision/safety;Verbal cueing;Increased time to complete;Wash/dry face;Wash/dry hands   UB Bathing Assistance 5  Supervision/Setup   UB Bathing Deficit Setup;Verbal cueing;Supervision/safety;Increased time to complete   UB Bathing Comments increased time   LB Bathing Assistance 3  Moderate Assistance   LB Bathing Deficit Setup;Steadying;Supervision/safety;Verbal cueing;Increased time to complete;Right lower leg including foot;Left lower leg including foot;Buttocks   LB Bathing Comments impaired functional reach and balance w/ spinal precautions   UB Dressing Assistance 4  Minimal Assistance   UB Dressing Deficit Setup;Verbal cueing;Increased time to complete;Supervision/safety  (assist w/ LSO)   LB Dressing Assistance 2  Maximal Assistance   LB Dressing Deficit Setup;Steadying;Requires assistive device for steadying;Supervision/safety;Verbal cueing;Increased time to  "complete;Don/doff L sock;Don/doff R sock   LB Dressing Comments impaired ability for compensatory techniques, would benefit from LHAE   Toileting Assistance  4  Minimal Assistance   Toileting Deficit Setup;Steadying;Verbal cueing;Supervison/safety;Increased time to complete;Clothing management up;Clothing management down;Perineal hygiene;Bedside commode   Toileting Comments pericare while seated   Functional Standing Tolerance   Time 2 minutes   Activity w/ MIN assist steadying support w/ ADLs   Comments RW support   Bed Mobility   Supine to Sit 4  Minimal assistance   Additional items Assist x 1;Increased time required;Verbal cues;LE management;Bedrails;HOB elevated  (log roll techniques)   Additional Comments cues for positioning and safety   Transfers   Sit to Stand 4  Minimal assistance   Additional items Assist x 1;Increased time required;Verbal cues;Armrests   Stand to Sit 4  Minimal assistance   Additional items Assist x 1;Increased time required;Verbal cues;Armrests   Stand pivot 4  Minimal assistance   Additional items Assist x 1;Increased time required;Verbal cues;Armrests   Toilet transfer 4  Minimal assistance   Additional items Assist x 1;Increased time required;Verbal cues;Commode   Additional Comments cues for hand placement and positioning and controlled descent   Functional Mobility   Functional Mobility 4  Minimal assistance   Additional Comments assist x1 w/ increased time to complete and cues for positioning and safety and increased pain in homelike environment   Additional items Rolling walker   Subjective   Subjective \"I am doing a little better\"   Cognition   Overall Cognitive Status WFL   Arousal/Participation Alert;Responsive;Cooperative   Attention Attends with cues to redirect   Orientation Level Oriented to person;Oriented to time;Oriented to place   Memory Decreased recall of precautions   Following Commands Follows one step commands with increased time or repetition   Comments pleasant " and cooperative, engages in appropriate conversations; receptive spinal precautions   Additional Activities   Additional Activities   (education on positioning and safety)   Additional Activities Comments pt receptive   Activity Tolerance   Activity Tolerance Patient limited by fatigue;Patient limited by pain;Treatment limited secondary to medical complications (Comment)   Medical Staff Made Aware appropriate to ses per RN Herlinda   Assessment   Assessment Pt seen for skilled OT session focused on ADLs, functional transfers and mobility, bed mobility. Pt w/ MIN assist supine>sit bed mobility w/ increased time and cues for log rolling techniques. Pt w/ MIN assist to don LSO brace. Pt w/ MIN assist sit>stand from bed w/ increased time to complete. Pt w/ MIN assist x1 functional mobility w/ RW to BSC. Pt completed pericare while seated. Pt w/ MIN assist functional mobility w/ RW to EOB. Pt w/ MIN assist sit>stand from bed. Pt w/ MIN assist functional mobility w/ RW to recliner chair w/ cues for controlled descent. Pt w/ setup grooming tasks. Pt w/ MIN assist UB ADLs and assist w/ LSO brace. Charles from [facility] came to provide extension panel for patient for LSO, pt reports improvement in fit w/ extension panel. Pt w/ MOD assist LB Bathing and min assist steadying w/ RW to wash buttocks. Pt w/ MAX assist LB Dressing 2* impaired functional reach and unable to complete w/ compensatory techniques due to spinal precautions. Pt upright in recliner w/ LEs elevated at end of session. Pt w/ wound vac and LSO in position end of session and RN made aware. Pt continues to be limited due to decreased strength and endurance, impaired balance, increased pain in back and left LE, impaired functional reach, decreased insight and safety awareness, spinal precautions, multiple lines, fall risk, all causing a decline in ADLs, functional transfers and mobility. Recommend level II moderate resources.   The patient's raw score on the  AM-PAC Daily Activity Inpatient Short Form is 16. A raw score of less than 19 suggests the patient may benefit from discharge to post-acute rehabilitation services. Please refer to the recommendation of the Occupational Therapist for safe discharge planning.   Plan   Treatment Interventions ADL retraining;UE strengthening/ROM;Functional transfer training;Endurance training;Cognitive reorientation;Patient/family training;Equipment evaluation/education;Compensatory technique education;Energy conservation;Activityengagement   Goal Expiration Date 01/17/25   OT Treatment Day 1   OT Frequency 3-5x/wk   Discharge Recommendation   Rehab Resource Intensity Level, OT II (Moderate Resource Intensity)   AM-PAC Daily Activity Inpatient   Lower Body Dressing 1   Bathing 2   Toileting 3   Upper Body Dressing 3   Grooming 3   Eating 4   Daily Activity Raw Score 16   Daily Activity Standardized Score (Calc for Raw Score >=11) 35.96   AM-PAC Applied Cognition Inpatient   Following a Speech/Presentation 4   Understanding Ordinary Conversation 4   Taking Medications 3   Remembering Where Things Are Placed or Put Away 3   Remembering List of 4-5 Errands 2   Taking Care of Complicated Tasks 2   Applied Cognition Raw Score 18   Applied Cognition Standardized Score 38.07   Modified [place] Scale   Modified [place] Scale 4   End of Consult   Education Provided Yes   Patient Position at End of Consult Bed/Chair alarm activated;All needs within reach;Bedside chair   Nurse Communication Nurse aware of consult     Documentation completed by: Anju Dunham MS, OTR/L

## 2025-01-06 NOTE — PHYSICAL THERAPY NOTE
Physical Therapy Treatment Note     01/06/25 1338   PT Last Visit   PT Visit Date 01/06/25   Note Type   Note Type Treatment for insurance authorization   Pain Assessment   Pain Assessment Tool 0-10   Pain Score 5   Pain Location/Orientation Location: Back;Location: Head   Precautions   Spinal Precautions yes   Restrictions/Precautions   Weight Bearing Precautions Per Order No   Braces or Orthoses LSO   Other Precautions Chair Alarm;Bed Alarm;Pain;Fall Risk;Spinal precautions;Multiple lines;Contact/isolation;Droplet precautions   General   Chart Reviewed Yes   Family/Caregiver Present No   Subjective   Subjective Pt. agreeable to PT. Pt. reported daughter does not want her to go back to her becuase it is too much for her with 5 kids and pt. also reported her son in law is not her caretaker anymore.   Bed Mobility   Sit to Supine 4  Minimal assistance   Additional items Assist x 1;Increased time required;LE management;Verbal cues   Additional Comments HOB flat and bed rails used for log roll   Transfers   Sit to Stand 5  Supervision   Additional items Assist x 1;Increased time required;Verbal cues;Armrests   Stand to Sit 5  Supervision   Additional items Assist x 1;Increased time required;Armrests;Verbal cues   Stand pivot 5  Supervision   Additional items Assist x 1;Increased time required;Verbal cues   Ambulation/Elevation   Gait pattern Forward Flexion;Narrow CALLY;Decreased foot clearance;Foward flexed;Short stride;Excessively slow;Decreased toe off;Decreased heel strike   Gait Assistance 5  Supervision  (CGA)   Additional items Assist x 1;Verbal cues   Assistive Device Rolling walker   Distance 40ft   Balance   Static Sitting Good   Dynamic Sitting Fair   Static Standing Fair   Dynamic Standing Fair -   Ambulatory Fair -   Endurance Deficit   Endurance Deficit Yes   Endurance Deficit Description pain, fatigue   Activity Tolerance   Activity Tolerance Patient tolerated treatment well   Nurse Made Aware yes    Assessment   Prognosis Fair   Problem List Decreased endurance;Decreased range of motion;Decreased strength;Decreased mobility;Pain;Orthopedic restrictions;Impaired judgement   Assessment Pt. needed decreased assist for overall mobility. Cues for hand placement for STS transfers. Donned and doff LSO during session. DOnned LSO to fit pt. correctly. Pt. noted with improved upright posture compared to previous sessions with this theapist during previous admission. pt. unable to ambulate long distance due to pain and fatigue. Pt. positioned back in bed with all needs within reach and bed alarm engaged. Will continue to follow per PT POC. Pt. is fucntioning below he baseline and needs A for mobility. SpO2 stats noted % and HR 68-72 bpm.   Barriers to Discharge None   Goals   Patient Goals None reported   STG Expiration Date 01/17/25   Plan   Treatment/Interventions Functional transfer training;Spoke to nursing;Gait training;Bed mobility;Equipment eval/education;Patient/family training;Spoke to case management   Progress Progressing toward goals   PT Frequency 3-5x/wk   Discharge Recommendation   Rehab Resource Intensity Level, PT II (Moderate Resource Intensity)   AM-PAC Basic Mobility Inpatient   Turning in Flat Bed Without Bedrails 3   Lying on Back to Sitting on Edge of Flat Bed Without Bedrails 3   Moving Bed to Chair 3   Standing Up From Chair Using Arms 3   Walk in Room 3   Climb 3-5 Stairs With Railing 3   Basic Mobility Inpatient Raw Score 18   Basic Mobility Standardized Score 41.05   [facility] Highest Level Of Mobility   -HLM Goal 6: Walk 10 steps or more   -HLM Achieved 7: Walk 25 feet or more   End of Consult   Patient Position at End of Consult All needs within reach;Bed/Chair alarm activated;Supine         Chris Vera PTA    An AM-PAC basic mobility standardized score less than 42.9 suggest the patient may benefit from discharge to post-acute rehab services.

## 2025-01-06 NOTE — PROGRESS NOTES
Progress Note - Hospitalist   Name: Samantha Rodriguez 59 y.o. female I MRN: [id number]  Unit/Bed#: [place] [address] -01 I Date of Admission: 1/2/2025   Date of Service: 1/6/2025 I Hospital Day: 3    Assessment & Plan  Ambulatory dysfunction  Patient reports after getting back from rehab she has been struggling to walk secondary to her back pain  She typically ambulates with a walker  Continue PT/OT - recommending rehab. Pending safe dispo  Lumbar radiculopathy  Status post removal L2-S1 hardware, with new L5-S1 fusion performed by Dr. Stevens on 12/19 without complication  Pt reports following procedure and rehab she has continued to have pain limiting her ambulation  She denies any lower extremity numbness/weakness, saddle parenthesis, bowel or bladder retention/incontinence   Reviewed CT scan with contrast done on admission with radiologist which did not show any central canal narrowing  Orthopedics notes exam similar to prior documentation   Pain control with oxycodone as she was not getting any relief with home morphine  Continue multimodal pain regimen   PT/OT  SIRS (systemic inflammatory response syndrome) (HCC)  SIRS suspected secondary to RSV infection  CXR no acute cardiopulmonary disease   Blood cultures no growth @ 72 hours  Urine culture with E. coli sensitive to cefazolin, denied urinary symptoms  If high fevers persist may need to consider drug fever and change Ancef to an alternative antibiotic  24 hours w/o documented fever. Last febrile 1/5 0530 tmax 101.2  Recommended to continue keflex x7 days on discharge   Essential hypertension  Continue amlodipine   Bipolar disorder (HCC)  Mood stable  Continue abilify, zoloft, buspar, minipress  Wound of sacral region  Sacral wound distal to previous incision.  Orthopedist does not feel represents dehiscence but more likely sacral wound.  Preveena vac placed   Empiric course of antibiotics with IV Ancef to treat possibly early superficial infection around that  Racquel is a 29 year old female here for an obstetrics check.  She is      .  Gestational age: 22w6d      She denies bleeding.  She denies cramping  She reports fetal movement       See Prenatal Flowsheet for additional comments.   she has been site  RSV bronchitis  Worsening cough and wheezing today  Start IV steroids daily plus scheduled nebulizers  Continue antitussives  Supportive measures    VTE Pharmacologic Prophylaxis: VTE Score: 3 Moderate Risk (Score 3-4) - Pharmacological DVT Prophylaxis Ordered: enoxaparin (Lovenox).    Mobility:   Basic Mobility Inpatient Raw Score: 11  JH-HLM Goal: 4: Move to chair/commode  JH-HLM Achieved: 3: Sit at edge of bed  JH-HLM Goal NOT achieved. Continue with multidisciplinary rounding and encourage appropriate mobility to improve upon -HLM goals.    Patient Centered Rounds: I performed bedside rounds with nursing staff today.   Discussions with Specialists or Other Care Team Provider: Ortho     Education and Discussions with Family / Patient:  unable to contact daughter/son.     Current Length of Stay: 3 day(s)  Current Patient Status: Inpatient   Certification Statement: The patient will continue to require additional inpatient hospital stay due to continued IV steroids, nebs, pending safe dispo  Discharge Plan: Anticipate discharge in 24-48 hrs to discharge location to be determined pending rehab evaluations.    Code Status: Level 1 - Full Code    Subjective   Seen and examined. Reports not feeling well today. States she has a lot of congestion and coughing. Requesting her Ingrezza be sent to the hospital as her daughter is unable to bring this medication in for her.    Objective :  Temp:  [97.8 °F (36.6 °C)-99.4 °F (37.4 °C)] 98.3 °F (36.8 °C)  HR:  [56-77] 65  BP: (110-138)/(67-87) 129/78  Resp:  [18-20] 20  SpO2:  [94 %-100 %] 98 %  O2 Device: None (Room air)    Body mass index is 39.16 kg/m².     Input and Output Summary (last 24 hours):     Intake/Output Summary (Last 24 hours) at 1/6/2025 0738  Last data filed at 1/6/2025 0601  Gross per 24 hour   Intake 3772.08 ml   Output 1700 ml   Net 2072.08 ml       Physical Exam  Constitutional:       General: She is not in acute distress.      Appearance: She is ill-appearing. She is not toxic-appearing.   HENT:      Nose: Congestion present.      Mouth/Throat:      Mouth: Mucous membranes are moist.   Eyes:      Conjunctiva/sclera: Conjunctivae normal.   Cardiovascular:      Heart sounds: Normal heart sounds. No murmur heard.     No friction rub. No gallop.   Pulmonary:      Breath sounds: Wheezing present.   Abdominal:      General: There is no distension.      Palpations: Abdomen is soft.      Tenderness: There is no abdominal tenderness.   Musculoskeletal:      Right lower leg: No edema.      Left lower leg: No edema.   Skin:     General: Skin is warm.   Neurological:      Mental Status: Mental status is at baseline.         Lines/Drains:      Lab Results: I have reviewed the following results:   Results from last 7 days   Lab Units 01/06/25  0511 01/04/25  0539 01/03/25  0315 01/02/25  2200   WBC Thousand/uL 5.63   < > 9.50 11.19*   HEMOGLOBIN g/dL 9.2*   < > 9.3* 10.5*   HEMATOCRIT % 29.9*   < > 28.7* 32.7*   PLATELETS Thousands/uL 460*   < > 405* 434*   BANDS PCT %  --   --   --  1   SEGS PCT %  --   --  81*  --    LYMPHO PCT %  --   --  9* 2*   MONO PCT %  --   --  8 7   EOS PCT %  --   --  1 0    < > = values in this interval not displayed.     Results from last 7 days   Lab Units 01/06/25  0511   SODIUM mmol/L 140   POTASSIUM mmol/L 3.7   CHLORIDE mmol/L 103   CO2 mmol/L 29   BUN mg/dL 8   CREATININE mg/dL 0.59*   ANION GAP mmol/L 8   CALCIUM mg/dL 8.2*   GLUCOSE RANDOM mg/dL 101         Results from last 7 days   Lab Units 01/06/25  0720 01/05/25  1611 01/04/25  2110   POC GLUCOSE mg/dl 102 163* 119         Results from last 7 days   Lab Units 01/03/25  0315   LACTIC ACID mmol/L 0.8   PROCALCITONIN ng/ml 0.36*       Recent Cultures (last 7 days):   Results from last 7 days   Lab Units 01/03/25  0626 01/03/25  0316   BLOOD CULTURE   --  No Growth at 72 hrs.  No Growth at 72 hrs.   URINE CULTURE  >100,000 cfu/ml Escherichia coli*  50,000-59,000  cfu/ml  --        Imaging Results Review: I reviewed radiology reports from this admission including: chest xray and xray(s).  Other Study Results Review: No additional pertinent studies reviewed.    Last 24 Hours Medication List:     Current Facility-Administered Medications:     acetaminophen (TYLENOL) tablet 975 mg, Q8H KESHIA    aluminum-magnesium hydroxide-simethicone (MAALOX) oral suspension 30 mL, Q4H PRN    amLODIPine (NORVASC) tablet 5 mg, Daily    ARIPiprazole (ABILIFY) tablet 10 mg, Daily    aspirin (ECOTRIN LOW STRENGTH) EC tablet 81 mg, Daily    atorvastatin (LIPITOR) tablet 40 mg, Daily With Dinner    bisacodyl (DULCOLAX) rectal suppository 10 mg, Daily PRN    busPIRone (BUSPAR) tablet 15 mg, TID    ceFAZolin (ANCEF) IVPB (premix in dextrose) 2,000 mg 50 mL, Q8H, Last Rate: 2,000 mg (01/06/25 0509)    dextromethorphan-guaiFENesin (ROBITUSSIN DM) oral syrup 10 mL, TID    enoxaparin (LOVENOX) subcutaneous injection 40 mg, Daily    HYDROmorphone (DILAUDID) injection 0.5 mg, Q4H PRN    hydrOXYzine HCL (ATARAX) tablet 25 mg, TID    ipratropium-albuterol (DUO-NEB) 0.5-2.5 mg/3 mL inhalation solution 3 mL, Q6H PRN    lidocaine (LIDODERM) 5 % patch 1 patch, Daily    lubiprostone (AMITIZA) capsule 8 mcg, BID    methylPREDNISolone sodium succinate (Solu-MEDROL) injection 40 mg, Daily    multi-electrolyte (PLASMALYTE-A/ISOLYTE-S PH 7.4) IV solution, Continuous, Last Rate: 125 mL/hr (01/06/25 0334)    ondansetron (ZOFRAN) injection 4 mg, Q6H PRN    oxyCODONE (ROXICODONE) IR tablet 5 mg, Q4H PRN **OR** oxyCODONE (ROXICODONE) immediate release tablet 10 mg, Q4H PRN    pantoprazole (PROTONIX) EC tablet 40 mg, Early Morning    pneumococcal 20-cheryl conj vacc (PREVNAR 20) IM Injection 0.5 mL, Once PRN    polyethylene glycol (MIRALAX) packet 17 g, Daily    prazosin (MINIPRESS) capsule 2 mg, HS    pregabalin (LYRICA) capsule 150 mg, TID    senna-docusate sodium (SENOKOT S) 8.6-50 mg per tablet 1 tablet, BID    sertraline  (ZOLOFT) tablet 200 mg, HS    tiZANidine (ZANAFLEX) tablet 4 mg, Q8H PRN    Valbenazine Tosylate CAPS 1 capsule, Daily    Administrative Statements   Today, Patient Was Seen By: Lory Masterson PA-C    **Please Note: This note may have been constructed using a voice recognition system.**

## 2025-01-06 NOTE — PLAN OF CARE
Problem: PAIN - ADULT  Goal: Verbalizes/displays adequate comfort level or baseline comfort level  Description: Interventions:  - Encourage patient to monitor pain and request assistance  - Assess pain using appropriate pain scale  - Administer analgesics based on type and severity of pain and evaluate response  - Implement non-pharmacological measures as appropriate and evaluate response  - Consider cultural and social influences on pain and pain management  - Notify physician/advanced practitioner if interventions unsuccessful or patient reports new pain  Outcome: Progressing     Problem: INFECTION - ADULT  Goal: Absence or prevention of progression during hospitalization  Description: INTERVENTIONS:  - Assess and monitor for signs and symptoms of infection  - Monitor lab/diagnostic results  - Monitor all insertion sites, i.e. indwelling lines, tubes, and drains  - Monitor endotracheal if appropriate and nasal secretions for changes in amount and color  - [place] appropriate cooling/warming therapies per order  - Administer medications as ordered  - Instruct and encourage patient and family to use good hand hygiene technique  - Identify and instruct in appropriate isolation precautions for identified infection/condition  Outcome: Progressing     Problem: SAFETY ADULT  Goal: Patient will remain free of falls  Description: INTERVENTIONS:  - Educate patient/family on patient safety including physical limitations  - Instruct patient to call for assistance with activity   - Consult OT/PT to assist with strengthening/mobility   - Keep Call bell within reach  - Keep bed low and locked with side rails adjusted as appropriate  - Keep care items and personal belongings within reach  - Initiate and maintain comfort rounds  - Make Fall Risk Sign visible to staff  - Offer Toileting every 2 Hours, in advance of need  - Initiate/Maintain bed alarm  - Obtain necessary fall risk management equipment: bed alarms   - Apply  yellow socks and bracelet for high fall risk patients  - Consider moving patient to room near nurses station  Outcome: Progressing  Goal: Maintain or return to baseline ADL function  Description: INTERVENTIONS:  -  Assess patient's ability to carry out ADLs; assess patient's baseline for ADL function and identify physical deficits which impact ability to perform ADLs (bathing, care of mouth/teeth, toileting, grooming, dressing, etc.)  - Assess/evaluate cause of self-care deficits   - Assess range of motion  - Assess patient's mobility; develop plan if impaired  - Assess patient's need for assistive devices and provide as appropriate  - Encourage maximum independence but intervene and supervise when necessary  - Involve family in performance of ADLs  - Assess for home care needs following discharge   - Consider OT consult to assist with ADL evaluation and planning for discharge  - Provide patient education as appropriate  Outcome: Progressing  Goal: Maintains/Returns to pre admission functional level  Description: INTERVENTIONS:  - Perform AM-PAC 6 Click Basic Mobility/ Daily Activity assessment daily.  - Set and communicate daily mobility goal to care team and patient/family/caregiver.   - Collaborate with rehabilitation services on mobility goals if consulted  - Perform Range of Motion 4 times a day.  - Reposition patient every 2 hours.  - Dangle patient 3 times a day  - Stand patient 3 times a day  - Ambulate patient 3 times a day  - Out of bed to chair 3 times a day   - Out of bed for meals 3 times a day  - Out of bed for toileting  - Record patient progress and toleration of activity level   Outcome: Progressing     Problem: DISCHARGE PLANNING  Goal: Discharge to home or other facility with appropriate resources  Description: INTERVENTIONS:  - Identify barriers to discharge w/patient and caregiver  - Arrange for needed discharge resources and transportation as appropriate  - Identify discharge learning needs  (meds, wound care, etc.)  - Arrange for interpretive services to assist at discharge as needed  - Refer to Case Management Department for coordinating discharge planning if the patient needs post-hospital services based on physician/advanced practitioner order or complex needs related to functional status, cognitive ability, or social support system  Outcome: Progressing     Problem: Knowledge Deficit  Goal: Patient/family/caregiver demonstrates understanding of disease process, treatment plan, medications, and discharge instructions  Description: Complete learning assessment and assess knowledge base.  Interventions:  - Provide teaching at level of understanding  - Provide teaching via preferred learning methods  Outcome: Progressing     Problem: SKIN/TISSUE INTEGRITY - ADULT  Goal: Skin Integrity remains intact(Skin Breakdown Prevention)  Description: Assess:  -Perform Ignacio assessment every shift  -Clean and moisturize skin every shift  -Inspect skin when repositioning, toileting, and assisting with ADLS  -Assess under medical devices such as Masimo every shift  -Assess extremities for adequate circulation and sensation     Bed Management:  -Have minimal linens on bed & keep smooth, unwrinkled  -Change linens as needed when moist or perspiring  -Avoid sitting or lying in one position for more than 1 hours while in bed  -Keep HOB at 30 degrees     Toileting:  -Offer bedside commode  -Assess for incontinence every shift  -Use incontinent care products after each incontinent episode such as shield wipes     Activity:  -Mobilize patient 3 times a day  -Encourage activity and walks on unit  -Encourage or provide ROM exercises   -Turn and reposition patient every 2 Hours  -Use appropriate equipment to lift or move patient in bed  -Instruct/ Assist with weight shifting every 1 hour when out of bed in chair  -Consider limitation of chair time 1 hour intervals    Skin Care:  -Avoid use of baby powder, tape, friction and  shearing, hot water or constrictive clothing  -Relieve pressure over bony prominences using wedges   -Do not massage red bony areas    Next Steps:  -Teach patient strategies to minimize risks such as skin breakdown   -Consider consults to  interdisciplinary teams such as wound care nurse   Outcome: Progressing  Goal: Incision(s), wounds(s) or drain site(s) healing without S/S of infection  Description: INTERVENTIONS  - Assess and document dressing, incision, wound bed, drain sites and surrounding tissue  - Provide patient and family education  - Perform skin care/dressing changes every shift  Outcome: Progressing     Problem: HEMATOLOGIC - ADULT  Goal: Maintains hematologic stability  Description: INTERVENTIONS  - Assess for signs and symptoms of bleeding or hemorrhage  - Monitor labs  - Administer supportive blood products/factors as ordered and appropriate  Outcome: Progressing     Problem: MUSCULOSKELETAL - ADULT  Goal: Maintain or return mobility to safest level of function  Description: INTERVENTIONS:  - Assess patient's ability to carry out ADLs; assess patient's baseline for ADL function and identify physical deficits which impact ability to perform ADLs (bathing, care of mouth/teeth, toileting, grooming, dressing, etc.)  - Assess/evaluate cause of self-care deficits   - Assess range of motion  - Assess patient's mobility  - Assess patient's need for assistive devices and provide as appropriate  - Encourage maximum independence but intervene and supervise when necessary  - Involve family in performance of ADLs  - Assess for home care needs following discharge   - Consider OT consult to assist with ADL evaluation and planning for discharge  - Provide patient education as appropriate  Outcome: Progressing  Goal: Maintain proper alignment of affected body part  Description: INTERVENTIONS:  - Support, maintain and protect limb and body alignment  - Provide patient/ family with appropriate education  Outcome:  Progressing     Problem: Prexisting or High Potential for Compromised Skin Integrity  Goal: Skin integrity is maintained or improved  Description: INTERVENTIONS:  - Identify patients at risk for skin breakdown  - Assess and monitor skin integrity  - Assess and monitor nutrition and hydration status  - Monitor labs   - Assess for incontinence   - Turn and reposition patient  - Assist with mobility/ambulation  - Relieve pressure over bony prominences  - Avoid friction and shearing  - Provide appropriate hygiene as needed including keeping skin clean and dry  - Evaluate need for skin moisturizer/barrier cream  - Collaborate with interdisciplinary team   - Patient/family teaching  - Consider wound care consult   Outcome: Progressing

## 2025-01-07 PROBLEM — N39.0 URINARY TRACT INFECTION: Status: ACTIVE | Noted: 2025-01-07

## 2025-01-07 LAB
ANION GAP SERPL CALCULATED.3IONS-SCNC: 8 MMOL/L (ref 4–13)
BUN SERPL-MCNC: 7 MG/DL (ref 5–25)
CALCIUM SERPL-MCNC: 8.2 MG/DL (ref 8.4–10.2)
CHLORIDE SERPL-SCNC: 103 MMOL/L (ref 96–108)
CO2 SERPL-SCNC: 31 MMOL/L (ref 21–32)
CREAT SERPL-MCNC: 0.61 MG/DL (ref 0.6–1.3)
ERYTHROCYTE [DISTWIDTH] IN BLOOD BY AUTOMATED COUNT: 14.7 % (ref 11.6–15.1)
GFR SERPL CREATININE-BSD FRML MDRD: 98 ML/MIN/1.73SQ M
GLUCOSE SERPL-MCNC: 84 MG/DL (ref 65–140)
HCT VFR BLD AUTO: 30.6 % (ref 34.8–46.1)
HGB BLD-MCNC: 9.4 G/DL (ref 11.5–15.4)
MCH RBC QN AUTO: 27.2 PG (ref 26.8–34.3)
MCHC RBC AUTO-ENTMCNC: 30.7 G/DL (ref 31.4–37.4)
MCV RBC AUTO: 89 FL (ref 82–98)
PLATELET # BLD AUTO: 483 THOUSANDS/UL (ref 149–390)
PMV BLD AUTO: 9.7 FL (ref 8.9–12.7)
POTASSIUM SERPL-SCNC: 3.3 MMOL/L (ref 3.5–5.3)
RBC # BLD AUTO: 3.45 MILLION/UL (ref 3.81–5.12)
SODIUM SERPL-SCNC: 142 MMOL/L (ref 135–147)
WBC # BLD AUTO: 4.72 THOUSAND/UL (ref 4.31–10.16)

## 2025-01-07 PROCEDURE — 94760 N-INVAS EAR/PLS OXIMETRY 1: CPT

## 2025-01-07 PROCEDURE — 94640 AIRWAY INHALATION TREATMENT: CPT

## 2025-01-07 PROCEDURE — 99024 POSTOP FOLLOW-UP VISIT: CPT | Performed by: ORTHOPAEDIC SURGERY

## 2025-01-07 PROCEDURE — 99232 SBSQ HOSP IP/OBS MODERATE 35: CPT

## 2025-01-07 PROCEDURE — 80048 BASIC METABOLIC PNL TOTAL CA: CPT

## 2025-01-07 PROCEDURE — 85027 COMPLETE CBC AUTOMATED: CPT

## 2025-01-07 RX ORDER — OXYCODONE HYDROCHLORIDE 5 MG/1
5 TABLET ORAL EVERY 6 HOURS PRN
Refills: 0 | Status: DISCONTINUED | OUTPATIENT
Start: 2025-01-07 | End: 2025-01-09

## 2025-01-07 RX ORDER — AMOXICILLIN 250 MG
2 CAPSULE ORAL 2 TIMES DAILY
Status: DISCONTINUED | OUTPATIENT
Start: 2025-01-07 | End: 2025-01-08

## 2025-01-07 RX ORDER — POTASSIUM CHLORIDE 1500 MG/1
20 TABLET, EXTENDED RELEASE ORAL ONCE
Status: COMPLETED | OUTPATIENT
Start: 2025-01-07 | End: 2025-01-07

## 2025-01-07 RX ORDER — PREDNISONE 20 MG/1
40 TABLET ORAL DAILY
Status: COMPLETED | OUTPATIENT
Start: 2025-01-08 | End: 2025-01-09

## 2025-01-07 RX ORDER — ALBUTEROL SULFATE 90 UG/1
2 INHALANT RESPIRATORY (INHALATION) EVERY 4 HOURS PRN
Status: DISCONTINUED | OUTPATIENT
Start: 2025-01-07 | End: 2025-01-09 | Stop reason: HOSPADM

## 2025-01-07 RX ORDER — OXYCODONE HYDROCHLORIDE 10 MG/1
10 TABLET ORAL EVERY 6 HOURS PRN
Refills: 0 | Status: DISCONTINUED | OUTPATIENT
Start: 2025-01-07 | End: 2025-01-09

## 2025-01-07 RX ADMIN — PRAZOSIN HYDROCHLORIDE 2 MG: 2 CAPSULE ORAL at 21:36

## 2025-01-07 RX ADMIN — BUSPIRONE HYDROCHLORIDE 15 MG: 10 TABLET ORAL at 21:36

## 2025-01-07 RX ADMIN — BUSPIRONE HYDROCHLORIDE 15 MG: 10 TABLET ORAL at 09:27

## 2025-01-07 RX ADMIN — CEFAZOLIN SODIUM 2000 MG: 2 SOLUTION INTRAVENOUS at 21:36

## 2025-01-07 RX ADMIN — ONDANSETRON 4 MG: 2 INJECTION INTRAMUSCULAR; INTRAVENOUS at 07:40

## 2025-01-07 RX ADMIN — SERTRALINE HYDROCHLORIDE 200 MG: 100 TABLET ORAL at 21:36

## 2025-01-07 RX ADMIN — ACETAMINOPHEN 975 MG: 325 TABLET, FILM COATED ORAL at 05:30

## 2025-01-07 RX ADMIN — PREGABALIN 150 MG: 75 CAPSULE ORAL at 21:36

## 2025-01-07 RX ADMIN — PREGABALIN 150 MG: 75 CAPSULE ORAL at 16:37

## 2025-01-07 RX ADMIN — GUAIFENESIN AND DEXTROMETHORPHAN 10 ML: 100; 10 SYRUP ORAL at 09:27

## 2025-01-07 RX ADMIN — HYDROXYZINE HYDROCHLORIDE 25 MG: 25 TABLET ORAL at 09:27

## 2025-01-07 RX ADMIN — OXYCODONE HYDROCHLORIDE 10 MG: 10 TABLET ORAL at 13:16

## 2025-01-07 RX ADMIN — METHYLPREDNISOLONE SODIUM SUCCINATE 40 MG: 40 INJECTION, POWDER, FOR SOLUTION INTRAMUSCULAR; INTRAVENOUS at 09:31

## 2025-01-07 RX ADMIN — LIDOCAINE 1 PATCH: 50 PATCH TOPICAL at 21:37

## 2025-01-07 RX ADMIN — CEFAZOLIN SODIUM 2000 MG: 2 SOLUTION INTRAVENOUS at 05:29

## 2025-01-07 RX ADMIN — TRAZODONE HYDROCHLORIDE 300 MG: 100 TABLET ORAL at 01:05

## 2025-01-07 RX ADMIN — POLYETHYLENE GLYCOL 3350 17 G: 17 POWDER, FOR SOLUTION ORAL at 09:27

## 2025-01-07 RX ADMIN — SENNOSIDES AND DOCUSATE SODIUM 2 TABLET: 50; 8.6 TABLET ORAL at 21:36

## 2025-01-07 RX ADMIN — ARIPIPRAZOLE 10 MG: 10 TABLET ORAL at 09:27

## 2025-01-07 RX ADMIN — IPRATROPIUM BROMIDE AND ALBUTEROL SULFATE 3 ML: .5; 3 SOLUTION RESPIRATORY (INHALATION) at 16:58

## 2025-01-07 RX ADMIN — POTASSIUM CHLORIDE 20 MEQ: 1500 TABLET, EXTENDED RELEASE ORAL at 12:27

## 2025-01-07 RX ADMIN — HYDROXYZINE HYDROCHLORIDE 25 MG: 25 TABLET ORAL at 21:36

## 2025-01-07 RX ADMIN — GUAIFENESIN AND DEXTROMETHORPHAN 10 ML: 100; 10 SYRUP ORAL at 16:37

## 2025-01-07 RX ADMIN — BUSPIRONE HYDROCHLORIDE 15 MG: 10 TABLET ORAL at 16:37

## 2025-01-07 RX ADMIN — LUBIPROSTONE 8 MCG: 8 CAPSULE, GELATIN COATED ORAL at 21:36

## 2025-01-07 RX ADMIN — CEFAZOLIN SODIUM 2000 MG: 2 SOLUTION INTRAVENOUS at 13:21

## 2025-01-07 RX ADMIN — ASPIRIN 81 MG: 81 TABLET, COATED ORAL at 09:27

## 2025-01-07 RX ADMIN — ACETAMINOPHEN 975 MG: 325 TABLET, FILM COATED ORAL at 21:36

## 2025-01-07 RX ADMIN — OXYCODONE HYDROCHLORIDE 10 MG: 10 TABLET ORAL at 05:38

## 2025-01-07 RX ADMIN — PREGABALIN 150 MG: 75 CAPSULE ORAL at 09:27

## 2025-01-07 RX ADMIN — LUBIPROSTONE 8 MCG: 8 CAPSULE, GELATIN COATED ORAL at 09:28

## 2025-01-07 RX ADMIN — ACETAMINOPHEN 975 MG: 325 TABLET, FILM COATED ORAL at 13:16

## 2025-01-07 RX ADMIN — ATORVASTATIN CALCIUM 40 MG: 40 TABLET, FILM COATED ORAL at 16:37

## 2025-01-07 RX ADMIN — ENOXAPARIN SODIUM 40 MG: 40 INJECTION SUBCUTANEOUS at 09:27

## 2025-01-07 RX ADMIN — HYDROXYZINE HYDROCHLORIDE 25 MG: 25 TABLET ORAL at 16:37

## 2025-01-07 RX ADMIN — GUAIFENESIN AND DEXTROMETHORPHAN 10 ML: 100; 10 SYRUP ORAL at 20:16

## 2025-01-07 RX ADMIN — AMLODIPINE BESYLATE 5 MG: 5 TABLET ORAL at 09:27

## 2025-01-07 RX ADMIN — PANTOPRAZOLE SODIUM 40 MG: 40 TABLET, DELAYED RELEASE ORAL at 05:30

## 2025-01-07 RX ADMIN — SENNOSIDES AND DOCUSATE SODIUM 1 TABLET: 50; 8.6 TABLET ORAL at 09:27

## 2025-01-07 RX ADMIN — IPRATROPIUM BROMIDE AND ALBUTEROL SULFATE 3 ML: .5; 3 SOLUTION RESPIRATORY (INHALATION) at 08:22

## 2025-01-07 NOTE — ASSESSMENT & PLAN NOTE
Urine cultures obtained on admission growing >100,000 CFU of E.coli  Patient denies any dysuria or urgency but does admit to increased urinary frequency  Received 1 dose of ceftriaxone and 4 days of IV cefazolin  Patient has received adequate IV antibiotic treatment for UTI after tomorrow

## 2025-01-07 NOTE — PROGRESS NOTES
Progress Note - Orthopedics   Name: Samantha Rodriguez 59 y.o. female I MRN: [id number]  Unit/Bed#: [place] [address] -01 I Date of Admission: 1/2/2025   Date of Service: 1/7/2025 I Hospital Day: 4     Assessment & Plan  Ambulatory dysfunction  2 weeks s/p removal hardware L2-S1, posterior fusion L5-S1, revision instrumentation L2-pelvis, bilateral S2 alar screws with Dr. Bills and Dr. Stevens on 12/19.  Overall improvement.  CRP improving from 231.1 > 224.7 > 198.9.   Leukocytosis resolved and patient has been afebrile   Also being treated for RSV bronchitis per SLIM  Currently on ancef q8h  Maintain Prevena iVAC over incision site and sacral wound on back. Re-enforce as needed as patient becomes more mobile.  WBAT bilateral upper and lower extremities  LSO brace when OOB  PT/OT, recommending level 2 rehab  Pain control, DVT ppx per primary  Stable for d/c from orthopedic standpoint. Follow up as outpt with Dr. Bills.   Lumbar radiculopathy    SIRS (systemic inflammatory response syndrome) (HCC)    Wound of sacral region    RSV bronchitis      Ok for discharge from Orthopedics service perspective.  Orthopedics service will follow.    Subjective   59 y.o.female seen and evaluated. Notes overall improvement with her back pain. No fever/chills. No LE numbness/tingling.    Objective :  Temp:  [98.2 °F (36.8 °C)-98.9 °F (37.2 °C)] 98.2 °F (36.8 °C)  HR:  [56-68] 67  BP: (119-129)/(57-78) 125/57  Resp:  [18-20] 20  SpO2:  [97 %-100 %] 100 %  O2 Device: None (Room air)    Physical Exam  Musculoskeletal: Lumbar spine:  VAC in place with adequate seal after re-enforcement with Tegaderm distally near gluteal cleft. No drainage appreciated in canister.  No surrounding TTP or palpable collection/fluctuance.  Left motor: Hip flexion 3/5, knee extension 3/5, knee flexion 4/5, dorsiflexion 3/5, plantar flexion 4/5, EHL 3/5  Right motor: Hip flexion 4/5, knee extension 4/5, knee flexion 4/5, dorsiflexion 3/5, plantarflexion 4/5,  EHL 3/5  Sensation intact L2-S1 b/l LEs  2+ DP pulses bilaterally      Lab Results: I have reviewed the following results:  Recent Labs     01/05/25  0500 01/06/25  0511 01/07/25  0437   WBC 5.54 5.63 4.72   HGB 9.3* 9.2* 9.4*   HCT 29.9* 29.9* 30.6*   * 460* 483*   BUN 12 8 7   CREATININE 0.69 0.59* 0.61   .9*  --   --

## 2025-01-07 NOTE — PROGRESS NOTES
Progress Note - Hospitalist   Name: Samantha Rodriguez 59 y.o. female I MRN: [id number]  Unit/Bed#: [place] [address] -01 I Date of Admission: 1/2/2025   Date of Service: 1/7/2025 I Hospital Day: 4    Assessment & Plan  Ambulatory dysfunction  Patient reports after getting back from rehab she has been struggling to walk secondary to her back pain  She typically ambulates with a walker  Continue PT/OT - recommending rehab. CM following for safe discharge planning  Lumbar radiculopathy  Status post removal L2-S1 hardware after failure of therapy, with new L5-S1 fusion performed by Dr. Stevens on 12/19 without noted complication  Patient continues to have pain limiting her ambulation although notes that her pain is improving  Without red flag signs or symptoms  Previous provider reviewed CT scan with the radiologist which did not show any central canal narrowing  Orthopedics following and managing Prevena iVAC over incision site  Recommending LSO brace with OOB  Pain control with scheduled Tylenol, oxycodone as needed, Lyrica. Patient was not getting any relief with home morphine  Planning for rehab on discharge  Wound of sacral region  Reviewed previous provider notes, wound care and orthopedics.  Orthopedist does not feel this represents dehiscence but more likely sacral wound and a wound VAC is in place  Patient is on antibiotics for cystitis as stated below which is providing empiric coverage  SIRS (systemic inflammatory response syndrome) (HCC)  SIRS noted with fever of 101.2 on 1/5/2025  No further fever since.  Remains without leukocytosis  Blood cultures remain negative to date  Currently on IV antibiotics for UTI, receiving treatment for RSV  Management of wound as stated above  RSV bronchitis  With coughing and wheezing, tested positive for RSV on 1/3/2025  Patient was started on IV steroids with scheduled nebulizers  Status post 3 days IV steroids, wheezing has improved.  She remains on room air  Chest x-ray  without acute findings  Transition over to oral prednisone tomorrow  Continue antitussives and supportive care  Nebulizer treatment as needed  Urinary tract infection  Urine cultures obtained on admission growing >100,000 CFU of E.coli  Patient denies any dysuria or urgency but does admit to increased urinary frequency  Received 1 dose of ceftriaxone and 4 days of IV cefazolin  Patient has received adequate IV antibiotic treatment for UTI after tomorrow  Essential hypertension  Blood pressure is well-controlled.  Continue home amlodipine 5 mg with hold parameters  Bipolar disorder (HCC)  Patient was seen at [facility] in November 202, reviewed hospital stay  With history of severe major depressive disorder and generalized anxiety disorder with panic attacks  Continue PTA medication with Abilify, Zoloft, BuSpar, Minipress and trazodone nightly as needed  Patient is also on Ingrezza which is nonformulary here for tar dive dyskinesia  Slow transit constipation  Continue bowel regimen, last BM noted yesterday    VTE Pharmacologic Prophylaxis: VTE Score: 3 Moderate Risk (Score 3-4) - Pharmacological DVT Prophylaxis Ordered: enoxaparin (Lovenox).    Mobility:   Basic Mobility Inpatient Raw Score: 18  JH-HLM Goal: 6: Walk 10 steps or more  JH-HLM Achieved: 6: Walk 10 steps or more  JH-HLM Goal achieved. Continue to encourage appropriate mobility.    Patient Centered Rounds: I performed bedside rounds with nursing staff today.   Discussions with Specialists or Other Care Team Provider: Orthopedics, CM, Infectious Disease    Education and Discussions with Family / Patient: Updated  (daughter) via phone.    Current Length of Stay: 4 day(s)  Current Patient Status: Inpatient   Certification Statement: The patient will continue to require additional inpatient hospital stay due to antibiotics, discharge planning  Discharge Plan: TBD    Code Status: Level 1 - Full Code    Subjective   Patient seen and examined.  Her  breathing still feels tight and she is still coughing.  She denies any fevers.  She still having some back pain but notes it is improving since her surgery.  She denies any numbness tingling or lack of sensation in her groin or lower legs.  She has been urinating fine without any burning or urgency.  She ate this morning.  She did have some nausea but it subsided.  She denies any vomiting    Patient denies any smoking.  Denies asthma or COPD history    Reviewed media pictures with infectious disease.     Objective :  Temp:  [98.2 °F (36.8 °C)-98.9 °F (37.2 °C)] 98.2 °F (36.8 °C)  HR:  [56-68] 67  BP: (119-129)/(57-78) 125/57  Resp:  [18-20] 20  SpO2:  [97 %-100 %] 100 %  O2 Device: None (Room air)    Body mass index is 38.24 kg/m².     Input and Output Summary (last 24 hours):     Intake/Output Summary (Last 24 hours) at 1/7/2025 1204  Last data filed at 1/7/2025 0810  Gross per 24 hour   Intake 240 ml   Output 2450 ml   Net -2210 ml       Physical Exam  Constitutional:       Appearance: She is not ill-appearing or diaphoretic.   Cardiovascular:      Rate and Rhythm: Normal rate and regular rhythm.   Pulmonary:      Effort: Pulmonary effort is normal. No respiratory distress.      Comments: Rhonchorous breath sounds bilateral upper apices, on room air.  Minimal expiratory wheezing bilateral upper bases  Abdominal:      General: Bowel sounds are normal.      Palpations: Abdomen is soft.      Tenderness: There is no abdominal tenderness.   Musculoskeletal:      Right lower leg: No edema.      Left lower leg: No edema.      Comments: Back with cover in place, clean dry intact.  Wound VAC attached   Neurological:      Mental Status: She is alert. Mental status is at baseline.      Motor: Weakness (generalized) present.      Comments: Tardive dyskinesia noted   Psychiatric:         Speech: Speech normal.         Behavior: Behavior is cooperative.         Lab Results: I have reviewed the following results:   Results from  last 7 days   Lab Units 01/07/25  0437 01/04/25  0539 01/03/25  0315 01/02/25  2200   WBC Thousand/uL 4.72   < > 9.50 11.19*   HEMOGLOBIN g/dL 9.4*   < > 9.3* 10.5*   HEMATOCRIT % 30.6*   < > 28.7* 32.7*   PLATELETS Thousands/uL 483*   < > 405* 434*   BANDS PCT %  --   --   --  1   SEGS PCT %  --   --  81*  --    LYMPHO PCT %  --   --  9* 2*   MONO PCT %  --   --  8 7   EOS PCT %  --   --  1 0    < > = values in this interval not displayed.     Results from last 7 days   Lab Units 01/07/25  0437   SODIUM mmol/L 142   POTASSIUM mmol/L 3.3*   CHLORIDE mmol/L 103   CO2 mmol/L 31   BUN mg/dL 7   CREATININE mg/dL 0.61   ANION GAP mmol/L 8   CALCIUM mg/dL 8.2*   GLUCOSE RANDOM mg/dL 84     Results from last 7 days   Lab Units 01/06/25  0720 01/05/25  1611 01/04/25  2110   POC GLUCOSE mg/dl 102 163* 119     Results from last 7 days   Lab Units 01/03/25  0315   LACTIC ACID mmol/L 0.8   PROCALCITONIN ng/ml 0.36*     Recent Cultures (last 7 days):   Results from last 7 days   Lab Units 01/03/25  0626 01/03/25  0316   BLOOD CULTURE   --  No Growth After 4 Days.  No Growth After 4 Days.   URINE CULTURE  >100,000 cfu/ml Escherichia coli*  50,000-59,000 cfu/ml  --      Last 24 Hours Medication List:     Current Facility-Administered Medications:     acetaminophen (TYLENOL) tablet 975 mg, Q8H KESHIA    albuterol (PROVENTIL HFA,VENTOLIN HFA) inhaler 2 puff, Q4H PRN    aluminum-magnesium hydroxide-simethicone (MAALOX) oral suspension 30 mL, Q4H PRN    amLODIPine (NORVASC) tablet 5 mg, Daily    ARIPiprazole (ABILIFY) tablet 10 mg, Daily    aspirin (ECOTRIN LOW STRENGTH) EC tablet 81 mg, Daily    atorvastatin (LIPITOR) tablet 40 mg, Daily With Dinner    bisacodyl (DULCOLAX) rectal suppository 10 mg, Daily PRN    busPIRone (BUSPAR) tablet 15 mg, TID    ceFAZolin (ANCEF) IVPB (premix in dextrose) 2,000 mg 50 mL, Q8H, Last Rate: 2,000 mg (01/07/25 5713)    dextromethorphan-guaiFENesin (ROBITUSSIN DM) oral syrup 10 mL, TID    enoxaparin  (LOVENOX) subcutaneous injection 40 mg, Daily    HYDROmorphone (DILAUDID) injection 0.5 mg, Q4H PRN    hydrOXYzine HCL (ATARAX) tablet 25 mg, TID    ipratropium-albuterol (DUO-NEB) 0.5-2.5 mg/3 mL inhalation solution 3 mL, Q6H PRN    lidocaine (LIDODERM) 5 % patch 1 patch, Daily    lubiprostone (AMITIZA) capsule 8 mcg, BID    methylPREDNISolone sodium succinate (Solu-MEDROL) injection 40 mg, Daily    ondansetron (ZOFRAN) injection 4 mg, Q6H PRN    oxyCODONE (ROXICODONE) IR tablet 5 mg, Q4H PRN **OR** oxyCODONE (ROXICODONE) immediate release tablet 10 mg, Q4H PRN    pantoprazole (PROTONIX) EC tablet 40 mg, Early Morning    pneumococcal 20-cheryl conj vacc (PREVNAR 20) IM Injection 0.5 mL, Once PRN    polyethylene glycol (MIRALAX) packet 17 g, Daily    prazosin (MINIPRESS) capsule 2 mg, HS    pregabalin (LYRICA) capsule 150 mg, TID    senna-docusate sodium (SENOKOT S) 8.6-50 mg per tablet 1 tablet, BID    sertraline (ZOLOFT) tablet 200 mg, HS    tiZANidine (ZANAFLEX) tablet 4 mg, Q8H PRN    traZODone (DESYREL) tablet 300 mg, HS PRN    Valbenazine Tosylate CAPS 1 capsule, Daily    Administrative Statements   Today, Patient Was Seen By: Priscilla Kan PA-C      **Please Note: This note may have been constructed using a voice recognition system.**

## 2025-01-07 NOTE — ASSESSMENT & PLAN NOTE
Patient was seen at [facility] in November 202, reviewed hospital stay  With history of severe major depressive disorder and generalized anxiety disorder with panic attacks  Continue PTA medication with Abilify, Zoloft, BuSpar, Minipress and trazodone nightly as needed  Patient is also on Ingrezza which is nonformulary here for tar dive dyskinesia

## 2025-01-07 NOTE — PLAN OF CARE
Problem: PAIN - ADULT  Goal: Verbalizes/displays adequate comfort level or baseline comfort level  Description: Interventions:  - Encourage patient to monitor pain and request assistance  - Assess pain using appropriate pain scale  - Administer analgesics based on type and severity of pain and evaluate response  - Implement non-pharmacological measures as appropriate and evaluate response  - Consider cultural and social influences on pain and pain management  - Notify physician/advanced practitioner if interventions unsuccessful or patient reports new pain  1/7/2025 1217 by Laury Coello RN  Outcome: Progressing  1/7/2025 1217 by Laury Coello RN  Outcome: Progressing

## 2025-01-07 NOTE — ASSESSMENT & PLAN NOTE
SIRS noted with fever of 101.2 on 1/5/2025  No further fever since.  Remains without leukocytosis  Blood cultures remain negative to date  Currently on IV antibiotics for UTI, receiving treatment for RSV  Management of wound as stated above

## 2025-01-07 NOTE — ASSESSMENT & PLAN NOTE
Patient reports after getting back from rehab she has been struggling to walk secondary to her back pain  She typically ambulates with a walker  Continue PT/OT - recommending rehab. CM following for safe discharge planning

## 2025-01-07 NOTE — ASSESSMENT & PLAN NOTE
Reviewed previous provider notes, wound care and orthopedics.  Orthopedist does not feel this represents dehiscence but more likely sacral wound and a wound VAC is in place  Patient is on antibiotics for cystitis as stated below which is providing empiric coverage

## 2025-01-07 NOTE — ASSESSMENT & PLAN NOTE
With coughing and wheezing, tested positive for RSV on 1/3/2025  Patient was started on IV steroids with scheduled nebulizers  Status post 3 days IV steroids, wheezing has improved.  She remains on room air  Chest x-ray without acute findings  Transition over to oral prednisone tomorrow  Continue antitussives and supportive care  Nebulizer treatment as needed

## 2025-01-07 NOTE — PLAN OF CARE
Problem: PAIN - ADULT  Goal: Verbalizes/displays adequate comfort level or baseline comfort level  Description: Interventions:  - Encourage patient to monitor pain and request assistance  - Assess pain using appropriate pain scale  - Administer analgesics based on type and severity of pain and evaluate response  - Implement non-pharmacological measures as appropriate and evaluate response  - Consider cultural and social influences on pain and pain management  - Notify physician/advanced practitioner if interventions unsuccessful or patient reports new pain  Outcome: Progressing     Problem: INFECTION - ADULT  Goal: Absence or prevention of progression during hospitalization  Description: INTERVENTIONS:  - Assess and monitor for signs and symptoms of infection  - Monitor lab/diagnostic results  - Monitor all insertion sites, i.e. indwelling lines, tubes, and drains  - Monitor endotracheal if appropriate and nasal secretions for changes in amount and color  - Harrison appropriate cooling/warming therapies per order  - Administer medications as ordered  - Instruct and encourage patient and family to use good hand hygiene technique  - Identify and instruct in appropriate isolation precautions for identified infection/condition  Outcome: Progressing     Problem: SAFETY ADULT  Goal: Patient will remain free of falls  Description: INTERVENTIONS:  - Educate patient/family on patient safety including physical limitations  - Instruct patient to call for assistance with activity   - Consult OT/PT to assist with strengthening/mobility   - Keep Call bell within reach  - Keep bed low and locked with side rails adjusted as appropriate  - Keep care items and personal belongings within reach  - Initiate and maintain comfort rounds  - Make Fall Risk Sign visible to staff  - Offer Toileting every 2 Hours, in advance of need  - Initiate/Maintain bed alarm  - Obtain necessary fall risk management equipment: bed alarms   - Apply  yellow socks and bracelet for high fall risk patients  - Consider moving patient to room near nurses station  Outcome: Progressing  Goal: Maintain or return to baseline ADL function  Description: INTERVENTIONS:  -  Assess patient's ability to carry out ADLs; assess patient's baseline for ADL function and identify physical deficits which impact ability to perform ADLs (bathing, care of mouth/teeth, toileting, grooming, dressing, etc.)  - Assess/evaluate cause of self-care deficits   - Assess range of motion  - Assess patient's mobility; develop plan if impaired  - Assess patient's need for assistive devices and provide as appropriate  - Encourage maximum independence but intervene and supervise when necessary  - Involve family in performance of ADLs  - Assess for home care needs following discharge   - Consider OT consult to assist with ADL evaluation and planning for discharge  - Provide patient education as appropriate  Outcome: Progressing  Goal: Maintains/Returns to pre admission functional level  Description: INTERVENTIONS:  - Perform AM-PAC 6 Click Basic Mobility/ Daily Activity assessment daily.  - Set and communicate daily mobility goal to care team and patient/family/caregiver.   - Collaborate with rehabilitation services on mobility goals if consulted  - Perform Range of Motion 4 times a day.  - Reposition patient every 2 hours.  - Dangle patient 3 times a day  - Stand patient 3 times a day  - Ambulate patient 3 times a day  - Out of bed to chair 3 times a day   - Out of bed for meals 3 times a day  - Out of bed for toileting  - Record patient progress and toleration of activity level   Outcome: Progressing     Problem: DISCHARGE PLANNING  Goal: Discharge to home or other facility with appropriate resources  Description: INTERVENTIONS:  - Identify barriers to discharge w/patient and caregiver  - Arrange for needed discharge resources and transportation as appropriate  - Identify discharge learning needs  (meds, wound care, etc.)  - Arrange for interpretive services to assist at discharge as needed  - Refer to Case Management Department for coordinating discharge planning if the patient needs post-hospital services based on physician/advanced practitioner order or complex needs related to functional status, cognitive ability, or social support system  Outcome: Progressing     Problem: Knowledge Deficit  Goal: Patient/family/caregiver demonstrates understanding of disease process, treatment plan, medications, and discharge instructions  Description: Complete learning assessment and assess knowledge base.  Interventions:  - Provide teaching at level of understanding  - Provide teaching via preferred learning methods  Outcome: Progressing     Problem: SKIN/TISSUE INTEGRITY - ADULT  Goal: Skin Integrity remains intact(Skin Breakdown Prevention)  Description: Assess:  -Perform Ignacio assessment every shift  -Clean and moisturize skin every shift  -Inspect skin when repositioning, toileting, and assisting with ADLS  -Assess under medical devices such as Masimo every shift  -Assess extremities for adequate circulation and sensation     Bed Management:  -Have minimal linens on bed & keep smooth, unwrinkled  -Change linens as needed when moist or perspiring  -Avoid sitting or lying in one position for more than 1 hours while in bed  -Keep HOB at 30 degrees     Toileting:  -Offer bedside commode  -Assess for incontinence every shift  -Use incontinent care products after each incontinent episode such as shield wipes     Activity:  -Mobilize patient 3 times a day  -Encourage activity and walks on unit  -Encourage or provide ROM exercises   -Turn and reposition patient every 2 Hours  -Use appropriate equipment to lift or move patient in bed  -Instruct/ Assist with weight shifting every 1 hour when out of bed in chair  -Consider limitation of chair time 1 hour intervals    Skin Care:  -Avoid use of baby powder, tape, friction and  shearing, hot water or constrictive clothing  -Relieve pressure over bony prominences using wedges   -Do not massage red bony areas    Next Steps:  -Teach patient strategies to minimize risks such as skin breakdown   -Consider consults to  interdisciplinary teams such as wound care nurse   Outcome: Progressing  Goal: Incision(s), wounds(s) or drain site(s) healing without S/S of infection  Description: INTERVENTIONS  - Assess and document dressing, incision, wound bed, drain sites and surrounding tissue  - Provide patient and family education  - Perform skin care/dressing changes every shift  Outcome: Progressing     Problem: HEMATOLOGIC - ADULT  Goal: Maintains hematologic stability  Description: INTERVENTIONS  - Assess for signs and symptoms of bleeding or hemorrhage  - Monitor labs  - Administer supportive blood products/factors as ordered and appropriate  Outcome: Progressing     Problem: MUSCULOSKELETAL - ADULT  Goal: Maintain or return mobility to safest level of function  Description: INTERVENTIONS:  - Assess patient's ability to carry out ADLs; assess patient's baseline for ADL function and identify physical deficits which impact ability to perform ADLs (bathing, care of mouth/teeth, toileting, grooming, dressing, etc.)  - Assess/evaluate cause of self-care deficits   - Assess range of motion  - Assess patient's mobility  - Assess patient's need for assistive devices and provide as appropriate  - Encourage maximum independence but intervene and supervise when necessary  - Involve family in performance of ADLs  - Assess for home care needs following discharge   - Consider OT consult to assist with ADL evaluation and planning for discharge  - Provide patient education as appropriate  Outcome: Progressing  Goal: Maintain proper alignment of affected body part  Description: INTERVENTIONS:  - Support, maintain and protect limb and body alignment  - Provide patient/ family with appropriate education  Outcome:  Progressing     Problem: Prexisting or High Potential for Compromised Skin Integrity  Goal: Skin integrity is maintained or improved  Description: INTERVENTIONS:  - Identify patients at risk for skin breakdown  - Assess and monitor skin integrity  - Assess and monitor nutrition and hydration status  - Monitor labs   - Assess for incontinence   - Turn and reposition patient  - Assist with mobility/ambulation  - Relieve pressure over bony prominences  - Avoid friction and shearing  - Provide appropriate hygiene as needed including keeping skin clean and dry  - Evaluate need for skin moisturizer/barrier cream  - Collaborate with interdisciplinary team   - Patient/family teaching  - Consider wound care consult   Outcome: Progressing

## 2025-01-07 NOTE — ASSESSMENT & PLAN NOTE
2 weeks s/p removal hardware L2-S1, posterior fusion L5-S1, revision instrumentation L2-pelvis, bilateral S2 alar screws with Dr. Bills and Dr. Stevens on 12/19.  Overall improvement.  CRP improving from 231.1 > 224.7 > 198.9.   Leukocytosis resolved and patient has been afebrile   Also being treated for RSV bronchitis per SLIM  Currently on ancef q8h  Maintain Prevena iVAC over incision site and sacral wound on back. Re-enforce as needed as patient becomes more mobile.  WBAT bilateral upper and lower extremities  LSO brace when OOB  PT/OT, recommending level 2 rehab  Pain control, DVT ppx per primary  Stable for d/c from orthopedic standpoint. Follow up as outpt with Dr. Bills.

## 2025-01-07 NOTE — ASSESSMENT & PLAN NOTE
Status post removal L2-S1 hardware after failure of therapy, with new L5-S1 fusion performed by Dr. Stevens on 12/19 without noted complication  Patient continues to have pain limiting her ambulation although notes that her pain is improving  Without red flag signs or symptoms  Previous provider reviewed CT scan with the radiologist which did not show any central canal narrowing  Orthopedics following and managing Prevena iVAC over incision site  Recommending LSO brace with OOB  Pain control with scheduled Tylenol, oxycodone as needed, Lyrica. Patient was not getting any relief with home morphine  Planning for rehab on discharge

## 2025-01-07 NOTE — QUICK NOTE
Patient was seen and evaluated at bedside with the attending physician.  The vac was taken down to examine the wound.  Small area of dehiscence at the distal aspect of the wound.  No current active drainage.  No purulence.  Dry dressings were placed in the form of gauze pads and Tegaderm.  Ortho will check daily and plan to change the dressings every 3-4 days.    Rest of plan per official progress note from this morning.      Shari Costello PA-C

## 2025-01-07 NOTE — TELEPHONE ENCOUNTER
Ingrezza has been denied. Denial letter is scanned under media tab. States that pt is eligible for Medicare. Will need to follow up on alternate insurance for pt.

## 2025-01-07 NOTE — TELEPHONE ENCOUNTER
Pt is currently inpatient at this time at [facility]. Will need to follow up regarding if the PA is needed when discharged.

## 2025-01-08 PROBLEM — T81.30XA WOUND DEHISCENCE: Status: ACTIVE | Noted: 2025-01-08

## 2025-01-08 LAB
ANION GAP SERPL CALCULATED.3IONS-SCNC: 8 MMOL/L (ref 4–13)
BACTERIA BLD CULT: NORMAL
BACTERIA BLD CULT: NORMAL
BUN SERPL-MCNC: 8 MG/DL (ref 5–25)
CALCIUM SERPL-MCNC: 8.2 MG/DL (ref 8.4–10.2)
CHLORIDE SERPL-SCNC: 104 MMOL/L (ref 96–108)
CO2 SERPL-SCNC: 30 MMOL/L (ref 21–32)
CREAT SERPL-MCNC: 0.66 MG/DL (ref 0.6–1.3)
ERYTHROCYTE [DISTWIDTH] IN BLOOD BY AUTOMATED COUNT: 14.9 % (ref 11.6–15.1)
FERRITIN SERPL-MCNC: 500 NG/ML (ref 11–307)
GFR SERPL CREATININE-BSD FRML MDRD: 95 ML/MIN/1.73SQ M
GLUCOSE SERPL-MCNC: 91 MG/DL (ref 65–140)
HCT VFR BLD AUTO: 29.5 % (ref 34.8–46.1)
HGB BLD-MCNC: 9.2 G/DL (ref 11.5–15.4)
IRON SATN MFR SERPL: 17 % (ref 15–50)
IRON SERPL-MCNC: 36 UG/DL (ref 50–212)
MCH RBC QN AUTO: 27.4 PG (ref 26.8–34.3)
MCHC RBC AUTO-ENTMCNC: 31.2 G/DL (ref 31.4–37.4)
MCV RBC AUTO: 88 FL (ref 82–98)
PLATELET # BLD AUTO: 476 THOUSANDS/UL (ref 149–390)
PMV BLD AUTO: 9.4 FL (ref 8.9–12.7)
POTASSIUM SERPL-SCNC: 3.4 MMOL/L (ref 3.5–5.3)
RBC # BLD AUTO: 3.36 MILLION/UL (ref 3.81–5.12)
SODIUM SERPL-SCNC: 142 MMOL/L (ref 135–147)
TIBC SERPL-MCNC: 205.8 UG/DL (ref 250–450)
TRANSFERRIN SERPL-MCNC: 147 MG/DL (ref 203–362)
UIBC SERPL-MCNC: 170 UG/DL (ref 155–355)
WBC # BLD AUTO: 4.56 THOUSAND/UL (ref 4.31–10.16)

## 2025-01-08 PROCEDURE — 99232 SBSQ HOSP IP/OBS MODERATE 35: CPT

## 2025-01-08 PROCEDURE — 83550 IRON BINDING TEST: CPT

## 2025-01-08 PROCEDURE — 99024 POSTOP FOLLOW-UP VISIT: CPT | Performed by: ORTHOPAEDIC SURGERY

## 2025-01-08 PROCEDURE — 85027 COMPLETE CBC AUTOMATED: CPT

## 2025-01-08 PROCEDURE — 83540 ASSAY OF IRON: CPT

## 2025-01-08 PROCEDURE — 82728 ASSAY OF FERRITIN: CPT

## 2025-01-08 PROCEDURE — 80048 BASIC METABOLIC PNL TOTAL CA: CPT

## 2025-01-08 RX ORDER — CALCIUM CARBONATE 500 MG/1
1000 TABLET, CHEWABLE ORAL ONCE
Status: COMPLETED | OUTPATIENT
Start: 2025-01-08 | End: 2025-01-08

## 2025-01-08 RX ORDER — POTASSIUM CHLORIDE 1500 MG/1
20 TABLET, EXTENDED RELEASE ORAL ONCE
Status: COMPLETED | OUTPATIENT
Start: 2025-01-08 | End: 2025-01-08

## 2025-01-08 RX ORDER — BENZTROPINE MESYLATE 1 MG/1
1 TABLET ORAL 2 TIMES DAILY
Status: DISCONTINUED | OUTPATIENT
Start: 2025-01-08 | End: 2025-01-09 | Stop reason: HOSPADM

## 2025-01-08 RX ORDER — DICYCLOMINE HCL 20 MG
10 TABLET ORAL 3 TIMES DAILY PRN
Status: DISCONTINUED | OUTPATIENT
Start: 2025-01-08 | End: 2025-01-09

## 2025-01-08 RX ORDER — POLYETHYLENE GLYCOL 3350 17 G/17G
17 POWDER, FOR SOLUTION ORAL DAILY PRN
Status: DISCONTINUED | OUTPATIENT
Start: 2025-01-08 | End: 2025-01-09 | Stop reason: HOSPADM

## 2025-01-08 RX ORDER — PANTOPRAZOLE SODIUM 40 MG/1
40 TABLET, DELAYED RELEASE ORAL
Status: DISCONTINUED | OUTPATIENT
Start: 2025-01-08 | End: 2025-01-09 | Stop reason: HOSPADM

## 2025-01-08 RX ORDER — BENZONATATE 100 MG/1
100 CAPSULE ORAL 3 TIMES DAILY
Status: DISCONTINUED | OUTPATIENT
Start: 2025-01-08 | End: 2025-01-09 | Stop reason: HOSPADM

## 2025-01-08 RX ORDER — AMOXICILLIN 250 MG
1 CAPSULE ORAL 2 TIMES DAILY
Status: DISCONTINUED | OUTPATIENT
Start: 2025-01-09 | End: 2025-01-09 | Stop reason: HOSPADM

## 2025-01-08 RX ADMIN — LUBIPROSTONE 8 MCG: 8 CAPSULE, GELATIN COATED ORAL at 21:17

## 2025-01-08 RX ADMIN — ALUMINUM HYDROXIDE, MAGNESIUM HYDROXIDE, AND DIMETHICONE 30 ML: 200; 20; 200 SUSPENSION ORAL at 13:02

## 2025-01-08 RX ADMIN — PRAZOSIN HYDROCHLORIDE 2 MG: 2 CAPSULE ORAL at 21:18

## 2025-01-08 RX ADMIN — PANTOPRAZOLE SODIUM 40 MG: 40 TABLET, DELAYED RELEASE ORAL at 05:47

## 2025-01-08 RX ADMIN — BENZONATATE 100 MG: 100 CAPSULE ORAL at 21:17

## 2025-01-08 RX ADMIN — PREDNISONE 40 MG: 20 TABLET ORAL at 09:10

## 2025-01-08 RX ADMIN — LUBIPROSTONE 8 MCG: 8 CAPSULE, GELATIN COATED ORAL at 09:11

## 2025-01-08 RX ADMIN — ATORVASTATIN CALCIUM 40 MG: 40 TABLET, FILM COATED ORAL at 16:47

## 2025-01-08 RX ADMIN — HYDROXYZINE HYDROCHLORIDE 25 MG: 25 TABLET ORAL at 09:11

## 2025-01-08 RX ADMIN — GUAIFENESIN AND DEXTROMETHORPHAN 10 ML: 100; 10 SYRUP ORAL at 21:16

## 2025-01-08 RX ADMIN — ACETAMINOPHEN 975 MG: 325 TABLET, FILM COATED ORAL at 14:22

## 2025-01-08 RX ADMIN — ALUMINUM HYDROXIDE, MAGNESIUM HYDROXIDE, AND DIMETHICONE 30 ML: 200; 20; 200 SUSPENSION ORAL at 22:13

## 2025-01-08 RX ADMIN — LIDOCAINE 1 PATCH: 50 PATCH TOPICAL at 21:16

## 2025-01-08 RX ADMIN — CEFAZOLIN SODIUM 2000 MG: 2 SOLUTION INTRAVENOUS at 14:21

## 2025-01-08 RX ADMIN — BUSPIRONE HYDROCHLORIDE 15 MG: 10 TABLET ORAL at 09:09

## 2025-01-08 RX ADMIN — GUAIFENESIN AND DEXTROMETHORPHAN 10 ML: 100; 10 SYRUP ORAL at 09:09

## 2025-01-08 RX ADMIN — ACETAMINOPHEN 975 MG: 325 TABLET, FILM COATED ORAL at 21:16

## 2025-01-08 RX ADMIN — PANTOPRAZOLE SODIUM 40 MG: 40 TABLET, DELAYED RELEASE ORAL at 16:50

## 2025-01-08 RX ADMIN — ENOXAPARIN SODIUM 40 MG: 40 INJECTION SUBCUTANEOUS at 09:11

## 2025-01-08 RX ADMIN — ARIPIPRAZOLE 10 MG: 10 TABLET ORAL at 09:10

## 2025-01-08 RX ADMIN — BUSPIRONE HYDROCHLORIDE 15 MG: 10 TABLET ORAL at 21:17

## 2025-01-08 RX ADMIN — BUSPIRONE HYDROCHLORIDE 15 MG: 10 TABLET ORAL at 16:47

## 2025-01-08 RX ADMIN — PREGABALIN 150 MG: 75 CAPSULE ORAL at 21:17

## 2025-01-08 RX ADMIN — HYDROXYZINE HYDROCHLORIDE 25 MG: 25 TABLET ORAL at 21:17

## 2025-01-08 RX ADMIN — CEFAZOLIN SODIUM 2000 MG: 2 SOLUTION INTRAVENOUS at 22:16

## 2025-01-08 RX ADMIN — DICYCLOMINE HYDROCHLORIDE 10 MG: 20 TABLET ORAL at 16:50

## 2025-01-08 RX ADMIN — SENNOSIDES AND DOCUSATE SODIUM 2 TABLET: 50; 8.6 TABLET ORAL at 09:09

## 2025-01-08 RX ADMIN — DICYCLOMINE HYDROCHLORIDE 10 MG: 20 TABLET ORAL at 23:44

## 2025-01-08 RX ADMIN — AMLODIPINE BESYLATE 5 MG: 5 TABLET ORAL at 09:11

## 2025-01-08 RX ADMIN — HYDROXYZINE HYDROCHLORIDE 25 MG: 25 TABLET ORAL at 16:48

## 2025-01-08 RX ADMIN — ASPIRIN 81 MG: 81 TABLET, COATED ORAL at 09:09

## 2025-01-08 RX ADMIN — GUAIFENESIN AND DEXTROMETHORPHAN 10 ML: 100; 10 SYRUP ORAL at 16:51

## 2025-01-08 RX ADMIN — ACETAMINOPHEN 975 MG: 325 TABLET, FILM COATED ORAL at 05:47

## 2025-01-08 RX ADMIN — CEFAZOLIN SODIUM 2000 MG: 2 SOLUTION INTRAVENOUS at 05:47

## 2025-01-08 RX ADMIN — OXYCODONE HYDROCHLORIDE 10 MG: 10 TABLET ORAL at 09:23

## 2025-01-08 RX ADMIN — PREGABALIN 150 MG: 75 CAPSULE ORAL at 16:48

## 2025-01-08 RX ADMIN — BENZONATATE 100 MG: 100 CAPSULE ORAL at 11:43

## 2025-01-08 RX ADMIN — BENZTROPINE MESYLATE 1 MG: 1 TABLET ORAL at 17:36

## 2025-01-08 RX ADMIN — POTASSIUM CHLORIDE 20 MEQ: 1500 TABLET, EXTENDED RELEASE ORAL at 09:10

## 2025-01-08 RX ADMIN — CALCIUM CARBONATE (ANTACID) CHEW TAB 500 MG 1000 MG: 500 CHEW TAB at 22:13

## 2025-01-08 RX ADMIN — BENZONATATE 100 MG: 100 CAPSULE ORAL at 16:47

## 2025-01-08 RX ADMIN — SERTRALINE HYDROCHLORIDE 200 MG: 100 TABLET ORAL at 21:16

## 2025-01-08 RX ADMIN — PREGABALIN 150 MG: 75 CAPSULE ORAL at 09:11

## 2025-01-08 NOTE — ASSESSMENT & PLAN NOTE
Potassium 3.4, suspect in setting of loose stool with bowel regimen and nebulizer treatment  Repleted PO

## 2025-01-08 NOTE — ASSESSMENT & PLAN NOTE
3 weeks s/p removal hardware L2-S1, posterior fusion L5-S1, revision instrumentation L2-pelvis, bilateral S2 alar screws with Dr. Bills and Dr. Stevens on 12/19.  Overall improvement.  CRP improved from 231.1 > 224.7 > 198.9 most recently on 1/5/25  Leukocytosis resolved and patient has been afebrile   Also being treated for RSV bronchitis per SLIM  Currently on ancef q8h  We would recommend that she is discharged on oral antibiotics  Prevena vac was taken down by attending physician yesterday, 1/7.  Dry dressings were placed in the form of gauze pads and Tegaderm.  Patient should remain in-house a minimum of 2 more days so that we can monitor her wound.  We will examine dressings daily and change dressings every 3-4 days  WBAT bilateral upper and lower extremities  LSO brace when OOB  PT/OT, recommending level 2 rehab  Pain control, DVT ppx per primary

## 2025-01-08 NOTE — ASSESSMENT & PLAN NOTE
Patient with average hemoglobin 12-13 in 2023, averaging in the 9's since her surgery in December  No evidence of bleeding on exam  Reviewed EGD and colonoscopy from February 2024  Check iron panel

## 2025-01-08 NOTE — ASSESSMENT & PLAN NOTE
Patient was seen at [facility] in November 202, reviewed hospital stay  With history of severe major depressive disorder and generalized anxiety disorder with panic attacks  Continue PTA medication with Abilify 10 mg qd, Zoloft 200 mg qhs, BuSpar 15 mg TID, Minipress 2 mg qhs and trazodone qhs prn, atarax 25 mg tid  Patient is also on Ingrezza which is nonformulary here for tar dive dyskinesia

## 2025-01-08 NOTE — PROGRESS NOTES
Progress Note - Hospitalist   Name: Samantha Rodriguez 59 y.o. female I MRN: [id number]  Unit/Bed#: [place] [address] -01 I Date of Admission: 1/2/2025   Date of Service: 1/8/2025 I Hospital Day: 5    Assessment & Plan  Ambulatory dysfunction  Patient reports after getting back from rehab she has been struggling to walk secondary to her back pain  She typically ambulates with a walker  Continue PT/OT - recommending rehab. CM following for safe discharge planning  Lumbar radiculopathy  Status post removal L2-S1 hardware after failure of therapy, with new L5-S1 fusion performed by Dr. Stevens on 12/19 without noted complication  Patient continues to have pain limiting her ambulation although notes that her pain is improving  Without red flag signs or symptoms  Previous provider reviewed CT scan with the radiologist which did not show any central canal narrowing  Orthopedics following and managing Prevena iVAC over incision site  Recommending LSO brace with OOB  Pain control with scheduled Tylenol, oxycodone as needed, Lyrica. Patient was not getting any relief with home morphine  Planning for rehab on discharge  Wound dehiscence  Reviewed images with wound care from admission.  Discussed with orthopedist after reviewing quick note who noted small area of dehiscence at the distal aspect of the wound  They note no current active drainage or purulence and the wound VAC has been removed  Orthopedics requesting patient remain in house for daily dressing changes and monitoring  Patient is on IV Ancef which is treating cystitis, this is providing empiric coverage and orthopedics is recommending to continue this  Fortunately she remains without bacteremia  SIRS (systemic inflammatory response syndrome) (HCC)  SIRS on admission noted with fever of 101.2 on 1/5/2025  No further fever since.  Remains without leukocytosis  Suspect this was in setting of UTI and RSV  Blood cultures remain negative to date  Management of wound as stated  above  RSV bronchitis  With coughing and wheezing, tested positive for RSV on 1/3/2025  Patient was started on IV steroids with scheduled nebulizers  Status post 3 days IV steroids, wheezing has improved.  She remains on room air  Chest x-ray without acute findings  Started on oral prednisone 40 mg today  Continue antitussives and supportive care  Nebulizer treatment as needed  Urinary tract infection  Urine cultures obtained on admission growing >100,000 CFU of E.coli  Patient denies any dysuria or urgency but does admit to increased urinary frequency  Received 1 dose of ceftriaxone and 5 days of IV cefazolin  Patient has received adequate IV antibiotic treatment for this  Essential hypertension  Blood pressure is well-controlled.  Continue home amlodipine 5 mg with hold parameters  Bipolar disorder (HCC)  Patient was seen at [facility] in November 202, reviewed hospital stay  With history of severe major depressive disorder and generalized anxiety disorder with panic attacks  Continue PTA medication with Abilify 10 mg qd, Zoloft 200 mg qhs, BuSpar 15 mg TID, Minipress 2 mg qhs and trazodone qhs prn, atarax 25 mg tid  Patient is also on Ingrezza which is nonformulary here for tar dive dyskinesia  Slow transit constipation  With history of constipation, continue bowel regimen in setting of narcotics  Patient notes stool output  Anemia  Patient with average hemoglobin 12-13 in 2023, averaging in the 9's since her surgery in December  No evidence of bleeding on exam  Reviewed EGD and colonoscopy from February 2024  Check iron panel  GERD without esophagitis  Continue PPI  Tardive dyskinesia  Patient is on Ingrezza which is non formulary. Attempted to call family with no answer to bring this in  Follows OP psychiatry and Neurology for this  Patient was on Cogentin 1 mg bid in the past, will substitute with this for now  Hypokalemia  Potassium 3.4, suspect in setting of loose stool with bowel regimen and nebulizer  treatment  Repleted PO  Chronic pain disorder  Was seen [facility]PS previously, medication management as above  History of CVA (cerebrovascular accident)  Reviewed OP neuro note 6/2024, continue aspirin and statin    VTE Pharmacologic Prophylaxis: VTE Score: 3 Moderate Risk (Score 3-4) - Pharmacological DVT Prophylaxis Ordered: enoxaparin (Lovenox).    Mobility:   Basic Mobility Inpatient Raw Score: 18  JH-HLM Goal: 6: Walk 10 steps or more  JH-HLM Achieved: 6: Walk 10 steps or more  JH-HLM Goal NOT achieved. Continue with multidisciplinary rounding and encourage appropriate mobility to improve upon JH-HLM goals.    Patient Centered Rounds: I performed bedside rounds with nursing staff today.   Discussions with Specialists or Other Care Team Provider: Orthopedics, Wound care, Case management    Education and Discussions with Family / Patient: Attempted to call both son and daughter with number listed in chart, no answer.    Current Length of Stay: 5 day(s)  Current Patient Status: Inpatient   Certification Statement: The patient will continue to require additional inpatient hospital stay due to wound monitoring per orthopedics  Discharge Plan: Anticipate discharge in 48-72 hrs to rehab facility.    Code Status: Level 1 - Full Code    Subjective   Patient seen and examined. She is coughing bit. Denies fevers, chills or chest pressure, she has some sharp pain across her abdomen. Ate some breakfast but the toast was too hard. No nausea or vomiting. Urinating without difficulty, has senssation when she has to urinate. Has been having loose BM's per nursing and she does not need MIralx this am. Wants her Ingrezza. Still having back pain, it is improving with pain medications.     Objective :  Temp:  [98 °F (36.7 °C)-98.3 °F (36.8 °C)] 98.3 °F (36.8 °C)  HR:  [64-67] 64  BP: (116-128)/(63-77) 116/63  Resp:  [20] 20  SpO2:  [97 %-99 %] 98 %  O2 Device: None (Room air)    Body mass index is 38.24 kg/m².     Input and Output Summary  (last 24 hours):     Intake/Output Summary (Last 24 hours) at 1/8/2025 1010  Last data filed at 1/8/2025 0601  Gross per 24 hour   Intake 960 ml   Output 400 ml   Net 560 ml       Physical Exam  Constitutional:       Appearance: Normal appearance. She is not ill-appearing or diaphoretic.   Cardiovascular:      Rate and Rhythm: Normal rate and regular rhythm.   Pulmonary:      Effort: Pulmonary effort is normal.      Breath sounds: No rhonchi or rales.      Comments: On room air, decreased aeration throughout  Abdominal:      General: Bowel sounds are normal.      Palpations: Abdomen is soft.      Tenderness: There is no guarding or rebound.      Comments: Mild epigastric tenderness   Musculoskeletal:      Right lower leg: No edema.      Left lower leg: No edema.      Comments: Lumbar spine down to sacral spine dressing covered. Clean dry intact   Skin:     General: Skin is warm and dry.   Neurological:      General: No focal deficit present.      Mental Status: She is alert. Mental status is at baseline.      Motor: Weakness (generalized) present.      Comments: Tardive dyskinesia         Lab Results: I have reviewed the following results:   Results from last 7 days   Lab Units 01/08/25  0528 01/04/25  0539 01/03/25  0315 01/02/25  2200   WBC Thousand/uL 4.56   < > 9.50 11.19*   HEMOGLOBIN g/dL 9.2*   < > 9.3* 10.5*   HEMATOCRIT % 29.5*   < > 28.7* 32.7*   PLATELETS Thousands/uL 476*   < > 405* 434*   BANDS PCT %  --   --   --  1   SEGS PCT %  --   --  81*  --    LYMPHO PCT %  --   --  9* 2*   MONO PCT %  --   --  8 7   EOS PCT %  --   --  1 0    < > = values in this interval not displayed.     Results from last 7 days   Lab Units 01/08/25  0528   SODIUM mmol/L 142   POTASSIUM mmol/L 3.4*   CHLORIDE mmol/L 104   CO2 mmol/L 30   BUN mg/dL 8   CREATININE mg/dL 0.66   ANION GAP mmol/L 8   CALCIUM mg/dL 8.2*   GLUCOSE RANDOM mg/dL 91     Results from last 7 days   Lab Units 01/06/25  0720 01/05/25  1611 01/04/25  2110    POC GLUCOSE mg/dl 102 163* 119     Results from last 7 days   Lab Units 01/03/25  0315   LACTIC ACID mmol/L 0.8   PROCALCITONIN ng/ml 0.36*     Recent Cultures (last 7 days):   Results from last 7 days   Lab Units 01/03/25  0626 01/03/25  0316   BLOOD CULTURE   --  No Growth After 5 Days.  No Growth After 5 Days.   URINE CULTURE  >100,000 cfu/ml Escherichia coli*  50,000-59,000 cfu/ml  --      Last 24 Hours Medication List:     Current Facility-Administered Medications:     acetaminophen (TYLENOL) tablet 975 mg, Q8H KESHIA    albuterol (PROVENTIL HFA,VENTOLIN HFA) inhaler 2 puff, Q4H PRN    aluminum-magnesium hydroxide-simethicone (MAALOX) oral suspension 30 mL, Q4H PRN    amLODIPine (NORVASC) tablet 5 mg, Daily    ARIPiprazole (ABILIFY) tablet 10 mg, Daily    aspirin (ECOTRIN LOW STRENGTH) EC tablet 81 mg, Daily    atorvastatin (LIPITOR) tablet 40 mg, Daily With Dinner    bisacodyl (DULCOLAX) rectal suppository 10 mg, Daily PRN    busPIRone (BUSPAR) tablet 15 mg, TID    ceFAZolin (ANCEF) IVPB (premix in dextrose) 2,000 mg 50 mL, Q8H, Last Rate: 2,000 mg (01/08/25 0547)    dextromethorphan-guaiFENesin (ROBITUSSIN DM) oral syrup 10 mL, TID    enoxaparin (LOVENOX) subcutaneous injection 40 mg, Daily    hydrOXYzine HCL (ATARAX) tablet 25 mg, TID    ipratropium-albuterol (DUO-NEB) 0.5-2.5 mg/3 mL inhalation solution 3 mL, Q6H PRN    lidocaine (LIDODERM) 5 % patch 1 patch, Daily    lubiprostone (AMITIZA) capsule 8 mcg, BID    ondansetron (ZOFRAN) injection 4 mg, Q6H PRN    oxyCODONE (ROXICODONE) IR tablet 5 mg, Q6H PRN **OR** oxyCODONE (ROXICODONE) immediate release tablet 10 mg, Q6H PRN    pantoprazole (PROTONIX) EC tablet 40 mg, Early Morning    pneumococcal 20-cheryl conj vacc (PREVNAR 20) IM Injection 0.5 mL, Once PRN    polyethylene glycol (MIRALAX) packet 17 g, Daily    prazosin (MINIPRESS) capsule 2 mg, HS    predniSONE tablet 40 mg, Daily    pregabalin (LYRICA) capsule 150 mg, TID    senna-docusate sodium (SENOKOT  S) 8.6-50 mg per tablet 2 tablet, BID    sertraline (ZOLOFT) tablet 200 mg, HS    traZODone (DESYREL) tablet 300 mg, HS PRN    Valbenazine Tosylate CAPS 1 capsule, Daily    Administrative Statements   Today, Patient Was Seen By: Priscilla Kan PA-C    **Please Note: This note may have been constructed using a voice recognition system.**

## 2025-01-08 NOTE — ASSESSMENT & PLAN NOTE
Urine cultures obtained on admission growing >100,000 CFU of E.coli  Patient denies any dysuria or urgency but does admit to increased urinary frequency  Received 1 dose of ceftriaxone and 5 days of IV cefazolin  Patient has received adequate IV antibiotic treatment for this

## 2025-01-08 NOTE — ASSESSMENT & PLAN NOTE
Reviewed images with wound care from admission.  Discussed with orthopedist after reviewing quick note who noted small area of dehiscence at the distal aspect of the wound  They note no current active drainage or purulence and the wound VAC has been removed  Orthopedics requesting patient remain in house for daily dressing changes and monitoring  Patient is on IV Ancef which is treating cystitis, this is providing empiric coverage and orthopedics is recommending to continue this  Fortunately she remains without bacteremia

## 2025-01-08 NOTE — CASE MANAGEMENT
[facility] received request for authorization from Care Manager.  Authorization request submitted for: SNF  Facility Name: [facility]  NPI: [id number]  Facility MD: Junito Mathis NPI: [id number]   Authorization initiated by contacting insurance: [employer]   Via: H&CC Portal   Clinicals submitted via Portal attachment   Pending Reference #: [id number]     Care Manager notified: Concepcion Banegas     Updates to authorization status will be noted in chart. Please reach out to CM for updates on any clinical information.

## 2025-01-08 NOTE — ASSESSMENT & PLAN NOTE
With coughing and wheezing, tested positive for RSV on 1/3/2025  Patient was started on IV steroids with scheduled nebulizers  Status post 3 days IV steroids, wheezing has improved.  She remains on room air  Chest x-ray without acute findings  Started on oral prednisone 40 mg today  Continue antitussives and supportive care  Nebulizer treatment as needed

## 2025-01-08 NOTE — ASSESSMENT & PLAN NOTE
SIRS on admission noted with fever of 101.2 on 1/5/2025  No further fever since.  Remains without leukocytosis  Suspect this was in setting of UTI and RSV  Blood cultures remain negative to date  Management of wound as stated above

## 2025-01-08 NOTE — PLAN OF CARE
Problem: PAIN - ADULT  Goal: Verbalizes/displays adequate comfort level or baseline comfort level  Description: Interventions:  - Encourage patient to monitor pain and request assistance  - Assess pain using appropriate pain scale  - Administer analgesics based on type and severity of pain and evaluate response  - Implement non-pharmacological measures as appropriate and evaluate response  - Consider cultural and social influences on pain and pain management  - Notify physician/advanced practitioner if interventions unsuccessful or patient reports new pain  Outcome: Progressing     Problem: INFECTION - ADULT  Goal: Absence or prevention of progression during hospitalization  Description: INTERVENTIONS:  - Assess and monitor for signs and symptoms of infection  - Monitor lab/diagnostic results  - Monitor all insertion sites, i.e. indwelling lines, tubes, and drains  - Monitor endotracheal if appropriate and nasal secretions for changes in amount and color  - [place] appropriate cooling/warming therapies per order  - Administer medications as ordered  - Instruct and encourage patient and family to use good hand hygiene technique  - Identify and instruct in appropriate isolation precautions for identified infection/condition  Outcome: Progressing     Problem: SAFETY ADULT  Goal: Patient will remain free of falls  Description: INTERVENTIONS:  - Educate patient/family on patient safety including physical limitations  - Instruct patient to call for assistance with activity   - Consult OT/PT to assist with strengthening/mobility   - Keep Call bell within reach  - Keep bed low and locked with side rails adjusted as appropriate  - Keep care items and personal belongings within reach  - Initiate and maintain comfort rounds  - Make Fall Risk Sign visible to staff  - Offer Toileting every 2 Hours, in advance of need  - Initiate/Maintain bed alarm  - Obtain necessary fall risk management equipment: bed alarms   - Apply  yellow socks and bracelet for high fall risk patients  - Consider moving patient to room near nurses station  Outcome: Progressing  Goal: Maintain or return to baseline ADL function  Description: INTERVENTIONS:  -  Assess patient's ability to carry out ADLs; assess patient's baseline for ADL function and identify physical deficits which impact ability to perform ADLs (bathing, care of mouth/teeth, toileting, grooming, dressing, etc.)  - Assess/evaluate cause of self-care deficits   - Assess range of motion  - Assess patient's mobility; develop plan if impaired  - Assess patient's need for assistive devices and provide as appropriate  - Encourage maximum independence but intervene and supervise when necessary  - Involve family in performance of ADLs  - Assess for home care needs following discharge   - Consider OT consult to assist with ADL evaluation and planning for discharge  - Provide patient education as appropriate  Outcome: Progressing  Goal: Maintains/Returns to pre admission functional level  Description: INTERVENTIONS:  - Perform AM-PAC 6 Click Basic Mobility/ Daily Activity assessment daily.  - Set and communicate daily mobility goal to care team and patient/family/caregiver.   - Collaborate with rehabilitation services on mobility goals if consulted  - Perform Range of Motion 4 times a day.  - Reposition patient every 2 hours.  - Dangle patient 3 times a day  - Stand patient 3 times a day  - Ambulate patient 3 times a day  - Out of bed to chair 3 times a day   - Out of bed for meals 3 times a day  - Out of bed for toileting  - Record patient progress and toleration of activity level   Outcome: Progressing     Problem: DISCHARGE PLANNING  Goal: Discharge to home or other facility with appropriate resources  Description: INTERVENTIONS:  - Identify barriers to discharge w/patient and caregiver  - Arrange for needed discharge resources and transportation as appropriate  - Identify discharge learning needs  (meds, wound care, etc.)  - Arrange for interpretive services to assist at discharge as needed  - Refer to Case Management Department for coordinating discharge planning if the patient needs post-hospital services based on physician/advanced practitioner order or complex needs related to functional status, cognitive ability, or social support system  Outcome: Progressing     Problem: Knowledge Deficit  Goal: Patient/family/caregiver demonstrates understanding of disease process, treatment plan, medications, and discharge instructions  Description: Complete learning assessment and assess knowledge base.  Interventions:  - Provide teaching at level of understanding  - Provide teaching via preferred learning methods  Outcome: Progressing     Problem: SKIN/TISSUE INTEGRITY - ADULT  Goal: Skin Integrity remains intact(Skin Breakdown Prevention)  Description: Assess:  -Perform Ignacio assessment every shift  -Clean and moisturize skin every shift  -Inspect skin when repositioning, toileting, and assisting with ADLS  -Assess under medical devices such as Masimo every shift  -Assess extremities for adequate circulation and sensation     Bed Management:  -Have minimal linens on bed & keep smooth, unwrinkled  -Change linens as needed when moist or perspiring  -Avoid sitting or lying in one position for more than 1 hours while in bed  -Keep HOB at 30 degrees     Toileting:  -Offer bedside commode  -Assess for incontinence every shift  -Use incontinent care products after each incontinent episode such as shield wipes     Activity:  -Mobilize patient 3 times a day  -Encourage activity and walks on unit  -Encourage or provide ROM exercises   -Turn and reposition patient every 2 Hours  -Use appropriate equipment to lift or move patient in bed  -Instruct/ Assist with weight shifting every 1 hour when out of bed in chair  -Consider limitation of chair time 1 hour intervals    Skin Care:  -Avoid use of baby powder, tape, friction and  shearing, hot water or constrictive clothing  -Relieve pressure over bony prominences using wedges   -Do not massage red bony areas    Next Steps:  -Teach patient strategies to minimize risks such as skin breakdown   -Consider consults to  interdisciplinary teams such as wound care nurse   Outcome: Progressing  Goal: Incision(s), wounds(s) or drain site(s) healing without S/S of infection  Description: INTERVENTIONS  - Assess and document dressing, incision, wound bed, drain sites and surrounding tissue  - Provide patient and family education  - Perform skin care/dressing changes every shift  Outcome: Progressing     Problem: HEMATOLOGIC - ADULT  Goal: Maintains hematologic stability  Description: INTERVENTIONS  - Assess for signs and symptoms of bleeding or hemorrhage  - Monitor labs  - Administer supportive blood products/factors as ordered and appropriate  Outcome: Progressing     Problem: MUSCULOSKELETAL - ADULT  Goal: Maintain or return mobility to safest level of function  Description: INTERVENTIONS:  - Assess patient's ability to carry out ADLs; assess patient's baseline for ADL function and identify physical deficits which impact ability to perform ADLs (bathing, care of mouth/teeth, toileting, grooming, dressing, etc.)  - Assess/evaluate cause of self-care deficits   - Assess range of motion  - Assess patient's mobility  - Assess patient's need for assistive devices and provide as appropriate  - Encourage maximum independence but intervene and supervise when necessary  - Involve family in performance of ADLs  - Assess for home care needs following discharge   - Consider OT consult to assist with ADL evaluation and planning for discharge  - Provide patient education as appropriate  Outcome: Progressing  Goal: Maintain proper alignment of affected body part  Description: INTERVENTIONS:  - Support, maintain and protect limb and body alignment  - Provide patient/ family with appropriate education  Outcome:  Progressing

## 2025-01-08 NOTE — PROGRESS NOTES
Progress Note - Orthopedics   Name: Samantha Rodriguez 59 y.o. female I MRN: [id number]  Unit/Bed#: [place] [address] -01 I Date of Admission: 1/2/2025   Date of Service: 1/8/2025 I Hospital Day: 5    Assessment & Plan  Ambulatory dysfunction  3 weeks s/p removal hardware L2-S1, posterior fusion L5-S1, revision instrumentation L2-pelvis, bilateral S2 alar screws with Dr. Bills and Dr. Stevens on 12/19.  Overall improvement.  CRP improved from 231.1 > 224.7 > 198.9 most recently on 1/5/25  Leukocytosis resolved and patient has been afebrile   Also being treated for RSV bronchitis per SLIM  Currently on ancef q8h  We would recommend that she is discharged on oral antibiotics  Prevena vac was taken down by attending physician yesterday, 1/7.  Dry dressings were placed in the form of gauze pads and Tegaderm.  Patient should remain in-house a minimum of 2 more days so that we can monitor her wound.  We will examine dressings daily and change dressings every 3-4 days  WBAT bilateral upper and lower extremities  LSO brace when OOB  PT/OT, recommending level 2 rehab  Pain control, DVT ppx per primary    Orthopedics service will follow.  Patient should remain in-house another 2 days for wound monitoring.    Subjective   59 y.o.female seen and evaluated at bedside.  No new acute overnight events, no new complaints.  Pain well-controlled since she just had pain medicine. Denies fevers or chills.    Objective :  Temp:  [98 °F (36.7 °C)-98.3 °F (36.8 °C)] 98.3 °F (36.8 °C)  HR:  [64-67] 64  BP: (116-128)/(63-77) 116/63  Resp:  [20] 20  SpO2:  [97 %-99 %] 97 %  O2 Device: None (Room air)    Physical Exam  Vitals and nursing note reviewed.   Constitutional:       General: She is not in acute distress.     Appearance: She is well-developed.   HENT:      Head: Normocephalic and atraumatic.   Eyes:      Conjunctiva/sclera: Conjunctivae normal.   Cardiovascular:      Rate and Rhythm: Normal rate.   Pulmonary:      Effort: Pulmonary  "effort is normal. No respiratory distress.   Abdominal:      Palpations: Abdomen is soft.      Tenderness: There is no abdominal tenderness.   Musculoskeletal:      Cervical back: Neck supple.   Skin:     General: Skin is warm and dry.      Capillary Refill: Capillary refill takes less than 2 seconds.   Neurological:      Mental Status: She is alert.   Psychiatric:         Mood and Affect: Mood normal.       Musculoskeletal: Lower back  Visible skin is without erythema or ecchymosis.  Distal dressing has drainage present.  Dressings are currently intact.  TTP over the lowe back  Sensation intact over the toes  Extremities WWP      Lab Results: I have reviewed the following results:  Recent Labs     01/06/25  0511 01/07/25  0437 01/08/25  0528   WBC 5.63 4.72 4.56   HGB 9.2* 9.4* 9.2*   HCT 29.9* 30.6* 29.5*   * 483* 476*   BUN 8 7 8   CREATININE 0.59* 0.61 0.66     Blood Culture:    Lab Results   Component Value Date    BLOODCX No Growth After 5 Days. 01/03/2025    BLOODCX No Growth After 5 Days. 01/03/2025     Wound Culture: No results found for: \"WOUNDCULT\"  "

## 2025-01-08 NOTE — ASSESSMENT & PLAN NOTE
Sacral wound vs distal wound dehisence. Reviewed previous provider notes, wound care and orthopedics.  Orthopedist does not feel this represents dehiscence but more likely sacral wound and a wound VAC is in place  Patient is on antibiotics for cystitis as stated below which is providing empiric coverage

## 2025-01-08 NOTE — CASE MANAGEMENT
Case Management Discharge Planning Note    Patient name Samantha Rodriguez  Location [facility] M* MRN [id number]  : 1965 Date 2025       Current Admission Date: 2025  Current Admission Diagnosis:Ambulatory dysfunction   Patient Active Problem List    Diagnosis Date Noted Date Diagnosed    Urinary tract infection 2025     Wound of sacral region 2025     RSV bronchitis 2025     Acute blood loss anemia 2024     Pain associated with surgical procedure 2024     Toxic encephalopathy 2024     Severe episode of recurrent major depressive disorder, without psychotic features (formerly [facility]) 2024     Chronic low back pain, unspecified back pain laterality, unspecified whether sciatica present 2024     Opioid-induced constipation 2024     Weight gain 2024     Numbness 2023     Memory loss 2023     Hemiplegia, post-stroke (formerly [facility]) 2022     Hypokalemia 2022     Slow transit constipation 03/15/2022     CVA (cerebral vascular accident) (formerly [facility]) 2022     Mixed hyperlipidemia 2021     History of CVA (cerebrovascular accident) 2021     Dysphagia 2020     Ambulatory dysfunction 2020     Status post insertion of spinal cord stimulator 2020     SIRS (systemic inflammatory response syndrome) (formerly [facility]) 2020     Tardive dyskinesia 2020     MIMI (obstructive sleep apnea)      Obesity, morbid (formerly [facility]) 2020     Post laminectomy syndrome 10/07/2019     Bipolar I disorder, most recent episode depressed (formerly [facility]) 2019 09/15/2023    Spinal stenosis of lumbar region with neurogenic claudication 2018     Lumbar radiculopathy 2017     Chronic pain disorder 2017     Lumbar spondylosis 10/31/2016     Generalized anxiety disorder with panic attacks 10/26/2016     Bipolar disorder (formerly [facility]) 2015     PTSD (post-traumatic stress disorder) 2015     Panic disorder without agoraphobia  06/18/2015     Tremor 06/12/2014     Migraine 05/27/2014     GERD without esophagitis 05/01/2014     Cognitive disorder 04/18/2014     Overactive bladder 09/26/2013     Mood insomnia (HCC) 01/31/2013     Urinary incontinence 09/24/2012     Vitamin D deficiency 09/18/2012     Fibromyalgia 09/14/2012     Essential hypertension 09/14/2012       LOS (days): 5  Geometric Mean LOS (GMLOS) (days):   Days to GMLOS:     OBJECTIVE:  Risk of Unplanned Readmission Score: 31.8         Current admission status: Inpatient   Preferred Pharmacy:   [facility] [place], [place] - [address]  [address] [address]  Phone: [phone] Fax: [phone]    [facility]/pharmacy #[id number] - [place] - [address]  [address]  [place] [address]  Phone: [phone] Fax: [phone]    [facility] SPECIALTY [place]  [place] - [address]  [address] [place] [address]  Phone: [phone] Fax: [phone]    [facility] - [place], [place] - [address]  [address] [facility] [address]  Phone: [phone] Fax: [phone]    [facility] - [place] - [address]  [address]  [place] [address]  Phone: [phone] Fax: [phone]    Primary Care Provider: Glo Valente DO    Primary Insurance: [employer]  REP  Secondary Insurance: [facility]    DISCHARGE DETAILS:      Other Referral/Resources/Interventions Provided:  Interventions: Short Term Rehab  Referral Comments: STR, [facility]    Treatment Team Recommendation: Short Term Rehab  Discharge Destination Plan:: Short Term Rehab        Additional Comments: CM uploaded NFCE letter for STR in [facility]. CM added CM discharge support for authorization for STR. CM informed patient discharge delay for continued wound care with Orthopedics. CM to upload [employer]  wound vac form to [facility] referral. CM to follow for further discharge planning needs.

## 2025-01-08 NOTE — ASSESSMENT & PLAN NOTE
With history of constipation, continue bowel regimen in setting of narcotics  Patient notes stool output

## 2025-01-08 NOTE — ASSESSMENT & PLAN NOTE
Patient is on Ingrezza which is non formulary. Attempted to call family with no answer to bring this in  Follows OP psychiatry and Neurology for this  Patient was on Cogentin 1 mg bid in the past, will substitute with this for now

## 2025-01-09 ENCOUNTER — APPOINTMENT (INPATIENT)
Dept: RADIOLOGY | Facility: HOSPITAL | Age: 60
End: 2025-01-09
Payer: MEDICARE

## 2025-01-09 ENCOUNTER — APPOINTMENT (INPATIENT)
Dept: NON INVASIVE DIAGNOSTICS | Facility: HOSPITAL | Age: 60
End: 2025-01-09
Payer: MEDICARE

## 2025-01-09 ENCOUNTER — ANESTHESIA EVENT (INPATIENT)
Dept: PERIOP | Facility: HOSPITAL | Age: 60
End: 2025-01-09
Payer: MEDICARE

## 2025-01-09 ENCOUNTER — HOSPITAL ENCOUNTER (INPATIENT)
Facility: HOSPITAL | Age: 60
DRG: 857 | End: 2025-01-09
Attending: STUDENT IN AN ORGANIZED HEALTH CARE EDUCATION/TRAINING PROGRAM | Admitting: INTERNAL MEDICINE
Payer: MEDICARE

## 2025-01-09 ENCOUNTER — APPOINTMENT (INPATIENT)
Dept: CT IMAGING | Facility: HOSPITAL | Age: 60
End: 2025-01-09
Payer: MEDICARE

## 2025-01-09 VITALS
DIASTOLIC BLOOD PRESSURE: 94 MMHG | HEIGHT: 61 IN | BODY MASS INDEX: 38.29 KG/M2 | TEMPERATURE: 99.3 F | OXYGEN SATURATION: 99 % | WEIGHT: 202.82 LBS | SYSTOLIC BLOOD PRESSURE: 143 MMHG | HEART RATE: 60 BPM | RESPIRATION RATE: 18 BRPM

## 2025-01-09 DIAGNOSIS — M43.26 FUSION OF LUMBAR SPINE: ICD-10-CM

## 2025-01-09 DIAGNOSIS — T81.30XA WOUND DEHISCENCE: ICD-10-CM

## 2025-01-09 DIAGNOSIS — T84.7XXA WOUND INFECTION COMPLICATING HARDWARE (HCC): ICD-10-CM

## 2025-01-09 DIAGNOSIS — Z98.890 S/P EXPLORATORY LAPAROTOMY: Primary | ICD-10-CM

## 2025-01-09 DIAGNOSIS — R10.9 ABDOMINAL PAIN: ICD-10-CM

## 2025-01-09 PROBLEM — E87.70 VOLUME OVERLOAD: Status: ACTIVE | Noted: 2025-01-09

## 2025-01-09 PROBLEM — Z87.440 HISTORY OF UTI: Status: ACTIVE | Noted: 2025-01-09

## 2025-01-09 LAB
ERYTHROCYTE [DISTWIDTH] IN BLOOD BY AUTOMATED COUNT: 15 % (ref 11.6–15.1)
HCT VFR BLD AUTO: 35 % (ref 34.8–46.1)
HGB BLD-MCNC: 11.1 G/DL (ref 11.5–15.4)
MAGNESIUM SERPL-MCNC: 1.8 MG/DL (ref 1.9–2.7)
MCH RBC QN AUTO: 27 PG (ref 26.8–34.3)
MCHC RBC AUTO-ENTMCNC: 31.7 G/DL (ref 31.4–37.4)
MCV RBC AUTO: 85 FL (ref 82–98)
PLATELET # BLD AUTO: 568 THOUSANDS/UL (ref 149–390)
PMV BLD AUTO: 9.2 FL (ref 8.9–12.7)
RBC # BLD AUTO: 4.11 MILLION/UL (ref 3.81–5.12)
WBC # BLD AUTO: 7.05 THOUSAND/UL (ref 4.31–10.16)

## 2025-01-09 PROCEDURE — NC001 PR NO CHARGE: Performed by: SURGERY

## 2025-01-09 PROCEDURE — 99238 HOSP IP/OBS DSCHRG MGMT 30/<: CPT

## 2025-01-09 PROCEDURE — 99024 POSTOP FOLLOW-UP VISIT: CPT | Performed by: ORTHOPAEDIC SURGERY

## 2025-01-09 PROCEDURE — 0D9670Z DRAINAGE OF STOMACH WITH DRAINAGE DEVICE, VIA NATURAL OR ARTIFICIAL OPENING: ICD-10-PCS | Performed by: STUDENT IN AN ORGANIZED HEALTH CARE EDUCATION/TRAINING PROGRAM

## 2025-01-09 PROCEDURE — 97530 THERAPEUTIC ACTIVITIES: CPT

## 2025-01-09 PROCEDURE — RECHECK

## 2025-01-09 PROCEDURE — 85027 COMPLETE CBC AUTOMATED: CPT

## 2025-01-09 PROCEDURE — 97116 GAIT TRAINING THERAPY: CPT

## 2025-01-09 PROCEDURE — 74018 RADEX ABDOMEN 1 VIEW: CPT

## 2025-01-09 PROCEDURE — 74177 CT ABD & PELVIS W/CONTRAST: CPT

## 2025-01-09 PROCEDURE — 83735 ASSAY OF MAGNESIUM: CPT

## 2025-01-09 RX ORDER — ARIPIPRAZOLE 10 MG/1
10 TABLET ORAL DAILY
Status: CANCELLED | OUTPATIENT
Start: 2025-01-10

## 2025-01-09 RX ORDER — PREGABALIN 75 MG/1
150 CAPSULE ORAL 3 TIMES DAILY
Status: CANCELLED | OUTPATIENT
Start: 2025-01-09

## 2025-01-09 RX ORDER — LUBIPROSTONE 8 UG/1
8 CAPSULE ORAL 2 TIMES DAILY
Status: CANCELLED | OUTPATIENT
Start: 2025-01-09

## 2025-01-09 RX ORDER — ACETAMINOPHEN 10 MG/ML
1000 INJECTION, SOLUTION INTRAVENOUS EVERY 8 HOURS PRN
Status: CANCELLED | OUTPATIENT
Start: 2025-01-09

## 2025-01-09 RX ORDER — ASPIRIN 81 MG/1
81 TABLET ORAL DAILY
Status: CANCELLED | OUTPATIENT
Start: 2025-01-10

## 2025-01-09 RX ORDER — BENZTROPINE MESYLATE 1 MG/1
1 TABLET ORAL 2 TIMES DAILY
Status: CANCELLED | OUTPATIENT
Start: 2025-01-09

## 2025-01-09 RX ORDER — ACETAMINOPHEN 325 MG/1
975 TABLET ORAL EVERY 8 HOURS SCHEDULED
Status: CANCELLED | OUTPATIENT
Start: 2025-01-09

## 2025-01-09 RX ORDER — BISACODYL 10 MG
10 SUPPOSITORY, RECTAL RECTAL DAILY PRN
Status: CANCELLED | OUTPATIENT
Start: 2025-01-09

## 2025-01-09 RX ORDER — PRAZOSIN HYDROCHLORIDE 2 MG/1
2 CAPSULE ORAL
Status: CANCELLED | OUTPATIENT
Start: 2025-01-09

## 2025-01-09 RX ORDER — SODIUM CHLORIDE 9 MG/ML
50 INJECTION, SOLUTION INTRAVENOUS CONTINUOUS
Status: DISCONTINUED | OUTPATIENT
Start: 2025-01-09 | End: 2025-01-09

## 2025-01-09 RX ORDER — AMLODIPINE BESYLATE 5 MG/1
5 TABLET ORAL DAILY
Status: CANCELLED | OUTPATIENT
Start: 2025-01-10

## 2025-01-09 RX ORDER — SERTRALINE HYDROCHLORIDE 100 MG/1
200 TABLET, FILM COATED ORAL
Status: CANCELLED | OUTPATIENT
Start: 2025-01-09

## 2025-01-09 RX ORDER — SODIUM CHLORIDE 9 MG/ML
50 INJECTION, SOLUTION INTRAVENOUS CONTINUOUS
Status: DISPENSED | OUTPATIENT
Start: 2025-01-09 | End: 2025-01-09

## 2025-01-09 RX ORDER — ACETAMINOPHEN 10 MG/ML
1000 INJECTION, SOLUTION INTRAVENOUS EVERY 8 HOURS PRN
Status: DISCONTINUED | OUTPATIENT
Start: 2025-01-09 | End: 2025-01-09 | Stop reason: HOSPADM

## 2025-01-09 RX ORDER — LIDOCAINE 50 MG/G
1 PATCH TOPICAL DAILY
Status: CANCELLED | OUTPATIENT
Start: 2025-01-09

## 2025-01-09 RX ORDER — SODIUM CHLORIDE 9 MG/ML
50 INJECTION, SOLUTION INTRAVENOUS CONTINUOUS
Status: CANCELLED | OUTPATIENT
Start: 2025-01-09 | End: 2025-01-09

## 2025-01-09 RX ORDER — ALBUTEROL SULFATE 90 UG/1
2 INHALANT RESPIRATORY (INHALATION) EVERY 4 HOURS PRN
Status: CANCELLED | OUTPATIENT
Start: 2025-01-09

## 2025-01-09 RX ORDER — ENOXAPARIN SODIUM 100 MG/ML
40 INJECTION SUBCUTANEOUS DAILY
Status: CANCELLED | OUTPATIENT
Start: 2025-01-10

## 2025-01-09 RX ORDER — POLYETHYLENE GLYCOL 3350 17 G/17G
17 POWDER, FOR SOLUTION ORAL DAILY PRN
Status: CANCELLED | OUTPATIENT
Start: 2025-01-09

## 2025-01-09 RX ORDER — OXYCODONE HYDROCHLORIDE 5 MG/1
5 TABLET ORAL EVERY 6 HOURS PRN
Refills: 0 | Status: DISCONTINUED | OUTPATIENT
Start: 2025-01-09 | End: 2025-01-09 | Stop reason: HOSPADM

## 2025-01-09 RX ORDER — PANTOPRAZOLE SODIUM 40 MG/1
40 TABLET, DELAYED RELEASE ORAL
Status: CANCELLED | OUTPATIENT
Start: 2025-01-09

## 2025-01-09 RX ORDER — GUAIFENESIN/DEXTROMETHORPHAN 100-10MG/5
10 SYRUP ORAL 3 TIMES DAILY
Status: CANCELLED | OUTPATIENT
Start: 2025-01-09

## 2025-01-09 RX ORDER — MAGNESIUM HYDROXIDE/ALUMINUM HYDROXICE/SIMETHICONE 120; 1200; 1200 MG/30ML; MG/30ML; MG/30ML
30 SUSPENSION ORAL EVERY 4 HOURS PRN
Status: CANCELLED | OUTPATIENT
Start: 2025-01-09

## 2025-01-09 RX ORDER — IPRATROPIUM BROMIDE AND ALBUTEROL SULFATE 2.5; .5 MG/3ML; MG/3ML
3 SOLUTION RESPIRATORY (INHALATION) EVERY 6 HOURS PRN
Status: CANCELLED | OUTPATIENT
Start: 2025-01-09

## 2025-01-09 RX ORDER — METOCLOPRAMIDE HYDROCHLORIDE 5 MG/ML
10 INJECTION INTRAMUSCULAR; INTRAVENOUS ONCE
Status: COMPLETED | OUTPATIENT
Start: 2025-01-09 | End: 2025-01-09

## 2025-01-09 RX ORDER — OXYCODONE HYDROCHLORIDE 5 MG/1
5 TABLET ORAL EVERY 6 HOURS PRN
Refills: 0 | Status: CANCELLED | OUTPATIENT
Start: 2025-01-09

## 2025-01-09 RX ORDER — SODIUM CHLORIDE 9 MG/ML
100 INJECTION, SOLUTION INTRAVENOUS CONTINUOUS
Status: DISCONTINUED | OUTPATIENT
Start: 2025-01-09 | End: 2025-01-09

## 2025-01-09 RX ORDER — HYDROXYZINE HYDROCHLORIDE 25 MG/1
25 TABLET, FILM COATED ORAL 3 TIMES DAILY
Status: CANCELLED | OUTPATIENT
Start: 2025-01-09

## 2025-01-09 RX ORDER — AMOXICILLIN 250 MG
1 CAPSULE ORAL 2 TIMES DAILY
Status: CANCELLED | OUTPATIENT
Start: 2025-01-09

## 2025-01-09 RX ORDER — BENZONATATE 100 MG/1
100 CAPSULE ORAL 3 TIMES DAILY
Status: CANCELLED | OUTPATIENT
Start: 2025-01-09

## 2025-01-09 RX ORDER — TRAZODONE HYDROCHLORIDE 100 MG/1
300 TABLET ORAL
Status: CANCELLED | OUTPATIENT
Start: 2025-01-09

## 2025-01-09 RX ORDER — ATORVASTATIN CALCIUM 40 MG/1
40 TABLET, FILM COATED ORAL
Status: CANCELLED | OUTPATIENT
Start: 2025-01-09

## 2025-01-09 RX ORDER — CEFAZOLIN SODIUM 2 G/50ML
2000 SOLUTION INTRAVENOUS EVERY 8 HOURS
Status: CANCELLED | OUTPATIENT
Start: 2025-01-09

## 2025-01-09 RX ORDER — ONDANSETRON 2 MG/ML
4 INJECTION INTRAMUSCULAR; INTRAVENOUS EVERY 6 HOURS PRN
Status: CANCELLED | OUTPATIENT
Start: 2025-01-09

## 2025-01-09 RX ADMIN — SODIUM CHLORIDE 100 ML/HR: 0.9 INJECTION, SOLUTION INTRAVENOUS at 13:50

## 2025-01-09 RX ADMIN — ACETAMINOPHEN 975 MG: 325 TABLET, FILM COATED ORAL at 05:16

## 2025-01-09 RX ADMIN — ASPIRIN 81 MG: 81 TABLET, COATED ORAL at 13:55

## 2025-01-09 RX ADMIN — METOCLOPRAMIDE 10 MG: 5 INJECTION, SOLUTION INTRAMUSCULAR; INTRAVENOUS at 05:22

## 2025-01-09 RX ADMIN — BENZONATATE 100 MG: 100 CAPSULE ORAL at 13:54

## 2025-01-09 RX ADMIN — ENOXAPARIN SODIUM 40 MG: 40 INJECTION SUBCUTANEOUS at 13:55

## 2025-01-09 RX ADMIN — BUSPIRONE HYDROCHLORIDE 15 MG: 10 TABLET ORAL at 13:55

## 2025-01-09 RX ADMIN — BENZTROPINE MESYLATE 1 MG: 1 TABLET ORAL at 13:56

## 2025-01-09 RX ADMIN — LIDOCAINE 1 PATCH: 50 PATCH TOPICAL at 21:14

## 2025-01-09 RX ADMIN — OXYCODONE HYDROCHLORIDE 10 MG: 10 TABLET ORAL at 02:24

## 2025-01-09 RX ADMIN — CEFAZOLIN SODIUM 2000 MG: 2 SOLUTION INTRAVENOUS at 05:28

## 2025-01-09 RX ADMIN — CEFAZOLIN SODIUM 2000 MG: 2 SOLUTION INTRAVENOUS at 21:15

## 2025-01-09 RX ADMIN — CEFAZOLIN SODIUM 2000 MG: 2 SOLUTION INTRAVENOUS at 13:48

## 2025-01-09 RX ADMIN — HYDROXYZINE HYDROCHLORIDE 25 MG: 25 TABLET ORAL at 13:56

## 2025-01-09 RX ADMIN — PREGABALIN 150 MG: 75 CAPSULE ORAL at 13:57

## 2025-01-09 RX ADMIN — PREDNISONE 40 MG: 20 TABLET ORAL at 13:56

## 2025-01-09 RX ADMIN — PANTOPRAZOLE SODIUM 40 MG: 40 TABLET, DELAYED RELEASE ORAL at 05:18

## 2025-01-09 RX ADMIN — IOHEXOL 100 ML: 350 INJECTION, SOLUTION INTRAVENOUS at 12:12

## 2025-01-09 RX ADMIN — AMLODIPINE BESYLATE 5 MG: 5 TABLET ORAL at 13:56

## 2025-01-09 RX ADMIN — ARIPIPRAZOLE 10 MG: 10 TABLET ORAL at 13:55

## 2025-01-09 RX ADMIN — GUAIFENESIN AND DEXTROMETHORPHAN 10 ML: 100; 10 SYRUP ORAL at 13:55

## 2025-01-09 RX ADMIN — IOHEXOL 50 ML: 240 INJECTION, SOLUTION INTRATHECAL; INTRAVASCULAR; INTRAVENOUS; ORAL at 12:12

## 2025-01-09 RX ADMIN — SODIUM CHLORIDE 50 ML/HR: 0.9 INJECTION, SOLUTION INTRAVENOUS at 14:04

## 2025-01-09 NOTE — ASSESSMENT & PLAN NOTE
Urine cultures obtained on admission growing >100,000 CFU of E.coli  Patient has received adequate IV antibiotic treatment for this

## 2025-01-09 NOTE — ASSESSMENT & PLAN NOTE
Was seen APS previously, medication management as above  Attempting to wean off opioids, suspect potential ileus vs SBO related to narcotics

## 2025-01-09 NOTE — CONSULTS
Consultation - Surgery-General   Name: Samantha Rodriguez 59 y.o. female I MRN: [id number]  Unit/Bed#: [place] [address] -01 I Date of Admission: 1/2/2025   Date of Service: 1/9/2025 I Hospital Day: 6   Inpatient consult to Acute Care Surgery  Consult performed by: Ranjeet Morales MD  Consult ordered by: Priscilla Kan PA-C        Physician Requesting Evaluation: Justice Smallwood MD   Reason for Evaluation / Principal Problem: Small bowel obstruction    Assessment & Plan  Small bowel obstruction (HCC)  59-year-old female with new onset abdominal pain, nausea, vomiting most likely in the setting of a small bowel obstruction    Plan  N.p.o.  NG tube  IV fluid resuscitation  As needed pain meds and antinausea meds  DVT prophylaxis  Rest of care per primary team regarding previous spine surgery wound dehiscence and possible transfer to [place]  Chronic pain disorder    Lumbar radiculopathy    Bipolar disorder (HCC)    GERD without esophagitis    Essential hypertension    SIRS (systemic inflammatory response syndrome) ([facility])    Tardive dyskinesia    Ambulatory dysfunction    History of CVA (cerebrovascular accident)    Slow transit constipation    Hypokalemia    Anemia    RSV bronchitis    Urinary tract infection    Wound dehiscence    Abdominal pain    Volume overload        History of Present Illness   Samantha Rodriguez is a 59 y.o. female general surgery consulted for management of a small bowel obstruction.  Patient is currently on the medicine service for management of her previous posterior lumbar fusion wound dehiscence being managed by orthopedic surgery.  Patient supposed be transferred to [place].  Of note, patient had newer onset abdominal pain nausea vomiting.  Denies having fevers or chills.  Denies having chest pain or shortness of breath.  Still voiding without difficulty.  Was actually having diarrhea-like bowel movements.  A KUB was obtained which demonstrated small bowel dilation consistent with ileus for  small bowel obstruction.  Of note past medical history includes: anxiety, arthritis, bipolar, depression, fibromylagia, GERD, HLD, HTN, lumbar radiculopathy, migraine, obestity, PTSD, stroke (residual left sided weakness), urinary incontinence.past surgical history includes: hysterectomy, , Tubal ligation.  Patient cannot recall why she has a lower midline abdominal incision.  She states it may be from previous anterior lumbar spine surgery.  Patient is currently afebrile and hemodynamically normal.  Labs for today are still pending.  CTAP demonstrated: dilated small bowel loops concerning for obstruction. Transition point central abdomen slight swirling of mesenteric vessels. Internal hernia not excluded.  See above for assessment and plan:    Review of Systems  See above, otherwise negative    Objective :  Temp:  [98.1 °F (36.7 °C)-98.9 °F (37.2 °C)] 98.9 °F (37.2 °C)  HR:  [56-92] 59  BP: (126-165)/(75-99) 138/80  Resp:  [18] 18  SpO2:  [97 %-99 %] 99 %  O2 Device: None (Room air)    Lines/Drains/Airways       Active Status       Name Placement date Placement time Site Days    NG/OG/Enteral Tube Nasogastric 16 Fr Right nare 25  1630  Right nare  less than 1                  Physical Exam    Physical Exam:  General: No acute distress, alert and oriented  Neuro: alert, oriented x3  HENT: PERRL, EOMI  CV: Well perfused, regular rate and rhythm  Lungs: Normal work of breathing, no increased respiratory effort  Abdomen: Soft, mainly tender to palpation in the upper abdomen, distended.  Previous midline lower abdominal incision appreciated.  MSK/Extremities: No edema, clubbing or cyanosis  Skin: Warm, dry      Lab Results: I have reviewed the following results:  Recent Labs     25  0528   WBC 4.56   HGB 9.2*   HCT 29.5*   *   SODIUM 142   K 3.4*      CO2 30   BUN 8   CREATININE 0.66   GLUC 91           VTE Pharmacologic Prophylaxis: Enoxaparin (Lovenox)  VTE Mechanical Prophylaxis:  sequential compression device

## 2025-01-09 NOTE — ASSESSMENT & PLAN NOTE
Noted to have worsening epigastric sharp pain that started 1/8 overnight with an episode of vomiting  XR abdomen shows severely dilated loops concerning for SBO or ileus  CT A/P shows dilated loops of small bowel suggestive of underlying SBO with transition point in the central abdomen where there is slight swirling of the mesenteric vessel and underlying internal hernia cannot be fully excluded.  Fluid seen in the colon suggestive of diarrheal illness without colon wall thickening to suggest colitis.  Cholelithiasis.  Small ascites.  Previously noted neurostimulator leads have been removed in the interim mother does appear to have small retained fragment in the posterior lower back.  Ill-defined fluid in the pocket of the soft tissue gas and surrounding inflammatory stranding in the midline low back suspicious for abscess.  Surgery and orthopedic surgery consulted  Continue IVF  NGT placed at [facility], maintain for now  NPO

## 2025-01-09 NOTE — TRANSPORTATION MEDICAL NECESSITY
"Section I - General Information    Name of Patient: Samantha Rodriguez                 : 1965    Medicare #: [id number]  Transport Date:   (PCS is valid for round trips on this date and for all repetitive trips in the 60-day range as noted below.)  Origin: [place] [id number]                                                         Destination: [place] [address]  Is the pt's stay covered under Medicare Part A (PPS/DRG)   []     Closest appropriate facility? If no, why is transport to more distant facility required? Yes  If hospice pt, is this transport related to pt's terminal illness? NA       Section II - Medical Necessity Questionnaire  Ambulance transportation is medically necessary only if other means of transport are contraindicated or would be potentially harmful to the patient. To meet this requirement, the patient must either be \"bed confined\" or suffer from a condition such that transport by means other than ambulance is contraindicated by the patient's condition. The following questions must be answered by the medical professional signing below for this form to be valid:    1)  Describe the MEDICAL CONDITION (physical and/or mental) of this patient AT THE TIME OF AMBULANCE TRANSPORT that requires the patient to be transported in an ambulance and why transport by other means is contraindicated by the patient's condition: Wound care/ Wound Washout     2) Is the patient \"bed confined\" as defined below?     No  To be \"be confined\" the patient must satisfy all three of the following conditions: (1) unable to get up from bed without Assistance; AND (2) unable to ambulate; AND (3) unable to sit in a chair or wheelchair.    3) Can this patient safely be transported by car or wheelchair van (i.e., seated during transport without a medical attendant or monitoring)?   No    4) In addition to completing questions 1-3 above, please check any of the " following conditions that apply*:   *Note: supporting documentation for any boxes checked must be maintained in the patient's medical records.  If hosp-hosp transfer, describe services needed at 2nd facility not available at 1st facility?   Medical attendant required   Hemodynamic monitoring required en route  Unable to sit in a chair or wheelchair due to decubitus ulcers or other wounds   Other(specify) Back wound       Section III - Signature of Physician or Healthcare Professional  I certify that the above information is true and correct based on my evaluation of this patient, and represent that the patient requires transport by ambulance and that other forms of transport are contraindicated. I understand that this information will be used by the Centers for Medicare and Medicaid Services (CMS) to support the determination of medical necessity for ambulance services, and I represent that I have personal knowledge of the patient's condition at time of transport.    []  If this box is checked, I also certify that the patient is physically or mentally incapable of signing the ambulance service's claim and that the institution with which I am affiliated has furnished care, services, or assistance to the patient.    My signature below is made on behalf of the patient pursuant to 42 CFR §424.36(b)(4). In accordance with 42 CFR §424.37, the specific reason(s) that the patient is physically or mentally incapable of signing the claim form is as follows: .      Signature of Physician* or Healthcare Professional______Concepcion Banegas_________  Signature Date 01/09/25 (For scheduled repetitive transports, this form is not valid for transports performed more than 60 days after this date)    Printed Name & Credentials of Physician or Healthcare Professional (MD, DO, RN, etc.)________________________________  *Form must be signed by patient's attending physician for scheduled, repetitive transports. For non-repetitive, unscheduled  ambulance transports, if unable to obtain the signature of the attending physician, any of the following may sign (choose appropriate option below)  [] Physician Assistant []  Clinical Nurse Specialist []  Registered Nurse  []  Nurse Practitioner  [x] Discharge Planner

## 2025-01-09 NOTE — ASSESSMENT & PLAN NOTE
Placed on Senokot as with large loose bowel movements, last one 1/8/2025  Hold meds, CT abd pelvis as stated above

## 2025-01-09 NOTE — ASSESSMENT & PLAN NOTE
Daughter notes day PTA she threw up prior to coming to the emergency room   Has been having mid epigastric sharp pain worsened that started overnight  Had 1 episode of vomiting this AM after some clear soup last PM  X-ray abdomen with several dilated loops with questionable SBO or ileus.  Patient did have large loose BM yesterday and some sips of water this morning.   No significant distention on exam.  Some hypoactive bowel sounds but lower quadrants with normal active bowel sounds  Nursing to hold oral meds this morning, NPO and start IVF, obtain CT abdomen pelvis with contrast  Discussed plan with patient daughter and general surgery  Surgery consult

## 2025-01-09 NOTE — ASSESSMENT & PLAN NOTE
Was seen by [facility] previously, medication management as above  Attempting to wean off opioids, suspect potential use versus SBO related to narcotics

## 2025-01-09 NOTE — ASSESSMENT & PLAN NOTE
S/p removal hardware L2-S1, posterior fusion L5-S1, revision instrumentation L2-pelvis, bilateral S2 alar screws with orthopedic surgery on 12/19  Patient continues to have pain limiting her ambulation without any red flag symptoms  Orthopedic surgery following  Continue LSO brace with OOB  Continue pain medications

## 2025-01-09 NOTE — ASSESSMENT & PLAN NOTE
Patient is on Ingrezza which is nonformulary  Will have family bring it to be given  Outpatient follow-up with psychiatry and neurology  Substitute with Cogentin 1 mg twice daily for now

## 2025-01-09 NOTE — ASSESSMENT & PLAN NOTE
With coughing and wheezing, tested positive for RSV on 1/3/2025  Patient was started on IV steroids with scheduled nebulizers  Chest x-ray without acute findings  Completed 5-day therapy of steroids, wheezing improved on room air  Continue antitussives and supportive care  Nebulizer treatment as needed

## 2025-01-09 NOTE — ASSESSMENT & PLAN NOTE
Status post removal L2-S1 hardware after failure of therapy, with new L5-S1 fusion performed by Dr. Stevens on 12/19 without noted complication  Patient continues to have pain limiting her ambulation although notes that her pain is improving  Without red flag signs or symptoms  Previous provider reviewed CT scan with the radiologist which did not show any central canal narrowing  Orthopedics following and managing Prevena iVAC over incision site  Recommending LSO brace with OOB  Patient was not getting any relief with home morphine and started on scheduled Tylenol oxycodone as needed.  She is on Lyrica PTA  See plan below

## 2025-01-09 NOTE — ASSESSMENT & PLAN NOTE
Potassium 3.4, suspect in setting of loose stool with bowel regimen and nebulizer treatment  P.o.  Awaiting morning BMP

## 2025-01-09 NOTE — ASSESSMENT & PLAN NOTE
1/3 RSV positive  Chest x-ray without acute findings  Completed 5 days of steroid therapy  Supportive care

## 2025-01-09 NOTE — ASSESSMENT & PLAN NOTE
Noted to have small area of wound dehiscence at the distal aspect of the wound from recent surgical hardware removal  Currently on empiric IV Ancef per orthopedic recommendations  Transferred to [facility] for OR washout  1/3 blood cultures negative

## 2025-01-09 NOTE — PLAN OF CARE
Problem: PAIN - ADULT  Goal: Verbalizes/displays adequate comfort level or baseline comfort level  Description: Interventions:  - Encourage patient to monitor pain and request assistance  - Assess pain using appropriate pain scale  - Administer analgesics based on type and severity of pain and evaluate response  - Implement non-pharmacological measures as appropriate and evaluate response  - Consider cultural and social influences on pain and pain management  - Notify physician/advanced practitioner if interventions unsuccessful or patient reports new pain  Outcome: Progressing     Problem: SAFETY ADULT  Goal: Patient will remain free of falls  Description: INTERVENTIONS:  - Educate patient/family on patient safety including physical limitations  - Instruct patient to call for assistance with activity   - Consult OT/PT to assist with strengthening/mobility   - Keep Call bell within reach  - Keep bed low and locked with side rails adjusted as appropriate  - Keep care items and personal belongings within reach  - Initiate and maintain comfort rounds  - Make Fall Risk Sign visible to staff  - Offer Toileting every 2 Hours, in advance of need  - Initiate/Maintain bed alarm  - Obtain necessary fall risk management equipment: bed alarms   - Apply yellow socks and bracelet for high fall risk patients  - Consider moving patient to room near nurses station  Outcome: Progressing  Goal: Maintain or return to baseline ADL function  Description: INTERVENTIONS:  -  Assess patient's ability to carry out ADLs; assess patient's baseline for ADL function and identify physical deficits which impact ability to perform ADLs (bathing, care of mouth/teeth, toileting, grooming, dressing, etc.)  - Assess/evaluate cause of self-care deficits   - Assess range of motion  - Assess patient's mobility; develop plan if impaired  - Assess patient's need for assistive devices and provide as appropriate  - Encourage maximum independence but  intervene and supervise when necessary  - Involve family in performance of ADLs  - Assess for home care needs following discharge   - Consider OT consult to assist with ADL evaluation and planning for discharge  - Provide patient education as appropriate  Outcome: Progressing  Goal: Maintains/Returns to pre admission functional level  Description: INTERVENTIONS:  - Perform AM-PAC 6 Click Basic Mobility/ Daily Activity assessment daily.  - Set and communicate daily mobility goal to care team and patient/family/caregiver.   - Collaborate with rehabilitation services on mobility goals if consulted  - Perform Range of Motion 4 times a day.  - Reposition patient every 2 hours.  - Dangle patient 3 times a day  - Stand patient 3 times a day  - Ambulate patient 3 times a day  - Out of bed to chair 3 times a day   - Out of bed for meals 3 times a day  - Out of bed for toileting  - Record patient progress and toleration of activity level   Outcome: Progressing     Problem: DISCHARGE PLANNING  Goal: Discharge to home or other facility with appropriate resources  Description: INTERVENTIONS:  - Identify barriers to discharge w/patient and caregiver  - Arrange for needed discharge resources and transportation as appropriate  - Identify discharge learning needs (meds, wound care, etc.)  - Arrange for interpretive services to assist at discharge as needed  - Refer to Case Management Department for coordinating discharge planning if the patient needs post-hospital services based on physician/advanced practitioner order or complex needs related to functional status, cognitive ability, or social support system  Outcome: Progressing     Problem: Knowledge Deficit  Goal: Patient/family/caregiver demonstrates understanding of disease process, treatment plan, medications, and discharge instructions  Description: Complete learning assessment and assess knowledge base.  Interventions:  - Provide teaching at level of understanding  - Provide  teaching via preferred learning methods  Outcome: Progressing     Problem: SKIN/TISSUE INTEGRITY - ADULT  Goal: Skin Integrity remains intact(Skin Breakdown Prevention)  Description: Assess:  -Perform Ignacio assessment every shift  -Clean and moisturize skin every shift  -Inspect skin when repositioning, toileting, and assisting with ADLS  -Assess under medical devices such as Masimo every shift  -Assess extremities for adequate circulation and sensation     Bed Management:  -Have minimal linens on bed & keep smooth, unwrinkled  -Change linens as needed when moist or perspiring  -Avoid sitting or lying in one position for more than 1 hours while in bed  -Keep HOB at 30 degrees     Toileting:  -Offer bedside commode  -Assess for incontinence every shift  -Use incontinent care products after each incontinent episode such as shield wipes     Activity:  -Mobilize patient 3 times a day  -Encourage activity and walks on unit  -Encourage or provide ROM exercises   -Turn and reposition patient every 2 Hours  -Use appropriate equipment to lift or move patient in bed  -Instruct/ Assist with weight shifting every 1 hour when out of bed in chair  -Consider limitation of chair time 1 hour intervals    Skin Care:  -Avoid use of baby powder, tape, friction and shearing, hot water or constrictive clothing  -Relieve pressure over bony prominences using wedges   -Do not massage red bony areas    Next Steps:  -Teach patient strategies to minimize risks such as skin breakdown   -Consider consults to  interdisciplinary teams such as wound care nurse   Outcome: Progressing  Goal: Incision(s), wounds(s) or drain site(s) healing without S/S of infection  Description: INTERVENTIONS  - Assess and document dressing, incision, wound bed, drain sites and surrounding tissue  - Provide patient and family education  - Perform skin care/dressing changes every shift  Outcome: Progressing

## 2025-01-09 NOTE — ASSESSMENT & PLAN NOTE
59-year-old female with new onset abdominal pain, nausea, vomiting most likely in the setting of a small bowel obstruction    Plan  N.p.o.  NG tube  IV fluid resuscitation  As needed pain meds and antinausea meds  DVT prophylaxis  Rest of care per primary team regarding previous spine surgery wound dehiscence and possible transfer to [place]

## 2025-01-09 NOTE — ASSESSMENT & PLAN NOTE
History of severe MDD and AMAURY with panic attacks  Continue PTA medications with Abilify, Zoloft, BuSpar, Minipress, trazodone and Atarax

## 2025-01-09 NOTE — PROGRESS NOTES
Progress Note - Hospitalist   Name: Samantha Rodriguez 59 y.o. female I MRN: [id number]  Unit/Bed#: [place] [address] -01 I Date of Admission: 1/2/2025   Date of Service: 1/9/2025 I Hospital Day: 6    Assessment & Plan  Ambulatory dysfunction  Patient reports after getting back from rehab she has been struggling to walk secondary to her back pain  She typically ambulates with a walker  PT and OT are recommending rehab  Lumbar radiculopathy  Status post removal L2-S1 hardware after failure of therapy, with new L5-S1 fusion performed by Dr. Stevens on 12/19 without noted complication  Patient continues to have pain limiting her ambulation although notes that her pain is improving  Without red flag signs or symptoms  Previous provider reviewed CT scan with the radiologist which did not show any central canal narrowing  Orthopedics following and managing Prevena iVAC over incision site  Recommending LSO brace with OOB  Patient was not getting any relief with home morphine and started on scheduled Tylenol oxycodone as needed.  She is on Lyrica PTA  See plan below  Wound dehiscence  Reviewed images with wound care from admission.  Discussed with orthopedist after reviewing quick note who noted small area of dehiscence at the distal aspect of the wound  Orthopedics managing wound, wound VAC in place  Patient is on IV Ancef which has treated her cystitis, this is providing empiric coverage and orthopedics is recommending to continue this  Discussed with Dr. Bills who is recommending transfer to [facility] for OR washout.  Discussed with patient and family who is agreeable.  Fortunately she remains without bacteremia  Abdominal pain  Daughter notes day PTA she threw up prior to coming to the emergency room   Has been having mid epigastric sharp pain worsened that started overnight  Had 1 episode of vomiting this AM after some clear soup last PM  X-ray abdomen with several dilated loops with questionable SBO or  ileus.  Patient did have large loose BM yesterday and some sips of water this morning.   No significant distention on exam.  Some hypoactive bowel sounds but lower quadrants with normal active bowel sounds  Nursing to hold oral meds this morning, NPO and start IVF, obtain CT abdomen pelvis with contrast  Discussed plan with patient daughter and general surgery  Surgery consult  SIRS (systemic inflammatory response syndrome) ([facility])  SIRS on admission noted with fever of 101.2 on 1/5/2025  No further fever since.  Remains without leukocytosis  Suspect this was in setting of UTI and RSV  Blood cultures remain negative to date  Management of wound as stated above  RSV bronchitis  With coughing and wheezing, tested positive for RSV on 1/3/2025  Patient was started on IV steroids with scheduled nebulizers  Chest x-ray without acute findings  Completed 5-day therapy of steroids, wheezing improved on room air  Continue antitussives and supportive care  Nebulizer treatment as needed  Urinary tract infection  Urine cultures obtained on admission growing >100,000 CFU of E.coli  Patient has received adequate IV antibiotic treatment for this  Essential hypertension  Blood pressure is well-controlled.  Amlodipine 5 mg with hold parameters  Bipolar disorder (HCC)  Patient was seen at [facility] in November 202, reviewed hospital stay  With history of severe major depressive disorder and generalized anxiety disorder with panic attacks  Continue PTA medication with Abilify 10 mg qd, Zoloft 200 mg qhs, BuSpar 15 mg TID, Minipress 2 mg qhs and trazodone qhs prn, atarax 25 mg tid  Slow transit constipation  Placed on Senokot as with large loose bowel movements, last one 1/8/2025  Hold meds, CT abd pelvis as stated above  Anemia  Patient with average hemoglobin 12-13 in 2023, averaging in the 9's since her surgery in December  No evidence of bleeding on exam  Reviewed EGD and colonoscopy from February 2024  Follow-up iron panel and CBC  GERD  without esophagitis  PPI was increased due to being on steroids  Tardive dyskinesia  Patient is on Ingrezza which is non formulary. Attempted to call family with no answer to bring this in  Follows OP psychiatry and Neurology for this  Being substituted with Cogentin 1 mg twice daily for this  Hypokalemia  Potassium 3.4, suspect in setting of loose stool with bowel regimen and nebulizer treatment  P.o.  Awaiting morning BMP  Chronic pain disorder  Was seen APS previously, medication management as above  Attempting to wean off opioids, suspect potential ileus vs SBO related to narcotics  History of CVA (cerebrovascular accident)  Reviewed OP neuro note 6/2024, continue aspirin and statin    VTE Pharmacologic Prophylaxis: VTE Score: 3 Moderate Risk (Score 3-4) - Pharmacological DVT Prophylaxis Ordered: enoxaparin (Lovenox).    Mobility:   Basic Mobility Inpatient Raw Score: 18  JH-HLM Goal: 6: Walk 10 steps or more  JH-HLM Achieved: 6: Walk 10 steps or more  JH-HLM Goal NOT achieved. Continue with multidisciplinary rounding and encourage appropriate mobility to improve upon JH-HLM goals.    Patient Centered Rounds: I performed bedside rounds with nursing staff today.   Discussions with Specialists or Other Care Team Provider: Orthopedics, general surgery, transfer center, internal medicine physician at [facility]    Education and Discussions with Family / Patient: Updated  (daughter) via phone.    Current Length of Stay: 6 day(s)  Current Patient Status: Inpatient   Certification Statement: The patient will continue to require additional inpatient hospital stay due to wound care, abdominal pain work up  Discharge Plan: TBD    Code Status: Level 1 - Full Code    Subjective   Patient seen examined lying in bed.  She notes she threw up earlier this morning some clear soup that she had yesterday evening.  She still has sharp pain radiating across her upper abdomen but no further vomiting.  She drank some  clear liquids.  Her last loose bowel movement was yesterday.  She states she is still coughing but denies any shortness of breath or chest pain.  Her back pain is currently well-controlled.     Objective :  Temp:  [98.1 °F (36.7 °C)-98.4 °F (36.9 °C)] 98.2 °F (36.8 °C)  HR:  [56-92] 64  BP: (126-165)/(75-99) 126/75  Resp:  [18-20] 18  SpO2:  [97 %-100 %] 97 %  O2 Device: None (Room air)    Body mass index is 38.32 kg/m².     Input and Output Summary (last 24 hours):     Intake/Output Summary (Last 24 hours) at 1/9/2025 0928  Last data filed at 1/8/2025 2216  Gross per 24 hour   Intake 10 ml   Output --   Net 10 ml       Physical Exam  Constitutional:       Appearance: Normal appearance. She is not ill-appearing or diaphoretic.   Cardiovascular:      Rate and Rhythm: Normal rate and regular rhythm.   Pulmonary:      Effort: Pulmonary effort is normal. No respiratory distress.      Breath sounds: No rhonchi or rales.      Comments: Decreased aeration bilateral bases  Abdominal:      Palpations: Abdomen is soft.      Tenderness: There is abdominal tenderness (epigastric). There is no guarding or rebound.      Comments: Normoactive bowel sounds bilateral lower quadrants, hypoactive bowel sounds upper quadrants.  Mildly distended, soft abdomen.   Musculoskeletal:      Right lower leg: No edema.      Left lower leg: No edema.      Comments: Back dressing in place, CDI   Skin:     General: Skin is warm and dry.   Neurological:      General: No focal deficit present.      Mental Status: She is alert. Mental status is at baseline.      Comments: TD noted   Psychiatric:         Mood and Affect: Mood normal.         Lab Results: I have reviewed the following results:   Results from last 7 days   Lab Units 01/08/25  0528 01/04/25  0539 01/03/25  0315 01/02/25  2200   WBC Thousand/uL 4.56   < > 9.50 11.19*   HEMOGLOBIN g/dL 9.2*   < > 9.3* 10.5*   HEMATOCRIT % 29.5*   < > 28.7* 32.7*   PLATELETS Thousands/uL 476*   < > 405* 434*    BANDS PCT %  --   --   --  1   SEGS PCT %  --   --  81*  --    LYMPHO PCT %  --   --  9* 2*   MONO PCT %  --   --  8 7   EOS PCT %  --   --  1 0    < > = values in this interval not displayed.     Results from last 7 days   Lab Units 01/08/25  0528   SODIUM mmol/L 142   POTASSIUM mmol/L 3.4*   CHLORIDE mmol/L 104   CO2 mmol/L 30   BUN mg/dL 8   CREATININE mg/dL 0.66   ANION GAP mmol/L 8   CALCIUM mg/dL 8.2*   GLUCOSE RANDOM mg/dL 91     Results from last 7 days   Lab Units 01/06/25  0720 01/05/25  1611 01/04/25  2110   POC GLUCOSE mg/dl 102 163* 119     Results from last 7 days   Lab Units 01/03/25  0315   LACTIC ACID mmol/L 0.8   PROCALCITONIN ng/ml 0.36*     Recent Cultures (last 7 days):   Results from last 7 days   Lab Units 01/03/25  0626 01/03/25  0316   BLOOD CULTURE   --  No Growth After 5 Days.  No Growth After 5 Days.   URINE CULTURE  >100,000 cfu/ml Escherichia coli*  50,000-59,000 cfu/ml  --      Last 24 Hours Medication List:     Current Facility-Administered Medications:     acetaminophen (TYLENOL) tablet 975 mg, Q8H KESHIA    albuterol (PROVENTIL HFA,VENTOLIN HFA) inhaler 2 puff, Q4H PRN    aluminum-magnesium hydroxide-simethicone (MAALOX) oral suspension 30 mL, Q4H PRN    amLODIPine (NORVASC) tablet 5 mg, Daily    ARIPiprazole (ABILIFY) tablet 10 mg, Daily    aspirin (ECOTRIN LOW STRENGTH) EC tablet 81 mg, Daily    atorvastatin (LIPITOR) tablet 40 mg, Daily With Dinner    benzonatate (TESSALON PERLES) capsule 100 mg, TID    benztropine (COGENTIN) tablet 1 mg, BID    bisacodyl (DULCOLAX) rectal suppository 10 mg, Daily PRN    busPIRone (BUSPAR) tablet 15 mg, TID    ceFAZolin (ANCEF) IVPB (premix in dextrose) 2,000 mg 50 mL, Q8H, Last Rate: 2,000 mg (01/09/25 0528)    dextromethorphan-guaiFENesin (ROBITUSSIN DM) oral syrup 10 mL, TID    dicyclomine (BENTYL) tablet 10 mg, TID PRN    enoxaparin (LOVENOX) subcutaneous injection 40 mg, Daily    hydrOXYzine HCL (ATARAX) tablet 25 mg, TID     ipratropium-albuterol (DUO-NEB) 0.5-2.5 mg/3 mL inhalation solution 3 mL, Q6H PRN    lidocaine (LIDODERM) 5 % patch 1 patch, Daily    lubiprostone (AMITIZA) capsule 8 mcg, BID    ondansetron (ZOFRAN) injection 4 mg, Q6H PRN    oxyCODONE (ROXICODONE) IR tablet 5 mg, Q6H PRN **OR** oxyCODONE (ROXICODONE) immediate release tablet 10 mg, Q6H PRN    pantoprazole (PROTONIX) EC tablet 40 mg, BID AC    pneumococcal 20-cheryl conj vacc (PREVNAR 20) IM Injection 0.5 mL, Once PRN    polyethylene glycol (MIRALAX) packet 17 g, Daily PRN    prazosin (MINIPRESS) capsule 2 mg, HS    predniSONE tablet 40 mg, Daily    pregabalin (LYRICA) capsule 150 mg, TID    senna-docusate sodium (SENOKOT S) 8.6-50 mg per tablet 1 tablet, BID    sertraline (ZOLOFT) tablet 200 mg, HS    sodium chloride 0.9 % infusion, Continuous    traZODone (DESYREL) tablet 300 mg, HS PRN    Administrative Statements   Today, Patient Was Seen By: Priscilla Kan PA-C    **Please Note: This note may have been constructed using a voice recognition system.**

## 2025-01-09 NOTE — PHYSICAL THERAPY NOTE
PHYSICAL THERAPY NOTE          Patient Name: Samantha Rodriguez  Today's Date: 1/9/2025 01/09/25 1152   Note Type   Note Type Treatment   Pain Assessment   Pain Assessment Tool 0-10   Pain Score 10 - Worst Possible Pain   Pain Location/Orientation Location: Abdomen   Hospital Pain Intervention(s) Repositioned;Ambulation/increased activity;Emotional support;Rest   Restrictions/Precautions   Braces or Orthoses LSO   Other Precautions Bed Alarm;Multiple lines;Fall Risk;Pain;1:1;Contact/isolation;Droplet precautions;Spinal precautions  (wound vac)   General   Chart Reviewed Yes   Family/Caregiver Present No   Cognition   Overall Cognitive Status WFL   Arousal/Participation Alert;Responsive;Cooperative   Orientation Level Oriented to person;Oriented to place;Oriented to situation   Memory Decreased recall of precautions   Following Commands Follows one step commands with increased time or repetition   Subjective   Subjective pt  agreeableto PT.  pt  reports abdominal pain and needing to go to the abthroom.   Bed Mobility   Rolling L 5  Supervision   Additional items Assist x 1;Bedrails;Increased time required;Verbal cues;HOB elevated   Supine to Sit 5  Supervision   Additional items Assist x 1;HOB elevated;Bedrails;Increased time required;Verbal cues   Sit to Supine 4  Minimal assistance   Additional items Assist x 1;Increased time required;Verbal cues;LE management;Bedrails   Additional Comments verbal cues for technique   Transfers   Sit to Stand 4  Minimal assistance   Additional items Assist x 1;Increased time required;Verbal cues   Stand to Sit 4  Minimal assistance   Additional items Assist x 1;Armrests;Increased time required;Verbal cues   Toilet transfer 4  Minimal assistance   Additional items Assist x 1;Armrests;Increased time required;Verbal cues;Commode   Car transfer   (stand to sit min,  pt is able to pivot in and around  without assistance.  simulated with staxi chair.)   Additional items Increased time required;Assist x 1;Verbal cues   Additional Comments cuesf or hand placement, safe techniques   Ambulation/Elevation   Gait pattern Forward Flexion;Short stride;Excessively slow   Gait Assistance 4  Minimal assist   Additional items Assist x 1   Assistive Device Rolling walker   Distance 36' x1, 5' x2, 10' x1   Ambulation/Elevation Additional Comments pt  dons and doffs lso with min assist while seated at EOB.   Balance   Static Sitting Good   Dynamic Sitting Fair   Static Standing Fair +  (w rw)   Dynamic Standing Fair  (w rw unilateral ue support)   Ambulatory Poor +   Endurance Deficit   Endurance Deficit Description pain,   Activity Tolerance   Activity Tolerance Patient limited by pain;Patient limited by fatigue;Patient tolerated treatment well   Assessment   Prognosis Fair   Problem List Decreased strength;Decreased endurance;Impaired balance;Decreased mobility;Pain;Orthopedic restrictions;Decreased range of motion;Impaired judgement;Decreased safety awareness   Assessment t seen for PT treatment session this date with interventions consisting of bed mobility, transfer training, and gait training, and education provided as needed for safety and direction to improve functional mobility, safety awareness, and activity tolerance. Pt agreeable to PT treatment session upon arrival, pt found supine in bed . At end of session, pt left  seated out of bed in chair with all needs in reach. In comparison to previous session, pt with improvement in activity tolerance, endurance, and ambulation distances. Pt  is functioning at supervision for supine to sit, min assist x1 for sit to supine, min assist x1 for sit to stand,and supervision for car transfers with verbal cues for technique. Pt  dons and doffs LSO seated at EOB with min assist x1.  Pt performed gait training with use of RW with min assist x1.  Pt  ambulates 36' x1, 5' x2 and 10'  x1.  Verbal cues for improved posture, ue weightbearing techniques,   and to maintain close distance to walker at all times. Pt  requires verbal cues for safe transfer and mobility techniques.  No gross LOB noted.  Pt is limited by decreased activity tolerance,  fatigue, pain.  Pt  transfers on and off BSC with min assist x1.  Pt  performs sona anal hygiene I'ly with min assist x1 for steadying assist with support of RW. Pt remains functioning below baseline with impairments in activity tolerance, endurance, balance, mobility,  overall strength and pain.   Continue to recommend  level II moderate rehab resource intensity  at time of d/c in order to maximize pt's functional independence and safety w/ mobility. Pt continues to be functioning below baseline level. PT will continue to see pt while here in order to address the deficits listed above and provide interventions consistent w/ POC in effort to achieve STGs.   Goals   Patient Goals To be able to walk without an AD.   STG Expiration Date 01/17/25   PT Treatment Day 1   Plan   Treatment/Interventions Functional transfer training;Therapeutic exercise;Endurance training;Patient/family training;Equipment eval/education;Bed mobility;Gait training;Spoke to nursing   Progress Progressing toward goals   PT Frequency 3-5x/wk   Discharge Recommendation   Rehab Resource Intensity Level, PT II (Moderate Resource Intensity)   Additional Comments The patient's AM-PAC Basic Mobility Inpatient Short Form Raw Score is 17. A raw score greater than 16 suggests the patient may benefit from discharge to home. Please also refer to the recommendation of the Physical Therapist for safe discharge planning.   AM-PAC Basic Mobility Inpatient   Turning in Flat Bed Without Bedrails 3   Lying on Back to Sitting on Edge of Flat Bed Without Bedrails 3   Moving Bed to Chair 3   Standing Up From Chair Using Arms 3   Walk in Room 3   Climb 3-5 Stairs With Railing 2   Basic Mobility Inpatient Raw  Score 17   Basic Mobility Standardized Score 39.67   [facility] Highest Level Of Mobility   -[facility] Goal 5: Stand one or more mins   -[facility] Achieved 7: Walk 25 feet or more   Education   Education Provided Mobility training;Assistive device   Patient Reinforcement needed;Demonstrates verbal understanding   End of Consult   Patient Position at End of Consult Other (comment)  (pt  in transport chair, transport to to radiology for CT scan of abdomen/ pelvis.)      01/09/25 1152   Note Type   Note Type Treatment   Pain Assessment   Pain Assessment Tool 0-10   Pain Score 10 - Worst Possible Pain   Pain Location/Orientation Location: Abdomen   Hospital Pain Intervention(s) Repositioned;Ambulation/increased activity;Emotional support;Rest   Restrictions/Precautions   Braces or Orthoses LSO   Other Precautions Bed Alarm;Multiple lines;Fall Risk;Pain;1:1;Contact/isolation;Droplet precautions;Spinal precautions  (wound vac)   General   Chart Reviewed Yes   Family/Caregiver Present No   Cognition   Overall Cognitive Status WFL   Arousal/Participation Alert;Responsive;Cooperative   Orientation Level Oriented to person;Oriented to place;Oriented to situation   Memory Decreased recall of precautions   Following Commands Follows one step commands with increased time or repetition   Subjective   Subjective pt  agreeableto PT.  pt  reports abdominal pain and needing to go to the abthroom.   Bed Mobility   Rolling L 5  Supervision   Additional items Assist x 1;Bedrails;Increased time required;Verbal cues;HOB elevated   Supine to Sit 5  Supervision   Additional items Assist x 1;HOB elevated;Bedrails;Increased time required;Verbal cues   Sit to Supine 4  Minimal assistance   Additional items Assist x 1;Increased time required;Verbal cues;LE management;Bedrails   Additional Comments verbal cues for technique   Transfers   Sit to Stand 4  Minimal assistance   Additional items Assist x 1;Increased time required;Verbal cues   Stand to Sit  4  Minimal assistance   Additional items Assist x 1;Armrests;Increased time required;Verbal cues   Toilet transfer 4  Minimal assistance   Additional items Assist x 1;Armrests;Increased time required;Verbal cues;Commode   Car transfer   (stand to sit min,  pt is able to pivot in and around without assistance.  simulated with staxi chair.)   Additional items Increased time required;Assist x 1;Verbal cues   Additional Comments cuesf or hand placement, safe techniques   Ambulation/Elevation   Gait pattern Forward Flexion;Short stride;Excessively slow   Gait Assistance 4  Minimal assist   Additional items Assist x 1   Assistive Device Rolling walker   Distance 36' x1, 5' x2, 10' x1   Ambulation/Elevation Additional Comments pt  dons and doffs lso with min assist while seated at EOB.   Balance   Static Sitting Good   Dynamic Sitting Fair   Static Standing Fair +  (w rw)   Dynamic Standing Fair  (w rw unilateral ue support)   Ambulatory Poor +   Endurance Deficit   Endurance Deficit Description pain,   Activity Tolerance   Activity Tolerance Patient limited by pain;Patient limited by fatigue;Patient tolerated treatment well   Assessment   Prognosis Fair   Problem List Decreased strength;Decreased endurance;Impaired balance;Decreased mobility;Pain;Orthopedic restrictions;Decreased range of motion;Impaired judgement;Decreased safety awareness   Assessment t seen for PT treatment session this date with interventions consisting of bed mobility, transfer training, and gait training, and education provided as needed for safety and direction to improve functional mobility, safety awareness, and activity tolerance. Pt agreeable to PT treatment session upon arrival, pt found supine in bed . At end of session, pt left  seated out of bed in chair with all needs in reach. In comparison to previous session, pt with improvement in activity tolerance, endurance, and ambulation distances. Pt  is functioning at supervision for supine to  sit, min assist x1 for sit to supine, min assist x1 for sit to stand,and supervision for car transfers with verbal cues for technique. Pt  dons and doffs LSO seated at EOB with min assist x1.  Pt performed gait training with use of RW with min assist x1.  Pt  ambulates 36' x1, 5' x2 and 10' x1.  Verbal cues for improved posture, ue weightbearing techniques,   and to maintain close distance to walker at all times. Pt  requires verbal cues for safe transfer and mobility techniques.  No gross LOB noted.  Pt is limited by decreased activity tolerance,  fatigue, pain.  Pt  transfers on and off BSC with min assist x1.  Pt  performs sona anal hygiene I'ly with min assist x1 for steadying assist with support of RW. Pt remains functioning below baseline with impairments in activity tolerance, endurance, balance, mobility,  overall strength and pain.   Continue to recommend  level II moderate rehab resource intensity  at time of d/c in order to maximize pt's functional independence and safety w/ mobility. Pt continues to be functioning below baseline level. PT will continue to see pt while here in order to address the deficits listed above and provide interventions consistent w/ POC in effort to achieve STGs.   Goals   Patient Goals To be able to walk without an AD.   STG Expiration Date 01/17/25   PT Treatment Day 1   Plan   Treatment/Interventions Functional transfer training;Therapeutic exercise;Endurance training;Patient/family training;Equipment eval/education;Bed mobility;Gait training;Spoke to nursing   Progress Progressing toward goals   PT Frequency 3-5x/wk   Discharge Recommendation   Rehab Resource Intensity Level, PT II (Moderate Resource Intensity)   Additional Comments The patient's AM-PAC Basic Mobility Inpatient Short Form Raw Score is 17. A raw score greater than 16 suggests the patient may benefit from discharge to home. Please also refer to the recommendation of the Physical Therapist for safe discharge  planning.   AM-PAC Basic Mobility Inpatient   Turning in Flat Bed Without Bedrails 3   Lying on Back to Sitting on Edge of Flat Bed Without Bedrails 3   Moving Bed to Chair 3   Standing Up From Chair Using Arms 3   Walk in Room 3   Climb 3-5 Stairs With Railing 2   Basic Mobility Inpatient Raw Score 17   Basic Mobility Standardized Score 39.67   [facility] Highest Level Of Mobility   -HL Goal 5: Stand one or more mins   -HL Achieved 7: Walk 25 feet or more   Education   Education Provided Mobility training;Assistive device   Patient Reinforcement needed;Demonstrates verbal understanding   End of Consult   Patient Position at End of Consult Other (comment)  (pt  in transport chair, transport to to radiology for CT scan of abdomen/ pelvis.)   Julienne Forte, PTA

## 2025-01-09 NOTE — ASSESSMENT & PLAN NOTE
Patient with average hemoglobin 12-13 in 2023, averaging in the 9's since her surgery in December  No evidence of bleeding on exam  Reviewed EGD and colonoscopy from February 2024  Follow-up iron panel and CBC

## 2025-01-09 NOTE — CASE MANAGEMENT
Case Management Discharge Planning Note    Patient name Samantha Rodriguez  Location [facility] 2 /South 2 M* MRN [id number]  : 1965 Date 2025       Current Admission Date: 2025  Current Admission Diagnosis:Ambulatory dysfunction   Patient Active Problem List    Diagnosis Date Noted Date Diagnosed    Wound dehiscence 2025     Urinary tract infection 2025     Wound of sacral region 2025     RSV bronchitis 2025     Acute blood loss anemia 2024     Pain associated with surgical procedure 2024     Toxic encephalopathy 2024     Severe episode of recurrent major depressive disorder, without psychotic features ([facility]) 2024     Chronic low back pain, unspecified back pain laterality, unspecified whether sciatica present 2024     Opioid-induced constipation 2024     Weight gain 2024     Numbness 2023     Memory loss 2023     Hemiplegia, post-stroke ([facility]) 2022     Anemia 2022     Hypokalemia 2022     Slow transit constipation 03/15/2022     CVA (cerebral vascular accident) ([facility]) 2022     Mixed hyperlipidemia 2021     History of CVA (cerebrovascular accident) 2021     Dysphagia 2020     Ambulatory dysfunction 2020     Status post insertion of spinal cord stimulator 2020     SIRS (systemic inflammatory response syndrome) ([facility]) 2020     Tardive dyskinesia 2020     MIMI (obstructive sleep apnea)      Obesity, morbid ([facility]) 2020     Post laminectomy syndrome 10/07/2019     Bipolar I disorder, most recent episode depressed ([facility]) 2019 09/15/2023    Spinal stenosis of lumbar region with neurogenic claudication 2018     Lumbar radiculopathy 2017     Chronic pain disorder 2017     Lumbar spondylosis 10/31/2016     Generalized anxiety disorder with panic attacks 10/26/2016     Bipolar disorder ([facility]) 2015     PTSD (post-traumatic stress disorder)  06/18/2015     Panic disorder without agoraphobia 06/18/2015     Tremor 06/12/2014     Migraine 05/27/2014     GERD without esophagitis 05/01/2014     Cognitive disorder 04/18/2014     Overactive bladder 09/26/2013     Mood insomnia (HCC) 01/31/2013     Urinary incontinence 09/24/2012     Vitamin D deficiency 09/18/2012     Fibromyalgia 09/14/2012     Essential hypertension 09/14/2012       LOS (days): 6  Geometric Mean LOS (GMLOS) (days):   Days to GMLOS:     OBJECTIVE:  Risk of Unplanned Readmission Score: 34.85         Current admission status: Inpatient   Preferred Pharmacy:   [place], [place] - [address] [facility]  [address] [facility] [address]  Phone: [phone] Fax: [phone]    [facility]/pharmacy #[id number] - [place] - [address] [facility]  [address] [facility] [place] [address]  Phone: [phone] Fax: [phone]    [facility] SPECIAL[facility] [place]  [place] - [address]  [address] [place] [address]  Phone: [phone] Fax: [phone]    [facility] [place], [place] - [address] [facility]  [address] [facility] [address]  Phone: [phone] Fax: [phone]    [facility] [place] - [address]  [address]  [place] [address]  Phone: [phone] Fax: [phone]    Primary Care Provider: Glo Valente DO    Primary Insurance: [facility] [employer] REP  Secondary Insurance: [facility]    DISCHARGE DETAILS:                                                                                                               Facility Insurance Auth Number: [id number]

## 2025-01-09 NOTE — ASSESSMENT & PLAN NOTE
Patient is on Ingrezza which is non formulary. Attempted to call family with no answer to bring this in  Follows OP psychiatry and Neurology for this  Being substituted with Cogentin 1 mg twice daily for this

## 2025-01-09 NOTE — ASSESSMENT & PLAN NOTE
Patient reports after getting back from rehab she has been struggling to walk secondary to her back.  PT/OT reconsulted  Typically uses walker

## 2025-01-09 NOTE — CASE MANAGEMENT
Case Management Discharge Planning Note    Patient name Samantha Rodriguez  Location [place] [address] /South 2 M* MRN [id number]  : 1965 Date 2025       Current Admission Date: 2025  Current Admission Diagnosis:Wound dehiscence   Patient Active Problem List    Diagnosis Date Noted Date Diagnosed    Abdominal pain 2025     History of UTI 2025     Volume overload 2025     Wound dehiscence 2025     Urinary tract infection 2025     Wound of sacral region 2025     RSV bronchitis 2025     Acute blood loss anemia 2024     Pain associated with surgical procedure 2024     Toxic encephalopathy 2024     Severe episode of recurrent major depressive disorder, without psychotic features ([facility]) 2024     Chronic low back pain, unspecified back pain laterality, unspecified whether sciatica present 2024     Opioid-induced constipation 2024     Weight gain 2024     Numbness 2023     Memory loss 2023     Hemiplegia, post-stroke ([facility]) 2022     Anemia 2022     Hypokalemia 2022     Slow transit constipation 03/15/2022     CVA (cerebral vascular accident) ([facility]) 2022     Mixed hyperlipidemia 2021     History of CVA (cerebrovascular accident) 2021     Dysphagia 2020     Ambulatory dysfunction 2020     Status post insertion of spinal cord stimulator 2020     SIRS (systemic inflammatory response syndrome) ([facility]) 2020     Tardive dyskinesia 2020     MIMI (obstructive sleep apnea)      Obesity, morbid ([facility]) 2020     Post laminectomy syndrome 10/07/2019     Bipolar I disorder, most recent episode depressed ([facility]) 2019 09/15/2023    Spinal stenosis of lumbar region with neurogenic claudication 2018     Lumbar radiculopathy 2017     Chronic pain disorder 2017     Lumbar spondylosis 10/31/2016     Generalized anxiety disorder with panic attacks  10/26/2016     Bipolar disorder (HCC) 06/18/2015     PTSD (post-traumatic stress disorder) 06/18/2015     Panic disorder without agoraphobia 06/18/2015     Tremor 06/12/2014     Migraine 05/27/2014     GERD without esophagitis 05/01/2014     Cognitive disorder 04/18/2014     Overactive bladder 09/26/2013     Mood insomnia (HCC) 01/31/2013     Urinary incontinence 09/24/2012     Vitamin D deficiency 09/18/2012     Fibromyalgia 09/14/2012     Essential hypertension 09/14/2012       LOS (days): 6  Geometric Mean LOS (GMLOS) (days):   Days to GMLOS:     OBJECTIVE:  Risk of Unplanned Readmission Score: 34.91         Current admission status: Inpatient   Preferred Pharmacy:   [place], [place] - [address] [facility]  [address] [facility] [address]  Phone: [phone] Fax: [phone]    [facility]/pharmacy #[id number] - [place] - [address] [facility]  [address] [facility] [address]  Phone: [phone] Fax: [phone]    [facility] SPECIAL[facility] [place]  [place] - [address]  [address] [facility]  [place] [address]  Phone: [phone] Fax: [phone]    [facility] [place], [place] - [address] [facility]  [address] [facility] [address]  Phone: [phone] Fax: [phone]    [facility] [place] - [place] - [address]  [address]  [place] [address]  Phone: [phone] Fax: [phone]    Primary Care Provider: Glo Valente DO    Primary Insurance: [employer]  REP  Secondary Insurance: [facility]    DISCHARGE DETAILS:    Other Referral/Resources/Interventions Provided:  Interventions: Acute Hospital Transfer  Referral Comments: Acute hospital transfer, patient approval for STR, [facility] [place] [facility].    Treatment Team Recommendation: Acute Hospital Transfer  Discharge Destination Plan:: Acute Hospital Transfer       Additional  Comments: CM completed Medical Necessity and placed in patient file. CM sent follow up message to [facility] to update and confirm that patient has an approved authorization. CM to follow for further discharge needs.

## 2025-01-09 NOTE — PLAN OF CARE
Problem: PHYSICAL THERAPY ADULT  Goal: Performs mobility at highest level of function for planned discharge setting.  See evaluation for individualized goals.  Description: Treatment/Interventions: Functional transfer training, LE strengthening/ROM, Therapeutic exercise, Endurance training, Patient/family training, Bed mobility, Gait training, Spoke to nursing, OT          See flowsheet documentation for full assessment, interventions and recommendations.  Outcome: Progressing  Note: Prognosis: Fair  Problem List: Decreased strength, Decreased endurance, Impaired balance, Decreased mobility, Pain, Orthopedic restrictions, Decreased range of motion, Impaired judgement, Decreased safety awareness  Assessment: t seen for PT treatment session this date with interventions consisting of bed mobility, transfer training, and gait training, and education provided as needed for safety and direction to improve functional mobility, safety awareness, and activity tolerance. Pt agreeable to PT treatment session upon arrival, pt found supine in bed . At end of session, pt left  seated out of bed in chair with all needs in reach. In comparison to previous session, pt with improvement in activity tolerance, endurance, and ambulation distances. Pt  is functioning at supervision for supine to sit, min assist x1 for sit to supine, min assist x1 for sit to stand,and supervision for car transfers with verbal cues for technique. Pt  dons and doffs LSO seated at EOB with min assist x1.  Pt performed gait training with use of RW with min assist x1.  Pt  ambulates 36' x1, 5' x2 and 10' x1.  Verbal cues for improved posture, ue weightbearing techniques,   and to maintain close distance to walker at all times. Pt  requires verbal cues for safe transfer and mobility techniques.  No gross LOB noted.  Pt is limited by decreased activity tolerance,  fatigue, pain.  Pt  transfers on and off BSC with min assist x1.  Pt  performs sona anal hygiene  I'ly with min assist x1 for steadying assist with support of RW. Pt remains functioning below baseline with impairments in activity tolerance, endurance, balance, mobility,  overall strength and pain.   Continue to recommend  level II moderate rehab resource intensity  at time of d/c in order to maximize pt's functional independence and safety w/ mobility. Pt continues to be functioning below baseline level. PT will continue to see pt while here in order to address the deficits listed above and provide interventions consistent w/ POC in effort to achieve STGs.  Barriers to Discharge: None     Rehab Resource Intensity Level, PT: II (Moderate Resource Intensity)    See flowsheet documentation for full assessment.

## 2025-01-09 NOTE — ASSESSMENT & PLAN NOTE
Patient was seen at [facility] in November 202, reviewed hospital stay  With history of severe major depressive disorder and generalized anxiety disorder with panic attacks  Continue PTA medication with Abilify 10 mg qd, Zoloft 200 mg qhs, BuSpar 15 mg TID, Minipress 2 mg qhs and trazodone qhs prn, atarax 25 mg tid

## 2025-01-09 NOTE — PLAN OF CARE
Problem: PAIN - ADULT  Goal: Verbalizes/displays adequate comfort level or baseline comfort level  Description: Interventions:  - Encourage patient to monitor pain and request assistance  - Assess pain using appropriate pain scale  - Administer analgesics based on type and severity of pain and evaluate response  - Implement non-pharmacological measures as appropriate and evaluate response  - Consider cultural and social influences on pain and pain management  - Notify physician/advanced practitioner if interventions unsuccessful or patient reports new pain  Outcome: Progressing     Problem: INFECTION - ADULT  Goal: Absence or prevention of progression during hospitalization  Description: INTERVENTIONS:  - Assess and monitor for signs and symptoms of infection  - Monitor lab/diagnostic results  - Monitor all insertion sites, i.e. indwelling lines, tubes, and drains  - Monitor endotracheal if appropriate and nasal secretions for changes in amount and color  - [place] appropriate cooling/warming therapies per order  - Administer medications as ordered  - Instruct and encourage patient and family to use good hand hygiene technique  - Identify and instruct in appropriate isolation precautions for identified infection/condition  Outcome: Progressing     Problem: SAFETY ADULT  Goal: Patient will remain free of falls  Description: INTERVENTIONS:  - Educate patient/family on patient safety including physical limitations  - Instruct patient to call for assistance with activity   - Consult OT/PT to assist with strengthening/mobility   - Keep Call bell within reach  - Keep bed low and locked with side rails adjusted as appropriate  - Keep care items and personal belongings within reach  - Initiate and maintain comfort rounds  - Make Fall Risk Sign visible to staff  - Offer Toileting every 2 Hours, in advance of need  - Initiate/Maintain bed alarm  - Obtain necessary fall risk management equipment: bed alarms   - Apply  yellow socks and bracelet for high fall risk patients  - Consider moving patient to room near nurses station  Outcome: Progressing  Goal: Maintain or return to baseline ADL function  Description: INTERVENTIONS:  -  Assess patient's ability to carry out ADLs; assess patient's baseline for ADL function and identify physical deficits which impact ability to perform ADLs (bathing, care of mouth/teeth, toileting, grooming, dressing, etc.)  - Assess/evaluate cause of self-care deficits   - Assess range of motion  - Assess patient's mobility; develop plan if impaired  - Assess patient's need for assistive devices and provide as appropriate  - Encourage maximum independence but intervene and supervise when necessary  - Involve family in performance of ADLs  - Assess for home care needs following discharge   - Consider OT consult to assist with ADL evaluation and planning for discharge  - Provide patient education as appropriate  Outcome: Progressing  Goal: Maintains/Returns to pre admission functional level  Description: INTERVENTIONS:  - Perform AM-PAC 6 Click Basic Mobility/ Daily Activity assessment daily.  - Set and communicate daily mobility goal to care team and patient/family/caregiver.   - Collaborate with rehabilitation services on mobility goals if consulted  - Perform Range of Motion 4 times a day.  - Reposition patient every 2 hours.  - Dangle patient 3 times a day  - Stand patient 3 times a day  - Ambulate patient 3 times a day  - Out of bed to chair 3 times a day   - Out of bed for meals 3 times a day  - Out of bed for toileting  - Record patient progress and toleration of activity level   Outcome: Progressing     Problem: DISCHARGE PLANNING  Goal: Discharge to home or other facility with appropriate resources  Description: INTERVENTIONS:  - Identify barriers to discharge w/patient and caregiver  - Arrange for needed discharge resources and transportation as appropriate  - Identify discharge learning needs  (meds, wound care, etc.)  - Arrange for interpretive services to assist at discharge as needed  - Refer to Case Management Department for coordinating discharge planning if the patient needs post-hospital services based on physician/advanced practitioner order or complex needs related to functional status, cognitive ability, or social support system  Outcome: Progressing     Problem: Knowledge Deficit  Goal: Patient/family/caregiver demonstrates understanding of disease process, treatment plan, medications, and discharge instructions  Description: Complete learning assessment and assess knowledge base.  Interventions:  - Provide teaching at level of understanding  - Provide teaching via preferred learning methods  Outcome: Progressing     Problem: SKIN/TISSUE INTEGRITY - ADULT  Goal: Skin Integrity remains intact(Skin Breakdown Prevention)  Description: Assess:  -Perform Ignacio assessment every shift  -Clean and moisturize skin every shift  -Inspect skin when repositioning, toileting, and assisting with ADLS  -Assess under medical devices such as Masimo every shift  -Assess extremities for adequate circulation and sensation     Bed Management:  -Have minimal linens on bed & keep smooth, unwrinkled  -Change linens as needed when moist or perspiring  -Avoid sitting or lying in one position for more than 1 hours while in bed  -Keep HOB at 30 degrees     Toileting:  -Offer bedside commode  -Assess for incontinence every shift  -Use incontinent care products after each incontinent episode such as shield wipes     Activity:  -Mobilize patient 3 times a day  -Encourage activity and walks on unit  -Encourage or provide ROM exercises   -Turn and reposition patient every 2 Hours  -Use appropriate equipment to lift or move patient in bed  -Instruct/ Assist with weight shifting every 1 hour when out of bed in chair  -Consider limitation of chair time 1 hour intervals    Skin Care:  -Avoid use of baby powder, tape, friction and  shearing, hot water or constrictive clothing  -Relieve pressure over bony prominences using wedges   -Do not massage red bony areas    Next Steps:  -Teach patient strategies to minimize risks such as skin breakdown   -Consider consults to  interdisciplinary teams such as wound care nurse   Outcome: Progressing  Goal: Incision(s), wounds(s) or drain site(s) healing without S/S of infection  Description: INTERVENTIONS  - Assess and document dressing, incision, wound bed, drain sites and surrounding tissue  - Provide patient and family education  - Perform skin care/dressing changes every shift  Outcome: Progressing     Problem: HEMATOLOGIC - ADULT  Goal: Maintains hematologic stability  Description: INTERVENTIONS  - Assess for signs and symptoms of bleeding or hemorrhage  - Monitor labs  - Administer supportive blood products/factors as ordered and appropriate  Outcome: Progressing     Problem: MUSCULOSKELETAL - ADULT  Goal: Maintain or return mobility to safest level of function  Description: INTERVENTIONS:  - Assess patient's ability to carry out ADLs; assess patient's baseline for ADL function and identify physical deficits which impact ability to perform ADLs (bathing, care of mouth/teeth, toileting, grooming, dressing, etc.)  - Assess/evaluate cause of self-care deficits   - Assess range of motion  - Assess patient's mobility  - Assess patient's need for assistive devices and provide as appropriate  - Encourage maximum independence but intervene and supervise when necessary  - Involve family in performance of ADLs  - Assess for home care needs following discharge   - Consider OT consult to assist with ADL evaluation and planning for discharge  - Provide patient education as appropriate  Outcome: Progressing  Goal: Maintain proper alignment of affected body part  Description: INTERVENTIONS:  - Support, maintain and protect limb and body alignment  - Provide patient/ family with appropriate education  Outcome:  Progressing     Problem: Prexisting or High Potential for Compromised Skin Integrity  Goal: Skin integrity is maintained or improved  Description: INTERVENTIONS:  - Identify patients at risk for skin breakdown  - Assess and monitor skin integrity  - Assess and monitor nutrition and hydration status  - Monitor labs   - Assess for incontinence   - Turn and reposition patient  - Assist with mobility/ambulation  - Relieve pressure over bony prominences  - Avoid friction and shearing  - Provide appropriate hygiene as needed including keeping skin clean and dry  - Evaluate need for skin moisturizer/barrier cream  - Collaborate with interdisciplinary team   - Patient/family teaching  - Consider wound care consult   Outcome: Progressing

## 2025-01-09 NOTE — ASSESSMENT & PLAN NOTE
Discussed the importance of blood sugar control in the prevention of ocular complications. Patient reports after getting back from rehab she has been struggling to walk secondary to her back pain  She typically ambulates with a walker  PT and OT are recommending rehab

## 2025-01-09 NOTE — PROGRESS NOTES
Progress Note - Orthopedics   Name: Samantha Rodriguez 59 y.o. female I MRN: [id number]  Unit/Bed#: [place] [address] -01 I Date of Admission: 1/2/2025   Date of Service: 1/9/2025 I Hospital Day: 6    Assessment & Plan  Ambulatory dysfunction  3 weeks s/p removal hardware L2-S1, posterior fusion L5-S1, revision instrumentation L2-pelvis, bilateral S2 alar screws with Dr. Bills and Dr. Stevens on 12/19.  Overall improvement.  CRP improved from 231.1 > 224.7 > 198.9 most recently on 1/5/25  Leukocytosis resolved and patient has been afebrile   Also being treated for RSV bronchitis per SLIM  Currently on ancef q8h  We would recommend that she is discharged on oral antibiotics  Prevena vac applied on 1/8  Maintain for a week  Switch to portable pack upon discharge  Patient should remain in-house a minimum of 1 more days so that we can monitor her wound.  WBAT bilateral upper and lower extremities  LSO brace when OOB  PT/OT, recommending level 2 rehab  Pain control, DVT ppx per primary    Orthopedics service will follow.  Patient should remain in-house another 2 days for wound monitoring.    Subjective   59 y.o.female seen and evaluated at bedside.  No new acute overnight events, no new complaints.  Patient said she is doing better compared to yesterday. She feels her pain has improved.    Objective :  Temp:  [98.1 °F (36.7 °C)-98.9 °F (37.2 °C)] 98.9 °F (37.2 °C)  HR:  [56-92] 59  BP: (126-165)/(75-99) 138/80  Resp:  [18-20] 18  SpO2:  [97 %-100 %] 99 %  O2 Device: None (Room air)    Physical Exam  Vitals and nursing note reviewed.   Constitutional:       General: She is not in acute distress.     Appearance: She is well-developed.   HENT:      Head: Normocephalic and atraumatic.   Eyes:      Conjunctiva/sclera: Conjunctivae normal.   Pulmonary:      Effort: Pulmonary effort is normal.   Skin:     General: Skin is warm and dry.      Capillary Refill: Capillary refill takes less than 2 seconds.       Musculoskeletal: Lower  "back  Visible skin is without erythema or ecchymosis.  Prevena vac in place and functioning  TTP over the lower back  Sensation intact over the toes  Extremities WWP      Lab Results: I have reviewed the following results:  Recent Labs     01/07/25  0437 01/08/25  0528   WBC 4.72 4.56   HGB 9.4* 9.2*   HCT 30.6* 29.5*   * 476*   BUN 7 8   CREATININE 0.61 0.66     Blood Culture:    Lab Results   Component Value Date    BLOODCX No Growth After 5 Days. 01/03/2025    BLOODCX No Growth After 5 Days. 01/03/2025     Wound Culture: No results found for: \"WOUNDCULT\"  "

## 2025-01-09 NOTE — ASSESSMENT & PLAN NOTE
3 weeks s/p removal hardware L2-S1, posterior fusion L5-S1, revision instrumentation L2-pelvis, bilateral S2 alar screws with Dr. Bills and Dr. Stevens on 12/19.  Overall improvement.  CRP improved from 231.1 > 224.7 > 198.9 most recently on 1/5/25  Leukocytosis resolved and patient has been afebrile   Also being treated for RSV bronchitis per SLIM  Currently on ancef q8h  We would recommend that she is discharged on oral antibiotics  Prevena vac applied on 1/8  Maintain for a week  Switch to portable pack upon discharge  Patient should remain in-house a minimum of 1 more days so that we can monitor her wound.  WBAT bilateral upper and lower extremities  LSO brace when OOB  PT/OT, recommending level 2 rehab  Pain control, DVT ppx per primary

## 2025-01-09 NOTE — ASSESSMENT & PLAN NOTE
Prior hemoglobin range of 12-13 in 2023 however most recently in the 9s since her surgery in December  Hemoglobin currently stable in the 9s  Monitor

## 2025-01-09 NOTE — ASSESSMENT & PLAN NOTE
Reviewed images with wound care from admission.  Discussed with orthopedist after reviewing quick note who noted small area of dehiscence at the distal aspect of the wound  Orthopedics managing wound, wound VAC in place  Patient is on IV Ancef which has treated her cystitis, this is providing empiric coverage and orthopedics is recommending to continue this  Discussed with Dr. Bills who is recommending transfer to [facility] for OR washout.  Discussed with patient and family who is agreeable.  Fortunately she remains without bacteremia

## 2025-01-09 NOTE — CASE MANAGEMENT
[facility] [place] has received APPROVED authorization.  Insurance: [employer]    Auth obtained via portal.  Authorization received for: SNF  Facility: [facility]   Authorization #: [id number]   Start of Care: 01/08  Next Review Date: 01/10  Submit next review to: H&CC Portal / F#: [phone]     Care Manager notified: Concepcion Banegas     Please reach out to  for updates on any clinical information.

## 2025-01-09 NOTE — QUICK NOTE
Reviewed CT imaging with surgery.  Patient has dilated loops of small bowel suggestive of underlying small bowel obstruction.    She also has fluid seen in the colon suggestive of diarrheal illness, suspect this may be in relation to her increased bowel regimen which has been placed on hold    Surgery recommending NG tube and maintaining n.p.o. status.  Limit aggressive IVF at this time due to volume overload noted on scan with trace bilateral pleural effusions and small ascites.    Will obtain echocardiogram due to hypervolemia seen on scan.    CT scan also notes ill-defined fluid pocket to soft tissue gas and surrounding inflammatory stranding in the midline lower back suspicious for abscess.  Patient is awaiting transfer over to [place] for OR washout and Dr. Bills has been made aware.     Attempted to call dtr with no answer. Spoke with her this AM.

## 2025-01-10 ENCOUNTER — APPOINTMENT (INPATIENT)
Dept: RADIOLOGY | Facility: HOSPITAL | Age: 60
DRG: 857 | End: 2025-01-10
Payer: MEDICARE

## 2025-01-10 ENCOUNTER — APPOINTMENT (INPATIENT)
Dept: NON INVASIVE DIAGNOSTICS | Facility: HOSPITAL | Age: 60
DRG: 857 | End: 2025-01-10
Attending: INTERNAL MEDICINE
Payer: MEDICARE

## 2025-01-10 ENCOUNTER — ANESTHESIA (INPATIENT)
Dept: PERIOP | Facility: HOSPITAL | Age: 60
End: 2025-01-10
Payer: MEDICARE

## 2025-01-10 LAB
ANION GAP SERPL CALCULATED.3IONS-SCNC: 11 MMOL/L (ref 4–13)
AORTIC ROOT: 3 CM
ASCENDING AORTA: 3.3 CM
BSA FOR ECHO PROCEDURE: 1.87 M2
BUN SERPL-MCNC: 13 MG/DL (ref 5–25)
CALCIUM SERPL-MCNC: 8 MG/DL (ref 8.4–10.2)
CHLORIDE SERPL-SCNC: 102 MMOL/L (ref 96–108)
CO2 SERPL-SCNC: 27 MMOL/L (ref 21–32)
CREAT SERPL-MCNC: 0.62 MG/DL (ref 0.6–1.3)
E WAVE DECELERATION TIME: 240 MS
E/A RATIO: 1.62
ERYTHROCYTE [DISTWIDTH] IN BLOOD BY AUTOMATED COUNT: 14.9 % (ref 11.6–15.1)
FRACTIONAL SHORTENING: 34 (ref 28–44)
GFR SERPL CREATININE-BSD FRML MDRD: 97 ML/MIN/1.73SQ M
GLUCOSE SERPL-MCNC: 80 MG/DL (ref 65–140)
GLUCOSE SERPL-MCNC: 88 MG/DL (ref 65–140)
HCT VFR BLD AUTO: 31.3 % (ref 34.8–46.1)
HGB BLD-MCNC: 9.9 G/DL (ref 11.5–15.4)
INTERVENTRICULAR SEPTUM IN DIASTOLE (PARASTERNAL SHORT AXIS VIEW): 0.6 CM
INTERVENTRICULAR SEPTUM: 0.6 CM (ref 0.6–1.1)
LAAS-AP2: 15.9 CM2
LAAS-AP4: 18.9 CM2
LACTATE SERPL-SCNC: 0.5 MMOL/L (ref 0.5–2)
LEFT ATRIUM SIZE: 3.7 CM
LEFT ATRIUM VOLUME (MOD BIPLANE): 50 ML
LEFT ATRIUM VOLUME INDEX (MOD BIPLANE): 26.6 ML/M2
LEFT INTERNAL DIMENSION IN SYSTOLE: 3.3 CM (ref 2.1–4)
LEFT VENTRICULAR INTERNAL DIMENSION IN DIASTOLE: 5 CM (ref 3.5–6)
LEFT VENTRICULAR POSTERIOR WALL IN END DIASTOLE: 0.6 CM
LEFT VENTRICULAR STROKE VOLUME: 76 ML
LV EF US.2D.A4C+ESTIMATED: 65 %
LVSV (TEICH): 76 ML
MAGNESIUM SERPL-MCNC: 1.9 MG/DL (ref 1.9–2.7)
MCH RBC QN AUTO: 27.6 PG (ref 26.8–34.3)
MCHC RBC AUTO-ENTMCNC: 31.6 G/DL (ref 31.4–37.4)
MCV RBC AUTO: 87 FL (ref 82–98)
MV E'TISSUE VEL-SEP: 11 CM/S
MV PEAK A VEL: 0.68 M/S
MV PEAK E VEL: 110 CM/S
MV STENOSIS PRESSURE HALF TIME: 70 MS
MV VALVE AREA P 1/2 METHOD: 3.14
PLATELET # BLD AUTO: 547 THOUSANDS/UL (ref 149–390)
PMV BLD AUTO: 9.2 FL (ref 8.9–12.7)
POTASSIUM SERPL-SCNC: 3.5 MMOL/L (ref 3.5–5.3)
RA PRESSURE ESTIMATED: 3 MMHG
RBC # BLD AUTO: 3.59 MILLION/UL (ref 3.81–5.12)
RIGHT ATRIUM AREA SYSTOLE A4C: 13.5 CM2
RIGHT VENTRICLE ID DIMENSION: 3.7 CM
SL CV LEFT ATRIUM LENGTH A2C: 4.7 CM
SL CV LV EF: 64
SL CV PED ECHO LEFT VENTRICLE DIASTOLIC VOLUME (MOD BIPLANE) 2D: 118 ML
SL CV PED ECHO LEFT VENTRICLE SYSTOLIC VOLUME (MOD BIPLANE) 2D: 43 ML
SODIUM SERPL-SCNC: 140 MMOL/L (ref 135–147)
TRICUSPID ANNULAR PLANE SYSTOLIC EXCURSION: 2.8 CM
WBC # BLD AUTO: 7.14 THOUSAND/UL (ref 4.31–10.16)

## 2025-01-10 PROCEDURE — 99223 1ST HOSP IP/OBS HIGH 75: CPT | Performed by: INTERNAL MEDICINE

## 2025-01-10 PROCEDURE — 80048 BASIC METABOLIC PNL TOTAL CA: CPT | Performed by: INTERNAL MEDICINE

## 2025-01-10 PROCEDURE — 99232 SBSQ HOSP IP/OBS MODERATE 35: CPT | Performed by: NURSE PRACTITIONER

## 2025-01-10 PROCEDURE — 87070 CULTURE OTHR SPECIMN AEROBIC: CPT | Performed by: ORTHOPAEDIC SURGERY

## 2025-01-10 PROCEDURE — 13160 SEC CLSR SURG WND/DEHSN XTN: CPT | Performed by: ORTHOPAEDIC SURGERY

## 2025-01-10 PROCEDURE — 85027 COMPLETE CBC AUTOMATED: CPT | Performed by: INTERNAL MEDICINE

## 2025-01-10 PROCEDURE — 83735 ASSAY OF MAGNESIUM: CPT | Performed by: INTERNAL MEDICINE

## 2025-01-10 PROCEDURE — 0J970ZZ DRAINAGE OF BACK SUBCUTANEOUS TISSUE AND FASCIA, OPEN APPROACH: ICD-10-PCS | Performed by: ORTHOPAEDIC SURGERY

## 2025-01-10 PROCEDURE — 82948 REAGENT STRIP/BLOOD GLUCOSE: CPT

## 2025-01-10 PROCEDURE — 93306 TTE W/DOPPLER COMPLETE: CPT

## 2025-01-10 PROCEDURE — 99222 1ST HOSP IP/OBS MODERATE 55: CPT | Performed by: SURGERY

## 2025-01-10 PROCEDURE — NC001 PR NO CHARGE: Performed by: ORTHOPAEDIC SURGERY

## 2025-01-10 PROCEDURE — 87075 CULTR BACTERIA EXCEPT BLOOD: CPT | Performed by: ORTHOPAEDIC SURGERY

## 2025-01-10 PROCEDURE — 87205 SMEAR GRAM STAIN: CPT | Performed by: ORTHOPAEDIC SURGERY

## 2025-01-10 PROCEDURE — 74018 RADEX ABDOMEN 1 VIEW: CPT

## 2025-01-10 PROCEDURE — 87077 CULTURE AEROBIC IDENTIFY: CPT | Performed by: ORTHOPAEDIC SURGERY

## 2025-01-10 PROCEDURE — 93306 TTE W/DOPPLER COMPLETE: CPT | Performed by: STUDENT IN AN ORGANIZED HEALTH CARE EDUCATION/TRAINING PROGRAM

## 2025-01-10 PROCEDURE — 83605 ASSAY OF LACTIC ACID: CPT | Performed by: STUDENT IN AN ORGANIZED HEALTH CARE EDUCATION/TRAINING PROGRAM

## 2025-01-10 PROCEDURE — 0JB70ZZ EXCISION OF BACK SUBCUTANEOUS TISSUE AND FASCIA, OPEN APPROACH: ICD-10-PCS | Performed by: ORTHOPAEDIC SURGERY

## 2025-01-10 PROCEDURE — 87186 SC STD MICRODIL/AGAR DIL: CPT | Performed by: ORTHOPAEDIC SURGERY

## 2025-01-10 PROCEDURE — 99024 POSTOP FOLLOW-UP VISIT: CPT | Performed by: ORTHOPAEDIC SURGERY

## 2025-01-10 PROCEDURE — 99223 1ST HOSP IP/OBS HIGH 75: CPT

## 2025-01-10 RX ORDER — METHOCARBAMOL 100 MG/ML
1000 INJECTION, SOLUTION INTRAMUSCULAR; INTRAVENOUS EVERY 8 HOURS SCHEDULED
Status: DISCONTINUED | OUTPATIENT
Start: 2025-01-10 | End: 2025-01-11

## 2025-01-10 RX ORDER — PRAZOSIN HYDROCHLORIDE 2 MG/1
2 CAPSULE ORAL
Status: DISCONTINUED | OUTPATIENT
Start: 2025-01-10 | End: 2025-01-22 | Stop reason: HOSPADM

## 2025-01-10 RX ORDER — ALBUTEROL SULFATE 0.83 MG/ML
2.5 SOLUTION RESPIRATORY (INHALATION) ONCE AS NEEDED
Status: DISCONTINUED | OUTPATIENT
Start: 2025-01-10 | End: 2025-01-10 | Stop reason: HOSPADM

## 2025-01-10 RX ORDER — GUAIFENESIN/DEXTROMETHORPHAN 100-10MG/5
10 SYRUP ORAL 3 TIMES DAILY
Status: DISCONTINUED | OUTPATIENT
Start: 2025-01-10 | End: 2025-01-11

## 2025-01-10 RX ORDER — BENZTROPINE MESYLATE 1 MG/1
1 TABLET ORAL 2 TIMES DAILY
Status: DISCONTINUED | OUTPATIENT
Start: 2025-01-10 | End: 2025-01-10

## 2025-01-10 RX ORDER — SODIUM CHLORIDE, SODIUM LACTATE, POTASSIUM CHLORIDE, CALCIUM CHLORIDE 600; 310; 30; 20 MG/100ML; MG/100ML; MG/100ML; MG/100ML
INJECTION, SOLUTION INTRAVENOUS CONTINUOUS PRN
Status: DISCONTINUED | OUTPATIENT
Start: 2025-01-10 | End: 2025-01-10

## 2025-01-10 RX ORDER — SODIUM CHLORIDE, SODIUM LACTATE, POTASSIUM CHLORIDE, CALCIUM CHLORIDE 600; 310; 30; 20 MG/100ML; MG/100ML; MG/100ML; MG/100ML
75 INJECTION, SOLUTION INTRAVENOUS CONTINUOUS
Status: DISCONTINUED | OUTPATIENT
Start: 2025-01-10 | End: 2025-01-10

## 2025-01-10 RX ORDER — HYDROMORPHONE HCL/PF 1 MG/ML
0.5 SYRINGE (ML) INJECTION EVERY 2 HOUR PRN
Status: DISCONTINUED | OUTPATIENT
Start: 2025-01-10 | End: 2025-01-11

## 2025-01-10 RX ORDER — DEXAMETHASONE SODIUM PHOSPHATE 10 MG/ML
INJECTION, SOLUTION INTRAMUSCULAR; INTRAVENOUS AS NEEDED
Status: DISCONTINUED | OUTPATIENT
Start: 2025-01-10 | End: 2025-01-10

## 2025-01-10 RX ORDER — ENOXAPARIN SODIUM 100 MG/ML
40 INJECTION SUBCUTANEOUS DAILY
Status: DISCONTINUED | OUTPATIENT
Start: 2025-01-10 | End: 2025-01-22 | Stop reason: HOSPADM

## 2025-01-10 RX ORDER — BENZONATATE 100 MG/1
100 CAPSULE ORAL 3 TIMES DAILY
Status: DISCONTINUED | OUTPATIENT
Start: 2025-01-10 | End: 2025-01-11

## 2025-01-10 RX ORDER — LUBIPROSTONE 8 UG/1
8 CAPSULE ORAL 2 TIMES DAILY
Status: DISCONTINUED | OUTPATIENT
Start: 2025-01-10 | End: 2025-01-22 | Stop reason: HOSPADM

## 2025-01-10 RX ORDER — HYDROXYZINE HYDROCHLORIDE 25 MG/1
25 TABLET, FILM COATED ORAL 3 TIMES DAILY
Status: DISCONTINUED | OUTPATIENT
Start: 2025-01-10 | End: 2025-01-22 | Stop reason: HOSPADM

## 2025-01-10 RX ORDER — ARIPIPRAZOLE 10 MG/1
10 TABLET ORAL DAILY
Status: DISCONTINUED | OUTPATIENT
Start: 2025-01-10 | End: 2025-01-22 | Stop reason: HOSPADM

## 2025-01-10 RX ORDER — PREGABALIN 75 MG/1
150 CAPSULE ORAL 3 TIMES DAILY
Status: DISCONTINUED | OUTPATIENT
Start: 2025-01-10 | End: 2025-01-22 | Stop reason: HOSPADM

## 2025-01-10 RX ORDER — IPRATROPIUM BROMIDE AND ALBUTEROL SULFATE 2.5; .5 MG/3ML; MG/3ML
SOLUTION RESPIRATORY (INHALATION) AS NEEDED
Status: DISCONTINUED | OUTPATIENT
Start: 2025-01-10 | End: 2025-01-10

## 2025-01-10 RX ORDER — CEFAZOLIN SODIUM 2 G/50ML
2000 SOLUTION INTRAVENOUS EVERY 8 HOURS
Status: DISCONTINUED | OUTPATIENT
Start: 2025-01-10 | End: 2025-01-12

## 2025-01-10 RX ORDER — POLYETHYLENE GLYCOL 3350 17 G/17G
17 POWDER, FOR SOLUTION ORAL DAILY PRN
Status: DISCONTINUED | OUTPATIENT
Start: 2025-01-10 | End: 2025-01-11

## 2025-01-10 RX ORDER — IPRATROPIUM BROMIDE AND ALBUTEROL SULFATE 2.5; .5 MG/3ML; MG/3ML
3 SOLUTION RESPIRATORY (INHALATION) EVERY 6 HOURS PRN
Status: DISCONTINUED | OUTPATIENT
Start: 2025-01-10 | End: 2025-01-10

## 2025-01-10 RX ORDER — ASPIRIN 81 MG/1
81 TABLET ORAL DAILY
Status: DISCONTINUED | OUTPATIENT
Start: 2025-01-10 | End: 2025-01-22 | Stop reason: HOSPADM

## 2025-01-10 RX ORDER — TRAZODONE HYDROCHLORIDE 100 MG/1
300 TABLET ORAL
Status: DISCONTINUED | OUTPATIENT
Start: 2025-01-10 | End: 2025-01-22 | Stop reason: HOSPADM

## 2025-01-10 RX ORDER — FENTANYL CITRATE 50 UG/ML
INJECTION, SOLUTION INTRAMUSCULAR; INTRAVENOUS AS NEEDED
Status: DISCONTINUED | OUTPATIENT
Start: 2025-01-10 | End: 2025-01-10

## 2025-01-10 RX ORDER — ACETAMINOPHEN 10 MG/ML
1000 INJECTION, SOLUTION INTRAVENOUS EVERY 8 HOURS PRN
Status: DISCONTINUED | OUTPATIENT
Start: 2025-01-10 | End: 2025-01-10

## 2025-01-10 RX ORDER — SODIUM CHLORIDE 9 MG/ML
75 INJECTION, SOLUTION INTRAVENOUS CONTINUOUS
Status: DISCONTINUED | OUTPATIENT
Start: 2025-01-10 | End: 2025-01-11

## 2025-01-10 RX ORDER — CEFAZOLIN SODIUM 2 G/50ML
SOLUTION INTRAVENOUS AS NEEDED
Status: DISCONTINUED | OUTPATIENT
Start: 2025-01-10 | End: 2025-01-10

## 2025-01-10 RX ORDER — ACETAMINOPHEN 10 MG/ML
1000 INJECTION, SOLUTION INTRAVENOUS EVERY 8 HOURS
Status: DISCONTINUED | OUTPATIENT
Start: 2025-01-10 | End: 2025-01-11

## 2025-01-10 RX ORDER — ACETAMINOPHEN 325 MG/1
975 TABLET ORAL EVERY 8 HOURS SCHEDULED
Status: DISCONTINUED | OUTPATIENT
Start: 2025-01-10 | End: 2025-01-10

## 2025-01-10 RX ORDER — ONDANSETRON 2 MG/ML
4 INJECTION INTRAMUSCULAR; INTRAVENOUS EVERY 6 HOURS PRN
Status: DISCONTINUED | OUTPATIENT
Start: 2025-01-10 | End: 2025-01-22 | Stop reason: HOSPADM

## 2025-01-10 RX ORDER — ALBUTEROL SULFATE 90 UG/1
2 INHALANT RESPIRATORY (INHALATION) EVERY 4 HOURS PRN
Status: DISCONTINUED | OUTPATIENT
Start: 2025-01-10 | End: 2025-01-22 | Stop reason: HOSPADM

## 2025-01-10 RX ORDER — PANTOPRAZOLE SODIUM 40 MG/1
40 TABLET, DELAYED RELEASE ORAL
Status: DISCONTINUED | OUTPATIENT
Start: 2025-01-10 | End: 2025-01-15

## 2025-01-10 RX ORDER — PROPOFOL 10 MG/ML
INJECTION, EMULSION INTRAVENOUS AS NEEDED
Status: DISCONTINUED | OUTPATIENT
Start: 2025-01-10 | End: 2025-01-10

## 2025-01-10 RX ORDER — AMOXICILLIN 250 MG
1 CAPSULE ORAL 2 TIMES DAILY
Status: DISCONTINUED | OUTPATIENT
Start: 2025-01-10 | End: 2025-01-11

## 2025-01-10 RX ORDER — ROCURONIUM BROMIDE 10 MG/ML
INJECTION, SOLUTION INTRAVENOUS AS NEEDED
Status: DISCONTINUED | OUTPATIENT
Start: 2025-01-10 | End: 2025-01-10

## 2025-01-10 RX ORDER — AMLODIPINE BESYLATE 5 MG/1
5 TABLET ORAL DAILY
Status: DISCONTINUED | OUTPATIENT
Start: 2025-01-10 | End: 2025-01-17

## 2025-01-10 RX ORDER — ONDANSETRON 2 MG/ML
INJECTION INTRAMUSCULAR; INTRAVENOUS AS NEEDED
Status: DISCONTINUED | OUTPATIENT
Start: 2025-01-10 | End: 2025-01-10

## 2025-01-10 RX ORDER — LIDOCAINE 50 MG/G
1 PATCH TOPICAL DAILY
Status: DISCONTINUED | OUTPATIENT
Start: 2025-01-10 | End: 2025-01-22 | Stop reason: HOSPADM

## 2025-01-10 RX ORDER — BENZTROPINE MESYLATE 1 MG/1
1 TABLET ORAL 2 TIMES DAILY
Status: DISCONTINUED | OUTPATIENT
Start: 2025-01-10 | End: 2025-01-22 | Stop reason: HOSPADM

## 2025-01-10 RX ORDER — MIDAZOLAM HYDROCHLORIDE 2 MG/2ML
INJECTION, SOLUTION INTRAMUSCULAR; INTRAVENOUS AS NEEDED
Status: DISCONTINUED | OUTPATIENT
Start: 2025-01-10 | End: 2025-01-10

## 2025-01-10 RX ORDER — MAGNESIUM HYDROXIDE/ALUMINUM HYDROXICE/SIMETHICONE 120; 1200; 1200 MG/30ML; MG/30ML; MG/30ML
30 SUSPENSION ORAL EVERY 4 HOURS PRN
Status: DISCONTINUED | OUTPATIENT
Start: 2025-01-10 | End: 2025-01-22 | Stop reason: HOSPADM

## 2025-01-10 RX ORDER — SUCCINYLCHOLINE/SOD CL,ISO/PF 100 MG/5ML
SYRINGE (ML) INTRAVENOUS AS NEEDED
Status: DISCONTINUED | OUTPATIENT
Start: 2025-01-10 | End: 2025-01-10

## 2025-01-10 RX ORDER — BISACODYL 10 MG
10 SUPPOSITORY, RECTAL RECTAL DAILY PRN
Status: DISCONTINUED | OUTPATIENT
Start: 2025-01-10 | End: 2025-01-11

## 2025-01-10 RX ORDER — LIDOCAINE HYDROCHLORIDE 10 MG/ML
INJECTION, SOLUTION EPIDURAL; INFILTRATION; INTRACAUDAL; PERINEURAL AS NEEDED
Status: DISCONTINUED | OUTPATIENT
Start: 2025-01-10 | End: 2025-01-10

## 2025-01-10 RX ORDER — FENTANYL CITRATE/PF 50 MCG/ML
25 SYRINGE (ML) INJECTION
Status: COMPLETED | OUTPATIENT
Start: 2025-01-10 | End: 2025-01-10

## 2025-01-10 RX ORDER — SERTRALINE HYDROCHLORIDE 100 MG/1
200 TABLET, FILM COATED ORAL
Status: DISCONTINUED | OUTPATIENT
Start: 2025-01-10 | End: 2025-01-22 | Stop reason: HOSPADM

## 2025-01-10 RX ORDER — ONDANSETRON 2 MG/ML
4 INJECTION INTRAMUSCULAR; INTRAVENOUS ONCE AS NEEDED
Status: DISCONTINUED | OUTPATIENT
Start: 2025-01-10 | End: 2025-01-10 | Stop reason: HOSPADM

## 2025-01-10 RX ORDER — ATORVASTATIN CALCIUM 40 MG/1
40 TABLET, FILM COATED ORAL
Status: DISCONTINUED | OUTPATIENT
Start: 2025-01-10 | End: 2025-01-22 | Stop reason: HOSPADM

## 2025-01-10 RX ORDER — VANCOMYCIN HYDROCHLORIDE 1 G/20ML
INJECTION, POWDER, LYOPHILIZED, FOR SOLUTION INTRAVENOUS AS NEEDED
Status: DISCONTINUED | OUTPATIENT
Start: 2025-01-10 | End: 2025-01-10 | Stop reason: HOSPADM

## 2025-01-10 RX ORDER — OXYCODONE HYDROCHLORIDE 5 MG/1
5 TABLET ORAL EVERY 6 HOURS PRN
Refills: 0 | Status: DISCONTINUED | OUTPATIENT
Start: 2025-01-10 | End: 2025-01-10

## 2025-01-10 RX ORDER — MAGNESIUM HYDROXIDE 1200 MG/15ML
LIQUID ORAL AS NEEDED
Status: DISCONTINUED | OUTPATIENT
Start: 2025-01-10 | End: 2025-01-10 | Stop reason: HOSPADM

## 2025-01-10 RX ORDER — HYDROMORPHONE HCL/PF 1 MG/ML
SYRINGE (ML) INJECTION AS NEEDED
Status: DISCONTINUED | OUTPATIENT
Start: 2025-01-10 | End: 2025-01-10

## 2025-01-10 RX ADMIN — Medication 1 SPRAY: at 03:36

## 2025-01-10 RX ADMIN — SODIUM CHLORIDE, SODIUM LACTATE, POTASSIUM CHLORIDE, AND CALCIUM CHLORIDE: .6; .31; .03; .02 INJECTION, SOLUTION INTRAVENOUS at 11:37

## 2025-01-10 RX ADMIN — MIDAZOLAM 2 MG: 1 INJECTION INTRAMUSCULAR; INTRAVENOUS at 11:36

## 2025-01-10 RX ADMIN — Medication 1 SPRAY: at 01:09

## 2025-01-10 RX ADMIN — ACETAMINOPHEN 1000 MG: 10 INJECTION INTRAVENOUS at 23:46

## 2025-01-10 RX ADMIN — TRAZODONE HYDROCHLORIDE 300 MG: 100 TABLET ORAL at 22:25

## 2025-01-10 RX ADMIN — HYDROMORPHONE HYDROCHLORIDE 0.5 MG: 1 INJECTION, SOLUTION INTRAMUSCULAR; INTRAVENOUS; SUBCUTANEOUS at 13:39

## 2025-01-10 RX ADMIN — BENZTROPINE MESYLATE 1 MG: 1 TABLET ORAL at 15:41

## 2025-01-10 RX ADMIN — LIDOCAINE HYDROCHLORIDE 100 MG: 10 INJECTION, SOLUTION EPIDURAL; INFILTRATION; INTRACAUDAL; PERINEURAL at 11:48

## 2025-01-10 RX ADMIN — FENTANYL CITRATE 25 MCG: 50 INJECTION INTRAMUSCULAR; INTRAVENOUS at 12:02

## 2025-01-10 RX ADMIN — DEXAMETHASONE SODIUM PHOSPHATE 10 MG: 10 INJECTION, SOLUTION INTRAMUSCULAR; INTRAVENOUS at 11:48

## 2025-01-10 RX ADMIN — FENTANYL CITRATE 25 MCG: 50 INJECTION INTRAMUSCULAR; INTRAVENOUS at 14:15

## 2025-01-10 RX ADMIN — ONDANSETRON 4 MG: 2 INJECTION INTRAMUSCULAR; INTRAVENOUS at 13:13

## 2025-01-10 RX ADMIN — METHOCARBAMOL 1000 MG: 100 INJECTION INTRAMUSCULAR; INTRAVENOUS at 17:54

## 2025-01-10 RX ADMIN — HYDROMORPHONE HYDROCHLORIDE 0.5 MG: 1 INJECTION, SOLUTION INTRAMUSCULAR; INTRAVENOUS; SUBCUTANEOUS at 15:26

## 2025-01-10 RX ADMIN — BUSPIRONE HYDROCHLORIDE 15 MG: 10 TABLET ORAL at 22:21

## 2025-01-10 RX ADMIN — SODIUM CHLORIDE 75 ML/HR: 0.9 INJECTION, SOLUTION INTRAVENOUS at 17:59

## 2025-01-10 RX ADMIN — FENTANYL CITRATE 25 MCG: 50 INJECTION INTRAMUSCULAR; INTRAVENOUS at 11:36

## 2025-01-10 RX ADMIN — HYDROMORPHONE HYDROCHLORIDE 0.5 MG: 1 INJECTION, SOLUTION INTRAMUSCULAR; INTRAVENOUS; SUBCUTANEOUS at 03:32

## 2025-01-10 RX ADMIN — SERTRALINE HYDROCHLORIDE 200 MG: 100 TABLET ORAL at 22:21

## 2025-01-10 RX ADMIN — IPRATROPIUM BROMIDE AND ALBUTEROL SULFATE 3 ML: 2.5; .5 SOLUTION RESPIRATORY (INHALATION) at 11:41

## 2025-01-10 RX ADMIN — ONDANSETRON 4 MG: 2 INJECTION INTRAMUSCULAR; INTRAVENOUS at 19:58

## 2025-01-10 RX ADMIN — HYDROMORPHONE HYDROCHLORIDE 0.5 MG: 1 INJECTION, SOLUTION INTRAMUSCULAR; INTRAVENOUS; SUBCUTANEOUS at 19:58

## 2025-01-10 RX ADMIN — PRAZOSIN HYDROCHLORIDE 2 MG: 2 CAPSULE ORAL at 22:23

## 2025-01-10 RX ADMIN — CEFAZOLIN SODIUM 2000 MG: 2 SOLUTION INTRAVENOUS at 05:31

## 2025-01-10 RX ADMIN — METHOCARBAMOL 1000 MG: 100 INJECTION INTRAMUSCULAR; INTRAVENOUS at 22:23

## 2025-01-10 RX ADMIN — FENTANYL CITRATE 25 MCG: 50 INJECTION INTRAMUSCULAR; INTRAVENOUS at 12:54

## 2025-01-10 RX ADMIN — ROCURONIUM BROMIDE 15 MG: 10 INJECTION, SOLUTION INTRAVENOUS at 12:54

## 2025-01-10 RX ADMIN — HYDROXYZINE HYDROCHLORIDE 25 MG: 25 TABLET, FILM COATED ORAL at 22:21

## 2025-01-10 RX ADMIN — GUAIFENESIN AND DEXTROMETHORPHAN 10 ML: 100; 10 SYRUP ORAL at 22:20

## 2025-01-10 RX ADMIN — SUGAMMADEX 200 MG: 100 INJECTION, SOLUTION INTRAVENOUS at 13:28

## 2025-01-10 RX ADMIN — FENTANYL CITRATE 25 MCG: 50 INJECTION INTRAMUSCULAR; INTRAVENOUS at 14:20

## 2025-01-10 RX ADMIN — PROPOFOL 150 MG: 10 INJECTION, EMULSION INTRAVENOUS at 11:48

## 2025-01-10 RX ADMIN — ACETAMINOPHEN 1000 MG: 10 INJECTION INTRAVENOUS at 16:46

## 2025-01-10 RX ADMIN — FENTANYL CITRATE 25 MCG: 50 INJECTION INTRAMUSCULAR; INTRAVENOUS at 14:10

## 2025-01-10 RX ADMIN — PREGABALIN 150 MG: 75 CAPSULE ORAL at 22:21

## 2025-01-10 RX ADMIN — HYDROMORPHONE HYDROCHLORIDE 0.5 MG: 1 INJECTION, SOLUTION INTRAMUSCULAR; INTRAVENOUS; SUBCUTANEOUS at 23:49

## 2025-01-10 RX ADMIN — Medication 1 SPRAY: at 08:00

## 2025-01-10 RX ADMIN — HYDROMORPHONE HYDROCHLORIDE 0.5 MG: 1 INJECTION, SOLUTION INTRAMUSCULAR; INTRAVENOUS; SUBCUTANEOUS at 01:10

## 2025-01-10 RX ADMIN — Medication 1 SPRAY: at 15:49

## 2025-01-10 RX ADMIN — FENTANYL CITRATE 25 MCG: 50 INJECTION INTRAMUSCULAR; INTRAVENOUS at 14:25

## 2025-01-10 RX ADMIN — FENTANYL CITRATE 25 MCG: 50 INJECTION INTRAMUSCULAR; INTRAVENOUS at 11:48

## 2025-01-10 RX ADMIN — BENZONATATE 100 MG: 100 CAPSULE ORAL at 22:21

## 2025-01-10 RX ADMIN — CEFAZOLIN SODIUM 2000 MG: 2 SOLUTION INTRAVENOUS at 17:11

## 2025-01-10 RX ADMIN — HYDROMORPHONE HYDROCHLORIDE 0.5 MG: 1 INJECTION, SOLUTION INTRAMUSCULAR; INTRAVENOUS; SUBCUTANEOUS at 05:36

## 2025-01-10 RX ADMIN — Medication 100 MG: at 11:48

## 2025-01-10 RX ADMIN — CEFAZOLIN SODIUM 2000 MG: 2 SOLUTION INTRAVENOUS at 11:51

## 2025-01-10 NOTE — ASSESSMENT & PLAN NOTE
Patient is on Ingrezza which is nonformulary, Substitute with Cogentin 1 mg twice daily for now  Will have family bring it to be given if able   Outpatient follow-up with psychiatry and neurology

## 2025-01-10 NOTE — ASSESSMENT & PLAN NOTE
Patient was seen at [facility] in November 202, reviewed hospital stay  With history of severe major depressive disorder and generalized anxiety disorder with panic attacks  Continue PTA medication with Abilify 10 mg qd, Zoloft 200 mg qhs, BuSpar 15 mg TID, Minipress 2 mg qhs and trazodone qhs prn, atarax 25 mg tid

## 2025-01-10 NOTE — ASSESSMENT & PLAN NOTE
"Per gen surg: \"Patient with CT scan concerning for SBO, however given patient's presentation this is more likely an ileus. Patient has begun having bowel function, and has had complete resolution of her symptoms. \"    NGT remains in place to suction and patient remains NPO.  "

## 2025-01-10 NOTE — ASSESSMENT & PLAN NOTE
S/p removal hardware L2-S1, posterior fusion L5-S1, revision instrumentation L2-pelvis, bilateral S2 alar screws with orthopedic surgery on 12/19. Now dehiscence and concern for abscess as above.  Monitor neurochecks  Weightbearing as tolerated  Plan for OR as above

## 2025-01-10 NOTE — ASSESSMENT & PLAN NOTE
Samantha is a 58 y/o F presenting 4 weeks s/p  weeks s/p removal hardware L2-S1, posterior fusion L5-S1, revision instrumentation L2-pelvis, bilateral S2 alar screws with Dr. Bills and Dr. Stevens (DOS 12/19/24) with concern for wound dehiscence. Plan for OR today for I&D lumbar spine. Consent for surgery obtained. NPO for OR.  WBAT all extremities  NPO for OR  Consent for surgery obtained  Multimodal pain control  PT/OT  Antibiotics
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
## 2025-01-10 NOTE — ANESTHESIA POSTPROCEDURE EVALUATION
Post-Op Assessment Note    CV Status:  Stable  Pain Score: 0    Pain management: adequate       Mental Status:  Awake and sleepy   Hydration Status:  Stable   PONV Controlled:  None   Airway Patency:  Patent     Post Op Vitals Reviewed: Yes    No anethesia notable event occurred.    Staff: Anesthesiologist           Last Filed PACU Vitals:  Vitals Value Taken Time   Temp 98.6 °F (37 °C) 01/10/25 1445   Pulse 61 01/10/25 1507   /56 01/10/25 1500   Resp 45 01/10/25 1506   SpO2 97 % 01/10/25 1507   Vitals shown include unfiled device data.    Modified Gala:     Vitals Value Taken Time   Activity 2 01/10/25 1445   Respiration 2 01/10/25 1445   Circulation 2 01/10/25 1445   Consciousness 2 01/10/25 1445   Oxygen Saturation 2 01/10/25 1445     Modified Gala Score: 10

## 2025-01-10 NOTE — ASSESSMENT & PLAN NOTE
Initially presented to [facility] 1/2 with ambulatory dysfunction.  Recently discharged from rehab following recent back surgery in December 2024.  On admission to [place], patient met criteria and was diagnosed with UTI and RSV.  Patient also noted to have small area of wound dehiscence at the distal aspect of the wound from recent surgical hardware removal.  CT was ordered which showed ill-defined fluid with pockets of soft tissue gas and surrounding inflammatory stranding in the midline lower back suspicious for an abscess therefore patient was transferred to [place] for orthopedic evaluation.  Recent blood cultures 1/3 negative  Continue IV Ancef for now  Plan for OR for washout with ortho  Rest of plan as below

## 2025-01-10 NOTE — ASSESSMENT & PLAN NOTE
Noted to have worsening epigastric sharp pain that started 1/8 overnight with an episode of vomiting  XR abdomen shows severely dilated loops concerning for SBO or ileus  CT A/P shows dilated loops of small bowel suggestive of underlying SBO with transition point in the central abdomen where there is slight swirling of the mesenteric vessel and underlying internal hernia cannot be fully excluded.  Fluid seen in the colon suggestive of diarrheal illness without colon wall thickening to suggest colitis.  Cholelithiasis.  Small ascites.  Previously noted neurostimulator leads have been removed in the interim, does appear to have small retained fragment in the posterior lower back.  Ill-defined fluid in the pocket of the soft tissue gas and surrounding inflammatory stranding in the midline low back suspicious for abscess.  General surgery following, now with + bowel function. Okay to keep NGT in place to suction for now and cleared to proceed with ortho intervention per gen surg.   Continue IVF  PRN pain/nausea control

## 2025-01-10 NOTE — ANESTHESIA POSTPROCEDURE EVALUATION
Post-Op Assessment Note    CV Status:  Stable    Pain management: satisfactory to patient       Mental Status:  Awake and sleepy   Hydration Status:  Euvolemic   PONV Controlled:  Controlled   Airway Patency:  Patent     Post Op Vitals Reviewed: Yes    No anethesia notable event occurred.    Staff: Anesthesiologist, CRNA           Last Filed PACU Vitals:  Vitals Value Taken Time   Temp 98.4 °F (36.9 °C) 01/10/25 1400   Pulse 57 01/10/25 1401   /57 01/10/25 1400   Resp 22 01/10/25 1401   SpO2 100 % 01/10/25 1401   Vitals shown include unfiled device data.

## 2025-01-10 NOTE — ASSESSMENT & PLAN NOTE
Patient reports after getting back from rehab she has been struggling to walk secondary to her back.  PT/OT evaluations pending   Typically uses walker

## 2025-01-10 NOTE — PLAN OF CARE
Problem: PAIN - ADULT  Goal: Verbalizes/displays adequate comfort level or baseline comfort level  Description: Interventions:  - Encourage patient to monitor pain and request assistance  - Assess pain using appropriate pain scale  - Administer analgesics based on type and severity of pain and evaluate response  - Implement non-pharmacological measures as appropriate and evaluate response  - Consider cultural and social influences on pain and pain management  - Notify physician/advanced practitioner if interventions unsuccessful or patient reports new pain  Outcome: Progressing     Problem: INFECTION - ADULT  Goal: Absence or prevention of progression during hospitalization  Description: INTERVENTIONS:  - Assess and monitor for signs and symptoms of infection  - Monitor lab/diagnostic results  - Monitor all insertion sites, i.e. indwelling lines, tubes, and drains  - Monitor endotracheal if appropriate and nasal secretions for changes in amount and color  - [place] appropriate cooling/warming therapies per order  - Administer medications as ordered  - Instruct and encourage patient and family to use good hand hygiene technique  - Identify and instruct in appropriate isolation precautions for identified infection/condition  Outcome: Progressing     Problem: SAFETY ADULT  Goal: Patient will remain free of falls  Description: INTERVENTIONS:  - Educate patient/family on patient safety including physical limitations  - Instruct patient to call for assistance with activity   - Consult OT/PT to assist with strengthening/mobility   - Keep Call bell within reach  - Keep bed low and locked with side rails adjusted as appropriate  - Keep care items and personal belongings within reach  - Initiate and maintain comfort rounds  - Make Fall Risk Sign visible to staff  - Offer Toileting every 2 Hours, in advance of need  - Initiate/Maintain bed alarm  - Obtain necessary fall risk management equipment: bed alarms   - Apply  yellow socks and bracelet for high fall risk patients  - Consider moving patient to room near nurses station  Outcome: Progressing  Goal: Maintain or return to baseline ADL function  Description: INTERVENTIONS:  -  Assess patient's ability to carry out ADLs; assess patient's baseline for ADL function and identify physical deficits which impact ability to perform ADLs (bathing, care of mouth/teeth, toileting, grooming, dressing, etc.)  - Assess/evaluate cause of self-care deficits   - Assess range of motion  - Assess patient's mobility; develop plan if impaired  - Assess patient's need for assistive devices and provide as appropriate  - Encourage maximum independence but intervene and supervise when necessary  - Involve family in performance of ADLs  - Assess for home care needs following discharge   - Consider OT consult to assist with ADL evaluation and planning for discharge  - Provide patient education as appropriate  Outcome: Progressing  Goal: Maintains/Returns to pre admission functional level  Description: INTERVENTIONS:  - Perform AM-PAC 6 Click Basic Mobility/ Daily Activity assessment daily.  - Set and communicate daily mobility goal to care team and patient/family/caregiver.   - Collaborate with rehabilitation services on mobility goals if consulted  - Perform Range of Motion 4 times a day.  - Reposition patient every 2 hours.  - Dangle patient 3 times a day  - Stand patient 3 times a day  - Ambulate patient 3 times a day  - Out of bed to chair 3 times a day   - Out of bed for meals 3 times a day  - Out of bed for toileting  - Record patient progress and toleration of activity level   Outcome: Progressing     Problem: DISCHARGE PLANNING  Goal: Discharge to home or other facility with appropriate resources  Description: INTERVENTIONS:  - Identify barriers to discharge w/patient and caregiver  - Arrange for needed discharge resources and transportation as appropriate  - Identify discharge learning needs  (meds, wound care, etc.)  - Arrange for interpretive services to assist at discharge as needed  - Refer to Case Management Department for coordinating discharge planning if the patient needs post-hospital services based on physician/advanced practitioner order or complex needs related to functional status, cognitive ability, or social support system  Outcome: Progressing     Problem: Knowledge Deficit  Goal: Patient/family/caregiver demonstrates understanding of disease process, treatment plan, medications, and discharge instructions  Description: Complete learning assessment and assess knowledge base.  Interventions:  - Provide teaching at level of understanding  - Provide teaching via preferred learning methods  Outcome: Progressing     Problem: SKIN/TISSUE INTEGRITY - ADULT  Goal: Skin Integrity remains intact(Skin Breakdown Prevention)  Description: Assess:  -Perform Ignacio assessment every shift  -Clean and moisturize skin every shift  -Inspect skin when repositioning, toileting, and assisting with ADLS  -Assess under medical devices such as Masimo every shift  -Assess extremities for adequate circulation and sensation     Bed Management:  -Have minimal linens on bed & keep smooth, unwrinkled  -Change linens as needed when moist or perspiring  -Avoid sitting or lying in one position for more than 1 hours while in bed  -Keep HOB at 30 degrees     Toileting:  -Offer bedside commode  -Assess for incontinence every shift  -Use incontinent care products after each incontinent episode such as shield wipes     Activity:  -Mobilize patient 3 times a day  -Encourage activity and walks on unit  -Encourage or provide ROM exercises   -Turn and reposition patient every 2 Hours  -Use appropriate equipment to lift or move patient in bed  -Instruct/ Assist with weight shifting every 1 hour when out of bed in chair  -Consider limitation of chair time 1 hour intervals    Skin Care:  -Avoid use of baby powder, tape, friction and  shearing, hot water or constrictive clothing  -Relieve pressure over bony prominences using wedges   -Do not massage red bony areas    Next Steps:  -Teach patient strategies to minimize risks such as skin breakdown   -Consider consults to  interdisciplinary teams such as wound care nurse   Outcome: Progressing  Goal: Incision(s), wounds(s) or drain site(s) healing without S/S of infection  Description: INTERVENTIONS  - Assess and document dressing, incision, wound bed, drain sites and surrounding tissue  - Provide patient and family education  - Perform skin care/dressing changes every shift  Outcome: Progressing     Problem: HEMATOLOGIC - ADULT  Goal: Maintains hematologic stability  Description: INTERVENTIONS  - Assess for signs and symptoms of bleeding or hemorrhage  - Monitor labs  - Administer supportive blood products/factors as ordered and appropriate  Outcome: Progressing     Problem: MUSCULOSKELETAL - ADULT  Goal: Maintain or return mobility to safest level of function  Description: INTERVENTIONS:  - Assess patient's ability to carry out ADLs; assess patient's baseline for ADL function and identify physical deficits which impact ability to perform ADLs (bathing, care of mouth/teeth, toileting, grooming, dressing, etc.)  - Assess/evaluate cause of self-care deficits   - Assess range of motion  - Assess patient's mobility  - Assess patient's need for assistive devices and provide as appropriate  - Encourage maximum independence but intervene and supervise when necessary  - Involve family in performance of ADLs  - Assess for home care needs following discharge   - Consider OT consult to assist with ADL evaluation and planning for discharge  - Provide patient education as appropriate  Outcome: Progressing  Goal: Maintain proper alignment of affected body part  Description: INTERVENTIONS:  - Support, maintain and protect limb and body alignment  - Provide patient/ family with appropriate education  Outcome:  Progressing     Problem: Prexisting or High Potential for Compromised Skin Integrity  Goal: Skin integrity is maintained or improved  Description: INTERVENTIONS:  - Identify patients at risk for skin breakdown  - Assess and monitor skin integrity  - Assess and monitor nutrition and hydration status  - Monitor labs   - Assess for incontinence   - Turn and reposition patient  - Assist with mobility/ambulation  - Relieve pressure over bony prominences  - Avoid friction and shearing  - Provide appropriate hygiene as needed including keeping skin clean and dry  - Evaluate need for skin moisturizer/barrier cream  - Collaborate with interdisciplinary team   - Patient/family teaching  - Consider wound care consult   Outcome: Progressing

## 2025-01-10 NOTE — RESPIRATORY THERAPY NOTE
RT Protocol Note  Samantha Rodriguez 59 y.o. female MRN: [id number]  Unit/Bed#: [facility] 615-01 Encounter: [id number]    Assessment    Principal Problem:    Wound dehiscence  Active Problems:    Chronic pain disorder    Lumbar radiculopathy    Bipolar disorder (HCC)    GERD without esophagitis    Essential hypertension    Tardive dyskinesia    Ambulatory dysfunction    History of CVA (cerebrovascular accident)    Anemia    RSV bronchitis    Abdominal pain    History of UTI      Home Pulmonary Medications:  none       Past Medical History:   Diagnosis Date    Anxiety     Arthritis     left knee    Arthritis of right knee 10/06/2020    At risk for falls     Bipolar 2 disorder (HCC)     FOLLOWS WITH PSYCHIATRIST. CONTINUE LAMOTRIGINE; RESOLVED: [id number]    Depression     Dysphagia     per [facility] facility paperwork    Familial tremor     both hands    Fibromyalgia     LAST ASSESSED: [id number]    GERD (gastroesophageal reflux disease)     Hearing aid worn     left ear    [place] (hard of hearing)     left ear    Hyperlipidemia     Hypertension     Impaired speech     Left-sided weakness     Lumbar disc disease with radiculopathy 02/02/2018    Memory loss of unknown cause     long and short term    Migraine     Multiple closed fractures of metatarsal bone of right foot 05/02/2021    Obesity     Obesity, Class II, BMI 35-39.9     Osteoarthritis of both hips 10/31/2016    Osteoarthritis of knee 02/20/2013    Description: Continue Tylenol and Naproxen. Encourage exercise and weight loss. Patient refused physical therapy. I will refer the patient back to Orthopedics.    Overactive bladder     Panic attack     Patellofemoral disorder of both knees 05/01/2020    Post traumatic stress disorder     Primary localized osteoarthritis of both knees 06/16/2017    Primary osteoarthritis of both knees 05/01/2020    S/P insertion of spinal cord stimulator     no remote    S/P total knee arthroplasty, right 03/10/2022    Sacroiliitis (HCC) 02/28/2017     Seasonal allergies     Seizure-like activity (HCC) 06/03/2022    Seizures (HCC)     possible seizure like activity    Small bowel obstruction (HCC) 03/24/2023    Status post total knee replacement, left 07/05/2022    Stroke ([facility])     questionable stroke 2009    Tardive dyskinesia     PATIENT STATES    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  CONT ASA 81 MG DAILY; RESOLVED: [id number]    Urinary incontinence     Uses walker     Wears dentures     partial lower / full upper- doesnt wear    Wears glasses      Social History     Socioeconomic History    Marital status:      Spouse name: Not on file    Number of children: 2    Years of education: graduate school     Highest education level: Not on file   Occupational History    Occupation: Disabled   Tobacco Use    Smoking status: Never    Smokeless tobacco: Never   Vaping Use    Vaping status: Never Used   Substance and Sexual Activity    Alcohol use: Not Currently     Comment: 2 x year; being a social drinker as per all scripts     Drug use: No    Sexual activity: Not Currently   Other Topics Concern    Not on file   Social History Narrative    Bereavement    Daily caffeine consumption, 6-8 servings per day     as per all scripts    Lives in [place]      Social Drivers of Health     Financial Resource Strain: Low Risk  (11/19/2024)    Overall Financial Resource Strain (CARDIA)     Difficulty of Paying Living Expenses: Not very hard   Food Insecurity: No Food Insecurity (1/3/2025)    Nursing - Inadequate Food Risk Classification     Worried About Running Out of Food in the Last Year: Never true     Ran Out of Food in the Last Year: Never true     Ran Out of Food in the Last Year: Never true   Transportation Needs: No Transportation Needs (1/3/2025)    Nursing - Transportation Risk Classification     Lack of Transportation: Not on file     Lack of Transportation: No   Physical Activity: Insufficiently Active (2/19/2024)     Exercise Vital Sign     Days of Exercise per Week: 5 days     Minutes of Exercise per Session: 20 min   Stress: No Stress Concern Present (2024)    [employer] - Occupational Stress Questionnaire     Feeling of Stress : Not at all   Social Connections: Unknown (2024)    Received from [employer]     How often do you feel lonely or isolated from those around you? (Adult - for ages 18 years and over): Not on file   Intimate Partner Violence: Unknown (1/3/2025)    Nursing IPS     Feels Physically and Emotionally Safe: Not on file     Physically Hurt by Someone: Not on file     Humiliated or Emotionally Abused by Someone: Not on file     Physically Hurt by Someone: No     Hurt or Threatened by Someone: No   Housing Stability: Unknown (1/3/2025)    Nursing: Inadequate Housing Risk Classification     Has Housing: Not on file     Worried About Losing Housing: Not on file     Unable to Get Utilities: Not on file     Unable to Pay for Housing in the Last Year: No     Has Housin       Subjective         Objective    Physical Exam:   Assessment Type: Assess only  General Appearance: Sleeping  Respiratory Pattern: Normal  Chest Assessment: Chest expansion symmetrical  Bilateral Breath Sounds: Coarse    Vitals:  Blood pressure 146/92, pulse 68, temperature 98.3 °F (36.8 °C), resp. rate 20, SpO2 96%, not currently breastfeeding.          Imaging and other studies: Results Review Statement: No pertinent imaging studies reviewed.          Plan    Respiratory Plan: No distress/Pulmonary history, Discontinue Protocol        Resp Comments: pt is a transfer from outside facility for wound washout. According to pt chart she takes no home pulm meds. Pt has RSV with coarse bbs, but has a strong cough. Will d/c protocol at this time.

## 2025-01-10 NOTE — ASSESSMENT & PLAN NOTE
"Daughter notes day PTA she threw up prior to coming to the emergency room   Has been having mid epigastric sharp pain worsened that started overnight  Had 1 episode of vomiting this AM after some clear soup last PM  X-ray abdomen with several dilated loops with questionable SBO or ileus.  Patient did have large loose BM yesterday and some sips of water this morning.   No significant distention on exam.  Some hypoactive bowel sounds but lower quadrants with normal active bowel sounds  CT abdomen pelvis with contrast demonstrating \"Dilated loops of small bowel as described above suggestive of underlying small bowel obstruction. The transition point appears to be in the central abdomen where there is slight swirling of the mesenteric vessels. An underlying internal hernia cannot be fully excluded. Fluid seen in the colon suggestive of diarrheal illness without colonic wall thickening to suggest colitis.\"  Nursing to hold oral meds this morning, NPO and start IVF  Surgery consulted  NGT placed  "

## 2025-01-10 NOTE — ASSESSMENT & PLAN NOTE
"Reviewed images with wound care from admission.  Discussed with orthopedist after reviewing quick note who noted small area of dehiscence at the distal aspect of the wound  Orthopedics managing wound, wound VAC in place  Patient is on IV Ancef which has treated her cystitis, this is providing empiric coverage and orthopedics is recommending to continue this  CT a/p demonstrating \" Ill-defined fluid with pockets of soft tissue gas and surrounding inflammatory stranding in the midline lower back suspicious for abscess.\"  Discussed with Dr. Bills who is recommending transfer to [facility] for OR washout.  Discussed with patient and family who is agreeable.  Fortunately she remains without bacteremia  "

## 2025-01-10 NOTE — ASSESSMENT & PLAN NOTE
Patient reports after getting back from rehab she has been struggling to walk secondary to her back pain  She typically ambulates with a walker  PT and OT are recommending rehab

## 2025-01-10 NOTE — CONSULTS
Consultation - Orthopedics   Name: Samantha Rodriguez 59 y.o. female I MRN: [id number]  Unit[facility] 615-[facility] I Date of Admission: 1/9/2025   Date of Service: 1/10/2025 I Hospital Day: 1   Inpatient consult to Orthopedic Surgery  Consult performed by: Kristal Dawson MD  Consult ordered by: Eleuterio Cleveland DO        Physician Requesting Evaluation: Melanie Whiting MD   Reason for Evaluation / Principal Problem: S/p lumbar spine surgery with wound dehiscence    Assessment & Plan  Wound dehiscence  Samantha is a 58 y/o F presenting 4 weeks s/p  weeks s/p removal hardware L2-S1, posterior fusion L5-S1, revision instrumentation L2-pelvis, bilateral S2 alar screws with Dr. Bills and Dr. Stevens (DOS 12/19/24) with concern for wound dehiscence. Plan for OR today for I&D lumbar spine. Consent for surgery obtained. NPO for OR.  WBAT all extremities  NPO for OR  Consent for surgery obtained  Multimodal pain control  PT/OT  Antibiotics  Lumbar radiculopathy  See above      History of Present Illness   HPI: Samantha Rodriguez is a 59 y.o. year old female who presents 4 weeks s/p removal hardware L2-S1, posterior fusion L5-S1, revision instrumentation L2-pelvis, bilateral S2 alar screws with Dr. Bills and Dr. Stevens (DOS 12/19/24). She initially presented to [facility] 1 week ago for post-operative pain and ambulatory dysfunction, as well as elevated inflammatory markers and RSV. She was evaluated by orthopedics and there was concern for post-operative wound issues and a provena vac was placed to her surgical incision. Today, decision was made to proceed with transfer to [facility] for surgical washout given her persistent wound dehiscence. She has been on standing ancef. Today she was also found to have a small bowel obstruction, being treated with NG tube.    Review of Systems significant for findings described in the HPI.  I have reviewed the patient's PMH, PSH, Social History, Family History, Meds, and  "Allergies    Objective :  Temp:  [98.2 °F (36.8 °C)-99.3 °F (37.4 °C)] 98.3 °F (36.8 °C)  HR:  [59-68] 68  BP: (126-146)/(75-94) 146/92  Resp:  [18] 18  SpO2:  [96 %-99 %] 96 %  O2 Device: None (Room air)  Physical Exam  HENT:      Head: Normocephalic.      Right Ear: External ear normal.      Left Ear: External ear normal.   Cardiovascular:      Rate and Rhythm: Normal rate.      Pulses: Normal pulses.   Skin:     Capillary Refill: Capillary refill takes less than 2 seconds.   Neurological:      General: No focal deficit present.      Mental Status: She is alert and oriented to person, place, and time.   Psychiatric:         Mood and Affect: Mood normal.         Thought Content: Thought content normal.     Ortho Exam Spine  Provena in place over incision  SILT L2-S1  TTP about lumbar spine  Toes WWP  Lower Extremity Motor Function    Right  Left    Iliopsoas  4/5  4/5    Quadriceps 4/5 4/5   Tibialis anterior  3/5  4/5    EHL  2/5  4/5    Gastroc. muscle  3/5  4/5            Lab Results: I have reviewed the following results:   Recent Labs     01/07/25  0437 01/08/25  0528 01/09/25  1933   WBC 4.72 4.56 7.05   HGB 9.4* 9.2* 11.1*   HCT 30.6* 29.5* 35.0   * 476* 568*   BUN 7 8  --    CREATININE 0.61 0.66  --      Blood Culture:   Lab Results   Component Value Date    BLOODCX No Growth After 5 Days. 01/03/2025    BLOODCX No Growth After 5 Days. 01/03/2025     Wound Culture: No results found for: \"WOUNDCULT\"    Imaging Results Review: I personally reviewed the following image studies/reports in PACS and discussed pertinent findings with Radiology:   CT Lumbar spine (1/2/25) - no fracture or dislocation, no osteolysis, no hardware failure  Other Study Results Review: No additional pertinent studies reviewed.  "

## 2025-01-10 NOTE — PLAN OF CARE
Problem: PAIN - ADULT  Goal: Verbalizes/displays adequate comfort level or baseline comfort level  Description: Interventions:  - Encourage patient to monitor pain and request assistance  - Assess pain using appropriate pain scale  - Administer analgesics based on type and severity of pain and evaluate response  - Implement non-pharmacological measures as appropriate and evaluate response  - Consider cultural and social influences on pain and pain management  - Notify physician/advanced practitioner if interventions unsuccessful or patient reports new pain  Outcome: Progressing     Problem: INFECTION - ADULT  Goal: Absence or prevention of progression during hospitalization  Description: INTERVENTIONS:  - Assess and monitor for signs and symptoms of infection  - Monitor lab/diagnostic results  - Monitor all insertion sites, i.e. indwelling lines, tubes, and drains  - Monitor endotracheal if appropriate and nasal secretions for changes in amount and color  - [place] appropriate cooling/warming therapies per order  - Administer medications as ordered  - Instruct and encourage patient and family to use good hand hygiene technique  - Identify and instruct in appropriate isolation precautions for identified infection/condition  Outcome: Progressing     Problem: SAFETY ADULT  Goal: Maintain or return to baseline ADL function  Description: INTERVENTIONS:  -  Assess patient's ability to carry out ADLs; assess patient's baseline for ADL function and identify physical deficits which impact ability to perform ADLs (bathing, care of mouth/teeth, toileting, grooming, dressing, etc.)  - Assess/evaluate cause of self-care deficits   - Assess range of motion  - Assess patient's mobility; develop plan if impaired  - Assess patient's need for assistive devices and provide as appropriate  - Encourage maximum independence but intervene and supervise when necessary  - Involve family in performance of ADLs  - Assess for home care  needs following discharge   - Consider OT consult to assist with ADL evaluation and planning for discharge  - Provide patient education as appropriate  Outcome: Progressing     Problem: DISCHARGE PLANNING  Goal: Discharge to home or other facility with appropriate resources  Description: INTERVENTIONS:  - Identify barriers to discharge w/patient and caregiver  - Arrange for needed discharge resources and transportation as appropriate  - Identify discharge learning needs (meds, wound care, etc.)  - Arrange for interpretive services to assist at discharge as needed  - Refer to Case Management Department for coordinating discharge planning if the patient needs post-hospital services based on physician/advanced practitioner order or complex needs related to functional status, cognitive ability, or social support system  Outcome: Progressing     Problem: Knowledge Deficit  Goal: Patient/family/caregiver demonstrates understanding of disease process, treatment plan, medications, and discharge instructions  Description: Complete learning assessment and assess knowledge base.  Interventions:  - Provide teaching at level of understanding  - Provide teaching via preferred learning methods  Outcome: Progressing     Problem: GASTROINTESTINAL - ADULT  Goal: Minimal or absence of nausea and/or vomiting  Description: INTERVENTIONS:  - Administer IV fluids if ordered to ensure adequate hydration  - Maintain NPO status until nausea and vomiting are resolved  - Nasogastric tube if ordered  - Administer ordered antiemetic medications as needed  - Provide nonpharmacologic comfort measures as appropriate  - Advance diet as tolerated, if ordered  - Consider nutrition services referral to assist patient with adequate nutrition and appropriate food choices  Outcome: Progressing  Goal: Maintains or returns to baseline bowel function  Description: INTERVENTIONS:  - Assess bowel function  - Encourage oral fluids to ensure adequate  hydration  - Administer IV fluids if ordered to ensure adequate hydration  - Administer ordered medications as needed  - Encourage mobilization and activity  - Consider nutritional services referral to assist patient with adequate nutrition and appropriate food choices  Outcome: Progressing     Problem: GENITOURINARY - ADULT  Goal: Maintains or returns to baseline urinary function  Description: INTERVENTIONS:  - Assess urinary function  - Encourage oral fluids to ensure adequate hydration if ordered  - Administer IV fluids as ordered to ensure adequate hydration  - Administer ordered medications as needed  - Offer frequent toileting  - Follow urinary retention protocol if ordered  Outcome: Progressing     Problem: SKIN/TISSUE INTEGRITY - ADULT  Goal: Incision(s), wounds(s) or drain site(s) healing without S/S of infection  Description: INTERVENTIONS  - Assess and document dressing, incision, wound bed, drain sites and surrounding tissue  - Provide patient and family education  - Perform skin care/dressing changes every shift   Outcome: Progressing

## 2025-01-10 NOTE — H&P
H&P - Hospitalist   Name: Samantha Rodriguez 59 y.o. female I MRN: [id number]  Unit/Bed#: [facility] 615-01 I Date of Admission: 1/9/2025   Date of Service: 1/10/2025 I Hospital Day: 1     Assessment & Plan  Wound dehiscence  Noted to have small area of wound dehiscence at the distal aspect of the wound from recent surgical hardware removal  Currently on empiric IV Ancef per orthopedic recommendations  Transferred to [facility] for OR washout  1/3 blood cultures negative  Abdominal pain  Noted to have worsening epigastric sharp pain that started 1/8 overnight with an episode of vomiting  XR abdomen shows severely dilated loops concerning for SBO or ileus  CT A/P shows dilated loops of small bowel suggestive of underlying SBO with transition point in the central abdomen where there is slight swirling of the mesenteric vessel and underlying internal hernia cannot be fully excluded.  Fluid seen in the colon suggestive of diarrheal illness without colon wall thickening to suggest colitis.  Cholelithiasis.  Small ascites.  Previously noted neurostimulator leads have been removed in the interim mother does appear to have small retained fragment in the posterior lower back.  Ill-defined fluid in the pocket of the soft tissue gas and surrounding inflammatory stranding in the midline low back suspicious for abscess.  Surgery and orthopedic surgery consulted  Continue IVF  NGT placed at [facility], maintain for now  NPO   Chronic pain disorder  Was seen by APS previously, medication management as above  Attempting to wean off opioids, suspect potential use versus SBO related to narcotics  Lumbar radiculopathy  S/p removal hardware L2-S1, posterior fusion L5-S1, revision instrumentation L2-pelvis, bilateral S2 alar screws with orthopedic surgery on 12/19  Patient continues to have pain limiting her ambulation without any red flag symptoms  Orthopedic surgery following  Continue LSO brace with OOB  Continue pain medications  Bipolar  disorder (HCC)  History of severe MDD and AMAURY with panic attacks  Continue PTA medications with Abilify, Zoloft, BuSpar, Minipress, trazodone and Atarax  GERD without esophagitis  Continue PPI  Essential hypertension  BP stable  Continue amlodipine and prazosin  Tardive dyskinesia  Patient is on Ingrezza which is nonformulary  Will have family bring it to be given  Outpatient follow-up with psychiatry and neurology  Substitute with Cogentin 1 mg twice daily for now  Ambulatory dysfunction  Patient reports after getting back from rehab she has been struggling to walk secondary to her back.  PT/OT reconsulted  Typically uses walker  History of CVA (cerebrovascular accident)  Continue aspirin and statin  Anemia  Prior hemoglobin range of 12-13 in 2023 however most recently in the 9s since her surgery in December  Hemoglobin currently stable in the 9s  Monitor  RSV bronchitis  1/3 RSV positive  Chest x-ray without acute findings  Completed 5 days of steroid therapy  Supportive care  History of UTI  Treated with 3 days of IV Ancef for E. coli prior to transfer      VTE Prophylaxis: Enoxaparin (Lovenox)  / sequential compression device   Code Status: Level 1 - Full Code   POLST: POLST form is not discussed and not completed at this time.    Anticipated Length of Stay:  Patient will be admitted on an Inpatient basis with an anticipated length of stay of  greater than 2 midnights.   Justification for Hospital Stay: Please see detailed plans noted above.    Chief Complaint:     Wound dehiscence, abdominal pain  History of Present Illness:  Samantha Rodriguez is a 59 y.o. female who was initially admitted to the [facility] 1/3/2025 - 1/9/2025 initially with ambulatory dysfunction, of note with recent hardware removal and L5-S1 fusion 12/19/2024 in the setting of lumbar radiculopathy.  Initially this was suspected secondary to the known lumbar radiculopathy in the setting of RSV infection managed supportively,  additionally seen by orthopedic surgery where concern for wound breakdown/superficial infection were noted for which IV antibiotics were initiated and VAC was placed.  Initially nonoperative management was advised however dizziness persisted and patient ultimately was advised to be transferred here for orthopedic spine surgery intervention.  Her hospitalization was complicated by development of abdominal pain with CT imaging revealing findings concerning for small bowel obstruction as described below for which general surgery was consulted and advised conservative management with IV fluids/NGT/pain control.    Currently, patient is lying in bed in no acute distress however notes ongoing low back pain currently 8/10 and is requesting analgesia for this.  Does note some ongoing abdominal pain but improved since placement of NG tube, although patient has not yet passed flatus.    Review of Systems:    Constitutional:  Denies fever or chills   Eyes:  Denies change in visual acuity   HENT:  Denies nasal congestion or sore throat   Respiratory:  Denies cough or shortness of breath   Cardiovascular:  Denies chest pain or edema   GI:  Denies nausea, vomiting, bloody stools or diarrhea but reports abdominal pain  :  Denies dysuria   Musculoskeletal:  Denies joint pain but reports back pain  Integument:  Denies rash   Neurologic:  Denies headache, focal weakness or sensory changes   Endocrine:  Denies polyuria or polydipsia   Lymphatic:  Denies swollen glands   Psychiatric:  Denies depression or anxiety     Past Medical and Surgical History:   Past Medical History:   Diagnosis Date    Anxiety     Arthritis     left knee    Arthritis of right knee 10/06/2020    At risk for falls     Bipolar 2 disorder (HCC)     FOLLOWS WITH PSYCHIATRIST. CONTINUE LAMOTRIGINE; RESOLVED: [id number]    Depression     Dysphagia     per [facility] facility paperwork    Familial tremor     both hands    Fibromyalgia     LAST ASSESSED: [id number]    GERD  (gastroesophageal reflux disease)     Hearing aid worn     left ear    [place] (hard of hearing)     left ear    Hyperlipidemia     Hypertension     Impaired speech     Left-sided weakness     Lumbar disc disease with radiculopathy 2018    Memory loss of unknown cause     long and short term    Migraine     Multiple closed fractures of metatarsal bone of right foot 2021    Obesity     Obesity, Class II, BMI 35-39.9     Osteoarthritis of both hips 10/31/2016    Osteoarthritis of knee 2013    Description: Continue Tylenol and Naproxen. Encourage exercise and weight loss. Patient refused physical therapy. I will refer the patient back to Orthopedics.    Overactive bladder     Panic attack     Patellofemoral disorder of both knees 2020    Post traumatic stress disorder     Primary localized osteoarthritis of both knees 2017    Primary osteoarthritis of both knees 2020    S/P insertion of spinal cord stimulator     no remote    S/P total knee arthroplasty, right 03/10/2022    Sacroiliitis (HCC) 2017    Seasonal allergies     Seizure-like activity (HCC) 2022    Seizures (HCC)     possible seizure like activity    Small bowel obstruction (HCC) 2023    Status post total knee replacement, left 2022    Stroke (HCC)     questionable stroke 2009    Tardive dyskinesia     PATIENT STATES    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  CONT ASA 81 MG DAILY; RESOLVED: [id number]    Urinary incontinence     Uses walker     Wears dentures     partial lower / full upper- doesnt wear    Wears glasses      Past Surgical History:   Procedure Laterality Date    BACK SURGERY       SECTION      COLONOSCOPY      RESOLVED: 2016    EAR SURGERY      EGD      HYSTERECTOMY      LUMBAR FUSION N/A 2024    Procedure: Removal hardware L2-S1, posterior fusion L5-S1, navigated revision instrumentation L2 to pelvis; application of bilateral S2 alar screws  and bone grafting of the disc at L5-S1;  Surgeon: Yenni Stevens MD;  Location: [facility];  Service: Orthopedics    MYRINGOTOMY W/ TUBES Left     NECK SURGERY  04/2019    HI ARTHRP KNE CONDYLE&PLATU MEDIAL&LAT COMPARTMENTS Right 03/09/2022    Procedure: ARTHROPLASTY KNEE TOTAL;  Surgeon: Chandni Tuttle DO;  Location: [facility];  Service: Orthopedics    HI ARTHRP KNE CONDYLE&PLATU MEDIAL&LAT COMPARTMENTS Left 07/05/2022    Procedure: ARTHROPLASTY KNEE TOTAL;  Surgeon: Chandni Tuttle DO;  [facility];  Service: Orthopedics    HI CYSTOURETHROSCOPY N/A 02/18/2016    Procedure: CYSTOSCOPY, BOTOX INJECTION;  Surgeon: Abraham Teague MD;  Location: [facility];  Service: Gynecology    HI INSJ/RPLCMT SPINAL NPG/RCVR POCKET CRTJ&CONNJ Right 02/10/2021    Procedure: REPLACEMENT IMPLANTABLE PULSE GENERATOR DORSAL SPINAL COLUMN STIMULATOR, RIGHT;  Surgeon: Carlos Alberto Jacome MD;  Location: [facility];  Service: Neurosurgery    HI PRQ IMPLTJ NSTIM ELECTRODE ARRAY EPIDURAL Right 07/28/2020    Procedure: INSERTION THORACIC DORSAL COLUMN SPINAL CORD STIMULATOR PERCUTANEOUS W IMPLANTABLE PULSE GENERATOR, RIGHT;  Surgeon: Carlos Alberto Jacome MD;  Location: [facility];  Service: Neurosurgery    TONSILLECTOMY      TUBAL LIGATION  1986    UPPER GASTROINTESTINAL ENDOSCOPY  09/2020       Meds/Allergies:    Medications Prior to Admission:     acetaminophen (TYLENOL) 325 mg tablet    aluminum-magnesium hydroxide-simethicone (MAALOX) 1608-0474-712 mg/30 mL suspension    amLODIPine (NORVASC) 5 mg tablet    ARIPiprazole (ABILIFY) 10 mg tablet    aspirin (Aspirin Low Dose) 81 mg EC tablet    atorvastatin (LIPITOR) 40 mg tablet    bisacodyl (DULCOLAX) 10 mg suppository    busPIRone (BUSPAR) 15 mg tablet    Cholecalciferol (VITAMIN D3) 1,000 units tablet    cyclobenzaprine (FLEXERIL) 10 mg tablet    Diclofenac Sodium (VOLTAREN) 1 %    hydrOXYzine HCL (ATARAX) 25 mg tablet    loratadine (CLARITIN) 10 mg tablet    lubiprostone  (AMITIZA) 8 mcg capsule    magnesium Oxide (MAG-OX) 400 mg TABS    morphine (MSIR) 15 mg tablet    morphine (MSIR) 15 mg tablet    naloxone (NARCAN) 4 mg/0.1 mL nasal spray    ondansetron (ZOFRAN) 4 mg tablet    pantoprazole (PROTONIX) 40 mg tablet    prazosin (MINIPRESS) 2 mg capsule    pregabalin (LYRICA) 150 mg capsule    senna-docusate sodium (SENOKOT S) 8.6-50 mg per tablet    sertraline (ZOLOFT) 100 mg tablet    sodium phosphate (PEDIA-LAX) 3.5-9.5 g 59 mL enema    tirzepatide (Zepbound) 2.5 mg/0.5 mL auto-injector    traZODone (DESYREL) 300 MG tablet    Valbenazine Tosylate (Ingrezza) 60 MG CAPS    Allergies: No Known Allergies  History:  Marital Status:      Substance Use History:   Social History     Substance and Sexual Activity   Alcohol Use Not Currently    Comment: 2 x year; being a social drinker as per all scripts      Social History     Tobacco Use   Smoking Status Never   Smokeless Tobacco Never     Social History     Substance and Sexual Activity   Drug Use No       Family History:  Family History   Problem Relation Age of Onset    Colon cancer Mother     Alzheimer's disease Father     Stroke Father     No Known Problems Sister     Colon cancer Brother     Bipolar disorder Brother     No Known Problems Brother     Depression Paternal Grandfather     Breast cancer Maternal Aunt     Colon cancer Maternal Aunt     Seizures Son     Heart disease Other     Diabetes Other     Hypertension Other     Thyroid disease Neg Hx        Physical Exam:     Vitals:   Blood Pressure: 146/92 (01/10/25 0009)  Pulse: 68 (01/10/25 0009)  Temperature: 98.3 °F (36.8 °C) (01/10/25 0009)  Respirations: 20 (01/10/25 0034)  SpO2: 96 % (01/10/25 0034)    Constitutional:  Well developed, well nourished, no acute distress, non-toxic appearance   Eyes:  PERRL, conjunctiva normal   HENT:  Atraumatic, external ears normal, nose normal, oropharynx moist, no pharyngeal exudates. Neck- normal range of motion, no tenderness,  supple   Respiratory:  No respiratory distress, normal breath sounds, no rales, no wheezing   Cardiovascular:  Normal rate, normal rhythm, no murmurs, no gallops, no rubs   GI:  Soft, distended, normal bowel sounds, nontender, no organomegaly, no mass, no rebound, no guarding   :  No costovertebral angle tenderness   Musculoskeletal:  No edema, no tenderness, no deformities. Back- no tenderness  Integument:  Well hydrated, no rash, back dressing in place, CDI  Lymphatic:  No lymphadenopathy noted   Neurologic:  Alert &awake, communicative, CN 2-12 normal, normal motor function, normal sensory function, no focal deficits noted   Psychiatric:  Speech and behavior appropriate       Lab Results: I have reviewed laboratory reports/results from this admission    Results from last 7 days   Lab Units 01/09/25  1933 01/04/25  0539 01/03/25  0315   WBC Thousand/uL 7.05   < > 9.50   HEMOGLOBIN g/dL 11.1*   < > 9.3*   HEMATOCRIT % 35.0   < > 28.7*   PLATELETS Thousands/uL 568*   < > 405*   SEGS PCT %  --   --  81*   LYMPHO PCT %  --   --  9*   MONO PCT %  --   --  8   EOS PCT %  --   --  1    < > = values in this interval not displayed.     Results from last 7 days   Lab Units 01/08/25  0528   SODIUM mmol/L 142   POTASSIUM mmol/L 3.4*   CHLORIDE mmol/L 104   CO2 mmol/L 30   BUN mg/dL 8   CREATININE mg/dL 0.66   ANION GAP mmol/L 8   CALCIUM mg/dL 8.2*   GLUCOSE RANDOM mg/dL 91         Results from last 7 days   Lab Units 01/06/25  0720 01/05/25  1611 01/04/25  2110   POC GLUCOSE mg/dl 102 163* 119         Results from last 7 days   Lab Units 01/03/25  0315   LACTIC ACID mmol/L 0.8   PROCALCITONIN ng/ml 0.36*       EKG: Reviewed    Imaging: Results Review Statement: I reviewed the below radiology reports-    XR abdomen 1 vw portable  Result Date: 1/9/2025  Narrative: XR ABDOMEN 1 VW PORTABLE INDICATION: ng tube placement. COMPARISON: 1/9/2025 FINDINGS: NG tube projects over the gastric antrum. Bowel distention seen previously  has improved. No evidence of pneumoperitoneum on this supine study. Upright or left lateral decubitus imaging is more sensitive to detect subtle free air in the appropriate setting. No pathologic calcification or soft tissue mass. Clear lung bases. There is extensive spinal hardware.     Impression: NG tube projects over the gastric antrum. Improved bowel distention. Workstation performed: [id number]     CT abdomen pelvis w contrast  Result Date: 1/9/2025  Narrative: CT ABDOMEN AND PELVIS WITH IV CONTRAST INDICATION: obstruction vs ileus on xr abdomen. . COMPARISON: 5/25/2023. TECHNIQUE: CT examination of the abdomen and pelvis was performed. Multiplanar 2D reformatted images were created from the source data. This examination, like all CT scans performed in the [facility], was performed utilizing techniques to minimize radiation dose exposure, including the use of iterative reconstruction and automated exposure control. Radiation dose length product (DLP) for this visit: 1137 mGy-cm IV Contrast: 50 mL of iohexol (OMNIPAQUE) 100 mL of iohexol (OMNIPAQUE) Enteric Contrast: Administered. FINDINGS: ABDOMEN LOWER CHEST: Trace bilateral pleural effusions left slightly greater than the right. LIVER/BILIARY TREE: There is a 4 mm hypodense lesion in the dome of the liver on series 2, image 21, too small to accurately characterize but is not significantly changed. No significant biliary ductal dilatation. GALLBLADDER: Suspected noncalcified gallstones. No pericholecystic inflammation. SPLEEN: Unremarkable. PANCREAS: Unremarkable. ADRENAL GLANDS: Unremarkable. KIDNEYS/URETERS: Unremarkable. No hydronephrosis. STOMACH AND BOWEL: There is a small hiatal hernia. There are some dilated contrast and fluid filled loops of small bowel noted measuring up to 4 cm in diameter. The distal small bowel loops are relatively normal in caliber. This is suggestive of an underlying small bowel obstruction. The transition point  appears to be in the central abdomen where there is slight swirling of the mesenteric vessels. An underlying internal hernia cannot be fully excluded. The colon is not completely collapsed. There is fluid seen in the colon suggestive of diarrheal illness but no colonic wall thickening is seen to suggest a colitis. APPENDIX: No findings to suggest appendicitis. ABDOMINOPELVIC CAVITY: Small ascites in the pelvis. Trace ascites surrounding the spleen and liver. No free air. No lymphadenopathy. VESSELS: Unremarkable for patient's age. PELVIS REPRODUCTIVE ORGANS: Status post hysterectomy. URINARY BLADDER: Unremarkable. ABDOMINAL WALL/INGUINAL REGIONS: There is inflammatory stranding and pockets of gas in the right lower anterior abdominal wall which may be related to subcutaneous injections. There is a right sacral neurostimulator device. Previously noted stimulator leads have been removed in the interim with a small retained fragment seen in the posterior lower back. In the lower back is ill-defined fluid with pockets of soft tissue gas and  surrounding inflammatory stranding suspicious for abscess. This measures approximately 3.5 x 2.9 x 9.5 cm. BONES: Stable extensive postsurgical changes in the thoracolumbar spine.     Impression: Dilated loops of small bowel as described above suggestive of underlying small bowel obstruction. The transition point appears to be in the central abdomen where there is slight swirling of the mesenteric vessels. An underlying internal hernia cannot be fully excluded. Fluid seen in the colon suggestive of diarrheal illness without colonic wall thickening to suggest colitis. Trace bilateral pleural effusions. Cholelithiasis. Small ascites. Previously noted neurostimulator leads have been removed in the interim although there appears to be a small retained fragment seen in the posterior lower back. Ill-defined fluid with pockets of soft tissue gas and surrounding inflammatory stranding in  the midline lower back suspicious for abscess. Workstation performed: [id number]     XR abdomen 1 view kub  Result Date: 1/9/2025  Narrative: ABDOMEN INDICATION: Abdominal cramping, vomiting. COMPARISON: Abdominal radiographs from 3/29/2023 and CT of the abdomen and pelvis from 5/25/2023 TECHNIQUE: AP supine view of the abdomen - 3 images. FINDINGS: There are several dilated loops of presumably small bowel in the abdomen measuring up to 4.6 cm in diameter. No evidence of pneumoperitoneum. No pathologic calcifications. Visualized osseous structures demonstrate posterior fusion hardware throughout the imaged spine, and extending into both sacroiliac joints. The lower lumbar spine hardware appears to have been exchanged since 2023. There is a spinal stimulator generator projecting over the right hemiabdomen, however no connected leads are seen on the current study. There is tubing projecting over the right hemiabdomen which extends beyond the level of the abdominal cavity. Its tip is obscured by overlying spinal hardware. This is of unclear etiology, possibly representing a surgical drain.     Impression: Several dilated loops of presumably small bowel measuring up to 4.5 cm in diameter. This may indicate presence of small bowel obstruction or ileus. Recommend further evaluation with CT abdomen and pelvis, preferably with IV and oral contrast if feasible,  or serial abdominal radiographs after oral contrast administration. The study was marked in EPIC for immediate notification. Workstation performed: [id number]     XR chest portable  Result Date: 1/6/2025  Narrative: XR CHEST PORTABLE INDICATION: fever, rsv, f/u for developing bacterial pna. COMPARISON: 1/3/2025 FINDINGS: Clear lungs. No pneumothorax or pleural effusion. Normal cardiomediastinal silhouette. Bones are unremarkable for age. Thoracolumbar fixation rods noted as before. Normal upper abdomen.     Impression: No acute cardiopulmonary disease. Workstation  performed: [id number]     XR hip/pelv 2-3 vws left if performed  Result Date: 1/5/2025  Narrative: XR HIP/PELV 2-3 VWS LEFT  W PELVIS IF PERFORMED INDICATION: Left hip pain. COMPARISON: 4/5/2023 FINDINGS: No acute fracture or dislocation. No significant hip degenerative changes. No lytic or blastic osseous lesion. Several phleboliths are seen within the pelvis. Extensive anterior and posterior fusion of the lumbar spine and lumbosacral junction with pedicle screws, spinal rods and anterior hardware.     Impression: No acute osseous abnormality. Computerized Assisted Algorithm (CAA) may have been used to analyze all applicable images. Workstation performed: [id number]     XR chest portable  Result Date: 1/3/2025  Narrative: XR CHEST PORTABLE INDICATION: fever. COMPARISON: 7/18/2023 FINDINGS: Clear lungs. No pneumothorax or pleural effusion. Normal cardiomediastinal silhouette. Spine fixation hardware noted as before. Normal upper abdomen.     Impression: No acute cardiopulmonary disease. Workstation performed: [id number]     CT lumbar spine without contrast  Result Date: 1/2/2025  Narrative: CT LUMBAR SPINE WITHOUT CONTRAST INDICATION:   recent spinal procedure, increased pain. COMPARISON: Lumbar spine radiographs dated 12/21/2024, CT lumbar spine dated 3/8/2020 TECHNIQUE:  Contiguous axial images through the lumbar spine were obtained. Sagittal and coronal reconstructions were performed. IV Contrast: No Radiation dose length product (DLP) for this visit:  1426 mGy-cm .  This examination, like all CT scans performed in the [facility], was performed utilizing techniques to minimize radiation dose exposure, including the use of iterative reconstruction and automated exposure control. IMAGE QUALITY:  Diagnostic. FINDINGS: ALIGNMENT:  There are 5 lumbar type vertebral bodies. Thoracolumbar levoscoliosis VERTEBRAE:  No fracture.  No lytic or blastic lesion. T7-S1 posterior postsurgical fusion,  partially imaged, with anterior fusion and intervertebral body disc spacers from L2-L3 to L5-S1. The left L3 pedicle screw extends laterally past the left aspect of the L3 vertebral body. No discrete evidence of hardware fracture or loosening. Extensive degenerative endplate changes at L5-S1. Lower Thoracic spine: Normal lower thoracic disc spaces.     Impression: No acute fracture or dislocation. Postoperative changes as detailed above. Workstation performed: [id number]      VAS VENOUS DUPLEX - LOWER LIMB BILATERAL  Result Date: 12/22/2024  Narrative:  [facility] REPORT CLINICAL: Indications: Patient presents to r/o DVT following lumbar fusion. Patient denies new leg pain or swelling. Operative History: No cardiovascular surgeries Risk Factors The patient has history of HTN.   CONCLUSION:  Impression: RIGHT LOWER LIMB: No evidence of acute or chronic deep vein thrombosis. No evidence of superficial thrombophlebitis noted. Doppler evaluation shows a normal response to augmentation maneuvers. Popliteal, posterior tibial and anterior tibial arterial Doppler waveforms are triphasic.  LEFT LOWER LIMB: No evidence of acute or chronic deep vein thrombosis. No evidence of superficial thrombophlebitis noted. Doppler evaluation shows a normal response to augmentation maneuvers. Popliteal, posterior tibial and anterior tibial arterial Doppler waveforms are triphasic.  SIGNATURE: Electronically Signed by: JOANNA ADDISON MD, RPVI on 2024-12-22 01:44:47 PM    XR spine lumbar 2 or 3 views injury  Result Date: 12/22/2024  Narrative: XR SPINE LUMBAR 2 OR 3 VIEWS INJURY INDICATION: s/p lumbar fusion surgery. COMPARISON: Lumbar spine radiograph 12/19/2024 FINDINGS: Spinal fusion hardware seen in the thoracolumbar spine which are seen extending from T7-S1 vertebral. Hardware remains unchanged Anterior fusion seen at L5-S1, L4-5, L3-4, L2-3 Bilateral S2 alar screws are inserted Intact pedicles. Five non-rib-bearing lumbar  vertebral bodies. Normal alignment. Unremarkable soft tissues.     Impression: Posterior spinal fusion hardware extending from T7-S1 with as seen on the previous study Bilateral new S2 alar screws seen Computerized Assisted Algorithm (CAA) may have been used to analyze all applicable images. Resident: Jeanie Gutierrez I, the attending radiologist, have reviewed the images and agree with the final report above. Workstation performed: [id number]     XR spine lumbar 2 or 3 views injury  Result Date: 12/20/2024  Narrative: C-ARM - XR SPINE LUMBAR 2 OR 3 VIEWS INJURY INDICATION: Scoliosis, unspecified scoliosis type, unspecified spinal region. Procedure guidance. TECHNIQUE: Fluoroscopic guidance provided. COMPARISON: None FLUOROSCOPY TIME: 48.53 SEC 1225 FLUOROSCOPIC IMAGES FINDINGS: Fluoroscopy provided for procedure guidance. Osseous and soft tissue detail limited by technique.     Impression: Fluoroscopy provided for procedure guidance. Please refer to the separate procedure note for additional details. Workstation performed: [id number]         ** Please Note: Dragon 360 Dictation voice to text software was used in the creation of this document. **      ** Please Note: This note has been constructed using a voice recognition system. **

## 2025-01-10 NOTE — ASSESSMENT & PLAN NOTE
Patient with hx of chronic pain  Multiple short prescriptions of opioids over the past few months    Outpatient regimen includes:  MS IR 15 mg tablets twice daily as needed (recently filled 12/30/2024 #30tabs/15days)  Diclofenac sodium 2 g gel 4 times daily as needed  Cyclobenzaprine 10 mg 3 times daily as needed  Lyrica 150 mg 3 times daily    PDMP review:  Filled  Written  Drug  QTY  Days  Prescriber  Refill  Daily Dose*      12/30/2024 12/30/2024 Pregabalin 150 Mg Capsule 45.00 15 Mi Rum 0 3.01 LME PA   12/30/2024 12/30/2024 Morphine Sulfate Ir 15 Mg Tab 30.00 15 Mi Rum 0 30.00 MME PA   12/03/2024 12/02/2024 Morphine Sulfate Ir 15 Mg Tab 60.00 30 Ch longterm 0 30.00 MME PA   12/03/2024 12/02/2024 Pregabalin 150 Mg Capsule 90.00 30 Ch longterm 0 3.01 LME PA   11/13/2024 10/22/2024 Morphine Sulfate Ir 15 Mg Tab 24.00 4 Mi Rum 0 90.00 MME PA   11/08/2024 11/08/2024 Pregabalin 150 Mg Capsule 90.00 30 Mi Rum 0 3.01 LME PA   11/03/2024 09/28/2024 Pregabalin 150 Mg Capsule 6.00 2 Mi Rum 0 3.01 LME PA   11/03/2024 10/22/2024 Morphine Sulfate Ir 15 Mg Tab 30.00 5 Mi Rum 0 90.00 MME PA   10/23/2024 10/22/2024 Morphine Sulfate Ir 15 Mg Tab 30.00 5 Mi Rum 0 90.00 MME PA   10/22/2024 10/10/2024 Morphine Sulfate Ir 15 Mg Tab 6.00 1 Mi Rum 0 90.00 MME PA   10/12/2024 09/28/2024 Pregabalin 150 Mg Capsule 42.00 14 Mi Rum 0 3.01 LME PA   10/10/2024 10/10/2024 Morphine Sulfate Ir 15 Mg Tab 24.00 4 Mi Rum 0 90.00 MME PA   09/28/2024 09/28/2024 Pregabalin 150 Mg Capsule 42.00 14 Mi Rum 0 3.01 LME PA   09/28/2024 09/28/2024 Morphine Sulfate Ir 15 Mg Tab 30.00 5 Mi Rum 0 90.00 MME PA   09/05/2024 09/05/2024 Pregabalin 100 Mg Capsule 90.00 30 Am Yes 0 2.01 LME PA

## 2025-01-10 NOTE — OP NOTE
OPERATIVE REPORT  PATIENT NAME: Samantha Rodriguez    :  1965  MRN: [id number]  Pt Location: [facility] OR ROOM 18    SURGERY DATE: 1/10/2025    Surgeons and Role:     * Bayron Bills MD - Primary     * Marilyn Saba MD - Assisting    Preop Diagnosis:  Fusion of lumbar spine [M43.26]    Post-Op Diagnosis Codes:     * Fusion of lumbar spine [M43.26]    Procedure(s):  Bilateral - Incision and Drainage Lumbar Spine    Specimen(s):  ID Type Source Tests Collected by Time Destination   A : Lumbar Tissue Back ANAEROBIC CULTURE AND GRAM STAIN, CULTURE, TISSUE AND GRAM STAIN Bayron Bills MD 1/10/2025 1220    B : Deep Lumbar Tissue Back ANAEROBIC CULTURE AND GRAM STAIN, CULTURE, TISSUE AND GRAM STAIN Bayron Bills MD 1/10/2025 1227        Estimated Blood Loss:   Minimal    Drains:  Closed/Suction Drain Back Bulb 19 Fr. (Active)   Site Description Unable to view 01/10/25 1445   Dressing Status Clean;Dry;Intact 01/10/25 1445   Drainage Appearance Bloody 01/10/25 1445   Status To bulb suction 01/10/25 1445   Output (mL) 40 mL 01/10/25 1445   Number of days: 0       Closed/Suction Drain Back Bulb 19 Fr. (Active)   Site Description Unable to view 01/10/25 1445   Dressing Status Clean;Dry;Intact 01/10/25 1445   Drainage Appearance Bloody 01/10/25 1445   Status To bulb suction 01/10/25 1445   Output (mL) 40 mL 01/10/25 1445   Number of days: 0       NG/OG/Enteral Tube Nasogastric 16 Fr Right nare (Active)   Site Assessment Clean;Dry;Intact 01/10/25 0800   Securement Assessment Adhesive Securement 01/10/25 1445   External Tube Length (cm) 60 cm 25 1901   Marking at Nare (cm) - For ongoing assessment of tube placement 60 cm 25 190   Status Suction-low intermittent 01/10/25 1445   Drainage Appearance Green 01/10/25 1445   Output (mL) 50 mL 01/10/25 1445   Number of days: 1       Anesthesia Type:   Choice    Operative Indications:  Fusion of lumbar spine [M43.26]    59-year-old female with low back pain and  ambulatory dysfunction in the setting lumbar degenerative disease, previous thoracolumbar fusion with pseudoarthrosis s/p revision thoracolumbar pelvic fusion surgery with wound dehiscence, possible postoperative infection. The risks, benefits and alternatives to surgery were discussed and patient elected to proceed with surgical intervention.     Plan for posterior lumbar wound I&D.  Consent obtained. Prior to surgery in the holding area, I again explained in detail to the patient risks of surgery which include the risk of nerve injury, persistent, and/or worsening pain, spinal fluid leak, infection, and/or meningitis, excessive bleeding, paralysis, death, blindness, blood vessel injury, need for further surgery, instability, as well as the potential for unforeseen medical and surgical complications - as well as incomplete wound healing, infection, possibility of secondary revision surgery.  Samantha Rodriguez had no further questions.     Operative Findings:  Superficial fluid cloudy collection, intact fascia    Complications:   None    Procedure and Technique:    Patient was identified in the preoperative holding area.  The operative site was appropriately marked.       Patient was taken to the operating room.   Sequential compression devices were applied to bilateral lower extremities.  The patient was placed under general endotracheal anesthesia.  Patient was placed prone on the Michael frame.   All bony prominences were properly padded.   The operative field was then prepped and draped in the normal sterile fashion.  A time-out was performed.  All parties were in agreement.  A headlamp and surgical loops were utilized during the procedure.     The proximal part of the surgical site was intact with no surrounding erythema.  The distal aspect of the surgical site had approximately 4 cm area of wound dehiscence that proved down to the fascia.  There is no significant surrounding erythema.  There was drainage of  cloudy fluid.  Incision was made through the previous surgical site.   Dissection was carried down through the subcutaneous tissue.  We carried the incision down to the deep fascia which appeared intact.  Upon entering the subcutaneous layer there was a fluid collection which appeared serous and cloudy in nature.  Superficial cultures were taken.  Deep Vicryl sutures were removed from the fascia and the deep space was explored.  There was only serous fluid deep to the fascia which did not appear pathologic in nature.  Deep cultures were taken.  There was no gross purulence throughout the wound.   We then began an extensive debridement of the soft tissue with a rongeur and curettes.  Fibrinous, unhealthy tissue was easily removed with a use of a rongeur.  A curette was used to debride the sidewalls as well as the deep fascial layer until healthy bleeding tissue was encountered.  Saline via cystoscopy tubing was used to thoroughly irrigate the wound.  We then removed all instruments from the surgical area.   Vanomycin powder was placed in the wound bed.  A deep drain was placed in the wound bed.   #1 Vicryl was used to close the deep fascial layer.  A superficial drain was placed in the wound bed.  2-0 PDS was used to close the subcutaneous layer.  3-0 Nylon and staples were used to close skin.  A mepilex dressing was secured along with the drain.       The sponge and needle count were reported as correct at the end of the case.     The patient was gently flipped supine, emerged from anesthesia, and extubated.  Patient was transferred to the recovery room in stable condition.      I was present for the entire procedure    Patient Disposition:  PACU        SIGNATURE: Bayron Bills MD  DATE: January 10, 2025  TIME: 5:20 PM

## 2025-01-10 NOTE — ASSESSMENT & PLAN NOTE
History of severe MDD and AMUARY with panic attacks  Continue PTA medications with Abilify, Zoloft, BuSpar, Minipress, trazodone and Atarax

## 2025-01-10 NOTE — QUICK NOTE
Pt had multiple BMs yesterday and is passing flatus today. Has no n/v and is hungry. Abdomen is soft nondistended nontender. Also had sips with meds earlier with no issue.  Removed NGT and started CLD.

## 2025-01-10 NOTE — ASSESSMENT & PLAN NOTE
Samantha is a 58 y/o F presenting 4 weeks s/p  weeks s/p removal hardware L2-S1, posterior fusion L5-S1, revision instrumentation L2-pelvis, bilateral S2 alar screws with Dr. Bills and Dr. Stevens (DOS 12/19/24) with concern for wound dehiscence.     Now s/p I&D lumbar spine. Doing well     WBAT all extremities  Regular diet  Continue ancef, f/u OR cultures  Monitor 2x lumbar drains   Multimodal pain control  PT/OT

## 2025-01-10 NOTE — ASSESSMENT & PLAN NOTE
Status post removal of hardware L2-S1, posterior fusion L5-S1, revision instrumentation L2-pelvis, bilateral S2 alar screws with orthopedic surgery on 12/19.  Presented with wound dehiscence and concern for abscess.  Now status post I&D lumbar spine 1/10/2025.  Patient remains n.p.o. with NGT in place for SBO/ileus    Multimodal analgesia:  Intravenous Tylenol 1000 mg every 8 hours scheduled  Start intravenous Robaxin 1000 mg every 8 hours scheduled  Lidoderm patch 12 hours on/12 hours off to affected area and avoiding incision  Lyrica 150 mg 3 times daily, home medication  IV Dilaudid 0.5 mg every 2 hours as needed for severe/breakthrough pain  Narcan as needed for respiratory pression/opioid reversal  Bowel regimen when appropriate from a surgical standpoint to prevent opioid-induced constipation    Once no longer n.p.o. consider the following regimen:  Tylenol 975 mg p.o. every 8 hours scheduled  Robaxin 750 mg p.o. every 6 hours scheduled  Lidoderm patch 12 hours on/12 hours off to affected area and avoiding incision  Lyrica 150 mg 3 times daily, home medication  Oxycodone 5 mg every 4 hours as needed for moderate pain  Oxycodone 10 mg every 4 hours as needed for severe pain  IV Dilaudid 0.5 mg every 4 hours as needed for breakthrough pain

## 2025-01-10 NOTE — DISCHARGE SUMMARY
"Discharge Summary - Hospitalist   Name: Samantha Rodriguez 59 y.o. female I MRN: [id number]  Unit/Bed#: [place] [address] -01 I Date of Admission: 1/2/2025   Date of Service: 1/9/2025 I Hospital Day: 6     Assessment & Plan  Wound dehiscence  Reviewed images with wound care from admission.  Discussed with orthopedist after reviewing quick note who noted small area of dehiscence at the distal aspect of the wound  Orthopedics managing wound, wound VAC in place  Patient is on IV Ancef which has treated her cystitis, this is providing empiric coverage and orthopedics is recommending to continue this  CT a/p demonstrating \" Ill-defined fluid with pockets of soft tissue gas and surrounding inflammatory stranding in the midline lower back suspicious for abscess.\"  Discussed with Dr. Bills who is recommending transfer to [facility] for OR washout.  Discussed with patient and family who is agreeable.  Fortunately she remains without bacteremia  Abdominal pain  Daughter notes day PTA she threw up prior to coming to the emergency room   Has been having mid epigastric sharp pain worsened that started overnight  Had 1 episode of vomiting this AM after some clear soup last PM  X-ray abdomen with several dilated loops with questionable SBO or ileus.  Patient did have large loose BM yesterday and some sips of water this morning.   No significant distention on exam.  Some hypoactive bowel sounds but lower quadrants with normal active bowel sounds  CT abdomen pelvis with contrast demonstrating \"Dilated loops of small bowel as described above suggestive of underlying small bowel obstruction. The transition point appears to be in the central abdomen where there is slight swirling of the mesenteric vessels. An underlying internal hernia cannot be fully excluded. Fluid seen in the colon suggestive of diarrheal illness without colonic wall thickening to suggest colitis.\"  Nursing to hold oral meds this morning, NPO and start " IVF  Surgery consulted  NGT placed  Ambulatory dysfunction  Patient reports after getting back from rehab she has been struggling to walk secondary to her back pain  She typically ambulates with a walker  PT and OT are recommending rehab  Lumbar radiculopathy  Status post removal L2-S1 hardware after failure of therapy, with new L5-S1 fusion performed by Dr. Stevens on 12/19 without noted complication  Patient continues to have pain limiting her ambulation although notes that her pain is improving  Without red flag signs or symptoms  Previous provider reviewed CT scan with the radiologist which did not show any central canal narrowing  Orthopedics following and managing Prevena iVAC over incision site  Recommending LSO brace with OOB  Patient was not getting any relief with home morphine and started on scheduled Tylenol oxycodone as needed.  She is on Lyrica PTA  See plan below  SIRS (systemic inflammatory response syndrome) (HCC)  SIRS on admission noted with fever of 101.2 on 1/5/2025  No further fever since.  Remains without leukocytosis  Suspect this was in setting of UTI and RSV  Blood cultures remain negative to date  Management of wound as stated above  RSV bronchitis  With coughing and wheezing, tested positive for RSV on 1/3/2025  Patient was started on IV steroids with scheduled nebulizers  Chest x-ray without acute findings  Completed 5-day therapy of steroids, wheezing improved on room air  Continue antitussives and supportive care  Nebulizer treatment as needed  Urinary tract infection  Urine cultures obtained on admission growing >100,000 CFU of E.coli  Patient has received adequate IV antibiotic treatment for this  Essential hypertension  Blood pressure is well-controlled.  Amlodipine 5 mg with hold parameters  Bipolar disorder (HCC)  Patient was [facility] November [facility], reviewed hospital stay  With history of severe major depressive disorder and generalized anxiety disorder with panic  attacks  Continue PTA medication with Abilify 10 mg qd, Zoloft 200 mg qhs, BuSpar 15 mg TID, Minipress 2 mg qhs and trazodone qhs prn, atarax 25 mg tid  Slow transit constipation  Placed on Senokot as with large loose bowel movements, last one 1/8/2025  Hold meds, CT abd pelvis as stated above  Anemia  Patient with average hemoglobin 12-13 in 2023, averaging in the 9's since her surgery in December  No evidence of bleeding on exam  Reviewed EGD and colonoscopy from February 2024  Follow-up iron panel and CBC  GERD without esophagitis  PPI was increased due to being on steroids  Tardive dyskinesia  Patient is on Ingrezza which is non formulary. Attempted to call family with no answer to bring this in  Follows OP psychiatry and Neurology for this  Being substituted with Cogentin 1 mg twice daily for this  Chronic pain disorder  Was seen [facility] previously, medication management as above  Attempting to wean off opioids, suspect potential ileus vs SBO related to narcotics  History of CVA (cerebrovascular accident)  Reviewed OP neuro note 6/2024, continue aspirin and statin     Medical Problems       Resolved Problems  Date Reviewed: 1/10/2025   None       Discharging Physician / Practitioner: Sarah Paul PA-C  PCP: Glo Valente,   Admission Date:   Admission Orders (From admission, onward)       Ordered        01/03/25 1445  INPATIENT ADMISSION  Once            01/02/25 2247  Place in Observation  Once                          Discharge Date: 01/10/25    Consultations During Hospital Stay:  Ortho Surgery  General surgery    Procedures Performed:   None    Significant Findings / Test Results:   1/09 CT a/p demonstrating  Dilated loops of small bowel as described above suggestive of underlying small bowel obstruction. The transition point appears to be in the central abdomen where there is slight swirling of the mesenteric vessels. An underlying internal hernia cannot be fully excluded.   Fluid seen in the colon  "suggestive of diarrheal illness without colonic wall thickening to suggest colitis.  Trace bilateral pleural effusions.  Cholelithiasis.  Small ascites.  Previously noted neurostimulator leads have been removed in the interim although there appears to be a small retained fragment seen in the posterior lower back. Ill-defined fluid with pockets of soft tissue gas and surrounding inflammatory stranding in the midline lower back suspicious for abscess.  1/09 XR abdomen KUB \"Several dilated loops of presumably small bowel measuring up to 4.5 cm in diameter. This may indicate presence of small bowel obstruction or ileus. Recommend further evaluation with CT abdomen and pelvis, preferably with IV and oral contrast if feasible,or serial abdominal radiographs after oral contrast administration.        Incidental Findings:   None    Test Results Pending at Discharge (will require follow up):   None     Outpatient Tests Requested:  None    Complications:  None    Reason for Admission: ambulatory dysfunction    Hospital Course:   Samantha Rodriguez is a 59 y.o. female patient who originally presented to the hospital on 1/2/2025 due to ambulatory dysfunction.  She reported at that time that she was struggling to walk secondary to back pain after being discharged from rehab.  On admission patient also instantly found to be fulfilling SIRS criteria.  She was subsequently diagnosed with UTI and RSV.  There was suspicion for possible wound dehiscence, so Ortho was consulted at that time.  Ortho felt that site was not dehisced and more likely sacral wound. Patient was initiated on IV Ancef every 8 hours on 1/03 for possible early superficial infection around the site.  Ortho planned to place wound VAC.  Wound VAC applied 1/05.  On 1/09 patient with worsening abdominal pain prompting KUB which demonstrated several dilated loops with questionable SBO or ileus.  CT was ordered at that time with general surgery consultation.  Patient was " made n.p.o., started on IV fluids, and NG tube was placed.  CT also demonstrated ill-defined fluid with pockets of soft tissue gas and surrounding inflammatory stranding in the midline lower back suspicious for abscess.  It was recommended that patient be transferred to [facility] for OR washout following this result.    Please see above list of diagnoses and related plan for additional information.     Condition at Discharge: stable    Discharge Day Visit / Exam:   * Please refer to separate progress note for these details *    Discussion with Family: Attempted to update  (daughter) via phone. Left voicemail.     Discharge instructions/Information to patient and family:   See after visit summary for information provided to patient and family.      Provisions for Follow-Up Care:  See after visit summary for information related to follow-up care and any pertinent home health orders.      Mobility at time of Discharge:   Basic Mobility Inpatient Raw Score: 17  JH-HLM Goal: 5: Stand one or more mins  JH-HLM Achieved: 6: Walk 10 steps or more  HLM Goal achieved. Continue to encourage appropriate mobility.     Disposition:   Acute Care Hospital Transfer to [facility]    Planned Readmission: yes    Discharge Medications:  See after visit summary for reconciled discharge medications provided to patient and/or family.      Administrative Statements   Discharge Statement:  I have spent a total time of 30 minutes in caring for this patient on the day of the visit/encounter. >30 minutes of time was spent on: Diagnostic results, Impressions, Counseling / Coordination of care, Documenting in the medical record, Reviewing / ordering tests, medicine, procedures  , and Communicating with other healthcare professionals .    **Please Note: This note may have been constructed using a voice recognition system**

## 2025-01-10 NOTE — ANESTHESIA PREPROCEDURE EVALUATION
Procedure:  Incision and Drainage Lumbar Spine (Bilateral: Spine Lumbar)    RSV bronchitis 01/04/25  - wheezing on exam, duoneb administered    Relevant Problems   CARDIO   (+) Essential hypertension   (+) Migraine   (+) Mixed hyperlipidemia      GI/HEPATIC   (+) Dysphagia   (+) GERD without esophagitis   (+) Small bowel obstruction (HCC)      HEMATOLOGY   (+) Acute blood loss anemia   (+) Anemia      MUSCULOSKELETAL   (+) Chronic low back pain, unspecified back pain laterality, unspecified whether sciatica present   (+) Fibromyalgia   (+) Lumbar spondylosis      NEURO/PSYCH   (+) CVA (cerebral vascular accident) (HCC)   (+) Chronic low back pain, unspecified back pain laterality, unspecified whether sciatica present   (+) Chronic pain disorder   (+) Fibromyalgia   (+) Generalized anxiety disorder with panic attacks   (+) Migraine   (+) PTSD (post-traumatic stress disorder)   (+) Panic disorder without agoraphobia   (+) Severe episode of recurrent major depressive disorder, without psychotic features (HCC)      PULMONARY   (+) MIMI (obstructive sleep apnea)        Physical Exam    Airway    Mallampati score: II  TM Distance: >3 FB  Neck ROM: full     Dental   No notable dental hx     Cardiovascular  Rhythm: regular, Rate: normal, Cardiovascular exam normal    Pulmonary   Wheezes    Other Findings  post-pubertal.      Anesthesia Plan  ASA Score- 3     Anesthesia Type- general with ASA Monitors.         Additional Monitors:     Airway Plan: ETT.    Comment: Risks/benefits and alternatives discussed including medication reaction, sore throat, aspiration, dental/oropharyngeal/ocular injuries, and/or grave/life threatening anesthetic and surgical emergencies..       Plan Factors-Exercise tolerance (METS): >4 METS.    Chart reviewed.    Patient summary reviewed.      Patient instructed to abstain from smoking on day of procedure. Patient did not smoke on day of surgery.            Induction- intravenous.    Postoperative  Plan- Plan for postoperative opioid use. Planned trial extubation    Perioperative Resuscitation Plan - Level 1 - Full Code.       Informed Consent- Anesthetic plan and risks discussed with patient.  I personally reviewed this patient with the CRNA. Discussed and agreed on the Anesthesia Plan with the CRNA..

## 2025-01-10 NOTE — PROGRESS NOTES
"Progress Note - Orthopedics   Name: Samantha Rodriguez 59 y.o. female I MRN: [id number]  Unit/Bed#: OR POOL I Date of Admission: 1/9/2025   Date of Service: 1/10/2025 I Hospital Day: 1    Assessment & Plan  Wound dehiscence  Samantha is a 60 y/o F presenting 4 weeks s/p  weeks s/p removal hardware L2-S1, posterior fusion L5-S1, revision instrumentation L2-pelvis, bilateral S2 alar screws with Dr. Bills and Dr. Stevens (DOS 12/19/24) with concern for wound dehiscence.     Now s/p I&D lumbar spine. Doing well     WBAT all extremities  Regular diet  Continue ancef, f/u OR cultures  Monitor 2x lumbar drains   Multimodal pain control  PT/OT    Lumbar radiculopathy  See above      Subjective   59 y.o.female doing well post op. No acute events, no new complaints. Pain well controlled. Denies fevers, chills, CP, SOB, N/V, numbness or tingling. Patient reports no issues with urination or bowel movements.     Objective :  Temp:  [98.3 °F (36.8 °C)-99.3 °F (37.4 °C)] 98.3 °F (36.8 °C)  HR:  [55-68] 60  BP: (131-146)/(73-94) 131/73  Resp:  [18-20] 18  SpO2:  [96 %-99 %] 99 %  O2 Device: None (Room air)    Physical Exam  Musculoskeletal: bilaterallower  Skin intact . No erythema or ecchymosis.  Dressing c/d/i  TTP osna-incisional   Motor intact to +FHL/EHL, +ankle dorsi/plantar flexion  2+ DP pulse  Sensation intact L3-S1  Motor intact L3-S1      Lab Results: I have reviewed the following results:  Recent Labs     01/08/25  0528 01/09/25  1933 01/10/25  0455   WBC 4.56 7.05 7.14   HGB 9.2* 11.1* 9.9*   HCT 29.5* 35.0 31.3*   * 568* 547*   BUN 8  --  13   CREATININE 0.66  --  0.62     Blood Culture:    Lab Results   Component Value Date    BLOODCX No Growth After 5 Days. 01/03/2025    BLOODCX No Growth After 5 Days. 01/03/2025     Wound Culture: No results found for: \"WOUNDCULT\"  "

## 2025-01-10 NOTE — OCCUPATIONAL THERAPY NOTE
Occupational Therapy         Patient Name: Samantha Rodriguez  Today's Date: 1/10/2025     01/10/25 1100   OT Last Visit   OT Visit Date 01/10/25   Note Type   Note type Cancelled Session   Cancel Reasons Patient to operating room   Additional Comments will defer and address OT needs post op         Becky Dunn

## 2025-01-10 NOTE — CONSULTS
Consultation - Acute Pain   Name: Samantha Rodriguez 59 y.o. female I MRN: [id number]  Unit/Bed#: OR POOL I Date of Admission: 1/9/2025   Date of Service: 1/10/2025 I Hospital Day: 1   Inpatient consult to Acute Pain Service  Consult performed by: MERCY Holley  Consult ordered by: MERCY Lindsay        Physician Requesting Evaluation: Bettye Sierra MD   Reason for Evaluation / Principal Problem: postop pain    Assessment & Plan  Wound dehiscence  Status post removal of hardware L2-S1, posterior fusion L5-S1, revision instrumentation L2-pelvis, bilateral S2 alar screws with orthopedic surgery on 12/19.  Presented with wound dehiscence and concern for abscess.  Now status post I&D lumbar spine 1/10/2025.  Patient remains n.p.o. with NGT in place for SBO/ileus    Multimodal analgesia:  Intravenous Tylenol 1000 mg every 8 hours scheduled  Start intravenous Robaxin 1000 mg every 8 hours scheduled  Lidoderm patch 12 hours on/12 hours off to affected area and avoiding incision  Lyrica 150 mg 3 times daily, home medication  IV Dilaudid 0.5 mg every 2 hours as needed for severe/breakthrough pain  Narcan as needed for respiratory pression/opioid reversal  Bowel regimen when appropriate from a surgical standpoint to prevent opioid-induced constipation    Once no longer n.p.o. consider the following regimen:  Tylenol 975 mg p.o. every 8 hours scheduled  Robaxin 750 mg p.o. every 6 hours scheduled  Lidoderm patch 12 hours on/12 hours off to affected area and avoiding incision  Lyrica 150 mg 3 times daily, home medication  Oxycodone 5 mg every 4 hours as needed for moderate pain  Oxycodone 10 mg every 4 hours as needed for severe pain  IV Dilaudid 0.5 mg every 4 hours as needed for breakthrough pain  Chronic pain disorder  Patient with hx of chronic pain  Multiple short prescriptions of opioids over the past few months    Outpatient regimen includes:  MS IR 15 mg tablets twice daily as needed (recently  "filled 12/30/2024 #30tabs/15days)  Diclofenac sodium 2 g gel 4 times daily as needed  Cyclobenzaprine 10 mg 3 times daily as needed  Lyrica 150 mg 3 times daily    PDMP review:  Filled  Written  Drug  QTY  Days  Prescriber  Refill  Daily Dose*      12/30/2024 12/30/2024 Pregabalin 150 Mg Capsule 45.00 15 Mi Rum 0 3.01 LME PA   12/30/2024 12/30/2024 Morphine Sulfate Ir 15 Mg Tab 30.00 15 Mi Rum 0 30.00 MME PA   12/03/2024 12/02/2024 Morphine Sulfate Ir 15 Mg Tab 60.00 30 Ch senior care 0 30.00 MME PA   12/03/2024 12/02/2024 Pregabalin 150 Mg Capsule 90.00 30 Ch senior care 0 3.01 LME PA   11/13/2024 10/22/2024 Morphine Sulfate Ir 15 Mg Tab 24.00 4 Mi Rum 0 90.00 MME PA   11/08/2024 11/08/2024 Pregabalin 150 Mg Capsule 90.00 30 Mi Rum 0 3.01 LME PA   11/03/2024 09/28/2024 Pregabalin 150 Mg Capsule 6.00 2 Mi Rum 0 3.01 LME PA   11/03/2024 10/22/2024 Morphine Sulfate Ir 15 Mg Tab 30.00 5 Mi Rum 0 90.00 MME PA   10/23/2024 10/22/2024 Morphine Sulfate Ir 15 Mg Tab 30.00 5 Mi Rum 0 90.00 MME PA   10/22/2024 10/10/2024 Morphine Sulfate Ir 15 Mg Tab 6.00 1 Mi Rum 0 90.00 MME PA   10/12/2024 09/28/2024 Pregabalin 150 Mg Capsule 42.00 14 Mi Rum 0 3.01 LME PA   10/10/2024 10/10/2024 Morphine Sulfate Ir 15 Mg Tab 24.00 4 Mi Rum 0 90.00 MME PA   09/28/2024 09/28/2024 Pregabalin 150 Mg Capsule 42.00 14 Mi Rum 0 3.01 LME PA   09/28/2024 09/28/2024 Morphine Sulfate Ir 15 Mg Tab 30.00 5 Mi Rum 0 90.00 MME PA   09/05/2024 09/05/2024 Pregabalin 100 Mg Capsule 90.00 30 Am Yes 0 2.01 LME PA     Abdominal pain  Per gen surg: \"Patient with CT scan concerning for SBO, however given patient's presentation this is more likely an ileus. Patient has begun having bowel function, and has had complete resolution of her symptoms. \"    NGT remains in place to suction and patient remains NPO.        [facility] will continue to follow. Please contact Acute Pain Service - via Supercirc[employer]Chat from [phone] with additional questions or concerns. See [employer] or [employer] for " additional contacts and after hours information.     History of Present Illness    HPI: Samantha Rodriguez is a 59 y.o. year old female with history of recent lumbar surgery 12/19/2024 in setting of lumbar radiculopathy.  Initially was hospitalized at the [facility] ambulatory dysfunction and RSV infection.  Patient also found to have wound dehiscence and imaging suspicious for abscess.  She underwent I&D of lumbar spine with orthopedics 1/10/2025 for which APS was consulted for assistance in postoperative pain management.  Of note, patient developed an ileus and has NGT in place to suction per surgical team recommendations.     Seen at bedside this afternoon shortly after returning from PACU.  Patient seen resting in bed without acute distress.  Recently received dose of IV Dilaudid and currently reports lower back pain 8/10 on numerical scale however this is inconsistent with her current presentation.  She does report her pain has improved overall.  She denies abdominal pain nausea/vomiting and has no other acute complaints currently.  Plan to continue the current regimen as outlined above while she remains n.p.o. and plan to transition to primarily oral regimen once NG tube is removed and tolerating diet.    Current pain location(s): Pain Score: 6  Pain Location/Orientation: Location: Back, Location: Incision  Pain Scale: Pain Assessment Tool: FLACC      Pain History: Chronic back pain  Pain Management Physician:  none  I have reviewed the patient's controlled substance dispensing history in the Prescription Drug Monitoring Program in compliance with the [facility] regulations before prescribing any controlled substances.     Review of Systems   Constitutional:  Positive for activity change.   Gastrointestinal:  Negative for abdominal pain, nausea and vomiting.   Musculoskeletal:  Positive for back pain.   All other systems reviewed and are negative.    I have reviewed the patient's PMH, PSH, Social History, Family  History, Meds, and Allergies    Objective :  Temp:  [98.3 °F (36.8 °C)-99.3 °F (37.4 °C)] 98.6 °F (37 °C)  HR:  [55-68] 56  BP: (103-146)/(57-94) 114/58  Resp:  [17-20] 18  SpO2:  [93 %-100 %] 93 %  O2 Device: None (Room air)    Physical Exam  Vitals reviewed.   Constitutional:       General: She is not in acute distress.  Cardiovascular:      Rate and Rhythm: Normal rate.   Pulmonary:      Effort: Pulmonary effort is normal.   Chest:      Chest wall: No tenderness.   Abdominal:      Tenderness: There is no abdominal tenderness.   Musculoskeletal:         General: Tenderness (low back) present.      Cervical back: Normal range of motion.   Skin:     General: Skin is warm and dry.   Neurological:      Mental Status: She is alert and oriented to person, place, and time. Mental status is at baseline.   Psychiatric:         Mood and Affect: Mood normal.         Behavior: Behavior normal.          Lab Results: I have reviewed the following results:  Estimated Creatinine Clearance: 96.1 mL/min (by C-G formula based on SCr of 0.62 mg/dL).  Lab Results   Component Value Date    WBC 7.14 01/10/2025    WBC 4.50 10/26/2015    HGB 9.9 (L) 01/10/2025    HGB 14.9 10/26/2015    HCT 31.3 (L) 01/10/2025    HCT 41.9 10/26/2015     (H) 01/10/2025     10/26/2015         Component Value Date/Time     07/27/2015 1206    K 3.5 01/10/2025 0455    K 3.2 (L) 10/16/2024 2317     01/10/2025 0455     10/16/2024 2317    CO2 27 01/10/2025 0455    CO2 25 12/19/2024 1155    CO2 27 10/16/2024 2317    BUN 13 01/10/2025 0455    BUN 7 10/16/2024 2317    CREATININE 0.62 01/10/2025 0455    CREATININE 0.65 10/16/2024 2317         Component Value Date/Time    CALCIUM 8.0 (L) 01/10/2025 0455    CALCIUM 9.6 10/16/2024 2317    ALKPHOS 69 12/24/2024 0448    ALKPHOS 110 10/16/2024 2317    AST 18 12/24/2024 0448    AST 19 10/16/2024 2317    ALT 13 12/24/2024 0448    ALT 18 10/16/2024 2317    BILITOT 0.51 02/22/2014 0615    TP 5.5  (L) 12/24/2024 0448    TP 7.7 10/16/2024 2317    ALB 3.3 (L) 12/24/2024 0448    ALB 4.6 10/16/2024 2317       Imaging Results Review: No pertinent imaging studies reviewed.  Other Study Results Review: No additional pertinent studies reviewed.

## 2025-01-10 NOTE — CONSULTS
Consultation - Surgery-General   Name: Samantha Rodriguez 59 y.o. female I MRN: [id number]  Unit/Bed#: [facility] 615-01 I Date of Admission: 2025   Date of Service: 1/10/2025 I Hospital Day: 1   Inpatient consult to Acute Care Surgery  Consult performed by: Marv Pratt MD  Consult ordered by: Eleuterio Cleveland DO        Physician Requesting Evaluation: Bettye Sierra MD   Reason for Evaluation / Principal Problem: SBO versus ileus    Assessment & Plan  GERD without esophagitis    Abdominal pain  Patient with CT scan concerning for SBO, however given patient's presentation this is more likely an ileus.  Patient has begun having bowel function, and has had complete resolution of her symptoms.  Labs appear to be within normal limits.  - Can proceed with orthopedic surgical procedure  - Keep NGT in place to suction  - Abdominal checks  - Follow-up lactate  - As needed pain and nausea control    Please contact the SecureChat role for the Surgery-General service with any questions/concerns.    History of Present Illness   Samantha Rodriguez is a 59 y.o. female with history of Anxiety, arthritis, bipolar, depression, fibromylagia, GERD, HLD, HTN, lumbar radiculopathy, migraine, obestity, PTSD, stroke (residual left sided weakness), urinary incontinence, s/p hysterectomy, , Tubal ligation, who presents with concern of SBO versus ileus.  Patient was recently.  Seen at [facility] where she was being managed for a postop wound complication following lumbar sacral instrumentation.  Patient states that over the past 3 days she is experienced abdominal pain with nausea and bloating, and at least 1-2 episodes of emesis.  Since coming to the hospital her nausea has resolved and she has had loose stools with small amounts of flatus after the NGT was placed.  She does endorse a history of chronic constipation.    CT scan performed  shows distended loops of small bowel with some mild pericolic fluid, and concern  for potential SBO with swirling of the mesenteric vessels.  Patient's abdominal exam is benign at this time with WBC.  NGT has put out minimal since insertion.    Review of Systems   All other systems reviewed and are negative.    I have reviewed the patient's PMH, PSH, Social History, Family History, Meds, and Allergies    Objective :  Temp:  [98.3 °F (36.8 °C)-99.3 °F (37.4 °C)] 98.3 °F (36.8 °C)  HR:  [55-68] 55  BP: (131-146)/(73-94) 131/73  Resp:  [18-20] 18  SpO2:  [96 %-99 %] 96 %  O2 Device: None (Room air)      Physical Exam  Vitals reviewed.   Constitutional:       Appearance: Normal appearance.   HENT:      Head: Normocephalic.      Mouth/Throat:      Mouth: Mucous membranes are moist.   Eyes:      Pupils: Pupils are equal, round, and reactive to light.   Cardiovascular:      Rate and Rhythm: Normal rate.   Pulmonary:      Effort: Pulmonary effort is normal.   Abdominal:      General: Abdomen is flat. There is no distension.      Tenderness: There is no abdominal tenderness.   Musculoskeletal:         General: Normal range of motion.   Skin:     General: Skin is warm.      Capillary Refill: Capillary refill takes 2 to 3 seconds.   Neurological:      General: No focal deficit present.      Mental Status: She is alert and oriented to person, place, and time.         Lab Results: I have reviewed the following results:  Recent Labs     01/10/25  0455   WBC 7.14   HGB 9.9*   HCT 31.3*   *   SODIUM 140   K 3.5      CO2 27   BUN 13   CREATININE 0.62   GLUC 80   MG 1.9       Imaging Results Review: I reviewed radiology reports from this admission including: chest xray and CT abdomen/pelvis.  XR abdomen 1 vw portable  Result Date: 1/9/2025  Impression: NG tube projects over the gastric antrum. Improved bowel distention. Workstation performed: [id number]     CT abdomen pelvis w contrast  Result Date: 1/9/2025  Impression: Dilated loops of small bowel as described above suggestive of underlying small  bowel obstruction. The transition point appears to be in the central abdomen where there is slight swirling of the mesenteric vessels. An underlying internal hernia cannot be fully excluded. Fluid seen in the colon suggestive of diarrheal illness without colonic wall thickening to suggest colitis. Trace bilateral pleural effusions. Cholelithiasis. Small ascites. Previously noted neurostimulator leads have been removed in the interim although there appears to be a small retained fragment seen in the posterior lower back. Ill-defined fluid with pockets of soft tissue gas and surrounding inflammatory stranding in the midline lower back suspicious for abscess. Workstation performed: [id number]     XR abdomen 1 view kub  Result Date: 1/9/2025  Impression: Several dilated loops of presumably small bowel measuring up to 4.5 cm in diameter. This may indicate presence of small bowel obstruction or ileus. Recommend further evaluation with CT abdomen and pelvis, preferably with IV and oral contrast if feasible,  or serial abdominal radiographs after oral contrast administration. The study was marked in EPIC for immediate notification. Workstation performed: [id number]           VTE Pharmacologic Prophylaxis: VTE covered by:  [Held by provider] enoxaparin, Subcutaneous     VTE Mechanical Prophylaxis: sequential compression device    Administrative Statements   I have spent a total time of 30 minutes in caring for this patient on the day of the visit/encounter including Diagnostic results, Prognosis, Risks and benefits of tx options, and Instructions for management.

## 2025-01-10 NOTE — PROGRESS NOTES
Progress Note - Hospitalist   Name: Samantha Rodriguez 59 y.o. female I MRN: [id number]  [facility] I Date of Admission: 1/9/2025   Date of Service: 1/10/2025 I Hospital Day: 1     Assessment & Plan  Wound dehiscence  Initially presented to [facility] 1/2 with ambulatory dysfunction.  Recently discharged from rehab following recent back surgery in December 2024.  On admission to [place], patient met criteria and was diagnosed with UTI and RSV.  Patient also noted to have small area of wound dehiscence at the distal aspect of the wound from recent surgical hardware removal.  CT was ordered which showed ill-defined fluid with pockets of soft tissue gas and surrounding inflammatory stranding in the midline lower back suspicious for an abscess therefore patient was transferred to [place] for orthopedic evaluation.  Recent blood cultures 1/3 negative  Continue IV Ancef for now  Plan for OR for washout with ortho  Rest of plan as below   Abdominal pain  Noted to have worsening epigastric sharp pain that started 1/8 overnight with an episode of vomiting  XR abdomen shows severely dilated loops concerning for SBO or ileus  CT A/P shows dilated loops of small bowel suggestive of underlying SBO with transition point in the central abdomen where there is slight swirling of the mesenteric vessel and underlying internal hernia cannot be fully excluded.  Fluid seen in the colon suggestive of diarrheal illness without colon wall thickening to suggest colitis.  Cholelithiasis.  Small ascites.  Previously noted neurostimulator leads have been removed in the interim, does appear to have small retained fragment in the posterior lower back.  Ill-defined fluid in the pocket of the soft tissue gas and surrounding inflammatory stranding in the midline low back suspicious for abscess.  General surgery following, now with + bowel function. Okay to keep NGT in place to suction for now and cleared to proceed with ortho  intervention per gen surg.   Continue IVF  PRN pain/nausea control   Lumbar radiculopathy  S/p removal hardware L2-S1, posterior fusion L5-S1, revision instrumentation L2-pelvis, bilateral S2 alar screws with orthopedic surgery on 12/19. Now dehiscence and concern for abscess as above.  Monitor neurochecks  Weightbearing as tolerated  Plan for OR as above   Chronic pain disorder    Pain management as ordered pending APS consult for additional recommendations   Bipolar disorder (HCC)  History of severe MDD and AMAURY with panic attacks  Continue PTA medications with Abilify, Zoloft, BuSpar, Minipress, trazodone and Atarax  GERD without esophagitis  Continue PPI  Essential hypertension  BP stable  Continue amlodipine and prazosin  Tardive dyskinesia  Patient is on Ingrezza which is nonformulary, Substitute with Cogentin 1 mg twice daily for now  Will have family bring it to be given if able   Outpatient follow-up with psychiatry and neurology  Ambulatory dysfunction  Patient reports after getting back from rehab she has been struggling to walk secondary to her back.  PT/OT evaluations pending   Typically uses walker  History of CVA (cerebrovascular accident)  Continue aspirin and statin  Anemia  Prior hemoglobin range of 12-13 in 2023 however most recently in the 9s since her surgery in December  Hemoglobin currently stable in the 9s  Monitor  RSV bronchitis  1/3 RSV positive  Chest x-ray without acute findings  Completed 5 days of steroid therapy  Supportive care  History of UTI  Treated with 3 days of IV Ancef for E. coli prior to transfer    VTE Pharmacologic Prophylaxis:   Moderate Risk (Score 3-4) - Pharmacological DVT Prophylaxis Contraindicated. Sequential Compression Devices Ordered.    Mobility:      JH-HLM Goal NOT achieved. Continue with multidisciplinary rounding and encourage appropriate mobility to improve upon JH-HLM goals.    Patient Centered Rounds: I performed bedside rounds with nursing staff today.    Discussions with Specialists or Other Care Team Provider: nursing, case management, ortho    Education and Discussions with Family / Patient: Attempted to update  (daughter) via phone. Unable to contact.    Current Length of Stay: 1 day(s)  Current Patient Status: Inpatient   Certification Statement: The patient will continue to require additional inpatient hospital stay due to OR today, rest of plan pending post op course   Discharge Plan: Anticipate discharge in >72 hrs to discharge location to be determined pending rehab evaluations.    Code Status: Level 1 - Full Code    Subjective   Complains of some back pain, otherwise, no complaints.  Passing gas.  No abdominal pain, nausea or vomiting. Agreeable for OR today.     Objective :  Temp:  [98.3 °F (36.8 °C)-99.3 °F (37.4 °C)] 98.3 °F (36.8 °C)  HR:  [55-68] 55  BP: (131-146)/(73-94) 131/73  Resp:  [18-20] 18  SpO2:  [96 %-99 %] 96 %  O2 Device: None (Room air)    Body mass index is 39.53 kg/m².     Input and Output Summary (last 24 hours):     Intake/Output Summary (Last 24 hours) at 1/10/2025 0947  Last data filed at 1/10/2025 0825  Gross per 24 hour   Intake 0 ml   Output --   Net 0 ml       Physical Exam  Vitals and nursing note reviewed.   Constitutional:       General: She is not in acute distress.     Appearance: She is obese.   Cardiovascular:      Rate and Rhythm: Normal rate.   Pulmonary:      Breath sounds: Decreased breath sounds present.   Abdominal:      General: Bowel sounds are normal. There is no distension.      Palpations: Abdomen is soft.      Tenderness: There is no abdominal tenderness.   Musculoskeletal:         General: No swelling.   Skin:     General: Skin is warm.   Neurological:      Mental Status: She is alert and oriented to person, place, and time. Mental status is at baseline.   Psychiatric:      Comments: Tardive dyskinesia noted            Lines/Drains:              Lab Results: I have reviewed the following  results:   Results from last 7 days   Lab Units 01/10/25  0455   WBC Thousand/uL 7.14   HEMOGLOBIN g/dL 9.9*   HEMATOCRIT % 31.3*   PLATELETS Thousands/uL 547*     Results from last 7 days   Lab Units 01/10/25  0455   SODIUM mmol/L 140   POTASSIUM mmol/L 3.5   CHLORIDE mmol/L 102   CO2 mmol/L 27   BUN mg/dL 13   CREATININE mg/dL 0.62   ANION GAP mmol/L 11   CALCIUM mg/dL 8.0*   GLUCOSE RANDOM mg/dL 80         Results from last 7 days   Lab Units 01/06/25  0720 01/05/25  1611 01/04/25  2110   POC GLUCOSE mg/dl 102 163* 119               Recent Cultures (last 7 days):         Imaging Results Review: No pertinent imaging studies reviewed.  Other Study Results Review: No additional pertinent studies reviewed.    Last 24 Hours Medication List:     Current Facility-Administered Medications:     acetaminophen (Ofirmev) injection 1,000 mg, Q8H PRN    acetaminophen (TYLENOL) tablet 975 mg, Q8H KESHIA    albuterol (PROVENTIL HFA,VENTOLIN HFA) inhaler 2 puff, Q4H PRN    aluminum-magnesium hydroxide-simethicone (MAALOX) oral suspension 30 mL, Q4H PRN    amLODIPine (NORVASC) tablet 5 mg, Daily    ARIPiprazole (ABILIFY) tablet 10 mg, Daily    aspirin (ECOTRIN LOW STRENGTH) EC tablet 81 mg, Daily    atorvastatin (LIPITOR) tablet 40 mg, Daily With Dinner    benzonatate (TESSALON PERLES) capsule 100 mg, TID    benztropine (COGENTIN) tablet 1 mg, BID    [Held by provider] bisacodyl (DULCOLAX) rectal suppository 10 mg, Daily PRN    busPIRone (BUSPAR) tablet 15 mg, TID    ceFAZolin (ANCEF) IVPB (premix in dextrose) 2,000 mg 50 mL, Q8H, Last Rate: 2,000 mg (01/10/25 0531)    dextromethorphan-guaiFENesin (ROBITUSSIN DM) oral syrup 10 mL, TID    [Held by provider] enoxaparin (LOVENOX) subcutaneous injection 40 mg, Daily    HYDROmorphone (DILAUDID) injection 0.5 mg, Q2H PRN    hydrOXYzine HCL (ATARAX) tablet 25 mg, TID    lidocaine (LIDODERM) 5 % patch 1 patch, Daily    [Held by provider] lubiprostone (AMITIZA) capsule 8 mcg, BID     ondansetron (ZOFRAN) injection 4 mg, Q6H PRN    oxyCODONE (ROXICODONE) IR tablet 5 mg, Q6H PRN    pantoprazole (PROTONIX) EC tablet 40 mg, BID AC    phenol (CHLORASEPTIC) 1.4 % mucosal liquid 1 spray, Q2H PRN    pneumococcal 20-cheryl conj vacc (PREVNAR 20) IM Injection 0.5 mL, Once PRN    [Held by provider] polyethylene glycol (MIRALAX) packet 17 g, Daily PRN    prazosin (MINIPRESS) capsule 2 mg, HS    pregabalin (LYRICA) capsule 150 mg, TID    [Held by provider] senna-docusate sodium (SENOKOT S) 8.6-50 mg per tablet 1 tablet, BID    sertraline (ZOLOFT) tablet 200 mg, HS    traZODone (DESYREL) tablet 300 mg, HS PRN    Administrative Statements   Today, Patient Was Seen By: MERCY Lindsay      **Please Note: This note may have been constructed using a voice recognition system.**

## 2025-01-10 NOTE — ASSESSMENT & PLAN NOTE
Patient with CT scan concerning for SBO, however given patient's presentation this is more likely an ileus.  Patient has begun having bowel function, and has had complete resolution of her symptoms.  Labs appear to be within normal limits.  - Can proceed with orthopedic surgical procedure  - Keep NGT in place to suction  - Abdominal checks  - Follow-up lactate  - As needed pain and nausea control

## 2025-01-11 PROBLEM — K56.7 ILEUS (HCC): Status: ACTIVE | Noted: 2025-01-09

## 2025-01-11 LAB
ANION GAP SERPL CALCULATED.3IONS-SCNC: 8 MMOL/L (ref 4–13)
BASOPHILS # BLD AUTO: 0.01 THOUSANDS/ΜL (ref 0–0.1)
BASOPHILS NFR BLD AUTO: 0 % (ref 0–1)
BUN SERPL-MCNC: 15 MG/DL (ref 5–25)
CALCIUM SERPL-MCNC: 7.4 MG/DL (ref 8.4–10.2)
CHLORIDE SERPL-SCNC: 102 MMOL/L (ref 96–108)
CO2 SERPL-SCNC: 27 MMOL/L (ref 21–32)
CREAT SERPL-MCNC: 0.76 MG/DL (ref 0.6–1.3)
EOSINOPHIL # BLD AUTO: 0.23 THOUSAND/ΜL (ref 0–0.61)
EOSINOPHIL NFR BLD AUTO: 3 % (ref 0–6)
ERYTHROCYTE [DISTWIDTH] IN BLOOD BY AUTOMATED COUNT: 15.1 % (ref 11.6–15.1)
GFR SERPL CREATININE-BSD FRML MDRD: 84 ML/MIN/1.73SQ M
GLUCOSE SERPL-MCNC: 83 MG/DL (ref 65–140)
HCT VFR BLD AUTO: 26.3 % (ref 34.8–46.1)
HGB BLD-MCNC: 8.2 G/DL (ref 11.5–15.4)
IMM GRANULOCYTES # BLD AUTO: 0.07 THOUSAND/UL (ref 0–0.2)
IMM GRANULOCYTES NFR BLD AUTO: 1 % (ref 0–2)
LYMPHOCYTES # BLD AUTO: 1.78 THOUSANDS/ΜL (ref 0.6–4.47)
LYMPHOCYTES NFR BLD AUTO: 22 % (ref 14–44)
MCH RBC QN AUTO: 27.4 PG (ref 26.8–34.3)
MCHC RBC AUTO-ENTMCNC: 31.2 G/DL (ref 31.4–37.4)
MCV RBC AUTO: 88 FL (ref 82–98)
MONOCYTES # BLD AUTO: 0.65 THOUSAND/ΜL (ref 0.17–1.22)
MONOCYTES NFR BLD AUTO: 8 % (ref 4–12)
NEUTROPHILS # BLD AUTO: 5.21 THOUSANDS/ΜL (ref 1.85–7.62)
NEUTS SEG NFR BLD AUTO: 66 % (ref 43–75)
NRBC BLD AUTO-RTO: 0 /100 WBCS
PLATELET # BLD AUTO: 453 THOUSANDS/UL (ref 149–390)
PMV BLD AUTO: 8.9 FL (ref 8.9–12.7)
POTASSIUM SERPL-SCNC: 3 MMOL/L (ref 3.5–5.3)
RBC # BLD AUTO: 2.99 MILLION/UL (ref 3.81–5.12)
SODIUM SERPL-SCNC: 137 MMOL/L (ref 135–147)
WBC # BLD AUTO: 7.95 THOUSAND/UL (ref 4.31–10.16)

## 2025-01-11 PROCEDURE — NC001 PR NO CHARGE: Performed by: SURGERY

## 2025-01-11 PROCEDURE — 80048 BASIC METABOLIC PNL TOTAL CA: CPT

## 2025-01-11 PROCEDURE — 97163 PT EVAL HIGH COMPLEX 45 MIN: CPT

## 2025-01-11 PROCEDURE — 97167 OT EVAL HIGH COMPLEX 60 MIN: CPT

## 2025-01-11 PROCEDURE — 85025 COMPLETE CBC W/AUTO DIFF WBC: CPT

## 2025-01-11 PROCEDURE — 99232 SBSQ HOSP IP/OBS MODERATE 35: CPT | Performed by: NURSE PRACTITIONER

## 2025-01-11 PROCEDURE — NC001 PR NO CHARGE: Performed by: ORTHOPAEDIC SURGERY

## 2025-01-11 RX ORDER — OXYCODONE HYDROCHLORIDE 5 MG/1
5 TABLET ORAL EVERY 4 HOURS PRN
Refills: 0 | Status: DISCONTINUED | OUTPATIENT
Start: 2025-01-11 | End: 2025-01-15

## 2025-01-11 RX ORDER — METHOCARBAMOL 750 MG/1
750 TABLET, FILM COATED ORAL EVERY 6 HOURS SCHEDULED
Status: DISCONTINUED | OUTPATIENT
Start: 2025-01-11 | End: 2025-01-15

## 2025-01-11 RX ORDER — ACETAMINOPHEN 325 MG/1
975 TABLET ORAL EVERY 8 HOURS SCHEDULED
Status: DISCONTINUED | OUTPATIENT
Start: 2025-01-11 | End: 2025-01-15

## 2025-01-11 RX ORDER — GUAIFENESIN/DEXTROMETHORPHAN 100-10MG/5
10 SYRUP ORAL EVERY 4 HOURS PRN
Status: DISCONTINUED | OUTPATIENT
Start: 2025-01-11 | End: 2025-01-22 | Stop reason: HOSPADM

## 2025-01-11 RX ORDER — OXYCODONE HYDROCHLORIDE 10 MG/1
10 TABLET ORAL EVERY 4 HOURS PRN
Refills: 0 | Status: DISCONTINUED | OUTPATIENT
Start: 2025-01-11 | End: 2025-01-15

## 2025-01-11 RX ORDER — POTASSIUM CHLORIDE 1500 MG/1
40 TABLET, EXTENDED RELEASE ORAL 2 TIMES DAILY
Status: COMPLETED | OUTPATIENT
Start: 2025-01-11 | End: 2025-01-11

## 2025-01-11 RX ORDER — HYDROMORPHONE HCL/PF 1 MG/ML
0.5 SYRINGE (ML) INJECTION EVERY 4 HOURS PRN
Status: DISCONTINUED | OUTPATIENT
Start: 2025-01-11 | End: 2025-01-15

## 2025-01-11 RX ADMIN — ARIPIPRAZOLE 10 MG: 10 TABLET ORAL at 08:48

## 2025-01-11 RX ADMIN — BUSPIRONE HYDROCHLORIDE 15 MG: 10 TABLET ORAL at 21:17

## 2025-01-11 RX ADMIN — GUAIFENESIN AND DEXTROMETHORPHAN 10 ML: 100; 10 SYRUP ORAL at 08:45

## 2025-01-11 RX ADMIN — BUSPIRONE HYDROCHLORIDE 15 MG: 10 TABLET ORAL at 16:06

## 2025-01-11 RX ADMIN — HYDROMORPHONE HYDROCHLORIDE 0.5 MG: 1 INJECTION, SOLUTION INTRAMUSCULAR; INTRAVENOUS; SUBCUTANEOUS at 05:52

## 2025-01-11 RX ADMIN — ENOXAPARIN SODIUM 40 MG: 40 INJECTION SUBCUTANEOUS at 09:10

## 2025-01-11 RX ADMIN — BENZTROPINE MESYLATE 1 MG: 1 TABLET ORAL at 08:46

## 2025-01-11 RX ADMIN — PANTOPRAZOLE SODIUM 40 MG: 40 TABLET, DELAYED RELEASE ORAL at 05:52

## 2025-01-11 RX ADMIN — CEFAZOLIN SODIUM 2000 MG: 2 SOLUTION INTRAVENOUS at 16:06

## 2025-01-11 RX ADMIN — HYDROMORPHONE HYDROCHLORIDE 0.5 MG: 1 INJECTION, SOLUTION INTRAMUSCULAR; INTRAVENOUS; SUBCUTANEOUS at 08:44

## 2025-01-11 RX ADMIN — SERTRALINE HYDROCHLORIDE 200 MG: 100 TABLET ORAL at 21:16

## 2025-01-11 RX ADMIN — METHOCARBAMOL 1000 MG: 100 INJECTION INTRAMUSCULAR; INTRAVENOUS at 05:53

## 2025-01-11 RX ADMIN — ACETAMINOPHEN 975 MG: 325 TABLET, FILM COATED ORAL at 21:17

## 2025-01-11 RX ADMIN — PREGABALIN 150 MG: 75 CAPSULE ORAL at 08:45

## 2025-01-11 RX ADMIN — ACETAMINOPHEN 975 MG: 325 TABLET, FILM COATED ORAL at 14:28

## 2025-01-11 RX ADMIN — ACETAMINOPHEN 1000 MG: 10 INJECTION INTRAVENOUS at 08:27

## 2025-01-11 RX ADMIN — BUSPIRONE HYDROCHLORIDE 15 MG: 10 TABLET ORAL at 08:46

## 2025-01-11 RX ADMIN — HYDROXYZINE HYDROCHLORIDE 25 MG: 25 TABLET, FILM COATED ORAL at 16:06

## 2025-01-11 RX ADMIN — OXYCODONE HYDROCHLORIDE 10 MG: 10 TABLET ORAL at 11:25

## 2025-01-11 RX ADMIN — ATORVASTATIN CALCIUM 40 MG: 40 TABLET, FILM COATED ORAL at 16:06

## 2025-01-11 RX ADMIN — POTASSIUM CHLORIDE 40 MEQ: 1500 TABLET, EXTENDED RELEASE ORAL at 17:05

## 2025-01-11 RX ADMIN — BENZTROPINE MESYLATE 1 MG: 1 TABLET ORAL at 17:05

## 2025-01-11 RX ADMIN — POTASSIUM CHLORIDE 40 MEQ: 1500 TABLET, EXTENDED RELEASE ORAL at 11:25

## 2025-01-11 RX ADMIN — HYDROXYZINE HYDROCHLORIDE 25 MG: 25 TABLET, FILM COATED ORAL at 08:45

## 2025-01-11 RX ADMIN — METHOCARBAMOL 750 MG: 750 TABLET ORAL at 11:25

## 2025-01-11 RX ADMIN — AMLODIPINE BESYLATE 5 MG: 5 TABLET ORAL at 08:48

## 2025-01-11 RX ADMIN — OXYCODONE HYDROCHLORIDE 10 MG: 10 TABLET ORAL at 21:16

## 2025-01-11 RX ADMIN — HYDROXYZINE HYDROCHLORIDE 25 MG: 25 TABLET, FILM COATED ORAL at 21:16

## 2025-01-11 RX ADMIN — METHOCARBAMOL 750 MG: 750 TABLET ORAL at 17:05

## 2025-01-11 RX ADMIN — LIDOCAINE 1 PATCH: 700 PATCH TOPICAL at 21:17

## 2025-01-11 RX ADMIN — CEFAZOLIN SODIUM 2000 MG: 2 SOLUTION INTRAVENOUS at 08:45

## 2025-01-11 RX ADMIN — ASPIRIN 81 MG: 81 TABLET, COATED ORAL at 08:45

## 2025-01-11 RX ADMIN — PREGABALIN 150 MG: 75 CAPSULE ORAL at 16:06

## 2025-01-11 RX ADMIN — BENZONATATE 100 MG: 100 CAPSULE ORAL at 08:46

## 2025-01-11 RX ADMIN — PANTOPRAZOLE SODIUM 40 MG: 40 TABLET, DELAYED RELEASE ORAL at 16:06

## 2025-01-11 RX ADMIN — CEFAZOLIN SODIUM 2000 MG: 2 SOLUTION INTRAVENOUS at 00:56

## 2025-01-11 RX ADMIN — PREGABALIN 150 MG: 75 CAPSULE ORAL at 21:16

## 2025-01-11 NOTE — PLAN OF CARE
Problem: OCCUPATIONAL THERAPY ADULT  Goal: Performs self-care activities at highest level of function for planned discharge setting.  See evaluation for individualized goals.  Description: Treatment Interventions: ADL retraining, Functional transfer training, Endurance training, Cognitive reorientation, Patient/family training, Equipment evaluation/education, Compensatory technique education, Energy conservation, Activityengagement          See flowsheet documentation for full assessment, interventions and recommendations.   Note: Limitation: Decreased ADL status, Decreased Safe judgement during ADL, Decreased cognition, Decreased endurance, Decreased self-care trans, Decreased high-level ADLs  Prognosis: Good  Assessment: 60 YO Female SEEN FOR INITIAL OCCUPATIONAL THERAPY EVALUATION FOLLOWING TXF FROM [facility]->[facility] WITH AMBULATORY DYSFUNCTION FOLLOWING RECENT D/C FROM INPT REHAB FOLLOWING INITIAL BACK SX IN 12/24. DX INCLUDES ILEUS, UTI, RSV AND WOUND DEHISCENCE. PT TRANSFERRED TO [facility] FOR I&D OF LUMBAR SPINE WITH ORTHO. PT CURRENTLY HAS THE FOLLOWING RESTRICTIONS;SPINAL PRECAUTIONS and LSO . PROBLEMS LIST/PMH INCLUDES Anxiety, Arthritis,Bipolar 2 disorder (HCC), Depression, Dysphagia, Familial tremor, Fibromyalgia,  Hearing aid worn, [place] (hard of hearing), HLD, HTN, Impaired speech, Left-sided weakness, Lumbar disc disease with radiculopathy, Memory loss of unknown cause, Migraine, Multiple closed fractures of metatarsal bone of right foot, Obesity, Osteoarthritis of both hips, Osteoarthritis of knee, Overactive bladder, Panic attack, Patellofemoral disorder of both knees, Post traumatic stress disorder, Primary localized osteoarthritis of both knees, S/P insertion of spinal cord stimulator, S/P total knee arthroplasty, right, Sacroiliitis (HCC), Seizure-like activity (HCC), Seizures (HCC), Small bowel obstruction (HCC), Status post total knee replacement, left, Stroke (HCC), Tardive dyskinesia, Thrombosis of cerebral  arteries, Urinary incontinence. PT IS FROM HOME WITH FAMILY WHERE SHE REPORTS REQUIRING ADDITIONAL ASSIST UPON RETURNING HOME AND STATES FAMILY IS UNABLE TO PROVIDE. PT CURRENTLY REQUIRES OVERALL MOD-MAX A WITH ADLS AND MOD A WITH TRANSFERS / FUNCTIONAL MOBILITY WITH USE OF RW. PT IS LIMITED 2' PAIN, FATIGUE, IMPAIRED BALANCE, FALL RISK , SPINAL PRECAUTIONS, OVERALL WEAKNESS/DECONDITIONING , LIMITED FAMILY/FRIEND SUPPORT , INACCESSIBLE HOME ENVIRONMENT, and OVERALL LIMITED ACTIVITY TOLERANCE. PT EDUCATED ON SPINAL PRECAUTIONS, LSO MANAGEMENT, LOG ROLL TECHNIQUE, DEEP BREATHING TECHNIQUES T/O ACTIVITY, SLOWING OF PACE, ENERGY CONSERVATION TECHNIQUES FOR CARRY OVER UPON D/C, INCREASED FAMILY SUPPORT, and CONTINUE PARTICIPATION IN SELF-CARE/MOBILITY WITH STAFF WHILE IN THE HOSPITAL . The patient's raw score on the AM-PAC Daily Activity Inpatient Short Form is 14. A raw score of less than 19 suggests the patient may benefit from discharge to post-acute rehabilitation services. Please refer to the recommendation of the Occupational Therapist for safe discharge planning. FROM AN OCCUPATIONAL THERAPY PERSPECTIVE, RECOMMEND LEVEL II RESOURCES UPON D/C. WILL CONT TO FOLLOW TO ADDRESS THE BELOW DESCRIBED GOALS.     Rehab Resource Intensity Level, OT: II (Moderate Resource Intensity)

## 2025-01-11 NOTE — ASSESSMENT & PLAN NOTE
1/3 RSV positive  Chest x-ray without acute findings  Completed 5 days of steroid therapy  Symptoms resolved

## 2025-01-11 NOTE — ASSESSMENT & PLAN NOTE
Noted to have worsening epigastric sharp pain that started 1/8 overnight with an episode of vomiting  XR abdomen shows severely dilated loops concerning for SBO or ileus  CT A/P shows dilated loops of small bowel suggestive of underlying SBO with transition point in the central abdomen where there is slight swirling of the mesenteric vessel and underlying internal hernia cannot be fully excluded.  Fluid seen in the colon suggestive of diarrheal illness without colon wall thickening to suggest colitis.  Cholelithiasis.  Small ascites.  Previously noted neurostimulator leads have been removed in the interim, does appear to have small retained fragment in the posterior lower back.  Ill-defined fluid in the pocket of the soft tissue gas and surrounding inflammatory stranding in the midline low back suspicious for abscess.  NG tube was placed, was evaluated by general surgery.  Bowel function returned prior to the OR, was cleared for OR for I&D.  NG tube is now out, tolerating p.o.  Abdominal exam benign.  Monitor

## 2025-01-11 NOTE — ASSESSMENT & PLAN NOTE
Initially presented to [facility] 1/2 with ambulatory dysfunction.  Recently discharged from rehab following recent back surgery in December 2024.  On admission to [place], patient met criteria and was diagnosed with UTI and RSV.  Patient also noted to have small area of wound dehiscence at the distal aspect of the wound from recent surgical hardware removal.  CT was ordered which showed ill-defined fluid with pockets of soft tissue gas and surrounding inflammatory stranding in the midline lower back suspicious for an abscess therefore patient was transferred to [place] for orthopedic evaluation.  Recent blood cultures 1/3 negative  S/P I&D lumbar spine 1/10 with ortho  Drain in place  Cultures pending  Continue IV Ancef for now  Consult ID  Pain control per APS recommendations: ATC APAP, lido patch, scheduled Robaxin, as needed oxycodone with IV Dilaudid for breakthrough.

## 2025-01-11 NOTE — ASSESSMENT & PLAN NOTE
S/p removal hardware L2-S1, posterior fusion L5-S1, revision instrumentation L2-pelvis, bilateral S2 alar screws with orthopedic surgery on 12/19. Now dehiscence and concern for abscess as above.  Status post I&D as above  Pain control as above

## 2025-01-11 NOTE — ASSESSMENT & PLAN NOTE
Multiple short courses of opioids noted in history.  APS following for assistance with pain management, their recommendations are appreciated.

## 2025-01-11 NOTE — OCCUPATIONAL THERAPY NOTE
Occupational Therapy Evaluation     Patient Name: Samantha Rodriguez  Today's Date: 1/11/2025  Problem List  Principal Problem:    Wound dehiscence  Active Problems:    Chronic pain disorder    Lumbar radiculopathy    Bipolar disorder (HCC)    GERD without esophagitis    Essential hypertension    Tardive dyskinesia    Ambulatory dysfunction    History of CVA (cerebrovascular accident)    Anemia    RSV bronchitis    Ileus (HCC)    History of UTI    Past Medical History  Past Medical History:   Diagnosis Date    Anxiety     Arthritis     left knee    Arthritis of right knee 10/06/2020    At risk for falls     Bipolar 2 disorder (HCC)     FOLLOWS WITH PSYCHIATRIST. CONTINUE LAMOTRIGINE; RESOLVED: [id number]    Depression     Dysphagia     per [facility] facility paperwork    Familial tremor     both hands    Fibromyalgia     LAST ASSESSED: 08DEC2017    GERD (gastroesophageal reflux disease)     Hearing aid worn     left ear    [place] (hard of hearing)     left ear    Hyperlipidemia     Hypertension     Impaired speech     Left-sided weakness     Lumbar disc disease with radiculopathy 02/02/2018    Memory loss of unknown cause     long and short term    Migraine     Multiple closed fractures of metatarsal bone of right foot 05/02/2021    Obesity     Obesity, Class II, BMI 35-39.9     Osteoarthritis of both hips 10/31/2016    Osteoarthritis of knee 02/20/2013    Description: Continue Tylenol and Naproxen. Encourage exercise and weight loss. Patient refused physical therapy. I will refer the patient back to Orthopedics.    Overactive bladder     Panic attack     Patellofemoral disorder of both knees 05/01/2020    Post traumatic stress disorder     Primary localized osteoarthritis of both knees 06/16/2017    Primary osteoarthritis of both knees 05/01/2020    S/P insertion of spinal cord stimulator     no remote    S/P total knee arthroplasty, right 03/10/2022    Sacroiliitis (HCC) 02/28/2017    Seasonal allergies     Seizure-like  activity (HCC) 2022    Seizures (HCC)     possible seizure like activity    Small bowel obstruction (HCC) 2023    Status post total knee replacement, left 2022    Stroke ([facility])     questionable stroke     Tardive dyskinesia     PATIENT STATES    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  CONT ASA 81 MG DAILY; RESOLVED: 2015    Urinary incontinence     Uses walker     Wears dentures     partial lower / full upper- doesnt wear    Wears glasses      Past Surgical History  Past Surgical History:   Procedure Laterality Date    BACK SURGERY       SECTION      COLONOSCOPY      RESOLVED: 2016    EAR SURGERY      EGD      HYSTERECTOMY      LUMBAR FUSION N/A 2024    Procedure: Removal hardware L2-S1, posterior fusion L5-S1, navigated revision instrumentation L2 to pelvis; application of bilateral S2 alar screws and bone grafting of the disc at L5-S1;  Surgeon: Yenni Stevens MD;  Location: [facility];  Service: Orthopedics    MYRINGOTOMY W/ TUBES Left     NECK SURGERY  2019    HI ARTHRP KNE CONDYLE&PLATU MEDIAL&LAT COMPARTMENTS Right 2022    Procedure: ARTHROPLASTY KNEE TOTAL;  Surgeon: Chandni Tuttle DO;  [facility];  Service: Orthopedics    HI ARTHRP KNE CONDYLE&PLATU MEDIAL&LAT COMPARTMENTS Left 2022    Procedure: ARTHROPLASTY KNEE TOTAL;  Surgeon: Chandni Tuttle DO;  [facility];  Service: Orthopedics    HI CYSTOURETHROSCOPY N/A 2016    Procedure: CYSTOSCOPY, BOTOX INJECTION;  Surgeon: Abraham Teague MD;  [facility];  Service: Gynecology    HI INSJ/RPLCMT SPINAL NPG/RCVR POCKET CRTJ&CONNJ Right 02/10/2021    Procedure: REPLACEMENT IMPLANTABLE PULSE GENERATOR DORSAL SPINAL COLUMN STIMULATOR, RIGHT;  Surgeon: Carlos Alberto Jacome MD;  Location: [facility];  Service: Neurosurgery    HI PRQ IMPLTJ NSTIM ELECTRODE ARRAY EPIDURAL Right 2020    Procedure: INSERTION THORACIC DORSAL COLUMN  "SPINAL CORD STIMULATOR PERCUTANEOUS W IMPLANTABLE PULSE GENERATOR, RIGHT;  Surgeon: Carlos Alberto Jacome MD;  Location: [facility];  Service: Neurosurgery    TONSILLECTOMY      TUBAL LIGATION  1986    UPPER GASTROINTESTINAL ENDOSCOPY  09/2020 01/11/25 1155   OT Last Visit   OT Visit Date 01/11/25   Note Type   Note type Evaluation   Pain Assessment   Pain Assessment Tool 0-10   Pain Score 8   Pain Location/Orientation Location: Back   Patient's Stated Pain Goal No pain   Hospital Pain Intervention(s) Repositioned;Ambulation/increased activity;Emotional support   Restrictions/Precautions   Weight Bearing Precautions Per Order No   Braces or Orthoses (S)  LSO   Other Precautions Cognitive;Chair Alarm;Bed Alarm;Multiple lines;Fall Risk;Pain;Spinal precautions  (2 SUSAN DRAINS)   Home Living   Type of Home House   Home Layout Two level;Able to live on main level with bedroom/bathroom;Stairs to enter with rails   Home Equipment Walker  (ROLLATOR)   Additional Comments PT REPORTS RECENT D/C FROM INPT REHAB TO DAUGHTER'S HOUSE WITH DIFFICULTY MANAGING   Prior Function   Level of [place] Needs assistance with ADLs;Needs assistance with IADLS   Lives With Daughter;Family   Receives Help From Family   IADLs Family/Friend/Other provides transportation;Family/Friend/Other provides meals;Family/Friend/Other provides medication management   Falls in the last 6 months 0   Vocational Retired   Lifestyle   Autonomy A with ADLs and functional mobility with rollator   Reciprocal Relationships LIVES WITH DAUGHTER, CLARENCE AND GRANDCHILDREN. PT REPORTS DAUGHTER \"CAN'T TAKE CARE OF ME\"   Service to Others Retired   Intrinsic Gratification ENJOYS SPENDING TIME WITH FAMILY.   ADL   Eating Assistance 5  Supervision/Setup   Grooming Assistance 4  Minimal Assistance   UB Bathing Assistance 3  Moderate Assistance   LB Bathing Assistance 2  Maximal Assistance   UB Dressing Assistance 3  Moderate Assistance   LB Dressing Assistance 2  Maximal " Assistance   Toileting Assistance  2  Maximal Assistance   Functional Assistance 3  Moderate Assistance   Bed Mobility   Rolling L 3  Moderate assistance   Additional items Assist x 1;Increased time required;Verbal cues;LE management   Supine to Sit 3  Moderate assistance   Additional items Assist x 1;Increased time required;Verbal cues;LE management   Sit to Supine Unable to assess   Additional Comments PT LEFT OOB WITH ALL NEEDS IN REACH + CHAIR ALARM ACTIVATED.   Transfers   Sit to Stand 3  Moderate assistance   Additional items Assist x 1;Increased time required;Verbal cues   Stand to Sit 3  Moderate assistance   Additional items Assist x 1;Increased time required;Verbal cues   Functional Mobility   Functional Mobility 3  Moderate assistance   Additional items Rolling walker   Balance   Static Sitting Fair   Static Standing Poor +   Ambulatory Poor   Activity Tolerance   Activity Tolerance Patient limited by fatigue;Patient limited by pain   Medical Staff Made Aware PT SEEN FOR CO-EVAL WITH SKILLED PHYSICAL THERAPIST 2' NEW PRECAUTIONS/LIMITATIONS, AND LIMITED ACTIVITY TOLERANCE WHICH IMPACT PERFORMANCE AND ARE A REGRESSION FROM PT'S BASELINE.   Nurse Made Aware APPROPRIATE TO SEE PER RN.   RUE Assessment   RUE Assessment WFL   LUE Assessment   LUE Assessment WFL   Hand Function   Gross Motor Coordination Functional   Fine Motor Coordination Functional   Psychosocial   Psychosocial (WDL) WDL   Cognition   Arousal/Participation Alert;Cooperative   Attention Attends with cues to redirect   Orientation Level Oriented X4   Memory Decreased recall of precautions;Decreased short term memory   Following Commands Follows multistep commands without difficulty   Comments PT IS PLEASANT AND COOPERATIVE. CUES FOR RECALL OF LEARNED PRECAUTIONS/ LSO MANAGEMENT. ALARM ON FOR SAFETY   Assessment   Limitation Decreased ADL status;Decreased Safe judgement during ADL;Decreased cognition;Decreased endurance;Decreased self-care  trans;Decreased high-level ADLs   Prognosis Good   Assessment 58 YO Female SEEN FOR INITIAL OCCUPATIONAL THERAPY EVALUATION FOLLOWING TXF FROM [facility]->B WITH AMBULATORY DYSFUNCTION FOLLOWING RECENT D/C FROM INPT REHAB FOLLOWING INITIAL BACK SX IN 12/24. DX INCLUDES ILEUS, UTI, RSV AND WOUND DEHISCENCE. PT TRANSFERRED TO [facility] FOR I&D OF LUMBAR SPINE WITH ORTHO. PT CURRENTLY HAS THE FOLLOWING RESTRICTIONS;SPINAL PRECAUTIONS and LSO . PROBLEMS LIST/PMH INCLUDES Anxiety, Arthritis,Bipolar 2 disorder (HCC), Depression, Dysphagia, Familial tremor, Fibromyalgia,  Hearing aid worn, [place] (hard of hearing), HLD, HTN, Impaired speech, Left-sided weakness, Lumbar disc disease with radiculopathy, Memory loss of unknown cause, Migraine, Multiple closed fractures of metatarsal bone of right foot, Obesity, Osteoarthritis of both hips, Osteoarthritis of knee, Overactive bladder, Panic attack, Patellofemoral disorder of both knees, Post traumatic stress disorder, Primary localized osteoarthritis of both knees, S/P insertion of spinal cord stimulator, S/P total knee arthroplasty, right, Sacroiliitis (HCC), Seizure-like activity (HCC), Seizures (HCC), Small bowel obstruction (HCC), Status post total knee replacement, left, Stroke (HCC), Tardive dyskinesia, Thrombosis of cerebral arteries, Urinary incontinence. PT IS FROM HOME WITH FAMILY WHERE SHE REPORTS REQUIRING ADDITIONAL ASSIST UPON RETURNING HOME AND STATES FAMILY IS UNABLE TO PROVIDE. PT CURRENTLY REQUIRES OVERALL MOD-MAX A WITH ADLS AND MOD A WITH TRANSFERS / FUNCTIONAL MOBILITY WITH USE OF RW. PT IS LIMITED 2' PAIN, FATIGUE, IMPAIRED BALANCE, FALL RISK , SPINAL PRECAUTIONS, OVERALL WEAKNESS/DECONDITIONING , LIMITED FAMILY/FRIEND SUPPORT , INACCESSIBLE HOME ENVIRONMENT, and OVERALL LIMITED ACTIVITY TOLERANCE. PT EDUCATED ON SPINAL PRECAUTIONS, LSO MANAGEMENT, LOG ROLL TECHNIQUE, DEEP BREATHING TECHNIQUES T/O ACTIVITY, SLOWING OF PACE, ENERGY CONSERVATION TECHNIQUES FOR CARRY OVER  UPON D/C, INCREASED FAMILY SUPPORT, and CONTINUE PARTICIPATION IN SELF-CARE/MOBILITY WITH STAFF WHILE IN THE HOSPITAL . The patient's raw score on the AM-PAC Daily Activity Inpatient Short Form is 14. A raw score of less than 19 suggests the patient may benefit from discharge to post-acute rehabilitation services. Please refer to the recommendation of the Occupational Therapist for safe discharge planning. FROM AN OCCUPATIONAL THERAPY PERSPECTIVE, RECOMMEND LEVEL II RESOURCES UPON D/C. WILL CONT TO FOLLOW TO ADDRESS THE BELOW DESCRIBED GOALS.   Goals   Patient Goals TO RETURN TO REHAB   LTG Time Frame 10-14   Long Term Goal #1 SEE BELOW   Plan   Treatment Interventions ADL retraining;Functional transfer training;Endurance training;Cognitive reorientation;Patient/family training;Equipment evaluation/education;Compensatory technique education;Energy conservation;Activityengagement   Goal Expiration Date 01/25/25   OT Frequency 2-3x/wk   Discharge Recommendation   Rehab Resource Intensity Level, OT II (Moderate Resource Intensity)   AM-PAC Daily Activity Inpatient   Lower Body Dressing 2   Bathing 2   Toileting 2   Upper Body Dressing 2   Grooming 3   Eating 3   Daily Activity Raw Score 14   Daily Activity Standardized Score (Calc for Raw Score >=11) 33.39   AM-PAC Applied Cognition Inpatient   Following a Speech/Presentation 3   Understanding Ordinary Conversation 4   Taking Medications 3   Remembering Where Things Are Placed or Put Away 3   Remembering List of 4-5 Errands 3   Taking Care of Complicated Tasks 3   Applied Cognition Raw Score 19   Applied Cognition Standardized Score 39.77       OCCUPATIONAL THERAPY GOALS TO BE MET WITHIN 14 DAYS:    -Pt will increase bed mobility to S to participate in functional activities with G tolerance and balance.  -Pt will improve functional mobility and transfers to S on/off all surfaces w/ G balance/safety including toileting.  -Pt will participate in lt grooming task with S  after set-up standing at sink ~3-5 minutes with G safety and balance.   -Pt will increase independence in all ADLS to S with G balance sitting upright in chair.  -Pt will improve activity tolerance to G for 20 min txment sessions w/ G carry over of learned energy conservation techniques.  -Pt will demonstrate G carryover of learned SPINAL PRECAUTIONS, safety techniques and proper body mechanics in functional and leisure activities with use of DME.  -Pt will complete additional cognitive assessment with 100% attention to task in order to assist with safe d/c plan.       Documentation completed by BAMBI Bledsoe, OTR/L  [facility] Certified ID# [id number]

## 2025-01-11 NOTE — PLAN OF CARE
Problem: PAIN - ADULT  Goal: Verbalizes/displays adequate comfort level or baseline comfort level  Description: Interventions:  - Encourage patient to monitor pain and request assistance  - Assess pain using appropriate pain scale  - Administer analgesics based on type and severity of pain and evaluate response  - Implement non-pharmacological measures as appropriate and evaluate response  - Consider cultural and social influences on pain and pain management  - Notify physician/advanced practitioner if interventions unsuccessful or patient reports new pain  Outcome: Progressing     Problem: INFECTION - ADULT  Goal: Absence or prevention of progression during hospitalization  Description: INTERVENTIONS:  - Assess and monitor for signs and symptoms of infection  - Monitor lab/diagnostic results  - Monitor all insertion sites, i.e. indwelling lines, tubes, and drains  - Monitor endotracheal if appropriate and nasal secretions for changes in amount and color  - [place] appropriate cooling/warming therapies per order  - Administer medications as ordered  - Instruct and encourage patient and family to use good hand hygiene technique  - Identify and instruct in appropriate isolation precautions for identified infection/condition  Outcome: Progressing

## 2025-01-11 NOTE — PHYSICAL THERAPY NOTE
Physical Therapy Evaluation     Patient's Name: Samantha Rodriguez    Admitting Diagnosis  Wound dehiscence [T81.30XA]    Problem List  Patient Active Problem List   Diagnosis    Chronic pain disorder    Lumbar radiculopathy    Lumbar spondylosis    Fibromyalgia    Spinal stenosis of lumbar region with neurogenic claudication    Bipolar disorder ([facility])    Cognitive disorder    GERD without esophagitis    Generalized anxiety disorder with panic attacks    Essential hypertension    Mood insomnia ([facility])    Migraine    Overactive bladder    PTSD (post-traumatic stress disorder)    Tremor    Urinary incontinence    Vitamin D deficiency    Post laminectomy syndrome    Obesity, morbid ([facility]ly [facility])    MIMI (obstructive sleep apnea)    SIRS (systemic inflammatory response syndrome) ([facility]ly [facility])    Tardive dyskinesia    Status post insertion of spinal cord stimulator    Ambulatory dysfunction    Dysphagia    History of CVA (cerebrovascular accident)    Mixed hyperlipidemia    CVA (cerebral vascular accident) ([facility])    Slow transit constipation    Hypokalemia    Anemia    Hemiplegia, post-stroke ([facility]ly [facility])    Small bowel obstruction ([facility])    Memory loss    Numbness    Bipolar I disorder, most recent episode depressed ([facility])    Panic disorder without agoraphobia    Chronic low back pain, unspecified back pain laterality, unspecified whether sciatica present    Opioid-induced constipation    Weight gain    Severe episode of recurrent major depressive disorder, without psychotic features ([facility])    Acute blood loss anemia    Pain associated with surgical procedure    Toxic encephalopathy    Wound of sacral region    RSV bronchitis    Urinary tract infection    Wound dehiscence    Ileus ([facility])    History of UTI    Volume overload       Past Medical History  Past Medical History:   Diagnosis Date    Anxiety     Arthritis     left knee    Arthritis of right knee 10/06/2020    At risk for falls     Bipolar 2 disorder ([facility])     FOLLOWS WITH PSYCHIATRIST. CONTINUE  LAMOTRIGINE; RESOLVED: 2016    Depression     Dysphagia     per [facility] facility paperwork    Familial tremor     both hands    Fibromyalgia     LAST ASSESSED: [id number]    GERD (gastroesophageal reflux disease)     Hearing aid worn     left ear    [place] (hard of hearing)     left ear    Hyperlipidemia     Hypertension     Impaired speech     Left-sided weakness     Lumbar disc disease with radiculopathy 2018    Memory loss of unknown cause     long and short term    Migraine     Multiple closed fractures of metatarsal bone of right foot 2021    Obesity     Obesity, Class II, BMI 35-39.9     Osteoarthritis of both hips 10/31/2016    Osteoarthritis of knee 2013    Description: Continue Tylenol and Naproxen. Encourage exercise and weight loss. Patient refused physical therapy. I will refer the patient back to Orthopedics.    Overactive bladder     Panic attack     Patellofemoral disorder of both knees 2020    Post traumatic stress disorder     Primary localized osteoarthritis of both knees 2017    Primary osteoarthritis of both knees 2020    S/P insertion of spinal cord stimulator     no remote    S/P total knee arthroplasty, right 03/10/2022    Sacroiliitis (HCC) 2017    Seasonal allergies     Seizure-like activity (HCC) 2022    Seizures (HCC)     possible seizure like activity    Small bowel obstruction (HCC) 2023    Status post total knee replacement, left 2022    Stroke ([facility])     questionable stroke 2009    Tardive dyskinesia     PATIENT STATES    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  CONT ASA 81 MG DAILY; RESOLVED: [id number]    Urinary incontinence     Uses walker     Wears dentures     partial lower / full upper- doesnt wear    Wears glasses        Past Surgical History  Past Surgical History:   Procedure Laterality Date    BACK SURGERY       SECTION      COLONOSCOPY      RESOLVED: 2016    EAR SURGERY      EGD       HYSTERECTOMY  2004    LUMBAR FUSION N/A 12/19/2024    Procedure: Removal hardware L2-S1, posterior fusion L5-S1, navigated revision instrumentation L2 to pelvis; application of bilateral S2 alar screws and bone grafting of the disc at L5-S1;  Surgeon: Yenni Stevens MD;  Location: [facility];  Service: Orthopedics    MYRINGOTOMY W/ TUBES Left     NECK SURGERY  04/2019    KS ARTHRP KNE CONDYLE&PLATU MEDIAL&LAT COMPARTMENTS Right 03/09/2022    Procedure: ARTHROPLASTY KNEE TOTAL;  Surgeon: Chandni Tuttle DO;  Location[facility];  Service: Orthopedics    KS ARTHRP KNE CONDYLE&PLATU MEDIAL&LAT COMPARTMENTS Left 07/05/2022    Procedure: ARTHROPLASTY KNEE TOTAL;  Surgeon: Chandni Tuttle DO;  [facility];  Service: Orthopedics    KS CYSTOURETHROSCOPY N/A 02/18/2016    Procedure: CYSTOSCOPY, BOTOX INJECTION;  Surgeon: Abraham Teauge MD;  [facility];  Service: Gynecology    KS INSJ/RPLCMT SPINAL NPG/RCVR POCKET CRTJ&CONNJ Right 02/10/2021    Procedure: REPLACEMENT IMPLANTABLE PULSE GENERATOR DORSAL SPINAL COLUMN STIMULATOR, RIGHT;  Surgeon: Carlos Alberto Jacome MD;  Location: [facility];  Service: Neurosurgery    KS PRQ IMPLTJ NSTIM ELECTRODE ARRAY EPIDURAL Right 07/28/2020    Procedure: INSERTION THORACIC DORSAL COLUMN SPINAL CORD STIMULATOR PERCUTANEOUS W IMPLANTABLE PULSE GENERATOR, RIGHT;  Surgeon: Carlos Alberto Jacome MD;  Location[facility];  Service: Neurosurgery    TONSILLECTOMY      TUBAL LIGATION  1986    UPPER GASTROINTESTINAL ENDOSCOPY  09/2020 01/11/25 1156   PT Last Visit   PT Visit Date 01/11/25   Note Type   Note type Evaluation   Pain Assessment   Pain Assessment Tool 0-10   Pain Score 8   Pain Location/Orientation Location: Back   Patient's Stated Pain Goal No pain   Hospital Pain Intervention(s) Repositioned;Ambulation/increased activity   Restrictions/Precautions   Weight Bearing Precautions Per Order No   Braces or Orthoses (S)  LSO   Other Precautions Cognitive;Chair  Alarm;Bed Alarm;Multiple lines;Telemetry;Fall Risk;Pain   Home Living   Type of Home House   Home Layout Two level;Able to live on main level with bedroom/bathroom   Home Equipment Walker  (rollator)   Prior Function   Level of [place] Needs assistance with functional mobility   Lives With Daughter;Family   Receives Help From Family   Vocational Retired   General   Family/Caregiver Present No   Cognition   Orientation Level Oriented X4   Subjective   Subjective Pt willing and agreeable to PT session   Coordination   Movements are Fluid and Coordinated 0   Coordination and Movement Description slow and guarded   Bed Mobility   Rolling L 3  Moderate assistance   Additional items Assist x 1;Increased time required   Supine to Sit 3  Moderate assistance   Additional items Assist x 1;Increased time required   Sit to Supine Unable to assess   Additional Comments Pt left sitting in chair, call bell in reach, chair alarm active   Transfers   Sit to Stand 3  Moderate assistance   Additional items Assist x 1;Increased time required   Stand to Sit 3  Moderate assistance   Additional items Assist x 1;Increased time required   Ambulation/Elevation   Gait pattern Excessively slow;Ataxia;Short stride;Shuffling;Decreased foot clearance   Gait Assistance 4  Minimal assist   Additional items Assist x 1   Assistive Device Rolling walker   Distance 6+6   Balance   Static Sitting Fair   Static Standing Poor +   Ambulatory Poor   Endurance Deficit   Endurance Deficit Yes   Endurance Deficit Description pain   Activity Tolerance   Activity Tolerance Patient limited by fatigue;Patient limited by pain   Medical Staff Made Aware OT for D/C planning   Nurse Made Aware yes, nsg gave clearance to work with pt   Assessment   Prognosis Fair   Problem List Decreased strength;Decreased endurance;Decreased range of motion;Impaired balance;Decreased mobility;Decreased coordination;Decreased cognition;Decreased safety awareness;Pain   Assessment  Pt is 59 y.o. female seen for PT evaluation s/p admit to [facility] on 1/9/2025 w/ Wound dehiscence. PT consulted to assess pt's functional mobility and d/c needs. Order placed for PT eval and tx, w/ up w/ A order. Comorbidities affecting pt's physical performance at time of assessment include:  has a past medical history of Anxiety, Arthritis, Arthritis of right knee, At risk for falls, Bipolar 2 disorder (HCC), Depression, Dysphagia, Familial tremor, Fibromyalgia, GERD (gastroesophageal reflux disease), Hearing aid worn, [place] (hard of hearing), Hyperlipidemia, Hypertension, Impaired speech, Left-sided weakness, Lumbar disc disease with radiculopathy, Memory loss of unknown cause, Migraine, Multiple closed fractures of metatarsal bone of right foot, Obesity, Obesity, Class II, BMI 35-39.9, Osteoarthritis of both hips, Osteoarthritis of knee, Overactive bladder, Panic attack, Patellofemoral disorder of both knees, Post traumatic stress disorder, Primary localized osteoarthritis of both knees, Primary osteoarthritis of both knees, S/P insertion of spinal cord stimulator, S/P total knee arthroplasty, right, Sacroiliitis (HCC), Seasonal allergies, Seizure-like activity (HCC), Seizures (HCC), Small bowel obstruction (HCC), Status post total knee replacement, left, Stroke (HCC), Tardive dyskinesia, Thrombosis of cerebral arteries, Urinary incontinence, Uses walker, Wears dentures, and Wears glasses. PTA, pt was ambulates household distances, lives in multi-level home, and was recently at rehab. Personal factors affecting pt at time of IE include: stairs to enter home, limited home support, impulsivity, unable to perform physical activity, limited insight into impairments, inability to perform IADLs, and inability to perform ADLs. Please find objective findings from PT assessment regarding body systems outlined above with impairments and limitations including weakness, impaired balance, decreased endurance,  impaired coordination, gait deviations, pain, decreased activity tolerance, decreased functional mobility tolerance, decreased safety awareness, and fall risk. The following objective measures performed on IE also reveal limitations: The patient's AM-PAC Basic Mobility Inpatient Short Form Raw Score is 17, Standardized Score is 39.67. A standardized score less than 42.9 suggests the patient may benefit from discharge to post-acute rehabilitation services. Please also refer to the recommendation of the Physical Therapist for safe discharge planning. Pt's clinical presentation is currently unstable/unpredictable seen in pt's presentation of ongoing medical workup. Pt to benefit from continued PT tx to address deficits as defined above and maximize level of functional independent mobility and consistency. From PT/mobility standpoint, recommendation at time of d/c would be level II pending progress in order to facilitate return to PLOF.   Goals   Patient Goals To go home   STG Expiration Date 01/23/25   Short Term Goal #1 1. Complete bed mobility and transfers I to decrease need for caregiver in home. 2. Ambulate 300' I to complete household and community mobility without A. 3. Improve dynamic balance to good to decrease need for UE support during ambulation. 4. Be educated & demonstate 12 steps to be able to enter home without A.   Plan   Treatment/Interventions Spoke to case management;OT;Spoke to nursing;Gait training;Bed mobility;Patient/family training;Endurance training;LE strengthening/ROM;Functional transfer training   PT Frequency 3-5x/wk   Discharge Recommendation   Rehab Resource Intensity Level, PT II (Moderate Resource Intensity)   AM-PAC Basic Mobility Inpatient   Turning in Flat Bed Without Bedrails 3   Lying on Back to Sitting on Edge of Flat Bed Without Bedrails 3   Moving Bed to Chair 3   Standing Up From Chair Using Arms 3   Walk in Room 3   Climb 3-5 Stairs With Railing 2   Basic Mobility Inpatient  Raw Score 17   Basic Mobility Standardized Score 39.67   [facility] Highest Level Of Mobility   -[facility] Goal 5: Stand one or more mins   -HL Achieved 5: Stand (1 or more minutes)           Karin Thapa, PT

## 2025-01-11 NOTE — PROGRESS NOTES
Progress Note - Surgery-General   Name: Samantha Rodriguez 59 y.o. female I MRN: [id number]  Unit/Bed#: [facility] 615-01 I Date of Admission: 2025   Date of Service: 1/10/2025 I Hospital Day: 1     Assessment & Plan  Abdominal pain  Patient with CT scan concerning for SBO, however given patient's presentation this is more likely an ileus.  Patient has begun having bowel function, and has had complete resolution of her symptoms.  Labs appear to be within normal limits.    Status post NG tube removal of 1/10  Passing flatus, having bowel movements    Plan:  Advance diet as tolerated  Okay to DC IV fluids per primary team with diet advancement  Monitor abdominal exam  If tolerating diet, general surgery will sign off.  Please reach other questions or concerns      Subjective/Objective     Subjective:   Patient seen and examined at bedside, in no acute distress. No acute events overnight. Patient's pain is well controlled.  1 episode of nausea but without vomiting.  Passing flatus and having bowel movements.    Objective:   Vitals:Blood pressure 95/63, pulse 64, temperature 97.8 °F (36.6 °C), resp. rate 20, height 5' (1.524 m), weight 91.6 kg (202 lb), SpO2 97%, not currently breastfeeding.  Temp (24hrs), Av.3 °F (36.8 °C), Min:97.8 °F (36.6 °C), Max:98.6 °F (37 °C)        Intake/Output Summary (Last 24 hours) at 1/10/2025 2310  Last data filed at 1/10/2025 2131  Gross per 24 hour   Intake 1050.83 ml   Output 345 ml   Net 705.83 ml       Invasive Devices       Peripheral Intravenous Line  Duration             Peripheral IV 25 Left;Proximal;Ventral (anterior) Forearm 3 days    Peripheral IV 25 Right;Ventral (anterior) Forearm 2 days    Peripheral IV 01/10/25 Right Wrist <1 day              Drain  Duration             Closed/Suction Drain Back Bulb 19 Fr. <1 day    Closed/Suction Drain Back Bulb 19 Fr. <1 day                    Physical Exam:  General: No acute distress, alert and oriented  CV: Well perfused,  regular rate and rhythm  Lungs: Normal work of breathing, no increased respiratory effort   Abdomen: Soft, non-tender, non-distended.   Extremities: No edema, clubbing or cyanosis  Skin: Warm, dry

## 2025-01-11 NOTE — ASSESSMENT & PLAN NOTE
Patient with CT scan concerning for SBO, however given patient's presentation this is more likely an ileus.  Patient has begun having bowel function, and has had complete resolution of her symptoms.  Labs appear to be within normal limits.    Status post NG tube removal of 1/10  Passing flatus, having bowel movements    Plan:  Advance diet as tolerated  Okay to DC IV fluids per primary team with diet advancement  Monitor abdominal exam  If tolerating diet, general surgery will sign off.  Please reach other questions or concerns

## 2025-01-11 NOTE — PROGRESS NOTES
Progress Note - Hospitalist   Name: Samantha Rodriguez 59 y.o. female I MRN: [id number]  [facility] I Date of Admission: 1/9/2025   Date of Service: 1/11/2025 I Hospital Day: 2     Assessment & Plan  Wound dehiscence  Initially presented to [facility] 1/2 with ambulatory dysfunction.  Recently discharged from rehab following recent back surgery in December 2024.  On admission to [facility], patient met criteria and was diagnosed with UTI and RSV.  Patient also noted to have small area of wound dehiscence at the distal aspect of the wound from recent surgical hardware removal.  CT was ordered which showed ill-defined fluid with pockets of soft tissue gas and surrounding inflammatory stranding in the midline lower back suspicious for an abscess therefore patient was transferred to [place] for orthopedic evaluation.  Recent blood cultures 1/3 negative  S/P I&D lumbar spine 1/10 with ortho  Drain in place  Cultures pending  Continue IV Ancef for now  Consult ID  Pain control per APS recommendations: ATC APAP, lido patch, scheduled Robaxin, as needed oxycodone with IV Dilaudid for breakthrough.  Ileus (HCC)  Noted to have worsening epigastric sharp pain that started 1/8 overnight with an episode of vomiting  XR abdomen shows severely dilated loops concerning for SBO or ileus  CT A/P shows dilated loops of small bowel suggestive of underlying SBO with transition point in the central abdomen where there is slight swirling of the mesenteric vessel and underlying internal hernia cannot be fully excluded.  Fluid seen in the colon suggestive of diarrheal illness without colon wall thickening to suggest colitis.  Cholelithiasis.  Small ascites.  Previously noted neurostimulator leads have been removed in the interim, does appear to have small retained fragment in the posterior lower back.  Ill-defined fluid in the pocket of the soft tissue gas and surrounding inflammatory stranding in the midline low back  suspicious for abscess.  NG tube was placed, was evaluated by general surgery.  Bowel function returned prior to the OR, was cleared for OR for I&D.  NG tube is now out, tolerating p.o.  Abdominal exam benign.  Monitor  Lumbar radiculopathy  S/p removal hardware L2-S1, posterior fusion L5-S1, revision instrumentation L2-pelvis, bilateral S2 alar screws with orthopedic surgery on 12/19. Now dehiscence and concern for abscess as above.  Status post I&D as above  Pain control as above  Chronic pain disorder    Multiple short courses of opioids noted in history.  APS following for assistance with pain management, their recommendations are appreciated.  Bipolar disorder (HCC)  History of severe MDD and AMAURY with panic attacks  Continue PTA medications with Abilify, Zoloft, BuSpar, Minipress, trazodone and Atarax  GERD without esophagitis  Continue PPI  Essential hypertension  BP stable  Continue amlodipine and prazosin  Tardive dyskinesia  Patient is on Ingrezza which is nonformulary, Substitute with Cogentin 1 mg twice daily for now  Will have family bring it to be given if able   Outpatient follow-up with psychiatry and neurology  Ambulatory dysfunction  Patient reports after getting back from rehab she has been struggling to walk secondary to her back.  PT/OT evaluations pending   Typically uses walker  History of CVA (cerebrovascular accident)  Continue aspirin and statin  Anemia  Prior hemoglobin range of 12-13 in 2023 however most recently in the 9s since her surgery in December  Hemoglobin currently stable in the 9s  Monitor  RSV bronchitis  1/3 RSV positive  Chest x-ray without acute findings  Completed 5 days of steroid therapy  Symptoms resolved   History of UTI  Treated with 3 days of IV Ancef for E. coli prior to transfer    VTE Pharmacologic Prophylaxis:   Moderate Risk (Score 3-4) - Pharmacological DVT Prophylaxis Ordered: enoxaparin (Lovenox).    Mobility:   Basic Mobility Inpatient Raw Score: 17  JH-HLM  Goal: 5: Stand one or more mins  JH-HLM Achieved: 2: Bed activities/Dependent transfer  JH-HLM Goal NOT achieved. Continue with multidisciplinary rounding and encourage appropriate mobility to improve upon JH-HLM goals.    Patient Centered Rounds: I performed bedside rounds with nursing staff today.     Discussions with Specialists or Other Care Team Provider: nursing     Education and Discussions with Family / Patient: Attempted to update  (daughter) via phone. Unable to contact.    Current Length of Stay: 2 day(s)  Current Patient Status: Inpatient   Certification Statement: The patient will continue to require additional inpatient hospital stay due to IV abx pending cultures,   Discharge Plan: Anticipate discharge in 48-72 hrs to discharge location to be determined pending rehab evaluations.    Code Status: Level 1 - Full Code    Subjective   Patient reports some mild tenderness of her low back but controlled with pain medications.  Ate breakfast, had some mild nausea but no significant abdominal pain or vomiting.  Moving all extremities but has not been out of bed yet.    Objective :  Temp:  [97.8 °F (36.6 °C)-98.7 °F (37.1 °C)] 98.7 °F (37.1 °C)  HR:  [56-70] 65  BP: ()/(49-70) 115/68  Resp:  [12-21] 21  SpO2:  [93 %-100 %] 98 %  O2 Device: None (Room air)    Body mass index is 39.45 kg/m².     Input and Output Summary (last 24 hours):     Intake/Output Summary (Last 24 hours) at 1/11/2025 1057  Last data filed at 1/11/2025 0941  Gross per 24 hour   Intake 2010.83 ml   Output 445 ml   Net 1565.83 ml       Physical Exam  Vitals and nursing note reviewed.   Constitutional:       Appearance: She is obese.   Cardiovascular:      Rate and Rhythm: Normal rate.   Pulmonary:      Breath sounds: Normal breath sounds.   Abdominal:      General: Bowel sounds are normal. There is no distension.      Palpations: Abdomen is soft.      Tenderness: There is no abdominal tenderness.   Musculoskeletal:          General: No swelling.   Skin:     General: Skin is warm.      Comments: Lumbar incision clean dry and intact, SUSAN drain noted.  Serosanguineous fluid in bulb.   Neurological:      Mental Status: She is alert and oriented to person, place, and time. Mental status is at baseline.   Psychiatric:         Mood and Affect: Mood normal.      Comments: Tardive dyskinesia noted            Lines/Drains:  Lines/Drains/Airways       Active Status       Name Placement date Placement time Site Days    Closed/Suction Drain Back Bulb 19 Fr. 01/10/25  1234  Back  less than 1    Closed/Suction Drain Back Bulb 19 Fr. 01/10/25  1234  Back  less than 1                            Lab Results: I have reviewed the following results:   Results from last 7 days   Lab Units 01/11/25  0842   WBC Thousand/uL 7.95   HEMOGLOBIN g/dL 8.2*   HEMATOCRIT % 26.3*   PLATELETS Thousands/uL 453*   SEGS PCT % 66   LYMPHO PCT % 22   MONO PCT % 8   EOS PCT % 3     Results from last 7 days   Lab Units 01/11/25  0842   SODIUM mmol/L 137   POTASSIUM mmol/L 3.0*   CHLORIDE mmol/L 102   CO2 mmol/L 27   BUN mg/dL 15   CREATININE mg/dL 0.76   ANION GAP mmol/L 8   CALCIUM mg/dL 7.4*   GLUCOSE RANDOM mg/dL 83         Results from last 7 days   Lab Units 01/10/25  1427 01/06/25  0720 01/05/25  1611 01/04/25  2110   POC GLUCOSE mg/dl 88 102 163* 119         Results from last 7 days   Lab Units 01/10/25  1101   LACTIC ACID mmol/L 0.5       Recent Cultures (last 7 days):   Results from last 7 days   Lab Units 01/10/25  1227 01/10/25  1220   GRAM STAIN RESULT  No Polys or Bacteria seen 3+ Polys  No organisms seen       Imaging Results Review: No pertinent imaging studies reviewed.  Other Study Results Review: No additional pertinent studies reviewed.    Last 24 Hours Medication List:     Current Facility-Administered Medications:     acetaminophen (TYLENOL) tablet 975 mg, Q8H KESHIA    albuterol (PROVENTIL HFA,VENTOLIN HFA) inhaler 2 puff, Q4H PRN    aluminum-magnesium  hydroxide-simethicone (MAALOX) oral suspension 30 mL, Q4H PRN    amLODIPine (NORVASC) tablet 5 mg, Daily    ARIPiprazole (ABILIFY) tablet 10 mg, Daily    aspirin (ECOTRIN LOW STRENGTH) EC tablet 81 mg, Daily    atorvastatin (LIPITOR) tablet 40 mg, Daily With Dinner    benzonatate (TESSALON PERLES) capsule 100 mg, TID    benztropine (COGENTIN) tablet 1 mg, BID    busPIRone (BUSPAR) tablet 15 mg, TID    ceFAZolin (ANCEF) IVPB (premix in dextrose) 2,000 mg 50 mL, Q8H, Last Rate: 2,000 mg (01/11/25 0845)    dextromethorphan-guaiFENesin (ROBITUSSIN DM) oral syrup 10 mL, TID    enoxaparin (LOVENOX) subcutaneous injection 40 mg, Daily    HYDROmorphone (DILAUDID) injection 0.5 mg, Q4H PRN    hydrOXYzine HCL (ATARAX) tablet 25 mg, TID    lidocaine (LIDODERM) 5 % patch 1 patch, Daily    [Held by provider] lubiprostone (AMITIZA) capsule 8 mcg, BID    methocarbamol (ROBAXIN) tablet 750 mg, Q6H KESHIA    naloxone (NARCAN) 0.04 mg/mL syringe 0.04 mg, Q1MIN PRN    ondansetron (ZOFRAN) injection 4 mg, Q6H PRN    oxyCODONE (ROXICODONE) immediate release tablet 10 mg, Q4H PRN    oxyCODONE (ROXICODONE) IR tablet 5 mg, Q4H PRN    pantoprazole (PROTONIX) EC tablet 40 mg, BID AC    phenol (CHLORASEPTIC) 1.4 % mucosal liquid 1 spray, Q2H PRN    pneumococcal 20-cheryl conj vacc (PREVNAR 20) IM Injection 0.5 mL, Once PRN    potassium chloride (Klor-Con M20) CR tablet 40 mEq, BID    prazosin (MINIPRESS) capsule 2 mg, HS    pregabalin (LYRICA) capsule 150 mg, TID    sertraline (ZOLOFT) tablet 200 mg, HS    traZODone (DESYREL) tablet 300 mg, HS PRN    Administrative Statements   Today, Patient Was Seen By: MERCY Lindsay    **Please Note: This note may have been constructed using a voice recognition system.**

## 2025-01-11 NOTE — PROGRESS NOTES
"Progress Note - Orthopedics   Name: Samantha Rodriguez 59 y.o. female I MRN: [id number]  Unit/Bed#: [facility] 615-01 I Date of Admission: 1/9/2025   Date of Service: 1/11/2025 I Hospital Day: 2    Assessment & Plan  Wound dehiscence  Samantha is a 60 y/o F presenting 4 weeks s/p  weeks s/p removal hardware L2-S1, posterior fusion L5-S1, revision instrumentation L2-pelvis, bilateral S2 alar screws with Dr. Bills and Dr. Stevens (DOS 12/19/24) with concern for wound dehiscence.     Now s/p I&D lumbar spine. Doing well     WBAT all extremities  Regular diet  Continue ancef, f/u OR cultures  Monitor 2x lumbar drains   Multimodal pain control  PT/OT    Lumbar radiculopathy  See above        Subjective   59 y.o.female doing well post op. No acute events, no new complaints. Pain well controlled. Denies fevers, chills, CP, SOB, N/V, numbness or tingling. Patient reports no issues with urination or bowel movements. Doing well.    Objective :  Temp:  [97.8 °F (36.6 °C)-98.6 °F (37 °C)] 98.6 °F (37 °C)  HR:  [55-70] 65  BP: ()/(49-73) 96/49  Resp:  [12-20] 12  SpO2:  [93 %-100 %] 97 %  O2 Device: None (Room air)    Physical Exam  Musculoskeletal: bilaterallower  Skin intact . No erythema or ecchymosis.  Dressing c/d/i  TTP sona-incisional   Motor intact to +FHL/EHL, +ankle dorsi/plantar flexion  2+ DP pulse  Sensation intact L3-S1  Motor intact L3-S1  Drains in place with ss op      Lab Results: I have reviewed the following results:  Recent Labs     01/09/25  1933 01/10/25  0455   WBC 7.05 7.14   HGB 11.1* 9.9*   HCT 35.0 31.3*   * 547*   BUN  --  13   CREATININE  --  0.62     Blood Culture:    Lab Results   Component Value Date    BLOODCX No Growth After 5 Days. 01/03/2025    BLOODCX No Growth After 5 Days. 01/03/2025     Wound Culture: No results found for: \"WOUNDCULT\"  "

## 2025-01-12 VITALS
OXYGEN SATURATION: 98 % | RESPIRATION RATE: 16 BRPM | DIASTOLIC BLOOD PRESSURE: 68 MMHG | TEMPERATURE: 98.7 F | HEIGHT: 60 IN | SYSTOLIC BLOOD PRESSURE: 113 MMHG | HEART RATE: 65 BPM | BODY MASS INDEX: 39.66 KG/M2 | WEIGHT: 202 LBS

## 2025-01-12 PROBLEM — T84.7XXA WOUND INFECTION COMPLICATING HARDWARE (HCC): Status: ACTIVE | Noted: 2025-01-12

## 2025-01-12 PROBLEM — E66.812 CLASS 2 OBESITY WITHOUT SERIOUS COMORBIDITY WITH BODY MASS INDEX (BMI) OF 39.0 TO 39.9 IN ADULT: Status: ACTIVE | Noted: 2020-02-06

## 2025-01-12 LAB
ANION GAP SERPL CALCULATED.3IONS-SCNC: 6 MMOL/L (ref 4–13)
BACTERIA SPEC ANAEROBE CULT: NORMAL
BACTERIA SPEC ANAEROBE CULT: NORMAL
BACTERIA TISS AEROBE CULT: ABNORMAL
BACTERIA TISS AEROBE CULT: ABNORMAL
BASOPHILS # BLD AUTO: 0.01 THOUSANDS/ΜL (ref 0–0.1)
BASOPHILS NFR BLD AUTO: 0 % (ref 0–1)
BUN SERPL-MCNC: 15 MG/DL (ref 5–25)
CALCIUM SERPL-MCNC: 7.9 MG/DL (ref 8.4–10.2)
CHLORIDE SERPL-SCNC: 107 MMOL/L (ref 96–108)
CO2 SERPL-SCNC: 26 MMOL/L (ref 21–32)
CREAT SERPL-MCNC: 0.65 MG/DL (ref 0.6–1.3)
EOSINOPHIL # BLD AUTO: 0.45 THOUSAND/ΜL (ref 0–0.61)
EOSINOPHIL NFR BLD AUTO: 6 % (ref 0–6)
ERYTHROCYTE [DISTWIDTH] IN BLOOD BY AUTOMATED COUNT: 15.1 % (ref 11.6–15.1)
GFR SERPL CREATININE-BSD FRML MDRD: 96 ML/MIN/1.73SQ M
GLUCOSE SERPL-MCNC: 108 MG/DL (ref 65–140)
GRAM STN SPEC: ABNORMAL
HCT VFR BLD AUTO: 29.8 % (ref 34.8–46.1)
HGB BLD-MCNC: 9.1 G/DL (ref 11.5–15.4)
IMM GRANULOCYTES # BLD AUTO: 0.13 THOUSAND/UL (ref 0–0.2)
IMM GRANULOCYTES NFR BLD AUTO: 2 % (ref 0–2)
LYMPHOCYTES # BLD AUTO: 1.38 THOUSANDS/ΜL (ref 0.6–4.47)
LYMPHOCYTES NFR BLD AUTO: 19 % (ref 14–44)
MCH RBC QN AUTO: 27.2 PG (ref 26.8–34.3)
MCHC RBC AUTO-ENTMCNC: 30.5 G/DL (ref 31.4–37.4)
MCV RBC AUTO: 89 FL (ref 82–98)
MONOCYTES # BLD AUTO: 0.47 THOUSAND/ΜL (ref 0.17–1.22)
MONOCYTES NFR BLD AUTO: 7 % (ref 4–12)
NEUTROPHILS # BLD AUTO: 4.68 THOUSANDS/ΜL (ref 1.85–7.62)
NEUTS SEG NFR BLD AUTO: 66 % (ref 43–75)
NRBC BLD AUTO-RTO: 0 /100 WBCS
PLATELET # BLD AUTO: 470 THOUSANDS/UL (ref 149–390)
PMV BLD AUTO: 9.4 FL (ref 8.9–12.7)
POTASSIUM SERPL-SCNC: 3.7 MMOL/L (ref 3.5–5.3)
RBC # BLD AUTO: 3.34 MILLION/UL (ref 3.81–5.12)
SODIUM SERPL-SCNC: 139 MMOL/L (ref 135–147)
WBC # BLD AUTO: 7.12 THOUSAND/UL (ref 4.31–10.16)

## 2025-01-12 PROCEDURE — 99223 1ST HOSP IP/OBS HIGH 75: CPT | Performed by: INTERNAL MEDICINE

## 2025-01-12 PROCEDURE — 99232 SBSQ HOSP IP/OBS MODERATE 35: CPT | Performed by: NURSE PRACTITIONER

## 2025-01-12 PROCEDURE — NC001 PR NO CHARGE: Performed by: ORTHOPAEDIC SURGERY

## 2025-01-12 PROCEDURE — 80048 BASIC METABOLIC PNL TOTAL CA: CPT | Performed by: NURSE PRACTITIONER

## 2025-01-12 PROCEDURE — 85025 COMPLETE CBC W/AUTO DIFF WBC: CPT | Performed by: NURSE PRACTITIONER

## 2025-01-12 PROCEDURE — G0545 PR INHERENT VISIT TO INPT: HCPCS | Performed by: INTERNAL MEDICINE

## 2025-01-12 RX ADMIN — METHOCARBAMOL 750 MG: 750 TABLET ORAL at 00:15

## 2025-01-12 RX ADMIN — ACETAMINOPHEN 975 MG: 325 TABLET, FILM COATED ORAL at 14:19

## 2025-01-12 RX ADMIN — METHOCARBAMOL 750 MG: 750 TABLET ORAL at 11:51

## 2025-01-12 RX ADMIN — CEFAZOLIN SODIUM 2000 MG: 2 SOLUTION INTRAVENOUS at 00:17

## 2025-01-12 RX ADMIN — BENZTROPINE MESYLATE 1 MG: 1 TABLET ORAL at 17:18

## 2025-01-12 RX ADMIN — ACETAMINOPHEN 975 MG: 325 TABLET, FILM COATED ORAL at 21:22

## 2025-01-12 RX ADMIN — BUSPIRONE HYDROCHLORIDE 15 MG: 10 TABLET ORAL at 09:13

## 2025-01-12 RX ADMIN — HYDROXYZINE HYDROCHLORIDE 25 MG: 25 TABLET, FILM COATED ORAL at 17:18

## 2025-01-12 RX ADMIN — ASPIRIN 81 MG: 81 TABLET, COATED ORAL at 09:13

## 2025-01-12 RX ADMIN — ACETAMINOPHEN 975 MG: 325 TABLET, FILM COATED ORAL at 06:13

## 2025-01-12 RX ADMIN — PRAZOSIN HYDROCHLORIDE 2 MG: 2 CAPSULE ORAL at 21:22

## 2025-01-12 RX ADMIN — ATORVASTATIN CALCIUM 40 MG: 40 TABLET, FILM COATED ORAL at 17:18

## 2025-01-12 RX ADMIN — OXYCODONE HYDROCHLORIDE 10 MG: 10 TABLET ORAL at 06:12

## 2025-01-12 RX ADMIN — BUSPIRONE HYDROCHLORIDE 15 MG: 10 TABLET ORAL at 21:22

## 2025-01-12 RX ADMIN — PREGABALIN 150 MG: 75 CAPSULE ORAL at 21:24

## 2025-01-12 RX ADMIN — CEFTRIAXONE SODIUM 2000 MG: 10 INJECTION, POWDER, FOR SOLUTION INTRAVENOUS at 10:08

## 2025-01-12 RX ADMIN — ENOXAPARIN SODIUM 40 MG: 40 INJECTION SUBCUTANEOUS at 09:13

## 2025-01-12 RX ADMIN — LIDOCAINE 1 PATCH: 700 PATCH TOPICAL at 21:22

## 2025-01-12 RX ADMIN — PANTOPRAZOLE SODIUM 40 MG: 40 TABLET, DELAYED RELEASE ORAL at 17:18

## 2025-01-12 RX ADMIN — HYDROXYZINE HYDROCHLORIDE 25 MG: 25 TABLET, FILM COATED ORAL at 09:13

## 2025-01-12 RX ADMIN — HYDROXYZINE HYDROCHLORIDE 25 MG: 25 TABLET, FILM COATED ORAL at 21:22

## 2025-01-12 RX ADMIN — PREGABALIN 150 MG: 75 CAPSULE ORAL at 09:13

## 2025-01-12 RX ADMIN — PREGABALIN 150 MG: 75 CAPSULE ORAL at 17:18

## 2025-01-12 RX ADMIN — OXYCODONE HYDROCHLORIDE 10 MG: 10 TABLET ORAL at 14:19

## 2025-01-12 RX ADMIN — OXYCODONE HYDROCHLORIDE 10 MG: 10 TABLET ORAL at 19:42

## 2025-01-12 RX ADMIN — BUSPIRONE HYDROCHLORIDE 15 MG: 10 TABLET ORAL at 17:18

## 2025-01-12 RX ADMIN — METHOCARBAMOL 750 MG: 750 TABLET ORAL at 06:13

## 2025-01-12 RX ADMIN — AMLODIPINE BESYLATE 5 MG: 5 TABLET ORAL at 09:13

## 2025-01-12 RX ADMIN — BENZTROPINE MESYLATE 1 MG: 1 TABLET ORAL at 09:13

## 2025-01-12 RX ADMIN — ARIPIPRAZOLE 10 MG: 10 TABLET ORAL at 09:14

## 2025-01-12 RX ADMIN — METHOCARBAMOL 750 MG: 750 TABLET ORAL at 17:18

## 2025-01-12 RX ADMIN — PANTOPRAZOLE SODIUM 40 MG: 40 TABLET, DELAYED RELEASE ORAL at 06:13

## 2025-01-12 RX ADMIN — SERTRALINE HYDROCHLORIDE 200 MG: 100 TABLET ORAL at 21:22

## 2025-01-12 RX ADMIN — METHOCARBAMOL 750 MG: 750 TABLET ORAL at 23:29

## 2025-01-12 RX ADMIN — HYDROMORPHONE HYDROCHLORIDE 0.5 MG: 1 INJECTION, SOLUTION INTRAMUSCULAR; INTRAVENOUS; SUBCUTANEOUS at 09:14

## 2025-01-12 NOTE — ASSESSMENT & PLAN NOTE
Patient reports after getting back from rehab she has been struggling to walk secondary to her back.  Typically uses walker  Recommended rehab as above

## 2025-01-12 NOTE — ASSESSMENT & PLAN NOTE
Op report from 1/10 notes fluid tracking deep through the fascia to the hardware.  Both superficial and deep cultures of the fluid are growing Proteus mirabilis.  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  Baseline ESR and CRP are 81 and 231, respectively (1/3/2025).  - Change cefazolin to ceftriaxone 2 g every 24 hours, as this Proteus grew on culture despite cefazolin and about 25% per our antibiogram or cefazolin resistant  - Follow-up final susceptibility report  - I would anticipate 6 to 8-week IV antibiotic course likely followed by a long suppression course given concern for hardware involvement  - Follow BMP and CBCD with a.m. labs to assess for developing medication toxicity and treatment response  - She is planned to go to rehab which will facilitate IV antibiotic

## 2025-01-12 NOTE — UTILIZATION REVIEW
Initial Clinical Review    The patient was transferred to [facility] on 1/9/25 from [facility], where care began on 1/2/25.  7 midnights have already been surpassed with active ongoing care.     Admission: Date/Time/Statement:   Admission Orders (From admission, onward)       Ordered        01/10/25 0016  INPATIENT ADMISSION  Once                          Orders Placed This Encounter   Procedures    INPATIENT ADMISSION     Standing Status:   Standing     Number of Occurrences:   1     Level of Care:   Med Surg [16]     Estimated length of stay:   More than 2 Midnights     Certification:   I certify that inpatient services are medically necessary for this patient for a duration of greater than two midnights. See H&P and MD Progress Notes for additional information about the patient's course of treatment.     Initial Presentation: 59 y.o. female to [facility] transferred from [facility] with wound dehiscence and need for OR washout.  Pt admitted to the [facility] 1/3/2025 initially with ambulatory dysfunction, of note with recent hardware removal and L5-S1 fusion 12/19/2024 in the setting of lumbar radiculopathy. Initially this was suspected 2/2 the known lumbar radiculopathy in the setting of RSV infection managed supportively, additionally seen by orthopedic surgery where concern for wound breakdown/superficial infection were noted for which IV abx were initiated and VAC was placed. Initially nonoperative management was advised however dizziness persisted and pt ultimately was advised to be transferred here for orthopedic spine surgery intervention. Her hospitalization was complicated by development of SBO, general surgery advised conservative management with IV fluids/NGT/pain control.   On presentation pt c/o ongoing low back pain currently 8/10, some abd pain but improved since placement of NG tube.   Plan:  Admit inpatient to MS unit - Continue IV abx,  IVFs. NGT, Npo. Analgesics, antiemetics prn. Surgery & ortho consulted. Continue LSO brace when OOB. PTA po meds. Lovenox sq, SCDs.    Ortho consult -- 4 wks s/p  weeks s/p removal hardware L2-S1, posterior fusion L5-S1, revision instrumentation L2-pelvis, bilateral S2 alar screws with Dr. Bills and Dr. Stevens (DOS 12/19/24) with concern for wound dehiscence. Plan for OR today for I&D lumbar spine.  NPO for OR. WBAT all extremities. Continue multimodal pain control. PT/OT. Antibiotics    Surgery consult -- CT scan concerning for SBO, however given patient's presentation this is more likely an ileus.  Pt has begun having bowel function, and has had complete resolution of her symptoms.  Labs appear to be wnl.   - Can proceed with orthopedic surgical procedure. Keep NGT in place to suction. Abdominal checks. F/u lactate. Prn pain and nausea control     OPERATIVE REPORT  SURGERY DATE: 1/10/2025  Post-Op Diagnosis Codes:  Fusion of lumbar spine [M43.26]   Procedure(s):  Bilateral - Incision and Drainage Lumbar Spine  Anesthesia Type:   Choice  Operative Findings:  Superficial fluid cloudy collection, intact fascia     APS consult:  Multimodal analgesia:  Intravenous Tylenol 1000 mg every 8 hours scheduled  Start intravenous Robaxin 1000 mg every 8 hours scheduled  Lidoderm patch 12 hours on/12 hours off to affected area and avoiding incision  Lyrica 150 mg 3 times daily, home medication  IV Dilaudid 0.5 mg every 2 hours as needed for severe/breakthrough pain  Narcan as needed for respiratory pression/opioid reversal  Bowel regimen when appropriate from a surgical standpoint to prevent opioid-induced constipation     Once no longer n.p.o. consider the following regimen:  Tylenol 975 mg p.o. every 8 hours scheduled  Robaxin 750 mg p.o. every 6 hours scheduled  Lidoderm patch 12 hours on/12 hours off to affected area and avoiding incision  Lyrica 150 mg 3 times daily, home medication  Oxycodone 5 mg every 4 hours as  needed for moderate pain  Oxycodone 10 mg every 4 hours as needed for severe pain  IV Dilaudid 0.5 mg every 4 hours as needed for breakthrough pain    Evening surgery note -- Pt had multiple BMs yesterday and is passing flatus today. Has no n/v and is hungry. Abdomen is soft nondistended nontender. Also had sips with meds earlier with no issue.  Removed NGT and started CLD.    Date: 1/11  Day 3: Has surpassed a 2nd midnight with active treatments and services for tx post-op.  WBAT. Regular diet. D/C IVFs. Continue Ancef. Consult ID. Monitor 2x lumbar drains. Multimodal pain control per APS rec. PT/OT evals with d/c rec of IP skilled rehab. CM following for d/c plan.      Scheduled Medications:  acetaminophen, 975 mg, Oral, Q8H KESHIA  amLODIPine, 5 mg, Oral, Daily  ARIPiprazole, 10 mg, Oral, Daily  aspirin, 81 mg, Oral, Daily  atorvastatin, 40 mg, Oral, Daily With Dinner  benztropine, 1 mg, Oral, BID  busPIRone, 15 mg, Oral, TID  cefTRIAXone, 2,000 mg, Intravenous, Once  enoxaparin, 40 mg, Subcutaneous, Daily  hydrOXYzine HCL, 25 mg, Oral, TID  lidocaine, 1 patch, Topical, Daily  [Held by provider] lubiprostone, 8 mcg, Oral, BID  methocarbamol, 750 mg, Oral, Q6H KESHIA  pantoprazole, 40 mg, Oral, BID AC  prazosin, 2 mg, Oral, HS  pregabalin, 150 mg, Oral, TID  sertraline, 200 mg, Oral, HS    PRN Meds:  albuterol, 2 puff, Inhalation, Q4H PRN  aluminum-magnesium hydroxide-simethicone, 30 mL, Oral, Q4H PRN  dextromethorphan-guaiFENesin, 10 mL, Oral, Q4H PRN  HYDROmorphone, 0.5 mg, Intravenous, Q4H PRN 1/10 x6, 1/11 x2  naloxone, 0.04 mg, Intravenous, Q1MIN PRN  ondansetron, 4 mg, Intravenous, Q6H PRN  oxyCODONE, 10 mg, Oral, Q4H PRN 1/11 x2  oxyCODONE, 5 mg, Oral, Q4H PRN  phenol, 1 spray, Mouth/Throat, Q2H PRN  pneumococcal 20-cheryl conj vacc, 0.5 mL, Intramuscular, Once PRN  traZODone, 300 mg, Oral, HS PRN      Weight (last 2 days)       Date/Time Weight    01/10/25 1029 91.6 (202)    01/10/25 0600 91.8 (202.38)             Vital Signs (last 3 days)       Date/Time Temp Pulse Resp BP MAP (mmHg) SpO2 O2 Flow Rate (L/min) O2 Device [place] Coma Scale Score Pain    01/11/25 1200 -- -- -- -- -- -- -- -- 15 --    01/11/25 1156 -- -- -- -- -- -- -- -- -- 8 01/11/25 1155 -- -- -- -- -- -- -- -- -- 8 01/11/25 1125 -- -- -- -- -- -- -- -- -- 7    01/11/25 11:10:25 98.5 °F (36.9 °C) 69 20 111/68 82 92 % -- -- -- --    01/11/25 08:48:48 -- 65 -- 115/68 84 98 % -- -- -- --    01/11/25 0848 -- -- -- 115/68 -- -- -- -- -- --    01/11/25 08:47:05 -- 66 -- 99/55 70 96 % -- -- -- --    01/11/25 0844 -- -- -- -- -- -- -- -- -- 8 01/11/25 0800 -- -- -- -- -- -- -- None (Room air) 15 --    01/11/25 07:31:56 98.7 °F (37.1 °C) 64 21 116/66 83 96 % -- -- -- --    01/11/25 0552 -- -- -- -- -- -- -- -- -- 8 01/11/25 0400 -- -- -- -- -- -- -- -- 15 --    01/11/25 02:58:35 98.6 °F (37 °C) 65 12 96/49 65 97 % -- -- -- --    01/11/25 0000 -- -- -- -- -- -- -- -- 15 --    01/10/25 2349 -- -- -- -- -- -- -- -- -- 8    01/10/25 22:55:06 97.8 °F (36.6 °C) 64 -- 95/63 74 97 % -- -- -- --    01/10/25 1958 -- -- -- -- -- -- -- None (Room air) 15 9    01/10/25 19:18:46 98.3 °F (36.8 °C) 70 -- 106/63 77 100 % -- -- -- --    01/10/25 1833 -- 61 -- 111/67 82 95 % -- -- -- --    01/10/25 1632 -- -- -- 119/70 86 -- -- -- -- --    01/10/25 1532 98.4 °F (36.9 °C) -- -- 99/55 70 -- -- -- -- --    01/10/25 1526 -- -- -- -- -- -- -- -- -- 10 - Worst Possible Pain    01/10/25 1500 -- 69 20 112/56 80 98 % -- -- -- --    01/10/25 1445 98.6 °F (37 °C) 56 18 114/58 81 93 % -- None (Room air) 15 --    01/10/25 1430 -- 57 20 116/64 85 93 % -- -- 15 --    01/10/25 1425 -- -- -- -- -- -- -- -- -- 6    01/10/25 1420 -- -- -- -- -- -- -- -- -- 8    01/10/25 1415 -- 60 20 115/65 84 94 % -- -- 15 8    01/10/25 1410 -- 66 18 -- -- 98 % -- None (Room air) -- 10 - Worst Possible Pain    01/10/25 1400 98.4 °F (36.9 °C) 60 17 103/57 76 100 % 6 L/min Simple mask 15 --    01/10/25  1029 -- 60 -- 131/73 -- -- -- -- -- --    01/10/25 0800 -- -- -- -- -- 99 % -- None (Room air) -- No Pain    01/10/25 06:56:46 98.3 °F (36.8 °C) 55 18 131/73 92 96 % -- -- -- --    01/10/25 0536 -- -- -- -- -- -- -- -- -- 9    01/10/25 0332 -- -- -- -- -- -- -- -- -- 10 - Worst Possible Pain    01/10/25 0110 -- -- -- -- -- -- -- -- -- 9    01/10/25 0034 -- -- 20 -- -- 96 % -- -- -- --    01/10/25 0020 -- -- -- -- -- -- -- None (Room air) -- --    01/10/25 00:09:35 98.3 °F (36.8 °C) 68 -- 146/92 110 96 % -- -- -- --         Pertinent Labs/Diagnostic Test Results:   Radiology:  XR abdomen 1 view kub   Final Interpretation by Rigo Ordonez DO (01/10 1019)      Prominent air-filled loops of small bowel within the left abdomen with small amount of contrast within the colon and rectum. Findings may reflect ileus versus partial obstruction. Bowel distention appears similar to radiograph of the previous day.               Resident: Link Cuadra I, the attending radiologist, have reviewed the images and agree with the final report above.      Workstation performed: [id number]           Cardiology:  Echo complete w/ contrast if indicated   Final Result by Martha Andrade MD (01/10 7759)        Left Ventricle: Left ventricular cavity size is normal. Wall thickness    is normal. There is no concentric hypertrophy. The left ventricular    ejection fraction is 60-65%. Systolic function is normal. Wall motion is    normal. Diastolic function is normal.     Prior TTE study available for comparison. Prior study date: 7/19/2023.    No significant changes noted compared to the prior study.             Results from last 7 days   Lab Units 01/11/25  0842 01/10/25  0455 01/09/25  1933 01/08/25  0528   WBC Thousand/uL 7.95 7.14 7.05 4.56   HEMOGLOBIN g/dL 8.2* 9.9* 11.1* 9.2*   HEMATOCRIT % 26.3* 31.3* 35.0 29.5*   PLATELETS Thousands/uL 453* 547* 568* 476*   TOTAL NEUT ABS Thousands/µL 5.21  --   --   --        Results from  last 7 days   Lab Units 01/11/25  0842 01/10/25  0455 01/09/25  1933 01/08/25  0528 01/07/25  0437   SODIUM mmol/L 137 140  --  142 142   POTASSIUM mmol/L 3.0* 3.5  --  3.4* 3.3*   CHLORIDE mmol/L 102 102  --  104 103   CO2 mmol/L 27 27  --  30 31   ANION GAP mmol/L 8 11  --  8 8   BUN mg/dL 15 13  --  8 7   CREATININE mg/dL 0.76 0.62  --  0.66 0.61   EGFR ml/min/1.73sq m 84 97  --  95 98   CALCIUM mg/dL 7.4* 8.0*  --  8.2* 8.2*   MAGNESIUM mg/dL  --  1.9 1.8*  --   --      Results from last 7 days   Lab Units 01/10/25  1427 01/06/25  0720 01/05/25  1611   POC GLUCOSE mg/dl 88 102 163*     Results from last 7 days   Lab Units 01/11/25  0842 01/10/25  0455 01/08/25  0528 01/07/25  0437 01/06/25  0511   GLUCOSE RANDOM mg/dL 83 80 91 84 101     Results from last 7 days   Lab Units 01/10/25  1101   LACTIC ACID mmol/L 0.5     Results from last 7 days   Lab Units 01/08/25  0528   FERRITIN ng/mL 500*   IRON SATURATION % 17   IRON ug/dL 36*   TIBC ug/dL 205.8*     Results from last 7 days   Lab Units 01/08/25  0528   TRANSFERRIN mg/dL 147*     Results from last 7 days   Lab Units 01/10/25  1227 01/10/25  1220   GRAM STAIN RESULT  No Polys or Bacteria seen 3+ Polys  No organisms seen       Past Medical History:   Diagnosis Date    Anxiety     Arthritis     left knee    Arthritis of right knee 10/06/2020    At risk for falls     Bipolar 2 disorder (HCC)     FOLLOWS WITH PSYCHIATRIST. CONTINUE LAMOTRIGINE; RESOLVED: [id number]    Depression     Dysphagia     per [facility] facility paperwork    Familial tremor     both hands    Fibromyalgia     LAST ASSESSED: 08DEC2017    GERD (gastroesophageal reflux disease)     Hearing aid worn     left ear    [place] (hard of hearing)     left ear    Hyperlipidemia     Hypertension     Impaired speech     Left-sided weakness     Lumbar disc disease with radiculopathy 02/02/2018    Memory loss of unknown cause     long and short term    Migraine     Multiple closed fractures of metatarsal bone of  right foot 05/02/2021    Obesity     Obesity, Class II, BMI 35-39.9     Osteoarthritis of both hips 10/31/2016    Osteoarthritis of knee 02/20/2013    Description: Continue Tylenol and Naproxen. Encourage exercise and weight loss. Patient refused physical therapy. I will refer the patient back to Orthopedics.    Overactive bladder     Panic attack     Patellofemoral disorder of both knees 05/01/2020    Post traumatic stress disorder     Primary localized osteoarthritis of both knees 06/16/2017    Primary osteoarthritis of both knees 05/01/2020    S/P insertion of spinal cord stimulator     no remote    S/P total knee arthroplasty, right 03/10/2022    Sacroiliitis (HCC) 02/28/2017    Seasonal allergies     Seizure-like activity (HCC) 06/03/2022    Seizures (HCC)     possible seizure like activity    Small bowel obstruction (HCC) 03/24/2023    Status post total knee replacement, left 07/05/2022    Stroke ([facility])     questionable stroke 2009    Tardive dyskinesia     PATIENT STATES    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  CONT ASA 81 MG DAILY; RESOLVED: [id number]    Urinary incontinence     Uses walker     Wears dentures     partial lower / full upper- doesnt wear    Wears glasses      Present on Admission:   Ambulatory dysfunction   Lumbar radiculopathy   RSV bronchitis   History of UTI   Essential hypertension   Bipolar disorder ([facility])   Anemia   GERD without esophagitis   Tardive dyskinesia   Chronic pain disorder   Ileus ([facility])      Admitting Diagnosis: Wound dehiscence [T81.30XA]  Age/Sex: 59 y.o. female    Network Utilization Review Department  ATTENTION: Please call with any questions or concerns to [phone] and carefully listen to the prompts so that you are directed to the right person. All voicemails are confidential.   For Discharge needs, contact Care Management DC Support Team at [phone] opt. [phone]  Send all requests for admission clinical reviews, approved or denied  determinations and any other requests to dedicated fax number below belonging to the campus where the patient is receiving treatment. List of dedicated fax numbers for the Facilities:  FACILITY NAME UR FAX NUMBER   ADMISSION DENIALS (Administrative/Medical Necessity) [phone]   DISCHARGE SUPPORT TEAM (NETWORK) [phone]   PARENT CHILD HEALTH (Maternity/NICU/Pediatrics) [phone]   [facility] [phone]   [facility] [phone]   [facility] [phone]   [facility] [phone]   [facility] [phone]   General acute hospital [phone]   [facility] [phone]   [facility] [phone]   [facility] [phone]   [facility] [phone]   [facility] [phone]   [facility] [phone]

## 2025-01-12 NOTE — PROGRESS NOTES
"Progress Note - Orthopedics   Name: Samantha Rodriguez 59 y.o. female I MRN: [id number]  Unit/Bed#: [facility] 615-01 I Date of Admission: 1/9/2025   Date of Service: 1/12/2025 I Hospital Day: 3    Assessment & Plan  Wound dehiscence  Samantha is a 60 y/o F presenting 4 weeks s/p  weeks s/p removal hardware L2-S1, posterior fusion L5-S1, revision instrumentation L2-pelvis, bilateral S2 alar screws with Dr. Bills and Dr. Stevens (DOS 12/19/24) with concern for wound dehiscence.     Now s/p I&D lumbar spine. Doing well     WBAT all extremities  Regular diet  Continue ancef, f/u OR cultures-  Monitor 2x lumbar drains   Multimodal pain control  PT/OT    Lumbar radiculopathy  See above        Subjective   59 y.o.female doing well. No acute events, no new complaints. Pain well controlled. Denies fevers, chills, CP, SOB, N/V, numbness or tingling. Patient reports no issues with urination or bowel movements.     Objective :  Temp:  [97.4 °F (36.3 °C)-98.5 °F (36.9 °C)] 98.5 °F (36.9 °C)  HR:  [59-69] 59  BP: ()/(55-73) 111/69  Resp:  [20-23] 23  SpO2:  [92 %-98 %] 95 %  O2 Device: None (Room air)    Physical Exam  Musculoskeletal: bilateral lower  Skin intact . No erythema or ecchymosis.  Dressing c/d/i  TTP sona-incisional   Motor intact to +FHL/EHL, +ankle dorsi/plantar flexion  2+ DP pulse  Sensation intact L3-S1  Motor intact L3-S1  Drains in place with ss op      Lab Results: I have reviewed the following results:  Recent Labs     01/09/25  1933 01/10/25  0455 01/11/25  0842   WBC 7.05 7.14 7.95   HGB 11.1* 9.9* 8.2*   HCT 35.0 31.3* 26.3*   * 547* 453*   BUN  --  13 15   CREATININE  --  0.62 0.76     Blood Culture:    Lab Results   Component Value Date    BLOODCX No Growth After 5 Days. 01/03/2025    BLOODCX No Growth After 5 Days. 01/03/2025     Wound Culture: No results found for: \"WOUNDCULT\"  "

## 2025-01-12 NOTE — CONSULTS
Consultation - Infectious Disease   Name: Samantha Rodriguez 59 y.o. female I MRN: [id number]  [facility] I Date of Admission: 1/9/2025   Date of Service: 1/12/2025 I Hospital Day: 3   Inpatient consult to Infectious Diseases  Consult performed by: Link Mike MD  Consult ordered by: MERCY Lindsay        Physician Requesting Evaluation: Bettye Sierra MD   Reason for Evaluation / Principal Problem: Wound dehiscence with underlying hardware      Assessment & Plan  Wound dehiscence  This was a complication following lumbar spine hardware revision surgery on 12/19/2024.  She was readmitted in early January with worsening lower back pain and dehiscence at the distal surgical incision.  CT showed fluid collection around the surgical site in the soft tissues.  Status post I&D on 1/10 with cloudy fluid noted superficially and fluid tracking deeper to the hardware.  Cultures from both superficial and deep fluid are growing Proteus mirabilis.  - Continue antibiotics as below  - Follow-up susceptibility report  - Surgical evaluations ongoing  - Monitor fever, vitals, WBC count  - Additional interventions pending clinical course  Wound infection complicating hardware (HCC)  Op report from 1/10 notes fluid tracking deep through the fascia to the hardware.  Both superficial and deep cultures of the fluid are growing Proteus mirabilis.  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  Baseline ESR and CRP are 81 and 231, respectively (1/3/2025).  - Change cefazolin to ceftriaxone 2 g every 24 hours, as this Proteus grew on culture despite cefazolin and about 25% per our antibiogram or cefazolin resistant  - Follow-up final susceptibility report  - I would anticipate 6 to 8-week IV antibiotic course likely followed by a long suppression course given concern for hardware involvement  - Follow BMP and CBCD with a.m. labs to assess for developing  medication toxicity and treatment response  - She is planned to go to rehab which will facilitate IV antibiotic   Lumbar radiculopathy  With hardware placement in the past.  She had failure of hardware and underwent previous hardware removal and revision on 12/19/24.  Now complicated by infection above.  - Continue antibiotics as above  - Ortho follow up  Bipolar disorder (HCC)  Need to consider medication interactions with long-term antibiotic.  Patient on sertraline, aripiprazole, buspirone.  - No interactions with IV ceftriaxone  RSV bronchitis  Patient tested positive on 1/3/25.  Mild URI symptoms, no O2 requirement.  - Continue supportive care.  Ileus (HCC)  Noted on CT scan, NG tube removed and patient having bowel function.  - Will keep IV antibiotics for now  - Continue to monitor   Class 2 obesity without serious comorbidity with body mass index (BMI) of 39.0 to 39.9 in adult  This impacts antibiotic dosing.  - Using ceftriaxone 2 g as above    I have discussed with MERCY French and Dr. Bills the above plan to change to ceftriaxone and follow up susceptibility report; anticipate long antibiotic course. They agrees with the plan.    Antibiotics:  Cefazolin (since 1/3/25)    History of Present Illness   Samantha Rodriguez is a 59 y.o. woman with past medical history of HTN, HLD, obesity, bilateral TKA (2022) and history of lumbar spine surgery with hardware complications who underwent hardware removal on 12/19/2024.  I reviewed the op note.  Paresh in L2-S1 screw sets were removed.  L3, L4, and S1 pedicle screws were loose with excess toggle and were removed.  New fusion hardware was placed.  There was no mention of purulence.  She was readmitted 1/2/2025 with ambulatory dysfunction from her rehab and noted to have wound dehiscence at the distal end of the spinal surgical incision.  She was transferred to [facility] for I&D.  I reviewed the op note from 1/10/2025.  Incision was made through the  previous surgical site and upon entering the subcutaneous layer there was a fluid collection which appeared serous and cloudy.  Culture was taken.  There was only serous fluid deep to the fascia which did not appear pathologic though cultures were taken as well.  She had been receiving cefazolin since 1/3.    Today, Ms. Rodriguez shares she is doing well.  She reports her back pain after her initial surgery in December got better, then worsening prompting readmission.  She denies any fevers or chills.  After surgery on 1/10, her back pain is about the same not better or worse.  She still has no fevers or chills.  She is up and walking with help and using a walker.  Appetite is okay.    A complete review of systems is negative other than that noted in the HPI.    I have reviewed the patient's PMH, PSH, Social History, Family History, Meds, and Allergies    Objective :  Temp:  [97.4 °F (36.3 °C)-98.5 °F (36.9 °C)] 98.5 °F (36.9 °C)  HR:  [59-69] 59  BP: ()/(55-73) 111/69  Resp:  [20-23] 23  SpO2:  [92 %-98 %] 95 %    General:  No acute distress  Psychiatric:  Awake and alert  Pulmonary:  Normal respiratory excursion without accessory muscle use, clear bilaterally   Heart:  RRR, no murmurs   Abdomen:  Soft, nontender  Extremities:  No edema  Skin:  No rashes        Lab Results: I have reviewed the following results:  Results from last 7 days   Lab Units 01/11/25  0842 01/10/25  0455 01/09/25  1933   WBC Thousand/uL 7.95 7.14 7.05   HEMOGLOBIN g/dL 8.2* 9.9* 11.1*   PLATELETS Thousands/uL 453* 547* 568*     Results from last 7 days   Lab Units 01/11/25  0842 01/10/25  0455 01/08/25  0528   SODIUM mmol/L 137 140 142   POTASSIUM mmol/L 3.0* 3.5 3.4*   CHLORIDE mmol/L 102 102 104   CO2 mmol/L 27 27 30   BUN mg/dL 15 13 8   CREATININE mg/dL 0.76 0.62 0.66   EGFR ml/min/1.73sq m 84 97 95   CALCIUM mg/dL 7.4* 8.0* 8.2*     Results from last 7 days   Lab Units 01/10/25  1227 01/10/25  1220   GRAM STAIN RESULT  No Polys or  Bacteria seen 3+ Polys  No organisms seen             Results from last 7 days   Lab Units 01/08/25  0528   FERRITIN ng/mL 500*           Imaging Results Review: I personally reviewed the following image studies in PACS and associated radiology reports: CT abdomen/pelvis. My interpretation of the radiology images/reports is: CTAP from 1/9/2025 shows a collection of fluid and gas in the low back soft tissues.  Other Study Results Review: EKG was reviewed.  Other studies reviewed include: TTE report    Link Mike MD  [facility]

## 2025-01-12 NOTE — ASSESSMENT & PLAN NOTE
Noted to have worsening epigastric sharp pain that started 1/8 overnight with an episode of vomiting  XR abdomen shows severely dilated loops concerning for SBO or ileus  CT A/P shows dilated loops of small bowel suggestive of underlying SBO with transition point in the central abdomen where there is slight swirling of the mesenteric vessel and underlying internal hernia cannot be fully excluded.  Fluid seen in the colon suggestive of diarrheal illness without colon wall thickening to suggest colitis.  Cholelithiasis.  Small ascites.  Previously noted neurostimulator leads have been removed in the interim, does appear to have small retained fragment in the posterior lower back.  Ill-defined fluid in the pocket of the soft tissue gas and surrounding inflammatory stranding in the midline low back suspicious for abscess.  NG tube was placed, was evaluated by general surgery.  Bowel function returned prior to the OR, was cleared for OR.  NG tube is now out, tolerating p.o.  Abdominal exam benign.  Holding bowel regimen for now  Monitor

## 2025-01-12 NOTE — ASSESSMENT & PLAN NOTE
Initially presented to [facility] 1/2 with ambulatory dysfunction.  Recently discharged from rehab following recent back surgery in December 2024.  On admission to [place], patient met criteria and was diagnosed with UTI and RSV.  Patient also noted to have small area of wound dehiscence at the distal aspect of the wound from recent surgical hardware removal.  CT was ordered which showed ill-defined fluid with pockets of soft tissue gas and surrounding inflammatory stranding in the midline lower back suspicious for an abscess therefore patient was transferred to [place] for orthopedic evaluation.  Recent blood cultures 1/3 negative  S/P I&D lumbar spine 1/10 with ortho. Cloudy fluid noted superficially and fluid tracking deeper to the hardware.   Drain x 2 in place  Cultures growing proteus  IV Ancef switched to IV Ceftriaxone 1/12 pending final culture/susceptibilities per ID  Will require prolonged course of IV abx   Will order PICC line placement   Pain control per APS recommendations: ATC APAP, lido patch, scheduled Robaxin, as needed oxycodone with IV Dilaudid for breakthrough.  PT/OT recommending rehab.

## 2025-01-12 NOTE — ASSESSMENT & PLAN NOTE
With hardware placement in the past.  She had failure of hardware and underwent previous hardware removal and revision on 12/19/24.  Now complicated by infection above.  - Continue antibiotics as above  - Ortho follow up

## 2025-01-12 NOTE — ASSESSMENT & PLAN NOTE
Noted on CT scan, NG tube removed and patient having bowel function.  - Will keep IV antibiotics for now  - Continue to monitor

## 2025-01-12 NOTE — ASSESSMENT & PLAN NOTE
Need to consider medication interactions with long-term antibiotic.  Patient on sertraline, aripiprazole, buspirone.  - No interactions with IV ceftriaxone

## 2025-01-12 NOTE — PROGRESS NOTES
Progress Note - Hospitalist   Name: Samantha Rodriguez 59 y.o. female I MRN: [id number]  [facility] I Date of Admission: 1/9/2025   Date of Service: 1/12/2025 I Hospital Day: 3     Assessment & Plan  Wound infection complicating hardware (HCC)  Initially presented to [facility] 1/2 with ambulatory dysfunction.  Recently discharged from rehab following recent back surgery in December 2024.  On admission to [place], patient met criteria and was diagnosed with UTI and RSV.  Patient also noted to have small area of wound dehiscence at the distal aspect of the wound from recent surgical hardware removal.  CT was ordered which showed ill-defined fluid with pockets of soft tissue gas and surrounding inflammatory stranding in the midline lower back suspicious for an abscess therefore patient was transferred to [place] for orthopedic evaluation.  Recent blood cultures 1/3 negative  S/P I&D lumbar spine 1/10 with ortho. Cloudy fluid noted superficially and fluid tracking deeper to the hardware.   Drain x 2 in place  Cultures growing proteus  IV Ancef switched to IV Ceftriaxone 1/12 pending final culture/susceptibilities per ID  Will require prolonged course of IV abx   Will order PICC line placement   Pain control per APS recommendations: ATC APAP, lido patch, scheduled Robaxin, as needed oxycodone with IV Dilaudid for breakthrough.  PT/OT recommending rehab.   Ileus (HCC)  Noted to have worsening epigastric sharp pain that started 1/8 overnight with an episode of vomiting  XR abdomen shows severely dilated loops concerning for SBO or ileus  CT A/P shows dilated loops of small bowel suggestive of underlying SBO with transition point in the central abdomen where there is slight swirling of the mesenteric vessel and underlying internal hernia cannot be fully excluded.  Fluid seen in the colon suggestive of diarrheal illness without colon wall thickening to suggest colitis.  Cholelithiasis.  Small ascites.   Previously noted neurostimulator leads have been removed in the interim, does appear to have small retained fragment in the posterior lower back.  Ill-defined fluid in the pocket of the soft tissue gas and surrounding inflammatory stranding in the midline low back suspicious for abscess.  NG tube was placed, was evaluated by general surgery.  Bowel function returned prior to the OR, was cleared for OR.  NG tube is now out, tolerating p.o.  Abdominal exam benign.  Holding bowel regimen for now  Monitor  Lumbar radiculopathy  S/p removal hardware L2-S1, posterior fusion L5-S1, revision instrumentation L2-pelvis, bilateral S2 alar screws with orthopedic surgery on 12/19. Now dehiscence and concern for abscess as above.  Status post I&D as above  Pain control as above  Chronic pain disorder    Multiple short courses of opioids noted in history.  APS following for assistance with pain management, their recommendations are appreciated.  Bipolar disorder (HCC)  History of severe MDD and AMAURY with panic attacks  Continue PTA medications with Abilify, Zoloft, BuSpar, Minipress, trazodone and Atarax  GERD without esophagitis  Continue PPI  Essential hypertension  BP stable  Continue amlodipine and prazosin  Tardive dyskinesia  Patient is on Ingrezza which is nonformulary, Substitute with Cogentin 1 mg twice daily for now  Will have family bring it to be given if able   Outpatient follow-up with psychiatry and neurology  Ambulatory dysfunction  Patient reports after getting back from rehab she has been struggling to walk secondary to her back.  Typically uses walker  Recommended rehab as above   History of CVA (cerebrovascular accident)  Continue aspirin and statin  Anemia  Prior hemoglobin range of 12-13 in 2023 however most recently in the 9s since her surgery in December  Hemoglobin currently stable in the 9s  Monitor  RSV bronchitis  1/3 RSV positive  Chest x-ray without acute findings  Completed 5 days of steroid  therapy  Symptoms resolved   History of UTI  Treated with 3 days of IV Ancef for E. coli prior to transfer  Class 2 obesity without serious comorbidity with body mass index (BMI) of 39.0 to 39.9 in adult  Weight loss/lifestyle modifications as outpatient     VTE Pharmacologic Prophylaxis:   Moderate Risk (Score 3-4) - Pharmacological DVT Prophylaxis Ordered: enoxaparin (Lovenox).    Mobility:   Basic Mobility Inpatient Raw Score: 17  JH-HLM Goal: 5: Stand one or more mins  JH-HLM Achieved: 5: Stand (1 or more minutes)  JH-HLM Goal achieved. Continue to encourage appropriate mobility.    Patient Centered Rounds: I performed bedside rounds with nursing staff today.     Discussions with Specialists or Other Care Team Provider: nursing, ID Dr. Rashid CM regarding need for rehab and long term IV abx    Education and Discussions with Family / Patient: Updated  (sister) via phone.    Current Length of Stay: 3 day(s)    Current Patient Status: Inpatient       Certification Statement: The patient will continue to require additional inpatient hospital stay due to IV abx pending final cultures, drain management, dispo     Discharge Plan: Anticipate discharge in 48 hrs to rehab facility.    Code Status: Level 1 - Full Code    Subjective   No complaints.  Appetite is good.  Having some mild back pain but controlled with current regimen.  Had large bowel movement yesterday.  No nausea, vomiting.      Objective :  Temp:  [97.4 °F (36.3 °C)-98.5 °F (36.9 °C)] 98.5 °F (36.9 °C)  HR:  [59-77] 77  BP: (105-119)/(64-76) 119/76  Resp:  [20-23] 23  SpO2:  [92 %-98 %] 98 %    Body mass index is 39.45 kg/m².     Input and Output Summary (last 24 hours):     Intake/Output Summary (Last 24 hours) at 1/12/2025 0958  Last data filed at 1/12/2025 0900  Gross per 24 hour   Intake 1010 ml   Output 196 ml   Net 814 ml       Physical Exam  Vitals and nursing note reviewed.   Constitutional:       Appearance: She is obese.    Cardiovascular:      Rate and Rhythm: Normal rate.   Pulmonary:      Breath sounds: Normal breath sounds.   Abdominal:      General: Bowel sounds are normal. There is no distension.      Palpations: Abdomen is soft.      Tenderness: There is no abdominal tenderness.   Musculoskeletal:         General: No swelling.   Skin:     General: Skin is warm.      Comments: Lower back incision CDI two drains noted    Neurological:      Mental Status: She is alert and oriented to person, place, and time. Mental status is at baseline.   Psychiatric:         Mood and Affect: Mood normal.      Comments: Tardive dyskinesia noted            Lines/Drains:  Lines/Drains/Airways       Active Status       Name Placement date Placement time Site Days    Closed/Suction Drain Back Bulb 19 Fr. 01/10/25  1234  Back  1    Closed/Suction Drain Back Bulb 19 Fr. 01/10/25  1234  Back  1                            Lab Results: I have reviewed the following results:   Results from last 7 days   Lab Units 01/12/25  0843   WBC Thousand/uL 7.12   HEMOGLOBIN g/dL 9.1*   HEMATOCRIT % 29.8*   PLATELETS Thousands/uL 470*   SEGS PCT % 66   LYMPHO PCT % 19   MONO PCT % 7   EOS PCT % 6     Results from last 7 days   Lab Units 01/12/25  0843   SODIUM mmol/L 139   POTASSIUM mmol/L 3.7   CHLORIDE mmol/L 107   CO2 mmol/L 26   BUN mg/dL 15   CREATININE mg/dL 0.65   ANION GAP mmol/L 6   CALCIUM mg/dL 7.9*   GLUCOSE RANDOM mg/dL 108         Results from last 7 days   Lab Units 01/10/25  1427 01/06/25  0720 01/05/25  1611   POC GLUCOSE mg/dl 88 102 163*         Results from last 7 days   Lab Units 01/10/25  1101   LACTIC ACID mmol/L 0.5       Recent Cultures (last 7 days):   Results from last 7 days   Lab Units 01/10/25  1227 01/10/25  1220   GRAM STAIN RESULT  No Polys or Bacteria seen 3+ Polys  No organisms seen       Imaging Results Review: No pertinent imaging studies reviewed.  Other Study Results Review: No additional pertinent studies reviewed.    Last 24  Hours Medication List:     Current Facility-Administered Medications:     acetaminophen (TYLENOL) tablet 975 mg, Q8H KESHIA    albuterol (PROVENTIL HFA,VENTOLIN HFA) inhaler 2 puff, Q4H PRN    aluminum-magnesium hydroxide-simethicone (MAALOX) oral suspension 30 mL, Q4H PRN    amLODIPine (NORVASC) tablet 5 mg, Daily    ARIPiprazole (ABILIFY) tablet 10 mg, Daily    aspirin (ECOTRIN LOW STRENGTH) EC tablet 81 mg, Daily    atorvastatin (LIPITOR) tablet 40 mg, Daily With Dinner    benztropine (COGENTIN) tablet 1 mg, BID    busPIRone (BUSPAR) tablet 15 mg, TID    cefTRIAXone (ROCEPHIN) 2,000 mg in dextrose 5 % 50 mL IVPB, Once    dextromethorphan-guaiFENesin (ROBITUSSIN DM) oral syrup 10 mL, Q4H PRN    enoxaparin (LOVENOX) subcutaneous injection 40 mg, Daily    HYDROmorphone (DILAUDID) injection 0.5 mg, Q4H PRN    hydrOXYzine HCL (ATARAX) tablet 25 mg, TID    lidocaine (LIDODERM) 5 % patch 1 patch, Daily    [Held by provider] lubiprostone (AMITIZA) capsule 8 mcg, BID    methocarbamol (ROBAXIN) tablet 750 mg, Q6H KESHIA    naloxone (NARCAN) 0.04 mg/mL syringe 0.04 mg, Q1MIN PRN    ondansetron (ZOFRAN) injection 4 mg, Q6H PRN    oxyCODONE (ROXICODONE) immediate release tablet 10 mg, Q4H PRN    oxyCODONE (ROXICODONE) IR tablet 5 mg, Q4H PRN    pantoprazole (PROTONIX) EC tablet 40 mg, BID AC    phenol (CHLORASEPTIC) 1.4 % mucosal liquid 1 spray, Q2H PRN    pneumococcal 20-cheryl conj vacc (PREVNAR 20) IM Injection 0.5 mL, Once PRN    prazosin (MINIPRESS) capsule 2 mg, HS    pregabalin (LYRICA) capsule 150 mg, TID    sertraline (ZOLOFT) tablet 200 mg, HS    traZODone (DESYREL) tablet 300 mg, HS PRN    Administrative Statements   Today, Patient Was Seen By: Fransisca M Furlan, CRNP      **Please Note: This note may have been constructed using a voice recognition system.**

## 2025-01-12 NOTE — ASSESSMENT & PLAN NOTE
This was a complication following lumbar spine hardware revision surgery on 12/19/2024.  She was readmitted in early January with worsening lower back pain and dehiscence at the distal surgical incision.  CT showed fluid collection around the surgical site in the soft tissues.  Status post I&D on 1/10 with cloudy fluid noted superficially and fluid tracking deeper to the hardware.  Cultures from both superficial and deep fluid are growing Proteus mirabilis.  - Continue antibiotics as below  - Follow-up susceptibility report  - Surgical evaluations ongoing  - Monitor fever, vitals, WBC count  - Additional interventions pending clinical course

## 2025-01-12 NOTE — ASSESSMENT & PLAN NOTE
Patient tested positive on 1/3/25.  Mild URI symptoms, no O2 requirement.  - Continue supportive care.

## 2025-01-12 NOTE — CASE MANAGEMENT
Case Management Assessment & Discharge Planning Note    Patient name Samantha Rodriguez  Location [facility] 615/[facility] 615-01 MRN [id number]  : 1965 Date 2025       Current Admission Date: 2025  Current Admission Diagnosis:Wound infection complicating hardware ([facility])   Patient Active Problem List    Diagnosis Date Noted Date Diagnosed    Wound infection complicating hardware ([facility]) 2025     Ileus ([facility]) 2025     History of UTI 2025     Volume overload 2025     Urinary tract infection 2025     Wound of sacral region 2025     RSV bronchitis 2025     Acute blood loss anemia 2024     Pain associated with surgical procedure 2024     Toxic encephalopathy 2024     Severe episode of recurrent major depressive disorder, without psychotic features ([facility]) 2024     Chronic low back pain, unspecified back pain laterality, unspecified whether sciatica present 2024     Opioid-induced constipation 2024     Weight gain 2024     Numbness 2023     Small bowel obstruction ([facility]) 2023     Memory loss 2023     Hemiplegia, post-stroke ([facility]) 2022     Anemia 2022     Hypokalemia 2022     Slow transit constipation 03/15/2022     CVA (cerebral vascular accident) ([facility]) 2022     Mixed hyperlipidemia 2021     History of CVA (cerebrovascular accident) 2021     Dysphagia 2020     Ambulatory dysfunction 2020     Status post insertion of spinal cord stimulator 2020     SIRS (systemic inflammatory response syndrome) ([facility]) 2020     Tardive dyskinesia 2020     MIMI (obstructive sleep apnea)      Class 2 obesity without serious comorbidity with body mass index (BMI) of 39.0 to 39.9 in adult 2020     Post laminectomy syndrome 10/07/2019     Bipolar I disorder, most recent episode depressed ([facility]) 2019 09/15/2023    Spinal stenosis of lumbar region with neurogenic  claudication 02/02/2018     Lumbar radiculopathy 12/08/2017     Chronic pain disorder 02/28/2017     Lumbar spondylosis 10/31/2016     Generalized anxiety disorder with panic attacks 10/26/2016     Bipolar disorder (HCC) 06/18/2015     PTSD (post-traumatic stress disorder) 06/18/2015     Panic disorder without agoraphobia 06/18/2015     Tremor 06/12/2014     Migraine 05/27/2014     GERD without esophagitis 05/01/2014     Cognitive disorder 04/18/2014     Overactive bladder 09/26/2013     Mood insomnia (HCC) 01/31/2013     Urinary incontinence 09/24/2012     Vitamin D deficiency 09/18/2012     Fibromyalgia 09/14/2012     Essential hypertension 09/14/2012       LOS (days): 3  Geometric Mean LOS (GMLOS) (days): 2.9  Days to GMLOS:0.4     OBJECTIVE:  PATIENT READMITTED TO HOSPITAL  Risk of Unplanned Readmission Score: 32.33         Current admission status: Inpatient       Preferred Pharmacy:   [address]  [address] [place] [id number]  Phone: [phone] Fax: [phone]    [id number] - [place]ET[place] - [address]  [address] [place] [address]  Phone: [phone] Fax: [phone]    [facility]  [place] - [address]  [address]  [place] [address]  Phone: [phone] Fax: [phone]    [facility] [place], [place] - [address]  [address] [address]  Phone: [phone] Fax: [phone]    [facility] [place] - [address]  [address]  [place] [address]  Phone: [phone] Fax: [phone]    Primary Care Provider: Glo Valente DO    Primary Insurance: [employer] [facility] RE[employer]  Secondary Insurance: [employer]    ASSESSMENT:  Active Health Care Proxies       Tunde Rodriguez [facility] Care Representative - Daughter   Primary  Phone: [phone] (Mobile)                 Advance Directives  Primary Contact: Tunde Rodriguez (Daughter) [phone]    Readmission Root Cause  30 Day Readmission: Yes  During your hospital stay, did someone (provider, nurse, ) explain your care to you in a way you could understand?: Yes  Did you feel medically stable to leave the hospital?: Yes  Were you able to pay for your medication at the pharmacy?: Yes  Did you have reliable transportation to take you to your appointments?: Yes  What post-acute resources were offered?: STR  Patient was readmitted due to: Ambulatory Dysfunction  Action Plan: [facility]    Patient Information  Admitted from:: Home  Mental Status: Alert  During Assessment patient was accompanied by: Not accompanied during assessment  Assessment information provided by:: Patient  Support Systems: Self, Children, Daughter  County of Residence: [place]  What city do you live in?: [place]  Home entry access options. Select all that apply.: Stairs  Number of steps to enter home.: 2  Do the steps have railings?: Yes  Living Arrangements: Lives w/ Family members, Lives w/ Daughter  Is patient a ?: No    Activities of Daily Living Prior to Admission  Functional Status: Assistance  Completes ADLs independently?: Yes  Ambulates independently?: Yes  Does patient use assisted devices?: Yes  Assisted Devices (DME) used: Rollator  Does patient currently own DME?: Yes  What DME does the patient currently own?: Rollator  Does patient have a history of Outpatient Therapy (PT/OT)?: No  Does the patient have a history of Short-Term Rehab?: Yes ([place])  Does patient have a history of HHC?: Yes  Does patient currently have HHC?: No    Patient Information Continued  Income Source: SSI/SSD  Does patient have prescription coverage?: Yes  Does patient receive dialysis treatments?: No  Does patient have a history of substance abuse?: No  Does patient have a history of Mental Health  Diagnosis?: Yes  Is patient receiving treatment for mental health?: Yes  Has patient received inpatient treatment related to mental health in the last 2 years?: Yes (2024)    Means of Transportation  Means of Transport to Appts:: Family transport    DISCHARGE DETAILS:    Discharge planning discussed with:: patient  Freedom of Choice: Yes     CM contacted family/caregiver?: Yes  Were Treatment Team discharge recommendations reviewed with patient/caregiver?: Yes  Did patient/caregiver verbalize understanding of patient care needs?: N/A- going to facility  Were patient/caregiver advised of the risks associated with not following Treatment Team discharge recommendations?: Yes    Contacts  Patient Contacts: Tunde Rodriguez (Daughter) [phone]  Relationship to Patient:: Family  Contact Method: Phone  Phone Number: [phone]  Reason/Outcome: Emergency Contact, Discharge Planning    Requested Home Health Care         Is the patient interested in HHC at discharge?: No    DME Referral Provided  Referral made for DME?: No    Other Referral/Resources/Interventions Provided:  Interventions: SNF    Treatment Team Recommendation: Short Term Rehab  Discharge Destination Plan:: Short Term Rehab      Cm met with pt to discuss d/c planning  Pt was a tx from [place].   While there, pt was evaluated and recommended for IP rehab. Pt accepted to [facility] and auth was obtained. Pt's auth  on 1/10/25  Plan remains for the pt to d/c there once medically stable, but pt will need a new authorization.   Pt had a recent IP Psych admission 24 at [facility]. Due to this, the pt was optioned while at [facility]. The pt's determination letter was uploaded to [facility] and SNF has in hand.   CM will follow for medical stability, which could occur in the next 48-72 hours    CM reviewed d/c planning process including the following: identifying help at home, patient preference for d/c planning needs, Discharge  [facility] Meds to Bed program, availability of treatment team to discuss questions or concerns patient and/or family may have regarding understanding medications and recognizing signs and symptoms once discharged.  CM also encouraged patient to follow up with all recommended appointments after discharge. Patient advised of importance for patient and family to participate in managing patient’s medical well being.

## 2025-01-12 NOTE — ASSESSMENT & PLAN NOTE
Samantha is a 60 y/o F presenting 4 weeks s/p  weeks s/p removal hardware L2-S1, posterior fusion L5-S1, revision instrumentation L2-pelvis, bilateral S2 alar screws with Dr. Bills and Dr. Stevens (DOS 12/19/24) with concern for wound dehiscence.     Now s/p I&D lumbar spine. Doing well     WBAT all extremities  Regular diet  Continue ancef, f/u OR cultures-  Monitor 2x lumbar drains   Multimodal pain control  PT/OT

## 2025-01-13 ENCOUNTER — APPOINTMENT (INPATIENT)
Dept: RADIOLOGY | Facility: HOSPITAL | Age: 60
DRG: 857 | End: 2025-01-13
Payer: MEDICARE

## 2025-01-13 LAB
ANION GAP SERPL CALCULATED.3IONS-SCNC: 5 MMOL/L (ref 4–13)
BASOPHILS # BLD AUTO: 0.02 THOUSANDS/ΜL (ref 0–0.1)
BASOPHILS NFR BLD AUTO: 0 % (ref 0–1)
BUN SERPL-MCNC: 12 MG/DL (ref 5–25)
CALCIUM SERPL-MCNC: 7.4 MG/DL (ref 8.4–10.2)
CHLORIDE SERPL-SCNC: 106 MMOL/L (ref 96–108)
CO2 SERPL-SCNC: 28 MMOL/L (ref 21–32)
CREAT SERPL-MCNC: 0.48 MG/DL (ref 0.6–1.3)
EOSINOPHIL # BLD AUTO: 0.54 THOUSAND/ΜL (ref 0–0.61)
EOSINOPHIL NFR BLD AUTO: 10 % (ref 0–6)
ERYTHROCYTE [DISTWIDTH] IN BLOOD BY AUTOMATED COUNT: 15.3 % (ref 11.6–15.1)
GFR SERPL CREATININE-BSD FRML MDRD: 106 ML/MIN/1.73SQ M
GLUCOSE SERPL-MCNC: 88 MG/DL (ref 65–140)
HCT VFR BLD AUTO: 26.7 % (ref 34.8–46.1)
HGB BLD-MCNC: 8.1 G/DL (ref 11.5–15.4)
IMM GRANULOCYTES # BLD AUTO: 0.11 THOUSAND/UL (ref 0–0.2)
IMM GRANULOCYTES NFR BLD AUTO: 2 % (ref 0–2)
LYMPHOCYTES # BLD AUTO: 1.61 THOUSANDS/ΜL (ref 0.6–4.47)
LYMPHOCYTES NFR BLD AUTO: 29 % (ref 14–44)
MCH RBC QN AUTO: 27.1 PG (ref 26.8–34.3)
MCHC RBC AUTO-ENTMCNC: 30.3 G/DL (ref 31.4–37.4)
MCV RBC AUTO: 89 FL (ref 82–98)
MONOCYTES # BLD AUTO: 0.39 THOUSAND/ΜL (ref 0.17–1.22)
MONOCYTES NFR BLD AUTO: 7 % (ref 4–12)
NEUTROPHILS # BLD AUTO: 2.84 THOUSANDS/ΜL (ref 1.85–7.62)
NEUTS SEG NFR BLD AUTO: 52 % (ref 43–75)
NRBC BLD AUTO-RTO: 0 /100 WBCS
PLATELET # BLD AUTO: 452 THOUSANDS/UL (ref 149–390)
PMV BLD AUTO: 9.5 FL (ref 8.9–12.7)
POTASSIUM SERPL-SCNC: 3.8 MMOL/L (ref 3.5–5.3)
RBC # BLD AUTO: 2.99 MILLION/UL (ref 3.81–5.12)
SODIUM SERPL-SCNC: 139 MMOL/L (ref 135–147)
WBC # BLD AUTO: 5.51 THOUSAND/UL (ref 4.31–10.16)

## 2025-01-13 PROCEDURE — 99232 SBSQ HOSP IP/OBS MODERATE 35: CPT | Performed by: PHYSICIAN ASSISTANT

## 2025-01-13 PROCEDURE — 99232 SBSQ HOSP IP/OBS MODERATE 35: CPT

## 2025-01-13 PROCEDURE — NC001 PR NO CHARGE: Performed by: ORTHOPAEDIC SURGERY

## 2025-01-13 PROCEDURE — 85025 COMPLETE CBC W/AUTO DIFF WBC: CPT | Performed by: NURSE PRACTITIONER

## 2025-01-13 PROCEDURE — 74022 RADEX COMPL AQT ABD SERIES: CPT

## 2025-01-13 PROCEDURE — C1751 CATH, INF, PER/CENT/MIDLINE: HCPCS

## 2025-01-13 PROCEDURE — G0545 PR INHERENT VISIT TO INPT: HCPCS | Performed by: INTERNAL MEDICINE

## 2025-01-13 PROCEDURE — 80048 BASIC METABOLIC PNL TOTAL CA: CPT | Performed by: NURSE PRACTITIONER

## 2025-01-13 PROCEDURE — 99232 SBSQ HOSP IP/OBS MODERATE 35: CPT | Performed by: INTERNAL MEDICINE

## 2025-01-13 PROCEDURE — 36569 INSJ PICC 5 YR+ W/O IMAGING: CPT

## 2025-01-13 RX ADMIN — PRAZOSIN HYDROCHLORIDE 2 MG: 2 CAPSULE ORAL at 21:34

## 2025-01-13 RX ADMIN — METHOCARBAMOL 750 MG: 750 TABLET ORAL at 12:03

## 2025-01-13 RX ADMIN — HYDROMORPHONE HYDROCHLORIDE 0.5 MG: 1 INJECTION, SOLUTION INTRAMUSCULAR; INTRAVENOUS; SUBCUTANEOUS at 19:36

## 2025-01-13 RX ADMIN — PREGABALIN 150 MG: 75 CAPSULE ORAL at 16:46

## 2025-01-13 RX ADMIN — ARIPIPRAZOLE 10 MG: 10 TABLET ORAL at 08:51

## 2025-01-13 RX ADMIN — ONDANSETRON 4 MG: 2 INJECTION INTRAMUSCULAR; INTRAVENOUS at 19:41

## 2025-01-13 RX ADMIN — BENZTROPINE MESYLATE 1 MG: 1 TABLET ORAL at 08:50

## 2025-01-13 RX ADMIN — ATORVASTATIN CALCIUM 40 MG: 40 TABLET, FILM COATED ORAL at 16:46

## 2025-01-13 RX ADMIN — HYDROXYZINE HYDROCHLORIDE 25 MG: 25 TABLET, FILM COATED ORAL at 08:50

## 2025-01-13 RX ADMIN — CEFTRIAXONE SODIUM 2000 MG: 10 INJECTION, POWDER, FOR SOLUTION INTRAVENOUS at 10:06

## 2025-01-13 RX ADMIN — ASPIRIN 81 MG: 81 TABLET, COATED ORAL at 08:50

## 2025-01-13 RX ADMIN — AMLODIPINE BESYLATE 5 MG: 5 TABLET ORAL at 08:50

## 2025-01-13 RX ADMIN — PREGABALIN 150 MG: 75 CAPSULE ORAL at 08:50

## 2025-01-13 RX ADMIN — ACETAMINOPHEN 975 MG: 325 TABLET, FILM COATED ORAL at 05:48

## 2025-01-13 RX ADMIN — PREGABALIN 150 MG: 75 CAPSULE ORAL at 21:34

## 2025-01-13 RX ADMIN — SERTRALINE HYDROCHLORIDE 200 MG: 100 TABLET ORAL at 21:34

## 2025-01-13 RX ADMIN — BUSPIRONE HYDROCHLORIDE 15 MG: 10 TABLET ORAL at 16:46

## 2025-01-13 RX ADMIN — METHOCARBAMOL 750 MG: 750 TABLET ORAL at 05:48

## 2025-01-13 RX ADMIN — ACETAMINOPHEN 975 MG: 325 TABLET, FILM COATED ORAL at 21:34

## 2025-01-13 RX ADMIN — METHOCARBAMOL 750 MG: 750 TABLET ORAL at 17:00

## 2025-01-13 RX ADMIN — ENOXAPARIN SODIUM 40 MG: 40 INJECTION SUBCUTANEOUS at 08:50

## 2025-01-13 RX ADMIN — OXYCODONE HYDROCHLORIDE 10 MG: 10 TABLET ORAL at 12:03

## 2025-01-13 RX ADMIN — GUAIFENESIN AND DEXTROMETHORPHAN 10 ML: 100; 10 SYRUP ORAL at 08:55

## 2025-01-13 RX ADMIN — BENZTROPINE MESYLATE 1 MG: 1 TABLET ORAL at 17:00

## 2025-01-13 RX ADMIN — HYDROXYZINE HYDROCHLORIDE 25 MG: 25 TABLET, FILM COATED ORAL at 21:34

## 2025-01-13 RX ADMIN — OXYCODONE HYDROCHLORIDE 10 MG: 10 TABLET ORAL at 05:49

## 2025-01-13 RX ADMIN — PANTOPRAZOLE SODIUM 40 MG: 40 TABLET, DELAYED RELEASE ORAL at 16:46

## 2025-01-13 RX ADMIN — HYDROMORPHONE HYDROCHLORIDE 0.5 MG: 1 INJECTION, SOLUTION INTRAMUSCULAR; INTRAVENOUS; SUBCUTANEOUS at 08:55

## 2025-01-13 RX ADMIN — PANTOPRAZOLE SODIUM 40 MG: 40 TABLET, DELAYED RELEASE ORAL at 05:48

## 2025-01-13 RX ADMIN — ACETAMINOPHEN 975 MG: 325 TABLET, FILM COATED ORAL at 13:43

## 2025-01-13 RX ADMIN — OXYCODONE HYDROCHLORIDE 10 MG: 10 TABLET ORAL at 16:46

## 2025-01-13 RX ADMIN — BUSPIRONE HYDROCHLORIDE 15 MG: 10 TABLET ORAL at 08:50

## 2025-01-13 RX ADMIN — HYDROXYZINE HYDROCHLORIDE 25 MG: 25 TABLET, FILM COATED ORAL at 16:46

## 2025-01-13 RX ADMIN — BUSPIRONE HYDROCHLORIDE 15 MG: 10 TABLET ORAL at 21:34

## 2025-01-13 RX ADMIN — OXYCODONE HYDROCHLORIDE 10 MG: 10 TABLET ORAL at 21:33

## 2025-01-13 NOTE — PROGRESS NOTES
Progress Note - Infectious Disease   Name: Samantha Rodriguez 59 y.o. female I MRN: [id number]  [facility] I Date of Admission: 1/9/2025   Date of Service: 1/13/2025 I Hospital Day: 4    Assessment & Plan  Wound infection complicating hardware (HCC)  Presenting with delayed wound dehiscence in setting of recent fusion surgery as below.  Status post I&D 1/10.25.  Intraoperative findings notable for wound dehiscence to level of fascia which appeared intact.  Fascia was opened and deep space was explored.  Superficial and deep cultures grew Proteus.  Concern for HW involvement.    Continue ceftriaxone 2g IV Q24 for 6 weeks postop given concenr for HW infection  Monitor closely while on antibiotics for toxicities including diarrhea, rash, cytopenias, or kidney injury  Check weekly CBC-diff, CMP while on IV antibiotics to monitor for toxicities.  D/C planning for STR  Outpatient follow-up with ID 2 weeks after D/C  D/C PICC after last dose of IV antibiotics  Given retained HW, will likely need suppressive antibiotics after IV course complete  Lumbar radiculopathy  Remote fusion L2-S1.  Underwent removal hardware L2-S1, posterior fusion L5-S1, revision instrumentation L2 to pelvis; application of bilateral S2 alar screws and bone grafting of the disc at L5-S1 12/19/24.  Complicated by infection as above.  RSV bronchitis  Patient tested positive on 1/3/25.  Mild URI symptoms, no O2 requirement.  Improved.    I have discussed with MARGI Blake with [facility] the above plan to continue IV antibiotics, D/C planning. She agrees with the plan.  The patient is stable from an ID standpoint for D/C to STR when bed available.    Antibiotics:  Ceftriaxone  POD #3    Subjective   Patient has no fever, chills, sweats; no nausea, vomiting, diarrhea; no cough, shortness of breath; no pain. No new symptoms.    Objective :  Temp:  [98 °F (36.7 °C)-98.8 °F (37.1 °C)] 98.7 °F (37.1 °C)  HR:  [65-78] 66  BP: (109-116)/(64-70)  113/64  Resp:  [16-21] 17  SpO2:  [98 %-100 %] 100 %  O2 Device: None (Room air)    General:  No acute distress  Psychiatric:  Awake and alert  Pulmonary:  Normal respiratory excursion without accessory muscle use  Abdomen:  Soft, nontender  Extremities:  No edema  Skin:  No rashes      Lab Results: I have reviewed the following results:  Results from last 7 days   Lab Units 01/13/25  0509 01/12/25  0843 01/11/25  0842   WBC Thousand/uL 5.51 7.12 7.95   HEMOGLOBIN g/dL 8.1* 9.1* 8.2*   PLATELETS Thousands/uL 452* 470* 453*     Results from last 7 days   Lab Units 01/13/25  0509 01/12/25  0843 01/11/25  0842   SODIUM mmol/L 139 139 137   POTASSIUM mmol/L 3.8 3.7 3.0*   CHLORIDE mmol/L 106 107 102   CO2 mmol/L 28 26 27   BUN mg/dL 12 15 15   CREATININE mg/dL 0.48* 0.65 0.76   EGFR ml/min/1.73sq m 106 96 84   CALCIUM mg/dL 7.4* 7.9* 7.4*     Results from last 7 days   Lab Units 01/10/25  1227 01/10/25  1220   GRAM STAIN RESULT  No Polys or Bacteria seen 3+ Polys  No organisms seen             Results from last 7 days   Lab Units 01/08/25  0528   FERRITIN ng/mL 500*

## 2025-01-13 NOTE — ASSESSMENT & PLAN NOTE
Patient with hx of chronic pain  Multiple short prescriptions of opioids over the past few months    Outpatient regimen includes:  MS IR 15 mg tablets twice daily as needed (recently filled 12/30/2024 #30tabs/15days)  Diclofenac sodium 2 g gel 4 times daily as needed  Cyclobenzaprine 10 mg 3 times daily as needed  Lyrica 150 mg 3 times daily    PDMP review:  Filled  Written  Drug  QTY  Days  Prescriber  Refill  Daily Dose*      12/30/2024 12/30/2024 Pregabalin 150 Mg Capsule 45.00 15 Mi Rum 0 3.01 LME PA   12/30/2024 12/30/2024 Morphine Sulfate Ir 15 Mg Tab 30.00 15 Mi Rum 0 30.00 MME PA   12/03/2024 12/02/2024 Morphine Sulfate Ir 15 Mg Tab 60.00 30 Ch [facility] 0 30.00 MME PA   12/03/2024 12/02/2024 Pregabalin 150 Mg Capsule 90.00 30 Ch [facility] 0 3.01 LME PA   11/13/2024 10/22/2024 Morphine Sulfate Ir 15 Mg Tab 24.00 4 Mi Rum 0 90.00 MME PA   11/08/2024 11/08/2024 Pregabalin 150 Mg Capsule 90.00 30 Mi Rum 0 3.01 LME PA   11/03/2024 09/28/2024 Pregabalin 150 Mg Capsule 6.00 2 Mi Rum 0 3.01 LME PA   11/03/2024 10/22/2024 Morphine Sulfate Ir 15 Mg Tab 30.00 5 Mi Rum 0 90.00 MME PA   10/23/2024 10/22/2024 Morphine Sulfate Ir 15 Mg Tab 30.00 5 Mi Rum 0 90.00 MME PA   10/22/2024 10/10/2024 Morphine Sulfate Ir 15 Mg Tab 6.00 1 Mi Rum 0 90.00 MME PA   10/12/2024 09/28/2024 Pregabalin 150 Mg Capsule 42.00 14 Mi Rum 0 3.01 LME PA   10/10/2024 10/10/2024 Morphine Sulfate Ir 15 Mg Tab 24.00 4 Mi Rum 0 90.00 MME PA   09/28/2024 09/28/2024 Pregabalin 150 Mg Capsule 42.00 14 Mi Rum 0 3.01 LME PA   09/28/2024 09/28/2024 Morphine Sulfate Ir 15 Mg Tab 30.00 5 Mi Rum 0 90.00 MME PA   09/05/2024 09/05/2024 Pregabalin 100 Mg Capsule 90.00 30 Am Yes 0 2.01 LME PA

## 2025-01-13 NOTE — ASSESSMENT & PLAN NOTE
Samantha is a 58 y/o F presenting 4 weeks s/p  weeks s/p removal hardware L2-S1, posterior fusion L5-S1, revision instrumentation L2-pelvis, bilateral S2 alar screws with Dr. Bills and Dr. Stevens (DOS 12/19/24) with concern for wound dehiscence.      Now s/p I&D lumbar spine. Doing well. OR cultures growing proteus in both the deep and superficial cultures. ID changed abx to IV ceftriaxone pending suseptibilities. ID recommending 6wks IV abx     WBAT all extremities  Regular diet  Continue abx per id  ID consulted, appreciate recs   Monitor 2x lumbar drains   Multimodal pain control  PT/OT

## 2025-01-13 NOTE — ASSESSMENT & PLAN NOTE
Remote fusion L2-S1.  Underwent removal hardware L2-S1, posterior fusion L5-S1, revision instrumentation L2 to pelvis; application of bilateral S2 alar screws and bone grafting of the disc at L5-S1 12/19/24.  Complicated by infection as above.

## 2025-01-13 NOTE — ASSESSMENT & PLAN NOTE
Multiple short courses of opioids noted in history.  APS following for assistance with pain management, their recommendations are appreciated. Now signed off

## 2025-01-13 NOTE — ASSESSMENT & PLAN NOTE
Status post removal of hardware L2-S1, posterior fusion L5-S1, revision instrumentation L2-pelvis, bilateral S2 alar screws with orthopedic surgery on 12/19.  Presented with wound dehiscence and concern for abscess.  Now status post I&D lumbar spine 1/10/2025.  Ileus resolved; NGT removed and tolerating oral diet     Multimodal analgesia:  Tylenol 975 mg every 8 hours scheduled  Robaxin 750 mg every 6 hours scheduled  Lidoderm patch 12 hours on/12 hours off to affected area and avoiding incision  Lyrica 150 mg 3 times daily, home medication  Oxycodone 5 mg every 4 hours as needed for moderate pain  Oxycodone 10 mg every 4 hours as needed for severe pain  IV Dilaudid 0.5 mg every 4 hours as needed for severe/breakthrough pain  If pain continues to be well controlled recommend discontinuation of IV opioids in next 24 hours  Narcan as needed for respiratory pression/opioid reversal  Bowel regimen when appropriate from a surgical standpoint to prevent opioid-induced constipation     At time of discharge would be appropriate to continue the current analgesic regimen as outlined about inclusive of short course of current oxycodone regimen.

## 2025-01-13 NOTE — QUICK NOTE
Procedures contacted by nursing due to worsening abdominal distention and pain.  Has not had  bowel  movement since dilatation 1/11.  Will obtain an obstruction series

## 2025-01-13 NOTE — PLAN OF CARE
Problem: PAIN - ADULT  Goal: Verbalizes/displays adequate comfort level or baseline comfort level  Description: Interventions:  - Encourage patient to monitor pain and request assistance  - Assess pain using appropriate pain scale  - Administer analgesics based on type and severity of pain and evaluate response  - Implement non-pharmacological measures as appropriate and evaluate response  - Consider cultural and social influences on pain and pain management  - Notify physician/advanced practitioner if interventions unsuccessful or patient reports new pain  Outcome: Progressing     Problem: INFECTION - ADULT  Goal: Absence or prevention of progression during hospitalization  Description: INTERVENTIONS:  - Assess and monitor for signs and symptoms of infection  - Monitor lab/diagnostic results  - Monitor all insertion sites, i.e. indwelling lines, tubes, and drains  - Monitor endotracheal if appropriate and nasal secretions for changes in amount and color  - Germfask appropriate cooling/warming therapies per order  - Administer medications as ordered  - Instruct and encourage patient and family to use good hand hygiene technique  - Identify and instruct in appropriate isolation precautions for identified infection/condition  Outcome: Progressing

## 2025-01-13 NOTE — PROGRESS NOTES
Progress Note - Orthopedics   Name: Samantha Rodriguez 59 y.o. female I MRN: [id number]  Unit/Bed#: [facility] 615-01 I Date of Admission: 1/9/2025   Date of Service: 1/13/2025 I Hospital Day: 4    Assessment & Plan  Lumbar radiculopathy  See above  Class 2 obesity without serious comorbidity with body mass index (BMI) of 39.0 to 39.9 in adult    Wound infection complicating hardware (HCC)  Samantha is a 58 y/o F presenting 4 weeks s/p  weeks s/p removal hardware L2-S1, posterior fusion L5-S1, revision instrumentation L2-pelvis, bilateral S2 alar screws with Dr. Bills and Dr. Stevens (DOS 12/19/24) with concern for wound dehiscence.      Now s/p I&D lumbar spine. Doing well. OR cultures growing proteus in both the deep and superficial cultures. ID changed abx to IV ceftriaxone pending suseptibilities. ID recommending 6wks IV abx     WBAT all extremities  Regular diet  Continue abx per id  ID consulted, appreciate recs   Monitor 2x lumbar drains   Multimodal pain control  PT/OT        Subjective   59 y.o.female doing well. No acute events, no new complaints. Pain well controlled. Denies fevers, chills, CP, SOB, N/V, numbness or tingling. Patient reports no issues with urination or bowel movements.     Objective :  Temp:  [98.2 °F (36.8 °C)-99.1 °F (37.3 °C)] 98.7 °F (37.1 °C)  HR:  [59-77] 65  BP: (105-119)/(61-76) 113/70  Resp:  [16-23] 16  SpO2:  [95 %-99 %] 98 %  O2 Device: None (Room air)    Physical Exam  Musculoskeletal: bilateral lower  Skin intact . No erythema or ecchymosis.  Dressing c/d/i  TTP sona-incisional   Motor intact to +FHL/EHL, +ankle dorsi/plantar flexion  2+ DP pulse  Sensation intact L3-S1  Motor intact L3-S1  Drains in place with ss op      Lab Results: I have reviewed the following results:  Recent Labs     01/11/25  0842 01/12/25  0843   WBC 7.95 7.12   HGB 8.2* 9.1*   HCT 26.3* 29.8*   * 470*   BUN 15 15   CREATININE 0.76 0.65     Blood Culture:    Lab Results   Component Value Date     "BLOODCX No Growth After 5 Days. 01/03/2025    BLOODCX No Growth After 5 Days. 01/03/2025     Wound Culture: No results found for: \"WOUNDCULT\"  "

## 2025-01-13 NOTE — PROCEDURES
Insert Complex Venous Access Line    Date/Time: 1/13/2025 11:17 AM    Performed by: Miryam Cadena RN  Authorized by: MERCY Lindsay    Patient location:  Bedside  Other Assisting Provider: Yes (comment) (ROBERT Chung)    Consent:     Consent obtained:  Written    Consent given by:  Patient    Risks discussed:  Arterial puncture, incorrect placement, pneumothorax, nerve damage, bleeding and infection    Alternatives discussed:  No treatment  Universal protocol:     Procedure explained and questions answered to patient or proxy's satisfaction: yes      Immediately prior to procedure, a time out was called: yes      Site/side marked: yes      Patient identity confirmed:  Verbally with patient, arm band, provided demographic data and hospital-assigned identification number  Pre-procedure details:     Hand hygiene: Hand hygiene performed prior to insertion      Sterile barrier technique: All elements of maximal sterile technique followed      Skin preparation:  ChloraPrep    Skin preparation agent: Skin preparation agent completely dried prior to procedure    Procedure details:     Complex Venous Access Line Type: PICC      Complex Venous Access Line Indications: long term antibiotics      Catheter tip vessel location: atriocaval junction      Orientation:  Left    Location:  Basilic    Procedural supplies:  Single lumen    Catheter size:  4 Fr    Total catheter length (cm):  40    Catheter out on skin (cm):  0    Max flow rate:  999    Arm circumference:  38    Patient evaluated for contraindications to access (i.e. fistula, thrombosis, etc): Yes      Site selection rationale:  Pt preferred left arm, basilic largest most patent vessel    Approach: percutaneous technique used      Patient position:  Flat    Ultrasound image availability:  Not saved    Sterile ultrasound techniques: Sterile gel and sterile probe covers were used      Number of attempts:  1    Successful placement: yes      Landmarks  identified: yes      Vessel of catheter tip end:  Sherlock 3CG confirmed  Anesthesia (see MAR for exact dosages):     Anesthesia method:  Local infiltration    Local anesthetic:  Lidocaine 1% w/o epi (2 ml)  Post-procedure details:     Post-procedure:  Dressing applied and securement device placed    Assessment:  Blood return through all ports and free fluid flow    Post-procedure complications: none      Patient tolerance of procedure:  Tolerated well, no immediate complications

## 2025-01-13 NOTE — UTILIZATION REVIEW
NOTIFICATION OF INPATIENT ADMISSION      AUTHORIZATION REQUEST   SERVICING FACILITY:   [facility]  Address: [address] [place] [address]  Tax ID: [id number]  NPI: [id number] ATTENDING PROVIDER:  Attending Name and NPI#: Bettye Sierra Md [[id number]]  Address: [address] [place] [address]  Phone: [phone]   ADMISSION INFORMATION:  Place of Service: Inpatient [facility]  Place of Service Code: 21  Inpatient Admission Date/Time: 1/9/25 11:58 PM  Discharge Date/Time: No discharge date for patient encounter.  Admitting Diagnosis Code/Description:  Wound dehiscence [T81.30XA]     UTILIZATION REVIEW CONTACT:  Christiana Xiong, Utilization   Network Utilization Review Department  Phone: [phone]  Fax: [phone]  Email: [email or web address]  Contact for approvals/pending authorizations, clinical reviews, and discharge.     PHYSICIAN ADVISORY SERVICES:  Medical Necessity Denial & Cokj-sg-Lnvi Review  Phone: [phone]  Fax: [phone]  Email: [email or web address]     DISCHARGE SUPPORT TEAM:  For Patients Discharge Needs & Updates  Phone: [phone] opt. 2 Fax: [phone]  Email: [email or web address]

## 2025-01-13 NOTE — PROGRESS NOTES
Progress Note - Hospitalist   Name: Samantha Rodriguez 59 y.o. female I MRN: [id number]  [facility] I Date of Admission: 1/9/2025   Date of Service: 1/13/2025 I Hospital Day: 4    Assessment & Plan  Wound infection complicating hardware (HCC)  Initially presented to [facility] 1/2 with ambulatory dysfunction.  Recently discharged from rehab following recent back surgery in December 2024.  On admission to [facility], patient met criteria and was diagnosed with UTI and RSV.  Patient also noted to have small area of wound dehiscence at the distal aspect of the wound from recent surgical hardware removal.  CT was ordered which showed ill-defined fluid with pockets of soft tissue gas and surrounding inflammatory stranding in the midline lower back suspicious for an abscess therefore patient was transferred to [place] for orthopedic evaluation.  Recent blood cultures 1/3 negative  S/P I&D lumbar spine 1/10 with ortho. Cloudy fluid noted superficially and fluid tracking deeper to the hardware.   Drain x 2 in place  Cultures growing proteus  IV Ancef switched to IV Ceftriaxone 1/12 per ID  Will require prolonged course of IV abx   Will order PICC line placement   Pain control per APS recommendations: ATC APAP, lido patch, scheduled Robaxin, as needed oxycodone with IV Dilaudid for breakthrough. APS signed off. If pain remains well controlled D/C IC Dilaudid in next 24 hours.   PT/OT recommending rehab.   Ileus (HCC)  Noted to have worsening epigastric sharp pain that started 1/8 overnight with an episode of vomiting  XR abdomen shows severely dilated loops concerning for SBO or ileus  CT A/P shows dilated loops of small bowel suggestive of underlying SBO with transition point in the central abdomen where there is slight swirling of the mesenteric vessel and underlying internal hernia cannot be fully excluded.  Fluid seen in the colon suggestive of diarrheal illness without colon wall thickening to suggest  colitis.  Cholelithiasis.  Small ascites.  Previously noted neurostimulator leads have been removed in the interim, does appear to have small retained fragment in the posterior lower back.  Ill-defined fluid in the pocket of the soft tissue gas and surrounding inflammatory stranding in the midline low back suspicious for abscess.  NG tube was placed, was evaluated by general surgery.  Bowel function returned prior to the OR, was cleared for OR.  NG tube is now out, tolerating p.o.  Abdominal exam benign.  Holding bowel regimen for now  Monitor  Lumbar radiculopathy  S/p removal hardware L2-S1, posterior fusion L5-S1, revision instrumentation L2-pelvis, bilateral S2 alar screws with orthopedic surgery on 12/19. Now dehiscence and concern for abscess as above.  Status post I&D as above  Pain control as above  Chronic pain disorder    Multiple short courses of opioids noted in history.  APS following for assistance with pain management, their recommendations are appreciated. Now signed off  Bipolar disorder (HCC)  History of severe MDD and AMAURY with panic attacks  Continue PTA medications with Abilify, Zoloft, BuSpar, Minipress, trazodone and Atarax  GERD without esophagitis  Continue PPI  Essential hypertension  BP stable  Continue amlodipine and prazosin  Tardive dyskinesia  Patient is on Ingrezza which is nonformulary, Substitute with Cogentin 1 mg twice daily for now  Will have family bring it to be given if able   Outpatient follow-up with psychiatry and neurology  Ambulatory dysfunction  Patient reports after getting back from rehab she has been struggling to walk secondary to her back.  Typically uses walker  Recommended rehab as above   History of CVA (cerebrovascular accident)  Continue aspirin and statin  Anemia  Prior hemoglobin range of 12-13 in 2023 however most recently in the 9s since her surgery in December  Hemoglobin currently stable in the 9s  Monitor  RSV bronchitis  1/3 RSV positive  Chest x-ray  without acute findings  Completed 5 days of steroid therapy  Symptoms resolved   History of UTI  Treated with 3 days of IV Ancef for E. coli prior to transfer  Class 2 obesity without serious comorbidity with body mass index (BMI) of 39.0 to 39.9 in adult  Weight loss/lifestyle modifications as outpatient     VTE Pharmacologic Prophylaxis:   High Risk (Score >/= 5) - Pharmacological DVT Prophylaxis Ordered: enoxaparin (Lovenox). Sequential Compression Devices Ordered.    Mobility:   Basic Mobility Inpatient Raw Score: 17  JH-HLM Goal: 5: Stand one or more mins  JH-HLM Achieved: 5: Stand (1 or more minutes)  JH-HLM Goal achieved. Continue to encourage appropriate mobility.    Patient Centered Rounds: I performed bedside rounds with nursing staff today.   Discussions with Specialists or Other Care Team Provider: case management, ortho    Education and Discussions with Family / Patient: Updated  (sister) via phone.    Current Length of Stay: 4 day(s)  Current Patient Status: Inpatient   Certification Statement: The patient will continue to require additional inpatient hospital stay due to IV antibiotics  Discharge Plan: Anticipate discharge in 48-72 hrs to rehab facility. Pending plan for drain management    Code Status: Level 1 - Full Code    Subjective   Feels bloated. Eating without difficulty. Pain controlled.     Objective :  Temp:  [98 °F (36.7 °C)-99.1 °F (37.3 °C)] 98 °F (36.7 °C)  HR:  [65-78] 78  BP: (105-116)/(62-70) 116/67  Resp:  [16-21] 18  SpO2:  [98 %-100 %] 100 %  O2 Device: None (Room air)    Body mass index is 40.13 kg/m².     Input and Output Summary (last 24 hours):     Intake/Output Summary (Last 24 hours) at 1/13/2025 1341  Last data filed at 1/13/2025 1201  Gross per 24 hour   Intake 770 ml   Output 180 ml   Net 590 ml       Physical Exam  Vitals and nursing note reviewed.   Constitutional:       General: She is not in acute distress.     Appearance: She is well-developed.   HENT:       Head: Normocephalic and atraumatic.   Cardiovascular:      Rate and Rhythm: Normal rate and regular rhythm.      Heart sounds: No murmur heard.     No friction rub.   Pulmonary:      Effort: Pulmonary effort is normal. No respiratory distress.      Breath sounds: Normal breath sounds. No wheezing.   Abdominal:      General: Bowel sounds are normal. There is no distension.      Palpations: Abdomen is soft.      Tenderness: There is no abdominal tenderness. There is no guarding or rebound.   Skin:     General: Skin is warm and dry.      Findings: No rash.   Neurological:      Mental Status: She is alert and oriented to person, place, and time.      Cranial Nerves: No cranial nerve deficit.      Comments: Tardive dyskinesia            Lines/Drains:  Lines/Drains/Airways       Active Status       Name Placement date Placement time Site Days    PICC Line 01/13/25 Left Basilic 01/13/25  1118  Basilic  less than 1    Closed/Suction Drain Right Back Bulb 19 Fr. 01/10/25  1234  Back  3    Closed/Suction Drain Left Back Bulb 19 Fr. 01/10/25  1234  Back  3                    Central Line:  Goal for removal: N/A - Discharging with PICC for IV ABX/medications               Lab Results: I have reviewed the following results:   Results from last 7 days   Lab Units 01/13/25  0509   WBC Thousand/uL 5.51   HEMOGLOBIN g/dL 8.1*   HEMATOCRIT % 26.7*   PLATELETS Thousands/uL 452*   SEGS PCT % 52   LYMPHO PCT % 29   MONO PCT % 7   EOS PCT % 10*     Results from last 7 days   Lab Units 01/13/25  0509   SODIUM mmol/L 139   POTASSIUM mmol/L 3.8   CHLORIDE mmol/L 106   CO2 mmol/L 28   BUN mg/dL 12   CREATININE mg/dL 0.48*   ANION GAP mmol/L 5   CALCIUM mg/dL 7.4*   GLUCOSE RANDOM mg/dL 88         Results from last 7 days   Lab Units 01/10/25  1427   POC GLUCOSE mg/dl 88         Results from last 7 days   Lab Units 01/10/25  1101   LACTIC ACID mmol/L 0.5       Recent Cultures (last 7 days):   Results from last 7 days   Lab Units  01/10/25  1227 01/10/25  1220   GRAM STAIN RESULT  No Polys or Bacteria seen 3+ Polys  No organisms seen         Last 24 Hours Medication List:     Current Facility-Administered Medications:     acetaminophen (TYLENOL) tablet 975 mg, Q8H KESHIA    albuterol (PROVENTIL HFA,VENTOLIN HFA) inhaler 2 puff, Q4H PRN    aluminum-magnesium hydroxide-simethicone (MAALOX) oral suspension 30 mL, Q4H PRN    amLODIPine (NORVASC) tablet 5 mg, Daily    ARIPiprazole (ABILIFY) tablet 10 mg, Daily    aspirin (ECOTRIN LOW STRENGTH) EC tablet 81 mg, Daily    atorvastatin (LIPITOR) tablet 40 mg, Daily With Dinner    benztropine (COGENTIN) tablet 1 mg, BID    busPIRone (BUSPAR) tablet 15 mg, TID    cefTRIAXone (ROCEPHIN) 2,000 mg in dextrose 5 % 50 mL IVPB, Q24H, Last Rate: Stopped (01/13/25 1036)    dextromethorphan-guaiFENesin (ROBITUSSIN DM) oral syrup 10 mL, Q4H PRN    enoxaparin (LOVENOX) subcutaneous injection 40 mg, Daily    HYDROmorphone (DILAUDID) injection 0.5 mg, Q4H PRN    hydrOXYzine HCL (ATARAX) tablet 25 mg, TID    lidocaine (LIDODERM) 5 % patch 1 patch, Daily    [Held by provider] lubiprostone (AMITIZA) capsule 8 mcg, BID    methocarbamol (ROBAXIN) tablet 750 mg, Q6H KESHIA    naloxone (NARCAN) 0.04 mg/mL syringe 0.04 mg, Q1MIN PRN    ondansetron (ZOFRAN) injection 4 mg, Q6H PRN    oxyCODONE (ROXICODONE) immediate release tablet 10 mg, Q4H PRN    oxyCODONE (ROXICODONE) IR tablet 5 mg, Q4H PRN    pantoprazole (PROTONIX) EC tablet 40 mg, BID AC    phenol (CHLORASEPTIC) 1.4 % mucosal liquid 1 spray, Q2H PRN    pneumococcal 20-cheryl conj vacc (PREVNAR 20) IM Injection 0.5 mL, Once PRN    prazosin (MINIPRESS) capsule 2 mg, HS    pregabalin (LYRICA) capsule 150 mg, TID    sertraline (ZOLOFT) tablet 200 mg, HS    traZODone (DESYREL) tablet 300 mg, HS PRN    Administrative Statements   Today, Patient Was Seen By: Lou Reed PA-C      **Please Note: This note may have been constructed using a voice recognition system.**

## 2025-01-13 NOTE — PROGRESS NOTES
"Progress Note - Acute Pain   Name: Samantha Rodriguez 59 y.o. female I MRN: [id number]  Unit/Bed#: [facility] 615-01 I Date of Admission: 1/9/2025   Date of Service: 1/13/2025 I Hospital Day: 4    Assessment & Plan  Chronic pain disorder  Patient with hx of chronic pain  Multiple short prescriptions of opioids over the past few months    Outpatient regimen includes:  MS IR 15 mg tablets twice daily as needed (recently filled 12/30/2024 #30tabs/15days)  Diclofenac sodium 2 g gel 4 times daily as needed  Cyclobenzaprine 10 mg 3 times daily as needed  Lyrica 150 mg 3 times daily    PDMP review:  Filled  Written  Drug  QTY  Days  Prescriber  Refill  Daily Dose*      12/30/2024 12/30/2024 Pregabalin 150 Mg Capsule 45.00 15 Mi Rum 0 3.01 LME PA   12/30/2024 12/30/2024 Morphine Sulfate Ir 15 Mg Tab 30.00 15 Mi Rum 0 30.00 MME PA   12/03/2024 12/02/2024 Morphine Sulfate Ir 15 Mg Tab 60.00 30 Ch shelter 0 30.00 MME PA   12/03/2024 12/02/2024 Pregabalin 150 Mg Capsule 90.00 30 Ch shelter 0 3.01 LME PA   11/13/2024 10/22/2024 Morphine Sulfate Ir 15 Mg Tab 24.00 4 Mi Rum 0 90.00 MME PA   11/08/2024 11/08/2024 Pregabalin 150 Mg Capsule 90.00 30 Mi Rum 0 3.01 LME PA   11/03/2024 09/28/2024 Pregabalin 150 Mg Capsule 6.00 2 Mi Rum 0 3.01 LME PA   11/03/2024 10/22/2024 Morphine Sulfate Ir 15 Mg Tab 30.00 5 Mi Rum 0 90.00 MME PA   10/23/2024 10/22/2024 Morphine Sulfate Ir 15 Mg Tab 30.00 5 Mi Rum 0 90.00 MME PA   10/22/2024 10/10/2024 Morphine Sulfate Ir 15 Mg Tab 6.00 1 Mi Rum 0 90.00 MME PA   10/12/2024 09/28/2024 Pregabalin 150 Mg Capsule 42.00 14 Mi Rum 0 3.01 LME PA   10/10/2024 10/10/2024 Morphine Sulfate Ir 15 Mg Tab 24.00 4 Mi Rum 0 90.00 MME PA   09/28/2024 09/28/2024 Pregabalin 150 Mg Capsule 42.00 14 Mi Rum 0 3.01 LME PA   09/28/2024 09/28/2024 Morphine Sulfate Ir 15 Mg Tab 30.00 5 Mi Rum 0 90.00 MME PA   09/05/2024 09/05/2024 Pregabalin 100 Mg Capsule 90.00 30 Am Yes 0 2.01 LME PA     Ileus (HCC)  Per gen surg: \"Patient with CT scan " "concerning for SBO, however given patient's presentation this is more likely an ileus. Patient has begun having bowel function, and has had complete resolution of her symptoms. \"    NGT remains in place to suction and patient remains NPO.  Class 2 obesity without serious comorbidity with body mass index (BMI) of 39.0 to 39.9 in adult    Wound infection complicating hardware (HCC)  Status post removal of hardware L2-S1, posterior fusion L5-S1, revision instrumentation L2-pelvis, bilateral S2 alar screws with orthopedic surgery on 12/19.  Presented with wound dehiscence and concern for abscess.  Now status post I&D lumbar spine 1/10/2025.  Ileus resolved; NGT removed and tolerating oral diet     Multimodal analgesia:  Tylenol 975 mg every 8 hours scheduled  Robaxin 750 mg every 6 hours scheduled  Lidoderm patch 12 hours on/12 hours off to affected area and avoiding incision  Lyrica 150 mg 3 times daily, home medication  Oxycodone 5 mg every 4 hours as needed for moderate pain  Oxycodone 10 mg every 4 hours as needed for severe pain  IV Dilaudid 0.5 mg every 4 hours as needed for severe/breakthrough pain  If pain continues to be well controlled recommend discontinuation of IV opioids in next 24 hours  Narcan as needed for respiratory pression/opioid reversal  Bowel regimen when appropriate from a surgical standpoint to prevent opioid-induced constipation     At time of discharge would be appropriate to continue the current analgesic regimen as outlined about inclusive of short course of current oxycodone regimen.    APS will sign off at this time. Thank you for the consult. All opioids and other analgesics to be written at discretion of primary team. Please contact Acute Pain Service - via SecureChat from [phone] with additional questions or concerns. See SecureDatapipet or Wheretoget for additional contacts and after hours information.    Subjective   Samantha Rodriguez is a 59 y.o. female with history of recent lumbar surgery " 12/19/2024 in setting of lumbar radiculopathy.  Initially was hospitalized at the [facility] ambulatory dysfunction and RSV infection.  Patient also found to have wound dehiscence and imaging suspicious for abscess.  She underwent I&D of lumbar spine with orthopedics 1/10/2025 for which APS was consulted for assistance in postoperative pain management.     Pain History  Current pain location(s):  Pain Score: 8  Pain Location/Orientation: Location: Back  Pain Scale: Pain Assessment Tool: 0-10  24 hour history: Seen at bedside this afternoon. Resting in bed without acute distress secondary to pain. Reports her back pain has been relatively well controlled on current regimen and oral oxycodone has been effective in alleviating pain. Tolerating current regimen without adverse effects.     Opioid requirement previous 24 hours:   10 mg oxycodone x4 doses  0.5 mg IV dilaudid    Meds/Allergies   all current active meds have been reviewed, current meds:   Current Facility-Administered Medications:     acetaminophen (TYLENOL) tablet 975 mg, Q8H KESHIA    albuterol (PROVENTIL HFA,VENTOLIN HFA) inhaler 2 puff, Q4H PRN    aluminum-magnesium hydroxide-simethicone (MAALOX) oral suspension 30 mL, Q4H PRN    amLODIPine (NORVASC) tablet 5 mg, Daily    ARIPiprazole (ABILIFY) tablet 10 mg, Daily    aspirin (ECOTRIN LOW STRENGTH) EC tablet 81 mg, Daily    atorvastatin (LIPITOR) tablet 40 mg, Daily With Dinner    benztropine (COGENTIN) tablet 1 mg, BID    busPIRone (BUSPAR) tablet 15 mg, TID    cefTRIAXone (ROCEPHIN) 2,000 mg in dextrose 5 % 50 mL IVPB, Q24H, Last Rate: Stopped (01/13/25 1036)    dextromethorphan-guaiFENesin (ROBITUSSIN DM) oral syrup 10 mL, Q4H PRN    enoxaparin (LOVENOX) subcutaneous injection 40 mg, Daily    HYDROmorphone (DILAUDID) injection 0.5 mg, Q4H PRN    hydrOXYzine HCL (ATARAX) tablet 25 mg, TID    lidocaine (LIDODERM) 5 % patch 1 patch, Daily    [Held by provider] lubiprostone (AMITIZA) capsule 8 mcg,  BID    methocarbamol (ROBAXIN) tablet 750 mg, Q6H KESHIA    naloxone (NARCAN) 0.04 mg/mL syringe 0.04 mg, Q1MIN PRN    ondansetron (ZOFRAN) injection 4 mg, Q6H PRN    oxyCODONE (ROXICODONE) immediate release tablet 10 mg, Q4H PRN    oxyCODONE (ROXICODONE) IR tablet 5 mg, Q4H PRN    pantoprazole (PROTONIX) EC tablet 40 mg, BID AC    phenol (CHLORASEPTIC) 1.4 % mucosal liquid 1 spray, Q2H PRN    pneumococcal 20-cheryl conj vacc (PREVNAR 20) IM Injection 0.5 mL, Once PRN    prazosin (MINIPRESS) capsule 2 mg, HS    pregabalin (LYRICA) capsule 150 mg, TID    sertraline (ZOLOFT) tablet 200 mg, HS    traZODone (DESYREL) tablet 300 mg, HS PRN, and PTA meds:   Prior to Admission Medications   Prescriptions Last Dose Informant Patient Reported? Taking?   ARIPiprazole (ABILIFY) 10 mg tablet   No No   Sig: Take 1 tablet (10 mg total) by mouth daily   Cholecalciferol (VITAMIN D3) 1,000 units tablet   No No   Sig: Take 1 tablet (1,000 Units total) by mouth daily   Diclofenac Sodium (VOLTAREN) 1 %  Outside Facility (Specify) No No   Sig: Apply 2 g topically 4 (four) times a day as needed (pain)   Valbenazine Tosylate (Ingrezza) 60 MG CAPS   No No   Sig: Take 1 capsule by mouth in the morning   acetaminophen (TYLENOL) 325 mg tablet   No No   Sig: Take 2 tablets (650 mg total) by mouth every 4 (four) hours as needed for moderate pain   aluminum-magnesium hydroxide-simethicone (MAALOX) 9614-5789-977 mg/30 mL suspension   No No   Sig: Take 30 mL by mouth in the morning   amLODIPine (NORVASC) 5 mg tablet   No No   Sig: Take 1 tablet (5 mg total) by mouth daily   aspirin (Aspirin Low Dose) 81 mg EC tablet   No No   Sig: Take 1 tablet (81 mg total) by mouth daily   atorvastatin (LIPITOR) 40 mg tablet   No No   Sig: Take 1 tablet (40 mg total) by mouth daily with dinner   bisacodyl (DULCOLAX) 10 mg suppository   Yes No   Sig: Insert 10 mg into the rectum as needed for constipation As needed on day 5 of no bowel movement   busPIRone (BUSPAR) 15  mg tablet   No No   Sig: Take 1 tablet (15 mg total) by mouth 3 (three) times a day   cyclobenzaprine (FLEXERIL) 10 mg tablet   No No   Sig: Take 1 tablet (10 mg total) by mouth 3 (three) times a day as needed for muscle spasms   hydrOXYzine HCL (ATARAX) 25 mg tablet   No No   Sig: Take 1 tablet (25 mg total) by mouth 3 (three) times a day   loratadine (CLARITIN) 10 mg tablet   No No   Sig: Take 1 tablet (10 mg total) by mouth daily   lubiprostone (AMITIZA) 8 mcg capsule   No No   Sig: Take 1 capsule (8 mcg total) by mouth 2 (two) times a day   magnesium Oxide (MAG-OX) 400 mg TABS   No No   Sig: Take 1 tablet (400 mg total) by mouth daily   morphine (MSIR) 15 mg tablet   No No   Sig: Take 1 tablet (15 mg total) by mouth 2 (two) times a day Max Daily Amount: 30 mg   morphine (MSIR) 15 mg tablet   No No   Sig: Take 1 tablet (15 mg total) by mouth every 6 (six) hours as needed for severe pain (Please increase dose from 2x per day to 3x per day.) for up to 30 doses Max Daily Amount: 60 mg   naloxone (NARCAN) 4 mg/0.1 mL nasal spray   No No   Sig: Administer 1 spray into a nostril. If no response after 2-3 minutes, give another dose in the other nostril using a new spray.   ondansetron (ZOFRAN) 4 mg tablet   Yes No   pantoprazole (PROTONIX) 40 mg tablet   No No   Sig: Take 1 tablet (40 mg total) by mouth daily in the early morning   prazosin (MINIPRESS) 2 mg capsule   No No   Sig: Take 1 capsule (2 mg total) by mouth daily at bedtime   pregabalin (LYRICA) 150 mg capsule   No No   Sig: Take 1 capsule (150 mg total) by mouth 3 (three) times a day   senna-docusate sodium (SENOKOT S) 8.6-50 mg per tablet   No No   Sig: Take 1 tablet by mouth daily as needed for constipation   sertraline (ZOLOFT) 100 mg tablet   No No   Sig: Take 2 tablets (200 mg total) by mouth daily at bedtime   sodium phosphate (PEDIA-LAX) 3.5-9.5 g 59 mL enema   Yes No   Sig: Insert 1 enema into the rectum once   tirzepatide (Zepbound) 2.5 mg/0.5 mL  auto-injector   Yes No   Sig: Inject 2.5 mg under the skin Once a week   traZODone (DESYREL) 300 MG tablet   No No   Sig: Take 1 tablet (300 mg total) by mouth daily at bedtime      Facility-Administered Medications: None     No Known Allergies  Objective :  Temp:  [98 °F (36.7 °C)-99.1 °F (37.3 °C)] 98 °F (36.7 °C)  HR:  [65-78] 78  BP: (105-116)/(62-70) 116/67  Resp:  [16-21] 18  SpO2:  [98 %-100 %] 100 %  O2 Device: None (Room air)    Physical Exam  Vitals reviewed.   Constitutional:       General: She is not in acute distress.  Cardiovascular:      Rate and Rhythm: Normal rate.   Pulmonary:      Effort: Pulmonary effort is normal.   Chest:      Chest wall: No tenderness.   Abdominal:      Tenderness: There is no abdominal tenderness.   Musculoskeletal:         General: Tenderness (low back) present.      Cervical back: Normal range of motion.   Skin:     General: Skin is warm and dry.   Neurological:      Mental Status: She is alert and oriented to person, place, and time. Mental status is at baseline.   Psychiatric:         Mood and Affect: Mood normal.         Behavior: Behavior normal.            Lab Results: I have reviewed the following results:  Estimated Creatinine Clearance: 125.5 mL/min (A) (by C-G formula based on SCr of 0.48 mg/dL (L)).  Lab Results   Component Value Date    WBC 5.51 01/13/2025    WBC 4.50 10/26/2015    HGB 8.1 (L) 01/13/2025    HGB 14.9 10/26/2015    HCT 26.7 (L) 01/13/2025    HCT 41.9 10/26/2015     (H) 01/13/2025     10/26/2015         Component Value Date/Time     07/27/2015 1206    K 3.8 01/13/2025 0509    K 3.2 (L) 10/16/2024 2317     01/13/2025 0509     10/16/2024 2317    CO2 28 01/13/2025 0509    CO2 25 12/19/2024 1155    CO2 27 10/16/2024 2317    BUN 12 01/13/2025 0509    BUN 7 10/16/2024 2317    CREATININE 0.48 (L) 01/13/2025 0509    CREATININE 0.65 10/16/2024 2317         Component Value Date/Time    CALCIUM 7.4 (L) 01/13/2025 0505    CALCIUM  9.6 10/16/2024 2317    ALKPHOS 69 12/24/2024 0448    ALKPHOS 110 10/16/2024 2317    AST 18 12/24/2024 0448    AST 19 10/16/2024 2317    ALT 13 12/24/2024 0448    ALT 18 10/16/2024 2317    BILITOT 0.51 02/22/2014 0615    TP 5.5 (L) 12/24/2024 0448    TP 7.7 10/16/2024 2317    ALB 3.3 (L) 12/24/2024 0448    ALB 4.6 10/16/2024 2317       Imaging Results Review: No pertinent imaging studies reviewed.  Other Study Results Review: No additional pertinent studies reviewed.        No pertinent past medical history <<----- Click to add NO pertinent Past Medical History

## 2025-01-13 NOTE — ASSESSMENT & PLAN NOTE
Initially presented to [facility] 1/2 with ambulatory dysfunction.  Recently discharged from rehab following recent back surgery in December 2024.  On admission to [place], patient met criteria and was diagnosed with UTI and RSV.  Patient also noted to have small area of wound dehiscence at the distal aspect of the wound from recent surgical hardware removal.  CT was ordered which showed ill-defined fluid with pockets of soft tissue gas and surrounding inflammatory stranding in the midline lower back suspicious for an abscess therefore patient was transferred to [place] for orthopedic evaluation.  Recent blood cultures 1/3 negative  S/P I&D lumbar spine 1/10 with ortho. Cloudy fluid noted superficially and fluid tracking deeper to the hardware.   Drain x 2 in place  Cultures growing proteus  IV Ancef switched to IV Ceftriaxone 1/12 per ID  Will require prolonged course of IV abx   Will order PICC line placement   Pain control per APS recommendations: ATC APAP, lido patch, scheduled Robaxin, as needed oxycodone with IV Dilaudid for breakthrough. APS signed off. If pain remains well controlled D/C IC Dilaudid in next 24 hours.   PT/OT recommending rehab.

## 2025-01-13 NOTE — ASSESSMENT & PLAN NOTE
Presenting with delayed wound dehiscence in setting of recent fusion surgery as below.  Status post I&D 1/10.25.  Intraoperative findings notable for wound dehiscence to level of fascia which appeared intact.  Fascia was opened and deep space was explored.  Superficial and deep cultures grew Proteus.  Concern for HW involvement.    Continue ceftriaxone 2g IV Q24 for 6 weeks postop given concenr for HW infection  Monitor closely while on antibiotics for toxicities including diarrhea, rash, cytopenias, or kidney injury  Check weekly CBC-diff, CMP while on IV antibiotics to monitor for toxicities.  D/C planning for STR  Outpatient follow-up with ID 2 weeks after D/C  D/C PICC after last dose of IV antibiotics  Given retained HW, will likely need suppressive antibiotics after IV course complete

## 2025-01-14 ENCOUNTER — APPOINTMENT (INPATIENT)
Dept: RADIOLOGY | Facility: HOSPITAL | Age: 60
DRG: 857 | End: 2025-01-14
Payer: MEDICARE

## 2025-01-14 ENCOUNTER — ANESTHESIA EVENT (INPATIENT)
Dept: PERIOP | Facility: HOSPITAL | Age: 60
DRG: 857 | End: 2025-01-14
Payer: MEDICARE

## 2025-01-14 ENCOUNTER — ANESTHESIA (INPATIENT)
Dept: PERIOP | Facility: HOSPITAL | Age: 60
DRG: 857 | End: 2025-01-14
Payer: MEDICARE

## 2025-01-14 LAB
ABO GROUP BLD: NORMAL
ANION GAP SERPL CALCULATED.3IONS-SCNC: 8 MMOL/L (ref 4–13)
BLD GP AB SCN SERPL QL: NEGATIVE
BUN SERPL-MCNC: 9 MG/DL (ref 5–25)
CALCIUM SERPL-MCNC: 8.1 MG/DL (ref 8.4–10.2)
CHLORIDE SERPL-SCNC: 104 MMOL/L (ref 96–108)
CO2 SERPL-SCNC: 29 MMOL/L (ref 21–32)
CREAT SERPL-MCNC: 0.51 MG/DL (ref 0.6–1.3)
ERYTHROCYTE [DISTWIDTH] IN BLOOD BY AUTOMATED COUNT: 15.5 % (ref 11.6–15.1)
GFR SERPL CREATININE-BSD FRML MDRD: 104 ML/MIN/1.73SQ M
GLUCOSE SERPL-MCNC: 100 MG/DL (ref 65–140)
HCT VFR BLD AUTO: 31 % (ref 34.8–46.1)
HGB BLD-MCNC: 9 G/DL (ref 11.5–15.4)
LACTATE SERPL-SCNC: 1.2 MMOL/L (ref 0.5–2)
MCH RBC QN AUTO: 26.5 PG (ref 26.8–34.3)
MCHC RBC AUTO-ENTMCNC: 29 G/DL (ref 31.4–37.4)
MCV RBC AUTO: 91 FL (ref 82–98)
PLATELET # BLD AUTO: 465 THOUSANDS/UL (ref 149–390)
PMV BLD AUTO: 9.9 FL (ref 8.9–12.7)
POTASSIUM SERPL-SCNC: 3.7 MMOL/L (ref 3.5–5.3)
RBC # BLD AUTO: 3.39 MILLION/UL (ref 3.81–5.12)
RH BLD: NEGATIVE
SODIUM SERPL-SCNC: 141 MMOL/L (ref 135–147)
SPECIMEN EXPIRATION DATE: NORMAL
WBC # BLD AUTO: 6.58 THOUSAND/UL (ref 4.31–10.16)

## 2025-01-14 PROCEDURE — 86850 RBC ANTIBODY SCREEN: CPT | Performed by: NURSE ANESTHETIST, CERTIFIED REGISTERED

## 2025-01-14 PROCEDURE — 99232 SBSQ HOSP IP/OBS MODERATE 35: CPT | Performed by: INTERNAL MEDICINE

## 2025-01-14 PROCEDURE — 0DNA0ZZ RELEASE JEJUNUM, OPEN APPROACH: ICD-10-PCS | Performed by: SURGERY

## 2025-01-14 PROCEDURE — 74177 CT ABD & PELVIS W/CONTRAST: CPT

## 2025-01-14 PROCEDURE — 99232 SBSQ HOSP IP/OBS MODERATE 35: CPT | Performed by: PHYSICIAN ASSISTANT

## 2025-01-14 PROCEDURE — 0DNU0ZZ RELEASE OMENTUM, OPEN APPROACH: ICD-10-PCS | Performed by: SURGERY

## 2025-01-14 PROCEDURE — 99024 POSTOP FOLLOW-UP VISIT: CPT | Performed by: ORTHOPAEDIC SURGERY

## 2025-01-14 PROCEDURE — 86900 BLOOD TYPING SEROLOGIC ABO: CPT | Performed by: NURSE ANESTHETIST, CERTIFIED REGISTERED

## 2025-01-14 PROCEDURE — 80048 BASIC METABOLIC PNL TOTAL CA: CPT | Performed by: PHYSICIAN ASSISTANT

## 2025-01-14 PROCEDURE — 99024 POSTOP FOLLOW-UP VISIT: CPT | Performed by: SURGERY

## 2025-01-14 PROCEDURE — G0545 PR INHERENT VISIT TO INPT: HCPCS | Performed by: INTERNAL MEDICINE

## 2025-01-14 PROCEDURE — 86901 BLOOD TYPING SEROLOGIC RH(D): CPT | Performed by: NURSE ANESTHETIST, CERTIFIED REGISTERED

## 2025-01-14 PROCEDURE — 85027 COMPLETE CBC AUTOMATED: CPT | Performed by: PHYSICIAN ASSISTANT

## 2025-01-14 PROCEDURE — 83605 ASSAY OF LACTIC ACID: CPT

## 2025-01-14 PROCEDURE — 44005 FREEING OF BOWEL ADHESION: CPT | Performed by: SURGERY

## 2025-01-14 RX ORDER — LIDOCAINE HYDROCHLORIDE 10 MG/ML
INJECTION, SOLUTION EPIDURAL; INFILTRATION; INTRACAUDAL; PERINEURAL AS NEEDED
Status: DISCONTINUED | OUTPATIENT
Start: 2025-01-14 | End: 2025-01-14

## 2025-01-14 RX ORDER — HYDROMORPHONE HCL/PF 1 MG/ML
0.5 SYRINGE (ML) INJECTION
Status: DISCONTINUED | OUTPATIENT
Start: 2025-01-14 | End: 2025-01-14 | Stop reason: HOSPADM

## 2025-01-14 RX ORDER — CEFAZOLIN SODIUM 2 G/50ML
2000 SOLUTION INTRAVENOUS ONCE
Status: COMPLETED | OUTPATIENT
Start: 2025-01-14 | End: 2025-01-15

## 2025-01-14 RX ORDER — SODIUM CHLORIDE, SODIUM LACTATE, POTASSIUM CHLORIDE, CALCIUM CHLORIDE 600; 310; 30; 20 MG/100ML; MG/100ML; MG/100ML; MG/100ML
100 INJECTION, SOLUTION INTRAVENOUS CONTINUOUS
Status: DISCONTINUED | OUTPATIENT
Start: 2025-01-14 | End: 2025-01-15

## 2025-01-14 RX ORDER — FENTANYL CITRATE/PF 50 MCG/ML
25 SYRINGE (ML) INJECTION
Status: COMPLETED | OUTPATIENT
Start: 2025-01-14 | End: 2025-01-14

## 2025-01-14 RX ORDER — CEFAZOLIN SODIUM 1 G/3ML
INJECTION, POWDER, FOR SOLUTION INTRAMUSCULAR; INTRAVENOUS AS NEEDED
Status: DISCONTINUED | OUTPATIENT
Start: 2025-01-14 | End: 2025-01-14

## 2025-01-14 RX ORDER — DEXAMETHASONE SODIUM PHOSPHATE 10 MG/ML
INJECTION, SOLUTION INTRAMUSCULAR; INTRAVENOUS AS NEEDED
Status: DISCONTINUED | OUTPATIENT
Start: 2025-01-14 | End: 2025-01-14

## 2025-01-14 RX ORDER — HYDROMORPHONE HCL IN WATER/PF 6 MG/30 ML
0.2 PATIENT CONTROLLED ANALGESIA SYRINGE INTRAVENOUS
Status: DISCONTINUED | OUTPATIENT
Start: 2025-01-14 | End: 2025-01-15

## 2025-01-14 RX ORDER — ONDANSETRON 2 MG/ML
INJECTION INTRAMUSCULAR; INTRAVENOUS AS NEEDED
Status: DISCONTINUED | OUTPATIENT
Start: 2025-01-14 | End: 2025-01-14

## 2025-01-14 RX ORDER — SODIUM CHLORIDE, SODIUM LACTATE, POTASSIUM CHLORIDE, CALCIUM CHLORIDE 600; 310; 30; 20 MG/100ML; MG/100ML; MG/100ML; MG/100ML
INJECTION, SOLUTION INTRAVENOUS CONTINUOUS PRN
Status: DISCONTINUED | OUTPATIENT
Start: 2025-01-14 | End: 2025-01-14

## 2025-01-14 RX ORDER — IPRATROPIUM BROMIDE AND ALBUTEROL SULFATE 2.5; .5 MG/3ML; MG/3ML
3 SOLUTION RESPIRATORY (INHALATION) ONCE
Status: DISCONTINUED | OUTPATIENT
Start: 2025-01-14 | End: 2025-01-14

## 2025-01-14 RX ORDER — SUCCINYLCHOLINE/SOD CL,ISO/PF 100 MG/5ML
SYRINGE (ML) INTRAVENOUS AS NEEDED
Status: DISCONTINUED | OUTPATIENT
Start: 2025-01-14 | End: 2025-01-14

## 2025-01-14 RX ORDER — ROCURONIUM BROMIDE 10 MG/ML
INJECTION, SOLUTION INTRAVENOUS AS NEEDED
Status: DISCONTINUED | OUTPATIENT
Start: 2025-01-14 | End: 2025-01-14

## 2025-01-14 RX ORDER — HYDROMORPHONE HCL/PF 1 MG/ML
SYRINGE (ML) INJECTION AS NEEDED
Status: DISCONTINUED | OUTPATIENT
Start: 2025-01-14 | End: 2025-01-14

## 2025-01-14 RX ORDER — ONDANSETRON 2 MG/ML
4 INJECTION INTRAMUSCULAR; INTRAVENOUS ONCE AS NEEDED
Status: DISCONTINUED | OUTPATIENT
Start: 2025-01-14 | End: 2025-01-14 | Stop reason: HOSPADM

## 2025-01-14 RX ORDER — SODIUM CHLORIDE 9 MG/ML
100 INJECTION, SOLUTION INTRAVENOUS CONTINUOUS
Status: DISCONTINUED | OUTPATIENT
Start: 2025-01-14 | End: 2025-01-15

## 2025-01-14 RX ORDER — BUPIVACAINE HYDROCHLORIDE 2.5 MG/ML
INJECTION, SOLUTION EPIDURAL; INFILTRATION; INTRACAUDAL
Status: DISCONTINUED | OUTPATIENT
Start: 2025-01-14 | End: 2025-01-14

## 2025-01-14 RX ORDER — MAGNESIUM HYDROXIDE 1200 MG/15ML
LIQUID ORAL AS NEEDED
Status: DISCONTINUED | OUTPATIENT
Start: 2025-01-14 | End: 2025-01-14 | Stop reason: HOSPADM

## 2025-01-14 RX ORDER — FENTANYL CITRATE 50 UG/ML
INJECTION, SOLUTION INTRAMUSCULAR; INTRAVENOUS AS NEEDED
Status: DISCONTINUED | OUTPATIENT
Start: 2025-01-14 | End: 2025-01-14

## 2025-01-14 RX ORDER — PROPOFOL 10 MG/ML
INJECTION, EMULSION INTRAVENOUS AS NEEDED
Status: DISCONTINUED | OUTPATIENT
Start: 2025-01-14 | End: 2025-01-14

## 2025-01-14 RX ADMIN — HYDROMORPHONE HYDROCHLORIDE 0.5 MG: 1 INJECTION, SOLUTION INTRAMUSCULAR; INTRAVENOUS; SUBCUTANEOUS at 12:54

## 2025-01-14 RX ADMIN — ARIPIPRAZOLE 10 MG: 10 TABLET ORAL at 08:19

## 2025-01-14 RX ADMIN — IOHEXOL 75 ML: 350 INJECTION, SOLUTION INTRAVENOUS at 16:35

## 2025-01-14 RX ADMIN — PROPOFOL 150 MG: 10 INJECTION, EMULSION INTRAVENOUS at 20:24

## 2025-01-14 RX ADMIN — ONDANSETRON 4 MG: 2 INJECTION INTRAMUSCULAR; INTRAVENOUS at 21:26

## 2025-01-14 RX ADMIN — HYDROMORPHONE HYDROCHLORIDE 0.5 MG: 1 INJECTION, SOLUTION INTRAMUSCULAR; INTRAVENOUS; SUBCUTANEOUS at 20:55

## 2025-01-14 RX ADMIN — FENTANYL CITRATE 25 MCG: 50 INJECTION INTRAMUSCULAR; INTRAVENOUS at 22:36

## 2025-01-14 RX ADMIN — BENZTROPINE MESYLATE 1 MG: 1 TABLET ORAL at 08:17

## 2025-01-14 RX ADMIN — PANTOPRAZOLE SODIUM 40 MG: 40 TABLET, DELAYED RELEASE ORAL at 05:14

## 2025-01-14 RX ADMIN — SODIUM CHLORIDE 100 ML/HR: 0.9 INJECTION, SOLUTION INTRAVENOUS at 09:22

## 2025-01-14 RX ADMIN — FENTANYL CITRATE 50 MCG: 50 INJECTION INTRAMUSCULAR; INTRAVENOUS at 20:43

## 2025-01-14 RX ADMIN — FENTANYL CITRATE 25 MCG: 50 INJECTION INTRAMUSCULAR; INTRAVENOUS at 22:44

## 2025-01-14 RX ADMIN — HYDROMORPHONE HYDROCHLORIDE 0.5 MG: 1 INJECTION, SOLUTION INTRAMUSCULAR; INTRAVENOUS; SUBCUTANEOUS at 08:36

## 2025-01-14 RX ADMIN — BUSPIRONE HYDROCHLORIDE 15 MG: 10 TABLET ORAL at 08:18

## 2025-01-14 RX ADMIN — FENTANYL CITRATE 25 MCG: 50 INJECTION INTRAMUSCULAR; INTRAVENOUS at 22:48

## 2025-01-14 RX ADMIN — BUPIVACAINE HYDROCHLORIDE 26 ML: 2.5 INJECTION, SOLUTION EPIDURAL; INFILTRATION; INTRACAUDAL; PERINEURAL at 22:17

## 2025-01-14 RX ADMIN — CEFAZOLIN SODIUM 2000 MG: 2 SOLUTION INTRAVENOUS at 23:46

## 2025-01-14 RX ADMIN — Medication 100 MG: at 20:24

## 2025-01-14 RX ADMIN — FENTANYL CITRATE 25 MCG: 50 INJECTION INTRAMUSCULAR; INTRAVENOUS at 22:39

## 2025-01-14 RX ADMIN — CEFTRIAXONE SODIUM 2000 MG: 10 INJECTION, POWDER, FOR SOLUTION INTRAVENOUS at 08:18

## 2025-01-14 RX ADMIN — ACETAMINOPHEN 975 MG: 325 TABLET, FILM COATED ORAL at 05:14

## 2025-01-14 RX ADMIN — METHOCARBAMOL 750 MG: 750 TABLET ORAL at 00:07

## 2025-01-14 RX ADMIN — ONDANSETRON 4 MG: 2 INJECTION INTRAMUSCULAR; INTRAVENOUS at 05:28

## 2025-01-14 RX ADMIN — PROPOFOL 50 MG: 10 INJECTION, EMULSION INTRAVENOUS at 20:27

## 2025-01-14 RX ADMIN — DEXAMETHASONE SODIUM PHOSPHATE 10 MG: 10 INJECTION, SOLUTION INTRAMUSCULAR; INTRAVENOUS at 20:30

## 2025-01-14 RX ADMIN — FENTANYL CITRATE 50 MCG: 50 INJECTION INTRAMUSCULAR; INTRAVENOUS at 20:18

## 2025-01-14 RX ADMIN — BUPIVACAINE HYDROCHLORIDE 25 ML: 2.5 INJECTION, SOLUTION EPIDURAL; INFILTRATION; INTRACAUDAL at 22:12

## 2025-01-14 RX ADMIN — SUGAMMADEX 200 MG: 100 INJECTION, SOLUTION INTRAVENOUS at 22:17

## 2025-01-14 RX ADMIN — ASPIRIN 81 MG: 81 TABLET, COATED ORAL at 08:18

## 2025-01-14 RX ADMIN — AMLODIPINE BESYLATE 5 MG: 5 TABLET ORAL at 08:17

## 2025-01-14 RX ADMIN — ROCURONIUM BROMIDE 10 MG: 10 INJECTION, SOLUTION INTRAVENOUS at 20:55

## 2025-01-14 RX ADMIN — HYDROXYZINE HYDROCHLORIDE 25 MG: 25 TABLET, FILM COATED ORAL at 08:17

## 2025-01-14 RX ADMIN — HYDROMORPHONE HYDROCHLORIDE 0.5 MG: 1 INJECTION, SOLUTION INTRAMUSCULAR; INTRAVENOUS; SUBCUTANEOUS at 21:35

## 2025-01-14 RX ADMIN — ONDANSETRON 4 MG: 2 INJECTION INTRAMUSCULAR; INTRAVENOUS at 12:18

## 2025-01-14 RX ADMIN — LIDOCAINE HYDROCHLORIDE 50 MG: 10 INJECTION, SOLUTION EPIDURAL; INFILTRATION; INTRACAUDAL; PERINEURAL at 20:24

## 2025-01-14 RX ADMIN — SODIUM CHLORIDE, SODIUM LACTATE, POTASSIUM CHLORIDE, AND CALCIUM CHLORIDE: .6; .31; .03; .02 INJECTION, SOLUTION INTRAVENOUS at 19:50

## 2025-01-14 RX ADMIN — ALUMINUM HYDROXIDE, MAGNESIUM HYDROXIDE, AND SIMETHICONE 30 ML: 200; 200; 20 SUSPENSION ORAL at 05:10

## 2025-01-14 RX ADMIN — PREGABALIN 150 MG: 75 CAPSULE ORAL at 08:18

## 2025-01-14 RX ADMIN — METHOCARBAMOL 750 MG: 750 TABLET ORAL at 05:14

## 2025-01-14 RX ADMIN — OXYCODONE HYDROCHLORIDE 10 MG: 10 TABLET ORAL at 05:28

## 2025-01-14 RX ADMIN — BUPIVACAINE 10 ML: 13.3 INJECTION, SUSPENSION, LIPOSOMAL INFILTRATION at 22:17

## 2025-01-14 RX ADMIN — CEFAZOLIN 2000 MG: 1 INJECTION, POWDER, FOR SOLUTION INTRAMUSCULAR; INTRAVENOUS at 20:35

## 2025-01-14 RX ADMIN — ROCURONIUM BROMIDE 30 MG: 10 INJECTION, SOLUTION INTRAVENOUS at 20:30

## 2025-01-14 RX ADMIN — ENOXAPARIN SODIUM 40 MG: 40 INJECTION SUBCUTANEOUS at 08:18

## 2025-01-14 NOTE — PROGRESS NOTES
Progress Note - Infectious Disease   Name: Samantha Rodriguez 59 y.o. female I MRN: [id number]  Unit/Bed#: [facility] 615-01 I Date of Admission: 1/9/2025   Date of Service: 1/14/2025 I Hospital Day: 5    Assessment & Plan  Wound infection complicating hardware (HCC)  Presenting with delayed wound dehiscence in setting of recent fusion surgery as below.  Status post I&D 1/10.25.  Intraoperative findings notable for wound dehiscence to level of fascia which appeared intact.  Fascia was opened and deep space was explored.  Superficial and deep cultures grew Proteus.  Concern for HW involvement.    Continue ceftriaxone 2g IV Q24 through 2/21/25 to complete 6 weeks postop given concern for HW infection  Monitor closely while on antibiotics for toxicities including diarrhea, rash, cytopenias, or kidney injury  Check weekly CBC-diff, CMP while on IV antibiotics to monitor for toxicities.  D/C planning for STR  Outpatient follow-up with ID 2 weeks after D/C  D/C PICC after last dose of IV antibiotics  Given retained HW, will likely need suppressive antibiotics after IV course complete  Lumbar radiculopathy  Remote fusion L2-S1.  Underwent removal hardware L2-S1, posterior fusion L5-S1, revision instrumentation L2 to pelvis; application of bilateral S2 alar screws and bone grafting of the disc at L5-S1 12/19/24.  Complicated by infection as above.  RSV bronchitis  Patient tested positive on 1/3/25.  Mild URI symptoms, no O2 requirement.  Improved.  Ileus (HCC)  Recurrent.  Initially managed with NGT.  Now with recurrent clinical and radiographic distention.    Follow up CT A/P per primary  Low threshold for surgical consultation        Antibiotics:  Ceftriaxone  POD #4    Subjective   Patient has no fever, chills, sweats; no nausea, vomiting, diarrhea; no cough, shortness of breath; no pain. No new symptoms.    Objective :  Temp:  [98.6 °F (37 °C)-98.7 °F (37.1 °C)] 98.6 °F (37 °C)  HR:  [59-84] 84  BP: (111-128)/(64-87)  128/87  Resp:  [17-18] 18  SpO2:  [96 %-100 %] 97 %  O2 Device: None (Room air)    General:  No acute distress  Psychiatric:  Awake and alert  Pulmonary:  Normal respiratory excursion without accessory muscle use  Abdomen:  Distended, tympanitic  Extremities:  No edema  Skin:  No rashes      Lab Results: I have reviewed the following results:  Results from last 7 days   Lab Units 01/14/25  0525 01/13/25  0509 01/12/25  0843   WBC Thousand/uL 6.58 5.51 7.12   HEMOGLOBIN g/dL 9.0* 8.1* 9.1*   PLATELETS Thousands/uL 465* 452* 470*     Results from last 7 days   Lab Units 01/14/25  0525 01/13/25  0509 01/12/25  0843   SODIUM mmol/L 141 139 139   POTASSIUM mmol/L 3.7 3.8 3.7   CHLORIDE mmol/L 104 106 107   CO2 mmol/L 29 28 26   BUN mg/dL 9 12 15   CREATININE mg/dL 0.51* 0.48* 0.65   EGFR ml/min/1.73sq m 104 106 96   CALCIUM mg/dL 8.1* 7.4* 7.9*     Results from last 7 days   Lab Units 01/10/25  1227 01/10/25  1220   GRAM STAIN RESULT  No Polys or Bacteria seen 3+ Polys  No organisms seen             Results from last 7 days   Lab Units 01/08/25  0528   FERRITIN ng/mL 500*         Imaging Results Review: I personally reviewed the following image studies in PACS and associated radiology reports: AXR. My interpretation of the radiology images/reports is: distended loops of SB.

## 2025-01-14 NOTE — RESTORATIVE TECHNICIAN NOTE
Restorative Technician Note      Patient Name: Samantha Rodriguez     [facility] Tech Visit Date: 01/14/25  Note Type: Mobility  Patient Position Upon Consult: Supine  Activity Performed: Ambulated  Assistive Device: Roller walker  Patient Position at End of Consult: Bedside chair; All needs within reach; Bed/Chair alarm activated    MONTSE Donohue

## 2025-01-14 NOTE — UTILIZATION REVIEW
NOTIFICATION OF ADMISSION DISCHARGE   This is a Notification of Discharge from [facility]. Please be advised that this patient has been discharge from our facility. Below you will find the admission and discharge date and time including the patient’s disposition.   UTILIZATION REVIEW CONTACT:  Janice Teixeira  Utilization   Network Utilization Review Department  Phone: [phone] x carefully listen to the prompts. All voicemails are confidential.  Email: [email or web address]     ADMISSION INFORMATION  PRESENTATION DATE: 1/2/2025  8:46 PM  OBERVATION ADMISSION DATE: 01/02/2025 2247  INPATIENT ADMISSION DATE: 1/3/25  2:45 PM   DISCHARGE DATE: 1/9/2025 11:15 PM   DISPOSITION:[facility] [place] Utilization Review Department  ATTENTION: Please call with any questions or concerns to [phone] and carefully listen to the prompts so that you are directed to the right person. All voicemails are confidential.   For Discharge needs, contact Care Management DC Support Team at [phone] opt. [phone]  Send all requests for admission clinical reviews, approved or denied determinations and any other requests to dedicated fax number below belonging to the campus where the patient is receiving treatment. List of dedicated fax numbers for the Facilities:  FACILITY NAME UR FAX NUMBER   ADMISSION DENIALS (Administrative/Medical Necessity) [phone]   DISCHARGE SUPPORT TEAM ([facility]) [phone]   PARENT CHILD HEALTH (Maternity/NICU/Pediatrics) [phone]   [facility] [phone]   [facility] [phone]   formerly [facility] [phone]   [facility] [phone]   [facility] [phone]   [facility] [phone]   [facility] [phone]   [facility]  [place] [phone]   [facility] [phone]   [facility] [phone]   [facility] [phone]   [facility] [phone]

## 2025-01-14 NOTE — ANESTHESIA PREPROCEDURE EVALUATION
Procedure:  LAPAROTOMY EXPLORATORY, possible bowel resection, possible ostomy (Abdomen)    NPO since 0800 a sandwich    RSV bronchitis 01/04/25    Hgb 9.0, Lactate 1.2,     Relevant Problems   CARDIO   (+) Essential hypertension   (+) Migraine   (+) Mixed hyperlipidemia      GI/HEPATIC   (+) Dysphagia   (+) GERD without esophagitis   (+) Ileus (HCC)   (+) Small bowel obstruction (HCC)      HEMATOLOGY   (+) Acute blood loss anemia   (+) Anemia      MUSCULOSKELETAL   (+) Chronic low back pain, unspecified back pain laterality, unspecified whether sciatica present   (+) Fibromyalgia   (+) Lumbar spondylosis      NEURO/PSYCH   (+) CVA (cerebral vascular accident) (HCC)   (+) Chronic low back pain, unspecified back pain laterality, unspecified whether sciatica present   (+) Chronic pain disorder   (+) Fibromyalgia   (+) Generalized anxiety disorder with panic attacks   (+) Migraine   (+) PTSD (post-traumatic stress disorder)   (+) Panic disorder without agoraphobia   (+) Severe episode of recurrent major depressive disorder, without psychotic features (HCC)      PULMONARY   (+) MIMI (obstructive sleep apnea)        Physical Exam    Airway    Mallampati score: II  TM Distance: >3 FB  Neck ROM: full     Dental   No notable dental hx     Cardiovascular  Rhythm: regular, Rate: normal, Cardiovascular exam normal    Pulmonary   Wheezes    Other Findings  post-pubertal.      Anesthesia Plan  ASA Score- 3     Anesthesia Type- general with ASA Monitors.         Additional Monitors: arterial line.    Airway Plan: ETT.    Comment: Risks/benefits and alternatives discussed including medication reaction, sore throat, aspiration, dental/oropharyngeal/ocular injuries, and/or grave/life threatening anesthetic and surgical emergencies..       Plan Factors-Exercise tolerance (METS): >4 METS.    Chart reviewed.    Patient summary reviewed.      Patient instructed to abstain from smoking on day of procedure. Patient did not smoke on day of  surgery.            Induction- intravenous.    Postoperative Plan- Plan for postoperative opioid use. Planned trial extubation    Perioperative Resuscitation Plan - Level 1 - Full Code.       Informed Consent- Anesthetic plan and risks discussed with patient.  I personally reviewed this patient with the CRNA. Discussed and agreed on the Anesthesia Plan with the CRNA..

## 2025-01-14 NOTE — ASSESSMENT & PLAN NOTE
Presenting with delayed wound dehiscence in setting of recent fusion surgery as below.  Status post I&D 1/10.25.  Intraoperative findings notable for wound dehiscence to level of fascia which appeared intact.  Fascia was opened and deep space was explored.  Superficial and deep cultures grew Proteus.  Concern for HW involvement.    Continue ceftriaxone 2g IV Q24 through 2/21/25 to complete 6 weeks postop given concern for HW infection  Monitor closely while on antibiotics for toxicities including diarrhea, rash, cytopenias, or kidney injury  Check weekly CBC-diff, CMP while on IV antibiotics to monitor for toxicities.  D/C planning for STR  Outpatient follow-up with ID 2 weeks after D/C  D/C PICC after last dose of IV antibiotics  Given retained HW, will likely need suppressive antibiotics after IV course complete

## 2025-01-14 NOTE — ASSESSMENT & PLAN NOTE
Recurrent.  Initially managed with NGT.  Now with recurrent clinical and radiographic distention.    Follow up CT A/P per primary  Low threshold for surgical consultation

## 2025-01-14 NOTE — PROGRESS NOTES
Progress Note - Orthopedics   Name: Samantha Rodriguez 59 y.o. female I MRN: [id number]  [facility] I Date of Admission: 1/9/2025   Date of Service: 1/14/2025 I Hospital Day: 5    Assessment & Plan  Lumbar radiculopathy  See above  Class 2 obesity without serious comorbidity with body mass index (BMI) of 39.0 to 39.9 in adult    Wound infection complicating hardware (HCC)  Samantha is a 60 y/o F presenting 4 weeks s/p  weeks s/p removal hardware L2-S1, posterior fusion L5-S1, revision instrumentation L2-pelvis, bilateral S2 alar screws with Dr. Bills and Dr. Stevens (DOS 12/19/24) with concern for wound dehiscence.      Now s/p I&D lumbar spine. Doing well. OR cultures growing proteus in both the deep and superficial cultures. ID changed abx to IV ceftriaxone pending suseptibilities. ID recommending 6wks IV abx     WBAT all extremities  Regular diet  Continue abx per id  ID consulted, appreciate recs   Picc line placement and 6wks iv ceftriaxone  Monitor 2x lumbar drains   Multimodal pain control  PT/OT      Subjective   59 y.o.female doing well. No acute events, complains of abd pain this am. Denies fevers, chills, CP, SOB, N/V, numbness or tingling. Patient reports no issues with urination.     Objective :  Temp:  [98 °F (36.7 °C)-98.7 °F (37.1 °C)] 98.7 °F (37.1 °C)  HR:  [59-78] 59  BP: (109-116)/(64-69) 111/64  Resp:  [17-18] 17  SpO2:  [96 %-100 %] 96 %    Physical Exam  Musculoskeletal: bilateral lower  Skin intact . No erythema or ecchymosis.  Dressing c/d/i  TTP sona-incisional   Motor intact to +FHL/EHL, +ankle dorsi/plantar flexion  2+ DP pulse  Sensation intact L3-S1  Motor intact L3-S1  Drains in place with ss op      Lab Results: I have reviewed the following results:  Recent Labs     01/11/25  0842 01/12/25  0843 01/13/25  0509   WBC 7.95 7.12 5.51   HGB 8.2* 9.1* 8.1*   HCT 26.3* 29.8* 26.7*   * 470* 452*   BUN 15 15 12   CREATININE 0.76 0.65 0.48*     Blood Culture:    Lab Results  "  Component Value Date    BLOODCX No Growth After 5 Days. 01/03/2025    BLOODCX No Growth After 5 Days. 01/03/2025     Wound Culture: No results found for: \"WOUNDCULT\"  "

## 2025-01-14 NOTE — ASSESSMENT & PLAN NOTE
Noted to have worsening epigastric sharp pain that started 1/8 overnight with an episode of vomiting  XR abdomen shows severely dilated loops concerning for SBO or ileus  CT A/P shows dilated loops of small bowel suggestive of underlying SBO with transition point in the central abdomen where there is slight swirling of the mesenteric vessel and underlying internal hernia cannot be fully excluded.  Fluid seen in the colon suggestive of diarrheal illness without colon wall thickening to suggest colitis.  Cholelithiasis.  Small ascites.  Previously noted neurostimulator leads have been removed in the interim, does appear to have small retained fragment in the posterior lower back.  Ill-defined fluid in the pocket of the soft tissue gas and surrounding inflammatory stranding in the midline low back suspicious for abscess.  NG tube was placed, was evaluated by general surgery.  Bowel function returned prior to the OR, was cleared for OR.  Patient with worsening abdominal distention and pain since evening of 1/13. Repeat OBS concerning for obstruction. Surgery re consulted and NGT placed and contrast CT ordered.

## 2025-01-14 NOTE — PROGRESS NOTES
Progress Note - Surgery-General   Name: Samantha Rodriguez 59 y.o. female I MRN: [id number]  Unit/Bed#: [facility] 615-01 I Date of Admission: 1/9/2025   Date of Service: 1/14/2025 I Hospital Day: 5    Assessment & Plan  Ileus (HCC)  Evaluation for ileus versus SBO  Red general surgery reengaged.  Originally consulted for ileus versus SBO, that was managed conservatively with clinical improvement.  Patient developed nausea without vomiting and significant distention overnight    Plan:  NG tube  CT with IV contrast  Class 2 obesity without serious comorbidity with body mass index (BMI) of 39.0 to 39.9 in adult    Wound infection complicating hardware (HCC)      Subjective   Feels nausea without vomiting.  Has emesis basin at bedside.  Had sips yesterday before NPO.  Passing minimal flatus.  Went for a walk like this morning    Objective :  Temp:  [98.6 °F (37 °C)-98.7 °F (37.1 °C)] 98.6 °F (37 °C)  HR:  [59-84] 84  BP: (111-128)/(64-87) 128/87  Resp:  [17-18] 18  SpO2:  [96 %-100 %] 97 %  O2 Device: None (Room air)    I/O         01/12 0701  01/13 0700 01/13 0701  01/14 0700 01/14 0701  01/15 0700    P.O. 1080 360     IV Piggyback  50     Total Intake(mL/kg) 1080 (11.6) 410 (4.3)     Urine (mL/kg/hr) 0 (0) 0 (0)     Drains 140 120     Stool       Total Output 140 120     Net +940 +290            Unmeasured Urine Occurrence  4 x 1 x          Lines/Drains/Airways       Active Status       Name Placement date Placement time Site Days    PICC Line 01/13/25 Left Basilic 01/13/25  1118  Basilic  1    Closed/Suction Drain Right Back Bulb 19 Fr. 01/10/25  1234  Back  4    Closed/Suction Drain Left Back Bulb 19 Fr. 01/10/25  1234  Back  4                  Physical Exam  Constitutional:       Appearance: Normal appearance.   HENT:      Head: Normocephalic and atraumatic.      Mouth/Throat:      Mouth: Mucous membranes are moist.   Eyes:      Extraocular Movements: Extraocular movements intact.   Cardiovascular:      Rate and Rhythm:  Normal rate and regular rhythm.   Pulmonary:      Effort: Pulmonary effort is normal.   Abdominal:      General: Abdomen is flat. There is distension (Markedly).      Palpations: Abdomen is soft.      Tenderness: There is abdominal tenderness (Minimal generalized). There is no guarding or rebound.   Musculoskeletal:         General: Normal range of motion.      Cervical back: Normal range of motion and neck supple.   Skin:     General: Skin is warm and dry.   Neurological:      General: No focal deficit present.      Mental Status: She is alert and oriented to person, place, and time.           Lab Results: I have reviewed the following results:  Recent Labs     01/14/25  0525   WBC 6.58   HGB 9.0*   HCT 31.0*   *   SODIUM 141   K 3.7      CO2 29   BUN 9   CREATININE 0.51*   GLUC 100     VTE Pharmacologic Prophylaxis: VTE covered by:  enoxaparin, Subcutaneous, 40 mg at 01/14/25 0818

## 2025-01-14 NOTE — ASSESSMENT & PLAN NOTE
Evaluation for ileus versus SBO  Red general surgery reengaged.  Originally consulted for ileus versus SBO, that was managed conservatively with clinical improvement.  Patient developed nausea without vomiting and significant distention overnight    Plan:  NG tube  CT with IV contrast

## 2025-01-14 NOTE — PROGRESS NOTES
Progress Note - Hospitalist   Name: Samantha Rodriguez 59 y.o. female I MRN: [id number]  [facility] I Date of Admission: 1/9/2025   Date of Service: 1/14/2025 I Hospital Day: 5    Assessment & Plan  Wound infection complicating hardware (HCC)  Initially presented to [facility] 1/2 with ambulatory dysfunction.  Recently discharged from rehab following recent back surgery in December 2024.  On admission to [facility], patient met criteria and was diagnosed with UTI and RSV.  Patient also noted to have small area of wound dehiscence at the distal aspect of the wound from recent surgical hardware removal.  CT was ordered which showed ill-defined fluid with pockets of soft tissue gas and surrounding inflammatory stranding in the midline lower back suspicious for an abscess therefore patient was transferred to [facility] for orthopedic evaluation.  Recent blood cultures 1/3 negative  S/P I&D lumbar spine 1/10 with ortho. Cloudy fluid noted superficially and fluid tracking deeper to the hardware.   Drain x 2 in place  Cultures growing proteus  IV Ancef switched to IV Ceftriaxone 1/12 per ID  Will require prolonged course of IV abx   PICC line placed 1/13   Pain control per APS recommendations: ATC APAP, lido patch, scheduled Robaxin, as needed oxycodone with IV Dilaudid for breakthrough. APS signed off. D/C IV dilaudid when able to tolerate po   PT/OT recommending rehab.   Ileus (HCC)  Noted to have worsening epigastric sharp pain that started 1/8 overnight with an episode of vomiting  XR abdomen shows severely dilated loops concerning for SBO or ileus  CT A/P shows dilated loops of small bowel suggestive of underlying SBO with transition point in the central abdomen where there is slight swirling of the mesenteric vessel and underlying internal hernia cannot be fully excluded.  Fluid seen in the colon suggestive of diarrheal illness without colon wall thickening to suggest colitis.  Cholelithiasis.  Small  ascites.  Previously noted neurostimulator leads have been removed in the interim, does appear to have small retained fragment in the posterior lower back.  Ill-defined fluid in the pocket of the soft tissue gas and surrounding inflammatory stranding in the midline low back suspicious for abscess.  NG tube was placed, was evaluated by general surgery.  Bowel function returned prior to the OR, was cleared for OR.  Patient with worsening abdominal distention and pain since evening of 1/13. Repeat OBS concerning for obstruction. Surgery re consulted and NGT placed and contrast CT ordered.  Lumbar radiculopathy  S/p removal hardware L2-S1, posterior fusion L5-S1, revision instrumentation L2-pelvis, bilateral S2 alar screws with orthopedic surgery on 12/19. Now dehiscence and concern for abscess as above.  Status post I&D as above  Pain control as above  Chronic pain disorder    Multiple short courses of opioids noted in history.  APS following for assistance with pain management, their recommendations are appreciated. Now signed off  Bipolar disorder (HCC)  History of severe MDD and AMAURY with panic attacks  Continue PTA medications with Abilify, Zoloft, BuSpar, Minipress, trazodone and Atarax  GERD without esophagitis  Continue PPI  Essential hypertension  BP stable  Continue amlodipine and prazosin  Tardive dyskinesia  Patient is on Ingrezza which is nonformulary, Substitute with Cogentin 1 mg twice daily for now  Will have family bring it to be given if able   Outpatient follow-up with psychiatry and neurology  Ambulatory dysfunction  Patient reports after getting back from rehab she has been struggling to walk secondary to her back.  Typically uses walker  Recommended rehab as above   History of CVA (cerebrovascular accident)  Continue aspirin and statin  Anemia  Prior hemoglobin range of 12-13 in 2023 however most recently in the 9s since her surgery in December  Hemoglobin currently stable in the 9s  Monitor  RSV  bronchitis  1/3 RSV positive  Chest x-ray without acute findings  Completed 5 days of steroid therapy  Symptoms resolved   History of UTI  Treated with 3 days of IV Ancef for E. coli prior to transfer  Class 2 obesity without serious comorbidity with body mass index (BMI) of 39.0 to 39.9 in adult  Weight loss/lifestyle modifications as outpatient     VTE Pharmacologic Prophylaxis:   High Risk (Score >/= 5) - Pharmacological DVT Prophylaxis Ordered: enoxaparin (Lovenox). Sequential Compression Devices Ordered.    Mobility:   Basic Mobility Inpatient Raw Score: 17  JH-HLM Goal: 5: Stand one or more mins  JH-HLM Achieved: 5: Stand (1 or more minutes)  JH-HLM Goal achieved. Continue to encourage appropriate mobility.    Patient Centered Rounds: I performed bedside rounds with nursing staff today.   Discussions with Specialists or Other Care Team Provider: General surgery, case management    Education and Discussions with Family / Patient: Updated  (sister) via phone.    Current Length of Stay: 5 day(s)  Current Patient Status: Inpatient   Certification Statement: The patient will continue to require additional inpatient hospital stay due to ongoing ileus vs SBO  Discharge Plan: Anticipate discharge in >72 hrs to rehab facility.    Code Status: Level 1 - Full Code    Subjective   Patient c/o worsening abdominal distention and abdominal pain last 24 hours. No BM since 1/11 per nursing. Patient c/o reflux, but no vomiting.     Objective :  Temp:  [98.6 °F (37 °C)-98.7 °F (37.1 °C)] 98.6 °F (37 °C)  HR:  [59-84] 84  BP: (111-128)/(64-87) 128/87  Resp:  [17-18] 18  SpO2:  [96 %-100 %] 97 %  O2 Device: None (Room air)    Body mass index is 41.03 kg/m².     Input and Output Summary (last 24 hours):     Intake/Output Summary (Last 24 hours) at 1/14/2025 1211  Last data filed at 1/14/2025 0515  Gross per 24 hour   Intake 120 ml   Output 80 ml   Net 40 ml       Physical Exam  Vitals and nursing note reviewed.    Constitutional:       General: She is not in acute distress.     Appearance: She is well-developed.   HENT:      Head: Normocephalic and atraumatic.   Cardiovascular:      Rate and Rhythm: Normal rate and regular rhythm.      Heart sounds: No murmur heard.     No friction rub.   Pulmonary:      Effort: Pulmonary effort is normal. No respiratory distress.      Breath sounds: Normal breath sounds. No wheezing.   Abdominal:      General: There is distension.      Tenderness: There is abdominal tenderness (generalized tenderness to palpation). There is no guarding or rebound.   Skin:     General: Skin is warm and dry.      Findings: No rash.   Neurological:      Mental Status: She is alert and oriented to person, place, and time.      Cranial Nerves: No cranial nerve deficit.      Comments: Tardive dyskinesia           Lines/Drains:  Lines/Drains/Airways       Active Status       Name Placement date Placement time Site Days    PICC Line 01/13/25 Left Basilic 01/13/25  1118  Basilic  1    Closed/Suction Drain Right Back Bulb 19 Fr. 01/10/25  1234  Back  3    Closed/Suction Drain Left Back Bulb 19 Fr. 01/10/25  1234  Back  3                    Central Line:  Goal for removal: N/A - Discharging with PICC for IV ABX/medications               Lab Results: I have reviewed the following results:   Results from last 7 days   Lab Units 01/14/25  0525 01/13/25  0509   WBC Thousand/uL 6.58 5.51   HEMOGLOBIN g/dL 9.0* 8.1*   HEMATOCRIT % 31.0* 26.7*   PLATELETS Thousands/uL 465* 452*   SEGS PCT %  --  52   LYMPHO PCT %  --  29   MONO PCT %  --  7   EOS PCT %  --  10*     Results from last 7 days   Lab Units 01/14/25  0525   SODIUM mmol/L 141   POTASSIUM mmol/L 3.7   CHLORIDE mmol/L 104   CO2 mmol/L 29   BUN mg/dL 9   CREATININE mg/dL 0.51*   ANION GAP mmol/L 8   CALCIUM mg/dL 8.1*   GLUCOSE RANDOM mg/dL 100         Results from last 7 days   Lab Units 01/10/25  1427   POC GLUCOSE mg/dl 88         Results from last 7 days   Lab  Units 01/10/25  1101   LACTIC ACID mmol/L 0.5       Recent Cultures (last 7 days):   Results from last 7 days   Lab Units 01/10/25  1227 01/10/25  1220   GRAM STAIN RESULT  No Polys or Bacteria seen 3+ Polys  No organisms seen       Imaging Results Review: I reviewed radiology reports from this admission including: xray(s).      Last 24 Hours Medication List:     Current Facility-Administered Medications:     acetaminophen (TYLENOL) tablet 975 mg, Q8H KESHIA    albuterol (PROVENTIL HFA,VENTOLIN HFA) inhaler 2 puff, Q4H PRN    aluminum-magnesium hydroxide-simethicone (MAALOX) oral suspension 30 mL, Q4H PRN    amLODIPine (NORVASC) tablet 5 mg, Daily    ARIPiprazole (ABILIFY) tablet 10 mg, Daily    aspirin (ECOTRIN LOW STRENGTH) EC tablet 81 mg, Daily    atorvastatin (LIPITOR) tablet 40 mg, Daily With Dinner    benztropine (COGENTIN) tablet 1 mg, BID    busPIRone (BUSPAR) tablet 15 mg, TID    cefTRIAXone (ROCEPHIN) 2,000 mg in dextrose 5 % 50 mL IVPB, Q24H, Last Rate: 2,000 mg (01/14/25 0818)    dextromethorphan-guaiFENesin (ROBITUSSIN DM) oral syrup 10 mL, Q4H PRN    enoxaparin (LOVENOX) subcutaneous injection 40 mg, Daily    HYDROmorphone (DILAUDID) injection 0.5 mg, Q4H PRN    hydrOXYzine HCL (ATARAX) tablet 25 mg, TID    lidocaine (LIDODERM) 5 % patch 1 patch, Daily    [Held by provider] lubiprostone (AMITIZA) capsule 8 mcg, BID    methocarbamol (ROBAXIN) tablet 750 mg, Q6H KESHIA    naloxone (NARCAN) 0.04 mg/mL syringe 0.04 mg, Q1MIN PRN    ondansetron (ZOFRAN) injection 4 mg, Q6H PRN    oxyCODONE (ROXICODONE) immediate release tablet 10 mg, Q4H PRN    oxyCODONE (ROXICODONE) IR tablet 5 mg, Q4H PRN    pantoprazole (PROTONIX) EC tablet 40 mg, BID AC    phenol (CHLORASEPTIC) 1.4 % mucosal liquid 1 spray, Q2H PRN    pneumococcal 20-cheryl conj vacc (PREVNAR 20) IM Injection 0.5 mL, Once PRN    prazosin (MINIPRESS) capsule 2 mg, HS    pregabalin (LYRICA) capsule 150 mg, TID    sertraline (ZOLOFT) tablet 200 mg, HS    sodium  chloride 0.9 % infusion, Continuous, Last Rate: 100 mL/hr (01/14/25 0922)    traZODone (DESYREL) tablet 300 mg, HS PRN    Administrative Statements   Today, Patient Was Seen By: Lou Reed PA-C      **Please Note: This note may have been constructed using a voice recognition system.**

## 2025-01-14 NOTE — DISCHARGE INSTR - AVS FIRST PAGE
Discharge Instructions - Orthopedics  Samantha Rodriguez 59 y.o. female MRN: [id number]  Unit/Bed#: [facility] 615-01    Weight Bearing Status:                                           You may bear weight as tolerated on both lower extremities.    DVT prophylaxis:  Please continue with your aspirin.    Pain:  Continue analgesics as directed    Dressing Instructions:   Please keep clean, dry and intact until follow up     Appt Instructions:   If you do not have your appointment, please call the clinic at [phone]  Otherwise follow up as scheduled.    Contact the office sooner if you experience any increased numbness/tingling in the extremities.      Please take all antibiotics.

## 2025-01-14 NOTE — ASSESSMENT & PLAN NOTE
Initially presented to [facility] 1/2 with ambulatory dysfunction.  Recently discharged from rehab following recent back surgery in December 2024.  On admission to [facility], patient met criteria and was diagnosed with UTI and RSV.  Patient also noted to have small area of wound dehiscence at the distal aspect of the wound from recent surgical hardware removal.  CT was ordered which showed ill-defined fluid with pockets of soft tissue gas and surrounding inflammatory stranding in the midline lower back suspicious for an abscess therefore patient was transferred to [facility] for orthopedic evaluation.  Recent blood cultures 1/3 negative  S/P I&D lumbar spine 1/10 with ortho. Cloudy fluid noted superficially and fluid tracking deeper to the hardware.   Drain x 2 in place  Cultures growing proteus  IV Ancef switched to IV Ceftriaxone 1/12 per ID  Will require prolonged course of IV abx   PICC line placed 1/13   Pain control per APS recommendations: ATC APAP, lido patch, scheduled Robaxin, as needed oxycodone with IV Dilaudid for breakthrough. APS signed off. D/C IV dilaudid when able to tolerate po   PT/OT recommending rehab.

## 2025-01-14 NOTE — ASSESSMENT & PLAN NOTE
Samantha is a 60 y/o F presenting 4 weeks s/p  weeks s/p removal hardware L2-S1, posterior fusion L5-S1, revision instrumentation L2-pelvis, bilateral S2 alar screws with Dr. Bills and Dr. Stevens (DOS 12/19/24) with concern for wound dehiscence.      Now s/p I&D lumbar spine. Doing well. OR cultures growing proteus in both the deep and superficial cultures. ID changed abx to IV ceftriaxone pending suseptibilities. ID recommending 6wks IV abx     WBAT all extremities  Regular diet  Continue abx per id  ID consulted, appreciate recs   Picc line placement and 6wks iv ceftriaxone  Monitor 2x lumbar drains   Multimodal pain control  PT/OT

## 2025-01-15 PROBLEM — K56.50 SMALL BOWEL OBSTRUCTION DUE TO ADHESIONS (HCC): Status: ACTIVE | Noted: 2023-03-24

## 2025-01-15 PROBLEM — K56.50 SMALL BOWEL OBSTRUCTION DUE TO ADHESIONS (HCC): Status: ACTIVE | Noted: 2025-01-09

## 2025-01-15 LAB
ANION GAP SERPL CALCULATED.3IONS-SCNC: 8 MMOL/L (ref 4–13)
BUN SERPL-MCNC: 9 MG/DL (ref 5–25)
CALCIUM SERPL-MCNC: 7.7 MG/DL (ref 8.4–10.2)
CHLORIDE SERPL-SCNC: 104 MMOL/L (ref 96–108)
CO2 SERPL-SCNC: 28 MMOL/L (ref 21–32)
CREAT SERPL-MCNC: 0.52 MG/DL (ref 0.6–1.3)
ERYTHROCYTE [DISTWIDTH] IN BLOOD BY AUTOMATED COUNT: 15.2 % (ref 11.6–15.1)
GFR SERPL CREATININE-BSD FRML MDRD: 103 ML/MIN/1.73SQ M
GLUCOSE SERPL-MCNC: 125 MG/DL (ref 65–140)
HCT VFR BLD AUTO: 29.6 % (ref 34.8–46.1)
HGB BLD-MCNC: 9 G/DL (ref 11.5–15.4)
MAGNESIUM SERPL-MCNC: 1.5 MG/DL (ref 1.9–2.7)
MCH RBC QN AUTO: 27.1 PG (ref 26.8–34.3)
MCHC RBC AUTO-ENTMCNC: 30.4 G/DL (ref 31.4–37.4)
MCV RBC AUTO: 89 FL (ref 82–98)
PHOSPHATE SERPL-MCNC: 3.1 MG/DL (ref 2.3–4.1)
PLATELET # BLD AUTO: 503 THOUSANDS/UL (ref 149–390)
PMV BLD AUTO: 9.5 FL (ref 8.9–12.7)
POTASSIUM SERPL-SCNC: 4 MMOL/L (ref 3.5–5.3)
RBC # BLD AUTO: 3.32 MILLION/UL (ref 3.81–5.12)
SODIUM SERPL-SCNC: 140 MMOL/L (ref 135–147)
WBC # BLD AUTO: 16.76 THOUSAND/UL (ref 4.31–10.16)

## 2025-01-15 PROCEDURE — 97164 PT RE-EVAL EST PLAN CARE: CPT

## 2025-01-15 PROCEDURE — 85027 COMPLETE CBC AUTOMATED: CPT

## 2025-01-15 PROCEDURE — NC001 PR NO CHARGE: Performed by: ORTHOPAEDIC SURGERY

## 2025-01-15 PROCEDURE — 83735 ASSAY OF MAGNESIUM: CPT

## 2025-01-15 PROCEDURE — 99232 SBSQ HOSP IP/OBS MODERATE 35: CPT | Performed by: PHYSICIAN ASSISTANT

## 2025-01-15 PROCEDURE — 99233 SBSQ HOSP IP/OBS HIGH 50: CPT | Performed by: ANESTHESIOLOGY

## 2025-01-15 PROCEDURE — 80048 BASIC METABOLIC PNL TOTAL CA: CPT

## 2025-01-15 PROCEDURE — 84100 ASSAY OF PHOSPHORUS: CPT

## 2025-01-15 PROCEDURE — 94760 N-INVAS EAR/PLS OXIMETRY 1: CPT

## 2025-01-15 PROCEDURE — 99232 SBSQ HOSP IP/OBS MODERATE 35: CPT | Performed by: INTERNAL MEDICINE

## 2025-01-15 PROCEDURE — NC001 PR NO CHARGE: Performed by: ANESTHESIOLOGY

## 2025-01-15 PROCEDURE — 97168 OT RE-EVAL EST PLAN CARE: CPT

## 2025-01-15 PROCEDURE — 99024 POSTOP FOLLOW-UP VISIT: CPT | Performed by: SURGERY

## 2025-01-15 RX ORDER — ACETAMINOPHEN 10 MG/ML
1000 INJECTION, SOLUTION INTRAVENOUS EVERY 8 HOURS SCHEDULED
Status: DISCONTINUED | OUTPATIENT
Start: 2025-01-15 | End: 2025-01-16

## 2025-01-15 RX ORDER — MAGNESIUM SULFATE HEPTAHYDRATE 40 MG/ML
2 INJECTION, SOLUTION INTRAVENOUS ONCE
Status: COMPLETED | OUTPATIENT
Start: 2025-01-15 | End: 2025-01-16

## 2025-01-15 RX ORDER — SODIUM CHLORIDE, SODIUM GLUCONATE, SODIUM ACETATE, POTASSIUM CHLORIDE, MAGNESIUM CHLORIDE, SODIUM PHOSPHATE, DIBASIC, AND POTASSIUM PHOSPHATE .53; .5; .37; .037; .03; .012; .00082 G/100ML; G/100ML; G/100ML; G/100ML; G/100ML; G/100ML; G/100ML
84 INJECTION, SOLUTION INTRAVENOUS CONTINUOUS
Status: DISCONTINUED | OUTPATIENT
Start: 2025-01-15 | End: 2025-01-20

## 2025-01-15 RX ORDER — ACETAMINOPHEN 10 MG/ML
1000 INJECTION, SOLUTION INTRAVENOUS EVERY 6 HOURS SCHEDULED
Status: DISCONTINUED | OUTPATIENT
Start: 2025-01-15 | End: 2025-01-15

## 2025-01-15 RX ORDER — HYDROMORPHONE HCL/PF 1 MG/ML
1 SYRINGE (ML) INJECTION ONCE
Status: COMPLETED | OUTPATIENT
Start: 2025-01-15 | End: 2025-01-15

## 2025-01-15 RX ORDER — METHOCARBAMOL 100 MG/ML
1000 INJECTION, SOLUTION INTRAMUSCULAR; INTRAVENOUS EVERY 8 HOURS SCHEDULED
Status: DISCONTINUED | OUTPATIENT
Start: 2025-01-15 | End: 2025-01-16

## 2025-01-15 RX ORDER — PANTOPRAZOLE SODIUM 40 MG/10ML
40 INJECTION, POWDER, LYOPHILIZED, FOR SOLUTION INTRAVENOUS EVERY 12 HOURS SCHEDULED
Status: DISCONTINUED | OUTPATIENT
Start: 2025-01-15 | End: 2025-01-17

## 2025-01-15 RX ORDER — METOPROLOL TARTRATE 1 MG/ML
5 INJECTION, SOLUTION INTRAVENOUS EVERY 6 HOURS PRN
Status: DISCONTINUED | OUTPATIENT
Start: 2025-01-15 | End: 2025-01-22 | Stop reason: HOSPADM

## 2025-01-15 RX ADMIN — LIDOCAINE 1 PATCH: 700 PATCH TOPICAL at 20:45

## 2025-01-15 RX ADMIN — PREGABALIN 150 MG: 75 CAPSULE ORAL at 17:00

## 2025-01-15 RX ADMIN — SODIUM CHLORIDE, SODIUM GLUCONATE, SODIUM ACETATE, POTASSIUM CHLORIDE, MAGNESIUM CHLORIDE, SODIUM PHOSPHATE, DIBASIC, AND POTASSIUM PHOSPHATE 100 ML/HR: .53; .5; .37; .037; .03; .012; .00082 INJECTION, SOLUTION INTRAVENOUS at 22:00

## 2025-01-15 RX ADMIN — HYDROMORPHONE HYDROCHLORIDE 0.2 MG: 0.2 INJECTION, SOLUTION INTRAMUSCULAR; INTRAVENOUS; SUBCUTANEOUS at 00:30

## 2025-01-15 RX ADMIN — TRAZODONE HYDROCHLORIDE 300 MG: 100 TABLET ORAL at 21:07

## 2025-01-15 RX ADMIN — HYDROXYZINE HYDROCHLORIDE 25 MG: 25 TABLET, FILM COATED ORAL at 17:00

## 2025-01-15 RX ADMIN — ATORVASTATIN CALCIUM 40 MG: 40 TABLET, FILM COATED ORAL at 17:00

## 2025-01-15 RX ADMIN — HYDROMORPHONE HYDROCHLORIDE 0.5 MG: 1 INJECTION, SOLUTION INTRAMUSCULAR; INTRAVENOUS; SUBCUTANEOUS at 05:02

## 2025-01-15 RX ADMIN — BUSPIRONE HYDROCHLORIDE 15 MG: 10 TABLET ORAL at 20:45

## 2025-01-15 RX ADMIN — HYDROXYZINE HYDROCHLORIDE 25 MG: 25 TABLET, FILM COATED ORAL at 20:45

## 2025-01-15 RX ADMIN — HYDROMORPHONE HYDROCHLORIDE 0.2 MG: 0.2 INJECTION, SOLUTION INTRAMUSCULAR; INTRAVENOUS; SUBCUTANEOUS at 03:22

## 2025-01-15 RX ADMIN — ENOXAPARIN SODIUM 40 MG: 40 INJECTION SUBCUTANEOUS at 08:28

## 2025-01-15 RX ADMIN — ACETAMINOPHEN 1000 MG: 1000 INJECTION, SOLUTION INTRAVENOUS at 11:01

## 2025-01-15 RX ADMIN — ACETAMINOPHEN 1000 MG: 1000 INJECTION, SOLUTION INTRAVENOUS at 20:44

## 2025-01-15 RX ADMIN — HYDROMORPHONE HYDROCHLORIDE 0.2 MG: 0.2 INJECTION, SOLUTION INTRAMUSCULAR; INTRAVENOUS; SUBCUTANEOUS at 08:24

## 2025-01-15 RX ADMIN — Medication: at 12:10

## 2025-01-15 RX ADMIN — HYDROMORPHONE HYDROCHLORIDE 1 MG: 1 INJECTION, SOLUTION INTRAMUSCULAR; INTRAVENOUS; SUBCUTANEOUS at 09:56

## 2025-01-15 RX ADMIN — MAGNESIUM SULFATE HEPTAHYDRATE 2 G: 40 INJECTION, SOLUTION INTRAVENOUS at 08:28

## 2025-01-15 RX ADMIN — SODIUM CHLORIDE, SODIUM GLUCONATE, SODIUM ACETATE, POTASSIUM CHLORIDE, MAGNESIUM CHLORIDE, SODIUM PHOSPHATE, DIBASIC, AND POTASSIUM PHOSPHATE 100 ML/HR: .53; .5; .37; .037; .03; .012; .00082 INJECTION, SOLUTION INTRAVENOUS at 08:48

## 2025-01-15 RX ADMIN — BENZTROPINE MESYLATE 1 MG: 1 TABLET ORAL at 17:00

## 2025-01-15 RX ADMIN — Medication 1 SPRAY: at 08:55

## 2025-01-15 RX ADMIN — SERTRALINE HYDROCHLORIDE 200 MG: 100 TABLET ORAL at 20:44

## 2025-01-15 RX ADMIN — PANTOPRAZOLE SODIUM 40 MG: 40 INJECTION, POWDER, FOR SOLUTION INTRAVENOUS at 20:45

## 2025-01-15 RX ADMIN — METHOCARBAMOL 1000 MG: 100 INJECTION INTRAMUSCULAR; INTRAVENOUS at 20:47

## 2025-01-15 RX ADMIN — PREGABALIN 150 MG: 75 CAPSULE ORAL at 20:46

## 2025-01-15 RX ADMIN — CEFTRIAXONE SODIUM 2000 MG: 10 INJECTION, POWDER, FOR SOLUTION INTRAVENOUS at 08:26

## 2025-01-15 RX ADMIN — PANTOPRAZOLE SODIUM 40 MG: 40 INJECTION, POWDER, FOR SOLUTION INTRAVENOUS at 08:28

## 2025-01-15 RX ADMIN — METHOCARBAMOL 1000 MG: 100 INJECTION INTRAMUSCULAR; INTRAVENOUS at 11:03

## 2025-01-15 RX ADMIN — BUSPIRONE HYDROCHLORIDE 15 MG: 10 TABLET ORAL at 17:00

## 2025-01-15 NOTE — ASSESSMENT & PLAN NOTE
Noted to have worsening epigastric sharp pain that started 1/8 overnight with an episode of vomiting. Required brief NGT placement.  Patient's symptoms improved with conservative care, but then re-developed 1/13  Imaging consistent with SBO  S/P Ex lap 1/14 per General surgery. General surgery now primary as of 1/14. SLIM will remain on as consult.   NGT in place  Dilaudid PCA per APS

## 2025-01-15 NOTE — PLAN OF CARE
Problem: DISCHARGE PLANNING  Goal: Discharge to home or other facility with appropriate resources  Description: INTERVENTIONS:  - Identify barriers to discharge w/patient and caregiver  - Arrange for needed discharge resources and transportation as appropriate  - Identify discharge learning needs (meds, wound care, etc.)  - Arrange for interpretive services to assist at discharge as needed  - Refer to Case Management Department for coordinating discharge planning if the patient needs post-hospital services based on physician/advanced practitioner order or complex needs related to functional status, cognitive ability, or social support system  Outcome: Progressing     Problem: SAFETY ADULT  Goal: Maintain or return to baseline ADL function  Description: INTERVENTIONS:  -  Assess patient's ability to carry out ADLs; assess patient's baseline for ADL function and identify physical deficits which impact ability to perform ADLs (bathing, care of mouth/teeth, toileting, grooming, dressing, etc.)  - Assess/evaluate cause of self-care deficits   - Assess range of motion  - Assess patient's mobility; develop plan if impaired  - Assess patient's need for assistive devices and provide as appropriate  - Encourage maximum independence but intervene and supervise when necessary  - Involve family in performance of ADLs  - Assess for home care needs following discharge   - Consider OT consult to assist with ADL evaluation and planning for discharge  - Provide patient education as appropriate  Outcome: Progressing     Problem: Knowledge Deficit  Goal: Patient/family/caregiver demonstrates understanding of disease process, treatment plan, medications, and discharge instructions  Description: Complete learning assessment and assess knowledge base.  Interventions:  - Provide teaching at level of understanding  - Provide teaching via preferred learning methods  Outcome: Progressing     Problem: SKIN/TISSUE INTEGRITY - ADULT  Goal:  Incision(s), wounds(s) or drain site(s) healing without S/S of infection  Description: INTERVENTIONS  - Assess and document dressing, incision, wound bed, drain sites and surrounding tissue    Outcome: Progressing     Problem: INFECTION - ADULT  Goal: Absence or prevention of progression during hospitalization  Description: INTERVENTIONS:  - Assess and monitor for signs and symptoms of infection  - Monitor lab/diagnostic results  - Monitor all insertion sites, i.e. indwelling lines, tubes, and drains  - Monitor endotracheal if appropriate and nasal secretions for changes in amount and color  - [place] appropriate cooling/warming therapies per order  - Administer medications as ordered  - Instruct and encourage patient and family to use good hand hygiene technique  - Identify and instruct in appropriate isolation precautions for identified infection/condition  Outcome: Progressing

## 2025-01-15 NOTE — ASSESSMENT & PLAN NOTE
Patient with ileus vs SBO. S/p ex lap on 1/14 for closed loop obstruction    NG tube in place this morning to suction 1/15

## 2025-01-15 NOTE — PROGRESS NOTES
Progress Note - Hospitalist   Name: Samantha Rodriguez 59 y.o. female I MRN: [id number]  [facility] I Date of Admission: 1/9/2025   Date of Service: 1/15/2025 I Hospital Day: 6    Assessment & Plan  Wound infection complicating hardware (HCC)  Initially presented to [facility] 1/2 with ambulatory dysfunction.  Recently discharged from rehab following recent back surgery in December 2024.  On admission to [place], patient met criteria and was diagnosed with UTI and RSV.  Patient also noted to have small area of wound dehiscence at the distal aspect of the wound from recent surgical hardware removal.  CT was ordered which showed ill-defined fluid with pockets of soft tissue gas and surrounding inflammatory stranding in the midline lower back suspicious for an abscess therefore patient was transferred to [place] for orthopedic evaluation.  Recent blood cultures 1/3 negative  S/P I&D lumbar spine 1/10 with ortho. Cloudy fluid noted superficially and fluid tracking deeper to the hardware.   Drain x 2 in place  Cultures growing proteus  IV Ancef switched to IV Ceftriaxone 1/12 per ID  Will require prolonged course of IV abx   PICC line placed 1/13   Pain control per APS recommendations   PT/OT recommending rehab.   Small bowel obstruction due to adhesions (HCC)  Noted to have worsening epigastric sharp pain that started 1/8 overnight with an episode of vomiting. Required brief NGT placement.  Patient's symptoms improved with conservative care, but then re-developed 1/13  Imaging consistent with SBO  S/P Ex lap 1/14 per General surgery. General surgery now primary as of 1/14. SLIM will remain on as consult.   NGT in place  Dilaudid PCA per APS  Lumbar radiculopathy  S/p removal hardware L2-S1, posterior fusion L5-S1, revision instrumentation L2-pelvis, bilateral S2 alar screws with orthopedic surgery on 12/19. Now dehiscence and concern for abscess as above.  Status post I&D as above  Pain control as  above  Chronic pain disorder    Multiple short courses of opioids noted in history.  APS following for assistance with pain management, their recommendations are appreciated.   Bipolar disorder (HCC)  History of severe MDD and AMAURY with panic attacks  Continue PTA medications with Abilify, Zoloft, BuSpar, Minipress, trazodone and Atarax when able to take po  GERD without esophagitis  Continue PPI  Essential hypertension  BP stable  amlodipine and prazosin on hold while NPO  Add prn Lopressor until able to tolerate po  Tardive dyskinesia  Patient is on Ingrezza which is nonformulary, Substitute with Cogentin 1 mg twice daily for now  Outpatient follow-up with psychiatry and neurology  Ambulatory dysfunction  Patient reports after getting back from rehab she has been struggling to walk secondary to her back.  Typically uses walker  Recommended rehab as above   History of CVA (cerebrovascular accident)  Continue aspirin and statin  Anemia  Prior hemoglobin range of 12-13 in 2023 however most recently in the 9s since her surgery in December  Hemoglobin currently stable in the 9s  Monitor  RSV bronchitis  1/3 RSV positive  Chest x-ray without acute findings  Completed 5 days of steroid therapy  Symptoms resolved   History of UTI  Treated with 3 days of IV Ancef for E. coli prior to transfer  Class 2 obesity without serious comorbidity with body mass index (BMI) of 39.0 to 39.9 in adult  Weight loss/lifestyle modifications as outpatient     VTE Pharmacologic Prophylaxis:   High Risk (Score >/= 5) - Pharmacological DVT Prophylaxis Ordered: enoxaparin (Lovenox). Sequential Compression Devices Ordered.    Mobility:   Basic Mobility Inpatient Raw Score: 17  JH-HLM Goal: 5: Stand one or more mins  JH-HLM Achieved: 5: Stand (1 or more minutes)  JH-HLM Goal achieved. Continue to encourage appropriate mobility.    Patient Centered Rounds: I performed bedside rounds with nursing staff today.   Discussions with Specialists or Other  Care Team Provider: General surgery, case management      Current Length of Stay: 6 day(s)  Current Patient Status: Inpatient       Code Status: Level 1 - Full Code    Subjective   Patient c/o abdominal pain    Objective :  Temp:  [98 °F (36.7 °C)-100.4 °F (38 °C)] 100.4 °F (38 °C)  HR:  [72-99] 80  BP: (132-151)/(71-93) 132/81  Resp:  [15-25] 20  SpO2:  [92 %-99 %] 98 %  O2 Device: None (Room air)    Body mass index is 40.99 kg/m².     Input and Output Summary (last 24 hours):     Intake/Output Summary (Last 24 hours) at 1/15/2025 1204  Last data filed at 1/15/2025 0856  Gross per 24 hour   Intake 2690 ml   Output 2345 ml   Net 345 ml       Physical Exam  Vitals and nursing note reviewed.   Constitutional:       General: She is in acute distress (secondary to pain).      Appearance: She is well-developed.   HENT:      Head: Normocephalic and atraumatic.   Cardiovascular:      Rate and Rhythm: Normal rate and regular rhythm.      Heart sounds: No murmur heard.     No friction rub.   Pulmonary:      Effort: Pulmonary effort is normal. No respiratory distress.      Breath sounds: Normal breath sounds. No wheezing.   Abdominal:      General: Bowel sounds are normal. There is distension.      Palpations: Abdomen is soft.      Tenderness: There is abdominal tenderness.      Comments: NGT in place   Skin:     General: Skin is warm and dry.      Findings: No rash.   Neurological:      Mental Status: She is alert and oriented to person, place, and time.      Cranial Nerves: No cranial nerve deficit.      Comments: Tardive dyskinesia            Lines/Drains:  Lines/Drains/Airways       Active Status       Name Placement date Placement time Site Days    PICC Line 01/13/25 Left Basilic 01/13/25  1118  Basilic  2    Closed/Suction Drain Right Back Bulb 19 Fr. 01/10/25  1234  Back  4    Closed/Suction Drain Left Back Bulb 19 Fr. 01/10/25  1234  Back  4    NG/OG/Enteral Tube Nasogastric 18 Fr Right nare 01/14/25  1243  Right nare   less than 1    Urethral Catheter Latex 16 Fr. 01/14/25 2030  Latex  less than 1                  Urinary Catheter:  Goal for removal: Plan to remove POD#1         Central Line:  Goal for removal: N/A - Discharging with PICC for IV ABX/medications               Lab Results: I have reviewed the following results:   Results from last 7 days   Lab Units 01/15/25  0511 01/14/25  0525 01/13/25  0509   WBC Thousand/uL 16.76*   < > 5.51   HEMOGLOBIN g/dL 9.0*   < > 8.1*   HEMATOCRIT % 29.6*   < > 26.7*   PLATELETS Thousands/uL 503*   < > 452*   SEGS PCT %  --   --  52   LYMPHO PCT %  --   --  29   MONO PCT %  --   --  7   EOS PCT %  --   --  10*    < > = values in this interval not displayed.     Results from last 7 days   Lab Units 01/15/25  0511   SODIUM mmol/L 140   POTASSIUM mmol/L 4.0   CHLORIDE mmol/L 104   CO2 mmol/L 28   BUN mg/dL 9   CREATININE mg/dL 0.52*   ANION GAP mmol/L 8   CALCIUM mg/dL 7.7*   GLUCOSE RANDOM mg/dL 125         Results from last 7 days   Lab Units 01/10/25  1427   POC GLUCOSE mg/dl 88         Results from last 7 days   Lab Units 01/14/25  1309 01/10/25  1101   LACTIC ACID mmol/L 1.2 0.5       Recent Cultures (last 7 days):   Results from last 7 days   Lab Units 01/10/25  1227 01/10/25  1220   GRAM STAIN RESULT  No Polys or Bacteria seen 3+ Polys  No organisms seen       Imaging Results Review: I reviewed radiology reports from this admission including: CT abdomen/pelvis.      Last 24 Hours Medication List:     Current Facility-Administered Medications:     acetaminophen (Ofirmev) injection 1,000 mg, Q8H KESHIA, Last Rate: 1,000 mg (01/15/25 1101)    albuterol (PROVENTIL HFA,VENTOLIN HFA) inhaler 2 puff, Q4H PRN    aluminum-magnesium hydroxide-simethicone (MAALOX) oral suspension 30 mL, Q4H PRN    amLODIPine (NORVASC) tablet 5 mg, Daily    ARIPiprazole (ABILIFY) tablet 10 mg, Daily    aspirin (ECOTRIN LOW STRENGTH) EC tablet 81 mg, Daily    atorvastatin (LIPITOR) tablet 40 mg, Daily With  Dinner    benztropine (COGENTIN) tablet 1 mg, BID    bupivacaine liposomal (EXPAREL) 1.3 % injection 20 mL, Once    busPIRone (BUSPAR) tablet 15 mg, TID    cefTRIAXone (ROCEPHIN) 2,000 mg in dextrose 5 % 50 mL IVPB, Q24H, Last Rate: Stopped (01/15/25 0856)    dextromethorphan-guaiFENesin (ROBITUSSIN DM) oral syrup 10 mL, Q4H PRN    enoxaparin (LOVENOX) subcutaneous injection 40 mg, Daily    HYDROmorphone (DILAUDID) 1 mg/mL 50 mL PCA, Continuous    hydrOXYzine HCL (ATARAX) tablet 25 mg, TID    lidocaine (LIDODERM) 5 % patch 1 patch, Daily    [Held by provider] lubiprostone (AMITIZA) capsule 8 mcg, BID    magnesium sulfate 2 g/50 mL IVPB (premix) 2 g, Once, Last Rate: 2 g (01/15/25 0828)    methocarbamol (ROBAXIN) injection 1,000 mg, Q8H KESHIA    multi-electrolyte (PLASMALYTE-A/ISOLYTE-S PH 7.4) IV solution, Continuous, Last Rate: 100 mL/hr (01/15/25 0848)    naloxone (NARCAN) 0.04 mg/mL syringe 0.04 mg, Q1MIN PRN    ondansetron (ZOFRAN) injection 4 mg, Q6H PRN    [Held by provider] oxyCODONE (ROXICODONE) immediate release tablet 10 mg, Q4H PRN    [Held by provider] oxyCODONE (ROXICODONE) IR tablet 5 mg, Q4H PRN    pantoprazole (PROTONIX) injection 40 mg, Q12H KESHIA    phenol (CHLORASEPTIC) 1.4 % mucosal liquid 1 spray, Q2H PRN    pneumococcal 20-cheryl conj vacc (PREVNAR 20) IM Injection 0.5 mL, Once PRN    prazosin (MINIPRESS) capsule 2 mg, HS    pregabalin (LYRICA) capsule 150 mg, TID    sertraline (ZOLOFT) tablet 200 mg, HS    traZODone (DESYREL) tablet 300 mg, HS PRN    Administrative Statements   Today, Patient Was Seen By: Lou Reed PA-C      **Please Note: This note may have been constructed using a voice recognition system.**

## 2025-01-15 NOTE — PLAN OF CARE
Problem: PHYSICAL THERAPY ADULT  Goal: Performs mobility at highest level of function for planned discharge setting.  See evaluation for individualized goals.  Description: Treatment/Interventions: Functional transfer training, LE strengthening/ROM, Elevations, Therapeutic exercise, Endurance training, Patient/family training, Equipment eval/education, Bed mobility, Gait training, Spoke to nursing, OT          See flowsheet documentation for full assessment, interventions and recommendations.  Note: Prognosis: Good  Problem List: Decreased strength, Decreased endurance, Impaired balance, Decreased mobility, Pain, Orthopedic restrictions  Assessment: Pt originally presented to [facility] on 1/9/2025 and was admitted with a primary diagnosis of wound infection complicating hardware, and other active problems including small bowel obstruction due to adhesions, lumbar radiculopathy, chronic pain disorder, bipolar disorder, GERD without esophagitis, essential HTN, tardive dyskinesia, amatory dysfunction, history of CVA, anemia.  Patient had undergone I&D of lumbar spine with Ortho.  patient seen for PT reevaluation today due to patient undergoing ex lap, lysis of adhesion freeing up closed-loop obstruction which was performed on 1/14/2025.  PT reevaluation performed to assess patient's current functional mobility status and reestablish PT goals.  Patient was able to perform all bed mobility and transfers with min a x 1 which is slightly prepared to previous session.  Patient was able to tolerate ambulation with min a x 1, but distances remain limited by NG tube which is low wall suction at current time.  Overall patient continues to present with decreased bilateral lower extremity strength, decreased endurance, gait deviations, decreased static/dynamic balance, decreased activity tolerance, and will continue to benefit from skilled PT services during hospital stay.  At conclusion of PT reevaluation patient was assisted back  into chair with all needs within reach.  D/C recommendation when medically cleared is level II resource intensity.        Rehab Resource Intensity Level, PT: II (Moderate Resource Intensity)    See flowsheet documentation for full assessment.

## 2025-01-15 NOTE — OP NOTE
OPERATIVE REPORT  PATIENT NAME: Samantha Rodriguez    :  1965  MRN: [id number]  Pt Location: BE OR ROOM 04    SURGERY DATE: 2025    Surgeons and Role:     * Chandni Byrne DO - Primary     * Junito Martinez MD - Assisting     * Michael Bills MD - Assisting    Preop Diagnosis:  Abdominal pain [R10.9]    Post-Op Diagnosis Codes:     * Abdominal pain [R10.9]    Procedure(s):  LAPAROTOMY EXPLORATORY. lysis of adhesions    Specimen(s):  * No specimens in log *    Estimated Blood Loss:   50 mL    Drains:  Closed/Suction Drain Right Back Bulb 19 Fr. (Active)   Site Description Unable to view 25 0515   Dressing Status Clean;Dry;Intact 25 0515   Drainage Appearance Serosanguineous 25 1300   Status To bulb suction 25 0515   Output (mL) 30 mL 25 1300   Number of days: 4       Closed/Suction Drain Left Back Bulb 19 Fr. (Active)   Site Description Unable to view 25 0515   Dressing Status Clean;Dry;Intact 25 0515   Drainage Appearance Serosanguineous 25 1300   Status To bulb suction 25 0515   Output (mL) 5 mL 25 1300   Number of days: 4       NG/OG/Enteral Tube Nasogastric 18 Fr Right nare (Active)   Site Assessment Clean;Dry;Intact 25 1401   Status Suction-low continuous 25 1401   Drainage Appearance Coffee ground 25 1401   Output (mL) 600 mL 25 1401   Number of days: 0       Urethral Catheter Latex 16 Fr. (Active)   Number of days: 0       Anesthesia Type:   Choice    Operative Indications:  Abdominal pain [R10.9]    Operative Findings:  Exploratory laparotomy  Lysis of adhesions for greater than 30 minutes  Decompressed proximal jejunum with smooth transition to dilated through the distal jejunum, dense adhesions to the anterior abdominal wall in the pelvis related to previous left paramedian incision causing clear obstruction point, completely lysed    Complications:   None    Procedure and Technique:  Patient was brought into the  operating room and identity and procedure were confirmed.  Patient was placed supine on the operating room table and general anesthesia was induced.  The abdomen was prepped and draped in the usual sterile fashion.  Timeout was performed and all parties were in agreement.  Vertical midline skin incision was made using a 10 blade scalpel.  Dissection was carried down through dermis and subcutaneous tissues using Bovie electrocautery in order to expose the fascia along the length of the incision.  The fascia was scored using Bovie electrocautery just above the umbilicus.  The peritoneum was grasped between 2 hemostats and divided in the midline using Metzenbaum scissors.  A finger was slid under the peritoneum and the fascia and peritoneum were opened along the length of the incision using Bovie electrocautery.  The small bowel was severely dilated on entry.  Omental adhesions to the anterior abdominal wall in the lower abdomen were taken down using LigaSure device.  Small bowel was then eviscerated and run from the ligament of Treitz to the terminal ileum.  The proximal jejunum appeared completely decompressed and there was smooth transition to dilated in the distal jejunum.  A clear transition point was encountered secondary to dense adhesions in the area of patient's previous left paramedian incision in the pelvis.  The small bowel was dissected off the anterior abdominal wall using a combination of blunt dissection and Metzenbaum scissors.  Once this loop was freed the remainder of the bowel was run to the terminal ileum and all appeared decompressed and normal.  The bowel was returned to the abdomen.  The abdomen was copiously irrigated with normal saline and suctioned out.  The NG tube was confirmed in the stomach.  The midline fascia was closed using running #1 PDS.  The deep dermal layer was reapproximated using interrupted 3-0 Vicryl suture.  The skin was then closed using staples.  The abdomen was washed and  dried and Mepilex dressing was applied.  The patient was then awakened in the operating room and returned to the PACU in stable condition having tolerated the procedure well.  Dr. Byrne was present for the entire procedure.    Patient Disposition:  PACU     This procedure was not performed to treat colon cancer through resection           SIGNATURE: Junito Martinez MD  DATE: January 14, 2025  TIME: 9:59 PM

## 2025-01-15 NOTE — PLAN OF CARE
Problem: OCCUPATIONAL THERAPY ADULT  Goal: Performs self-care activities at highest level of function for planned discharge setting.  See evaluation for individualized goals.  Description: Treatment Interventions: ADL retraining, Functional transfer training, Endurance training, Cognitive reorientation, Patient/family training, Equipment evaluation/education, Compensatory technique education, Energy conservation, Activityengagement          See flowsheet documentation for full assessment, interventions and recommendations.   Outcome: Progressing  Note: Limitation: Decreased ADL status, Decreased cognition, Decreased endurance, Decreased self-care trans, Decreased high-level ADLs  Prognosis: Good  Assessment: PT SEEN FOR OT REEVAL S/P EX LAP/LYSIS OF ADHESIONS ON 1/14/25 WITH NGT IN PLACE. PT REMAINS WITH LSO AND SPINAL PRECAUTIONS S/P I&D OF LUMBAR SPINE WITH ORTHO ON 1/10. SEE INITIAL OT EVAL FOR FURTHER DETAILS. PT CURRENTLY REQUIRES OVERALL MIN-MOD A WITH ADLS AND MIN A WITH TRANSFERS / FUNCTIONAL MOBILITY WITH USE OF RW. PT IS LIMITED 2' PAIN, FATIGUE, IMPAIRED BALANCE, FALL RISK , SPINAL PRECAUTIONS, OVERALL WEAKNESS/DECONDITIONING , LIMITED FAMILY/FRIEND SUPPORT , INACCESSIBLE HOME ENVIRONMENT, and OVERALL LIMITED ACTIVITY TOLERANCE. PT EDUCATED ON SPINAL PRECAUTIONS, DEEP BREATHING TECHNIQUES T/O ACTIVITY, SLOWING OF PACE, ENERGY CONSERVATION TECHNIQUES FOR CARRY OVER UPON D/C, INCREASED FAMILY SUPPORT, and CONTINUE PARTICIPATION IN SELF-CARE/MOBILITY WITH STAFF WHILE IN THE HOSPITAL . The patient's raw score on the AM-PAC Daily Activity Inpatient Short Form is 14. A raw score of less than 19 suggests the patient may benefit from discharge to post-acute rehabilitation services. Please refer to the recommendation of the Occupational Therapist for safe discharge planning. CONT TO RECOMMEND LEVEL II RESOURCES. INITIAL OT GOALS REMAIN APPROPRIATE. GOAL DATE EXTENDED +14 DAYS.     Rehab Resource Intensity Level,  OT: II (Moderate Resource Intensity)

## 2025-01-15 NOTE — ANESTHESIA POSTPROCEDURE EVALUATION
Post-Op Assessment Note    CV Status:  Stable  Pain Score: 0    Pain management: adequate       Mental Status:  Awake and sleepy   Hydration Status:  Stable   PONV Controlled:  None   Airway Patency:  Patent     Post Op Vitals Reviewed: Yes    No anethesia notable event occurred.    Staff: Anesthesiologist           Last Filed PACU Vitals:  Vitals Value Taken Time   Temp 99.4 °F (37.4 °C) 01/14/25 2300   Pulse 86 01/14/25 2312   /71 01/14/25 2300   Resp 22 01/14/25 2310   SpO2 95 % 01/14/25 2312   Vitals shown include unfiled device data.    Modified Gala:     Vitals Value Taken Time   Activity 2 01/14/25 2230   Respiration 2 01/14/25 2230   Circulation 2 01/14/25 2230   Consciousness 2 01/14/25 2230   Oxygen Saturation 2 01/14/25 2230     Modified Gala Score: 10

## 2025-01-15 NOTE — ASSESSMENT & PLAN NOTE
Patient is on Ingrezza which is nonformulary, Substitute with Cogentin 1 mg twice daily for now  Outpatient follow-up with psychiatry and neurology

## 2025-01-15 NOTE — ASSESSMENT & PLAN NOTE
Initially presented to [facility] 1/2 with ambulatory dysfunction.  Recently discharged from rehab following recent back surgery in December 2024.  On admission to [facility], patient met criteria and was diagnosed with UTI and RSV.  Patient also noted to have small area of wound dehiscence at the distal aspect of the wound from recent surgical hardware removal.  CT was ordered which showed ill-defined fluid with pockets of soft tissue gas and surrounding inflammatory stranding in the midline lower back suspicious for an abscess therefore patient was transferred to [place] for orthopedic evaluation.  Recent blood cultures 1/3 negative  S/P I&D lumbar spine 1/10 with ortho. Cloudy fluid noted superficially and fluid tracking deeper to the hardware.   Drain x 2 in place  Cultures growing proteus  IV Ancef switched to IV Ceftriaxone 1/12 per ID  Will require prolonged course of IV abx   PICC line placed 1/13   Pain control per APS recommendations   PT/OT recommending rehab.

## 2025-01-15 NOTE — OCCUPATIONAL THERAPY NOTE
Occupational Therapy REEvaluation     Patient Name: Samantha Rodriguez  Today's Date: 1/15/2025  Problem List  Principal Problem:    Wound infection complicating hardware (HCC)  Active Problems:    Chronic pain disorder    Lumbar radiculopathy    Bipolar disorder (HCC)    GERD without esophagitis    Essential hypertension    Class 2 obesity without serious comorbidity with body mass index (BMI) of 39.0 to 39.9 in adult    Tardive dyskinesia    Ambulatory dysfunction    History of CVA (cerebrovascular accident)    Anemia    RSV bronchitis    Small bowel obstruction due to adhesions (HCC)    History of UTI    Past Medical History  Past Medical History:   Diagnosis Date    Anxiety     Arthritis     left knee    Arthritis of right knee 10/06/2020    At risk for falls     Bipolar 2 disorder (HCC)     FOLLOWS WITH PSYCHIATRIST. CONTINUE LAMOTRIGINE; RESOLVED: [id number]    Depression     Dysphagia     per [facility] facility paperwork    Familial tremor     both hands    Fibromyalgia     LAST ASSESSED: 08DEC2017    GERD (gastroesophageal reflux disease)     Hearing aid worn     left ear    [place] (hard of hearing)     left ear    Hyperlipidemia     Hypertension     Impaired speech     Left-sided weakness     Lumbar disc disease with radiculopathy 02/02/2018    Memory loss of unknown cause     long and short term    Migraine     Multiple closed fractures of metatarsal bone of right foot 05/02/2021    Obesity     Obesity, Class II, BMI 35-39.9     Osteoarthritis of both hips 10/31/2016    Osteoarthritis of knee 02/20/2013    Description: Continue Tylenol and Naproxen. Encourage exercise and weight loss. Patient refused physical therapy. I will refer the patient back to Orthopedics.    Overactive bladder     Panic attack     Patellofemoral disorder of both knees 05/01/2020    Post traumatic stress disorder     Primary localized osteoarthritis of both knees 06/16/2017    Primary osteoarthritis of both knees 05/01/2020    S/P insertion  of spinal cord stimulator     no remote    S/P total knee arthroplasty, right 03/10/2022    Sacroiliitis (HCC) 2017    Seasonal allergies     Seizure-like activity (HCC) 2022    Seizures (HCC)     possible seizure like activity    Small bowel obstruction (HCC) 2023    Status post total knee replacement, left 2022    Stroke (HCC)     questionable stroke     Tardive dyskinesia     PATIENT STATES    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  CONT ASA 81 MG DAILY; RESOLVED: 2015    Urinary incontinence     Uses walker     Wears dentures     partial lower / full upper- doesnt wear    Wears glasses      Past Surgical History  Past Surgical History:   Procedure Laterality Date    BACK SURGERY       SECTION      COLONOSCOPY      RESOLVED: 2016    EAR SURGERY      EGD      HYSTERECTOMY      INCISION AND DRAINAGE POSTERIOR SPINE Bilateral 1/10/2025    Procedure: Incision and Drainage Lumbar Spine;  Surgeon: Bayron Bills MD;  [facility]: [facility];  Service: Orthopedics    LAPAROTOMY N/A 2025    Procedure: LAPAROTOMY EXPLORATORY, lysis of adhesions;  Surgeon: Chandni Byrne DO;  [facility];  Service: General    LUMBAR FUSION N/A 2024    Procedure: Removal hardware L2-S1, posterior fusion L5-S1, navigated revision instrumentation L2 to pelvis; application of bilateral S2 alar screws and bone grafting of the disc at L5-S1;  Surgeon: Yenni Stevens MD;  [facility];  Service: Orthopedics    MYRINGOTOMY W/ TUBES Left     NECK SURGERY  2019    FL ARTHRP KNE CONDYLE&PLATU MEDIAL&LAT COMPARTMENTS Right 2022    Procedure: ARTHROPLASTY KNEE TOTAL;  Surgeon: Chandni Tuttle DO;  [facility];  Service: Orthopedics    FL ARTHRP KNE CONDYLE&PLATU MEDIAL&LAT COMPARTMENTS Left 2022    Procedure: ARTHROPLASTY KNEE TOTAL;  Surgeon: Chandni Tuttle DO;  [facility];  Service: Orthopedics    FL  CYSTOURETHROSCOPY N/A 02/18/2016    Procedure: CYSTOSCOPY, BOTOX INJECTION;  Surgeon: Abraham Teague MD;  [facility];  Service: Gynecology    CO INSJ/RPLCMT SPINAL NPG/RCVR POCKET CRTJ&CONNJ Right 02/10/2021    Procedure: REPLACEMENT IMPLANTABLE PULSE GENERATOR DORSAL SPINAL COLUMN STIMULATOR, RIGHT;  Surgeon: Carlos Alberto Jacome MD;  [facility];  Service: Neurosurgery    CO PRQ IMPLTJ NSTIM ELECTRODE ARRAY EPIDURAL Right 07/28/2020    Procedure: INSERTION THORACIC DORSAL COLUMN SPINAL CORD STIMULATOR PERCUTANEOUS W IMPLANTABLE PULSE GENERATOR, RIGHT;  Surgeon: Carlos Alberto Jacome MD;  Location: [facility];  Service: Neurosurgery    TONSILLECTOMY      TUBAL LIGATION  1986    UPPER GASTROINTESTINAL ENDOSCOPY  09/2020      01/15/25 1146   OT Last Visit   OT Visit Date 01/15/25   Note Type   Note type Re-Evaluation   Pain Assessment   Pain Assessment Tool 0-10   Pain Score 8   Pain Location/Orientation Location: Abdomen   Patient's Stated Pain Goal No pain   Hospital Pain Intervention(s) Repositioned;Ambulation/increased activity;Emotional support   Restrictions/Precautions   Weight Bearing Precautions Per Order No   Braces or Orthoses (S)  LSO   Other Precautions Cognitive;Chair Alarm;Bed Alarm;Multiple lines;Fall Risk;Pain;Spinal precautions  (NG TUBE; 2 SUSAN DRAINS)   Home Living   Type of Home House   Home Layout Two level;Able to live on main level with bedroom/bathroom;Stairs to enter with rails   Home Equipment Walker  (ROLLATOR)   Prior Function   Level of [place] Needs assistance with functional mobility   Lives With Daughter;Family   Receives Help From Family   IADLs Family/Friend/Other provides transportation;Family/Friend/Other provides meals;Family/Friend/Other provides medication management   Falls in the last 6 months 0   Vocational Retired   Lifestyle   Autonomy A with ADLs and functional mobility with rollator   Reciprocal Relationships LIVES WITH DAUGHTER, CLARENCE AND GRANDCHILDREN. PT  "REPORTS DAUGHTER \"CAN'T TAKE CARE OF ME\"   Service to Others Retired   Intrinsic Gratification ENJOYS SPENDING TIME WITH FAMILY.   ADL   Eating Assistance 5  Supervision/Setup   Grooming Assistance 5  Supervision/Setup   UB Bathing Assistance 4  Minimal Assistance   LB Bathing Assistance 3  Moderate Assistance   UB Dressing Assistance 3  Moderate Assistance   LB Dressing Assistance 3  Moderate Assistance   Toileting Assistance  3  Moderate Assistance   Functional Assistance 3  Moderate Assistance   Bed Mobility   Supine to Sit 4  Minimal assistance   Additional items Assist x 1;Increased time required;Verbal cues;LE management   Transfers   Sit to Stand 4  Minimal assistance   Additional items Assist x 1;Increased time required;Verbal cues   Stand to Sit 4  Minimal assistance   Additional items Assist x 1;Increased time required;Verbal cues   Functional Mobility   Functional Mobility 4  Minimal assistance   Additional items Rolling walker   Balance   Static Sitting Fair   Static Standing Fair -   Ambulatory Poor +   Activity Tolerance   Activity Tolerance Patient limited by fatigue;Patient limited by pain   Medical Staff Made Aware PT SEEN FOR CO-SESSION WITH SKILLED PHYSICAL THERAPIST 2' CLINICALLY UNSTABLE PRESENTATION, NEW PRECAUTIONS/LIMITATIONS, LIMITED ACTIVITY TOLERANCE AND PRESENT IMPAIRMENTS WHICH ARE A REGRESSION FROM THE PT'S BASELINE AND IMPACTING OVERALL OCCUPATIONAL PERFORMANCE.   Nurse Made Aware APPROPRIATE TO SEE   RUE Assessment   RUE Assessment WFL   LUE Assessment   LUE Assessment WFL   Cognition   Overall Cognitive Status WFL   Arousal/Participation Alert;Cooperative   Attention Within functional limits   Orientation Level Oriented X4   Memory Decreased recall of precautions   Following Commands Follows one step commands without difficulty   Assessment   Limitation Decreased ADL status;Decreased cognition;Decreased endurance;Decreased self-care trans;Decreased high-level ADLs   Prognosis Good "   Assessment PT SEEN FOR OT REEVAL S/P EX LAP/LYSIS OF ADHESIONS ON 1/14/25 WITH NGT IN PLACE. PT REMAINS WITH LSO AND SPINAL PRECAUTIONS S/P I&D OF LUMBAR SPINE WITH ORTHO ON 1/10. SEE INITIAL OT EVAL FOR FURTHER DETAILS. PT CURRENTLY REQUIRES OVERALL MIN-MOD A WITH ADLS AND MIN A WITH TRANSFERS / FUNCTIONAL MOBILITY WITH USE OF RW. PT IS LIMITED 2' PAIN, FATIGUE, IMPAIRED BALANCE, FALL RISK , SPINAL PRECAUTIONS, OVERALL WEAKNESS/DECONDITIONING , LIMITED FAMILY/FRIEND SUPPORT , INACCESSIBLE HOME ENVIRONMENT, and OVERALL LIMITED ACTIVITY TOLERANCE. PT EDUCATED ON SPINAL PRECAUTIONS, DEEP BREATHING TECHNIQUES T/O ACTIVITY, SLOWING OF PACE, ENERGY CONSERVATION TECHNIQUES FOR CARRY OVER UPON D/C, INCREASED FAMILY SUPPORT, and CONTINUE PARTICIPATION IN SELF-CARE/MOBILITY WITH STAFF WHILE IN THE HOSPITAL . The patient's raw score on the -PAC Daily Activity Inpatient Short Form is 14. A raw score of less than 19 suggests the patient may benefit from discharge to post-acute rehabilitation services. Please refer to the recommendation of the Occupational Therapist for safe discharge planning. CONT TO RECOMMEND LEVEL II RESOURCES. INITIAL OT GOALS REMAIN APPROPRIATE. GOAL DATE EXTENDED +14 DAYS.   Goals   Patient Goals TO GET THE NGT OUT AND EAT/DRINK   LTG Time Frame   (+14 DAYS)   Long Term Goal #1 SEE INITIAL OT EVAL FOR GOALS   Plan   Treatment Interventions ADL retraining;Functional transfer training;Endurance training;Cognitive reorientation;Patient/family training;Equipment evaluation/education;Compensatory technique education;Energy conservation;Activityengagement   Goal Expiration Date 01/29/25   OT Treatment Day 1   OT Frequency 2-3x/wk   Discharge Recommendation   Rehab Resource Intensity Level, OT II (Moderate Resource Intensity)   AM-PAC Daily Activity Inpatient   Lower Body Dressing 2   Bathing 2   Toileting 2   Upper Body Dressing 2   Grooming 3   Eating 3   Daily Activity Raw Score 14   Daily Activity  Standardized Score (Calc for Raw Score >=11) 33.39   AM-PAC Applied Cognition Inpatient   Following a Speech/Presentation 3   Understanding Ordinary Conversation 4   Taking Medications 3   Remembering Where Things Are Placed or Put Away 3   Remembering List of 4-5 Errands 3   Taking Care of Complicated Tasks 3   Applied Cognition Raw Score 19   Applied Cognition Standardized Score 39.77       Documentation completed by BAMBI Bledsoe, OTR/L  M[facility] Certified ID# [id number]

## 2025-01-15 NOTE — ASSESSMENT & PLAN NOTE
Status post removal of hardware L2-S1, posterior fusion L5-S1, revision instrumentation L2-pelvis, bilateral S2 alar screws with orthopedic surgery on 12/19.  Presented with wound dehiscence and concern for abscess.  Now status post I&D lumbar spine 1/10/2025.

## 2025-01-15 NOTE — ASSESSMENT & PLAN NOTE
Patient with hx of chronic pain  Multiple short prescriptions of opioids over the past few months    Outpatient regimen includes:  MS IR 15 mg tablets twice daily as needed (recently filled 12/30/2024 #30tabs/15days)  Diclofenac sodium 2 g gel 4 times daily as needed  Cyclobenzaprine 10 mg 3 times daily as needed  Lyrica 150 mg 3 times daily    PDMP review:  Filled  Written  Drug  QTY  Days  Prescriber  Refill  Daily Dose*      12/30/2024 12/30/2024 Pregabalin 150 Mg Capsule 45.00 15 Mi Rum 0 3.01 LME PA   12/30/2024 12/30/2024 Morphine Sulfate Ir 15 Mg Tab 30.00 15 Mi Rum 0 30.00 MME PA   12/03/2024 12/02/2024 Morphine Sulfate Ir 15 Mg Tab 60.00 30 Ch residential 0 30.00 MME PA   12/03/2024 12/02/2024 Pregabalin 150 Mg Capsule 90.00 30 Ch residential 0 3.01 LME PA   11/13/2024 10/22/2024 Morphine Sulfate Ir 15 Mg Tab 24.00 4 Mi Rum 0 90.00 MME PA   11/08/2024 11/08/2024 Pregabalin 150 Mg Capsule 90.00 30 Mi Rum 0 3.01 LME PA   11/03/2024 09/28/2024 Pregabalin 150 Mg Capsule 6.00 2 Mi Rum 0 3.01 LME PA   11/03/2024 10/22/2024 Morphine Sulfate Ir 15 Mg Tab 30.00 5 Mi Rum 0 90.00 MME PA   10/23/2024 10/22/2024 Morphine Sulfate Ir 15 Mg Tab 30.00 5 Mi Rum 0 90.00 MME PA   10/22/2024 10/10/2024 Morphine Sulfate Ir 15 Mg Tab 6.00 1 Mi Rum 0 90.00 MME PA   10/12/2024 09/28/2024 Pregabalin 150 Mg Capsule 42.00 14 Mi Rum 0 3.01 LME PA   10/10/2024 10/10/2024 Morphine Sulfate Ir 15 Mg Tab 24.00 4 Mi Rum 0 90.00 MME PA   09/28/2024 09/28/2024 Pregabalin 150 Mg Capsule 42.00 14 Mi Rum 0 3.01 LME PA   09/28/2024 09/28/2024 Morphine Sulfate Ir 15 Mg Tab 30.00 5 Mi Rum 0 90.00 MME PA   09/05/2024 09/05/2024 Pregabalin 100 Mg Capsule 90.00 30 Am Yes 0 2.01 LME PA

## 2025-01-15 NOTE — PROGRESS NOTES
Progress Note - Infectious Disease   Name: Samantha Rodriguez 59 y.o. female I MRN: [id number]  Unit/Bed#: [facility] 615-01 I Date of Admission: 1/9/2025   Date of Service: 1/15/2025 I Hospital Day: 6    Assessment & Plan  Wound infection complicating hardware (HCC)  Presenting with delayed wound dehiscence in setting of recent fusion surgery as below.  Status post I&D 1/10.25.  Intraoperative findings notable for wound dehiscence to level of fascia which appeared intact.  Fascia was opened and deep space was explored.  Superficial and deep cultures grew Proteus.  Concern for HW involvement.    Continue ceftriaxone 2g IV Q24 through 2/21/25 to complete 6 weeks postop given concern for HW infection  Monitor closely while on antibiotics for toxicities including diarrhea, rash, cytopenias, or kidney injury  Check weekly CBC-diff, CMP while on IV antibiotics to monitor for toxicities.  D/C planning for STR  Outpatient follow-up with ID 2 weeks after D/C  D/C PICC after last dose of IV antibiotics  Given retained HW, will likely need suppressive antibiotics after IV course complete  Lumbar radiculopathy  Remote fusion L2-S1.  Underwent removal hardware L2-S1, posterior fusion L5-S1, revision instrumentation L2 to pelvis; application of bilateral S2 alar screws and bone grafting of the disc at L5-S1 12/19/24.  Complicated by infection as above.  RSV bronchitis  Patient tested positive on 1/3/25.  Mild URI symptoms, no O2 requirement.  Improved, off isolation.  Small bowel obstruction due to adhesions (HCC)  Recurrent.  Status post XLap, ALLISON 1/14.        Antibiotics:  Ceftriaxone  POD #4    Subjective   Patient had CT A/P which showed SBO.  Taken to OR last night for XLap, ALLISON.  In pain this AM.  Status she is passing gas.    Objective :  Temp:  [98 °F (36.7 °C)-100.4 °F (38 °C)] 100.4 °F (38 °C)  HR:  [72-99] 80  BP: (132-151)/(71-93) 132/81  Resp:  [15-25] 20  SpO2:  [92 %-99 %] 98 %  O2 Device: None (Room air)    General:  No  acute distress  Psychiatric:  Awake and alert  Pulmonary:  Normal respiratory excursion without accessory muscle use  Abdomen:  Soft, nondistended  Extremities:  No edema  Skin:  No rashes      Lab Results: I have reviewed the following results:  Results from last 7 days   Lab Units 01/15/25  0511 01/14/25  0525 01/13/25  0509   WBC Thousand/uL 16.76* 6.58 5.51   HEMOGLOBIN g/dL 9.0* 9.0* 8.1*   PLATELETS Thousands/uL 503* 465* 452*     Results from last 7 days   Lab Units 01/15/25  0511 01/14/25  0525 01/13/25  0509   SODIUM mmol/L 140 141 139   POTASSIUM mmol/L 4.0 3.7 3.8   CHLORIDE mmol/L 104 104 106   CO2 mmol/L 28 29 28   BUN mg/dL 9 9 12   CREATININE mg/dL 0.52* 0.51* 0.48*   EGFR ml/min/1.73sq m 103 104 106   CALCIUM mg/dL 7.7* 8.1* 7.4*     Results from last 7 days   Lab Units 01/10/25  1227 01/10/25  1220   GRAM STAIN RESULT  No Polys or Bacteria seen 3+ Polys  No organisms seen

## 2025-01-15 NOTE — ASSESSMENT & PLAN NOTE
Samantha is a 58 y/o F presenting 4 weeks s/p  weeks s/p removal hardware L2-S1, posterior fusion L5-S1, revision instrumentation L2-pelvis, bilateral S2 alar screws with Dr. Bills and Dr. Stevens (DOS 12/19/24) with concern for wound dehiscence.      Now s/p I&D lumbar spine. Doing well. OR cultures growing proteus in both the deep and superficial cultures. ID changed abx to IV ceftriaxone pending suseptibilities. ID recommending 6wks IV abx. Pt taken for ex lap yesterday for obstruction, feels better this am.     WBAT all extremities  Regular diet  Continue abx per id  ID consulted, appreciate recs   Picc line placement and 6wks iv ceftriaxone  Monitor 2x lumbar drains   Multimodal pain control  PT/OT

## 2025-01-15 NOTE — ASSESSMENT & PLAN NOTE
Patient tested positive on 1/3/25.  Mild URI symptoms, no O2 requirement.  Improved, off isolation.

## 2025-01-15 NOTE — PROGRESS NOTES
Progress Note - Orthopedics   Name: Samantha Rodriguez 59 y.o. female I MRN: [id number]  [facility] Date of Admission: 1/9/2025   Date of Service: 1/15/2025 I Hospital Day: 6    Assessment & Plan  Lumbar radiculopathy  See above  Class 2 obesity without serious comorbidity with body mass index (BMI) of 39.0 to 39.9 in adult    Wound infection complicating hardware (HCC)  Samantha is a 58 y/o F presenting 4 weeks s/p  weeks s/p removal hardware L2-S1, posterior fusion L5-S1, revision instrumentation L2-pelvis, bilateral S2 alar screws with Dr. Bills and Dr. Stevens (DOS 12/19/24) with concern for wound dehiscence.      Now s/p I&D lumbar spine. Doing well. OR cultures growing proteus in both the deep and superficial cultures. ID changed abx to IV ceftriaxone pending suseptibilities. ID recommending 6wks IV abx. Pt taken for ex lap yesterday for obstruction, feels better this am.     WBAT all extremities  Regular diet  Continue abx per id  ID consulted, appreciate recs   Picc line placement and 6wks iv ceftriaxone  Monitor 2x lumbar drains   Multimodal pain control  PT/OT        Subjective   59 y.o.female doing well. No acute events, no new complaints. Pain well controlled. Denies fevers, chills, CP, SOB, N/V, numbness or tingling. Patient reports no issues with urination. Feels better after the ex lap yesterday.     Objective :  Temp:  [98 °F (36.7 °C)-99.4 °F (37.4 °C)] 99.2 °F (37.3 °C)  HR:  [72-99] 72  BP: (128-151)/(71-93) 145/93  Resp:  [15-25] 19  SpO2:  [92 %-99 %] 99 %  O2 Device: None (Room air)    Physical Exam  Musculoskeletal: bilateral lower  Skin intact . No erythema or ecchymosis.  Dressing c/d/i  TTP sona-incisional   Motor intact to +FHL/EHL, +ankle dorsi/plantar flexion  2+ DP pulse  Sensation intact L3-S1  Motor intact L3-S1  Drains in place with ss op    Lab Results: I have reviewed the following results:  Recent Labs     01/13/25  0509 01/14/25  0525 01/15/25  0511   WBC 5.51 6.58  "16.76*   HGB 8.1* 9.0* 9.0*   HCT 26.7* 31.0* 29.6*   * 465* 503*   BUN 12 9 9   CREATININE 0.48* 0.51* 0.52*     Blood Culture:    Lab Results   Component Value Date    BLOODCX No Growth After 5 Days. 01/03/2025    BLOODCX No Growth After 5 Days. 01/03/2025     Wound Culture: No results found for: \"WOUNDCULT\"  "

## 2025-01-15 NOTE — ASSESSMENT & PLAN NOTE
History of severe MDD and AMAURY with panic attacks  Continue PTA medications with Abilify, Zoloft, BuSpar, Minipress, trazodone and Atarax when able to take po

## 2025-01-15 NOTE — PROGRESS NOTES
Progress Note - Acute Pain   Name: Samantha Rodriguez 59 y.o. female I MRN: [id number]  Unit/Bed#: [facility] 615-01 I Date of Admission: 1/9/2025   Date of Service: 1/15/2025 I Hospital Day: 6    Assessment & Plan  Chronic pain disorder  Patient with hx of chronic pain  Multiple short prescriptions of opioids over the past few months    Outpatient regimen includes:  MS IR 15 mg tablets twice daily as needed (recently filled 12/30/2024 #30tabs/15days)  Diclofenac sodium 2 g gel 4 times daily as needed  Cyclobenzaprine 10 mg 3 times daily as needed  Lyrica 150 mg 3 times daily    PDMP review:  Filled  Written  Drug  QTY  Days  Prescriber  Refill  Daily Dose*      12/30/2024 12/30/2024 Pregabalin 150 Mg Capsule 45.00 15 Mi Rum 0 3.01 LME PA   12/30/2024 12/30/2024 Morphine Sulfate Ir 15 Mg Tab 30.00 15 Mi Rum 0 30.00 MME PA   12/03/2024 12/02/2024 Morphine Sulfate Ir 15 Mg Tab 60.00 30 Ch shelter 0 30.00 MME PA   12/03/2024 12/02/2024 Pregabalin 150 Mg Capsule 90.00 30 Ch shelter 0 3.01 LME PA   11/13/2024 10/22/2024 Morphine Sulfate Ir 15 Mg Tab 24.00 4 Mi Rum 0 90.00 MME PA   11/08/2024 11/08/2024 Pregabalin 150 Mg Capsule 90.00 30 Mi Rum 0 3.01 LME PA   11/03/2024 09/28/2024 Pregabalin 150 Mg Capsule 6.00 2 Mi Rum 0 3.01 LME PA   11/03/2024 10/22/2024 Morphine Sulfate Ir 15 Mg Tab 30.00 5 Mi Rum 0 90.00 MME PA   10/23/2024 10/22/2024 Morphine Sulfate Ir 15 Mg Tab 30.00 5 Mi Rum 0 90.00 MME PA   10/22/2024 10/10/2024 Morphine Sulfate Ir 15 Mg Tab 6.00 1 Mi Rum 0 90.00 MME PA   10/12/2024 09/28/2024 Pregabalin 150 Mg Capsule 42.00 14 Mi Rum 0 3.01 LME PA   10/10/2024 10/10/2024 Morphine Sulfate Ir 15 Mg Tab 24.00 4 Mi Rum 0 90.00 MME PA   09/28/2024 09/28/2024 Pregabalin 150 Mg Capsule 42.00 14 Mi Rum 0 3.01 LME PA   09/28/2024 09/28/2024 Morphine Sulfate Ir 15 Mg Tab 30.00 5 Mi Rum 0 90.00 MME PA   09/05/2024 09/05/2024 Pregabalin 100 Mg Capsule 90.00 30 Am Yes 0 2.01 LME PA     Ileus (HCC)  Patient with ileus vs SBO. S/p ex lap  on 1/14 for closed loop obstruction    NG tube in place this morning to suction 1/15  Class 2 obesity without serious comorbidity with body mass index (BMI) of 39.0 to 39.9 in adult    Wound infection complicating hardware (HCC)  Status post removal of hardware L2-S1, posterior fusion L5-S1, revision instrumentation L2-pelvis, bilateral S2 alar screws with orthopedic surgery on 12/19.  Presented with wound dehiscence and concern for abscess.  Now status post I&D lumbar spine 1/10/2025.  Small bowel obstruction due to adhesions (HCC)  S/p ex lap on 1/14 for ALLISON and closed loop obstruction    - S/p b/l rectus sheath truncal nerve blocks intra-op with exparel on 1/14  - Would not recommend epidural given recent concern for epidural abscess/wound complication lumbar spine    - NG tube in place this morning (POD 1) to suction  - Agree with dilaudid PCA given history of chronic opioid use. Rate set at 0.2mg, 6 min lockout, 2mg max 1 hr  - Initiated IV tylenol 1000mg q8hrs scheduled  - Initiated IV robaxin 1000mg q8hrs scheduled    - Narcan PRN opioid reversal  - OOB as tolerated, frequent IS use        APS will continue to follow. Please contact Acute Pain Service - via SecureChat from [phone] with additional questions or concerns. See [email or web address] or [email or web address] for additional contacts and after hours information.     Subjective   Samantha Rodriguez is a 59 y.o. female with history of recent lumbar surgery 12/19/2024 in setting of lumbar radiculopathy. Initially was hospitalized at the [facility] ambulatory dysfunction and RSV infection. Patient also found to have wound dehiscence and imaging suspicious for abscess. She underwent I&D of lumbar spine with orthopedics 1/10/2025 for which APS was consulted for assistance in postoperative pain management. Patient now s/p ex lap ALLISON on 1/14 for closed loop bowel obstruction with intra-op rectus sheath truncal nerve blocks. APS consulted for acute on chronic post operative  pain.    Patient was seen lying in her hospital bed, POD 1 ex lap late last evening. She has an NG tube in place to suction. She is reporting severe abdominal pain, somewhat relived by recent IV dilaudid doses. She is NPO. Her pain is located in her abdomen. She reports minimal relief from the nerve block but denies side effects of local anesthetic. She denies n/v, somnolence. Unable to assess constipation. She appears AAOx3. A dilaudid PCA was recently ordered. We discussed IV pain medications while her NG tube is in place and I explained the dilaudid PCA and it's use. All questions were answered.     Pain History  Current pain location(s):  Pain Score: 10  Pain Location/Orientation: Location: Abdomen  Pain Scale: Pain Assessment Tool: 0-10  24 hour history: POD 1 ex lap    Opioid requirement previous 24 hours: IV dilaudid 0.2mg x3 doses, 0.5mg x1 dose  Meds/Allergies   all current active meds have been reviewed  No Known Allergies  Objective :  Temp:  [98 °F (36.7 °C)-100.4 °F (38 °C)] 100.4 °F (38 °C)  HR:  [72-99] 80  BP: (132-151)/(71-93) 132/81  Resp:  [15-25] 20  SpO2:  [92 %-99 %] 98 %  O2 Device: None (Room air)    Physical Exam  Constitutional:       General: She is not in acute distress.     Appearance: Normal appearance.   HENT:      Nose:      Comments: NG Tube  Eyes:      Extraocular Movements: Extraocular movements intact.      Conjunctiva/sclera: Conjunctivae normal.   Cardiovascular:      Rate and Rhythm: Normal rate and regular rhythm.   Pulmonary:      Effort: Pulmonary effort is normal. No respiratory distress.      Breath sounds: Normal breath sounds.   Abdominal:      General: Abdomen is flat. There is no distension.      Palpations: Abdomen is soft.      Tenderness: There is abdominal tenderness.   Musculoskeletal:         General: Tenderness present. Normal range of motion.      Cervical back: Normal range of motion and neck supple.   Neurological:      General: No focal deficit present.       Mental Status: She is alert and oriented to person, place, and time.   Psychiatric:         Mood and Affect: Mood normal.         Behavior: Behavior normal.            Lab Results: I have reviewed the following results:  Estimated Creatinine Clearance: 117.3 mL/min (A) (by C-G formula based on SCr of 0.52 mg/dL (L)).  Lab Results   Component Value Date    WBC 16.76 (H) 01/15/2025    WBC 4.50 10/26/2015    HGB 9.0 (L) 01/15/2025    HGB 14.9 10/26/2015    HCT 29.6 (L) 01/15/2025    HCT 41.9 10/26/2015     (H) 01/15/2025     10/26/2015         Component Value Date/Time     07/27/2015 1206    K 4.0 01/15/2025 0511    K 3.2 (L) 10/16/2024 2317     01/15/2025 0511     10/16/2024 2317    CO2 28 01/15/2025 0511    CO2 25 12/19/2024 1155    CO2 27 10/16/2024 2317    BUN 9 01/15/2025 0511    BUN 7 10/16/2024 2317    CREATININE 0.52 (L) 01/15/2025 0511    CREATININE 0.65 10/16/2024 2317         Component Value Date/Time    CALCIUM 7.7 (L) 01/15/2025 0511    CALCIUM 9.6 10/16/2024 2317    ALKPHOS 69 12/24/2024 0448    ALKPHOS 110 10/16/2024 2317    AST 18 12/24/2024 0448    AST 19 10/16/2024 2317    ALT 13 12/24/2024 0448    ALT 18 10/16/2024 2317    BILITOT 0.51 02/22/2014 0615    TP 5.5 (L) 12/24/2024 0448    TP 7.7 10/16/2024 2317    ALB 3.3 (L) 12/24/2024 0448    ALB 4.6 10/16/2024 2317       Administrative Statements   I have spent a total time of 20 minutes in caring for this patient on the day of the visit/encounter including Diagnostic results, Risks and benefits of tx options, Patient and family education, Importance of tx compliance, Risk factor reductions, Documenting in the medical record, Reviewing / ordering tests, medicine, procedures  , Obtaining or reviewing history  , and Communicating with other healthcare professionals .

## 2025-01-15 NOTE — ASSESSMENT & PLAN NOTE
S/p ex lap on 1/14 for ALLISON and closed loop obstruction    - S/p b/l rectus sheath truncal nerve blocks intra-op with exparel on 1/14  - Would not recommend epidural given recent concern for epidural abscess/wound complication lumbar spine    - NG tube in place this morning (POD 1) to suction  - Agree with dilaudid PCA given history of chronic opioid use. Rate set at 0.2mg, 6 min lockout, 2mg max 1 hr  - Initiated IV tylenol 1000mg q8hrs scheduled  - Initiated IV robaxin 1000mg q8hrs scheduled    - Narcan PRN opioid reversal  - OOB as tolerated, frequent IS use

## 2025-01-15 NOTE — RESPIRATORY THERAPY NOTE
01/15/25 0900   Respiratory Assessment   Resp Comments Pt on PSN, Will d/c capnography   Oxygen Therapy/Pulse Ox   O2 Device None (Room air)   SpO2 98 %   SpO2 Activity At Rest   $ Pulse Oximetry Spot Check Charge Completed

## 2025-01-15 NOTE — PROGRESS NOTES
Progress Note - Surgery-General   Name: Samantha Rodriguez 59 y.o. female I MRN: [id number]  Unit/Bed#: [facility] 615-01 I Date of Admission: 1/9/2025   Date of Service: 1/15/2025 I Hospital Day: 6    Assessment & Plan  Small bowel obstruction due to adhesions (HCC)  S/p 1/14 ex lap, lysis of adhesion freeing up closed-loop obstruction.  Healthy bowel without any resection needed.  Primarily closed    Plan:  NG tube  PCA pump  Reach out to APS for pain management  Ileus (HCC)    Class 2 obesity without serious comorbidity with body mass index (BMI) of 39.0 to 39.9 in adult    Wound infection complicating hardware (HCC)          Subjective   In acute pain this morning, not controlled with oral and IV PRNs.  No nausea or vomiting    Objective :  Temp:  [98 °F (36.7 °C)-100.4 °F (38 °C)] 100.4 °F (38 °C)  HR:  [72-99] 80  BP: (132-151)/(71-93) 132/81  Resp:  [15-25] 20  SpO2:  [92 %-99 %] 98 %  O2 Device: None (Room air)    I/O         01/13 0701  01/14 0700 01/14 0701  01/15 0700 01/15 0701 01/16 0700    P.O. 360 0     I.V. (mL/kg)  2105 (22.1) 535 (5.6)    IV Piggyback 50      Total Intake(mL/kg) 410 (4.3) 2105 (22.1) 535 (5.6)    Urine (mL/kg/hr) 0 (0) 1425 (0.6)     Emesis/NG output  650     Drains 120 70     Blood  50     Total Output 120 2195     Net +290 -90 +535           Unmeasured Urine Occurrence 4 x 1 x           Lines/Drains/Airways       Active Status       Name Placement date Placement time Site Days    PICC Line 01/13/25 Left Basilic 01/13/25  1118  Basilic  1    Closed/Suction Drain Right Back Bulb 19 Fr. 01/10/25  1234  Back  4    Closed/Suction Drain Left Back Bulb 19 Fr. 01/10/25  1234  Back  4    NG/OG/Enteral Tube Nasogastric 18 Fr Right nare 01/14/25  1243  Right nare  less than 1    Urethral Catheter Latex 16 Fr. 01/14/25  2030  Latex  less than 1                  Physical Exam  Constitutional:       Appearance: Normal appearance.   HENT:      Head: Normocephalic and atraumatic.      Mouth/Throat:       Mouth: Mucous membranes are moist.   Eyes:      Extraocular Movements: Extraocular movements intact.   Cardiovascular:      Rate and Rhythm: Normal rate and regular rhythm.   Pulmonary:      Effort: Pulmonary effort is normal.   Abdominal:      General: Abdomen is flat. There is distension (mild).      Palpations: Abdomen is soft.      Tenderness: There is abdominal tenderness (moderate to severe to light touch). There is no guarding or rebound.   Musculoskeletal:         General: Normal range of motion.      Cervical back: Normal range of motion and neck supple.   Skin:     General: Skin is warm and dry.   Neurological:      General: No focal deficit present.      Mental Status: She is alert and oriented to person, place, and time.           Lab Results: I have reviewed the following results:  Recent Labs     01/14/25  1309 01/15/25  0511   WBC  --  16.76*   HGB  --  9.0*   HCT  --  29.6*   PLT  --  503*   SODIUM  --  140   K  --  4.0   CL  --  104   CO2  --  28   BUN  --  9   CREATININE  --  0.52*   GLUC  --  125   MG  --  1.5*   PHOS  --  3.1   LACTICACID 1.2  --

## 2025-01-15 NOTE — PHYSICAL THERAPY NOTE
PHYSICAL THERAPY RE-EVALUATION          Patient Name: Samantha Rodriguez  Today's Date: 1/15/2025       01/15/25 1145   Note Type   Note type Re-Evaluation   Pain Assessment   Pain Assessment Tool 0-10   Pain Score 8   Pain Location/Orientation Location: Abdomen   Pain Onset/Description Onset: Ongoing;Frequency: Constant/Continuous;Descriptor: Aching   Effect of Pain on Daily Activities increased pain with activity   Patient's Stated Pain Goal No pain   Hospital Pain Intervention(s) Ambulation/increased activity;Repositioned   Restrictions/Precautions   Weight Bearing Precautions Per Order No   Braces or Orthoses (S)  LSO   Other Precautions Cognitive;Chair Alarm;Bed Alarm;Multiple lines;Fall Risk;Pain;Spinal precautions  (NG tube, 2 SUSAN drains)   Home Living   Type of Home House   Home Layout Two level;Able to live on main level with bedroom/bathroom;Stairs to enter with rails   Home Equipment Walker   Additional Comments Please see IE for further information   Prior Function   Level of [place] Needs assistance with functional mobility   Lives With Daughter;Family   Receives Help From Family   Falls in the last 6 months 0   Comments Please see IE for further information   General   Family/Caregiver Present No   Cognition   Overall Cognitive Status WFL   Arousal/Participation Alert   Attention Within functional limits   Orientation Level Oriented X4   Memory Decreased recall of precautions;Decreased short term memory   Following Commands Follows one step commands without difficulty   RLE Assessment   RLE Assessment WFL   Strength RLE   RLE Overall Strength 3+/5   LLE Assessment   LLE Assessment X   Strength LLE   LLE Overall Strength 3+/5   Bed Mobility   Supine to Sit 4  Minimal assistance   Additional items Assist x 1;Increased time required;Verbal cues   Transfers   Sit to Stand 4  Minimal assistance   Additional items Assist x  1;Increased time required;Verbal cues   Stand to Sit 4  Minimal assistance   Additional items Assist x 1;Increased time required;Verbal cues   Ambulation/Elevation   Gait pattern Excessively slow;Short stride;Foward flexed;Inconsistent zack   Gait Assistance 4  Minimal assist   Additional items Assist x 1   Assistive Device Rolling walker   Distance 3ft  (limited by NG tube to wall suction)   Balance   Static Sitting Fair   Static Standing Fair -   Ambulatory Poor +   Endurance Deficit   Endurance Deficit Yes   Endurance Deficit Description fatigue, pain   Activity Tolerance   Activity Tolerance Patient limited by fatigue;Patient limited by pain   Medical Staff Made Aware Ward, OT; OT present for co re-evaluation due to pts current medical presentation   Nurse Made Aware Pt appropriate to be seen and mobilize per nsg   Assessment   Prognosis Good   Problem List Decreased strength;Decreased endurance;Impaired balance;Decreased mobility;Pain;Orthopedic restrictions   Assessment Pt originally presented to [facility] on 1/9/2025 and was admitted with a primary diagnosis of wound infection complicating hardware, and other active problems including small bowel obstruction due to adhesions, lumbar radiculopathy, chronic pain disorder, bipolar disorder, GERD without esophagitis, essential HTN, tardive dyskinesia, amatory dysfunction, history of CVA, anemia.  Patient had undergone I&D of lumbar spine with Ortho.  patient seen for PT reevaluation today due to patient undergoing ex lap, lysis of adhesion freeing up closed-loop obstruction which was performed on 1/14/2025.  PT reevaluation performed to assess patient's current functional mobility status and reestablish PT goals.  Patient was able to perform all bed mobility and transfers with min a x 1 which is slightly prepared to previous session.  Patient was able to tolerate ambulation with min a x 1, but distances remain limited by NG tube which is low wall suction at current  "time.  Overall patient continues to present with decreased bilateral lower extremity strength, decreased endurance, gait deviations, decreased static/dynamic balance, decreased activity tolerance, and will continue to benefit from skilled PT services during hospital stay.  At conclusion of PT reevaluation patient was assisted back into chair with all needs within reach.  D/C recommendation when medically cleared is level II resource intensity.   Goals   Patient Goals \"to get the NG tube out\"   STG Expiration Date 01/29/25   Short Term Goal #1 In 14 days pt will complete: 1) Bed mobility skills with mod I to increase safety and independence as well as decrease caregiver burden. 2) Functional transfers with mod I to promote increased independence, safety, and QOL. 3) Ambulate 200' using least restrictive AD with mod I without LOB and stable vitals so that pt can negotiate previous living environment safely and promote independence with functional mobility and return to PLOF. 4) Stair training up/ down 1 step/s using rail/s with mod I so that pt can enter/negotiate previous living environment safely and decrease fall risk. 5) Improve balance grades by 1/2 grade to increase safety with all mobility and decrease fall risk.  6) Improve BLE strength by 1/2 grade to help increase overall functional mobility and decrease fall risk.   Plan   Treatment/Interventions Functional transfer training;LE strengthening/ROM;Elevations;Therapeutic exercise;Endurance training;Patient/family training;Equipment eval/education;Bed mobility;Gait training;Spoke to nursing;OT   PT Frequency 3-5x/wk   Discharge Recommendation   Rehab Resource Intensity Level, PT II (Moderate Resource Intensity)   AM-PAC Basic Mobility Inpatient   Turning in Flat Bed Without Bedrails 3   Lying on Back to Sitting on Edge of Flat Bed Without Bedrails 3   Moving Bed to Chair 3   Standing Up From Chair Using Arms 3   Walk in Room 2   Climb 3-5 Stairs With Railing 2 "   Basic Mobility Inpatient Raw Score 16   Basic Mobility Standardized Score 38.32   [facility] Highest Level Of Mobility   -HL Goal 5: Stand one or more mins   -HLM Achieved 4: Move to chair/commode   Modified Willard Scale   Modified Larue Scale 4   Portions of the documentation may have been created using voice recognition software.Occasional wrong word or sound alike substitutions may have occurred due to the inherent limitations of the voice recognition software. Read the chart carefully and recognize, using context, where substitutions have occurred.    Cory Tony, PT, DPT

## 2025-01-15 NOTE — ANESTHESIA PROCEDURE NOTES
Peripheral Block    Patient location during procedure: pre-op  Reason for block: at surgeon's request and post-op pain management  Staffing  Performed by: Fausto Fountain MD  Authorized by: Fausto Fountain MD    Preanesthetic Checklist  Completed: patient identified, IV checked, site marked, risks and benefits discussed, surgical consent, monitors and equipment checked, pre-op evaluation and timeout performed  Peripheral Block  Patient position: sitting  Prep: ChloraPrep  Patient monitoring: heart rate, cardiac monitor, frequent blood pressure checks and continuous pulse oximetry  Block type: Rectus Sheath  Laterality: bilateral  Injection technique: single-shot  Procedures: ultrasound guided, Ultrasound guidance required for the procedure to increase accuracy and safety of medication placement and decrease risk of complications.  Ultrasound permanent image saved  bupivacaine (PF) (MARCAINE) 0.25 % injection 20 mL - Perineural   25 mL - 1/14/2025 10:12:00 PM  bupivacaine liposomal (EXPAREL) 1.3 % injection 20 mL - Perineural   10 mL - 1/14/2025 10:12:00 PM  Needle  Needle type: Stimuplex   Needle gauge: 22 G  Needle length: 4 in  Needle localization: ultrasound guidance and anatomical landmarks  Assessment  Injection assessment: incremental injection, local visualized surrounding nerve on ultrasound, negative aspiration for heme, no paresthesia on injection, negative aspiration, negative for heart rate change, injected with ease, no symptoms of intraneural/intravenous injection, frequent aspiration and needle tip visualized at all times  Paresthesia pain: none  Post-procedure:  site cleaned  patient tolerated the procedure well with no immediate complications  Additional Notes  Bilateral rectus sheath blocks

## 2025-01-15 NOTE — ANESTHESIA PROCEDURE NOTES
Peripheral Block    Patient location during procedure: pre-op  Start time: 1/14/2025 10:17 PM  Reason for block: at surgeon's request and post-op pain management  Staffing  Performed by: Fausto Fountain MD  Authorized by: Fausto Fountain MD    Preanesthetic Checklist  Completed: patient identified, IV checked, site marked, risks and benefits discussed, surgical consent, monitors and equipment checked, pre-op evaluation and timeout performed  Peripheral Block  Patient position: sitting  Prep: ChloraPrep  Patient monitoring: heart rate, cardiac monitor, frequent blood pressure checks and continuous pulse oximetry  Block type: TAP  Laterality: bilateral  Injection technique: single-shot  Procedures: ultrasound guided, Ultrasound guidance required for the procedure to increase accuracy and safety of medication placement and decrease risk of complications.  Ultrasound permanent image saved  bupivacaine (PF) (MARCAINE) 0.25 % injection 20 mL - Perineural   26 mL - 1/14/2025 10:17:00 PM  bupivacaine liposomal (EXPAREL) 1.3 % injection 20 mL - Perineural   10 mL - 1/14/2025 10:17:00 PM  Needle  Needle type: Stimuplex   Needle gauge: 22 G  Needle length: 4 in  Needle localization: ultrasound guidance and anatomical landmarks  Assessment  Injection assessment: incremental injection, local visualized surrounding nerve on ultrasound, negative aspiration for heme, no paresthesia on injection, negative aspiration, negative for heart rate change, injected with ease, no symptoms of intraneural/intravenous injection, frequent aspiration and needle tip visualized at all times  Paresthesia pain: none  Post-procedure:  site cleaned  patient tolerated the procedure well with no immediate complications  Additional Notes  Unremarkable bilateral lower quadrant TAP blocks

## 2025-01-15 NOTE — PLAN OF CARE
Problem: PAIN - ADULT  Goal: Verbalizes/displays adequate comfort level or baseline comfort level  Description: Interventions:  - Encourage patient to monitor pain and request assistance  - Assess pain using appropriate pain scale  - Administer analgesics based on type and severity of pain and evaluate response  - Implement non-pharmacological measures as appropriate and evaluate response  - Consider cultural and social influences on pain and pain management  - Notify physician/advanced practitioner if interventions unsuccessful or patient reports new pain  Outcome: Progressing     Problem: INFECTION - ADULT  Goal: Absence or prevention of progression during hospitalization  Description: INTERVENTIONS:  - Assess and monitor for signs and symptoms of infection  - Monitor lab/diagnostic results  - Monitor all insertion sites, i.e. indwelling lines, tubes, and drains  - Monitor endotracheal if appropriate and nasal secretions for changes in amount and color  - [place] appropriate cooling/warming therapies per order  - Administer medications as ordered  - Instruct and encourage patient and family to use good hand hygiene technique  - Identify and instruct in appropriate isolation precautions for identified infection/condition  Outcome: Progressing

## 2025-01-15 NOTE — ANESTHESIA POSTPROCEDURE EVALUATION
Post-Op Assessment Note    CV Status:  Stable  Pain Score: 3    Pain management: adequate       Mental Status:  Alert and awake   Hydration Status:  Euvolemic   PONV Controlled:  Controlled   Airway Patency:  Patent     Post Op Vitals Reviewed: Yes    No anethesia notable event occurred.    Staff: CRNA   Comments: Pt awake, alert, able to maintain own airway, VSS, report to recovery RN          Last Filed PACU Vitals:  Vitals Value Taken Time   Temp 98.1    Pulse 103    /77 01/14/25 2230   Resp 15 01/14/25 2230   SpO2 97 % 01/14/25 2230       Modified Gala:     Vitals Value Taken Time   Activity 2 01/14/25 2230   Respiration 2 01/14/25 2230   Circulation 2 01/14/25 2230   Consciousness 2 01/14/25 2230   Oxygen Saturation 2 01/14/25 2230     Modified Gala Score: 10

## 2025-01-16 LAB
ALBUMIN SERPL BCG-MCNC: 2.9 G/DL (ref 3.5–5)
ALP SERPL-CCNC: 91 U/L (ref 34–104)
ALT SERPL W P-5'-P-CCNC: 4 U/L (ref 7–52)
ANION GAP SERPL CALCULATED.3IONS-SCNC: 8 MMOL/L (ref 4–13)
AST SERPL W P-5'-P-CCNC: 11 U/L (ref 13–39)
BASOPHILS # BLD AUTO: 0.03 THOUSANDS/ΜL (ref 0–0.1)
BASOPHILS NFR BLD AUTO: 0 % (ref 0–1)
BILIRUB SERPL-MCNC: 0.55 MG/DL (ref 0.2–1)
BUN SERPL-MCNC: 6 MG/DL (ref 5–25)
CALCIUM ALBUM COR SERPL-MCNC: 8.6 MG/DL (ref 8.3–10.1)
CALCIUM SERPL-MCNC: 7.7 MG/DL (ref 8.4–10.2)
CHLORIDE SERPL-SCNC: 103 MMOL/L (ref 96–108)
CO2 SERPL-SCNC: 28 MMOL/L (ref 21–32)
CREAT SERPL-MCNC: 0.53 MG/DL (ref 0.6–1.3)
EOSINOPHIL # BLD AUTO: 0.41 THOUSAND/ΜL (ref 0–0.61)
EOSINOPHIL NFR BLD AUTO: 5 % (ref 0–6)
ERYTHROCYTE [DISTWIDTH] IN BLOOD BY AUTOMATED COUNT: 15.6 % (ref 11.6–15.1)
GFR SERPL CREATININE-BSD FRML MDRD: 102 ML/MIN/1.73SQ M
GLUCOSE SERPL-MCNC: 109 MG/DL (ref 65–140)
HCT VFR BLD AUTO: 26 % (ref 34.8–46.1)
HGB BLD-MCNC: 7.9 G/DL (ref 11.5–15.4)
IMM GRANULOCYTES # BLD AUTO: 0.06 THOUSAND/UL (ref 0–0.2)
IMM GRANULOCYTES NFR BLD AUTO: 1 % (ref 0–2)
LYMPHOCYTES # BLD AUTO: 1.23 THOUSANDS/ΜL (ref 0.6–4.47)
LYMPHOCYTES NFR BLD AUTO: 15 % (ref 14–44)
MAGNESIUM SERPL-MCNC: 1.9 MG/DL (ref 1.9–2.7)
MCH RBC QN AUTO: 27 PG (ref 26.8–34.3)
MCHC RBC AUTO-ENTMCNC: 30.4 G/DL (ref 31.4–37.4)
MCV RBC AUTO: 89 FL (ref 82–98)
MONOCYTES # BLD AUTO: 0.56 THOUSAND/ΜL (ref 0.17–1.22)
MONOCYTES NFR BLD AUTO: 7 % (ref 4–12)
NEUTROPHILS # BLD AUTO: 5.7 THOUSANDS/ΜL (ref 1.85–7.62)
NEUTS SEG NFR BLD AUTO: 72 % (ref 43–75)
NRBC BLD AUTO-RTO: 0 /100 WBCS
PHOSPHATE SERPL-MCNC: 3.3 MG/DL (ref 2.3–4.1)
PLATELET # BLD AUTO: 437 THOUSANDS/UL (ref 149–390)
PMV BLD AUTO: 9.2 FL (ref 8.9–12.7)
POTASSIUM SERPL-SCNC: 3.1 MMOL/L (ref 3.5–5.3)
PROT SERPL-MCNC: 5.6 G/DL (ref 6.4–8.4)
RBC # BLD AUTO: 2.93 MILLION/UL (ref 3.81–5.12)
SODIUM SERPL-SCNC: 139 MMOL/L (ref 135–147)
WBC # BLD AUTO: 7.99 THOUSAND/UL (ref 4.31–10.16)

## 2025-01-16 PROCEDURE — 99024 POSTOP FOLLOW-UP VISIT: CPT | Performed by: SURGERY

## 2025-01-16 PROCEDURE — 80053 COMPREHEN METABOLIC PANEL: CPT | Performed by: PHYSICIAN ASSISTANT

## 2025-01-16 PROCEDURE — 99232 SBSQ HOSP IP/OBS MODERATE 35: CPT | Performed by: INTERNAL MEDICINE

## 2025-01-16 PROCEDURE — 85025 COMPLETE CBC W/AUTO DIFF WBC: CPT | Performed by: NURSE PRACTITIONER

## 2025-01-16 PROCEDURE — 83735 ASSAY OF MAGNESIUM: CPT | Performed by: PHYSICIAN ASSISTANT

## 2025-01-16 PROCEDURE — NC001 PR NO CHARGE: Performed by: ORTHOPAEDIC SURGERY

## 2025-01-16 PROCEDURE — 84100 ASSAY OF PHOSPHORUS: CPT | Performed by: PHYSICIAN ASSISTANT

## 2025-01-16 PROCEDURE — 99233 SBSQ HOSP IP/OBS HIGH 50: CPT | Performed by: PHYSICIAN ASSISTANT

## 2025-01-16 RX ORDER — ACETAMINOPHEN 325 MG/1
975 TABLET ORAL EVERY 8 HOURS SCHEDULED
Status: DISCONTINUED | OUTPATIENT
Start: 2025-01-16 | End: 2025-01-22 | Stop reason: HOSPADM

## 2025-01-16 RX ORDER — HYDROMORPHONE HCL IN WATER/PF 6 MG/30 ML
0.2 PATIENT CONTROLLED ANALGESIA SYRINGE INTRAVENOUS EVERY 4 HOURS PRN
Status: DISPENSED | OUTPATIENT
Start: 2025-01-17 | End: 2025-01-18

## 2025-01-16 RX ORDER — OXYCODONE HYDROCHLORIDE 5 MG/1
5 TABLET ORAL EVERY 4 HOURS PRN
Refills: 0 | Status: DISCONTINUED | OUTPATIENT
Start: 2025-01-16 | End: 2025-01-22 | Stop reason: HOSPADM

## 2025-01-16 RX ORDER — METHOCARBAMOL 500 MG/1
500 TABLET, FILM COATED ORAL EVERY 6 HOURS SCHEDULED
Status: DISCONTINUED | OUTPATIENT
Start: 2025-01-16 | End: 2025-01-22 | Stop reason: HOSPADM

## 2025-01-16 RX ORDER — OXYCODONE HYDROCHLORIDE 10 MG/1
10 TABLET ORAL EVERY 4 HOURS PRN
Refills: 0 | Status: DISCONTINUED | OUTPATIENT
Start: 2025-01-16 | End: 2025-01-22 | Stop reason: HOSPADM

## 2025-01-16 RX ORDER — HYDROMORPHONE HCL IN WATER/PF 6 MG/30 ML
0.2 PATIENT CONTROLLED ANALGESIA SYRINGE INTRAVENOUS EVERY 2 HOUR PRN
Status: DISPENSED | OUTPATIENT
Start: 2025-01-16 | End: 2025-01-17

## 2025-01-16 RX ORDER — SODIUM CHLORIDE, SODIUM GLUCONATE, SODIUM ACETATE, POTASSIUM CHLORIDE, MAGNESIUM CHLORIDE, SODIUM PHOSPHATE, DIBASIC, AND POTASSIUM PHOSPHATE .53; .5; .37; .037; .03; .012; .00082 G/100ML; G/100ML; G/100ML; G/100ML; G/100ML; G/100ML; G/100ML
500 INJECTION, SOLUTION INTRAVENOUS ONCE
Status: COMPLETED | OUTPATIENT
Start: 2025-01-16 | End: 2025-01-17

## 2025-01-16 RX ORDER — POTASSIUM CHLORIDE 1500 MG/1
20 TABLET, EXTENDED RELEASE ORAL ONCE
Status: COMPLETED | OUTPATIENT
Start: 2025-01-16 | End: 2025-01-16

## 2025-01-16 RX ADMIN — SODIUM CHLORIDE, SODIUM GLUCONATE, SODIUM ACETATE, POTASSIUM CHLORIDE, MAGNESIUM CHLORIDE, SODIUM PHOSPHATE, DIBASIC, AND POTASSIUM PHOSPHATE 75 ML/HR: .53; .5; .37; .037; .03; .012; .00082 INJECTION, SOLUTION INTRAVENOUS at 09:09

## 2025-01-16 RX ADMIN — AMLODIPINE BESYLATE 5 MG: 5 TABLET ORAL at 08:53

## 2025-01-16 RX ADMIN — OXYCODONE HYDROCHLORIDE 10 MG: 10 TABLET ORAL at 21:12

## 2025-01-16 RX ADMIN — HYDROMORPHONE HYDROCHLORIDE 0.2 MG: 0.2 INJECTION, SOLUTION INTRAMUSCULAR; INTRAVENOUS; SUBCUTANEOUS at 23:47

## 2025-01-16 RX ADMIN — PRAZOSIN HYDROCHLORIDE 2 MG: 2 CAPSULE ORAL at 21:08

## 2025-01-16 RX ADMIN — PREGABALIN 150 MG: 75 CAPSULE ORAL at 15:43

## 2025-01-16 RX ADMIN — POTASSIUM CHLORIDE 20 MEQ: 1500 TABLET, EXTENDED RELEASE ORAL at 15:42

## 2025-01-16 RX ADMIN — BUSPIRONE HYDROCHLORIDE 15 MG: 10 TABLET ORAL at 15:43

## 2025-01-16 RX ADMIN — PREGABALIN 150 MG: 75 CAPSULE ORAL at 08:53

## 2025-01-16 RX ADMIN — BENZTROPINE MESYLATE 1 MG: 1 TABLET ORAL at 18:07

## 2025-01-16 RX ADMIN — HYDROXYZINE HYDROCHLORIDE 25 MG: 25 TABLET, FILM COATED ORAL at 15:42

## 2025-01-16 RX ADMIN — PREGABALIN 150 MG: 75 CAPSULE ORAL at 21:05

## 2025-01-16 RX ADMIN — METHOCARBAMOL 1000 MG: 100 INJECTION INTRAMUSCULAR; INTRAVENOUS at 05:25

## 2025-01-16 RX ADMIN — ACETAMINOPHEN 975 MG: 325 TABLET, FILM COATED ORAL at 13:36

## 2025-01-16 RX ADMIN — METHOCARBAMOL 500 MG: 500 TABLET ORAL at 23:44

## 2025-01-16 RX ADMIN — ACETAMINOPHEN 975 MG: 325 TABLET, FILM COATED ORAL at 21:05

## 2025-01-16 RX ADMIN — METHOCARBAMOL 500 MG: 500 TABLET ORAL at 18:07

## 2025-01-16 RX ADMIN — PANTOPRAZOLE SODIUM 40 MG: 40 INJECTION, POWDER, FOR SOLUTION INTRAVENOUS at 21:05

## 2025-01-16 RX ADMIN — ASPIRIN 81 MG: 81 TABLET, COATED ORAL at 08:53

## 2025-01-16 RX ADMIN — CEFTRIAXONE SODIUM 2000 MG: 10 INJECTION, POWDER, FOR SOLUTION INTRAVENOUS at 10:22

## 2025-01-16 RX ADMIN — BUSPIRONE HYDROCHLORIDE 15 MG: 10 TABLET ORAL at 08:53

## 2025-01-16 RX ADMIN — PANTOPRAZOLE SODIUM 40 MG: 40 INJECTION, POWDER, FOR SOLUTION INTRAVENOUS at 08:54

## 2025-01-16 RX ADMIN — SERTRALINE HYDROCHLORIDE 200 MG: 100 TABLET ORAL at 21:05

## 2025-01-16 RX ADMIN — ARIPIPRAZOLE 10 MG: 10 TABLET ORAL at 08:53

## 2025-01-16 RX ADMIN — BUSPIRONE HYDROCHLORIDE 15 MG: 10 TABLET ORAL at 21:05

## 2025-01-16 RX ADMIN — ACETAMINOPHEN 1000 MG: 1000 INJECTION, SOLUTION INTRAVENOUS at 05:29

## 2025-01-16 RX ADMIN — BENZTROPINE MESYLATE 1 MG: 1 TABLET ORAL at 08:53

## 2025-01-16 RX ADMIN — SODIUM CHLORIDE, SODIUM GLUCONATE, SODIUM ACETATE, POTASSIUM CHLORIDE, MAGNESIUM CHLORIDE, SODIUM PHOSPHATE, DIBASIC, AND POTASSIUM PHOSPHATE 500 ML: .53; .5; .37; .037; .03; .012; .00082 INJECTION, SOLUTION INTRAVENOUS at 23:48

## 2025-01-16 RX ADMIN — OXYCODONE HYDROCHLORIDE 10 MG: 10 TABLET ORAL at 15:42

## 2025-01-16 RX ADMIN — METHOCARBAMOL 500 MG: 500 TABLET ORAL at 13:36

## 2025-01-16 RX ADMIN — HYDROXYZINE HYDROCHLORIDE 25 MG: 25 TABLET, FILM COATED ORAL at 21:05

## 2025-01-16 RX ADMIN — ENOXAPARIN SODIUM 40 MG: 40 INJECTION SUBCUTANEOUS at 08:54

## 2025-01-16 RX ADMIN — HYDROXYZINE HYDROCHLORIDE 25 MG: 25 TABLET, FILM COATED ORAL at 08:53

## 2025-01-16 RX ADMIN — ATORVASTATIN CALCIUM 40 MG: 40 TABLET, FILM COATED ORAL at 15:43

## 2025-01-16 NOTE — ASSESSMENT & PLAN NOTE
Noted to have worsening epigastric sharp pain that started 1/8 overnight with an episode of vomiting. Required brief NGT placement.  Patient's symptoms improved with conservative care, but then re-developed 1/13.  Imaging consistent with SBO  S/p ex lap with ALLISON of obstructive pelvic adhesions 1/14 - operative findings suggestive of adhesive distal obstruction and possible closed-loop obstruction  Transferred to to General surgery as primary postoperatively; SLIM will remain on as consult  NGT discontinued 1/16  CLD with IVF

## 2025-01-16 NOTE — ASSESSMENT & PLAN NOTE
Prior hemoglobin range of 12-13 in 2023 however most recently in the 9s since her surgery in December  Hemoglobin currently stable in the 9s  Monitor and transfuse as needed

## 2025-01-16 NOTE — PROGRESS NOTES
Patient was not personally seen or examined today. Ongoing management of SBO as per primary service, General Surgery. ID following and managing antibiotic. APS following and managing pain regimen. Continues on home dose psychiatric and blood pressure medications, no changes to medical management at this time. SLIM will continue to follow peripherally, please call with questions or concerns.  Assessment & Plan  Wound infection complicating hardware (HCC)  Initially presented to [facility] 1/2 with ambulatory dysfunction.  Recently discharged from rehab following recent back surgery in December 2024.  On admission to [facility], patient met criteria and was diagnosed with UTI and RSV.  Patient also noted to have small area of wound dehiscence at the distal aspect of the wound from recent surgical hardware removal.  CT was ordered which showed ill-defined fluid with pockets of soft tissue gas and surrounding inflammatory stranding in the midline lower back suspicious for an abscess therefore patient was transferred to [facility] for orthopedic evaluation.  Recent blood cultures 1/3 negative  S/P I&D lumbar spine 1/10 with ortho. Cloudy fluid noted superficially and fluid tracking deeper to the hardware.   Lumbar drains x 2 removed   Cultures growing proteus  ID following, plan to continue IV Ceftriaxone x 6 weeks postop through 2/21/2025  PICC line placed 1/13   Pain control per APS recommendations   PT/OT recommending rehab  Small bowel obstruction due to adhesions (HCC)  Noted to have worsening epigastric sharp pain that started 1/8 overnight with an episode of vomiting. Required brief NGT placement.  Patient's symptoms improved with conservative care, but then re-developed 1/13.  Imaging consistent with SBO  S/p ex lap with ALLISON of obstructive pelvic adhesions 1/14 - operative findings suggestive of adhesive distal obstruction and possible closed-loop obstruction  Transferred to to General surgery as primary  postoperatively; SLIM will remain on as consult  NGT discontinued 1/16  CLD with IVF  Essential hypertension  BP reviewed and acceptable  Continue home dose amlodipine 5 mg daily and prazosin 2 mg nightly  As needed IV Lopressor for BP spikes  Bipolar disorder (HCC)  History of severe MDD and AMAURY with panic attacks  Continue PTA medications with Abilify, Zoloft, BuSpar, Minipress, Atarax, trazodone   Anemia  Prior hemoglobin range of 12-13 in 2023 however most recently in the 9s since her surgery in December  Hemoglobin currently stable in the 9s  Monitor and transfuse as needed  Lumbar radiculopathy  S/p removal hardware L2-S1, posterior fusion L5-S1, revision instrumentation L2-pelvis, bilateral S2 alar screws with orthopedic surgery on 12/19. Now dehiscence and concern for abscess as above.  Status post I&D as above  Pain control as above  Chronic pain disorder  Multiple short courses of opioids noted in history  APS following   GERD without esophagitis  Currently on IV PPI twice daily  Tardive dyskinesia  Patient is on Ingrezza which is nonformulary, continue substitute with Cogentin 1 mg twice daily while inpatient  Outpatient follow-up with psychiatry and neurology  RSV bronchitis  Tested positive for RSV on 1/3/2025  Chest x-ray without acute findings  Completed 5 days of steroid therapy  Symptoms resolved   Ambulatory dysfunction  Recommended rehab as above   History of CVA (cerebrovascular accident)  Continue aspirin and statin  Class 2 obesity without serious comorbidity with body mass index (BMI) of 39.0 to 39.9 in adult  Weight loss/lifestyle modifications as outpatient   History of UTI  Treated with 3 days of IV Ancef for E. coli prior to transfer    Radha López PA-C, [facility]

## 2025-01-16 NOTE — PROGRESS NOTES
Progress Note - Orthopedics   Name: Samantha Rodriguez 59 y.o. female I MRN: [id number]  Unit/Bed#: [facility] 615-01 I Date of Admission: 1/9/2025   Date of Service: 1/16/2025 I Hospital Day: 7    Assessment & Plan  Lumbar radiculopathy  See above  Class 2 obesity without serious comorbidity with body mass index (BMI) of 39.0 to 39.9 in adult    Wound infection complicating hardware (HCC)  Samantha is a 58 y/o F presenting 4 weeks s/p  weeks s/p removal hardware L2-S1, posterior fusion L5-S1, revision instrumentation L2-pelvis, bilateral S2 alar screws with Dr. Bills and Dr. Stevens (DOS 12/19/24) with concern for wound dehiscence.      Now s/p I&D lumbar spine. Doing well. OR cultures growing proteus in both the deep and superficial cultures. ID changed abx to IV ceftriaxone pending suseptibilities. ID recommending 6wks IV abx. Pt taken for ex lap yesterday for obstruction, feels better this am. Both drains have been removed. Orthopedically stable for discharge.     WBAT all extremities  Regular diet  Continue abx per id  ID consulted, appreciate recs   Picc line placement and 6wks iv ceftriaxone  2x lumbar drains removed   Multimodal pain control  PT/OT        Subjective   59 y.o.female doing well. No acute events, no new complaints. Pain well controlled. Denies fevers, chills, CP, SOB, N/V, numbness or tingling. Patient reports no issues with urination or bowel movements.     Objective :  Temp:  [98.6 °F (37 °C)-100.4 °F (38 °C)] 98.9 °F (37.2 °C)  HR:  [65-87] 71  BP: (108-140)/(57-84) 109/57  Resp:  [16-20] 18  SpO2:  [96 %-100 %] 99 %  O2 Device: None (Room air)    Physical Exam  Musculoskeletal: bilateral lower  Skin intact . No erythema or ecchymosis.  Dressing c/d/i  TTP sona-incisional   Motor intact to +FHL/EHL, +ankle dorsi/plantar flexion  2+ DP pulse  Sensation intact L3-S1  Motor intact L3-S1        Lab Results: I have reviewed the following results:  Recent Labs     01/14/25  0525 01/15/25  0511   WBC 6.58  "16.76*   HGB 9.0* 9.0*   HCT 31.0* 29.6*   * 503*   BUN 9 9   CREATININE 0.51* 0.52*     Blood Culture:    Lab Results   Component Value Date    BLOODCX No Growth After 5 Days. 01/03/2025    BLOODCX No Growth After 5 Days. 01/03/2025     Wound Culture: No results found for: \"WOUNDCULT\"  "

## 2025-01-16 NOTE — ASSESSMENT & PLAN NOTE
BP reviewed and acceptable  Continue home dose amlodipine 5 mg daily and prazosin 2 mg nightly  As needed IV Lopressor for BP spikes

## 2025-01-16 NOTE — PLAN OF CARE
Problem: PAIN - ADULT  Goal: Verbalizes/displays adequate comfort level or baseline comfort level  Description: Interventions:  - Encourage patient to monitor pain and request assistance  - Assess pain using appropriate pain scale  - Administer analgesics based on type and severity of pain and evaluate response  - Implement non-pharmacological measures as appropriate and evaluate response  - Consider cultural and social influences on pain and pain management  - Notify physician/advanced practitioner if interventions unsuccessful or patient reports new pain  Outcome: Progressing     Problem: INFECTION - ADULT  Goal: Absence or prevention of progression during hospitalization  Description: INTERVENTIONS:  - Assess and monitor for signs and symptoms of infection  - Monitor lab/diagnostic results  - Monitor all insertion sites, i.e. indwelling lines, tubes, and drains  - Monitor endotracheal if appropriate and nasal secretions for changes in amount and color  - [place] appropriate cooling/warming therapies per order  - Administer medications as ordered  - Instruct and encourage patient and family to use good hand hygiene technique  - Identify and instruct in appropriate isolation precautions for identified infection/condition  Outcome: Progressing     Problem: SAFETY ADULT  Goal: Maintain or return to baseline ADL function  Description: INTERVENTIONS:  -  Assess patient's ability to carry out ADLs; assess patient's baseline for ADL function and identify physical deficits which impact ability to perform ADLs (bathing, care of mouth/teeth, toileting, grooming, dressing, etc.)  - Assess/evaluate cause of self-care deficits   - Assess range of motion  - Assess patient's mobility; develop plan if impaired  - Assess patient's need for assistive devices and provide as appropriate  - Encourage maximum independence but intervene and supervise when necessary  - Involve family in performance of ADLs  - Assess for home care  needs following discharge   - Consider OT consult to assist with ADL evaluation and planning for discharge  - Provide patient education as appropriate  Outcome: Progressing     Problem: DISCHARGE PLANNING  Goal: Discharge to home or other facility with appropriate resources  Description: INTERVENTIONS:  - Identify barriers to discharge w/patient and caregiver  - Arrange for needed discharge resources and transportation as appropriate  - Identify discharge learning needs (meds, wound care, etc.)  - Arrange for interpretive services to assist at discharge as needed  - Refer to Case Management Department for coordinating discharge planning if the patient needs post-hospital services based on physician/advanced practitioner order or complex needs related to functional status, cognitive ability, or social support system  Outcome: Progressing     Problem: Knowledge Deficit  Goal: Patient/family/caregiver demonstrates understanding of disease process, treatment plan, medications, and discharge instructions  Description: Complete learning assessment and assess knowledge base.  Interventions:  - Provide teaching at level of understanding  - Provide teaching via preferred learning methods  Outcome: Progressing     Problem: GASTROINTESTINAL - ADULT  Goal: Minimal or absence of nausea and/or vomiting  Description: INTERVENTIONS:  - Administer IV fluids if ordered to ensure adequate hydration  - Maintain NPO status until nausea and vomiting are resolved  - Nasogastric tube if ordered  - Administer ordered antiemetic medications as needed  - Provide nonpharmacologic comfort measures as appropriate  - Advance diet as tolerated, if ordered  - Consider nutrition services referral to assist patient with adequate nutrition and appropriate food choices  Outcome: Progressing  Goal: Maintains or returns to baseline bowel function  Description: INTERVENTIONS:  - Assess bowel function  - Encourage oral fluids to ensure adequate  hydration  - Administer IV fluids if ordered to ensure adequate hydration  - Administer ordered medications as needed  - Encourage mobilization and activity  - Consider nutritional services referral to assist patient with adequate nutrition and appropriate food choices  Outcome: Progressing     Problem: GENITOURINARY - ADULT  Goal: Maintains or returns to baseline urinary function  Description: INTERVENTIONS:  - Assess urinary function  - Encourage oral fluids to ensure adequate hydration if ordered  - Administer IV fluids as ordered to ensure adequate hydration  - Administer ordered medications as needed  - Offer frequent toileting  - Follow urinary retention protocol if ordered  Outcome: Progressing     Problem: SKIN/TISSUE INTEGRITY - ADULT  Goal: Incision(s), wounds(s) or drain site(s) healing without S/S of infection  Description: INTERVENTIONS  - Assess and document dressing, incision, wound bed, drain sites and surrounding tissue  - Provide patient and family education  - Perform skin care/dressing changes every day  Outcome: Progressing

## 2025-01-16 NOTE — ASSESSMENT & PLAN NOTE
S/p ex lap, ALLISON of obstructive pelvic adhesions.  Operative findings suggestive of adhesive distal obstruction and possible closed-loop obstruction     Plan:  Discontinue NG tube (decreasing output, appetite, + gas, improving exam)  CLD with mIVF  Continue walking the halls  Continue PCA

## 2025-01-16 NOTE — ASSESSMENT & PLAN NOTE
Initially presented to [facility] 1/2 with ambulatory dysfunction.  Recently discharged from rehab following recent back surgery in December 2024.  On admission to [place], patient met criteria and was diagnosed with UTI and RSV.  Patient also noted to have small area of wound dehiscence at the distal aspect of the wound from recent surgical hardware removal.  CT was ordered which showed ill-defined fluid with pockets of soft tissue gas and surrounding inflammatory stranding in the midline lower back suspicious for an abscess therefore patient was transferred to [place] for orthopedic evaluation.  Recent blood cultures 1/3 negative  S/P I&D lumbar spine 1/10 with ortho. Cloudy fluid noted superficially and fluid tracking deeper to the hardware.   Lumbar drains x 2 removed   Cultures growing proteus  ID following, plan to continue IV Ceftriaxone x 6 weeks postop through 2/21/2025  PICC line placed 1/13   Pain control per APS recommendations   PT/OT recommending rehab

## 2025-01-16 NOTE — ASSESSMENT & PLAN NOTE
Change acetaminophen to 975 mg p.o. every 8 hours scheduled.  Lidocaine patch, on for 12 hours and off for 12 hours.  Change methocarbamol to 500 mg p.o. every 8 hours scheduled.  Continue pregabalin 150 mg p.o. 3 times daily.  Discontinue IV Dilaudid PCA.  Start oxycodone 5 mg p.o. every 4 hours as needed moderate pain or 10 mg p.o. every 4 hours as needed severe pain.  Start Dilaudid 0.2 mg IV every 2 hours as needed breakthrough pain x 24 hours, then every 4 hours as needed breakthrough pain x 24 hours, then discontinue.  Bowel regimen at discretion of primary service, suggested in the setting of opioid use to prevent opioid-induced constipation.  As needed Narcan in the event that opioid reversal becomes necessary.

## 2025-01-16 NOTE — PROGRESS NOTES
Progress Note - Acute Pain   Name: Samantha Rodriguez 59 y.o. female I MRN: [id number]  Unit/Bed#: [facility] 615-01 I Date of Admission: 1/9/2025   Date of Service: 1/16/2025 I Hospital Day: 7    Assessment & Plan  Chronic pain disorder  Patient with hx of chronic pain  Multiple short prescriptions of opioids over the past few months    Outpatient regimen includes:  MS IR 15 mg tablets twice daily as needed (recently filled 12/30/2024 #30tabs/15days)  Diclofenac sodium 2 g gel 4 times daily as needed  Cyclobenzaprine 10 mg 3 times daily as needed  Lyrica 150 mg 3 times daily    PDMP review:  Filled  Written  Drug  QTY  Days  Prescriber  Refill  Daily Dose*      12/30/2024 12/30/2024 Pregabalin 150 Mg Capsule 45.00 15 Mi Rum 0 3.01 LME PA   12/30/2024 12/30/2024 Morphine Sulfate Ir 15 Mg Tab 30.00 15 Mi Rum 0 30.00 MME PA   12/03/2024 12/02/2024 Morphine Sulfate Ir 15 Mg Tab 60.00 30 Ch long term 0 30.00 MME PA   12/03/2024 12/02/2024 Pregabalin 150 Mg Capsule 90.00 30 Ch long term 0 3.01 LME PA   11/13/2024 10/22/2024 Morphine Sulfate Ir 15 Mg Tab 24.00 4 Mi Rum 0 90.00 MME PA   11/08/2024 11/08/2024 Pregabalin 150 Mg Capsule 90.00 30 Mi Rum 0 3.01 LME PA   11/03/2024 09/28/2024 Pregabalin 150 Mg Capsule 6.00 2 Mi Rum 0 3.01 LME PA   11/03/2024 10/22/2024 Morphine Sulfate Ir 15 Mg Tab 30.00 5 Mi Rum 0 90.00 MME PA   10/23/2024 10/22/2024 Morphine Sulfate Ir 15 Mg Tab 30.00 5 Mi Rum 0 90.00 MME PA   10/22/2024 10/10/2024 Morphine Sulfate Ir 15 Mg Tab 6.00 1 Mi Rum 0 90.00 MME PA   10/12/2024 09/28/2024 Pregabalin 150 Mg Capsule 42.00 14 Mi Rum 0 3.01 LME PA   10/10/2024 10/10/2024 Morphine Sulfate Ir 15 Mg Tab 24.00 4 Mi Rum 0 90.00 MME PA   09/28/2024 09/28/2024 Pregabalin 150 Mg Capsule 42.00 14 Mi Rum 0 3.01 LME PA   09/28/2024 09/28/2024 Morphine Sulfate Ir 15 Mg Tab 30.00 5 Mi Rum 0 90.00 MME PA   09/05/2024 09/05/2024 Pregabalin 100 Mg Capsule 90.00 30 Am Yes 0 2.01 LME PA     Small bowel obstruction due to adhesions  (HCC)  Change acetaminophen to 975 mg p.o. every 8 hours scheduled.  Lidocaine patch, on for 12 hours and off for 12 hours.  Change methocarbamol to 500 mg p.o. every 8 hours scheduled.  Continue pregabalin 150 mg p.o. 3 times daily.  Discontinue IV Dilaudid PCA.  Start oxycodone 5 mg p.o. every 4 hours as needed moderate pain or 10 mg p.o. every 4 hours as needed severe pain.  Start Dilaudid 0.2 mg IV every 2 hours as needed breakthrough pain x 24 hours, then every 4 hours as needed breakthrough pain x 24 hours, then discontinue.  Bowel regimen at discretion of primary service, suggested in the setting of opioid use to prevent opioid-induced constipation.  As needed Narcan in the event that opioid reversal becomes necessary.  Wound infection complicating hardware (HCC)  Status post removal of hardware L2-S1, posterior fusion L5-S1, revision instrumentation L2-pelvis, bilateral S2 alar screws with orthopedic surgery on 12/19.  Presented with wound dehiscence and concern for abscess.  Now status post I&D lumbar spine 1/10/2025.    APS will sign off at this time. Thank you for the consult. All opioids and other analgesics to be written at discretion of primary team. Please contact Acute Pain Service - via SecureChat from [phone] with additional questions or concerns. See SecureMobile Patrol or Hipvan for additional contacts and after hours information.    Subjective   Samantha Rodriguez is a 59 y.o. female with history of recent lumbar surgery 12/19/2024 in setting of lumbar radiculopathy. Initially was hospitalized at the [facility] ambulatory dysfunction and RSV infection. Patient also found to have wound dehiscence and imaging suspicious for abscess. She underwent I&D of lumbar spine with orthopedics 1/10/2025 for which APS was consulted for assistance in postoperative pain management. Patient now s/p ex lap ALLISON on 1/14 for closed loop bowel obstruction with intra-op rectus sheath truncal nerve blocks. APS consulted  for acute on chronic post operative pain.       Pain History  Current pain location(s):  Pain Score: 6  Pain Location/Orientation: Location: Abdomen  Pain Scale: Pain Assessment Tool: 0-10  24 hour history: Patient's pain remains well-controlled.  Tolerating clear liquid diet without nausea or vomiting.    Opioid requirement previous 24 hours: Dilaudid 4.5 mg IV via PCA.  Meds/Allergies   all current active meds have been reviewed, current meds:   Current Facility-Administered Medications:     acetaminophen (TYLENOL) tablet 975 mg, Q8H KESHIA    albuterol (PROVENTIL HFA,VENTOLIN HFA) inhaler 2 puff, Q4H PRN    alteplase (CATHFLO) injection 2 mg, Once    aluminum-magnesium hydroxide-simethicone (MAALOX) oral suspension 30 mL, Q4H PRN    amLODIPine (NORVASC) tablet 5 mg, Daily    ARIPiprazole (ABILIFY) tablet 10 mg, Daily    aspirin (ECOTRIN LOW STRENGTH) EC tablet 81 mg, Daily    atorvastatin (LIPITOR) tablet 40 mg, Daily With Dinner    benztropine (COGENTIN) tablet 1 mg, BID    bupivacaine liposomal (EXPAREL) 1.3 % injection 20 mL, Once    busPIRone (BUSPAR) tablet 15 mg, TID    cefTRIAXone (ROCEPHIN) 2,000 mg in dextrose 5 % 50 mL IVPB, Q24H, Last Rate: Stopped (01/16/25 1115)    dextromethorphan-guaiFENesin (ROBITUSSIN DM) oral syrup 10 mL, Q4H PRN    enoxaparin (LOVENOX) subcutaneous injection 40 mg, Daily    HYDROmorphone HCl (DILAUDID) injection 0.2 mg, Q2H PRN **FOLLOWED BY** [START ON 1/17/2025] HYDROmorphone HCl (DILAUDID) injection 0.2 mg, Q4H PRN    hydrOXYzine HCL (ATARAX) tablet 25 mg, TID    lidocaine (LIDODERM) 5 % patch 1 patch, Daily    [Held by provider] lubiprostone (AMITIZA) capsule 8 mcg, BID    methocarbamol (ROBAXIN) tablet 500 mg, Q6H KESHIA    metoprolol (LOPRESSOR) injection 5 mg, Q6H PRN    multi-electrolyte (PLASMALYTE-A/ISOLYTE-S PH 7.4) IV solution, Continuous, Last Rate: 75 mL/hr (01/16/25 0909)    naloxone (NARCAN) 0.04 mg/mL syringe 0.04 mg, Q1MIN PRN    ondansetron (ZOFRAN) injection 4 mg,  Q6H PRN    oxyCODONE (ROXICODONE) IR tablet 5 mg, Q4H PRN **OR** oxyCODONE (ROXICODONE) immediate release tablet 10 mg, Q4H PRN    pantoprazole (PROTONIX) injection 40 mg, Q12H KESHIA    phenol (CHLORASEPTIC) 1.4 % mucosal liquid 1 spray, Q2H PRN    pneumococcal 20-cheryl conj vacc (PREVNAR 20) IM Injection 0.5 mL, Once PRN    prazosin (MINIPRESS) capsule 2 mg, HS    pregabalin (LYRICA) capsule 150 mg, TID    sertraline (ZOLOFT) tablet 200 mg, HS    traZODone (DESYREL) tablet 300 mg, HS PRN, and PTA meds:   Prior to Admission Medications   Prescriptions Last Dose Informant Patient Reported? Taking?   ARIPiprazole (ABILIFY) 10 mg tablet   No No   Sig: Take 1 tablet (10 mg total) by mouth daily   Cholecalciferol (VITAMIN D3) 1,000 units tablet   No No   Sig: Take 1 tablet (1,000 Units total) by mouth daily   Diclofenac Sodium (VOLTAREN) 1 %  Outside Facility (Specify) No No   Sig: Apply 2 g topically 4 (four) times a day as needed (pain)   Valbenazine Tosylate (Ingrezza) 60 MG CAPS   No No   Sig: Take 1 capsule by mouth in the morning   acetaminophen (TYLENOL) 325 mg tablet   No No   Sig: Take 2 tablets (650 mg total) by mouth every 4 (four) hours as needed for moderate pain   aluminum-magnesium hydroxide-simethicone (MAALOX) 0318-1280-511 mg/30 mL suspension   No No   Sig: Take 30 mL by mouth in the morning   amLODIPine (NORVASC) 5 mg tablet   No No   Sig: Take 1 tablet (5 mg total) by mouth daily   aspirin (Aspirin Low Dose) 81 mg EC tablet   No No   Sig: Take 1 tablet (81 mg total) by mouth daily   atorvastatin (LIPITOR) 40 mg tablet   No No   Sig: Take 1 tablet (40 mg total) by mouth daily with dinner   bisacodyl (DULCOLAX) 10 mg suppository   Yes No   Sig: Insert 10 mg into the rectum as needed for constipation As needed on day 5 of no bowel movement   busPIRone (BUSPAR) 15 mg tablet   No No   Sig: Take 1 tablet (15 mg total) by mouth 3 (three) times a day   cyclobenzaprine (FLEXERIL) 10 mg tablet   No No   Sig: Take 1  tablet (10 mg total) by mouth 3 (three) times a day as needed for muscle spasms   hydrOXYzine HCL (ATARAX) 25 mg tablet   No No   Sig: Take 1 tablet (25 mg total) by mouth 3 (three) times a day   loratadine (CLARITIN) 10 mg tablet   No No   Sig: Take 1 tablet (10 mg total) by mouth daily   lubiprostone (AMITIZA) 8 mcg capsule   No No   Sig: Take 1 capsule (8 mcg total) by mouth 2 (two) times a day   magnesium Oxide (MAG-OX) 400 mg TABS   No No   Sig: Take 1 tablet (400 mg total) by mouth daily   morphine (MSIR) 15 mg tablet   No No   Sig: Take 1 tablet (15 mg total) by mouth 2 (two) times a day Max Daily Amount: 30 mg   morphine (MSIR) 15 mg tablet   No No   Sig: Take 1 tablet (15 mg total) by mouth every 6 (six) hours as needed for severe pain (Please increase dose from 2x per day to 3x per day.) for up to 30 doses Max Daily Amount: 60 mg   naloxone (NARCAN) 4 mg/0.1 mL nasal spray   No No   Sig: Administer 1 spray into a nostril. If no response after 2-3 minutes, give another dose in the other nostril using a new spray.   ondansetron (ZOFRAN) 4 mg tablet   Yes No   pantoprazole (PROTONIX) 40 mg tablet   No No   Sig: Take 1 tablet (40 mg total) by mouth daily in the early morning   prazosin (MINIPRESS) 2 mg capsule   No No   Sig: Take 1 capsule (2 mg total) by mouth daily at bedtime   pregabalin (LYRICA) 150 mg capsule   No No   Sig: Take 1 capsule (150 mg total) by mouth 3 (three) times a day   senna-docusate sodium (SENOKOT S) 8.6-50 mg per tablet   No No   Sig: Take 1 tablet by mouth daily as needed for constipation   sertraline (ZOLOFT) 100 mg tablet   No No   Sig: Take 2 tablets (200 mg total) by mouth daily at bedtime   sodium phosphate (PEDIA-LAX) 3.5-9.5 g 59 mL enema   Yes No   Sig: Insert 1 enema into the rectum once   tirzepatide (Zepbound) 2.5 mg/0.5 mL auto-injector   Yes No   Sig: Inject 2.5 mg under the skin Once a week   traZODone (DESYREL) 300 MG tablet   No No   Sig: Take 1 tablet (300 mg total)  by mouth daily at bedtime      Facility-Administered Medications: None     No Known Allergies  Objective :  Temp:  [97.9 °F (36.6 °C)-99.1 °F (37.3 °C)] 97.9 °F (36.6 °C)  HR:  [59-87] 61  BP: (107-140)/(57-84) 125/71  Resp:  [16-18] 18  SpO2:  [96 %-100 %] 99 %  O2 Device: None (Room air)    Physical Exam  Vitals and nursing note reviewed.   Constitutional:       General: She is awake. She is not in acute distress.     Appearance: She is not ill-appearing, toxic-appearing or diaphoretic.      Comments: Sitting up in chair.  Appears comfortable.   Skin:     General: Skin is warm and dry.   Neurological:      Mental Status: She is alert and oriented to person, place, and time.      GCS: GCS eye subscore is 4. GCS verbal subscore is 5. GCS motor subscore is 6.   Psychiatric:         Attention and Perception: Attention normal.         Speech: Speech normal.         Behavior: Behavior normal. Behavior is cooperative.            Lab Results: I have reviewed the following results:  Estimated Creatinine Clearance: 116.7 mL/min (A) (by C-G formula based on SCr of 0.53 mg/dL (L)).  Lab Results   Component Value Date    WBC 7.99 01/16/2025    WBC 4.50 10/26/2015    HGB 7.9 (L) 01/16/2025    HGB 14.9 10/26/2015    HCT 26.0 (L) 01/16/2025    HCT 41.9 10/26/2015     (H) 01/16/2025     10/26/2015         Component Value Date/Time     07/27/2015 1206    K 3.1 (L) 01/16/2025 1018    K 3.2 (L) 10/16/2024 2317     01/16/2025 1018     10/16/2024 2317    CO2 28 01/16/2025 1018    CO2 25 12/19/2024 1155    CO2 27 10/16/2024 2317    BUN 6 01/16/2025 1018    BUN 7 10/16/2024 2317    CREATININE 0.53 (L) 01/16/2025 1018    CREATININE 0.65 10/16/2024 2317         Component Value Date/Time    CALCIUM 7.7 (L) 01/16/2025 1018    CALCIUM 9.6 10/16/2024 2317    ALKPHOS 91 01/16/2025 1018    ALKPHOS 110 10/16/2024 2317    AST 11 (L) 01/16/2025 1018    AST 19 10/16/2024 2317    ALT 4 (L) 01/16/2025 1018    ALT 18  10/16/2024 2317    BILITOT 0.51 02/22/2014 0615    TP 5.6 (L) 01/16/2025 1018    TP 7.7 10/16/2024 2317    ALB 2.9 (L) 01/16/2025 1018    ALB 4.6 10/16/2024 2317       Imaging Results Review: No pertinent imaging studies reviewed.  Other Study Results Review: No additional pertinent studies reviewed.     Administrative Statements   I have spent a total time of 31 minutes in caring for this patient on the day of the visit/encounter including Risks and benefits of tx options, Instructions for management, Patient and family education, Importance of tx compliance, Risk factor reductions, Impressions, Counseling / Coordination of care, Documenting in the medical record, Reviewing / ordering tests, medicine, procedures  , Obtaining or reviewing history  , and Communicating with other healthcare professionals .

## 2025-01-16 NOTE — ASSESSMENT & PLAN NOTE
Recurrent.  Initially managed with NGT.  Now status post XLap, ALLISON 1/14.     Postoperative care per Surgery

## 2025-01-16 NOTE — ASSESSMENT & PLAN NOTE
Patient with hx of chronic pain  Multiple short prescriptions of opioids over the past few months    Outpatient regimen includes:  MS IR 15 mg tablets twice daily as needed (recently filled 12/30/2024 #30tabs/15days)  Diclofenac sodium 2 g gel 4 times daily as needed  Cyclobenzaprine 10 mg 3 times daily as needed  Lyrica 150 mg 3 times daily    PDMP review:  Filled  Written  Drug  QTY  Days  Prescriber  Refill  Daily Dose*      12/30/2024 12/30/2024 Pregabalin 150 Mg Capsule 45.00 15 Mi Rum 0 3.01 LME PA   12/30/2024 12/30/2024 Morphine Sulfate Ir 15 Mg Tab 30.00 15 Mi Rum 0 30.00 MME PA   12/03/2024 12/02/2024 Morphine Sulfate Ir 15 Mg Tab 60.00 30 Ch halfway 0 30.00 MME PA   12/03/2024 12/02/2024 Pregabalin 150 Mg Capsule 90.00 30 Ch halfway 0 3.01 LME PA   11/13/2024 10/22/2024 Morphine Sulfate Ir 15 Mg Tab 24.00 4 Mi Rum 0 90.00 MME PA   11/08/2024 11/08/2024 Pregabalin 150 Mg Capsule 90.00 30 Mi Rum 0 3.01 LME PA   11/03/2024 09/28/2024 Pregabalin 150 Mg Capsule 6.00 2 Mi Rum 0 3.01 LME PA   11/03/2024 10/22/2024 Morphine Sulfate Ir 15 Mg Tab 30.00 5 Mi Rum 0 90.00 MME PA   10/23/2024 10/22/2024 Morphine Sulfate Ir 15 Mg Tab 30.00 5 Mi Rum 0 90.00 MME PA   10/22/2024 10/10/2024 Morphine Sulfate Ir 15 Mg Tab 6.00 1 Mi Rum 0 90.00 MME PA   10/12/2024 09/28/2024 Pregabalin 150 Mg Capsule 42.00 14 Mi Rum 0 3.01 LME PA   10/10/2024 10/10/2024 Morphine Sulfate Ir 15 Mg Tab 24.00 4 Mi Rum 0 90.00 MME PA   09/28/2024 09/28/2024 Pregabalin 150 Mg Capsule 42.00 14 Mi Rum 0 3.01 LME PA   09/28/2024 09/28/2024 Morphine Sulfate Ir 15 Mg Tab 30.00 5 Mi Rum 0 90.00 MME PA   09/05/2024 09/05/2024 Pregabalin 100 Mg Capsule 90.00 30 Am Yes 0 2.01 LME PA

## 2025-01-16 NOTE — ASSESSMENT & PLAN NOTE
Tested positive for RSV on 1/3/2025  Chest x-ray without acute findings  Completed 5 days of steroid therapy  Symptoms resolved

## 2025-01-16 NOTE — PROGRESS NOTES
Progress Note - Infectious Disease   Name: Samantha Rodriguez 59 y.o. female I MRN: [id number]  Unit[facility]Bed#: [facility] 615-01 I Date of Admission: 1/9/2025   Date of Service: 1/16/2025 I Hospital Day: 7    Assessment & Plan  Wound infection complicating hardware (HCC)  Presenting with delayed wound dehiscence in setting of recent fusion surgery as below.  Status post I&D 1/10.25.  Intraoperative findings notable for wound dehiscence to level of fascia which appeared intact.  Fascia was opened and deep space was explored.  Superficial and deep cultures grew Proteus.  Concern for HW involvement.    Continue ceftriaxone 2g IV Q24 through 2/21/25 to complete 6 weeks postop given concern for HW infection  Monitor closely while on antibiotics for toxicities including diarrhea, rash, cytopenias, or kidney injury  Check weekly CBC-diff, CMP while on IV antibiotics to monitor for toxicities.  D/C planning for STR  Outpatient follow-up with ID 2 weeks after D/C  D/C PICC after last dose of IV antibiotics  Given retained HW, will likely need suppressive antibiotics after IV course complete  Lumbar radiculopathy  Remote fusion L2-S1.  Underwent removal hardware L2-S1, posterior fusion L5-S1, revision instrumentation L2 to pelvis; application of bilateral S2 alar screws and bone grafting of the disc at L5-S1 12/19/24.  Complicated by infection as above.  RSV bronchitis  Patient tested positive on 1/3/25.  Mild URI symptoms, no O2 requirement.  Improved, off isolation.  Small bowel obstruction due to adhesions (HCC)  Recurrent.  Initially managed with NGT.  Now status post XLap, ALLISON 1/14.     Postoperative care per Surgery      The patient is stable from an ID standpoint.    Antibiotics:  Ceftriaxone  POD #5    Subjective   Patient has no fever, chills, sweats; no nausea, vomiting, diarrhea; no cough, shortness of breath; no pain. No new symptoms.  NGT removed this AM.    Objective :  Temp:  [97.9 °F (36.6 °C)-99.1 °F (37.3 °C)] 97.9 °F  (36.6 °C)  HR:  [59-87] 59  BP: (107-140)/(57-84) 125/71  Resp:  [16-18] 18  SpO2:  [96 %-100 %] 99 %  O2 Device: None (Room air)    General:  No acute distress  Psychiatric:  Awake and alert  Pulmonary:  Normal respiratory excursion without accessory muscle use  Abdomen:  Soft, nontender  Extremities:  No edema  Skin:  No rashes      Lab Results: I have reviewed the following results:  Results from last 7 days   Lab Units 01/15/25  0511 01/14/25  0525 01/13/25  0509   WBC Thousand/uL 16.76* 6.58 5.51   HEMOGLOBIN g/dL 9.0* 9.0* 8.1*   PLATELETS Thousands/uL 503* 465* 452*     Results from last 7 days   Lab Units 01/15/25  0511 01/14/25  0525 01/13/25  0509   SODIUM mmol/L 140 141 139   POTASSIUM mmol/L 4.0 3.7 3.8   CHLORIDE mmol/L 104 104 106   CO2 mmol/L 28 29 28   BUN mg/dL 9 9 12   CREATININE mg/dL 0.52* 0.51* 0.48*   EGFR ml/min/1.73sq m 103 104 106   CALCIUM mg/dL 7.7* 8.1* 7.4*     Results from last 7 days   Lab Units 01/10/25  1227 01/10/25  1220   GRAM STAIN RESULT  No Polys or Bacteria seen 3+ Polys  No organisms seen

## 2025-01-16 NOTE — ASSESSMENT & PLAN NOTE
Patient is on Ingrezza which is nonformulary, continue substitute with Cogentin 1 mg twice daily while inpatient  Outpatient follow-up with psychiatry and neurology

## 2025-01-16 NOTE — PROGRESS NOTES
Progress Note - Surgery-General   Name: Samantha Rodriguez 59 y.o. female I MRN: [id number]  Unit/Bed#: [facility] 615-01 I Date of Admission: 1/9/2025   Date of Service: 1/16/2025 I Hospital Day: 7    Assessment & Plan  Small bowel obstruction due to adhesions (HCC)  S/p ex lap, ALLISON of obstructive pelvic adhesions.  Operative findings suggestive of adhesive distal obstruction and possible closed-loop obstruction     Plan:  Discontinue NG tube (decreasing output, appetite, + gas, improving exam)  CLD with mIVF  Continue walking the halls  Continue PCA  Class 2 obesity without serious comorbidity with body mass index (BMI) of 39.0 to 39.9 in adult    Wound infection complicating hardware (HCC)          Subjective   Feels much improved since yesterday.  She is a little sore.  She is also hungry.  Passing gas and walking the halls    Objective :  Temp:  [98.6 °F (37 °C)-100.4 °F (38 °C)] 98.9 °F (37.2 °C)  HR:  [65-87] 71  BP: (108-140)/(57-84) 109/57  Resp:  [16-20] 18  SpO2:  [96 %-100 %] 99 %  O2 Device: None (Room air)    I/O         01/14 0701  01/15 0700 01/15 0701  01/16 0700    P.O. 0 0    I.V. (mL/kg) 2105 (22.1) 997.9 (10.2)    IV Piggyback  150    Total Intake(mL/kg) 2105 (22.1) 1147.9 (11.8)    Urine (mL/kg/hr) 1425 (0.6) 900 (0.4)    Emesis/NG output 650 300    Drains 70 13    Blood 50     Total Output 2195 1213    Net -90 -65.1          Unmeasured Urine Occurrence 1 x           Lines/Drains/Airways       Active Status       Name Placement date Placement time Site Days    PICC Line 01/13/25 Left Basilic 01/13/25  1118  Basilic  2    NG/OG/Enteral Tube Nasogastric 18 Fr Right nare 01/14/25  1243  Right nare  1                  Physical Exam  Constitutional:       Appearance: Normal appearance.   HENT:      Head: Normocephalic and atraumatic.      Mouth/Throat:      Mouth: Mucous membranes are moist.   Eyes:      Extraocular Movements: Extraocular movements intact.   Cardiovascular:      Rate and Rhythm: Normal rate  and regular rhythm.   Pulmonary:      Effort: Pulmonary effort is normal.   Abdominal:      General: Abdomen is flat. There is no distension.      Palpations: Abdomen is soft.      Tenderness: There is abdominal tenderness (mild appropriate). There is no guarding or rebound.   Musculoskeletal:         General: Normal range of motion.      Cervical back: Normal range of motion and neck supple.   Skin:     General: Skin is warm and dry.   Neurological:      General: No focal deficit present.      Mental Status: She is alert and oriented to person, place, and time.         Lab Results: I have reviewed the following results:  Recent Labs     01/14/25  1309 01/15/25  0511   WBC  --  16.76*   HGB  --  9.0*   HCT  --  29.6*   PLT  --  503*   SODIUM  --  140   K  --  4.0   CL  --  104   CO2  --  28   BUN  --  9   CREATININE  --  0.52*   GLUC  --  125   MG  --  1.5*   PHOS  --  3.1   LACTICACID 1.2  --

## 2025-01-16 NOTE — ASSESSMENT & PLAN NOTE
Samantha is a 60 y/o F presenting 4 weeks s/p  weeks s/p removal hardware L2-S1, posterior fusion L5-S1, revision instrumentation L2-pelvis, bilateral S2 alar screws with Dr. Bills and Dr. Stevens (DOS 12/19/24) with concern for wound dehiscence.      Now s/p I&D lumbar spine. Doing well. OR cultures growing proteus in both the deep and superficial cultures. ID changed abx to IV ceftriaxone pending suseptibilities. ID recommending 6wks IV abx. Pt taken for ex lap yesterday for obstruction, feels better this am. Both drains have been removed. Orthopedically stable for discharge.     WBAT all extremities  Regular diet  Continue abx per id  ID consulted, appreciate recs   Picc line placement and 6wks iv ceftriaxone  2x lumbar drains removed   Multimodal pain control  PT/OT

## 2025-01-16 NOTE — ASSESSMENT & PLAN NOTE
History of severe MDD and AMAURY with panic attacks  Continue PTA medications with Abilify, Zoloft, BuSpar, Minipress, Atarax, trazodone

## 2025-01-17 ENCOUNTER — APPOINTMENT (INPATIENT)
Dept: RADIOLOGY | Facility: HOSPITAL | Age: 60
DRG: 857 | End: 2025-01-17
Payer: MEDICARE

## 2025-01-17 LAB
ANION GAP SERPL CALCULATED.3IONS-SCNC: 4 MMOL/L (ref 4–13)
BASOPHILS # BLD AUTO: 0.03 THOUSANDS/ΜL (ref 0–0.1)
BASOPHILS NFR BLD AUTO: 1 % (ref 0–1)
BUN SERPL-MCNC: 5 MG/DL (ref 5–25)
CALCIUM SERPL-MCNC: 7.6 MG/DL (ref 8.4–10.2)
CHLORIDE SERPL-SCNC: 106 MMOL/L (ref 96–108)
CO2 SERPL-SCNC: 31 MMOL/L (ref 21–32)
CREAT SERPL-MCNC: 0.54 MG/DL (ref 0.6–1.3)
EOSINOPHIL # BLD AUTO: 0.38 THOUSAND/ΜL (ref 0–0.61)
EOSINOPHIL NFR BLD AUTO: 7 % (ref 0–6)
ERYTHROCYTE [DISTWIDTH] IN BLOOD BY AUTOMATED COUNT: 15.7 % (ref 11.6–15.1)
GFR SERPL CREATININE-BSD FRML MDRD: 102 ML/MIN/1.73SQ M
GLUCOSE SERPL-MCNC: 87 MG/DL (ref 65–140)
HCT VFR BLD AUTO: 24.8 % (ref 34.8–46.1)
HGB BLD-MCNC: 7.6 G/DL (ref 11.5–15.4)
IMM GRANULOCYTES # BLD AUTO: 0.03 THOUSAND/UL (ref 0–0.2)
IMM GRANULOCYTES NFR BLD AUTO: 1 % (ref 0–2)
LYMPHOCYTES # BLD AUTO: 1.17 THOUSANDS/ΜL (ref 0.6–4.47)
LYMPHOCYTES NFR BLD AUTO: 21 % (ref 14–44)
MCH RBC QN AUTO: 27.3 PG (ref 26.8–34.3)
MCHC RBC AUTO-ENTMCNC: 30.6 G/DL (ref 31.4–37.4)
MCV RBC AUTO: 89 FL (ref 82–98)
MONOCYTES # BLD AUTO: 0.58 THOUSAND/ΜL (ref 0.17–1.22)
MONOCYTES NFR BLD AUTO: 10 % (ref 4–12)
NEUTROPHILS # BLD AUTO: 3.42 THOUSANDS/ΜL (ref 1.85–7.62)
NEUTS SEG NFR BLD AUTO: 60 % (ref 43–75)
NRBC BLD AUTO-RTO: 0 /100 WBCS
PLATELET # BLD AUTO: 416 THOUSANDS/UL (ref 149–390)
PMV BLD AUTO: 9.5 FL (ref 8.9–12.7)
POTASSIUM SERPL-SCNC: 3.5 MMOL/L (ref 3.5–5.3)
RBC # BLD AUTO: 2.78 MILLION/UL (ref 3.81–5.12)
SODIUM SERPL-SCNC: 141 MMOL/L (ref 135–147)
WBC # BLD AUTO: 5.61 THOUSAND/UL (ref 4.31–10.16)

## 2025-01-17 PROCEDURE — 80048 BASIC METABOLIC PNL TOTAL CA: CPT

## 2025-01-17 PROCEDURE — 99232 SBSQ HOSP IP/OBS MODERATE 35: CPT | Performed by: PHYSICIAN ASSISTANT

## 2025-01-17 PROCEDURE — 99024 POSTOP FOLLOW-UP VISIT: CPT | Performed by: ORTHOPAEDIC SURGERY

## 2025-01-17 PROCEDURE — 99232 SBSQ HOSP IP/OBS MODERATE 35: CPT | Performed by: INTERNAL MEDICINE

## 2025-01-17 PROCEDURE — 74018 RADEX ABDOMEN 1 VIEW: CPT

## 2025-01-17 PROCEDURE — 97535 SELF CARE MNGMENT TRAINING: CPT

## 2025-01-17 PROCEDURE — 99024 POSTOP FOLLOW-UP VISIT: CPT | Performed by: SURGERY

## 2025-01-17 PROCEDURE — 85025 COMPLETE CBC W/AUTO DIFF WBC: CPT

## 2025-01-17 RX ORDER — AMOXICILLIN 250 MG
1 CAPSULE ORAL
Status: DISCONTINUED | OUTPATIENT
Start: 2025-01-17 | End: 2025-01-22 | Stop reason: HOSPADM

## 2025-01-17 RX ORDER — AMLODIPINE BESYLATE 2.5 MG/1
2.5 TABLET ORAL DAILY
Status: DISCONTINUED | OUTPATIENT
Start: 2025-01-18 | End: 2025-01-22 | Stop reason: HOSPADM

## 2025-01-17 RX ORDER — BISACODYL 10 MG
10 SUPPOSITORY, RECTAL RECTAL DAILY
Status: DISCONTINUED | OUTPATIENT
Start: 2025-01-17 | End: 2025-01-22 | Stop reason: HOSPADM

## 2025-01-17 RX ORDER — POTASSIUM CHLORIDE 29.8 MG/ML
40 INJECTION INTRAVENOUS ONCE
Status: COMPLETED | OUTPATIENT
Start: 2025-01-17 | End: 2025-01-17

## 2025-01-17 RX ORDER — POLYETHYLENE GLYCOL 3350 17 G/17G
17 POWDER, FOR SOLUTION ORAL DAILY
Status: DISCONTINUED | OUTPATIENT
Start: 2025-01-17 | End: 2025-01-17

## 2025-01-17 RX ORDER — PANTOPRAZOLE SODIUM 40 MG/1
40 TABLET, DELAYED RELEASE ORAL
Status: DISCONTINUED | OUTPATIENT
Start: 2025-01-17 | End: 2025-01-22 | Stop reason: HOSPADM

## 2025-01-17 RX ADMIN — MINERAL OIL 1 ENEMA: 100 ENEMA RECTAL at 16:33

## 2025-01-17 RX ADMIN — BUSPIRONE HYDROCHLORIDE 15 MG: 10 TABLET ORAL at 21:38

## 2025-01-17 RX ADMIN — METHOCARBAMOL 500 MG: 500 TABLET ORAL at 05:07

## 2025-01-17 RX ADMIN — ALUMINUM HYDROXIDE, MAGNESIUM HYDROXIDE, AND SIMETHICONE 30 ML: 200; 200; 20 SUSPENSION ORAL at 20:17

## 2025-01-17 RX ADMIN — HYDROXYZINE HYDROCHLORIDE 25 MG: 25 TABLET, FILM COATED ORAL at 21:38

## 2025-01-17 RX ADMIN — LIDOCAINE 1 PATCH: 700 PATCH TOPICAL at 21:43

## 2025-01-17 RX ADMIN — PANTOPRAZOLE SODIUM 40 MG: 40 TABLET, DELAYED RELEASE ORAL at 16:34

## 2025-01-17 RX ADMIN — BUSPIRONE HYDROCHLORIDE 15 MG: 10 TABLET ORAL at 09:12

## 2025-01-17 RX ADMIN — ONDANSETRON 4 MG: 2 INJECTION INTRAMUSCULAR; INTRAVENOUS at 21:55

## 2025-01-17 RX ADMIN — BUSPIRONE HYDROCHLORIDE 15 MG: 10 TABLET ORAL at 16:32

## 2025-01-17 RX ADMIN — BENZTROPINE MESYLATE 1 MG: 1 TABLET ORAL at 17:20

## 2025-01-17 RX ADMIN — ARIPIPRAZOLE 10 MG: 10 TABLET ORAL at 09:14

## 2025-01-17 RX ADMIN — POTASSIUM CHLORIDE 40 MEQ: 29.8 INJECTION, SOLUTION INTRAVENOUS at 16:35

## 2025-01-17 RX ADMIN — HYDROXYZINE HYDROCHLORIDE 25 MG: 25 TABLET, FILM COATED ORAL at 09:12

## 2025-01-17 RX ADMIN — ENOXAPARIN SODIUM 40 MG: 40 INJECTION SUBCUTANEOUS at 09:12

## 2025-01-17 RX ADMIN — HYDROXYZINE HYDROCHLORIDE 25 MG: 25 TABLET, FILM COATED ORAL at 16:32

## 2025-01-17 RX ADMIN — CEFTRIAXONE SODIUM 2000 MG: 10 INJECTION, POWDER, FOR SOLUTION INTRAVENOUS at 09:17

## 2025-01-17 RX ADMIN — OXYCODONE HYDROCHLORIDE 10 MG: 10 TABLET ORAL at 17:20

## 2025-01-17 RX ADMIN — PRAZOSIN HYDROCHLORIDE 2 MG: 2 CAPSULE ORAL at 21:38

## 2025-01-17 RX ADMIN — METHOCARBAMOL 500 MG: 500 TABLET ORAL at 17:20

## 2025-01-17 RX ADMIN — BENZTROPINE MESYLATE 1 MG: 1 TABLET ORAL at 09:12

## 2025-01-17 RX ADMIN — METHOCARBAMOL 500 MG: 500 TABLET ORAL at 11:36

## 2025-01-17 RX ADMIN — OXYCODONE HYDROCHLORIDE 10 MG: 10 TABLET ORAL at 09:12

## 2025-01-17 RX ADMIN — SENNOSIDES AND DOCUSATE SODIUM 1 TABLET: 50; 8.6 TABLET ORAL at 21:38

## 2025-01-17 RX ADMIN — ASPIRIN 81 MG: 81 TABLET, COATED ORAL at 09:12

## 2025-01-17 RX ADMIN — PREGABALIN 150 MG: 75 CAPSULE ORAL at 16:32

## 2025-01-17 RX ADMIN — SODIUM CHLORIDE, SODIUM GLUCONATE, SODIUM ACETATE, POTASSIUM CHLORIDE, MAGNESIUM CHLORIDE, SODIUM PHOSPHATE, DIBASIC, AND POTASSIUM PHOSPHATE 84 ML/HR: .53; .5; .37; .037; .03; .012; .00082 INJECTION, SOLUTION INTRAVENOUS at 20:12

## 2025-01-17 RX ADMIN — ATORVASTATIN CALCIUM 40 MG: 40 TABLET, FILM COATED ORAL at 16:32

## 2025-01-17 RX ADMIN — SODIUM CHLORIDE, SODIUM GLUCONATE, SODIUM ACETATE, POTASSIUM CHLORIDE, MAGNESIUM CHLORIDE, SODIUM PHOSPHATE, DIBASIC, AND POTASSIUM PHOSPHATE 100 ML/HR: .53; .5; .37; .037; .03; .012; .00082 INJECTION, SOLUTION INTRAVENOUS at 05:12

## 2025-01-17 RX ADMIN — PREGABALIN 150 MG: 75 CAPSULE ORAL at 21:38

## 2025-01-17 RX ADMIN — BISACODYL 10 MG: 10 SUPPOSITORY RECTAL at 16:33

## 2025-01-17 RX ADMIN — HYDROMORPHONE HYDROCHLORIDE 0.2 MG: 0.2 INJECTION, SOLUTION INTRAMUSCULAR; INTRAVENOUS; SUBCUTANEOUS at 11:37

## 2025-01-17 RX ADMIN — ACETAMINOPHEN 975 MG: 325 TABLET, FILM COATED ORAL at 21:38

## 2025-01-17 RX ADMIN — POLYETHYLENE GLYCOL 3350 17 G: 17 POWDER, FOR SOLUTION ORAL at 13:29

## 2025-01-17 RX ADMIN — PREGABALIN 150 MG: 75 CAPSULE ORAL at 09:12

## 2025-01-17 RX ADMIN — OXYCODONE HYDROCHLORIDE 10 MG: 10 TABLET ORAL at 05:07

## 2025-01-17 RX ADMIN — SERTRALINE HYDROCHLORIDE 200 MG: 100 TABLET ORAL at 21:38

## 2025-01-17 RX ADMIN — TRAZODONE HYDROCHLORIDE 300 MG: 100 TABLET ORAL at 21:45

## 2025-01-17 RX ADMIN — HYDROMORPHONE HYDROCHLORIDE 0.2 MG: 0.2 INJECTION, SOLUTION INTRAMUSCULAR; INTRAVENOUS; SUBCUTANEOUS at 20:07

## 2025-01-17 RX ADMIN — ACETAMINOPHEN 975 MG: 325 TABLET, FILM COATED ORAL at 05:07

## 2025-01-17 RX ADMIN — ONDANSETRON 4 MG: 2 INJECTION INTRAMUSCULAR; INTRAVENOUS at 13:29

## 2025-01-17 RX ADMIN — ACETAMINOPHEN 975 MG: 325 TABLET, FILM COATED ORAL at 13:28

## 2025-01-17 RX ADMIN — PANTOPRAZOLE SODIUM 40 MG: 40 TABLET, DELAYED RELEASE ORAL at 09:14

## 2025-01-17 NOTE — ASSESSMENT & PLAN NOTE
S/p ex lap, ALLISON of obstructive pelvic adhesions.  Operative findings suggestive of adhesive distal obstruction and possible closed-loop obstruction.  She says she feels more bloated today, with distention noted on exam    Plan:  Continue CLD with mIVF  Continue walking the halls  Continue PCA

## 2025-01-17 NOTE — PROGRESS NOTES
Progress Note - Surgery-General   Name: Samantha Rodriguez 59 y.o. female I MRN: [id number]  Unit/Bed#: [facility] 615-01 I Date of Admission: 1/9/2025   Date of Service: 1/17/2025 I Hospital Day: 8    Assessment & Plan  Small bowel obstruction due to adhesions (HCC)  S/p ex lap, ALLISON of obstructive pelvic adhesions.  Operative findings suggestive of adhesive distal obstruction and possible closed-loop obstruction.  She says she feels more bloated today, with distention noted on exam    Plan:  Continue CLD with mIVF  Continue walking the halls  Continue PCA  Class 2 obesity without serious comorbidity with body mass index (BMI) of 39.0 to 39.9 in adult    Wound infection complicating hardware (HCC)          Subjective   Feels sore and like her belly is swollen.  Tolerating clears and passing gas    Objective :  Temp:  [97.8 °F (36.6 °C)-98.4 °F (36.9 °C)] 98.1 °F (36.7 °C)  HR:  [59-72] 63  BP: ()/(45-71) 100/62  Resp:  [17-18] 18  SpO2:  [93 %-100 %] 97 %  O2 Device: None (Room air)    I/O         01/15 0701  01/16 0700 01/16 0701  01/17 0700 01/17 0701  01/18 0700    P.O. 0 920     I.V. (mL/kg) 2494.5 (25.6) 2477.6 (25.1)     IV Piggyback 250 50     Total Intake(mL/kg) 2744.5 (28.1) 3447.6 (34.9)     Urine (mL/kg/hr) 1150 (0.5) 1170 (0.5)     Emesis/NG output 450      Drains 13      Blood       Total Output 1613 1170     Net +1131.5 +2277.6            Unmeasured Urine Occurrence  3 x           Lines/Drains/Airways       Active Status       Name Placement date Placement time Site Days    PICC Line 01/13/25 Left Basilic 01/13/25  1118  Basilic  3                  Physical Exam  Constitutional:       Appearance: Normal appearance.   HENT:      Head: Normocephalic and atraumatic.      Mouth/Throat:      Mouth: Mucous membranes are moist.   Eyes:      Extraocular Movements: Extraocular movements intact.   Cardiovascular:      Rate and Rhythm: Normal rate and regular rhythm.   Pulmonary:      Effort: Pulmonary effort is  normal.   Abdominal:      General: Abdomen is flat. There is distension.      Palpations: Abdomen is soft.      Tenderness: There is abdominal tenderness. There is no guarding or rebound.   Musculoskeletal:         General: Normal range of motion.      Cervical back: Normal range of motion and neck supple.   Skin:     General: Skin is warm and dry.   Neurological:      General: No focal deficit present.      Mental Status: She is alert and oriented to person, place, and time.

## 2025-01-17 NOTE — ASSESSMENT & PLAN NOTE
SBP range soft ()  Reduce PTA amlodipine from 5 to 2.5 mg daily  C/w PTA prazosin 2 mg nightly  PRN IV Lopressor for BP spikes

## 2025-01-17 NOTE — PROGRESS NOTES
Progress Note - Infectious Disease   Name: Samantha Rodriguez 59 y.o. female I MRN: [id number]  Unit/Bed#: [facility] 615-01 I Date of Admission: 1/9/2025   Date of Service: 1/17/2025 I Hospital Day: 8    Assessment & Plan  Wound infection complicating hardware (HCC)  Presenting with delayed wound dehiscence in setting of recent fusion surgery as below.  Status post I&D 1/10/25.  Intraoperative findings notable for wound dehiscence to level of fascia which appeared intact.  Fascia was opened and deep space was explored.  Superficial and deep cultures grew Proteus.  Concern for HW involvement.    Continue ceftriaxone 2g IV Q24 through 2/21/25 to complete 6 weeks postop given concern for HW infection  Monitor closely while on antibiotics for toxicities including diarrhea, rash, cytopenias, or kidney injury  Check weekly CBC-diff, CMP while on IV antibiotics to monitor for toxicities.  D/C planning for STR  Outpatient follow-up with ID 2 weeks after D/C  D/C PICC after last dose of IV antibiotics  Given retained HW, will likely need suppressive antibiotics after IV course complete  Lumbar radiculopathy  Remote fusion L2-S1.  Underwent removal hardware L2-S1, posterior fusion L5-S1, revision instrumentation L2 to pelvis; application of bilateral S2 alar screws and bone grafting of the disc at L5-S1 12/19/24.  Complicated by infection as above.  RSV bronchitis  Patient tested positive on 1/3/25.  Mild URI symptoms, no O2 requirement.  Improved, off isolation.  Small bowel obstruction due to adhesions (HCC)  Recurrent.  Initially managed with NGT.  Now status post XLap, ALLISON 1/14.     Postoperative care per Surgery      The patient is stable from an ID standpoint.  ID will reassess the patient on Monday 1/20.  Please call in the interim with new questions.    Antibiotics:  Ceftriaxone  POD #6    Subjective   Patient sleeping soundly and not awakened.  No events noted.  Noted to be tolerating clears.    Objective :  Temp:  [97.8 °F  (36.6 °C)-98.4 °F (36.9 °C)] 97.9 °F (36.6 °C)  HR:  [59-72] 72  BP: ()/(45-71) 100/62  Resp:  [17-18] 18  SpO2:  [93 %-97 %] 97 %  O2 Device: None (Room air)    General:  No acute distress  Psychiatric:  Sleeping  Pulmonary:  Normal respiratory excursion without accessory muscle use  Abdomen:  Obese  Extremities:  No edema  Skin:  No rashes      Lab Results: I have reviewed the following results:  Results from last 7 days   Lab Units 01/17/25  0500 01/16/25  1018 01/15/25  0511   WBC Thousand/uL 5.61 7.99 16.76*   HEMOGLOBIN g/dL 7.6* 7.9* 9.0*   PLATELETS Thousands/uL 416* 437* 503*     Results from last 7 days   Lab Units 01/17/25  0500 01/16/25  1018 01/15/25  0511   SODIUM mmol/L 141 139 140   POTASSIUM mmol/L 3.5 3.1* 4.0   CHLORIDE mmol/L 106 103 104   CO2 mmol/L 31 28 28   BUN mg/dL 5 6 9   CREATININE mg/dL 0.54* 0.53* 0.52*   EGFR ml/min/1.73sq m 102 102 103   CALCIUM mg/dL 7.6* 7.7* 7.7*   AST U/L  --  11*  --    ALT U/L  --  4*  --    ALK PHOS U/L  --  91  --    ALBUMIN g/dL  --  2.9*  --      Results from last 7 days   Lab Units 01/10/25  1227 01/10/25  1220   GRAM STAIN RESULT  No Polys or Bacteria seen 3+ Polys  No organisms seen

## 2025-01-17 NOTE — ASSESSMENT & PLAN NOTE
Tested positive for RSV on 1/3/2025  Chest x-ray w/o acute findings  Completed 5 days of steroid therapy  No current respiratory symptoms

## 2025-01-17 NOTE — PLAN OF CARE
Problem: PAIN - ADULT  Goal: Verbalizes/displays adequate comfort level or baseline comfort level  Description: Interventions:  - Encourage patient to monitor pain and request assistance  - Assess pain using appropriate pain scale  - Administer analgesics based on type and severity of pain and evaluate response  - Implement non-pharmacological measures as appropriate and evaluate response  - Consider cultural and social influences on pain and pain management  - Notify physician/advanced practitioner if interventions unsuccessful or patient reports new pain  Outcome: Progressing     Problem: INFECTION - ADULT  Goal: Absence or prevention of progression during hospitalization  Description: INTERVENTIONS:  - Assess and monitor for signs and symptoms of infection  - Monitor lab/diagnostic results  - Monitor all insertion sites, i.e. indwelling lines, tubes, and drains  - Monitor endotracheal if appropriate and nasal secretions for changes in amount and color  - [place] appropriate cooling/warming therapies per order  - Administer medications as ordered  - Instruct and encourage patient and family to use good hand hygiene technique  - Identify and instruct in appropriate isolation precautions for identified infection/condition  Outcome: Progressing     Problem: SAFETY ADULT  Goal: Maintain or return to baseline ADL function  Description: INTERVENTIONS:  -  Assess patient's ability to carry out ADLs; assess patient's baseline for ADL function and identify physical deficits which impact ability to perform ADLs (bathing, care of mouth/teeth, toileting, grooming, dressing, etc.)  - Assess/evaluate cause of self-care deficits   - Assess range of motion  - Assess patient's mobility; develop plan if impaired  - Assess patient's need for assistive devices and provide as appropriate  - Encourage maximum independence but intervene and supervise when necessary  - Involve family in performance of ADLs  - Assess for home care  needs following discharge   - Consider OT consult to assist with ADL evaluation and planning for discharge  - Provide patient education as appropriate  Outcome: Progressing     Problem: DISCHARGE PLANNING  Goal: Discharge to home or other facility with appropriate resources  Description: INTERVENTIONS:  - Identify barriers to discharge w/patient and caregiver  - Arrange for needed discharge resources and transportation as appropriate  - Identify discharge learning needs (meds, wound care, etc.)  - Arrange for interpretive services to assist at discharge as needed  - Refer to Case Management Department for coordinating discharge planning if the patient needs post-hospital services based on physician/advanced practitioner order or complex needs related to functional status, cognitive ability, or social support system  Outcome: Progressing     Problem: Knowledge Deficit  Goal: Patient/family/caregiver demonstrates understanding of disease process, treatment plan, medications, and discharge instructions  Description: Complete learning assessment and assess knowledge base.  Interventions:  - Provide teaching at level of understanding  - Provide teaching via preferred learning methods  Outcome: Progressing     Problem: GASTROINTESTINAL - ADULT  Goal: Minimal or absence of nausea and/or vomiting  Description: INTERVENTIONS:  - Administer IV fluids if ordered to ensure adequate hydration  - Maintain NPO status until nausea and vomiting are resolved  - Nasogastric tube if ordered  - Administer ordered antiemetic medications as needed  - Provide nonpharmacologic comfort measures as appropriate  - Advance diet as tolerated, if ordered  - Consider nutrition services referral to assist patient with adequate nutrition and appropriate food choices  Outcome: Progressing  Goal: Maintains or returns to baseline bowel function  Description: INTERVENTIONS:  - Assess bowel function  - Encourage oral fluids to ensure adequate  hydration  - Administer IV fluids if ordered to ensure adequate hydration  - Administer ordered medications as needed  - Encourage mobilization and activity  - Consider nutritional services referral to assist patient with adequate nutrition and appropriate food choices  Outcome: Progressing     Problem: GENITOURINARY - ADULT  Goal: Maintains or returns to baseline urinary function  Description: INTERVENTIONS:  - Assess urinary function  - Encourage oral fluids to ensure adequate hydration if ordered  - Administer IV fluids as ordered to ensure adequate hydration  - Administer ordered medications as needed  - Offer frequent toileting  - Follow urinary retention protocol if ordered  Outcome: Progressing     Problem: SKIN/TISSUE INTEGRITY - ADULT  Goal: Incision(s), wounds(s) or drain site(s) healing without S/S of infection  Description: INTERVENTIONS  - Assess and document dressing, incision, wound bed, drain sites and surrounding tissue  - Provide patient and family education  - Perform skin care/dressing changes every  Outcome: Progressing     Problem: Prexisting or High Potential for Compromised Skin Integrity  Goal: Skin integrity is maintained or improved  Description: INTERVENTIONS:  - Identify patients at risk for skin breakdown  - Assess and monitor skin integrity  - Assess and monitor nutrition and hydration status  - Monitor labs   - Assess for incontinence   - Turn and reposition patient  - Assist with mobility/ambulation  - Relieve pressure over bony prominences  - Avoid friction and shearing  - Provide appropriate hygiene as needed including keeping skin clean and dry  - Evaluate need for skin moisturizer/barrier cream  - Collaborate with interdisciplinary team   - Patient/family teaching  - Consider wound care consult   Outcome: Progressing

## 2025-01-17 NOTE — ASSESSMENT & PLAN NOTE
Noted to have worsening epigastric sharp pain that started 01/8 overnight with an episode of vomiting. Required brief NGT placement.  Patient's symptoms improved with conservative care, but then re-developed 01/13.  Imaging consistent with SBO  S/p ex lap with ALLISON of obstructive pelvic adhesions 01/14 - operative findings suggestive of adhesive distal obstruction and possible closed-loop obstruction  Transferred to to General surgery as primary postoperatively; SLIM will remain on as consult  NGT discontinued 01/16  CLD with IVF

## 2025-01-17 NOTE — OCCUPATIONAL THERAPY NOTE
Occupational Therapy Treatment Note:      01/17/25 1505   OT Last Visit   OT Visit Date 01/17/25   Note Type   Note Type Treatment   Pain Assessment   Pain Assessment Tool 0-10   Pain Score 7  (in abd c activity)   Restrictions/Precautions   Braces or Orthoses LSO   Other Precautions Cognitive;Fall Risk   Lifestyle   Intrinsic Gratification when asked if she wanted word puzzles, coloring for adults or leisure tasks to pass time in hospital she reported I dont play games   ADL   Where Assessed Chair  (poor frustration tolerence)   Eating Assistance   (pt on clear liquids since ex lap procedure. she needs asst to open containers)   Grooming Assistance 5  Supervision/Setup   UB Dressing Assistance 4  Minimal Assistance   LB Dressing Assistance 2  Maximal Assistance   LB Dressing Deficit Use of adaptive equipment   Toileting Assistance  3  Moderate Assistance   Functional Standing Tolerance   Time pt stood for functional transfers requireing min asst c rw   Transfers   Sit to Stand 4  Minimal assistance   Additional items   (rw)   Stand to Sit 4  Minimal assistance   Functional Mobility   Functional Mobility 4  Minimal assistance   Additional items Rolling walker   Cognition   Arousal/Participation Alert   Attention Attends with cues to redirect   Memory Decreased recall of recent events   Following Commands Follows one step commands without difficulty   Assessment   Assessment pt participated in pm ot session and was seen focusing on lb adls while seted in chair using lhae. she has limited problem solving ability inorder to use dressing stick and reacher effectively thus required min asst to set device into sock top. pt did eventually doff socks. she did use reacher to  3 different items from floor with trials and error. she reports having a reacher. pt with little intrest this date in sock aide. did pull on sock r foot with mod asst. pt with some discomfort in abd area with  with ue pulling and use of  devices. pt was pleasant and cooperative. will follow focusing on goals from ie   Plan   Treatment Interventions ADL retraining;Functional transfer training;Endurance training;Patient/family training;Activityengagement   Goal Expiration Date 01/29/25   OT Treatment Day 2   OT Frequency 2-3x/wk   Discharge Recommendation   Rehab Resource Intensity Level, OT II (Moderate Resource Intensity)     April A Storm

## 2025-01-17 NOTE — PLAN OF CARE
Problem: PAIN - ADULT  Goal: Verbalizes/displays adequate comfort level or baseline comfort level  Description: Interventions:  - Encourage patient to monitor pain and request assistance  - Assess pain using appropriate pain scale  - Administer analgesics based on type and severity of pain and evaluate response  - Implement non-pharmacological measures as appropriate and evaluate response  - Consider cultural and social influences on pain and pain management  - Notify physician/advanced practitioner if interventions unsuccessful or patient reports new pain  Outcome: Progressing     Problem: INFECTION - ADULT  Goal: Absence or prevention of progression during hospitalization  Description: INTERVENTIONS:  - Assess and monitor for signs and symptoms of infection  - Monitor lab/diagnostic results  - Monitor all insertion sites, i.e. indwelling lines, tubes, and drains  - Monitor endotracheal if appropriate and nasal secretions for changes in amount and color  - [place] appropriate cooling/warming therapies per order  - Administer medications as ordered  - Instruct and encourage patient and family to use good hand hygiene technique  - Identify and instruct in appropriate isolation precautions for identified infection/condition  Outcome: Progressing     Problem: SAFETY ADULT  Goal: Maintain or return to baseline ADL function  Description: INTERVENTIONS:  -  Assess patient's ability to carry out ADLs; assess patient's baseline for ADL function and identify physical deficits which impact ability to perform ADLs (bathing, care of mouth/teeth, toileting, grooming, dressing, etc.)  - Assess/evaluate cause of self-care deficits   - Assess range of motion  - Assess patient's mobility; develop plan if impaired  - Assess patient's need for assistive devices and provide as appropriate  - Encourage maximum independence but intervene and supervise when necessary  - Involve family in performance of ADLs  - Assess for home care  needs following discharge   - Consider OT consult to assist with ADL evaluation and planning for discharge  - Provide patient education as appropriate  Outcome: Progressing     Problem: DISCHARGE PLANNING  Goal: Discharge to home or other facility with appropriate resources  Description: INTERVENTIONS:  - Identify barriers to discharge w/patient and caregiver  - Arrange for needed discharge resources and transportation as appropriate  - Identify discharge learning needs (meds, wound care, etc.)  - Arrange for interpretive services to assist at discharge as needed  - Refer to Case Management Department for coordinating discharge planning if the patient needs post-hospital services based on physician/advanced practitioner order or complex needs related to functional status, cognitive ability, or social support system  Outcome: Progressing     Problem: Knowledge Deficit  Goal: Patient/family/caregiver demonstrates understanding of disease process, treatment plan, medications, and discharge instructions  Description: Complete learning assessment and assess knowledge base.  Interventions:  - Provide teaching at level of understanding  - Provide teaching via preferred learning methods  Outcome: Progressing     Problem: GASTROINTESTINAL - ADULT  Goal: Minimal or absence of nausea and/or vomiting  Description: INTERVENTIONS:  - Administer IV fluids if ordered to ensure adequate hydration  - Maintain NPO status until nausea and vomiting are resolved  - Nasogastric tube if ordered  - Administer ordered antiemetic medications as needed  - Provide nonpharmacologic comfort measures as appropriate  - Advance diet as tolerated, if ordered  - Consider nutrition services referral to assist patient with adequate nutrition and appropriate food choices  Outcome: Progressing  Goal: Maintains or returns to baseline bowel function  Description: INTERVENTIONS:  - Assess bowel function  - Encourage oral fluids to ensure adequate  hydration  - Administer IV fluids if ordered to ensure adequate hydration  - Administer ordered medications as needed  - Encourage mobilization and activity  - Consider nutritional services referral to assist patient with adequate nutrition and appropriate food choices  Outcome: Progressing     Problem: GENITOURINARY - ADULT  Goal: Maintains or returns to baseline urinary function  Description: INTERVENTIONS:  - Assess urinary function  - Encourage oral fluids to ensure adequate hydration if ordered  - Administer IV fluids as ordered to ensure adequate hydration  - Administer ordered medications as needed  - Offer frequent toileting  - Follow urinary retention protocol if ordered  Outcome: Progressing     Problem: Prexisting or High Potential for Compromised Skin Integrity  Goal: Skin integrity is maintained or improved  Description: INTERVENTIONS:  - Identify patients at risk for skin breakdown  - Assess and monitor skin integrity  - Assess and monitor nutrition and hydration status  - Monitor labs   - Assess for incontinence   - Turn and reposition patient  - Assist with mobility/ambulation  - Relieve pressure over bony prominences  - Avoid friction and shearing  - Provide appropriate hygiene as needed including keeping skin clean and dry  - Evaluate need for skin moisturizer/barrier cream  - Collaborate with interdisciplinary team   - Patient/family teaching  - Consider wound care consult   Outcome: Progressing

## 2025-01-17 NOTE — ASSESSMENT & PLAN NOTE
History of severe MDD and AMAURY with panic attacks  C/w PTA medications with Abilify, Zoloft, BuSpar, Minipress, Atarax, trazodone

## 2025-01-17 NOTE — ASSESSMENT & PLAN NOTE
S/p removal hardware L2-S1, posterior fusion L5-S1, revision instrumentation L2-pelvis, bilateral S2 alar screws with orthopedic surgery on 12/19. Now dehiscence and concern for abscess as above.  S/p I&D as above  Pain control as above

## 2025-01-17 NOTE — PLAN OF CARE
Problem: OCCUPATIONAL THERAPY ADULT  Goal: Performs self-care activities at highest level of function for planned discharge setting.  See evaluation for individualized goals.  Description: Treatment Interventions: ADL retraining, Functional transfer training, Endurance training, Cognitive reorientation, Patient/family training, Equipment evaluation/education, Compensatory technique education, Energy conservation, Activityengagement          See flowsheet documentation for full assessment, interventions and recommendations.   1/17/2025 1527 by BRII Alvarez  Outcome: Progressing  Note: Limitation: Decreased ADL status, Decreased cognition, Decreased endurance, Decreased self-care trans, Decreased high-level ADLs  Prognosis: Good  Assessment: pt participated in pm ot session and was seen focusing on lb adls while seted in chair using lhae. she has limited problem solving ability inorder to use dressing stick and reacher effectively thus required min asst to set device into sock top. pt did eventually doff socks. she did use reacher to  3 different items from floor with trials and error. she reports having a reacher. pt with little intrest this date in sock aide. did pull on sock r foot with mod asst. pt with some discomfort in abd area with  with ue pulling and use of devices. pt was pleasant and cooperative. will follow focusing on goals from ie     Rehab Resource Intensity Level, OT: II (Moderate Resource Intensity)       1/17/2025 1526 by BRII Alvarez  Outcome: Progressing  Note: Limitation: Decreased ADL status, Decreased cognition, Decreased endurance, Decreased self-care trans, Decreased high-level ADLs  Prognosis: Good  Assessment: pt participated in pm ot session and was seen focusing on lb adls while seted in chair using lhae. she has limited problem solving ability inorder to use dressing stick and reacher effectively thus required min asst to set device into sock top. pt did eventually  doff socks. she did use reacher to  3 different items from floor with trials and error. she reports having a reacher. pt with little intrest this date in sock aide. did pull on sock r foot with mod asst. pt with some discomfort in abd area with  with ue pulling and use of devices. pt was pleasant and cooperative. will follow focusing on goals from ie     Rehab Resource Intensity Level, OT: II (Moderate Resource Intensity)        April A Storm

## 2025-01-17 NOTE — RESTORATIVE TECHNICIAN NOTE
Restorative Technician Note      Patient Name: Samantha Rodriguez     Restorative Tech Visit Date: 01/17/25  Note Type: Mobility  Patient Position Upon Consult: Bedside chair  Activity Performed: Ambulated  Assistive Device: Roller walker  Patient Position at End of Consult: Bedside chair; All needs within reach; Other (comment) (Left in the care of OT)    MONTSE Donohue

## 2025-01-17 NOTE — ASSESSMENT & PLAN NOTE
Samantha is a 58 y/o F presenting 4 weeks s/p  weeks s/p removal hardware L2-S1, posterior fusion L5-S1, revision instrumentation L2-pelvis, bilateral S2 alar screws with Dr. Bills and Dr. Stevens (DOS 12/19/24) with concern for wound dehiscence.      Now s/p I&D lumbar spine. Doing well. OR cultures growing proteus in both the deep and superficial cultures. ID changed abx to IV ceftriaxone pending suseptibilities. ID recommending 6wks IV abx. Pt taken for ex lap yesterday for obstruction, feels better this am. Both drains have been removed. Orthopedically stable for discharge.     WBAT all extremities, plan to place a proveena on the incision later today  Regular diet  Continue abx per id  ID consulted, appreciate recs   Picc line placement and 6wks iv ceftriaxone  Multimodal pain control  PT/OT

## 2025-01-17 NOTE — ASSESSMENT & PLAN NOTE
Presenting with delayed wound dehiscence in setting of recent fusion surgery as below.  Status post I&D 1/10/25.  Intraoperative findings notable for wound dehiscence to level of fascia which appeared intact.  Fascia was opened and deep space was explored.  Superficial and deep cultures grew Proteus.  Concern for HW involvement.    Continue ceftriaxone 2g IV Q24 through 2/21/25 to complete 6 weeks postop given concern for HW infection  Monitor closely while on antibiotics for toxicities including diarrhea, rash, cytopenias, or kidney injury  Check weekly CBC-diff, CMP while on IV antibiotics to monitor for toxicities.  D/C planning for STR  Outpatient follow-up with ID 2 weeks after D/C  D/C PICC after last dose of IV antibiotics  Given retained HW, will likely need suppressive antibiotics after IV course complete

## 2025-01-17 NOTE — TELEPHONE ENCOUNTER
01/17/25    Miss. Vázquez from [facility] called office today asking for some assistance in reach out to the Patient (ANOTHER CONTACT NUMBER), due that Miss. Vázquez stated they have been trying to reach patient to schedule a Delivery to her Home for the INGREZZA Medication.     Unfortunately, I was not able to provide any other contact Number, but offered to send a message in-case patient is reach from our end or contact our office message can be related.    Miss. Vázquez UNDERSTOOD and ACCEPTED.     Please Provide to Patient the following contact Number to schedule for delivery, [phone].      Any questions, please contact Miss. Vázquez.  Thank You.

## 2025-01-17 NOTE — ASSESSMENT & PLAN NOTE
Patient is on Ingrezza which is nonformulary, continue substitute with Cogentin 1 mg twice daily while inpatient  Outpt f/u w/ psychiatry and neurology

## 2025-01-17 NOTE — ASSESSMENT & PLAN NOTE
Prior Hgb range of 12-13 in 2023 however most recently in the 9s since her surgery in 12/2024   Hgb continues to downtrend   Folate & Vit B12 WNL in 11/2024   Iron panel reviewed in 01/2025, per calculated transferrin saturation, not consistent with GALI  No obvious acute source of bleeding  Monitor and transfuse PRN     Recent Labs     01/15/25  0511 01/16/25  1018 01/17/25  0500   HGB 9.0* 7.9* 7.6*

## 2025-01-17 NOTE — PROGRESS NOTES
Progress Note - Orthopedics   Name: Samantha Rodriguez 59 y.o. female I MRN: [id number]  Unit/Bed#: [facility] 615-01 I Date of Admission: 1/9/2025   Date of Service: 1/17/2025 I Hospital Day: 8    Assessment & Plan  Lumbar radiculopathy  See above  Class 2 obesity without serious comorbidity with body mass index (BMI) of 39.0 to 39.9 in adult    Wound infection complicating hardware (HCC)  Samantha is a 60 y/o F presenting 4 weeks s/p  weeks s/p removal hardware L2-S1, posterior fusion L5-S1, revision instrumentation L2-pelvis, bilateral S2 alar screws with Dr. Bills and Dr. Stevens (DOS 12/19/24) with concern for wound dehiscence.      Now s/p I&D lumbar spine. Doing well. OR cultures growing proteus in both the deep and superficial cultures. ID changed abx to IV ceftriaxone pending suseptibilities. ID recommending 6wks IV abx. Pt taken for ex lap yesterday for obstruction, feels better this am. Both drains have been removed. Orthopedically stable for discharge.     WBAT all extremities, plan to place a proveena on the incision later today  Regular diet  Continue abx per id  ID consulted, appreciate recs   Picc line placement and 6wks iv ceftriaxone  Multimodal pain control  PT/OT        Subjective   59 y.o.female doing well. No acute events, no new complaints. Pain well controlled. Denies fevers, chills, CP, SOB, N/V, numbness or tingling. Patient reports no issues with urination or bowel movements    Objective :  Temp:  [97.8 °F (36.6 °C)-98.4 °F (36.9 °C)] 97.8 °F (36.6 °C)  HR:  [59-72] 64  BP: ()/(45-71) 108/71  Resp:  [17-18] 18  SpO2:  [93 %-100 %] 97 %  O2 Device: None (Room air)    Physical Exam  Musculoskeletal: bilateral lower  Skin intact . No erythema or ecchymosis.  Dressing c/d/i  TTP sona-incisional   Motor intact to +FHL/EHL, +ankle dorsi/plantar flexion  2+ DP pulse  Sensation intact L3-S1  Motor intact L3-S1      Lab Results: I have reviewed the following results:  Recent Labs     01/15/25  0511  "01/16/25  1018 01/17/25  0500   WBC 16.76* 7.99 5.61   HGB 9.0* 7.9* 7.6*   HCT 29.6* 26.0* 24.8*   * 437* 416*   BUN 9 6 5   CREATININE 0.52* 0.53* 0.54*     Blood Culture:    Lab Results   Component Value Date    BLOODCX No Growth After 5 Days. 01/03/2025    BLOODCX No Growth After 5 Days. 01/03/2025     Wound Culture: No results found for: \"WOUNDCULT\"  "

## 2025-01-17 NOTE — PLAN OF CARE
Problem: OCCUPATIONAL THERAPY ADULT  Goal: Performs self-care activities at highest level of function for planned discharge setting.  See evaluation for individualized goals.  Description: Treatment Interventions: ADL retraining, Functional transfer training, Endurance training, Cognitive reorientation, Patient/family training, Equipment evaluation/education, Compensatory technique education, Energy conservation, Activityengagement          See flowsheet documentation for full assessment, interventions and recommendations.   Outcome: Progressing  Note: Limitation: Decreased ADL status, Decreased cognition, Decreased endurance, Decreased self-care trans, Decreased high-level ADLs  Prognosis: Good  Assessment: pt participated in pm ot session and was seen focusing on lb adls while seted in chair using lhae. she has limited problem solving ability inorder to use dressing stick and reacher effectively thus required min asst to set device into sock top. pt did eventually doff socks. she did use reacher to  3 different items from floor with trials and error. she reports having a reacher. pt with little intrest this date in sock aide. did pull on sock r foot with mod asst. pt with some discomfort in abd area with  with ue pulling and use of devices. pt was pleasant and cooperative. will follow focusing on goals from ie     Rehab Resource Intensity Level, OT: II (Moderate Resource Intensity)        April A Storm

## 2025-01-17 NOTE — ASSESSMENT & PLAN NOTE
Initially presented to [facility] on 010/2 w/ ambulatory dysfunction. She was recently discharged from rehab after back surgery in 12/2024. At [facility], she was diagnosed w/ a UTI and RSV.  Patient also noted to have small area of wound dehiscence at the distal aspect of the wound from recent surgical hardware removal.   CT showed ill-defined fluid with pockets of soft tissue gas and surrounding inflammatory stranding in the midline lower back suspicious for an abscess; she was transferred to [facility] [place] [facility]opedic evaluation.  BCx (01/03) w/o growth x 5 days   S/P I&D lumbar spine on 01/10 with ortho. Cloudy fluid noted superficially and fluid tracking deeper to the hardware.   Lumbar drains x 2 removed   Cultures growing proteus  ID following, plan to continue IV Ceftriaxone x 6 weeks postop through 02/21/25  PICC line placed 01/13   Pain control per APS recommendations   PT/OT recommending rehab

## 2025-01-17 NOTE — PROGRESS NOTES
Progress Note - Hospitalist   Name: Samantha Rodriguez 59 y.o. female I MRN: [id number]  [facility] I Date of Admission: 1/9/2025   Date of Service: 1/17/2025 I Hospital Day: 8    Assessment & Plan  Wound infection complicating hardware (HCC)  Initially presented to [facility] on 010/2 w/ ambulatory dysfunction. She was recently discharged from rehab after back surgery in 12/2024. At [facility], she was diagnosed w/ a UTI and RSV.  Patient also noted to have small area of wound dehiscence at the distal aspect of the wound from recent surgical hardware removal.   CT showed ill-defined fluid with pockets of soft tissue gas and surrounding inflammatory stranding in the midline lower back suspicious for an abscess; she was transferred to [facility] for orthopedic evaluation.  BCx (01/03) w/o growth x 5 days   S/P I&D lumbar spine on 01/10 with ortho. Cloudy fluid noted superficially and fluid tracking deeper to the hardware.   Lumbar drains x 2 removed   Cultures growing proteus  ID following, plan to continue IV Ceftriaxone x 6 weeks postop through 02/21/25  PICC line placed 01/13   Pain control per APS recommendations   PT/OT recommending rehab  Small bowel obstruction due to adhesions (HCC)  Noted to have worsening epigastric sharp pain that started 01/8 overnight with an episode of vomiting. Required brief NGT placement.  Patient's symptoms improved with conservative care, but then re-developed 01/13.  Imaging consistent with SBO  S/p ex lap with ALLISON of obstructive pelvic adhesions 01/14 - operative findings suggestive of adhesive distal obstruction and possible closed-loop obstruction  Transferred to to General surgery as primary postoperatively; SLIM will remain on as consult  NGT discontinued 01/16  CLD with IVF  Essential hypertension  SBP range soft ()  Reduce PTA amlodipine from 5 to 2.5 mg daily  C/w PTA prazosin 2 mg nightly  PRN IV Lopressor for BP spikes  Bipolar disorder (HCC)  History of severe MDD and  AMAURY with panic attacks  C/w PTA medications with Abilify, Zoloft, BuSpar, Minipress, Atarax, trazodone   Anemia  Prior Hgb range of 12-13 in 2023 however most recently in the 9s since her surgery in 12/2024   Hgb continues to downtrend   Folate & Vit B12 WNL in 11/2024   Iron panel reviewed in 01/2025, per calculated transferrin saturation, not consistent with GALI  No obvious acute source of bleeding  Monitor and transfuse PRN     Recent Labs     01/15/25  0511 01/16/25  1018 01/17/25  0500   HGB 9.0* 7.9* 7.6*     Chronic pain disorder  Multiple short courses of opioids noted in history  APS following   Lumbar radiculopathy  S/p removal hardware L2-S1, posterior fusion L5-S1, revision instrumentation L2-pelvis, bilateral S2 alar screws with orthopedic surgery on 12/19. Now dehiscence and concern for abscess as above.  S/p I&D as above  Pain control as above  GERD without esophagitis  Currently on IV PPI twice daily  Tardive dyskinesia  Patient is on Ingrezza which is nonformulary, continue substitute with Cogentin 1 mg twice daily while inpatient  Outpt f/u w/ psychiatry and neurology  RSV bronchitis  Tested positive for RSV on 1/3/2025  Chest x-ray w/o acute findings  Completed 5 days of steroid therapy  No current respiratory symptoms  Ambulatory dysfunction  Recommended rehab as above   History of CVA (cerebrovascular accident)  Continue aspirin and statin  Class 2 obesity without serious comorbidity with body mass index (BMI) of 39.0 to 39.9 in adult  Weight loss/lifestyle modifications as outpatient   History of UTI  Treated with 3 days of IV Ancef for E. coli prior to transfer    VTE Pharmacologic Prophylaxis:   Lovenox per primary team    Mobility:   Basic Mobility Inpatient Raw Score: 16  JH-HLM Goal: 5: Stand one or more mins  JH-HLM Achieved: 3: Sit at edge of bed  JH-HLM Goal NOT achieved. Continue with multidisciplinary rounding and encourage appropriate mobility to improve upon JH-HLM  goals.    Discussions with Specialists or Other Care Team Provider: General surgery primary; infectious disease note following; I did personally review both her notes    Education and Discussions with Family / Patient:  Per primary service.     Current Length of Stay: 8 day(s)  Current Patient Status: Inpatient   Certification Statement:  Per primary service  Discharge Plan:  Per primary service    Code Status: Level 1 - Full Code    Subjective   C/o diffuse abd pain, pt requesting dilaudid. No recent BM; she is passing some gas. W/o SOB or CP.     Objective :  Temp:  [97.8 °F (36.6 °C)-98.4 °F (36.9 °C)] 97.9 °F (36.6 °C)  HR:  [59-72] 72  BP: ()/(45-71) 100/62  Resp:  [17-18] 18  SpO2:  [93 %-97 %] 97 %  O2 Device: None (Room air)    Body mass index is 42.58 kg/m².     Input and Output Summary (last 24 hours):     Intake/Output Summary (Last 24 hours) at 1/17/2025 1122  Last data filed at 1/17/2025 0947  Gross per 24 hour   Intake 3445.55 ml   Output 1175 ml   Net 2270.55 ml       Physical Exam  Constitutional:       General: She is not in acute distress.     Appearance: She is not toxic-appearing.      Comments: Overweight   HENT:      Head: Normocephalic.      Right Ear: External ear normal.      Left Ear: External ear normal.      Nose: Nose normal.      Mouth/Throat:      Mouth: Mucous membranes are moist.      Pharynx: Oropharynx is clear.      Comments: Lip smacking   Eyes:      Extraocular Movements: Extraocular movements intact.      Conjunctiva/sclera: Conjunctivae normal.   Cardiovascular:      Rate and Rhythm: Normal rate and regular rhythm.      Pulses: Normal pulses.   Pulmonary:      Effort: Pulmonary effort is normal. No respiratory distress.      Breath sounds: Normal breath sounds. No wheezing or rales.   Abdominal:      General: Bowel sounds are normal. There is distension.      Palpations: Abdomen is soft.      Tenderness: There is abdominal tenderness (mild diffuse). There is no guarding  or rebound.   Musculoskeletal:         General: Swelling (Trace non pitting) present. Normal range of motion.      Cervical back: Normal range of motion.   Skin:     General: Skin is warm and dry.      Coloration: Skin is not jaundiced.      Findings: No bruising or lesion.   Neurological:      General: No focal deficit present.      Mental Status: She is alert. Mental status is at baseline.   Psychiatric:         Mood and Affect: Mood normal.         Behavior: Behavior normal.         Lines/Drains:  Lines/Drains/Airways       Active Status       Name Placement date Placement time Site Days    PICC Line 01/13/25 Left Basilic 01/13/25  1118  Basilic  4                    Central Line:  Goal for removal: Will discontinue when peripheral access obtained.                Lab Results: I have reviewed the following results:   Results from last 7 days   Lab Units 01/17/25  0500   WBC Thousand/uL 5.61   HEMOGLOBIN g/dL 7.6*   HEMATOCRIT % 24.8*   PLATELETS Thousands/uL 416*   SEGS PCT % 60   LYMPHO PCT % 21   MONO PCT % 10   EOS PCT % 7*     Results from last 7 days   Lab Units 01/17/25  0500 01/16/25  1018   SODIUM mmol/L 141 139   POTASSIUM mmol/L 3.5 3.1*   CHLORIDE mmol/L 106 103   CO2 mmol/L 31 28   BUN mg/dL 5 6   CREATININE mg/dL 0.54* 0.53*   ANION GAP mmol/L 4 8   CALCIUM mg/dL 7.6* 7.7*   ALBUMIN g/dL  --  2.9*   TOTAL BILIRUBIN mg/dL  --  0.55   ALK PHOS U/L  --  91   ALT U/L  --  4*   AST U/L  --  11*   GLUCOSE RANDOM mg/dL 87 109         Results from last 7 days   Lab Units 01/10/25  1427   POC GLUCOSE mg/dl 88         Results from last 7 days   Lab Units 01/14/25  1309   LACTIC ACID mmol/L 1.2       Recent Cultures (last 7 days):   Results from last 7 days   Lab Units 01/10/25  1227 01/10/25  1220   GRAM STAIN RESULT  No Polys or Bacteria seen 3+ Polys  No organisms seen       Imaging Results Review: I reviewed radiology reports from this admission including: CT abdomen/pelvis.  Other Study Results Review: No  additional pertinent studies reviewed.    Last 24 Hours Medication List:     Current Facility-Administered Medications:     acetaminophen (TYLENOL) tablet 975 mg, Q8H KESHIA    albuterol (PROVENTIL HFA,VENTOLIN HFA) inhaler 2 puff, Q4H PRN    alteplase (CATHFLO) injection 2 mg, Once    aluminum-magnesium hydroxide-simethicone (MAALOX) oral suspension 30 mL, Q4H PRN    amLODIPine (NORVASC) tablet 5 mg, Daily    ARIPiprazole (ABILIFY) tablet 10 mg, Daily    aspirin (ECOTRIN LOW STRENGTH) EC tablet 81 mg, Daily    atorvastatin (LIPITOR) tablet 40 mg, Daily With Dinner    benztropine (COGENTIN) tablet 1 mg, BID    bupivacaine liposomal (EXPAREL) 1.3 % injection 20 mL, Once    busPIRone (BUSPAR) tablet 15 mg, TID    cefTRIAXone (ROCEPHIN) 2,000 mg in dextrose 5 % 50 mL IVPB, Q24H, Last Rate: Stopped (01/17/25 0947)    dextromethorphan-guaiFENesin (ROBITUSSIN DM) oral syrup 10 mL, Q4H PRN    enoxaparin (LOVENOX) subcutaneous injection 40 mg, Daily    HYDROmorphone HCl (DILAUDID) injection 0.2 mg, Q2H PRN **FOLLOWED BY** HYDROmorphone HCl (DILAUDID) injection 0.2 mg, Q4H PRN    hydrOXYzine HCL (ATARAX) tablet 25 mg, TID    lidocaine (LIDODERM) 5 % patch 1 patch, Daily    [Held by provider] lubiprostone (AMITIZA) capsule 8 mcg, BID    methocarbamol (ROBAXIN) tablet 500 mg, Q6H KESHIA    metoprolol (LOPRESSOR) injection 5 mg, Q6H PRN    multi-electrolyte (PLASMALYTE-A/ISOLYTE-S PH 7.4) IV solution, Continuous, Last Rate: 84 mL/hr (01/17/25 0905)    naloxone (NARCAN) 0.04 mg/mL syringe 0.04 mg, Q1MIN PRN    ondansetron (ZOFRAN) injection 4 mg, Q6H PRN    oxyCODONE (ROXICODONE) IR tablet 5 mg, Q4H PRN **OR** oxyCODONE (ROXICODONE) immediate release tablet 10 mg, Q4H PRN    pantoprazole (PROTONIX) EC tablet 40 mg, BID AC    phenol (CHLORASEPTIC) 1.4 % mucosal liquid 1 spray, Q2H PRN    pneumococcal 20-cheryl conj vacc (PREVNAR 20) IM Injection 0.5 mL, Once PRN    prazosin (MINIPRESS) capsule 2 mg, HS    pregabalin (LYRICA) capsule 150  mg, TID    sertraline (ZOLOFT) tablet 200 mg, HS    traZODone (DESYREL) tablet 300 mg, HS PRN    Administrative Statements   Today, Patient Was Seen By: Nedra Woods PA-C  I have spent a total time of 40 minutes in caring for this patient on the day of the visit/encounter including Diagnostic results, Instructions for management, Patient and family education, and Importance of tx compliance.    **Please Note: This note may have been constructed using a voice recognition system.**

## 2025-01-18 ENCOUNTER — APPOINTMENT (INPATIENT)
Dept: RADIOLOGY | Facility: HOSPITAL | Age: 60
DRG: 857 | End: 2025-01-18
Payer: MEDICARE

## 2025-01-18 LAB
ALBUMIN SERPL BCG-MCNC: 2.9 G/DL (ref 3.5–5)
ALP SERPL-CCNC: 111 U/L (ref 34–104)
ALT SERPL W P-5'-P-CCNC: 4 U/L (ref 7–52)
ANION GAP SERPL CALCULATED.3IONS-SCNC: 8 MMOL/L (ref 4–13)
AST SERPL W P-5'-P-CCNC: 11 U/L (ref 13–39)
BILIRUB SERPL-MCNC: 0.5 MG/DL (ref 0.2–1)
BUN SERPL-MCNC: 4 MG/DL (ref 5–25)
CALCIUM ALBUM COR SERPL-MCNC: 8.9 MG/DL (ref 8.3–10.1)
CALCIUM SERPL-MCNC: 8 MG/DL (ref 8.4–10.2)
CHLORIDE SERPL-SCNC: 105 MMOL/L (ref 96–108)
CO2 SERPL-SCNC: 29 MMOL/L (ref 21–32)
CREAT SERPL-MCNC: 0.5 MG/DL (ref 0.6–1.3)
CRP SERPL QL: 79.7 MG/L
ERYTHROCYTE [DISTWIDTH] IN BLOOD BY AUTOMATED COUNT: 15.6 % (ref 11.6–15.1)
GFR SERPL CREATININE-BSD FRML MDRD: 104 ML/MIN/1.73SQ M
GLUCOSE SERPL-MCNC: 91 MG/DL (ref 65–140)
HCT VFR BLD AUTO: 26.8 % (ref 34.8–46.1)
HGB BLD-MCNC: 8 G/DL (ref 11.5–15.4)
MCH RBC QN AUTO: 26.5 PG (ref 26.8–34.3)
MCHC RBC AUTO-ENTMCNC: 29.9 G/DL (ref 31.4–37.4)
MCV RBC AUTO: 89 FL (ref 82–98)
PLATELET # BLD AUTO: 448 THOUSANDS/UL (ref 149–390)
PMV BLD AUTO: 9.3 FL (ref 8.9–12.7)
POTASSIUM SERPL-SCNC: 3.7 MMOL/L (ref 3.5–5.3)
PROT SERPL-MCNC: 5.6 G/DL (ref 6.4–8.4)
RBC # BLD AUTO: 3.02 MILLION/UL (ref 3.81–5.12)
SODIUM SERPL-SCNC: 142 MMOL/L (ref 135–147)
WBC # BLD AUTO: 6.14 THOUSAND/UL (ref 4.31–10.16)

## 2025-01-18 PROCEDURE — 74018 RADEX ABDOMEN 1 VIEW: CPT

## 2025-01-18 PROCEDURE — 80053 COMPREHEN METABOLIC PANEL: CPT

## 2025-01-18 PROCEDURE — 99024 POSTOP FOLLOW-UP VISIT: CPT | Performed by: SURGERY

## 2025-01-18 PROCEDURE — 86140 C-REACTIVE PROTEIN: CPT

## 2025-01-18 PROCEDURE — NC001 PR NO CHARGE: Performed by: ORTHOPAEDIC SURGERY

## 2025-01-18 PROCEDURE — 85027 COMPLETE CBC AUTOMATED: CPT

## 2025-01-18 RX ADMIN — ACETAMINOPHEN 975 MG: 325 TABLET, FILM COATED ORAL at 05:07

## 2025-01-18 RX ADMIN — ACETAMINOPHEN 975 MG: 325 TABLET, FILM COATED ORAL at 21:32

## 2025-01-18 RX ADMIN — HYDROXYZINE HYDROCHLORIDE 25 MG: 25 TABLET, FILM COATED ORAL at 08:07

## 2025-01-18 RX ADMIN — HYDROMORPHONE HYDROCHLORIDE 0.2 MG: 0.2 INJECTION, SOLUTION INTRAMUSCULAR; INTRAVENOUS; SUBCUTANEOUS at 00:22

## 2025-01-18 RX ADMIN — ONDANSETRON 4 MG: 2 INJECTION INTRAMUSCULAR; INTRAVENOUS at 17:55

## 2025-01-18 RX ADMIN — PREGABALIN 150 MG: 75 CAPSULE ORAL at 08:07

## 2025-01-18 RX ADMIN — ALUMINUM HYDROXIDE, MAGNESIUM HYDROXIDE, AND SIMETHICONE 30 ML: 200; 200; 20 SUSPENSION ORAL at 11:29

## 2025-01-18 RX ADMIN — BUSPIRONE HYDROCHLORIDE 15 MG: 10 TABLET ORAL at 08:06

## 2025-01-18 RX ADMIN — BUSPIRONE HYDROCHLORIDE 15 MG: 10 TABLET ORAL at 15:50

## 2025-01-18 RX ADMIN — PANTOPRAZOLE SODIUM 40 MG: 40 TABLET, DELAYED RELEASE ORAL at 15:50

## 2025-01-18 RX ADMIN — ASPIRIN 81 MG: 81 TABLET, COATED ORAL at 08:07

## 2025-01-18 RX ADMIN — ENOXAPARIN SODIUM 40 MG: 40 INJECTION SUBCUTANEOUS at 08:06

## 2025-01-18 RX ADMIN — PREGABALIN 150 MG: 75 CAPSULE ORAL at 21:31

## 2025-01-18 RX ADMIN — OXYCODONE HYDROCHLORIDE 10 MG: 10 TABLET ORAL at 21:48

## 2025-01-18 RX ADMIN — OXYCODONE HYDROCHLORIDE 10 MG: 10 TABLET ORAL at 05:07

## 2025-01-18 RX ADMIN — CEFTRIAXONE SODIUM 2000 MG: 10 INJECTION, POWDER, FOR SOLUTION INTRAVENOUS at 10:08

## 2025-01-18 RX ADMIN — SODIUM CHLORIDE, SODIUM GLUCONATE, SODIUM ACETATE, POTASSIUM CHLORIDE, MAGNESIUM CHLORIDE, SODIUM PHOSPHATE, DIBASIC, AND POTASSIUM PHOSPHATE 84 ML/HR: .53; .5; .37; .037; .03; .012; .00082 INJECTION, SOLUTION INTRAVENOUS at 08:10

## 2025-01-18 RX ADMIN — ARIPIPRAZOLE 10 MG: 10 TABLET ORAL at 08:08

## 2025-01-18 RX ADMIN — ATORVASTATIN CALCIUM 40 MG: 40 TABLET, FILM COATED ORAL at 15:50

## 2025-01-18 RX ADMIN — TRIMETHOBENZAMIDE HYDROCHLORIDE 200 MG: 100 INJECTION INTRAMUSCULAR at 00:48

## 2025-01-18 RX ADMIN — SERTRALINE HYDROCHLORIDE 200 MG: 100 TABLET ORAL at 21:32

## 2025-01-18 RX ADMIN — HYDROXYZINE HYDROCHLORIDE 25 MG: 25 TABLET, FILM COATED ORAL at 15:50

## 2025-01-18 RX ADMIN — METHOCARBAMOL 500 MG: 500 TABLET ORAL at 11:29

## 2025-01-18 RX ADMIN — TRAZODONE HYDROCHLORIDE 300 MG: 100 TABLET ORAL at 21:34

## 2025-01-18 RX ADMIN — SODIUM CHLORIDE, SODIUM GLUCONATE, SODIUM ACETATE, POTASSIUM CHLORIDE, MAGNESIUM CHLORIDE, SODIUM PHOSPHATE, DIBASIC, AND POTASSIUM PHOSPHATE 84 ML/HR: .53; .5; .37; .037; .03; .012; .00082 INJECTION, SOLUTION INTRAVENOUS at 21:39

## 2025-01-18 RX ADMIN — BENZTROPINE MESYLATE 1 MG: 1 TABLET ORAL at 17:54

## 2025-01-18 RX ADMIN — BISACODYL 10 MG: 10 SUPPOSITORY RECTAL at 08:08

## 2025-01-18 RX ADMIN — PANTOPRAZOLE SODIUM 40 MG: 40 TABLET, DELAYED RELEASE ORAL at 08:07

## 2025-01-18 RX ADMIN — METHOCARBAMOL 500 MG: 500 TABLET ORAL at 05:07

## 2025-01-18 RX ADMIN — BENZTROPINE MESYLATE 1 MG: 1 TABLET ORAL at 08:08

## 2025-01-18 RX ADMIN — POTASSIUM CHLORIDE 30 MEQ: 1500 TABLET, EXTENDED RELEASE ORAL at 10:08

## 2025-01-18 RX ADMIN — METHOCARBAMOL 500 MG: 500 TABLET ORAL at 17:54

## 2025-01-18 RX ADMIN — BUSPIRONE HYDROCHLORIDE 15 MG: 10 TABLET ORAL at 21:32

## 2025-01-18 RX ADMIN — ACETAMINOPHEN 975 MG: 325 TABLET, FILM COATED ORAL at 13:22

## 2025-01-18 RX ADMIN — AMLODIPINE BESYLATE 2.5 MG: 2.5 TABLET ORAL at 08:07

## 2025-01-18 RX ADMIN — TRIMETHOBENZAMIDE HYDROCHLORIDE 200 MG: 100 INJECTION INTRAMUSCULAR at 15:45

## 2025-01-18 RX ADMIN — LIDOCAINE 1 PATCH: 700 PATCH TOPICAL at 21:32

## 2025-01-18 RX ADMIN — SENNOSIDES AND DOCUSATE SODIUM 1 TABLET: 50; 8.6 TABLET ORAL at 21:32

## 2025-01-18 RX ADMIN — PRAZOSIN HYDROCHLORIDE 2 MG: 2 CAPSULE ORAL at 21:32

## 2025-01-18 RX ADMIN — PREGABALIN 150 MG: 75 CAPSULE ORAL at 15:50

## 2025-01-18 RX ADMIN — HYDROXYZINE HYDROCHLORIDE 25 MG: 25 TABLET, FILM COATED ORAL at 21:31

## 2025-01-18 NOTE — QUICK NOTE
Patient was not personally seen or examined today.  Chart thoroughly reviewed including vital signs, laboratory studies, progress notes.  Patient has been cleared for discharge from orthopedic surgery standpoint.  General surgery is primary and managing SBO, diet advanced today and continues on IV fluids/antibiotics.  APS following and managing pain regimen.  ID following and managing antibiotic.  SLIM following along for management of bipolar disorder and hypertension.  Blood pressure improved/stable with decreasing dose of amlodipine yesterday.  No changes to medical management today.  SLIM will continue to follow peripherally, please call with questions or concerns.

## 2025-01-18 NOTE — PROGRESS NOTES
Progress Note - Surgery-General   Name: Samantha Rodriguez 59 y.o. female I MRN: [id number]  Unit/Bed#: [facility] 615-01 I Date of Admission: 1/9/2025   Date of Service: 1/18/2025 I Hospital Day: 9    Assessment & Plan  Small bowel obstruction due to adhesions (HCC)  S/p ex lap, ALLISON of obstructive pelvic adhesions.  Operative findings suggestive of adhesive distal obstruction and possible closed-loop obstruction.  She says she feels more bloated today, with distention noted on exam    Plan:  -Advance to clears toast crackers  -Maintain PICC  -Continue isolye@84cc  -Continue antibiotics through 1221  -Appreciate APS recommendations  -PT OT: Level 2  -DVT prophylaxis          Subjective   No acute events overnight.  Patient reports having some intermittent nausea and some belching but not had any episodes of emesis.  She is reported passing flatus and had 1 bowel movement.    Objective :  Temp:  [97.9 °F (36.6 °C)-98.9 °F (37.2 °C)] 98.8 °F (37.1 °C)  HR:  [68-76] 71  BP: (121-126)/(73-79) 121/77  Resp:  [16-20] 20  SpO2:  [95 %-98 %] 98 %  O2 Device: None (Room air)    I/O         01/16 0701  01/17 0700 01/17 0701  01/18 0700 01/18 0701  01/19 0700    P.O. 920 340     I.V. (mL/kg) 2477.6 (25.1) 588 (5.9) 243.6 (2.5)    IV Piggyback 50 50     Total Intake(mL/kg) 3447.6 (34.9) 978 (9.9) 243.6 (2.5)    Urine (mL/kg/hr) 1170 (0.5) 975 (0.4)     Emesis/NG output       Drains  0     Stool  0     Total Output 1170 975     Net +2277.6 +3 +243.6           Unmeasured Urine Occurrence 3 x      Unmeasured Stool Occurrence  1 x           Lines/Drains/Airways       Active Status       Name Placement date Placement time Site Days    PICC Line 01/13/25 Left Basilic 01/13/25  1118  Basilic  4                  Physical Exam  Constitutional:       General: She is not in acute distress.     Appearance: Normal appearance. She is not toxic-appearing.   HENT:      Head: Normocephalic.      Mouth/Throat:      Mouth: Mucous membranes are moist.    Eyes:      Extraocular Movements: Extraocular movements intact.      Pupils: Pupils are equal, round, and reactive to light.   Cardiovascular:      Rate and Rhythm: Normal rate.   Pulmonary:      Effort: Pulmonary effort is normal.   Abdominal:      General: There is distension.      Palpations: Abdomen is soft. There is no mass.      Tenderness: There is abdominal tenderness. There is no guarding or rebound.      Comments: Post surgical tenderness, incisions c/d/i   Skin:     General: Skin is warm.   Neurological:      General: No focal deficit present.      Mental Status: She is alert and oriented to person, place, and time.         Lab Results: I have reviewed the following results:  Recent Labs     01/16/25  1018 01/17/25  0500 01/18/25  0512   WBC 7.99   < > 6.14   HGB 7.9*   < > 8.0*   HCT 26.0*   < > 26.8*   *   < > 448*   SODIUM 139   < > 142   K 3.1*   < > 3.7      < > 105   CO2 28   < > 29   BUN 6   < > 4*   CREATININE 0.53*   < > 0.50*   GLUC 109   < > 91   MG 1.9  --   --    PHOS 3.3  --   --    AST 11*  --  11*   ALT 4*  --  4*   ALB 2.9*  --  2.9*   TBILI 0.55  --  0.50   ALKPHOS 91  --  111*    < > = values in this interval not displayed.             VTE Pharmacologic Prophylaxis: VTE covered by:  enoxaparin, Subcutaneous, 40 mg at 01/18/25 0806     VTE Mechanical Prophylaxis: sequential compression device

## 2025-01-18 NOTE — NURSING NOTE
Dr Saba made aware that pt vac was out. Came to [facility]; was told to keep off and they would re-apply in the am.

## 2025-01-18 NOTE — ASSESSMENT & PLAN NOTE
Samantha is a 58 y/o F presenting 4 weeks s/p  weeks s/p removal hardware L2-S1, posterior fusion L5-S1, revision instrumentation L2-pelvis, bilateral S2 alar screws with Dr. Bills and Dr. Stevens (DOS 12/19/24) with concern for wound dehiscence.      Now s/p I&D lumbar spine. Doing well. OR cultures growing proteus in both the deep and superficial cultures. ID changed abx to IV ceftriaxone pending suseptibilities. ID recommending 6wks IV abx. Pt taken for ex lap for obstruction, continues to feel bloated. Both drains have been removed. Prevena applied to incision. Orthopedically stable for discharge.    Plan:     WBAT all extremities  Continue Prevena   Regular diet  Continue abx per ID  ID consulted, appreciate recs   Picc line placement and 6wks iv ceftriaxone  Multimodal pain control  PT/OT

## 2025-01-18 NOTE — NURSING NOTE
Pt assisted up OOB with LSO brace, ambulated 60 ft in hallway. Tolerated well. Sitting in recliner with legs up, chair alarm on.

## 2025-01-18 NOTE — ASSESSMENT & PLAN NOTE
S/p ex lap, ALLISON of obstructive pelvic adhesions.  Operative findings suggestive of adhesive distal obstruction and possible closed-loop obstruction.  She says she feels more bloated today, with distention noted on exam    Plan:  -Advance to clears toast crackers  -Maintain PICC  -Continue isolye@84cc  -Continue antibiotics through 1221  -Appreciate APS recommendations  -PT OT: Level 2  -DVT prophylaxis

## 2025-01-18 NOTE — PROGRESS NOTES
Progress Note - Orthopedics   Name: Samantha Rodriguez 59 y.o. female I MRN: [id number]  [facility] I Date of Admission: 1/9/2025   Date of Service: 1/18/2025 I Hospital Day: 9    Assessment & Plan  Wound infection complicating hardware (HCC)  Samantha is a 60 y/o F presenting 4 weeks s/p  weeks s/p removal hardware L2-S1, posterior fusion L5-S1, revision instrumentation L2-pelvis, bilateral S2 alar screws with Dr. Bills and Dr. Stevens (DOS 12/19/24) with concern for wound dehiscence.      Now s/p I&D lumbar spine. Doing well. OR cultures growing proteus in both the deep and superficial cultures. ID changed abx to IV ceftriaxone pending suseptibilities. ID recommending 6wks IV abx. Pt taken for ex lap for obstruction, continues to feel bloated. Both drains have been removed. Prevena applied to incision. Orthopedically stable for discharge.    Plan:     WBAT all extremities  Continue Prevena   Regular diet  Continue abx per ID  ID consulted, appreciate recs   Picc line placement and 6wks iv ceftriaxone  Multimodal pain control  PT/OT    Please contact the SecureChat role for the Orthopedics service with any questions/concerns.    Subjective   No acute events, no acute distress. Denies fever/chills, SOB. Pain well controlled. No issues with urination or bowel movements. Continues to feel bloated.    Objective      Temp:  [97.9 °F (36.6 °C)-98.9 °F (37.2 °C)] 98.7 °F (37.1 °C)  HR:  [63-76] 68  BP: (100-126)/(62-79) 122/77  Resp:  [16-18] 16  SpO2:  [95 %-97 %] 95 %  O2 Device: None (Room air)  O2 Device: None (Room air)          I/O         01/16 0701  01/17 0700 01/17 0701  01/18 0700    P.O. 920 240    I.V. (mL/kg) 2477.6 (25.1)     IV Piggyback 50 50    Total Intake(mL/kg) 3447.6 (34.9) 290 (2.9)    Urine (mL/kg/hr) 1170 (0.5) 625 (0.3)    Drains  0    Total Output 1170 625    Net +2277.6 -335          Unmeasured Urine Occurrence 3 x           Lines/Drains/Airways       Active Status       Name  "Placement date Placement time Site Days    PICC Line 01/13/25 Left Basilic 01/13/25  1118  Basilic  4                  Physical Exam   Musculoskeletal: bilateral lower  Skin intact . No erythema or ecchymosis.  Dressing c/d/i  TTP sona-incisional   Motor intact to +FHL/EHL, +ankle dorsi/plantar flexion  2+ DP pulse  Sensation intact L3-S1  Motor intact L3-S1  Prevena in place.        Lab Results: I have reviewed the following results:  Recent Labs     01/15/25  0511 01/16/25  1018 01/17/25  0500   WBC 16.76* 7.99 5.61   HGB 9.0* 7.9* 7.6*   HCT 29.6* 26.0* 24.8*   * 437* 416*   BUN 9 6 5   CREATININE 0.52* 0.53* 0.54*     Blood Culture:    Lab Results   Component Value Date    BLOODCX No Growth After 5 Days. 01/03/2025    BLOODCX No Growth After 5 Days. 01/03/2025     Wound Culture: No results found for: \"WOUNDCULT\"  "

## 2025-01-19 VITALS
TEMPERATURE: 98.8 F | RESPIRATION RATE: 18 BRPM | WEIGHT: 218.7 LBS | HEIGHT: 60 IN | HEART RATE: 84 BPM | BODY MASS INDEX: 42.94 KG/M2 | OXYGEN SATURATION: 99 % | SYSTOLIC BLOOD PRESSURE: 133 MMHG | DIASTOLIC BLOOD PRESSURE: 80 MMHG

## 2025-01-19 LAB
ANION GAP SERPL CALCULATED.3IONS-SCNC: 6 MMOL/L (ref 4–13)
BASOPHILS # BLD AUTO: 0.02 THOUSANDS/ΜL (ref 0–0.1)
BASOPHILS NFR BLD AUTO: 0 % (ref 0–1)
BUN SERPL-MCNC: 3 MG/DL (ref 5–25)
CALCIUM SERPL-MCNC: 7.9 MG/DL (ref 8.4–10.2)
CHLORIDE SERPL-SCNC: 106 MMOL/L (ref 96–108)
CO2 SERPL-SCNC: 29 MMOL/L (ref 21–32)
CREAT SERPL-MCNC: 0.5 MG/DL (ref 0.6–1.3)
EOSINOPHIL # BLD AUTO: 0.39 THOUSAND/ΜL (ref 0–0.61)
EOSINOPHIL NFR BLD AUTO: 7 % (ref 0–6)
ERYTHROCYTE [DISTWIDTH] IN BLOOD BY AUTOMATED COUNT: 15.3 % (ref 11.6–15.1)
GFR SERPL CREATININE-BSD FRML MDRD: 104 ML/MIN/1.73SQ M
GLUCOSE SERPL-MCNC: 85 MG/DL (ref 65–140)
HCT VFR BLD AUTO: 27.6 % (ref 34.8–46.1)
HGB BLD-MCNC: 8.2 G/DL (ref 11.5–15.4)
IMM GRANULOCYTES # BLD AUTO: 0.03 THOUSAND/UL (ref 0–0.2)
IMM GRANULOCYTES NFR BLD AUTO: 1 % (ref 0–2)
LYMPHOCYTES # BLD AUTO: 1.02 THOUSANDS/ΜL (ref 0.6–4.47)
LYMPHOCYTES NFR BLD AUTO: 18 % (ref 14–44)
MCH RBC QN AUTO: 26.6 PG (ref 26.8–34.3)
MCHC RBC AUTO-ENTMCNC: 29.7 G/DL (ref 31.4–37.4)
MCV RBC AUTO: 90 FL (ref 82–98)
MONOCYTES # BLD AUTO: 0.41 THOUSAND/ΜL (ref 0.17–1.22)
MONOCYTES NFR BLD AUTO: 7 % (ref 4–12)
NEUTROPHILS # BLD AUTO: 3.83 THOUSANDS/ΜL (ref 1.85–7.62)
NEUTS SEG NFR BLD AUTO: 67 % (ref 43–75)
NRBC BLD AUTO-RTO: 0 /100 WBCS
PLATELET # BLD AUTO: 465 THOUSANDS/UL (ref 149–390)
PMV BLD AUTO: 9.2 FL (ref 8.9–12.7)
POTASSIUM SERPL-SCNC: 3.9 MMOL/L (ref 3.5–5.3)
RBC # BLD AUTO: 3.08 MILLION/UL (ref 3.81–5.12)
SODIUM SERPL-SCNC: 141 MMOL/L (ref 135–147)
WBC # BLD AUTO: 5.7 THOUSAND/UL (ref 4.31–10.16)

## 2025-01-19 PROCEDURE — 85025 COMPLETE CBC W/AUTO DIFF WBC: CPT

## 2025-01-19 PROCEDURE — 80048 BASIC METABOLIC PNL TOTAL CA: CPT

## 2025-01-19 PROCEDURE — NC001 PR NO CHARGE: Performed by: ORTHOPAEDIC SURGERY

## 2025-01-19 RX ADMIN — SODIUM CHLORIDE, SODIUM GLUCONATE, SODIUM ACETATE, POTASSIUM CHLORIDE, MAGNESIUM CHLORIDE, SODIUM PHOSPHATE, DIBASIC, AND POTASSIUM PHOSPHATE 84 ML/HR: .53; .5; .37; .037; .03; .012; .00082 INJECTION, SOLUTION INTRAVENOUS at 23:23

## 2025-01-19 RX ADMIN — BISACODYL 10 MG: 10 SUPPOSITORY RECTAL at 08:22

## 2025-01-19 RX ADMIN — BENZTROPINE MESYLATE 1 MG: 1 TABLET ORAL at 17:54

## 2025-01-19 RX ADMIN — METHOCARBAMOL 500 MG: 500 TABLET ORAL at 00:23

## 2025-01-19 RX ADMIN — METHOCARBAMOL 500 MG: 500 TABLET ORAL at 17:54

## 2025-01-19 RX ADMIN — METHOCARBAMOL 500 MG: 500 TABLET ORAL at 11:46

## 2025-01-19 RX ADMIN — METHOCARBAMOL 500 MG: 500 TABLET ORAL at 23:18

## 2025-01-19 RX ADMIN — SODIUM CHLORIDE, SODIUM GLUCONATE, SODIUM ACETATE, POTASSIUM CHLORIDE, MAGNESIUM CHLORIDE, SODIUM PHOSPHATE, DIBASIC, AND POTASSIUM PHOSPHATE 84 ML/HR: .53; .5; .37; .037; .03; .012; .00082 INJECTION, SOLUTION INTRAVENOUS at 10:50

## 2025-01-19 RX ADMIN — BUSPIRONE HYDROCHLORIDE 15 MG: 10 TABLET ORAL at 08:23

## 2025-01-19 RX ADMIN — PANTOPRAZOLE SODIUM 40 MG: 40 TABLET, DELAYED RELEASE ORAL at 05:51

## 2025-01-19 RX ADMIN — AMLODIPINE BESYLATE 2.5 MG: 2.5 TABLET ORAL at 08:22

## 2025-01-19 RX ADMIN — CEFTRIAXONE SODIUM 2000 MG: 10 INJECTION, POWDER, FOR SOLUTION INTRAVENOUS at 09:55

## 2025-01-19 RX ADMIN — HYDROXYZINE HYDROCHLORIDE 25 MG: 25 TABLET, FILM COATED ORAL at 08:22

## 2025-01-19 RX ADMIN — LIDOCAINE 1 PATCH: 700 PATCH TOPICAL at 21:12

## 2025-01-19 RX ADMIN — PRAZOSIN HYDROCHLORIDE 2 MG: 2 CAPSULE ORAL at 21:13

## 2025-01-19 RX ADMIN — PREGABALIN 150 MG: 75 CAPSULE ORAL at 08:23

## 2025-01-19 RX ADMIN — BENZTROPINE MESYLATE 1 MG: 1 TABLET ORAL at 08:23

## 2025-01-19 RX ADMIN — PREGABALIN 150 MG: 75 CAPSULE ORAL at 15:30

## 2025-01-19 RX ADMIN — SENNOSIDES AND DOCUSATE SODIUM 1 TABLET: 50; 8.6 TABLET ORAL at 21:13

## 2025-01-19 RX ADMIN — PANTOPRAZOLE SODIUM 40 MG: 40 TABLET, DELAYED RELEASE ORAL at 15:31

## 2025-01-19 RX ADMIN — BUSPIRONE HYDROCHLORIDE 15 MG: 10 TABLET ORAL at 15:30

## 2025-01-19 RX ADMIN — ACETAMINOPHEN 975 MG: 325 TABLET, FILM COATED ORAL at 13:19

## 2025-01-19 RX ADMIN — ACETAMINOPHEN 975 MG: 325 TABLET, FILM COATED ORAL at 21:13

## 2025-01-19 RX ADMIN — BUSPIRONE HYDROCHLORIDE 15 MG: 10 TABLET ORAL at 21:13

## 2025-01-19 RX ADMIN — ASPIRIN 81 MG: 81 TABLET, COATED ORAL at 08:22

## 2025-01-19 RX ADMIN — ARIPIPRAZOLE 10 MG: 10 TABLET ORAL at 08:23

## 2025-01-19 RX ADMIN — ENOXAPARIN SODIUM 40 MG: 40 INJECTION SUBCUTANEOUS at 08:22

## 2025-01-19 RX ADMIN — TRAZODONE HYDROCHLORIDE 300 MG: 100 TABLET ORAL at 21:13

## 2025-01-19 RX ADMIN — HYDROXYZINE HYDROCHLORIDE 25 MG: 25 TABLET, FILM COATED ORAL at 21:13

## 2025-01-19 RX ADMIN — HYDROXYZINE HYDROCHLORIDE 25 MG: 25 TABLET, FILM COATED ORAL at 15:30

## 2025-01-19 RX ADMIN — METHOCARBAMOL 500 MG: 500 TABLET ORAL at 05:51

## 2025-01-19 RX ADMIN — PREGABALIN 150 MG: 75 CAPSULE ORAL at 21:13

## 2025-01-19 RX ADMIN — OXYCODONE HYDROCHLORIDE 10 MG: 10 TABLET ORAL at 13:19

## 2025-01-19 RX ADMIN — ACETAMINOPHEN 975 MG: 325 TABLET, FILM COATED ORAL at 05:51

## 2025-01-19 RX ADMIN — ATORVASTATIN CALCIUM 40 MG: 40 TABLET, FILM COATED ORAL at 15:30

## 2025-01-19 RX ADMIN — OXYCODONE HYDROCHLORIDE 10 MG: 10 TABLET ORAL at 20:09

## 2025-01-19 RX ADMIN — SERTRALINE HYDROCHLORIDE 200 MG: 100 TABLET ORAL at 21:13

## 2025-01-19 NOTE — QUICK NOTE
Patient was not personally seen or examined today.  Chart thoroughly reviewed including vital signs, laboratory results, progress notes.  General surgery is primary and managing SBO. APS following and managing pain regimen. ID following and managing antibiotic. Patient has been cleared for discharge from orthopedic standpoint. No changes to medical management at this time, SLIM will continue to follow peripherally.  Please call with questions or concerns.

## 2025-01-19 NOTE — ASSESSMENT & PLAN NOTE
S/p ex lap, ALLISON of obstructive pelvic adhesions.  Operative findings suggestive of adhesive distal obstruction and possible closed-loop obstruction.  She says she feels more bloated today, with distention noted on exam    AVSS on room air  1.1 L of urine outpt    Plan:  -continue on CTC  -Maintain PICC  -Continue isolye@84cc  -Continue antibiotics through 12/21  -Appreciate APS recommendations  -PT OT: Level 2  -DVT prophylaxis

## 2025-01-19 NOTE — PROGRESS NOTES
Progress Note - Surgery-General   Name: Samantha Rodriguez 59 y.o. female I MRN: [id number]  Unit/Bed#: [facility] 615-01 I Date of Admission: 1/9/2025   Date of Service: 1/19/2025 I Hospital Day: 10    Assessment & Plan  Small bowel obstruction due to adhesions (HCC)  S/p ex lap, ALLISON of obstructive pelvic adhesions.  Operative findings suggestive of adhesive distal obstruction and possible closed-loop obstruction.  She says she feels more bloated today, with distention noted on exam    AVSS on room air  1.1 L of urine outpt    Plan:  -continue on CTC  -Maintain PICC  -Continue isolye@84cc  -Continue antibiotics through 12/21  -Appreciate APS recommendations  -PT OT: Level 2  -DVT prophylaxis          Subjective   No acute events.  Had an episode of nausea and x 1 small-volume emesis but feels better this morning.  She is passing flatus and had 2 small bowel movements.  He says her pain is otherwise well-controlled    Objective :  Temp:  [97.8 °F (36.6 °C)-98 °F (36.7 °C)] 97.8 °F (36.6 °C)  HR:  [66-77] 66  BP: (117-119)/(74-76) 117/74  Resp:  [17-18] 17  SpO2:  [97 %-100 %] 97 %  O2 Device: None (Room air)    I/O         01/17 0701  01/18 0700 01/18 0701  01/19 0700 01/19 0701  01/20 0700    P.O. 340 200     I.V. (mL/kg) 588 (5.9) 1332.2 (13.4)     IV Piggyback 50 50     Total Intake(mL/kg) 978 (9.9) 1582.2 (15.9)     Urine (mL/kg/hr) 975 (0.4) 1100 (0.5)     Emesis/NG output  0     Drains 0 0     Stool 0 0     Total Output 975 1100     Net +3 +482.2            Unmeasured Urine Occurrence  3 x     Unmeasured Stool Occurrence 1 x 2 x     Unmeasured Emesis Occurrence  1 x           Lines/Drains/Airways       Active Status       Name Placement date Placement time Site Days    PICC Line 01/13/25 Left Basilic 01/13/25  1118  Basilic  5                  Physical Exam  Constitutional:       General: She is not in acute distress.     Appearance: Normal appearance. She is not toxic-appearing.   HENT:      Head: Normocephalic.       Mouth/Throat:      Mouth: Mucous membranes are moist.   Eyes:      Extraocular Movements: Extraocular movements intact.      Pupils: Pupils are equal, round, and reactive to light.   Cardiovascular:      Rate and Rhythm: Normal rate.   Pulmonary:      Effort: Pulmonary effort is normal.   Abdominal:      General: There is no distension.      Palpations: Abdomen is soft. There is no mass.      Tenderness: There is abdominal tenderness. There is no guarding or rebound.      Comments: Post surgical tenderness, incisions c/d/i   Skin:     General: Skin is warm.   Neurological:      General: No focal deficit present.      Mental Status: She is alert and oriented to person, place, and time.           Lab Results: I have reviewed the following results:  Recent Labs     01/16/25  1018 01/17/25  0500 01/18/25  0512 01/19/25  0556   WBC 7.99   < > 6.14 5.70   HGB 7.9*   < > 8.0* 8.2*   HCT 26.0*   < > 26.8* 27.6*   *   < > 448* 465*   SODIUM 139   < > 142 141   K 3.1*   < > 3.7 3.9      < > 105 106   CO2 28   < > 29 29   BUN 6   < > 4* 3*   CREATININE 0.53*   < > 0.50* 0.50*   GLUC 109   < > 91 85   MG 1.9  --   --   --    PHOS 3.3  --   --   --    AST 11*  --  11*  --    ALT 4*  --  4*  --    ALB 2.9*  --  2.9*  --    TBILI 0.55  --  0.50  --    ALKPHOS 91  --  111*  --     < > = values in this interval not displayed.             VTE Pharmacologic Prophylaxis: VTE covered by:  enoxaparin, Subcutaneous, 40 mg at 01/18/25 0806     VTE Mechanical Prophylaxis: sequential compression device

## 2025-01-19 NOTE — ASSESSMENT & PLAN NOTE
Samantha is a 60 y/o F presenting 4 weeks s/p  weeks s/p removal hardware L2-S1, posterior fusion L5-S1, revision instrumentation L2-pelvis, bilateral S2 alar screws with Dr. Bills and Dr. Stevens (DOS 12/19/24) with concern for wound dehiscence.      Now s/p I&D lumbar spine. Doing well. OR cultures growing proteus in both the deep and superficial cultures. ID changed abx to IV ceftriaxone pending suseptibilities. ID recommending 6wks IV abx. Pt taken for ex lap for obstruction, continues to feel bloated. Both drains have been removed. Prevena applied to incision. Orthopedically stable for discharge.    Plan:     WBAT all extremities  Continue Prevena   Regular diet  Continue abx per ID  ID consulted, appreciate recs   Picc line placement and 6wks iv ceftriaxone  Multimodal pain control  PT/OT

## 2025-01-19 NOTE — PROGRESS NOTES
Progress Note - Orthopedics   Name: Samantha Rodriguez 59 y.o. female I MRN: [id number]  [facility] Date of Admission: 1/9/2025   Date of Service: 1/19/2025 I Hospital Day: 10    Assessment & Plan  Wound infection complicating hardware (HCC)  Samantha is a 58 y/o F presenting 4 weeks s/p  weeks s/p removal hardware L2-S1, posterior fusion L5-S1, revision instrumentation L2-pelvis, bilateral S2 alar screws with Dr. Bills and Dr. Stevens (DOS 12/19/24) with concern for wound dehiscence.      Now s/p I&D lumbar spine. Doing well. OR cultures growing proteus in both the deep and superficial cultures. ID changed abx to IV ceftriaxone pending suseptibilities. ID recommending 6wks IV abx. Pt taken for ex lap for obstruction, continues to feel bloated. Both drains have been removed. Prevena applied to incision. Orthopedically stable for discharge.    Plan:     WBAT all extremities  Continue Prevena   Regular diet  Continue abx per ID  ID consulted, appreciate recs   Picc line placement and 6wks iv ceftriaxone  Multimodal pain control  PT/OT  Lumbar radiculopathy  See above  Class 2 obesity without serious comorbidity with body mass index (BMI) of 39.0 to 39.9 in adult      Subjective   59 y.o.female doing well. No acute events, no new complaints. Pain well controlled. Denies fevers, chills, CP, SOB, N/V, numbness or tingling. Patient reports no issues with urination.    Objective :  Temp:  [97.8 °F (36.6 °C)-98 °F (36.7 °C)] 98 °F (36.7 °C)  HR:  [66-84] 84  BP: (113-119)/(74-76) 113/74  Resp:  [16-18] 16  SpO2:  [94 %-100 %] 94 %  O2 Device: None (Room air)  Physical Exam   Musculoskeletal: bilateral lower  Skin intact . No erythema or ecchymosis.  Dressing c/d/i  TTP sona-incisional   Motor intact to +FHL/EHL, +ankle dorsi/plantar flexion  2+ DP pulse  Sensation intact L3-S1  Motor intact L3-S1  Prevena in place.    Lab Results: I have reviewed the following results:  Recent Labs     01/17/25  0318  "01/18/25  0512 01/19/25  0556   WBC 5.61 6.14 5.70   HGB 7.6* 8.0* 8.2*   HCT 24.8* 26.8* 27.6*   * 448* 465*   BUN 5 4* 3*   CREATININE 0.54* 0.50* 0.50*   CRP  --  79.7*  --      Blood Culture:    Lab Results   Component Value Date    BLOODCX No Growth After 5 Days. 01/03/2025    BLOODCX No Growth After 5 Days. 01/03/2025     Wound Culture: No results found for: \"WOUNDCULT\"  "

## 2025-01-20 LAB
ANION GAP SERPL CALCULATED.3IONS-SCNC: 7 MMOL/L (ref 4–13)
BASOPHILS # BLD AUTO: 0.03 THOUSANDS/ΜL (ref 0–0.1)
BASOPHILS NFR BLD AUTO: 1 % (ref 0–1)
BUN SERPL-MCNC: 5 MG/DL (ref 5–25)
CALCIUM SERPL-MCNC: 7.9 MG/DL (ref 8.4–10.2)
CHLORIDE SERPL-SCNC: 105 MMOL/L (ref 96–108)
CO2 SERPL-SCNC: 29 MMOL/L (ref 21–32)
CREAT SERPL-MCNC: 0.5 MG/DL (ref 0.6–1.3)
EOSINOPHIL # BLD AUTO: 0.32 THOUSAND/ΜL (ref 0–0.61)
EOSINOPHIL NFR BLD AUTO: 6 % (ref 0–6)
ERYTHROCYTE [DISTWIDTH] IN BLOOD BY AUTOMATED COUNT: 15.7 % (ref 11.6–15.1)
GFR SERPL CREATININE-BSD FRML MDRD: 104 ML/MIN/1.73SQ M
GLUCOSE SERPL-MCNC: 104 MG/DL (ref 65–140)
HCT VFR BLD AUTO: 27.1 % (ref 34.8–46.1)
HGB BLD-MCNC: 7.9 G/DL (ref 11.5–15.4)
IMM GRANULOCYTES # BLD AUTO: 0.03 THOUSAND/UL (ref 0–0.2)
IMM GRANULOCYTES NFR BLD AUTO: 1 % (ref 0–2)
LYMPHOCYTES # BLD AUTO: 0.94 THOUSANDS/ΜL (ref 0.6–4.47)
LYMPHOCYTES NFR BLD AUTO: 16 % (ref 14–44)
MAGNESIUM SERPL-MCNC: 1.9 MG/DL (ref 1.9–2.7)
MCH RBC QN AUTO: 26.2 PG (ref 26.8–34.3)
MCHC RBC AUTO-ENTMCNC: 29.2 G/DL (ref 31.4–37.4)
MCV RBC AUTO: 90 FL (ref 82–98)
MONOCYTES # BLD AUTO: 0.47 THOUSAND/ΜL (ref 0.17–1.22)
MONOCYTES NFR BLD AUTO: 8 % (ref 4–12)
NEUTROPHILS # BLD AUTO: 3.93 THOUSANDS/ΜL (ref 1.85–7.62)
NEUTS SEG NFR BLD AUTO: 68 % (ref 43–75)
NRBC BLD AUTO-RTO: 0 /100 WBCS
PHOSPHATE SERPL-MCNC: 3.4 MG/DL (ref 2.3–4.1)
PLATELET # BLD AUTO: 451 THOUSANDS/UL (ref 149–390)
PMV BLD AUTO: 9.3 FL (ref 8.9–12.7)
POTASSIUM SERPL-SCNC: 3.6 MMOL/L (ref 3.5–5.3)
RBC # BLD AUTO: 3.01 MILLION/UL (ref 3.81–5.12)
SODIUM SERPL-SCNC: 141 MMOL/L (ref 135–147)
WBC # BLD AUTO: 5.72 THOUSAND/UL (ref 4.31–10.16)

## 2025-01-20 PROCEDURE — 83735 ASSAY OF MAGNESIUM: CPT

## 2025-01-20 PROCEDURE — NC001 PR NO CHARGE: Performed by: ORTHOPAEDIC SURGERY

## 2025-01-20 PROCEDURE — 99024 POSTOP FOLLOW-UP VISIT: CPT | Performed by: SURGERY

## 2025-01-20 PROCEDURE — G0545 PR INHERENT VISIT TO INPT: HCPCS | Performed by: INTERNAL MEDICINE

## 2025-01-20 PROCEDURE — 84100 ASSAY OF PHOSPHORUS: CPT

## 2025-01-20 PROCEDURE — 97116 GAIT TRAINING THERAPY: CPT

## 2025-01-20 PROCEDURE — 97112 NEUROMUSCULAR REEDUCATION: CPT

## 2025-01-20 PROCEDURE — 80048 BASIC METABOLIC PNL TOTAL CA: CPT

## 2025-01-20 PROCEDURE — 97530 THERAPEUTIC ACTIVITIES: CPT

## 2025-01-20 PROCEDURE — 99232 SBSQ HOSP IP/OBS MODERATE 35: CPT | Performed by: INTERNAL MEDICINE

## 2025-01-20 PROCEDURE — 85025 COMPLETE CBC W/AUTO DIFF WBC: CPT

## 2025-01-20 RX ADMIN — BENZTROPINE MESYLATE 1 MG: 1 TABLET ORAL at 18:12

## 2025-01-20 RX ADMIN — ACETAMINOPHEN 975 MG: 325 TABLET, FILM COATED ORAL at 14:08

## 2025-01-20 RX ADMIN — BENZTROPINE MESYLATE 1 MG: 1 TABLET ORAL at 10:02

## 2025-01-20 RX ADMIN — PRAZOSIN HYDROCHLORIDE 2 MG: 2 CAPSULE ORAL at 21:29

## 2025-01-20 RX ADMIN — ASPIRIN 81 MG: 81 TABLET, COATED ORAL at 10:02

## 2025-01-20 RX ADMIN — PANTOPRAZOLE SODIUM 40 MG: 40 TABLET, DELAYED RELEASE ORAL at 18:12

## 2025-01-20 RX ADMIN — ATORVASTATIN CALCIUM 40 MG: 40 TABLET, FILM COATED ORAL at 18:12

## 2025-01-20 RX ADMIN — PREGABALIN 150 MG: 75 CAPSULE ORAL at 10:02

## 2025-01-20 RX ADMIN — CEFTRIAXONE SODIUM 2000 MG: 10 INJECTION, POWDER, FOR SOLUTION INTRAVENOUS at 10:33

## 2025-01-20 RX ADMIN — BUSPIRONE HYDROCHLORIDE 15 MG: 10 TABLET ORAL at 21:24

## 2025-01-20 RX ADMIN — ONDANSETRON 4 MG: 2 INJECTION INTRAMUSCULAR; INTRAVENOUS at 05:54

## 2025-01-20 RX ADMIN — PANTOPRAZOLE SODIUM 40 MG: 40 TABLET, DELAYED RELEASE ORAL at 05:48

## 2025-01-20 RX ADMIN — SENNOSIDES AND DOCUSATE SODIUM 1 TABLET: 50; 8.6 TABLET ORAL at 21:24

## 2025-01-20 RX ADMIN — METHOCARBAMOL 500 MG: 500 TABLET ORAL at 12:02

## 2025-01-20 RX ADMIN — PREGABALIN 150 MG: 75 CAPSULE ORAL at 21:24

## 2025-01-20 RX ADMIN — METHOCARBAMOL 500 MG: 500 TABLET ORAL at 05:48

## 2025-01-20 RX ADMIN — OXYCODONE HYDROCHLORIDE 10 MG: 10 TABLET ORAL at 10:17

## 2025-01-20 RX ADMIN — METHOCARBAMOL 500 MG: 500 TABLET ORAL at 18:12

## 2025-01-20 RX ADMIN — AMLODIPINE BESYLATE 2.5 MG: 2.5 TABLET ORAL at 10:02

## 2025-01-20 RX ADMIN — PREGABALIN 150 MG: 75 CAPSULE ORAL at 18:12

## 2025-01-20 RX ADMIN — HYDROXYZINE HYDROCHLORIDE 25 MG: 25 TABLET, FILM COATED ORAL at 10:03

## 2025-01-20 RX ADMIN — ENOXAPARIN SODIUM 40 MG: 40 INJECTION SUBCUTANEOUS at 10:04

## 2025-01-20 RX ADMIN — HYDROXYZINE HYDROCHLORIDE 25 MG: 25 TABLET, FILM COATED ORAL at 18:12

## 2025-01-20 RX ADMIN — ARIPIPRAZOLE 10 MG: 10 TABLET ORAL at 10:04

## 2025-01-20 RX ADMIN — ACETAMINOPHEN 975 MG: 325 TABLET, FILM COATED ORAL at 05:48

## 2025-01-20 RX ADMIN — OXYCODONE HYDROCHLORIDE 10 MG: 10 TABLET ORAL at 21:30

## 2025-01-20 RX ADMIN — HYDROXYZINE HYDROCHLORIDE 25 MG: 25 TABLET, FILM COATED ORAL at 21:24

## 2025-01-20 RX ADMIN — BUSPIRONE HYDROCHLORIDE 15 MG: 10 TABLET ORAL at 18:12

## 2025-01-20 RX ADMIN — ACETAMINOPHEN 975 MG: 325 TABLET, FILM COATED ORAL at 21:24

## 2025-01-20 RX ADMIN — SERTRALINE HYDROCHLORIDE 200 MG: 100 TABLET ORAL at 21:25

## 2025-01-20 RX ADMIN — TRAZODONE HYDROCHLORIDE 300 MG: 100 TABLET ORAL at 21:30

## 2025-01-20 RX ADMIN — BUSPIRONE HYDROCHLORIDE 15 MG: 10 TABLET ORAL at 10:02

## 2025-01-20 NOTE — OCCUPATIONAL THERAPY NOTE
Occupational Therapy Treatment Note:      01/20/25 1415   OT Last Visit   OT Visit Date 01/20/25   Note Type   Note Type Treatment   Pain Assessment   Pain Assessment Tool FLACC   Pain Rating: FLACC (Rest) - Face 0   Pain Rating: FLACC (Rest) - Legs 0   Pain Rating: FLACC (Rest) - Activity 0   Pain Rating: FLACC (Rest) - Cry 0   Pain Rating: FLACC (Rest) - Consolability 0   Score: FLACC (Rest) 0   Pain Rating: FLACC (Activity) - Face 0   Pain Rating: FLACC (Activity) - Legs 0   Pain Rating: FLACC (Activity) - Activity 0   Pain Rating: FLACC (Activity) - Cry 0   Pain Rating: FLACC (Activity) - Consolability 0   Score: FLACC (Activity) 0   Restrictions/Precautions   Braces or Orthoses LSO   Other Precautions Chair Alarm;Bed Alarm;Cognitive   ADL   Grooming Assistance 5  Supervision/Setup   Grooming Comments in stance sinkside   UB Dressing Assistance 4  Minimal Assistance   LB Dressing Assistance 2  Maximal Assistance   Functional Standing Tolerance   Time stood x 1 min for hand washing, f+ balance c rw   Transfers   Sit to Stand 4  Minimal assistance   Stand to Sit 4  Minimal assistance   Functional Mobility   Functional Mobility 4  Minimal assistance   Cognition   Overall Cognitive Status Impaired   Activity Tolerance   Activity Tolerance Patient tolerated treatment well   Assessment   Assessment pt participated in pm of session and was seen focusing on  adl tasks, lso donning and functional transfers and mobility. pt required cga for balance during task. pt was more talkative  and interactive this session   Plan   Treatment Interventions ADL retraining;Functional transfer training;Endurance training;Patient/family training;Equipment evaluation/education;Activityengagement   Goal Expiration Date 01/29/25   OT Treatment Day 3   OT Frequency 2-3x/wk   Discharge Recommendation   Rehab Resource Intensity Level, OT I (Maximum Resource Intensity)   AM-PAC Daily Activity Inpatient   Lower Body Dressing 2   Bathing 2    Toileting 2   Upper Body Dressing 2   Grooming 3   Eating 3   Daily Activity Raw Score 14   Daily Activity Standardized Score (Calc for Raw Score >=11) 33.39   AM-PAC Applied Cognition Inpatient   Following a Speech/Presentation 3   Understanding Ordinary Conversation 4   Taking Medications 3   Remembering Where Things Are Placed or Put Away 3   Remembering List of 4-5 Errands 3   Taking Care of Complicated Tasks 3   Applied Cognition Raw Score 19   Applied Cognition Standardized Score 39.77     April A Storm

## 2025-01-20 NOTE — PROGRESS NOTES
Progress Note - Infectious Disease   Name: Samantha Rodriguez 59 y.o. female I MRN: [id number]  Unit/Bed#: [facility] 615-01 I Date of Admission: 1/9/2025   Date of Service: 1/20/2025 I Hospital Day: 11    Assessment & Plan  Wound infection complicating hardware (HCC)  Presenting with delayed wound dehiscence in setting of recent fusion surgery as below.  Status post I&D 1/10/25.  Intraoperative findings notable for wound dehiscence to level of fascia which appeared intact.  Fascia was opened and deep space was explored.  Superficial and deep cultures grew Proteus.  Concern for HW involvement.    Continue ceftriaxone 2g IV Q24 through 2/21/25 to complete 6 weeks postop given concern for HW infection  Monitor closely while on antibiotics for toxicities including diarrhea, rash, cytopenias, or kidney injury  Check weekly CBC-diff, CMP while on IV antibiotics to monitor for toxicities.  D/C planning for STR  Outpatient follow-up with ID 2 weeks after D/C  D/C PICC after last dose of IV antibiotics  Given retained HW, will likely need suppressive antibiotics after IV course complete  Lumbar radiculopathy  Remote fusion L2-S1.  Underwent removal hardware L2-S1, posterior fusion L5-S1, revision instrumentation L2 to pelvis; application of bilateral S2 alar screws and bone grafting of the disc at L5-S1 12/19/24.  Complicated by infection as above.  RSV bronchitis  Patient tested positive on 1/3/25.  Mild URI symptoms, no O2 requirement.  Improved, off isolation.  Small bowel obstruction due to adhesions (HCC)  Recurrent.  Initially managed with NGT.  Now status post XLap, ALLISON 1/14.  Patient is clinically improved.  She has flatus and BM and tolerating diet.    Postoperative care per Surgery    Discussed with patient in detail regarding the above plan.  Okay for discharge from ID viewpoint  Follow-up with us after discharge.      Antibiotics:  Ceftriaxone  POD #9    Subjective   Patient still has some abdominal bloating and  discomfort, but improving.    She has flatus.  She had watery BM.  She is tolerating diet reasonably well.  Back pain mild and controlled.  Temperature stays down.  No chills.    Objective :  Temp:  [98 °F (36.7 °C)-98.9 °F (37.2 °C)] 98.8 °F (37.1 °C)  HR:  [67-96] 84  BP: (113-133)/(67-80) 133/80  Resp:  [16-18] 18  SpO2:  [94 %-99 %] 99 %  O2 Device: None (Room air)    General:  No acute distress  Psychiatric:  Awake and alert  Pulmonary:  Normal respiratory excursion without accessory muscle use  Abdomen:  Soft, mild distention, mild diffuse tenderness, bowel sounds present  Extremities: 1+ leg edema, no draining wound, no erythema/warmth, nontender  Skin:  No rashes      Lab Results: I have reviewed the following results:  Results from last 7 days   Lab Units 01/20/25  0549 01/19/25  0556 01/18/25  0512   WBC Thousand/uL 5.72 5.70 6.14   HEMOGLOBIN g/dL 7.9* 8.2* 8.0*   PLATELETS Thousands/uL 451* 465* 448*     Results from last 7 days   Lab Units 01/20/25  0549 01/19/25  0556 01/18/25  0512 01/17/25  0500 01/16/25  1018   SODIUM mmol/L 141 141 142   < > 139   POTASSIUM mmol/L 3.6 3.9 3.7   < > 3.1*   CHLORIDE mmol/L 105 106 105   < > 103   CO2 mmol/L 29 29 29   < > 28   BUN mg/dL 5 3* 4*   < > 6   CREATININE mg/dL 0.50* 0.50* 0.50*   < > 0.53*   EGFR ml/min/1.73sq m 104 104 104   < > 102   CALCIUM mg/dL 7.9* 7.9* 8.0*   < > 7.7*   AST U/L  --   --  11*  --  11*   ALT U/L  --   --  4*  --  4*   ALK PHOS U/L  --   --  111*  --  91   ALBUMIN g/dL  --   --  2.9*  --  2.9*    < > = values in this interval not displayed.             Results from last 7 days   Lab Units 01/18/25  0512   CRP mg/L 79.7*

## 2025-01-20 NOTE — ASSESSMENT & PLAN NOTE
S/p ex lap, ALLISON of obstructive pelvic adhesions.  Operative findings suggestive of adhesive distal obstruction and possible closed-loop obstruction.      Plan:  -Regular diet as tolerated  -Maintain PICC  -disontinue isolye@84cc  -Continue antibiotics through 12/21 w/ PICC  -Appreciate APS recommendations  -cleared for d/c from orthopedic stand point; appreciate recs  -proveena management per ortho  -PT OT: Level 2  -DVT prophylaxis

## 2025-01-20 NOTE — PROGRESS NOTES
Progress Note - Surgery-General   Name: Samantha Rodriguez 59 y.o. female I MRN: [id number]  Unit/Bed#: [facility] 615-01 I Date of Admission: 1/9/2025   Date of Service: 1/19/2025 I Hospital Day: 10  { ?Quick Links I Problem List I PORCH I Billing Tip:88151}  Assessment & Plan  Small bowel obstruction due to adhesions (HCC)  S/p ex lap, ALLISON of obstructive pelvic adhesions.  Operative findings suggestive of adhesive distal obstruction and possible closed-loop obstruction.      Plan:  -Regular diet as tolerated  -Maintain PICC  -disontinue berna@[facility]  -Continue antibiotics through 12/21 w/ PICC  -Appreciate APS recommendations  -cleared for d/c from orthopedic stand point; appreciate recs  -proveena management per ortho  -PT OT: Level 2  -DVT prophylaxis      {MDM/Admin Statements (Optional):16512}    Subjective   ***    Objective :{?Quick Links I ICU Summary I Vitals I I/Os I LDAs I Mobility (PT/OT) I Code Status / ACP   ?Quick Links I Active Meds I Pain Meds I Antibiotics I Anticoagulants:38900}  Temp:  [97.8 °F (36.6 °C)-98.9 °F (37.2 °C)] 98.9 °F (37.2 °C)  HR:  [66-96] 67  BP: (113-126)/(67-74) 126/67  Resp:  [16-17] 16  SpO2:  [94 %-98 %] 97 %  O2 Device: None (Room air)    I/O         01/18 0701  01/19 0700 01/19 0701  01/20 0700    P.O. 200 720    I.V. (mL/kg) 1332.2 (13.4) 1682.4 (17)    IV Piggyback 50 50    Total Intake(mL/kg) 1582.2 (15.9) 2452.4 (24.7)    Urine (mL/kg/hr) 1100 (0.5) 1400 (1)    Emesis/NG output 0     Drains 0 0    Stool 0 0    Total Output 1100 1400    Net +482.2 +1052.4          Unmeasured Urine Occurrence 3 x     Unmeasured Stool Occurrence 2 x 2 x    Unmeasured Emesis Occurrence 1 x           Lines/Drains/Airways       Active Status       Name Placement date Placement time Site Days    PICC Line 01/13/25 Left Basilic 01/13/25  1118  Basilic  6                  Physical Exam  Constitutional:       General: She is not in acute distress.     Appearance: Normal appearance. She is not  "toxic-appearing.   HENT:      Head: Normocephalic.      Mouth/Throat:      Mouth: Mucous membranes are moist.   Eyes:      Extraocular Movements: Extraocular movements intact.      Pupils: Pupils are equal, round, and reactive to light.   Cardiovascular:      Rate and Rhythm: Normal rate.   Pulmonary:      Effort: Pulmonary effort is normal.   Abdominal:      General: There is no distension.      Palpations: Abdomen is soft. There is no mass.      Tenderness: There is abdominal tenderness. There is no guarding or rebound.      Comments: Post surgical tenderness, incisions c/d/i   Skin:     General: Skin is warm.   Neurological:      General: No focal deficit present.      Mental Status: She is alert and oriented to person, place, and time.       {?Quick Links I Lab Review I Micro Results I Radiology I Cardiology:95765}  Lab Results: I have reviewed the following results:  Recent Labs     01/18/25  0512 01/19/25  0556   WBC 6.14 5.70   HGB 8.0* 8.2*   HCT 26.8* 27.6*   * 465*   SODIUM 142 141   K 3.7 3.9    106   CO2 29 29   BUN 4* 3*   CREATININE 0.50* 0.50*   GLUC 91 85   AST 11*  --    ALT 4*  --    ALB 2.9*  --    TBILI 0.50  --    ALKPHOS 111*  --        {Imaging Results Review:07060::\"No pertinent imaging studies reviewed.\"}  {Other Study Results Review:65954::\"No additional pertinent studies reviewed.\"}    VTE Pharmacologic Prophylaxis: VTE covered by:  enoxaparin, Subcutaneous, 40 mg at 01/19/25 0822     VTE Mechanical Prophylaxis: sequential compression device  "

## 2025-01-20 NOTE — PHYSICAL THERAPY NOTE
Physical Therapy Progress Note     01/20/25 1300   PT Last Visit   PT Visit Date 01/20/25   Note Type   Note Type Treatment for insurance authorization   Pain Assessment   Pain Assessment Tool 0-10   Pain Score 5   Pain Location/Orientation Location: Back   Hospital Pain Intervention(s) Repositioned;Ambulation/increased activity;Emotional support   Restrictions/Precautions   Braces or Orthoses LSO   Other Precautions Chair Alarm;Bed Alarm;Pain;Fall Risk;Spinal precautions  (Alarm active post session.)   Subjective   Subjective The patient notes that she is feeling a little better.   Transfers   Sit to Stand 4  Minimal assistance   Additional items Assist x 1;Verbal cues   Stand to Sit 4  Minimal assistance   Additional items Assist x 1;Verbal cues   Ambulation/Elevation   Gait pattern Excessively slow;Step to;Short stride;Inconsistent zack;Decreased foot clearance;Forward Flexion   Gait Assistance 4  Minimal assist   Additional items Assist x 1;Verbal cues;Tactile cues   Assistive Device Rolling walker   Distance 10 feet x 2, 80 feet x 2.   Balance   Static Sitting Fair   Dynamic Sitting Fair -   Static Standing Fair -   Dynamic Standing Fair -   Ambulatory Poor +   Activity Tolerance   Activity Tolerance Patient tolerated treatment well;Patient limited by fatigue   Exercises   Knee AROM Long Arc Quad Sitting;10 reps;Bilateral;AROM   Assessment   Prognosis Good   Problem List Decreased strength;Decreased endurance;Impaired balance;Decreased mobility;Pain;Orthopedic restrictions   Assessment The patient is very alert and interactive today which translated to improved mobility. She was assisted to and from the bathroom, and then she was able to ambulate down the juan later. She noted that her LSO was large, and removed the expansion panel with improvement for the patient's comfort and support. Deficits continued to remain in balance, strength, and activity tolerance which continued therapies are indicated to  maximize her functional mobility and independence.   Goals   Patient Goals To get stronger.   STG Expiration Date 01/29/25   PT Treatment Day 1   Plan   Treatment/Interventions Functional transfer training;LE strengthening/ROM;Therapeutic exercise;Endurance training;Cognitive reorientation;Patient/family training;Bed mobility;Gait training   Progress Progressing toward goals   PT Frequency 3-5x/wk   Discharge Recommendation   Rehab Resource Intensity Level, PT II (Moderate Resource Intensity)   AM-PAC Basic Mobility Inpatient   Turning in Flat Bed Without Bedrails 3   Lying on Back to Sitting on Edge of Flat Bed Without Bedrails 3   Moving Bed to Chair 3   Standing Up From Chair Using Arms 3   Walk in Room 3   Climb 3-5 Stairs With Railing 2   Basic Mobility Inpatient Raw Score 17   Basic Mobility Standardized Score 39.67   [facility] Highest Level Of Mobility   -HLM Goal 5: Stand one or more mins   -HLM Achieved 7: Walk 25 feet or more         An AM-PAC Basic Mobility raw score less than 16 suggests the patient may benefit from discharge to post-acute rehab services.    Harpreet Almeida, PTA

## 2025-01-20 NOTE — PLAN OF CARE
Problem: OCCUPATIONAL THERAPY ADULT  Goal: Performs self-care activities at highest level of function for planned discharge setting.  See evaluation for individualized goals.  Description: Treatment Interventions: ADL retraining, Functional transfer training, Endurance training, Cognitive reorientation, Patient/family training, Equipment evaluation/education, Compensatory technique education, Energy conservation, Activityengagement          See flowsheet documentation for full assessment, interventions and recommendations.   Outcome: Progressing  Note: Limitation: Decreased ADL status, Decreased cognition, Decreased endurance, Decreased self-care trans, Decreased high-level ADLs  Prognosis: Good  Assessment: pt participated in pm of session and was seen focusing on  adl tasks, lso donning and functional transfers and mobility. pt required cga for balance during task. pt was more talkative  and interactive this session     Rehab Resource Intensity Level, OT: I (Maximum Resource Intensity)     April A Storm

## 2025-01-20 NOTE — CASE MANAGEMENT
Authorization submitted for [facility] bed at [facility]

## 2025-01-20 NOTE — PLAN OF CARE
Problem: PHYSICAL THERAPY ADULT  Goal: Performs mobility at highest level of function for planned discharge setting.  See evaluation for individualized goals.  Description: Treatment/Interventions: Functional transfer training, LE strengthening/ROM, Elevations, Therapeutic exercise, Endurance training, Patient/family training, Equipment eval/education, Bed mobility, Gait training, Spoke to nursing, OT          See flowsheet documentation for full assessment, interventions and recommendations.  Outcome: Progressing  Note: Prognosis: Good  Problem List: Decreased strength, Decreased endurance, Impaired balance, Decreased mobility, Pain, Orthopedic restrictions  Assessment: The patient is very alert and interactive today which translated to improved mobility. She was assisted to and from the bathroom, and then she was able to ambulate down the juan later. She noted that her LSO was large, and removed the expansion panel with improvement for the patient's comfort and support. Deficits continued to remain in balance, strength, and activity tolerance which continued therapies are indicated to maximize her functional mobility and independence.        Rehab Resource Intensity Level, PT: II (Moderate Resource Intensity)    See flowsheet documentation for full assessment.

## 2025-01-20 NOTE — QUICK NOTE
Patient was not personally seen or examined today.  Chart thoroughly reviewed including vital signs, laboratory results, progress notes.  General surgery is primary and managing SBO. APS following and managing pain regimen. ID following and managing antibiotic. Patient has been cleared for discharge from orthopedic standpoint. No changes to medical management at this time, would continue medication regimen as ordered. Discussed with surgery AP today, SLIM will sign off.  Please call back with questions or concerns.

## 2025-01-20 NOTE — ASSESSMENT & PLAN NOTE
Recurrent.  Initially managed with NGT.  Now status post XLap, ALLISON 1/14.  Patient is clinically improved.  She has flatus and BM and tolerating diet.    Postoperative care per Surgery

## 2025-01-20 NOTE — UTILIZATION REVIEW
Continued Stay Review    Date: 1/20/25                           Current Patient Class: Inpatient  Current Level of Care: MS    HPI:59 y.o. female initially admitted on 1/9/25 - DX:  Small bowel obstruction due to adhesions     Assessment/Plan:   1/20/25: POD # 9 S/p ex lap, ALLISON of obstructive pelvic adhesions.   Patient having some mild discomfort. Pain 9/10.  still has some abdominal bloating and discomfort, but improving. She continues to pass flatus. Heme 7.9  Continue antibiotics through 12/21 w/ PICC; PT/ OT recom rehab  Plan: cont ivf; cont iv abx; pain / nausea control (see below); CM following - placement; monitor labs        Medications:   Scheduled Medications:  acetaminophen, 975 mg, Oral, Q8H KESHIA  alteplase, 2 mg, Intracatheter, Once  amLODIPine, 2.5 mg, Oral, Daily  ARIPiprazole, 10 mg, Oral, Daily  aspirin, 81 mg, Oral, Daily  atorvastatin, 40 mg, Oral, Daily With Dinner  benztropine, 1 mg, Oral, BID  bisacodyl, 10 mg, Rectal, Daily  bupivacaine liposomal, 20 mL, Infiltration, Once  busPIRone, 15 mg, Oral, TID  cefTRIAXone, 2,000 mg, Intravenous, Q24H  enoxaparin, 40 mg, Subcutaneous, Daily  hydrOXYzine HCL, 25 mg, Oral, TID  lidocaine, 1 patch, Topical, Daily  [Held by provider] lubiprostone, 8 mcg, Oral, BID  methocarbamol, 500 mg, Oral, Q6H KESHIA  pantoprazole, 40 mg, Oral, BID AC  prazosin, 2 mg, Oral, HS  pregabalin, 150 mg, Oral, TID  senna-docusate sodium, 1 tablet, Oral, HS  sertraline, 200 mg, Oral, HS      Continuous IV Infusions: multi-electrolyte (PLASMALYTE-A/ISOLYTE-S PH 7.4) IV solution Rate: 84          PRN Meds:  albuterol, 2 puff, Inhalation, Q4H PRN  aluminum-magnesium hydroxide-simethicone, 30 mL, Oral, Q4H PRN  dextromethorphan-guaiFENesin, 10 mL, Oral, Q4H PRN  metoprolol, 5 mg, Intravenous, Q6H PRN  naloxone, 0.04 mg, Intravenous, Q1MIN PRN  ondansetron, 4 mg, Intravenous, Q6H PRN  (1/20 recd x1 so far today)   oxyCODONE, 5 mg, Oral, Q4H PRN   Or  oxyCODONE, 10 mg, Oral, Q4H PRN   (1/20 recd x1 so far today)   phenol, 1 spray, Mouth/Throat, Q2H PRN  pneumococcal 20-cheryl conj vacc, 0.5 mL, Intramuscular, Once PRN  traZODone, 300 mg, Oral, HS PRN  trimethobenzamide, 200 mg, Intramuscular, Q6H PRN      Discharge Plan: TBD    Vital Signs (last 3 days)       Date/Time Temp Pulse Resp BP MAP (mmHg) SpO2 O2 Device [place] Coma Scale Score Pain    01/20/25 1017 -- -- -- -- -- -- -- -- 9 01/20/25 08:52:25 98.3 °F (36.8 °C) 72 18 125/73 90 99 % -- -- --    01/20/25 0400 -- -- -- -- -- -- -- 15 --    01/20/25 0006 -- -- -- -- -- -- -- 15 --    01/19/25 22:09:43 98.8 °F (37.1 °C) 84 18 133/80 98 99 % -- -- --    01/19/25 2009 -- -- -- -- -- -- -- -- 9 01/19/25 2000 -- -- -- -- -- -- -- 15 --    01/19/25 1700 -- 67 -- -- -- 97 % None (Room air) -- --    01/19/25 1630 -- -- -- -- -- -- -- 15 --    01/19/25 15:26:21 98.9 °F (37.2 °C) 76 16 126/67 87 95 % -- -- --    01/19/25 1500 -- -- -- -- -- 98 % -- -- --    01/19/25 1319 -- 77 -- -- -- 97 % None (Room air) -- 9 01/19/25 1230 -- -- -- -- -- 96 % -- 15 --    01/19/25 0830 -- 96 -- -- -- 97 % None (Room air) 15 4    01/19/25 0724 98 °F (36.7 °C) 84 16 113/74 87 94 % -- -- --    01/19/25 0255 -- -- -- -- -- -- -- 15 --    01/19/25 00:25:27 97.8 °F (36.6 °C) 66 17 117/74 88 97 % -- -- --    01/19/25 0000 -- -- -- -- -- -- -- 15 --    01/18/25 2148 -- -- -- -- -- -- -- -- 8    01/18/25 2000 98 °F (36.7 °C) 76 18 119/75 90 98 % None (Room air) 15 --    01/18/25 1700 -- 73 -- -- -- 97 % None (Room air) -- 5    01/18/25 1600 -- -- -- -- -- -- -- 15 --    01/18/25 1300 -- 77 -- -- -- 99 % -- -- --    01/18/25 1200 -- -- -- -- -- -- -- 15 --    01/18/25 10:10:24 98 °F (36.7 °C) 71 -- 119/76 90 100 % -- -- --    01/18/25 0900 -- -- -- -- -- 97 % None (Room air) -- --    01/18/25 0800 -- -- -- -- -- -- -- 15 --    01/18/25 07:06:47 98.8 °F (37.1 °C) 71 20 121/77 92 98 % -- -- --    01/18/25 0507 -- -- -- -- -- -- -- -- 8 01/18/25 0100 -- -- -- -- -- 95 %  None (Room air) 15 --    01/18/25 0022 -- -- -- -- -- -- -- -- 8    01/17/25 22:16:29 98.7 °F (37.1 °C) 68 -- 122/77 92 97 % -- -- --    01/17/25 21:40:51 -- 73 -- 126/79 95 97 % -- -- --    01/17/25 2138 -- -- -- 126/79 -- -- -- -- --    01/17/25 2030 -- -- -- -- -- -- None (Room air) 15 --    01/17/25 2007 -- -- -- -- -- -- -- -- 8    01/17/25 18:55:57 98.9 °F (37.2 °C) 76 16 125/78 94 95 % -- -- --    01/17/25 1720 -- -- -- -- -- -- -- -- 7    01/17/25 15:32:10 98 °F (36.7 °C) 72 -- 122/73 89 -- -- -- --    01/17/25 1505 -- -- -- -- -- -- -- -- 7    01/17/25 1207 -- -- -- -- -- -- -- 15 --    01/17/25 1137 -- -- -- -- -- -- -- -- 9    01/17/25 10:34:03 97.9 °F (36.6 °C) 72 18 -- -- 97 % -- -- --    01/17/25 0912 -- -- -- -- -- -- -- -- 9    01/17/25 0859 -- -- -- -- -- -- -- 15 --    01/17/25 07:23:55 98.1 °F (36.7 °C) 63 18 100/62 75 97 % -- -- --    01/17/25 0521 -- -- -- -- -- -- -- 15 --    01/17/25 05:09:30 -- 64 -- 108/71 83 97 % -- -- --    01/17/25 0507 -- -- -- -- -- -- -- -- 8    01/17/25 02:35:23 97.8 °F (36.6 °C) 59 -- 92/52 65 93 % -- -- --    01/17/25 0022 -- -- -- -- -- 97 % None (Room air) 15 --          Weight (last 2 days)       Date/Time Weight    01/20/25 0533 99.2 (218.7)    01/19/25 0552 99.2 (218.7)    01/18/25 0536 99.2 (218.7)            Pertinent Labs/Diagnostic Results:   Radiology:  XR abdomen 1 view kub   Final Interpretation by Rigo Ordonez DO (01/20 6266)      Multiple mildly dilated loops of air-filled small bowel predominantly distributed within the upper abdomen. The degree of small bowel distention overall appears mildly improved from radiograph of the previous day.               Workstation performed: [id number]         XR abdomen 1 view kub   Final Interpretation by Michael Chapman MD (01/17 5408)      Improved degree of small bowel dilation likely reflecting resolving obstruction status post ex lap with lysis of adhesions. Remaining small bowel distention suggesting  postoperative ileus.               Resident: Rashid Pelayo I, the attending radiologist, have reviewed the images and agree with the final report above.      Workstation performed: [id number]         CT abdomen pelvis w contrast   Final Interpretation by Michael Chapman MD (01/14 3919)      1.  Persistent partial small bowel obstruction with transition point in the left mid abdomen, with findings suspicious for left paraduodenal internal hernia. Time course has proven that the obstruction is incomplete as there is persistent gas and stool    in the colon and previously administered oral contrast has passed through the small bowel. No evidence for bowel wall compromise and chart review shows normal lactate levels.      2.  Mildly increased wall thickening at the first and second portions of the duodenum. Correlate for epigastric symptoms to suggest active peptic disease.      3.  Decreasing rim enhancement of posterior subcutaneous collection associated with wound dehiscence.      The study was marked in EPIC for immediate notification.         Workstation performed: [id number]         XR abdomen obstruction series   Final Interpretation by Christ Patton MD (01/14 0815)      Worsening small bowel distention, particularly in the left abdomen, with air-fluid levels suspicious for obstruction. As noted on the CT from 1/9/2025, this may be secondary to internal hernia.      The study was marked in EPIC for immediate notification.               Workstation performed: [id number]         XR abdomen 1 view kub   Final Interpretation by Rigo Ordonez DO (01/10 1019)      Prominent air-filled loops of small bowel within the left abdomen with small amount of contrast within the colon and rectum. Findings may reflect ileus versus partial obstruction. Bowel distention appears similar to radiograph of the previous day.               Resident: Link Cuadra I, the attending radiologist, have reviewed the  images and agree with the final report above.      Workstation performed: [id number]           Cardiology:  Echo complete w/ contrast if indicated   Final Result by Martha Andrade MD (01/10 1059)        Left Ventricle: Left ventricular cavity size is normal. Wall thickness    is normal. There is no concentric hypertrophy. The left ventricular    ejection fraction is 60-65%. Systolic function is normal. Wall motion is    normal. Diastolic function is normal.     Prior TTE study available for comparison. Prior study date: 7/19/2023.    No significant changes noted compared to the prior study.              Results from last 7 days   Lab Units 01/20/25  0549 01/19/25  0556 01/18/25  0512 01/17/25  0500 01/16/25  1018   WBC Thousand/uL 5.72 5.70 6.14 5.61 7.99   HEMOGLOBIN g/dL 7.9* 8.2* 8.0* 7.6* 7.9*   HEMATOCRIT % 27.1* 27.6* 26.8* 24.8* 26.0*   PLATELETS Thousands/uL 451* 465* 448* 416* 437*   TOTAL NEUT ABS Thousands/µL 3.93 3.83  --  3.42 5.70        Results from last 7 days   Lab Units 01/20/25  0549 01/19/25  0556 01/18/25  0512 01/17/25  0500 01/16/25  1018 01/15/25  0511   SODIUM mmol/L 141 141 142 141 139 140   POTASSIUM mmol/L 3.6 3.9 3.7 3.5 3.1* 4.0   CHLORIDE mmol/L 105 106 105 106 103 104   CO2 mmol/L 29 29 29 31 28 28   ANION GAP mmol/L 7 6 8 4 8 8   BUN mg/dL 5 3* 4* 5 6 9   CREATININE mg/dL 0.50* 0.50* 0.50* 0.54* 0.53* 0.52*   EGFR ml/min/1.73sq m 104 104 104 102 102 103   CALCIUM mg/dL 7.9* 7.9* 8.0* 7.6* 7.7* 7.7*   MAGNESIUM mg/dL 1.9  --   --   --  1.9 1.5*   PHOSPHORUS mg/dL 3.4  --   --   --  3.3 3.1     Results from last 7 days   Lab Units 01/18/25  0512 01/16/25  1018   AST U/L 11* 11*   ALT U/L 4* 4*   ALK PHOS U/L 111* 91   TOTAL PROTEIN g/dL 5.6* 5.6*   ALBUMIN g/dL 2.9* 2.9*   TOTAL BILIRUBIN mg/dL 0.50 0.55        Results from last 7 days   Lab Units 01/20/25  0549 01/19/25  0556 01/18/25  0512 01/17/25  0500 01/16/25  1018 01/15/25  0511 01/14/25  0525   GLUCOSE RANDOM mg/dL 104 85 91  87 109 125 100        Results from last 7 days   Lab Units 01/14/25  1309   LACTIC ACID mmol/L 1.2        Results from last 7 days   Lab Units 01/18/25  0512   CRP mg/L 79.7*             Network Utilization Review Department  ATTENTION: Please call with any questions or concerns to [phone] and carefully listen to the prompts so that you are directed to the right person. All voicemails are confidential.   For Discharge needs, contact Care Management DC Support Team at [phone] opt. 2  Send all requests for admission clinical reviews, approved or denied determinations and any other requests to dedicated fax number below belonging to the campus where the patient is receiving treatment. List of dedicated fax numbers for the Facilities:  FACILITY NAME UR FAX NUMBER   ADMISSION DENIALS (Administrative/Medical Necessity) [phone]   DISCHARGE SUPPORT TEAM (NETWORK) [phone]   PARENT CHILD HEALTH (Maternity/NICU/Pediatrics) [phone]   [facility] [phone]   [facility] [phone]   On license of [facility] [phone]   [facility] [phone]   [facility] [phone]   [facility] [phone]   [facility] [phone]   [facility] [phone]   [facility] [phone]   [facility] [phone]   [facility] [phone]   [facility] [phone]

## 2025-01-20 NOTE — PROGRESS NOTES
Progress Note - Orthopedics   Name: Samantha Rodriguez 59 y.o. female I MRN: [id number]  [facility] Date of Admission: 1/9/2025   Date of Service: 1/20/2025 I Hospital Day: 11    Assessment & Plan  Wound infection complicating hardware (HCC)  Samantha is a 60 y/o F presenting 4 weeks s/p  weeks s/p removal hardware L2-S1, posterior fusion L5-S1, revision instrumentation L2-pelvis, bilateral S2 alar screws with Dr. Bills and Dr. Stevens (DOS 12/19/24) with concern for wound dehiscence.      Now s/p I&D lumbar spine. Doing well. OR cultures growing proteus in both the deep and superficial cultures. ID changed abx to IV ceftriaxone pending suseptibilities. ID recommending 6wks IV abx. Pt taken for ex lap for obstruction, continues to feel bloated. Both drains have been removed. Prevena applied to incision. Orthopedically stable for discharge.    Plan:     WBAT all extremities  Continue Prevena   Regular diet  Continue abx per ID  ID consulted, appreciate recs   Picc line placement and 6wks iv ceftriaxone  Multimodal pain control  PT/OT  Lumbar radiculopathy  See above  Class 2 obesity without serious comorbidity with body mass index (BMI) of 39.0 to 39.9 in adult        Subjective   59 y.o.female doing well. No acute events, no new complaints. Pain well controlled. Denies fevers, chills, CP, SOB, N/V, numbness or tingling. Patient reports no issues with urination or bowel movements.     Objective :  Temp:  [98.8 °F (37.1 °C)-98.9 °F (37.2 °C)] 98.8 °F (37.1 °C)  HR:  [67-96] 84  BP: (126-133)/(67-80) 133/80  Resp:  [16-18] 18  SpO2:  [95 %-99 %] 99 %  O2 Device: None (Room air)    Musculoskeletal: bilateral lower  Skin intact . No erythema or ecchymosis.  Dressing c/d/i  TTP sona-incisional   Motor intact to +FHL/EHL, +ankle dorsi/plantar flexion  2+ DP pulse  Sensation intact L3-S1  Motor intact L3-S1  Prevena in place.      Lab Results: I have reviewed the following results:  Recent Labs      "01/18/25  0512 01/19/25  0556 01/20/25  0549   WBC 6.14 5.70 5.72   HGB 8.0* 8.2* 7.9*   HCT 26.8* 27.6* 27.1*   * 465* 451*   BUN 4* 3* 5   CREATININE 0.50* 0.50* 0.50*   CRP 79.7*  --   --      Blood Culture:    Lab Results   Component Value Date    BLOODCX No Growth After 5 Days. 01/03/2025    BLOODCX No Growth After 5 Days. 01/03/2025     Wound Culture: No results found for: \"WOUNDCULT\"  "

## 2025-01-21 LAB
ANION GAP SERPL CALCULATED.3IONS-SCNC: 3 MMOL/L (ref 4–13)
BASOPHILS # BLD AUTO: 0.02 THOUSANDS/ΜL (ref 0–0.1)
BASOPHILS NFR BLD AUTO: 1 % (ref 0–1)
BUN SERPL-MCNC: 5 MG/DL (ref 5–25)
CALCIUM SERPL-MCNC: 7.8 MG/DL (ref 8.4–10.2)
CHLORIDE SERPL-SCNC: 105 MMOL/L (ref 96–108)
CO2 SERPL-SCNC: 32 MMOL/L (ref 21–32)
CREAT SERPL-MCNC: 0.57 MG/DL (ref 0.6–1.3)
EOSINOPHIL # BLD AUTO: 0.35 THOUSAND/ΜL (ref 0–0.61)
EOSINOPHIL NFR BLD AUTO: 9 % (ref 0–6)
ERYTHROCYTE [DISTWIDTH] IN BLOOD BY AUTOMATED COUNT: 15.8 % (ref 11.6–15.1)
GFR SERPL CREATININE-BSD FRML MDRD: 100 ML/MIN/1.73SQ M
GLUCOSE SERPL-MCNC: 99 MG/DL (ref 65–140)
HCT VFR BLD AUTO: 26.6 % (ref 34.8–46.1)
HGB BLD-MCNC: 7.8 G/DL (ref 11.5–15.4)
IMM GRANULOCYTES # BLD AUTO: 0.02 THOUSAND/UL (ref 0–0.2)
IMM GRANULOCYTES NFR BLD AUTO: 1 % (ref 0–2)
LYMPHOCYTES # BLD AUTO: 1 THOUSANDS/ΜL (ref 0.6–4.47)
LYMPHOCYTES NFR BLD AUTO: 25 % (ref 14–44)
MCH RBC QN AUTO: 26.4 PG (ref 26.8–34.3)
MCHC RBC AUTO-ENTMCNC: 29.3 G/DL (ref 31.4–37.4)
MCV RBC AUTO: 90 FL (ref 82–98)
MONOCYTES # BLD AUTO: 0.32 THOUSAND/ΜL (ref 0.17–1.22)
MONOCYTES NFR BLD AUTO: 8 % (ref 4–12)
NEUTROPHILS # BLD AUTO: 2.28 THOUSANDS/ΜL (ref 1.85–7.62)
NEUTS SEG NFR BLD AUTO: 56 % (ref 43–75)
NRBC BLD AUTO-RTO: 0 /100 WBCS
PLATELET # BLD AUTO: 432 THOUSANDS/UL (ref 149–390)
PMV BLD AUTO: 9.2 FL (ref 8.9–12.7)
POTASSIUM SERPL-SCNC: 3.6 MMOL/L (ref 3.5–5.3)
RBC # BLD AUTO: 2.95 MILLION/UL (ref 3.81–5.12)
SODIUM SERPL-SCNC: 140 MMOL/L (ref 135–147)
WBC # BLD AUTO: 3.99 THOUSAND/UL (ref 4.31–10.16)

## 2025-01-21 PROCEDURE — NC001 PR NO CHARGE: Performed by: ORTHOPAEDIC SURGERY

## 2025-01-21 PROCEDURE — 99232 SBSQ HOSP IP/OBS MODERATE 35: CPT | Performed by: INTERNAL MEDICINE

## 2025-01-21 PROCEDURE — G0545 PR INHERENT VISIT TO INPT: HCPCS | Performed by: INTERNAL MEDICINE

## 2025-01-21 PROCEDURE — 85025 COMPLETE CBC W/AUTO DIFF WBC: CPT

## 2025-01-21 PROCEDURE — 99024 POSTOP FOLLOW-UP VISIT: CPT | Performed by: SURGERY

## 2025-01-21 PROCEDURE — 80048 BASIC METABOLIC PNL TOTAL CA: CPT

## 2025-01-21 RX ADMIN — BENZTROPINE MESYLATE 1 MG: 1 TABLET ORAL at 17:24

## 2025-01-21 RX ADMIN — PRAZOSIN HYDROCHLORIDE 2 MG: 2 CAPSULE ORAL at 21:21

## 2025-01-21 RX ADMIN — BUSPIRONE HYDROCHLORIDE 15 MG: 10 TABLET ORAL at 08:39

## 2025-01-21 RX ADMIN — PREGABALIN 150 MG: 75 CAPSULE ORAL at 08:39

## 2025-01-21 RX ADMIN — HYDROXYZINE HYDROCHLORIDE 25 MG: 25 TABLET, FILM COATED ORAL at 21:19

## 2025-01-21 RX ADMIN — PREGABALIN 150 MG: 75 CAPSULE ORAL at 21:19

## 2025-01-21 RX ADMIN — SERTRALINE HYDROCHLORIDE 200 MG: 100 TABLET ORAL at 21:19

## 2025-01-21 RX ADMIN — METHOCARBAMOL 500 MG: 500 TABLET ORAL at 12:36

## 2025-01-21 RX ADMIN — SENNOSIDES AND DOCUSATE SODIUM 1 TABLET: 50; 8.6 TABLET ORAL at 21:19

## 2025-01-21 RX ADMIN — METHOCARBAMOL 500 MG: 500 TABLET ORAL at 17:24

## 2025-01-21 RX ADMIN — OXYCODONE HYDROCHLORIDE 10 MG: 10 TABLET ORAL at 08:38

## 2025-01-21 RX ADMIN — ASPIRIN 81 MG: 81 TABLET, COATED ORAL at 08:39

## 2025-01-21 RX ADMIN — ENOXAPARIN SODIUM 40 MG: 40 INJECTION SUBCUTANEOUS at 08:39

## 2025-01-21 RX ADMIN — CEFTRIAXONE SODIUM 2000 MG: 10 INJECTION, POWDER, FOR SOLUTION INTRAVENOUS at 11:17

## 2025-01-21 RX ADMIN — METHOCARBAMOL 500 MG: 500 TABLET ORAL at 05:44

## 2025-01-21 RX ADMIN — BUSPIRONE HYDROCHLORIDE 15 MG: 10 TABLET ORAL at 21:19

## 2025-01-21 RX ADMIN — PANTOPRAZOLE SODIUM 40 MG: 40 TABLET, DELAYED RELEASE ORAL at 16:03

## 2025-01-21 RX ADMIN — ATORVASTATIN CALCIUM 40 MG: 40 TABLET, FILM COATED ORAL at 16:03

## 2025-01-21 RX ADMIN — TRAZODONE HYDROCHLORIDE 300 MG: 100 TABLET ORAL at 21:23

## 2025-01-21 RX ADMIN — OXYCODONE HYDROCHLORIDE 10 MG: 10 TABLET ORAL at 21:22

## 2025-01-21 RX ADMIN — PANTOPRAZOLE SODIUM 40 MG: 40 TABLET, DELAYED RELEASE ORAL at 05:45

## 2025-01-21 RX ADMIN — OXYCODONE HYDROCHLORIDE 10 MG: 10 TABLET ORAL at 04:03

## 2025-01-21 RX ADMIN — PREGABALIN 150 MG: 75 CAPSULE ORAL at 16:03

## 2025-01-21 RX ADMIN — BUSPIRONE HYDROCHLORIDE 15 MG: 10 TABLET ORAL at 16:03

## 2025-01-21 RX ADMIN — ARIPIPRAZOLE 10 MG: 10 TABLET ORAL at 08:39

## 2025-01-21 RX ADMIN — METHOCARBAMOL 500 MG: 500 TABLET ORAL at 23:54

## 2025-01-21 RX ADMIN — HYDROXYZINE HYDROCHLORIDE 25 MG: 25 TABLET, FILM COATED ORAL at 16:03

## 2025-01-21 RX ADMIN — ACETAMINOPHEN 975 MG: 325 TABLET, FILM COATED ORAL at 05:44

## 2025-01-21 RX ADMIN — HYDROXYZINE HYDROCHLORIDE 25 MG: 25 TABLET, FILM COATED ORAL at 08:39

## 2025-01-21 RX ADMIN — ACETAMINOPHEN 975 MG: 325 TABLET, FILM COATED ORAL at 14:17

## 2025-01-21 RX ADMIN — ACETAMINOPHEN 975 MG: 325 TABLET, FILM COATED ORAL at 21:19

## 2025-01-21 RX ADMIN — BENZTROPINE MESYLATE 1 MG: 1 TABLET ORAL at 08:39

## 2025-01-21 RX ADMIN — AMLODIPINE BESYLATE 2.5 MG: 2.5 TABLET ORAL at 08:39

## 2025-01-21 NOTE — PROGRESS NOTES
Progress Note - Orthopedics   Name: Samantha Rodriguez 59 y.o. female I MRN: [id number]  [facility] Date of Admission: 1/9/2025   Date of Service: 1/21/2025 I Hospital Day: 12    Assessment & Plan  Wound infection complicating hardware (HCC)  Samantha is a 60 y/o F presenting 4 weeks s/p removal hardware L2-S1, posterior fusion L5-S1, revision instrumentation L2-pelvis, bilateral S2 alar screws with Dr. Bills and Dr. Stevens (DOS 12/19/24) with concern for wound dehiscence.      Now s/p I&D lumbar spine. Doing well. OR cultures growing proteus in both the deep and superficial cultures. ID changed abx to IV ceftriaxone pending suseptibilities. ID recommending 6wks IV abx. Pt taken for ex lap for obstruction, continues to feel bloated. Both drains have been removed. Prevena applied to incision. Orthopedically stable for discharge.    Plan:     WBAT all extremities  maintain Prevena   Regular diet  Continue abx per ID  ID consulted, appreciate recs   Picc line placement and 6wks iv ceftriaxone  Multimodal pain control  PT/OT  Lumbar radiculopathy  See above  Class 2 obesity without serious comorbidity with body mass index (BMI) of 39.0 to 39.9 in adult        Subjective   59 y.o.female doing well. No acute events, no new complaints. Pain well controlled. Denies fevers, chills, CP, SOB, N/V, numbness or tingling. Patient reports no issues with urination or bowel movements.     Objective :  Temp:  [98.3 °F (36.8 °C)-99.1 °F (37.3 °C)] 98.4 °F (36.9 °C)  HR:  [66-72] 72  BP: (105-125)/(65-80) 122/80  Resp:  [16-19] 18  SpO2:  [96 %-99 %] 99 %  O2 Device: None (Room air)    Musculoskeletal: bilateral lower  Skin intact . No erythema or ecchymosis.  Dressing c/d/i  TTP sona-incisional   Motor intact to +FHL/EHL, +ankle dorsi/plantar flexion  2+ DP pulse  Sensation intact L3-S1  Motor intact L3-S1  Prevena in place.      Lab Results: I have reviewed the following results:  Recent Labs     01/19/25  0585  "01/20/25  0549 01/21/25  0543   WBC 5.70 5.72 3.99*   HGB 8.2* 7.9* 7.8*   HCT 27.6* 27.1* 26.6*   * 451* 432*   BUN 3* 5 5   CREATININE 0.50* 0.50* 0.57*     Blood Culture:    Lab Results   Component Value Date    BLOODCX No Growth After 5 Days. 01/03/2025    BLOODCX No Growth After 5 Days. 01/03/2025     Wound Culture: No results found for: \"WOUNDCULT\"  "

## 2025-01-21 NOTE — CASE MANAGEMENT
[facility] Support [place] has received APPROVED authorization.  Insurance:  [employer]   Auth obtained via portal.  Authorization received for: SNF  Facility: [facility]   Authorization #:[id number]   Start of Care:1/21  Next Review Date:1/23  Continued Stay Care Coordinator:   n/a  Submit next review to: fax  [phone]    Care Manager notified:will gaby     Please reach out to  for updates on any clinical information.

## 2025-01-21 NOTE — ASSESSMENT & PLAN NOTE
1/14 s/p ex lap, ALLISON of obstructive pelvic adhesions.  Operative findings suggestive of adhesive distal obstruction and possible closed-loop obstruction.  She is hemodynamically stable this morning.     Plan:  -Regular diet as tolerated  -Maintain PICC  -Continue antibiotics through 2/21 w/ PICC  -Appreciate APS recommendations  -cleared for d/c from orthopedic stand point; appreciate recs  -proveena management per ortho  -PT OT: Level 2  -DVT prophylaxis

## 2025-01-21 NOTE — PROGRESS NOTES
Progress Note - Surgery-General   Name: Samantha Rodriguez 59 y.o. female I MRN: [id number]  Unit/Bed#: [facility] 615-01 I Date of Admission: 1/9/2025   Date of Service: 1/21/2025 I Hospital Day: 12    Assessment & Plan  Small bowel obstruction due to adhesions (HCC)  1/14 s/p ex lap, ALLISON of obstructive pelvic adhesions.  Operative findings suggestive of adhesive distal obstruction and possible closed-loop obstruction.  She is hemodynamically stable this morning.     Plan:  -Regular diet as tolerated  -Maintain PICC  -Continue antibiotics through 2/21 w/ PICC  -Appreciate APS recommendations  -cleared for d/c from orthopedic stand point; appreciate recs  -proveena management per ortho  -PT OT: Level 2  -DVT prophylaxis      Please contact the SecureChat role for the Surgery-General service with any questions/concerns.    Subjective   No acute events overnight.  Patient does report some bloating located in the epigastric region associated with some abdominal pain.  She is having bowel movements and flatus.  She denies any nausea, vomiting, fever, chills, chest pain, shortness of breath.  She is tolerating a diet.  She is voiding.    Objective :  Temp:  [98.3 °F (36.8 °C)-99.1 °F (37.3 °C)] 98.4 °F (36.9 °C)  HR:  [66-72] 72  BP: (105-125)/(65-80) 122/80  Resp:  [16-19] 18  SpO2:  [96 %-99 %] 99 %  O2 Device: None (Room air)    I/O         01/19 0701  01/20 0700 01/20 0701  01/21 0700    P.O. 720     I.V. (mL/kg) 2082.8 (21)     IV Piggyback 50     Total Intake(mL/kg) 2852.8 (28.8)     Urine (mL/kg/hr) 1700 (0.7) 900 (0.4)    Drains 0 0    Stool 350     Total Output 2050 900    Net +802.8 -900          Unmeasured Urine Occurrence 1 x     Unmeasured Stool Occurrence 2 x           Lines/Drains/Airways       Active Status       Name Placement date Placement time Site Days    PICC Line 01/13/25 Left Basilic 01/13/25  1118  Basilic  7                  Physical Exam  Constitutional:       Appearance: Normal appearance.   HENT:       Head: Normocephalic and atraumatic.      Right Ear: External ear normal.      Left Ear: External ear normal.      Nose: Nose normal.   Eyes:      Conjunctiva/sclera: Conjunctivae normal.   Cardiovascular:      Rate and Rhythm: Normal rate.      Comments: Appears well-perfused  Pulmonary:      Effort: Pulmonary effort is normal. No respiratory distress.   Abdominal:      General: There is distension (Moderate located mostly in the epigastric region.).      Palpations: Abdomen is soft. There is no mass.      Tenderness: There is no abdominal tenderness. There is no guarding or rebound.   Skin:     General: Skin is warm and dry.   Neurological:      Mental Status: She is alert and oriented to person, place, and time.   Psychiatric:         Mood and Affect: Mood normal.           Lab Results: I have reviewed the following results:  Recent Labs     01/18/25  0512 01/19/25  0556 01/20/25  0549   WBC 6.14   < > 5.72   HGB 8.0*   < > 7.9*   HCT 26.8*   < > 27.1*   *   < > 451*   SODIUM 142   < > 141   K 3.7   < > 3.6      < > 105   CO2 29   < > 29   BUN 4*   < > 5   CREATININE 0.50*   < > 0.50*   GLUC 91   < > 104   MG  --   --  1.9   PHOS  --   --  3.4   AST 11*  --   --    ALT 4*  --   --    ALB 2.9*  --   --    TBILI 0.50  --   --    ALKPHOS 111*  --   --     < > = values in this interval not displayed.       Imaging Results Review: No pertinent imaging studies reviewed.  Other Study Results Review: No additional pertinent studies reviewed.    VTE Pharmacologic Prophylaxis: Enoxaparin (Lovenox)  VTE Mechanical Prophylaxis: sequential compression device

## 2025-01-21 NOTE — CASE MANAGEMENT
Case Management Discharge Planning Note    Patient name Samantha Rodriguez  Location [facility] 615/[facility] 615-01 MRN [id number]  : 1965 Date 2025       Current Admission Date: 2025  Current Admission Diagnosis:Wound infection complicating hardware ([facility])   Patient Active Problem List    Diagnosis Date Noted Date Diagnosed    Wound infection complicating hardware ([facility]) 2025     Small bowel obstruction due to adhesions ([facility]) 2025     History of UTI 2025     Volume overload 2025     Urinary tract infection 2025     Wound of sacral region 2025     RSV bronchitis 2025     Acute blood loss anemia 2024     Pain associated with surgical procedure 2024     Toxic encephalopathy 2024     Severe episode of recurrent major depressive disorder, without psychotic features ([facility]) 2024     Chronic low back pain, unspecified back pain laterality, unspecified whether sciatica present 2024     Opioid-induced constipation 2024     Weight gain 2024     Numbness 2023     Memory loss 2023     Hemiplegia, post-stroke ([facility]) 2022     Anemia 2022     Hypokalemia 2022     Slow transit constipation 03/15/2022     CVA (cerebral vascular accident) ([facility]) 2022     Mixed hyperlipidemia 2021     History of CVA (cerebrovascular accident) 2021     Dysphagia 2020     Ambulatory dysfunction 2020     Status post insertion of spinal cord stimulator 2020     SIRS (systemic inflammatory response syndrome) ([facility]) 2020     Tardive dyskinesia 2020     MIMI (obstructive sleep apnea)      Class 2 obesity without serious comorbidity with body mass index (BMI) of 39.0 to 39.9 in adult 2020     Post laminectomy syndrome 10/07/2019     Bipolar I disorder, most recent episode depressed ([facility]) 2019 09/15/2023    Spinal stenosis of lumbar region with neurogenic claudication 2018      Lumbar radiculopathy 12/08/2017     Chronic pain disorder 02/28/2017     Lumbar spondylosis 10/31/2016     Generalized anxiety disorder with panic attacks 10/26/2016     Bipolar disorder (HCC) 06/18/2015     PTSD (post-traumatic stress disorder) 06/18/2015     Panic disorder without agoraphobia 06/18/2015     Tremor 06/12/2014     Migraine 05/27/2014     GERD without esophagitis 05/01/2014     Cognitive disorder 04/18/2014     Overactive bladder 09/26/2013     Mood insomnia (HCC) 01/31/2013     Urinary incontinence 09/24/2012     Vitamin D deficiency 09/18/2012     Fibromyalgia 09/14/2012     Essential hypertension 09/14/2012       LOS (days): 12  Geometric Mean LOS (GMLOS) (days): 5.5  Days to GMLOS:-6.2     OBJECTIVE:  Risk of Unplanned Readmission Score: 39.73         Current admission status: Inpatient   Preferred Pharmacy:   [facility] [place], [place] - [address] [place] [address]  Phone: [phone] Fax: [phone]    [facility]/pharmacy #[id number] - [place] - [address]  [address] [facility] [address] [facility] [address]  Phone: [phone] Fax: [phone]    [facility] [place]  [place] - [address] [place]  [address] [place]  [place] [address]  Phone: [phone] Fax: [phone]    [facility] [place], [place] - [address]  [address] [facility] [address]  Phone: [phone] Fax: [phone]    [facility] - [place], [place] - [address]  [address]  [place] [address]  Phone: [phone] Fax: [phone]    Primary Care Provider: Glo Valente DO    Primary Insurance: [facility] re3DCARE MEDICARE [facility] RE[facility]  Secondary Insurance: [facility]    DISCHARGE DETAILS:                                                                                                               Facility Insurance Auth Number:  [id number]

## 2025-01-21 NOTE — CASE MANAGEMENT
[facility] received request for authorization from Care Manager.  Authorization request submitted for: SNF  Facility Name: [facility] NPI: [id number]  Facility MD:  Dr. Mathis NPI: [id number]   Authorization initiated by contacting insurance: [employer]   Via: H&CC Portal   Clinicals submitted via Portal attachment   Pending Reference #: [id number]     Care Manager notified: Will A     Updates to authorization status will be noted in chart. Please reach out to CM for updates on any clinical information.

## 2025-01-21 NOTE — PLAN OF CARE
d/lidia ensure clear as PT dislikes and not drinking, completing meals well, requesting food on regular textured diet versus dental soft, consider ST evaluation for appropriate textures and potential diet upgrade.   Problem: Nutrition/Hydration-ADULT  Goal: Nutrient/Hydration intake appropriate for improving, restoring or maintaining nutritional needs  Description: Monitor and assess patient's nutrition/hydration status for malnutrition. Collaborate with interdisciplinary team and initiate plan and interventions as ordered.  Monitor patient's weight and dietary intake as ordered or per policy. Utilize nutrition screening tool and intervene as necessary. Determine patient's food preferences and provide high-protein, high-caloric foods as appropriate.     INTERVENTIONS:  - Monitor oral intake, urinary output, labs, and treatment plans  - Assess nutrition and hydration status and recommend course of action  - Evaluate amount of meals eaten  - Assist patient with eating if necessary   - Allow adequate time for meals  - Recommend/ encourage appropriate diets, oral nutritional supplements, and vitamin/mineral supplements  - Order, calculate, and assess calorie counts as needed  - Recommend, monitor, and adjust tube feedings and TPN/PPN based on assessed needs  - Assess need for intravenous fluids  - Provide specific nutrition/hydration education as appropriate  - Include patient/family/caregiver in decisions related to nutrition  Outcome: Progressing

## 2025-01-21 NOTE — ASSESSMENT & PLAN NOTE
Samantha is a 60 y/o F presenting 4 weeks s/p removal hardware L2-S1, posterior fusion L5-S1, revision instrumentation L2-pelvis, bilateral S2 alar screws with Dr. Bills and Dr. Stevens (DOS 12/19/24) with concern for wound dehiscence.      Now s/p I&D lumbar spine. Doing well. OR cultures growing proteus in both the deep and superficial cultures. ID changed abx to IV ceftriaxone pending suseptibilities. ID recommending 6wks IV abx. Pt taken for ex lap for obstruction, continues to feel bloated. Both drains have been removed. Prevena applied to incision. Orthopedically stable for discharge.    Plan:     WBAT all extremities  maintain Prevena   Regular diet  Continue abx per ID  ID consulted, appreciate recs   Picc line placement and 6wks iv ceftriaxone  Multimodal pain control  PT/OT

## 2025-01-21 NOTE — ASSESSMENT & PLAN NOTE
Recurrent.  Initially managed with NGT.  Now status post XLap, ALLISON 1/14.  Overall, patient is clinically improved, although she has increased abdominal distention and discomfort overnight.  She has flatus and BM.    Postoperative care per Surgery

## 2025-01-21 NOTE — PROGRESS NOTES
Progress Note - Infectious Disease   Name: Samantha Rodriguez 59 y.o. female I MRN: [id number]  Unit/Bed#: [facility] 615-01 I Date of Admission: 1/9/2025   Date of Service: 1/21/2025 I Hospital Day: 12    Assessment & Plan  Wound infection complicating hardware (HCC)  Presenting with delayed wound dehiscence in setting of recent fusion surgery as below.  Status post I&D 1/10/25.  Intraoperative findings notable for wound dehiscence to level of fascia which appeared intact.  Fascia was opened and deep space was explored.  Superficial and deep cultures grew Proteus.  Concern for HW involvement.    Continue ceftriaxone 2g IV Q24 through 2/21/25 to complete 6 weeks postop given concern for HW infection  Monitor closely while on antibiotics for toxicities including diarrhea, rash, cytopenias, or kidney injury  Check weekly CBC-diff, CMP while on IV antibiotics to monitor for toxicities.  D/C planning for STR  Outpatient follow-up with ID 2 weeks after D/C  D/C PICC after last dose of IV antibiotics  Given retained HW, will likely need suppressive antibiotics after IV course complete  Lumbar radiculopathy  Remote fusion L2-S1.  Underwent removal hardware L2-S1, posterior fusion L5-S1, revision instrumentation L2 to pelvis; application of bilateral S2 alar screws and bone grafting of the disc at L5-S1 12/19/24.  Complicated by infection as above.  RSV bronchitis  Patient tested positive on 1/3/25.  Mild URI symptoms, no O2 requirement.  Improved, off isolation.  Small bowel obstruction due to adhesions (HCC)  Recurrent.  Initially managed with NGT.  Now status post XLap, ALLISON 1/14.  Overall, patient is clinically improved, although she has increased abdominal distention and discomfort overnight.  She has flatus and BM.    Postoperative care per Surgery    Discussed with patient in detail regarding the above plan.      Antibiotics:  Ceftriaxone  POD #10    Subjective   Patient complains of more abdominal distention and discomfort  overnight.   She is tolerating liquids well but had more abdominal distention/discomfort with regular diet.  She continues to have flatus and watery BM.  Low back pain mild and controlled.  Temperature stays down.  No chills.    Objective :  Temp:  [97.8 °F (36.6 °C)-98.5 °F (36.9 °C)] 97.8 °F (36.6 °C)  HR:  [66-84] 67  BP: (104-146)/(64-98) 104/64  Resp:  [16-18] 16  SpO2:  [92 %-99 %] 99 %  O2 Device: None (Room air)    General:  No acute distress  Psychiatric:  Awake and alert  Pulmonary:  Normal respiratory excursion without accessory muscle use  Abdomen:  Soft, moderate distention, mild diffuse tenderness, bowel sounds present  Extremities: Stable leg edema  Skin:  No rashes      Lab Results: I have reviewed the following results:  Results from last 7 days   Lab Units 01/21/25  0543 01/20/25  0549 01/19/25  0556   WBC Thousand/uL 3.99* 5.72 5.70   HEMOGLOBIN g/dL 7.8* 7.9* 8.2*   PLATELETS Thousands/uL 432* 451* 465*     Results from last 7 days   Lab Units 01/21/25  0543 01/20/25  0549 01/19/25  0556 01/18/25  0512 01/17/25  0500 01/16/25  1018   SODIUM mmol/L 140 141 141 142   < > 139   POTASSIUM mmol/L 3.6 3.6 3.9 3.7   < > 3.1*   CHLORIDE mmol/L 105 105 106 105   < > 103   CO2 mmol/L 32 29 29 29   < > 28   BUN mg/dL 5 5 3* 4*   < > 6   CREATININE mg/dL 0.57* 0.50* 0.50* 0.50*   < > 0.53*   EGFR ml/min/1.73sq m 100 104 104 104   < > 102   CALCIUM mg/dL 7.8* 7.9* 7.9* 8.0*   < > 7.7*   AST U/L  --   --   --  11*  --  11*   ALT U/L  --   --   --  4*  --  4*   ALK PHOS U/L  --   --   --  111*  --  91   ALBUMIN g/dL  --   --   --  2.9*  --  2.9*    < > = values in this interval not displayed.             Results from last 7 days   Lab Units 01/18/25  0512   CRP mg/L 79.7*

## 2025-01-21 NOTE — PLAN OF CARE
Problem: PAIN - ADULT  Goal: Verbalizes/displays adequate comfort level or baseline comfort level  Description: Interventions:  - Encourage patient to monitor pain and request assistance  - Assess pain using appropriate pain scale  - Administer analgesics based on type and severity of pain and evaluate response  - Implement non-pharmacological measures as appropriate and evaluate response  - Consider cultural and social influences on pain and pain management  - Notify physician/advanced practitioner if interventions unsuccessful or patient reports new pain  Outcome: Progressing     Problem: INFECTION - ADULT  Goal: Absence or prevention of progression during hospitalization  Description: INTERVENTIONS:  - Assess and monitor for signs and symptoms of infection  - Monitor lab/diagnostic results  - Monitor all insertion sites, i.e. indwelling lines, tubes, and drains  - Monitor endotracheal if appropriate and nasal secretions for changes in amount and color  - Jackson appropriate cooling/warming therapies per order  - Administer medications as ordered  - Instruct and encourage patient and family to use good hand hygiene technique  - Identify and instruct in appropriate isolation precautions for identified infection/condition  Outcome: Progressing     Problem: SAFETY ADULT  Goal: Maintain or return to baseline ADL function  Description: INTERVENTIONS:  -  Assess patient's ability to carry out ADLs; assess patient's baseline for ADL function and identify physical deficits which impact ability to perform ADLs (bathing, care of mouth/teeth, toileting, grooming, dressing, etc.)  - Assess/evaluate cause of self-care deficits   - Assess range of motion  - Assess patient's mobility; develop plan if impaired  - Assess patient's need for assistive devices and provide as appropriate  - Encourage maximum independence but intervene and supervise when necessary  - Involve family in performance of ADLs  - Assess for home care  needs following discharge   - Consider OT consult to assist with ADL evaluation and planning for discharge  - Provide patient education as appropriate  Outcome: Progressing     Problem: DISCHARGE PLANNING  Goal: Discharge to home or other facility with appropriate resources  Description: INTERVENTIONS:  - Identify barriers to discharge w/patient and caregiver  - Arrange for needed discharge resources and transportation as appropriate  - Identify discharge learning needs (meds, wound care, etc.)  - Arrange for interpretive services to assist at discharge as needed  - Refer to Case Management Department for coordinating discharge planning if the patient needs post-hospital services based on physician/advanced practitioner order or complex needs related to functional status, cognitive ability, or social support system  Outcome: Progressing     Problem: Knowledge Deficit  Goal: Patient/family/caregiver demonstrates understanding of disease process, treatment plan, medications, and discharge instructions  Description: Complete learning assessment and assess knowledge base.  Interventions:  - Provide teaching at level of understanding  - Provide teaching via preferred learning methods  Outcome: Progressing     Problem: GASTROINTESTINAL - ADULT  Goal: Minimal or absence of nausea and/or vomiting  Description: INTERVENTIONS:  - Administer IV fluids if ordered to ensure adequate hydration  - Maintain NPO status until nausea and vomiting are resolved  - Nasogastric tube if ordered  - Administer ordered antiemetic medications as needed  - Provide nonpharmacologic comfort measures as appropriate  - Advance diet as tolerated, if ordered  - Consider nutrition services referral to assist patient with adequate nutrition and appropriate food choices  Outcome: Progressing  Goal: Maintains or returns to baseline bowel function  Description: INTERVENTIONS:  - Assess bowel function  - Encourage oral fluids to ensure adequate  hydration  - Administer IV fluids if ordered to ensure adequate hydration  - Administer ordered medications as needed  - Encourage mobilization and activity  - Consider nutritional services referral to assist patient with adequate nutrition and appropriate food choices  Outcome: Progressing     Problem: GENITOURINARY - ADULT  Goal: Maintains or returns to baseline urinary function  Description: INTERVENTIONS:  - Assess urinary function  - Encourage oral fluids to ensure adequate hydration if ordered  - Administer IV fluids as ordered to ensure adequate hydration  - Administer ordered medications as needed  - Offer frequent toileting  - Follow urinary retention protocol if ordered  Outcome: Progressing     Problem: SKIN/TISSUE INTEGRITY - ADULT  Goal: Incision(s), wounds(s) or drain site(s) healing without S/S of infection  Description: INTERVENTIONS  - Assess and document dressing, incision, wound bed, drain sites and surrounding tissue  - Provide patient and family education  - Perform skin care/dressing changes every shift   Outcome: Progressing     Problem: Prexisting or High Potential for Compromised Skin Integrity  Goal: Skin integrity is maintained or improved  Description: INTERVENTIONS:  - Identify patients at risk for skin breakdown  - Assess and monitor skin integrity  - Assess and monitor nutrition and hydration status  - Monitor labs   - Assess for incontinence   - Turn and reposition patient  - Assist with mobility/ambulation  - Relieve pressure over bony prominences  - Avoid friction and shearing  - Provide appropriate hygiene as needed including keeping skin clean and dry  - Evaluate need for skin moisturizer/barrier cream  - Collaborate with interdisciplinary team   - Patient/family teaching  - Consider wound care consult   Outcome: Progressing     Problem: Nutrition/Hydration-ADULT  Goal: Nutrient/Hydration intake appropriate for improving, restoring or maintaining nutritional needs  Description:  Monitor and assess patient's nutrition/hydration status for malnutrition. Collaborate with interdisciplinary team and initiate plan and interventions as ordered.  Monitor patient's weight and dietary intake as ordered or per policy. Utilize nutrition screening tool and intervene as necessary. Determine patient's food preferences and provide high-protein, high-caloric foods as appropriate.     INTERVENTIONS:  - Monitor oral intake, urinary output, labs, and treatment plans  - Assess nutrition and hydration status and recommend course of action  - Evaluate amount of meals eaten  - Assist patient with eating if necessary   - Allow adequate time for meals  - Recommend/ encourage appropriate diets, oral nutritional supplements, and vitamin/mineral supplements  - Order, calculate, and assess calorie counts as needed  - Recommend, monitor, and adjust tube feedings and TPN/PPN based on assessed needs  - Assess need for intravenous fluids  - Provide specific nutrition/hydration education as appropriate  - Include patient/family/caregiver in decisions related to nutrition  Outcome: Progressing

## 2025-01-22 VITALS
RESPIRATION RATE: 17 BRPM | DIASTOLIC BLOOD PRESSURE: 74 MMHG | HEIGHT: 60 IN | TEMPERATURE: 98.2 F | WEIGHT: 220.68 LBS | SYSTOLIC BLOOD PRESSURE: 114 MMHG | OXYGEN SATURATION: 96 % | BODY MASS INDEX: 43.33 KG/M2 | HEART RATE: 66 BPM

## 2025-01-22 LAB
ANION GAP SERPL CALCULATED.3IONS-SCNC: 9 MMOL/L (ref 4–13)
BASOPHILS # BLD AUTO: 0.03 THOUSANDS/ΜL (ref 0–0.1)
BASOPHILS NFR BLD AUTO: 1 % (ref 0–1)
BUN SERPL-MCNC: 6 MG/DL (ref 5–25)
CALCIUM SERPL-MCNC: 8 MG/DL (ref 8.4–10.2)
CHLORIDE SERPL-SCNC: 106 MMOL/L (ref 96–108)
CO2 SERPL-SCNC: 28 MMOL/L (ref 21–32)
CREAT SERPL-MCNC: 0.62 MG/DL (ref 0.6–1.3)
EOSINOPHIL # BLD AUTO: 0.36 THOUSAND/ΜL (ref 0–0.61)
EOSINOPHIL NFR BLD AUTO: 6 % (ref 0–6)
ERYTHROCYTE [DISTWIDTH] IN BLOOD BY AUTOMATED COUNT: 15.8 % (ref 11.6–15.1)
GFR SERPL CREATININE-BSD FRML MDRD: 97 ML/MIN/1.73SQ M
GLUCOSE SERPL-MCNC: 85 MG/DL (ref 65–140)
HCT VFR BLD AUTO: 26.9 % (ref 34.8–46.1)
HGB BLD-MCNC: 7.9 G/DL (ref 11.5–15.4)
IMM GRANULOCYTES # BLD AUTO: 0.03 THOUSAND/UL (ref 0–0.2)
IMM GRANULOCYTES NFR BLD AUTO: 1 % (ref 0–2)
LYMPHOCYTES # BLD AUTO: 0.83 THOUSANDS/ΜL (ref 0.6–4.47)
LYMPHOCYTES NFR BLD AUTO: 15 % (ref 14–44)
MCH RBC QN AUTO: 26.8 PG (ref 26.8–34.3)
MCHC RBC AUTO-ENTMCNC: 29.4 G/DL (ref 31.4–37.4)
MCV RBC AUTO: 91 FL (ref 82–98)
MONOCYTES # BLD AUTO: 0.39 THOUSAND/ΜL (ref 0.17–1.22)
MONOCYTES NFR BLD AUTO: 7 % (ref 4–12)
NEUTROPHILS # BLD AUTO: 4.1 THOUSANDS/ΜL (ref 1.85–7.62)
NEUTS SEG NFR BLD AUTO: 70 % (ref 43–75)
NRBC BLD AUTO-RTO: 0 /100 WBCS
PLATELET # BLD AUTO: 450 THOUSANDS/UL (ref 149–390)
PMV BLD AUTO: 9.6 FL (ref 8.9–12.7)
POTASSIUM SERPL-SCNC: 3.7 MMOL/L (ref 3.5–5.3)
RBC # BLD AUTO: 2.95 MILLION/UL (ref 3.81–5.12)
SODIUM SERPL-SCNC: 143 MMOL/L (ref 135–147)
WBC # BLD AUTO: 5.74 THOUSAND/UL (ref 4.31–10.16)

## 2025-01-22 PROCEDURE — 80048 BASIC METABOLIC PNL TOTAL CA: CPT | Performed by: NURSE PRACTITIONER

## 2025-01-22 PROCEDURE — NC001 PR NO CHARGE: Performed by: SURGERY

## 2025-01-22 PROCEDURE — 85025 COMPLETE CBC W/AUTO DIFF WBC: CPT | Performed by: NURSE PRACTITIONER

## 2025-01-22 PROCEDURE — 99024 POSTOP FOLLOW-UP VISIT: CPT | Performed by: NURSE PRACTITIONER

## 2025-01-22 PROCEDURE — NC001 PR NO CHARGE: Performed by: ORTHOPAEDIC SURGERY

## 2025-01-22 PROCEDURE — G0545 PR INHERENT VISIT TO INPT: HCPCS | Performed by: INTERNAL MEDICINE

## 2025-01-22 PROCEDURE — 99232 SBSQ HOSP IP/OBS MODERATE 35: CPT | Performed by: INTERNAL MEDICINE

## 2025-01-22 RX ORDER — OXYCODONE HYDROCHLORIDE 5 MG/1
5 TABLET ORAL EVERY 4 HOURS PRN
Qty: 30 TABLET | Refills: 0 | Status: SHIPPED | OUTPATIENT
Start: 2025-01-22 | End: 2025-02-01

## 2025-01-22 RX ORDER — LIDOCAINE 50 MG/G
1 PATCH TOPICAL DAILY
Qty: 10 PATCH | Refills: 0 | Status: SHIPPED | OUTPATIENT
Start: 2025-01-22

## 2025-01-22 RX ORDER — BENZTROPINE MESYLATE 1 MG/1
1 TABLET ORAL 2 TIMES DAILY
Qty: 60 TABLET | Refills: 0 | Status: SHIPPED | OUTPATIENT
Start: 2025-01-22

## 2025-01-22 RX ORDER — METHOCARBAMOL 500 MG/1
500 TABLET, FILM COATED ORAL EVERY 6 HOURS SCHEDULED
Qty: 30 TABLET | Refills: 0 | Status: SHIPPED | OUTPATIENT
Start: 2025-01-22

## 2025-01-22 RX ADMIN — OXYCODONE HYDROCHLORIDE 10 MG: 10 TABLET ORAL at 08:44

## 2025-01-22 RX ADMIN — METHOCARBAMOL 500 MG: 500 TABLET ORAL at 12:12

## 2025-01-22 RX ADMIN — BISACODYL 10 MG: 10 SUPPOSITORY RECTAL at 08:43

## 2025-01-22 RX ADMIN — PANTOPRAZOLE SODIUM 40 MG: 40 TABLET, DELAYED RELEASE ORAL at 05:34

## 2025-01-22 RX ADMIN — ENOXAPARIN SODIUM 40 MG: 40 INJECTION SUBCUTANEOUS at 08:43

## 2025-01-22 RX ADMIN — ACETAMINOPHEN 975 MG: 325 TABLET, FILM COATED ORAL at 05:34

## 2025-01-22 RX ADMIN — METHOCARBAMOL 500 MG: 500 TABLET ORAL at 05:34

## 2025-01-22 RX ADMIN — BENZTROPINE MESYLATE 1 MG: 1 TABLET ORAL at 08:43

## 2025-01-22 RX ADMIN — HYDROXYZINE HYDROCHLORIDE 25 MG: 25 TABLET, FILM COATED ORAL at 08:43

## 2025-01-22 RX ADMIN — PREGABALIN 150 MG: 75 CAPSULE ORAL at 08:43

## 2025-01-22 RX ADMIN — ARIPIPRAZOLE 10 MG: 10 TABLET ORAL at 08:44

## 2025-01-22 RX ADMIN — ACETAMINOPHEN 975 MG: 325 TABLET, FILM COATED ORAL at 13:23

## 2025-01-22 RX ADMIN — CEFTRIAXONE SODIUM 2000 MG: 10 INJECTION, POWDER, FOR SOLUTION INTRAVENOUS at 12:11

## 2025-01-22 RX ADMIN — ASPIRIN 81 MG: 81 TABLET, COATED ORAL at 08:43

## 2025-01-22 RX ADMIN — ONDANSETRON 4 MG: 2 INJECTION INTRAMUSCULAR; INTRAVENOUS at 05:37

## 2025-01-22 RX ADMIN — AMLODIPINE BESYLATE 2.5 MG: 2.5 TABLET ORAL at 08:43

## 2025-01-22 RX ADMIN — BUSPIRONE HYDROCHLORIDE 15 MG: 10 TABLET ORAL at 08:43

## 2025-01-22 NOTE — PROGRESS NOTES
Progress Note - Orthopedics   Name: Samantha Rodriguez 59 y.o. female I MRN: [id number]  [facility] I Date of Admission: 1/9/2025   Date of Service: 1/22/2025 I Hospital Day: 13    Assessment & Plan  Wound infection complicating hardware (HCC)  Samantha is a 58 y/o F presenting 4 weeks s/p removal hardware L2-S1, posterior fusion L5-S1, revision instrumentation L2-pelvis, bilateral S2 alar screws with Dr. Bills and Dr. Stevens (DOS 12/19/24) with concern for wound dehiscence.      Now s/p I&D lumbar spine. Doing well. OR cultures growing proteus in both the deep and superficial cultures. ID changed abx to IV ceftriaxone pending suseptibilities. ID recommending 6wks IV abx. Pt taken for ex lap for obstruction, continues to feel bloated. Both drains have been removed. Prevena applied to incision. Orthopedically stable for discharge. Proveena may need to be changed prior to discharge. Please reach out to the orthopedics team prior to discharge.    Plan:     WBAT all extremities  maintain Prevena   Regular diet  Continue abx per ID  ID consulted, appreciate recs   Picc line placement and 6wks iv ceftriaxone  Multimodal pain control  PT/OT  Lumbar radiculopathy  See above  Class 2 obesity without serious comorbidity with body mass index (BMI) of 39.0 to 39.9 in adult      Subjective   59 y.o.female doing well. No acute events, no new complaints. Pain well controlled. Denies fevers, chills, CP, SOB, N/V, numbness or tingling. Patient reports no issues with urination or bowel movements.    Objective :  Temp:  [97.4 °F (36.3 °C)-98.5 °F (36.9 °C)] 98.2 °F (36.8 °C)  HR:  [66-84] 66  BP: (104-146)/(63-98) 114/74  Resp:  [16-18] 17  SpO2:  [92 %-99 %] 96 %  O2 Device: None (Room air)  Musculoskeletal: bilateral lower  Skin intact . No erythema or ecchymosis.  Dressing c/d/i  TTP sona-incisional   Motor intact to +FHL/EHL, +ankle dorsi/plantar flexion  2+ DP pulse  Sensation intact L3-S1  Motor intact L3-S1  Prevena  "in place.        Lab Results: I have reviewed the following results:  Recent Labs     01/20/25  0549 01/21/25  0543 01/22/25  0452   WBC 5.72 3.99* 5.74   HGB 7.9* 7.8* 7.9*   HCT 27.1* 26.6* 26.9*   * 432* 450*   BUN 5 5 6   CREATININE 0.50* 0.57* 0.62     Blood Culture:    Lab Results   Component Value Date    BLOODCX No Growth After 5 Days. 01/03/2025    BLOODCX No Growth After 5 Days. 01/03/2025     Wound Culture: No results found for: \"WOUNDCULT\"  "

## 2025-01-22 NOTE — ASSESSMENT & PLAN NOTE
1/14 s/p ex lap, ALLISON of obstructive pelvic adhesions.  Operative findings suggestive of adhesive distal obstruction and possible closed-loop obstruction.  She is hemodynamically stable this morning.     Plan:  -Regular diet as tolerated  -Maintain PICC  -Continue antibiotics through 2/21 w/ PICC  -Appreciate APS recommendations  -cleared for d/c from orthopedic stand point; appreciate recs  -proveena management per ortho  -PT OT: Level 2  -DVT prophylaxis  -dispo planning

## 2025-01-22 NOTE — DISCHARGE SUMMARY
"Discharge Summary - Surgery-General   Name: Samantha Rodriguez 59 y.o. female I MRN: [id number]  Unit/Bed#: [facility] 615-01 I Date of Admission: 1/9/2025   Date of Service: 1/22/2025 I Hospital Day: 13    Admission Date: 1/9/2025 2358  Discharge Date: 01/22/25  Admitting Diagnosis: Wound dehiscence [T81.30XA]  Discharge Diagnosis:   Medical Problems       Resolved Problems  Date Reviewed: 1/10/2025   None         HPI: per Humberto Smith PA-c:  \" Samantha Rodriguez is a 59 y.o. female who presents with ambulatory dysfunction secondary to back pain.  Patient reports that she was recently admitted for back surgery and discharged to rehab.  She reports that following getting home from rehab she has continued to have back pain and has struggled to ambulate with her walker.  Patient denies any lower extremity numbness or weakness, saddle paresthesias, urinary or bowel incontinence/retention.  Patient also noted to have fever when she got to the floor.  She denies any infectious symptoms such as fever, chills, cough, congestion, abdominal pain, nausea, vomiting, diarrhea, dysuria, or hematuria.\"    Procedures Performed: No orders of the defined types were placed in this encounter.      Summary of Hospital Course: 58 y/o female presented with ambulatory dysfunction secondary to Back Pain.  Reported she was recently admitted for back surgery and then discharged to Rehab.  Once home from rehab the back pain continued and she had trouble ambulating/  Denied any extremity numbness or weakness, no incontinence or retention .  Once admitted to the floor was found to have a temperature.Seen by ORtho and has a Prevena vac in place.  Will follow up with ORtho as well as [facility] surgical associates and her PCP after discharge.  Please review discharge instructions, if any conserns or questions please reach out to our office.  For more exact details  of her stay, pleasse refer to medical records.    Significant Findings, Care, Treatment and " Services Provided: CT abdomen pelvis w contrast  Result Date: 1/14/2025  Impression: 1.  Persistent partial small bowel obstruction with transition point in the left mid abdomen, with findings suspicious for left paraduodenal internal hernia. Time course has proven that the obstruction is incomplete as there is persistent gas and stool in the colon and previously administered oral contrast has passed through the small bowel. No evidence for bowel wall compromise and chart review shows normal lactate levels. 2.  Mildly increased wall thickening at the first and second portions of the duodenum. Correlate for epigastric symptoms to suggest active peptic disease. 3.  Decreasing rim enhancement of posterior subcutaneous collection associated with wound dehiscence. The study was marked in EPIC for immediate notification. Workstation performed: [id number]     XR abdomen obstruction series  Result Date: 1/14/2025  Impression: Worsening small bowel distention, particularly in the left abdomen, with air-fluid levels suspicious for obstruction. As noted on the CT from 1/9/2025, this may be secondary to internal hernia. The study was marked in EPIC for immediate notification. Workstation performed: [id number]     XR abdomen 1 view kub  Result Date: 1/10/2025  Impression: Prominent air-filled loops of small bowel within the left abdomen with small amount of contrast within the colon and rectum. Findings may reflect ileus versus partial obstruction. Bowel distention appears similar to radiograph of the previous day. Resident: Link Cuadra I, the attending radiologist, have reviewed the images and agree with the final report above. Workstation performed: [id number]     XR abdomen 1 vw portable  Result Date: 1/9/2025  Impression: NG tube projects over the gastric antrum. Improved bowel distention. Workstation performed: [id number]     CT abdomen pelvis w contrast  Result Date: 1/9/2025  Impression: Dilated loops of small  bowel as described above suggestive of underlying small bowel obstruction. The transition point appears to be in the central abdomen where there is slight swirling of the mesenteric vessels. An underlying internal hernia cannot be fully excluded. Fluid seen in the colon suggestive of diarrheal illness without colonic wall thickening to suggest colitis. Trace bilateral pleural effusions. Cholelithiasis. Small ascites. Previously noted neurostimulator leads have been removed in the interim although there appears to be a small retained fragment seen in the posterior lower back. Ill-defined fluid with pockets of soft tissue gas and surrounding inflammatory stranding in the midline lower back suspicious for abscess. Workstation performed: [id number]     XR abdomen 1 view kub  Result Date: 1/9/2025  Impression: Several dilated loops of presumably small bowel measuring up to 4.5 cm in diameter. This may indicate presence of small bowel obstruction or ileus. Recommend further evaluation with CT abdomen and pelvis, preferably with IV and oral contrast if feasible,  or serial abdominal radiographs after oral contrast administration. The study was marked in EPIC for immediate notification. Workstation performed: [id number]         Complications: back pain bringing her back to hospital    Condition at Discharge: stable       Discharge instructions/Information to patient and family:   See After Visit Summary (AVS) for information provided to patient and family.      Provisions for Follow-Up Care:  See after visit summary for information related to follow-up care and any pertinent home health orders.      PCP: Glo Valente DO    Disposition:  rehab    Planned Readmission: no    Discharge Medications:  See after visit summary for reconciled discharge medications provided to patient and family.      Discharge Statement:  I have spent a total time of 30 minutes in caring for this patient on the day of the visit/encounter. .

## 2025-01-22 NOTE — ASSESSMENT & PLAN NOTE
Presenting with delayed wound dehiscence in setting of recent fusion surgery as below.  Status post I&D 1/10/25.  Intraoperative findings notable for wound dehiscence to level of fascia which appeared intact.  Fascia was opened and deep space was explored.  Superficial and deep cultures grew Proteus.  Concern for HW involvement.    Continue ceftriaxone 2g IV Q24 through 2/21/25 to complete 6 weeks postop given concern for HW infection  Monitor closely while on antibiotics for toxicities including diarrhea, rash, cytopenias, or kidney injury  Check weekly CBC-diff, CMP while on IV antibiotics to monitor for toxicities.  D/C planning for STR  Outpatient follow-up with ID 2 weeks after D/C - office notified  D/C PICC after last dose of IV antibiotics  Given retained HW, will likely need suppressive antibiotics after IV course complete

## 2025-01-22 NOTE — PLAN OF CARE
Problem: PAIN - ADULT  Goal: Verbalizes/displays adequate comfort level or baseline comfort level  Description: Interventions:  - Encourage patient to monitor pain and request assistance  - Assess pain using appropriate pain scale  - Administer analgesics based on type and severity of pain and evaluate response  - Implement non-pharmacological measures as appropriate and evaluate response  - Consider cultural and social influences on pain and pain management  - Notify physician/advanced practitioner if interventions unsuccessful or patient reports new pain  Outcome: Progressing     Problem: INFECTION - ADULT  Goal: Absence or prevention of progression during hospitalization  Description: INTERVENTIONS:  - Assess and monitor for signs and symptoms of infection  - Monitor lab/diagnostic results  - Monitor all insertion sites, i.e. indwelling lines, tubes, and drains  - Monitor endotracheal if appropriate and nasal secretions for changes in amount and color  - [place] appropriate cooling/warming therapies per order  - Administer medications as ordered  - Instruct and encourage patient and family to use good hand hygiene technique  - Identify and instruct in appropriate isolation precautions for identified infection/condition  Outcome: Progressing     Problem: SAFETY ADULT  Goal: Maintain or return to baseline ADL function  Description: INTERVENTIONS:  -  Assess patient's ability to carry out ADLs; assess patient's baseline for ADL function and identify physical deficits which impact ability to perform ADLs (bathing, care of mouth/teeth, toileting, grooming, dressing, etc.)  - Assess/evaluate cause of self-care deficits   - Assess range of motion  - Assess patient's mobility; develop plan if impaired  - Assess patient's need for assistive devices and provide as appropriate  - Encourage maximum independence but intervene and supervise when necessary  - Involve family in performance of ADLs  - Assess for home care  needs following discharge   - Consider OT consult to assist with ADL evaluation and planning for discharge  - Provide patient education as appropriate  Outcome: Progressing     Problem: DISCHARGE PLANNING  Goal: Discharge to home or other facility with appropriate resources  Description: INTERVENTIONS:  - Identify barriers to discharge w/patient and caregiver  - Arrange for needed discharge resources and transportation as appropriate  - Identify discharge learning needs (meds, wound care, etc.)  - Arrange for interpretive services to assist at discharge as needed  - Refer to Case Management Department for coordinating discharge planning if the patient needs post-hospital services based on physician/advanced practitioner order or complex needs related to functional status, cognitive ability, or social support system  Outcome: Progressing     Problem: GASTROINTESTINAL - ADULT  Goal: Minimal or absence of nausea and/or vomiting  Description: INTERVENTIONS:  - Administer IV fluids if ordered to ensure adequate hydration  - Maintain NPO status until nausea and vomiting are resolved  - Nasogastric tube if ordered  - Administer ordered antiemetic medications as needed  - Provide nonpharmacologic comfort measures as appropriate  - Advance diet as tolerated, if ordered  - Consider nutrition services referral to assist patient with adequate nutrition and appropriate food choices  Outcome: Progressing  Goal: Maintains or returns to baseline bowel function  Description: INTERVENTIONS:  - Assess bowel function  - Encourage oral fluids to ensure adequate hydration  - Administer IV fluids if ordered to ensure adequate hydration  - Administer ordered medications as needed  - Encourage mobilization and activity  - Consider nutritional services referral to assist patient with adequate nutrition and appropriate food choices  Outcome: Progressing     Problem: GENITOURINARY - ADULT  Goal: Maintains or returns to baseline urinary  function  Description: INTERVENTIONS:  - Assess urinary function  - Encourage oral fluids to ensure adequate hydration if ordered  - Administer IV fluids as ordered to ensure adequate hydration  - Administer ordered medications as needed  - Offer frequent toileting  - Follow urinary retention protocol if ordered  Outcome: Progressing     Problem: Nutrition/Hydration-ADULT  Goal: Nutrient/Hydration intake appropriate for improving, restoring or maintaining nutritional needs  Description: Monitor and assess patient's nutrition/hydration status for malnutrition. Collaborate with interdisciplinary team and initiate plan and interventions as ordered.  Monitor patient's weight and dietary intake as ordered or per policy. Utilize nutrition screening tool and intervene as necessary. Determine patient's food preferences and provide high-protein, high-caloric foods as appropriate.     INTERVENTIONS:  - Monitor oral intake, urinary output, labs, and treatment plans  - Assess nutrition and hydration status and recommend course of action  - Evaluate amount of meals eaten  - Assist patient with eating if necessary   - Allow adequate time for meals  - Recommend/ encourage appropriate diets, oral nutritional supplements, and vitamin/mineral supplements  - Order, calculate, and assess calorie counts as needed  - Recommend, monitor, and adjust tube feedings and TPN/PPN based on assessed needs  - Assess need for intravenous fluids  - Provide specific nutrition/hydration education as appropriate  - Include patient/family/caregiver in decisions related to nutrition  Outcome: Progressing

## 2025-01-22 NOTE — ASSESSMENT & PLAN NOTE
Samantha is a 60 y/o F presenting 4 weeks s/p removal hardware L2-S1, posterior fusion L5-S1, revision instrumentation L2-pelvis, bilateral S2 alar screws with Dr. Bills and Dr. Stevens (DOS 12/19/24) with concern for wound dehiscence.      Now s/p I&D lumbar spine. Doing well. OR cultures growing proteus in both the deep and superficial cultures. ID changed abx to IV ceftriaxone pending suseptibilities. ID recommending 6wks IV abx. Pt taken for ex lap for obstruction, continues to feel bloated. Both drains have been removed. Prevena applied to incision. Orthopedically stable for discharge. Proveena may need to be changed prior to discharge. Please reach out to the orthopedics team prior to discharge.    Plan:     WBAT all extremities  maintain Prevena   Regular diet  Continue abx per ID  ID consulted, appreciate recs   Picc line placement and 6wks iv ceftriaxone  Multimodal pain control  PT/OT

## 2025-01-22 NOTE — PROGRESS NOTES
Progress Note - Infectious Disease   Name: Samantha Rodriguez 59 y.o. female I MRN: [id number]  [facility] Date of Admission: 1/9/2025   Date of Service: 1/22/2025 I Hospital Day: 13    Assessment & Plan  Wound infection complicating hardware (HCC)  Presenting with delayed wound dehiscence in setting of recent fusion surgery as below.  Status post I&D 1/10/25.  Intraoperative findings notable for wound dehiscence to level of fascia which appeared intact.  Fascia was opened and deep space was explored.  Superficial and deep cultures grew Proteus.  Concern for HW involvement.    Continue ceftriaxone 2g IV Q24 through 2/21/25 to complete 6 weeks postop given concern for HW infection  Monitor closely while on antibiotics for toxicities including diarrhea, rash, cytopenias, or kidney injury  Check weekly CBC-diff, CMP while on IV antibiotics to monitor for toxicities.  D/C planning for STR  Outpatient follow-up with ID 2 weeks after D/C - office notified  D/C PICC after last dose of IV antibiotics  Given retained HW, will likely need suppressive antibiotics after IV course complete  Lumbar radiculopathy  Remote fusion L2-S1.  Underwent removal hardware L2-S1, posterior fusion L5-S1, revision instrumentation L2 to pelvis; application of bilateral S2 alar screws and bone grafting of the disc at L5-S1 12/19/24.  Complicated by infection as above.  RSV bronchitis  Patient tested positive on 1/3/25.  Mild URI symptoms, no O2 requirement.  Improved, off isolation.  Small bowel obstruction due to adhesions (HCC)  Recurrent.  Initially managed with NGT.  Now status post XLap, ALLISON 1/14.  Improved.    Ok for discharge from Infectious Disease service perspective.    Antibiotics:  Ceftriaxone  POD #11    Subjective   Patient sleeping soundly.  No events noted.  Offered no compliants to teams rounding earlier in the AM.       Objective :  Temp:  [97.4 °F (36.3 °C)-98.5 °F (36.9 °C)] 98.2 °F (36.8 °C)  HR:  [66-74] 66  BP:  (104-145)/(63-92) 114/74  Resp:  [16-18] 17  SpO2:  [96 %-99 %] 96 %  O2 Device: None (Room air)    General:  No acute distress  Psychiatric:  Sleeping  Pulmonary:  Normal respiratory excursion without accessory muscle use  Abdomen:  Soft, nondistended  Extremities:  No edema  Skin:  No rashes      Lab Results: I have reviewed the following results:  Results from last 7 days   Lab Units 01/22/25  0452 01/21/25  0543 01/20/25  0549   WBC Thousand/uL 5.74 3.99* 5.72   HEMOGLOBIN g/dL 7.9* 7.8* 7.9*   PLATELETS Thousands/uL 450* 432* 451*     Results from last 7 days   Lab Units 01/22/25  0452 01/21/25  0543 01/20/25  0549 01/19/25  0556 01/18/25  0512 01/17/25  0500 01/16/25  1018   SODIUM mmol/L 143 140 141   < > 142   < > 139   POTASSIUM mmol/L 3.7 3.6 3.6   < > 3.7   < > 3.1*   CHLORIDE mmol/L 106 105 105   < > 105   < > 103   CO2 mmol/L 28 32 29   < > 29   < > 28   BUN mg/dL 6 5 5   < > 4*   < > 6   CREATININE mg/dL 0.62 0.57* 0.50*   < > 0.50*   < > 0.53*   EGFR ml/min/1.73sq m 97 100 104   < > 104   < > 102   CALCIUM mg/dL 8.0* 7.8* 7.9*   < > 8.0*   < > 7.7*   AST U/L  --   --   --   --  11*  --  11*   ALT U/L  --   --   --   --  4*  --  4*   ALK PHOS U/L  --   --   --   --  111*  --  91   ALBUMIN g/dL  --   --   --   --  2.9*  --  2.9*    < > = values in this interval not displayed.             Results from last 7 days   Lab Units 01/18/25 0512   CRP mg/L 79.7*

## 2025-01-22 NOTE — CASE MANAGEMENT
Case Management Discharge Planning Note    Patient name Samantha Rodriguez  Location [facility] MRN [id number]  : 1965 Date 2025       Current Admission Date: 2025  Current Admission Diagnosis:Wound infection complicating hardware ([facility])   Patient Active Problem List    Diagnosis Date Noted Date Diagnosed    Wound infection complicating hardware ([facility]) 2025     Small bowel obstruction due to adhesions ([facility]) 2025     History of UTI 2025     Volume overload 2025     Urinary tract infection 2025     Wound of sacral region 2025     RSV bronchitis 2025     Acute blood loss anemia 2024     Pain associated with surgical procedure 2024     Toxic encephalopathy 2024     Severe episode of recurrent major depressive disorder, without psychotic features ([facility]) 2024     Chronic low back pain, unspecified back pain laterality, unspecified whether sciatica present 2024     Opioid-induced constipation 2024     Weight gain 2024     Numbness 2023     Memory loss 2023     Hemiplegia, post-stroke ([facility]) 2022     Anemia 2022     Hypokalemia 2022     Slow transit constipation 03/15/2022     CVA (cerebral vascular accident) ([facility]) 2022     Mixed hyperlipidemia 2021     History of CVA (cerebrovascular accident) 2021     Dysphagia 2020     Ambulatory dysfunction 2020     Status post insertion of spinal cord stimulator 2020     SIRS (systemic inflammatory response syndrome) ([facility]) 2020     Tardive dyskinesia 2020     MIMI (obstructive sleep apnea)      Class 2 obesity without serious comorbidity with body mass index (BMI) of 39.0 to 39.9 in adult 2020     Post laminectomy syndrome 10/07/2019     Bipolar I disorder, most recent episode depressed ([facility]) 2019 09/15/2023    Spinal stenosis of lumbar region with neurogenic claudication 2018      Lumbar radiculopathy 12/08/2017     Chronic pain disorder 02/28/2017     Lumbar spondylosis 10/31/2016     Generalized anxiety disorder with panic attacks 10/26/2016     Bipolar disorder (HCC) 06/18/2015     PTSD (post-traumatic stress disorder) 06/18/2015     Panic disorder without agoraphobia 06/18/2015     Tremor 06/12/2014     Migraine 05/27/2014     GERD without esophagitis 05/01/2014     Cognitive disorder 04/18/2014     Overactive bladder 09/26/2013     Mood insomnia (HCC) 01/31/2013     Urinary incontinence 09/24/2012     Vitamin D deficiency 09/18/2012     Fibromyalgia 09/14/2012     Essential hypertension 09/14/2012       LOS (days): 13  Geometric Mean LOS (GMLOS) (days): 5.5  Days to GMLOS:-7     OBJECTIVE:  Risk of Unplanned Readmission Score: 40.01   Current admission status: Inpatient   Preferred Pharmacy:   [place], [place] - [address] [place] [address]  Phone: [phone] Fax: [phone]    [facility]/pharmacy #[id number] - [place]ET[place] - [address]  [address] [facility] [address]  Phone: [phone] Fax: [phone]    [facility] [place]  [place] - [address]  [address]  [place] [address]  Phone: [phone] Fax: [phone]    [facility] [place], [place] - [address]  [address] [address]  Phone: [phone] Fax: [phone]    [facility] - [place] - [address]  [address]  [place] [address]  Phone: [phone] Fax: [phone]    Primary Care Provider: Glo Valente DO    Primary Insurance: [employer] MEDICARE [facility] RE[facility]  Secondary Insurance: [facility]    DISCHARGE DETAILS:    Discharge planning discussed with:: Patient  Freedom of Choice: Yes  Comments - Freedom of Choice: Discussed FOC  CM contacted family/caregiver?: Yes  Were  Treatment Team discharge recommendations reviewed with patient/caregiver?: Yes  Did patient/caregiver verbalize understanding of patient care needs?: N/A- going to facility  Were patient/caregiver advised of the risks associated with not following Treatment Team discharge recommendations?: Yes    Other Referral/Resources/Interventions Provided:  Interventions: SNF  Referral Comments: [facility] able to accept today pt in agreement with dcp.    Treatment Team Recommendation: Short Term Rehab  Discharge Destination Plan:: Short Term Rehab  Transport at Discharge : [facility] valente     Number/Name of Dispatcher: [employer]  Transported by (Company and Unit #): [employer]  ETA of Transport (Date): 01/22/25  ETA of Transport (Time): 1400    IMM Given (Date):: 01/22/25  IMM Given to:: Patient  IMM reviewed with patient, patient agrees with discharge determination.     Accepting Facility Name, City & State : [facility]  Receiving Facility/Agency Phone Number: [phone]  Facility/Agency Fax Number: [phone]

## 2025-01-22 NOTE — DISCHARGE INSTR - OTHER ORDERS
From surgery:  Ex=-Lap.  Keep incisional area clean and dry  Follow up in Surgery clinic in 1 week on a Monday,  call for appointment in the Surgery Clinic  Take pain medications and antibiotics as prescribed

## 2025-01-23 ENCOUNTER — TELEPHONE (OUTPATIENT)
Dept: INFECTIOUS DISEASES | Facility: CLINIC | Age: 60
End: 2025-01-23

## 2025-01-23 DIAGNOSIS — Z79.2 LONG TERM (CURRENT) USE OF ANTIBIOTICS: ICD-10-CM

## 2025-01-23 DIAGNOSIS — T84.7XXD HARDWARE COMPLICATING WOUND INFECTION, SUBSEQUENT ENCOUNTER: Primary | ICD-10-CM

## 2025-01-23 NOTE — TELEPHONE ENCOUNTER
Contacted [facility] in regards to patient to check on their well-being, as well as inform them of their upcoming ID follow-up appointment and lab work ordered per provider's instructions following patient's recent discharge.    No answer, left message for the Nurse in care of patient to confirm they've received the lab orders faxed over to their facility, to have those orders completed on a weekly basis, and inform them of the patient's upcoming ID follow-up appointment that is currently set to a Virtual Visit.    Provided call back number and encouraged them to please call back to confirm receiving lab orders faxed over, as well as provide info on how we will be connecting for Virtual Visit.    Will call back at a later time if call isn't returned.

## 2025-01-23 NOTE — PROGRESS NOTES
OPAT NOTE    AP ONLY CAMPUSES ARE: [place], [place], and [place].   In these cases, physician is only cosigning notes.    Supervising/Discharge provider: Inna Mcnair    Diagnosis: Wound Infection Complicating Hardware    Drug: Ceftriaxone    Dose/Route/Frequency: 2g IV Q24H    Labs/Frequency: CBCD, CMP weekly while on IV abx    End Date: 2/21; Remove PICC following final dose of abx    Infusion/VNA/SNF contact: [facility]    Next appointment: 1/30, 10:45am      MA assigned: Johnny Smith

## 2025-01-23 NOTE — UTILIZATION REVIEW
NOTIFICATION OF ADMISSION DISCHARGE   This is a Notification of Discharge from [facility]. Please be advised that this patient has been discharge from our facility. Below you will find the admission and discharge date and time including the patient’s disposition.   UTILIZATION REVIEW CONTACT:  Christiana Xiong  Utilization   Network Utilization Review Department  Phone: [phone] x carefully listen to the prompts. All voicemails are confidential.  Email: [email or web address]     ADMISSION INFORMATION  PRESENTATION DATE: 1/9/2025 11:58 PM  OBERVATION ADMISSION DATE: N/A  INPATIENT ADMISSION DATE: 1/9/25 11:58 PM   DISCHARGE DATE: 1/22/2025  2:36 PM   DISPOSITION:Non [facility] SNF/TCU/SNU    Network Utilization Review Department  ATTENTION: Please call with any questions or concerns to [phone] and carefully listen to the prompts so that you are directed to the right person. All voicemails are confidential.   For Discharge needs, contact Care Management DC Support Team at [phone] opt. 2  Send all requests for admission clinical reviews, approved or denied determinations and any other requests to dedicated fax number below belonging to the campus where the patient is receiving treatment. List of dedicated fax numbers for the Facilities:  FACILITY NAME UR FAX NUMBER   ADMISSION DENIALS (Administrative/Medical Necessity) [phone]   DISCHARGE SUPPORT TEAM ([facility]) [phone]   PARENT CHILD HEALTH (Maternity/NICU/Pediatrics) [phone]   [facility] [phone]   [facility] [phone]   [facility] [phone]   [facility] [phone]   [facility] [phone]   [facility] [phone]   [facility] [phone]   [facility]  [phone]   [facility] [phone]   [facility] [phone]   [facility] [phone]   [facility] [phone]

## 2025-01-24 NOTE — TELEPHONE ENCOUNTER
Yamilka Ceja/ [facility] [employer]    PHONE ; [phone]  for appt set up.     Confirmed pts V V appt / Yamilka states pts cell phone # for V V - [phone]    Yamilka said if pt needs to be seen in person / she can look at arranging/ if she doesn't hear back then V V is kept.

## 2025-01-28 ENCOUNTER — OFFICE VISIT (OUTPATIENT)
Dept: SURGERY | Facility: CLINIC | Age: 60
End: 2025-01-28

## 2025-01-28 VITALS — TEMPERATURE: 97.9 F

## 2025-01-28 DIAGNOSIS — Z98.890: Primary | ICD-10-CM

## 2025-01-28 PROCEDURE — 99024 POSTOP FOLLOW-UP VISIT: CPT | Performed by: SURGERY

## 2025-01-28 NOTE — PROGRESS NOTES
Name: Samantha Rodriguez      : 1965      MRN: [id number]  Encounter Provider: Rich Woodard MD  Encounter Date: 2025   Encounter department: [facility] GENERAL SURGERY [place]  :  Assessment & Plan  Status post laparotomy with lysis of adhesions  Overall doing well.  Staples were removed Steri-Strips applied.  She was asking about advancing her diet and I told her that would be up to the facility as far as puréed or regular diet goes.  Activity as she feels comfortable.  See her back here if needed.           History of Present Illness   HPI  Samantha Rodriguez is a 59 y.o. female who presents status post laparotomy for bowel obstruction where they performed lysis of adhesions.  No specific complaints at this time.  Notices her bowels a little bit big yet.      Review of Systems       Objective   Temp 97.9 °F (36.6 °C) (Temporal)      Physical Exam  Abdomen: Soft, nontender, midline incision healing well with staples in place.

## 2025-01-29 ENCOUNTER — HOSPITAL ENCOUNTER (OUTPATIENT)
Dept: RADIOLOGY | Facility: HOSPITAL | Age: 60
Discharge: HOME | End: 2025-01-29
Attending: ORTHOPAEDIC SURGERY
Payer: MEDICARE

## 2025-01-29 ENCOUNTER — OFFICE VISIT (OUTPATIENT)
Dept: OBGYN CLINIC | Facility: HOSPITAL | Age: 60
End: 2025-01-29

## 2025-01-29 VITALS — BODY MASS INDEX: 43.28 KG/M2 | WEIGHT: 220.46 LBS | HEIGHT: 60 IN

## 2025-01-29 DIAGNOSIS — R52 PAIN: ICD-10-CM

## 2025-01-29 DIAGNOSIS — R52 PAIN: Primary | ICD-10-CM

## 2025-01-29 PROCEDURE — 99024 POSTOP FOLLOW-UP VISIT: CPT | Performed by: ORTHOPAEDIC SURGERY

## 2025-01-29 PROCEDURE — 72100 X-RAY EXAM L-S SPINE 2/3 VWS: CPT

## 2025-01-29 NOTE — PROGRESS NOTES
POST OP NOTE     Samantha Rodriguez  here today s/p revision lumbar/pelvic fusion surgery on 12/19/24 with post operative infection s/p I&D on 1/10/25    Subjective: Preoperative work up with Dr. Stevens for low back pain associated with long segment fusion with pseudoarthrosis and ambulatory dysfunction.  Postoperatively the patient did well and was discharged to nursing facility and subsequently discharged home.  Samantha jaeger we presented to the hospital for diffuse body weakness and upper respiratory illness as well as.  Distal wound breakdown and concern for superficial infection.  After she was stabilized and treated for upper respiratory infection she was transferred to [facility] and underwent I&D.  Intraoperative cultures grew Proteus and infectious disease recommended prolonged IV antibiotics.  In addition patient developed a small bowel obstruction during hospital admission and underwent exploratory laparotomy with general surgery.  Patient presents today from nursing facility.  She reports she is overall doing okay with mild back soreness and some abdominal discomfort.  She notes she has been tolerating p.o. intake.  She reports that she has been receiving her antibiotics.  She denies any fever or chills.  She denies any numbness or tingling in bilateral lower extremities.      Objective:  Ht 5' (1.524 m)   Wt 100 kg (220 lb 7.4 oz)   BMI 43.06 kg/m²     Strength: At baseline with mild lower extremity weakness  Sensation: Intact     Upper portion incision healing extremely well.  There is some mild dehiscence of the lower portion of the wound with fibrinous material, no erythema, no active drainage - the dressing the patient had on was intact without any drainage. There is no appreciable effusion.    Calf: soft, non tender, no swelling or erythema     Imaging:  I have reviewed and independently visualized the imaging studies (01/29/25)  myself and my interpretation is the following: Instrumentation  in place and in appropriate alignment.    Assessment: s/p revision lumbar/pelvic fusion surgery on 12/19/24 with post operative infection s/p I&D on 1/10/25      Plan:  Staples were removed.   Nylon sutures were left in place.  Patient was instructed to do the following   -Able to walk as much as tolerated.  Continue with physical therapy while at rehab facility  -Take pain medication as prescribed if needed.    -Continue with IV antibiotics as prescribed by infectious disease  -Wet-to-dry dressings were applied in the office with instructions to change daily  -Patient will follow-up in 1 week for wound check and suture removal  Samantha Rodriguez was instructed to call the office with any concerns or questions.

## 2025-01-30 ENCOUNTER — TELEPHONE (OUTPATIENT)
Dept: INFECTIOUS DISEASES | Facility: CLINIC | Age: 60
End: 2025-01-30

## 2025-01-30 ENCOUNTER — TELEMEDICINE (OUTPATIENT)
Dept: INFECTIOUS DISEASES | Facility: CLINIC | Age: 60
End: 2025-01-30
Payer: MEDICARE

## 2025-01-30 VITALS
TEMPERATURE: 98 F | SYSTOLIC BLOOD PRESSURE: 134 MMHG | HEART RATE: 76 BPM | RESPIRATION RATE: 18 BRPM | DIASTOLIC BLOOD PRESSURE: 86 MMHG | HEIGHT: 63 IN | BODY MASS INDEX: 37.81 KG/M2 | WEIGHT: 213.4 LBS | OXYGEN SATURATION: 98 %

## 2025-01-30 DIAGNOSIS — T84.7XXD HARDWARE COMPLICATING WOUND INFECTION, SUBSEQUENT ENCOUNTER: Primary | ICD-10-CM

## 2025-01-30 DIAGNOSIS — M48.062 SPINAL STENOSIS OF LUMBAR REGION WITH NEUROGENIC CLAUDICATION: ICD-10-CM

## 2025-01-30 PROCEDURE — G2211 COMPLEX E/M VISIT ADD ON: HCPCS | Performed by: INTERNAL MEDICINE

## 2025-01-30 PROCEDURE — 99214 OFFICE O/P EST MOD 30 MIN: CPT | Performed by: INTERNAL MEDICINE

## 2025-01-30 RX ORDER — ARGININE/GLUTAMINE/CALCIUM BMB 7G-7G-1.5G
POWDER IN PACKET (EA) ORAL
COMMUNITY

## 2025-01-30 RX ORDER — GUAIFENESIN 200 MG/10ML
200 LIQUID ORAL 3 TIMES DAILY PRN
COMMUNITY

## 2025-01-30 RX ORDER — POLYETHYLENE GLYCOL 3350
POWDER (GRAM) MISCELLANEOUS
COMMUNITY

## 2025-01-30 RX ORDER — SIMETHICONE 80 MG
80 TABLET,CHEWABLE ORAL EVERY 6 HOURS PRN
COMMUNITY

## 2025-01-30 RX ORDER — GLY/DIMETH/PETROLAT,WHT/WATER
CREAM (GRAM) TOPICAL AS NEEDED
COMMUNITY

## 2025-01-30 RX ORDER — SODIUM CHLORIDE 9 MG/ML
INJECTION, SOLUTION INTRAVENOUS CONTINUOUS
COMMUNITY

## 2025-01-30 NOTE — TELEPHONE ENCOUNTER
Unable to leave voicemail on patient phone. Looking to see if she received link as [facility] currently without any way of connection.

## 2025-01-30 NOTE — PROGRESS NOTES
Name: Samantha Rodriguez      : 1965      MRN: [id number]  Encounter Provider: Inna Mcnair MD  Encounter Date: 2025   Encounter department: [facility]  :  Assessment & Plan  Hardware complicating wound infection, subsequent encounter  Presenting with delayed wound dehiscence in setting of recent fusion surgery as below.  Status post I&D 1/10/25.  Intraoperative findings notable for wound dehiscence to level of fascia which appeared intact.  Fascia was opened and deep space was explored.  Superficial and deep cultures grew Proteus.  Concern for HW involvement.     Continue ceftriaxone 2g IV Q24 through 25 to complete 6 weeks postop given concern for HW infection  Monitor closely while on antibiotics for toxicities including diarrhea, rash, cytopenias, or kidney injury  Check weekly CBC-diff, CMP while on IV antibiotics to monitor for toxicities.  Outpatient follow-up with me in 2 weeks  D/C PICC after last dose of IV antibiotics  Given retained HW, will likely need suppressive antibiotics after IV course complete - amoxicillin likely a reasonable option       Spinal stenosis of lumbar region with neurogenic claudication  Remote fusion L2-S1. Underwent removal hardware L2-S1, posterior fusion L5-S1, revision instrumentation L2 to pelvis; application of bilateral S2 alar screws and bone grafting of the disc at L5-S1 24. Complicated by infection as above.          Antibiotics:   Ceftriaxone  POD #19    History of Present Illness   Patient has no fever, chills, sweats; no nausea, vomiting, diarrhea; no cough, shortness of breath; no pain. No new symptoms.  Facility RN accompanies her to call.  Call somewhat limited by lack of sound on their end, but able to communciate through multiple Y/N questions.    ROS:  A complete review of systems is negative other than that noted above in the HPI.         Objective   /86 (BP Location: Left arm, Patient Position:  "Sitting, Cuff Size: Standard)   Pulse 76   Temp 98 °F (36.7 °C) (Temporal)   Resp 18   Ht 5' 3\" (1.6 m)   Wt 96.8 kg (213 lb 6.4 oz)   SpO2 98%   BMI 37.80 kg/m²      General:  No acute distress  Psychiatric:  Awake and alert  Pulmonary:  Normal respiratory excursion without accessory muscle use  Skin:  No rashes visible on head or neck    Lab Results: I have personally reviewed pertinent labs.  Lab Results   Component Value Date     07/27/2015    K 3.7 01/22/2025     01/22/2025    CO2 28 01/22/2025    ANIONGAP 7 07/27/2015    BUN 6 01/22/2025    CREATININE 0.62 01/22/2025    GLUCOSE 135 12/19/2024    GLUF 143 (H) 01/03/2025    CALCIUM 8.0 (L) 01/22/2025    CORRECTEDCA 8.9 01/18/2025    AST 11 (L) 01/18/2025    ALT 4 (L) 01/18/2025    ALKPHOS 111 (H) 01/18/2025    PROT 6.9 02/22/2014    BILITOT 0.51 02/22/2014    EGFR 97 01/22/2025     Lab Results   Component Value Date    WBC 5.74 01/22/2025    HGB 7.9 (L) 01/22/2025    HCT 26.9 (L) 01/22/2025    MCV 91 01/22/2025     (H) 01/22/2025     Lab Results   Component Value Date    ESR 81 (H) 01/03/2025           Administrative Statements   Encounter provider Inna Mcnair MD    The Patient is located at Other and in the following state in which I hold an active license [place].    The patient was identified by name and date of birth. Samantha Rodriguez was informed that this is a telemedicine visit and that the visit is being conducted through the Epic Embedded platform. She agrees to proceed..  My office door was closed. No one else was in the room.  She acknowledged consent and understanding of privacy and security of the video platform. The patient has agreed to participate and understands they can discontinue the visit at any time.    I have spent a total time of 10 minutes in caring for this patient on the day of the visit/encounter including Diagnostic results, Instructions for management, and Impressions.  "

## 2025-02-05 ENCOUNTER — OFFICE VISIT (OUTPATIENT)
Dept: OBGYN CLINIC | Facility: HOSPITAL | Age: 60
End: 2025-02-05

## 2025-02-05 VITALS — WEIGHT: 213.41 LBS | BODY MASS INDEX: 37.81 KG/M2 | HEIGHT: 63 IN

## 2025-02-05 DIAGNOSIS — M43.26 FUSION OF LUMBAR SPINE: ICD-10-CM

## 2025-02-05 DIAGNOSIS — T81.30XA WOUND DEHISCENCE: Primary | ICD-10-CM

## 2025-02-05 PROCEDURE — 99024 POSTOP FOLLOW-UP VISIT: CPT | Performed by: ORTHOPAEDIC SURGERY

## 2025-02-06 PROBLEM — N39.0 URINARY TRACT INFECTION: Status: RESOLVED | Noted: 2025-01-07 | Resolved: 2025-02-06

## 2025-02-07 ENCOUNTER — OFFICE VISIT (OUTPATIENT)
Dept: OBGYN CLINIC | Facility: HOSPITAL | Age: 60
End: 2025-02-07

## 2025-02-07 VITALS — WEIGHT: 213.41 LBS | HEIGHT: 63 IN | BODY MASS INDEX: 37.81 KG/M2

## 2025-02-07 DIAGNOSIS — T81.30XA WOUND DEHISCENCE: Primary | ICD-10-CM

## 2025-02-07 PROCEDURE — 99024 POSTOP FOLLOW-UP VISIT: CPT | Performed by: ORTHOPAEDIC SURGERY

## 2025-02-07 NOTE — PROGRESS NOTES
"POST OP NOTE     Samantha Rodriguez  here today s/p revision lumbar/pelvic fusion surgery on 12/19/24 with post operative infection s/p I&D on 1/10/25    Subjective: Preoperative work up with Dr. Stevens for low back pain associated with long segment fusion with pseudoarthrosis and ambulatory dysfunction.  Postoperatively the patient did well and was discharged to nursing facility and subsequently discharged home.  Samantha jaeger we presented to the hospital for diffuse body weakness and upper respiratory illness as well as.  Distal wound breakdown and concern for superficial infection.  After she was stabilized and treated for upper respiratory infection she was transferred to [facility] and underwent I&D.  Intraoperative cultures grew Proteus and infectious disease recommended prolonged IV antibiotics.  In addition patient developed a small bowel obstruction during hospital admission and underwent exploratory laparotomy with general surgery.  Patient presents today from nursing facility.  She reports she is overall doing okay with mild back soreness and some abdominal discomfort.  She notes she has been tolerating p.o. intake.  She reports that she has been receiving her antibiotics.  She denies any fever or chills.  She denies any numbness or tingling in bilateral lower extremities.      2/5/2025 update  Patient returns for a wound check.  She reports that her nursing facility has been doing daily dressing changes.  She denies any worsening back pain and has been able to participate in physical therapy with some ambulation and transfers under her own power per her report.  She also notes that she continues to receive her antibiotics as prescribed.    Objective:  Ht 5' 3\" (1.6 m)   Wt 96.8 kg (213 lb 6.5 oz)   BMI 37.80 kg/m²     Strength: At baseline with mild lower extremity weakness  Sensation: Intact     Upper portion incision healing extremely well.  There is some mild dehiscence of the lower portion of " the wound with fibrinous material, no erythema, no active drainage - the dressing the patient had on was intact without any drainage.  The inferior portion of the wound with dehiscence was probed and it does not go deep to the fascia , there is no appreciable effusion.    Calf: soft, non tender, no swelling or erythema     Imaging:  I have reviewed and independently visualized the imaging studies (02/07/25)  myself and my interpretation is the following: No imaging today    Assessment: s/p revision lumbar/pelvic fusion surgery on 12/19/24 with post operative infection s/p I&D on 1/10/25      Plan:  Nylon sutures were removed   Patient was instructed to do the following   -Able to walk as much as tolerated.  Continue with physical therapy while at rehab facility  -Take pain medication as prescribed if needed.    -Continue with IV antibiotics as prescribed by infectious disease  -Calcium alginate dressing was applied with instructions provided to the nursing facility for daily changes  -Patient will follow-up on Friday for wound check  Samantha Rodriguez was instructed to call the office with any concerns or questions.

## 2025-02-08 PROBLEM — J20.5 RSV BRONCHITIS: Status: RESOLVED | Noted: 2025-01-04 | Resolved: 2025-02-08

## 2025-02-09 PROBLEM — Z87.440 HISTORY OF UTI: Status: RESOLVED | Noted: 2025-01-09 | Resolved: 2025-02-09

## 2025-02-10 ENCOUNTER — OFFICE VISIT (OUTPATIENT)
Dept: OBGYN CLINIC | Facility: HOSPITAL | Age: 60
End: 2025-02-10

## 2025-02-10 VITALS — HEIGHT: 63 IN | BODY MASS INDEX: 37.81 KG/M2 | WEIGHT: 213.41 LBS

## 2025-02-10 DIAGNOSIS — T81.30XA WOUND DEHISCENCE: Primary | ICD-10-CM

## 2025-02-10 PROCEDURE — 99024 POSTOP FOLLOW-UP VISIT: CPT | Performed by: ORTHOPAEDIC SURGERY

## 2025-02-10 NOTE — PROGRESS NOTES
"POST OP NOTE     Samantha Rodriguez  here today s/p revision lumbar/pelvic fusion surgery on 12/19/24 with post operative infection s/p I&D on 1/10/25    Subjective: Preoperative work up with Dr. Stevens for low back pain associated with long segment fusion with pseudoarthrosis and ambulatory dysfunction.  Postoperatively the patient did well and was discharged to nursing facility and subsequently discharged home.  Samantha jaeger we presented to the hospital for diffuse body weakness and upper respiratory illness as well as.  Distal wound breakdown and concern for superficial infection.  After she was stabilized and treated for upper respiratory infection she was transferred to [facility] and underwent I&D.  Intraoperative cultures grew Proteus and infectious disease recommended prolonged IV antibiotics.  In addition patient developed a small bowel obstruction during hospital admission and underwent exploratory laparotomy with general surgery.  Patient presents today from nursing facility.  She reports she is overall doing okay with mild back soreness and some abdominal discomfort.  She notes she has been tolerating p.o. intake.  She reports that she has been receiving her antibiotics.  She denies any fever or chills.  She denies any numbness or tingling in bilateral lower extremities.      2/5/2025 update  Patient returns for a wound check.  She reports that her nursing facility has been doing daily dressing changes.  She denies any worsening back pain and has been able to participate in physical therapy with some ambulation and transfers under her own power per her report.  She also notes that she continues to receive her antibiotics as prescribed.    2/10/25- Wound check  Patient returns for a wound check. She reports that the nursing facility has not always been compliant with dressing changes and often she feels that they \"do not not know they are doing.\" She does report improving strength, has been working " "with physical therapy. She is continuing to take her antibiotics as prescribed.     Objective:  Ht 5' 3\" (1.6 m)   Wt 96.8 kg (213 lb 6.5 oz)   BMI 37.80 kg/m²     Strength: At baseline with mild lower extremity weakness  Sensation: Intact     Upper portion incision healing extremely well.  There is some mild dehiscence of the lower portion of the wound with fibrinous material, the wound appears to be filling in with granulation tissue  Calf: soft, non tender, no swelling or erythema     Imaging:  I have reviewed and independently visualized the imaging studies (02/11/25)  myself and my interpretation is the following: No imaging today    Assessment: s/p revision lumbar/pelvic fusion surgery on 12/19/24 with post operative infection s/p I&D on 1/10/25    Plan:  Patient was instructed to do the following   -Able to walk as much as tolerated.  Continue with physical therapy while at rehab facility  -Take pain medication as prescribed if needed.    -Continue with IV antibiotics as prescribed by infectious disease  -Calcium alginate dressing was applied with instructions provided to the nursing facility for daily changes  -Patient will follow-up on Friday for wound check  Samantha Rodriguez was instructed to call the office with any concerns or questions.      "

## 2025-02-11 NOTE — PROGRESS NOTES
"POST OP NOTE     Samantha Rodriguez  here today s/p revision lumbar/pelvic fusion surgery on 12/19/24 with post operative infection s/p I&D on 1/10/25    Subjective: Preoperative work up with Dr. Stevens for low back pain associated with long segment fusion with pseudoarthrosis and ambulatory dysfunction.  Postoperatively the patient did well and was discharged to nursing facility and subsequently discharged home.  Samantha jaeger we presented to the hospital for diffuse body weakness and upper respiratory illness as well as.  Distal wound breakdown and concern for superficial infection.  After she was stabilized and treated for upper respiratory infection she was transferred to [facility] and underwent I&D.  Intraoperative cultures grew Proteus and infectious disease recommended prolonged IV antibiotics.  In addition patient developed a small bowel obstruction during hospital admission and underwent exploratory laparotomy with general surgery.  Patient presents today from nursing facility.  She reports she is overall doing okay with mild back soreness and some abdominal discomfort.  She notes she has been tolerating p.o. intake.  She reports that she has been receiving her antibiotics.  She denies any fever or chills.  She denies any numbness or tingling in bilateral lower extremities.      2/5/2025 update  Patient returns for a wound check.  She reports that her nursing facility has been doing daily dressing changes.  She denies any worsening back pain and has been able to participate in physical therapy with some ambulation and transfers under her own power per her report.  She also notes that she continues to receive her antibiotics as prescribed.    2/7/2025 update  Patient returns for wound check.  She reports her back pain and function have steadily improved.  She continues to receive her antibiotics.  She denies any fever or chills.    Objective:  Ht 5' 3\" (1.6 m)   Wt 96.8 kg (213 lb 6.5 oz)   BMI 37.80 " kg/m²     Strength: At baseline with mild lower extremity weakness  Sensation: Intact     Upper portion incision healing extremely well.  There is some mild dehiscence of the lower portion of the wound with fibrinous material, no erythema, no active drainage - the dressing the patient had on was intact without any drainage.  The inferior portion of the wound with dehiscence appears to have some increased granulation tissue, there is no appreciable effusion.    Calf: soft, non tender, no swelling or erythema     Imaging:  I have reviewed and independently visualized the imaging studies (02/11/25)  myself and my interpretation is the following: No imaging today    Assessment: s/p revision lumbar/pelvic fusion surgery on 12/19/24 with post operative infection s/p I&D on 1/10/25      Plan:  Patient was instructed to do the following   -Able to walk as much as tolerated.  Continue with physical therapy while at rehab facility  -Take pain medication as prescribed if needed.    -Continue with IV antibiotics as prescribed by infectious disease  -Calcium alginate dressing was applied with instructions provided to the nursing facility for daily changes  -Patient will follow-up on Monday for wound check  Samantha Rodriguez was instructed to call the office with any concerns or questions.

## 2025-02-13 ENCOUNTER — OFFICE VISIT (OUTPATIENT)
Dept: INFECTIOUS DISEASES | Facility: CLINIC | Age: 60
End: 2025-02-13
Payer: MEDICARE

## 2025-02-13 VITALS
TEMPERATURE: 96.7 F | OXYGEN SATURATION: 98 % | SYSTOLIC BLOOD PRESSURE: 116 MMHG | DIASTOLIC BLOOD PRESSURE: 75 MMHG | HEART RATE: 61 BPM | BODY MASS INDEX: 37.73 KG/M2 | WEIGHT: 213 LBS

## 2025-02-13 DIAGNOSIS — T84.7XXD HARDWARE COMPLICATING WOUND INFECTION, SUBSEQUENT ENCOUNTER: Primary | ICD-10-CM

## 2025-02-13 DIAGNOSIS — T81.89XA NON-HEALING SURGICAL WOUND, INITIAL ENCOUNTER: ICD-10-CM

## 2025-02-13 DIAGNOSIS — M48.062 SPINAL STENOSIS OF LUMBAR REGION WITH NEUROGENIC CLAUDICATION: ICD-10-CM

## 2025-02-13 PROCEDURE — G2211 COMPLEX E/M VISIT ADD ON: HCPCS | Performed by: INTERNAL MEDICINE

## 2025-02-13 PROCEDURE — 99214 OFFICE O/P EST MOD 30 MIN: CPT | Performed by: INTERNAL MEDICINE

## 2025-02-13 RX ORDER — OXYCODONE HYDROCHLORIDE 5 MG/1
5 CAPSULE ORAL EVERY 4 HOURS PRN
COMMUNITY

## 2025-02-13 RX ORDER — ONDANSETRON 4 MG/1
4 TABLET, FILM COATED ORAL EVERY 6 HOURS PRN
COMMUNITY

## 2025-02-13 NOTE — ASSESSMENT & PLAN NOTE
Presenting with delayed wound dehiscence in setting of recent fusion surgery as below.  Status post I&D 1/10/25.  Intraoperative findings notable for wound dehiscence to level of fascia which appeared intact.  Fascia was opened and deep space was explored.  Superficial and deep cultures grew Proteus.  Concern for HW involvement.     Continue ceftriaxone 2g IV Q24 through 2/21/25 to complete 6 weeks postop given concern for HW infection  Given retained HW, will likely need to start Amoxicillin 500 mg PO Q12 as chronic suppressive antibiotics after IV course complete to continue indefinitely  Monitor closely while on antibiotics for toxicities including diarrhea, rash, cytopenias, or kidney injury  Check weekly CBC-diff, CMP while on IV antibiotics to monitor for toxicities.  D/C PICC after last dose of IV antibiotics  Outpatient follow-up with me in 1 month  Office RN changed PICC dressing in office today

## 2025-02-13 NOTE — PATIENT INSTRUCTIONS
Continue ceftriaxone 2g IV Q24 through 2/21/25 to complete 6 weeks postop given concern for HW infection  Given retained HW, will likely need to start Amoxicillin 500 mg PO Q12 as chronic suppressive antibiotics after IV course complete to continue indefinitely  Monitor closely while on antibiotics for toxicities including diarrhea, rash, cytopenias, or kidney injury  Check weekly CBC-diff, CMP while on IV antibiotics to monitor for toxicities.  D/C PICC after last dose of IV antibiotics  Outpatient follow-up with me in 1 month  Office RN changed PICC dressing in office today

## 2025-02-13 NOTE — PROGRESS NOTES
Name: Samantha Rodriguez      : 1965      MRN: [id number]  Encounter Provider: Inna Mcnair MD  Encounter Date: 2025   Encounter department: [facility]  :  Assessment & Plan  Hardware complicating wound infection, subsequent encounter  Presenting with delayed wound dehiscence in setting of recent fusion surgery as below.  Status post I&D 1/10/25.  Intraoperative findings notable for wound dehiscence to level of fascia which appeared intact.  Fascia was opened and deep space was explored.  Superficial and deep cultures grew Proteus.  Concern for HW involvement.     Continue ceftriaxone 2g IV Q24 through 25 to complete 6 weeks postop given concern for HW infection  Given retained HW, will likely need to start Amoxicillin 500 mg PO Q12 as chronic suppressive antibiotics after IV course complete to continue indefinitely  Monitor closely while on antibiotics for toxicities including diarrhea, rash, cytopenias, or kidney injury  Check weekly CBC-diff, CMP while on IV antibiotics to monitor for toxicities.  D/C PICC after last dose of IV antibiotics  Outpatient follow-up with me in 1 month  Office RN changed PICC dressing in office today       Spinal stenosis of lumbar region with neurogenic claudication  Remote fusion L2-S1. Underwent removal hardware L2-S1, posterior fusion L5-S1, revision instrumentation L2 to pelvis; application of bilateral S2 alar screws and bone grafting of the disc at L5-S1 24. Complicated by infection as above.        Non-healing surgical wound, initial encounter  Followed by Ortho.  Appears superficial, no acute superinfeciton.       Antibiotics:   Ceftriaxone  POD #33    History of Present Illness   Patient has no fever, chills, sweats; no nausea, vomiting, diarrhea; no cough, shortness of breath; no pain. No new symptoms.    ROS:  A complete review of systems is negative other than that noted above in the HPI.         Objective   There  were no vitals taken for this visit.     General:  No acute distress  Psychiatric:  Awake and alert  Pulmonary:  Normal respiratory excursion without accessory muscle use  Abdomen:  Soft, nontender  Extremities:  LUE PICC clean, dressing peeling off  Skin:  Clean wound.          Lab Results: I have personally reviewed pertinent labs.  Lab Results   Component Value Date     07/27/2015    K 3.7 01/22/2025     01/22/2025    CO2 28 01/22/2025    ANIONGAP 7 07/27/2015    BUN 6 01/22/2025    CREATININE 0.62 01/22/2025    GLUCOSE 135 12/19/2024    GLUF 143 (H) 01/03/2025    CALCIUM 8.0 (L) 01/22/2025    CORRECTEDCA 8.9 01/18/2025    AST 11 (L) 01/18/2025    ALT 4 (L) 01/18/2025    ALKPHOS 111 (H) 01/18/2025    PROT 6.9 02/22/2014    BILITOT 0.51 02/22/2014    EGFR 97 01/22/2025     Lab Results   Component Value Date    WBC 5.74 01/22/2025    HGB 7.9 (L) 01/22/2025    HCT 26.9 (L) 01/22/2025    MCV 91 01/22/2025     (H) 01/22/2025     Lab Results   Component Value Date    ESR 81 (H) 01/03/2025     Labs 2/7:  WBC 4.0, Plts 354, ANC 2700, Cr 0.3, LFTs normal           Delaney Busby

## 2025-02-14 ENCOUNTER — OFFICE VISIT (OUTPATIENT)
Dept: OBGYN CLINIC | Facility: HOSPITAL | Age: 60
End: 2025-02-14

## 2025-02-14 VITALS — HEIGHT: 63 IN | WEIGHT: 212.96 LBS | BODY MASS INDEX: 37.73 KG/M2

## 2025-02-14 DIAGNOSIS — M43.26 FUSION OF LUMBAR SPINE: Primary | ICD-10-CM

## 2025-02-14 DIAGNOSIS — R26.2 AMBULATORY DYSFUNCTION: ICD-10-CM

## 2025-02-14 DIAGNOSIS — Z98.1 S/P LUMBAR FUSION: ICD-10-CM

## 2025-02-14 PROCEDURE — 99024 POSTOP FOLLOW-UP VISIT: CPT | Performed by: ORTHOPAEDIC SURGERY

## 2025-02-14 NOTE — PROGRESS NOTES
"POST OP NOTE     Samantha Rodriguez  here today s/p revision lumbar/pelvic fusion surgery on 12/19/24 with post operative infection s/p I&D on 1/10/25    Subjective: Preoperative work up with Dr. Stevens for low back pain associated with long segment fusion with pseudoarthrosis and ambulatory dysfunction.  Postoperatively the patient did well and was discharged to nursing facility and subsequently discharged home.  Samantha jaeger we presented to the hospital for diffuse body weakness and upper respiratory illness as well as.  Distal wound breakdown and concern for superficial infection.  After she was stabilized and treated for upper respiratory infection she was transferred to [facility] and underwent I&D.  Intraoperative cultures grew Proteus and infectious disease recommended prolonged IV antibiotics.  In addition patient developed a small bowel obstruction during hospital admission and underwent exploratory laparotomy with general surgery.  Patient presents today from nursing facility.  She reports she is overall doing okay with mild back soreness and some abdominal discomfort.  She notes she has been tolerating p.o. intake.  She reports that she has been receiving her antibiotics.  She denies any fever or chills.  She denies any numbness or tingling in bilateral lower extremities.      2/5/2025 update  Patient returns for a wound check.  She reports that her nursing facility has been doing daily dressing changes.  She denies any worsening back pain and has been able to participate in physical therapy with some ambulation and transfers under her own power per her report.  She also notes that she continues to receive her antibiotics as prescribed.    2/10/25- Wound check  Patient returns for a wound check. She reports that the nursing facility has not always been compliant with dressing changes and often she feels that they \"do not not know they are doing.\" She does report improving strength, has been working " "with physical therapy. She is continuing to take her antibiotics as prescribed.     2/14/2025 - Wound check  Patient returns for a wound check. She continues to work with physical therapy, notes some upper back soreness especially after working with PT. She is continuing to take her antibiotics as prescribed.       Objective:  Ht 5' 3\" (1.6 m)   Wt 96.6 kg (212 lb 15.4 oz)   BMI 37.72 kg/m²     Strength: At baseline with mild lower extremity weakness  Sensation: Intact     Upper portion incision healing well.  There is some mild dehiscence of the lower portion of the wound with fibrinous material, the wound appears to be filling in with granulation tissue. No drainage or purulence.  Calf: soft, non tender, no swelling or erythema     Imaging:  I have reviewed and independently visualized the imaging studies (02/14/25)  myself and my interpretation is the following: No imaging today    Assessment: s/p revision lumbar/pelvic fusion surgery on 12/19/24 with post operative infection s/p I&D on 1/10/25    Plan:  Patient was instructed to do the following   -Able to walk as much as tolerated.  Continue with physical therapy while at rehab facility  -Take pain medication as prescribed if needed.    -Continue with IV antibiotics as prescribed by infectious disease  -Calcium alginate dressing was applied with instructions provided to the nursing facility for daily changes  -Order placed for shower chair and rolling walker  -Patient will follow-up in 1 week for wound check  Samantha Rodriguez was instructed to call the office with any concerns or questions.  "

## 2025-02-21 ENCOUNTER — OFFICE VISIT (OUTPATIENT)
Dept: OBGYN CLINIC | Facility: HOSPITAL | Age: 60
End: 2025-02-21

## 2025-02-21 VITALS — HEIGHT: 63 IN | WEIGHT: 212.96 LBS | BODY MASS INDEX: 37.73 KG/M2

## 2025-02-21 DIAGNOSIS — Z98.1 S/P LUMBAR FUSION: Primary | ICD-10-CM

## 2025-02-21 PROCEDURE — 99024 POSTOP FOLLOW-UP VISIT: CPT | Performed by: ORTHOPAEDIC SURGERY

## 2025-02-21 NOTE — PROGRESS NOTES
"POST OP NOTE     Samantha Rodriguez  here today s/p revision lumbar/pelvic fusion surgery on 12/19/24 with post operative infection s/p I&D on 1/10/25    Subjective: Preoperative work up with Dr. Stevens for low back pain associated with long segment fusion with pseudoarthrosis and ambulatory dysfunction.  Postoperatively the patient did well and was discharged to nursing facility and subsequently discharged home.  Samantha jaeger we presented to the hospital for diffuse body weakness and upper respiratory illness as well as.  Distal wound breakdown and concern for superficial infection.  After she was stabilized and treated for upper respiratory infection she was transferred to [facility] and underwent I&D.  Intraoperative cultures grew Proteus and infectious disease recommended prolonged IV antibiotics.  In addition patient developed a small bowel obstruction during hospital admission and underwent exploratory laparotomy with general surgery.  Patient presents today from nursing facility.  She reports she is overall doing okay with mild back soreness and some abdominal discomfort.  She notes she has been tolerating p.o. intake.  She reports that she has been receiving her antibiotics.  She denies any fever or chills.  She denies any numbness or tingling in bilateral lower extremities.      2/5/2025 update  Patient returns for a wound check.  She reports that her nursing facility has been doing daily dressing changes.  She denies any worsening back pain and has been able to participate in physical therapy with some ambulation and transfers under her own power per her report.  She also notes that she continues to receive her antibiotics as prescribed.    2/10/25- Wound check  Patient returns for a wound check. She reports that the nursing facility has not always been compliant with dressing changes and often she feels that they \"do not not know they are doing.\" She does report improving strength, has been working " "with physical therapy. She is continuing to take her antibiotics as prescribed.     2/14/2025 - Wound check  Patient returns for a wound check. She continues to work with physical therapy, notes some upper back soreness especially after working with PT. She is continuing to take her antibiotics as prescribed.     2/21/2025  -Wound check  Patient returns for a wound check. Continues with some upper back soreness especially after increased activity and working with PT. Has been ambulating with a walker. She is continuing to take her antibiotics as prescribed.       Objective:  Ht 5' 3\" (1.6 m)   Wt 96.6 kg (212 lb 15.4 oz)   BMI 37.72 kg/m²     Strength: At baseline with mild lower extremity weakness  Sensation: Intact    Upper portion incision healing well.  There is some mild dehiscence of the lower portion of the wound with fibrinous material, the wound appears to be filling in with granulation tissue. Continues with interval improvement in wound appearance and healing. No drainage or purulence.  Calf: soft, non tender, no swelling or erythema     Imaging:  I have reviewed and independently visualized the imaging studies (02/21/25)  myself and my interpretation is the following: No imaging today    Assessment: s/p revision lumbar/pelvic fusion surgery on 12/19/24 with post operative infection s/p I&D on 1/10/25    Plan:  Patient was instructed to do the following   -Able to walk as much as tolerated.  Continue with physical therapy while at rehab facility  -Take pain medication as prescribed if needed.    -Continue with IV antibiotics as prescribed by infectious disease  -Calcium alginate dressing was applied with instructions provided to the nursing facility for daily changes. Continue dry dressing changes as needed.  -Patient will follow-up in 2 weeks for wound check  Samantha Rodriguez was instructed to call the office with any concerns or questions.  "

## 2025-03-04 ENCOUNTER — TELEPHONE (OUTPATIENT)
Age: 60
End: 2025-03-04

## 2025-03-04 DIAGNOSIS — T84.7XXD HARDWARE COMPLICATING WOUND INFECTION, SUBSEQUENT ENCOUNTER: Primary | ICD-10-CM

## 2025-03-04 NOTE — TELEPHONE ENCOUNTER
Called and left message for patient today.   Reminded patient of upcoming appointment and to have labs done prior so results can be gone over at the appointment.   Informed patient if there are any further questions to call the office.

## 2025-03-04 NOTE — TELEPHONE ENCOUNTER
Called [place] and spoke with Emma today.   Informed Emma I was calling as patient has follow up appointment next week. Informed Emma patient has labs ordered prior to appointment. Informed Emma will fax orders at this time.   Informed Emma if there are any further questions to call the office.   Emma verbalizes understanding at this time.

## 2025-03-07 ENCOUNTER — OFFICE VISIT (OUTPATIENT)
Dept: OBGYN CLINIC | Facility: HOSPITAL | Age: 60
End: 2025-03-07

## 2025-03-07 ENCOUNTER — HOSPITAL ENCOUNTER (OUTPATIENT)
Dept: RADIOLOGY | Facility: HOSPITAL | Age: 60
Discharge: HOME | End: 2025-03-07
Attending: ORTHOPAEDIC SURGERY
Payer: MEDICARE

## 2025-03-07 VITALS — WEIGHT: 212.96 LBS | BODY MASS INDEX: 37.73 KG/M2 | HEIGHT: 63 IN

## 2025-03-07 DIAGNOSIS — Z98.1 S/P LUMBAR FUSION: Primary | ICD-10-CM

## 2025-03-07 DIAGNOSIS — Z98.1 S/P LUMBAR FUSION: ICD-10-CM

## 2025-03-07 PROCEDURE — 72100 X-RAY EXAM L-S SPINE 2/3 VWS: CPT

## 2025-03-07 PROCEDURE — 99024 POSTOP FOLLOW-UP VISIT: CPT | Performed by: ORTHOPAEDIC SURGERY

## 2025-03-07 NOTE — PROGRESS NOTES
"POST OP NOTE     Samantha Rodriguez  here today s/p revision lumbar/pelvic fusion surgery on 12/19/24 with post operative infection s/p I&D on 1/10/25    Subjective: Preoperative work up with Dr. Stevens for low back pain associated with long segment fusion with pseudoarthrosis and ambulatory dysfunction.  Postoperatively the patient did well and was discharged to nursing facility and subsequently discharged home.  Samantha jaeger we presented to the hospital for diffuse body weakness and upper respiratory illness as well as.  Distal wound breakdown and concern for superficial infection.  After she was stabilized and treated for upper respiratory infection she was transferred to [facility] and underwent I&D.  Intraoperative cultures grew Proteus and infectious disease recommended prolonged IV antibiotics.  In addition patient developed a small bowel obstruction during hospital admission and underwent exploratory laparotomy with general surgery.  Patient presents today from nursing facility.  She reports she is overall doing okay with mild back soreness and some abdominal discomfort.  She notes she has been tolerating p.o. intake.  She reports that she has been receiving her antibiotics.  She denies any fever or chills.  She denies any numbness or tingling in bilateral lower extremities.      2/5/2025 update  Patient returns for a wound check.  She reports that her nursing facility has been doing daily dressing changes.  She denies any worsening back pain and has been able to participate in physical therapy with some ambulation and transfers under her own power per her report.  She also notes that she continues to receive her antibiotics as prescribed.    2/10/25- Wound check  Patient returns for a wound check. She reports that the nursing facility has not always been compliant with dressing changes and often she feels that they \"do not not know they are doing.\" She does report improving strength, has been working " "with physical therapy. She is continuing to take her antibiotics as prescribed.     2/14/2025 - Wound check  Patient returns for a wound check. She continues to work with physical therapy, notes some upper back soreness especially after working with PT. She is continuing to take her antibiotics as prescribed.     2/21/2025  -Wound check  Patient returns for a wound check. Continues with some upper back soreness especially after increased activity and working with PT. Has been ambulating with a walker. She is continuing to take her antibiotics as prescribed.     3/7/2025 - Wound check  Patient returns for a wound check.  Continues w/ abx.  Reports she is walking at rehab.  Continues with imrpoved standing endurance.  No fever/chills.         Objective:  Ht 5' 3\" (1.6 m)   Wt 96.6 kg (212 lb 15.4 oz)   BMI 37.72 kg/m²     Strength: At baseline with mild lower extremity weakness  Sensation: Intact    Upper portion incision healing well.  There is some mild dehiscence of the lower portion of the wound with fibrinous material, the wound appears to be filling in with granulation tissue. Continues with interval improvement in wound appearance and healing. No drainage or purulence.  Calf: soft, non tender, no swelling or erythema     Imaging:  I have reviewed and independently visualized the imaging studies (03/07/25)  myself and my interpretation is the following: intact hardware, no changes     Assessment: s/p revision lumbar/pelvic fusion surgery on 12/19/24 with post operative infection s/p I&D on 1/10/25    Plan:  Patient was instructed to do the following   -Able to walk as much as tolerated.  Continue with physical therapy while at rehab facility  -Take pain medication as prescribed if needed.    -Continue with IV antibiotics as prescribed by infectious disease  -Calcium alginate dressing was applied with instructions provided to the nursing facility for daily changes. Continue dry dressing changes as " needed.  -Patient will follow-up in 2 weeks for wound check  Samantha Rodriguez was instructed to call the office with any concerns or questions.

## 2025-03-13 ENCOUNTER — TELEPHONE (OUTPATIENT)
Dept: INFECTIOUS DISEASES | Facility: CLINIC | Age: 60
End: 2025-03-13

## 2025-03-13 ENCOUNTER — OFFICE VISIT (OUTPATIENT)
Dept: INFECTIOUS DISEASES | Facility: CLINIC | Age: 60
End: 2025-03-13
Payer: MEDICARE

## 2025-03-13 VITALS
DIASTOLIC BLOOD PRESSURE: 74 MMHG | BODY MASS INDEX: 35.71 KG/M2 | SYSTOLIC BLOOD PRESSURE: 112 MMHG | HEART RATE: 81 BPM | OXYGEN SATURATION: 99 % | WEIGHT: 201.6 LBS | TEMPERATURE: 96.9 F

## 2025-03-13 DIAGNOSIS — M48.062 SPINAL STENOSIS OF LUMBAR REGION WITH NEUROGENIC CLAUDICATION: ICD-10-CM

## 2025-03-13 DIAGNOSIS — T81.89XD NON-HEALING SURGICAL WOUND, SUBSEQUENT ENCOUNTER: ICD-10-CM

## 2025-03-13 DIAGNOSIS — T84.7XXD HARDWARE COMPLICATING WOUND INFECTION, SUBSEQUENT ENCOUNTER: Primary | ICD-10-CM

## 2025-03-13 PROCEDURE — G2211 COMPLEX E/M VISIT ADD ON: HCPCS | Performed by: INTERNAL MEDICINE

## 2025-03-13 PROCEDURE — 99213 OFFICE O/P EST LOW 20 MIN: CPT | Performed by: INTERNAL MEDICINE

## 2025-03-13 RX ORDER — AMOXICILLIN 500 MG/1
500 CAPSULE ORAL EVERY 12 HOURS SCHEDULED
COMMUNITY

## 2025-03-13 RX ORDER — DOCUSATE SODIUM 100 MG/1
100 CAPSULE, LIQUID FILLED ORAL 2 TIMES DAILY
COMMUNITY

## 2025-03-13 NOTE — ASSESSMENT & PLAN NOTE
Presenting with delayed wound dehiscence in setting of recent fusion surgery as below. Status post I&D 1/10/25. Intraoperative findings notable for wound dehiscence to level of fascia which appeared intact. Fascia was opened and deep space was explored. Superficial and deep cultures grew Proteus. Concern for HW involvement.   Status post 6 weeks IV antibiotics through 2/21/25.  Now on chronic PO suppression.  Recent Xray 3/7 shows stable HW.    Continue Amoxicillin 500 mg PO Q12 as chronic suppression, to continue indefinitely  Check CBC-Diff, Cr monthly  RTO 3 months

## 2025-03-13 NOTE — PROGRESS NOTES
Name: Samantha Rodriguez      : 1965      MRN: [id number]  Encounter Provider: Inna Mcnair MD  Encounter Date: 3/13/2025   Encounter department: [facility]  :  Assessment & Plan  Hardware complicating wound infection, subsequent encounter  Presenting with delayed wound dehiscence in setting of recent fusion surgery as below. Status post I&D 1/10/25. Intraoperative findings notable for wound dehiscence to level of fascia which appeared intact. Fascia was opened and deep space was explored. Superficial and deep cultures grew Proteus. Concern for HW involvement.   Status post 6 weeks IV antibiotics through 25.  Now on chronic PO suppression.  Recent Xray 3/7 shows stable HW.    Continue Amoxicillin 500 mg PO Q12 as chronic suppression, to continue indefinitely  Check CBC-Diff, Cr monthly  RTO 3 months       Non-healing surgical wound, subsequent encounter  Followed by Ortho. Appears superficial, no acute superinfeciton.        Spinal stenosis of lumbar region with neurogenic claudication  Remote fusion L2-S1. Underwent removal hardware L2-S1, posterior fusion L5-S1, revision instrumentation L2 to pelvis; application of bilateral S2 alar screws and bone grafting of the disc at L5-S1 24. Complicated by infection as above.            Antibiotics:   Amoxicillin    History of Present Illness   Patient has no fever, chills, sweats; no nausea, vomiting, diarrhea; no cough, shortness of breath; no pain. No new symptoms.    ROS:  A complete review of systems is negative other than that noted above in the HPI.         Objective   There were no vitals taken for this visit.     General:  No acute distress  Psychiatric:  Awake and alert  Pulmonary:  Normal respiratory excursion without accessory muscle use  Abdomen:  Soft, nontender  Extremities:  No edema  Skin:  Slightly smaller, fibrinous based wound lower left back; no malodor, cellulitis or purulence    Lab Results: I have  personally reviewed pertinent labs.  Lab Results   Component Value Date     07/27/2015    K 3.7 01/22/2025     01/22/2025    CO2 28 01/22/2025    ANIONGAP 7 07/27/2015    BUN 6 01/22/2025    CREATININE 0.62 01/22/2025    GLUCOSE 135 12/19/2024    GLUF 143 (H) 01/03/2025    CALCIUM 8.0 (L) 01/22/2025    CORRECTEDCA 8.9 01/18/2025    AST 11 (L) 01/18/2025    ALT 4 (L) 01/18/2025    ALKPHOS 111 (H) 01/18/2025    PROT 6.9 02/22/2014    BILITOT 0.51 02/22/2014    EGFR 97 01/22/2025     Lab Results   Component Value Date    WBC 5.74 01/22/2025    HGB 7.9 (L) 01/22/2025    HCT 26.9 (L) 01/22/2025    MCV 91 01/22/2025     (H) 01/22/2025     Lab Results   Component Value Date    ESR 81 (H) 01/03/2025     Labs 3/11/25 HNL:  WBC 3.6, ANC 2200, Plts 283,; 3/7:  Cr 0.7, LFTs normal

## 2025-03-17 ENCOUNTER — TELEPHONE (OUTPATIENT)
Dept: NEUROLOGY | Facility: CLINIC | Age: 60
End: 2025-03-17

## 2025-03-17 NOTE — TELEPHONE ENCOUNTER
Called patient to confirm upcoming appointment on 3/27/25 with Raymond James in the [place] office.  LMOM - sent reminder letter

## 2025-03-21 ENCOUNTER — OFFICE VISIT (OUTPATIENT)
Dept: OBGYN CLINIC | Facility: HOSPITAL | Age: 60
End: 2025-03-21

## 2025-03-21 VITALS — WEIGHT: 201.5 LBS | HEIGHT: 63 IN | BODY MASS INDEX: 35.7 KG/M2

## 2025-03-21 DIAGNOSIS — Z98.1 S/P LUMBAR FUSION: Primary | ICD-10-CM

## 2025-03-21 PROCEDURE — 99024 POSTOP FOLLOW-UP VISIT: CPT | Performed by: ORTHOPAEDIC SURGERY

## 2025-03-25 NOTE — PROGRESS NOTES
"POST OP NOTE     Samantha Rodriguez  here today s/p revision lumbar/pelvic fusion surgery on 12/19/24 with post operative infection s/p I&D on 1/10/25    Subjective: Preoperative work up with Dr. Stevens for low back pain associated with long segment fusion with pseudoarthrosis and ambulatory dysfunction.  Postoperatively the patient did well and was discharged to nursing facility and subsequently discharged home.  Samantha jaeger we presented to the hospital for diffuse body weakness and upper respiratory illness as well as.  Distal wound breakdown and concern for superficial infection.  After she was stabilized and treated for upper respiratory infection she was transferred to [facility] and underwent I&D.  Intraoperative cultures grew Proteus and infectious disease recommended prolonged IV antibiotics.  In addition patient developed a small bowel obstruction during hospital admission and underwent exploratory laparotomy with general surgery.  Patient presents today from nursing facility.  She reports she is overall doing okay with mild back soreness and some abdominal discomfort.  She notes she has been tolerating p.o. intake.  She reports that she has been receiving her antibiotics.  She denies any fever or chills.  She denies any numbness or tingling in bilateral lower extremities.      2/5/2025 update  Patient returns for a wound check.  She reports that her nursing facility has been doing daily dressing changes.  She denies any worsening back pain and has been able to participate in physical therapy with some ambulation and transfers under her own power per her report.  She also notes that she continues to receive her antibiotics as prescribed.    2/10/25- Wound check  Patient returns for a wound check. She reports that the nursing facility has not always been compliant with dressing changes and often she feels that they \"do not not know they are doing.\" She does report improving strength, has been working " "with physical therapy. She is continuing to take her antibiotics as prescribed.     2/14/2025 - Wound check  Patient returns for a wound check. She continues to work with physical therapy, notes some upper back soreness especially after working with PT. She is continuing to take her antibiotics as prescribed.     2/21/2025  -Wound check  Patient returns for a wound check. Continues with some upper back soreness especially after increased activity and working with PT. Has been ambulating with a walker. She is continuing to take her antibiotics as prescribed.     3/7/2025 - Wound check  Patient returns for a wound check.  Continues w/ abx.  Reports she is walking at rehab.  Continues with imrpoved standing endurance.  No fever/chills.     3/25/2025 - Wound check  Patient returns for a wound check.  Continues w/ abx.  No fever/chills.  Overall Samantha states she is doing well.          Objective:  Ht 5' 3\" (1.6 m)   Wt 91.4 kg (201 lb 8 oz)   BMI 35.69 kg/m²     Strength: At baseline with mild lower extremity weakness  Sensation: Intact    Upper portion incision healing well.  Continues with interval improvement in wound appearance and healing. No drainage or purulence.  Very small area at distal wound filling in with granulation tissue.  Calf: soft, non tender, no swelling or erythema       Assessment: s/p revision lumbar/pelvic fusion surgery on 12/19/24 with post operative infection s/p I&D on 1/10/25    Plan:  Patient was instructed to do the following   -Able to walk as much as tolerated.  Continue with physical therapy while at rehab facility  -Take pain medication as prescribed if needed.    -Continue with IV antibiotics as prescribed by infectious disease  -Calcium alginate dressing was applied with instructions provided to the nursing facility for daily changes. Continue dry dressing changes as needed.  -Patient will follow-up in 4 weeks for wound check  Samantha Rodriguez was instructed to call the office with " any concerns or questions.

## 2025-03-27 ENCOUNTER — OFFICE VISIT (OUTPATIENT)
Dept: NEUROLOGY | Facility: CLINIC | Age: 60
End: 2025-03-27
Payer: MEDICARE

## 2025-03-27 VITALS
DIASTOLIC BLOOD PRESSURE: 82 MMHG | TEMPERATURE: 96.8 F | HEIGHT: 63 IN | SYSTOLIC BLOOD PRESSURE: 128 MMHG | BODY MASS INDEX: 35.61 KG/M2 | HEART RATE: 78 BPM | OXYGEN SATURATION: 100 % | WEIGHT: 201 LBS

## 2025-03-27 DIAGNOSIS — Z86.73 HISTORY OF STROKE: Primary | ICD-10-CM

## 2025-03-27 DIAGNOSIS — G24.01 TARDIVE DYSKINESIA: ICD-10-CM

## 2025-03-27 PROCEDURE — 99213 OFFICE O/P EST LOW 20 MIN: CPT | Performed by: NURSE PRACTITIONER

## 2025-03-27 NOTE — PROGRESS NOTES
Name: Samantha Rodriguez      : 1965      MRN: [id number]  Encounter Provider: MERCY Moore  Encounter Date: 3/27/2025   Encounter department: [facility] [place]  :  Assessment & Plan  History of stroke         Tardive dyskinesia           See consultation report  No new neurological findings- no repeat stroke like symptoms or seizure like activity; she complains of continued tardive dyskinesia. She is to continue aspirin and lipitor for stroke prevention and speak with psychiatry about continued management of tardive dyskinesia. Follow up in 6 months time.    History of Present Illness   Samantha presents from [facility] for follow up, last office visit 2024; 20 minutes late. She is in a wheelchair. Since last office visit she was admitted to behavioral health unit in November and then she did have low back surgery in 2024 with small bowel obstruction and  superficial infection complication after with I&D done in January-she has continued follow up with orthopedics and is on continued amoxicillin. She states continued tardive dyskinesia symptoms which are bothersome to her-she is on cogentin in addition to 60mg ingrezza. Labs and EKG reviewed- LDL 42, - repeat echocardiogram was done without significant findings/no change from prior.     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  migraine   she does have family history of seizures.      EEG 48 hour  3/23/2023:  Interpretation:   This is an abnormal day 2 of 2 days (24 hours without video) continuous ambulatory EEG due to excessive diffuse theta activity in the waking background.  This finding is etiologically nonspecific for mild diffuse cerebral dysfunction, which may in part be due to psychotropic medications.  There are no epileptiform discharges over the course of more than 47 hours ambulatory EEG    She was admitted 7/18-7/20/2023 with numbness/stroke like symptoms; CT/CTA were normal and her symptoms resolved and the risk for stroke was felt to be low-more likely functional/anxiety related. Since that time she did have admissions to  unit in September and October 2023 and states she did go to a personal care home from November to January 2024 but didn't like it so now she is back living with family. She arrives alone today. She states her Tardive dyskinesia symptoms are worse and requests higher dose of ingrezza (previously lowered b/c of high Qtc- last qtc was 448 which was much improved). She does still complain of memory loss- MOCA today is 17/30 compared to 21/30 from before. She states her anxiety was under better control when she was in the facility on a higher dose of klonopin and requests this today.     HPI   Review of Systems   Constitutional: Negative.    HENT: Negative.     Eyes: Negative.    Respiratory: Negative.     Cardiovascular: Negative.    Gastrointestinal: Negative.    Endocrine: Negative.    Genitourinary: Negative.    Musculoskeletal: Negative.    Skin: Negative.    Allergic/Immunologic: Negative.    Neurological: Negative.    Hematological: Negative.    Psychiatric/Behavioral: Negative.     All other systems reviewed and are negative.   I have personally reviewed the MA's review of systems and made changes as necessary.    Current Outpatient Medications on File Prior to Visit   Medication Sig Dispense Refill    acetaminophen (TYLENOL) 325 mg tablet Take 2 tablets (650 mg total)  by mouth every 4 (four) hours as needed for moderate pain      amoxicillin (AMOXIL) 500 mg capsule Take 500 mg by mouth every 12 (twelve) hours      ARIPiprazole (ABILIFY) 10 mg tablet Take 1 tablet (10 mg total) by mouth daily 30 tablet 0    aspirin (Aspirin Low Dose) 81 mg EC tablet Take 1 tablet (81 mg total) by mouth daily 30 tablet 0    atorvastatin (LIPITOR) 40 mg tablet Take 1 tablet (40 mg total) by mouth daily with dinner 30 tablet 0    benztropine (COGENTIN) 1 mg tablet Take 1 tablet (1 mg total) by mouth 2 (two) times a day 60 tablet 0    bisacodyl (FLEET) 10 MG/30ML ENEM Insert 10 mg into the rectum once      busPIRone (BUSPAR) 15 mg tablet Take 1 tablet (15 mg total) by mouth 3 (three) times a day 90 tablet 0    Cholecalciferol (VITAMIN D3) 1,000 units tablet Take 1 tablet (1,000 Units total) by mouth daily 30 tablet 0    Diclofenac Sodium (VOLTAREN) 1 % Apply 2 g topically 4 (four) times a day as needed (pain) 150 g 3    docusate sodium (COLACE) 100 mg capsule Take 100 mg by mouth 2 (two) times a day      Emollient (cetaphil) cream Apply topically as needed for dry skin      guaiFENesin (ROBITUSSIN) 100 MG/5ML oral liquid Take 200 mg by mouth 3 (three) times a day as needed for cough      hydrOXYzine HCL (ATARAX) 25 mg tablet Take 1 tablet (25 mg total) by mouth 3 (three) times a day 90 tablet 0    lidocaine (LIDODERM) 5 % Apply 1 patch topically over 12 hours daily Remove & Discard patch within 12 hours or as directed by MD 10 patch 0    loratadine (CLARITIN) 10 mg tablet Take 1 tablet (10 mg total) by mouth daily 30 tablet 0    lubiprostone (AMITIZA) 8 mcg capsule Take 1 capsule (8 mcg total) by mouth 2 (two) times a day 60 capsule 0    magnesium Oxide (MAG-OX) 400 mg TABS Take 1 tablet (400 mg total) by mouth daily 30 tablet 0    methocarbamol (ROBAXIN) 500 mg tablet Take 1 tablet (500 mg total) by mouth every 6 (six) hours 30 tablet 0    Nutritional Supplements (Anthony) PACK Take by mouth       ondansetron (ZOFRAN) 4 mg tablet Take 4 mg by mouth every 6 (six) hours as needed for nausea or vomiting      oxyCODONE (OXY-IR) 5 MG capsule Take 5 mg by mouth every 4 (four) hours as needed for moderate pain      pantoprazole (PROTONIX) 40 mg tablet Take 1 tablet (40 mg total) by mouth daily in the early morning 30 tablet 0    Polyethylene Glycol 3350 POWD Give 17 gram by mouth as needed for constipation. Mix with 4-8 oz suitable liquids. May give daily.      prazosin (MINIPRESS) 2 mg capsule Take 1 capsule (2 mg total) by mouth daily at bedtime 30 capsule 0    pregabalin (LYRICA) 150 mg capsule Take 1 capsule (150 mg total) by mouth 3 (three) times a day 90 capsule 0    sertraline (ZOLOFT) 100 mg tablet Take 2 tablets (200 mg total) by mouth daily at bedtime 60 tablet 0    simethicone (MYLICON) 80 mg chewable tablet Chew 80 mg every 6 (six) hours as needed for flatulence      sodium chloride Inject into a catheter in a vein continuous Use 10 cc intravenously every shift for unused lumen      sodium chloride Inject into a catheter in a vein continuous Use 5 cc intravenously every day shift for before and after med administration.      tirzepatide (Zepbound) 2.5 mg/0.5 mL auto-injector Inject 2.5 mg under the skin Once a week      traZODone (DESYREL) 300 MG tablet Take 1 tablet (300 mg total) by mouth daily at bedtime 30 tablet 0    Valbenazine Tosylate (Ingrezza) 60 MG CAPS Take 1 capsule by mouth in the morning 30 capsule 0    amLODIPine (NORVASC) 5 mg tablet Take 1 tablet (5 mg total) by mouth daily 30 tablet 0    tuberculin 5 units/0.1 mL Inject 5 Units into the skin once (Patient not taking: Reported on 2/13/2025)       No current facility-administered medications on file prior to visit.      Social History     Tobacco Use    Smoking status: Never    Smokeless tobacco: Never   Vaping Use    Vaping status: Never Used   Substance and Sexual Activity    Alcohol use: Not Currently     Comment: 2 x year; being a  "social drinker as per all scripts     Drug use: No    Sexual activity: Not Currently        Objective   /82 (Patient Position: Sitting, Cuff Size: Standard)   Pulse 78   Temp (!) 96.8 °F (36 °C) (Temporal)   Ht 5' 3\" (1.6 m)   Wt 91.2 kg (201 lb)   SpO2 100%   BMI 35.61 kg/m²     Physical Exam  Vitals reviewed.   Constitutional:       General: She is not in acute distress.  HENT:      Head: Normocephalic.      Right Ear: External ear normal.      Left Ear: External ear normal.      Nose: Nose normal.      Mouth/Throat:      Mouth: Mucous membranes are moist.   Eyes:      Extraocular Movements: Extraocular movements intact.      Pupils: Pupils are equal, round, and reactive to light.   Cardiovascular:      Rate and Rhythm: Normal rate.      Pulses: Normal pulses.   Pulmonary:      Effort: Pulmonary effort is normal.   Abdominal:      General: There is no distension.      Tenderness: There is no abdominal tenderness.   Musculoskeletal:      Right lower leg: No edema.      Left lower leg: No edema.   Skin:     Findings: No rash.      Comments: Dressing to back clean/intact   Neurological:      Mental Status: She is alert. Mental status is at baseline.      Gait: Abnormal gait: did not test.      Comments: Abnormal facial movements  4+/5 Left sided weakness-no change  Speech is difficult to understand at times-this is chronic   Psychiatric:         Mood and Affect: Mood normal.       Neurological Exam  Mental Status  Alert.    Cranial Nerves  CN III, IV, VI: Extraocular movements intact bilaterally. Pupils equal round and reactive to light bilaterally.    Gait   Abnormal gait: did not test.  Abnormal facial movements  4+/5 Left sided weakness-no change  Speech is difficult to understand at times-this is chronic.        "

## 2025-04-22 ENCOUNTER — OFFICE VISIT (OUTPATIENT)
Dept: GASTROENTEROLOGY | Facility: MEDICAL CENTER | Age: 60
End: 2025-04-22
Payer: MEDICARE

## 2025-04-22 VITALS
SYSTOLIC BLOOD PRESSURE: 120 MMHG | TEMPERATURE: 98 F | BODY MASS INDEX: 35.61 KG/M2 | HEART RATE: 62 BPM | HEIGHT: 63 IN | WEIGHT: 201 LBS | DIASTOLIC BLOOD PRESSURE: 83 MMHG

## 2025-04-22 DIAGNOSIS — K21.9 GASTROESOPHAGEAL REFLUX DISEASE WITHOUT ESOPHAGITIS: Primary | ICD-10-CM

## 2025-04-22 DIAGNOSIS — R10.13 EPIGASTRIC PAIN: ICD-10-CM

## 2025-04-22 PROCEDURE — 99214 OFFICE O/P EST MOD 30 MIN: CPT | Performed by: NURSE PRACTITIONER

## 2025-04-22 RX ORDER — SODIUM CHLORIDE, SODIUM LACTATE, POTASSIUM CHLORIDE, CALCIUM CHLORIDE 600; 310; 30; 20 MG/100ML; MG/100ML; MG/100ML; MG/100ML
125 INJECTION, SOLUTION INTRAVENOUS CONTINUOUS
OUTPATIENT
Start: 2025-04-22

## 2025-04-22 NOTE — PROGRESS NOTES
"Name: Samantha Rodriguez      : 1965      MRN: [id number]  Encounter Provider: MERCY Moreland  Encounter Date: 2025   Encounter department: [facility]  :  Assessment & Plan  Gastroesophageal reflux disease without esophagitis  Does report that she does get heartburn reflux if she does not take her PPI on a daily basis.  No nausea, vomiting or dysphagia.  Continue PPI daily.  Will need procedure in a hospital setting due to her comorbidities.  Orders:    EGD; Future  Continue PPI daily  Antireflux diet  Epigastric pain   She is currently on Amitiza 8 mcg twice daily and MiraLAX 1 capful daily.  Reports her BMs are 1-2 times per day and soft.  Feels like she is \"completely evacuating.\"  No melena or hematochezia.  She is here today reporting left upper quadrant and epigastric pain that can occur at any time but is usually worse after eating any type of food.  She currently is in a skilled nursing facility.  Reports she had an ultrasound of the abdomen recently which noted gallstones.  Report unavailable during visit today.  Will obtain results of recent ultrasound.  Will rule out PUD, gastritis/esophagitis.  If EGD is normal would need follow-up with surgeon.    Orders:    EGD; Future  Continue PPI daily  Antireflux diet  Follow-up in office after testing      History of Present Illness   Samantha Rodriguez is a 59 y.o. female who presents for follow-up.  She was last seen by Dr. Jaiyeola  for GERD, constipation, general abdominal pain and family history of colon cancer.    HPI    She has a history of chronic reflux symptoms and previously underwent EGD in  showing a tortuous esophagus, otherwise unremarkable.  She then underwent pH testing and manometry testing showing normal esophageal motility and pH testing was positive for acid reflux.  She is taking pantoprazole once daily with poorly controlled reflux symptoms.         CT abdomen pelvis " "1/25-persistent partial small bowel obstruction with transition point in the left mid abdomen, findings suspicious for left para duodenal internal hernia.  Time course has proven that the obstruction is incomplete as there is persistent gas and stool in the colon and previously administered oral contrast has passed through the small bowel.  No evidence for small bowel wall compromise and chart review shows normal lactate levels.  Mild increased wall thickening at the 1st and 2nd portions of the duodenum.  Correlate with epigastric symptoms to suggest active peptic disease.  Decreasing rim enhancement of posterior subcutaneous collection associated with wound dehiscence.      XR abdomen obstruction series 1/25- Worsening small bowel distention, particularly in the left abdomen, with air-fluid levels suspicious for obstruction. As noted on the CT from 1/9/2025, this may be secondary to internal hernia.     Labs 1/25-BMP normal other than calcium 8.0, hemoglobin 7.9, MCV 91, platelets 450.  Hemoglobin baseline is 7.5-9.0.    Interval history: 1/14 status post ex lap.  ALLISON of obstructive pelvic adhesions.  Operative findings suggestive of adhesive distal obstruction and possible closed-loop obstruction.  She is currently on Amitiza 8 mcg twice daily and MiraLAX 1 capful daily.  Reports her BMs are 1-2 times per day and soft.  Feels like she is \"completely evacuating.\"  No melena or hematochezia.  She is here today reporting left upper quadrant and epigastric pain that can occur at any time but is usually worse after eating any type of food.  She currently is in a skilled nursing facility.  Reports she had an ultrasound of the abdomen recently which noted gallstones.  Report unavailable during visit today.  Does report that she does get heartburn reflux if she does not take her PPI on a daily basis.  No nausea, vomiting or dysphagia.  She was seen by a surgeon 1/25 for her partial bowel obstruction.    Prior " EGD/colonoscopy   EGD 3/24-3 cm hiatal hernia.  Normal esophagus and duodenum.  Moderate gastritis.  Biopsies were unremarkable.    Colonoscopy 3/24-2 cm polyps in the transverse colon removed.  Scattered diverticulosis of mild severity in the sigmoid colon.  Repeat colonoscopy in 5 years.  Biopsies noted tubular adenomas.    2014 EGD was normal  2014 colonoscopy showed 1 small polyp and a poor prep and she was recommended repeat in 5 years      Review of Systems   Constitutional:  Negative for chills and fever.   HENT:  Negative for ear pain and sore throat.    Eyes:  Negative for pain and visual disturbance.   Respiratory:  Negative for cough and shortness of breath.    Cardiovascular:  Negative for chest pain and palpitations.   Gastrointestinal:  Positive for abdominal pain. Negative for vomiting.   Genitourinary:  Negative for dysuria and hematuria.   Musculoskeletal:  Negative for arthralgias and back pain.   Skin:  Negative for color change and rash.   Neurological:  Negative for seizures and syncope.   All other systems reviewed and are negative.   A complete review of systems is negative other than that noted above in the HPI.      Current Outpatient Medications   Medication Sig Dispense Refill    acetaminophen (TYLENOL) 325 mg tablet Take 2 tablets (650 mg total) by mouth every 4 (four) hours as needed for moderate pain      amLODIPine (NORVASC) 5 mg tablet Take 1 tablet (5 mg total) by mouth daily 30 tablet 0    amoxicillin (AMOXIL) 500 mg capsule Take 500 mg by mouth every 12 (twelve) hours      ARIPiprazole (ABILIFY) 10 mg tablet Take 1 tablet (10 mg total) by mouth daily 30 tablet 0    aspirin (Aspirin Low Dose) 81 mg EC tablet Take 1 tablet (81 mg total) by mouth daily 30 tablet 0    atorvastatin (LIPITOR) 40 mg tablet Take 1 tablet (40 mg total) by mouth daily with dinner 30 tablet 0    benztropine (COGENTIN) 1 mg tablet Take 1 tablet (1 mg total) by mouth 2 (two) times a day 60 tablet 0    bisacodyl  (FLEET) 10 MG/30ML ENEM Insert 10 mg into the rectum once      busPIRone (BUSPAR) 15 mg tablet Take 1 tablet (15 mg total) by mouth 3 (three) times a day 90 tablet 0    Cholecalciferol (VITAMIN D3) 1,000 units tablet Take 1 tablet (1,000 Units total) by mouth daily 30 tablet 0    Diclofenac Sodium (VOLTAREN) 1 % Apply 2 g topically 4 (four) times a day as needed (pain) 150 g 3    docusate sodium (COLACE) 100 mg capsule Take 100 mg by mouth 2 (two) times a day      Emollient (cetaphil) cream Apply topically as needed for dry skin      guaiFENesin (ROBITUSSIN) 100 MG/5ML oral liquid Take 200 mg by mouth 3 (three) times a day as needed for cough      hydrOXYzine HCL (ATARAX) 25 mg tablet Take 1 tablet (25 mg total) by mouth 3 (three) times a day 90 tablet 0    lidocaine (LIDODERM) 5 % Apply 1 patch topically over 12 hours daily Remove & Discard patch within 12 hours or as directed by MD 10 patch 0    loratadine (CLARITIN) 10 mg tablet Take 1 tablet (10 mg total) by mouth daily 30 tablet 0    lubiprostone (AMITIZA) 8 mcg capsule Take 1 capsule (8 mcg total) by mouth 2 (two) times a day 60 capsule 0    magnesium Oxide (MAG-OX) 400 mg TABS Take 1 tablet (400 mg total) by mouth daily 30 tablet 0    methocarbamol (ROBAXIN) 500 mg tablet Take 1 tablet (500 mg total) by mouth every 6 (six) hours 30 tablet 0    Nutritional Supplements (Anthony) PACK Take by mouth      ondansetron (ZOFRAN) 4 mg tablet Take 4 mg by mouth every 6 (six) hours as needed for nausea or vomiting      oxyCODONE (OXY-IR) 5 MG capsule Take 5 mg by mouth every 4 (four) hours as needed for moderate pain      pantoprazole (PROTONIX) 40 mg tablet Take 1 tablet (40 mg total) by mouth daily in the early morning 30 tablet 0    Polyethylene Glycol 3350 POWD Give 17 gram by mouth as needed for constipation. Mix with 4-8 oz suitable liquids. May give daily.      prazosin (MINIPRESS) 2 mg capsule Take 1 capsule (2 mg total) by mouth daily at bedtime 30 capsule 0     pregabalin (LYRICA) 150 mg capsule Take 1 capsule (150 mg total) by mouth 3 (three) times a day 90 capsule 0    sertraline (ZOLOFT) 100 mg tablet Take 2 tablets (200 mg total) by mouth daily at bedtime 60 tablet 0    simethicone (MYLICON) 80 mg chewable tablet Chew 80 mg every 6 (six) hours as needed for flatulence      sodium chloride Inject into a catheter in a vein continuous Use 10 cc intravenously every shift for unused lumen      sodium chloride Inject into a catheter in a vein continuous Use 5 cc intravenously every day shift for before and after med administration.      tirzepatide (Zepbound) 2.5 mg/0.5 mL auto-injector Inject 2.5 mg under the skin Once a week      traZODone (DESYREL) 300 MG tablet Take 1 tablet (300 mg total) by mouth daily at bedtime 30 tablet 0    tuberculin 5 units/0.1 mL Inject 5 Units into the skin once (Patient not taking: Reported on 2/13/2025)      Valbenazine Tosylate (Ingrezza) 60 MG CAPS Take 1 capsule by mouth in the morning 30 capsule 0     No current facility-administered medications for this visit.     Objective   There were no vitals taken for this visit.    Physical Exam  Vitals and nursing note reviewed.   Constitutional:       General: She is not in acute distress.     Appearance: She is well-developed.   HENT:      Head: Normocephalic and atraumatic.   Eyes:      Conjunctiva/sclera: Conjunctivae normal.   Cardiovascular:      Rate and Rhythm: Normal rate and regular rhythm.      Heart sounds: No murmur heard.  Pulmonary:      Effort: Pulmonary effort is normal. No respiratory distress.      Breath sounds: Normal breath sounds.   Abdominal:      General: Bowel sounds are normal.      Palpations: Abdomen is soft.      Tenderness: There is no abdominal tenderness.   Musculoskeletal:         General: No swelling.      Cervical back: Neck supple.   Skin:     General: Skin is warm and dry.      Capillary Refill: Capillary refill takes less than 2 seconds.   Neurological:       Mental Status: She is alert and oriented to person, place, and time.   Psychiatric:         Mood and Affect: Mood normal.            Lab Results: I personally reviewed relevant lab results.       Results for orders placed during the hospital encounter of 03/20/24    EGD    Impression  3 cm type I hiatal hernia  Normal esophagus.  Moderate edematous and erythematous mucosa in the body of the stomach, incisura and antrum; performed cold forceps biopsy  The duodenum appeared normal.    RECOMMENDATION:  Follow up with GI Clinic  Await pathology results    Can consider changing PPI's if the increased dose of Pantoprazole is not effective or consideration of surgery given hiatal hernia and pos Ph test for reflux.  Proceed with colonoscopy now.      Kasey Rhodes, DO

## 2025-04-28 ENCOUNTER — OFFICE VISIT (OUTPATIENT)
Dept: OBGYN CLINIC | Facility: HOSPITAL | Age: 60
End: 2025-04-28
Payer: MEDICARE

## 2025-04-28 VITALS — HEIGHT: 63 IN | WEIGHT: 201.06 LBS | BODY MASS INDEX: 35.62 KG/M2

## 2025-04-28 DIAGNOSIS — Z98.1 S/P LUMBAR FUSION: Primary | ICD-10-CM

## 2025-04-28 DIAGNOSIS — M43.26 FUSION OF LUMBAR SPINE: ICD-10-CM

## 2025-04-28 PROCEDURE — 99213 OFFICE O/P EST LOW 20 MIN: CPT | Performed by: ORTHOPAEDIC SURGERY

## 2025-04-28 NOTE — PROGRESS NOTES
Name: Samantha Rodriguez      : 1965       MRN: [id number]   Encounter Provider: Bayron Bills MD   Encounter Date: 25  Encounter department: [facility] ORTHOPEDIC SPINE SURGERY    Medical Decision Making:     Assessment & Plan  S/P lumbar fusion    The clinical, physical and imaging findings were reviewed with the patient  Discussed the etiology and pathomechanics of lumbar disc degeneration, revision lumbar fusion surgery  Discussed treatment options   Non operative treatment options include physical therapy, at home exercises, activity modifications, chiropractic medicine, acupuncture, oral medications, and interventional spine procedures  After discussion with the patient we agreed upon continued conservative treatments  Recommend continued physical therapy/HEP to work on core strengthening, lumbar ROM, strengthening, and stretching exercises.  Samantha notes that she has been undergoing daily rehab while at her nursing facility.  Overall Samantha notes significant improvements from preoperative; she is now able to stand upright and ambulate and has had no significant radicular symptoms  Of note patient does have significant tenderness along the distal aspect of her IT band in the setting of a previous right total knee arthroplasty, recommend follow-up with joint surgery and ongoing physical therapy, topical anti-inflammatories  Ice/heat, OTC pain medication as needed.  Follow-up:  Return in about 3 months for follow up after further conservative treatments.       Fusion of lumbar spine           Subjective:      Chief Complaint: Back Pain    HPI:  Samantha Rodriguez is a 59 y.o. female presenting for follow-up evaluation of her lumbar spine.  She is 3 months 18 days status post I&D of the lumbar spine and 4 months status post removal of hardware L2-S1 posterior fusion L5-S1 with revision instrumentation L2 to pelvis and bone grafting of disc at  L5-S1 performed with Dr. Stevens.  Date of surgery 12/19/2024.  She states that she is doing better overall but is still having difficulty with ambulation secondary to right knee pain.  She localizes pain to the anterior aspect of her thigh with extension into her knee.  She does have a history of a total knee arthroplasty performed in 2022.  She does work with the rehab center but becoming more ambulatory every day.  She does typically utilize a walker.  She states that positional changes from sitting to standing can be difficult.  She denies any significant back pain but does experience intermittent spasms on the left side.  She denies any recent fevers or chills.  She continues with antibiotic treatment.    Objective:     Family History   Problem Relation Age of Onset    Colon cancer Mother     Alzheimer's disease Father     Stroke Father     No Known Problems Sister     Colon cancer Brother     Bipolar disorder Brother     No Known Problems Brother     Depression Paternal Grandfather     Breast cancer Maternal Aunt     Colon cancer Maternal Aunt     Seizures Son     Heart disease Other     Diabetes Other     Hypertension Other     Thyroid disease Neg Hx        Past Medical History:   Diagnosis Date    Anxiety     Arthritis     left knee    Arthritis of right knee 10/06/2020    At risk for falls     Bipolar 2 disorder (HCC)     FOLLOWS WITH PSYCHIATRIST. CONTINUE LAMOTRIGINE; RESOLVED: [id number]    Depression     Dysphagia     per [facility] paperwork    Familial tremor     both hands    Fibromyalgia     LAST ASSESSED: [id number]    GERD (gastroesophageal reflux disease)     Hearing aid worn     left ear    [place] (hard of hearing)     left ear    Hyperlipidemia     Hypertension     Impaired speech     Left-sided weakness     Lumbar disc disease with radiculopathy 02/02/2018    Memory loss of unknown cause     long and short term    Migraine     Multiple closed fractures of metatarsal bone of right foot 05/02/2021     Obesity     Obesity, Class II, BMI 35-39.9     Osteoarthritis of both hips 10/31/2016    Osteoarthritis of knee 02/20/2013    Description: Continue Tylenol and Naproxen. Encourage exercise and weight loss. Patient refused physical therapy. I will refer the patient back to Orthopedics.    Overactive bladder     Panic attack     Patellofemoral disorder of both knees 05/01/2020    Post traumatic stress disorder     Primary localized osteoarthritis of both knees 06/16/2017    Primary osteoarthritis of both knees 05/01/2020    S/P insertion of spinal cord stimulator     no remote    S/P total knee arthroplasty, right 03/10/2022    Sacroiliitis (HCC) 02/28/2017    Seasonal allergies     Seizure-like activity (HCC) 06/03/2022    Seizures (HCC)     possible seizure like activity    Small bowel obstruction ([facility]) 03/24/2023    Status post total knee replacement, left 07/05/2022    Stroke ([facility])     questionable stroke 2009    Tardive dyskinesia     PATIENT STATES    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  CONT ASA 81 MG DAILY; RESOLVED: [id number]    Urinary incontinence     Uses walker     Wears dentures     partial lower / full upper- doesnt wear    Wears glasses        Current Outpatient Medications   Medication Sig Dispense Refill    acetaminophen (TYLENOL) 325 mg tablet Take 2 tablets (650 mg total) by mouth every 4 (four) hours as needed for moderate pain      amLODIPine (NORVASC) 5 mg tablet Take 1 tablet (5 mg total) by mouth daily 30 tablet 0    amoxicillin (AMOXIL) 500 mg capsule Take 500 mg by mouth every 12 (twelve) hours      ARIPiprazole (ABILIFY) 10 mg tablet Take 1 tablet (10 mg total) by mouth daily 30 tablet 0    aspirin (Aspirin Low Dose) 81 mg EC tablet Take 1 tablet (81 mg total) by mouth daily 30 tablet 0    atorvastatin (LIPITOR) 40 mg tablet Take 1 tablet (40 mg total) by mouth daily with dinner 30 tablet 0    benztropine (COGENTIN) 1 mg tablet Take 1 tablet (1 mg total) by  mouth 2 (two) times a day 60 tablet 0    bisacodyl (FLEET) 10 MG/30ML ENEM Insert 10 mg into the rectum once      busPIRone (BUSPAR) 15 mg tablet Take 1 tablet (15 mg total) by mouth 3 (three) times a day 90 tablet 0    Cholecalciferol (VITAMIN D3) 1,000 units tablet Take 1 tablet (1,000 Units total) by mouth daily 30 tablet 0    Diclofenac Sodium (VOLTAREN) 1 % Apply 2 g topically 4 (four) times a day as needed (pain) 150 g 3    docusate sodium (COLACE) 100 mg capsule Take 100 mg by mouth 2 (two) times a day      Emollient (cetaphil) cream Apply topically as needed for dry skin      guaiFENesin (ROBITUSSIN) 100 MG/5ML oral liquid Take 200 mg by mouth 3 (three) times a day as needed for cough      hydrOXYzine HCL (ATARAX) 25 mg tablet Take 1 tablet (25 mg total) by mouth 3 (three) times a day 90 tablet 0    lidocaine (LIDODERM) 5 % Apply 1 patch topically over 12 hours daily Remove & Discard patch within 12 hours or as directed by MD 10 patch 0    loratadine (CLARITIN) 10 mg tablet Take 1 tablet (10 mg total) by mouth daily 30 tablet 0    lubiprostone (AMITIZA) 8 mcg capsule Take 1 capsule (8 mcg total) by mouth 2 (two) times a day 60 capsule 0    magnesium Oxide (MAG-OX) 400 mg TABS Take 1 tablet (400 mg total) by mouth daily 30 tablet 0    methocarbamol (ROBAXIN) 500 mg tablet Take 1 tablet (500 mg total) by mouth every 6 (six) hours 30 tablet 0    Nutritional Supplements (Anthony) PACK Take by mouth      ondansetron (ZOFRAN) 4 mg tablet Take 4 mg by mouth every 6 (six) hours as needed for nausea or vomiting      oxyCODONE (OXY-IR) 5 MG capsule Take 5 mg by mouth every 4 (four) hours as needed for moderate pain      pantoprazole (PROTONIX) 40 mg tablet Take 1 tablet (40 mg total) by mouth daily in the early morning 30 tablet 0    Polyethylene Glycol 3350 POWD Give 17 gram by mouth as needed for constipation. Mix with 4-8 oz suitable liquids. May give daily.      prazosin (MINIPRESS) 2 mg capsule Take 1 capsule (2 mg  total) by mouth daily at bedtime 30 capsule 0    pregabalin (LYRICA) 150 mg capsule Take 1 capsule (150 mg total) by mouth 3 (three) times a day 90 capsule 0    sertraline (ZOLOFT) 100 mg tablet Take 2 tablets (200 mg total) by mouth daily at bedtime 60 tablet 0    simethicone (MYLICON) 80 mg chewable tablet Chew 80 mg every 6 (six) hours as needed for flatulence      sodium chloride Inject into a catheter in a vein continuous Use 10 cc intravenously every shift for unused lumen      sodium chloride Inject into a catheter in a vein continuous Use 5 cc intravenously every day shift for before and after med administration.      tirzepatide (Zepbound) 2.5 mg/0.5 mL auto-injector Inject 2.5 mg under the skin Once a week      traZODone (DESYREL) 300 MG tablet Take 1 tablet (300 mg total) by mouth daily at bedtime 30 tablet 0    tuberculin 5 units/0.1 mL Inject 5 Units into the skin once (Patient not taking: Reported on 2025)      Valbenazine Tosylate (Ingrezza) 60 MG CAPS Take 1 capsule by mouth in the morning 30 capsule 0     No current facility-administered medications for this visit.       Past Surgical History:   Procedure Laterality Date    BACK SURGERY       SECTION      COLONOSCOPY      RESOLVED: 2016    EAR SURGERY      EGD      HYSTERECTOMY      INCISION AND DRAINAGE POSTERIOR SPINE Bilateral 1/10/2025    Procedure: Incision and Drainage Lumbar Spine;  Surgeon: Bayron Bills MD;  Location: [facility];  Service: Orthopedics    LAPAROTOMY N/A 2025    Procedure: LAPAROTOMY EXPLORATORY, lysis of adhesions;  Surgeon: Chandni Byrne DO;  Location: [facility];  Service: General    LUMBAR FUSION N/A 2024    Procedure: Removal hardware L2-S1, posterior fusion L5-S1, navigated revision instrumentation L2 to pelvis; application of bilateral S2 alar screws and bone grafting of the disc at L5-S1;  Surgeon: Yenni Stevens MD;  Location: [facility];  Service: Orthopedics    MYRINGOTOMY  W/ TUBES Left     NECK SURGERY  04/2019    MT ARTHRP KNE CONDYLE&PLATU MEDIAL&LAT COMPARTMENTS Right 03/09/2022    Procedure: ARTHROPLASTY KNEE TOTAL;  Surgeon: Chandni Tuttle DO;  [facility];  Service: Orthopedics    MT ARTHRP KNE CONDYLE&PLATU MEDIAL&LAT COMPARTMENTS Left 07/05/2022    Procedure: ARTHROPLASTY KNEE TOTAL;  Surgeon: Chandni Tuttle DO;  [facility];  Service: Orthopedics    MT CYSTOURETHROSCOPY N/A 02/18/2016    Procedure: CYSTOSCOPY, BOTOX INJECTION;  Surgeon: Abraham Teague MD;  Location: [facility];  Service: Gynecology    MT INSJ/RPLCMT SPINAL NPG/RCVR POCKET CRTJ&CONNJ Right 02/10/2021    Procedure: REPLACEMENT IMPLANTABLE PULSE GENERATOR DORSAL SPINAL COLUMN STIMULATOR, RIGHT;  Surgeon: Carlos Alberto Jacome MD;  Location[facility];  Service: Neurosurgery    MT PRQ IMPLTJ NSTIM ELECTRODE ARRAY EPIDURAL Right 07/28/2020    Procedure: INSERTION THORACIC DORSAL COLUMN SPINAL CORD STIMULATOR PERCUTANEOUS W IMPLANTABLE PULSE GENERATOR, RIGHT;  Surgeon: Carlos Alberto Jacome MD;  Location: [facility];  Service: Neurosurgery    TONSILLECTOMY      TUBAL LIGATION  1986    UPPER GASTROINTESTINAL ENDOSCOPY  09/2020       Social History     Socioeconomic History    Marital status:      Spouse name: Not on file    Number of children: 2    Years of education: graduate school     Highest education level: Not on file   Occupational History    Occupation: Disabled   Tobacco Use    Smoking status: Never    Smokeless tobacco: Never   Vaping Use    Vaping status: Never Used   Substance and Sexual Activity    Alcohol use: Not Currently     Comment: 2 x year; being a social drinker as per all scripts     Drug use: No    Sexual activity: Not Currently   Other Topics Concern    Not on file   Social History Narrative    Bereavement    Daily caffeine consumption, 6-8 servings per day     as per all scripts    Lives in [place]      Social Drivers of Health     Financial  Resource Strain: Low Risk  (2024)    Overall Financial Resource Strain (CARDIA)     Difficulty of Paying Living Expenses: Not very hard   Food Insecurity: No Food Insecurity (1/10/2025)    Nursing - Inadequate Food Risk Classification     Worried About Running Out of Food in the Last Year: Never true     Ran Out of Food in the Last Year: Never true     Ran Out of Food in the Last Year: Never true   Transportation Needs: No Transportation Needs (1/10/2025)    Nursing - Transportation Risk Classification     Lack of Transportation: Not on file     Lack of Transportation: No   Physical Activity: Insufficiently Active (2024)    Exercise Vital Sign     Days of Exercise per Week: 5 days     Minutes of Exercise per Session: 20 min   Stress: No Stress Concern Present (2024)    [employer] [place] [employer] - Occupational Stress Questionnaire     Feeling of Stress : Not at all   Social Connections: Unknown (2024)    Received from [employer]     How often do you feel lonely or isolated from those around you? (Adult - for ages 18 years and over): Not on file   Intimate Partner Violence: Unknown (1/10/2025)    Nursing IPS     Feels Physically and Emotionally Safe: Not on file     Physically Hurt by Someone: Not on file     Humiliated or Emotionally Abused by Someone: Not on file     Physically Hurt by Someone: No     Hurt or Threatened by Someone: No   Housing Stability: Unknown (1/10/2025)    Nursing: Inadequate Housing Risk Classification     Has Housing: Not on file     Worried About Losing Housing: Not on file     Unable to Get Utilities: Not on file     Unable to Pay for Housing in the Last Year: No     Has Housin       No Known Allergies    Review of Systems  General- denies fever/chills  HEENT- denies hearing loss or sore throat  Eyes- denies eye pain or visual disturbances, denies red eyes  Respiratory- denies cough or SOB  Cardio- denies chest pain or  "palpitations  GI- denies abdominal pain  Endocrine- denies urinary frequency  Urinary- denies pain with urination  Musculoskeletal- Negative except noted above  Skin- denies rashes or wounds  Neurological- denies dizziness or headache  Psychiatric- denies anxiety or difficulty concentrating    Physical Exam  Ht 5' 3\" (1.6 m)   Wt 91.2 kg (201 lb 1 oz)   BMI 35.62 kg/m²     General/Constitutional: No apparent distress: well-nourished and well developed.  Lymphatic: No appreciable lymphadenopathy  Respiratory: Non-labored breathing  Vascular: No edema, swelling or tenderness, except as noted in detailed exam.  Integumentary: No impressive skin lesions present, except as noted in detailed exam.  Psych: Normal mood and affect, oriented to person, place and time.  MSK: normal other than stated in HPI and exam  Gait & balance: no evidence of myelopathic gait, ambulates with a walker however presents today in a wheelchair but is able to stand up independently in the clinic and ambulate    Lumbar spine range of motion:  -Extension to neutral  There is mild tenderness with palpation along lumbar paraspinal musculature, no midline tenderness , surgical site is now well-healed    Neurologic: Intact    Lower Extremity Motor Function    Right  Left    Iliopsoas  4/5  4/5    Quadriceps 4/5 4/5   Tibialis anterior  4/5  4/5    EHL  4/5  4/5    Gastroc. muscle  4/5  4/5      Sensory: light touch is intact to bilateral lower extremities     Reflexes:  Intact, diminished       Electronic Medical Records were reviewed including prior office notes    Procedures, if performed today     None performed       Portions of the record may have been created with voice recognition software.  Occasional wrong word or \"sound a like\" substitutions may have occurred due to the inherent limitations of voice recognition software.  Read the chart carefully and recognize, using context, where substitutions have occurred.    "

## 2025-04-28 NOTE — ASSESSMENT & PLAN NOTE
The clinical, physical and imaging findings were reviewed with the patient  Discussed the etiology and pathomechanics of lumbar disc degeneration, revision lumbar fusion surgery  Discussed treatment options   Non operative treatment options include physical therapy, at home exercises, activity modifications, chiropractic medicine, acupuncture, oral medications, and interventional spine procedures  After discussion with the patient we agreed upon continued conservative treatments  Recommend continued physical therapy/HEP to work on core strengthening, lumbar ROM, strengthening, and stretching exercises.  Samantha notes that she has been undergoing daily rehab while at her nursing facility.  Overall Samantha notes significant improvements from preoperative; she is now able to stand upright and ambulate and has had no significant radicular symptoms  Of note patient does have significant tenderness along the distal aspect of her IT band in the setting of a previous right total knee arthroplasty, recommend follow-up with joint surgery and ongoing physical therapy, topical anti-inflammatories  Ice/heat, OTC pain medication as needed.  Follow-up:  Return in about 3 months for follow up after further conservative treatments.

## (undated) PROCEDURE — 0JB70ZZ EXCISION OF BACK SUBCUTANEOUS TISSUE AND FASCIA, OPEN APPROACH: ICD-10-PCS | Performed by: FAMILY MEDICINE

## (undated) DEVICE — BETHLEHEM UNIVERSAL MINOR GEN: Brand: CARDINAL HEALTH

## (undated) DEVICE — CURVED, LARGE JAW, OPEN SEALER/DIVIDER NANO-COATED: Brand: LIGASURE IMPACT

## (undated) DEVICE — NEEDLE 18 G X 1 1/2

## (undated) DEVICE — COBAN 4 IN STERILE

## (undated) DEVICE — DRESSING MEPILEX AG BORDER POST-OP 4 X 12 IN

## (undated) DEVICE — 3M™ STERI-STRIP™ REINFORCED ADHESIVE SKIN CLOSURES, R1547, 1/2 IN X 4 IN (12 MM X 100 MM), 6 STRIPS/ENVELOPE: Brand: 3M™ STERI-STRIP™

## (undated) DEVICE — CULTURE TUBE AEROBIC

## (undated) DEVICE — DRESSING MEPILEX AG BORDER 4 X 4 IN

## (undated) DEVICE — NEEDLE 22 G X 1 1/2 SAFETY

## (undated) DEVICE — HOOD: Brand: FLYTE, SURGICOOL

## (undated) DEVICE — TRAY FOLEY 16FR URIMETER SURESTEP

## (undated) DEVICE — PLUMEPEN PRO 10FT

## (undated) DEVICE — SUT MONOCRYL 4-0 PS-2 27 IN Y426H

## (undated) DEVICE — U-DRAPE: Brand: CONVERTORS

## (undated) DEVICE — 450 ML BOTTLE OF 0.05% CHLORHEXIDINE GLUCONATE IN 99.95% STERILE WATER FOR IRRIGATION, USP AND APPLICATOR.: Brand: IRRISEPT ANTIMICROBIAL WOUND LAVAGE

## (undated) DEVICE — GLOVE INDICATOR PI UNDERGLOVE SZ 7.5 BLUE

## (undated) DEVICE — SPONGE SCRUB 4 PCT CHLORHEXIDINE

## (undated) DEVICE — BETHLEHEM UNIV TOTAL KNEE, KIT: Brand: CARDINAL HEALTH

## (undated) DEVICE — INTENDED FOR TISSUE SEPARATION, AND OTHER PROCEDURES THAT REQUIRE A SHARP SURGICAL BLADE TO PUNCTURE OR CUT.: Brand: BARD-PARKER ® CARBON RIB-BACK BLADES

## (undated) DEVICE — UTILITY MARKER,BLACK WITH LABELS: Brand: DEVON

## (undated) DEVICE — EXOFIN PRECISION PEN HIGH VISCOSITY TOPICAL SKIN ADHESIVE: Brand: EXOFIN PRECISION PEN, 1G

## (undated) DEVICE — Device

## (undated) DEVICE — SET SCREW 5540030 5.5 TI NS BRK OFF
Type: IMPLANTABLE DEVICE | Site: SPINE LUMBAR | Status: NON-FUNCTIONAL
Brand: CD HORIZON® SPINAL SYSTEM

## (undated) DEVICE — GLOVE SRG BIOGEL 6

## (undated) DEVICE — SUT MONOCRYL PLUS 4-0 PS-2 18 IN MCP496G

## (undated) DEVICE — ADHESIVE SKIN HIGH VISCOSITY EXOFIN 1ML

## (undated) DEVICE — ATTUNE KNEE SYSTEM TIBIAL INSERT ROTATING PLATFORM CRUCIATE RETAINING SIZE 3 5MM AOX
Type: IMPLANTABLE DEVICE | Site: KNEE | Status: NON-FUNCTIONAL
Brand: ATTUNE

## (undated) DEVICE — CEMENT MIXING CARTRIDGE PRISM II

## (undated) DEVICE — DRESSING MEPILEX FOAM BORDER SACRUM 6.3 X 7.9IN

## (undated) DEVICE — SKIN MARKER DUAL TIP WITH RULER CAP, FLEXIBLE RULER AND LABELS: Brand: DEVON

## (undated) DEVICE — PENCIL ELECTROSURG E-Z CLEAN -0035H

## (undated) DEVICE — DRAPE SHEET X-LG

## (undated) DEVICE — COBAN 6 IN STERILE

## (undated) DEVICE — PAD GROUNDING DUAL ADULT

## (undated) DEVICE — TUBING SUCTION 5MM X 12 FT

## (undated) DEVICE — FRAZIER SUCTION INSTRUMENT 18 FR W/OBTURATOR, NO CONTROL VENT: Brand: FRAZIER

## (undated) DEVICE — SCD SEQUENTIAL COMPRESSION COMFORT SLEEVE MEDIUM KNEE LENGTH: Brand: KENDALL SCD

## (undated) DEVICE — SUT VICRYL 2-0 CT-2 27 IN J269H

## (undated) DEVICE — GLOVE SRG BIOGEL ORTHOPEDIC 6.5

## (undated) DEVICE — ANTIBACTERIAL UNDYED BRAIDED (POLYGLACTIN 910), SYNTHETIC ABSORBABLE SUTURE: Brand: COATED VICRYL

## (undated) DEVICE — SPONGE LAP 18 X 18 IN STRL RFD

## (undated) DEVICE — SIGMOIDOSCOPIC SUCTION INSTRUMENT 18 FR W/WINGED CAP CONTROL AND 6 FOOT (1.8M) TUBING: Brand: SIGMOIDOSCOPIC

## (undated) DEVICE — SUT VICRYL 2-0 CT-1 36 IN J945H

## (undated) DEVICE — CUFF TOURNIQUET 30 X 4 IN QUICK CONNECT DISP 1BLA

## (undated) DEVICE — MASTISOL LIQ ADHESIVE 2/3ML

## (undated) DEVICE — LIGHT HANDLE COVER SLEEVE DISP BLUE STELLAR

## (undated) DEVICE — DRESSING MEPILEX FOAM BORDER FLEX 4 X 4IN

## (undated) DEVICE — 3M™ IOBAN™ 2 ANTIMICROBIAL INCISE DRAPE 6640EZ: Brand: IOBAN™ 2

## (undated) DEVICE — DISPOSABLE EQUIPMENT COVER: Brand: SMALL TOWEL DRAPE

## (undated) DEVICE — BETHLEHEM UNIVERSAL SPINE, KIT: Brand: CARDINAL HEALTH

## (undated) DEVICE — TIBURON SPLIT SHEET: Brand: CONVERTORS

## (undated) DEVICE — SURGI KIT INSTRUMENT ORGANIZER

## (undated) DEVICE — SYRINGE EPI 8ML LUER SLIP LOSS OF RESISTANCE PLASTIC PERFIX

## (undated) DEVICE — BIPOLAR SEALER 23-113-1 AQM 2.3: Brand: AQUAMANTYS™

## (undated) DEVICE — JP 3-SPRING RES W/10FR PVC DRAIN/TR: Brand: CARDINAL HEALTH

## (undated) DEVICE — 10FR FRAZIER SUCTION HANDLE: Brand: CARDINAL HEALTH

## (undated) DEVICE — CULTURE TUBE ANAEROBIC

## (undated) DEVICE — ELECTRODE BLADE MOD E-Z CLEAN 2.5IN 6.4CM -0012M

## (undated) DEVICE — NEEDLE HYPO 22G X 1-1/2 IN

## (undated) DEVICE — WEBRIL 6 IN UNSTERILE

## (undated) DEVICE — SUT VICRYL 1 CTX 36 IN J977H

## (undated) DEVICE — SPECIMEN CONTAINER STERILE PEEL PACK

## (undated) DEVICE — GLOVE SRG BIOGEL PI ORTHOPEDIC 7

## (undated) DEVICE — ANTIBACTERIAL VIOLET BRAIDED (POLYGLACTIN 910), SYNTHETIC ABSORBABLE SUTURE: Brand: COATED VICRYL

## (undated) DEVICE — TOOL 14MH30 LEGEND 14CM 3MM: Brand: MIDAS REX ™

## (undated) DEVICE — MEDI-VAC YANK SUCT HNDL W/TPRD BULBOUS TIP: Brand: CARDINAL HEALTH

## (undated) DEVICE — SYRINGE 30ML LL

## (undated) DEVICE — IMPERVIOUS STOCKINETTE: Brand: DEROYAL

## (undated) DEVICE — MONITORING SPINAL IMPULSE CASE FEE

## (undated) DEVICE — 3M™ IOBAN™ 2 ANTIMICROBIAL INCISE DRAPE 6650EZ: Brand: IOBAN™ 2

## (undated) DEVICE — GLOVE INDICATOR PI UNDERGLOVE SZ 6.5 BLUE

## (undated) DEVICE — PROGRAMMER EXTRNL WIRELESS NEURO STIM RS2

## (undated) DEVICE — GLOVE SRG BIOGEL ECLIPSE 7

## (undated) DEVICE — SUT SILK 2-0 SH 30 IN K833H

## (undated) DEVICE — OCCLUSIVE GAUZE STRIP,3% BISMUTH TRIBROMOPHENATE IN PETROLATUM BLEND: Brand: XEROFORM

## (undated) DEVICE — GLOVE SRG BIOGEL 6.5

## (undated) DEVICE — SUT SILK 2-0 TIES 144 IN LA55G

## (undated) DEVICE — CAPIT KNEE ATTUNE RP CEMENT - DEPUY

## (undated) DEVICE — SUPPLY FEE STD

## (undated) DEVICE — 3M™ IOBAN™ 2 ANTIMICROBIAL INCISE DRAPE 6648EZ: Brand: IOBAN™ 2

## (undated) DEVICE — GAUZE SPONGES,16 PLY: Brand: CURITY

## (undated) DEVICE — BULB SYRINGE,IRRIGATION WITH PROTECTIVE CAP: Brand: DOVER

## (undated) DEVICE — PREP SURGICAL PURPREP 26ML

## (undated) DEVICE — 4-PORT MANIFOLD: Brand: NEPTUNE 2

## (undated) DEVICE — ABDOMINAL PAD: Brand: DERMACEA

## (undated) DEVICE — INTENDED FOR TISSUE SEPARATION, AND OTHER PROCEDURES THAT REQUIRE A SHARP SURGICAL BLADE TO PUNCTURE OR CUT.: Brand: BARD-PARKER SAFETY BLADES SIZE 10, STERILE

## (undated) DEVICE — DRAPE SHEET THREE QUARTER

## (undated) DEVICE — 3M™ STERI-DRAPE™ U-DRAPE 1015: Brand: STERI-DRAPE™

## (undated) DEVICE — CHLORAPREP HI-LITE 26ML ORANGE

## (undated) DEVICE — 3000CC GUARDIAN II: Brand: GUARDIAN

## (undated) DEVICE — PADDING CAST 6IN COTTON STRL

## (undated) DEVICE — PAD GROUNDING ADULT

## (undated) DEVICE — SURGICAL GOWN, XL SMARTSLEEVE: Brand: CONVERTORS

## (undated) DEVICE — DRAPE C-ARMOUR

## (undated) DEVICE — PROGRAMMER PATIENT PROCLAIM

## (undated) DEVICE — GLOVE INDICATOR PI UNDERGLOVE SZ 8 BLUE

## (undated) DEVICE — GLOVE SRG BIOGEL 8

## (undated) DEVICE — BLADE OSCILLATING SAW 1/2

## (undated) DEVICE — GLOVE SRG BIOGEL 7.5

## (undated) DEVICE — VIAL DECANTER

## (undated) DEVICE — DRAPE C-ARM X-RAY

## (undated) DEVICE — ELECTRODE BLADE MOD E-Z CLEAN 4IN -0014AM

## (undated) DEVICE — THE SIMPULSE SOLO SYSTEM WITH ULTREX RETRACTABLE SPLASH SHIELD TIP: Brand: SIMPULSE SOLO

## (undated) DEVICE — BETHLEHEM MAJOR GENERAL PACK: Brand: CARDINAL HEALTH

## (undated) DEVICE — INTENDED FOR TISSUE SEPARATION, AND OTHER PROCEDURES THAT REQUIRE A SHARP SURGICAL BLADE TO PUNCTURE OR CUT.: Brand: BARD-PARKER SAFETY BLADES SIZE 15, STERILE

## (undated) DEVICE — GLOVE INDICATOR UNDERGLOVE SZ 6 BLUE

## (undated) DEVICE — DRESSING MEPILEX AG BORDER POST-OP 4 X 10 IN

## (undated) DEVICE — BLADE OSCILLATOR 86.0 X 19.5MM

## (undated) DEVICE — DRESSING MEPILEX AG BORDER POST-OP 4 X 8 IN

## (undated) DEVICE — TOOL MR8-9MC30 MR8 9CM MTL CUT 3MM C: Brand: MIDAS REX MR8

## (undated) DEVICE — MEDI-VAC YANKAUER SUCTION HANDLE W/STRAIGHT TIP & CONTROL VENT: Brand: CARDINAL HEALTH

## (undated) DEVICE — COOL TEMP PAD

## (undated) DEVICE — SUT VICRYL 0 REEL 54 IN J287G

## (undated) DEVICE — SURGIFOAM 8.5 X 12.5

## (undated) DEVICE — ACE WRAP 6 IN UNSTERILE

## (undated) DEVICE — SUT SILK 3-0 SH CR/8 18 IN C013D

## (undated) DEVICE — HEMOSTATIC MATRIX SURGIFLO 8ML W/THROMBIN

## (undated) DEVICE — LAPAROTOMY DRAPE WITH POUCHES: Brand: CONVERTORS

## (undated) DEVICE — SNAP KOVER: Brand: UNBRANDED

## (undated) DEVICE — RX COUDÉ® EPIDURAL NEEDLE 14G TW X 4.0": Brand: EPIMED

## (undated) DEVICE — SUT VICRYL 1 CT-1 27 IN J261H

## (undated) DEVICE — RECHARGER PRGRMR THERAPHY MANAGER RS2

## (undated) DEVICE — JACKSON TABLE FOAM POSITIONING KIT: Brand: CARDINAL HEALTH

## (undated) DEVICE — MARKER REFLECTIVE RADIOPAQUE SPHERE

## (undated) DEVICE — MINOR PROCEDURE DRAPE: Brand: CONVERTORS

## (undated) DEVICE — NEEDLE 23G X 1 1/2 SAFETY-GLIDE THIN WALL

## (undated) DEVICE — GLOVE SRG BIOGEL 7